# Patient Record
Sex: MALE | Race: WHITE | NOT HISPANIC OR LATINO | URBAN - METROPOLITAN AREA
[De-identification: names, ages, dates, MRNs, and addresses within clinical notes are randomized per-mention and may not be internally consistent; named-entity substitution may affect disease eponyms.]

---

## 2017-03-20 ENCOUNTER — INPATIENT (INPATIENT)
Facility: HOSPITAL | Age: 82
LOS: 15 days | Discharge: NURSING, REHAB OR LONG-TERM CARE | End: 2017-04-05
Attending: INTERNAL MEDICINE | Admitting: FAMILY MEDICINE
Payer: MEDICARE

## 2017-03-20 VITALS — WEIGHT: 216.05 LBS | HEIGHT: 64 IN

## 2017-03-20 DIAGNOSIS — Z95.9 PRESENCE OF CARDIAC AND VASCULAR IMPLANT AND GRAFT, UNSPECIFIED: Chronic | ICD-10-CM

## 2017-03-20 RX ORDER — HYDROMORPHONE HYDROCHLORIDE 2 MG/ML
0.5 INJECTION INTRAMUSCULAR; INTRAVENOUS; SUBCUTANEOUS ONCE
Qty: 0 | Refills: 0 | Status: DISCONTINUED | OUTPATIENT
Start: 2017-03-20 | End: 2017-03-20

## 2017-03-20 RX ORDER — ONDANSETRON 8 MG/1
4 TABLET, FILM COATED ORAL ONCE
Qty: 0 | Refills: 0 | Status: COMPLETED | OUTPATIENT
Start: 2017-03-20 | End: 2017-03-20

## 2017-03-20 RX ADMIN — ONDANSETRON 4 MILLIGRAM(S): 8 TABLET, FILM COATED ORAL at 23:37

## 2017-03-20 RX ADMIN — HYDROMORPHONE HYDROCHLORIDE 0.5 MILLIGRAM(S): 2 INJECTION INTRAMUSCULAR; INTRAVENOUS; SUBCUTANEOUS at 23:37

## 2017-03-20 NOTE — ED PROVIDER NOTE - PMH
Atelectasis    Benign prostatic hypertrophy    CAD (coronary artery disease)    CRI (chronic renal insufficiency)    Dyspepsia  On moderate exertion.  GERD (gastroesophageal reflux disease)    Gout    Hypercholesterolemia    Hypertension    Peripheral edema    Pleural effusion, bilateral    Respiratory failure    Sepsis, due to unspecified organism  2/2 poorly healing wounds b/l  Sleep apnea, obstructive  Requires home 02 therapy, and treatment with BIPAP  Spinal stenosis

## 2017-03-20 NOTE — ED PROVIDER NOTE - MUSCULOSKELETAL, MLM
Right patellar reflex greater than left. Left leg has erythema and oozing of the skin from the knee to the toes. Right leg has a 5cm area of oozing with about 2.5cm ulcer on the rigth lateral malleolus with black eschar. B/L pulses. Good motor strength on both lower extremities.

## 2017-03-20 NOTE — ED PROVIDER NOTE - OBJECTIVE STATEMENT
Pt c/o bilat buttock pain down to right lower leg and foot for months.  Today worse.  Pt awoke could not walkPt was advised to see pain management doctor.  Pt jerking with the pain.Weakness in right leg.  No fever.  Pt uses unna boots. Pmh HTn,copd,chf, Pt has seen Dr. Voss and Dr. Maxwell for epidurals.  Hx of spinal stenosis. Using gapapentin and lidocaine patches.Hx mi, cardiac stent.  Primary doc is Sheila Hannah cardiologist, Irma Tomas, hx gi bleed Gabbazideh. Hourizedeh.  No allergies to meds. No incontinence.Also c/o rash uppper extremeties. Ex smoker 50 pack year hisstory.No alcohol no drugs.Last mri in hospital.  hx sepsis from venous stsis ulcer this fall

## 2017-03-20 NOTE — ED PROVIDER NOTE - PROGRESS NOTE DETAILS
Pt becoming agitated and confused. Noted to have increased tachycardia, O2 pulse ox approx 90%. DuoNeb ordered. Pt refusing to use O2 mask or do neb treatments. States he does not want to have it done. Explained the risks. Pt appears confused.

## 2017-03-20 NOTE — ED PROVIDER NOTE - NS ED MD SCRIBE ATTENDING SCRIBE SECTIONS
PAST MEDICAL/SURGICAL/SOCIAL HISTORY/PHYSICAL EXAM/DISPOSITION/VITAL SIGNS( Pullset)/REVIEW OF SYSTEMS/RESULTS/PROGRESS NOTE/HISTORY OF PRESENT ILLNESS

## 2017-03-21 DIAGNOSIS — M54.9 DORSALGIA, UNSPECIFIED: ICD-10-CM

## 2017-03-21 DIAGNOSIS — I10 ESSENTIAL (PRIMARY) HYPERTENSION: ICD-10-CM

## 2017-03-21 DIAGNOSIS — I25.10 ATHEROSCLEROTIC HEART DISEASE OF NATIVE CORONARY ARTERY WITHOUT ANGINA PECTORIS: ICD-10-CM

## 2017-03-21 DIAGNOSIS — R94.31 ABNORMAL ELECTROCARDIOGRAM [ECG] [EKG]: ICD-10-CM

## 2017-03-21 DIAGNOSIS — I48.0 PAROXYSMAL ATRIAL FIBRILLATION: ICD-10-CM

## 2017-03-21 DIAGNOSIS — N17.9 ACUTE KIDNEY FAILURE, UNSPECIFIED: ICD-10-CM

## 2017-03-21 DIAGNOSIS — D50.0 IRON DEFICIENCY ANEMIA SECONDARY TO BLOOD LOSS (CHRONIC): ICD-10-CM

## 2017-03-21 DIAGNOSIS — I50.32 CHRONIC DIASTOLIC (CONGESTIVE) HEART FAILURE: ICD-10-CM

## 2017-03-21 DIAGNOSIS — R10.13 EPIGASTRIC PAIN: ICD-10-CM

## 2017-03-21 LAB
ALBUMIN SERPL ELPH-MCNC: 2.4 G/DL — LOW (ref 3.3–5)
ALBUMIN SERPL ELPH-MCNC: 2.5 G/DL — LOW (ref 3.3–5)
ALP SERPL-CCNC: 34 U/L — LOW (ref 40–120)
ALP SERPL-CCNC: 35 U/L — LOW (ref 40–120)
ALT FLD-CCNC: 11 U/L — LOW (ref 12–78)
ALT FLD-CCNC: 14 U/L — SIGNIFICANT CHANGE UP (ref 12–78)
ANION GAP SERPL CALC-SCNC: 10 MMOL/L — SIGNIFICANT CHANGE UP (ref 5–17)
ANION GAP SERPL CALC-SCNC: 12 MMOL/L — SIGNIFICANT CHANGE UP (ref 5–17)
ANISOCYTOSIS BLD QL: SLIGHT — SIGNIFICANT CHANGE UP
APTT BLD: 33.6 SEC — SIGNIFICANT CHANGE UP (ref 27.5–37.4)
APTT BLD: 35.5 SEC — SIGNIFICANT CHANGE UP (ref 27.5–37.4)
AST SERPL-CCNC: 19 U/L — SIGNIFICANT CHANGE UP (ref 15–37)
AST SERPL-CCNC: 23 U/L — SIGNIFICANT CHANGE UP (ref 15–37)
BASOPHILS # BLD AUTO: 0.1 K/UL — SIGNIFICANT CHANGE UP (ref 0–0.2)
BASOPHILS NFR BLD AUTO: 1.3 % — SIGNIFICANT CHANGE UP (ref 0–2)
BILIRUB SERPL-MCNC: 0.2 MG/DL — SIGNIFICANT CHANGE UP (ref 0.2–1.2)
BILIRUB SERPL-MCNC: 0.5 MG/DL — SIGNIFICANT CHANGE UP (ref 0.2–1.2)
BLD GP AB SCN SERPL QL: SIGNIFICANT CHANGE UP
BUN SERPL-MCNC: 108 MG/DL — HIGH (ref 7–23)
BUN SERPL-MCNC: 114 MG/DL — HIGH (ref 7–23)
CALCIUM SERPL-MCNC: 7.9 MG/DL — LOW (ref 8.5–10.1)
CALCIUM SERPL-MCNC: 8.1 MG/DL — LOW (ref 8.5–10.1)
CHLORIDE SERPL-SCNC: 112 MMOL/L — HIGH (ref 96–108)
CHLORIDE SERPL-SCNC: 114 MMOL/L — HIGH (ref 96–108)
CK SERPL-CCNC: 107 U/L — SIGNIFICANT CHANGE UP (ref 26–308)
CK SERPL-CCNC: 172 U/L — SIGNIFICANT CHANGE UP (ref 26–308)
CK SERPL-CCNC: 196 U/L — SIGNIFICANT CHANGE UP (ref 26–308)
CO2 SERPL-SCNC: 21 MMOL/L — LOW (ref 22–31)
CO2 SERPL-SCNC: 23 MMOL/L — SIGNIFICANT CHANGE UP (ref 22–31)
CREAT SERPL-MCNC: 3.23 MG/DL — HIGH (ref 0.5–1.3)
CREAT SERPL-MCNC: 3.51 MG/DL — HIGH (ref 0.5–1.3)
EOSINOPHIL # BLD AUTO: 0.2 K/UL — SIGNIFICANT CHANGE UP (ref 0–0.5)
EOSINOPHIL NFR BLD AUTO: 2.8 % — SIGNIFICANT CHANGE UP (ref 0–6)
GLUCOSE SERPL-MCNC: 124 MG/DL — HIGH (ref 70–99)
GLUCOSE SERPL-MCNC: 165 MG/DL — HIGH (ref 70–99)
HCT VFR BLD CALC: 20 % — CRITICAL LOW (ref 39–50)
HCT VFR BLD CALC: 22 % — LOW (ref 39–50)
HCT VFR BLD CALC: 22.7 % — LOW (ref 39–50)
HCT VFR BLD CALC: 23.1 % — LOW (ref 39–50)
HGB BLD-MCNC: 6.3 G/DL — CRITICAL LOW (ref 13–17)
HGB BLD-MCNC: 6.9 G/DL — CRITICAL LOW (ref 13–17)
HGB BLD-MCNC: 7.2 G/DL — LOW (ref 13–17)
HGB BLD-MCNC: 7.2 G/DL — LOW (ref 13–17)
INR BLD: 1.03 RATIO — SIGNIFICANT CHANGE UP (ref 0.88–1.16)
INR BLD: 1.08 RATIO — SIGNIFICANT CHANGE UP (ref 0.88–1.16)
LACTATE SERPL-SCNC: 0.9 MMOL/L — SIGNIFICANT CHANGE UP (ref 0.7–2)
LG PLATELETS BLD QL AUTO: SLIGHT — SIGNIFICANT CHANGE UP
LYMPHOCYTES # BLD AUTO: 0.6 K/UL — LOW (ref 1–3.3)
LYMPHOCYTES # BLD AUTO: 7 % — LOW (ref 13–44)
MACROCYTES BLD QL: SLIGHT — SIGNIFICANT CHANGE UP
MANUAL DIF COMMENT BLD-IMP: SIGNIFICANT CHANGE UP
MCHC RBC-ENTMCNC: 30.9 PG — SIGNIFICANT CHANGE UP (ref 27–34)
MCHC RBC-ENTMCNC: 31.2 GM/DL — LOW (ref 32–36)
MCHC RBC-ENTMCNC: 31.4 GM/DL — LOW (ref 32–36)
MCHC RBC-ENTMCNC: 31.5 PG — SIGNIFICANT CHANGE UP (ref 27–34)
MCV RBC AUTO: 100.5 FL — HIGH (ref 80–100)
MCV RBC AUTO: 99 FL — SIGNIFICANT CHANGE UP (ref 80–100)
MONOCYTES # BLD AUTO: 0.6 K/UL — SIGNIFICANT CHANGE UP (ref 0–0.9)
MONOCYTES NFR BLD AUTO: 6.5 % — SIGNIFICANT CHANGE UP (ref 2–14)
NEUTROPHILS # BLD AUTO: 7.1 K/UL — SIGNIFICANT CHANGE UP (ref 1.8–7.4)
NEUTROPHILS NFR BLD AUTO: 82.4 % — HIGH (ref 43–77)
NT-PROBNP SERPL-SCNC: 1025 PG/ML — HIGH (ref 0–450)
OVALOCYTES BLD QL SMEAR: SIGNIFICANT CHANGE UP
PLAT MORPH BLD: (no result)
PLATELET # BLD AUTO: 317 K/UL — SIGNIFICANT CHANGE UP (ref 150–400)
PLATELET # BLD AUTO: 337 K/UL — SIGNIFICANT CHANGE UP (ref 150–400)
POIKILOCYTOSIS BLD QL AUTO: SIGNIFICANT CHANGE UP
POLYCHROMASIA BLD QL SMEAR: SLIGHT — SIGNIFICANT CHANGE UP
POTASSIUM SERPL-MCNC: 4.2 MMOL/L — SIGNIFICANT CHANGE UP (ref 3.5–5.3)
POTASSIUM SERPL-MCNC: 4.4 MMOL/L — SIGNIFICANT CHANGE UP (ref 3.5–5.3)
POTASSIUM SERPL-SCNC: 4.2 MMOL/L — SIGNIFICANT CHANGE UP (ref 3.5–5.3)
POTASSIUM SERPL-SCNC: 4.4 MMOL/L — SIGNIFICANT CHANGE UP (ref 3.5–5.3)
PROT SERPL-MCNC: 5.7 GM/DL — LOW (ref 6–8.3)
PROT SERPL-MCNC: 5.7 GM/DL — LOW (ref 6–8.3)
PROTHROM AB SERPL-ACNC: 11.3 SEC — SIGNIFICANT CHANGE UP (ref 10–13.1)
PROTHROM AB SERPL-ACNC: 11.9 SEC — SIGNIFICANT CHANGE UP (ref 10–13.1)
RBC # BLD: 1.98 M/UL — LOW (ref 4.2–5.8)
RBC # BLD: 2.22 M/UL — LOW (ref 4.2–5.8)
RBC # FLD: 19.5 % — HIGH (ref 10.3–14.5)
RBC # FLD: 20.1 % — HIGH (ref 10.3–14.5)
RBC BLD AUTO: (no result)
SCHISTOCYTES BLD QL AUTO: SLIGHT — SIGNIFICANT CHANGE UP
SODIUM SERPL-SCNC: 145 MMOL/L — SIGNIFICANT CHANGE UP (ref 135–145)
SODIUM SERPL-SCNC: 147 MMOL/L — HIGH (ref 135–145)
TROPONIN I SERPL-MCNC: 0.02 NG/ML — SIGNIFICANT CHANGE UP (ref 0.01–0.04)
TROPONIN I SERPL-MCNC: 0.02 NG/ML — SIGNIFICANT CHANGE UP (ref 0.01–0.04)
TROPONIN I SERPL-MCNC: 0.03 NG/ML — SIGNIFICANT CHANGE UP (ref 0.01–0.04)
TYPE + AB SCN PNL BLD: SIGNIFICANT CHANGE UP
WBC # BLD: 8.6 K/UL — SIGNIFICANT CHANGE UP (ref 3.8–10.5)
WBC # BLD: 9.7 K/UL — SIGNIFICANT CHANGE UP (ref 3.8–10.5)
WBC # FLD AUTO: 8.6 K/UL — SIGNIFICANT CHANGE UP (ref 3.8–10.5)
WBC # FLD AUTO: 9.7 K/UL — SIGNIFICANT CHANGE UP (ref 3.8–10.5)

## 2017-03-21 PROCEDURE — 99285 EMERGENCY DEPT VISIT HI MDM: CPT

## 2017-03-21 PROCEDURE — 71010: CPT | Mod: 26

## 2017-03-21 PROCEDURE — 72148 MRI LUMBAR SPINE W/O DYE: CPT | Mod: 26

## 2017-03-21 PROCEDURE — 93010 ELECTROCARDIOGRAM REPORT: CPT

## 2017-03-21 PROCEDURE — 72100 X-RAY EXAM L-S SPINE 2/3 VWS: CPT | Mod: 26

## 2017-03-21 RX ORDER — PANTOPRAZOLE SODIUM 20 MG/1
40 TABLET, DELAYED RELEASE ORAL
Qty: 0 | Refills: 0 | Status: DISCONTINUED | OUTPATIENT
Start: 2017-03-21 | End: 2017-03-21

## 2017-03-21 RX ORDER — GABAPENTIN 400 MG/1
300 CAPSULE ORAL AT BEDTIME
Qty: 0 | Refills: 0 | Status: DISCONTINUED | OUTPATIENT
Start: 2017-03-21 | End: 2017-03-22

## 2017-03-21 RX ORDER — GABAPENTIN 400 MG/1
100 CAPSULE ORAL DAILY
Qty: 0 | Refills: 0 | Status: DISCONTINUED | OUTPATIENT
Start: 2017-03-21 | End: 2017-03-22

## 2017-03-21 RX ORDER — FINASTERIDE 5 MG/1
5 TABLET, FILM COATED ORAL DAILY
Qty: 0 | Refills: 0 | Status: DISCONTINUED | OUTPATIENT
Start: 2017-03-21 | End: 2017-04-05

## 2017-03-21 RX ORDER — PANTOPRAZOLE SODIUM 20 MG/1
8 TABLET, DELAYED RELEASE ORAL
Qty: 80 | Refills: 0 | Status: DISCONTINUED | OUTPATIENT
Start: 2017-03-21 | End: 2017-03-21

## 2017-03-21 RX ORDER — DOXAZOSIN MESYLATE 4 MG
8 TABLET ORAL AT BEDTIME
Qty: 0 | Refills: 0 | Status: DISCONTINUED | OUTPATIENT
Start: 2017-03-21 | End: 2017-04-05

## 2017-03-21 RX ORDER — HEPARIN SODIUM 5000 [USP'U]/ML
5000 INJECTION INTRAVENOUS; SUBCUTANEOUS EVERY 8 HOURS
Qty: 0 | Refills: 0 | Status: DISCONTINUED | OUTPATIENT
Start: 2017-03-21 | End: 2017-03-22

## 2017-03-21 RX ORDER — SODIUM CHLORIDE 9 MG/ML
1000 INJECTION, SOLUTION INTRAVENOUS
Qty: 0 | Refills: 0 | Status: DISCONTINUED | OUTPATIENT
Start: 2017-03-21 | End: 2017-03-21

## 2017-03-21 RX ORDER — ISOSORBIDE MONONITRATE 60 MG/1
120 TABLET, EXTENDED RELEASE ORAL DAILY
Qty: 0 | Refills: 0 | Status: DISCONTINUED | OUTPATIENT
Start: 2017-03-21 | End: 2017-04-05

## 2017-03-21 RX ORDER — PRAMIPEXOLE DIHYDROCHLORIDE 0.12 MG/1
0.12 TABLET ORAL THREE TIMES A DAY
Qty: 0 | Refills: 0 | Status: DISCONTINUED | OUTPATIENT
Start: 2017-03-21 | End: 2017-04-05

## 2017-03-21 RX ORDER — SOD,AMMONIUM,POTASSIUM LACTATE
1 CREAM (GRAM) TOPICAL
Qty: 0 | Refills: 0 | Status: DISCONTINUED | OUTPATIENT
Start: 2017-03-21 | End: 2017-04-05

## 2017-03-21 RX ORDER — NITROGLYCERIN 6.5 MG
0.4 CAPSULE, EXTENDED RELEASE ORAL
Qty: 0 | Refills: 0 | Status: DISCONTINUED | OUTPATIENT
Start: 2017-03-21 | End: 2017-04-05

## 2017-03-21 RX ORDER — FAMOTIDINE 10 MG/ML
20 INJECTION INTRAVENOUS AT BEDTIME
Qty: 0 | Refills: 0 | Status: DISCONTINUED | OUTPATIENT
Start: 2017-03-21 | End: 2017-03-22

## 2017-03-21 RX ORDER — ALLOPURINOL 300 MG
100 TABLET ORAL DAILY
Qty: 0 | Refills: 0 | Status: DISCONTINUED | OUTPATIENT
Start: 2017-03-21 | End: 2017-04-05

## 2017-03-21 RX ORDER — DILTIAZEM HCL 120 MG
240 CAPSULE, EXT RELEASE 24 HR ORAL DAILY
Qty: 0 | Refills: 0 | Status: DISCONTINUED | OUTPATIENT
Start: 2017-03-21 | End: 2017-04-05

## 2017-03-21 RX ORDER — ACETAMINOPHEN 500 MG
650 TABLET ORAL EVERY 6 HOURS
Qty: 0 | Refills: 0 | Status: DISCONTINUED | OUTPATIENT
Start: 2017-03-21 | End: 2017-03-22

## 2017-03-21 RX ORDER — PANTOPRAZOLE SODIUM 20 MG/1
40 TABLET, DELAYED RELEASE ORAL EVERY 12 HOURS
Qty: 0 | Refills: 0 | Status: DISCONTINUED | OUTPATIENT
Start: 2017-03-21 | End: 2017-03-22

## 2017-03-21 RX ORDER — GABAPENTIN 400 MG/1
300 CAPSULE ORAL DAILY
Qty: 0 | Refills: 0 | Status: DISCONTINUED | OUTPATIENT
Start: 2017-03-21 | End: 2017-03-21

## 2017-03-21 RX ORDER — PANTOPRAZOLE SODIUM 20 MG/1
80 TABLET, DELAYED RELEASE ORAL ONCE
Qty: 0 | Refills: 0 | Status: COMPLETED | OUTPATIENT
Start: 2017-03-21 | End: 2017-03-21

## 2017-03-21 RX ORDER — METOPROLOL TARTRATE 50 MG
12.5 TABLET ORAL
Qty: 0 | Refills: 0 | Status: DISCONTINUED | OUTPATIENT
Start: 2017-03-21 | End: 2017-04-05

## 2017-03-21 RX ORDER — GABAPENTIN 400 MG/1
600 CAPSULE ORAL AT BEDTIME
Qty: 0 | Refills: 0 | Status: DISCONTINUED | OUTPATIENT
Start: 2017-03-21 | End: 2017-04-05

## 2017-03-21 RX ORDER — HYDROMORPHONE HYDROCHLORIDE 2 MG/ML
0.5 INJECTION INTRAMUSCULAR; INTRAVENOUS; SUBCUTANEOUS ONCE
Qty: 0 | Refills: 0 | Status: DISCONTINUED | OUTPATIENT
Start: 2017-03-21 | End: 2017-03-21

## 2017-03-21 RX ORDER — FENOFIBRATE,MICRONIZED 130 MG
145 CAPSULE ORAL DAILY
Qty: 0 | Refills: 0 | Status: DISCONTINUED | OUTPATIENT
Start: 2017-03-21 | End: 2017-04-05

## 2017-03-21 RX ORDER — FERROUS SULFATE 325(65) MG
325 TABLET ORAL
Qty: 0 | Refills: 0 | Status: DISCONTINUED | OUTPATIENT
Start: 2017-03-21 | End: 2017-03-23

## 2017-03-21 RX ORDER — LORATADINE 10 MG/1
10 TABLET ORAL DAILY
Qty: 0 | Refills: 0 | Status: DISCONTINUED | OUTPATIENT
Start: 2017-03-21 | End: 2017-04-05

## 2017-03-21 RX ORDER — ASPIRIN/CALCIUM CARB/MAGNESIUM 324 MG
81 TABLET ORAL DAILY
Qty: 0 | Refills: 0 | Status: DISCONTINUED | OUTPATIENT
Start: 2017-03-21 | End: 2017-03-21

## 2017-03-21 RX ORDER — GLYCERIN ADULT
1 SUPPOSITORY, RECTAL RECTAL DAILY
Qty: 0 | Refills: 0 | Status: DISCONTINUED | OUTPATIENT
Start: 2017-03-21 | End: 2017-04-05

## 2017-03-21 RX ORDER — HYDRALAZINE HCL 50 MG
150 TABLET ORAL
Qty: 0 | Refills: 0 | Status: DISCONTINUED | OUTPATIENT
Start: 2017-03-21 | End: 2017-04-05

## 2017-03-21 RX ORDER — SENNA PLUS 8.6 MG/1
2 TABLET ORAL AT BEDTIME
Qty: 0 | Refills: 0 | Status: DISCONTINUED | OUTPATIENT
Start: 2017-03-21 | End: 2017-04-05

## 2017-03-21 RX ORDER — SIMVASTATIN 20 MG/1
10 TABLET, FILM COATED ORAL AT BEDTIME
Qty: 0 | Refills: 0 | Status: DISCONTINUED | OUTPATIENT
Start: 2017-03-21 | End: 2017-04-05

## 2017-03-21 RX ORDER — BUDESONIDE, MICRONIZED 100 %
0.5 POWDER (GRAM) MISCELLANEOUS
Qty: 0 | Refills: 0 | Status: DISCONTINUED | OUTPATIENT
Start: 2017-03-21 | End: 2017-04-05

## 2017-03-21 RX ADMIN — FAMOTIDINE 20 MILLIGRAM(S): 10 INJECTION INTRAVENOUS at 23:02

## 2017-03-21 RX ADMIN — Medication 12.5 MILLIGRAM(S): at 19:18

## 2017-03-21 RX ADMIN — HYDROMORPHONE HYDROCHLORIDE 0.5 MILLIGRAM(S): 2 INJECTION INTRAMUSCULAR; INTRAVENOUS; SUBCUTANEOUS at 00:43

## 2017-03-21 RX ADMIN — Medication 150 MILLIGRAM(S): at 19:17

## 2017-03-21 RX ADMIN — Medication 240 MILLIGRAM(S): at 19:26

## 2017-03-21 RX ADMIN — FINASTERIDE 5 MILLIGRAM(S): 5 TABLET, FILM COATED ORAL at 19:04

## 2017-03-21 RX ADMIN — Medication 8 MILLIGRAM(S): at 22:58

## 2017-03-21 RX ADMIN — Medication 0.5 MILLIGRAM(S): at 20:05

## 2017-03-21 RX ADMIN — SENNA PLUS 2 TABLET(S): 8.6 TABLET ORAL at 23:03

## 2017-03-21 RX ADMIN — GABAPENTIN 600 MILLIGRAM(S): 400 CAPSULE ORAL at 22:59

## 2017-03-21 RX ADMIN — Medication 325 MILLIGRAM(S): at 19:06

## 2017-03-21 RX ADMIN — HEPARIN SODIUM 5000 UNIT(S): 5000 INJECTION INTRAVENOUS; SUBCUTANEOUS at 23:01

## 2017-03-21 RX ADMIN — Medication 650 MILLIGRAM(S): at 22:09

## 2017-03-21 RX ADMIN — SIMVASTATIN 10 MILLIGRAM(S): 20 TABLET, FILM COATED ORAL at 22:58

## 2017-03-21 RX ADMIN — PANTOPRAZOLE SODIUM 40 MILLIGRAM(S): 20 TABLET, DELAYED RELEASE ORAL at 19:19

## 2017-03-21 RX ADMIN — HYDROMORPHONE HYDROCHLORIDE 0.5 MILLIGRAM(S): 2 INJECTION INTRAMUSCULAR; INTRAVENOUS; SUBCUTANEOUS at 01:49

## 2017-03-21 RX ADMIN — GABAPENTIN 100 MILLIGRAM(S): 400 CAPSULE ORAL at 19:00

## 2017-03-21 RX ADMIN — Medication 145 MILLIGRAM(S): at 19:02

## 2017-03-21 RX ADMIN — Medication 100 MILLIGRAM(S): at 19:02

## 2017-03-21 RX ADMIN — PANTOPRAZOLE SODIUM 80 MILLIGRAM(S): 20 TABLET, DELAYED RELEASE ORAL at 04:30

## 2017-03-21 RX ADMIN — PANTOPRAZOLE SODIUM 10 MG/HR: 20 TABLET, DELAYED RELEASE ORAL at 04:30

## 2017-03-21 RX ADMIN — PANTOPRAZOLE SODIUM 10 MG/HR: 20 TABLET, DELAYED RELEASE ORAL at 05:27

## 2017-03-21 RX ADMIN — PRAMIPEXOLE DIHYDROCHLORIDE 0.12 MILLIGRAM(S): 0.12 TABLET ORAL at 23:02

## 2017-03-21 RX ADMIN — HYDROMORPHONE HYDROCHLORIDE 0.5 MILLIGRAM(S): 2 INJECTION INTRAMUSCULAR; INTRAVENOUS; SUBCUTANEOUS at 01:30

## 2017-03-21 RX ADMIN — Medication 1 APPLICATION(S): at 23:03

## 2017-03-21 NOTE — PROGRESS NOTE ADULT - ASSESSMENT
82 year old male w/h/o CAD s/p multiple PCI, AF not on oral anticoagulation due to severe GIB who presents with anemia likely secondary to recurrent GIB..  Will evaluate for Iron defiviency sheri be candidate for IV Iron, procrit..  Check for secondary hyperpara,   ARYA, CKD  most likely hemodynamic changes and will come down to baseline..  will order glycerine suppository, and senakot bid.. NO phoshate or magnesium containing preps due to electrolyte load..  Reduce gabapentin to 100 in am 300 pm ( from 300 and 600), most likely cause of the involuntary mm jerks  d/w Dr Garcia..

## 2017-03-21 NOTE — PROGRESS NOTE ADULT - SUBJECTIVE AND OBJECTIVE BOX
CHIEF COMPLAINT:    SUBJECTIVE:     REVIEW OF SYSTEMS:    CONSTITUTIONAL: No weakness, fevers or chills  EYES/ENT: No visual changes;  No vertigo or throat pain   NECK: No pain or stiffness  RESPIRATORY: No cough, wheezing, hemoptysis; No shortness of breath  CARDIOVASCULAR: No chest pain or palpitations  GASTROINTESTINAL: No abdominal or epigastric pain. No nausea, vomiting, or hematemesis; No diarrhea or constipation. No melena or hematochezia.  GENITOURINARY: No dysuria, frequency or hematuria  NEUROLOGICAL: No numbness or weakness  SKIN: No itching, burning, rashes, or lesions   All other review of systems is negative unless indicated above    Vital Signs Last 24 Hrs  T(C): 36.6, Max: 36.7 (03-20 @ 22:39)  T(F): 97.9, Max: 98.1 (03-20 @ 22:39)  HR: 95 (90 - 112)  BP: 121/53 (121/53 - 140/63)  BP(mean): --  RR: 18 (16 - 18)  SpO2: 93% (93% - 100%)    I&O's Summary      CAPILLARY BLOOD GLUCOSE      PHYSICAL EXAM:    Constitutional: NAD, awake and alert, well-developed  HEENT: PERR, EOMI, Normal Hearing, MMM  Neck: Soft and supple, No LAD, No JVD  Respiratory: Breath sounds are clear bilaterally, No wheezing, rales or rhonchi  Cardiovascular: S1 and S2, regular rate and rhythm, no Murmurs, gallops or rubs  Gastrointestinal: Bowel Sounds present, soft, nontender, nondistended, no guarding, no rebound  Extremities: No peripheral edema  Vascular: 2+ peripheral pulses  Neurological: A/O x 3, no focal deficits  Musculoskeletal: 5/5 strength b/l upper and lower extremities  Skin: No rashes    MEDICATIONS:  MEDICATIONS  (STANDING):  pantoprazole Infusion 8mG/Hr IV Continuous <Continuous>  finasteride 5milliGRAM(s) Oral daily  buDESOnide   0.5 milliGRAM(s) Respule 0.5milliGRAM(s) Nebulizer two times a day  aspirin enteric coated 81milliGRAM(s) Oral daily  doxazosin 8milliGRAM(s) Oral at bedtime  isosorbide   mononitrate ER Tablet (IMDUR) 120milliGRAM(s) Oral daily  diltiazem    Tablet 120milliGRAM(s) Oral two times a day  gabapentin Oral Tab/Cap - Peds 600milliGRAM(s) Oral at bedtime  gabapentin 300milliGRAM(s) Oral daily  allopurinol 100milliGRAM(s) Oral daily  loratadine 10milliGRAM(s) Oral daily  fenofibrate Tablet 145milliGRAM(s) Oral daily  simvastatin 10milliGRAM(s) Oral at bedtime  pramipexole 0.125milliGRAM(s) Oral three times a day  metoprolol Oral Tab/Cap - Peds 12.5milliGRAM(s) Oral two times a day  famotidine    Tablet 20milliGRAM(s) Oral at bedtime  ferrous    sulfate 325milliGRAM(s) Oral three times a day with meals  hydrALAZINE 150milliGRAM(s) Oral two times a day  pantoprazole    Tablet 40milliGRAM(s) Oral two times a day before meals  ammonium lactate 12% Lotion 1Application(s) Topical two times a day  heparin  Injectable 5000Unit(s) SubCutaneous every 8 hours      LABS: All Labs Reviewed:                        6.3    8.6   )-----------( 337      ( 20 Mar 2017 22:39 )             20.0     20 Mar 2017 22:39    147    |  114    |  114    ----------------------------<  124    4.4     |  23     |  3.51     Ca    8.1        20 Mar 2017 22:39    TPro  5.7    /  Alb  2.5    /  TBili  0.2    /  DBili  x      /  AST  19     /  ALT  14     /  AlkPhos  34     20 Mar 2017 22:39    PT/INR - ( 20 Mar 2017 22:39 )   PT: 11.3 sec;   INR: 1.03 ratio         PTT - ( 20 Mar 2017 22:39 )  PTT:33.6 sec  CARDIAC MARKERS ( 21 Mar 2017 03:42 )  0.018 ng/mL / x     / 107 U/L / x     / x          Blood Culture:     RADIOLOGY/EKG:    DVT PPX:    ADVANCED DIRECTIVE:    DISPOSITION:          Problem/Plan - 1:  ·  Problem: Anemia due to blood loss.  Plan: Anemia secondary to slow upper GI bleed with cookie   Admit to tele until anemia improves after transfusion of PRBC  protonix Iv drip started in ED  GI consult  CBC q6 hrs   prn transfusion for Hb <8  plavix was stopped by his PMD feb 2017  would continue asa 81 for now due to hx of reccent cardiac stent in sept 2017  cardio consult for antiplatelet management  DVT prophylaxis- patient high risk for VTE ,unable to tolerate IPC ,would start heparin SC and monitor anemia.     Problem/Plan - 2:  ·  Problem: Acute back pain, unspecified back location, unspecified back pain laterality.  Plan: Acute on chronic lumbar radiculopathy to right side  vs r/o vascular claudication (c/o leg and thigh pain with hx of PVD)/no cellulitis to B/L extremities   pain management  vascular surgery consult  ortho spine consult  wound care for venous stasis ulcers.     Problem/Plan - 3:  ·  Problem: Acute kidney injury superimposed on CKD.  Plan: ARYA likely secondary to volume depletion due to slow GI blood loss   transfuse PRBC as ordered  monitor BMP  nephrology consult.     Problem/Plan - 4:  ·  Problem: Paroxysmal atrial fibrillation.  Plan: currently in sinus rhythm on EKG   not on AC due to hx of GI bleed.     Problem/Plan - 5:  ·  Problem: Chronic diastolic congestive heart failure.  Plan: chronic diastolic heart failure ,no clinical decompensation   continue meds as per reconcilliation. CHIEF COMPLAINT:    SUBJECTIVE:     REVIEW OF SYSTEMS:    CONSTITUTIONAL: No weakness, fevers or chills  EYES/ENT: No visual changes;  No vertigo or throat pain   NECK: No pain or stiffness  RESPIRATORY: No cough, wheezing, hemoptysis; No shortness of breath  CARDIOVASCULAR: No chest pain or palpitations  GASTROINTESTINAL: No abdominal or epigastric pain. No nausea, vomiting, or hematemesis; No diarrhea or constipation. No melena or hematochezia.  GENITOURINARY: No dysuria, frequency or hematuria  NEUROLOGICAL: No numbness or weakness  SKIN: No itching, burning, rashes, or lesions   All other review of systems is negative unless indicated above    Vital Signs Last 24 Hrs  T(C): 36.6, Max: 36.7 (03-20 @ 22:39)  T(F): 97.9, Max: 98.1 (03-20 @ 22:39)  HR: 95 (90 - 112)  BP: 121/53 (121/53 - 140/63)  BP(mean): --  RR: 18 (16 - 18)  SpO2: 93% (93% - 100%)    I&O's Summary      CAPILLARY BLOOD GLUCOSE      PHYSICAL EXAM:    Constitutional: NAD, awake and alert, well-developed  HEENT: PERR, EOMI, Normal Hearing, MMM  Neck: Soft and supple, No LAD, No JVD  Respiratory: Breath sounds are clear bilaterally, No wheezing, rales or rhonchi  Cardiovascular: S1 and S2, regular rate and rhythm, no Murmurs, gallops or rubs  Gastrointestinal: Bowel Sounds present, soft, nontender, nondistended, no guarding, no rebound  Extremities: No peripheral edema  Vascular: 2+ peripheral pulses  Neurological: A/O x 3, no focal deficits  Musculoskeletal: 5/5 strength b/l upper and lower extremities  Skin: No rashes    MEDICATIONS:  MEDICATIONS  (STANDING):  pantoprazole Infusion 8mG/Hr IV Continuous <Continuous>  finasteride 5milliGRAM(s) Oral daily  buDESOnide   0.5 milliGRAM(s) Respule 0.5milliGRAM(s) Nebulizer two times a day  aspirin enteric coated 81milliGRAM(s) Oral daily  doxazosin 8milliGRAM(s) Oral at bedtime  isosorbide   mononitrate ER Tablet (IMDUR) 120milliGRAM(s) Oral daily  diltiazem    Tablet 120milliGRAM(s) Oral two times a day  gabapentin Oral Tab/Cap - Peds 600milliGRAM(s) Oral at bedtime  gabapentin 300milliGRAM(s) Oral daily  allopurinol 100milliGRAM(s) Oral daily  loratadine 10milliGRAM(s) Oral daily  fenofibrate Tablet 145milliGRAM(s) Oral daily  simvastatin 10milliGRAM(s) Oral at bedtime  pramipexole 0.125milliGRAM(s) Oral three times a day  metoprolol Oral Tab/Cap - Peds 12.5milliGRAM(s) Oral two times a day  famotidine    Tablet 20milliGRAM(s) Oral at bedtime  ferrous    sulfate 325milliGRAM(s) Oral three times a day with meals  hydrALAZINE 150milliGRAM(s) Oral two times a day  pantoprazole    Tablet 40milliGRAM(s) Oral two times a day before meals  ammonium lactate 12% Lotion 1Application(s) Topical two times a day  heparin  Injectable 5000Unit(s) SubCutaneous every 8 hours      LABS: All Labs Reviewed:                        6.3    8.6   )-----------( 337      ( 20 Mar 2017 22:39 )             20.0     20 Mar 2017 22:39    147    |  114    |  114    ----------------------------<  124    4.4     |  23     |  3.51     Ca    8.1        20 Mar 2017 22:39    TPro  5.7    /  Alb  2.5    /  TBili  0.2    /  DBili  x      /  AST  19     /  ALT  14     /  AlkPhos  34     20 Mar 2017 22:39    PT/INR - ( 20 Mar 2017 22:39 )   PT: 11.3 sec;   INR: 1.03 ratio         PTT - ( 20 Mar 2017 22:39 )  PTT:33.6 sec  CARDIAC MARKERS ( 21 Mar 2017 03:42 )  0.018 ng/mL / x     / 107 U/L / x     / x          Blood Culture:     RADIOLOGY/EKG:    DVT PPX:    ADVANCED DIRECTIVE:    DISPOSITION:    HPI: :: 82 y/o  PMHx: ::  HTN,  COPD on home O2 2L,  SHAYY on nocturnal BIPAP,  Chronic diastolic CHF,  CAD s/p PCI with stent in 2002,  Last cath in july 2014 with RCA disease medically managed, Hyperlipidemia,  PVD,  Iron deficiency anemia,  Chronic back pain,  Gout,  BPH,  CKD III with baseline Cr 2.2,  PAF not on a/c,  Hx of GI Bleed in the past, hemorrhoids s/p banding,  PSHx: ::  Right rotator cuff repair  pilonidal cyst  Family History: ::  Father: rectal CA  Mother: HTN, DM  3 siblings: DM  Social History: ::  Tobacco: smoked 1ppd X 50 years, quit 22 years ago.  Rare ETOH use.  wife recently passed away last year       male with PMHx of COPD, CHF, chronic renal failure, paroxysmal A-fib.with last hospitaliztion 10/2017 for hematochezia s/p bleeding scan and flex sigmoidoscopy  was BIBA for acute worsening lower back pain radiating to buttock to foot   patient with amber boots for chronic venous stasis ulcers and lower extremity chronic edema ,daughter reports increased oozing from ulcers in lai past few weeks,no fever  In Ed patient noted to have Hb 6 and stool guaiac positive  and rectal exam- with cookie  done by ED physician as per note   Patient denies any chest pain or worsening of SOB ,no abdominal pain or vomiting ,no diarrhea,no efver or chills        Problem/Plan - 1:  ·  Problem: Anemia due to blood loss.  Plan: Anemia secondary to slow upper GI bleed with cookie   Admit to tele until anemia improves after transfusion of PRBC  protonix Iv drip started in ED  GI consult  CBC q6 hrs   prn transfusion for Hb <8  plavix was stopped by his PMD feb 2017  would continue asa 81 for now due to hx of reccent cardiac stent in sept 2017  cardio consult for antiplatelet management  DVT prophylaxis- patient high risk for VTE ,unable to tolerate IPC ,would start heparin SC and monitor anemia.     Problem/Plan - 2:  ·  Problem: Acute back pain, unspecified back location, unspecified back pain laterality.  Plan: Acute on chronic lumbar radiculopathy to right side  vs r/o vascular claudication (c/o leg and thigh pain with hx of PVD)/no cellulitis to B/L extremities   pain management  vascular surgery consult  ortho spine consult  wound care for venous stasis ulcers.     Problem/Plan - 3:  ·  Problem: Acute kidney injury superimposed on CKD.  Plan: ARYA likely secondary to volume depletion due to slow GI blood loss   transfuse PRBC as ordered  monitor BMP  nephrology consult.     Problem/Plan - 4:  ·  Problem: Paroxysmal atrial fibrillation.  Plan: currently in sinus rhythm on EKG   not on AC due to hx of GI bleed.     Problem/Plan - 5:  ·  Problem: Chronic diastolic congestive heart failure.  Plan: chronic diastolic heart failure ,no clinical decompensation   continue meds as per reconcilliation. CHIEF COMPLAINT:    SUBJECTIVE:     REVIEW OF SYSTEMS:    CONSTITUTIONAL: No weakness, fevers or chills  EYES/ENT: No visual changes;  No vertigo or throat pain   NECK: No pain or stiffness  RESPIRATORY: No cough, wheezing, hemoptysis; No shortness of breath  CARDIOVASCULAR: No chest pain or palpitations  GASTROINTESTINAL: No abdominal or epigastric pain. No nausea, vomiting, or hematemesis; No diarrhea or constipation. No melena or hematochezia.  GENITOURINARY: No dysuria, frequency or hematuria  NEUROLOGICAL: No numbness or weakness  SKIN: No itching, burning, rashes, or lesions   All other review of systems is negative unless indicated above    Vital Signs Last 24 Hrs  T(C): 36.6, Max: 36.7 (03-20 @ 22:39)  T(F): 97.9, Max: 98.1 (03-20 @ 22:39)  HR: 95 (90 - 112)  BP: 121/53 (121/53 - 140/63)  BP(mean): --  RR: 18 (16 - 18)  SpO2: 93% (93% - 100%)    I&O's Summary      CAPILLARY BLOOD GLUCOSE      PHYSICAL EXAM:    Constitutional: NAD, awake and alert, well-developed  HEENT: PERR, EOMI, Normal Hearing, MMM  Neck: Soft and supple, No LAD, No JVD  Respiratory: Breath sounds are clear bilaterally, No wheezing, rales or rhonchi  Cardiovascular: S1 and S2, regular rate and rhythm, no Murmurs, gallops or rubs  Gastrointestinal: Bowel Sounds present, soft, nontender, nondistended, no guarding, no rebound  Extremities: No peripheral edema  Vascular: 2+ peripheral pulses  Neurological: A/O x 3, no focal deficits  Musculoskeletal: 5/5 strength b/l upper and lower extremities  Skin: No rashes    MEDICATIONS:  MEDICATIONS  (STANDING):  pantoprazole Infusion 8mG/Hr IV Continuous <Continuous>  finasteride 5milliGRAM(s) Oral daily  buDESOnide   0.5 milliGRAM(s) Respule 0.5milliGRAM(s) Nebulizer two times a day  aspirin enteric coated 81milliGRAM(s) Oral daily  doxazosin 8milliGRAM(s) Oral at bedtime  isosorbide   mononitrate ER Tablet (IMDUR) 120milliGRAM(s) Oral daily  diltiazem    Tablet 120milliGRAM(s) Oral two times a day  gabapentin Oral Tab/Cap - Peds 600milliGRAM(s) Oral at bedtime  gabapentin 300milliGRAM(s) Oral daily  allopurinol 100milliGRAM(s) Oral daily  loratadine 10milliGRAM(s) Oral daily  fenofibrate Tablet 145milliGRAM(s) Oral daily  simvastatin 10milliGRAM(s) Oral at bedtime  pramipexole 0.125milliGRAM(s) Oral three times a day  metoprolol Oral Tab/Cap - Peds 12.5milliGRAM(s) Oral two times a day  famotidine    Tablet 20milliGRAM(s) Oral at bedtime  ferrous    sulfate 325milliGRAM(s) Oral three times a day with meals  hydrALAZINE 150milliGRAM(s) Oral two times a day  pantoprazole    Tablet 40milliGRAM(s) Oral two times a day before meals  ammonium lactate 12% Lotion 1Application(s) Topical two times a day  heparin  Injectable 5000Unit(s) SubCutaneous every 8 hours      LABS: All Labs Reviewed:                        6.3    8.6   )-----------( 337      ( 20 Mar 2017 22:39 )             20.0     20 Mar 2017 22:39    147    |  114    |  114    ----------------------------<  124    4.4     |  23     |  3.51     Ca    8.1        20 Mar 2017 22:39    TPro  5.7    /  Alb  2.5    /  TBili  0.2    /  DBili  x      /  AST  19     /  ALT  14     /  AlkPhos  34     20 Mar 2017 22:39    PT/INR - ( 20 Mar 2017 22:39 )   PT: 11.3 sec;   INR: 1.03 ratio         PTT - ( 20 Mar 2017 22:39 )  PTT:33.6 sec  CARDIAC MARKERS ( 21 Mar 2017 03:42 )  0.018 ng/mL / x     / 107 U/L / x     / x          Blood Culture:     RADIOLOGY/EKG:    DVT PPX:    ADVANCED DIRECTIVE:    DISPOSITION:    HPI: :: 80 y/o  PMHx: ::  HTN,  COPD on home O2 2L,  SHAYY on nocturnal BIPAP,  Chronic diastolic CHF,  CAD s/p PCI with stent in 2002,  Last cath in july 2014 with RCA disease medically managed, Hyperlipidemia,  PVD,  Iron deficiency anemia,  Chronic back pain,  Gout,  BPH,  CKD III with baseline Cr 2.2,  PAF not on a/c,  Hx of GI Bleed in the past, hemorrhoids s/p banding,  PSHx: ::  Right rotator cuff repair  pilonidal cyst  Family History: ::  Father: rectal CA  Mother: HTN, DM  3 siblings: DM  Social History: ::  Tobacco: smoked 1ppd X 50 years, quit 22 years ago.  Rare ETOH use.  wife recently passed away last year       male with PMHx of COPD, CHF, chronic renal failure, paroxysmal A-fib.with last hospitaliztion 10/2017 for hematochezia s/p bleeding scan and flex sigmoidoscopy  was BIBA for acute worsening lower back pain radiating to buttock to foot   patient with amber boots for chronic venous stasis ulcers and lower extremity chronic edema ,daughter reports increased oozing from ulcers in lai past few weeks,no fever  In Ed patient noted to have Hb 6 and stool guaiac positive  and rectal exam- with cookie  done by ED physician as per note   Patient denies any chest pain or worsening of SOB ,no abdominal pain or vomiting ,no diarrhea,no efver or chills      Assessment:  80 y/o male with PMHx of COPD, CHF, chronic renal failure, paroxysmal A-fib.with last hospitaliztion 10/2017 for hematochezia s/p bleeding scan and flex sigmoidoscopy  was BIBA for acute worsening lower back pain radiating to buttock to foot   patient with amber boots for chronic venous stasis ulcers and lower extremity chronic edema ,daughter reports increased oozing from ulcers in lai past few weeks,no fever  In Ed patient noted to have Hb 6 and stool guaiac positive  and rectal exam- with cookie  done by ED physician as per note   Patient denies any chest pain or worsening of SOB ,no abdominal pain or vomiting ,no diarrhea,no efver or chills      Problem/Plan - 1:  ·  Problem: Anemia due to blood loss.  Plan: Anemia secondary to slow upper GI bleed with cookie   Admit to tele until anemia improves after transfusion of PRBC  protonix Iv drip started in ED  GI consult  CBC q6 hrs   prn transfusion for Hb <8  plavix was stopped by his PMD feb 2017  would continue asa 81 for now due to hx of reccent cardiac stent in sept 2017  cardio consult for antiplatelet management  DVT prophylaxis- patient high risk for VTE ,unable to tolerate IPC ,would start heparin SC and monitor anemia.     Problem/Plan - 2:  ·  Problem: Acute back pain, unspecified back location, unspecified back pain laterality.  Plan: Acute on chronic lumbar radiculopathy to right side  vs r/o vascular claudication (c/o leg and thigh pain with hx of PVD)/no cellulitis to B/L extremities   pain management  vascular surgery consult  ortho spine consult  wound care for venous stasis ulcers.     Problem/Plan - 3:  ·  Problem: Acute kidney injury superimposed on CKD.  Plan: ARYA likely secondary to volume depletion due to slow GI blood loss   transfuse PRBC as ordered  monitor BMP  nephrology consult.     Problem/Plan - 4:  ·  Problem: Paroxysmal atrial fibrillation.  Plan: currently in sinus rhythm on EKG   not on AC due to hx of GI bleed.     Problem/Plan - 5:  ·  Problem: Chronic diastolic congestive heart failure.  Plan: chronic diastolic heart failure ,no clinical decompensation   continue meds as per reconcilliation. CHIEF COMPLAINT: anemia    SUBJECTIVE: currently has no complaints    Objective: nad, axox3    REVIEW OF SYSTEMS:    CONSTITUTIONAL: No weakness, fevers or chills  EYES/ENT: No visual changes;  No vertigo or throat pain   NECK: No pain or stiffness  RESPIRATORY: No cough, wheezing, hemoptysis; No shortness of breath  CARDIOVASCULAR: No chest pain or palpitations  GASTROINTESTINAL: No abdominal or epigastric pain. No nausea, vomiting, or hematemesis; No diarrhea or constipation. No melena or hematochezia.  GENITOURINARY: No dysuria, frequency or hematuria  NEUROLOGICAL: No numbness or weakness  SKIN: No itching, burning, rashes, or lesions   All other review of systems is negative unless indicated above    Vital Signs Last 24 Hrs  T(C): 36.6, Max: 36.7 (03-20 @ 22:39)  T(F): 97.9, Max: 98.1 (03-20 @ 22:39)  HR: 95 (90 - 112)  BP: 121/53 (121/53 - 140/63)  BP(mean): --  RR: 18 (16 - 18)  SpO2: 93% (93% - 100%)      PHYSICAL EXAM:    Constitutional: NAD, awake and alert, well-developed  HEENT: PERR, EOMI, Normal Hearing, MMM  Neck: Soft and supple, No LAD, No JVD  Respiratory: Breath sounds are clear bilaterally, No wheezing, rales or rhonchi  Cardiovascular: S1 and S2, regular rate and rhythm, no Murmurs, gallops or rubs  Gastrointestinal: Bowel Sounds present, soft, nontender, nondistended, no guarding, no rebound  Extremities: no edema le b/l, chronic venous stasis skin changes, amber boot on  Vascular: 2+ peripheral pulses  Neurological: A/O x 3, no focal deficits  Musculoskeletal: 5/5 strength b/l upper and lower extremities  Skin: No rashes    MEDICATIONS:  MEDICATIONS  (STANDING):  pantoprazole Infusion 8mG/Hr IV Continuous <Continuous>  finasteride 5milliGRAM(s) Oral daily  buDESOnide   0.5 milliGRAM(s) Respule 0.5milliGRAM(s) Nebulizer two times a day  aspirin enteric coated 81milliGRAM(s) Oral daily  doxazosin 8milliGRAM(s) Oral at bedtime  isosorbide   mononitrate ER Tablet (IMDUR) 120milliGRAM(s) Oral daily  diltiazem    Tablet 120milliGRAM(s) Oral two times a day  gabapentin Oral Tab/Cap - Peds 600milliGRAM(s) Oral at bedtime  gabapentin 300milliGRAM(s) Oral daily  allopurinol 100milliGRAM(s) Oral daily  loratadine 10milliGRAM(s) Oral daily  fenofibrate Tablet 145milliGRAM(s) Oral daily  simvastatin 10milliGRAM(s) Oral at bedtime  pramipexole 0.125milliGRAM(s) Oral three times a day  metoprolol Oral Tab/Cap - Peds 12.5milliGRAM(s) Oral two times a day  famotidine    Tablet 20milliGRAM(s) Oral at bedtime  ferrous    sulfate 325milliGRAM(s) Oral three times a day with meals  hydrALAZINE 150milliGRAM(s) Oral two times a day  pantoprazole    Tablet 40milliGRAM(s) Oral two times a day before meals  ammonium lactate 12% Lotion 1Application(s) Topical two times a day  heparin  Injectable 5000Unit(s) SubCutaneous every 8 hours      LABS: All Labs Reviewed:                        6.3    8.6   )-----------( 337      ( 20 Mar 2017 22:39 )             20.0     20 Mar 2017 22:39    147    |  114    |  114    ----------------------------<  124    4.4     |  23     |  3.51     Ca    8.1        20 Mar 2017 22:39    TPro  5.7    /  Alb  2.5    /  TBili  0.2    /  DBili  x      /  AST  19     /  ALT  14     /  AlkPhos  34     20 Mar 2017 22:39    PT/INR - ( 20 Mar 2017 22:39 )   PT: 11.3 sec;   INR: 1.03 ratio         PTT - ( 20 Mar 2017 22:39 )  PTT:33.6 sec  CARDIAC MARKERS ( 21 Mar 2017 03:42 )  0.018 ng/mL / x     / 107 U/L / x     / x          Assessment and Plan:    82 year old man w/ PMH of HTN, COPD on home O2 2L, SHAYY on nocturnal BIPAP, ex-smoker (smoked 1ppd X 50 years, quit 22 years ago),  Chronic diastolic CHF, CAD s/p PCI with stent in 2002,  Last cath in july 2014 with RCA disease medically managed, Hyperlipidemia, PVD, Iron deficiency anemia,Chronic back pain, Gout, BPH, CKD III with baseline Cr 2.2,  PAF not on a/c, Hx of GI Bleed in the past, hemorrhoids s/p banding, family hx of htn, dm, colon ca, now presenting with low hb. Patient states he went to his pcp and was found to have low hb and presented to ER. In Er he was found to have hb of 6.3.  Found to have bun/cr of 114/3.51.            male with PMHx of COPD, CHF, chronic renal failure, paroxysmal A-fib.with last hospitaliztion 10/2017 for hematochezia s/p bleeding scan and flex sigmoidoscopy  was BIBA for acute worsening lower back pain radiating to buttock to foot   patient with amber boots for chronic venous stasis ulcers and lower extremity chronic edema ,daughter reports increased oozing from ulcers in lai past few weeks,no fever  In Ed patient noted to have Hb 6 and stool guaiac positive  and rectal exam- with cookie  done by ED physician as per note   Patient denies any chest pain or worsening of SOB ,no abdominal pain or vomiting ,no diarrhea,no efver or chills      Assessment:  80 y/o male with PMHx of COPD, CHF, chronic renal failure, paroxysmal A-fib.with last hospitaliztion 10/2017 for hematochezia s/p bleeding scan and flex sigmoidoscopy  was BIBA for acute worsening lower back pain radiating to buttock to foot   patient with amber boots for chronic venous stasis ulcers and lower extremity chronic edema ,daughter reports increased oozing from ulcers in lai past few weeks,no fever  In Ed patient noted to have Hb 6 and stool guaiac positive  and rectal exam- with cookie  done by ED physician as per note   Patient denies any chest pain or worsening of SOB ,no abdominal pain or vomiting ,no diarrhea,no efver or chills      Problem/Plan - 1:  ·  Problem: Anemia due to blood loss.  Plan: Anemia secondary to slow upper GI bleed with cookie   Admit to tele until anemia improves after transfusion of PRBC  protonix Iv drip started in ED  GI consult  CBC q6 hrs   prn transfusion for Hb <8  plavix was stopped by his PMD feb 2017  would continue asa 81 for now due to hx of reccent cardiac stent in sept 2017  cardio consult for antiplatelet management  DVT prophylaxis- patient high risk for VTE ,unable to tolerate IPC ,would start heparin SC and monitor anemia.     Problem/Plan - 2:  ·  Problem: Acute back pain, unspecified back location, unspecified back pain laterality.  Plan: Acute on chronic lumbar radiculopathy to right side  vs r/o vascular claudication (c/o leg and thigh pain with hx of PVD)/no cellulitis to B/L extremities   pain management  vascular surgery consult  ortho spine consult  wound care for venous stasis ulcers.     Problem/Plan - 3:  ·  Problem: Acute kidney injury superimposed on CKD.  Plan: ARYA likely secondary to volume depletion due to slow GI blood loss   transfuse PRBC as ordered  monitor BMP  nephrology consult.     Problem/Plan - 4:  ·  Problem: Paroxysmal atrial fibrillation.  Plan: currently in sinus rhythm on EKG   not on AC due to hx of GI bleed.     Problem/Plan - 5:  ·  Problem: Chronic diastolic congestive heart failure.  Plan: chronic diastolic heart failure ,no clinical decompensation   continue meds as per reconcilliation. CHIEF COMPLAINT: anemia    SUBJECTIVE: currently has no complaints    Objective: nad, axox3    REVIEW OF SYSTEMS:    CONSTITUTIONAL: No weakness, fevers or chills  EYES/ENT: No visual changes;  No vertigo or throat pain   NECK: No pain or stiffness  RESPIRATORY: No cough, wheezing, hemoptysis; No shortness of breath  CARDIOVASCULAR: No chest pain or palpitations  GASTROINTESTINAL: No abdominal or epigastric pain. No nausea, vomiting, or hematemesis; No diarrhea or constipation. No melena or hematochezia.  GENITOURINARY: No dysuria, frequency or hematuria  NEUROLOGICAL: No numbness or weakness  SKIN: No itching, burning, rashes, or lesions   All other review of systems is negative unless indicated above    Vital Signs Last 24 Hrs  T(C): 36.6, Max: 36.7 (03-20 @ 22:39)  T(F): 97.9, Max: 98.1 (03-20 @ 22:39)  HR: 95 (90 - 112)  BP: 121/53 (121/53 - 140/63)  BP(mean): --  RR: 18 (16 - 18)  SpO2: 93% (93% - 100%)      PHYSICAL EXAM:    Constitutional: NAD, awake and alert, well-developed  HEENT: PERR, EOMI, Normal Hearing, MMM  Neck: Soft and supple, No LAD, No JVD  Respiratory: Breath sounds are clear bilaterally, No wheezing, rales or rhonchi  Cardiovascular: S1 and S2, regular rate and rhythm, no Murmurs, gallops or rubs  Gastrointestinal: Bowel Sounds present, soft, nontender, nondistended, no guarding, no rebound  Extremities: no edema le b/l, chronic venous stasis skin changes, amber boot on  Vascular: 2+ peripheral pulses  Neurological: A/O x 3, no focal deficits  Musculoskeletal: 5/5 strength b/l upper and lower extremities  Skin: No rashes    MEDICATIONS:  MEDICATIONS  (STANDING):  pantoprazole Infusion 8mG/Hr IV Continuous <Continuous>  finasteride 5milliGRAM(s) Oral daily  buDESOnide   0.5 milliGRAM(s) Respule 0.5milliGRAM(s) Nebulizer two times a day  aspirin enteric coated 81milliGRAM(s) Oral daily  doxazosin 8milliGRAM(s) Oral at bedtime  isosorbide   mononitrate ER Tablet (IMDUR) 120milliGRAM(s) Oral daily  diltiazem    Tablet 120milliGRAM(s) Oral two times a day  gabapentin Oral Tab/Cap - Peds 600milliGRAM(s) Oral at bedtime  gabapentin 300milliGRAM(s) Oral daily  allopurinol 100milliGRAM(s) Oral daily  loratadine 10milliGRAM(s) Oral daily  fenofibrate Tablet 145milliGRAM(s) Oral daily  simvastatin 10milliGRAM(s) Oral at bedtime  pramipexole 0.125milliGRAM(s) Oral three times a day  metoprolol Oral Tab/Cap - Peds 12.5milliGRAM(s) Oral two times a day  famotidine    Tablet 20milliGRAM(s) Oral at bedtime  ferrous    sulfate 325milliGRAM(s) Oral three times a day with meals  hydrALAZINE 150milliGRAM(s) Oral two times a day  pantoprazole    Tablet 40milliGRAM(s) Oral two times a day before meals  ammonium lactate 12% Lotion 1Application(s) Topical two times a day  heparin  Injectable 5000Unit(s) SubCutaneous every 8 hours      LABS: All Labs Reviewed:                        6.3    8.6   )-----------( 337      ( 20 Mar 2017 22:39 )             20.0     20 Mar 2017 22:39    147    |  114    |  114    ----------------------------<  124    4.4     |  23     |  3.51     Ca    8.1        20 Mar 2017 22:39    TPro  5.7    /  Alb  2.5    /  TBili  0.2    /  DBili  x      /  AST  19     /  ALT  14     /  AlkPhos  34     20 Mar 2017 22:39    PT/INR - ( 20 Mar 2017 22:39 )   PT: 11.3 sec;   INR: 1.03 ratio         PTT - ( 20 Mar 2017 22:39 )  PTT:33.6 sec  CARDIAC MARKERS ( 21 Mar 2017 03:42 )  0.018 ng/mL / x     / 107 U/L / x     / x          Assessment and Plan:    82 year old man w/ PMH of HTN, COPD on home O2 2L, SHAYY on nocturnal BIPAP, ex-smoker (smoked 1ppd X 50 years, quit 22 years ago),  Chronic diastolic CHF, CAD s/p PCI with stent in 2002, Last cath in july 2014 with RCA disease medically managed, Hyperlipidemia, PVD, Iron deficiency anemia,Chronic back pain, Gout, BPH, CKD III with baseline Cr 2.2,  PAF not on a/c, Hx of GI Bleed in the past 10/16 had sigmoidoscopy , hemorrhoids s/p banding, family hx of htn, dm, colon ca, now presenting with low hb. Patient states he went to his pcp and was found to have low hb and presented to ER. In Er he was found to have hb of 6.3.  Found to have bun/cr of 114/3.51.       1-Acute anemia , likely secondary to blood loss anemia  -Patients denies noticing any bllod in stool or urine, as well has any hemetemsis  -His baseline hb is around 8.4 , admitted with 6.3  -s/p 2 units; will order 1 more to keep hb above 8 given multiple co-morbidities  -His GI doc is Dr. Koch, whom will see patient in AM  -no active bleeding at this time. Will stop heparin drip and switch to protonix 40 IVPB q12h   - no immediate plans for scoping as per gi  -full liquid diet, will likely advance later, if no gi bleed  and patietn tolerates    2-CAD w/ recent stent placement  -cardio on board  -recco to take off aspirin temporarily till gi bleed resolves  -will re-evaluate when hb more stable      3-Chronic copd  w/ chronic respiratory failure on home o2 and bipap nightly  -c/w night bipap and supplemental o2  -currently comfortable and not in exacerbation    4- Acute renal failure on stage III CKD possibly secondary to ATN  -baseline creatinine is 2.4 (2017)  -admited with 3.51  -likley secondary to ATN secondary to to volume loss  -nephrology services on board  -will continue to volume replenish and repeat am bmp    5- Acute back pain  -on admit pateint c/o back pain, however he did not verbalize these concerns to me. This pain is likley acute on chronic  -spinal saw him, recco MRI  -Since paient not having acute pain at this time, will defer MRI for outpatient services          6-Paroxysmal atrial fibrillation.  Plan: currently in sinus rhythm on EKG   not on AC due to hx of GI bleed.     7-Chronic diastolic congestive heart failure.   -not in exacerbation    8-DVt proph- b/l SCDS only secondary to gi bleed

## 2017-03-21 NOTE — PROGRESS NOTE ADULT - SUBJECTIVE AND OBJECTIVE BOX
NEPHROLOGY INTERVAL HPI/OVERNIGHT EVENTS:    HPI:  HPI: :: 82 y/o male with PMHx of COPD, CHF, chronic renal failure, paroxysmal A-fib.with last hospitaliztion 10/2017 for hematochezia s/p bleeding scan and flex sigmoidoscopy  was BIBA for acute worsening lower back pain radiating to buttock to foot patient with amber boots for chronic venous stasis ulcers and lower extremity chronic edema ,daughter reports increased oozing from ulcers in the past few weeks, no fever..  In Ed patient noted to have Hb 6 and stool guaiac positive  and rectal exam- with melana  done by ED physician as per note   Patient denies any chest pain or worsening of SOB ,no abdominal pain or vomiting ,no diarrhea, no fever or chills..  Pt seen by Dr Tomas in the office, and has a baseline creat of 2+ on the outside labs.  Admitted w above symptom and creat elevation to 3.5 which is now improving to 3.2..  Pt has h/o hematochezia s well..  Legs in unna boots by Dr lCement  Pt states he has been having involuntary jerking of his muscles, legs and arm for th past several days    PMHx: ::  HTN,  COPD on home O2 2L,  SHAYY on nocturnal BIPAP,  Chronic diastolic CHF,  CAD s/p PCI with stent in 2002,  Last cath in july 2014 with RCA disease medically managed, Hyperlipidemia,  PVD,  Iron deficiency anemia,  Chronic back pain,  Gout,  BPH,  CKD III with baseline Cr 2.2,  PAF not on a/c,  Hx of GI Bleed in the past, hemorrhoids s/p banding,    PSHx: ::  Right rotator cuff repair  pilonidal cyst    Family History: ::  Father: rectal CA  Mother: HTN, DM  3 siblings: DM  Social History: ::  Tobacco: smoked 1ppd X 50 years, quit 22 years ago.  Rare ETOH use.  wife recently passed away last year (21 Mar 2017 06:35)      PAST MEDICAL & SURGICAL HISTORY:  Sepsis, due to unspecified organism: 2/2 poorly healing wounds b/l  BPH (benign prostatic hypertrophy)  Hyperlipemia  Coronary artery disease  Hypertension  Dyspepsia: On moderate exertion.  Sleep apnea, obstructive: Requires home 02 therapy, and treatment with BIPAP  Atelectasis  Pleural effusion, bilateral  Respiratory failure  Peripheral edema  CRI (chronic renal insufficiency)  Gout  CRF (chronic renal failure)  Benign prostatic hypertrophy  Spinal stenosis  Hypercholesterolemia  GERD (gastroesophageal reflux disease)  CAD (coronary artery disease)  Hypertension  S/P angioplasty with stent  Cataract of left eye  Prostate: Surgery green light procedure.  S/P rotator cuff surgery: Right  S/P angioplasty  Rotator cuff tear, right: repair      FAMILY HISTORY:  No pertinent family history in first degree relatives      MEDICATIONS  (STANDING):  pantoprazole Infusion 8mG/Hr IV Continuous <Continuous>  finasteride 5milliGRAM(s) Oral daily  buDESOnide   0.5 milliGRAM(s) Respule 0.5milliGRAM(s) Nebulizer two times a day  aspirin enteric coated 81milliGRAM(s) Oral daily  doxazosin 8milliGRAM(s) Oral at bedtime  isosorbide   mononitrate ER Tablet (IMDUR) 120milliGRAM(s) Oral daily  diltiazem    Tablet 120milliGRAM(s) Oral two times a day  gabapentin Oral Tab/Cap - Peds 600milliGRAM(s) Oral at bedtime  gabapentin 300milliGRAM(s) Oral daily  allopurinol 100milliGRAM(s) Oral daily  loratadine 10milliGRAM(s) Oral daily  fenofibrate Tablet 145milliGRAM(s) Oral daily  simvastatin 10milliGRAM(s) Oral at bedtime  pramipexole 0.125milliGRAM(s) Oral three times a day  metoprolol Oral Tab/Cap - Peds 12.5milliGRAM(s) Oral two times a day  famotidine    Tablet 20milliGRAM(s) Oral at bedtime  ferrous    sulfate 325milliGRAM(s) Oral three times a day with meals  hydrALAZINE 150milliGRAM(s) Oral two times a day  pantoprazole    Tablet 40milliGRAM(s) Oral two times a day before meals  ammonium lactate 12% Lotion 1Application(s) Topical two times a day  heparin  Injectable 5000Unit(s) SubCutaneous every 8 hours    MEDICATIONS  (PRN):  nitroglycerin     SubLingual 0.4milliGRAM(s) SubLingual every 5 minutes PRN Chest Pain      Allergies    No Known Allergies    Intolerances        I&O's Summary        REVIEW OF SYSTEMS:    CONSTITUTIONAL:  As per HPI.    HEENT:  Eyes:  No diplopia or blurred vision. ENT:  No earache, sore throat or runny nose.    CARDIOVASCULAR:  No pressure, squeezing, strangling, tightness, heaviness or aching about the chest, neck, axilla or epigastrium.    RESPIRATORY:  No cough, shortness of breath, PND or orthopnea.    GASTROINTESTINAL:  No nausea, vomiting or diarrhea. has constipation, abd distention gas    GENITOURINARY:  No dysuria, frequency or urgency.    MUSCULOSKELETAL:  As per HPI.    SKIN:  No change in skin, hair or nails.    NEUROLOGIC:  involuntary jerking     PSYCHIATRIC:  No disorder of thought or mood.    ENDOCRINE:  No heat or cold intolerance, polyuria or polydipsia.    HEMATOLOGICAL:  No easy bruising or bleeding.      Vital Signs Last 24 Hrs  T(C): 36.7, Max: 36.8 (03-21 @ 08:25)  T(F): 98, Max: 98.2 (03-21 @ 08:25)  HR: 93 (90 - 112)  BP: 127/40 (121/53 - 147/59)  BP(mean): --  RR: 18 (16 - 18)  SpO2: 100% (93% - 100%)  Daily Height in cm: 162.56 (20 Mar 2017 21:37)    Daily     PHYSICAL EXAM:    General:  Alert, well-developed ,No acute distress.    Neuro:  Alert and oriented to person, place, and time. Able to communicate  well.   Appropriate affect.     HEENT:  No JVD, no masses, Eyes anicteric, No carotid bruits. No lymphadenopathy,    Cardiovascular:  Regular rate and rhythm, with normal S1 and S2. No murmurs, rubs,  or gallops. No JVD.    Lungs:  Lungs clear. no rales, no wheezing, .    Abdomen:  Normoactive bowel sounds. Soft, flat, non-tender, distended.  No hepatosplenomegaly, positive bowel sounds    Skin:  Warm, dry, well-perfused. No rashes or other lesions.     Extremities:  legs in unna boots , foul odor       LABS:                        6.9    9.7   )-----------( 317      ( 21 Mar 2017 09:55 )             22.0     20 Mar 2017 22:39    147    |  114    |  114    ----------------------------<  124    4.4     |  23     |  3.51     Ca    8.1        20 Mar 2017 22:39    TPro  5.7    /  Alb  2.5    /  TBili  0.2    /  DBili  x      /  AST  19     /  ALT  14     /  AlkPhos  34     20 Mar 2017 22:39    PT/INR - ( 21 Mar 2017 09:55 )   PT: 11.9 sec;   INR: 1.08 ratio         PTT - ( 21 Mar 2017 09:55 )  PTT:35.5 sec

## 2017-03-21 NOTE — H&P ADULT - NSHPPHYSICALEXAM_GEN_ALL_CORE
PHYSICAL EXAM:    Daily Height in cm: 162.56 (20 Mar 2017 21:37)    Daily     ICU Vital Signs Last 24 Hrs  T(C): 36.6, Max: 36.7 (03-20 @ 22:39)  T(F): 97.8, Max: 98.1 (03-20 @ 22:39)  HR: 96 (95 - 112)  BP: 128/52 (125/79 - 140/63)  BP(mean): --  ABP: --  ABP(mean): --  RR: 16 (16 - 18)  SpO2: 100% (94% - 100%)      Constitutional: NAD on long term o2 via nasal cannula  HEENT: Atraumatic, DEBBIE, Normal, No congestion  Respiratory: Breath Sounds normal, no rhonchi/wheeze  Cardiovascular: N S1S2; BEN present  Gastrointestinal: Abdomen soft,distended non tender, Bowel Ssounds present  Extremities: B/l lower extremities 3+ edema with venous stasis ulcers with clear oozing   Neurological: AAO x 3, no gross focal motor deficits  Skin: Non cellulitic,     Musculoskeletal: lumbar spine tenderness to palpation,left inner thigh tenderness to palpation ,no echymosis or petechiae  Breasts: Deferred  Genitourinary: deferred  Rectal: Deferred

## 2017-03-21 NOTE — PROGRESS NOTE ADULT - SUBJECTIVE AND OBJECTIVE BOX
CARDIOLOGY AND ELECTROPHYSIOLOGY ASSESSMENT    HPI:  HPI: :: 81 y/o male with PMHx of COPD, CHF, chronic renal failure, paroxysmal A-fib.with last hospitaliztion 10/2017 for hematochezia s/p bleeding scan and flex sigmoidoscopy  was BIBA for acute worsening lower back pain radiating to buttock to foot   patient with amber boots for chronic venous stasis ulcers and lower extremity chronic edema ,daughter reports increased oozing from ulcers in lai past few weeks,no fever  In Ed patient noted to have Hb 6 and stool guaiac positive  and rectal exam- with cookie  done by ED physician as per note   Patient denies any chest pain or worsening of SOB ,no abdominal pain or vomiting ,no diarrhea,no efver or chills  PMHx: ::  HTN,  COPD on home O2 2L,  SHAYY on nocturnal BIPAP,  Chronic diastolic CHF,  CAD s/p PCI with stent in 2002,  Last cath in july 2014 with RCA disease medically managed, Hyperlipidemia,  PVD,  Iron deficiency anemia,  Chronic back pain,  Gout,  BPH,  CKD III with baseline Cr 2.2,  PAF not on a/c,  Hx of GI Bleed in the past, hemorrhoids s/p banding,  PSHx: ::  Right rotator cuff repair  pilonidal cyst  Family History: ::  Father: rectal CA  Mother: HTN, DM  3 siblings: DM  Social History: ::  Tobacco: smoked 1ppd X 50 years, quit 22 years ago.  Rare ETOH use.  wife recently passed away last year (21 Mar 2017 06:35)      PAST MEDICAL & SURGICAL HISTORY:  Sepsis, due to unspecified organism: 2/2 poorly healing wounds b/l  BPH (benign prostatic hypertrophy)  Hyperlipemia  Coronary artery disease  Hypertension  Dyspepsia: On moderate exertion.  Sleep apnea, obstructive: Requires home 02 therapy, and treatment with BIPAP  Atelectasis  Pleural effusion, bilateral  Respiratory failure  Peripheral edema  CRI (chronic renal insufficiency)  Gout  CRF (chronic renal failure)  Benign prostatic hypertrophy  Spinal stenosis  Hypercholesterolemia  GERD (gastroesophageal reflux disease)  CAD (coronary artery disease)  Hypertension  S/P angioplasty with stent  Cataract of left eye  Prostate: Surgery green light procedure.  S/P rotator cuff surgery: Right  S/P angioplasty  Rotator cuff tear, right: repair      MEDICATIONS  (STANDING):  pantoprazole Infusion 8mG/Hr IV Continuous <Continuous>  finasteride 5milliGRAM(s) Oral daily  buDESOnide   0.5 milliGRAM(s) Respule 0.5milliGRAM(s) Nebulizer two times a day  aspirin enteric coated 81milliGRAM(s) Oral daily  doxazosin 8milliGRAM(s) Oral at bedtime  isosorbide   mononitrate ER Tablet (IMDUR) 120milliGRAM(s) Oral daily  diltiazem    Tablet 120milliGRAM(s) Oral two times a day  gabapentin Oral Tab/Cap - Peds 600milliGRAM(s) Oral at bedtime  gabapentin 300milliGRAM(s) Oral daily  allopurinol 100milliGRAM(s) Oral daily  loratadine 10milliGRAM(s) Oral daily  fenofibrate Tablet 145milliGRAM(s) Oral daily  simvastatin 10milliGRAM(s) Oral at bedtime  pramipexole 0.125milliGRAM(s) Oral three times a day  metoprolol Oral Tab/Cap - Peds 12.5milliGRAM(s) Oral two times a day  famotidine    Tablet 20milliGRAM(s) Oral at bedtime  ferrous    sulfate 325milliGRAM(s) Oral three times a day with meals  hydrALAZINE 150milliGRAM(s) Oral two times a day  pantoprazole    Tablet 40milliGRAM(s) Oral two times a day before meals  ammonium lactate 12% Lotion 1Application(s) Topical two times a day  heparin  Injectable 5000Unit(s) SubCutaneous every 8 hours    MEDICATIONS  (PRN):  nitroglycerin     SubLingual 0.4milliGRAM(s) SubLingual every 5 minutes PRN Chest Pain      Allergies    No Known Allergies    Intolerances        SOCIAL HISTORY: Denies tobacco, etoh abuse or illicit drug use    FAMILY HISTORY:  No pertinent family history in first degree relatives      Vital Signs Last 24 Hrs  T(C): 36.6, Max: 36.7 (03-20 @ 22:39)  T(F): 97.9, Max: 98.1 (03-20 @ 22:39)  HR: 95 (90 - 112)  BP: 121/53 (121/53 - 140/63)  BP(mean): --  RR: 18 (16 - 18)  SpO2: 93% (93% - 100%)    REVIEW OF SYSTEMS:    CONSTITUTIONAL:  As per HPI.  HEENT:  Eyes:  No diplopia or blurred vision. ENT:  No earache, sore throat or runny nose.  CARDIOVASCULAR:  No pressure, squeezing, strangling, tightness, heaviness or aching about the chest, neck, axilla or epigastrium.  RESPIRATORY:  No cough, shortness of breath, PND or orthopnea.  GASTROINTESTINAL:  No nausea, vomiting or diarrhea.  GENITOURINARY:  No dysuria, frequency or urgency.  MUSCULOSKELETAL:  As per HPI.  SKIN:  No change in skin, hair or nails.  NEUROLOGIC:  No paresthesias, fasciculations, seizures or weakness.  PSYCHIATRIC:  No disorder of thought or mood.  ENDOCRINE:  No heat or cold intolerance, polyuria or polydipsia.  HEMATOLOGICAL:  No easy bruising or bleedings:  .     PHYSICAL EXAMINATION:    GENERAL APPEARANCE:  Pt. is not currently dyspneic, in no distress. Pt. is alert, oriented, and pleasant.  HEENT:  Pupils are normal and react normally. No icterus. Mucous membranes well colored.  NECK:  Supple. No lymphadenopathy. Jugular venous pressure not elevated. Carotids equal.   HEART:   The cardiac impulse has a normal quality. There are no murmurs, rubs or gallops noted  CHEST:  Chest is clear to auscultation. Normal respiratory effort.  ABDOMEN:  Soft and nontender.   EXTREMITIES:  There is no edema.   SKIN:  No rash or significant lesions are noted.    I&O's Summary      LABS:                        6.3    8.6   )-----------( 337      ( 20 Mar 2017 22:39 )             20.0   20 Mar 2017 22:39    147    |  114    |  114    ----------------------------<  124    4.4     |  23     |  3.51     Ca    8.1        20 Mar 2017 22:39    TPro  5.7    /  Alb  2.5    /  TBili  0.2    /  DBili  x      /  AST  19     /  ALT  14     /  AlkPhos  34     20 Mar 2017 22:39  LIVER FUNCTIONS - ( 20 Mar 2017 22:39 )  Alb: 2.5 g/dL / Pro: 5.7 gm/dL / ALK PHOS: 34 U/L / ALT: 14 U/L / AST: 19 U/L / GGT: x           PT/INR - ( 20 Mar 2017 22:39 )   PT: 11.3 sec;   INR: 1.03 ratio         PTT - ( 20 Mar 2017 22:39 )  PTT:33.6 secCARDIAC MARKERS ( 21 Mar 2017 03:42 )  0.018 ng/mL / x     / 107 U/L / x     / x                EKG:    Ventricular Rate 95 BPM    Atrial Rate 95 BPM    P-R Interval 208 ms    QRS Duration 88 ms    Q-T Interval 342 ms    QTC Calculation(Bezet) 429 ms    P Axis 31 degrees    R Axis 66 degrees    T Axis 40 degrees    Diagnosis Line Normal sinus rhythm  Normal ECG  When compared with ECG of 19-NOV-2016 16:34,  No significant change was found  Confirmed by JOMAR CADE MD ([id number]) on 3/21/2017 7:32:13 AM    TELEMETRY:    Normal sinus rhythm with no tachy or nelly events

## 2017-03-21 NOTE — H&P ADULT - PROBLEM SELECTOR PLAN 2
Acute on chronic lumbar radiculopathy to right side  vs r/o vascular claudication (c/o leg and thigh pain with hx of PVD)/no cellulitis to B/L extremities   pain management  vascular surgery consult  ortho spine consult  wound care for venous stasis ulcers

## 2017-03-21 NOTE — H&P ADULT - PROBLEM SELECTOR PLAN 1
Anemia secondary to slow upper GI bleed with cookie   Admit to tele until anemia improves after transfusion of PRBC  protonix Iv drip started in ED  GI consult  CBC q6 hrs   prn transfusion for Hb <8  plavix was stopped by his PMD feb 2017  would continue asa 81 for now due to hx of reccent cardiac stent in sept 2017  cardio consult for antiplatelet management  DVT prophylaxis- patient high risk for VTE ,unable to tolerate IPC ,would start heparin SC and monitor anemia

## 2017-03-21 NOTE — PROGRESS NOTE ADULT - PROBLEM SELECTOR PLAN 2
- now in NSR  - start amiodarone 400mg po bid to maintain NSR, it pt. goes into AF with RVR in setting of severe anemia, he will become more ischemic  - Baseline PFT's as outpatient then yearly to monitor pulmonary toxicity  - baseline and yearly ophthomology exam to monitor for amiodarone induced iritis  - baseline and yearly TFT's  - baseline and yearly LFT's - now in NSR  - start amiodarone 400mg po bid to maintain NSR, it pt. goes into AF with RVR in setting of severe anemia, he will become more ischemic.  will get pulm consult first given h/o home oxygen  - Baseline PFT's as outpatient then yearly to monitor pulmonary toxicity  - baseline and yearly ophthomology exam to monitor for amiodarone induced iritis  - baseline and yearly TFT's  - baseline and yearly LFT's

## 2017-03-21 NOTE — H&P ADULT - NSHPLABSRESULTS_GEN_ALL_CORE
6.3    8.6   )-----------( 337      ( 20 Mar 2017 22:39 )             20.0       CBC Full  -  ( 20 Mar 2017 22:39 )  WBC Count : 8.6 K/uL  Hemoglobin : 6.3 g/dL  Hematocrit : 20.0 %  Platelet Count - Automated : 337 K/uL  Mean Cell Volume : 100.5 fl  Mean Cell Hemoglobin : 31.5 pg  Mean Cell Hemoglobin Concentration : 31.4 gm/dL  Auto Neutrophil # : 7.1 K/uL  Auto Lymphocyte # : 0.6 K/uL  Auto Monocyte # : 0.6 K/uL  Auto Eosinophil # : 0.2 K/uL  Auto Basophil # : 0.1 K/uL  Auto Neutrophil % : 82.4 %  Auto Lymphocyte % : 7.0 %  Auto Monocyte % : 6.5 %  Auto Eosinophil % : 2.8 %  Auto Basophil % : 1.3 %      20 Mar 2017 22:39    147    |  114    |  114    ----------------------------<  124    4.4     |  23     |  3.51     Ca    8.1        20 Mar 2017 22:39    TPro  5.7    /  Alb  2.5    /  TBili  0.2    /  DBili  x      /  AST  19     /  ALT  14     /  AlkPhos  34     20 Mar 2017 22:39      LIVER FUNCTIONS - ( 20 Mar 2017 22:39 )  Alb: 2.5 g/dL / Pro: 5.7 gm/dL / ALK PHOS: 34 U/L / ALT: 14 U/L / AST: 19 U/L / GGT: x             PT/INR - ( 20 Mar 2017 22:39 )   PT: 11.3 sec;   INR: 1.03 ratio         PTT - ( 20 Mar 2017 22:39 )  PTT:33.6 sec    CARDIAC MARKERS ( 21 Mar 2017 03:42 )  0.018 ng/mL / x     / 107 U/L / x     / x

## 2017-03-21 NOTE — H&P ADULT - HISTORY OF PRESENT ILLNESS
HPI: :: 80 y/o male with PMHx of COPD, CHF, chronic renal failure, paroxysmal A-fib.with last hospitaliztion 10/2017 for hematochezia s/p bleeding scan and flex sigmoidoscopy  was BIBA for acute worsening lower back pain radiating to buttock to foot   patient with amber boots for chronic venous stasis ulcers and lower extremity chronic edema ,daughter reports increased oozing from ulcers in lai past few weeks,no fever  In Ed patient noted to have Hb 6 and stool guaiac positive  and rectal exam- with cookie  done by ED physician as per note   Patient denies any chest pain or worsening of SOB ,no abdominal pain or vomiting ,no diarrhea,no efver or chills  PMHx: ::  HTN,  COPD on home O2 2L,  SHAYY on nocturnal BIPAP,  Chronic diastolic CHF,  CAD s/p PCI with stent in 2002,  Last cath in july 2014 with RCA disease medically managed, Hyperlipidemia,  PVD,  Iron deficiency anemia,  Chronic back pain,  Gout,  BPH,  CKD III with baseline Cr 2.2,  PAF not on a/c,  Hx of GI Bleed in the past, hemorrhoids s/p banding,  PSHx: ::  Right rotator cuff repair  pilonidal cyst  Family History: ::  Father: rectal CA  Mother: HTN, DM  3 siblings: DM  Social History: ::  Tobacco: smoked 1ppd X 50 years, quit 22 years ago.  Rare ETOH use.  wife recently passed away last year

## 2017-03-21 NOTE — H&P ADULT - PROBLEM SELECTOR PLAN 3
ARYA likely secondary to volume depletion due to slow GI blood loss   transfuse PRBC as ordered  monitor BMP  nephrology consult

## 2017-03-21 NOTE — PROGRESS NOTE ADULT - ASSESSMENT
82 year old male w/h/o CAD s/p multiple PCI, AF not on oral anticoagulation due to severe GIB who presents with anemia likely secondary to recurrent GIB

## 2017-03-21 NOTE — PATIENT PROFILE ADULT. - PSH
Cataract of left eye    Prostate  Surgery green light procedure.  S/P angioplasty    S/P angioplasty with stent    S/P rotator cuff surgery  Right

## 2017-03-21 NOTE — H&P ADULT - ASSESSMENT
80 y/o male with PMHx of COPD, CHF, chronic renal failure, paroxysmal A-fib.with last hospitaliztion 10/2017 for hematochezia s/p bleeding scan and flex sigmoidoscopy  was BIBA for acute worsening lower back pain radiating to buttock to foot   patient with amber boots for chronic venous stasis ulcers and lower extremity chronic edema ,daughter reports increased oozing from ulcers in lai past few weeks,no fever  In Ed patient noted to have Hb 6 and stool guaiac positive  and rectal exam- with cookie  done by ED physician as per note   Patient denies any chest pain or worsening of SOB ,no abdominal pain or vomiting ,no diarrhea,no efver or chills  PMHx: :: 82 y/o male with PMHx of COPD, CHF, chronic renal failure, paroxysmal A-fib.with last hospitaliztion 10/2017 for hematochezia s/p bleeding scan and flex sigmoidoscopy  was BIBA for acute worsening lower back pain radiating to buttock to foot   patient with amber boots for chronic venous stasis ulcers and lower extremity chronic edema ,daughter reports increased oozing from ulcers in lai past few weeks,no fever  In Ed patient noted to have Hb 6 and stool guaiac positive  and rectal exam- with cookie  done by ED physician as per note   Patient denies any chest pain or worsening of SOB ,no abdominal pain or vomiting ,no diarrhea,no efver or chills

## 2017-03-21 NOTE — PROGRESS NOTE ADULT - PROBLEM SELECTOR PLAN 1
- severely anemic  - recommend transfusion to keep Hgb >8  - would stop asa for now.  Discussed with Dr. Sosa who agrees

## 2017-03-22 LAB
ANION GAP SERPL CALC-SCNC: 11 MMOL/L — SIGNIFICANT CHANGE UP (ref 5–17)
BUN SERPL-MCNC: 105 MG/DL — HIGH (ref 7–23)
CALCIUM SERPL-MCNC: 7.9 MG/DL — LOW (ref 8.5–10.1)
CALCIUM SERPL-MCNC: 8 MG/DL — LOW (ref 8.4–10.5)
CHLORIDE SERPL-SCNC: 115 MMOL/L — HIGH (ref 96–108)
CO2 SERPL-SCNC: 23 MMOL/L — SIGNIFICANT CHANGE UP (ref 22–31)
CREAT SERPL-MCNC: 2.83 MG/DL — HIGH (ref 0.5–1.3)
GLUCOSE SERPL-MCNC: 110 MG/DL — HIGH (ref 70–99)
HAPTOGLOB SERPL-MCNC: 226 MG/DL — HIGH (ref 34–200)
HCT VFR BLD CALC: 23.5 % — LOW (ref 39–50)
HCT VFR BLD CALC: 24 % — LOW (ref 39–50)
HCT VFR BLD CALC: 24.8 % — LOW (ref 39–50)
HCT VFR BLD CALC: 25 % — LOW (ref 39–50)
HGB BLD-MCNC: 7.4 G/DL — LOW (ref 13–17)
HGB BLD-MCNC: 7.7 G/DL — LOW (ref 13–17)
HGB BLD-MCNC: 7.9 G/DL — LOW (ref 13–17)
HGB BLD-MCNC: 8.1 G/DL — LOW (ref 13–17)
MCHC RBC-ENTMCNC: 30.8 PG — SIGNIFICANT CHANGE UP (ref 27–34)
MCHC RBC-ENTMCNC: 32 GM/DL — SIGNIFICANT CHANGE UP (ref 32–36)
MCV RBC AUTO: 96.3 FL — SIGNIFICANT CHANGE UP (ref 80–100)
PLATELET # BLD AUTO: 288 K/UL — SIGNIFICANT CHANGE UP (ref 150–400)
POTASSIUM SERPL-MCNC: 4.5 MMOL/L — SIGNIFICANT CHANGE UP (ref 3.5–5.3)
POTASSIUM SERPL-SCNC: 4.5 MMOL/L — SIGNIFICANT CHANGE UP (ref 3.5–5.3)
PTH-INTACT FLD-MCNC: 49 PG/ML — SIGNIFICANT CHANGE UP (ref 15–65)
RBC # BLD: 2.5 M/UL — LOW (ref 4.2–5.8)
RBC # FLD: 19.9 % — HIGH (ref 10.3–14.5)
SODIUM SERPL-SCNC: 149 MMOL/L — HIGH (ref 135–145)
WBC # BLD: 11.7 K/UL — HIGH (ref 3.8–10.5)
WBC # FLD AUTO: 11.7 K/UL — HIGH (ref 3.8–10.5)

## 2017-03-22 RX ORDER — ACETAMINOPHEN 500 MG
650 TABLET ORAL EVERY 6 HOURS
Qty: 0 | Refills: 0 | Status: DISCONTINUED | OUTPATIENT
Start: 2017-03-22 | End: 2017-03-30

## 2017-03-22 RX ORDER — FUROSEMIDE 40 MG
40 TABLET ORAL DAILY
Qty: 0 | Refills: 0 | Status: DISCONTINUED | OUTPATIENT
Start: 2017-03-23 | End: 2017-03-23

## 2017-03-22 RX ORDER — PANTOPRAZOLE SODIUM 20 MG/1
8 TABLET, DELAYED RELEASE ORAL
Qty: 80 | Refills: 0 | Status: DISCONTINUED | OUTPATIENT
Start: 2017-03-22 | End: 2017-03-23

## 2017-03-22 RX ADMIN — Medication 0.5 MILLIGRAM(S): at 08:02

## 2017-03-22 RX ADMIN — SIMVASTATIN 10 MILLIGRAM(S): 20 TABLET, FILM COATED ORAL at 23:03

## 2017-03-22 RX ADMIN — Medication 325 MILLIGRAM(S): at 10:16

## 2017-03-22 RX ADMIN — Medication 8 MILLIGRAM(S): at 23:02

## 2017-03-22 RX ADMIN — LORATADINE 10 MILLIGRAM(S): 10 TABLET ORAL at 12:49

## 2017-03-22 RX ADMIN — PRAMIPEXOLE DIHYDROCHLORIDE 0.12 MILLIGRAM(S): 0.12 TABLET ORAL at 06:32

## 2017-03-22 RX ADMIN — Medication 650 MILLIGRAM(S): at 17:58

## 2017-03-22 RX ADMIN — Medication 150 MILLIGRAM(S): at 06:31

## 2017-03-22 RX ADMIN — PANTOPRAZOLE SODIUM 10 MG/HR: 20 TABLET, DELAYED RELEASE ORAL at 17:52

## 2017-03-22 RX ADMIN — GABAPENTIN 100 MILLIGRAM(S): 400 CAPSULE ORAL at 12:49

## 2017-03-22 RX ADMIN — PRAMIPEXOLE DIHYDROCHLORIDE 0.12 MILLIGRAM(S): 0.12 TABLET ORAL at 23:03

## 2017-03-22 RX ADMIN — Medication 1 APPLICATION(S): at 06:33

## 2017-03-22 RX ADMIN — Medication 650 MILLIGRAM(S): at 23:01

## 2017-03-22 RX ADMIN — Medication 100 MILLIGRAM(S): at 12:49

## 2017-03-22 RX ADMIN — Medication 1 APPLICATION(S): at 17:58

## 2017-03-22 RX ADMIN — Medication 12.5 MILLIGRAM(S): at 17:50

## 2017-03-22 RX ADMIN — Medication 145 MILLIGRAM(S): at 12:49

## 2017-03-22 RX ADMIN — HEPARIN SODIUM 5000 UNIT(S): 5000 INJECTION INTRAVENOUS; SUBCUTANEOUS at 06:32

## 2017-03-22 RX ADMIN — GABAPENTIN 600 MILLIGRAM(S): 400 CAPSULE ORAL at 23:02

## 2017-03-22 RX ADMIN — Medication 650 MILLIGRAM(S): at 03:48

## 2017-03-22 RX ADMIN — Medication 240 MILLIGRAM(S): at 06:26

## 2017-03-22 RX ADMIN — Medication 0.5 MILLIGRAM(S): at 21:04

## 2017-03-22 RX ADMIN — FINASTERIDE 5 MILLIGRAM(S): 5 TABLET, FILM COATED ORAL at 12:49

## 2017-03-22 RX ADMIN — Medication 12.5 MILLIGRAM(S): at 06:26

## 2017-03-22 RX ADMIN — Medication 325 MILLIGRAM(S): at 17:53

## 2017-03-22 RX ADMIN — Medication 1 SUPPOSITORY(S): at 12:50

## 2017-03-22 RX ADMIN — PRAMIPEXOLE DIHYDROCHLORIDE 0.12 MILLIGRAM(S): 0.12 TABLET ORAL at 16:24

## 2017-03-22 RX ADMIN — Medication 325 MILLIGRAM(S): at 12:49

## 2017-03-22 RX ADMIN — SENNA PLUS 2 TABLET(S): 8.6 TABLET ORAL at 23:03

## 2017-03-22 RX ADMIN — PANTOPRAZOLE SODIUM 40 MILLIGRAM(S): 20 TABLET, DELAYED RELEASE ORAL at 06:26

## 2017-03-22 RX ADMIN — Medication 150 MILLIGRAM(S): at 17:52

## 2017-03-22 RX ADMIN — ISOSORBIDE MONONITRATE 120 MILLIGRAM(S): 60 TABLET, EXTENDED RELEASE ORAL at 12:49

## 2017-03-22 NOTE — PROGRESS NOTE ADULT - ASSESSMENT
82 year old male w/h/o CAD s/p multiple PCI, AF not on oral anticoagulation due to severe GIB who presents with anemia likely secondary to recurrent GIB..  Will evaluate for Iron defiviency sheri be candidate for IV Iron, procrit..  Check for secondary hyperpara,   ARYA, CKD  most likely hemodynamic changes and will come down to baseline..  will order glycerine suppository, and senakot bid.. NO phoshate or magnesium containing preps due to electrolyte load..  Reduce gabapentin to 100 in am 300 pm ( from 300 and 600), most likely cause of the involuntary mm jerks  d/w Dr Garcia..    3/22 MK  - ARYA/CKD  stage 4 (scr 2.5) due to volume depletion, will hold lasix for now and fu response to transfusion  - AOCD: possible a CKD component, will obtain iron studies etc and plan for epogen based upon the results.    - Hx of GI bleed: GI input noted 82 year old male w/h/o CAD s/p multiple PCI, AF not on oral anticoagulation due to severe GIB who presents with anemia likely secondary to recurrent GIB..  Will evaluate for Iron defiviency sheri be candidate for IV Iron, procrit..  Check for secondary hyperpara,   ARYA, CKD  most likely hemodynamic changes and will come down to baseline..  will order glycerine suppository, and senakot bid.. NO phoshate or magnesium containing preps due to electrolyte load..  Reduce gabapentin to 100 in am 300 pm ( from 300 and 600), most likely cause of the involuntary mm jerks  d/w Dr Garcia..    3/22 MK  - ARYA/CKD  stage 4 (scr 2.5) due to volume depletion, will hold lasix for now and fu response to transfusion.  start lasix tomorrow, if scr improving  - AOCD: possible a CKD component, will obtain iron studies etc and plan for epogen based upon the results.    - Hx of GI bleed: GI input noted  - Hypernatremia: po fluid intake

## 2017-03-22 NOTE — CHART NOTE - NSCHARTNOTEFT_GEN_A_CORE
Notified by RN that H&H s/p 3 units of Prbc 7.7/24.Will transfuse 1 U of Prbc to maintain h&h above 8 follow up h&h in AM.  No crackles on lung ausculation.

## 2017-03-22 NOTE — PROGRESS NOTE ADULT - ASSESSMENT
Anemia- Management pre primary team and GI team    CAD, s/p PCI and atherectomy- pt is off anticoagulation and antiplatelet agents. Benefits outweigh risks with antiplatelet agents now.  Continue statin.    HTN- continue current meds.    Afib- in sinus rythm. Per EP team pt to be evaluated by pulmonary team prior to anticipated amiodarone start for afib.  off anticoagulation secondary to severe recurrent GI bleed.    Thank you for allowing me to participate in the care of this patient. Please feel free to contact me with any questions.

## 2017-03-22 NOTE — PROGRESS NOTE ADULT - SUBJECTIVE AND OBJECTIVE BOX
NEPHROLOGY INTERVAL HPI/OVERNIGHT EVENTS:  doing well, no c/o pain in legs  tolerating po     PMHx: ::  HTN,  COPD on home O2 2L,  SHAYY on nocturnal BIPAP,  Chronic diastolic CHF,  CAD s/p PCI with stent in ,  Last cath in 2014 with RCA disease medically managed, Hyperlipidemia,  PVD,  Iron deficiency anemia,  Chronic back pain,  Gout,  BPH,  CKD III with baseline Cr 2.2,  PAF not on a/c,  Hx of GI Bleed in the past, hemorrhoids s/p banding,  PSHx: ::  Right rotator cuff repair  pilonidal cyst  Family History: ::  Father: rectal CA  Mother: HTN, DM  3 siblings: DM  Social History: ::  Tobacco: smoked 1ppd X 50 years, quit 22 years ago.  Rare ETOH use.  wife recently passed away last year (21 Mar 2017 06:35)            MEDICATIONS  (STANDING):  finasteride 5milliGRAM(s) Oral daily  buDESOnide   0.5 milliGRAM(s) Respule 0.5milliGRAM(s) Nebulizer two times a day  doxazosin 8milliGRAM(s) Oral at bedtime  isosorbide   mononitrate ER Tablet (IMDUR) 120milliGRAM(s) Oral daily  gabapentin Oral Tab/Cap - Peds 600milliGRAM(s) Oral at bedtime  allopurinol 100milliGRAM(s) Oral daily  loratadine 10milliGRAM(s) Oral daily  fenofibrate Tablet 145milliGRAM(s) Oral daily  simvastatin 10milliGRAM(s) Oral at bedtime  pramipexole 0.125milliGRAM(s) Oral three times a day  metoprolol Oral Tab/Cap - Peds 12.5milliGRAM(s) Oral two times a day  ferrous    sulfate 325milliGRAM(s) Oral three times a day with meals  hydrALAZINE 150milliGRAM(s) Oral two times a day  ammonium lactate 12% Lotion 1Application(s) Topical two times a day  diltiazem   CD 240milliGRAM(s) Oral daily  gabapentin 100milliGRAM(s) Oral daily  gabapentin 300milliGRAM(s) Oral at bedtime  senna 2Tablet(s) Oral at bedtime  glycerin Suppository - Adult 1Suppository(s) Rectal daily  pantoprazole Infusion 8mG/Hr IV Continuous <Continuous>    MEDICATIONS  (PRN):  nitroglycerin     SubLingual 0.4milliGRAM(s) SubLingual every 5 minutes PRN Chest Pain  acetaminophen   Tablet 650milliGRAM(s) Oral every 6 hours PRN For Temp greater than 38 C (100.4 F)      Allergies    No Known Allergies    Intolerances        I&O's Detail    I & Os for current day (as of 22 Mar 2017 13:25)  =============================================  IN:    Total IN: 0 ml  ---------------------------------------------  OUT:    Voided: 300 ml    Total OUT: 300 ml  ---------------------------------------------  Total NET: -300 ml        Vital Signs Last 24 Hrs  T(C): 36.9, Max: 37.7 ( @ 20:32)  T(F): 98.4, Max: 99.9 ( @ 20:32)  HR: 66 (66 - 100)  BP: 151/38 (128/35 - 157/45)  BP(mean): --  RR: 18 (16 - 18)  SpO2: 99% (95% - 100%)  Daily     Daily Weight in k.5 (22 Mar 2017 10:35)    PHYSICAL EXAM:  General: alert. awake Ox3  HEENT: MMM  CV: s1s2 rrr  LUNGS: B/L CTA  EXT: LE dressing in place    LABS:                        8.1    x     )-----------( x        ( 22 Mar 2017 12:03 )             24.8     22 Mar 2017 05:59    149    |  115    |  105    ----------------------------<  110    4.5     |  23     |  2.83     Ca    7.9        22 Mar 2017 05:59    TPro  5.7    /  Alb  2.4    /  TBili  0.5    /  DBili  x      /  AST  23     /  ALT  11     /  AlkPhos  35     21 Mar 2017 09:55    PT/INR - ( 21 Mar 2017 09:55 )   PT: 11.9 sec;   INR: 1.08 ratio         PTT - ( 21 Mar 2017 09:55 )  PTT:35.5 sec    Intact PTH: 49 pg/mL ( @ 17:12)

## 2017-03-22 NOTE — PROGRESS NOTE ADULT - SUBJECTIVE AND OBJECTIVE BOX
82 year old male with hx of chronic venous stasis ulcers, CHF, AFIB, Anemia. Pt with long standing hx of chronic low back pain. Pain radiates to posterior aspect of RLE to the right foot and posterior aspect of the left thigh. Pt has numbness and weakness of b/l lower extremities. Pt denies bowel and bladder incontinence. Standing alleviates the pain temporarily, whereas walking half a block worsens the pain.     PE  Gen appearance: NAD  Motor strength: 5-/5 of b/l HF, 4/5 of b/l quads, unable to test EHL, Ant tib and gastroc due to bandages and pt states palpation causes pain.   Sensation: denies numbness of b/l thighs and shins. Unable to assess b/l feet due to bandages.  Non-tender to palpation of the lumbar spine.    Plan  Conservative management with physical therapy and analgesics recommended.  No urgent surgical indications.   Continue care and recommendations per Cardiology, Medicine, GI  Discussed with Dr. Stark  MRI of the lumbar spine reviewed with mild disc degeneration at L2-3 through L4-5 with b/l neural foramina. Facet osteophytic hypertrophy at L3-4 and L5-S1 with   Multiple posterior projection synoival cyt involving left L3-4, b/l L4-5 and B/l L5-S1 facet joint.

## 2017-03-22 NOTE — PROGRESS NOTE ADULT - SUBJECTIVE AND OBJECTIVE BOX
CHIEF COMPLAINT:    SUBJECTIVE:     REVIEW OF SYSTEMS:    CONSTITUTIONAL: No weakness, fevers or chills  EYES/ENT: No visual changes;  No vertigo or throat pain   NECK: No pain or stiffness  RESPIRATORY: No cough, wheezing, hemoptysis; No shortness of breath  CARDIOVASCULAR: No chest pain or palpitations  GASTROINTESTINAL: No abdominal or epigastric pain. No nausea, vomiting, or hematemesis; No diarrhea or constipation. No melena or hematochezia.  GENITOURINARY: No dysuria, frequency or hematuria  NEUROLOGICAL: No numbness or weakness  SKIN: No itching, burning, rashes, or lesions   All other review of systems is negative unless indicated above    Vital Signs Last 24 Hrs  T(C): 36.9, Max: 37.7 (03-21 @ 20:32)  T(F): 98.4, Max: 99.9 (03-21 @ 20:32)  HR: 66 (66 - 100)  BP: 151/38 (128/35 - 157/45)  BP(mean): --  RR: 18 (16 - 18)  SpO2: 99% (95% - 100%)    I&O's Summary    I & Os for current day (as of 22 Mar 2017 12:13)  =============================================  IN: 0 ml / OUT: 300 ml / NET: -300 ml      CAPILLARY BLOOD GLUCOSE      PHYSICAL EXAM:    Constitutional: NAD, awake and alert, well-developed  HEENT: PERR, EOMI, Normal Hearing, MMM  Neck: Soft and supple, No LAD, No JVD  Respiratory: Breath sounds are clear bilaterally, No wheezing, rales or rhonchi  Cardiovascular: S1 and S2, regular rate and rhythm, no Murmurs, gallops or rubs  Gastrointestinal: Bowel Sounds present, soft, nontender, nondistended, no guarding, no rebound  Extremities: No peripheral edema  Vascular: 2+ peripheral pulses  Neurological: A/O x 3, no focal deficits  Musculoskeletal: 5/5 strength b/l upper and lower extremities  Skin: No rashes    MEDICATIONS:  MEDICATIONS  (STANDING):  finasteride 5milliGRAM(s) Oral daily  buDESOnide   0.5 milliGRAM(s) Respule 0.5milliGRAM(s) Nebulizer two times a day  doxazosin 8milliGRAM(s) Oral at bedtime  isosorbide   mononitrate ER Tablet (IMDUR) 120milliGRAM(s) Oral daily  gabapentin Oral Tab/Cap - Peds 600milliGRAM(s) Oral at bedtime  allopurinol 100milliGRAM(s) Oral daily  loratadine 10milliGRAM(s) Oral daily  fenofibrate Tablet 145milliGRAM(s) Oral daily  simvastatin 10milliGRAM(s) Oral at bedtime  pramipexole 0.125milliGRAM(s) Oral three times a day  metoprolol Oral Tab/Cap - Peds 12.5milliGRAM(s) Oral two times a day  ferrous    sulfate 325milliGRAM(s) Oral three times a day with meals  hydrALAZINE 150milliGRAM(s) Oral two times a day  ammonium lactate 12% Lotion 1Application(s) Topical two times a day  diltiazem   CD 240milliGRAM(s) Oral daily  gabapentin 100milliGRAM(s) Oral daily  gabapentin 300milliGRAM(s) Oral at bedtime  senna 2Tablet(s) Oral at bedtime  glycerin Suppository - Adult 1Suppository(s) Rectal daily  pantoprazole Infusion 8mG/Hr IV Continuous <Continuous>      LABS: All Labs Reviewed:                        7.4    x     )-----------( x        ( 22 Mar 2017 05:59 )             23.5     22 Mar 2017 05:59    149    |  115    |  105    ----------------------------<  110    4.5     |  23     |  2.83     Ca    7.9        22 Mar 2017 05:59    TPro  5.7    /  Alb  2.4    /  TBili  0.5    /  DBili  x      /  AST  23     /  ALT  11     /  AlkPhos  35     21 Mar 2017 09:55    PT/INR - ( 21 Mar 2017 09:55 )   PT: 11.9 sec;   INR: 1.08 ratio         PTT - ( 21 Mar 2017 09:55 )  PTT:35.5 sec  CARDIAC MARKERS ( 21 Mar 2017 17:23 )  0.028 ng/mL / x     / 196 U/L / x     / x      CARDIAC MARKERS ( 21 Mar 2017 09:55 )  0.023 ng/mL / x     / 172 U/L / x     / x      CARDIAC MARKERS ( 21 Mar 2017 03:42 )  0.018 ng/mL / x     / 107 U/L / x     / x          Blood Culture: 03-21 @ 03:41  Organism --  Gram Stain Blood -- Gram Stain --  Specimen Source .Blood Blood  Culture-Blood --    03-20 @ 22:38  Organism --  Gram Stain Blood -- Gram Stain --  Specimen Source .Blood None  Culture-Blood -- CHIEF COMPLAINT:low hb    SUBJECTIVE: no complaints other then feeling sleepy. states everyone kept him up last night.     Objective: NAd, alert and oriented x 3    REVIEW OF SYSTEMS:    CONSTITUTIONAL: No weakness, fevers or chills  EYES/ENT: No visual changes;  No vertigo or throat pain   NECK: No pain or stiffness  RESPIRATORY: No cough, wheezing, hemoptysis; No shortness of breath  CARDIOVASCULAR: No chest pain or palpitations  GASTROINTESTINAL: No abdominal or epigastric pain. No nausea, vomiting, or hematemesis; No diarrhea or constipation. No melena or hematochezia.  GENITOURINARY: No dysuria, frequency or hematuria  NEUROLOGICAL: No numbness or weakness  SKIN: No itching, burning, rashes, or lesions   All other review of systems is negative unless indicated above    Vital Signs Last 24 Hrs  T(C): 36.9, Max: 37.7 (03-21 @ 20:32)  T(F): 98.4, Max: 99.9 (03-21 @ 20:32)  HR: 66 (66 - 100)  BP: 151/38 (128/35 - 157/45)  BP(mean): --  RR: 18 (16 - 18)  SpO2: 99% (95% - 100%)    I&O's Summary    I & Os for current day (as of 22 Mar 2017 12:13)  =============================================  IN: 0 ml / OUT: 300 ml / NET: -300 ml        PHYSICAL EXAM:    Constitutional: NAD, awake and alert, well-developed  HEENT: PERR, EOMI, Normal Hearing, MMM  Neck: Soft and supple, No LAD, No JVD  Respiratory: Breath sounds are clear bilaterally, No wheezing, rales or rhonchi  Cardiovascular: S1 and S2, regular rate and rhythm, no Murmurs, gallops or rubs  Gastrointestinal: Bowel Sounds present, soft, nontender, nondistended, no guarding, no rebound  Extremities: No peripheral edema; chronic venous statis changes; right leg lateral aspect stage III ulcer  Vascular: 2+ peripheral pulses  Neurological: A/O x 3, no focal deficits  Musculoskeletal: 5/5 strength b/l upper and lower extremities  Skin: No rashes    MEDICATIONS:  MEDICATIONS  (STANDING):  finasteride 5milliGRAM(s) Oral daily  buDESOnide   0.5 milliGRAM(s) Respule 0.5milliGRAM(s) Nebulizer two times a day  doxazosin 8milliGRAM(s) Oral at bedtime  isosorbide   mononitrate ER Tablet (IMDUR) 120milliGRAM(s) Oral daily  gabapentin Oral Tab/Cap - Peds 600milliGRAM(s) Oral at bedtime  allopurinol 100milliGRAM(s) Oral daily  loratadine 10milliGRAM(s) Oral daily  fenofibrate Tablet 145milliGRAM(s) Oral daily  simvastatin 10milliGRAM(s) Oral at bedtime  pramipexole 0.125milliGRAM(s) Oral three times a day  metoprolol Oral Tab/Cap - Peds 12.5milliGRAM(s) Oral two times a day  ferrous    sulfate 325milliGRAM(s) Oral three times a day with meals  hydrALAZINE 150milliGRAM(s) Oral two times a day  ammonium lactate 12% Lotion 1Application(s) Topical two times a day  diltiazem   CD 240milliGRAM(s) Oral daily  gabapentin 100milliGRAM(s) Oral daily  gabapentin 300milliGRAM(s) Oral at bedtime  senna 2Tablet(s) Oral at bedtime  glycerin Suppository - Adult 1Suppository(s) Rectal daily  pantoprazole Infusion 8mG/Hr IV Continuous <Continuous>      LABS: All Labs Reviewed:                        7.4    x     )-----------( x        ( 22 Mar 2017 05:59 )             23.5     22 Mar 2017 05:59    149    |  115    |  105    ----------------------------<  110    4.5     |  23     |  2.83     Ca    7.9        22 Mar 2017 05:59    TPro  5.7    /  Alb  2.4    /  TBili  0.5    /  DBili  x      /  AST  23     /  ALT  11     /  AlkPhos  35     21 Mar 2017 09:55    PT/INR - ( 21 Mar 2017 09:55 )   PT: 11.9 sec;   INR: 1.08 ratio         PTT - ( 21 Mar 2017 09:55 )  PTT:35.5 sec  CARDIAC MARKERS ( 21 Mar 2017 17:23 )  0.028 ng/mL / x     / 196 U/L / x     / x      CARDIAC MARKERS ( 21 Mar 2017 09:55 )  0.023 ng/mL / x     / 172 U/L / x     / x      CARDIAC MARKERS ( 21 Mar 2017 03:42 )  0.018 ng/mL / x     / 107 U/L / x     / x          Blood Culture: 03-21 @ 03:41  Organism --  Gram Stain Blood -- Gram Stain --  Specimen Source .Blood Blood  Culture-Blood --    03-20 @ 22:38  Organism --  Gram Stain Blood -- Gram Stain --  Specimen Source .Blood None  Culture-Blood --      82 year old man w/ PMH of HTN, COPD on home O2 2L, SHAYY on nocturnal BIPAP, ex-smoker (smoked 1ppd X 50 years, quit 22 years ago),  Chronic diastolic CHF, CAD s/p PCI with stent in 2002, Last cath in july 2014 with RCA disease medically managed, Hyperlipidemia, PVD, Iron deficiency anemia,Chronic back pain, Gout, BPH, CKD III with baseline Cr 2.2,  PAF not on a/c, Hx of GI Bleed in the past 10/16 had sigmoidoscopy , hemorrhoids s/p banding, family hx of htn, dm, colon ca, now presenting with low hb. Patient states he went to his pcp and was found to have low hb and presented to ER. In Er he was found to have hb of 6.3.  Found to have bun/cr of 114/3.51.     1-Acute anemia , likely secondary to blood loss anemia  -Patients denies noticing any blood in stool or urine, as well has any hemetemsis  -His baseline hb is around 8.4 , admitted with 6.3  -s/p 4 units; will order 1 more to keep hb above 8 given multiple co-morbidities  -hb 7.2 > 7.7 > 7.4 (after 4  units)  -His GI doc is Dr. Koch, consult appreciated  -no active bleeding at this time. however ongoing drop in hb. protonix infusion was re-started by GI  - no immediate plans for scoping as per gi  -send Haptoglobin r/o hemolysis  -send fecal occult    2-CAD w/ recent stent placement  -cardio on board  -recco to take off aspirin temporarily till gi bleed resolves  -will re-evaluate when hb more stable      3-Chronic copd  w/ chronic respiratory failure on home o2 and bipap nightly  -c/w night bipap and supplemental o2  -currently comfortable and not in exacerbation    4- Acute renal failure on stage III CKD possibly secondary to ATN  -baseline creatinine is 2.4 (2017)  -admited with 3.51 > 105/2.83  -likley secondary to ATN secondary to to volume loss  -nephrology services on board  -will continue to volume replenish and repeat am bmp    5- Acute back pain  -on admit pateint c/o back pain, however he did not verbalize these concerns to me. This pain is likley acute on chronic  -spinal saw him, recco MRI  -Since paient not having acute pain at this time, will defer MRI for outpatient services      6-Paroxysmal atrial fibrillation.  Plan: currently in sinus rhythm on EKG   not on AC due to hx of GI bleed.     7-Chronic diastolic congestive heart failure.   -not in exacerbation    8-DVt proph- b/l SCDS only secondary to gi bleed    9-mild hypernatremia- possible secondary to the blood tranfusion/ mild dehydration  CHIEF COMPLAINT: anemia    SUBJECTIVE: currently has no complaints    Objective: nad, axox3    REVIEW OF SYSTEMS:    CONSTITUTIONAL: No weakness, fevers or chills  EYES/ENT: No visual changes;  No vertigo or throat pain   NECK: No pain or stiffness  RESPIRATORY: No cough, wheezing, hemoptysis; No shortness of breath  CARDIOVASCULAR: No chest pain or palpitations  GASTROINTESTINAL: No abdominal or epigastric pain. No nausea, vomiting, or hematemesis; No diarrhea or constipation. No melena or hematochezia.  GENITOURINARY: No dysuria, frequency or hematuria  NEUROLOGICAL: No numbness or weakness  SKIN: No itching, burning, rashes, or lesions   All other review of systems is negative unless indicated above    Vital Signs Last 24 Hrs  T(C): 36.6, Max: 36.7 (03-20 @ 22:39)  T(F): 97.9, Max: 98.1 (03-20 @ 22:39)  HR: 95 (90 - 112)  BP: 121/53 (121/53 - 140/63)  BP(mean): --  RR: 18 (16 - 18)  SpO2: 93% (93% - 100%)      PHYSICAL EXAM:    Constitutional: NAD, awake and alert, well-developed  HEENT: PERR, EOMI, Normal Hearing, MMM  Neck: Soft and supple, No LAD, No JVD  Respiratory: Breath sounds are clear bilaterally, No wheezing, rales or rhonchi  Cardiovascular: S1 and S2, regular rate and rhythm, no Murmurs, gallops or rubs  Gastrointestinal: Bowel Sounds present, soft, nontender, nondistended, no guarding, no rebound  Extremities: no edema le b/l, chronic venous stasis skin changes, amber boot on  Vascular: 2+ peripheral pulses  Neurological: A/O x 3, no focal deficits  Musculoskeletal: 5/5 strength b/l upper and lower extremities  Skin: No rashes    MEDICATIONS:  MEDICATIONS  (STANDING):  pantoprazole Infusion 8mG/Hr IV Continuous <Continuous>  finasteride 5milliGRAM(s) Oral daily  buDESOnide   0.5 milliGRAM(s) Respule 0.5milliGRAM(s) Nebulizer two times a day  aspirin enteric coated 81milliGRAM(s) Oral daily  doxazosin 8milliGRAM(s) Oral at bedtime  isosorbide   mononitrate ER Tablet (IMDUR) 120milliGRAM(s) Oral daily  diltiazem    Tablet 120milliGRAM(s) Oral two times a day  gabapentin Oral Tab/Cap - Peds 600milliGRAM(s) Oral at bedtime  gabapentin 300milliGRAM(s) Oral daily  allopurinol 100milliGRAM(s) Oral daily  loratadine 10milliGRAM(s) Oral daily  fenofibrate Tablet 145milliGRAM(s) Oral daily  simvastatin 10milliGRAM(s) Oral at bedtime  pramipexole 0.125milliGRAM(s) Oral three times a day  metoprolol Oral Tab/Cap - Peds 12.5milliGRAM(s) Oral two times a day  famotidine    Tablet 20milliGRAM(s) Oral at bedtime  ferrous    sulfate 325milliGRAM(s) Oral three times a day with meals  hydrALAZINE 150milliGRAM(s) Oral two times a day  pantoprazole    Tablet 40milliGRAM(s) Oral two times a day before meals  ammonium lactate 12% Lotion 1Application(s) Topical two times a day  heparin  Injectable 5000Unit(s) SubCutaneous every 8 hours      LABS: All Labs Reviewed:                        6.3    8.6   )-----------( 337      ( 20 Mar 2017 22:39 )             20.0     20 Mar 2017 22:39    147    |  114    |  114    ----------------------------<  124    4.4     |  23     |  3.51     Ca    8.1        20 Mar 2017 22:39    TPro  5.7    /  Alb  2.5    /  TBili  0.2    /  DBili  x      /  AST  19     /  ALT  14     /  AlkPhos  34     20 Mar 2017 22:39    PT/INR - ( 20 Mar 2017 22:39 )   PT: 11.3 sec;   INR: 1.03 ratio         PTT - ( 20 Mar 2017 22:39 )  PTT:33.6 sec  CARDIAC MARKERS ( 21 Mar 2017 03:42 )  0.018 ng/mL / x     / 107 U/L / x     / x          Assessment and Plan:    82 year old man w/ PMH of HTN, COPD on home O2 2L, SHAYY on nocturnal BIPAP, ex-smoker (smoked 1ppd X 50 years, quit 22 years ago),  Chronic diastolic CHF, CAD s/p PCI with stent in 2002,  Last cath in july 2014 with RCA disease medically managed, Hyperlipidemia, PVD, Iron deficiency anemia,Chronic back pain, Gout, BPH, CKD III with baseline Cr 2.2,  PAF not on a/c, Hx of GI Bleed in the past, hemorrhoids s/p banding, family hx of htn, dm, colon ca, now presenting with low hb. Patient states he went to his pcp and was found to have low hb and presented to ER. In Er he was found to have hb of 6.3.  Found to have bun/cr of 114/3.51.            male with PMHx of COPD, CHF, chronic renal failure, paroxysmal A-fib.with last hospitaliztion 10/2017 for hematochezia s/p bleeding scan and flex sigmoidoscopy  was BIBA for acute worsening lower back pain radiating to buttock to foot   patient with amber boots for chronic venous stasis ulcers and lower extremity chronic edema ,daughter reports increased oozing from ulcers in lai past few weeks,no fever  In Ed patient noted to have Hb 6 and stool guaiac positive  and rectal exam- with cookie  done by ED physician as per note   Patient denies any chest pain or worsening of SOB ,no abdominal pain or vomiting ,no diarrhea,no efver or chills      Assessment:  82 y/o male with PMHx of COPD, CHF, chronic renal failure, paroxysmal A-fib.with last hospitaliztion 10/2017 for hematochezia s/p bleeding scan and flex sigmoidoscopy  was BIBA for acute worsening lower back pain radiating to buttock to foot   patient with amber boots for chronic venous stasis ulcers and lower extremity chronic edema ,daughter reports increased oozing from ulcers in lai past few weeks,no fever  In Ed patient noted to have Hb 6 and stool guaiac positive  and rectal exam- with cookie  done by ED physician as per note   Patient denies any chest pain or worsening of SOB ,no abdominal pain or vomiting ,no diarrhea,no efver or chills      Problem/Plan - 1:  ·  Problem: Anemia due to blood loss.  Plan: Anemia secondary to slow upper GI bleed with cookie   Admit to tele until anemia improves after transfusion of PRBC  protonix Iv drip started in ED  GI consult  CBC q6 hrs   prn transfusion for Hb <8  plavix was stopped by his PMD feb 2017  would continue asa 81 for now due to hx of reccent cardiac stent in sept 2017  cardio consult for antiplatelet management  DVT prophylaxis- patient high risk for VTE ,unable to tolerate IPC ,would start heparin SC and monitor anemia.     Problem/Plan - 2:  ·  Problem: Acute back pain, unspecified back location, unspecified back pain laterality.  Plan: Acute on chronic lumbar radiculopathy to right side  vs r/o vascular claudication (c/o leg and thigh pain with hx of PVD)/no cellulitis to B/L extremities   pain management  vascular surgery consult  ortho spine consult  wound care for venous stasis ulcers.     Problem/Plan - 3:  ·  Problem: Acute kidney injury superimposed on CKD.  Plan: ARYA likely secondary to volume depletion due to slow GI blood loss   transfuse PRBC as ordered  monitor BMP  nephrology consult.     Problem/Plan - 4:  ·  Problem: Paroxysmal atrial fibrillation.  Plan: currently in sinus rhythm on EKG   not on AC due to hx of GI bleed.     Problem/Plan - 5:  ·  Problem: Chronic diastolic congestive heart failure.  Plan: chronic diastolic heart failure ,no clinical decompensation   continue meds as per reconcilliation.

## 2017-03-22 NOTE — ADVANCED PRACTICE NURSE CONSULT - ASSESSMENT
This is an 82 year old male that was admitted to the hospital on 3/20/2017 for GI bleed. PMH- COPD on home O2 2L, SHAYY on nocturnal BIPAP, ex-smoker (smoked 1ppd X 50 years, quit 22 years ago),  Chronic diastolic CHF, CAD s/p PCI with stent in 2002, Last cath in july 2014 with RCA disease medically managed, Hyperlipidemia, PVD, Iron deficiency anemia, Chronic back pain, Gout, BPH, CKD III with baseline Cr 2.2,PAF not on a/c, Hx of GI Bleed in the past 10/16 had sigmoidoscopy , hemorrhoids s/p banding.    Requested by MD to assess patient's BLE wounds. Patient reports that he sees Dr. Clement weekly for his dressings to be changed. Reports that Dr. Clement uses xeroform, kerlix and ace bandages for dressings.     Upon walking into room, foul odor coming from patient. Dr. Garcia also examined patient at the same time. Reports that Dr. Clement has seen patient while in the hospital. Old dressings removed to both lower extremities. LLE with erythema and scattered ulcerations draining tan discharge with foul odor. RLE with erythema and scattered ulcerations draining tan discharge with foul odor. RLE with an ulceration to the lateral malleolus with tan tissue measuring [id number]. Dopplers used to auscultate dorsalis pedal pulses, L > R. At this time, patient would greatly benefit from a whirlpool treatment to both lower extremities to remove debris and assist with odor control. Dr. Garcia agreeable to treatment. Whirlpool to be brought up to unit to use. Then dressings to be applied to both lower extremities. Patient remains in his [employer] 9000 MRS on his backside until he whirlpool arrives.

## 2017-03-22 NOTE — ADVANCED PRACTICE NURSE CONSULT - RECOMMEDATIONS
1) Daily Whirlpool treatments with 2 drops of hibiclens  2) After whirlpool apply Xeroform, kerlix and ace bandage.   3) Elevate extremities off mattress  4) Turn and position every 2 hours

## 2017-03-22 NOTE — PROGRESS NOTE ADULT - SUBJECTIVE AND OBJECTIVE BOX
HPI:   81 y/o male with PMHx of COPD, CHF, chronic renal failure, paroxysmal A-fib.with last hospitaliztion 10/2017 for hematochezia s/p bleeding scan and flex sigmoidoscopy  was BIBA for acute worsening lower back pain radiating to buttock to foot   patient with amber boots for chronic venous stasis ulcers and lower extremity chronic edema ,daughter reports increased oozing from ulcers in lai past few weeks,no fever    Pt was evaluated by GI team and he did not have recurrence of hematochezia.  PMHx:   HTN,  COPD on home O2 2L,  SHAYY on nocturnal BIPAP,  Chronic diastolic CHF,  CAD s/p PCI with stent in 2002,  Last cath in july 2014 with RCA disease medically managed, Hyperlipidemia,  PVD,  Iron deficiency anemia,  Chronic back pain,  Gout,  BPH,  CKD III with baseline Cr 2.2,  PAF not on a/c,  Hx of GI Bleed in the past, hemorrhoids s/p banding,  PSHx: ::  Right rotator cuff repair  pilonidal cyst  Family History: ::  Father: rectal CA  Mother: HTN, DM  3 siblings: DM  Social History: ::  Tobacco: smoked 1ppd X 50 years, quit 22 years ago.  Rare ETOH use.  wife recently passed away last year (21 Mar 2017 06:35)      PAST MEDICAL & SURGICAL HISTORY:  Sepsis, due to unspecified organism: 2/2 poorly healing wounds b/l  BPH (benign prostatic hypertrophy)  Hyperlipemia  Coronary artery disease  Hypertension  Dyspepsia: On moderate exertion.  Sleep apnea, obstructive: Requires home 02 therapy, and treatment with BIPAP  Atelectasis  Pleural effusion, bilateral  Respiratory failure  Peripheral edema  CRI (chronic renal insufficiency)  Gout  CRF (chronic renal failure)  Benign prostatic hypertrophy  Spinal stenosis  Hypercholesterolemia  GERD (gastroesophageal reflux disease)  CAD (coronary artery disease)  Hypertension  S/P angioplasty with stent  Cataract of left eye  Prostate: Surgery green light procedure.  S/P rotator cuff surgery: Right  S/P angioplasty  Rotator cuff tear, right: repair      MEDICATIONS  (STANDING):  pantoprazole Infusion 8mG/Hr IV Continuous <Continuous>  finasteride 5milliGRAM(s) Oral daily  buDESOnide   0.5 milliGRAM(s) Respule 0.5milliGRAM(s) Nebulizer two times a day  aspirin enteric coated 81milliGRAM(s) Oral daily  doxazosin 8milliGRAM(s) Oral at bedtime  isosorbide   mononitrate ER Tablet (IMDUR) 120milliGRAM(s) Oral daily  diltiazem    Tablet 120milliGRAM(s) Oral two times a day  gabapentin Oral Tab/Cap - Peds 600milliGRAM(s) Oral at bedtime  gabapentin 300milliGRAM(s) Oral daily  allopurinol 100milliGRAM(s) Oral daily  loratadine 10milliGRAM(s) Oral daily  fenofibrate Tablet 145milliGRAM(s) Oral daily  simvastatin 10milliGRAM(s) Oral at bedtime  pramipexole 0.125milliGRAM(s) Oral three times a day  metoprolol Oral Tab/Cap - Peds 12.5milliGRAM(s) Oral two times a day  famotidine    Tablet 20milliGRAM(s) Oral at bedtime  ferrous    sulfate 325milliGRAM(s) Oral three times a day with meals  hydrALAZINE 150milliGRAM(s) Oral two times a day  pantoprazole    Tablet 40milliGRAM(s) Oral two times a day before meals  ammonium lactate 12% Lotion 1Application(s) Topical two times a day  heparin  Injectable 5000Unit(s) SubCutaneous every 8 hours    MEDICATIONS  (PRN):  nitroglycerin     SubLingual 0.4milliGRAM(s) SubLingual every 5 minutes PRN Chest Pain      Allergies    No Known Allergies    Intolerances        SOCIAL HISTORY: Denies tobacco, etoh abuse or illicit drug use    FAMILY HISTORY:  No pertinent family history in first degree relatives      Vital Signs Last 24 Hrs  T(C): 36.6, Max: 36.7 (03-20 @ 22:39)  T(F): 97.9, Max: 98.1 (03-20 @ 22:39)  HR: 95 (90 - 112)  BP: 121/53 (121/53 - 140/63)  BP(mean): --  RR: 18 (16 - 18)  SpO2: 93% (93% - 100%)    REVIEW OF SYSTEMS:    CONSTITUTIONAL:  As per HPI.  HEENT:  Eyes:  No diplopia or blurred vision. ENT:  No earache, sore throat or runny nose.  CARDIOVASCULAR:  No pressure, squeezing, strangling, tightness, heaviness or aching about the chest, neck, axilla or epigastrium.  RESPIRATORY:  No cough, shortness of breath, PND or orthopnea.  GASTROINTESTINAL:  No nausea, vomiting or diarrhea.  GENITOURINARY:  No dysuria, frequency or urgency.  MUSCULOSKELETAL:  As per HPI.  SKIN:  No change in skin, hair or nails.  NEUROLOGIC:  No paresthesias, fasciculations, seizures or weakness.  PSYCHIATRIC:  No disorder of thought or mood.  ENDOCRINE:  No heat or cold intolerance, polyuria or polydipsia.  HEMATOLOGICAL:  No easy bruising or bleedings:  .     PHYSICAL EXAMINATION:    GENERAL APPEARANCE:  Pt. is not currently dyspneic, in no distress. Pt. is alert, oriented, and pleasant.  HEENT:  Pupils are normal and react normally. No icterus. Mucous membranes well colored.  NECK:  Supple. No lymphadenopathy. Jugular venous pressure not elevated. Carotids equal.   HEART:   The cardiac impulse has a normal quality. There are no murmurs, rubs or gallops noted  CHEST:  Chest is clear to auscultation. Normal respiratory effort.  ABDOMEN:  Soft and nontender.   EXTREMITIES:  There is no edema.   SKIN:  No rash or significant lesions are noted.    I&O's Summary      LABS:                        6.3    8.6   )-----------( 337      ( 20 Mar 2017 22:39 )             20.0   20 Mar 2017 22:39    147    |  114    |  114    ----------------------------<  124    4.4     |  23     |  3.51     Ca    8.1        20 Mar 2017 22:39    TPro  5.7    /  Alb  2.5    /  TBili  0.2    /  DBili  x      /  AST  19     /  ALT  14     /  AlkPhos  34     20 Mar 2017 22:39  LIVER FUNCTIONS - ( 20 Mar 2017 22:39 )  Alb: 2.5 g/dL / Pro: 5.7 gm/dL / ALK PHOS: 34 U/L / ALT: 14 U/L / AST: 19 U/L / GGT: x           PT/INR - ( 20 Mar 2017 22:39 )   PT: 11.3 sec;   INR: 1.03 ratio         PTT - ( 20 Mar 2017 22:39 )  PTT:33.6 secCARDIAC MARKERS ( 21 Mar 2017 03:42 )  0.018 ng/mL / x     / 107 U/L / x     / x            TELEMETRY:    Normal sinus rhythm with no tachy or nelly events

## 2017-03-23 ENCOUNTER — RESULT REVIEW (OUTPATIENT)
Age: 82
End: 2017-03-23

## 2017-03-23 LAB
ANION GAP SERPL CALC-SCNC: 10 MMOL/L — SIGNIFICANT CHANGE UP (ref 5–17)
BASE EXCESS BLDA CALC-SCNC: -3 MMOL/L — LOW (ref -2–2)
BLOOD GAS COMMENTS ARTERIAL: SIGNIFICANT CHANGE UP
BUN SERPL-MCNC: 107 MG/DL — HIGH (ref 7–23)
CALCIUM SERPL-MCNC: 7.8 MG/DL — LOW (ref 8.5–10.1)
CHLORIDE SERPL-SCNC: 118 MMOL/L — HIGH (ref 96–108)
CO2 SERPL-SCNC: 23 MMOL/L — SIGNIFICANT CHANGE UP (ref 22–31)
CREAT SERPL-MCNC: 2.5 MG/DL — HIGH (ref 0.5–1.3)
FERRITIN SERPL-MCNC: 57.8 NG/ML — SIGNIFICANT CHANGE UP (ref 30–400)
GAS PNL BLDA: SIGNIFICANT CHANGE UP
GLUCOSE SERPL-MCNC: 113 MG/DL — HIGH (ref 70–99)
HCO3 BLDA-SCNC: 21 MMOL/L — SIGNIFICANT CHANGE UP (ref 21–29)
HCT VFR BLD CALC: 25.2 % — LOW (ref 39–50)
HCT VFR BLD CALC: 25.4 % — LOW (ref 39–50)
HCT VFR BLD CALC: 25.7 % — LOW (ref 39–50)
HGB BLD-MCNC: 8 G/DL — LOW (ref 13–17)
HGB BLD-MCNC: 8.1 G/DL — LOW (ref 13–17)
HGB BLD-MCNC: 8.2 G/DL — LOW (ref 13–17)
IRON SATN MFR SERPL: 14 % — LOW (ref 16–55)
IRON SATN MFR SERPL: 50 UG/DL — SIGNIFICANT CHANGE UP (ref 45–165)
MCHC RBC-ENTMCNC: 30.2 PG — SIGNIFICANT CHANGE UP (ref 27–34)
MCHC RBC-ENTMCNC: 31.4 GM/DL — LOW (ref 32–36)
MCV RBC AUTO: 96.1 FL — SIGNIFICANT CHANGE UP (ref 80–100)
PCO2 BLDA: 35 MMHG — SIGNIFICANT CHANGE UP (ref 32–46)
PH BLDA: 7.4 — SIGNIFICANT CHANGE UP (ref 7.35–7.45)
PLATELET # BLD AUTO: 272 K/UL — SIGNIFICANT CHANGE UP (ref 150–400)
PO2 BLDA: 109 — SIGNIFICANT CHANGE UP
POTASSIUM SERPL-MCNC: 4.5 MMOL/L — SIGNIFICANT CHANGE UP (ref 3.5–5.3)
POTASSIUM SERPL-SCNC: 4.5 MMOL/L — SIGNIFICANT CHANGE UP (ref 3.5–5.3)
RBC # BLD: 2.59 M/UL — LOW (ref 4.2–5.8)
RBC # BLD: 2.67 M/UL — LOW (ref 4.2–5.8)
RBC # FLD: 21.1 % — HIGH (ref 10.3–14.5)
RETICS #: 222.7 K/UL — HIGH (ref 25–125)
RETICS/RBC NFR: 8.6 % — HIGH (ref 0.5–2.5)
SAO2 % BLDA: 99 — SIGNIFICANT CHANGE UP
SODIUM SERPL-SCNC: 151 MMOL/L — HIGH (ref 135–145)
TIBC SERPL-MCNC: 351 UG/DL — SIGNIFICANT CHANGE UP (ref 220–430)
UIBC SERPL-MCNC: 301 UG/DL — SIGNIFICANT CHANGE UP (ref 110–370)
WBC # BLD: 9.8 K/UL — SIGNIFICANT CHANGE UP (ref 3.8–10.5)
WBC # FLD AUTO: 9.8 K/UL — SIGNIFICANT CHANGE UP (ref 3.8–10.5)

## 2017-03-23 PROCEDURE — 71010: CPT | Mod: 26

## 2017-03-23 RX ORDER — ONDANSETRON 8 MG/1
4 TABLET, FILM COATED ORAL EVERY 6 HOURS
Qty: 0 | Refills: 0 | Status: DISCONTINUED | OUTPATIENT
Start: 2017-03-23 | End: 2017-04-05

## 2017-03-23 RX ORDER — PANTOPRAZOLE SODIUM 20 MG/1
40 TABLET, DELAYED RELEASE ORAL EVERY 12 HOURS
Qty: 0 | Refills: 0 | Status: DISCONTINUED | OUTPATIENT
Start: 2017-03-23 | End: 2017-03-24

## 2017-03-23 RX ORDER — SOD SULF/SODIUM/NAHCO3/KCL/PEG
4000 SOLUTION, RECONSTITUTED, ORAL ORAL ONCE
Qty: 0 | Refills: 0 | Status: COMPLETED | OUTPATIENT
Start: 2017-03-23 | End: 2017-03-23

## 2017-03-23 RX ADMIN — Medication 1 APPLICATION(S): at 07:20

## 2017-03-23 RX ADMIN — Medication 8 MILLIGRAM(S): at 23:10

## 2017-03-23 RX ADMIN — PANTOPRAZOLE SODIUM 40 MILLIGRAM(S): 20 TABLET, DELAYED RELEASE ORAL at 18:13

## 2017-03-23 RX ADMIN — Medication 4000 MILLILITER(S): at 16:19

## 2017-03-23 RX ADMIN — Medication 12.5 MILLIGRAM(S): at 18:13

## 2017-03-23 RX ADMIN — Medication 150 MILLIGRAM(S): at 07:17

## 2017-03-23 RX ADMIN — PANTOPRAZOLE SODIUM 10 MG/HR: 20 TABLET, DELAYED RELEASE ORAL at 07:13

## 2017-03-23 RX ADMIN — Medication 100 MILLIGRAM(S): at 12:15

## 2017-03-23 RX ADMIN — FINASTERIDE 5 MILLIGRAM(S): 5 TABLET, FILM COATED ORAL at 12:16

## 2017-03-23 RX ADMIN — Medication 325 MILLIGRAM(S): at 09:15

## 2017-03-23 RX ADMIN — Medication 12.5 MILLIGRAM(S): at 07:18

## 2017-03-23 RX ADMIN — SIMVASTATIN 10 MILLIGRAM(S): 20 TABLET, FILM COATED ORAL at 23:12

## 2017-03-23 RX ADMIN — Medication 650 MILLIGRAM(S): at 23:12

## 2017-03-23 RX ADMIN — PRAMIPEXOLE DIHYDROCHLORIDE 0.12 MILLIGRAM(S): 0.12 TABLET ORAL at 16:21

## 2017-03-23 RX ADMIN — Medication 145 MILLIGRAM(S): at 12:15

## 2017-03-23 RX ADMIN — Medication 150 MILLIGRAM(S): at 18:13

## 2017-03-23 RX ADMIN — Medication 40 MILLIGRAM(S): at 07:14

## 2017-03-23 RX ADMIN — Medication 1 APPLICATION(S): at 18:15

## 2017-03-23 RX ADMIN — Medication 650 MILLIGRAM(S): at 18:14

## 2017-03-23 RX ADMIN — PRAMIPEXOLE DIHYDROCHLORIDE 0.12 MILLIGRAM(S): 0.12 TABLET ORAL at 07:19

## 2017-03-23 RX ADMIN — LORATADINE 10 MILLIGRAM(S): 10 TABLET ORAL at 12:15

## 2017-03-23 RX ADMIN — ISOSORBIDE MONONITRATE 120 MILLIGRAM(S): 60 TABLET, EXTENDED RELEASE ORAL at 12:15

## 2017-03-23 RX ADMIN — Medication 240 MILLIGRAM(S): at 07:14

## 2017-03-23 RX ADMIN — PRAMIPEXOLE DIHYDROCHLORIDE 0.12 MILLIGRAM(S): 0.12 TABLET ORAL at 23:10

## 2017-03-23 RX ADMIN — Medication 0.5 MILLIGRAM(S): at 07:28

## 2017-03-23 RX ADMIN — Medication 650 MILLIGRAM(S): at 07:12

## 2017-03-23 RX ADMIN — Medication 650 MILLIGRAM(S): at 12:15

## 2017-03-23 RX ADMIN — GABAPENTIN 600 MILLIGRAM(S): 400 CAPSULE ORAL at 23:10

## 2017-03-23 NOTE — PROGRESS NOTE ADULT - SUBJECTIVE AND OBJECTIVE BOX
NEPHROLOGY INTERVAL HPI/OVERNIGHT EVENTS:    HPI:  HPI: :: 82 y/o male with PMHx of COPD, CHF, chronic renal failure, paroxysmal A-fib.with last hospitalization 10/2017 for hematochezia s/p bleeding scan and flex sigmoidoscopy  was BIBA for acute worsening lower back pain radiating to buttock to foot   patient with amber boots for chronic venous stasis ulcers and lower extremity chronic edema ,daughter reports increased oozing from ulcers in the past few weeks, no fever  In Ed patient noted to have Hb 6 and stool guaiac positive  and rectal exam- with cookie  done by ED physician as per note   Patient denies any chest pain or worsening of SOB ,no abdominal pain or vomiting ,no diarrhea, no fever or chills    3/23  feels better  for endoscopy and colonoscopy  had a large BM this am  less muscle jerking, gabapentin was reduced          PMHx: ::  HTN,  COPD on home O2 2L,  SHAYY on nocturnal BIPAP,  Chronic diastolic CHF,  CAD s/p PCI with stent in ,  Last cath in 2014 with RCA disease medically managed, Hyperlipidemia,  PVD,  Iron deficiency anemia,  Chronic back pain,  Gout,  BPH,  CKD III with baseline Cr 2.2,  PAF not on a/c,  Hx of GI Bleed in the past, hemorrhoids s/p banding,    PSHx: ::  Right rotator cuff repair  pilonidal cyst  Family History: ::  Father: rectal CA  Mother: HTN, DM  3 siblings: DM  Social History: ::  Tobacco: smoked 1ppd X 50 years, quit 22 years ago.  Rare ETOH use.  wife recently passed away last year (21 Mar 2017 06:35)      PAST MEDICAL & SURGICAL HISTORY:  Sepsis, due to unspecified organism: 2/2 poorly healing wounds b/l  BPH (benign prostatic hypertrophy)  Hyperlipemia  Coronary artery disease  Hypertension  Dyspepsia: On moderate exertion.  Sleep apnea, obstructive: Requires home 02 therapy, and treatment with BIPAP  Atelectasis  Pleural effusion, bilateral  Respiratory failure  Peripheral edema  CRI (chronic renal insufficiency)  Gout  CRF (chronic renal failure)  Benign prostatic hypertrophy  Spinal stenosis  Hypercholesterolemia  GERD (gastroesophageal reflux disease)  CAD (coronary artery disease)  Hypertension  S/P angioplasty with stent  Cataract of left eye  Prostate: Surgery green light procedure.  S/P rotator cuff surgery: Right  S/P angioplasty  Rotator cuff tear, right: repair      FAMILY HISTORY:  No pertinent family history in first degree relatives      MEDICATIONS  (STANDING):  finasteride 5milliGRAM(s) Oral daily  buDESOnide   0.5 milliGRAM(s) Respule 0.5milliGRAM(s) Nebulizer two times a day  doxazosin 8milliGRAM(s) Oral at bedtime  isosorbide   mononitrate ER Tablet (IMDUR) 120milliGRAM(s) Oral daily  gabapentin Oral Tab/Cap - Peds 600milliGRAM(s) Oral at bedtime  allopurinol 100milliGRAM(s) Oral daily  loratadine 10milliGRAM(s) Oral daily  fenofibrate Tablet 145milliGRAM(s) Oral daily  simvastatin 10milliGRAM(s) Oral at bedtime  pramipexole 0.125milliGRAM(s) Oral three times a day  metoprolol Oral Tab/Cap - Peds 12.5milliGRAM(s) Oral two times a day  hydrALAZINE 150milliGRAM(s) Oral two times a day  ammonium lactate 12% Lotion 1Application(s) Topical two times a day  diltiazem   CD 240milliGRAM(s) Oral daily  senna 2Tablet(s) Oral at bedtime  glycerin Suppository - Adult 1Suppository(s) Rectal daily  furosemide    Tablet 40milliGRAM(s) Oral daily  acetaminophen   Tablet 650milliGRAM(s) Oral every 6 hours  pantoprazole  Injectable 40milliGRAM(s) IV Push every 12 hours  polyethylene glycol/electrolyte Solution. 4000milliLiter(s) Oral once    MEDICATIONS  (PRN):  nitroglycerin     SubLingual 0.4milliGRAM(s) SubLingual every 5 minutes PRN Chest Pain  ondansetron Injectable 4milliGRAM(s) IV Push every 6 hours PRN Nausea and/or Vomiting      Allergies    No Known Allergies    Intolerances        I&O's Summary  I & Os for 24h ending 23 Mar 2017 07:00  =============================================  IN: 305 ml / OUT: 0 ml / NET: 305 ml    I & Os for current day (as of 23 Mar 2017 09:57)  =============================================  IN: 0 ml / OUT: 500 ml / NET: -500 ml        REVIEW OF SYSTEMS:    CONSTITUTIONAL:  As per HPI.    HEENT:  Eyes:  No diplopia or blurred vision. ENT:  No earache, sore throat or runny nose.    CARDIOVASCULAR:  No pressure, squeezing, strangling, tightness, heaviness or aching about the chest, neck, axilla or epigastrium.    RESPIRATORY:  No cough, shortness of breath, PND or orthopnea.    GASTROINTESTINAL:  No nausea, vomiting or diarrhea.    GENITOURINARY:  No dysuria, frequency or urgency.    MUSCULOSKELETAL:  As per HPI.    SKIN:  No change in skin, hair or nails.    NEUROLOGIC:  No paresthesias, fasciculations, seizures or weakness.    PSYCHIATRIC:  No disorder of thought or mood.    ENDOCRINE:  No heat or cold intolerance, polyuria or polydipsia.    HEMATOLOGICAL:  No easy bruising or bleeding.      Vital Signs Last 24 Hrs  T(C): 37, Max: 37.5 (- @ 23:43)  T(F): 98.6, Max: 99.5 (03-22 @ 23:43)  HR: 74 (66 - 96)  BP: 177/45 (135/40 - 178/45)  BP(mean): --  RR: 18 (16 - 18)  SpO2: 96% (95% - 99%)  Daily     Daily Weight in k.5 (23 Mar 2017 09:50)    PHYSICAL EXAM:    General:  Alert, well-developed ,No acute distress.    Neuro:  Alert and oriented to person, place, and time. Able to communicate  well. Cranial nerves 2-12 grossly intact. 5/5 strength in all  extremities bilaterally. Sensation intact in all extremities.  Appropriate affect.     HEENT:  No JVD, no masses, Eyes anicteric, No carotid bruits.No lymphadenopathy,    Cardiovascular:  Regular rate and rhythm, with normal S1 and S2. No murmurs, rubs,  or gallops. No JVD.    Lungs:  Lungs clear. no rales, no wheezing, .    Abdomen:  Normoactive bowel sounds. Soft, flat, non-tender, and non-distended.  No hepatosplenomegaly, positive bowel sounds    Skin:  Warm, dry, well-perfused. No rashes or other lesions.     Extremities:  legs wrapped, less edema, less odorous, less weeping      LABS:                        8.2    x     )-----------( x        ( 23 Mar 2017 07:54 )             25.4     23 Mar 2017 05:00    151    |  118    |  107    ----------------------------<  113    4.5     |  23     |  2.50     Ca    7.8        23 Mar 2017 05:00

## 2017-03-23 NOTE — PROGRESS NOTE ADULT - SUBJECTIVE AND OBJECTIVE BOX
CHIEF COMPLAINT: low hb    SUBJECTIVE: no complaints    Objective: nad, axox3    REVIEW OF SYSTEMS:    CONSTITUTIONAL: No weakness, fevers or chills  EYES/ENT: No visual changes;  No vertigo or throat pain   NECK: No pain or stiffness  RESPIRATORY: No cough, wheezing, hemoptysis; No shortness of breath  CARDIOVASCULAR: No chest pain or palpitations  GASTROINTESTINAL: No abdominal or epigastric pain. No nausea, vomiting, or hematemesis; No diarrhea or constipation. No melena or hematochezia.  GENITOURINARY: No dysuria, frequency or hematuria  NEUROLOGICAL: No numbness or weakness  SKIN: No itching, burning, rashes, or lesions   All other review of systems is negative unless indicated above    Vital Signs Last 24 Hrs  T(C): 36.9, Max: 37.5 (03-22 @ 23:43)  T(F): 98.4, Max: 99.5 (03-22 @ 23:43)  HR: 86 (66 - 96)  BP: 177/45 (135/40 - 178/45)  BP(mean): --  RR: 18 (16 - 18)  SpO2: 99% (95% - 99%)    I&O's Summary  I & Os for 24h ending 23 Mar 2017 07:00  =============================================  IN: 305 ml / OUT: 0 ml / NET: 305 ml    I & Os for current day (as of 23 Mar 2017 16:41)  =============================================  IN: 0 ml / OUT: 500 ml / NET: -500 ml          PHYSICAL EXAM:    Constitutional: NAD, awake and alert, well-developed  HEENT: PERR, EOMI, Normal Hearing, MMM  Neck: Soft and supple, No LAD, No JVD  Respiratory: Breath sounds are clear bilaterally, No wheezing, rales or rhonchi  Cardiovascular: S1 and S2, regular rate and rhythm, no Murmurs, gallops or rubs  Gastrointestinal: Bowel Sounds present, soft, nontender, nondistended, no guarding, no rebound  Extremities: No peripheral edema  Vascular: 2+ peripheral pulses  Neurological: A/O x 3, no focal deficits  Musculoskeletal: 5/5 strength b/l upper and lower extremities  Skin: No rashes    MEDICATIONS:  MEDICATIONS  (STANDING):  finasteride 5milliGRAM(s) Oral daily  buDESOnide   0.5 milliGRAM(s) Respule 0.5milliGRAM(s) Nebulizer two times a day  doxazosin 8milliGRAM(s) Oral at bedtime  isosorbide   mononitrate ER Tablet (IMDUR) 120milliGRAM(s) Oral daily  gabapentin Oral Tab/Cap - Peds 600milliGRAM(s) Oral at bedtime  allopurinol 100milliGRAM(s) Oral daily  loratadine 10milliGRAM(s) Oral daily  fenofibrate Tablet 145milliGRAM(s) Oral daily  simvastatin 10milliGRAM(s) Oral at bedtime  pramipexole 0.125milliGRAM(s) Oral three times a day  metoprolol Oral Tab/Cap - Peds 12.5milliGRAM(s) Oral two times a day  hydrALAZINE 150milliGRAM(s) Oral two times a day  ammonium lactate 12% Lotion 1Application(s) Topical two times a day  diltiazem   CD 240milliGRAM(s) Oral daily  senna 2Tablet(s) Oral at bedtime  glycerin Suppository - Adult 1Suppository(s) Rectal daily  furosemide    Tablet 40milliGRAM(s) Oral daily  acetaminophen   Tablet 650milliGRAM(s) Oral every 6 hours  pantoprazole  Injectable 40milliGRAM(s) IV Push every 12 hours      LABS: All Labs Reviewed:                        8.2    x     )-----------( x        ( 23 Mar 2017 07:54 )             25.4     23 Mar 2017 05:00    151    |  118    |  107    ----------------------------<  113    4.5     |  23     |  2.50     Ca    7.8        23 Mar 2017 05:00        CARDIAC MARKERS ( 21 Mar 2017 17:23 )  0.028 ng/mL / x     / 196 U/L / x     / x          Blood Culture: 03-21 @ 03:41  Organism --  Gram Stain Blood -- Gram Stain --  Specimen Source .Blood Blood  Culture-Blood -- negative    03-20 @ 22:38  Organism --  Gram Stain Blood -- Gram Stain --  Specimen Source .Blood None  Culture-Blood --        RADIOLOGY/EKG:    DVT PPX:    ADVANCED DIRECTIVE:    DISPOSITION: CHIEF COMPLAINT: low hb    SUBJECTIVE: no complaints    Objective: nad, axox3    REVIEW OF SYSTEMS:    CONSTITUTIONAL: No weakness, fevers or chills  EYES/ENT: No visual changes;  No vertigo or throat pain   NECK: No pain or stiffness  RESPIRATORY: No cough, wheezing, hemoptysis; No shortness of breath  CARDIOVASCULAR: No chest pain or palpitations  GASTROINTESTINAL: No abdominal or epigastric pain. No nausea, vomiting, or hematemesis; No diarrhea or constipation. No melena or hematochezia.  GENITOURINARY: No dysuria, frequency or hematuria  NEUROLOGICAL: No numbness or weakness  SKIN: No itching, burning, rashes, or lesions   All other review of systems is negative unless indicated above    Vital Signs Last 24 Hrs  T(C): 36.9, Max: 37.5 (03-22 @ 23:43)  T(F): 98.4, Max: 99.5 (03-22 @ 23:43)  HR: 86 (66 - 96)  BP: 177/45 (135/40 - 178/45)  BP(mean): --  RR: 18 (16 - 18)  SpO2: 99% (95% - 99%)    I&O's Summary  I & Os for 24h ending 23 Mar 2017 07:00  =============================================  IN: 305 ml / OUT: 0 ml / NET: 305 ml    I & Os for current day (as of 23 Mar 2017 16:41)  =============================================  IN: 0 ml / OUT: 500 ml / NET: -500 ml          PHYSICAL EXAM:    Constitutional: NAD, awake and alert, well-developed  HEENT: PERR, EOMI, Normal Hearing, MMM  Neck: Soft and supple, No LAD, No JVD  Respiratory: Breath sounds are clear bilaterally, No wheezing, rales or rhonchi  Cardiovascular: S1 and S2, regular rate and rhythm, no Murmurs, gallops or rubs  Gastrointestinal: Bowel Sounds present, soft, nontender, nondistended, no guarding, no rebound  Extremities: No peripheral edema  Vascular: 2+ peripheral pulses  Neurological: A/O x 3, no focal deficits  Musculoskeletal: 5/5 strength b/l upper and lower extremities  Skin: No rashes    MEDICATIONS:  MEDICATIONS  (STANDING):  finasteride 5milliGRAM(s) Oral daily  buDESOnide   0.5 milliGRAM(s) Respule 0.5milliGRAM(s) Nebulizer two times a day  doxazosin 8milliGRAM(s) Oral at bedtime  isosorbide   mononitrate ER Tablet (IMDUR) 120milliGRAM(s) Oral daily  gabapentin Oral Tab/Cap - Peds 600milliGRAM(s) Oral at bedtime  allopurinol 100milliGRAM(s) Oral daily  loratadine 10milliGRAM(s) Oral daily  fenofibrate Tablet 145milliGRAM(s) Oral daily  simvastatin 10milliGRAM(s) Oral at bedtime  pramipexole 0.125milliGRAM(s) Oral three times a day  metoprolol Oral Tab/Cap - Peds 12.5milliGRAM(s) Oral two times a day  hydrALAZINE 150milliGRAM(s) Oral two times a day  ammonium lactate 12% Lotion 1Application(s) Topical two times a day  diltiazem   CD 240milliGRAM(s) Oral daily  senna 2Tablet(s) Oral at bedtime  glycerin Suppository - Adult 1Suppository(s) Rectal daily  furosemide    Tablet 40milliGRAM(s) Oral daily  acetaminophen   Tablet 650milliGRAM(s) Oral every 6 hours  pantoprazole  Injectable 40milliGRAM(s) IV Push every 12 hours      LABS: All Labs Reviewed:                        8.2    x     )-----------( x        ( 23 Mar 2017 07:54 )             25.4     23 Mar 2017 05:00    151    |  118    |  107    ----------------------------<  113    4.5     |  23     |  2.50     Ca    7.8        23 Mar 2017 05:00        CARDIAC MARKERS ( 21 Mar 2017 17:23 )  0.028 ng/mL / x     / 196 U/L / x     / x          Blood Culture: 03-21 @ 03:41  Organism --  Gram Stain Blood -- Gram Stain --  Specimen Source .Blood Blood  Culture-Blood -- negative    03-20 @ 22:38  Organism --  Gram Stain Blood -- Gram Stain --  Specimen Source .Blood None  Culture-Blood --        RADIOLOGY/EKG:    DVT PPX:    ADVANCED DIRECTIVE:    DISPOSITION:    82 year old man w/ PMH of HTN, COPD on home O2 2L, SHAYY on nocturnal BIPAP, ex-smoker (smoked 1ppd X 50 years, quit 22 years ago),  Chronic diastolic CHF, CAD s/p PCI with stent in 2002, Last cath in july 2014 with RCA disease medically managed, Hyperlipidemia, PVD, Iron deficiency anemia,Chronic back pain, Gout, BPH, CKD III with baseline Cr 2.2,  PAF not on a/c, Hx of GI Bleed in the past 10/16 had sigmoidoscopy , hemorrhoids s/p banding, family hx of htn, dm, colon ca, now presenting with low hb. Patient states he went to his pcp and was found to have low hb and presented to ER. In Er he was found to have hb of 6.3.  Found to have bun/cr of 114/3.51.     1-Acute anemia , likely secondary to blood loss anemia  -Patients denies noticing any blood in stool or urine, as well has any hemetemsis  -His baseline hb is around 8.4 , admitted with 6.3  -s/p 4 units; will order 1 more to keep hb above 8 given multiple co-morbidities  -hb 7.2 > 7.7 > 7.4 (after 4  units) >7.9 >8.1> 8.4 (after 5 units)  -His GI doc is Dr. Koch, consult appreciated  -no active bleeding at this time. however ongoing drop in hb. Dr. Kcoh will take patient for EGD /COlo in AM  - Haptoglobin is negative for hemolysis  -send fecal occult  -Reticulocytes : 8.6, unliikely to be bone marrow related, however, if scope does not reveal any source of bleeding, then consider heme/onc consultation    2-CAD w/ recent stent placement  -cardio on board  -recco to take off aspirin temporarily till gi bleed resolves  -will re-evaluate when hb more stable      3-Chronic copd  w/ chronic respiratory failure on home o2 and bipap nightly  -c/w night bipap and supplemental o2  -currently comfortable and not in exacerbation    4- Acute renal failure on stage III CKD possibly secondary to ATN  -baseline creatinine is 2.4 (2017)  -admited with 3.51 > 105/2.83 > 107/ 2.5  -likley secondary to ATN secondary to to volume loss  -nephrology services on board  -will continue to volume replenish and repeat am bmp    5- Acute back pain  -on admit pateint c/o back pain, however he did not verbalize these concerns to me. This pain is likley acute on chronic  -spinal saw him, recco MRI  -mri reveals chronic lumbar changes. recco conservative managment.      6-Paroxysmal atrial fibrillation.  Plan: currently in sinus rhythm on EKG   not on AC due to hx of GI bleed.     7-Chronic diastolic congestive heart failure.   -not in exacerbation    8-DVt proph- b/l SCDS only secondary to gi bleed    9-mild hypernatremia- possible secondary to the blood tranfusion/ mild dehydration  -promote oral fluid intake. CHIEF COMPLAINT: low hb    SUBJECTIVE: no complaints    Objective: nad, axox3    REVIEW OF SYSTEMS:    CONSTITUTIONAL: No weakness, fevers or chills  EYES/ENT: No visual changes;  No vertigo or throat pain   NECK: No pain or stiffness  RESPIRATORY: No cough, wheezing, hemoptysis; No shortness of breath  CARDIOVASCULAR: No chest pain or palpitations  GASTROINTESTINAL: No abdominal or epigastric pain. No nausea, vomiting, or hematemesis; No diarrhea or constipation. No melena or hematochezia.  GENITOURINARY: No dysuria, frequency or hematuria  NEUROLOGICAL: No numbness or weakness  SKIN: No itching, burning, rashes, or lesions   All other review of systems is negative unless indicated above    Vital Signs Last 24 Hrs  T(C): 36.9, Max: 37.5 (03-22 @ 23:43)  T(F): 98.4, Max: 99.5 (03-22 @ 23:43)  HR: 86 (66 - 96)  BP: 177/45 (135/40 - 178/45)  BP(mean): --  RR: 18 (16 - 18)  SpO2: 99% (95% - 99%)    I&O's Summary  I & Os for 24h ending 23 Mar 2017 07:00  =============================================  IN: 305 ml / OUT: 0 ml / NET: 305 ml    I & Os for current day (as of 23 Mar 2017 16:41)  =============================================  IN: 0 ml / OUT: 500 ml / NET: -500 ml          PHYSICAL EXAM:    Constitutional: NAD, awake and alert, well-developed  HEENT: PERR, EOMI, Normal Hearing, MMM  Neck: Soft and supple, No LAD, No JVD  Respiratory: Breath sounds are clear bilaterally, No wheezing, rales or rhonchi  Cardiovascular: S1 and S2, regular rate and rhythm, no Murmurs, gallops or rubs  Gastrointestinal: Bowel Sounds present, soft, nontender, nondistended, no guarding, no rebound  Extremities: No peripheral edema  Vascular: 2+ peripheral pulses  Neurological: A/O x 3, no focal deficits  Musculoskeletal: 5/5 strength b/l upper and lower extremities  Skin: No rashes    MEDICATIONS:  MEDICATIONS  (STANDING):  finasteride 5milliGRAM(s) Oral daily  buDESOnide   0.5 milliGRAM(s) Respule 0.5milliGRAM(s) Nebulizer two times a day  doxazosin 8milliGRAM(s) Oral at bedtime  isosorbide   mononitrate ER Tablet (IMDUR) 120milliGRAM(s) Oral daily  gabapentin Oral Tab/Cap - Peds 600milliGRAM(s) Oral at bedtime  allopurinol 100milliGRAM(s) Oral daily  loratadine 10milliGRAM(s) Oral daily  fenofibrate Tablet 145milliGRAM(s) Oral daily  simvastatin 10milliGRAM(s) Oral at bedtime  pramipexole 0.125milliGRAM(s) Oral three times a day  metoprolol Oral Tab/Cap - Peds 12.5milliGRAM(s) Oral two times a day  hydrALAZINE 150milliGRAM(s) Oral two times a day  ammonium lactate 12% Lotion 1Application(s) Topical two times a day  diltiazem   CD 240milliGRAM(s) Oral daily  senna 2Tablet(s) Oral at bedtime  glycerin Suppository - Adult 1Suppository(s) Rectal daily  furosemide    Tablet 40milliGRAM(s) Oral daily  acetaminophen   Tablet 650milliGRAM(s) Oral every 6 hours  pantoprazole  Injectable 40milliGRAM(s) IV Push every 12 hours      LABS: All Labs Reviewed:                        8.2    x     )-----------( x        ( 23 Mar 2017 07:54 )             25.4     23 Mar 2017 05:00    151    |  118    |  107    ----------------------------<  113    4.5     |  23     |  2.50     Ca    7.8        23 Mar 2017 05:00        CARDIAC MARKERS ( 21 Mar 2017 17:23 )  0.028 ng/mL / x     / 196 U/L / x     / x          Blood Culture: 03-21 @ 03:41  Organism --  Gram Stain Blood -- Gram Stain --  Specimen Source .Blood Blood  Culture-Blood -- negative    03-20 @ 22:38  Organism --  Gram Stain Blood -- Gram Stain --  Specimen Source .Blood None  Culture-Blood --          82 year old man w/ PMH of HTN, COPD on home O2 2L, SHAYY on nocturnal BIPAP, ex-smoker (smoked 1ppd X 50 years, quit 22 years ago),  Chronic diastolic CHF, CAD s/p PCI with stent in 2002, Last cath in july 2014 with RCA disease medically managed, Hyperlipidemia, PVD, Iron deficiency anemia,Chronic back pain, Gout, BPH, CKD III with baseline Cr 2.2,  PAF not on a/c, Hx of GI Bleed in the past 10/16 had sigmoidoscopy , hemorrhoids s/p banding, family hx of htn, dm, colon ca, now presenting with low hb. Patient states he went to his pcp and was found to have low hb and presented to ER. In Er he was found to have hb of 6.3.  Found to have bun/cr of 114/3.51.     1-Acute anemia , likely secondary to blood loss anemia  -Patients denies noticing any blood in stool or urine, as well has any hemetemsis  -His baseline hb is around 8.4 , admitted with 6.3  -s/p 4 units; will order 1 more to keep hb above 8 given multiple co-morbidities  -hb 7.2 > 7.7 > 7.4 (after 4  units) >7.9 >8.1> 8.4 (after 5 units)  -His GI doc is Dr. Koch, consult appreciated  -no active bleeding at this time. however ongoing drop in hb. Dr. Koch will take patient for EGD /COlo in AM  - Haptoglobin is negative for hemolysis  -send fecal occult  -Reticulocytes : 8.6, unliikely to be bone marrow related, however, if scope does not reveal any source of bleeding, then consider heme/onc consultation    2-CAD w/ recent stent placement  -cardio on board  -recco to take off aspirin temporarily till gi bleed resolves  -will re-evaluate when hb more stable      3-Chronic copd  w/ chronic respiratory failure on home o2 and bipap nightly  -c/w night bipap and supplemental o2  -currently comfortable and not in exacerbation    4- Acute renal failure on stage III CKD possibly secondary to ATN  -baseline creatinine is 2.4 (2017)  -admited with 3.51 > 105/2.83 > 107/ 2.5  -likley secondary to ATN secondary to to volume loss  -nephrology services on board  -will continue to volume replenish and repeat am bmp  -hold lasix as per nephro    5- Acute back pain  -on admit pateint c/o back pain, however he did not verbalize these concerns to me. This pain is likley acute on chronic  -spinal saw him, recco MRI  -mri reveals chronic lumbar changes. recco conservative managment.      6-Paroxysmal atrial fibrillation.  Plan: currently in sinus rhythm on EKG   not on AC due to hx of GI bleed.     7-Chronic diastolic congestive heart failure.   -not in exacerbation    8-DVt proph- b/l SCDS only secondary to gi bleed    9-mild hypernatremia- possible secondary to the blood tranfusion/ mild dehydration  -promote oral fluid intake.   -hold lasix

## 2017-03-23 NOTE — PROGRESS NOTE ADULT - ASSESSMENT
Anemia- Management pre primary team and GI team. He is for EGD per GI team.  Pt is off antiplatelet therapy so far.  He can proceed with EGD which is a low risk procedure without any further cardiac testing. Continue cardizem periop as tolerated by the HR and BP.  Pt is optimized from cardiac standpoint to proceed with the EGD.    CAD, s/p PCI and atherectomy- pt is off anticoagulation and antiplatelet agents. Benefits outweigh risks with antiplatelet agents now.  Continue statin.    HTN- continue current meds.    Afib- in sinus rythm.  Not on anticoagulation.  Per pulmonary team pt is not an optimal candidate for amiodarone since he has already low lung reserve.      Thank you for allowing me to participate in the care of this patient. Please feel free to contact me with any questions.

## 2017-03-23 NOTE — PROGRESS NOTE ADULT - ASSESSMENT
CKD ARYA  Valsartan dc d, creat is better  hypernatremia due to diuresis, may take po fluids  to get Bowel prep, hold off on further diuresis  follow na creat labs  monitor MM spasms, may need to reduce gabapentin further, but so far improved

## 2017-03-23 NOTE — PROGRESS NOTE ADULT - ASSESSMENT
Hx of multiple comorbid dx    anemia  likely multifactorial with GIB and redal dx, Acdx  pt is at very high risk for endoscopic intervantion and would cont PPI and serial Hct  however, as he has received multiple units of PRBC, plan EGD colon  DW pt, D and cardiology  pulcompa [facility] and DW Dr Maxwell  anesthesia informed,     hard to assess melena as he has chronic black stool on Fe tab    serial Hct, keep hgb over 8      copd, home o2  CAD, on ASA    ASA stopped      constipation, recent, self disimpaction  laxatives  cont tx      LBP avoid NSAIDs

## 2017-03-23 NOTE — PROGRESS NOTE ADULT - SUBJECTIVE AND OBJECTIVE BOX
HPI:   81 y/o male with PMHx of COPD, CHF, chronic renal failure, paroxysmal A-fib.with last hospitaliztion 10/2017 for hematochezia s/p bleeding scan and flex sigmoidoscopy  was BIBA for acute worsening lower back pain radiating to buttock to foot   patient with amber boots for chronic venous stasis ulcers and lower extremity chronic edema ,daughter reports increased oozing from ulcers in the past few weeks, no fever.  3/23- Pt seen and examined by me today. He denies any symptoms today.          Pt was evaluated by GI team and he did not have recurrence of hematochezia.  PMHx:   HTN,  COPD on home O2 2L,  SHAYY on nocturnal BIPAP,  Chronic diastolic CHF,  CAD s/p PCI with stent in 2002,  Last cath in july 2014 with RCA disease medically managed, Hyperlipidemia,  PVD,  Iron deficiency anemia,  Chronic back pain,  Gout,  BPH,  CKD III with baseline Cr 2.2,  PAF not on a/c,  Hx of GI Bleed in the past, hemorrhoids s/p banding,  PSHx: ::  Right rotator cuff repair  pilonidal cyst  Family History: ::  Father: rectal CA  Mother: HTN, DM  3 siblings: DM  Social History: ::  Tobacco: smoked 1ppd X 50 years, quit 22 years ago.  Rare ETOH use.  wife recently passed away last year (21 Mar 2017 06:35)      PAST MEDICAL & SURGICAL HISTORY:  Sepsis, due to unspecified organism: 2/2 poorly healing wounds b/l  BPH (benign prostatic hypertrophy)  Hyperlipemia  Coronary artery disease  Hypertension  Dyspepsia: On moderate exertion.  Sleep apnea, obstructive: Requires home 02 therapy, and treatment with BIPAP  Atelectasis  Pleural effusion, bilateral  Respiratory failure  Peripheral edema  CRI (chronic renal insufficiency)  Gout  CRF (chronic renal failure)  Benign prostatic hypertrophy  Spinal stenosis  Hypercholesterolemia  GERD (gastroesophageal reflux disease)  CAD (coronary artery disease)  Hypertension  S/P angioplasty with stent  Cataract of left eye  Prostate: Surgery green light procedure.  S/P rotator cuff surgery: Right  S/P angioplasty  Rotator cuff tear, right: repair      MEDICATIONS  (STANDING):  pantoprazole Infusion 8mG/Hr IV Continuous <Continuous>  finasteride 5milliGRAM(s) Oral daily  buDESOnide   0.5 milliGRAM(s) Respule 0.5milliGRAM(s) Nebulizer two times a day  aspirin enteric coated 81milliGRAM(s) Oral daily  doxazosin 8milliGRAM(s) Oral at bedtime  isosorbide   mononitrate ER Tablet (IMDUR) 120milliGRAM(s) Oral daily  diltiazem    Tablet 120milliGRAM(s) Oral two times a day  gabapentin Oral Tab/Cap - Peds 600milliGRAM(s) Oral at bedtime  gabapentin 300milliGRAM(s) Oral daily  allopurinol 100milliGRAM(s) Oral daily  loratadine 10milliGRAM(s) Oral daily  fenofibrate Tablet 145milliGRAM(s) Oral daily  simvastatin 10milliGRAM(s) Oral at bedtime  pramipexole 0.125milliGRAM(s) Oral three times a day  metoprolol Oral Tab/Cap - Peds 12.5milliGRAM(s) Oral two times a day  famotidine    Tablet 20milliGRAM(s) Oral at bedtime  ferrous    sulfate 325milliGRAM(s) Oral three times a day with meals  hydrALAZINE 150milliGRAM(s) Oral two times a day  pantoprazole    Tablet 40milliGRAM(s) Oral two times a day before meals  ammonium lactate 12% Lotion 1Application(s) Topical two times a day  heparin  Injectable 5000Unit(s) SubCutaneous every 8 hours    MEDICATIONS  (PRN):  nitroglycerin     SubLingual 0.4milliGRAM(s) SubLingual every 5 minutes PRN Chest Pain      Allergies    No Known Allergies    Intolerances        SOCIAL HISTORY: Denies tobacco, etoh abuse or illicit drug use    FAMILY HISTORY:  No pertinent family history in first degree relatives      Vital Signs Last 24 Hrs  T(C): 36.6, Max: 36.7 (03-20 @ 22:39)  T(F): 97.9, Max: 98.1 (03-20 @ 22:39)  HR: 95 (90 - 112)  BP: 121/53 (121/53 - 140/63)  BP(mean): --  RR: 18 (16 - 18)  SpO2: 93% (93% - 100%)    REVIEW OF SYSTEMS:    CONSTITUTIONAL:  As per HPI.  HEENT:  Eyes:  No diplopia or blurred vision. ENT:  No earache, sore throat or runny nose.  CARDIOVASCULAR:  No pressure, squeezing, strangling, tightness, heaviness or aching about the chest, neck, axilla or epigastrium.  RESPIRATORY:  No cough, shortness of breath, PND or orthopnea.  GASTROINTESTINAL:  No nausea, vomiting or diarrhea.  GENITOURINARY:  No dysuria, frequency or urgency.  MUSCULOSKELETAL:  As per HPI.  SKIN:  No change in skin, hair or nails.  NEUROLOGIC:  No paresthesias, fasciculations, seizures or weakness.  PSYCHIATRIC:  No disorder of thought or mood.  ENDOCRINE:  No heat or cold intolerance, polyuria or polydipsia.  HEMATOLOGICAL:  No easy bruising or bleedings:  .     PHYSICAL EXAMINATION:    GENERAL APPEARANCE:  Pt. is not currently dyspneic, in no distress. Pt. is alert, oriented, and pleasant.  HEENT:  Pupils are normal and react normally. No icterus. Mucous membranes well colored.  NECK:  Supple. No lymphadenopathy. Jugular venous pressure not elevated. Carotids equal.   HEART:   The cardiac impulse has a normal quality. There are no murmurs, rubs or gallops noted  CHEST:  Chest is clear to auscultation. Normal respiratory effort.  ABDOMEN:  Soft and nontender.   EXTREMITIES:  There is no edema.   SKIN:  No rash or significant lesions are noted.    I&O's Summary      LABS:                        6.3    8.6   )-----------( 337      ( 20 Mar 2017 22:39 )             20.0   20 Mar 2017 22:39    147    |  114    |  114    ----------------------------<  124    4.4     |  23     |  3.51     Ca    8.1        20 Mar 2017 22:39    TPro  5.7    /  Alb  2.5    /  TBili  0.2    /  DBili  x      /  AST  19     /  ALT  14     /  AlkPhos  34     20 Mar 2017 22:39  LIVER FUNCTIONS - ( 20 Mar 2017 22:39 )  Alb: 2.5 g/dL / Pro: 5.7 gm/dL / ALK PHOS: 34 U/L / ALT: 14 U/L / AST: 19 U/L / GGT: x           PT/INR - ( 20 Mar 2017 22:39 )   PT: 11.3 sec;   INR: 1.03 ratio         PTT - ( 20 Mar 2017 22:39 )  PTT:33.6 secCARDIAC MARKERS ( 21 Mar 2017 03:42 )  0.018 ng/mL / x     / 107 U/L / x     / x            TELEMETRY:    Normal sinus rhythm with no tachy or nelly events

## 2017-03-23 NOTE — PROGRESS NOTE ADULT - SUBJECTIVE AND OBJECTIVE BOX
Patient is a 82y old  Male who presents with a chief complaint of left leg pain radiating from left buttoch and thigh (21 Mar 2017 06:35)      HPI:  HPI: :: 80 y/o male with PMHx of COPD, CHF, chronic renal failure, paroxysmal A-fib.with last hospitaliztion 10/2017 for hematochezia s/p bleeding scan and flex sigmoidoscopy  was BIBA for acute worsening lower back pain radiating to buttock to foot   patient with amber boots for chronic venous stasis ulcers and lower extremity chronic edema ,daughter reports increased oozing from ulcers in lai past few weeks,no fever  In Ed patient noted to have Hb 6 and stool guaiac positive  and rectal exam- with cookie  done by ED physician as per note   Patient denies any chest pain or worsening of SOB ,no abdominal pain or vomiting ,no diarrhea,no efver or chills    pt comf  received PRBC yesterday  neg N V  pos BM, dark as usual per pt and is unchanged  neg red blood  neg cp and has fatigue  lower abd discomfort without CP or sob, resolved after BM      Hx per pt and daughter    PMHx: ::  HTN,  COPD on home O2 2L,  SHAYY on nocturnal BIPAP,  Chronic diastolic CHF,  CAD s/p PCI with stent in 2002,  Last cath in july 2014 with RCA disease medically managed, Hyperlipidemia,  PVD,  Iron deficiency anemia,  Chronic back pain,  Gout,  BPH,  CKD III with baseline Cr 2.2,  PAF not on a/c,  Hx of GI Bleed in the past, hemorrhoids s/p banding,  PSHx: ::  Right rotator cuff repair  pilonidal cyst  Family History: ::  Father: rectal CA  Mother: HTN, DM  3 siblings: DM  Social History: ::  Tobacco: smoked 1ppd X 50 years, quit 22 years ago.  Rare ETOH use.  wife recently passed away last year (21 Mar 2017 06:35)      PAST MEDICAL & SURGICAL HISTORY:  Sepsis, due to unspecified organism: 2/2 poorly healing wounds b/l  BPH (benign prostatic hypertrophy)  Hyperlipemia  Coronary artery disease  Hypertension  Dyspepsia: On moderate exertion.  Sleep apnea, obstructive: Requires home 02 therapy, and treatment with BIPAP  Atelectasis  Pleural effusion, bilateral  Respiratory failure  Peripheral edema  CRI (chronic renal insufficiency)  Gout  CRF (chronic renal failure)  Benign prostatic hypertrophy  Spinal stenosis  Hypercholesterolemia  GERD (gastroesophageal reflux disease)  CAD (coronary artery disease)  Hypertension  S/P angioplasty with stent  Cataract of left eye  Prostate: Surgery green light procedure.  S/P rotator cuff surgery: Right  S/P angioplasty  Rotator cuff tear, right: repair      MEDICATIONS  (STANDING):  finasteride 5milliGRAM(s) Oral daily  buDESOnide   0.5 milliGRAM(s) Respule 0.5milliGRAM(s) Nebulizer two times a day  doxazosin 8milliGRAM(s) Oral at bedtime  isosorbide   mononitrate ER Tablet (IMDUR) 120milliGRAM(s) Oral daily  gabapentin Oral Tab/Cap - Peds 600milliGRAM(s) Oral at bedtime  allopurinol 100milliGRAM(s) Oral daily  loratadine 10milliGRAM(s) Oral daily  fenofibrate Tablet 145milliGRAM(s) Oral daily  simvastatin 10milliGRAM(s) Oral at bedtime  pramipexole 0.125milliGRAM(s) Oral three times a day  metoprolol Oral Tab/Cap - Peds 12.5milliGRAM(s) Oral two times a day  hydrALAZINE 150milliGRAM(s) Oral two times a day  ammonium lactate 12% Lotion 1Application(s) Topical two times a day  diltiazem   CD 240milliGRAM(s) Oral daily  senna 2Tablet(s) Oral at bedtime  glycerin Suppository - Adult 1Suppository(s) Rectal daily  furosemide    Tablet 40milliGRAM(s) Oral daily  acetaminophen   Tablet 650milliGRAM(s) Oral every 6 hours  pantoprazole  Injectable 40milliGRAM(s) IV Push every 12 hours  polyethylene glycol/electrolyte Solution. 4000milliLiter(s) Oral once    MEDICATIONS  (PRN):  nitroglycerin     SubLingual 0.4milliGRAM(s) SubLingual every 5 minutes PRN Chest Pain  ondansetron Injectable 4milliGRAM(s) IV Push every 6 hours PRN Nausea and/or Vomiting      Allergies    No Known Allergies    Intolerances        SOCIAL HISTORY:unchanged    FAMILY HISTORY:  No pertinent family history in first degree relatives      REVIEW OF SYSTEMS:    CONSTITUTIONAL: No weakness, fevers or chills  EYES/ENT: No visual changes;  No vertigo or throat pain   NECK: No pain or stiffness  RESPIRATORY: No cough, wheezing, hemoptysis; No shortness of breath  CARDIOVASCULAR: No chest pain or palpitations  GENITOURINARY: No dysuria, frequency or hematuria  NEUROLOGICAL: No numbness or weakness  SKIN: No itching, burning, rashes, or lesions   All other review of systems is negative unless indicated above.    Vital Signs Last 24 Hrs  T(C): 37, Max: 37.5 (03-22 @ 23:43)  T(F): 98.6, Max: 99.5 (03-22 @ 23:43)  HR: 74 (66 - 96)  BP: 177/45 (128/35 - 178/45)  BP(mean): --  RR: 18 (16 - 18)  SpO2: 96% (95% - 100%)    PHYSICAL EXAM:    Constitutional: NAD, well-developed  HEENT: EOMI, throat clear  Neck: No LAD, supple  Respiratory: CTA and P  Cardiovascular: S1 and S2, RRR, no M  Gastrointestinal: BS+, soft, NT/ND, neg HSM,  Extremities: No peripheral edema, neg clubing, cyanosis  Vascular: 2+ peripheral pulses  Neurological: A/O x 3, no focal deficits  Psychiatric: Normal mood, normal affect  Skin: No rashes    LABS:  CBC Full  -  ( 23 Mar 2017 07:54 )  WBC Count : x  Hemoglobin : 8.2 g/dL  Hematocrit : 25.4 %  Platelet Count - Automated : x  Mean Cell Volume : x  Mean Cell Hemoglobin : x  Mean Cell Hemoglobin Concentration : x  Auto Neutrophil # : x  Auto Lymphocyte # : x  Auto Monocyte # : x  Auto Eosinophil # : x  Auto Basophil # : x  Auto Neutrophil % : x  Auto Lymphocyte % : x  Auto Monocyte % : x  Auto Eosinophil % : x  Auto Basophil % : x    23 Mar 2017 05:00    151    |  118    |  107    ----------------------------<  113    4.5     |  23     |  2.50     Ca    7.8        23 Mar 2017 05:00    TPro  5.7    /  Alb  2.4    /  TBili  0.5    /  DBili  x      /  AST  23     /  ALT  11     /  AlkPhos  35     21 Mar 2017 09:55    PT/INR - ( 21 Mar 2017 09:55 )   PT: 11.9 sec;   INR: 1.08 ratio         PTT - ( 21 Mar 2017 09:55 )  PTT:35.5 sec        RADIOLOGY & ADDITIONAL STUDIES:EXAM:  LUMBAR SPINE(MRI)W O CON                            PROCEDURE DATE:  03/21/2017        INTERPRETATION:    MR lumbar without gadolinium       CLINICAL INFORMATION:   Acute worsening of RIGHT leg pain for 3 weeks        TECHNIQUE:   Sagittal and axial T1-weighted images, sagittal STIR images,   sagittal T2-weighted images and axial T1 and T2-weighted images of the   lumbar spine were obtained.         FINDINGS:   No prior similar studies are available for review.         Lumbar vertebral alignment is preserved.  Lumbar vertebral body heights   are maintained.  Marrow signal intensity within lumbar vertebral bodies   and posterior elements is unremarkable.  No osseous expansion, epidural   disease or paraspinal abnormality is found.     Lumbar intervertebral discs show mild disc degeneration at L2-L3 through   L4-5 with loss of disc height and signal within the nucleus pulposus.   Disc bulges are noted at these levels which flatten the ventral thecal   sac and narrow the BILATERAL neural foramina. Facet osteophytic   hypertrophy is noted at C L3-4 through L5-S1 with multiple posterior   projecting synovial cyst involving the LEFT L3-4, BILATERAL L4-5 and   BILATERAL L5-S1 facet joints.    The distal cord maintains intact morphology.  Distal cord signal   intensity is preserved.  The conus is normally positioned at the T12   level.  Nerve roots of the cauda equina appear intact.        IMPRESSION:   Mild disc degeneration at L2-L3 through L4-5 with bulges at   these levels which flatten the ventral thecal sac and narrow the   BILATERAL neural foramina. Facet osteophytic hypertrophy is noted at C   L3-4 through L5-S1 with multiple posterior projecting synovial cyst   involving the LEFT L3-4, BILATERAL L4-5 and BILATERAL L5-S1 facet joints.                  COCO WYNN M.D., ATTENDING RADIOLOGIST  This document has been electronically signed. Mar 21 2017  8:16PM

## 2017-03-24 DIAGNOSIS — J96.12 CHRONIC RESPIRATORY FAILURE WITH HYPERCAPNIA: ICD-10-CM

## 2017-03-24 DIAGNOSIS — N18.3 CHRONIC KIDNEY DISEASE, STAGE 3 (MODERATE): ICD-10-CM

## 2017-03-24 DIAGNOSIS — M48.07 SPINAL STENOSIS, LUMBOSACRAL REGION: ICD-10-CM

## 2017-03-24 DIAGNOSIS — K25.7 CHRONIC GASTRIC ULCER WITHOUT HEMORRHAGE OR PERFORATION: ICD-10-CM

## 2017-03-24 DIAGNOSIS — N17.9 ACUTE KIDNEY FAILURE, UNSPECIFIED: ICD-10-CM

## 2017-03-24 LAB
ANION GAP SERPL CALC-SCNC: 10 MMOL/L — SIGNIFICANT CHANGE UP (ref 5–17)
BUN SERPL-MCNC: 85 MG/DL — HIGH (ref 7–23)
CALCIUM SERPL-MCNC: 7.8 MG/DL — LOW (ref 8.5–10.1)
CHLORIDE SERPL-SCNC: 116 MMOL/L — HIGH (ref 96–108)
CO2 SERPL-SCNC: 24 MMOL/L — SIGNIFICANT CHANGE UP (ref 22–31)
CREAT SERPL-MCNC: 2.08 MG/DL — HIGH (ref 0.5–1.3)
EPO SERPL-MCNC: 36.6 MIU/ML — HIGH (ref 2.6–18.5)
GLUCOSE SERPL-MCNC: 155 MG/DL — HIGH (ref 70–99)
HCT VFR BLD CALC: 24.3 % — LOW (ref 39–50)
HGB BLD-MCNC: 7.8 G/DL — LOW (ref 13–17)
MAGNESIUM SERPL-MCNC: 2.9 MG/DL — HIGH (ref 1.8–2.4)
PHOSPHATE SERPL-MCNC: 2.6 MG/DL — SIGNIFICANT CHANGE UP (ref 2.5–4.5)
POTASSIUM SERPL-MCNC: 4 MMOL/L — SIGNIFICANT CHANGE UP (ref 3.5–5.3)
POTASSIUM SERPL-SCNC: 4 MMOL/L — SIGNIFICANT CHANGE UP (ref 3.5–5.3)
SODIUM SERPL-SCNC: 150 MMOL/L — HIGH (ref 135–145)

## 2017-03-24 PROCEDURE — 88312 SPECIAL STAINS GROUP 1: CPT | Mod: 26

## 2017-03-24 PROCEDURE — 88313 SPECIAL STAINS GROUP 2: CPT | Mod: 26

## 2017-03-24 PROCEDURE — 88305 TISSUE EXAM BY PATHOLOGIST: CPT | Mod: 26

## 2017-03-24 RX ORDER — DIPHENHYDRAMINE HCL 50 MG
12.5 CAPSULE ORAL EVERY 6 HOURS
Qty: 0 | Refills: 0 | Status: DISCONTINUED | OUTPATIENT
Start: 2017-03-24 | End: 2017-03-24

## 2017-03-24 RX ORDER — PANTOPRAZOLE SODIUM 20 MG/1
8 TABLET, DELAYED RELEASE ORAL
Qty: 80 | Refills: 0 | Status: DISCONTINUED | OUTPATIENT
Start: 2017-03-24 | End: 2017-03-25

## 2017-03-24 RX ORDER — DIPHENHYDRAMINE HCL 50 MG
12.5 CAPSULE ORAL EVERY 6 HOURS
Qty: 0 | Refills: 0 | Status: DISCONTINUED | OUTPATIENT
Start: 2017-03-24 | End: 2017-04-05

## 2017-03-24 RX ADMIN — Medication 100 MILLIGRAM(S): at 12:43

## 2017-03-24 RX ADMIN — PRAMIPEXOLE DIHYDROCHLORIDE 0.12 MILLIGRAM(S): 0.12 TABLET ORAL at 22:24

## 2017-03-24 RX ADMIN — Medication 240 MILLIGRAM(S): at 06:45

## 2017-03-24 RX ADMIN — PANTOPRAZOLE SODIUM 10 MG/HR: 20 TABLET, DELAYED RELEASE ORAL at 15:54

## 2017-03-24 RX ADMIN — ISOSORBIDE MONONITRATE 120 MILLIGRAM(S): 60 TABLET, EXTENDED RELEASE ORAL at 12:42

## 2017-03-24 RX ADMIN — PANTOPRAZOLE SODIUM 40 MILLIGRAM(S): 20 TABLET, DELAYED RELEASE ORAL at 06:44

## 2017-03-24 RX ADMIN — PRAMIPEXOLE DIHYDROCHLORIDE 0.12 MILLIGRAM(S): 0.12 TABLET ORAL at 15:54

## 2017-03-24 RX ADMIN — Medication 150 MILLIGRAM(S): at 06:44

## 2017-03-24 RX ADMIN — Medication 0.5 MILLIGRAM(S): at 09:11

## 2017-03-24 RX ADMIN — GABAPENTIN 600 MILLIGRAM(S): 400 CAPSULE ORAL at 22:26

## 2017-03-24 RX ADMIN — Medication 150 MILLIGRAM(S): at 18:23

## 2017-03-24 RX ADMIN — Medication 12.5 MILLIGRAM(S): at 18:22

## 2017-03-24 RX ADMIN — Medication 145 MILLIGRAM(S): at 12:42

## 2017-03-24 RX ADMIN — Medication 650 MILLIGRAM(S): at 22:35

## 2017-03-24 RX ADMIN — SIMVASTATIN 10 MILLIGRAM(S): 20 TABLET, FILM COATED ORAL at 22:25

## 2017-03-24 RX ADMIN — Medication 8 MILLIGRAM(S): at 22:24

## 2017-03-24 RX ADMIN — Medication 1 APPLICATION(S): at 06:48

## 2017-03-24 RX ADMIN — Medication 12.5 MILLIGRAM(S): at 06:45

## 2017-03-24 RX ADMIN — LORATADINE 10 MILLIGRAM(S): 10 TABLET ORAL at 12:53

## 2017-03-24 RX ADMIN — SENNA PLUS 2 TABLET(S): 8.6 TABLET ORAL at 22:25

## 2017-03-24 RX ADMIN — Medication 1 APPLICATION(S): at 18:36

## 2017-03-24 RX ADMIN — FINASTERIDE 5 MILLIGRAM(S): 5 TABLET, FILM COATED ORAL at 12:43

## 2017-03-24 NOTE — PROGRESS NOTE ADULT - SUBJECTIVE AND OBJECTIVE BOX
NEPHROLOGY INTERVAL HPI/OVERNIGHT EVENTS:  Colonscopy with lipoma in transverse colon  EGD with ulcers noted  no c/o        MEDICATIONS  (STANDING):  finasteride 5milliGRAM(s) Oral daily  buDESOnide   0.5 milliGRAM(s) Respule 0.5milliGRAM(s) Nebulizer two times a day  doxazosin 8milliGRAM(s) Oral at bedtime  isosorbide   mononitrate ER Tablet (IMDUR) 120milliGRAM(s) Oral daily  gabapentin Oral Tab/Cap - Peds 600milliGRAM(s) Oral at bedtime  allopurinol 100milliGRAM(s) Oral daily  loratadine 10milliGRAM(s) Oral daily  fenofibrate Tablet 145milliGRAM(s) Oral daily  simvastatin 10milliGRAM(s) Oral at bedtime  pramipexole 0.125milliGRAM(s) Oral three times a day  metoprolol Oral Tab/Cap - Peds 12.5milliGRAM(s) Oral two times a day  hydrALAZINE 150milliGRAM(s) Oral two times a day  ammonium lactate 12% Lotion 1Application(s) Topical two times a day  diltiazem   CD 240milliGRAM(s) Oral daily  senna 2Tablet(s) Oral at bedtime  glycerin Suppository - Adult 1Suppository(s) Rectal daily  acetaminophen   Tablet 650milliGRAM(s) Oral every 6 hours  pantoprazole Infusion 8mG/Hr IV Continuous <Continuous>    MEDICATIONS  (PRN):  nitroglycerin     SubLingual 0.4milliGRAM(s) SubLingual every 5 minutes PRN Chest Pain  ondansetron Injectable 4milliGRAM(s) IV Push every 6 hours PRN Nausea and/or Vomiting  diphenhydrAMINE   Elixir 12.5milliGRAM(s) Oral every 6 hours PRN Rash and/or Itching      Allergies    No Known Allergies    Intolerances        I&O's Detail    I & Os for current day (as of 24 Mar 2017 10:53)  =============================================  IN:    Total IN: 0 ml  ---------------------------------------------  OUT:    Stool: 500 ml    Total OUT: 500 ml  ---------------------------------------------  Total NET: -500 ml    Vital Signs Last 24 Hrs  T(C): 36.7, Max: 36.9 (03-23 @ 11:02)  T(F): 98, Max: 98.4 (03-23 @ 11:02)  HR: 69 (69 - 98)  BP: 145/80 (139/42 - 149/41)  BP(mean): --  RR: 16 (16 - 18)  SpO2: 94% (94% - 100%)  Daily     Daily     PHYSICAL EXAM:  General: alert. awake Ox3  HEENT: MMM  CV: s1s2 rrr  LUNGS: B/L CTA  EXT: dressing in place, no edema surrounding    LABS:                        7.8    x     )-----------( x        ( 24 Mar 2017 05:32 )             24.3     23 Mar 2017 05:00    151    |  118    |  107    ----------------------------<  113    4.5     |  23     |  2.50     Ca    7.8        23 Mar 2017 05:00            ABG - ( 23 Mar 2017 11:07 )  pH: 7.40  /  pCO2: 35    /  pO2: 109   / HCO3: 21    / Base Excess: -3    /  SaO2: 99

## 2017-03-24 NOTE — PROGRESS NOTE ADULT - SUBJECTIVE AND OBJECTIVE BOX
Hospital Day: 3  CC:Patient is a 82y old  Male who presents with a chief complaint of left leg pain radiating from left buttoch and thigh (21 Mar 2017 06:35)      HPI: :: 82 y/o male with PMHx of COPD, CHF, chronic renal failure, paroxysmal A-fib.with last hospitaliztion 10/2017 for hematochezia s/p bleeding scan and flex sigmoidoscopy  was BIBA for acute worsening lower back pain radiating to buttock to foot.  In Ed patient noted to have Hb 6 and stool guaiac positive  and rectal exam- with cookie  done by ED physician as per note     PMHx: ::  HTN,  COPD on home O2 2L,  SHAYY on nocturnal BIPAP,  Chronic diastolic CHF,  CAD s/p PCI with stent in 2002,  Last cath in july 2014 with RCA disease medically managed, Hyperlipidemia,  PVD,  Iron deficiency anemia,  Chronic back pain,  Gout,  BPH,  CKD III with baseline Cr 2.2,  PAF not on a/c,  Hx of GI Bleed in the past, hemorrhoids s/p banding,    SUBJECTIVE & OBJECTIVE:   3/24/17 Pt seen and examined at bedside this morning with daughters.  He is s/p colonoscopy and endoscopy with findings of non-bleeding gastric ulcers and 3 cm submucosal mass in transverse colon.  Patient has no complaints of shortness of breath, chest pain, or episodes of blood per rectum.  He does complain of abdominal pruritus and rash on the bilateral upper arms and abdomen which has been present prior to admission.     PHYSICAL EXAM:  T(C): 36.7, Max: 36.9 (03-23 @ 11:02)  HR: 69 (69 - 98)  BP: 145/80 (139/42 - 149/41)  RR: 16 (16 - 18)  SpO2: 94% (94% - 100%)  Wt(kg): --   GENERAL: NAD, well-groomed, well-developed  HEAD:  Atraumatic, Normocephalic  EYES: EOMI, PERRLA, conjunctiva and sclera clear  ENMT: Moist mucous membranes  NECK: Supple, No JVD  NERVOUS SYSTEM:  Alert & Oriented X3, Motor Strength 5/5 B/L upper and lower extremities; DTRs 2+ intact and symmetric  CHEST/LUNG: poor air entry bilaterally with fine expiratory wheezing.   HEART: Regular rate and rhythm; No murmurs, rubs, or gallops  ABDOMEN: obese abdomen with fine maculo-papular rash.  Soft, Nontender, Nondistended; Bowel sounds present  EXTREMITIES:  legs are wrapped bilaterally with dry crusting of the skin.  No signs of inflammation.         MEDICATIONS  (STANDING):  finasteride 5milliGRAM(s) Oral daily  buDESOnide   0.5 milliGRAM(s) Respule 0.5milliGRAM(s) Nebulizer two times a day  doxazosin 8milliGRAM(s) Oral at bedtime  isosorbide   mononitrate ER Tablet (IMDUR) 120milliGRAM(s) Oral daily  gabapentin Oral Tab/Cap - Peds 600milliGRAM(s) Oral at bedtime  allopurinol 100milliGRAM(s) Oral daily  loratadine 10milliGRAM(s) Oral daily  fenofibrate Tablet 145milliGRAM(s) Oral daily  simvastatin 10milliGRAM(s) Oral at bedtime  pramipexole 0.125milliGRAM(s) Oral three times a day  metoprolol Oral Tab/Cap - Peds 12.5milliGRAM(s) Oral two times a day  hydrALAZINE 150milliGRAM(s) Oral two times a day  ammonium lactate 12% Lotion 1Application(s) Topical two times a day  diltiazem   CD 240milliGRAM(s) Oral daily  senna 2Tablet(s) Oral at bedtime  glycerin Suppository - Adult 1Suppository(s) Rectal daily  acetaminophen   Tablet 650milliGRAM(s) Oral every 6 hours    MEDICATIONS  (PRN):  nitroglycerin     SubLingual 0.4milliGRAM(s) SubLingual every 5 minutes PRN Chest Pain  ondansetron Injectable 4milliGRAM(s) IV Push every 6 hours PRN Nausea and/or Vomiting  diphenhydrAMINE   Elixir 12.5milliGRAM(s) Oral every 6 hours PRN Rash and/or Itching      LABS:                        7.8    x     )-----------( x        ( 24 Mar 2017 05:32 )             24.3     23 Mar 2017 05:00    151    |  118    |  107    ----------------------------<  113    4.5     |  23     |  2.50     Ca    7.8        23 Mar 2017 05:00      ABG - ( 23 Mar 2017 11:07 )  pH: 7.40  /  pCO2: 35    /  pO2: 109   / HCO3: 21    / Base Excess: -3    /  SaO2: 99          RECENT CULTURES:  Culture - Blood (03.21.17 @ 03:41)    Specimen Source: .Blood Blood    Culture Results:   No growth to date.    Culture - Blood (03.20.17 @ 22:38)    Specimen Source: .Blood None    Culture Results:   No growth to date.        RADIOLOGY & ADDITIONAL TESTS:  Upper Endoscopy  Diffuse erythematous mucosa without bleeding was found in the gastric     antrum. Biopsies were taken with a cold forceps for histology.     Four non-bleeding cratered gastric ulcers with pigmented material were      found in the gastric fundus and on the greater curvature of the gastric     body. The largest lesion was 15 mm in largest dimension.    ulcer with eschar and flat pifmented spot coating ulcer, not amenable to      tx, 2 nd opinion from GI obtained intaop and agreed    Localized pigmented spots, possible hemosiderin deposits mucosal      variance was found in the duodenal bulb. Biopsieswere taken with a cold     forceps for histology.      Colonoscopy    A submucosal non-obstructing medium-sized mass was found in the       transverse colon. The mass was non-circumferential. The mass measured       three cm in length. In addition, its diameter measured seven mm. No      bleeding was present.  prep was limited and many washes were done      mucosa evaluated on insertion and WD      DVT/GI ppx  Discussed with pt @ bedside

## 2017-03-24 NOTE — PHYSICAL THERAPY INITIAL EVALUATION ADULT - CRITERIA FOR SKILLED THERAPEUTIC INTERVENTIONS
rehab potential/impairments found/anticipated discharge recommendation/anticipated equipment needs at discharge

## 2017-03-24 NOTE — PROGRESS NOTE ADULT - ASSESSMENT
80 y/o male with PMHx of COPD, CHF, chronic renal failure, paroxysmal A-fib.with last hospitaliztion 10/2017 for hematochezia s/p bleeding scan and flex sigmoidoscopy  was BIBA  now being worked up for GI bleed and has required multiple 4-5 units PRBCs done  now needs eval for upper and likley lower endoscopy  Chronic hypoxemic resp failure  CHF appears stable  P Afib now in nsr  hemodynamically stabe  SHAYY / OHS /COPD-overlap syndrome  suggest ABG and repeat cxr today to complete her workup  I've explained to pt and his DTR, critical care RN that pt is at incresaed risk of resp failure requiring mechanical ventilation with sedation, but his pulm status appears optimized presently for this indicated procedure with recent requirement to get muliple prbcs over last few days  recommend  ABG  cxr  cont nocturnal BIPAP  Anesthesia veal before his procedure  cardiac eval in progress  will check outpt pfts, placed in chart  ABG noted  FEV1 6/2016 1.38, 68% predicted  DLCO 45% predicted  supportive care  BIPAP 12/8 with o2 2 liters attached

## 2017-03-24 NOTE — PHYSICAL THERAPY INITIAL EVALUATION ADULT - ACTIVE RANGE OF MOTION EXAMINATION, REHAB EVAL
except ankle ROM not tested as b/l LEs are wrapped in ace banadage/bilateral  lower extremity Active ROM was WFL (within functional limits)/bilateral upper extremity Active ROM was WFL (within functional limits)

## 2017-03-24 NOTE — PROGRESS NOTE ADULT - SUBJECTIVE AND OBJECTIVE BOX
Patient is a 82y old  Male who presents with a chief complaint of left leg pain radiating from left buttoch and thigh (21 Mar 2017 06:35)      HPI:  HPI: :: 82 y/o male with PMHx of COPD, CHF, chronic renal failure, paroxysmal A-fib.with last hospitaliztion 10/2017 for hematochezia s/p bleeding scan and flex sigmoidoscopy  was BIBA for acute worsening lower back pain radiating to buttock to foot   patient with amber boots for chronic venous stasis ulcers and lower extremity chronic edema ,daughter reports increased oozing from ulcers in lai past few weeks,no fever  In Ed patient noted to have Hb 6 and stool guaiac positive  and rectal exam- with cookie  done by ED physician as per note   Patient denies any chest pain or worsening of SOB ,no abdominal pain or vomiting ,no diarrhea,no efver or chills  seen and evaluated along with Dr SANTIZO  His dtr is contacted as well    all his recent data and PFT data obtained and discussed with his cardiologist today 3/24  pt is scheduled for his GI workup today    PMHx: ::  HTN,  COPD on home O2 2L,  SHAYY on nocturnal BIPAP,  Chronic diastolic CHF,  CAD s/p PCI with stent in 2002,  Last cath in july 2014 with RCA disease medically managed, Hyperlipidemia,  PVD,  Iron deficiency anemia,  Chronic back pain,  Gout,  BPH,  CKD III with baseline Cr 2.2,  PAF not on a/c,  Hx of GI Bleed in the past, hemorrhoids s/p banding,  PSHx: ::  Right rotator cuff repair  pilonidal cyst  Family History: ::  Father: rectal CA  Mother: HTN, DM  3 siblings: DM  Social History: ::  Tobacco: smoked 1ppd X 50 years, quit 22 years ago.  Rare ETOH use.  wife recently passed away last year (21 Mar 2017 06:35)      PAST MEDICAL & SURGICAL HISTORY:  Sepsis, due to unspecified organism: 2/2 poorly healing wounds b/l  BPH (benign prostatic hypertrophy)  Hyperlipemia  Coronary artery disease  Hypertension  Dyspepsia: On moderate exertion.  Sleep apnea, obstructive: Requires home 02 therapy, and treatment with BIPAP  Atelectasis  Pleural effusion, bilateral  Respiratory failure  Peripheral edema  CRI (chronic renal insufficiency)  Gout  CRF (chronic renal failure)  Benign prostatic hypertrophy  Spinal stenosis  Hypercholesterolemia  GERD (gastroesophageal reflux disease)  CAD (coronary artery disease)  Hypertension  S/P angioplasty with stent  Cataract of left eye  Prostate: Surgery green light procedure.  S/P rotator cuff surgery: Right  S/P angioplasty  Rotator cuff tear, right: repair      PREVIOUS DIAGNOSTIC TESTING:      MEDICATIONS  (STANDING):  finasteride 5milliGRAM(s) Oral daily  buDESOnide   0.5 milliGRAM(s) Respule 0.5milliGRAM(s) Nebulizer two times a day  doxazosin 8milliGRAM(s) Oral at bedtime  isosorbide   mononitrate ER Tablet (IMDUR) 120milliGRAM(s) Oral daily  gabapentin Oral Tab/Cap - Peds 600milliGRAM(s) Oral at bedtime  allopurinol 100milliGRAM(s) Oral daily  loratadine 10milliGRAM(s) Oral daily  fenofibrate Tablet 145milliGRAM(s) Oral daily  simvastatin 10milliGRAM(s) Oral at bedtime  pramipexole 0.125milliGRAM(s) Oral three times a day  metoprolol Oral Tab/Cap - Peds 12.5milliGRAM(s) Oral two times a day  ferrous    sulfate 325milliGRAM(s) Oral three times a day with meals  hydrALAZINE 150milliGRAM(s) Oral two times a day  ammonium lactate 12% Lotion 1Application(s) Topical two times a day  diltiazem   CD 240milliGRAM(s) Oral daily  senna 2Tablet(s) Oral at bedtime  glycerin Suppository - Adult 1Suppository(s) Rectal daily  furosemide    Tablet 40milliGRAM(s) Oral daily  acetaminophen   Tablet 650milliGRAM(s) Oral every 6 hours  pantoprazole  Injectable 40milliGRAM(s) IV Push every 12 hours    MEDICATIONS  (PRN):  nitroglycerin     SubLingual 0.4milliGRAM(s) SubLingual every 5 minutes PRN Chest Pain      FAMILY HISTORY:  No pertinent family history in first degree relatives          REVIEW OF SYSTEM:  Pertinent items are noted in HPI.  Constitutional negative for chills, fevers, sweats and weight loss  throat, and face:  negative for epistaxis, nasal congestion, sore throat and   tinnitus  Respiratory: negative for cough,pos  dyspnea on exertion,no pleuritic chest pain  and wheezing  Cardiovascular:  negative for chest pain, dyspnea and palpitations  Gastrointestinal: negative for abdominal pain, diarrhea, nausea and vomiting  Genitourinary: negative for dysuria, frequency and urinary incontinence  Skin:  negative for redness, rash, pruritus, swelling, dryness and   fissures  Hematologic/lymphatic: negative for bleeding and easy bruising  Musculoskeletal: negative for arthralgias, back pain and muscle weakness  Neurological: negative for dizziness, headaches, seizures and tremors  Behavioral/Psych:  negative for mood change, depression, suicidal attempts    Allergic/Immunologic: negative for anaphylaxis, angioedema and urticaria    Vital Signs Last 24 Hrs  T(C): 37, Max: 37.5 (03-22 @ 23:43)  T(F): 98.6, Max: 99.5 (03-22 @ 23:43)  HR: 74 (66 - 96)  BP: 177/45 (128/35 - 178/45)  BP(mean): --  RR: 18 (16 - 18)  SpO2: 96% (95% - 100%)    I&O's Summary    I & Os for current day (as of 23 Mar 2017 09:14)  =============================================  IN: 305 ml / OUT: 0 ml / NET: 305 ml    PHYSICAL EXAM  General Appearance: cooperative, no acute distress,   HEENT: PERRL, conjunctiva clear, EOM's intact, non injected pharynx, no exudate, TM   normal  Neck: Supple, , no adenopathy, thyroid: not enlarged, no carotid bruit or JVD  Back: Symmetric, no  tenderness,no soft tissue tenderness  Lungs: Clear to auscultation bilateral,no adventitious breath sounds, normal   expiratory phase  Heart: Regular rate and rhythm, S1, S2 normal, no murmur, rub or gallop  Abdomen: Soft, non-tender, bowel sounds active , no hepatosplenomegaly  Extremities: no cyanosis or edema, no joint swelling  Skin: Skin color, texture normal, no rashes   Neurologic: Alert and oriented X3 , cranial nerves intact, sensory and motor normal,    ECG:    LABS:                              8.2    x     )-----------( x        ( 23 Mar 2017 07:54 )             25.4     23 Mar 2017 05:00    151    |  118    |  107    ----------------------------<  113    4.5     |  23     |  2.50     Ca    7.8        23 Mar 2017 05:00    TPro  5.7    /  Alb  2.4    /  TBili  0.5    /  DBili  x      /  AST  23     /  ALT  11     /  AlkPhos  35     21 Mar 2017 09:55    CARDIAC MARKERS ( 21 Mar 2017 17:23 )  0.028 ng/mL / x     / 196 U/L / x     / x      CARDIAC MARKERS ( 21 Mar 2017 09:55 )  0.023 ng/mL / x     / 172 U/L / x     / x            Pro BNP  1025 03-21 @ 09:55  D Dimer  -- 03-21 @ 09:55    PT/INR - ( 21 Mar 2017 09:55 )   PT: 11.9 sec;   INR: 1.08 ratio         PTT - ( 21 Mar 2017 09:55 )  PTT:35.5 sec  INTERPRETATION:  Exam Date: 3/21/2017 7:33 AM    History: Back pain    Technique: Single frontal portable view of the chest with comparison to    10/20/2016    Findings:    Studies limited by rotation.    The heart is enlarged. No focal consolidation. The apices and   hemidiaphragms are unremarkable. Degenerative changes of the visualized   osseous structures.        Impression:    Cardiomegaly          ABG - ( 23 Mar 2017 11:07 )  pH: 7.40  /  pCO2: 35    /  pO2: 109   / HCO3: 21    / Base Excess: -3    /  SaO2: 99                RADIOLOGY & ADDITIONAL STUDIES:

## 2017-03-24 NOTE — PHYSICAL THERAPY INITIAL EVALUATION ADULT - ADDITIONAL COMMENTS
Pt. lives alone in a one level house w/ 2 KALEB w/ b/l HR. Pt. ambulates w/ a RW and wears 2L O2 24/7. Pt. is a household ambulator.

## 2017-03-24 NOTE — PROGRESS NOTE ADULT - ASSESSMENT
- ARYA/CKD  stage 4 (scr 2.5) due to volume depletion, will hold lasix for now and fu response to transfusion.  fu AM labs send,   - Anemia:  s/p egd and colonscopy, with ulcers.  likely source of bleed.  fu the trend of hgb.  would dc with hgb close to 9 with transfusion as needed.  - Hypernatremia: po fluid intake

## 2017-03-24 NOTE — PROGRESS NOTE ADULT - PROBLEM SELECTOR PLAN 2
serial H/H  Transfuse for hgb <7.0  Transfuse to hgb 9.0  Iron studies demonstrate decrease iron saturation - would start po Iron supplementation.

## 2017-03-24 NOTE — PROGRESS NOTE ADULT - ASSESSMENT
82 y/o male with PMHx of COPD, CHF, chronic renal failure, paroxysmal A-fib.with last hospitaliztion 10/2017 for hematochezia s/p bleeding scan and flex sigmoidoscopy that is admitted for low hgb likely secondary to GIB from gastric ulcer

## 2017-03-25 LAB
ANION GAP SERPL CALC-SCNC: 8 MMOL/L — SIGNIFICANT CHANGE UP (ref 5–17)
ANISOCYTOSIS BLD QL: SLIGHT — SIGNIFICANT CHANGE UP
BASO STIPL BLD QL SMEAR: SLIGHT — SIGNIFICANT CHANGE UP
BASOPHILS # BLD AUTO: 0.1 K/UL — SIGNIFICANT CHANGE UP (ref 0–0.2)
BASOPHILS NFR BLD AUTO: 0.9 % — SIGNIFICANT CHANGE UP (ref 0–2)
BLD GP AB SCN SERPL QL: SIGNIFICANT CHANGE UP
BUN SERPL-MCNC: 93 MG/DL — HIGH (ref 7–23)
CALCIUM SERPL-MCNC: 7.7 MG/DL — LOW (ref 8.5–10.1)
CHLORIDE SERPL-SCNC: 115 MMOL/L — HIGH (ref 96–108)
CO2 SERPL-SCNC: 25 MMOL/L — SIGNIFICANT CHANGE UP (ref 22–31)
CREAT SERPL-MCNC: 2.07 MG/DL — HIGH (ref 0.5–1.3)
ELLIPTOCYTES BLD QL SMEAR: SLIGHT — SIGNIFICANT CHANGE UP
EOSINOPHIL # BLD AUTO: 0.5 K/UL — SIGNIFICANT CHANGE UP (ref 0–0.5)
EOSINOPHIL NFR BLD AUTO: 5.8 % — SIGNIFICANT CHANGE UP (ref 0–6)
GLUCOSE SERPL-MCNC: 97 MG/DL — SIGNIFICANT CHANGE UP (ref 70–99)
HCT VFR BLD CALC: 21.3 % — LOW (ref 39–50)
HCT VFR BLD CALC: 23.1 % — LOW (ref 39–50)
HGB BLD-MCNC: 6.9 G/DL — CRITICAL LOW (ref 13–17)
HGB BLD-MCNC: 7.4 G/DL — LOW (ref 13–17)
HYPOCHROMIA BLD QL: SLIGHT — SIGNIFICANT CHANGE UP
LG PLATELETS BLD QL AUTO: SLIGHT — SIGNIFICANT CHANGE UP
LYMPHOCYTES # BLD AUTO: 0.4 K/UL — LOW (ref 1–3.3)
LYMPHOCYTES # BLD AUTO: 4.9 % — LOW (ref 13–44)
MACROCYTES BLD QL: SLIGHT — SIGNIFICANT CHANGE UP
MAGNESIUM SERPL-MCNC: 2.9 MG/DL — HIGH (ref 1.8–2.4)
MANUAL DIF COMMENT BLD-IMP: SIGNIFICANT CHANGE UP
MCHC RBC-ENTMCNC: 31.8 PG — SIGNIFICANT CHANGE UP (ref 27–34)
MCHC RBC-ENTMCNC: 32.6 GM/DL — SIGNIFICANT CHANGE UP (ref 32–36)
MCV RBC AUTO: 97.7 FL — SIGNIFICANT CHANGE UP (ref 80–100)
MICROCYTES BLD QL: SLIGHT — SIGNIFICANT CHANGE UP
MONOCYTES # BLD AUTO: 0.6 K/UL — SIGNIFICANT CHANGE UP (ref 0–0.9)
MONOCYTES NFR BLD AUTO: 7.8 % — SIGNIFICANT CHANGE UP (ref 2–14)
NEUTROPHILS # BLD AUTO: 6.5 K/UL — SIGNIFICANT CHANGE UP (ref 1.8–7.4)
NEUTROPHILS NFR BLD AUTO: 80.6 % — HIGH (ref 43–77)
PHOSPHATE SERPL-MCNC: 3 MG/DL — SIGNIFICANT CHANGE UP (ref 2.5–4.5)
PLAT MORPH BLD: NORMAL — SIGNIFICANT CHANGE UP
PLATELET # BLD AUTO: 215 K/UL — SIGNIFICANT CHANGE UP (ref 150–400)
POIKILOCYTOSIS BLD QL AUTO: SLIGHT — SIGNIFICANT CHANGE UP
POLYCHROMASIA BLD QL SMEAR: SLIGHT — SIGNIFICANT CHANGE UP
POTASSIUM SERPL-MCNC: 4.1 MMOL/L — SIGNIFICANT CHANGE UP (ref 3.5–5.3)
POTASSIUM SERPL-SCNC: 4.1 MMOL/L — SIGNIFICANT CHANGE UP (ref 3.5–5.3)
RBC # BLD: 2.18 M/UL — LOW (ref 4.2–5.8)
RBC # FLD: 19.5 % — HIGH (ref 10.3–14.5)
RBC BLD AUTO: (no result)
SODIUM SERPL-SCNC: 148 MMOL/L — HIGH (ref 135–145)
TYPE + AB SCN PNL BLD: SIGNIFICANT CHANGE UP
WBC # BLD: 8 K/UL — SIGNIFICANT CHANGE UP (ref 3.8–10.5)
WBC # FLD AUTO: 8 K/UL — SIGNIFICANT CHANGE UP (ref 3.8–10.5)

## 2017-03-25 RX ORDER — PANTOPRAZOLE SODIUM 20 MG/1
8 TABLET, DELAYED RELEASE ORAL
Qty: 80 | Refills: 0 | Status: DISCONTINUED | OUTPATIENT
Start: 2017-03-25 | End: 2017-04-03

## 2017-03-25 RX ADMIN — SIMVASTATIN 10 MILLIGRAM(S): 20 TABLET, FILM COATED ORAL at 21:52

## 2017-03-25 RX ADMIN — ISOSORBIDE MONONITRATE 120 MILLIGRAM(S): 60 TABLET, EXTENDED RELEASE ORAL at 11:17

## 2017-03-25 RX ADMIN — PRAMIPEXOLE DIHYDROCHLORIDE 0.12 MILLIGRAM(S): 0.12 TABLET ORAL at 15:00

## 2017-03-25 RX ADMIN — Medication 1 SUPPOSITORY(S): at 18:25

## 2017-03-25 RX ADMIN — Medication 1 APPLICATION(S): at 06:30

## 2017-03-25 RX ADMIN — Medication 650 MILLIGRAM(S): at 06:25

## 2017-03-25 RX ADMIN — PANTOPRAZOLE SODIUM 10 MG/HR: 20 TABLET, DELAYED RELEASE ORAL at 03:54

## 2017-03-25 RX ADMIN — Medication 150 MILLIGRAM(S): at 18:43

## 2017-03-25 RX ADMIN — FINASTERIDE 5 MILLIGRAM(S): 5 TABLET, FILM COATED ORAL at 11:15

## 2017-03-25 RX ADMIN — Medication 8 MILLIGRAM(S): at 21:51

## 2017-03-25 RX ADMIN — Medication 12.5 MILLIGRAM(S): at 18:43

## 2017-03-25 RX ADMIN — PANTOPRAZOLE SODIUM 10 MG/HR: 20 TABLET, DELAYED RELEASE ORAL at 15:00

## 2017-03-25 RX ADMIN — Medication 650 MILLIGRAM(S): at 11:14

## 2017-03-25 RX ADMIN — Medication 0.5 MILLIGRAM(S): at 08:38

## 2017-03-25 RX ADMIN — GABAPENTIN 600 MILLIGRAM(S): 400 CAPSULE ORAL at 21:52

## 2017-03-25 RX ADMIN — Medication 0.5 MILLIGRAM(S): at 20:51

## 2017-03-25 RX ADMIN — Medication 150 MILLIGRAM(S): at 06:28

## 2017-03-25 RX ADMIN — SENNA PLUS 2 TABLET(S): 8.6 TABLET ORAL at 21:52

## 2017-03-25 RX ADMIN — Medication 1 APPLICATION(S): at 18:25

## 2017-03-25 RX ADMIN — LORATADINE 10 MILLIGRAM(S): 10 TABLET ORAL at 11:17

## 2017-03-25 RX ADMIN — PRAMIPEXOLE DIHYDROCHLORIDE 0.12 MILLIGRAM(S): 0.12 TABLET ORAL at 06:26

## 2017-03-25 RX ADMIN — Medication 650 MILLIGRAM(S): at 18:26

## 2017-03-25 RX ADMIN — Medication 240 MILLIGRAM(S): at 06:29

## 2017-03-25 RX ADMIN — Medication 12.5 MILLIGRAM(S): at 06:28

## 2017-03-25 RX ADMIN — PRAMIPEXOLE DIHYDROCHLORIDE 0.12 MILLIGRAM(S): 0.12 TABLET ORAL at 21:51

## 2017-03-25 RX ADMIN — Medication 145 MILLIGRAM(S): at 11:15

## 2017-03-25 RX ADMIN — Medication 100 MILLIGRAM(S): at 11:16

## 2017-03-25 NOTE — PROGRESS NOTE ADULT - SUBJECTIVE AND OBJECTIVE BOX
NEPHROLOGY INTERVAL HPI/OVERNIGHT EVENTS:  GI and cardiology input noted.  will need to plan to get transfusion with IV lasix        MEDICATIONS  (STANDING):  finasteride 5milliGRAM(s) Oral daily  buDESOnide   0.5 milliGRAM(s) Respule 0.5milliGRAM(s) Nebulizer two times a day  doxazosin 8milliGRAM(s) Oral at bedtime  isosorbide   mononitrate ER Tablet (IMDUR) 120milliGRAM(s) Oral daily  gabapentin Oral Tab/Cap - Peds 600milliGRAM(s) Oral at bedtime  allopurinol 100milliGRAM(s) Oral daily  loratadine 10milliGRAM(s) Oral daily  fenofibrate Tablet 145milliGRAM(s) Oral daily  simvastatin 10milliGRAM(s) Oral at bedtime  pramipexole 0.125milliGRAM(s) Oral three times a day  metoprolol Oral Tab/Cap - Peds 12.5milliGRAM(s) Oral two times a day  hydrALAZINE 150milliGRAM(s) Oral two times a day  ammonium lactate 12% Lotion 1Application(s) Topical two times a day  diltiazem   CD 240milliGRAM(s) Oral daily  senna 2Tablet(s) Oral at bedtime  glycerin Suppository - Adult 1Suppository(s) Rectal daily  acetaminophen   Tablet 650milliGRAM(s) Oral every 6 hours  pantoprazole Infusion 8mG/Hr IV Continuous <Continuous>    MEDICATIONS  (PRN):  nitroglycerin     SubLingual 0.4milliGRAM(s) SubLingual every 5 minutes PRN Chest Pain  ondansetron Injectable 4milliGRAM(s) IV Push every 6 hours PRN Nausea and/or Vomiting  diphenhydrAMINE   Elixir 12.5milliGRAM(s) Oral every 6 hours PRN Rash and/or Itching      Allergies    No Known Allergies    Intolerances        I&O's Detail          Vital Signs Last 24 Hrs  T(C): 36.9, Max: 37.2 (03-24 @ 19:57)  T(F): 98.4, Max: 99 (03-24 @ 19:57)  HR: 77 (77 - 106)  BP: 160/41 (135/32 - 181/36)  BP(mean): --  RR: 16 (16 - 16)  SpO2: 100% (99% - 100%)  Daily     Daily     PHYSICAL EXAM:  General: alert. awake Ox3  HEENT: MMM  CV: s1s2 rrr  LUNGS: B/L CTA  EXT: LE dressing in place    LABS:                        6.9    8.0   )-----------( 215      ( 25 Mar 2017 06:08 )             21.3     25 Mar 2017 06:08    148    |  115    |  93     ----------------------------<  97     4.1     |  25     |  2.07     Ca    7.7        25 Mar 2017 06:08  Phos  3.0       25 Mar 2017 06:08  Mg     2.9       25 Mar 2017 06:08          Magnesium, Serum: 2.9 mg/dL (03-25 @ 06:08)  Phosphorus Level, Serum: 3.0 mg/dL (03-25 @ 06:08)

## 2017-03-25 NOTE — PROGRESS NOTE ADULT - SUBJECTIVE AND OBJECTIVE BOX
Patient is a 82y old  Male who presents with a chief complaint of left leg pain radiating from left buttoch and thigh (21 Mar 2017 06:35)      HPI:  pt feeling ok. no new complaints.  no significant brbpr some dark stool    PAST MEDICAL & SURGICAL HISTORY:  Sepsis, due to unspecified organism: 2/2 poorly healing wounds b/l  BPH (benign prostatic hypertrophy)  Hyperlipemia  Coronary artery disease  Hypertension  Dyspepsia: On moderate exertion.  Sleep apnea, obstructive: Requires home 02 therapy, and treatment with BIPAP  Atelectasis  Pleural effusion, bilateral  Respiratory failure  Peripheral edema  CRI (chronic renal insufficiency)  Gout  CRF (chronic renal failure)  Benign prostatic hypertrophy  Spinal stenosis  Hypercholesterolemia  GERD (gastroesophageal reflux disease)  CAD (coronary artery disease)  Hypertension  S/P angioplasty with stent  Cataract of left eye  Prostate: Surgery green light procedure.  S/P rotator cuff surgery: Right  S/P angioplasty  Rotator cuff tear, right: repair      MEDICATIONS  (STANDING):  finasteride 5milliGRAM(s) Oral daily  buDESOnide   0.5 milliGRAM(s) Respule 0.5milliGRAM(s) Nebulizer two times a day  doxazosin 8milliGRAM(s) Oral at bedtime  isosorbide   mononitrate ER Tablet (IMDUR) 120milliGRAM(s) Oral daily  gabapentin Oral Tab/Cap - Peds 600milliGRAM(s) Oral at bedtime  allopurinol 100milliGRAM(s) Oral daily  loratadine 10milliGRAM(s) Oral daily  fenofibrate Tablet 145milliGRAM(s) Oral daily  simvastatin 10milliGRAM(s) Oral at bedtime  pramipexole 0.125milliGRAM(s) Oral three times a day  metoprolol Oral Tab/Cap - Peds 12.5milliGRAM(s) Oral two times a day  hydrALAZINE 150milliGRAM(s) Oral two times a day  ammonium lactate 12% Lotion 1Application(s) Topical two times a day  diltiazem   CD 240milliGRAM(s) Oral daily  senna 2Tablet(s) Oral at bedtime  glycerin Suppository - Adult 1Suppository(s) Rectal daily  acetaminophen   Tablet 650milliGRAM(s) Oral every 6 hours  pantoprazole Infusion 8mG/Hr IV Continuous <Continuous>    MEDICATIONS  (PRN):  nitroglycerin     SubLingual 0.4milliGRAM(s) SubLingual every 5 minutes PRN Chest Pain  ondansetron Injectable 4milliGRAM(s) IV Push every 6 hours PRN Nausea and/or Vomiting  diphenhydrAMINE   Elixir 12.5milliGRAM(s) Oral every 6 hours PRN Rash and/or Itching      Allergies    No Known Allergies    REVIEW OF SYSTEMS:    CONSTITUTIONAL: No weakness, fevers or chills  RESPIRATORY: No cough, wheezing, hemoptysis; No shortness of breath  CARDIOVASCULAR: No chest pain or palpitations  GASTROINTESTINAL: No abdominal or epigastric pain. No nausea, vomiting, or hematemesis; No diarrhea or constipation. No melena or hematochezia.  All other review of systems is negative unless indicated above.    Vital Signs Last 24 Hrs  T(C): 36.9, Max: 37.2 (03-24 @ 19:57)  T(F): 98.4, Max: 99 (03-24 @ 19:57)  HR: 77 (77 - 106)  BP: 160/41 (135/32 - 181/36)  BP(mean): --  RR: 16 (16 - 17)  SpO2: 100% (99% - 100%)    PHYSICAL EXAM:    Constitutional: NAD, well-developed, obese  Respiratory: CTAB  Cardiovascular: S1 and S2, RRR,  Gastrointestinal: BS+, soft, NT/ND      LABS:                        6.9    8.0   )-----------( 215      ( 25 Mar 2017 06:08 )             21.3     25 Mar 2017 06:08    148    |  115    |  93     ----------------------------<  97     4.1     |  25     |  2.07     Ca    7.7        25 Mar 2017 06:08  Phos  3.0       25 Mar 2017 06:08  Mg     2.9       25 Mar 2017 06:08            RADIOLOGY & ADDITIONAL STUDIES:

## 2017-03-25 NOTE — PROGRESS NOTE ADULT - SUBJECTIVE AND OBJECTIVE BOX
CHIEF COMPLAINT:  GIB    SUBJECTIVE:   feeling ok. was oob to chair yesterday. awaiting lunch and very anxious for food. passing only dark stool.    REVIEW OF SYSTEMS:  CONSTITUTIONAL: No fevers or chills  RESPIRATORY: No cough, wheezing, hemoptysis; No shortness of breath  CARDIOVASCULAR: No chest pain or palpitations  All other review of systems is negative unless indicated above    Vital Signs Last 24 Hrs  T(C): 36.9, Max: 37.2 (03-24 @ 19:57)  T(F): 98.4, Max: 99 (03-24 @ 19:57)  HR: 77 (77 - 106)  BP: 160/41 (135/32 - 181/36)  BP(mean): --  RR: 16 (16 - 16)  SpO2: 100% (99% - 100%)      PHYSICAL EXAM:    Constitutional: NAD, awake and alert, well-developed  HEENT: PERR, EOMI, Normal Hearing, MMM  Neck: Soft and supple, No LAD, No JVD  Respiratory: Breath sounds are clear bilaterally, No wheezing, rales or rhonchi  Cardiovascular: S1 and S2, regular rate and rhythm, no Murmurs, gallops or rubs  Gastrointestinal: Bowel Sounds present, soft, increased girth due to habitus  Extremities: No peripheral edema  Neurological: A/O x 3, no focal deficits  Musculoskeletal: moves all extrem  Skin: No rashes    MEDICATIONS:  MEDICATIONS  (STANDING):  finasteride 5milliGRAM(s) Oral daily  buDESOnide   0.5 milliGRAM(s) Respule 0.5milliGRAM(s) Nebulizer two times a day  doxazosin 8milliGRAM(s) Oral at bedtime  isosorbide   mononitrate ER Tablet (IMDUR) 120milliGRAM(s) Oral daily  gabapentin Oral Tab/Cap - Peds 600milliGRAM(s) Oral at bedtime  allopurinol 100milliGRAM(s) Oral daily  loratadine 10milliGRAM(s) Oral daily  fenofibrate Tablet 145milliGRAM(s) Oral daily  simvastatin 10milliGRAM(s) Oral at bedtime  pramipexole 0.125milliGRAM(s) Oral three times a day  metoprolol Oral Tab/Cap - Peds 12.5milliGRAM(s) Oral two times a day  hydrALAZINE 150milliGRAM(s) Oral two times a day  ammonium lactate 12% Lotion 1Application(s) Topical two times a day  diltiazem   CD 240milliGRAM(s) Oral daily  senna 2Tablet(s) Oral at bedtime  glycerin Suppository - Adult 1Suppository(s) Rectal daily  acetaminophen   Tablet 650milliGRAM(s) Oral every 6 hours  pantoprazole Infusion 8mG/Hr IV Continuous <Continuous>      LABS: All Labs Reviewed:                        6.9    8.0   )-----------( 215      ( 25 Mar 2017 06:08 )             21.3     25 Mar 2017 06:08    148    |  115    |  93     ----------------------------<  97     4.1     |  25     |  2.07     Ca    7.7        25 Mar 2017 06:08  Phos  3.0       25 Mar 2017 06:08  Mg     2.9       25 Mar 2017 06:08            Blood Culture: 03-21 @ 03:41  Organism --  Gram Stain Blood -- Gram Stain --  Specimen Source .Blood None  Culture-Blood --    03-20 @ 22:38  Organism --  Gram Stain Blood -- Gram Stain --  Specimen Source .Blood None  Culture-Blood --      80 y/o male with PMHx of COPD, CHF, chronic renal failure, paroxysmal A-fib.with last hospitaliztion 10/2017 for hematochezia s/p bleeding scan and flex sigmoidoscopy  was BIBA for acute worsening lower back pain radiating to buttock to foot.  In Ed patient noted to have Hb 6 and stool guaiac positive  and rectal exam- with cookie  done by ED physician as per note.    Pt was admitted to telemetry and anemia was evaluated with EGD/colonscopy that revealed  non-bleeding ulcers (one with eschar) and colonic mass. He required 5 units PRBCs to maintain Hb>7.     Anticipate discharge mid next week with repeat colonoscopy and stable Hb.      Assessment and Plan:   		  80 y/o male with PMHx of COPD, CHF, chronic renal failure, paroxysmal A-fib.with last hospitaliztion 10/2017 for hematochezia s/p bleeding scan and flex sigmoidoscopy that is admitted for low hgb likely secondary to GIB from gastric ulcer.      Problem/Plan - 1: Acute Blood Loss Anemia due to UGIB associated with Gastric Ulcer  -Hb 6.3 on arrival, s/p 5 units PRBCs per nursing. Hb 6.9 today, so additional unit ordered  -EGD:4 non-bleeding cratered ulcers fundus/curvature of gastric body, the largest 15mm in dimension with eschar not amenable to intervention  -Colon: nonobstructing 3 cmx 7cm mass in transverse colon, no bleeding. limited prep  -Protonix gtt, to continue through Monday per GI  -Clear diet for now  -possible repeat colonoscopy.   -hold iron given confounding dark stool and iron load with PRBCs (iron low per labs)    Problem/Plan - 2: SHAYY with chronic hypoxic respiratory failure  -Bipap while asleep      Problem/Plan - 3: Coronary artery disease   -BBlockers, fibrates, statin  -no ASA due to bleeding    Problem/Plan - 4: ARYA on CKD IV   -cr 3.5 on arrival, now 2.07  -holding nephrotoxic agents  -renal also following    Problem/Plan - 5: Essential hypertension  -c/w current management BP stable over course of admission.     Problem/Plan - 6:  Problem: ARYA (acute kidney injury). Plan: ARB discontinued with improvement of Cr.  Continue to monitor and continue to hold diuresis.    Problem/Plan - 7: Spinal stenosis  -Gabapentin  -PT eval for lower extremity weakness      Problem/Plan - 8:Morbid Obesity  -BMI 37 by chart, but likely he needs accurate weight recorded  -high hip: waste ratio  -nutrition consult when diet advanced

## 2017-03-25 NOTE — PROGRESS NOTE ADULT - ASSESSMENT
1. Anemia- Management pre primary team and GI team. GI following- s/p EGD/colon- non bleeding ulcers. WIll continue to hold antiplatelet therapy and any anticoagulation.Continue cardizem periop as tolerated by the HR and BP. Hgb today 6.3- will need transfusion today followed by a dose of IV lasix. Will need GI f/u today.     2. CAD, s/p PCI and atherectomy- pt is off anticoagulation and antiplatelet agents. Benefits outweigh risks with antiplatelet agents now.  Continue statin.     3. HTN- continue current meds.    4. Afib- in sinus rythm.  Not on anticoagulation at this time. Per pulmonary team pt is not an optimal candidate for amiodarone since he has already low lung reserve.    5. Mechanical DVT proph.

## 2017-03-25 NOTE — PROGRESS NOTE ADULT - ASSESSMENT
gi bleed secondary to gastric ulcers/erosion not amenable to endoscopic therapy  continue protonix drip for now  transfuse today given hgb 6.9  this likely represents equilibration and unclear if significant ongoing  maintain on clear liquids for now  d/w dr mason

## 2017-03-25 NOTE — PROGRESS NOTE ADULT - ASSESSMENT
- ARYA/CKD  stage 4 (scr 2.5) due to volume depletion, will hold lasix for now and fu response to transfusion.  fu AM labs send,   - Anemia:  s/p egd and colonscopy, with ulcers.  likely source of bleed.  fu the trend of hgb.  would dc with hgb close to 9 with transfusion as needed.  - Hypernatremia: po fluid intake    3/25 MK  - CKD stage 4: stable.  hold lasix standing dose and only prn as needed  - Hypernatremia: po fluid intake  - GI bleed: transfusion with lasix iv x1

## 2017-03-25 NOTE — PROGRESS NOTE ADULT - SUBJECTIVE AND OBJECTIVE BOX
Cardiology Progress Note:    HPI:  81 y/o male with PMHx of COPD, CHF, chronic renal failure, paroxysmal A-fib.with last hospitaliztion 10/2017 for hematochezia s/p bleeding scan and flex sigmoidoscopy  was BIBA for acute worsening lower back pain radiating to buttock to foot. Patient with amber boots for chronic venous stasis ulcers and lower extremity chronic edema ,daughter reports increased oozing from ulcers in the past few weeks, no fever.     3/25- No CP/SOB. No fevers. SR on tele.    Family History: Father: rectal CA  Mother: HTN, DM  3 siblings: DM    Social History: ::  Tobacco: smoked 1ppd X 50 years, quit 22 years ago.  Rare ETOH use.  wife recently passed away last year (21 Mar 2017 06:35)    PAST MEDICAL & SURGICAL HISTORY:  Sepsis, due to unspecified organism: 2/2 poorly healing wounds b/l  BPH (benign prostatic hypertrophy)  Hyperlipemia  Coronary artery disease  Hypertension  Dyspepsia: On moderate exertion.  Sleep apnea, obstructive: Requires home 02 therapy, and treatment with BIPAP  Atelectasis  Pleural effusion, bilateral  Respiratory failure  Peripheral edema  CRI (chronic renal insufficiency)  Gout  CRF (chronic renal failure)  Benign prostatic hypertrophy  Spinal stenosis  Hypercholesterolemia  GERD (gastroesophageal reflux disease)  CAD (coronary artery disease)  Hypertension  S/P angioplasty with stent  Cataract of left eye  Prostate: Surgery green light procedure.  S/P rotator cuff surgery: Right  S/P angioplasty  Rotator cuff tear, right: repair    MEDICATIONS  (STANDING):  pantoprazole Infusion 8mG/Hr IV Continuous <Continuous>  finasteride 5milliGRAM(s) Oral daily  buDESOnide   0.5 milliGRAM(s) Respule 0.5milliGRAM(s) Nebulizer two times a day  aspirin enteric coated 81milliGRAM(s) Oral daily  doxazosin 8milliGRAM(s) Oral at bedtime  isosorbide   mononitrate ER Tablet (IMDUR) 120milliGRAM(s) Oral daily  diltiazem    Tablet 120milliGRAM(s) Oral two times a day  gabapentin Oral Tab/Cap - Peds 600milliGRAM(s) Oral at bedtime  gabapentin 300milliGRAM(s) Oral daily  allopurinol 100milliGRAM(s) Oral daily  loratadine 10milliGRAM(s) Oral daily  fenofibrate Tablet 145milliGRAM(s) Oral daily  simvastatin 10milliGRAM(s) Oral at bedtime  pramipexole 0.125milliGRAM(s) Oral three times a day  metoprolol Oral Tab/Cap - Peds 12.5milliGRAM(s) Oral two times a day  famotidine    Tablet 20milliGRAM(s) Oral at bedtime  ferrous    sulfate 325milliGRAM(s) Oral three times a day with meals  hydrALAZINE 150milliGRAM(s) Oral two times a day  pantoprazole    Tablet 40milliGRAM(s) Oral two times a day before meals  ammonium lactate 12% Lotion 1Application(s) Topical two times a day  heparin  Injectable 5000Unit(s) SubCutaneous every 8 hours    MEDICATIONS  (PRN):  nitroglycerin     SubLingual 0.4milliGRAM(s) SubLingual every 5 minutes PRN Chest Pain    Allergies  No Known Allergies    Intolerances    Vital Signs Last 24 Hrs  T(C): 36.6, Max: 36.7 (03-20 @ 22:39)  T(F): 97.9, Max: 98.1 (03-20 @ 22:39)  HR: 95 (90 - 112)  BP: 121/53 (121/53 - 140/63)  BP(mean): --  RR: 18 (16 - 18)  SpO2: 93% (93% - 100%)    REVIEW OF SYSTEMS:    CONSTITUTIONAL:  As per HPI.  HEENT:  Eyes:  No diplopia or blurred vision. ENT:  No earache, sore throat or runny nose.  CARDIOVASCULAR:  No pressure, squeezing, strangling, tightness, heaviness or aching about the chest, neck, axilla or epigastrium.  RESPIRATORY:  No cough, shortness of breath, PND or orthopnea.  GASTROINTESTINAL:  No nausea, vomiting or diarrhea.  GENITOURINARY:  No dysuria, frequency or urgency.  MUSCULOSKELETAL:  As per HPI.  SKIN:  No change in skin, hair or nails.  NEUROLOGIC:  No paresthesias, fasciculations, seizures or weakness.  PSYCHIATRIC:  No disorder of thought or mood.  ENDOCRINE:  No heat or cold intolerance, polyuria or polydipsia.  HEMATOLOGICAL:  No easy bruising or bleedings:  .     PHYSICAL EXAMINATION:    GENERAL APPEARANCE:  Pt. is not currently dyspneic, in no distress. Pt. is alert, oriented, and pleasant.  HEENT:  Pupils are normal and react normally. No icterus. Mucous membranes well colored.  NECK:  Supple. No lymphadenopathy. Jugular venous pressure not elevated. Carotids equal.   HEART:   The cardiac impulse has a normal quality. There are no murmurs, rubs or gallops noted  CHEST:  Chest is clear to auscultation. Normal respiratory effort.  ABDOMEN:  Soft and nontender.   EXTREMITIES:  There is no edema.   SKIN:  No rash or significant lesions are noted.    I&O's Summary    LABS:                        6.3    8.6   )-----------( 337      ( 20 Mar 2017 22:39 )             20.0   20 Mar 2017 22:39    147    |  114    |  114    ----------------------------<  124    4.4     |  23     |  3.51     Ca    8.1        20 Mar 2017 22:39    TPro  5.7    /  Alb  2.5    /  TBili  0.2    /  DBili  x      /  AST  19     /  ALT  14     /  AlkPhos  34     20 Mar 2017 22:39  LIVER FUNCTIONS - ( 20 Mar 2017 22:39 )  Alb: 2.5 g/dL / Pro: 5.7 gm/dL / ALK PHOS: 34 U/L / ALT: 14 U/L / AST: 19 U/L / GGT: x           PT/INR - ( 20 Mar 2017 22:39 )   PT: 11.3 sec;   INR: 1.03 ratio       PTT - ( 20 Mar 2017 22:39 )  PTT:33.6 secCARDIAC MARKERS ( 21 Mar 2017 03:42 )  0.018 ng/mL / x     / 107 U/L / x     / x        TELEMETRY: Normal sinus rhythm with no tachy or nelly events    RADIOLOGY- CXR- 3/23- [facility].    ASSESSMENT/PLAN-

## 2017-03-26 LAB
CULTURE RESULTS: SIGNIFICANT CHANGE UP
CULTURE RESULTS: SIGNIFICANT CHANGE UP
HCT VFR BLD CALC: 24 % — LOW (ref 39–50)
HGB BLD-MCNC: 7.8 G/DL — LOW (ref 13–17)
MCHC RBC-ENTMCNC: 30.4 PG — SIGNIFICANT CHANGE UP (ref 27–34)
MCHC RBC-ENTMCNC: 32.5 GM/DL — SIGNIFICANT CHANGE UP (ref 32–36)
MCV RBC AUTO: 93.5 FL — SIGNIFICANT CHANGE UP (ref 80–100)
PLATELET # BLD AUTO: 208 K/UL — SIGNIFICANT CHANGE UP (ref 150–400)
RBC # BLD: 2.56 M/UL — LOW (ref 4.2–5.8)
RBC # FLD: 18.3 % — HIGH (ref 10.3–14.5)
SPECIMEN SOURCE: SIGNIFICANT CHANGE UP
SPECIMEN SOURCE: SIGNIFICANT CHANGE UP
WBC # BLD: 8.9 K/UL — SIGNIFICANT CHANGE UP (ref 3.8–10.5)
WBC # FLD AUTO: 8.9 K/UL — SIGNIFICANT CHANGE UP (ref 3.8–10.5)

## 2017-03-26 RX ADMIN — Medication 240 MILLIGRAM(S): at 05:57

## 2017-03-26 RX ADMIN — Medication 150 MILLIGRAM(S): at 05:56

## 2017-03-26 RX ADMIN — GABAPENTIN 600 MILLIGRAM(S): 400 CAPSULE ORAL at 22:57

## 2017-03-26 RX ADMIN — Medication 0.5 MILLIGRAM(S): at 08:14

## 2017-03-26 RX ADMIN — ISOSORBIDE MONONITRATE 120 MILLIGRAM(S): 60 TABLET, EXTENDED RELEASE ORAL at 11:49

## 2017-03-26 RX ADMIN — PRAMIPEXOLE DIHYDROCHLORIDE 0.12 MILLIGRAM(S): 0.12 TABLET ORAL at 15:42

## 2017-03-26 RX ADMIN — Medication 0.5 MILLIGRAM(S): at 19:38

## 2017-03-26 RX ADMIN — LORATADINE 10 MILLIGRAM(S): 10 TABLET ORAL at 11:49

## 2017-03-26 RX ADMIN — FINASTERIDE 5 MILLIGRAM(S): 5 TABLET, FILM COATED ORAL at 11:49

## 2017-03-26 RX ADMIN — PRAMIPEXOLE DIHYDROCHLORIDE 0.12 MILLIGRAM(S): 0.12 TABLET ORAL at 05:58

## 2017-03-26 RX ADMIN — Medication 1 APPLICATION(S): at 18:30

## 2017-03-26 RX ADMIN — PANTOPRAZOLE SODIUM 10 MG/HR: 20 TABLET, DELAYED RELEASE ORAL at 16:37

## 2017-03-26 RX ADMIN — Medication 650 MILLIGRAM(S): at 00:45

## 2017-03-26 RX ADMIN — SENNA PLUS 2 TABLET(S): 8.6 TABLET ORAL at 22:55

## 2017-03-26 RX ADMIN — Medication 650 MILLIGRAM(S): at 23:00

## 2017-03-26 RX ADMIN — Medication 145 MILLIGRAM(S): at 11:49

## 2017-03-26 RX ADMIN — Medication 12.5 MILLIGRAM(S): at 05:57

## 2017-03-26 RX ADMIN — Medication 150 MILLIGRAM(S): at 18:38

## 2017-03-26 RX ADMIN — Medication 1 APPLICATION(S): at 06:00

## 2017-03-26 RX ADMIN — Medication 100 MILLIGRAM(S): at 11:49

## 2017-03-26 RX ADMIN — Medication 8 MILLIGRAM(S): at 22:56

## 2017-03-26 RX ADMIN — PRAMIPEXOLE DIHYDROCHLORIDE 0.12 MILLIGRAM(S): 0.12 TABLET ORAL at 22:56

## 2017-03-26 RX ADMIN — Medication 12.5 MILLIGRAM(S): at 18:39

## 2017-03-26 RX ADMIN — Medication 1 SUPPOSITORY(S): at 15:43

## 2017-03-26 RX ADMIN — SIMVASTATIN 10 MILLIGRAM(S): 20 TABLET, FILM COATED ORAL at 22:57

## 2017-03-26 NOTE — PROGRESS NOTE ADULT - SUBJECTIVE AND OBJECTIVE BOX
NEPHROLOGY INTERVAL HPI/OVERNIGHT EVENTS:  no c/o resting comfortably    MEDICATIONS  (STANDING):  finasteride 5milliGRAM(s) Oral daily  buDESOnide   0.5 milliGRAM(s) Respule 0.5milliGRAM(s) Nebulizer two times a day  doxazosin 8milliGRAM(s) Oral at bedtime  isosorbide   mononitrate ER Tablet (IMDUR) 120milliGRAM(s) Oral daily  gabapentin Oral Tab/Cap - Peds 600milliGRAM(s) Oral at bedtime  allopurinol 100milliGRAM(s) Oral daily  loratadine 10milliGRAM(s) Oral daily  fenofibrate Tablet 145milliGRAM(s) Oral daily  simvastatin 10milliGRAM(s) Oral at bedtime  pramipexole 0.125milliGRAM(s) Oral three times a day  metoprolol Oral Tab/Cap - Peds 12.5milliGRAM(s) Oral two times a day  hydrALAZINE 150milliGRAM(s) Oral two times a day  ammonium lactate 12% Lotion 1Application(s) Topical two times a day  diltiazem   CD 240milliGRAM(s) Oral daily  senna 2Tablet(s) Oral at bedtime  glycerin Suppository - Adult 1Suppository(s) Rectal daily  acetaminophen   Tablet 650milliGRAM(s) Oral every 6 hours  pantoprazole Infusion 8mG/Hr IV Continuous <Continuous>    MEDICATIONS  (PRN):  nitroglycerin     SubLingual 0.4milliGRAM(s) SubLingual every 5 minutes PRN Chest Pain  ondansetron Injectable 4milliGRAM(s) IV Push every 6 hours PRN Nausea and/or Vomiting  diphenhydrAMINE   Elixir 12.5milliGRAM(s) Oral every 6 hours PRN Rash and/or Itching      Allergies    No Known Allergies    Intolerances        I&O's Detail    I & Os for current day (as of 26 Mar 2017 14:44)  =============================================  IN:    Packed Red Blood Cells: 326 ml    Oral Fluid: 240 ml    Total IN: 566 ml  ---------------------------------------------  OUT:    Voided: 400 ml    Total OUT: 400 ml  ---------------------------------------------  Total NET: 166 ml        Vital Signs Last 24 Hrs  T(C): 37.2, Max: 37.8 (03-26 @ 01:24)  T(F): 99, Max: 100 (03-26 @ 01:24)  HR: 79 (76 - 88)  BP: 127/37 (127/37 - 152/47)  BP(mean): --  RR: 17 (16 - 18)  SpO2: 99% (96% - 100%)  Daily     Daily     PHYSICAL EXAM:  General: alert. awake Ox3  HEENT: MMM  CV: s1s2 rrr  LUNGS: B/L CTA  EXT: dressing in place    LABS:                        7.8    8.9   )-----------( 208      ( 26 Mar 2017 11:24 )             24.0     25 Mar 2017 06:08    148    |  115    |  93     ----------------------------<  97     4.1     |  25     |  2.07     Ca    7.7        25 Mar 2017 06:08  Phos  3.0       25 Mar 2017 06:08  Mg     2.9       25 Mar 2017 06:08

## 2017-03-26 NOTE — PROGRESS NOTE ADULT - ASSESSMENT
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 Hct is stable, but got blood yesterday. Suspect chronic blood loss from ulcer. Will advance diet today. Needs close monitoring.

## 2017-03-26 NOTE — PROGRESS NOTE ADULT - ASSESSMENT
1. Anemia- Management pre primary team and GI team. GI following- s/p EGD/colon- large gastric ulcers. WIll continue to hold antiplatelet therapy and any anticoagulation.Continue cardizem as tolerated by the HR and BP. Hgb today7.4- continue to follow CBC closely and transfuse as needed. Will need GI f/u.     2. CAD, s/p PCI and atherectomy- pt is off anticoagulation and antiplatelet agents. Benefits outweigh risks with antiplatelet agents now.  Continue statin.     3. HTN- continue current meds.    4. Afib- in sinus rythm.  Not on anticoagulation at this time. Per pulmonary team pt is not an optimal candidate for amiodarone since he has already low lung reserve.    5. Mechanical DVT proph.

## 2017-03-26 NOTE — PROGRESS NOTE ADULT - SUBJECTIVE AND OBJECTIVE BOX
Cardiology Progress Note:    HPI:  81 y/o male with PMHx of COPD, CHF, chronic renal failure, paroxysmal A-fib.with last hospitaliztion 10/2017 for hematochezia s/p bleeding scan and flex sigmoidoscopy  was BIBA for acute worsening lower back pain radiating to buttock to foot. Patient with amber boots for chronic venous stasis ulcers and lower extremity chronic edema ,daughter reports increased oozing from ulcers in the past few weeks, no fever.     3/25- No CP/SOB. No fevers. SR on tele.    3/26- Pt transfused another unit pRBCs yesterday- hgb 7.4 now. No CP/SOB. SR on tele.     Family History: Father: rectal CA  Mother: HTN, DM  3 siblings: DM    Social History: ::  Tobacco: smoked 1ppd X 50 years, quit 22 years ago.  Rare ETOH use.  wife recently passed away last year (21 Mar 2017 06:35)    PAST MEDICAL & SURGICAL HISTORY:  Sepsis, due to unspecified organism: 2/2 poorly healing wounds b/l  BPH (benign prostatic hypertrophy)  Hyperlipemia  Coronary artery disease  Hypertension  Dyspepsia: On moderate exertion.  Sleep apnea, obstructive: Requires home 02 therapy, and treatment with BIPAP  Atelectasis  Pleural effusion, bilateral  Respiratory failure  Peripheral edema  CRI (chronic renal insufficiency)  Gout  CRF (chronic renal failure)  Benign prostatic hypertrophy  Spinal stenosis  Hypercholesterolemia  GERD (gastroesophageal reflux disease)  CAD (coronary artery disease)  Hypertension  S/P angioplasty with stent  Cataract of left eye  Prostate: Surgery green light procedure.  S/P rotator cuff surgery: Right  S/P angioplasty  Rotator cuff tear, right: repair    MEDICATIONS  (STANDING):  pantoprazole Infusion 8mG/Hr IV Continuous <Continuous>  finasteride 5milliGRAM(s) Oral daily  buDESOnide   0.5 milliGRAM(s) Respule 0.5milliGRAM(s) Nebulizer two times a day  aspirin enteric coated 81milliGRAM(s) Oral daily  doxazosin 8milliGRAM(s) Oral at bedtime  isosorbide   mononitrate ER Tablet (IMDUR) 120milliGRAM(s) Oral daily  diltiazem    Tablet 120milliGRAM(s) Oral two times a day  gabapentin Oral Tab/Cap - Peds 600milliGRAM(s) Oral at bedtime  gabapentin 300milliGRAM(s) Oral daily  allopurinol 100milliGRAM(s) Oral daily  loratadine 10milliGRAM(s) Oral daily  fenofibrate Tablet 145milliGRAM(s) Oral daily  simvastatin 10milliGRAM(s) Oral at bedtime  pramipexole 0.125milliGRAM(s) Oral three times a day  metoprolol Oral Tab/Cap - Peds 12.5milliGRAM(s) Oral two times a day  famotidine    Tablet 20milliGRAM(s) Oral at bedtime  ferrous    sulfate 325milliGRAM(s) Oral three times a day with meals  hydrALAZINE 150milliGRAM(s) Oral two times a day  pantoprazole    Tablet 40milliGRAM(s) Oral two times a day before meals  ammonium lactate 12% Lotion 1Application(s) Topical two times a day  heparin  Injectable 5000Unit(s) SubCutaneous every 8 hours    MEDICATIONS  (PRN):  nitroglycerin     SubLingual 0.4milliGRAM(s) SubLingual every 5 minutes PRN Chest Pain    Allergies  No Known Allergies    Intolerances    Vital Signs Last 24 Hrs  T(C): 36.6, Max: 36.7 (03-20 @ 22:39)  T(F): 97.9, Max: 98.1 (03-20 @ 22:39)  HR: 95 (90 - 112)  BP: 121/53 (121/53 - 140/63)  BP(mean): --  RR: 18 (16 - 18)  SpO2: 93% (93% - 100%)    REVIEW OF SYSTEMS:    CONSTITUTIONAL:  As per HPI.  HEENT:  Eyes:  No diplopia or blurred vision. ENT:  No earache, sore throat or runny nose.  CARDIOVASCULAR:  No pressure, squeezing, strangling, tightness, heaviness or aching about the chest, neck, axilla or epigastrium.  RESPIRATORY:  No cough, shortness of breath, PND or orthopnea.  GASTROINTESTINAL:  No nausea, vomiting or diarrhea.  GENITOURINARY:  No dysuria, frequency or urgency.  MUSCULOSKELETAL:  As per HPI.  SKIN:  No change in skin, hair or nails.  NEUROLOGIC:  No paresthesias, fasciculations, seizures or weakness.  PSYCHIATRIC:  No disorder of thought or mood.  ENDOCRINE:  No heat or cold intolerance, polyuria or polydipsia.  HEMATOLOGICAL:  No easy bruising or bleedings:  .     PHYSICAL EXAMINATION:    GENERAL APPEARANCE:  Pt. is not currently dyspneic, in no distress. Pt. is alert, oriented, and pleasant.  HEENT:  Pupils are normal and react normally. No icterus. Mucous membranes well colored.  NECK:  Supple. No lymphadenopathy. Jugular venous pressure not elevated. Carotids equal.   HEART:   The cardiac impulse has a normal quality. There are no murmurs, rubs or gallops noted  CHEST:  Chest is clear to auscultation. Normal respiratory effort.  ABDOMEN:  Soft and nontender.   EXTREMITIES:  There is no edema.   SKIN:  No rash or significant lesions are noted.    I&O's Summary    LABS:                        6.3    8.6   )-----------( 337      ( 20 Mar 2017 22:39 )             20.0   20 Mar 2017 22:39    147    |  114    |  114    ----------------------------<  124    4.4     |  23     |  3.51     Ca    8.1        20 Mar 2017 22:39    TPro  5.7    /  Alb  2.5    /  TBili  0.2    /  DBili  x      /  AST  19     /  ALT  14     /  AlkPhos  34     20 Mar 2017 22:39  LIVER FUNCTIONS - ( 20 Mar 2017 22:39 )  Alb: 2.5 g/dL / Pro: 5.7 gm/dL / ALK PHOS: 34 U/L / ALT: 14 U/L / AST: 19 U/L / GGT: x           PT/INR - ( 20 Mar 2017 22:39 )   PT: 11.3 sec;   INR: 1.03 ratio       PTT - ( 20 Mar 2017 22:39 )  PTT:33.6 secCARDIAC MARKERS ( 21 Mar 2017 03:42 )  0.018 ng/mL / x     / 107 U/L / x     / x        TELEMETRY: Normal sinus rhythm with no tachy or nelly events    RADIOLOGY- CXR- 3/23- [facility]    ASSESSMENT/PLAN-

## 2017-03-26 NOTE — PROGRESS NOTE ADULT - ASSESSMENT
- RAYA/CKD  stage 4 (scr 2.5) due to volume depletion, will hold lasix for now and fu response to transfusion.  fu AM labs send,   - Anemia:  s/p egd and colonscopy, with ulcers.  likely source of bleed.  fu the trend of hgb.  would dc with hgb close to 9 with transfusion as needed.  - Hypernatremia: po fluid intake    3/25 MK  - CKD stage 4: stable.  hold lasix standing dose and only prn as needed  - Hypernatremia: po fluid intake  - GI bleed: transfusion with lasix iv x1    3/26 MK  - CKD stage 4 with mild hypernatremia: hold iv lasix and encourage moderate po fluid intake.    - GI bleed: fu trend of the hgb.  on protonix drip.

## 2017-03-26 NOTE — PROGRESS NOTE ADULT - SUBJECTIVE AND OBJECTIVE BOX
Patient is a 82y old  Male who presents with a chief complaint of left leg pain radiating from left buttoch and thigh (21 Mar 2017 06:35)      HPI:  HPI: :: 82 y/o male with PMHx of COPD, CHF, chronic renal failure, paroxysmal A-fib.with last hospitaliztion 10/2017 for hematochezia s/p bleeding scan and flex sigmoidoscopy  was BIBA for acute worsening lower back pain radiating to buttock to foot   patient with amber boots for chronic venous stasis ulcers and lower extremity chronic edema ,daughter reports increased oozing from ulcers in lai past few weeks,no fever  In Ed patient noted to have Hb 6 and stool guaiac positive  and rectal exam- with cookie  done by ED physician as per note   Patient denies any chest pain or worsening of SOB ,no abdominal pain or vomiting ,no diarrhea,no efver or chills      Got transfused yesterday. No complaints of bleeding or abdominal pain.    PMHx: ::  HTN,  COPD on home O2 2L,  SHAYY on nocturnal BIPAP,  Chronic diastolic CHF,  CAD s/p PCI with stent in 2002,  Last cath in july 2014 with RCA disease medically managed, Hyperlipidemia,  PVD,  Iron deficiency anemia,  Chronic back pain,  Gout,  BPH,  CKD III with baseline Cr 2.2,  PAF not on a/c,  Hx of GI Bleed in the past, hemorrhoids s/p banding,  PSHx: ::  Right rotator cuff repair  pilonidal cyst  Family History: ::  Father: rectal CA  Mother: HTN, DM  3 siblings: DM  Social History: ::  Tobacco: smoked 1ppd X 50 years, quit 22 years ago.  Rare ETOH use.  wife recently passed away last year (21 Mar 2017 06:35)      PAST MEDICAL & SURGICAL HISTORY:  Sepsis, due to unspecified organism: 2/2 poorly healing wounds b/l  BPH (benign prostatic hypertrophy)  Hyperlipemia  Coronary artery disease  Hypertension  Dyspepsia: On moderate exertion.  Sleep apnea, obstructive: Requires home 02 therapy, and treatment with BIPAP  Atelectasis  Pleural effusion, bilateral  Respiratory failure  Peripheral edema  CRI (chronic renal insufficiency)  Gout  CRF (chronic renal failure)  Benign prostatic hypertrophy  Spinal stenosis  Hypercholesterolemia  GERD (gastroesophageal reflux disease)  CAD (coronary artery disease)  Hypertension  S/P angioplasty with stent  Cataract of left eye  Prostate: Surgery green light procedure.  S/P rotator cuff surgery: Right  S/P angioplasty  Rotator cuff tear, right: repair      MEDICATIONS  (STANDING):  finasteride 5milliGRAM(s) Oral daily  buDESOnide   0.5 milliGRAM(s) Respule 0.5milliGRAM(s) Nebulizer two times a day  doxazosin 8milliGRAM(s) Oral at bedtime  isosorbide   mononitrate ER Tablet (IMDUR) 120milliGRAM(s) Oral daily  gabapentin Oral Tab/Cap - Peds 600milliGRAM(s) Oral at bedtime  allopurinol 100milliGRAM(s) Oral daily  loratadine 10milliGRAM(s) Oral daily  fenofibrate Tablet 145milliGRAM(s) Oral daily  simvastatin 10milliGRAM(s) Oral at bedtime  pramipexole 0.125milliGRAM(s) Oral three times a day  metoprolol Oral Tab/Cap - Peds 12.5milliGRAM(s) Oral two times a day  hydrALAZINE 150milliGRAM(s) Oral two times a day  ammonium lactate 12% Lotion 1Application(s) Topical two times a day  diltiazem   CD 240milliGRAM(s) Oral daily  senna 2Tablet(s) Oral at bedtime  glycerin Suppository - Adult 1Suppository(s) Rectal daily  acetaminophen   Tablet 650milliGRAM(s) Oral every 6 hours  pantoprazole Infusion 8mG/Hr IV Continuous <Continuous>    MEDICATIONS  (PRN):  nitroglycerin     SubLingual 0.4milliGRAM(s) SubLingual every 5 minutes PRN Chest Pain  ondansetron Injectable 4milliGRAM(s) IV Push every 6 hours PRN Nausea and/or Vomiting  diphenhydrAMINE   Elixir 12.5milliGRAM(s) Oral every 6 hours PRN Rash and/or Itching      Allergies    No Known Allergies    Intolerances        REVIEW OF SYSTEMS:    CONSTITUTIONAL: No weakness, fevers or chills  RESPIRATORY: No cough, wheezing, hemoptysis; No shortness of breath  CARDIOVASCULAR: No chest pain or palpitations  GASTROINTESTINAL: No abdominal or epigastric pain. No nausea, vomiting, or hematemesis; No diarrhea or constipation. No melena or hematochezia.  All other review of systems is negative unless indicated above.    Vital Signs Last 24 Hrs  T(C): 37.1, Max: 37.8 (03-26 @ 01:24)  T(F): 98.8, Max: 100 (03-26 @ 01:24)  HR: 77 (76 - 88)  BP: 152/47 (129/51 - 160/41)  BP(mean): --  RR: 18 (16 - 18)  SpO2: 97% (96% - 100%)    PHYSICAL EXAM:    Constitutional: NAD, well-developed  Respiratory: bilateral rhonchi  Cardiovascular: S1 and S2, RRR, no M/G/R  Gastrointestinal: BS+, soft, NT/ND  Extremities: No peripheral edema  LABS:                        7.4    x     )-----------( x        ( 25 Mar 2017 22:02 )             23.1     25 Mar 2017 06:08    148    |  115    |  93     ----------------------------<  97     4.1     |  25     |  2.07     Ca    7.7        25 Mar 2017 06:08  Phos  3.0       25 Mar 2017 06:08  Mg     2.9       25 Mar 2017 06:08            RADIOLOGY & ADDITIONAL STUDIES:

## 2017-03-27 LAB
ANION GAP SERPL CALC-SCNC: 11 MMOL/L — SIGNIFICANT CHANGE UP (ref 5–17)
BUN SERPL-MCNC: 77 MG/DL — HIGH (ref 7–23)
CALCIUM SERPL-MCNC: 8 MG/DL — LOW (ref 8.5–10.1)
CHLORIDE SERPL-SCNC: 117 MMOL/L — HIGH (ref 96–108)
CO2 SERPL-SCNC: 23 MMOL/L — SIGNIFICANT CHANGE UP (ref 22–31)
CREAT SERPL-MCNC: 1.87 MG/DL — HIGH (ref 0.5–1.3)
GLUCOSE SERPL-MCNC: 110 MG/DL — HIGH (ref 70–99)
HCT VFR BLD CALC: 24 % — LOW (ref 39–50)
HGB BLD-MCNC: 7.7 G/DL — LOW (ref 13–17)
MCHC RBC-ENTMCNC: 30.5 PG — SIGNIFICANT CHANGE UP (ref 27–34)
MCHC RBC-ENTMCNC: 32 GM/DL — SIGNIFICANT CHANGE UP (ref 32–36)
MCV RBC AUTO: 95.3 FL — SIGNIFICANT CHANGE UP (ref 80–100)
PLATELET # BLD AUTO: 239 K/UL — SIGNIFICANT CHANGE UP (ref 150–400)
POTASSIUM SERPL-MCNC: 4.1 MMOL/L — SIGNIFICANT CHANGE UP (ref 3.5–5.3)
POTASSIUM SERPL-SCNC: 4.1 MMOL/L — SIGNIFICANT CHANGE UP (ref 3.5–5.3)
RBC # BLD: 2.52 M/UL — LOW (ref 4.2–5.8)
RBC # FLD: 18.6 % — HIGH (ref 10.3–14.5)
SODIUM SERPL-SCNC: 151 MMOL/L — HIGH (ref 135–145)
WBC # BLD: 8.8 K/UL — SIGNIFICANT CHANGE UP (ref 3.8–10.5)
WBC # FLD AUTO: 8.8 K/UL — SIGNIFICANT CHANGE UP (ref 3.8–10.5)

## 2017-03-27 RX ORDER — SODIUM CHLORIDE 9 MG/ML
1000 INJECTION, SOLUTION INTRAVENOUS
Qty: 0 | Refills: 0 | Status: DISCONTINUED | OUTPATIENT
Start: 2017-03-27 | End: 2017-03-28

## 2017-03-27 RX ORDER — SUCRALFATE 1 G
1 TABLET ORAL
Qty: 0 | Refills: 0 | Status: DISCONTINUED | OUTPATIENT
Start: 2017-03-27 | End: 2017-03-30

## 2017-03-27 RX ADMIN — Medication 650 MILLIGRAM(S): at 11:43

## 2017-03-27 RX ADMIN — LORATADINE 10 MILLIGRAM(S): 10 TABLET ORAL at 11:43

## 2017-03-27 RX ADMIN — Medication 145 MILLIGRAM(S): at 11:43

## 2017-03-27 RX ADMIN — Medication 240 MILLIGRAM(S): at 06:02

## 2017-03-27 RX ADMIN — GABAPENTIN 600 MILLIGRAM(S): 400 CAPSULE ORAL at 22:42

## 2017-03-27 RX ADMIN — Medication 150 MILLIGRAM(S): at 18:50

## 2017-03-27 RX ADMIN — Medication 0.5 MILLIGRAM(S): at 19:50

## 2017-03-27 RX ADMIN — Medication 0.5 MILLIGRAM(S): at 07:52

## 2017-03-27 RX ADMIN — Medication 1 APPLICATION(S): at 18:49

## 2017-03-27 RX ADMIN — Medication 650 MILLIGRAM(S): at 18:50

## 2017-03-27 RX ADMIN — SENNA PLUS 2 TABLET(S): 8.6 TABLET ORAL at 22:42

## 2017-03-27 RX ADMIN — ISOSORBIDE MONONITRATE 120 MILLIGRAM(S): 60 TABLET, EXTENDED RELEASE ORAL at 11:43

## 2017-03-27 RX ADMIN — Medication 1 GRAM(S): at 11:44

## 2017-03-27 RX ADMIN — PRAMIPEXOLE DIHYDROCHLORIDE 0.12 MILLIGRAM(S): 0.12 TABLET ORAL at 22:42

## 2017-03-27 RX ADMIN — Medication 1 SUPPOSITORY(S): at 11:43

## 2017-03-27 RX ADMIN — Medication 100 MILLIGRAM(S): at 11:43

## 2017-03-27 RX ADMIN — Medication 650 MILLIGRAM(S): at 06:01

## 2017-03-27 RX ADMIN — Medication 12.5 MILLIGRAM(S): at 18:51

## 2017-03-27 RX ADMIN — FINASTERIDE 5 MILLIGRAM(S): 5 TABLET, FILM COATED ORAL at 11:43

## 2017-03-27 RX ADMIN — PANTOPRAZOLE SODIUM 10 MG/HR: 20 TABLET, DELAYED RELEASE ORAL at 15:37

## 2017-03-27 RX ADMIN — SIMVASTATIN 10 MILLIGRAM(S): 20 TABLET, FILM COATED ORAL at 22:43

## 2017-03-27 RX ADMIN — Medication 12.5 MILLIGRAM(S): at 06:02

## 2017-03-27 RX ADMIN — Medication 150 MILLIGRAM(S): at 06:01

## 2017-03-27 RX ADMIN — SODIUM CHLORIDE 75 MILLILITER(S): 9 INJECTION, SOLUTION INTRAVENOUS at 22:49

## 2017-03-27 RX ADMIN — PRAMIPEXOLE DIHYDROCHLORIDE 0.12 MILLIGRAM(S): 0.12 TABLET ORAL at 15:37

## 2017-03-27 RX ADMIN — Medication 1 APPLICATION(S): at 06:03

## 2017-03-27 RX ADMIN — Medication 1 GRAM(S): at 18:52

## 2017-03-27 RX ADMIN — PRAMIPEXOLE DIHYDROCHLORIDE 0.12 MILLIGRAM(S): 0.12 TABLET ORAL at 06:02

## 2017-03-27 NOTE — PROGRESS NOTE ADULT - SUBJECTIVE AND OBJECTIVE BOX
CHIEF COMPLAINT:  GIB    SUBJECTIVE:   wants to eat. has cookies, butter roll, donuts at bedside as well as coffee with cream. daughter also at bedside. Wore bipap at night.    REVIEW OF SYSTEMS:    CONSTITUTIONAL: No weakness, fevers or chills  CARDIOVASCULAR: No chest pain or palpitations  GASTROINTESTINAL: No abdominal or epigastric pain. No nausea, vomiting, or hematemesis  All other review of systems is negative unless indicated above    Vital Signs Last 24 Hrs  T(C): 37, Max: 37.1 (03-26 @ 16:36)  T(F): 98.6, Max: 98.7 (03-26 @ 16:36)  HR: 82 (78 - 99)  BP: 158/36 (145/40 - 173/38)  BP(mean): --  RR: 16 (16 - 17)  SpO2: 98% (95% - 100%)      PHYSICAL EXAM:    Constitutional: NAD, awake and alert, well-developed  HEENT: PERR, EOMI, Normal Hearing, MMM  Neck: Soft and supple, No LAD, No JVD  Respiratory: Breath sounds are clear bilaterally, No wheezing, rales or rhonchi  Cardiovascular: S1 and S2, regular rate and rhythm, no Murmurs, gallops or rubs  Gastrointestinal: Bowel Sounds present, soft  Extremities: No peripheral edema  Neurological: A/O x 3, no focal deficits  Musculoskeletal: moves all extrem  Skin: BLE bandages with ulcerations improving per family    MEDICATIONS:  MEDICATIONS  (STANDING):  finasteride 5milliGRAM(s) Oral daily  buDESOnide   0.5 milliGRAM(s) Respule 0.5milliGRAM(s) Nebulizer two times a day  doxazosin 8milliGRAM(s) Oral at bedtime  isosorbide   mononitrate ER Tablet (IMDUR) 120milliGRAM(s) Oral daily  gabapentin Oral Tab/Cap - Peds 600milliGRAM(s) Oral at bedtime  allopurinol 100milliGRAM(s) Oral daily  loratadine 10milliGRAM(s) Oral daily  fenofibrate Tablet 145milliGRAM(s) Oral daily  simvastatin 10milliGRAM(s) Oral at bedtime  pramipexole 0.125milliGRAM(s) Oral three times a day  metoprolol Oral Tab/Cap - Peds 12.5milliGRAM(s) Oral two times a day  hydrALAZINE 150milliGRAM(s) Oral two times a day  ammonium lactate 12% Lotion 1Application(s) Topical two times a day  diltiazem   CD 240milliGRAM(s) Oral daily  senna 2Tablet(s) Oral at bedtime  glycerin Suppository - Adult 1Suppository(s) Rectal daily  acetaminophen   Tablet 650milliGRAM(s) Oral every 6 hours  pantoprazole Infusion 8mG/Hr IV Continuous <Continuous>  sucralfate 1Gram(s) Oral four times a day  dextrose 5%. 1000milliLiter(s) IV Continuous <Continuous>      LABS: All Labs Reviewed:                        7.7    8.8   )-----------( 239      ( 27 Mar 2017 05:49 )             24.0     27 Mar 2017 05:49    151    |  117    |  77     ----------------------------<  110    4.1     |  23     |  1.87     Ca    8.0        27 Mar 2017 05:49            80 y/o male with PMHx of COPD, CHF, chronic renal failure, paroxysmal A-fib.with last hospitaliztion 10/2017 for hematochezia s/p bleeding scan and flex sigmoidoscopy  was BIBA for acute worsening lower back pain radiating to buttock to foot.  In Ed patient noted to have Hb 6 and stool guaiac positive  and rectal exam- with cookie  done by ED physician as per note.    Pt was admitted to telemetry and anemia was evaluated with EGD/colonscopy that revealed  non-bleeding ulcers (one with eschar) and colonic mass. He required 6 units PRBCs to maintain Hb>7. He was also evaluated by vascular and wound care for BLE vascular ulcerations. His unna boots were stopped and dressings applied daily. He also had whirlpool baths of his BLE.     Anticipate discharge  in AM if tolerating PO diet. Tentative plan is for rehab for PT and continued wound care.      Assessment and Plan:   		  80 y/o male with PMHx of COPD, CHF, chronic renal failure, paroxysmal A-fib.with last hospitalization 10/2017 for hematochezia s/p bleeding scan and flex sigmoidoscopy that is admitted for low hgb likely secondary to GIB from gastric ulcer.      Problem/Plan - 1: Acute Blood Loss Anemia due to UGIB associated with Gastric Ulcer  -Hb 6.3 on arrival, s/p 7 units PRBCs per nursing. Hb stable this AM at 7.7  -EGD:4 non-bleeding cratered ulcers fundus/curvature of gastric body, the largest 15mm in dimension with eschar not amenable to intervention  -Colon: nonobstructing 3 cmx 7cm mass in transverse colon, no bleeding. limited prep  -Protonix gtt, to continue per GI, carafate added  -Diet advanced today to low fiber.   -hold iron given confounding dark stool and iron load with PRBCs (iron low per labs)    Problem/Plan - 2: SHAYY with chronic hypoxic, hypercapnic respiratory failure superimposed on COPD  -Bipap while asleep, though pt did not wear for 2 days  -Daytime O2 at 2LNC  -no longer smoking      Problem/Plan - 3: BLE venous stasis ulcers  -PTA, wound care with Dr Cardenas with xeroform, kerlix and ace bandages for dressings.    -Evaluation by vascular this admission  -wound care nurse evaluation: LLE with erythema and scattered ulcerations draining tan discharge with foul odor. RLE with erythema and scattered ulcerations draining tan discharge with foul odor. RLE with an ulceration to the lateral malleolus with tan tissue measuring [id number]. Dopplers used to auscultate dorsalis pedal pulses, L > R.   -wound care recs: At this time, patient would greatly benefit from a whirlpool treatment to both lower extremities to remove debris and assist with odor control. Patient remains in his [employer] 9000 MRS on his backside until he whirlpool arrives. 1) Daily Whirlpool treatments with 2 drops of hibiclens 2) After whirlpool apply Xeroform, kerlix and ace bandage. 3) Elevate extremities off mattress. 4) Turn and position every 2 hours  -investigate surgical shoes for feet. currently wearing cut crocs.    Problem/Plan - 4: ARYA on CKD IV   -cr 3.5 on arrival, now 1.87  -holding nephrotoxic agents  -renal also following    Problem/Plan - 5: Essential hypertension  -c/w current management BP stable over course of admission.     Problem/Plan - 6: Hypovolemic Hypernatremia, hyperchloremia  -Diet advanced, encourage free water  -D5W overnight    Problem/Plan - 7: Spinal stenosis  -Gabapentin  -PT eval for lower extremity weakness      Problem/Plan - 8:Paroxysmal Atrial Fibrillation  -rate control  -no amiodarone due to underlying lung capacity  -CHADS2=2.  no AC given GIB    Problem/Plan - 9:Morbid Obesity  -BMI 37 by chart, but likely he needs accurate weight recorded  -high hip: waste ratio  -nutrition consult when diet advanced  -pt not open to changing diet as multiple complex carbs already at bedside in anticipation of advanced diet today.    Problem/Plan - 10: Coronary artery disease  -Chronic diastolic CHF, CAD s/p PCI with stent in 2002, Last cath in july 2014 with RCA disease   -BBlockers, fibrates, statin  -no ASA due to bleeding

## 2017-03-27 NOTE — PROGRESS NOTE ADULT - SUBJECTIVE AND OBJECTIVE BOX
Patient is a 82y old  Male who presents with a chief complaint of left leg pain radiating from left buttoch and thigh (21 Mar 2017 06:35)      HPI:  HPI: :: 80 y/o male with PMHx of COPD, CHF, chronic renal failure, paroxysmal A-fib.with last hospitaliztion 10/2017 for hematochezia s/p bleeding scan and flex sigmoidoscopy  was BIBA for acute worsening lower back pain radiating to buttock to foot   patient with amber boots for chronic venous stasis ulcers and lower extremity chronic edema ,daughter reports increased oozing from ulcers in lai past few weeks,no fever  In Ed patient noted to have Hb 6 and stool guaiac positive  and rectal exam- with cookie  done by ED physician as per note   Patient denies any chest pain or worsening of SOB ,no abdominal pain or vomiting ,no diarrhea,no efver or chills  seen and evaluated along with Dr SANTIZO  His dtr is contacted as well    all his recent data and PFT data obtained and discussed with his cardiologist today 3/24  pt is scheduled for his GI workup today    3/27  pt is seen and examined and feels better  asks for food  no further transfusions required  no distress  tolerated procedure well  no co  no sob  PMHx: ::  HTN,  COPD on home O2 2L,  SHAYY on nocturnal BIPAP,  Chronic diastolic CHF,  CAD s/p PCI with stent in 2002,  Last cath in july 2014 with RCA disease medically managed, Hyperlipidemia,  PVD,  Iron deficiency anemia,  Chronic back pain,  Gout,  BPH,  CKD III with baseline Cr 2.2,  PAF not on a/c,  Hx of GI Bleed in the past, hemorrhoids s/p banding,  PSHx: ::  Right rotator cuff repair  pilonidal cyst  Family History: ::  Father: rectal CA  Mother: HTN, DM  3 siblings: DM  Social History: ::  Tobacco: smoked 1ppd X 50 years, quit 22 years ago.  Rare ETOH use.  wife recently passed away last year (21 Mar 2017 06:35)      PAST MEDICAL & SURGICAL HISTORY:  Sepsis, due to unspecified organism: 2/2 poorly healing wounds b/l  BPH (benign prostatic hypertrophy)  Hyperlipemia  Coronary artery disease  Hypertension  Dyspepsia: On moderate exertion.  Sleep apnea, obstructive: Requires home 02 therapy, and treatment with BIPAP  Atelectasis  Pleural effusion, bilateral  Respiratory failure  Peripheral edema  CRI (chronic renal insufficiency)  Gout  CRF (chronic renal failure)  Benign prostatic hypertrophy  Spinal stenosis  Hypercholesterolemia  GERD (gastroesophageal reflux disease)  CAD (coronary artery disease)  Hypertension  S/P angioplasty with stent  Cataract of left eye  Prostate: Surgery green light procedure.  S/P rotator cuff surgery: Right  S/P angioplasty  Rotator cuff tear, right: repair      PREVIOUS DIAGNOSTIC TESTING:    MEDICATIONS  (STANDING):  finasteride 5milliGRAM(s) Oral daily  buDESOnide   0.5 milliGRAM(s) Respule 0.5milliGRAM(s) Nebulizer two times a day  doxazosin 8milliGRAM(s) Oral at bedtime  isosorbide   mononitrate ER Tablet (IMDUR) 120milliGRAM(s) Oral daily  gabapentin Oral Tab/Cap - Peds 600milliGRAM(s) Oral at bedtime  allopurinol 100milliGRAM(s) Oral daily  loratadine 10milliGRAM(s) Oral daily  fenofibrate Tablet 145milliGRAM(s) Oral daily  simvastatin 10milliGRAM(s) Oral at bedtime  pramipexole 0.125milliGRAM(s) Oral three times a day  metoprolol Oral Tab/Cap - Peds 12.5milliGRAM(s) Oral two times a day  hydrALAZINE 150milliGRAM(s) Oral two times a day  ammonium lactate 12% Lotion 1Application(s) Topical two times a day  diltiazem   CD 240milliGRAM(s) Oral daily  senna 2Tablet(s) Oral at bedtime  glycerin Suppository - Adult 1Suppository(s) Rectal daily  acetaminophen   Tablet 650milliGRAM(s) Oral every 6 hours  pantoprazole Infusion 8mG/Hr IV Continuous <Continuous>  sucralfate 1Gram(s) Oral four times a day    MEDICATIONS  (PRN):  nitroglycerin     SubLingual 0.4milliGRAM(s) SubLingual every 5 minutes PRN Chest Pain  ondansetron Injectable 4milliGRAM(s) IV Push every 6 hours PRN Nausea and/or Vomiting  diphenhydrAMINE   Elixir 12.5milliGRAM(s) Oral every 6 hours PRN Rash and/or Itching        FAMILY HISTORY:  No pertinent family history in first degree relatives          REVIEW OF SYSTEM:  Pertinent items are noted in HPI.  Constitutional negative for chills, fevers, sweats and weight loss  throat, and face:  negative for epistaxis, nasal congestion, sore throat and   tinnitus  Respiratory: negative for cough,pos  dyspnea on exertion,no pleuritic chest pain  and wheezing  Cardiovascular:  negative for chest pain, dyspnea and palpitations  Gastrointestinal: negative for abdominal pain, diarrhea, nausea and vomiting  Genitourinary: negative for dysuria, frequency and urinary incontinence  Skin:  negative for redness, rash, pruritus, swelling, dryness and   fissures  Hematologic/lymphatic: negative for bleeding and easy bruising  Musculoskeletal: negative for arthralgias, back pain and muscle weakness  Neurological: negative for dizziness, headaches, seizures and tremors  Behavioral/Psych:  negative for mood change, depression, suicidal attempts    Allergic/Immunologic: negative for anaphylaxis, angioedema and urticaria    Vital Signs Last 24 Hrs  T(C): 37, Max: 37.2 (03-26 @ 11:00)  T(F): 98.6, Max: 99 (03-26 @ 11:00)  HR: 82 (78 - 99)  BP: 153/94 (127/37 - 173/38)  BP(mean): --  RR: 16 (16 - 17)  SpO2: 95% (95% - 100%)    PHYSICAL EXAM  General Appearance: cooperative, no acute distress,   HEENT: PERRL, conjunctiva clear, EOM's intact, non injected pharynx, no exudate, TM   normal  Neck: Supple, , no adenopathy, thyroid: not enlarged, no carotid bruit or JVD  Back: Symmetric, no  tenderness,no soft tissue tenderness  Lungs: Clear to auscultation bilateral,no adventitious breath sounds, normal   expiratory phase  Heart: Regular rate and rhythm, S1, S2 normal, no murmur, rub or gallop  Abdomen: Soft, non-tender, bowel sounds active , no hepatosplenomegaly  Extremities: no cyanosis or edema, no joint swelling  Skin: Skin color, texture normal, no rashes   Neurologic: Alert and oriented X3 , cranial nerves intact, sensory and motor normal,    ECG:    LABS:                                   7.7    8.8   )-----------( 239      ( 27 Mar 2017 05:49 )             24.0       27 Mar 2017 05:49    151    |  117    |  77     ----------------------------<  110    4.1     |  23     |  1.87     Ca    8.0        27 Mar 2017 05:49                 8.2    x     )-----------( x        ( 23 Mar 2017 07:54 )             25.4     23 Mar 2017 05:00    151    |  118    |  107    ----------------------------<  113    4.5     |  23     |  2.50     Ca    7.8        23 Mar 2017 05:00    TPro  5.7    /  Alb  2.4    /  TBili  0.5    /  DBili  x      /  AST  23     /  ALT  11     /  AlkPhos  35     21 Mar 2017 09:55    CARDIAC MARKERS ( 21 Mar 2017 17:23 )  0.028 ng/mL / x     / 196 U/L / x     / x      CARDIAC MARKERS ( 21 Mar 2017 09:55 )  0.023 ng/mL / x     / 172 U/L / x     / x            Pro BNP  1025 03-21 @ 09:55  D Dimer  -- 03-21 @ 09:55    PT/INR - ( 21 Mar 2017 09:55 )   PT: 11.9 sec;   INR: 1.08 ratio         PTT - ( 21 Mar 2017 09:55 )  PTT:35.5 sec  INTERPRETATION:  Exam Date: 3/21/2017 7:33 AM    History: Back pain    Technique: Single frontal portable view of the chest with comparison to    10/20/2016    Findings:    Studies limited by rotation.    The heart is enlarged. No focal consolidation. The apices and   hemidiaphragms are unremarkable. Degenerative changes of the visualized   osseous structures.        Impression:EXAM:  CHEST SINGLE VIEW FRONTAL                            PROCEDURE DATE:  03/23/2017        INTERPRETATION:  Views:1  Comparison: 2 days ago  History: CHF    The lungs are clear of infiltrates and effusions.     The cardiac and   mediastinal contours appear unremarkable.     Impression:  1. No evidence of acute pulmonary disease.      Cardiomegaly          ABG - ( 23 Mar 2017 11:07 )  pH: 7.40  /  pCO2: 35    /  pO2: 109   / HCO3: 21    / Base Excess: -3    /  SaO2: 99                RADIOLOGY & ADDITIONAL STUDIES:

## 2017-03-27 NOTE — PROGRESS NOTE ADULT - ASSESSMENT
anemia  likely multifactorial with GIB and redal dx, Acdx    multiple ulcers in sotm, diff including CA dw pt and family   no bx due to inc risk of bleeding and likely not surgical cadidate, Daughter agrees    cont PPI gtt  serial HCT  add carafate  advance diet          serial Hct, keep hgb over 8      copd, home o2  CAD, on ASA    ASA stopped      constipation, recent, self disimpaction  laxatives  cont tx      LBP avoid NSAIDs    COPD and SHAYY  cont tx

## 2017-03-27 NOTE — PROGRESS NOTE ADULT - ASSESSMENT
1. Anemia- Management pre primary team and GI team. GI following- s/p EGD/colon- large gastric ulcers. WIll continue to hold antiplatelet therapy and any anticoagulation.Continue cardizem as tolerated by the HR and BP. Hgb 8.1 to 7.4- continue to follow CBC closely and transfuse as needed. Will need GI f/u.     2. CAD, s/p PCI and atherectomy- pt is off anticoagulation and antiplatelet agents. Benefits outweigh risks with antiplatelet agents now.  Continue statin.     3. HTN- continue current meds.    4. Afib- currently in SR. Can D/C tele for now. Not on anticoagulation at this time. Per pulmonary team pt is not an optimal candidate for amiodarone since he has already low lung reserve.    5. Mechanical DVT proph.

## 2017-03-27 NOTE — PROGRESS NOTE ADULT - SUBJECTIVE AND OBJECTIVE BOX
Cardiology Progress Note:    HPI:  81 y/o male with PMHx of COPD, CHF, chronic renal failure, paroxysmal A-fib.with last hospitaliztion 10/2017 for hematochezia s/p bleeding scan and flex sigmoidoscopy  was BIBA for acute worsening lower back pain radiating to buttock to foot. Patient with amber boots for chronic venous stasis ulcers and lower extremity chronic edema ,daughter reports increased oozing from ulcers in the past few weeks, no fever.     3/25- No CP/SOB. No fevers. SR on tele.    3/26- Pt transfused another unit pRBCs yesterday- hgb 7.4 now. No CP/SOB. SR on tele.     3/27- No events last pm. No new complaints. No CP/SOB.     Family History: Father: rectal CA  Mother: HTN, DM  3 siblings: DM    Social History: ::  Tobacco: smoked 1ppd X 50 years, quit 22 years ago.  Rare ETOH use.  wife recently passed away last year (21 Mar 2017 06:35)    PAST MEDICAL & SURGICAL HISTORY:  Sepsis, due to unspecified organism: 2/2 poorly healing wounds b/l  BPH (benign prostatic hypertrophy)  Hyperlipemia  Coronary artery disease  Hypertension  Dyspepsia: On moderate exertion.  Sleep apnea, obstructive: Requires home 02 therapy, and treatment with BIPAP  Atelectasis  Pleural effusion, bilateral  Respiratory failure  Peripheral edema  CRI (chronic renal insufficiency)  Gout  CRF (chronic renal failure)  Benign prostatic hypertrophy  Spinal stenosis  Hypercholesterolemia  GERD (gastroesophageal reflux disease)  CAD (coronary artery disease)  Hypertension  S/P angioplasty with stent  Cataract of left eye  Prostate: Surgery green light procedure.  S/P rotator cuff surgery: Right  S/P angioplasty  Rotator cuff tear, right: repair    MEDICATIONS  (STANDING):  pantoprazole Infusion 8mG/Hr IV Continuous <Continuous>  finasteride 5milliGRAM(s) Oral daily  buDESOnide   0.5 milliGRAM(s) Respule 0.5milliGRAM(s) Nebulizer two times a day  aspirin enteric coated 81milliGRAM(s) Oral daily  doxazosin 8milliGRAM(s) Oral at bedtime  isosorbide   mononitrate ER Tablet (IMDUR) 120milliGRAM(s) Oral daily  diltiazem    Tablet 120milliGRAM(s) Oral two times a day  gabapentin Oral Tab/Cap - Peds 600milliGRAM(s) Oral at bedtime  gabapentin 300milliGRAM(s) Oral daily  allopurinol 100milliGRAM(s) Oral daily  loratadine 10milliGRAM(s) Oral daily  fenofibrate Tablet 145milliGRAM(s) Oral daily  simvastatin 10milliGRAM(s) Oral at bedtime  pramipexole 0.125milliGRAM(s) Oral three times a day  metoprolol Oral Tab/Cap - Peds 12.5milliGRAM(s) Oral two times a day  famotidine    Tablet 20milliGRAM(s) Oral at bedtime  ferrous    sulfate 325milliGRAM(s) Oral three times a day with meals  hydrALAZINE 150milliGRAM(s) Oral two times a day  pantoprazole    Tablet 40milliGRAM(s) Oral two times a day before meals  ammonium lactate 12% Lotion 1Application(s) Topical two times a day  heparin  Injectable 5000Unit(s) SubCutaneous every 8 hours    MEDICATIONS  (PRN):  nitroglycerin     SubLingual 0.4milliGRAM(s) SubLingual every 5 minutes PRN Chest Pain    Allergies  No Known Allergies    Intolerances    Vital Signs Last 24 Hrs  T(C): 36.6, Max: 36.7 (03-20 @ 22:39)  T(F): 97.9, Max: 98.1 (03-20 @ 22:39)  HR: 95 (90 - 112)  BP: 121/53 (121/53 - 140/63)  BP(mean): --  RR: 18 (16 - 18)  SpO2: 93% (93% - 100%)    REVIEW OF SYSTEMS:    CONSTITUTIONAL:  As per HPI.  HEENT:  Eyes:  No diplopia or blurred vision. ENT:  No earache, sore throat or runny nose.  CARDIOVASCULAR:  No pressure, squeezing, strangling, tightness, heaviness or aching about the chest, neck, axilla or epigastrium.  RESPIRATORY:  No cough, shortness of breath, PND or orthopnea.  GASTROINTESTINAL:  No nausea, vomiting or diarrhea.  GENITOURINARY:  No dysuria, frequency or urgency.  MUSCULOSKELETAL:  As per HPI.  SKIN:  No change in skin, hair or nails.  NEUROLOGIC:  No paresthesias, fasciculations, seizures or weakness.  PSYCHIATRIC:  No disorder of thought or mood.  ENDOCRINE:  No heat or cold intolerance, polyuria or polydipsia.  HEMATOLOGICAL:  No easy bruising or bleedings:  .     PHYSICAL EXAMINATION:    GENERAL APPEARANCE:  Pt. is not currently dyspneic, in no distress. Pt. is alert, oriented, and pleasant.  HEENT:  Pupils are normal and react normally. No icterus. Mucous membranes well colored.  NECK:  Supple. No lymphadenopathy. Jugular venous pressure not elevated. Carotids equal.   HEART:   The cardiac impulse has a normal quality. There are no murmurs, rubs or gallops noted  CHEST:  Chest is clear to auscultation. Normal respiratory effort.  ABDOMEN:  Soft and nontender.   EXTREMITIES:  There is no edema.   SKIN:  No rash or significant lesions are noted.    I&O's Summary    LABS:                        6.3    8.6   )-----------( 337      ( 20 Mar 2017 22:39 )             20.0   20 Mar 2017 22:39    147    |  114    |  114    ----------------------------<  124    4.4     |  23     |  3.51     Ca    8.1        20 Mar 2017 22:39    TPro  5.7    /  Alb  2.5    /  TBili  0.2    /  DBili  x      /  AST  19     /  ALT  14     /  AlkPhos  34     20 Mar 2017 22:39  LIVER FUNCTIONS - ( 20 Mar 2017 22:39 )  Alb: 2.5 g/dL / Pro: 5.7 gm/dL / ALK PHOS: 34 U/L / ALT: 14 U/L / AST: 19 U/L / GGT: x           PT/INR - ( 20 Mar 2017 22:39 )   PT: 11.3 sec;   INR: 1.03 ratio       PTT - ( 20 Mar 2017 22:39 )  PTT:33.6 secCARDIAC MARKERS ( 21 Mar 2017 03:42 )  0.018 ng/mL / x     / 107 U/L / x     / x        TELEMETRY: Normal sinus rhythm with no tachy or nelly events    RADIOLOGY- CXR- 3/23- [facility].    ASSESSMENT/PLAN-

## 2017-03-27 NOTE — PROGRESS NOTE ADULT - SUBJECTIVE AND OBJECTIVE BOX
NEPHROLOGY INTERVAL HPI/OVERNIGHT EVENTS:  no new complaints.  doing well.  GI input noted, to start on carafate with advancing of diet    MEDICATIONS  (STANDING):  finasteride 5milliGRAM(s) Oral daily  buDESOnide   0.5 milliGRAM(s) Respule 0.5milliGRAM(s) Nebulizer two times a day  doxazosin 8milliGRAM(s) Oral at bedtime  isosorbide   mononitrate ER Tablet (IMDUR) 120milliGRAM(s) Oral daily  gabapentin Oral Tab/Cap - Peds 600milliGRAM(s) Oral at bedtime  allopurinol 100milliGRAM(s) Oral daily  loratadine 10milliGRAM(s) Oral daily  fenofibrate Tablet 145milliGRAM(s) Oral daily  simvastatin 10milliGRAM(s) Oral at bedtime  pramipexole 0.125milliGRAM(s) Oral three times a day  metoprolol Oral Tab/Cap - Peds 12.5milliGRAM(s) Oral two times a day  hydrALAZINE 150milliGRAM(s) Oral two times a day  ammonium lactate 12% Lotion 1Application(s) Topical two times a day  diltiazem   CD 240milliGRAM(s) Oral daily  senna 2Tablet(s) Oral at bedtime  glycerin Suppository - Adult 1Suppository(s) Rectal daily  acetaminophen   Tablet 650milliGRAM(s) Oral every 6 hours  pantoprazole Infusion 8mG/Hr IV Continuous <Continuous>  sucralfate 1Gram(s) Oral four times a day  dextrose 5%. 1000milliLiter(s) IV Continuous <Continuous>    MEDICATIONS  (PRN):  nitroglycerin     SubLingual 0.4milliGRAM(s) SubLingual every 5 minutes PRN Chest Pain  ondansetron Injectable 4milliGRAM(s) IV Push every 6 hours PRN Nausea and/or Vomiting  diphenhydrAMINE   Elixir 12.5milliGRAM(s) Oral every 6 hours PRN Rash and/or Itching      Allergies    No Known Allergies    Intolerances            Vital Signs Last 24 Hrs  T(C): 37, Max: 37.1 ( @ 16:36)  T(F): 98.6, Max: 98.7 ( @ 16:36)  HR: 82 (78 - 99)  BP: 158/36 (145/40 - 173/38)  BP(mean): --  RR: 16 (16 - 17)  SpO2: 98% (95% - 100%)  Daily     Daily Weight in k.8 (27 Mar 2017 07:10)    PHYSICAL EXAM:  General: alert. awake Ox3  HEENT: MMM  CV: s1s2 rrr  LUNGS: B/L CTA  EXT: LE dressing in place    LABS:                        7.7    8.8   )-----------( 239      ( 27 Mar 2017 05:49 )             24.0     27 Mar 2017 05:49    151    |  117    |  77     ----------------------------<  110    4.1     |  23     |  1.87     Ca    8.0        27 Mar 2017 05:49

## 2017-03-27 NOTE — PROGRESS NOTE ADULT - ASSESSMENT
80 y/o male with PMHx of COPD, CHF, chronic renal failure, paroxysmal A-fib.with last hospitaliztion 10/2017 for hematochezia s/p bleeding scan and flex sigmoidoscopy  was BIBA  now being worked up for GI bleed and has required multiple 4-5 units PRBCs done  now needs eval for upper and likley lower endoscopy  Chronic hypoxemic resp failure  CHF appears stable  P Afib now in nsr  hemodynamically stabe  SHAYY / OHS /COPD-overlap syndrome  suggest ABG and repeat cxr today to complete her workup  I've explained to pt and his DTR, critical care RN that pt is at incresaed risk of resp failure requiring mechanical ventilation with sedation, but his pulm status appears optimized presently for this indicated procedure with recent requirement to get muliple prbcs over last few days  recommend  ABG noted  cxr is clear  cont nocturnal BIPAP  Anesthesia veal before his procedure  cardiac eval in progress  will check outpt pfts, placed in chart  ABG noted  FEV1 6/2016 1.38, 68% predicted  DLCO 45% predicted  supportive care  BIPAP 12/8 with o2 2 liters attached  plan as per GI  cont current rx  supportive care required

## 2017-03-27 NOTE — PROGRESS NOTE ADULT - SUBJECTIVE AND OBJECTIVE BOX
Patient is a 82y old  Male who presents with a chief complaint of left leg pain radiating from left buttoch and thigh (21 Mar 2017 06:35)      HPI:  HPI: :: 82 y/o male with PMHx of COPD, CHF, chronic renal failure, paroxysmal A-fib.with last hospitaliztion 10/2017 for hematochezia s/p bleeding scan and flex sigmoidoscopy  was BIBA for acute worsening lower back pain radiating to buttock to foot   patient with amber boots for chronic venous stasis ulcers and lower extremity chronic edema ,daughter reports increased oozing from ulcers in lai past few weeks,no fever  In Ed patient noted to have Hb 6 and stool guaiac positive  and rectal exam- with cookie  done by ED physician as per note   Patient denies any chest pain or worsening of SOB ,no abdominal pain or vomiting ,no diarrhea,no efver or chills    events wkend noted  received PRBC  feels comf  neg n  pos mild SOB and fatigue  feels hungry  neg CP   dark stool    PMHx: ::  HTN,  COPD on home O2 2L,  SHAYY on nocturnal BIPAP,  Chronic diastolic CHF,  CAD s/p PCI with stent in 2002,  Last cath in july 2014 with RCA disease medically managed, Hyperlipidemia,  PVD,  Iron deficiency anemia,  Chronic back pain,  Gout,  BPH,  CKD III with baseline Cr 2.2,  PAF not on a/c,  Hx of GI Bleed in the past, hemorrhoids s/p banding,  PSHx: ::  Right rotator cuff repair  pilonidal cyst  Family History: ::  Father: rectal CA  Mother: HTN, DM  3 siblings: DM  Social History: ::  Tobacco: smoked 1ppd X 50 years, quit 22 years ago.  Rare ETOH use.  wife recently passed away last year (21 Mar 2017 06:35)      PAST MEDICAL & SURGICAL HISTORY:  Sepsis, due to unspecified organism: 2/2 poorly healing wounds b/l  BPH (benign prostatic hypertrophy)  Hyperlipemia  Coronary artery disease  Hypertension  Dyspepsia: On moderate exertion.  Sleep apnea, obstructive: Requires home 02 therapy, and treatment with BIPAP  Atelectasis  Pleural effusion, bilateral  Respiratory failure  Peripheral edema  CRI (chronic renal insufficiency)  Gout  CRF (chronic renal failure)  Benign prostatic hypertrophy  Spinal stenosis  Hypercholesterolemia  GERD (gastroesophageal reflux disease)  CAD (coronary artery disease)  Hypertension  S/P angioplasty with stent  Cataract of left eye  Prostate: Surgery green light procedure.  S/P rotator cuff surgery: Right  S/P angioplasty  Rotator cuff tear, right: repair      MEDICATIONS  (STANDING):  finasteride 5milliGRAM(s) Oral daily  buDESOnide   0.5 milliGRAM(s) Respule 0.5milliGRAM(s) Nebulizer two times a day  doxazosin 8milliGRAM(s) Oral at bedtime  isosorbide   mononitrate ER Tablet (IMDUR) 120milliGRAM(s) Oral daily  gabapentin Oral Tab/Cap - Peds 600milliGRAM(s) Oral at bedtime  allopurinol 100milliGRAM(s) Oral daily  loratadine 10milliGRAM(s) Oral daily  fenofibrate Tablet 145milliGRAM(s) Oral daily  simvastatin 10milliGRAM(s) Oral at bedtime  pramipexole 0.125milliGRAM(s) Oral three times a day  metoprolol Oral Tab/Cap - Peds 12.5milliGRAM(s) Oral two times a day  hydrALAZINE 150milliGRAM(s) Oral two times a day  ammonium lactate 12% Lotion 1Application(s) Topical two times a day  diltiazem   CD 240milliGRAM(s) Oral daily  senna 2Tablet(s) Oral at bedtime  glycerin Suppository - Adult 1Suppository(s) Rectal daily  acetaminophen   Tablet 650milliGRAM(s) Oral every 6 hours  pantoprazole Infusion 8mG/Hr IV Continuous <Continuous>  sucralfate 1Gram(s) Oral four times a day    MEDICATIONS  (PRN):  nitroglycerin     SubLingual 0.4milliGRAM(s) SubLingual every 5 minutes PRN Chest Pain  ondansetron Injectable 4milliGRAM(s) IV Push every 6 hours PRN Nausea and/or Vomiting  diphenhydrAMINE   Elixir 12.5milliGRAM(s) Oral every 6 hours PRN Rash and/or Itching      Allergies    No Known Allergies    Intolerances        SOCIAL HISTORY:unchanged    FAMILY HISTORY:  No pertinent family history in first degree relatives      REVIEW OF SYSTEMS:    CONSTITUTIONAL: No weakness, fevers or chills  EYES/ENT: No visual changes;  No vertigo or throat pain   NECK: No pain or stiffness  RESPIRATORY: No cough, wheezing, hemoptysis; No shortness of breath  CARDIOVASCULAR: No chest pain or palpitations  GENITOURINARY: No dysuria, frequency or hematuria  NEUROLOGICAL: No numbness or weakness  SKIN: No itching, burning, rashes, or lesions   All other review of systems is negative unless indicated above.    Vital Signs Last 24 Hrs  T(C): 37, Max: 37.2 (03-26 @ 11:00)  T(F): 98.6, Max: 99 (03-26 @ 11:00)  HR: 82 (76 - 99)  BP: 153/94 (127/37 - 173/38)  BP(mean): --  RR: 16 (16 - 17)  SpO2: 95% (95% - 100%)    PHYSICAL EXAM:    Constitutional: NAD, well-developed  HEENT: EOMI, throat clear  Neck: No LAD, supple  Respiratory: CTA and P  Cardiovascular: S1 and S2, RRR, no M  Gastrointestinal: BS+, soft, NT/ND, neg HSM,  Extremities: No peripheral edema, neg clubing, cyanosis  Vascular: 2+ peripheral pulses  Neurological: A/O x 3, no focal deficits  Psychiatric: Normal mood, normal affect  Skin: No rashes    LABS:  CBC Full  -  ( 27 Mar 2017 05:49 )  WBC Count : 8.8 K/uL  Hemoglobin : 7.7 g/dL  Hematocrit : 24.0 %  Platelet Count - Automated : 239 K/uL  Mean Cell Volume : 95.3 fl  Mean Cell Hemoglobin : 30.5 pg  Mean Cell Hemoglobin Concentration : 32.0 gm/dL  Auto Neutrophil # : x  Auto Lymphocyte # : x  Auto Monocyte # : x  Auto Eosinophil # : x  Auto Basophil # : x  Auto Neutrophil % : x  Auto Lymphocyte % : x  Auto Monocyte % : x  Auto Eosinophil % : x  Auto Basophil % : x    27 Mar 2017 05:49    151    |  117    |  77     ----------------------------<  110    4.1     |  23     |  1.87     Ca    8.0        27 Mar 2017 05:49              RADIOLOGY & ADDITIONAL STUDIES:

## 2017-03-27 NOTE — PROGRESS NOTE ADULT - ASSESSMENT
- AYRA/CKD  stage 4 (scr 2.5) due to volume depletion, will hold lasix for now and fu response to transfusion.  fu AM labs send,   - Anemia:  s/p egd and colonscopy, with ulcers.  likely source of bleed.  fu the trend of hgb.  would dc with hgb close to 9 with transfusion as needed.  - Hypernatremia: po fluid intake    3/25 MK  - CKD stage 4: stable.  hold lasix standing dose and only prn as needed  - Hypernatremia: po fluid intake  - GI bleed: transfusion with lasix iv x1    3/26 MK  - CKD stage 4 with mild hypernatremia: hold iv lasix and encourage moderate po fluid intake.    - GI bleed: fu trend of the hgb.  on protonix drip.    3/27 MK  - CKD stage 4, stable restart lasix upon dc.  of goes to rehab would restart at lasix 40 mg qd.  if goes to home, lasix 40 mg alternating with lasix 80 mg.    - GI bleed, advance diet and carafate   - Hypernatremia: moniter on advanced po fluid intake

## 2017-03-28 LAB
ANION GAP SERPL CALC-SCNC: 8 MMOL/L — SIGNIFICANT CHANGE UP (ref 5–17)
BUN SERPL-MCNC: 82 MG/DL — HIGH (ref 7–23)
CALCIUM SERPL-MCNC: 7.8 MG/DL — LOW (ref 8.5–10.1)
CHLORIDE SERPL-SCNC: 117 MMOL/L — HIGH (ref 96–108)
CO2 SERPL-SCNC: 24 MMOL/L — SIGNIFICANT CHANGE UP (ref 22–31)
CREAT SERPL-MCNC: 1.98 MG/DL — HIGH (ref 0.5–1.3)
GLUCOSE SERPL-MCNC: 125 MG/DL — HIGH (ref 70–99)
HCT VFR BLD CALC: 20.8 % — CRITICAL LOW (ref 39–50)
HCT VFR BLD CALC: 22.6 % — LOW (ref 39–50)
HGB BLD-MCNC: 6.8 G/DL — CRITICAL LOW (ref 13–17)
HGB BLD-MCNC: 7.5 G/DL — LOW (ref 13–17)
MCHC RBC-ENTMCNC: 30.8 PG — SIGNIFICANT CHANGE UP (ref 27–34)
MCHC RBC-ENTMCNC: 31 PG — SIGNIFICANT CHANGE UP (ref 27–34)
MCHC RBC-ENTMCNC: 32.7 GM/DL — SIGNIFICANT CHANGE UP (ref 32–36)
MCHC RBC-ENTMCNC: 33.2 GM/DL — SIGNIFICANT CHANGE UP (ref 32–36)
MCV RBC AUTO: 93.4 FL — SIGNIFICANT CHANGE UP (ref 80–100)
MCV RBC AUTO: 94.2 FL — SIGNIFICANT CHANGE UP (ref 80–100)
PLATELET # BLD AUTO: 217 K/UL — SIGNIFICANT CHANGE UP (ref 150–400)
PLATELET # BLD AUTO: 226 K/UL — SIGNIFICANT CHANGE UP (ref 150–400)
POTASSIUM SERPL-MCNC: 4.3 MMOL/L — SIGNIFICANT CHANGE UP (ref 3.5–5.3)
POTASSIUM SERPL-SCNC: 4.3 MMOL/L — SIGNIFICANT CHANGE UP (ref 3.5–5.3)
RBC # BLD: 2.21 M/UL — LOW (ref 4.2–5.8)
RBC # BLD: 2.42 M/UL — LOW (ref 4.2–5.8)
RBC # FLD: 17 % — HIGH (ref 10.3–14.5)
RBC # FLD: 17.1 % — HIGH (ref 10.3–14.5)
SODIUM SERPL-SCNC: 149 MMOL/L — HIGH (ref 135–145)
WBC # BLD: 8 K/UL — SIGNIFICANT CHANGE UP (ref 3.8–10.5)
WBC # BLD: 9 K/UL — SIGNIFICANT CHANGE UP (ref 3.8–10.5)
WBC # FLD AUTO: 8 K/UL — SIGNIFICANT CHANGE UP (ref 3.8–10.5)
WBC # FLD AUTO: 9 K/UL — SIGNIFICANT CHANGE UP (ref 3.8–10.5)

## 2017-03-28 RX ORDER — FUROSEMIDE 40 MG
40 TABLET ORAL DAILY
Qty: 0 | Refills: 0 | Status: DISCONTINUED | OUTPATIENT
Start: 2017-03-28 | End: 2017-03-30

## 2017-03-28 RX ORDER — FLUTICASONE PROPIONATE 50 MCG
1 SPRAY, SUSPENSION NASAL
Qty: 0 | Refills: 0 | Status: DISCONTINUED | OUTPATIENT
Start: 2017-03-28 | End: 2017-04-05

## 2017-03-28 RX ADMIN — GABAPENTIN 600 MILLIGRAM(S): 400 CAPSULE ORAL at 21:51

## 2017-03-28 RX ADMIN — Medication 1 GRAM(S): at 00:30

## 2017-03-28 RX ADMIN — PRAMIPEXOLE DIHYDROCHLORIDE 0.12 MILLIGRAM(S): 0.12 TABLET ORAL at 21:51

## 2017-03-28 RX ADMIN — Medication 1 GRAM(S): at 17:43

## 2017-03-28 RX ADMIN — LORATADINE 10 MILLIGRAM(S): 10 TABLET ORAL at 11:56

## 2017-03-28 RX ADMIN — Medication 40 MILLIGRAM(S): at 11:57

## 2017-03-28 RX ADMIN — Medication 1 SPRAY(S): at 21:50

## 2017-03-28 RX ADMIN — Medication 1 GRAM(S): at 14:43

## 2017-03-28 RX ADMIN — Medication 8 MILLIGRAM(S): at 00:29

## 2017-03-28 RX ADMIN — SIMVASTATIN 10 MILLIGRAM(S): 20 TABLET, FILM COATED ORAL at 21:51

## 2017-03-28 RX ADMIN — PRAMIPEXOLE DIHYDROCHLORIDE 0.12 MILLIGRAM(S): 0.12 TABLET ORAL at 07:38

## 2017-03-28 RX ADMIN — ISOSORBIDE MONONITRATE 120 MILLIGRAM(S): 60 TABLET, EXTENDED RELEASE ORAL at 11:56

## 2017-03-28 RX ADMIN — Medication 150 MILLIGRAM(S): at 05:12

## 2017-03-28 RX ADMIN — Medication 1 APPLICATION(S): at 05:14

## 2017-03-28 RX ADMIN — Medication 150 MILLIGRAM(S): at 17:45

## 2017-03-28 RX ADMIN — Medication 12.5 MILLIGRAM(S): at 05:13

## 2017-03-28 RX ADMIN — Medication 12.5 MILLIGRAM(S): at 17:48

## 2017-03-28 RX ADMIN — PRAMIPEXOLE DIHYDROCHLORIDE 0.12 MILLIGRAM(S): 0.12 TABLET ORAL at 14:43

## 2017-03-28 RX ADMIN — Medication 650 MILLIGRAM(S): at 21:51

## 2017-03-28 RX ADMIN — Medication 145 MILLIGRAM(S): at 11:56

## 2017-03-28 RX ADMIN — Medication 240 MILLIGRAM(S): at 05:12

## 2017-03-28 RX ADMIN — Medication 100 MILLIGRAM(S): at 11:56

## 2017-03-28 RX ADMIN — Medication 1 APPLICATION(S): at 17:43

## 2017-03-28 RX ADMIN — Medication 0.5 MILLIGRAM(S): at 07:50

## 2017-03-28 RX ADMIN — Medication 0.5 MILLIGRAM(S): at 19:43

## 2017-03-28 RX ADMIN — PANTOPRAZOLE SODIUM 10 MG/HR: 20 TABLET, DELAYED RELEASE ORAL at 04:08

## 2017-03-28 RX ADMIN — FINASTERIDE 5 MILLIGRAM(S): 5 TABLET, FILM COATED ORAL at 11:56

## 2017-03-28 RX ADMIN — Medication 1 GRAM(S): at 05:14

## 2017-03-28 NOTE — PROGRESS NOTE ADULT - SUBJECTIVE AND OBJECTIVE BOX
CHIEF COMPLAINT: GIB    SUBJECTIVE:   Doing well. happy with eating more food. wore CPAP overnight. no noted blood in stool per patient    REVIEW OF SYSTEMS:  CONSTITUTIONAL: No weakness, fevers or chills  RESPIRATORY: No cough, wheezing, hemoptysis; No shortness of breath  CARDIOVASCULAR: No chest pain or palpitations  All other review of systems is negative unless indicated above    Vital Signs Last 24 Hrs  T(C): 37, Max: 37.2 (03-28 @ 09:56)  T(F): 98.6, Max: 98.9 (03-28 @ 09:56)  HR: 98 (83 - 107)  BP: 150/37 (134/39 - 168/38)  BP(mean): --  RR: 18 (17 - 18)  SpO2: 98% (96% - 98%)    I&O's Summary    I & Os for current day (as of 28 Mar 2017 12:35)  =============================================  IN: 571 ml / OUT: 500 ml / NET: 71 ml      PHYSICAL EXAM:    Constitutional: NAD, awake and alert, well-developed  HEENT: PERR, EOMI, Normal Hearing, MMM  Neck: Soft and supple, No LAD, No JVD  Respiratory: Breath sounds are clear bilaterally, No wheezing, rales or rhonchi  Cardiovascular: S1 and S2, regular rate and rhythm, no Murmurs, gallops or rubs  Gastrointestinal: Bowel Sounds present, soft  Extremities: No peripheral edema  Neurological: A/O x 3, no focal deficits  Musculoskeletal: moves all extrem  Skin: BLE bandages with ulcerations improving per family    MEDICATIONS:  MEDICATIONS  (STANDING):  finasteride 5milliGRAM(s) Oral daily  buDESOnide   0.5 milliGRAM(s) Respule 0.5milliGRAM(s) Nebulizer two times a day  doxazosin 8milliGRAM(s) Oral at bedtime  isosorbide   mononitrate ER Tablet (IMDUR) 120milliGRAM(s) Oral daily  gabapentin Oral Tab/Cap - Peds 600milliGRAM(s) Oral at bedtime  allopurinol 100milliGRAM(s) Oral daily  loratadine 10milliGRAM(s) Oral daily  fenofibrate Tablet 145milliGRAM(s) Oral daily  simvastatin 10milliGRAM(s) Oral at bedtime  pramipexole 0.125milliGRAM(s) Oral three times a day  metoprolol Oral Tab/Cap - Peds 12.5milliGRAM(s) Oral two times a day  hydrALAZINE 150milliGRAM(s) Oral two times a day  ammonium lactate 12% Lotion 1Application(s) Topical two times a day  diltiazem   CD 240milliGRAM(s) Oral daily  senna 2Tablet(s) Oral at bedtime  glycerin Suppository - Adult 1Suppository(s) Rectal daily  acetaminophen   Tablet 650milliGRAM(s) Oral every 6 hours  pantoprazole Infusion 8mG/Hr IV Continuous <Continuous>  sucralfate 1Gram(s) Oral four times a day  dextrose 5%. 1000milliLiter(s) IV Continuous <Continuous>  fluticasone propionate 50 MICROgram(s)/spray Nasal Spray 1Spray(s) Both Nostrils two times a day  furosemide   Injectable 40milliGRAM(s) IV Push daily      LABS: All Labs Reviewed:                        6.8    9.0   )-----------( 226      ( 28 Mar 2017 06:03 )             20.8     28 Mar 2017 06:03    149    |  117    |  82     ----------------------------<  125    4.3     |  24     |  1.98     Ca    7.8        28 Mar 2017 06:03        82 y/o male with PMHx of COPD, CHF, chronic renal failure, paroxysmal A-fib.with last hospitaliztion 10/2017 for hematochezia s/p bleeding scan and flex sigmoidoscopy  was BIBA for acute worsening lower back pain radiating to buttock to foot.  In Ed patient noted to have Hb 6 and stool guaiac positive  and rectal exam- with cookie  done by ED physician as per note.    Pt was admitted to telemetry and anemia was evaluated with EGD/colonscopy that revealed  non-bleeding ulcers (one with eschar) and colonic mass. He required 8 units PRBCs to maintain Hb>7. He was also evaluated by vascular and wound care for BLE vascular ulcerations. His unna boots were stopped and dressings applied daily. He also had whirlpool baths of his BLE.     Anticipate discharge  when Hb can remain stable. Tentative plan is for rehab for PT and continued wound care.      Assessment and Plan:   		  82 y/o male with PMHx of COPD, CHF, chronic renal failure, paroxysmal A-fib.with last hospitalization 10/2017 for hematochezia s/p bleeding scan and flex sigmoidoscopy that is admitted for low hgb likely secondary to GIB from gastric ulcer.      Problem/Plan - 1: Acute Blood Loss Anemia due to UGIB associated with Gastric Ulcer  -Hb 6.3 on arrival, s/p 7 units PRBCs per nursing. Hb low this AM, so will transfuse unit 8  -EGD:4 non-bleeding cratered ulcers fundus/curvature of gastric body, the largest 15mm in dimension with eschar not amenable to intervention  -Colon: nonobstructing 3 cmx 7cm mass in transverse colon, no bleeding. limited prep  -Protonix gtt, to continue per GI, carafate added  -Diet advanced to low fiber.   -hold iron given confounding dark stool and iron load with PRBCs (iron low per labs)    Problem/Plan - 2: SHAYY with chronic hypoxic, hypercapnic respiratory failure superimposed on COPD  -Bipap while asleep, though pt did not wear for 2 days  -Daytime O2 at 2LNC  -no longer smoking      Problem/Plan - 3: BLE venous stasis ulcers  -PTA, wound care with Dr Cardenas with xeroform, kerlix and ace bandages for dressings.    -Evaluation by vascular this admission  -wound care nurse evaluation: LLE with erythema and scattered ulcerations draining tan discharge with foul odor. RLE with erythema and scattered ulcerations draining tan discharge with foul odor. RLE with an ulceration to the lateral malleolus with tan tissue measuring [id number]. Dopplers used to auscultate dorsalis pedal pulses, L > R.   -wound care recs: At this time, patient would greatly benefit from a whirlpool treatment to both lower extremities to remove debris and assist with odor control. Patient remains in his [employer] 9000 MRS on his backside until he whirlpool arrives. 1) Daily Whirlpool treatments with 2 drops of hibiclens 2) After whirlpool apply Xeroform, kerlix and ace bandage. 3) Elevate extremities off mattress. 4) Turn and position every 2 hours  -investigate surgical shoes for feet (ordered via nursing). currently wearing cut crocs.    Problem/Plan - 4: ARYA on CKD IV   -cr 3.5 on arrival, now 1.98  -lasix resumed  -renal also following    Problem/Plan - 5: Essential hypertension  -c/w current management BP stable over course of admission.     Problem/Plan - 6: Hypovolemic Hypernatremia, hyperchloremia  -Diet advanced, encourage free water  -D5W overnight, but will stop with renal starting lasix  -defer electrolyte mgmt to renal    Problem/Plan - 7: Spinal stenosis  -Gabapentin  -PT eval for lower extremity weakness- rehab recommended      Problem/Plan - 8:Paroxysmal Atrial Fibrillation  -rate control with metoprolol, diltiazem  -no amiodarone due to underlying lung capacity  -CHADS2=2.  no AC given GIB    Problem/Plan - 9:Morbid Obesity  -BMI 37 by chart, but likely he needs accurate weight recorded  -high hip: waste ratio  -nutrition consult when diet advanced  -pt not open to changing diet as multiple complex carbs already at bedside in anticipation of advanced diet (cookies, rolls, crackers, donuts)    Problem/Plan - 10: Coronary artery disease  -Chronic diastolic CHF, CAD s/p PCI with stent in 2002, Last cath in july 2014 with RCA disease   -BBlockers, fibrates, statin  -no ASA due to bleeding

## 2017-03-28 NOTE — PROGRESS NOTE ADULT - ASSESSMENT
82 y/o male with PMHx of COPD, CHF, chronic renal failure, paroxysmal A-fib.with last hospitaliztion 10/2017 for hematochezia s/p bleeding scan and flex sigmoidoscopy  was BIBA  now being worked up for GI bleed and has required multiple 4-5 units PRBCs done  now needs eval for upper and likley lower endoscopy  Chronic hypoxemic resp failure  CHF appears stable  P Afib now in nsr  hemodynamically stabe  SHAYY / OHS /COPD-overlap syndrome  suggest ABG and repeat cxr today to complete her workup  I've explained to pt and his DTR, critical care RN that pt is at incresaed risk of resp failure requiring mechanical ventilation with sedation, but his pulm status appears optimized presently for this indicated procedure with recent requirement to get muliple prbcs over last few days  recommend  ABG noted  cxr is clear  cont nocturnal BIPAP  Anesthesia veal before his procedure  cardiac eval in progress  will check outpt pfts, placed in chart  ABG noted  FEV1 6/2016 1.38, 68% predicted  DLCO 45% predicted  supportive care  BIPAP 12/8 with o2 2 liters attached  plan as per GI  cont current rx  supportive care required  Add flonase nasal spray   all recent data discussed with pt today 3/28

## 2017-03-28 NOTE — PROGRESS NOTE ADULT - ASSESSMENT
- ARYA/CKD  stage 4 (scr 2.5) due to volume depletion, will hold lasix for now and fu response to transfusion.  fu AM labs send,   - Anemia:  s/p egd and colonscopy, with ulcers.  likely source of bleed.  fu the trend of hgb.  would dc with hgb close to 9 with transfusion as needed.  - Hypernatremia: po fluid intake    3/25   - CKD stage 4: stable.  hold lasix standing dose and only prn as needed  - Hypernatremia: po fluid intake  - GI bleed: transfusion with lasix iv x1    3/26 MK  - CKD stage 4 with mild hypernatremia: hold iv lasix and encourage moderate po fluid intake.    - GI bleed: fu trend of the hgb.  on protonix drip.    3/27   - CKD stage 4, stable restart lasix upon dc.  of goes to rehab would restart at lasix 40 mg qd.  if goes to home, lasix 40 mg alternating with lasix 80 mg.    - GI bleed, advance diet and carafate   - Hypernatremia: moniter on advanced po fluid intake    3/28 SY  --CKD with stable parameters.  Will resume diuretics.  IV lasix until d/c to rehab--then change to po.  --LE edema.  bilateral dressings in place.  Resume diuretics  --Serum sodium acceptable.  Continue to monitor.  --GIB   HGB lower today.  post transfusion.

## 2017-03-28 NOTE — PROGRESS NOTE ADULT - ASSESSMENT
1. Anemia- Management pre primary team and GI team. GI following- s/p EGD/colon- large gastric ulcers. WIll continue to hold antiplatelet therapy and any anticoagulation.Continue cardizem as tolerated by the HR and BP. Hgb 6.8 today. Will need transfusion to keep HGB > 8 followed by IV lasix. Continue to follow CBC closely and transfuse as needed. Will need GI f/u- surgical candidate?      2. CAD, s/p PCI and atherectomy- pt is off anticoagulation and antiplatelet agents. Benefits outweigh risks with antiplatelet agents now.  Continue statin.     3. HTN- continue current meds.    4. Afib- currently in SR. Can D/C tele for now. Not on anticoagulation at this time. Per pulmonary team pt is not an optimal candidate for amiodarone since he has already low lung reserve.    5. Mechanical DVT proph.

## 2017-03-28 NOTE — PROGRESS NOTE ADULT - SUBJECTIVE AND OBJECTIVE BOX
NEPHROLOGY INTERVAL HPI/OVERNIGHT EVENTS:  No specific complaints today.  Feeling fair.. No SOB.  HPI:  HPI: :: 82 y/o male with PMHx of COPD, CHF, chronic renal failure, paroxysmal A-fib.with last hospitaliztion 10/2017 for hematochezia s/p bleeding scan and flex sigmoidoscopy  was BIBA for acute worsening lower back pain radiating to buttock to foot   patient with amber boots for chronic venous stasis ulcers and lower extremity chronic edema ,daughter reports increased oozing from ulcers in lai past few weeks,no fever  In Ed patient noted to have Hb 6 and stool guaiac positive  and rectal exam- with cookie  done by ED physician as per note   Patient denies any chest pain or worsening of SOB ,no abdominal pain or vomiting ,no diarrhea,no efver or chills  PMHx: ::  HTN,  COPD on home O2 2L,  SHAYY on nocturnal BIPAP,  Chronic diastolic CHF,  CAD s/p PCI with stent in ,  Last cath in 2014 with RCA disease medically managed, Hyperlipidemia,  PVD,  Iron deficiency anemia,  Chronic back pain,  Gout,  BPH,  CKD III with baseline Cr 2.2,  PAF not on a/c,  Hx of GI Bleed in the past, hemorrhoids s/p banding,  PSHx: ::  Right rotator cuff repair  pilonidal cyst  Family History: ::  Father: rectal CA  Mother: HTN, DM  3 siblings: DM  Social History: ::  Tobacco: smoked 1ppd X 50 years, quit 22 years ago.  Rare ETOH use.  wife recently passed away last year (21 Mar 2017 06:35)          MEDICATIONS  (STANDING):  finasteride 5milliGRAM(s) Oral daily  buDESOnide   0.5 milliGRAM(s) Respule 0.5milliGRAM(s) Nebulizer two times a day  doxazosin 8milliGRAM(s) Oral at bedtime  isosorbide   mononitrate ER Tablet (IMDUR) 120milliGRAM(s) Oral daily  gabapentin Oral Tab/Cap - Peds 600milliGRAM(s) Oral at bedtime  allopurinol 100milliGRAM(s) Oral daily  loratadine 10milliGRAM(s) Oral daily  fenofibrate Tablet 145milliGRAM(s) Oral daily  simvastatin 10milliGRAM(s) Oral at bedtime  pramipexole 0.125milliGRAM(s) Oral three times a day  metoprolol Oral Tab/Cap - Peds 12.5milliGRAM(s) Oral two times a day  hydrALAZINE 150milliGRAM(s) Oral two times a day  ammonium lactate 12% Lotion 1Application(s) Topical two times a day  diltiazem   CD 240milliGRAM(s) Oral daily  senna 2Tablet(s) Oral at bedtime  glycerin Suppository - Adult 1Suppository(s) Rectal daily  acetaminophen   Tablet 650milliGRAM(s) Oral every 6 hours  pantoprazole Infusion 8mG/Hr IV Continuous <Continuous>  sucralfate 1Gram(s) Oral four times a day  dextrose 5%. 1000milliLiter(s) IV Continuous <Continuous>  fluticasone propionate 50 MICROgram(s)/spray Nasal Spray 1Spray(s) Both Nostrils two times a day    MEDICATIONS  (PRN):  nitroglycerin     SubLingual 0.4milliGRAM(s) SubLingual every 5 minutes PRN Chest Pain  ondansetron Injectable 4milliGRAM(s) IV Push every 6 hours PRN Nausea and/or Vomiting  diphenhydrAMINE   Elixir 12.5milliGRAM(s) Oral every 6 hours PRN Rash and/or Itching          Vital Signs Last 24 Hrs  T(C): 37.2, Max: 37.2 ( @ 09:56)  T(F): 98.9, Max: 98.9 ( @ 09:56)  HR: 98 (83 - 107)  BP: 146/38 (134/39 - 168/38)  BP(mean): --  RR: 18 (17 - 18)  SpO2: 98% (96% - 98%)  Daily     Daily Weight in k.7 (28 Mar 2017 09:56)    I & Os for current day (as of  @ 11:26)  =============================================  IN: 571 ml / OUT: 500 ml / NET: 71 ml      PHYSICAL EXAM:  Alert and appropriate  GENERAL: no apparent distress  CHEST/LUNG: fair air entry  HEART: S1S2 RRR  ABDOMEN: obese  EXTREMITIES: 3+ edema in dressings.  SKIN:     LABS:                        6.8    9.0   )-----------( 226      ( 28 Mar 2017 06:03 )             20.8     28 Mar 2017 06:03    149    |  117    |  82     ----------------------------<  125    4.3     |  24     |  1.98     Ca    7.8        28 Mar 2017 06:03                  RADIOLOGY & ADDITIONAL TESTS:

## 2017-03-28 NOTE — PROGRESS NOTE ADULT - ASSESSMENT
anemia  likely multifactorial with GIB and redal dx, Acdx    multiple ulcers in sotm, diff including CA dw pt and family   no bx due to inc risk of bleeding and likely not surgical cadidate, Daughter agrees    cont PPI gtt  serial HCT  cont carafate  advanced diet and vida  follow HCT          serial Hct, keep hgb over 8      copd, home o2  CAD, on ASA    ASA stopped      constipation, recent, self disimpaction  laxatives  cont tx      LBP avoid NSAIDs    COPD and SHAYY  cont tx

## 2017-03-28 NOTE — PROGRESS NOTE ADULT - SUBJECTIVE AND OBJECTIVE BOX
Cardiology Progress Note:    HPI:  81 y/o male with PMHx of COPD, CHF, chronic renal failure, paroxysmal A-fib.with last hospitaliztion 10/2017 for hematochezia s/p bleeding scan and flex sigmoidoscopy  was BIBA for acute worsening lower back pain radiating to buttock to foot. Patient with amber boots for chronic venous stasis ulcers and lower extremity chronic edema ,daughter reports increased oozing from ulcers in the past few weeks, no fever.     3/25- No CP/SOB. No fevers. SR on tele.    3/26- Pt transfused another unit pRBCs yesterday- hgb 7.4 now. No CP/SOB. SR on tele.     3/27- No events last pm. No new complaints. No CP/SOB.     3/28- No CP/SOB. Hgb today 6.8. No new complaints. SR on tele.     Family History: Father: rectal CA  Mother: HTN, DM  3 siblings: DM    Social History: ::  Tobacco: smoked 1ppd X 50 years, quit 22 years ago.  Rare ETOH use.  wife recently passed away last year (21 Mar 2017 06:35)    PAST MEDICAL & SURGICAL HISTORY:  Sepsis, due to unspecified organism: 2/2 poorly healing wounds b/l  BPH (benign prostatic hypertrophy)  Hyperlipemia  Coronary artery disease  Hypertension  Dyspepsia: On moderate exertion.  Sleep apnea, obstructive: Requires home 02 therapy, and treatment with BIPAP  Atelectasis  Pleural effusion, bilateral  Respiratory failure  Peripheral edema  CRI (chronic renal insufficiency)  Gout  CRF (chronic renal failure)  Benign prostatic hypertrophy  Spinal stenosis  Hypercholesterolemia  GERD (gastroesophageal reflux disease)  CAD (coronary artery disease)  Hypertension  S/P angioplasty with stent  Cataract of left eye  Prostate: Surgery green light procedure.  S/P rotator cuff surgery: Right  S/P angioplasty  Rotator cuff tear, right: repair    MEDICATIONS  (STANDING):  pantoprazole Infusion 8mG/Hr IV Continuous <Continuous>  finasteride 5milliGRAM(s) Oral daily  buDESOnide   0.5 milliGRAM(s) Respule 0.5milliGRAM(s) Nebulizer two times a day  aspirin enteric coated 81milliGRAM(s) Oral daily  doxazosin 8milliGRAM(s) Oral at bedtime  isosorbide   mononitrate ER Tablet (IMDUR) 120milliGRAM(s) Oral daily  diltiazem    Tablet 120milliGRAM(s) Oral two times a day  gabapentin Oral Tab/Cap - Peds 600milliGRAM(s) Oral at bedtime  gabapentin 300milliGRAM(s) Oral daily  allopurinol 100milliGRAM(s) Oral daily  loratadine 10milliGRAM(s) Oral daily  fenofibrate Tablet 145milliGRAM(s) Oral daily  simvastatin 10milliGRAM(s) Oral at bedtime  pramipexole 0.125milliGRAM(s) Oral three times a day  metoprolol Oral Tab/Cap - Peds 12.5milliGRAM(s) Oral two times a day  famotidine    Tablet 20milliGRAM(s) Oral at bedtime  ferrous    sulfate 325milliGRAM(s) Oral three times a day with meals  hydrALAZINE 150milliGRAM(s) Oral two times a day  pantoprazole    Tablet 40milliGRAM(s) Oral two times a day before meals  ammonium lactate 12% Lotion 1Application(s) Topical two times a day  heparin  Injectable 5000Unit(s) SubCutaneous every 8 hours    MEDICATIONS  (PRN):  nitroglycerin     SubLingual 0.4milliGRAM(s) SubLingual every 5 minutes PRN Chest Pain    Allergies  No Known Allergies    Intolerances    Vital Signs Last 24 Hrs  T(C): 36.6, Max: 36.7 (03-20 @ 22:39)  T(F): 97.9, Max: 98.1 (03-20 @ 22:39)  HR: 95 (90 - 112)  BP: 121/53 (121/53 - 140/63)  BP(mean): --  RR: 18 (16 - 18)  SpO2: 93% (93% - 100%)    REVIEW OF SYSTEMS:    CONSTITUTIONAL:  As per HPI.  HEENT:  Eyes:  No diplopia or blurred vision. ENT:  No earache, sore throat or runny nose.  CARDIOVASCULAR:  No pressure, squeezing, strangling, tightness, heaviness or aching about the chest, neck, axilla or epigastrium.  RESPIRATORY:  No cough, shortness of breath, PND or orthopnea.  GASTROINTESTINAL:  No nausea, vomiting or diarrhea.  GENITOURINARY:  No dysuria, frequency or urgency.  MUSCULOSKELETAL:  As per HPI.  SKIN:  No change in skin, hair or nails.  NEUROLOGIC:  No paresthesias, fasciculations, seizures or weakness.  PSYCHIATRIC:  No disorder of thought or mood.  ENDOCRINE:  No heat or cold intolerance, polyuria or polydipsia.  HEMATOLOGICAL:  No easy bruising or bleedings:  .     PHYSICAL EXAMINATION:    GENERAL APPEARANCE:  Pt. is not currently dyspneic, in no distress. Pt. is alert, oriented, and pleasant.  HEENT:  Pupils are normal and react normally. No icterus. Mucous membranes well colored.  NECK:  Supple. No lymphadenopathy. Jugular venous pressure not elevated. Carotids equal.   HEART:   The cardiac impulse has a normal quality. There are no murmurs, rubs or gallops noted  CHEST:  Chest is clear to auscultation. Normal respiratory effort.  ABDOMEN:  Soft and nontender.   EXTREMITIES:  There is no edema.   SKIN:  No rash or significant lesions are noted.    I&O's Summary    LABS:                        6.3    8.6   )-----------( 337      ( 20 Mar 2017 22:39 )             20.0   20 Mar 2017 22:39    147    |  114    |  114    ----------------------------<  124    4.4     |  23     |  3.51     Ca    8.1        20 Mar 2017 22:39    TPro  5.7    /  Alb  2.5    /  TBili  0.2    /  DBili  x      /  AST  19     /  ALT  14     /  AlkPhos  34     20 Mar 2017 22:39  LIVER FUNCTIONS - ( 20 Mar 2017 22:39 )  Alb: 2.5 g/dL / Pro: 5.7 gm/dL / ALK PHOS: 34 U/L / ALT: 14 U/L / AST: 19 U/L / GGT: x           PT/INR - ( 20 Mar 2017 22:39 )   PT: 11.3 sec;   INR: 1.03 ratio       PTT - ( 20 Mar 2017 22:39 )  PTT:33.6 secCARDIAC MARKERS ( 21 Mar 2017 03:42 )  0.018 ng/mL / x     / 107 U/L / x     / x        TELEMETRY: Normal sinus rhythm with no tachy or nelly events    RADIOLOGY- CXR- 3/23- [facility]    ASSESSMENT/PLAN-

## 2017-03-28 NOTE — PROGRESS NOTE ADULT - SUBJECTIVE AND OBJECTIVE BOX
Patient is a 82y old  Male who presents with a chief complaint of left leg pain radiating from left buttoch and thigh (21 Mar 2017 06:35)      HPI:  HPI: :: 80 y/o male with PMHx of COPD, CHF, chronic renal failure, paroxysmal A-fib.with last hospitaliztion 10/2017 for hematochezia s/p bleeding scan and flex sigmoidoscopy  was BIBA for acute worsening lower back pain radiating to buttock to foot   patient with amber boots for chronic venous stasis ulcers and lower extremity chronic edema ,daughter reports increased oozing from ulcers in lai past few weeks,no fever  In Ed patient noted to have Hb 6 and stool guaiac positive  and rectal exam- with cookie  done by ED physician as per note   Patient denies any chest pain or worsening of SOB ,no abdominal pain or vomiting ,no diarrhea,no efver or chills  seen and evaluated along with Dr SANTIZO  His dtr is contacted as well    all his recent data and PFT data obtained and discussed with his cardiologist today 3/24  pt is scheduled for his GI workup today    3/27  pt is seen and examined and feels better  asks for food  no further transfusions required  no distress  tolerated procedure well  no co  no sob  3/28  asks about going home  some nasal congestion  no co  no difficulty bretahing  uses bipap at night  PMHx: ::  HTN,  COPD on home O2 2L,  SHAYY on nocturnal BIPAP,  Chronic diastolic CHF,  CAD s/p PCI with stent in 2002,  Last cath in july 2014 with RCA disease medically managed, Hyperlipidemia,  PVD,  Iron deficiency anemia,  Chronic back pain,  Gout,  BPH,  CKD III with baseline Cr 2.2,  PAF not on a/c,  Hx of GI Bleed in the past, hemorrhoids s/p banding,  PSHx: ::  Right rotator cuff repair  pilonidal cyst  Family History: ::  Father: rectal CA  Mother: HTN, DM  3 siblings: DM  Social History: ::  Tobacco: smoked 1ppd X 50 years, quit 22 years ago.  Rare ETOH use.  wife recently passed away last year (21 Mar 2017 06:35)      PAST MEDICAL & SURGICAL HISTORY:  Sepsis, due to unspecified organism: 2/2 poorly healing wounds b/l  BPH (benign prostatic hypertrophy)  Hyperlipemia  Coronary artery disease  Hypertension  Dyspepsia: On moderate exertion.  Sleep apnea, obstructive: Requires home 02 therapy, and treatment with BIPAP  Atelectasis  Pleural effusion, bilateral  Respiratory failure  Peripheral edema  CRI (chronic renal insufficiency)  Gout  CRF (chronic renal failure)  Benign prostatic hypertrophy  Spinal stenosis  Hypercholesterolemia  GERD (gastroesophageal reflux disease)  CAD (coronary artery disease)  Hypertension  S/P angioplasty with stent  Cataract of left eye  Prostate: Surgery green light procedure.  S/P rotator cuff surgery: Right  S/P angioplasty  Rotator cuff tear, right: repair      PREVIOUS DIAGNOSTIC TESTING:    MEDICATIONS  (STANDING):  finasteride 5milliGRAM(s) Oral daily  buDESOnide   0.5 milliGRAM(s) Respule 0.5milliGRAM(s) Nebulizer two times a day  doxazosin 8milliGRAM(s) Oral at bedtime  isosorbide   mononitrate ER Tablet (IMDUR) 120milliGRAM(s) Oral daily  gabapentin Oral Tab/Cap - Peds 600milliGRAM(s) Oral at bedtime  allopurinol 100milliGRAM(s) Oral daily  loratadine 10milliGRAM(s) Oral daily  fenofibrate Tablet 145milliGRAM(s) Oral daily  simvastatin 10milliGRAM(s) Oral at bedtime  pramipexole 0.125milliGRAM(s) Oral three times a day  metoprolol Oral Tab/Cap - Peds 12.5milliGRAM(s) Oral two times a day  hydrALAZINE 150milliGRAM(s) Oral two times a day  ammonium lactate 12% Lotion 1Application(s) Topical two times a day  diltiazem   CD 240milliGRAM(s) Oral daily  senna 2Tablet(s) Oral at bedtime  glycerin Suppository - Adult 1Suppository(s) Rectal daily  acetaminophen   Tablet 650milliGRAM(s) Oral every 6 hours  pantoprazole Infusion 8mG/Hr IV Continuous <Continuous>  sucralfate 1Gram(s) Oral four times a day    MEDICATIONS  (PRN):  nitroglycerin     SubLingual 0.4milliGRAM(s) SubLingual every 5 minutes PRN Chest Pain  ondansetron Injectable 4milliGRAM(s) IV Push every 6 hours PRN Nausea and/or Vomiting  diphenhydrAMINE   Elixir 12.5milliGRAM(s) Oral every 6 hours PRN Rash and/or Itching        FAMILY HISTORY:  No pertinent family history in first degree relatives          REVIEW OF SYSTEM:  Pertinent items are noted in HPI.  Constitutional negative for chills, fevers, sweats and weight loss  throat, and face:  negative for epistaxis, nasal congestion, sore throat and   tinnitus  Respiratory: negative for cough,pos  dyspnea on exertion,no pleuritic chest pain  and wheezing  Cardiovascular:  negative for chest pain, dyspnea and palpitations  Gastrointestinal: negative for abdominal pain, diarrhea, nausea and vomiting  Genitourinary: negative for dysuria, frequency and urinary incontinence  Skin:  negative for redness, rash, pruritus, swelling, dryness and   fissures  Hematologic/lymphatic: negative for bleeding and easy bruising  Musculoskeletal: negative for arthralgias, back pain and muscle weakness  Neurological: negative for dizziness, headaches, seizures and tremors  Behavioral/Psych:  negative for mood change, depression, suicidal attempts    Allergic/Immunologic: negative for anaphylaxis, angioedema and urticaria    Vital Signs Last 24 Hrs  T(C): 36.7, Max: 37 (03-27 @ 10:05)  T(F): 98, Max: 98.6 (03-27 @ 10:05)  HR: 83 (82 - 96)  BP: 156/42 (150/34 - 168/38)  BP(mean): --  RR: 18 (16 - 18)  SpO2: 96% (96% - 98%)  PHYSICAL EXAM  General Appearance: cooperative, no acute distress,   HEENT: PERRL, conjunctiva clear, EOM's intact, non injected pharynx, no exudate, TM   normal  Neck: Supple, , no adenopathy, thyroid: not enlarged, no carotid bruit or JVD  Back: Symmetric, no  tenderness,no soft tissue tenderness  Lungs: Clear to auscultation bilateral,no adventitious breath sounds, normal   expiratory phase  Heart: Regular rate and rhythm, S1, S2 normal, no murmur, rub or gallop  Abdomen: Soft, non-tender, bowel sounds active , no hepatosplenomegaly  Extremities: no cyanosis or edema, no joint swelling  Skin: Skin color, texture normal, no rashes   Neurologic: Alert and oriented X3 , cranial nerves intact, sensory and motor normal,    ECG:    LABS:                                   7.7    8.8   )-----------( 239      ( 27 Mar 2017 05:49 )             24.0       27 Mar 2017 05:49    151    |  117    |  77     ----------------------------<  110    4.1     |  23     |  1.87     Ca    8.0        27 Mar 2017 05:49                 8.2    x     )-----------( x        ( 23 Mar 2017 07:54 )             25.4     23 Mar 2017 05:00    151    |  118    |  107    ----------------------------<  113    4.5     |  23     |  2.50     Ca    7.8        23 Mar 2017 05:00    TPro  5.7    /  Alb  2.4    /  TBili  0.5    /  DBili  x      /  AST  23     /  ALT  11     /  AlkPhos  35     21 Mar 2017 09:55    CARDIAC MARKERS ( 21 Mar 2017 17:23 )  0.028 ng/mL / x     / 196 U/L / x     / x      CARDIAC MARKERS ( 21 Mar 2017 09:55 )  0.023 ng/mL / x     / 172 U/L / x     / x            Pro BNP  1025 03-21 @ 09:55  D Dimer  -- 03-21 @ 09:55    PT/INR - ( 21 Mar 2017 09:55 )   PT: 11.9 sec;   INR: 1.08 ratio         PTT - ( 21 Mar 2017 09:55 )  PTT:35.5 sec  INTERPRETATION:  Exam Date: 3/21/2017 7:33 AM    History: Back pain    Technique: Single frontal portable view of the chest with comparison to    10/20/2016    Findings:    Studies limited by rotation.    The heart is enlarged. No focal consolidation. The apices and   hemidiaphragms are unremarkable. Degenerative changes of the visualized   osseous structures.        Impression:EXAM:  CHEST SINGLE VIEW FRONTAL                            PROCEDURE DATE:  03/23/2017        INTERPRETATION:  Views:1  Comparison: 2 days ago  History: CHF    The lungs are clear of infiltrates and effusions.     The cardiac and   mediastinal contours appear unremarkable.     Impression:  1. No evidence of acute pulmonary disease.      Cardiomegaly          ABG - ( 23 Mar 2017 11:07 )  pH: 7.40  /  pCO2: 35    /  pO2: 109   / HCO3: 21    / Base Excess: -3    /  SaO2: 99                RADIOLOGY & ADDITIONAL STUDIES:

## 2017-03-28 NOTE — PROGRESS NOTE ADULT - SUBJECTIVE AND OBJECTIVE BOX
Patient is a 82y old  Male who presents with a chief complaint of left leg pain radiating from left buttoch and thigh (21 Mar 2017 06:35)      HPI:  HPI: :: 82 y/o male with PMHx of COPD, CHF, chronic renal failure, paroxysmal A-fib.with last hospitaliztion 10/2017 for hematochezia s/p bleeding scan and flex sigmoidoscopy  was BIBA for acute worsening lower back pain radiating to buttock to foot   patient with amber boots for chronic venous stasis ulcers and lower extremity chronic edema ,daughter reports increased oozing from ulcers in lai past few weeks,no fever  In Ed patient noted to have Hb 6 and stool guaiac positive  and rectal exam- with cookie  done by ED physician as per note   Patient denies any chest pain or worsening of SOB ,no abdominal pain or vomiting ,no diarrhea,no efver or chills    pt comf  mild n  vida tf  mild lower abd gassy pain that resolved after flatus  dark BM  neg cp or sob      PMHx: ::  HTN,  COPD on home O2 2L,  SHAYY on nocturnal BIPAP,  Chronic diastolic CHF,  CAD s/p PCI with stent in 2002,  Last cath in july 2014 with RCA disease medically managed, Hyperlipidemia,  PVD,  Iron deficiency anemia,  Chronic back pain,  Gout,  BPH,  CKD III with baseline Cr 2.2,  PAF not on a/c,  Hx of GI Bleed in the past, hemorrhoids s/p banding,  PSHx: ::  Right rotator cuff repair  pilonidal cyst  Family History: ::  Father: rectal CA  Mother: HTN, DM  3 siblings: DM  Social History: ::  Tobacco: smoked 1ppd X 50 years, quit 22 years ago.  Rare ETOH use.  wife recently passed away last year (21 Mar 2017 06:35)      PAST MEDICAL & SURGICAL HISTORY:  Sepsis, due to unspecified organism: 2/2 poorly healing wounds b/l  BPH (benign prostatic hypertrophy)  Hyperlipemia  Coronary artery disease  Hypertension  Dyspepsia: On moderate exertion.  Sleep apnea, obstructive: Requires home 02 therapy, and treatment with BIPAP  Atelectasis  Pleural effusion, bilateral  Respiratory failure  Peripheral edema  CRI (chronic renal insufficiency)  Gout  CRF (chronic renal failure)  Benign prostatic hypertrophy  Spinal stenosis  Hypercholesterolemia  GERD (gastroesophageal reflux disease)  CAD (coronary artery disease)  Hypertension  S/P angioplasty with stent  Cataract of left eye  Prostate: Surgery green light procedure.  S/P rotator cuff surgery: Right  S/P angioplasty  Rotator cuff tear, right: repair      MEDICATIONS  (STANDING):  finasteride 5milliGRAM(s) Oral daily  buDESOnide   0.5 milliGRAM(s) Respule 0.5milliGRAM(s) Nebulizer two times a day  doxazosin 8milliGRAM(s) Oral at bedtime  isosorbide   mononitrate ER Tablet (IMDUR) 120milliGRAM(s) Oral daily  gabapentin Oral Tab/Cap - Peds 600milliGRAM(s) Oral at bedtime  allopurinol 100milliGRAM(s) Oral daily  loratadine 10milliGRAM(s) Oral daily  fenofibrate Tablet 145milliGRAM(s) Oral daily  simvastatin 10milliGRAM(s) Oral at bedtime  pramipexole 0.125milliGRAM(s) Oral three times a day  metoprolol Oral Tab/Cap - Peds 12.5milliGRAM(s) Oral two times a day  hydrALAZINE 150milliGRAM(s) Oral two times a day  ammonium lactate 12% Lotion 1Application(s) Topical two times a day  diltiazem   CD 240milliGRAM(s) Oral daily  senna 2Tablet(s) Oral at bedtime  glycerin Suppository - Adult 1Suppository(s) Rectal daily  acetaminophen   Tablet 650milliGRAM(s) Oral every 6 hours  pantoprazole Infusion 8mG/Hr IV Continuous <Continuous>  sucralfate 1Gram(s) Oral four times a day  dextrose 5%. 1000milliLiter(s) IV Continuous <Continuous>    MEDICATIONS  (PRN):  nitroglycerin     SubLingual 0.4milliGRAM(s) SubLingual every 5 minutes PRN Chest Pain  ondansetron Injectable 4milliGRAM(s) IV Push every 6 hours PRN Nausea and/or Vomiting  diphenhydrAMINE   Elixir 12.5milliGRAM(s) Oral every 6 hours PRN Rash and/or Itching      Allergies    No Known Allergies    Intolerances        SOCIAL HISTORY:unchanged    FAMILY HISTORY:  No pertinent family history in first degree relatives      REVIEW OF SYSTEMS:    CONSTITUTIONAL: No weakness, fevers or chills  EYES/ENT: No visual changes;  No vertigo or throat pain   NECK: No pain or stiffness  RESPIRATORY: No cough, wheezing, hemoptysis; No shortness of breath  CARDIOVASCULAR: No chest pain or palpitations  GENITOURINARY: No dysuria, frequency or hematuria  NEUROLOGICAL: No numbness or weakness  SKIN: No itching, burning, rashes, or lesions   All other review of systems is negative unless indicated above.    Vital Signs Last 24 Hrs  T(C): 36.7, Max: 37 (03-27 @ 10:05)  T(F): 98, Max: 98.6 (03-27 @ 10:05)  HR: 83 (82 - 96)  BP: 156/42 (150/34 - 168/38)  BP(mean): --  RR: 18 (16 - 18)  SpO2: 96% (96% - 98%)    PHYSICAL EXAM:    Constitutional: NAD, well-developed  HEENT: EOMI, throat clear  Neck: No LAD, supple  Respiratory: CTA and P  Cardiovascular: S1 and S2, RRR, no M  Gastrointestinal: BS+, soft, NT/ND, neg HSM,  Extremities: venous stasis with dressing over feet  Vascular: 2+ peripheral pulses  Neurological: A/O x 3, no focal deficits  Psychiatric: Normal mood, normal affect  Skin: No rashes    LABS:  CBC Full  -  ( 27 Mar 2017 05:49 )  WBC Count : 8.8 K/uL  Hemoglobin : 7.7 g/dL  Hematocrit : 24.0 %  Platelet Count - Automated : 239 K/uL  Mean Cell Volume : 95.3 fl  Mean Cell Hemoglobin : 30.5 pg  Mean Cell Hemoglobin Concentration : 32.0 gm/dL  Auto Neutrophil # : x  Auto Lymphocyte # : x  Auto Monocyte # : x  Auto Eosinophil # : x  Auto Basophil # : x  Auto Neutrophil % : x  Auto Lymphocyte % : x  Auto Monocyte % : x  Auto Eosinophil % : x  Auto Basophil % : x    27 Mar 2017 05:49    151    |  117    |  77     ----------------------------<  110    4.1     |  23     |  1.87     Ca    8.0        27 Mar 2017 05:49              RADIOLOGY & ADDITIONAL STUDIES:

## 2017-03-29 LAB
BLD GP AB SCN SERPL QL: SIGNIFICANT CHANGE UP
HCT VFR BLD CALC: 22 % — LOW (ref 39–50)
HGB BLD-MCNC: 7.1 G/DL — LOW (ref 13–17)
MCHC RBC-ENTMCNC: 30.5 PG — SIGNIFICANT CHANGE UP (ref 27–34)
MCHC RBC-ENTMCNC: 32.3 GM/DL — SIGNIFICANT CHANGE UP (ref 32–36)
MCV RBC AUTO: 94.3 FL — SIGNIFICANT CHANGE UP (ref 80–100)
PLATELET # BLD AUTO: 264 K/UL — SIGNIFICANT CHANGE UP (ref 150–400)
RBC # BLD: 2.33 M/UL — LOW (ref 4.2–5.8)
RBC # FLD: 16.5 % — HIGH (ref 10.3–14.5)
SURGICAL PATHOLOGY FINAL REPORT - CH: SIGNIFICANT CHANGE UP
TYPE + AB SCN PNL BLD: SIGNIFICANT CHANGE UP
WBC # BLD: 6.9 K/UL — SIGNIFICANT CHANGE UP (ref 3.8–10.5)
WBC # FLD AUTO: 6.9 K/UL — SIGNIFICANT CHANGE UP (ref 3.8–10.5)

## 2017-03-29 PROCEDURE — 71010: CPT | Mod: 26

## 2017-03-29 RX ADMIN — Medication 650 MILLIGRAM(S): at 06:08

## 2017-03-29 RX ADMIN — Medication 12.5 MILLIGRAM(S): at 18:42

## 2017-03-29 RX ADMIN — Medication 150 MILLIGRAM(S): at 06:09

## 2017-03-29 RX ADMIN — Medication 650 MILLIGRAM(S): at 16:29

## 2017-03-29 RX ADMIN — Medication 1 GRAM(S): at 11:59

## 2017-03-29 RX ADMIN — Medication 150 MILLIGRAM(S): at 18:42

## 2017-03-29 RX ADMIN — LORATADINE 10 MILLIGRAM(S): 10 TABLET ORAL at 11:59

## 2017-03-29 RX ADMIN — GABAPENTIN 600 MILLIGRAM(S): 400 CAPSULE ORAL at 21:56

## 2017-03-29 RX ADMIN — Medication 40 MILLIGRAM(S): at 06:10

## 2017-03-29 RX ADMIN — Medication 1 APPLICATION(S): at 17:43

## 2017-03-29 RX ADMIN — Medication 1 GRAM(S): at 00:29

## 2017-03-29 RX ADMIN — PRAMIPEXOLE DIHYDROCHLORIDE 0.12 MILLIGRAM(S): 0.12 TABLET ORAL at 21:56

## 2017-03-29 RX ADMIN — Medication 100 MILLIGRAM(S): at 11:59

## 2017-03-29 RX ADMIN — Medication 650 MILLIGRAM(S): at 09:43

## 2017-03-29 RX ADMIN — Medication 1 GRAM(S): at 21:57

## 2017-03-29 RX ADMIN — PRAMIPEXOLE DIHYDROCHLORIDE 0.12 MILLIGRAM(S): 0.12 TABLET ORAL at 06:14

## 2017-03-29 RX ADMIN — Medication 1 APPLICATION(S): at 06:14

## 2017-03-29 RX ADMIN — Medication 0.5 MILLIGRAM(S): at 19:30

## 2017-03-29 RX ADMIN — Medication 650 MILLIGRAM(S): at 21:55

## 2017-03-29 RX ADMIN — Medication 8 MILLIGRAM(S): at 00:29

## 2017-03-29 RX ADMIN — PRAMIPEXOLE DIHYDROCHLORIDE 0.12 MILLIGRAM(S): 0.12 TABLET ORAL at 15:41

## 2017-03-29 RX ADMIN — Medication 1 SPRAY(S): at 06:14

## 2017-03-29 RX ADMIN — Medication 145 MILLIGRAM(S): at 11:59

## 2017-03-29 RX ADMIN — Medication 12.5 MILLIGRAM(S): at 06:07

## 2017-03-29 RX ADMIN — FINASTERIDE 5 MILLIGRAM(S): 5 TABLET, FILM COATED ORAL at 11:59

## 2017-03-29 RX ADMIN — Medication 8 MILLIGRAM(S): at 21:55

## 2017-03-29 RX ADMIN — ISOSORBIDE MONONITRATE 120 MILLIGRAM(S): 60 TABLET, EXTENDED RELEASE ORAL at 11:58

## 2017-03-29 RX ADMIN — SIMVASTATIN 10 MILLIGRAM(S): 20 TABLET, FILM COATED ORAL at 21:56

## 2017-03-29 RX ADMIN — Medication 1 GRAM(S): at 06:09

## 2017-03-29 RX ADMIN — Medication 0.5 MILLIGRAM(S): at 07:52

## 2017-03-29 RX ADMIN — Medication 240 MILLIGRAM(S): at 06:10

## 2017-03-29 RX ADMIN — Medication 1 SPRAY(S): at 17:43

## 2017-03-29 RX ADMIN — Medication 1 GRAM(S): at 18:40

## 2017-03-29 NOTE — PROGRESS NOTE ADULT - SUBJECTIVE AND OBJECTIVE BOX
Patient is a 82y old  Male who presents with a chief complaint of left leg pain radiating from left buttoch and thigh (21 Mar 2017 06:35)      HPI:  HPI: :: 80 y/o male with PMHx of COPD, CHF, chronic renal failure, paroxysmal A-fib.with last hospitaliztion 10/2017 for hematochezia s/p bleeding scan and flex sigmoidoscopy  was BIBA for acute worsening lower back pain radiating to buttock to foot   patient with amber boots for chronic venous stasis ulcers and lower extremity chronic edema ,daughter reports increased oozing from ulcers in lai past few weeks,no fever  In Ed patient noted to have Hb 6 and stool guaiac positive  and rectal exam- with cookie  done by ED physician as per note   Patient denies any chest pain or worsening of SOB ,no abdominal pain or vomiting ,no diarrhea,no efver or chills      Hx per pt and daughter, Dominga  neg cp or sob  pos fatigue and received PRBC  denies blood in stool, remains dark    Patient is a 82y old  Male who presents with a chief complaint of left leg pain radiating from left buttoch and thigh (21 Mar 2017 06:35)      HPI:  HPI: :: 80 y/o male with PMHx of COPD, CHF, chronic renal failure, paroxysmal A-fib.with last hospitaliztion 10/2017 for hematochezia s/p bleeding scan and flex sigmoidoscopy  was BIBA for acute worsening lower back pain radiating to buttock to foot   patient with [employer]aber boots for chronic venous stasis ulcers and lower extremity chronic edema ,daughter reports increased oozing from ulcers in lai past few weeks,no fever  In Ed patient noted to have Hb 6 and stool guaiac positive  and rectal exam- with cookie  done by ED physician as per note   Patient denies any chest pain or worsening of SOB ,no abdominal pain or vomiting ,no diarrhea,no efver or chills    mild lower abd cramp prior to BM and dec withflatus  PMHx: ::  HTN,  COPD on home O2 2L,  SHAYY on nocturnal BIPAP,  Chronic diastolic CHF,  CAD s/p PCI with stent in 2002,  Last cath in july 2014 with RCA disease medically managed, Hyperlipidemia,  PVD,  Iron deficiency anemia,  Chronic back pain,  Gout,  BPH,  CKD III with baseline Cr 2.2,  PAF not on a/c,  Hx of GI Bleed in the past, hemorrhoids s/p banding,  PSHx: ::  Right rotator cuff repair  pilonidal cyst  Family History: ::  Father: rectal CA  Mother: HTN, DM  3 siblings: DM  Social History: ::  Tobacco: smoked 1ppd X 50 years, quit 22 years ago.  Rare ETOH use.  wife recently passed away last year (21 Mar 2017 06:35)      PAST MEDICAL & SURGICAL HISTORY:  Sepsis, due to unspecified organism: 2/2 poorly healing wounds b/l  BPH (benign prostatic hypertrophy)  Hyperlipemia  Coronary artery disease  Hypertension  Dyspepsia: On moderate exertion.  Sleep apnea, obstructive: Requires home 02 therapy, and treatment with BIPAP  Atelectasis  Pleural effusion, bilateral  Respiratory failure  Peripheral edema  CRI (chronic renal insufficiency)  Gout  CRF (chronic renal failure)  Benign prostatic hypertrophy  Spinal stenosis  Hypercholesterolemia  GERD (gastroesophageal reflux disease)  CAD (coronary artery disease)  Hypertension  S/P angioplasty with stent  Cataract of left eye  Prostate: Surgery green light procedure.  S/P rotator cuff surgery: Right  S/P angioplasty  Rotator cuff tear, right: repair      MEDICATIONS  (STANDING):  finasteride 5milliGRAM(s) Oral daily  buDESOnide   0.5 milliGRAM(s) Respule 0.5milliGRAM(s) Nebulizer two times a day  doxazosin 8milliGRAM(s) Oral at bedtime  isosorbide   mononitrate ER Tablet (IMDUR) 120milliGRAM(s) Oral daily  gabapentin Oral Tab/Cap - Peds 600milliGRAM(s) Oral at bedtime  allopurinol 100milliGRAM(s) Oral daily  loratadine 10milliGRAM(s) Oral daily  fenofibrate Tablet 145milliGRAM(s) Oral daily  simvastatin 10milliGRAM(s) Oral at bedtime  pramipexole 0.125milliGRAM(s) Oral three times a day  metoprolol Oral Tab/Cap - Peds 12.5milliGRAM(s) Oral two times a day  hydrALAZINE 150milliGRAM(s) Oral two times a day  ammonium lactate 12% Lotion 1Application(s) Topical two times a day  diltiazem   CD 240milliGRAM(s) Oral daily  senna 2Tablet(s) Oral at bedtime  glycerin Suppository - Adult 1Suppository(s) Rectal daily  acetaminophen   Tablet 650milliGRAM(s) Oral every 6 hours  pantoprazole Infusion 8mG/Hr IV Continuous <Continuous>  sucralfate 1Gram(s) Oral four times a day  fluticasone propionate 50 MICROgram(s)/spray Nasal Spray 1Spray(s) Both Nostrils two times a day  furosemide   Injectable 40milliGRAM(s) IV Push daily    MEDICATIONS  (PRN):  nitroglycerin     SubLingual 0.4milliGRAM(s) SubLingual every 5 minutes PRN Chest Pain  ondansetron Injectable 4milliGRAM(s) IV Push every 6 hours PRN Nausea and/or Vomiting  diphenhydrAMINE   Elixir 12.5milliGRAM(s) Oral every 6 hours PRN Rash and/or Itching      Allergies    No Known Allergies    Intolerances        SOCIAL HISTORY:    FAMILY HISTORY:  No pertinent family history in first degree relatives      REVIEW OF SYSTEMS:    CONSTITUTIONAL: No weakness, fevers or chills  EYES/ENT: No visual changes;  No vertigo or throat pain   NECK: No pain or stiffness  RESPIRATORY: No cough, wheezing, hemoptysis; No shortness of breath  CARDIOVASCULAR: No chest pain or palpitations  GENITOURINARY: No dysuria, frequency or hematuria  NEUROLOGICAL: No numbness or weakness  SKIN: No itching, burning, rashes, or lesions   All other review of systems is negative unless indicated above.    Vital Signs Last 24 Hrs  T(C): 36.8, Max: 37 (03-28 @ 11:45)  T(F): 98.2, Max: 98.6 (03-28 @ 11:45)  HR: 84 (84 - 98)  BP: 133/35 (133/35 - 153/42)  BP(mean): --  RR: 17 (17 - 18)  SpO2: 100% (97% - 100%)    PHYSICAL EXAM:    Constitutional: NAD, well-developed  HEENT: EOMI, throat clear  Neck: No LAD, supple  Respiratory: CTA and P  Cardiovascular: S1 and S2, RRR, no M  Gastrointestinal: BS+, soft, NT/ND, neg HSM,  Extremities: No peripheral edema, neg clubing, cyanosis  Vascular: 2+ peripheral pulses  Neurological: A/O x 3, no focal deficits  Psychiatric: Normal mood, normal affect  Skin: No rashes    LABS:  CBC Full  -  ( 29 Mar 2017 05:51 )  WBC Count : 6.9 K/uL  Hemoglobin : 7.1 g/dL  Hematocrit : 22.0 %  Platelet Count - Automated : 264 K/uL  Mean Cell Volume : 94.3 fl  Mean Cell Hemoglobin : 30.5 pg  Mean Cell Hemoglobin Concentration : 32.3 gm/dL  Auto Neutrophil # : x  Auto Lymphocyte # : x  Auto Monocyte # : x  Auto Eosinophil # : x  Auto Basophil # : x  Auto Neutrophil % : x  Auto Lymphocyte % : x  Auto Monocyte % : x  Auto Eosinophil % : x  Auto Basophil % : x    28 Mar 2017 06:03    149    |  117    |  82     ----------------------------<  125    4.3     |  24     |  1.98     Ca    7.8        28 Mar 2017 06:03              RADIOLOGY & ADDITIONAL STUDIES:    PMHx: ::  HTN,  COPD on home O2 2L,  SHAYY on nocturnal BIPAP,  Chronic diastolic CHF,  CAD s/p PCI with stent in 2002,  Last cath in july 2014 with RCA disease medically managed, Hyperlipidemia,  PVD,  Iron deficiency anemia,  Chronic back pain,  Gout,  BPH,  CKD III with baseline Cr 2.2,  PAF not on a/c,  Hx of GI Bleed in the past, hemorrhoids s/p banding,  PSHx: ::  Right rotator cuff repair  pilonidal cyst  Family History: ::  Father: rectal CA  Mother: HTN, DM  3 siblings: DM  Social History: ::  Tobacco: smoked 1ppd X 50 years, quit 22 years ago.  Rare ETOH use.  wife recently passed away last year (21 Mar 2017 06:35)      PAST MEDICAL & SURGICAL HISTORY:  Sepsis, due to unspecified organism: 2/2 poorly healing wounds b/l  BPH (benign prostatic hypertrophy)  Hyperlipemia  Coronary artery disease  Hypertension  Dyspepsia: On moderate exertion.  Sleep apnea, obstructive: Requires home 02 therapy, and treatment with BIPAP  Atelectasis  Pleural effusion, bilateral  Respiratory failure  Peripheral edema  CRI (chronic renal insufficiency)  Gout  CRF (chronic renal failure)  Benign prostatic hypertrophy  Spinal stenosis  Hypercholesterolemia  GERD (gastroesophageal reflux disease)  CAD (coronary artery disease)  Hypertension  S/P angioplasty with stent  Cataract of left eye  Prostate: Surgery green light procedure.  S/P rotator cuff surgery: Right  S/P angioplasty  Rotator cuff tear, right: repair      MEDICATIONS  (STANDING):  finasteride 5milliGRAM(s) Oral daily  buDESOnide   0.5 milliGRAM(s) Respule 0.5milliGRAM(s) Nebulizer two times a day  doxazosin 8milliGRAM(s) Oral at bedtime  isosorbide   mononitrate ER Tablet (IMDUR) 120milliGRAM(s) Oral daily  gabapentin Oral Tab/Cap - Peds 600milliGRAM(s) Oral at bedtime  allopurinol 100milliGRAM(s) Oral daily  loratadine 10milliGRAM(s) Oral daily  fenofibrate Tablet 145milliGRAM(s) Oral daily  simvastatin 10milliGRAM(s) Oral at bedtime  pramipexole 0.125milliGRAM(s) Oral three times a day  metoprolol Oral Tab/Cap - Peds 12.5milliGRAM(s) Oral two times a day  hydrALAZINE 150milliGRAM(s) Oral two times a day  ammonium lactate 12% Lotion 1Application(s) Topical two times a day  diltiazem   CD 240milliGRAM(s) Oral daily  senna 2Tablet(s) Oral at bedtime  glycerin Suppository - Adult 1Suppository(s) Rectal daily  acetaminophen   Tablet 650milliGRAM(s) Oral every 6 hours  pantoprazole Infusion 8mG/Hr IV Continuous <Continuous>  sucralfate 1Gram(s) Oral four times a day  fluticasone propionate 50 MICROgram(s)/spray Nasal Spray 1Spray(s) Both Nostrils two times a day  furosemide   Injectable 40milliGRAM(s) IV Push daily    MEDICATIONS  (PRN):  nitroglycerin     SubLingual 0.4milliGRAM(s) SubLingual every 5 minutes PRN Chest Pain  ondansetron Injectable 4milliGRAM(s) IV Push every 6 hours PRN Nausea and/or Vomiting  diphenhydrAMINE   Elixir 12.5milliGRAM(s) Oral every 6 hours PRN Rash and/or Itching      Allergies    No Known Allergies    Intolerances        SOCIAL HISTORY:    FAMILY HISTORY:  No pertinent family history in first degree relatives      REVIEW OF SYSTEMS:    CONSTITUTIONAL: No weakness, fevers or chills  EYES/ENT: No visual changes;  No vertigo or throat pain   NECK: No pain or stiffness  RESPIRATORY: No cough, wheezing, hemoptysis; No shortness of breath  CARDIOVASCULAR: No chest pain or palpitations  GENITOURINARY: No dysuria, frequency or hematuria  NEUROLOGICAL: No numbness or weakness  SKIN: No itching, burning, rashes, or lesions   All other review of systems is negative unless indicated above.    Vital Signs Last 24 Hrs  T(C): 36.8, Max: 37 (03-28 @ 11:45)  T(F): 98.2, Max: 98.6 (03-28 @ 11:45)  HR: 84 (84 - 98)  BP: 133/35 (133/35 - 153/42)  BP(mean): --  RR: 17 (17 - 18)  SpO2: 100% (97% - 100%)    PHYSICAL EXAM:    Constitutional: NAD, well-developed  HEENT: EOMI, throat clear  Neck: No LAD, supple  Respiratory: CTA and P  Cardiovascular: S1 and S2, RRR, no M  Gastrointestinal: BS+, soft, NT/ND, neg HSM,  Extremities: No peripheral edema, neg clubing, cyanosis  Vascular: 2+ peripheral pulses  Neurological: A/O x 3, no focal deficits  Psychiatric: Normal mood, normal affect  Skin: No rashes    LABS:  CBC Full  -  ( 29 Mar 2017 05:51 )  WBC Count : 6.9 K/uL  Hemoglobin : 7.1 g/dL  Hematocrit : 22.0 %  Platelet Count - Automated : 264 K/uL  Mean Cell Volume : 94.3 fl  Mean Cell Hemoglobin : 30.5 pg  Mean Cell Hemoglobin Concentration : 32.3 gm/dL  Auto Neutrophil # : x  Auto Lymphocyte # : x  Auto Monocyte # : x  Auto Eosinophil # : x  Auto Basophil # : x  Auto Neutrophil % : x  Auto Lymphocyte % : x  Auto Monocyte % : x  Auto Eosinophil % : x  Auto Basophil % : x    28 Mar 2017 06:03    149    |  117    |  82     ----------------------------<  125    4.3     |  24     |  1.98     Ca    7.8        28 Mar 2017 06:03              RADIOLOGY & ADDITIONAL STUDIES:

## 2017-03-29 NOTE — PROGRESS NOTE ADULT - SUBJECTIVE AND OBJECTIVE BOX
NEPHROLOGY INTERVAL HPI/OVERNIGHT EVENTS:  for transfusion today.        MEDICATIONS  (STANDING):  finasteride 5milliGRAM(s) Oral daily  buDESOnide   0.5 milliGRAM(s) Respule 0.5milliGRAM(s) Nebulizer two times a day  doxazosin 8milliGRAM(s) Oral at bedtime  isosorbide   mononitrate ER Tablet (IMDUR) 120milliGRAM(s) Oral daily  gabapentin Oral Tab/Cap - Peds 600milliGRAM(s) Oral at bedtime  allopurinol 100milliGRAM(s) Oral daily  loratadine 10milliGRAM(s) Oral daily  fenofibrate Tablet 145milliGRAM(s) Oral daily  simvastatin 10milliGRAM(s) Oral at bedtime  pramipexole 0.125milliGRAM(s) Oral three times a day  metoprolol Oral Tab/Cap - Peds 12.5milliGRAM(s) Oral two times a day  hydrALAZINE 150milliGRAM(s) Oral two times a day  ammonium lactate 12% Lotion 1Application(s) Topical two times a day  diltiazem   CD 240milliGRAM(s) Oral daily  senna 2Tablet(s) Oral at bedtime  glycerin Suppository - Adult 1Suppository(s) Rectal daily  acetaminophen   Tablet 650milliGRAM(s) Oral every 6 hours  pantoprazole Infusion 8mG/Hr IV Continuous <Continuous>  sucralfate 1Gram(s) Oral four times a day  fluticasone propionate 50 MICROgram(s)/spray Nasal Spray 1Spray(s) Both Nostrils two times a day  furosemide   Injectable 40milliGRAM(s) IV Push daily    MEDICATIONS  (PRN):  nitroglycerin     SubLingual 0.4milliGRAM(s) SubLingual every 5 minutes PRN Chest Pain  ondansetron Injectable 4milliGRAM(s) IV Push every 6 hours PRN Nausea and/or Vomiting  diphenhydrAMINE   Elixir 12.5milliGRAM(s) Oral every 6 hours PRN Rash and/or Itching      Allergies    No Known Allergies    Intolerances        I&O's Detail    I & Os for current day (as of 29 Mar 2017 12:32)  =============================================  IN:    Packed Red Blood Cells: 325 ml    Total IN: 325 ml  ---------------------------------------------  OUT:    Total OUT: 0 ml  ---------------------------------------------  Total NET: 325 ml      Vital Signs Last 24 Hrs  T(C): 36.8, Max: 36.8 ( @ 21:43)  T(F): 98.2, Max: 98.2 ( @ 21:43)  HR: 84 (84 - 97)  BP: 133/35 (133/35 - 153/42)  BP(mean): --  RR: 17 (17 - 18)  SpO2: 100% (97% - 100%)  Daily     Daily Weight in k.4 (29 Mar 2017 09:39)    PHYSICAL EXAM:  General: alert. awake Ox3  HEENT: MMM  CV: s1s2 rrr  LUNGS: B/L CTA  EXT: LE dressing in place    LABS:                        7.1    6.9   )-----------( 264      ( 29 Mar 2017 05:51 )             22.0     28 Mar 2017 06:03    149    |  117    |  82     ----------------------------<  125    4.3     |  24     |  1.98     Ca    7.8        28 Mar 2017 06:03

## 2017-03-29 NOTE — PROGRESS NOTE ADULT - ASSESSMENT
- ARYA/CKD  stage 4 (scr 2.5) due to volume depletion, will hold lasix for now and fu response to transfusion.  fu AM labs send,   - Anemia:  s/p egd and colonscopy, with ulcers.  likely source of bleed.  fu the trend of hgb.  would dc with hgb close to 9 with transfusion as needed.  - Hypernatremia: po fluid intake    3/25   - CKD stage 4: stable.  hold lasix standing dose and only prn as needed  - Hypernatremia: po fluid intake  - GI bleed: transfusion with lasix iv x1    3/26   - CKD stage 4 with mild hypernatremia: hold iv lasix and encourage moderate po fluid intake.    - GI bleed: fu trend of the hgb.  on protonix drip.    3/27   - CKD stage 4, stable restart lasix upon dc.  of goes to rehab would restart at lasix 40 mg qd.  if goes to home, lasix 40 mg alternating with lasix 80 mg.    - GI bleed, advance diet and carafate   - Hypernatremia: moniter on advanced po fluid intake    3/28 SY  --CKD with stable parameters.  Will resume diuretics.  IV lasix until d/c to rehab--then change to po.  --LE edema.  bilateral dressings in place.  Resume diuretics  --Serum sodium acceptable.  Continue to monitor.  --GIB   HGB lower today.  post transfusion.    3/29   -CKD stage 4: stable, diuretics restarted  - GI bleed: for transfusion today   - LE Venous stasis ulcers:   whirlpool and xeroform, kerlix and ace bandage

## 2017-03-29 NOTE — PROGRESS NOTE ADULT - ASSESSMENT
82 y/o male with PMHx of COPD, CHF, chronic renal failure, paroxysmal A-fib.with last hospitaliztion 10/2017 for hematochezia s/p bleeding scan and flex sigmoidoscopy  was BIBA  now being worked up for GI bleed and has required multiple 4-5 units PRBCs done  now needs eval for upper and likley lower endoscopy  Chronic hypoxemic resp failure  CHF appears stable  P Afib now in nsr  hemodynamically stabe  SHAYY / OHS /COPD-overlap syndrome  suggest ABG and repeat cxr today to complete her workup  I've explained to pt and his DTR, critical care RN that pt is at incresaed risk of resp failure requiring mechanical ventilation with sedation, but his pulm status appears optimized presently for this indicated procedure with recent requirement to get muliple prbcs over last few days  recommend  ABG noted  cxr is clear  cont nocturnal BIPAP  Anesthesia veal before his procedure  cardiac eval in progress  will check outpt pfts, placed in chart  ABG noted  FEV1 6/2016 1.38, 68% predicted  DLCO 45% predicted  supportive care  BIPAP 12/8 with o2 2 liters attached  plan as per GI  cont current rx  supportive care required  Add flonase nasal spray   all recent data discussed with pt today 3/28  case discussed with Dr Hernández today  GI eval in progress for anemia  check repeat cxr today  may need additional endoscopy for anemia  hemodynamically stable

## 2017-03-29 NOTE — PROGRESS NOTE ADULT - SUBJECTIVE AND OBJECTIVE BOX
CHIEF COMPLAINT: GIB    SUBJECTIVE:   Patient seen and examined. He denies any new complaints. Requesting for regular diet.     REVIEW OF SYSTEMS:  CONSTITUTIONAL: No weakness, fevers or chills  RESPIRATORY: No cough, wheezing, hemoptysis; No shortness of breath  CARDIOVASCULAR: No chest pain or palpitations  All other review of systems is negative unless indicated above    Vital Signs Last 24 Hrs  T(C): 36.8, Max: 36.8 (03-28 @ 21:43)  T(F): 98.2, Max: 98.2 (03-28 @ 21:43)  HR: 84 (84 - 97)  BP: 133/35 (133/35 - 153/42)  BP(mean): --  RR: 17 (17 - 18)  SpO2: 100% (97% - 100%)      PHYSICAL EXAM:    Constitutional: NAD, awake and alert, well-developed  HEENT: PERR, EOMI, Normal Hearing, MMM  Neck: Soft and supple, No LAD, No JVD  Respiratory: Breath sounds are clear bilaterally, No wheezing, rales or rhonchi  Cardiovascular: S1 and S2, regular rate and rhythm, no Murmurs, gallops or rubs  Gastrointestinal: Bowel Sounds present, soft  Extremities: No peripheral edema  Neurological: A/O x 3, no focal deficits  Musculoskeletal: moves all extrem  Skin: BLE bandages with ulcerations improving per family      MEDICATIONS  (STANDING):  finasteride 5milliGRAM(s) Oral daily  buDESOnide   0.5 milliGRAM(s) Respule 0.5milliGRAM(s) Nebulizer two times a day  doxazosin 8milliGRAM(s) Oral at bedtime  isosorbide   mononitrate ER Tablet (IMDUR) 120milliGRAM(s) Oral daily  gabapentin Oral Tab/Cap - Peds 600milliGRAM(s) Oral at bedtime  allopurinol 100milliGRAM(s) Oral daily  loratadine 10milliGRAM(s) Oral daily  fenofibrate Tablet 145milliGRAM(s) Oral daily  simvastatin 10milliGRAM(s) Oral at bedtime  pramipexole 0.125milliGRAM(s) Oral three times a day  metoprolol Oral Tab/Cap - Peds 12.5milliGRAM(s) Oral two times a day  hydrALAZINE 150milliGRAM(s) Oral two times a day  ammonium lactate 12% Lotion 1Application(s) Topical two times a day  diltiazem   CD 240milliGRAM(s) Oral daily  senna 2Tablet(s) Oral at bedtime  glycerin Suppository - Adult 1Suppository(s) Rectal daily  acetaminophen   Tablet 650milliGRAM(s) Oral every 6 hours  pantoprazole Infusion 8mG/Hr IV Continuous <Continuous>  sucralfate 1Gram(s) Oral four times a day  fluticasone propionate 50 MICROgram(s)/spray Nasal Spray 1Spray(s) Both Nostrils two times a day  furosemide   Injectable 40milliGRAM(s) IV Push daily    MEDICATIONS  (PRN):  nitroglycerin     SubLingual 0.4milliGRAM(s) SubLingual every 5 minutes PRN Chest Pain  ondansetron Injectable 4milliGRAM(s) IV Push every 6 hours PRN Nausea and/or Vomiting  diphenhydrAMINE   Elixir 12.5milliGRAM(s) Oral every 6 hours PRN Rash and/or Itching      LABS: All Labs Reviewed:                                   7.1    6.9   )-----------( 264      ( 29 Mar 2017 05:51 )             22.0     28 Mar 2017 06:03    149    |  117    |  82     ----------------------------<  125    4.3     |  24     |  1.98     Ca    7.8        28 Mar 2017 06:03          CAPILLARY BLOOD GLUCOSE              80 y/o male with PMHx of COPD, CHF, chronic renal failure, paroxysmal A-fib.with last hospitaliztion 10/2017 for hematochezia s/p bleeding scan and flex sigmoidoscopy  was BIBA for acute worsening lower back pain radiating to buttock to foot.  In Ed patient noted to have Hb 6 and stool guaiac positive  and rectal exam- with cookie  done by ED physician as per note.    Pt was admitted to telemetry and anemia was evaluated with EGD/colonscopy that revealed  non-bleeding ulcers (one with eschar) and colonic mass. He required 8 units PRBCs to maintain Hb>7. He was also evaluated by vascular and wound care for BLE vascular ulcerations. His unna boots were stopped and dressings applied daily. He also had whirlpool baths of his BLE.     Anticipate discharge  when Hb can remain stable. Tentative plan is for rehab for PT and continued wound care.      Assessment and Plan:   		  80 y/o male with PMHx of COPD, CHF, chronic renal failure, paroxysmal A-fib.with last hospitalization 10/2017 for hematochezia s/p bleeding scan and flex sigmoidoscopy that is admitted for low hgb likely secondary to GIB from gastric ulcer.      Problem/Plan - 1: Acute Blood Loss Anemia due to UGIB associated with Gastric Ulcer  -Hb 6.3 on arrival, s/p 8 units PRBCs per nursing. Received one unit yesterday. Transfuse another unit today.  -EGD:4 non-bleeding cratered ulcers fundus/curvature of gastric body, the largest 15mm in dimension with eschar not amenable to intervention  -Colon: nonobstructing 3 cmx 7cm mass in transverse colon, no bleeding. limited prep  -Protonix gtt, to continue per GI, carafate added  -Diet advanced   -hold iron given confounding dark stool and iron load with PRBCs (iron low per labs)    Problem/Plan - 2: SHAYY with chronic hypoxic, hypercapnic respiratory failure superimposed on COPD  -Bipap while asleep, noncompliant  -Daytime O2 at 2LNC  -no longer smoking      Problem/Plan - 3: BLE venous stasis ulcers  -PTA, wound care with Dr Cardenas with xeroform, kerlix and ace bandages for dressings.    -Evaluation by vascular this admission  -wound care nurse evaluation: LLE with erythema and scattered ulcerations draining tan discharge with foul odor. RLE with erythema and scattered ulcerations draining tan discharge with foul odor. RLE with an ulceration to the lateral malleolus with tan tissue measuring [id number]. Dopplers used to auscultate dorsalis pedal pulses, L > R.   -wound care recs: At this time, patient would greatly benefit from a whirlpool treatment to both lower extremities to remove debris and assist with odor control. Patient remains in his [employer] 9000 MRS on his backside until he whirlpool arrives. 1) Daily Whirlpool treatments with 2 drops of hibiclens 2) After whirlpool apply Xeroform, kerlix and ace bandage. 3) Elevate extremities off mattress. 4) Turn and position every 2 hours  -investigate surgical shoes for feet (ordered via nursing). currently wearing cut crocs.    Problem/Plan - 4: ARYA on CKD IV   -cr 3.5 on arrival, improving  -lasix resumed  -renal also following    Problem/Plan - 5: Essential hypertension  -c/w current management BP stable over course of admission.     Problem/Plan - 6: Hypovolemic Hypernatremia, hyperchloremia  -Diet advanced, encourage free water  -defer electrolyte mgmt to renal    Problem/Plan - 7: Spinal stenosis  -Gabapentin  -PT eval for lower extremity weakness- rehab recommended      Problem/Plan - 8:Paroxysmal Atrial Fibrillation  -rate control with metoprolol, diltiazem  -no amiodarone due to underlying lung capacity  -CHADS2=2.  no AC given GIB    Problem/Plan - 9:Morbid Obesity  -BMI 37 by chart, but likely he needs accurate weight recorded  -high hip: waste ratio  -nutrition consult when diet advanced  -pt not open to changing diet as multiple complex carbs already at bedside in anticipation of advanced diet (cookies, rolls, crackers, donuts)    Problem/Plan - 10: Coronary artery disease  -Chronic diastolic CHF, CAD s/p PCI with stent in 2002, Last cath in july 2014 with RCA disease   -BBlockers, fibrates, statin  -no ASA due to bleeding

## 2017-03-29 NOTE — PROGRESS NOTE ADULT - SUBJECTIVE AND OBJECTIVE BOX
Patient is a 82y old  Male who presents with a chief complaint of left leg pain radiating from left buttoch and thigh (21 Mar 2017 06:35)      HPI:  HPI: :: 80 y/o male with PMHx of COPD, CHF, chronic renal failure, paroxysmal A-fib.with last hospitaliztion 10/2017 for hematochezia s/p bleeding scan and flex sigmoidoscopy  was BIBA for acute worsening lower back pain radiating to buttock to foot   patient with amber boots for chronic venous stasis ulcers and lower extremity chronic edema ,daughter reports increased oozing from ulcers in lai past few weeks,no fever  In Ed patient noted to have Hb 6 and stool guaiac positive  and rectal exam- with cookie  done by ED physician as per note   Patient denies any chest pain or worsening of SOB ,no abdominal pain or vomiting ,no diarrhea,no efver or chills  seen and evaluated along with Dr SANTIZO  His dtr is contacted as well    all his recent data and PFT data obtained and discussed with his cardiologist today 3/24  pt is scheduled for his GI workup today    3/27  pt is seen and examined and feels better  asks for food  no further transfusions required  no distress  tolerated procedure well  no co  no sob  3/28  asks about going home  some nasal congestion  no co  no difficulty bretahing  uses bipap at night  3/29  feels well this am  smiling  no dyspnea  nasal congestion improved with nasal sparys use  PMHx: ::  HTN,  COPD on home O2 2L,  SHAYY on nocturnal BIPAP,  Chronic diastolic CHF,  CAD s/p PCI with stent in 2002,  Last cath in july 2014 with RCA disease medically managed, Hyperlipidemia,  PVD,  Iron deficiency anemia,  Chronic back pain,  Gout,  BPH,  CKD III with baseline Cr 2.2,  PAF not on a/c,  Hx of GI Bleed in the past, hemorrhoids s/p banding,  PSHx: ::  Right rotator cuff repair  pilonidal cyst  Family History: ::  Father: rectal CA  Mother: HTN, DM  3 siblings: DM  Social History: ::  Tobacco: smoked 1ppd X 50 years, quit 22 years ago.  Rare ETOH use.  wife recently passed away last year (21 Mar 2017 06:35)      PAST MEDICAL & SURGICAL HISTORY:  Sepsis, due to unspecified organism: 2/2 poorly healing wounds b/l  BPH (benign prostatic hypertrophy)  Hyperlipemia  Coronary artery disease  Hypertension  Dyspepsia: On moderate exertion.  Sleep apnea, obstructive: Requires home 02 therapy, and treatment with BIPAP  Atelectasis  Pleural effusion, bilateral  Respiratory failure  Peripheral edema  CRI (chronic renal insufficiency)  Gout  CRF (chronic renal failure)  Benign prostatic hypertrophy  Spinal stenosis  Hypercholesterolemia  GERD (gastroesophageal reflux disease)  CAD (coronary artery disease)  Hypertension  S/P angioplasty with stent  Cataract of left eye  Prostate: Surgery green light procedure.  S/P rotator cuff surgery: Right  S/P angioplasty  Rotator cuff tear, right: repair      PREVIOUS DIAGNOSTIC TESTING:    MEDICATIONS  (STANDING):  finasteride 5milliGRAM(s) Oral daily  buDESOnide   0.5 milliGRAM(s) Respule 0.5milliGRAM(s) Nebulizer two times a day  doxazosin 8milliGRAM(s) Oral at bedtime  isosorbide   mononitrate ER Tablet (IMDUR) 120milliGRAM(s) Oral daily  gabapentin Oral Tab/Cap - Peds 600milliGRAM(s) Oral at bedtime  allopurinol 100milliGRAM(s) Oral daily  loratadine 10milliGRAM(s) Oral daily  fenofibrate Tablet 145milliGRAM(s) Oral daily  simvastatin 10milliGRAM(s) Oral at bedtime  pramipexole 0.125milliGRAM(s) Oral three times a day  metoprolol Oral Tab/Cap - Peds 12.5milliGRAM(s) Oral two times a day  hydrALAZINE 150milliGRAM(s) Oral two times a day  ammonium lactate 12% Lotion 1Application(s) Topical two times a day  diltiazem   CD 240milliGRAM(s) Oral daily  senna 2Tablet(s) Oral at bedtime  glycerin Suppository - Adult 1Suppository(s) Rectal daily  acetaminophen   Tablet 650milliGRAM(s) Oral every 6 hours  pantoprazole Infusion 8mG/Hr IV Continuous <Continuous>  sucralfate 1Gram(s) Oral four times a day    MEDICATIONS  (PRN):  nitroglycerin     SubLingual 0.4milliGRAM(s) SubLingual every 5 minutes PRN Chest Pain  ondansetron Injectable 4milliGRAM(s) IV Push every 6 hours PRN Nausea and/or Vomiting  diphenhydrAMINE   Elixir 12.5milliGRAM(s) Oral every 6 hours PRN Rash and/or Itching        FAMILY HISTORY:  No pertinent family history in first degree relatives          REVIEW OF SYSTEM:  Pertinent items are noted in HPI.  Constitutional negative for chills, fevers, sweats and weight loss  throat, and face:  negative for epistaxis, nasal congestion, sore throat and   tinnitus  Respiratory: negative for cough,pos  dyspnea on exertion,no pleuritic chest pain  and wheezing  Cardiovascular:  negative for chest pain, dyspnea and palpitations  Gastrointestinal: negative for abdominal pain, diarrhea, nausea and vomiting  Genitourinary: negative for dysuria, frequency and urinary incontinence  Skin:  negative for redness, rash, pruritus, swelling, dryness and   fissures  Hematologic/lymphatic: negative for bleeding and easy bruising  Musculoskeletal: negative for arthralgias, back pain and muscle weakness  Neurological: negative for dizziness, headaches, seizures and tremors  Behavioral/Psych:  negative for mood change, depression, suicidal attempts    Allergic/Immunologic: negative for anaphylaxis, angioedema and urticaria    Vital Signs Last 24 Hrs  T(C): 36.8, Max: 37.2 (03-28 @ 09:56)  T(F): 98.2, Max: 98.9 (03-28 @ 09:56)  HR: 94 (87 - 107)  BP: 153/42 (134/39 - 153/42)  BP(mean): --  RR: 18 (18 - 18)  SpO2: 98% (96% - 98%)  PHYSICAL EXAM  General Appearance: cooperative, no acute distress,   HEENT: PERRL, conjunctiva clear, EOM's intact, non injected pharynx, no exudate, TM   normal  Neck: Supple, , no adenopathy, thyroid: not enlarged, no carotid bruit or JVD  Back: Symmetric, no  tenderness,no soft tissue tenderness  Lungs: Clear to auscultation bilateral,no adventitious breath sounds, normal   expiratory phase  Heart: Regular rate and rhythm, S1, S2 normal, no murmur, rub or gallop  Abdomen: Soft, non-tender, bowel sounds active , no hepatosplenomegaly  Extremities: no cyanosis or edema, no joint swelling  Skin: Skin color, texture normal, no rashes   Neurologic: Alert and oriented X3 , cranial nerves intact, sensory and motor normal,    ECG:    LABS:                            7.1    6.9   )-----------( 264      ( 29 Mar 2017 05:51 )             22.0                        28 Mar 2017 06:03    149    |  117    |  82     ----------------------------<  125    4.3     |  24     |  1.98     Ca    7.8        28 Mar 2017 06:03                    7.7    8.8   )-----------( 239      ( 27 Mar 2017 05:49 )             24.0       27 Mar 2017 05:49    151    |  117    |  77     ----------------------------<  110    4.1     |  23     |  1.87     Ca    8.0        27 Mar 2017 05:49                 8.2    x     )-----------( x        ( 23 Mar 2017 07:54 )             25.4     23 Mar 2017 05:00    151    |  118    |  107    ----------------------------<  113    4.5     |  23     |  2.50     Ca    7.8        23 Mar 2017 05:00    TPro  5.7    /  Alb  2.4    /  TBili  0.5    /  DBili  x      /  AST  23     /  ALT  11     /  AlkPhos  35     21 Mar 2017 09:55    CARDIAC MARKERS ( 21 Mar 2017 17:23 )  0.028 ng/mL / x     / 196 U/L / x     / x      CARDIAC MARKERS ( 21 Mar 2017 09:55 )  0.023 ng/mL / x     / 172 U/L / x     / x            Pro BNP  1025 03-21 @ 09:55  D Dimer  -- 03-21 @ 09:55    PT/INR - ( 21 Mar 2017 09:55 )   PT: 11.9 sec;   INR: 1.08 ratio         PTT - ( 21 Mar 2017 09:55 )  PTT:35.5 sec  INTERPRETATION:  Exam Date: 3/21/2017 7:33 AM    History: Back pain    Technique: Single frontal portable view of the chest with comparison to    10/20/2016    Findings:    Studies limited by rotation.    The heart is enlarged. No focal consolidation. The apices and   hemidiaphragms are unremarkable. Degenerative changes of the visualized   osseous structures.        Impression:EXAM:  CHEST SINGLE VIEW FRONTAL                            PROCEDURE DATE:  03/23/2017        INTERPRETATION:  Views:1  Comparison: 2 days ago  History: CHF    The lungs are clear of infiltrates and effusions.     The cardiac and   mediastinal contours appear unremarkable.     Impression:  1. No evidence of acute pulmonary disease.      Cardiomegaly          ABG - ( 23 Mar 2017 11:07 )  pH: 7.40  /  pCO2: 35    /  pO2: 109   / HCO3: 21    / Base Excess: -3    /  SaO2: 99            28 Mar 2017 06:03    149    |  117    |  82     ----------------------------<  125    4.3     |  24     |  1.98     Ca    7.8        28 Mar 2017 06:03        RADIOLOGY & ADDITIONAL STUDIES:

## 2017-03-29 NOTE — PROGRESS NOTE ADULT - ASSESSMENT
anemia  likely multifactorial with GIB and redal dx, Acdx    multiple ulcers in sotm, diff including CA dw pt and family   reviewed withthem again and is considering repeat EGD if cont bleed    no bx due to inc risk of bleeding and likely not surgical cadidate, Daughter and pt are now reconsidering Bx if repeat EGD done      cont PPI gtt  serial HCT,   cont carafate  advanced diet and vida  follow HCT and keep HGB over 8  risk holding ASA DW pt          serial Hct, keep hgb over 8      copd, home o2  CAD, on ASA    ASA stopped      constipation, recent, self disimpaction  laxatives  cont tx      LBP avoid NSAIDs    COPD and SHAYY  cont tx

## 2017-03-30 ENCOUNTER — RESULT REVIEW (OUTPATIENT)
Age: 82
End: 2017-03-30

## 2017-03-30 LAB
ALBUMIN SERPL ELPH-MCNC: 1.8 G/DL — LOW (ref 3.3–5)
ANION GAP SERPL CALC-SCNC: 11 MMOL/L — SIGNIFICANT CHANGE UP (ref 5–17)
BUN SERPL-MCNC: 97 MG/DL — HIGH (ref 7–23)
CALCIUM SERPL-MCNC: 7.9 MG/DL — LOW (ref 8.5–10.1)
CHLORIDE SERPL-SCNC: 116 MMOL/L — HIGH (ref 96–108)
CO2 SERPL-SCNC: 22 MMOL/L — SIGNIFICANT CHANGE UP (ref 22–31)
CREAT SERPL-MCNC: 2.27 MG/DL — HIGH (ref 0.5–1.3)
GLUCOSE SERPL-MCNC: 119 MG/DL — HIGH (ref 70–99)
HCT VFR BLD CALC: 21.9 % — LOW (ref 39–50)
HCT VFR BLD CALC: 21.9 % — LOW (ref 39–50)
HGB BLD-MCNC: 7 G/DL — CRITICAL LOW (ref 13–17)
HGB BLD-MCNC: 7.3 G/DL — LOW (ref 13–17)
MCHC RBC-ENTMCNC: 29.6 PG — SIGNIFICANT CHANGE UP (ref 27–34)
MCHC RBC-ENTMCNC: 30.1 PG — SIGNIFICANT CHANGE UP (ref 27–34)
MCHC RBC-ENTMCNC: 31.9 GM/DL — LOW (ref 32–36)
MCHC RBC-ENTMCNC: 33.2 GM/DL — SIGNIFICANT CHANGE UP (ref 32–36)
MCV RBC AUTO: 90.6 FL — SIGNIFICANT CHANGE UP (ref 80–100)
MCV RBC AUTO: 92.9 FL — SIGNIFICANT CHANGE UP (ref 80–100)
PHOSPHATE SERPL-MCNC: 2.6 MG/DL — SIGNIFICANT CHANGE UP (ref 2.5–4.5)
PLATELET # BLD AUTO: 271 K/UL — SIGNIFICANT CHANGE UP (ref 150–400)
PLATELET # BLD AUTO: 272 K/UL — SIGNIFICANT CHANGE UP (ref 150–400)
POTASSIUM SERPL-MCNC: 4.6 MMOL/L — SIGNIFICANT CHANGE UP (ref 3.5–5.3)
POTASSIUM SERPL-SCNC: 4.6 MMOL/L — SIGNIFICANT CHANGE UP (ref 3.5–5.3)
RBC # BLD: 2.36 M/UL — LOW (ref 4.2–5.8)
RBC # BLD: 2.42 M/UL — LOW (ref 4.2–5.8)
RBC # FLD: 16.3 % — HIGH (ref 10.3–14.5)
RBC # FLD: 16.7 % — HIGH (ref 10.3–14.5)
SODIUM SERPL-SCNC: 149 MMOL/L — HIGH (ref 135–145)
WBC # BLD: 7.5 K/UL — SIGNIFICANT CHANGE UP (ref 3.8–10.5)
WBC # BLD: 8.4 K/UL — SIGNIFICANT CHANGE UP (ref 3.8–10.5)
WBC # FLD AUTO: 7.5 K/UL — SIGNIFICANT CHANGE UP (ref 3.8–10.5)
WBC # FLD AUTO: 8.4 K/UL — SIGNIFICANT CHANGE UP (ref 3.8–10.5)

## 2017-03-30 PROCEDURE — 78278 ACUTE GI BLOOD LOSS IMAGING: CPT | Mod: 26

## 2017-03-30 RX ORDER — FUROSEMIDE 40 MG
20 TABLET ORAL ONCE
Qty: 0 | Refills: 0 | Status: COMPLETED | OUTPATIENT
Start: 2017-03-30 | End: 2017-03-30

## 2017-03-30 RX ORDER — IPRATROPIUM/ALBUTEROL SULFATE 18-103MCG
3 AEROSOL WITH ADAPTER (GRAM) INHALATION ONCE
Qty: 0 | Refills: 0 | Status: COMPLETED | OUTPATIENT
Start: 2017-03-30 | End: 2017-03-30

## 2017-03-30 RX ORDER — ACETAMINOPHEN 500 MG
650 TABLET ORAL EVERY 6 HOURS
Qty: 0 | Refills: 0 | Status: DISCONTINUED | OUTPATIENT
Start: 2017-03-30 | End: 2017-04-05

## 2017-03-30 RX ORDER — OXYCODONE HYDROCHLORIDE 5 MG/1
5 TABLET ORAL
Qty: 0 | Refills: 0 | Status: DISCONTINUED | OUTPATIENT
Start: 2017-03-30 | End: 2017-04-05

## 2017-03-30 RX ADMIN — Medication 150 MILLIGRAM(S): at 21:49

## 2017-03-30 RX ADMIN — PRAMIPEXOLE DIHYDROCHLORIDE 0.12 MILLIGRAM(S): 0.12 TABLET ORAL at 14:09

## 2017-03-30 RX ADMIN — PRAMIPEXOLE DIHYDROCHLORIDE 0.12 MILLIGRAM(S): 0.12 TABLET ORAL at 21:47

## 2017-03-30 RX ADMIN — Medication 8 MILLIGRAM(S): at 23:57

## 2017-03-30 RX ADMIN — Medication 145 MILLIGRAM(S): at 11:12

## 2017-03-30 RX ADMIN — PANTOPRAZOLE SODIUM 10 MG/HR: 20 TABLET, DELAYED RELEASE ORAL at 01:25

## 2017-03-30 RX ADMIN — Medication 12.5 MILLIGRAM(S): at 05:31

## 2017-03-30 RX ADMIN — PRAMIPEXOLE DIHYDROCHLORIDE 0.12 MILLIGRAM(S): 0.12 TABLET ORAL at 05:30

## 2017-03-30 RX ADMIN — Medication 0.5 MILLIGRAM(S): at 07:53

## 2017-03-30 RX ADMIN — Medication 40 MILLIGRAM(S): at 05:32

## 2017-03-30 RX ADMIN — Medication 100 MILLIGRAM(S): at 11:07

## 2017-03-30 RX ADMIN — SENNA PLUS 2 TABLET(S): 8.6 TABLET ORAL at 23:57

## 2017-03-30 RX ADMIN — Medication 150 MILLIGRAM(S): at 05:31

## 2017-03-30 RX ADMIN — GABAPENTIN 600 MILLIGRAM(S): 400 CAPSULE ORAL at 21:49

## 2017-03-30 RX ADMIN — Medication 650 MILLIGRAM(S): at 11:06

## 2017-03-30 RX ADMIN — Medication 1 APPLICATION(S): at 05:32

## 2017-03-30 RX ADMIN — Medication 1 GRAM(S): at 05:31

## 2017-03-30 RX ADMIN — ISOSORBIDE MONONITRATE 120 MILLIGRAM(S): 60 TABLET, EXTENDED RELEASE ORAL at 11:13

## 2017-03-30 RX ADMIN — Medication 20 MILLIGRAM(S): at 16:32

## 2017-03-30 RX ADMIN — Medication 650 MILLIGRAM(S): at 15:48

## 2017-03-30 RX ADMIN — Medication 12.5 MILLIGRAM(S): at 21:47

## 2017-03-30 RX ADMIN — Medication 1 SUPPOSITORY(S): at 11:13

## 2017-03-30 RX ADMIN — LORATADINE 10 MILLIGRAM(S): 10 TABLET ORAL at 11:12

## 2017-03-30 RX ADMIN — Medication 240 MILLIGRAM(S): at 05:31

## 2017-03-30 RX ADMIN — FINASTERIDE 5 MILLIGRAM(S): 5 TABLET, FILM COATED ORAL at 11:12

## 2017-03-30 RX ADMIN — Medication 1 SPRAY(S): at 21:49

## 2017-03-30 RX ADMIN — Medication 1 SPRAY(S): at 05:30

## 2017-03-30 RX ADMIN — Medication 3 MILLILITER(S): at 13:48

## 2017-03-30 RX ADMIN — SIMVASTATIN 10 MILLIGRAM(S): 20 TABLET, FILM COATED ORAL at 21:49

## 2017-03-30 NOTE — CHART NOTE - NSCHARTNOTEFT_GEN_A_CORE
Received Call from hospitalist melvin regarding +bleeding scan    VSS Received Call from hospitalist Jose regarding +bleeding scan; Last H/H drawn at 5AM 7.0/21.9.  Dr. Mora (GI) aware; Patient having no symptoms at this time. soft BM earlier this AM dark, tarry, but none since; no hematemesis. Family at bedside aware and agrees with plan    VSS    GEN; NAD  ABD: soft NT    Upper GI Bleed  -- Bleeding scan +for active stomach bleed, patient clinically stable  -- stat H/H 7.3/21.9 9:30 PM  -- transfuse 1 unit PRBC and f/u H/H  -- goal > Hgb 8  -- continue protonix, off carafate, off AC  -- NPO except meds after MN  -- plan for endoscopy in AM  -- monitor vitals closely  -- GI & Hospitalist aware

## 2017-03-30 NOTE — PROGRESS NOTE ADULT - ASSESSMENT
anemia  likely multifactorial with GIB and redal dx, Acdx    multiple ulcers in sotm, diff including CA dw pt and family   reviewed withthem again and iplan EGD   please transfusewith serial HGB  consider 2 UPRBC with lasix and keep HGB over 8 after that     hold carafate  clears and then NOPO p mn    Dw hospitalit, Daughter and anesthesia      agreeble to bx from periphery    please obtain surgical conult      cont PPI gtt  serial HCT,     follow HCT and keep HGB over 8  risk holding ASA DW pt          serial Hct, keep hgb over 8    copd, home o2  CAD, on ASA that ha been topped      constipation, recent, self disimpaction  laxatives  cont tx      LBP avoid NSAIDs    COPD and SHAYY  cont tx

## 2017-03-30 NOTE — PROGRESS NOTE ADULT - SUBJECTIVE AND OBJECTIVE BOX
Cardiology Progress Note:    HPI:  81 y/o male with PMHx of COPD, CHF, chronic renal failure, paroxysmal A-fib.with last hospitaliztion 10/2017 for hematochezia s/p bleeding scan and flex sigmoidoscopy  was BIBA for acute worsening lower back pain radiating to buttock to foot. Patient with amber boots for chronic venous stasis ulcers and lower extremity chronic edema ,daughter reports increased oozing from ulcers in the past few weeks, no fever.     3/25- No CP/SOB. No fevers. SR on tele.    3/26- Pt transfused another unit pRBCs yesterday- hgb 7.4 now. No CP/SOB. SR on tele.     3/27- No events last pm. No new complaints. No CP/SOB.     3/28- No CP/SOB. Hgb today 6.8. No new complaints. SR on tele.   3/30- Pt seen and examined by me today. he denies any symptoms today. Denies any SOB.    Family History: Father: rectal CA  Mother: HTN, DM  3 siblings: DM    Social History: ::  Tobacco: smoked 1ppd X 50 years, quit 22 years ago.  Rare ETOH use.  wife recently passed away last year (21 Mar 2017 06:35)    PAST MEDICAL & SURGICAL HISTORY:  Sepsis, due to unspecified organism: 2/2 poorly healing wounds b/l  BPH (benign prostatic hypertrophy)  Hyperlipemia  Coronary artery disease  Hypertension  Dyspepsia: On moderate exertion.  Sleep apnea, obstructive: Requires home 02 therapy, and treatment with BIPAP  Atelectasis  Pleural effusion, bilateral  Respiratory failure  Peripheral edema  CRI (chronic renal insufficiency)  Gout  CRF (chronic renal failure)  Benign prostatic hypertrophy  Spinal stenosis  Hypercholesterolemia  GERD (gastroesophageal reflux disease)  CAD (coronary artery disease)  Hypertension  S/P angioplasty with stent  Cataract of left eye  Prostate: Surgery green light procedure.  S/P rotator cuff surgery: Right  S/P angioplasty  Rotator cuff tear, right: repair    MEDICATIONS  (STANDING):  pantoprazole Infusion 8mG/Hr IV Continuous <Continuous>  finasteride 5milliGRAM(s) Oral daily  buDESOnide   0.5 milliGRAM(s) Respule 0.5milliGRAM(s) Nebulizer two times a day  aspirin enteric coated 81milliGRAM(s) Oral daily  doxazosin 8milliGRAM(s) Oral at bedtime  isosorbide   mononitrate ER Tablet (IMDUR) 120milliGRAM(s) Oral daily  diltiazem    Tablet 120milliGRAM(s) Oral two times a day  gabapentin Oral Tab/Cap - Peds 600milliGRAM(s) Oral at bedtime  gabapentin 300milliGRAM(s) Oral daily  allopurinol 100milliGRAM(s) Oral daily  loratadine 10milliGRAM(s) Oral daily  fenofibrate Tablet 145milliGRAM(s) Oral daily  simvastatin 10milliGRAM(s) Oral at bedtime  pramipexole 0.125milliGRAM(s) Oral three times a day  metoprolol Oral Tab/Cap - Peds 12.5milliGRAM(s) Oral two times a day  famotidine    Tablet 20milliGRAM(s) Oral at bedtime  ferrous    sulfate 325milliGRAM(s) Oral three times a day with meals  hydrALAZINE 150milliGRAM(s) Oral two times a day  pantoprazole    Tablet 40milliGRAM(s) Oral two times a day before meals  ammonium lactate 12% Lotion 1Application(s) Topical two times a day  heparin  Injectable 5000Unit(s) SubCutaneous every 8 hours    MEDICATIONS  (PRN):  nitroglycerin     SubLingual 0.4milliGRAM(s) SubLingual every 5 minutes PRN Chest Pain    Allergies  No Known Allergies    Intolerances    Vital Signs Last 24 Hrs  T(C): 36.6, Max: 36.7 (03-20 @ 22:39)  T(F): 97.9, Max: 98.1 (03-20 @ 22:39)  HR: 95 (90 - 112)  BP: 121/53 (121/53 - 140/63)  BP(mean): --  RR: 18 (16 - 18)  SpO2: 93% (93% - 100%)    REVIEW OF SYSTEMS:    CONSTITUTIONAL:  As per HPI.  HEENT:  Eyes:  No diplopia or blurred vision. ENT:  No earache, sore throat or runny nose.  CARDIOVASCULAR:  No pressure, squeezing, strangling, tightness, heaviness or aching about the chest, neck, axilla or epigastrium.  RESPIRATORY:  No cough, shortness of breath, PND or orthopnea.  GASTROINTESTINAL:  No nausea, vomiting or diarrhea.  GENITOURINARY:  No dysuria, frequency or urgency.  MUSCULOSKELETAL:  As per HPI.  SKIN:  No change in skin, hair or nails.  NEUROLOGIC:  No paresthesias, fasciculations, seizures or weakness.  PSYCHIATRIC:  No disorder of thought or mood.  ENDOCRINE:  No heat or cold intolerance, polyuria or polydipsia.  HEMATOLOGICAL:  No easy bruising or bleedings:  .     PHYSICAL EXAMINATION:    GENERAL APPEARANCE:  Pt. is not currently dyspneic, in no distress. Pt. is alert, oriented, and pleasant.  HEENT:  Pupils are normal and react normally. No icterus. Mucous membranes well colored.  NECK:  Supple. No lymphadenopathy. Jugular venous pressure not elevated. Carotids equal.   HEART:   The cardiac impulse has a normal quality. There are no murmurs, rubs or gallops noted  CHEST:  Chest is clear to auscultation. Normal respiratory effort.  ABDOMEN:  Soft and nontender.   EXTREMITIES:  There is no edema.   SKIN:  No rash or significant lesions are noted.    I&O's Summary    LABS:                        6.3    8.6   )-----------( 337      ( 20 Mar 2017 22:39 )             20.0   20 Mar 2017 22:39    147    |  114    |  114    ----------------------------<  124    4.4     |  23     |  3.51     Ca    8.1        20 Mar 2017 22:39    TPro  5.7    /  Alb  2.5    /  TBili  0.2    /  DBili  x      /  AST  19     /  ALT  14     /  AlkPhos  34     20 Mar 2017 22:39  LIVER FUNCTIONS - ( 20 Mar 2017 22:39 )  Alb: 2.5 g/dL / Pro: 5.7 gm/dL / ALK PHOS: 34 U/L / ALT: 14 U/L / AST: 19 U/L / GGT: x           PT/INR - ( 20 Mar 2017 22:39 )   PT: 11.3 sec;   INR: 1.03 ratio       PTT - ( 20 Mar 2017 22:39 )  PTT:33.6 secCARDIAC MARKERS ( 21 Mar 2017 03:42 )  0.018 ng/mL / x     / 107 U/L / x     / x        TELEMETRY: Normal sinus rhythm with no tachy or nelly events    RADIOLOGY- CXR- 3/23- [facility].    ASSESSMENT/PLAN-

## 2017-03-30 NOTE — PROGRESS NOTE ADULT - SUBJECTIVE AND OBJECTIVE BOX
NEPHROLOGY INTERVAL HPI/OVERNIGHT EVENTS:    HPI:  HPI: :: 80 y/o male with PMHx of COPD, CHF, chronic renal failure, paroxysmal A-fib.with last hospitalization 10/2017 for hematochezia s/p bleeding scan and flex sigmoidoscopy  was BIBA for acute worsening lower back pain radiating to buttock to foot   patient with amber boots for chronic venous stasis ulcers and lower extremity chronic edema ,daughter reports increased oozing from ulcers in the past few weeks, no fever  In Ed patient noted to have Hb 6 and stool guaiac positive  and rectal exam- with cookie  done by ED physician as per note   Patient denies any chest pain or worsening of SOB ,no abdominal pain or vomiting ,no diarrhea,no fever or chills    3/30  no new complaints  constipation being treated   diuretics resumed w elevation in the creat, and lasix was dc d    PMHx: ::  HTN,  COPD on home O2 2L,  SHAYY on nocturnal BIPAP,  Chronic diastolic CHF,  CAD s/p PCI with stent in 2002,  Last cath in july 2014 with RCA disease medically managed, Hyperlipidemia,  PVD,  Iron deficiency anemia,  Chronic back pain,  Gout,  BPH,  CKD III with baseline Cr 2.2,  PAF not on a/c,  Hx of GI Bleed in the past, hemorrhoids s/p banding,    PSHx: ::  Right rotator cuff repair  pilonidal cyst  Family History: ::  Father: rectal CA  Mother: HTN, DM  3 siblings: DM  Social History: ::  Tobacco: smoked 1ppd X 50 years, quit 22 years ago.  Rare ETOH use.  wife recently passed away last year (21 Mar 2017 06:35)      PAST MEDICAL & SURGICAL HISTORY:  Sepsis, due to unspecified organism: 2/2 poorly healing wounds b/l  BPH (benign prostatic hypertrophy)  Hyperlipemia  Coronary artery disease  Hypertension  Dyspepsia: On moderate exertion.  Sleep apnea, obstructive: Requires home 02 therapy, and treatment with BIPAP  Atelectasis  Pleural effusion, bilateral  Respiratory failure  Peripheral edema  CRI (chronic renal insufficiency)  Gout  CRF (chronic renal failure)  Benign prostatic hypertrophy  Spinal stenosis  Hypercholesterolemia  GERD (gastroesophageal reflux disease)  CAD (coronary artery disease)  Hypertension  S/P angioplasty with stent  Cataract of left eye  Prostate: Surgery green light procedure.  S/P rotator cuff surgery: Right  S/P angioplasty  Rotator cuff tear, right: repair      FAMILY HISTORY:  No pertinent family history in first degree relatives      MEDICATIONS  (STANDING):  finasteride 5milliGRAM(s) Oral daily  buDESOnide   0.5 milliGRAM(s) Respule 0.5milliGRAM(s) Nebulizer two times a day  doxazosin 8milliGRAM(s) Oral at bedtime  isosorbide   mononitrate ER Tablet (IMDUR) 120milliGRAM(s) Oral daily  gabapentin Oral Tab/Cap - Peds 600milliGRAM(s) Oral at bedtime  allopurinol 100milliGRAM(s) Oral daily  loratadine 10milliGRAM(s) Oral daily  fenofibrate Tablet 145milliGRAM(s) Oral daily  simvastatin 10milliGRAM(s) Oral at bedtime  pramipexole 0.125milliGRAM(s) Oral three times a day  metoprolol Oral Tab/Cap - Peds 12.5milliGRAM(s) Oral two times a day  hydrALAZINE 150milliGRAM(s) Oral two times a day  ammonium lactate 12% Lotion 1Application(s) Topical two times a day  diltiazem   CD 240milliGRAM(s) Oral daily  senna 2Tablet(s) Oral at bedtime  glycerin Suppository - Adult 1Suppository(s) Rectal daily  acetaminophen   Tablet 650milliGRAM(s) Oral every 6 hours  pantoprazole Infusion 8mG/Hr IV Continuous <Continuous>  fluticasone propionate 50 MICROgram(s)/spray Nasal Spray 1Spray(s) Both Nostrils two times a day  ALBUTerol/ipratropium for Nebulization. 3milliLiter(s) Nebulizer once    MEDICATIONS  (PRN):  nitroglycerin     SubLingual 0.4milliGRAM(s) SubLingual every 5 minutes PRN Chest Pain  ondansetron Injectable 4milliGRAM(s) IV Push every 6 hours PRN Nausea and/or Vomiting  diphenhydrAMINE   Elixir 12.5milliGRAM(s) Oral every 6 hours PRN Rash and/or Itching      Allergies    No Known Allergies    Intolerances        I&O's Summary        REVIEW OF SYSTEMS:    CONSTITUTIONAL:  As per HPI.    HEENT:  Eyes:  No diplopia or blurred vision. ENT:  No earache, sore throat or runny nose.    CARDIOVASCULAR:  No pressure, squeezing, strangling, tightness, heaviness or aching about the chest, neck, axilla or epigastrium.    RESPIRATORY:  No cough, shortness of breath, PND or orthopnea.    GASTROINTESTINAL:  No nausea, vomiting or diarrhea.    GENITOURINARY:  No dysuria, frequency or urgency.    MUSCULOSKELETAL:  As per HPI.    SKIN:  No change in skin, hair or nails.    NEUROLOGIC:  No paresthesias, fasciculations, seizures or weakness.    PSYCHIATRIC:  No disorder of thought or mood.    ENDOCRINE:  No heat or cold intolerance, polyuria or polydipsia.    HEMATOLOGICAL:  No easy bruising or bleeding.        Vital Signs Last 24 Hrs  T(C): 36.6, Max: 37.7 (03-29 @ 21:41)  T(F): 97.8, Max: 99.8 (03-29 @ 21:41)  HR: 74 (74 - 96)  BP: 148/36 (136/- - 159/31)  BP(mean): --  RR: 18 (15 - 18)  SpO2: 98% (95% - 100%)  Daily     Daily     PHYSICAL EXAM:    General:  Alert, well-developed ,No acute distress.    Neuro:  Alert and oriented to person, place, and time. Able to communicate  well. Cranial nerves 2-12 grossly intact. 5/5 strength in all  extremities bilaterally. Sensation intact in all extremities.  Appropriate affect.     HEENT:  No JVD, no masses, Eyes anicteric, No carotid bruits.No lymphadenopathy,    Cardiovascular:  Regular rate and rhythm, with normal S1 and S2. No murmurs, rubs,  or gallops. No JVD.    Lungs:  Lungs clear. no rales, no wheezing, .    Abdomen:  Normoactive bowel sounds. Soft, flat, non-tender, and non-distended.  No hepatosplenomegaly, positive bowel sounds    Skin:  Warm, dry, well-perfused. No rashes or other lesions.     Extremities:  2+ pulses in upper and lower extremities. No lower extremity pain or  edema; legs are symmetric in appearance.    LABS:                        7.0    7.5   )-----------( 271      ( 30 Mar 2017 05:24 )             21.9     30 Mar 2017 05:24    149    |  116    |  97     ----------------------------<  119    4.6     |  22     |  2.27     Ca    7.9        30 Mar 2017 05:24  Phos  2.6       30 Mar 2017 05:24    TPro  x      /  Alb  1.8    /  TBili  x      /  DBili  x      /  AST  x      /  ALT  x      /  AlkPhos  x      30 Mar 2017 05:24        Phosphorus Level, Serum: 2.6 mg/dL (03-30 @ 05:24)

## 2017-03-30 NOTE — PROGRESS NOTE ADULT - SUBJECTIVE AND OBJECTIVE BOX
Patient is a 82y old  Male who presents with a chief complaint of left leg pain radiating from left buttoch and thigh (21 Mar 2017 06:35)      HPI:  HPI: :: 82 y/o male with PMHx of COPD, CHF, chronic renal failure, paroxysmal A-fib.with last hospitaliztion 10/2017 for hematochezia s/p bleeding scan and flex sigmoidoscopy  was BIBA for acute worsening lower back pain radiating to buttock to foot   patient with [facility] for chronic venous stasis ulcers and lower extremity chronic edema ,daughter reports increased oozing from ulcers in lai past few weeks,no fever  In Ed patient noted to have Hb 6 and stool guaiac positive  and rectal exam- with cookie  done by ED physician as per note   Patient denies any chest pain or worsening of SOB ,no abdominal pain or vomiting ,no diarrhea,no efver or chills  seen and evaluated along with Dr SANTIZO  His dtr is contacted as well    all his recent data and PFT data obtained and discussed with his cardiologist today 3/24  pt is scheduled for his GI workup today    3/27  pt is seen and examined and feels better  asks for food  no further transfusions required  no distress  tolerated procedure well  no co  no sob  3/28  asks about going home  some nasal congestion  no co  no difficulty bretahing  uses bipap at night  3/29  feels well this am  smiling  no dyspnea  nasal congestion improved with nasal sparys use  3/30  no acute distress  getting cleaned this am while in bed  bilateral leg ulcers noted  PMHx: ::  HTN,  COPD on home O2 2L,  SHAYY on nocturnal BIPAP,  Chronic diastolic CHF,  CAD s/p PCI with stent in 2002,  Last cath in july 2014 with RCA disease medically managed, Hyperlipidemia,  PVD,  Iron deficiency anemia,  Chronic back pain,  Gout,  BPH,  CKD III with baseline Cr 2.2,  PAF not on a/c,  Hx of GI Bleed in the past, hemorrhoids s/p banding,  PSHx: ::  Right rotator cuff repair  pilonidal cyst  Family History: ::  Father: rectal CA  Mother: HTN, DM  3 siblings: DM  Social History: ::  Tobacco: smoked 1ppd X 50 years, quit 22 years ago.  Rare ETOH use.  wife recently passed away last year (21 Mar 2017 06:35)      PAST MEDICAL & SURGICAL HISTORY:  Sepsis, due to unspecified organism: 2/2 poorly healing wounds b/l  BPH (benign prostatic hypertrophy)  Hyperlipemia  Coronary artery disease  Hypertension  Dyspepsia: On moderate exertion.  Sleep apnea, obstructive: Requires home 02 therapy, and treatment with BIPAP  Atelectasis  Pleural effusion, bilateral  Respiratory failure  Peripheral edema  CRI (chronic renal insufficiency)  Gout  CRF (chronic renal failure)  Benign prostatic hypertrophy  Spinal stenosis  Hypercholesterolemia  GERD (gastroesophageal reflux disease)  CAD (coronary artery disease)  Hypertension  S/P angioplasty with stent  Cataract of left eye  Prostate: Surgery green light procedure.  S/P rotator cuff surgery: Right  S/P angioplasty  Rotator cuff tear, right: repair      PREVIOUS DIAGNOSTIC TESTING:    MEDICATIONS  (STANDING):  finasteride 5milliGRAM(s) Oral daily  buDESOnide   0.5 milliGRAM(s) Respule 0.5milliGRAM(s) Nebulizer two times a day  doxazosin 8milliGRAM(s) Oral at bedtime  isosorbide   mononitrate ER Tablet (IMDUR) 120milliGRAM(s) Oral daily  gabapentin Oral Tab/Cap - Peds 600milliGRAM(s) Oral at bedtime  allopurinol 100milliGRAM(s) Oral daily  loratadine 10milliGRAM(s) Oral daily  fenofibrate Tablet 145milliGRAM(s) Oral daily  simvastatin 10milliGRAM(s) Oral at bedtime  pramipexole 0.125milliGRAM(s) Oral three times a day  metoprolol Oral Tab/Cap - Peds 12.5milliGRAM(s) Oral two times a day  hydrALAZINE 150milliGRAM(s) Oral two times a day  ammonium lactate 12% Lotion 1Application(s) Topical two times a day  diltiazem   CD 240milliGRAM(s) Oral daily  senna 2Tablet(s) Oral at bedtime  glycerin Suppository - Adult 1Suppository(s) Rectal daily  acetaminophen   Tablet 650milliGRAM(s) Oral every 6 hours  pantoprazole Infusion 8mG/Hr IV Continuous <Continuous>  sucralfate 1Gram(s) Oral four times a day    MEDICATIONS  (PRN):  nitroglycerin     SubLingual 0.4milliGRAM(s) SubLingual every 5 minutes PRN Chest Pain  ondansetron Injectable 4milliGRAM(s) IV Push every 6 hours PRN Nausea and/or Vomiting  diphenhydrAMINE   Elixir 12.5milliGRAM(s) Oral every 6 hours PRN Rash and/or Itching        FAMILY HISTORY:  No pertinent family history in first degree relatives          REVIEW OF SYSTEM:  Pertinent items are noted in HPI.  Constitutional negative for chills, fevers, sweats and weight loss  throat, and face:  negative for epistaxis, nasal congestion, sore throat and   tinnitus  Respiratory: negative for cough,pos  dyspnea on exertion,no pleuritic chest pain  and wheezing  Cardiovascular:  negative for chest pain, dyspnea and palpitations  Gastrointestinal: negative for abdominal pain, diarrhea, nausea and vomiting  Genitourinary: negative for dysuria, frequency and urinary incontinence  Skin:  negative for redness, rash, pruritus, swelling, dryness and   fissures  Hematologic/lymphatic: negative for bleeding and easy bruising  Musculoskeletal: negative for arthralgias, back pain and muscle weakness  Neurological: negative for dizziness, headaches, seizures and tremors  Behavioral/Psych:  negative for mood change, depression, suicidal attempts    Allergic/Immunologic: negative for anaphylaxis, angioedema and urticaria  Vital Signs Last 24 Hrs  T(C): 37.7, Max: 37.7 (03-29 @ 21:41)  T(F): 99.8, Max: 99.8 (03-29 @ 21:41)  HR: 86 (84 - 96)  BP: 159/31 (133/35 - 159/31)  BP(mean): --  RR: 18 (15 - 18)  SpO2: 95% (95% - 100%)  PHYSICAL EXAM  General Appearance: cooperative, no acute distress,   HEENT: PERRL, conjunctiva clear, EOM's intact, non injected pharynx, no exudate, TM   normal  Neck: Supple, , no adenopathy, thyroid: not enlarged, no carotid bruit or JVD  Back: Symmetric, no  tenderness,no soft tissue tenderness  Lungs: Clear to auscultation bilateral,no adventitious breath sounds, normal   expiratory phase  Heart: Regular rate and rhythm, S1, S2 normal, no murmur, rub or gallop  Abdomen: Soft, non-tender, bowel sounds active , no hepatosplenomegaly  Extremities: no cyanosis or edema, no joint swelling  Skin: Skin color, texture normal, no rashes   Neurologic: Alert and oriented X3 , cranial nerves intact, sensory and motor normal,    ECG:    LABS:                          7.0    7.5   )-----------( 271      ( 30 Mar 2017 05:24 )             21.9                           7.1    6.9   )-----------( 264      ( 29 Mar 2017 05:51 )             22.0                        28 Mar 2017 06:03    149    |  117    |  82     ----------------------------<  125    4.3     |  24     |  1.98     Ca    7.8        28 Mar 2017 06:03                    7.7    8.8   )-----------( 239      ( 27 Mar 2017 05:49 )             24.0       27 Mar 2017 05:49    151    |  117    |  77     ----------------------------<  110    4.1     |  23     |  1.87     Ca    8.0        27 Mar 2017 05:49                 8.2    x     )-----------( x        ( 23 Mar 2017 07:54 )             25.4     23 Mar 2017 05:00    151    |  118    |  107    ----------------------------<  113    4.5     |  23     |  2.50     Ca    7.8        23 Mar 2017 05:00    TPro  5.7    /  Alb  2.4    /  TBili  0.5    /  DBili  x      /  AST  23     /  ALT  11     /  AlkPhos  35     21 Mar 2017 09:55    CARDIAC MARKERS ( 21 Mar 2017 17:23 )  0.028 ng/mL / x     / 196 U/L / x     / x      CARDIAC MARKERS ( 21 Mar 2017 09:55 )  0.023 ng/mL / x     / 172 U/L / x     / x            Pro BNP  1025 03-21 @ 09:55  D Dimer  -- 03-21 @ 09:55    PT/INR - ( 21 Mar 2017 09:55 )   PT: 11.9 sec;   INR: 1.08 ratio    cxr noted  PROCEDURE DATE:  03/29/2017        INTERPRETATION:  Single view chest    History CHF    Comparison 6 days ago    There is interval development of mild left retrocardiac volume loss   without layering effusion or licha central edema. The heart is normal in   size for projection.    IMPRESSION:    Retrocardiac opacity consistent with atelectasis or pneumonia     PTT - ( 21 Mar 2017 09:55 )  PTT:35.5 sec  INTERPRETATION:  Exam Date: 3/21/2017 7:33 AM    History: Back pain    Technique: Single frontal portable view of the chest with comparison to    10/20/2016    Findings:    Studies limited by rotation.    The heart is enlarged. No focal consolidation. The apices and   hemidiaphragms are unremarkable. Degenerative changes of the visualized   osseous structures.        Impression:EXAM:  CHEST SINGLE VIEW FRONTAL                            PROCEDURE DATE:  03/23/2017        INTERPRETATION:  Views:1  Comparison: 2 days ago  History: CHF    The lungs are clear of infiltrates and effusions.     The cardiac and   mediastinal contours appear unremarkable.     Impression:  1. No evidence of acute pulmonary disease.      Cardiomegaly          ABG - ( 23 Mar 2017 11:07 )  pH: 7.40  /  pCO2: 35    /  pO2: 109   / HCO3: 21    / Base Excess: -3    /  SaO2: 99            28 Mar 2017 06:03    149    |  117    |  82     ----------------------------<  125    4.3     |  24     |  1.98     Ca    7.8        28 Mar 2017 06:03        RADIOLOGY & ADDITIONAL STUDIES:

## 2017-03-30 NOTE — PROGRESS NOTE ADULT - SUBJECTIVE AND OBJECTIVE BOX
82 y/o male with PMHx of COPD, CHF, chronic renal failure, paroxysmal A-fib.with last hospitaliztion 10/2017 for hematochezia s/p bleeding scan and flex sigmoidoscopy  was BIBA for acute worsening lower back pain radiating to buttock to foot.  In Ed patient noted to have Hb 6 and stool guaiac positive  and rectal exam- with cookie  done by ED physician as per note.    Pt was admitted to telemetry and anemia was evaluated with EGD/colonscopy that revealed  non-bleeding ulcers (one with eschar) and colonic mass. He required 8 units PRBCs to maintain Hb>7. He was also evaluated by vascular and wound care for BLE vascular ulcerations. His unna boots were stopped and dressings applied daily. He also had whirlpool baths of his BLE.     Anticipate discharge  when Hb can remain stable. Tentative plan is for rehab for PT and continued wound care.      Assessment and Plan:   		  82 y/o male with PMHx of COPD, CHF, chronic renal failure, paroxysmal A-fib.with last hospitalization 10/2017 for hematochezia s/p bleeding scan and flex sigmoidoscopy that is admitted for low hgb likely secondary to GIB from gastric ulcer.      Problem/Plan - 1: Acute Blood Loss Anemia due to UGIB associated with Gastric Ulcer  -Hb 6.3 on arrival, s/p 7 units PRBCs per nursing. Hb low this AM, so will transfuse unit 8  -EGD:4 non-bleeding cratered ulcers fundus/curvature of gastric body, the largest 15mm in dimension with eschar not amenable to intervention  -Colon: nonobstructing 3 cmx 7cm mass in transverse colon, no bleeding. limited prep  -Protonix gtt, to continue per GI, carafate added  -Diet advanced to low fiber.   -hold iron given confounding dark stool and iron load with PRBCs (iron low per labs)    Problem/Plan - 2: SHAYY with chronic hypoxic, hypercapnic respiratory failure superimposed on COPD  -Bipap while asleep, though pt did not wear for 2 days  -Daytime O2 at 2LNC  -no longer smoking      Problem/Plan - 3: BLE venous stasis ulcers  -PTA, wound care with Dr Cardenas with xeroform, kerlix and ace bandages for dressings.    -Evaluation by vascular this admission  -wound care nurse evaluation: LLE with erythema and scattered ulcerations draining tan discharge with foul odor. RLE with erythema and scattered ulcerations draining tan discharge with foul odor. RLE with an ulceration to the lateral malleolus with tan tissue measuring [id number]. Dopplers used to auscultate dorsalis pedal pulses, L > R.   -wound care recs: At this time, patient would greatly benefit from a whirlpool treatment to both lower extremities to remove debris and assist with odor control. Patient remains in his Atmos[employer] 9000 MRS on his backside until he whirlpool arrives. 1) Daily Whirlpool treatments with 2 drops of hibiclens 2) After whirlpool apply Xeroform, kerlix and ace bandage. 3) Elevate extremities off mattress. 4) Turn and position every 2 hours  -investigate surgical shoes for feet (ordered via nursing). currently wearing cut crocs.    Problem/Plan - 4: ARYA on CKD IV   -cr 3.5 on arrival, now 1.98  -lasix resumed  -renal also following    Problem/Plan - 5: Essential hypertension  -c/w current management BP stable over course of admission.     Problem/Plan - 6: Hypovolemic Hypernatremia, hyperchloremia  -Diet advanced, encourage free water  -D5W overnight, but will stop with renal starting lasix  -defer electrolyte mgmt to renal    Problem/Plan - 7: Spinal stenosis  -Gabapentin  -PT eval for lower extremity weakness- rehab recommended      Problem/Plan - 8:Paroxysmal Atrial Fibrillation  -rate control with metoprolol, diltiazem  -no amiodarone due to underlying lung capacity  -CHADS2=2.  no AC given GIB    Problem/Plan - 9:Morbid Obesity  -BMI 37 by chart, but likely he needs accurate weight recorded  -high hip: waste ratio  -nutrition consult when diet advanced  -pt not open to changing diet as multiple complex carbs already at bedside in anticipation of advanced diet (cookies, rolls, crackers, donuts)    Problem/Plan - 10: Coronary artery disease  -Chronic diastolic CHF, CAD s/p PCI with stent in 2002, Last cath in july 2014 with RCA disease   -BBlockers, fibrates, statin  -no ASA due to bleeding 80 y/o male with PMHx of COPD, CHF, chronic renal failure, paroxysmal A-fib.with last hospitaliztion 10/2017 for hematochezia s/p bleeding scan and flex sigmoidoscopy  was BIBA for acute worsening lower back pain radiating to buttock to foot.  In Ed patient noted to have Hb 6 and stool guaiac positive  and rectal exam- with cookie  done by ED physician as per note.    Pt was admitted to telemetry and anemia was evaluated with EGD/colonscopy that revealed  non-bleeding ulcers (one with eschar) and colonic mass. He required 8 units PRBCs to maintain Hb>7. He was also evaluated by vascular and wound care for BLE vascular ulcerations. His unna boots were stopped and dressings applied daily. He also had whirlpool baths of his BLE.     Anticipate discharge  when Hb can remain stable. Tentative plan is for rehab for PT and continued wound care.      Assessment and Plan:   		  80 y/o male with PMHx of COPD, CHF, chronic renal failure, paroxysmal A-fib.with last hospitalization 10/2017 for hematochezia s/p bleeding scan and flex sigmoidoscopy that is admitted for low hgb likely secondary to GIB from gastric ulcer.      Problem/Plan - 1: Acute Blood Loss Anemia due to UGIB associated with Gastric Ulcer  -Hb 6.3 on arrival, s/p 7 units PRBCs per nursing. Hb low this AM, so will transfuse unit 8  -EGD:4 non-bleeding cratered ulcers fundus/curvature of gastric body, the largest 15mm in dimension with eschar not amenable to intervention  -Colon: nonobstructing 3 cmx 7cm mass in transverse colon, no bleeding. limited prep  -Protonix gtt, to continue per GI, carafate added  -Diet advanced to low fiber.   -hold iron given confounding dark stool and iron load with PRBCs (iron low per labs)    Problem/Plan - 2: SHAYY with chronic hypoxic, hypercapnic respiratory failure superimposed on COPD  -Bipap while asleep, though pt did not wear for 2 days  -Daytime O2 at 2LNC  -no longer smoking      Problem/Plan - 3: BLE venous stasis ulcers  -PTA, wound care with Dr Cardenas with xeroform, kerlix and ace bandages for dressings.    -Evaluation by vascular this admission  -wound care nurse evaluation: LLE with erythema and scattered ulcerations draining tan discharge with foul odor. RLE with erythema and scattered ulcerations draining tan discharge with foul odor. RLE with an ulceration to the lateral malleolus with tan tissue measuring [id number]. Dopplers used to auscultate dorsalis pedal pulses, L > R.   -wound care recs: At this time, patient would greatly benefit from a whirlpool treatment to both lower extremities to remove debris and assist with odor control. Patient remains in his [employer]mos[employer] 9000 MRS on his backside until he whirlpool arrives. 1) Daily Whirlpool treatments with 2 drops of hibiclens 2) After whirlpool apply Xeroform, kerlix and ace bandage. 3) Elevate extremities off mattress. 4) Turn and position every 2 hours  -investigate surgical shoes for feet (ordered via nursing). currently wearing cut crocs.    Problem/Plan - 4: ARYA on CKD IV   -cr 3.5 on arrival, now 1.98  -lasix resumed  -renal also following    Problem/Plan - 5: Essential hypertension  -c/w current management BP stable over course of admission.     Problem/Plan - 6: Hypovolemic Hypernatremia, hyperchloremia  -Diet advanced, encourage free water  -D5W overnight, but will stop with renal starting lasix  -defer electrolyte mgmt to renal    Problem/Plan - 7: Spinal stenosis  -Gabapentin  -PT eval for lower extremity weakness- rehab recommended      Problem/Plan - 8:Paroxysmal Atrial Fibrillation  -rate control with metoprolol, diltiazem  -no amiodarone due to underlying lung capacity  -CHADS2=2.  no AC given GIB    Problem/Plan - 9:Morbid Obesity  -BMI 37 by chart, but likely he needs accurate weight recorded  -high hip: waste ratio  -nutrition consult when diet advanced  -pt not open to changing diet as multiple complex carbs already at bedside in anticipation of advanced diet (cookies, rolls, crackers, donuts)    Problem/Plan - 10: Coronary artery disease  -Chronic diastolic CHF, CAD s/p PCI with stent in 2002, Last cath in july 2014 with RCA disease   -BBlockers, fibrates, statin  -no ASA due to bleeding 82 y/o male with PMHx of COPD, CHF, chronic renal failure, paroxysmal A-fib.with last hospitaliztion 10/2017 for hematochezia s/p bleeding scan and flex sigmoidoscopy  was BIBA for acute worsening lower back pain radiating to buttock to foot.  In Ed patient noted to have Hb 6 and stool guaiac positive  and rectal exam- with cookie  done by ED physician as per note.    Pt was admitted to telemetry and anemia was evaluated with EGD/colonscopy that revealed  non-bleeding ulcers (one with eschar) and colonic mass. He required 8 units PRBCs to maintain Hb>7. He was also evaluated by vascular and wound care for BLE vascular ulcerations. His unna boots were stopped and dressings applied daily. He also had whirlpool baths of his BLE.     Anticipate discharge  when Hb can remain stable. Tentative plan is for rehab for PT and continued wound care.        82 year old man w/ PMH of HTN, COPD on home O2 2L, SHAYY on nocturnal BIPAP, ex-smoker (smoked 1ppd X 50 years, quit 22 years ago),  Chronic diastolic CHF, CAD s/p PCI with stent in 2002, Last cath in july 2014 with RCA disease medically managed, Hyperlipidemia, PVD, Iron deficiency anemia,Chronic back pain, Gout, BPH, CKD III with baseline Cr 2.2,  PAF not on a/c, Hx of GI Bleed in the past 10/16 had sigmoidoscopy , hemorrhoids s/p banding, family hx of htn, dm, colon ca, now presenting with low hb. Patient states he went to his pcp and was found to have low hb and presented to ER. In Er he was found to have hb of 6.3.  Found to have bun/cr of 114/3.51.     1-Acute anemia , likely secondary to blood loss anemia  -Patients denies noticing any blood in stool or urine, as well has any hemetemsis  -His baseline hb is around 8.4 , admitted with 6.3  -s/p 4 units; will order 1 more to keep hb above 8 given multiple co-morbidities  -hb 7.2 > 7.7 > 7.4 (after 4  units) >7.9 >8.1> 8.4 (after 5 units)  -His GI doc is Dr. Koch, consult appreciated  -no active bleeding at this time. however ongoing drop in hb. Dr. Koch will take patient for EGD /COlo in AM  - Haptoglobin is negative for hemolysis  -send fecal occult  -Reticulocytes : 8.6, unliikely to be bone marrow related, however, if scope does not reveal any source of bleeding, then consider heme/onc consultation    2-CAD w/ recent stent placement  -cardio on board  -recco to take off aspirin temporarily till gi bleed resolves  -will re-evaluate when hb more stable      3-Chronic copd  w/ chronic respiratory failure on home o2 and bipap nightly  -c/w night bipap and supplemental o2  -currently comfortable and not in exacerbation    4- Acute renal failure on stage III CKD possibly secondary to ATN  -baseline creatinine is 2.4 (2017)  -admited with 3.51 > 105/2.83 > 107/ 2.5  -likley secondary to ATN secondary to to volume loss  -nephrology services on board  -will continue to volume replenish and repeat am bmp  -hold lasix as per nephro    5- Acute back pain  -on admit pateint c/o back pain, however he did not verbalize these concerns to me. This pain is likley acute on chronic  -spinal saw him, recco MRI  -mri reveals chronic lumbar changes. recco conservative managment.      6-Paroxysmal atrial fibrillation.  Plan: currently in sinus rhythm on EKG   not on AC due to hx of GI bleed.     7-Chronic diastolic congestive heart failure.   -not in exacerbation    8-DVt proph- b/l SCDS only secondary to gi bleed    9-mild hypernatremia- possible secondary to the blood tranfusion/ mild dehydration  -promote oral fluid intake.   -hold lasix            Assessment and Plan:   		  82 y/o male with PMHx of COPD, CHF, chronic renal failure, paroxysmal A-fib.with last hospitalization 10/2017 for hematochezia s/p bleeding scan and flex sigmoidoscopy that is admitted for low hgb likely secondary to GIB from gastric ulcer.      Problem/Plan - 1: Acute Blood Loss Anemia due to UGIB associated with Gastric Ulcer  -Hb 6.3 on arrival, s/p 7 units PRBCs per nursing. Hb low this AM, so will transfuse unit 8  -EGD:4 non-bleeding cratered ulcers fundus/curvature of gastric body, the largest 15mm in dimension with eschar not amenable to intervention  -Colon: nonobstructing 3 cmx 7cm mass in transverse colon, no bleeding. limited prep  -Protonix gtt, to continue per GI, carafate added  -Diet advanced to low fiber.   -hold iron given confounding dark stool and iron load with PRBCs (iron low per labs)    Problem/Plan - 2: SHAYY with chronic hypoxic, hypercapnic respiratory failure superimposed on COPD  -Bipap while asleep, though pt did not wear for 2 days  -Daytime O2 at 2LNC  -no longer smoking      Problem/Plan - 3: BLE venous stasis ulcers  -PTA, wound care with Dr Cardenas with xeroform, kerlix and ace bandages for dressings.    -Evaluation by vascular this admission  -wound care nurse evaluation: LLE with erythema and scattered ulcerations draining tan discharge with foul odor. RLE with erythema and scattered ulcerations draining tan discharge with foul odor. RLE with an ulceration to the lateral malleolus with tan tissue measuring [id number]. Dopplers used to auscultate dorsalis pedal pulses, L > R.   -wound care recs: At this time, patient would greatly benefit from a whirlpool treatment to both lower extremities to remove debris and assist with odor control. Patient remains in his [employer]mos[employer] 9000 MRS on his backside until he whirlpool arrives. 1) Daily Whirlpool treatments with 2 drops of hibiclens 2) After whirlpool apply Xeroform, kerlix and ace bandage. 3) Elevate extremities off mattress. 4) Turn and position every 2 hours  -investigate surgical shoes for feet (ordered via nursing). currently wearing cut crocs.    Problem/Plan - 4: ARYA on CKD IV   -cr 3.5 on arrival, now 1.98  -lasix resumed  -renal also following    Problem/Plan - 5: Essential hypertension  -c/w current management BP stable over course of admission.     Problem/Plan - 6: Hypovolemic Hypernatremia, hyperchloremia  -Diet advanced, encourage free water  -D5W overnight, but will stop with renal starting lasix  -defer electrolyte mgmt to renal    Problem/Plan - 7: Spinal stenosis  -Gabapentin  -PT eval for lower extremity weakness- rehab recommended      Problem/Plan - 8:Paroxysmal Atrial Fibrillation  -rate control with metoprolol, diltiazem  -no amiodarone due to underlying lung capacity  -CHADS2=2.  no AC given GIB    Problem/Plan - 9:Morbid Obesity  -BMI 37 by chart, but likely he needs accurate weight recorded  -high hip: waste ratio  -nutrition consult when diet advanced  -pt not open to changing diet as multiple complex carbs already at bedside in anticipation of advanced diet (cookies, rolls, crackers, donuts)    Problem/Plan - 10: Coronary artery disease  -Chronic diastolic CHF, CAD s/p PCI with stent in 2002, Last cath in july 2014 with RCA disease   -BBlockers, fibrates, statin  -no ASA due to bleeding CHIEF COMPLAINT:low hb    SUBJECTIVE: no complaints    Objective: nad, axox3    REVIEW OF SYSTEMS:    CONSTITUTIONAL: No weakness, fevers or chills  EYES/ENT: No visual changes;  No vertigo or throat pain   NECK: No pain or stiffness  RESPIRATORY: No cough, wheezing, hemoptysis; No shortness of breath  CARDIOVASCULAR: No chest pain or palpitations  GASTROINTESTINAL: No abdominal or epigastric pain. No nausea, vomiting, or hematemesis; No diarrhea or constipation. No melena or hematochezia.  GENITOURINARY: No dysuria, frequency or hematuria  NEUROLOGICAL: No numbness or weakness  SKIN: No itching, burning, rashes, or lesions   All other review of systems is negative unless indicated above    Vital Signs Last 24 Hrs  T(C): 37.2, Max: 37.7 (03-29 @ 21:41)  T(F): 98.9, Max: 99.8 (03-29 @ 21:41)  HR: 91 (53 - 96)  BP: 142/28 (132/31 - 159/31)  BP(mean): --  RR: 18 (18 - 18)  SpO2: 98% (95% - 98%)        PHYSICAL EXAM:    Constitutional: NAD, awake and alert, well-developed  HEENT: PERR, EOMI, Normal Hearing, MMM  Neck: Soft and supple, No LAD, No JVD  Respiratory: Breath sounds are clear bilaterally, No wheezing, rales or rhonchi  Cardiovascular: S1 and S2, regular rate and rhythm, no Murmurs, gallops or rubs  Gastrointestinal: Bowel Sounds present, soft, nontender, nondistended, no guarding, no rebound  Extremities: No peripheral edema  Vascular: 2+ peripheral pulses  Neurological: A/O x 3, no focal deficits  Musculoskeletal: 5/5 strength b/l upper and lower extremities  Skin: No rashes    MEDICATIONS:  MEDICATIONS  (STANDING):  finasteride 5milliGRAM(s) Oral daily  buDESOnide   0.5 milliGRAM(s) Respule 0.5milliGRAM(s) Nebulizer two times a day  doxazosin 8milliGRAM(s) Oral at bedtime  isosorbide   mononitrate ER Tablet (IMDUR) 120milliGRAM(s) Oral daily  gabapentin Oral Tab/Cap - Peds 600milliGRAM(s) Oral at bedtime  allopurinol 100milliGRAM(s) Oral daily  loratadine 10milliGRAM(s) Oral daily  fenofibrate Tablet 145milliGRAM(s) Oral daily  simvastatin 10milliGRAM(s) Oral at bedtime  pramipexole 0.125milliGRAM(s) Oral three times a day  metoprolol Oral Tab/Cap - Peds 12.5milliGRAM(s) Oral two times a day  hydrALAZINE 150milliGRAM(s) Oral two times a day  ammonium lactate 12% Lotion 1Application(s) Topical two times a day  diltiazem   CD 240milliGRAM(s) Oral daily  senna 2Tablet(s) Oral at bedtime  glycerin Suppository - Adult 1Suppository(s) Rectal daily  acetaminophen   Tablet 650milliGRAM(s) Oral every 6 hours  pantoprazole Infusion 8mG/Hr IV Continuous <Continuous>  fluticasone propionate 50 MICROgram(s)/spray Nasal Spray 1Spray(s) Both Nostrils two times a day      LABS: All Labs Reviewed:                        7.0    7.5   )-----------( 271      ( 30 Mar 2017 05:24 )             21.9     30 Mar 2017 05:24    149    |  116    |  97     ----------------------------<  119    4.6     |  22     |  2.27     Ca    7.9        30 Mar 2017 05:24  Phos  2.6       30 Mar 2017 05:24    TPro  x      /  Alb  1.8    /  TBili  x      /  DBili  x      /  AST  x      /  ALT  x      /  AlkPhos  x      30 Mar 2017 05:24            82 year old man w/ PMH of HTN, COPD on home O2 2L, SHAYY on nocturnal BIPAP, ex-smoker (smoked 1ppd X 50 years, quit 22 years ago),  Chronic diastolic CHF, CAD s/p PCI with stent in 2002, Last cath in july 2014 with RCA disease medically managed, Hyperlipidemia, PVD, Iron deficiency anemia,Chronic back pain, Gout, BPH, CKD III with baseline Cr 2.2,  PAF not on a/c, Hx of GI Bleed in the past 10/16 had sigmoidoscopy , hemorrhoids s/p banding, family hx of htn, dm, colon ca, now presenting with low hb. Patient states he went to his pcp and was found to have low hb and presented to ER. In Er he was found to have hb of 6.3.  Found to have bun/cr of 114/3.51.     1-Acute anemia , likely secondary to blood loss anemia  -Patients denies noticing any blood in stool or urine, as well has any hemetemsis  -His baseline hb is around 8.4 , admitted with 6.3  -s/p 10 units since admission  - EGD/colonscopy that revealed  non-bleeding ulcers (one with eschar) and colonic mass.   -hb fluctuating around 7.5 despite 10 units of blood  -His GI doc is Dr. Koch, consult appreciated  -last night dark black stool. Dr. Koch will take patient for EGD /COlo in AM  -Bleeding scan today  - Haptoglobin is negative for hemolysis  -Reticulocytes : 8.6, unliikely to be bone marrow related    2-CAD w/ recent stent placement  -cardio on board  -recco to take off aspirin temporarily till gi bleed resolves  -will re-evaluate when hb more stable      3-Chronic SHAYY and  copd  w/ chronic respiratory failure on home o2 and bipap nightly  -c/w night bipap and supplemental o2  -currently comfortable and not in exacerbation    4- Acute renal failure on stage III CKD possibly secondary to ATN  -baseline creatinine is 2.4 (2017)  -admited with 3.51 > 105/2.83 > 107/ 2.5  -likley secondary to ATN secondary to to volume loss  -nephrology services on board  -will continue to volume replenish and repeat am bmp  -hold lasix as per nephro or use intermittently    5- Acute back 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 Facet osteophytic hypertrophy is noted at C   L3-4 through L5-S1 with multiple posterior projecting synovial cyst   involving the LEFT L3-4, BILATERAL L4-5 and BILATERAL L5-S1 facet joints.  -physical therapy for discharge  -pain control    6-Paroxysmal atrial fibrillation.  Plan: currently in sinus rhythm on EKG   not on AC due to hx of GI bleed.     7-Chronic diastolic congestive heart failure.   -not in exacerbation    8-DVt proph- b/l SCDS only secondary to gi bleed    9-mild hypernatremia- possible secondary to mild dehydration  -promote oral fluid intake.   -lasix is being given symptomatically      10 - b/l  chronic venous stasis dermatitis and ulcer on the right lower extremity anterior surface  -PTA, wound care with Dr Cardenas    -"wound care nurse evaluation and consultationa pprecaited.   -wound care recs: patient has been undergoing whirlpool treatment to both lower extremities to remove debris and assist with odor control. 1) Daily Whirlpool treatments with 2 drops of hibiclens 2) After whirlpool apply Xeroform, kerlix and ace bandage. 3) Elevate extremities off mattress. 4) Turn and position every 2 hours  -investigate surgical shoes for feet (ordered via nursing). currently wearing cut crocs."  -Patient initally on my encounter on admit had a foul smell to the lower extremities b/l, which is now resolved.  -some persisent edema, which has slighlty increased. Will given 20ivp lasix today.       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         13-:Morbid Obesity  -bMI >35  -nutrition consult appreicated    14- acute decompensation secondary to prolonged hospitaliztion  -patient appears more lethargic and fatigued as hospitilization prolongs  -for nursing to keep him OOB at least BID  -phyiscal therapy involvment daily  -ambulation w/ pt and nursing when possible  -DC when stable to rehab. CHIEF COMPLAINT:low hb    SUBJECTIVE: no complaints    Objective: nad, axox3    REVIEW OF SYSTEMS:    CONSTITUTIONAL: No weakness, fevers or chills  EYES/ENT: No visual changes;  No vertigo or throat pain   NECK: No pain or stiffness  RESPIRATORY: No cough, wheezing, hemoptysis; No shortness of breath  CARDIOVASCULAR: No chest pain or palpitations  GASTROINTESTINAL: No abdominal or epigastric pain. No nausea, vomiting, or hematemesis; No diarrhea or constipation. No melena or hematochezia.  GENITOURINARY: No dysuria, frequency or hematuria  NEUROLOGICAL: No numbness or weakness  SKIN: No itching, burning, rashes, or lesions   All other review of systems is negative unless indicated above    Vital Signs Last 24 Hrs  T(C): 37.2, Max: 37.7 (03-29 @ 21:41)  T(F): 98.9, Max: 99.8 (03-29 @ 21:41)  HR: 91 (53 - 96)  BP: 142/28 (132/31 - 159/31)  BP(mean): --  RR: 18 (18 - 18)  SpO2: 98% (95% - 98%)        PHYSICAL EXAM:    Constitutional: NAD, awake and alert, well-developed  HEENT: PERR, EOMI, Normal Hearing, MMM  Neck: Soft and supple, No LAD, No JVD  Respiratory: Breath sounds are clear bilaterally, No wheezing, rales or rhonchi  Cardiovascular: S1 and S2, regular rate and rhythm, no Murmurs, gallops or rubs  Gastrointestinal: Bowel Sounds present, soft, nontender, nondistended, no guarding, no rebound  Extremities: No peripheral edema  Vascular: 2+ peripheral pulses  Neurological: A/O x 3, no focal deficits  Musculoskeletal: 5/5 strength b/l upper and lower extremities  Skin: No rashes    MEDICATIONS:  MEDICATIONS  (STANDING):  finasteride 5milliGRAM(s) Oral daily  buDESOnide   0.5 milliGRAM(s) Respule 0.5milliGRAM(s) Nebulizer two times a day  doxazosin 8milliGRAM(s) Oral at bedtime  isosorbide   mononitrate ER Tablet (IMDUR) 120milliGRAM(s) Oral daily  gabapentin Oral Tab/Cap - Peds 600milliGRAM(s) Oral at bedtime  allopurinol 100milliGRAM(s) Oral daily  loratadine 10milliGRAM(s) Oral daily  fenofibrate Tablet 145milliGRAM(s) Oral daily  simvastatin 10milliGRAM(s) Oral at bedtime  pramipexole 0.125milliGRAM(s) Oral three times a day  metoprolol Oral Tab/Cap - Peds 12.5milliGRAM(s) Oral two times a day  hydrALAZINE 150milliGRAM(s) Oral two times a day  ammonium lactate 12% Lotion 1Application(s) Topical two times a day  diltiazem   CD 240milliGRAM(s) Oral daily  senna 2Tablet(s) Oral at bedtime  glycerin Suppository - Adult 1Suppository(s) Rectal daily  acetaminophen   Tablet 650milliGRAM(s) Oral every 6 hours  pantoprazole Infusion 8mG/Hr IV Continuous <Continuous>  fluticasone propionate 50 MICROgram(s)/spray Nasal Spray 1Spray(s) Both Nostrils two times a day      LABS: All Labs Reviewed:                        7.0    7.5   )-----------( 271      ( 30 Mar 2017 05:24 )             21.9     30 Mar 2017 05:24    149    |  116    |  97     ----------------------------<  119    4.6     |  22     |  2.27     Ca    7.9        30 Mar 2017 05:24  Phos  2.6       30 Mar 2017 05:24    TPro  x      /  Alb  1.8    /  TBili  x      /  DBili  x      /  AST  x      /  ALT  x      /  AlkPhos  x      30 Mar 2017 05:24            82 year old man w/ PMH of HTN, COPD on home O2 2L, SHAYY on nocturnal BIPAP, ex-smoker (smoked 1ppd X 50 years, quit 22 years ago),  Chronic diastolic CHF, CAD s/p PCI with stent in 2002, Last cath in july 2014 with RCA disease medically managed, Hyperlipidemia, PVD, Iron deficiency anemia,Chronic back pain, Gout, BPH, CKD III with baseline Cr 2.2,  PAF not on a/c, Hx of GI Bleed in the past 10/16 had sigmoidoscopy , hemorrhoids s/p banding, family hx of htn, dm, colon ca, now presenting with low hb. Patient states he went to his pcp and was found to have low hb and presented to ER. In Er he was found to have hb of 6.3.  Found to have bun/cr of 114/3.51.     1-Acute anemia , likely secondary to blood loss anemia  -Patients denies noticing any blood in stool or urine, as well has any hemetemsis  -His baseline hb is around 8.4 , admitted with 6.3  -s/p 10 units since admission  - EGD/colonscopy that revealed  non-bleeding ulcers (one with eschar) and colonic mass.   -hb fluctuating around 7.5 despite 11 units of blood by end of today  -His GI doc is Dr. Koch, consult appreciated  -last night dark black stool. Dr. Koch will take patient for EGD /COlo in AM  -Bleeding scan today  - Haptoglobin is negative for hemolysis  -Reticulocytes : 8.6, unliikely to be bone marrow related  -Surgery consultation Dr. Sen ,  recco bleeding scan  and localization of likely bleed prior to any surgical intervention.    2-CAD w/ recent stent placement  -cardio on board  -recco to take off aspirin temporarily till gi bleed resolves  -will re-evaluate when hb more stable      3-Chronic SHAYY and  copd  w/ chronic respiratory failure on home o2 and bipap nightly  -c/w night bipap and supplemental o2  -currently comfortable and not in exacerbation    4- Acute renal failure on stage III CKD possibly secondary to ATN  -baseline creatinine is 2.4 (2017)  -admited with 3.51 > 105/2.83 > 107/ 2.5  -trend: 2.7> 1.87 > 1.98>2.27 (today)  -likley secondary to ATN secondary to to volume loss  -nephrology services on board  -will continue to volume replenish and repeat am bmp  -hold lasix as per nephro or use intermittently    5- Acute back 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 Facet osteophytic hypertrophy is noted at C   L3-4 through L5-S1 with multiple posterior projecting synovial cyst   involving the LEFT L3-4, BILATERAL L4-5 and BILATERAL L5-S1 facet joints.  -physical therapy for discharge  -pain control    6-Paroxysmal atrial fibrillation.  Plan: currently in sinus rhythm on EKG   not on AC due to hx of GI bleed.     7-Chronic diastolic congestive heart failure.   -not in exacerbation    8-DVt proph- b/l SCDS only secondary to gi bleed    9-mild hypernatremia- possible secondary to mild dehydration  -promote oral fluid intake.   -lasix is being given symptomatically      10 - b/l  chronic venous stasis dermatitis and ulcer on the right lower extremity anterior surface  -PTA, wound care with Dr Cardenas    -"wound care nurse evaluation and consultationa pprecaited.   -wound care recs: patient has been undergoing whirlpool treatment to both lower extremities to remove debris and assist with odor control. 1) Daily Whirlpool treatments with 2 drops of hibiclens 2) After whirlpool apply Xeroform, kerlix and ace bandage. 3) Elevate extremities off mattress. 4) Turn and position every 2 hours  -investigate surgical shoes for feet (ordered via nursing). currently wearing cut crocs."  -Patient initally on my encounter on admit had a foul smell to the lower extremities b/l, which is now resolved.  -some persisent edema, which has slighlty increased. Will given 20ivp lasix today.       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         13-:Morbid Obesity  -bMI >35  -nutrition consult appreicated    14- acute decompensation secondary to prolonged hospitaliztion  -patient appears more lethargic and fatigued as hospitilization prolongs  -for nursing to keep him OOB at least BID  -phyiscal therapy involvment daily  -ambulation w/ pt and nursing when possible  -DC when stable to rehab.

## 2017-03-30 NOTE — PROGRESS NOTE ADULT - SUBJECTIVE AND OBJECTIVE BOX
Patient is a 82y old  Male who presents with a chief complaint of left leg pain radiating from left buttoch and thigh (21 Mar 2017 06:35)      HPI:  HPI: :: 82 y/o male with PMHx of COPD, CHF, chronic renal failure, paroxysmal A-fib.with last hospitaliztion 10/2017 for hematochezia s/p bleeding scan and flex sigmoidoscopy  was BIBA for acute worsening lower back pain radiating to buttock to foot   patient with amber boots for chronic venous stasis ulcers and lower extremity chronic edema ,daughter reports increased oozing from ulcers in lai past few weeks,no fever  In Ed patient noted to have Hb 6 and stool guaiac positive  and rectal exam- with cookie  done by ED physician as per note   Patient denies any chest pain or worsening of SOB ,no abdominal pain or vomiting ,no diarrhea,no efver or chills    pt comf  had mild N after eating lat night but s vomiting  pos PRBC and drop in HCT again  neg cp or sob  vida diet  mid upper abd pain that inc with movement, crampy      PMHx: ::  HTN,  COPD on home O2 2L,  SHAYY on nocturnal BIPAP,  Chronic diastolic CHF,  CAD s/p PCI with stent in 2002,  Last cath in july 2014 with RCA disease medically managed, Hyperlipidemia,  PVD,  Iron deficiency anemia,  Chronic back pain,  Gout,  BPH,  CKD III with baseline Cr 2.2,  PAF not on a/c,  Hx of GI Bleed in the past, hemorrhoids s/p banding,  PSHx: ::  Right rotator cuff repair  pilonidal cyst  Family History: ::  Father: rectal CA  Mother: HTN, DM  3 siblings: DM  Social History: ::  Tobacco: smoked 1ppd X 50 years, quit 22 years ago.  Rare ETOH use.  wife recently passed away last year (21 Mar 2017 06:35)      PAST MEDICAL & SURGICAL HISTORY:  Sepsis, due to unspecified organism: 2/2 poorly healing wounds b/l  BPH (benign prostatic hypertrophy)  Hyperlipemia  Coronary artery disease  Hypertension  Dyspepsia: On moderate exertion.  Sleep apnea, obstructive: Requires home 02 therapy, and treatment with BIPAP  Atelectasis  Pleural effusion, bilateral  Respiratory failure  Peripheral edema  CRI (chronic renal insufficiency)  Gout  CRF (chronic renal failure)  Benign prostatic hypertrophy  Spinal stenosis  Hypercholesterolemia  GERD (gastroesophageal reflux disease)  CAD (coronary artery disease)  Hypertension  S/P angioplasty with stent  Cataract of left eye  Prostate: Surgery green light procedure.  S/P rotator cuff surgery: Right  S/P angioplasty  Rotator cuff tear, right: repair      MEDICATIONS  (STANDING):  finasteride 5milliGRAM(s) Oral daily  buDESOnide   0.5 milliGRAM(s) Respule 0.5milliGRAM(s) Nebulizer two times a day  doxazosin 8milliGRAM(s) Oral at bedtime  isosorbide   mononitrate ER Tablet (IMDUR) 120milliGRAM(s) Oral daily  gabapentin Oral Tab/Cap - Peds 600milliGRAM(s) Oral at bedtime  allopurinol 100milliGRAM(s) Oral daily  loratadine 10milliGRAM(s) Oral daily  fenofibrate Tablet 145milliGRAM(s) Oral daily  simvastatin 10milliGRAM(s) Oral at bedtime  pramipexole 0.125milliGRAM(s) Oral three times a day  metoprolol Oral Tab/Cap - Peds 12.5milliGRAM(s) Oral two times a day  hydrALAZINE 150milliGRAM(s) Oral two times a day  ammonium lactate 12% Lotion 1Application(s) Topical two times a day  diltiazem   CD 240milliGRAM(s) Oral daily  senna 2Tablet(s) Oral at bedtime  glycerin Suppository - Adult 1Suppository(s) Rectal daily  acetaminophen   Tablet 650milliGRAM(s) Oral every 6 hours  pantoprazole Infusion 8mG/Hr IV Continuous <Continuous>  fluticasone propionate 50 MICROgram(s)/spray Nasal Spray 1Spray(s) Both Nostrils two times a day  furosemide   Injectable 40milliGRAM(s) IV Push daily  ALBUTerol/ipratropium for Nebulization. 3milliLiter(s) Nebulizer once    MEDICATIONS  (PRN):  nitroglycerin     SubLingual 0.4milliGRAM(s) SubLingual every 5 minutes PRN Chest Pain  ondansetron Injectable 4milliGRAM(s) IV Push every 6 hours PRN Nausea and/or Vomiting  diphenhydrAMINE   Elixir 12.5milliGRAM(s) Oral every 6 hours PRN Rash and/or Itching      Allergies    No Known Allergies    Intolerances        SOCIAL HISTORY:unchanged    FAMILY HISTORY:  No pertinent family history in first degree relatives      REVIEW OF SYSTEMS:    CONSTITUTIONAL: No weakness, fevers or chills  EYES/ENT: No visual changes;  No vertigo or throat pain   NECK: No pain or stiffness  RESPIRATORY: No cough, wheezing, hemoptysis; No shortness of breath  CARDIOVASCULAR: No chest pain or palpitations  GENITOURINARY: No dysuria, frequency or hematuria  NEUROLOGICAL: No numbness or weakness  SKIN: No itching, burning, rashes, or lesions   All other review of systems is negative unless indicated above.    Vital Signs Last 24 Hrs  T(C): 37.7, Max: 37.7 (03-29 @ 21:41)  T(F): 99.8, Max: 99.8 (03-29 @ 21:41)  HR: 86 (84 - 96)  BP: 159/31 (133/35 - 159/31)  BP(mean): --  RR: 18 (15 - 18)  SpO2: 95% (95% - 100%)    PHYSICAL EXAM:    Constitutional: NAD, well-developed  HEENT: EOMI, throat clear  Neck: No LAD, supple  Respiratory: CTA and P  Cardiovascular: S1 and S2, RRR, no M  Gastrointestinal: BS+, soft, NT/ND, neg HSM,  Extremities: No peripheral edema, neg clubing, cyanosis  Vascular: 2+ peripheral pulses  Neurological: A/O x 3, no focal deficits  Psychiatric: Normal mood, normal affect  Skin: No rashes    LABS:  CBC Full  -  ( 30 Mar 2017 05:24 )  WBC Count : 7.5 K/uL  Hemoglobin : 7.0 g/dL  Hematocrit : 21.9 %  Platelet Count - Automated : 271 K/uL  Mean Cell Volume : 92.9 fl  Mean Cell Hemoglobin : 29.6 pg  Mean Cell Hemoglobin Concentration : 31.9 gm/dL  Auto Neutrophil # : x  Auto Lymphocyte # : x  Auto Monocyte # : x  Auto Eosinophil # : x  Auto Basophil # : x  Auto Neutrophil % : x  Auto Lymphocyte % : x  Auto Monocyte % : x  Auto Eosinophil % : x  Auto Basophil % : x    30 Mar 2017 05:24    149    |  116    |  97     ----------------------------<  119    4.6     |  22     |  2.27     Ca    7.9        30 Mar 2017 05:24  Phos  2.6       30 Mar 2017 05:24    TPro  x      /  Alb  1.8    /  TBili  x      /  DBili  x      /  AST  x      /  ALT  x      /  AlkPhos  x      30 Mar 2017 05:24            RADIOLOGY & ADDITIONAL STUDIES:    EXAM:  CHEST SINGLE VIEW FRONTAL                            PROCEDURE DATE:  03/29/2017        INTERPRETATION:  Single view chest    History CHF    Comparison 6 days ago    There is interval development of mild left retrocardiac volume loss   without layering effusion or licha central edema. The heart is normal in   size for projection.    IMPRESSION:    Retrocardiac opacity consistent with atelectasis or pneumonia              STARLA PARKER   This document has been electronically signed. Mar29 2017  9:33AM

## 2017-03-30 NOTE — PROGRESS NOTE ADULT - ASSESSMENT
CKD 3  Heart Failure, edema, volume overload  Pt does not tolerate effective diuresis with elevation in his  creat..  Some azotemia will be expected and acceptable, since the patient will benefit from diuresis  May resume diuretics tomorrow and evaluate weight, creat bp etc  Anemia, for transfusion as needed

## 2017-03-30 NOTE — PROGRESS NOTE ADULT - ASSESSMENT
1. Anemia- Management pre primary team and GI team.   He is going to have EGD tomorrow per GI team.  Patient is optimized from cardiac standpoint to proceed with EGD.   Continue cardizem periprocedure as tolerated by the BP and HR.    Chronic HFpEF- will discontinue lasix iv as pt becoming hypovolemic with worsening renal function.       CAD, s/p PCI and atherectomy- pt is off anticoagulation and antiplatelet agents. Benefits outweigh risks with antiplatelet agents now.  Continue statin.      HTN- continue current meds.    Afib- currently in SR. Not on anticoagulation at this time. Per pulmonary team pt is not an optimal candidate for amiodarone since he has already low lung reserve.    5. Mechanical DVT proph.

## 2017-03-30 NOTE — PROGRESS NOTE ADULT - ASSESSMENT
80 y/o male with PMHx of COPD, CHF, chronic renal failure, paroxysmal A-fib.with last hospitaliztion 10/2017 for hematochezia s/p bleeding scan and flex sigmoidoscopy  was BIBA  now being worked up for GI bleed and has required multiple 4-5 units PRBCs done  now needs eval for upper and likley lower endoscopy  Chronic hypoxemic resp failure  CHF appears stable  P Afib now in nsr  hemodynamically stabe  SHAYY / OHS /COPD-overlap syndrome  suggest ABG and repeat cxr today to complete her workup  I've explained to pt and his DTR, critical care RN that pt is at incresaed risk of resp failure requiring mechanical ventilation with sedation, but his pulm status appears optimized presently for this indicated procedure with recent requirement to get muliple prbcs over last few days  recommend  ABG noted  cxr is clear  cont nocturnal BIPAP  Anesthesia veal before his procedure  cardiac eval in progress  will check outpt pfts, placed in chart  ABG noted  FEV1 6/2016 1.38, 68% predicted  DLCO 45% predicted  supportive care  BIPAP 12/8 with o2 2 liters attached  plan as per GI  cont current rx  supportive care required  Add flonase nasal spray   all recent data discussed with pt today 3/28  case discussed with Dr Hernández today  GI eval in progress for anemia  check repeat cxr today  may need additional endoscopy for anemia  hemodynamically stable  retrocardiac opacity likley atelectasis but ceratinly at increased risk of pneumonia  encourage incentive spirometry  monitor wbc and temps  duoneb with nebulizer rx  fu cxr in am

## 2017-03-31 LAB
ALBUMIN SERPL ELPH-MCNC: 1.8 G/DL — LOW (ref 3.3–5)
ANION GAP SERPL CALC-SCNC: 11 MMOL/L — SIGNIFICANT CHANGE UP (ref 5–17)
BUN SERPL-MCNC: 90 MG/DL — HIGH (ref 7–23)
CALCIUM SERPL-MCNC: 7.6 MG/DL — LOW (ref 8.5–10.1)
CHLORIDE SERPL-SCNC: 115 MMOL/L — HIGH (ref 96–108)
CO2 SERPL-SCNC: 24 MMOL/L — SIGNIFICANT CHANGE UP (ref 22–31)
CREAT SERPL-MCNC: 2.11 MG/DL — HIGH (ref 0.5–1.3)
GLUCOSE SERPL-MCNC: 94 MG/DL — SIGNIFICANT CHANGE UP (ref 70–99)
HCT VFR BLD CALC: 24 % — LOW (ref 39–50)
HGB BLD-MCNC: 8 G/DL — LOW (ref 13–17)
PHOSPHATE SERPL-MCNC: 2.9 MG/DL — SIGNIFICANT CHANGE UP (ref 2.5–4.5)
POTASSIUM SERPL-MCNC: 4.1 MMOL/L — SIGNIFICANT CHANGE UP (ref 3.5–5.3)
POTASSIUM SERPL-SCNC: 4.1 MMOL/L — SIGNIFICANT CHANGE UP (ref 3.5–5.3)
SODIUM SERPL-SCNC: 150 MMOL/L — HIGH (ref 135–145)

## 2017-03-31 PROCEDURE — 88305 TISSUE EXAM BY PATHOLOGIST: CPT | Mod: 26

## 2017-03-31 PROCEDURE — 71010: CPT | Mod: 26

## 2017-03-31 PROCEDURE — 88312 SPECIAL STAINS GROUP 1: CPT | Mod: 26

## 2017-03-31 RX ORDER — FUROSEMIDE 40 MG
20 TABLET ORAL ONCE
Qty: 0 | Refills: 0 | Status: COMPLETED | OUTPATIENT
Start: 2017-03-31 | End: 2017-03-31

## 2017-03-31 RX ORDER — FUROSEMIDE 40 MG
20 TABLET ORAL DAILY
Qty: 0 | Refills: 0 | Status: DISCONTINUED | OUTPATIENT
Start: 2017-03-31 | End: 2017-04-01

## 2017-03-31 RX ADMIN — Medication 1 SUPPOSITORY(S): at 14:38

## 2017-03-31 RX ADMIN — GABAPENTIN 600 MILLIGRAM(S): 400 CAPSULE ORAL at 21:37

## 2017-03-31 RX ADMIN — SIMVASTATIN 10 MILLIGRAM(S): 20 TABLET, FILM COATED ORAL at 21:37

## 2017-03-31 RX ADMIN — Medication 240 MILLIGRAM(S): at 06:06

## 2017-03-31 RX ADMIN — Medication 1 SPRAY(S): at 06:07

## 2017-03-31 RX ADMIN — Medication 1 APPLICATION(S): at 06:06

## 2017-03-31 RX ADMIN — PANTOPRAZOLE SODIUM 10 MG/HR: 20 TABLET, DELAYED RELEASE ORAL at 04:46

## 2017-03-31 RX ADMIN — Medication 12.5 MILLIGRAM(S): at 17:18

## 2017-03-31 RX ADMIN — Medication 0.5 MILLIGRAM(S): at 21:04

## 2017-03-31 RX ADMIN — Medication 150 MILLIGRAM(S): at 17:18

## 2017-03-31 RX ADMIN — Medication 8 MILLIGRAM(S): at 21:35

## 2017-03-31 RX ADMIN — Medication 20 MILLIGRAM(S): at 14:34

## 2017-03-31 RX ADMIN — Medication 20 MILLIGRAM(S): at 02:59

## 2017-03-31 RX ADMIN — Medication 1 APPLICATION(S): at 17:18

## 2017-03-31 RX ADMIN — PRAMIPEXOLE DIHYDROCHLORIDE 0.12 MILLIGRAM(S): 0.12 TABLET ORAL at 21:35

## 2017-03-31 RX ADMIN — PANTOPRAZOLE SODIUM 10 MG/HR: 20 TABLET, DELAYED RELEASE ORAL at 16:58

## 2017-03-31 RX ADMIN — Medication 1 SPRAY(S): at 17:20

## 2017-03-31 RX ADMIN — SENNA PLUS 2 TABLET(S): 8.6 TABLET ORAL at 21:35

## 2017-03-31 NOTE — CONSULT NOTE ADULT - ASSESSMENT
upper gi bleed controlled with endoscopy today.
80 y/o male with PMHx of COPD, CHF, chronic renal failure, paroxysmal A-fib.with last hospitaliztion 10/2017 for hematochezia s/p bleeding scan and flex sigmoidoscopy  was BIBA  now being worked up for GI bleed and has required multiple 4-5 units PRBCs done  now needs eval for upper and likley lower endoscopy  Chronic hypoxemic resp failure  CHF appears stable  P Afib now in nsr  hemodynamically stabe  SHAYY / OHS /COPD-overlap syndrome  suggest ABG and repeat cxr today to complete her workup  I've explained to pt and his DTR, critical care RN that pt is at incresaed risk of resp failure requiring mechanical ventilation with sedation, but his pulm status appears optimized presently for this indicated procedure with recent requirement to get muliple prbcs over last few days  recommend  ABG  cxr  cont nocturnal BIPAP  Anesthesia veal before his procedure  cardiac eval in progress  will check outpt pfts

## 2017-03-31 NOTE — DIETITIAN INITIAL EVALUATION ADULT. - PERTINENT MEDS FT
Lasix, Protonix, Zofran, Zyloprim, Pulmicort, Cardizem, Cardura, Tricor, Imdur, Claritin, Lopressor Nitrostat, Mirapex, Zocor

## 2017-03-31 NOTE — CONSULT NOTE ADULT - PROBLEM SELECTOR RECOMMENDATION 9
continue to monitor H/H. if he continues to bleed may need operative intervention. Dr. vizcarra inked the area that was bleeding. may need to oversew or resect that area.

## 2017-03-31 NOTE — CONSULT NOTE ADULT - SUBJECTIVE AND OBJECTIVE BOX
[facility]                                                           Orthopedic Spine Consultation    CHIEF COMPLAINT:  Lower back pain and right leg pain.     HPI:    83 y/o male who is seen today for consultation regarding a cc of progressive right leg and lower back pain. Patient admits long standing history of lower back pain, however symptoms have become worse x 3 weeks. Denies trauma or injury. Describes frequent episodes of  shock like pain which refers into the right buttock region and into the right leg.  Was unable to stand yesterday due to symptoms and was BIBA for evaluation.  This am patient admits his symptoms are slightly improved, however continues to have frequent episodes of shock like pain into  bilateral lower extremities  R>L.  PMHx  with multiple comorbidities including   COPD, CHF, chronic renal failure, paroxysmal A-fib.with last hospitaliztion 10/2017 for hematochezia s/p bleeding scan and flex sigmoidoscopy. Known chronic venous stasis ulcers and lower extremity chronic edema.   While in  ED patient noted to be severely anemic with guaiac positive. Comfortable during evaluation,  denies any progressive focal motor weakness or changes in bowel bladder function.     PAST MEDICAL & SURGICAL HISTORY:  Sepsis, due to unspecified organism: 2/2 poorly healing wounds b/l  BPH (benign prostatic hypertrophy)  Hyperlipemia  Coronary artery disease  Hypertension  Dyspepsia: On moderate exertion.  Sleep apnea, obstructive: Requires home 02 therapy, and treatment with BIPAP  Atelectasis  Pleural effusion, bilateral  Respiratory failure  Peripheral edema  CRI (chronic renal insufficiency)  Gout  CRF (chronic renal failure)  Benign prostatic hypertrophy  Spinal stenosis  Hypercholesterolemia  GERD (gastroesophageal reflux disease)  CAD (coronary artery disease)  Hypertension  S/P angioplasty with stent  Cataract of left eye  Prostate: Surgery green light procedure.  S/P rotator cuff surgery: Right  S/P angioplasty  Rotator cuff tear, right: repair      SOCIAL HISTORY:    REVIEW OF SYSTEMS:    CONSTITUTIONAL: No fever, weight loss, or fatigue  HEENT: Normal extraoccular movements,   NECK: See HPI  RESPIRATORY: No cough, wheezing, chills or hemoptysis; No shortness of breath  CARDIOVASCULAR: No chest pain, palpitations, dizziness, or leg swelling  GASTROINTESTINAL: No abdominal or epigastric pain. No nausea, vomiting, or hematemesis; No diarrhea or constipation. No melena or hematochezia.  GENITOURINARY: No dysuria, frequency, hematuria, or incontinence  NEUROLOGICAL: See HPI  SKIN: No itching, burning, rashes, or lesions   LYMPH NODES: No enlarged glands  ENDOCRINE: No heat or cold intolerance; No hair loss  MUSCULOSKELETAL: See HPI  PSYCHIATRIC: No depression, anxiety, mood swings, or difficulty sleeping  HEME/LYMPH: No easy bruising, or bleeding gums      Vital Signs Last 24 Hrs  T(C): 36.8, Max: 36.8 (03-21 @ 08:25)  T(F): 98.2, Max: 98.2 (03-21 @ 08:25)  HR: 101 (90 - 112)  BP: 147/59 (121/53 - 147/59)  BP(mean): --  RR: 18 (16 - 18)  SpO2: 100% (93% - 100%)  I&O's Detail      LABS:                        6.3    8.6   )-----------( 337      ( 20 Mar 2017 22:39 )             20.0     20 Mar 2017 22:39    147    |  114    |  114    ----------------------------<  124    4.4     |  23     |  3.51     Ca    8.1        20 Mar 2017 22:39    TPro  5.7    /  Alb  2.5    /  TBili  0.2    /  DBili  x      /  AST  19     /  ALT  14     /  AlkPhos  34     20 Mar 2017 22:39    PT/INR - ( 20 Mar 2017 22:39 )   PT: 11.3 sec;   INR: 1.03 ratio         PTT - ( 20 Mar 2017 22:39 )  PTT:33.6 sec      RADIOLOGY & ADDITIONAL STUDIES:    PHYSICAL EXAM:  Constitutional: NAD, well-groomed, well-developed  HEENT: PERRLA, EOMI, Normal Hearing, MMM    Gastrointestinal:   Distended BS+, soft, NT/ND  Extremities:  - Lower difficult to exam due to pressure bandages, appear intact, chronic stasis ulcers/ venous congestion.    Skin: No rashes      Lumbar: ROM : - Not tested.,     - Lumbar spine shows no evidence of edema, and is non tender to percussion   Neurological: A/O x  3             Sensation: [ ] intact to light touch  [x] decreased:   B/L lower extremities            Motor exam: [  ]                 [ ] Lower ext.     Hip Flx   Quad   Hamstrg   TA       EHL      GS                              R        3/5           3/5        5-/5        4/5     4/5      4/5                              L         4/5           4/5        5-/5        4/5     4/5      4/5                                                        [ x ] Vascular :   unable to palpate pules due to bandages.       Lumbar Xray reviewed ( Radiological report pending )  - Chronic? inferior endplate fracture T12  moderate compression deformity., Multilevel lumbar DDD L2-S1     A/P :  -Multiple Comorbidities, CHF , AFIB, Anemia being managed by medicine / Cardiology.     From a spine stand point this patient has symptoms consistent with a chronic right lumbar radiculopathy with progressive worsening symptoms of pain and subjective weakness into the right lower extremity. .,  Quad and hip flexor weakness and atrophy noted on examination. Patient does not have symptoms consistent with an acute compression fracture noted at T12, and this is likely chronic. , Recommend MRI of the lumbar spine for further evaluation of suspected lumbar foraminal /central stenosis. Treatment options based on results, will follow studies.,   Thank you for this consultation
Full note to be dictated    Hx of multiple comorbid dx    anemia  likely multifactorial with GIB and redal dx, Acdx  pt is at very high risk for endoscopic intervantion and would cont PPI and serial Hct  change PPI to gtt    hard to assess melena as he has chronic black stool on Fe tab    serial Hct, keep hgb over 8      copd, home o2  CAD, on ASA    if hct drops, would consider holding ASA      constipation, recent, self disimpaction  laxatives
Patient is a 82y old  Male who presents with a chief complaint of left leg pain radiating from left buttoch and thigh (21 Mar 2017 06:35)      HPI:  HPI: :: 80 y/o male with PMHx of COPD, CHF, chronic renal failure, paroxysmal A-fib.with last hospitaliztion 10/2017 for hematochezia s/p bleeding scan and flex sigmoidoscopy  was BIBA for acute worsening lower back pain radiating to buttock to foot   patient with amber boots for chronic venous stasis ulcers and lower extremity chronic edema ,daughter reports increased oozing from ulcers in lai past few weeks,no fever  In Ed patient noted to have Hb 6 and stool guaiac positive  and rectal exam- with cookie  done by ED physician as per note   Patient denies any chest pain or worsening of SOB ,no abdominal pain or vomiting ,no diarrhea,no efver or chills  seen and evaluated along with Dr SANTIZO  His dtr is contacted as well  PMHx: ::  HTN,  COPD on home O2 2L,  SHAYY on nocturnal BIPAP,  Chronic diastolic CHF,  CAD s/p PCI with stent in 2002,  Last cath in july 2014 with RCA disease medically managed, Hyperlipidemia,  PVD,  Iron deficiency anemia,  Chronic back pain,  Gout,  BPH,  CKD III with baseline Cr 2.2,  PAF not on a/c,  Hx of GI Bleed in the past, hemorrhoids s/p banding,  PSHx: ::  Right rotator cuff repair  pilonidal cyst  Family History: ::  Father: rectal CA  Mother: HTN, DM  3 siblings: DM  Social History: ::  Tobacco: smoked 1ppd X 50 years, quit 22 years ago.  Rare ETOH use.  wife recently passed away last year (21 Mar 2017 06:35)      PAST MEDICAL & SURGICAL HISTORY:  Sepsis, due to unspecified organism: 2/2 poorly healing wounds b/l  BPH (benign prostatic hypertrophy)  Hyperlipemia  Coronary artery disease  Hypertension  Dyspepsia: On moderate exertion.  Sleep apnea, obstructive: Requires home 02 therapy, and treatment with BIPAP  Atelectasis  Pleural effusion, bilateral  Respiratory failure  Peripheral edema  CRI (chronic renal insufficiency)  Gout  CRF (chronic renal failure)  Benign prostatic hypertrophy  Spinal stenosis  Hypercholesterolemia  GERD (gastroesophageal reflux disease)  CAD (coronary artery disease)  Hypertension  S/P angioplasty with stent  Cataract of left eye  Prostate: Surgery green light procedure.  S/P rotator cuff surgery: Right  S/P angioplasty  Rotator cuff tear, right: repair      PREVIOUS DIAGNOSTIC TESTING:      MEDICATIONS  (STANDING):  finasteride 5milliGRAM(s) Oral daily  buDESOnide   0.5 milliGRAM(s) Respule 0.5milliGRAM(s) Nebulizer two times a day  doxazosin 8milliGRAM(s) Oral at bedtime  isosorbide   mononitrate ER Tablet (IMDUR) 120milliGRAM(s) Oral daily  gabapentin Oral Tab/Cap - Peds 600milliGRAM(s) Oral at bedtime  allopurinol 100milliGRAM(s) Oral daily  loratadine 10milliGRAM(s) Oral daily  fenofibrate Tablet 145milliGRAM(s) Oral daily  simvastatin 10milliGRAM(s) Oral at bedtime  pramipexole 0.125milliGRAM(s) Oral three times a day  metoprolol Oral Tab/Cap - Peds 12.5milliGRAM(s) Oral two times a day  ferrous    sulfate 325milliGRAM(s) Oral three times a day with meals  hydrALAZINE 150milliGRAM(s) Oral two times a day  ammonium lactate 12% Lotion 1Application(s) Topical two times a day  diltiazem   CD 240milliGRAM(s) Oral daily  senna 2Tablet(s) Oral at bedtime  glycerin Suppository - Adult 1Suppository(s) Rectal daily  furosemide    Tablet 40milliGRAM(s) Oral daily  acetaminophen   Tablet 650milliGRAM(s) Oral every 6 hours  pantoprazole  Injectable 40milliGRAM(s) IV Push every 12 hours    MEDICATIONS  (PRN):  nitroglycerin     SubLingual 0.4milliGRAM(s) SubLingual every 5 minutes PRN Chest Pain      FAMILY HISTORY:  No pertinent family history in first degree relatives          REVIEW OF SYSTEM:  Pertinent items are noted in HPI.  Constitutional negative for chills, fevers, sweats and weight loss  throat, and face:  negative for epistaxis, nasal congestion, sore throat and   tinnitus  Respiratory: negative for cough,pos  dyspnea on exertion,no pleuritic chest pain  and wheezing  Cardiovascular:  negative for chest pain, dyspnea and palpitations  Gastrointestinal: negative for abdominal pain, diarrhea, nausea and vomiting  Genitourinary: negative for dysuria, frequency and urinary incontinence  Skin:  negative for redness, rash, pruritus, swelling, dryness and   fissures  Hematologic/lymphatic: negative for bleeding and easy bruising  Musculoskeletal: negative for arthralgias, back pain and muscle weakness  Neurological: negative for dizziness, headaches, seizures and tremors  Behavioral/Psych:  negative for mood change, depression, suicidal attempts    Allergic/Immunologic: negative for anaphylaxis, angioedema and urticaria    Vital Signs Last 24 Hrs  T(C): 37, Max: 37.5 (03-22 @ 23:43)  T(F): 98.6, Max: 99.5 (03-22 @ 23:43)  HR: 74 (66 - 96)  BP: 177/45 (128/35 - 178/45)  BP(mean): --  RR: 18 (16 - 18)  SpO2: 96% (95% - 100%)    I&O's Summary    I & Os for current day (as of 23 Mar 2017 09:14)  =============================================  IN: 305 ml / OUT: 0 ml / NET: 305 ml    PHYSICAL EXAM  General Appearance: cooperative, no acute distress,   HEENT: PERRL, conjunctiva clear, EOM's intact, non injected pharynx, no exudate, TM   normal  Neck: Supple, , no adenopathy, thyroid: not enlarged, no carotid bruit or JVD  Back: Symmetric, no  tenderness,no soft tissue tenderness  Lungs: Clear to auscultation bilateral,no adventitious breath sounds, normal   expiratory phase  Heart: Regular rate and rhythm, S1, S2 normal, no murmur, rub or gallop  Abdomen: Soft, non-tender, bowel sounds active , no hepatosplenomegaly  Extremities: no cyanosis or edema, no joint swelling  Skin: Skin color, texture normal, no rashes   Neurologic: Alert and oriented X3 , cranial nerves intact, sensory and motor normal,    ECG:    LABS:                              8.2    x     )-----------( x        ( 23 Mar 2017 07:54 )             25.4     23 Mar 2017 05:00    151    |  118    |  107    ----------------------------<  113    4.5     |  23     |  2.50     Ca    7.8        23 Mar 2017 05:00    TPro  5.7    /  Alb  2.4    /  TBili  0.5    /  DBili  x      /  AST  23     /  ALT  11     /  AlkPhos  35     21 Mar 2017 09:55    CARDIAC MARKERS ( 21 Mar 2017 17:23 )  0.028 ng/mL / x     / 196 U/L / x     / x      CARDIAC MARKERS ( 21 Mar 2017 09:55 )  0.023 ng/mL / x     / 172 U/L / x     / x            Pro BNP  1025 03-21 @ 09:55  D Dimer  -- 03-21 @ 09:55    PT/INR - ( 21 Mar 2017 09:55 )   PT: 11.9 sec;   INR: 1.08 ratio         PTT - ( 21 Mar 2017 09:55 )  PTT:35.5 sec  INTERPRETATION:  Exam Date: 3/21/2017 7:33 AM    History: Back pain    Technique: Single frontal portable view of the chest with comparison to    10/20/2016    Findings:    Studies limited by rotation.    The heart is enlarged. No focal consolidation. The apices and   hemidiaphragms are unremarkable. Degenerative changes of the visualized   osseous structures.        Impression:    Cardiomegaly        RADIOLOGY & ADDITIONAL STUDIES:
Patient is a 82y old  Male who presents with a chief complaint of left leg pain radiating from left buttoch and thigh (21 Mar 2017 06:35)    HPI: :: 82 y/o male with PMHx of COPD, CHF, chronic renal failure, paroxysmal A-fib.with last hospitaliztion 10/2017 for hematochezia s/p bleeding scan and flex sigmoidoscopy  was BIBA for acute worsening lower back pain radiating to buttock to foot   patient with amber boots for chronic venous stasis ulcers and lower extremity chronic edema ,daughter reports increased oozing from ulcers in lai past few weeks,no fever  In Ed patient noted to have Hb 6 and stool guaiac positive  and rectal exam- with melena  done by ED physician as per note   Patient denies any chest pain or worsening of SOB ,no abdominal pain or vomiting ,no diarrhea,no fever or chills.  The patient had an endoscopy today for continued bleeding for the last 7 days.  Dr. Begum clipped a bleeding lesion and states that he does not believe this will rebleed.    PMHx: ::  HTN,  COPD on home O2 2L,  SHAYY on nocturnal BIPAP,  Chronic diastolic CHF,  CAD s/p PCI with stent in 2002,  Last cath in july 2014 with RCA disease medically managed, Hyperlipidemia,  PVD,  Iron deficiency anemia,  Chronic back pain,  Gout,  BPH,  CKD III with baseline Cr 2.2,  PAF not on a/c,  Hx of GI Bleed in the past, hemorrhoids s/p banding,  PSHx: ::  Right rotator cuff repair  pilonidal cyst  Family History: ::  Father: rectal CA  Mother: HTN, DM  3 siblings: DM  Social History: ::  Tobacco: smoked 1ppd X 50 years, quit 22 years ago.  Rare ETOH use.  wife recently passed away last year (21 Mar 2017 06:35)      PAST MEDICAL & SURGICAL HISTORY:  Sepsis, due to unspecified organism: 2/2 poorly healing wounds b/l  BPH (benign prostatic hypertrophy)  Hyperlipemia  Coronary artery disease  Hypertension  Dyspepsia: On moderate exertion.  Sleep apnea, obstructive: Requires home 02 therapy, and treatment with BIPAP  Atelectasis  Pleural effusion, bilateral  Respiratory failure  Peripheral edema  CRI (chronic renal insufficiency)  Gout  CRF (chronic renal failure)  Benign prostatic hypertrophy  Spinal stenosis  Hypercholesterolemia  GERD (gastroesophageal reflux disease)  CAD (coronary artery disease)  Hypertension  S/P angioplasty with stent  Cataract of left eye  Prostate: Surgery green light procedure.  S/P rotator cuff surgery: Right  S/P angioplasty  Rotator cuff tear, right: repair      MEDICATIONS  (STANDING):  finasteride 5milliGRAM(s) Oral daily  buDESOnide   0.5 milliGRAM(s) Respule 0.5milliGRAM(s) Nebulizer two times a day  doxazosin 8milliGRAM(s) Oral at bedtime  isosorbide   mononitrate ER Tablet (IMDUR) 120milliGRAM(s) Oral daily  gabapentin Oral Tab/Cap - Peds 600milliGRAM(s) Oral at bedtime  allopurinol 100milliGRAM(s) Oral daily  loratadine 10milliGRAM(s) Oral daily  fenofibrate Tablet 145milliGRAM(s) Oral daily  simvastatin 10milliGRAM(s) Oral at bedtime  pramipexole 0.125milliGRAM(s) Oral three times a day  metoprolol Oral Tab/Cap - Peds 12.5milliGRAM(s) Oral two times a day  hydrALAZINE 150milliGRAM(s) Oral two times a day  ammonium lactate 12% Lotion 1Application(s) Topical two times a day  diltiazem   CD 240milliGRAM(s) Oral daily  senna 2Tablet(s) Oral at bedtime  glycerin Suppository - Adult 1Suppository(s) Rectal daily  acetaminophen   Tablet 650milliGRAM(s) Oral every 6 hours  pantoprazole Infusion 8mG/Hr IV Continuous <Continuous>  fluticasone propionate 50 MICROgram(s)/spray Nasal Spray 1Spray(s) Both Nostrils two times a day  furosemide   Injectable 40milliGRAM(s) IV Push daily  ALBUTerol/ipratropium for Nebulization. 3milliLiter(s) Nebulizer once    MEDICATIONS  (PRN):  nitroglycerin     SubLingual 0.4milliGRAM(s) SubLingual every 5 minutes PRN Chest Pain  ondansetron Injectable 4milliGRAM(s) IV Push every 6 hours PRN Nausea and/or Vomiting  diphenhydrAMINE   Elixir 12.5milliGRAM(s) Oral every 6 hours PRN Rash and/or Itching      Allergies    No Known Allergies    Intolerances        SOCIAL HISTORY:unchanged    FAMILY HISTORY:  No pertinent family history in first degree relatives      REVIEW OF SYSTEMS:    CONSTITUTIONAL: No weakness, fevers or chills  EYES/ENT: No visual changes;  No vertigo or throat pain   NECK: No pain or stiffness  RESPIRATORY: No cough, wheezing, hemoptysis; No shortness of breath  CARDIOVASCULAR: No chest pain or palpitations  GENITOURINARY: No dysuria, frequency or hematuria  NEUROLOGICAL: No numbness or weakness  SKIN: No itching, burning, rashes, or lesions   All other review of systems is negative unless indicated above.    Vital Signs Last 24 Hrs  T(C): 37.7, Max: 37.7 (03-29 @ 21:41)  T(F): 99.8, Max: 99.8 (03-29 @ 21:41)  HR: 86 (84 - 96)  BP: 159/31 (133/35 - 159/31)  BP(mean): --  RR: 18 (15 - 18)  SpO2: 95% (95% - 100%)    PHYSICAL EXAM:    Constitutional: NAD, well-developed  HEENT: EOMI, throat clear  Neck: No LAD, supple  Respiratory: CTA and P  Cardiovascular: S1 and S2, RRR, no M  Gastrointestinal: BS+, soft, NT/ND, neg HSM,  Extremities: No peripheral edema, neg clubing, cyanosis  Vascular: 2+ peripheral pulses  Neurological: A/O x 3, no focal deficits  Psychiatric: Normal mood, normal affect  Skin: No rashes    LABS:  CBC Full  -  ( 30 Mar 2017 05:24 )  WBC Count : 7.5 K/uL  Hemoglobin : 7.0 g/dL  Hematocrit : 21.9 %  Platelet Count - Automated : 271 K/uL  Mean Cell Volume : 92.9 fl  Mean Cell Hemoglobin : 29.6 pg  Mean Cell Hemoglobin Concentration : 31.9 gm/dL  Auto Neutrophil # : x  Auto Lymphocyte # : x  Auto Monocyte # : x  Auto Eosinophil # : x  Auto Basophil # : x  Auto Neutrophil % : x  Auto Lymphocyte % : x  Auto Monocyte % : x  Auto Eosinophil % : x  Auto Basophil % : x    30 Mar 2017 05:24    149    |  116    |  97     ----------------------------<  119    4.6     |  22     |  2.27     Ca    7.9        30 Mar 2017 05:24  Phos  2.6       30 Mar 2017 05:24    TPro  x      /  Alb  1.8    /  TBili  x      /  DBili  x      /  AST  x      /  ALT  x      /  AlkPhos  x      30 Mar 2017 05:24

## 2017-03-31 NOTE — PROGRESS NOTE ADULT - SUBJECTIVE AND OBJECTIVE BOX
Bleeding Scan 3/30/17:    "CLINICAL INFORMATION: 82 year old male with G.I. bleeding; referred to   localize bleeding site.     TECHNIQUE:  Dynamic images of the anterior abdomen/pelvis were obtained   for 2 hours following administration of labeled autologous red blood   cells. Static images of the abdomen/pelvis were obtained in the frontal   and lateralprojections immediately thereafter. An anterior image of the   head/neck was obtained for  purposes.    COMPARISON: None    FINDINGS: Focal accumulation of tracer in the distal stomach/proximal   duodenum which progresses distally though rthe small bowel.    IMPRESSION: Abnormal GI bleeding scan.    Active bleeding site in distal stomach/proximal proximal duodenum." CHIEF COMPLAINT: anemia    SUBJECTIVE:     REVIEW OF SYSTEMS:    CONSTITUTIONAL: No weakness, fevers or chills  EYES/ENT: No visual changes;  No vertigo or throat pain   NECK: No pain or stiffness  RESPIRATORY: No cough, wheezing, hemoptysis; No shortness of breath  CARDIOVASCULAR: No chest pain or palpitations  GASTROINTESTINAL: No abdominal or epigastric pain. No nausea, vomiting, or hematemesis; No diarrhea or constipation. No melena or hematochezia.  GENITOURINARY: No dysuria, frequency or hematuria  NEUROLOGICAL: No numbness or weakness  SKIN: No itching, burning, rashes, or lesions   All other review of systems is negative unless indicated above    Vital Signs Last 24 Hrs  T(C): 36.8, Max: 37.3 (03-30 @ 23:30)  T(F): 98.2, Max: 99.2 (03-31 @ 02:50)  HR: 73 (53 - 104)  BP: 149/86 (131/35 - 150/32)  BP(mean): --  RR: 16 (16 - 18)  SpO2: 99% (94% - 99%)    I&O's Summary    I & Os for current day (as of 31 Mar 2017 08:27)  =============================================  IN: 420.1 ml / OUT: 1500 ml / NET: -1079.9 ml      CAPILLARY BLOOD GLUCOSE      PHYSICAL EXAM:    Constitutional: NAD, awake and alert, well-developed  HEENT: PERR, EOMI, Normal Hearing, MMM  Neck: Soft and supple, No LAD, No JVD  Respiratory: Breath sounds are clear bilaterally, No wheezing, rales or rhonchi  Cardiovascular: S1 and S2, regular rate and rhythm, no Murmurs, gallops or rubs  Gastrointestinal: Bowel Sounds present, soft, nontender, nondistended, no guarding, no rebound  Extremities: No peripheral edema  Vascular: 2+ peripheral pulses  Neurological: A/O x 3, no focal deficits  Musculoskeletal: 5/5 strength b/l upper and lower extremities  Skin: No rashes    MEDICATIONS:  MEDICATIONS  (STANDING):  finasteride 5milliGRAM(s) Oral daily  buDESOnide   0.5 milliGRAM(s) Respule 0.5milliGRAM(s) Nebulizer two times a day  doxazosin 8milliGRAM(s) Oral at bedtime  isosorbide   mononitrate ER Tablet (IMDUR) 120milliGRAM(s) Oral daily  gabapentin Oral Tab/Cap - Peds 600milliGRAM(s) Oral at bedtime  allopurinol 100milliGRAM(s) Oral daily  loratadine 10milliGRAM(s) Oral daily  fenofibrate Tablet 145milliGRAM(s) Oral daily  simvastatin 10milliGRAM(s) Oral at bedtime  pramipexole 0.125milliGRAM(s) Oral three times a day  metoprolol Oral Tab/Cap - Peds 12.5milliGRAM(s) Oral two times a day  hydrALAZINE 150milliGRAM(s) Oral two times a day  ammonium lactate 12% Lotion 1Application(s) Topical two times a day  diltiazem   CD 240milliGRAM(s) Oral daily  senna 2Tablet(s) Oral at bedtime  glycerin Suppository - Adult 1Suppository(s) Rectal daily  pantoprazole Infusion 8mG/Hr IV Continuous <Continuous>  fluticasone propionate 50 MICROgram(s)/spray Nasal Spray 1Spray(s) Both Nostrils two times a day      LABS: All Labs Reviewed:                        8.0    x     )-----------( x        ( 31 Mar 2017 03:29 )             24.0     31 Mar 2017 06:32    150    |  115    |  90     ----------------------------<  94     4.1     |  24     |  2.11     Ca    7.6        31 Mar 2017 06:32  Phos  2.9       31 Mar 2017 06:32    TPro  x      /  Alb  1.8    /  TBili  x      /  DBili  x      /  AST  x      /  ALT  x      /  AlkPhos  x      31 Mar 2017 06:32      IMAGING/RADIO/NUCLEAR:    Bleeding Scan 3/30/17:    "CLINICAL INFORMATION: 82 year old male with G.I. bleeding; referred to   localize bleeding site.     TECHNIQUE:  Dynamic images of the anterior abdomen/pelvis were obtained   for 2 hours following administration of labeled autologous red blood   cells. Static images of the abdomen/pelvis were obtained in the frontal   and lateralprojections immediately thereafter. An anterior image of the   head/neck was obtained for  purposes.    COMPARISON: None    FINDINGS: Focal accumulation of tracer in the distal stomach/proximal   duodenum which progresses distally though rthe small bowel.    IMPRESSION: Abnormal GI bleeding scan.    Active bleeding site in distal stomach/proximal proximal duodenum."        82 year old man w/ PMH of HTN, COPD on home O2 2L, SHAYY on nocturnal BIPAP, ex-smoker (smoked 1ppd X 50 years, quit 22 years ago),  Chronic diastolic CHF, CAD s/p PCI with stent in 2002, Last cath in july 2014 with RCA disease medically managed, Hyperlipidemia, PVD, Iron deficiency anemia,Chronic back pain, Gout, BPH, CKD III with baseline Cr 2.2,  PAF not on a/c, Hx of GI Bleed in the past 10/16 had sigmoidoscopy , hemorrhoids s/p banding, family hx of htn, dm, colon ca, now presenting with low hb. Patient states he went to his pcp and was found to have low hb and presented to ER. In Er he was found to have hb of 6.3.  Found to have bun/cr of 114/3.51.     1-Acute anemia ,  secondary to blood loss anemia  -Patients denies noticing any blood in stool or urine, as well has any hemetemsis however ongoing hb drop.  -His baseline hb is around 8.4 , admitted with 6.3  -s/p 11 units since admission  - EGD/colonscopy that revealed  non-bleeding ulcers (one with eschar) in stomach and colonic mass.   -hb fluctuating around 7.5 despite 11 units of blood by end of today  -His GI doc is Dr. Koch, consult appreciated  -No source of bleed; patient was sent for Bleeding scan yest  to determine site of bleed. Test result is above and is a positive test for stomach and duodenal bleed.  -ongoing dark stools. Dr. Koch will take patient for EGD /COlo this AM  -Bleeding scan today  - Haptoglobin is negative for hemolysis    2-CAD w/ recent stent placement  -cardio on board  -recco to take off aspirin temporarily till gi bleed resolves  -will re-evaluate when hb more stable      3-Chronic SHAYY and  copd  w/ chronic respiratory failure on home o2 and bipap nightly  -c/w night bipap and supplemental o2  -currently comfortable and not in exacerbation    4- Acute renal failure on stage III CKD possibly secondary to ATN  -baseline creatinine is 2.4 (2017)  -admited with 3.51 > 105/2.83 > 107/ 2.5  -trend: 2.7> 1.87 > 1.98>2.27 (today)  -likley secondary to ATN secondary to to volume loss  -nephrology services on board  -will continue to volume replenish and repeat am bmp  -hold lasix as per nephro or use intermittently    5- Acute back 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 Facet osteophytic hypertrophy is noted at C   L3-4 through L5-S1 with multiple posterior projecting synovial cyst   involving the LEFT L3-4, BILATERAL L4-5 and BILATERAL L5-S1 facet joints.  -physical therapy for discharge  -pain control    6-Paroxysmal atrial fibrillation.  Plan: currently in sinus rhythm on EKG   not on AC due to hx of GI bleed.     7-Chronic diastolic congestive heart failure.   -not in exacerbation    8-DVt proph- b/l SCDS only secondary to gi bleed    9-mild hypernatremia- possible secondary to mild dehydration  -promote oral fluid intake.   -lasix is being given symptomatically      10 - b/l  chronic venous stasis dermatitis and ulcer on the right lower extremity anterior surface  -PTA, wound care with Dr Cardenas    -"wound care nurse evaluation and consultationa pprecaited.   -wound care recs: patient has been undergoing whirlpool treatment to both lower extremities to remove debris and assist with odor control. 1) Daily Whirlpool treatments with 2 drops of hibiclens 2) After whirlpool apply Xeroform, kerlix and ace bandage. 3) Elevate extremities off mattress. 4) Turn and position every 2 hours  -investigate surgical shoes for feet (ordered via nursing). currently wearing cut crocs."  -Patient initally on my encounter on admit had a foul smell to the lower extremities b/l, which is now resolved.  -some persisent edema, which has slighlty increased. Will given 20ivp lasix today.       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         13-:Morbid Obesity  -bMI >35  -nutrition consult appreicated    14- acute decompensation secondary to prolonged hospitaliztion  -patient appears more lethargic and fatigued as hospitilization prolongs  -for nursing to keep him OOB at least BID  -phyiscal therapy involvment daily  -ambulation w/ pt and nursing when possible  -DC when stable to rehab. CHIEF COMPLAINT: anemia    SUBJECTIVE: no complaints    REVIEW OF SYSTEMS:    CONSTITUTIONAL: No weakness, fevers or chills  EYES/ENT: No visual changes;  No vertigo or throat pain   NECK: No pain or stiffness  RESPIRATORY: No cough, wheezing, hemoptysis; No shortness of breath  CARDIOVASCULAR: No chest pain or palpitations  GASTROINTESTINAL: No abdominal or epigastric pain. No nausea, vomiting, or hematemesis; No diarrhea or constipation. No melena or hematochezia.  GENITOURINARY: No dysuria, frequency or hematuria  NEUROLOGICAL: No numbness or weakness  SKIN: No itching, burning, rashes, or lesions   All other review of systems is negative unless indicated above    Vital Signs Last 24 Hrs  T(C): 36.8, Max: 37.3 (03-30 @ 23:30)  T(F): 98.2, Max: 99.2 (03-31 @ 02:50)  HR: 73 (53 - 104)  BP: 149/86 (131/35 - 150/32)  BP(mean): --  RR: 16 (16 - 18)  SpO2: 99% (94% - 99%)    I&O's Summary    I & Os for current day (as of 31 Mar 2017 08:27)  =============================================  IN: 420.1 ml / OUT: 1500 ml / NET: -1079.9 ml      CAPILLARY BLOOD GLUCOSE      PHYSICAL EXAM:    Constitutional: NAD, awake and alert, well-developed  HEENT: PERR, EOMI, Normal Hearing, MMM  Neck: Soft and supple, No LAD, No JVD  Respiratory: Breath sounds are clear bilaterally, No wheezing, rales or rhonchi  Cardiovascular: S1 and S2, regular rate and rhythm, no Murmurs, gallops or rubs  Gastrointestinal: Bowel Sounds present, soft, nontender, nondistended, no guarding, no rebound  Extremities: No peripheral edema  Vascular: 2+ peripheral pulses  Neurological: A/O x 3, no focal deficits  Musculoskeletal: 5/5 strength b/l upper and lower extremities  Skin: No rashes    MEDICATIONS:  MEDICATIONS  (STANDING):  finasteride 5milliGRAM(s) Oral daily  buDESOnide   0.5 milliGRAM(s) Respule 0.5milliGRAM(s) Nebulizer two times a day  doxazosin 8milliGRAM(s) Oral at bedtime  isosorbide   mononitrate ER Tablet (IMDUR) 120milliGRAM(s) Oral daily  gabapentin Oral Tab/Cap - Peds 600milliGRAM(s) Oral at bedtime  allopurinol 100milliGRAM(s) Oral daily  loratadine 10milliGRAM(s) Oral daily  fenofibrate Tablet 145milliGRAM(s) Oral daily  simvastatin 10milliGRAM(s) Oral at bedtime  pramipexole 0.125milliGRAM(s) Oral three times a day  metoprolol Oral Tab/Cap - Peds 12.5milliGRAM(s) Oral two times a day  hydrALAZINE 150milliGRAM(s) Oral two times a day  ammonium lactate 12% Lotion 1Application(s) Topical two times a day  diltiazem   CD 240milliGRAM(s) Oral daily  senna 2Tablet(s) Oral at bedtime  glycerin Suppository - Adult 1Suppository(s) Rectal daily  pantoprazole Infusion 8mG/Hr IV Continuous <Continuous>  fluticasone propionate 50 MICROgram(s)/spray Nasal Spray 1Spray(s) Both Nostrils two times a day      LABS: All Labs Reviewed:                        8.0    x     )-----------( x        ( 31 Mar 2017 03:29 )             24.0     31 Mar 2017 06:32    150    |  115    |  90     ----------------------------<  94     4.1     |  24     |  2.11     Ca    7.6        31 Mar 2017 06:32  Phos  2.9       31 Mar 2017 06:32    TPro  x      /  Alb  1.8    /  TBili  x      /  DBili  x      /  AST  x      /  ALT  x      /  AlkPhos  x      31 Mar 2017 06:32      IMAGING/RADIO/NUCLEAR:    Bleeding Scan 3/30/17:    "CLINICAL INFORMATION: 82 year old male with G.I. bleeding; referred to   localize bleeding site.     TECHNIQUE:  Dynamic images of the anterior abdomen/pelvis were obtained   for 2 hours following administration of labeled autologous red blood   cells. Static images of the abdomen/pelvis were obtained in the frontal   and lateralprojections immediately thereafter. An anterior image of the   head/neck was obtained for  purposes.    COMPARISON: None    FINDINGS: Focal accumulation of tracer in the distal stomach/proximal   duodenum which progresses distally though rthe small bowel.    IMPRESSION: Abnormal GI bleeding scan.    Active bleeding site in distal stomach/proximal proximal duodenum."        82 year old man w/ PMH of HTN, COPD on home O2 2L, SHAYY on nocturnal BIPAP, ex-smoker (smoked 1ppd X 50 years, quit 22 years ago),  Chronic diastolic CHF, CAD s/p PCI with stent in 2002, Last cath in july 2014 with RCA disease medically managed, Hyperlipidemia, PVD, Iron deficiency anemia,Chronic back pain, Gout, BPH, CKD III with baseline Cr 2.2,  PAF not on a/c, Hx of GI Bleed in the past 10/16 had sigmoidoscopy , hemorrhoids s/p banding, family hx of htn, dm, colon ca, now presenting with low hb. Patient states he went to his pcp and was found to have low hb and presented to ER. In Er he was found to have hb of 6.3.  Found to have bun/cr of 114/3.51.     1-Acute anemia ,  secondary to blood loss anemia  -Patients denies noticing any blood in stool or urine, as well has any hemetemsis however ongoing hb drop.  -His baseline hb is around 8.4 , admitted with 6.3  -s/p 11 units since admission  - EGD/colonscopy that revealed  non-bleeding ulcers (one with eschar) in stomach and colonic mass.   -hb fluctuating around 7.5 despite 11 units of blood by end of today  -His GI doc is Dr. Koch, consult appreciated  -No source of bleed; patient was sent for Bleeding scan 3/30to determine site of bleed. Test result is above and is a positive test for stomach and duodenal bleed.(results above)  -s/p EGD  this am. Found to have difloid lesion in the distal duodenum which has been cauterized.  -c/w clear liquid diet and protonix drip today  -advance diet [place] and d/c protonix drip  -repeating h+h q12h for now    2-CAD w/ recent stent placement  -cardio on board  -recco to take off aspirin temporarily till gi bleed resolves  -for asp/plavix to be re-started when h+h stable      3-Chronic SHAYY and  copd  w/ chronic respiratory failure on home o2 and bipap nightly  -c/w night bipap and supplemental o2  -currently comfortable and not in exacerbation    4- Acute renal failure on stage III CKD possibly secondary to ATN  -baseline creatinine is 2.4 (2017)  -admited with 3.51 > 105/2.83 > 107/ 2.5  -trend: 2.7> 1.87 > 1.98>2.27 (today)  -likley secondary to ATN secondary to to volume loss  -nephrology services on board  -will continue to volume replenish and repeat am bmp  -hold lasix as per nephro or use intermittently    5- Acute back 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 Facet osteophytic hypertrophy is noted at C   L3-4 through L5-S1 with multiple posterior projecting synovial cyst   involving the LEFT L3-4, BILATERAL L4-5 and BILATERAL L5-S1 facet joints.  -physical therapy for discharge  -pain control    6-Paroxysmal atrial fibrillation.  Plan: currently in sinus rhythm on EKG   not on AC due to hx of GI bleed.     7-Chronic diastolic congestive heart failure.   -not in exacerbation    8-DVt proph- b/l SCDS only secondary to gi bleed    9-mild hypernatremia- possible secondary to mild dehydration  -promote oral fluid intake.   -lasix is being given symptomatically      10 - b/l  chronic venous stasis dermatitis and ulcer on the right lower extremity anterior surface  -PTA, wound care with Dr Cardenas    -"wound care nurse evaluation and consultationa pprecaited.   -wound care recs: patient has been undergoing whirlpool treatment to both lower extremities to remove debris and assist with odor control. 1) Daily Whirlpool treatments with 2 drops of hibiclens 2) After whirlpool apply Xeroform, kerlix and ace bandage. 3) Elevate extremities off mattress. 4) Turn and position every 2 hours  -investigate surgical shoes for feet (ordered via nursing). currently wearing cut crocs."  -Patient initally on my encounter on admit had a foul smell to the lower extremities b/l, which is now resolved.  -some persisent edema, which has slighlty increased. Will given 20ivp lasix today.       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         13-:Morbid Obesity  -bMI >35  -nutrition consult appreicated    14- acute decompensation secondary to prolonged hospitaliztion  -patient appears more lethargic and fatigued as hospitilization prolongs  -for nursing to keep him OOB at least BID  -phyiscal therapy involvment daily  -ambulation w/ pt and nursing when possible  -DC when stable to rehab.

## 2017-03-31 NOTE — DIETITIAN INITIAL EVALUATION ADULT. - ORAL INTAKE PTA
poor/Pt PO intake has been poor during hospital stay. Pt on and off clear liquid diet over the past 10 days. Pt typically a good eater at home. Pt reports enjoying Fruits and vegetables, and seafood.

## 2017-03-31 NOTE — DIETITIAN INITIAL EVALUATION ADULT. - OTHER INFO
Pt seen for length of stay. Pt with GI bleed, Low Hgb and Hct, multiple transfusions. Pt reports being a good eater at home and prior to admission had great intake.

## 2017-03-31 NOTE — DIETITIAN INITIAL EVALUATION ADULT. - ADHERENCE
fair/Pt reports large intake of fruits and vegetables, pt consumes some high sodium foods like sardines.

## 2017-03-31 NOTE — PROGRESS NOTE ADULT - SUBJECTIVE AND OBJECTIVE BOX
Patient is a 82y old  Male who presents with a chief complaint of left leg pain radiating from left buttoch and thigh (21 Mar 2017 06:35)      HPI:  HPI: :: 80 y/o male with PMHx of COPD, CHF, chronic renal failure, paroxysmal A-fib.with last hospitaliztion 10/2017 for hematochezia s/p bleeding scan and flex sigmoidoscopy  was BIBA for acute worsening lower back pain radiating to buttock to foot   patient with amber boots for chronic venous stasis ulcers and lower extremity chronic edema ,daughter reports increased oozing from ulcers in lai past few weeks,no fever  In Ed patient noted to have Hb 6 and stool guaiac positive  and rectal exam- with cookie  done by ED physician as per note   Patient denies any chest pain or worsening of SOB ,no abdominal pain or vomiting ,no diarrhea,no efver or chills  seen and evaluated along with Dr SANTIZO  His dtr is contacted as well    all his recent data and PFT data obtained and discussed with his cardiologist today 3/24  pt is scheduled for his GI workup today    3/27  pt is seen and examined and feels better  asks for food  no further transfusions required  no distress  tolerated procedure well  no co  no sob  3/28  asks about going home  some nasal congestion  no co  no difficulty bretahing  uses bipap at night  3/29  feels well this am  smiling  no dyspnea  nasal congestion improved with nasal sparys use  3/30  no acute distress  getting cleaned this am while in bed  bilateral leg ulcers noted  3/31  going to repeat endoscopy this am after bleeding scan noted  dtrs at bedside  no resp issues reported at all  feels ok  no cp or sob at all  PMHx: ::  HTN,  COPD on home O2 2L,  SHAYY on nocturnal BIPAP,  Chronic diastolic CHF,  CAD s/p PCI with stent in 2002,  Last cath in july 2014 with RCA disease medically managed, Hyperlipidemia,  PVD,  Iron deficiency anemia,  Chronic back pain,  Gout,  BPH,  CKD III with baseline Cr 2.2,  PAF not on a/c,  Hx of GI Bleed in the past, hemorrhoids s/p banding,  PSHx: ::  Right rotator cuff repair  pilonidal cyst  Family History: ::  Father: rectal CA  Mother: HTN, DM  3 siblings: DM  Social History: ::  Tobacco: smoked 1ppd X 50 years, quit 22 years ago.  Rare ETOH use.  wife recently passed away last year (21 Mar 2017 06:35)      PAST MEDICAL & SURGICAL HISTORY:  Sepsis, due to unspecified organism: 2/2 poorly healing wounds b/l  BPH (benign prostatic hypertrophy)  Hyperlipemia  Coronary artery disease  Hypertension  Dyspepsia: On moderate exertion.  Sleep apnea, obstructive: Requires home 02 therapy, and treatment with BIPAP  Atelectasis  Pleural effusion, bilateral  Respiratory failure  Peripheral edema  CRI (chronic renal insufficiency)  Gout  CRF (chronic renal failure)  Benign prostatic hypertrophy  Spinal stenosis  Hypercholesterolemia  GERD (gastroesophageal reflux disease)  CAD (coronary artery disease)  Hypertension  S/P angioplasty with stent  Cataract of left eye  Prostate: Surgery green light procedure.  S/P rotator cuff surgery: Right  S/P angioplasty  Rotator cuff tear, right: repair      PREVIOUS DIAGNOSTIC TESTING:    MEDICATIONS  (STANDING):  finasteride 5milliGRAM(s) Oral daily  buDESOnide   0.5 milliGRAM(s) Respule 0.5milliGRAM(s) Nebulizer two times a day  doxazosin 8milliGRAM(s) Oral at bedtime  isosorbide   mononitrate ER Tablet (IMDUR) 120milliGRAM(s) Oral daily  gabapentin Oral Tab/Cap - Peds 600milliGRAM(s) Oral at bedtime  allopurinol 100milliGRAM(s) Oral daily  loratadine 10milliGRAM(s) Oral daily  fenofibrate Tablet 145milliGRAM(s) Oral daily  simvastatin 10milliGRAM(s) Oral at bedtime  pramipexole 0.125milliGRAM(s) Oral three times a day  metoprolol Oral Tab/Cap - Peds 12.5milliGRAM(s) Oral two times a day  hydrALAZINE 150milliGRAM(s) Oral two times a day  ammonium lactate 12% Lotion 1Application(s) Topical two times a day  diltiazem   CD 240milliGRAM(s) Oral daily  senna 2Tablet(s) Oral at bedtime  glycerin Suppository - Adult 1Suppository(s) Rectal daily  acetaminophen   Tablet 650milliGRAM(s) Oral every 6 hours  pantoprazole Infusion 8mG/Hr IV Continuous <Continuous>  sucralfate 1Gram(s) Oral four times a day    MEDICATIONS  (PRN):  nitroglycerin     SubLingual 0.4milliGRAM(s) SubLingual every 5 minutes PRN Chest Pain  ondansetron Injectable 4milliGRAM(s) IV Push every 6 hours PRN Nausea and/or Vomiting  diphenhydrAMINE   Elixir 12.5milliGRAM(s) Oral every 6 hours PRN Rash and/or Itching        FAMILY HISTORY:  No pertinent family history in first degree relatives          REVIEW OF SYSTEM:  Pertinent items are noted in HPI.  Constitutional negative for chills, fevers, sweats and weight loss  throat, and face:  negative for epistaxis, nasal congestion, sore throat and   tinnitus  Respiratory: negative for cough,pos  dyspnea on exertion,no pleuritic chest pain  and wheezing  Cardiovascular:  negative for chest pain, dyspnea and palpitations  Gastrointestinal: negative for abdominal pain, diarrhea, nausea and vomiting  Genitourinary: negative for dysuria, frequency and urinary incontinence  Skin:  negative for redness, rash, pruritus, swelling, dryness and   fissures  Hematologic/lymphatic: negative for bleeding and easy bruising  Musculoskeletal: negative for arthralgias, back pain and muscle weakness  Neurological: negative for dizziness, headaches, seizures and tremors  Behavioral/Psych:  negative for mood change, depression, suicidal attempts    Allergic/Immunologic: negative for anaphylaxis, angioedema and urticaria    Vital Signs Last 24 Hrs  T(C): 36.8, Max: 37.3 (03-30 @ 23:30)  T(F): 98.2, Max: 99.2 (03-31 @ 02:50)  HR: 73 (53 - 104)  BP: 149/86 (131/35 - 150/32)  BP(mean): --  RR: 16 (16 - 18)  SpO2: 99% (94% - 99%)  PHYSICAL EXAM  General Appearance: cooperative, no acute distress,   HEENT: PERRL, conjunctiva clear, EOM's intact, non injected pharynx, no exudate, TM   normal  Neck: Supple, , no adenopathy, thyroid: not enlarged, no carotid bruit or JVD  Back: Symmetric, no  tenderness,no soft tissue tenderness  Lungs: Clear to auscultation bilateral,no adventitious breath sounds, normal   expiratory phase  Heart: Regular rate and rhythm, S1, S2 normal, no murmur, rub or gallop  Abdomen: Soft, non-tender, bowel sounds active , no hepatosplenomegaly  Extremities: no cyanosis or edema, no joint swelling  Skin: Skin color, texture normal, no rashes   Neurologic: Alert and oriented X3 , cranial nerves intact, sensory and motor normal,    ECG:    LABS:                        8.0    x     )-----------( x        ( 31 Mar 2017 03:29 )             24.0                             7.0    7.5   )-----------( 271      ( 30 Mar 2017 05:24 )             21.9                           7.1    6.9   )-----------( 264      ( 29 Mar 2017 05:51 )             22.0                        28 Mar 2017 06:03    149    |  117    |  82     ----------------------------<  125    4.3     |  24     |  1.98     Ca    7.8        28 Mar 2017 06:03                    7.7    8.8   )-----------( 239      ( 27 Mar 2017 05:49 )             24.0       27 Mar 2017 05:49    151    |  117    |  77     ----------------------------<  110    4.1     |  23     |  1.87     Ca    8.0        27 Mar 2017 05:49                 8.2    x     )-----------( x        ( 23 Mar 2017 07:54 )             25.4     23 Mar 2017 05:00    151    |  118    |  107    ----------------------------<  113    4.5     |  23     |  2.50     Ca    7.8        23 Mar 2017 05:00    TPro  5.7    /  Alb  2.4    /  TBili  0.5    /  DBili  x      /  AST  23     /  ALT  11     /  AlkPhos  35     21 Mar 2017 09:55    CARDIAC MARKERS ( 21 Mar 2017 17:23 )  0.028 ng/mL / x     / 196 U/L / x     / x      CARDIAC MARKERS ( 21 Mar 2017 09:55 )  0.023 ng/mL / x     / 172 U/L / x     / x            Pro BNP  1025 03-21 @ 09:55  D Dimer  -- 03-21 @ 09:55    PT/INR - ( 21 Mar 2017 09:55 )   PT: 11.9 sec;   INR: 1.08 ratio    cxr noted  PROCEDURE DATE:  03/29/2017        INTERPRETATION:  Single view chest    History CHF    Comparison 6 days ago    There is interval development of mild left retrocardiac volume loss   without layering effusion or licha central edema. The heart is normal in   size for projection.    IMPRESSION:    Retrocardiac opacity consistent with atelectasis or pneumonia     PTT - ( 21 Mar 2017 09:55 )  PTT:35.5 sec  INTERPRETATION:  Exam Date: 3/21/2017 7:33 AM    History: Back pain    Technique: Single frontal portable view of the chest with comparison to    10/20/2016    Findings:    Studies limited by rotation.    The heart is enlarged. No focal consolidation. The apices and   hemidiaphragms are unremarkable. Degenerative changes of the visualized   osseous structures.        Impression:EXAM:  CHEST SINGLE VIEW FRONTAL                            PROCEDURE DATE:  03/23/2017        INTERPRETATION:  Views:1  Comparison: 2 days ago  History: CHF    The lungs are clear of infiltrates and effusions.     The cardiac and   mediastinal contours appear unremarkable.     Impression:  1. No evidence of acute pulmonary disease.      Cardiomegaly          ABG - ( 23 Mar 2017 11:07 )  pH: 7.40  /  pCO2: 35    /  pO2: 109   / HCO3: 21    / Base Excess: -3    /  SaO2: 99            28 Mar 2017 06:03    149    |  117    |  82     ----------------------------<  125    4.3     |  24     |  1.98     Ca    7.8        28 Mar 2017 06:03                          8.0    x     )-----------( x        ( 31 Mar 2017 03:29 )             24.0       RADIOLOGY & ADDITIONAL STUDIES:

## 2017-03-31 NOTE — PROGRESS NOTE ADULT - ASSESSMENT
80 y/o male with PMHx of COPD, CHF, chronic renal failure, paroxysmal A-fib.with last hospitaliztion 10/2017 for hematochezia s/p bleeding scan and flex sigmoidoscopy  was BIBA  now being worked up for GI bleed and has required multiple 4-5 units PRBCs done  now needs eval for upper and likley lower endoscopy  Chronic hypoxemic resp failure  CHF appears stable  P Afib now in nsr  hemodynamically stabe  SHAYY / OHS /COPD-overlap syndrome  suggest ABG and repeat cxr today to complete her workup  I've explained to pt and his DTR, critical care RN that pt is at incresaed risk of resp failure requiring mechanical ventilation with sedation, but his pulm status appears optimized presently for this indicated procedure with recent requirement to get muliple prbcs over last few days  recommend  ABG noted  cxr is clear  cont nocturnal BIPAP  Anesthesia veal before his procedure  cardiac eval in progress  will check outpt pfts, placed in chart  ABG noted  FEV1 6/2016 1.38, 68% predicted  DLCO 45% predicted  supportive care  BIPAP 12/8 with o2 2 liters attached  plan as per GI  cont current rx  supportive care required  Add flonase nasal spray   all recent data discussed with pt today 3/28  case discussed with Dr Hernández today  GI eval in progress for anemia  check repeat cxr today  may need additional endoscopy for anemia  hemodynamically stable  retrocardiac opacity likley atelectasis but ceratinly at increased risk of pneumonia  encourage incentive spirometry  monitor wbc and temps  duoneb with nebulizer rx  fu cxr today  pulm status appears optimized for repeat endoscopy indicated based on repeat bleeding scan results, family and pt are aware of all results this am.

## 2017-04-01 LAB
HCT VFR BLD CALC: 26 % — LOW (ref 39–50)
HCT VFR BLD CALC: 27.6 % — LOW (ref 39–50)
HCT VFR BLD CALC: 27.6 % — LOW (ref 39–50)
HGB BLD-MCNC: 8.8 G/DL — LOW (ref 13–17)
HGB BLD-MCNC: 8.9 G/DL — LOW (ref 13–17)
HGB BLD-MCNC: 9 G/DL — LOW (ref 13–17)
MCHC RBC-ENTMCNC: 29.8 PG — SIGNIFICANT CHANGE UP (ref 27–34)
MCHC RBC-ENTMCNC: 32.8 GM/DL — SIGNIFICANT CHANGE UP (ref 32–36)
MCV RBC AUTO: 91 FL — SIGNIFICANT CHANGE UP (ref 80–100)
PLATELET # BLD AUTO: 311 K/UL — SIGNIFICANT CHANGE UP (ref 150–400)
RBC # BLD: 2.95 M/UL — LOW (ref 4.2–5.8)
RBC # BLD: 3.03 M/UL — LOW (ref 4.2–5.8)
RBC # FLD: 15.5 % — HIGH (ref 10.3–14.5)
RETICS #: 81.7 K/UL — SIGNIFICANT CHANGE UP (ref 25–125)
RETICS/RBC NFR: 2.8 % — HIGH (ref 0.5–2.5)
WBC # BLD: 7.6 K/UL — SIGNIFICANT CHANGE UP (ref 3.8–10.5)
WBC # FLD AUTO: 7.6 K/UL — SIGNIFICANT CHANGE UP (ref 3.8–10.5)

## 2017-04-01 RX ORDER — FUROSEMIDE 40 MG
40 TABLET ORAL
Qty: 0 | Refills: 0 | Status: DISCONTINUED | OUTPATIENT
Start: 2017-04-01 | End: 2017-04-03

## 2017-04-01 RX ORDER — FERROUS SULFATE 325(65) MG
325 TABLET ORAL DAILY
Qty: 0 | Refills: 0 | Status: DISCONTINUED | OUTPATIENT
Start: 2017-04-01 | End: 2017-04-03

## 2017-04-01 RX ADMIN — Medication 0.5 MILLIGRAM(S): at 20:03

## 2017-04-01 RX ADMIN — Medication 12.5 MILLIGRAM(S): at 18:06

## 2017-04-01 RX ADMIN — LORATADINE 10 MILLIGRAM(S): 10 TABLET ORAL at 12:55

## 2017-04-01 RX ADMIN — SIMVASTATIN 10 MILLIGRAM(S): 20 TABLET, FILM COATED ORAL at 22:03

## 2017-04-01 RX ADMIN — Medication 650 MILLIGRAM(S): at 15:47

## 2017-04-01 RX ADMIN — Medication 150 MILLIGRAM(S): at 05:17

## 2017-04-01 RX ADMIN — FINASTERIDE 5 MILLIGRAM(S): 5 TABLET, FILM COATED ORAL at 12:56

## 2017-04-01 RX ADMIN — Medication 1 APPLICATION(S): at 05:23

## 2017-04-01 RX ADMIN — PRAMIPEXOLE DIHYDROCHLORIDE 0.12 MILLIGRAM(S): 0.12 TABLET ORAL at 05:17

## 2017-04-01 RX ADMIN — Medication 100 MILLIGRAM(S): at 12:54

## 2017-04-01 RX ADMIN — Medication 145 MILLIGRAM(S): at 12:55

## 2017-04-01 RX ADMIN — Medication 12.5 MILLIGRAM(S): at 05:21

## 2017-04-01 RX ADMIN — Medication 150 MILLIGRAM(S): at 18:05

## 2017-04-01 RX ADMIN — Medication 40 MILLIGRAM(S): at 18:07

## 2017-04-01 RX ADMIN — Medication 240 MILLIGRAM(S): at 05:18

## 2017-04-01 RX ADMIN — PANTOPRAZOLE SODIUM 10 MG/HR: 20 TABLET, DELAYED RELEASE ORAL at 02:56

## 2017-04-01 RX ADMIN — Medication 0.5 MILLIGRAM(S): at 08:48

## 2017-04-01 RX ADMIN — Medication 1 SPRAY(S): at 18:07

## 2017-04-01 RX ADMIN — PRAMIPEXOLE DIHYDROCHLORIDE 0.12 MILLIGRAM(S): 0.12 TABLET ORAL at 15:47

## 2017-04-01 RX ADMIN — Medication 325 MILLIGRAM(S): at 13:03

## 2017-04-01 RX ADMIN — GABAPENTIN 600 MILLIGRAM(S): 400 CAPSULE ORAL at 22:03

## 2017-04-01 RX ADMIN — PRAMIPEXOLE DIHYDROCHLORIDE 0.12 MILLIGRAM(S): 0.12 TABLET ORAL at 22:03

## 2017-04-01 RX ADMIN — Medication 1 SPRAY(S): at 05:24

## 2017-04-01 RX ADMIN — ISOSORBIDE MONONITRATE 120 MILLIGRAM(S): 60 TABLET, EXTENDED RELEASE ORAL at 12:54

## 2017-04-01 RX ADMIN — Medication 20 MILLIGRAM(S): at 05:18

## 2017-04-01 RX ADMIN — Medication 8 MILLIGRAM(S): at 22:03

## 2017-04-01 NOTE — PROGRESS NOTE ADULT - SUBJECTIVE AND OBJECTIVE BOX
NEPHROLOGY INTERVAL HPI/OVERNIGHT EVENTS:    HPI:  HPI: :: 82 y/o male with PMHx of COPD, CHF, chronic renal failure, paroxysmal A-fib. with last hospitalization 10/2017 for hematochezia s/p bleeding scan and flex sigmoidoscopy  was BIBA for acute worsening lower back pain radiating to buttock to foot   patient with amber boots for chronic venous stasis ulcers and lower extremity chronic edema ,daughter reports increased oozing from ulcers in the past few weeks, no fever  In Ed patient noted to have Hb 6 and stool guaiac positive  and rectal exam- with melena  done by ED physician as per note   Patient denies any chest pain or worsening of SOB ,no abdominal pain or vomiting ,no diarrhea, no fever or chills    4/  feels well c/o bilateral wrist tenderness  got warm compresses   no sob, states legs are better edema improving  not on lasix  possible dc on monday to rehab    PMHx: ::  HTN,  COPD on home O2 2L,  SHAYY on nocturnal BIPAP,  Chronic diastolic CHF,  CAD s/p PCI with stent in ,  Last cath in 2014 with RCA disease medically managed, Hyperlipidemia,  PVD,  Iron deficiency anemia,  Chronic back pain,  Gout,  BPH,  CKD III with baseline Cr 2.2,  PAF not on a/c,  Hx of GI Bleed in the past, hemorrhoids s/p banding,  PSHx: ::  Right rotator cuff repair  pilonidal cyst  Family History: ::  Father: rectal CA  Mother: HTN, DM  3 siblings: DM  Social History: ::  Tobacco: smoked 1ppd X 50 years, quit 22 years ago.  Rare ETOH use.  wife recently passed away last year (21 Mar 2017 06:35)      PAST MEDICAL & SURGICAL HISTORY:  Sepsis, due to unspecified organism: 2/2 poorly healing wounds b/l  BPH (benign prostatic hypertrophy)  Hyperlipemia  Coronary artery disease  Hypertension  Dyspepsia: On moderate exertion.  Sleep apnea, obstructive: Requires home 02 therapy, and treatment with BIPAP  Atelectasis  Pleural effusion, bilateral  Respiratory failure  Peripheral edema  CRI (chronic renal insufficiency)  Gout  CRF (chronic renal failure)  Benign prostatic hypertrophy  Spinal stenosis  Hypercholesterolemia  GERD (gastroesophageal reflux disease)  CAD (coronary artery disease)  Hypertension  S/P angioplasty with stent  Cataract of left eye  Prostate: Surgery green light procedure.  S/P rotator cuff surgery: Right  S/P angioplasty  Rotator cuff tear, right: repair      FAMILY HISTORY:  No pertinent family history in first degree relatives      MEDICATIONS  (STANDING):  finasteride 5milliGRAM(s) Oral daily  buDESOnide   0.5 milliGRAM(s) Respule 0.5milliGRAM(s) Nebulizer two times a day  doxazosin 8milliGRAM(s) Oral at bedtime  isosorbide   mononitrate ER Tablet (IMDUR) 120milliGRAM(s) Oral daily  gabapentin Oral Tab/Cap - Peds 600milliGRAM(s) Oral at bedtime  allopurinol 100milliGRAM(s) Oral daily  loratadine 10milliGRAM(s) Oral daily  fenofibrate Tablet 145milliGRAM(s) Oral daily  simvastatin 10milliGRAM(s) Oral at bedtime  pramipexole 0.125milliGRAM(s) Oral three times a day  metoprolol Oral Tab/Cap - Peds 12.5milliGRAM(s) Oral two times a day  hydrALAZINE 150milliGRAM(s) Oral two times a day  ammonium lactate 12% Lotion 1Application(s) Topical two times a day  diltiazem   CD 240milliGRAM(s) Oral daily  senna 2Tablet(s) Oral at bedtime  glycerin Suppository - Adult 1Suppository(s) Rectal daily  pantoprazole Infusion 8mG/Hr IV Continuous <Continuous>  fluticasone propionate 50 MICROgram(s)/spray Nasal Spray 1Spray(s) Both Nostrils two times a day    MEDICATIONS  (PRN):  nitroglycerin     SubLingual 0.4milliGRAM(s) SubLingual every 5 minutes PRN Chest Pain  ondansetron Injectable 4milliGRAM(s) IV Push every 6 hours PRN Nausea and/or Vomiting  diphenhydrAMINE   Elixir 12.5milliGRAM(s) Oral every 6 hours PRN Rash and/or Itching  oxyCODONE IR 5milliGRAM(s) Oral two times a day PRN Severe Pain (7 - 10)  acetaminophen   Tablet 650milliGRAM(s) Oral every 6 hours PRN For Temp greater than 38 C (100.4 F)      Allergies    No Known Allergies    Intolerances        I&O's Summary    I & Os for current day (as of 2017 11:29)  =============================================  IN: 424 ml / OUT: 800 ml / NET: -376 ml        REVIEW OF SYSTEMS:    CONSTITUTIONAL:  As per HPI.    HEENT:  Eyes:  No diplopia or blurred vision. ENT:  No earache, sore throat or runny nose.    CARDIOVASCULAR:  No pressure, squeezing, strangling, tightness, heaviness or aching about the chest, neck, axilla or epigastrium.    RESPIRATORY:  No cough, shortness of breath, PND or orthopnea.    GASTROINTESTINAL:  No nausea, vomiting or diarrhea.    GENITOURINARY:  No dysuria, frequency or urgency.    MUSCULOSKELETAL:  As per HPI.    SKIN:  skin excoriation legs wrapped    NEUROLOGIC:  No paresthesias, fasciculations, seizures or weakness.    PSYCHIATRIC:  No disorder of thought or mood.    ENDOCRINE:  No heat or cold intolerance, polyuria or polydipsia.    HEMATOLOGICAL:  No easy bruising or bleeding.          Vital Signs Last 24 Hrs  T(C): 36.5, Max: 37.2 ( @ 17:22)  T(F): 97.7, Max: 98.9 ( @ 17:22)  HR: 78 (75 - 105)  BP: 146/38 (146/38 - 166/39)  BP(mean): --  RR: 17 (17 - 18)  SpO2: 95% (94% - 99%)  Daily     Daily Weight in k.7 (2017 10:13)    PHYSICAL EXAM:    General:  Alert, well-developed ,No acute distress.    Neuro:  Alert and oriented to person, place, and time. Able to communicate  well.  Appropriate affect.     HEENT:  No JVD, no masses, Eyes anicteric, No carotid bruits.No lymphadenopathy,    Cardiovascular:  Regular rate and rhythm, with normal S1 and S2. No murmurs, rubs,  or gallops. No JVD.    Lungs:  Lungs clear. no rales, no wheezing, .    Abdomen:  Normoactive bowel sounds. Soft, flat, non-tender, and non-distended.  No hepatosplenomegaly, positive bowel sounds    Skin:  leg excoriations, weeping improved     Extremities:  leg excoriations, weeping improved   LABS:                        8.9    x     )-----------( x        ( 2017 05:56 )             27.6     31 Mar 2017 06:32    150    |  115    |  90     ----------------------------<  94     4.1     |  24     |  2.11     Ca    7.6        31 Mar 2017 06:32  Phos  2.9       31 Mar 2017 06:32    TPro  x      /  Alb  1.8    /  TBili  x      /  DBili  x      /  AST  x      /  ALT  x      /  AlkPhos  x      31 Mar 2017 06:32

## 2017-04-01 NOTE — PROVIDER CONTACT NOTE (OTHER) - SITUATION
As per Rosa Garcia called in the consult.
Dr. Sen
Spoke with Bambi
Spoke with Clau
DR. BOYCE ([facility]) NOTIFIED OF CONSULT
DR. BUCIO (CARDIO) NOTIFIED OF CONSULT
DR. CARLOS (GASTRO) NOTIFIED OF CONSULT
DR. MCBRIDE ([facility]) NOTIFIED OF CONSULT
DR. PERRY (NEPHROL) NOTIFIED OF CONSULT

## 2017-04-01 NOTE — PROGRESS NOTE ADULT - SUBJECTIVE AND OBJECTIVE BOX
Patient is a 82y old  Male who presents with a chief complaint of left leg pain radiating from left buttoch and thigh (21 Mar 2017 06:35)      HPI:  HPI: :: 82 y/o male with PMHx of COPD, CHF, chronic renal failure, paroxysmal A-fib.with last hospitaliztion 10/2017 for hematochezia s/p bleeding scan and flex sigmoidoscopy  was BIBA for acute worsening lower back pain radiating to buttock to foot   patient with amber boots for chronic venous stasis ulcers and lower extremity chronic edema ,daughter reports increased oozing from ulcers in lai past few weeks,no fever  In Ed patient noted to have Hb 6 and stool guaiac positive  and rectal exam- with cookie  done by ED physician as per note   Patient denies any chest pain or worsening of SOB ,no abdominal pain or vomiting ,no diarrhea,no efver or chills  No bleeding overnight. Hct is stable.    PMHx: ::  HTN,  COPD on home O2 2L,  SHAYY on nocturnal BIPAP,  Chronic diastolic CHF,  CAD s/p PCI with stent in 2002,  Last cath in july 2014 with RCA disease medically managed, Hyperlipidemia,  PVD,  Iron deficiency anemia,  Chronic back pain,  Gout,  BPH,  CKD III with baseline Cr 2.2,  PAF not on a/c,  Hx of GI Bleed in the past, hemorrhoids s/p banding,  PSHx: ::  Right rotator cuff repair  pilonidal cyst  Family History: ::  Father: rectal CA  Mother: HTN, DM  3 siblings: DM  Social History: ::  Tobacco: smoked 1ppd X 50 years, quit 22 years ago.  Rare ETOH use.  wife recently passed away last year (21 Mar 2017 06:35)      PAST MEDICAL & SURGICAL HISTORY:  Sepsis, due to unspecified organism: 2/2 poorly healing wounds b/l  BPH (benign prostatic hypertrophy)  Hyperlipemia  Coronary artery disease  Hypertension  Dyspepsia: On moderate exertion.  Sleep apnea, obstructive: Requires home 02 therapy, and treatment with BIPAP  Atelectasis  Pleural effusion, bilateral  Respiratory failure  Peripheral edema  CRI (chronic renal insufficiency)  Gout  CRF (chronic renal failure)  Benign prostatic hypertrophy  Spinal stenosis  Hypercholesterolemia  GERD (gastroesophageal reflux disease)  CAD (coronary artery disease)  Hypertension  S/P angioplasty with stent  Cataract of left eye  Prostate: Surgery green light procedure.  S/P rotator cuff surgery: Right  S/P angioplasty  Rotator cuff tear, right: repair      MEDICATIONS  (STANDING):  finasteride 5milliGRAM(s) Oral daily  buDESOnide   0.5 milliGRAM(s) Respule 0.5milliGRAM(s) Nebulizer two times a day  doxazosin 8milliGRAM(s) Oral at bedtime  isosorbide   mononitrate ER Tablet (IMDUR) 120milliGRAM(s) Oral daily  gabapentin Oral Tab/Cap - Peds 600milliGRAM(s) Oral at bedtime  allopurinol 100milliGRAM(s) Oral daily  loratadine 10milliGRAM(s) Oral daily  fenofibrate Tablet 145milliGRAM(s) Oral daily  simvastatin 10milliGRAM(s) Oral at bedtime  pramipexole 0.125milliGRAM(s) Oral three times a day  metoprolol Oral Tab/Cap - Peds 12.5milliGRAM(s) Oral two times a day  hydrALAZINE 150milliGRAM(s) Oral two times a day  ammonium lactate 12% Lotion 1Application(s) Topical two times a day  diltiazem   CD 240milliGRAM(s) Oral daily  senna 2Tablet(s) Oral at bedtime  glycerin Suppository - Adult 1Suppository(s) Rectal daily  pantoprazole Infusion 8mG/Hr IV Continuous <Continuous>  fluticasone propionate 50 MICROgram(s)/spray Nasal Spray 1Spray(s) Both Nostrils two times a day  furosemide   Injectable 20milliGRAM(s) IV Push daily    MEDICATIONS  (PRN):  nitroglycerin     SubLingual 0.4milliGRAM(s) SubLingual every 5 minutes PRN Chest Pain  ondansetron Injectable 4milliGRAM(s) IV Push every 6 hours PRN Nausea and/or Vomiting  diphenhydrAMINE   Elixir 12.5milliGRAM(s) Oral every 6 hours PRN Rash and/or Itching  oxyCODONE IR 5milliGRAM(s) Oral two times a day PRN Severe Pain (7 - 10)  acetaminophen   Tablet 650milliGRAM(s) Oral every 6 hours PRN For Temp greater than 38 C (100.4 F)      Allergies    No Known Allergies    Intolerances        REVIEW OF SYSTEMS:    CONSTITUTIONAL: No weakness, fevers or chills  RESPIRATORY: No cough, wheezing, hemoptysis; No shortness of breath  CARDIOVASCULAR: No chest pain or palpitations  GASTROINTESTINAL: No abdominal or epigastric pain. No nausea, vomiting, or hematemesis; No diarrhea or constipation. No melena or hematochezia.  All other review of systems is negative unless indicated above.    Vital Signs Last 24 Hrs  T(C): 36.9, Max: 37.2 (03-31 @ 17:22)  T(F): 98.4, Max: 98.9 (03-31 @ 17:22)  HR: 75 (75 - 105)  BP: 164/42 (159/36 - 166/39)  BP(mean): --  RR: 18 (17 - 18)  SpO2: 94% (94% - 99%)    PHYSICAL EXAM:    Constitutional: NAD, well-developed  Respiratory: CTAB  Cardiovascular: S1 and S2, RRR, no M/G/R  Gastrointestinal: BS+, soft, NT/ND  Extremities: No peripheral edema  Psychiatric: Normal mood, normal affect  Skin: No rashes    LABS:                        8.9    x     )-----------( x        ( 01 Apr 2017 05:56 )             27.6     31 Mar 2017 06:32    150    |  115    |  90     ----------------------------<  94     4.1     |  24     |  2.11     Ca    7.6        31 Mar 2017 06:32  Phos  2.9       31 Mar 2017 06:32    TPro  x      /  Alb  1.8    /  TBili  x      /  DBili  x      /  AST  x      /  ALT  x      /  AlkPhos  x      31 Mar 2017 06:32      LIVER FUNCTIONS - ( 31 Mar 2017 06:32 )  Alb: 1.8 g/dL / Pro: x     / ALK PHOS: x     / ALT: x     / AST: x     / GGT: x             RADIOLOGY & ADDITIONAL STUDIES:

## 2017-04-01 NOTE — PROGRESS NOTE ADULT - ASSESSMENT
CKD ARYA  EDEMA: cont w lasix 40 mg po bid monitor response prior to dc  Hypernatremia: monitor for possible improvement or worsening on lasix  Bilateral superficial phlebitis of wrists: warm soaks  creat stable ~ 2  anemia, iron deficiency, AOCD: may need LETICIA as outpt, will start po iron

## 2017-04-01 NOTE — PROGRESS NOTE ADULT - SUBJECTIVE AND OBJECTIVE BOX
asked to see pt by Dr. Sanabria for bilateral phlebitic segments of wrist areas: L dorsal surface and R ulnar surface; tender but not draining purulent material; no fever or chills    MEDICATIONS  (STANDING):  finasteride 5milliGRAM(s) Oral daily  buDESOnide   0.5 milliGRAM(s) Respule 0.5milliGRAM(s) Nebulizer two times a day  doxazosin 8milliGRAM(s) Oral at bedtime  isosorbide   mononitrate ER Tablet (IMDUR) 120milliGRAM(s) Oral daily  gabapentin Oral Tab/Cap - Peds 600milliGRAM(s) Oral at bedtime  allopurinol 100milliGRAM(s) Oral daily  loratadine 10milliGRAM(s) Oral daily  fenofibrate Tablet 145milliGRAM(s) Oral daily  simvastatin 10milliGRAM(s) Oral at bedtime  pramipexole 0.125milliGRAM(s) Oral three times a day  metoprolol Oral Tab/Cap - Peds 12.5milliGRAM(s) Oral two times a day  hydrALAZINE 150milliGRAM(s) Oral two times a day  ammonium lactate 12% Lotion 1Application(s) Topical two times a day  diltiazem   CD 240milliGRAM(s) Oral daily  senna 2Tablet(s) Oral at bedtime  glycerin Suppository - Adult 1Suppository(s) Rectal daily  pantoprazole Infusion 8mG/Hr IV Continuous <Continuous>  fluticasone propionate 50 MICROgram(s)/spray Nasal Spray 1Spray(s) Both Nostrils two times a day  furosemide    Tablet 40milliGRAM(s) Oral two times a day  ferrous    sulfate 325milliGRAM(s) Oral daily    MEDICATIONS  (PRN):  nitroglycerin     SubLingual 0.4milliGRAM(s) SubLingual every 5 minutes PRN Chest Pain  ondansetron Injectable 4milliGRAM(s) IV Push every 6 hours PRN Nausea and/or Vomiting  diphenhydrAMINE   Elixir 12.5milliGRAM(s) Oral every 6 hours PRN Rash and/or Itching  oxyCODONE IR 5milliGRAM(s) Oral two times a day PRN Severe Pain (7 - 10)  acetaminophen   Tablet 650milliGRAM(s) Oral every 6 hours PRN For Temp greater than 38 C (100.4 F)      Allergies    No Known Allergies    Intolerances        Flatus: [ ] YES [ ] NO             Bowel Movement: [ ] YES [ ] NO  Pain (0-10):            Pain Control Adequate: [ ] YES [ ] NO  Nausea: [ ] YES [ ] NO            Vomiting: [ ] YES [ ] NO  Diarrhea: [ ] YES [ ] NO         Constipation: [ ] YES [ ] NO     Chest Pain: [ ] YES [ ] NO    SOB:  [ ] YES [ ] NO    Vital Signs Last 24 Hrs  T(C): 36.5, Max: 37.2 (03-31 @ 17:22)  T(F): 97.7, Max: 98.9 (03-31 @ 17:22)  HR: 78 (75 - 105)  BP: 146/38 (146/38 - 166/39)  BP(mean): --  RR: 17 (17 - 18)  SpO2: 95% (94% - 99%)    I&O's Summary    I & Os for current day (as of 01 Apr 2017 16:24)  =============================================  IN: 424 ml / OUT: 800 ml / NET: -376 ml      Physical Exam:  General: NAD, resting comfortably  Pulmonary: normal resp effort, CTA-B  Cardiovascular: NSR  Abdominal: soft, NT/ND  Extremities:phlebitic segments of both upper extremities, probably related to IV   Neuro: A/O x 3, CNs II-XII grossly intact, normal motor/sensation, no focal deficits  Pulses:   Right:                                                                          Left:  FEM [ ]2+ [ ]1+ [ ]doppler                                             FEM [ ]2+ [ ]1+ [ ]doppler    POP [ ]2+ [ ]1+ [ ]doppler                                             POP [ ]2+ [ ]1+ [ ]doppler    DP [ ]2+ [ ]1+ [ ]doppler                                                DP [ ]2+ [ ]1+ [ ]doppler  PT[ ]2+ [ ]1+ [ ]doppler                                                  PT [ ]2+ [ ]1+ [ ]doppler    LABS:                        8.9    x     )-----------( x        ( 01 Apr 2017 05:56 )             27.6     31 Mar 2017 06:32    150    |  115    |  90     ----------------------------<  94     4.1     |  24     |  2.11     Ca    7.6        31 Mar 2017 06:32  Phos  2.9       31 Mar 2017 06:32    TPro  x      /  Alb  1.8    /  TBili  x      /  DBili  x      /  AST  x      /  ALT  x      /  AlkPhos  x      31 Mar 2017 06:32        LIVER FUNCTIONS - ( 31 Mar 2017 06:32 )  Alb: 1.8 g/dL / Pro: x     / ALK PHOS: x     / ALT: x     / AST: x     / GGT: x           CAPILLARY BLOOD GLUCOSE      RADIOLOGY & ADDITIONAL TESTS:

## 2017-04-01 NOTE — PROGRESS NOTE ADULT - ASSESSMENT
bilateral upper extremity phlebitic segments without evidence of suppuration    continue warm, moist heat

## 2017-04-01 NOTE — PROGRESS NOTE ADULT - SUBJECTIVE AND OBJECTIVE BOX
CHIEF COMPLAINT: anemia    4/1 SUBJECTIVE: Pt was seen and examined.  Chart reviewed.  No further bleeding.  Requesting diet be advanced to regular.     REVIEW OF SYSTEMS:    CONSTITUTIONAL: No weakness, fevers or chills  EYES/ENT: No visual changes;  No vertigo or throat pain   NECK: No pain or stiffness  RESPIRATORY: No cough, wheezing, hemoptysis; No shortness of breath  CARDIOVASCULAR: No chest pain or palpitations  GASTROINTESTINAL: No abdominal or epigastric pain. No nausea, vomiting, or hematemesis; No diarrhea or constipation. No melena or hematochezia.  GENITOURINARY: No dysuria, frequency or hematuria  NEUROLOGICAL: No numbness or weakness  SKIN: No itching, burning, rashes, or lesions   All other review of systems is negative unless indicated above    Vital Signs Last 24 Hrs  T(C): 36.8, Max: 37.3 (03-30 @ 23:30)  T(F): 98.2, Max: 99.2 (03-31 @ 02:50)  HR: 73 (53 - 104)  BP: 149/86 (131/35 - 150/32)  BP(mean): --  RR: 16 (16 - 18)  SpO2: 99% (94% - 99%)    I&O's Summary    I & Os for current day (as of 31 Mar 2017 08:27)  =============================================  IN: 420.1 ml / OUT: 1500 ml / NET: -1079.9 ml      CAPILLARY BLOOD GLUCOSE      PHYSICAL EXAM:    Constitutional: NAD, awake and alert, well-developed  HEENT: PERR, EOMI, Normal Hearing, MMM  Neck: Soft and supple, No LAD, No JVD  Respiratory: Breath sounds are clear bilaterally, No wheezing, rales or rhonchi  Cardiovascular: S1 and S2, regular rate and rhythm, no Murmurs, gallops or rubs  Gastrointestinal: Bowel Sounds present, soft, nontender, nondistended, no guarding, no rebound  Extremities: No peripheral edema  Vascular: 2+ peripheral pulses  Neurological: A/O x 3, no focal deficits  Musculoskeletal: 5/5 strength b/l upper and lower extremities  Skin: No rashes    MEDICATIONS:  MEDICATIONS  (STANDING):  finasteride 5milliGRAM(s) Oral daily  buDESOnide   0.5 milliGRAM(s) Respule 0.5milliGRAM(s) Nebulizer two times a day  doxazosin 8milliGRAM(s) Oral at bedtime  isosorbide   mononitrate ER Tablet (IMDUR) 120milliGRAM(s) Oral daily  gabapentin Oral Tab/Cap - Peds 600milliGRAM(s) Oral at bedtime  allopurinol 100milliGRAM(s) Oral daily  loratadine 10milliGRAM(s) Oral daily  fenofibrate Tablet 145milliGRAM(s) Oral daily  simvastatin 10milliGRAM(s) Oral at bedtime  pramipexole 0.125milliGRAM(s) Oral three times a day  metoprolol Oral Tab/Cap - Peds 12.5milliGRAM(s) Oral two times a day  hydrALAZINE 150milliGRAM(s) Oral two times a day  ammonium lactate 12% Lotion 1Application(s) Topical two times a day  diltiazem   CD 240milliGRAM(s) Oral daily  senna 2Tablet(s) Oral at bedtime  glycerin Suppository - Adult 1Suppository(s) Rectal daily  pantoprazole Infusion 8mG/Hr IV Continuous <Continuous>  fluticasone propionate 50 MICROgram(s)/spray Nasal Spray 1Spray(s) Both Nostrils two times a day      LABS: All Labs Reviewed:                        8.0    x     )-----------( x        ( 31 Mar 2017 03:29 )             24.0     31 Mar 2017 06:32    150    |  115    |  90     ----------------------------<  94     4.1     |  24     |  2.11     Ca    7.6        31 Mar 2017 06:32  Phos  2.9       31 Mar 2017 06:32    TPro  x      /  Alb  1.8    /  TBili  x      /  DBili  x      /  AST  x      /  ALT  x      /  AlkPhos  x      31 Mar 2017 06:32      IMAGING/RADIO/NUCLEAR:    Bleeding Scan 3/30/17:    "CLINICAL INFORMATION: 82 year old male with G.I. bleeding; referred to   localize bleeding site.     TECHNIQUE:  Dynamic images of the anterior abdomen/pelvis were obtained   for 2 hours following administration of labeled autologous red blood   cells. Static images of the abdomen/pelvis were obtained in the frontal   and lateralprojections immediately thereafter. An anterior image of the   head/neck was obtained for  purposes.    COMPARISON: None    FINDINGS: Focal accumulation of tracer in the distal stomach/proximal   duodenum which progresses distally though rthe small bowel.    IMPRESSION: Abnormal GI bleeding scan.    Active bleeding site in distal stomach/proximal proximal duodenum."        82 year old man w/ PMH of HTN, COPD on home O2 2L, SHAYY on nocturnal BIPAP, ex-smoker (smoked 1ppd X 50 years, quit 22 years ago),  Chronic diastolic CHF, CAD s/p PCI with stent in 2002, Last cath in july 2014 with RCA disease medically managed, Hyperlipidemia, PVD, Iron deficiency anemia,Chronic back pain, Gout, BPH, CKD III with baseline Cr 2.2,  PAF not on a/c, Hx of GI Bleed in the past 10/16 had sigmoidoscopy , hemorrhoids s/p banding, family hx of htn, dm, colon ca, now presenting with low hb. Patient states he went to his pcp and was found to have low hb and presented to ER. In Er he was found to have hb of 6.3.  Found to have bun/cr of 114/3.51.     1-Acute anemia ,  secondary to blood loss anemia  -Patients denies noticing any blood in stool or urine, as well has any hemetemsis  -His baseline hb is around 8.4 , admitted with 6.3  -s/p 11 units since admission.  Hgb steady 8.9-9.0  - EGD/colonscopy that revealed  non-bleeding ulcers (one with eschar) in stomach and colonic mass.   Difloid lesion in distal duoden s/p cauterization  -His GI doc is Dr. Koch, consult appreciated  -No source of bleed; patient was sent for Bleeding scan 3/30 to determine site of bleed. Test result is above and is a positive test for stomach and duodenal bleed.(results above)  -Advance diet today from clears to regular     2-CAD w/ recent stent placement  -cardio on board  -recco to take off aspirin temporarily until gi bleed resolves  -for asp/plavix to be re-started when h+h stable      3-Chronic SHAYY and  copd  w/ chronic respiratory failure on home o2 and bipap nightly  -c/w night bipap and supplemental o2  -currently comfortable and not in exacerbation    4- Acute renal failure on stage III CKD possibly secondary to ATN secondary to volume loss  -Baseline creatinine is 2.4 (2017)  -Admited with 3.51  - Cr now improved to 2.11  -nephrology services on board  -hold lasix as per nephro or use intermittently    5- Acute back 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 Facet osteophytic hypertrophy is noted at C   L3-4 through L5-S1 with multiple posterior projecting synovial cyst   involving the LEFT L3-4, BILATERAL L4-5 and BILATERAL L5-S1 facet joints.  -physical therapy for discharge  -pain control    6-Paroxysmal atrial fibrillation.  Plan: currently in sinus rhythm on EKG   not on AC due to hx of GI bleed.     7-Chronic diastolic congestive heart failure.   -not in exacerbation    8-DVt proph- b/l SCDS only secondary to gi bleed    9-mild hypernatremia- possible secondary to mild dehydration  -promote oral fluid intake.   -hold lasix  - Renal is following       10 - b/l  chronic venous stasis dermatitis and ulcer on the right lower extremity anterior surface  -PTA, wound care with Dr Cardenas    -wound care nurse evaluation and consultation appreciated   -wound care recs: patient has been undergoing whirlpool treatment to both lower extremities to remove debris and assist with odor control. 1) Daily Whirlpool treatments with 2 drops of hibiclens 2) After whirlpool apply Xeroform, kerlix and ace bandage. 3) Elevate extremities off mattress. 4) Turn and position every 2 hours  -investigate surgical shoes for feet (ordered via nursing). currently wearing cut crocs."    11-lower extremity diabetic neuropahty  -c/w gabapentin  -c/w acetaminophen  -c/w PRN oxycodone for severe pain    12-Essential hypertension  -c/w current management BP stable over course of admission.       13-:Morbid Obesity  -bMI >35  -nutrition consult appreciated    DISPO : Likely d/c to rehab on Monday

## 2017-04-02 LAB
ANION GAP SERPL CALC-SCNC: 11 MMOL/L — SIGNIFICANT CHANGE UP (ref 5–17)
BUN SERPL-MCNC: 69 MG/DL — HIGH (ref 7–23)
CALCIUM SERPL-MCNC: 7.7 MG/DL — LOW (ref 8.5–10.1)
CHLORIDE SERPL-SCNC: 115 MMOL/L — HIGH (ref 96–108)
CO2 SERPL-SCNC: 24 MMOL/L — SIGNIFICANT CHANGE UP (ref 22–31)
CREAT SERPL-MCNC: 1.91 MG/DL — HIGH (ref 0.5–1.3)
GLUCOSE SERPL-MCNC: 113 MG/DL — HIGH (ref 70–99)
HCT VFR BLD CALC: 26.2 % — LOW (ref 39–50)
HCT VFR BLD CALC: 26.4 % — LOW (ref 39–50)
HGB BLD-MCNC: 8.6 G/DL — LOW (ref 13–17)
HGB BLD-MCNC: 8.7 G/DL — LOW (ref 13–17)
MCHC RBC-ENTMCNC: 30.3 PG — SIGNIFICANT CHANGE UP (ref 27–34)
MCHC RBC-ENTMCNC: 33.1 GM/DL — SIGNIFICANT CHANGE UP (ref 32–36)
MCV RBC AUTO: 91.6 FL — SIGNIFICANT CHANGE UP (ref 80–100)
PLATELET # BLD AUTO: 280 K/UL — SIGNIFICANT CHANGE UP (ref 150–400)
POTASSIUM SERPL-MCNC: 3.6 MMOL/L — SIGNIFICANT CHANGE UP (ref 3.5–5.3)
POTASSIUM SERPL-SCNC: 3.6 MMOL/L — SIGNIFICANT CHANGE UP (ref 3.5–5.3)
RBC # BLD: 2.86 M/UL — LOW (ref 4.2–5.8)
RBC # FLD: 15.4 % — HIGH (ref 10.3–14.5)
SODIUM SERPL-SCNC: 150 MMOL/L — HIGH (ref 135–145)
WBC # BLD: 7.5 K/UL — SIGNIFICANT CHANGE UP (ref 3.8–10.5)
WBC # FLD AUTO: 7.5 K/UL — SIGNIFICANT CHANGE UP (ref 3.8–10.5)

## 2017-04-02 RX ADMIN — Medication 650 MILLIGRAM(S): at 19:09

## 2017-04-02 RX ADMIN — Medication 30 MILLILITER(S): at 20:04

## 2017-04-02 RX ADMIN — LORATADINE 10 MILLIGRAM(S): 10 TABLET ORAL at 11:42

## 2017-04-02 RX ADMIN — ISOSORBIDE MONONITRATE 120 MILLIGRAM(S): 60 TABLET, EXTENDED RELEASE ORAL at 11:41

## 2017-04-02 RX ADMIN — Medication 12.5 MILLIGRAM(S): at 17:21

## 2017-04-02 RX ADMIN — PANTOPRAZOLE SODIUM 10 MG/HR: 20 TABLET, DELAYED RELEASE ORAL at 03:29

## 2017-04-02 RX ADMIN — PRAMIPEXOLE DIHYDROCHLORIDE 0.12 MILLIGRAM(S): 0.12 TABLET ORAL at 22:04

## 2017-04-02 RX ADMIN — Medication 150 MILLIGRAM(S): at 17:21

## 2017-04-02 RX ADMIN — PRAMIPEXOLE DIHYDROCHLORIDE 0.12 MILLIGRAM(S): 0.12 TABLET ORAL at 06:11

## 2017-04-02 RX ADMIN — Medication 1 APPLICATION(S): at 06:11

## 2017-04-02 RX ADMIN — Medication 325 MILLIGRAM(S): at 11:41

## 2017-04-02 RX ADMIN — Medication 0.5 MILLIGRAM(S): at 20:59

## 2017-04-02 RX ADMIN — Medication 145 MILLIGRAM(S): at 11:42

## 2017-04-02 RX ADMIN — Medication 150 MILLIGRAM(S): at 06:11

## 2017-04-02 RX ADMIN — SENNA PLUS 2 TABLET(S): 8.6 TABLET ORAL at 22:04

## 2017-04-02 RX ADMIN — Medication 40 MILLIGRAM(S): at 06:11

## 2017-04-02 RX ADMIN — Medication 1 SPRAY(S): at 17:22

## 2017-04-02 RX ADMIN — FINASTERIDE 5 MILLIGRAM(S): 5 TABLET, FILM COATED ORAL at 11:42

## 2017-04-02 RX ADMIN — Medication 240 MILLIGRAM(S): at 06:11

## 2017-04-02 RX ADMIN — SIMVASTATIN 10 MILLIGRAM(S): 20 TABLET, FILM COATED ORAL at 22:04

## 2017-04-02 RX ADMIN — Medication 12.5 MILLIGRAM(S): at 06:11

## 2017-04-02 RX ADMIN — Medication 0.5 MILLIGRAM(S): at 08:57

## 2017-04-02 RX ADMIN — PRAMIPEXOLE DIHYDROCHLORIDE 0.12 MILLIGRAM(S): 0.12 TABLET ORAL at 17:22

## 2017-04-02 RX ADMIN — Medication 100 MILLIGRAM(S): at 11:41

## 2017-04-02 RX ADMIN — Medication 8 MILLIGRAM(S): at 22:04

## 2017-04-02 RX ADMIN — Medication 1 SPRAY(S): at 06:12

## 2017-04-02 RX ADMIN — GABAPENTIN 600 MILLIGRAM(S): 400 CAPSULE ORAL at 22:04

## 2017-04-02 RX ADMIN — Medication 40 MILLIGRAM(S): at 17:22

## 2017-04-02 RX ADMIN — PANTOPRAZOLE SODIUM 10 MG/HR: 20 TABLET, DELAYED RELEASE ORAL at 20:59

## 2017-04-02 NOTE — PROGRESS NOTE ADULT - ASSESSMENT
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

## 2017-04-02 NOTE — PROGRESS NOTE ADULT - ASSESSMENT
82 year old man w/ PMH of HTN, COPD on home O2 2L, SHAYY on nocturnal BIPAP, ex-smoker (smoked 1ppd X 50 years, quit 22 years ago),  Chronic diastolic CHF, CAD s/p PCI with stent in 2002, Last cath in july 2014 with RCA disease medically managed, Hyperlipidemia, PVD, Iron deficiency anemia,Chronic back pain, Gout, BPH, CKD III with baseline Cr 2.2,  PAF not on a/c, Hx of GI Bleed in the past 10/16 had sigmoidoscopy , hemorrhoids s/p banding, family hx of htn, dm, colon ca, now presenting with low hb. Patient states he went to his pcp and was found to have low hb and presented to ER. In Er he was found to have hb of 6.3.  Found to have bun/cr of 114/3.51.     1-Acute anemia ,  secondary to blood loss anemia  -s/p 11 units since admission.  Hgb steady 8.9-9.0  - EGD/colonscopy that revealed  non-bleeding ulcers (one with eschar) in stomach and colonic mass.   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 diet as per GI    2-CAD w/ recent stent placement  -cardio on board, off asa/plavix for now    3-Chronic SHAYY and  copd  w/ chronic respiratory failure on home o2 and bipap nightly. c/w night bipap and supplemental o2    4- Acute renal failure on stage III CKD possibly secondary to ATN secondary to volume loss  -Baseline creatinine is 2.4 (2017).Admited with 3.51. Cr now improved to 1.91.nephrology services on board .hold lasix as per nephro or use intermittently    5- Acute back pain secondary to spinal arthropathy   MRI lumbar spine noted. -physical therapy for discharge. pain control    6-Paroxysmal atrial fibrillation.  Plan: currently in sinus rhythm on EKG . not on AC due to hx of GI bleed.     7-Chronic diastolic congestive heart failure. not in exacerbation    8-DVt proph- b/l SCDS only secondary to gi bleed    9-mild hypernatremia- possible secondary to mild dehydration.promote oral fluid intake. -hold lasix - Renal is following     10 - b/l  chronic venous stasis dermatitis and ulcer on the right lower extremity anterior surface. PTA, wound care with Dr Cardenas    -wound care recs: patient has been undergoing whirlpool treatment to both lower extremities to remove debris and assist with odor control. 1) Daily Whirlpool treatments with 2 drops of hibiclens 2) After whirlpool apply Xeroform, kerlix and ace bandage. 3) Elevate extremities off mattress. 4) Turn and position every 2 hours  -investigate surgical shoes for feet (ordered via nursing). currently wearing cut crocs."    11-lower extremity diabetic neuropahty. c/w gabapentin, acetaminophen. PRN oxycodone for severe pain    12-Essential hypertension: c/w current management BP stable over course of admission.     13-:Morbid Obesity :bMI >35 . nutrition consult appreciated    poc discussed with pt, family, team

## 2017-04-02 NOTE — PROGRESS NOTE ADULT - SUBJECTIVE AND OBJECTIVE BOX
4/2: no more bleeding      PHYSICAL EXAM:    Daily     Daily     ICU Vital Signs Last 24 Hrs  T(C): 36.6, Max: 37.2 (04-01 @ 19:51)  T(F): 97.9, Max: 99 (04-01 @ 19:51)  HR: 72 (72 - 85)  BP: 149/41 (142/45 - 155/60)  BP(mean): --  ABP: --  ABP(mean): --  RR: 17 (17 - 18)  SpO2: 99% (96% - 99%)      Constitutional: Weak appearing  HEENT: Atraumatic, DEBBIE, Normal, No congestion  Respiratory: Breath Sounds normal, no rhonchi/wheeze  Cardiovascular: N S1S2; BEN present  Gastrointestinal: Abdomen soft, non tender, Bowel Sounds present  Extremities: No edema, peripheral pulses present  Neurological: AAO x 3, no gross focal motor deficits  Skin: Non cellulitic, no rash, ulcers  Lymph Nodes: No lymphadenopathy noted  Back: No CVA tenderness   Musculoskeletal: non tender  Breasts: Deferred  Genitourinary: deferred  Rectal: Deferred                          8.7    7.5   )-----------( 280      ( 02 Apr 2017 05:52 )             26.2       CBC Full  -  ( 02 Apr 2017 05:52 )  WBC Count : 7.5 K/uL  Hemoglobin : 8.7 g/dL  Hematocrit : 26.2 %  Platelet Count - Automated : 280 K/uL  Mean Cell Volume : 91.6 fl  Mean Cell Hemoglobin : 30.3 pg  Mean Cell Hemoglobin Concentration : 33.1 gm/dL  Auto Neutrophil # : x  Auto Lymphocyte # : x  Auto Monocyte # : x  Auto Eosinophil # : x  Auto Basophil # : x  Auto Neutrophil % : x  Auto Lymphocyte % : x  Auto Monocyte % : x  Auto Eosinophil % : x  Auto Basophil % : x      02 Apr 2017 05:52    150    |  115    |  69     ----------------------------<  113    3.6     |  24     |  1.91     Ca    7.7        02 Apr 2017 05:52                              MEDICATIONS  (STANDING):  finasteride 5milliGRAM(s) Oral daily  buDESOnide   0.5 milliGRAM(s) Respule 0.5milliGRAM(s) Nebulizer two times a day  doxazosin 8milliGRAM(s) Oral at bedtime  isosorbide   mononitrate ER Tablet (IMDUR) 120milliGRAM(s) Oral daily  gabapentin Oral Tab/Cap - Peds 600milliGRAM(s) Oral at bedtime  allopurinol 100milliGRAM(s) Oral daily  loratadine 10milliGRAM(s) Oral daily  fenofibrate Tablet 145milliGRAM(s) Oral daily  simvastatin 10milliGRAM(s) Oral at bedtime  pramipexole 0.125milliGRAM(s) Oral three times a day  metoprolol Oral Tab/Cap - Peds 12.5milliGRAM(s) Oral two times a day  hydrALAZINE 150milliGRAM(s) Oral two times a day  ammonium lactate 12% Lotion 1Application(s) Topical two times a day  diltiazem   CD 240milliGRAM(s) Oral daily  senna 2Tablet(s) Oral at bedtime  glycerin Suppository - Adult 1Suppository(s) Rectal daily  pantoprazole Infusion 8mG/Hr IV Continuous <Continuous>  fluticasone propionate 50 MICROgram(s)/spray Nasal Spray 1Spray(s) Both Nostrils two times a day  furosemide    Tablet 40milliGRAM(s) Oral two times a day  ferrous    sulfate 325milliGRAM(s) Oral daily

## 2017-04-02 NOTE — PROGRESS NOTE ADULT - ASSESSMENT
82 y/o male with PMHx of COPD, CHF, chronic renal failure, paroxysmal A-fib.with last hospitaliztion 10/2017 for hematochezia s/p bleeding scan and flex sigmoidoscopy  was BIBA  now being worked up for GI bleed and has required multiple 4-5 units PRBCs done  now needs eval for upper and likley lower endoscopy  Chronic hypoxemic resp failure  CHF appears stable  P Afib now in nsr  hemodynamically stabe  SHAYY / OHS /COPD-overlap syndrome  suggest ABG and repeat cxr today to complete her workup  I've explained to pt and his DTR, critical care RN that pt is at incresaed risk of resp failure requiring mechanical ventilation with sedation, but his pulm status appears optimized presently for this indicated procedure with recent requirement to get muliple prbcs over last few days  recommend  ABG noted  cxr is clear  cont nocturnal BIPAP  Anesthesia veal before his procedure  cardiac eval in progress  will check outpt pfts, placed in chart  ABG noted  FEV1 6/2016 1.38, 68% predicted  DLCO 45% predicted  supportive care  BIPAP 12/8 with o2 2 liters attached  plan as per GI  cont current rx  supportive care required  Add flonase nasal spray   all recent data discussed with pt today 3/28  case discussed with Dr Hernández today  GI eval in progress for anemia  check repeat cxr today  may need additional endoscopy for anemia  hemodynamically stable  retrocardiac opacity likley atelectasis but ceratinly at increased risk of pneumonia  encourage incentive spirometry  monitor wbc and temps  duoneb with nebulizer rx  fu cxr noted  fu HCT  Dc planning as per GI  pulm status stable

## 2017-04-02 NOTE — PROGRESS NOTE ADULT - ASSESSMENT
CKD ARYA  EDEMA: cont w lasix 40 mg po bid monitor response prior to dc  Hypernatremia: monitor for possible improvement or worsening on lasix  Bilateral superficial phlebitis of wrists: warm soaks  creat stable ~ 2  anemia, iron deficiency, AOCD: may need LETICIA as outpt, will start po iron    4/2  creat improving  edema less as per pt  c/o abd gas, will start simethicone  tolerating lasix 40 mg bid  anemia started iron may need LETICIA as outpt

## 2017-04-02 NOTE — PROGRESS NOTE ADULT - SUBJECTIVE AND OBJECTIVE BOX
Patient is a 82y old  Male who presents with a chief complaint of left leg pain radiating from left buttoch and thigh (21 Mar 2017 06:35)      HPI:  Pt feeling ok. tolerating clears  no active bleeding        PAST MEDICAL & SURGICAL HISTORY:  Sepsis, due to unspecified organism: 2/2 poorly healing wounds b/l  BPH (benign prostatic hypertrophy)  Hyperlipemia  Coronary artery disease  Hypertension  Dyspepsia: On moderate exertion.  Sleep apnea, obstructive: Requires home 02 therapy, and treatment with BIPAP  Atelectasis  Pleural effusion, bilateral  Respiratory failure  Peripheral edema  CRI (chronic renal insufficiency)  Gout  CRF (chronic renal failure)  Benign prostatic hypertrophy  Spinal stenosis  Hypercholesterolemia  GERD (gastroesophageal reflux disease)  CAD (coronary artery disease)  Hypertension  S/P angioplasty with stent  Cataract of left eye  Prostate: Surgery green light procedure.  S/P rotator cuff surgery: Right  S/P angioplasty  Rotator cuff tear, right: repair      MEDICATIONS  (STANDING):  finasteride 5milliGRAM(s) Oral daily  buDESOnide   0.5 milliGRAM(s) Respule 0.5milliGRAM(s) Nebulizer two times a day  doxazosin 8milliGRAM(s) Oral at bedtime  isosorbide   mononitrate ER Tablet (IMDUR) 120milliGRAM(s) Oral daily  gabapentin Oral Tab/Cap - Peds 600milliGRAM(s) Oral at bedtime  allopurinol 100milliGRAM(s) Oral daily  loratadine 10milliGRAM(s) Oral daily  fenofibrate Tablet 145milliGRAM(s) Oral daily  simvastatin 10milliGRAM(s) Oral at bedtime  pramipexole 0.125milliGRAM(s) Oral three times a day  metoprolol Oral Tab/Cap - Peds 12.5milliGRAM(s) Oral two times a day  hydrALAZINE 150milliGRAM(s) Oral two times a day  ammonium lactate 12% Lotion 1Application(s) Topical two times a day  diltiazem   CD 240milliGRAM(s) Oral daily  senna 2Tablet(s) Oral at bedtime  glycerin Suppository - Adult 1Suppository(s) Rectal daily  pantoprazole Infusion 8mG/Hr IV Continuous <Continuous>  fluticasone propionate 50 MICROgram(s)/spray Nasal Spray 1Spray(s) Both Nostrils two times a day  furosemide    Tablet 40milliGRAM(s) Oral two times a day  ferrous    sulfate 325milliGRAM(s) Oral daily    MEDICATIONS  (PRN):  nitroglycerin     SubLingual 0.4milliGRAM(s) SubLingual every 5 minutes PRN Chest Pain  ondansetron Injectable 4milliGRAM(s) IV Push every 6 hours PRN Nausea and/or Vomiting  diphenhydrAMINE   Elixir 12.5milliGRAM(s) Oral every 6 hours PRN Rash and/or Itching  oxyCODONE IR 5milliGRAM(s) Oral two times a day PRN Severe Pain (7 - 10)  acetaminophen   Tablet 650milliGRAM(s) Oral every 6 hours PRN For Temp greater than 38 C (100.4 F)  Allergies  No Known Allergies    REVIEW OF SYSTEMS:  CONSTITUTIONAL: No weakness, fevers or chills  RESPIRATORY: No cough, wheezing, hemoptysis; No shortness of breath  CARDIOVASCULAR: No chest pain or palpitations  GASTROINTESTINAL: No abdominal or epigastric pain.   All other review of systems is negative unless indicated above.    Vital Signs Last 24 Hrs  T(C): 36.6, Max: 37.2 (04-01 @ 19:51)  T(F): 97.9, Max: 99 (04-01 @ 19:51)  HR: 72 (72 - 85)  BP: 149/41 (142/45 - 155/60)  BP(mean): --  RR: 17 (17 - 18)  SpO2: 99% (96% - 99%)    PHYSICAL EXAM:    Constitutional: NAD, well-developed  Respiratory: CTAB  Cardiovascular: S1 and S2, RRR,  Gastrointestinal: BS+, soft, NT/ND      LABS:                        8.7    7.5   )-----------( 280      ( 02 Apr 2017 05:52 )             26.2     02 Apr 2017 05:52    150    |  115    |  69     ----------------------------<  113    3.6     |  24     |  1.91     Ca    7.7        02 Apr 2017 05:52            RADIOLOGY & ADDITIONAL STUDIES:

## 2017-04-02 NOTE — PROGRESS NOTE ADULT - SUBJECTIVE AND OBJECTIVE BOX
Patient is a 82y old  Male who presents with a chief complaint of left leg pain radiating from left buttoch and thigh (21 Mar 2017 06:35)      HPI:  HPI: :: 80 y/o male with PMHx of COPD, CHF, chronic renal failure, paroxysmal A-fib.with last hospitaliztion 10/2017 for hematochezia s/p bleeding scan and flex sigmoidoscopy  was BIBA for acute worsening lower back pain radiating to buttock to foot   patient with amber boots for chronic venous stasis ulcers and lower extremity chronic edema ,daughter reports increased oozing from ulcers in lai past few weeks,no fever  In Ed patient noted to have Hb 6 and stool guaiac positive  and rectal exam- with cookie  done by ED physician as per note   Patient denies any chest pain or worsening of SOB ,no abdominal pain or vomiting ,no diarrhea,no efver or chills  seen and evaluated along with Dr SANTIZO  His dtr is contacted as well    all his recent data and PFT data obtained and discussed with his cardiologist today 3/24  pt is scheduled for his GI workup today    3/27  pt is seen and examined and feels better  asks for food  no further transfusions required  no distress  tolerated procedure well  no co  no sob  3/28  asks about going home  some nasal congestion  no co  no difficulty bretahing  uses bipap at night  3/29  feels well this am  smiling  no dyspnea  nasal congestion improved with nasal sparys use  3/30  no acute distress  getting cleaned this am while in bed  bilateral leg ulcers noted  3/31  going to repeat endoscopy this am after bleeding scan noted  dtrs at bedside  no resp issues reported at all  feels ok  no cp or sob at all  4/2  feels better overall  no cp  smioling  having bkfst without o2 this am  PMHx: ::  HTN,  COPD on home O2 2L,  SHAYY on nocturnal BIPAP,  Chronic diastolic CHF,  CAD s/p PCI with stent in 2002,  Last cath in july 2014 with RCA disease medically managed, Hyperlipidemia,  PVD,  Iron deficiency anemia,  Chronic back pain,  Gout,  BPH,  CKD III with baseline Cr 2.2,  PAF not on a/c,  Hx of GI Bleed in the past, hemorrhoids s/p banding,  PSHx: ::  Right rotator cuff repair  pilonidal cyst  Family History: ::  Father: rectal CA  Mother: HTN, DM  3 siblings: DM  Social History: ::  Tobacco: smoked 1ppd X 50 years, quit 22 years ago.  Rare ETOH use.  wife recently passed away last year (21 Mar 2017 06:35)      PAST MEDICAL & SURGICAL HISTORY:  Sepsis, due to unspecified organism: 2/2 poorly healing wounds b/l  BPH (benign prostatic hypertrophy)  Hyperlipemia  Coronary artery disease  Hypertension  Dyspepsia: On moderate exertion.  Sleep apnea, obstructive: Requires home 02 therapy, and treatment with BIPAP  Atelectasis  Pleural effusion, bilateral  Respiratory failure  Peripheral edema  CRI (chronic renal insufficiency)  Gout  CRF (chronic renal failure)  Benign prostatic hypertrophy  Spinal stenosis  Hypercholesterolemia  GERD (gastroesophageal reflux disease)  CAD (coronary artery disease)  Hypertension  S/P angioplasty with stent  Cataract of left eye  Prostate: Surgery green light procedure.  S/P rotator cuff surgery: Right  S/P angioplasty  Rotator cuff tear, right: repair      PREVIOUS DIAGNOSTIC TESTING:    MEDICATIONS  (STANDING):  finasteride 5milliGRAM(s) Oral daily  buDESOnide   0.5 milliGRAM(s) Respule 0.5milliGRAM(s) Nebulizer two times a day  doxazosin 8milliGRAM(s) Oral at bedtime  isosorbide   mononitrate ER Tablet (IMDUR) 120milliGRAM(s) Oral daily  gabapentin Oral Tab/Cap - Peds 600milliGRAM(s) Oral at bedtime  allopurinol 100milliGRAM(s) Oral daily  loratadine 10milliGRAM(s) Oral daily  fenofibrate Tablet 145milliGRAM(s) Oral daily  simvastatin 10milliGRAM(s) Oral at bedtime  pramipexole 0.125milliGRAM(s) Oral three times a day  metoprolol Oral Tab/Cap - Peds 12.5milliGRAM(s) Oral two times a day  hydrALAZINE 150milliGRAM(s) Oral two times a day  ammonium lactate 12% Lotion 1Application(s) Topical two times a day  diltiazem   CD 240milliGRAM(s) Oral daily  senna 2Tablet(s) Oral at bedtime  glycerin Suppository - Adult 1Suppository(s) Rectal daily  acetaminophen   Tablet 650milliGRAM(s) Oral every 6 hours  pantoprazole Infusion 8mG/Hr IV Continuous <Continuous>  sucralfate 1Gram(s) Oral four times a day    MEDICATIONS  (PRN):  nitroglycerin     SubLingual 0.4milliGRAM(s) SubLingual every 5 minutes PRN Chest Pain  ondansetron Injectable 4milliGRAM(s) IV Push every 6 hours PRN Nausea and/or Vomiting  diphenhydrAMINE   Elixir 12.5milliGRAM(s) Oral every 6 hours PRN Rash and/or Itching        FAMILY HISTORY:  No pertinent family history in first degree relatives          REVIEW OF SYSTEM:  Pertinent items are noted in HPI.  Constitutional negative for chills, fevers, sweats and weight loss  throat, and face:  negative for epistaxis, nasal congestion, sore throat and   tinnitus  Respiratory: negative for cough,pos  dyspnea on exertion,no pleuritic chest pain  and wheezing  Cardiovascular:  negative for chest pain, dyspnea and palpitations  Gastrointestinal: negative for abdominal pain, diarrhea, nausea and vomiting  Genitourinary: negative for dysuria, frequency and urinary incontinence  Skin:  negative for redness, rash, pruritus, swelling, dryness and   fissures  Hematologic/lymphatic: negative for bleeding and easy bruising  Musculoskeletal: negative for arthralgias, back pain and muscle weakness  Neurological: negative for dizziness, headaches, seizures and tremors  Behavioral/Psych:  negative for mood change, depression, suicidal attempts    Allergic/Immunologic: negative for anaphylaxis, angioedema and urticaria    Vital Signs Last 24 Hrs  T(C): 36.4, Max: 37.2 (04-01 @ 19:51)  T(F): 97.6, Max: 99 (04-01 @ 19:51)  HR: 85 (75 - 85)  BP: 144/39 (142/45 - 155/60)  BP(mean): --  RR: 18 (17 - 18)  SpO2: 96% (95% - 99%)  PHYSICAL EXAM  General Appearance: cooperative, no acute distress,   HEENT: PERRL, conjunctiva clear, EOM's intact, non injected pharynx, no exudate, TM   normal  Neck: Supple, , no adenopathy, thyroid: not enlarged, no carotid bruit or JVD  Back: Symmetric, no  tenderness,no soft tissue tenderness  Lungs: Clear to auscultation bilateral,no adventitious breath sounds, normal   expiratory phase  Heart: Regular rate and rhythm, S1, S2 normal, no murmur, rub or gallop  Abdomen: Soft, non-tender, bowel sounds active , no hepatosplenomegaly  Extremities: no cyanosis or edema, no joint swelling  Skin: Skin color, texture normal, no rashes   Neurologic: Alert and oriented X3 , cranial nerves intact, sensory and motor normal,    ECG:    LABS:                                     8.7    7.5   )-----------( 280      ( 02 Apr 2017 05:52 )             26.2                8.0    x     )-----------( x        ( 31 Mar 2017 03:29 )             24.0                             7.0    7.5   )-----------( 271      ( 30 Mar 2017 05:24 )             21.9                           7.1    6.9   )-----------( 264      ( 29 Mar 2017 05:51 )             22.0                        28 Mar 2017 06:03    149    |  117    |  82     ----------------------------<  125    4.3     |  24     |  1.98     Ca    7.8        28 Mar 2017 06:03                    7.7    8.8   )-----------( 239      ( 27 Mar 2017 05:49 )             24.0       27 Mar 2017 05:49    151    |  117    |  77     ----------------------------<  110    4.1     |  23     |  1.87     Ca    8.0        27 Mar 2017 05:49                 8.2    x     )-----------( x        ( 23 Mar 2017 07:54 )             25.4     23 Mar 2017 05:00    151    |  118    |  107    ----------------------------<  113    4.5     |  23     |  2.50     Ca    7.8        23 Mar 2017 05:00    TPro  5.7    /  Alb  2.4    /  TBili  0.5    /  DBili  x      /  AST  23     /  ALT  11     /  AlkPhos  35     21 Mar 2017 09:55    CARDIAC MARKERS ( 21 Mar 2017 17:23 )  0.028 ng/mL / x     / 196 U/L / x     / x      CARDIAC MARKERS ( 21 Mar 2017 09:55 )  0.023 ng/mL / x     / 172 U/L / x     / x            Pro BNP  1025 03-21 @ 09:55  D Dimer  -- 03-21 @ 09:55    PT/INR - ( 21 Mar 2017 09:55 )   PT: 11.9 sec;   INR: 1.08 ratio    cxr noted  PROCEDURE DATE:  03/29/2017        INTERPRETATION:  Single view chest    History CHF    Comparison 6 days ago    There is interval development of mild left retrocardiac volume loss   without layering effusion or licha central edema. The heart is normal in   size for projection.    IMPRESSION:    Retrocardiac opacity consistent with atelectasis or pneumonia     PTT - ( 21 Mar 2017 09:55 )  PTT:35.5 sec  INTERPRETATION:  Exam Date: 3/21/2017 7:33 AM    History: Back pain    Technique: Single frontal portable view of the chest with comparison to    10/20/2016    Findings:    Studies limited by rotation.    The heart is enlarged. No focal consolidation. The apices and   hemidiaphragms are unremarkable. Degenerative changes of the visualized   osseous structures.        Impression:EXAM:  CHEST SINGLE VIEW FRONTAL                            PROCEDURE DATE:  03/23/2017        INTERPRETATION:  Views:1  Comparison: 2 days ago  History: CHF    The lungs are clear of infiltrates and effusions.     The cardiac and   mediastinal contours appear unremarkable.     Impression:  1. No evidence of acute pulmonary disease.      Cardiomegaly          ABG - ( 23 Mar 2017 11:07 )  pH: 7.40  /  pCO2: 35    /  pO2: 109   / HCO3: 21    / Base Excess: -3    /  SaO2: 99            28 Mar 2017 06:03    149    |  117    |  82     ----------------------------<  125    4.3     |  24     |  1.98     Ca    7.8        28 Mar 2017 06:03                          8.0    x     )-----------( x        ( 31 Mar 2017 03:29 )             24.0       RADIOLOGY & ADDITIONAL STUDIES:

## 2017-04-03 LAB
ANION GAP SERPL CALC-SCNC: 9 MMOL/L — SIGNIFICANT CHANGE UP (ref 5–17)
BUN SERPL-MCNC: 62 MG/DL — HIGH (ref 7–23)
CALCIUM SERPL-MCNC: 7.7 MG/DL — LOW (ref 8.5–10.1)
CHLORIDE SERPL-SCNC: 113 MMOL/L — HIGH (ref 96–108)
CO2 SERPL-SCNC: 26 MMOL/L — SIGNIFICANT CHANGE UP (ref 22–31)
CREAT SERPL-MCNC: 1.99 MG/DL — HIGH (ref 0.5–1.3)
GLUCOSE SERPL-MCNC: 166 MG/DL — HIGH (ref 70–99)
HCT VFR BLD CALC: 25.6 % — LOW (ref 39–50)
HGB BLD-MCNC: 8.5 G/DL — LOW (ref 13–17)
POTASSIUM SERPL-MCNC: 3.8 MMOL/L — SIGNIFICANT CHANGE UP (ref 3.5–5.3)
POTASSIUM SERPL-SCNC: 3.8 MMOL/L — SIGNIFICANT CHANGE UP (ref 3.5–5.3)
SODIUM SERPL-SCNC: 148 MMOL/L — HIGH (ref 135–145)
SURGICAL PATHOLOGY FINAL REPORT - CH: SIGNIFICANT CHANGE UP

## 2017-04-03 PROCEDURE — 93970 EXTREMITY STUDY: CPT | Mod: 26

## 2017-04-03 RX ORDER — SODIUM FERRIC GLUCONAT/SUCROSE 62.5MG/5ML
125 AMPUL (ML) INTRAVENOUS AT BEDTIME
Qty: 0 | Refills: 0 | Status: DISCONTINUED | OUTPATIENT
Start: 2017-04-03 | End: 2017-04-05

## 2017-04-03 RX ORDER — PANTOPRAZOLE SODIUM 20 MG/1
40 TABLET, DELAYED RELEASE ORAL
Qty: 0 | Refills: 0 | Status: DISCONTINUED | OUTPATIENT
Start: 2017-04-03 | End: 2017-04-05

## 2017-04-03 RX ORDER — FUROSEMIDE 40 MG
40 TABLET ORAL DAILY
Qty: 0 | Refills: 0 | Status: DISCONTINUED | OUTPATIENT
Start: 2017-04-03 | End: 2017-04-05

## 2017-04-03 RX ADMIN — Medication 1 APPLICATION(S): at 06:07

## 2017-04-03 RX ADMIN — PRAMIPEXOLE DIHYDROCHLORIDE 0.12 MILLIGRAM(S): 0.12 TABLET ORAL at 06:12

## 2017-04-03 RX ADMIN — SIMVASTATIN 10 MILLIGRAM(S): 20 TABLET, FILM COATED ORAL at 22:19

## 2017-04-03 RX ADMIN — Medication 12.5 MILLIGRAM(S): at 06:07

## 2017-04-03 RX ADMIN — LORATADINE 10 MILLIGRAM(S): 10 TABLET ORAL at 12:06

## 2017-04-03 RX ADMIN — Medication 100 MILLIGRAM(S): at 12:06

## 2017-04-03 RX ADMIN — Medication 1 SPRAY(S): at 18:46

## 2017-04-03 RX ADMIN — Medication 240 MILLIGRAM(S): at 06:20

## 2017-04-03 RX ADMIN — Medication 12.5 MILLIGRAM(S): at 17:19

## 2017-04-03 RX ADMIN — Medication 325 MILLIGRAM(S): at 12:06

## 2017-04-03 RX ADMIN — ISOSORBIDE MONONITRATE 120 MILLIGRAM(S): 60 TABLET, EXTENDED RELEASE ORAL at 12:05

## 2017-04-03 RX ADMIN — Medication 145 MILLIGRAM(S): at 12:06

## 2017-04-03 RX ADMIN — Medication 220 MILLIGRAM(S): at 22:15

## 2017-04-03 RX ADMIN — Medication 0.5 MILLIGRAM(S): at 21:39

## 2017-04-03 RX ADMIN — Medication 1 SPRAY(S): at 06:06

## 2017-04-03 RX ADMIN — Medication 1 APPLICATION(S): at 17:29

## 2017-04-03 RX ADMIN — Medication 40 MILLIGRAM(S): at 06:12

## 2017-04-03 RX ADMIN — Medication 150 MILLIGRAM(S): at 17:19

## 2017-04-03 RX ADMIN — Medication 30 MILLILITER(S): at 18:07

## 2017-04-03 RX ADMIN — Medication 650 MILLIGRAM(S): at 22:34

## 2017-04-03 RX ADMIN — FINASTERIDE 5 MILLIGRAM(S): 5 TABLET, FILM COATED ORAL at 12:06

## 2017-04-03 RX ADMIN — GABAPENTIN 600 MILLIGRAM(S): 400 CAPSULE ORAL at 22:19

## 2017-04-03 RX ADMIN — PANTOPRAZOLE SODIUM 40 MILLIGRAM(S): 20 TABLET, DELAYED RELEASE ORAL at 18:07

## 2017-04-03 RX ADMIN — Medication 40 MILLIGRAM(S): at 17:17

## 2017-04-03 RX ADMIN — Medication 150 MILLIGRAM(S): at 06:12

## 2017-04-03 RX ADMIN — Medication 650 MILLIGRAM(S): at 17:20

## 2017-04-03 RX ADMIN — PRAMIPEXOLE DIHYDROCHLORIDE 0.12 MILLIGRAM(S): 0.12 TABLET ORAL at 17:17

## 2017-04-03 RX ADMIN — Medication 8 MILLIGRAM(S): at 22:19

## 2017-04-03 RX ADMIN — PRAMIPEXOLE DIHYDROCHLORIDE 0.12 MILLIGRAM(S): 0.12 TABLET ORAL at 22:19

## 2017-04-03 RX ADMIN — Medication 0.5 MILLIGRAM(S): at 07:44

## 2017-04-03 NOTE — PROGRESS NOTE ADULT - ASSESSMENT
1. Anemia- Management pre primary team and GI team. 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 D/w GI- pt stable since procedure and did not require transfusion. Pt now stable to restart  ASA 81mg tomorrow.     2. CAD, s/p PCI and atherectomy- OK to restart ASA tomorrow. Cont statin.     3. HTN- continue current meds.    4. Afib- currently in SR. Not on anticoagulation at this time. Per pulmonary team pt is not an optimal candidate for amiodarone since he has already low lung reserve. Not cleared for full LTA.    5. Mechanical DVT proph.

## 2017-04-03 NOTE — PROGRESS NOTE ADULT - ASSESSMENT
82 year old man w/ PMH of HTN, COPD on home O2 2L, SHAYY on nocturnal BIPAP, ex-smoker (smoked 1ppd X 50 years, quit 22 years ago),  Chronic diastolic CHF, CAD s/p PCI with stent in 2002, Last cath in july 2014 with RCA disease medically managed, Hyperlipidemia, PVD, Iron deficiency anemia,Chronic back pain, Gout, BPH, CKD III with baseline Cr 2.2,  PAF not on a/c, Hx of GI Bleed in the past 10/16 had sigmoidoscopy , hemorrhoids s/p banding, family hx of htn, dm, colon ca, now presenting with low hb. Patient states he went to his pcp and was found to have low hb and presented to ER. In Er he was found to have hb of 6.3.  Found to have bun/cr of 114/3.51.     1-Acute anemia ,  secondary to blood loss anemia  -s/p 11 units since admission.  Hgb steady 8.9-9.0  - EGD/colonscopy that revealed  non-bleeding ulcers (one with eschar) in stomach and colonic mass.   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 diet as per GI    2-CAD w/ recent stent placement  -cardio on board, off asa/plavix for now; consider starting asa 81 mg from 4/4    3-Chronic SHAYY and  copd  w/ chronic respiratory failure on home o2 and bipap nightly. c/w night bipap and supplemental o2    4- Acute renal failure on stage III CKD possibly secondary to ATN secondary to volume loss  -Baseline creatinine is 2.4 (2017).Admited with 3.51. Cr now improved to 1.91.nephrology services on board .hold lasix as per nephro or use intermittently    5- Acute back pain secondary to spinal arthropathy   MRI lumbar spine noted. -physical therapy for discharge. pain control    6-Paroxysmal atrial fibrillation.  Plan: currently in sinus rhythm on EKG . not on AC due to hx of GI bleed.     7-Chronic diastolic congestive heart failure. not in exacerbation    8-DVT proph- Left sided SCD only secondary to gi bleed and right leg DVT    9-mild hypernatremia- possible secondary to mild dehydration.promote oral fluid intake. -hold lasix - Renal is following     10 - b/l  chronic venous stasis dermatitis and ulcer on the right lower extremity anterior surface. PTA, wound care with Dr Genarro    -wound care recs: patient has been undergoing whirlpool treatment to both lower extremities to remove debris and assist with odor control. 1) Daily Whirlpool treatments with 2 drops of hibiclens 2) After whirlpool apply Xeroform, kerlix and ace bandage. 3) Elevate extremities off mattress. 4) Turn and position every 2 hours  -investigate surgical shoes for feet (ordered via nursing). currently wearing cut crocs."    11-lower extremity diabetic neuropahty. c/w gabapentin, acetaminophen. PRN oxycodone for severe pain    12-Essential hypertension: c/w current management BP stable over course of admission.     13-:Morbid Obesity :bMI >35 . nutrition consult appreciated    14) Right Post Tibial DVT: New,  Dx on 4/3/17; can't get AC, would get IR consult for IVC filter    Dispo: Pt could be sent to STR once IVC filter is placed in and No more GI bleed happens.   poc discussed with pt, family, team

## 2017-04-03 NOTE — PROGRESS NOTE ADULT - SUBJECTIVE AND OBJECTIVE BOX
NEPHROLOGY INTERVAL HPI/OVERNIGHT EVENTS:  Doing well  concerned about frequent BM  trying to maintain adequate po Intake  walked with PT today        MEDICATIONS  (STANDING):  finasteride 5milliGRAM(s) Oral daily  buDESOnide   0.5 milliGRAM(s) Respule 0.5milliGRAM(s) Nebulizer two times a day  doxazosin 8milliGRAM(s) Oral at bedtime  isosorbide   mononitrate ER Tablet (IMDUR) 120milliGRAM(s) Oral daily  gabapentin Oral Tab/Cap - Peds 600milliGRAM(s) Oral at bedtime  allopurinol 100milliGRAM(s) Oral daily  loratadine 10milliGRAM(s) Oral daily  fenofibrate Tablet 145milliGRAM(s) Oral daily  simvastatin 10milliGRAM(s) Oral at bedtime  pramipexole 0.125milliGRAM(s) Oral three times a day  metoprolol Oral Tab/Cap - Peds 12.5milliGRAM(s) Oral two times a day  hydrALAZINE 150milliGRAM(s) Oral two times a day  ammonium lactate 12% Lotion 1Application(s) Topical two times a day  diltiazem   CD 240milliGRAM(s) Oral daily  senna 2Tablet(s) Oral at bedtime  glycerin Suppository - Adult 1Suppository(s) Rectal daily  pantoprazole Infusion 8mG/Hr IV Continuous <Continuous>  fluticasone propionate 50 MICROgram(s)/spray Nasal Spray 1Spray(s) Both Nostrils two times a day  furosemide    Tablet 40milliGRAM(s) Oral two times a day  ferrous    sulfate 325milliGRAM(s) Oral daily    MEDICATIONS  (PRN):  nitroglycerin     SubLingual 0.4milliGRAM(s) SubLingual every 5 minutes PRN Chest Pain  ondansetron Injectable 4milliGRAM(s) IV Push every 6 hours PRN Nausea and/or Vomiting  diphenhydrAMINE   Elixir 12.5milliGRAM(s) Oral every 6 hours PRN Rash and/or Itching  oxyCODONE IR 5milliGRAM(s) Oral two times a day PRN Severe Pain (7 - 10)  acetaminophen   Tablet 650milliGRAM(s) Oral every 6 hours PRN For Temp greater than 38 C (100.4 F)      Allergies    No Known Allergies    Intolerances        I&O's Detail      Vital Signs Last 24 Hrs  T(C): 36.7, Max: 36.7 ( @ 19:57)  T(F): 98, Max: 98.1 ( @ 19:57)  HR: 93 (76 - 107)  BP: 148/35 (126/30 - 148/35)  BP(mean): --  RR: 18 (18 - 18)  SpO2: 99% (99% - 100%)  Daily     Daily Weight in k.6 (2017 08:40)    PHYSICAL EXAM:  General: alert. awake Ox3  HEENT: MMM  CV: s1s2 rrr  LUNGS: B/L CTA  EXT: LE dressing in place, no surrounding edema in the upper thighs    LABS:                        8.5    x     )-----------( x        ( 2017 06:58 )             25.6     2017 10:07    148    |  113    |  62     ----------------------------<  166    3.8     |  26     |  1.99     Ca    7.7        2017 10:07

## 2017-04-03 NOTE — PROGRESS NOTE ADULT - SUBJECTIVE AND OBJECTIVE BOX
Cardiology Progress Note:    HPI:  83 y/o male with PMHx of COPD, CHF, chronic renal failure, paroxysmal A-fib.with last hospitaliztion 10/2017 for hematochezia s/p bleeding scan and flex sigmoidoscopy  was BIBA for acute worsening lower back pain radiating to buttock to foot. Patient with amber boots for chronic venous stasis ulcers and lower extremity chronic edema ,daughter reports increased oozing from ulcers in the past few weeks, no fever.     3/25- No CP/SOB. No fevers. SR on tele.    3/26- Pt transfused another unit pRBCs yesterday- hgb 7.4 now. No CP/SOB. SR on tele.     3/27- No events last pm. No new complaints. No CP/SOB.     3/28- No CP/SOB. Hgb today 6.8. No new complaints. SR on tele.     3/30- Pt seen and examined by me today. he denies any symptoms today. Denies any SOB.    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 No CP/SOB. Pt feels well.     Family History: Father: rectal CA  Mother: HTN, DM  3 siblings: DM    Social History: ::  Tobacco: smoked 1ppd X 50 years, quit 22 years ago.  Rare ETOH use.  wife recently passed away last year (21 Mar 2017 06:35)    PAST MEDICAL & SURGICAL HISTORY:  Sepsis, due to unspecified organism: 2/2 poorly healing wounds b/l  BPH (benign prostatic hypertrophy)  Hyperlipemia  Coronary artery disease  Hypertension  Dyspepsia: On moderate exertion.  Sleep apnea, obstructive: Requires home 02 therapy, and treatment with BIPAP  Atelectasis  Pleural effusion, bilateral  Respiratory failure  Peripheral edema  CRI (chronic renal insufficiency)  Gout  CRF (chronic renal failure)  Benign prostatic hypertrophy  Spinal stenosis  Hypercholesterolemia  GERD (gastroesophageal reflux disease)  CAD (coronary artery disease)  Hypertension  S/P angioplasty with stent  Cataract of left eye  Prostate: Surgery green light procedure.  S/P rotator cuff surgery: Right  S/P angioplasty  Rotator cuff tear, right: repair    MEDICATIONS  (STANDING):  pantoprazole Infusion 8mG/Hr IV Continuous <Continuous>  finasteride 5milliGRAM(s) Oral daily  buDESOnide   0.5 milliGRAM(s) Respule 0.5milliGRAM(s) Nebulizer two times a day  aspirin enteric coated 81milliGRAM(s) Oral daily  doxazosin 8milliGRAM(s) Oral at bedtime  isosorbide   mononitrate ER Tablet (IMDUR) 120milliGRAM(s) Oral daily  diltiazem    Tablet 120milliGRAM(s) Oral two times a day  gabapentin Oral Tab/Cap - Peds 600milliGRAM(s) Oral at bedtime  gabapentin 300milliGRAM(s) Oral daily  allopurinol 100milliGRAM(s) Oral daily  loratadine 10milliGRAM(s) Oral daily  fenofibrate Tablet 145milliGRAM(s) Oral daily  simvastatin 10milliGRAM(s) Oral at bedtime  pramipexole 0.125milliGRAM(s) Oral three times a day  metoprolol Oral Tab/Cap - Peds 12.5milliGRAM(s) Oral two times a day  famotidine    Tablet 20milliGRAM(s) Oral at bedtime  ferrous    sulfate 325milliGRAM(s) Oral three times a day with meals  hydrALAZINE 150milliGRAM(s) Oral two times a day  pantoprazole    Tablet 40milliGRAM(s) Oral two times a day before meals  ammonium lactate 12% Lotion 1Application(s) Topical two times a day  heparin  Injectable 5000Unit(s) SubCutaneous every 8 hours    MEDICATIONS  (PRN):  nitroglycerin     SubLingual 0.4milliGRAM(s) SubLingual every 5 minutes PRN Chest Pain    Allergies  No Known Allergies    Intolerances    Vital Signs Last 24 Hrs  T(C): 36.6, Max: 36.7 (03-20 @ 22:39)  T(F): 97.9, Max: 98.1 (03-20 @ 22:39)  HR: 95 (90 - 112)  BP: 121/53 (121/53 - 140/63)  BP(mean): --  RR: 18 (16 - 18)  SpO2: 93% (93% - 100%)    REVIEW OF SYSTEMS:    CONSTITUTIONAL:  As per HPI.  HEENT:  Eyes:  No diplopia or blurred vision. ENT:  No earache, sore throat or runny nose.  CARDIOVASCULAR:  No pressure, squeezing, strangling, tightness, heaviness or aching about the chest, neck, axilla or epigastrium.  RESPIRATORY:  No cough, shortness of breath, PND or orthopnea.  GASTROINTESTINAL:  No nausea, vomiting or diarrhea.  GENITOURINARY:  No dysuria, frequency or urgency.  MUSCULOSKELETAL:  As per HPI.  SKIN:  No change in skin, hair or nails.  NEUROLOGIC:  No paresthesias, fasciculations, seizures or weakness.  PSYCHIATRIC:  No disorder of thought or mood.  ENDOCRINE:  No heat or cold intolerance, polyuria or polydipsia.  HEMATOLOGICAL:  No easy bruising or bleedings:     PHYSICAL EXAMINATION:    GENERAL APPEARANCE:  Pt. is not currently dyspneic, in no distress. Pt. is alert, oriented, and pleasant.  HEENT:  Pupils are normal and react normally. No icterus. Mucous membranes well colored.  NECK:  Supple. No lymphadenopathy. Jugular venous pressure not elevated. Carotids equal.   HEART:   The cardiac impulse has a normal quality. There are no murmurs, rubs or gallops noted  CHEST:  Chest is clear to auscultation. Normal respiratory effort.  ABDOMEN:  Soft and nontender.   EXTREMITIES:  There is no edema.   SKIN:  No rash or significant lesions are noted.    I&O's Summary    LABS:                        6.3    8.6   )-----------( 337      ( 20 Mar 2017 22:39 )             20.0   20 Mar 2017 22:39    147    |  114    |  114    ----------------------------<  124    4.4     |  23     |  3.51     Ca    8.1        20 Mar 2017 22:39    TPro  5.7    /  Alb  2.5    /  TBili  0.2    /  DBili  x      /  AST  19     /  ALT  14     /  AlkPhos  34     20 Mar 2017 22:39  LIVER FUNCTIONS - ( 20 Mar 2017 22:39 )  Alb: 2.5 g/dL / Pro: 5.7 gm/dL / ALK PHOS: 34 U/L / ALT: 14 U/L / AST: 19 U/L / GGT: x           PT/INR - ( 20 Mar 2017 22:39 )   PT: 11.3 sec;   INR: 1.03 ratio       PTT - ( 20 Mar 2017 22:39 )  PTT:33.6 secCARDIAC MARKERS ( 21 Mar 2017 03:42 )  0.018 ng/mL / x     / 107 U/L / x     / x        TELEMETRY: Normal sinus rhythm with no tachy or nelly events    RADIOLOGY- CXR- 3/23- [facility].    ASSESSMENT/PLAN-

## 2017-04-03 NOTE — PROGRESS NOTE ADULT - SUBJECTIVE AND OBJECTIVE BOX
Patient is a 64y old  Female who presents with a chief complaint of weakness (02 Apr 2017 18:48)      HPI:  Pt is a 65 yo woman with hx liver cirrhosis, variceal esophageal bvanding, RA on prednisone, who was admitted recently 2-29 to 3-3 for cirrhosis and vomiting.   She reports weakness and inability to walk (legs feel like jelly). She has been incontinent of urine and stool for a few weeks as  she does not make it to the bathroom in time.  Pt fell off the couch his morning.  She also had two other falls, including 3/3/17 getting out of car to home in driveway, and  last evening when she slid off couch, struck a hard box with left chest and twisted her left ankle.  Pt's significant other reports she is  unable to support her own weight and is having trouble with her balance.  She denies cough, resp symptoms.  No fever/chills, n/v, cpain, SOB.  She has 1-3 loose stools/day.   Dr. Chin discontinued her methotrexate, bystolic, and HCTZ on 3/10/17.            She  stopped drinking 5 days prior to last admission.  No head injury during any fall. (02 Apr 2017 18:48)      S/P over 10 URBC  has gastric ulcer, limited bx DW pt and daughter    bleeding duodenla diulafoy lesion  pt comf, neg N V  vida po  has fatigue and mild SOB, chronic  neg cp  mild lower abd cramping that improves with flatus  neg blood in stool        PAST MEDICAL & SURGICAL HISTORY:  HTN (hypertension)  Rheumatoid arthritis  No significant past surgical history      MEDICATIONS  (STANDING):  multivitamin 1Tablet(s) Oral daily  aspirin 325milliGRAM(s) Oral daily  predniSONE   Tablet 5milliGRAM(s) Oral daily  propranolol 10milliGRAM(s) Oral three times a day  potassium chloride    Tablet ER 10milliEquivalent(s) Oral daily  pantoprazole    Tablet 40milliGRAM(s) Oral before breakfast  furosemide    Tablet 20milliGRAM(s) Oral daily  spironolactone 50milliGRAM(s) Oral daily    MEDICATIONS  (PRN):      Allergies    sulfa drugs (Unknown)    Intolerances        SOCIAL HISTORY:unchanged    FAMILY HISTORY:  No pertinent family history in first degree relatives      REVIEW OF SYSTEMS:    CONSTITUTIONAL: No weakness, fevers or chills  EYES/ENT: No visual changes;  No vertigo or throat pain   NECK: No pain or stiffness  RESPIRATORY: No cough, wheezing, hemoptysis; No shortness of breath  CARDIOVASCULAR: No chest pain or palpitations  GENITOURINARY: No dysuria, frequency or hematuria  NEUROLOGICAL: No numbness or weakness  SKIN: No itching, burning, rashes, or lesions   All other review of systems is negative unless indicated above.    Vital Signs Last 24 Hrs  T(C): 36.7, Max: 36.7 (04-02 @ 18:27)  T(F): 98, Max: 98 (04-02 @ 18:27)  HR: 92 (78 - 100)  BP: 87/57 (87/57 - 109/77)  BP(mean): --  RR: 16 (16 - 19)  SpO2: 96% (96% - 99%)    PHYSICAL EXAM:    Constitutional: NAD, well-developed  HEENT: EOMI, throat clear  Neck: No LAD, supple  Respiratory: CTA and P  Cardiovascular: S1 and S2, RRR, no M  Gastrointestinal: BS+, soft, NT/ND, neg HSM,  Extremities: No peripheral edema, neg clubing, cyanosis  Vascular: 2+ peripheral pulses  Neurological: A/O x 3, no focal deficits  Psychiatric: Normal mood, normal affect  Skin: No rashes    LABS:  CBC Full  -  ( 02 Apr 2017 11:22 )  WBC Count : 10.1 K/uL  Hemoglobin : 11.6 g/dL  Hematocrit : 32.5 %  Platelet Count - Automated : 188 K/uL  Mean Cell Volume : 114.9 fl  Mean Cell Hemoglobin : 41.2 pg  Mean Cell Hemoglobin Concentration : 35.8 gm/dL  Auto Neutrophil # : 6.7 K/uL  Auto Lymphocyte # : 2.5 K/uL  Auto Monocyte # : 0.9 K/uL  Auto Eosinophil # : 0.0 K/uL  Auto Basophil # : 0.1 K/uL  Auto Neutrophil % : 66.0 %  Auto Lymphocyte % : 24.5 %  Auto Monocyte % : 8.7 %  Auto Eosinophil % : 0.2 %  Auto Basophil % : 0.6 %    03 Apr 2017 04:56    144    |  107    |  10     ----------------------------<  100    3.4     |  26     |  0.79     Ca    8.0        03 Apr 2017 04:56    TPro  7.9    /  Alb  2.2    /  TBili  2.6    /  DBili  x      /  AST  85     /  ALT  21     /  AlkPhos  141    02 Apr 2017 10:01    PT/INR - ( 02 Apr 2017 11:22 )   PT: 17.5 sec;   INR: 1.60 ratio         PTT - ( 02 Apr 2017 11:22 )  PTT:40.3 sec        RADIOLOGY & ADDITIONAL STUDIES:

## 2017-04-03 NOTE — PROGRESS NOTE ADULT - ASSESSMENT
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

## 2017-04-03 NOTE — PROGRESS NOTE ADULT - SUBJECTIVE AND OBJECTIVE BOX
4/2: no more bleeding    4/3:  no more gi bleed  c/o right calf pain      PHYSICAL EXAM:    Daily     Daily Weight in k.6 (2017 08:40)    ICU Vital Signs Last 24 Hrs  T(C): 36.7, Max: 36.7 ( @ 19:57)  T(F): 98, Max: 98.1 ( @ 19:57)  HR: 93 (76 - 107)  BP: 148/35 (126/30 - 148/35)  BP(mean): --  ABP: --  ABP(mean): --  RR: 18 (18 - 18)  SpO2: 99% (99% - 100%)      Constitutional: Weak appearing  HEENT: Atraumatic, DEBBIE, Normal, No congestion  Respiratory: Breath Sounds normal, no rhonchi/wheeze  Cardiovascular: N S1S2; BEN present  Gastrointestinal: Abdomen soft, non tender, Bowel Sounds present  Extremities: No edema, peripheral pulses present  Neurological: AAO x 3, no gross focal motor deficits  Skin: Non cellulitic, no rash, ulcers  Lymph Nodes: No lymphadenopathy noted  Back: No CVA tenderness   Musculoskeletal: non tender  Breasts: Deferred  Genitourinary: deferred  Rectal: Deferred                          8.5    x     )-----------( x        ( 2017 06:58 )             25.6       CBC Full  -  ( 2017 06:58 )  WBC Count : x  Hemoglobin : 8.5 g/dL  Hematocrit : 25.6 %  Platelet Count - Automated : x  Mean Cell Volume : x  Mean Cell Hemoglobin : x  Mean Cell Hemoglobin Concentration : x  Auto Neutrophil # : x  Auto Lymphocyte # : x  Auto Monocyte # : x  Auto Eosinophil # : x  Auto Basophil # : x  Auto Neutrophil % : x  Auto Lymphocyte % : x  Auto Monocyte % : x  Auto Eosinophil % : x  Auto Basophil % : x      2017 10:07    148    |  113    |  62     ----------------------------<  166    3.8     |  26     |  1.99     Ca    7.7        2017 10:07                              MEDICATIONS  (STANDING):  finasteride 5milliGRAM(s) Oral daily  buDESOnide   0.5 milliGRAM(s) Respule 0.5milliGRAM(s) Nebulizer two times a day  doxazosin 8milliGRAM(s) Oral at bedtime  isosorbide   mononitrate ER Tablet (IMDUR) 120milliGRAM(s) Oral daily  gabapentin Oral Tab/Cap - Peds 600milliGRAM(s) Oral at bedtime  allopurinol 100milliGRAM(s) Oral daily  loratadine 10milliGRAM(s) Oral daily  fenofibrate Tablet 145milliGRAM(s) Oral daily  simvastatin 10milliGRAM(s) Oral at bedtime  pramipexole 0.125milliGRAM(s) Oral three times a day  metoprolol Oral Tab/Cap - Peds 12.5milliGRAM(s) Oral two times a day  hydrALAZINE 150milliGRAM(s) Oral two times a day  ammonium lactate 12% Lotion 1Application(s) Topical two times a day  diltiazem   CD 240milliGRAM(s) Oral daily  senna 2Tablet(s) Oral at bedtime  glycerin Suppository - Adult 1Suppository(s) Rectal daily  pantoprazole Infusion 8mG/Hr IV Continuous <Continuous>  fluticasone propionate 50 MICROgram(s)/spray Nasal Spray 1Spray(s) Both Nostrils two times a day  furosemide    Tablet 40milliGRAM(s) Oral daily  sodium ferric gluconate complex IVPB 125milliGRAM(s) IV Intermittent at bedtime

## 2017-04-04 LAB
ANION GAP SERPL CALC-SCNC: 10 MMOL/L — SIGNIFICANT CHANGE UP (ref 5–17)
BUN SERPL-MCNC: 55 MG/DL — HIGH (ref 7–23)
CALCIUM SERPL-MCNC: 7.9 MG/DL — LOW (ref 8.5–10.1)
CHLORIDE SERPL-SCNC: 113 MMOL/L — HIGH (ref 96–108)
CO2 SERPL-SCNC: 27 MMOL/L — SIGNIFICANT CHANGE UP (ref 22–31)
CREAT SERPL-MCNC: 1.85 MG/DL — HIGH (ref 0.5–1.3)
GLUCOSE SERPL-MCNC: 94 MG/DL — SIGNIFICANT CHANGE UP (ref 70–99)
HCT VFR BLD CALC: 25.8 % — LOW (ref 39–50)
HGB BLD-MCNC: 8.5 G/DL — LOW (ref 13–17)
MCHC RBC-ENTMCNC: 30.5 PG — SIGNIFICANT CHANGE UP (ref 27–34)
MCHC RBC-ENTMCNC: 33 GM/DL — SIGNIFICANT CHANGE UP (ref 32–36)
MCV RBC AUTO: 92.4 FL — SIGNIFICANT CHANGE UP (ref 80–100)
PLATELET # BLD AUTO: 285 K/UL — SIGNIFICANT CHANGE UP (ref 150–400)
POTASSIUM SERPL-MCNC: 3.9 MMOL/L — SIGNIFICANT CHANGE UP (ref 3.5–5.3)
POTASSIUM SERPL-SCNC: 3.9 MMOL/L — SIGNIFICANT CHANGE UP (ref 3.5–5.3)
RBC # BLD: 2.79 M/UL — LOW (ref 4.2–5.8)
RBC # FLD: 15.6 % — HIGH (ref 10.3–14.5)
SODIUM SERPL-SCNC: 150 MMOL/L — HIGH (ref 135–145)
WBC # BLD: 6.4 K/UL — SIGNIFICANT CHANGE UP (ref 3.8–10.5)
WBC # FLD AUTO: 6.4 K/UL — SIGNIFICANT CHANGE UP (ref 3.8–10.5)

## 2017-04-04 PROCEDURE — 37191 INS ENDOVAS VENA CAVA FILTR: CPT

## 2017-04-04 PROCEDURE — 93971 EXTREMITY STUDY: CPT | Mod: 26,RT

## 2017-04-04 RX ORDER — SODIUM CHLORIDE 9 MG/ML
1000 INJECTION INTRAMUSCULAR; INTRAVENOUS; SUBCUTANEOUS
Qty: 0 | Refills: 0 | Status: DISCONTINUED | OUTPATIENT
Start: 2017-04-04 | End: 2017-04-04

## 2017-04-04 RX ORDER — ASPIRIN/CALCIUM CARB/MAGNESIUM 324 MG
81 TABLET ORAL DAILY
Qty: 0 | Refills: 0 | Status: DISCONTINUED | OUTPATIENT
Start: 2017-04-05 | End: 2017-04-05

## 2017-04-04 RX ORDER — SIMETHICONE 80 MG/1
80 TABLET, CHEWABLE ORAL
Qty: 0 | Refills: 0 | Status: DISCONTINUED | OUTPATIENT
Start: 2017-04-04 | End: 2017-04-05

## 2017-04-04 RX ADMIN — Medication 40 MILLIGRAM(S): at 05:58

## 2017-04-04 RX ADMIN — Medication 8 MILLIGRAM(S): at 21:32

## 2017-04-04 RX ADMIN — Medication 100 MILLIGRAM(S): at 12:21

## 2017-04-04 RX ADMIN — FINASTERIDE 5 MILLIGRAM(S): 5 TABLET, FILM COATED ORAL at 12:21

## 2017-04-04 RX ADMIN — SENNA PLUS 2 TABLET(S): 8.6 TABLET ORAL at 21:32

## 2017-04-04 RX ADMIN — Medication 1 APPLICATION(S): at 05:57

## 2017-04-04 RX ADMIN — Medication 0.5 MILLIGRAM(S): at 08:00

## 2017-04-04 RX ADMIN — PANTOPRAZOLE SODIUM 40 MILLIGRAM(S): 20 TABLET, DELAYED RELEASE ORAL at 05:58

## 2017-04-04 RX ADMIN — PRAMIPEXOLE DIHYDROCHLORIDE 0.12 MILLIGRAM(S): 0.12 TABLET ORAL at 21:32

## 2017-04-04 RX ADMIN — Medication 12.5 MILLIGRAM(S): at 05:58

## 2017-04-04 RX ADMIN — Medication 1 SUPPOSITORY(S): at 12:22

## 2017-04-04 RX ADMIN — Medication 650 MILLIGRAM(S): at 23:28

## 2017-04-04 RX ADMIN — Medication 150 MILLIGRAM(S): at 05:58

## 2017-04-04 RX ADMIN — Medication 220 MILLIGRAM(S): at 21:34

## 2017-04-04 RX ADMIN — PANTOPRAZOLE SODIUM 40 MILLIGRAM(S): 20 TABLET, DELAYED RELEASE ORAL at 18:41

## 2017-04-04 RX ADMIN — SIMVASTATIN 10 MILLIGRAM(S): 20 TABLET, FILM COATED ORAL at 21:32

## 2017-04-04 RX ADMIN — Medication 0.5 MILLIGRAM(S): at 19:33

## 2017-04-04 RX ADMIN — ISOSORBIDE MONONITRATE 120 MILLIGRAM(S): 60 TABLET, EXTENDED RELEASE ORAL at 12:21

## 2017-04-04 RX ADMIN — SIMETHICONE 80 MILLIGRAM(S): 80 TABLET, CHEWABLE ORAL at 18:41

## 2017-04-04 RX ADMIN — Medication 240 MILLIGRAM(S): at 05:57

## 2017-04-04 RX ADMIN — Medication 1 SPRAY(S): at 18:40

## 2017-04-04 RX ADMIN — Medication 1 APPLICATION(S): at 18:39

## 2017-04-04 RX ADMIN — PRAMIPEXOLE DIHYDROCHLORIDE 0.12 MILLIGRAM(S): 0.12 TABLET ORAL at 15:32

## 2017-04-04 RX ADMIN — GABAPENTIN 600 MILLIGRAM(S): 400 CAPSULE ORAL at 21:32

## 2017-04-04 RX ADMIN — Medication 1 SPRAY(S): at 05:56

## 2017-04-04 RX ADMIN — Medication 145 MILLIGRAM(S): at 12:21

## 2017-04-04 RX ADMIN — PRAMIPEXOLE DIHYDROCHLORIDE 0.12 MILLIGRAM(S): 0.12 TABLET ORAL at 05:58

## 2017-04-04 RX ADMIN — Medication 12.5 MILLIGRAM(S): at 18:41

## 2017-04-04 RX ADMIN — LORATADINE 10 MILLIGRAM(S): 10 TABLET ORAL at 12:21

## 2017-04-04 RX ADMIN — Medication 150 MILLIGRAM(S): at 18:42

## 2017-04-04 NOTE — PROGRESS NOTE ADULT - SUBJECTIVE AND OBJECTIVE BOX
4/4 - pt seen and examined, reports pain and swelling in RUE, denies CP, cough, abdominal pain, rectal bleeding or melena, has right heel pain , LE dressing in place    Vital Signs Last 24 Hrs  T(C): 36.4, Max: 37.6 (04-04 @ 10:30)  T(F): 97.5, Max: 99.6 (04-04 @ 10:30)  HR: 73 (69 - 100)  BP: 157/45 (104/50 - 173/47)  BP(mean): --  RR: 17 (12 - 20)  SpO2: 95% (79% - 100%)    PHYSICAL EXAM:    Constitutional: Weak appearing, walked 5 ft on 4/3/17  HEENT: Atraumatic, DEBBIE, Normal, No congestion  Respiratory: Breath Sounds normal, no rhonchi/wheeze  Cardiovascular: N S1S2; BEN present  Gastrointestinal: Abdomen soft, non tender, distended, Bowel Sounds present  Extremities: +2 edema, peripheral pulses present, LE dressing B/L, RUE induration focal erythema  Neurological: AAO x 3, no gross focal motor deficits  Skin: Non cellulitic, no rash, multiple venous ulcers dressing d/c/i  Lymph Nodes: No lymphadenopathy noted  Back: No CVA tenderness   Musculoskeletal: non tender  Breasts: Deferred  Genitourinary: deferred  Rectal: Deferred    04 Apr 2017 06:58    150    |  113    |  55     ----------------------------<  94     3.9     |  27     |  1.85     Ca    7.9        04 Apr 2017 06:58                          8.5    6.4   )-----------( 285      ( 04 Apr 2017 06:58 )             25.8                        8.5    x     )-----------( x        ( 03 Apr 2017 06:58 )             25.6     03 Apr 2017 10:07    148    |  113    |  62     ----------------------------<  166    3.8     |  26     |  1.99     Ca    7.7        03 Apr 2017 10:07      MEDICATIONS  (STANDING):  finasteride 5milliGRAM(s) Oral daily  buDESOnide   0.5 milliGRAM(s) Respule 0.5milliGRAM(s) Nebulizer two times a day  doxazosin 8milliGRAM(s) Oral at bedtime  isosorbide   mononitrate ER Tablet (IMDUR) 120milliGRAM(s) Oral daily  gabapentin Oral Tab/Cap - Peds 600milliGRAM(s) Oral at bedtime  allopurinol 100milliGRAM(s) Oral daily  loratadine 10milliGRAM(s) Oral daily  fenofibrate Tablet 145milliGRAM(s) Oral daily  simvastatin 10milliGRAM(s) Oral at bedtime  pramipexole 0.125milliGRAM(s) Oral three times a day  metoprolol Oral Tab/Cap - Peds 12.5milliGRAM(s) Oral two times a day  hydrALAZINE 150milliGRAM(s) Oral two times a day  ammonium lactate 12% Lotion 1Application(s) Topical two times a day  diltiazem   CD 240milliGRAM(s) Oral daily  senna 2Tablet(s) Oral at bedtime  glycerin Suppository - Adult 1Suppository(s) Rectal daily  fluticasone propionate 50 MICROgram(s)/spray Nasal Spray 1Spray(s) Both Nostrils two times a day  furosemide    Tablet 40milliGRAM(s) Oral daily  sodium ferric gluconate complex IVPB 125milliGRAM(s) IV Intermittent at bedtime  pantoprazole    Tablet 40milliGRAM(s) Oral two times a day before meals  simethicone 80milliGRAM(s) Chew two times a day    MEDICATIONS  (PRN):  nitroglycerin     SubLingual 0.4milliGRAM(s) SubLingual every 5 minutes PRN Chest Pain  ondansetron Injectable 4milliGRAM(s) IV Push every 6 hours PRN Nausea and/or Vomiting  diphenhydrAMINE   Elixir 12.5milliGRAM(s) Oral every 6 hours PRN Rash and/or Itching  oxyCODONE IR 5milliGRAM(s) Oral two times a day PRN Severe Pain (7 - 10)  acetaminophen   Tablet 650milliGRAM(s) Oral every 6 hours PRN For Temp greater than 38 C (100.4 F)  aluminum hydroxide/magnesium hydroxide/simethicone Suspension 30milliLiter(s) Oral every 6 hours PRN Dyspepsia    US DPLX UPR EXT VEINS LTD RT      04/04/2017    Positive for deep vein thrombus within the basilic vein in   the forearm. There are additional focal areas of noncompressibility and   poor vascular flow in the right cephalic vein of the mid upper arm and   the distal right cephalic vein at the wrist, compatible with focal   superficial vein thrombi.   US DPLX LWR EXT VEINS COMPL BI     04/03/2017    near occlusive thrombus in the RIGHT posterior tibial vein     CHEST SINGLE VIEW FRONTAL      03/31/2017  No active pulmonary disease. improved aeration of the LEFT   lower lobe with resolution of previously noted atelectasis    LUMBAR SPINE(MRI)W O CON      03/21/2017   Mild disc degeneration at L2-L3 through L4-5 with bulges at   these levels which flatten the ventral thecal sac and narrow the   BILATERAL neural foramina. Facet osteophytic hypertrophy is noted at C   L3-4 through L5-S1 with multiple posterior projecting synovial cyst   involving the LEFT L3-4, BILATERAL L4-5 and BILATERAL L5-S1 facet joints.    Upper GI endoscopy3/31/2017 7:35 AMDiffuse candidiasis was found in the entire esophagus.Few non-bleeding cratered gastric ulcers with no stigmata of bleeding were found in the gastric fundus. Biopsies were taken with a cold  forceps for histology. For hemostasis, one hemostatic clip was successfully placed (MR conditional). There was no bleeding at the endof the procedure.Diulafoy lesion in D4, cauterized with gold probe and then clipped times 2, tattoo placed 1 inch distal to itlimited G bx due to bleed from site of bx and needed clipcandida in E not biopsied Impression:          - Monilial esophagitis. Non-bleeding gastric ulcers with no stigmata of  bleeding. Biopsied. Clip (MR conditional) was placed.    Upper GI endoscopy3/24/2017 7:32 AMErythematous mucosa in the antrumBiopsied. Non-bleeding gastric ulcers with pigmented material. Mucosal variant in the duodenum. Biopsied.    Colonoscopy3/24/2017 7:32 AM Diverticulosis in the sigmoid colon and in the descending colon. Internal hemorrhoids. Tumor in the transverse colon. Removal was not done. No specimens collected.      Surgical Pathology Final Report -  (03.24.17 @ 11:33)    Surgical Pathology Final Report - :   ACCESSION No:  [id number]    ELDA MURRAY P                         1        Surgical Final Report          Final Diagnosis    1.  STOMACH, ANTRUM/GREATER CURVATURE, BIOPSY:  - BENIGN GASTRIC MUCOSA WITH MINIMAL CHRONIC INFLAMMATION  -  A DIFF-QUIK STAIN FOR HELICOBACTER PYLORI IS NEGATIVE    2.  DUODENAL BULB AND SECOND PORTION DUODENUM, BIOPSY:  - BENIGN DUODENAL MUCOSA WITH HEMOSIDERIN PIGMENT,  CONSISTENT WITH PRIOR BLEEDING  - VILLOUS ARCHITECTURE IS PRESERVED    ADRI VALDEZ M.D.  (Electronic Signature)  Reported on: 03/29/17    Comment  2.  An iron stain is positive, confirming the diagnosis.    2. All or portions of this case have been reviewed at intra-  departmental  conference.    (1, 2, 3)    Clinical Information  Gastrointestinal bleed, gastric ulcer, hyperpigmented lesion in  duodenal bulb and second portion of duodenum, hemorrhoids,  diverticulosis, submucosal lesion transverse colon; r/o H. pylori    Specimen Description  1     Bxs antral greater curvature  2     Bxs hyperpigmented lesion duodenal bulb and second portion  of duodenum    Gross Description  1.  Received in formalin labeled with the patient's name and  "biopsy antral greater curvature" is a 0.4 cm soft tan tissue  fragment.  Entirely submitted in one cassette.    2.  Received in formalin labeled with the patient's name and  "biopsy hyperpigmented lesion in duodenal bulb and second portion  of duodenum" is a 0.2 cm tan-brown tissue fragment.  Entirely  submitted in one cassette.  KB 03/24/17 13:37       Surgical Pathology Final Report -  (03.31.17 @ 11:52)    Surgical Pathology Final Report - :   ACCESSION No:  [id number]    ELDA MURRAY P                         1    Surgical Final Report    Final Diagnosis    STOMACH, BIOPSY:  - UNREMARKABLE GASTRIC MUCOSA  -  A DIFF-QUIK STAIN FOR HELICOBACTER PYLORI IS NEGATIVE    ADRI VALDEZ M.D.  (Electronic Signature)  Reported on: 04/03/17

## 2017-04-04 NOTE — PROGRESS NOTE ADULT - SUBJECTIVE AND OBJECTIVE BOX
Patient is a 82y old  Male who presents with a chief complaint of left leg pain radiating from left buttoch and thigh (21 Mar 2017 06:35)      HPI:  HPI: :: 82 y/o male with PMHx of COPD, CHF, chronic renal failure, paroxysmal A-fib.with last hospitaliztion 10/2017 for hematochezia s/p bleeding scan and flex sigmoidoscopy  was BIBA for acute worsening lower back pain radiating to buttock to foot   patient with amber boots for chronic venous stasis ulcers and lower extremity chronic edema ,daughter reports increased oozing from ulcers in lai past few weeks,no fever  In Ed patient noted to have Hb 6 and stool guaiac positive  and rectal exam- with cookie  done by ED physician as per note   Patient denies any chest pain or worsening of SOB ,no abdominal pain or vomiting ,no diarrhea,no efver or chills        Had inc LE pain yesterday and doppplers done  neg abd pain  vida po  neg vomiting  neg CP   sob unchanged and mild  PMHx: ::  HTN,  COPD on home O2 2L,  SHAYY on nocturnal BIPAP,  Chronic diastolic CHF,  CAD s/p PCI with stent in 2002,  Last cath in july 2014 with RCA disease medically managed, Hyperlipidemia,  PVD,  Iron deficiency anemia,  Chronic back pain,  Gout,  BPH,  CKD III with baseline Cr 2.2,  PAF not on a/c,  Hx of GI Bleed in the past, hemorrhoids s/p banding,  PSHx: ::  Right rotator cuff repair  pilonidal cyst  Family History: ::  Father: rectal CA  Mother: HTN, DM  3 siblings: DM  Social History: ::  Tobacco: smoked 1ppd X 50 years, quit 22 years ago.  Rare ETOH use.  wife recently passed away last year (21 Mar 2017 06:35)      PAST MEDICAL & SURGICAL HISTORY:  Sepsis, due to unspecified organism: 2/2 poorly healing wounds b/l  BPH (benign prostatic hypertrophy)  Hyperlipemia  Coronary artery disease  Hypertension  Dyspepsia: On moderate exertion.  Sleep apnea, obstructive: Requires home 02 therapy, and treatment with BIPAP  Atelectasis  Pleural effusion, bilateral  Respiratory failure  Peripheral edema  CRI (chronic renal insufficiency)  Gout  CRF (chronic renal failure)  Benign prostatic hypertrophy  Spinal stenosis  Hypercholesterolemia  GERD (gastroesophageal reflux disease)  CAD (coronary artery disease)  Hypertension  S/P angioplasty with stent  Cataract of left eye  Prostate: Surgery green light procedure.  S/P rotator cuff surgery: Right  S/P angioplasty  Rotator cuff tear, right: repair      MEDICATIONS  (STANDING):  finasteride 5milliGRAM(s) Oral daily  buDESOnide   0.5 milliGRAM(s) Respule 0.5milliGRAM(s) Nebulizer two times a day  doxazosin 8milliGRAM(s) Oral at bedtime  isosorbide   mononitrate ER Tablet (IMDUR) 120milliGRAM(s) Oral daily  gabapentin Oral Tab/Cap - Peds 600milliGRAM(s) Oral at bedtime  allopurinol 100milliGRAM(s) Oral daily  loratadine 10milliGRAM(s) Oral daily  fenofibrate Tablet 145milliGRAM(s) Oral daily  simvastatin 10milliGRAM(s) Oral at bedtime  pramipexole 0.125milliGRAM(s) Oral three times a day  metoprolol Oral Tab/Cap - Peds 12.5milliGRAM(s) Oral two times a day  hydrALAZINE 150milliGRAM(s) Oral two times a day  ammonium lactate 12% Lotion 1Application(s) Topical two times a day  diltiazem   CD 240milliGRAM(s) Oral daily  senna 2Tablet(s) Oral at bedtime  glycerin Suppository - Adult 1Suppository(s) Rectal daily  fluticasone propionate 50 MICROgram(s)/spray Nasal Spray 1Spray(s) Both Nostrils two times a day  furosemide    Tablet 40milliGRAM(s) Oral daily  sodium ferric gluconate complex IVPB 125milliGRAM(s) IV Intermittent at bedtime  pantoprazole    Tablet 40milliGRAM(s) Oral two times a day before meals    MEDICATIONS  (PRN):  nitroglycerin     SubLingual 0.4milliGRAM(s) SubLingual every 5 minutes PRN Chest Pain  ondansetron Injectable 4milliGRAM(s) IV Push every 6 hours PRN Nausea and/or Vomiting  diphenhydrAMINE   Elixir 12.5milliGRAM(s) Oral every 6 hours PRN Rash and/or Itching  oxyCODONE IR 5milliGRAM(s) Oral two times a day PRN Severe Pain (7 - 10)  acetaminophen   Tablet 650milliGRAM(s) Oral every 6 hours PRN For Temp greater than 38 C (100.4 F)  aluminum hydroxide/magnesium hydroxide/simethicone Suspension 30milliLiter(s) Oral every 6 hours PRN Dyspepsia      Allergies    No Known Allergies    Intolerances        SOCIAL HISTORY:unchanged    FAMILY HISTORY:  No pertinent family history in first degree relatives      REVIEW OF SYSTEMS:    CONSTITUTIONAL: No weakness, fevers or chills  EYES/ENT: No visual changes;  No vertigo or throat pain   NECK: No pain or stiffness  RESPIRATORY: No cough, wheezing, hemoptysis; No shortness of breath  CARDIOVASCULAR: No chest pain or palpitations  GENITOURINARY: No dysuria, frequency or hematuria  NEUROLOGICAL: No numbness or weakness  SKIN: No itching, burning, rashes, or lesions   All other review of systems is negative unless indicated above.    Vital Signs Last 24 Hrs  T(C): 37.1, Max: 37.1 (04-04 @ 04:14)  T(F): 98.7, Max: 98.7 (04-04 @ 04:14)  HR: 74 (74 - 100)  BP: 173/47 (104/50 - 173/47)  BP(mean): --  RR: 18 (18 - 18)  SpO2: 79% (79% - 100%)    PHYSICAL EXAM:    Constitutional: NAD, well-developed  HEENT: EOMI, throat clear  Neck: No LAD, supple  Respiratory: CTA and P  Cardiovascular: S1 and S2, RRR, no M  Gastrointestinal: BS+, soft, NT/ND, neg HSM,  Extremities: pos peripheral edema, neg clubing, cyanosis, dressing on LE    Neurological: A/O x 3, no focal deficits  Psychiatric: Normal mood, normal affect  Skin: No rashes    LABS:  CBC Full  -  ( 04 Apr 2017 06:58 )  WBC Count : 6.4 K/uL  Hemoglobin : 8.5 g/dL  Hematocrit : 25.8 %  Platelet Count - Automated : 285 K/uL  Mean Cell Volume : 92.4 fl  Mean Cell Hemoglobin : 30.5 pg  Mean Cell Hemoglobin Concentration : 33.0 gm/dL  Auto Neutrophil # : x  Auto Lymphocyte # : x  Auto Monocyte # : x  Auto Eosinophil # : x  Auto Basophil # : x  Auto Neutrophil % : x  Auto Lymphocyte % : x  Auto Monocyte % : x  Auto Eosinophil % : x  Auto Basophil % : x    03 Apr 2017 10:07    148    |  113    |  62     ----------------------------<  166    3.8     |  26     |  1.99     Ca    7.7        03 Apr 2017 10:07              RADIOLOGY & ADDITIONAL STUDIES:EXAM:  US DPLX LWR EXT VEINS COMPL BI                            PROCEDURE DATE:  04/03/2017        INTERPRETATION:      Ultrasound of the right and left lower extremity deep venous system         CLINICAL INFORMATION:      Pain and swelling, deep venous thrombosis.    TECHNIQUE:   Doppler ultrasonography of the bilateral lower extremity   deep venous system was performed.  The veins evaluated included the   common femoral vein, the inflow of the greater saphenous vein, the   superficial femoral vein,  the popliteal vein and the posterior tibial   vein in the proximal calf.      FINDINGS:    No previous examinations are available for review.    The bilateral lower extremity deep venous system demonstrated no   abnormality  with the exception of near occlusive thrombus in the RIGHT   posterior tibial vein..  The veins were patent with intact Doppler flow.     This flow varied with respiration.   Flow augmented with ipsilateral   calf compression. On transverse and longitudinal images no intraluminal   thrombus was found.  The veins were directly compressible by the   ultrasound transducer.            IMPRESSION:   near occlusive thrombus in the RIGHT posterior tibial vein   Critical value:  I discussed the finding of this report withDr. Lee   at 1:40 PM on 4/3/2017.  Critical value policy of the hospital was   followed.  Read back and confirmation of receipt of this communication   was performed.  This verbal communication supplements the text report of   this document.                COCO WYNN M.D., ATTENDING RADIOLOGIST  This document has been electronically signed. Apr  3 2017  1:55PM

## 2017-04-04 NOTE — PROGRESS NOTE ADULT - ASSESSMENT
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

## 2017-04-04 NOTE — PROGRESS NOTE ADULT - ASSESSMENT
82 y/o male with PMHx of COPD, CHF, chronic renal failure, paroxysmal A-fib.with last hospitaliztion 10/2017 for hematochezia s/p bleeding scan and flex sigmoidoscopy  was BIBA  now being worked up for GI bleed and has required multiple 4-5 units PRBCs done  now needs eval for upper and likley lower endoscopy  Chronic hypoxemic resp failure  CHF appears stable  P Afib now in nsr  hemodynamically stabe  SHAYY / OHS /COPD-overlap syndrome  suggest ABG and repeat cxr today to complete her workup  I've explained to pt and his DTR, critical care RN that pt is at incresaed risk of resp failure requiring mechanical ventilation with sedation, but his pulm status appears optimized presently for this indicated procedure with recent requirement to get muliple prbcs over last few days  recommend  ABG noted  cxr is clear  cont nocturnal BIPAP  Anesthesia veal before his procedure  cardiac eval in progress  will check outpt pfts, placed in chart  ABG noted  FEV1 6/2016 1.38, 68% predicted  DLCO 45% predicted  supportive care  BIPAP 12/8 with o2 2 liters attached  plan as per GI  cont current rx  supportive care required  Add flonase nasal spray   all recent data discussed with pt today 3/28  case discussed with Dr Hernández today  GI eval in progress for anemia  check repeat cxr today  may need additional endoscopy for anemia  hemodynamically stable  retrocardiac opacity likley atelectasis but ceratinly at increased risk of pneumonia  encourage incentive spirometry  monitor wbc and temps  duoneb with nebulizer rx  fu cxr noted  fu HCT  Dc planning as per GI  newly diagnosed right leg DVT with recent GI bleed and multiple blood transfusions  now scheduled for IVC filter insertion  stable resp status

## 2017-04-04 NOTE — PROGRESS NOTE ADULT - SUBJECTIVE AND OBJECTIVE BOX
Patient is a 82y old  Male who presents with a chief complaint of left leg pain radiating from left buttoch and thigh (21 Mar 2017 06:35)      HPI:  HPI: :: 82 y/o male with PMHx of COPD, CHF, chronic renal failure, paroxysmal A-fib.with last hospitaliztion 10/2017 for hematochezia s/p bleeding scan and flex sigmoidoscopy  was BIBA for acute worsening lower back pain radiating to buttock to foot   patient with amber boots for chronic venous stasis ulcers and lower extremity chronic edema ,daughter reports increased oozing from ulcers in lai past few weeks,no fever  In Ed patient noted to have Hb 6 and stool guaiac positive  and rectal exam- with cookie  done by ED physician as per note   Patient denies any chest pain or worsening of SOB ,no abdominal pain or vomiting ,no diarrhea,no efver or chills  seen and evaluated along with Dr SANTIZO  His dtr is contacted as well    all his recent data and PFT data obtained and discussed with his cardiologist today 3/24  pt is scheduled for his GI workup today    3/27  pt is seen and examined and feels better  asks for food  no further transfusions required  no distress  tolerated procedure well  no co  no sob  3/28  asks about going home  some nasal congestion  no co  no difficulty bretahing  uses bipap at night  3/29  feels well this am  smiling  no dyspnea  nasal congestion improved with nasal sparys use  3/30  no acute distress  getting cleaned this am while in bed  bilateral leg ulcers noted  3/31  going to repeat endoscopy this am after bleeding scan noted  dtrs at bedside  no resp issues reported at all  feels ok  no cp or sob at all  4/2  feels better overall  no cp  smioling  having bkfst without o2 this am  4/4  feel wilian  recent data reveiwed  no pleuritic pain  seen on 5 [place]  PMHx: ::  HTN,  COPD on home O2 2L,  SHAYY on nocturnal BIPAP,  Chronic diastolic CHF,  CAD s/p PCI with stent in 2002,  Last cath in july 2014 with RCA disease medically managed, Hyperlipidemia,  PVD,  Iron deficiency anemia,  Chronic back pain,  Gout,  BPH,  CKD III with baseline Cr 2.2,  PAF not on a/c,  Hx of GI Bleed in the past, hemorrhoids s/p banding,  PSHx: ::  Right rotator cuff repair  pilonidal cyst  Family History: ::  Father: rectal CA  Mother: HTN, DM  3 siblings: DM  Social History: ::  Tobacco: smoked 1ppd X 50 years, quit 22 years ago.  Rare ETOH use.  wife recently passed away last year (21 Mar 2017 06:35)      PAST MEDICAL & SURGICAL HISTORY:  Sepsis, due to unspecified organism: 2/2 poorly healing wounds b/l  BPH (benign prostatic hypertrophy)  Hyperlipemia  Coronary artery disease  Hypertension  Dyspepsia: On moderate exertion.  Sleep apnea, obstructive: Requires home 02 therapy, and treatment with BIPAP  Atelectasis  Pleural effusion, bilateral  Respiratory failure  Peripheral edema  CRI (chronic renal insufficiency)  Gout  CRF (chronic renal failure)  Benign prostatic hypertrophy  Spinal stenosis  Hypercholesterolemia  GERD (gastroesophageal reflux disease)  CAD (coronary artery disease)  Hypertension  S/P angioplasty with stent  Cataract of left eye  Prostate: Surgery green light procedure.  S/P rotator cuff surgery: Right  S/P angioplasty  Rotator cuff tear, right: repair          MEDICATIONS  (STANDING):  finasteride 5milliGRAM(s) Oral daily  buDESOnide   0.5 milliGRAM(s) Respule 0.5milliGRAM(s) Nebulizer two times a day  doxazosin 8milliGRAM(s) Oral at bedtime  isosorbide   mononitrate ER Tablet (IMDUR) 120milliGRAM(s) Oral daily  gabapentin Oral Tab/Cap - Peds 600milliGRAM(s) Oral at bedtime  allopurinol 100milliGRAM(s) Oral daily  loratadine 10milliGRAM(s) Oral daily  fenofibrate Tablet 145milliGRAM(s) Oral daily  simvastatin 10milliGRAM(s) Oral at bedtime  pramipexole 0.125milliGRAM(s) Oral three times a day  metoprolol Oral Tab/Cap - Peds 12.5milliGRAM(s) Oral two times a day  hydrALAZINE 150milliGRAM(s) Oral two times a day  ammonium lactate 12% Lotion 1Application(s) Topical two times a day  diltiazem   CD 240milliGRAM(s) Oral daily  senna 2Tablet(s) Oral at bedtime  glycerin Suppository - Adult 1Suppository(s) Rectal daily  fluticasone propionate 50 MICROgram(s)/spray Nasal Spray 1Spray(s) Both Nostrils two times a day  furosemide    Tablet 40milliGRAM(s) Oral daily  sodium ferric gluconate complex IVPB 125milliGRAM(s) IV Intermittent at bedtime  pantoprazole    Tablet 40milliGRAM(s) Oral two times a day before meals    MEDICATIONS  (PRN):  nitroglycerin     SubLingual 0.4milliGRAM(s) SubLingual every 5 minutes PRN Chest Pain  ondansetron Injectable 4milliGRAM(s) IV Push every 6 hours PRN Nausea and/or Vomiting  diphenhydrAMINE   Elixir 12.5milliGRAM(s) Oral every 6 hours PRN Rash and/or Itching  oxyCODONE IR 5milliGRAM(s) Oral two times a day PRN Severe Pain (7 - 10)  acetaminophen   Tablet 650milliGRAM(s) Oral every 6 hours PRN For Temp greater than 38 C (100.4 F)  aluminum hydroxide/magnesium hydroxide/simethicone Suspension 30milliLiter(s) Oral every 6 hours PRN Dyspepsia          FAMILY HISTORY:  No pertinent family history in first degree relatives          REVIEW OF SYSTEM:  Pertinent items are noted in HPI.  Constitutional negative for chills, fevers, sweats and weight loss  throat, and face:  negative for epistaxis, nasal congestion, sore throat and   tinnitus  Respiratory: negative for cough,pos  dyspnea on exertion,no pleuritic chest pain  and wheezing  Cardiovascular:  negative for chest pain, dyspnea and palpitations  Gastrointestinal: negative for abdominal pain, diarrhea, nausea and vomiting  Genitourinary: negative for dysuria, frequency and urinary incontinence  Skin:  negative for redness, rash, pruritus, swelling, dryness and   fissures  Hematologic/lymphatic: negative for bleeding and easy bruising  Musculoskeletal: negative for arthralgias, back pain and muscle weakness  Neurological: negative for dizziness, headaches, seizures and tremors  Behavioral/Psych:  negative for mood change, depression, suicidal attempts    Allergic/Immunologic: negative for anaphylaxis, angioedema and urticaria    Vital Signs Last 24 Hrs  T(C): 37.1, Max: 37.1 (04-04 @ 04:14)  T(F): 98.7, Max: 98.7 (04-04 @ 04:14)  HR: 74 (74 - 100)  BP: 173/47 (104/50 - 173/47)  BP(mean): --  RR: 18 (18 - 18)  SpO2: 79% (79% - 100%)  PHYSICAL EXAM  General Appearance: cooperative, no acute distress,   HEENT: PERRL, conjunctiva clear, EOM's intact, non injected pharynx, no exudate, TM   normal  Neck: Supple, , no adenopathy, thyroid: not enlarged, no carotid bruit or JVD  Back: Symmetric, no  tenderness,no soft tissue tenderness  Lungs: Clear to auscultation bilateral,no adventitious breath sounds, normal   expiratory phase  Heart: Regular rate and rhythm, S1, S2 normal, no murmur, rub or gallop  Abdomen: Soft, non-tender, bowel sounds active , no hepatosplenomegaly  Extremities: no cyanosis or edema, no joint swelling  Skin: Skin color, texture normal, no rashes   Neurologic: Alert and oriented X3 , cranial nerves intact, sensory and motor normal,    ECG:    LABS:                                   8.5    6.4   )-----------( 285      ( 04 Apr 2017 06:58 )             25.8   03 Apr 2017 10:07    148    |  113    |  62     ----------------------------<  166    3.8     |  26     |  1.99     Ca    7.7        03 Apr 2017 10:07                            8.7    7.5   )-----------( 280      ( 02 Apr 2017 05:52 )             26.2                8.0    x     )-----------( x        ( 31 Mar 2017 03:29 )             24.0                             7.0    7.5   )-----------( 271      ( 30 Mar 2017 05:24 )             21.9                           7.1    6.9   )-----------( 264      ( 29 Mar 2017 05:51 )             22.0                        28 Mar 2017 06:03    149    |  117    |  82     ----------------------------<  125    4.3     |  24     |  1.98     Ca    7.8        28 Mar 2017 06:03                    7.7    8.8   )-----------( 239      ( 27 Mar 2017 05:49 )             24.0       27 Mar 2017 05:49    151    |  117    |  77     ----------------------------<  110    4.1     |  23     |  1.87     Ca    8.0        27 Mar 2017 05:49                 8.2    x     )-----------( x        ( 23 Mar 2017 07:54 )             25.4     23 Mar 2017 05:00    151    |  118    |  107    ----------------------------<  113    4.5     |  23     |  2.50     Ca    7.8        23 Mar 2017 05:00    TPro  5.7    /  Alb  2.4    /  TBili  0.5    /  DBili  x      /  AST  23     /  ALT  11     /  AlkPhos  35     21 Mar 2017 09:55    CARDIAC MARKERS ( 21 Mar 2017 17:23 )  0.028 ng/mL / x     / 196 U/L / x     / x      CARDIAC MARKERS ( 21 Mar 2017 09:55 )  0.023 ng/mL / x     / 172 U/L / x     / x            Pro BNP  1025 03-21 @ 09:55  D Dimer  -- 03-21 @ 09:55    PT/INR - ( 21 Mar 2017 09:55 )   PT: 11.9 sec;   INR: 1.08 ratio    cxr noted  PROCEDURE DATE:  03/29/2017        INTERPRETATION:  Single view chest    History CHF    Comparison 6 days ago    There is interval development of mild left retrocardiac volume loss   without layering effusion or licha central edema. The heart is normal in   size for projection.    IMPRESSION:    Retrocardiac opacity consistent with atelectasis or pneumonia     PTT - ( 21 Mar 2017 09:55 )  PTT:35.5 sec  INTERPRETATION:  Exam Date: 3/21/2017 7:33 AM    History: Back pain    Technique: Single frontal portable view of the chest with comparison to    10/20/2016    Findings:    Studies limited by rotation.    The heart is enlarged. No focal consolidation. The apices and   hemidiaphragms are unremarkable. Degenerative changes of the visualized   osseous structures.        Impression:EXAM:  CHEST SINGLE VIEW FRONTAL                            PROCEDURE DATE:  03/23/2017        INTERPRETATION:  Views:1  Comparison: 2 days ago  History: CHF    The lungs are clear of infiltrates and effusions.     The cardiac and   mediastinal contours appear unremarkable.     Impression:  1. No evidence of acute pulmonary disease.      Cardiomegaly      Ultrasound of the right and left lower extremity deep venous system         CLINICAL INFORMATION:      Pain and swelling, deep venous thrombosis.    TECHNIQUE:   Doppler ultrasonography of the bilateral lower extremity   deep venous system was performed.  The veins evaluated included the   common femoral vein, the inflow of the greater saphenous vein, the   superficial femoral vein,  the popliteal vein and the posterior tibial   vein in the proximal calf.      FINDINGS:    No previous examinations are available for review.    The bilateral lower extremity deep venous system demonstrated no   abnormality  with the exception of near occlusive thrombus in the RIGHT   posterior tibial vein..  The veins were patent with intact Doppler flow.     This flow varied with respiration.   Flow augmented with ipsilateral   calf compression. On transverse and longitudinal images no intraluminal   thrombus was found.  The veins were directly compressible by the   ultrasound transducer.            IMPRESSION:   near occlusive thrombus in the RIGHT posterior tibial vein     ABG - ( 23 Mar 2017 11:07 )  pH: 7.40  /  pCO2: 35    /  pO2: 109   / HCO3: 21    / Base Excess: -3    /  SaO2: 99            28 Mar 2017 06:03    149    |  117    |  82     ----------------------------<  125    4.3     |  24     |  1.98     Ca    7.8        28 Mar 2017 06:03                          8.0    x     )-----------( x        ( 31 Mar 2017 03:29 )             24.0       RADIOLOGY & ADDITIONAL STUDIES:

## 2017-04-04 NOTE — PROGRESS NOTE ADULT - ASSESSMENT
82 year old man w/ PMH of HTN, COPD on home O2 2L, SHAYY on nocturnal BIPAP, ex-smoker (smoked 1ppd X 50 years, quit 22 years ago),  Chronic diastolic CHF, CAD s/p PCI with stent in 2002, Last cath in july 2014 with RCA disease medically managed, Hyperlipidemia, PVD, Iron deficiency anemia,Chronic back pain, Gout, BPH, CKD III with baseline Cr 2.2,  PAF not on a/c, Hx of GI Bleed in the past 10/16 had sigmoidoscopy , hemorrhoids s/p banding, family hx of htn, dm, colon ca, now presenting with severe anemia on 3/21/17 .  Patient  has been seen by pcp and was found to have low hb and presented to ER. In Er he was found to have hb of 6.3.  Found to have bun/cr of 114/3.51.     1-Acute anemia ,  secondary to blood loss anemia  -s/p 11 units since admission.  Hgb steady 8.9-9.0  - EGD/colonscopy that revealed  non-bleeding ulcers (one with eschar) in stomach and colonic mass.   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 diet as per GI    2-CAD w/ recent stent placement  -cardio on board, off asa/plavix for now; consider starting asa 81 mg from 4/4    3-Chronic SHAYY and  copd  w/ chronic respiratory failure on home o2 and bipap nightly. c/w night bipap and supplemental o2    4- Acute renal failure on stage III CKD possibly secondary to ATN secondary to volume loss  -Baseline creatinine is 2.4 (2017).Admited with 3.51. Cr now improved to 1.91.nephrology services on board .hold lasix as per nephro or use intermittently    5- Acute back pain secondary to spinal arthropathy   MRI lumbar spine noted. -physical therapy for discharge. pain control    6-Paroxysmal atrial fibrillation.  Plan: currently in sinus rhythm on EKG . not on AC due to hx of GI bleed.     7-Chronic diastolic congestive heart failure. not in exacerbation    8-DVT proph- Left sided SCD only secondary to gi bleed and right leg DVT    9-mild hypernatremia- possible secondary to mild dehydration. promote oral fluid intake. -hold lasix - Renal is following , d/c NS    10 - b/l  chronic venous stasis dermatitis and ulcer on the right lower extremity anterior surface. PTA, wound care with Dr Cardenas  -wound care recs: patient has been undergoing whirlpool treatment to both lower extremities to remove debris and assist with odor control. 1) Daily Whirlpool treatments with 2 drops of hibiclens 2) After whirlpool apply Xeroform, kerlix and ace bandage. 3) Elevate extremities off mattress. 4) Turn and position every 2 hours  -investigate surgical shoes for feet (ordered via nursing). currently wearing cut crocs."  -  apply Xeroform, kerlix and ace bandage    11-lower extremity diabetic neuropahty. c/w gabapentin, acetaminophen. PRN oxycodone for severe pain    12-Essential hypertension: c/w current management BP stable over course of admission.     13-:Morbid Obesity :bMI >35 . nutrition consult appreciated    14) Right Post Tibial DVT: New,  Dx on 4/3/17; can't get AC, s/p IR  IVC filter 4/4/17    15. Acute RUE DVT - d/w Dr. Clement - no need for SVC filter due to very low risk of PE

## 2017-04-04 NOTE — PROGRESS NOTE ADULT - SUBJECTIVE AND OBJECTIVE BOX
NEPHROLOGY INTERVAL HPI/OVERNIGHT EVENTS:    HPI:  HPI: :: 82 y/o male with PMHx of COPD, CHF, chronic renal failure, paroxysmal A-fib.with last hospitaliztion 10/2017 for hematochezia s/p bleeding scan and flex sigmoidoscopy  was BIBA for acute worsening lower back pain radiating to buttock to foot   patient with amber boots for chronic venous stasis ulcers and lower extremity chronic edema ,daughter reports increased oozing from ulcers in lai past few weeks,no fever  In Ed patient noted to have Hb 6 and stool guaiac positive  and rectal exam- with cookie  done by ED physician as per note   Patient denies any chest pain or worsening of SOB ,no abdominal pain or vomiting ,no diarrhea,no efver or chills    4/4  s/p umbrella filter  c/o abd gas  no new issues  will have sono UE , has phlebitis left arm better   urinating 400 ml at a time, good UO    PMHx: ::  HTN,  COPD on home O2 2L,  SHAYY on nocturnal BIPAP,  Chronic diastolic CHF,  CAD s/p PCI with stent in 2002,  Last cath in july 2014 with RCA disease medically managed, Hyperlipidemia,  PVD,  Iron deficiency anemia,  Chronic back pain,  Gout,  BPH,  CKD III with baseline Cr 2.2,  PAF not on a/c,  Hx of GI Bleed in the past, hemorrhoids s/p banding,  PSHx: ::  Right rotator cuff repair  pilonidal cyst  Family History: ::  Father: rectal CA  Mother: HTN, DM  3 siblings: DM  Social History: ::  Tobacco: smoked 1ppd X 50 years, quit 22 years ago.  Rare ETOH use.  wife recently passed away last year (21 Mar 2017 06:35)      PAST MEDICAL & SURGICAL HISTORY:  Sepsis, due to unspecified organism: 2/2 poorly healing wounds b/l  BPH (benign prostatic hypertrophy)  Hyperlipemia  Coronary artery disease  Hypertension  Dyspepsia: On moderate exertion.  Sleep apnea, obstructive: Requires home 02 therapy, and treatment with BIPAP  Atelectasis  Pleural effusion, bilateral  Respiratory failure  Peripheral edema  CRI (chronic renal insufficiency)  Gout  CRF (chronic renal failure)  Benign prostatic hypertrophy  Spinal stenosis  Hypercholesterolemia  GERD (gastroesophageal reflux disease)  CAD (coronary artery disease)  Hypertension  S/P angioplasty with stent  Cataract of left eye  Prostate: Surgery green light procedure.  S/P rotator cuff surgery: Right  S/P angioplasty  Rotator cuff tear, right: repair      FAMILY HISTORY:  No pertinent family history in first degree relatives      MEDICATIONS  (STANDING):  finasteride 5milliGRAM(s) Oral daily  buDESOnide   0.5 milliGRAM(s) Respule 0.5milliGRAM(s) Nebulizer two times a day  doxazosin 8milliGRAM(s) Oral at bedtime  isosorbide   mononitrate ER Tablet (IMDUR) 120milliGRAM(s) Oral daily  gabapentin Oral Tab/Cap - Peds 600milliGRAM(s) Oral at bedtime  allopurinol 100milliGRAM(s) Oral daily  loratadine 10milliGRAM(s) Oral daily  fenofibrate Tablet 145milliGRAM(s) Oral daily  simvastatin 10milliGRAM(s) Oral at bedtime  pramipexole 0.125milliGRAM(s) Oral three times a day  metoprolol Oral Tab/Cap - Peds 12.5milliGRAM(s) Oral two times a day  hydrALAZINE 150milliGRAM(s) Oral two times a day  ammonium lactate 12% Lotion 1Application(s) Topical two times a day  diltiazem   CD 240milliGRAM(s) Oral daily  senna 2Tablet(s) Oral at bedtime  glycerin Suppository - Adult 1Suppository(s) Rectal daily  fluticasone propionate 50 MICROgram(s)/spray Nasal Spray 1Spray(s) Both Nostrils two times a day  furosemide    Tablet 40milliGRAM(s) Oral daily  sodium ferric gluconate complex IVPB 125milliGRAM(s) IV Intermittent at bedtime  pantoprazole    Tablet 40milliGRAM(s) Oral two times a day before meals  simethicone 80milliGRAM(s) Chew two times a day    MEDICATIONS  (PRN):  nitroglycerin     SubLingual 0.4milliGRAM(s) SubLingual every 5 minutes PRN Chest Pain  ondansetron Injectable 4milliGRAM(s) IV Push every 6 hours PRN Nausea and/or Vomiting  diphenhydrAMINE   Elixir 12.5milliGRAM(s) Oral every 6 hours PRN Rash and/or Itching  oxyCODONE IR 5milliGRAM(s) Oral two times a day PRN Severe Pain (7 - 10)  acetaminophen   Tablet 650milliGRAM(s) Oral every 6 hours PRN For Temp greater than 38 C (100.4 F)  aluminum hydroxide/magnesium hydroxide/simethicone Suspension 30milliLiter(s) Oral every 6 hours PRN Dyspepsia      Allergies    No Known Allergies    Intolerances        I&O's Summary  I & Os for 24h ending 04 Apr 2017 07:00  =============================================  IN: 100 ml / OUT: 0 ml / NET: 100 ml    I & Os for current day (as of 04 Apr 2017 13:33)  =============================================  IN: 100 ml / OUT: 200 ml / NET: -100 ml        REVIEW OF SYSTEMS:    CONSTITUTIONAL:  As per HPI.    HEENT:  Eyes:  No diplopia or blurred vision. ENT:  No earache, sore throat or runny nose.    CARDIOVASCULAR:  No pressure, squeezing, strangling, tightness, heaviness or aching about the chest, neck, axilla or epigastrium.    RESPIRATORY:  No cough, shortness of breath, PND or orthopnea.    GASTROINTESTINAL:  No nausea, vomiting or diarrhea.    GENITOURINARY:  No dysuria, frequency or urgency.    MUSCULOSKELETAL:  As per HPI.    SKIN: feet wrapped, edema    NEUROLOGIC:  No paresthesias, fasciculations, seizures or weakness.    PSYCHIATRIC:  No disorder of thought or mood.    ENDOCRINE:  No heat or cold intolerance, polyuria or polydipsia.    HEMATOLOGICAL:  No easy bruising or bleeding.      Vital Signs Last 24 Hrs  T(C): 37.2, Max: 37.6 (04-04 @ 10:30)  T(F): 99, Max: 99.6 (04-04 @ 10:30)  HR: 69 (69 - 100)  BP: 151/45 (104/50 - 173/47)  BP(mean): --  RR: 17 (12 - 20)  SpO2: 96% (79% - 100%)  Daily     Daily     PHYSICAL EXAM:    General:  Alert, well-developed ,No acute distress.    Neuro:  Alert and oriented to person, place, and time. Able to communicate  well. Cranial nerves 2-12 grossly intact. 5/5 strength in all  extremities bilaterally. Sensation intact in all extremities.  Appropriate affect.     HEENT:  No JVD, no masses, Eyes anicteric, No carotid bruits.No lymphadenopathy,    Cardiovascular:  Regular rate and rhythm, with normal S1 and S2. No murmurs, rubs,  or gallops. No JVD.    Lungs:  Lungs clear. no rales, no wheezing, .    Abdomen:  Normoactive bowel sounds. Soft, flat, non-tender, and non-distended.  No hepatosplenomegaly, positive bowel sounds, softly distended    Skin:  Warm, dry, well-perfused. No rashes or other lesions.     Extremities:  legs wrapped, edema improving    LABS:                        8.5    6.4   )-----------( 285      ( 04 Apr 2017 06:58 )             25.8     04 Apr 2017 06:58    150    |  113    |  55     ----------------------------<  94     3.9     |  27     |  1.85     Ca    7.9        04 Apr 2017 06:58

## 2017-04-04 NOTE — PROGRESS NOTE ADULT - ATTENDING COMMENTS
Dispo: restarted on [id number], d/w Dr. Clement - no need for whirlpool treatments just wound care daily dressing changes, elevation

## 2017-04-04 NOTE — PROGRESS NOTE ADULT - ASSESSMENT
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

## 2017-04-05 ENCOUNTER — TRANSCRIPTION ENCOUNTER (OUTPATIENT)
Age: 82
End: 2017-04-05

## 2017-04-05 VITALS
HEART RATE: 81 BPM | DIASTOLIC BLOOD PRESSURE: 39 MMHG | SYSTOLIC BLOOD PRESSURE: 136 MMHG | RESPIRATION RATE: 18 BRPM | OXYGEN SATURATION: 100 % | TEMPERATURE: 98 F

## 2017-04-05 LAB
ANION GAP SERPL CALC-SCNC: 8 MMOL/L — SIGNIFICANT CHANGE UP (ref 5–17)
BUN SERPL-MCNC: 52 MG/DL — HIGH (ref 7–23)
CALCIUM SERPL-MCNC: 8.4 MG/DL — LOW (ref 8.5–10.1)
CHLORIDE SERPL-SCNC: 112 MMOL/L — HIGH (ref 96–108)
CO2 SERPL-SCNC: 28 MMOL/L — SIGNIFICANT CHANGE UP (ref 22–31)
CREAT SERPL-MCNC: 1.7 MG/DL — HIGH (ref 0.5–1.3)
FOLATE SERPL-MCNC: 9.4 NG/ML — SIGNIFICANT CHANGE UP (ref 4.8–24.2)
GLUCOSE SERPL-MCNC: 105 MG/DL — HIGH (ref 70–99)
HCT VFR BLD CALC: 28.2 % — LOW (ref 39–50)
HGB BLD-MCNC: 9.2 G/DL — LOW (ref 13–17)
MAGNESIUM SERPL-MCNC: 2.3 MG/DL — SIGNIFICANT CHANGE UP (ref 1.8–2.4)
MCHC RBC-ENTMCNC: 30.3 PG — SIGNIFICANT CHANGE UP (ref 27–34)
MCHC RBC-ENTMCNC: 32.7 GM/DL — SIGNIFICANT CHANGE UP (ref 32–36)
MCV RBC AUTO: 92.5 FL — SIGNIFICANT CHANGE UP (ref 80–100)
PLATELET # BLD AUTO: 322 K/UL — SIGNIFICANT CHANGE UP (ref 150–400)
POTASSIUM SERPL-MCNC: 4.2 MMOL/L — SIGNIFICANT CHANGE UP (ref 3.5–5.3)
POTASSIUM SERPL-SCNC: 4.2 MMOL/L — SIGNIFICANT CHANGE UP (ref 3.5–5.3)
RBC # BLD: 3.05 M/UL — LOW (ref 4.2–5.8)
RBC # FLD: 15 % — HIGH (ref 10.3–14.5)
SODIUM SERPL-SCNC: 148 MMOL/L — HIGH (ref 135–145)
VIT B12 SERPL-MCNC: 658 PG/ML — SIGNIFICANT CHANGE UP (ref 243–894)
WBC # BLD: 6.9 K/UL — SIGNIFICANT CHANGE UP (ref 3.8–10.5)
WBC # FLD AUTO: 6.9 K/UL — SIGNIFICANT CHANGE UP (ref 3.8–10.5)

## 2017-04-05 RX ORDER — HYDRALAZINE HCL 50 MG
1 TABLET ORAL
Qty: 0 | Refills: 0 | COMMUNITY

## 2017-04-05 RX ORDER — SIMETHICONE 80 MG/1
1 TABLET, CHEWABLE ORAL
Qty: 0 | Refills: 0 | COMMUNITY
Start: 2017-04-05

## 2017-04-05 RX ORDER — SENNA PLUS 8.6 MG/1
2 TABLET ORAL
Qty: 0 | Refills: 0 | COMMUNITY
Start: 2017-04-05

## 2017-04-05 RX ORDER — FLUTICASONE PROPIONATE 50 MCG
1 SPRAY, SUSPENSION NASAL
Qty: 0 | Refills: 0 | COMMUNITY
Start: 2017-04-05

## 2017-04-05 RX ORDER — PANTOPRAZOLE SODIUM 20 MG/1
1 TABLET, DELAYED RELEASE ORAL
Qty: 0 | Refills: 0 | COMMUNITY
Start: 2017-04-05

## 2017-04-05 RX ORDER — ACETAMINOPHEN 500 MG
2 TABLET ORAL
Qty: 0 | Refills: 0 | COMMUNITY
Start: 2017-04-05

## 2017-04-05 RX ORDER — FUROSEMIDE 40 MG
0 TABLET ORAL
Qty: 0 | Refills: 0 | COMMUNITY

## 2017-04-05 RX ORDER — GLYCERIN ADULT
1 SUPPOSITORY, RECTAL RECTAL
Qty: 0 | Refills: 0 | COMMUNITY
Start: 2017-04-05

## 2017-04-05 RX ORDER — FUROSEMIDE 40 MG
1 TABLET ORAL
Qty: 0 | Refills: 0 | COMMUNITY
Start: 2017-04-05

## 2017-04-05 RX ADMIN — Medication 1 SPRAY(S): at 06:08

## 2017-04-05 RX ADMIN — FINASTERIDE 5 MILLIGRAM(S): 5 TABLET, FILM COATED ORAL at 13:21

## 2017-04-05 RX ADMIN — Medication 40 MILLIGRAM(S): at 06:10

## 2017-04-05 RX ADMIN — Medication 12.5 MILLIGRAM(S): at 06:09

## 2017-04-05 RX ADMIN — Medication 100 MILLIGRAM(S): at 13:22

## 2017-04-05 RX ADMIN — Medication 150 MILLIGRAM(S): at 06:10

## 2017-04-05 RX ADMIN — Medication 81 MILLIGRAM(S): at 13:24

## 2017-04-05 RX ADMIN — PRAMIPEXOLE DIHYDROCHLORIDE 0.12 MILLIGRAM(S): 0.12 TABLET ORAL at 06:09

## 2017-04-05 RX ADMIN — PANTOPRAZOLE SODIUM 40 MILLIGRAM(S): 20 TABLET, DELAYED RELEASE ORAL at 06:09

## 2017-04-05 RX ADMIN — Medication 240 MILLIGRAM(S): at 06:09

## 2017-04-05 RX ADMIN — Medication 145 MILLIGRAM(S): at 13:22

## 2017-04-05 RX ADMIN — SIMETHICONE 80 MILLIGRAM(S): 80 TABLET, CHEWABLE ORAL at 06:09

## 2017-04-05 RX ADMIN — Medication 0.5 MILLIGRAM(S): at 08:53

## 2017-04-05 RX ADMIN — Medication 1 APPLICATION(S): at 06:08

## 2017-04-05 NOTE — PROGRESS NOTE ADULT - ASSESSMENT
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

## 2017-04-05 NOTE — DISCHARGE NOTE ADULT - MEDICATION SUMMARY - MEDICATIONS TO TAKE
I will START or STAY ON the medications listed below when I get home from the hospital:    Avodart 0.5 mg oral capsule  -- 1 cap(s) by mouth once a day  -- Indication: For BPH    budesonide 0.5 mg/2 mL inhalation suspension  -- 2 milliliter(s) inhaled 2 times a day, As Needed  -- Indication: For COPD    Ecotrin Adult Low Strength 81 mg oral delayed release tablet  -- 1 tab(s) by mouth once a day  -- Indication: For Chronic diastolic congestive heart failure    traMADol 50 mg oral tablet  -- 1 tab(s) by mouth every 6 hours, As Needed  -- Indication: For Spinal stenosis of lumbosacral region    acetaminophen 325 mg oral tablet  -- 2 tab(s) by mouth every 6 hours, As needed, For Temp greater than 38 C (100.4 F)  -- Indication: For Spinal stenosis of lumbosacral region    aluminum hydroxide-magnesium hydroxide 200 mg-200 mg/5 mL oral suspension  -- 30 milliliter(s) by mouth every 6 hours, As needed, Dyspepsia  -- Indication: For GASTROINTESTINAL BLEED    doxazosin 8 mg oral tablet  -- 1 tab(s) by mouth once a day (at bedtime)  -- Indication: For BPH    nitroglycerin 0.4 mg sublingual tablet  -- 1 tab(s) under tongue every 5 minutes, As Needed  -- Indication: For Chronic diastolic congestive heart failure    isosorbide mononitrate 120 mg oral tablet, extended release  -- 1 tab(s) by mouth once a day (in the morning)  -- Indication: For Chronic diastolic congestive heart failure    diltiazem 120 mg/12 hours oral capsule, extended release  -- 1 cap(s) by mouth every 12 hours  -- Indication: For Paroxysmal atrial fibrillation    gabapentin 300 mg oral capsule  -- 2 cap(s) by mouth once a day (at bedtime)  -- Indication: For Spinal stenosis of lumbosacral region    gabapentin 300 mg oral capsule  -- 1 tab(s) by mouth once a day (in the morning)  -- Indication: For Spinal stenosis of lumbosacral region    allopurinol 100 mg oral tablet  -- 1 tab(s) by mouth once a day  -- Indication: For Gout    levocetirizine 5 mg oral tablet  -- 1 tab(s) by mouth once a day (in the evening)  -- Indication: For Allergies    fenofibrate 145 mg oral tablet  -- 1 tab(s) by mouth once a day  -- Indication: For hyperlipidemia    simvastatin 10 mg oral tablet  -- 1 tab(s) by mouth once a day (at bedtime)  -- Indication: For hyperlipidemia    pramipexole 0.125 mg oral tablet  -- 1 tab(s) by mouth 3 times a day  -- Indication: For restless leg syndrome    metoprolol tartrate 25 mg oral tablet  -- 0.5 tab(s) by mouth 2 times a day  -- Indication: For hypertension    DuoNeb 0.5 mg-2.5 mg/3 mL inhalation solution  -- 3 milliliter(s) inhaled 4 times a day, As Needed  -- Indication: For CoPD    ammonium lactate topical cream  -- Apply on skin to affected area 2 times a day  -- to B/l upper xtremities  -- Indication: For Dry skin    Lidoderm 5% topical film  -- Apply on skin to affected area once a day  -- Indication: For Pain    furosemide 40 mg oral tablet  -- 1 tab(s) by mouth once a day  -- Indication: For Chronic diastolic congestive heart failure    ranitidine 150 mg oral capsule  -- 1 cap(s) by mouth once a day (at bedtime)  -- Indication: For GASTROINTESTINAL BLEED    ferrous sulfate 325 mg (65 mg elemental iron) oral delayed release tablet  -- 1 tab(s) by mouth 3 times a day  -- Indication: For GASTROINTESTINAL BLEED    senna oral tablet  -- 2 tab(s) by mouth once a day (at bedtime)  -- Indication: For GASTROINTESTINAL BLEED    glycerin adult rectal suppository  -- 1 suppository(ies) rectally once a day  -- Indication: For As needed for constipation    simethicone 80 mg oral tablet, chewable  -- 1 tab(s) by mouth 2 times a day  -- Indication: For for bloating    fluticasone 50 mcg/inh nasal spray  -- 1 spray(s) into nose 2 times a day  -- Indication: For nasal congestion    Chondroitin-Glucosamine 200 mg-250 mg oral capsule  -- 1 cap(s) by mouth 2 times a day  -- Indication: For osteoartritis    Fish Oil 1000 mg oral capsule  -- 1 cap(s) by mouth 2 times a day  -- Indication: For hyperlipidemia    pantoprazole 40 mg oral delayed release tablet  -- 1 tab(s) by mouth 2 times a day (before meals)  -- Indication: For GERD    hydrALAZINE  -- 150 milligram(s) by mouth 2 times a day  -- Indication: For HTN    Vitamin B Compound Strong  -- 1 tab(s) by mouth once a day  -- Indication: For vitamin b    Vitamin C 1000 mg oral tablet  -- 1 tab(s) by mouth once a day  -- Indication: For vitamins    Vitamin D3 2000 intl units oral capsule  -- 1 cap(s) by mouth once a day  -- Indication: For vitamins

## 2017-04-05 NOTE — PROGRESS NOTE ADULT - PROVIDER SPECIALTY LIST ADULT
Cardiology
Family Medicine
Gastroenterology
Hospitalist
Nephrology
Pulmonology
Vascular Surgery
Internal Medicine
Orthopedics

## 2017-04-05 NOTE — DISCHARGE NOTE ADULT - PATIENT PORTAL LINK FT
“You can access the [facility] Patient Portal, offered by [facility], by registering with the following website: [email or web address]”

## 2017-04-05 NOTE — DISCHARGE NOTE ADULT - PLAN OF CARE
resolve monitor , follow up with renal within 2-3 weeks continue with iron supplements, repeat CBC in 1 week follow up with GI in 2-4 weeks take aspirin 81, follow up with PCP, cardiology O2 supplements, follow up with pulmonology s/p IVC filter, DVT in LLE and RUE, follow up with PCP for up to date cancer screening after rhehab

## 2017-04-05 NOTE — DISCHARGE NOTE ADULT - INSTRUCTIONS
bilateral lower extremities washed with normal saline, xeroform dressing top with kerlix and ace wrap

## 2017-04-05 NOTE — DISCHARGE NOTE ADULT - CARE PLAN
Principal Discharge DX:	Acute kidney injury superimposed on CKD  Goal:	resolve  Instructions for follow-up, activity and diet:	monitor , follow up with renal within 2-3 weeks  Secondary Diagnosis:	Anemia due to blood loss  Instructions for follow-up, activity and diet:	continue with iron supplements, repeat CBC in 1 week  Secondary Diagnosis:	GI bleed  Instructions for follow-up, activity and diet:	follow up with GI in 2-4 weeks  Secondary Diagnosis:	Paroxysmal atrial fibrillation  Instructions for follow-up, activity and diet:	take aspirin 81, follow up with PCP, cardiology  Secondary Diagnosis:	Chronic respiratory failure with hypercapnia  Instructions for follow-up, activity and diet:	O2 supplements, follow up with pulmonology  Secondary Diagnosis:	Essential hypertension  Secondary Diagnosis:	DVT (deep venous thrombosis)  Instructions for follow-up, activity and diet:	s/p IVC filter, DVT in LLE and RUE, follow up with PCP for up to date cancer screening after rhehab

## 2017-04-05 NOTE — DISCHARGE NOTE ADULT - SECONDARY DIAGNOSIS.
Anemia due to blood loss GI bleed Paroxysmal atrial fibrillation Chronic respiratory failure with hypercapnia Essential hypertension DVT (deep venous thrombosis)

## 2017-04-05 NOTE — DISCHARGE NOTE ADULT - OTHER SIGNIFICANT FINDINGS
Complete Blood Count in AM (04.05.17 @ 06:49)    WBC Count: 6.9 K/uL    RBC Count: 3.05 M/uL    Hemoglobin: 9.2 g/dL    Hematocrit: 28.2 %    Mean Cell Volume: 92.5 fl    Mean Cell Hemoglobin: 30.3 pg    Mean Cell Hemoglobin Conc: 32.7 gm/dL    Red Cell Distrib Width: 15.0 %    Platelet Count - Automated: 322 K/uL    Basic Metabolic Panel in AM (04.05.17 @ 06:49)    Sodium, Serum: 148 mmol/L    Potassium, Serum: 4.2 mmol/L    Chloride, Serum: 112 mmol/L    Carbon Dioxide, Serum: 28 mmol/L    Anion Gap, Serum: 8 mmol/L    Blood Urea Nitrogen, Serum: 52 mg/dL    Creatinine, Serum: 1.70 mg/dL    Glucose, Serum: 105 mg/dL    Calcium, Total Serum: 8.4 mg/dL        US DPLX UPR EXT VEINS LTD RT      04/04/2017    Positive for deep vein thrombus within the basilic vein in   the forearm. There are additional focal areas of noncompressibility and   poor vascular flow in the right cephalic vein of the mid upper arm and   the distal right cephalic vein at the wrist, compatible with focal   superficial vein thrombi.   US DPLX LWR EXT VEINS COMPL BI     04/03/2017    near occlusive thrombus in the RIGHT posterior tibial vein     CHEST SINGLE VIEW FRONTAL      03/31/2017  No active pulmonary disease. improved aeration of the LEFT   lower lobe with resolution of previously noted atelectasis    LUMBAR SPINE(MRI)W O CON      03/21/2017   Mild disc degeneration at L2-L3 through L4-5 with bulges at   these levels which flatten the ventral thecal sac and narrow the   BILATERAL neural foramina. Facet osteophytic hypertrophy is noted at C   L3-4 through L5-S1 with multiple posterior projecting synovial cyst   involving the LEFT L3-4, BILATERAL L4-5 and BILATERAL L5-S1 facet joints.    Upper GI endoscopy3/31/2017 7:35 AMDiffuse candidiasis was found in the entire esophagus.Few non-bleeding cratered gastric ulcers with no stigmata of bleeding were found in the gastric fundus. Biopsies were taken with a cold  forceps for histology. For hemostasis, one hemostatic clip was successfully placed (MR conditional). There was no bleeding at the endof the procedure.Diulafoy lesion in D4, cauterized with gold probe and then clipped times 2, tattoo placed 1 inch distal to itlimited G bx due to bleed from site of bx and needed clipcandida in E not biopsied Impression:          - Monilial esophagitis. Non-bleeding gastric ulcers with no stigmata of  bleeding. Biopsied. Clip (MR conditional) was placed.

## 2017-04-05 NOTE — PROGRESS NOTE ADULT - SUBJECTIVE AND OBJECTIVE BOX
Patient is a 82y old  Male who presents with a chief complaint of left leg pain radiating from left buttoch and thigh (21 Mar 2017 06:35)      HPI:  HPI: :: 80 y/o male with PMHx of COPD, CHF, chronic renal failure, paroxysmal A-fib.with last hospitaliztion 10/2017 for hematochezia s/p bleeding scan and flex sigmoidoscopy  was BIBA for acute worsening lower back pain radiating to buttock to foot   patient with amber boots for chronic venous stasis ulcers and lower extremity chronic edema ,daughter reports increased oozing from ulcers in lai past few weeks,no fever  In Ed patient noted to have Hb 6 and stool guaiac positive  and rectal exam- with cookie  done by ED physician as per note   Patient denies any chest pain or worsening of SOB ,no abdominal pain or vomiting ,no diarrhea,no efver or chills  seen and evaluated along with Dr SANTIZO  His dtr is contacted as well    all his recent data and PFT data obtained and discussed with his cardiologist today 3/24  pt is scheduled for his GI workup today    3/27  pt is seen and examined and feels better  asks for food  no further transfusions required  no distress  tolerated procedure well  no co  no sob  3/28  asks about going home  some nasal congestion  no co  no difficulty bretahing  uses bipap at night  3/29  feels well this am  smiling  no dyspnea  nasal congestion improved with nasal sparys use  3/30  no acute distress  getting cleaned this am while in bed  bilateral leg ulcers noted  3/31  going to repeat endoscopy this am after bleeding scan noted  dtrs at bedside  no resp issues reported at all  feels ok  no cp or sob at all  4/2  feels better overall  no cp  smioling  having bkfst without o2 this am  4/4  feel wilian  recent data reveiwed  no pleuritic pain  seen on 5 east  4/5  in the bathroom today  s/p IVC filter  uneventful night last night  no distress  PMHx: ::  HTN,  COPD on home O2 2L,  SHAYY on nocturnal BIPAP,  Chronic diastolic CHF,  CAD s/p PCI with stent in 2002,  Last cath in july 2014 with RCA disease medically managed, Hyperlipidemia,  PVD,  Iron deficiency anemia,  Chronic back pain,  Gout,  BPH,  CKD III with baseline Cr 2.2,  PAF not on a/c,  Hx of GI Bleed in the past, hemorrhoids s/p banding,  PSHx: ::  Right rotator cuff repair  pilonidal cyst  Family History: ::  Father: rectal CA  Mother: HTN, DM  3 siblings: DM  Social History: ::  Tobacco: smoked 1ppd X 50 years, quit 22 years ago.  Rare ETOH use.  wife recently passed away last year (21 Mar 2017 06:35)      PAST MEDICAL & SURGICAL HISTORY:  Sepsis, due to unspecified organism: 2/2 poorly healing wounds b/l  BPH (benign prostatic hypertrophy)  Hyperlipemia  Coronary artery disease  Hypertension  Dyspepsia: On moderate exertion.  Sleep apnea, obstructive: Requires home 02 therapy, and treatment with BIPAP  Atelectasis  Pleural effusion, bilateral  Respiratory failure  Peripheral edema  CRI (chronic renal insufficiency)  Gout  CRF (chronic renal failure)  Benign prostatic hypertrophy  Spinal stenosis  Hypercholesterolemia  GERD (gastroesophageal reflux disease)  CAD (coronary artery disease)  Hypertension  S/P angioplasty with stent  Cataract of left eye  Prostate: Surgery green light procedure.  S/P rotator cuff surgery: Right  S/P angioplasty  Rotator cuff tear, right: repair          MEDICATIONS  (STANDING):  finasteride 5milliGRAM(s) Oral daily  buDESOnide   0.5 milliGRAM(s) Respule 0.5milliGRAM(s) Nebulizer two times a day  doxazosin 8milliGRAM(s) Oral at bedtime  isosorbide   mononitrate ER Tablet (IMDUR) 120milliGRAM(s) Oral daily  gabapentin Oral Tab/Cap - Peds 600milliGRAM(s) Oral at bedtime  allopurinol 100milliGRAM(s) Oral daily  loratadine 10milliGRAM(s) Oral daily  fenofibrate Tablet 145milliGRAM(s) Oral daily  simvastatin 10milliGRAM(s) Oral at bedtime  pramipexole 0.125milliGRAM(s) Oral three times a day  metoprolol Oral Tab/Cap - Peds 12.5milliGRAM(s) Oral two times a day  hydrALAZINE 150milliGRAM(s) Oral two times a day  ammonium lactate 12% Lotion 1Application(s) Topical two times a day  diltiazem   CD 240milliGRAM(s) Oral daily  senna 2Tablet(s) Oral at bedtime  glycerin Suppository - Adult 1Suppository(s) Rectal daily  fluticasone propionate 50 MICROgram(s)/spray Nasal Spray 1Spray(s) Both Nostrils two times a day  furosemide    Tablet 40milliGRAM(s) Oral daily  sodium ferric gluconate complex IVPB 125milliGRAM(s) IV Intermittent at bedtime  pantoprazole    Tablet 40milliGRAM(s) Oral two times a day before meals    MEDICATIONS  (PRN):  nitroglycerin     SubLingual 0.4milliGRAM(s) SubLingual every 5 minutes PRN Chest Pain  ondansetron Injectable 4milliGRAM(s) IV Push every 6 hours PRN Nausea and/or Vomiting  diphenhydrAMINE   Elixir 12.5milliGRAM(s) Oral every 6 hours PRN Rash and/or Itching  oxyCODONE IR 5milliGRAM(s) Oral two times a day PRN Severe Pain (7 - 10)  acetaminophen   Tablet 650milliGRAM(s) Oral every 6 hours PRN For Temp greater than 38 C (100.4 F)  aluminum hydroxide/magnesium hydroxide/simethicone Suspension 30milliLiter(s) Oral every 6 hours PRN Dyspepsia          FAMILY HISTORY:  No pertinent family history in first degree relatives          REVIEW OF SYSTEM:  Pertinent items are noted in HPI.  Constitutional negative for chills, fevers, sweats and weight loss  throat, and face:  negative for epistaxis, nasal congestion, sore throat and   tinnitus  Respiratory: negative for cough,pos  dyspnea on exertion,no pleuritic chest pain  and wheezing  Cardiovascular:  negative for chest pain, dyspnea and palpitations  Gastrointestinal: negative for abdominal pain, diarrhea, nausea and vomiting  Genitourinary: negative for dysuria, frequency and urinary incontinence  Skin:  negative for redness, rash, pruritus, swelling, dryness and   fissures  Hematologic/lymphatic: negative for bleeding and easy bruising  Musculoskeletal: negative for arthralgias, back pain and muscle weakness  Neurological: negative for dizziness, headaches, seizures and tremors  Behavioral/Psych:  negative for mood change, depression, suicidal attempts    Allergic/Immunologic: negative for anaphylaxis, angioedema and urticaria    Vital Signs Last 24 Hrs  T(C): 36.2, Max: 37.6 (04-04 @ 10:30)  T(F): 97.2, Max: 99.6 (04-04 @ 10:30)  HR: 78 (69 - 78)  BP: 147/41 (136/40 - 157/45)  BP(mean): --  RR: 18 (12 - 20)  SpO2: 94% (94% - 97%)  PHYSICAL EXAM  General Appearance: cooperative, no acute distress,   HEENT: PERRL, conjunctiva clear, EOM's intact, non injected pharynx, no exudate, TM   normal  Neck: Supple, , no adenopathy, thyroid: not enlarged, no carotid bruit or JVD  Back: Symmetric, no  tenderness,no soft tissue tenderness  Lungs: Clear to auscultation bilateral,no adventitious breath sounds, normal   expiratory phase  Heart: Regular rate and rhythm, S1, S2 normal, no murmur, rub or gallop  Abdomen: Soft, non-tender, bowel sounds active , no hepatosplenomegaly  Extremities: no cyanosis or edema, no joint swelling  Skin: Skin color, texture normal, no rashes   Neurologic: Alert and oriented X3 , cranial nerves intact, sensory and motor normal,    ECG:    LABS:                                   8.5    6.4   )-----------( 285      ( 04 Apr 2017 06:58 )             25.8   03 Apr 2017 10:07    148    |  113    |  62     ----------------------------<  166    3.8     |  26     |  1.99     Ca    7.7        03 Apr 2017 10:07                            8.7    7.5   )-----------( 280      ( 02 Apr 2017 05:52 )             26.2                8.0    x     )-----------( x        ( 31 Mar 2017 03:29 )             24.0                             7.0    7.5   )-----------( 271      ( 30 Mar 2017 05:24 )             21.9                           7.1    6.9   )-----------( 264      ( 29 Mar 2017 05:51 )             22.0                        28 Mar 2017 06:03    149    |  117    |  82     ----------------------------<  125    4.3     |  24     |  1.98     Ca    7.8        28 Mar 2017 06:03                    7.7    8.8   )-----------( 239      ( 27 Mar 2017 05:49 )             24.0       27 Mar 2017 05:49    151    |  117    |  77     ----------------------------<  110    4.1     |  23     |  1.87     Ca    8.0        27 Mar 2017 05:49                 8.2    x     )-----------( x        ( 23 Mar 2017 07:54 )             25.4     23 Mar 2017 05:00    151    |  118    |  107    ----------------------------<  113    4.5     |  23     |  2.50     Ca    7.8        23 Mar 2017 05:00    TPro  5.7    /  Alb  2.4    /  TBili  0.5    /  DBili  x      /  AST  23     /  ALT  11     /  AlkPhos  35     21 Mar 2017 09:55    CARDIAC MARKERS ( 21 Mar 2017 17:23 )  0.028 ng/mL / x     / 196 U/L / x     / x      CARDIAC MARKERS ( 21 Mar 2017 09:55 )  0.023 ng/mL / x     / 172 U/L / x     / x            Pro BNP  1025 03-21 @ 09:55  D Dimer  -- 03-21 @ 09:55    PT/INR - ( 21 Mar 2017 09:55 )   PT: 11.9 sec;   INR: 1.08 ratio    cxr noted  PROCEDURE DATE:  03/29/2017        INTERPRETATION:  Single view chest    History CHF    Comparison 6 days ago    There is interval development of mild left retrocardiac volume loss   without layering effusion or licha central edema. The heart is normal in   size for projection.    IMPRESSION:    Retrocardiac opacity consistent with atelectasis or pneumonia     PTT - ( 21 Mar 2017 09:55 )  PTT:35.5 sec  INTERPRETATION:  Exam Date: 3/21/2017 7:33 AM    History: Back pain    Technique: Single frontal portable view of the chest with comparison to    10/20/2016    Findings:    Studies limited by rotation.    The heart is enlarged. No focal consolidation. The apices and   hemidiaphragms are unremarkable. Degenerative changes of the visualized   osseous structures.        Impression:EXAM:  CHEST SINGLE VIEW FRONTAL                            PROCEDURE DATE:  03/23/2017        INTERPRETATION:  Views:1  Comparison: 2 days ago  History: CHF    The lungs are clear of infiltrates and effusions.     The cardiac and   mediastinal contours appear unremarkable.     Impression:  1. No evidence of acute pulmonary disease.      Cardiomegaly      Ultrasound of the right and left lower extremity deep venous system         CLINICAL INFORMATION:      Pain and swelling, deep venous thrombosis.    TECHNIQUE:   Doppler ultrasonography of the bilateral lower extremity   deep venous system was performed.  The veins evaluated included the   common femoral vein, the inflow of the greater saphenous vein, the   superficial femoral vein,  the popliteal vein and the posterior tibial   vein in the proximal calf.      FINDINGS:    No previous examinations are available for review.    The bilateral lower extremity deep venous system demonstrated no   abnormality  with the exception of near occlusive thrombus in the RIGHT   posterior tibial vein..  The veins were patent with intact Doppler flow.     This flow varied with respiration.   Flow augmented with ipsilateral   calf compression. On transverse and longitudinal images no intraluminal   thrombus was found.  The veins were directly compressible by the   ultrasound transducer.            IMPRESSION:   near occlusive thrombus in the RIGHT posterior tibial vein     ABG - ( 23 Mar 2017 11:07 )  pH: 7.40  /  pCO2: 35    /  pO2: 109   / HCO3: 21    / Base Excess: -3    /  SaO2: 99            28 Mar 2017 06:03    149    |  117    |  82     ----------------------------<  125    4.3     |  24     |  1.98     Ca    7.8        28 Mar 2017 06:03                          8.0    x     )-----------( x        ( 31 Mar 2017 03:29 )             24.0       RADIOLOGY & ADDITIONAL STUDIES:

## 2017-04-05 NOTE — DISCHARGE NOTE ADULT - CONDITIONS AT DISCHARGE
Patient prior assessement remain the same, Patient lower extremities dressing done .Patient  discharge to rehab waiting for [facility]

## 2017-04-05 NOTE — DISCHARGE NOTE ADULT - CARE PROVIDER_API CALL
Irma Tomas), Internal Medicine; Nephrology  [address]  [place] [address]  Phone: [phone]  Fax: [phone]    Devante Hernández), Gastroenterology  [address] [place] [address]  Phone: [phone]  Fax: [phone]    OPAL Maxwell (), Pulmonary Disease; Sleep Medicine  [address] [place] [address]  Phone: [phone]  Fax: [phone]    Alexx Lance (DO), Cardiology; Internal Medicine  [address] [place] [address]  Phone: [phone]  Fax: [phone]

## 2017-04-05 NOTE — DISCHARGE NOTE ADULT - HOSPITAL COURSE
82 year old man w/ PMH of HTN, COPD on home O2 2L, SHAYY on nocturnal BIPAP, ex-smoker (smoked 1ppd X 50 years, quit 22 years ago),  Chronic diastolic CHF, CAD s/p PCI with stent in 2002, Last cath in july 2014 with RCA disease medically managed, Hyperlipidemia, PVD, Iron deficiency anemia, Chronic back pain, Gout, BPH, CKD III with baseline Cr 2.2,  PAF not on a/c, Hx of GI Bleed in the past 10/16 had sigmoidoscopy , hemorrhoids s/p banding, family hx of htn, dm, colon ca, now presenting with severe anemia on 3/21/17 .  Patient  has been seen by pcp and was found to have low hb and presented to ER. In Er he was found to have hb of 6.3.  Found to have bun/cr of 114/3.51.     4/4 - pt seen and examined, reports pain and swelling in RUE, denies CP, cough, abdominal pain, rectal bleeding or melena, has right heel pain , LE dressing in place  4/5 denies rectal bleeding, + normal BM, no melena, afebile, denies CP, palpitations    Vital Signs Last 24 Hrs  T(C): 36.3, Max: 36.7 (04-04 @ 21:29)  T(F): 97.3, Max: 98 (04-04 @ 21:29)  HR: 80 (73 - 80)  BP: 150/39 (147/41 - 157/45)  BP(mean): --  RR: 18 (17 - 18)  SpO2: 100% (94% - 100%)       PHYSICAL EXAM:    Constitutional: Weak appearing, walked 5 ft on 4/3/17  HEENT: Atraumatic, DEBBIE, Normal, No congestion  Respiratory: Breath Sounds normal, no rhonchi/wheeze  Cardiovascular: N S1S2; BEN present  Gastrointestinal: Abdomen soft, non tender, distended, Bowel Sounds present  Extremities: +2 edema, peripheral pulses present, LE dressing B/L, RUE induration focal erythema  Neurological: AAO x 3, no gross focal motor deficits  Skin: Non cellulitic, no rash, multiple venous ulcers dressing d/c/i  Lymph Nodes: No lymphadenopathy noted  Back: No CVA tenderness   Musculoskeletal: non tender  Breasts: Deferred  Genitourinary: deferred  Rectal: Deferred      1-Acute anemia ,  secondary to blood loss anemia requiring multiple transfusions  -s/p 11 units since admission.  Hgb steady 8.9-9.0--> 9.2  - EGD/colonscopy that revealed  non-bleeding ulcers (one with eschar) in stomach and colonic mass.   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 Carvalho- likely benign pt needs follow up within 1 month with GI    2-CAD w/ recent stent placement  -cardio on board, off asa/plavix for now; consider starting asa 81 mg from 4/4    3-Chronic SHAYY and  copd  w/ chronic respiratory failure on home o2 and bipap nightly. c/w night bipap and supplemental o2    4- Acute renal failure on stage III CKD possibly secondary to ATN secondary to volume loss  -Baseline creatinine is 2.4 Admitted with 3.51. Cr now improved to 1.91.nephrology services on board , restarted on lasix 40    5- Acute back pain secondary to spinal arthropathy   MRI lumbar spine noted. -physical therapy for discharge. pain control    6-Paroxysmal atrial fibrillation.  Plan: currently in sinus rhythm on EKG . not on AC due to hx of GI bleed.     7-Chronic diastolic congestive heart failure. not in exacerbation    8-DVT proph- Left sided SCD only secondary to gi bleed and right leg DVT    9-mild hypernatremia- possible secondary to mild dehydration. promote oral fluid intake. -hold lasix - Renal is following , d/c NS, improved    10 - b/l  chronic venous stasis dermatitis and ulcer on the right lower extremity anterior surface. PTA, wound care with Dr Cardenas  -wound care recs: no need for  whirlpool treatment   apply Xeroform, kerlix and ace bandage. 3) Elevate extremities off mattress. 4) Turn and position every 2 hours  -investigate surgical shoes for feet (ordered via nursing). currently wearing cut crocs."  -  11-lower extremity diabetic neuropahty. c/w gabapentin, acetaminophen. PRN oxycodone for severe pain    12-Essential hypertension: c/w current management BP stable over course of admission.     13-:Morbid Obesity :bMI >35 . nutrition consult appreciated    14. Right Post Tibial DVT: New,  Dx on 4/3/17; can't get AC, s/p IR  IVC filter 4/4/17    15. Acute RUE DVT - d/w Dr. Clement - no need for SVC filter due to very low risk of PE    16. RLE abd RUE DVT - outpatient follow up with PCP for up to date cancer screening    Disposition - medically optimized to be discharged to [facility] with close follow up with PCP, Renal GI, cardiology  repeat CBC, BMP in 3-4 days  return to ED if fever, abdominal pain, nausea, vomiting, chest pain, dyspnea, other concerns  Discharge plan discussed with patient, RN  Patient advised to follow up with PCP within 3-7 days  time spend 40 min  discharge note will be faxed to Dr. Hannah

## 2017-04-05 NOTE — PROGRESS NOTE ADULT - ASSESSMENT
82 y/o male with PMHx of COPD, CHF, chronic renal failure, paroxysmal A-fib.with last hospitaliztion 10/2017 for hematochezia s/p bleeding scan and flex sigmoidoscopy  was BIBA  now being worked up for GI bleed and has required multiple 4-5 units PRBCs done  now needs eval for upper and likley lower endoscopy  Chronic hypoxemic resp failure  CHF appears stable  P Afib now in nsr  hemodynamically stabe  SHAYY / OHS /COPD-overlap syndrome  suggest ABG and repeat cxr today to complete her workup  I've explained to pt and his DTR, critical care RN that pt is at incresaed risk of resp failure requiring mechanical ventilation with sedation, but his pulm status appears optimized presently for this indicated procedure with recent requirement to get muliple prbcs over last few days  recommend  ABG noted  cxr is clear  cont nocturnal BIPAP  Anesthesia veal before his procedure  cardiac eval in progress  will check outpt pfts, placed in chart  ABG noted  FEV1 6/2016 1.38, 68% predicted  DLCO 45% predicted  supportive care  BIPAP 12/8 with o2 2 liters attached  plan as per GI  cont current rx  supportive care required  Add flonase nasal spray   all recent data discussed with pt today 3/28  case discussed with Dr Hernández today  GI eval in progress for anemia  check repeat cxr today  may need additional endoscopy for anemia  hemodynamically stable  retrocardiac opacity likley atelectasis but ceratinly at increased risk of pneumonia  encourage incentive spirometry  monitor wbc and temps  duoneb with nebulizer rx  fu cxr noted  fu HCT  Dc planning as per GI  newly diagnosed right leg DVT with recent GI bleed and multiple blood transfusions  s/p  IVC filter insertion 4/4  stable resp status  I shall fu prn / outpt eval needed  thank you

## 2017-04-05 NOTE — PROGRESS NOTE ADULT - SUBJECTIVE AND OBJECTIVE BOX
Patient is a 82y old  Male who presents with a chief complaint of left leg pain radiating from left buttoch and thigh (21 Mar 2017 06:35)      HPI:  HPI: :: 80 y/o male with PMHx of COPD, CHF, chronic renal failure, paroxysmal A-fib.with last hospitaliztion 10/2017 for hematochezia s/p bleeding scan and flex sigmoidoscopy  was BIBA for acute worsening lower back pain radiating to buttock to foot   patient with amber boots for chronic venous stasis ulcers and lower extremity chronic edema ,daughter reports increased oozing from ulcers in lai past few weeks,no fever  In Ed patient noted to have Hb 6 and stool guaiac positive  and rectal exam- with cookie  done by ED physician as per note   Patient denies any chest pain or worsening of SOB ,no abdominal pain or vomiting ,no diarrhea,no efver or chills    pt comf, vida diet  mild upper abd discomfort and dec with eating  pos bm  neg cp   sob unchanged  neg fever  vida po    PMHx: ::  HTN,  COPD on home O2 2L,  SHAYY on nocturnal BIPAP,  Chronic diastolic CHF,  CAD s/p PCI with stent in 2002,  Last cath in july 2014 with RCA disease medically managed, Hyperlipidemia,  PVD,  Iron deficiency anemia,  Chronic back pain,  Gout,  BPH,  CKD III with baseline Cr 2.2,  PAF not on a/c,  Hx of GI Bleed in the past, hemorrhoids s/p banding,  PSHx: ::  Right rotator cuff repair  pilonidal cyst  Family History: ::  Father: rectal CA  Mother: HTN, DM  3 siblings: DM  Social History: ::  Tobacco: smoked 1ppd X 50 years, quit 22 years ago.  Rare ETOH use.  wife recently passed away last year (21 Mar 2017 06:35)      PAST MEDICAL & SURGICAL HISTORY:  Sepsis, due to unspecified organism: 2/2 poorly healing wounds b/l  BPH (benign prostatic hypertrophy)  Hyperlipemia  Coronary artery disease  Hypertension  Dyspepsia: On moderate exertion.  Sleep apnea, obstructive: Requires home 02 therapy, and treatment with BIPAP  Atelectasis  Pleural effusion, bilateral  Respiratory failure  Peripheral edema  CRI (chronic renal insufficiency)  Gout  CRF (chronic renal failure)  Benign prostatic hypertrophy  Spinal stenosis  Hypercholesterolemia  GERD (gastroesophageal reflux disease)  CAD (coronary artery disease)  Hypertension  S/P angioplasty with stent  Cataract of left eye  Prostate: Surgery green light procedure.  S/P rotator cuff surgery: Right  S/P angioplasty  Rotator cuff tear, right: repair      MEDICATIONS  (STANDING):  finasteride 5milliGRAM(s) Oral daily  buDESOnide   0.5 milliGRAM(s) Respule 0.5milliGRAM(s) Nebulizer two times a day  doxazosin 8milliGRAM(s) Oral at bedtime  isosorbide   mononitrate ER Tablet (IMDUR) 120milliGRAM(s) Oral daily  gabapentin Oral Tab/Cap - Peds 600milliGRAM(s) Oral at bedtime  allopurinol 100milliGRAM(s) Oral daily  loratadine 10milliGRAM(s) Oral daily  fenofibrate Tablet 145milliGRAM(s) Oral daily  simvastatin 10milliGRAM(s) Oral at bedtime  pramipexole 0.125milliGRAM(s) Oral three times a day  metoprolol Oral Tab/Cap - Peds 12.5milliGRAM(s) Oral two times a day  hydrALAZINE 150milliGRAM(s) Oral two times a day  ammonium lactate 12% Lotion 1Application(s) Topical two times a day  diltiazem   CD 240milliGRAM(s) Oral daily  senna 2Tablet(s) Oral at bedtime  glycerin Suppository - Adult 1Suppository(s) Rectal daily  fluticasone propionate 50 MICROgram(s)/spray Nasal Spray 1Spray(s) Both Nostrils two times a day  furosemide    Tablet 40milliGRAM(s) Oral daily  sodium ferric gluconate complex IVPB 125milliGRAM(s) IV Intermittent at bedtime  pantoprazole    Tablet 40milliGRAM(s) Oral two times a day before meals  aspirin  chewable 81milliGRAM(s) Oral daily  simethicone 80milliGRAM(s) Chew two times a day    MEDICATIONS  (PRN):  nitroglycerin     SubLingual 0.4milliGRAM(s) SubLingual every 5 minutes PRN Chest Pain  ondansetron Injectable 4milliGRAM(s) IV Push every 6 hours PRN Nausea and/or Vomiting  diphenhydrAMINE   Elixir 12.5milliGRAM(s) Oral every 6 hours PRN Rash and/or Itching  oxyCODONE IR 5milliGRAM(s) Oral two times a day PRN Severe Pain (7 - 10)  acetaminophen   Tablet 650milliGRAM(s) Oral every 6 hours PRN For Temp greater than 38 C (100.4 F)  aluminum hydroxide/magnesium hydroxide/simethicone Suspension 30milliLiter(s) Oral every 6 hours PRN Dyspepsia      Allergies    No Known Allergies    Intolerances        SOCIAL HISTORY: unchanged    FAMILY HISTORY:  No pertinent family history in first degree relatives      REVIEW OF SYSTEMS:    CONSTITUTIONAL: No weakness, fevers or chills  EYES/ENT: No visual changes;  No vertigo or throat pain   NECK: No pain or stiffness  RESPIRATORY: No cough, wheezing, hemoptysis; No shortness of breath  CARDIOVASCULAR: No chest pain or palpitations  GENITOURINARY: No dysuria, frequency or hematuria  NEUROLOGICAL: No numbness or weakness  SKIN: No itching, burning, rashes, or lesions   All other review of systems is negative unless indicated above.    Vital Signs Last 24 Hrs  T(C): 36.2, Max: 37.6 (04-04 @ 10:30)  T(F): 97.2, Max: 99.6 (04-04 @ 10:30)  HR: 78 (69 - 78)  BP: 147/41 (136/40 - 157/45)  BP(mean): --  RR: 18 (12 - 20)  SpO2: 94% (94% - 97%)    PHYSICAL EXAM:    Constitutional: NAD, well-developed  HEENT: EOMI, throat clear  Neck: No LAD, supple  Respiratory: CTA and P  Cardiovascular: S1 and S2, RRR, no M  Gastrointestinal: BS+, soft, NT/ND, neg HSM,  Extremities: No peripheral edema, neg clubing, cyanosis  Vascular: 2+ peripheral pulses  Neurological: A/O x 3, no focal deficits  Psychiatric: Normal mood, normal affect  Skin: No rashes    LABS:  CBC Full  -  ( 05 Apr 2017 06:49 )  WBC Count : 6.9 K/uL  Hemoglobin : 9.2 g/dL  Hematocrit : 28.2 %  Platelet Count - Automated : 322 K/uL  Mean Cell Volume : 92.5 fl  Mean Cell Hemoglobin : 30.3 pg  Mean Cell Hemoglobin Concentration : 32.7 gm/dL  Auto Neutrophil # : x  Auto Lymphocyte # : x  Auto Monocyte # : x  Auto Eosinophil # : x  Auto Basophil # : x  Auto Neutrophil % : x  Auto Lymphocyte % : x  Auto Monocyte % : x  Auto Eosinophil % : x  Auto Basophil % : x    05 Apr 2017 06:49    148    |  112    |  52     ----------------------------<  105    4.2     |  28     |  1.70     Ca    8.4        05 Apr 2017 06:49  Mg     2.3       05 Apr 2017 06:49              RADIOLOGY & ADDITIONAL STUDIES:EXAM:  US DPLX UPR EXT VEINS LTD RT                            PROCEDURE DATE:  04/04/2017        INTERPRETATION:  Exam Date: 4/4/2017 2:48 PM    Ultrasound of the upper extremity deep venous system    CLINICAL INFORMATION: RUE swelling pain to r/oDVT    TECHNIQUE:   Doppler grayscale and color ultrasonography of the right   upper extremity deep venous system was performed.    FINDINGS:    No previous examinations are available for review.    There is noncompressibility and poor flow within the basilic vein in the   forearm, compatible with deep vein thrombus. There are additional focal   areas of noncompressibility and poor vascular flow in the right cephalic   vein of the mid upper arm and the distal right cephalic vein at the   wrist, compatible with focal superficial vein thrombi. The right internal   jugular vein, right subclavian vein, right axillary vein, right brachial   vein appear patent with normal compressibility and flow.      IMPRESSION:   Positive for deep vein thrombus within the basilic vein in   the forearm. There are additional focal areas of noncompressibility and   poor vascular flow in the right cephalic vein of the mid upper arm and   the distal right cephalic vein at the wrist, compatible with focal   superficial vein thrombi.       Critical value:  I discussed the finding of this report with Dr. Howe   at 3:00 PM on April 4, 2017.  Critical value policy of the hospital was   followed.  Read back and confirmation of receipt of this communication   was performed.  This verbal communication supplements the text report of   this document.              IVETH FIELDS M.D., ATTENDING RADIOLOGIST  This document has been electronically signed. Apr 4 2017  3:05PM

## 2017-04-05 NOTE — PROGRESS NOTE ADULT - ASSESSMENT
81yo male with multiple medical problems with gi bleed  bleeding thought due to Dieulafoy lesion in 4th portion of duodenum s/p endoclip and cautery  also with nonbleeding gastric ulcer, limited bx  continue protonix, bid  vida diet      DVT and IVC filter due to bleed    CAD DW  pt and ASA started    DW cardiology, Dr Lance

## 2017-04-07 DIAGNOSIS — M10.9 GOUT, UNSPECIFIED: ICD-10-CM

## 2017-04-07 DIAGNOSIS — M79.605 PAIN IN LEFT LEG: ICD-10-CM

## 2017-04-07 DIAGNOSIS — D62 ACUTE POSTHEMORRHAGIC ANEMIA: ICD-10-CM

## 2017-04-07 DIAGNOSIS — M12.88 OTHER SPECIFIC ARTHROPATHIES, NOT ELSEWHERE CLASSIFIED, OTHER SPECIFIED SITE: ICD-10-CM

## 2017-04-07 DIAGNOSIS — I73.9 PERIPHERAL VASCULAR DISEASE, UNSPECIFIED: ICD-10-CM

## 2017-04-07 DIAGNOSIS — D50.9 IRON DEFICIENCY ANEMIA, UNSPECIFIED: ICD-10-CM

## 2017-04-07 DIAGNOSIS — B37.81 CANDIDAL ESOPHAGITIS: ICD-10-CM

## 2017-04-07 DIAGNOSIS — N18.4 CHRONIC KIDNEY DISEASE, STAGE 4 (SEVERE): ICD-10-CM

## 2017-04-07 DIAGNOSIS — N40.0 BENIGN PROSTATIC HYPERPLASIA WITHOUT LOWER URINARY TRACT SYMPTOMS: ICD-10-CM

## 2017-04-07 DIAGNOSIS — I50.32 CHRONIC DIASTOLIC (CONGESTIVE) HEART FAILURE: ICD-10-CM

## 2017-04-07 DIAGNOSIS — I12.9 HYPERTENSIVE CHRONIC KIDNEY DISEASE WITH STAGE 1 THROUGH STAGE 4 CHRONIC KIDNEY DISEASE, OR UNSPECIFIED CHRONIC KIDNEY DISEASE: ICD-10-CM

## 2017-04-07 DIAGNOSIS — E86.0 DEHYDRATION: ICD-10-CM

## 2017-04-07 DIAGNOSIS — E11.40 TYPE 2 DIABETES MELLITUS WITH DIABETIC NEUROPATHY, UNSPECIFIED: ICD-10-CM

## 2017-04-07 DIAGNOSIS — Z95.5 PRESENCE OF CORONARY ANGIOPLASTY IMPLANT AND GRAFT: ICD-10-CM

## 2017-04-07 DIAGNOSIS — K92.2 GASTROINTESTINAL HEMORRHAGE, UNSPECIFIED: ICD-10-CM

## 2017-04-07 DIAGNOSIS — E87.0 HYPEROSMOLALITY AND HYPERNATREMIA: ICD-10-CM

## 2017-04-07 DIAGNOSIS — Z85.038 PERSONAL HISTORY OF OTHER MALIGNANT NEOPLASM OF LARGE INTESTINE: ICD-10-CM

## 2017-04-07 DIAGNOSIS — Z87.891 PERSONAL HISTORY OF NICOTINE DEPENDENCE: ICD-10-CM

## 2017-04-07 DIAGNOSIS — I82.621 ACUTE EMBOLISM AND THROMBOSIS OF DEEP VEINS OF RIGHT UPPER EXTREMITY: ICD-10-CM

## 2017-04-07 DIAGNOSIS — N17.0 ACUTE KIDNEY FAILURE WITH TUBULAR NECROSIS: ICD-10-CM

## 2017-04-07 DIAGNOSIS — Z79.899 OTHER LONG TERM (CURRENT) DRUG THERAPY: ICD-10-CM

## 2017-04-07 DIAGNOSIS — Z99.81 DEPENDENCE ON SUPPLEMENTAL OXYGEN: ICD-10-CM

## 2017-04-07 DIAGNOSIS — I82.441 ACUTE EMBOLISM AND THROMBOSIS OF RIGHT TIBIAL VEIN: ICD-10-CM

## 2017-04-07 DIAGNOSIS — I87.2 VENOUS INSUFFICIENCY (CHRONIC) (PERIPHERAL): ICD-10-CM

## 2017-04-07 DIAGNOSIS — J44.9 CHRONIC OBSTRUCTIVE PULMONARY DISEASE, UNSPECIFIED: ICD-10-CM

## 2017-04-07 DIAGNOSIS — G47.33 OBSTRUCTIVE SLEEP APNEA (ADULT) (PEDIATRIC): ICD-10-CM

## 2017-04-07 DIAGNOSIS — E78.5 HYPERLIPIDEMIA, UNSPECIFIED: ICD-10-CM

## 2017-04-07 DIAGNOSIS — I25.10 ATHEROSCLEROTIC HEART DISEASE OF NATIVE CORONARY ARTERY WITHOUT ANGINA PECTORIS: ICD-10-CM

## 2017-04-07 DIAGNOSIS — K25.9 GASTRIC ULCER, UNSPECIFIED AS ACUTE OR CHRONIC, WITHOUT HEMORRHAGE OR PERFORATION: ICD-10-CM

## 2017-04-07 DIAGNOSIS — I48.0 PAROXYSMAL ATRIAL FIBRILLATION: ICD-10-CM

## 2017-04-07 DIAGNOSIS — J96.12 CHRONIC RESPIRATORY FAILURE WITH HYPERCAPNIA: ICD-10-CM

## 2017-04-10 DIAGNOSIS — I13.0 HYPERTENSIVE HEART AND CHRONIC KIDNEY DISEASE WITH HEART FAILURE AND STAGE 1 THROUGH STAGE 4 CHRONIC KIDNEY DISEASE, OR UNSPECIFIED CHRONIC KIDNEY DISEASE: ICD-10-CM

## 2017-05-04 ENCOUNTER — INPATIENT (INPATIENT)
Facility: HOSPITAL | Age: 82
LOS: 19 days | Discharge: NURSING, REHAB OR LONG-TERM CARE | End: 2017-05-24
Attending: INTERNAL MEDICINE | Admitting: FAMILY MEDICINE
Payer: MEDICARE

## 2017-05-04 VITALS
RESPIRATION RATE: 20 BRPM | TEMPERATURE: 98 F | HEART RATE: 115 BPM | SYSTOLIC BLOOD PRESSURE: 145 MMHG | DIASTOLIC BLOOD PRESSURE: 51 MMHG | OXYGEN SATURATION: 100 %

## 2017-05-04 DIAGNOSIS — A41.9 SEPSIS, UNSPECIFIED ORGANISM: ICD-10-CM

## 2017-05-04 DIAGNOSIS — Z95.9 PRESENCE OF CARDIAC AND VASCULAR IMPLANT AND GRAFT, UNSPECIFIED: Chronic | ICD-10-CM

## 2017-05-04 DIAGNOSIS — N17.9 ACUTE KIDNEY FAILURE, UNSPECIFIED: ICD-10-CM

## 2017-05-04 DIAGNOSIS — I82.501 CHRONIC EMBOLISM AND THROMBOSIS OF UNSPECIFIED DEEP VEINS OF RIGHT LOWER EXTREMITY: ICD-10-CM

## 2017-05-04 DIAGNOSIS — I10 ESSENTIAL (PRIMARY) HYPERTENSION: ICD-10-CM

## 2017-05-04 DIAGNOSIS — I25.10 ATHEROSCLEROTIC HEART DISEASE OF NATIVE CORONARY ARTERY WITHOUT ANGINA PECTORIS: ICD-10-CM

## 2017-05-04 DIAGNOSIS — D50.0 IRON DEFICIENCY ANEMIA SECONDARY TO BLOOD LOSS (CHRONIC): ICD-10-CM

## 2017-05-04 LAB
ADD ON TEST-SPECIMEN IN LAB: SIGNIFICANT CHANGE UP
ALBUMIN SERPL ELPH-MCNC: 2.4 G/DL — LOW (ref 3.3–5)
ALP SERPL-CCNC: 72 U/L — SIGNIFICANT CHANGE UP (ref 40–120)
ALT FLD-CCNC: 15 U/L — SIGNIFICANT CHANGE UP (ref 12–78)
ANION GAP SERPL CALC-SCNC: 14 MMOL/L — SIGNIFICANT CHANGE UP (ref 5–17)
ANISOCYTOSIS BLD QL: SLIGHT — SIGNIFICANT CHANGE UP
APPEARANCE UR: CLEAR — SIGNIFICANT CHANGE UP
APTT BLD: 38.8 SEC — HIGH (ref 27.5–37.4)
AST SERPL-CCNC: 20 U/L — SIGNIFICANT CHANGE UP (ref 15–37)
BACTERIA # UR AUTO: (no result)
BASO STIPL BLD QL SMEAR: SLIGHT — SIGNIFICANT CHANGE UP
BASOPHILS # BLD AUTO: 0.1 K/UL — SIGNIFICANT CHANGE UP (ref 0–0.2)
BASOPHILS NFR BLD AUTO: 0.7 % — SIGNIFICANT CHANGE UP (ref 0–2)
BILIRUB SERPL-MCNC: 0.2 MG/DL — SIGNIFICANT CHANGE UP (ref 0.2–1.2)
BILIRUB UR-MCNC: (no result)
BLD GP AB SCN SERPL QL: SIGNIFICANT CHANGE UP
BUN SERPL-MCNC: 138 MG/DL — HIGH (ref 7–23)
CALCIUM SERPL-MCNC: 8.8 MG/DL — SIGNIFICANT CHANGE UP (ref 8.5–10.1)
CHLORIDE SERPL-SCNC: 112 MMOL/L — HIGH (ref 96–108)
CK SERPL-CCNC: 99 U/L — SIGNIFICANT CHANGE UP (ref 26–308)
CO2 SERPL-SCNC: 19 MMOL/L — LOW (ref 22–31)
COLOR SPEC: YELLOW — SIGNIFICANT CHANGE UP
CREAT SERPL-MCNC: 3.99 MG/DL — HIGH (ref 0.5–1.3)
DIFF PNL FLD: NEGATIVE — SIGNIFICANT CHANGE UP
EOSINOPHIL # BLD AUTO: 0.1 K/UL — SIGNIFICANT CHANGE UP (ref 0–0.5)
EOSINOPHIL NFR BLD AUTO: 0.7 % — SIGNIFICANT CHANGE UP (ref 0–6)
EPI CELLS # UR: SIGNIFICANT CHANGE UP
GLUCOSE SERPL-MCNC: 123 MG/DL — HIGH (ref 70–99)
GLUCOSE UR QL: NEGATIVE MG/DL — SIGNIFICANT CHANGE UP
HCT VFR BLD CALC: 21.8 % — LOW (ref 39–50)
HGB BLD-MCNC: 6.3 G/DL — CRITICAL LOW (ref 13–17)
HYPOCHROMIA BLD QL: SLIGHT — SIGNIFICANT CHANGE UP
INR BLD: 1.25 RATIO — HIGH (ref 0.88–1.16)
KETONES UR-MCNC: NEGATIVE — SIGNIFICANT CHANGE UP
LACTATE SERPL-SCNC: 2.2 MMOL/L — HIGH (ref 0.7–2)
LEUKOCYTE ESTERASE UR-ACNC: (no result)
LYMPHOCYTES # BLD AUTO: 0.3 K/UL — LOW (ref 1–3.3)
LYMPHOCYTES # BLD AUTO: 2.2 % — LOW (ref 13–44)
MACROCYTES BLD QL: SLIGHT — SIGNIFICANT CHANGE UP
MANUAL DIF COMMENT BLD-IMP: SIGNIFICANT CHANGE UP
MCHC RBC-ENTMCNC: 28 PG — SIGNIFICANT CHANGE UP (ref 27–34)
MCHC RBC-ENTMCNC: 29.1 GM/DL — LOW (ref 32–36)
MCV RBC AUTO: 96.4 FL — SIGNIFICANT CHANGE UP (ref 80–100)
MONOCYTES # BLD AUTO: 1 K/UL — HIGH (ref 0–0.9)
MONOCYTES NFR BLD AUTO: 8.4 % — SIGNIFICANT CHANGE UP (ref 2–14)
NEUTROPHILS # BLD AUTO: 10.2 K/UL — HIGH (ref 1.8–7.4)
NEUTROPHILS NFR BLD AUTO: 88 % — HIGH (ref 43–77)
NITRITE UR-MCNC: NEGATIVE — SIGNIFICANT CHANGE UP
PH UR: 5 — SIGNIFICANT CHANGE UP (ref 5–8)
PLAT MORPH BLD: NORMAL — SIGNIFICANT CHANGE UP
PLATELET # BLD AUTO: 398 K/UL — SIGNIFICANT CHANGE UP (ref 150–400)
POIKILOCYTOSIS BLD QL AUTO: SLIGHT — SIGNIFICANT CHANGE UP
POLYCHROMASIA BLD QL SMEAR: SLIGHT — SIGNIFICANT CHANGE UP
POTASSIUM SERPL-MCNC: 5.5 MMOL/L — HIGH (ref 3.5–5.3)
POTASSIUM SERPL-SCNC: 5.5 MMOL/L — HIGH (ref 3.5–5.3)
PROT SERPL-MCNC: 6.6 GM/DL — SIGNIFICANT CHANGE UP (ref 6–8.3)
PROT UR-MCNC: 15 MG/DL
PROTHROM AB SERPL-ACNC: 13.6 SEC — HIGH (ref 9.8–12.7)
RBC # BLD: 2.26 M/UL — LOW (ref 4.2–5.8)
RBC # FLD: 19.3 % — HIGH (ref 10.3–14.5)
RBC BLD AUTO: (no result)
RBC CASTS # UR COMP ASSIST: SIGNIFICANT CHANGE UP /HPF (ref 0–4)
SODIUM SERPL-SCNC: 145 MMOL/L — SIGNIFICANT CHANGE UP (ref 135–145)
SP GR SPEC: 1.02 — SIGNIFICANT CHANGE UP (ref 1.01–1.02)
TROPONIN I SERPL-MCNC: <0.015 NG/ML — SIGNIFICANT CHANGE UP (ref 0.01–0.04)
TYPE + AB SCN PNL BLD: SIGNIFICANT CHANGE UP
UROBILINOGEN FLD QL: NEGATIVE MG/DL — SIGNIFICANT CHANGE UP
WBC # BLD: 10.8 K/UL — HIGH (ref 3.8–10.5)
WBC # FLD AUTO: 10.8 K/UL — HIGH (ref 3.8–10.5)
WBC UR QL: SIGNIFICANT CHANGE UP

## 2017-05-04 PROCEDURE — 93010 ELECTROCARDIOGRAM REPORT: CPT

## 2017-05-04 PROCEDURE — 99291 CRITICAL CARE FIRST HOUR: CPT

## 2017-05-04 PROCEDURE — 71010: CPT | Mod: 26

## 2017-05-04 RX ORDER — SOD,AMMONIUM,POTASSIUM LACTATE
1 CREAM (GRAM) TOPICAL
Qty: 0 | Refills: 0 | Status: DISCONTINUED | OUTPATIENT
Start: 2017-05-04 | End: 2017-05-11

## 2017-05-04 RX ORDER — SODIUM CHLORIDE 9 MG/ML
1000 INJECTION INTRAMUSCULAR; INTRAVENOUS; SUBCUTANEOUS ONCE
Qty: 0 | Refills: 0 | Status: COMPLETED | OUTPATIENT
Start: 2017-05-04 | End: 2017-05-04

## 2017-05-04 RX ORDER — DILTIAZEM HCL 120 MG
120 CAPSULE, EXT RELEASE 24 HR ORAL DAILY
Qty: 0 | Refills: 0 | Status: DISCONTINUED | OUTPATIENT
Start: 2017-05-04 | End: 2017-05-11

## 2017-05-04 RX ORDER — HYDROMORPHONE HYDROCHLORIDE 2 MG/ML
0.5 INJECTION INTRAMUSCULAR; INTRAVENOUS; SUBCUTANEOUS ONCE
Qty: 0 | Refills: 0 | Status: DISCONTINUED | OUTPATIENT
Start: 2017-05-04 | End: 2017-05-04

## 2017-05-04 RX ORDER — CEFEPIME 1 G/1
1000 INJECTION, POWDER, FOR SOLUTION INTRAMUSCULAR; INTRAVENOUS DAILY
Qty: 0 | Refills: 0 | Status: DISCONTINUED | OUTPATIENT
Start: 2017-05-04 | End: 2017-05-11

## 2017-05-04 RX ORDER — TRAMADOL HYDROCHLORIDE 50 MG/1
100 TABLET ORAL EVERY 6 HOURS
Qty: 0 | Refills: 0 | Status: DISCONTINUED | OUTPATIENT
Start: 2017-05-04 | End: 2017-05-11

## 2017-05-04 RX ORDER — BUDESONIDE, MICRONIZED 100 %
0.5 POWDER (GRAM) MISCELLANEOUS
Qty: 0 | Refills: 0 | Status: DISCONTINUED | OUTPATIENT
Start: 2017-05-04 | End: 2017-05-11

## 2017-05-04 RX ORDER — GABAPENTIN 400 MG/1
600 CAPSULE ORAL AT BEDTIME
Qty: 0 | Refills: 0 | Status: DISCONTINUED | OUTPATIENT
Start: 2017-05-04 | End: 2017-05-04

## 2017-05-04 RX ORDER — ACETAMINOPHEN 500 MG
650 TABLET ORAL EVERY 6 HOURS
Qty: 0 | Refills: 0 | Status: DISCONTINUED | OUTPATIENT
Start: 2017-05-04 | End: 2017-05-06

## 2017-05-04 RX ORDER — SIMVASTATIN 20 MG/1
10 TABLET, FILM COATED ORAL AT BEDTIME
Qty: 0 | Refills: 0 | Status: DISCONTINUED | OUTPATIENT
Start: 2017-05-04 | End: 2017-05-11

## 2017-05-04 RX ORDER — FLUTICASONE PROPIONATE 50 MCG
1 SPRAY, SUSPENSION NASAL
Qty: 0 | Refills: 0 | Status: DISCONTINUED | OUTPATIENT
Start: 2017-05-04 | End: 2017-05-11

## 2017-05-04 RX ORDER — ONDANSETRON 8 MG/1
4 TABLET, FILM COATED ORAL EVERY 6 HOURS
Qty: 0 | Refills: 0 | Status: DISCONTINUED | OUTPATIENT
Start: 2017-05-04 | End: 2017-05-11

## 2017-05-04 RX ORDER — GABAPENTIN 400 MG/1
300 CAPSULE ORAL DAILY
Qty: 0 | Refills: 0 | Status: DISCONTINUED | OUTPATIENT
Start: 2017-05-04 | End: 2017-05-11

## 2017-05-04 RX ORDER — FUROSEMIDE 40 MG
40 TABLET ORAL ONCE
Qty: 0 | Refills: 0 | Status: COMPLETED | OUTPATIENT
Start: 2017-05-04 | End: 2017-05-04

## 2017-05-04 RX ORDER — PRAMIPEXOLE DIHYDROCHLORIDE 0.12 MG/1
0.12 TABLET ORAL THREE TIMES A DAY
Qty: 0 | Refills: 0 | Status: DISCONTINUED | OUTPATIENT
Start: 2017-05-04 | End: 2017-05-11

## 2017-05-04 RX ORDER — GABAPENTIN 400 MG/1
600 CAPSULE ORAL AT BEDTIME
Qty: 0 | Refills: 0 | Status: DISCONTINUED | OUTPATIENT
Start: 2017-05-04 | End: 2017-05-11

## 2017-05-04 RX ORDER — LIDOCAINE 4 G/100G
1 CREAM TOPICAL
Qty: 0 | Refills: 0 | COMMUNITY

## 2017-05-04 RX ORDER — PANTOPRAZOLE SODIUM 20 MG/1
8 TABLET, DELAYED RELEASE ORAL
Qty: 80 | Refills: 0 | Status: DISCONTINUED | OUTPATIENT
Start: 2017-05-04 | End: 2017-05-11

## 2017-05-04 RX ORDER — ALBUTEROL 90 UG/1
2 AEROSOL, METERED ORAL EVERY 6 HOURS
Qty: 0 | Refills: 0 | Status: DISCONTINUED | OUTPATIENT
Start: 2017-05-04 | End: 2017-05-11

## 2017-05-04 RX ORDER — ALLOPURINOL 300 MG
100 TABLET ORAL DAILY
Qty: 0 | Refills: 0 | Status: DISCONTINUED | OUTPATIENT
Start: 2017-05-04 | End: 2017-05-11

## 2017-05-04 RX ORDER — ISOSORBIDE MONONITRATE 60 MG/1
120 TABLET, EXTENDED RELEASE ORAL DAILY
Qty: 0 | Refills: 0 | Status: DISCONTINUED | OUTPATIENT
Start: 2017-05-04 | End: 2017-05-11

## 2017-05-04 RX ORDER — HYDRALAZINE HCL 50 MG
150 TABLET ORAL
Qty: 0 | Refills: 0 | Status: DISCONTINUED | OUTPATIENT
Start: 2017-05-04 | End: 2017-05-11

## 2017-05-04 RX ORDER — PANTOPRAZOLE SODIUM 20 MG/1
80 TABLET, DELAYED RELEASE ORAL ONCE
Qty: 0 | Refills: 0 | Status: COMPLETED | OUTPATIENT
Start: 2017-05-04 | End: 2017-05-04

## 2017-05-04 RX ORDER — VANCOMYCIN HCL 1 G
1000 VIAL (EA) INTRAVENOUS ONCE
Qty: 0 | Refills: 0 | Status: COMPLETED | OUTPATIENT
Start: 2017-05-04 | End: 2017-05-04

## 2017-05-04 RX ORDER — DOXAZOSIN MESYLATE 4 MG
8 TABLET ORAL AT BEDTIME
Qty: 0 | Refills: 0 | Status: DISCONTINUED | OUTPATIENT
Start: 2017-05-04 | End: 2017-05-11

## 2017-05-04 RX ORDER — METOPROLOL TARTRATE 50 MG
12.5 TABLET ORAL
Qty: 0 | Refills: 0 | Status: DISCONTINUED | OUTPATIENT
Start: 2017-05-04 | End: 2017-05-11

## 2017-05-04 RX ORDER — CEFEPIME 1 G/1
1000 INJECTION, POWDER, FOR SOLUTION INTRAMUSCULAR; INTRAVENOUS ONCE
Qty: 0 | Refills: 0 | Status: COMPLETED | OUTPATIENT
Start: 2017-05-04 | End: 2017-05-04

## 2017-05-04 RX ORDER — FENOFIBRATE,MICRONIZED 130 MG
145 CAPSULE ORAL DAILY
Qty: 0 | Refills: 0 | Status: DISCONTINUED | OUTPATIENT
Start: 2017-05-04 | End: 2017-05-11

## 2017-05-04 RX ADMIN — Medication 1 SPRAY(S): at 21:51

## 2017-05-04 RX ADMIN — PRAMIPEXOLE DIHYDROCHLORIDE 0.12 MILLIGRAM(S): 0.12 TABLET ORAL at 21:52

## 2017-05-04 RX ADMIN — SIMVASTATIN 10 MILLIGRAM(S): 20 TABLET, FILM COATED ORAL at 21:52

## 2017-05-04 RX ADMIN — Medication 12.5 MILLIGRAM(S): at 21:52

## 2017-05-04 RX ADMIN — HYDROMORPHONE HYDROCHLORIDE 0.5 MILLIGRAM(S): 2 INJECTION INTRAMUSCULAR; INTRAVENOUS; SUBCUTANEOUS at 13:40

## 2017-05-04 RX ADMIN — GABAPENTIN 600 MILLIGRAM(S): 400 CAPSULE ORAL at 21:51

## 2017-05-04 RX ADMIN — TRAMADOL HYDROCHLORIDE 100 MILLIGRAM(S): 50 TABLET ORAL at 22:50

## 2017-05-04 RX ADMIN — Medication 8 MILLIGRAM(S): at 21:52

## 2017-05-04 RX ADMIN — Medication 40 MILLIGRAM(S): at 17:50

## 2017-05-04 RX ADMIN — PANTOPRAZOLE SODIUM 80 MILLIGRAM(S): 20 TABLET, DELAYED RELEASE ORAL at 15:50

## 2017-05-04 RX ADMIN — PANTOPRAZOLE SODIUM 10 MG/HR: 20 TABLET, DELAYED RELEASE ORAL at 16:52

## 2017-05-04 RX ADMIN — Medication 650 MILLIGRAM(S): at 22:57

## 2017-05-04 RX ADMIN — Medication 150 MILLIGRAM(S): at 21:52

## 2017-05-04 RX ADMIN — HYDROMORPHONE HYDROCHLORIDE 0.5 MILLIGRAM(S): 2 INJECTION INTRAMUSCULAR; INTRAVENOUS; SUBCUTANEOUS at 15:50

## 2017-05-04 RX ADMIN — CEFEPIME 100 MILLIGRAM(S): 1 INJECTION, POWDER, FOR SOLUTION INTRAMUSCULAR; INTRAVENOUS at 13:40

## 2017-05-04 RX ADMIN — TRAMADOL HYDROCHLORIDE 100 MILLIGRAM(S): 50 TABLET ORAL at 21:52

## 2017-05-04 RX ADMIN — Medication 0.5 MILLIGRAM(S): at 20:54

## 2017-05-04 RX ADMIN — Medication 250 MILLIGRAM(S): at 14:05

## 2017-05-04 RX ADMIN — SODIUM CHLORIDE 1000 MILLILITER(S): 9 INJECTION INTRAMUSCULAR; INTRAVENOUS; SUBCUTANEOUS at 13:39

## 2017-05-04 NOTE — ED PROVIDER NOTE - PROGRESS NOTE DETAILS
ED attending paged Dr Hernández. ED attending spoke with pt family regarding blood transfusion who states that pt has had them in the past before and aware of risks/benefits. ED attending conducted rectal exam showing black stool, Guaiac heme positive lot [id number]. ED attending conducted rectal exam showing black stool, Guaiac heme positive lot [id number], QC reactive. Scribe South: discussed with GI Dr. Hernández, will follow suggested serial H&H as pt with chronic black stools. I, Alessio Dia, was the scribe for and in the presence of Dr. Knight for this patient for the following sections: Progress Note, Disposition.

## 2017-05-04 NOTE — PROGRESS NOTE ADULT - ASSESSMENT
82 y/o male with PMHx of COPD, CHF, chronic renal failure, paroxysmal A-fib.with last hospitaliztion 4/2017 for hematochezia , In Ed patient noted to have Hb 6 and stool guaiac positive  and rectal exam- with cookie.. Pt had a complicated course with dvt R arm and R leg, s/p umbrella filter.. Pt has bilateral leg wounds which started to heal promptly with diureses and removal of edema.. Pts creat improved to 1.7 mg/dl although he was getting diuresed. Pt was dc to rehab where he was further diuresed as per family and was given fluids to correct dehydration. yesterday had debridement of the legs and now pt in ER w anemia, hgb 6+, creat 4..Pt got dilaudid due to leg pain and is lethargic..    a/p  CKD 3-4  leila due to multifactorial events, diuresis, hypoperfusion due to anemia  pt to be transfused, 2 u prbc,  lasix 40 mg IV  bladder scan  monitor K, creat, BP  consult Dr Clement for evaluation of legs  Dr Toro for GI eval  d/w Med staff, d/w Pt daughter

## 2017-05-04 NOTE — ED PROVIDER NOTE - SECONDARY DIAGNOSIS.
Gastrointestinal hemorrhage, unspecified gastrointestinal hemorrhage type Cellulitis, unspecified cellulitis site

## 2017-05-04 NOTE — ED PROVIDER NOTE - CONSTITUTIONAL, MLM
Well appearing, obese male, awake, alert, oriented to person, place, time/situation and in no apparent distress. normal...

## 2017-05-04 NOTE — ED ADULT NURSE REASSESSMENT NOTE - NS ED NURSE REASSESS COMMENT FT1
report given to floor pt has platelts infusing pt toleratine well . vital signs stable . no s/s of acute distress. awaiting for transport

## 2017-05-04 NOTE — H&P ADULT - NSHPPHYSICALEXAM_GEN_ALL_CORE
PHYSICAL EXAM:      Constitutional: Moderate distress and disorientation , obese, ill groomed  HEENT: PERRLA, EOMI,  Neck: No LAD, No JVD  Back: Normal spine flexure, No CVA tenderness  Respiratory: CTABL  Cardiovascular: S1 and S2, RRR, no M/G/R  Gastrointestinal: BS+, soft, mild suprapubic tenderness  Extremities: b/l LE edema with multiple ulcers. RLE more swollen and colder to touch compared to LLE  Vascular: poor pulses  Neurological: A/O x 1, moving all limbs  Psychiatric: in pain  Skin: ulcers as above

## 2017-05-04 NOTE — CONSULT NOTE ADULT - SUBJECTIVE AND OBJECTIVE BOX
Patient is a 82y old  Male who presents with a chief complaint of weakness  HPI:  82 y/o male with h/of COPD, CHF, chronic renal failure, paroxysmal A-fib.with, hematochezia s/p bleeding scan and flex sigmoidoscopy, anemia, chronic LE stasis dermatitis  was was admitted for increased weakness and wheepy right LE and foot. The patient had a RLE ABDULAZIZ boot for chronic venous stasis ulcers and lower extremity chronic edema that was removed recently.. The daughter reports increased oozing from LE ulcers; she reports thet the right foot ulcers were debrided the day PTA. No fever or chills reported. In Ed patient noted to have Hb 6. He received vancomycin 1 gm IV x 1 and cefepime 1 gm IV x 1.    PMHx: ::  HTN,  COPD on home O2 2L,  SHAYY on nocturnal BIPAP,  Chronic diastolic CHF,  CAD s/p PCI with stent in 2002,  Last cath in july 2014 with RCA disease medically managed, Hyperlipidemia,  PVD,  Iron deficiency anemia,  Chronic back pain,  Gout,  BPH,  CKD III with baseline Cr 2.2,  PAF not on a/c,  Hx of GI Bleed in the past, hemorrhoids s/p banding,  PSHx: ::  Right rotator cuff repair  pilonidal cyst  Family History: ::  Father: rectal CA  Mother: HTN, DM  3 siblings: DM  Social History: ::  Tobacco: smoked 1ppd X 50 years, quit 22 years ago.  Rare ETOH use.  wife recently passed away last year    Meds: per reconciliation sheet, noted below  MEDICATIONS  (STANDING):  pantoprazole Infusion 8mG/Hr IV Continuous <Continuous>  buDESOnide   0.5 milliGRAM(s) Respule 0.5milliGRAM(s) Inhalation two times a day  doxazosin 8milliGRAM(s) Oral at bedtime  isosorbide   mononitrate ER Tablet (IMDUR) 120milliGRAM(s) Oral daily  diltiazem   CD 120milliGRAM(s) Oral daily  gabapentin 600milliGRAM(s) Oral at bedtime  gabapentin 300milliGRAM(s) Oral daily  allopurinol 100milliGRAM(s) Oral daily  fenofibrate Tablet 145milliGRAM(s) Oral daily  simvastatin 10milliGRAM(s) Oral at bedtime  pramipexole 0.125milliGRAM(s) Oral three times a day  ALBUTerol    90 MICROgram(s) HFA Inhaler 2Puff(s) Inhalation every 6 hours  ammonium lactate 12% Lotion 1Application(s) Topical two times a day  hydrALAZINE 150milliGRAM(s) Oral two times a day  fluticasone propionate 50 MICROgram(s)/spray Nasal Spray 1Spray(s) Alternating Nostrils two times a day  metoprolol 12.5milliGRAM(s) Oral two times a day  furosemide   Injectable 40milliGRAM(s) IV Push once  cefepime  IVPB 1000milliGRAM(s) IV Intermittent daily    MEDICATIONS  (PRN):  acetaminophen   Tablet 650milliGRAM(s) Oral every 6 hours PRN For Temp greater than 38 C (100.4 F)  traMADol 100milliGRAM(s) Oral every 6 hours PRN Moderate Pain (4 - 6)  aluminum hydroxide/magnesium hydroxide/simethicone Suspension 30milliLiter(s) Oral every 6 hours PRN Dyspepsia  ondansetron Injectable 4milliGRAM(s) IV Push every 6 hours PRN Nausea    Allergies    No Known Allergies    Intolerances    ROS: the patient is confused; unable to obtain    Vital Signs Last 24 Hrs  T(C): 36.6, Max: 36.8 (05-04 @ 13:10)  T(F): 97.9, Max: 98.2 (05-04 @ 13:10)  HR: 102 (102 - 115)  BP: 146/44 (145/51 - 146/44)  BP(mean): --  RR: 16 (16 - 20)  SpO2: 96% (96% - 100%)  Daily     Daily     PE:    Constitutional: frail looking  HEENT: NC/AT, EOMI, PERRLA  Neck: supple  Back: no tenderness  Respiratory: few basal rales  Cardiovascular: S1S2 regular, no murmurs  Abdomen: soft, not tender, not distended, positive BS  Genitourinary: deferred  Rectal: deferred  Musculoskeletal: no muscle tenderness, no joint swelling or tenderness  Extremities: b/l chronic skin changes with edema; RLE: dusky and colder foot; dorsal aspect of foot and lower ankle superficial ulcers with serous discharge; surrounding erythema and edema  Neurological: confused, moving all extremities, no focal deficits  Skin: no rashes    Labs:                        6.3    10.8  )-----------( 398      ( 04 May 2017 12:54 )             21.8     05-04    145  |  112<H>  |  138<H>  ----------------------------<  123<H>  5.5<H>   |  19<L>  |  3.99<H>    Ca    8.8      04 May 2017 12:54    TPro  6.6  /  Alb  2.4<L>  /  TBili  0.2  /  DBili  x   /  AST  20  /  ALT  15  /  AlkPhos  72  05-04     LIVER FUNCTIONS - ( 04 May 2017 12:54 )  Alb: 2.4 g/dL / Pro: 6.6 gm/dL / ALK PHOS: 72 U/L / ALT: 15 U/L / AST: 20 U/L / GGT: x                 Radiology:    Advanced directives addressed: full resuscitation

## 2017-05-04 NOTE — H&P ADULT - PROBLEM SELECTOR PLAN 1
..  - Lactate 2.2, WBC elevated, chronic lower extremity ulcers, ARYA   - ID consult appreciated in ER  - Antibiotics stared, f/u culture  - Wound care as per vascular consult

## 2017-05-04 NOTE — PROGRESS NOTE ADULT - SUBJECTIVE AND OBJECTIVE BOX
NEPHROLOGY CONSULT     82 y/o male with PMHx of COPD, CHF, chronic renal failure, paroxysmal A-fib.with last hospitaliztion 4/2017 for hematochezia , In Ed patient noted to have Hb 6 and stool guaiac positive  and rectal exam- with cookie.. Pt had a complicated course with dvt R arm and R leg, s/p umbrella filter.. Pt has bilateral leg wounds which started to heal promptly with diureses and removal of edema.. Pts creat improved to 1.7 mg/dl although he was getting diuresed. Pt was dc to rehab where he was further diuresed as per family and was given fluids to correct dehydration. yesterday had debridement of the legs and now pt in ER w anemia, hgb 6+, creat 4..Pt got dilaudid due to leg pain and is lethargic..      PAST MEDICAL & SURGICAL HISTORY:  Sepsis, due to unspecified organism: 2/2 poorly healing wounds b/l  BPH (benign prostatic hypertrophy)  Hyperlipemia  Coronary artery disease  Hypertension  Dyspepsia: On moderate exertion.  Sleep apnea, obstructive: Requires home 02 therapy, and treatment with BIPAP  Atelectasis  Pleural effusion, bilateral  Respiratory failure  Peripheral edema  CRI (chronic renal insufficiency)  Gout  CRF (chronic renal failure)  Benign prostatic hypertrophy  Spinal stenosis  Hypercholesterolemia  GERD (gastroesophageal reflux disease)  CAD (coronary artery disease)  Hypertension  S/P angioplasty with stent  Cataract of left eye  Prostate: Surgery green light procedure.  S/P rotator cuff surgery: Right  S/P angioplasty  Rotator cuff tear, right: repair      FAMILY HISTORY:  No pertinent family history in first degree relatives      MEDICATIONS  (STANDING):  pantoprazole Infusion 8mG/Hr IV Continuous <Continuous>        Allergies    No Known Allergies    Intolerances        I&O's Summary        REVIEW OF SYSTEMS:    CONSTITUTIONAL:  As per HPI.        Vital Signs Last 24 Hrs  T(C): 36.6, Max: 36.8 (05-04 @ 13:10)  T(F): 97.9, Max: 98.2 (05-04 @ 13:10)  HR: 102 (102 - 115)  BP: 146/44 (145/51 - 146/44)  BP(mean): --  RR: 16 (16 - 20)  SpO2: 96% (96% - 100%)  Daily     Daily   I&O's Summary      PHYSICAL EXAM:    General:  Alert, well-developed ,No acute distress.    Neuro:  lethargic due to dilaudid, arousable,     HEENT:  No JVD, no masses, Eyes anicteric, No carotid bruits.No lymphadenopathy,    Cardiovascular:  Regular rate and rhythm, with normal S1 and S2. No murmurs, rubs,  or gallops. No JVD.     Lungs:  clear. no rales, no wheezing, .    Abdomen:  abd distended, + bs, some LLQ tenderness, + s/p tenderness    Skin:  Warm, dry, well-perfused. No rashes or other lesions.     Extremities:  scaling macerated, oozing    LABS:                        6.3    10.8  )-----------( 398      ( 04 May 2017 12:54 )             21.8     05-04    145  |  112<H>  |  138<H>  ----------------------------<  123<H>  5.5<H>   |  19<L>  |  3.99<H>    Ca    8.8      04 May 2017 12:54    TPro  6.6  /  Alb  2.4<L>  /  TBili  0.2  /  DBili  x   /  AST  20  /  ALT  15  /  AlkPhos  72  05-04    PT/INR - ( 04 May 2017 12:54 )   PT: 13.6 sec;   INR: 1.25 ratio         PTT - ( 04 May 2017 12:54 )  PTT:38.8 sec

## 2017-05-04 NOTE — ED ADULT NURSE NOTE - OBJECTIVE STATEMENT
pt presents to ed for eval of abnormal labs sent by nursing home . pt has a gistory of renal failure but has been monitored and with recent blod work pt has increase in creat. pt also presnts with venous statsus ulcers bilat in lower legs . as per daughters ulcers are geetting worse especially on the right side , wound ilarge and draining large amount of serous fluid no odor . pain and increase in reddness in right leg .pt denies fever or chills pt has a history of dvt in right leg and right arm . pt appears uncomfortable . skin color pale and warm history of gi blled with clipping done in march . pt denies any cp or sob Md chapman at bedside monitor in place . will continue to observe sepsis protocol intitated

## 2017-05-04 NOTE — H&P ADULT - PROBLEM SELECTOR PLAN 5
..  - Multiple DVTs RLE RUE  - s/p IVC filter   - Not a candidate for anticoagulation due to recurrent GI bleeding

## 2017-05-04 NOTE — H&P ADULT - ASSESSMENT
82 year old male with history of CAD s/p stents (LAD, RCA bare metal around 9/16), A.Fib not on anticoagulation due to GI bleeding, Hypertension, COPD on home O2 2L, SHAYY on nocturnal BIPAP, ex-smoker (smoked 1ppd X 50 years, quit 22 years ago),  Chronic diastolic CHF, Hyperlipidemia, PVD, Iron deficiency anemia, Chronic back pain, Gout, BPH, CKD III with baseline Cr 2, recently admitted to  in 4/17 with anemia of GI bleeding, RLE and RUE DVTs, received pRBCs and IVC filter discharged to rehab with aspirin was sent to  ER on 5/4/17 for worsening anemia with Hb 6, worsening leg pain from ulcers and worsening renal function Cr 3.99.

## 2017-05-04 NOTE — H&P ADULT - HISTORY OF PRESENT ILLNESS
82 year old male with history of CAD s/p stents (LAD, RCA bare metal around 9/16), A.Fib not on anticoagulation due to GI bleeding, Hypertension, COPD on home O2 2L, SHAYY on nocturnal BIPAP, ex-smoker (smoked 1ppd X 50 years, quit 22 years ago),  Chronic diastolic CHF, Hyperlipidemia, PVD, Iron deficiency anemia, Chronic back pain, Gout, BPH, CKD III with baseline Cr 2, recently admitted to  in 4/17 with anemia of GI bleeding, RLE and RUE DVTs, received pRBCs and IVC filter discharged to rehab with aspirin was sent to  ER on 5/4/17 for worsening anemia with Hb 6, worsening leg pain from ulcers and worsening renal function Cr 3.99.    Pt seen bed side.  Not well oriented currently after receiving Dilaudid for pain in ER.  As per daughters, pt was walking with a walker at rehab and was doing better.  Pt was found to have tarry black stools in ER, guaic positive.

## 2017-05-04 NOTE — CONSULT NOTE ADULT - SUBJECTIVE AND OBJECTIVE BOX
full note to be dictated    anemia  multifactorial, but HCT 29 on 4/19 and has G pos stools  concern for repeat GIB  had recent Dieulafoy and has epig tenderness  start protonix gtt  serial HCT       LE venous stasis with likely infection  abx    ARF on CRI    serial HCT and will consider endoscopic eval, inc risk and A/B/R D/W them    JYOTI family

## 2017-05-04 NOTE — H&P ADULT - PROBLEM SELECTOR PLAN 2
..  - Anemia of CKD worsened by GI bleed  - Admit to tele, NPO except meds  - GI consult, 2pRBC and 1 platelet as pt is on Aspirin  - Hold aspirin: family made aware about possibility of worsening CAD   - No anticoagulation  - Serial H/H, hold iron pills  - Trend troponins, ECG  - GI consult Dr Hernández

## 2017-05-04 NOTE — ED PROVIDER NOTE - CARE PLAN
Principal Discharge DX:	Renal failure  Secondary Diagnosis:	Gastrointestinal hemorrhage, unspecified gastrointestinal hemorrhage type  Secondary Diagnosis:	Cellulitis, unspecified cellulitis site

## 2017-05-04 NOTE — ED PROVIDER NOTE - SKIN, MLM
Skin normal color for race, warm, dry and intact. No evidence of rash. Bandage to RLE with photo showing tibfib redness with ulcerations. Bandage to RLE , daughter with photo today showing R LE with redness with ulcer.  + stage 2 sacral decub

## 2017-05-04 NOTE — H&P ADULT - PROBLEM SELECTOR PLAN 4
..  - s/p bare metal stent a few months ago  - Hold aspirin  - Cardio consult   - Continue beta blocker, statin

## 2017-05-04 NOTE — ED PROVIDER NOTE - MEDICAL DECISION MAKING DETAILS
Pt currently calm, c/o worsening RLE venous stasis changes with elevated creatinine with plans to receive repeat labs, imaging of RLE for further eval and tx.

## 2017-05-04 NOTE — ED PROVIDER NOTE - DETAILS:
The history, relevant review of systems, past medical and surgical history, medical decision making, and physical examination was documented by the scribe in my presence and I attest to the accuracy of the documentation (Sweetie Knight MD).

## 2017-05-04 NOTE — ED PROVIDER NOTE - NS ED MD SCRIBE ATTENDING SCRIBE SECTIONS
REVIEW OF SYSTEMS/PHYSICAL EXAM/DISPOSITION/PAST MEDICAL/SURGICAL/SOCIAL HISTORY/PROGRESS NOTE/VITAL SIGNS( Pullset)/HISTORY OF PRESENT ILLNESS/RESULTS

## 2017-05-05 DIAGNOSIS — E87.0 HYPEROSMOLALITY AND HYPERNATREMIA: ICD-10-CM

## 2017-05-05 LAB
ANION GAP SERPL CALC-SCNC: 10 MMOL/L — SIGNIFICANT CHANGE UP (ref 5–17)
BASOPHILS # BLD AUTO: 0.1 K/UL — SIGNIFICANT CHANGE UP (ref 0–0.2)
BASOPHILS NFR BLD AUTO: 0.6 % — SIGNIFICANT CHANGE UP (ref 0–2)
BUN SERPL-MCNC: 143 MG/DL — HIGH (ref 7–23)
CALCIUM SERPL-MCNC: 8.3 MG/DL — LOW (ref 8.5–10.1)
CHLORIDE SERPL-SCNC: 118 MMOL/L — HIGH (ref 96–108)
CO2 SERPL-SCNC: 21 MMOL/L — LOW (ref 22–31)
CREAT SERPL-MCNC: 3.68 MG/DL — HIGH (ref 0.5–1.3)
CULTURE RESULTS: NO GROWTH — SIGNIFICANT CHANGE UP
EOSINOPHIL # BLD AUTO: 0.1 K/UL — SIGNIFICANT CHANGE UP (ref 0–0.5)
EOSINOPHIL NFR BLD AUTO: 0.4 % — SIGNIFICANT CHANGE UP (ref 0–6)
GLUCOSE SERPL-MCNC: 109 MG/DL — HIGH (ref 70–99)
HCT VFR BLD CALC: 20.1 % — CRITICAL LOW (ref 39–50)
HCT VFR BLD CALC: 24.8 % — LOW (ref 39–50)
HCT VFR BLD CALC: 25.1 % — LOW (ref 39–50)
HGB BLD-MCNC: 6.8 G/DL — CRITICAL LOW (ref 13–17)
HGB BLD-MCNC: 8.3 G/DL — LOW (ref 13–17)
HGB BLD-MCNC: 8.3 G/DL — LOW (ref 13–17)
LACTATE SERPL-SCNC: 0.6 MMOL/L — LOW (ref 0.7–2)
LACTATE SERPL-SCNC: 0.8 MMOL/L — SIGNIFICANT CHANGE UP (ref 0.7–2)
LYMPHOCYTES # BLD AUTO: 0.3 K/UL — LOW (ref 1–3.3)
LYMPHOCYTES # BLD AUTO: 2.2 % — LOW (ref 13–44)
MAGNESIUM SERPL-MCNC: 2.8 MG/DL — HIGH (ref 1.8–2.4)
MCHC RBC-ENTMCNC: 30.3 PG — SIGNIFICANT CHANGE UP (ref 27–34)
MCHC RBC-ENTMCNC: 31.2 PG — SIGNIFICANT CHANGE UP (ref 27–34)
MCHC RBC-ENTMCNC: 33 GM/DL — SIGNIFICANT CHANGE UP (ref 32–36)
MCHC RBC-ENTMCNC: 34 GM/DL — SIGNIFICANT CHANGE UP (ref 32–36)
MCV RBC AUTO: 91.8 FL — SIGNIFICANT CHANGE UP (ref 80–100)
MCV RBC AUTO: 91.9 FL — SIGNIFICANT CHANGE UP (ref 80–100)
MONOCYTES # BLD AUTO: 1.2 K/UL — HIGH (ref 0–0.9)
MONOCYTES NFR BLD AUTO: 8.6 % — SIGNIFICANT CHANGE UP (ref 2–14)
NEUTROPHILS # BLD AUTO: 12.5 K/UL — HIGH (ref 1.8–7.4)
NEUTROPHILS NFR BLD AUTO: 88.2 % — HIGH (ref 43–77)
PLATELET # BLD AUTO: 375 K/UL — SIGNIFICANT CHANGE UP (ref 150–400)
PLATELET # BLD AUTO: 384 K/UL — SIGNIFICANT CHANGE UP (ref 150–400)
POTASSIUM SERPL-MCNC: 4.9 MMOL/L — SIGNIFICANT CHANGE UP (ref 3.5–5.3)
POTASSIUM SERPL-SCNC: 4.9 MMOL/L — SIGNIFICANT CHANGE UP (ref 3.5–5.3)
RBC # BLD: 2.18 M/UL — LOW (ref 4.2–5.8)
RBC # BLD: 2.73 M/UL — LOW (ref 4.2–5.8)
RBC # FLD: 16.5 % — HIGH (ref 10.3–14.5)
RBC # FLD: 16.9 % — HIGH (ref 10.3–14.5)
SODIUM SERPL-SCNC: 149 MMOL/L — HIGH (ref 135–145)
SPECIMEN SOURCE: SIGNIFICANT CHANGE UP
WBC # BLD: 14.1 K/UL — HIGH (ref 3.8–10.5)
WBC # BLD: 14.9 K/UL — HIGH (ref 3.8–10.5)
WBC # FLD AUTO: 14.1 K/UL — HIGH (ref 3.8–10.5)
WBC # FLD AUTO: 14.9 K/UL — HIGH (ref 3.8–10.5)

## 2017-05-05 PROCEDURE — 74176 CT ABD & PELVIS W/O CONTRAST: CPT | Mod: 26

## 2017-05-05 RX ORDER — SODIUM CHLORIDE 9 MG/ML
1000 INJECTION INTRAMUSCULAR; INTRAVENOUS; SUBCUTANEOUS
Qty: 0 | Refills: 0 | Status: DISCONTINUED | OUTPATIENT
Start: 2017-05-05 | End: 2017-05-06

## 2017-05-05 RX ORDER — ACETAMINOPHEN 500 MG
1000 TABLET ORAL ONCE
Qty: 0 | Refills: 0 | Status: COMPLETED | OUTPATIENT
Start: 2017-05-05 | End: 2017-05-05

## 2017-05-05 RX ORDER — FUROSEMIDE 40 MG
40 TABLET ORAL ONCE
Qty: 0 | Refills: 0 | Status: COMPLETED | OUTPATIENT
Start: 2017-05-05 | End: 2018-04-03

## 2017-05-05 RX ORDER — FUROSEMIDE 40 MG
40 TABLET ORAL ONCE
Qty: 0 | Refills: 0 | Status: COMPLETED | OUTPATIENT
Start: 2017-05-05 | End: 2017-05-05

## 2017-05-05 RX ADMIN — Medication 1 SPRAY(S): at 19:16

## 2017-05-05 RX ADMIN — Medication 40 MILLIGRAM(S): at 11:18

## 2017-05-05 RX ADMIN — GABAPENTIN 300 MILLIGRAM(S): 400 CAPSULE ORAL at 11:19

## 2017-05-05 RX ADMIN — ALBUTEROL 2 PUFF(S): 90 AEROSOL, METERED ORAL at 08:39

## 2017-05-05 RX ADMIN — Medication 650 MILLIGRAM(S): at 12:25

## 2017-05-05 RX ADMIN — GABAPENTIN 600 MILLIGRAM(S): 400 CAPSULE ORAL at 21:25

## 2017-05-05 RX ADMIN — PANTOPRAZOLE SODIUM 10 MG/HR: 20 TABLET, DELAYED RELEASE ORAL at 00:54

## 2017-05-05 RX ADMIN — ONDANSETRON 4 MILLIGRAM(S): 8 TABLET, FILM COATED ORAL at 07:42

## 2017-05-05 RX ADMIN — Medication 0.5 MILLIGRAM(S): at 08:37

## 2017-05-05 RX ADMIN — TRAMADOL HYDROCHLORIDE 100 MILLIGRAM(S): 50 TABLET ORAL at 21:24

## 2017-05-05 RX ADMIN — Medication 150 MILLIGRAM(S): at 06:19

## 2017-05-05 RX ADMIN — Medication 0.5 MILLIGRAM(S): at 20:00

## 2017-05-05 RX ADMIN — PANTOPRAZOLE SODIUM 10 MG/HR: 20 TABLET, DELAYED RELEASE ORAL at 22:25

## 2017-05-05 RX ADMIN — Medication 145 MILLIGRAM(S): at 11:19

## 2017-05-05 RX ADMIN — Medication 120 MILLIGRAM(S): at 11:19

## 2017-05-05 RX ADMIN — Medication 400 MILLIGRAM(S): at 19:37

## 2017-05-05 RX ADMIN — SODIUM CHLORIDE 75 MILLILITER(S): 9 INJECTION INTRAMUSCULAR; INTRAVENOUS; SUBCUTANEOUS at 19:18

## 2017-05-05 RX ADMIN — TRAMADOL HYDROCHLORIDE 100 MILLIGRAM(S): 50 TABLET ORAL at 22:27

## 2017-05-05 RX ADMIN — Medication 1 SPRAY(S): at 06:20

## 2017-05-05 RX ADMIN — SIMVASTATIN 10 MILLIGRAM(S): 20 TABLET, FILM COATED ORAL at 21:25

## 2017-05-05 RX ADMIN — TRAMADOL HYDROCHLORIDE 100 MILLIGRAM(S): 50 TABLET ORAL at 11:19

## 2017-05-05 RX ADMIN — PANTOPRAZOLE SODIUM 10 MG/HR: 20 TABLET, DELAYED RELEASE ORAL at 14:05

## 2017-05-05 RX ADMIN — PRAMIPEXOLE DIHYDROCHLORIDE 0.12 MILLIGRAM(S): 0.12 TABLET ORAL at 22:25

## 2017-05-05 RX ADMIN — PRAMIPEXOLE DIHYDROCHLORIDE 0.12 MILLIGRAM(S): 0.12 TABLET ORAL at 06:19

## 2017-05-05 RX ADMIN — Medication 8 MILLIGRAM(S): at 21:25

## 2017-05-05 RX ADMIN — PRAMIPEXOLE DIHYDROCHLORIDE 0.12 MILLIGRAM(S): 0.12 TABLET ORAL at 15:25

## 2017-05-05 RX ADMIN — Medication 12.5 MILLIGRAM(S): at 06:19

## 2017-05-05 RX ADMIN — CEFEPIME 100 MILLIGRAM(S): 1 INJECTION, POWDER, FOR SOLUTION INTRAMUSCULAR; INTRAVENOUS at 11:18

## 2017-05-05 RX ADMIN — TRAMADOL HYDROCHLORIDE 100 MILLIGRAM(S): 50 TABLET ORAL at 19:38

## 2017-05-05 NOTE — PROGRESS NOTE ADULT - SUBJECTIVE AND OBJECTIVE BOX
81 y/o male presented with anemia 2/2 acute GI bleed, s/p 2 units of PRBC and 1 unit of platelet. No active bleeding   Informed by RN that repeat h/h 6.8/20.1 ( was 6.3/21.8 in ED)  Will transfused 1 unit of PRBC  GI consult appreciated, pt for possible EGD   Serial CBC

## 2017-05-05 NOTE — CONSULT NOTE ADULT - SUBJECTIVE AND OBJECTIVE BOX
NEPHROLOGY INTERVAL HPI/OVERNIGHT EVENTS:  83 y/o WM well known to me with CKD stage 4 at baseline with scr of 2.0-2.5 at baseline, transferred from SNF with worseing LE pain, and Altered MS.  Pt upon admission noted with Scr of 3.99 and weeping LE with hx of severe PVD on UNNA boots. Hgb was in 6. Currently in the process of receiving 4/4 PRBC with lasix in between.  UOP + with [place] catheter placed.    Hx of recent Dieulafoy bleed and planned for repeat EGD this afternoon.   Remains with altered MS    As per daughters, had been having variable renal function at facility and was given lasix with IVF alternating.      PAST MEDICAL & SURGICAL HISTORY:  -CKD stage 4 with scr of 2-2.5  -BPH (benign prostatic hypertrophy)  -Afib not on AC due to GI bleed  -Hyperlipemia  -Coronary artery disease  -Hypertension  -Dyspepsia  -COPD/SHAYY home 02 therapy, and treatment with BIPAP  -Pleural effusion, bilateral  -Peripheral edema  -Gout  -Spinal stenosis  -Hypercholesterolemia  -GERD (gastroesophageal reflux disease)  -CAD (coronary artery disease)  -S/P angioplasty with stent  -Cataract of left eye  -Prostate: Surgery green light procedure.  -S/P rotator cuff surgery: Right    FAMILY HISTORY:  No pertinent family history in first degree relatives      MEDICATIONS  (STANDING):  pantoprazole Infusion 8mG/Hr IV Continuous <Continuous>  buDESOnide   0.5 milliGRAM(s) Respule 0.5milliGRAM(s) Inhalation two times a day  doxazosin 8milliGRAM(s) Oral at bedtime  isosorbide   mononitrate ER Tablet (IMDUR) 120milliGRAM(s) Oral daily  diltiazem   CD 120milliGRAM(s) Oral daily  gabapentin 600milliGRAM(s) Oral at bedtime  gabapentin 300milliGRAM(s) Oral daily  allopurinol 100milliGRAM(s) Oral daily  fenofibrate Tablet 145milliGRAM(s) Oral daily  simvastatin 10milliGRAM(s) Oral at bedtime  pramipexole 0.125milliGRAM(s) Oral three times a day  ALBUTerol    90 MICROgram(s) HFA Inhaler 2Puff(s) Inhalation every 6 hours  ammonium lactate 12% Lotion 1Application(s) Topical two times a day  hydrALAZINE 150milliGRAM(s) Oral two times a day  fluticasone propionate 50 MICROgram(s)/spray Nasal Spray 1Spray(s) Alternating Nostrils two times a day  metoprolol 12.5milliGRAM(s) Oral two times a day  cefepime  IVPB 1000milliGRAM(s) IV Intermittent daily  furosemide   Injectable 40milliGRAM(s) IV Push once    MEDICATIONS  (PRN):  acetaminophen   Tablet 650milliGRAM(s) Oral every 6 hours PRN For Temp greater than 38 C (100.4 F)  traMADol 100milliGRAM(s) Oral every 6 hours PRN Moderate Pain (4 - 6)  aluminum hydroxide/magnesium hydroxide/simethicone Suspension 30milliLiter(s) Oral every 6 hours PRN Dyspepsia  ondansetron Injectable 4milliGRAM(s) IV Push every 6 hours PRN Nausea      Allergies    No Known Allergies    Intolerances        I&O's Summary  I & Os for 24h ending 05 May 2017 07:00  =============================================  IN: 647 ml / OUT: 400 ml / NET: 247 ml    I & Os for current day (as of 05 May 2017 10:58)  =============================================  IN: 332 ml / OUT: 0 ml / NET: 332 ml        Vital Signs Last 24 Hrs  T(C): 37.8, Max: 38.2 ( @ 23:00)  T(F): 100.1, Max: 100.8 ( @ 23:00)  HR: 99 (89 - 115)  BP: 121/33 (106/37 - 156/41)  BP(mean): 56 (47 - 72)  RR: 16 (15 - 25)  SpO2: 95% (89% - 100%)  Daily     Daily Weight in k.3 (05 May 2017 06:31)  I&O's Summary  I & Os for 24h ending 05 May 2017 07:00  =============================================  IN: 647 ml / OUT: 400 ml / NET: 247 ml    I & Os for current day (as of 05 May 2017 10:58)  =============================================  IN: 332 ml / OUT: 0 ml / NET: 332 ml      PHYSICAL EXAM:  GEN: alert awake O to person only  HEENT: MMM  NECK supple no jvd  CV: RRR s1s2  LUNGS: b/l CTA  ABD: + soft,   EXT: LE dressing in place    LABS:                        6.8    14.1  )-----------( 375      ( 05 May 2017 05:10 )             20.1     05-    149<H>  |  118<H>  |  143<H>  ----------------------------<  109<H>  4.9   |  21<L>  |  3.68<H>    Ca    8.3<L>      05 May 2017 05:10  Mg     2.8     -    TPro  6.6  /  Alb  2.4<L>  /  TBili  0.2  /  DBili  x   /  AST  20  /  ALT  15  /  AlkPhos  72  05-04    PT/INR - ( 04 May 2017 12:54 )   PT: 13.6 sec;   INR: 1.25 ratio         PTT - ( 04 May 2017 12:54 )  PTT:38.8 sec  Urinalysis Basic - ( 04 May 2017 18:00 )    Color: Yellow / Appearance: Clear / S.020 / pH: x  Gluc: x / Ketone: Negative  / Bili: Small / Urobili: Negative mg/dL   Blood: x / Protein: 15 mg/dL / Nitrite: Negative   Leuk Esterase: Trace / RBC: 0-2 /HPF / WBC 0-2   Sq Epi: x / Non Sq Epi: Occasional / Bacteria: Occasional      Magnesium, Serum: 2.8 mg/dL ( @ 05:10)

## 2017-05-05 NOTE — CONSULT NOTE ADULT - SUBJECTIVE AND OBJECTIVE BOX
Patient is a 82y old  Male who presents with a chief complaint of Worsening leg pain, anemia, ARYA sent from Rehab.      HPI:  82 year old male with history of CAD s/p stents (LAD, RCA bare metal around ), A.Fib not on anticoagulation due to GI bleeding, Hypertension, COPD on home O2 2L, SHAYY on nocturnal BIPAP, ex-smoker (smoked 1ppd X 50 years, quit 22 years ago),  Chronic diastolic CHF, Hyperlipidemia, PVD, Iron deficiency anemia, Chronic back pain, Gout, BPH, CKD III with baseline Cr 2, recently admitted to  in  with anemia of GI bleeding, RLE and RUE DVTs, received pRBCs and IVC filter discharged to rehab with aspirin was sent to  ER on 17 for worsening anemia with Hb 6, worsening leg pain from ulcers and worsening renal function Cr 3.99.    Pt this am appears confused and wants to get out of bed.  He denies any CP or SOB.       PAST MEDICAL & SURGICAL HISTORY:  Sepsis, due to unspecified organism: 2/2 poorly healing wounds b/l  BPH (benign prostatic hypertrophy)  Hyperlipemia  Coronary artery disease  Hypertension  Dyspepsia: On moderate exertion.  Sleep apnea, obstructive: Requires home 02 therapy, and treatment with BIPAP  Atelectasis  Pleural effusion, bilateral  Respiratory failure  Peripheral edema  CRI (chronic renal insufficiency)  Gout  CRF (chronic renal failure)  Benign prostatic hypertrophy  Spinal stenosis  Hypercholesterolemia  GERD (gastroesophageal reflux disease)  CAD (coronary artery disease)  Hypertension  S/P angioplasty with stent  Cataract of left eye  Prostate: Surgery green light procedure.  S/P rotator cuff surgery: Right  S/P angioplasty  Rotator cuff tear, right: repair      MEDICATIONS  (STANDING):  pantoprazole Infusion 8mG/Hr IV Continuous <Continuous>  buDESOnide   0.5 milliGRAM(s) Respule 0.5milliGRAM(s) Inhalation two times a day  doxazosin 8milliGRAM(s) Oral at bedtime  isosorbide   mononitrate ER Tablet (IMDUR) 120milliGRAM(s) Oral daily  diltiazem   CD 120milliGRAM(s) Oral daily  gabapentin 600milliGRAM(s) Oral at bedtime  gabapentin 300milliGRAM(s) Oral daily  allopurinol 100milliGRAM(s) Oral daily  fenofibrate Tablet 145milliGRAM(s) Oral daily  simvastatin 10milliGRAM(s) Oral at bedtime  pramipexole 0.125milliGRAM(s) Oral three times a day  ALBUTerol    90 MICROgram(s) HFA Inhaler 2Puff(s) Inhalation every 6 hours  ammonium lactate 12% Lotion 1Application(s) Topical two times a day  hydrALAZINE 150milliGRAM(s) Oral two times a day  fluticasone propionate 50 MICROgram(s)/spray Nasal Spray 1Spray(s) Alternating Nostrils two times a day  metoprolol 12.5milliGRAM(s) Oral two times a day  cefepime  IVPB 1000milliGRAM(s) IV Intermittent daily    MEDICATIONS  (PRN):  acetaminophen   Tablet 650milliGRAM(s) Oral every 6 hours PRN For Temp greater than 38 C (100.4 F)  traMADol 100milliGRAM(s) Oral every 6 hours PRN Moderate Pain (4 - 6)  aluminum hydroxide/magnesium hydroxide/simethicone Suspension 30milliLiter(s) Oral every 6 hours PRN Dyspepsia  ondansetron Injectable 4milliGRAM(s) IV Push every 6 hours PRN Nausea      FAMILY HISTORY:  No pertinent family history in first degree relatives      SOCIAL HISTORY:  non smoker, no alcohol use      REVIEW OF SYSTEMS:  Unable to obtain as pt is confused        Vital Signs Last 24 Hrs  T(C): 37.9, Max: 38.2 (05-04 @ 23:00)  T(F): 100.3, Max: 100.8 (05-04 @ 23:00)  HR: 97 (89 - 115)  BP: 122/36 (106/37 - 156/41)  BP(mean): 59 (47 - 72)  RR: 19 (15 - 25)  SpO2: 94% (89% - 100%)    PHYSICAL EXAM-    Constitutional: ill looking pt with confusion.  Head: Head is normocephalic and atraumatic.      Neck: The patient's neck is supple without enlargement, has no palpable thyromegaly nor thyroid nodules and has no jugular venous distention. No audible carotid bruits. There are strong carotid pulses bilaterally. No JVD.     Cardiovascular: Regular rate and rhythm without S3, S4. No murmurs or rubs are appreciated.      Respiratory: Breath sounds are normal. No rales. No wheezing.    Abdomen: Soft, nontender, nondistended with positive bowel sounds.      Extremity: both extremities are wrapped currently foul smelling and L foot is pale.    Neurologic: confused     Skin: No rash, no obvious lesions noted.      Psychiatric: confused      INTERPRETATION OF TELEMETRY: sinus tachycardia    ECG:sinus tachycardia with normal axis, ST depression in V5-7.    I&O's Detail    I & Os for current day (as of 05 May 2017 08:04)  =============================================  IN:    Packed Red Blood Cells: 647 ml    Total IN: 647 ml  ---------------------------------------------  OUT:    Voided: 400 ml    Total OUT: 400 ml  ---------------------------------------------  Total NET: 247 ml      LABS:                        6.8    14.1  )-----------( 375      ( 05 May 2017 05:10 )             20.1     05-05    149<H>  |  118<H>  |  143<H>  ----------------------------<  109<H>  4.9   |  21<L>  |  3.68<H>    Ca    8.3<L>      05 May 2017 05:10  Mg     2.8     05-    TPro  6.6  /  Alb  2.4<L>  /  TBili  0.2  /  DBili  x   /  AST  20  /  ALT  15  /  AlkPhos  72  05-    CARDIAC MARKERS ( 04 May 2017 12:54 )  <0.015 ng/mL / x     / 99 U/L / x     / x          PT/INR - ( 04 May 2017 12:54 )   PT: 13.6 sec;   INR: 1.25 ratio         PTT - ( 04 May 2017 12:54 )  PTT:38.8 sec  Urinalysis Basic - ( 04 May 2017 18:00 )    Color: Yellow / Appearance: Clear / S.020 / pH: x  Gluc: x / Ketone: Negative  / Bili: Small / Urobili: Negative mg/dL   Blood: x / Protein: 15 mg/dL / Nitrite: Negative   Leuk Esterase: Trace / RBC: 0-2 /HPF / WBC 0-2   Sq Epi: x / Non Sq Epi: Occasional / Bacteria: Occasional      I&O's Summary    I & Os for current day (as of 05 May 2017 08:04)  =============================================  IN: 647 ml / OUT: 400 ml / NET: 247 ml    BNP  RADIOLOGY & ADDITIONAL STUDIES:

## 2017-05-05 NOTE — PROGRESS NOTE ADULT - PROBLEM SELECTOR PLAN 3
- Cr 3.68 from 3.99  - Nephro consult appreciated   - IV lasix before transfusions per Dr Sanabria - Cr 3.68 from 3.99  - Nephro consult appreciated   - IV lasix before transfusions as per Dr Sanabria

## 2017-05-05 NOTE — PROGRESS NOTE ADULT - ASSESSMENT
nemia, likely multifactorial  I suspect that there is a component of GI bleeding.  He has received 4 units of packed red blood cells.  He has responded well to the 2nd 2 units although he's hhemoglobinbut did not entirely rise by 2 points    I had an extensive discussion with the ppatient's daughter. Secondary to his multiple comorbid disorders, at this time, we will delay endoscopic evaluation. It is likely that he would require intubation and may have respiratory compromise.  He likely also septic.  We'll continue Protonix drip    Serial H&H's every 6 hours.    Care was discussed with cardiology as well as anesthesiology and hospitalist.    Poor prognosis discussed with patient's family. DO NOT RESUSCITATE status reviewed with them.      Lower extremity DVT, status post IVC filter    Sepsis likely secondary to lower extremity cellulitis and antibiotics will be continued.  Infectious disease note was appreciated.    Advanced COPD and obstructive sleep apnea reviewed.

## 2017-05-05 NOTE — PROGRESS NOTE ADULT - SUBJECTIVE AND OBJECTIVE BOX
Patient is a 82y old  Male who presents with a chief complaint of weakness  HPI:  82 y/o male with h/of COPD, CHF, chronic renal failure, paroxysmal A-fib.with, hematochezia s/p bleeding scan and flex sigmoidoscopy, anemia, chronic LE stasis dermatitis  was was admitted for increased weakness and wheepy right LE and foot. The patient had a RLE ABDULAZIZ boot for chronic venous stasis ulcers and lower extremity chronic edema that was removed recently.. The daughter reports increased oozing from LE ulcers; she reports thet the right foot ulcers were debrided the day PTA. No fever or chills reported. In Ed patient noted to have Hb 6. He received vancomycin 1 gm IV x 1 and cefepime 1 gm IV x 1.    Lying in bed in NAD  Weak looking  Right foot pain    MEDICATIONS  (STANDING):  pantoprazole Infusion 8mG/Hr IV Continuous <Continuous>  buDESOnide   0.5 milliGRAM(s) Respule 0.5milliGRAM(s) Inhalation two times a day  doxazosin 8milliGRAM(s) Oral at bedtime  isosorbide   mononitrate ER Tablet (IMDUR) 120milliGRAM(s) Oral daily  diltiazem   CD 120milliGRAM(s) Oral daily  gabapentin 600milliGRAM(s) Oral at bedtime  gabapentin 300milliGRAM(s) Oral daily  allopurinol 100milliGRAM(s) Oral daily  fenofibrate Tablet 145milliGRAM(s) Oral daily  simvastatin 10milliGRAM(s) Oral at bedtime  pramipexole 0.125milliGRAM(s) Oral three times a day  ALBUTerol    90 MICROgram(s) HFA Inhaler 2Puff(s) Inhalation every 6 hours  ammonium lactate 12% Lotion 1Application(s) Topical two times a day  hydrALAZINE 150milliGRAM(s) Oral two times a day  fluticasone propionate 50 MICROgram(s)/spray Nasal Spray 1Spray(s) Alternating Nostrils two times a day  metoprolol 12.5milliGRAM(s) Oral two times a day  cefepime  IVPB 1000milliGRAM(s) IV Intermittent daily  furosemide   Injectable 40milliGRAM(s) IV Push once    MEDICATIONS  (PRN):  acetaminophen   Tablet 650milliGRAM(s) Oral every 6 hours PRN For Temp greater than 38 C (100.4 F)  traMADol 100milliGRAM(s) Oral every 6 hours PRN Moderate Pain (4 - 6)  aluminum hydroxide/magnesium hydroxide/simethicone Suspension 30milliLiter(s) Oral every 6 hours PRN Dyspepsia  ondansetron Injectable 4milliGRAM(s) IV Push every 6 hours PRN Nausea      Vital Signs Last 24 Hrs  T(C): 37.8, Max: 38.2 (05-04 @ 23:00)  T(F): 100.1, Max: 100.8 (05-04 @ 23:00)  HR: 99 (89 - 115)  BP: 121/33 (106/37 - 156/41)  BP(mean): 56 (47 - 72)  RR: 16 (15 - 25)  SpO2: 95% (89% - 100%)    Physical Exam:    Constitutional: frail looking  HEENT: NC/AT, EOMI, PERRLA  Neck: supple  Back: no tenderness  Respiratory: few basal rales  Cardiovascular: S1S2 regular, no murmurs  Abdomen: soft, not tender, not distended, positive BS  Genitourinary: deferred  Rectal: deferred  Musculoskeletal: no muscle tenderness, no joint swelling or tenderness  Extremities: b/l chronic skin changes with edema; RLE: dusky and colder foot; dorsal aspect of foot and lower ankle superficial ulcers with serous discharge; surrounding erythema and edema  Neurological: confused, moving all extremities, no focal deficits  Skin: no rashes    Labs:                        6.8    14.1  )-----------( 375      ( 05 May 2017 05:10 )             20.1     05-05    149<H>  |  118<H>  |  143<H>  ----------------------------<  109<H>  4.9   |  21<L>  |  3.68<H>    Ca    8.3<L>      05 May 2017 05:10  Mg     2.8     05-05    TPro  6.6  /  Alb  2.4<L>  /  TBili  0.2  /  DBili  x   /  AST  20  /  ALT  15  /  AlkPhos  72  05-04               6.3    10.8  )-----------( 398      ( 04 May 2017 12:54 )             21.8     05-04    145  |  112<H>  |  138<H>  ----------------------------<  123<H>  5.5<H>   |  19<L>  |  3.99<H>    Ca    8.8      04 May 2017 12:54    TPro  6.6  /  Alb  2.4<L>  /  TBili  0.2  /  DBili  x   /  AST  20  /  ALT  15  /  AlkPhos  72  05-04     LIVER FUNCTIONS - ( 04 May 2017 12:54 )  Alb: 2.4 g/dL / Pro: 6.6 gm/dL / ALK PHOS: 72 U/L / ALT: 15 U/L / AST: 20 U/L / GGT: x             Radiology:    Advanced directives addressed: full resuscitation

## 2017-05-05 NOTE — PROGRESS NOTE ADULT - SUBJECTIVE AND OBJECTIVE BOX
Pt seen earlier today; he is well known to me for office and hospital visits and was transferred for evaluation of anemia, presumably from GI source    has venous stasis and mixed ulcers but developed painful right leg and foot    MEDICATIONS  (STANDING):  pantoprazole Infusion 8mG/Hr IV Continuous <Continuous>  buDESOnide   0.5 milliGRAM(s) Respule 0.5milliGRAM(s) Inhalation two times a day  doxazosin 8milliGRAM(s) Oral at bedtime  isosorbide   mononitrate ER Tablet (IMDUR) 120milliGRAM(s) Oral daily  diltiazem   CD 120milliGRAM(s) Oral daily  gabapentin 600milliGRAM(s) Oral at bedtime  gabapentin 300milliGRAM(s) Oral daily  allopurinol 100milliGRAM(s) Oral daily  fenofibrate Tablet 145milliGRAM(s) Oral daily  simvastatin 10milliGRAM(s) Oral at bedtime  pramipexole 0.125milliGRAM(s) Oral three times a day  ALBUTerol    90 MICROgram(s) HFA Inhaler 2Puff(s) Inhalation every 6 hours  ammonium lactate 12% Lotion 1Application(s) Topical two times a day  hydrALAZINE 150milliGRAM(s) Oral two times a day  fluticasone propionate 50 MICROgram(s)/spray Nasal Spray 1Spray(s) Alternating Nostrils two times a day  metoprolol 12.5milliGRAM(s) Oral two times a day  cefepime  IVPB 1000milliGRAM(s) IV Intermittent daily    MEDICATIONS  (PRN):  acetaminophen   Tablet 650milliGRAM(s) Oral every 6 hours PRN For Temp greater than 38 C (100.4 F)  traMADol 100milliGRAM(s) Oral every 6 hours PRN Moderate Pain (4 - 6)  aluminum hydroxide/magnesium hydroxide/simethicone Suspension 30milliLiter(s) Oral every 6 hours PRN Dyspepsia  ondansetron Injectable 4milliGRAM(s) IV Push every 6 hours PRN Nausea      Allergies    No Known Allergies    Intolerances        Flatus: [ ] YES [ ] NO             Bowel Movement: [ ] YES [ ] NO  Pain (0-10):            Pain Control Adequate: [ ] YES [ ] NO  Nausea: [ ] YES [ ] NO            Vomiting: [ ] YES [ ] NO  Diarrhea: [ ] YES [ ] NO         Constipation: [ ] YES [ ] NO     Chest Pain: [ ] YES [ ] NO    SOB:  [ ] YES [ ] NO    Vital Signs Last 24 Hrs  T(C): 38, Max: 38.3 ( @ 12:58)  T(F): 100.4, Max: 100.9 ( @ 12:58)  HR: 91 (89 - 115)  BP: 128/31 (106/37 - 156/41)  BP(mean): 54 (47 - 78)  RR: 18 (15 - 25)  SpO2: 93% (88% - 100%)    I&O's Summary  I & Os for 24h ending 05 May 2017 07:00  =============================================  IN: 647 ml / OUT: 400 ml / NET: 247 ml    I & Os for current day (as of 05 May 2017 17:47)  =============================================  IN: 805 ml / OUT: 0 ml / NET: 805 ml      Physical Exam:    lethargic but arousable  General: NAD  Pulmonary: normal resp effort, CTA-B  Cardiovascular: NSR  Abdominal: soft, NT/ND  Extremities: WWP, normal strength  Neuro: A/O x 3, CNs II-XII grossly intact, normal motor/sensation, no focal deficits  :   Right:                                                                          Left:      L foot warm    R foot cooler than L but cap refill remains ~ 2 s; necrotic ulcers on dorsal surface of R foot with cellulitis    LABS:                        8.3    14.9  )-----------( 384      ( 05 May 2017 15:19 )             25.1     05-05    149<H>  |  118<H>  |  143<H>  ----------------------------<  109<H>  4.9   |  21<L>  |  3.68<H>    Ca    8.3<L>      05 May 2017 05:10  Mg     2.8     05-    TPro  6.6  /  Alb  2.4<L>  /  TBili  0.2  /  DBili  x   /  AST  20  /  ALT  15  /  AlkPhos  72  -    PT/INR - ( 04 May 2017 12:54 )   PT: 13.6 sec;   INR: 1.25 ratio         PTT - ( 04 May 2017 12:54 )  PTT:38.8 sec  Urinalysis Basic - ( 04 May 2017 18:00 )    Color: Yellow / Appearance: Clear / S.020 / pH: x  Gluc: x / Ketone: Negative  / Bili: Small / Urobili: Negative mg/dL   Blood: x / Protein: 15 mg/dL / Nitrite: Negative   Leuk Esterase: Trace / RBC: 0-2 /HPF / WBC 0-2   Sq Epi: x / Non Sq Epi: Occasional / Bacteria: Occasional      LIVER FUNCTIONS - ( 04 May 2017 12:54 )  Alb: 2.4 g/dL / Pro: 6.6 gm/dL / ALK PHOS: 72 U/L / ALT: 15 U/L / AST: 20 U/L / GGT: x           CAPILLARY BLOOD GLUCOSE      RADIOLOGY & ADDITIONAL TESTS:

## 2017-05-05 NOTE — PROGRESS NOTE ADULT - SUBJECTIVE AND OBJECTIVE BOX
events noted  HCT did respond to PRBC  please transfuse and keep HGB>8 if possible    cardiac clearance  plan push enteroscopy today    CT to assess abd pain and RO retroperiton hemorrhage    neg BM, unusual for active GIB

## 2017-05-05 NOTE — PROGRESS NOTE ADULT - PROBLEM SELECTOR PLAN 1
- Lactate .08 now but WBC elevated, chronic lower extremity ulcers, ARYA   - ID consult appreciated in ER  - Antibiotics started (namely vancomcyin and cefepime), follow-up culture  - Vascular consult appreciated.  Severe PVD Ischemic Rt goot and ulcers.  Currently not an OR candidate - Lactate .08 now but WBC elevated, chronic lower extremity ulcers, ARYA   - ID consult appreciated  - Antibiotics started (namely vancomcyin and cefepime), follow-up culture data  - Vascular consult appreciated.  Severe PVD Ischemic Rt goot and ulcers.  Currently not an OR candidate  - Gentle IV fluds NS 75ml/hour  - Multisystem organ failure (cardiac, renal, GI bleed, PVD, ischemic Rt foot and ulcers) with sepsis.  Consult intensivist

## 2017-05-05 NOTE — PROGRESS NOTE ADULT - SUBJECTIVE AND OBJECTIVE BOX
HPI:  82 year old male with history of CAD s/p stents (LAD, RCA bare metal around ), A.Fib not on anticoagulation due to GI bleeding, Hypertension, COPD on home O2 2L, SHAYY on nocturnal BIPAP, ex-smoker (smoked 1ppd X 50 years, quit 22 years ago),  Chronic diastolic CHF, Hyperlipidemia, PVD, Iron deficiency anemia, Chronic back pain, Gout, BPH, CKD III with baseline Cr 2, recently admitted to  in  with anemia of GI bleeding, RLE and RUE DVTs, received pRBCs and IVC filter discharged to rehab with aspirin was sent to  ER on 17 for worsening anemia with Hb 6, worsening leg pain from ulcers and worsening renal function Cr 3.99.    5/5 Pt was seen and examined at bedside.  Chart reviewed.  Lethargic.  Rt foot noted to have purplish discoloration.       PHYSICAL EXAM:  T(C): 38, Max: 38.3 (05-05 @ 12:58)  HR: 91 (89 - 115)  BP: 128/31 (106/37 - 156/41)  RR: 18 (15 - 25)  SpO2: 93% (88% - 100%)    GENERAL: Ill appearing  HEAD:  Atraumatic, Normocephalic  NECK: Supple, No JVD  CHEST/LUNG: Decreased  HEART: Regular rate and rhythm; No murmurs, rubs, or gallops  ABDOMEN: Soft, Nontender, Nondistended; Bowel sounds present  EXTREMITIES:  Rt foot cold and noted to have purplish discoloration.           MEDICATIONS  (STANDING):  pantoprazole Infusion 8mG/Hr IV Continuous <Continuous>  buDESOnide   0.5 milliGRAM(s) Respule 0.5milliGRAM(s) Inhalation two times a day  doxazosin 8milliGRAM(s) Oral at bedtime  isosorbide   mononitrate ER Tablet (IMDUR) 120milliGRAM(s) Oral daily  diltiazem   CD 120milliGRAM(s) Oral daily  gabapentin 600milliGRAM(s) Oral at bedtime  gabapentin 300milliGRAM(s) Oral daily  allopurinol 100milliGRAM(s) Oral daily  fenofibrate Tablet 145milliGRAM(s) Oral daily  simvastatin 10milliGRAM(s) Oral at bedtime  pramipexole 0.125milliGRAM(s) Oral three times a day  ALBUTerol    90 MICROgram(s) HFA Inhaler 2Puff(s) Inhalation every 6 hours  ammonium lactate 12% Lotion 1Application(s) Topical two times a day  hydrALAZINE 150milliGRAM(s) Oral two times a day  fluticasone propionate 50 MICROgram(s)/spray Nasal Spray 1Spray(s) Alternating Nostrils two times a day  metoprolol 12.5milliGRAM(s) Oral two times a day  cefepime  IVPB 1000milliGRAM(s) IV Intermittent daily  sodium chloride 0.9%. 1000milliLiter(s) IV Continuous <Continuous>    MEDICATIONS  (PRN):  acetaminophen   Tablet 650milliGRAM(s) Oral every 6 hours PRN For Temp greater than 38 C (100.4 F)  traMADol 100milliGRAM(s) Oral every 6 hours PRN Moderate Pain (4 - 6)  aluminum hydroxide/magnesium hydroxide/simethicone Suspension 30milliLiter(s) Oral every 6 hours PRN Dyspepsia  ondansetron Injectable 4milliGRAM(s) IV Push every 6 hours PRN Nausea      LABS:                        8.3    14.9  )-----------( 384      ( 05 May 2017 15:19 )             25.1     05-05    149<H>  |  118<H>  |  143<H>  ----------------------------<  109<H>  4.9   |  21<L>  |  3.68<H>    Ca    8.3<L>      05 May 2017 05:10  Mg     2.8     05-05    TPro  6.6  /  Alb  2.4<L>  /  TBili  0.2  /  DBili  x   /  AST  20  /  ALT  15  /  AlkPhos  72  05-04    PT/INR - ( 04 May 2017 12:54 )   PT: 13.6 sec;   INR: 1.25 ratio         PTT - ( 04 May 2017 12:54 )  PTT:38.8 sec  Urinalysis Basic - ( 04 May 2017 18:00 )    Color: Yellow / Appearance: Clear / S.020 / pH: x  Gluc: x / Ketone: Negative  / Bili: Small / Urobili: Negative mg/dL   Blood: x / Protein: 15 mg/dL / Nitrite: Negative   Leuk Esterase: Trace / RBC: 0-2 /HPF / WBC 0-2   Sq Epi: x / Non Sq Epi: Occasional / Bacteria: Occasional      Magnesium, Serum: 2.8 mg/dL ( @ 05:10)    CARDIAC MARKERS ( 04 May 2017 12:54 )  <0.015 ng/mL / x     / 99 U/L / x     / x              DVT/GI ppx  Discussed with pt @ bedside HPI:  82 year old male with history of CAD s/p stents (LAD, RCA bare metal around ), A.Fib not on anticoagulation due to GI bleeding, Hypertension, COPD on home O2 2L, SHAYY on nocturnal BIPAP, ex-smoker (smoked 1ppd X 50 years, quit 22 years ago),  Chronic diastolic CHF, Hyperlipidemia, PVD, Iron deficiency anemia, Chronic back pain, Gout, BPH, CKD III with baseline Cr 2, recently admitted to  in  with anemia of GI bleeding, RLE and RUE DVTs, received pRBCs and IVC filter discharged to rehab with aspirin was sent to  ER on 17 for worsening anemia with Hb 6, worsening leg pain from ulcers and worsening renal function Cr 3.99.    5/5 Pt was seen and examined at bedside.  Chart reviewed.  Lethargic.  Purplish discoloration noted Rt foot    PHYSICAL EXAM:  T(C): 38, Max: 38.3 (05-05 @ 12:58)  HR: 91 (89 - 115)  BP: 128/31 (106/37 - 156/41)  RR: 18 (15 - 25)  SpO2: 93% (88% - 100%)    GENERAL: Ill appearing  HEAD:  Atraumatic, Normocephalic  NECK: Supple, No JVD  CHEST/LUNG: Decreased  HEART: Regular rate and rhythm; No murmurs, rubs, or gallops  ABDOMEN: Soft, Nontender, Nondistended; Bowel sounds present  EXTREMITIES:  Rt foot cold and noted to have purplish discoloration.           MEDICATIONS  (STANDING):  pantoprazole Infusion 8mG/Hr IV Continuous <Continuous>  buDESOnide   0.5 milliGRAM(s) Respule 0.5milliGRAM(s) Inhalation two times a day  doxazosin 8milliGRAM(s) Oral at bedtime  isosorbide   mononitrate ER Tablet (IMDUR) 120milliGRAM(s) Oral daily  diltiazem   CD 120milliGRAM(s) Oral daily  gabapentin 600milliGRAM(s) Oral at bedtime  gabapentin 300milliGRAM(s) Oral daily  allopurinol 100milliGRAM(s) Oral daily  fenofibrate Tablet 145milliGRAM(s) Oral daily  simvastatin 10milliGRAM(s) Oral at bedtime  pramipexole 0.125milliGRAM(s) Oral three times a day  ALBUTerol    90 MICROgram(s) HFA Inhaler 2Puff(s) Inhalation every 6 hours  ammonium lactate 12% Lotion 1Application(s) Topical two times a day  hydrALAZINE 150milliGRAM(s) Oral two times a day  fluticasone propionate 50 MICROgram(s)/spray Nasal Spray 1Spray(s) Alternating Nostrils two times a day  metoprolol 12.5milliGRAM(s) Oral two times a day  cefepime  IVPB 1000milliGRAM(s) IV Intermittent daily  sodium chloride 0.9%. 1000milliLiter(s) IV Continuous <Continuous>    MEDICATIONS  (PRN):  acetaminophen   Tablet 650milliGRAM(s) Oral every 6 hours PRN For Temp greater than 38 C (100.4 F)  traMADol 100milliGRAM(s) Oral every 6 hours PRN Moderate Pain (4 - 6)  aluminum hydroxide/magnesium hydroxide/simethicone Suspension 30milliLiter(s) Oral every 6 hours PRN Dyspepsia  ondansetron Injectable 4milliGRAM(s) IV Push every 6 hours PRN Nausea      LABS:                        8.3    14.9  )-----------( 384      ( 05 May 2017 15:19 )             25.1     05-05    149<H>  |  118<H>  |  143<H>  ----------------------------<  109<H>  4.9   |  21<L>  |  3.68<H>    Ca    8.3<L>      05 May 2017 05:10  Mg     2.8     05-05    TPro  6.6  /  Alb  2.4<L>  /  TBili  0.2  /  DBili  x   /  AST  20  /  ALT  15  /  AlkPhos  72  05-04    PT/INR - ( 04 May 2017 12:54 )   PT: 13.6 sec;   INR: 1.25 ratio         PTT - ( 04 May 2017 12:54 )  PTT:38.8 sec  Urinalysis Basic - ( 04 May 2017 18:00 )    Color: Yellow / Appearance: Clear / S.020 / pH: x  Gluc: x / Ketone: Negative  / Bili: Small / Urobili: Negative mg/dL   Blood: x / Protein: 15 mg/dL / Nitrite: Negative   Leuk Esterase: Trace / RBC: 0-2 /HPF / WBC 0-2   Sq Epi: x / Non Sq Epi: Occasional / Bacteria: Occasional      Magnesium, Serum: 2.8 mg/dL ( @ 05:10)    CARDIAC MARKERS ( 04 May 2017 12:54 )  <0.015 ng/mL / x     / 99 U/L / x     / x              DVT/GI ppx  Discussed with pt @ bedside

## 2017-05-05 NOTE — PROGRESS NOTE ADULT - ASSESSMENT
multi organ system failure (Cardiac, renal, GI bleed, PVD with ischemia R foot and ulcers, sepsis)    would recommend stabilization, if possible and re evaluate in am

## 2017-05-05 NOTE — ADVANCED PRACTICE NURSE CONSULT - ASSESSMENT
Rec'd order to place midline for access and IV abx. Pt. lethargic but arousable and oriented to person and place. Reviewed chart and labwork and inserted Bard Powerglide Pro midline via ultrasound guidance to left cephalic vein 18g. 10 cm. length with brisk blood return, flushes well w/20 ml. NS. Endcap placed, pt. tolerated well. No CXR needed for midline. Report given to District Nurse. Lot #[id number]

## 2017-05-05 NOTE — PROGRESS NOTE ADULT - ASSESSMENT
82 y/o male with h/of COPD, CHF, chronic renal failure, paroxysmal A-fib.with, hematochezia s/p bleeding scan and flex sigmoidoscopy, anemia, chronic LE stasis dermatitis  was was admitted for increased weakness and wheepy right LE and foot. The patient had a RLE ABDULAZIZ boot for chronic venous stasis ulcers and lower extremity chronic edema that was removed recently.. The daughter reports increased oozing from LE ulcers; she reports thet the right foot ulcers were debrided the day PTA. No fever or chills reported. In Ed patient noted to have Hb 6. He received vancomycin 1 gm IV x 1 and cefepime 1 gm IV x 1.    1. Chronic right LE stasis ulcers. Chronic dermatitis with likely superimposed cellulitis. Severe PVD. ARF. Severe anemia.  -has rectal bleeding  -f/u BC x 2  -on cefepime 1 gm IV qd # 2  -tolerating abx well so far; no side effects noted   -continue abx coverage  -monitor BMP  -local wound care  -monitor temps  -f/u CBC  -vascular evaluation appreciated  -supportive care  2. Other issues:  -care per medicine

## 2017-05-05 NOTE — PROGRESS NOTE ADULT - PROBLEM SELECTOR PLAN 4
- s/p bare metal stent a few months ago  - Hold aspirin  - Cardio consult appreciated  - Continue beta blocker, statin - s/p bare metal stent a few months ago  - Hold aspirin  - Cardio consult appreciated  - Continue beta blocker as BP tolerates

## 2017-05-05 NOTE — PROGRESS NOTE ADULT - PROBLEM SELECTOR PLAN 2
- Anemia of CKD worsened by GI bleed  - s/p 4 units PRBC with modest response Hgb 8.3 from 6.3  - Hold aspirin: family made aware about possibility of worsening CAD   - No anticoagulation  - Serial H/H, hold iron pills  - Trend troponins, ECG  - GI consult Dr Hernández appreciated. - Anemia of CKD worsened by GI bleed  - s/p 4 units PRBC with modest response Hgb 8.3 from 6.3  - Hold aspirin: family made aware about possibility of worsening CAD   - No anticoagulation  - Serial Hgb/Hct, hold iron pills  - Trend troponins, ECG  - GI consult Dr Hernández appreciated.

## 2017-05-05 NOTE — PROGRESS NOTE ADULT - SUBJECTIVE AND OBJECTIVE BOX
Patient is a 82y old  Male who presents with a chief complaint of Worsening leg pain, anemia, ARYA sent from Rehab (04 May 2017 15:56)      HPI:  82 year old male with history of CAD s/p stents (LAD, RCA bare metal around 9/16), A.Fib not on anticoagulation due to GI bleeding, Hypertension, COPD on home O2 2L, SHAYY on nocturnal BIPAP, ex-smoker (smoked 1ppd X 50 years, quit 22 years ago),  Chronic diastolic CHF, Hyperlipidemia, PVD, Iron deficiency anemia, Chronic back pain, Gout, BPH, CKD III with baseline Cr 2, recently admitted to  in 4/17 with anemia of GI bleeding, RLE and RUE DVTs, received pRBCs and IVC filter discharged to rehab with aspirin was sent to  ER on 5/4/17 for worsening anemia with Hb 6, worsening leg pain from ulcers and worsening renal function Cr 3.99.    Pt seen bed side.  Not well oriented currently after receiving Dilaudid for pain in ER.  As per daughters, pt was walking with a walker at rehab and was doing better.  Pt was found to have tarry black stools in ER, guaic positive. (04 May 2017 15:56)    patient fatigue. Mildly confused.  Complains of some pain in her right lower extremity.  He can't delineate the pain better.  He has not had any bowel movements.  Abdomen has been distended. He denies any nausea vomiting fevers chills diaphoresis. He is a poor historian. History is per patient and daughter          PAST MEDICAL & SURGICAL HISTORY:  Sepsis, due to unspecified organism: 2/2 poorly healing wounds b/l  BPH (benign prostatic hypertrophy)  Hyperlipemia  Coronary artery disease  Hypertension  Dyspepsia: On moderate exertion.  Sleep apnea, obstructive: Requires home 02 therapy, and treatment with BIPAP  Atelectasis  Pleural effusion, bilateral  Respiratory failure  Peripheral edema  CRI (chronic renal insufficiency)  Gout  CRF (chronic renal failure)  Benign prostatic hypertrophy  Spinal stenosis  Hypercholesterolemia  GERD (gastroesophageal reflux disease)  CAD (coronary artery disease)  Hypertension  S/P angioplasty with stent  Cataract of left eye  Prostate: Surgery green light procedure.  S/P rotator cuff surgery: Right  S/P angioplasty  Rotator cuff tear, right: repair      MEDICATIONS  (STANDING):  pantoprazole Infusion 8mG/Hr IV Continuous <Continuous>  buDESOnide   0.5 milliGRAM(s) Respule 0.5milliGRAM(s) Inhalation two times a day  doxazosin 8milliGRAM(s) Oral at bedtime  isosorbide   mononitrate ER Tablet (IMDUR) 120milliGRAM(s) Oral daily  diltiazem   CD 120milliGRAM(s) Oral daily  gabapentin 600milliGRAM(s) Oral at bedtime  gabapentin 300milliGRAM(s) Oral daily  allopurinol 100milliGRAM(s) Oral daily  fenofibrate Tablet 145milliGRAM(s) Oral daily  simvastatin 10milliGRAM(s) Oral at bedtime  pramipexole 0.125milliGRAM(s) Oral three times a day  ALBUTerol    90 MICROgram(s) HFA Inhaler 2Puff(s) Inhalation every 6 hours  ammonium lactate 12% Lotion 1Application(s) Topical two times a day  hydrALAZINE 150milliGRAM(s) Oral two times a day  fluticasone propionate 50 MICROgram(s)/spray Nasal Spray 1Spray(s) Alternating Nostrils two times a day  metoprolol 12.5milliGRAM(s) Oral two times a day  cefepime  IVPB 1000milliGRAM(s) IV Intermittent daily    MEDICATIONS  (PRN):  acetaminophen   Tablet 650milliGRAM(s) Oral every 6 hours PRN For Temp greater than 38 C (100.4 F)  traMADol 100milliGRAM(s) Oral every 6 hours PRN Moderate Pain (4 - 6)  aluminum hydroxide/magnesium hydroxide/simethicone Suspension 30milliLiter(s) Oral every 6 hours PRN Dyspepsia  ondansetron Injectable 4milliGRAM(s) IV Push every 6 hours PRN Nausea      Allergies    No Known Allergies    Intolerances        SOCIAL HISTORY:unchanged    FAMILY HISTORY:  No pertinent family history in first degree relatives      REVIEW OF SYSTEMS:    CONSTITUTIONAL: No weakness, fevers or chills  EYES/ENT: No visual changes;  No vertigo or throat pain   NECK: No pain or stiffness  RESPIRATORY: No cough, wheezing, hemoptysis; No shortness of breath  CARDIOVASCULAR: No chest pain or palpitations  GENITOURINARY: No dysuria, frequency or hematuria  NEUROLOGICAL: No numbness or weakness  SKIN: No itching, burning, rashes, or lesions   All other review of systems is negative unless indicated above.    Vital Signs Last 24 Hrs  T(C): 38, Max: 38.3 (05-05 @ 12:58)  T(F): 100.4, Max: 100.9 (05-05 @ 12:58)  HR: 91 (89 - 115)  BP: 128/31 (106/37 - 156/41)  BP(mean): 54 (47 - 78)  RR: 18 (15 - 25)  SpO2: 93% (88% - 100%)    PHYSICAL EXAM:    Constitutional: NAD, well-developed  HEENT: EOMI, throat clear  Neck: No LAD, supple  Respiratory: CTA and P  Cardiovascular: S1 and S2, RRR, no M  Gastrointestinal: BS+, soft, NT/ND, neg HSM,  Extremities: bilat LE peripheral edema, neg clubing, cyanosis  RLE cellulitis  Neurological: A/O x 1, no focal deficits  Psychiatric: Normal mood, normal affect  Skin: No rashes    LABS:  CBC Full  -  ( 05 May 2017 15:19 )  WBC Count : 14.9 K/uL  Hemoglobin : 8.3 g/dL  Hematocrit : 25.1 %  Platelet Count - Automated : 384 K/uL  Mean Cell Volume : 91.9 fl  Mean Cell Hemoglobin : 30.3 pg  Mean Cell Hemoglobin Concentration : 33.0 gm/dL  Auto Neutrophil # : x  Auto Lymphocyte # : x  Auto Monocyte # : x  Auto Eosinophil # : x  Auto Basophil # : x  Auto Neutrophil % : x  Auto Lymphocyte % : x  Auto Monocyte % : x  Auto Eosinophil % : x  Auto Basophil % : x    05-05    149<H>  |  118<H>  |  143<H>  ----------------------------<  109<H>  4.9   |  21<L>  |  3.68<H>    Ca    8.3<L>      05 May 2017 05:10  Mg     2.8     05-05    TPro  6.6  /  Alb  2.4<L>  /  TBili  0.2  /  DBili  x   /  AST  20  /  ALT  15  /  AlkPhos  72  05-04    PT/INR - ( 04 May 2017 12:54 )   PT: 13.6 sec;   INR: 1.25 ratio         PTT - ( 04 May 2017 12:54 )  PTT:38.8 sec        RADIOLOGY & ADDITIONAL STUDIES:  EXAM:  CT ABDOMEN AND PELVIS OC                            PROCEDURE DATE:  05/05/2017        INTERPRETATION:  CT ABDOMEN AND PELVIS OC    HISTORY:  abd pain, distension, do stat, pt must contrast    Technique: CT of the abdomen and pelvis is performed with oral without   intravenous contrast. Axial images are supplemented with coronal and   sagittal reformations. This study was performed using automatic exposure   control (radiation dose reduction software) to obtain a diagnostic image   quality scan with patient dose as low as reasonably achievable.    Contrast:     Oral contrast only    Comparison: CT abdomen and pelvis 6/13/2016    Findings:  LIVER: Normal.  SPLEEN: Normal.  PANCREAS: Normal.  GALLBLADDER/BILIARY TREE: Nondilated. Gallstone is present.  ADRENALS: Normal.  KIDNEYS: No calcification, hydronephrosis, or soft tissue attenuating   renal mass.  LYMPHADENOPATHY/RETROPERITONEUM: No adenopathy.  VASCULATURE: Extensive aortoiliac vascular calcification without   aneurysm. Infrarenal vena cava filter in place.  BOWEL: No bowel related abnormality. Specifically, no bowel obstruction.  PELVIC VISCERA: Calcified BPH in the prostate is noted.  PELVIC LYMPH NODES: No pelvic adenopathy.  PERITONEUM/ABDOMINAL WALL: No free air orascites.  SKELETAL: No acute bony abnormality.  LUNG BASES: Clear.    IMPRESSION:     Preponderance of abdominal visceral adipose tissue without evidence for   obstruction, mass, or ascites.              KENDRA ERAZO   This document has been electronically signed. May  5 2017 11:26AM

## 2017-05-05 NOTE — PROGRESS NOTE ADULT - PROBLEM SELECTOR PLAN 5
- Multiple DVTs RLE RUE  - s/p IVC filter   - Not a candidate for anticoagulation due to recurrent GI bleeding

## 2017-05-06 LAB
ANION GAP SERPL CALC-SCNC: 12 MMOL/L — SIGNIFICANT CHANGE UP (ref 5–17)
BUN SERPL-MCNC: 142 MG/DL — HIGH (ref 7–23)
CALCIUM SERPL-MCNC: 8.2 MG/DL — LOW (ref 8.5–10.1)
CHLORIDE SERPL-SCNC: 121 MMOL/L — HIGH (ref 96–108)
CO2 SERPL-SCNC: 20 MMOL/L — LOW (ref 22–31)
CREAT SERPL-MCNC: 3.67 MG/DL — HIGH (ref 0.5–1.3)
GLUCOSE SERPL-MCNC: 101 MG/DL — HIGH (ref 70–99)
HCT VFR BLD CALC: 25.1 % — LOW (ref 39–50)
HCT VFR BLD CALC: 26.1 % — LOW (ref 39–50)
HCT VFR BLD CALC: 26.7 % — LOW (ref 39–50)
HGB BLD-MCNC: 7.8 G/DL — LOW (ref 13–17)
HGB BLD-MCNC: 8.1 G/DL — LOW (ref 13–17)
HGB BLD-MCNC: 8.3 G/DL — LOW (ref 13–17)
MCHC RBC-ENTMCNC: 28.5 PG — SIGNIFICANT CHANGE UP (ref 27–34)
MCHC RBC-ENTMCNC: 31 GM/DL — LOW (ref 32–36)
MCV RBC AUTO: 91.9 FL — SIGNIFICANT CHANGE UP (ref 80–100)
PLATELET # BLD AUTO: 370 K/UL — SIGNIFICANT CHANGE UP (ref 150–400)
POTASSIUM SERPL-MCNC: 5 MMOL/L — SIGNIFICANT CHANGE UP (ref 3.5–5.3)
POTASSIUM SERPL-SCNC: 5 MMOL/L — SIGNIFICANT CHANGE UP (ref 3.5–5.3)
RBC # BLD: 2.9 M/UL — LOW (ref 4.2–5.8)
RBC # FLD: 17.1 % — HIGH (ref 10.3–14.5)
SODIUM SERPL-SCNC: 153 MMOL/L — HIGH (ref 135–145)
WBC # BLD: 14.5 K/UL — HIGH (ref 3.8–10.5)
WBC # FLD AUTO: 14.5 K/UL — HIGH (ref 3.8–10.5)

## 2017-05-06 RX ORDER — SODIUM CHLORIDE 9 MG/ML
1000 INJECTION, SOLUTION INTRAVENOUS
Qty: 0 | Refills: 0 | Status: DISCONTINUED | OUTPATIENT
Start: 2017-05-06 | End: 2017-05-08

## 2017-05-06 RX ORDER — LACTOBACILLUS ACIDOPHILUS 100MM CELL
1 CAPSULE ORAL DAILY
Qty: 0 | Refills: 0 | Status: DISCONTINUED | OUTPATIENT
Start: 2017-05-06 | End: 2017-05-11

## 2017-05-06 RX ADMIN — ALBUTEROL 2 PUFF(S): 90 AEROSOL, METERED ORAL at 14:28

## 2017-05-06 RX ADMIN — SODIUM CHLORIDE 75 MILLILITER(S): 9 INJECTION, SOLUTION INTRAVENOUS at 23:57

## 2017-05-06 RX ADMIN — Medication 8 MILLIGRAM(S): at 23:39

## 2017-05-06 RX ADMIN — ISOSORBIDE MONONITRATE 120 MILLIGRAM(S): 60 TABLET, EXTENDED RELEASE ORAL at 13:47

## 2017-05-06 RX ADMIN — PRAMIPEXOLE DIHYDROCHLORIDE 0.12 MILLIGRAM(S): 0.12 TABLET ORAL at 05:05

## 2017-05-06 RX ADMIN — Medication 0.5 MILLIGRAM(S): at 08:28

## 2017-05-06 RX ADMIN — Medication 150 MILLIGRAM(S): at 18:33

## 2017-05-06 RX ADMIN — PRAMIPEXOLE DIHYDROCHLORIDE 0.12 MILLIGRAM(S): 0.12 TABLET ORAL at 23:39

## 2017-05-06 RX ADMIN — PRAMIPEXOLE DIHYDROCHLORIDE 0.12 MILLIGRAM(S): 0.12 TABLET ORAL at 13:48

## 2017-05-06 RX ADMIN — Medication 12.5 MILLIGRAM(S): at 18:33

## 2017-05-06 RX ADMIN — PANTOPRAZOLE SODIUM 10 MG/HR: 20 TABLET, DELAYED RELEASE ORAL at 18:33

## 2017-05-06 RX ADMIN — Medication 1 APPLICATION(S): at 18:36

## 2017-05-06 RX ADMIN — Medication 145 MILLIGRAM(S): at 13:46

## 2017-05-06 RX ADMIN — ALBUTEROL 2 PUFF(S): 90 AEROSOL, METERED ORAL at 08:46

## 2017-05-06 RX ADMIN — SODIUM CHLORIDE 75 MILLILITER(S): 9 INJECTION, SOLUTION INTRAVENOUS at 10:38

## 2017-05-06 RX ADMIN — PANTOPRAZOLE SODIUM 10 MG/HR: 20 TABLET, DELAYED RELEASE ORAL at 08:53

## 2017-05-06 RX ADMIN — TRAMADOL HYDROCHLORIDE 100 MILLIGRAM(S): 50 TABLET ORAL at 07:30

## 2017-05-06 RX ADMIN — CEFEPIME 100 MILLIGRAM(S): 1 INJECTION, POWDER, FOR SOLUTION INTRAMUSCULAR; INTRAVENOUS at 13:45

## 2017-05-06 RX ADMIN — Medication 1 SPRAY(S): at 05:07

## 2017-05-06 RX ADMIN — Medication 150 MILLIGRAM(S): at 05:05

## 2017-05-06 RX ADMIN — Medication 100 MILLIGRAM(S): at 13:46

## 2017-05-06 RX ADMIN — SODIUM CHLORIDE 75 MILLILITER(S): 9 INJECTION INTRAMUSCULAR; INTRAVENOUS; SUBCUTANEOUS at 08:22

## 2017-05-06 RX ADMIN — Medication 12.5 MILLIGRAM(S): at 05:05

## 2017-05-06 RX ADMIN — SIMVASTATIN 10 MILLIGRAM(S): 20 TABLET, FILM COATED ORAL at 23:39

## 2017-05-06 RX ADMIN — GABAPENTIN 600 MILLIGRAM(S): 400 CAPSULE ORAL at 23:39

## 2017-05-06 RX ADMIN — Medication 1 TABLET(S): at 13:52

## 2017-05-06 RX ADMIN — Medication 120 MILLIGRAM(S): at 13:46

## 2017-05-06 RX ADMIN — Medication 1 SPRAY(S): at 18:35

## 2017-05-06 RX ADMIN — GABAPENTIN 300 MILLIGRAM(S): 400 CAPSULE ORAL at 13:46

## 2017-05-06 RX ADMIN — TRAMADOL HYDROCHLORIDE 100 MILLIGRAM(S): 50 TABLET ORAL at 05:04

## 2017-05-06 NOTE — PROGRESS NOTE ADULT - SUBJECTIVE AND OBJECTIVE BOX
HPI:  82 year old male with history of CAD s/p stents (LAD, RCA bare metal around ), A.Fib not on anticoagulation due to GI bleeding, Hypertension, COPD on home O2 2L, SHAYY on nocturnal BIPAP, ex-smoker (smoked 1ppd X 50 years, quit 22 years ago),  Chronic diastolic CHF, Hyperlipidemia, PVD, Iron deficiency anemia, Chronic back pain, Gout, BPH, CKD III with baseline Cr 2, recently admitted to  in  with anemia of GI bleeding, RLE and RUE DVTs, received pRBCs and IVC filter discharged to rehab with aspirin was sent to  ER on 17 for worsening anemia with Hb 6, worsening leg pain from ulcers and worsening renal function Cr 3.99.    : still w severe leg pain. Awake and responsive. Daughters at bedside.    PHYSICAL EXAM:  T(C): 38, Max: 38.3 (05-05 @ 12:58)  HR: 91 (89 - 115)  BP: 128/31 (106/37 - 156/41)  RR: 18 (15 - 25)  SpO2: 93% (88% - 100%)    GENERAL: Ill appearing  HEAD:  Atraumatic, Normocephalic  NECK: Supple, No JVD  CHEST/LUNG: Decreased  HEART: Regular rate and rhythm; No murmurs, rubs, or gallops  ABDOMEN: Soft, Nontender, Nondistended; Bowel sounds present  EXTREMITIES:  Rt foot cold and noted to have purplish discoloration.   CNS: no focal defecits          MEDICATIONS  (STANDING):  pantoprazole Infusion 8mG/Hr IV Continuous <Continuous>  buDESOnide   0.5 milliGRAM(s) Respule 0.5milliGRAM(s) Inhalation two times a day  doxazosin 8milliGRAM(s) Oral at bedtime  isosorbide   mononitrate ER Tablet (IMDUR) 120milliGRAM(s) Oral daily  diltiazem   CD 120milliGRAM(s) Oral daily  gabapentin 600milliGRAM(s) Oral at bedtime  gabapentin 300milliGRAM(s) Oral daily  allopurinol 100milliGRAM(s) Oral daily  fenofibrate Tablet 145milliGRAM(s) Oral daily  simvastatin 10milliGRAM(s) Oral at bedtime  pramipexole 0.125milliGRAM(s) Oral three times a day  ALBUTerol    90 MICROgram(s) HFA Inhaler 2Puff(s) Inhalation every 6 hours  ammonium lactate 12% Lotion 1Application(s) Topical two times a day  hydrALAZINE 150milliGRAM(s) Oral two times a day  fluticasone propionate 50 MICROgram(s)/spray Nasal Spray 1Spray(s) Alternating Nostrils two times a day  metoprolol 12.5milliGRAM(s) Oral two times a day  cefepime  IVPB 1000milliGRAM(s) IV Intermittent daily  sodium chloride 0.9%. 1000milliLiter(s) IV Continuous <Continuous>    MEDICATIONS  (PRN):  acetaminophen   Tablet 650milliGRAM(s) Oral every 6 hours PRN For Temp greater than 38 C (100.4 F)  traMADol 100milliGRAM(s) Oral every 6 hours PRN Moderate Pain (4 - 6)  aluminum hydroxide/magnesium hydroxide/simethicone Suspension 30milliLiter(s) Oral every 6 hours PRN Dyspepsia  ondansetron Injectable 4milliGRAM(s) IV Push every 6 hours PRN Nausea      LABS:                        8.3    14.9  )-----------( 384      ( 05 May 2017 15:19 )             25.1     05-05    149<H>  |  118<H>  |  143<H>  ----------------------------<  109<H>  4.9   |  21<L>  |  3.68<H>    Ca    8.3<L>      05 May 2017 05:10  Mg     2.8     05-05    TPro  6.6  /  Alb  2.4<L>  /  TBili  0.2  /  DBili  x   /  AST  20  /  ALT  15  /  AlkPhos  72  05-04    PT/INR - ( 04 May 2017 12:54 )   PT: 13.6 sec;   INR: 1.25 ratio         PTT - ( 04 May 2017 12:54 )  PTT:38.8 sec  Urinalysis Basic - ( 04 May 2017 18:00 )    Color: Yellow / Appearance: Clear / S.020 / pH: x  Gluc: x / Ketone: Negative  / Bili: Small / Urobili: Negative mg/dL   Blood: x / Protein: 15 mg/dL / Nitrite: Negative   Leuk Esterase: Trace / RBC: 0-2 /HPF / WBC 0-2   Sq Epi: x / Non Sq Epi: Occasional / Bacteria: Occasional      Magnesium, Serum: 2.8 mg/dL ( @ 05:10)    CARDIAC MARKERS ( 04 May 2017 12:54 )  <0.015 ng/mL / x     / 99 U/L / x     / x              DVT/GI ppx  Discussed with pt @ bedside

## 2017-05-06 NOTE — PROGRESS NOTE ADULT - SUBJECTIVE AND OBJECTIVE BOX
Patient is a 82y old  Male who presents with a chief complaint of Worsening leg pain, anemia, ARYA sent from Rehab (04 May 2017 15:56)      HPI:  82 year old male with history of CAD s/p stents (LAD, RCA bare metal around 9/16), A.Fib not on anticoagulation due to GI bleeding, Hypertension, COPD on home O2 2L, SHAYY on nocturnal BIPAP, ex-smoker (smoked 1ppd X 50 years, quit 22 years ago),  Chronic diastolic CHF, Hyperlipidemia, PVD, Iron deficiency anemia, Chronic back pain, Gout, BPH, CKD III with baseline Cr 2, recently admitted to  in 4/17 with anemia of GI bleeding, RLE and RUE DVTs, received pRBCs and IVC filter discharged to rehab with aspirin was sent to  ER on 5/4/17 for worsening anemia with Hb 6, worsening leg pain from ulcers and worsening renal function Cr 3.99.    Pt seen bed side.  Not well oriented currently after receiving Dilaudid for pain in ER.  As per daughters, pt was walking with a walker at rehab and was doing better.  Pt was found to have tarry black stools in ER, guaic positive. (04 May 2017 15:56)    pt with  tarry BM, 2 Sm over night  neg N V  neg abd pain x mild wpig, burning, inc with pills, neg radiation  feels hungry  hoffman cath due to urinary retention          PAST MEDICAL & SURGICAL HISTORY:  Sepsis, due to unspecified organism: 2/2 poorly healing wounds b/l  BPH (benign prostatic hypertrophy)  Hyperlipemia  Coronary artery disease  Hypertension  Dyspepsia: On moderate exertion.  Sleep apnea, obstructive: Requires home 02 therapy, and treatment with BIPAP  Atelectasis  Pleural effusion, bilateral  Respiratory failure  Peripheral edema  CRI (chronic renal insufficiency)  Gout  CRF (chronic renal failure)  Benign prostatic hypertrophy  Spinal stenosis  Hypercholesterolemia  GERD (gastroesophageal reflux disease)  CAD (coronary artery disease)  Hypertension  S/P angioplasty with stent  Cataract of left eye  Prostate: Surgery green light procedure.  S/P rotator cuff surgery: Right  S/P angioplasty  Rotator cuff tear, right: repair      MEDICATIONS  (STANDING):  pantoprazole Infusion 8mG/Hr IV Continuous <Continuous>  buDESOnide   0.5 milliGRAM(s) Respule 0.5milliGRAM(s) Inhalation two times a day  doxazosin 8milliGRAM(s) Oral at bedtime  isosorbide   mononitrate ER Tablet (IMDUR) 120milliGRAM(s) Oral daily  diltiazem   CD 120milliGRAM(s) Oral daily  gabapentin 600milliGRAM(s) Oral at bedtime  gabapentin 300milliGRAM(s) Oral daily  allopurinol 100milliGRAM(s) Oral daily  fenofibrate Tablet 145milliGRAM(s) Oral daily  simvastatin 10milliGRAM(s) Oral at bedtime  pramipexole 0.125milliGRAM(s) Oral three times a day  ALBUTerol    90 MICROgram(s) HFA Inhaler 2Puff(s) Inhalation every 6 hours  ammonium lactate 12% Lotion 1Application(s) Topical two times a day  hydrALAZINE 150milliGRAM(s) Oral two times a day  fluticasone propionate 50 MICROgram(s)/spray Nasal Spray 1Spray(s) Alternating Nostrils two times a day  metoprolol 12.5milliGRAM(s) Oral two times a day  cefepime  IVPB 1000milliGRAM(s) IV Intermittent daily  dextrose 5%. 1000milliLiter(s) IV Continuous <Continuous>  lactobacillus acidophilus 1Tablet(s) Oral daily    MEDICATIONS  (PRN):  traMADol 100milliGRAM(s) Oral every 6 hours PRN Moderate Pain (4 - 6)  aluminum hydroxide/magnesium hydroxide/simethicone Suspension 30milliLiter(s) Oral every 6 hours PRN Dyspepsia  ondansetron Injectable 4milliGRAM(s) IV Push every 6 hours PRN Nausea  oxyCODONE  5 mG/acetaminophen 325 mG 1Tablet(s) Oral every 4 hours PRN Severe Pain (7 - 10)      Allergies    No Known Allergies    Intolerances        SOCIAL HISTORY:unchanged    FAMILY HISTORY:  No pertinent family history in first degree relatives      REVIEW OF SYSTEMS:    CONSTITUTIONAL: No weakness, fevers or chills  EYES/ENT: No visual changes;  No vertigo or throat pain   NECK: No pain or stiffness  RESPIRATORY: No cough, wheezing, hemoptysis; No shortness of breath  CARDIOVASCULAR: No chest pain or palpitations  GENITOURINARY: No dysuria, frequency or hematuria  NEUROLOGICAL: No numbness or weakness  SKIN: No itching, burning, rashes, or lesions   All other review of systems is negative unless indicated above.    Vital Signs Last 24 Hrs  T(C): 37.2, Max: 38.5 (05-05 @ 19:02)  T(F): 99, Max: 101.3 (05-05 @ 19:02)  HR: 80 (76 - 110)  BP: 112/60 (112/60 - 166/38)  BP(mean): 71 (54 - 75)  RR: 16 (15 - 27)  SpO2: 93% (90% - 99%)    PHYSICAL EXAM:    Constitutional: NAD, well-developed  HEENT: EOMI, throat clear  Neck: No LAD, supple  Respiratory: CTA and P  Cardiovascular: S1 and S2, RRR, no M  Gastrointestinal: BS+, soft, NT/ND, neg HSM,  Extremities: , neg clubing, cyanosis  RLE ulcers and cellulitis  Neurological: A/O x 3, no focal deficits  Psychiatric: Normal mood, normal affect  Skin: No rashes    LABS:  CBC Full  -  ( 06 May 2017 04:43 )  WBC Count : 14.5 K/uL  Hemoglobin : 8.3 g/dL  Hematocrit : 26.7 %  Platelet Count - Automated : 370 K/uL  Mean Cell Volume : 91.9 fl  Mean Cell Hemoglobin : 28.5 pg  Mean Cell Hemoglobin Concentration : 31.0 gm/dL  Auto Neutrophil # : x  Auto Lymphocyte # : x  Auto Monocyte # : x  Auto Eosinophil # : x  Auto Basophil # : x  Auto Neutrophil % : x  Auto Lymphocyte % : x  Auto Monocyte % : x  Auto Eosinophil % : x  Auto Basophil % : x    05-06    153<H>  |  121<H>  |  142<H>  ----------------------------<  101<H>  5.0   |  20<L>  |  3.67<H>    Ca    8.2<L>      06 May 2017 04:43  Mg     2.8     05-05    TPro  6.6  /  Alb  2.4<L>  /  TBili  0.2  /  DBili  x   /  AST  20  /  ALT  15  /  AlkPhos  72  05-04    PT/INR - ( 04 May 2017 12:54 )   PT: 13.6 sec;   INR: 1.25 ratio         PTT - ( 04 May 2017 12:54 )  PTT:38.8 sec        RADIOLOGY & ADDITIONAL STUDIES:EXAM:  CT ABDOMEN AND PELVIS OC                            PROCEDURE DATE:  05/05/2017        INTERPRETATION:  CT ABDOMEN AND PELVIS OC    HISTORY:  abd pain, distension, do stat, pt must contrast    Technique: CT of the abdomen and pelvis is performed with oral without   intravenous contrast. Axial images are supplemented with coronal and   sagittal reformations. This study was performed using automatic exposure   control (radiation dose reduction software) to obtain a diagnostic image   quality scan with patient dose as low as reasonably achievable.    Contrast:     Oral contrast only    Comparison: CT abdomen and pelvis 6/13/2016    Findings:  LIVER: Normal.  SPLEEN: Normal.  PANCREAS: Normal.  GALLBLADDER/BILIARY TREE: Nondilated. Gallstone is present.  ADRENALS: Normal.  KIDNEYS: No calcification, hydronephrosis, or soft tissue attenuating   renal mass.  LYMPHADENOPATHY/RETROPERITONEUM: No adenopathy.  VASCULATURE: Extensive aortoiliac vascular calcification without   aneurysm. Infrarenal vena cava filter in place.  BOWEL: No bowel related abnormality. Specifically, no bowel obstruction.  PELVIC VISCERA: Calcified BPH in the prostate is noted.  PELVIC LYMPH NODES: No pelvic adenopathy.  PERITONEUM/ABDOMINAL WALL: No free air orascites.  SKELETAL: No acute bony abnormality.  LUNG BASES: Clear.    IMPRESSION:     Preponderance of abdominal visceral adipose tissue without evidence for   obstruction, mass, or ascites.              KENDRA ERAZO   This document has been electronically signed. May  5 2017 11:26AM

## 2017-05-06 NOTE — PROGRESS NOTE ADULT - SUBJECTIVE AND OBJECTIVE BOX
NEPHROLOGY INTERVAL HPI/OVERNIGHT EVENTS:  Event noted hoffman had to be placed due to retention and + uop of 700 cc overnight  IVF changed  no further LGI bleeding  Endoscopy held due to instability  on protonix gtt with stable hgb      MEDICATIONS  (STANDING):  pantoprazole Infusion 8mG/Hr IV Continuous <Continuous>  buDESOnide   0.5 milliGRAM(s) Respule 0.5milliGRAM(s) Inhalation two times a day  doxazosin 8milliGRAM(s) Oral at bedtime  isosorbide   mononitrate ER Tablet (IMDUR) 120milliGRAM(s) Oral daily  diltiazem   CD 120milliGRAM(s) Oral daily  gabapentin 600milliGRAM(s) Oral at bedtime  gabapentin 300milliGRAM(s) Oral daily  allopurinol 100milliGRAM(s) Oral daily  fenofibrate Tablet 145milliGRAM(s) Oral daily  simvastatin 10milliGRAM(s) Oral at bedtime  pramipexole 0.125milliGRAM(s) Oral three times a day  ALBUTerol    90 MICROgram(s) HFA Inhaler 2Puff(s) Inhalation every 6 hours  ammonium lactate 12% Lotion 1Application(s) Topical two times a day  hydrALAZINE 150milliGRAM(s) Oral two times a day  fluticasone propionate 50 MICROgram(s)/spray Nasal Spray 1Spray(s) Alternating Nostrils two times a day  metoprolol 12.5milliGRAM(s) Oral two times a day  cefepime  IVPB 1000milliGRAM(s) IV Intermittent daily  dextrose 5%. 1000milliLiter(s) IV Continuous <Continuous>  lactobacillus acidophilus 1Tablet(s) Oral daily    MEDICATIONS  (PRN):  traMADol 100milliGRAM(s) Oral every 6 hours PRN Moderate Pain (4 - 6)  aluminum hydroxide/magnesium hydroxide/simethicone Suspension 30milliLiter(s) Oral every 6 hours PRN Dyspepsia  ondansetron Injectable 4milliGRAM(s) IV Push every 6 hours PRN Nausea  oxyCODONE  5 mG/acetaminophen 325 mG 1Tablet(s) Oral every 4 hours PRN Severe Pain (7 - 10)      Allergies    No Known Allergies    Intolerances        I&O's Detail  I & Os for 24h ending 06 May 2017 07:00  =============================================  IN:    sodium chloride 0.9%: 716 ml    Packed Red Blood Cells: 675 ml    Oral Fluid: 240 ml    pantoprazole Infusion: 176 ml    IV PiggyBack: 150 ml    Total IN: 1957 ml  ---------------------------------------------  OUT:    Ureteral Catheter: 1525 ml    Total OUT: 1525 ml  ---------------------------------------------  Total NET: 432 ml    I & Os for current day (as of 06 May 2017 12:18)  =============================================  IN:    sodium chloride 0.9%: 253 ml    pantoprazole Infusion: 38 ml    Total IN: 291 ml  ---------------------------------------------  OUT:    Total OUT: 0 ml  ---------------------------------------------  Total NET: 291 ml      Vital Signs Last 24 Hrs  T(C): 37.2, Max: 38.5 ( @ 19:02)  T(F): 99, Max: 101.3 ( @ 19:02)  HR: 80 (76 - 110)  BP: 112/60 (112/60 - 166/38)  BP(mean): 71 (54 - 75)  RR: 16 (15 - 27)  SpO2: 93% (90% - 99%)  Daily     Daily Weight in k.4 (06 May 2017 06:00)    PHYSICAL EXAM:  General: alert. awake more oriented to day  HEENT: MMM  CV: s1s2 rrr  LUNGS: B/L CTA  EXT: LE dressing in place     LABS:                        8.3    14.5  )-----------( 370      ( 06 May 2017 04:43 )             26.7     05-06    153<H>  |  121<H>  |  142<H>  ----------------------------<  101<H>  5.0   |  20<L>  |  3.67<H>    Ca    8.2<L>      06 May 2017 04:43  Mg     2.8     05-05    TPro  6.6  /  Alb  2.4<L>  /  TBili  0.2  /  DBili  x   /  AST  20  /  ALT  15  /  AlkPhos  72  05-04    PT/INR - ( 04 May 2017 12:54 )   PT: 13.6 sec;   INR: 1.25 ratio         PTT - ( 04 May 2017 12:54 )  PTT:38.8 sec  Urinalysis Basic - ( 04 May 2017 18:00 )    Color: Yellow / Appearance: Clear / S.020 / pH: x  Gluc: x / Ketone: Negative  / Bili: Small / Urobili: Negative mg/dL   Blood: x / Protein: 15 mg/dL / Nitrite: Negative   Leuk Esterase: Trace / RBC: 0-2 /HPF / WBC 0-2   Sq Epi: x / Non Sq Epi: Occasional / Bacteria: Occasional    CT Abd and Pelvic Non contrast    IMPRESSION:     Preponderance of abdominal visceral adipose tissue without evidence for   obstruction, mass, or ascites.

## 2017-05-06 NOTE — PROGRESS NOTE ADULT - SUBJECTIVE AND OBJECTIVE BOX
remained stable overnight with acceptable vital signs and urine output    HCT stable at 26; Cr decreased marginally    still has R foot pain and generalized discomfort  MEDICATIONS  (STANDING):  pantoprazole Infusion 8mG/Hr IV Continuous <Continuous>  buDESOnide   0.5 milliGRAM(s) Respule 0.5milliGRAM(s) Inhalation two times a day  doxazosin 8milliGRAM(s) Oral at bedtime  isosorbide   mononitrate ER Tablet (IMDUR) 120milliGRAM(s) Oral daily  diltiazem   CD 120milliGRAM(s) Oral daily  gabapentin 600milliGRAM(s) Oral at bedtime  gabapentin 300milliGRAM(s) Oral daily  allopurinol 100milliGRAM(s) Oral daily  fenofibrate Tablet 145milliGRAM(s) Oral daily  simvastatin 10milliGRAM(s) Oral at bedtime  pramipexole 0.125milliGRAM(s) Oral three times a day  ALBUTerol    90 MICROgram(s) HFA Inhaler 2Puff(s) Inhalation every 6 hours  ammonium lactate 12% Lotion 1Application(s) Topical two times a day  hydrALAZINE 150milliGRAM(s) Oral two times a day  fluticasone propionate 50 MICROgram(s)/spray Nasal Spray 1Spray(s) Alternating Nostrils two times a day  metoprolol 12.5milliGRAM(s) Oral two times a day  cefepime  IVPB 1000milliGRAM(s) IV Intermittent daily  sodium chloride 0.9%. 1000milliLiter(s) IV Continuous <Continuous>    MEDICATIONS  (PRN):  acetaminophen   Tablet 650milliGRAM(s) Oral every 6 hours PRN For Temp greater than 38 C (100.4 F)  traMADol 100milliGRAM(s) Oral every 6 hours PRN Moderate Pain (4 - 6)  aluminum hydroxide/magnesium hydroxide/simethicone Suspension 30milliLiter(s) Oral every 6 hours PRN Dyspepsia  ondansetron Injectable 4milliGRAM(s) IV Push every 6 hours PRN Nausea      Allergies    No Known Allergies    Intolerances        Flatus: [ ] YES [ ] NO             Bowel Movement: [ ] YES [ ] NO  Pain (0-10):            Pain Control Adequate: [ ] YES [ ] NO  Nausea: [ ] YES [ ] NO            Vomiting: [ ] YES [ ] NO  Diarrhea: [ ] YES [ ] NO         Constipation: [ ] YES [ ] NO     Chest Pain: [ ] YES [ ] NO    SOB:  [ ] YES [ ] NO    Vital Signs Last 24 Hrs  T(C): 37.2, Max: 38.5 ( @ 19:02)  T(F): 99, Max: 101.3 ( @ 19:02)  HR: 76 (76 - 110)  BP: 121/34 (109/36 - 166/38)  BP(mean): 55 (54 - 78)  RR: 17 (15 - 27)  SpO2: 92% (88% - 99%)    I&O's Summary    I & Os for current day (as of 06 May 2017 08:07)  =============================================  IN: 1957 ml / OUT: 1525 ml / NET: 432 ml      Physical Exam:  General: NAD, resting comfortably  Pulmonary: normal resp effort, CTA-B  Cardiovascular: NSR  Abdominal: soft, NT/ND  Extremities: L foot warm with intact motor and sensation, ulcers on dorsal foot and leg                     R foot remains cooler than left and cyanotic but not mottled; motor impaired; cap refill ~ 2                       seconds  Neuro: A/O x 3, CNs II-XII grossly intact, normal motor/sensation, no focal deficits                   LABS:                        8.3    14.5  )-----------( 370      ( 06 May 2017 04:43 )             26.7     -    153<H>  |  121<H>  |  142<H>  ----------------------------<  101<H>  5.0   |  20<L>  |  3.67<H>    Ca    8.2<L>      06 May 2017 04:43  Mg     2.8     -    TPro  6.6  /  Alb  2.4<L>  /  TBili  0.2  /  DBili  x   /  AST  20  /  ALT  15  /  AlkPhos  72  05-    PT/INR - ( 04 May 2017 12:54 )   PT: 13.6 sec;   INR: 1.25 ratio         PTT - ( 04 May 2017 12:54 )  PTT:38.8 sec  Urinalysis Basic - ( 04 May 2017 18:00 )    Color: Yellow / Appearance: Clear / S.020 / pH: x  Gluc: x / Ketone: Negative  / Bili: Small / Urobili: Negative mg/dL   Blood: x / Protein: 15 mg/dL / Nitrite: Negative   Leuk Esterase: Trace / RBC: 0-2 /HPF / WBC 0-2   Sq Epi: x / Non Sq Epi: Occasional / Bacteria: Occasional      LIVER FUNCTIONS - ( 04 May 2017 12:54 )  Alb: 2.4 g/dL / Pro: 6.6 gm/dL / ALK PHOS: 72 U/L / ALT: 15 U/L / AST: 20 U/L / GGT: x           CAPILLARY BLOOD GLUCOSE      RADIOLOGY & ADDITIONAL TESTS:

## 2017-05-06 NOTE — PROGRESS NOTE ADULT - PROBLEM SELECTOR PLAN 3
- Cr 3.68 from 3.99  - Nephro consult appreciated   - IV lasix before transfusions as per Dr Sanabria  - no change in creat today  - has worsening hypernatremia - see ivf orders

## 2017-05-06 NOTE — PROGRESS NOTE ADULT - ASSESSMENT
82 y/o male with h/o COPD, CHF, chronic renal failure, paroxysmal A-fib.with, hematochezia s/p bleeding scan and flex sigmoidoscopy, anemia, chronic LE stasis dermatitis  was was admitted for increased weakness and wheepy right LE and foot. The patient had a RLE ABDULAZIZ boot for chronic venous stasis ulcers and lower extremity chronic edema that was removed recently.. The daughter reports increased oozing from LE ulcers; she reports thet the right foot ulcers were debrided the day PTA. No fever or chills reported. In Ed patient noted to have Hb 6. He received vancomycin 1 gm IV x 1 and cefepime 1 gm IV x 1.    1. Chronic right LE stasis ulcers. Chronic dermatitis with likely superimposed cellulitis. Severe PVD. ARF. Severe anemia.  -has rectal bleeding s/p blood transfusion  -f/u BC x 2  -on cefepime 1 gm IV qd # 3  -tolerating abx well so far; no side effects noted   -continue abx coverage  -monitor BMP  -local wound care  -monitor temps  -f/u CBC  -supportive care  2. Other issues: COPD, CHF, chronic renal failure, paroxysmal A-fib.with, hematochezia   -care per medicine

## 2017-05-06 NOTE — PROGRESS NOTE ADULT - ASSESSMENT
anemia, likely multifactorial  I suspect that there is a component of GI bleeding, appears stabilized on PPI gtt and hct stable  He has received 4 units of packed red blood cells.      pt now more alert and awake      I had an extensive discussion with the ppatient's daughter. Secondary to his multiple comorbid disorders, at this time, we will delay endoscopic evaluation. It is likely that he would require intubation and may have respiratory compromise.  He likely also septic.  We'll continue Protonix drip    Serial H&H's every 6 hours.              Lower extremity DVT, status post IVC filter    Sepsis likely secondary to lower extremity cellulitis and antibiotics will be continued.  Infectious disease note was appreciated.    Advanced COPD and obstructive sleep apnea reviewed.

## 2017-05-06 NOTE — PROGRESS NOTE ADULT - ASSESSMENT
# ARYA due to pre-renal azothemia with CKD stage 4 at baseline ( 2.0-2.5)  # Anemia due to acute blood loss anemia  # Sepsis? due to LE ulcers  # Multiple DVT on RUE/RLE with s/p IVC fileter  # HTN    PLAN  - moniter off diuretics ( only inbetween blood transfusion) and fu response to the PRBC transfusion  - continue with current anti HTN  - strick I/O   - abx as per ID, pt given one dose of vanc in ER  family updated at bedside    5/6 MK  - ARYA/CKD stage 4, non oliguric with slow recovery indicative of likely compnent of ATN. Agree with IVF composition to d5w and will moniter with advancing of the diet and uop.  - Severe PVD with ischmic rt foot.  ? cellulitis    abx as per ID.  dw dr dugan and family regarding timing of angiogram and possibiltuy of HD discussed with family. Will moniter the progress over th weekend

## 2017-05-06 NOTE — PROGRESS NOTE ADULT - PROBLEM SELECTOR PLAN 4
- s/p bare metal stent a few months ago  - Hold aspirin  - Cardio consult appreciated  - Continue beta blocker as BP tolerates

## 2017-05-06 NOTE — PROGRESS NOTE ADULT - PROBLEM SELECTOR PLAN 1
- Lactate .08 now but WBC elevated, chronic lower extremity ulcers, ARYA   - ID consult appreciated  - Antibiotics started (namely vancomcyin and cefepime), follow-up culture data  - Vascular consult appreciated.  Severe PVD Ischemic Rt goot and ulcers.  Currently not an OR candidate  - Gentle IV fluds NS 75ml/hour  - Multisystem organ failure (cardiac, renal, GI bleed, PVD, ischemic Rt foot and ulcers) with sepsis.  Consult intensivist

## 2017-05-06 NOTE — PROGRESS NOTE ADULT - SUBJECTIVE AND OBJECTIVE BOX
Patient is a 82y old  Male who presents with a chief complaint of weakness  HPI:  82 y/o male with h/of COPD, CHF, chronic renal failure, paroxysmal A-fib.with, hematochezia s/p bleeding scan and flex sigmoidoscopy, anemia, chronic LE stasis dermatitis  was was admitted for increased weakness and wheepy right LE and foot. The patient had a RLE ABDULAZIZ boot for chronic venous stasis ulcers and lower extremity chronic edema that was removed recently.. The daughter reports increased oozing from LE ulcers; she reports thet the right foot ulcers were debrided the day PTA. No fever or chills reported. In Ed patient noted to have Hb 6. He received vancomycin 1 gm IV x 1 and cefepime 1 gm IV x 1.    Lying in bed in NAD  Weak looking  Denies pain    MEDICATIONS  (STANDING):  pantoprazole Infusion 8mG/Hr IV Continuous <Continuous>  buDESOnide   0.5 milliGRAM(s) Respule 0.5milliGRAM(s) Inhalation two times a day  doxazosin 8milliGRAM(s) Oral at bedtime  isosorbide   mononitrate ER Tablet (IMDUR) 120milliGRAM(s) Oral daily  diltiazem   CD 120milliGRAM(s) Oral daily  gabapentin 600milliGRAM(s) Oral at bedtime  gabapentin 300milliGRAM(s) Oral daily  allopurinol 100milliGRAM(s) Oral daily  fenofibrate Tablet 145milliGRAM(s) Oral daily  simvastatin 10milliGRAM(s) Oral at bedtime  pramipexole 0.125milliGRAM(s) Oral three times a day  ALBUTerol    90 MICROgram(s) HFA Inhaler 2Puff(s) Inhalation every 6 hours  ammonium lactate 12% Lotion 1Application(s) Topical two times a day  hydrALAZINE 150milliGRAM(s) Oral two times a day  fluticasone propionate 50 MICROgram(s)/spray Nasal Spray 1Spray(s) Alternating Nostrils two times a day  metoprolol 12.5milliGRAM(s) Oral two times a day  cefepime  IVPB 1000milliGRAM(s) IV Intermittent daily  dextrose 5%. 1000milliLiter(s) IV Continuous <Continuous>    MEDICATIONS  (PRN):  traMADol 100milliGRAM(s) Oral every 6 hours PRN Moderate Pain (4 - 6)  aluminum hydroxide/magnesium hydroxide/simethicone Suspension 30milliLiter(s) Oral every 6 hours PRN Dyspepsia  ondansetron Injectable 4milliGRAM(s) IV Push every 6 hours PRN Nausea  oxyCODONE  5 mG/acetaminophen 325 mG 1Tablet(s) Oral every 4 hours PRN Severe Pain (7 - 10)      Vital Signs Last 24 Hrs  T(C): 37.2, Max: 38.5 (05-05 @ 19:02)  T(F): 99, Max: 101.3 (05-05 @ 19:02)  HR: 80 (76 - 110)  BP: 112/60 (112/60 - 166/38)  BP(mean): 71 (54 - 75)  RR: 16 (15 - 27)  SpO2: 93% (90% - 99%)    Physical Exam:    Constitutional: frail looking  HEENT: NC/AT, EOMI, PERRLA  Neck: supple  Back: no tenderness  Respiratory: few basal rales  Cardiovascular: S1S2 regular, no murmurs  Abdomen: soft, not tender, not distended, positive BS  Genitourinary: deferred  Rectal: deferred  Musculoskeletal: no muscle tenderness, no joint swelling or tenderness  Extremities: b/l chronic skin changes with edema; RLE: dusky and colder foot; dorsal aspect of foot and lower ankle superficial ulcers with serous discharge; surrounding erythema and edema - slightly improved  Neurological: confused, moving all extremities, no focal deficits  Skin: no rashes    Labs:                        8.3    14.5  )-----------( 370      ( 06 May 2017 04:43 )             26.7     05-06    153<H>  |  121<H>  |  142<H>  ----------------------------<  101<H>  5.0   |  20<L>  |  3.67<H>    Ca    8.2<L>      06 May 2017 04:43  Mg     2.8     05-05    TPro  6.6  /  Alb  2.4<L>  /  TBili  0.2  /  DBili  x   /  AST  20  /  ALT  15  /  AlkPhos  72  05-04               6.8    14.1  )-----------( 375      ( 05 May 2017 05:10 )             20.1     05-05    149<H>  |  118<H>  |  143<H>  ----------------------------<  109<H>  4.9   |  21<L>  |  3.68<H>    Ca    8.3<L>      05 May 2017 05:10  Mg     2.8     05-05    TPro  6.6  /  Alb  2.4<L>  /  TBili  0.2  /  DBili  x   /  AST  20  /  ALT  15  /  AlkPhos  72  05-04               6.3    10.8  )-----------( 398      ( 04 May 2017 12:54 )             21.8     05-04    145  |  112<H>  |  138<H>  ----------------------------<  123<H>  5.5<H>   |  19<L>  |  3.99<H>    Ca    8.8      04 May 2017 12:54    TPro  6.6  /  Alb  2.4<L>  /  TBili  0.2  /  DBili  x   /  AST  20  /  ALT  15  /  AlkPhos  72  05-04     LIVER FUNCTIONS - ( 04 May 2017 12:54 )  Alb: 2.4 g/dL / Pro: 6.6 gm/dL / ALK PHOS: 72 U/L / ALT: 15 U/L / AST: 20 U/L / GGT: x           Culture - Blood (05.04.17 @ 13:21)    Specimen Source: .Blood None    Culture Results:   No growth to date.        Radiology:    Advanced directives addressed: full resuscitation

## 2017-05-06 NOTE — PROGRESS NOTE ADULT - ASSESSMENT
multiorgan system failure but remains stable with vitals and urine output acceptable; ischemic but viable R foot    discussed with family (daughters) and patient (he was lucid enough to understand) that conservative measures (i.e. heparinization) not an option in setting of GI bleed and that angiography and catheter intervention has risk of precipitating dialysis dependent renal failure but provides opportunity for improving pain and potentially limb salvage.      Will discuss with Renal re attempt at "optimization" with mucormyst and gentle hydration and re evaluate tomorrow.

## 2017-05-06 NOTE — PROGRESS NOTE ADULT - PROBLEM SELECTOR PLAN 2
- Anemia of CKD worsened by GI bleed  - s/p 4 units PRBC with modest response Hgb 8.3 from 6.3  - Hold aspirin: family made aware about possibility of worsening CAD   - No anticoagulation  - Serial Hgb/Hct, hold iron pills  - Trend troponins, ECG  - GI consult Dr Hernández appreciated.  - hg holding at 8.3 - no transfusion for now

## 2017-05-07 LAB
ANION GAP SERPL CALC-SCNC: 11 MMOL/L — SIGNIFICANT CHANGE UP (ref 5–17)
BASOPHILS # BLD AUTO: 0.1 K/UL — SIGNIFICANT CHANGE UP (ref 0–0.2)
BASOPHILS NFR BLD AUTO: 0.5 % — SIGNIFICANT CHANGE UP (ref 0–2)
BUN SERPL-MCNC: 136 MG/DL — HIGH (ref 7–23)
CALCIUM SERPL-MCNC: 8.2 MG/DL — LOW (ref 8.5–10.1)
CHLORIDE SERPL-SCNC: 115 MMOL/L — HIGH (ref 96–108)
CO2 SERPL-SCNC: 22 MMOL/L — SIGNIFICANT CHANGE UP (ref 22–31)
CREAT SERPL-MCNC: 3.42 MG/DL — HIGH (ref 0.5–1.3)
EOSINOPHIL # BLD AUTO: 0.3 K/UL — SIGNIFICANT CHANGE UP (ref 0–0.5)
EOSINOPHIL NFR BLD AUTO: 1.9 % — SIGNIFICANT CHANGE UP (ref 0–6)
GLUCOSE SERPL-MCNC: 166 MG/DL — HIGH (ref 70–99)
HCT VFR BLD CALC: 23.9 % — LOW (ref 39–50)
HCT VFR BLD CALC: 24.7 % — LOW (ref 39–50)
HCT VFR BLD CALC: 25.7 % — LOW (ref 39–50)
HGB BLD-MCNC: 7.4 G/DL — LOW (ref 13–17)
HGB BLD-MCNC: 8 G/DL — LOW (ref 13–17)
HGB BLD-MCNC: 8 G/DL — LOW (ref 13–17)
LDH SERPL L TO P-CCNC: 350 U/L — HIGH (ref 50–242)
LYMPHOCYTES # BLD AUTO: 0.4 K/UL — LOW (ref 1–3.3)
LYMPHOCYTES # BLD AUTO: 2.6 % — LOW (ref 13–44)
MCHC RBC-ENTMCNC: 28.5 PG — SIGNIFICANT CHANGE UP (ref 27–34)
MCHC RBC-ENTMCNC: 31 GM/DL — LOW (ref 32–36)
MCV RBC AUTO: 92 FL — SIGNIFICANT CHANGE UP (ref 80–100)
MONOCYTES # BLD AUTO: 0.9 K/UL — SIGNIFICANT CHANGE UP (ref 0–0.9)
MONOCYTES NFR BLD AUTO: 6.7 % — SIGNIFICANT CHANGE UP (ref 2–14)
NEUTROPHILS # BLD AUTO: 12.1 K/UL — HIGH (ref 1.8–7.4)
NEUTROPHILS NFR BLD AUTO: 88.3 % — HIGH (ref 43–77)
PLATELET # BLD AUTO: 354 K/UL — SIGNIFICANT CHANGE UP (ref 150–400)
POTASSIUM SERPL-MCNC: 4.8 MMOL/L — SIGNIFICANT CHANGE UP (ref 3.5–5.3)
POTASSIUM SERPL-SCNC: 4.8 MMOL/L — SIGNIFICANT CHANGE UP (ref 3.5–5.3)
RBC # BLD: 2.6 M/UL — LOW (ref 4.2–5.8)
RBC # FLD: 17.8 % — HIGH (ref 10.3–14.5)
SODIUM SERPL-SCNC: 148 MMOL/L — HIGH (ref 135–145)
WBC # BLD: 13.7 K/UL — HIGH (ref 3.8–10.5)
WBC # FLD AUTO: 13.7 K/UL — HIGH (ref 3.8–10.5)

## 2017-05-07 RX ORDER — ACETYLCYSTEINE 200 MG/ML
1200 VIAL (ML) MISCELLANEOUS EVERY 12 HOURS
Qty: 0 | Refills: 0 | Status: DISCONTINUED | OUTPATIENT
Start: 2017-05-07 | End: 2017-05-11

## 2017-05-07 RX ADMIN — PRAMIPEXOLE DIHYDROCHLORIDE 0.12 MILLIGRAM(S): 0.12 TABLET ORAL at 06:04

## 2017-05-07 RX ADMIN — ALBUTEROL 2 PUFF(S): 90 AEROSOL, METERED ORAL at 19:34

## 2017-05-07 RX ADMIN — Medication 1 APPLICATION(S): at 17:32

## 2017-05-07 RX ADMIN — SODIUM CHLORIDE 75 MILLILITER(S): 9 INJECTION, SOLUTION INTRAVENOUS at 15:53

## 2017-05-07 RX ADMIN — PANTOPRAZOLE SODIUM 10 MG/HR: 20 TABLET, DELAYED RELEASE ORAL at 19:52

## 2017-05-07 RX ADMIN — Medication 120 MILLIGRAM(S): at 11:40

## 2017-05-07 RX ADMIN — Medication 145 MILLIGRAM(S): at 11:39

## 2017-05-07 RX ADMIN — SIMVASTATIN 10 MILLIGRAM(S): 20 TABLET, FILM COATED ORAL at 22:10

## 2017-05-07 RX ADMIN — PANTOPRAZOLE SODIUM 10 MG/HR: 20 TABLET, DELAYED RELEASE ORAL at 06:05

## 2017-05-07 RX ADMIN — Medication 12.5 MILLIGRAM(S): at 17:35

## 2017-05-07 RX ADMIN — Medication 1 TABLET(S): at 11:40

## 2017-05-07 RX ADMIN — PRAMIPEXOLE DIHYDROCHLORIDE 0.12 MILLIGRAM(S): 0.12 TABLET ORAL at 15:36

## 2017-05-07 RX ADMIN — Medication 1 SPRAY(S): at 17:32

## 2017-05-07 RX ADMIN — Medication 100 MILLIGRAM(S): at 11:39

## 2017-05-07 RX ADMIN — Medication 150 MILLIGRAM(S): at 17:35

## 2017-05-07 RX ADMIN — ISOSORBIDE MONONITRATE 120 MILLIGRAM(S): 60 TABLET, EXTENDED RELEASE ORAL at 11:41

## 2017-05-07 RX ADMIN — Medication 1 SPRAY(S): at 06:01

## 2017-05-07 RX ADMIN — PRAMIPEXOLE DIHYDROCHLORIDE 0.12 MILLIGRAM(S): 0.12 TABLET ORAL at 22:09

## 2017-05-07 RX ADMIN — Medication 1200 MILLIGRAM(S): at 17:34

## 2017-05-07 RX ADMIN — Medication 150 MILLIGRAM(S): at 06:04

## 2017-05-07 RX ADMIN — Medication 8 MILLIGRAM(S): at 22:11

## 2017-05-07 RX ADMIN — GABAPENTIN 300 MILLIGRAM(S): 400 CAPSULE ORAL at 11:40

## 2017-05-07 RX ADMIN — GABAPENTIN 600 MILLIGRAM(S): 400 CAPSULE ORAL at 22:09

## 2017-05-07 RX ADMIN — Medication 0.5 MILLIGRAM(S): at 19:34

## 2017-05-07 RX ADMIN — ALBUTEROL 2 PUFF(S): 90 AEROSOL, METERED ORAL at 14:30

## 2017-05-07 RX ADMIN — Medication 12.5 MILLIGRAM(S): at 06:04

## 2017-05-07 RX ADMIN — CEFEPIME 100 MILLIGRAM(S): 1 INJECTION, POWDER, FOR SOLUTION INTRAMUSCULAR; INTRAVENOUS at 12:27

## 2017-05-07 NOTE — PROGRESS NOTE ADULT - SUBJECTIVE AND OBJECTIVE BOX
HPI:  82 year old male with history of CAD s/p stents (LAD, RCA bare metal around ), A.Fib not on anticoagulation due to GI bleeding, Hypertension, COPD on home O2 2L, SHAYY on nocturnal BIPAP, ex-smoker (smoked 1ppd X 50 years, quit 22 years ago),  Chronic diastolic CHF, Hyperlipidemia, PVD, Iron deficiency anemia, Chronic back pain, Gout, BPH, CKD III with baseline Cr 2, recently admitted to  in  with anemia of GI bleeding, RLE and RUE DVTs, received pRBCs and IVC filter discharged to rehab with aspirin was sent to  ER on 17 for worsening anemia with Hb 6, worsening leg pain from ulcers and worsening renal function Cr 3.99.    : still w severe leg pain. Awake and responsive. Daughters at bedside.    : no acute distress. Pain better controlled with percocet.    PHYSICAL EXAM:  T(C): 38, Max: 38.3 (05-05 @ 12:58)  HR: 91 (89 - 115)  BP: 128/31 (106/37 - 156/41)  RR: 18 (15 - 25)  SpO2: 93% (88% - 100%)    GENERAL: Ill appearing  HEAD:  Atraumatic, Normocephalic  NECK: Supple, No JVD  CHEST/LUNG: bilat ronchi w decreased breath sounds bases  HEART: Regular rate and rhythm; No murmurs, rubs, or gallops  ABDOMEN: Soft, Nontender, Nondistended; Bowel sounds present  EXTREMITIES:  Rt foot cold and noted to have purplish discoloration.   CNS: no focal defecits          MEDICATIONS  (STANDING):  pantoprazole Infusion 8mG/Hr IV Continuous <Continuous>  buDESOnide   0.5 milliGRAM(s) Respule 0.5milliGRAM(s) Inhalation two times a day  doxazosin 8milliGRAM(s) Oral at bedtime  isosorbide   mononitrate ER Tablet (IMDUR) 120milliGRAM(s) Oral daily  diltiazem   CD 120milliGRAM(s) Oral daily  gabapentin 600milliGRAM(s) Oral at bedtime  gabapentin 300milliGRAM(s) Oral daily  allopurinol 100milliGRAM(s) Oral daily  fenofibrate Tablet 145milliGRAM(s) Oral daily  simvastatin 10milliGRAM(s) Oral at bedtime  pramipexole 0.125milliGRAM(s) Oral three times a day  ALBUTerol    90 MICROgram(s) HFA Inhaler 2Puff(s) Inhalation every 6 hours  ammonium lactate 12% Lotion 1Application(s) Topical two times a day  hydrALAZINE 150milliGRAM(s) Oral two times a day  fluticasone propionate 50 MICROgram(s)/spray Nasal Spray 1Spray(s) Alternating Nostrils two times a day  metoprolol 12.5milliGRAM(s) Oral two times a day  cefepime  IVPB 1000milliGRAM(s) IV Intermittent daily  sodium chloride 0.9%. 1000milliLiter(s) IV Continuous <Continuous>    MEDICATIONS  (PRN):  acetaminophen   Tablet 650milliGRAM(s) Oral every 6 hours PRN For Temp greater than 38 C (100.4 F)  traMADol 100milliGRAM(s) Oral every 6 hours PRN Moderate Pain (4 - 6)  aluminum hydroxide/magnesium hydroxide/simethicone Suspension 30milliLiter(s) Oral every 6 hours PRN Dyspepsia  ondansetron Injectable 4milliGRAM(s) IV Push every 6 hours PRN Nausea      LABS:                        8.3    14.9  )-----------( 384      ( 05 May 2017 15:19 )             25.1     05-05    149<H>  |  118<H>  |  143<H>  ----------------------------<  109<H>  4.9   |  21<L>  |  3.68<H>    Ca    8.3<L>      05 May 2017 05:10  Mg     2.8     -    TPro  6.6  /  Alb  2.4<L>  /  TBili  0.2  /  DBili  x   /  AST  20  /  ALT  15  /  AlkPhos  72  -    PT/INR - ( 04 May 2017 12:54 )   PT: 13.6 sec;   INR: 1.25 ratio         PTT - ( 04 May 2017 12:54 )  PTT:38.8 sec  Urinalysis Basic - ( 04 May 2017 18:00 )    Color: Yellow / Appearance: Clear / S.020 / pH: x  Gluc: x / Ketone: Negative  / Bili: Small / Urobili: Negative mg/dL   Blood: x / Protein: 15 mg/dL / Nitrite: Negative   Leuk Esterase: Trace / RBC: 0-2 /HPF / WBC 0-2   Sq Epi: x / Non Sq Epi: Occasional / Bacteria: Occasional      Magnesium, Serum: 2.8 mg/dL ( @ 05:10)    CARDIAC MARKERS ( 04 May 2017 12:54 )  <0.015 ng/mL / x     / 99 U/L / x     / x              DVT/GI ppx  Discussed with pt @ bedside

## 2017-05-07 NOTE — PROGRESS NOTE ADULT - ASSESSMENT
anemia, likely multifactorial  I suspect that there is a component of GI bleeding, appears stabilized on PPI gtt and hct stable  He has received 4 units of packed red blood cells and getting 5th one now    will delay EGD due to low o2 sat, DW family        pt now more alert and awake      I had an extensive discussion with the patient's daughter. Secondary to his multiple comorbid disorders, at this time, we will delay endoscopic evaluation. It is likely that he would require intubation and may have respiratory compromise.  He likely also septic.  We'll continue Protonix drip    Serial H&H's every 6 hours.              Lower extremity DVT, status post IVC filter    Sepsis likely secondary to lower extremity cellulitis and antibiotics will be continued.  Infectious disease note was appreciated.    Advanced COPD and obstructive sleep apnea reviewed.

## 2017-05-07 NOTE — PROGRESS NOTE ADULT - ASSESSMENT
# ARYA due to pre-renal azothemia with CKD stage 4 at baseline ( 2.0-2.5)  # Anemia due to acute blood loss anemia  # Sepsis? due to LE ulcers  # Multiple DVT on RUE/RLE with s/p IVC fileter  # HTN    PLAN  - moniter off diuretics ( only inbetween blood transfusion) and fu response to the PRBC transfusion  - continue with current anti HTN  - strick I/O   - abx as per ID, pt given one dose of vanc in ER  family updated at bedside    5/6 MK  - ARYA/CKD stage 4, non oliguric with slow recovery indicative of likely compnent of ATN. Agree with IVF composition to d5w and will moniter with advancing of the diet and uop.  - Severe PVD with ischmic rt foot.  ? cellulitis    abx as per ID.  dw dr dugan and family regarding timing of angiogram and possibiltuy of HD discussed with family. Will moniter the progress over th weekend    5/7 SY  --ARYA/CKD   Creat slowly trending down.. Continue to monitor.  Continue to hold diuretics.  Continue R9H--[id number] slowly improving.  --PVD  Will need decide on timing of angiogram, though this may lead to Dialysis at least temporarily at best.  Continue abtx.

## 2017-05-07 NOTE — PROGRESS NOTE ADULT - ASSESSMENT
82 y/o male with h/o COPD, CHF, chronic renal failure, paroxysmal A-fib.with, hematochezia s/p bleeding scan and flex sigmoidoscopy, anemia, chronic LE stasis dermatitis  was was admitted for increased weakness and wheepy right LE and foot. The patient had a RLE ABDULAZIZ boot for chronic venous stasis ulcers and lower extremity chronic edema that was removed recently.. The daughter reports increased oozing from LE ulcers; she reports thet the right foot ulcers were debrided the day PTA. No fever or chills reported. In Ed patient noted to have Hb 6. He received vancomycin 1 gm IV x 1 and cefepime 1 gm IV x 1.    1. Chronic right LE stasis ulcers. Chronic dermatitis with likely superimposed cellulitis. Severe PVD. ARF. Severe anemia.  -has rectal bleeding - blood transfusion  -f/u BC x 2  -on cefepime 1 gm IV qd # 4  -tolerating abx well so far; no side effects noted   -continue abx coverage  -monitor BMP  -local wound care  -monitor temps  -f/u CBC  -supportive care  2. Other issues: COPD, CHF, chronic renal failure, paroxysmal A-fib.with, hematochezia   -care per medicine

## 2017-05-07 NOTE — PROGRESS NOTE ADULT - ASSESSMENT
perfusion appears improved clinically and renal parameters improving; discussed with daughter that angiogram may be performed tomorrow    will order mucormyst

## 2017-05-07 NOTE — PROGRESS NOTE ADULT - PROBLEM SELECTOR PLAN 6
Likely related to dehydration  Gentle IV fluids as noted above
Likely related to dehydration  Na+ slightly improved on D5W 75cc/hr - will continue
Likely related to dehydration  Gentle IV fluids as noted above

## 2017-05-07 NOTE — PROGRESS NOTE ADULT - SUBJECTIVE AND OBJECTIVE BOX
Patient is a 82y old  Male who presents with a chief complaint of Worsening leg pain, anemia, ARYA sent from Rehab (04 May 2017 15:56)      HPI:  82 year old male with history of CAD s/p stents (LAD, RCA bare metal around 9/16), A.Fib not on anticoagulation due to GI bleeding, Hypertension, COPD on home O2 2L, SHAYY on nocturnal BIPAP, ex-smoker (smoked 1ppd X 50 years, quit 22 years ago),  Chronic diastolic CHF, Hyperlipidemia, PVD, Iron deficiency anemia, Chronic back pain, Gout, BPH, CKD III with baseline Cr 2, recently admitted to  in 4/17 with anemia of GI bleeding, RLE and RUE DVTs, received pRBCs and IVC filter discharged to rehab with aspirin was sent to  ER on 5/4/17 for worsening anemia with Hb 6, worsening leg pain from ulcers and worsening renal function Cr 3.99.    Pt seen bed side.  Not well oriented currently after receiving Dilaudid for pain in ER.  As per daughters, pt was walking with a walker at rehab and was doing better.  Pt was found to have tarry black stools in ER, guaic positive. (04 May 2017 15:56)    Pt comf  NPO  mild dec in HCT and s/p PRBC  neg cp   miold SOB on O2  sat on oxygen 93 and off o2 88%  neg cough        PAST MEDICAL & SURGICAL HISTORY:  Sepsis, due to unspecified organism: 2/2 poorly healing wounds b/l  BPH (benign prostatic hypertrophy)  Hyperlipemia  Coronary artery disease  Hypertension  Dyspepsia: On moderate exertion.  Sleep apnea, obstructive: Requires home 02 therapy, and treatment with BIPAP  Atelectasis  Pleural effusion, bilateral  Respiratory failure  Peripheral edema  CRI (chronic renal insufficiency)  Gout  CRF (chronic renal failure)  Benign prostatic hypertrophy  Spinal stenosis  Hypercholesterolemia  GERD (gastroesophageal reflux disease)  CAD (coronary artery disease)  Hypertension  S/P angioplasty with stent  Cataract of left eye  Prostate: Surgery green light procedure.  S/P rotator cuff surgery: Right  S/P angioplasty  Rotator cuff tear, right: repair      MEDICATIONS  (STANDING):  pantoprazole Infusion 8mG/Hr IV Continuous <Continuous>  buDESOnide   0.5 milliGRAM(s) Respule 0.5milliGRAM(s) Inhalation two times a day  doxazosin 8milliGRAM(s) Oral at bedtime  isosorbide   mononitrate ER Tablet (IMDUR) 120milliGRAM(s) Oral daily  diltiazem   CD 120milliGRAM(s) Oral daily  gabapentin 600milliGRAM(s) Oral at bedtime  gabapentin 300milliGRAM(s) Oral daily  allopurinol 100milliGRAM(s) Oral daily  fenofibrate Tablet 145milliGRAM(s) Oral daily  simvastatin 10milliGRAM(s) Oral at bedtime  pramipexole 0.125milliGRAM(s) Oral three times a day  ALBUTerol    90 MICROgram(s) HFA Inhaler 2Puff(s) Inhalation every 6 hours  ammonium lactate 12% Lotion 1Application(s) Topical two times a day  hydrALAZINE 150milliGRAM(s) Oral two times a day  fluticasone propionate 50 MICROgram(s)/spray Nasal Spray 1Spray(s) Alternating Nostrils two times a day  metoprolol 12.5milliGRAM(s) Oral two times a day  cefepime  IVPB 1000milliGRAM(s) IV Intermittent daily  dextrose 5%. 1000milliLiter(s) IV Continuous <Continuous>  lactobacillus acidophilus 1Tablet(s) Oral daily  acetylcysteine  Oral Solution 1200milliGRAM(s) Oral every 12 hours    MEDICATIONS  (PRN):  traMADol 100milliGRAM(s) Oral every 6 hours PRN Moderate Pain (4 - 6)  aluminum hydroxide/magnesium hydroxide/simethicone Suspension 30milliLiter(s) Oral every 6 hours PRN Dyspepsia  ondansetron Injectable 4milliGRAM(s) IV Push every 6 hours PRN Nausea  oxyCODONE  5 mG/acetaminophen 325 mG 1Tablet(s) Oral every 4 hours PRN Severe Pain (7 - 10)      Allergies    No Known Allergies    Intolerances        SOCIAL HISTORY:unchanged    FAMILY HISTORY:  No pertinent family history in first degree relatives      REVIEW OF SYSTEMS:    CONSTITUTIONAL: No weakness, fevers or chills  EYES/ENT: No visual changes;  No vertigo or throat pain   NECK: No pain or stiffness  RESPIRATORY: No cough, wheezing, hemoptysis; No shortness of breath  CARDIOVASCULAR: No chest pain or palpitations  GENITOURINARY: No dysuria, frequency or hematuria  NEUROLOGICAL: No numbness or weakness  SKIN: No itching, burning, rashes, or lesions   All other review of systems is negative unless indicated above.    Vital Signs Last 24 Hrs  T(C): 37.2, Max: 37.8 (05-07 @ 20:00)  T(F): 98.9, Max: 100.1 (05-07 @ 20:00)  HR: 91 (77 - 95)  BP: 152/62 (123/89 - 163/54)  BP(mean): 142 (56 - 142)  RR: 20 (14 - 22)  SpO2: 97% (91% - 97%)    PHYSICAL EXAM:    Constitutional: NAD, well-developed  HEENT: EOMI, throat clear  Neck: No LAD, supple  Respiratory: CTA and P  Cardiovascular: S1 and S2, RRR, no M  Gastrointestinal: BS+, soft, NT/ND, neg HSM,  Extremities: No peripheral edema, neg clubing, cyanosis  Vascular: 2+ peripheral pulses  Neurological: A/O x 2, no focal deficits  Psychiatric: Normal mood, normal affect  Skin: No rashes    LABS:    5/7 HCT 23.9              RADIOLOGY & ADDITIONAL STUDIES:

## 2017-05-07 NOTE — PROGRESS NOTE ADULT - SUBJECTIVE AND OBJECTIVE BOX
sleeping, Afebrile; Hgb 7.4; Cr 3.42    MEDICATIONS  (STANDING):  pantoprazole Infusion 8mG/Hr IV Continuous <Continuous>  buDESOnide   0.5 milliGRAM(s) Respule 0.5milliGRAM(s) Inhalation two times a day  doxazosin 8milliGRAM(s) Oral at bedtime  isosorbide   mononitrate ER Tablet (IMDUR) 120milliGRAM(s) Oral daily  diltiazem   CD 120milliGRAM(s) Oral daily  gabapentin 600milliGRAM(s) Oral at bedtime  gabapentin 300milliGRAM(s) Oral daily  allopurinol 100milliGRAM(s) Oral daily  fenofibrate Tablet 145milliGRAM(s) Oral daily  simvastatin 10milliGRAM(s) Oral at bedtime  pramipexole 0.125milliGRAM(s) Oral three times a day  ALBUTerol    90 MICROgram(s) HFA Inhaler 2Puff(s) Inhalation every 6 hours  ammonium lactate 12% Lotion 1Application(s) Topical two times a day  hydrALAZINE 150milliGRAM(s) Oral two times a day  fluticasone propionate 50 MICROgram(s)/spray Nasal Spray 1Spray(s) Alternating Nostrils two times a day  metoprolol 12.5milliGRAM(s) Oral two times a day  cefepime  IVPB 1000milliGRAM(s) IV Intermittent daily  dextrose 5%. 1000milliLiter(s) IV Continuous <Continuous>  lactobacillus acidophilus 1Tablet(s) Oral daily    MEDICATIONS  (PRN):  traMADol 100milliGRAM(s) Oral every 6 hours PRN Moderate Pain (4 - 6)  aluminum hydroxide/magnesium hydroxide/simethicone Suspension 30milliLiter(s) Oral every 6 hours PRN Dyspepsia  ondansetron Injectable 4milliGRAM(s) IV Push every 6 hours PRN Nausea  oxyCODONE  5 mG/acetaminophen 325 mG 1Tablet(s) Oral every 4 hours PRN Severe Pain (7 - 10)      Allergies    No Known Allergies    Intolerances        Flatus: [ ] YES [ ] NO             Bowel Movement: [ ] YES [ ] NO  Pain (0-10):            Pain Control Adequate: [ ] YES [ ] NO  Nausea: [ ] YES [ ] NO            Vomiting: [ ] YES [ ] NO  Diarrhea: [ ] YES [ ] NO         Constipation: [ ] YES [ ] NO     Chest Pain: [ ] YES [ ] NO    SOB:  [ ] YES [ ] NO    Vital Signs Last 24 Hrs  T(C): 36.6, Max: 36.7 (05-06 @ 18:00)  T(F): 97.9, Max: 98.1 (05-06 @ 18:00)  HR: 100 (83 - 100)  BP: 148/49 (118/68 - 157/46)  BP(mean): 71 (53 - 78)  RR: 18 (12 - 22)  SpO2: 94% (89% - 100%)    I&O's Summary    I & Os for current day (as of 07 May 2017 10:39)  =============================================  IN: 2838 ml / OUT: 1100 ml / NET: 1738 ml      Physical Exam:  General: NAD, resting comfortably  Pulmonary: normal resp effort, CTA-B  Cardiovascular: NSR  Abdominal: soft, NT/ND  Extremities: WWP, normal strength  Neuro: A/O x 3, CNs II-XII grossly intact, normal motor/sensation, no focal deficits  Pulses:   Right:                                                                          Left:  FEM [ ]2+ [ ]1+ [ ]doppler                                             FEM [ ]2+ [ ]1+ [ ]doppler    POP [ ]2+ [ ]1+ [ ]doppler                                             POP [ ]2+ [ ]1+ [ ]doppler    DP [ ]2+ [ ]1+ [ ]doppler                                                DP [ ]2+ [ ]1+ [ ]doppler  PT[ ]2+ [ ]1+ [ ]doppler                                                  PT [ ]2+ [ ]1+ [ ]doppler    R foot definitely warmer today; cap refill brisk (< 2 s)    LABS:                        7.4    13.7  )-----------( 354      ( 07 May 2017 06:47 )             23.9     05-07    148<H>  |  115<H>  |  136<H>  ----------------------------<  166<H>  4.8   |  22  |  3.42<H>    Ca    8.2<L>      07 May 2017 06:47            CAPILLARY BLOOD GLUCOSE      RADIOLOGY & ADDITIONAL TESTS:

## 2017-05-07 NOTE — PROGRESS NOTE ADULT - SUBJECTIVE AND OBJECTIVE BOX
Patient is a 82y old  Male who presents with a chief complaint of weakness  HPI:  80 y/o male with h/of COPD, CHF, chronic renal failure, paroxysmal A-fib.with, hematochezia s/p bleeding scan and flex sigmoidoscopy, anemia, chronic LE stasis dermatitis  was was admitted for increased weakness and wheepy right LE and foot. The patient had a RLE ABDULAZIZ boot for chronic venous stasis ulcers and lower extremity chronic edema that was removed recently.. The daughter reports increased oozing from LE ulcers; she reports thet the right foot ulcers were debrided the day PTA. No fever or chills reported. In Ed patient noted to have Hb 6. He received vancomycin 1 gm IV x 1 and cefepime 1 gm IV x 1.    Lying in bed in NAD  Weak looking  Denies pain  Has rectal bleeding    MEDICATIONS  (STANDING):  pantoprazole Infusion 8mG/Hr IV Continuous <Continuous>  buDESOnide   0.5 milliGRAM(s) Respule 0.5milliGRAM(s) Inhalation two times a day  doxazosin 8milliGRAM(s) Oral at bedtime  isosorbide   mononitrate ER Tablet (IMDUR) 120milliGRAM(s) Oral daily  diltiazem   CD 120milliGRAM(s) Oral daily  gabapentin 600milliGRAM(s) Oral at bedtime  gabapentin 300milliGRAM(s) Oral daily  allopurinol 100milliGRAM(s) Oral daily  fenofibrate Tablet 145milliGRAM(s) Oral daily  simvastatin 10milliGRAM(s) Oral at bedtime  pramipexole 0.125milliGRAM(s) Oral three times a day  ALBUTerol    90 MICROgram(s) HFA Inhaler 2Puff(s) Inhalation every 6 hours  ammonium lactate 12% Lotion 1Application(s) Topical two times a day  hydrALAZINE 150milliGRAM(s) Oral two times a day  fluticasone propionate 50 MICROgram(s)/spray Nasal Spray 1Spray(s) Alternating Nostrils two times a day  metoprolol 12.5milliGRAM(s) Oral two times a day  cefepime  IVPB 1000milliGRAM(s) IV Intermittent daily  dextrose 5%. 1000milliLiter(s) IV Continuous <Continuous>  lactobacillus acidophilus 1Tablet(s) Oral daily  acetylcysteine  Oral Solution 1200milliGRAM(s) Oral every 12 hours    MEDICATIONS  (PRN):  traMADol 100milliGRAM(s) Oral every 6 hours PRN Moderate Pain (4 - 6)  aluminum hydroxide/magnesium hydroxide/simethicone Suspension 30milliLiter(s) Oral every 6 hours PRN Dyspepsia  ondansetron Injectable 4milliGRAM(s) IV Push every 6 hours PRN Nausea  oxyCODONE  5 mG/acetaminophen 325 mG 1Tablet(s) Oral every 4 hours PRN Severe Pain (7 - 10)      Vital Signs Last 24 Hrs  T(C): 37.3, Max: 37.3 (05-07 @ 13:15)  T(F): 99.2, Max: 99.2 (05-07 @ 13:15)  HR: 88 (83 - 100)  BP: 129/31 (118/68 - 157/46)  BP(mean): 56 (53 - 78)  RR: 20 (12 - 22)  SpO2: 95% (89% - 100%)    Physical Exam:    Constitutional: frail looking  HEENT: NC/AT, EOMI, PERRLA  Neck: supple  Back: no tenderness  Respiratory: few basal rales  Cardiovascular: S1S2 regular, no murmurs  Abdomen: soft, not tender, not distended, positive BS  Genitourinary: deferred  Rectal: deferred  Musculoskeletal: no muscle tenderness, no joint swelling or tenderness  Extremities: b/l chronic skin changes with edema; RLE: dorsal aspect of foot and lower ankle superficial ulcers with serous discharge; surrounding erythema and edema - slightly improved  Neurological: confused, moving all extremities, no focal deficits  Skin: no rashes    Labs:                        7.4    13.7  )-----------( 354      ( 07 May 2017 06:47 )             23.9     05-07    148<H>  |  115<H>  |  136<H>  ----------------------------<  166<H>  4.8   |  22  |  3.42<H>    Ca    8.2<L>      07 May 2017 06:47                 8.3    14.5  )-----------( 370      ( 06 May 2017 04:43 )             26.7     05-06    153<H>  |  121<H>  |  142<H>  ----------------------------<  101<H>  5.0   |  20<L>  |  3.67<H>    Ca    8.2<L>      06 May 2017 04:43  Mg     2.8     05-05    TPro  6.6  /  Alb  2.4<L>  /  TBili  0.2  /  DBili  x   /  AST  20  /  ALT  15  /  AlkPhos  72  05-04               6.8    14.1  )-----------( 375      ( 05 May 2017 05:10 )             20.1     05-05    149<H>  |  118<H>  |  143<H>  ----------------------------<  109<H>  4.9   |  21<L>  |  3.68<H>    Ca    8.3<L>      05 May 2017 05:10  Mg     2.8     05-05    TPro  6.6  /  Alb  2.4<L>  /  TBili  0.2  /  DBili  x   /  AST  20  /  ALT  15  /  AlkPhos  72  05-04               6.3    10.8  )-----------( 398      ( 04 May 2017 12:54 )             21.8     05-04    145  |  112<H>  |  138<H>  ----------------------------<  123<H>  5.5<H>   |  19<L>  |  3.99<H>    Ca    8.8      04 May 2017 12:54    TPro  6.6  /  Alb  2.4<L>  /  TBili  0.2  /  DBili  x   /  AST  20  /  ALT  15  /  AlkPhos  72  05-04     LIVER FUNCTIONS - ( 04 May 2017 12:54 )  Alb: 2.4 g/dL / Pro: 6.6 gm/dL / ALK PHOS: 72 U/L / ALT: 15 U/L / AST: 20 U/L / GGT: x           Culture - Blood (05.04.17 @ 13:21)    Specimen Source: .Blood None    Culture Results:   No growth to date.        Radiology:    Advanced directives addressed: full resuscitation

## 2017-05-07 NOTE — PROGRESS NOTE ADULT - SUBJECTIVE AND OBJECTIVE BOX
NEPHROLOGY INTERVAL HPI/OVERNIGHT EVENTS:     No acute events overnight    Per pt's daughter, pain control now a little improved.    HPI:  82 year old male with history of CAD s/p stents (LAD, RCA bare metal around ), A.Fib not on anticoagulation due to GI bleeding, Hypertension, COPD on home O2 2L, SHAYY on nocturnal BIPAP, ex-smoker (smoked 1ppd X 50 years, quit 22 years ago),  Chronic diastolic CHF, Hyperlipidemia, PVD, Iron deficiency anemia, Chronic back pain, Gout, BPH, CKD III with baseline Cr 2, recently admitted to  in  with anemia of GI bleeding, RLE and RUE DVTs, received pRBCs and IVC filter discharged to rehab with aspirin was sent to  ER on 17 for worsening anemia with Hb 6, worsening leg pain from ulcers and worsening renal function Cr 3.99.    Pt seen bed side.  Not well oriented currently after receiving Dilaudid for pain in ER.  As per daughters, pt was walking with a walker at rehab and was doing better.  Pt was found to have tarry black stools in ER, guaic positive. (04 May 2017 15:56)          MEDICATIONS  (STANDING):  pantoprazole Infusion 8mG/Hr IV Continuous <Continuous>  buDESOnide   0.5 milliGRAM(s) Respule 0.5milliGRAM(s) Inhalation two times a day  doxazosin 8milliGRAM(s) Oral at bedtime  isosorbide   mononitrate ER Tablet (IMDUR) 120milliGRAM(s) Oral daily  diltiazem   CD 120milliGRAM(s) Oral daily  gabapentin 600milliGRAM(s) Oral at bedtime  gabapentin 300milliGRAM(s) Oral daily  allopurinol 100milliGRAM(s) Oral daily  fenofibrate Tablet 145milliGRAM(s) Oral daily  simvastatin 10milliGRAM(s) Oral at bedtime  pramipexole 0.125milliGRAM(s) Oral three times a day  ALBUTerol    90 MICROgram(s) HFA Inhaler 2Puff(s) Inhalation every 6 hours  ammonium lactate 12% Lotion 1Application(s) Topical two times a day  hydrALAZINE 150milliGRAM(s) Oral two times a day  fluticasone propionate 50 MICROgram(s)/spray Nasal Spray 1Spray(s) Alternating Nostrils two times a day  metoprolol 12.5milliGRAM(s) Oral two times a day  cefepime  IVPB 1000milliGRAM(s) IV Intermittent daily  dextrose 5%. 1000milliLiter(s) IV Continuous <Continuous>  lactobacillus acidophilus 1Tablet(s) Oral daily    MEDICATIONS  (PRN):  traMADol 100milliGRAM(s) Oral every 6 hours PRN Moderate Pain (4 - 6)  aluminum hydroxide/magnesium hydroxide/simethicone Suspension 30milliLiter(s) Oral every 6 hours PRN Dyspepsia  ondansetron Injectable 4milliGRAM(s) IV Push every 6 hours PRN Nausea  oxyCODONE  5 mG/acetaminophen 325 mG 1Tablet(s) Oral every 4 hours PRN Severe Pain (7 - 10)          Vital Signs Last 24 Hrs  T(C): 36.6, Max: 36.7 ( @ 18:00)  T(F): 97.9, Max: 98.1 ( @ 18:00)  HR: 100 (83 - 100)  BP: 148/49 (118/68 - 157/46)  BP(mean): 71 (53 - 78)  RR: 18 (12 - 22)  SpO2: 94% (89% - 100%)  Daily     Daily Weight in k.2 (07 May 2017 06:24)    I & Os for current day (as of  @ 09:21)  =============================================  IN: 2838 ml / OUT: 1100 ml / NET: 1738 ml      PHYSICAL EXAM:  Awake and approrpiate  GENERAL: No apparent distress  CHEST/LUNG: Left base rales.  Right clear  HEART: S1S2 RRR  ABDOMEN: soft  EXTREMITIES: bilateral LE dressings.  Edema much improved.  SKIN:     LABS:                        7.4    13.7  )-----------( 354      ( 07 May 2017 06:47 )             23.9         148<H>  |  115<H>  |  136<H>  ----------------------------<  166<H>  4.8   |  22  |  3.42<H>    Ca    8.2<L>      07 May 2017 06:47                  RADIOLOGY & ADDITIONAL TESTS:

## 2017-05-08 LAB
ANION GAP SERPL CALC-SCNC: 10 MMOL/L — SIGNIFICANT CHANGE UP (ref 5–17)
BASOPHILS # BLD AUTO: 0 K/UL — SIGNIFICANT CHANGE UP (ref 0–0.2)
BASOPHILS NFR BLD AUTO: 0.4 % — SIGNIFICANT CHANGE UP (ref 0–2)
BUN SERPL-MCNC: 132 MG/DL — HIGH (ref 7–23)
CALCIUM SERPL-MCNC: 7.9 MG/DL — LOW (ref 8.5–10.1)
CHLORIDE SERPL-SCNC: 111 MMOL/L — HIGH (ref 96–108)
CO2 SERPL-SCNC: 20 MMOL/L — LOW (ref 22–31)
CREAT SERPL-MCNC: 3.06 MG/DL — HIGH (ref 0.5–1.3)
EOSINOPHIL # BLD AUTO: 0.3 K/UL — SIGNIFICANT CHANGE UP (ref 0–0.5)
EOSINOPHIL NFR BLD AUTO: 2.4 % — SIGNIFICANT CHANGE UP (ref 0–6)
GLUCOSE SERPL-MCNC: 118 MG/DL — HIGH (ref 70–99)
HCT VFR BLD CALC: 25.7 % — LOW (ref 39–50)
HCT VFR BLD CALC: 26.3 % — LOW (ref 39–50)
HCT VFR BLD CALC: 26.7 % — LOW (ref 39–50)
HGB BLD-MCNC: 8.3 G/DL — LOW (ref 13–17)
HGB BLD-MCNC: 8.7 G/DL — LOW (ref 13–17)
LYMPHOCYTES # BLD AUTO: 0.4 K/UL — LOW (ref 1–3.3)
LYMPHOCYTES # BLD AUTO: 3.2 % — LOW (ref 13–44)
MCHC RBC-ENTMCNC: 30 PG — SIGNIFICANT CHANGE UP (ref 27–34)
MCHC RBC-ENTMCNC: 32.6 GM/DL — SIGNIFICANT CHANGE UP (ref 32–36)
MCV RBC AUTO: 92.2 FL — SIGNIFICANT CHANGE UP (ref 80–100)
MONOCYTES # BLD AUTO: 0.9 K/UL — SIGNIFICANT CHANGE UP (ref 0–0.9)
MONOCYTES NFR BLD AUTO: 7.1 % — SIGNIFICANT CHANGE UP (ref 2–14)
NEUTROPHILS # BLD AUTO: 10.5 K/UL — HIGH (ref 1.8–7.4)
NEUTROPHILS NFR BLD AUTO: 86.9 % — HIGH (ref 43–77)
PLATELET # BLD AUTO: 303 K/UL — SIGNIFICANT CHANGE UP (ref 150–400)
POTASSIUM SERPL-MCNC: 5.2 MMOL/L — SIGNIFICANT CHANGE UP (ref 3.5–5.3)
POTASSIUM SERPL-SCNC: 5.2 MMOL/L — SIGNIFICANT CHANGE UP (ref 3.5–5.3)
RBC # BLD: 2.9 M/UL — LOW (ref 4.2–5.8)
RBC # FLD: 16.2 % — HIGH (ref 10.3–14.5)
SODIUM SERPL-SCNC: 141 MMOL/L — SIGNIFICANT CHANGE UP (ref 135–145)
WBC # BLD: 12.1 K/UL — HIGH (ref 3.8–10.5)
WBC # FLD AUTO: 12.1 K/UL — HIGH (ref 3.8–10.5)

## 2017-05-08 RX ORDER — SODIUM CHLORIDE 9 MG/ML
1000 INJECTION, SOLUTION INTRAVENOUS
Qty: 0 | Refills: 0 | Status: DISCONTINUED | OUTPATIENT
Start: 2017-05-08 | End: 2017-05-10

## 2017-05-08 RX ADMIN — Medication 1 TABLET(S): at 20:46

## 2017-05-08 RX ADMIN — PRAMIPEXOLE DIHYDROCHLORIDE 0.12 MILLIGRAM(S): 0.12 TABLET ORAL at 05:35

## 2017-05-08 RX ADMIN — GABAPENTIN 300 MILLIGRAM(S): 400 CAPSULE ORAL at 12:15

## 2017-05-08 RX ADMIN — SODIUM CHLORIDE 75 MILLILITER(S): 9 INJECTION, SOLUTION INTRAVENOUS at 05:57

## 2017-05-08 RX ADMIN — SODIUM CHLORIDE 75 MILLILITER(S): 9 INJECTION, SOLUTION INTRAVENOUS at 13:49

## 2017-05-08 RX ADMIN — GABAPENTIN 600 MILLIGRAM(S): 400 CAPSULE ORAL at 21:51

## 2017-05-08 RX ADMIN — CEFEPIME 100 MILLIGRAM(S): 1 INJECTION, POWDER, FOR SOLUTION INTRAMUSCULAR; INTRAVENOUS at 12:11

## 2017-05-08 RX ADMIN — SIMVASTATIN 10 MILLIGRAM(S): 20 TABLET, FILM COATED ORAL at 21:50

## 2017-05-08 RX ADMIN — Medication 150 MILLIGRAM(S): at 05:36

## 2017-05-08 RX ADMIN — Medication 145 MILLIGRAM(S): at 20:45

## 2017-05-08 RX ADMIN — Medication 150 MILLIGRAM(S): at 20:45

## 2017-05-08 RX ADMIN — PRAMIPEXOLE DIHYDROCHLORIDE 0.12 MILLIGRAM(S): 0.12 TABLET ORAL at 21:52

## 2017-05-08 RX ADMIN — PANTOPRAZOLE SODIUM 10 MG/HR: 20 TABLET, DELAYED RELEASE ORAL at 09:19

## 2017-05-08 RX ADMIN — Medication 1 APPLICATION(S): at 05:57

## 2017-05-08 RX ADMIN — Medication 1200 MILLIGRAM(S): at 05:36

## 2017-05-08 RX ADMIN — Medication 1 SPRAY(S): at 20:46

## 2017-05-08 RX ADMIN — ISOSORBIDE MONONITRATE 120 MILLIGRAM(S): 60 TABLET, EXTENDED RELEASE ORAL at 20:44

## 2017-05-08 RX ADMIN — Medication 1 SPRAY(S): at 05:56

## 2017-05-08 RX ADMIN — ALBUTEROL 2 PUFF(S): 90 AEROSOL, METERED ORAL at 19:49

## 2017-05-08 RX ADMIN — Medication 8 MILLIGRAM(S): at 21:50

## 2017-05-08 RX ADMIN — Medication 0.5 MILLIGRAM(S): at 09:02

## 2017-05-08 RX ADMIN — Medication 12.5 MILLIGRAM(S): at 05:35

## 2017-05-08 RX ADMIN — Medication 1200 MILLIGRAM(S): at 20:46

## 2017-05-08 RX ADMIN — ALBUTEROL 2 PUFF(S): 90 AEROSOL, METERED ORAL at 09:02

## 2017-05-08 RX ADMIN — Medication 12.5 MILLIGRAM(S): at 20:44

## 2017-05-08 RX ADMIN — Medication 100 MILLIGRAM(S): at 20:45

## 2017-05-08 RX ADMIN — Medication 120 MILLIGRAM(S): at 20:45

## 2017-05-08 RX ADMIN — Medication 0.5 MILLIGRAM(S): at 19:49

## 2017-05-08 NOTE — PROGRESS NOTE ADULT - ASSESSMENT
anemia, likely multifactorial  I suspect that there is a component of GI bleeding, appears stabilized on PPI gtt and hct stable  He has received 5 units of packed red blood cells and getting 5th one now    will delay EGD due to low o2 sat, DW family again        pt now more alert and awake, but is variable  per D hallucinates at times      I had an extensive discussion with the patient's daughter. Secondary to his multiple comorbid disorders, at this time, we will delay endoscopic evaluation. It is likely that he would require intubation and may have respiratory compromise.  He likely also septic.  We'll continue Protonix drip    Serial H&H's every 8 hours.              Lower extremity DVT, status post IVC filter    Sepsis likely secondary to lower extremity cellulitis and antibiotics will be continued.  Infectious disease note was appreciated.    Advanced COPD and obstructive sleep apnea reviewed.

## 2017-05-08 NOTE — PROGRESS NOTE ADULT - SUBJECTIVE AND OBJECTIVE BOX
Post-procedure check.  S/P angiogram/angioplasty with stent placement via bilateral femoral approach  Pt sitting up in bed without complaints. Family at bedside.  VSS  Bilateral groins with dressing clean, dry and intact.  No ecchymosis or hematoma  Bilateral toes are pink and warm.  Continue present care.

## 2017-05-08 NOTE — PROGRESS NOTE ADULT - SUBJECTIVE AND OBJECTIVE BOX
Patient is a 82y old  Male who presents with a chief complaint of weakness  HPI:  80 y/o male with h/of COPD, CHF, chronic renal failure, paroxysmal A-fib.with, hematochezia s/p bleeding scan and flex sigmoidoscopy, anemia, chronic LE stasis dermatitis  was was admitted for increased weakness and wheepy right LE and foot. The patient had a RLE ABDULAZIZ boot for chronic venous stasis ulcers and lower extremity chronic edema that was removed recently.. The daughter reports increased oozing from LE ulcers; she reports thet the right foot ulcers were debrided the day PTA. No fever or chills reported. In Ed patient noted to have Hb 6. He received vancomycin 1 gm IV x 1 and cefepime 1 gm IV x 1.    Lying in bed in NAD  Weak looking  Denies pain  Has rectal bleeding on and off    MEDICATIONS  (STANDING):  pantoprazole Infusion 8mG/Hr IV Continuous <Continuous>  buDESOnide   0.5 milliGRAM(s) Respule 0.5milliGRAM(s) Inhalation two times a day  doxazosin 8milliGRAM(s) Oral at bedtime  isosorbide   mononitrate ER Tablet (IMDUR) 120milliGRAM(s) Oral daily  diltiazem   CD 120milliGRAM(s) Oral daily  gabapentin 600milliGRAM(s) Oral at bedtime  gabapentin 300milliGRAM(s) Oral daily  allopurinol 100milliGRAM(s) Oral daily  fenofibrate Tablet 145milliGRAM(s) Oral daily  simvastatin 10milliGRAM(s) Oral at bedtime  pramipexole 0.125milliGRAM(s) Oral three times a day  ALBUTerol    90 MICROgram(s) HFA Inhaler 2Puff(s) Inhalation every 6 hours  ammonium lactate 12% Lotion 1Application(s) Topical two times a day  hydrALAZINE 150milliGRAM(s) Oral two times a day  fluticasone propionate 50 MICROgram(s)/spray Nasal Spray 1Spray(s) Alternating Nostrils two times a day  metoprolol 12.5milliGRAM(s) Oral two times a day  cefepime  IVPB 1000milliGRAM(s) IV Intermittent daily  lactobacillus acidophilus 1Tablet(s) Oral daily  acetylcysteine  Oral Solution 1200milliGRAM(s) Oral every 12 hours  sodium chloride 0.45%. 1000milliLiter(s) IV Continuous <Continuous>    MEDICATIONS  (PRN):  traMADol 100milliGRAM(s) Oral every 6 hours PRN Moderate Pain (4 - 6)  aluminum hydroxide/magnesium hydroxide/simethicone Suspension 30milliLiter(s) Oral every 6 hours PRN Dyspepsia  ondansetron Injectable 4milliGRAM(s) IV Push every 6 hours PRN Nausea  oxyCODONE  5 mG/acetaminophen 325 mG 1Tablet(s) Oral every 4 hours PRN Severe Pain (7 - 10)      Vital Signs Last 24 Hrs  T(C): 37.2, Max: 37.8 (05-07 @ 20:00)  T(F): 99, Max: 100.1 (05-07 @ 20:00)  HR: 85 (76 - 95)  BP: 150/30 (132/43 - 163/54)  BP(mean): 60 (59 - 142)  RR: 18 (14 - 31)  SpO2: 95% (91% - 99%)    Physical Exam:    Constitutional: frail looking  HEENT: NC/AT, EOMI, PERRLA  Neck: supple  Back: no tenderness  Respiratory: few basal rales  Cardiovascular: S1S2 regular, no murmurs  Abdomen: soft, not tender, not distended, positive BS  Genitourinary: deferred  Rectal: deferred  Musculoskeletal: no muscle tenderness, no joint swelling or tenderness  Extremities: b/l chronic skin changes with edema; RLE: dorsal aspect of foot and lower ankle superficial ulcers with serous discharge; surrounding erythema and edema - slightly improved  Neurological: confused, moving all extremities, no focal deficits  Skin: no rashes    Labs:                        7.4    13.7  )-----------( 354      ( 07 May 2017 06:47 )             23.9     05-07    148<H>  |  115<H>  |  136<H>  ----------------------------<  166<H>  4.8   |  22  |  3.42<H>    Ca    8.2<L>      07 May 2017 06:47                 8.3    14.5  )-----------( 370      ( 06 May 2017 04:43 )             26.7     05-06    153<H>  |  121<H>  |  142<H>  ----------------------------<  101<H>  5.0   |  20<L>  |  3.67<H>    Ca    8.2<L>      06 May 2017 04:43  Mg     2.8     05-05    TPro  6.6  /  Alb  2.4<L>  /  TBili  0.2  /  DBili  x   /  AST  20  /  ALT  15  /  AlkPhos  72  05-04               6.8    14.1  )-----------( 375      ( 05 May 2017 05:10 )             20.1     05-05    149<H>  |  118<H>  |  143<H>  ----------------------------<  109<H>  4.9   |  21<L>  |  3.68<H>    Ca    8.3<L>      05 May 2017 05:10  Mg     2.8     05-05    TPro  6.6  /  Alb  2.4<L>  /  TBili  0.2  /  DBili  x   /  AST  20  /  ALT  15  /  AlkPhos  72  05-04               6.3    10.8  )-----------( 398      ( 04 May 2017 12:54 )             21.8     05-04    145  |  112<H>  |  138<H>  ----------------------------<  123<H>  5.5<H>   |  19<L>  |  3.99<H>    Ca    8.8      04 May 2017 12:54    TPro  6.6  /  Alb  2.4<L>  /  TBili  0.2  /  DBili  x   /  AST  20  /  ALT  15  /  AlkPhos  72  05-04     LIVER FUNCTIONS - ( 04 May 2017 12:54 )  Alb: 2.4 g/dL / Pro: 6.6 gm/dL / ALK PHOS: 72 U/L / ALT: 15 U/L / AST: 20 U/L / GGT: x           Culture - Blood (05.04.17 @ 13:21)    Specimen Source: .Blood None    Culture Results:   No growth to date.        Radiology:    Advanced directives addressed: full resuscitation

## 2017-05-08 NOTE — PROGRESS NOTE ADULT - SUBJECTIVE AND OBJECTIVE BOX
TD  05/08:  CATH LAB.  confused.    MEDICATIONS  (STANDING):  pantoprazole Infusion 8mG/Hr IV Continuous <Continuous>  buDESOnide   0.5 milliGRAM(s) Respule 0.5milliGRAM(s) Inhalation two times a day  doxazosin 8milliGRAM(s) Oral at bedtime  isosorbide   mononitrate ER Tablet (IMDUR) 120milliGRAM(s) Oral daily  diltiazem   CD 120milliGRAM(s) Oral daily  gabapentin 600milliGRAM(s) Oral at bedtime  gabapentin 300milliGRAM(s) Oral daily  allopurinol 100milliGRAM(s) Oral daily  fenofibrate Tablet 145milliGRAM(s) Oral daily  simvastatin 10milliGRAM(s) Oral at bedtime  pramipexole 0.125milliGRAM(s) Oral three times a day  ALBUTerol    90 MICROgram(s) HFA Inhaler 2Puff(s) Inhalation every 6 hours  ammonium lactate 12% Lotion 1Application(s) Topical two times a day  hydrALAZINE 150milliGRAM(s) Oral two times a day  fluticasone propionate 50 MICROgram(s)/spray Nasal Spray 1Spray(s) Alternating Nostrils two times a day  metoprolol 12.5milliGRAM(s) Oral two times a day  cefepime  IVPB 1000milliGRAM(s) IV Intermittent daily  lactobacillus acidophilus 1Tablet(s) Oral daily  acetylcysteine  Oral Solution 1200milliGRAM(s) Oral every 12 hours  sodium chloride 0.45%. 1000milliLiter(s) IV Continuous <Continuous>    MEDICATIONS  (PRN):  traMADol 100milliGRAM(s) Oral every 6 hours PRN Moderate Pain (4 - 6)  aluminum hydroxide/magnesium hydroxide/simethicone Suspension 30milliLiter(s) Oral every 6 hours PRN Dyspepsia  ondansetron Injectable 4milliGRAM(s) IV Push every 6 hours PRN Nausea  oxyCODONE  5 mG/acetaminophen 325 mG 1Tablet(s) Oral every 4 hours PRN Severe Pain (7 - 10)    Vital Signs Last 24 Hrs  T(C): 37.2, Max: 37.8 (05-07 @ 20:00)  T(F): 98.9, Max: 100.1 (05-07 @ 20:00)  HR: 100 (76 - 100)  BP: 157/62 (125/89 - 163/54)  BP(mean): 75 (59 - 142)  RR: 18 (14 - 31)  SpO2: 94% (91% - 99%)    no acute distress.  no wheezes.  regular.  soft.  NTND.  (+) Feldman.  (+) right leg dressing.                        x      x     )-----------( x        ( 08 May 2017 13:06 )             25.7       05-08    141  |  111<H>  |  132<H>  ----------------------------<  118<H>  5.2   |  20<L>  |  3.06<H>    Ca    7.9<L>      08 May 2017 04:57

## 2017-05-08 NOTE — PROGRESS NOTE ADULT - ASSESSMENT
80 y/o male with h/o COPD, CHF, chronic renal failure, paroxysmal A-fib.with, hematochezia s/p bleeding scan and flex sigmoidoscopy, anemia, chronic LE stasis dermatitis  was was admitted for increased weakness and wheepy right LE and foot. The patient had a RLE ABDULAZIZ boot for chronic venous stasis ulcers and lower extremity chronic edema that was removed recently.. The daughter reports increased oozing from LE ulcers; she reports thet the right foot ulcers were debrided the day PTA. No fever or chills reported. In Ed patient noted to have Hb 6. He received vancomycin 1 gm IV x 1 and cefepime 1 gm IV x 1.    1. Chronic right LE stasis ulcers. Chronic dermatitis with likely superimposed cellulitis. Severe PVD. ARF. Severe anemia.  -has rectal bleeding - blood transfusion  -f/u BC x 2  -on cefepime 1 gm IV qd # 5  -tolerating abx well so far; no side effects noted   -for LE angiogram  -continue abx coverage  -monitor BMP  -local wound care  -monitor temps  -f/u CBC  -supportive care  2. Other issues: COPD, CHF, chronic renal failure, paroxysmal A-fib.with, hematochezia   -care per medicine

## 2017-05-08 NOTE — PROGRESS NOTE ADULT - PROBLEM SELECTOR PLAN 1
-c/w IV ABx.  - Vascular consult appreciated.  Severe PVD Ischemic Rt foot and ulcers.  -ID following.

## 2017-05-08 NOTE — PROGRESS NOTE ADULT - SUBJECTIVE AND OBJECTIVE BOX
Patient is a 82y old  Male who presents with a chief complaint of Worsening leg pain, anemia, ARYA sent from Rehab.      HPI:  82 year old male with history of CAD s/p stents (LAD, RCA bare metal around ), A.Fib not on anticoagulation due to GI bleeding, Hypertension, COPD on home O2 2L, SHAYY on nocturnal BIPAP, ex-smoker (smoked 1ppd X 50 years, quit 22 years ago),  Chronic diastolic CHF, Hyperlipidemia, PVD, Iron deficiency anemia, Chronic back pain, Gout, BPH, CKD III with baseline Cr 2, recently admitted to  in  with anemia of GI bleeding, RLE and RUE DVTs, received pRBCs and IVC filter discharged to rehab with aspirin was sent to  ER on 17 for worsening anemia with Hb 6, worsening leg pain from ulcers and worsening renal function Cr 3.99.    - Pt this am appears confused but more alert this am compared to last Friday am.  He denies any CP or SOB.       PAST MEDICAL & SURGICAL HISTORY:  Sepsis, due to unspecified organism: 2/2 poorly healing wounds b/l  BPH (benign prostatic hypertrophy)  Hyperlipemia  Coronary artery disease  Hypertension  Dyspepsia: On moderate exertion.  Sleep apnea, obstructive: Requires home 02 therapy, and treatment with BIPAP  Atelectasis  Pleural effusion, bilateral  Respiratory failure  Peripheral edema  CRI (chronic renal insufficiency)  Gout  CRF (chronic renal failure)  Benign prostatic hypertrophy  Spinal stenosis  Hypercholesterolemia  GERD (gastroesophageal reflux disease)  CAD (coronary artery disease)  Hypertension  S/P angioplasty with stent  Cataract of left eye  Prostate: Surgery green light procedure.  S/P rotator cuff surgery: Right  S/P angioplasty  Rotator cuff tear, right: repair      MEDICATIONS  (STANDING):  pantoprazole Infusion 8mG/Hr IV Continuous <Continuous>  buDESOnide   0.5 milliGRAM(s) Respule 0.5milliGRAM(s) Inhalation two times a day  doxazosin 8milliGRAM(s) Oral at bedtime  isosorbide   mononitrate ER Tablet (IMDUR) 120milliGRAM(s) Oral daily  diltiazem   CD 120milliGRAM(s) Oral daily  gabapentin 600milliGRAM(s) Oral at bedtime  gabapentin 300milliGRAM(s) Oral daily  allopurinol 100milliGRAM(s) Oral daily  fenofibrate Tablet 145milliGRAM(s) Oral daily  simvastatin 10milliGRAM(s) Oral at bedtime  pramipexole 0.125milliGRAM(s) Oral three times a day  ALBUTerol    90 MICROgram(s) HFA Inhaler 2Puff(s) Inhalation every 6 hours  ammonium lactate 12% Lotion 1Application(s) Topical two times a day  hydrALAZINE 150milliGRAM(s) Oral two times a day  fluticasone propionate 50 MICROgram(s)/spray Nasal Spray 1Spray(s) Alternating Nostrils two times a day  metoprolol 12.5milliGRAM(s) Oral two times a day  cefepime  IVPB 1000milliGRAM(s) IV Intermittent daily    MEDICATIONS  (PRN):  acetaminophen   Tablet 650milliGRAM(s) Oral every 6 hours PRN For Temp greater than 38 C (100.4 F)  traMADol 100milliGRAM(s) Oral every 6 hours PRN Moderate Pain (4 - 6)  aluminum hydroxide/magnesium hydroxide/simethicone Suspension 30milliLiter(s) Oral every 6 hours PRN Dyspepsia  ondansetron Injectable 4milliGRAM(s) IV Push every 6 hours PRN Nausea      FAMILY HISTORY:  No pertinent family history in first degree relatives      SOCIAL HISTORY:  non smoker, no alcohol use      REVIEW OF SYSTEMS:  Unable to obtain as pt is confused        Vital Signs Last 24 Hrs  T(C): 37.9, Max: 38.2 (05-04 @ 23:00)  T(F): 100.3, Max: 100.8 (05-04 @ 23:00)  HR: 97 (89 - 115)  BP: 122/36 (106/37 - 156/41)  BP(mean): 59 (47 - 72)  RR: 19 (15 - 25)  SpO2: 94% (89% - 100%)    PHYSICAL EXAM-    Constitutional: ill looking pt with confusion.  Head: Head is normocephalic and atraumatic.      Neck: The patient's neck is supple without enlargement, has no palpable thyromegaly nor thyroid nodules and has no jugular venous distention. No audible carotid bruits. There are strong carotid pulses bilaterally. No JVD.     Cardiovascular: Regular rate and rhythm without S3, S4. No murmurs or rubs are appreciated.      Respiratory: Breath sounds are normal. No rales. No wheezing.    Abdomen: Soft, nontender, nondistended with positive bowel sounds.      Extremity: both extremities are wrapped currently foul smelling .    Neurologic: confused     Skin: No rash, no obvious lesions noted.      Psychiatric: confused      INTERPRETATION OF TELEMETRY: sinus tachycardia    ECG:sinus tachycardia with normal axis, ST depression in V5-7.    I&O's Detail    I & Os for current day (as of 05 May 2017 08:04)  =============================================  IN:    Packed Red Blood Cells: 647 ml    Total IN: 647 ml  ---------------------------------------------  OUT:    Voided: 400 ml    Total OUT: 400 ml  ---------------------------------------------  Total NET: 247 ml      LABS:                        6.8    14.1  )-----------( 375      ( 05 May 2017 05:10 )             20.1     05-    149<H>  |  118<H>  |  143<H>  ----------------------------<  109<H>  4.9   |  21<L>  |  3.68<H>    Ca    8.3<L>      05 May 2017 05:10  Mg     2.8     05-    TPro  6.6  /  Alb  2.4<L>  /  TBili  0.2  /  DBili  x   /  AST  20  /  ALT  15  /  AlkPhos  72  05-    CARDIAC MARKERS ( 04 May 2017 12:54 )  <0.015 ng/mL / x     / 99 U/L / x     / x          PT/INR - ( 04 May 2017 12:54 )   PT: 13.6 sec;   INR: 1.25 ratio         PTT - ( 04 May 2017 12:54 )  PTT:38.8 sec  Urinalysis Basic - ( 04 May 2017 18:00 )    Color: Yellow / Appearance: Clear / S.020 / pH: x  Gluc: x / Ketone: Negative  / Bili: Small / Urobili: Negative mg/dL   Blood: x / Protein: 15 mg/dL / Nitrite: Negative   Leuk Esterase: Trace / RBC: 0-2 /HPF / WBC 0-2   Sq Epi: x / Non Sq Epi: Occasional / Bacteria: Occasional      I&O's Summary    I & Os for current day (as of 05 May 2017 08:04)  =============================================  IN: 647 ml / OUT: 400 ml / NET: 247 ml    BNP  RADIOLOGY & ADDITIONAL STUDIES:

## 2017-05-08 NOTE — PROGRESS NOTE ADULT - SUBJECTIVE AND OBJECTIVE BOX
Patient is a 82y old  Male who presents with a chief complaint of Worsening leg pain, anemia, ARYA sent from Rehab (04 May 2017 15:56)      HPI:  82 year old male with history of CAD s/p stents (LAD, RCA bare metal around 9/16), A.Fib not on anticoagulation due to GI bleeding, Hypertension, COPD on home O2 2L, SHAYY on nocturnal BIPAP, ex-smoker (smoked 1ppd X 50 years, quit 22 years ago),  Chronic diastolic CHF, Hyperlipidemia, PVD, Iron deficiency anemia, Chronic back pain, Gout, BPH, CKD III with baseline Cr 2, recently admitted to  in 4/17 with anemia of GI bleeding, RLE and RUE DVTs, received pRBCs and IVC filter discharged to rehab with aspirin was sent to  ER on 5/4/17 for worsening anemia with Hb 6, worsening leg pain from ulcers and worsening renal function Cr 3.99.    Pt seen bed side.  Not well oriented currently after receiving Dilaudid for pain in ER.  As per daughters, pt was walking with a walker at rehab and was doing better.  Pt was found to have tarry black stools in ER, guaic positive. (04 May 2017 15:56)    CO mild upper abd pain that inc with movement  also has perianal itch and burning  neg BM today  neg cp  mild SOB and fatigue  neg cough  CO mild RLE pain that inc with movementn    Hx per pt and daughters      PAST MEDICAL & SURGICAL HISTORY:  Sepsis, due to unspecified organism: 2/2 poorly healing wounds b/l  BPH (benign prostatic hypertrophy)  Hyperlipemia  Coronary artery disease  Hypertension  Dyspepsia: On moderate exertion.  Sleep apnea, obstructive: Requires home 02 therapy, and treatment with BIPAP  Atelectasis  Pleural effusion, bilateral  Respiratory failure  Peripheral edema  CRI (chronic renal insufficiency)  Gout  CRF (chronic renal failure)  Benign prostatic hypertrophy  Spinal stenosis  Hypercholesterolemia  GERD (gastroesophageal reflux disease)  CAD (coronary artery disease)  Hypertension  S/P angioplasty with stent  Cataract of left eye  Prostate: Surgery green light procedure.  S/P rotator cuff surgery: Right  S/P angioplasty  Rotator cuff tear, right: repair      MEDICATIONS  (STANDING):  pantoprazole Infusion 8mG/Hr IV Continuous <Continuous>  buDESOnide   0.5 milliGRAM(s) Respule 0.5milliGRAM(s) Inhalation two times a day  doxazosin 8milliGRAM(s) Oral at bedtime  isosorbide   mononitrate ER Tablet (IMDUR) 120milliGRAM(s) Oral daily  diltiazem   CD 120milliGRAM(s) Oral daily  gabapentin 600milliGRAM(s) Oral at bedtime  gabapentin 300milliGRAM(s) Oral daily  allopurinol 100milliGRAM(s) Oral daily  fenofibrate Tablet 145milliGRAM(s) Oral daily  simvastatin 10milliGRAM(s) Oral at bedtime  pramipexole 0.125milliGRAM(s) Oral three times a day  ALBUTerol    90 MICROgram(s) HFA Inhaler 2Puff(s) Inhalation every 6 hours  ammonium lactate 12% Lotion 1Application(s) Topical two times a day  hydrALAZINE 150milliGRAM(s) Oral two times a day  fluticasone propionate 50 MICROgram(s)/spray Nasal Spray 1Spray(s) Alternating Nostrils two times a day  metoprolol 12.5milliGRAM(s) Oral two times a day  cefepime  IVPB 1000milliGRAM(s) IV Intermittent daily  dextrose 5%. 1000milliLiter(s) IV Continuous <Continuous>  lactobacillus acidophilus 1Tablet(s) Oral daily  acetylcysteine  Oral Solution 1200milliGRAM(s) Oral every 12 hours    MEDICATIONS  (PRN):  traMADol 100milliGRAM(s) Oral every 6 hours PRN Moderate Pain (4 - 6)  aluminum hydroxide/magnesium hydroxide/simethicone Suspension 30milliLiter(s) Oral every 6 hours PRN Dyspepsia  ondansetron Injectable 4milliGRAM(s) IV Push every 6 hours PRN Nausea  oxyCODONE  5 mG/acetaminophen 325 mG 1Tablet(s) Oral every 4 hours PRN Severe Pain (7 - 10)      Allergies    No Known Allergies    Intolerances        SOCIAL HISTORY:unchanged    FAMILY HISTORY:  No pertinent family history in first degree relatives      REVIEW OF SYSTEMS: but not reliable    CONSTITUTIONAL: No weakness, fevers or chills  EYES/ENT: No visual changes;  No vertigo or throat pain   NECK: No pain or stiffness  RESPIRATORY: No cough, wheezing, hemoptysis; No shortness of breath  CARDIOVASCULAR: No chest pain or palpitations  GENITOURINARY: No dysuria, frequency or hematuria  NEUROLOGICAL: No numbness or weakness  SKIN: No itching, burning, rashes, or lesions   All other review of systems is negative unless indicated above.    Vital Signs Last 24 Hrs  T(C): 37.2, Max: 37.8 (05-07 @ 20:00)  T(F): 98.9, Max: 100.1 (05-07 @ 20:00)  HR: 91 (77 - 95)  BP: 152/62 (123/89 - 163/54)  BP(mean): 142 (56 - 142)  RR: 20 (14 - 22)  SpO2: 97% (91% - 97%)    PHYSICAL EXAM:    Constitutional: NAD, well-developed  HEENT: EOMI, throat clear  Neck: No LAD, supple  Respiratory: CTA and P  Cardiovascular: S1 and S2, RRR, no M  Gastrointestinal: BS+, soft, mild epig tend/ND, neg HSM,  Extremities: pos peripheral edema, neg clubing, LE venous stasis and ulcers on RLE    Neurological: A/O x 2, no focal deficits  Psychiatric: Normal mood, normal affect  Skin: No rashes    LABS:  CBC Full  -  ( 08 May 2017 04:57 )  WBC Count : 12.1 K/uL  Hemoglobin : 8.7 g/dL  Hematocrit : 26.7 %  Platelet Count - Automated : 303 K/uL  Mean Cell Volume : 92.2 fl  Mean Cell Hemoglobin : 30.0 pg  Mean Cell Hemoglobin Concentration : 32.6 gm/dL  Auto Neutrophil # : 10.5 K/uL  Auto Lymphocyte # : 0.4 K/uL  Auto Monocyte # : 0.9 K/uL  Auto Eosinophil # : 0.3 K/uL  Auto Basophil # : 0.0 K/uL  Auto Neutrophil % : 86.9 %  Auto Lymphocyte % : 3.2 %  Auto Monocyte % : 7.1 %  Auto Eosinophil % : 2.4 %  Auto Basophil % : 0.4 %    05-08    141  |  111<H>  |  132<H>  ----------------------------<  118<H>  5.2   |  20<L>  |  3.06<H>    Ca    7.9<L>      08 May 2017 04:57              RADIOLOGY & ADDITIONAL STUDIES:

## 2017-05-08 NOTE — PROGRESS NOTE ADULT - ASSESSMENT
# ARYA due to pre-renal azothemia with CKD stage 4 at baseline ( 2.0-2.5)  # Anemia due to acute blood loss anemia  # Sepsis? due to LE ulcers  # Multiple DVT on RUE/RLE with s/p IVC fileter  # HTN    PLAN  - moniter off diuretics ( only inbetween blood transfusion) and fu response to the PRBC transfusion  - continue with current anti HTN  - strick I/O   - abx as per ID, pt given one dose of vanc in ER  family updated at bedside    5/6 MK  - ARYA/CKD stage 4, non oliguric with slow recovery indicative of likely compnent of ATN. Agree with IVF composition to d5w and will moniter with advancing of the diet and uop.  - Severe PVD with ischmic rt foot.  ? cellulitis    abx as per ID.  jerald dugan and family regarding timing of angiogram and possibiltuy of HD discussed with family. Will moniter the progress over th weekend    5/7 SY  --ARYA/CKD   Creat slowly trending down.. Continue to monitor.  Continue to hold diuretics.  Continue Y1V--Lhllxpnubnnne slowly improving.  --PVD  Will need decide on timing of angiogram, though this may lead to Dialysis at least temporarily at best.  Continue abtx.    5/7 MK  - ARYA/ CKD stage 4, improving....   likely pre-renal   - PVD, planned for LE angiogram today with dr dugan, mucomyst started. continue with ivf, will change to 1/2 ns.  jerald pascual at bedside, again aware of the risk for HD

## 2017-05-08 NOTE — PROGRESS NOTE ADULT - ASSESSMENT
GI bleed-Anemia- GI team evaluaing pt , EGD on hold.  S/p PRBC multiple unit transfusion.    CAD-    From cardiology standpoint he had longstanding history of coronary artery disease. He underwent percutaneous coronary intervention and received a stent in the left anterior descending artery in 2002 and most recently, on September 28, 2016, he underwent rotational atherectomy and received an Integrity bare metal stent in the proximal and mid RCA.   Will continue oupt meds for CAD as tolerated.    Renal failure, azotemia-nephrology team evaluating pt.    Hyperlipiemia- continue statin    PVD- Plan for peripheral angiogram today per vascular surgery.    Thank you for allowing me to participate in the care of this patient. Please feel free to contact me with any questions.

## 2017-05-08 NOTE — PROGRESS NOTE ADULT - SUBJECTIVE AND OBJECTIVE BOX
NEPHROLOGY INTERVAL HPI/OVERNIGHT EVENTS:  appears comfortable, pain continues..       MEDICATIONS  (STANDING):  pantoprazole Infusion 8mG/Hr IV Continuous <Continuous>  buDESOnide   0.5 milliGRAM(s) Respule 0.5milliGRAM(s) Inhalation two times a day  doxazosin 8milliGRAM(s) Oral at bedtime  isosorbide   mononitrate ER Tablet (IMDUR) 120milliGRAM(s) Oral daily  diltiazem   CD 120milliGRAM(s) Oral daily  gabapentin 600milliGRAM(s) Oral at bedtime  gabapentin 300milliGRAM(s) Oral daily  allopurinol 100milliGRAM(s) Oral daily  fenofibrate Tablet 145milliGRAM(s) Oral daily  simvastatin 10milliGRAM(s) Oral at bedtime  pramipexole 0.125milliGRAM(s) Oral three times a day  ALBUTerol    90 MICROgram(s) HFA Inhaler 2Puff(s) Inhalation every 6 hours  ammonium lactate 12% Lotion 1Application(s) Topical two times a day  hydrALAZINE 150milliGRAM(s) Oral two times a day  fluticasone propionate 50 MICROgram(s)/spray Nasal Spray 1Spray(s) Alternating Nostrils two times a day  metoprolol 12.5milliGRAM(s) Oral two times a day  cefepime  IVPB 1000milliGRAM(s) IV Intermittent daily  dextrose 5%. 1000milliLiter(s) IV Continuous <Continuous>  lactobacillus acidophilus 1Tablet(s) Oral daily  acetylcysteine  Oral Solution 1200milliGRAM(s) Oral every 12 hours    MEDICATIONS  (PRN):  traMADol 100milliGRAM(s) Oral every 6 hours PRN Moderate Pain (4 - 6)  aluminum hydroxide/magnesium hydroxide/simethicone Suspension 30milliLiter(s) Oral every 6 hours PRN Dyspepsia  ondansetron Injectable 4milliGRAM(s) IV Push every 6 hours PRN Nausea  oxyCODONE  5 mG/acetaminophen 325 mG 1Tablet(s) Oral every 4 hours PRN Severe Pain (7 - 10)      Allergies    No Known Allergies    Intolerances        I&O's Detail  I & Os for 24h ending 08 May 2017 07:00  =============================================  IN:    dextrose 5%.: 1455 ml    Packed Red Blood Cells: 305 ml    pantoprazole Infusion: 197 ml    IV PiggyBack: 50 ml    Total IN: 2007 ml  ---------------------------------------------  OUT:    Ureteral Catheter: 1200 ml    Total OUT: 1200 ml  ---------------------------------------------  Total NET: 807 ml    I & Os for current day (as of 08 May 2017 12:05)  =============================================  IN:    dextrose 5%.: 240 ml    pantoprazole Infusion: 23 ml    Total IN: 263 ml  ---------------------------------------------  OUT:    Total OUT: 0 ml  ---------------------------------------------  Total NET: 263 ml      Vital Signs Last 24 Hrs  T(C): 37.2, Max: 37.8 ( @ 20:00)  T(F): 99, Max: 100.1 ( @ 20:00)  HR: 85 (76 - 95)  BP: 150/30 (123/89 - 163/54)  BP(mean): 60 (56 - 142)  RR: 18 (14 - 31)  SpO2: 95% (91% - 99%)  Daily     Daily Weight in k.5 (08 May 2017 05:53)    PHYSICAL EXAM:  General: alert. awake, resting comfortably  HEENT: MMM  CV: s1s2 rrr  LUNGS: B/L CTA   EXT: LE dressing in place     LABS:                        8.7    12.1  )-----------( 303      ( 08 May 2017 04:57 )             26.7     05-08    141  |  111<H>  |  132<H>  ----------------------------<  118<H>  5.2   |  20<L>  |  3.06<H>    Ca    7.9<L>      08 May 2017 04:57

## 2017-05-09 LAB
ANION GAP SERPL CALC-SCNC: 11 MMOL/L — SIGNIFICANT CHANGE UP (ref 5–17)
BUN SERPL-MCNC: 118 MG/DL — HIGH (ref 7–23)
CALCIUM SERPL-MCNC: 7.9 MG/DL — LOW (ref 8.5–10.1)
CHLORIDE SERPL-SCNC: 111 MMOL/L — HIGH (ref 96–108)
CO2 SERPL-SCNC: 19 MMOL/L — LOW (ref 22–31)
CREAT SERPL-MCNC: 2.58 MG/DL — HIGH (ref 0.5–1.3)
CULTURE RESULTS: SIGNIFICANT CHANGE UP
CULTURE RESULTS: SIGNIFICANT CHANGE UP
GLUCOSE SERPL-MCNC: 90 MG/DL — SIGNIFICANT CHANGE UP (ref 70–99)
HCT VFR BLD CALC: 25 % — LOW (ref 39–50)
HGB BLD-MCNC: 8.3 G/DL — LOW (ref 13–17)
MCHC RBC-ENTMCNC: 30.2 PG — SIGNIFICANT CHANGE UP (ref 27–34)
MCHC RBC-ENTMCNC: 33.3 GM/DL — SIGNIFICANT CHANGE UP (ref 32–36)
MCV RBC AUTO: 90.6 FL — SIGNIFICANT CHANGE UP (ref 80–100)
PLATELET # BLD AUTO: 282 K/UL — SIGNIFICANT CHANGE UP (ref 150–400)
POTASSIUM SERPL-MCNC: 5.2 MMOL/L — SIGNIFICANT CHANGE UP (ref 3.5–5.3)
POTASSIUM SERPL-SCNC: 5.2 MMOL/L — SIGNIFICANT CHANGE UP (ref 3.5–5.3)
RBC # BLD: 2.76 M/UL — LOW (ref 4.2–5.8)
RBC # FLD: 15.4 % — HIGH (ref 10.3–14.5)
SODIUM SERPL-SCNC: 141 MMOL/L — SIGNIFICANT CHANGE UP (ref 135–145)
SPECIMEN SOURCE: SIGNIFICANT CHANGE UP
SPECIMEN SOURCE: SIGNIFICANT CHANGE UP
WBC # BLD: 10.1 K/UL — SIGNIFICANT CHANGE UP (ref 3.8–10.5)
WBC # FLD AUTO: 10.1 K/UL — SIGNIFICANT CHANGE UP (ref 3.8–10.5)

## 2017-05-09 RX ORDER — ACETAMINOPHEN 500 MG
650 TABLET ORAL ONCE
Qty: 0 | Refills: 0 | Status: COMPLETED | OUTPATIENT
Start: 2017-05-09 | End: 2017-05-09

## 2017-05-09 RX ADMIN — Medication 1200 MILLIGRAM(S): at 22:53

## 2017-05-09 RX ADMIN — PRAMIPEXOLE DIHYDROCHLORIDE 0.12 MILLIGRAM(S): 0.12 TABLET ORAL at 16:50

## 2017-05-09 RX ADMIN — Medication 100 MILLIGRAM(S): at 10:25

## 2017-05-09 RX ADMIN — Medication 150 MILLIGRAM(S): at 17:00

## 2017-05-09 RX ADMIN — ISOSORBIDE MONONITRATE 120 MILLIGRAM(S): 60 TABLET, EXTENDED RELEASE ORAL at 10:26

## 2017-05-09 RX ADMIN — CEFEPIME 100 MILLIGRAM(S): 1 INJECTION, POWDER, FOR SOLUTION INTRAMUSCULAR; INTRAVENOUS at 10:24

## 2017-05-09 RX ADMIN — GABAPENTIN 600 MILLIGRAM(S): 400 CAPSULE ORAL at 22:53

## 2017-05-09 RX ADMIN — Medication 12.5 MILLIGRAM(S): at 05:39

## 2017-05-09 RX ADMIN — Medication 1 TABLET(S): at 10:24

## 2017-05-09 RX ADMIN — Medication 1 APPLICATION(S): at 16:47

## 2017-05-09 RX ADMIN — Medication 1 SPRAY(S): at 05:40

## 2017-05-09 RX ADMIN — Medication 1 APPLICATION(S): at 05:40

## 2017-05-09 RX ADMIN — Medication 1200 MILLIGRAM(S): at 05:38

## 2017-05-09 RX ADMIN — Medication 145 MILLIGRAM(S): at 10:24

## 2017-05-09 RX ADMIN — PRAMIPEXOLE DIHYDROCHLORIDE 0.12 MILLIGRAM(S): 0.12 TABLET ORAL at 22:55

## 2017-05-09 RX ADMIN — Medication 12.5 MILLIGRAM(S): at 17:00

## 2017-05-09 RX ADMIN — PRAMIPEXOLE DIHYDROCHLORIDE 0.12 MILLIGRAM(S): 0.12 TABLET ORAL at 05:40

## 2017-05-09 RX ADMIN — Medication 650 MILLIGRAM(S): at 23:45

## 2017-05-09 RX ADMIN — Medication 150 MILLIGRAM(S): at 05:39

## 2017-05-09 RX ADMIN — Medication 1 SPRAY(S): at 16:48

## 2017-05-09 RX ADMIN — ALBUTEROL 2 PUFF(S): 90 AEROSOL, METERED ORAL at 08:45

## 2017-05-09 RX ADMIN — ALBUTEROL 2 PUFF(S): 90 AEROSOL, METERED ORAL at 02:23

## 2017-05-09 RX ADMIN — GABAPENTIN 300 MILLIGRAM(S): 400 CAPSULE ORAL at 10:25

## 2017-05-09 RX ADMIN — Medication 650 MILLIGRAM(S): at 23:11

## 2017-05-09 RX ADMIN — SIMVASTATIN 10 MILLIGRAM(S): 20 TABLET, FILM COATED ORAL at 22:53

## 2017-05-09 RX ADMIN — ALBUTEROL 2 PUFF(S): 90 AEROSOL, METERED ORAL at 13:47

## 2017-05-09 RX ADMIN — Medication 0.5 MILLIGRAM(S): at 08:43

## 2017-05-09 RX ADMIN — Medication 8 MILLIGRAM(S): at 22:53

## 2017-05-09 RX ADMIN — Medication 120 MILLIGRAM(S): at 10:25

## 2017-05-09 NOTE — PROGRESS NOTE ADULT - ASSESSMENT
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

## 2017-05-09 NOTE — PROGRESS NOTE ADULT - SUBJECTIVE AND OBJECTIVE BOX
Patient is a 82y old  Male who presents with a chief complaint of Worsening leg pain, anemia, ARYA sent from Rehab (04 May 2017 15:56)      HPI:  82 year old male with history of CAD s/p stents (LAD, RCA bare metal around 9/16), A.Fib not on anticoagulation due to GI bleeding, Hypertension, COPD on home O2 2L, SHAYY on nocturnal BIPAP, ex-smoker (smoked 1ppd X 50 years, quit 22 years ago),  Chronic diastolic CHF, Hyperlipidemia, PVD, Iron deficiency anemia, Chronic back pain, Gout, BPH, CKD III with baseline Cr 2, recently admitted to  in 4/17 with anemia of GI bleeding, RLE and RUE DVTs, received pRBCs and IVC filter discharged to rehab with aspirin was sent to  ER on 5/4/17 for worsening anemia with Hb 6, worsening leg pain from ulcers and worsening renal function Cr 3.99.    Pt seen bed side.  Not well oriented currently after receiving Dilaudid for pain in ER.  As per daughters, pt was walking with a walker at rehab and was doing better.  Pt was found to have tarry black stools in ER, guaic positive. (04 May 2017 15:56)    mild reflux  has RLE pain  neg n v  feels hungry and we started clears today  mild upper abd pain and worse with movement, cant describbe it well      PAST MEDICAL & SURGICAL HISTORY:  Sepsis, due to unspecified organism: 2/2 poorly healing wounds b/l  BPH (benign prostatic hypertrophy)  Hyperlipemia  Coronary artery disease  Hypertension  Dyspepsia: On moderate exertion.  Sleep apnea, obstructive: Requires home 02 therapy, and treatment with BIPAP  Atelectasis  Pleural effusion, bilateral  Respiratory failure  Peripheral edema  CRI (chronic renal insufficiency)  Gout  CRF (chronic renal failure)  Benign prostatic hypertrophy  Spinal stenosis  Hypercholesterolemia  GERD (gastroesophageal reflux disease)  CAD (coronary artery disease)  Hypertension  S/P angioplasty with stent  Cataract of left eye  Prostate: Surgery green light procedure.  S/P rotator cuff surgery: Right  S/P angioplasty  Rotator cuff tear, right: repair      MEDICATIONS  (STANDING):  pantoprazole Infusion 8mG/Hr IV Continuous <Continuous>  buDESOnide   0.5 milliGRAM(s) Respule 0.5milliGRAM(s) Inhalation two times a day  doxazosin 8milliGRAM(s) Oral at bedtime  isosorbide   mononitrate ER Tablet (IMDUR) 120milliGRAM(s) Oral daily  diltiazem   CD 120milliGRAM(s) Oral daily  gabapentin 600milliGRAM(s) Oral at bedtime  gabapentin 300milliGRAM(s) Oral daily  allopurinol 100milliGRAM(s) Oral daily  fenofibrate Tablet 145milliGRAM(s) Oral daily  simvastatin 10milliGRAM(s) Oral at bedtime  pramipexole 0.125milliGRAM(s) Oral three times a day  ALBUTerol    90 MICROgram(s) HFA Inhaler 2Puff(s) Inhalation every 6 hours  ammonium lactate 12% Lotion 1Application(s) Topical two times a day  hydrALAZINE 150milliGRAM(s) Oral two times a day  fluticasone propionate 50 MICROgram(s)/spray Nasal Spray 1Spray(s) Alternating Nostrils two times a day  metoprolol 12.5milliGRAM(s) Oral two times a day  cefepime  IVPB 1000milliGRAM(s) IV Intermittent daily  lactobacillus acidophilus 1Tablet(s) Oral daily  acetylcysteine  Oral Solution 1200milliGRAM(s) Oral every 12 hours  sodium chloride 0.45%. 1000milliLiter(s) IV Continuous <Continuous>    MEDICATIONS  (PRN):  traMADol 100milliGRAM(s) Oral every 6 hours PRN Moderate Pain (4 - 6)  aluminum hydroxide/magnesium hydroxide/simethicone Suspension 30milliLiter(s) Oral every 6 hours PRN Dyspepsia  ondansetron Injectable 4milliGRAM(s) IV Push every 6 hours PRN Nausea  oxyCODONE  5 mG/acetaminophen 325 mG 1Tablet(s) Oral every 4 hours PRN Severe Pain (7 - 10)      Allergies    No Known Allergies    Intolerances        SOCIAL HISTORY:unchanged    FAMILY HISTORY:  No pertinent family history in first degree relatives      REVIEW OF SYSTEMS:    not reliable    Vital Signs Last 24 Hrs  T(C): 38.1, Max: 38.7 (05-09 @ 10:11)  T(F): 100.6, Max: 101.6 (05-09 @ 10:11)  HR: 88 (85 - 107)  BP: 110/90 (110/90 - 177/59)  BP(mean): 94 (61 - 94)  RR: 15 (15 - 26)  SpO2: 98% (91% - 100%)    PHYSICAL EXAM:    Constitutional: NAD, well-developed  HEENT: EOMI, throat clear  Neck: No LAD, supple  Respiratory: CTA and P  Cardiovascular: S1 and S2, RRR, no M  Gastrointestinal: BS+, soft, NT/ND, neg HSM,  Extremities: pos peripheral edema, neg clubing, cyanosis    Neurological: A/O x 2, no focal deficits  Psychiatric: Normal mood, normal affect  Skin: No rashes    LABS:  CBC Full  -  ( 09 May 2017 04:53 )  WBC Count : 10.1 K/uL  Hemoglobin : 8.3 g/dL  Hematocrit : 25.0 %  Platelet Count - Automated : 282 K/uL  Mean Cell Volume : 90.6 fl  Mean Cell Hemoglobin : 30.2 pg  Mean Cell Hemoglobin Concentration : 33.3 gm/dL  Auto Neutrophil # : x  Auto Lymphocyte # : x  Auto Monocyte # : x  Auto Eosinophil # : x  Auto Basophil # : x  Auto Neutrophil % : x  Auto Lymphocyte % : x  Auto Monocyte % : x  Auto Eosinophil % : x  Auto Basophil % : x    05-09    141  |  111<H>  |  118<H>  ----------------------------<  90  5.2   |  19<L>  |  2.58<H>    Ca    7.9<L>      09 May 2017 04:53              RADIOLOGY & ADDITIONAL STUDIES:

## 2017-05-09 NOTE — PROGRESS NOTE ADULT - SUBJECTIVE AND OBJECTIVE BOX
Cardiology Progress Note    HPI: 82 year old male with history of CAD s/p stents (LAD, RCA bare metal around 9/16), A.Fib not on anticoagulation due to GI bleeding, Hypertension, COPD on home O2 2L, SHAYY on nocturnal BIPAP, ex-smoker (smoked 1ppd X 50 years, quit 22 years ago),  Chronic diastolic CHF, Hyperlipidemia, PVD, Iron deficiency anemia, Chronic back pain, Gout, BPH, CKD III with baseline Cr 2, recently admitted to  in 4/17 with anemia of GI bleeding, RLE and RUE DVTs, received pRBCs and IVC filter discharged to rehab with aspirin was sent to  ER on 5/4/17 for worsening anemia with Hb 6, worsening leg pain from ulcers and worsening renal function Cr 3.99.    5/8- Pt this am appears confused but more alert this am compared to last Friday am.  He denies any CP or SOB.     5/9- S/p stenting of rt internal/external iliac arteries. Mildly confused presently. No CP/SOB.    PAST MEDICAL & SURGICAL HISTORY:  Sepsis, due to unspecified organism: 2/2 poorly healing wounds b/l  BPH (benign prostatic hypertrophy)  Hyperlipemia  Coronary artery disease  Hypertension  Dyspepsia: On moderate exertion.  Sleep apnea, obstructive: Requires home 02 therapy, and treatment with BIPAP  Atelectasis  Pleural effusion, bilateral  Respiratory failure  Peripheral edema  CRI (chronic renal insufficiency)  Gout  CRF (chronic renal failure)  Benign prostatic hypertrophy  Spinal stenosis  Hypercholesterolemia  GERD (gastroesophageal reflux disease)  CAD (coronary artery disease)  Hypertension  S/P angioplasty with stent  Cataract of left eye  Prostate: Surgery green light procedure.  S/P rotator cuff surgery: Right  S/P angioplasty  Rotator cuff tear, right: repair    MEDICATIONS  (STANDING):  pantoprazole Infusion 8mG/Hr IV Continuous <Continuous>  buDESOnide   0.5 milliGRAM(s) Respule 0.5milliGRAM(s) Inhalation two times a day  doxazosin 8milliGRAM(s) Oral at bedtime  isosorbide   mononitrate ER Tablet (IMDUR) 120milliGRAM(s) Oral daily  diltiazem   CD 120milliGRAM(s) Oral daily  gabapentin 600milliGRAM(s) Oral at bedtime  gabapentin 300milliGRAM(s) Oral daily  allopurinol 100milliGRAM(s) Oral daily  fenofibrate Tablet 145milliGRAM(s) Oral daily  simvastatin 10milliGRAM(s) Oral at bedtime  pramipexole 0.125milliGRAM(s) Oral three times a day  ALBUTerol    90 MICROgram(s) HFA Inhaler 2Puff(s) Inhalation every 6 hours  ammonium lactate 12% Lotion 1Application(s) Topical two times a day  hydrALAZINE 150milliGRAM(s) Oral two times a day  fluticasone propionate 50 MICROgram(s)/spray Nasal Spray 1Spray(s) Alternating Nostrils two times a day  metoprolol 12.5milliGRAM(s) Oral two times a day  cefepime  IVPB 1000milliGRAM(s) IV Intermittent daily    MEDICATIONS  (PRN):  acetaminophen   Tablet 650milliGRAM(s) Oral every 6 hours PRN For Temp greater than 38 C (100.4 F)  traMADol 100milliGRAM(s) Oral every 6 hours PRN Moderate Pain (4 - 6)  aluminum hydroxide/magnesium hydroxide/simethicone Suspension 30milliLiter(s) Oral every 6 hours PRN Dyspepsia  ondansetron Injectable 4milliGRAM(s) IV Push every 6 hours PRN Nausea    FAMILY HISTORY:  No pertinent family history in first degree relatives    SOCIAL HISTORY: non smoker, no alcohol use    REVIEW OF SYSTEMS: Unable to obtain as pt is confused    Vital Signs Last 24 Hrs  T(C): 37.9, Max: 38.2 (05-04 @ 23:00)  T(F): 100.3, Max: 100.8 (05-04 @ 23:00)  HR: 97 (89 - 115)  BP: 122/36 (106/37 - 156/41)  BP(mean): 59 (47 - 72)  RR: 19 (15 - 25)  SpO2: 94% (89% - 100%)    PHYSICAL EXAM-  Constitutional: ill looking pt with confusion.  Head: Head is normocephalic and atraumatic.    Neck: The patient's neck is supple without enlargement, has no palpable thyromegaly nor thyroid nodules and has no jugular venous distention. No audible carotid bruits. There are strong carotid pulses bilaterally. No JVD.   Cardiovascular: Regular rate and rhythm without S3, S4. No murmurs or rubs are appreciated.    Respiratory: Breath sounds are normal. No rales. No wheezing.  Abdomen: Soft, nontender, nondistended with positive bowel sounds.    Extremity: both extremities are wrapped currently foul smelling .  Neurologic: confused     INTERPRETATION OF TELEMETRY: sinus tachycardia    ECG:sinus tachycardia with normal axis, ST depression in V5-7.    I&O's Detail    I & Os for current day (as of 05 May 2017 08:04)  =============================================  IN:    Packed Red Blood Cells: 647 ml    Total IN: 647 ml  ---------------------------------------------  OUT:    Voided: 400 ml    Total OUT: 400 ml  ---------------------------------------------  Total NET: 247 ml      LABS:                        6.8    14.1  )-----------( 375      ( 05 May 2017 05:10 )             20.1     05-05    149<H>  |  118<H>  |  143<H>  ----------------------------<  109<H>  4.9   |  21<L>  |  3.68<H>    Ca    8.3<L>      05 May 2017 05:10  Mg     2.8     05-05    TPro  6.6  /  Alb  2.4<L>  /  TBili  0.2  /  DBili  x   /  AST  20  /  ALT  15  /  AlkPhos  72  05-04    CARDIAC MARKERS ( 04 May 2017 12:54 )  <0.015 ng/mL / x     / 99 U/L / x     / x        PT/INR - ( 04 May 2017 12:54 )   PT: 13.6 sec;   INR: 1.25 ratio      PTT - ( 04 May 2017 12:54 )  PTT:38.8 sec    BNP    RADIOLOGY & ADDITIONAL STUDIES:

## 2017-05-09 NOTE — PROGRESS NOTE ADULT - SUBJECTIVE AND OBJECTIVE BOX
NEPHROLOGY INTERVAL HPI/OVERNIGHT EVENTS:   SY  Resting comfortably though was in pain earlier requiring pain meds.  now more somnolent.  No acute distress.  S/p LE angiogram and stent placement in Right common iliac and external iliac arteries.  no complications.    5/7 SY  No acute events overnight    Per pt's daughter, pain control now a little improved.    HPI:  82 year old male with history of CAD s/p stents (LAD, RCA bare metal around ), A.Fib not on anticoagulation due to GI bleeding, Hypertension, COPD on home O2 2L, SHAYY on nocturnal BIPAP, ex-smoker (smoked 1ppd X 50 years, quit 22 years ago),  Chronic diastolic CHF, Hyperlipidemia, PVD, Iron deficiency anemia, Chronic back pain, Gout, BPH, CKD III with baseline Cr 2, recently admitted to  in  with anemia of GI bleeding, RLE and RUE DVTs, received pRBCs and IVC filter discharged to rehab with aspirin was sent to  ER on 17 for worsening anemia with Hb 6, worsening leg pain from ulcers and worsening renal function Cr 3.99.    Pt seen bed side.  Not well oriented currently after receiving Dilaudid for pain in ER.  As per daughters, pt was walking with a walker at rehab and was doing better.  Pt was found to have tarry black stools in ER, guaic positive. (04 May 2017 15:56)      MEDICATIONS  (STANDING):  pantoprazole Infusion 8mG/Hr IV Continuous <Continuous>  buDESOnide   0.5 milliGRAM(s) Respule 0.5milliGRAM(s) Inhalation two times a day  doxazosin 8milliGRAM(s) Oral at bedtime  isosorbide   mononitrate ER Tablet (IMDUR) 120milliGRAM(s) Oral daily  diltiazem   CD 120milliGRAM(s) Oral daily  gabapentin 600milliGRAM(s) Oral at bedtime  gabapentin 300milliGRAM(s) Oral daily  allopurinol 100milliGRAM(s) Oral daily  fenofibrate Tablet 145milliGRAM(s) Oral daily  simvastatin 10milliGRAM(s) Oral at bedtime  pramipexole 0.125milliGRAM(s) Oral three times a day  ALBUTerol    90 MICROgram(s) HFA Inhaler 2Puff(s) Inhalation every 6 hours  ammonium lactate 12% Lotion 1Application(s) Topical two times a day  hydrALAZINE 150milliGRAM(s) Oral two times a day  fluticasone propionate 50 MICROgram(s)/spray Nasal Spray 1Spray(s) Alternating Nostrils two times a day  metoprolol 12.5milliGRAM(s) Oral two times a day  cefepime  IVPB 1000milliGRAM(s) IV Intermittent daily  lactobacillus acidophilus 1Tablet(s) Oral daily  acetylcysteine  Oral Solution 1200milliGRAM(s) Oral every 12 hours  sodium chloride 0.45%. 1000milliLiter(s) IV Continuous <Continuous>    MEDICATIONS  (PRN):  traMADol 100milliGRAM(s) Oral every 6 hours PRN Moderate Pain (4 - 6)  aluminum hydroxide/magnesium hydroxide/simethicone Suspension 30milliLiter(s) Oral every 6 hours PRN Dyspepsia  ondansetron Injectable 4milliGRAM(s) IV Push every 6 hours PRN Nausea  oxyCODONE  5 mG/acetaminophen 325 mG 1Tablet(s) Oral every 4 hours PRN Severe Pain (7 - 10)          Vital Signs Last 24 Hrs  T(C): 38.7, Max: 38.7 ( @ 10:11)  T(F): 101.6, Max: 101.6 ( @ 10:11)  HR: 95 (85 - 104)  BP: 171/41 (125/89 - 172/59)  BP(mean): 74 (61 - 81)  RR: 20 (15 - 26)  SpO2: 97% (91% - 100%)  Daily Height in cm: 165.1 (08 May 2017 14:49)    Daily Weight in k.2 (09 May 2017 04:54)    I & Os for current day (as of  @ 13:28)  =============================================  IN: 1758 ml / OUT: 1200 ml / NET: 558 ml      PHYSICAL EXAM:  Arousable and responds appropriately once awoken  GENERAL: No acute distress.  CHEST/LUNG: fair air entry  HEART: S1S2 RRR  ABDOMEN: obese  EXTREMITIES: Bilateral LE dressings.  SKIN:     LABS:                        8.3    10.1  )-----------( 282      ( 09 May 2017 04:53 )             25.0     05-09    141  |  111<H>  |  118<H>  ----------------------------<  90  5.2   |  19<L>  |  2.58<H>    Ca    7.9<L>      09 May 2017 04:53                  RADIOLOGY & ADDITIONAL TESTS:

## 2017-05-09 NOTE — PROGRESS NOTE ADULT - SUBJECTIVE AND OBJECTIVE BOX
Pt was seen and examined at bedside because RN called to eval. for lethargy. Pt was seen and examined with Night RN and family member at bedside. Pt is Alert and awake, answering questions. Denies any acute c/o. Appears comfortable. Denies headache, dizziness chest pain, palpitations or SOB.     SBP: 170's. Pt is due for scheduled routine Anti HTN meds.  Cautious use of pain meds  D/w Night RN to hold on a dose of percocet at present and Monitor BP.

## 2017-05-09 NOTE — PROGRESS NOTE ADULT - ASSESSMENT
severe PAD.  05/08 angiogram + angioplasy.  stenting of right common iliac + external iliac arteries.  +/- intervention CFA disease.  statin.  VSx following.    chronic right LE stasis ulcers, dermatitis w/ cellulitis.  c/w cefepime.  ID following.    GIB.  acute blood loss anemia.  s/p 5 units PRBCs.  +/- EGD if respiratory status stabilizes.  c/w PPI gtt.  GI following.    ARYA upon CKD.  prerenal/dehydration.  Cr close to baseline.  c/w IVFs and mucomyst.  +/- need for HD.  Nephrology following.    multiple DVT RUE/RLL.  s/p IVCF.

## 2017-05-09 NOTE — PROGRESS NOTE ADULT - SUBJECTIVE AND OBJECTIVE BOX
Patient is a 82y old  Male who presents with a chief complaint of weakness  HPI:  82 y/o male with h/of COPD, CHF, chronic renal failure, paroxysmal A-fib.with, hematochezia s/p bleeding scan and flex sigmoidoscopy, anemia, chronic LE stasis dermatitis  was was admitted for increased weakness and wheepy right LE and foot. The patient had a RLE ABDULAZIZ boot for chronic venous stasis ulcers and lower extremity chronic edema that was removed recently.. The daughter reports increased oozing from LE ulcers; she reports thet the right foot ulcers were debrided the day PTA. No fever or chills reported. In Ed patient noted to have Hb 6. He received vancomycin 1 gm IV x 1 and cefepime 1 gm IV x 1.    Lying in bed in NAD  Weak looking  Denies pain  s/p angiogram and angioplasty yesterday  Tmax 101.6F    MEDICATIONS  (STANDING):  pantoprazole Infusion 8mG/Hr IV Continuous <Continuous>  buDESOnide   0.5 milliGRAM(s) Respule 0.5milliGRAM(s) Inhalation two times a day  doxazosin 8milliGRAM(s) Oral at bedtime  isosorbide   mononitrate ER Tablet (IMDUR) 120milliGRAM(s) Oral daily  diltiazem   CD 120milliGRAM(s) Oral daily  gabapentin 600milliGRAM(s) Oral at bedtime  gabapentin 300milliGRAM(s) Oral daily  allopurinol 100milliGRAM(s) Oral daily  fenofibrate Tablet 145milliGRAM(s) Oral daily  simvastatin 10milliGRAM(s) Oral at bedtime  pramipexole 0.125milliGRAM(s) Oral three times a day  ALBUTerol    90 MICROgram(s) HFA Inhaler 2Puff(s) Inhalation every 6 hours  ammonium lactate 12% Lotion 1Application(s) Topical two times a day  hydrALAZINE 150milliGRAM(s) Oral two times a day  fluticasone propionate 50 MICROgram(s)/spray Nasal Spray 1Spray(s) Alternating Nostrils two times a day  metoprolol 12.5milliGRAM(s) Oral two times a day  cefepime  IVPB 1000milliGRAM(s) IV Intermittent daily  lactobacillus acidophilus 1Tablet(s) Oral daily  acetylcysteine  Oral Solution 1200milliGRAM(s) Oral every 12 hours  sodium chloride 0.45%. 1000milliLiter(s) IV Continuous <Continuous>    MEDICATIONS  (PRN):  traMADol 100milliGRAM(s) Oral every 6 hours PRN Moderate Pain (4 - 6)  aluminum hydroxide/magnesium hydroxide/simethicone Suspension 30milliLiter(s) Oral every 6 hours PRN Dyspepsia  ondansetron Injectable 4milliGRAM(s) IV Push every 6 hours PRN Nausea  oxyCODONE  5 mG/acetaminophen 325 mG 1Tablet(s) Oral every 4 hours PRN Severe Pain (7 - 10)      Vital Signs Last 24 Hrs  T(C): 38.7, Max: 38.7 (05-09 @ 10:11)  T(F): 101.6, Max: 101.6 (05-09 @ 10:11)  HR: 95 (83 - 104)  BP: 171/41 (125/89 - 172/59)  BP(mean): 74 (61 - 81)  RR: 20 (15 - 26)  SpO2: 97% (91% - 100%)    Physical Exam:    Constitutional: frail looking  HEENT: NC/AT, EOMI, PERRLA  Neck: supple  Back: no tenderness  Respiratory: few basal rales  Cardiovascular: S1S2 regular, no murmurs  Abdomen: soft, not tender, not distended, positive BS  Genitourinary: deferred  Rectal: deferred  Musculoskeletal: no muscle tenderness, no joint swelling or tenderness  Extremities: b/l chronic skin changes with edema; RLE: dorsal aspect of foot and lower ankle superficial ulcers with scant discharge; surrounding erythema and edema - slightly improved  Neurological: confused, moving all extremities, no focal deficits  Skin: no rashes    Labs:                        8.3    10.1  )-----------( 282      ( 09 May 2017 04:53 )             25.0     05-09    141  |  111<H>  |  118<H>  ----------------------------<  90  5.2   |  19<L>  |  2.58<H>    Ca    7.9<L>      09 May 2017 04:53               7.4    13.7  )-----------( 354      ( 07 May 2017 06:47 )             23.9     05-07    148<H>  |  115<H>  |  136<H>  ----------------------------<  166<H>  4.8   |  22  |  3.42<H>    Ca    8.2<L>      07 May 2017 06:47                 8.3    14.5  )-----------( 370      ( 06 May 2017 04:43 )             26.7     05-06    153<H>  |  121<H>  |  142<H>  ----------------------------<  101<H>  5.0   |  20<L>  |  3.67<H>    Ca    8.2<L>      06 May 2017 04:43  Mg     2.8     05-05    TPro  6.6  /  Alb  2.4<L>  /  TBili  0.2  /  DBili  x   /  AST  20  /  ALT  15  /  AlkPhos  72  05-04               6.8    14.1  )-----------( 375      ( 05 May 2017 05:10 )             20.1     05-05    149<H>  |  118<H>  |  143<H>  ----------------------------<  109<H>  4.9   |  21<L>  |  3.68<H>    Ca    8.3<L>      05 May 2017 05:10  Mg     2.8     05-05    TPro  6.6  /  Alb  2.4<L>  /  TBili  0.2  /  DBili  x   /  AST  20  /  ALT  15  /  AlkPhos  72  05-04               6.3    10.8  )-----------( 398      ( 04 May 2017 12:54 )             21.8     05-04    145  |  112<H>  |  138<H>  ----------------------------<  123<H>  5.5<H>   |  19<L>  |  3.99<H>    Ca    8.8      04 May 2017 12:54    TPro  6.6  /  Alb  2.4<L>  /  TBili  0.2  /  DBili  x   /  AST  20  /  ALT  15  /  AlkPhos  72  05-04     LIVER FUNCTIONS - ( 04 May 2017 12:54 )  Alb: 2.4 g/dL / Pro: 6.6 gm/dL / ALK PHOS: 72 U/L / ALT: 15 U/L / AST: 20 U/L / GGT: x           Culture - Blood (05.04.17 @ 13:21)    Specimen Source: .Blood None    Culture Results:   No growth to date.        Radiology:    Advanced directives addressed: full resuscitation

## 2017-05-09 NOTE — PROGRESS NOTE ADULT - SUBJECTIVE AND OBJECTIVE BOX
TD  05/09:  IS.  daughter (ICU RN @ [facility]).  patient arrousable but confused.  O2 saturation 2 LPM via NC.  pain controlled.    MEDICATIONS  (STANDING):  pantoprazole Infusion 8mG/Hr IV Continuous <Continuous>  buDESOnide   0.5 milliGRAM(s) Respule 0.5milliGRAM(s) Inhalation two times a day  doxazosin 8milliGRAM(s) Oral at bedtime  isosorbide   mononitrate ER Tablet (IMDUR) 120milliGRAM(s) Oral daily  diltiazem   CD 120milliGRAM(s) Oral daily  gabapentin 600milliGRAM(s) Oral at bedtime  gabapentin 300milliGRAM(s) Oral daily  allopurinol 100milliGRAM(s) Oral daily  fenofibrate Tablet 145milliGRAM(s) Oral daily  simvastatin 10milliGRAM(s) Oral at bedtime  pramipexole 0.125milliGRAM(s) Oral three times a day  ALBUTerol    90 MICROgram(s) HFA Inhaler 2Puff(s) Inhalation every 6 hours  ammonium lactate 12% Lotion 1Application(s) Topical two times a day  hydrALAZINE 150milliGRAM(s) Oral two times a day  fluticasone propionate 50 MICROgram(s)/spray Nasal Spray 1Spray(s) Alternating Nostrils two times a day  metoprolol 12.5milliGRAM(s) Oral two times a day  cefepime  IVPB 1000milliGRAM(s) IV Intermittent daily  lactobacillus acidophilus 1Tablet(s) Oral daily  acetylcysteine  Oral Solution 1200milliGRAM(s) Oral every 12 hours  sodium chloride 0.45%. 1000milliLiter(s) IV Continuous <Continuous>    MEDICATIONS  (PRN):  traMADol 100milliGRAM(s) Oral every 6 hours PRN Moderate Pain (4 - 6)  aluminum hydroxide/magnesium hydroxide/simethicone Suspension 30milliLiter(s) Oral every 6 hours PRN Dyspepsia  ondansetron Injectable 4milliGRAM(s) IV Push every 6 hours PRN Nausea  oxyCODONE  5 mG/acetaminophen 325 mG 1Tablet(s) Oral every 4 hours PRN Severe Pain (7 - 10)    Vital Signs Last 24 Hrs  T(C): 38.7, Max: 38.7 (05-09 @ 10:11)  T(F): 101.6, Max: 101.6 (05-09 @ 10:11)  HR: 95 (85 - 104)  BP: 171/41 (125/89 - 172/59)  BP(mean): 74 (61 - 81)  RR: 20 (15 - 26)  SpO2: 97% (91% - 100%)    elderly wm confused put NAD.  lungs clear.  heart regular.  no MRG.  soft.  obsese.  NTND.  (+) b/l dressing clean and dry.                        8.3    10.1  )-----------( 282      ( 09 May 2017 04:53 )             25.0       05-09    141  |  111<H>  |  118<H>  ----------------------------<  90  5.2   |  19<L>  |  2.58<H>    Ca    7.9<L>      09 May 2017 04:53

## 2017-05-09 NOTE — PROGRESS NOTE ADULT - ASSESSMENT
# ARYA due to pre-renal azothemia with CKD stage 4 at baseline ( 2.0-2.5)  # Anemia due to acute blood loss anemia  # Sepsis? due to LE ulcers  # Multiple DVT on RUE/RLE with s/p IVC fileter  # HTN    PLAN  - moniter off diuretics ( only inbetween blood transfusion) and fu response to the PRBC transfusion  - continue with current anti HTN  - strick I/O   - abx as per ID, pt given one dose of vanc in ER  family updated at bedside    5/6 MK  - ARYA/CKD stage 4, non oliguric with slow recovery indicative of likely compnent of ATN. Agree with IVF composition to d5w and will moniter with advancing of the diet and uop.  - Severe PVD with ischmic rt foot.  ? cellulitis    abx as per ID.  jerald dugan and family regarding timing of angiogram and possibiltuy of HD discussed with family. Will moniter the progress over th weekend    5/7 SY  --ARYA/CKD   Creat slowly trending down.. Continue to monitor.  Continue to hold diuretics.  Continue H2S--Bindtpgwhulpe slowly improving.  --PVD  Will need decide on timing of angiogram, though this may lead to Dialysis at least temporarily at best.  Continue abtx.    5/7 MK  - ARYA/ CKD stage 4, improving....   likely pre-renal   - PVD, planned for LE angiogram today with dr dugan, mucomyst started. continue with ivf, will change to 1/2 ns.  jerald pascual at bedside, again aware of the risk for HD    5/9 SY  --ARYA/CKD    Creat now stabilized.  Continue to monitor for now post angiogram.  Continue current IVF for another day.  --PVD  Post Stent placement in Right common and external iliac arteries.  Continue to monitor wounds.  --Electrolytes improved and stable.

## 2017-05-09 NOTE — PROGRESS NOTE ADULT - ASSESSMENT
82 y/o male with h/o COPD, CHF, chronic renal failure, paroxysmal A-fib.with, hematochezia s/p bleeding scan and flex sigmoidoscopy, anemia, chronic LE stasis dermatitis  was was admitted for increased weakness and wheepy right LE and foot. The patient had a RLE ABDULAZIZ boot for chronic venous stasis ulcers and lower extremity chronic edema that was removed recently.. The daughter reports increased oozing from LE ulcers; she reports thet the right foot ulcers were debrided the day PTA. No fever or chills reported. In Ed patient noted to have Hb 6. He received vancomycin 1 gm IV x 1 and cefepime 1 gm IV x 1.    1. Chronic right LE stasis ulcers. Chronic dermatitis with likely superimposed cellulitis. Severe PVD s/p angioplasty. ARF. Severe anemia.  -febrile syndrome ?postoperative  -left foot wound - no purulence noted  -f/u BC x 2  -on cefepime 1 gm IV qd # 6  -tolerating abx well so far; no side effects noted   -continue abx coverage  -monitor BMP  -local wound care  -monitor temps  -f/u CBC  -supportive care  2. Other issues: COPD, CHF, chronic renal failure, paroxysmal A-fib.with, hematochezia   -care per medicine

## 2017-05-09 NOTE — PROGRESS NOTE ADULT - ASSESSMENT
anemia, likely multifactorial  I suspect that there is a component of GI bleeding, appears stabilized on PPI gtt and hct stable  He has received 5 units of packed red blood cells and getting 5th one now    will delay EGD due to low o2 sat, DW family again        pt now more alert and awake, but is variable  per D hallucinates at times      I had an extensive discussion with the patient's daughter. Secondary to his multiple comorbid disorders, at this time, we will delay endoscopic evaluation. It is likely that he would require intubation and may have respiratory compromise.  He likely also septic.  We'll continue Protonix drip    Serial H&H's every 8 hours.    clears started          Lower extremity DVT, status post IVC filter    Sepsis likely secondary to lower extremity cellulitis and antibiotics will be continued.  Infectious disease note was appreciated.    Advanced COPD and obstructive sleep apnea reviewed.

## 2017-05-09 NOTE — PROGRESS NOTE ADULT - ASSESSMENT
1. GI bleed-Anemia- GI team evaluaing pt , EGD on hold. S/p PRBC multiple unit transfusion.    2. CAD-  From cardiology standpoint he had longstanding history of coronary artery disease. He underwent percutaneous coronary intervention and received a stent in the left anterior descending artery in 2002 and most recently, on September 28, 2016, he underwent rotational atherectomy and received an Integrity bare metal stent in the proximal and mid RCA.   Will continue oupt meds for CAD as tolerated.    3. CRI- as per nephrology. Cr. mildly improved today.     4. Hyperlipiemia- continue statin    5. PVD- s/p rt internal/external iliac stenting. Further mgmt as per vascular surgery.     6. DVT proph.

## 2017-05-09 NOTE — PROGRESS NOTE ADULT - SUBJECTIVE AND OBJECTIVE BOX
status post angiogram and angioplasty and stenting of R common iliac and external iliac arteries    stable overnight; states R foot pain improved     MEDICATIONS  (STANDING):  pantoprazole Infusion 8mG/Hr IV Continuous <Continuous>  buDESOnide   0.5 milliGRAM(s) Respule 0.5milliGRAM(s) Inhalation two times a day  doxazosin 8milliGRAM(s) Oral at bedtime  isosorbide   mononitrate ER Tablet (IMDUR) 120milliGRAM(s) Oral daily  diltiazem   CD 120milliGRAM(s) Oral daily  gabapentin 600milliGRAM(s) Oral at bedtime  gabapentin 300milliGRAM(s) Oral daily  allopurinol 100milliGRAM(s) Oral daily  fenofibrate Tablet 145milliGRAM(s) Oral daily  simvastatin 10milliGRAM(s) Oral at bedtime  pramipexole 0.125milliGRAM(s) Oral three times a day  ALBUTerol    90 MICROgram(s) HFA Inhaler 2Puff(s) Inhalation every 6 hours  ammonium lactate 12% Lotion 1Application(s) Topical two times a day  hydrALAZINE 150milliGRAM(s) Oral two times a day  fluticasone propionate 50 MICROgram(s)/spray Nasal Spray 1Spray(s) Alternating Nostrils two times a day  metoprolol 12.5milliGRAM(s) Oral two times a day  cefepime  IVPB 1000milliGRAM(s) IV Intermittent daily  lactobacillus acidophilus 1Tablet(s) Oral daily  acetylcysteine  Oral Solution 1200milliGRAM(s) Oral every 12 hours  sodium chloride 0.45%. 1000milliLiter(s) IV Continuous <Continuous>    MEDICATIONS  (PRN):  traMADol 100milliGRAM(s) Oral every 6 hours PRN Moderate Pain (4 - 6)  aluminum hydroxide/magnesium hydroxide/simethicone Suspension 30milliLiter(s) Oral every 6 hours PRN Dyspepsia  ondansetron Injectable 4milliGRAM(s) IV Push every 6 hours PRN Nausea  oxyCODONE  5 mG/acetaminophen 325 mG 1Tablet(s) Oral every 4 hours PRN Severe Pain (7 - 10)      Allergies    No Known Allergies    Intolerances        Flatus: [ ] YES [ ] NO             Bowel Movement: [ ] YES [ ] NO  Pain (0-10):            Pain Control Adequate: [ ] YES [ ] NO  Nausea: [ ] YES [ ] NO            Vomiting: [ ] YES [ ] NO  Diarrhea: [ ] YES [ ] NO         Constipation: [ ] YES [ ] NO     Chest Pain: [ ] YES [ ] NO    SOB:  [ ] YES [ ] NO    Vital Signs Last 24 Hrs  T(C): 36.7, Max: 37.6 (05-08 @ 22:55)  T(F): 98, Max: 99.7 (05-08 @ 22:55)  HR: 99 (76 - 104)  BP: 171/41 (125/89 - 172/59)  BP(mean): 74 (60 - 81)  RR: 20 (15 - 26)  SpO2: 91% (91% - 100%)    I&O's Summary    I & Os for current day (as of 09 May 2017 08:19)  =============================================  IN: 1758 ml / OUT: 1200 ml / NET: 558 ml      Physical Exam:  General: NAD, resting comfortably  Pulmonary: normal resp effort, CTA-B  Cardiovascular: NSR  Abdominal: soft, NT/ND  Extremities: WWP, normal strength  Neuro: A/O x 3, CNs II-XII grossly intact, normal motor/sensation, no focal deficits  Pulses:   Right:                                                                          Left:  FEM [ ]2+ [ ]1+ [ ]doppler                                             FEM [ ]2+ [ ]1+ [ ]doppler    POP [ ]2+ [ ]1+ [ ]doppler                                             POP [ ]2+ [ ]1+ [ ]doppler    DP [ ]2+ [ ]1+ [ ]doppler                                                DP [ ]2+ [ ]1+ [ ]doppler  PT[ ]2+ [ ]1+ [ ]doppler                                                  PT [ ]2+ [ ]1+ [ ]doppler    No groin hematoma on either limb; L foot pink and warm; R foot warm but blanches on elevation    LABS:                        8.3    10.1  )-----------( 282      ( 09 May 2017 04:53 )             25.0     05-09    141  |  111<H>  |  118<H>  ----------------------------<  90  5.2   |  19<L>  |  2.58<H>    Ca    7.9<L>      09 May 2017 04:53            CAPILLARY BLOOD GLUCOSE      RADIOLOGY & ADDITIONAL TESTS:

## 2017-05-10 LAB
ANION GAP SERPL CALC-SCNC: 12 MMOL/L — SIGNIFICANT CHANGE UP (ref 5–17)
BLD GP AB SCN SERPL QL: SIGNIFICANT CHANGE UP
BUN SERPL-MCNC: 119 MG/DL — HIGH (ref 7–23)
CALCIUM SERPL-MCNC: 7.8 MG/DL — LOW (ref 8.5–10.1)
CHLORIDE SERPL-SCNC: 113 MMOL/L — HIGH (ref 96–108)
CO2 SERPL-SCNC: 18 MMOL/L — LOW (ref 22–31)
CREAT SERPL-MCNC: 2.71 MG/DL — HIGH (ref 0.5–1.3)
GLUCOSE SERPL-MCNC: 89 MG/DL — SIGNIFICANT CHANGE UP (ref 70–99)
HCT VFR BLD CALC: 25.7 % — LOW (ref 39–50)
HGB BLD-MCNC: 8 G/DL — LOW (ref 13–17)
MCHC RBC-ENTMCNC: 28 PG — SIGNIFICANT CHANGE UP (ref 27–34)
MCHC RBC-ENTMCNC: 31 GM/DL — LOW (ref 32–36)
MCV RBC AUTO: 90.6 FL — SIGNIFICANT CHANGE UP (ref 80–100)
PLATELET # BLD AUTO: 273 K/UL — SIGNIFICANT CHANGE UP (ref 150–400)
POTASSIUM SERPL-MCNC: 5 MMOL/L — SIGNIFICANT CHANGE UP (ref 3.5–5.3)
POTASSIUM SERPL-SCNC: 5 MMOL/L — SIGNIFICANT CHANGE UP (ref 3.5–5.3)
RBC # BLD: 2.84 M/UL — LOW (ref 4.2–5.8)
RBC # FLD: 15.8 % — HIGH (ref 10.3–14.5)
SODIUM SERPL-SCNC: 143 MMOL/L — SIGNIFICANT CHANGE UP (ref 135–145)
TYPE + AB SCN PNL BLD: SIGNIFICANT CHANGE UP
WBC # BLD: 8.9 K/UL — SIGNIFICANT CHANGE UP (ref 3.8–10.5)
WBC # FLD AUTO: 8.9 K/UL — SIGNIFICANT CHANGE UP (ref 3.8–10.5)

## 2017-05-10 RX ORDER — FUROSEMIDE 40 MG
20 TABLET ORAL ONCE
Qty: 0 | Refills: 0 | Status: COMPLETED | OUTPATIENT
Start: 2017-05-10 | End: 2017-05-10

## 2017-05-10 RX ORDER — ACETAMINOPHEN 500 MG
650 TABLET ORAL ONCE
Qty: 0 | Refills: 0 | Status: COMPLETED | OUTPATIENT
Start: 2017-05-10 | End: 2017-05-10

## 2017-05-10 RX ADMIN — Medication 150 MILLIGRAM(S): at 18:09

## 2017-05-10 RX ADMIN — Medication 1 SPRAY(S): at 06:14

## 2017-05-10 RX ADMIN — PRAMIPEXOLE DIHYDROCHLORIDE 0.12 MILLIGRAM(S): 0.12 TABLET ORAL at 22:15

## 2017-05-10 RX ADMIN — Medication 1 APPLICATION(S): at 23:08

## 2017-05-10 RX ADMIN — CEFEPIME 100 MILLIGRAM(S): 1 INJECTION, POWDER, FOR SOLUTION INTRAMUSCULAR; INTRAVENOUS at 13:09

## 2017-05-10 RX ADMIN — Medication 120 MILLIGRAM(S): at 12:34

## 2017-05-10 RX ADMIN — PRAMIPEXOLE DIHYDROCHLORIDE 0.12 MILLIGRAM(S): 0.12 TABLET ORAL at 13:19

## 2017-05-10 RX ADMIN — Medication 150 MILLIGRAM(S): at 06:12

## 2017-05-10 RX ADMIN — Medication 8 MILLIGRAM(S): at 22:13

## 2017-05-10 RX ADMIN — Medication 1 APPLICATION(S): at 08:22

## 2017-05-10 RX ADMIN — ALBUTEROL 2 PUFF(S): 90 AEROSOL, METERED ORAL at 08:32

## 2017-05-10 RX ADMIN — Medication 145 MILLIGRAM(S): at 12:35

## 2017-05-10 RX ADMIN — Medication 1 SPRAY(S): at 18:09

## 2017-05-10 RX ADMIN — ALBUTEROL 2 PUFF(S): 90 AEROSOL, METERED ORAL at 02:08

## 2017-05-10 RX ADMIN — Medication 1 TABLET(S): at 12:35

## 2017-05-10 RX ADMIN — Medication 650 MILLIGRAM(S): at 14:18

## 2017-05-10 RX ADMIN — SODIUM CHLORIDE 75 MILLILITER(S): 9 INJECTION, SOLUTION INTRAVENOUS at 06:13

## 2017-05-10 RX ADMIN — PRAMIPEXOLE DIHYDROCHLORIDE 0.12 MILLIGRAM(S): 0.12 TABLET ORAL at 06:11

## 2017-05-10 RX ADMIN — SIMVASTATIN 10 MILLIGRAM(S): 20 TABLET, FILM COATED ORAL at 22:14

## 2017-05-10 RX ADMIN — Medication 1200 MILLIGRAM(S): at 22:14

## 2017-05-10 RX ADMIN — ALBUTEROL 2 PUFF(S): 90 AEROSOL, METERED ORAL at 14:13

## 2017-05-10 RX ADMIN — Medication 100 MILLIGRAM(S): at 12:35

## 2017-05-10 RX ADMIN — ALBUTEROL 2 PUFF(S): 90 AEROSOL, METERED ORAL at 20:22

## 2017-05-10 RX ADMIN — Medication 1200 MILLIGRAM(S): at 12:34

## 2017-05-10 RX ADMIN — Medication 12.5 MILLIGRAM(S): at 18:09

## 2017-05-10 RX ADMIN — ISOSORBIDE MONONITRATE 120 MILLIGRAM(S): 60 TABLET, EXTENDED RELEASE ORAL at 12:35

## 2017-05-10 RX ADMIN — Medication 20 MILLIGRAM(S): at 17:34

## 2017-05-10 RX ADMIN — Medication 0.5 MILLIGRAM(S): at 20:22

## 2017-05-10 RX ADMIN — PANTOPRAZOLE SODIUM 10 MG/HR: 20 TABLET, DELAYED RELEASE ORAL at 17:34

## 2017-05-10 RX ADMIN — GABAPENTIN 600 MILLIGRAM(S): 400 CAPSULE ORAL at 22:14

## 2017-05-10 RX ADMIN — Medication 12.5 MILLIGRAM(S): at 06:12

## 2017-05-10 RX ADMIN — GABAPENTIN 300 MILLIGRAM(S): 400 CAPSULE ORAL at 12:35

## 2017-05-10 NOTE — PROGRESS NOTE ADULT - SUBJECTIVE AND OBJECTIVE BOX
complaining mainly of buttock pain and R leg pain during dressing changes    Cr decreased to 2.71; Hct stable at 25    MEDICATIONS  (STANDING):  pantoprazole Infusion 8mG/Hr IV Continuous <Continuous>  buDESOnide   0.5 milliGRAM(s) Respule 0.5milliGRAM(s) Inhalation two times a day  doxazosin 8milliGRAM(s) Oral at bedtime  isosorbide   mononitrate ER Tablet (IMDUR) 120milliGRAM(s) Oral daily  diltiazem   CD 120milliGRAM(s) Oral daily  gabapentin 600milliGRAM(s) Oral at bedtime  gabapentin 300milliGRAM(s) Oral daily  allopurinol 100milliGRAM(s) Oral daily  fenofibrate Tablet 145milliGRAM(s) Oral daily  simvastatin 10milliGRAM(s) Oral at bedtime  pramipexole 0.125milliGRAM(s) Oral three times a day  ALBUTerol    90 MICROgram(s) HFA Inhaler 2Puff(s) Inhalation every 6 hours  ammonium lactate 12% Lotion 1Application(s) Topical two times a day  hydrALAZINE 150milliGRAM(s) Oral two times a day  fluticasone propionate 50 MICROgram(s)/spray Nasal Spray 1Spray(s) Alternating Nostrils two times a day  metoprolol 12.5milliGRAM(s) Oral two times a day  cefepime  IVPB 1000milliGRAM(s) IV Intermittent daily  lactobacillus acidophilus 1Tablet(s) Oral daily  acetylcysteine  Oral Solution 1200milliGRAM(s) Oral every 12 hours  sodium chloride 0.45%. 1000milliLiter(s) IV Continuous <Continuous>    MEDICATIONS  (PRN):  traMADol 100milliGRAM(s) Oral every 6 hours PRN Moderate Pain (4 - 6)  aluminum hydroxide/magnesium hydroxide/simethicone Suspension 30milliLiter(s) Oral every 6 hours PRN Dyspepsia  ondansetron Injectable 4milliGRAM(s) IV Push every 6 hours PRN Nausea  oxyCODONE  5 mG/acetaminophen 325 mG 1Tablet(s) Oral every 4 hours PRN Severe Pain (7 - 10)      Allergies    No Known Allergies    Intolerances        Flatus: [ ] YES [ ] NO             Bowel Movement: [ ] YES [ ] NO  Pain (0-10):            Pain Control Adequate: [ ] YES [ ] NO  Nausea: [ ] YES [ ] NO            Vomiting: [ ] YES [ ] NO  Diarrhea: [ ] YES [ ] NO         Constipation: [ ] YES [ ] NO     Chest Pain: [ ] YES [ ] NO    SOB:  [ ] YES [ ] NO    Vital Signs Last 24 Hrs  T(C): 37.4, Max: 38.7 (05-09 @ 10:11)  T(F): 99.4, Max: 101.6 (05-09 @ 10:11)  HR: 79 (79 - 107)  BP: 151/42 (110/90 - 177/59)  BP(mean): 71 (65 - 94)  RR: 21 (15 - 25)  SpO2: 98% (91% - 98%)    I&O's Summary    I & Os for current day (as of 10 May 2017 08:03)  =============================================  IN: 2222 ml / OUT: 1250 ml / NET: 972 ml      Physical Exam:  General: NAD, resting comfortably  Pulmonary: normal resp effort, CTA-B  Cardiovascular: NSR  Abdominal: soft, NT/ND  Extremities: WWP, normal strength  Neuro: A/O x 3, CNs II-XII grossly intact, normal motor/sensation, no focal deficits  Pulses:   Right:                                                                          Left:    R foot as previously noted but warmer today and with cap refill less than 2 seconds; however, still impaired motor function and foot blanches with elevation suggestive of poor perfusion    There is occlusive plaque at distal CFA obstructing flow into profunda and SFA occlusion in mid SFA with disease to AK pop impairing distal perfusion    LABS:                        8.0    8.9   )-----------( 273      ( 10 May 2017 05:00 )             25.7     05-10    143  |  113<H>  |  119<H>  ----------------------------<  89  5.0   |  18<L>  |  2.71<H>    Ca    7.8<L>      10 May 2017 05:00            CAPILLARY BLOOD GLUCOSE      RADIOLOGY & ADDITIONAL TESTS:

## 2017-05-10 NOTE — PROGRESS NOTE ADULT - ASSESSMENT
severe PAD.  anticipate endarterectomy of CFA plaque tomorrow in an attempt of limb preservation.  may require ambuation.  high medical risk for urgent high risk vascular proceedure.  will optimize hemodynamics.  transfuse PRBCs 2 units w/ Lasix 20mg IV in between both units.      05/08 angiogram + angioplasy.  stenting of right common iliac + external iliac arteries.  statin.  VSx following.    chronic right LE stasis ulcers, dermatitis w/ cellulitis.  low-grade temps.  c/w cefepime.  ID following.    GIB.  acute blood loss anemia.  s/p 5 units PRBCs.  +/- EGD if respiratory status stabilizes.  c/w PPI gtt.  GI following.    ARYA upon CKD.  prerenal/dehydration.  Cr close to baseline.  hold IVFs during transfusion, resume afterward.  Nephrology following.    multiple DVT RUE/RLL.  s/p IVCF.    d/w VSx, Cardiology and Nephrology.

## 2017-05-10 NOTE — PROGRESS NOTE ADULT - ASSESSMENT
R leg wounds from venous stasis and impaired arterial perfusion in setting of extremely compromised clinical state (failure, respiratory failure, improving renal failure, GI bleed)    I believe he will require further intervention to have a possibility for limb preservation.  Discussed with patient and daughter at bedside.  They are aware that amputation may be necessary

## 2017-05-10 NOTE — PROGRESS NOTE ADULT - SUBJECTIVE AND OBJECTIVE BOX
Patient is a 82y old  Male who presents with a chief complaint of Worsening leg pain, anemia, ARYA sent from Rehab.      HPI:  82 year old male with history of CAD s/p stents (LAD, RCA bare metal around ), A.Fib not on anticoagulation due to GI bleeding, Hypertension, COPD on home O2 2L, SHAYY on nocturnal BIPAP, ex-smoker (smoked 1ppd X 50 years, quit 22 years ago),  Chronic diastolic CHF, Hyperlipidemia, PVD, Iron deficiency anemia, Chronic back pain, Gout, BPH, CKD III with baseline Cr 2, recently admitted to  in  with anemia of GI bleeding, RLE and RUE DVTs, received pRBCs and IVC filter discharged to rehab with aspirin was sent to  ER on 17 for worsening anemia with Hb 6, worsening leg pain from ulcers .  S/P peripheral angiogram and stenting of the right iliac artery.  Residual disease in common/SFA and popliteal.  On preperation for endarterectomy on the common bifurcation tomorrow.  Feeling better, improved pain on the right.  He denies any CP or SOB.         PAST MEDICAL & SURGICAL HISTORY:  Sepsis, due to unspecified organism: 2/2 poorly healing wounds b/l  BPH (benign prostatic hypertrophy)  Hyperlipemia  Coronary artery disease  Hypertension  Dyspepsia: On moderate exertion.  Sleep apnea, obstructive: Requires home 02 therapy, and treatment with BIPAP  Atelectasis  Pleural effusion, bilateral  Respiratory failure  Peripheral edema  CRI (chronic renal insufficiency)  Gout  CRF (chronic renal failure)  Benign prostatic hypertrophy  Spinal stenosis  Hypercholesterolemia  GERD (gastroesophageal reflux disease)  CAD (coronary artery disease)  Hypertension  S/P angioplasty with stent  Cataract of left eye  Prostate: Surgery green light procedure.  S/P rotator cuff surgery: Right  S/P angioplasty  Rotator cuff tear, right: repair      MEDICATIONS  (STANDING):  pantoprazole Infusion 8mG/Hr IV Continuous <Continuous>  buDESOnide   0.5 milliGRAM(s) Respule 0.5milliGRAM(s) Inhalation two times a day  doxazosin 8milliGRAM(s) Oral at bedtime  isosorbide   mononitrate ER Tablet (IMDUR) 120milliGRAM(s) Oral daily  diltiazem   CD 120milliGRAM(s) Oral daily  gabapentin 600milliGRAM(s) Oral at bedtime  gabapentin 300milliGRAM(s) Oral daily  allopurinol 100milliGRAM(s) Oral daily  fenofibrate Tablet 145milliGRAM(s) Oral daily  simvastatin 10milliGRAM(s) Oral at bedtime  pramipexole 0.125milliGRAM(s) Oral three times a day  ALBUTerol    90 MICROgram(s) HFA Inhaler 2Puff(s) Inhalation every 6 hours  ammonium lactate 12% Lotion 1Application(s) Topical two times a day  hydrALAZINE 150milliGRAM(s) Oral two times a day  fluticasone propionate 50 MICROgram(s)/spray Nasal Spray 1Spray(s) Alternating Nostrils two times a day  metoprolol 12.5milliGRAM(s) Oral two times a day  cefepime  IVPB 1000milliGRAM(s) IV Intermittent daily    MEDICATIONS  (PRN):  acetaminophen   Tablet 650milliGRAM(s) Oral every 6 hours PRN For Temp greater than 38 C (100.4 F)  traMADol 100milliGRAM(s) Oral every 6 hours PRN Moderate Pain (4 - 6)  aluminum hydroxide/magnesium hydroxide/simethicone Suspension 30milliLiter(s) Oral every 6 hours PRN Dyspepsia  ondansetron Injectable 4milliGRAM(s) IV Push every 6 hours PRN Nausea      FAMILY HISTORY:  No pertinent family history in first degree relatives      SOCIAL HISTORY:  non smoker, no alcohol use      REVIEW OF SYSTEMS:  Unable to obtain as pt is confused        Vital Signs Last 24 Hrs  T(C): 37.9, Max: 38.2 (05-04 @ 23:00)  T(F): 100.3, Max: 100.8 (05-04 @ 23:00)  HR: 97 (89 - 115)  BP: 122/36 (106/37 - 156/41)  BP(mean): 59 (47 - 72)  RR: 19 (15 - 25)  SpO2: 94% (89% - 100%)    PHYSICAL EXAM-    Constitutional: ill looking pt with confusion.  Head: Head is normocephalic and atraumatic.      Neck: The patient's neck is supple without enlargement, has no palpable thyromegaly nor thyroid nodules and has no jugular venous distention. No audible carotid bruits. There are strong carotid pulses bilaterally. No JVD.     Cardiovascular: Regular rate and rhythm without S3, S4. No murmurs or rubs are appreciated.      Respiratory: Breath sounds are normal. No rales. No wheezing.    Abdomen: Soft, nontender, nondistended with positive bowel sounds.      Extremity: both extremities are wrapped currently foul smelling and L foot is pale.    Neurologic: confused     Skin: No rash, no obvious lesions noted.      Psychiatric: confused      INTERPRETATION OF TELEMETRY: sinus tachycardia    ECG:sinus tachycardia with normal axis, ST depression in V5-7.    I&O's Detail    I & Os for current day (as of 05 May 2017 08:04)  =============================================  IN:    Packed Red Blood Cells: 647 ml    Total IN: 647 ml  ---------------------------------------------  OUT:    Voided: 400 ml    Total OUT: 400 ml  ---------------------------------------------  Total NET: 247 ml      LABS:                        6.8    14.1  )-----------( 375      ( 05 May 2017 05:10 )             20.1     05-05    149<H>  |  118<H>  |  143<H>  ----------------------------<  109<H>  4.9   |  21<L>  |  3.68<H>    Ca    8.3<L>      05 May 2017 05:10  Mg     2.8     05-05    TPro  6.6  /  Alb  2.4<L>  /  TBili  0.2  /  DBili  x   /  AST  20  /  ALT  15  /  AlkPhos  72  05-04    CARDIAC MARKERS ( 04 May 2017 12:54 )  <0.015 ng/mL / x     / 99 U/L / x     / x          PT/INR - ( 04 May 2017 12:54 )   PT: 13.6 sec;   INR: 1.25 ratio         PTT - ( 04 May 2017 12:54 )  PTT:38.8 sec  Urinalysis Basic - ( 04 May 2017 18:00 )    Color: Yellow / Appearance: Clear / S.020 / pH: x  Gluc: x / Ketone: Negative  / Bili: Small / Urobili: Negative mg/dL   Blood: x / Protein: 15 mg/dL / Nitrite: Negative   Leuk Esterase: Trace / RBC: 0-2 /HPF / WBC 0-2   Sq Epi: x / Non Sq Epi: Occasional / Bacteria: Occasional      I&O's Summary    I & Os for current day (as of 05 May 2017 08:04)  =============================================  IN: 647 ml / OUT: 400 ml / NET: 247 ml    BNP  RADIOLOGY & ADDITIONAL STUDIES:

## 2017-05-10 NOTE — PROGRESS NOTE ADULT - ASSESSMENT
Severe PVD , from  cardiology standpoint he had longstanding history of coronary artery disease. He underwent percutaneous coronary intervention and received a stent in the left anterior descending artery in 2002 and most recently, on September 28, 2016, he underwent rotational atherectomy and received an Integrity bare metal stent in the proximal and mid RCA.   S/P PTA of the iliac on the right, awaiting endarterectomy of common femoral tomorrow.  Moderate to high risk based on overall status and past medical history but optimized at present.  Continue current medical therapy.

## 2017-05-10 NOTE — PROGRESS NOTE ADULT - SUBJECTIVE AND OBJECTIVE BOX
NEPHROLOGY INTERVAL HPI/OVERNIGHT EVENTS:  no new complaints.  po intake is moderate.    planned for intervention of the CFA tomorrow with dr dugan  1 unit of prbc transfusion       MEDICATIONS  (STANDING):  pantoprazole Infusion 8mG/Hr IV Continuous <Continuous>  buDESOnide   0.5 milliGRAM(s) Respule 0.5milliGRAM(s) Inhalation two times a day  doxazosin 8milliGRAM(s) Oral at bedtime  isosorbide   mononitrate ER Tablet (IMDUR) 120milliGRAM(s) Oral daily  diltiazem   CD 120milliGRAM(s) Oral daily  gabapentin 600milliGRAM(s) Oral at bedtime  gabapentin 300milliGRAM(s) Oral daily  allopurinol 100milliGRAM(s) Oral daily  fenofibrate Tablet 145milliGRAM(s) Oral daily  simvastatin 10milliGRAM(s) Oral at bedtime  pramipexole 0.125milliGRAM(s) Oral three times a day  ALBUTerol    90 MICROgram(s) HFA Inhaler 2Puff(s) Inhalation every 6 hours  ammonium lactate 12% Lotion 1Application(s) Topical two times a day  hydrALAZINE 150milliGRAM(s) Oral two times a day  fluticasone propionate 50 MICROgram(s)/spray Nasal Spray 1Spray(s) Alternating Nostrils two times a day  metoprolol 12.5milliGRAM(s) Oral two times a day  cefepime  IVPB 1000milliGRAM(s) IV Intermittent daily  lactobacillus acidophilus 1Tablet(s) Oral daily  acetylcysteine  Oral Solution 1200milliGRAM(s) Oral every 12 hours  sodium chloride 0.45%. 1000milliLiter(s) IV Continuous <Continuous>    MEDICATIONS  (PRN):  traMADol 100milliGRAM(s) Oral every 6 hours PRN Moderate Pain (4 - 6)  aluminum hydroxide/magnesium hydroxide/simethicone Suspension 30milliLiter(s) Oral every 6 hours PRN Dyspepsia  ondansetron Injectable 4milliGRAM(s) IV Push every 6 hours PRN Nausea  oxyCODONE  5 mG/acetaminophen 325 mG 1Tablet(s) Oral every 4 hours PRN Severe Pain (7 - 10)      Allergies    No Known Allergies    Intolerances        I&O's Detail    I & Os for current day (as of 10 May 2017 12:13)  =============================================  IN:    sodium chloride 0.45%.: 1776 ml    Oral Fluid: 240 ml    pantoprazole Infusion: 206 ml    Total IN: 2222 ml  ---------------------------------------------  OUT:    Ureteral Catheter: 1250 ml    Total OUT: 1250 ml  ---------------------------------------------  Total NET: 972 ml      Vital Signs Last 24 Hrs  T(C): 37.4, Max: 38.1 ( @ 18:35)  T(F): 99.4, Max: 100.6 ( @ 18:35)  HR: 92 (73 - 107)  BP: 150/39 (110/90 - 175/50)  BP(mean): 64 (64 - 94)  RR: 15 (15 - 26)  SpO2: 95% (91% - 98%)  Daily     Daily Weight in k.3 (10 May 2017 04:21)    PHYSICAL EXAM:  General: alert. awake Ox3  HEENT: MMM  CV: s1s2 rrr  LUNGS: B/L CTA  EXT: no edema    LABS:                        8.0    8.9   )-----------( 273      ( 10 May 2017 05:00 )             25.7     05-10    143  |  113<H>  |  119<H>  ----------------------------<  89  5.0   |  18<L>  |  2.71<H>    Ca    7.8<L>      10 May 2017 05:00

## 2017-05-10 NOTE — PROGRESS NOTE ADULT - SUBJECTIVE AND OBJECTIVE BOX
Patient is a 82y old  Male who presents with a chief complaint of Worsening leg pain, anemia, ARYA sent from Rehab (04 May 2017 15:56)      HPI:  82 year old male with history of CAD s/p stents (LAD, RCA bare metal around 9/16), A.Fib not on anticoagulation due to GI bleeding, Hypertension, COPD on home O2 2L, SHAYY on nocturnal BIPAP, ex-smoker (smoked 1ppd X 50 years, quit 22 years ago),  Chronic diastolic CHF, Hyperlipidemia, PVD, Iron deficiency anemia, Chronic back pain, Gout, BPH, CKD III with baseline Cr 2, recently admitted to  in 4/17 with anemia of GI bleeding, RLE and RUE DVTs, received pRBCs and IVC filter discharged to rehab with aspirin was sent to  ER on 5/4/17 for worsening anemia with Hb 6, worsening leg pain from ulcers and worsening renal function Cr 3.99.    Pt seen bed side.  Not well oriented currently after receiving Dilaudid for pain in ER.  As per daughters, pt was walking with a walker at rehab and was doing better.  Pt was found to have tarry black stools in ER, guaic positive. (04 May 2017 15:56)    patient is pleasant. His  I will be confused. He recognizes me this morning. However, his mental status waxes and wanes. He complains of some upper abdominal pain with burning sensation. He tolerated his diet. He denies any dysphagia.  upper abdominal burning increases after eating.  History is per patient and daughter.    PAST MEDICAL & SURGICAL HISTORY:  Sepsis, due to unspecified organism: 2/2 poorly healing wounds b/l  BPH (benign prostatic hypertrophy)  Hyperlipemia  Coronary artery disease  Hypertension  Dyspepsia: On moderate exertion.  Sleep apnea, obstructive: Requires home 02 therapy, and treatment with BIPAP  Atelectasis  Pleural effusion, bilateral  Respiratory failure  Peripheral edema  CRI (chronic renal insufficiency)  Gout  CRF (chronic renal failure)  Benign prostatic hypertrophy  Spinal stenosis  Hypercholesterolemia  GERD (gastroesophageal reflux disease)  CAD (coronary artery disease)  Hypertension  S/P angioplasty with stent  Cataract of left eye  Prostate: Surgery green light procedure.  S/P rotator cuff surgery: Right  S/P angioplasty  Rotator cuff tear, right: repair      MEDICATIONS  (STANDING):  pantoprazole Infusion 8mG/Hr IV Continuous <Continuous>  buDESOnide   0.5 milliGRAM(s) Respule 0.5milliGRAM(s) Inhalation two times a day  doxazosin 8milliGRAM(s) Oral at bedtime  isosorbide   mononitrate ER Tablet (IMDUR) 120milliGRAM(s) Oral daily  diltiazem   CD 120milliGRAM(s) Oral daily  gabapentin 600milliGRAM(s) Oral at bedtime  gabapentin 300milliGRAM(s) Oral daily  allopurinol 100milliGRAM(s) Oral daily  fenofibrate Tablet 145milliGRAM(s) Oral daily  simvastatin 10milliGRAM(s) Oral at bedtime  pramipexole 0.125milliGRAM(s) Oral three times a day  ALBUTerol    90 MICROgram(s) HFA Inhaler 2Puff(s) Inhalation every 6 hours  ammonium lactate 12% Lotion 1Application(s) Topical two times a day  hydrALAZINE 150milliGRAM(s) Oral two times a day  fluticasone propionate 50 MICROgram(s)/spray Nasal Spray 1Spray(s) Alternating Nostrils two times a day  metoprolol 12.5milliGRAM(s) Oral two times a day  cefepime  IVPB 1000milliGRAM(s) IV Intermittent daily  lactobacillus acidophilus 1Tablet(s) Oral daily  acetylcysteine  Oral Solution 1200milliGRAM(s) Oral every 12 hours    MEDICATIONS  (PRN):  traMADol 100milliGRAM(s) Oral every 6 hours PRN Moderate Pain (4 - 6)  aluminum hydroxide/magnesium hydroxide/simethicone Suspension 30milliLiter(s) Oral every 6 hours PRN Dyspepsia  ondansetron Injectable 4milliGRAM(s) IV Push every 6 hours PRN Nausea  oxyCODONE  5 mG/acetaminophen 325 mG 1Tablet(s) Oral every 4 hours PRN Severe Pain (7 - 10)      Allergies    No Known Allergies    Intolerances        SOCIAL HISTORY:unchanged    FAMILY HISTORY:  No pertinent family history in first degree relatives      REVIEW OF SYSTEMS:    CONSTITUTIONAL: No weakness, fevers or chills  EYES/ENT: No visual changes;  No vertigo or throat pain   NECK: No pain or stiffness  RESPIRATORY: No cough, wheezing, hemoptysis; No shortness of breath  CARDIOVASCULAR: No chest pain or palpitations  GENITOURINARY: No dysuria, frequency or hematuria  NEUROLOGICAL: No numbness or weakness  SKIN: No itching, burning, rashes, or lesions   All other review of systems is negative unless indicated above.    Vital Signs Last 24 Hrs  T(C): 37.3, Max: 38.1 (05-09 @ 22:00)  T(F): 99.1, Max: 100.6 (05-09 @ 22:00)  HR: 92 (73 - 106)  BP: 159/48 (137/52 - 175/50)  BP(mean): 73 (63 - 80)  RR: 17 (15 - 26)  SpO2: 97% (91% - 98%)    PHYSICAL EXAM:    Constitutional: NAD, well-developed  HEENT: EOMI, throat clear  Neck: No LAD, supple  Respiratory: CTA and P  Cardiovascular: S1 and S2, RRR, no M  Gastrointestinal: BS+, soft, NT/ND, neg HSM,  Extremities: positive peripheral edema, neg clubing, cyanosis, right  lower extremity ulcers    Neurological: A/O x 2, no focal deficits  Psychiatric: Normal mood, normal affect  Skin: No rashes    LABS:  CBC Full  -  ( 10 May 2017 05:00 )  WBC Count : 8.9 K/uL  Hemoglobin : 8.0 g/dL  Hematocrit : 25.7 %  Platelet Count - Automated : 273 K/uL  Mean Cell Volume : 90.6 fl  Mean Cell Hemoglobin : 28.0 pg  Mean Cell Hemoglobin Concentration : 31.0 gm/dL  Auto Neutrophil # : x  Auto Lymphocyte # : x  Auto Monocyte # : x  Auto Eosinophil # : x  Auto Basophil # : x  Auto Neutrophil % : x  Auto Lymphocyte % : x  Auto Monocyte % : x  Auto Eosinophil % : x  Auto Basophil % : x    05-10    143  |  113<H>  |  119<H>  ----------------------------<  89  5.0   |  18<L>  |  2.71<H>    Ca    7.8<L>      10 May 2017 05:00              RADIOLOGY & ADDITIONAL STUDIES:  EXAM:  CT ABDOMEN AND PELVIS OC                            PROCEDURE DATE:  05/05/2017        INTERPRETATION:  CT ABDOMEN AND PELVIS OC    HISTORY:  abd pain, distension, do stat, pt must contrast    Technique: CT of the abdomen and pelvis is performed with oral without   intravenous contrast. Axial images are supplemented with coronal and   sagittal reformations. This study was performed using automatic exposure   control (radiation dose reduction software) to obtain a diagnostic image   quality scan with patient dose as low as reasonably achievable.    Contrast:     Oral contrast only    Comparison: CT abdomen and pelvis 6/13/2016    Findings:  LIVER: Normal.  SPLEEN: Normal.  PANCREAS: Normal.  GALLBLADDER/BILIARY TREE: Nondilated. Gallstone is present.  ADRENALS: Normal.  KIDNEYS: No calcification, hydronephrosis, or soft tissue attenuating   renal mass.  LYMPHADENOPATHY/RETROPERITONEUM: No adenopathy.  VASCULATURE: Extensive aortoiliac vascular calcification without   aneurysm. Infrarenal vena cava filter in place.  BOWEL: No bowel related abnormality. Specifically, no bowel obstruction.  PELVIC VISCERA: Calcified BPH in the prostate is noted.  PELVIC LYMPH NODES: No pelvic adenopathy.  PERITONEUM/ABDOMINAL WALL: No free air orascites.  SKELETAL: No acute bony abnormality.  LUNG BASES: Clear.    IMPRESSION:     Preponderance of abdominal visceral adipose tissue without evidence for   obstruction, mass, or ascites.              KENDRA ERAZO   This document has been electronically signed. May  5 2017 11:26AM

## 2017-05-10 NOTE — PROGRESS NOTE ADULT - ASSESSMENT
# ARYA due to pre-renal azothemia with CKD stage 4 at baseline ( 2.0-2.5)  # Anemia due to acute blood loss anemia  # Sepsis? due to LE ulcers  # Multiple DVT on RUE/RLE with s/p IVC fileter  # HTN    PLAN  - moniter off diuretics ( only inbetween blood transfusion) and fu response to the PRBC transfusion  - continue with current anti HTN  - strick I/O   - abx as per ID, pt given one dose of vanc in ER  family updated at bedside    5/6 MK  - ARYA/CKD stage 4, non oliguric with slow recovery indicative of likely compnent of ATN. Agree with IVF composition to d5w and will moniter with advancing of the diet and uop.  - Severe PVD with ischmic rt foot.  ? cellulitis    abx as per ID.  jerald dugan and family regarding timing of angiogram and possibiltuy of HD discussed with family. Will moniter the progress over th weekend    5/7 SY  --ARYA/CKD   Creat slowly trending down.. Continue to monitor.  Continue to hold diuretics.  Continue G3F--Lywbvlelqkhcs slowly improving.  --PVD  Will need decide on timing of angiogram, though this may lead to Dialysis at least temporarily at best.  Continue abtx.    5/7 MK  - ARYA/ CKD stage 4, improving....   likely pre-renal   - PVD, planned for LE angiogram today with dr dugan, mucomyst started. continue with ivf, will change to 1/2 ns.  jerald pascual at bedside, again aware of the risk for HD    5/9 SY  --ARAY/CKD    Creat now stabilized.  Continue to monitor for now post angiogram.  Continue current IVF for another day.  --PVD  Post Stent placement in Right common and external iliac arteries.  Continue to monitor wounds.  --Electrolytes improved and stable.    5/10 MK  - ARYA/CKd stage 4 at baseline, for now hold diureitcs  - PVD for repeat intervention tomorrow, will continue with mucomyst and IVf at current rate  - Cellulitis: abx as per ID  - Anemia : for now symptomatic treatment.  transfusion of 1 unit of PRBC   jerald daughter and RN # ARYA due to pre-renal azothemia with CKD stage 4 at baseline ( 2.0-2.5)  # Anemia due to acute blood loss anemia  # Sepsis? due to LE ulcers  # Multiple DVT on RUE/RLE with s/p IVC fileter  # HTN    PLAN  - moniter off diuretics ( only inbetween blood transfusion) and fu response to the PRBC transfusion  - continue with current anti HTN  - strick I/O   - abx as per ID, pt given one dose of vanc in ER  family updated at bedside    5/6 MK  - ARYA/CKD stage 4, non oliguric with slow recovery indicative of likely compnent of ATN. Agree with IVF composition to d5w and will moniter with advancing of the diet and uop.  - Severe PVD with ischmic rt foot.  ? cellulitis    abx as per ID.  jerald dugan and family regarding timing of angiogram and possibiltuy of HD discussed with family. Will moniter the progress over th weekend    5/7 SY  --ARYA/CKD   Creat slowly trending down.. Continue to monitor.  Continue to hold diuretics.  Continue C4Y--Eauskkiddkdmh slowly improving.  --PVD  Will need decide on timing of angiogram, though this may lead to Dialysis at least temporarily at best.  Continue abtx.    5/7 MK  - ARYA/ CKD stage 4, improving....   likely pre-renal   - PVD, planned for LE angiogram today with dr dugan, mucomyst started. continue with ivf, will change to 1/2 ns.  jerald pascual at bedside, again aware of the risk for HD    5/9 SY  --ARYA/CKD    Creat now stabilized.  Continue to monitor for now post angiogram.  Continue current IVF for another day.  --PVD  Post Stent placement in Right common and external iliac arteries.  Continue to monitor wounds.  --Electrolytes improved and stable.    5/10 MK  - ARYA/CKd stage 4 at baseline, for now hold diureitcs  - PVD for repeat intervention tomorrow, will continue with mucomyst and IVf at current rate  - Cellulitis: abx as per ID  - Anemia : for now symptomatic treatment.  transfusion of 2 unit of PRBC with lasix inbetween.  IVF on hold and restart when his NPO status starts  dw dr damico, daughter and RN

## 2017-05-10 NOTE — PROGRESS NOTE ADULT - SUBJECTIVE AND OBJECTIVE BOX
TD  05/10:  IS.  awake but confused.  Tm 100.6.      MEDICATIONS  (STANDING):  pantoprazole Infusion 8mG/Hr IV Continuous <Continuous>  buDESOnide   0.5 milliGRAM(s) Respule 0.5milliGRAM(s) Inhalation two times a day  doxazosin 8milliGRAM(s) Oral at bedtime  isosorbide   mononitrate ER Tablet (IMDUR) 120milliGRAM(s) Oral daily  diltiazem   CD 120milliGRAM(s) Oral daily  gabapentin 600milliGRAM(s) Oral at bedtime  gabapentin 300milliGRAM(s) Oral daily  allopurinol 100milliGRAM(s) Oral daily  fenofibrate Tablet 145milliGRAM(s) Oral daily  simvastatin 10milliGRAM(s) Oral at bedtime  pramipexole 0.125milliGRAM(s) Oral three times a day  ALBUTerol    90 MICROgram(s) HFA Inhaler 2Puff(s) Inhalation every 6 hours  ammonium lactate 12% Lotion 1Application(s) Topical two times a day  hydrALAZINE 150milliGRAM(s) Oral two times a day  fluticasone propionate 50 MICROgram(s)/spray Nasal Spray 1Spray(s) Alternating Nostrils two times a day  metoprolol 12.5milliGRAM(s) Oral two times a day  cefepime  IVPB 1000milliGRAM(s) IV Intermittent daily  lactobacillus acidophilus 1Tablet(s) Oral daily  acetylcysteine  Oral Solution 1200milliGRAM(s) Oral every 12 hours  sodium chloride 0.45%. 1000milliLiter(s) IV Continuous <Continuous>  acetaminophen   Tablet 650milliGRAM(s) Oral once  furosemide   Injectable 20milliGRAM(s) IV Push once    MEDICATIONS  (PRN):  traMADol 100milliGRAM(s) Oral every 6 hours PRN Moderate Pain (4 - 6)  aluminum hydroxide/magnesium hydroxide/simethicone Suspension 30milliLiter(s) Oral every 6 hours PRN Dyspepsia  ondansetron Injectable 4milliGRAM(s) IV Push every 6 hours PRN Nausea  oxyCODONE  5 mG/acetaminophen 325 mG 1Tablet(s) Oral every 4 hours PRN Severe Pain (7 - 10)    Vital Signs Last 24 Hrs  T(C): 37.4, Max: 38.1 (05-09 @ 18:35)  T(F): 99.4, Max: 100.6 (05-09 @ 18:35)  HR: 92 (73 - 107)  BP: 150/39 (110/90 - 175/50)  BP(mean): 64 (64 - 94)  RR: 15 (15 - 26)  SpO2: 95% (91% - 98%)    elderly wm confused put NAD.  lungs clear.  heart regular.  no MRG.  soft.  obsese.  NTND.  (+) b/l dressing clean and dry.                        8.0    8.9   )-----------( 273      ( 10 May 2017 05:00 )             25.7       05-10    143  |  113<H>  |  119<H>  ----------------------------<  89  5.0   |  18<L>  |  2.71<H>    Ca    7.8<L>      10 May 2017 05:00

## 2017-05-10 NOTE — PROGRESS NOTE ADULT - SUBJECTIVE AND OBJECTIVE BOX
discussed with patient and family my belief that thromboendarterectomy reperfusing profunda will give him a chance for limb salvage acknowledging his high risk    scheduled for tomorrow

## 2017-05-10 NOTE — PROGRESS NOTE ADULT - ASSESSMENT
nemia, likely multifactorial  I suspect that there is a component of GI bleeding, appears stabilized on PPI gtt and hct stable  He has received 5 units of packed red blood cells and getting 5th one now    will delay EGD due to low o2 sat, DW family again        pt now more alert and awake, but is variable  per D hallucinates at times      I had an extensive discussion with the patient's daughter. Secondary to his multiple comorbid disorders, at this time, we will delay endoscopic evaluation. It is likely that he would require intubation and may have respiratory compromise.    We'll continue Protonix drip, possibly change to Protonix twice a day tomorrow    Serial H&H'    tolerating diet          Lower extremity DVT, status post IVC filter    Sepsis likely secondary to lower extremity cellulitis and antibiotics will be continued.  Infectious disease note was appreciated.    Advanced COPD and obstructive sleep apnea reviewed.

## 2017-05-11 ENCOUNTER — RESULT REVIEW (OUTPATIENT)
Age: 82
End: 2017-05-11

## 2017-05-11 LAB
ANION GAP SERPL CALC-SCNC: 11 MMOL/L — SIGNIFICANT CHANGE UP (ref 5–17)
ANION GAP SERPL CALC-SCNC: 12 MMOL/L — SIGNIFICANT CHANGE UP (ref 5–17)
BUN SERPL-MCNC: 112 MG/DL — HIGH (ref 7–23)
BUN SERPL-MCNC: 118 MG/DL — HIGH (ref 7–23)
CALCIUM SERPL-MCNC: 7.6 MG/DL — LOW (ref 8.5–10.1)
CALCIUM SERPL-MCNC: 8 MG/DL — LOW (ref 8.5–10.1)
CHLORIDE SERPL-SCNC: 114 MMOL/L — HIGH (ref 96–108)
CHLORIDE SERPL-SCNC: 116 MMOL/L — HIGH (ref 96–108)
CO2 SERPL-SCNC: 18 MMOL/L — LOW (ref 22–31)
CO2 SERPL-SCNC: 19 MMOL/L — LOW (ref 22–31)
CREAT SERPL-MCNC: 2.4 MG/DL — HIGH (ref 0.5–1.3)
CREAT SERPL-MCNC: 2.52 MG/DL — HIGH (ref 0.5–1.3)
GLUCOSE SERPL-MCNC: 105 MG/DL — HIGH (ref 70–99)
GLUCOSE SERPL-MCNC: 116 MG/DL — HIGH (ref 70–99)
HCT VFR BLD CALC: 29.3 % — LOW (ref 39–50)
HCT VFR BLD CALC: 33.7 % — LOW (ref 39–50)
HGB BLD-MCNC: 10.8 G/DL — LOW (ref 13–17)
HGB BLD-MCNC: 9.4 G/DL — LOW (ref 13–17)
MCHC RBC-ENTMCNC: 29 PG — SIGNIFICANT CHANGE UP (ref 27–34)
MCHC RBC-ENTMCNC: 29.1 PG — SIGNIFICANT CHANGE UP (ref 27–34)
MCHC RBC-ENTMCNC: 32.1 GM/DL — SIGNIFICANT CHANGE UP (ref 32–36)
MCHC RBC-ENTMCNC: 32.1 GM/DL — SIGNIFICANT CHANGE UP (ref 32–36)
MCV RBC AUTO: 90.4 FL — SIGNIFICANT CHANGE UP (ref 80–100)
MCV RBC AUTO: 90.5 FL — SIGNIFICANT CHANGE UP (ref 80–100)
PLATELET # BLD AUTO: 226 K/UL — SIGNIFICANT CHANGE UP (ref 150–400)
PLATELET # BLD AUTO: 231 K/UL — SIGNIFICANT CHANGE UP (ref 150–400)
POTASSIUM SERPL-MCNC: 4.9 MMOL/L — SIGNIFICANT CHANGE UP (ref 3.5–5.3)
POTASSIUM SERPL-MCNC: 5.1 MMOL/L — SIGNIFICANT CHANGE UP (ref 3.5–5.3)
POTASSIUM SERPL-SCNC: 4.9 MMOL/L — SIGNIFICANT CHANGE UP (ref 3.5–5.3)
POTASSIUM SERPL-SCNC: 5.1 MMOL/L — SIGNIFICANT CHANGE UP (ref 3.5–5.3)
RBC # BLD: 3.24 M/UL — LOW (ref 4.2–5.8)
RBC # BLD: 3.72 M/UL — LOW (ref 4.2–5.8)
RBC # FLD: 15.3 % — HIGH (ref 10.3–14.5)
RBC # FLD: 15.3 % — HIGH (ref 10.3–14.5)
SODIUM SERPL-SCNC: 144 MMOL/L — SIGNIFICANT CHANGE UP (ref 135–145)
SODIUM SERPL-SCNC: 146 MMOL/L — HIGH (ref 135–145)
TROPONIN I SERPL-MCNC: 0.03 NG/ML — SIGNIFICANT CHANGE UP (ref 0.01–0.04)
WBC # BLD: 7.7 K/UL — SIGNIFICANT CHANGE UP (ref 3.8–10.5)
WBC # BLD: 8.2 K/UL — SIGNIFICANT CHANGE UP (ref 3.8–10.5)
WBC # FLD AUTO: 7.7 K/UL — SIGNIFICANT CHANGE UP (ref 3.8–10.5)
WBC # FLD AUTO: 8.2 K/UL — SIGNIFICANT CHANGE UP (ref 3.8–10.5)

## 2017-05-11 PROCEDURE — 88304 TISSUE EXAM BY PATHOLOGIST: CPT | Mod: 26

## 2017-05-11 PROCEDURE — 93010 ELECTROCARDIOGRAM REPORT: CPT

## 2017-05-11 PROCEDURE — 88311 DECALCIFY TISSUE: CPT | Mod: 26

## 2017-05-11 RX ORDER — FLUTICASONE PROPIONATE 50 MCG
1 SPRAY, SUSPENSION NASAL
Qty: 0 | Refills: 0 | Status: DISCONTINUED | OUTPATIENT
Start: 2017-05-11 | End: 2017-05-24

## 2017-05-11 RX ORDER — TRAMADOL HYDROCHLORIDE 50 MG/1
100 TABLET ORAL EVERY 6 HOURS
Qty: 0 | Refills: 0 | Status: DISCONTINUED | OUTPATIENT
Start: 2017-05-11 | End: 2017-05-16

## 2017-05-11 RX ORDER — ALBUTEROL 90 UG/1
2 AEROSOL, METERED ORAL EVERY 6 HOURS
Qty: 0 | Refills: 0 | Status: DISCONTINUED | OUTPATIENT
Start: 2017-05-11 | End: 2017-05-24

## 2017-05-11 RX ORDER — DOXAZOSIN MESYLATE 4 MG
8 TABLET ORAL AT BEDTIME
Qty: 0 | Refills: 0 | Status: DISCONTINUED | OUTPATIENT
Start: 2017-05-11 | End: 2017-05-24

## 2017-05-11 RX ORDER — ALLOPURINOL 300 MG
100 TABLET ORAL DAILY
Qty: 0 | Refills: 0 | Status: DISCONTINUED | OUTPATIENT
Start: 2017-05-11 | End: 2017-05-24

## 2017-05-11 RX ORDER — METOPROLOL TARTRATE 50 MG
12.5 TABLET ORAL
Qty: 0 | Refills: 0 | Status: DISCONTINUED | OUTPATIENT
Start: 2017-05-11 | End: 2017-05-24

## 2017-05-11 RX ORDER — FENOFIBRATE,MICRONIZED 130 MG
145 CAPSULE ORAL DAILY
Qty: 0 | Refills: 0 | Status: DISCONTINUED | OUTPATIENT
Start: 2017-05-11 | End: 2017-05-24

## 2017-05-11 RX ORDER — FENTANYL CITRATE 50 UG/ML
25 INJECTION INTRAVENOUS
Qty: 0 | Refills: 0 | Status: DISCONTINUED | OUTPATIENT
Start: 2017-05-11 | End: 2017-05-11

## 2017-05-11 RX ORDER — PANTOPRAZOLE SODIUM 20 MG/1
8 TABLET, DELAYED RELEASE ORAL
Qty: 80 | Refills: 0 | Status: DISCONTINUED | OUTPATIENT
Start: 2017-05-11 | End: 2017-05-12

## 2017-05-11 RX ORDER — LACTOBACILLUS ACIDOPHILUS 100MM CELL
1 CAPSULE ORAL DAILY
Qty: 0 | Refills: 0 | Status: DISCONTINUED | OUTPATIENT
Start: 2017-05-11 | End: 2017-05-24

## 2017-05-11 RX ORDER — HYDRALAZINE HCL 50 MG
150 TABLET ORAL
Qty: 0 | Refills: 0 | Status: DISCONTINUED | OUTPATIENT
Start: 2017-05-11 | End: 2017-05-24

## 2017-05-11 RX ORDER — SODIUM CHLORIDE 9 MG/ML
1000 INJECTION, SOLUTION INTRAVENOUS
Qty: 0 | Refills: 0 | Status: DISCONTINUED | OUTPATIENT
Start: 2017-05-11 | End: 2017-05-12

## 2017-05-11 RX ORDER — SOD,AMMONIUM,POTASSIUM LACTATE
1 CREAM (GRAM) TOPICAL
Qty: 0 | Refills: 0 | Status: DISCONTINUED | OUTPATIENT
Start: 2017-05-11 | End: 2017-05-20

## 2017-05-11 RX ORDER — ISOSORBIDE MONONITRATE 60 MG/1
120 TABLET, EXTENDED RELEASE ORAL DAILY
Qty: 0 | Refills: 0 | Status: DISCONTINUED | OUTPATIENT
Start: 2017-05-11 | End: 2017-05-24

## 2017-05-11 RX ORDER — SIMVASTATIN 20 MG/1
10 TABLET, FILM COATED ORAL AT BEDTIME
Qty: 0 | Refills: 0 | Status: DISCONTINUED | OUTPATIENT
Start: 2017-05-11 | End: 2017-05-24

## 2017-05-11 RX ORDER — PRAMIPEXOLE DIHYDROCHLORIDE 0.12 MG/1
0.12 TABLET ORAL THREE TIMES A DAY
Qty: 0 | Refills: 0 | Status: DISCONTINUED | OUTPATIENT
Start: 2017-05-11 | End: 2017-05-24

## 2017-05-11 RX ORDER — CEFAZOLIN SODIUM 1 G
2000 VIAL (EA) INJECTION EVERY 8 HOURS
Qty: 0 | Refills: 0 | Status: DISCONTINUED | OUTPATIENT
Start: 2017-05-11 | End: 2017-05-11

## 2017-05-11 RX ORDER — ACETYLCYSTEINE 200 MG/ML
1200 VIAL (ML) MISCELLANEOUS EVERY 12 HOURS
Qty: 0 | Refills: 0 | Status: DISCONTINUED | OUTPATIENT
Start: 2017-05-11 | End: 2017-05-12

## 2017-05-11 RX ORDER — ONDANSETRON 8 MG/1
4 TABLET, FILM COATED ORAL ONCE
Qty: 0 | Refills: 0 | Status: DISCONTINUED | OUTPATIENT
Start: 2017-05-11 | End: 2017-05-11

## 2017-05-11 RX ORDER — DILTIAZEM HCL 120 MG
120 CAPSULE, EXT RELEASE 24 HR ORAL DAILY
Qty: 0 | Refills: 0 | Status: DISCONTINUED | OUTPATIENT
Start: 2017-05-11 | End: 2017-05-24

## 2017-05-11 RX ORDER — ONDANSETRON 8 MG/1
4 TABLET, FILM COATED ORAL ONCE
Qty: 0 | Refills: 0 | Status: COMPLETED | OUTPATIENT
Start: 2017-05-11 | End: 2017-05-11

## 2017-05-11 RX ORDER — GABAPENTIN 400 MG/1
300 CAPSULE ORAL DAILY
Qty: 0 | Refills: 0 | Status: DISCONTINUED | OUTPATIENT
Start: 2017-05-11 | End: 2017-05-24

## 2017-05-11 RX ORDER — SODIUM CHLORIDE 9 MG/ML
1000 INJECTION INTRAMUSCULAR; INTRAVENOUS; SUBCUTANEOUS
Qty: 0 | Refills: 0 | Status: DISCONTINUED | OUTPATIENT
Start: 2017-05-11 | End: 2017-05-11

## 2017-05-11 RX ORDER — BUDESONIDE, MICRONIZED 100 %
0.5 POWDER (GRAM) MISCELLANEOUS
Qty: 0 | Refills: 0 | Status: DISCONTINUED | OUTPATIENT
Start: 2017-05-11 | End: 2017-05-24

## 2017-05-11 RX ORDER — ACETAMINOPHEN 500 MG
1000 TABLET ORAL ONCE
Qty: 0 | Refills: 0 | Status: COMPLETED | OUTPATIENT
Start: 2017-05-11 | End: 2017-05-11

## 2017-05-11 RX ORDER — GABAPENTIN 400 MG/1
600 CAPSULE ORAL AT BEDTIME
Qty: 0 | Refills: 0 | Status: DISCONTINUED | OUTPATIENT
Start: 2017-05-11 | End: 2017-05-24

## 2017-05-11 RX ORDER — CEFEPIME 1 G/1
1000 INJECTION, POWDER, FOR SOLUTION INTRAMUSCULAR; INTRAVENOUS EVERY 24 HOURS
Qty: 0 | Refills: 0 | Status: DISCONTINUED | OUTPATIENT
Start: 2017-05-11 | End: 2017-05-16

## 2017-05-11 RX ADMIN — SODIUM CHLORIDE 75 MILLILITER(S): 9 INJECTION, SOLUTION INTRAVENOUS at 14:55

## 2017-05-11 RX ADMIN — Medication 150 MILLIGRAM(S): at 22:22

## 2017-05-11 RX ADMIN — Medication 145 MILLIGRAM(S): at 18:25

## 2017-05-11 RX ADMIN — PRAMIPEXOLE DIHYDROCHLORIDE 0.12 MILLIGRAM(S): 0.12 TABLET ORAL at 06:19

## 2017-05-11 RX ADMIN — Medication 0.5 MILLIGRAM(S): at 20:50

## 2017-05-11 RX ADMIN — Medication 12.5 MILLIGRAM(S): at 06:20

## 2017-05-11 RX ADMIN — PANTOPRAZOLE SODIUM 10 MG/HR: 20 TABLET, DELAYED RELEASE ORAL at 22:26

## 2017-05-11 RX ADMIN — SIMVASTATIN 10 MILLIGRAM(S): 20 TABLET, FILM COATED ORAL at 22:23

## 2017-05-11 RX ADMIN — Medication 1 TABLET(S): at 18:26

## 2017-05-11 RX ADMIN — Medication 1 SPRAY(S): at 18:46

## 2017-05-11 RX ADMIN — Medication 400 MILLIGRAM(S): at 13:19

## 2017-05-11 RX ADMIN — GABAPENTIN 600 MILLIGRAM(S): 400 CAPSULE ORAL at 22:23

## 2017-05-11 RX ADMIN — Medication 150 MILLIGRAM(S): at 06:20

## 2017-05-11 RX ADMIN — Medication 12.5 MILLIGRAM(S): at 22:23

## 2017-05-11 RX ADMIN — Medication 120 MILLIGRAM(S): at 18:27

## 2017-05-11 RX ADMIN — CEFEPIME 100 MILLIGRAM(S): 1 INJECTION, POWDER, FOR SOLUTION INTRAMUSCULAR; INTRAVENOUS at 18:25

## 2017-05-11 RX ADMIN — Medication 1200 MILLIGRAM(S): at 18:46

## 2017-05-11 RX ADMIN — Medication 1 SPRAY(S): at 06:19

## 2017-05-11 RX ADMIN — PANTOPRAZOLE SODIUM 10 MG/HR: 20 TABLET, DELAYED RELEASE ORAL at 06:18

## 2017-05-11 RX ADMIN — PRAMIPEXOLE DIHYDROCHLORIDE 0.12 MILLIGRAM(S): 0.12 TABLET ORAL at 22:23

## 2017-05-11 RX ADMIN — ALBUTEROL 2 PUFF(S): 90 AEROSOL, METERED ORAL at 21:00

## 2017-05-11 RX ADMIN — Medication 8 MILLIGRAM(S): at 22:23

## 2017-05-11 RX ADMIN — ISOSORBIDE MONONITRATE 120 MILLIGRAM(S): 60 TABLET, EXTENDED RELEASE ORAL at 18:26

## 2017-05-11 RX ADMIN — SODIUM CHLORIDE 75 MILLILITER(S): 9 INJECTION INTRAMUSCULAR; INTRAVENOUS; SUBCUTANEOUS at 13:10

## 2017-05-11 RX ADMIN — Medication 100 MILLIGRAM(S): at 18:26

## 2017-05-11 RX ADMIN — TRAMADOL HYDROCHLORIDE 100 MILLIGRAM(S): 50 TABLET ORAL at 22:44

## 2017-05-11 RX ADMIN — PANTOPRAZOLE SODIUM 10 MG/HR: 20 TABLET, DELAYED RELEASE ORAL at 15:00

## 2017-05-11 NOTE — PROGRESS NOTE ADULT - SUBJECTIVE AND OBJECTIVE BOX
TD  05/11:  IS.  awake but confused.  Tm 100.6.      MEDICATIONS  (STANDING):  pantoprazole Infusion 8mG/Hr IV Continuous <Continuous>  buDESOnide   0.5 milliGRAM(s) Respule 0.5milliGRAM(s) Inhalation two times a day  doxazosin 8milliGRAM(s) Oral at bedtime  isosorbide   mononitrate ER Tablet (IMDUR) 120milliGRAM(s) Oral daily  diltiazem   CD 120milliGRAM(s) Oral daily  gabapentin 600milliGRAM(s) Oral at bedtime  gabapentin 300milliGRAM(s) Oral daily  allopurinol 100milliGRAM(s) Oral daily  fenofibrate Tablet 145milliGRAM(s) Oral daily  simvastatin 10milliGRAM(s) Oral at bedtime  pramipexole 0.125milliGRAM(s) Oral three times a day  ALBUTerol    90 MICROgram(s) HFA Inhaler 2Puff(s) Inhalation every 6 hours  ammonium lactate 12% Lotion 1Application(s) Topical two times a day  hydrALAZINE 150milliGRAM(s) Oral two times a day  fluticasone propionate 50 MICROgram(s)/spray Nasal Spray 1Spray(s) Alternating Nostrils two times a day  metoprolol 12.5milliGRAM(s) Oral two times a day  cefepime  IVPB 1000milliGRAM(s) IV Intermittent daily  lactobacillus acidophilus 1Tablet(s) Oral daily  acetylcysteine  Oral Solution 1200milliGRAM(s) Oral every 12 hours  sodium chloride 0.45%. 1000milliLiter(s) IV Continuous <Continuous>  acetaminophen   Tablet 650milliGRAM(s) Oral once  furosemide   Injectable 20milliGRAM(s) IV Push once    MEDICATIONS  (PRN):  traMADol 100milliGRAM(s) Oral every 6 hours PRN Moderate Pain (4 - 6)  aluminum hydroxide/magnesium hydroxide/simethicone Suspension 30milliLiter(s) Oral every 6 hours PRN Dyspepsia  ondansetron Injectable 4milliGRAM(s) IV Push every 6 hours PRN Nausea  oxyCODONE  5 mG/acetaminophen 325 mG 1Tablet(s) Oral every 4 hours PRN Severe Pain (7 - 10)    Vital Signs Last 24 Hrs  T(C): 37.4, Max: 38.1 (05-09 @ 18:35)  T(F): 99.4, Max: 100.6 (05-09 @ 18:35)  HR: 92 (73 - 107)  BP: 150/39 (110/90 - 175/50)  BP(mean): 64 (64 - 94)  RR: 15 (15 - 26)  SpO2: 95% (91% - 98%)    elderly wm confused put NAD.  lungs clear.  heart regular.  no MRG.  soft.  obsese.  NTND.  (+) b/l dressing clean and dry.                        8.0    8.9   )-----------( 273      ( 10 May 2017 05:00 )             25.7       05-10    143  |  113<H>  |  119<H>  ----------------------------<  89  5.0   |  18<L>  |  2.71<H>    Ca    7.8<L>      10 May 2017 05:00

## 2017-05-11 NOTE — BRIEF OPERATIVE NOTE - OPERATION/FINDINGS
extremely calcified, obstructive plaque in distal R CFA extending to orifice of PFA and SFA; plaque extended proximally into external iliac artery

## 2017-05-11 NOTE — BRIEF OPERATIVE NOTE - PROCEDURE
Thromboendarterectomy  05/11/2017  R common femoral artery and profunda and superficial femoral arteries  Active  MANFRED

## 2017-05-11 NOTE — PROGRESS NOTE ADULT - ASSESSMENT
80 y/o male with h/o COPD, CHF, chronic renal failure, paroxysmal A-fib.with, hematochezia s/p bleeding scan and flex sigmoidoscopy, anemia, chronic LE stasis dermatitis  was was admitted for increased weakness and wheepy right LE and foot. The patient had a RLE ABDULAZIZ boot for chronic venous stasis ulcers and lower extremity chronic edema that was removed recently.. The daughter reports increased oozing from LE ulcers; she reports thet the right foot ulcers were debrided the day PTA. No fever or chills reported. In Ed patient noted to have Hb 6. He received vancomycin 1 gm IV x 1 and cefepime 1 gm IV x 1.    1. Chronic right LE stasis ulcers. Chronic dermatitis with likely superimposed cellulitis. Severe PVD s/p angioplasty. ARF improving. Severe anemia.  -febrile syndrome improving  -left foot wound is open  -f/u BC x 2  -on cefepime 1 gm IV qd # 8  -tolerating abx well so far; no side effects noted   -continue abx coverage  -monitor BMP  -local wound care  -monitor temps  -f/u CBC  -supportive care  2. Other issues: COPD, CHF, chronic renal failure, paroxysmal A-fib.with, hematochezia   -care per medicine

## 2017-05-11 NOTE — PROGRESS NOTE ADULT - SUBJECTIVE AND OBJECTIVE BOX
NEPHROLOGY INTERVAL HPI/OVERNIGHT EVENTS:    HPI:  82 year old male with history of CAD s/p stents (LAD, RCA bare metal around ), A.Fib not on anticoagulation due to GI bleeding, Hypertension, COPD on home O2 2L, SHAYY on nocturnal BIPAP, ex-smoker (smoked 1ppd X 50 years, quit 22 years ago),  Chronic diastolic CHF, Hyperlipidemia, PVD, Iron deficiency anemia, Chronic back pain, Gout, BPH, CKD III with baseline Cr 2, recently admitted to  in  with anemia of GI bleeding, RLE and RUE DVTs, received pRBCs and IVC filter discharged to rehab with aspirin was sent to  ER on 17 for worsening anemia with Hb 6, worsening leg pain from ulcers and worsening renal function Cr 3.99.    Pt seen bed side.  Not well oriented currently after receiving Dilaudid for pain in ER.  As per daughters, pt was walking with a walker at rehab and was doing better.  Pt was found to have tarry black stools in ER, guaic positive. (04 May 2017 15:56)      pt back from the OR, D/W Dr Clement, s/o removal of large hard plaque from the common femoral artery..  pt doing well, somewhat drowsy from anesthesia but appropriate in his replies..      PAST MEDICAL & SURGICAL HISTORY:  Sepsis, due to unspecified organism: 2/2 poorly healing wounds b/l  BPH (benign prostatic hypertrophy)  Hyperlipemia  Coronary artery disease  Hypertension  Dyspepsia: On moderate exertion.  Sleep apnea, obstructive: Requires home 02 therapy, and treatment with BIPAP  Atelectasis  Pleural effusion, bilateral  Respiratory failure  Peripheral edema  CRI (chronic renal insufficiency)  Gout  CRF (chronic renal failure)  Benign prostatic hypertrophy  Spinal stenosis  Hypercholesterolemia  GERD (gastroesophageal reflux disease)  CAD (coronary artery disease)  Hypertension  S/P angioplasty with stent  Cataract of left eye  Prostate: Surgery green light procedure.  S/P rotator cuff surgery: Right  S/P angioplasty  Rotator cuff tear, right: repair      FAMILY HISTORY:  No pertinent family history in first degree relatives      MEDICATIONS  (STANDING):  pantoprazole Infusion 8mG/Hr IV Continuous <Continuous>  buDESOnide   0.5 milliGRAM(s) Respule 0.5milliGRAM(s) Inhalation two times a day  doxazosin 8milliGRAM(s) Oral at bedtime  isosorbide   mononitrate ER Tablet (IMDUR) 120milliGRAM(s) Oral daily  diltiazem   CD 120milliGRAM(s) Oral daily  gabapentin 300milliGRAM(s) Oral daily  allopurinol 100milliGRAM(s) Oral daily  fenofibrate Tablet 145milliGRAM(s) Oral daily  simvastatin 10milliGRAM(s) Oral at bedtime  pramipexole 0.125milliGRAM(s) Oral three times a day  ALBUTerol    90 MICROgram(s) HFA Inhaler 2Puff(s) Inhalation every 6 hours  ammonium lactate 12% Lotion 1Application(s) Topical two times a day  fluticasone propionate 50 MICROgram(s)/spray Nasal Spray 1Spray(s) Both Nostrils two times a day  metoprolol 12.5milliGRAM(s) Oral two times a day  ondansetron Injectable 4milliGRAM(s) IV Push once  cefepime  IVPB 1000milliGRAM(s) IV Intermittent every 24 hours  sodium chloride 0.45%. 1000milliLiter(s) IV Continuous <Continuous>    MEDICATIONS  (PRN):  fentaNYL    Injectable 25MICROGram(s) IV Push every 10 minutes PRN Moderate Pain  ondansetron Injectable 4milliGRAM(s) IV Push once PRN Nausea and/or Vomiting  traMADol 100milliGRAM(s) Oral every 6 hours PRN Moderate Pain (4 - 6)  aluminum hydroxide/magnesium hydroxide/simethicone Suspension 30milliLiter(s) Oral every 6 hours PRN Dyspepsia      Allergies    No Known Allergies    Intolerances        I&O's Summary  I & Os for 24h ending 11 May 2017 07:00  =============================================  IN: 936 ml / OUT: 1550 ml / NET: -614 ml    I & Os for current day (as of 11 May 2017 14:50)  =============================================  IN: 1850 ml / OUT: 360 ml / NET: 1490 ml        REVIEW OF SYSTEMS:      Vital Signs Last 24 Hrs  T(C): 36.1, Max: 37.7 (05-10 @ 23:01)  T(F): 97, Max: 99.8 (05-10 @ 23:01)  HR: 80 (70 - 92)  BP: 148/45 (132/40 - 160/47)  BP(mean): 67 (58 - 80)  RR: 19 (13 - 22)  SpO2: 99% (93% - 99%)  Daily     Daily Weight in k.6 (11 May 2017 06:31)    PHYSICAL EXAM:    General: lethargic  lungs: clear, good air entry  heart RR   abd soft  legs; Being wrapped by nursing    LABS:                        10.8   8.2   )-----------( 231      ( 11 May 2017 12:55 )             33.7         146<H>  |  116<H>  |  112<H>  ----------------------------<  116<H>  5.1   |  18<L>  |  2.40<H>    Ca    7.6<L>      11 May 2017 12:55

## 2017-05-11 NOTE — PROGRESS NOTE ADULT - ASSESSMENT
anemia, likely multifactorial  I suspect that there is a component of GI bleeding, appears stabilized on PPI gtt and hct stable  He has received 5 units of packed red blood cells and getting 5th one now    will delay EGD due to low o2 sat, DW family again        pt now more alert and awake, but is variable  per D hallucinates at times      I had an extensive discussion with the patient's daughter. Secondary to his multiple comorbid disorders, at this time, we will delay endoscopic evaluation. It is likely that he would require intubation and may have respiratory compromise.    protonix bid  DW Dr Henry, plans vascular procedure today    Serial H&H'    tolerating diet          Lower extremity DVT, status post IVC filter    Sepsis likely secondary to lower extremity cellulitis and antibiotics will be continued.  Infectious disease note was appreciated.    Advanced COPD and obstructive sleep apnea reviewed.

## 2017-05-11 NOTE — PROGRESS NOTE ADULT - SUBJECTIVE AND OBJECTIVE BOX
Patient is a 82y old  Male who presents with a chief complaint of Worsening leg pain, anemia, ARYA sent from Rehab (04 May 2017 15:56)      HPI:  82 year old male with history of CAD s/p stents (LAD, RCA bare metal around 9/16), A.Fib not on anticoagulation due to GI bleeding, Hypertension, COPD on home O2 2L, SHAYY on nocturnal BIPAP, ex-smoker (smoked 1ppd X 50 years, quit 22 years ago),  Chronic diastolic CHF, Hyperlipidemia, PVD, Iron deficiency anemia, Chronic back pain, Gout, BPH, CKD III with baseline Cr 2, recently admitted to  in 4/17 with anemia of GI bleeding, RLE and RUE DVTs, received pRBCs and IVC filter discharged to rehab with aspirin was sent to  ER on 5/4/17 for worsening anemia with Hb 6, worsening leg pain from ulcers and worsening renal function Cr 3.99.    Pt seen bed side.  Not well oriented currently after receiving Dilaudid for pain in ER.  As per daughters, pt was walking with a walker at rehab and was doing better.  Pt was found to have tarry black stools in ER, guaic positive. (04 May 2017 15:56)    pt comf   plan procedure today  mild upper abd pain s burning, crampy      PAST MEDICAL & SURGICAL HISTORY:  Sepsis, due to unspecified organism: 2/2 poorly healing wounds b/l  BPH (benign prostatic hypertrophy)  Hyperlipemia  Coronary artery disease  Hypertension  Dyspepsia: On moderate exertion.  Sleep apnea, obstructive: Requires home 02 therapy, and treatment with BIPAP  Atelectasis  Pleural effusion, bilateral  Respiratory failure  Peripheral edema  CRI (chronic renal insufficiency)  Gout  CRF (chronic renal failure)  Benign prostatic hypertrophy  Spinal stenosis  Hypercholesterolemia  GERD (gastroesophageal reflux disease)  CAD (coronary artery disease)  Hypertension  S/P angioplasty with stent  Cataract of left eye  Prostate: Surgery green light procedure.  S/P rotator cuff surgery: Right  S/P angioplasty  Rotator cuff tear, right: repair      MEDICATIONS  (STANDING):    MEDICATIONS  (PRN):      Allergies    No Known Allergies    Intolerances        SOCIAL HISTORY:unchanged    FAMILY HISTORY:  No pertinent family history in first degree relatives      REVIEW OF SYSTEMS:    CONSTITUTIONAL: No weakness, fevers or chills  EYES/ENT: No visual changes;  No vertigo or throat pain   NECK: No pain or stiffness  RESPIRATORY: No cough, wheezing, hemoptysis; No shortness of breath  CARDIOVASCULAR: No chest pain or palpitations  GENITOURINARY: No dysuria, frequency or hematuria  NEUROLOGICAL: No numbness or weakness  SKIN: No itching, burning, rashes, or lesions   All other review of systems is negative unless indicated above.    Vital Signs Last 24 Hrs  T(C): 37.2, Max: 38 (05-10 @ 14:24)  T(F): 98.9, Max: 100.4 (05-10 @ 14:24)  HR: 76 (70 - 105)  BP: 146/38 (141/38 - 160/47)  BP(mean): 67 (58 - 80)  RR: 21 (14 - 22)  SpO2: 97% (93% - 98%)    PHYSICAL EXAM:    Constitutional: NAD, well-developed  HEENT: EOMI, throat clear  Neck: No LAD, supple  Respiratory: CTA and P  Cardiovascular: S1 and S2, RRR, no M  Gastrointestinal: BS+, soft, NT/ND, neg HSM,  Extremities: pos peripheral edema, neg clubing, cyanosis    Neurological: A/O x 3, no focal deficits  Psychiatric: Normal mood, normal affect  Skin: No rashes    LABS:  CBC Full  -  ( 11 May 2017 05:12 )  WBC Count : 7.7 K/uL  Hemoglobin : 9.4 g/dL  Hematocrit : 29.3 %  Platelet Count - Automated : 226 K/uL  Mean Cell Volume : 90.4 fl  Mean Cell Hemoglobin : 29.0 pg  Mean Cell Hemoglobin Concentration : 32.1 gm/dL  Auto Neutrophil # : x  Auto Lymphocyte # : x  Auto Monocyte # : x  Auto Eosinophil # : x  Auto Basophil # : x  Auto Neutrophil % : x  Auto Lymphocyte % : x  Auto Monocyte % : x  Auto Eosinophil % : x  Auto Basophil % : x    05-11    144  |  114<H>  |  118<H>  ----------------------------<  105<H>  4.9   |  19<L>  |  2.52<H>    Ca    8.0<L>      11 May 2017 05:12              RADIOLOGY & ADDITIONAL STUDIES:

## 2017-05-11 NOTE — PROGRESS NOTE ADULT - SUBJECTIVE AND OBJECTIVE BOX
Patient is a 82y old  Male who presents with a chief complaint of weakness  HPI:  82 y/o male with h/of COPD, CHF, chronic renal failure, paroxysmal A-fib.with, hematochezia s/p bleeding scan and flex sigmoidoscopy, anemia, chronic LE stasis dermatitis  was was admitted for increased weakness and wheepy right LE and foot. The patient had a RLE ABDULAZIZ boot for chronic venous stasis ulcers and lower extremity chronic edema that was removed recently.. The daughter reports increased oozing from LE ulcers; she reports thet the right foot ulcers were debrided the day PTA. No fever or chills reported. In Ed patient noted to have Hb 6. He received vancomycin 1 gm IV x 1 and cefepime 1 gm IV x 1.    s/p angioplasty  Sleepy  Lying in bed in NAD  Tmax 100.4F    MEDICATIONS  (STANDING):  sodium chloride 0.9%. 1000milliLiter(s) IV Continuous <Continuous>  ceFAZolin   IVPB 2000milliGRAM(s) IV Intermittent every 8 hours  pantoprazole Infusion 8mG/Hr IV Continuous <Continuous>    MEDICATIONS  (PRN):  fentaNYL    Injectable 25MICROGram(s) IV Push every 10 minutes PRN Moderate Pain  ondansetron Injectable 4milliGRAM(s) IV Push once PRN Nausea and/or Vomiting      Vital Signs Last 24 Hrs  T(C): 36.9, Max: 38 (05-10 @ 14:24)  T(F): 98.4, Max: 100.4 (05-10 @ 14:24)  HR: 88 (70 - 92)  BP: 136/37 (132/40 - 160/47)  BP(mean): 67 (58 - 80)  RR: 18 (13 - 22)  SpO2: 99% (93% - 99%)    Physical Exam:    Constitutional: frail looking  HEENT: NC/AT, EOMI, PERRLA  Neck: supple  Back: no tenderness  Respiratory: few basal rales  Cardiovascular: S1S2 regular, no murmurs  Abdomen: soft, not tender, not distended, positive BS  Genitourinary: deferred  Rectal: deferred  Musculoskeletal: no muscle tenderness, no joint swelling or tenderness  Extremities: b/l chronic skin changes with edema; RLE: dorsal aspect of foot and lower ankle superficial ulcers with scant discharge; surrounding erythema and edema - persistent  Neurological: confused, moving all extremities, no focal deficits  Skin: no rashes    Labs:                        10.8   8.2   )-----------( 231      ( 11 May 2017 12:55 )             33.7     05-11    146<H>  |  116<H>  |  112<H>  ----------------------------<  116<H>  5.1   |  18<L>  |  2.40<H>    Ca    7.6<L>      11 May 2017 12:55               8.3    10.1  )-----------( 282      ( 09 May 2017 04:53 )             25.0     05-09    141  |  111<H>  |  118<H>  ----------------------------<  90  5.2   |  19<L>  |  2.58<H>    Ca    7.9<L>      09 May 2017 04:53               7.4    13.7  )-----------( 354      ( 07 May 2017 06:47 )             23.9     05-07    148<H>  |  115<H>  |  136<H>  ----------------------------<  166<H>  4.8   |  22  |  3.42<H>    Ca    8.2<L>      07 May 2017 06:47                 8.3    14.5  )-----------( 370      ( 06 May 2017 04:43 )             26.7     05-06    153<H>  |  121<H>  |  142<H>  ----------------------------<  101<H>  5.0   |  20<L>  |  3.67<H>    Ca    8.2<L>      06 May 2017 04:43  Mg     2.8     05-05    TPro  6.6  /  Alb  2.4<L>  /  TBili  0.2  /  DBili  x   /  AST  20  /  ALT  15  /  AlkPhos  72  05-04               6.8    14.1  )-----------( 375      ( 05 May 2017 05:10 )             20.1     05-05    149<H>  |  118<H>  |  143<H>  ----------------------------<  109<H>  4.9   |  21<L>  |  3.68<H>    Ca    8.3<L>      05 May 2017 05:10  Mg     2.8     05-05    TPro  6.6  /  Alb  2.4<L>  /  TBili  0.2  /  DBili  x   /  AST  20  /  ALT  15  /  AlkPhos  72  05-04               6.3    10.8  )-----------( 398      ( 04 May 2017 12:54 )             21.8     05-04    145  |  112<H>  |  138<H>  ----------------------------<  123<H>  5.5<H>   |  19<L>  |  3.99<H>    Ca    8.8      04 May 2017 12:54    TPro  6.6  /  Alb  2.4<L>  /  TBili  0.2  /  DBili  x   /  AST  20  /  ALT  15  /  AlkPhos  72  05-04     LIVER FUNCTIONS - ( 04 May 2017 12:54 )  Alb: 2.4 g/dL / Pro: 6.6 gm/dL / ALK PHOS: 72 U/L / ALT: 15 U/L / AST: 20 U/L / GGT: x           Culture - Blood (05.04.17 @ 13:21)    Specimen Source: .Blood None    Culture Results:   No growth to date.        Radiology:    Advanced directives addressed: full resuscitation

## 2017-05-11 NOTE — PROGRESS NOTE ADULT - ASSESSMENT
# ARYA due to pre-renal azothemia with CKD stage 4 at baseline ( 2.0-2.5)  # Anemia due to acute blood loss anemia  # Sepsis? due to LE ulcers  # Multiple DVT on RUE/RLE with s/p IVC fileter  # HTN    PLAN  - moniter off diuretics ( only inbetween blood transfusion) and fu response to the PRBC transfusion  - continue with current anti HTN  - strick I/O   - abx as per ID, pt given one dose of vanc in ER  family updated at bedside    5/9 SY  --ARYA/CKD    Creat now stabilized.  Continue to monitor for now post angiogram.  Continue current IVF for another day.  --PVD  Post Stent placement in Right common and external iliac arteries.  Continue to monitor wounds.  --Electrolytes improved and stable.    5/10 MK  - ARYA/CKd stage 4 at baseline, for now hold diureitcs  - PVD for repeat intervention tomorrow, will continue with mucomyst and IVf at current rate  - Cellulitis: abx as per ID  - Anemia : for now symptomatic treatment.  transfusion of 2 unit of PRBC with lasix inbetween.  IVF on hold and restart when his NPO status starts  dw dr damico, daughter and RN    5/11    back from the OR  D/W Dr Clement, s/o removal of large hard plaque from the common femoral artery..  will cont iv hydration  tolerating the ivf  creat improving  may need further angio once creat stable, for distal perfusion

## 2017-05-12 LAB
ANION GAP SERPL CALC-SCNC: 9 MMOL/L — SIGNIFICANT CHANGE UP (ref 5–17)
BASOPHILS # BLD AUTO: 0.1 K/UL — SIGNIFICANT CHANGE UP (ref 0–0.2)
BASOPHILS NFR BLD AUTO: 0.8 % — SIGNIFICANT CHANGE UP (ref 0–2)
BUN SERPL-MCNC: 95 MG/DL — HIGH (ref 7–23)
CALCIUM SERPL-MCNC: 7.8 MG/DL — LOW (ref 8.5–10.1)
CHLORIDE SERPL-SCNC: 118 MMOL/L — HIGH (ref 96–108)
CO2 SERPL-SCNC: 18 MMOL/L — LOW (ref 22–31)
CREAT SERPL-MCNC: 2.09 MG/DL — HIGH (ref 0.5–1.3)
EOSINOPHIL # BLD AUTO: 0.3 K/UL — SIGNIFICANT CHANGE UP (ref 0–0.5)
EOSINOPHIL NFR BLD AUTO: 3.4 % — SIGNIFICANT CHANGE UP (ref 0–6)
GLUCOSE SERPL-MCNC: 124 MG/DL — HIGH (ref 70–99)
HCT VFR BLD CALC: 34 % — LOW (ref 39–50)
HGB BLD-MCNC: 10.7 G/DL — LOW (ref 13–17)
LYMPHOCYTES # BLD AUTO: 0.6 K/UL — LOW (ref 1–3.3)
LYMPHOCYTES # BLD AUTO: 7.3 % — LOW (ref 13–44)
MCHC RBC-ENTMCNC: 29 PG — SIGNIFICANT CHANGE UP (ref 27–34)
MCHC RBC-ENTMCNC: 31.5 GM/DL — LOW (ref 32–36)
MCV RBC AUTO: 92.2 FL — SIGNIFICANT CHANGE UP (ref 80–100)
MONOCYTES # BLD AUTO: 0.5 K/UL — SIGNIFICANT CHANGE UP (ref 0–0.9)
MONOCYTES NFR BLD AUTO: 6.2 % — SIGNIFICANT CHANGE UP (ref 2–14)
NEUTROPHILS # BLD AUTO: 6.8 K/UL — SIGNIFICANT CHANGE UP (ref 1.8–7.4)
NEUTROPHILS NFR BLD AUTO: 82.3 % — HIGH (ref 43–77)
PLATELET # BLD AUTO: 244 K/UL — SIGNIFICANT CHANGE UP (ref 150–400)
POTASSIUM SERPL-MCNC: 5.4 MMOL/L — HIGH (ref 3.5–5.3)
POTASSIUM SERPL-SCNC: 5.4 MMOL/L — HIGH (ref 3.5–5.3)
RBC # BLD: 3.69 M/UL — LOW (ref 4.2–5.8)
RBC # FLD: 15.7 % — HIGH (ref 10.3–14.5)
SODIUM SERPL-SCNC: 145 MMOL/L — SIGNIFICANT CHANGE UP (ref 135–145)
TROPONIN I SERPL-MCNC: 0.03 NG/ML — SIGNIFICANT CHANGE UP (ref 0.01–0.04)
WBC # BLD: 8.3 K/UL — SIGNIFICANT CHANGE UP (ref 3.8–10.5)
WBC # FLD AUTO: 8.3 K/UL — SIGNIFICANT CHANGE UP (ref 3.8–10.5)

## 2017-05-12 PROCEDURE — 71010: CPT | Mod: 26

## 2017-05-12 RX ORDER — FUROSEMIDE 40 MG
20 TABLET ORAL ONCE
Qty: 0 | Refills: 0 | Status: COMPLETED | OUTPATIENT
Start: 2017-05-12 | End: 2017-05-12

## 2017-05-12 RX ORDER — PANTOPRAZOLE SODIUM 20 MG/1
40 TABLET, DELAYED RELEASE ORAL EVERY 12 HOURS
Qty: 0 | Refills: 0 | Status: DISCONTINUED | OUTPATIENT
Start: 2017-05-12 | End: 2017-05-24

## 2017-05-12 RX ORDER — IPRATROPIUM/ALBUTEROL SULFATE 18-103MCG
3 AEROSOL WITH ADAPTER (GRAM) INHALATION ONCE
Qty: 0 | Refills: 0 | Status: COMPLETED | OUTPATIENT
Start: 2017-05-12 | End: 2017-05-12

## 2017-05-12 RX ADMIN — TRAMADOL HYDROCHLORIDE 100 MILLIGRAM(S): 50 TABLET ORAL at 02:44

## 2017-05-12 RX ADMIN — PRAMIPEXOLE DIHYDROCHLORIDE 0.12 MILLIGRAM(S): 0.12 TABLET ORAL at 05:34

## 2017-05-12 RX ADMIN — Medication 1200 MILLIGRAM(S): at 05:34

## 2017-05-12 RX ADMIN — Medication 0.5 MILLIGRAM(S): at 20:45

## 2017-05-12 RX ADMIN — ISOSORBIDE MONONITRATE 120 MILLIGRAM(S): 60 TABLET, EXTENDED RELEASE ORAL at 14:13

## 2017-05-12 RX ADMIN — PANTOPRAZOLE SODIUM 10 MG/HR: 20 TABLET, DELAYED RELEASE ORAL at 08:39

## 2017-05-12 RX ADMIN — GABAPENTIN 600 MILLIGRAM(S): 400 CAPSULE ORAL at 22:03

## 2017-05-12 RX ADMIN — CEFEPIME 100 MILLIGRAM(S): 1 INJECTION, POWDER, FOR SOLUTION INTRAMUSCULAR; INTRAVENOUS at 18:31

## 2017-05-12 RX ADMIN — GABAPENTIN 300 MILLIGRAM(S): 400 CAPSULE ORAL at 08:35

## 2017-05-12 RX ADMIN — Medication 100 MILLIGRAM(S): at 14:15

## 2017-05-12 RX ADMIN — Medication 20 MILLIGRAM(S): at 14:59

## 2017-05-12 RX ADMIN — Medication 8 MILLIGRAM(S): at 22:01

## 2017-05-12 RX ADMIN — PRAMIPEXOLE DIHYDROCHLORIDE 0.12 MILLIGRAM(S): 0.12 TABLET ORAL at 14:13

## 2017-05-12 RX ADMIN — Medication 150 MILLIGRAM(S): at 18:40

## 2017-05-12 RX ADMIN — ALBUTEROL 2 PUFF(S): 90 AEROSOL, METERED ORAL at 20:46

## 2017-05-12 RX ADMIN — Medication 0.5 MILLIGRAM(S): at 09:24

## 2017-05-12 RX ADMIN — Medication 150 MILLIGRAM(S): at 05:34

## 2017-05-12 RX ADMIN — Medication 12.5 MILLIGRAM(S): at 18:39

## 2017-05-12 RX ADMIN — Medication 1 TABLET(S): at 14:14

## 2017-05-12 RX ADMIN — Medication 1 APPLICATION(S): at 14:00

## 2017-05-12 RX ADMIN — PANTOPRAZOLE SODIUM 40 MILLIGRAM(S): 20 TABLET, DELAYED RELEASE ORAL at 18:40

## 2017-05-12 RX ADMIN — Medication 120 MILLIGRAM(S): at 05:34

## 2017-05-12 RX ADMIN — SIMVASTATIN 10 MILLIGRAM(S): 20 TABLET, FILM COATED ORAL at 22:03

## 2017-05-12 RX ADMIN — SODIUM CHLORIDE 75 MILLILITER(S): 9 INJECTION, SOLUTION INTRAVENOUS at 05:34

## 2017-05-12 RX ADMIN — Medication 1 SPRAY(S): at 05:37

## 2017-05-12 RX ADMIN — Medication 145 MILLIGRAM(S): at 14:14

## 2017-05-12 RX ADMIN — ALBUTEROL 2 PUFF(S): 90 AEROSOL, METERED ORAL at 09:24

## 2017-05-12 RX ADMIN — Medication 12.5 MILLIGRAM(S): at 05:34

## 2017-05-12 RX ADMIN — Medication 1 SPRAY(S): at 18:51

## 2017-05-12 RX ADMIN — PRAMIPEXOLE DIHYDROCHLORIDE 0.12 MILLIGRAM(S): 0.12 TABLET ORAL at 22:03

## 2017-05-12 NOTE — PROGRESS NOTE ADULT - SUBJECTIVE AND OBJECTIVE BOX
stable overnight; R foot pain signficantly improved; Hct stable, and Cr continues to improve; Na coming down, BUN elevated presumably from GI blood metabolism    MEDICATIONS  (STANDING):  pantoprazole Infusion 8mG/Hr IV Continuous <Continuous>  buDESOnide   0.5 milliGRAM(s) Respule 0.5milliGRAM(s) Inhalation two times a day  doxazosin 8milliGRAM(s) Oral at bedtime  isosorbide   mononitrate ER Tablet (IMDUR) 120milliGRAM(s) Oral daily  diltiazem   CD 120milliGRAM(s) Oral daily  gabapentin 300milliGRAM(s) Oral daily  allopurinol 100milliGRAM(s) Oral daily  fenofibrate Tablet 145milliGRAM(s) Oral daily  simvastatin 10milliGRAM(s) Oral at bedtime  pramipexole 0.125milliGRAM(s) Oral three times a day  ALBUTerol    90 MICROgram(s) HFA Inhaler 2Puff(s) Inhalation every 6 hours  ammonium lactate 12% Lotion 1Application(s) Topical two times a day  fluticasone propionate 50 MICROgram(s)/spray Nasal Spray 1Spray(s) Both Nostrils two times a day  metoprolol 12.5milliGRAM(s) Oral two times a day  ondansetron Injectable 4milliGRAM(s) IV Push once  cefepime  IVPB 1000milliGRAM(s) IV Intermittent every 24 hours  sodium chloride 0.45%. 1000milliLiter(s) IV Continuous <Continuous>  hydrALAZINE 150milliGRAM(s) Oral two times a day  lactobacillus acidophilus 1Tablet(s) Oral daily  gabapentin 600milliGRAM(s) Oral at bedtime  acetylcysteine  Oral Solution 1200milliGRAM(s) Oral every 12 hours    MEDICATIONS  (PRN):  traMADol 100milliGRAM(s) Oral every 6 hours PRN Moderate Pain (4 - 6)  aluminum hydroxide/magnesium hydroxide/simethicone Suspension 30milliLiter(s) Oral every 6 hours PRN Dyspepsia  oxyCODONE  5 mG/acetaminophen 325 mG 1Tablet(s) Oral every 4 hours PRN Severe Pain (7 - 10)      Allergies    No Known Allergies    Intolerances        Flatus: [ ] YES [ ] NO             Bowel Movement: [ ] YES [ ] NO  Pain (0-10):            Pain Control Adequate: [ ] YES [ ] NO  Nausea: [ ] YES [ ] NO            Vomiting: [ ] YES [ ] NO  Diarrhea: [ ] YES [ ] NO         Constipation: [ ] YES [ ] NO     Chest Pain: [ ] YES [ ] NO    SOB:  [ ] YES [ ] NO    Vital Signs Last 24 Hrs  T(C): 36.7, Max: 36.9 (05-11 @ 12:37)  T(F): 98, Max: 98.4 (05-11 @ 12:37)  HR: 81 (72 - 101)  BP: 148/45 (132/40 - 148/45)  BP(mean): --  RR: 19 (13 - 22)  SpO2: 98% (93% - 100%)    I&O's Summary    I & Os for current day (as of 12 May 2017 08:12)  =============================================  IN: 3863.8 ml / OUT: 1260 ml / NET: 2603.8 ml      Physical Exam:  General: NAD, resting comfortably  Pulmonary: normal resp effort, CTA-B  Cardiovascular: NSR  Abdominal: soft, NT/ND  Extremities: WWP, normal strength  Neuro: A/O x 3, CNs II-XII grossly intact, normal motor/sensation, no focal deficits  Pulses:   R foot equally warm as L with cap refill less than 2 s but neurosensory and motor still compromised    LABS:                        10.7   8.3   )-----------( 244      ( 12 May 2017 06:42 )             34.0     05-12    145  |  118<H>  |  95<H>  ----------------------------<  124<H>  5.4<H>   |  18<L>  |  2.09<H>    Ca    7.8<L>      12 May 2017 05:33            CAPILLARY BLOOD GLUCOSE      RADIOLOGY & ADDITIONAL TESTS:

## 2017-05-12 NOTE — CONSULT NOTE ADULT - SUBJECTIVE AND OBJECTIVE BOX
Patient is a 82y old  Male who presents with a chief complaint of Worsening leg pain, anemia, ARYA sent from Rehab (04 May 2017 15:56)      HPI:  82 year old male with history of CAD s/p stents (LAD, RCA bare metal around 9/16), A.Fib not on anticoagulation due to GI bleeding, Hypertension, COPD on home O2 2L, SHAYY on nocturnal BIPAP, ex-smoker (smoked 1ppd X 50 years, quit 22 years ago),  Chronic diastolic CHF, Hyperlipidemia, PVD, Iron deficiency anemia, Chronic back pain, Gout, BPH, CKD III with baseline Cr 2, recently admitted to  in 4/17 with anemia of GI bleeding, RLE and RUE DVTs, received pRBCs and IVC filter discharged to rehab with aspirin was sent to  ER on 5/4/17 for worsening anemia with Hb 6, worsening leg pain from ulcers and worsening renal function Cr 3.99.    Pt seen bed side.  Not well oriented currently after receiving Dilaudid for pain in ER.  As per daughters, pt was walking with a walker at rehab and was doing better.  Pt was found to have tarry black stools in ER, guaic positive. (04 May 2017 15:56)    data rev with pt's RN and DTR at bedside today  tolerated his vascular surgery well , extubated and is having bkfst in bed this am  no cp  no sig cough or wheeze  uses his biapp at night  PAST MEDICAL & SURGICAL HISTORY:  Sepsis, due to unspecified organism: 2/2 poorly healing wounds b/l  BPH (benign prostatic hypertrophy)  Hyperlipemia  Coronary artery disease  Hypertension  Dyspepsia: On moderate exertion.  Sleep apnea, obstructive: Requires home 02 therapy, and treatment with BIPAP  Atelectasis  Pleural effusion, bilateral  Respiratory failure  Peripheral edema  CRI (chronic renal insufficiency)  Gout  CRF (chronic renal failure)  Benign prostatic hypertrophy  Spinal stenosis  Hypercholesterolemia  GERD (gastroesophageal reflux disease)  CAD (coronary artery disease)  Hypertension  S/P angioplasty with stent  Cataract of left eye  Prostate: Surgery green light procedure.  S/P rotator cuff surgery: Right  S/P angioplasty  Rotator cuff tear, right: repair      PREVIOUS DIAGNOSTIC TESTING:      MEDICATIONS  (STANDING):  pantoprazole Infusion 8mG/Hr IV Continuous <Continuous>  buDESOnide   0.5 milliGRAM(s) Respule 0.5milliGRAM(s) Inhalation two times a day  doxazosin 8milliGRAM(s) Oral at bedtime  isosorbide   mononitrate ER Tablet (IMDUR) 120milliGRAM(s) Oral daily  diltiazem   CD 120milliGRAM(s) Oral daily  gabapentin 300milliGRAM(s) Oral daily  allopurinol 100milliGRAM(s) Oral daily  fenofibrate Tablet 145milliGRAM(s) Oral daily  simvastatin 10milliGRAM(s) Oral at bedtime  pramipexole 0.125milliGRAM(s) Oral three times a day  ALBUTerol    90 MICROgram(s) HFA Inhaler 2Puff(s) Inhalation every 6 hours  ammonium lactate 12% Lotion 1Application(s) Topical two times a day  fluticasone propionate 50 MICROgram(s)/spray Nasal Spray 1Spray(s) Both Nostrils two times a day  metoprolol 12.5milliGRAM(s) Oral two times a day  ondansetron Injectable 4milliGRAM(s) IV Push once  cefepime  IVPB 1000milliGRAM(s) IV Intermittent every 24 hours  sodium chloride 0.45%. 1000milliLiter(s) IV Continuous <Continuous>  hydrALAZINE 150milliGRAM(s) Oral two times a day  lactobacillus acidophilus 1Tablet(s) Oral daily  gabapentin 600milliGRAM(s) Oral at bedtime  acetylcysteine  Oral Solution 1200milliGRAM(s) Oral every 12 hours    MEDICATIONS  (PRN):  traMADol 100milliGRAM(s) Oral every 6 hours PRN Moderate Pain (4 - 6)  aluminum hydroxide/magnesium hydroxide/simethicone Suspension 30milliLiter(s) Oral every 6 hours PRN Dyspepsia  oxyCODONE  5 mG/acetaminophen 325 mG 1Tablet(s) Oral every 4 hours PRN Severe Pain (7 - 10)      FAMILY HISTORY:  No pertinent family history in first degree relatives      SOCIAL HISTORY:  ***    REVIEW OF SYSTEM:  Pertinent items are noted in HPI.  Constitutional negative for chills, fevers, sweats and weight loss  throat, and face:  negative for epistaxis, nasal congestion, sore throat and   tinnitus  Respiratory:pos dyspnea on exertion, pleuritic chest pain  and wheezing  Cardiovascular:  negative for chest pain, dyspnea and palpitations  Gastrointestinal: negative for abdominal pain, diarrhea, nausea and vomiting  Genitourinary: negative for dysuria, frequency and urinary incontinence  Skin:  negative for redness, rash, pruritus, swelling, dryness and   fissures  Hematologic/lymphatic: negative for bleeding and easy bruising  Musculoskeletal: posfor arthralgias, back pain and muscle weakness  Neurological: negative for dizziness, headaches, seizures and tremors  Behavioral/Psych:  negative for mood change, depression, suicidal attempts      Vital Signs Last 24 Hrs  T(C): 36.7, Max: 36.9 (05-11 @ 12:37)  T(F): 98, Max: 98.4 (05-11 @ 12:37)  HR: 81 (72 - 101)  BP: 148/45 (132/40 - 148/45)  BP(mean): --  RR: 19 (13 - 22)  SpO2: 98% (93% - 100%)    I&O's Summary    I & Os for current day (as of 12 May 2017 08:38)  =============================================  IN: 3863.8 ml / OUT: 1260 ml / NET: 2603.8 ml    PHYSICAL EXAM  General Appearance: cooperative, no acute distress,   HEENT: PERRL, conjunctiva clear, EOM's intact,   Neck: Supple, , no adenopathy, thyroid: not enlarged, no carotid bruit or JVD  Back: Symmetric, no  tenderness,no soft tissue tenderness  Lungs: Clear to auscultation bilateral,no adventitious breath sounds, normal   expiratory phase, few bibasilar rales  Heart: Regular rate and rhythm, S1, S2 normal, no murmur, rub or gallop  Abdomen: Soft, non-tender, bowel sounds active , no hepatosplenomegaly  Extremities:cyanotic right toes, chrnic peripheral/ distal ulcerations/ peripheral edema  Skin: Skin color, texture normal, no rashes   Neurologic: Alert and oriented X3 , cranial nerves intact, sensory and motor normal,    ECG:    LABS:                          10.7   8.3   )-----------( 244      ( 12 May 2017 06:42 )             34.0     05-12    145  |  118<H>  |  95<H>  ----------------------------<  124<H>  5.4<H>   |  18<L>  |  2.09<H>    Ca    7.8<L>      12 May 2017 05:33      CARDIAC MARKERS ( 12 May 2017 05:33 )  0.026 ng/mL / x     / x     / x     / x      CARDIAC MARKERS ( 11 May 2017 12:55 )  0.033 ng/mL / x     / x     / x     / x              PROCEDURE DATE:  05/04/2017        INTERPRETATION:  History: GI bleed admission    Chest:  one view.      Comparison: 3/31/2017    AP radiograph of the chest demonstrates no evidence of infiltrate,   pleural effusion or vascular congestion. No atelectasis is seen. The   cardiac silhouette is normal in size. Osseous structures are intact.    Impression: No active pulmonary disease.    Contrast:     Oral contrast only    Comparison: CT abdomen and pelvis 6/13/2016    Findings:  LIVER: Normal.  SPLEEN: Normal.  PANCREAS: Normal.  GALLBLADDER/BILIARY TREE: Nondilated. Gallstone is present.  ADRENALS: Normal.  KIDNEYS: No calcification, hydronephrosis, or soft tissue attenuating   renal mass.  LYMPHADENOPATHY/RETROPERITONEUM: No adenopathy.  VASCULATURE: Extensive aortoiliac vascular calcification without   aneurysm. Infrarenal vena cava filter in place.  BOWEL: No bowel related abnormality. Specifically, no bowel obstruction.  PELVIC VISCERA: Calcified BPH in the prostate is noted.  PELVIC LYMPH NODES: No pelvic adenopathy.  PERITONEUM/ABDOMINAL WALL: No free air orascites.  SKELETAL: No acute bony abnormality.  LUNG BASES: Clear.    IMPRESSION:     Preponderance of abdominal visceral adipose tissue without evidence for   obstruction, mass, or ascites.          RADIOLOGY & ADDITIONAL STUDIES:

## 2017-05-12 NOTE — PROGRESS NOTE ADULT - ASSESSMENT
80 y/o male with h/o COPD, CHF, chronic renal failure, paroxysmal A-fib.with, hematochezia s/p bleeding scan and flex sigmoidoscopy, anemia, chronic LE stasis dermatitis  was was admitted for increased weakness and wheepy right LE and foot. The patient had a RLE ABDULAZIZ boot for chronic venous stasis ulcers and lower extremity chronic edema that was removed recently.. The daughter reports increased oozing from LE ulcers; she reports thet the right foot ulcers were debrided the day PTA. No fever or chills reported. In Ed patient noted to have Hb 6. He received vancomycin 1 gm IV x 1 and cefepime 1 gm IV x 1.    1. Chronic right LE stasis ulcers. Chronic dermatitis with likely superimposed cellulitis. Severe PVD s/p angioplasty. ARF improving. Severe anemia.  -right foot is wormer s/p angioplasty  -left foot wound is open  -f/u BC x 2  -renal function is improving  -on cefepime 1 gm IV qd # 9  -tolerating abx well so far; no side effects noted   -continue abx coverage for now  -monitor BMP  -local wound care  -monitor temps  -f/u CBC  -supportive care  2. Other issues: COPD, CHF, chronic renal failure, paroxysmal A-fib.with, hematochezia   -care per medicine

## 2017-05-12 NOTE — PROGRESS NOTE ADULT - SUBJECTIVE AND OBJECTIVE BOX
Patient is a 82y old  Male who presents with a chief complaint of Worsening leg pain, anemia, ARYA sent from Rehab (04 May 2017 15:56)      HPI:  82 year old male with history of CAD s/p stents (LAD, RCA bare metal around 9/16), A.Fib not on anticoagulation due to GI bleeding, Hypertension, COPD on home O2 2L, SHAYY on nocturnal BIPAP, ex-smoker (smoked 1ppd X 50 years, quit 22 years ago),  Chronic diastolic CHF, Hyperlipidemia, PVD, Iron deficiency anemia, Chronic back pain, Gout, BPH, CKD III with baseline Cr 2, recently admitted to  in 4/17 with anemia of GI bleeding, RLE and RUE DVTs, received pRBCs and IVC filter discharged to rehab with aspirin was sent to  ER on 5/4/17 for worsening anemia with Hb 6, worsening leg pain from ulcers and worsening renal function Cr 3.99.    Pt seen bed side.  Not well oriented currently after receiving Dilaudid for pain in ER.  As per daughters, pt was walking with a walker at rehab and was doing better.  Pt was found to have tarry black stools in ER, guaic positive. (04 May 2017 15:56)      pt comf  s/p intervention for vascular dx  neg bleeding  mild upper abd crampy pain last night, improved      PAST MEDICAL & SURGICAL HISTORY:  Sepsis, due to unspecified organism: 2/2 poorly healing wounds b/l  BPH (benign prostatic hypertrophy)  Hyperlipemia  Coronary artery disease  Hypertension  Dyspepsia: On moderate exertion.  Sleep apnea, obstructive: Requires home 02 therapy, and treatment with BIPAP  Atelectasis  Pleural effusion, bilateral  Respiratory failure  Peripheral edema  CRI (chronic renal insufficiency)  Gout  CRF (chronic renal failure)  Benign prostatic hypertrophy  Spinal stenosis  Hypercholesterolemia  GERD (gastroesophageal reflux disease)  CAD (coronary artery disease)  Hypertension  S/P angioplasty with stent  Cataract of left eye  Prostate: Surgery green light procedure.  S/P rotator cuff surgery: Right  S/P angioplasty  Rotator cuff tear, right: repair      MEDICATIONS  (STANDING):  pantoprazole Infusion 8mG/Hr IV Continuous <Continuous>  buDESOnide   0.5 milliGRAM(s) Respule 0.5milliGRAM(s) Inhalation two times a day  doxazosin 8milliGRAM(s) Oral at bedtime  isosorbide   mononitrate ER Tablet (IMDUR) 120milliGRAM(s) Oral daily  diltiazem   CD 120milliGRAM(s) Oral daily  gabapentin 300milliGRAM(s) Oral daily  allopurinol 100milliGRAM(s) Oral daily  fenofibrate Tablet 145milliGRAM(s) Oral daily  simvastatin 10milliGRAM(s) Oral at bedtime  pramipexole 0.125milliGRAM(s) Oral three times a day  ALBUTerol    90 MICROgram(s) HFA Inhaler 2Puff(s) Inhalation every 6 hours  ammonium lactate 12% Lotion 1Application(s) Topical two times a day  fluticasone propionate 50 MICROgram(s)/spray Nasal Spray 1Spray(s) Both Nostrils two times a day  metoprolol 12.5milliGRAM(s) Oral two times a day  ondansetron Injectable 4milliGRAM(s) IV Push once  cefepime  IVPB 1000milliGRAM(s) IV Intermittent every 24 hours  sodium chloride 0.45%. 1000milliLiter(s) IV Continuous <Continuous>  hydrALAZINE 150milliGRAM(s) Oral two times a day  lactobacillus acidophilus 1Tablet(s) Oral daily  gabapentin 600milliGRAM(s) Oral at bedtime  acetylcysteine  Oral Solution 1200milliGRAM(s) Oral every 12 hours  ALBUTerol/ipratropium for Nebulization. 3milliLiter(s) Nebulizer once    MEDICATIONS  (PRN):  traMADol 100milliGRAM(s) Oral every 6 hours PRN Moderate Pain (4 - 6)  aluminum hydroxide/magnesium hydroxide/simethicone Suspension 30milliLiter(s) Oral every 6 hours PRN Dyspepsia  oxyCODONE  5 mG/acetaminophen 325 mG 1Tablet(s) Oral every 4 hours PRN Severe Pain (7 - 10)      Allergies    No Known Allergies    Intolerances        SOCIAL HISTORY:uncahnged    FAMILY HISTORY:  No pertinent family history in first degree relatives      REVIEW OF SYSTEMS:    CONSTITUTIONAL: No weakness, fevers or chills  EYES/ENT: No visual changes;  No vertigo or throat pain   NECK: No pain or stiffness  RESPIRATORY: No cough, wheezing, hemoptysis; No shortness of breath  CARDIOVASCULAR: No chest pain or palpitations  GENITOURINARY: No dysuria, frequency or hematuria  NEUROLOGICAL: No numbness or weakness  SKIN: No itching, burning, rashes, or lesions   All other review of systems is negative unless indicated above.    Vital Signs Last 24 Hrs  T(C): 36.7, Max: 36.9 (05-11 @ 12:37)  T(F): 98, Max: 98.4 (05-11 @ 12:37)  HR: 81 (72 - 101)  BP: 148/45 (132/40 - 148/45)  BP(mean): --  RR: 19 (13 - 22)  SpO2: 98% (93% - 100%)    PHYSICAL EXAM:    Constitutional: NAD, well-developed  HEENT: EOMI, throat clear  Neck: No LAD, supple  Respiratory: CTA and P  Cardiovascular: S1 and S2, RRR, no M  Gastrointestinal: BS+, soft, NT/ND, neg HSM,  Extremities: mark peripheral edema and LE ulcers, neg clubing, cyanosis    Neurological: A/O x 2, no focal deficits  Psychiatric: Normal mood, normal affect  Skin: No rashes    LABS:  CBC Full  -  ( 12 May 2017 06:42 )  WBC Count : 8.3 K/uL  Hemoglobin : 10.7 g/dL  Hematocrit : 34.0 %  Platelet Count - Automated : 244 K/uL  Mean Cell Volume : 92.2 fl  Mean Cell Hemoglobin : 29.0 pg  Mean Cell Hemoglobin Concentration : 31.5 gm/dL  Auto Neutrophil # : 6.8 K/uL  Auto Lymphocyte # : 0.6 K/uL  Auto Monocyte # : 0.5 K/uL  Auto Eosinophil # : 0.3 K/uL  Auto Basophil # : 0.1 K/uL  Auto Neutrophil % : 82.3 %  Auto Lymphocyte % : 7.3 %  Auto Monocyte % : 6.2 %  Auto Eosinophil % : 3.4 %  Auto Basophil % : 0.8 %    05-12    145  |  118<H>  |  95<H>  ----------------------------<  124<H>  5.4<H>   |  18<L>  |  2.09<H>    Ca    7.8<L>      12 May 2017 05:33              RADIOLOGY & ADDITIONAL STUDIES:

## 2017-05-12 NOTE — PROGRESS NOTE ADULT - ASSESSMENT
# ARYA due to pre-renal azothemia with CKD stage 4 at baseline ( 2.0-2.5)  # Anemia due to acute blood loss anemia  # Sepsis? due to LE ulcers  # Multiple DVT on RUE/RLE with s/p IVC fileter  # HTN    PLAN  - moniter off diuretics ( only inbetween blood transfusion) and fu response to the PRBC transfusion  - continue with current anti HTN  - strick I/O   - abx as per ID, pt given one dose of vanc in ER  family updated at bedside    5/9 SY  --ARYA/CKD    Creat now stabilized.  Continue to monitor for now post angiogram.  Continue current IVF for another day.  --PVD  Post Stent placement in Right common and external iliac arteries.  Continue to monitor wounds.  --Electrolytes improved and stable.    5/10 MK  - ARYA/CKd stage 4 at baseline, for now hold diureitcs  - PVD for repeat intervention tomorrow, will continue with mucomyst and IVf at current rate  - Cellulitis: abx as per ID  - Anemia : for now symptomatic treatment.  transfusion of 2 unit of PRBC with lasix inbetween.  IVF on hold and restart when his NPO status starts  dw dr damico, daughter and RN    5/11    back from the OR  D/W Dr Clement, s/o removal of large hard plaque from the common femoral artery..  will cont iv hydration  tolerating the ivf  creat improving  may need further angio once creat stable, for distal perfusion    5/12 MK  - ARYA/CKD stage 4: improving, will dc IVF and mucomyst.  Lasix x1  - ANemia: fu the trend of the hgb s/p 2 units of prbc transfusion  - Cellulitis: on abx  - PVD: await further interventions by dr clement early next week.

## 2017-05-12 NOTE — PROGRESS NOTE ADULT - ASSESSMENT
anemia, likely multifactorial  I suspect that there is a component of GI bleeding, appears stabilized on PPI gtt and hct stable  He has received 5 units of packed red blood cells and getting 5th one now    change to protonix po bid later today    will delay EGD due to low o2 sat, DW family again        pt now more alert and awake, but is variable          DW Dr Henry,     Serial H&H'    tolerating diet          Lower extremity DVT, status post IVC filter    Sepsis likely secondary to lower extremity cellulitis and antibiotics will be continued.  Infectious disease note was appreciated.    Advanced COPD and obstructive sleep apnea reviewed.

## 2017-05-12 NOTE — PROGRESS NOTE ADULT - ASSESSMENT
severe PAD.  05/11 endarterectomy of CFA plaque in an attempt of limb preservation.  05/08 angiogram + angioplasy.  stenting of right common iliac + external iliac arteries.  statin.  VSx following.    chronic right LE stasis ulcers, dermatitis w/ cellulitis.  low-grade temps.  c/w cefepime.  ID following.    GIB.  acute blood loss anemia.  s/p 5 units PRBCs.  +/- EGD if respiratory status stabilizes.  c/w PPI gtt.  GI following.    pulmonary vascular congestion.  Lasix IV x 1.    ARYA upon CKD.  Cr close to baseline.  Nephrology following.    multiple DVT RUE/RLL.  s/p IVCF.

## 2017-05-12 NOTE — PROGRESS NOTE ADULT - ASSESSMENT
improved R foot perfusion and improving renal parameters, Hct stable    will continue observation and make decision re further intervention to improve distal perfusion next week

## 2017-05-12 NOTE — PROGRESS NOTE ADULT - SUBJECTIVE AND OBJECTIVE BOX
Patient is a 82y old  Male who presents with a chief complaint of weakness  HPI:  82 y/o male with h/of COPD, CHF, chronic renal failure, paroxysmal A-fib.with, hematochezia s/p bleeding scan and flex sigmoidoscopy, anemia, chronic LE stasis dermatitis  was was admitted for increased weakness and wheepy right LE and foot. The patient had a RLE ABDULAZIZ boot for chronic venous stasis ulcers and lower extremity chronic edema that was removed recently.. The daughter reports increased oozing from LE ulcers; she reports thet the right foot ulcers were debrided the day PTA. No fever or chills reported. In Ed patient noted to have Hb 6. He received vancomycin 1 gm IV x 1 and cefepime 1 gm IV x 1.    s/p angioplasty  Alert and verbal  Lying in bed in NAD  Denies pain    MEDICATIONS  (STANDING):  pantoprazole Infusion 8mG/Hr IV Continuous <Continuous>  buDESOnide   0.5 milliGRAM(s) Respule 0.5milliGRAM(s) Inhalation two times a day  doxazosin 8milliGRAM(s) Oral at bedtime  isosorbide   mononitrate ER Tablet (IMDUR) 120milliGRAM(s) Oral daily  diltiazem   CD 120milliGRAM(s) Oral daily  gabapentin 300milliGRAM(s) Oral daily  allopurinol 100milliGRAM(s) Oral daily  fenofibrate Tablet 145milliGRAM(s) Oral daily  simvastatin 10milliGRAM(s) Oral at bedtime  pramipexole 0.125milliGRAM(s) Oral three times a day  ALBUTerol    90 MICROgram(s) HFA Inhaler 2Puff(s) Inhalation every 6 hours  ammonium lactate 12% Lotion 1Application(s) Topical two times a day  fluticasone propionate 50 MICROgram(s)/spray Nasal Spray 1Spray(s) Both Nostrils two times a day  metoprolol 12.5milliGRAM(s) Oral two times a day  ondansetron Injectable 4milliGRAM(s) IV Push once  cefepime  IVPB 1000milliGRAM(s) IV Intermittent every 24 hours  sodium chloride 0.45%. 1000milliLiter(s) IV Continuous <Continuous>  hydrALAZINE 150milliGRAM(s) Oral two times a day  lactobacillus acidophilus 1Tablet(s) Oral daily  gabapentin 600milliGRAM(s) Oral at bedtime  acetylcysteine  Oral Solution 1200milliGRAM(s) Oral every 12 hours  ALBUTerol/ipratropium for Nebulization. 3milliLiter(s) Nebulizer once    MEDICATIONS  (PRN):  traMADol 100milliGRAM(s) Oral every 6 hours PRN Moderate Pain (4 - 6)  aluminum hydroxide/magnesium hydroxide/simethicone Suspension 30milliLiter(s) Oral every 6 hours PRN Dyspepsia  oxyCODONE  5 mG/acetaminophen 325 mG 1Tablet(s) Oral every 4 hours PRN Severe Pain (7 - 10)      Vital Signs Last 24 Hrs  T(C): 36.7, Max: 36.9 (05-11 @ 12:37)  T(F): 98, Max: 98.4 (05-11 @ 12:37)  HR: 81 (72 - 101)  BP: 148/45 (132/40 - 148/45)  BP(mean): --  RR: 19 (13 - 22)  SpO2: 98% (93% - 100%)    Physical Exam:        Constitutional: frail looking  HEENT: NC/AT, EOMI, PERRLA  Neck: supple  Back: no tenderness  Respiratory: few basal rales  Cardiovascular: S1S2 regular, no murmurs  Abdomen: soft, not tender, not distended, positive BS  Genitourinary: deferred  Rectal: deferred  Musculoskeletal: no muscle tenderness, no joint swelling or tenderness  Extremities: b/l chronic skin changes with edema; RLE: dorsal aspect of foot and lower ankle superficial ulcers with scant discharge; surrounding erythema and edema - improving; right foot feels warmer  Neurological: confused, moving all extremities, no focal deficits  Skin: no rashes    Labs:                        10.7   8.3   )-----------( 244      ( 12 May 2017 06:42 )             34.0     05-12    145  |  118<H>  |  95<H>  ----------------------------<  124<H>  5.4<H>   |  18<L>  |  2.09<H>    Ca    7.8<L>      12 May 2017 05:33                 10.8   8.2   )-----------( 231      ( 11 May 2017 12:55 )             33.7     05-11    146<H>  |  116<H>  |  112<H>  ----------------------------<  116<H>  5.1   |  18<L>  |  2.40<H>    Ca    7.6<L>      11 May 2017 12:55               8.3    10.1  )-----------( 282      ( 09 May 2017 04:53 )             25.0     05-09    141  |  111<H>  |  118<H>  ----------------------------<  90  5.2   |  19<L>  |  2.58<H>    Ca    7.9<L>      09 May 2017 04:53               7.4    13.7  )-----------( 354      ( 07 May 2017 06:47 )             23.9     05-07    148<H>  |  115<H>  |  136<H>  ----------------------------<  166<H>  4.8   |  22  |  3.42<H>    Ca    8.2<L>      07 May 2017 06:47                 8.3    14.5  )-----------( 370      ( 06 May 2017 04:43 )             26.7     05-06    153<H>  |  121<H>  |  142<H>  ----------------------------<  101<H>  5.0   |  20<L>  |  3.67<H>    Ca    8.2<L>      06 May 2017 04:43  Mg     2.8     05-05    TPro  6.6  /  Alb  2.4<L>  /  TBili  0.2  /  DBili  x   /  AST  20  /  ALT  15  /  AlkPhos  72  05-04               6.8    14.1  )-----------( 375      ( 05 May 2017 05:10 )             20.1     05-05    149<H>  |  118<H>  |  143<H>  ----------------------------<  109<H>  4.9   |  21<L>  |  3.68<H>    Ca    8.3<L>      05 May 2017 05:10  Mg     2.8     05-05    TPro  6.6  /  Alb  2.4<L>  /  TBili  0.2  /  DBili  x   /  AST  20  /  ALT  15  /  AlkPhos  72  05-04               6.3    10.8  )-----------( 398      ( 04 May 2017 12:54 )             21.8     05-04    145  |  112<H>  |  138<H>  ----------------------------<  123<H>  5.5<H>   |  19<L>  |  3.99<H>    Ca    8.8      04 May 2017 12:54    TPro  6.6  /  Alb  2.4<L>  /  TBili  0.2  /  DBili  x   /  AST  20  /  ALT  15  /  AlkPhos  72  05-04     LIVER FUNCTIONS - ( 04 May 2017 12:54 )  Alb: 2.4 g/dL / Pro: 6.6 gm/dL / ALK PHOS: 72 U/L / ALT: 15 U/L / AST: 20 U/L / GGT: x           Culture - Blood (05.04.17 @ 13:21)    Specimen Source: .Blood None    Culture Results:   No growth to date.        Radiology:    Advanced directives addressed: full resuscitation

## 2017-05-12 NOTE — CONSULT NOTE ADULT - ASSESSMENT
82 y/o male with h/of COPD, CHF, chronic renal failure, paroxysmal A-fib.with, hematochezia s/p bleeding scan and flex sigmoidoscopy, anemia, chronic LE stasis dermatitis  was was admitted for increased weakness and wheepy right LE and foot. The patient had a RLE ABDULAZIZ boot for chronic venous stasis ulcers and lower extremity chronic edema that was removed recently.. The daughter reports increased oozing from LE ulcers; she reports thet the right foot ulcers were debrided the day PTA. No fever or chills reported. In Ed patient noted to have Hb 6. He received vancomycin 1 gm IV x 1 and cefepime 1 gm IV x 1.    1. Chronic right LE stasis ulcers. Chronic dermatitis with likely superimposed cellulitis. Severe PVD. ARF. Severe anemia.  -obtain BC x 2  -agree with cefepime 1 gm IV qd  -monitor BMP, f/u vancomycin random level  -local wound care  -monitor temps  -f/u CBC  -consider vascular evaluation  -supportive care  2. Other issues:  -care per medicine
82 year old male with history of CAD s/p stents (LAD, RCA bare metal around 9/16), A.Fib not on anticoagulation due to GI bleeding, Hypertension, COPD on home O2 2L, SHAYY on nocturnal BIPAP, ex-smoker (smoked 1ppd X 50 years, quit 22 years ago),  Chronic diastolic CHF, Hyperlipidemia, PVD, Iron deficiency anemia, Chronic back pain, Gout, BPH, CKD III with baseline Cr 2, recently admitted to  in 4/17 with anemia of GI bleeding, RLE and RUE DVTs, received pRBCs and IVC filter discharged to rehab with aspirin was sent to  ER on 5/4/17 for worsening anemia with Hb 6, worsening leg pain from ulcers and worsening renal function Cr 3.99.  s/p vascular surgery, extubated and tolerated well  Advanced COPD on home o2  no active bronchospasm  CHF , appears clinically stable/ wt reported 215 lbs  SHAYY on Bipap  rest pulm dysfunction due to obesity  pulm status appears optimized if needs additional surgical interventions  data rev with Dr Clement today and Dtr at [facility]  suggest  repeat cxr today  incentive spirometry  bronchodilator rx  nocturnal and post op BIPAP useful  if lethargic or any new resp sxs will need ABG  thank you
GI bleed- From cardiology standpoint he had longstanding history of coronary artery disease. He underwent percutaneous coronary intervention and received a stent in the left anterior descending artery in 2002 and most recently, on September 28, 2016, he underwent rotational atherectomy and received an Integrity bare metal stent in the proximal and mid RCA.   He from cardiology standpoint can proceed with EGD if need be but currently he is febrile and elevated white count.  He might be developing sepsis or gangrene.    Recommend evaluation by ID and vascular surgery.    Altered mental status- likely multi factorial.   Check temperature and blood cultures and lactate level.  ID evaluation recommended.    Renal failure, azotemia- could be contributing to altered mental status.  Recommend nephrology evaluation.  From heart failure standpoint, volume status assessment is difficult in him with his body habitus and PVD with wounds in legs.    HTN- Hold off meds for now.    Hyperlipidemia- continue current meds.    Thank you for allowing me to participate in the care of this patient. Please feel free to contact me with any questions.
# ARYA due to pre-renal azothemia with CKD stage 4 at baseline ( 2.0-2.5)  # Anemia due to acute blood loss anemia  # Sepsis? due to LE ulcers  # Multiple DVT on RUE/RLE with s/p IVC fileter  # HTN    PLAN  - moniter off diuretics ( only inbetween blood transfusion) and fu response to the PRBC transfusion  - continue with current anti HTN  - strick I/O   - abx as per ID, pt given one dose of vanc in ER  family updated at bedside

## 2017-05-12 NOTE — PROGRESS NOTE ADULT - SUBJECTIVE AND OBJECTIVE BOX
NEPHROLOGY INTERVAL HPI/OVERNIGHT EVENTS:  doing well, more alert and oriented..  tolerating po well.      MEDICATIONS  (STANDING):  pantoprazole Infusion 8mG/Hr IV Continuous <Continuous>  buDESOnide   0.5 milliGRAM(s) Respule 0.5milliGRAM(s) Inhalation two times a day  doxazosin 8milliGRAM(s) Oral at bedtime  isosorbide   mononitrate ER Tablet (IMDUR) 120milliGRAM(s) Oral daily  diltiazem   CD 120milliGRAM(s) Oral daily  gabapentin 300milliGRAM(s) Oral daily  allopurinol 100milliGRAM(s) Oral daily  fenofibrate Tablet 145milliGRAM(s) Oral daily  simvastatin 10milliGRAM(s) Oral at bedtime  pramipexole 0.125milliGRAM(s) Oral three times a day  ALBUTerol    90 MICROgram(s) HFA Inhaler 2Puff(s) Inhalation every 6 hours  ammonium lactate 12% Lotion 1Application(s) Topical two times a day  fluticasone propionate 50 MICROgram(s)/spray Nasal Spray 1Spray(s) Both Nostrils two times a day  metoprolol 12.5milliGRAM(s) Oral two times a day  cefepime  IVPB 1000milliGRAM(s) IV Intermittent every 24 hours  hydrALAZINE 150milliGRAM(s) Oral two times a day  lactobacillus acidophilus 1Tablet(s) Oral daily  gabapentin 600milliGRAM(s) Oral at bedtime  ALBUTerol/ipratropium for Nebulization. 3milliLiter(s) Nebulizer once    MEDICATIONS  (PRN):  traMADol 100milliGRAM(s) Oral every 6 hours PRN Moderate Pain (4 - 6)  aluminum hydroxide/magnesium hydroxide/simethicone Suspension 30milliLiter(s) Oral every 6 hours PRN Dyspepsia  oxyCODONE  5 mG/acetaminophen 325 mG 1Tablet(s) Oral every 4 hours PRN Severe Pain (7 - 10)      Allergies    No Known Allergies    Intolerances        I&O's Detail  I & Os for 24h ending 12 May 2017 07:00  =============================================  IN:    Other: 1700 ml    sodium chloride 0.45%: 1258 ml    Oral Fluid: 720 ml    pantoprazole Infusion: 135.8 ml    IV PiggyBack: 50 ml    Total IN: 3863.8 ml  ---------------------------------------------  OUT:    Ureteral Catheter: 1125 ml    Other: 135 ml    Total OUT: 1260 ml  ---------------------------------------------  Total NET: 2603.8 ml    I & Os for current day (as of 12 May 2017 10:48)  =============================================  IN:    Oral Fluid: 240 ml    pantoprazole Infusion: 28 ml    Total IN: 268 ml  ---------------------------------------------  OUT:    Total OUT: 0 ml  ---------------------------------------------  Total NET: 268 ml        Vital Signs Last 24 Hrs  T(C): 36.7, Max: 36.9 ( @ 12:37)  T(F): 98, Max: 98.4 ( @ 12:37)  HR: 85 (72 - 101)  BP: 148/45 (132/40 - 148/45)  BP(mean): --  RR: 18 (13 - 22)  SpO2: 96% (93% - 100%)  Daily     Daily Weight in k.4 (12 May 2017 06:24)    PHYSICAL EXAM:  General: alert. awake Ox3  HEENT: MMM  CV: s1s2 rrr  LUNGS: B/L CTA  EXT: no edema    LABS:                        10.7   8.3   )-----------( 244      ( 12 May 2017 06:42 )             34.0     05-12    145  |  118<H>  |  95<H>  ----------------------------<  124<H>  5.4<H>   |  18<L>  |  2.09<H>    Ca    7.8<L>      12 May 2017 05:33

## 2017-05-12 NOTE — PROGRESS NOTE ADULT - SUBJECTIVE AND OBJECTIVE BOX
82 year old male with history of CAD s/p stents (LAD, RCA bare metal around 9/16), A.Fib not on anticoagulation due to GI bleeding, Hypertension, COPD on home O2 2L, SHAYY on nocturnal BIPAP, ex-smoker (smoked 1ppd X 50 years, quit 22 years ago),  Chronic diastolic CHF, Hyperlipidemia, PVD, Iron deficiency anemia, Chronic back pain, Gout, BPH, CKD III with baseline Cr 2, recently admitted to  in 4/17 with anemia of GI bleeding, RLE and RUE DVTs, received pRBCs and IVC filter discharged to rehab with aspirin was sent to  ER on 5/4/17 for worsening anemia with Hb 6, worsening leg pain from ulcers and worsening renal function Cr 3.99.    Pt seen bed side.  Not well oriented currently after receiving Dilaudid for pain in ER.  As per daughters, pt was walking with a walker at rehab and was doing better.  Pt was found to have tarry black stools in ER, guaic positive. (04 May 2017 15:56)    TD  05/12:  IS.  awake.  pleasant and comfortable.    MEDICATIONS  (STANDING):  buDESOnide   0.5 milliGRAM(s) Respule 0.5milliGRAM(s) Inhalation two times a day  doxazosin 8milliGRAM(s) Oral at bedtime  isosorbide   mononitrate ER Tablet (IMDUR) 120milliGRAM(s) Oral daily  diltiazem   CD 120milliGRAM(s) Oral daily  gabapentin 300milliGRAM(s) Oral daily  allopurinol 100milliGRAM(s) Oral daily  fenofibrate Tablet 145milliGRAM(s) Oral daily  simvastatin 10milliGRAM(s) Oral at bedtime  pramipexole 0.125milliGRAM(s) Oral three times a day  ALBUTerol    90 MICROgram(s) HFA Inhaler 2Puff(s) Inhalation every 6 hours  ammonium lactate 12% Lotion 1Application(s) Topical two times a day  fluticasone propionate 50 MICROgram(s)/spray Nasal Spray 1Spray(s) Both Nostrils two times a day  metoprolol 12.5milliGRAM(s) Oral two times a day  cefepime  IVPB 1000milliGRAM(s) IV Intermittent every 24 hours  hydrALAZINE 150milliGRAM(s) Oral two times a day  lactobacillus acidophilus 1Tablet(s) Oral daily  gabapentin 600milliGRAM(s) Oral at bedtime  pantoprazole  Injectable 40milliGRAM(s) IV Push every 12 hours    MEDICATIONS  (PRN):  traMADol 100milliGRAM(s) Oral every 6 hours PRN Moderate Pain (4 - 6)  aluminum hydroxide/magnesium hydroxide/simethicone Suspension 30milliLiter(s) Oral every 6 hours PRN Dyspepsia  oxyCODONE  5 mG/acetaminophen 325 mG 1Tablet(s) Oral every 4 hours PRN Severe Pain (7 - 10)    Vital Signs Last 24 Hrs  T(C): 36.7, Max: 36.7 (05-12 @ 06:24)  T(F): 98, Max: 98 (05-12 @ 06:24)  HR: 85 (75 - 92)  BP: --  BP(mean): --  RR: 16 (13 - 22)  SpO2: 94% (93% - 100%)    elderly wm NAD.  lungs clear.  heart regular.  no MRG.  soft.  obsese.  NTND.  (+) b/l dressing clean and dry.                        10.7   8.3   )-----------( 244      ( 12 May 2017 06:42 )             34.0       05-12    145  |  118<H>  |  95<H>  ----------------------------<  124<H>  5.4<H>   |  18<L>  |  2.09<H>    Ca    7.8<L>      12 May 2017 05:33      CARDIAC MARKERS ( 12 May 2017 05:33 )  0.026 ng/mL / x     / x     / x     / x      CARDIAC MARKERS ( 11 May 2017 12:55 )  0.033 ng/mL / x     / x     / x     / x        CXR Mild pulmonary vascular congestion and small left pleural   effusion has developed.

## 2017-05-13 LAB
ANION GAP SERPL CALC-SCNC: 10 MMOL/L — SIGNIFICANT CHANGE UP (ref 5–17)
BUN SERPL-MCNC: 96 MG/DL — HIGH (ref 7–23)
CALCIUM SERPL-MCNC: 8.1 MG/DL — LOW (ref 8.5–10.1)
CHLORIDE SERPL-SCNC: 120 MMOL/L — HIGH (ref 96–108)
CO2 SERPL-SCNC: 19 MMOL/L — LOW (ref 22–31)
CREAT SERPL-MCNC: 1.97 MG/DL — HIGH (ref 0.5–1.3)
GLUCOSE SERPL-MCNC: 110 MG/DL — HIGH (ref 70–99)
HCT VFR BLD CALC: 33.5 % — LOW (ref 39–50)
HGB BLD-MCNC: 10.4 G/DL — LOW (ref 13–17)
MCHC RBC-ENTMCNC: 29.1 PG — SIGNIFICANT CHANGE UP (ref 27–34)
MCHC RBC-ENTMCNC: 30.9 GM/DL — LOW (ref 32–36)
MCV RBC AUTO: 94 FL — SIGNIFICANT CHANGE UP (ref 80–100)
PLATELET # BLD AUTO: 246 K/UL — SIGNIFICANT CHANGE UP (ref 150–400)
POTASSIUM SERPL-MCNC: 5.1 MMOL/L — SIGNIFICANT CHANGE UP (ref 3.5–5.3)
POTASSIUM SERPL-SCNC: 5.1 MMOL/L — SIGNIFICANT CHANGE UP (ref 3.5–5.3)
RBC # BLD: 3.56 M/UL — LOW (ref 4.2–5.8)
RBC # FLD: 15.9 % — HIGH (ref 10.3–14.5)
SODIUM SERPL-SCNC: 149 MMOL/L — HIGH (ref 135–145)
WBC # BLD: 8.1 K/UL — SIGNIFICANT CHANGE UP (ref 3.8–10.5)
WBC # FLD AUTO: 8.1 K/UL — SIGNIFICANT CHANGE UP (ref 3.8–10.5)

## 2017-05-13 RX ADMIN — Medication 0.5 MILLIGRAM(S): at 19:58

## 2017-05-13 RX ADMIN — GABAPENTIN 300 MILLIGRAM(S): 400 CAPSULE ORAL at 12:48

## 2017-05-13 RX ADMIN — ISOSORBIDE MONONITRATE 120 MILLIGRAM(S): 60 TABLET, EXTENDED RELEASE ORAL at 12:48

## 2017-05-13 RX ADMIN — Medication 1 SPRAY(S): at 05:58

## 2017-05-13 RX ADMIN — PRAMIPEXOLE DIHYDROCHLORIDE 0.12 MILLIGRAM(S): 0.12 TABLET ORAL at 22:22

## 2017-05-13 RX ADMIN — Medication 100 MILLIGRAM(S): at 12:48

## 2017-05-13 RX ADMIN — ALBUTEROL 2 PUFF(S): 90 AEROSOL, METERED ORAL at 08:50

## 2017-05-13 RX ADMIN — SIMVASTATIN 10 MILLIGRAM(S): 20 TABLET, FILM COATED ORAL at 22:22

## 2017-05-13 RX ADMIN — PRAMIPEXOLE DIHYDROCHLORIDE 0.12 MILLIGRAM(S): 0.12 TABLET ORAL at 15:02

## 2017-05-13 RX ADMIN — ALBUTEROL 2 PUFF(S): 90 AEROSOL, METERED ORAL at 19:43

## 2017-05-13 RX ADMIN — PANTOPRAZOLE SODIUM 40 MILLIGRAM(S): 20 TABLET, DELAYED RELEASE ORAL at 17:22

## 2017-05-13 RX ADMIN — Medication 12.5 MILLIGRAM(S): at 17:20

## 2017-05-13 RX ADMIN — CEFEPIME 100 MILLIGRAM(S): 1 INJECTION, POWDER, FOR SOLUTION INTRAMUSCULAR; INTRAVENOUS at 17:23

## 2017-05-13 RX ADMIN — Medication 145 MILLIGRAM(S): at 12:48

## 2017-05-13 RX ADMIN — Medication 1 SPRAY(S): at 17:14

## 2017-05-13 RX ADMIN — PANTOPRAZOLE SODIUM 40 MILLIGRAM(S): 20 TABLET, DELAYED RELEASE ORAL at 05:35

## 2017-05-13 RX ADMIN — GABAPENTIN 600 MILLIGRAM(S): 400 CAPSULE ORAL at 22:22

## 2017-05-13 RX ADMIN — Medication 150 MILLIGRAM(S): at 05:37

## 2017-05-13 RX ADMIN — Medication 0.5 MILLIGRAM(S): at 08:50

## 2017-05-13 RX ADMIN — Medication 1 TABLET(S): at 12:48

## 2017-05-13 RX ADMIN — Medication 8 MILLIGRAM(S): at 22:22

## 2017-05-13 RX ADMIN — PRAMIPEXOLE DIHYDROCHLORIDE 0.12 MILLIGRAM(S): 0.12 TABLET ORAL at 05:36

## 2017-05-13 RX ADMIN — Medication 120 MILLIGRAM(S): at 05:34

## 2017-05-13 RX ADMIN — Medication 1 APPLICATION(S): at 05:59

## 2017-05-13 RX ADMIN — Medication 150 MILLIGRAM(S): at 17:20

## 2017-05-13 RX ADMIN — Medication 12.5 MILLIGRAM(S): at 05:34

## 2017-05-13 RX ADMIN — ALBUTEROL 2 PUFF(S): 90 AEROSOL, METERED ORAL at 03:06

## 2017-05-13 NOTE — PROGRESS NOTE ADULT - ASSESSMENT
83 yo male with complicated medical history, now postop. No evidence of GI bleeding with stable hct. Would maintain PPI and observe.

## 2017-05-13 NOTE — PROGRESS NOTE ADULT - ASSESSMENT
82 year old male with history of CAD s/p stents (LAD, RCA bare metal around 9/16), A.Fib not on anticoagulation due to GI bleeding, Hypertension, COPD on home O2 2L, SHAYY on nocturnal BIPAP, ex-smoker (smoked 1ppd X 50 years, quit 22 years ago),  Chronic diastolic CHF, Hyperlipidemia, PVD, Iron deficiency anemia, Chronic back pain, Gout, BPH, CKD III with baseline Cr 2, recently admitted to  in 4/17 with anemia of GI bleeding, RLE and RUE DVTs, received pRBCs and IVC filter discharged to rehab with aspirin was sent to  ER on 5/4/17 for worsening anemia with Hb 6, worsening leg pain from ulcers and worsening renal function Cr 3.99.  s/p vascular surgery, extubated and tolerated well  Advanced COPD on home o2  no active bronchospasm  CHF , appears clinically stable/ wt reported 215 lbs  SHAYY on Bipap  rest pulm dysfunction due to obesity  pulm status appears optimized if needs additional surgical interventions  data rev with Dr Clement today and Dtr at [facility]  suggest  repeat cxr today  incentive spirometry  bronchodilator rx  nocturnal and post op BIPAP useful  if lethargic or any new resp sxs will need ABG  good response to diuresis  cxr showed mild CHF and new small left sided effusion  diuresis as per renal  fu cxr in am  all dis with dtr at bedside

## 2017-05-13 NOTE — PROGRESS NOTE ADULT - SUBJECTIVE AND OBJECTIVE BOX
Patient is a 82y old  Male who presents with a chief complaint of Worsening leg pain, anemia, ARYA sent from Rehab (04 May 2017 15:56)      HPI:  82 year old male with history of CAD s/p stents (LAD, RCA bare metal around 9/16), A.Fib not on anticoagulation due to GI bleeding, Hypertension, COPD on home O2 2L, SHAYY on nocturnal BIPAP, ex-smoker (smoked 1ppd X 50 years, quit 22 years ago),  Chronic diastolic CHF, Hyperlipidemia, PVD, Iron deficiency anemia, Chronic back pain, Gout, BPH, CKD III with baseline Cr 2, recently admitted to  in 4/17 with anemia of GI bleeding, RLE and RUE DVTs, received pRBCs and IVC filter discharged to rehab with aspirin was sent to  ER on 5/4/17 for worsening anemia with Hb 6, worsening leg pain from ulcers and worsening renal function Cr 3.99.    Pt seen bed side.  Not well oriented currently after receiving Dilaudid for pain in ER.  As per daughters, pt was walking with a walker at rehab and was doing better.  Pt was found to have tarry black stools in ER, guaic positive. (04 May 2017 15:56)    data rev with pt's RN and DTR at bedside today  tolerated his vascular surgery well , extubated and is having bkfst in bed this am  no cp  no sig cough or wheeze  uses his biapp at night    5/13  needed diuresis yesterday  feels better today  DTr at bedside  uses incentive spirometry  no cp  PAST MEDICAL & SURGICAL HISTORY:  Sepsis, due to unspecified organism: 2/2 poorly healing wounds b/l  BPH (benign prostatic hypertrophy)  Hyperlipemia  Coronary artery disease  Hypertension  Dyspepsia: On moderate exertion.  Sleep apnea, obstructive: Requires home 02 therapy, and treatment with BIPAP  Atelectasis  Pleural effusion, bilateral  Respiratory failure  Peripheral edema  CRI (chronic renal insufficiency)  Gout  CRF (chronic renal failure)  Benign prostatic hypertrophy  Spinal stenosis  Hypercholesterolemia  GERD (gastroesophageal reflux disease)  CAD (coronary artery disease)  Hypertension  S/P angioplasty with stent  Cataract of left eye  Prostate: Surgery green light procedure.  S/P rotator cuff surgery: Right  S/P angioplasty  Rotator cuff tear, right: repair      PREVIOUS DIAGNOSTIC TESTING:      MEDICATIONS  (STANDING):  pantoprazole Infusion 8mG/Hr IV Continuous <Continuous>  buDESOnide   0.5 milliGRAM(s) Respule 0.5milliGRAM(s) Inhalation two times a day  doxazosin 8milliGRAM(s) Oral at bedtime  isosorbide   mononitrate ER Tablet (IMDUR) 120milliGRAM(s) Oral daily  diltiazem   CD 120milliGRAM(s) Oral daily  gabapentin 300milliGRAM(s) Oral daily  allopurinol 100milliGRAM(s) Oral daily  fenofibrate Tablet 145milliGRAM(s) Oral daily  simvastatin 10milliGRAM(s) Oral at bedtime  pramipexole 0.125milliGRAM(s) Oral three times a day  ALBUTerol    90 MICROgram(s) HFA Inhaler 2Puff(s) Inhalation every 6 hours  ammonium lactate 12% Lotion 1Application(s) Topical two times a day  fluticasone propionate 50 MICROgram(s)/spray Nasal Spray 1Spray(s) Both Nostrils two times a day  metoprolol 12.5milliGRAM(s) Oral two times a day  ondansetron Injectable 4milliGRAM(s) IV Push once  cefepime  IVPB 1000milliGRAM(s) IV Intermittent every 24 hours  sodium chloride 0.45%. 1000milliLiter(s) IV Continuous <Continuous>  hydrALAZINE 150milliGRAM(s) Oral two times a day  lactobacillus acidophilus 1Tablet(s) Oral daily  gabapentin 600milliGRAM(s) Oral at bedtime  acetylcysteine  Oral Solution 1200milliGRAM(s) Oral every 12 hours    MEDICATIONS  (PRN):  traMADol 100milliGRAM(s) Oral every 6 hours PRN Moderate Pain (4 - 6)  aluminum hydroxide/magnesium hydroxide/simethicone Suspension 30milliLiter(s) Oral every 6 hours PRN Dyspepsia  oxyCODONE  5 mG/acetaminophen 325 mG 1Tablet(s) Oral every 4 hours PRN Severe Pain (7 - 10)      FAMILY HISTORY:  No pertinent family history in first degree relatives      SOCIAL HISTORY:  ***    REVIEW OF SYSTEM:  Pertinent items are noted in HPI.  Constitutional negative for chills, fevers, sweats and weight loss  throat, and face:  negative for epistaxis, nasal congestion, sore throat and   tinnitus  Respiratory:pos dyspnea on exertion, pleuritic chest pain  and wheezing  Cardiovascular:  negative for chest pain, dyspnea and palpitations  Gastrointestinal: negative for abdominal pain, diarrhea, nausea and vomiting  Genitourinary: negative for dysuria, frequency and urinary incontinence  Skin:  negative for redness, rash, pruritus, swelling, dryness and   fissures  Hematologic/lymphatic: negative for bleeding and easy bruising  Musculoskeletal: posfor arthralgias, back pain and muscle weakness  Neurological: negative for dizziness, headaches, seizures and tremors  Behavioral/Psych:  negative for mood change, depression, suicidal attempts      Vital Signs Last 24 Hrs  T(C): 36.7, Max: 36.9 (05-11 @ 12:37)  T(F): 98, Max: 98.4 (05-11 @ 12:37)  HR: 81 (72 - 101)  BP: 148/45 (132/40 - 148/45)  BP(mean): --  RR: 19 (13 - 22)  SpO2: 98% (93% - 100%)    I&O's Summary    I & Os for current day (as of 12 May 2017 08:38)  =============================================  IN: 3863.8 ml / OUT: 1260 ml / NET: 2603.8 ml    PHYSICAL EXAM  General Appearance: cooperative, no acute distress,   HEENT: PERRL, conjunctiva clear, EOM's intact,   Neck: Supple, , no adenopathy, thyroid: not enlarged, no carotid bruit or JVD  Back: Symmetric, no  tenderness,no soft tissue tenderness  Lungs: Clear to auscultation bilateral,no adventitious breath sounds, normal   expiratory phase, few bibasilar rales  Heart: Regular rate and rhythm, S1, S2 normal, no murmur, rub or gallop  Abdomen: Soft, non-tender, bowel sounds active , no hepatosplenomegaly  Extremities:cyanotic right toes, chrnic peripheral/ distal ulcerations/ peripheral edema  Skin: Skin color, texture normal, no rashes   Neurologic: Alert and oriented X3 , cranial nerves intact, sensory and motor normal,    ECG:    LABS:                          10.7   8.3   )-----------( 244      ( 12 May 2017 06:42 )             34.0     05-12    145  |  118<H>  |  95<H>  ----------------------------<  124<H>  5.4<H>   |  18<L>  |  2.09<H>    Ca    7.8<L>      12 May 2017 05:33      CARDIAC MARKERS ( 12 May 2017 05:33 )  0.026 ng/mL / x     / x     / x     / x      CARDIAC MARKERS ( 11 May 2017 12:55 )  0.033 ng/mL / x     / x     / x     / x              PROCEDURE DATE:  05/04/2017        INTERPRETATION:  History: GI bleed admission    Chest:  one view.      Comparison: 3/31/2017    AP radiograph of the chest demonstrates no evidence of infiltrate,   pleural effusion or vascular congestion. No atelectasis is seen. The   cardiac silhouette is normal in size. Osseous structures are intact.    Impression: No active pulmonary disease.    Contrast:     Oral contrast only    Comparison: CT abdomen and pelvis 6/13/2016    Findings:  LIVER: Normal.  SPLEEN: Normal.  PANCREAS: Normal.  GALLBLADDER/BILIARY TREE: Nondilated. Gallstone is present.  ADRENALS: Normal.  KIDNEYS: No calcification, hydronephrosis, or soft tissue attenuating   renal mass.  LYMPHADENOPATHY/RETROPERITONEUM: No adenopathy.  VASCULATURE: Extensive aortoiliac vascular calcification without   aneurysm. Infrarenal vena cava filter in place.  BOWEL: No bowel related abnormality. Specifically, no bowel obstruction.  PELVIC VISCERA: Calcified BPH in the prostate is noted.  PELVIC LYMPH NODES: No pelvic adenopathy.  PERITONEUM/ABDOMINAL WALL: No free air orascites.  SKELETAL: No acute bony abnormality.  LUNG BASES: Clear.    IMPRESSION:     Preponderance of abdominal visceral adipose tissue without evidence for   obstruction, mass, or ascites.          RADIOLOGY & ADDITIONAL STUDIES:  cxr noted and results discussed with pt and dtr today  5/12  mild PVM and small left sided effusion    PROCEDURE DATE:  05/12/2017        INTERPRETATION:  CHEST SINGLE VIEW FRONTAL    Single AP view    HISTORY:  preop    Comparison:  Chest x-ray 5/4/2017    The cardiac silhouette is within normal limits. Mild pulmonary vascular   congestion and small left effusion.    IMPRESSION: Mild pulmonary vascular congestion and small left pleural   effusion has developed.

## 2017-05-13 NOTE — PROGRESS NOTE ADULT - SUBJECTIVE AND OBJECTIVE BOX
NEPHROLOGY INTERVAL HPI/OVERNIGHT EVENTS:    HPI:  82 year old male with history of CAD s/p stents (LAD, RCA bare metal around 9/16), A.Fib not on anticoagulation due to GI bleeding, Hypertension, COPD on home O2 2L, SHAYY on nocturnal BIPAP, ex-smoker (smoked 1ppd X 50 years, quit 22 years ago),  Chronic diastolic CHF, Hyperlipidemia, PVD, Iron deficiency anemia, Chronic back pain, Gout, BPH, CKD III with baseline Cr 2, recently admitted to  in 4/17 with anemia of GI bleeding, RLE and RUE DVTs, received pRBCs and IVC filter discharged to rehab with aspirin was sent to  ER on 5/4/17 for worsening anemia with Hb 6, worsening leg pain from ulcers and worsening renal function Cr 3.99.    Pt seen bed side.  Not well oriented currently after receiving Dilaudid for pain in ER.  As per daughters, pt was walking with a walker at rehab and was doing better.  Pt was found to have tarry black stools in ER, guaic positive. (04 May 2017 15:56)    5/13  feels well   no new complaints good appetite      PAST MEDICAL & SURGICAL HISTORY:  Sepsis, due to unspecified organism: 2/2 poorly healing wounds b/l  BPH (benign prostatic hypertrophy)  Hyperlipemia  Coronary artery disease  Hypertension  Dyspepsia: On moderate exertion.  Sleep apnea, obstructive: Requires home 02 therapy, and treatment with BIPAP  Atelectasis  Pleural effusion, bilateral  Respiratory failure  Peripheral edema  CRI (chronic renal insufficiency)  Gout  CRF (chronic renal failure)  Benign prostatic hypertrophy  Spinal stenosis  Hypercholesterolemia  GERD (gastroesophageal reflux disease)  CAD (coronary artery disease)  Hypertension  S/P angioplasty with stent  Cataract of left eye  Prostate: Surgery green light procedure.  S/P rotator cuff surgery: Right  S/P angioplasty  Rotator cuff tear, right: repair      FAMILY HISTORY:  No pertinent family history in first degree relatives      MEDICATIONS  (STANDING):  buDESOnide   0.5 milliGRAM(s) Respule 0.5milliGRAM(s) Inhalation two times a day  doxazosin 8milliGRAM(s) Oral at bedtime  isosorbide   mononitrate ER Tablet (IMDUR) 120milliGRAM(s) Oral daily  diltiazem   CD 120milliGRAM(s) Oral daily  gabapentin 300milliGRAM(s) Oral daily  allopurinol 100milliGRAM(s) Oral daily  fenofibrate Tablet 145milliGRAM(s) Oral daily  simvastatin 10milliGRAM(s) Oral at bedtime  pramipexole 0.125milliGRAM(s) Oral three times a day  ALBUTerol    90 MICROgram(s) HFA Inhaler 2Puff(s) Inhalation every 6 hours  ammonium lactate 12% Lotion 1Application(s) Topical two times a day  fluticasone propionate 50 MICROgram(s)/spray Nasal Spray 1Spray(s) Both Nostrils two times a day  metoprolol 12.5milliGRAM(s) Oral two times a day  cefepime  IVPB 1000milliGRAM(s) IV Intermittent every 24 hours  hydrALAZINE 150milliGRAM(s) Oral two times a day  lactobacillus acidophilus 1Tablet(s) Oral daily  gabapentin 600milliGRAM(s) Oral at bedtime  pantoprazole  Injectable 40milliGRAM(s) IV Push every 12 hours    MEDICATIONS  (PRN):  traMADol 100milliGRAM(s) Oral every 6 hours PRN Moderate Pain (4 - 6)  aluminum hydroxide/magnesium hydroxide/simethicone Suspension 30milliLiter(s) Oral every 6 hours PRN Dyspepsia  oxyCODONE  5 mG/acetaminophen 325 mG 1Tablet(s) Oral every 4 hours PRN Severe Pain (7 - 10)      Allergies    No Known Allergies    Intolerances        I&O's Summary    I & Os for current day (as of 13 May 2017 15:08)  =============================================  IN: 1134 ml / OUT: 1575 ml / NET: -441 ml        REVIEW OF SYSTEMS:    CONSTITUTIONAL:  As per HPI.    HEENT:  Eyes:  No diplopia or blurred vision. ENT:  No earache, sore throat or runny nose.    CARDIOVASCULAR:  No pressure, squeezing, strangling, tightness, heaviness or aching about the chest, neck, axilla or epigastrium.    RESPIRATORY:  No cough, shortness of breath, PND or orthopnea.    GASTROINTESTINAL:  No nausea, vomiting or diarrhea.    GENITOURINARY:  No dysuria, frequency or urgency.    MUSCULOSKELETAL:  As per HPI.    SKIN:  No change in skin, hair or nails.    NEUROLOGIC:  No paresthesias, fasciculations, seizures or weakness.    PSYCHIATRIC:  No disorder of thought or mood.    ENDOCRINE:  No heat or cold intolerance, polyuria or polydipsia.    HEMATOLOGICAL:  No easy bruising or bleeding.      Vital Signs Last 24 Hrs  T(C): 37.1, Max: 37.4 (05-13 @ 04:36)  T(F): 98.7, Max: 99.3 (05-13 @ 04:36)  HR: 94 (82 - 96)  BP: --  BP(mean): --  RR: 20 (14 - 23)  SpO2: 97% (85% - 98%)  Daily     Daily     PHYSICAL EXAM:    General:  Alert, well-developed ,No acute distress.    Neuro:  Alert and oriented to person, place, and time. Able to communicate  well. Cranial nerves 2-12 grossly intact. 5/5 strength in all  extremities bilaterally. Sensation intact in all extremities.  Appropriate affect.     HEENT:  No JVD, no masses, Eyes anicteric, No carotid bruits.No lymphadenopathy,    Cardiovascular:  Regular rate and rhythm, with normal S1 and S2. No murmurs, rubs,  or gallops. No JVD.    Lungs:  Lungs clear. no rales, no wheezing, .    Abdomen:  Normoactive bowel sounds. Soft, flat, non-tender, and non-distended.  No hepatosplenomegaly, positive bowel sounds    Skin:  Warm, dry, well-perfused. No rashes or other lesions.     Extremities:  legs wrapped.    LABS:                        10.4   8.1   )-----------( 246      ( 13 May 2017 05:14 )             33.5     05-13    149<H>  |  120<H>  |  96<H>  ----------------------------<  110<H>  5.1   |  19<L>  |  1.97<H>    Ca    8.1<L>      13 May 2017 05:14

## 2017-05-13 NOTE — PROGRESS NOTE ADULT - SUBJECTIVE AND OBJECTIVE BOX
82 year old male with history of CAD s/p stents (LAD, RCA bare metal around 9/16), A.Fib not on anticoagulation due to GI bleeding, Hypertension, COPD on home O2 2L, SHAYY on nocturnal BIPAP, ex-smoker (smoked 1ppd X 50 years, quit 22 years ago),  Chronic diastolic CHF, Hyperlipidemia, PVD, Iron deficiency anemia, Chronic back pain, Gout, BPH, CKD III with baseline Cr 2, recently admitted to  in 4/17 with anemia of GI bleeding, RLE and RUE DVTs, received pRBCs and IVC filter discharged to rehab with aspirin was sent to  ER on 5/4/17 for worsening anemia with Hb 6, worsening leg pain from ulcers and worsening renal function Cr 3.99.    Pt seen bed side.  Not well oriented currently after receiving Dilaudid for pain in ER.  As per daughters, pt was walking with a walker at rehab and was doing better.  Pt was found to have tarry black stools in ER, guaic positive. (04 May 2017 15:56)    TD  05/12:  IS.  awake.  pleasant and comfortable.  5/13/17 no new events    MEDICATIONS  (STANDING):  buDESOnide   0.5 milliGRAM(s) Respule 0.5milliGRAM(s) Inhalation two times a day  doxazosin 8milliGRAM(s) Oral at bedtime  isosorbide   mononitrate ER Tablet (IMDUR) 120milliGRAM(s) Oral daily  diltiazem   CD 120milliGRAM(s) Oral daily  gabapentin 300milliGRAM(s) Oral daily  allopurinol 100milliGRAM(s) Oral daily  fenofibrate Tablet 145milliGRAM(s) Oral daily  simvastatin 10milliGRAM(s) Oral at bedtime  pramipexole 0.125milliGRAM(s) Oral three times a day  ALBUTerol    90 MICROgram(s) HFA Inhaler 2Puff(s) Inhalation every 6 hours  ammonium lactate 12% Lotion 1Application(s) Topical two times a day  fluticasone propionate 50 MICROgram(s)/spray Nasal Spray 1Spray(s) Both Nostrils two times a day  metoprolol 12.5milliGRAM(s) Oral two times a day  cefepime  IVPB 1000milliGRAM(s) IV Intermittent every 24 hours  hydrALAZINE 150milliGRAM(s) Oral two times a day  lactobacillus acidophilus 1Tablet(s) Oral daily  gabapentin 600milliGRAM(s) Oral at bedtime  pantoprazole  Injectable 40milliGRAM(s) IV Push every 12 hours    MEDICATIONS  (PRN):  traMADol 100milliGRAM(s) Oral every 6 hours PRN Moderate Pain (4 - 6)  aluminum hydroxide/magnesium hydroxide/simethicone Suspension 30milliLiter(s) Oral every 6 hours PRN Dyspepsia  oxyCODONE  5 mG/acetaminophen 325 mG 1Tablet(s) Oral every 4 hours PRN Severe Pain (7 - 10)    Vital Signs Last 24 Hrs  T(C): 37.1, Max: 37.4 (05-13 @ 04:36)  T(F): 98.7, Max: 99.3 (05-13 @ 04:36)  HR: 94 (82 - 96)  BP: --  BP(mean): --  RR: 20 (14 - 23)  SpO2: 97% (85% - 98%)    Physical exam:  Appears chronically ill  Lungs- decrease air entry at bases  S1S2  Abd- soft, NT, BS+  Ext- dressing both legs, RLE- black escar on the top of the foot  Neuro- AAOx3    LABS:                                 10.4   8.1   )-----------( 246      ( 13 May 2017 05:14 )             33.5     13 May 2017 05:14    149    |  120    |  96     ----------------------------<  110    5.1     |  19     |  1.97     Ca    8.1        13 May 2017 05:14    CARDIAC MARKERS ( 12 May 2017 05:33 )  0.026 ng/mL / x     / x     / x     / x        Assessment/Plan:  #severe PAD.  05/11 endarterectomy of CFA plaque in an attempt of limb preservation.  05/08 angiogram + angioplasy.  stenting of right common iliac + external iliac arteries.  statin.   Further plan as per DR Clement    #chronic right LE stasis ulcers, dermatitis w/ cellulitis.  low-grade temps.  c/w cefepime.  ID following.    #GIB/acute blood loss anemia.  s/p 5 units PRBCs.  +/- EGD if respiratory status stabilizes.  Cont PPI  GI f/u appreciated    #pulmonary vascular congestion.  Lasix IV x 1.    #ARYA upon CKD.  Cr close to baseline.  Nephrology following.    #multiple DVT RUE/RLL.  s/p IVCF.    #Dispo- observe over the weekend. Further plan as per DR Clement

## 2017-05-13 NOTE — PROGRESS NOTE ADULT - SUBJECTIVE AND OBJECTIVE BOX
Patient is a 82y old  Male who presents with a chief complaint of Worsening leg pain, anemia, ARYA sent from Rehab (04 May 2017 15:56)      HPI:  82 year old male with history of CAD s/p stents (LAD, RCA bare metal around 9/16), A.Fib not on anticoagulation due to GI bleeding, Hypertension, COPD on home O2 2L, SHAYY on nocturnal BIPAP, ex-smoker (smoked 1ppd X 50 years, quit 22 years ago),  Chronic diastolic CHF, Hyperlipidemia, PVD, Iron deficiency anemia, Chronic back pain, Gout, BPH, CKD III with baseline Cr 2, recently admitted to  in 4/17 with anemia of GI bleeding, RLE and RUE DVTs, received pRBCs and IVC filter discharged to rehab with aspirin was sent to  ER on 5/4/17 for worsening anemia with Hb 6, worsening leg pain from ulcers and worsening renal function Cr 3.99.    Pt seen bed side.  Not well oriented currently after receiving Dilaudid for pain in ER.  As per daughters, pt was walking with a walker at rehab and was doing better.  Pt was found to have tarry black stools in ER, guaic positive. (04 May 2017 15:56)    No bleeding overnight. Hct stable.      PAST MEDICAL & SURGICAL HISTORY:  Sepsis, due to unspecified organism: 2/2 poorly healing wounds b/l  BPH (benign prostatic hypertrophy)  Hyperlipemia  Coronary artery disease  Hypertension  Dyspepsia: On moderate exertion.  Sleep apnea, obstructive: Requires home 02 therapy, and treatment with BIPAP  Atelectasis  Pleural effusion, bilateral  Respiratory failure  Peripheral edema  CRI (chronic renal insufficiency)  Gout  CRF (chronic renal failure)  Benign prostatic hypertrophy  Spinal stenosis  Hypercholesterolemia  GERD (gastroesophageal reflux disease)  CAD (coronary artery disease)  Hypertension  S/P angioplasty with stent  Cataract of left eye  Prostate: Surgery green light procedure.  S/P rotator cuff surgery: Right  S/P angioplasty  Rotator cuff tear, right: repair      MEDICATIONS  (STANDING):  buDESOnide   0.5 milliGRAM(s) Respule 0.5milliGRAM(s) Inhalation two times a day  doxazosin 8milliGRAM(s) Oral at bedtime  isosorbide   mononitrate ER Tablet (IMDUR) 120milliGRAM(s) Oral daily  diltiazem   CD 120milliGRAM(s) Oral daily  gabapentin 300milliGRAM(s) Oral daily  allopurinol 100milliGRAM(s) Oral daily  fenofibrate Tablet 145milliGRAM(s) Oral daily  simvastatin 10milliGRAM(s) Oral at bedtime  pramipexole 0.125milliGRAM(s) Oral three times a day  ALBUTerol    90 MICROgram(s) HFA Inhaler 2Puff(s) Inhalation every 6 hours  ammonium lactate 12% Lotion 1Application(s) Topical two times a day  fluticasone propionate 50 MICROgram(s)/spray Nasal Spray 1Spray(s) Both Nostrils two times a day  metoprolol 12.5milliGRAM(s) Oral two times a day  cefepime  IVPB 1000milliGRAM(s) IV Intermittent every 24 hours  hydrALAZINE 150milliGRAM(s) Oral two times a day  lactobacillus acidophilus 1Tablet(s) Oral daily  gabapentin 600milliGRAM(s) Oral at bedtime  pantoprazole  Injectable 40milliGRAM(s) IV Push every 12 hours    MEDICATIONS  (PRN):  traMADol 100milliGRAM(s) Oral every 6 hours PRN Moderate Pain (4 - 6)  aluminum hydroxide/magnesium hydroxide/simethicone Suspension 30milliLiter(s) Oral every 6 hours PRN Dyspepsia  oxyCODONE  5 mG/acetaminophen 325 mG 1Tablet(s) Oral every 4 hours PRN Severe Pain (7 - 10)      Allergies    No Known Allergies    Intolerances        REVIEW OF SYSTEMS:    CONSTITUTIONAL: No weakness, fevers or chills  RESPIRATORY: No cough, wheezing, hemoptysis; No shortness of breath  CARDIOVASCULAR: No chest pain or palpitations  GASTROINTESTINAL: No abdominal or epigastric pain. No nausea, vomiting, or hematemesis; No diarrhea or constipation. No melena or hematochezia.  All other review of systems is negative unless indicated above.    Vital Signs Last 24 Hrs  T(C): 37.1, Max: 37.4 (05-13 @ 04:36)  T(F): 98.7, Max: 99.3 (05-13 @ 04:36)  HR: 94 (82 - 96)  BP: --  BP(mean): --  RR: 20 (14 - 23)  SpO2: 97% (85% - 98%)    PHYSICAL EXAM:    Respiratory: CTAB  Cardiovascular: S1 and S2, RRR, no M/G/R  Gastrointestinal: BS+, soft, NT/ND  LABS:                        10.4   8.1   )-----------( 246      ( 13 May 2017 05:14 )             33.5     05-13    149<H>  |  120<H>  |  96<H>  ----------------------------<  110<H>  5.1   |  19<L>  |  1.97<H>    Ca    8.1<L>      13 May 2017 05:14            RADIOLOGY & ADDITIONAL STUDIES:

## 2017-05-13 NOTE — PROGRESS NOTE ADULT - ASSESSMENT
# ARYA due to pre-renal azothemia with CKD stage 4 at baseline ( 2.0-2.5)  # Anemia due to acute blood loss anemia  # Sepsis? due to LE ulcers  # Multiple DVT on RUE/RLE with s/p IVC fileter  # HTN    PLAN  - moniter off diuretics ( only inbetween blood transfusion) and fu response to the PRBC transfusion  - continue with current anti HTN  - strick I/O   - abx as per ID, pt given one dose of vanc in ER  family updated at bedside    5/9 SY  --ARYA/CKD    Creat now stabilized.  Continue to monitor for now post angiogram.  Continue current IVF for another day.  --PVD  Post Stent placement in Right common and external iliac arteries.  Continue to monitor wounds.  --Electrolytes improved and stable.    5/10 MK  - ARYA/CKd stage 4 at baseline, for now hold diureitcs  - PVD for repeat intervention tomorrow, will continue with mucomyst and IVf at current rate  - Cellulitis: abx as per ID  - Anemia : for now symptomatic treatment.  transfusion of 2 unit of PRBC with lasix inbetween.  IVF on hold and restart when his NPO status starts  dw dr damico, daughter and RN    5/11    back from the OR  D/W Dr Clement, s/o removal of large hard plaque from the common femoral artery..  will cont iv hydration  tolerating the ivf  creat improving  may need further angio once creat stable, for distal perfusion    5/12 MK  - ARYA/CKD stage 4: improving, will dc IVF and mucomyst.  Lasix x1  - ANemia: fu the trend of the hgb s/p 2 units of prbc transfusion  - Cellulitis: on abx  - PVD: await further interventions by dr clement early next week.      5/13  feels well   daughter at bedside  Na elevated chronically, component of DI?  encourage po free water  creat improving

## 2017-05-14 PROCEDURE — 71010: CPT | Mod: 26

## 2017-05-14 RX ADMIN — Medication 0.5 MILLIGRAM(S): at 10:02

## 2017-05-14 RX ADMIN — GABAPENTIN 300 MILLIGRAM(S): 400 CAPSULE ORAL at 12:18

## 2017-05-14 RX ADMIN — PRAMIPEXOLE DIHYDROCHLORIDE 0.12 MILLIGRAM(S): 0.12 TABLET ORAL at 14:17

## 2017-05-14 RX ADMIN — Medication 120 MILLIGRAM(S): at 05:26

## 2017-05-14 RX ADMIN — Medication 150 MILLIGRAM(S): at 05:09

## 2017-05-14 RX ADMIN — Medication 12.5 MILLIGRAM(S): at 05:09

## 2017-05-14 RX ADMIN — Medication 8 MILLIGRAM(S): at 22:14

## 2017-05-14 RX ADMIN — PANTOPRAZOLE SODIUM 40 MILLIGRAM(S): 20 TABLET, DELAYED RELEASE ORAL at 17:18

## 2017-05-14 RX ADMIN — ISOSORBIDE MONONITRATE 120 MILLIGRAM(S): 60 TABLET, EXTENDED RELEASE ORAL at 12:18

## 2017-05-14 RX ADMIN — ALBUTEROL 2 PUFF(S): 90 AEROSOL, METERED ORAL at 10:03

## 2017-05-14 RX ADMIN — Medication 145 MILLIGRAM(S): at 12:18

## 2017-05-14 RX ADMIN — PRAMIPEXOLE DIHYDROCHLORIDE 0.12 MILLIGRAM(S): 0.12 TABLET ORAL at 22:15

## 2017-05-14 RX ADMIN — Medication 1 TABLET(S): at 12:18

## 2017-05-14 RX ADMIN — ALBUTEROL 2 PUFF(S): 90 AEROSOL, METERED ORAL at 19:24

## 2017-05-14 RX ADMIN — Medication 1 APPLICATION(S): at 05:24

## 2017-05-14 RX ADMIN — Medication 1 SPRAY(S): at 05:25

## 2017-05-14 RX ADMIN — Medication 1 SPRAY(S): at 17:02

## 2017-05-14 RX ADMIN — Medication 150 MILLIGRAM(S): at 17:18

## 2017-05-14 RX ADMIN — Medication 0.5 MILLIGRAM(S): at 19:14

## 2017-05-14 RX ADMIN — PANTOPRAZOLE SODIUM 40 MILLIGRAM(S): 20 TABLET, DELAYED RELEASE ORAL at 05:09

## 2017-05-14 RX ADMIN — Medication 100 MILLIGRAM(S): at 12:18

## 2017-05-14 RX ADMIN — Medication 12.5 MILLIGRAM(S): at 17:19

## 2017-05-14 RX ADMIN — CEFEPIME 100 MILLIGRAM(S): 1 INJECTION, POWDER, FOR SOLUTION INTRAMUSCULAR; INTRAVENOUS at 17:28

## 2017-05-14 RX ADMIN — SIMVASTATIN 10 MILLIGRAM(S): 20 TABLET, FILM COATED ORAL at 22:15

## 2017-05-14 RX ADMIN — GABAPENTIN 600 MILLIGRAM(S): 400 CAPSULE ORAL at 22:14

## 2017-05-14 RX ADMIN — PRAMIPEXOLE DIHYDROCHLORIDE 0.12 MILLIGRAM(S): 0.12 TABLET ORAL at 05:09

## 2017-05-14 RX ADMIN — Medication 1 APPLICATION(S): at 17:02

## 2017-05-14 RX ADMIN — ALBUTEROL 2 PUFF(S): 90 AEROSOL, METERED ORAL at 14:19

## 2017-05-14 NOTE — PROGRESS NOTE ADULT - SUBJECTIVE AND OBJECTIVE BOX
Patient is a 82y old  Male who presents with a chief complaint of Worsening leg pain, anemia, ARYA sent from Rehab (04 May 2017 15:56)      Subective:  Feels good. No further bleeding    PAST MEDICAL & SURGICAL HISTORY:  Sepsis, due to unspecified organism: 2/2 poorly healing wounds b/l  BPH (benign prostatic hypertrophy)  Hyperlipemia  Coronary artery disease  Hypertension  Dyspepsia: On moderate exertion.  Sleep apnea, obstructive: Requires home 02 therapy, and treatment with BIPAP  Atelectasis  Pleural effusion, bilateral  Respiratory failure  Peripheral edema  CRI (chronic renal insufficiency)  Gout  CRF (chronic renal failure)  Benign prostatic hypertrophy  Spinal stenosis  Hypercholesterolemia  GERD (gastroesophageal reflux disease)  CAD (coronary artery disease)  Hypertension  S/P angioplasty with stent  Cataract of left eye  Prostate: Surgery green light procedure.  S/P rotator cuff surgery: Right  S/P angioplasty  Rotator cuff tear, right: repair      MEDICATIONS  (STANDING):  buDESOnide   0.5 milliGRAM(s) Respule 0.5milliGRAM(s) Inhalation two times a day  doxazosin 8milliGRAM(s) Oral at bedtime  isosorbide   mononitrate ER Tablet (IMDUR) 120milliGRAM(s) Oral daily  diltiazem   CD 120milliGRAM(s) Oral daily  gabapentin 300milliGRAM(s) Oral daily  allopurinol 100milliGRAM(s) Oral daily  fenofibrate Tablet 145milliGRAM(s) Oral daily  simvastatin 10milliGRAM(s) Oral at bedtime  pramipexole 0.125milliGRAM(s) Oral three times a day  ALBUTerol    90 MICROgram(s) HFA Inhaler 2Puff(s) Inhalation every 6 hours  ammonium lactate 12% Lotion 1Application(s) Topical two times a day  fluticasone propionate 50 MICROgram(s)/spray Nasal Spray 1Spray(s) Both Nostrils two times a day  metoprolol 12.5milliGRAM(s) Oral two times a day  cefepime  IVPB 1000milliGRAM(s) IV Intermittent every 24 hours  hydrALAZINE 150milliGRAM(s) Oral two times a day  lactobacillus acidophilus 1Tablet(s) Oral daily  gabapentin 600milliGRAM(s) Oral at bedtime  pantoprazole  Injectable 40milliGRAM(s) IV Push every 12 hours    MEDICATIONS  (PRN):  traMADol 100milliGRAM(s) Oral every 6 hours PRN Moderate Pain (4 - 6)  aluminum hydroxide/magnesium hydroxide/simethicone Suspension 30milliLiter(s) Oral every 6 hours PRN Dyspepsia  oxyCODONE  5 mG/acetaminophen 325 mG 1Tablet(s) Oral every 4 hours PRN Severe Pain (7 - 10)      REVIEW OF SYSTEMS:    RESPIRATORY: No shortness of breath  CARDIOVASCULAR: No chest pain  All other review of systems is negative unless indicated above.    Vital Signs Last 24 Hrs  T(C): 36.7, Max: 37.1 (05-13 @ 22:43)  T(F): 98, Max: 98.8 (05-13 @ 22:43)  HR: 84 (77 - 111)  BP: 154/43 (154/43 - 154/43)  BP(mean): 71 (71 - 71)  RR: 15 (14 - 22)  SpO2: 90% (90% - 100%)    PHYSICAL EXAM:    Constitutional: NAD, well-developed  Respiratory: CTAB  Cardiovascular: S1 and S2, RRR  Gastrointestinal: BS+, soft, NT/ND  Psychiatric: Normal mood, normal affect    LABS:                        10.4   8.1   )-----------( 246      ( 13 May 2017 05:14 )             33.5     05-13    149<H>  |  120<H>  |  96<H>  ----------------------------<  110<H>  5.1   |  19<L>  |  1.97<H>    Ca    8.1<L>      13 May 2017 05:14            RADIOLOGY & ADDITIONAL STUDIES:

## 2017-05-14 NOTE — PROGRESS NOTE ADULT - ASSESSMENT
# ARYA due to pre-renal azothemia with CKD stage 4 at baseline ( 2.0-2.5)  # Anemia due to acute blood loss anemia  # Sepsis? due to LE ulcers  # Multiple DVT on RUE/RLE with s/p IVC fileter  # HTN    PLAN  - moniter off diuretics ( only inbetween blood transfusion) and fu response to the PRBC transfusion  - continue with current anti HTN  - strick I/O   - abx as per ID, pt given one dose of vanc in ER  family updated at bedside  - ANemia: fu the trend of the hgb s/p 2 units of prbc transfusion  - Cellulitis: on abx  - PVD: await further interventions by dr dugan early next week.      5/13  feels well   daughter at bedside  Na elevated chronically, component of DI?  encourage po free water  creat improving    5/14  no new [id number]   creat stable post procedure  repeat labs today  further vascular intervention this week

## 2017-05-14 NOTE — PROGRESS NOTE ADULT - SUBJECTIVE AND OBJECTIVE BOX
R foot pink and warm, better perfused appearing    MEDICATIONS  (STANDING):  buDESOnide   0.5 milliGRAM(s) Respule 0.5milliGRAM(s) Inhalation two times a day  doxazosin 8milliGRAM(s) Oral at bedtime  isosorbide   mononitrate ER Tablet (IMDUR) 120milliGRAM(s) Oral daily  diltiazem   CD 120milliGRAM(s) Oral daily  gabapentin 300milliGRAM(s) Oral daily  allopurinol 100milliGRAM(s) Oral daily  fenofibrate Tablet 145milliGRAM(s) Oral daily  simvastatin 10milliGRAM(s) Oral at bedtime  pramipexole 0.125milliGRAM(s) Oral three times a day  ALBUTerol    90 MICROgram(s) HFA Inhaler 2Puff(s) Inhalation every 6 hours  ammonium lactate 12% Lotion 1Application(s) Topical two times a day  fluticasone propionate 50 MICROgram(s)/spray Nasal Spray 1Spray(s) Both Nostrils two times a day  metoprolol 12.5milliGRAM(s) Oral two times a day  cefepime  IVPB 1000milliGRAM(s) IV Intermittent every 24 hours  hydrALAZINE 150milliGRAM(s) Oral two times a day  lactobacillus acidophilus 1Tablet(s) Oral daily  gabapentin 600milliGRAM(s) Oral at bedtime  pantoprazole  Injectable 40milliGRAM(s) IV Push every 12 hours    MEDICATIONS  (PRN):  traMADol 100milliGRAM(s) Oral every 6 hours PRN Moderate Pain (4 - 6)  aluminum hydroxide/magnesium hydroxide/simethicone Suspension 30milliLiter(s) Oral every 6 hours PRN Dyspepsia  oxyCODONE  5 mG/acetaminophen 325 mG 1Tablet(s) Oral every 4 hours PRN Severe Pain (7 - 10)      Allergies    No Known Allergies    Intolerances        Flatus: [ ] YES [ ] NO             Bowel Movement: [ ] YES [ ] NO  Pain (0-10):            Pain Control Adequate: [ ] YES [ ] NO  Nausea: [ ] YES [ ] NO            Vomiting: [ ] YES [ ] NO  Diarrhea: [ ] YES [ ] NO         Constipation: [ ] YES [ ] NO     Chest Pain: [ ] YES [ ] NO    SOB:  [ ] YES [ ] NO    Vital Signs Last 24 Hrs  T(C): 36.7, Max: 37.1 (05-13 @ 22:43)  T(F): 98, Max: 98.8 (05-13 @ 22:43)  HR: 84 (77 - 111)  BP: 154/43 (154/43 - 154/43)  BP(mean): 71 (71 - 71)  RR: 15 (14 - 22)  SpO2: 90% (90% - 100%)    I&O's Summary    I & Os for current day (as of 14 May 2017 08:12)  =============================================  IN: 0 ml / OUT: 1750 ml / NET: -1750 ml      Physical Exam:  General: NAD, resting comfortably  Pulmonary: normal resp effort, CTA-B  Cardiovascular: NSR  Abdominal: soft, NT/ND  Extremities: WWP, normal strength  Neuro: A/O x 3, CNs II-XII grossly intact, normal motor/sensation, no focal deficits  Pulses:   Right:                                                                          Left:  FEM [ ]2+ [ ]1+ [ ]doppler                                             FEM [ ]2+ [ ]1+ [ ]doppler    POP [ ]2+ [ ]1+ [ ]doppler                                             POP [ ]2+ [ ]1+ [ ]doppler    DP [ ]2+ [ ]1+ [ ]doppler                                                DP [ ]2+ [ ]1+ [ ]doppler  PT[ ]2+ [ ]1+ [ ]doppler                                                  PT [ ]2+ [ ]1+ [ ]doppler    LABS:                        10.4   8.1   )-----------( 246      ( 13 May 2017 05:14 )             33.5     05-13    149<H>  |  120<H>  |  96<H>  ----------------------------<  110<H>  5.1   |  19<L>  |  1.97<H>    Ca    8.1<L>      13 May 2017 05:14            CAPILLARY BLOOD GLUCOSE      RADIOLOGY & ADDITIONAL TESTS:

## 2017-05-14 NOTE — PROGRESS NOTE ADULT - SUBJECTIVE AND OBJECTIVE BOX
82 year old male with history of CAD s/p stents (LAD, RCA bare metal around 9/16), A.Fib not on anticoagulation due to GI bleeding, Hypertension, COPD on home O2 2L, SHAYY on nocturnal BIPAP, ex-smoker (smoked 1ppd X 50 years, quit 22 years ago),  Chronic diastolic CHF, Hyperlipidemia, PVD, Iron deficiency anemia, Chronic back pain, Gout, BPH, CKD III with baseline Cr 2, recently admitted to  in 4/17 with anemia of GI bleeding, RLE and RUE DVTs, received pRBCs and IVC filter discharged to rehab with aspirin was sent to  ER on 5/4/17 for worsening anemia with Hb 6, worsening leg pain from ulcers and worsening renal function Cr 3.99.    Pt seen bed side.  Not well oriented currently after receiving Dilaudid for pain in ER.  As per daughters, pt was walking with a walker at rehab and was doing better.  Pt was found to have tarry black stools in ER, guaic positive. (04 May 2017 15:56)    TD  05/12:  IS.  awake.  pleasant and comfortable.  5/13/17 no new events  5/14/17 no acute events    MEDICATIONS  (STANDING):  buDESOnide   0.5 milliGRAM(s) Respule 0.5milliGRAM(s) Inhalation two times a day  doxazosin 8milliGRAM(s) Oral at bedtime  isosorbide   mononitrate ER Tablet (IMDUR) 120milliGRAM(s) Oral daily  diltiazem   CD 120milliGRAM(s) Oral daily  gabapentin 300milliGRAM(s) Oral daily  allopurinol 100milliGRAM(s) Oral daily  fenofibrate Tablet 145milliGRAM(s) Oral daily  simvastatin 10milliGRAM(s) Oral at bedtime  pramipexole 0.125milliGRAM(s) Oral three times a day  ALBUTerol    90 MICROgram(s) HFA Inhaler 2Puff(s) Inhalation every 6 hours  ammonium lactate 12% Lotion 1Application(s) Topical two times a day  fluticasone propionate 50 MICROgram(s)/spray Nasal Spray 1Spray(s) Both Nostrils two times a day  metoprolol 12.5milliGRAM(s) Oral two times a day  cefepime  IVPB 1000milliGRAM(s) IV Intermittent every 24 hours  hydrALAZINE 150milliGRAM(s) Oral two times a day  lactobacillus acidophilus 1Tablet(s) Oral daily  gabapentin 600milliGRAM(s) Oral at bedtime  pantoprazole  Injectable 40milliGRAM(s) IV Push every 12 hours    MEDICATIONS  (PRN):  traMADol 100milliGRAM(s) Oral every 6 hours PRN Moderate Pain (4 - 6)  aluminum hydroxide/magnesium hydroxide/simethicone Suspension 30milliLiter(s) Oral every 6 hours PRN Dyspepsia  oxyCODONE  5 mG/acetaminophen 325 mG 1Tablet(s) Oral every 4 hours PRN Severe Pain (7 - 10)    Vital Signs Last 24 Hrs  T(C): 37, Max: 37.1 (05-13 @ 22:43)  T(F): 98.6, Max: 98.8 (05-13 @ 22:43)  HR: 83 (77 - 111)  BP: 152/46 (127/43 - 164/52)  BP(mean): 70 (64 - 80)  RR: 18 (14 - 22)  SpO2: 96% (90% - 100%)    Physical exam:  Appears chronically ill  Lungs- decrease air entry at bases  S1S2  Abd- soft, NT, BS+  Ext- dressing both legs, RLE- black escar on the top of the foot  Neuro- AAOx3    LABS:                                            10.4   8.1   )-----------( 246      ( 13 May 2017 05:14 )             33.5     13 May 2017 05:14    149    |  120    |  96     ----------------------------<  110    5.1     |  19     |  1.97     Ca    8.1        13 May 2017 05:14    Assessment/Plan:  #severe PAD.  05/11 endarterectomy of CFA plaque in an attempt of limb preservation.  05/08 angiogram + angioplasy.  stenting of right common iliac + external iliac arteries.  statin.   Further plan as per DR Clement    #chronic right LE stasis ulcers, dermatitis w/ cellulitis.  low-grade temps.  c/w cefepime.  ID following.    #GIB/acute blood loss anemia.  s/p 5 units PRBCs.  +/- EGD if respiratory status stabilizes.  Cont PPI  GI f/u appreciated    #pulmonary vascular congestion.  Lasix IV x 1.    #ARYA upon CKD.  Cr close to baseline.  Nephrology following.    #multiple DVT RUE/RLL.  s/p IVCF.    #Dispo- observe over the weekend. Further plan as per DR Clement

## 2017-05-14 NOTE — PROGRESS NOTE ADULT - SUBJECTIVE AND OBJECTIVE BOX
NEPHROLOGY INTERVAL HPI/OVERNIGHT EVENTS:    HPI:  82 year old male with history of CAD s/p stents (LAD, RCA bare metal around 9/16), A.Fib not on anticoagulation due to GI bleeding, Hypertension, COPD on home O2 2L, SHAYY on nocturnal BIPAP, ex-smoker (smoked 1ppd X 50 years, quit 22 years ago),  Chronic diastolic CHF, Hyperlipidemia, PVD, Iron deficiency anemia, Chronic back pain, Gout, BPH, CKD III with baseline Cr 2, recently admitted to  in 4/17 with anemia of GI bleeding, RLE and RUE DVTs, received pRBCs and IVC filter discharged to rehab with aspirin was sent to  ER on 5/4/17 for worsening anemia with Hb 6, worsening leg pain from ulcers and worsening renal function Cr 3.99.    Pt seen bed side.  Not well oriented currently after receiving Dilaudid for pain in ER.  As per daughters, pt was walking with a walker at rehab and was doing better.  Pt was found to have tarry black stools in ER, guaic positive. (04 May 2017 15:56)    5/13  feels well   no new complaints good appetite    5/14  awake alert in good spirits  no new events      PAST MEDICAL & SURGICAL HISTORY:  Sepsis, due to unspecified organism: 2/2 poorly healing wounds b/l  BPH (benign prostatic hypertrophy)  Hyperlipemia  Coronary artery disease  Hypertension  Dyspepsia: On moderate exertion.  Sleep apnea, obstructive: Requires home 02 therapy, and treatment with BIPAP  Atelectasis  Pleural effusion, bilateral  Respiratory failure  Peripheral edema  CRI (chronic renal insufficiency)  Gout  CRF (chronic renal failure)  Benign prostatic hypertrophy  Spinal stenosis  Hypercholesterolemia  GERD (gastroesophageal reflux disease)  CAD (coronary artery disease)  Hypertension  S/P angioplasty with stent  Cataract of left eye  Prostate: Surgery green light procedure.  S/P rotator cuff surgery: Right  S/P angioplasty  Rotator cuff tear, right: repair      FAMILY HISTORY:  No pertinent family history in first degree relatives      MEDICATIONS  (STANDING):  buDESOnide   0.5 milliGRAM(s) Respule 0.5milliGRAM(s) Inhalation two times a day  doxazosin 8milliGRAM(s) Oral at bedtime  isosorbide   mononitrate ER Tablet (IMDUR) 120milliGRAM(s) Oral daily  diltiazem   CD 120milliGRAM(s) Oral daily  gabapentin 300milliGRAM(s) Oral daily  allopurinol 100milliGRAM(s) Oral daily  fenofibrate Tablet 145milliGRAM(s) Oral daily  simvastatin 10milliGRAM(s) Oral at bedtime  pramipexole 0.125milliGRAM(s) Oral three times a day  ALBUTerol    90 MICROgram(s) HFA Inhaler 2Puff(s) Inhalation every 6 hours  ammonium lactate 12% Lotion 1Application(s) Topical two times a day  fluticasone propionate 50 MICROgram(s)/spray Nasal Spray 1Spray(s) Both Nostrils two times a day  metoprolol 12.5milliGRAM(s) Oral two times a day  cefepime  IVPB 1000milliGRAM(s) IV Intermittent every 24 hours  hydrALAZINE 150milliGRAM(s) Oral two times a day  lactobacillus acidophilus 1Tablet(s) Oral daily  gabapentin 600milliGRAM(s) Oral at bedtime  pantoprazole  Injectable 40milliGRAM(s) IV Push every 12 hours    MEDICATIONS  (PRN):  traMADol 100milliGRAM(s) Oral every 6 hours PRN Moderate Pain (4 - 6)  aluminum hydroxide/magnesium hydroxide/simethicone Suspension 30milliLiter(s) Oral every 6 hours PRN Dyspepsia  oxyCODONE  5 mG/acetaminophen 325 mG 1Tablet(s) Oral every 4 hours PRN Severe Pain (7 - 10)      Allergies    No Known Allergies    Intolerances        I&O's Summary    I & Os for current day (as of 13 May 2017 15:08)  =============================================  IN: 1134 ml / OUT: 1575 ml / NET: -441 ml        REVIEW OF SYSTEMS:    CONSTITUTIONAL:  As per HPI.    HEENT:  Eyes:  No diplopia or blurred vision. ENT:  No earache, sore throat or runny nose.    CARDIOVASCULAR:  No pressure, squeezing, strangling, tightness, heaviness or aching about the chest, neck, axilla or epigastrium.    RESPIRATORY:  No cough, shortness of breath, PND or orthopnea.    GASTROINTESTINAL:  No nausea, vomiting or diarrhea.    GENITOURINARY:  No dysuria, frequency or urgency.    MUSCULOSKELETAL:  As per HPI.    SKIN:  No change in skin, hair or nails.    NEUROLOGIC:  No paresthesias, fasciculations, seizures or weakness.    PSYCHIATRIC:  No disorder of thought or mood.    ENDOCRINE:  No heat or cold intolerance, polyuria or polydipsia.    HEMATOLOGICAL:  No easy bruising or bleeding.      Vital Signs Last 24 Hrs  T(C): 37.1, Max: 37.4 (05-13 @ 04:36)  T(F): 98.7, Max: 99.3 (05-13 @ 04:36)  HR: 94 (82 - 96)  BP: --  BP(mean): --  RR: 20 (14 - 23)  SpO2: 97% (85% - 98%)  Daily     Daily     PHYSICAL EXAM:    General:  Alert, well-developed ,No acute distress.    Neuro:  Alert and oriented to person, place, and time. Able to communicate  well. Cranial nerves 2-12 grossly intact. 5/5 strength in all  extremities bilaterally. Sensation intact in all extremities.  Appropriate affect.     HEENT:  No JVD, no masses, Eyes anicteric, No carotid bruits.No lymphadenopathy,    Cardiovascular:  Regular rate and rhythm, with normal S1 and S2. No murmurs, rubs,  or gallops. No JVD.    Lungs:  Lungs clear. no rales, no wheezing, .    Abdomen:  Normoactive bowel sounds. Soft, flat, non-tender, and non-distended.  No hepatosplenomegaly, positive bowel sounds    Skin:  Warm, dry, well-perfused. No rashes or other lesions.     Extremities:  legs wrapped.    LABS:                        10.4   8.1   )-----------( 246      ( 13 May 2017 05:14 )             33.5     05-13    149<H>  |  120<H>  |  96<H>  ----------------------------<  110<H>  5.1   |  19<L>  |  1.97<H>    Ca    8.1<L>      13 May 2017 05:14

## 2017-05-15 LAB
ANION GAP SERPL CALC-SCNC: 9 MMOL/L — SIGNIFICANT CHANGE UP (ref 5–17)
BUN SERPL-MCNC: 80 MG/DL — HIGH (ref 7–23)
CALCIUM SERPL-MCNC: 7.9 MG/DL — LOW (ref 8.5–10.1)
CHLORIDE SERPL-SCNC: 120 MMOL/L — HIGH (ref 96–108)
CO2 SERPL-SCNC: 19 MMOL/L — LOW (ref 22–31)
CREAT SERPL-MCNC: 1.49 MG/DL — HIGH (ref 0.5–1.3)
GLUCOSE SERPL-MCNC: 97 MG/DL — SIGNIFICANT CHANGE UP (ref 70–99)
HCT VFR BLD CALC: 33.5 % — LOW (ref 39–50)
HGB BLD-MCNC: 10.2 G/DL — LOW (ref 13–17)
MCHC RBC-ENTMCNC: 28.9 PG — SIGNIFICANT CHANGE UP (ref 27–34)
MCHC RBC-ENTMCNC: 30.6 GM/DL — LOW (ref 32–36)
MCV RBC AUTO: 94.4 FL — SIGNIFICANT CHANGE UP (ref 80–100)
PLATELET # BLD AUTO: 302 K/UL — SIGNIFICANT CHANGE UP (ref 150–400)
POTASSIUM SERPL-MCNC: 4.7 MMOL/L — SIGNIFICANT CHANGE UP (ref 3.5–5.3)
POTASSIUM SERPL-SCNC: 4.7 MMOL/L — SIGNIFICANT CHANGE UP (ref 3.5–5.3)
RBC # BLD: 3.55 M/UL — LOW (ref 4.2–5.8)
RBC # FLD: 15.3 % — HIGH (ref 10.3–14.5)
SODIUM SERPL-SCNC: 148 MMOL/L — HIGH (ref 135–145)
WBC # BLD: 6.4 K/UL — SIGNIFICANT CHANGE UP (ref 3.8–10.5)
WBC # FLD AUTO: 6.4 K/UL — SIGNIFICANT CHANGE UP (ref 3.8–10.5)

## 2017-05-15 RX ORDER — HYDROMORPHONE HYDROCHLORIDE 2 MG/ML
0.5 INJECTION INTRAMUSCULAR; INTRAVENOUS; SUBCUTANEOUS ONCE
Qty: 0 | Refills: 0 | Status: DISCONTINUED | OUTPATIENT
Start: 2017-05-15 | End: 2017-05-15

## 2017-05-15 RX ORDER — FUROSEMIDE 40 MG
40 TABLET ORAL DAILY
Qty: 0 | Refills: 0 | Status: DISCONTINUED | OUTPATIENT
Start: 2017-05-15 | End: 2017-05-17

## 2017-05-15 RX ADMIN — Medication 120 MILLIGRAM(S): at 05:39

## 2017-05-15 RX ADMIN — Medication 1 APPLICATION(S): at 17:57

## 2017-05-15 RX ADMIN — Medication 12.5 MILLIGRAM(S): at 18:06

## 2017-05-15 RX ADMIN — Medication 8 MILLIGRAM(S): at 22:00

## 2017-05-15 RX ADMIN — Medication 40 MILLIGRAM(S): at 14:23

## 2017-05-15 RX ADMIN — PRAMIPEXOLE DIHYDROCHLORIDE 0.12 MILLIGRAM(S): 0.12 TABLET ORAL at 14:17

## 2017-05-15 RX ADMIN — ALBUTEROL 2 PUFF(S): 90 AEROSOL, METERED ORAL at 14:26

## 2017-05-15 RX ADMIN — CEFEPIME 100 MILLIGRAM(S): 1 INJECTION, POWDER, FOR SOLUTION INTRAMUSCULAR; INTRAVENOUS at 18:02

## 2017-05-15 RX ADMIN — Medication 1 TABLET(S): at 12:06

## 2017-05-15 RX ADMIN — GABAPENTIN 600 MILLIGRAM(S): 400 CAPSULE ORAL at 22:00

## 2017-05-15 RX ADMIN — Medication 1 SPRAY(S): at 05:41

## 2017-05-15 RX ADMIN — Medication 150 MILLIGRAM(S): at 18:02

## 2017-05-15 RX ADMIN — Medication 145 MILLIGRAM(S): at 12:06

## 2017-05-15 RX ADMIN — Medication 0.5 MILLIGRAM(S): at 19:36

## 2017-05-15 RX ADMIN — Medication 100 MILLIGRAM(S): at 12:06

## 2017-05-15 RX ADMIN — PRAMIPEXOLE DIHYDROCHLORIDE 0.12 MILLIGRAM(S): 0.12 TABLET ORAL at 05:40

## 2017-05-15 RX ADMIN — GABAPENTIN 300 MILLIGRAM(S): 400 CAPSULE ORAL at 12:06

## 2017-05-15 RX ADMIN — Medication 0.5 MILLIGRAM(S): at 07:40

## 2017-05-15 RX ADMIN — Medication 1 SPRAY(S): at 18:03

## 2017-05-15 RX ADMIN — PANTOPRAZOLE SODIUM 40 MILLIGRAM(S): 20 TABLET, DELAYED RELEASE ORAL at 05:38

## 2017-05-15 RX ADMIN — PANTOPRAZOLE SODIUM 40 MILLIGRAM(S): 20 TABLET, DELAYED RELEASE ORAL at 18:02

## 2017-05-15 RX ADMIN — ISOSORBIDE MONONITRATE 120 MILLIGRAM(S): 60 TABLET, EXTENDED RELEASE ORAL at 12:06

## 2017-05-15 RX ADMIN — Medication 150 MILLIGRAM(S): at 05:39

## 2017-05-15 RX ADMIN — HYDROMORPHONE HYDROCHLORIDE 0.5 MILLIGRAM(S): 2 INJECTION INTRAMUSCULAR; INTRAVENOUS; SUBCUTANEOUS at 23:20

## 2017-05-15 RX ADMIN — ALBUTEROL 2 PUFF(S): 90 AEROSOL, METERED ORAL at 19:35

## 2017-05-15 RX ADMIN — SIMVASTATIN 10 MILLIGRAM(S): 20 TABLET, FILM COATED ORAL at 22:00

## 2017-05-15 RX ADMIN — ALBUTEROL 2 PUFF(S): 90 AEROSOL, METERED ORAL at 07:43

## 2017-05-15 RX ADMIN — PRAMIPEXOLE DIHYDROCHLORIDE 0.12 MILLIGRAM(S): 0.12 TABLET ORAL at 22:00

## 2017-05-15 RX ADMIN — Medication 12.5 MILLIGRAM(S): at 05:39

## 2017-05-15 NOTE — PROGRESS NOTE ADULT - SUBJECTIVE AND OBJECTIVE BOX
Patient is a 82y old  Male who presents with a chief complaint of Worsening leg pain, anemia, ARYA sent from Rehab (04 May 2017 15:56)      HPI:  82 year old male with history of CAD s/p stents (LAD, RCA bare metal around 9/16), A.Fib not on anticoagulation due to GI bleeding, Hypertension, COPD on home O2 2L, SHAYY on nocturnal BIPAP, ex-smoker (smoked 1ppd X 50 years, quit 22 years ago),  Chronic diastolic CHF, Hyperlipidemia, PVD, Iron deficiency anemia, Chronic back pain, Gout, BPH, CKD III with baseline Cr 2, recently admitted to  in 4/17 with anemia of GI bleeding, RLE and RUE DVTs, received pRBCs and IVC filter discharged to rehab with aspirin was sent to  ER on 5/4/17 for worsening anemia with Hb 6, worsening leg pain from ulcers and worsening renal function Cr 3.99.    Pt seen bed side.  Not well oriented currently after receiving Dilaudid for pain in ER.  As per daughters, pt was walking with a walker at rehab and was doing better.  Pt was found to have tarry black stools in ER, guaic positive. (04 May 2017 15:56)    pt comf  mild upper abd discomfort and has LE weakness        PAST MEDICAL & SURGICAL HISTORY:  Sepsis, due to unspecified organism: 2/2 poorly healing wounds b/l  BPH (benign prostatic hypertrophy)  Hyperlipemia  Coronary artery disease  Hypertension  Dyspepsia: On moderate exertion.  Sleep apnea, obstructive: Requires home 02 therapy, and treatment with BIPAP  Atelectasis  Pleural effusion, bilateral  Respiratory failure  Peripheral edema  CRI (chronic renal insufficiency)  Gout  CRF (chronic renal failure)  Benign prostatic hypertrophy  Spinal stenosis  Hypercholesterolemia  GERD (gastroesophageal reflux disease)  CAD (coronary artery disease)  Hypertension  S/P angioplasty with stent  Cataract of left eye  Prostate: Surgery green light procedure.  S/P rotator cuff surgery: Right  S/P angioplasty  Rotator cuff tear, right: repair      MEDICATIONS  (STANDING):  buDESOnide   0.5 milliGRAM(s) Respule 0.5milliGRAM(s) Inhalation two times a day  doxazosin 8milliGRAM(s) Oral at bedtime  isosorbide   mononitrate ER Tablet (IMDUR) 120milliGRAM(s) Oral daily  diltiazem   CD 120milliGRAM(s) Oral daily  gabapentin 300milliGRAM(s) Oral daily  allopurinol 100milliGRAM(s) Oral daily  fenofibrate Tablet 145milliGRAM(s) Oral daily  simvastatin 10milliGRAM(s) Oral at bedtime  pramipexole 0.125milliGRAM(s) Oral three times a day  ALBUTerol    90 MICROgram(s) HFA Inhaler 2Puff(s) Inhalation every 6 hours  ammonium lactate 12% Lotion 1Application(s) Topical two times a day  fluticasone propionate 50 MICROgram(s)/spray Nasal Spray 1Spray(s) Both Nostrils two times a day  metoprolol 12.5milliGRAM(s) Oral two times a day  cefepime  IVPB 1000milliGRAM(s) IV Intermittent every 24 hours  hydrALAZINE 150milliGRAM(s) Oral two times a day  lactobacillus acidophilus 1Tablet(s) Oral daily  gabapentin 600milliGRAM(s) Oral at bedtime  pantoprazole  Injectable 40milliGRAM(s) IV Push every 12 hours    MEDICATIONS  (PRN):  traMADol 100milliGRAM(s) Oral every 6 hours PRN Moderate Pain (4 - 6)  aluminum hydroxide/magnesium hydroxide/simethicone Suspension 30milliLiter(s) Oral every 6 hours PRN Dyspepsia  oxyCODONE  5 mG/acetaminophen 325 mG 1Tablet(s) Oral every 4 hours PRN Severe Pain (7 - 10)      Allergies    No Known Allergies    Intolerances        SOCIAL HISTORY:unchanged    FAMILY HISTORY:  No pertinent family history in first degree relatives      REVIEW OF SYSTEMS:    CONSTITUTIONAL: No weakness, fevers or chills  EYES/ENT: No visual changes;  No vertigo or throat pain   NECK: No pain or stiffness  RESPIRATORY: No cough, wheezing, hemoptysis; No shortness of breath  CARDIOVASCULAR: No chest pain or palpitations  GENITOURINARY: No dysuria, frequency or hematuria  NEUROLOGICAL: No numbness or weakness  SKIN: No itching, burning, rashes, or lesions   All other review of systems is negative unless indicated above.    Vital Signs Last 24 Hrs  T(C): 36.2, Max: 37.4 (05-14 @ 16:22)  T(F): 97.2, Max: 99.4 (05-14 @ 16:22)  HR: 95 (78 - 105)  BP: 159/51 (127/43 - 168/53)  BP(mean): 80 (61 - 81)  RR: 17 (15 - 22)  SpO2: 97% (89% - 100%)    PHYSICAL EXAM:    Constitutional: NAD, well-developed  HEENT: EOMI, throat clear  Neck: No LAD, supple  Respiratory: CTA and P  Cardiovascular: S1 and S2, RRR, no M  Gastrointestinal: BS+, soft, NT/ND, neg HSM,  Extremities: po peripheral edema, neg clubing, cyanosis, RLE ulcers and has dressing on LE    Neurological: A/O x 3, no focal deficits  Psychiatric: Normal mood, normal affect  Skin: No rashes    LABS:  CBC Full  -  ( 15 May 2017 05:19 )  WBC Count : 6.4 K/uL  Hemoglobin : 10.2 g/dL  Hematocrit : 33.5 %  Platelet Count - Automated : 302 K/uL  Mean Cell Volume : 94.4 fl  Mean Cell Hemoglobin : 28.9 pg  Mean Cell Hemoglobin Concentration : 30.6 gm/dL  Auto Neutrophil # : x  Auto Lymphocyte # : x  Auto Monocyte # : x  Auto Eosinophil # : x  Auto Basophil # : x  Auto Neutrophil % : x  Auto Lymphocyte % : x  Auto Monocyte % : x  Auto Eosinophil % : x  Auto Basophil % : x    05-15    148<H>  |  120<H>  |  80<H>  ----------------------------<  97  4.7   |  19<L>  |  1.49<H>    Ca    7.9<L>      15 May 2017 05:19              RADIOLOGY & ADDITIONAL STUDIES:EXAM:  CHEST SINGLE VIEW FRONTAL                            PROCEDURE DATE:  05/14/2017        INTERPRETATION:  EXAMINATION DATE: May 14, 2017 at 0902 hours.  CLINICAL INFORMATION: CHF.    TECHNIQUE: Frontal view of the chest   COMPARISON: May 12, 2017.    FINDINGS:    LUNGS/PLEURA: Small left pleural effusion with basilar atelectasis,   unchanged. No pneumothorax.  MEDIASTINUM: Cardiac silhouette is enlarged.  OTHER: None.    IMPRESSION:     Since May 12, 2017, unchanged small left pleural effusion with basilar   atelectasis.              TRUDY KLEIN   This document has been electronically signed. May 14 2017 10:03AM

## 2017-05-15 NOTE — PROGRESS NOTE ADULT - SUBJECTIVE AND OBJECTIVE BOX
feeling better    Hct stable, Cr 1.49  MEDICATIONS  (STANDING):  buDESOnide   0.5 milliGRAM(s) Respule 0.5milliGRAM(s) Inhalation two times a day  doxazosin 8milliGRAM(s) Oral at bedtime  isosorbide   mononitrate ER Tablet (IMDUR) 120milliGRAM(s) Oral daily  diltiazem   CD 120milliGRAM(s) Oral daily  gabapentin 300milliGRAM(s) Oral daily  allopurinol 100milliGRAM(s) Oral daily  fenofibrate Tablet 145milliGRAM(s) Oral daily  simvastatin 10milliGRAM(s) Oral at bedtime  pramipexole 0.125milliGRAM(s) Oral three times a day  ALBUTerol    90 MICROgram(s) HFA Inhaler 2Puff(s) Inhalation every 6 hours  ammonium lactate 12% Lotion 1Application(s) Topical two times a day  fluticasone propionate 50 MICROgram(s)/spray Nasal Spray 1Spray(s) Both Nostrils two times a day  metoprolol 12.5milliGRAM(s) Oral two times a day  cefepime  IVPB 1000milliGRAM(s) IV Intermittent every 24 hours  hydrALAZINE 150milliGRAM(s) Oral two times a day  lactobacillus acidophilus 1Tablet(s) Oral daily  gabapentin 600milliGRAM(s) Oral at bedtime  pantoprazole  Injectable 40milliGRAM(s) IV Push every 12 hours    MEDICATIONS  (PRN):  traMADol 100milliGRAM(s) Oral every 6 hours PRN Moderate Pain (4 - 6)  aluminum hydroxide/magnesium hydroxide/simethicone Suspension 30milliLiter(s) Oral every 6 hours PRN Dyspepsia  oxyCODONE  5 mG/acetaminophen 325 mG 1Tablet(s) Oral every 4 hours PRN Severe Pain (7 - 10)      Allergies    No Known Allergies    Intolerances        Flatus: [ ] YES [ ] NO             Bowel Movement: [ ] YES [ ] NO  Pain (0-10):            Pain Control Adequate: [ ] YES [ ] NO  Nausea: [ ] YES [ ] NO            Vomiting: [ ] YES [ ] NO  Diarrhea: [ ] YES [ ] NO         Constipation: [ ] YES [ ] NO     Chest Pain: [ ] YES [ ] NO    SOB:  [ ] YES [ ] NO    Vital Signs Last 24 Hrs  T(C): 36.2, Max: 37.4 (05-14 @ 16:22)  T(F): 97.2, Max: 99.4 (05-14 @ 16:22)  HR: 95 (78 - 105)  BP: 159/51 (127/43 - 168/53)  BP(mean): 80 (61 - 81)  RR: 17 (15 - 22)  SpO2: 97% (89% - 100%)    I&O's Summary    I & Os for current day (as of 15 May 2017 09:13)  =============================================  IN: 1010 ml / OUT: 1375 ml / NET: -365 ml      Physical Exam:  General: NAD, resting comfortably  R groin incision intact without drainage but skin slightly macerated from lying within an intertriginous fold; no cellulitis    R foot pink, warm and well perfused with dry eschar on dorsal surface; plantar surface intact; sensation to light touch present on foot but motor still compromised              LABS:                        10.2   6.4   )-----------( 302      ( 15 May 2017 05:19 )             33.5     05-15    148<H>  |  120<H>  |  80<H>  ----------------------------<  97  4.7   |  19<L>  |  1.49<H>    Ca    7.9<L>      15 May 2017 05:19            CAPILLARY BLOOD GLUCOSE      RADIOLOGY & ADDITIONAL TESTS:

## 2017-05-15 NOTE — PROGRESS NOTE ADULT - SUBJECTIVE AND OBJECTIVE BOX
NEPHROLOGY INTERVAL HPI/OVERNIGHT EVENTS:  no new complaints, dw dr dugan..for now will need to plan for observation and no immediate angio     MEDICATIONS  (STANDING):  buDESOnide   0.5 milliGRAM(s) Respule 0.5milliGRAM(s) Inhalation two times a day  doxazosin 8milliGRAM(s) Oral at bedtime  isosorbide   mononitrate ER Tablet (IMDUR) 120milliGRAM(s) Oral daily  diltiazem   CD 120milliGRAM(s) Oral daily  gabapentin 300milliGRAM(s) Oral daily  allopurinol 100milliGRAM(s) Oral daily  fenofibrate Tablet 145milliGRAM(s) Oral daily  simvastatin 10milliGRAM(s) Oral at bedtime  pramipexole 0.125milliGRAM(s) Oral three times a day  ALBUTerol    90 MICROgram(s) HFA Inhaler 2Puff(s) Inhalation every 6 hours  ammonium lactate 12% Lotion 1Application(s) Topical two times a day  fluticasone propionate 50 MICROgram(s)/spray Nasal Spray 1Spray(s) Both Nostrils two times a day  metoprolol 12.5milliGRAM(s) Oral two times a day  cefepime  IVPB 1000milliGRAM(s) IV Intermittent every 24 hours  hydrALAZINE 150milliGRAM(s) Oral two times a day  lactobacillus acidophilus 1Tablet(s) Oral daily  gabapentin 600milliGRAM(s) Oral at bedtime  pantoprazole  Injectable 40milliGRAM(s) IV Push every 12 hours    MEDICATIONS  (PRN):  traMADol 100milliGRAM(s) Oral every 6 hours PRN Moderate Pain (4 - 6)  aluminum hydroxide/magnesium hydroxide/simethicone Suspension 30milliLiter(s) Oral every 6 hours PRN Dyspepsia  oxyCODONE  5 mG/acetaminophen 325 mG 1Tablet(s) Oral every 4 hours PRN Severe Pain (7 - 10)      Allergies    No Known Allergies    Intolerances        I&O's Detail    I & Os for current day (as of 15 May 2017 12:53)  =============================================  IN:    Oral Fluid: 960 ml    IV PiggyBack: 50 ml    Total IN: 1010 ml  ---------------------------------------------  OUT:    Ureteral Catheter: 1375 ml    Total OUT: 1375 ml  ---------------------------------------------  Total NET: -365 ml    Vital Signs Last 24 Hrs  T(C): 36.2, Max: 37.4 ( @ 16:22)  T(F): 97.2, Max: 99.4 ( @ 16:22)  HR: 95 (78 - 105)  BP: 159/51 (139/35 - 168/53)  BP(mean): 80 (61 - 81)  RR: 17 (15 - 21)  SpO2: 97% (89% - 100%)  Daily     Daily Weight in k (15 May 2017 06:00)    PHYSICAL EXAM:  General: alert. awake Ox3  HEENT: MMM  CV: s1s2 rrr  LUNGS: B/L CTA  EXT: + edema    LABS:                        10.2   6.4   )-----------( 302      ( 15 May 2017 05:19 )             33.5     05-15    148<H>  |  120<H>  |  80<H>  ----------------------------<  97  4.7   |  19<L>  |  1.49<H>    Ca    7.9<L>      15 May 2017 05:19

## 2017-05-15 NOTE — PROGRESS NOTE ADULT - ASSESSMENT
80 y/o male with h/o COPD, CHF, chronic renal failure, paroxysmal A-fib.with, hematochezia s/p bleeding scan and flex sigmoidoscopy, anemia, chronic LE stasis dermatitis  was was admitted for increased weakness and wheepy right LE and foot. The patient had a RLE ABDULAZIZ boot for chronic venous stasis ulcers and lower extremity chronic edema that was removed recently.. The daughter reports increased oozing from LE ulcers; she reports thet the right foot ulcers were debrided the day PTA. No fever or chills reported. In Ed patient noted to have Hb 6. He received vancomycin 1 gm IV x 1 and cefepime 1 gm IV x 1.    1. Chronic right LE stasis ulcers. Chronic dermatitis with likely superimposed cellulitis. Severe PVD s/p angioplasty. ARF improving. Severe anemia.  -right foot is wormer s/p angioplasty  -left foot wound is open; dry  -BC x 2 are negative to date  -renal function is improving  -on cefepime 1 gm IV qd # 12  -tolerating abx well so far; no side effects noted   -continue abx coverage for now  -monitor BMP  -local wound care  -monitor temps  -f/u CBC  -supportive care  2. Other issues: COPD, CHF, chronic renal failure, paroxysmal A-fib.with, hematochezia   -care per medicine

## 2017-05-15 NOTE — PROGRESS NOTE ADULT - ASSESSMENT
# ARYA due to pre-renal azothemia with CKD stage 4 at baseline ( 2.0-2.5)  # Anemia due to acute blood loss anemia  # Sepsis? due to LE ulcers  # Multiple DVT on RUE/RLE with s/p IVC fileter  # HTN    5/15 MK  - ARYA/CKD , near his baseline when he is on no diuretics.  Will restart lasix at 40 mg iv daily and moniter response  - PVD with cellulitis with stasis: now improving in appearance, no angio for now as per dr dugan.  abx as per dr rashid  - Anemia with acute blood loss, h/h stable # ARYA due to pre-renal azothemia with CKD stage 4 at baseline ( 2.0-2.5)  # Anemia due to acute blood loss anemia  # Sepsis? due to LE ulcers  # Multiple DVT on RUE/RLE with s/p IVC fileter  # HTN    5/15 MK  - ARYA/CKD , near his baseline when he is on no diuretics.  Will restart lasix at 40 mg iv daily and moniter response  - PVD with cellulitis with stasis: now improving in appearance, no angio for now as per dr dugan.  abx as per dr rashid  - Anemia with acute blood loss, h/h stable  - HYpernatremia: increase his intake of free water, chronically this range

## 2017-05-15 NOTE — PROGRESS NOTE ADULT - ASSESSMENT
improved perfusion R foot with eschar on dorsal surface that will require debridement; change to dry dressings    although perfusion of foot markedly improved c.f. pre intervention, patient may benefit from SFA revascularization to maximize chance at healing

## 2017-05-15 NOTE — PROGRESS NOTE ADULT - ASSESSMENT
anemia, likely multifactorial  I suspect that there is a component of GI bleeding, appears stabilized on PPI  and hct stable  He has received multiple units of packed red blood cells     protonix po bid     will delay EGD due to low o2 sat, DW family again  may want to hold off as stable for now  Serial H&H'    tolerating diet          Lower extremity DVT, status post IVC filter    Sepsis likely secondary to lower extremity cellulitis    Advanced COPD and obstructive sleep apnea reviewed.

## 2017-05-15 NOTE — PROGRESS NOTE ADULT - SUBJECTIVE AND OBJECTIVE BOX
CC: Anemia, LE wounds.     HPI: 82 year old male with history of CAD s/p stents (LAD, RCA bare metal around 9/16), A.Fib not on anticoagulation due to GI bleeding, Hypertension, COPD on home O2 2L, SHAYY on nocturnal BIPAP, ex-smoker (smoked 1ppd X 50 years, quit 22 years ago),  Chronic diastolic CHF, Hyperlipidemia, PVD, Iron deficiency anemia, Chronic back pain, Gout, BPH, CKD III with baseline Cr 2, recently admitted to  in 4/17 with anemia of GI bleeding, RLE and RUE DVTs, received pRBCs and IVC filter discharged to rehab with aspirin was sent to  ER on 5/4/17 for worsening anemia with Hb 6, worsening leg pain from ulcers and worsening renal function Cr 3.99. In ED, + guaiac.     Hospital course: prolonged including stabilization of suspected GIB with RBC transfusion and PPI BID. EGD on hold given ongoing LE wounds and angioplasty this admission. Patient underwent RLE stent and angioplasty with Vascular and prolonged IV Cefepime for wounds. HD stable.     5/15/17: NAD and willing to sit up in chair. No CP or SOB. Better kidney function today. Will pull hoffman and attempt voiding trial.     ROS: stated above.     Vital Signs Last 24 Hrs  T(C): 36.2, Max: 37.4 (05-14 @ 16:22)  T(F): 97.2, Max: 99.4 (05-14 @ 16:22)  HR: 83 (78 - 105)  BP: 131/40 (131/40 - 168/53)  BP(mean): 63 (61 - 81)  RR: 20 (15 - 22)  SpO2: 100% (89% - 100%)    Physical exam:  Appears chronically ill but in NAD, awake and alert.   HEENT: PERRLA  Lungs- decrease air entry at bases, no active wheezing.   S1S2, normal rhthym  Abd- soft, NT, BS+, obese and mildly distended.   Ext- dressing both legs recently changed with wounds improving per RN. + 2 LE edema.   : hoffman in place, yellow urine, notable scrotal edema.   Neuro- AAOx3    LABS:                                            10.2   6.4   )-----------( 302      ( 15 May 2017 05:19 )             33.5     05-15    148<H>  |  120<H>  |  80<H>  ----------------------------<  97  4.7   |  19<L>  |  1.49<H>    Ca    7.9<L>      15 May 2017 05:19      5/14 CXR: Since May 12, 2017, unchanged small left pleural effusion with basilar   atelectasis.    MEDICATIONS  (STANDING):  buDESOnide   0.5 milliGRAM(s) Respule 0.5milliGRAM(s) Inhalation two times a day  doxazosin 8milliGRAM(s) Oral at bedtime  isosorbide   mononitrate ER Tablet (IMDUR) 120milliGRAM(s) Oral daily  diltiazem   CD 120milliGRAM(s) Oral daily  gabapentin 300milliGRAM(s) Oral daily  allopurinol 100milliGRAM(s) Oral daily  fenofibrate Tablet 145milliGRAM(s) Oral daily  simvastatin 10milliGRAM(s) Oral at bedtime  pramipexole 0.125milliGRAM(s) Oral three times a day  ALBUTerol    90 MICROgram(s) HFA Inhaler 2Puff(s) Inhalation every 6 hours  ammonium lactate 12% Lotion 1Application(s) Topical two times a day  fluticasone propionate 50 MICROgram(s)/spray Nasal Spray 1Spray(s) Both Nostrils two times a day  metoprolol 12.5milliGRAM(s) Oral two times a day  cefepime  IVPB 1000milliGRAM(s) IV Intermittent every 24 hours  hydrALAZINE 150milliGRAM(s) Oral two times a day  lactobacillus acidophilus 1Tablet(s) Oral daily  gabapentin 600milliGRAM(s) Oral at bedtime  pantoprazole  Injectable 40milliGRAM(s) IV Push every 12 hours  furosemide   Injectable 40milliGRAM(s) IV Push daily        Assessment/Plan:   This is an 82 year old male with history of CAD s/p stents (LAD, RCA bare metal around 9/16), A.Fib not on anticoagulation due to GI bleeding, Hypertension, COPD on home O2 2L,   Chronic diastolic CHF, Hyperlipidemia, PVD, CKD III with baseline Cr 2 admitted for anemia and GIB now stable with ongoing LE wounds s/p angioplasty with vascular surgery on abx:    #Severe PAD.   - 05/11 endarterectomy of CFA plaque in an attempt of limb preservation.  05/08 angiogram + angioplasy.  stenting of right common iliac + external iliac arteries.  statin.   Further plan as per DR Clement.  - for now no further plans for further angioplasty.   - will consult PT, non-weightbearing on RLE for pain control and f/u Vascular recs.     #chronic right LE stasis ulcers, dermatitis w/ cellulitis.  low-grade temps.  c/w cefepime day #12.  ID following.    #GIB/acute blood loss anemia.  s/p 5 units PRBCs. No plans for EGD given ongoing resp issues on chronic O2.   Cont PPI BID per GI. Hb ~10.   GI f/u appreciated    #pulmonary vascular congestion and LE edema.   # Scrotal Edema  - pull hoffman, start voiding trial. May need to consult urology if unable to void.   - started on IV Lasix today.     #ARYA upon CKD.  Cr improved, back on Lasix.    Nephrology following.    #multiple DVT RUE/RLL.  s/p IVC Filter.    DVT ppx: no pharmacological ppx 2/2 GIB, no venodynes 2/2 LLE wounds. Will start mobilization as tolerated.     Dispo: remain on CICU for closer monitoring.   Total time 50 mins.

## 2017-05-16 LAB
HCT VFR BLD CALC: 34.6 % — LOW (ref 39–50)
HGB BLD-MCNC: 10.8 G/DL — LOW (ref 13–17)
SURGICAL PATHOLOGY FINAL REPORT - CH: SIGNIFICANT CHANGE UP

## 2017-05-16 RX ORDER — IPRATROPIUM/ALBUTEROL SULFATE 18-103MCG
3 AEROSOL WITH ADAPTER (GRAM) INHALATION ONCE
Qty: 0 | Refills: 0 | Status: COMPLETED | OUTPATIENT
Start: 2017-05-16 | End: 2017-05-18

## 2017-05-16 RX ORDER — HYDROMORPHONE HYDROCHLORIDE 2 MG/ML
0.5 INJECTION INTRAMUSCULAR; INTRAVENOUS; SUBCUTANEOUS EVERY 4 HOURS
Qty: 0 | Refills: 0 | Status: DISCONTINUED | OUTPATIENT
Start: 2017-05-16 | End: 2017-05-22

## 2017-05-16 RX ADMIN — GABAPENTIN 300 MILLIGRAM(S): 400 CAPSULE ORAL at 13:00

## 2017-05-16 RX ADMIN — Medication 145 MILLIGRAM(S): at 13:01

## 2017-05-16 RX ADMIN — HYDROMORPHONE HYDROCHLORIDE 0.5 MILLIGRAM(S): 2 INJECTION INTRAMUSCULAR; INTRAVENOUS; SUBCUTANEOUS at 06:42

## 2017-05-16 RX ADMIN — PRAMIPEXOLE DIHYDROCHLORIDE 0.12 MILLIGRAM(S): 0.12 TABLET ORAL at 12:59

## 2017-05-16 RX ADMIN — ALBUTEROL 2 PUFF(S): 90 AEROSOL, METERED ORAL at 10:30

## 2017-05-16 RX ADMIN — Medication 8 MILLIGRAM(S): at 22:01

## 2017-05-16 RX ADMIN — PRAMIPEXOLE DIHYDROCHLORIDE 0.12 MILLIGRAM(S): 0.12 TABLET ORAL at 06:30

## 2017-05-16 RX ADMIN — ALBUTEROL 2 PUFF(S): 90 AEROSOL, METERED ORAL at 14:12

## 2017-05-16 RX ADMIN — Medication 120 MILLIGRAM(S): at 06:29

## 2017-05-16 RX ADMIN — GABAPENTIN 600 MILLIGRAM(S): 400 CAPSULE ORAL at 21:59

## 2017-05-16 RX ADMIN — PANTOPRAZOLE SODIUM 40 MILLIGRAM(S): 20 TABLET, DELAYED RELEASE ORAL at 06:27

## 2017-05-16 RX ADMIN — Medication 12.5 MILLIGRAM(S): at 18:44

## 2017-05-16 RX ADMIN — Medication 1 APPLICATION(S): at 13:01

## 2017-05-16 RX ADMIN — Medication 1 SPRAY(S): at 22:04

## 2017-05-16 RX ADMIN — Medication 0.5 MILLIGRAM(S): at 19:12

## 2017-05-16 RX ADMIN — Medication 12.5 MILLIGRAM(S): at 06:30

## 2017-05-16 RX ADMIN — ALBUTEROL 2 PUFF(S): 90 AEROSOL, METERED ORAL at 19:22

## 2017-05-16 RX ADMIN — SIMVASTATIN 10 MILLIGRAM(S): 20 TABLET, FILM COATED ORAL at 22:00

## 2017-05-16 RX ADMIN — PANTOPRAZOLE SODIUM 40 MILLIGRAM(S): 20 TABLET, DELAYED RELEASE ORAL at 18:44

## 2017-05-16 RX ADMIN — Medication 1 TABLET(S): at 13:00

## 2017-05-16 RX ADMIN — Medication 1 SPRAY(S): at 06:33

## 2017-05-16 RX ADMIN — TRAMADOL HYDROCHLORIDE 100 MILLIGRAM(S): 50 TABLET ORAL at 18:48

## 2017-05-16 RX ADMIN — Medication 40 MILLIGRAM(S): at 06:29

## 2017-05-16 RX ADMIN — PRAMIPEXOLE DIHYDROCHLORIDE 0.12 MILLIGRAM(S): 0.12 TABLET ORAL at 22:00

## 2017-05-16 RX ADMIN — Medication 0.5 MILLIGRAM(S): at 10:29

## 2017-05-16 RX ADMIN — Medication 1 APPLICATION(S): at 18:56

## 2017-05-16 RX ADMIN — ISOSORBIDE MONONITRATE 120 MILLIGRAM(S): 60 TABLET, EXTENDED RELEASE ORAL at 13:00

## 2017-05-16 RX ADMIN — Medication 100 MILLIGRAM(S): at 13:00

## 2017-05-16 RX ADMIN — Medication 150 MILLIGRAM(S): at 06:29

## 2017-05-16 RX ADMIN — Medication 150 MILLIGRAM(S): at 18:44

## 2017-05-16 NOTE — PROGRESS NOTE ADULT - ASSESSMENT
# ARYA due to pre-renal azothemia with CKD stage 4 at baseline ( 2.0-2.5)  # Anemia due to acute blood loss anemia  # Sepsis? due to LE ulcers  # Multiple DVT on RUE/RLE with s/p IVC fileter  # HTN    5/15 MK  - ARYA/CKD , near his baseline when he is on no diuretics.  Will restart lasix at 40 mg iv daily and moniter response  - PVD with cellulitis with stasis: now improving in appearance, no angio for now as per dr dugan.  abx as per dr rashid  - Anemia with acute blood loss, h/h stable  - HYpernatremia: increase his intake of free water, chronically this range    5/16 SY  --ARYA/CKD   improved and stable.  Tolerated angiogram with stent without acute event.  No further studies planned at this point.  Lasix resumed.  Follow fluid status closely  --Hypernatremia : expect improvement with diuretics.  --PVD /LE edema and cellulitis.  Post Angiogram and stent placement,  Abtx completed.  Continue wound care.

## 2017-05-16 NOTE — PROGRESS NOTE ADULT - SUBJECTIVE AND OBJECTIVE BOX
Patient is a 82y old  Male who presents with a chief complaint of weakness  HPI:  82 y/o male with h/of COPD, CHF, chronic renal failure, paroxysmal A-fib.with, hematochezia s/p bleeding scan and flex sigmoidoscopy, anemia, chronic LE stasis dermatitis  was was admitted for increased weakness and wheepy right LE and foot. The patient had a RLE ABDULAZIZ boot for chronic venous stasis ulcers and lower extremity chronic edema that was removed recently.. The daughter reports increased oozing from LE ulcers; she reports thet the right foot ulcers were debrided the day PTA. No fever or chills reported. In Ed patient noted to have Hb 6. He received vancomycin 1 gm IV x 1 and cefepime 1 gm IV x 1.    s/p angioplasty  Alert and verbal  Lying in bed in NAD  Denies pain  No fever or chills    MEDICATIONS  (STANDING):  buDESOnide   0.5 milliGRAM(s) Respule 0.5milliGRAM(s) Inhalation two times a day  doxazosin 8milliGRAM(s) Oral at bedtime  isosorbide   mononitrate ER Tablet (IMDUR) 120milliGRAM(s) Oral daily  diltiazem   CD 120milliGRAM(s) Oral daily  gabapentin 300milliGRAM(s) Oral daily  allopurinol 100milliGRAM(s) Oral daily  fenofibrate Tablet 145milliGRAM(s) Oral daily  simvastatin 10milliGRAM(s) Oral at bedtime  pramipexole 0.125milliGRAM(s) Oral three times a day  ALBUTerol    90 MICROgram(s) HFA Inhaler 2Puff(s) Inhalation every 6 hours  ammonium lactate 12% Lotion 1Application(s) Topical two times a day  fluticasone propionate 50 MICROgram(s)/spray Nasal Spray 1Spray(s) Both Nostrils two times a day  metoprolol 12.5milliGRAM(s) Oral two times a day  cefepime  IVPB 1000milliGRAM(s) IV Intermittent every 24 hours  hydrALAZINE 150milliGRAM(s) Oral two times a day  lactobacillus acidophilus 1Tablet(s) Oral daily  gabapentin 600milliGRAM(s) Oral at bedtime  pantoprazole  Injectable 40milliGRAM(s) IV Push every 12 hours  furosemide   Injectable 40milliGRAM(s) IV Push daily  ALBUTerol/ipratropium for Nebulization. 3milliLiter(s) Nebulizer once    MEDICATIONS  (PRN):  traMADol 100milliGRAM(s) Oral every 6 hours PRN Moderate Pain (4 - 6)  aluminum hydroxide/magnesium hydroxide/simethicone Suspension 30milliLiter(s) Oral every 6 hours PRN Dyspepsia  oxyCODONE  5 mG/acetaminophen 325 mG 1Tablet(s) Oral every 4 hours PRN Severe Pain (7 - 10)      Vital Signs Last 24 Hrs  T(C): 37.2, Max: 37.2 (05-15 @ 23:23)  T(F): 98.9, Max: 98.9 (05-15 @ 23:23)  HR: 85 (73 - 101)  BP: 149/41 (121/42 - 157/55)  BP(mean): 69 (60 - 83)  RR: 18 (14 - 24)  SpO2: 99% (91% - 100%)    Physical Exam:    Constitutional: frail looking  HEENT: NC/AT, EOMI, PERRLA  Neck: supple  Back: no tenderness  Respiratory: few basal rales  Cardiovascular: S1S2 regular, no murmurs  Abdomen: soft, not tender, not distended, positive BS  Genitourinary: deferred  Rectal: deferred  Musculoskeletal: no muscle tenderness, no joint swelling or tenderness  Extremities: b/l chronic skin changes with edema; RLE: dorsal aspect of foot and lower ankle superficial ulcers dry; right foot feels warmer; mild right led edema  Neurological: confused, moving all extremities, no focal deficits  Skin: no rashes    Labs:                        10.8   x     )-----------( x        ( 16 May 2017 07:48 )             34.6     05-15    148<H>  |  120<H>  |  80<H>  ----------------------------<  97  4.7   |  19<L>  |  1.49<H>    Ca    7.9<L>      15 May 2017 05:19                 10.2   6.4   )-----------( 302      ( 15 May 2017 05:19 )             33.5     05-15    148<H>  |  120<H>  |  80<H>  ----------------------------<  97  4.7   |  19<L>  |  1.49<H>    Ca    7.9<L>      15 May 2017 05:19               10.7   8.3   )-----------( 244      ( 12 May 2017 06:42 )             34.0     05-12    145  |  118<H>  |  95<H>  ----------------------------<  124<H>  5.4<H>   |  18<L>  |  2.09<H>    Ca    7.8<L>      12 May 2017 05:33                 10.8   8.2   )-----------( 231      ( 11 May 2017 12:55 )             33.7     05-11    146<H>  |  116<H>  |  112<H>  ----------------------------<  116<H>  5.1   |  18<L>  |  2.40<H>    Ca    7.6<L>      11 May 2017 12:55               8.3    10.1  )-----------( 282      ( 09 May 2017 04:53 )             25.0     05-09    141  |  111<H>  |  118<H>  ----------------------------<  90  5.2   |  19<L>  |  2.58<H>    Ca    7.9<L>      09 May 2017 04:53               7.4    13.7  )-----------( 354      ( 07 May 2017 06:47 )             23.9     05-07    148<H>  |  115<H>  |  136<H>  ----------------------------<  166<H>  4.8   |  22  |  3.42<H>    Ca    8.2<L>      07 May 2017 06:47                 8.3    14.5  )-----------( 370      ( 06 May 2017 04:43 )             26.7     05-06    153<H>  |  121<H>  |  142<H>  ----------------------------<  101<H>  5.0   |  20<L>  |  3.67<H>    Ca    8.2<L>      06 May 2017 04:43  Mg     2.8     05-05    TPro  6.6  /  Alb  2.4<L>  /  TBili  0.2  /  DBili  x   /  AST  20  /  ALT  15  /  AlkPhos  72  05-04               6.8    14.1  )-----------( 375      ( 05 May 2017 05:10 )             20.1     05-05    149<H>  |  118<H>  |  143<H>  ----------------------------<  109<H>  4.9   |  21<L>  |  3.68<H>    Ca    8.3<L>      05 May 2017 05:10  Mg     2.8     05-05    TPro  6.6  /  Alb  2.4<L>  /  TBili  0.2  /  DBili  x   /  AST  20  /  ALT  15  /  AlkPhos  72  05-04               6.3    10.8  )-----------( 398      ( 04 May 2017 12:54 )             21.8     05-04    145  |  112<H>  |  138<H>  ----------------------------<  123<H>  5.5<H>   |  19<L>  |  3.99<H>    Ca    8.8      04 May 2017 12:54    TPro  6.6  /  Alb  2.4<L>  /  TBili  0.2  /  DBili  x   /  AST  20  /  ALT  15  /  AlkPhos  72  05-04     LIVER FUNCTIONS - ( 04 May 2017 12:54 )  Alb: 2.4 g/dL / Pro: 6.6 gm/dL / ALK PHOS: 72 U/L / ALT: 15 U/L / AST: 20 U/L / GGT: x           Culture - Blood (05.04.17 @ 13:21)    Specimen Source: .Blood None    Culture Results:   No growth to date.        Radiology:    Advanced directives addressed: full resuscitation

## 2017-05-16 NOTE — PROGRESS NOTE ADULT - ASSESSMENT
82 y/o male with h/o COPD, CHF, chronic renal failure, paroxysmal A-fib.with, hematochezia s/p bleeding scan and flex sigmoidoscopy, anemia, chronic LE stasis dermatitis  was was admitted for increased weakness and wheepy right LE and foot. The patient had a RLE ABDULAZIZ boot for chronic venous stasis ulcers and lower extremity chronic edema that was removed recently.. The daughter reports increased oozing from LE ulcers; she reports thet the right foot ulcers were debrided the day PTA. No fever or chills reported. In Ed patient noted to have Hb 6. He received vancomycin 1 gm IV x 1 and cefepime 1 gm IV x 1.    1. Chronic right LE stasis ulcers. Chronic dermatitis. Cellulitis resolving. Severe PVD s/p angioplasty. CRF.  -right foot is wormer s/p angioplasty  -left foot wound is open; dry  -BC x 2 are negative to date  -renal function is improving  -on cefepime 1 gm IV qd # 13  -tolerating abx well so far; no side effects noted   -complete abx therapy today  -local wound care per surgery  -monitor temps  -f/u CBC  -supportive care  2. Other issues: COPD, CHF, chronic renal failure, paroxysmal A-fib.with, hematochezia   -care per medicine

## 2017-05-16 NOTE — PROGRESS NOTE ADULT - SUBJECTIVE AND OBJECTIVE BOX
NEPHROLOGY INTERVAL HPI/OVERNIGHT EVENTS:  5/16 SY  Feeling fair.  Denies SOB.    81 y/o WM well known to me with CKD stage 4 at baseline with scr of 2.0-2.5 at baseline, transferred from SNF with worseing LE pain, and Altered MS.  Pt upon admission noted with Scr of 3.99 and weeping LE with hx of severe PVD on UNNA boots. Hgb was in 6. Currently in the process of receiving 4/4 PRBC with lasix in between.  UOP + with [place] catheter placed.    Hx of recent Dieulafoy bleed and planned for repeat EGD this afternoon.   Remains with altered MS    As per daughters, had been having variable renal function at facility and was given lasix with IVF alternating.      PAST MEDICAL & SURGICAL HISTORY:  -CKD stage 4 with scr of 2-2.5  -BPH (benign prostatic hypertrophy)  -Afib not on AC due to GI bleed  -Hyperlipemia  -Coronary artery disease  -Hypertension  -Dyspepsia  -COPD/SHAYY home 02 therapy, and treatment with BIPAP  -Pleural effusion, bilateral  -Peripheral edema  -Gout  -Spinal stenosis  -Hypercholesterolemia  -GERD (gastroesophageal reflux disease)  -CAD (coronary artery disease)  -S/P angioplasty with stent  -Cataract of left eye  -Prostate: Surgery green light procedure.  -S/P rotator cuff surgery: Right    MEDICATIONS  (STANDING):  buDESOnide   0.5 milliGRAM(s) Respule 0.5milliGRAM(s) Inhalation two times a day  doxazosin 8milliGRAM(s) Oral at bedtime  isosorbide   mononitrate ER Tablet (IMDUR) 120milliGRAM(s) Oral daily  diltiazem   CD 120milliGRAM(s) Oral daily  gabapentin 300milliGRAM(s) Oral daily  allopurinol 100milliGRAM(s) Oral daily  fenofibrate Tablet 145milliGRAM(s) Oral daily  simvastatin 10milliGRAM(s) Oral at bedtime  pramipexole 0.125milliGRAM(s) Oral three times a day  ALBUTerol    90 MICROgram(s) HFA Inhaler 2Puff(s) Inhalation every 6 hours  ammonium lactate 12% Lotion 1Application(s) Topical two times a day  fluticasone propionate 50 MICROgram(s)/spray Nasal Spray 1Spray(s) Both Nostrils two times a day  metoprolol 12.5milliGRAM(s) Oral two times a day  hydrALAZINE 150milliGRAM(s) Oral two times a day  lactobacillus acidophilus 1Tablet(s) Oral daily  gabapentin 600milliGRAM(s) Oral at bedtime  pantoprazole  Injectable 40milliGRAM(s) IV Push every 12 hours  furosemide   Injectable 40milliGRAM(s) IV Push daily  ALBUTerol/ipratropium for Nebulization. 3milliLiter(s) Nebulizer once    MEDICATIONS  (PRN):  traMADol 100milliGRAM(s) Oral every 6 hours PRN Moderate Pain (4 - 6)  aluminum hydroxide/magnesium hydroxide/simethicone Suspension 30milliLiter(s) Oral every 6 hours PRN Dyspepsia  oxyCODONE  5 mG/acetaminophen 325 mG 1Tablet(s) Oral every 4 hours PRN Severe Pain (7 - 10)  HYDROmorphone  Injectable 0.5milliGRAM(s) IV Push every 4 hours PRN Severe Pain (7 - 10)          Vital Signs Last 24 Hrs  T(C): 37.2, Max: 37.2 (05-15 @ 23:23)  T(F): 98.9, Max: 98.9 (05-15 @ 23:23)  HR: 74 (73 - 101)  BP: 140/57 (121/42 - 157/55)  BP(mean): 79 (60 - 83)  RR: 18 (14 - 24)  SpO2: 97% (91% - 100%)  Daily     Daily   I & Os for 24h ending 05-16 @ 07:00  =============================================  IN: 410 ml / OUT: 1400 ml / NET: -990 ml    I & Os for current day (as of 05-16 @ 12:29)  =============================================  IN: 0 ml / OUT: 400 ml / NET: -400 ml      PHYSICAL EXAM:  Alert and appropriate  GENERAL: Resting comfortably  CHEST/LUNG: clear to aus  HEART: S1S2 RRR  ABDOMEN: soft  EXTREMITIES: Bilateral dressings.  Increased edema  SKIN:     LABS:                        10.8   x     )-----------( x        ( 16 May 2017 07:48 )             34.6     05-15    148<H>  |  120<H>  |  80<H>  ----------------------------<  97  4.7   |  19<L>  |  1.49<H>    Ca    7.9<L>      15 May 2017 05:19                  RADIOLOGY & ADDITIONAL TESTS:

## 2017-05-16 NOTE — PROGRESS NOTE ADULT - SUBJECTIVE AND OBJECTIVE BOX
no significant changes  no bleeding  Feldman removed and he has voided    MEDICATIONS  (STANDING):  buDESOnide   0.5 milliGRAM(s) Respule 0.5milliGRAM(s) Inhalation two times a day  doxazosin 8milliGRAM(s) Oral at bedtime  isosorbide   mononitrate ER Tablet (IMDUR) 120milliGRAM(s) Oral daily  diltiazem   CD 120milliGRAM(s) Oral daily  gabapentin 300milliGRAM(s) Oral daily  allopurinol 100milliGRAM(s) Oral daily  fenofibrate Tablet 145milliGRAM(s) Oral daily  simvastatin 10milliGRAM(s) Oral at bedtime  pramipexole 0.125milliGRAM(s) Oral three times a day  ALBUTerol    90 MICROgram(s) HFA Inhaler 2Puff(s) Inhalation every 6 hours  ammonium lactate 12% Lotion 1Application(s) Topical two times a day  fluticasone propionate 50 MICROgram(s)/spray Nasal Spray 1Spray(s) Both Nostrils two times a day  metoprolol 12.5milliGRAM(s) Oral two times a day  cefepime  IVPB 1000milliGRAM(s) IV Intermittent every 24 hours  hydrALAZINE 150milliGRAM(s) Oral two times a day  lactobacillus acidophilus 1Tablet(s) Oral daily  gabapentin 600milliGRAM(s) Oral at bedtime  pantoprazole  Injectable 40milliGRAM(s) IV Push every 12 hours  furosemide   Injectable 40milliGRAM(s) IV Push daily    MEDICATIONS  (PRN):  traMADol 100milliGRAM(s) Oral every 6 hours PRN Moderate Pain (4 - 6)  aluminum hydroxide/magnesium hydroxide/simethicone Suspension 30milliLiter(s) Oral every 6 hours PRN Dyspepsia  oxyCODONE  5 mG/acetaminophen 325 mG 1Tablet(s) Oral every 4 hours PRN Severe Pain (7 - 10)      Allergies    No Known Allergies    Intolerances        Flatus: [ ] YES [ ] NO             Bowel Movement: [ ] YES [ ] NO  Pain (0-10):            Pain Control Adequate: [ ] YES [ ] NO  Nausea: [ ] YES [ ] NO            Vomiting: [ ] YES [ ] NO  Diarrhea: [ ] YES [ ] NO         Constipation: [ ] YES [ ] NO     Chest Pain: [ ] YES [ ] NO    SOB:  [ ] YES [ ] NO    Vital Signs Last 24 Hrs  T(C): 37.2, Max: 37.2 (05-15 @ 23:23)  T(F): 98.9, Max: 98.9 (05-15 @ 23:23)  HR: 89 (73 - 103)  BP: 157/55 (121/42 - 159/51)  BP(mean): 79 (60 - 83)  RR: 18 (14 - 24)  SpO2: 97% (91% - 100%)    I&O's Summary    I & Os for current day (as of 16 May 2017 07:27)  =============================================  IN: 410 ml / OUT: 1400 ml / NET: -990 ml      Physical Exam:  General: NAD, resting comfortably  Pulmonary: normal resp effort, CTA-B  Cardiovascular: NSR  Abdominal: soft, NT/ND  Extremities: WWP, normal strength  Neuro: A/O x 3, CNs II-XII grossly intact, normal motor/sensation, no focal deficits  Pulses:   R foot pink and warm, dressing in place; L foot pink and warm    LABS:                        10.2   6.4   )-----------( 302      ( 15 May 2017 05:19 )             33.5     05-15    148<H>  |  120<H>  |  80<H>  ----------------------------<  97  4.7   |  19<L>  |  1.49<H>    Ca    7.9<L>      15 May 2017 05:19            CAPILLARY BLOOD GLUCOSE      RADIOLOGY & ADDITIONAL TESTS:

## 2017-05-16 NOTE — PROGRESS NOTE ADULT - SUBJECTIVE AND OBJECTIVE BOX
Patient is a 82y old  Male who presents with a chief complaint of Worsening leg pain, anemia, ARYA sent from Rehab (04 May 2017 15:56)      HPI:  82 year old male with history of CAD s/p stents (LAD, RCA bare metal around 9/16), A.Fib not on anticoagulation due to GI bleeding, Hypertension, COPD on home O2 2L, SHAYY on nocturnal BIPAP, ex-smoker (smoked 1ppd X 50 years, quit 22 years ago),  Chronic diastolic CHF, Hyperlipidemia, PVD, Iron deficiency anemia, Chronic back pain, Gout, BPH, CKD III with baseline Cr 2, recently admitted to  in 4/17 with anemia of GI bleeding, RLE and RUE DVTs, received pRBCs and IVC filter discharged to rehab with aspirin was sent to  ER on 5/4/17 for worsening anemia with Hb 6, worsening leg pain from ulcers and worsening renal function Cr 3.99.    Pt seen bed side.  Not well oriented currently after receiving Dilaudid for pain in ER.  As per daughters, pt was walking with a walker at rehab and was doing better.  Pt was found to have tarry black stools in ER, guaic positive. (04 May 2017 15:56)    data rev with pt's RN and DTR at bedside today  tolerated his vascular surgery well , extubated and is having bkfst in bed this am  no cp  no sig cough or wheeze  uses his biapp at night    5/13  needed diuresis yesterday  feels better today  DTr at bedside  uses incentive spirometry  no cp  5/16  pt's dtr at bedside  feels ok  no breathing issues  Dr Clement's eval in progress and may need repeat OR  PAST MEDICAL & SURGICAL HISTORY:  Sepsis, due to unspecified organism: 2/2 poorly healing wounds b/l  BPH (benign prostatic hypertrophy)  Hyperlipemia  Coronary artery disease  Hypertension  Dyspepsia: On moderate exertion.  Sleep apnea, obstructive: Requires home 02 therapy, and treatment with BIPAP  Atelectasis  Pleural effusion, bilateral  Respiratory failure  Peripheral edema  CRI (chronic renal insufficiency)  Gout  CRF (chronic renal failure)  Benign prostatic hypertrophy  Spinal stenosis  Hypercholesterolemia  GERD (gastroesophageal reflux disease)  CAD (coronary artery disease)  Hypertension  S/P angioplasty with stent  Cataract of left eye  Prostate: Surgery green light procedure.  S/P rotator cuff surgery: Right  S/P angioplasty  Rotator cuff tear, right: repair      PREVIOUS DIAGNOSTIC TESTING:      MEDICATIONS  (STANDING):  buDESOnide   0.5 milliGRAM(s) Respule 0.5milliGRAM(s) Inhalation two times a day  doxazosin 8milliGRAM(s) Oral at bedtime  isosorbide   mononitrate ER Tablet (IMDUR) 120milliGRAM(s) Oral daily  diltiazem   CD 120milliGRAM(s) Oral daily  gabapentin 300milliGRAM(s) Oral daily  allopurinol 100milliGRAM(s) Oral daily  fenofibrate Tablet 145milliGRAM(s) Oral daily  simvastatin 10milliGRAM(s) Oral at bedtime  pramipexole 0.125milliGRAM(s) Oral three times a day  ALBUTerol    90 MICROgram(s) HFA Inhaler 2Puff(s) Inhalation every 6 hours  ammonium lactate 12% Lotion 1Application(s) Topical two times a day  fluticasone propionate 50 MICROgram(s)/spray Nasal Spray 1Spray(s) Both Nostrils two times a day  metoprolol 12.5milliGRAM(s) Oral two times a day  cefepime  IVPB 1000milliGRAM(s) IV Intermittent every 24 hours  hydrALAZINE 150milliGRAM(s) Oral two times a day  lactobacillus acidophilus 1Tablet(s) Oral daily  gabapentin 600milliGRAM(s) Oral at bedtime  pantoprazole  Injectable 40milliGRAM(s) IV Push every 12 hours  furosemide   Injectable 40milliGRAM(s) IV Push daily    MEDICATIONS  (PRN):  traMADol 100milliGRAM(s) Oral every 6 hours PRN Moderate Pain (4 - 6)  aluminum hydroxide/magnesium hydroxide/simethicone Suspension 30milliLiter(s) Oral every 6 hours PRN Dyspepsia  oxyCODONE  5 mG/acetaminophen 325 mG 1Tablet(s) Oral every 4 hours PRN Severe Pain (7 - 10)        FAMILY HISTORY:  No pertinent family history in first degree relatives      SOCIAL HISTORY:  ***    REVIEW OF SYSTEM:  Pertinent items are noted in HPI.  Constitutional negative for chills, fevers, sweats and weight loss  throat, and face:  negative for epistaxis, nasal congestion, sore throat and   tinnitus  Respiratory:pos dyspnea on exertion, pleuritic chest pain  and wheezing  Cardiovascular:  negative for chest pain, dyspnea and palpitations  Gastrointestinal: negative for abdominal pain, diarrhea, nausea and vomiting  Genitourinary: negative for dysuria, frequency and urinary incontinence  Skin:  negative for redness, rash, pruritus, swelling, dryness and   fissures  Hematologic/lymphatic: negative for bleeding and easy bruising  Musculoskeletal: posfor arthralgias, back pain and muscle weakness  Neurological: negative for dizziness, headaches, seizures and tremors  Behavioral/Psych:  negative for mood change, depression, suicidal attempts      Vital Signs Last 24 Hrs  T(C): 37.2, Max: 37.2 (05-15 @ 23:23)  T(F): 98.9, Max: 98.9 (05-15 @ 23:23)  HR: 89 (73 - 103)  BP: 157/55 (121/42 - 159/51)  BP(mean): 79 (60 - 83)  RR: 18 (14 - 24)  SpO2: 97% (91% - 100%)    I&O's Summary    I & Os for current day (as of 12 May 2017 08:38)  =============================================  IN: 3863.8 ml / OUT: 1260 ml / NET: 2603.8 ml    PHYSICAL EXAM  General Appearance: cooperative, no acute distress,   HEENT: PERRL, conjunctiva clear, EOM's intact,   Neck: Supple, , no adenopathy, thyroid: not enlarged, no carotid bruit or JVD  Back: Symmetric, no  tenderness,no soft tissue tenderness  Lungs: Clear to auscultation bilateral,no adventitious breath sounds, normal   expiratory phase, few bibasilar rales  Heart: Regular rate and rhythm, S1, S2 normal, no murmur, rub or gallop  Abdomen: Soft, non-tender, bowel sounds active , no hepatosplenomegaly  Extremities:cyanotic right toes, chrnic peripheral/ distal ulcerations/ peripheral edema  Skin: Skin color, texture normal, no rashes   Neurologic: Alert and oriented X3 , cranial nerves intact, sensory and motor normal,    ECG:    LABS:                        10.2   6.4   )-----------( 302      ( 15 May 2017 05:19 )             33.5   05-15    148<H>  |  120<H>  |  80<H>  ----------------------------<  97  4.7   |  19<L>  |  1.49<H>    Ca    7.9<L>      15 May 2017 05:19                            10.7   8.3   )-----------( 244      ( 12 May 2017 06:42 )             34.0     05-12    145  |  118<H>  |  95<H>  ----------------------------<  124<H>  5.4<H>   |  18<L>  |  2.09<H>    Ca    7.8<L>      12 May 2017 05:33      CARDIAC MARKERS ( 12 May 2017 05:33 )  0.026 ng/mL / x     / x     / x     / x      CARDIAC MARKERS ( 11 May 2017 12:55 )  0.033 ng/mL / x     / x     / x     / x              PROCEDURE DATE:  05/04/2017        INTERPRETATION:  History: GI bleed admission    Chest:  one view.      Comparison: 3/31/2017    AP radiograph of the chest demonstrates no evidence of infiltrate,   pleural effusion or vascular congestion. No atelectasis is seen. The   cardiac silhouette is normal in size. Osseous structures are intact.    Impression: No active pulmonary disease.    Contrast:     Oral contrast only    Comparison: CT abdomen and pelvis 6/13/2016    Findings:  LIVER: Normal.  SPLEEN: Normal.  PANCREAS: Normal.  GALLBLADDER/BILIARY TREE: Nondilated. Gallstone is present.  ADRENALS: Normal.  KIDNEYS: No calcification, hydronephrosis, or soft tissue attenuating   renal mass.  LYMPHADENOPATHY/RETROPERITONEUM: No adenopathy.  VASCULATURE: Extensive aortoiliac vascular calcification without   aneurysm. Infrarenal vena cava filter in place.  BOWEL: No bowel related abnormality. Specifically, no bowel obstruction.  PELVIC VISCERA: Calcified BPH in the prostate is noted.  PELVIC LYMPH NODES: No pelvic adenopathy.  PERITONEUM/ABDOMINAL WALL: No free air orascites.  SKELETAL: No acute bony abnormality.  LUNG BASES: Clear.    IMPRESSION:     Preponderance of abdominal visceral adipose tissue without evidence for   obstruction, mass, or ascites.          RADIOLOGY & ADDITIONAL STUDIES:  cxr noted and results discussed with pt and dtr today  5/12  mild PVM and small left sided effusion    PROCEDURE DATE:  05/12/2017        INTERPRETATION:  CHEST SINGLE VIEW FRONTAL    Single AP view    HISTORY:  preop    Comparison:  Chest x-ray 5/4/2017    The cardiac silhouette is within normal limits. Mild pulmonary vascular   congestion and small left effusion.    IMPRESSION: Mild pulmonary vascular congestion and small left pleural   effusion has developed.      PROCEDURE DATE:  05/14/2017        INTERPRETATION:  EXAMINATION DATE: May 14, 2017 at 0902 hours.  CLINICAL INFORMATION: CHF.    TECHNIQUE: Frontal view of the chest   COMPARISON: May 12, 2017.    FINDINGS:    LUNGS/PLEURA: Small left pleural effusion with basilar atelectasis,   unchanged. No pneumothorax.  MEDIASTINUM: Cardiac silhouette is enlarged.  OTHER: None.    IMPRESSION:     Since May 12, 2017, unchanged small left pleural effusion with basilar   atelectasis.

## 2017-05-16 NOTE — PROGRESS NOTE ADULT - SUBJECTIVE AND OBJECTIVE BOX
Patient is a 82y old  Male who presents with a chief complaint of Worsening leg pain, anemia, ARYA sent from Rehab (04 May 2017 15:56)      HPI:  82 year old male with history of CAD s/p stents (LAD, RCA bare metal around 9/16), A.Fib not on anticoagulation due to GI bleeding, Hypertension, COPD on home O2 2L, SHAYY on nocturnal BIPAP, ex-smoker (smoked 1ppd X 50 years, quit 22 years ago),  Chronic diastolic CHF, Hyperlipidemia, PVD, Iron deficiency anemia, Chronic back pain, Gout, BPH, CKD III with baseline Cr 2, recently admitted to  in 4/17 with anemia of GI bleeding, RLE and RUE DVTs, received pRBCs and IVC filter discharged to rehab with aspirin was sent to  ER on 5/4/17 for worsening anemia with Hb 6, worsening leg pain from ulcers and worsening renal function Cr 3.99.    pt comf  had melena last ngiht.  Per Rn stool had gotten lighter and now dark again  neg abd pain  neg CP or SOB  mild upper abd discomfort, feels like reflux  has LE pain      Pt seen bed side.  Not well oriented currently after receiving Dilaudid for pain in ER.  As per daughters, pt was walking with a walker at rehab and was doing better.  Pt was found to have tarry black stools in ER, guaic positive. (04 May 2017 15:56)      PAST MEDICAL & SURGICAL HISTORY:  Sepsis, due to unspecified organism: 2/2 poorly healing wounds b/l  BPH (benign prostatic hypertrophy)  Hyperlipemia  Coronary artery disease  Hypertension  Dyspepsia: On moderate exertion.  Sleep apnea, obstructive: Requires home 02 therapy, and treatment with BIPAP  Atelectasis  Pleural effusion, bilateral  Respiratory failure  Peripheral edema  CRI (chronic renal insufficiency)  Gout  CRF (chronic renal failure)  Benign prostatic hypertrophy  Spinal stenosis  Hypercholesterolemia  GERD (gastroesophageal reflux disease)  CAD (coronary artery disease)  Hypertension  S/P angioplasty with stent  Cataract of left eye  Prostate: Surgery green light procedure.  S/P rotator cuff surgery: Right  S/P angioplasty  Rotator cuff tear, right: repair      MEDICATIONS  (STANDING):  buDESOnide   0.5 milliGRAM(s) Respule 0.5milliGRAM(s) Inhalation two times a day  doxazosin 8milliGRAM(s) Oral at bedtime  isosorbide   mononitrate ER Tablet (IMDUR) 120milliGRAM(s) Oral daily  diltiazem   CD 120milliGRAM(s) Oral daily  gabapentin 300milliGRAM(s) Oral daily  allopurinol 100milliGRAM(s) Oral daily  fenofibrate Tablet 145milliGRAM(s) Oral daily  simvastatin 10milliGRAM(s) Oral at bedtime  pramipexole 0.125milliGRAM(s) Oral three times a day  ALBUTerol    90 MICROgram(s) HFA Inhaler 2Puff(s) Inhalation every 6 hours  ammonium lactate 12% Lotion 1Application(s) Topical two times a day  fluticasone propionate 50 MICROgram(s)/spray Nasal Spray 1Spray(s) Both Nostrils two times a day  metoprolol 12.5milliGRAM(s) Oral two times a day  cefepime  IVPB 1000milliGRAM(s) IV Intermittent every 24 hours  hydrALAZINE 150milliGRAM(s) Oral two times a day  lactobacillus acidophilus 1Tablet(s) Oral daily  gabapentin 600milliGRAM(s) Oral at bedtime  pantoprazole  Injectable 40milliGRAM(s) IV Push every 12 hours  furosemide   Injectable 40milliGRAM(s) IV Push daily    MEDICATIONS  (PRN):  traMADol 100milliGRAM(s) Oral every 6 hours PRN Moderate Pain (4 - 6)  aluminum hydroxide/magnesium hydroxide/simethicone Suspension 30milliLiter(s) Oral every 6 hours PRN Dyspepsia  oxyCODONE  5 mG/acetaminophen 325 mG 1Tablet(s) Oral every 4 hours PRN Severe Pain (7 - 10)      Allergies    No Known Allergies    Intolerances        SOCIAL HISTORY:unchanged    FAMILY HISTORY:  No pertinent family history in first degree relatives      REVIEW OF SYSTEMS:    CONSTITUTIONAL: No weakness, fevers or chills  EYES/ENT: No visual changes;  No vertigo or throat pain   NECK: No pain or stiffness  RESPIRATORY: No cough, wheezing, hemoptysis; No shortness of breath  CARDIOVASCULAR: No chest pain or palpitations  GENITOURINARY: No dysuria, frequency or hematuria  NEUROLOGICAL: No numbness or weakness  SKIN: No itching, burning, rashes, or lesions   All other review of systems is negative unless indicated above.    Vital Signs Last 24 Hrs  T(C): 37.2, Max: 37.2 (05-15 @ 23:23)  T(F): 98.9, Max: 98.9 (05-15 @ 23:23)  HR: 73 (73 - 103)  BP: 121/42 (121/42 - 159/51)  BP(mean): 62 (60 - 83)  RR: 15 (14 - 24)  SpO2: 91% (91% - 100%)    PHYSICAL EXAM:    Constitutional: NAD, well-developed  HEENT: EOMI, throat clear  Neck: No LAD, supple  Respiratory: CTA and P  Cardiovascular: S1 and S2, RRR, no M  Gastrointestinal: BS+, soft, NT/ND, neg HSM,  Extremities: pos peripheral edema, neg clubing, cyanosis, RLE ulcers  Vascular: 2+ peripheral pulses  Neurological: A/O x 3, no focal deficits  Psychiatric: Normal mood, normal affect  Skin: No rashes    LABS:  CBC Full  -  ( 15 May 2017 05:19 )  WBC Count : 6.4 K/uL  Hemoglobin : 10.2 g/dL  Hematocrit : 33.5 %  Platelet Count - Automated : 302 K/uL  Mean Cell Volume : 94.4 fl  Mean Cell Hemoglobin : 28.9 pg  Mean Cell Hemoglobin Concentration : 30.6 gm/dL  Auto Neutrophil # : x  Auto Lymphocyte # : x  Auto Monocyte # : x  Auto Eosinophil # : x  Auto Basophil # : x  Auto Neutrophil % : x  Auto Lymphocyte % : x  Auto Monocyte % : x  Auto Eosinophil % : x  Auto Basophil % : x    05-15    148<H>  |  120<H>  |  80<H>  ----------------------------<  97  4.7   |  19<L>  |  1.49<H>    Ca    7.9<L>      15 May 2017 05:19              RADIOLOGY & ADDITIONAL STUDIES:

## 2017-05-16 NOTE — PHYSICAL THERAPY INITIAL EVALUATION ADULT - TRANSFER TRAINING, PT EVAL
sit to stand MaxA x 2(performed with GARRISON-STEADY akira) sit to stand MaxA x 2(performed with FER champion)

## 2017-05-16 NOTE — PHYSICAL THERAPY INITIAL EVALUATION ADULT - ADDITIONAL COMMENTS
PLOF in March was Independent with cane (Household distances)  Has been very inactive last 2/3 months.

## 2017-05-16 NOTE — PHYSICAL THERAPY INITIAL EVALUATION ADULT - DID THE PATIENT HAVE SURGERY?
s/p angioplasty R common illiac, external illiac arteries on 5/8,  s/p Endarterectomy of CFA plaque  for R LE preservation/yes

## 2017-05-16 NOTE — PROGRESS NOTE ADULT - ASSESSMENT
anemia, likely multifactorial  I suspect that there is a component of GI bleeding, appears stabilized on PPI  and hct stable  but concerned with repeat melena.  this may be old  will check HCT and serial H H  cint PPI  He has received multiple units of packed red blood cells     protonix po bid   Serial H&H'    lower extremity DVT, status post IVC filter    Sepsis likely secondary to lower extremity cellulitis    Advanced COPD and obstructive sleep apnea reviewed.    MOM for daughter

## 2017-05-16 NOTE — PROGRESS NOTE ADULT - ASSESSMENT
82 year old male with history of CAD s/p stents (LAD, RCA bare metal around 9/16), A.Fib not on anticoagulation due to GI bleeding, Hypertension, COPD on home O2 2L, SHAYY on nocturnal BIPAP, ex-smoker (smoked 1ppd X 50 years, quit 22 years ago),  Chronic diastolic CHF, Hyperlipidemia, PVD, Iron deficiency anemia, Chronic back pain, Gout, BPH, CKD III with baseline Cr 2, recently admitted to  in 4/17 with anemia of GI bleeding, RLE and RUE DVTs, received pRBCs and IVC filter discharged to rehab with aspirin was sent to  ER on 5/4/17 for worsening anemia with Hb 6, worsening leg pain from ulcers and worsening renal function Cr 3.99.  s/p vascular surgery, extubated and tolerated well  Advanced COPD on home o2  no active bronchospasm  CHF , appears clinically stable/ wt reported 215 lbs  SHAYY on Bipap  rest pulm dysfunction due to obesity  pulm status appears optimized if needs additional surgical interventions  data rev with Dr Clement today and Dtr at [facility]  suggest  repeat cxr today  incentive spirometry  bronchodilator rx  nocturnal and post op BIPAP useful  if lethargic or any new resp sxs will need ABG  good response to diuresis  cxr showed mild CHF and new small left sided effusion  diuresis as per renal  fu cxr in am  all dis with dtr at bedside today 5/16  encourage incentive spirometry use  cont with Duoneb in nebulizer

## 2017-05-16 NOTE — PROGRESS NOTE ADULT - ASSESSMENT
reperfused R foot with ischemic eschar on dorsum that will need debridement    Will discuss with Dr. Candelaria re the risk of Plavix and GI bleeding should I attempt revascularization of occluded R SFA placing stents

## 2017-05-17 LAB
ANION GAP SERPL CALC-SCNC: 8 MMOL/L — SIGNIFICANT CHANGE UP (ref 5–17)
BUN SERPL-MCNC: 77 MG/DL — HIGH (ref 7–23)
CALCIUM SERPL-MCNC: 7.9 MG/DL — LOW (ref 8.5–10.1)
CHLORIDE SERPL-SCNC: 119 MMOL/L — HIGH (ref 96–108)
CO2 SERPL-SCNC: 23 MMOL/L — SIGNIFICANT CHANGE UP (ref 22–31)
CREAT SERPL-MCNC: 1.39 MG/DL — HIGH (ref 0.5–1.3)
GLUCOSE SERPL-MCNC: 106 MG/DL — HIGH (ref 70–99)
HCT VFR BLD CALC: 35.6 % — LOW (ref 39–50)
HGB BLD-MCNC: 10.8 G/DL — LOW (ref 13–17)
MCHC RBC-ENTMCNC: 28.8 PG — SIGNIFICANT CHANGE UP (ref 27–34)
MCHC RBC-ENTMCNC: 30.4 GM/DL — LOW (ref 32–36)
MCV RBC AUTO: 94.6 FL — SIGNIFICANT CHANGE UP (ref 80–100)
PLATELET # BLD AUTO: 300 K/UL — SIGNIFICANT CHANGE UP (ref 150–400)
POTASSIUM SERPL-MCNC: 4.7 MMOL/L — SIGNIFICANT CHANGE UP (ref 3.5–5.3)
POTASSIUM SERPL-SCNC: 4.7 MMOL/L — SIGNIFICANT CHANGE UP (ref 3.5–5.3)
RBC # BLD: 3.76 M/UL — LOW (ref 4.2–5.8)
RBC # FLD: 15.4 % — HIGH (ref 10.3–14.5)
SODIUM SERPL-SCNC: 150 MMOL/L — HIGH (ref 135–145)
WBC # BLD: 4.9 K/UL — SIGNIFICANT CHANGE UP (ref 3.8–10.5)
WBC # FLD AUTO: 4.9 K/UL — SIGNIFICANT CHANGE UP (ref 3.8–10.5)

## 2017-05-17 RX ORDER — FUROSEMIDE 40 MG
40 TABLET ORAL DAILY
Qty: 0 | Refills: 0 | Status: DISCONTINUED | OUTPATIENT
Start: 2017-05-17 | End: 2017-05-21

## 2017-05-17 RX ADMIN — Medication 12.5 MILLIGRAM(S): at 10:07

## 2017-05-17 RX ADMIN — Medication 150 MILLIGRAM(S): at 17:33

## 2017-05-17 RX ADMIN — Medication 145 MILLIGRAM(S): at 12:14

## 2017-05-17 RX ADMIN — ALBUTEROL 2 PUFF(S): 90 AEROSOL, METERED ORAL at 19:37

## 2017-05-17 RX ADMIN — SIMVASTATIN 10 MILLIGRAM(S): 20 TABLET, FILM COATED ORAL at 21:28

## 2017-05-17 RX ADMIN — Medication 100 MILLIGRAM(S): at 12:13

## 2017-05-17 RX ADMIN — PRAMIPEXOLE DIHYDROCHLORIDE 0.12 MILLIGRAM(S): 0.12 TABLET ORAL at 21:28

## 2017-05-17 RX ADMIN — PANTOPRAZOLE SODIUM 40 MILLIGRAM(S): 20 TABLET, DELAYED RELEASE ORAL at 17:34

## 2017-05-17 RX ADMIN — PRAMIPEXOLE DIHYDROCHLORIDE 0.12 MILLIGRAM(S): 0.12 TABLET ORAL at 06:27

## 2017-05-17 RX ADMIN — GABAPENTIN 600 MILLIGRAM(S): 400 CAPSULE ORAL at 21:28

## 2017-05-17 RX ADMIN — Medication 150 MILLIGRAM(S): at 06:28

## 2017-05-17 RX ADMIN — ALBUTEROL 2 PUFF(S): 90 AEROSOL, METERED ORAL at 09:37

## 2017-05-17 RX ADMIN — Medication 40 MILLIGRAM(S): at 06:29

## 2017-05-17 RX ADMIN — ISOSORBIDE MONONITRATE 120 MILLIGRAM(S): 60 TABLET, EXTENDED RELEASE ORAL at 14:27

## 2017-05-17 RX ADMIN — Medication 120 MILLIGRAM(S): at 06:27

## 2017-05-17 RX ADMIN — Medication 1 APPLICATION(S): at 06:28

## 2017-05-17 RX ADMIN — Medication 1 SPRAY(S): at 06:51

## 2017-05-17 RX ADMIN — GABAPENTIN 300 MILLIGRAM(S): 400 CAPSULE ORAL at 12:13

## 2017-05-17 RX ADMIN — PANTOPRAZOLE SODIUM 40 MILLIGRAM(S): 20 TABLET, DELAYED RELEASE ORAL at 06:29

## 2017-05-17 RX ADMIN — Medication 40 MILLIGRAM(S): at 17:32

## 2017-05-17 RX ADMIN — Medication 12.5 MILLIGRAM(S): at 17:33

## 2017-05-17 RX ADMIN — PRAMIPEXOLE DIHYDROCHLORIDE 0.12 MILLIGRAM(S): 0.12 TABLET ORAL at 14:25

## 2017-05-17 RX ADMIN — HYDROMORPHONE HYDROCHLORIDE 0.5 MILLIGRAM(S): 2 INJECTION INTRAMUSCULAR; INTRAVENOUS; SUBCUTANEOUS at 14:44

## 2017-05-17 RX ADMIN — Medication 0.5 MILLIGRAM(S): at 19:40

## 2017-05-17 RX ADMIN — ALBUTEROL 2 PUFF(S): 90 AEROSOL, METERED ORAL at 14:03

## 2017-05-17 RX ADMIN — Medication 0.5 MILLIGRAM(S): at 09:40

## 2017-05-17 RX ADMIN — Medication 1 TABLET(S): at 12:11

## 2017-05-17 RX ADMIN — Medication 8 MILLIGRAM(S): at 21:29

## 2017-05-17 RX ADMIN — Medication 1 APPLICATION(S): at 18:48

## 2017-05-17 NOTE — PROGRESS NOTE ADULT - SUBJECTIVE AND OBJECTIVE BOX
transferred to [facility]    stable overnight    no rest pain    MEDICATIONS  (STANDING):  buDESOnide   0.5 milliGRAM(s) Respule 0.5milliGRAM(s) Inhalation two times a day  doxazosin 8milliGRAM(s) Oral at bedtime  isosorbide   mononitrate ER Tablet (IMDUR) 120milliGRAM(s) Oral daily  diltiazem   CD 120milliGRAM(s) Oral daily  gabapentin 300milliGRAM(s) Oral daily  allopurinol 100milliGRAM(s) Oral daily  fenofibrate Tablet 145milliGRAM(s) Oral daily  simvastatin 10milliGRAM(s) Oral at bedtime  pramipexole 0.125milliGRAM(s) Oral three times a day  ALBUTerol    90 MICROgram(s) HFA Inhaler 2Puff(s) Inhalation every 6 hours  ammonium lactate 12% Lotion 1Application(s) Topical two times a day  fluticasone propionate 50 MICROgram(s)/spray Nasal Spray 1Spray(s) Both Nostrils two times a day  metoprolol 12.5milliGRAM(s) Oral two times a day  hydrALAZINE 150milliGRAM(s) Oral two times a day  lactobacillus acidophilus 1Tablet(s) Oral daily  gabapentin 600milliGRAM(s) Oral at bedtime  pantoprazole  Injectable 40milliGRAM(s) IV Push every 12 hours  furosemide   Injectable 40milliGRAM(s) IV Push daily  ALBUTerol/ipratropium for Nebulization. 3milliLiter(s) Nebulizer once    MEDICATIONS  (PRN):  traMADol 100milliGRAM(s) Oral every 6 hours PRN Moderate Pain (4 - 6)  aluminum hydroxide/magnesium hydroxide/simethicone Suspension 30milliLiter(s) Oral every 6 hours PRN Dyspepsia  oxyCODONE  5 mG/acetaminophen 325 mG 1Tablet(s) Oral every 4 hours PRN Severe Pain (7 - 10)  HYDROmorphone  Injectable 0.5milliGRAM(s) IV Push every 4 hours PRN Severe Pain (7 - 10)      Allergies    No Known Allergies    Intolerances        Flatus: [ ] YES [ ] NO             Bowel Movement: [ ] YES [ ] NO  Pain (0-10):            Pain Control Adequate: [ ] YES [ ] NO  Nausea: [ ] YES [ ] NO            Vomiting: [ ] YES [ ] NO  Diarrhea: [ ] YES [ ] NO         Constipation: [ ] YES [ ] NO     Chest Pain: [ ] YES [ ] NO    SOB:  [ ] YES [ ] NO    Vital Signs Last 24 Hrs  T(C): 36.3, Max: 37.5 (05-16 @ 17:00)  T(F): 97.4, Max: 99.5 (05-16 @ 17:00)  HR: 81 (73 - 85)  BP: 154/52 (140/47 - 154/52)  BP(mean): 73 (69 - 79)  RR: 18 (16 - 18)  SpO2: 97% (97% - 99%)    I&O's Summary    I & Os for current day (as of 17 May 2017 07:40)  =============================================  IN: 0 ml / OUT: 401 ml / NET: -401 ml      Physical Exam:  General: NAD, resting comfortably  Pulmonary: normal resp effort, CTA-B  Cardiovascular: NSR  Abdominal: soft, NT/ND  Extremities: WWP, normal strength  Neuro: A/O x 3, CNs II-XII grossly intact, normal motor/sensation, no focal deficits  Pulses:   Right:        R groin incision intact without drainage; R foot pink and warm, dry eschar on dorsum that will need to be debrided, not grossly infected                                                                              Left:  LABS:                        10.8   x     )-----------( x        ( 16 May 2017 07:48 )             34.6                 CAPILLARY BLOOD GLUCOSE      RADIOLOGY & ADDITIONAL TESTS:

## 2017-05-17 NOTE — DIETITIAN INITIAL EVALUATION ADULT. - PERTINENT LABORATORY DATA
5/17: Hgb/Hct: 10.8/35.6 (L), Na+: 150 (H), BUN: 77 (H), Creat: 1.39 (H), Glu: 106 (H), Ca+: 7.9 (L), GFR: 47 (L), 5/4/17: Albumin: 2.4 (L)

## 2017-05-17 NOTE — PROGRESS NOTE ADULT - SUBJECTIVE AND OBJECTIVE BOX
CC: Anemia, LE wounds.     HPI: 82 year old male with history of CAD s/p stents (LAD, RCA bare metal around 9/16), A.Fib not on anticoagulation due to GI bleeding, Hypertension, COPD on home O2 2L, SHAYY on nocturnal BIPAP, ex-smoker (smoked 1ppd X 50 years, quit 22 years ago),  Chronic diastolic CHF, Hyperlipidemia, PVD, Iron deficiency anemia, Chronic back pain, Gout, BPH, CKD III with baseline Cr 2, recently admitted to  in 4/17 with anemia of GI bleeding, RLE and RUE DVTs, received pRBCs and IVC filter discharged to rehab with aspirin was sent to  ER on 5/4/17 for worsening anemia with Hb 6, worsening leg pain from ulcers and worsening renal function Cr 3.99. In ED, + guaiac.     Hospital course: prolonged including stabilization of suspected GIB with RBC transfusion and PPI BID. EGD on hold given ongoing LE wounds and angioplasty this admission. Patient underwent RLE stent and angioplasty with Vascular and prolonged IV Cefepime for wounds. HD stable.     5/15/17: NAD and willing to sit up in chair. No CP or SOB. Better kidney function today. Will pull hoffman and attempt voiding trial.     5/16/17: Seen earlier with daughter at bedside. Pain overnight improved w/ small dose of IV Dilaudid. Hoffman pulled and voiding. No CP, stable dyspnea.     5/17/17: Seen with daughter at bedside. + LE pain rated 7 now, no CP or abd pain.     ROS: stated above.     Vital Signs Last 24 Hrs  T(C): 36.7, Max: 37.5 (05-16 @ 17:00)  T(F): 98, Max: 99.5 (05-16 @ 17:00)  HR: 77 (73 - 81)  BP: 139/53 (139/53 - 154/52)  BP(mean): --  RR: 18 (16 - 18)  SpO2: 99% (97% - 99%)    Physical exam:  Appears chronically ill and obese male in NAD, awake and alert. Smiling.   HEENT: PERRLA  Lungs- decrease air entry at bases, no active wheezing.   S1S2, normal rhthym  Abd- soft, NT, BS+, obese and mildly distended.   Ext- dressing both legs,. + 2 LE edema.  Black ischar to dorsal surface and some surrounding redness.   : + scrotal edema now voiding w/o hoffman  Neuro- AAOx3    LABS:                        10.8   4.9   )-----------( 300      ( 17 May 2017 07:47 )             35.6                           10.8   x     )-----------( x        ( 16 May 2017 07:48 )             34.6                         05-17    150<H>  |  119<H>  |  77<H>  ----------------------------<  106<H>  4.7   |  23  |  1.39<H>    Ca    7.9<L>      17 May 2017 07:47      05-15    148<H>  |  120<H>  |  80<H>  ----------------------------<  97  4.7   |  19<L>  |  1.49<H>    Ca    7.9<L>      15 May 2017 05:19      148<H>  |  120<H>  |  80<H>  ----------------------------<  97  4.7   |  19<L>  |  1.49<H>    Ca    7.9<L>      15 May 2017 05:19      5/14 CXR: Since May 12, 2017, unchanged small left pleural effusion with basilar   atelectasis.    MEDICATIONS  (STANDING):  buDESOnide   0.5 milliGRAM(s) Respule 0.5milliGRAM(s) Inhalation two times a day  doxazosin 8milliGRAM(s) Oral at bedtime  isosorbide   mononitrate ER Tablet (IMDUR) 120milliGRAM(s) Oral daily  diltiazem   CD 120milliGRAM(s) Oral daily  gabapentin 300milliGRAM(s) Oral daily  allopurinol 100milliGRAM(s) Oral daily  fenofibrate Tablet 145milliGRAM(s) Oral daily  simvastatin 10milliGRAM(s) Oral at bedtime  pramipexole 0.125milliGRAM(s) Oral three times a day  ALBUTerol    90 MICROgram(s) HFA Inhaler 2Puff(s) Inhalation every 6 hours  ammonium lactate 12% Lotion 1Application(s) Topical two times a day  fluticasone propionate 50 MICROgram(s)/spray Nasal Spray 1Spray(s) Both Nostrils two times a day  metoprolol 12.5milliGRAM(s) Oral two times a day  cefepime  IVPB 1000milliGRAM(s) IV Intermittent every 24 hours  hydrALAZINE 150milliGRAM(s) Oral two times a day  lactobacillus acidophilus 1Tablet(s) Oral daily  gabapentin 600milliGRAM(s) Oral at bedtime  pantoprazole  Injectable 40milliGRAM(s) IV Push every 12 hours  furosemide   Injectable 40milliGRAM(s) IV Push daily        Assessment/Plan:   This is an 82 year old male with history of CAD s/p stents (LAD, RCA bare metal around 9/16), A.Fib not on anticoagulation due to GI bleeding, Hypertension, COPD on home O2 2L,   Chronic diastolic CHF, Hyperlipidemia, PVD, CKD III with baseline Cr 2 admitted for anemia and GIB now stable with ongoing LE wounds s/p angioplasty with vascular surgery on abx:    #Severe PAD.   - 05/11 endarterectomy of CFA plaque in an attempt of limb preservation.  05/08 angiogram + angioplasy.  stenting of right common iliac + external iliac arteries.  statin.   Further plan as per DR Clement.  - vascular to decide on need for further intervention this week.   - cont PT, prn pain meds and vascular recs.       #chronic right LE stasis ulcers, dermatitis w/ cellulitis.    - S/p 2 weeks of IV Cefepime, completed on 5/16.     #GIB/acute blood loss anemia.  s/p 5 units PRBCs. No plans for EGD given ongoing resp issues on chronic O2.   Cont PPI BID per GI. Hb ~10. STABLE.   GI f/u appreciated  - daily CBC. To decide on starting antiplatelet therapy in the setting of LE stent.     #pulmonary vascular congestion and LE edema.   # Scrotal Edema  - voiding, hoffman out on 5/15.   - rales resolved on PE, on IV lasix daily. Repeat CXR and appreciate pulm recs.   - incentive spirometer post op.       #ARYA upon CKD.  Cr improved, back on Lasix --> switched to PO today.     Nephrology following.    #multiple DVT RUE/RLL.  s/p IVC Filter.    DVT ppx: no pharmacological ppx 2/2 GIB, no venodynes 2/2 LLE wounds. Will start mobilization as tolerated.     Dispo: remain inpatient on Med-surg, awaiting further vascular recs.     Total time 35 mins. Discussed with daughter at length today.

## 2017-05-17 NOTE — DIETITIAN INITIAL EVALUATION ADULT. - OTHER INFO
Patient has been at rehab, had a previous hospitalization, where he lost 11# over the past 6 weeks. Prior to rehab placement, patient was cooking for himself and restricting sodium in his diet

## 2017-05-17 NOTE — PROGRESS NOTE ADULT - ASSESSMENT
status post R leg/foot ischemia with improvement    will schedule for debridement within next several days

## 2017-05-17 NOTE — PROGRESS NOTE ADULT - NSHPATTENDINGPLANDISCUSS_GEN_ALL_CORE
pt
pt and staff.
Dr. Conner
Dr. Mcfadden
pt and daughter at bedside
Family and nurse

## 2017-05-17 NOTE — PROGRESS NOTE ADULT - ASSESSMENT
# ARYA due to pre-renal azothemia with CKD stage 4 at baseline ( 2.0-2.5)  # Anemia due to acute blood loss anemia  # Sepsis? due to LE ulcers  # Multiple DVT on RUE/RLE with s/p IVC fileter  # HTN    5/15 MK  - ARYA/CKD , near his baseline when he is on no diuretics.  Will restart lasix at 40 mg iv daily and moniter response  - PVD with cellulitis with stasis: now improving in appearance, no angio for now as per dr dugan.  abx as per dr rashid  - Anemia with acute blood loss, h/h stable  - HYpernatremia: increase his intake of free water, chronically this range    5/16 SY  --ARYA/CKD   improved and stable.  Tolerated angiogram with stent without acute event.  No further studies planned at this point.  Lasix resumed.  Follow fluid status closely  --Hypernatremia : expect improvement with diuretics.  --PVD /LE edema and cellulitis.  Post Angiogram and stent placement,  Abtx completed.  Continue wound care.    5/17 MK  - ARYA/CKD now with stable paramenters.  change to po lasix   - Hypernatremia: moniter now on po lasix  - PVD with stasis ulcers with celllutits: possible debridement

## 2017-05-17 NOTE — PROGRESS NOTE ADULT - SUBJECTIVE AND OBJECTIVE BOX
Patient is a 82y old  Male who presents with a chief complaint of Worsening leg pain, anemia, ARYA sent from Rehab (04 May 2017 15:56)      HPI:  82 year old male with history of CAD s/p stents (LAD, RCA bare metal around 9/16), A.Fib not on anticoagulation due to GI bleeding, Hypertension, COPD on home O2 2L, SHAYY on nocturnal BIPAP, ex-smoker (smoked 1ppd X 50 years, quit 22 years ago),  Chronic diastolic CHF, Hyperlipidemia, PVD, Iron deficiency anemia, Chronic back pain, Gout, BPH, CKD III with baseline Cr 2, recently admitted to  in 4/17 with anemia of GI bleeding, RLE and RUE DVTs, received pRBCs and IVC filter discharged to rehab with aspirin was sent to  ER on 5/4/17 for worsening anemia with Hb 6, worsening leg pain from ulcers and worsening renal function Cr 3.99.    Pt seen bed side.  Not well oriented currently after receiving Dilaudid for pain in ER.  As per daughters, pt was walking with a walker at rehab and was doing better.  Pt was found to have tarry black stools in ER, guaic positive. (04 May 2017 15:56)      pt comf  mild reflux after shivam flat  neg dysphagia  Hx per pt and D    PAST MEDICAL & SURGICAL HISTORY:  Sepsis, due to unspecified organism: 2/2 poorly healing wounds b/l  BPH (benign prostatic hypertrophy)  Hyperlipemia  Coronary artery disease  Hypertension  Dyspepsia: On moderate exertion.  Sleep apnea, obstructive: Requires home 02 therapy, and treatment with BIPAP  Atelectasis  Pleural effusion, bilateral  Respiratory failure  Peripheral edema  CRI (chronic renal insufficiency)  Gout  CRF (chronic renal failure)  Benign prostatic hypertrophy  Spinal stenosis  Hypercholesterolemia  GERD (gastroesophageal reflux disease)  CAD (coronary artery disease)  Hypertension  S/P angioplasty with stent  Cataract of left eye  Prostate: Surgery green light procedure.  S/P rotator cuff surgery: Right  S/P angioplasty  Rotator cuff tear, right: repair      MEDICATIONS  (STANDING):  buDESOnide   0.5 milliGRAM(s) Respule 0.5milliGRAM(s) Inhalation two times a day  doxazosin 8milliGRAM(s) Oral at bedtime  isosorbide   mononitrate ER Tablet (IMDUR) 120milliGRAM(s) Oral daily  diltiazem   CD 120milliGRAM(s) Oral daily  gabapentin 300milliGRAM(s) Oral daily  allopurinol 100milliGRAM(s) Oral daily  fenofibrate Tablet 145milliGRAM(s) Oral daily  simvastatin 10milliGRAM(s) Oral at bedtime  pramipexole 0.125milliGRAM(s) Oral three times a day  ALBUTerol    90 MICROgram(s) HFA Inhaler 2Puff(s) Inhalation every 6 hours  ammonium lactate 12% Lotion 1Application(s) Topical two times a day  fluticasone propionate 50 MICROgram(s)/spray Nasal Spray 1Spray(s) Both Nostrils two times a day  metoprolol 12.5milliGRAM(s) Oral two times a day  hydrALAZINE 150milliGRAM(s) Oral two times a day  lactobacillus acidophilus 1Tablet(s) Oral daily  gabapentin 600milliGRAM(s) Oral at bedtime  pantoprazole  Injectable 40milliGRAM(s) IV Push every 12 hours  furosemide   Injectable 40milliGRAM(s) IV Push daily  ALBUTerol/ipratropium for Nebulization. 3milliLiter(s) Nebulizer once    MEDICATIONS  (PRN):  traMADol 100milliGRAM(s) Oral every 6 hours PRN Moderate Pain (4 - 6)  aluminum hydroxide/magnesium hydroxide/simethicone Suspension 30milliLiter(s) Oral every 6 hours PRN Dyspepsia  oxyCODONE  5 mG/acetaminophen 325 mG 1Tablet(s) Oral every 4 hours PRN Severe Pain (7 - 10)  HYDROmorphone  Injectable 0.5milliGRAM(s) IV Push every 4 hours PRN Severe Pain (7 - 10)      Allergies    No Known Allergies    Intolerances        SOCIAL HISTORY:unchanged    FAMILY HISTORY:  No pertinent family history in first degree relatives      REVIEW OF SYSTEMS:    CONSTITUTIONAL: No weakness, fevers or chills  EYES/ENT: No visual changes;  No vertigo or throat pain   NECK: No pain or stiffness  RESPIRATORY: No cough, wheezing, hemoptysis; No shortness of breath  CARDIOVASCULAR: No chest pain or palpitations  GENITOURINARY: No dysuria, frequency or hematuria  NEUROLOGICAL: No numbness or weakness  SKIN: No itching, burning, rashes, or lesions   All other review of systems is negative unless indicated above.    Vital Signs Last 24 Hrs  T(C): 36.7, Max: 37.5 (05-16 @ 17:00)  T(F): 98, Max: 99.5 (05-16 @ 17:00)  HR: 75 (73 - 81)  BP: 139/53 (139/53 - 154/52)  BP(mean): 73 (73 - 73)  RR: 18 (16 - 18)  SpO2: 99% (97% - 99%)    PHYSICAL EXAM:    Constitutional: NAD, well-developed  HEENT: EOMI, throat clear  Neck: No LAD, supple  Respiratory: CTA and P  Cardiovascular: S1 and S2, RRR, no M  Gastrointestinal: BS+, soft, NT/ND, neg HSM,  Extremities: pos peripheral edema, neg clubing, cyanosis    Neurological: A/O x 3, no focal deficits  Psychiatric: Normal mood, normal affect  Skin: No rashes    LABS:  CBC Full  -  ( 17 May 2017 07:47 )  WBC Count : 4.9 K/uL  Hemoglobin : 10.8 g/dL  Hematocrit : 35.6 %  Platelet Count - Automated : 300 K/uL  Mean Cell Volume : 94.6 fl  Mean Cell Hemoglobin : 28.8 pg  Mean Cell Hemoglobin Concentration : 30.4 gm/dL  Auto Neutrophil # : x  Auto Lymphocyte # : x  Auto Monocyte # : x  Auto Eosinophil # : x  Auto Basophil # : x  Auto Neutrophil % : x  Auto Lymphocyte % : x  Auto Monocyte % : x  Auto Eosinophil % : x  Auto Basophil % : x    05-17    150<H>  |  119<H>  |  77<H>  ----------------------------<  106<H>  4.7   |  23  |  1.39<H>    Ca    7.9<L>      17 May 2017 07:47              RADIOLOGY & ADDITIONAL STUDIES:

## 2017-05-17 NOTE — PROGRESS NOTE ADULT - ASSESSMENT
anemia, likely multifactorial  I suspect that there is a component of GI bleeding, appears stabilized on PPI  and hct stable  but concerned with repeat melena.  this may be old  will check HCT and serial H H, daily    He has received multiple units of packed red blood cells     protonix po bid   Serial H&H'    lower extremity DVT, status post IVC filter    Sepsis likely secondary to lower extremity cellulitis, improving    Advanced COPD and obstructive sleep apnea reviewed.    DW  daughter

## 2017-05-17 NOTE — PROGRESS NOTE ADULT - SUBJECTIVE AND OBJECTIVE BOX
NEPHROLOGY INTERVAL HPI/OVERNIGHT EVENTS:  no c/o doing well.  improved in perfusion of rle.  no immediate plans for angio.  posssible debridement       MEDICATIONS  (STANDING):  buDESOnide   0.5 milliGRAM(s) Respule 0.5milliGRAM(s) Inhalation two times a day  doxazosin 8milliGRAM(s) Oral at bedtime  isosorbide   mononitrate ER Tablet (IMDUR) 120milliGRAM(s) Oral daily  diltiazem   CD 120milliGRAM(s) Oral daily  gabapentin 300milliGRAM(s) Oral daily  allopurinol 100milliGRAM(s) Oral daily  fenofibrate Tablet 145milliGRAM(s) Oral daily  simvastatin 10milliGRAM(s) Oral at bedtime  pramipexole 0.125milliGRAM(s) Oral three times a day  ALBUTerol    90 MICROgram(s) HFA Inhaler 2Puff(s) Inhalation every 6 hours  ammonium lactate 12% Lotion 1Application(s) Topical two times a day  fluticasone propionate 50 MICROgram(s)/spray Nasal Spray 1Spray(s) Both Nostrils two times a day  metoprolol 12.5milliGRAM(s) Oral two times a day  hydrALAZINE 150milliGRAM(s) Oral two times a day  lactobacillus acidophilus 1Tablet(s) Oral daily  gabapentin 600milliGRAM(s) Oral at bedtime  pantoprazole  Injectable 40milliGRAM(s) IV Push every 12 hours  furosemide   Injectable 40milliGRAM(s) IV Push daily  ALBUTerol/ipratropium for Nebulization. 3milliLiter(s) Nebulizer once    MEDICATIONS  (PRN):  traMADol 100milliGRAM(s) Oral every 6 hours PRN Moderate Pain (4 - 6)  aluminum hydroxide/magnesium hydroxide/simethicone Suspension 30milliLiter(s) Oral every 6 hours PRN Dyspepsia  oxyCODONE  5 mG/acetaminophen 325 mG 1Tablet(s) Oral every 4 hours PRN Severe Pain (7 - 10)  HYDROmorphone  Injectable 0.5milliGRAM(s) IV Push every 4 hours PRN Severe Pain (7 - 10)      Allergies    No Known Allergies    Intolerances        I&O's Detail    I & Os for current day (as of 17 May 2017 11:33)  =============================================  IN:    Total IN: 0 ml  ---------------------------------------------  OUT:    Voided: 401 ml    Total OUT: 401 ml  ---------------------------------------------  Total NET: -401 ml          Vital Signs Last 24 Hrs  T(C): 36.7, Max: 37.5 (05-16 @ 17:00)  T(F): 98, Max: 99.5 (05-16 @ 17:00)  HR: 75 (73 - 81)  BP: 139/53 (139/53 - 154/52)  BP(mean): 73 (73 - 73)  RR: 18 (16 - 18)  SpO2: 99% (97% - 99%)  Daily     Daily     PHYSICAL EXAM:  General: alert. awake Ox3  HEENT: MMM  CV: s1s2 rrr  LUNGS: B/L CTA  EXT: no edema    LABS:                        10.8   4.9   )-----------( 300      ( 17 May 2017 07:47 )             35.6     05-17    150<H>  |  119<H>  |  77<H>  ----------------------------<  106<H>  4.7   |  23  |  1.39<H>    Ca    7.9<L>      17 May 2017 07:47 NEPHROLOGY INTERVAL HPI/OVERNIGHT EVENTS:  no c/o doing well.  improved in perfusion of rle.  no immediate plans for angio.  posssible debridement       MEDICATIONS  (STANDING):  buDESOnide   0.5 milliGRAM(s) Respule 0.5milliGRAM(s) Inhalation two times a day  doxazosin 8milliGRAM(s) Oral at bedtime  isosorbide   mononitrate ER Tablet (IMDUR) 120milliGRAM(s) Oral daily  diltiazem   CD 120milliGRAM(s) Oral daily  gabapentin 300milliGRAM(s) Oral daily  allopurinol 100milliGRAM(s) Oral daily  fenofibrate Tablet 145milliGRAM(s) Oral daily  simvastatin 10milliGRAM(s) Oral at bedtime  pramipexole 0.125milliGRAM(s) Oral three times a day  ALBUTerol    90 MICROgram(s) HFA Inhaler 2Puff(s) Inhalation every 6 hours  ammonium lactate 12% Lotion 1Application(s) Topical two times a day  fluticasone propionate 50 MICROgram(s)/spray Nasal Spray 1Spray(s) Both Nostrils two times a day  metoprolol 12.5milliGRAM(s) Oral two times a day  hydrALAZINE 150milliGRAM(s) Oral two times a day  lactobacillus acidophilus 1Tablet(s) Oral daily  gabapentin 600milliGRAM(s) Oral at bedtime  pantoprazole  Injectable 40milliGRAM(s) IV Push every 12 hours  furosemide   Injectable 40milliGRAM(s) IV Push daily  ALBUTerol/ipratropium for Nebulization. 3milliLiter(s) Nebulizer once    MEDICATIONS  (PRN):  traMADol 100milliGRAM(s) Oral every 6 hours PRN Moderate Pain (4 - 6)  aluminum hydroxide/magnesium hydroxide/simethicone Suspension 30milliLiter(s) Oral every 6 hours PRN Dyspepsia  oxyCODONE  5 mG/acetaminophen 325 mG 1Tablet(s) Oral every 4 hours PRN Severe Pain (7 - 10)  HYDROmorphone  Injectable 0.5milliGRAM(s) IV Push every 4 hours PRN Severe Pain (7 - 10)      Allergies    No Known Allergies    Intolerances        I&O's Detail    I & Os for current day (as of 17 May 2017 11:33)  =============================================  IN:    Total IN: 0 ml  ---------------------------------------------  OUT:    Voided: 401 ml    Total OUT: 401 ml  ---------------------------------------------  Total NET: -401 ml          Vital Signs Last 24 Hrs  T(C): 36.7, Max: 37.5 (05-16 @ 17:00)  T(F): 98, Max: 99.5 (05-16 @ 17:00)  HR: 75 (73 - 81)  BP: 139/53 (139/53 - 154/52)  BP(mean): 73 (73 - 73)  RR: 18 (16 - 18)  SpO2: 99% (97% - 99%)  Daily     Daily     PHYSICAL EXAM:  General: alert. awake Ox3  HEENT: MMM  CV: s1s2 rrr  LUNGS: B/L CTA  EXT: edema improved with LE dressing in place    LABS:                        10.8   4.9   )-----------( 300      ( 17 May 2017 07:47 )             35.6     05-17    150<H>  |  119<H>  |  77<H>  ----------------------------<  106<H>  4.7   |  23  |  1.39<H>    Ca    7.9<L>      17 May 2017 07:47

## 2017-05-18 LAB
ANION GAP SERPL CALC-SCNC: 8 MMOL/L — SIGNIFICANT CHANGE UP (ref 5–17)
BUN SERPL-MCNC: 71 MG/DL — HIGH (ref 7–23)
CALCIUM SERPL-MCNC: 8 MG/DL — LOW (ref 8.5–10.1)
CHLORIDE SERPL-SCNC: 119 MMOL/L — HIGH (ref 96–108)
CO2 SERPL-SCNC: 22 MMOL/L — SIGNIFICANT CHANGE UP (ref 22–31)
CREAT SERPL-MCNC: 1.25 MG/DL — SIGNIFICANT CHANGE UP (ref 0.5–1.3)
GLUCOSE SERPL-MCNC: 98 MG/DL — SIGNIFICANT CHANGE UP (ref 70–99)
HCT VFR BLD CALC: 32.6 % — LOW (ref 39–50)
HGB BLD-MCNC: 10 G/DL — LOW (ref 13–17)
MCHC RBC-ENTMCNC: 29 PG — SIGNIFICANT CHANGE UP (ref 27–34)
MCHC RBC-ENTMCNC: 30.8 GM/DL — LOW (ref 32–36)
MCV RBC AUTO: 94.5 FL — SIGNIFICANT CHANGE UP (ref 80–100)
PLATELET # BLD AUTO: 306 K/UL — SIGNIFICANT CHANGE UP (ref 150–400)
POTASSIUM SERPL-MCNC: 4.6 MMOL/L — SIGNIFICANT CHANGE UP (ref 3.5–5.3)
POTASSIUM SERPL-SCNC: 4.6 MMOL/L — SIGNIFICANT CHANGE UP (ref 3.5–5.3)
RBC # BLD: 3.45 M/UL — LOW (ref 4.2–5.8)
RBC # FLD: 15.4 % — HIGH (ref 10.3–14.5)
SODIUM SERPL-SCNC: 149 MMOL/L — HIGH (ref 135–145)
WBC # BLD: 4.9 K/UL — SIGNIFICANT CHANGE UP (ref 3.8–10.5)
WBC # FLD AUTO: 4.9 K/UL — SIGNIFICANT CHANGE UP (ref 3.8–10.5)

## 2017-05-18 PROCEDURE — 71010: CPT | Mod: 26

## 2017-05-18 RX ORDER — DOCUSATE SODIUM 100 MG
100 CAPSULE ORAL
Qty: 0 | Refills: 0 | Status: DISCONTINUED | OUTPATIENT
Start: 2017-05-18 | End: 2017-05-24

## 2017-05-18 RX ORDER — PHENOL/SODIUM PHENOLATE
2 AEROSOL, SPRAY (ML) MUCOUS MEMBRANE THREE TIMES A DAY
Qty: 0 | Refills: 0 | Status: DISCONTINUED | OUTPATIENT
Start: 2017-05-18 | End: 2017-05-24

## 2017-05-18 RX ORDER — SENNA PLUS 8.6 MG/1
2 TABLET ORAL AT BEDTIME
Qty: 0 | Refills: 0 | Status: DISCONTINUED | OUTPATIENT
Start: 2017-05-18 | End: 2017-05-24

## 2017-05-18 RX ADMIN — Medication 12.5 MILLIGRAM(S): at 18:24

## 2017-05-18 RX ADMIN — SENNA PLUS 2 TABLET(S): 8.6 TABLET ORAL at 21:43

## 2017-05-18 RX ADMIN — SIMVASTATIN 10 MILLIGRAM(S): 20 TABLET, FILM COATED ORAL at 21:43

## 2017-05-18 RX ADMIN — Medication 8 MILLIGRAM(S): at 21:42

## 2017-05-18 RX ADMIN — Medication 3 MILLILITER(S): at 16:22

## 2017-05-18 RX ADMIN — ALBUTEROL 2 PUFF(S): 90 AEROSOL, METERED ORAL at 08:21

## 2017-05-18 RX ADMIN — ISOSORBIDE MONONITRATE 120 MILLIGRAM(S): 60 TABLET, EXTENDED RELEASE ORAL at 12:58

## 2017-05-18 RX ADMIN — PANTOPRAZOLE SODIUM 40 MILLIGRAM(S): 20 TABLET, DELAYED RELEASE ORAL at 06:41

## 2017-05-18 RX ADMIN — Medication 150 MILLIGRAM(S): at 06:41

## 2017-05-18 RX ADMIN — Medication 1 SPRAY(S): at 18:23

## 2017-05-18 RX ADMIN — PRAMIPEXOLE DIHYDROCHLORIDE 0.12 MILLIGRAM(S): 0.12 TABLET ORAL at 21:53

## 2017-05-18 RX ADMIN — Medication 1 SPRAY(S): at 06:41

## 2017-05-18 RX ADMIN — Medication 1 TABLET(S): at 12:57

## 2017-05-18 RX ADMIN — Medication 12.5 MILLIGRAM(S): at 06:41

## 2017-05-18 RX ADMIN — Medication 2 SPRAY(S): at 20:15

## 2017-05-18 RX ADMIN — PRAMIPEXOLE DIHYDROCHLORIDE 0.12 MILLIGRAM(S): 0.12 TABLET ORAL at 16:50

## 2017-05-18 RX ADMIN — PANTOPRAZOLE SODIUM 40 MILLIGRAM(S): 20 TABLET, DELAYED RELEASE ORAL at 18:24

## 2017-05-18 RX ADMIN — PRAMIPEXOLE DIHYDROCHLORIDE 0.12 MILLIGRAM(S): 0.12 TABLET ORAL at 08:56

## 2017-05-18 RX ADMIN — GABAPENTIN 600 MILLIGRAM(S): 400 CAPSULE ORAL at 21:44

## 2017-05-18 RX ADMIN — Medication 0.5 MILLIGRAM(S): at 08:19

## 2017-05-18 RX ADMIN — Medication 0.5 MILLIGRAM(S): at 19:23

## 2017-05-18 RX ADMIN — Medication 2 SPRAY(S): at 16:49

## 2017-05-18 RX ADMIN — Medication 150 MILLIGRAM(S): at 18:24

## 2017-05-18 RX ADMIN — Medication 120 MILLIGRAM(S): at 06:41

## 2017-05-18 RX ADMIN — ALBUTEROL 2 PUFF(S): 90 AEROSOL, METERED ORAL at 19:33

## 2017-05-18 RX ADMIN — Medication 1 APPLICATION(S): at 06:59

## 2017-05-18 RX ADMIN — Medication 100 MILLIGRAM(S): at 12:58

## 2017-05-18 RX ADMIN — Medication 145 MILLIGRAM(S): at 12:58

## 2017-05-18 RX ADMIN — Medication 40 MILLIGRAM(S): at 06:41

## 2017-05-18 RX ADMIN — GABAPENTIN 300 MILLIGRAM(S): 400 CAPSULE ORAL at 12:58

## 2017-05-18 RX ADMIN — ALBUTEROL 2 PUFF(S): 90 AEROSOL, METERED ORAL at 13:49

## 2017-05-18 NOTE — PROGRESS NOTE ADULT - SUBJECTIVE AND OBJECTIVE BOX
Patient is a 82y old  Male who presents with a chief complaint of Worsening leg pain, anemia, ARYA sent from Rehab (04 May 2017 15:56)      HPI:  82 year old male with history of CAD s/p stents (LAD, RCA bare metal around 9/16), A.Fib not on anticoagulation due to GI bleeding, Hypertension, COPD on home O2 2L, SHAYY on nocturnal BIPAP, ex-smoker (smoked 1ppd X 50 years, quit 22 years ago),  Chronic diastolic CHF, Hyperlipidemia, PVD, Iron deficiency anemia, Chronic back pain, Gout, BPH, CKD III with baseline Cr 2, recently admitted to  in 4/17 with anemia of GI bleeding, RLE and RUE DVTs, received pRBCs and IVC filter discharged to rehab with aspirin was sent to  ER on 5/4/17 for worsening anemia with Hb 6, worsening leg pain from ulcers and worsening renal function Cr 3.99.    Pt seen bed side.  Not well oriented currently after receiving Dilaudid for pain in ER.  As per daughters, pt was walking with a walker at rehab and was doing better.  Pt was found to have tarry black stools in ER, guaic positive. (04 May 2017 15:56)    data rev with pt's RN and DTR at bedside today  tolerated his vascular surgery well , extubated and is having bkfst in bed this am  no cp  no sig cough or wheeze  uses his biapp at night    5/13  needed diuresis yesterday  feels better today  DTr at bedside  uses incentive spirometry  no cp  5/16  pt's dtr at bedside  feels ok  no breathing issues  Dr Clement's eval in progress and may need repeat OR  5/18  seen and eval on 5 [place]  breathing is ok  data rev with Dr Clement as well this am  pt may need debridement surgery in 1-2 days  no active pulm sxs now  PAST MEDICAL & SURGICAL HISTORY:  Sepsis, due to unspecified organism: 2/2 poorly healing wounds b/l  BPH (benign prostatic hypertrophy)  Hyperlipemia  Coronary artery disease  Hypertension  Dyspepsia: On moderate exertion.  Sleep apnea, obstructive: Requires home 02 therapy, and treatment with BIPAP  Atelectasis  Pleural effusion, bilateral  Respiratory failure  Peripheral edema  CRI (chronic renal insufficiency)  Gout  CRF (chronic renal failure)  Benign prostatic hypertrophy  Spinal stenosis  Hypercholesterolemia  GERD (gastroesophageal reflux disease)  CAD (coronary artery disease)  Hypertension  S/P angioplasty with stent  Cataract of left eye  Prostate: Surgery green light procedure.  S/P rotator cuff surgery: Right  S/P angioplasty  Rotator cuff tear, right: repair      PREVIOUS DIAGNOSTIC TESTING:      MEDICATIONS  (STANDING):  buDESOnide   0.5 milliGRAM(s) Respule 0.5milliGRAM(s) Inhalation two times a day  doxazosin 8milliGRAM(s) Oral at bedtime  isosorbide   mononitrate ER Tablet (IMDUR) 120milliGRAM(s) Oral daily  diltiazem   CD 120milliGRAM(s) Oral daily  gabapentin 300milliGRAM(s) Oral daily  allopurinol 100milliGRAM(s) Oral daily  fenofibrate Tablet 145milliGRAM(s) Oral daily  simvastatin 10milliGRAM(s) Oral at bedtime  pramipexole 0.125milliGRAM(s) Oral three times a day  ALBUTerol    90 MICROgram(s) HFA Inhaler 2Puff(s) Inhalation every 6 hours  ammonium lactate 12% Lotion 1Application(s) Topical two times a day  fluticasone propionate 50 MICROgram(s)/spray Nasal Spray 1Spray(s) Both Nostrils two times a day  metoprolol 12.5milliGRAM(s) Oral two times a day  hydrALAZINE 150milliGRAM(s) Oral two times a day  lactobacillus acidophilus 1Tablet(s) Oral daily  gabapentin 600milliGRAM(s) Oral at bedtime  pantoprazole  Injectable 40milliGRAM(s) IV Push every 12 hours  ALBUTerol/ipratropium for Nebulization. 3milliLiter(s) Nebulizer once  furosemide    Tablet 40milliGRAM(s) Oral daily    MEDICATIONS  (PRN):  traMADol 100milliGRAM(s) Oral every 6 hours PRN Moderate Pain (4 - 6)  aluminum hydroxide/magnesium hydroxide/simethicone Suspension 30milliLiter(s) Oral every 6 hours PRN Dyspepsia  oxyCODONE  5 mG/acetaminophen 325 mG 1Tablet(s) Oral every 4 hours PRN Severe Pain (7 - 10)  HYDROmorphone  Injectable 0.5milliGRAM(s) IV Push every 4 hours PRN Severe Pain (7 - 10)      FAMILY HISTORY:  No pertinent family history in first degree relatives      REVIEW OF SYSTEM:  Pertinent items are noted in HPI.  Constitutional negative for chills, fevers, sweats and weight loss  throat, and face:  negative for epistaxis, nasal congestion, sore throat and   tinnitus  Respiratory:pos dyspnea on exertion, pleuritic chest pain  and wheezing  Cardiovascular:  negative for chest pain, dyspnea and palpitations  Gastrointestinal: negative for abdominal pain, diarrhea, nausea and vomiting  Genitourinary: negative for dysuria, frequency and urinary incontinence  Skin:  negative for redness, rash, pruritus, swelling, dryness and   fissures  Hematologic/lymphatic: negative for bleeding and easy bruising  Musculoskeletal: posfor arthralgias, back pain and muscle weakness  Neurological: negative for dizziness, headaches, seizures and tremors  Behavioral/Psych:  negative for mood change, depression, suicidal attempts    Vital Signs Last 24 Hrs  T(C): 36.9, Max: 36.9 (05-18 @ 05:02)  T(F): 98.4, Max: 98.4 (05-18 @ 05:02)  HR: 80 (74 - 80)  BP: 161/44 (139/53 - 161/44)  BP(mean): --  RR: 18 (18 - 18)  SpO2: 98% (98% - 99%)    I&O's Summary    I & Os for current day (as of 12 May 2017 08:38)  =============================================  IN: 3863.8 ml / OUT: 1260 ml / NET: 2603.8 ml    PHYSICAL EXAM  General Appearance: cooperative, no acute distress,   HEENT: PERRL, conjunctiva clear, EOM's intact,   Neck: Supple, , no adenopathy, thyroid: not enlarged, no carotid bruit or JVD  Back: Symmetric, no  tenderness,no soft tissue tenderness  Lungs: Clear to auscultation bilateral,no adventitious breath sounds, normal   expiratory phase, few bibasilar rales  Heart: Regular rate and rhythm, S1, S2 normal, no murmur, rub or gallop  Abdomen: Soft, non-tender, bowel sounds active , no hepatosplenomegaly  Extremities:cyanotic right toes, chrnic peripheral/ distal ulcerations/ peripheral edema  Skin: Skin color, texture normal, no rashes   Neurologic: Alert and oriented X3 , cranial nerves intact, sensory and motor normal,    ECG:                            10.8   4.9   )-----------( 300      ( 17 May 2017 07:47 )             35.6       05-17    150<H>  |  119<H>  |  77<H>  ----------------------------<  106<H>  4.7   |  23  |  1.39<H>    Ca    7.9<L>      17 May 2017 07:47      LABS:                        10.2   6.4   )-----------( 302      ( 15 May 2017 05:19 )             33.5   05-15    148<H>  |  120<H>  |  80<H>  ----------------------------<  97  4.7   |  19<L>  |  1.49<H>    Ca    7.9<L>      15 May 2017 05:19                            10.7   8.3   )-----------( 244      ( 12 May 2017 06:42 )             34.0     05-12    145  |  118<H>  |  95<H>  ----------------------------<  124<H>  5.4<H>   |  18<L>  |  2.09<H>    Ca    7.8<L>      12 May 2017 05:33      CARDIAC MARKERS ( 12 May 2017 05:33 )  0.026 ng/mL / x     / x     / x     / x      CARDIAC MARKERS ( 11 May 2017 12:55 )  0.033 ng/mL / x     / x     / x     / x              PROCEDURE DATE:  05/04/2017        INTERPRETATION:  History: GI bleed admission    Chest:  one view.      Comparison: 3/31/2017    AP radiograph of the chest demonstrates no evidence of infiltrate,   pleural effusion or vascular congestion. No atelectasis is seen. The   cardiac silhouette is normal in size. Osseous structures are intact.    Impression: No active pulmonary disease.    Contrast:     Oral contrast only    Comparison: CT abdomen and pelvis 6/13/2016    Findings:  LIVER: Normal.  SPLEEN: Normal.  PANCREAS: Normal.  GALLBLADDER/BILIARY TREE: Nondilated. Gallstone is present.  ADRENALS: Normal.  KIDNEYS: No calcification, hydronephrosis, or soft tissue attenuating   renal mass.  LYMPHADENOPATHY/RETROPERITONEUM: No adenopathy.  VASCULATURE: Extensive aortoiliac vascular calcification without   aneurysm. Infrarenal vena cava filter in place.  BOWEL: No bowel related abnormality. Specifically, no bowel obstruction.  PELVIC VISCERA: Calcified BPH in the prostate is noted.  PELVIC LYMPH NODES: No pelvic adenopathy.  PERITONEUM/ABDOMINAL WALL: No free air orascites.  SKELETAL: No acute bony abnormality.  LUNG BASES: Clear.    IMPRESSION:     Preponderance of abdominal visceral adipose tissue without evidence for   obstruction, mass, or ascites.          RADIOLOGY & ADDITIONAL STUDIES:  cxr noted and results discussed with pt and dtr today  5/12  mild PVM and small left sided effusion    PROCEDURE DATE:  05/12/2017        INTERPRETATION:  CHEST SINGLE VIEW FRONTAL    Single AP view    HISTORY:  preop    Comparison:  Chest x-ray 5/4/2017    The cardiac silhouette is within normal limits. Mild pulmonary vascular   congestion and small left effusion.    IMPRESSION: Mild pulmonary vascular congestion and small left pleural   effusion has developed.      PROCEDURE DATE:  05/14/2017        INTERPRETATION:  EXAMINATION DATE: May 14, 2017 at 0902 hours.  CLINICAL INFORMATION: CHF.    TECHNIQUE: Frontal view of the chest   COMPARISON: May 12, 2017.    FINDINGS:    LUNGS/PLEURA: Small left pleural effusion with basilar atelectasis,   unchanged. No pneumothorax.  MEDIASTINUM: Cardiac silhouette is enlarged.  OTHER: None.    IMPRESSION:     Since May 12, 2017, unchanged small left pleural effusion with basilar   atelectasis.    PROCEDURE DATE:  05/14/2017        INTERPRETATION:  EXAMINATION DATE: May 14, 2017 at 0902 hours.  CLINICAL INFORMATION: CHF.    TECHNIQUE: Frontal view of the chest   COMPARISON: May 12, 2017.    FINDINGS:    LUNGS/PLEURA: Small left pleural effusion with basilar atelectasis,   unchanged. No pneumothorax.  MEDIASTINUM: Cardiac silhouette is enlarged.  OTHER: None.    IMPRESSION:     Since May 12, 2017, unchanged small left pleural effusion with basilar   atelectasis.

## 2017-05-18 NOTE — PROGRESS NOTE ADULT - SUBJECTIVE AND OBJECTIVE BOX
Patient is a 82y old  Male who presents with a chief complaint of Worsening leg pain, anemia, ARYA sent from Rehab (04 May 2017 15:56)      HPI:  82 year old male with history of CAD s/p stents (LAD, RCA bare metal around 9/16), A.Fib not on anticoagulation due to GI bleeding, Hypertension, COPD on home O2 2L, SHAYY on nocturnal BIPAP, ex-smoker (smoked 1ppd X 50 years, quit 22 years ago),  Chronic diastolic CHF, Hyperlipidemia, PVD, Iron deficiency anemia, Chronic back pain, Gout, BPH, CKD III with baseline Cr 2, recently admitted to  in 4/17 with anemia of GI bleeding, RLE and RUE DVTs, received pRBCs and IVC filter discharged to rehab with aspirin was sent to  ER on 5/4/17 for worsening anemia with Hb 6, worsening leg pain from ulcers and worsening renal function Cr 3.99.    Pt seen bed side.  Not well oriented currently after receiving Dilaudid for pain in ER.  As per daughters, pt was walking with a walker at rehab and was doing better.  Pt was found to have tarry black stools in ER, guaic positive. (04 May 2017 15:56)    pt with mild reflux and inc with laying down, suggest keeping HOB elevated  neg cp or sob    pos RLE discomfort        PAST MEDICAL & SURGICAL HISTORY:  Sepsis, due to unspecified organism: 2/2 poorly healing wounds b/l  BPH (benign prostatic hypertrophy)  Hyperlipemia  Coronary artery disease  Hypertension  Dyspepsia: On moderate exertion.  Sleep apnea, obstructive: Requires home 02 therapy, and treatment with BIPAP  Atelectasis  Pleural effusion, bilateral  Respiratory failure  Peripheral edema  CRI (chronic renal insufficiency)  Gout  CRF (chronic renal failure)  Benign prostatic hypertrophy  Spinal stenosis  Hypercholesterolemia  GERD (gastroesophageal reflux disease)  CAD (coronary artery disease)  Hypertension  S/P angioplasty with stent  Cataract of left eye  Prostate: Surgery green light procedure.  S/P rotator cuff surgery: Right  S/P angioplasty  Rotator cuff tear, right: repair      MEDICATIONS  (STANDING):  buDESOnide   0.5 milliGRAM(s) Respule 0.5milliGRAM(s) Inhalation two times a day  doxazosin 8milliGRAM(s) Oral at bedtime  isosorbide   mononitrate ER Tablet (IMDUR) 120milliGRAM(s) Oral daily  diltiazem   CD 120milliGRAM(s) Oral daily  gabapentin 300milliGRAM(s) Oral daily  allopurinol 100milliGRAM(s) Oral daily  fenofibrate Tablet 145milliGRAM(s) Oral daily  simvastatin 10milliGRAM(s) Oral at bedtime  pramipexole 0.125milliGRAM(s) Oral three times a day  ALBUTerol    90 MICROgram(s) HFA Inhaler 2Puff(s) Inhalation every 6 hours  ammonium lactate 12% Lotion 1Application(s) Topical two times a day  fluticasone propionate 50 MICROgram(s)/spray Nasal Spray 1Spray(s) Both Nostrils two times a day  metoprolol 12.5milliGRAM(s) Oral two times a day  hydrALAZINE 150milliGRAM(s) Oral two times a day  lactobacillus acidophilus 1Tablet(s) Oral daily  gabapentin 600milliGRAM(s) Oral at bedtime  pantoprazole  Injectable 40milliGRAM(s) IV Push every 12 hours  ALBUTerol/ipratropium for Nebulization. 3milliLiter(s) Nebulizer once  furosemide    Tablet 40milliGRAM(s) Oral daily    MEDICATIONS  (PRN):  traMADol 100milliGRAM(s) Oral every 6 hours PRN Moderate Pain (4 - 6)  aluminum hydroxide/magnesium hydroxide/simethicone Suspension 30milliLiter(s) Oral every 6 hours PRN Dyspepsia  oxyCODONE  5 mG/acetaminophen 325 mG 1Tablet(s) Oral every 4 hours PRN Severe Pain (7 - 10)  HYDROmorphone  Injectable 0.5milliGRAM(s) IV Push every 4 hours PRN Severe Pain (7 - 10)      Allergies    No Known Allergies    Intolerances        SOCIAL HISTORY:unchanged    FAMILY HISTORY:  No pertinent family history in first degree relatives      REVIEW OF SYSTEMS:    CONSTITUTIONAL: No weakness, fevers or chills  EYES/ENT: No visual changes;  No vertigo or throat pain   NECK: No pain or stiffness  RESPIRATORY: No cough, wheezing, hemoptysis; No shortness of breath  CARDIOVASCULAR: No chest pain or palpitations  GENITOURINARY: No dysuria, frequency or hematuria  NEUROLOGICAL: No numbness or weakness  SKIN: No itching, burning, rashes, or lesions   All other review of systems is negative unless indicated above.    Vital Signs Last 24 Hrs  T(C): 36.9, Max: 36.9 (05-18 @ 05:02)  T(F): 98.4, Max: 98.4 (05-18 @ 05:02)  HR: 80 (74 - 80)  BP: 161/44 (139/53 - 161/44)  BP(mean): --  RR: 18 (18 - 18)  SpO2: 98% (98% - 99%)    PHYSICAL EXAM:    Constitutional: NAD, well-developed  HEENT: EOMI, throat clear  Neck: No LAD, supple  Respiratory: CTA and P  Cardiovascular: S1 and S2, RRR, no M  Gastrointestinal: BS+, soft, NT/ND, neg HSM,  Extremities: pos peripheral edema, neg clubing, cyanosis, ulcers on RLE    Neurological: A/O x 3, no focal deficits  Psychiatric: Normal mood, normal affect  Skin: No rashes    LABS:  CBC Full  -  ( 17 May 2017 07:47 )  WBC Count : 4.9 K/uL  Hemoglobin : 10.8 g/dL  Hematocrit : 35.6 %  Platelet Count - Automated : 300 K/uL  Mean Cell Volume : 94.6 fl  Mean Cell Hemoglobin : 28.8 pg  Mean Cell Hemoglobin Concentration : 30.4 gm/dL  Auto Neutrophil # : x  Auto Lymphocyte # : x  Auto Monocyte # : x  Auto Eosinophil # : x  Auto Basophil # : x  Auto Neutrophil % : x  Auto Lymphocyte % : x  Auto Monocyte % : x  Auto Eosinophil % : x  Auto Basophil % : x    05-17    150<H>  |  119<H>  |  77<H>  ----------------------------<  106<H>  4.7   |  23  |  1.39<H>    Ca    7.9<L>      17 May 2017 07:47              RADIOLOGY & ADDITIONAL STUDIES:

## 2017-05-18 NOTE — PROGRESS NOTE ADULT - ASSESSMENT
anemia, likely multifactorial  I suspect that there is a component of GI bleeding, appears stabilized on PPI  and hct stable  but concerned with repeat melena.  this may be old  will check HCT and serial H H, daily    He has received multiple units of packed red blood cells     protonix po bid   Serial H&H'    lower extremity DVT, status post IVC filter    Sepsis likely secondary to lower extremity cellulitis, improved    Advanced COPD and obstructive sleep apnea reviewed.    DW  Dr Henry, inc risk bleed withanti plt

## 2017-05-18 NOTE — PROGRESS NOTE ADULT - SUBJECTIVE AND OBJECTIVE BOX
comfortable, sleeping through night    MEDICATIONS  (STANDING):  buDESOnide   0.5 milliGRAM(s) Respule 0.5milliGRAM(s) Inhalation two times a day  doxazosin 8milliGRAM(s) Oral at bedtime  isosorbide   mononitrate ER Tablet (IMDUR) 120milliGRAM(s) Oral daily  diltiazem   CD 120milliGRAM(s) Oral daily  gabapentin 300milliGRAM(s) Oral daily  allopurinol 100milliGRAM(s) Oral daily  fenofibrate Tablet 145milliGRAM(s) Oral daily  simvastatin 10milliGRAM(s) Oral at bedtime  pramipexole 0.125milliGRAM(s) Oral three times a day  ALBUTerol    90 MICROgram(s) HFA Inhaler 2Puff(s) Inhalation every 6 hours  ammonium lactate 12% Lotion 1Application(s) Topical two times a day  fluticasone propionate 50 MICROgram(s)/spray Nasal Spray 1Spray(s) Both Nostrils two times a day  metoprolol 12.5milliGRAM(s) Oral two times a day  hydrALAZINE 150milliGRAM(s) Oral two times a day  lactobacillus acidophilus 1Tablet(s) Oral daily  gabapentin 600milliGRAM(s) Oral at bedtime  pantoprazole  Injectable 40milliGRAM(s) IV Push every 12 hours  ALBUTerol/ipratropium for Nebulization. 3milliLiter(s) Nebulizer once  furosemide    Tablet 40milliGRAM(s) Oral daily    MEDICATIONS  (PRN):  traMADol 100milliGRAM(s) Oral every 6 hours PRN Moderate Pain (4 - 6)  aluminum hydroxide/magnesium hydroxide/simethicone Suspension 30milliLiter(s) Oral every 6 hours PRN Dyspepsia  oxyCODONE  5 mG/acetaminophen 325 mG 1Tablet(s) Oral every 4 hours PRN Severe Pain (7 - 10)  HYDROmorphone  Injectable 0.5milliGRAM(s) IV Push every 4 hours PRN Severe Pain (7 - 10)      Allergies    No Known Allergies    Intolerances        Flatus: [ ] YES [ ] NO             Bowel Movement: [ ] YES [ ] NO  Pain (0-10):            Pain Control Adequate: [ ] YES [ ] NO  Nausea: [ ] YES [ ] NO            Vomiting: [ ] YES [ ] NO  Diarrhea: [ ] YES [ ] NO         Constipation: [ ] YES [ ] NO     Chest Pain: [ ] YES [ ] NO    SOB:  [ ] YES [ ] NO    Vital Signs Last 24 Hrs  T(C): 36.9, Max: 36.9 (05-18 @ 05:02)  T(F): 98.4, Max: 98.4 (05-18 @ 05:02)  HR: 80 (74 - 80)  BP: 161/44 (139/53 - 161/44)  BP(mean): --  RR: 18 (18 - 18)  SpO2: 98% (98% - 99%)    I&O's Summary    I & Os for current day (as of 18 May 2017 07:15)  =============================================  IN: 0 ml / OUT: 200 ml / NET: -200 ml      Physical Exam:  General: NAD, resting comfortably  Pulmonary: normal resp effort, CTA-B  Cardiovascular: NSR  Abdominal: soft, NT/ND  Extremities: WWP, normal strength  Neuro: A/O x 3, CNs II-XII grossly intact, normal motor/sensation, no focal deficits  Pulses:   Right:                                                                          Left:  foot remains pink and warm with eschar on dorsum    LABS:                        10.8   4.9   )-----------( 300      ( 17 May 2017 07:47 )             35.6     05-17    150<H>  |  119<H>  |  77<H>  ----------------------------<  106<H>  4.7   |  23  |  1.39<H>    Ca    7.9<L>      17 May 2017 07:47            CAPILLARY BLOOD GLUCOSE      RADIOLOGY & ADDITIONAL TESTS:

## 2017-05-18 NOTE — PROGRESS NOTE ADULT - SUBJECTIVE AND OBJECTIVE BOX
CC: Anemia, LE wounds.     HPI: 82 year old male with history of CAD s/p stents (LAD, RCA bare metal around 9/16), A.Fib not on anticoagulation due to GI bleeding, Hypertension, COPD on home O2 2L, SHAYY on nocturnal BIPAP, ex-smoker (smoked 1ppd X 50 years, quit 22 years ago),  Chronic diastolic CHF, Hyperlipidemia, PVD, Iron deficiency anemia, Chronic back pain, Gout, BPH, CKD III with baseline Cr 2, recently admitted to  in 4/17 with anemia of GI bleeding, RLE and RUE DVTs, received pRBCs and IVC filter discharged to rehab with aspirin was sent to  ER on 5/4/17 for worsening anemia with Hb 6, worsening leg pain from ulcers and worsening renal function Cr 3.99. In ED, + guaiac.     Hospital course: prolonged including stabilization of suspected GIB with RBC transfusion and PPI BID. EGD on hold given ongoing LE wounds and angioplasty this admission. Patient underwent RLE stent and angioplasty with Vascular and prolonged IV Cefepime for wounds. HD stable.     5/15/17: NAD and willing to sit up in chair. No CP or SOB. Better kidney function today. Will pull hoffman and attempt voiding trial.     5/16/17: Seen earlier with daughter at bedside. Pain overnight improved w/ small dose of IV Dilaudid. Hoffman pulled and voiding. No CP, stable dyspnea.     5/17/17: Seen with daughter at bedside. + LE pain rated 7 now, no CP or abd pain.     5/18/17: Feels fine, denies leg pain currently. No CP. Voiding, tongue hurts --no visible lesions but requests numbing spray.     ROS: stated above.     Vital Signs Last 24 Hrs  T(C): 37.1, Max: 37.1 (05-18 @ 11:07)  T(F): 98.8, Max: 98.8 (05-18 @ 11:07)  HR: 79 (68 - 80)  BP: 149/45 (142/43 - 161/44)  BP(mean): --  RR: 16 (16 - 18)  SpO2: 97% (97% - 98%)    Physical exam:  Appears chronically ill and obese male in NAD, awake and alert.   HEENT: PERRLA  Lungs- decrease air entry at bases, no active wheezing.   S1S2, normal rhthym  Abd- soft, NT, BS+, obese and mildly distended.   Ext- dressing both legs,. + 2 LE edema.  Black ischar to dorsal surface and some surrounding redness.   : + scrotal edema now voiding w/o hoffman  Neuro- AAOx3    LABS:                        10.0   4.9   )-----------( 306      ( 18 May 2017 07:07 )             32.6                           10.8   4.9   )-----------( 300      ( 17 May 2017 07:47 )             35.6                           10.8   x     )-----------( x        ( 16 May 2017 07:48 )             34.6   05-18    149<H>  |  119<H>  |  71<H>  ----------------------------<  98  4.6   |  22  |  1.25    Ca    8.0<L>      18 May 2017 07:07      5/14 CXR: Since May 12, 2017, unchanged small left pleural effusion with basilar   atelectasis.    MEDICATIONS  (STANDING):  buDESOnide   0.5 milliGRAM(s) Respule 0.5milliGRAM(s) Inhalation two times a day  doxazosin 8milliGRAM(s) Oral at bedtime  isosorbide   mononitrate ER Tablet (IMDUR) 120milliGRAM(s) Oral daily  diltiazem   CD 120milliGRAM(s) Oral daily  gabapentin 300milliGRAM(s) Oral daily  allopurinol 100milliGRAM(s) Oral daily  fenofibrate Tablet 145milliGRAM(s) Oral daily  simvastatin 10milliGRAM(s) Oral at bedtime  pramipexole 0.125milliGRAM(s) Oral three times a day  ALBUTerol    90 MICROgram(s) HFA Inhaler 2Puff(s) Inhalation every 6 hours  ammonium lactate 12% Lotion 1Application(s) Topical two times a day  fluticasone propionate 50 MICROgram(s)/spray Nasal Spray 1Spray(s) Both Nostrils two times a day  metoprolol 12.5milliGRAM(s) Oral two times a day  cefepime  IVPB 1000milliGRAM(s) IV Intermittent every 24 hours  hydrALAZINE 150milliGRAM(s) Oral two times a day  lactobacillus acidophilus 1Tablet(s) Oral daily  gabapentin 600milliGRAM(s) Oral at bedtime  pantoprazole  Injectable 40milliGRAM(s) IV Push every 12 hours  furosemide   Injectable 40milliGRAM(s) IV Push daily        Assessment/Plan:   This is an 82 year old male with history of CAD s/p stents (LAD, RCA bare metal around 9/16), A.Fib not on anticoagulation due to GI bleeding, Hypertension, COPD on home O2 2L,   Chronic diastolic CHF, Hyperlipidemia, PVD, CKD III with baseline Cr 2 admitted for anemia and GIB now stable with ongoing LE wounds s/p angioplasty with vascular surgery on abx:    #Severe PAD.   - 05/11 endarterectomy of CFA plaque in an attempt of limb preservation.  05/08 angiogram + angioplasy.  stenting of right common iliac + external iliac arteries.  statin.   Further plan as per DR Clement.  - DISCUSSED with vascular, tentative debridement in OR on Saturday.   - cont PT, prn pain meds and vascular recs.     # Mouth Pain - no oral lesions, suspect 2/2 clip used in OR. Cepacol spray ordered.     #chronic right LE stasis ulcers, dermatitis w/ cellulitis.    - S/p 2 weeks of IV Cefepime, completed on 5/16.     #GIB/acute blood loss anemia.  s/p 5 units PRBCs. No plans for EGD given ongoing resp issues on chronic O2.   Cont PPI BID per GI. Hb ~10. STABLE.   GI f/u appreciated  - daily CBC. To decide on starting antiplatelet therapy in the setting of LE stent.     #pulmonary vascular congestion and LE edema.   # Scrotal Edema  - voiding, hoffman out on 5/15.   - rales resolved on PE, on IV lasix daily. Repeat CXR and appreciate pulm recs.   - incentive spirometer post op.       #ARYA upon CKD.  Cr improved, back on Lasix --> switched to PO on 5/17.   Nephrology following.    #multiple DVT RUE/RLL.  s/p IVC Filter.    DVT ppx: no pharmacological ppx 2/2 GIB, no venodynes 2/2 LLE wounds. Will start mobilization as tolerated.     Dispo: remain inpatient on Med-surg, awaiting further vascular recs.     Total time 35 mins. Discussed with patient and staff.

## 2017-05-18 NOTE — PROGRESS NOTE ADULT - ASSESSMENT
82 year old male with history of CAD s/p stents (LAD, RCA bare metal around 9/16), A.Fib not on anticoagulation due to GI bleeding, Hypertension, COPD on home O2 2L, SHAYY on nocturnal BIPAP, ex-smoker (smoked 1ppd X 50 years, quit 22 years ago),  Chronic diastolic CHF, Hyperlipidemia, PVD, Iron deficiency anemia, Chronic back pain, Gout, BPH, CKD III with baseline Cr 2, recently admitted to  in 4/17 with anemia of GI bleeding, RLE and RUE DVTs, received pRBCs and IVC filter discharged to rehab with aspirin was sent to  ER on 5/4/17 for worsening anemia with Hb 6, worsening leg pain from ulcers and worsening renal function Cr 3.99.  s/p vascular surgery, extubated and tolerated well  Advanced COPD on home o2  no active bronchospasm  CHF , appears clinically stable/ wt reported 215 lbs  SHAYY on Bipap  rest pulm dysfunction due to obesity  pulm status appears optimized if needs additional surgical interventions  data rev with Dr Clement today and Dtr at [facility]  suggest  repeat cxr today  incentive spirometry  bronchodilator rx  nocturnal and post op BIPAP useful  if lethargic or any new resp sxs will need ABG  good response to diuresis  cxr showed mild CHF and new small left sided effusion  diuresis as per renal  fu cxr in am  Pulm status is adeq optimized if repeat surgery needed for debridement  get repeat cxr today  cont nebulizer rx  post op BIPAP required  encourage incentive spirometry use  cont with Duoneb in nebulizer

## 2017-05-18 NOTE — PROGRESS NOTE ADULT - SUBJECTIVE AND OBJECTIVE BOX
NEPHROLOGY INTERVAL HPI/OVERNIGHT EVENTS:  no c/o doing well.  improved in perfusion of rle.  no immediate plans for angio.  possible debridement     5/18  Dr Pritchard recommendation of eschar debridement noted, R foot  pt doing well otherwise  creat down to 1.2 range still hypernatremic, chronic      MEDICATIONS  (STANDING):  buDESOnide   0.5 milliGRAM(s) Respule 0.5milliGRAM(s) Inhalation two times a day  doxazosin 8milliGRAM(s) Oral at bedtime  isosorbide   mononitrate ER Tablet (IMDUR) 120milliGRAM(s) Oral daily  diltiazem   CD 120milliGRAM(s) Oral daily  gabapentin 300milliGRAM(s) Oral daily  allopurinol 100milliGRAM(s) Oral daily  fenofibrate Tablet 145milliGRAM(s) Oral daily  simvastatin 10milliGRAM(s) Oral at bedtime  pramipexole 0.125milliGRAM(s) Oral three times a day  ALBUTerol    90 MICROgram(s) HFA Inhaler 2Puff(s) Inhalation every 6 hours  ammonium lactate 12% Lotion 1Application(s) Topical two times a day  fluticasone propionate 50 MICROgram(s)/spray Nasal Spray 1Spray(s) Both Nostrils two times a day  metoprolol 12.5milliGRAM(s) Oral two times a day  hydrALAZINE 150milliGRAM(s) Oral two times a day  lactobacillus acidophilus 1Tablet(s) Oral daily  gabapentin 600milliGRAM(s) Oral at bedtime  pantoprazole  Injectable 40milliGRAM(s) IV Push every 12 hours  furosemide   Injectable 40milliGRAM(s) IV Push daily  ALBUTerol/ipratropium for Nebulization. 3milliLiter(s) Nebulizer once    MEDICATIONS  (PRN):  traMADol 100milliGRAM(s) Oral every 6 hours PRN Moderate Pain (4 - 6)  aluminum hydroxide/magnesium hydroxide/simethicone Suspension 30milliLiter(s) Oral every 6 hours PRN Dyspepsia  oxyCODONE  5 mG/acetaminophen 325 mG 1Tablet(s) Oral every 4 hours PRN Severe Pain (7 - 10)  HYDROmorphone  Injectable 0.5milliGRAM(s) IV Push every 4 hours PRN Severe Pain (7 - 10)      Allergies    No Known Allergies    Intolerances        I&O's Detail    I & Os for current day (as of 17 May 2017 11:33)  =============================================  IN:    Total IN: 0 ml  ---------------------------------------------  OUT:    Voided: 401 ml    Total OUT: 401 ml  ---------------------------------------------  Total NET: -401 ml          Vital Signs Last 24 Hrs  T(C): 36.7, Max: 37.5 (05-16 @ 17:00)  T(F): 98, Max: 99.5 (05-16 @ 17:00)  HR: 75 (73 - 81)  BP: 139/53 (139/53 - 154/52)  BP(mean): 73 (73 - 73)  RR: 18 (16 - 18)  SpO2: 99% (97% - 99%)  Daily     Daily     PHYSICAL EXAM:  General: alert. awake Ox3  HEENT: MMM  CV: s1s2 rrr  LUNGS: B/L CTA  EXT: edema improved with LE dressing in place

## 2017-05-18 NOTE — PROGRESS NOTE ADULT - ASSESSMENT
# ARYA due to pre-renal azothemia with CKD stage 4 at baseline ( 2.0-2.5)  # Anemia due to acute blood loss anemia  # Sepsis? due to LE ulcers  # Multiple DVT on RUE/RLE with s/p IVC fileter  # HTN    5/15 MK  - ARYA/CKD , near his baseline when he is on no diuretics.  Will restart lasix at 40 mg iv daily and moniter response  - PVD with cellulitis with stasis: now improving in appearance, no angio for now as per dr dugan.  abx as per dr rashid  - Anemia with acute blood loss, h/h stable  - HYpernatremia: increase his intake of free water, chronically this range    5/16 SY  --ARYA/CKD   improved and stable.  Tolerated angiogram with stent without acute event.  No further studies planned at this point.  Lasix resumed.  Follow fluid status closely  --Hypernatremia : expect improvement with diuretics.  --PVD /LE edema and cellulitis.  Post Angiogram and stent placement,  Abtx completed.  Continue wound care.    5/17 MK  - ARYA/CKD now with stable paramenters.  change to po lasix   - Hypernatremia: moniter now on po lasix  - PVD with stasis ulcers with celllutits: possible debridement      5/18  Dr Pritchard recommendation of eschar debridement noted, R foot  pt doing well otherwise  creat down to 1.2 range still hypernatremic, chronic  on po  lasix

## 2017-05-19 LAB
ANION GAP SERPL CALC-SCNC: 8 MMOL/L — SIGNIFICANT CHANGE UP (ref 5–17)
BUN SERPL-MCNC: 69 MG/DL — HIGH (ref 7–23)
CALCIUM SERPL-MCNC: 8 MG/DL — LOW (ref 8.5–10.1)
CHLORIDE SERPL-SCNC: 118 MMOL/L — HIGH (ref 96–108)
CO2 SERPL-SCNC: 24 MMOL/L — SIGNIFICANT CHANGE UP (ref 22–31)
CREAT SERPL-MCNC: 1.28 MG/DL — SIGNIFICANT CHANGE UP (ref 0.5–1.3)
GLUCOSE SERPL-MCNC: 107 MG/DL — HIGH (ref 70–99)
HCT VFR BLD CALC: 31.6 % — LOW (ref 39–50)
HGB BLD-MCNC: 10 G/DL — LOW (ref 13–17)
MCHC RBC-ENTMCNC: 28.7 PG — SIGNIFICANT CHANGE UP (ref 27–34)
MCHC RBC-ENTMCNC: 31.7 GM/DL — LOW (ref 32–36)
MCV RBC AUTO: 90.4 FL — SIGNIFICANT CHANGE UP (ref 80–100)
PLATELET # BLD AUTO: 305 K/UL — SIGNIFICANT CHANGE UP (ref 150–400)
POTASSIUM SERPL-MCNC: 4.6 MMOL/L — SIGNIFICANT CHANGE UP (ref 3.5–5.3)
POTASSIUM SERPL-SCNC: 4.6 MMOL/L — SIGNIFICANT CHANGE UP (ref 3.5–5.3)
RBC # BLD: 3.5 M/UL — LOW (ref 4.2–5.8)
RBC # FLD: 14.9 % — HIGH (ref 10.3–14.5)
SODIUM SERPL-SCNC: 150 MMOL/L — HIGH (ref 135–145)
WBC # BLD: 5.1 K/UL — SIGNIFICANT CHANGE UP (ref 3.8–10.5)
WBC # FLD AUTO: 5.1 K/UL — SIGNIFICANT CHANGE UP (ref 3.8–10.5)

## 2017-05-19 RX ADMIN — SIMVASTATIN 10 MILLIGRAM(S): 20 TABLET, FILM COATED ORAL at 21:35

## 2017-05-19 RX ADMIN — Medication 0.5 MILLIGRAM(S): at 20:57

## 2017-05-19 RX ADMIN — ALBUTEROL 2 PUFF(S): 90 AEROSOL, METERED ORAL at 20:56

## 2017-05-19 RX ADMIN — Medication 150 MILLIGRAM(S): at 05:30

## 2017-05-19 RX ADMIN — Medication 100 MILLIGRAM(S): at 17:32

## 2017-05-19 RX ADMIN — Medication 1 APPLICATION(S): at 09:52

## 2017-05-19 RX ADMIN — Medication 150 MILLIGRAM(S): at 17:32

## 2017-05-19 RX ADMIN — Medication 1 TABLET(S): at 13:32

## 2017-05-19 RX ADMIN — PANTOPRAZOLE SODIUM 40 MILLIGRAM(S): 20 TABLET, DELAYED RELEASE ORAL at 05:29

## 2017-05-19 RX ADMIN — ALBUTEROL 2 PUFF(S): 90 AEROSOL, METERED ORAL at 07:52

## 2017-05-19 RX ADMIN — Medication 1 SPRAY(S): at 17:34

## 2017-05-19 RX ADMIN — PRAMIPEXOLE DIHYDROCHLORIDE 0.12 MILLIGRAM(S): 0.12 TABLET ORAL at 05:29

## 2017-05-19 RX ADMIN — Medication 1 APPLICATION(S): at 17:31

## 2017-05-19 RX ADMIN — ISOSORBIDE MONONITRATE 120 MILLIGRAM(S): 60 TABLET, EXTENDED RELEASE ORAL at 13:35

## 2017-05-19 RX ADMIN — Medication 1 APPLICATION(S): at 05:31

## 2017-05-19 RX ADMIN — Medication 120 MILLIGRAM(S): at 05:30

## 2017-05-19 RX ADMIN — SENNA PLUS 2 TABLET(S): 8.6 TABLET ORAL at 21:36

## 2017-05-19 RX ADMIN — Medication 0.5 MILLIGRAM(S): at 07:51

## 2017-05-19 RX ADMIN — Medication 12.5 MILLIGRAM(S): at 17:32

## 2017-05-19 RX ADMIN — PRAMIPEXOLE DIHYDROCHLORIDE 0.12 MILLIGRAM(S): 0.12 TABLET ORAL at 21:37

## 2017-05-19 RX ADMIN — PRAMIPEXOLE DIHYDROCHLORIDE 0.12 MILLIGRAM(S): 0.12 TABLET ORAL at 13:32

## 2017-05-19 RX ADMIN — PANTOPRAZOLE SODIUM 40 MILLIGRAM(S): 20 TABLET, DELAYED RELEASE ORAL at 17:34

## 2017-05-19 RX ADMIN — ALBUTEROL 2 PUFF(S): 90 AEROSOL, METERED ORAL at 13:56

## 2017-05-19 RX ADMIN — Medication 2 SPRAY(S): at 07:59

## 2017-05-19 RX ADMIN — GABAPENTIN 600 MILLIGRAM(S): 400 CAPSULE ORAL at 21:35

## 2017-05-19 RX ADMIN — Medication 100 MILLIGRAM(S): at 05:30

## 2017-05-19 RX ADMIN — GABAPENTIN 300 MILLIGRAM(S): 400 CAPSULE ORAL at 13:35

## 2017-05-19 RX ADMIN — Medication 12.5 MILLIGRAM(S): at 05:32

## 2017-05-19 RX ADMIN — Medication 8 MILLIGRAM(S): at 21:35

## 2017-05-19 RX ADMIN — Medication 1 SPRAY(S): at 05:31

## 2017-05-19 RX ADMIN — Medication 100 MILLIGRAM(S): at 13:35

## 2017-05-19 RX ADMIN — Medication 40 MILLIGRAM(S): at 05:30

## 2017-05-19 RX ADMIN — Medication 145 MILLIGRAM(S): at 13:35

## 2017-05-19 NOTE — PROGRESS NOTE ADULT - SUBJECTIVE AND OBJECTIVE BOX
Patient is a 82y old  Male who presents with a chief complaint of Worsening leg pain, anemia, ARYA sent from Rehab (04 May 2017 15:56)      HPI:  82 year old male with history of CAD s/p stents (LAD, RCA bare metal around 9/16), A.Fib not on anticoagulation due to GI bleeding, Hypertension, COPD on home O2 2L, SHAYY on nocturnal BIPAP, ex-smoker (smoked 1ppd X 50 years, quit 22 years ago),  Chronic diastolic CHF, Hyperlipidemia, PVD, Iron deficiency anemia, Chronic back pain, Gout, BPH, CKD III with baseline Cr 2, recently admitted to  in 4/17 with anemia of GI bleeding, RLE and RUE DVTs, received pRBCs and IVC filter discharged to rehab with aspirin was sent to  ER on 5/4/17 for worsening anemia with Hb 6, worsening leg pain from ulcers and worsening renal function Cr 3.99.    Pt seen bed side.  Not well oriented currently after receiving Dilaudid for pain in ER.  As per daughters, pt was walking with a walker at rehab and was doing better.  Pt was found to have tarry black stools in ER, guaic positive. (04 May 2017 15:56)    pt comf  has mild reflux with laying flat and improved with HOB elevated  vida diet  has mild R foot pain  neg cp or sob  neg blood in stool        PAST MEDICAL & SURGICAL HISTORY:  Sepsis, due to unspecified organism: 2/2 poorly healing wounds b/l  BPH (benign prostatic hypertrophy)  Hyperlipemia  Coronary artery disease  Hypertension  Dyspepsia: On moderate exertion.  Sleep apnea, obstructive: Requires home 02 therapy, and treatment with BIPAP  Atelectasis  Pleural effusion, bilateral  Respiratory failure  Peripheral edema  CRI (chronic renal insufficiency)  Gout  CRF (chronic renal failure)  Benign prostatic hypertrophy  Spinal stenosis  Hypercholesterolemia  GERD (gastroesophageal reflux disease)  CAD (coronary artery disease)  Hypertension  S/P angioplasty with stent  Cataract of left eye  Prostate: Surgery green light procedure.  S/P rotator cuff surgery: Right  S/P angioplasty  Rotator cuff tear, right: repair      MEDICATIONS  (STANDING):  buDESOnide   0.5 milliGRAM(s) Respule 0.5milliGRAM(s) Inhalation two times a day  doxazosin 8milliGRAM(s) Oral at bedtime  isosorbide   mononitrate ER Tablet (IMDUR) 120milliGRAM(s) Oral daily  diltiazem   CD 120milliGRAM(s) Oral daily  gabapentin 300milliGRAM(s) Oral daily  allopurinol 100milliGRAM(s) Oral daily  fenofibrate Tablet 145milliGRAM(s) Oral daily  simvastatin 10milliGRAM(s) Oral at bedtime  pramipexole 0.125milliGRAM(s) Oral three times a day  ALBUTerol    90 MICROgram(s) HFA Inhaler 2Puff(s) Inhalation every 6 hours  ammonium lactate 12% Lotion 1Application(s) Topical two times a day  fluticasone propionate 50 MICROgram(s)/spray Nasal Spray 1Spray(s) Both Nostrils two times a day  metoprolol 12.5milliGRAM(s) Oral two times a day  hydrALAZINE 150milliGRAM(s) Oral two times a day  lactobacillus acidophilus 1Tablet(s) Oral daily  gabapentin 600milliGRAM(s) Oral at bedtime  pantoprazole  Injectable 40milliGRAM(s) IV Push every 12 hours  furosemide    Tablet 40milliGRAM(s) Oral daily  docusate sodium 100milliGRAM(s) Oral two times a day  senna 2Tablet(s) Oral at bedtime    MEDICATIONS  (PRN):  aluminum hydroxide/magnesium hydroxide/simethicone Suspension 30milliLiter(s) Oral every 6 hours PRN Dyspepsia  HYDROmorphone  Injectable 0.5milliGRAM(s) IV Push every 4 hours PRN Severe Pain (7 - 10)  phenol 1.4% (CHLORASEPTIC) Oral Spray 2Spray(s) Topical three times a day PRN mouth  care  oxyCODONE  5 mG/acetaminophen 325 mG 1Tablet(s) Oral every 4 hours PRN Moderate Pain (4 - 6)      Allergies    No Known Allergies    Intolerances        SOCIAL HISTORY:unchanged    FAMILY HISTORY:  No pertinent family history in first degree relatives      REVIEW OF SYSTEMS:    CONSTITUTIONAL: No weakness, fevers or chills  EYES/ENT: No visual changes;  No vertigo or throat pain   NECK: No pain or stiffness  RESPIRATORY: No cough, wheezing, hemoptysis; No shortness of breath  CARDIOVASCULAR: No chest pain or palpitations  GENITOURINARY: No dysuria, frequency or hematuria  NEUROLOGICAL: No numbness or weakness  SKIN: No itching, burning, rashes, or lesions   All other review of systems is negative unless indicated above.    Vital Signs Last 24 Hrs  T(C): 36.9, Max: 37.6 (05-18 @ 17:55)  T(F): 98.5, Max: 99.6 (05-18 @ 17:55)  HR: 80 (68 - 83)  BP: 164/51 (149/45 - 164/51)  BP(mean): 98 (98 - 98)  RR: 18 (16 - 18)  SpO2: 98% (97% - 98%)    PHYSICAL EXAM:    Constitutional: NAD, well-developed  HEENT: EOMI, throat clear  Neck: No LAD, supple  Respiratory: CTA and P  Cardiovascular: S1 and S2, RRR, no M  Gastrointestinal: BS+, soft, NT/ND, neg HSM,  Extremities: pos peripheral edema, neg clubing, cyanosis  LE ulcer      Neurological: A/O x 3, no focal deficits  Psychiatric: Normal mood, normal affect  Skin: No rashes    LABS:  CBC Full  -  ( 19 May 2017 06:53 )  WBC Count : 5.1 K/uL  Hemoglobin : 10.0 g/dL  Hematocrit : 31.6 %  Platelet Count - Automated : 305 K/uL  Mean Cell Volume : 90.4 fl  Mean Cell Hemoglobin : 28.7 pg  Mean Cell Hemoglobin Concentration : 31.7 gm/dL  Auto Neutrophil # : x  Auto Lymphocyte # : x  Auto Monocyte # : x  Auto Eosinophil # : x  Auto Basophil # : x  Auto Neutrophil % : x  Auto Lymphocyte % : x  Auto Monocyte % : x  Auto Eosinophil % : x  Auto Basophil % : x    05-19    150<H>  |  118<H>  |  69<H>  ----------------------------<  107<H>  4.6   |  24  |  1.28    Ca    8.0<L>      19 May 2017 06:53              RADIOLOGY & ADDITIONAL STUDIES:

## 2017-05-19 NOTE — PROGRESS NOTE ADULT - ASSESSMENT
anemia, likely multifactorial  I suspect that there is a component of GI bleeding, appears stabilized on PPI  and hct stable  but concerned with repeat melena.  this may be old  will check HCT and serial H H     He has received multiple units of packed red blood cells     protonix po bid   Serial H&H'    lower extremity DVT, status post IVC filter    Sepsis likely secondary to lower extremity cellulitis, improved    Advanced COPD and obstructive sleep apnea reviewed.    DW  Dr Henry, inc risk bleed with anti plt

## 2017-05-19 NOTE — PROGRESS NOTE ADULT - ASSESSMENT
ischemic eschar R foot     debridement tomorrow; discussed with patient and daughter at bedside    Also, noted that further revascularization may be necessary

## 2017-05-19 NOTE — PROGRESS NOTE ADULT - ASSESSMENT
82 year old male with history of CAD s/p stents (LAD, RCA bare metal around 9/16), A.Fib not on anticoagulation due to GI bleeding, Hypertension, COPD on home O2 2L, SHAYY on nocturnal BIPAP, ex-smoker (smoked 1ppd X 50 years, quit 22 years ago),  Chronic diastolic CHF, Hyperlipidemia, PVD, Iron deficiency anemia, Chronic back pain, Gout, BPH, CKD III with baseline Cr 2, recently admitted to  in 4/17 with anemia of GI bleeding, RLE and RUE DVTs, received pRBCs and IVC filter discharged to rehab with aspirin was sent to  ER on 5/4/17 for worsening anemia with Hb 6, worsening leg pain from ulcers and worsening renal function Cr 3.99.  s/p vascular surgery, extubated and tolerated well  Advanced COPD on home o2  no active bronchospasm  CHF , appears clinically stable/ wt reported 215 lbs  SHAYY on Bipap  rest pulm dysfunction due to obesity  pulm status appears optimized if needs additional surgical interventions  data rev with Dr Clement today and Dtr at [facility]  suggest  repeat cxr today  incentive spirometry  bronchodilator rx  nocturnal and post op BIPAP useful  if lethargic or any new resp sxs will need ABG  good response to diuresis  cxr showed mild CHF and new small left sided effusion  diuresis as per renal  fu cxr in am  Pulm status is adeq optimized if repeat surgery needed for debridement  get repeat cxr today  cont nebulizer rx  post op BIPAP required  encourage incentive spirometry use  cont with Duoneb in nebulizer  cxr is clear  pulm status is optimized for scheduled surgery

## 2017-05-19 NOTE — PROGRESS NOTE ADULT - ASSESSMENT
# ARYA due to pre-renal azothemia with CKD stage 4 at baseline ( 2.0-2.5)  # Anemia due to acute blood loss anemia  # Sepsis? due to LE ulcers  # Multiple DVT on RUE/RLE with s/p IVC fileter  # HTN    5/15 MK  - ARYA/CKD , near his baseline when he is on no diuretics.  Will restart lasix at 40 mg iv daily and moniter response  - PVD with cellulitis with stasis: now improving in appearance, no angio for now as per dr dugan.  abx as per dr rashid  - Anemia with acute blood loss, h/h stable  - HYpernatremia: increase his intake of free water, chronically this range    5/16 SY  --ARYA/CKD   improved and stable.  Tolerated angiogram with stent without acute event.  No further studies planned at this point.  Lasix resumed.  Follow fluid status closely  --Hypernatremia : expect improvement with diuretics.  --PVD /LE edema and cellulitis.  Post Angiogram and stent placement,  Abtx completed.  Continue wound care.    5/17 MK  - ARYA/CKD now with stable paramenters.  change to po lasix   - Hypernatremia: moniter now on po lasix  - PVD with stasis ulcers with celllutits: possible debridement      5/18  Dr Pritchard recommendation of eschar debridement noted, R foot  pt doing well otherwise  creat down to 1.2 range still hypernatremic, chronic  on po  lasix    5/19 mk  - Stable CKD: will increse his lasix to 80 mg  - Chronic hypernatremia: moniter on increased po intake  - for debridement in am will await decision regarding timing of next angiogram

## 2017-05-19 NOTE — PROGRESS NOTE ADULT - SUBJECTIVE AND OBJECTIVE BOX
NEPHROLOGY INTERVAL HPI/OVERNIGHT EVENTS:  doing well, no c/o for debridement tomorrow    MEDICATIONS  (STANDING):  buDESOnide   0.5 milliGRAM(s) Respule 0.5milliGRAM(s) Inhalation two times a day  doxazosin 8milliGRAM(s) Oral at bedtime  isosorbide   mononitrate ER Tablet (IMDUR) 120milliGRAM(s) Oral daily  diltiazem   CD 120milliGRAM(s) Oral daily  gabapentin 300milliGRAM(s) Oral daily  allopurinol 100milliGRAM(s) Oral daily  fenofibrate Tablet 145milliGRAM(s) Oral daily  simvastatin 10milliGRAM(s) Oral at bedtime  pramipexole 0.125milliGRAM(s) Oral three times a day  ALBUTerol    90 MICROgram(s) HFA Inhaler 2Puff(s) Inhalation every 6 hours  ammonium lactate 12% Lotion 1Application(s) Topical two times a day  fluticasone propionate 50 MICROgram(s)/spray Nasal Spray 1Spray(s) Both Nostrils two times a day  metoprolol 12.5milliGRAM(s) Oral two times a day  hydrALAZINE 150milliGRAM(s) Oral two times a day  lactobacillus acidophilus 1Tablet(s) Oral daily  gabapentin 600milliGRAM(s) Oral at bedtime  pantoprazole  Injectable 40milliGRAM(s) IV Push every 12 hours  furosemide    Tablet 40milliGRAM(s) Oral daily  docusate sodium 100milliGRAM(s) Oral two times a day  senna 2Tablet(s) Oral at bedtime    MEDICATIONS  (PRN):  aluminum hydroxide/magnesium hydroxide/simethicone Suspension 30milliLiter(s) Oral every 6 hours PRN Dyspepsia  HYDROmorphone  Injectable 0.5milliGRAM(s) IV Push every 4 hours PRN Severe Pain (7 - 10)  phenol 1.4% (CHLORASEPTIC) Oral Spray 2Spray(s) Topical three times a day PRN mouth  care  oxyCODONE  5 mG/acetaminophen 325 mG 1Tablet(s) Oral every 4 hours PRN Moderate Pain (4 - 6)      Allergies    No Known Allergies    Intolerances        I&O's Detail    Vital Signs Last 24 Hrs  T(C): 36.9, Max: 37.6 (05-18 @ 17:55)  T(F): 98.5, Max: 99.6 (05-18 @ 17:55)  HR: 80 (70 - 83)  BP: 164/51 (151/44 - 164/51)  BP(mean): 98 (98 - 98)  RR: 18 (18 - 18)  SpO2: 98% (98% - 98%)  Daily     Daily     PHYSICAL EXAM:  General: alert. awake Ox3  HEENT: MMM  CV: s1s2 rrr  LUNGS: B/L CTA  EXT: + edema with LE dressing in place    LABS:                        10.0   5.1   )-----------( 305      ( 19 May 2017 06:53 )             31.6     05-19    150<H>  |  118<H>  |  69<H>  ----------------------------<  107<H>  4.6   |  24  |  1.28    Ca    8.0<L>      19 May 2017 06:53

## 2017-05-19 NOTE — PROGRESS NOTE ADULT - SUBJECTIVE AND OBJECTIVE BOX
afebrile, no new developments    will debride R foot eschar tomorrow    MEDICATIONS  (STANDING):  buDESOnide   0.5 milliGRAM(s) Respule 0.5milliGRAM(s) Inhalation two times a day  doxazosin 8milliGRAM(s) Oral at bedtime  isosorbide   mononitrate ER Tablet (IMDUR) 120milliGRAM(s) Oral daily  diltiazem   CD 120milliGRAM(s) Oral daily  gabapentin 300milliGRAM(s) Oral daily  allopurinol 100milliGRAM(s) Oral daily  fenofibrate Tablet 145milliGRAM(s) Oral daily  simvastatin 10milliGRAM(s) Oral at bedtime  pramipexole 0.125milliGRAM(s) Oral three times a day  ALBUTerol    90 MICROgram(s) HFA Inhaler 2Puff(s) Inhalation every 6 hours  ammonium lactate 12% Lotion 1Application(s) Topical two times a day  fluticasone propionate 50 MICROgram(s)/spray Nasal Spray 1Spray(s) Both Nostrils two times a day  metoprolol 12.5milliGRAM(s) Oral two times a day  hydrALAZINE 150milliGRAM(s) Oral two times a day  lactobacillus acidophilus 1Tablet(s) Oral daily  gabapentin 600milliGRAM(s) Oral at bedtime  pantoprazole  Injectable 40milliGRAM(s) IV Push every 12 hours  furosemide    Tablet 40milliGRAM(s) Oral daily  docusate sodium 100milliGRAM(s) Oral two times a day  senna 2Tablet(s) Oral at bedtime    MEDICATIONS  (PRN):  aluminum hydroxide/magnesium hydroxide/simethicone Suspension 30milliLiter(s) Oral every 6 hours PRN Dyspepsia  HYDROmorphone  Injectable 0.5milliGRAM(s) IV Push every 4 hours PRN Severe Pain (7 - 10)  phenol 1.4% (CHLORASEPTIC) Oral Spray 2Spray(s) Topical three times a day PRN mouth  care  oxyCODONE  5 mG/acetaminophen 325 mG 1Tablet(s) Oral every 4 hours PRN Moderate Pain (4 - 6)      Allergies    No Known Allergies    Intolerances        Flatus: [ ] YES [ ] NO             Bowel Movement: [ ] YES [ ] NO  Pain (0-10):            Pain Control Adequate: [ ] YES [ ] NO  Nausea: [ ] YES [ ] NO            Vomiting: [ ] YES [ ] NO  Diarrhea: [ ] YES [ ] NO         Constipation: [ ] YES [ ] NO     Chest Pain: [ ] YES [ ] NO    SOB:  [ ] YES [ ] NO    Vital Signs Last 24 Hrs  T(C): 36.9, Max: 37.6 (05-18 @ 17:55)  T(F): 98.5, Max: 99.6 (05-18 @ 17:55)  HR: 77 (68 - 83)  BP: 164/51 (149/45 - 164/51)  BP(mean): 98 (98 - 98)  RR: 18 (16 - 18)  SpO2: 98% (97% - 98%)    I&O's Summary      Physical Exam:  General: NAD, resting comfortably  Pulmonary: normal resp effort, CTA-B  Cardiovascular: NSR  Abdominal: soft, NT/ND  Extremities: WWP, normal strength  Neuro: A/O x 3, CNs II-XII grossly intact, normal motor/sensation, no focal deficits  Pulses:   R foot eschar dry; R foot pink, warm and well perfused.  L foot pink and warm    LABS:                        10.0   4.9   )-----------( 306      ( 18 May 2017 07:07 )             32.6     05-18    149<H>  |  119<H>  |  71<H>  ----------------------------<  98  4.6   |  22  |  1.25    Ca    8.0<L>      18 May 2017 07:07            CAPILLARY BLOOD GLUCOSE      RADIOLOGY & ADDITIONAL TESTS:

## 2017-05-19 NOTE — PROGRESS NOTE ADULT - SUBJECTIVE AND OBJECTIVE BOX
Patient is a 82y old  Male who presents with a chief complaint of Worsening leg pain, anemia, ARYA sent from Rehab (04 May 2017 15:56)      HPI:  82 year old male with history of CAD s/p stents (LAD, RCA bare metal around 9/16), A.Fib not on anticoagulation due to GI bleeding, Hypertension, COPD on home O2 2L, SHAYY on nocturnal BIPAP, ex-smoker (smoked 1ppd X 50 years, quit 22 years ago),  Chronic diastolic CHF, Hyperlipidemia, PVD, Iron deficiency anemia, Chronic back pain, Gout, BPH, CKD III with baseline Cr 2, recently admitted to  in 4/17 with anemia of GI bleeding, RLE and RUE DVTs, received pRBCs and IVC filter discharged to rehab with aspirin was sent to  ER on 5/4/17 for worsening anemia with Hb 6, worsening leg pain from ulcers and worsening renal function Cr 3.99.    Pt seen bed side.  Not well oriented currently after receiving Dilaudid for pain in ER.  As per daughters, pt was walking with a walker at rehab and was doing better.  Pt was found to have tarry black stools in ER, guaic positive. (04 May 2017 15:56)    data rev with pt's RN and DTR at bedside today  tolerated his vascular surgery well , extubated and is having bkfst in bed this am  no cp  no sig cough or wheeze  uses his biapp at night    5/13  needed diuresis yesterday  feels better today  DTr at bedside  uses incentive spirometry  no cp  5/16  pt's dtr at bedside  feels ok  no breathing issues  Dr Clement's eval in progress and may need repeat OR  5/18  seen and eval on 5 [place]  breathing is ok  data rev with Dr Clement as well this am  pt may need debridement surgery in 1-2 days  no active pulm sxs now  5/19  feels ok  needs Or for debridement in am  data rev with Dr Clement  PAST MEDICAL & SURGICAL HISTORY:  Sepsis, due to unspecified organism: 2/2 poorly healing wounds b/l  BPH (benign prostatic hypertrophy)  Hyperlipemia  Coronary artery disease  Hypertension  Dyspepsia: On moderate exertion.  Sleep apnea, obstructive: Requires home 02 therapy, and treatment with BIPAP  Atelectasis  Pleural effusion, bilateral  Respiratory failure  Peripheral edema  CRI (chronic renal insufficiency)  Gout  CRF (chronic renal failure)  Benign prostatic hypertrophy  Spinal stenosis  Hypercholesterolemia  GERD (gastroesophageal reflux disease)  CAD (coronary artery disease)  Hypertension  S/P angioplasty with stent  Cataract of left eye  Prostate: Surgery green light procedure.  S/P rotator cuff surgery: Right  S/P angioplasty  Rotator cuff tear, right: repair      PREVIOUS DIAGNOSTIC TESTING:      MEDICATIONS  (STANDING):  buDESOnide   0.5 milliGRAM(s) Respule 0.5milliGRAM(s) Inhalation two times a day  doxazosin 8milliGRAM(s) Oral at bedtime  isosorbide   mononitrate ER Tablet (IMDUR) 120milliGRAM(s) Oral daily  diltiazem   CD 120milliGRAM(s) Oral daily  gabapentin 300milliGRAM(s) Oral daily  allopurinol 100milliGRAM(s) Oral daily  fenofibrate Tablet 145milliGRAM(s) Oral daily  simvastatin 10milliGRAM(s) Oral at bedtime  pramipexole 0.125milliGRAM(s) Oral three times a day  ALBUTerol    90 MICROgram(s) HFA Inhaler 2Puff(s) Inhalation every 6 hours  ammonium lactate 12% Lotion 1Application(s) Topical two times a day  fluticasone propionate 50 MICROgram(s)/spray Nasal Spray 1Spray(s) Both Nostrils two times a day  metoprolol 12.5milliGRAM(s) Oral two times a day  hydrALAZINE 150milliGRAM(s) Oral two times a day  lactobacillus acidophilus 1Tablet(s) Oral daily  gabapentin 600milliGRAM(s) Oral at bedtime  pantoprazole  Injectable 40milliGRAM(s) IV Push every 12 hours  furosemide    Tablet 40milliGRAM(s) Oral daily  docusate sodium 100milliGRAM(s) Oral two times a day  senna 2Tablet(s) Oral at bedtime    MEDICATIONS  (PRN):  aluminum hydroxide/magnesium hydroxide/simethicone Suspension 30milliLiter(s) Oral every 6 hours PRN Dyspepsia  HYDROmorphone  Injectable 0.5milliGRAM(s) IV Push every 4 hours PRN Severe Pain (7 - 10)  phenol 1.4% (CHLORASEPTIC) Oral Spray 2Spray(s) Topical three times a day PRN mouth  care  oxyCODONE  5 mG/acetaminophen 325 mG 1Tablet(s) Oral every 4 hours PRN Moderate Pain (4 - 6)          FAMILY HISTORY:  No pertinent family history in first degree relatives      REVIEW OF SYSTEM:  Pertinent items are noted in HPI.  Constitutional negative for chills, fevers, sweats and weight loss  throat, and face:  negative for epistaxis, nasal congestion, sore throat and   tinnitus  Respiratory:pos dyspnea on exertion, pleuritic chest pain  and wheezing  Cardiovascular:  negative for chest pain, dyspnea and palpitations  Gastrointestinal: negative for abdominal pain, diarrhea, nausea and vomiting  Genitourinary: negative for dysuria, frequency and urinary incontinence  Skin:  negative for redness, rash, pruritus, swelling, dryness and   fissures  Hematologic/lymphatic: negative for bleeding and easy bruising  Musculoskeletal: posfor arthralgias, back pain and muscle weakness  Neurological: negative for dizziness, headaches, seizures and tremors  Behavioral/Psych:  negative for mood change, depression, suicidal attempts    Vital Signs Last 24 Hrs  T(C): 36.9, Max: 37.6 (05-18 @ 17:55)  T(F): 98.5, Max: 99.6 (05-18 @ 17:55)  HR: 80 (68 - 83)  BP: 164/51 (149/45 - 164/51)  BP(mean): 98 (98 - 98)  RR: 18 (16 - 18)  SpO2: 98% (97% - 98%)  I&O's Summary    I & Os for current day (as of 12 May 2017 08:38)  =============================================  IN: 3863.8 ml / OUT: 1260 ml / NET: 2603.8 ml    PHYSICAL EXAM  General Appearance: cooperative, no acute distress,   HEENT: PERRL, conjunctiva clear, EOM's intact,   Neck: Supple, , no adenopathy, thyroid: not enlarged, no carotid bruit or JVD  Back: Symmetric, no  tenderness,no soft tissue tenderness  Lungs: Clear to auscultation bilateral,no adventitious breath sounds, normal   expiratory phase, few bibasilar rales  Heart: Regular rate and rhythm, S1, S2 normal, no murmur, rub or gallop  Abdomen: Soft, non-tender, bowel sounds active , no hepatosplenomegaly  Extremities:cyanotic right toes, chrnic peripheral/ distal ulcerations/ peripheral edema  Skin: Skin color, texture normal, no rashes   Neurologic: Alert and oriented X3 , cranial nerves intact, sensory and motor normal,    ECG:                            10.8   4.9   )-----------( 300      ( 17 May 2017 07:47 )             35.6       05-17    150<H>  |  119<H>  |  77<H>  ----------------------------<  106<H>  4.7   |  23  |  1.39<H>    Ca    7.9<L>      17 May 2017 07:47      LABS:                        10.2   6.4   )-----------( 302      ( 15 May 2017 05:19 )             33.5   05-15    148<H>  |  120<H>  |  80<H>  ----------------------------<  97  4.7   |  19<L>  |  1.49<H>    Ca    7.9<L>      15 May 2017 05:19                            10.7   8.3   )-----------( 244      ( 12 May 2017 06:42 )             34.0     05-12    145  |  118<H>  |  95<H>  ----------------------------<  124<H>  5.4<H>   |  18<L>  |  2.09<H>    Ca    7.8<L>      12 May 2017 05:33      CARDIAC MARKERS ( 12 May 2017 05:33 )  0.026 ng/mL / x     / x     / x     / x      CARDIAC MARKERS ( 11 May 2017 12:55 )  0.033 ng/mL / x     / x     / x     / x              PROCEDURE DATE:  05/04/2017        INTERPRETATION:  History: GI bleed admission    Chest:  one view.      Comparison: 3/31/2017    AP radiograph of the chest demonstrates no evidence of infiltrate,   pleural effusion or vascular congestion. No atelectasis is seen. The   cardiac silhouette is normal in size. Osseous structures are intact.    Impression: No active pulmonary disease.    Contrast:     Oral contrast only    Comparison: CT abdomen and pelvis 6/13/2016    Findings:  LIVER: Normal.  SPLEEN: Normal.  PANCREAS: Normal.  GALLBLADDER/BILIARY TREE: Nondilated. Gallstone is present.  ADRENALS: Normal.  KIDNEYS: No calcification, hydronephrosis, or soft tissue attenuating   renal mass.  LYMPHADENOPATHY/RETROPERITONEUM: No adenopathy.  VASCULATURE: Extensive aortoiliac vascular calcification without   aneurysm. Infrarenal vena cava filter in place.  BOWEL: No bowel related abnormality. Specifically, no bowel obstruction.  PELVIC VISCERA: Calcified BPH in the prostate is noted.  PELVIC LYMPH NODES: No pelvic adenopathy.  PERITONEUM/ABDOMINAL WALL: No free air orascites.  SKELETAL: No acute bony abnormality.  LUNG BASES: Clear.    IMPRESSION:     Preponderance of abdominal visceral adipose tissue without evidence for   obstruction, mass, or ascites.          RADIOLOGY & ADDITIONAL STUDIES:  cxr noted and results discussed with pt and dtr today  5/12  mild PVM and small left sided effusion    PROCEDURE DATE:  05/12/2017        INTERPRETATION:  CHEST SINGLE VIEW FRONTAL    Single AP view    HISTORY:  preop    Comparison:  Chest x-ray 5/4/2017    The cardiac silhouette is within normal limits. Mild pulmonary vascular   congestion and small left effusion.    IMPRESSION: Mild pulmonary vascular congestion and small left pleural   effusion has developed.      PROCEDURE DATE:  05/14/2017        INTERPRETATION:  EXAMINATION DATE: May 14, 2017 at 0902 hours.  CLINICAL INFORMATION: CHF.    TECHNIQUE: Frontal view of the chest   COMPARISON: May 12, 2017.    FINDINGS:    LUNGS/PLEURA: Small left pleural effusion with basilar atelectasis,   unchanged. No pneumothorax.  MEDIASTINUM: Cardiac silhouette is enlarged.  OTHER: None.    IMPRESSION:     Since May 12, 2017, unchanged small left pleural effusion with basilar   atelectasis.    PROCEDURE DATE:  05/14/2017        INTERPRETATION:  EXAMINATION DATE: May 14, 2017 at 0902 hours.  CLINICAL INFORMATION: CHF.    TECHNIQUE: Frontal view of the chest   COMPARISON: May 12, 2017.    FINDINGS:    LUNGS/PLEURA: Small left pleural effusion with basilar atelectasis,   unchanged. No pneumothorax.  MEDIASTINUM: Cardiac silhouette is enlarged.  OTHER: None.    IMPRESSION:     Since May 12, 2017, unchanged small left pleural effusion with basilar   atelectasis.  cxr 5/18  PROCEDURE DATE:  05/18/2017        INTERPRETATION:  Clinical information: Congestive heart failure    Frontal view of the chest from 0905 hours:     COMPARISON:  May 14, 2017    The cardiac, hilar and mediastinal contours are unremarkable. The lungs   are clear. There has been clearing of the previously described left   basilar opacity. The osseous structures demonstrate no acute abnormality.    IMPRESSION:    Clear lungs

## 2017-05-19 NOTE — PROGRESS NOTE ADULT - SUBJECTIVE AND OBJECTIVE BOX
CC: Anemia, LE wounds.     HPI: 82 year old male with history of CAD s/p stents (LAD, RCA bare metal around 9/16), A.Fib not on anticoagulation due to GI bleeding, Hypertension, COPD on home O2 2L, SHAYY on nocturnal BIPAP, ex-smoker (smoked 1ppd X 50 years, quit 22 years ago),  Chronic diastolic CHF, Hyperlipidemia, PVD, Iron deficiency anemia, Chronic back pain, Gout, BPH, CKD III with baseline Cr 2, recently admitted to  in 4/17 with anemia of GI bleeding, RLE and RUE DVTs, received pRBCs and IVC filter discharged to rehab with aspirin was sent to  ER on 5/4/17 for worsening anemia with Hb 6, worsening leg pain from ulcers and worsening renal function Cr 3.99. In ED, + guaiac.     Hospital course: prolonged including stabilization of suspected GIB with RBC transfusion and PPI BID. EGD on hold given ongoing LE wounds and angioplasty this admission. Patient underwent RLE stent and angioplasty with Vascular and prolonged IV Cefepime for wounds. HD stable.     5/19/17: No complaints, no CP or SOB. + BM this AM. Awaiting debridement of LE tomorrow AM.     ROS: stated above.     Vital Signs Last 24 Hrs  T(C): 37.1, Max: 37.6 (05-18 @ 17:55)  T(F): 98.8, Max: 99.6 (05-18 @ 17:55)  HR: 80 (70 - 83)  BP: 144/48 (144/48 - 164/51)  BP(mean): 98 (98 - 98)  RR: 18 (18 - 18)  SpO2: 99% (98% - 99%)    Physical exam:  Appears chronically ill and obese male in NAD, awake and alert.   HEENT: PERRLA  Lungs- decrease air entry at bases, no active wheezing.   S1S2, normal rhthym  Abd- soft, NT, BS+, obese and obese.   Ext- dressing both legs,. + 2 LE edema.  Black ischar to dorsal surface and some surrounding redness. No pain on RLE.   : + scrotal edema now voiding w/o hoffman  Neuro- AAOx3    LABS:                        10.0   5.1   )-----------( 305      ( 19 May 2017 06:53 )             31.6                           10.0   4.9   )-----------( 306      ( 18 May 2017 07:07 )             32.6        05-19    150<H>  |  118<H>  |  69<H>  ----------------------------<  107<H>  4.6   |  24  |  1.28    Ca    8.0<L>      19 May 2017 06:53        5/14 CXR: Since May 12, 2017, unchanged small left pleural effusion with basilar   atelectasis.    MEDICATIONS  (STANDING):  buDESOnide   0.5 milliGRAM(s) Respule 0.5milliGRAM(s) Inhalation two times a day  doxazosin 8milliGRAM(s) Oral at bedtime  isosorbide   mononitrate ER Tablet (IMDUR) 120milliGRAM(s) Oral daily  diltiazem   CD 120milliGRAM(s) Oral daily  gabapentin 300milliGRAM(s) Oral daily  allopurinol 100milliGRAM(s) Oral daily  fenofibrate Tablet 145milliGRAM(s) Oral daily  simvastatin 10milliGRAM(s) Oral at bedtime  pramipexole 0.125milliGRAM(s) Oral three times a day  ALBUTerol    90 MICROgram(s) HFA Inhaler 2Puff(s) Inhalation every 6 hours  ammonium lactate 12% Lotion 1Application(s) Topical two times a day  fluticasone propionate 50 MICROgram(s)/spray Nasal Spray 1Spray(s) Both Nostrils two times a day  metoprolol 12.5milliGRAM(s) Oral two times a day  hydrALAZINE 150milliGRAM(s) Oral two times a day  lactobacillus acidophilus 1Tablet(s) Oral daily  gabapentin 600milliGRAM(s) Oral at bedtime  pantoprazole  Injectable 40milliGRAM(s) IV Push every 12 hours  furosemide    Tablet 40milliGRAM(s) Oral daily  docusate sodium 100milliGRAM(s) Oral two times a day  senna 2Tablet(s) Oral at bedtime    Assessment/Plan:   This is an 82 year old male with history of CAD s/p stents (LAD, RCA bare metal around 9/16), A.Fib not on anticoagulation due to GI bleeding, Hypertension, COPD on home O2 2L,   Chronic diastolic CHF, Hyperlipidemia, PVD, CKD III with baseline Cr 2 admitted for anemia and GIB now stable with ongoing LE wounds s/p angioplasty with vascular surgery on abx:    #Severe PAD.   - 05/11 endarterectomy of CFA plaque in an attempt of limb preservation.  05/08 angiogram + angioplasy.  stenting of right common iliac + external iliac arteries.  statin.   - awaiting repeat debridement tomorrow.   - on statin. No Asp/ plavix as yet given concern for GI bleed (no H&H stable for days)  - cont PT, prn pain meds and vascular recs.     # Mouth Pain - no oral lesions, suspect 2/2 clip used in OR. Cepacol spray ordered.     #chronic right LE stasis ulcers, dermatitis w/ cellulitis.    - S/p 2 weeks of IV Cefepime, completed on 5/16.     #GIB/acute blood loss anemia.  s/p 5 units PRBCs. No plans for EGD given ongoing resp issues on chronic O2.   Cont PPI BID per GI. Hb ~10. STABLE.   GI f/u appreciated  - daily CBC. To decide on starting antiplatelet therapy in the setting of LE stent.       #ARYA upon CKD.  Cr improved, back on Lasix --> switched to PO on 5/17.   Nephrology following.    #multiple DVT RUE/RLL.  s/p IVC Filter.    DVT ppx: no pharmacological ppx 2/2 GIB, no venodynes 2/2 LLE wounds. Will start mobilization as tolerated.     Dispo: remain inpatient on Med-surg, NPO past midnight for debridement tomorrow.     Total time 35 mins. Discussed with patient and staff.

## 2017-05-20 ENCOUNTER — RESULT REVIEW (OUTPATIENT)
Age: 82
End: 2017-05-20

## 2017-05-20 PROCEDURE — 88304 TISSUE EXAM BY PATHOLOGIST: CPT | Mod: 26

## 2017-05-20 RX ORDER — ONDANSETRON 8 MG/1
4 TABLET, FILM COATED ORAL ONCE
Qty: 0 | Refills: 0 | Status: DISCONTINUED | OUTPATIENT
Start: 2017-05-20 | End: 2017-05-22

## 2017-05-20 RX ORDER — MEPERIDINE HYDROCHLORIDE 50 MG/ML
12.5 INJECTION INTRAMUSCULAR; INTRAVENOUS; SUBCUTANEOUS
Qty: 0 | Refills: 0 | Status: DISCONTINUED | OUTPATIENT
Start: 2017-05-20 | End: 2017-05-21

## 2017-05-20 RX ORDER — OXYCODONE HYDROCHLORIDE 5 MG/1
5 TABLET ORAL EVERY 4 HOURS
Qty: 0 | Refills: 0 | Status: DISCONTINUED | OUTPATIENT
Start: 2017-05-20 | End: 2017-05-22

## 2017-05-20 RX ORDER — HYDROMORPHONE HYDROCHLORIDE 2 MG/ML
0.5 INJECTION INTRAMUSCULAR; INTRAVENOUS; SUBCUTANEOUS
Qty: 0 | Refills: 0 | Status: DISCONTINUED | OUTPATIENT
Start: 2017-05-20 | End: 2017-05-21

## 2017-05-20 RX ADMIN — Medication 1 SPRAY(S): at 05:48

## 2017-05-20 RX ADMIN — PRAMIPEXOLE DIHYDROCHLORIDE 0.12 MILLIGRAM(S): 0.12 TABLET ORAL at 21:23

## 2017-05-20 RX ADMIN — Medication 0.5 MILLIGRAM(S): at 20:12

## 2017-05-20 RX ADMIN — Medication 12.5 MILLIGRAM(S): at 18:44

## 2017-05-20 RX ADMIN — PRAMIPEXOLE DIHYDROCHLORIDE 0.12 MILLIGRAM(S): 0.12 TABLET ORAL at 05:48

## 2017-05-20 RX ADMIN — Medication 100 MILLIGRAM(S): at 13:46

## 2017-05-20 RX ADMIN — Medication 1 APPLICATION(S): at 05:50

## 2017-05-20 RX ADMIN — Medication 100 MILLIGRAM(S): at 18:43

## 2017-05-20 RX ADMIN — SENNA PLUS 2 TABLET(S): 8.6 TABLET ORAL at 21:25

## 2017-05-20 RX ADMIN — PANTOPRAZOLE SODIUM 40 MILLIGRAM(S): 20 TABLET, DELAYED RELEASE ORAL at 06:39

## 2017-05-20 RX ADMIN — Medication 120 MILLIGRAM(S): at 06:37

## 2017-05-20 RX ADMIN — GABAPENTIN 300 MILLIGRAM(S): 400 CAPSULE ORAL at 13:46

## 2017-05-20 RX ADMIN — HYDROMORPHONE HYDROCHLORIDE 0.5 MILLIGRAM(S): 2 INJECTION INTRAMUSCULAR; INTRAVENOUS; SUBCUTANEOUS at 10:45

## 2017-05-20 RX ADMIN — ALBUTEROL 2 PUFF(S): 90 AEROSOL, METERED ORAL at 20:12

## 2017-05-20 RX ADMIN — Medication 40 MILLIGRAM(S): at 05:46

## 2017-05-20 RX ADMIN — HYDROMORPHONE HYDROCHLORIDE 0.5 MILLIGRAM(S): 2 INJECTION INTRAMUSCULAR; INTRAVENOUS; SUBCUTANEOUS at 09:49

## 2017-05-20 RX ADMIN — Medication 1 TABLET(S): at 13:49

## 2017-05-20 RX ADMIN — OXYCODONE HYDROCHLORIDE 5 MILLIGRAM(S): 5 TABLET ORAL at 10:45

## 2017-05-20 RX ADMIN — Medication 150 MILLIGRAM(S): at 18:44

## 2017-05-20 RX ADMIN — GABAPENTIN 600 MILLIGRAM(S): 400 CAPSULE ORAL at 21:23

## 2017-05-20 RX ADMIN — Medication 145 MILLIGRAM(S): at 13:45

## 2017-05-20 RX ADMIN — HYDROMORPHONE HYDROCHLORIDE 0.5 MILLIGRAM(S): 2 INJECTION INTRAMUSCULAR; INTRAVENOUS; SUBCUTANEOUS at 14:40

## 2017-05-20 RX ADMIN — HYDROMORPHONE HYDROCHLORIDE 0.5 MILLIGRAM(S): 2 INJECTION INTRAMUSCULAR; INTRAVENOUS; SUBCUTANEOUS at 21:28

## 2017-05-20 RX ADMIN — Medication 12.5 MILLIGRAM(S): at 05:48

## 2017-05-20 RX ADMIN — HYDROMORPHONE HYDROCHLORIDE 0.5 MILLIGRAM(S): 2 INJECTION INTRAMUSCULAR; INTRAVENOUS; SUBCUTANEOUS at 22:59

## 2017-05-20 RX ADMIN — HYDROMORPHONE HYDROCHLORIDE 0.5 MILLIGRAM(S): 2 INJECTION INTRAMUSCULAR; INTRAVENOUS; SUBCUTANEOUS at 09:56

## 2017-05-20 RX ADMIN — ISOSORBIDE MONONITRATE 120 MILLIGRAM(S): 60 TABLET, EXTENDED RELEASE ORAL at 13:50

## 2017-05-20 RX ADMIN — Medication 1 SPRAY(S): at 18:58

## 2017-05-20 RX ADMIN — SIMVASTATIN 10 MILLIGRAM(S): 20 TABLET, FILM COATED ORAL at 21:25

## 2017-05-20 RX ADMIN — PANTOPRAZOLE SODIUM 40 MILLIGRAM(S): 20 TABLET, DELAYED RELEASE ORAL at 18:43

## 2017-05-20 RX ADMIN — ALBUTEROL 2 PUFF(S): 90 AEROSOL, METERED ORAL at 13:58

## 2017-05-20 RX ADMIN — Medication 100 MILLIGRAM(S): at 05:46

## 2017-05-20 RX ADMIN — Medication 8 MILLIGRAM(S): at 21:24

## 2017-05-20 RX ADMIN — PRAMIPEXOLE DIHYDROCHLORIDE 0.12 MILLIGRAM(S): 0.12 TABLET ORAL at 13:50

## 2017-05-20 RX ADMIN — Medication 150 MILLIGRAM(S): at 05:45

## 2017-05-20 RX ADMIN — OXYCODONE HYDROCHLORIDE 5 MILLIGRAM(S): 5 TABLET ORAL at 09:43

## 2017-05-20 NOTE — PROGRESS NOTE ADULT - ASSESSMENT
# ARYA due to pre-renal azothemia with CKD stage 4 at baseline ( 2.0-2.5)  # Anemia due to acute blood loss anemia  # Sepsis? due to LE ulcers  # Multiple DVT on RUE/RLE with s/p IVC fileter  # HTN    5/15 MK  - ARYA/CKD , near his baseline when he is on no diuretics.  Will restart lasix at 40 mg iv daily and moniter response  - PVD with cellulitis with stasis: now improving in appearance, no angio for now as per dr dugan.  abx as per dr rashid  - Anemia with acute blood loss, h/h stable  - HYpernatremia: increase his intake of free water, chronically this range    5/16 SY  --ARYA/CKD   improved and stable.  Tolerated angiogram with stent without acute event.  No further studies planned at this point.  Lasix resumed.  Follow fluid status closely  --Hypernatremia : expect improvement with diuretics.  --PVD /LE edema and cellulitis.  Post Angiogram and stent placement,  Abtx completed.  Continue wound care.    5/17 MK  - ARYA/CKD now with stable paramenters.  change to po lasix   - Hypernatremia: moniter now on po lasix  - PVD with stasis ulcers with celllutits: possible debridement      5/18  Dr Pritchard recommendation of eschar debridement noted, R foot  pt doing well otherwise  creat down to 1.2 range still hypernatremic, chronic  on po  lasix    5/19 mk  - Stable CKD: will increse his lasix to 80 mg  - Chronic hypernatremia: moniter on increased po intake  - for debridement in am will await decision regarding timing of next angiogram    5/20 SY  --ARYA improved.  Positive CKD stable  --Chronic LE edema  Continue po lasix  --Post wound debridement.  Continue to follow  --Hypernatremia : chronic  encourage po fluid.

## 2017-05-20 NOTE — PROGRESS NOTE ADULT - SUBJECTIVE AND OBJECTIVE BOX
NEPHROLOGY INTERVAL HPI/OVERNIGHT EVENTS:  5/20 SY  post foot wound debridement,  Tolerated tx well.  No complications.   Resting comfortably.    81 y/o WM well known to me with CKD stage 4 at baseline with scr of 2.0-2.5 at baseline, transferred from SNF with worseing LE pain, and Altered MS.  Pt upon admission noted with Scr of 3.99 and weeping LE with hx of severe PVD on UNNA boots. Hgb was in 6. Currently in the process of receiving 4/4 PRBC with lasix in between.  UOP + with [place] catheter placed.    Hx of recent Dieulafoy bleed and planned for repeat EGD this afternoon.   Remains with altered MS    As per daughters, had been having variable renal function at facility and was given lasix with IVF alternating.      PAST MEDICAL & SURGICAL HISTORY:  -CKD stage 4 with scr of 2-2.5  -BPH (benign prostatic hypertrophy)  -Afib not on AC due to GI bleed  -Hyperlipemia  -Coronary artery disease  -Hypertension  -Dyspepsia  -COPD/SHAYY home 02 therapy, and treatment with BIPAP  -Pleural effusion, bilateral  -Peripheral edema  -Gout  -Spinal stenosis  -Hypercholesterolemia  -GERD (gastroesophageal reflux disease)  -CAD (coronary artery disease)  -S/P angioplasty with stent  -Cataract of left eye  -Prostate: Surgery green light procedure.      MEDICATIONS  (STANDING):  buDESOnide   0.5 milliGRAM(s) Respule 0.5milliGRAM(s) Inhalation two times a day  doxazosin 8milliGRAM(s) Oral at bedtime  isosorbide   mononitrate ER Tablet (IMDUR) 120milliGRAM(s) Oral daily  diltiazem   CD 120milliGRAM(s) Oral daily  gabapentin 300milliGRAM(s) Oral daily  allopurinol 100milliGRAM(s) Oral daily  fenofibrate Tablet 145milliGRAM(s) Oral daily  simvastatin 10milliGRAM(s) Oral at bedtime  pramipexole 0.125milliGRAM(s) Oral three times a day  ALBUTerol    90 MICROgram(s) HFA Inhaler 2Puff(s) Inhalation every 6 hours  fluticasone propionate 50 MICROgram(s)/spray Nasal Spray 1Spray(s) Both Nostrils two times a day  metoprolol 12.5milliGRAM(s) Oral two times a day  hydrALAZINE 150milliGRAM(s) Oral two times a day  lactobacillus acidophilus 1Tablet(s) Oral daily  gabapentin 600milliGRAM(s) Oral at bedtime  pantoprazole  Injectable 40milliGRAM(s) IV Push every 12 hours  furosemide    Tablet 40milliGRAM(s) Oral daily  docusate sodium 100milliGRAM(s) Oral two times a day  senna 2Tablet(s) Oral at bedtime    MEDICATIONS  (PRN):  aluminum hydroxide/magnesium hydroxide/simethicone Suspension 30milliLiter(s) Oral every 6 hours PRN Dyspepsia  HYDROmorphone  Injectable 0.5milliGRAM(s) IV Push every 4 hours PRN Severe Pain (7 - 10)  phenol 1.4% (CHLORASEPTIC) Oral Spray 2Spray(s) Topical three times a day PRN mouth  care  oxyCODONE  5 mG/acetaminophen 325 mG 1Tablet(s) Oral every 4 hours PRN Moderate Pain (4 - 6)  HYDROmorphone  Injectable 0.5milliGRAM(s) IV Push every 10 minutes PRN Severe Pain (7 - 10)  oxyCODONE IR 5milliGRAM(s) Oral every 4 hours PRN For moderate pain  ondansetron Injectable 4milliGRAM(s) IV Push once PRN Nausea and/or Vomiting  meperidine     Injectable 12.5milliGRAM(s) IV Push every 10 minutes PRN Shivering          Vital Signs Last 24 Hrs  T(C): 36.9, Max: 37.8 (05-20 @ 10:20)  T(F): 98.4, Max: 100 (05-20 @ 10:20)  HR: 77 (73 - 89)  BP: 154/42 (149/46 - 168/54)  BP(mean): --  RR: 18 (13 - 18)  SpO2: 96% (95% - 99%)  Daily     Daily     I & Os for current day (as of 05-20 @ 18:44)  =============================================  IN: 600 ml / OUT: 0 ml / NET: 600 ml      PHYSICAL EXAM:  Alert and appropriate  GENERAL: No distress  CHEST/LUNG: Clear to aus  HEART: S1S2 RRR  ABDOMEN: soft  EXTREMITIES: positive edema   SKIN:     LABS:                        10.0   5.1   )-----------( 305      ( 19 May 2017 06:53 )             31.6     05-19    150<H>  |  118<H>  |  69<H>  ----------------------------<  107<H>  4.6   |  24  |  1.28    Ca    8.0<L>      19 May 2017 06:53                  RADIOLOGY & ADDITIONAL TESTS:

## 2017-05-20 NOTE — PROGRESS NOTE ADULT - SUBJECTIVE AND OBJECTIVE BOX
CC: Anemia, LE wounds.     HPI: 82 year old male with history of CAD s/p stents (LAD, RCA bare metal around 9/16), A.Fib not on anticoagulation due to GI bleeding, Hypertension, COPD on home O2 2L, SHAYY on nocturnal BIPAP, ex-smoker (smoked 1ppd X 50 years, quit 22 years ago),  Chronic diastolic CHF, Hyperlipidemia, PVD, Iron deficiency anemia, Chronic back pain, Gout, BPH, CKD III with baseline Cr 2, recently admitted to  in 4/17 with anemia of GI bleeding, RLE and RUE DVTs, received pRBCs and IVC filter discharged to rehab with aspirin was sent to  ER on 5/4/17 for worsening anemia with Hb 6, worsening leg pain from ulcers and worsening renal function Cr 3.99. In ED, + guaiac.     Hospital course: prolonged including stabilization of suspected GIB with RBC transfusion and PPI BID. EGD on hold given ongoing LE wounds and angioplasty this admission. Patient underwent RLE stent and angioplasty with Vascular and prolonged IV Cefepime for wounds. HD stable.     5/20/17: Seen post op from RLE surgical debridement. No CP or SOB. Pain controlled currently.     ROS: stated above.     Vital Signs Last 24 Hrs  T(C): 36.9, Max: 37.8 (05-20 @ 10:20)  T(F): 98.4, Max: 100 (05-20 @ 10:20)  HR: 77 (73 - 89)  BP: 154/42 (138/48 - 168/54)  BP(mean): --  RR: 18 (13 - 18)  SpO2: 96% (95% - 99%)    Physical exam:  Appears chronically ill and obese male in NAD, awake and alert.   HEENT: PERRLA  Lungs- decrease air entry at bases, no active wheezing.   S1S2, normal rhthym  Abd- soft, NT, BS+, obese and obese.   Ext- dressing both legs,. + 2 LE edema. RLE in ace-bandage, notable swelling.    Neuro- AAOx3    LABS:                        10.0   5.1   )-----------( 305      ( 19 May 2017 06:53 )             31.6       05-19    150<H>  |  118<H>  |  69<H>  ----------------------------<  107<H>  4.6   |  24  |  1.28    Ca    8.0<L>      19 May 2017 06:53        5/14 CXR: Since May 12, 2017, unchanged small left pleural effusion with basilar   atelectasis.    MEDICATIONS  (STANDING):  buDESOnide   0.5 milliGRAM(s) Respule 0.5milliGRAM(s) Inhalation two times a day  doxazosin 8milliGRAM(s) Oral at bedtime  isosorbide   mononitrate ER Tablet (IMDUR) 120milliGRAM(s) Oral daily  diltiazem   CD 120milliGRAM(s) Oral daily  gabapentin 300milliGRAM(s) Oral daily  allopurinol 100milliGRAM(s) Oral daily  fenofibrate Tablet 145milliGRAM(s) Oral daily  simvastatin 10milliGRAM(s) Oral at bedtime  pramipexole 0.125milliGRAM(s) Oral three times a day  ALBUTerol    90 MICROgram(s) HFA Inhaler 2Puff(s) Inhalation every 6 hours  ammonium lactate 12% Lotion 1Application(s) Topical two times a day  fluticasone propionate 50 MICROgram(s)/spray Nasal Spray 1Spray(s) Both Nostrils two times a day  metoprolol 12.5milliGRAM(s) Oral two times a day  hydrALAZINE 150milliGRAM(s) Oral two times a day  lactobacillus acidophilus 1Tablet(s) Oral daily  gabapentin 600milliGRAM(s) Oral at bedtime  pantoprazole  Injectable 40milliGRAM(s) IV Push every 12 hours  furosemide    Tablet 40milliGRAM(s) Oral daily  docusate sodium 100milliGRAM(s) Oral two times a day  senna 2Tablet(s) Oral at bedtime    Assessment/Plan:   This is an 82 year old male with history of CAD s/p stents (LAD, RCA bare metal around 9/16), A.Fib not on anticoagulation due to GI bleeding, Hypertension, COPD on home O2 2L,   Chronic diastolic CHF, Hyperlipidemia, PVD, CKD III with baseline Cr 2 admitted for anemia and GIB now stable with ongoing LE wounds s/p angioplasty with vascular surgery on abx:    #Severe PAD.   - 05/11 endarterectomy of CFA plaque in an attempt of limb preservation.  05/08 angiogram + angioplasty.  stenting of right common iliac + external iliac arteries.  statin.   - 5/20: repeat RLE debridement of black ischar.   - on statin. No Asp/ plavix as yet given concern for GI bleed (no H&H stable for days) --> WILL DEFER to GI and Vascular regarding safety of starting anti-platelet therapy.  - Educated on incentive spirometry use post op.   - pain control, IV pain meds.     #chronic right LE stasis ulcers, dermatitis w/ cellulitis.    - S/p 2 weeks of IV Cefepime, completed on 5/16.     #GIB/acute blood loss anemia.  s/p 5 units PRBCs. No plans for EGD given ongoing resp issues on chronic O2.   Cont PPI BID per GI. Hb ~10. STABLE.   GI f/u appreciated  - daily CBC. To decide on starting antiplatelet therapy in the setting of LE stent.       #ARYA upon CKD.  Cr improved, back on Lasix --> switched to PO on 5/17.   Nephrology following.    #multiple DVT RUE/RLL.  s/p IVC Filter.    DVT ppx: no pharmacological ppx 2/2 GIB, no venodynes 2/2 LLE wounds. Will start mobilization as tolerated.     Dispo: remain inpatient on Med-surg, pain control. Bowel regimen.     Total time 35 mins. Discussed with patient and staff.

## 2017-05-20 NOTE — PROVIDER CONTACT NOTE (OTHER) - SITUATION
Pt assessed and complained of not feeling toes on right leg, burning sensation. Jerky movements, pt stated he was shaky.

## 2017-05-21 LAB
ANION GAP SERPL CALC-SCNC: 11 MMOL/L — SIGNIFICANT CHANGE UP (ref 5–17)
BUN SERPL-MCNC: 61 MG/DL — HIGH (ref 7–23)
CALCIUM SERPL-MCNC: 7.9 MG/DL — LOW (ref 8.5–10.1)
CHLORIDE SERPL-SCNC: 116 MMOL/L — HIGH (ref 96–108)
CO2 SERPL-SCNC: 23 MMOL/L — SIGNIFICANT CHANGE UP (ref 22–31)
CREAT SERPL-MCNC: 1.03 MG/DL — SIGNIFICANT CHANGE UP (ref 0.5–1.3)
GLUCOSE SERPL-MCNC: 99 MG/DL — SIGNIFICANT CHANGE UP (ref 70–99)
HCT VFR BLD CALC: 32.7 % — LOW (ref 39–50)
HGB BLD-MCNC: 9.9 G/DL — LOW (ref 13–17)
MCHC RBC-ENTMCNC: 28.3 PG — SIGNIFICANT CHANGE UP (ref 27–34)
MCHC RBC-ENTMCNC: 30.5 GM/DL — LOW (ref 32–36)
MCV RBC AUTO: 93 FL — SIGNIFICANT CHANGE UP (ref 80–100)
PLATELET # BLD AUTO: 286 K/UL — SIGNIFICANT CHANGE UP (ref 150–400)
POTASSIUM SERPL-MCNC: 3.9 MMOL/L — SIGNIFICANT CHANGE UP (ref 3.5–5.3)
POTASSIUM SERPL-SCNC: 3.9 MMOL/L — SIGNIFICANT CHANGE UP (ref 3.5–5.3)
RBC # BLD: 3.51 M/UL — LOW (ref 4.2–5.8)
RBC # FLD: 14.9 % — HIGH (ref 10.3–14.5)
SODIUM SERPL-SCNC: 150 MMOL/L — HIGH (ref 135–145)
WBC # BLD: 6.9 K/UL — SIGNIFICANT CHANGE UP (ref 3.8–10.5)
WBC # FLD AUTO: 6.9 K/UL — SIGNIFICANT CHANGE UP (ref 3.8–10.5)

## 2017-05-21 RX ORDER — FUROSEMIDE 40 MG
80 TABLET ORAL DAILY
Qty: 0 | Refills: 0 | Status: DISCONTINUED | OUTPATIENT
Start: 2017-05-21 | End: 2017-05-24

## 2017-05-21 RX ADMIN — Medication 0.5 MILLIGRAM(S): at 19:48

## 2017-05-21 RX ADMIN — Medication 1 SPRAY(S): at 06:42

## 2017-05-21 RX ADMIN — PRAMIPEXOLE DIHYDROCHLORIDE 0.12 MILLIGRAM(S): 0.12 TABLET ORAL at 06:35

## 2017-05-21 RX ADMIN — HYDROMORPHONE HYDROCHLORIDE 0.5 MILLIGRAM(S): 2 INJECTION INTRAMUSCULAR; INTRAVENOUS; SUBCUTANEOUS at 20:47

## 2017-05-21 RX ADMIN — Medication 120 MILLIGRAM(S): at 06:36

## 2017-05-21 RX ADMIN — Medication 150 MILLIGRAM(S): at 06:36

## 2017-05-21 RX ADMIN — Medication 0.5 MILLIGRAM(S): at 09:58

## 2017-05-21 RX ADMIN — ALBUTEROL 2 PUFF(S): 90 AEROSOL, METERED ORAL at 10:00

## 2017-05-21 RX ADMIN — Medication 150 MILLIGRAM(S): at 17:43

## 2017-05-21 RX ADMIN — GABAPENTIN 300 MILLIGRAM(S): 400 CAPSULE ORAL at 13:08

## 2017-05-21 RX ADMIN — Medication 12.5 MILLIGRAM(S): at 06:35

## 2017-05-21 RX ADMIN — ISOSORBIDE MONONITRATE 120 MILLIGRAM(S): 60 TABLET, EXTENDED RELEASE ORAL at 13:11

## 2017-05-21 RX ADMIN — Medication 80 MILLIGRAM(S): at 13:08

## 2017-05-21 RX ADMIN — SIMVASTATIN 10 MILLIGRAM(S): 20 TABLET, FILM COATED ORAL at 21:00

## 2017-05-21 RX ADMIN — PANTOPRAZOLE SODIUM 40 MILLIGRAM(S): 20 TABLET, DELAYED RELEASE ORAL at 17:43

## 2017-05-21 RX ADMIN — GABAPENTIN 600 MILLIGRAM(S): 400 CAPSULE ORAL at 21:01

## 2017-05-21 RX ADMIN — Medication 100 MILLIGRAM(S): at 13:10

## 2017-05-21 RX ADMIN — HYDROMORPHONE HYDROCHLORIDE 0.5 MILLIGRAM(S): 2 INJECTION INTRAMUSCULAR; INTRAVENOUS; SUBCUTANEOUS at 15:43

## 2017-05-21 RX ADMIN — Medication 145 MILLIGRAM(S): at 13:11

## 2017-05-21 RX ADMIN — Medication 1 TABLET(S): at 13:08

## 2017-05-21 RX ADMIN — Medication 100 MILLIGRAM(S): at 17:43

## 2017-05-21 RX ADMIN — PRAMIPEXOLE DIHYDROCHLORIDE 0.12 MILLIGRAM(S): 0.12 TABLET ORAL at 13:09

## 2017-05-21 RX ADMIN — Medication 40 MILLIGRAM(S): at 06:35

## 2017-05-21 RX ADMIN — HYDROMORPHONE HYDROCHLORIDE 0.5 MILLIGRAM(S): 2 INJECTION INTRAMUSCULAR; INTRAVENOUS; SUBCUTANEOUS at 21:00

## 2017-05-21 RX ADMIN — ALBUTEROL 2 PUFF(S): 90 AEROSOL, METERED ORAL at 19:48

## 2017-05-21 RX ADMIN — Medication 8 MILLIGRAM(S): at 21:01

## 2017-05-21 RX ADMIN — Medication 1 SPRAY(S): at 17:43

## 2017-05-21 RX ADMIN — Medication 12.5 MILLIGRAM(S): at 17:43

## 2017-05-21 RX ADMIN — HYDROMORPHONE HYDROCHLORIDE 0.5 MILLIGRAM(S): 2 INJECTION INTRAMUSCULAR; INTRAVENOUS; SUBCUTANEOUS at 16:47

## 2017-05-21 RX ADMIN — SENNA PLUS 2 TABLET(S): 8.6 TABLET ORAL at 21:00

## 2017-05-21 RX ADMIN — Medication 100 MILLIGRAM(S): at 06:35

## 2017-05-21 RX ADMIN — PANTOPRAZOLE SODIUM 40 MILLIGRAM(S): 20 TABLET, DELAYED RELEASE ORAL at 06:36

## 2017-05-21 RX ADMIN — PRAMIPEXOLE DIHYDROCHLORIDE 0.12 MILLIGRAM(S): 0.12 TABLET ORAL at 21:00

## 2017-05-21 NOTE — PROGRESS NOTE ADULT - ASSESSMENT
anemia, likely multifactorial  I suspect that there is a component of GI bleeding, appears stabilized on PPI  and hct stable  but concerned with repeat melena.  this may be old  will check HCT and serial H H   mild dec likely with debridment    He has received multiple units of packed red blood cells     protonix po bid   Serial H&H'    lower extremity DVT, status post IVC filter    Sepsis likely secondary to lower extremity cellulitis, improved    Advanced COPD and obstructive sleep apnea reviewed.    DW  Dr Henry, [facility] risk bleed with anti plt and S/P debridment of foot

## 2017-05-21 NOTE — PROGRESS NOTE ADULT - SUBJECTIVE AND OBJECTIVE BOX
pain manageable; sitting in chair    MEDICATIONS  (STANDING):  buDESOnide   0.5 milliGRAM(s) Respule 0.5milliGRAM(s) Inhalation two times a day  doxazosin 8milliGRAM(s) Oral at bedtime  isosorbide   mononitrate ER Tablet (IMDUR) 120milliGRAM(s) Oral daily  diltiazem   CD 120milliGRAM(s) Oral daily  gabapentin 300milliGRAM(s) Oral daily  allopurinol 100milliGRAM(s) Oral daily  fenofibrate Tablet 145milliGRAM(s) Oral daily  simvastatin 10milliGRAM(s) Oral at bedtime  pramipexole 0.125milliGRAM(s) Oral three times a day  ALBUTerol    90 MICROgram(s) HFA Inhaler 2Puff(s) Inhalation every 6 hours  fluticasone propionate 50 MICROgram(s)/spray Nasal Spray 1Spray(s) Both Nostrils two times a day  metoprolol 12.5milliGRAM(s) Oral two times a day  hydrALAZINE 150milliGRAM(s) Oral two times a day  lactobacillus acidophilus 1Tablet(s) Oral daily  gabapentin 600milliGRAM(s) Oral at bedtime  pantoprazole  Injectable 40milliGRAM(s) IV Push every 12 hours  docusate sodium 100milliGRAM(s) Oral two times a day  senna 2Tablet(s) Oral at bedtime  furosemide    Tablet 80milliGRAM(s) Oral daily    MEDICATIONS  (PRN):  aluminum hydroxide/magnesium hydroxide/simethicone Suspension 30milliLiter(s) Oral every 6 hours PRN Dyspepsia  HYDROmorphone  Injectable 0.5milliGRAM(s) IV Push every 4 hours PRN Severe Pain (7 - 10)  phenol 1.4% (CHLORASEPTIC) Oral Spray 2Spray(s) Topical three times a day PRN mouth  care  oxyCODONE  5 mG/acetaminophen 325 mG 1Tablet(s) Oral every 4 hours PRN Moderate Pain (4 - 6)  HYDROmorphone  Injectable 0.5milliGRAM(s) IV Push every 10 minutes PRN Severe Pain (7 - 10)  oxyCODONE IR 5milliGRAM(s) Oral every 4 hours PRN For moderate pain  ondansetron Injectable 4milliGRAM(s) IV Push once PRN Nausea and/or Vomiting  meperidine     Injectable 12.5milliGRAM(s) IV Push every 10 minutes PRN Shivering      Allergies    No Known Allergies    Intolerances        Flatus: [ ] YES [ ] NO             Bowel Movement: [ ] YES [ ] NO  Pain (0-10):            Pain Control Adequate: [ ] YES [ ] NO  Nausea: [ ] YES [ ] NO            Vomiting: [ ] YES [ ] NO  Diarrhea: [ ] YES [ ] NO         Constipation: [ ] YES [ ] NO     Chest Pain: [ ] YES [ ] NO    SOB:  [ ] YES [ ] NO    Vital Signs Last 24 Hrs  T(C): 37.2, Max: 37.2 (05-21 @ 05:49)  T(F): 98.9, Max: 98.9 (05-21 @ 05:49)  HR: 77 (76 - 93)  BP: 144/38 (144/38 - 165/40)  BP(mean): --  RR: 18 (18 - 18)  SpO2: 94% (94% - 97%)    I&O's Summary    I & Os for current day (as of 21 May 2017 10:47)  =============================================  IN: 600 ml / OUT: 0 ml / NET: 600 ml      Physical Exam:  General: NAD, resting comfortably  Pulmonary: normal resp effort, CTA-B  Cardiovascular: NSR  Abdominal: soft, NT/ND  Extremities: WWP, normal strength  Neuro: A/O x 3, CNs II-XII grossly intact, normal motor/sensation, no focal deficits  Pulses:   Right:                                                                          Left:  R leg dressing clean and dry; toes pink appearing  LABS:                        9.9    6.9   )-----------( 286      ( 21 May 2017 08:06 )             32.7     05-21    150<H>  |  116<H>  |  61<H>  ----------------------------<  99  3.9   |  23  |  1.03    Ca    7.9<L>      21 May 2017 08:06            CAPILLARY BLOOD GLUCOSE      RADIOLOGY & ADDITIONAL TESTS:

## 2017-05-21 NOTE — PROGRESS NOTE ADULT - SUBJECTIVE AND OBJECTIVE BOX
Patient is a 82y old  Male who presents with a chief complaint of Worsening leg pain, anemia, ARYA sent from Rehab (04 May 2017 15:56)      HPI:  82 year old male with history of CAD s/p stents (LAD, RCA bare metal around 9/16), A.Fib not on anticoagulation due to GI bleeding, Hypertension, COPD on home O2 2L, SHAYY on nocturnal BIPAP, ex-smoker (smoked 1ppd X 50 years, quit 22 years ago),  Chronic diastolic CHF, Hyperlipidemia, PVD, Iron deficiency anemia, Chronic back pain, Gout, BPH, CKD III with baseline Cr 2, recently admitted to  in 4/17 with anemia of GI bleeding, RLE and RUE DVTs, received pRBCs and IVC filter discharged to rehab with aspirin was sent to  ER on 5/4/17 for worsening anemia with Hb 6, worsening leg pain from ulcers and worsening renal function Cr 3.99.    Pt seen bed side.  Not well oriented currently after receiving Dilaudid for pain in ER.  As per daughters, pt was walking with a walker at rehab and was doing better.  Pt was found to have tarry black stools in ER, guaic positive. (04 May 2017 15:56)      ptcomf  S/P debridment of R foot  mild pain in foot  reflux with laying flat  neg cp    DW Dr Clement      PAST MEDICAL & SURGICAL HISTORY:  Sepsis, due to unspecified organism: 2/2 poorly healing wounds b/l  BPH (benign prostatic hypertrophy)  Hyperlipemia  Coronary artery disease  Hypertension  Dyspepsia: On moderate exertion.  Sleep apnea, obstructive: Requires home 02 therapy, and treatment with BIPAP  Atelectasis  Pleural effusion, bilateral  Respiratory failure  Peripheral edema  CRI (chronic renal insufficiency)  Gout  CRF (chronic renal failure)  Benign prostatic hypertrophy  Spinal stenosis  Hypercholesterolemia  GERD (gastroesophageal reflux disease)  CAD (coronary artery disease)  Hypertension  S/P angioplasty with stent  Cataract of left eye  Prostate: Surgery green light procedure.  S/P rotator cuff surgery: Right  S/P angioplasty  Rotator cuff tear, right: repair      MEDICATIONS  (STANDING):  buDESOnide   0.5 milliGRAM(s) Respule 0.5milliGRAM(s) Inhalation two times a day  doxazosin 8milliGRAM(s) Oral at bedtime  isosorbide   mononitrate ER Tablet (IMDUR) 120milliGRAM(s) Oral daily  diltiazem   CD 120milliGRAM(s) Oral daily  gabapentin 300milliGRAM(s) Oral daily  allopurinol 100milliGRAM(s) Oral daily  fenofibrate Tablet 145milliGRAM(s) Oral daily  simvastatin 10milliGRAM(s) Oral at bedtime  pramipexole 0.125milliGRAM(s) Oral three times a day  ALBUTerol    90 MICROgram(s) HFA Inhaler 2Puff(s) Inhalation every 6 hours  fluticasone propionate 50 MICROgram(s)/spray Nasal Spray 1Spray(s) Both Nostrils two times a day  metoprolol 12.5milliGRAM(s) Oral two times a day  hydrALAZINE 150milliGRAM(s) Oral two times a day  lactobacillus acidophilus 1Tablet(s) Oral daily  gabapentin 600milliGRAM(s) Oral at bedtime  pantoprazole  Injectable 40milliGRAM(s) IV Push every 12 hours  docusate sodium 100milliGRAM(s) Oral two times a day  senna 2Tablet(s) Oral at bedtime  furosemide    Tablet 80milliGRAM(s) Oral daily    MEDICATIONS  (PRN):  aluminum hydroxide/magnesium hydroxide/simethicone Suspension 30milliLiter(s) Oral every 6 hours PRN Dyspepsia  HYDROmorphone  Injectable 0.5milliGRAM(s) IV Push every 4 hours PRN Severe Pain (7 - 10)  phenol 1.4% (CHLORASEPTIC) Oral Spray 2Spray(s) Topical three times a day PRN mouth  care  oxyCODONE  5 mG/acetaminophen 325 mG 1Tablet(s) Oral every 4 hours PRN Moderate Pain (4 - 6)  oxyCODONE IR 5milliGRAM(s) Oral every 4 hours PRN For moderate pain  ondansetron Injectable 4milliGRAM(s) IV Push once PRN Nausea and/or Vomiting      Allergies    No Known Allergies    Intolerances        SOCIAL HISTORY:unchanged    FAMILY HISTORY:  No pertinent family history in first degree relatives      REVIEW OF SYSTEMS:    CONSTITUTIONAL: No weakness, fevers or chills  EYES/ENT: No visual changes;  No vertigo or throat pain   NECK: No pain or stiffness  RESPIRATORY: No cough, wheezing, hemoptysis; No shortness of breath  CARDIOVASCULAR: No chest pain or palpitations  GENITOURINARY: No dysuria, frequency or hematuria  NEUROLOGICAL: No numbness or weakness  SKIN: No itching, burning, rashes, or lesions   All other review of systems is negative unless indicated above.    Vital Signs Last 24 Hrs  T(C): 36.7, Max: 37.2 (05-21 @ 05:49)  T(F): 98, Max: 98.9 (05-21 @ 05:49)  HR: 62 (62 - 93)  BP: 126/68 (126/68 - 165/40)  BP(mean): --  RR: 20 (18 - 20)  SpO2: 100% (94% - 100%)    PHYSICAL EXAM:    Constitutional: NAD, well-developed  HEENT: EOMI, throat clear  Neck: No LAD, supple  Respiratory: CTA and P  Cardiovascular: S1 and S2, RRR, no M  Gastrointestinal: BS+, soft, NT/ND, neg HSM,  Extremities: No peripheral edema, neg clubing, cyanosis  Vascular: 2+ peripheral pulses  Neurological: A/O x 3, no focal deficits  Psychiatric: Normal mood, normal affect  Skin: No rashes    LABS:  CBC Full  -  ( 21 May 2017 08:06 )  WBC Count : 6.9 K/uL  Hemoglobin : 9.9 g/dL  Hematocrit : 32.7 %  Platelet Count - Automated : 286 K/uL  Mean Cell Volume : 93.0 fl  Mean Cell Hemoglobin : 28.3 pg  Mean Cell Hemoglobin Concentration : 30.5 gm/dL  Auto Neutrophil # : x  Auto Lymphocyte # : x  Auto Monocyte # : x  Auto Eosinophil # : x  Auto Basophil # : x  Auto Neutrophil % : x  Auto Lymphocyte % : x  Auto Monocyte % : x  Auto Eosinophil % : x  Auto Basophil % : x    05-21    150<H>  |  116<H>  |  61<H>  ----------------------------<  99  3.9   |  23  |  1.03    Ca    7.9<L>      21 May 2017 08:06              RADIOLOGY & ADDITIONAL STUDIES:

## 2017-05-21 NOTE — PROGRESS NOTE ADULT - SUBJECTIVE AND OBJECTIVE BOX
CC: Anemia, LE wounds.     HPI: 82 year old male with history of CAD s/p stents (LAD, RCA bare metal around 9/16), A.Fib not on anticoagulation due to GI bleeding, Hypertension, COPD on home O2 2L, SHAYY on nocturnal BIPAP, ex-smoker (smoked 1ppd X 50 years, quit 22 years ago),  Chronic diastolic CHF, Hyperlipidemia, PVD, Iron deficiency anemia, Chronic back pain, Gout, BPH, CKD III with baseline Cr 2, recently admitted to  in 4/17 with anemia of GI bleeding, RLE and RUE DVTs, received pRBCs and IVC filter discharged to rehab with aspirin was sent to  ER on 5/4/17 for worsening anemia with Hb 6, worsening leg pain from ulcers and worsening renal function Cr 3.99. In ED, + guaiac.     Hospital course: prolonged including stabilization of suspected GIB with RBC transfusion and PPI BID. EGD on hold given ongoing LE wounds and angioplasty this admission. Patient underwent RLE stent and angioplasty with Vascular and prolonged IV Cefepime for wounds. HD stable.     5/20/17: Seen post op from RLE surgical debridement. No CP or SOB. Pain controlled currently.     5/21/17: Feels ok, no lower extremity pain. Was sitting in chair earlier. Need to clarify weight bearing status with Vascular.     ROS: stated above.     Vital Signs Last 24 Hrs  T(C): 36.7, Max: 37.2 (05-21 @ 05:49)  T(F): 98, Max: 98.9 (05-21 @ 05:49)  HR: 62 (62 - 93)  BP: 126/68 (126/68 - 165/40)  BP(mean): --  RR: 20 (18 - 20)  SpO2: 100% (94% - 100%)    Physical exam:  Appears chronically ill and obese male in NAD, awake and alert.   HEENT: PERRLA  Lungs- decrease air entry at bases, no active wheezing.   S1S2, normal rhthym  Abd- soft, NT, BS+, obese and obese.   Ext- dressing both legs,. + 2 LE edema. RLE in ace-bandage, notable swelling.    Neuro- AAOx3    LABS:                        9.9    6.9   )-----------( 286      ( 21 May 2017 08:06 )             32.7                           10.0   5.1   )-----------( 305      ( 19 May 2017 06:53 )             31.6       05-21    150<H>  |  116<H>  |  61<H>  ----------------------------<  99  3.9   |  23  |  1.03    Ca    7.9<L>      21 May 2017 08:06    Ca    8.0<L>      19 May 2017 06:53        5/14 CXR: Since May 12, 2017, unchanged small left pleural effusion with basilar   atelectasis.    MEDICATIONS  (STANDING):  buDESOnide   0.5 milliGRAM(s) Respule 0.5milliGRAM(s) Inhalation two times a day  doxazosin 8milliGRAM(s) Oral at bedtime  isosorbide   mononitrate ER Tablet (IMDUR) 120milliGRAM(s) Oral daily  diltiazem   CD 120milliGRAM(s) Oral daily  gabapentin 300milliGRAM(s) Oral daily  allopurinol 100milliGRAM(s) Oral daily  fenofibrate Tablet 145milliGRAM(s) Oral daily  simvastatin 10milliGRAM(s) Oral at bedtime  pramipexole 0.125milliGRAM(s) Oral three times a day  ALBUTerol    90 MICROgram(s) HFA Inhaler 2Puff(s) Inhalation every 6 hours  ammonium lactate 12% Lotion 1Application(s) Topical two times a day  fluticasone propionate 50 MICROgram(s)/spray Nasal Spray 1Spray(s) Both Nostrils two times a day  metoprolol 12.5milliGRAM(s) Oral two times a day  hydrALAZINE 150milliGRAM(s) Oral two times a day  lactobacillus acidophilus 1Tablet(s) Oral daily  gabapentin 600milliGRAM(s) Oral at bedtime  pantoprazole  Injectable 40milliGRAM(s) IV Push every 12 hours  furosemide    Tablet 40milliGRAM(s) Oral daily  docusate sodium 100milliGRAM(s) Oral two times a day  senna 2Tablet(s) Oral at bedtime    Assessment/Plan:   This is an 82 year old male with history of CAD s/p stents (LAD, RCA bare metal around 9/16), A.Fib not on anticoagulation due to GI bleeding, Hypertension, COPD on home O2 2L,   Chronic diastolic CHF, Hyperlipidemia, PVD, CKD III with baseline Cr 2 admitted for anemia and GIB now stable with ongoing LE wounds s/p angioplasty with vascular surgery completed abx:     #Severe PAD.   - 05/11 endarterectomy of CFA plaque in an attempt of limb preservation.  05/08 angiogram + angioplasty.  stenting of right common iliac + external iliac arteries.  statin.   - 5/20: repeat RLE debridement of black ischar.   - on statin. No Asp/ plavix as yet given concern for GI bleed (no H&H stable for days) --> WILL DEFER to GI and Vascular regarding safety of starting anti-platelet therapy.  - Educated on incentive spirometry use post op.   - pain control, IV pain meds.   - awaiting clarification of weight bearing status. PT. Will need rehab.     #chronic right LE stasis ulcers, dermatitis w/ cellulitis.    - S/p 2 weeks of IV Cefepime, completed on 5/16.     #GIB/acute blood loss anemia.  s/p 5 units PRBCs. No plans for EGD given ongoing resp issues on chronic O2.   Cont PPI BID per GI. Hb ~10. STABLE.   GI f/u appreciated  - daily CBC. To decide on starting antiplatelet therapy in the setting of LE stent.       #ARYA upon CKD.  Cr improved, back on Lasix --> switched to PO on 5/17.   Nephrology following.    # Chronic Hypernatremia - oral intake encouraged w/ free water.     #multiple DVT RUE/RLL.  s/p IVC Filter.    DVT ppx: no pharmacological ppx 2/2 GIB, no venodynes 2/2 LLE wounds. Will start mobilization as tolerated.     Dispo: remain inpatient on Med-surg, pain control. Bowel regimen.     Total time 35 mins. Discussed with patient and staff.

## 2017-05-21 NOTE — PROGRESS NOTE ADULT - ASSESSMENT
# ARYA due to pre-renal azothemia with CKD stage 4 at baseline ( 2.0-2.5)  # Anemia due to acute blood loss anemia  # Sepsis? due to LE ulcers  # Multiple DVT on RUE/RLE with s/p IVC fileter  # HTN  5/15 MK  - ARYA/CKD , near his baseline when he is on no diuretics.  Will restart lasix at 40 mg iv daily and moniter response  - PVD with cellulitis with stasis: now improving in appearance, no angio for now as per dr dugan.  abx as per dr rashid  - Anemia with acute blood loss, h/h stable  - HYpernatremia: increase his intake of free water, chronically this range  5/16 SY  --ARYA/CKD   improved and stable.  Tolerated angiogram with stent without acute event.  No further studies planned at this point.  Lasix resumed.  Follow fluid status closely  --Hypernatremia : expect improvement with diuretics.  --PVD /LE edema and cellulitis.  Post Angiogram and stent placement,  Abtx completed.  Continue wound care.  5/17 MK  - ARYA/CKD now with stable paramenters.  change to po lasix   - Hypernatremia: moniter now on po lasix  - PVD with stasis ulcers with celllutits: possible debridement  5/18  Dr Pritchard recommendation of eschar debridement noted, R foot  pt doing well otherwise  creat down to 1.2 range still hypernatremic, chronic  on po  lasix  5/19 mk  - Stable CKD: will increse his lasix to 80 mg  - Chronic hypernatremia: moniter on increased po intake  - for debridement in am will await decision regarding timing of next angiogram  5/20 SY  --ARYA improved.  Positive CKD stable  --Chronic LE edema  Continue po lasix  --Post wound debridement.  Continue to follow  --Hypernatremia : chronic  encourage po fluid.  5/21 MK  - resolved ARYA with CKD at baseline  - LE edema; will increase lasix  - s/p debridement  - chronic hypernatremia: encourage po fluid

## 2017-05-22 RX ORDER — OXYCODONE HYDROCHLORIDE 5 MG/1
10 TABLET ORAL EVERY 4 HOURS
Qty: 0 | Refills: 0 | Status: DISCONTINUED | OUTPATIENT
Start: 2017-05-22 | End: 2017-05-24

## 2017-05-22 RX ADMIN — SENNA PLUS 2 TABLET(S): 8.6 TABLET ORAL at 21:32

## 2017-05-22 RX ADMIN — GABAPENTIN 300 MILLIGRAM(S): 400 CAPSULE ORAL at 13:47

## 2017-05-22 RX ADMIN — ALBUTEROL 2 PUFF(S): 90 AEROSOL, METERED ORAL at 19:39

## 2017-05-22 RX ADMIN — PANTOPRAZOLE SODIUM 40 MILLIGRAM(S): 20 TABLET, DELAYED RELEASE ORAL at 05:41

## 2017-05-22 RX ADMIN — Medication 145 MILLIGRAM(S): at 17:56

## 2017-05-22 RX ADMIN — OXYCODONE HYDROCHLORIDE 10 MILLIGRAM(S): 5 TABLET ORAL at 21:45

## 2017-05-22 RX ADMIN — GABAPENTIN 600 MILLIGRAM(S): 400 CAPSULE ORAL at 21:32

## 2017-05-22 RX ADMIN — PRAMIPEXOLE DIHYDROCHLORIDE 0.12 MILLIGRAM(S): 0.12 TABLET ORAL at 13:47

## 2017-05-22 RX ADMIN — Medication 80 MILLIGRAM(S): at 05:41

## 2017-05-22 RX ADMIN — PRAMIPEXOLE DIHYDROCHLORIDE 0.12 MILLIGRAM(S): 0.12 TABLET ORAL at 21:32

## 2017-05-22 RX ADMIN — ALBUTEROL 2 PUFF(S): 90 AEROSOL, METERED ORAL at 14:11

## 2017-05-22 RX ADMIN — Medication 12.5 MILLIGRAM(S): at 17:58

## 2017-05-22 RX ADMIN — Medication 1 TABLET(S): at 13:47

## 2017-05-22 RX ADMIN — Medication 1 SPRAY(S): at 05:50

## 2017-05-22 RX ADMIN — SIMVASTATIN 10 MILLIGRAM(S): 20 TABLET, FILM COATED ORAL at 21:34

## 2017-05-22 RX ADMIN — Medication 12.5 MILLIGRAM(S): at 05:42

## 2017-05-22 RX ADMIN — PRAMIPEXOLE DIHYDROCHLORIDE 0.12 MILLIGRAM(S): 0.12 TABLET ORAL at 05:42

## 2017-05-22 RX ADMIN — Medication 100 MILLIGRAM(S): at 05:41

## 2017-05-22 RX ADMIN — ISOSORBIDE MONONITRATE 120 MILLIGRAM(S): 60 TABLET, EXTENDED RELEASE ORAL at 13:49

## 2017-05-22 RX ADMIN — Medication 100 MILLIGRAM(S): at 13:48

## 2017-05-22 RX ADMIN — Medication 0.5 MILLIGRAM(S): at 19:49

## 2017-05-22 RX ADMIN — Medication 150 MILLIGRAM(S): at 05:43

## 2017-05-22 RX ADMIN — PANTOPRAZOLE SODIUM 40 MILLIGRAM(S): 20 TABLET, DELAYED RELEASE ORAL at 18:03

## 2017-05-22 RX ADMIN — Medication 0.5 MILLIGRAM(S): at 08:06

## 2017-05-22 RX ADMIN — Medication 120 MILLIGRAM(S): at 05:42

## 2017-05-22 RX ADMIN — Medication 150 MILLIGRAM(S): at 17:58

## 2017-05-22 RX ADMIN — HYDROMORPHONE HYDROCHLORIDE 0.5 MILLIGRAM(S): 2 INJECTION INTRAMUSCULAR; INTRAVENOUS; SUBCUTANEOUS at 08:41

## 2017-05-22 RX ADMIN — Medication 1 SPRAY(S): at 17:55

## 2017-05-22 RX ADMIN — ALBUTEROL 2 PUFF(S): 90 AEROSOL, METERED ORAL at 08:08

## 2017-05-22 RX ADMIN — Medication 8 MILLIGRAM(S): at 21:37

## 2017-05-22 NOTE — PROGRESS NOTE ADULT - SUBJECTIVE AND OBJECTIVE BOX
no fever or chills; pain still significant R foot wound    MEDICATIONS  (STANDING):  buDESOnide   0.5 milliGRAM(s) Respule 0.5milliGRAM(s) Inhalation two times a day  doxazosin 8milliGRAM(s) Oral at bedtime  isosorbide   mononitrate ER Tablet (IMDUR) 120milliGRAM(s) Oral daily  diltiazem   CD 120milliGRAM(s) Oral daily  gabapentin 300milliGRAM(s) Oral daily  allopurinol 100milliGRAM(s) Oral daily  fenofibrate Tablet 145milliGRAM(s) Oral daily  simvastatin 10milliGRAM(s) Oral at bedtime  pramipexole 0.125milliGRAM(s) Oral three times a day  ALBUTerol    90 MICROgram(s) HFA Inhaler 2Puff(s) Inhalation every 6 hours  fluticasone propionate 50 MICROgram(s)/spray Nasal Spray 1Spray(s) Both Nostrils two times a day  metoprolol 12.5milliGRAM(s) Oral two times a day  hydrALAZINE 150milliGRAM(s) Oral two times a day  lactobacillus acidophilus 1Tablet(s) Oral daily  gabapentin 600milliGRAM(s) Oral at bedtime  pantoprazole  Injectable 40milliGRAM(s) IV Push every 12 hours  docusate sodium 100milliGRAM(s) Oral two times a day  senna 2Tablet(s) Oral at bedtime  furosemide    Tablet 80milliGRAM(s) Oral daily    MEDICATIONS  (PRN):  aluminum hydroxide/magnesium hydroxide/simethicone Suspension 30milliLiter(s) Oral every 6 hours PRN Dyspepsia  HYDROmorphone  Injectable 0.5milliGRAM(s) IV Push every 4 hours PRN Severe Pain (7 - 10)  phenol 1.4% (CHLORASEPTIC) Oral Spray 2Spray(s) Topical three times a day PRN mouth  care  oxyCODONE  5 mG/acetaminophen 325 mG 1Tablet(s) Oral every 4 hours PRN Moderate Pain (4 - 6)      Allergies    No Known Allergies    Intolerances        Flatus: [ ] YES [ ] NO             Bowel Movement: [ ] YES [ ] NO  Pain (0-10):            Pain Control Adequate: [ ] YES [ ] NO  Nausea: [ ] YES [ ] NO            Vomiting: [ ] YES [ ] NO  Diarrhea: [ ] YES [ ] NO         Constipation: [ ] YES [ ] NO     Chest Pain: [ ] YES [ ] NO    SOB:  [ ] YES [ ] NO    Vital Signs Last 24 Hrs  T(C): 37.1, Max: 37.6 (05-21 @ 20:42)  T(F): 98.7, Max: 99.6 (05-21 @ 20:42)  HR: 88 (77 - 88)  BP: 166/50 (154/47 - 166/50)  BP(mean): --  RR: 18 (18 - 18)  SpO2: 93% (93% - 99%)    I&O's Summary  I & Os for 24h ending 22 May 2017 07:00  =============================================  IN: 0 ml / OUT: 700 ml / NET: -700 ml    I & Os for current day (as of 22 May 2017 11:27)  =============================================  IN: 240 ml / OUT: 200 ml / NET: 40 ml      Physical Exam:  General: NAD, resting comfortably  Pulmonary: normal resp effort, CTA-B  Cardiovascular: NSR  Abdominal: soft, NT/ND  Extremities: WWP, normal strength  Neuro: A/O x 3, CNs II-XII grossly intact, normal motor/sensation, no focal deficits  Pulses:   Right:                                                                          Left:  R groin with macerated supf tissue but no expressable purulence; debrided areas of leg and foot are viable and pink suggestive of adequate perfusion    LABS:                        9.9    6.9   )-----------( 286      ( 21 May 2017 08:06 )             32.7     05-21    150<H>  |  116<H>  |  61<H>  ----------------------------<  99  3.9   |  23  |  1.03    Ca    7.9<L>      21 May 2017 08:06            CAPILLARY BLOOD GLUCOSE      RADIOLOGY & ADDITIONAL TESTS:

## 2017-05-22 NOTE — PROGRESS NOTE ADULT - SUBJECTIVE AND OBJECTIVE BOX
Patient is a 82y old  Male who presents with a chief complaint of Worsening leg pain, anemia, ARYA sent from Rehab (04 May 2017 15:56)      HPI:  82 year old male with history of CAD s/p stents (LAD, RCA bare metal around 9/16), A.Fib not on anticoagulation due to GI bleeding, Hypertension, COPD on home O2 2L, SHAYY on nocturnal BIPAP, ex-smoker (smoked 1ppd X 50 years, quit 22 years ago),  Chronic diastolic CHF, Hyperlipidemia, PVD, Iron deficiency anemia, Chronic back pain, Gout, BPH, CKD III with baseline Cr 2, recently admitted to  in 4/17 with anemia of GI bleeding, RLE and RUE DVTs, received pRBCs and IVC filter discharged to rehab with aspirin was sent to  ER on 5/4/17 for worsening anemia with Hb 6, worsening leg pain from ulcers and worsening renal function Cr 3.99.    Pt seen bed side.  Not well oriented currently after receiving Dilaudid for pain in ER.  As per daughters, pt was walking with a walker at rehab and was doing better.  Pt was found to have tarry black stools in ER, guaic positive. (04 May 2017 15:56)    data rev with pt's RN and DTR at bedside today  tolerated his vascular surgery well , extubated and is having bkfst in bed this am  no cp  no sig cough or wheeze  uses his biapp at night    5/13  needed diuresis yesterday  feels better today  DTr at bedside  uses incentive spirometry  no cp  5/16  pt's dtr at bedside  feels ok  no breathing issues  Dr Clement's eval in progress and may need repeat OR  5/18  seen and eval on 5 [place]  breathing is ok  data rev with Dr Clement as well this am  pt may need debridement surgery in 1-2 days  no active pulm sxs now  5/19  feels ok  needs Or for debridement in am  data rev with Dr Clement  5/22  tiolerated his debridement  no active pulm sxs  PAST MEDICAL & SURGICAL HISTORY:  Sepsis, due to unspecified organism: 2/2 poorly healing wounds b/l  BPH (benign prostatic hypertrophy)  Hyperlipemia  Coronary artery disease  Hypertension  Dyspepsia: On moderate exertion.  Sleep apnea, obstructive: Requires home 02 therapy, and treatment with BIPAP  Atelectasis  Pleural effusion, bilateral  Respiratory failure  Peripheral edema  CRI (chronic renal insufficiency)  Gout  CRF (chronic renal failure)  Benign prostatic hypertrophy  Spinal stenosis  Hypercholesterolemia  GERD (gastroesophageal reflux disease)  CAD (coronary artery disease)  Hypertension  S/P angioplasty with stent  Cataract of left eye  Prostate: Surgery green light procedure.  S/P rotator cuff surgery: Right  S/P angioplasty  Rotator cuff tear, right: repair      PREVIOUS DIAGNOSTIC TESTING:      MEDICATIONS  (STANDING):  buDESOnide   0.5 milliGRAM(s) Respule 0.5milliGRAM(s) Inhalation two times a day  doxazosin 8milliGRAM(s) Oral at bedtime  isosorbide   mononitrate ER Tablet (IMDUR) 120milliGRAM(s) Oral daily  diltiazem   CD 120milliGRAM(s) Oral daily  gabapentin 300milliGRAM(s) Oral daily  allopurinol 100milliGRAM(s) Oral daily  fenofibrate Tablet 145milliGRAM(s) Oral daily  simvastatin 10milliGRAM(s) Oral at bedtime  pramipexole 0.125milliGRAM(s) Oral three times a day  ALBUTerol    90 MICROgram(s) HFA Inhaler 2Puff(s) Inhalation every 6 hours  ammonium lactate 12% Lotion 1Application(s) Topical two times a day  fluticasone propionate 50 MICROgram(s)/spray Nasal Spray 1Spray(s) Both Nostrils two times a day  metoprolol 12.5milliGRAM(s) Oral two times a day  hydrALAZINE 150milliGRAM(s) Oral two times a day  lactobacillus acidophilus 1Tablet(s) Oral daily  gabapentin 600milliGRAM(s) Oral at bedtime  pantoprazole  Injectable 40milliGRAM(s) IV Push every 12 hours  furosemide    Tablet 40milliGRAM(s) Oral daily  docusate sodium 100milliGRAM(s) Oral two times a day  senna 2Tablet(s) Oral at bedtime    MEDICATIONS  (PRN):  aluminum hydroxide/magnesium hydroxide/simethicone Suspension 30milliLiter(s) Oral every 6 hours PRN Dyspepsia  HYDROmorphone  Injectable 0.5milliGRAM(s) IV Push every 4 hours PRN Severe Pain (7 - 10)  phenol 1.4% (CHLORASEPTIC) Oral Spray 2Spray(s) Topical three times a day PRN mouth  care  oxyCODONE  5 mG/acetaminophen 325 mG 1Tablet(s) Oral every 4 hours PRN Moderate Pain (4 - 6)          FAMILY HISTORY:  No pertinent family history in first degree relatives      REVIEW OF SYSTEM:  Pertinent items are noted in HPI.  Constitutional negative for chills, fevers, sweats and weight loss  throat, and face:  negative for epistaxis, nasal congestion, sore throat and   tinnitus  Respiratory:pos dyspnea on exertion, pleuritic chest pain  and wheezing  Cardiovascular:  negative for chest pain, dyspnea and palpitations  Gastrointestinal: negative for abdominal pain, diarrhea, nausea and vomiting  Genitourinary: negative for dysuria, frequency and urinary incontinence  Skin:  negative for redness, rash, pruritus, swelling, dryness and   fissures  Hematologic/lymphatic: negative for bleeding and easy bruising  Musculoskeletal: posfor arthralgias, back pain and muscle weakness  Neurological: negative for dizziness, headaches, seizures and tremors  Behavioral/Psych:  negative for mood change, depression, suicidal attempts    Vital Signs Last 24 Hrs  T(C): 37.1, Max: 37.6 (05-21 @ 20:42)  T(F): 98.7, Max: 99.6 (05-21 @ 20:42)  HR: 88 (62 - 88)  BP: 166/50 (126/68 - 166/50)  BP(mean): --  RR: 18 (18 - 20)  SpO2: 93% (93% - 100%)    PHYSICAL EXAM  General Appearance: cooperative, no acute distress,   HEENT: PERRL, conjunctiva clear, EOM's intact,   Neck: Supple, , no adenopathy, thyroid: not enlarged, no carotid bruit or JVD  Back: Symmetric, no  tenderness,no soft tissue tenderness  Lungs: Clear to auscultation bilateral,no adventitious breath sounds, normal   expiratory phase, few bibasilar rales  Heart: Regular rate and rhythm, S1, S2 normal, no murmur, rub or gallop  Abdomen: Soft, non-tender, bowel sounds active , no hepatosplenomegaly  Extremities:cyanotic right toes, chrnic peripheral/ distal ulcerations/ peripheral edema  Skin: Skin color, texture normal, no rashes   Neurologic: Alert and oriented X3 , cranial nerves intact, sensory and motor normal,    ECG:                            10.8   4.9   )-----------( 300      ( 17 May 2017 07:47 )             35.6       05-17    150<H>  |  119<H>  |  77<H>  ----------------------------<  106<H>  4.7   |  23  |  1.39<H>    Ca    7.9<L>      17 May 2017 07:47      LABS:                        10.2   6.4   )-----------( 302      ( 15 May 2017 05:19 )             33.5   05-15    148<H>  |  120<H>  |  80<H>  ----------------------------<  97  4.7   |  19<L>  |  1.49<H>    Ca    7.9<L>      15 May 2017 05:19                            10.7   8.3   )-----------( 244      ( 12 May 2017 06:42 )             34.0     05-12    145  |  118<H>  |  95<H>  ----------------------------<  124<H>  5.4<H>   |  18<L>  |  2.09<H>    Ca    7.8<L>      12 May 2017 05:33      CARDIAC MARKERS ( 12 May 2017 05:33 )  0.026 ng/mL / x     / x     / x     / x      CARDIAC MARKERS ( 11 May 2017 12:55 )  0.033 ng/mL / x     / x     / x     / x              PROCEDURE DATE:  05/04/2017        INTERPRETATION:  History: GI bleed admission    Chest:  one view.      Comparison: 3/31/2017    AP radiograph of the chest demonstrates no evidence of infiltrate,   pleural effusion or vascular congestion. No atelectasis is seen. The   cardiac silhouette is normal in size. Osseous structures are intact.    Impression: No active pulmonary disease.    Contrast:     Oral contrast only    Comparison: CT abdomen and pelvis 6/13/2016    Findings:  LIVER: Normal.  SPLEEN: Normal.  PANCREAS: Normal.  GALLBLADDER/BILIARY TREE: Nondilated. Gallstone is present.  ADRENALS: Normal.  KIDNEYS: No calcification, hydronephrosis, or soft tissue attenuating   renal mass.  LYMPHADENOPATHY/RETROPERITONEUM: No adenopathy.  VASCULATURE: Extensive aortoiliac vascular calcification without   aneurysm. Infrarenal vena cava filter in place.  BOWEL: No bowel related abnormality. Specifically, no bowel obstruction.  PELVIC VISCERA: Calcified BPH in the prostate is noted.  PELVIC LYMPH NODES: No pelvic adenopathy.  PERITONEUM/ABDOMINAL WALL: No free air orascites.  SKELETAL: No acute bony abnormality.  LUNG BASES: Clear.    IMPRESSION:     Preponderance of abdominal visceral adipose tissue without evidence for   obstruction, mass, or ascites.          RADIOLOGY & ADDITIONAL STUDIES:  cxr noted and results discussed with pt and dtr today  5/12  mild PVM and small left sided effusion    PROCEDURE DATE:  05/12/2017        INTERPRETATION:  CHEST SINGLE VIEW FRONTAL    Single AP view    HISTORY:  preop    Comparison:  Chest x-ray 5/4/2017    The cardiac silhouette is within normal limits. Mild pulmonary vascular   congestion and small left effusion.    IMPRESSION: Mild pulmonary vascular congestion and small left pleural   effusion has developed.      PROCEDURE DATE:  05/14/2017        INTERPRETATION:  EXAMINATION DATE: May 14, 2017 at 0902 hours.  CLINICAL INFORMATION: CHF.    TECHNIQUE: Frontal view of the chest   COMPARISON: May 12, 2017.    FINDINGS:    LUNGS/PLEURA: Small left pleural effusion with basilar atelectasis,   unchanged. No pneumothorax.  MEDIASTINUM: Cardiac silhouette is enlarged.  OTHER: None.    IMPRESSION:     Since May 12, 2017, unchanged small left pleural effusion with basilar   atelectasis.    PROCEDURE DATE:  05/14/2017        INTERPRETATION:  EXAMINATION DATE: May 14, 2017 at 0902 hours.  CLINICAL INFORMATION: CHF.    TECHNIQUE: Frontal view of the chest   COMPARISON: May 12, 2017.    FINDINGS:    LUNGS/PLEURA: Small left pleural effusion with basilar atelectasis,   unchanged. No pneumothorax.  MEDIASTINUM: Cardiac silhouette is enlarged.  OTHER: None.    IMPRESSION:     Since May 12, 2017, unchanged small left pleural effusion with basilar   atelectasis.  cxr 5/18  PROCEDURE DATE:  05/18/2017        INTERPRETATION:  Clinical information: Congestive heart failure    Frontal view of the chest from 0905 hours:     COMPARISON:  May 14, 2017    The cardiac, hilar and mediastinal contours are unremarkable. The lungs   are clear. There has been clearing of the previously described left   basilar opacity. The osseous structures demonstrate no acute abnormality.    IMPRESSION:    Clear lungs

## 2017-05-22 NOTE — PROGRESS NOTE ADULT - ASSESSMENT
anemia, likely multifactorial  I suspect that there is a component of GI bleeding, appears stabilized on PPI  and hct stable    will check HCT and serial H H   mild dec likely with debridment, stable    He has received multiple units of packed red blood cells     protonix po bid   Serial H&H'    lower extremity DVT, status post IVC filter    Sepsis likely secondary to lower extremity cellulitis, resolved    Advanced COPD and obstructive sleep apnea reviewed.    DW  Dr Henry, [facility] risk bleed with anti plt and S/P debridement of foot

## 2017-05-22 NOTE — PROGRESS NOTE ADULT - SUBJECTIVE AND OBJECTIVE BOX
Patient is a 82y old  Male who presents with a chief complaint of Worsening leg pain, anemia, ARYA sent from Rehab (04 May 2017 15:56)      HPI:  82 year old male with history of CAD s/p stents (LAD, RCA bare metal around 9/16), A.Fib not on anticoagulation due to GI bleeding, Hypertension, COPD on home O2 2L, SHAYY on nocturnal BIPAP, ex-smoker (smoked 1ppd X 50 years, quit 22 years ago),  Chronic diastolic CHF, Hyperlipidemia, PVD, Iron deficiency anemia, Chronic back pain, Gout, BPH, CKD III with baseline Cr 2, recently admitted to  in 4/17 with anemia of GI bleeding, RLE and RUE DVTs, received pRBCs and IVC filter discharged to rehab with aspirin was sent to  ER on 5/4/17 for worsening anemia with Hb 6, worsening leg pain from ulcers and worsening renal function Cr 3.99.    Pt seen bed side.  Not well oriented currently after receiving Dilaudid for pain in ER.  As per daughters, pt was walking with a walker at rehab and was doing better.  Pt was found to have tarry black stools in ER, guaic positive. (04 May 2017 15:56)    vida dioet  neg abd pain x mild reflux with laying flat and then remembers to raise HOB  neg dysphagi  neg blood in BM      PAST MEDICAL & SURGICAL HISTORY:  Sepsis, due to unspecified organism: 2/2 poorly healing wounds b/l  BPH (benign prostatic hypertrophy)  Hyperlipemia  Coronary artery disease  Hypertension  Dyspepsia: On moderate exertion.  Sleep apnea, obstructive: Requires home 02 therapy, and treatment with BIPAP  Atelectasis  Pleural effusion, bilateral  Respiratory failure  Peripheral edema  CRI (chronic renal insufficiency)  Gout  CRF (chronic renal failure)  Benign prostatic hypertrophy  Spinal stenosis  Hypercholesterolemia  GERD (gastroesophageal reflux disease)  CAD (coronary artery disease)  Hypertension  S/P angioplasty with stent  Cataract of left eye  Prostate: Surgery green light procedure.  S/P rotator cuff surgery: Right  S/P angioplasty  Rotator cuff tear, right: repair      MEDICATIONS  (STANDING):  buDESOnide   0.5 milliGRAM(s) Respule 0.5milliGRAM(s) Inhalation two times a day  doxazosin 8milliGRAM(s) Oral at bedtime  isosorbide   mononitrate ER Tablet (IMDUR) 120milliGRAM(s) Oral daily  diltiazem   CD 120milliGRAM(s) Oral daily  gabapentin 300milliGRAM(s) Oral daily  allopurinol 100milliGRAM(s) Oral daily  fenofibrate Tablet 145milliGRAM(s) Oral daily  simvastatin 10milliGRAM(s) Oral at bedtime  pramipexole 0.125milliGRAM(s) Oral three times a day  ALBUTerol    90 MICROgram(s) HFA Inhaler 2Puff(s) Inhalation every 6 hours  fluticasone propionate 50 MICROgram(s)/spray Nasal Spray 1Spray(s) Both Nostrils two times a day  metoprolol 12.5milliGRAM(s) Oral two times a day  hydrALAZINE 150milliGRAM(s) Oral two times a day  lactobacillus acidophilus 1Tablet(s) Oral daily  gabapentin 600milliGRAM(s) Oral at bedtime  pantoprazole  Injectable 40milliGRAM(s) IV Push every 12 hours  docusate sodium 100milliGRAM(s) Oral two times a day  senna 2Tablet(s) Oral at bedtime  furosemide    Tablet 80milliGRAM(s) Oral daily    MEDICATIONS  (PRN):  aluminum hydroxide/magnesium hydroxide/simethicone Suspension 30milliLiter(s) Oral every 6 hours PRN Dyspepsia  HYDROmorphone  Injectable 0.5milliGRAM(s) IV Push every 4 hours PRN Severe Pain (7 - 10)  phenol 1.4% (CHLORASEPTIC) Oral Spray 2Spray(s) Topical three times a day PRN mouth  care  oxyCODONE  5 mG/acetaminophen 325 mG 1Tablet(s) Oral every 4 hours PRN Moderate Pain (4 - 6)      Allergies    No Known Allergies    Intolerances        SOCIAL HISTORY:uncahgned    FAMILY HISTORY:  No pertinent family history in first degree relatives      REVIEW OF SYSTEMS:    CONSTITUTIONAL: No weakness, fevers or chills  EYES/ENT: No visual changes;  No vertigo or throat pain   NECK: No pain or stiffness  RESPIRATORY: No cough, wheezing, hemoptysis; No shortness of breath  CARDIOVASCULAR: No chest pain or palpitations  GENITOURINARY: No dysuria, frequency or hematuria  NEUROLOGICAL: No numbness or weakness  SKIN: No itching, burning, rashes, or lesions   All other review of systems is negative unless indicated above.    Vital Signs Last 24 Hrs  T(C): 37.1, Max: 37.6 (05-21 @ 20:42)  T(F): 98.7, Max: 99.6 (05-21 @ 20:42)  HR: 87 (62 - 87)  BP: 166/50 (126/68 - 166/50)  BP(mean): --  RR: 18 (18 - 20)  SpO2: 93% (93% - 100%)    PHYSICAL EXAM:    Constitutional: NAD, well-developed  HEENT: EOMI, throat clear  Neck: No LAD, supple  Respiratory: CTA and P  Cardiovascular: S1 and S2, RRR, no M  Gastrointestinal: BS+, soft, NT/ND, neg HSM,  Extremities: No peripheral edema, neg clubing, cyanosis  dressing on R leg and toes look perfused  Neurological: A/O x 3, no focal deficits  Psychiatric: Normal mood, normal affect  Skin: No rashes    LABS:  CBC Full  -  ( 21 May 2017 08:06 )  WBC Count : 6.9 K/uL  Hemoglobin : 9.9 g/dL  Hematocrit : 32.7 %  Platelet Count - Automated : 286 K/uL  Mean Cell Volume : 93.0 fl  Mean Cell Hemoglobin : 28.3 pg  Mean Cell Hemoglobin Concentration : 30.5 gm/dL  Auto Neutrophil # : x  Auto Lymphocyte # : x  Auto Monocyte # : x  Auto Eosinophil # : x  Auto Basophil # : x  Auto Neutrophil % : x  Auto Lymphocyte % : x  Auto Monocyte % : x  Auto Eosinophil % : x  Auto Basophil % : x    05-21    150<H>  |  116<H>  |  61<H>  ----------------------------<  99  3.9   |  23  |  1.03    Ca    7.9<L>      21 May 2017 08:06              RADIOLOGY & ADDITIONAL STUDIES:

## 2017-05-22 NOTE — PROGRESS NOTE ADULT - ASSESSMENT
82 year old male with history of CAD s/p stents (LAD, RCA bare metal around 9/16), A.Fib not on anticoagulation due to GI bleeding, Hypertension, COPD on home O2 2L, SHAYY on nocturnal BIPAP, ex-smoker (smoked 1ppd X 50 years, quit 22 years ago),  Chronic diastolic CHF, Hyperlipidemia, PVD, Iron deficiency anemia, Chronic back pain, Gout, BPH, CKD III with baseline Cr 2, recently admitted to  in 4/17 with anemia of GI bleeding, RLE and RUE DVTs, received pRBCs and IVC filter discharged to rehab with aspirin was sent to  ER on 5/4/17 for worsening anemia with Hb 6, worsening leg pain from ulcers and worsening renal function Cr 3.99.  s/p vascular surgery, extubated and tolerated well  Advanced COPD on home o2  no active bronchospasm  CHF , appears clinically stable/ wt reported 215 lbs  SHAYY on Bipap  rest pulm dysfunction due to obesity  pulm status appears optimized if needs additional surgical interventions  data rev with Dr Clement today and Dtr at [facility]  suggest  repeat cxr today  incentive spirometry  bronchodilator rx  nocturnal and post op BIPAP useful  if lethargic or any new resp sxs will need ABG  good response to diuresis  cxr showed mild CHF and new small left sided effusion  diuresis as per renal  fu cxr in am  Pulm status is adeq optimized if repeat surgery needed for debridement  get repeat cxr today  cont nebulizer rx  post op BIPAP required  encourage incentive spirometry use  cont with Duoneb in nebulizer  cxr is clear  s/p debridement  tolerated well  I shall fu prn / outpt.  pls reconsult if needed

## 2017-05-22 NOTE — PROGRESS NOTE ADULT - ASSESSMENT
# ARYA due to pre-renal azothemia with CKD stage 4 at baseline ( 2.0-2.5)  # Anemia due to acute blood loss anemia  # Sepsis? due to LE ulcers  # Multiple DVT on RUE/RLE with s/p IVC fileter  # HTN  5/15 MK  - ARYA/CKD , near his baseline when he is on no diuretics.  Will restart lasix at 40 mg iv daily and moniter response  - PVD with cellulitis with stasis: now improving in appearance, no angio for now as per dr dugan.  abx as per dr rashid  - Anemia with acute blood loss, h/h stable  - HYpernatremia: increase his intake of free water, chronically this range  5/16 SY  --ARYA/CKD   improved and stable.  Tolerated angiogram with stent without acute event.  No further studies planned at this point.  Lasix resumed.  Follow fluid status closely  --Hypernatremia : expect improvement with diuretics.  --PVD /LE edema and cellulitis.  Post Angiogram and stent placement,  Abtx completed.  Continue wound care.  5/17 MK  - ARYA/CKD now with stable paramenters.  change to po lasix   - Hypernatremia: moniter now on po lasix  - PVD with stasis ulcers with celllutits: possible debridement  5/18  Dr Pritchard recommendation of eschar debridement noted, R foot  pt doing well otherwise  creat down to 1.2 range still hypernatremic, chronic  on po  lasix  5/19 mk  - Stable CKD: will increse his lasix to 80 mg  - Chronic hypernatremia: moniter on increased po intake  - for debridement in am will await decision regarding timing of next angiogram  5/20 SY  --ARYA improved.  Positive CKD stable  --Chronic LE edema  Continue po lasix  --Post wound debridement.  Continue to follow  --Hypernatremia : chronic  encourage po fluid.  5/21 MK  - resolved ARYA with CKD at baseline  - LE edema; will increase lasix  - s/p debridement  - chronic hypernatremia: encourage po fluid    5/22 MK  - ARYA/CKd with optimal numbers.  continue with lasix 80 mg po  - chronic hypernatremia : 40 oz fluid intake advised to patient

## 2017-05-22 NOTE — PROGRESS NOTE ADULT - ASSESSMENT
adequately perfused appearing R foot; wound care written    R groin with supf appearing maceration: wound care ordered

## 2017-05-22 NOTE — PROGRESS NOTE ADULT - SUBJECTIVE AND OBJECTIVE BOX
CC: Anemia, LE wounds.     HPI: 82 year old male with history of CAD s/p stents (LAD, RCA bare metal around 9/16), A.Fib not on anticoagulation due to GI bleeding, Hypertension, COPD on home O2 2L, SHAYY on nocturnal BIPAP, ex-smoker (smoked 1ppd X 50 years, quit 22 years ago),  Chronic diastolic CHF, Hyperlipidemia, PVD, Iron deficiency anemia, Chronic back pain, Gout, BPH, CKD III with baseline Cr 2, recently admitted to  in 4/17 with anemia of GI bleeding, RLE and RUE DVTs, received pRBCs and IVC filter discharged to rehab with aspirin was sent to  ER on 5/4/17 for worsening anemia with Hb 6, worsening leg pain from ulcers and worsening renal function Cr 3.99. In ED, + guaiac.     Hospital course: prolonged including stabilization of suspected GIB with RBC transfusion and PPI BID. EGD on hold given ongoing LE wounds and angioplasty this admission. Patient underwent RLE stent and angioplasty with Vascular and prolonged IV Cefepime for wounds. HD stable.     5/20/17: Seen post op from RLE surgical debridement. No CP or SOB. Pain controlled currently.     5/21/17: Feels ok, no lower extremity pain. Was sitting in chair earlier. Need to clarify weight bearing status with Vascular.     5/22/17: Seen w/ daughter in room. Worked a little with PT but c/o of severe RLE pain. No CP.     ROS: stated above.     Vital Signs Last 24 Hrs  T(C): 37.1, Max: 37.6 (05-21 @ 20:42)  T(F): 98.7, Max: 99.6 (05-21 @ 20:42)  HR: 88 (77 - 88)  BP: 166/50 (154/47 - 166/50)  BP(mean): --  RR: 18 (18 - 18)  SpO2: 93% (93% - 99%)    Physical exam:  Appears chronically ill and obese male in NAD, awake and alert in moderate pain.   HEENT: PERRLA  Lungs- decrease air entry at bases, no active wheezing.   S1S2, normal rhthym  Abd- soft, NT, BS+, obese and obese.   Ext- dressing both legs,. + 2 LE edema. RLE in ace-bandage, notable swelling.    Neuro- AAOx3    LABS:                        9.9    6.9   )-----------( 286      ( 21 May 2017 08:06 )             32.7               05-21    150<H>  |  116<H>  |  61<H>  ----------------------------<  99  3.9   |  23  |  1.03    Ca    7.9<L>      21 May 2017 08:01        5/14 CXR: Since May 12, 2017, unchanged small left pleural effusion with basilar   atelectasis.    MEDICATIONS  (STANDING):  buDESOnide   0.5 milliGRAM(s) Respule 0.5milliGRAM(s) Inhalation two times a day  doxazosin 8milliGRAM(s) Oral at bedtime  isosorbide   mononitrate ER Tablet (IMDUR) 120milliGRAM(s) Oral daily  diltiazem   CD 120milliGRAM(s) Oral daily  gabapentin 300milliGRAM(s) Oral daily  allopurinol 100milliGRAM(s) Oral daily  fenofibrate Tablet 145milliGRAM(s) Oral daily  simvastatin 10milliGRAM(s) Oral at bedtime  pramipexole 0.125milliGRAM(s) Oral three times a day  ALBUTerol    90 MICROgram(s) HFA Inhaler 2Puff(s) Inhalation every 6 hours  fluticasone propionate 50 MICROgram(s)/spray Nasal Spray 1Spray(s) Both Nostrils two times a day  metoprolol 12.5milliGRAM(s) Oral two times a day  hydrALAZINE 150milliGRAM(s) Oral two times a day  lactobacillus acidophilus 1Tablet(s) Oral daily  gabapentin 600milliGRAM(s) Oral at bedtime  pantoprazole  Injectable 40milliGRAM(s) IV Push every 12 hours  docusate sodium 100milliGRAM(s) Oral two times a day  senna 2Tablet(s) Oral at bedtime  furosemide    Tablet 80milliGRAM(s) Oral daily    Assessment/Plan:   This is an 82 year old male with history of CAD s/p stents (LAD, RCA bare metal around 9/16), A.Fib not on anticoagulation due to GI bleeding, Hypertension, COPD on home O2 2L,   Chronic diastolic CHF, Hyperlipidemia, PVD, CKD III with baseline Cr 2 admitted for anemia and GIB now stable with ongoing LE wounds s/p angioplasty with vascular surgery completed abx:     #Severe PAD.   - 05/11 endarterectomy of CFA plaque in an attempt of limb preservation.  05/08 angiogram + angioplasty.  stenting of right common iliac + external iliac arteries.  statin.   - 5/20: repeat RLE debridement of black ischar.   - on statin. No Asp/ plavix as yet given concern for GI bleed (no H&H stable for days) --> WILL DEFER to GI and Vascular regarding safety of starting anti-platelet therapy.  - Educated on incentive spirometry use post op.   - pain control, IV pain meds --> needs to be controlled on Oral prior to dc. On Percocet q4hr prn.   - awaiting clarification of weight bearing status. PT. Will need rehab.     #chronic right LE stasis ulcers, dermatitis w/ cellulitis.    - S/p 2 weeks of IV Cefepime, completed on 5/16.     #GIB/acute blood loss anemia.  s/p 5 units PRBCs. No plans for EGD given ongoing resp issues on chronic O2.   Cont PPI BID per GI. Hb ~10. STABLE.   GI f/u appreciated  - daily CBC. To decide on starting antiplatelet therapy in the setting of LE stent if placed later this week.       #ARYA upon CKD.  Cr improved, back on Lasix --> switched to PO on 5/17.   Nephrology following.    # Chronic Hypernatremia - oral intake encouraged w/ free water.     #multiple DVT RUE/RLL.  s/p IVC Filter.    DVT ppx: no pharmacological ppx 2/2 GIB, no venodynes 2/2 LLE wounds. Will start mobilization as tolerated.     Dispo: remain inpatient on Med-surg, pain control. Bowel regimen.     Total time 35 mins. Discussed with patient, staff and daughter. Possible further debridement + stent this week?

## 2017-05-23 ENCOUNTER — TRANSCRIPTION ENCOUNTER (OUTPATIENT)
Age: 82
End: 2017-05-23

## 2017-05-23 LAB
ANION GAP SERPL CALC-SCNC: 9 MMOL/L — SIGNIFICANT CHANGE UP (ref 5–17)
BUN SERPL-MCNC: 55 MG/DL — HIGH (ref 7–23)
CALCIUM SERPL-MCNC: 8.2 MG/DL — LOW (ref 8.5–10.1)
CHLORIDE SERPL-SCNC: 113 MMOL/L — HIGH (ref 96–108)
CO2 SERPL-SCNC: 26 MMOL/L — SIGNIFICANT CHANGE UP (ref 22–31)
CREAT SERPL-MCNC: 1.15 MG/DL — SIGNIFICANT CHANGE UP (ref 0.5–1.3)
GLUCOSE SERPL-MCNC: 87 MG/DL — SIGNIFICANT CHANGE UP (ref 70–99)
HCT VFR BLD CALC: 33.3 % — LOW (ref 39–50)
HGB BLD-MCNC: 10.2 G/DL — LOW (ref 13–17)
MCHC RBC-ENTMCNC: 28.3 PG — SIGNIFICANT CHANGE UP (ref 27–34)
MCHC RBC-ENTMCNC: 30.5 GM/DL — LOW (ref 32–36)
MCV RBC AUTO: 92.7 FL — SIGNIFICANT CHANGE UP (ref 80–100)
PLATELET # BLD AUTO: 282 K/UL — SIGNIFICANT CHANGE UP (ref 150–400)
POTASSIUM SERPL-MCNC: 3.6 MMOL/L — SIGNIFICANT CHANGE UP (ref 3.5–5.3)
POTASSIUM SERPL-SCNC: 3.6 MMOL/L — SIGNIFICANT CHANGE UP (ref 3.5–5.3)
RBC # BLD: 3.59 M/UL — LOW (ref 4.2–5.8)
RBC # FLD: 15.1 % — HIGH (ref 10.3–14.5)
SODIUM SERPL-SCNC: 148 MMOL/L — HIGH (ref 135–145)
WBC # BLD: 6.1 K/UL — SIGNIFICANT CHANGE UP (ref 3.8–10.5)
WBC # FLD AUTO: 6.1 K/UL — SIGNIFICANT CHANGE UP (ref 3.8–10.5)

## 2017-05-23 RX ORDER — ALBUTEROL 90 UG/1
2 AEROSOL, METERED ORAL
Qty: 0 | Refills: 0 | COMMUNITY
Start: 2017-05-23

## 2017-05-23 RX ORDER — OXYCODONE HYDROCHLORIDE 5 MG/1
1 TABLET ORAL
Qty: 0 | Refills: 0 | COMMUNITY
Start: 2017-05-23

## 2017-05-23 RX ORDER — FUROSEMIDE 40 MG
1 TABLET ORAL
Qty: 0 | Refills: 0 | COMMUNITY
Start: 2017-05-23

## 2017-05-23 RX ORDER — ASPIRIN/CALCIUM CARB/MAGNESIUM 324 MG
81 TABLET ORAL DAILY
Qty: 0 | Refills: 0 | Status: DISCONTINUED | OUTPATIENT
Start: 2017-05-24 | End: 2017-05-24

## 2017-05-23 RX ADMIN — Medication 150 MILLIGRAM(S): at 05:54

## 2017-05-23 RX ADMIN — SIMVASTATIN 10 MILLIGRAM(S): 20 TABLET, FILM COATED ORAL at 21:54

## 2017-05-23 RX ADMIN — Medication 100 MILLIGRAM(S): at 05:53

## 2017-05-23 RX ADMIN — Medication 120 MILLIGRAM(S): at 05:53

## 2017-05-23 RX ADMIN — Medication 12.5 MILLIGRAM(S): at 05:54

## 2017-05-23 RX ADMIN — PANTOPRAZOLE SODIUM 40 MILLIGRAM(S): 20 TABLET, DELAYED RELEASE ORAL at 18:36

## 2017-05-23 RX ADMIN — Medication 100 MILLIGRAM(S): at 13:04

## 2017-05-23 RX ADMIN — GABAPENTIN 600 MILLIGRAM(S): 400 CAPSULE ORAL at 21:53

## 2017-05-23 RX ADMIN — Medication 100 MILLIGRAM(S): at 18:30

## 2017-05-23 RX ADMIN — Medication 145 MILLIGRAM(S): at 13:04

## 2017-05-23 RX ADMIN — OXYCODONE HYDROCHLORIDE 10 MILLIGRAM(S): 5 TABLET ORAL at 02:02

## 2017-05-23 RX ADMIN — ALBUTEROL 2 PUFF(S): 90 AEROSOL, METERED ORAL at 02:24

## 2017-05-23 RX ADMIN — ALBUTEROL 2 PUFF(S): 90 AEROSOL, METERED ORAL at 14:09

## 2017-05-23 RX ADMIN — PRAMIPEXOLE DIHYDROCHLORIDE 0.12 MILLIGRAM(S): 0.12 TABLET ORAL at 05:54

## 2017-05-23 RX ADMIN — Medication 1 TABLET(S): at 13:09

## 2017-05-23 RX ADMIN — SENNA PLUS 2 TABLET(S): 8.6 TABLET ORAL at 21:53

## 2017-05-23 RX ADMIN — GABAPENTIN 300 MILLIGRAM(S): 400 CAPSULE ORAL at 13:04

## 2017-05-23 RX ADMIN — ALBUTEROL 2 PUFF(S): 90 AEROSOL, METERED ORAL at 09:20

## 2017-05-23 RX ADMIN — Medication 150 MILLIGRAM(S): at 18:29

## 2017-05-23 RX ADMIN — ISOSORBIDE MONONITRATE 120 MILLIGRAM(S): 60 TABLET, EXTENDED RELEASE ORAL at 13:11

## 2017-05-23 RX ADMIN — Medication 0.5 MILLIGRAM(S): at 09:20

## 2017-05-23 RX ADMIN — PRAMIPEXOLE DIHYDROCHLORIDE 0.12 MILLIGRAM(S): 0.12 TABLET ORAL at 13:05

## 2017-05-23 RX ADMIN — PANTOPRAZOLE SODIUM 40 MILLIGRAM(S): 20 TABLET, DELAYED RELEASE ORAL at 05:53

## 2017-05-23 RX ADMIN — Medication 12.5 MILLIGRAM(S): at 18:29

## 2017-05-23 RX ADMIN — Medication 8 MILLIGRAM(S): at 21:55

## 2017-05-23 RX ADMIN — Medication 80 MILLIGRAM(S): at 05:53

## 2017-05-23 RX ADMIN — Medication 1 SPRAY(S): at 05:55

## 2017-05-23 RX ADMIN — OXYCODONE HYDROCHLORIDE 10 MILLIGRAM(S): 5 TABLET ORAL at 09:50

## 2017-05-23 RX ADMIN — PRAMIPEXOLE DIHYDROCHLORIDE 0.12 MILLIGRAM(S): 0.12 TABLET ORAL at 21:53

## 2017-05-23 NOTE — DISCHARGE NOTE ADULT - HOSPITAL COURSE
CC: Anemia, LE wounds.     HPI: 82 year old male with history of CAD s/p stents (LAD, RCA bare metal around 9/16), A.Fib not on anticoagulation due to GI bleeding, Hypertension, COPD on home O2 2L, SHAYY on nocturnal BIPAP, ex-smoker (smoked 1ppd X 50 years, quit 22 years ago),  Chronic diastolic CHF, Hyperlipidemia, PVD, Iron deficiency anemia, Chronic back pain, Gout, BPH, CKD III with baseline Cr 2, recently admitted to  in 4/17 with anemia of GI bleeding, RLE and RUE DVTs, received pRBCs and IVC filter discharged to rehab with aspirin was sent to  ER on 5/4/17 for worsening anemia with Hb 6, worsening leg pain from ulcers and worsening renal function Cr 3.99. In ED, + guaiac.     Hospital course: prolonged stay including stabilization of suspected GIB with RBC transfusion and PPI BID. EGD on hold given ongoing LE wounds and angioplasty this admission. Patient underwent RLE stent and angioplasty with Vascular and prolonged IV Cefepime for wounds. HD stable.     5/23/17: No signs of bleeding. Leg pain controlled on oral pain meds. No CP. Ready for dc to rehab when bed available. Discussed with daughter Iivs on the phone.     ROS: stated above.     Vital Signs Last 24 Hrs  T(C): 36.8, Max: 37.6 (05-23 @ 05:30)  T(F): 98.3, Max: 99.6 (05-23 @ 05:30)  HR: 80 (71 - 88)  BP: 139/44 (139/44 - 156/64)  BP(mean): --  RR: 18 (18 - 18)  SpO2: 99% (95% - 99%)  Physical exam:  Appears chronically ill and obese male in NAD, awake and alert in moderate pain.   HEENT: PERRLA  Lungs- decrease air entry at bases, no active wheezing.   S1S2, normal rhthym  Abd- soft, NT, BS+, obese and moderately distended but soft.   Ext- dressing both legs,. + 2 LE edema. RLE in ace-bandage, notable swelling.  Both legs in special booths.   Neuro- AAOx3    All labs reviewed. Stable CBC.                         10.2   6.1   )-----------( 282      ( 23 May 2017 08:24 )             33.3     This is an 82 year old male with history of CAD s/p stents (LAD, RCA bare metal around 9/16), A.Fib not on anticoagulation due to GI bleeding, Hypertension, COPD on home O2 2L,   Chronic diastolic CHF, Hyperlipidemia, PVD, CKD III with baseline Cr 2 admitted for anemia and GIB now stable with ongoing LE wounds s/p angioplasty with vascular surgery completed abx:     #Severe PAD.   - 05/11 endarterectomy of CFA plaque in an attempt of limb preservation.  05/08 angiogram + angioplasty.  stenting of right common iliac + external iliac arteries.  statin.   - 5/20: repeat RLE debridement of black ischar.   - on statin. Discussed with GI  today and also Dr. Wolff - high risk of bleeding but also w/ risk of thrombosis given recent leg angioplasty X 2 . Discussed with daughter and patient --> will recommend restarting Aspirin low dose. Hold Palvix.   - H&H stable, no signs of ongoing blood loss.   - prn Opioids Oxy 10 / 5mg.   - full weight bearing.   - dressing changes and instructions noted on this dc form for rehab to follow.     #chronic right LE stasis ulcers, dermatitis w/ cellulitis.    - S/p 2 weeks of IV Cefepime, completed on 5/16.     #GIB/acute blood loss anemia.  s/p 5 units PRBCs. No plans for EGD given ongoing resp issues on chronic O2.   Cont PPI BID per GI. Hb ~10. STABLE.   - restarting baby Aspirin.       #ARYA upon CKD.  Cr improved, back on Lasix --> switched to PO on 5/17.   Nephrology following.    # Chronic Hypernatremia - oral intake encouraged w/ free water.     #multiple DVT RUE/RLL.  s/p IVC Filter.    DVT ppx: no pharmacological ppx 2/2 GIB, no venodynes 2/2 LLE wounds. Will start mobilization as tolerated.     DC to rehab when bed available.   Total time > 35 mins. Case discussed with Cards, GI, SW and family.

## 2017-05-23 NOTE — PROGRESS NOTE ADULT - SUBJECTIVE AND OBJECTIVE BOX
Patient is a 82y old  Male who presents with a chief complaint of Anemia, Leg swelling (23 May 2017 13:02)      HPI:  82 year old male with history of CAD s/p stents (LAD, RCA bare metal around 9/16), A.Fib not on anticoagulation due to GI bleeding, Hypertension, COPD on home O2 2L, SHAYY on nocturnal BIPAP, ex-smoker (smoked 1ppd X 50 years, quit 22 years ago),  Chronic diastolic CHF, Hyperlipidemia, PVD, Iron deficiency anemia, Chronic back pain, Gout, BPH, CKD III with baseline Cr 2, recently admitted to  in 4/17 with anemia of GI bleeding, RLE and RUE DVTs, received pRBCs and IVC filter discharged to rehab with aspirin was sent to  ER on 5/4/17 for worsening anemia with Hb 6, worsening leg pain from ulcers and worsening renal function Cr 3.99.    Pt seen bed side.  Not well oriented currently after receiving Dilaudid for pain in ER.  As per daughters, pt was walking with a walker at rehab and was doing better.  Pt was found to have tarry black stools in ER, guaic positive. (04 May 2017 15:56)    patient is comfortable. He is tolerating feedings. He denies any abdominal pain. He had some mild heartburn with laying flat. This is well controlled when he is not laying flat. No blood noted  in the stool      PAST MEDICAL & SURGICAL HISTORY:  Sepsis, due to unspecified organism: 2/2 poorly healing wounds b/l  BPH (benign prostatic hypertrophy)  Hyperlipemia  Coronary artery disease  Hypertension  Dyspepsia: On moderate exertion.  Sleep apnea, obstructive: Requires home 02 therapy, and treatment with BIPAP  Atelectasis  Pleural effusion, bilateral  Respiratory failure  Peripheral edema  CRI (chronic renal insufficiency)  Gout  CRF (chronic renal failure)  Benign prostatic hypertrophy  Spinal stenosis  Hypercholesterolemia  GERD (gastroesophageal reflux disease)  CAD (coronary artery disease)  Hypertension  S/P angioplasty with stent  Cataract of left eye  Prostate: Surgery green light procedure.  S/P rotator cuff surgery: Right  S/P angioplasty  Rotator cuff tear, right: repair      MEDICATIONS  (STANDING):  buDESOnide   0.5 milliGRAM(s) Respule 0.5milliGRAM(s) Inhalation two times a day  doxazosin 8milliGRAM(s) Oral at bedtime  isosorbide   mononitrate ER Tablet (IMDUR) 120milliGRAM(s) Oral daily  diltiazem   CD 120milliGRAM(s) Oral daily  gabapentin 300milliGRAM(s) Oral daily  allopurinol 100milliGRAM(s) Oral daily  fenofibrate Tablet 145milliGRAM(s) Oral daily  simvastatin 10milliGRAM(s) Oral at bedtime  pramipexole 0.125milliGRAM(s) Oral three times a day  ALBUTerol    90 MICROgram(s) HFA Inhaler 2Puff(s) Inhalation every 6 hours  fluticasone propionate 50 MICROgram(s)/spray Nasal Spray 1Spray(s) Both Nostrils two times a day  metoprolol 12.5milliGRAM(s) Oral two times a day  hydrALAZINE 150milliGRAM(s) Oral two times a day  lactobacillus acidophilus 1Tablet(s) Oral daily  gabapentin 600milliGRAM(s) Oral at bedtime  pantoprazole  Injectable 40milliGRAM(s) IV Push every 12 hours  docusate sodium 100milliGRAM(s) Oral two times a day  senna 2Tablet(s) Oral at bedtime  furosemide    Tablet 80milliGRAM(s) Oral daily    MEDICATIONS  (PRN):  aluminum hydroxide/magnesium hydroxide/simethicone Suspension 30milliLiter(s) Oral every 6 hours PRN Dyspepsia  phenol 1.4% (CHLORASEPTIC) Oral Spray 2Spray(s) Topical three times a day PRN mouth  care  oxyCODONE  5 mG/acetaminophen 325 mG 1Tablet(s) Oral every 4 hours PRN Moderate Pain (4 - 6)  oxyCODONE IR 10milliGRAM(s) Oral every 4 hours PRN Severe Pain (7 - 10)      Allergies    No Known Allergies    Intolerances        SOCIAL HISTORY:unchanged    FAMILY HISTORY:  No pertinent family history in first degree relatives      REVIEW OF SYSTEMS:    CONSTITUTIONAL: No weakness, fevers or chills  EYES/ENT: No visual changes;  No vertigo or throat pain   NECK: No pain or stiffness  RESPIRATORY: No cough, wheezing, hemoptysis; No shortness of breath  CARDIOVASCULAR: No chest pain or palpitations  GENITOURINARY: No dysuria, frequency or hematuria  NEUROLOGICAL: No numbness or weakness  SKIN: No itching, burning, rashes, or lesions   All other review of systems is negative unless indicated above.    Vital Signs Last 24 Hrs  T(C): 36.9, Max: 37.6 (05-23 @ 05:30)  T(F): 98.4, Max: 99.6 (05-23 @ 05:30)  HR: 83 (71 - 88)  BP: 139/42 (139/42 - 156/64)  BP(mean): --  RR: 18 (18 - 18)  SpO2: 98% (95% - 99%)    PHYSICAL EXAM:    Constitutional: NAD, well-developed  HEENT: EOMI, throat clear  Neck: No LAD, supple  Respiratory: CTA and P  Cardiovascular: S1 and S2, RRR, no M  Gastrointestinal: BS+, soft, NT/ND, neg HSM,  Extremities: pos peripheral edema, neg clubing, cyanosis    Neurological: A/O x 3, no focal deficits  Psychiatric: Normal mood, normal affect  Skin: No rashes    LABS:  CBC Full  -  ( 23 May 2017 08:24 )  WBC Count : 6.1 K/uL  Hemoglobin : 10.2 g/dL  Hematocrit : 33.3 %  Platelet Count - Automated : 282 K/uL  Mean Cell Volume : 92.7 fl  Mean Cell Hemoglobin : 28.3 pg  Mean Cell Hemoglobin Concentration : 30.5 gm/dL  Auto Neutrophil # : x  Auto Lymphocyte # : x  Auto Monocyte # : x  Auto Eosinophil # : x  Auto Basophil # : x  Auto Neutrophil % : x  Auto Lymphocyte % : x  Auto Monocyte % : x  Auto Eosinophil % : x  Auto Basophil % : x    05-23    148<H>  |  113<H>  |  55<H>  ----------------------------<  87  3.6   |  26  |  1.15    Ca    8.2<L>      23 May 2017 08:24              RADIOLOGY & ADDITIONAL STUDIES:

## 2017-05-23 NOTE — PROGRESS NOTE ADULT - ASSESSMENT
# ARYA due to pre-renal azothemia with CKD stage 4 at baseline ( 2.0-2.5)  # Anemia due to acute blood loss anemia  # Sepsis? due to LE ulcers  # Multiple DVT on RUE/RLE with s/p IVC fileter  # HTN  5/15 MK  - ARYA/CKD , near his baseline when he is on no diuretics.  Will restart lasix at 40 mg iv daily and moniter response  - PVD with cellulitis with stasis: now improving in appearance, no angio for now as per dr dugan.  abx as per dr rashid  - Anemia with acute blood loss, h/h stable  - HYpernatremia: increase his intake of free water, chronically this range  5/16 SY  --ARYA/CKD   improved and stable.  Tolerated angiogram with stent without acute event.  No further studies planned at this point.  Lasix resumed.  Follow fluid status closely  --Hypernatremia : expect improvement with diuretics.  --PVD /LE edema and cellulitis.  Post Angiogram and stent placement,  Abtx completed.  Continue wound care.  5/17 MK  - ARYA/CKD now with stable paramenters.  change to po lasix   - Hypernatremia: moniter now on po lasix  - PVD with stasis ulcers with celllutits: possible debridement  5/18  Dr Pritchard recommendation of eschar debridement noted, R foot  pt doing well otherwise  creat down to 1.2 range still hypernatremic, chronic  on po  lasix  5/19 mk  - Stable CKD: will increse his lasix to 80 mg  - Chronic hypernatremia: moniter on increased po intake  - for debridement in am will await decision regarding timing of next angiogram  5/20 SY  --ARYA improved.  Positive CKD stable  --Chronic LE edema  Continue po lasix  --Post wound debridement.  Continue to follow  --Hypernatremia : chronic  encourage po fluid.  5/21 MK  - resolved ARYA with CKD at baseline  - LE edema; will increase lasix  - s/p debridement  - chronic hypernatremia: encourage po fluid    5/22 MK  - ARYA/CKd with optimal numbers.  continue with lasix 80 mg po  - chronic hypernatremia : 40 oz fluid intake advised to patient    5/23  Patient doing well on current Lasix dose 80 mg qd  po fluids encouraged

## 2017-05-23 NOTE — DISCHARGE NOTE ADULT - PATIENT PORTAL LINK FT
“You can access the [facility] Patient Portal, offered by [facility], by registering with the following website: [email or web address]”

## 2017-05-23 NOTE — DISCHARGE NOTE ADULT - MEDICATION SUMMARY - MEDICATIONS TO TAKE
I will START or STAY ON the medications listed below when I get home from the hospital:    budesonide 0.5 mg/2 mL inhalation suspension  -- 2 milliliter(s) inhaled 2 times a day, As Needed  -- Indication: For Asthma / COPD    Ecotrin Adult Low Strength 81 mg oral delayed release tablet  -- 1 tab(s) by mouth once a day  -- Indication: For CAD    acetaminophen 325 mg oral tablet  -- 2 tab(s) by mouth every 6 hours, As needed, For Temp greater than 38 C (100.4 F)  -- Indication: For Pain    acetaminophen-oxycodone 325 mg-5 mg oral tablet  -- 1 tab(s) by mouth every 4 hours, As needed, Moderate Pain (4 - 6)  -- Indication: For Pain    oxyCODONE 10 mg oral tablet  -- 1 tab(s) by mouth every 4 hours, As needed, Severe Pain (7 - 10)  -- Indication: For Severe Pain    aluminum hydroxide-magnesium hydroxide 200 mg-200 mg/5 mL oral suspension  -- 30 milliliter(s) by mouth every 6 hours, As needed, Dyspepsia  -- Indication: For Antacid    doxazosin 8 mg oral tablet  -- 1 tab(s) by mouth once a day (at bedtime)  -- Indication: For BPH    nitroglycerin 0.4 mg sublingual tablet  -- 1 tab(s) under tongue every 5 minutes, As Needed for chest pain  -- Indication: For Chest Pain    isosorbide mononitrate 120 mg oral tablet, extended release  -- 1 tab(s) by mouth once a day (in the morning)  -- Indication: For HTN    diltiazem 120 mg/12 hours oral capsule, extended release  -- 1 cap(s) by mouth every 12 hours  -- Indication: For HTN    gabapentin 300 mg oral capsule  -- 2 cap(s) by mouth once a day (at bedtime)  -- Indication: For Radiculopathy pain    gabapentin 300 mg oral capsule  -- 1 tab(s) by mouth once a day (in the morning)  -- Indication: For Radiculopathy pain    allopurinol 100 mg oral tablet  -- 1 tab(s) by mouth once a day  -- Indication: For Gout    levocetirizine 5 mg oral tablet  -- 1 tab(s) by mouth once a day (in the evening)  -- Indication: For Allergy    simvastatin 10 mg oral tablet  -- 1 tab(s) by mouth once a day (at bedtime)  -- Indication: For Hyperlipidemia    fenofibrate 145 mg oral tablet  -- 1 tab(s) by mouth once a day  -- Indication: For Cholesterol    pramipexole 0.125 mg oral tablet  -- 1 tab(s) by mouth 3 times a day  -- Indication: For Anti-parkinson agent    metoprolol tartrate 25 mg oral tablet  -- 0.5 tab(s) by mouth 2 times a day  -- Indication: For HTN    DuoNeb 0.5 mg-2.5 mg/3 mL inhalation solution  -- 3 milliliter(s) inhaled 4 times a day, As Needed  -- Indication: For Shortness of breath    albuterol 90 mcg/inh inhalation aerosol  -- 2 puff(s) inhaled every 6 hours  -- Indication: For Shortness of breath    ammonium lactate topical cream  -- Apply on skin to affected area 2 times a day  -- to B/l upper xtremities  -- Indication: For Skin    furosemide 80 mg oral tablet  -- 1 tab(s) by mouth once a day  -- Indication: For Diuretic    ranitidine 150 mg oral capsule  -- 1 cap(s) by mouth once a day (at bedtime)  -- Indication: For REflux    ferrous sulfate 325 mg (65 mg elemental iron) oral delayed release tablet  -- 1 tab(s) by mouth 3 times a day  -- Indication: For Anemia    senna oral tablet  -- 2 tab(s) by mouth once a day (at bedtime)  -- Indication: For Constipation    simethicone 80 mg oral tablet, chewable  -- 1 tab(s) by mouth 2 times a day  -- Indication: For Gas pains    fluticasone 50 mcg/inh nasal spray  -- 1 spray(s) into nose 2 times a day  -- Indication: For Nasal congestion    Fish Oil 1000 mg oral capsule  -- 1 cap(s) by mouth 2 times a day  -- Indication: For Fish oil    Chondroitin-Glucosamine 200 mg-250 mg oral capsule  -- 1 cap(s) by mouth 2 times a day  -- Indication: For Supplement    pantoprazole 40 mg oral delayed release tablet  -- 1 tab(s) by mouth 2 times a day (before meals)  -- Indication: For Anti-acid    hydrALAZINE  -- 150 milligram(s) by mouth 2 times a day  -- Indication: For HTN    Vitamin B Compound Strong  -- 1 tab(s) by mouth once a day  -- Indication: For Vit B    Vitamin C 1000 mg oral tablet  -- 1 tab(s) by mouth once a day  -- Indication: For Vit C    Vitamin D3 2000 intl units oral capsule  -- 1 cap(s) by mouth once a day  -- Indication: For Vit D I will START or STAY ON the medications listed below when I get home from the hospital:    budesonide 0.5 mg/2 mL inhalation suspension  -- 2 milliliter(s) inhaled 2 times a day, As Needed  -- Indication: For Copd    Ecotrin Adult Low Strength 81 mg oral delayed release tablet  -- 1 tab(s) by mouth once a day  -- Indication: For prevention    acetaminophen 325 mg oral tablet  -- 2 tab(s) by mouth every 6 hours, As needed, For Temp greater than 38 C (100.4 F)  -- Indication: For pain    acetaminophen-oxycodone 325 mg-5 mg oral tablet  -- 1 tab(s) by mouth every 4 hours, As needed, Moderate Pain (4 - 6)  -- Indication: For pain    oxyCODONE 10 mg oral tablet  -- 1 tab(s) by mouth every 4 hours, As needed, Severe Pain (7 - 10)  -- Indication: For pain    aluminum hydroxide-magnesium hydroxide 200 mg-200 mg/5 mL oral suspension  -- 30 milliliter(s) by mouth every 6 hours, As needed, Dyspepsia  -- Indication: For Antacid    doxazosin 8 mg oral tablet  -- 1 tab(s) by mouth once a day (at bedtime)  -- Indication: For Hypertension    nitroglycerin 0.4 mg sublingual tablet  -- 1 tab(s) under tongue every 5 minutes, As Needed for chest pain  -- Indication: For Cad    isosorbide mononitrate 120 mg oral tablet, extended release  -- 1 tab(s) by mouth once a day (in the morning)  -- Indication: For CAD    diltiazem 120 mg/12 hours oral capsule, extended release  -- 1 cap(s) by mouth every 12 hours  -- Indication: For Hypertension    gabapentin 300 mg oral capsule  -- 2 cap(s) by mouth once a day (at bedtime)  -- Indication: For pain    gabapentin 300 mg oral capsule  -- 1 tab(s) by mouth once a day (in the morning)  -- Indication: For pain    allopurinol 100 mg oral tablet  -- 1 tab(s) by mouth once a day  -- Indication: For gout    levocetirizine 5 mg oral tablet  -- 1 tab(s) by mouth once a day (in the evening)  -- Indication: For Antihistamine    simvastatin 10 mg oral tablet  -- 1 tab(s) by mouth once a day (at bedtime)  -- Indication: For Hyperlipidemia    fenofibrate 145 mg oral tablet  -- 1 tab(s) by mouth once a day  -- Indication: For Hyplipidemeia    pramipexole 0.125 mg oral tablet  -- 1 tab(s) by mouth 3 times a day  -- Indication: For Anti parkinsons    metoprolol tartrate 25 mg oral tablet  -- 0.5 tab(s) by mouth 2 times a day  -- Indication: For Hypertension    DuoNeb 0.5 mg-2.5 mg/3 mL inhalation solution  -- 3 milliliter(s) inhaled 4 times a day, As Needed  -- Indication: For Copd    albuterol 90 mcg/inh inhalation aerosol  -- 2 puff(s) inhaled every 6 hours  -- Indication: For Copd    ammonium lactate topical cream  -- Apply on skin to affected area 2 times a day  -- to B/l upper xtremities  -- Indication: For Skin care    furosemide 80 mg oral tablet  -- 1 tab(s) by mouth once a day  -- Indication: For Chf    ranitidine 150 mg oral capsule  -- 1 cap(s) by mouth once a day (at bedtime)  -- Indication: For gerd    ferrous sulfate 325 mg (65 mg elemental iron) oral delayed release tablet  -- 1 tab(s) by mouth 3 times a day  -- Indication: For Anemia    senna oral tablet  -- 2 tab(s) by mouth once a day (at bedtime)  -- Indication: For Constipation    simethicone 80 mg oral tablet, chewable  -- 1 tab(s) by mouth 2 times a day  -- Indication: For Constipation    fluticasone 50 mcg/inh nasal spray  -- 1 spray(s) into nose 2 times a day  -- Indication: For Allergies    Fish Oil 1000 mg oral capsule  -- 1 cap(s) by mouth 2 times a day  -- Indication: For prevention    Chondroitin-Glucosamine 200 mg-250 mg oral capsule  -- 1 cap(s) by mouth 2 times a day  -- Indication: For prevention    pantoprazole 40 mg oral delayed release tablet  -- 1 tab(s) by mouth 2 times a day (before meals)  -- Indication: For gerd    hydrALAZINE  -- 150 milligram(s) by mouth 2 times a day  -- Indication: For Hypertension    Vitamin B Compound Strong  -- 1 tab(s) by mouth once a day  -- Indication: For vitamin    Vitamin C 1000 mg oral tablet  -- 1 tab(s) by mouth once a day  -- Indication: For vitamin    Vitamin D3 2000 intl units oral capsule  -- 1 cap(s) by mouth once a day  -- Indication: For vitamin

## 2017-05-23 NOTE — PROGRESS NOTE ADULT - SUBJECTIVE AND OBJECTIVE BOX
NEPHROLOGY INTERVAL HPI/OVERNIGHT EVENTS:  no new complaints. doing well.  tolerating po    MEDICATIONS  (STANDING):  buDESOnide   0.5 milliGRAM(s) Respule 0.5milliGRAM(s) Inhalation two times a day  doxazosin 8milliGRAM(s) Oral at bedtime  isosorbide   mononitrate ER Tablet (IMDUR) 120milliGRAM(s) Oral daily  diltiazem   CD 120milliGRAM(s) Oral daily  gabapentin 300milliGRAM(s) Oral daily  allopurinol 100milliGRAM(s) Oral daily  fenofibrate Tablet 145milliGRAM(s) Oral daily  simvastatin 10milliGRAM(s) Oral at bedtime  pramipexole 0.125milliGRAM(s) Oral three times a day  ALBUTerol    90 MICROgram(s) HFA Inhaler 2Puff(s) Inhalation every 6 hours  fluticasone propionate 50 MICROgram(s)/spray Nasal Spray 1Spray(s) Both Nostrils two times a day  metoprolol 12.5milliGRAM(s) Oral two times a day  hydrALAZINE 150milliGRAM(s) Oral two times a day  lactobacillus acidophilus 1Tablet(s) Oral daily  gabapentin 600milliGRAM(s) Oral at bedtime  pantoprazole  Injectable 40milliGRAM(s) IV Push every 12 hours  docusate sodium 100milliGRAM(s) Oral two times a day  senna 2Tablet(s) Oral at bedtime  furosemide    Tablet 80milliGRAM(s) Oral daily    MEDICATIONS  (PRN):  aluminum hydroxide/magnesium hydroxide/simethicone Suspension 30milliLiter(s) Oral every 6 hours PRN Dyspepsia  phenol 1.4% (CHLORASEPTIC) Oral Spray 2Spray(s) Topical three times a day PRN mouth  care  oxyCODONE  5 mG/acetaminophen 325 mG 1Tablet(s) Oral every 4 hours PRN Moderate Pain (4 - 6)  oxyCODONE IR 10milliGRAM(s) Oral every 4 hours PRN Severe Pain (7 - 10)      Allergies    No Known Allergies    Intolerances        I&O's Detail  I & Os for 24h ending 22 May 2017 07:00  =============================================  IN:    Total IN: 0 ml  ---------------------------------------------  OUT:    Voided: 700 ml    Total OUT: 700 ml  ---------------------------------------------  Total NET: -700 ml    I & Os for current day (as of 22 May 2017 14:27)  =============================================  IN:    Oral Fluid: 240 ml    Total IN: 240 ml  ---------------------------------------------  OUT:    Voided: 200 ml    Total OUT: 200 ml  ---------------------------------------------  Total NET: 40 ml      Vital Signs Last 24 Hrs  T(C): 36.9, Max: 36.9   T(F): 98.4, Max: 98.4   HR: 77 (77 - 88)  BP: 131/98 (131/98 - 139/44)  BP(mean): --  RR: 18 (18 - 18)  SpO2: 98% (98% - 99%)    PHYSICAL EXAM:  General: alert. awake Ox3  HEENT: MMM  CV: s1s2 rrr  LUNGS: B/L CTA  EXT: LE dressing in place    05-23    148<H>  |  113<H>  |  55<H>  ----------------------------<  87  3.6   |  26  |  1.15    Ca    8.2<L>      23 May 2017 08:24                          10.2   6.1   )-----------( 282      ( 23 May 2017 08:24 )             33.3

## 2017-05-23 NOTE — DISCHARGE NOTE ADULT - ADDITIONAL INSTRUCTIONS
DRESSING CHANGES AND WOUND CARE INSTRUCTIONS:   R groin dressings daily: paint incision with betadine and then cover with dry dressing    R foot and leg dressings daily: xeroform gauze to ulcerated areas, cover with dry gauze, secure with kerlex; dry gauze between toes    Follow up with Dr. Cardenas outpatient and GI. Get repeat CBC in 1 week to assess anemia level. Resume baby aspirin daily. DRESSING CHANGES AND WOUND CARE INSTRUCTIONS:   Right groin dressings daily: paint incision with betadine and then cover with dry dressing    Right foot and leg dressings daily: xeroform gauze to ulcerated areas, cover with dry gauze, secure with kerlex; dry gauze between toes    Follow up with Dr. Cardenas outpatient and GI. Get repeat CBC in 1 week to assess anemia level. Resume baby aspirin daily.

## 2017-05-23 NOTE — DISCHARGE NOTE ADULT - CARE PLAN
Principal Discharge DX:	Anemia due to blood loss  Goal:	Stable Hemoglobin level.  Instructions for follow-up, activity and diet:	Get repeat CBC in 1 week.  Secondary Diagnosis:	S/P angioplasty  Goal:	Wound care  Instructions for follow-up, activity and diet:	Wound care instructions listed below.

## 2017-05-23 NOTE — PROGRESS NOTE ADULT - SUBJECTIVE AND OBJECTIVE BOX
no change    MEDICATIONS  (STANDING):  buDESOnide   0.5 milliGRAM(s) Respule 0.5milliGRAM(s) Inhalation two times a day  doxazosin 8milliGRAM(s) Oral at bedtime  isosorbide   mononitrate ER Tablet (IMDUR) 120milliGRAM(s) Oral daily  diltiazem   CD 120milliGRAM(s) Oral daily  gabapentin 300milliGRAM(s) Oral daily  allopurinol 100milliGRAM(s) Oral daily  fenofibrate Tablet 145milliGRAM(s) Oral daily  simvastatin 10milliGRAM(s) Oral at bedtime  pramipexole 0.125milliGRAM(s) Oral three times a day  ALBUTerol    90 MICROgram(s) HFA Inhaler 2Puff(s) Inhalation every 6 hours  fluticasone propionate 50 MICROgram(s)/spray Nasal Spray 1Spray(s) Both Nostrils two times a day  metoprolol 12.5milliGRAM(s) Oral two times a day  hydrALAZINE 150milliGRAM(s) Oral two times a day  lactobacillus acidophilus 1Tablet(s) Oral daily  gabapentin 600milliGRAM(s) Oral at bedtime  pantoprazole  Injectable 40milliGRAM(s) IV Push every 12 hours  docusate sodium 100milliGRAM(s) Oral two times a day  senna 2Tablet(s) Oral at bedtime  furosemide    Tablet 80milliGRAM(s) Oral daily    MEDICATIONS  (PRN):  aluminum hydroxide/magnesium hydroxide/simethicone Suspension 30milliLiter(s) Oral every 6 hours PRN Dyspepsia  phenol 1.4% (CHLORASEPTIC) Oral Spray 2Spray(s) Topical three times a day PRN mouth  care  oxyCODONE  5 mG/acetaminophen 325 mG 1Tablet(s) Oral every 4 hours PRN Moderate Pain (4 - 6)  oxyCODONE IR 10milliGRAM(s) Oral every 4 hours PRN Severe Pain (7 - 10)      Allergies    No Known Allergies    Intolerances        Flatus: [ ] YES [ ] NO             Bowel Movement: [ ] YES [ ] NO  Pain (0-10):            Pain Control Adequate: [ ] YES [ ] NO  Nausea: [ ] YES [ ] NO            Vomiting: [ ] YES [ ] NO  Diarrhea: [ ] YES [ ] NO         Constipation: [ ] YES [ ] NO     Chest Pain: [ ] YES [ ] NO    SOB:  [ ] YES [ ] NO    Vital Signs Last 24 Hrs  T(C): 37.6, Max: 37.6 (05-23 @ 05:30)  T(F): 99.6, Max: 99.6 (05-23 @ 05:30)  HR: 88 (71 - 88)  BP: 156/64 (145/40 - 156/64)  BP(mean): --  RR: 18 (18 - 18)  SpO2: 95% (95% - 98%)    I&O's Summary    I & Os for current day (as of 23 May 2017 08:57)  =============================================  IN: 240 ml / OUT: 650 ml / NET: -410 ml      Physical Exam:  General: NAD, resting comfortably  Pulmonary: normal resp effort, CTA-B  Cardiovascular: NSR  Abdominal: soft, NT/ND  Extremities: WWP, normal strength  Neuro: A/O x 3, CNs II-XII grossly intact, normal motor/sensation, no focal deficits  Pulses:   Right:                                                                          Left:  R foot dressing clean and dry; toes pink  LABS:                        10.2   6.1   )-----------( 282      ( 23 May 2017 08:24 )             33.3     05-23    148<H>  |  113<H>  |  55<H>  ----------------------------<  87  3.6   |  26  |  1.15    Ca    8.2<L>      23 May 2017 08:24            CAPILLARY BLOOD GLUCOSE      RADIOLOGY & ADDITIONAL TESTS:

## 2017-05-23 NOTE — DISCHARGE NOTE ADULT - CARE PROVIDER_API CALL
Devante Hernández), Gastroenterology  [address] [place] [address]  Phone: [phone]  Fax: [phone]    John Wolff (MD), Cardiovascular Disease; Internal Medicine; Interventional Cardiology  [address] [place] [address]  Phone: [phone]  Fax: [phone]    Jason Clement), Vascular Surgery  [address] [facility] [place] [address]  Phone: [phone]  Fax: [phone]

## 2017-05-23 NOTE — PROGRESS NOTE ADULT - ASSESSMENT
anemia, likely multifactorial  I suspect that there is a component of GI bleeding, appears stabilized on PPI  and hct stable    will check HCT and serial H H       He has received multiple units of packed red blood cells     protonix po bid   Serial H&H'    lower extremity DVT, status post IVC filter    Sepsis likely secondary to lower extremity cellulitis, resolved    Advanced COPD and obstructive sleep apnea reviewed.      discussed with hospitalist. Patient at increased risk of stent obstruction without antiplatelet agents. Furthermore, also increased risk of bleeding with antiplatelet agents. Message left for family. Hospitalist to discuss with family as well.      DW  Dr Henry, inc risk bleed with anti plt and S/P debridement of foot

## 2017-05-24 VITALS
DIASTOLIC BLOOD PRESSURE: 46 MMHG | RESPIRATION RATE: 18 BRPM | OXYGEN SATURATION: 100 % | SYSTOLIC BLOOD PRESSURE: 137 MMHG | HEART RATE: 87 BPM | TEMPERATURE: 98 F

## 2017-05-24 RX ADMIN — ISOSORBIDE MONONITRATE 120 MILLIGRAM(S): 60 TABLET, EXTENDED RELEASE ORAL at 11:07

## 2017-05-24 RX ADMIN — OXYCODONE HYDROCHLORIDE 10 MILLIGRAM(S): 5 TABLET ORAL at 09:29

## 2017-05-24 RX ADMIN — GABAPENTIN 300 MILLIGRAM(S): 400 CAPSULE ORAL at 11:06

## 2017-05-24 RX ADMIN — Medication 0.5 MILLIGRAM(S): at 08:09

## 2017-05-24 RX ADMIN — OXYCODONE HYDROCHLORIDE 10 MILLIGRAM(S): 5 TABLET ORAL at 15:09

## 2017-05-24 RX ADMIN — Medication 81 MILLIGRAM(S): at 11:06

## 2017-05-24 RX ADMIN — PRAMIPEXOLE DIHYDROCHLORIDE 0.12 MILLIGRAM(S): 0.12 TABLET ORAL at 14:01

## 2017-05-24 RX ADMIN — Medication 12.5 MILLIGRAM(S): at 11:05

## 2017-05-24 RX ADMIN — Medication 1 SPRAY(S): at 06:14

## 2017-05-24 RX ADMIN — PANTOPRAZOLE SODIUM 40 MILLIGRAM(S): 20 TABLET, DELAYED RELEASE ORAL at 06:08

## 2017-05-24 RX ADMIN — PRAMIPEXOLE DIHYDROCHLORIDE 0.12 MILLIGRAM(S): 0.12 TABLET ORAL at 06:08

## 2017-05-24 RX ADMIN — Medication 1 TABLET(S): at 11:09

## 2017-05-24 RX ADMIN — Medication 150 MILLIGRAM(S): at 06:08

## 2017-05-24 RX ADMIN — Medication 100 MILLIGRAM(S): at 06:14

## 2017-05-24 RX ADMIN — Medication 145 MILLIGRAM(S): at 11:06

## 2017-05-24 RX ADMIN — Medication 100 MILLIGRAM(S): at 11:06

## 2017-05-24 RX ADMIN — ALBUTEROL 2 PUFF(S): 90 AEROSOL, METERED ORAL at 08:10

## 2017-05-24 RX ADMIN — Medication 80 MILLIGRAM(S): at 06:14

## 2017-05-24 RX ADMIN — Medication 120 MILLIGRAM(S): at 11:04

## 2017-05-24 NOTE — PROGRESS NOTE ADULT - PROVIDER SPECIALTY LIST ADULT
Cardiology
Gastroenterology
Hospitalist
Infectious Disease
Nephrology
Pulmonology
Vascular Surgery
Nephrology

## 2017-05-24 NOTE — PROGRESS NOTE ADULT - SUBJECTIVE AND OBJECTIVE BOX
CC: Anemia, LE wounds.     HPI: 82 year old male with history of CAD s/p stents (LAD, RCA bare metal around 9/16), A.Fib not on anticoagulation due to GI bleeding, Hypertension, COPD on home O2 2L, SHAYY on nocturnal BIPAP, ex-smoker (smoked 1ppd X 50 years, quit 22 years ago),  Chronic diastolic CHF, Hyperlipidemia, PVD, Iron deficiency anemia, Chronic back pain, Gout, BPH, CKD III with baseline Cr 2, recently admitted to  in 4/17 with anemia of GI bleeding, RLE and RUE DVTs, received pRBCs and IVC filter discharged to rehab with aspirin was sent to  ER on 5/4/17 for worsening anemia with Hb 6, worsening leg pain from ulcers and worsening renal function Cr 3.99. In ED, + guaiac.     Hospital course: prolonged including stabilization of suspected GIB with RBC transfusion and PPI BID. EGD on hold given ongoing LE wounds and angioplasty this admission. Patient underwent RLE stent and angioplasty with Vascular and prolonged IV Cefepime for wounds. HD stable.     5/20/17: Seen post op from RLE surgical debridement. No CP or SOB. Pain controlled currently.     5/21/17: Feels ok, no lower extremity pain. Was sitting in chair earlier. Need to clarify weight bearing status with Vascular.     5/22/17: Seen w/ daughter in room. Worked a little with PT but c/o of severe RLE pain. No CP.   5/23: Status quo. Awaiting placement. Offers no complaints currently.    ROS: stated above.     Vital Signs Last 24 Hrs  T(C): 36.7, Max: 36.9 (05-23 @ 16:43)  T(F): 98.1, Max: 98.4 (05-23 @ 16:43)  HR: 87 (77 - 92)  BP: 137/46 (131/98 - 139/42)  BP(mean): --  RR: 18 (18 - 18)  SpO2: 100% (98% - 100%)    Physical exam:  Appears chronically ill and obese male in NAD, awake and alert in moderate pain.   HEENT: PERRLA  Lungs- decrease air entry at bases, no active wheezing.   S1S2, normal rhthym  Abd- soft, NT, BS+, obese and obese.   Ext- dressing both legs,. + 2 LE edema. RLE in ace-bandage, notable swelling.    Neuro- AAOx3    LABS:                                 10.2   6.1   )-----------( 282      ( 23 May 2017 08:24 )             33.3     05-23    148<H>  |  113<H>  |  55<H>  ----------------------------<  87  3.6   |  26  |  1.15    Ca    8.2<L>      23 May 2017 08:24      CAPILLARY BLOOD GLUCOSE    MEDICATIONS  (STANDING):  buDESOnide   0.5 milliGRAM(s) Respule 0.5milliGRAM(s) Inhalation two times a day  doxazosin 8milliGRAM(s) Oral at bedtime  isosorbide   mononitrate ER Tablet (IMDUR) 120milliGRAM(s) Oral daily  diltiazem   CD 120milliGRAM(s) Oral daily  gabapentin 300milliGRAM(s) Oral daily  allopurinol 100milliGRAM(s) Oral daily  fenofibrate Tablet 145milliGRAM(s) Oral daily  simvastatin 10milliGRAM(s) Oral at bedtime  pramipexole 0.125milliGRAM(s) Oral three times a day  ALBUTerol    90 MICROgram(s) HFA Inhaler 2Puff(s) Inhalation every 6 hours  fluticasone propionate 50 MICROgram(s)/spray Nasal Spray 1Spray(s) Both Nostrils two times a day  metoprolol 12.5milliGRAM(s) Oral two times a day  hydrALAZINE 150milliGRAM(s) Oral two times a day  lactobacillus acidophilus 1Tablet(s) Oral daily  gabapentin 600milliGRAM(s) Oral at bedtime  pantoprazole  Injectable 40milliGRAM(s) IV Push every 12 hours  docusate sodium 100milliGRAM(s) Oral two times a day  senna 2Tablet(s) Oral at bedtime  furosemide    Tablet 80milliGRAM(s) Oral daily  aspirin  chewable 81milliGRAM(s) Oral daily    MEDICATIONS  (PRN):  aluminum hydroxide/magnesium hydroxide/simethicone Suspension 30milliLiter(s) Oral every 6 hours PRN Dyspepsia  phenol 1.4% (CHLORASEPTIC) Oral Spray 2Spray(s) Topical three times a day PRN mouth  care  oxyCODONE  5 mG/acetaminophen 325 mG 1Tablet(s) Oral every 4 hours PRN Moderate Pain (4 - 6)  oxyCODONE IR 10milliGRAM(s) Oral every 4 hours PRN Severe Pain (7 - 10)      Assessment/Plan:   This is an 82 year old male with history of CAD s/p stents (LAD, RCA bare metal around 9/16), A.Fib not on anticoagulation due to GI bleeding, Hypertension, COPD on home O2 2L,   Chronic diastolic CHF, Hyperlipidemia, PVD, CKD III with baseline Cr 2 admitted for anemia and GIB now stable with ongoing LE wounds s/p angioplasty with vascular surgery completed abx:     #Severe PAD.   - 05/11 endarterectomy of CFA plaque in an attempt of limb preservation.  05/08 angiogram + angioplasty.  stenting of right common iliac + external iliac arteries.  statin.   - 5/20: repeat RLE debridement of black ischar.   - on statin. No Asp/ plavix as yet given concern for GI bleed (no H&H stable for days) --> WILL DEFER to GI and Vascular regarding safety of starting anti-platelet therapy.  - Educated on incentive spirometry use post op.   - pain control, IV pain meds --> needs to be controlled on Oral prior to dc. On Percocet q4hr prn.   - awaiting clarification of weight bearing status. PT. Will need rehab.     #chronic right LE stasis ulcers, dermatitis w/ cellulitis.    - S/p 2 weeks of IV Cefepime, completed on 5/16.     #GIB/acute blood loss anemia.  s/p 5 units PRBCs. No plans for EGD given ongoing resp issues on chronic O2.   Cont PPI BID per GI. Hb ~10. STABLE.   GI f/u appreciated  - daily CBC. To decide on starting antiplatelet therapy in the setting of LE stent if placed later this week.       #ARYA upon CKD.  Cr improved, back on Lasix --> switched to PO on 5/17.   Nephrology following.    # Chronic Hypernatremia - oral intake encouraged w/ free water.     #multiple DVT RUE/RLL.  s/p IVC Filter.    DVT ppx: no pharmacological ppx 2/2 GIB, no venodynes 2/2 LLE wounds. Will start mobilization as tolerated.     Dispo:  -d/c today    Attending Statement:  >35 minutes spent on total encounter; more than 50% of the visit was spent counseling and/or coordinating care by the attending physician.

## 2017-05-24 NOTE — PROGRESS NOTE ADULT - SUBJECTIVE AND OBJECTIVE BOX
Patient is a 82y old  Male who presents with a chief complaint of Anemia, Leg swelling (23 May 2017 13:02)      HPI:  82 year old male with history of CAD s/p stents (LAD, RCA bare metal around 9/16), A.Fib not on anticoagulation due to GI bleeding, Hypertension, COPD on home O2 2L, SHAYY on nocturnal BIPAP, ex-smoker (smoked 1ppd X 50 years, quit 22 years ago),  Chronic diastolic CHF, Hyperlipidemia, PVD, Iron deficiency anemia, Chronic back pain, Gout, BPH, CKD III with baseline Cr 2, recently admitted to  in 4/17 with anemia of GI bleeding, RLE and RUE DVTs, received pRBCs and IVC filter discharged to rehab with aspirin was sent to  ER on 5/4/17 for worsening anemia with Hb 6, worsening leg pain from ulcers and worsening renal function Cr 3.99.    Pt seen bed side.  Not well oriented currently after receiving Dilaudid for pain in ER.  As per daughters, pt was walking with a walker at rehab and was doing better.  Pt was found to have tarry black stools in ER, guaic positive. (04 May 2017 15:56)    patient is comfortable. He denies any abdominal pain. He denies any nausea vomiting fevers chills diaphoresis.  Bowel movement was brown.      PAST MEDICAL & SURGICAL HISTORY:  Sepsis, due to unspecified organism: 2/2 poorly healing wounds b/l  BPH (benign prostatic hypertrophy)  Hyperlipemia  Coronary artery disease  Hypertension  Dyspepsia: On moderate exertion.  Sleep apnea, obstructive: Requires home 02 therapy, and treatment with BIPAP  Atelectasis  Pleural effusion, bilateral  Respiratory failure  Peripheral edema  CRI (chronic renal insufficiency)  Gout  CRF (chronic renal failure)  Benign prostatic hypertrophy  Spinal stenosis  Hypercholesterolemia  GERD (gastroesophageal reflux disease)  CAD (coronary artery disease)  Hypertension  S/P angioplasty with stent  Cataract of left eye  Prostate: Surgery green light procedure.  S/P rotator cuff surgery: Right  S/P angioplasty  Rotator cuff tear, right: repair      MEDICATIONS  (STANDING):  buDESOnide   0.5 milliGRAM(s) Respule 0.5milliGRAM(s) Inhalation two times a day  doxazosin 8milliGRAM(s) Oral at bedtime  isosorbide   mononitrate ER Tablet (IMDUR) 120milliGRAM(s) Oral daily  diltiazem   CD 120milliGRAM(s) Oral daily  gabapentin 300milliGRAM(s) Oral daily  allopurinol 100milliGRAM(s) Oral daily  fenofibrate Tablet 145milliGRAM(s) Oral daily  simvastatin 10milliGRAM(s) Oral at bedtime  pramipexole 0.125milliGRAM(s) Oral three times a day  ALBUTerol    90 MICROgram(s) HFA Inhaler 2Puff(s) Inhalation every 6 hours  fluticasone propionate 50 MICROgram(s)/spray Nasal Spray 1Spray(s) Both Nostrils two times a day  metoprolol 12.5milliGRAM(s) Oral two times a day  hydrALAZINE 150milliGRAM(s) Oral two times a day  lactobacillus acidophilus 1Tablet(s) Oral daily  gabapentin 600milliGRAM(s) Oral at bedtime  pantoprazole  Injectable 40milliGRAM(s) IV Push every 12 hours  docusate sodium 100milliGRAM(s) Oral two times a day  senna 2Tablet(s) Oral at bedtime  furosemide    Tablet 80milliGRAM(s) Oral daily  aspirin  chewable 81milliGRAM(s) Oral daily    MEDICATIONS  (PRN):  aluminum hydroxide/magnesium hydroxide/simethicone Suspension 30milliLiter(s) Oral every 6 hours PRN Dyspepsia  phenol 1.4% (CHLORASEPTIC) Oral Spray 2Spray(s) Topical three times a day PRN mouth  care  oxyCODONE  5 mG/acetaminophen 325 mG 1Tablet(s) Oral every 4 hours PRN Moderate Pain (4 - 6)  oxyCODONE IR 10milliGRAM(s) Oral every 4 hours PRN Severe Pain (7 - 10)      Allergies    No Known Allergies    Intolerances        SOCIAL HISTORY:unchanged    FAMILY HISTORY:  No pertinent family history in first degree relatives      REVIEW OF SYSTEMS:    CONSTITUTIONAL: No weakness, fevers or chills  EYES/ENT: No visual changes;  No vertigo or throat pain   NECK: No pain or stiffness  RESPIRATORY: No cough, wheezing, hemoptysis; No shortness of breath  CARDIOVASCULAR: No chest pain or palpitations  GENITOURINARY: No dysuria, frequency or hematuria  NEUROLOGICAL: No numbness or weakness  SKIN: No itching, burning, rashes, or lesions   All other review of systems is negative unless indicated above.    Vital Signs Last 24 Hrs  T(C): 36.7, Max: 36.9 (05-24 @ 05:24)  T(F): 98.1, Max: 98.4 (05-24 @ 05:24)  HR: 87 (77 - 92)  BP: 137/46 (131/98 - 138/50)  BP(mean): --  RR: 18 (18 - 18)  SpO2: 100% (98% - 100%)    PHYSICAL EXAM:    Constitutional: NAD, well-developed  HEENT: EOMI, throat clear  Neck: No LAD, supple  Respiratory: CTA and P  Cardiovascular: S1 and S2, RRR, no M  Gastrointestinal: BS+, soft, NT/ND, neg HSM,  Extremities: No peripheral edema, neg clubing, cyanosis  Vascular: 2+ peripheral pulses  Neurological: A/O x 3, no focal deficits  Psychiatric: Normal mood, normal affect  Skin: No rashes    LABS:  CBC Full  -  ( 23 May 2017 08:24 )  WBC Count : 6.1 K/uL  Hemoglobin : 10.2 g/dL  Hematocrit : 33.3 %  Platelet Count - Automated : 282 K/uL  Mean Cell Volume : 92.7 fl  Mean Cell Hemoglobin : 28.3 pg  Mean Cell Hemoglobin Concentration : 30.5 gm/dL  Auto Neutrophil # : x  Auto Lymphocyte # : x  Auto Monocyte # : x  Auto Eosinophil # : x  Auto Basophil # : x  Auto Neutrophil % : x  Auto Lymphocyte % : x  Auto Monocyte % : x  Auto Eosinophil % : x  Auto Basophil % : x    05-23    148<H>  |  113<H>  |  55<H>  ----------------------------<  87  3.6   |  26  |  1.15    Ca    8.2<L>      23 May 2017 08:24              RADIOLOGY & ADDITIONAL STUDIES:

## 2017-05-24 NOTE — PROGRESS NOTE ADULT - ASSESSMENT
anemia, likely multifactorial  I suspect that there is a component of GI bleeding, appears stabilized on PPI  and hct stable    He has received multiple units of packed red blood cells     protonix po bid   Serial H&H'    lower extremity DVT, status post IVC filter    Sepsis likely secondary to lower extremity cellulitis, resolved    Advanced COPD and obstructive sleep apnea reviewed.      discussed with hospitalist. Patient at increased risk of stent obstruction without antiplatelet agents. Furthermore, also increased risk of bleeding with antiplatelet agents. Message left for family. Hospitalist to discuss with family as well.  I discussed options with patient. He is considering aspirin

## 2017-05-24 NOTE — PROGRESS NOTE ADULT - SUBJECTIVE AND OBJECTIVE BOX
no signficant changes    MEDICATIONS  (STANDING):  buDESOnide   0.5 milliGRAM(s) Respule 0.5milliGRAM(s) Inhalation two times a day  doxazosin 8milliGRAM(s) Oral at bedtime  isosorbide   mononitrate ER Tablet (IMDUR) 120milliGRAM(s) Oral daily  diltiazem   CD 120milliGRAM(s) Oral daily  gabapentin 300milliGRAM(s) Oral daily  allopurinol 100milliGRAM(s) Oral daily  fenofibrate Tablet 145milliGRAM(s) Oral daily  simvastatin 10milliGRAM(s) Oral at bedtime  pramipexole 0.125milliGRAM(s) Oral three times a day  ALBUTerol    90 MICROgram(s) HFA Inhaler 2Puff(s) Inhalation every 6 hours  fluticasone propionate 50 MICROgram(s)/spray Nasal Spray 1Spray(s) Both Nostrils two times a day  metoprolol 12.5milliGRAM(s) Oral two times a day  hydrALAZINE 150milliGRAM(s) Oral two times a day  lactobacillus acidophilus 1Tablet(s) Oral daily  gabapentin 600milliGRAM(s) Oral at bedtime  pantoprazole  Injectable 40milliGRAM(s) IV Push every 12 hours  docusate sodium 100milliGRAM(s) Oral two times a day  senna 2Tablet(s) Oral at bedtime  furosemide    Tablet 80milliGRAM(s) Oral daily  aspirin  chewable 81milliGRAM(s) Oral daily    MEDICATIONS  (PRN):  aluminum hydroxide/magnesium hydroxide/simethicone Suspension 30milliLiter(s) Oral every 6 hours PRN Dyspepsia  phenol 1.4% (CHLORASEPTIC) Oral Spray 2Spray(s) Topical three times a day PRN mouth  care  oxyCODONE  5 mG/acetaminophen 325 mG 1Tablet(s) Oral every 4 hours PRN Moderate Pain (4 - 6)  oxyCODONE IR 10milliGRAM(s) Oral every 4 hours PRN Severe Pain (7 - 10)      Allergies    No Known Allergies    Intolerances        Flatus: [ ] YES [ ] NO             Bowel Movement: [ ] YES [ ] NO  Pain (0-10):            Pain Control Adequate: [ ] YES [ ] NO  Nausea: [ ] YES [ ] NO            Vomiting: [ ] YES [ ] NO  Diarrhea: [ ] YES [ ] NO         Constipation: [ ] YES [ ] NO     Chest Pain: [ ] YES [ ] NO    SOB:  [ ] YES [ ] NO    Vital Signs Last 24 Hrs  T(C): 36.9, Max: 36.9 (05-23 @ 16:43)  T(F): 98.4, Max: 98.4 (05-23 @ 16:43)  HR: 77 (77 - 88)  BP: 131/98 (131/98 - 139/44)  BP(mean): --  RR: 18 (18 - 18)  SpO2: 98% (98% - 99%)    I&O's Summary    I & Os for current day (as of 24 May 2017 07:57)  =============================================  IN: 120 ml / OUT: 0 ml / NET: 120 ml      Physical Exam:  General: NAD, resting comfortably  Pulmonary: normal resp effort, CTA-B  Cardiovascular: NSR  Abdominal: soft, NT/ND  Extremities: WWP, normal strength  Neuro: A/O x 3, CNs II-XII grossly intact, normal motor/sensation, no focal deficits  Pulses:   Right:                                                                          Left:  R foot dressing dry and intact  LABS:                        10.2   6.1   )-----------( 282      ( 23 May 2017 08:24 )             33.3     05-23    148<H>  |  113<H>  |  55<H>  ----------------------------<  87  3.6   |  26  |  1.15    Ca    8.2<L>      23 May 2017 08:24            CAPILLARY BLOOD GLUCOSE      RADIOLOGY & ADDITIONAL TESTS:

## 2017-05-25 LAB — SURGICAL PATHOLOGY FINAL REPORT - CH: SIGNIFICANT CHANGE UP

## 2017-05-26 DIAGNOSIS — I25.2 OLD MYOCARDIAL INFARCTION: ICD-10-CM

## 2017-05-26 DIAGNOSIS — N17.9 ACUTE KIDNEY FAILURE, UNSPECIFIED: ICD-10-CM

## 2017-05-26 DIAGNOSIS — I70.298 OTHER ATHEROSCLEROSIS OF NATIVE ARTERIES OF EXTREMITIES, OTHER EXTREMITY: ICD-10-CM

## 2017-05-26 DIAGNOSIS — L03.115 CELLULITIS OF RIGHT LOWER LIMB: ICD-10-CM

## 2017-05-26 DIAGNOSIS — K13.79 OTHER LESIONS OF ORAL MUCOSA: ICD-10-CM

## 2017-05-26 DIAGNOSIS — I25.10 ATHEROSCLEROTIC HEART DISEASE OF NATIVE CORONARY ARTERY WITHOUT ANGINA PECTORIS: ICD-10-CM

## 2017-05-26 DIAGNOSIS — I50.9 HEART FAILURE, UNSPECIFIED: ICD-10-CM

## 2017-05-26 DIAGNOSIS — D62 ACUTE POSTHEMORRHAGIC ANEMIA: ICD-10-CM

## 2017-05-26 DIAGNOSIS — N50.89 OTHER SPECIFIED DISORDERS OF THE MALE GENITAL ORGANS: ICD-10-CM

## 2017-05-26 DIAGNOSIS — G47.33 OBSTRUCTIVE SLEEP APNEA (ADULT) (PEDIATRIC): ICD-10-CM

## 2017-05-26 DIAGNOSIS — J44.9 CHRONIC OBSTRUCTIVE PULMONARY DISEASE, UNSPECIFIED: ICD-10-CM

## 2017-05-26 DIAGNOSIS — I48.0 PAROXYSMAL ATRIAL FIBRILLATION: ICD-10-CM

## 2017-05-26 DIAGNOSIS — A41.9 SEPSIS, UNSPECIFIED ORGANISM: ICD-10-CM

## 2017-05-26 DIAGNOSIS — E87.0 HYPEROSMOLALITY AND HYPERNATREMIA: ICD-10-CM

## 2017-05-26 DIAGNOSIS — N18.4 CHRONIC KIDNEY DISEASE, STAGE 4 (SEVERE): ICD-10-CM

## 2017-05-26 DIAGNOSIS — Z95.5 PRESENCE OF CORONARY ANGIOPLASTY IMPLANT AND GRAFT: ICD-10-CM

## 2017-05-26 DIAGNOSIS — I13.0 HYPERTENSIVE HEART AND CHRONIC KIDNEY DISEASE WITH HEART FAILURE AND STAGE 1 THROUGH STAGE 4 CHRONIC KIDNEY DISEASE, OR UNSPECIFIED CHRONIC KIDNEY DISEASE: ICD-10-CM

## 2017-05-26 DIAGNOSIS — M79.606 PAIN IN LEG, UNSPECIFIED: ICD-10-CM

## 2017-05-26 DIAGNOSIS — M10.9 GOUT, UNSPECIFIED: ICD-10-CM

## 2017-05-28 DIAGNOSIS — I96 GANGRENE, NOT ELSEWHERE CLASSIFIED: ICD-10-CM

## 2017-05-28 DIAGNOSIS — I70.25 ATHEROSCLEROSIS OF NATIVE ARTERIES OF OTHER EXTREMITIES WITH ULCERATION: ICD-10-CM

## 2017-05-28 DIAGNOSIS — I77.1 STRICTURE OF ARTERY: ICD-10-CM

## 2017-06-06 DIAGNOSIS — N17.0 ACUTE KIDNEY FAILURE WITH TUBULAR NECROSIS: ICD-10-CM

## 2017-06-16 ENCOUNTER — INPATIENT (INPATIENT)
Facility: HOSPITAL | Age: 82
LOS: 21 days | Discharge: NURSING, REHAB OR LONG-TERM CARE | End: 2017-07-08
Attending: STUDENT IN AN ORGANIZED HEALTH CARE EDUCATION/TRAINING PROGRAM | Admitting: INTERNAL MEDICINE
Payer: MEDICARE

## 2017-06-16 VITALS
HEART RATE: 89 BPM | TEMPERATURE: 99 F | SYSTOLIC BLOOD PRESSURE: 138 MMHG | OXYGEN SATURATION: 96 % | RESPIRATION RATE: 20 BRPM | DIASTOLIC BLOOD PRESSURE: 78 MMHG

## 2017-06-16 DIAGNOSIS — Z95.9 PRESENCE OF CARDIAC AND VASCULAR IMPLANT AND GRAFT, UNSPECIFIED: Chronic | ICD-10-CM

## 2017-06-16 LAB
ALBUMIN SERPL ELPH-MCNC: 1.7 G/DL — LOW (ref 3.3–5)
ALP SERPL-CCNC: 56 U/L — SIGNIFICANT CHANGE UP (ref 40–120)
ALT FLD-CCNC: 8 U/L — LOW (ref 12–78)
ANION GAP SERPL CALC-SCNC: 7 MMOL/L — SIGNIFICANT CHANGE UP (ref 5–17)
ANISOCYTOSIS BLD QL: SLIGHT — SIGNIFICANT CHANGE UP
APTT BLD: 35.2 SEC — SIGNIFICANT CHANGE UP (ref 27.5–37.4)
AST SERPL-CCNC: 17 U/L — SIGNIFICANT CHANGE UP (ref 15–37)
BASOPHILS # BLD AUTO: 0.1 K/UL — SIGNIFICANT CHANGE UP (ref 0–0.2)
BASOPHILS NFR BLD AUTO: 1.1 % — SIGNIFICANT CHANGE UP (ref 0–2)
BILIRUB SERPL-MCNC: 0.2 MG/DL — SIGNIFICANT CHANGE UP (ref 0.2–1.2)
BLD GP AB SCN SERPL QL: SIGNIFICANT CHANGE UP
BUN SERPL-MCNC: 55 MG/DL — HIGH (ref 7–23)
CALCIUM SERPL-MCNC: 8.2 MG/DL — LOW (ref 8.5–10.1)
CHLORIDE SERPL-SCNC: 112 MMOL/L — HIGH (ref 96–108)
CO2 SERPL-SCNC: 27 MMOL/L — SIGNIFICANT CHANGE UP (ref 22–31)
CREAT SERPL-MCNC: 1.75 MG/DL — HIGH (ref 0.5–1.3)
ELLIPTOCYTES BLD QL SMEAR: SLIGHT — SIGNIFICANT CHANGE UP
EOSINOPHIL # BLD AUTO: 0.2 K/UL — SIGNIFICANT CHANGE UP (ref 0–0.5)
EOSINOPHIL NFR BLD AUTO: 2.5 % — SIGNIFICANT CHANGE UP (ref 0–6)
GLUCOSE SERPL-MCNC: 116 MG/DL — HIGH (ref 70–99)
HCT VFR BLD CALC: 25.9 % — LOW (ref 39–50)
HGB BLD-MCNC: 8.5 G/DL — LOW (ref 13–17)
HYPOCHROMIA BLD QL: SLIGHT — SIGNIFICANT CHANGE UP
INR BLD: 1.27 RATIO — HIGH (ref 0.88–1.16)
LYMPHOCYTES # BLD AUTO: 0.7 K/UL — LOW (ref 1–3.3)
LYMPHOCYTES # BLD AUTO: 8.2 % — LOW (ref 13–44)
MANUAL DIF COMMENT BLD-IMP: SIGNIFICANT CHANGE UP
MCHC RBC-ENTMCNC: 28.9 PG — SIGNIFICANT CHANGE UP (ref 27–34)
MCHC RBC-ENTMCNC: 32.8 GM/DL — SIGNIFICANT CHANGE UP (ref 32–36)
MCV RBC AUTO: 88.2 FL — SIGNIFICANT CHANGE UP (ref 80–100)
MICROCYTES BLD QL: SLIGHT — SIGNIFICANT CHANGE UP
MONOCYTES # BLD AUTO: 0.7 K/UL — SIGNIFICANT CHANGE UP (ref 0–0.9)
MONOCYTES NFR BLD AUTO: 8.6 % — SIGNIFICANT CHANGE UP (ref 2–14)
NEUTROPHILS # BLD AUTO: 6.5 K/UL — SIGNIFICANT CHANGE UP (ref 1.8–7.4)
NEUTROPHILS NFR BLD AUTO: 79.7 % — HIGH (ref 43–77)
PLAT MORPH BLD: NORMAL — SIGNIFICANT CHANGE UP
PLATELET # BLD AUTO: 284 K/UL — SIGNIFICANT CHANGE UP (ref 150–400)
POIKILOCYTOSIS BLD QL AUTO: SLIGHT — SIGNIFICANT CHANGE UP
POTASSIUM SERPL-MCNC: 3.9 MMOL/L — SIGNIFICANT CHANGE UP (ref 3.5–5.3)
POTASSIUM SERPL-SCNC: 3.9 MMOL/L — SIGNIFICANT CHANGE UP (ref 3.5–5.3)
PROT SERPL-MCNC: 5.2 GM/DL — LOW (ref 6–8.3)
PROTHROM AB SERPL-ACNC: 13.8 SEC — HIGH (ref 9.8–12.7)
RBC # BLD: 2.94 M/UL — LOW (ref 4.2–5.8)
RBC # FLD: 15.8 % — HIGH (ref 10.3–14.5)
RBC BLD AUTO: (no result)
ROULEAUX BLD QL SMEAR: PRESENT — SIGNIFICANT CHANGE UP
SODIUM SERPL-SCNC: 146 MMOL/L — HIGH (ref 135–145)
TYPE + AB SCN PNL BLD: SIGNIFICANT CHANGE UP
WBC # BLD: 8.1 K/UL — SIGNIFICANT CHANGE UP (ref 3.8–10.5)
WBC # FLD AUTO: 8.1 K/UL — SIGNIFICANT CHANGE UP (ref 3.8–10.5)

## 2017-06-16 PROCEDURE — 93010 ELECTROCARDIOGRAM REPORT: CPT

## 2017-06-16 PROCEDURE — 71010: CPT | Mod: 26

## 2017-06-16 PROCEDURE — 99285 EMERGENCY DEPT VISIT HI MDM: CPT

## 2017-06-16 RX ORDER — SODIUM CHLORIDE 9 MG/ML
250 INJECTION INTRAMUSCULAR; INTRAVENOUS; SUBCUTANEOUS ONCE
Qty: 0 | Refills: 0 | Status: COMPLETED | OUTPATIENT
Start: 2017-06-16 | End: 2017-06-16

## 2017-06-16 RX ORDER — PANTOPRAZOLE SODIUM 20 MG/1
40 TABLET, DELAYED RELEASE ORAL ONCE
Qty: 0 | Refills: 0 | Status: COMPLETED | OUTPATIENT
Start: 2017-06-16 | End: 2017-06-16

## 2017-06-16 RX ORDER — PIPERACILLIN AND TAZOBACTAM 4; .5 G/20ML; G/20ML
3.38 INJECTION, POWDER, LYOPHILIZED, FOR SOLUTION INTRAVENOUS ONCE
Qty: 0 | Refills: 0 | Status: DISCONTINUED | OUTPATIENT
Start: 2017-06-16 | End: 2017-06-16

## 2017-06-16 RX ORDER — SODIUM CHLORIDE 9 MG/ML
1000 INJECTION INTRAMUSCULAR; INTRAVENOUS; SUBCUTANEOUS ONCE
Qty: 0 | Refills: 0 | Status: DISCONTINUED | OUTPATIENT
Start: 2017-06-16 | End: 2017-06-16

## 2017-06-16 RX ORDER — SODIUM CHLORIDE 9 MG/ML
2 INJECTION INTRAMUSCULAR; INTRAVENOUS; SUBCUTANEOUS ONCE
Qty: 0 | Refills: 0 | Status: DISCONTINUED | OUTPATIENT
Start: 2017-06-16 | End: 2017-06-16

## 2017-06-16 RX ADMIN — SODIUM CHLORIDE 250 MILLILITER(S): 9 INJECTION INTRAMUSCULAR; INTRAVENOUS; SUBCUTANEOUS at 20:59

## 2017-06-16 RX ADMIN — PANTOPRAZOLE SODIUM 40 MILLIGRAM(S): 20 TABLET, DELAYED RELEASE ORAL at 20:59

## 2017-06-16 NOTE — ED PROVIDER NOTE - GASTROINTESTINAL, MLM
Abdomen soft, non-tender, no guarding. RECTAL: no gross blood. black tarry stool. FOBT positive, lot [id number].

## 2017-06-16 NOTE — ED PROVIDER NOTE - SKIN, MLM
Stage 2 sacral decubitus ulcer. Chronic area with loss of tissue on right groin from surgery. Extensive chronic wounds to lower extremity, currently wrapped.

## 2017-06-16 NOTE — ED PROVIDER NOTE - PROGRESS NOTE DETAILS
Jorge FABIAN:  I s/w on call MD for Brittany who also recommends admission. Pt Hb >8.0.  No active symptoms, will hold off on blood transfusion.  Plan to admit.

## 2017-06-16 NOTE — ED PROVIDER NOTE - MEDICAL DECISION MAKING DETAILS
82yr old male with black, tarry stools, HB dropping.  No acute symptoms now.  FOBT + rectal exam.  Protonix (presumed upper source), Type and screen, may need transfusion, repeat H/H, labs, admit.

## 2017-06-16 NOTE — ED PROVIDER NOTE - MUSCULOSKELETAL, MLM
Spine appears normal, range of motion is not limited, no muscle or joint tenderness. Chronic wounds right groin, lower extremity.

## 2017-06-16 NOTE — ED ADULT NURSE NOTE - OBJECTIVE STATEMENT
pt presents to ED with abnormal labs. pt denies ant CP, SOB, fever, chills, or NV pt presents to ED with abnormal labs. pt denies ant CP, SOB, fever, chills, or NV. pt has an open wound in R groin packed with gauze. as per family pt has an IVF in RLE extremity. RLE edema

## 2017-06-16 NOTE — ED PROVIDER NOTE - CARE PLAN
Principal Discharge DX:	GI bleeding  Secondary Diagnosis:	Anemia  Secondary Diagnosis:	ARYA (acute kidney injury)

## 2017-06-16 NOTE — ED PROVIDER NOTE - OBJECTIVE STATEMENT
83 yo M h/o cad w/ stents, on aspirin, afib not anticoagulated, copd on home O2, htn, chf, anemia, ckd, recent admissions 3/21-4/4 and 5/4-5/25 for anemia and GI bleed   presents from [facility] for anemia (7.7 on labs today decreasing from 9.5 over past few weeks). Pt c/o weakness. Denies blood per rectum or bleeding elsewhere. 81 yo M h/o cad w/ stents, on aspirin, afib not anticoagulated, copd on home O2, htn, chf, anemia, ckd, recent admissions 3/21-4/4 and 5/4-5/25 for anemia and GI bleed presents from [facility] for anemia (7.7 on labs today decreasing from 9.5 over past few weeks). Pt c/o weakness. Denies blood per rectum or bleeding elsewhere.

## 2017-06-17 DIAGNOSIS — I25.10 ATHEROSCLEROTIC HEART DISEASE OF NATIVE CORONARY ARTERY WITHOUT ANGINA PECTORIS: ICD-10-CM

## 2017-06-17 DIAGNOSIS — I50.30 UNSPECIFIED DIASTOLIC (CONGESTIVE) HEART FAILURE: ICD-10-CM

## 2017-06-17 DIAGNOSIS — J44.9 CHRONIC OBSTRUCTIVE PULMONARY DISEASE, UNSPECIFIED: ICD-10-CM

## 2017-06-17 DIAGNOSIS — L89.159 PRESSURE ULCER OF SACRAL REGION, UNSPECIFIED STAGE: ICD-10-CM

## 2017-06-17 DIAGNOSIS — N18.9 CHRONIC KIDNEY DISEASE, UNSPECIFIED: ICD-10-CM

## 2017-06-17 DIAGNOSIS — M10.9 GOUT, UNSPECIFIED: ICD-10-CM

## 2017-06-17 DIAGNOSIS — G47.33 OBSTRUCTIVE SLEEP APNEA (ADULT) (PEDIATRIC): ICD-10-CM

## 2017-06-17 DIAGNOSIS — D64.9 ANEMIA, UNSPECIFIED: ICD-10-CM

## 2017-06-17 DIAGNOSIS — L97.909 NON-PRESSURE CHRONIC ULCER OF UNSPECIFIED PART OF UNSPECIFIED LOWER LEG WITH UNSPECIFIED SEVERITY: ICD-10-CM

## 2017-06-17 DIAGNOSIS — I10 ESSENTIAL (PRIMARY) HYPERTENSION: ICD-10-CM

## 2017-06-17 DIAGNOSIS — K92.2 GASTROINTESTINAL HEMORRHAGE, UNSPECIFIED: ICD-10-CM

## 2017-06-17 DIAGNOSIS — Z01.810 ENCOUNTER FOR PREPROCEDURAL CARDIOVASCULAR EXAMINATION: ICD-10-CM

## 2017-06-17 LAB
ALBUMIN SERPL ELPH-MCNC: 1.7 G/DL — LOW (ref 3.3–5)
ALP SERPL-CCNC: 57 U/L — SIGNIFICANT CHANGE UP (ref 40–120)
ALT FLD-CCNC: 12 U/L — SIGNIFICANT CHANGE UP (ref 12–78)
ANION GAP SERPL CALC-SCNC: 6 MMOL/L — SIGNIFICANT CHANGE UP (ref 5–17)
APPEARANCE UR: CLEAR — SIGNIFICANT CHANGE UP
AST SERPL-CCNC: 20 U/L — SIGNIFICANT CHANGE UP (ref 15–37)
BASOPHILS # BLD AUTO: 0.1 K/UL — SIGNIFICANT CHANGE UP (ref 0–0.2)
BASOPHILS NFR BLD AUTO: 1 % — SIGNIFICANT CHANGE UP (ref 0–2)
BILIRUB SERPL-MCNC: 0.3 MG/DL — SIGNIFICANT CHANGE UP (ref 0.2–1.2)
BILIRUB UR-MCNC: NEGATIVE — SIGNIFICANT CHANGE UP
BUN SERPL-MCNC: 53 MG/DL — HIGH (ref 7–23)
CALCIUM SERPL-MCNC: 8.2 MG/DL — LOW (ref 8.5–10.1)
CHLORIDE SERPL-SCNC: 114 MMOL/L — HIGH (ref 96–108)
CO2 SERPL-SCNC: 28 MMOL/L — SIGNIFICANT CHANGE UP (ref 22–31)
COLOR SPEC: YELLOW — SIGNIFICANT CHANGE UP
CREAT SERPL-MCNC: 1.65 MG/DL — HIGH (ref 0.5–1.3)
DIFF PNL FLD: NEGATIVE — SIGNIFICANT CHANGE UP
EOSINOPHIL # BLD AUTO: 0.4 K/UL — SIGNIFICANT CHANGE UP (ref 0–0.5)
EOSINOPHIL NFR BLD AUTO: 4.3 % — SIGNIFICANT CHANGE UP (ref 0–6)
GLUCOSE SERPL-MCNC: 129 MG/DL — HIGH (ref 70–99)
GLUCOSE UR QL: NEGATIVE MG/DL — SIGNIFICANT CHANGE UP
HCT VFR BLD CALC: 25 % — LOW (ref 39–50)
HGB BLD-MCNC: 8.2 G/DL — LOW (ref 13–17)
KETONES UR-MCNC: NEGATIVE — SIGNIFICANT CHANGE UP
LACTATE SERPL-SCNC: 1.1 MMOL/L — SIGNIFICANT CHANGE UP (ref 0.7–2)
LEUKOCYTE ESTERASE UR-ACNC: (no result)
LYMPHOCYTES # BLD AUTO: 0.5 K/UL — LOW (ref 1–3.3)
LYMPHOCYTES # BLD AUTO: 5.7 % — LOW (ref 13–44)
MAGNESIUM SERPL-MCNC: 2.1 MG/DL — SIGNIFICANT CHANGE UP (ref 1.6–2.6)
MCHC RBC-ENTMCNC: 28.9 PG — SIGNIFICANT CHANGE UP (ref 27–34)
MCHC RBC-ENTMCNC: 32.7 GM/DL — SIGNIFICANT CHANGE UP (ref 32–36)
MCV RBC AUTO: 88.3 FL — SIGNIFICANT CHANGE UP (ref 80–100)
MONOCYTES # BLD AUTO: 0.7 K/UL — SIGNIFICANT CHANGE UP (ref 0–0.9)
MONOCYTES NFR BLD AUTO: 7.8 % — SIGNIFICANT CHANGE UP (ref 2–14)
NEUTROPHILS # BLD AUTO: 6.9 K/UL — SIGNIFICANT CHANGE UP (ref 1.8–7.4)
NEUTROPHILS NFR BLD AUTO: 81.2 % — HIGH (ref 43–77)
NITRITE UR-MCNC: NEGATIVE — SIGNIFICANT CHANGE UP
PH UR: 5 — SIGNIFICANT CHANGE UP (ref 5–8)
PHOSPHATE SERPL-MCNC: 1.9 MG/DL — LOW (ref 2.5–4.5)
PLATELET # BLD AUTO: 312 K/UL — SIGNIFICANT CHANGE UP (ref 150–400)
POTASSIUM SERPL-MCNC: 3.8 MMOL/L — SIGNIFICANT CHANGE UP (ref 3.5–5.3)
POTASSIUM SERPL-SCNC: 3.8 MMOL/L — SIGNIFICANT CHANGE UP (ref 3.5–5.3)
PROT SERPL-MCNC: 5.2 GM/DL — LOW (ref 6–8.3)
PROT UR-MCNC: 15 MG/DL
RBC # BLD: 2.83 M/UL — LOW (ref 4.2–5.8)
RBC # FLD: 16.1 % — HIGH (ref 10.3–14.5)
SODIUM SERPL-SCNC: 148 MMOL/L — HIGH (ref 135–145)
SP GR SPEC: 1.01 — SIGNIFICANT CHANGE UP (ref 1.01–1.02)
UROBILINOGEN FLD QL: NEGATIVE MG/DL — SIGNIFICANT CHANGE UP
WBC # BLD: 8.5 K/UL — SIGNIFICANT CHANGE UP (ref 3.8–10.5)
WBC # FLD AUTO: 8.5 K/UL — SIGNIFICANT CHANGE UP (ref 3.8–10.5)
WBC UR QL: SIGNIFICANT CHANGE UP

## 2017-06-17 RX ORDER — HYDRALAZINE HCL 50 MG
150 TABLET ORAL EVERY 12 HOURS
Qty: 0 | Refills: 0 | Status: DISCONTINUED | OUTPATIENT
Start: 2017-06-17 | End: 2017-06-17

## 2017-06-17 RX ORDER — OXYCODONE HYDROCHLORIDE 5 MG/1
5 TABLET ORAL DAILY
Qty: 0 | Refills: 0 | Status: DISCONTINUED | OUTPATIENT
Start: 2017-06-17 | End: 2017-06-17

## 2017-06-17 RX ORDER — VANCOMYCIN HCL 1 G
VIAL (EA) INTRAVENOUS
Qty: 0 | Refills: 0 | Status: DISCONTINUED | OUTPATIENT
Start: 2017-06-17 | End: 2017-06-17

## 2017-06-17 RX ORDER — NITROGLYCERIN 6.5 MG
0.4 CAPSULE, EXTENDED RELEASE ORAL THREE TIMES A DAY
Qty: 0 | Refills: 0 | Status: DISCONTINUED | OUTPATIENT
Start: 2017-06-17 | End: 2017-06-20

## 2017-06-17 RX ORDER — SODIUM CHLORIDE 9 MG/ML
1000 INJECTION, SOLUTION INTRAVENOUS
Qty: 0 | Refills: 0 | Status: DISCONTINUED | OUTPATIENT
Start: 2017-06-17 | End: 2017-06-17

## 2017-06-17 RX ORDER — GABAPENTIN 400 MG/1
300 CAPSULE ORAL AT BEDTIME
Qty: 0 | Refills: 0 | Status: DISCONTINUED | OUTPATIENT
Start: 2017-06-17 | End: 2017-06-20

## 2017-06-17 RX ORDER — PIPERACILLIN AND TAZOBACTAM 4; .5 G/20ML; G/20ML
3.38 INJECTION, POWDER, LYOPHILIZED, FOR SOLUTION INTRAVENOUS ONCE
Qty: 0 | Refills: 0 | Status: COMPLETED | OUTPATIENT
Start: 2017-06-17 | End: 2017-06-17

## 2017-06-17 RX ORDER — HYDRALAZINE HCL 50 MG
150 TABLET ORAL
Qty: 0 | Refills: 0 | Status: DISCONTINUED | OUTPATIENT
Start: 2017-06-17 | End: 2017-06-17

## 2017-06-17 RX ORDER — VANCOMYCIN HCL 1 G
1000 VIAL (EA) INTRAVENOUS ONCE
Qty: 0 | Refills: 0 | Status: COMPLETED | OUTPATIENT
Start: 2017-06-17 | End: 2017-06-17

## 2017-06-17 RX ORDER — METOPROLOL TARTRATE 50 MG
12.5 TABLET ORAL
Qty: 0 | Refills: 0 | Status: DISCONTINUED | OUTPATIENT
Start: 2017-06-17 | End: 2017-06-20

## 2017-06-17 RX ORDER — GABAPENTIN 400 MG/1
600 CAPSULE ORAL AT BEDTIME
Qty: 0 | Refills: 0 | Status: DISCONTINUED | OUTPATIENT
Start: 2017-06-17 | End: 2017-06-17

## 2017-06-17 RX ORDER — DOXAZOSIN MESYLATE 4 MG
8 TABLET ORAL AT BEDTIME
Qty: 0 | Refills: 0 | Status: DISCONTINUED | OUTPATIENT
Start: 2017-06-17 | End: 2017-06-20

## 2017-06-17 RX ORDER — PIPERACILLIN AND TAZOBACTAM 4; .5 G/20ML; G/20ML
3.38 INJECTION, POWDER, LYOPHILIZED, FOR SOLUTION INTRAVENOUS EVERY 8 HOURS
Qty: 0 | Refills: 0 | Status: DISCONTINUED | OUTPATIENT
Start: 2017-06-17 | End: 2017-06-20

## 2017-06-17 RX ORDER — VANCOMYCIN HCL 1 G
1000 VIAL (EA) INTRAVENOUS EVERY 12 HOURS
Qty: 0 | Refills: 0 | Status: DISCONTINUED | OUTPATIENT
Start: 2017-06-17 | End: 2017-06-17

## 2017-06-17 RX ORDER — FENOFIBRATE,MICRONIZED 130 MG
145 CAPSULE ORAL DAILY
Qty: 0 | Refills: 0 | Status: DISCONTINUED | OUTPATIENT
Start: 2017-06-17 | End: 2017-06-20

## 2017-06-17 RX ORDER — ALBUTEROL 90 UG/1
2 AEROSOL, METERED ORAL EVERY 6 HOURS
Qty: 0 | Refills: 0 | Status: DISCONTINUED | OUTPATIENT
Start: 2017-06-17 | End: 2017-06-17

## 2017-06-17 RX ORDER — SODIUM,POTASSIUM PHOSPHATES 278-250MG
1 POWDER IN PACKET (EA) ORAL
Qty: 0 | Refills: 0 | Status: COMPLETED | OUTPATIENT
Start: 2017-06-17 | End: 2017-06-18

## 2017-06-17 RX ORDER — PANTOPRAZOLE SODIUM 20 MG/1
40 TABLET, DELAYED RELEASE ORAL DAILY
Qty: 0 | Refills: 0 | Status: DISCONTINUED | OUTPATIENT
Start: 2017-06-17 | End: 2017-06-17

## 2017-06-17 RX ORDER — ONDANSETRON 8 MG/1
4 TABLET, FILM COATED ORAL EVERY 6 HOURS
Qty: 0 | Refills: 0 | Status: DISCONTINUED | OUTPATIENT
Start: 2017-06-17 | End: 2017-06-20

## 2017-06-17 RX ORDER — HYDRALAZINE HCL 50 MG
150 TABLET ORAL EVERY 12 HOURS
Qty: 0 | Refills: 0 | Status: DISCONTINUED | OUTPATIENT
Start: 2017-06-17 | End: 2017-06-20

## 2017-06-17 RX ORDER — PANTOPRAZOLE SODIUM 20 MG/1
40 TABLET, DELAYED RELEASE ORAL EVERY 12 HOURS
Qty: 0 | Refills: 0 | Status: DISCONTINUED | OUTPATIENT
Start: 2017-06-17 | End: 2017-06-20

## 2017-06-17 RX ORDER — ALLOPURINOL 300 MG
100 TABLET ORAL DAILY
Qty: 0 | Refills: 0 | Status: DISCONTINUED | OUTPATIENT
Start: 2017-06-17 | End: 2017-06-20

## 2017-06-17 RX ORDER — CHOLECALCIFEROL (VITAMIN D3) 125 MCG
1000 CAPSULE ORAL DAILY
Qty: 0 | Refills: 0 | Status: DISCONTINUED | OUTPATIENT
Start: 2017-06-17 | End: 2017-06-17

## 2017-06-17 RX ORDER — SODIUM CHLORIDE 9 MG/ML
1000 INJECTION INTRAMUSCULAR; INTRAVENOUS; SUBCUTANEOUS
Qty: 0 | Refills: 0 | Status: DISCONTINUED | OUTPATIENT
Start: 2017-06-17 | End: 2017-06-17

## 2017-06-17 RX ORDER — HEPARIN SODIUM 5000 [USP'U]/ML
5000 INJECTION INTRAVENOUS; SUBCUTANEOUS EVERY 8 HOURS
Qty: 0 | Refills: 0 | Status: DISCONTINUED | OUTPATIENT
Start: 2017-06-17 | End: 2017-06-20

## 2017-06-17 RX ORDER — ASCORBIC ACID 60 MG
1000 TABLET,CHEWABLE ORAL DAILY
Qty: 0 | Refills: 0 | Status: DISCONTINUED | OUTPATIENT
Start: 2017-06-17 | End: 2017-06-17

## 2017-06-17 RX ORDER — PRAMIPEXOLE DIHYDROCHLORIDE 0.12 MG/1
0.12 TABLET ORAL THREE TIMES A DAY
Qty: 0 | Refills: 0 | Status: DISCONTINUED | OUTPATIENT
Start: 2017-06-17 | End: 2017-06-20

## 2017-06-17 RX ORDER — FUROSEMIDE 40 MG
40 TABLET ORAL DAILY
Qty: 0 | Refills: 0 | Status: DISCONTINUED | OUTPATIENT
Start: 2017-06-17 | End: 2017-06-18

## 2017-06-17 RX ORDER — ACETAMINOPHEN 500 MG
650 TABLET ORAL ONCE
Qty: 0 | Refills: 0 | Status: COMPLETED | OUTPATIENT
Start: 2017-06-17 | End: 2017-06-17

## 2017-06-17 RX ORDER — VANCOMYCIN HCL 1 G
500 VIAL (EA) INTRAVENOUS EVERY 12 HOURS
Qty: 0 | Refills: 0 | Status: DISCONTINUED | OUTPATIENT
Start: 2017-06-17 | End: 2017-06-20

## 2017-06-17 RX ORDER — SENNA PLUS 8.6 MG/1
2 TABLET ORAL AT BEDTIME
Qty: 0 | Refills: 0 | Status: DISCONTINUED | OUTPATIENT
Start: 2017-06-17 | End: 2017-06-20

## 2017-06-17 RX ORDER — ISOSORBIDE MONONITRATE 60 MG/1
120 TABLET, EXTENDED RELEASE ORAL DAILY
Qty: 0 | Refills: 0 | Status: DISCONTINUED | OUTPATIENT
Start: 2017-06-17 | End: 2017-06-20

## 2017-06-17 RX ORDER — METOPROLOL TARTRATE 50 MG
12.5 TABLET ORAL
Qty: 0 | Refills: 0 | Status: DISCONTINUED | OUTPATIENT
Start: 2017-06-17 | End: 2017-06-17

## 2017-06-17 RX ORDER — ASPIRIN/CALCIUM CARB/MAGNESIUM 324 MG
81 TABLET ORAL DAILY
Qty: 0 | Refills: 0 | Status: DISCONTINUED | OUTPATIENT
Start: 2017-06-17 | End: 2017-06-17

## 2017-06-17 RX ORDER — ALBUTEROL 90 UG/1
2 AEROSOL, METERED ORAL EVERY 6 HOURS
Qty: 0 | Refills: 0 | Status: DISCONTINUED | OUTPATIENT
Start: 2017-06-17 | End: 2017-06-20

## 2017-06-17 RX ORDER — SIMVASTATIN 20 MG/1
10 TABLET, FILM COATED ORAL AT BEDTIME
Qty: 0 | Refills: 0 | Status: DISCONTINUED | OUTPATIENT
Start: 2017-06-17 | End: 2017-06-20

## 2017-06-17 RX ORDER — HYDRALAZINE HCL 50 MG
150 TABLET ORAL
Qty: 0 | Refills: 0 | COMMUNITY

## 2017-06-17 RX ORDER — BUDESONIDE, MICRONIZED 100 %
0.5 POWDER (GRAM) MISCELLANEOUS
Qty: 0 | Refills: 0 | Status: DISCONTINUED | OUTPATIENT
Start: 2017-06-17 | End: 2017-06-20

## 2017-06-17 RX ADMIN — Medication 150 MILLIGRAM(S): at 17:55

## 2017-06-17 RX ADMIN — Medication 1 TABLET(S): at 13:16

## 2017-06-17 RX ADMIN — Medication 150 MILLIGRAM(S): at 06:54

## 2017-06-17 RX ADMIN — Medication 650 MILLIGRAM(S): at 04:34

## 2017-06-17 RX ADMIN — GABAPENTIN 300 MILLIGRAM(S): 400 CAPSULE ORAL at 21:24

## 2017-06-17 RX ADMIN — PANTOPRAZOLE SODIUM 40 MILLIGRAM(S): 20 TABLET, DELAYED RELEASE ORAL at 06:48

## 2017-06-17 RX ADMIN — Medication 12.5 MILLIGRAM(S): at 08:06

## 2017-06-17 RX ADMIN — Medication 8 MILLIGRAM(S): at 22:08

## 2017-06-17 RX ADMIN — Medication 12.5 MILLIGRAM(S): at 17:52

## 2017-06-17 RX ADMIN — PIPERACILLIN AND TAZOBACTAM 25 GRAM(S): 4; .5 INJECTION, POWDER, LYOPHILIZED, FOR SOLUTION INTRAVENOUS at 15:39

## 2017-06-17 RX ADMIN — PIPERACILLIN AND TAZOBACTAM 25 GRAM(S): 4; .5 INJECTION, POWDER, LYOPHILIZED, FOR SOLUTION INTRAVENOUS at 21:29

## 2017-06-17 RX ADMIN — Medication 1 TABLET(S): at 21:29

## 2017-06-17 RX ADMIN — Medication 81 MILLIGRAM(S): at 13:15

## 2017-06-17 RX ADMIN — PANTOPRAZOLE SODIUM 40 MILLIGRAM(S): 20 TABLET, DELAYED RELEASE ORAL at 17:56

## 2017-06-17 RX ADMIN — Medication 100 MILLIGRAM(S): at 19:42

## 2017-06-17 RX ADMIN — ISOSORBIDE MONONITRATE 120 MILLIGRAM(S): 60 TABLET, EXTENDED RELEASE ORAL at 15:39

## 2017-06-17 RX ADMIN — Medication 145 MILLIGRAM(S): at 13:15

## 2017-06-17 RX ADMIN — PRAMIPEXOLE DIHYDROCHLORIDE 0.12 MILLIGRAM(S): 0.12 TABLET ORAL at 15:40

## 2017-06-17 RX ADMIN — PRAMIPEXOLE DIHYDROCHLORIDE 0.12 MILLIGRAM(S): 0.12 TABLET ORAL at 08:07

## 2017-06-17 RX ADMIN — SODIUM CHLORIDE 75 MILLILITER(S): 9 INJECTION, SOLUTION INTRAVENOUS at 06:44

## 2017-06-17 RX ADMIN — Medication 1 TABLET(S): at 17:52

## 2017-06-17 RX ADMIN — PIPERACILLIN AND TAZOBACTAM 25 GRAM(S): 4; .5 INJECTION, POWDER, LYOPHILIZED, FOR SOLUTION INTRAVENOUS at 08:12

## 2017-06-17 RX ADMIN — Medication 0.5 MILLIGRAM(S): at 20:04

## 2017-06-17 RX ADMIN — Medication 0.5 MILLIGRAM(S): at 08:25

## 2017-06-17 RX ADMIN — SIMVASTATIN 10 MILLIGRAM(S): 20 TABLET, FILM COATED ORAL at 21:26

## 2017-06-17 RX ADMIN — Medication 250 MILLIGRAM(S): at 07:06

## 2017-06-17 RX ADMIN — PRAMIPEXOLE DIHYDROCHLORIDE 0.12 MILLIGRAM(S): 0.12 TABLET ORAL at 21:24

## 2017-06-17 RX ADMIN — Medication 1 TABLET(S): at 10:27

## 2017-06-17 RX ADMIN — Medication 100 MILLIGRAM(S): at 13:15

## 2017-06-17 RX ADMIN — Medication 40 MILLIGRAM(S): at 06:53

## 2017-06-17 NOTE — CONSULT NOTE ADULT - SUBJECTIVE AND OBJECTIVE BOX
CARDIOLOGY AND ELECTROPHYSIOLOGY ASSESSMENT    HPI:  82 year old male with history of CAD s/p stents (LAD, RCA bare metal around ),PVD (RLE stent/ angioplasty and surgical debridment by Dr Siu  ) A.Fib not on anticoagulation due to GI bleeding, Hypertension, COPD on home O2 2L, SHAYY on nocturnal BIPAP, ex-smoker (smoked 1ppd X 50 years, quit 22 years ago),  Chronic diastolic CHF, Hyperlipidemia, PVD, Iron deficiency anemia, Chronic back pain, Gout, BPH, CKD III with baseline Cr 2. Was ecently admitted to  on   with anemia of GI bleeding, RLE and RUE DVTs,( received pRBCs and IVC filter discharged to rehab with aspirin) was readmitted to   ER on 17 for worsening anemia with Hb 6, worsening leg pain from ulcers and worsening renal function Cr 3.99 now presents from [facility] with anemia (7.7 on labs today decreasing from 9.5 over past few weeks). Pt c/o gradual onset  progressive fatigue over the last couple of weeks. Patient is unable to ambulate because of his leg cellulitis and ulcers. He denies any shortness of breath, palpitations / chest pain / light headedness. According to the daughter the attendants at [facility] did notice dark stools for the last couple of days. Patient denies any abdominal pain or change in bowel or urinary habits.  In ED, + guaiac. (2017 00:37)      17:  Pt. seen and examined.  Denies complaints at this time.  Planned for endoscopy.  H/o BMS by Dr. José in .  Pt. denies any CP, SOB, lightheadedness, dizziness, or syncope in recent past.     PAST MEDICAL & SURGICAL HISTORY:  Sepsis, due to unspecified organism: 2/2 poorly healing wounds b/l  BPH (benign prostatic hypertrophy)  Hyperlipemia  Coronary artery disease  Hypertension  Dyspepsia: On moderate exertion.  Sleep apnea, obstructive: Requires home 02 therapy, and treatment with BIPAP  Atelectasis  Pleural effusion, bilateral  Respiratory failure  Peripheral edema  CRI (chronic renal insufficiency)  Gout  CRF (chronic renal failure)  Benign prostatic hypertrophy  Spinal stenosis  Hypercholesterolemia  GERD (gastroesophageal reflux disease)  CAD (coronary artery disease)  Hypertension  S/P angioplasty with stent  Cataract of left eye  Prostate: Surgery green light procedure.  S/P rotator cuff surgery: Right  S/P angioplasty  Rotator cuff tear, right: repair      MEDICATIONS  (STANDING):  buDESOnide   0.5 milliGRAM(s) Respule 0.5milliGRAM(s) Inhalation two times a day  aspirin enteric coated 81milliGRAM(s) Oral daily  doxazosin 8milliGRAM(s) Oral at bedtime  isosorbide   mononitrate ER Tablet (IMDUR) 120milliGRAM(s) Oral daily  diltiazem    Tablet 120milliGRAM(s) Oral every 12 hours  allopurinol 100milliGRAM(s) Oral daily  fenofibrate Tablet 145milliGRAM(s) Oral daily  simvastatin 10milliGRAM(s) Oral at bedtime  pramipexole 0.125milliGRAM(s) Oral three times a day  furosemide    Tablet 40milliGRAM(s) Oral daily  multivitamin Oral Tab/Cap - Peds 1Tablet(s) Oral daily  gabapentin 300milliGRAM(s) Oral at bedtime  metoprolol 12.5milliGRAM(s) Oral two times a day  senna 2Tablet(s) Oral at bedtime  pantoprazole  Injectable 40milliGRAM(s) IV Push every 12 hours  piperacillin/tazobactam IVPB. 3.375Gram(s) IV Intermittent every 8 hours  hydrALAZINE 150milliGRAM(s) Oral every 12 hours  sodium chloride 0.45%. 1000milliLiter(s) IV Continuous <Continuous>  potassium acid phosphate/sodium acid phosphate tablet (K-PHOS No. 2) 1Tablet(s) Oral four times a day with meals  vancomycin  IVPB 500milliGRAM(s) IV Intermittent every 12 hours  bisacodyl Suppository 10milliGRAM(s) Rectal once    MEDICATIONS  (PRN):  nitroglycerin     SubLingual 0.4milliGRAM(s) SubLingual three times a day PRN Chest Pain  ondansetron Injectable 4milliGRAM(s) IV Push every 6 hours PRN Nausea  ALBUTerol    90 MICROgram(s) HFA Inhaler 2Puff(s) Inhalation every 6 hours PRN Shortness of Breath and/or Wheezing      Allergies    No Known Allergies    Intolerances        SOCIAL HISTORY: Denies tobacco, etoh abuse or illicit drug use    FAMILY HISTORY:  No pertinent family history in first degree relatives      Vital Signs Last 24 Hrs  T(C): 36.7, Max: 38.3 ( @ 04:03)  T(F): 98.1, Max: 100.9 ( @ 04:03)  HR: 69 (69 - 89)  BP: 144/42 (132/71 - 182/44)  BP(mean): --  RR: 18 (16 - 20)  SpO2: 97% (94% - 98%)    REVIEW OF SYSTEMS:    CONSTITUTIONAL:  As per HPI.  HEENT:  Eyes:  No diplopia or blurred vision. ENT:  No earache, sore throat or runny nose.  CARDIOVASCULAR:  No pressure, squeezing, strangling, tightness, heaviness or aching about the chest, neck, axilla or epigastrium.  RESPIRATORY:  No cough, shortness of breath, PND or orthopnea.  GASTROINTESTINAL:  No nausea, vomiting or diarrhea.  GENITOURINARY:  No dysuria, frequency or urgency.  MUSCULOSKELETAL:  As per HPI.  SKIN:  No change in skin, hair or nails.  NEUROLOGIC:  No paresthesias, fasciculations, seizures or weakness.  PSYCHIATRIC:  No disorder of thought or mood.  ENDOCRINE:  No heat or cold intolerance, polyuria or polydipsia.  HEMATOLOGICAL:  No easy bruising or bleedings:  .     PHYSICAL EXAMINATION:    GENERAL APPEARANCE:  Pt. is not currently dyspneic, in no distress. Pt. is alert, oriented, and pleasant.  HEENT:  Pupils are normal and react normally. No icterus. Mucous membranes well colored.  NECK:  Supple. No lymphadenopathy. Jugular venous pressure not elevated. Carotids equal.   HEART:   The cardiac impulse has a normal quality. There are no murmurs, rubs or gallops noted  CHEST:  Chest is clear to auscultation. Normal respiratory effort.  ABDOMEN:  Soft and nontender.   EXTREMITIES:  There is no edema.   SKIN:  No rash or significant lesions are noted.    I&O's Summary      LABS:                        8.2    8.5   )-----------( 312      ( 2017 03:54 )             25.0       148<H>  |  114<H>  |  53<H>  ----------------------------<  129<H>  3.8   |  28  |  1.65<H>    Ca    8.2<L>      2017 03:54  Phos  1.9     -  Mg     2.1     -17    TPro  5.2<L>  /  Alb  1.7<L>  /  TBili  0.3  /  DBili  x   /  AST  20  /  ALT  12  /  AlkPhos  57  -17  LIVER FUNCTIONS - ( 2017 03:54 )  Alb: 1.7 g/dL / Pro: 5.2 gm/dL / ALK PHOS: 57 U/L / ALT: 12 U/L / AST: 20 U/L / GGT: x           PT/INR - ( 2017 19:32 )   PT: 13.8 sec;   INR: 1.27 ratio         PTT - ( 2017 19:32 )  PTT:35.2 sec    Urinalysis Basic - ( 2017 10:36 )    Color: Yellow / Appearance: Clear / S.010 / pH: x  Gluc: x / Ketone: Negative  / Bili: Negative / Urobili: Negative mg/dL   Blood: x / Protein: 15 mg/dL / Nitrite: Negative   Leuk Esterase: Trace / RBC: x / WBC 0-2   Sq Epi: x / Non Sq Epi: x / Bacteria: x          EKG:    TELEMETRY:    CARDIAC TESTS:    RADIOLOGY & ADDITIONAL STUDIES:

## 2017-06-17 NOTE — CONSULT NOTE ADULT - SUBJECTIVE AND OBJECTIVE BOX
Patient is a 82y old  Male who presents with a chief complaint of Sent from [facility] rehab because of low hemoglobin 7.7 (17 Jun 2017 00:37)  HPI:  82 year old male with history of CAD s/p stents (LAD, RCA bare metal around 9/16),PVD (RLE stent/ angioplasty and surgical debridment by Dr Siu 5/20 ) A.Fib not on anticoagulation due to GI bleeding, Hypertension, COPD on home O2 2L, SHAYY on nocturnal BIPAP, ex-smoker (smoked 1ppd X 50 years, quit 22 years ago),  Chronic diastolic CHF, Hyperlipidemia, PVD, Iron deficiency anemia, Chronic back pain, Gout, BPH, CKD III with baseline Cr 2. Was ecently admitted to  on  4/17 with anemia of GI bleeding, RLE and RUE DVTs,( received pRBCs and IVC filter discharged to rehab with aspirin) was readmitted to   ER on 5/4/17 for worsening anemia with Hb 6, worsening leg pain from ulcers and worsening renal function Cr 3.99 now presents from [facility] with anemia (7.7 on labs today decreasing from 9.5 over past few weeks). Pt c/o gradual onset  progressive fatigue over the last couple of weeks. Patient is unable to ambulate because of his leg cellulitis and ulcers. He denies any shortness of breath, palpitations / chest pain / light headedness. According to the daughter the attendants at [facility] did notice dark stools for the last couple of days. Patient denies any abdominal pain or change in bowel or urinary habits.  In ED, + guaiac. (17 Jun 2017 00:37), Pt has chronic B/L LE, lumphaedma, cllulitis, S/O Rt femoral endarterectomy with open  post op wound.  PAST MEDICAL & SURGICAL HISTORY:  Sepsis, due to unspecified organism: 2/2 poorly healing wounds b/l  BPH (benign prostatic hypertrophy)  Hyperlipemia  Coronary artery disease  Hypertension  Dyspepsia: On moderate exertion.  Sleep apnea, obstructive: Requires home 02 therapy, and treatment with BIPAP  Atelectasis  Pleural effusion, bilateral  Respiratory failure  Peripheral edema  CRI (chronic renal insufficiency)  Gout  CRF (chronic renal failure)  Benign prostatic hypertrophy  Spinal stenosis  Hypercholesterolemia  GERD (gastroesophageal reflux disease)  CAD (coronary artery disease)  Hypertension  S/P angioplasty with stent  Cataract of left eye  Prostate: Surgery green light procedure.  S/P rotator cuff surgery: Right  S/P angioplasty, Rt femoral endarterectomy  Rotator cuff tear, right: repair  FH: NC  Social HX: non smoker, no ETOH abuse.  Allergies    No Known Allergies    Vital Signs Last 24 Hrs  T(C): 36.7, Max: 38.3 (06-17 @ 04:03)  T(F): 98.1, Max: 100.9 (06-17 @ 04:03)  HR: 69 (69 - 89)  BP: 144/42 (132/71 - 182/44)  BP(mean): --  RR: 18 (16 - 20)  SpO2: 97% (94% - 98%)    PHYSICAL EXAM:      Constitutional: NAD    General:  AOx3    Respiratory:  CTABL    Cardiovascular: S1+S2+0    Gastrointestinal : Abdomen soft, non distended, NT    Extremities: Right groin open wound with no cellulitis, B/L LE edema cellulitis    Neurological: No focal deficit.  Labs:                          8.2    8.5   )-----------( 312      ( 17 Jun 2017 03:54 )             25.0       06-17    148<H>  |  114<H>  |  53<H>  ----------------------------<  129<H>  3.8   |  28  |  1.65<H>    Ca    8.2<L>      17 Jun 2017 03:54  Phos  1.9     06-17  Mg     2.1     06-17    TPro  5.2<L>  /  Alb  1.7<L>  /  TBili  0.3  /  DBili  x   /  AST  20  /  ALT  12  /  AlkPhos  57  06-17      PT/INR - ( 16 Jun 2017 19:32 )   PT: 13.8 sec;   INR: 1.27 ratio         PTT - ( 16 Jun 2017 19:32 )  PTT:35.2 sec

## 2017-06-17 NOTE — H&P ADULT - ASSESSMENT
82 y M with recurrent anemia likely secondary to GIB presents from [facility] with finding of low hemoglobin

## 2017-06-17 NOTE — CONSULT NOTE ADULT - SUBJECTIVE AND OBJECTIVE BOX
full consult to follow    Patient status post R femoral endarterectomy and bovine pericardial patch performed on last admission for ischemic R foot and leg    The incision has  and has been treated with local care.  There has been no bleeding noted.  The right foot and leg have been receiving local care.  The right heel has a full thickness eschar.    My concerns are that the patch may be infected or at a minimum exposed and should be covered with muscle flap.  If there is an underlying osteo of R calcaneus, limb salvage unlikely.

## 2017-06-17 NOTE — CONSULT NOTE ADULT - ASSESSMENT
82 y old male with multiple medical problems, vasculopath S/P Rt femoral endarterectomy wit open infected post op wound, B/L LE cellulits  IV antibiotics  ID antibiotics  Vascular surgery following.  No surgical intervention, care per vascular surgery.

## 2017-06-17 NOTE — H&P ADULT - PROBLEM SELECTOR PLAN 3
hold ASA 2/2 GIB  EKG : does not show any acute changes  continue fenofibrate  continue simvastatin   continue metoprolol tartrate  continue cardizem 120mg   continue imdur

## 2017-06-17 NOTE — CONSULT NOTE ADULT - PROBLEM SELECTOR RECOMMENDATION 4
- pt. is an intermediate risk patient planned for a low risk procedure and should undergo procedure without delay

## 2017-06-17 NOTE — CONSULT NOTE ADULT - SUBJECTIVE AND OBJECTIVE BOX
HPI:  83 yo man with extensive medical history was admitted with anemia. Outpt labs 7.7 at [facility], decreased from 9.5 a few weeks ago. +progressive weakness and fatigue. +recent dark stools. Patient had admission in March for similar issues. EGD was notable for esophagitis, gastric ulcers and duodenal dieulafoy lesion. Colonoscopy demonstrated diverticulosis, transverse colon subepithelial lesion and internal hemorrhoids. Patient was transfused with appropriate response. Hb now stable.       PAST MEDICAL & SURGICAL HISTORY:  Sepsis, due to unspecified organism: 2/2 poorly healing wounds b/l  BPH (benign prostatic hypertrophy)  Hyperlipemia  Coronary artery disease  Hypertension  Dyspepsia: On moderate exertion.  Sleep apnea, obstructive: Requires home 02 therapy, and treatment with BIPAP  Atelectasis  Pleural effusion, bilateral  Respiratory failure  Peripheral edema  CRI (chronic renal insufficiency)  Gout  CRF (chronic renal failure)  Benign prostatic hypertrophy  Spinal stenosis  Hypercholesterolemia  GERD (gastroesophageal reflux disease)  CAD (coronary artery disease)  Hypertension  S/P angioplasty with stent  Cataract of left eye  Prostate: Surgery green light procedure.  S/P rotator cuff surgery: Right  S/P angioplasty  Rotator cuff tear, right: repair      MEDICATIONS  (STANDING):  buDESOnide   0.5 milliGRAM(s) Respule 0.5milliGRAM(s) Inhalation two times a day  aspirin enteric coated 81milliGRAM(s) Oral daily  oxyCODONE IR 5milliGRAM(s) Oral daily  doxazosin 8milliGRAM(s) Oral at bedtime  isosorbide   mononitrate ER Tablet (IMDUR) 120milliGRAM(s) Oral daily  diltiazem    Tablet 120milliGRAM(s) Oral every 12 hours  allopurinol 100milliGRAM(s) Oral daily  fenofibrate Tablet 145milliGRAM(s) Oral daily  simvastatin 10milliGRAM(s) Oral at bedtime  pramipexole 0.125milliGRAM(s) Oral three times a day  furosemide    Tablet 40milliGRAM(s) Oral daily  multivitamin Oral Tab/Cap - Peds 1Tablet(s) Oral daily  gabapentin 300milliGRAM(s) Oral at bedtime  metoprolol 12.5milliGRAM(s) Oral two times a day  senna 2Tablet(s) Oral at bedtime  pantoprazole  Injectable 40milliGRAM(s) IV Push every 12 hours  piperacillin/tazobactam IVPB. 3.375Gram(s) IV Intermittent every 8 hours  hydrALAZINE 150milliGRAM(s) Oral every 12 hours  sodium chloride 0.45%. 1000milliLiter(s) IV Continuous <Continuous>  potassium acid phosphate/sodium acid phosphate tablet (K-PHOS No. 2) 1Tablet(s) Oral four times a day with meals  vancomycin  IVPB 500milliGRAM(s) IV Intermittent every 12 hours    MEDICATIONS  (PRN):  nitroglycerin     SubLingual 0.4milliGRAM(s) SubLingual three times a day PRN Chest Pain  ondansetron Injectable 4milliGRAM(s) IV Push every 6 hours PRN Nausea  ALBUTerol    90 MICROgram(s) HFA Inhaler 2Puff(s) Inhalation every 6 hours PRN Shortness of Breath and/or Wheezing      Allergies    No Known Allergies    Intolerances        SOCIAL HISTORY:  No smoking, social alcohol use, no recreational drug use    FAMILY HISTORY:  No pertinent family history in first degree relatives      REVIEW OF SYSTEMS    General: low grade temp    HEENT: no icterus    Respiratory and Thorax: no SOB  	  Cardiovascular: no CP    Gastrointestinal: as above    : no dysuria    Musculoskeletal: no myalgias    Skin: no jaundice    Neuro: no headaches or dizziness    Vital Signs Last 24 Hrs  T(C): 36.7, Max: 38.3 (06-17 @ 04:03)  T(F): 98.1, Max: 100.9 (06-17 @ 04:03)  HR: 69 (69 - 89)  BP: 144/42 (132/71 - 182/44)  BP(mean): --  RR: 18 (16 - 20)  SpO2: 97% (94% - 98%)    PHYSICAL EXAM:    Constitutional: NAD    Head: NCAT    HEENT: anicteric    Neck: no abnormal lymphadenopathy    Cardiovascular:  RRR    Gastrointestinal: soft ND +BS NTTP    Extremities: no LE edema    Neuro: no focal deficits    Skin: no jaundice      LABS:                        8.2    8.5   )-----------( 312      ( 17 Jun 2017 03:54 )             25.0     06-17    148<H>  |  114<H>  |  53<H>  ----------------------------<  129<H>  3.8   |  28  |  1.65<H>    Ca    8.2<L>      17 Jun 2017 03:54  Phos  1.9     06-17  Mg     2.1     06-17    TPro  5.2<L>  /  Alb  1.7<L>  /  TBili  0.3  /  DBili  x   /  AST  20  /  ALT  12  /  AlkPhos  57  06-17    PT/INR - ( 16 Jun 2017 19:32 )   PT: 13.8 sec;   INR: 1.27 ratio         PTT - ( 16 Jun 2017 19:32 )  PTT:35.2 sec  LIVER FUNCTIONS - ( 17 Jun 2017 03:54 )  Alb: 1.7 g/dL / Pro: 5.2 gm/dL / ALK PHOS: 57 U/L / ALT: 12 U/L / AST: 20 U/L / GGT: x             RADIOLOGY & ADDITIONAL STUDIES:

## 2017-06-17 NOTE — H&P ADULT - ATTENDING COMMENTS
83 y/o M CAD s/p stents, PVD, AFib not on A/C due to GI bleeding, HTN, COPD on home O2 2L, SHAYY on nocturnal BIPAP, Chronic diastolic CHF, HLD, Chronic back pain, Gout, BPH, CKD3, was referred to the ED from [facility] for further evaluation of anemia noted on labs was 7.7 on labs today. Pt c/o fatigue only, denies any associated symptoms. Pt also had reportedly "dark stools" of the last few days.    1)Anemia likely 2/2 GIB  ~admit to Medicine  ~serical CBCs  ~transfuse prn  ~f/u w/ GI consultation in the am  ~cont. PPI    2)CAD  ~will hold ASA for now  ~cont. home meds for now    3)ARYA on AKD  ~trial of IVF  ~f/u repeat labs  ~avoid nephrotoxic medications    4)B/L Leg ulcers  ~f/u w/ Vascular surgery    5)Left Groin wound; likely infected  ~f/u w/ wound care in the am  ~f/u PAN C+S  ~cont. wound care (W-to-D dressings)    6)SHAYY  ~cont. BiPAP qhs    7)COPD  ~cont. home meds

## 2017-06-17 NOTE — H&P ADULT - PROBLEM SELECTOR PLAN 5
Daily dressings   monitor for signs of infection  being followed up by Dr Siu right leg ulcer 1) lower leg   2)  right groin ( foul smelling and purulent)   - temp 100.8  - send pus for culture  - trend serum lactate  - vancomycin stat followed by ID consult  - start zosyn  - being followed up by Dr Siu  - wound care consult -Ananya Servin  - ID consult  - surgery consult

## 2017-06-17 NOTE — CONSULT NOTE ADULT - ASSESSMENT
81 yo man admitted with anemia and dark stools, improved s/p transfusion    -Clear liquids  -Monitor CBC closely   -Hold aspirin if okay with cardio, vascular  -PPI gtt  -Transfuse PRN  -Possible endoscopy Monday

## 2017-06-17 NOTE — PROGRESS NOTE ADULT - PROBLEM SELECTOR PLAN 5
right leg ulcer 1) lower leg   2)  right groin ( foul smelling and purulent)   -Possible cuase for fatigue   local wound culture from groin sent   - Normal Lactate   Cont with Zosyn 3.375 gm Q8H + Vancomycin 500 gm Q12H Day # 1 of Abx   - wound care consult   - ID consult appreciated   - surgery consult appreciated no acute surgical intervention  Vascular consult noted.

## 2017-06-17 NOTE — CONSULT NOTE ADULT - ASSESSMENT
81 y/o male with h/o CAD s/p stents (LAD, RCA bare metal around 9/16), PVD (RLE stent/ angioplasty) complicated with poorly healing right inguinal wound s/p prior surgical debridment (by Dr Siu 5/20 ) Brianna not on anticoagulation due to GI bleeding, Hypertension, COPD on home O2 2L, SHAYY on nocturnal BIPAP, Chronic diastolic CHF, Hyperlipidemia, PVD, Iron deficiency anemia, Chronic back pain, Gout, BPH, CKD III with baseline Cr 2, recent 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 was admitted on 6/16 for worsening anemia with Hb 6, worsening leg pain from ulcers and worsening renal function. He has gradual onset  progressive fatigue over the last couple of weeks. Patient is unable to ambulate because of his leg ulcers. In ER, he was started on vancomycin IV and zosyn.    1. Febrile syndrome. Right inguinal wound probable infection.  -obtain BC x 2 81 y/o male with h/o CAD s/p stents (LAD, RCA bare metal around 9/16), PVD (RLE stent/ angioplasty) complicated with poorly healing right inguinal wound s/p prior surgical debridment (by Dr Siu 5/20 ) A.Fib not on anticoagulation due to GI bleeding, Hypertension, COPD on home O2 2L, SHAYY on nocturnal BIPAP, Chronic diastolic CHF, Hyperlipidemia, PVD, Iron deficiency anemia, Chronic back pain, Gout, BPH, CKD III with baseline Cr 2, recent 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 was admitted on 6/16 for worsening anemia with Hb 6, worsening leg pain from ulcers and worsening renal function. He has gradual onset  progressive fatigue over the last couple of weeks. Patient is unable to ambulate because of his leg ulcers. In ER, he was started on vancomycin IV and zosyn.    1. Febrile syndrome. Right inguinal wound probable infection.  -obtain BC x 2  -obtain inguinal wound culture  -agree with vancomycin 500 mg IV q12h and zosyn 3.375 gm IV q8h  -reason for abx use and side effects reviewed with patient; monitor BMP and vancomycin trough levels   -local wound care per surgery  -monitor temps  -f/u CBC  -supportive care  2. Other issues: CAD s/p stents, PVD, A.Fib not on anticoagulation due to GI bleeding, Hypertension, COPD, SHAYY on nocturnal BIPAP, Chronic diastolic CHF, Hyperlipidemia, PVD, Iron deficiency anemia, Chronic back pain, Gout, BPH, CKD III   -care per medicine

## 2017-06-17 NOTE — PROGRESS NOTE ADULT - SUBJECTIVE AND OBJECTIVE BOX
Pt has been seen and examined with FP resident, resident supervised agree with a/p       Patient is a 82y old  Male who presents with a chief complaint of Sent from [facility] rehab because of low hemoglobin 7.7 (17 Jun 2017 00:37)        HPI:  82 year old male with history of CAD s/p stents (LAD, RCA bare metal around 9/16),PVD (RLE stent/ angioplasty and surgical debridment by Dr Siu 5/20 ) A.Fib not on anticoagulation due to GI bleeding, Hypertension, COPD on home O2 2L, SHAYY on nocturnal BIPAP, ex-smoker (smoked 1ppd X 50 years, quit 22 years ago),  Chronic diastolic CHF, Hyperlipidemia, PVD, Iron deficiency anemia, Chronic back pain, Gout, BPH, CKD III with baseline Cr 2. Was ecently admitted to  on  4/17 with anemia of GI bleeding, RLE and RUE DVTs,( received pRBCs and IVC filter discharged to rehab with aspirin) was readmitted to   ER on 5/4/17 for worsening anemia with Hb 6, worsening leg pain from ulcers and worsening renal function Cr 3.99 now presents from [facility] with anemia (7.7 on labs today decreasing from 9.5 over past few weeks). Pt c/o gradual onset  progressive fatigue over the last couple of weeks. Patient is unable to ambulate because of his leg cellulitis and ulcers. He denies any shortness of breath, palpitations / chest pain / light headedness. According to the daughter the attendants at [facility] did notice dark stools for the last couple of days. Patient denies any abdominal pain or change in bowel or urinary habits.  In ED, + guaiac. (17 Jun 2017 00:37)        PHYSICAL EXAM:  Vital Signs Last 24 Hrs  T(C): 36.7, Max: 38.3 (06-17 @ 04:03)  T(F): 98.1, Max: 100.9 (06-17 @ 04:03)  HR: 69 (69 - 89)  BP: 144/42 (132/71 - 182/44)  BP(mean): --  RR: 18 (16 - 20)  SpO2: 97% (94% - 98%)  general- comfortable   -rs-aeeb,cta  -cvs-s1s2 normal   -p/a-soft,bs+  -extremity- right leg groin wound and leg wound noted, eschar present in right heel and right side of leg  -cns- non focal         A/P    #Anemia-Anemia due to chronic disease of gi bleed   -monitor h/h closely, transfuse prn  -possible scope on monday     #fever- possibly from wound infection   -ct abx, local care, Dr. Mccracken evlauation appreciated     #DVT pr- high risk for dvt in view of immobile condition. Considering his wound in leg- not a candidate for SCD. Will start heparin for dvt pr and stop if any ovious  sing of bleeding noted or any drop in h/h   -closely monitor h/h

## 2017-06-17 NOTE — CONSULT NOTE ADULT - SUBJECTIVE AND OBJECTIVE BOX
Patient is a 82y old  Male who [facility] hemoglobin 7.7 (2017 00:37)    HPI:  81 y/o male with h/o CAD s/p stents (LAD, RCA bare metal around ), PVD (RLE stent/ angioplasty) complicated with poorly healing right inguinal wound s/p prior surgical debridment (by Dr Siu  ) A.Fib not on anticoagulation due to GI bleeding, Hypertension, COPD on home O2 2L, SHAYY on nocturnal BIPAP, Chronic diastolic CHF, Hyperlipidemia, PVD, Iron deficiency anemia, Chronic back pain, Gout, BPH, CKD III with baseline Cr 2, 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 He has gradual onset  progressive fatigue over the last couple of weeks. Patient is unable to ambulate because of his leg ulcers. In ER, he was started on vancomycin IV and zosyn.    PMH: as above  PSH: as above  Meds: per reconciliation sheet, noted below  MEDICATIONS  (STANDING):  buDESOnide   0.5 milliGRAM(s) Respule 0.5milliGRAM(s) Inhalation two times a day  aspirin enteric coated 81milliGRAM(s) Oral daily  oxyCODONE IR 5milliGRAM(s) Oral daily  doxazosin 8milliGRAM(s) Oral at bedtime  isosorbide   mononitrate ER Tablet (IMDUR) 120milliGRAM(s) Oral daily  diltiazem    Tablet 120milliGRAM(s) Oral every 12 hours  allopurinol 100milliGRAM(s) Oral daily  fenofibrate Tablet 145milliGRAM(s) Oral daily  simvastatin 10milliGRAM(s) Oral at bedtime  pramipexole 0.125milliGRAM(s) Oral three times a day  furosemide    Tablet 40milliGRAM(s) Oral daily  multivitamin Oral Tab/Cap - Peds 1Tablet(s) Oral daily  gabapentin 300milliGRAM(s) Oral at bedtime  metoprolol 12.5milliGRAM(s) Oral two times a day  senna 2Tablet(s) Oral at bedtime  pantoprazole  Injectable 40milliGRAM(s) IV Push every 12 hours  piperacillin/tazobactam IVPB. 3.375Gram(s) IV Intermittent every 8 hours  hydrALAZINE 150milliGRAM(s) Oral every 12 hours  sodium chloride 0.45%. 1000milliLiter(s) IV Continuous <Continuous>  potassium acid phosphate/sodium acid phosphate tablet (K-PHOS No. 2) 1Tablet(s) Oral four times a day with meals  vancomycin  IVPB 500milliGRAM(s) IV Intermittent every 12 hours    MEDICATIONS  (PRN):  nitroglycerin     SubLingual 0.4milliGRAM(s) SubLingual three times a day PRN Chest Pain  ondansetron Injectable 4milliGRAM(s) IV Push every 6 hours PRN Nausea  ALBUTerol    90 MICROgram(s) HFA Inhaler 2Puff(s) Inhalation every 6 hours PRN Shortness of Breath and/or Wheezing    Allergies    No Known Allergies    Intolerances      Social: smoked 1ppd X 50 years, quit 22 years ago, no alcohol, no illegal drugs; no recent travel, no exposure to TB  FAMILY HISTORY:  No pertinent family history in first degree relatives    ROS: the patient denies fever, no chills, no HA, no dizziness, no sore throat, no blurry vision, no CP, no palpitations, no SOB, no cough, no abdominal pain, no diarrhea, no N/V, no dysuria, no leg pain, has right inguinal wound, no joint aches, no rectal pain or bleeding, no night sweats; increased weakness    Vital Signs Last 24 Hrs  T(C): 36.7, Max: 38.3 ( @ 04:03)  T(F): 98.1, Max: 100.9 ( @ 04:03)  HR: 69 (69 - 89)  BP: 144/42 (132/71 - 182/44)  BP(mean): --  RR: 18 (16 - 20)  SpO2: 97% (94% - 98%)  Daily     Daily Weight in k (2017 05:56)    PE:    Constitutional: frail looking  HEENT: NC/AT, EOMI, PERRLA  Neck: supple  Back: no tenderness  Respiratory: decreased BS at bases  Cardiovascular: S1S2 regular, no murmurs  Abdomen: soft, not tender, not distended, positive BS  Genitourinary: right inguinal opne wound  Rectal: deferred  Musculoskeletal: no muscle tenderness, no joint swelling or tenderness  Extremities: no pedal edema  Neurological: AxOx3, moving all extremities, no focal deficits  Skin: no rashes    Labs:                        8.2    8.5   )-----------( 312      ( 2017 03:54 )             25.0     06-17    148<H>  |  114<H>  |  53<H>  ----------------------------<  129<H>  3.8   |  28  |  1.65<H>    Ca    8.2<L>      2017 03:54  Phos  1.9     -  Mg     2.1     -17    TPro  5.2<L>  /  Alb  1.7<L>  /  TBili  0.3  /  DBili  x   /  AST  20  /  ALT  12  /  AlkPhos  57  -17     LIVER FUNCTIONS - ( 2017 03:54 )  Alb: 1.7 g/dL / Pro: 5.2 gm/dL / ALK PHOS: 57 U/L / ALT: 12 U/L / AST: 20 U/L / GGT: x           Urinalysis Basic - ( 2017 10:36 )    Color: Yellow / Appearance: Clear / S.010 / pH: x  Gluc: x / Ketone: Negative  / Bili: Negative / Urobili: Negative mg/dL   Blood: x / Protein: 15 mg/dL / Nitrite: Negative   Leuk Esterase: Trace / RBC: x / WBC 0-2   Sq Epi: x / Non Sq Epi: x / Bacteria: x          Radiology:    Advanced directives addressed: full resuscitation Patient is a 82y old  Male who [facility] 7.7 (2017 00:37)    HPI:  83 y/o male with h/o CAD s/p stents (LAD, RCA bare metal around ), PVD (RLE stent/ angioplasty) complicated with poorly healing right inguinal wound s/p prior surgical debridment (by Dr Siu  ) A.Fib not on anticoagulation due to GI bleeding, Hypertension, COPD on home O2 2L, SHAYY on nocturnal BIPAP, Chronic diastolic CHF, Hyperlipidemia, PVD, Iron deficiency anemia, Chronic back pain, Gout, BPH, CKD III with baseline Cr 2, 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 He has gradual onset  progressive fatigue over the last couple of weeks. Patient is unable to ambulate because of his leg ulcers. In ER, he was started on vancomycin IV and zosyn.    PMH: as above  PSH: as above  Meds: per reconciliation sheet, noted below  MEDICATIONS  (STANDING):  buDESOnide   0.5 milliGRAM(s) Respule 0.5milliGRAM(s) Inhalation two times a day  aspirin enteric coated 81milliGRAM(s) Oral daily  oxyCODONE IR 5milliGRAM(s) Oral daily  doxazosin 8milliGRAM(s) Oral at bedtime  isosorbide   mononitrate ER Tablet (IMDUR) 120milliGRAM(s) Oral daily  diltiazem    Tablet 120milliGRAM(s) Oral every 12 hours  allopurinol 100milliGRAM(s) Oral daily  fenofibrate Tablet 145milliGRAM(s) Oral daily  simvastatin 10milliGRAM(s) Oral at bedtime  pramipexole 0.125milliGRAM(s) Oral three times a day  furosemide    Tablet 40milliGRAM(s) Oral daily  multivitamin Oral Tab/Cap - Peds 1Tablet(s) Oral daily  gabapentin 300milliGRAM(s) Oral at bedtime  metoprolol 12.5milliGRAM(s) Oral two times a day  senna 2Tablet(s) Oral at bedtime  pantoprazole  Injectable 40milliGRAM(s) IV Push every 12 hours  piperacillin/tazobactam IVPB. 3.375Gram(s) IV Intermittent every 8 hours  hydrALAZINE 150milliGRAM(s) Oral every 12 hours  sodium chloride 0.45%. 1000milliLiter(s) IV Continuous <Continuous>  potassium acid phosphate/sodium acid phosphate tablet (K-PHOS No. 2) 1Tablet(s) Oral four times a day with meals  vancomycin  IVPB 500milliGRAM(s) IV Intermittent every 12 hours    MEDICATIONS  (PRN):  nitroglycerin     SubLingual 0.4milliGRAM(s) SubLingual three times a day PRN Chest Pain  ondansetron Injectable 4milliGRAM(s) IV Push every 6 hours PRN Nausea  ALBUTerol    90 MICROgram(s) HFA Inhaler 2Puff(s) Inhalation every 6 hours PRN Shortness of Breath and/or Wheezing    Allergies    No Known Allergies    Intolerances      Social: smoked 1ppd X 50 years, quit 22 years ago, no alcohol, no illegal drugs; no recent travel, no exposure to TB  FAMILY HISTORY:  No pertinent family history in first degree relatives    ROS: the patient denies fever, no chills, no HA, no dizziness, no sore throat, no blurry vision, no CP, no palpitations, no SOB, no cough, no abdominal pain, no diarrhea, no N/V, no dysuria, no leg pain, has right inguinal wound, no joint aches, no rectal pain or bleeding, no night sweats; increased weakness    Vital Signs Last 24 Hrs  T(C): 36.7, Max: 38.3 ( @ 04:03)  T(F): 98.1, Max: 100.9 ( @ 04:03)  HR: 69 (69 - 89)  BP: 144/42 (132/71 - 182/44)  BP(mean): --  RR: 18 (16 - 20)  SpO2: 97% (94% - 98%)  Daily     Daily Weight in k (2017 05:56)    PE:    Constitutional: frail looking  HEENT: NC/AT, EOMI, PERRLA  Neck: supple  Back: no tenderness  Respiratory: decreased BS at bases  Cardiovascular: S1S2 regular, no murmurs  Abdomen: soft, not tender, not distended, positive BS  Genitourinary: right inguinal opne wound  Rectal: deferred  Musculoskeletal: no muscle tenderness, no joint swelling or tenderness  Extremities: 1+ pedal edema; right inguinal open wound - deep; scant discharge  Neurological: AxOx3, moving all extremities, no focal deficits  Skin: no rashes    Labs:                        8.2    8.5   )-----------( 312      ( 2017 03:54 )             25.0     06-17    148<H>  |  114<H>  |  53<H>  ----------------------------<  129<H>  3.8   |  28  |  1.65<H>    Ca    8.2<L>      2017 03:54  Phos  1.9       Mg     2.1         TPro  5.2<L>  /  Alb  1.7<L>  /  TBili  0.3  /  DBili  x   /  AST  20  /  ALT  12  /  AlkPhos  57       LIVER FUNCTIONS - ( 2017 03:54 )  Alb: 1.7 g/dL / Pro: 5.2 gm/dL / ALK PHOS: 57 U/L / ALT: 12 U/L / AST: 20 U/L / GGT: x           Urinalysis Basic - ( 2017 10:36 )    Color: Yellow / Appearance: Clear / S.010 / pH: x  Gluc: x / Ketone: Negative  / Bili: Negative / Urobili: Negative mg/dL   Blood: x / Protein: 15 mg/dL / Nitrite: Negative   Leuk Esterase: Trace / RBC: x / WBC 0-2   Sq Epi: x / Non Sq Epi: x / Bacteria: x          Radiology:    Advanced directives addressed: full resuscitation

## 2017-06-17 NOTE — H&P ADULT - PROBLEM SELECTOR PLAN 2
H/H q6h  type and cross match  transfuse if Hb<8 considering his cardiac history  GI consult- Dr Hector  start protonix 40mg IV

## 2017-06-17 NOTE — PROGRESS NOTE ADULT - ASSESSMENT
82 y M with recurrent anemia likely secondary to GIB presents from [facility] with finding of low hemoglobin

## 2017-06-17 NOTE — PATIENT PROFILE ADULT. - NUTRITION PROFILE
no indicators present pressure ulcer Stage 2 or greater/wound healing/no indicators present wound healing/pressure ulcer Stage 2 or greater

## 2017-06-17 NOTE — PROGRESS NOTE ADULT - PROBLEM SELECTOR PLAN 4
Baseline around 1.03 as May 2017  CKD stage II-III  ARYA on CKD   given IV fluids  Pre renal, Cr improved but not back to baseline. Cont to monitor

## 2017-06-17 NOTE — ED ADULT NURSE REASSESSMENT NOTE - COMFORT CARE
plan of care explained/side rails up/wait time explained/repositioned
wait time explained/side rails up/repositioned/plan of care explained/meal provided

## 2017-06-17 NOTE — H&P ADULT - PROBLEM SELECTOR PLAN 1
likely 2/2 GIB  admit to med surg  H/H likely 2/2 GIB  FOBT positive in ED  admit to med surg  H/H q6h  type and cross match  transfuse if Hb<8 considering his cardiac history  GI consult- Dr Hector

## 2017-06-17 NOTE — PROGRESS NOTE ADULT - PROBLEM SELECTOR PLAN 2
H/H q6h  type and cross match  transfuse if Hb<8 considering his cardiac history  GI consult- Dr Hector  on protonix 40mg IV BID

## 2017-06-17 NOTE — PROGRESS NOTE ADULT - PROBLEM SELECTOR PLAN 3
hold ASA secondary to GIB and d./w Cardio to hold for procedure . Pt has a Bare metal stent   EKG on admission  does not show any acute changes  c/w fenofibrate  145 mg ,simvastatin 10 mg, metoprolol tartratePO 12.5 BID  ,cardizem 120mg BID  and imdur  mg

## 2017-06-17 NOTE — H&P ADULT - RS GEN PE MLT RESP DETAILS PC
no intercostal retractions/clear to auscultation bilaterally/no wheezes/airway patent/normal/breath sounds equal/no rhonchi/no rales/respirations non-labored/good air movement

## 2017-06-17 NOTE — H&P ADULT - NSHPPHYSICALEXAM_GEN_ALL_CORE
ICU Vital Signs Last 24 Hrs    HR: 89 (89 - 89)  BP: 138/78 (138/78 - 138/78)    RR: 20 (20 - 20)  SpO2: 96% (96% - 96%)

## 2017-06-17 NOTE — PROGRESS NOTE ADULT - SUBJECTIVE AND OBJECTIVE BOX
HPI:  82 year old male with history of CAD s/p stents (LAD, RCA bare metal around ),PVD (RLE stent/ angioplasty and surgical debridment by Dr Siu  ) GISELLAPaul not on anticoagulation due to GI bleeding, Hypertension, COPD on home O2 2L, SHAYY on nocturnal BIPAP, ex-smoker (smoked 1ppd X 50 years, quit 22 years ago),  Chronic diastolic CHF, Hyperlipidemia, PVD, Iron deficiency anemia, Chronic back pain, Gout, BPH, CKD III with baseline Cr 2. Was ecently admitted to  on   with anemia of GI bleeding, RLE and RUE DVTs,( received pRBCs and IVC filter discharged to rehab with aspirin) was readmitted to   ER on 17 for worsening anemia with Hb 6, worsening leg pain from ulcers and worsening renal function Cr 3.99 now presents from [facility] with anemia (7.7 on labs today decreasing from 9.5 over past few weeks). Pt c/o gradual onset  progressive fatigue over the last couple of weeks. Patient is unable to ambulate because of his leg cellulitis and ulcers. He denies any shortness of breath, palpitations / chest pain / light headedness. According to the daughter the attendants at [facility] did notice dark stools for the last couple of days. Patient denies any abdominal pain or change in bowel or urinary habits.  In ED, + guaiac. (2017 00:37)      T(C): 36.7, Max: 38.3 (-17 @ 04:03)  HR: 69 (69 - 89)  BP: 144/42 (132/71 - 182/44)  RR: 18 (16 - 20)  SpO2: 97% (94% - 98%)  Wt(kg): --                              8.2    8.5   )-----------( 312      ( 2017 03:54 )             25.0     2017 03:54    148    |  114    |  53     ----------------------------<  129    3.8     |  28     |  1.65     Ca    8.2        2017 03:54  Phos  1.9       2017 03:54  Mg     2.1       2017 03:54    TPro  5.2    /  Alb  1.7    /  TBili  0.3    /  DBili  x      /  AST  20     /  ALT  12     /  AlkPhos  57     2017 03:54    PT/INR - ( 2017 19:32 )   PT: 13.8 sec;   INR: 1.27 ratio         PTT - ( 2017 19:32 )  PTT:35.2 sec  CAPILLARY BLOOD GLUCOSE    LIVER FUNCTIONS - ( 2017 03:54 )  Alb: 1.7 g/dL / Pro: 5.2 gm/dL / ALK PHOS: 57 U/L / ALT: 12 U/L / AST: 20 U/L / GGT: x           Urinalysis Basic - ( 2017 10:36 )    Color: Yellow / Appearance: Clear / S.010 / pH: x  Gluc: x / Ketone: Negative  / Bili: Negative / Urobili: Negative mg/dL   Blood: x / Protein: 15 mg/dL / Nitrite: Negative   Leuk Esterase: Trace / RBC: x / WBC 0-2   Sq Epi: x / Non Sq Epi: x / Bacteria: x      REVIEW OF SYSTEMS:    CONSTITUTIONAL: weak debilitated  EYES/ENT: No visual changes;  No vertigo or throat pain   NECK: No pain or stiffness  RESPIRATORY: No cough, wheezing, hemoptysis; No shortness of breath  CARDIOVASCULAR: No chest pain or palpitations  GASTROINTESTINAL: No abdominal or epigastric pain. No nausea, vomiting, or hematemesis; No diarrhea or constipation. reports cookie prior to coming in . no further BM   GENITOURINARY: No dysuria, frequency or hematuria  NEUROLOGICAL: No numbness or weakness  SKIN: c/o pain b/l limbs ,   All other review of systems is negative unless indicated above.    PHYSICAL EXAM:    GENERAL: weak debilitated and in discomfort   HEAD:  NC/AT  EYES: EOMI, PERRLA, no scleral icterus  HEENT: Moist mucous membranes  NECK: Supple, No JVD  CNS: non focal  LUNG: Clear to auscultation bilaterally; No rales, rhonchi, wheezing, or rubs  HEART: S1 s2 + ,irregular  No murmurs, rubs, or gallops  ABDOMEN: +BS, ST/ND/NT  GENITOURINARY- Voiding, Bladder not distended  EXTREMITIES:   wound over right leg  and right heel with eschar  and groin , wound on left leg HPI:  82 year old male with history of CAD s/p stents (LAD, RCA bare metal around ),PVD (RLE stent/ angioplasty and surgical debridment by Dr Siu  ) GISELLAPaul not on anticoagulation due to GI bleeding, Hypertension, COPD on home O2 2L, SHAYY on nocturnal BIPAP, ex-smoker (smoked 1ppd X 50 years, quit 22 years ago),  Chronic diastolic CHF, Hyperlipidemia, PVD, Iron deficiency anemia, Chronic back pain, Gout, BPH, CKD III with baseline Cr 2. Was ecently admitted to  on   with anemia of GI bleeding, RLE and RUE DVTs,( received pRBCs and IVC filter discharged to rehab with aspirin) was readmitted to   ER on 17 for worsening anemia with Hb 6, worsening leg pain from ulcers and worsening renal function Cr 3.99 now presents from [facility] with anemia (7.7 on labs today decreasing from 9.5 over past few weeks). Pt c/o gradual onset  progressive fatigue over the last couple of weeks. Patient is unable to ambulate because of his leg cellulitis and ulcers. He denies any shortness of breath, palpitations / chest pain / light headedness. According to the daughter the attendants at [facility] did notice dark stools for the last couple of days. Patient denies any abdominal pain or change in bowel or urinary habits.  In ED, + guaiac. (2017 00:37)      T(C): 36.7, Max: 38.3 (-17 @ 04:03)  HR: 69 (69 - 89)  BP: 144/42 (132/71 - 182/44)  RR: 18 (16 - 20)  SpO2: 97% (94% - 98%)  Wt(kg): --                              8.2    8.5   )-----------( 312      ( 2017 03:54 )             25.0     2017 03:54    148    |  114    |  53     ----------------------------<  129    3.8     |  28     |  1.65     Ca    8.2        2017 03:54  Phos  1.9       2017 03:54  Mg     2.1       2017 03:54    TPro  5.2    /  Alb  1.7    /  TBili  0.3    /  DBili  x      /  AST  20     /  ALT  12     /  AlkPhos  57     2017 03:54    PT/INR - ( 2017 19:32 )   PT: 13.8 sec;   INR: 1.27 ratio         PTT - ( 2017 19:32 )  PTT:35.2 sec  CAPILLARY BLOOD GLUCOSE    LIVER FUNCTIONS - ( 2017 03:54 )  Alb: 1.7 g/dL / Pro: 5.2 gm/dL / ALK PHOS: 57 U/L / ALT: 12 U/L / AST: 20 U/L / GGT: x           Urinalysis Basic - ( 2017 10:36 )    Color: Yellow / Appearance: Clear / S.010 / pH: x  Gluc: x / Ketone: Negative  / Bili: Negative / Urobili: Negative mg/dL   Blood: x / Protein: 15 mg/dL / Nitrite: Negative   Leuk Esterase: Trace / RBC: x / WBC 0-2   Sq Epi: x / Non Sq Epi: x / Bacteria: x      REVIEW OF SYSTEMS:    CONSTITUTIONAL: weak debilitated  EYES/ENT: No visual changes;  No vertigo or throat pain   NECK: No pain or stiffness  RESPIRATORY: No cough, wheezing, hemoptysis; No shortness of breath  CARDIOVASCULAR: No chest pain or palpitations  GASTROINTESTINAL: No abdominal or epigastric pain. No nausea, vomiting, or hematemesis; No diarrhea or constipation. reports cookie prior to coming in . no further BM   GENITOURINARY: No dysuria, frequency or hematuria  NEUROLOGICAL: No numbness or weakness  SKIN: c/o pain b/l limbs ,   All other review of systems is negative unless indicated above.    PHYSICAL EXAM:    GENERAL: weak debilitated and in discomfort   HEAD:  NC/AT  EYES: EOMI, PERRLA, no scleral icterus  HEENT: Moist mucous membranes  NECK: Supple, No JVD  CNS: non focal  LUNG: Clear to auscultation bilaterally; No rales, rhonchi, wheezing, or rubs  HEART: S1 s2 + ,irregular  No murmurs, rubs, or gallops  ABDOMEN: +BS, ST/ND/NT  GENITOURINARY- Voiding, Bladder not distended  EXTREMITIES:   wound over right leg  and right heel with eschar  and groin , wound on left leg    CXR     IMPRESSION: No acute disease.

## 2017-06-17 NOTE — CONSULT NOTE ADULT - PROBLEM SELECTOR RECOMMENDATION 9
- Pt. planned for endoscopy.  Given h/o BMS and only single vessel disease, it is relatively safe to temporarily stop aspirin, however, it should be restarted as soon as possible.  - tte to assess EF

## 2017-06-17 NOTE — H&P ADULT - HISTORY OF PRESENT ILLNESS
82 year old male with history of CAD s/p stents (LAD, RCA bare metal around 9/16),PVD (RLE stent/ angioplasty and surgical debridment by Dr Siu 5/20 ) A.Fib not on anticoagulation due to GI bleeding, Hypertension, COPD on home O2 2L, SHAYY on nocturnal BIPAP, ex-smoker (smoked 1ppd X 50 years, quit 22 years ago),  Chronic diastolic CHF, Hyperlipidemia, PVD, Iron deficiency anemia, Chronic back pain, Gout, BPH, CKD III with baseline Cr 2. Was ecently admitted to  on  4/17 with anemia of GI bleeding, RLE and RUE DVTs,( received pRBCs and IVC filter discharged to rehab with aspirin) was readmitted to   ER on 5/4/17 for worsening anemia with Hb 6, worsening leg pain from ulcers and worsening renal function Cr 3.99 now presents from [facility] with anemia (7.7 on labs today decreasing from 9.5 over past few weeks). Pt c/o gradual onset  progressive fatigue over the last couple of weeks. Patient is unable to ambulate because of his leg cellulitis and ulcers. He denies any shortness of breath, palpitations / chest pain / light headedness. According to the daughter the attendants at [facility] did notice dark stools for the last couple of days. Patient denies any abdominal pain or change in bowel or urinary habits.  In ED, + guaiac.

## 2017-06-17 NOTE — PROGRESS NOTE ADULT - PROBLEM SELECTOR PLAN 1
likely secondary to upper GIB  FOBT positive in ED, hx of cookie   admit to med surg  H/H q6h  type and cross match  transfuse if Hb<8 considering his cardiac history  GI consult- Dr Hector/Kaur Parks  Plan for Endoscopy on monday   Hold ASA as d/w Carido   Diet started on Clear liquid

## 2017-06-18 LAB
ANION GAP SERPL CALC-SCNC: 8 MMOL/L — SIGNIFICANT CHANGE UP (ref 5–17)
BUN SERPL-MCNC: 46 MG/DL — HIGH (ref 7–23)
CALCIUM SERPL-MCNC: 8.1 MG/DL — LOW (ref 8.5–10.1)
CHLORIDE SERPL-SCNC: 113 MMOL/L — HIGH (ref 96–108)
CO2 SERPL-SCNC: 27 MMOL/L — SIGNIFICANT CHANGE UP (ref 22–31)
CREAT SERPL-MCNC: 1.45 MG/DL — HIGH (ref 0.5–1.3)
GLUCOSE SERPL-MCNC: 99 MG/DL — SIGNIFICANT CHANGE UP (ref 70–99)
HCT VFR BLD CALC: 25.5 % — LOW (ref 39–50)
HCT VFR BLD CALC: 26.4 % — LOW (ref 39–50)
HGB BLD-MCNC: 8.1 G/DL — LOW (ref 13–17)
HGB BLD-MCNC: 8.4 G/DL — LOW (ref 13–17)
MCHC RBC-ENTMCNC: 28.4 PG — SIGNIFICANT CHANGE UP (ref 27–34)
MCHC RBC-ENTMCNC: 31.7 GM/DL — LOW (ref 32–36)
MCV RBC AUTO: 89.6 FL — SIGNIFICANT CHANGE UP (ref 80–100)
PLATELET # BLD AUTO: 349 K/UL — SIGNIFICANT CHANGE UP (ref 150–400)
POTASSIUM SERPL-MCNC: 3.4 MMOL/L — LOW (ref 3.5–5.3)
POTASSIUM SERPL-SCNC: 3.4 MMOL/L — LOW (ref 3.5–5.3)
RBC # BLD: 2.95 M/UL — LOW (ref 4.2–5.8)
RBC # FLD: 16.3 % — HIGH (ref 10.3–14.5)
SODIUM SERPL-SCNC: 148 MMOL/L — HIGH (ref 135–145)
WBC # BLD: 8.1 K/UL — SIGNIFICANT CHANGE UP (ref 3.8–10.5)
WBC # FLD AUTO: 8.1 K/UL — SIGNIFICANT CHANGE UP (ref 3.8–10.5)

## 2017-06-18 PROCEDURE — 73718 MRI LOWER EXTREMITY W/O DYE: CPT | Mod: 26,RT

## 2017-06-18 RX ORDER — ACETAMINOPHEN 500 MG
325 TABLET ORAL EVERY 4 HOURS
Qty: 0 | Refills: 0 | Status: DISCONTINUED | OUTPATIENT
Start: 2017-06-18 | End: 2017-06-20

## 2017-06-18 RX ORDER — POTASSIUM CHLORIDE 20 MEQ
20 PACKET (EA) ORAL
Qty: 0 | Refills: 0 | Status: COMPLETED | OUTPATIENT
Start: 2017-06-18 | End: 2017-06-18

## 2017-06-18 RX ORDER — HYDROMORPHONE HYDROCHLORIDE 2 MG/ML
2 INJECTION INTRAMUSCULAR; INTRAVENOUS; SUBCUTANEOUS EVERY 6 HOURS
Qty: 0 | Refills: 0 | Status: DISCONTINUED | OUTPATIENT
Start: 2017-06-18 | End: 2017-06-20

## 2017-06-18 RX ADMIN — PIPERACILLIN AND TAZOBACTAM 25 GRAM(S): 4; .5 INJECTION, POWDER, LYOPHILIZED, FOR SOLUTION INTRAVENOUS at 21:32

## 2017-06-18 RX ADMIN — Medication 20 MILLIEQUIVALENT(S): at 15:24

## 2017-06-18 RX ADMIN — Medication 0.5 MILLIGRAM(S): at 20:17

## 2017-06-18 RX ADMIN — Medication 100 MILLIGRAM(S): at 11:36

## 2017-06-18 RX ADMIN — HEPARIN SODIUM 5000 UNIT(S): 5000 INJECTION INTRAVENOUS; SUBCUTANEOUS at 07:02

## 2017-06-18 RX ADMIN — Medication 20 MILLIEQUIVALENT(S): at 18:41

## 2017-06-18 RX ADMIN — Medication 1 TABLET(S): at 17:28

## 2017-06-18 RX ADMIN — HEPARIN SODIUM 5000 UNIT(S): 5000 INJECTION INTRAVENOUS; SUBCUTANEOUS at 15:24

## 2017-06-18 RX ADMIN — Medication 40 MILLIGRAM(S): at 05:59

## 2017-06-18 RX ADMIN — ISOSORBIDE MONONITRATE 120 MILLIGRAM(S): 60 TABLET, EXTENDED RELEASE ORAL at 11:36

## 2017-06-18 RX ADMIN — SIMVASTATIN 10 MILLIGRAM(S): 20 TABLET, FILM COATED ORAL at 21:32

## 2017-06-18 RX ADMIN — Medication 1 TABLET(S): at 09:27

## 2017-06-18 RX ADMIN — HYDROMORPHONE HYDROCHLORIDE 2 MILLIGRAM(S): 2 INJECTION INTRAMUSCULAR; INTRAVENOUS; SUBCUTANEOUS at 18:41

## 2017-06-18 RX ADMIN — Medication 150 MILLIGRAM(S): at 05:59

## 2017-06-18 RX ADMIN — Medication 20 MILLIEQUIVALENT(S): at 17:28

## 2017-06-18 RX ADMIN — PANTOPRAZOLE SODIUM 40 MILLIGRAM(S): 20 TABLET, DELAYED RELEASE ORAL at 05:59

## 2017-06-18 RX ADMIN — Medication 1 TABLET(S): at 11:36

## 2017-06-18 RX ADMIN — GABAPENTIN 300 MILLIGRAM(S): 400 CAPSULE ORAL at 21:32

## 2017-06-18 RX ADMIN — Medication 12.5 MILLIGRAM(S): at 17:30

## 2017-06-18 RX ADMIN — Medication 1 TABLET(S): at 21:33

## 2017-06-18 RX ADMIN — Medication 1 TABLET(S): at 12:30

## 2017-06-18 RX ADMIN — PANTOPRAZOLE SODIUM 40 MILLIGRAM(S): 20 TABLET, DELAYED RELEASE ORAL at 17:29

## 2017-06-18 RX ADMIN — Medication 100 MILLIGRAM(S): at 05:59

## 2017-06-18 RX ADMIN — PRAMIPEXOLE DIHYDROCHLORIDE 0.12 MILLIGRAM(S): 0.12 TABLET ORAL at 21:32

## 2017-06-18 RX ADMIN — Medication 0.5 MILLIGRAM(S): at 08:18

## 2017-06-18 RX ADMIN — PIPERACILLIN AND TAZOBACTAM 25 GRAM(S): 4; .5 INJECTION, POWDER, LYOPHILIZED, FOR SOLUTION INTRAVENOUS at 15:24

## 2017-06-18 RX ADMIN — Medication 12.5 MILLIGRAM(S): at 05:59

## 2017-06-18 RX ADMIN — PRAMIPEXOLE DIHYDROCHLORIDE 0.12 MILLIGRAM(S): 0.12 TABLET ORAL at 15:24

## 2017-06-18 RX ADMIN — PIPERACILLIN AND TAZOBACTAM 25 GRAM(S): 4; .5 INJECTION, POWDER, LYOPHILIZED, FOR SOLUTION INTRAVENOUS at 07:15

## 2017-06-18 RX ADMIN — HYDROMORPHONE HYDROCHLORIDE 2 MILLIGRAM(S): 2 INJECTION INTRAMUSCULAR; INTRAVENOUS; SUBCUTANEOUS at 12:31

## 2017-06-18 RX ADMIN — PRAMIPEXOLE DIHYDROCHLORIDE 0.12 MILLIGRAM(S): 0.12 TABLET ORAL at 05:59

## 2017-06-18 RX ADMIN — Medication 150 MILLIGRAM(S): at 17:28

## 2017-06-18 RX ADMIN — Medication 100 MILLIGRAM(S): at 18:41

## 2017-06-18 RX ADMIN — Medication 145 MILLIGRAM(S): at 11:36

## 2017-06-18 RX ADMIN — Medication 8 MILLIGRAM(S): at 21:32

## 2017-06-18 NOTE — PROGRESS NOTE ADULT - ASSESSMENT
83 yo man admitted with anemia and dark stools, improved s/p transfusion    -Diet as tolerated, NPO p MN  -Cardiology input appreciated  -Continue PPI  -Transfuse PRN  -Possible endoscopy Monday; discussed with Dr. Hernández. He will follow up am CBC to determine whether to proceed with EGD tomorrow.

## 2017-06-18 NOTE — PROGRESS NOTE ADULT - SUBJECTIVE AND OBJECTIVE BOX
Patient is a 82y old  Male who presents with a chief complaint of [facility] because of low hemoglobin 7.7 (2017 00:37)    HPI:  81 y/o male with h/o CAD s/p stents (LAD, RCA bare metal around ), PVD (RLE stent/ angioplasty) complicated with poorly healing right inguinal wound s/p prior surgical debridment (by Dr Siu  ) A.Fib not on anticoagulation due to GI bleeding, Hypertension, COPD on home O2 2L, SHAYY on nocturnal BIPAP, Chronic diastolic CHF, Hyperlipidemia, PVD, Iron deficiency anemia, Chronic back pain, Gout, BPH, CKD III with baseline Cr 2, 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 He has gradual onset  progressive fatigue over the last couple of weeks. Patient is unable to ambulate because of his leg ulcers. In ER, he was started on vancomycin IV and zosyn.    Lying in bed in NAD  Weak looking  Fever is down    MEDICATIONS  (STANDING):  buDESOnide   0.5 milliGRAM(s) Respule 0.5milliGRAM(s) Inhalation two times a day  doxazosin 8milliGRAM(s) Oral at bedtime  isosorbide   mononitrate ER Tablet (IMDUR) 120milliGRAM(s) Oral daily  diltiazem    Tablet 120milliGRAM(s) Oral every 12 hours  allopurinol 100milliGRAM(s) Oral daily  fenofibrate Tablet 145milliGRAM(s) Oral daily  simvastatin 10milliGRAM(s) Oral at bedtime  pramipexole 0.125milliGRAM(s) Oral three times a day  furosemide    Tablet 40milliGRAM(s) Oral daily  multivitamin Oral Tab/Cap - Peds 1Tablet(s) Oral daily  gabapentin 300milliGRAM(s) Oral at bedtime  metoprolol 12.5milliGRAM(s) Oral two times a day  senna 2Tablet(s) Oral at bedtime  pantoprazole  Injectable 40milliGRAM(s) IV Push every 12 hours  piperacillin/tazobactam IVPB. 3.375Gram(s) IV Intermittent every 8 hours  hydrALAZINE 150milliGRAM(s) Oral every 12 hours  potassium acid phosphate/sodium acid phosphate tablet (K-PHOS No. 2) 1Tablet(s) Oral four times a day with meals  vancomycin  IVPB 500milliGRAM(s) IV Intermittent every 12 hours  heparin  Injectable 5000Unit(s) SubCutaneous every 8 hours    MEDICATIONS  (PRN):  nitroglycerin     SubLingual 0.4milliGRAM(s) SubLingual three times a day PRN Chest Pain  ondansetron Injectable 4milliGRAM(s) IV Push every 6 hours PRN Nausea  ALBUTerol    90 MICROgram(s) HFA Inhaler 2Puff(s) Inhalation every 6 hours PRN Shortness of Breath and/or Wheezing      Vital Signs Last 24 Hrs  T(C): 36.2, Max: 36.9 ( @ 17:21)  T(F): 97.1, Max: 98.5 ( @ 17:21)  HR: 74 (66 - 91)  BP: 161/43 (129/55 - 166/41)  BP(mean): --  RR: 18 (18 - 18)  SpO2: 99% (97% - 99%)    Physical Exam:      Constitutional: frail looking  HEENT: NC/AT, EOMI, PERRLA  Neck: supple  Back: no tenderness  Respiratory: decreased BS at bases  Cardiovascular: S1S2 regular, no murmurs  Abdomen: soft, not tender, not distended, positive BS  Genitourinary: right inguinal open wound  Rectal: deferred  Musculoskeletal: no muscle tenderness, no joint swelling or tenderness  Extremities: 1+ pedal edema; right inguinal open wound - deep; scant discharge; right heel ulcer  Neurological: AxOx3, moving all extremities, no focal deficits  Skin: no rashes    Labs:                        8.2    8.5   )-----------( 312      ( 2017 03:54 )             25.0     -17    148<H>  |  114<H>  |  53<H>  ----------------------------<  129<H>  3.8   |  28  |  1.65<H>    Ca    8.2<L>      2017 03:54  Phos  1.9       Mg     2.1         TPro  5.2<L>  /  Alb  1.7<L>  /  TBili  0.3  /  DBili  x   /  AST  20  /  ALT  12  /  AlkPhos  57       LIVER FUNCTIONS - ( 2017 03:54 )  Alb: 1.7 g/dL / Pro: 5.2 gm/dL / ALK PHOS: 57 U/L / ALT: 12 U/L / AST: 20 U/L / GGT: x           Urinalysis Basic - ( 2017 10:36 )    Color: Yellow / Appearance: Clear / S.010 / pH: x  Gluc: x / Ketone: Negative  / Bili: Negative / Urobili: Negative mg/dL   Blood: x / Protein: 15 mg/dL / Nitrite: Negative   Leuk Esterase: Trace / RBC: x / WBC 0-2   Sq Epi: x / Non Sq Epi: x / Bacteria: x    Culture - Blood (17 @ 03:54)    Specimen Source: .Blood Blood    Culture Results:   No growth to date.          Radiology:    Advanced directives addressed: full resuscitation

## 2017-06-18 NOTE — PROGRESS NOTE ADULT - SUBJECTIVE AND OBJECTIVE BOX
HPI:  83 yo man with extensive medical history was admitted with anemia. Outpt labs 7.7 at [facility], decreased from 9.5 a few weeks ago. +progressive weakness and fatigue. +recent dark stools. Patient had admission in March for similar issues. EGD was notable for esophagitis, gastric ulcers and duodenal dieulafoy lesion. Colonoscopy demonstrated diverticulosis, transverse colon subepithelial lesion and internal hemorrhoids. Patient was transfused with appropriate response. Hb now stable. No abdominal pain, n/v. Tolerating PO.    MEDICATIONS  (STANDING):  buDESOnide   0.5 milliGRAM(s) Respule 0.5milliGRAM(s) Inhalation two times a day  doxazosin 8milliGRAM(s) Oral at bedtime  isosorbide   mononitrate ER Tablet (IMDUR) 120milliGRAM(s) Oral daily  diltiazem    Tablet 120milliGRAM(s) Oral every 12 hours  allopurinol 100milliGRAM(s) Oral daily  fenofibrate Tablet 145milliGRAM(s) Oral daily  simvastatin 10milliGRAM(s) Oral at bedtime  pramipexole 0.125milliGRAM(s) Oral three times a day  multivitamin Oral Tab/Cap - Peds 1Tablet(s) Oral daily  gabapentin 300milliGRAM(s) Oral at bedtime  metoprolol 12.5milliGRAM(s) Oral two times a day  senna 2Tablet(s) Oral at bedtime  pantoprazole  Injectable 40milliGRAM(s) IV Push every 12 hours  piperacillin/tazobactam IVPB. 3.375Gram(s) IV Intermittent every 8 hours  hydrALAZINE 150milliGRAM(s) Oral every 12 hours  potassium acid phosphate/sodium acid phosphate tablet (K-PHOS No. 2) 1Tablet(s) Oral four times a day with meals  vancomycin  IVPB 500milliGRAM(s) IV Intermittent every 12 hours  heparin  Injectable 5000Unit(s) SubCutaneous every 8 hours  potassium chloride    Tablet ER 20milliEquivalent(s) Oral every 2 hours    MEDICATIONS  (PRN):  nitroglycerin     SubLingual 0.4milliGRAM(s) SubLingual three times a day PRN Chest Pain  ondansetron Injectable 4milliGRAM(s) IV Push every 6 hours PRN Nausea  ALBUTerol    90 MICROgram(s) HFA Inhaler 2Puff(s) Inhalation every 6 hours PRN Shortness of Breath and/or Wheezing  HYDROmorphone   Tablet 2milliGRAM(s) Oral every 6 hours PRN Severe Pain (7 - 10)  acetaminophen   Tablet. 325milliGRAM(s) Oral every 4 hours PRN Moderate Pain (4 - 6)      Allergies    No Known Allergies    Intolerances        REVIEW OF SYSTEMS    General: no fever    HEENT: no icterus    Respiratory and Thorax: no SOB  	  Cardiovascular: no CP    Gastrointestinal: no dysphagia, heartburn, n/v, abdominal pain, diarrhea, constipation or blood in the stool    Skin: no jaundice      Vital Signs Last 24 Hrs  T(C): 36.2, Max: 36.9 (06-17 @ 17:21)  T(F): 97.1, Max: 98.5 (06-17 @ 17:21)  HR: 74 (66 - 91)  BP: 161/43 (129/55 - 166/41)  BP(mean): --  RR: 18 (18 - 18)  SpO2: 99% (97% - 99%)    PHYSICAL EXAM:    Constitutional: NAD    HEENT: anicteric    Respiratory: CTA BL    Cardiovascular:  RRR    Gastrointestinal: soft ND +BS NTTP    Neuro: no focal deficits    Skin: no jaundice      LABS:                        8.4    8.1   )-----------( 349      ( 18 Jun 2017 06:14 )             26.4     06-18    148<H>  |  113<H>  |  46<H>  ----------------------------<  99  3.4<L>   |  27  |  1.45<H>    Ca    8.1<L>      18 Jun 2017 06:14  Phos  1.9     06-17  Mg     2.1     06-17    TPro  5.2<L>  /  Alb  1.7<L>  /  TBili  0.3  /  DBili  x   /  AST  20  /  ALT  12  /  AlkPhos  57  06-17    PT/INR - ( 16 Jun 2017 19:32 )   PT: 13.8 sec;   INR: 1.27 ratio         PTT - ( 16 Jun 2017 19:32 )  PTT:35.2 sec  LIVER FUNCTIONS - ( 17 Jun 2017 03:54 )  Alb: 1.7 g/dL / Pro: 5.2 gm/dL / ALK PHOS: 57 U/L / ALT: 12 U/L / AST: 20 U/L / GGT: x             RADIOLOGY & ADDITIONAL STUDIES:

## 2017-06-18 NOTE — PROGRESS NOTE ADULT - PROBLEM SELECTOR PLAN 5
right leg ulcer 1) lower leg   2)  right groin ( foul smelling and purulent)   -Possible cuase for fatigue   local wound culture from groin sent   - Normal Lactate   Cont with Zosyn 3.375 gm Q8H + Vancomycin 500 gm Q12H Day # 2 of Abx   - wound care consult   - ID consult appreciated   - surgery consult appreciated no acute surgical intervention  Vascular input noted. right leg ulcer 1) lower leg   2)  right groin ( foul smelling and purulent)   -Possible cause for fatigue   local wound culture from groin sent   - Normal Lactate   Cont with Zosyn 3.375 gm Q8H + Vancomycin 500 gm Q12H Day # 2 of Abx   - wound care consult   - ID consult appreciated   - surgery consult appreciated no acute surgical intervention  - pt may need possible muscle flap for groin by Plastic surgery

## 2017-06-18 NOTE — CONSULT NOTE ADULT - SUBJECTIVE AND OBJECTIVE BOX
History of Present Illness:  Chief Complaint/Reason for Admission: Sent from [facility] rehab because of low hemoglobin 7.7	  History of Present Illness: 	  82 year old male with history of CAD s/p stents (LAD, RCA bare metal around ),PVD (RLE stent/ angioplasty and surgical debridment by Dr Siu  ) Brianna not on anticoagulation due to GI bleeding, Hypertension, COPD on home O2 2L, SHAYY on nocturnal BIPAP, ex-smoker (smoked 1ppd X 50 years, quit 22 years ago),  Chronic diastolic CHF, Hyperlipidemia, PVD, Iron deficiency anemia, Chronic back pain, Gout, BPH, CKD III with baseline Cr 2. Was ecently admitted to  on   with anemia of GI bleeding, RLE and RUE DVTs,( received pRBCs and IVC filter discharged to rehab with aspirin) was readmitted to   ER on 17 for worsening anemia with Hb 6, worsening leg pain from ulcers and worsening renal function Cr 3.99 now presents from [facility] with anemia (7.7 on labs today decreasing from 9.5 over past few weeks). Pt c/o gradual onset  progressive fatigue over the last couple of weeks. Patient is unable to ambulate because of his leg cellulitis and ulcers. He denies any shortness of breath, palpitations / chest pain / light headedness. According to the daughter the attendants at [facility] did notice dark stools for the last couple of days. Patient denies any abdominal pain or change in bowel or urinary habits.  In ED, + guaiac.       Recent hospitalization for ishcemic R foot and leg undergoing R iliac stenting and then R CFA endarterectomy and bovine pericardial patch and debridement of ischemic eschar R foot and leg.  R groin incision dehisced.              Review of Systems:  · Negative General Symptoms	no fever; no chills; no anorexia; no weight loss	  · General Symptoms	fatigue; weakness	  · Negative Respiratory and Thorax Symptoms	no wheezing; no dyspnea; no cough; no hemoptysis; no pleuritic chest pain	  · Negative Cardiovascular Symptoms	no chest pain; no palpitations; no dyspnea on exertion; no orthopnea; no paroxysmal nocturnal dyspnea	  · Negative Gastrointestinal Symptoms	no nausea; no vomiting; no diarrhea; no constipation; no change in bowel habits; no abdominal pain	  · Negative General Genitourinary Symptoms	no hematuria; no flank pain L; no flank pain R; no urine discoloration; no dysuria; no urinary hesitancy	  · Musculoskeletal Symptoms	leg pain L; leg pain R; has stasis dermatitis and ulcers in both legs	  · Neurological	negative	      Allergies and Intolerances:        Allergies:  	No Known Allergies:     Home Medications:   * Patient Currently Takes Medications as of 23-May-2017 14:18 documented in Order   · 	acetaminophen-oxycodone 325 mg-5 mg oral tablet: 1 tab(s) orally every 4 hours, As needed, Moderate Pain (4 - 6)  · 	oxyCODONE 10 mg oral tablet: 1 tab(s) orally every 4 hours, As needed, Severe Pain (7 - 10)  · 	furosemide 80 mg oral tablet: 1 tab(s) orally once a day  · 	albuterol 90 mcg/inh inhalation aerosol: 2 puff(s) inhaled every 6 hours  · 	acetaminophen 325 mg oral tablet: 2 tab(s) orally every 6 hours, As needed, For Temp greater than 38 C (100.4 F)  · 	aluminum hydroxide-magnesium hydroxide 200 mg-200 mg/5 mL oral suspension: 30 milliliter(s) orally every 6 hours, As needed, Dyspepsia  · 	senna oral tablet: 2 tab(s) orally once a day (at bedtime)  · 	simethicone 80 mg oral tablet, chewable: 1 tab(s) orally 2 times a day  · 	fluticasone 50 mcg/inh nasal spray: 1 spray(s) nasal 2 times a day  · 	pantoprazole 40 mg oral delayed release tablet: 1 tab(s) orally 2 times a day (before meals)  · 	nitroglycerin 0.4 mg sublingual tablet: 1 tab(s) sublingual every 5 minutes, As Needed for chest pain  · 	allopurinol 100 mg oral tablet: 1 tab(s) orally once a day  · 	Ecotrin Adult Low Strength 81 mg oral delayed release tablet: 1 tab(s) orally once a day  · 	budesonide 0.5 mg/2 mL inhalation suspension: 2 milliliter(s) inhaled 2 times a day, As Needed  · 	diltiazem 120 mg/12 hours oral capsule, extended release: 1 cap(s) orally every 12 hours  · 	fenofibrate 145 mg oral tablet: 1 tab(s) orally once a day  · 	ferrous sulfate 325 mg (65 mg elemental iron) oral delayed release tablet: 1 tab(s) orally 3 times a day  · 	Fish Oil 1000 mg oral capsule: 1 cap(s) orally 2 times a day  · 	DuoNeb 0.5 mg-2.5 mg/3 mL inhalation solution: 3 milliliter(s) inhaled 4 times a day, As Needed  · 	Vitamin C 1000 mg oral tablet: 1 tab(s) orally once a day  · 	Vitamin D3 2000 intl units oral capsule: 1 cap(s) orally once a day  · 	Vitamin B Compound Stron tab(s) orally once a day  · 	Chondroitin-Glucosamine 200 mg-250 mg oral capsule: 1 cap(s) orally 2 times a day  · 	isosorbide mononitrate 120 mg oral tablet, extended release: 1 tab(s) orally once a day (in the morning)  · 	levocetirizine 5 mg oral tablet: 1 tab(s) orally once a day (in the evening)  · 	pramipexole 0.125 mg oral tablet: 1 tab(s) orally 3 times a day  · 	ranitidine 150 mg oral capsule: 1 cap(s) orally once a day (at bedtime)  · 	simvastatin 10 mg oral tablet: 1 tab(s) orally once a day (at bedtime)  · 	metoprolol tartrate 25 mg oral tablet: 0.5 tab(s) orally 2 times a day  · 	gabapentin 300 mg oral capsule: 2 cap(s) orally once a day (at bedtime)  · 	gabapentin 300 mg oral capsule: 1 tab(s) orally once a day (in the morning)  · 	ammonium lactate topical cream: Apply topically to affected area 2 times a day  · 	doxazosin 8 mg oral tablet: 1 tab(s) orally once a day (at bedtime)  · 	hydrALAZINE: 150 milligram(s) orally 2 times a day    Patient History:   Past Medical History:  Atelectasis    Benign prostatic hypertrophy    CAD (coronary artery disease)    CRI (chronic renal insufficiency)    Dyspepsia  On moderate exertion.  GERD (gastroesophageal reflux disease)    Gout    Hypercholesterolemia    Hypertension    Peripheral edema    Pleural effusion, bilateral    Respiratory failure    Sepsis, due to unspecified organism  2/2 poorly healing wounds b/l  Sleep apnea, obstructive  Requires home 02 therapy, and treatment with BIPAP  Spinal stenosis.    Past Surgical History:  Cataract of left eye    Prostate  Surgery green light procedure.  S/P angioplasty    S/P angioplasty with stent    S/P rotator cuff surgery  Right.    Family History:  No pertinent family history in first degree relatives.    Social History:  Social History (marital status, living situation, occupation, tobacco use, alcohol and drug use, and sexual history): non smoker no etoh abuse lives at [facility]	    Tobacco Screening:  · Core Measure Site	No	  · Has the patient used tobacco in the past 30 days?	No	    Risk Assessment:   Present on Admission:  Deep Venous Thrombosis	no	  Pulmonary Embolus	no	  Urinary Catheter	no	  Central Venous Catheter/PICC Line	no	  Surgical Site Incision	yes, right leg	  Pressure Ulcer(s)	yes	    Heart Failure:  Does this patient have a history of or has been diagnosed with heart failure? yes.     LV Function Assessment (LVS function was evaluated before arrival and/or during hospitalization) yes.     Is the Ejection Fraction >40% ? yes.     normal LV function.      Physical Exam:  Physical Exam: ICU Vital Signs Last 24 Hrs  HR: 89 (89 - 89) BP: 138/78 (138/78 - 138/78)  RR: 20 (20 - 20) SpO2: 96% (96% - 96%)	    Physical Exam:  · Constitutional	detailed exam	  · Constitutional Details	no distress	  · Neck	detailed exam	  · Neck Details	no JVD	  · Respiratory	detailed exam	  · Respiratory Details	normal; airway patent; breath sounds equal; good air movement; respirations non-labored; clear to auscultation bilaterally; no intercostal retractions; no rales; no rhonchi; no wheezes	  · Cardiovascular	detailed exam	  · Cardiovascular Details	regular rate and rhythm	  · Cardiovascular Details	positive S1; positive S2	  · Gastrointestinal	detailed exam	  · GI Normal	normal; soft; nontender; no distention; no masses palpable; bowel sounds normal; no rebound tenderness	  · Extremities	R groin incision open with fibrinous debris at base, patch not exposed; distal leg wounds granulating with scattered areas of eschar, tendon on lateral leg exposed and necrotic; foot intact; L leg with stasis changes but no cellulitis or ulcers	  · Extremities Comments		  · Vascular		  · DP Pulse		  · Neurological	detailed exam	  · Skin	detailed exam	  · Wound	clean  right leg	  · Musculoskeletal	detailed exam	      Laboratory:   Blood Bank:	    2017 19:32, Antibody Screen Interpretation	  Antibody Screen: NEG	    2017 19:32, Type + Screen	  Type + Screen: A POS	  General Chemistry:	    2017 19:32, Comprehensive Metabolic Panel	  Sodium, Serum:    146, [135 - 145 mmol/L]	  Potassium, Serum: 3.9, [3.5 - 5.3 mmol/L]	  Chloride, Serum:    112, [96 - 108 mmol/L]	  Carbon Dioxide, Serum: 27, [22 - 31 mmol/L]	  Anion Gap, Serum: 7, [5 - 17 mmol/L]	  Blood Urea Nitrogen, Serum:    55, [7 - 23 mg/dL]	  Creatinine, Serum:    1.75, [0.50 - 1.30 mg/dL]	  Glucose, Serum:    116, [70 - 99 mg/dL]	  Calcium, Total Serum:    8.2, [8.5 - 10.1 mg/dL]	  Protein Total, Serum:    5.2, [6.0 - 8.3 gm/dL]	  Albumin, Serum:    1.7, [3.3 - 5.0 g/dL]	  Bilirubin Total, Serum: 0.2, [0.2 - 1.2 mg/dL]	  Alkaline Phosphatase, Serum: 56, [40 - 120 U/L]	  Aspartate Aminotransferase (AST/SGOT): 17, [15 - 37 U/L]	  Alanine Aminotransferase (ALT/SGPT):    8, [12 - 78 U/L]	  eGFR if Non :    35, [>=60 mL/min/1.73M2], Interpretative commentThe units for eGFR are ml/min/1.73m2 (normalized body surface area). TheeGFR is calculated from a serum creatinine using the CKD-EPI equation.Other variables required for calculation are race, age and sex. Amongpatients with chronic kidney disease (CKD), the eGFR is useful indetermining the stage of disease according to KDOQI CKD classification.All eGFR results are reported numerically with the followinginterpretation.        GFR                    With                 Without   (ml/min/1.73 m2)    Kidney Damage       Kidney Damage      >= 90                    Stage 1                     Normal      60-89                    Stage 2                     Decreased GFR      30-59     Stage 3                     Stage 3      15-29                    Stage 4                     Stage 4      < 15                      Stage 5                     Stage 5Each stage of CKD assumes that the associated GFR level has been ineffect for at least 3 months. Determination of stages one and two (witheGFR > 59 ml/min/m2) requires estimation of kidney damage for at least 3months as defined by structural or functional abnormalities.Limitations: All estimates of GFR will be less accurate for patients atextremes of muscle mass (including but not limited to frail elderly,critically ill, or cancer patients), those with unusual diets, and thosewith conditions associated with reduced secretion or extrarenalelimination of creatinine. The eGFR equation is not recommended for usein patients with unstable creatinine levels.	  eGFR if :    41, [>=60 mL/min/1.73M2]	  Coagulation:	    2017 19:32, Activated Partial Thromboplastin Time	  Activated Partial Thromboplastin Time: 35.2, [27.5 - 37.4 sec], The recommended therapeutic heparin range (full dose) is 58-99 seconds.Recommended therapeutic Argatroban range is 1.5 to 3.0 times the baselineAPTT value, not to exceed 100 seconds. Recommended therapeutic Refludanrange is 1.5 to 2.5 times thebaseline APTT.	    2017 19:32, Prothrombin Time and INR, Plasma	  Prothrombin Time, Plasma:    13.8, [9.8 - 12.7 sec], Effective , the reference range for PT has changed.	  INR:    1.27, [0.88 - 1.16 ratio]	  Hematology:	    2017 19:32, Complete Blood Count + Automated Diff	  WBC Count: 8.1, [3.8 - 10.5 K/uL]	  RBC Count:    2.94, [4.20 - 5.80 M/uL]	  Hemoglobin:    8.5, [13.0 - 17.0 g/dL]	  Hematocrit:    25.9, [39.0 - 50.0 %]	  Mean Cell Volume: 88.2, [80.0 - 100.0 fl]	  Mean Cell Hemoglobin: 28.9, [27.0 - 34.0 pg]	  Mean Cell Hemoglobin Conc: 32.8, [32.0 - 36.0 gm/dL]	  Red Cell Distrib Width:    15.8, [10.3 - 14.5 %]	  Platelet Count - Automated: 284, [150 - 400 K/uL]	  Auto Neutrophil #: 6.5, [1.8 - 7.4 K/uL]	  Auto Lymphocyte #:    0.7, [1.0 - 3.3 K/uL]	  Auto Monocyte #: 0.7, [0.0 - 0.9 K/uL]	  Auto Eosinophil #: 0.2, [0.0 - 0.5 K/uL]	  Auto Basophil #: 0.1, [0.0 - 0.2 K/uL]	  Auto Neutrophil %:    79.7, [43.0 - 77.0 %], Differential percentages must be correlated with absolute numbers forclinical significance.	  Auto Lymphocyte %:    8.2, [13.0 - 44.0 %]	  Auto Monocyte %: 8.6, [2.0 - 14.0 %]	  Auto Eosinophil %: 2.5, [0.0 - 6.0 %]	  Auto Basophil %: 1.1, [0.0 - 2.0 %]	    2017 19:32, Manual Differential	  Platelet Morphology: Normal, [Normal]	  Hypochromia: Slight	  Elliptocytes: Slight	  Rouleaux Formation: Present	  Anisocytosis: Slight	  Red Cell Morphology:    Abnormal, [Normal]	  Comment - Hematology: Results verified by smear review.	  Microcytosis: Slight	  Poikilocytosis: Slight	    Assessment and Plan:   Assessment:  · Assessment		  82 y M with recurrent anemia likely secondary to GIB presents from [facility] with finding of low hemoglobin     Problem/Plan - 1:  ·  Problem: Anemia.  Plan: likely 2/2 GIB  FOBT positive in ED  admit to med surg  H/H q6h  type and cross match  transfuse if Hb<8 considering his cardiac history  GI consult- Dr Hector.     Problem/Plan - 2:  ·  Problem: GI bleeding.  Plan: H/H q6h  type and cross match  transfuse if Hb<8 considering his cardiac history  GI consult- Dr Hector  start protonix 40mg IV.     Problem/Plan - 3:  ·  Problem: CAD (coronary artery disease).  Plan: hold ASA 2/2 GIB  EKG : does not show any acute changes  continue fenofibrate  continue simvastatin   continue metoprolol tartrate  continue cardizem 120mg   continue imdur.     Problem/Plan - 4:  ·  Problem: CRI (chronic renal insufficiency).  Plan: Baseline around 1.03 as May 2017  Now acute on chronic RF  Start IVF  Avoid nephrotoxic medications.     Problem/Plan - 5:  ·  Problem: Leg ulcer.  Plan: right leg ulcer 1) lower leg   2)  right groin ( foul smelling and purulent)   - temp 100.8  - send pus for culture  - trend serum lactate  - vancomycin stat followed by ID consult  - start dylon  will speak with Plastics (Dr. Arreaga) re muscle flap coverage of groin  - ID consult  - surgery consultDaily dressings   monitor for signs of infection  being followed up by Dr Siu.     Problem/Plan - 6:  Problem: Sleep apnea, obstructive. Plan: continue BIPAP.    Problem/Plan - 7:  ·  Problem: COPD (chronic obstructive pulmonary disease).  Plan: continue Proair  continue Budesonidecontinue Proair  continue Budenoside.     Problem/Plan - 8:  ·  Problem: Hypertension.  Plan: continue hydralazine  continue doxazosin.     Problem/Plan - 9:  ·  Problem: Gout.  Plan: continue allopurinol.     Problem/Plan - 10:  Problem: Diastolic congestive heart failure, unspecified congestive heart failure chronicity. Plan; continue furosemide  continue metoprolol tartrate.      Attending Statement:  81 y/o M CAD s/p stents, PVD, AFib not on A/C due to GI bleeding, HTN, COPD on home O2 2L, SHAYY on nocturnal BIPAP, Chronic diastolic CHF, HLD, Chronic back pain, Gout, BPH, CKD3, was referred to the ED from [facility] for further evaluation of anemia noted on labs was 7.7 on labs today. Pt c/o fatigue only, denies any associated symptoms. Pt also had reportedly "dark stools" of the last few days.    1)Anemia likely 2/2 GIB  ~admit to Medicine  ~serical CBCs  ~transfuse prn  ~f/u w/ GI consultation in the am  ~cont. PPI    2)CAD  ~will hold ASA for now  ~cont. home meds for now    3)ARYA on AKD  ~trial of IVF  ~f/u repeat labs  ~avoid nephrotoxic medications        5)Left Groin wound; likely infected  ~f/u w/ wound care in the am  ~f/u PAN C+S  ~cont. wound care (W-to-D dressings)    6)SHAYY  ~cont. BiPAP qhs    7)COPD  ~cont. home meds.     )

## 2017-06-18 NOTE — PROGRESS NOTE ADULT - PROBLEM SELECTOR PLAN 1
likely secondary to upper GIB vs ACD  FOBT positive in ED, hx of cookie   - f/u H/H  type and cross match  transfuse if Hb<8 considering his cardiac history  GI consult- Dr Hector/Kaur Parks  NPO for Endoscopy on monday   Hold ASA as d/w Carido   Diet started on Clear liquid likely secondary to upper GIB vs ACD  FOBT positive in ED, hx of melena   - f/u H/H  type and cross match  transfuse if Hb<8 considering his cardiac history  GI consult- Dr Hector/Kaur Parks  NPO for Endoscopy on monday   Hold ASA as d/w Carido   Diet started on Clear liquid

## 2017-06-18 NOTE — PROGRESS NOTE ADULT - SUBJECTIVE AND OBJECTIVE BOX
HPI:  82 year old male with history of CAD s/p stents (LAD, RCA bare metal around 9/16),PVD (RLE stent/ angioplasty and surgical debridment by Dr Siu 5/20 ) A.Paul not on anticoagulation due to GI bleeding, Hypertension, COPD on home O2 2L, SHAYY on nocturnal BIPAP, ex-smoker (smoked 1ppd X 50 years, quit 22 years ago),  Chronic diastolic CHF, Hyperlipidemia, PVD, Iron deficiency anemia, Chronic back pain, Gout, BPH, CKD III with baseline Cr 2. Was ecently admitted to  on  4/17 with anemia of GI bleeding, RLE and RUE DVTs,( received pRBCs and IVC filter discharged to rehab with aspirin) was readmitted to   ER on 5/4/17 for worsening anemia with Hb 6, worsening leg pain from ulcers and worsening renal function Cr 3.99 [facility] (7.7 on labs today decreasing from 9.5 over past few weeks). Pt c/o gradual onset  progressive fatigue over the last couple of weeks. Patient is unable to ambulate because of his leg cellulitis and ulcers. He denies any shortness of breath, palpitations / chest pain / light headedness. According to the [facility] for the last couple of days. Patient denies any abdominal pain or change in bowel or urinary habits.  In ED, + guaiac. (17 Jun 2017 00:37)      6/18/18: Pt seen and examined at bedside. Currently stable. Denies any current complaints      Vital Signs Last 24 Hrs  T(C): 36.2, Max: 36.9 (06-17 @ 17:21)  T(F): 97.1, Max: 98.5 (06-17 @ 17:21)  HR: 74 (66 - 91)  BP: 161/43 (129/55 - 166/41)  BP(mean): --  RR: 18 (18 - 18)  SpO2: 99% (97% - 99%)      Lab Results:  CBC  CBC Full  -  ( 18 Jun 2017 06:14 )  WBC Count : 8.1 K/uL  Hemoglobin : 8.4 g/dL  Hematocrit : 26.4 %  Platelet Count - Automated : 349 K/uL  Mean Cell Volume : 89.6 fl  Mean Cell Hemoglobin : 28.4 pg  Mean Cell Hemoglobin Concentration : 31.7 gm/dL  Auto Neutrophil # : x  Auto Lymphocyte # : x  Auto Monocyte # : x  Auto Eosinophil # : x  Auto Basophil # : x  Auto Neutrophil % : x  Auto Lymphocyte % : x  Auto Monocyte % : x  Auto Eosinophil % : x  Auto Basophil % : x    .		Differential:	[] Automated		[] Manual  Chemistry  06-18    148<H>  |  113<H>  |  46<H>  ----------------------------<  99  3.4<L>   |  27  |  1.45<H>    Ca    8.1<L>      18 Jun 2017 06:14  Phos  1.9     06-17  Mg     2.1     06-17    TPro  5.2<L>  /  Alb  1.7<L>  /  TBili  0.3  /  DBili  x   /  AST  20  /  ALT  12  /  AlkPhos  57  06-17    LIVER FUNCTIONS - ( 17 Jun 2017 03:54 )  Alb: 1.7 g/dL / Pro: 5.2 gm/dL / ALK PHOS: 57 U/L / ALT: 12 U/L / AST: 20 U/L / GGT: x           PT/INR - ( 16 Jun 2017 19:32 )   PT: 13.8 sec;   INR: 1.27 ratio         PTT - ( 16 Jun 2017 19:32 )  PTT:35.2 sec      MICROBIOLOGY/CULTURES:  Culture Results:   No growth to date. (06-17 @ 03:54)  Culture Results:   Few Mixed gram negative rods  Few Enterococcus species (06-17 @ 03:50)    RADIOLOGY RESULTS:    Toxicities (with grade)  1.  2.  3.  4.      REVIEW OF SYSTEMS:    CONSTITUTIONAL: weak debilitated  EYES/ENT: No visual changes;  No vertigo or throat pain   NECK: No pain or stiffness  RESPIRATORY: No cough, wheezing, hemoptysis; No shortness of breath  CARDIOVASCULAR: No chest pain or palpitations  GASTROINTESTINAL: No abdominal or epigastric pain. No nausea, vomiting, or hematemesis; No diarrhea or constipation. reports cookie prior to coming in . no further BM   GENITOURINARY: No dysuria, frequency or hematuria  NEUROLOGICAL: No numbness or weakness  SKIN: c/o pain b/l limbs ,   All other review of systems is negative unless indicated above.    PHYSICAL EXAM:    GENERAL: weak debilitated and in discomfort   HEAD:  NC/AT  EYES: EOMI, PERRLA, no scleral icterus  HEENT: Moist mucous membranes  NECK: Supple, No JVD  CNS: non focal  LUNG: Clear to auscultation bilaterally; No rales, rhonchi, wheezing, or rubs  HEART: S1 s2 + ,irregular  No murmurs, rubs, or gallops  ABDOMEN: +BS, ST/ND/NT  GENITOURINARY- Voiding, Bladder not distended  EXTREMITIES:   wound over right leg  and right heel with eschar  and groin , wound on left leg    CXR 6/16    IMPRESSION: No acute disease.

## 2017-06-18 NOTE — PROGRESS NOTE ADULT - SUBJECTIVE AND OBJECTIVE BOX
Pt has been seen and examined with FP resident, resident supervised agree with a/p       Patient is a 82y old  Male who presents with a chief complaint of Sent from [facility] rehab because of low hemoglobin 7.7 (17 Jun 2017 00:37)        HPI:  82 year old male with history of CAD s/p stents (LAD, RCA bare metal around 9/16),PVD (RLE stent/ angioplasty and surgical debridment by Dr Siu 5/20 ) A.Fib not on anticoagulation due to GI bleeding, Hypertension, COPD on home O2 2L, SHAYY on nocturnal BIPAP, ex-smoker (smoked 1ppd X 50 years, quit 22 years ago),  Chronic diastolic CHF, Hyperlipidemia, PVD, Iron deficiency anemia, Chronic back pain, Gout, BPH, CKD III with baseline Cr 2. Was ecently admitted to  on  4/17 with anemia of GI bleeding, RLE and RUE DVTs,( received pRBCs and IVC filter discharged to rehab with aspirin) was readmitted to   ER on 5/4/17 for worsening anemia with Hb 6, worsening leg pain from ulcers and worsening renal function Cr 3.99 now presents from [facility] with anemia (7.7 on labs today decreasing from 9.5 over past few weeks). Pt c/o gradual onset  progressive fatigue over the last couple of weeks. Patient is unable to ambulate because of his leg cellulitis and ulcers. He denies any shortness of breath, palpitations / chest pain / light headedness. According to the daughter the attendants at [facility] did notice dark stools for the last couple of days. Patient denies any abdominal pain or change in bowel or urinary habits.  In ED, + guaiac. (17 Jun 2017 00:37)        PHYSICAL EXAM:  Vital Signs Last 24 Hrs  T(C): 36.2, Max: 36.9 (06-17 @ 17:21)  T(F): 97.1, Max: 98.5 (06-17 @ 17:21)  HR: 74 (66 - 91)  BP: 161/43 (129/55 - 166/41)  BP(mean): --  RR: 18 (18 - 18)  SpO2: 99% (97% - 99%)  general- comfortable   -rs-aeeb,cta  -cvs-s1s2 normal   -p/a-soft,bs+  -extremity- right leg groin wound and leg wound noted, eschar present in right heel and right side of leg  -cns- non focal         A/P    #Anemia-Anemia due to chronic disease or gi bleed   -monitor h/h closely, transfuse prn- so far stable h/h, no reported bleeding here in hospital, give normal diet and npo from midnight for possible EGD tomorrow    #Hypernatremia- monitor it, hold off on lasix and monitor it, free water intake advised     #fever- possibly from wound infection   -ct abx, local care, Dr. Mccracken evlauation appreciated     #DVT pr-heparin, no drop in h/h so far

## 2017-06-18 NOTE — PROGRESS NOTE ADULT - ASSESSMENT
81 y/o male with h/o CAD s/p stents (LAD, RCA bare metal around 9/16), PVD (RLE stent/ angioplasty) complicated with poorly healing right inguinal wound s/p prior surgical debridment (by Dr Siu 5/20 ) A.Fib not on anticoagulation due to GI bleeding, Hypertension, COPD on home O2 2L, SHAYY on nocturnal BIPAP, Chronic diastolic CHF, Hyperlipidemia, PVD, Iron deficiency anemia, Chronic back pain, Gout, BPH, CKD III with baseline Cr 2, recent 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 was admitted on 6/16 for worsening anemia with Hb 6, worsening leg pain from ulcers and worsening renal function. He has gradual onset  progressive fatigue over the last couple of weeks. Patient is unable to ambulate because of his leg ulcers. In ER, he was started on vancomycin IV and zosyn.    1. Febrile syndrome improving. Right inguinal wound probable infection. Right heel ulcer ?underlying OM.  -slightly better  -f/u BC x 2  -f/u inguinal wound culture  -on vancomycin 500 mg IV q12h and zosyn 3.375 gm IV q8h # 2  -tolerating abx well so far; no side effects noted  -obtain vancomycin trough levels   -for MRI foot  -local wound care per surgery  -monitor temps  -f/u CBC  -supportive care  2. Other issues: CAD s/p stents, PVD, A.Fib not on anticoagulation due to GI bleeding, Hypertension, COPD, SHAYY on nocturnal BIPAP, Chronic diastolic CHF, Hyperlipidemia, PVD, Iron deficiency anemia, Chronic back pain, Gout, BPH, CKD III   -care per medicine

## 2017-06-18 NOTE — PROGRESS NOTE ADULT - ASSESSMENT
82 y M with recurrent anemia likely secondary to GIB presents from [facility] with finding of low hemoglobin

## 2017-06-19 LAB
ANION GAP SERPL CALC-SCNC: 8 MMOL/L — SIGNIFICANT CHANGE UP (ref 5–17)
BLD GP AB SCN SERPL QL: SIGNIFICANT CHANGE UP
BUN SERPL-MCNC: 40 MG/DL — HIGH (ref 7–23)
C DIFF BY PCR RESULT: SIGNIFICANT CHANGE UP
C DIFF TOX GENS STL QL NAA+PROBE: SIGNIFICANT CHANGE UP
CALCIUM SERPL-MCNC: 8.1 MG/DL — LOW (ref 8.5–10.1)
CHLORIDE SERPL-SCNC: 116 MMOL/L — HIGH (ref 96–108)
CO2 SERPL-SCNC: 27 MMOL/L — SIGNIFICANT CHANGE UP (ref 22–31)
CREAT SERPL-MCNC: 1.38 MG/DL — HIGH (ref 0.5–1.3)
CULTURE RESULTS: SIGNIFICANT CHANGE UP
GLUCOSE SERPL-MCNC: 97 MG/DL — SIGNIFICANT CHANGE UP (ref 70–99)
HCT VFR BLD CALC: 24.3 % — LOW (ref 39–50)
HCT VFR BLD CALC: 25.2 % — LOW (ref 39–50)
HGB BLD-MCNC: 7.7 G/DL — LOW (ref 13–17)
HGB BLD-MCNC: 8.1 G/DL — LOW (ref 13–17)
MCHC RBC-ENTMCNC: 28.3 PG — SIGNIFICANT CHANGE UP (ref 27–34)
MCHC RBC-ENTMCNC: 32 GM/DL — SIGNIFICANT CHANGE UP (ref 32–36)
MCV RBC AUTO: 88.4 FL — SIGNIFICANT CHANGE UP (ref 80–100)
OB PNL STL: NEGATIVE — SIGNIFICANT CHANGE UP
PLATELET # BLD AUTO: 331 K/UL — SIGNIFICANT CHANGE UP (ref 150–400)
POTASSIUM SERPL-MCNC: 3.6 MMOL/L — SIGNIFICANT CHANGE UP (ref 3.5–5.3)
POTASSIUM SERPL-SCNC: 3.6 MMOL/L — SIGNIFICANT CHANGE UP (ref 3.5–5.3)
RBC # BLD: 2.85 M/UL — LOW (ref 4.2–5.8)
RBC # FLD: 15.9 % — HIGH (ref 10.3–14.5)
SODIUM SERPL-SCNC: 151 MMOL/L — HIGH (ref 135–145)
SPECIMEN SOURCE: SIGNIFICANT CHANGE UP
TYPE + AB SCN PNL BLD: SIGNIFICANT CHANGE UP
VANCOMYCIN TROUGH SERPL-MCNC: 14.2 UG/ML — SIGNIFICANT CHANGE UP (ref 10–20)
WBC # BLD: 7 K/UL — SIGNIFICANT CHANGE UP (ref 3.8–10.5)
WBC # FLD AUTO: 7 K/UL — SIGNIFICANT CHANGE UP (ref 3.8–10.5)

## 2017-06-19 PROCEDURE — 93306 TTE W/DOPPLER COMPLETE: CPT | Mod: 26

## 2017-06-19 RX ORDER — SODIUM CHLORIDE 9 MG/ML
1000 INJECTION INTRAMUSCULAR; INTRAVENOUS; SUBCUTANEOUS
Qty: 0 | Refills: 0 | Status: DISCONTINUED | OUTPATIENT
Start: 2017-06-19 | End: 2017-06-19

## 2017-06-19 RX ORDER — SODIUM CHLORIDE 9 MG/ML
1000 INJECTION INTRAMUSCULAR; INTRAVENOUS; SUBCUTANEOUS
Qty: 0 | Refills: 0 | Status: DISCONTINUED | OUTPATIENT
Start: 2017-06-20 | End: 2017-06-20

## 2017-06-19 RX ORDER — SODIUM CHLORIDE 9 MG/ML
1000 INJECTION, SOLUTION INTRAVENOUS
Qty: 0 | Refills: 0 | Status: DISCONTINUED | OUTPATIENT
Start: 2017-06-19 | End: 2017-06-19

## 2017-06-19 RX ORDER — SODIUM CHLORIDE 9 MG/ML
1000 INJECTION, SOLUTION INTRAVENOUS
Qty: 0 | Refills: 0 | Status: DISCONTINUED | OUTPATIENT
Start: 2017-06-19 | End: 2017-06-20

## 2017-06-19 RX ADMIN — PANTOPRAZOLE SODIUM 40 MILLIGRAM(S): 20 TABLET, DELAYED RELEASE ORAL at 18:03

## 2017-06-19 RX ADMIN — HEPARIN SODIUM 5000 UNIT(S): 5000 INJECTION INTRAVENOUS; SUBCUTANEOUS at 14:49

## 2017-06-19 RX ADMIN — Medication 1 TABLET(S): at 12:35

## 2017-06-19 RX ADMIN — PIPERACILLIN AND TAZOBACTAM 25 GRAM(S): 4; .5 INJECTION, POWDER, LYOPHILIZED, FOR SOLUTION INTRAVENOUS at 06:49

## 2017-06-19 RX ADMIN — PIPERACILLIN AND TAZOBACTAM 25 GRAM(S): 4; .5 INJECTION, POWDER, LYOPHILIZED, FOR SOLUTION INTRAVENOUS at 14:52

## 2017-06-19 RX ADMIN — SODIUM CHLORIDE 50 MILLILITER(S): 9 INJECTION, SOLUTION INTRAVENOUS at 18:08

## 2017-06-19 RX ADMIN — Medication 100 MILLIGRAM(S): at 18:02

## 2017-06-19 RX ADMIN — Medication 12.5 MILLIGRAM(S): at 18:03

## 2017-06-19 RX ADMIN — PANTOPRAZOLE SODIUM 40 MILLIGRAM(S): 20 TABLET, DELAYED RELEASE ORAL at 06:24

## 2017-06-19 RX ADMIN — GABAPENTIN 300 MILLIGRAM(S): 400 CAPSULE ORAL at 22:53

## 2017-06-19 RX ADMIN — Medication 150 MILLIGRAM(S): at 09:08

## 2017-06-19 RX ADMIN — Medication 12.5 MILLIGRAM(S): at 09:09

## 2017-06-19 RX ADMIN — SODIUM CHLORIDE 50 MILLILITER(S): 9 INJECTION, SOLUTION INTRAVENOUS at 14:48

## 2017-06-19 RX ADMIN — SIMVASTATIN 10 MILLIGRAM(S): 20 TABLET, FILM COATED ORAL at 22:54

## 2017-06-19 RX ADMIN — Medication 150 MILLIGRAM(S): at 18:06

## 2017-06-19 RX ADMIN — ISOSORBIDE MONONITRATE 120 MILLIGRAM(S): 60 TABLET, EXTENDED RELEASE ORAL at 12:35

## 2017-06-19 RX ADMIN — Medication 0.5 MILLIGRAM(S): at 21:02

## 2017-06-19 RX ADMIN — Medication 8 MILLIGRAM(S): at 22:53

## 2017-06-19 RX ADMIN — Medication 100 MILLIGRAM(S): at 05:50

## 2017-06-19 RX ADMIN — Medication 100 MILLIGRAM(S): at 12:35

## 2017-06-19 RX ADMIN — PIPERACILLIN AND TAZOBACTAM 25 GRAM(S): 4; .5 INJECTION, POWDER, LYOPHILIZED, FOR SOLUTION INTRAVENOUS at 22:54

## 2017-06-19 RX ADMIN — SODIUM CHLORIDE 50 MILLILITER(S): 9 INJECTION, SOLUTION INTRAVENOUS at 15:41

## 2017-06-19 RX ADMIN — PRAMIPEXOLE DIHYDROCHLORIDE 0.12 MILLIGRAM(S): 0.12 TABLET ORAL at 14:54

## 2017-06-19 RX ADMIN — Medication 145 MILLIGRAM(S): at 12:35

## 2017-06-19 RX ADMIN — SENNA PLUS 2 TABLET(S): 8.6 TABLET ORAL at 22:54

## 2017-06-19 RX ADMIN — HEPARIN SODIUM 5000 UNIT(S): 5000 INJECTION INTRAVENOUS; SUBCUTANEOUS at 22:53

## 2017-06-19 RX ADMIN — Medication 0.5 MILLIGRAM(S): at 07:41

## 2017-06-19 RX ADMIN — PRAMIPEXOLE DIHYDROCHLORIDE 0.12 MILLIGRAM(S): 0.12 TABLET ORAL at 09:09

## 2017-06-19 RX ADMIN — PRAMIPEXOLE DIHYDROCHLORIDE 0.12 MILLIGRAM(S): 0.12 TABLET ORAL at 22:54

## 2017-06-19 NOTE — PROGRESS NOTE ADULT - SUBJECTIVE AND OBJECTIVE BOX
for muscle flap coverage of R groin tomorrow    MRI noted, unfavorable    MEDICATIONS  (STANDING):  buDESOnide   0.5 milliGRAM(s) Respule 0.5milliGRAM(s) Inhalation two times a day  doxazosin 8milliGRAM(s) Oral at bedtime  isosorbide   mononitrate ER Tablet (IMDUR) 120milliGRAM(s) Oral daily  diltiazem    Tablet 120milliGRAM(s) Oral every 12 hours  allopurinol 100milliGRAM(s) Oral daily  fenofibrate Tablet 145milliGRAM(s) Oral daily  simvastatin 10milliGRAM(s) Oral at bedtime  pramipexole 0.125milliGRAM(s) Oral three times a day  multivitamin Oral Tab/Cap - Peds 1Tablet(s) Oral daily  gabapentin 300milliGRAM(s) Oral at bedtime  metoprolol 12.5milliGRAM(s) Oral two times a day  senna 2Tablet(s) Oral at bedtime  pantoprazole  Injectable 40milliGRAM(s) IV Push every 12 hours  piperacillin/tazobactam IVPB. 3.375Gram(s) IV Intermittent every 8 hours  hydrALAZINE 150milliGRAM(s) Oral every 12 hours  vancomycin  IVPB 500milliGRAM(s) IV Intermittent every 12 hours  heparin  Injectable 5000Unit(s) SubCutaneous every 8 hours  dextrose 5%. 1000milliLiter(s) IV Continuous <Continuous>    MEDICATIONS  (PRN):  nitroglycerin     SubLingual 0.4milliGRAM(s) SubLingual three times a day PRN Chest Pain  ondansetron Injectable 4milliGRAM(s) IV Push every 6 hours PRN Nausea  ALBUTerol    90 MICROgram(s) HFA Inhaler 2Puff(s) Inhalation every 6 hours PRN Shortness of Breath and/or Wheezing  HYDROmorphone   Tablet 2milliGRAM(s) Oral every 6 hours PRN Severe Pain (7 - 10)  acetaminophen   Tablet. 325milliGRAM(s) Oral every 4 hours PRN Moderate Pain (4 - 6)      Allergies    No Known Allergies    Intolerances        Flatus: [ ] YES [ ] NO             Bowel Movement: [ ] YES [ ] NO  Pain (0-10):            Pain Control Adequate: [ ] YES [ ] NO  Nausea: [ ] YES [ ] NO            Vomiting: [ ] YES [ ] NO  Diarrhea: [ ] YES [ ] NO         Constipation: [ ] YES [ ] NO     Chest Pain: [ ] YES [ ] NO    SOB:  [ ] YES [ ] NO    Vital Signs Last 24 Hrs  T(C): 37.2, Max: 37.2 (06-19 @ 21:12)  T(F): 99, Max: 99 (06-19 @ 21:12)  HR: 68 (68 - 81)  BP: 116/37 (116/37 - 159/40)  BP(mean): --  RR: 18 (18 - 18)  SpO2: 95% (95% - 97%)    I&O's Summary  I & Os for 24h ending 19 Jun 2017 07:00  =============================================  IN: 0 ml / OUT: 300 ml / NET: -300 ml    I & Os for current day (as of 19 Jun 2017 21:21)  =============================================  IN: 360 ml / OUT: 500 ml / NET: -140 ml      Physical Exam:  General: NAD, resting comfortably  Pulmonary: normal resp effort, CTA-B  Cardiovascular: NSR  Abdominal: soft, NT/ND  Extremities: WWP, normal strength  Neuro: A/O x 3, CNs II-XII grossly intact, normal motor/sensation, no focal deficits  Pulses:   Right:                                                                          Left:  FEM [ ]2+ [ ]1+ [ ]doppler                                             FEM [ ]2+ [ ]1+ [ ]doppler    POP [ ]2+ [ ]1+ [ ]doppler                                             POP [ ]2+ [ ]1+ [ ]doppler    DP [ ]2+ [ ]1+ [ ]doppler                                                DP [ ]2+ [ ]1+ [ ]doppler  PT[ ]2+ [ ]1+ [ ]doppler                                                  PT [ ]2+ [ ]1+ [ ]doppler    LABS:                        7.7    x     )-----------( x        ( 19 Jun 2017 20:06 )             24.3     06-19    151<H>  |  116<H>  |  40<H>  ----------------------------<  97  3.6   |  27  |  1.38<H>    Ca    8.1<L>      19 Jun 2017 05:00            CAPILLARY BLOOD GLUCOSE      RADIOLOGY & ADDITIONAL TESTS:

## 2017-06-19 NOTE — PROGRESS NOTE ADULT - PROBLEM SELECTOR PLAN 2
-f/u BC x 2  -inguinal wound culture shows mixed GNR, EN spp and corynebacterium spp  -on vancomycin 500 mg IV q12h and zosyn 3.375 gm IV q8h # 2  - Wound care as per Vascular Surgery  - Patient may been Muscle flap of right groin ulcer

## 2017-06-19 NOTE — PROGRESS NOTE ADULT - ASSESSMENT
83 y/o male with h/o CAD s/p stents (LAD, RCA bare metal around 9/16), PVD (RLE stent/ angioplasty) complicated with poorly healing right inguinal wound s/p prior surgical debridment (by Dr Siu 5/20 ) Brianna not on anticoagulation due to GI bleeding, Hypertension, COPD on home O2 2L, SHAYY on nocturnal BIPAP, Chronic diastolic CHF, Hyperlipidemia, PVD, Iron deficiency anemia, Chronic back pain, Gout, BPH, CKD III with baseline Cr 2, recent 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 was admitted on 6/16 for worsening anemia with Hb 6, worsening leg pain from ulcers and worsening renal function. He has gradual onset  progressive fatigue over the last couple of weeks. Patient is unable to ambulate because of his leg ulcers. In ER, he was started on vancomycin IV and zosyn.    1. Febrile syndrome resolving. Right inguinal wound probable infection. Right heel ulcer with underlying OM.  -slightly better  -f/u BC x 2  -inguinal wound culture shows mixed GNR, EN spp and corynebacterium spp  -on vancomycin 500 mg IV q12h and zosyn 3.375 gm IV q8h # 2  -tolerating abx well so far; no side effects noted  -vancomycin trough level is therapeutic  -for MRI foot results noted  -local wound care per surgery  -continue abx coverage  -monitor temps  -f/u CBC  -supportive care  2. Other issues: CAD s/p stents, PVD, BHUPENDRADesmondPaul not on anticoagulation due to GI bleeding, Hypertension, COPD, SHAYY on nocturnal BIPAP, Chronic diastolic CHF, Hyperlipidemia, PVD, Iron deficiency anemia, Chronic back pain, Gout, BPH, CKD III   -care per medicine

## 2017-06-19 NOTE — PROGRESS NOTE ADULT - ASSESSMENT
Anemia–likely due to anemia of chronic disease and contributing factor possibly from GI source as well.  Would follow serial H&H's.  His hematocrit appears to be stable in the hospital. An extensive discussion with the patient and his daughter. For now, we will hold off on endoscopy as he is at high risk. However, if there is plan for vascular intervention and increasing antiplatelet agents, we may want across the bridge of endoscopic evaluation again.    Would clinically follow and ascertain for evidence of significant bleeding. Aspirin is on hold at this time.    Fevers–possibly from 1 infection. Discussed with vascular surgery there are also be assessing for osteomyelitis as well as dealing with the groin area that may need a muscle flap as well.    This was discussed with patient and daughter as well as vascular surgery.  Would continue proton pump inhibitor twice a day for now.  He is off of his iron, and may want to reconsider starting it over the next couple of days.    Furthermore, had 2 loose bowel movements if this continues, would assess for C. difficile. This was discussed with nursing

## 2017-06-19 NOTE — PROGRESS NOTE ADULT - SUBJECTIVE AND OBJECTIVE BOX
Patient is a 82y old  Male who presents with a chief complaint of [facility] hemoglobin 7.7 (2017 00:37)    HPI:  83 y/o male with h/o CAD s/p stents (LAD, RCA bare metal around ), PVD (RLE stent/ angioplasty) complicated with poorly healing right inguinal wound s/p prior surgical debridment (by Dr Siu  ) A.Fib not on anticoagulation due to GI bleeding, Hypertension, COPD on home O2 2L, SHAYY on nocturnal BIPAP, Chronic diastolic CHF, Hyperlipidemia, PVD, Iron deficiency anemia, Chronic back pain, Gout, BPH, CKD III with baseline Cr 2, 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 He has gradual onset  progressive fatigue over the last couple of weeks. Patient is unable to ambulate because of his leg ulcers. In ER, he was started on vancomycin IV and zosyn.    Lying in bed in NAD  Weak looking  No further fever or chills    MEDICATIONS  (STANDING):  buDESOnide   0.5 milliGRAM(s) Respule 0.5milliGRAM(s) Inhalation two times a day  doxazosin 8milliGRAM(s) Oral at bedtime  isosorbide   mononitrate ER Tablet (IMDUR) 120milliGRAM(s) Oral daily  diltiazem    Tablet 120milliGRAM(s) Oral every 12 hours  allopurinol 100milliGRAM(s) Oral daily  fenofibrate Tablet 145milliGRAM(s) Oral daily  simvastatin 10milliGRAM(s) Oral at bedtime  pramipexole 0.125milliGRAM(s) Oral three times a day  multivitamin Oral Tab/Cap - Peds 1Tablet(s) Oral daily  gabapentin 300milliGRAM(s) Oral at bedtime  metoprolol 12.5milliGRAM(s) Oral two times a day  senna 2Tablet(s) Oral at bedtime  pantoprazole  Injectable 40milliGRAM(s) IV Push every 12 hours  piperacillin/tazobactam IVPB. 3.375Gram(s) IV Intermittent every 8 hours  hydrALAZINE 150milliGRAM(s) Oral every 12 hours  vancomycin  IVPB 500milliGRAM(s) IV Intermittent every 12 hours  heparin  Injectable 5000Unit(s) SubCutaneous every 8 hours  sodium chloride 0.45%. 1000milliLiter(s) IV Continuous <Continuous>    MEDICATIONS  (PRN):  nitroglycerin     SubLingual 0.4milliGRAM(s) SubLingual three times a day PRN Chest Pain  ondansetron Injectable 4milliGRAM(s) IV Push every 6 hours PRN Nausea  ALBUTerol    90 MICROgram(s) HFA Inhaler 2Puff(s) Inhalation every 6 hours PRN Shortness of Breath and/or Wheezing  HYDROmorphone   Tablet 2milliGRAM(s) Oral every 6 hours PRN Severe Pain (7 - 10)  acetaminophen   Tablet. 325milliGRAM(s) Oral every 4 hours PRN Moderate Pain (4 - 6)      Vital Signs Last 24 Hrs  T(C): 36.7, Max: 37.1 ( @ 04:54)  T(F): 98.1, Max: 98.7 ( @ 04:54)  HR: 68 (68 - 81)  BP: 152/39 (137/55 - 159/40)  BP(mean): --  RR: 18 (18 - 18)  SpO2: 96% (96% - 98%)    Physical Exam:    Constitutional: frail looking  HEENT: NC/AT, EOMI, PERRLA  Neck: supple  Back: no tenderness  Respiratory: decreased BS at bases  Cardiovascular: S1S2 regular, no murmurs  Abdomen: soft, not tender, not distended, positive BS  Genitourinary: right inguinal open wound  Rectal: deferred  Musculoskeletal: no muscle tenderness, no joint swelling or tenderness  Extremities: 1+ pedal edema; right inguinal open wound - deep; scant discharge; right heel ulcer  Neurological: AxOx3, moving all extremities, no focal deficits  Skin: no rashes    Labs:                        8.1    7.0   )-----------( 331      ( 2017 05:00 )             25.2     06-    151<H>  |  116<H>  |  40<H>  ----------------------------<  97  3.6   |  27  |  1.38<H>    Ca    8.1<L>      2017 05:00         Vancomycin Level, Trough: 14.2 ug/mL ( @ 05:00)                          8.2    8.5   )-----------( 312      ( 2017 03:54 )             25.0         148<H>  |  114<H>  |  53<H>  ----------------------------<  129<H>  3.8   |  28  |  1.65<H>    Ca    8.2<L>      2017 03:54  Phos  1.9       Mg     2.1         TPro  5.2<L>  /  Alb  1.7<L>  /  TBili  0.3  /  DBili  x   /  AST  20  /  ALT  12  /  AlkPhos  57  17     LIVER FUNCTIONS - ( 2017 03:54 )  Alb: 1.7 g/dL / Pro: 5.2 gm/dL / ALK PHOS: 57 U/L / ALT: 12 U/L / AST: 20 U/L / GGT: x           Urinalysis Basic - ( 2017 10:36 )    Color: Yellow / Appearance: Clear / S.010 / pH: x  Gluc: x / Ketone: Negative  / Bili: Negative / Urobili: Negative mg/dL   Blood: x / Protein: 15 mg/dL / Nitrite: Negative   Leuk Esterase: Trace / RBC: x / WBC 0-2   Sq Epi: x / Non Sq Epi: x / Bacteria: x    Culture - Blood (17 @ 03:54)    Specimen Source: .Blood Blood    Culture Results:   No growth to date.    Culture - Other (17 @ 12:50)    Specimen Source: .Other right groin wound    Culture Results:   Moderate Mixed gram negative rods  Few Enterococcus species          Radiology:    Right foot MRI:  1.  Large skin ulcer at the posterior aspect of the heel.  2.  Fluid tracking from the skin ulcer towards the medial calcaneus   suspicious for small abscess.  3.  Osteomyelitis of the posterior calcaneus.  4.  Osteonecrosis within the posterior calcaneus.  5.  Partial tearing of the lateral Achilles insertion.  6.  Full-thickness tearing of the lateral cord of the plantar fascia with   partial tear of the central cord.    Advanced directives addressed: full resuscitation

## 2017-06-19 NOTE — PROGRESS NOTE ADULT - SUBJECTIVE AND OBJECTIVE BOX
Chief Complaint/Reason for Admission: Sent from [facility] rehab because of low hemoglobin 7.7	  History of Present Illness: 	  82 year old male with history of CAD s/p stents (LAD, RCA bare metal around 9/16),PVD (RLE stent/ angioplasty and surgical debridment by Dr Siu 5/20 ) Brianna not on anticoagulation due to GI bleeding, Hypertension, COPD on home O2 2L, SHAYY on nocturnal BIPAP, ex-smoker (smoked 1ppd X 50 years, quit 22 years ago),  Chronic diastolic CHF, Hyperlipidemia, PVD, Iron deficiency anemia, Chronic back pain, Gout, BPH, CKD III with baseline Cr 2. Was ecently admitted to  on  4/17 with anemia of GI bleeding, RLE and RUE DVTs,( received pRBCs and IVC filter discharged to rehab with aspirin) was readmitted to   ER on 5/4/17 for worsening anemia with Hb 6, worsening leg pain from ulcers and worsening renal function Cr 3.99 now presents from [facility] with anemia (7.7 on labs today decreasing from 9.5 over past few weeks). Pt c/o gradual onset  progressive fatigue over the last couple of weeks. Patient is unable to ambulate because of his leg cellulitis and ulcers. He denies any shortness of breath, palpitations / chest pain / light headedness. According to the daughter the attendants at [facility] did notice dark stools for the last couple of days. Patient denies any abdominal pain or change in bowel or urinary habits.  In ED, + guaiac.     6/19 - Patient seen and examined. Reports 6 loose BM since this morning.  Patient has been placed on isolation until Cdiff has been rulled out.  Afebrile. hemodynamically stable.     MEDICATIONS  (STANDING):  buDESOnide   0.5 milliGRAM(s) Respule 0.5milliGRAM(s) Inhalation two times a day  doxazosin 8milliGRAM(s) Oral at bedtime  isosorbide   mononitrate ER Tablet (IMDUR) 120milliGRAM(s) Oral daily  diltiazem    Tablet 120milliGRAM(s) Oral every 12 hours  allopurinol 100milliGRAM(s) Oral daily  fenofibrate Tablet 145milliGRAM(s) Oral daily  simvastatin 10milliGRAM(s) Oral at bedtime  pramipexole 0.125milliGRAM(s) Oral three times a day  multivitamin Oral Tab/Cap - Peds 1Tablet(s) Oral daily  gabapentin 300milliGRAM(s) Oral at bedtime  metoprolol 12.5milliGRAM(s) Oral two times a day  senna 2Tablet(s) Oral at bedtime  pantoprazole  Injectable 40milliGRAM(s) IV Push every 12 hours  piperacillin/tazobactam IVPB. 3.375Gram(s) IV Intermittent every 8 hours  hydrALAZINE 150milliGRAM(s) Oral every 12 hours  vancomycin  IVPB 500milliGRAM(s) IV Intermittent every 12 hours  heparin  Injectable 5000Unit(s) SubCutaneous every 8 hours  dextrose 5%. 1000milliLiter(s) IV Continuous <Continuous>    MEDICATIONS  (PRN):  nitroglycerin     SubLingual 0.4milliGRAM(s) SubLingual three times a day PRN Chest Pain  ondansetron Injectable 4milliGRAM(s) IV Push every 6 hours PRN Nausea  ALBUTerol    90 MICROgram(s) HFA Inhaler 2Puff(s) Inhalation every 6 hours PRN Shortness of Breath and/or Wheezing  HYDROmorphone   Tablet 2milliGRAM(s) Oral every 6 hours PRN Severe Pain (7 - 10)  acetaminophen   Tablet. 325milliGRAM(s) Oral every 4 hours PRN Moderate Pain (4 - 6)    ICU Vital Signs Last 24 Hrs  T(C): 36.7, Max: 37.1 (06-19 @ 04:54)  T(F): 98.1, Max: 98.7 (06-19 @ 04:54)  HR: 68 (68 - 81)  BP: 152/39 (137/55 - 159/40)  BP(mean): --  ABP: --  ABP(mean): --  RR: 18 (18 - 18)  SpO2: 96% (96% - 98%)    GEN: NAD, comfortable, resting in bed  CV: S1S2, RRR, no mumur  RESP: good air movement, CTABL, no rales, rhonchi or wheezing  ABD: +BS, soft, ND, NT, no guarding, no rigidity  Skin: R groin incision open with fibrinous debris at base, patch not exposed; distal leg wounds granulating with scattered areas of eschar, tendon on R lateral leg exposed and necrotic; foot intact; L leg with stasis changes but no cellulitis or ulcers  EXT: +2 radial and pedial pulses, no edema, no calve tenderness                        8.1    7.0   )-----------( 331      ( 19 Jun 2017 05:00 )             25.2     19 Jun 2017 05:00    151    |  116    |  40     ----------------------------<  97     3.6     |  27     |  1.38     Ca    8.1        19 Jun 2017 05:00    CAPILLARY BLOOD GLUCOSE

## 2017-06-19 NOTE — PROGRESS NOTE ADULT - SUBJECTIVE AND OBJECTIVE BOX
Patient is a 82y old  Male who presents with a chief complaint of Sent from [facility] rehab because of low hemoglobin 7.7 (17 Jun 2017 00:37)      HPI:  82 year old male with history of CAD s/p stents (LAD, RCA bare metal around 9/16),PVD (RLE stent/ angioplasty and surgical debridment by Dr Siu 5/20 ) A.Fib not on anticoagulation due to GI bleeding, Hypertension, COPD on home O2 2L, SHAYY on nocturnal BIPAP, ex-smoker (smoked 1ppd X 50 years, quit 22 years ago),  Chronic diastolic CHF, Hyperlipidemia, PVD, Iron deficiency anemia, Chronic back pain, Gout, BPH, CKD III with baseline Cr 2. Was ecently admitted to  on  4/17 with anemia of GI bleeding, RLE and RUE DVTs,( received pRBCs and IVC filter discharged to rehab with aspirin) was readmitted to   ER on 5/4/17 for worsening anemia with Hb 6, worsening leg pain from ulcers and worsening renal function Cr 3.99 now presents from [facility] with anemia (7.7 on labs today decreasing from 9.5 over past few weeks). Pt c/o gradual onset  progressive fatigue over the last couple of weeks. Patient is unable to ambulate because of his leg cellulitis and ulcers. He denies any shortness of breath, palpitations / chest pain / light headedness. According to the daughter the attendants at [facility] did notice dark stools for the last couple of days. Patient denies any abdominal pain or change in bowel or urinary habits.  In ED, + guaiac. (17 Jun 2017 00:37)    Patient seen with daughter Anamika. Patient is comfortable. Patient denies any abdominal pain.  Patient had been at rehabilitation facility and had dropping hemoglobins to 7.7 and 19 to the hospital. However, on arrival here, hemoglobin was in the eights. He has not received any transfusions. I reviewed this with the RN. He was guaiac-positive stools and black stools however, he had been on iron tablets.  Iron has been stopped and he's had a couple of loose bowel movements overnight which have been brown per nurse.  He denies any nausea vomiting fevers chills diaphoresis arriving use.  I also discussed the case with his vascular surgeon. They are planning a possible muscle flap over the groin area that has a leakage    Patient is been tolerating by mouth well. He denies any fevers or chills.        PAST MEDICAL & SURGICAL HISTORY:  Sepsis, due to unspecified organism: 2/2 poorly healing wounds b/l  BPH (benign prostatic hypertrophy)  Hyperlipemia  Coronary artery disease  Hypertension  Dyspepsia: On moderate exertion.  Sleep apnea, obstructive: Requires home 02 therapy, and treatment with BIPAP  Atelectasis  Pleural effusion, bilateral  Respiratory failure  Peripheral edema  CRI (chronic renal insufficiency)  Gout  CRF (chronic renal failure)  Benign prostatic hypertrophy  Spinal stenosis  Hypercholesterolemia  GERD (gastroesophageal reflux disease)  CAD (coronary artery disease)  Hypertension  S/P angioplasty with stent  Cataract of left eye  Prostate: Surgery green light procedure.  S/P rotator cuff surgery: Right  S/P angioplasty  Rotator cuff tear, right: repair      MEDICATIONS  (STANDING):  buDESOnide   0.5 milliGRAM(s) Respule 0.5milliGRAM(s) Inhalation two times a day  doxazosin 8milliGRAM(s) Oral at bedtime  isosorbide   mononitrate ER Tablet (IMDUR) 120milliGRAM(s) Oral daily  diltiazem    Tablet 120milliGRAM(s) Oral every 12 hours  allopurinol 100milliGRAM(s) Oral daily  fenofibrate Tablet 145milliGRAM(s) Oral daily  simvastatin 10milliGRAM(s) Oral at bedtime  pramipexole 0.125milliGRAM(s) Oral three times a day  multivitamin Oral Tab/Cap - Peds 1Tablet(s) Oral daily  gabapentin 300milliGRAM(s) Oral at bedtime  metoprolol 12.5milliGRAM(s) Oral two times a day  senna 2Tablet(s) Oral at bedtime  pantoprazole  Injectable 40milliGRAM(s) IV Push every 12 hours  piperacillin/tazobactam IVPB. 3.375Gram(s) IV Intermittent every 8 hours  hydrALAZINE 150milliGRAM(s) Oral every 12 hours  vancomycin  IVPB 500milliGRAM(s) IV Intermittent every 12 hours  heparin  Injectable 5000Unit(s) SubCutaneous every 8 hours    MEDICATIONS  (PRN):  nitroglycerin     SubLingual 0.4milliGRAM(s) SubLingual three times a day PRN Chest Pain  ondansetron Injectable 4milliGRAM(s) IV Push every 6 hours PRN Nausea  ALBUTerol    90 MICROgram(s) HFA Inhaler 2Puff(s) Inhalation every 6 hours PRN Shortness of Breath and/or Wheezing  HYDROmorphone   Tablet 2milliGRAM(s) Oral every 6 hours PRN Severe Pain (7 - 10)  acetaminophen   Tablet. 325milliGRAM(s) Oral every 4 hours PRN Moderate Pain (4 - 6)      Allergies    No Known Allergies    Intolerances        SOCIAL HISTORY: From nursing home    FAMILY HISTORY:  No pertinent family history in first degree relatives      REVIEW OF SYSTEMS:    CONSTITUTIONAL: No weakness, fevers or chills  EYES/ENT: No visual changes;  No vertigo or throat pain   NECK: No pain or stiffness  RESPIRATORY: No cough, wheezing, hemoptysis; No shortness of breath  CARDIOVASCULAR: No chest pain or palpitations  GENITOURINARY: No dysuria, frequency or hematuria  NEUROLOGICAL: No numbness or weakness  SKIN: No itching, burning, rashes, or lesions   All other review of systems is negative unless indicated above.    Vital Signs Last 24 Hrs  T(C): 37.1, Max: 37.1 (06-19 @ 04:54)  T(F): 98.7, Max: 98.7 (06-19 @ 04:54)  HR: 80 (72 - 81)  BP: 159/40 (137/55 - 159/40)  BP(mean): --  RR: 18 (18 - 18)  SpO2: 98% (98% - 98%)    PHYSICAL EXAM:    Constitutional: NAD, well-developed  HEENT: EOMI, throat clear  Neck: No LAD, supple  Respiratory: CTA and P  Cardiovascular: S1 and S2, RRR, no M  Gastrointestinal: BS+, soft, NT/ND, neg HSM,  Extremities: No peripheral edema,dressing on LE, dressing on R groin    Neurological: A/O x 3, no focal deficits  Psychiatric: Normal mood, normal affect  Skin: No rashes    LABS:  CBC Full  -  ( 19 Jun 2017 05:00 )  WBC Count : 7.0 K/uL  Hemoglobin : 8.1 g/dL  Hematocrit : 25.2 %  Platelet Count - Automated : 331 K/uL  Mean Cell Volume : 88.4 fl  Mean Cell Hemoglobin : 28.3 pg  Mean Cell Hemoglobin Concentration : 32.0 gm/dL  Auto Neutrophil # : x  Auto Lymphocyte # : x  Auto Monocyte # : x  Auto Eosinophil # : x  Auto Basophil # : x  Auto Neutrophil % : x  Auto Lymphocyte % : x  Auto Monocyte % : x  Auto Eosinophil % : x  Auto Basophil % : x    06-19    151<H>  |  116<H>  |  40<H>  ----------------------------<  97  3.6   |  27  |  1.38<H>    Ca    8.1<L>      19 Jun 2017 05:00              RADIOLOGY & ADDITIONAL STUDIES:  EXAM:  CHEST SINGLE VIEW FRONTAL                            PROCEDURE DATE:  06/16/2017        INTERPRETATION:  CHEST SINGLE VIEW FRONTAL    Single AP view    HISTORY:  Anemia    Comparison:  Chest x-ray 5/18/2017    The cardiac silhouette is within normal limits. The lungs are clear. No   pleural abnormality.    IMPRESSION: No acute disease.                    KENDRA ERAZO   This document has been electronically signed. Jun 17 2017 10:18AM

## 2017-06-20 ENCOUNTER — RESULT REVIEW (OUTPATIENT)
Age: 82
End: 2017-06-20

## 2017-06-20 LAB
ANION GAP SERPL CALC-SCNC: 6 MMOL/L — SIGNIFICANT CHANGE UP (ref 5–17)
BUN SERPL-MCNC: 32 MG/DL — HIGH (ref 7–23)
CALCIUM SERPL-MCNC: 7.9 MG/DL — LOW (ref 8.5–10.1)
CHLORIDE SERPL-SCNC: 113 MMOL/L — HIGH (ref 96–108)
CO2 SERPL-SCNC: 27 MMOL/L — SIGNIFICANT CHANGE UP (ref 22–31)
CREAT SERPL-MCNC: 1.33 MG/DL — HIGH (ref 0.5–1.3)
GLUCOSE SERPL-MCNC: 118 MG/DL — HIGH (ref 70–99)
HCT VFR BLD CALC: 25.6 % — LOW (ref 39–50)
HCT VFR BLD CALC: 27 % — LOW (ref 39–50)
HGB BLD-MCNC: 8.3 G/DL — LOW (ref 13–17)
HGB BLD-MCNC: 8.4 G/DL — LOW (ref 13–17)
MCHC RBC-ENTMCNC: 28.5 PG — SIGNIFICANT CHANGE UP (ref 27–34)
MCHC RBC-ENTMCNC: 32.4 GM/DL — SIGNIFICANT CHANGE UP (ref 32–36)
MCV RBC AUTO: 87.9 FL — SIGNIFICANT CHANGE UP (ref 80–100)
PLATELET # BLD AUTO: 367 K/UL — SIGNIFICANT CHANGE UP (ref 150–400)
POTASSIUM SERPL-MCNC: 3.2 MMOL/L — LOW (ref 3.5–5.3)
POTASSIUM SERPL-SCNC: 3.2 MMOL/L — LOW (ref 3.5–5.3)
RBC # BLD: 2.91 M/UL — LOW (ref 4.2–5.8)
RBC # FLD: 15.7 % — HIGH (ref 10.3–14.5)
SODIUM SERPL-SCNC: 146 MMOL/L — HIGH (ref 135–145)
WBC # BLD: 6.5 K/UL — SIGNIFICANT CHANGE UP (ref 3.8–10.5)
WBC # FLD AUTO: 6.5 K/UL — SIGNIFICANT CHANGE UP (ref 3.8–10.5)

## 2017-06-20 PROCEDURE — 88304 TISSUE EXAM BY PATHOLOGIST: CPT | Mod: 26

## 2017-06-20 RX ORDER — SODIUM CHLORIDE 9 MG/ML
1000 INJECTION INTRAMUSCULAR; INTRAVENOUS; SUBCUTANEOUS
Qty: 0 | Refills: 0 | Status: DISCONTINUED | OUTPATIENT
Start: 2017-06-20 | End: 2017-06-20

## 2017-06-20 RX ORDER — VANCOMYCIN HCL 1 G
500 VIAL (EA) INTRAVENOUS EVERY 12 HOURS
Qty: 0 | Refills: 0 | Status: DISCONTINUED | OUTPATIENT
Start: 2017-06-20 | End: 2017-06-27

## 2017-06-20 RX ORDER — PIPERACILLIN AND TAZOBACTAM 4; .5 G/20ML; G/20ML
3.38 INJECTION, POWDER, LYOPHILIZED, FOR SOLUTION INTRAVENOUS EVERY 8 HOURS
Qty: 0 | Refills: 0 | Status: DISCONTINUED | OUTPATIENT
Start: 2017-06-20 | End: 2017-06-27

## 2017-06-20 RX ORDER — DILTIAZEM HCL 120 MG
120 CAPSULE, EXT RELEASE 24 HR ORAL DAILY
Qty: 0 | Refills: 0 | Status: DISCONTINUED | OUTPATIENT
Start: 2017-06-20 | End: 2017-06-27

## 2017-06-20 RX ORDER — DOXAZOSIN MESYLATE 4 MG
8 TABLET ORAL AT BEDTIME
Qty: 0 | Refills: 0 | Status: DISCONTINUED | OUTPATIENT
Start: 2017-06-20 | End: 2017-06-27

## 2017-06-20 RX ORDER — FERROUS SULFATE 325(65) MG
325 TABLET ORAL
Qty: 0 | Refills: 0 | Status: DISCONTINUED | OUTPATIENT
Start: 2017-06-20 | End: 2017-06-27

## 2017-06-20 RX ORDER — HYDROMORPHONE HYDROCHLORIDE 2 MG/ML
2 INJECTION INTRAMUSCULAR; INTRAVENOUS; SUBCUTANEOUS EVERY 6 HOURS
Qty: 0 | Refills: 0 | Status: DISCONTINUED | OUTPATIENT
Start: 2017-06-20 | End: 2017-06-27

## 2017-06-20 RX ORDER — METOPROLOL TARTRATE 50 MG
25 TABLET ORAL DAILY
Qty: 0 | Refills: 0 | Status: DISCONTINUED | OUTPATIENT
Start: 2017-06-20 | End: 2017-06-24

## 2017-06-20 RX ORDER — FENTANYL CITRATE 50 UG/ML
50 INJECTION INTRAVENOUS
Qty: 0 | Refills: 0 | Status: DISCONTINUED | OUTPATIENT
Start: 2017-06-20 | End: 2017-06-20

## 2017-06-20 RX ORDER — SIMVASTATIN 20 MG/1
10 TABLET, FILM COATED ORAL AT BEDTIME
Qty: 0 | Refills: 0 | Status: DISCONTINUED | OUTPATIENT
Start: 2017-06-20 | End: 2017-06-27

## 2017-06-20 RX ORDER — SENNA PLUS 8.6 MG/1
2 TABLET ORAL AT BEDTIME
Qty: 0 | Refills: 0 | Status: DISCONTINUED | OUTPATIENT
Start: 2017-06-20 | End: 2017-06-27

## 2017-06-20 RX ORDER — PRAMIPEXOLE DIHYDROCHLORIDE 0.12 MG/1
0.12 TABLET ORAL DAILY
Qty: 0 | Refills: 0 | Status: DISCONTINUED | OUTPATIENT
Start: 2017-06-20 | End: 2017-06-27

## 2017-06-20 RX ORDER — BUDESONIDE, MICRONIZED 100 %
0.5 POWDER (GRAM) MISCELLANEOUS DAILY
Qty: 0 | Refills: 0 | Status: DISCONTINUED | OUTPATIENT
Start: 2017-06-20 | End: 2017-06-24

## 2017-06-20 RX ORDER — ALBUTEROL 90 UG/1
2 AEROSOL, METERED ORAL EVERY 6 HOURS
Qty: 0 | Refills: 0 | Status: DISCONTINUED | OUTPATIENT
Start: 2017-06-20 | End: 2017-06-27

## 2017-06-20 RX ORDER — FERROUS SULFATE 325(65) MG
325 TABLET ORAL
Qty: 0 | Refills: 0 | Status: DISCONTINUED | OUTPATIENT
Start: 2017-06-20 | End: 2017-06-20

## 2017-06-20 RX ORDER — ACETAMINOPHEN 500 MG
500 TABLET ORAL EVERY 6 HOURS
Qty: 0 | Refills: 0 | Status: DISCONTINUED | OUTPATIENT
Start: 2017-06-20 | End: 2017-06-27

## 2017-06-20 RX ORDER — PANTOPRAZOLE SODIUM 20 MG/1
40 TABLET, DELAYED RELEASE ORAL EVERY 12 HOURS
Qty: 0 | Refills: 0 | Status: DISCONTINUED | OUTPATIENT
Start: 2017-06-20 | End: 2017-06-27

## 2017-06-20 RX ORDER — MEPERIDINE HYDROCHLORIDE 50 MG/ML
12.5 INJECTION INTRAMUSCULAR; INTRAVENOUS; SUBCUTANEOUS
Qty: 0 | Refills: 0 | Status: DISCONTINUED | OUTPATIENT
Start: 2017-06-20 | End: 2017-06-20

## 2017-06-20 RX ORDER — ONDANSETRON 8 MG/1
4 TABLET, FILM COATED ORAL ONCE
Qty: 0 | Refills: 0 | Status: DISCONTINUED | OUTPATIENT
Start: 2017-06-20 | End: 2017-06-20

## 2017-06-20 RX ORDER — HYDRALAZINE HCL 50 MG
50 TABLET ORAL DAILY
Qty: 0 | Refills: 0 | Status: DISCONTINUED | OUTPATIENT
Start: 2017-06-20 | End: 2017-06-27

## 2017-06-20 RX ORDER — OXYCODONE HYDROCHLORIDE 5 MG/1
5 TABLET ORAL EVERY 4 HOURS
Qty: 0 | Refills: 0 | Status: DISCONTINUED | OUTPATIENT
Start: 2017-06-20 | End: 2017-06-20

## 2017-06-20 RX ORDER — POTASSIUM CHLORIDE 20 MEQ
10 PACKET (EA) ORAL
Qty: 0 | Refills: 0 | Status: COMPLETED | OUTPATIENT
Start: 2017-06-20 | End: 2017-06-20

## 2017-06-20 RX ORDER — NITROGLYCERIN 6.5 MG
0.3 CAPSULE, EXTENDED RELEASE ORAL
Qty: 0 | Refills: 0 | Status: DISCONTINUED | OUTPATIENT
Start: 2017-06-20 | End: 2017-06-20

## 2017-06-20 RX ORDER — HEPARIN SODIUM 5000 [USP'U]/ML
5000 INJECTION INTRAVENOUS; SUBCUTANEOUS EVERY 8 HOURS
Qty: 0 | Refills: 0 | Status: DISCONTINUED | OUTPATIENT
Start: 2017-06-21 | End: 2017-06-27

## 2017-06-20 RX ORDER — ALLOPURINOL 300 MG
100 TABLET ORAL DAILY
Qty: 0 | Refills: 0 | Status: DISCONTINUED | OUTPATIENT
Start: 2017-06-20 | End: 2017-06-27

## 2017-06-20 RX ORDER — GABAPENTIN 400 MG/1
300 CAPSULE ORAL DAILY
Qty: 0 | Refills: 0 | Status: DISCONTINUED | OUTPATIENT
Start: 2017-06-20 | End: 2017-06-27

## 2017-06-20 RX ORDER — FENOFIBRATE,MICRONIZED 130 MG
145 CAPSULE ORAL DAILY
Qty: 0 | Refills: 0 | Status: DISCONTINUED | OUTPATIENT
Start: 2017-06-20 | End: 2017-06-27

## 2017-06-20 RX ORDER — ONDANSETRON 8 MG/1
4 TABLET, FILM COATED ORAL ONCE
Qty: 0 | Refills: 0 | Status: COMPLETED | OUTPATIENT
Start: 2017-06-20 | End: 2017-06-20

## 2017-06-20 RX ORDER — SODIUM CHLORIDE 9 MG/ML
1000 INJECTION, SOLUTION INTRAVENOUS
Qty: 0 | Refills: 0 | Status: DISCONTINUED | OUTPATIENT
Start: 2017-06-20 | End: 2017-06-22

## 2017-06-20 RX ORDER — NITROGLYCERIN 6.5 MG
0.4 CAPSULE, EXTENDED RELEASE ORAL
Qty: 0 | Refills: 0 | Status: DISCONTINUED | OUTPATIENT
Start: 2017-06-20 | End: 2017-06-27

## 2017-06-20 RX ORDER — ISOSORBIDE MONONITRATE 60 MG/1
120 TABLET, EXTENDED RELEASE ORAL DAILY
Qty: 0 | Refills: 0 | Status: DISCONTINUED | OUTPATIENT
Start: 2017-06-20 | End: 2017-06-27

## 2017-06-20 RX ADMIN — Medication 100 MILLIGRAM(S): at 05:11

## 2017-06-20 RX ADMIN — PRAMIPEXOLE DIHYDROCHLORIDE 0.12 MILLIGRAM(S): 0.12 TABLET ORAL at 06:32

## 2017-06-20 RX ADMIN — Medication 145 MILLIGRAM(S): at 19:57

## 2017-06-20 RX ADMIN — Medication 50 MILLIGRAM(S): at 22:34

## 2017-06-20 RX ADMIN — HYDROMORPHONE HYDROCHLORIDE 2 MILLIGRAM(S): 2 INJECTION INTRAMUSCULAR; INTRAVENOUS; SUBCUTANEOUS at 13:49

## 2017-06-20 RX ADMIN — SENNA PLUS 2 TABLET(S): 8.6 TABLET ORAL at 20:48

## 2017-06-20 RX ADMIN — Medication 0.5 MILLIGRAM(S): at 07:49

## 2017-06-20 RX ADMIN — Medication 100 MILLIGRAM(S): at 11:59

## 2017-06-20 RX ADMIN — Medication 25 MILLIGRAM(S): at 20:52

## 2017-06-20 RX ADMIN — ISOSORBIDE MONONITRATE 120 MILLIGRAM(S): 60 TABLET, EXTENDED RELEASE ORAL at 11:58

## 2017-06-20 RX ADMIN — Medication 150 MILLIGRAM(S): at 06:31

## 2017-06-20 RX ADMIN — Medication 145 MILLIGRAM(S): at 11:59

## 2017-06-20 RX ADMIN — Medication 1 TABLET(S): at 11:57

## 2017-06-20 RX ADMIN — PIPERACILLIN AND TAZOBACTAM 25 GRAM(S): 4; .5 INJECTION, POWDER, LYOPHILIZED, FOR SOLUTION INTRAVENOUS at 06:33

## 2017-06-20 RX ADMIN — PRAMIPEXOLE DIHYDROCHLORIDE 0.12 MILLIGRAM(S): 0.12 TABLET ORAL at 19:59

## 2017-06-20 RX ADMIN — Medication 100 MILLIEQUIVALENT(S): at 15:33

## 2017-06-20 RX ADMIN — PANTOPRAZOLE SODIUM 40 MILLIGRAM(S): 20 TABLET, DELAYED RELEASE ORAL at 20:48

## 2017-06-20 RX ADMIN — Medication 100 MILLIEQUIVALENT(S): at 13:46

## 2017-06-20 RX ADMIN — Medication 100 MILLIEQUIVALENT(S): at 11:52

## 2017-06-20 RX ADMIN — PIPERACILLIN AND TAZOBACTAM 25 GRAM(S): 4; .5 INJECTION, POWDER, LYOPHILIZED, FOR SOLUTION INTRAVENOUS at 15:33

## 2017-06-20 RX ADMIN — Medication 1 TABLET(S): at 19:59

## 2017-06-20 RX ADMIN — PANTOPRAZOLE SODIUM 40 MILLIGRAM(S): 20 TABLET, DELAYED RELEASE ORAL at 06:31

## 2017-06-20 RX ADMIN — SODIUM CHLORIDE 75 MILLILITER(S): 9 INJECTION INTRAMUSCULAR; INTRAVENOUS; SUBCUTANEOUS at 08:01

## 2017-06-20 RX ADMIN — HYDROMORPHONE HYDROCHLORIDE 2 MILLIGRAM(S): 2 INJECTION INTRAMUSCULAR; INTRAVENOUS; SUBCUTANEOUS at 20:50

## 2017-06-20 RX ADMIN — SODIUM CHLORIDE 75 MILLILITER(S): 9 INJECTION INTRAMUSCULAR; INTRAVENOUS; SUBCUTANEOUS at 18:03

## 2017-06-20 RX ADMIN — Medication 100 MILLIGRAM(S): at 19:57

## 2017-06-20 RX ADMIN — Medication 325 MILLIGRAM(S): at 20:50

## 2017-06-20 RX ADMIN — Medication 8 MILLIGRAM(S): at 22:32

## 2017-06-20 RX ADMIN — SIMVASTATIN 10 MILLIGRAM(S): 20 TABLET, FILM COATED ORAL at 20:49

## 2017-06-20 RX ADMIN — PRAMIPEXOLE DIHYDROCHLORIDE 0.12 MILLIGRAM(S): 0.12 TABLET ORAL at 13:49

## 2017-06-20 RX ADMIN — Medication 12.5 MILLIGRAM(S): at 06:32

## 2017-06-20 RX ADMIN — GABAPENTIN 300 MILLIGRAM(S): 400 CAPSULE ORAL at 20:52

## 2017-06-20 NOTE — PROGRESS NOTE ADULT - SUBJECTIVE AND OBJECTIVE BOX
Patient is a 82y old  Male who presents with a chief complaint of Sent from [facility] rehab because of low hemoglobin 7.7 (17 Jun 2017 00:37)      HPI:  82 year old male with history of CAD s/p stents (LAD, RCA bare metal around 9/16),PVD (RLE stent/ angioplasty and surgical debridment by Dr Siu 5/20 ) A.Fib not on anticoagulation due to GI bleeding, Hypertension, COPD on home O2 2L, SHAYY on nocturnal BIPAP, ex-smoker (smoked 1ppd X 50 years, quit 22 years ago),  Chronic diastolic CHF, Hyperlipidemia, PVD, Iron deficiency anemia, Chronic back pain, Gout, BPH, CKD III with baseline Cr 2. Was ecently admitted to  on  4/17 with anemia of GI bleeding, RLE and RUE DVTs,( received pRBCs and IVC filter discharged to rehab with aspirin) was readmitted to   ER on 5/4/17 for worsening anemia with Hb 6, worsening leg pain from ulcers and worsening renal function Cr 3.99 now presents from [facility] with anemia (7.7 on labs today decreasing from 9.5 over past few weeks). Pt c/o gradual onset  progressive fatigue over the last couple of weeks. Patient is unable to ambulate because of his leg cellulitis and ulcers. He denies any shortness of breath, palpitations / chest pain / light headedness. According to the daughter the attendants at [facility] did notice dark stools for the last couple of days. Patient denies any abdominal pain or change in bowel or urinary habits.  In ED, + guaiac. (17 Jun 2017 00:37)    pt comf  Hx per pt and daughter  diarrhea improved and now recalls diarrhea after he had 12 oysters  neg cp     pos fatigue  BM s blood        PAST MEDICAL & SURGICAL HISTORY:  Sepsis, due to unspecified organism: 2/2 poorly healing wounds b/l  BPH (benign prostatic hypertrophy)  Hyperlipemia  Coronary artery disease  Hypertension  Dyspepsia: On moderate exertion.  Sleep apnea, obstructive: Requires home 02 therapy, and treatment with BIPAP  Atelectasis  Pleural effusion, bilateral  Respiratory failure  Peripheral edema  CRI (chronic renal insufficiency)  Gout  CRF (chronic renal failure)  Benign prostatic hypertrophy  Spinal stenosis  Hypercholesterolemia  GERD (gastroesophageal reflux disease)  CAD (coronary artery disease)  Hypertension  S/P angioplasty with stent  Cataract of left eye  Prostate: Surgery green light procedure.  S/P rotator cuff surgery: Right  S/P angioplasty  Rotator cuff tear, right: repair      MEDICATIONS  (STANDING):  buDESOnide   0.5 milliGRAM(s) Respule 0.5milliGRAM(s) Inhalation two times a day  doxazosin 8milliGRAM(s) Oral at bedtime  isosorbide   mononitrate ER Tablet (IMDUR) 120milliGRAM(s) Oral daily  diltiazem    Tablet 120milliGRAM(s) Oral every 12 hours  allopurinol 100milliGRAM(s) Oral daily  fenofibrate Tablet 145milliGRAM(s) Oral daily  simvastatin 10milliGRAM(s) Oral at bedtime  pramipexole 0.125milliGRAM(s) Oral three times a day  multivitamin Oral Tab/Cap - Peds 1Tablet(s) Oral daily  gabapentin 300milliGRAM(s) Oral at bedtime  metoprolol 12.5milliGRAM(s) Oral two times a day  senna 2Tablet(s) Oral at bedtime  pantoprazole  Injectable 40milliGRAM(s) IV Push every 12 hours  piperacillin/tazobactam IVPB. 3.375Gram(s) IV Intermittent every 8 hours  hydrALAZINE 150milliGRAM(s) Oral every 12 hours  vancomycin  IVPB 500milliGRAM(s) IV Intermittent every 12 hours  heparin  Injectable 5000Unit(s) SubCutaneous every 8 hours  dextrose 5%. 1000milliLiter(s) IV Continuous <Continuous>  sodium chloride 0.9%. 1000milliLiter(s) IV Continuous <Continuous>  potassium chloride  10 mEq/100 mL IVPB 10milliEquivalent(s) IV Intermittent every 1 hour    MEDICATIONS  (PRN):  nitroglycerin     SubLingual 0.4milliGRAM(s) SubLingual three times a day PRN Chest Pain  ondansetron Injectable 4milliGRAM(s) IV Push every 6 hours PRN Nausea  ALBUTerol    90 MICROgram(s) HFA Inhaler 2Puff(s) Inhalation every 6 hours PRN Shortness of Breath and/or Wheezing  HYDROmorphone   Tablet 2milliGRAM(s) Oral every 6 hours PRN Severe Pain (7 - 10)  acetaminophen   Tablet. 325milliGRAM(s) Oral every 4 hours PRN Moderate Pain (4 - 6)      Allergies    No Known Allergies    Intolerances        SOCIAL HISTORY:unchanged    FAMILY HISTORY:  No pertinent family history in first degree relatives      REVIEW OF SYSTEMS:    CONSTITUTIONAL: No weakness, fevers or chills  EYES/ENT: No visual changes;  No vertigo or throat pain   NECK: No pain or stiffness  RESPIRATORY: No cough, wheezing, hemoptysis; No shortness of breath  CARDIOVASCULAR: No chest pain or palpitations  GENITOURINARY: No dysuria, frequency or hematuria  NEUROLOGICAL: No numbness or weakness  SKIN: No itching, burning, rashes, or lesions   All other review of systems is negative unless indicated above.    Vital Signs Last 24 Hrs  T(C): 36.9, Max: 37.2 (06-19 @ 21:12)  T(F): 98.4, Max: 99 (06-19 @ 21:12)  HR: 62 (62 - 82)  BP: 148/46 (116/37 - 157/45)  BP(mean): --  RR: 18 (18 - 18)  SpO2: 97% (93% - 97%)    PHYSICAL EXAM:    Constitutional: NAD, well-developed  HEENT: EOMI, throat clear  Neck: No LAD, supple  Respiratory: CTA and P  Cardiovascular: S1 and S2, RRR, no M  Gastrointestinal: BS+, soft, NT/ND, neg HSM,  Extremities: pos peripheral edema, neg clubing, cyanosis, dressing on leg  Vascular: 2+ peripheral pulses  Neurological: A/O x 3, no focal deficits  Psychiatric: Normal mood, normal affect  Skin: No rashes    LABS:  CBC Full  -  ( 20 Jun 2017 06:25 )  WBC Count : 6.5 K/uL  Hemoglobin : 8.3 g/dL  Hematocrit : 25.6 %  Platelet Count - Automated : 367 K/uL  Mean Cell Volume : 87.9 fl  Mean Cell Hemoglobin : 28.5 pg  Mean Cell Hemoglobin Concentration : 32.4 gm/dL  Auto Neutrophil # : x  Auto Lymphocyte # : x  Auto Monocyte # : x  Auto Eosinophil # : x  Auto Basophil # : x  Auto Neutrophil % : x  Auto Lymphocyte % : x  Auto Monocyte % : x  Auto Eosinophil % : x  Auto Basophil % : x    06-20    146<H>  |  113<H>  |  32<H>  ----------------------------<  118<H>  3.2<L>   |  27  |  1.33<H>    Ca    7.9<L>      20 Jun 2017 06:25              RADIOLOGY & ADDITIONAL STUDIES:

## 2017-06-20 NOTE — PROGRESS NOTE ADULT - PROBLEM SELECTOR PLAN 2
-f/u BC x 2  -inguinal wound culture shows mixed GNR, EN spp and corynebacterium spp  - on vancomycin 500 mg IV q12h and zosyn 3.375 gm IV q8h # 3  - Wound care as per Vascular Surgery  - Muscle flap of right groin ulcer tonight

## 2017-06-20 NOTE — BRIEF OPERATIVE NOTE - PRE-OP DX
Ulcer of groin incision with fat layer exposed  06/20/2017  status post thromboendarterectomy of R CFA with post operative dehiscence  Jason Reese

## 2017-06-20 NOTE — PROGRESS NOTE ADULT - ASSESSMENT
81 y/o male with h/o CAD s/p stents (LAD, RCA bare metal around 9/16), PVD (RLE stent/ angioplasty) complicated with poorly healing right inguinal wound s/p prior surgical debridment (by Dr Siu 5/20 ) BHUPENDRAKarlie not on anticoagulation due to GI bleeding, Hypertension, COPD on home O2 2L, SHAYY on nocturnal BIPAP, Chronic diastolic CHF, Hyperlipidemia, PVD, Iron deficiency anemia, Chronic back pain, Gout, BPH, CKD III with baseline Cr 2, recent 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 was admitted on 6/16 for worsening anemia with Hb 6, worsening leg pain from ulcers and worsening renal function. He has gradual onset  progressive fatigue over the last couple of weeks. Patient is unable to ambulate because of his leg ulcers. In ER, he was started on vancomycin IV and zosyn.    1. Right inguinal open wound with probable infection. Right heel ulcer with underlying OM.  -weak  -f/u BC x 2  -inguinal wound culture shows mixed GNR, EN spp and corynebacterium spp  -on vancomycin 500 mg IV q12h and zosyn 3.375 gm IV q8h # 3  -tolerating abx well so far; no side effects noted  -vancomycin trough level is therapeutic  -for MRI foot results noted  -local wound care per surgery  -continue abx coverage  -monitor temps  -f/u CBC  -supportive care  2. Other issues: CAD s/p stents, PVD, Brianna not on anticoagulation due to GI bleeding, Hypertension, COPD, SHAYY on nocturnal BIPAP, Chronic diastolic CHF, Hyperlipidemia, PVD, Iron deficiency anemia, Chronic back pain, Gout, BPH, CKD III   -care per medicine

## 2017-06-20 NOTE — PROGRESS NOTE ADULT - SUBJECTIVE AND OBJECTIVE BOX
Patient is a 82y old  Male who [facility] a [facility] hemoglobin 7.7 (2017 00:37)    HPI:  81 y/o male with h/o CAD s/p stents (LAD, RCA bare metal around ), PVD (RLE stent/ angioplasty) complicated with poorly healing right inguinal wound s/p prior surgical debridment (by Dr Siu  ) A.Fib not on anticoagulation due to GI bleeding, Hypertension, COPD on home O2 2L, SHAYY on nocturnal BIPAP, Chronic diastolic CHF, Hyperlipidemia, PVD, Iron deficiency anemia, Chronic back pain, Gout, BPH, CKD III with baseline Cr 2, 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 He has gradual onset  progressive fatigue over the last couple of weeks. Patient is unable to ambulate because of his leg ulcers. In ER, he was started on vancomycin IV and zosyn.    Lying in bed in NAD  Weak looking  No fever or chills  Denies pain    MEDICATIONS  (STANDING):  buDESOnide   0.5 milliGRAM(s) Respule 0.5milliGRAM(s) Inhalation two times a day  doxazosin 8milliGRAM(s) Oral at bedtime  isosorbide   mononitrate ER Tablet (IMDUR) 120milliGRAM(s) Oral daily  diltiazem    Tablet 120milliGRAM(s) Oral every 12 hours  allopurinol 100milliGRAM(s) Oral daily  fenofibrate Tablet 145milliGRAM(s) Oral daily  simvastatin 10milliGRAM(s) Oral at bedtime  pramipexole 0.125milliGRAM(s) Oral three times a day  multivitamin Oral Tab/Cap - Peds 1Tablet(s) Oral daily  gabapentin 300milliGRAM(s) Oral at bedtime  metoprolol 12.5milliGRAM(s) Oral two times a day  senna 2Tablet(s) Oral at bedtime  pantoprazole  Injectable 40milliGRAM(s) IV Push every 12 hours  piperacillin/tazobactam IVPB. 3.375Gram(s) IV Intermittent every 8 hours  hydrALAZINE 150milliGRAM(s) Oral every 12 hours  vancomycin  IVPB 500milliGRAM(s) IV Intermittent every 12 hours  heparin  Injectable 5000Unit(s) SubCutaneous every 8 hours  dextrose 5%. 1000milliLiter(s) IV Continuous <Continuous>  sodium chloride 0.9%. 1000milliLiter(s) IV Continuous <Continuous>  potassium chloride  10 mEq/100 mL IVPB 10milliEquivalent(s) IV Intermittent every 1 hour    MEDICATIONS  (PRN):  nitroglycerin     SubLingual 0.4milliGRAM(s) SubLingual three times a day PRN Chest Pain  ondansetron Injectable 4milliGRAM(s) IV Push every 6 hours PRN Nausea  ALBUTerol    90 MICROgram(s) HFA Inhaler 2Puff(s) Inhalation every 6 hours PRN Shortness of Breath and/or Wheezing  HYDROmorphone   Tablet 2milliGRAM(s) Oral every 6 hours PRN Severe Pain (7 - 10)  acetaminophen   Tablet. 325milliGRAM(s) Oral every 4 hours PRN Moderate Pain (4 - 6)      Vital Signs Last 24 Hrs  T(C): 36.9, Max: 37.2 (06-19 @ 21:12)  T(F): 98.4, Max: 99 (06-19 @ 21:12)  HR: 62 (62 - 82)  BP: 148/46 (116/37 - 157/45)  BP(mean): --  RR: 18 (18 - 18)  SpO2: 97% (93% - 97%)    Physical Exam:        Constitutional: frail looking  HEENT: NC/AT, EOMI, PERRLA  Neck: supple  Back: no tenderness  Respiratory: decreased BS at bases  Cardiovascular: S1S2 regular, no murmurs  Abdomen: soft, not tender, not distended, positive BS  Genitourinary: right inguinal open wound  Rectal: deferred  Musculoskeletal: no muscle tenderness, no joint swelling or tenderness  Extremities: 1+ pedal edema; right inguinal open wound - deep; scant discharge; right heel ulcer  Neurological: AxOx3, moving all extremities, no focal deficits  Skin: no rashes    Labs:                        8.3    6.5   )-----------( 367      ( 2017 06:25 )             25.6     06-20    146<H>  |  113<H>  |  32<H>  ----------------------------<  118<H>  3.2<L>   |  27  |  1.33<H>    Ca    7.9<L>      2017 06:25         Vancomycin Level, Trough: 14.2 ug/mL ( @ 05:00)      Cultures:                       8.1    7.0   )-----------( 331      ( 2017 05:00 )             25.2         151<H>  |  116<H>  |  40<H>  ----------------------------<  97  3.6   |  27  |  1.38<H>    Ca    8.1<L>      2017 05:00         Vancomycin Level, Trough: 14.2 ug/mL ( @ 05:00)                          8.2    8.5   )-----------( 312      ( 2017 03:54 )             25.0         148<H>  |  114<H>  |  53<H>  ----------------------------<  129<H>  3.8   |  28  |  1.65<H>    Ca    8.2<L>      2017 03:54  Phos  1.9       Mg     2.1         TPro  5.2<L>  /  Alb  1.7<L>  /  TBili  0.3  /  DBili  x   /  AST  20  /  ALT  12  /  AlkPhos  57       LIVER FUNCTIONS - ( 2017 03:54 )  Alb: 1.7 g/dL / Pro: 5.2 gm/dL / ALK PHOS: 57 U/L / ALT: 12 U/L / AST: 20 U/L / GGT: x           Urinalysis Basic - ( 2017 10:36 )    Color: Yellow / Appearance: Clear / S.010 / pH: x  Gluc: x / Ketone: Negative  / Bili: Negative / Urobili: Negative mg/dL   Blood: x / Protein: 15 mg/dL / Nitrite: Negative   Leuk Esterase: Trace / RBC: x / WBC 0-2   Sq Epi: x / Non Sq Epi: x / Bacteria: x    Culture - Blood (17 @ 03:54)    Specimen Source: .Blood Blood    Culture Results:   No growth to date.    Culture - Other (17 @ 12:50)    Specimen Source: .Other right groin wound    Culture Results:   Moderate Mixed gram negative rods  Few Enterococcus species          Radiology:    Right foot MRI:  1.  Large skin ulcer at the posterior aspect of the heel.  2.  Fluid tracking from the skin ulcer towards the medial calcaneus   suspicious for small abscess.  3.  Osteomyelitis of the posterior calcaneus.  4.  Osteonecrosis within the posterior calcaneus.  5.  Partial tearing of the lateral Achilles insertion.  6.  Full-thickness tearing of the lateral cord of the plantar fascia with   partial tear of the central cord.    Advanced directives addressed: full resuscitation

## 2017-06-20 NOTE — PROGRESS NOTE ADULT - SUBJECTIVE AND OBJECTIVE BOX
Pt has been seen and examined with FP resident, resident supervised agree with a/p       Patient is a 82y old  Male who presents with a chief complaint of Sent from [facility] rehab because of low hemoglobin 7.7 (17 Jun 2017 00:37)        HPI:  82 year old male with history of CAD s/p stents (LAD, RCA bare metal around 9/16),PVD (RLE stent/ angioplasty and surgical debridment by Dr Siu 5/20 ) A.Fib not on anticoagulation due to GI bleeding, Hypertension, COPD on home O2 2L, SHAYY on nocturnal BIPAP, ex-smoker (smoked 1ppd X 50 years, quit 22 years ago),  Chronic diastolic CHF, Hyperlipidemia, PVD, Iron deficiency anemia, Chronic back pain, Gout, BPH, CKD III with baseline Cr 2. Was ecently admitted to  on  4/17 with anemia of GI bleeding, RLE and RUE DVTs,( received pRBCs and IVC filter discharged to rehab with aspirin) was readmitted to   ER on 5/4/17 for worsening anemia with Hb 6, worsening leg pain from ulcers and worsening renal function Cr 3.99 now presents from [facility] with anemia (7.7 on labs today decreasing from 9.5 over past few weeks). Pt c/o gradual onset  progressive fatigue over the last couple of weeks. Patient is unable to ambulate because of his leg cellulitis and ulcers. He denies any shortness of breath, palpitations / chest pain / light headedness. According to the daughter the attendants at [facility] did notice dark stools for the last couple of days. Patient denies any abdominal pain or change in bowel or urinary habits.  In ED, + guaiac. (17 Jun 2017 00:37)        PHYSICAL EXAM:  Vital Signs Last 24 Hrs  T(C): 36.6, Max: 37.2 (06-19 @ 21:12)  T(F): 97.9, Max: 99 (06-19 @ 21:12)  HR: 64 (62 - 82)  BP: 142/36 (116/37 - 157/45)  BP(mean): --  RR: 18 (18 - 18)  SpO2: 96% (93% - 97%)  general- comfortable   -rs-aeeb,cta  -cvs-s1s2 normal   -p/a-soft,bs+  -extremity- right leg groin wound and leg wound noted, eschar present in right heel and right side of leg  -cns- non focal         A/P    #Anemia-Anemia due to chronic disease or gi bleed   -EGD was cancelled considering no significant drop in h/h   -stable h/h so far     #Hypernatremia- monitor it, give gentle dextrose, ct to hold off on lasix     #fever- possibly from wound infection - OM of calcaneum   -ct abx, local care, Dr. Mccracken evlauation appreciated   -possible  muscle flap  procedure today at groin area of wound     #DVT pr-heparin, no drop in h/h so far

## 2017-06-20 NOTE — PROGRESS NOTE ADULT - SUBJECTIVE AND OBJECTIVE BOX
Chief Complaint/Reason for Admission: Sent from [facility] rehab because of low hemoglobin 7.7	  History of Present Illness: 	  82 year old male with history of CAD s/p stents (LAD, RCA bare metal around 9/16),PVD (RLE stent/ angioplasty and surgical debridment by Dr Siu 5/20 ) Brianna not on anticoagulation due to GI bleeding, Hypertension, COPD on home O2 2L, SHAYY on nocturnal BIPAP, ex-smoker (smoked 1ppd X 50 years, quit 22 years ago),  Chronic diastolic CHF, Hyperlipidemia, PVD, Iron deficiency anemia, Chronic back pain, Gout, BPH, CKD III with baseline Cr 2. Was ecently admitted to  on  4/17 with anemia of GI bleeding, RLE and RUE DVTs,( received pRBCs and IVC filter discharged to rehab with aspirin) was readmitted to   ER on 5/4/17 for worsening anemia with Hb 6, worsening leg pain from ulcers and worsening renal function Cr 3.99 now presents from [facility] with anemia (7.7 on labs today decreasing from 9.5 over past few weeks). Pt c/o gradual onset  progressive fatigue over the last couple of weeks. Patient is unable to ambulate because of his leg cellulitis and ulcers. He denies any shortness of breath, palpitations / chest pain / light headedness. According to the daughter the attendants at [facility] did notice dark stools for the last couple of days. Patient denies any abdominal pain or change in bowel or urinary habits.  In ED, + guaiac.     6/19 - Patient seen and examined. Reports 6 loose BM since this morning.  Patient has been placed on isolation until Cdiff has been rulled out.  Afebrile. hemodynamically stable.     6/20 - Patient going for muscle flap of groin area tonight after 1u PRBCs for anem.  Hemodynamically stable. Patient had low grade temp overnight.  On Vanco/Zosyn #3.  Tolerating abx well.  No Diarrhea, no rash.     MEDICATIONS  (STANDING):  buDESOnide   0.5 milliGRAM(s) Respule 0.5milliGRAM(s) Inhalation two times a day  doxazosin 8milliGRAM(s) Oral at bedtime  isosorbide   mononitrate ER Tablet (IMDUR) 120milliGRAM(s) Oral daily  diltiazem    Tablet 120milliGRAM(s) Oral every 12 hours  allopurinol 100milliGRAM(s) Oral daily  fenofibrate Tablet 145milliGRAM(s) Oral daily  simvastatin 10milliGRAM(s) Oral at bedtime  pramipexole 0.125milliGRAM(s) Oral three times a day  multivitamin Oral Tab/Cap - Peds 1Tablet(s) Oral daily  gabapentin 300milliGRAM(s) Oral at bedtime  metoprolol 12.5milliGRAM(s) Oral two times a day  senna 2Tablet(s) Oral at bedtime  pantoprazole  Injectable 40milliGRAM(s) IV Push every 12 hours  piperacillin/tazobactam IVPB. 3.375Gram(s) IV Intermittent every 8 hours  hydrALAZINE 150milliGRAM(s) Oral every 12 hours  vancomycin  IVPB 500milliGRAM(s) IV Intermittent every 12 hours  heparin  Injectable 5000Unit(s) SubCutaneous every 8 hours  dextrose 5%. 1000milliLiter(s) IV Continuous <Continuous>    MEDICATIONS  (PRN):  nitroglycerin     SubLingual 0.4milliGRAM(s) SubLingual three times a day PRN Chest Pain  ondansetron Injectable 4milliGRAM(s) IV Push every 6 hours PRN Nausea  ALBUTerol    90 MICROgram(s) HFA Inhaler 2Puff(s) Inhalation every 6 hours PRN Shortness of Breath and/or Wheezing  HYDROmorphone   Tablet 2milliGRAM(s) Oral every 6 hours PRN Severe Pain (7 - 10)  acetaminophen   Tablet. 325milliGRAM(s) Oral every 4 hours PRN Moderate Pain (4 - 6)    ICU Vital Signs Last 24 Hrs  T(C): 36.6, Max: 37.2 (06-19 @ 21:12)  T(F): 97.9, Max: 99 (06-19 @ 21:12)  HR: 77 (62 - 82)  BP: 161/46 (116/37 - 165/39)  BP(mean): --  ABP: --  ABP(mean): --  RR: 18 (18 - 18)  SpO2: 97% (93% - 97%)      GEN: NAD, comfortable, resting in bed  CV: S1S2, RRR, no mumur  RESP: good air movement, CTABL, no rales, rhonchi or wheezing  ABD: +BS, soft, ND, NT, no guarding, no rigidity  Skin: R groin incision open with fibrinous debris at base, patch not exposed; distal leg wounds granulating with scattered areas of eschar, tendon on R lateral leg exposed and necrotic; foot intact; L leg with stasis changes but no cellulitis or ulcers  EXT: +2 radial and pedial pulses, no edema, no calve tenderness                                   8.3    6.5   )-----------( 367      ( 20 Jun 2017 06:25 )             25.6     20 Jun 2017 06:25    146    |  113    |  32     ----------------------------<  118    3.2     |  27     |  1.33     Ca    7.9        20 Jun 2017 06:25          CAPILLARY BLOOD GLUCOSE

## 2017-06-20 NOTE — PROGRESS NOTE ADULT - PROBLEM SELECTOR PLAN 1
- HH stable  - Likely Anemia of Chronic Disease  - No EGD today - patient is high risk  - S/p 1u PRBC

## 2017-06-20 NOTE — PROGRESS NOTE ADULT - ASSESSMENT
Anemia–likely due to anemia of chronic disease and contributing factor possibly from GI source as well.  Would follow serial H&H's.  His hematocrit appears to be stable in the hospital. An extensive discussion with the patient and his daughter. For now, we will hold off on endoscopy as he is at high risk. However, if there is plan for vascular intervention and increasing antiplatelet agents, we may want cross the bridge of endoscopic evaluation again.  restart iron    Would clinically follow and ascertain for evidence of significant bleeding. Aspirin is on hold at this time.    Fevers–possibly from foot infection. Discussed with vascular surgery there are also be assessing for osteomyelitis as well as dealing with the groin area that may need a muscle flap as well.    This was discussed with patient and daughter as well as vascular surgery.  Would continue proton pump inhibitor twice a day for now.    diarrhea resolved

## 2017-06-21 LAB
ANION GAP SERPL CALC-SCNC: 10 MMOL/L — SIGNIFICANT CHANGE UP (ref 5–17)
BUN SERPL-MCNC: 29 MG/DL — HIGH (ref 7–23)
CALCIUM SERPL-MCNC: 7.7 MG/DL — LOW (ref 8.5–10.1)
CHLORIDE SERPL-SCNC: 115 MMOL/L — HIGH (ref 96–108)
CO2 SERPL-SCNC: 23 MMOL/L — SIGNIFICANT CHANGE UP (ref 22–31)
CREAT SERPL-MCNC: 1.29 MG/DL — SIGNIFICANT CHANGE UP (ref 0.5–1.3)
CULTURE RESULTS: SIGNIFICANT CHANGE UP
GLUCOSE SERPL-MCNC: 133 MG/DL — HIGH (ref 70–99)
HCT VFR BLD CALC: 27.8 % — LOW (ref 39–50)
HCT VFR BLD CALC: 28.5 % — LOW (ref 39–50)
HCT VFR BLD CALC: 28.8 % — LOW (ref 39–50)
HCT VFR BLD CALC: 29.8 % — LOW (ref 39–50)
HCT VFR BLD CALC: 32.7 % — LOW (ref 39–50)
HGB BLD-MCNC: 10.2 G/DL — LOW (ref 13–17)
HGB BLD-MCNC: 9.1 G/DL — LOW (ref 13–17)
HGB BLD-MCNC: 9.3 G/DL — LOW (ref 13–17)
HGB BLD-MCNC: 9.3 G/DL — LOW (ref 13–17)
HGB BLD-MCNC: 9.7 G/DL — LOW (ref 13–17)
MCHC RBC-ENTMCNC: 28.2 PG — SIGNIFICANT CHANGE UP (ref 27–34)
MCHC RBC-ENTMCNC: 31.2 GM/DL — LOW (ref 32–36)
MCV RBC AUTO: 90.4 FL — SIGNIFICANT CHANGE UP (ref 80–100)
PLATELET # BLD AUTO: 366 K/UL — SIGNIFICANT CHANGE UP (ref 150–400)
POTASSIUM SERPL-MCNC: 3.8 MMOL/L — SIGNIFICANT CHANGE UP (ref 3.5–5.3)
POTASSIUM SERPL-SCNC: 3.8 MMOL/L — SIGNIFICANT CHANGE UP (ref 3.5–5.3)
RBC # BLD: 3.62 M/UL — LOW (ref 4.2–5.8)
RBC # FLD: 16 % — HIGH (ref 10.3–14.5)
SODIUM SERPL-SCNC: 148 MMOL/L — HIGH (ref 135–145)
SPECIMEN SOURCE: SIGNIFICANT CHANGE UP
WBC # BLD: 8 K/UL — SIGNIFICANT CHANGE UP (ref 3.8–10.5)
WBC # FLD AUTO: 8 K/UL — SIGNIFICANT CHANGE UP (ref 3.8–10.5)

## 2017-06-21 RX ADMIN — Medication 325 MILLIGRAM(S): at 10:42

## 2017-06-21 RX ADMIN — Medication 100 MILLIGRAM(S): at 17:32

## 2017-06-21 RX ADMIN — Medication 100 MILLIGRAM(S): at 12:29

## 2017-06-21 RX ADMIN — Medication 8 MILLIGRAM(S): at 21:46

## 2017-06-21 RX ADMIN — PIPERACILLIN AND TAZOBACTAM 25 GRAM(S): 4; .5 INJECTION, POWDER, LYOPHILIZED, FOR SOLUTION INTRAVENOUS at 13:04

## 2017-06-21 RX ADMIN — PIPERACILLIN AND TAZOBACTAM 25 GRAM(S): 4; .5 INJECTION, POWDER, LYOPHILIZED, FOR SOLUTION INTRAVENOUS at 21:45

## 2017-06-21 RX ADMIN — HEPARIN SODIUM 5000 UNIT(S): 5000 INJECTION INTRAVENOUS; SUBCUTANEOUS at 13:05

## 2017-06-21 RX ADMIN — ISOSORBIDE MONONITRATE 120 MILLIGRAM(S): 60 TABLET, EXTENDED RELEASE ORAL at 10:44

## 2017-06-21 RX ADMIN — Medication 325 MILLIGRAM(S): at 17:30

## 2017-06-21 RX ADMIN — Medication 50 MILLIGRAM(S): at 06:41

## 2017-06-21 RX ADMIN — PRAMIPEXOLE DIHYDROCHLORIDE 0.12 MILLIGRAM(S): 0.12 TABLET ORAL at 10:42

## 2017-06-21 RX ADMIN — PIPERACILLIN AND TAZOBACTAM 25 GRAM(S): 4; .5 INJECTION, POWDER, LYOPHILIZED, FOR SOLUTION INTRAVENOUS at 06:43

## 2017-06-21 RX ADMIN — Medication 500 MILLIGRAM(S): at 21:45

## 2017-06-21 RX ADMIN — Medication 1 TABLET(S): at 10:43

## 2017-06-21 RX ADMIN — Medication 25 MILLIGRAM(S): at 06:40

## 2017-06-21 RX ADMIN — HEPARIN SODIUM 5000 UNIT(S): 5000 INJECTION INTRAVENOUS; SUBCUTANEOUS at 06:41

## 2017-06-21 RX ADMIN — Medication 0.5 MILLIGRAM(S): at 07:37

## 2017-06-21 RX ADMIN — SENNA PLUS 2 TABLET(S): 8.6 TABLET ORAL at 21:45

## 2017-06-21 RX ADMIN — Medication 100 MILLIGRAM(S): at 10:44

## 2017-06-21 RX ADMIN — GABAPENTIN 300 MILLIGRAM(S): 400 CAPSULE ORAL at 10:43

## 2017-06-21 RX ADMIN — Medication 120 MILLIGRAM(S): at 07:42

## 2017-06-21 RX ADMIN — PANTOPRAZOLE SODIUM 40 MILLIGRAM(S): 20 TABLET, DELAYED RELEASE ORAL at 17:34

## 2017-06-21 RX ADMIN — SIMVASTATIN 10 MILLIGRAM(S): 20 TABLET, FILM COATED ORAL at 21:45

## 2017-06-21 RX ADMIN — PANTOPRAZOLE SODIUM 40 MILLIGRAM(S): 20 TABLET, DELAYED RELEASE ORAL at 06:41

## 2017-06-21 RX ADMIN — HEPARIN SODIUM 5000 UNIT(S): 5000 INJECTION INTRAVENOUS; SUBCUTANEOUS at 21:45

## 2017-06-21 RX ADMIN — Medication 145 MILLIGRAM(S): at 10:42

## 2017-06-21 NOTE — PROGRESS NOTE ADULT - SUBJECTIVE AND OBJECTIVE BOX
No in either leg    MEDICATIONS  (STANDING):  allopurinol 100milliGRAM(s) Oral daily  buDESOnide   0.5 milliGRAM(s) Respule 0.5milliGRAM(s) Inhalation daily  doxazosin 8milliGRAM(s) Oral at bedtime  fenofibrate Tablet 145milliGRAM(s) Oral daily  ferrous    sulfate 325milliGRAM(s) Oral two times a day with meals  gabapentin 300milliGRAM(s) Oral daily  hydrALAZINE 50milliGRAM(s) Oral daily  HYDROmorphone   Tablet 2milliGRAM(s) Oral every 6 hours  isosorbide   mononitrate ER Tablet (IMDUR) 120milliGRAM(s) Oral daily  metoprolol 25milliGRAM(s) Oral daily  ondansetron Injectable 4milliGRAM(s) IV Push once  pantoprazole  Injectable 40milliGRAM(s) IV Push every 12 hours  pramipexole 0.125milliGRAM(s) Oral daily  senna 2Tablet(s) Oral at bedtime  simvastatin 10milliGRAM(s) Oral at bedtime  multivitamin 1Tablet(s) Oral daily  diltiazem   CD 120milliGRAM(s) Oral daily  heparin  Injectable 5000Unit(s) SubCutaneous every 8 hours  piperacillin/tazobactam IVPB. 3.375Gram(s) IV Intermittent every 8 hours  vancomycin  IVPB 500milliGRAM(s) IV Intermittent every 12 hours  dextrose 5%. 1000milliLiter(s) IV Continuous <Continuous>    MEDICATIONS  (PRN):  acetaminophen   Tablet. 500milliGRAM(s) Oral every 6 hours PRN Mild Pain (1 - 3)  ALBUTerol    90 MICROgram(s) HFA Inhaler 2Puff(s) Inhalation every 6 hours PRN Shortness of Breath  nitroglycerin     SubLingual 0.4milliGRAM(s) SubLingual every 5 minutes PRN Chest Pain      Allergies    No Known Allergies    Intolerances        Flatus: [ ] YES [ ] NO             Bowel Movement: [ ] YES [ ] NO  Pain (0-10):            Pain Control Adequate: [ ] YES [ ] NO  Nausea: [ ] YES [ ] NO            Vomiting: [ ] YES [ ] NO  Diarrhea: [ ] YES [ ] NO         Constipation: [ ] YES [ ] NO     Chest Pain: [ ] YES [ ] NO    SOB:  [ ] YES [ ] NO    Vital Signs Last 24 Hrs  T(C): 36.6, Max: 36.6 (06-21 @ 12:37)  T(F): 97.9, Max: 97.9 (06-21 @ 16:50)  HR: 84 (68 - 88)  BP: 145/58 (145/58 - 172/39)  BP(mean): --  RR: 18 (18 - 18)  SpO2: 98% (91% - 99%)    I&O's Summary    I & Os for current day (as of 21 Jun 2017 18:42)  =============================================  IN: 609 ml / OUT: 0 ml / NET: 609 ml      Physical Exam:  General: NAD, resting comfortably  Pulmonary: normal resp effort, CTA-B  Cardiovascular: NSR  Abdominal: soft, NT/ND  Extremities: WWP, normal strength  Neuro: A/O x 3, CNs II-XII grossly intact, normal motor/sensation, no focal deficits  Pulses:   Right:                                                                          Left:  FEM [ ]2+ [ ]1+ [ ]doppler                                             FEM [ ]2+ [ ]1+ [ ]doppler    POP [ ]2+ [ ]1+ [ ]doppler                                             POP [ ]2+ [ ]1+ [ ]doppler    DP [ ]2+ [ ]1+ [ ]doppler                                                DP [ ]2+ [ ]1+ [ ]doppler  PT[ ]2+ [ ]1+ [ ]doppler                                                  PT [ ]2+ [ ]1+ [ ]doppler    LABS:                        9.1    x     )-----------( x        ( 21 Jun 2017 16:10 )             27.8     06-21    148<H>  |  115<H>  |  29<H>  ----------------------------<  133<H>  3.8   |  23  |  1.29    Ca    7.7<L>      21 Jun 2017 06:22            CAPILLARY BLOOD GLUCOSE      RADIOLOGY & ADDITIONAL TESTS:

## 2017-06-21 NOTE — PROGRESS NOTE ADULT - SUBJECTIVE AND OBJECTIVE BOX
Patient is a 82y old  Male who presents with a chief complaint of Sent from [facility] rehab because of low hemoglobin 7.7 (17 Jun 2017 00:37)      HPI:  82 year old male with history of CAD s/p stents (LAD, RCA bare metal around 9/16),PVD (RLE stent/ angioplasty and surgical debridment by Dr Siu 5/20 ) A.Fib not on anticoagulation due to GI bleeding, Hypertension, COPD on home O2 2L, SHAYY on nocturnal BIPAP, ex-smoker (smoked 1ppd X 50 years, quit 22 years ago),  Chronic diastolic CHF, Hyperlipidemia, PVD, Iron deficiency anemia, Chronic back pain, Gout, BPH, CKD III with baseline Cr 2. Was ecently admitted to  on  4/17 with anemia of GI bleeding, RLE and RUE DVTs,( received pRBCs and IVC filter discharged to rehab with aspirin) was readmitted to   ER on 5/4/17 for worsening anemia with Hb 6, worsening leg pain from ulcers and worsening renal function Cr 3.99 now presents from [facility] with anemia (7.7 on labs today decreasing from 9.5 over past few weeks). Pt c/o gradual onset  progressive fatigue over the last couple of weeks. Patient is unable to ambulate because of his leg cellulitis and ulcers. He denies any shortness of breath, palpitations / chest pain / light headedness. According to the daughter the attendants at [facility] did notice dark stools for the last couple of days. Patient denies any abdominal pain or change in bowel or urinary habits.  In ED, + guaiac. (17 Jun 2017 00:37)    pt comf  Hx per pt and daughter  neg abd pain  vida diet and had surg    neg V  mild lower abd discomfort that dec with BM      PAST MEDICAL & SURGICAL HISTORY:  Sepsis, due to unspecified organism: 2/2 poorly healing wounds b/l  BPH (benign prostatic hypertrophy)  Hyperlipemia  Coronary artery disease  Hypertension  Dyspepsia: On moderate exertion.  Sleep apnea, obstructive: Requires home 02 therapy, and treatment with BIPAP  Atelectasis  Pleural effusion, bilateral  Respiratory failure  Peripheral edema  CRI (chronic renal insufficiency)  Gout  CRF (chronic renal failure)  Benign prostatic hypertrophy  Spinal stenosis  Hypercholesterolemia  GERD (gastroesophageal reflux disease)  CAD (coronary artery disease)  Hypertension  S/P angioplasty with stent  Cataract of left eye  Prostate: Surgery green light procedure.  S/P rotator cuff surgery: Right  S/P angioplasty  Rotator cuff tear, right: repair      MEDICATIONS  (STANDING):  allopurinol 100milliGRAM(s) Oral daily  buDESOnide   0.5 milliGRAM(s) Respule 0.5milliGRAM(s) Inhalation daily  doxazosin 8milliGRAM(s) Oral at bedtime  fenofibrate Tablet 145milliGRAM(s) Oral daily  ferrous    sulfate 325milliGRAM(s) Oral two times a day with meals  gabapentin 300milliGRAM(s) Oral daily  hydrALAZINE 50milliGRAM(s) Oral daily  HYDROmorphone   Tablet 2milliGRAM(s) Oral every 6 hours  isosorbide   mononitrate ER Tablet (IMDUR) 120milliGRAM(s) Oral daily  metoprolol 25milliGRAM(s) Oral daily  ondansetron Injectable 4milliGRAM(s) IV Push once  pantoprazole  Injectable 40milliGRAM(s) IV Push every 12 hours  pramipexole 0.125milliGRAM(s) Oral daily  senna 2Tablet(s) Oral at bedtime  simvastatin 10milliGRAM(s) Oral at bedtime  multivitamin 1Tablet(s) Oral daily  diltiazem   CD 120milliGRAM(s) Oral daily  heparin  Injectable 5000Unit(s) SubCutaneous every 8 hours  piperacillin/tazobactam IVPB. 3.375Gram(s) IV Intermittent every 8 hours  vancomycin  IVPB 500milliGRAM(s) IV Intermittent every 12 hours  dextrose 5%. 1000milliLiter(s) IV Continuous <Continuous>    MEDICATIONS  (PRN):  acetaminophen   Tablet. 500milliGRAM(s) Oral every 6 hours PRN Mild Pain (1 - 3)  ALBUTerol    90 MICROgram(s) HFA Inhaler 2Puff(s) Inhalation every 6 hours PRN Shortness of Breath  nitroglycerin     SubLingual 0.4milliGRAM(s) SubLingual every 5 minutes PRN Chest Pain      Allergies    No Known Allergies    Intolerances        SOCIAL HISTORY:unchanged    FAMILY HISTORY:  No pertinent family history in first degree relatives      REVIEW OF SYSTEMS:    CONSTITUTIONAL: No weakness, fevers or chills  EYES/ENT: No visual changes;  No vertigo or throat pain   NECK: No pain or stiffness  RESPIRATORY: No cough, wheezing, hemoptysis; No shortness of breath  CARDIOVASCULAR: No chest pain or palpitations  GENITOURINARY: No dysuria, frequency or hematuria  NEUROLOGICAL: No numbness or weakness  SKIN: No itching, burning, rashes, or lesions   All other review of systems is negative unless indicated above.    Vital Signs Last 24 Hrs  T(C): 36.3, Max: 36.9 (06-20 @ 09:28)  T(F): 97.4, Max: 98.5 (06-20 @ 09:28)  HR: 84 (62 - 88)  BP: 158/50 (142/36 - 172/39)  BP(mean): --  RR: 18 (14 - 18)  SpO2: 98% (91% - 100%)    PHYSICAL EXAM:    Constitutional: NAD, well-developed  HEENT: EOMI, throat clear  Neck: No LAD, supple  Respiratory: CTA and P  Cardiovascular: S1 and S2, RRR, no M  Gastrointestinal: BS+, soft, NT/ND, neg HSM,  Extremities: pos peripheral edema, neg clubing, cyanosis, dressing on RLE    Neurological: A/O x 3, no focal deficits  Psychiatric: Normal mood, normal affect  Skin: No rashes    LABS:  CBC Full  -  ( 21 Jun 2017 06:22 )  WBC Count : 8.0 K/uL  Hemoglobin : 10.2 g/dL  Hematocrit : 32.7 %  Platelet Count - Automated : 366 K/uL  Mean Cell Volume : 90.4 fl  Mean Cell Hemoglobin : 28.2 pg  Mean Cell Hemoglobin Concentration : 31.2 gm/dL  Auto Neutrophil # : x  Auto Lymphocyte # : x  Auto Monocyte # : x  Auto Eosinophil # : x  Auto Basophil # : x  Auto Neutrophil % : x  Auto Lymphocyte % : x  Auto Monocyte % : x  Auto Eosinophil % : x  Auto Basophil % : x    06-20    146<H>  |  113<H>  |  32<H>  ----------------------------<  118<H>  3.2<L>   |  27  |  1.33<H>    Ca    7.9<L>      20 Jun 2017 06:25              RADIOLOGY & ADDITIONAL STUDIES:

## 2017-06-21 NOTE — PROGRESS NOTE ADULT - ASSESSMENT
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 He has gradual onset  progressive fatigue over the last couple of weeks. Patient is unable to ambulate because of his leg ulcers. In ER, he was started on vancomycin IV and zosyn.    1. Right inguinal open wound with probable infection with mixed GNR and EN spp. Right heel ulcer with underlying chronic OM.  -weak  -inguinal wound is clean and heel ulcer is dry  -inguinal wound culture shows mixed GNR, EN spp and corynebacterium spp  -on vancomycin 500 mg IV q12h and zosyn 3.375 gm IV q8h # 4  -tolerating abx well so far; no side effects noted  -vancomycin trough level is therapeutic  -podiatry evaluation  -local wound care per surgery  -continue IV abx coverage for now  -monitor temps  -f/u CBC  -supportive care  2. Other issues: CAD s/p stents, PVD, A.Fib not on anticoagulation due to GI bleeding, Hypertension, COPD, SHAYY on nocturnal BIPAP, Chronic diastolic CHF, Hyperlipidemia, PVD, Iron deficiency anemia, Chronic back pain, Gout, BPH, CKD III   -care per medicine

## 2017-06-21 NOTE — PROGRESS NOTE ADULT - PROBLEM SELECTOR PLAN 2
- f/u BC x 2  - inguinal wound culture shows mixed GNR, EN spp and corynebacterium spp  - on vancomycin 500 mg IV q12h and zosyn 3.375 gm IV q8h # 4  - Wound care as per Vascular Surgery  - S/P right groin wound debridement with wound vac in place  - Podiatry consult for achilles tendon rupture repair evaluation

## 2017-06-21 NOTE — PROGRESS NOTE ADULT - SUBJECTIVE AND OBJECTIVE BOX
Patient is a 82y old  Male who presents with a chief complaint of [facility] because [facility] low hemoglobin 7.7 (2017 00:37)    HPI:  83 y/o male with h/o CAD s/p stents (LAD, RCA bare metal around ), PVD (RLE stent/ angioplasty) complicated with poorly healing right inguinal wound s/p prior surgical debridment (by Dr Siu  ) A.Fib not on anticoagulation due to GI bleeding, Hypertension, COPD on home O2 2L, SHAYY on nocturnal BIPAP, Chronic diastolic CHF, Hyperlipidemia, PVD, Iron deficiency anemia, Chronic back pain, Gout, BPH, CKD III with baseline Cr 2, 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 He has gradual onset  progressive fatigue over the last couple of weeks. Patient is unable to ambulate because of his leg ulcers. In ER, he was started on vancomycin IV and zosyn.    Lying in bed in NAD  Weak looking  No fever or chills  Denies pain  Has right groin wound VAC    MEDICATIONS  (STANDING):  allopurinol 100milliGRAM(s) Oral daily  buDESOnide   0.5 milliGRAM(s) Respule 0.5milliGRAM(s) Inhalation daily  doxazosin 8milliGRAM(s) Oral at bedtime  fenofibrate Tablet 145milliGRAM(s) Oral daily  ferrous    sulfate 325milliGRAM(s) Oral two times a day with meals  gabapentin 300milliGRAM(s) Oral daily  hydrALAZINE 50milliGRAM(s) Oral daily  HYDROmorphone   Tablet 2milliGRAM(s) Oral every 6 hours  isosorbide   mononitrate ER Tablet (IMDUR) 120milliGRAM(s) Oral daily  metoprolol 25milliGRAM(s) Oral daily  ondansetron Injectable 4milliGRAM(s) IV Push once  pantoprazole  Injectable 40milliGRAM(s) IV Push every 12 hours  pramipexole 0.125milliGRAM(s) Oral daily  senna 2Tablet(s) Oral at bedtime  simvastatin 10milliGRAM(s) Oral at bedtime  multivitamin 1Tablet(s) Oral daily  diltiazem   CD 120milliGRAM(s) Oral daily  heparin  Injectable 5000Unit(s) SubCutaneous every 8 hours  piperacillin/tazobactam IVPB. 3.375Gram(s) IV Intermittent every 8 hours  vancomycin  IVPB 500milliGRAM(s) IV Intermittent every 12 hours  dextrose 5%. 1000milliLiter(s) IV Continuous <Continuous>    MEDICATIONS  (PRN):  acetaminophen   Tablet. 500milliGRAM(s) Oral every 6 hours PRN Mild Pain (1 - 3)  ALBUTerol    90 MICROgram(s) HFA Inhaler 2Puff(s) Inhalation every 6 hours PRN Shortness of Breath  nitroglycerin     SubLingual 0.4milliGRAM(s) SubLingual every 5 minutes PRN Chest Pain      Vital Signs Last 24 Hrs  T(C): 36.6, Max: 36.8 ( @ 18:30)  T(F): 97.8, Max: 98.2 ( @ 18:30)  HR: 68 (64 - 88)  BP: 164/49 (142/36 - 172/39)  BP(mean): --  RR: 18 (14 - 18)  SpO2: 99% (91% - 100%)    Physical Exam:        Constitutional: frail looking  HEENT: NC/AT, EOMI, PERRLA  Neck: supple  Back: no tenderness  Respiratory: decreased BS at bases  Cardiovascular: S1S2 regular, no murmurs  Abdomen: soft, not tender, not distended, positive BS  Genitourinary: right inguinal open wound with VAC in place  Rectal: deferred  Musculoskeletal: no muscle tenderness, no joint swelling or tenderness  Extremities: 1+ pedal edema; right inguinal open wound - deep; scant discharge; right heel ulcer  Neurological: AxOx3, moving all extremities, no focal deficits  Skin: no rashes    Labs:                        10.2   8.0   )-----------( 366      ( 2017 06:22 )             32.7     06-21    148<H>  |  115<H>  |  29<H>  ----------------------------<  133<H>  3.8   |  23  |  1.29    Ca    7.7<L>      2017 06:22                          8.3    6.5   )-----------( 367      ( 2017 06:25 )             25.6     06-20    146<H>  |  113<H>  |  32<H>  ----------------------------<  118<H>  3.2<L>   |  27  |  1.33<H>    Ca    7.9<L>      2017 06:25         Vancomycin Level, Trough: 14.2 ug/mL ( @ 05:00)      Cultures:                       8.1    7.0   )-----------( 331      ( 2017 05:00 )             25.2     -    151<H>  |  116<H>  |  40<H>  ----------------------------<  97  3.6   |  27  |  1.38<H>    Ca    8.1<L>      2017 05:00         Vancomycin Level, Trough: 14.2 ug/mL ( @ 05:00)                          8.2    8.5   )-----------( 312      ( 2017 03:54 )             25.0         148<H>  |  114<H>  |  53<H>  ----------------------------<  129<H>  3.8   |  28  |  1.65<H>    Ca    8.2<L>      2017 03:54  Phos  1.9       Mg     2.1         TPro  5.2<L>  /  Alb  1.7<L>  /  TBili  0.3  /  DBili  x   /  AST  20  /  ALT  12  /  AlkPhos  57  17     LIVER FUNCTIONS - ( 2017 03:54 )  Alb: 1.7 g/dL / Pro: 5.2 gm/dL / ALK PHOS: 57 U/L / ALT: 12 U/L / AST: 20 U/L / GGT: x           Urinalysis Basic - ( 2017 10:36 )    Color: Yellow / Appearance: Clear / S.010 / pH: x  Gluc: x / Ketone: Negative  / Bili: Negative / Urobili: Negative mg/dL   Blood: x / Protein: 15 mg/dL / Nitrite: Negative   Culture - Other (17 @ 12:50)    Specimen Source: .Other right groin wound    Culture Results:   Moderate Mixed gram negative rods  Few Enterococcus species  Rare Corynebacterium species    Leuk Esterase: Trace / RBC: x / WBC 0-2   Sq Epi: x / Non Sq Epi: x / Bacteria: x    Culture - Blood (17 @ 03:54)    Specimen Source: .Blood Blood    Culture Results:   No growth to date.    Culture - Other (17 @ 12:50)    Specimen Source: .Other right groin wound    Culture Results:   Moderate Mixed gram negative rods  Few Enterococcus species          Radiology:    Right foot MRI:  1.  Large skin ulcer at the posterior aspect of the heel.  2.  Fluid tracking from the skin ulcer towards the medial calcaneus   suspicious for small abscess.  3.  Osteomyelitis of the posterior calcaneus.  4.  Osteonecrosis within the posterior calcaneus.  5.  Partial tearing of the lateral Achilles insertion.  6.  Full-thickness tearing of the lateral cord of the plantar fascia with   partial tear of the central cord.    Advanced directives addressed: full resuscitation

## 2017-06-21 NOTE — PROGRESS NOTE ADULT - SUBJECTIVE AND OBJECTIVE BOX
Pt has been seen and examined with FP resident, resident supervised agree with a/p       Patient is a 82y old  Male who presents with a chief complaint of Sent from [facility] rehab because of low hemoglobin 7.7 (17 Jun 2017 00:37)        HPI:  82 year old male with history of CAD s/p stents (LAD, RCA bare metal around 9/16),PVD (RLE stent/ angioplasty and surgical debridment by Dr Siu 5/20 ) A.Fib not on anticoagulation due to GI bleeding, Hypertension, COPD on home O2 2L, SHAYY on nocturnal BIPAP, ex-smoker (smoked 1ppd X 50 years, quit 22 years ago),  Chronic diastolic CHF, Hyperlipidemia, PVD, Iron deficiency anemia, Chronic back pain, Gout, BPH, CKD III with baseline Cr 2. Was ecently admitted to  on  4/17 with anemia of GI bleeding, RLE and RUE DVTs,( received pRBCs and IVC filter discharged to rehab with aspirin) was readmitted to   ER on 5/4/17 for worsening anemia with Hb 6, worsening leg pain from ulcers and worsening renal function Cr 3.99 now presents from [facility] with anemia (7.7 on labs today decreasing from 9.5 over past few weeks). Pt c/o gradual onset  progressive fatigue over the last couple of weeks. Patient is unable to ambulate because of his leg cellulitis and ulcers. He denies any shortness of breath, palpitations / chest pain / light headedness. According to the daughter the attendants at [facility] did notice dark stools for the last couple of days. Patient denies any abdominal pain or change in bowel or urinary habits.  In ED, + guaiac. (17 Jun 2017 00:37)        PHYSICAL EXAM:  Vital Signs Last 24 Hrs  T(C): 36.6, Max: 36.8 (06-20 @ 18:30)  T(F): 97.8, Max: 98.2 (06-20 @ 18:30)  HR: 68 (68 - 88)  BP: 164/49 (151/45 - 172/39)  BP(mean): --  RR: 18 (14 - 18)  SpO2: 99% (91% - 100%)  general- comfortable   -rs-aeeb,cta  -cvs-s1s2 normal   -p/a-soft,bs+  -extremity- right leg groin wound and leg wound noted, eschar present in right heel and right side of leg  -cns- non focal         A/P    #Anemia-Anemia due to chronic disease or gi bleed   -EGD was cancelled considering no significant drop in h/h   -stable h/h so far     #Hypernatremia- monitor it, give gentle dextrose, ct to hold off on lasix   -better   -free water intake advised     #fever- possibly from wound infection - 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 Mccracken evlauation appreciated   -possible  muscle flap  procedure today at groin area of wound   s/p debridement of wound   -Dr. Mccracken follow up to see if he needs further surgery   -podiatry evaluation     #DVT pr-heparin, no drop in h/h so far    #discharge plan

## 2017-06-21 NOTE — PROGRESS NOTE ADULT - ASSESSMENT
Anemia–likely due to anemia of chronic disease and contributing factor possibly from GI source as well.  Would follow serial H&H's.  SP PRBC for surg and hct inc  His hematocrit appears to be stable in the hospital. I had discussion with the patient and his daughter. For now, we will hold off on endoscopy as he is at high risk. However, if there is plan for vascular intervention and increasing antiplatelet agents, we may want cross the bridge of endoscopic evaluation again.  restart iron and vida    Would clinically follow and ascertain for evidence of significant bleeding. Aspirin is on hold at this time.    Would continue proton pump inhibitor twice a day for now.    diarrhea resolved

## 2017-06-21 NOTE — DIETITIAN INITIAL EVALUATION ADULT. - PERTINENT MEDS FT
heparin, Zosyn, Vancomycin, Tylenol, Proventil, Zyloprim, Pulmicort, Cardizem, Cardura, Tricor, Neurontin, Dilaudid, Imdur, Zofran, Mirapex, Protonix,

## 2017-06-21 NOTE — DIETITIAN INITIAL EVALUATION ADULT. - OTHER INFO
Pt seen for length of stay. Pt reports good intake. Pt reports nausea while in hospital, reports eating bad seafood. Pt without constipation or diarrhea.

## 2017-06-21 NOTE — PROGRESS NOTE ADULT - SUBJECTIVE AND OBJECTIVE BOX
Chief Complaint/Reason for Admission: Sent from [facility] rehab because of low hemoglobin 7.7	  History of Present Illness: 	  82 year old male with history of CAD s/p stents (LAD, RCA bare metal around 9/16),PVD (RLE stent/ angioplasty and surgical debridment by Dr Siu 5/20 ) Brianna not on anticoagulation due to GI bleeding, Hypertension, COPD on home O2 2L, SHAYY on nocturnal BIPAP, ex-smoker (smoked 1ppd X 50 years, quit 22 years ago),  Chronic diastolic CHF, Hyperlipidemia, PVD, Iron deficiency anemia, Chronic back pain, Gout, BPH, CKD III with baseline Cr 2. Was ecently admitted to  on  4/17 with anemia of GI bleeding, RLE and RUE DVTs,( received pRBCs and IVC filter discharged to rehab with aspirin) was readmitted to   ER on 5/4/17 for worsening anemia with Hb 6, worsening leg pain from ulcers and worsening renal function Cr 3.99 now presents from [facility] with anemia (7.7 on labs today decreasing from 9.5 over past few weeks). Pt c/o gradual onset  progressive fatigue over the last couple of weeks. Patient is unable to ambulate because of his leg cellulitis and ulcers. He denies any shortness of breath, palpitations / chest pain / light headedness. According to the daughter the attendants at [facility] did notice dark stools for the last couple of days. Patient denies any abdominal pain or change in bowel or urinary habits.  In ED, + guaiac.     6/19 - Patient seen and examined. Reports 6 loose BM since this morning.  Patient has been placed on isolation until Cdiff has been rulled out.  Afebrile. hemodynamically stable.     6/20 - Patient going for muscle flap of groin area tonight after 1u PRBCs for anem.  Hemodynamically stable. Patient had low grade temp overnight.  On Vanco/Zosyn #3.  Tolerating abx well.  No Diarrhea, no rash.     6/21 - Patient seen and examined with daughter at bedside eating breakfast.  S/P right femoral wound debridement Day #1.  Wound vac in place and functional.  patient is feeling well with no complaints at this time. Has elevated BP, Will increased dose of Metoprolol to BID.  Podiatry consult placed to evaluate right achilles tendon rupture repair. Hemodynamically stable, afebrile. On Vanco/Zosyn day #4.    MEDICATIONS  (STANDING):  allopurinol 100milliGRAM(s) Oral daily  buDESOnide   0.5 milliGRAM(s) Respule 0.5milliGRAM(s) Inhalation daily  doxazosin 8milliGRAM(s) Oral at bedtime  fenofibrate Tablet 145milliGRAM(s) Oral daily  ferrous    sulfate 325milliGRAM(s) Oral two times a day with meals  gabapentin 300milliGRAM(s) Oral daily  hydrALAZINE 50milliGRAM(s) Oral daily  HYDROmorphone   Tablet 2milliGRAM(s) Oral every 6 hours  isosorbide   mononitrate ER Tablet (IMDUR) 120milliGRAM(s) Oral daily  metoprolol 25milliGRAM(s) Oral daily  pantoprazole  Injectable 40milliGRAM(s) IV Push every 12 hours  pramipexole 0.125milliGRAM(s) Oral daily  senna 2Tablet(s) Oral at bedtime  simvastatin 10milliGRAM(s) Oral at bedtime  multivitamin 1Tablet(s) Oral daily  diltiazem   CD 120milliGRAM(s) Oral daily  heparin  Injectable 5000Unit(s) SubCutaneous every 8 hours  piperacillin/tazobactam IVPB. 3.375Gram(s) IV Intermittent every 8 hours  vancomycin  IVPB 500milliGRAM(s) IV Intermittent every 12 hours  dextrose 5%. 1000milliLiter(s) IV Continuous <Continuous>    MEDICATIONS  (PRN):  acetaminophen   Tablet. 500milliGRAM(s) Oral every 6 hours PRN Mild Pain (1 - 3)  ALBUTerol    90 MICROgram(s) HFA Inhaler 2Puff(s) Inhalation every 6 hours PRN Shortness of Breath  nitroglycerin     SubLingual 0.4milliGRAM(s) SubLingual every 5 minutes PRN Chest Pain      ICU Vital Signs Last 24 Hrs  T(C): 36.8, Max: 36.8 (06-21 @ 21:53)  T(F): 98.2, Max: 98.2 (06-21 @ 21:53)  HR: 84 (68 - 84)  BP: 153/51 (145/58 - 164/49)  BP(mean): --  ABP: --  ABP(mean): --  RR: 18 (18 - 18)  SpO2: 98% (96% - 99%)        GEN: NAD, comfortable, resting in bed  CV: S1S2, RRR, no mumur  RESP: good air movement, CTABL, no rales, rhonchi or wheezing  ABD: +BS, soft, ND, NT, no guarding, no rigidity  Skin: R groin wound vac in place and functional; distal leg wounds granulating with scattered areas of eschar, tendon on R lateral leg exposed and necrotic; foot intact; L leg with stasis changes but no cellulitis or ulcers  EXT: +2 radial and pedial pulses, no edema, no calve tenderness                                   9.3    x     )-----------( x        ( 21 Jun 2017 21:54 )             29.8     21 Jun 2017 06:22    148    |  115    |  29     ----------------------------<  133    3.8     |  23     |  1.29     Ca    7.7        21 Jun 2017 06:22          CAPILLARY BLOOD GLUCOSE

## 2017-06-22 LAB
ANION GAP SERPL CALC-SCNC: 5 MMOL/L — SIGNIFICANT CHANGE UP (ref 5–17)
BUN SERPL-MCNC: 30 MG/DL — HIGH (ref 7–23)
CALCIUM SERPL-MCNC: 7.4 MG/DL — LOW (ref 8.5–10.1)
CHLORIDE SERPL-SCNC: 115 MMOL/L — HIGH (ref 96–108)
CO2 SERPL-SCNC: 26 MMOL/L — SIGNIFICANT CHANGE UP (ref 22–31)
CREAT SERPL-MCNC: 1.15 MG/DL — SIGNIFICANT CHANGE UP (ref 0.5–1.3)
CULTURE RESULTS: SIGNIFICANT CHANGE UP
GLUCOSE SERPL-MCNC: 94 MG/DL — SIGNIFICANT CHANGE UP (ref 70–99)
HCT VFR BLD CALC: 27.9 % — LOW (ref 39–50)
HGB BLD-MCNC: 8.8 G/DL — LOW (ref 13–17)
MCHC RBC-ENTMCNC: 28.2 PG — SIGNIFICANT CHANGE UP (ref 27–34)
MCHC RBC-ENTMCNC: 31.4 GM/DL — LOW (ref 32–36)
MCV RBC AUTO: 89.7 FL — SIGNIFICANT CHANGE UP (ref 80–100)
PLATELET # BLD AUTO: 346 K/UL — SIGNIFICANT CHANGE UP (ref 150–400)
POTASSIUM SERPL-MCNC: 3.6 MMOL/L — SIGNIFICANT CHANGE UP (ref 3.5–5.3)
POTASSIUM SERPL-SCNC: 3.6 MMOL/L — SIGNIFICANT CHANGE UP (ref 3.5–5.3)
RBC # BLD: 3.11 M/UL — LOW (ref 4.2–5.8)
RBC # FLD: 15.7 % — HIGH (ref 10.3–14.5)
SODIUM SERPL-SCNC: 146 MMOL/L — HIGH (ref 135–145)
SPECIMEN SOURCE: SIGNIFICANT CHANGE UP
SURGICAL PATHOLOGY FINAL REPORT - CH: SIGNIFICANT CHANGE UP
WBC # BLD: 6.3 K/UL — SIGNIFICANT CHANGE UP (ref 3.8–10.5)
WBC # FLD AUTO: 6.3 K/UL — SIGNIFICANT CHANGE UP (ref 3.8–10.5)

## 2017-06-22 RX ORDER — HYDROMORPHONE HYDROCHLORIDE 2 MG/ML
0.5 INJECTION INTRAMUSCULAR; INTRAVENOUS; SUBCUTANEOUS EVERY 6 HOURS
Qty: 0 | Refills: 0 | Status: DISCONTINUED | OUTPATIENT
Start: 2017-06-22 | End: 2017-06-27

## 2017-06-22 RX ADMIN — HYDROMORPHONE HYDROCHLORIDE 2 MILLIGRAM(S): 2 INJECTION INTRAMUSCULAR; INTRAVENOUS; SUBCUTANEOUS at 12:54

## 2017-06-22 RX ADMIN — Medication 145 MILLIGRAM(S): at 12:51

## 2017-06-22 RX ADMIN — SIMVASTATIN 10 MILLIGRAM(S): 20 TABLET, FILM COATED ORAL at 22:35

## 2017-06-22 RX ADMIN — Medication 100 MILLIGRAM(S): at 12:51

## 2017-06-22 RX ADMIN — HEPARIN SODIUM 5000 UNIT(S): 5000 INJECTION INTRAVENOUS; SUBCUTANEOUS at 22:35

## 2017-06-22 RX ADMIN — PIPERACILLIN AND TAZOBACTAM 25 GRAM(S): 4; .5 INJECTION, POWDER, LYOPHILIZED, FOR SOLUTION INTRAVENOUS at 05:49

## 2017-06-22 RX ADMIN — HEPARIN SODIUM 5000 UNIT(S): 5000 INJECTION INTRAVENOUS; SUBCUTANEOUS at 05:49

## 2017-06-22 RX ADMIN — Medication 325 MILLIGRAM(S): at 10:41

## 2017-06-22 RX ADMIN — Medication 0.5 MILLIGRAM(S): at 07:42

## 2017-06-22 RX ADMIN — ISOSORBIDE MONONITRATE 120 MILLIGRAM(S): 60 TABLET, EXTENDED RELEASE ORAL at 12:52

## 2017-06-22 RX ADMIN — PIPERACILLIN AND TAZOBACTAM 25 GRAM(S): 4; .5 INJECTION, POWDER, LYOPHILIZED, FOR SOLUTION INTRAVENOUS at 22:35

## 2017-06-22 RX ADMIN — Medication 120 MILLIGRAM(S): at 05:49

## 2017-06-22 RX ADMIN — Medication 100 MILLIGRAM(S): at 05:49

## 2017-06-22 RX ADMIN — GABAPENTIN 300 MILLIGRAM(S): 400 CAPSULE ORAL at 12:51

## 2017-06-22 RX ADMIN — HYDROMORPHONE HYDROCHLORIDE 2 MILLIGRAM(S): 2 INJECTION INTRAMUSCULAR; INTRAVENOUS; SUBCUTANEOUS at 05:49

## 2017-06-22 RX ADMIN — HYDROMORPHONE HYDROCHLORIDE 2 MILLIGRAM(S): 2 INJECTION INTRAMUSCULAR; INTRAVENOUS; SUBCUTANEOUS at 22:49

## 2017-06-22 RX ADMIN — Medication 25 MILLIGRAM(S): at 05:49

## 2017-06-22 RX ADMIN — PANTOPRAZOLE SODIUM 40 MILLIGRAM(S): 20 TABLET, DELAYED RELEASE ORAL at 18:16

## 2017-06-22 RX ADMIN — Medication 100 MILLIGRAM(S): at 18:16

## 2017-06-22 RX ADMIN — HEPARIN SODIUM 5000 UNIT(S): 5000 INJECTION INTRAVENOUS; SUBCUTANEOUS at 14:52

## 2017-06-22 RX ADMIN — PANTOPRAZOLE SODIUM 40 MILLIGRAM(S): 20 TABLET, DELAYED RELEASE ORAL at 05:49

## 2017-06-22 RX ADMIN — PIPERACILLIN AND TAZOBACTAM 25 GRAM(S): 4; .5 INJECTION, POWDER, LYOPHILIZED, FOR SOLUTION INTRAVENOUS at 14:52

## 2017-06-22 RX ADMIN — Medication 8 MILLIGRAM(S): at 22:35

## 2017-06-22 RX ADMIN — Medication 1 TABLET(S): at 12:53

## 2017-06-22 RX ADMIN — PRAMIPEXOLE DIHYDROCHLORIDE 0.12 MILLIGRAM(S): 0.12 TABLET ORAL at 12:52

## 2017-06-22 RX ADMIN — Medication 325 MILLIGRAM(S): at 18:04

## 2017-06-22 RX ADMIN — Medication 50 MILLIGRAM(S): at 05:49

## 2017-06-22 RX ADMIN — HYDROMORPHONE HYDROCHLORIDE 2 MILLIGRAM(S): 2 INJECTION INTRAMUSCULAR; INTRAVENOUS; SUBCUTANEOUS at 18:16

## 2017-06-22 NOTE — PROGRESS NOTE ADULT - ASSESSMENT
Anemia–likely due to anemia of chronic disease and contributing factor possibly from GI source as well.  Would follow serial H&H's.  SP PRBC for surg and hct inc, and dec likely post op but will follow HCT  His hematocrit appears to be stable in the hospital. I had discussion with the patient and his daughter. For now, we will hold off on endoscopy as he is at high risk. However, if there is plan for vascular intervention and increasing   antiplatelet agents, we may want cross the bridge of endoscopic evaluation again.  restarted iron and vida      CAD: ASA is on hold and will reconsider starting after HCT stable, risks DW pt and he is thinking it over      Would clinically follow and ascertain for evidence of significant bleeding. Aspirin is on hold at this time.    Would continue proton pump inhibitor twice a day for now.    diarrhea resolved

## 2017-06-22 NOTE — PROGRESS NOTE ADULT - SUBJECTIVE AND OBJECTIVE BOX
Patient is a 82y old  Male who presents with a chief complaint of Sent from [facility] rehab because of low hemoglobin 7.7 (17 Jun 2017 00:37)      HPI:  82 year old male with history of CAD s/p stents (LAD, RCA bare metal around 9/16),PVD (RLE stent/ angioplasty and surgical debridment by Dr Siu 5/20 ) A.Fib not on anticoagulation due to GI bleeding, Hypertension, COPD on home O2 2L, SHAYY on nocturnal BIPAP, ex-smoker (smoked 1ppd X 50 years, quit 22 years ago),  Chronic diastolic CHF, Hyperlipidemia, PVD, Iron deficiency anemia, Chronic back pain, Gout, BPH, CKD III with baseline Cr 2. Was ecently admitted to  on  4/17 with anemia of GI bleeding, RLE and RUE DVTs,( received pRBCs and IVC filter discharged to rehab with aspirin) was readmitted to   ER on 5/4/17 for worsening anemia with Hb 6, worsening leg pain from ulcers and worsening renal function Cr 3.99 now presents from [facility] with anemia (7.7 on labs today decreasing from 9.5 over past few weeks). Pt c/o gradual onset  progressive fatigue over the last couple of weeks. Patient is unable to ambulate because of his leg cellulitis and ulcers. He denies any shortness of breath, palpitations / chest pain / light headedness. According to the daughter the attendants at [facility] did notice dark stools for the last couple of days. Patient denies any abdominal pain or change in bowel or urinary habits.  In ED, + guaiac. (17 Jun 2017 00:37)    pt comf  vida po well  neg cp or sob  pos fatigue   dark stool on Fe        PAST MEDICAL & SURGICAL HISTORY:  Sepsis, due to unspecified organism: 2/2 poorly healing wounds b/l  BPH (benign prostatic hypertrophy)  Hyperlipemia  Coronary artery disease  Hypertension  Dyspepsia: On moderate exertion.  Sleep apnea, obstructive: Requires home 02 therapy, and treatment with BIPAP  Atelectasis  Pleural effusion, bilateral  Respiratory failure  Peripheral edema  CRI (chronic renal insufficiency)  Gout  CRF (chronic renal failure)  Benign prostatic hypertrophy  Spinal stenosis  Hypercholesterolemia  GERD (gastroesophageal reflux disease)  CAD (coronary artery disease)  Hypertension  S/P angioplasty with stent  Cataract of left eye  Prostate: Surgery green light procedure.  S/P rotator cuff surgery: Right  S/P angioplasty  Rotator cuff tear, right: repair      MEDICATIONS  (STANDING):  allopurinol 100milliGRAM(s) Oral daily  buDESOnide   0.5 milliGRAM(s) Respule 0.5milliGRAM(s) Inhalation daily  doxazosin 8milliGRAM(s) Oral at bedtime  fenofibrate Tablet 145milliGRAM(s) Oral daily  ferrous    sulfate 325milliGRAM(s) Oral two times a day with meals  gabapentin 300milliGRAM(s) Oral daily  hydrALAZINE 50milliGRAM(s) Oral daily  HYDROmorphone   Tablet 2milliGRAM(s) Oral every 6 hours  isosorbide   mononitrate ER Tablet (IMDUR) 120milliGRAM(s) Oral daily  metoprolol 25milliGRAM(s) Oral daily  pantoprazole  Injectable 40milliGRAM(s) IV Push every 12 hours  pramipexole 0.125milliGRAM(s) Oral daily  senna 2Tablet(s) Oral at bedtime  simvastatin 10milliGRAM(s) Oral at bedtime  multivitamin 1Tablet(s) Oral daily  diltiazem   CD 120milliGRAM(s) Oral daily  heparin  Injectable 5000Unit(s) SubCutaneous every 8 hours  piperacillin/tazobactam IVPB. 3.375Gram(s) IV Intermittent every 8 hours  vancomycin  IVPB 500milliGRAM(s) IV Intermittent every 12 hours  dextrose 5%. 1000milliLiter(s) IV Continuous <Continuous>    MEDICATIONS  (PRN):  acetaminophen   Tablet. 500milliGRAM(s) Oral every 6 hours PRN Mild Pain (1 - 3)  ALBUTerol    90 MICROgram(s) HFA Inhaler 2Puff(s) Inhalation every 6 hours PRN Shortness of Breath  nitroglycerin     SubLingual 0.4milliGRAM(s) SubLingual every 5 minutes PRN Chest Pain      Allergies    No Known Allergies    Intolerances        SOCIAL HISTORY:unchanged    FAMILY HISTORY:  No pertinent family history in first degree relatives      REVIEW OF SYSTEMS:    CONSTITUTIONAL: No weakness, fevers or chills  EYES/ENT: No visual changes;  No vertigo or throat pain   NECK: No pain or stiffness  RESPIRATORY: No cough, wheezing, hemoptysis; No shortness of breath  CARDIOVASCULAR: No chest pain or palpitations  GENITOURINARY: No dysuria, frequency or hematuria  NEUROLOGICAL: No numbness or weakness  SKIN: No itching, burning, rashes, or lesions   All other review of systems is negative unless indicated above.    Vital Signs Last 24 Hrs  T(C): 36.9, Max: 36.9 (06-22 @ 05:42)  T(F): 98.4, Max: 98.4 (06-22 @ 05:42)  HR: 71 (68 - 85)  BP: 143/51 (143/51 - 164/49)  BP(mean): --  RR: 18 (18 - 18)  SpO2: 98% (98% - 99%)    PHYSICAL EXAM:    Constitutional: NAD, well-developed  HEENT: EOMI, throat clear  Neck: No LAD, supple  Respiratory: CTA and P  Cardiovascular: S1 and S2, RRR, no M  Gastrointestinal: BS+, soft, NT/ND, neg HSM,  Extremities: pos peripheral edema, neg clubing, cyanosis,  dressing on LE, R    Neurological: A/O x 3, no focal deficits  Psychiatric: Normal mood, normal affect  Skin: No rashes    LABS:  CBC Full  -  ( 22 Jun 2017 07:02 )  WBC Count : 6.3 K/uL  Hemoglobin : 8.8 g/dL  Hematocrit : 27.9 %  Platelet Count - Automated : 346 K/uL  Mean Cell Volume : 89.7 fl  Mean Cell Hemoglobin : 28.2 pg  Mean Cell Hemoglobin Concentration : 31.4 gm/dL  Auto Neutrophil # : x  Auto Lymphocyte # : x  Auto Monocyte # : x  Auto Eosinophil # : x  Auto Basophil # : x  Auto Neutrophil % : x  Auto Lymphocyte % : x  Auto Monocyte % : x  Auto Eosinophil % : x  Auto Basophil % : x    06-22    146<H>  |  115<H>  |  30<H>  ----------------------------<  94  3.6   |  26  |  1.15    Ca    7.4<L>      22 Jun 2017 07:02              RADIOLOGY & ADDITIONAL STUDIES:

## 2017-06-22 NOTE — PROGRESS NOTE ADULT - SUBJECTIVE AND OBJECTIVE BOX
afebrile, pain negligible; vac in place, dressings in place    MEDICATIONS  (STANDING):  allopurinol 100milliGRAM(s) Oral daily  buDESOnide   0.5 milliGRAM(s) Respule 0.5milliGRAM(s) Inhalation daily  doxazosin 8milliGRAM(s) Oral at bedtime  fenofibrate Tablet 145milliGRAM(s) Oral daily  ferrous    sulfate 325milliGRAM(s) Oral two times a day with meals  gabapentin 300milliGRAM(s) Oral daily  hydrALAZINE 50milliGRAM(s) Oral daily  HYDROmorphone   Tablet 2milliGRAM(s) Oral every 6 hours  isosorbide   mononitrate ER Tablet (IMDUR) 120milliGRAM(s) Oral daily  metoprolol 25milliGRAM(s) Oral daily  pantoprazole  Injectable 40milliGRAM(s) IV Push every 12 hours  pramipexole 0.125milliGRAM(s) Oral daily  senna 2Tablet(s) Oral at bedtime  simvastatin 10milliGRAM(s) Oral at bedtime  multivitamin 1Tablet(s) Oral daily  diltiazem   CD 120milliGRAM(s) Oral daily  heparin  Injectable 5000Unit(s) SubCutaneous every 8 hours  piperacillin/tazobactam IVPB. 3.375Gram(s) IV Intermittent every 8 hours  vancomycin  IVPB 500milliGRAM(s) IV Intermittent every 12 hours  dextrose 5%. 1000milliLiter(s) IV Continuous <Continuous>    MEDICATIONS  (PRN):  acetaminophen   Tablet. 500milliGRAM(s) Oral every 6 hours PRN Mild Pain (1 - 3)  ALBUTerol    90 MICROgram(s) HFA Inhaler 2Puff(s) Inhalation every 6 hours PRN Shortness of Breath  nitroglycerin     SubLingual 0.4milliGRAM(s) SubLingual every 5 minutes PRN Chest Pain      Allergies    No Known Allergies    Intolerances        Flatus: [ ] YES [ ] NO             Bowel Movement: [ ] YES [ ] NO  Pain (0-10):            Pain Control Adequate: [ ] YES [ ] NO  Nausea: [ ] YES [ ] NO            Vomiting: [ ] YES [ ] NO  Diarrhea: [ ] YES [ ] NO         Constipation: [ ] YES [ ] NO     Chest Pain: [ ] YES [ ] NO    SOB:  [ ] YES [ ] NO    Vital Signs Last 24 Hrs  T(C): 36.9, Max: 36.9 (06-22 @ 05:42)  T(F): 98.4, Max: 98.4 (06-22 @ 05:42)  HR: 71 (68 - 85)  BP: 143/51 (143/51 - 164/49)  BP(mean): --  RR: 18 (18 - 18)  SpO2: 98% (98% - 99%)    I&O's Summary      Physical Exam:  General: NAD, resting comfortably  Pulmonary: normal resp effort, CTA-B  Cardiovascular: NSR  Abdominal: soft, NT/ND  Extremities: WWP, normal strength  Neuro: A/O x 3, CNs II-XII grossly intact, normal motor/sensation, no focal deficits  Pulses:   Right:                                                                          Left:    LABS:                        8.8    6.3   )-----------( 346      ( 22 Jun 2017 07:02 )             27.9     06-22    146<H>  |  115<H>  |  30<H>  ----------------------------<  94  3.6   |  26  |  1.15    Ca    7.4<L>      22 Jun 2017 07:02            CAPILLARY BLOOD GLUCOSE      RADIOLOGY & ADDITIONAL TESTS:

## 2017-06-22 NOTE — PROGRESS NOTE ADULT - PROBLEM SELECTOR PLAN 2
- inguinal wound culture shows mixed GNR, EN spp and corynebacterium spp  - on vancomycin 500 mg IV q12h and zosyn 3.375 gm IV q8h # 5  - Wound care as per Vascular Surgery  - S/P right groin wound debridement with wound vac in place  - Podiatry consult appreciated  - Patient may require more debridement and or AKA

## 2017-06-22 NOTE — CONSULT NOTE ADULT - ASSESSMENT
82 year old male with a PMH of CAD s/p stents (LAD, RCA bare metal around 9/16),PVD (RLE stent/ angioplasty and surgical debridment by Dr Siu 5/20 ) A.Fib not on anticoagulation due to GI bleeding, Hypertension, COPD on home O2 2L, SHAYY on nocturnal BIPAP, ex-smoker (smoked 1ppd X 50 years, quit 22 years ago),  Chronic diastolic CHF, Hyperlipidemia, PVD, Iron deficiency anemia, Chronic back pain, Gout, BPH, CKD III with baseline Cr 2.  is seen for    1. Partial tear of Achilles tendon, right; ulcerations with necrotic islands of the right lower leg and foot  -Pt was seen and examined. Plan was discussed with attending Dr. Fermin.  -MRI of RLE reviewed; positive for OM of the posterior calcaneus and partial tear of the Achilles tendon.  -Discussed pt with Dr. Clement; local wound care consisting of xeroform and DSD to right lower leg and foot per Dr. Clement's recommendations, appreciated. Any surgical plan for RLE per Dr. Clement.  -No acute intervention for Achilles partial tear at this time due to the extensive RLE ulcers.  -Recommend offloading B/L heels; order placed for z-flex boots for B/L feet.  -Recommend IVabx per ID, appreciated.  -Podiatry appreciates this consult. Podiatry to follow.    2. Other issues  -Care per Medicine, appreciated.

## 2017-06-22 NOTE — PROGRESS NOTE ADULT - SUBJECTIVE AND OBJECTIVE BOX
Chief Complaint/Reason for Admission: Sent from [facility] rehab because of low hemoglobin 7.7	  History of Present Illness: 	  82 year old male with history of CAD s/p stents (LAD, RCA bare metal around 9/16),PVD (RLE stent/ angioplasty and surgical debridment by Dr Siu 5/20 ) Brianna not on anticoagulation due to GI bleeding, Hypertension, COPD on home O2 2L, SHAYY on nocturnal BIPAP, ex-smoker (smoked 1ppd X 50 years, quit 22 years ago),  Chronic diastolic CHF, Hyperlipidemia, PVD, Iron deficiency anemia, Chronic back pain, Gout, BPH, CKD III with baseline Cr 2. Was ecently admitted to  on  4/17 with anemia of GI bleeding, RLE and RUE DVTs,( received pRBCs and IVC filter discharged to rehab with aspirin) was readmitted to   ER on 5/4/17 for worsening anemia with Hb 6, worsening leg pain from ulcers and worsening renal function Cr 3.99 now presents from [facility] with anemia (7.7 on labs today decreasing from 9.5 over past few weeks). Pt c/o gradual onset  progressive fatigue over the last couple of weeks. Patient is unable to ambulate because of his leg cellulitis and ulcers. He denies any shortness of breath, palpitations / chest pain / light headedness. According to the daughter the attendants at [facility] did notice dark stools for the last couple of days. Patient denies any abdominal pain or change in bowel or urinary habits.  In ED, + guaiac.     6/19 - Patient seen and examined. Reports 6 loose BM since this morning.  Patient has been placed on isolation until Cdiff has been rulled out.  Afebrile. hemodynamically stable.     6/20 - Patient going for muscle flap of groin area tonight after 1u PRBCs for anem.  Hemodynamically stable. Patient had low grade temp overnight.  On Vanco/Zosyn #3.  Tolerating abx well.  No Diarrhea, no rash.     6/21 - Patient seen and examined with daughter at bedside eating breakfast.  S/P right femoral wound debridement Day #1.  Wound vac in place and functional.  patient is feeling well with no complaints at this time. Has elevated BP, Will increased dose of Metoprolol to BID.  Podiatry consult placed to evaluate right achilles tendon rupture repair. Hemodynamically stable, afebrile. On Vanco/Zosyn day #4.    6/22 - Patient seen and examined.  Hemodynamically stable. NAD, Afebrile. On Vanco.Zosyn Day #5.  patient is feeling well.  Has been seen and Dr. Cardenas and may require further debridement and or AKA.  Patient is in pain despite being on Dilaudid standing q6hr. Will add Dilaudid for breakthrough pain.     MEDICATIONS  (STANDING):  allopurinol 100milliGRAM(s) Oral daily  buDESOnide   0.5 milliGRAM(s) Respule 0.5milliGRAM(s) Inhalation daily  doxazosin 8milliGRAM(s) Oral at bedtime  fenofibrate Tablet 145milliGRAM(s) Oral daily  ferrous    sulfate 325milliGRAM(s) Oral two times a day with meals  gabapentin 300milliGRAM(s) Oral daily  hydrALAZINE 50milliGRAM(s) Oral daily  HYDROmorphone   Tablet 2milliGRAM(s) Oral every 6 hours  isosorbide   mononitrate ER Tablet (IMDUR) 120milliGRAM(s) Oral daily  metoprolol 25milliGRAM(s) Oral daily  pantoprazole  Injectable 40milliGRAM(s) IV Push every 12 hours  pramipexole 0.125milliGRAM(s) Oral daily  senna 2Tablet(s) Oral at bedtime  simvastatin 10milliGRAM(s) Oral at bedtime  multivitamin 1Tablet(s) Oral daily  diltiazem   CD 120milliGRAM(s) Oral daily  heparin  Injectable 5000Unit(s) SubCutaneous every 8 hours  piperacillin/tazobactam IVPB. 3.375Gram(s) IV Intermittent every 8 hours  vancomycin  IVPB 500milliGRAM(s) IV Intermittent every 12 hours    MEDICATIONS  (PRN):  acetaminophen   Tablet. 500milliGRAM(s) Oral every 6 hours PRN Mild Pain (1 - 3)  ALBUTerol    90 MICROgram(s) HFA Inhaler 2Puff(s) Inhalation every 6 hours PRN Shortness of Breath  nitroglycerin     SubLingual 0.4milliGRAM(s) SubLingual every 5 minutes PRN Chest Pain  HYDROmorphone  Injectable 0.5milliGRAM(s) IV Push every 6 hours PRN Severe Pain (7 - 10)    ICU Vital Signs Last 24 Hrs  T(C): 36.9, Max: 37.1 (06-22 @ 16:33)  T(F): 98.5, Max: 98.7 (06-22 @ 16:33)  HR: 74 (71 - 85)  BP: 153/48 (143/51 - 155/44)  BP(mean): --  ABP: --  ABP(mean): --  RR: 18 (18 - 18)  SpO2: 97% (96% - 99%)    GEN: NAD, comfortable, resting in bed  CV: S1S2, RRR, no mumur  RESP: good air movement, CTABL, no rales, rhonchi or wheezing  ABD: +BS, soft, ND, NT, no guarding, no rigidity  Skin: R groin wound vac in place and functional; distal leg wounds granulating with scattered areas of eschar, tendon on R lateral leg exposed and necrotic; foot intact; L leg with stasis changes but no cellulitis or ulcers  EXT: +2 radial and pedial pulses, no edema, no calve tenderness                                   8.8    6.3   )-----------( 346      ( 22 Jun 2017 07:02 )             27.9     22 Jun 2017 07:02    146    |  115    |  30     ----------------------------<  94     3.6     |  26     |  1.15     Ca    7.4        22 Jun 2017 07:02          CAPILLARY BLOOD GLUCOSE

## 2017-06-22 NOTE — PROGRESS NOTE ADULT - ASSESSMENT
stable, discussed with daughter Ivis (nurse) re MRI finding of osteo of calcaneus and its poor prognostic indicator for limb salvage and that he would require an AKA given the large skin and SQ defect of lower leg.  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

## 2017-06-22 NOTE — CONSULT NOTE ADULT - SUBJECTIVE AND OBJECTIVE BOX
82 year old male is seen bedside for partial tear of right Achilles tendon. Pt was admitted on 6/17/17 for GI bleed. Pt states he has had chronic ulcers on his right lower leg and foot for the past year and has been treated by Dr. Clement. States he had a right groin wound debrided in the OR with placement of wound VAC on 6/20. States he has a lot of pain in his right lower leg and foot. States he has minimal pain in his left heel. States he is unable to walk due to the extensive wounds on his right lower leg and foot. Denies any f/c/n/v/sob.    PMHx: CAD s/p stents (LAD, RCA bare metal around 9/16),PVD (RLE stent/ angioplasty and surgical debridment by Dr Siu 5/20 ) A.Fib not on anticoagulation due to GI bleeding, Hypertension, COPD on home O2 2L, SHAYY on nocturnal BIPAP, ex-smoker (smoked 1ppd X 50 years, quit 22 years ago),  Chronic diastolic CHF, Hyperlipidemia, PVD, Iron deficiency anemia, Chronic back pain, Gout, BPH, CKD III with baseline Cr 2.    MEDICATIONS:  MEDICATIONS  (STANDING):  allopurinol 100milliGRAM(s) Oral daily  buDESOnide   0.5 milliGRAM(s) Respule 0.5milliGRAM(s) Inhalation daily  doxazosin 8milliGRAM(s) Oral at bedtime  fenofibrate Tablet 145milliGRAM(s) Oral daily  ferrous    sulfate 325milliGRAM(s) Oral two times a day with meals  gabapentin 300milliGRAM(s) Oral daily  hydrALAZINE 50milliGRAM(s) Oral daily  HYDROmorphone   Tablet 2milliGRAM(s) Oral every 6 hours  isosorbide   mononitrate ER Tablet (IMDUR) 120milliGRAM(s) Oral daily  metoprolol 25milliGRAM(s) Oral daily  pantoprazole  Injectable 40milliGRAM(s) IV Push every 12 hours  pramipexole 0.125milliGRAM(s) Oral daily  senna 2Tablet(s) Oral at bedtime  simvastatin 10milliGRAM(s) Oral at bedtime  multivitamin 1Tablet(s) Oral daily  diltiazem   CD 120milliGRAM(s) Oral daily  heparin  Injectable 5000Unit(s) SubCutaneous every 8 hours  piperacillin/tazobactam IVPB. 3.375Gram(s) IV Intermittent every 8 hours  vancomycin  IVPB 500milliGRAM(s) IV Intermittent every 12 hours  dextrose 5%. 1000milliLiter(s) IV Continuous <Continuous>    Allergies: NKFDA    REVIEW OF SYSTEMS:  CONSTITUTIONAL: No weakness, fevers or chills  EYES/ENT: No visual changes;  No vertigo or throat pain   NECK: No pain or stiffness  RESPIRATORY: No cough, wheezing, hemoptysis; No shortness of breath  CARDIOVASCULAR: No chest pain or palpitations  GASTROINTESTINAL: No abdominal or epigastric pain. No nausea, vomiting, or hematemesis; No diarrhea or constipation. No melena or hematochezia.  GENITOURINARY: No dysuria, frequency or hematuria  NEUROLOGICAL: No numbness or weakness  SKIN: Painful chronic ulcers of the right lower leg and foot.  All other review of systems is negative unless indicated above    Vital Signs Last 24 Hrs  T(C): 36.9, Max: 36.9 (06-22 @ 05:42)  T(F): 98.4, Max: 98.4 (06-22 @ 05:42)  HR: 71 (68 - 85)  BP: 143/51 (143/51 - 164/49)  BP(mean): --  RR: 18 (18 - 18)  SpO2: 98% (98% - 99%)      PHYSICAL EXAM:    Constitutional: NAD, awake and alert, well-developed  Extremities:   Vascular- Non palpable DP and PT pulses of the right foot.  Neuro- Protective sensation is decreased.  Derm- Ulcerations noted to be diffusely spread on the dorsal right foot and postero lateral right lower leg. Necrotic, gangrenous areas noted of the lateral right lower leg, right posteroplantar heel, dorsal right foot and anterior right ankle. Necrotic areas of the lateral aspect of right lower leg is deep to the level of muscle. Maceration noted around the right heel wound. Malodor present. No purulence noted. Dry gangrene noted of the dorsal aspects of the right foot toes. Eschar like ulcer noted on the poster plantar left heel. No fluctuance noted of the left foot.  Ortho- Pain upon palpation of the right lower leg and foot. Able to actively lift right lower leg with pain.      LABS: All Labs Reviewed:                        8.8    6.3   )-----------( 346      ( 22 Jun 2017 07:02 )             27.9     06-22    146<H>  |  115<H>  |  30<H>  ----------------------------<  94  3.6   |  26  |  1.15    Ca    7.4<L>      22 Jun 2017 07:02      Culture - Other (06.17.17 @ 12:50)    Specimen Source: .Other right groin wound    Culture Results:   Moderate Mixed gram negative rods  Few Enterococcus species  Rare Corynebacterium species    Culture - Blood (06.17.17 @ 03:54)    Specimen Source: .Blood Blood    Culture Results:   No growth at 5 days.      RADIOLOGY/EKG:  EXAM:  LWR EXT (MRI) RT W O CON                            PROCEDURE DATE:  06/18/2017        INTERPRETATION:  LWR EXT (MRI) RT W O CON dated 6/18/2017 11:50 AM     INDICATION: Pain and swelling    TECHNIQUE: Multi-sequential, multiplanar MRI imaging of the ankle and   hindfoot was performed per standard protocol.     COMPARISON: None available.    FINDINGS: Evaluation of the soft tissues demonstrates a large posterior   skin ulcer measuring approximately 4.5 x 5.2 cm. Deep to this skin ulcer,   there is nonspecific soft tissue swelling and edema. There is tracking   edema from the ulcer to the proximal calcaneus extending towards the   medial aspect of the calcaneus suspicious for small abscess that measures   1.2 x 2.0 x 3.2 cm (series 10, image 13 and series 8, image 10). There is   partial tearing of the lateral aspect of the Achilles tendon which   closely approximates the skin ulcer and may be exposed. There is partial   tearing of the central plantar fascial cord and full-thickness tear of   the lateral plantar fascial cord at its origin on the calcaneus. The   remaining tendons are preserved. There is no tenosynovitis appreciated.    Evaluation of the bone marrow signal demonstrates erosive change   involving the lateral calcaneus. This erosion measures 1.1 cm. There is   abnormal decreased T1 signal with edema in the posterior aspect of the   calcaneus adjacent to the origin compatible with osteomyelitis. There is   a curvilinear focus of decreased T1 signal that measures up to 1.8 cm   (series 7, image six) within the posterior calcaneus suspicious for an   area of osteonecrosis. Patchy appearance of the bone marrow signal   throughout the foot as can be seen in the setting of decreased bone   mineralization. The tibiotalar, posterior subtalar, middle subtalar,   calcaneocuboid, and talonavicular joints are intact. There is relative   preservation of the midfoot joint spaces. No fracture is identified.    There is no soft tissue mass. The neurovascular structures are relatively   preserved.      IMPRESSION:   1.  Large skin ulcer at the posterior aspect of the heel.  2.  Fluid tracking from the skin ulcer towards the medial calcaneus   suspicious for small abscess.  3.  Osteomyelitis of the posterior calcaneus.  4.  Osteonecrosis within the posterior calcaneus.  5.  Partial tearing of the lateral Achilles insertion.  6.  Full-thickness tearing of the lateral cord of the plantar fascia with   partial tear of the central cord.

## 2017-06-23 LAB
ANION GAP SERPL CALC-SCNC: 8 MMOL/L — SIGNIFICANT CHANGE UP (ref 5–17)
BUN SERPL-MCNC: 27 MG/DL — HIGH (ref 7–23)
CALCIUM SERPL-MCNC: 7.5 MG/DL — LOW (ref 8.5–10.1)
CHLORIDE SERPL-SCNC: 112 MMOL/L — HIGH (ref 96–108)
CO2 SERPL-SCNC: 26 MMOL/L — SIGNIFICANT CHANGE UP (ref 22–31)
CREAT SERPL-MCNC: 1.11 MG/DL — SIGNIFICANT CHANGE UP (ref 0.5–1.3)
GLUCOSE SERPL-MCNC: 92 MG/DL — SIGNIFICANT CHANGE UP (ref 70–99)
HCT VFR BLD CALC: 28.9 % — LOW (ref 39–50)
HGB BLD-MCNC: 9 G/DL — LOW (ref 13–17)
MCHC RBC-ENTMCNC: 28.1 PG — SIGNIFICANT CHANGE UP (ref 27–34)
MCHC RBC-ENTMCNC: 31.2 GM/DL — LOW (ref 32–36)
MCV RBC AUTO: 90.1 FL — SIGNIFICANT CHANGE UP (ref 80–100)
PLATELET # BLD AUTO: 334 K/UL — SIGNIFICANT CHANGE UP (ref 150–400)
POTASSIUM SERPL-MCNC: 3.3 MMOL/L — LOW (ref 3.5–5.3)
POTASSIUM SERPL-SCNC: 3.3 MMOL/L — LOW (ref 3.5–5.3)
RBC # BLD: 3.2 M/UL — LOW (ref 4.2–5.8)
RBC # FLD: 15.7 % — HIGH (ref 10.3–14.5)
SODIUM SERPL-SCNC: 146 MMOL/L — HIGH (ref 135–145)
WBC # BLD: 6.1 K/UL — SIGNIFICANT CHANGE UP (ref 3.8–10.5)
WBC # FLD AUTO: 6.1 K/UL — SIGNIFICANT CHANGE UP (ref 3.8–10.5)

## 2017-06-23 RX ORDER — POTASSIUM CHLORIDE 20 MEQ
40 PACKET (EA) ORAL ONCE
Qty: 0 | Refills: 0 | Status: COMPLETED | OUTPATIENT
Start: 2017-06-23 | End: 2017-06-23

## 2017-06-23 RX ORDER — SOD,AMMONIUM,POTASSIUM LACTATE
1 CREAM (GRAM) TOPICAL DAILY
Qty: 0 | Refills: 0 | Status: DISCONTINUED | OUTPATIENT
Start: 2017-06-23 | End: 2017-06-27

## 2017-06-23 RX ADMIN — PANTOPRAZOLE SODIUM 40 MILLIGRAM(S): 20 TABLET, DELAYED RELEASE ORAL at 06:38

## 2017-06-23 RX ADMIN — HYDROMORPHONE HYDROCHLORIDE 2 MILLIGRAM(S): 2 INJECTION INTRAMUSCULAR; INTRAVENOUS; SUBCUTANEOUS at 11:29

## 2017-06-23 RX ADMIN — Medication 100 MILLIGRAM(S): at 19:34

## 2017-06-23 RX ADMIN — SIMVASTATIN 10 MILLIGRAM(S): 20 TABLET, FILM COATED ORAL at 21:27

## 2017-06-23 RX ADMIN — Medication 0.5 MILLIGRAM(S): at 08:00

## 2017-06-23 RX ADMIN — PIPERACILLIN AND TAZOBACTAM 25 GRAM(S): 4; .5 INJECTION, POWDER, LYOPHILIZED, FOR SOLUTION INTRAVENOUS at 15:06

## 2017-06-23 RX ADMIN — PIPERACILLIN AND TAZOBACTAM 25 GRAM(S): 4; .5 INJECTION, POWDER, LYOPHILIZED, FOR SOLUTION INTRAVENOUS at 21:18

## 2017-06-23 RX ADMIN — Medication 40 MILLIEQUIVALENT(S): at 09:32

## 2017-06-23 RX ADMIN — HEPARIN SODIUM 5000 UNIT(S): 5000 INJECTION INTRAVENOUS; SUBCUTANEOUS at 06:38

## 2017-06-23 RX ADMIN — PIPERACILLIN AND TAZOBACTAM 25 GRAM(S): 4; .5 INJECTION, POWDER, LYOPHILIZED, FOR SOLUTION INTRAVENOUS at 06:37

## 2017-06-23 RX ADMIN — Medication 120 MILLIGRAM(S): at 06:37

## 2017-06-23 RX ADMIN — Medication 8 MILLIGRAM(S): at 21:42

## 2017-06-23 RX ADMIN — HEPARIN SODIUM 5000 UNIT(S): 5000 INJECTION INTRAVENOUS; SUBCUTANEOUS at 15:06

## 2017-06-23 RX ADMIN — Medication 1 APPLICATION(S): at 11:30

## 2017-06-23 RX ADMIN — HYDROMORPHONE HYDROCHLORIDE 2 MILLIGRAM(S): 2 INJECTION INTRAMUSCULAR; INTRAVENOUS; SUBCUTANEOUS at 19:34

## 2017-06-23 RX ADMIN — ISOSORBIDE MONONITRATE 120 MILLIGRAM(S): 60 TABLET, EXTENDED RELEASE ORAL at 11:27

## 2017-06-23 RX ADMIN — Medication 325 MILLIGRAM(S): at 19:34

## 2017-06-23 RX ADMIN — Medication 145 MILLIGRAM(S): at 11:26

## 2017-06-23 RX ADMIN — Medication 325 MILLIGRAM(S): at 09:30

## 2017-06-23 RX ADMIN — Medication 100 MILLIGRAM(S): at 11:26

## 2017-06-23 RX ADMIN — Medication 25 MILLIGRAM(S): at 06:38

## 2017-06-23 RX ADMIN — HEPARIN SODIUM 5000 UNIT(S): 5000 INJECTION INTRAVENOUS; SUBCUTANEOUS at 21:27

## 2017-06-23 RX ADMIN — HYDROMORPHONE HYDROCHLORIDE 2 MILLIGRAM(S): 2 INJECTION INTRAMUSCULAR; INTRAVENOUS; SUBCUTANEOUS at 06:37

## 2017-06-23 RX ADMIN — Medication 1 TABLET(S): at 11:29

## 2017-06-23 RX ADMIN — Medication 50 MILLIGRAM(S): at 06:37

## 2017-06-23 RX ADMIN — PRAMIPEXOLE DIHYDROCHLORIDE 0.12 MILLIGRAM(S): 0.12 TABLET ORAL at 11:27

## 2017-06-23 RX ADMIN — GABAPENTIN 300 MILLIGRAM(S): 400 CAPSULE ORAL at 11:26

## 2017-06-23 RX ADMIN — PANTOPRAZOLE SODIUM 40 MILLIGRAM(S): 20 TABLET, DELAYED RELEASE ORAL at 19:34

## 2017-06-23 RX ADMIN — Medication 100 MILLIGRAM(S): at 06:37

## 2017-06-23 NOTE — PROGRESS NOTE ADULT - SUBJECTIVE AND OBJECTIVE BOX
82 year old male is seen bedside for followup of RLE necrotic wounds and partial tear of right Achilles tendon. Pt was admitted on 6/17/17 for GI bleed. Pt states he has had chronic ulcers on his right lower leg and foot for the past year and has been treated by Dr. Clement. States he had a right groin wound debrided in the OR with placement of wound VAC on 6/20. States the pain in his right lower leg and foot has decreased. States he has minimal pain in his left heel. States he is unable to walk due to the extensive wounds on his right lower leg and foot. Denies any f/c/n/v/sob.    PMHx: CAD s/p stents (LAD, RCA bare metal around 9/16),PVD (RLE stent/ angioplasty and surgical debridment by Dr Siu 5/20 ) A.Fib not on anticoagulation due to GI bleeding, Hypertension, COPD on home O2 2L, SHAYY on nocturnal BIPAP, ex-smoker (smoked 1ppd X 50 years, quit 22 years ago),  Chronic diastolic CHF, Hyperlipidemia, PVD, Iron deficiency anemia, Chronic back pain, Gout, BPH, CKD III with baseline Cr 2.    MEDICATIONS:  MEDICATIONS  (STANDING):  allopurinol 100milliGRAM(s) Oral daily  buDESOnide   0.5 milliGRAM(s) Respule 0.5milliGRAM(s) Inhalation daily  doxazosin 8milliGRAM(s) Oral at bedtime  fenofibrate Tablet 145milliGRAM(s) Oral daily  ferrous    sulfate 325milliGRAM(s) Oral two times a day with meals  gabapentin 300milliGRAM(s) Oral daily  hydrALAZINE 50milliGRAM(s) Oral daily  HYDROmorphone   Tablet 2milliGRAM(s) Oral every 6 hours  isosorbide   mononitrate ER Tablet (IMDUR) 120milliGRAM(s) Oral daily  metoprolol 25milliGRAM(s) Oral daily  pantoprazole  Injectable 40milliGRAM(s) IV Push every 12 hours  pramipexole 0.125milliGRAM(s) Oral daily  senna 2Tablet(s) Oral at bedtime  simvastatin 10milliGRAM(s) Oral at bedtime  multivitamin 1Tablet(s) Oral daily  diltiazem   CD 120milliGRAM(s) Oral daily  heparin  Injectable 5000Unit(s) SubCutaneous every 8 hours  piperacillin/tazobactam IVPB. 3.375Gram(s) IV Intermittent every 8 hours  vancomycin  IVPB 500milliGRAM(s) IV Intermittent every 12 hours    Allergies: NKFDA    Vital Signs Last 24 Hrs  T(C): 36.6, Max: 37.1 (06-22 @ 16:33)  T(F): 97.9, Max: 98.7 (06-22 @ 16:33)  HR: 79 (74 - 88)  BP: 169/57 (148/53 - 169/57)  BP(mean): --  RR: 18 (18 - 18)  SpO2: 97% (96% - 97%)    I&O's Summary    I & Os for current day (as of 23 Jun 2017 10:48)  =============================================  IN: 0 ml / OUT: 80 ml / NET: -80 ml      PHYSICAL EXAM:  Constitutional: NAD, awake and alert, well-developed  Extremities:   Vascular- Non palpable DP and PT pulses of the right foot.  Neuro- Protective sensation is decreased.  Derm- Ulcerations noted to be diffusely spread on the dorsal right foot and postero lateral right lower leg. Necrotic, gangrenous areas noted of the lateral right lower leg, right posteroplantar heel, dorsal right foot and anterior right ankle. Necrotic areas of the lateral aspect of right lower leg is deep to the level of muscle. Maceration noted around the right heel wound. Malodor present. No purulence noted. Dry gangrene noted of the dorsal aspects of the right foot toes. Eschar like ulcer noted on the poster plantar left heel. No fluctuance noted of the left foot.  Ortho- Pain upon palpation of the right lower leg and foot. Able to actively lift right lower leg with pain.    LABS: All Labs Reviewed:                        9.0    6.1   )-----------( 334      ( 23 Jun 2017 07:18 )             28.9     06-23    146<H>  |  112<H>  |  27<H>  ----------------------------<  92  3.3<L>   |  26  |  1.11    Ca    7.5<L>      23 Jun 2017 07:18        Blood Culture:   Culture - Blood (06.17.17 @ 03:54)    Specimen Source: .Blood Blood    Culture Results:   No growth at 5 days.      RADIOLOGY/EKG:  EXAM:  LWR EXT (MRI) RT W O CON                            PROCEDURE DATE:  06/18/2017        INTERPRETATION:  LWR EXT (MRI) RT W O CON dated 6/18/2017 11:50 AM     INDICATION: Pain and swelling    TECHNIQUE: Multi-sequential, multiplanar MRI imaging of the ankle and   hindfoot was performed per standard protocol.     COMPARISON: None available.    FINDINGS: Evaluation of the soft tissues demonstrates a large posterior   skin ulcer measuring approximately 4.5 x 5.2 cm. Deep to this skin ulcer,   there is nonspecific soft tissue swelling and edema. There is tracking   edema from the ulcer to the proximal calcaneus extending towards the   medial aspect of the calcaneus suspicious for small abscess that measures   1.2 x 2.0 x 3.2 cm (series 10, image 13 and series 8, image 10). There is   partial tearing of the lateral aspect of the Achilles tendon which   closely approximates the skin ulcer and may be exposed. There is partial   tearing of the central plantar fascial cord and full-thickness tear of   the lateral plantar fascial cord at its origin on the calcaneus. The   remaining tendons are preserved. There is no tenosynovitis appreciated.    Evaluation of the bone marrow signal demonstrates erosive change   involving the lateral calcaneus. This erosion measures 1.1 cm. There is   abnormal decreased T1 signal with edema in the posterior aspect of the   calcaneus adjacent to the origin compatible with osteomyelitis. There is   a curvilinear focus of decreased T1 signal that measures up to 1.8 cm   (series 7, image six) within the posterior calcaneus suspicious for an   area of osteonecrosis. Patchy appearance of the bone marrow signal   throughout the foot as can be seen in the setting of decreased bone   mineralization. The tibiotalar, posterior subtalar, middle subtalar,   calcaneocuboid, and talonavicular joints are intact. There is relative   preservation of the midfoot joint spaces. No fracture is identified.    There is no soft tissue mass. The neurovascular structures are relatively   preserved.      IMPRESSION:   1.  Large skin ulcer at the posterior aspect of the heel.  2.  Fluid tracking from the skin ulcer towards the medial calcaneus   suspicious for small abscess.  3.  Osteomyelitis of the posterior calcaneus.  4.  Osteonecrosis within the posterior calcaneus.  5.  Partial tearing of the lateral Achilles insertion.  6.  Full-thickness tearing of the lateral cord of the plantar fascia with   partial tear of the central cord.

## 2017-06-23 NOTE — PROGRESS NOTE ADULT - ASSESSMENT
82 year old male with a PMH of CAD s/p stents (LAD, RCA bare metal around 9/16),PVD (RLE stent/ angioplasty and surgical debridment by Dr Siu 5/20 ) A.Fib not on anticoagulation due to GI bleeding, Hypertension, COPD on home O2 2L, SHAYY on nocturnal BIPAP, ex-smoker (smoked 1ppd X 50 years, quit 22 years ago),  Chronic diastolic CHF, Hyperlipidemia, PVD, Iron deficiency anemia, Chronic back pain, Gout, BPH, CKD III with baseline Cr 2.  is seen for    1. Partial tear of Achilles tendon, right; mixed necrotic granular ulcerations of the right lower leg and foot  -Pt was seen and examined. Plan was discussed with attending Dr. Fermin.  -MRI of RLE reviewed; positive for OM of the posterior calcaneus and partial tear of the Achilles tendon.  -Discussed pt with Dr. Clement; local wound care consisting of betadine to necrotic right heel, xeroform and DSD to right lower leg and foot and betadine to left heel with allevyn pad per Dr. Clement's recommendations, appreciated. Any surgical plan for RLE per Dr. Clement.  -No acute intervention for Achilles partial tear at this time due to the extensive RLE ulcers.  -Continue use of B/L zflex boots to offload B/L heels. Z-flex boots reapplied.  -Recommend IVabx per ID, appreciated.  -Podiatry to follow.    2. Other issues  -Care per Medicine, appreciated.

## 2017-06-23 NOTE — PHYSICAL THERAPY INITIAL EVALUATION ADULT - PATIENT PROFILE REVIEW, REHAB EVAL
per Vascular surgeon Dr KELVIN Clement pt is candidate for R AKA in  view of MRI finding + OM calcaneous/yes current activity level of record is BEDREST 6/20; per Vascular surgeon Dr KELVIN Clement pt is candidate for R AKA in  view of MRI finding + OM calcaneous/yes

## 2017-06-23 NOTE — PROGRESS NOTE ADULT - ASSESSMENT
Anemia–likely due to anemia of chronic disease and contributing factor possibly from GI source as well.  Would follow serial H&H's.  SP PRBC for surg and hct inc, and dec likely post op but will follow HCT  His hematocrit appears to be stable in the hospital. I had discussion with the patient and his daughter. For now, we will hold off on endoscopy as he is at high risk. However, if there is plan for vascular intervention and increasing   antiplatelet agents, we may want cross the bridge of endoscopic evaluation again.  restarted iron and vida      CAD: ASA is on hold and will reconsider starting after HCT stable, risks DW pt and he is thinking it over still      Would clinically follow and ascertain for evidence of significant bleeding. Aspirin is on hold at this time.    Would continue proton pump inhibitor twice a day for now.

## 2017-06-23 NOTE — PROGRESS NOTE ADULT - ASSESSMENT
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 He has gradual onset  progressive fatigue over the last couple of weeks. Patient is unable to ambulate because of his leg ulcers. In ER, he was started on vancomycin IV and zosyn.    1. Right inguinal open wound with probable infection with mixed GNR and EN spp. Right heel ulcer with underlying chronic OM.  -stronger  -inguinal wound culture shows mixed GNR, EN spp and corynebacterium spp  -on vancomycin 500 mg IV q12h and zosyn 3.375 gm IV q8h # 6  -tolerating abx well so far; no side effects noted  -vancomycin trough level is therapeutic  -podiatry evaluation appreciated  -BKA is considered  -local wound care per surgery  -continue IV abx coverage for now  -monitor temps  -f/u CBC  -supportive care  2. Other issues: CAD s/p stents, PVD, A.Paul not on anticoagulation due to GI bleeding, Hypertension, COPD, SHAYY on nocturnal BIPAP, Chronic diastolic CHF, Hyperlipidemia, PVD, Iron deficiency anemia, Chronic back pain, Gout, BPH, CKD III   -care per medicine

## 2017-06-23 NOTE — PHYSICAL THERAPY INITIAL EVALUATION ADULT - DID THE PATIENT HAVE SURGERY?
pt underwent debridement infected dehisced R groin wound ;on last admission pt underwent angioplasty R common iliac a.,external iliac aa.,thromboendarterectomy R CFA plaque for RLE limb salvage due to severe PAD; subsequently underwent excisional debridement R leg & foot gangrenous eschar on 5/20/17/yes

## 2017-06-23 NOTE — PHYSICAL THERAPY INITIAL EVALUATION ADULT - DIAGNOSIS, PT EVAL
inability to ambulate,partial tear R Achilles tendon, ,+OM R calcaneous,necrotic heel ulcers ,severe PAD RLE with ischemic R foot s/p angioplasty 5/4/17 via R groin wound

## 2017-06-23 NOTE — PHYSICAL THERAPY INITIAL EVALUATION ADULT - PATIENT/FAMILY/SIGNIFICANT OTHER GOALS STATEMENT, PT EVAL
pt had attended [facility] x3 weeks ,was pleased with PT but nursing care at night was dis-satisfactory; has been attending [facility] more recently and had been standing with PT per dtr Lore

## 2017-06-23 NOTE — PROGRESS NOTE ADULT - PROBLEM SELECTOR PLAN 2
- inguinal wound culture shows mixed GNR, EN spp and corynebacterium spp  - on vancomycin 500 mg IV q12h and zosyn 3.375 gm IV q8h # 6  - Wound care as per Vascular Surgery  - S/P right groin wound debridement with wound vac in place  - Patient will require extensive debridement or AKA  - Discussed both options and long term goals with patient and daughter.  They are leaning towards AKA at this time.  Recommended discussing further with Dr. Clement.

## 2017-06-23 NOTE — PROGRESS NOTE ADULT - SUBJECTIVE AND OBJECTIVE BOX
Patient is a 82y old  Male who presents with a chief complaint of Sent from [facility] rehab because of low hemoglobin 7.7 (17 Jun 2017 00:37)      HPI:  82 year old male with history of CAD s/p stents (LAD, RCA bare metal around 9/16),PVD (RLE stent/ angioplasty and surgical debridment by Dr Siu 5/20 ) A.Fib not on anticoagulation due to GI bleeding, Hypertension, COPD on home O2 2L, SHAYY on nocturnal BIPAP, ex-smoker (smoked 1ppd X 50 years, quit 22 years ago),  Chronic diastolic CHF, Hyperlipidemia, PVD, Iron deficiency anemia, Chronic back pain, Gout, BPH, CKD III with baseline Cr 2. Was ecently admitted to  on  4/17 with anemia of GI bleeding, RLE and RUE DVTs,( received pRBCs and IVC filter discharged to rehab with aspirin) was readmitted to   ER on 5/4/17 for worsening anemia with Hb 6, worsening leg pain from ulcers and worsening renal function Cr 3.99 now presents from [facility] with anemia (7.7 on labs today decreasing from 9.5 over past few weeks). Pt c/o gradual onset  progressive fatigue over the last couple of weeks. Patient is unable to ambulate because of his leg cellulitis and ulcers. He denies any shortness of breath, palpitations / chest pain / light headedness. According to the daughter the attendants at [facility] did notice dark stools for the last couple of days. Patient denies any abdominal pain or change in bowel or urinary habits.  In ED, + guaiac. (17 Jun 2017 00:37)    pt is comf and vida diet  DW Dr Clement  neg cp  mild N after eating but resolved  denies abd pain      PAST MEDICAL & SURGICAL HISTORY:  Sepsis, due to unspecified organism: 2/2 poorly healing wounds b/l  BPH (benign prostatic hypertrophy)  Hyperlipemia  Coronary artery disease  Hypertension  Dyspepsia: On moderate exertion.  Sleep apnea, obstructive: Requires home 02 therapy, and treatment with BIPAP  Atelectasis  Pleural effusion, bilateral  Respiratory failure  Peripheral edema  CRI (chronic renal insufficiency)  Gout  CRF (chronic renal failure)  Benign prostatic hypertrophy  Spinal stenosis  Hypercholesterolemia  GERD (gastroesophageal reflux disease)  CAD (coronary artery disease)  Hypertension  S/P angioplasty with stent  Cataract of left eye  Prostate: Surgery green light procedure.  S/P rotator cuff surgery: Right  S/P angioplasty  Rotator cuff tear, right: repair      MEDICATIONS  (STANDING):  allopurinol 100milliGRAM(s) Oral daily  buDESOnide   0.5 milliGRAM(s) Respule 0.5milliGRAM(s) Inhalation daily  doxazosin 8milliGRAM(s) Oral at bedtime  fenofibrate Tablet 145milliGRAM(s) Oral daily  ferrous    sulfate 325milliGRAM(s) Oral two times a day with meals  gabapentin 300milliGRAM(s) Oral daily  hydrALAZINE 50milliGRAM(s) Oral daily  HYDROmorphone   Tablet 2milliGRAM(s) Oral every 6 hours  isosorbide   mononitrate ER Tablet (IMDUR) 120milliGRAM(s) Oral daily  metoprolol 25milliGRAM(s) Oral daily  pantoprazole  Injectable 40milliGRAM(s) IV Push every 12 hours  pramipexole 0.125milliGRAM(s) Oral daily  senna 2Tablet(s) Oral at bedtime  simvastatin 10milliGRAM(s) Oral at bedtime  multivitamin 1Tablet(s) Oral daily  diltiazem   CD 120milliGRAM(s) Oral daily  heparin  Injectable 5000Unit(s) SubCutaneous every 8 hours  piperacillin/tazobactam IVPB. 3.375Gram(s) IV Intermittent every 8 hours  vancomycin  IVPB 500milliGRAM(s) IV Intermittent every 12 hours    MEDICATIONS  (PRN):  acetaminophen   Tablet. 500milliGRAM(s) Oral every 6 hours PRN Mild Pain (1 - 3)  ALBUTerol    90 MICROgram(s) HFA Inhaler 2Puff(s) Inhalation every 6 hours PRN Shortness of Breath  nitroglycerin     SubLingual 0.4milliGRAM(s) SubLingual every 5 minutes PRN Chest Pain  HYDROmorphone  Injectable 0.5milliGRAM(s) IV Push every 6 hours PRN Severe Pain (7 - 10)      Allergies    No Known Allergies    Intolerances        SOCIAL HISTORY:unchanged    FAMILY HISTORY:  No pertinent family history in first degree relatives      REVIEW OF SYSTEMS:    CONSTITUTIONAL: No weakness, fevers or chills  EYES/ENT: No visual changes;  No vertigo or throat pain   NECK: No pain or stiffness  RESPIRATORY: No cough, wheezing, hemoptysis; No shortness of breath  CARDIOVASCULAR: No chest pain or palpitations  GENITOURINARY: No dysuria, frequency or hematuria  NEUROLOGICAL: No numbness or weakness    All other review of systems is negative unless indicated above.    Vital Signs Last 24 Hrs  T(C): 36.6, Max: 37.1 (06-22 @ 16:33)  T(F): 97.9, Max: 98.7 (06-22 @ 16:33)  HR: 88 (74 - 88)  BP: 169/57 (148/53 - 169/57)  BP(mean): --  RR: 18 (18 - 18)  SpO2: 97% (96% - 97%)    PHYSICAL EXAM:    Constitutional: NAD, well-developed  HEENT: EOMI, throat clear  Neck: No LAD, supple  Respiratory: CTA and P  Cardiovascular: S1 and S2, RRR, no M  Gastrointestinal: BS+, soft, NT/ND, neg HSM,  Extremities:pos peripheral edema, neg clubing, cyanosis    Neurological: A/O x 3, no focal deficits  Psychiatric: Normal mood, normal affect  Skin: No rashes    LABS:  CBC Full  -  ( 23 Jun 2017 07:18 )  WBC Count : 6.1 K/uL  Hemoglobin : 9.0 g/dL  Hematocrit : 28.9 %  Platelet Count - Automated : 334 K/uL  Mean Cell Volume : 90.1 fl  Mean Cell Hemoglobin : 28.1 pg  Mean Cell Hemoglobin Concentration : 31.2 gm/dL  Auto Neutrophil # : x  Auto Lymphocyte # : x  Auto Monocyte # : x  Auto Eosinophil # : x  Auto Basophil # : x  Auto Neutrophil % : x  Auto Lymphocyte % : x  Auto Monocyte % : x  Auto Eosinophil % : x  Auto Basophil % : x    06-22    146<H>  |  115<H>  |  30<H>  ----------------------------<  94  3.6   |  26  |  1.15    Ca    7.4<L>      22 Jun 2017 07:02              RADIOLOGY & ADDITIONAL STUDIES:

## 2017-06-23 NOTE — PHYSICAL THERAPY INITIAL EVALUATION ADULT - MARITAL STATUS
/wife Mendy  approximately 1 yr ago ,pt found her dead at home after 60 yrs of marriage and tears up talking about her

## 2017-06-23 NOTE — PROGRESS NOTE ADULT - SUBJECTIVE AND OBJECTIVE BOX
Patient is a 82y old  Male who presents with a chief complaint of Sent from [facility] because of low hemoglobin 7.7 (2017 00:37)    HPI:  83 y/o male with h/o CAD s/p stents (LAD, RCA bare metal around ), PVD (RLE stent/ angioplasty) complicated with poorly healing right inguinal wound s/p prior surgical debridment (by Dr Siu  ) A.Fib not on anticoagulation due to GI bleeding, Hypertension, COPD on home O2 2L, SHAYY on nocturnal BIPAP, Chronic diastolic CHF, Hyperlipidemia, PVD, Iron deficiency anemia, Chronic back pain, Gout, BPH, CKD III with baseline Cr 2, 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 He has gradual onset  progressive fatigue over the last couple of weeks. Patient is unable to ambulate because of his leg ulcers. In ER, he was started on vancomycin IV and zosyn.    Lying in bed in NAD  No fever or chills  Denies pain  Has right groin wound VAC    MEDICATIONS  (STANDING):  allopurinol 100milliGRAM(s) Oral daily  buDESOnide   0.5 milliGRAM(s) Respule 0.5milliGRAM(s) Inhalation daily  doxazosin 8milliGRAM(s) Oral at bedtime  fenofibrate Tablet 145milliGRAM(s) Oral daily  ferrous    sulfate 325milliGRAM(s) Oral two times a day with meals  gabapentin 300milliGRAM(s) Oral daily  hydrALAZINE 50milliGRAM(s) Oral daily  HYDROmorphone   Tablet 2milliGRAM(s) Oral every 6 hours  isosorbide   mononitrate ER Tablet (IMDUR) 120milliGRAM(s) Oral daily  metoprolol 25milliGRAM(s) Oral daily  pantoprazole  Injectable 40milliGRAM(s) IV Push every 12 hours  pramipexole 0.125milliGRAM(s) Oral daily  senna 2Tablet(s) Oral at bedtime  simvastatin 10milliGRAM(s) Oral at bedtime  multivitamin 1Tablet(s) Oral daily  diltiazem   CD 120milliGRAM(s) Oral daily  heparin  Injectable 5000Unit(s) SubCutaneous every 8 hours  piperacillin/tazobactam IVPB. 3.375Gram(s) IV Intermittent every 8 hours  vancomycin  IVPB 500milliGRAM(s) IV Intermittent every 12 hours  ammonium lactate 12% Lotion 1Application(s) Topical daily    MEDICATIONS  (PRN):  acetaminophen   Tablet. 500milliGRAM(s) Oral every 6 hours PRN Mild Pain (1 - 3)  ALBUTerol    90 MICROgram(s) HFA Inhaler 2Puff(s) Inhalation every 6 hours PRN Shortness of Breath  nitroglycerin     SubLingual 0.4milliGRAM(s) SubLingual every 5 minutes PRN Chest Pain  HYDROmorphone  Injectable 0.5milliGRAM(s) IV Push every 6 hours PRN Severe Pain (7 - 10)      Vital Signs Last 24 Hrs  T(C): 36.8, Max: 37.1 ( @ 16:33)  T(F): 98.3, Max: 98.7 ( @ 16:33)  HR: 77 (74 - 88)  BP: 143/47 (143/47 - 169/57)  BP(mean): --  RR: 19 (18 - 19)  SpO2: 97% (97% - 97%)    Physical Exam:        Constitutional: frail looking  HEENT: NC/AT, EOMI, PERRLA  Neck: supple  Back: no tenderness  Respiratory: decreased BS at bases  Cardiovascular: S1S2 regular, no murmurs  Abdomen: soft, not tender, not distended, positive BS  Genitourinary: right inguinal open wound with VAC in place  Rectal: deferred  Musculoskeletal: no muscle tenderness, no joint swelling or tenderness  Extremities: 1+ pedal edema; right inguinal open wound - deep; scant discharge; right heel ulcer  Neurological: AxOx3, moving all extremities, no focal deficits  Skin: no rashes    Labs:                        10.2   8.0   )-----------( 366      ( 2017 06:22 )             32.7     06-21    148<H>  |  115<H>  |  29<H>  ----------------------------<  133<H>  3.8   |  23  |  1.29    Ca    7.7<L>      2017 06:22                          8.3    6.5   )-----------( 367      ( 2017 06:25 )             25.6     06-20    146<H>  |  113<H>  |  32<H>  ----------------------------<  118<H>  3.2<L>   |  27  |  1.33<H>    Ca    7.9<L>      2017 06:25         Vancomycin Level, Trough: 14.2 ug/mL ( @ 05:00)      Cultures:                       8.1    7.0   )-----------( 331      ( 2017 05:00 )             25.2         151<H>  |  116<H>  |  40<H>  ----------------------------<  97  3.6   |  27  |  1.38<H>    Ca    8.1<L>      2017 05:00         Vancomycin Level, Trough: 14.2 ug/mL ( @ 05:00)                          8.2    8.5   )-----------( 312      ( 2017 03:54 )             25.0         148<H>  |  114<H>  |  53<H>  ----------------------------<  129<H>  3.8   |  28  |  1.65<H>    Ca    8.2<L>      2017 03:54  Phos  1.9       Mg     2.1         TPro  5.2<L>  /  Alb  1.7<L>  /  TBili  0.3  /  DBili  x   /  AST  20  /  ALT  12  /  AlkPhos  57  17     LIVER FUNCTIONS - ( 2017 03:54 )  Alb: 1.7 g/dL / Pro: 5.2 gm/dL / ALK PHOS: 57 U/L / ALT: 12 U/L / AST: 20 U/L / GGT: x           Urinalysis Basic - ( 2017 10:36 )    Color: Yellow / Appearance: Clear / S.010 / pH: x  Gluc: x / Ketone: Negative  / Bili: Negative / Urobili: Negative mg/dL   Blood: x / Protein: 15 mg/dL / Nitrite: Negative   Culture - Other (17 @ 12:50)    Specimen Source: .Other right groin wound    Culture Results:   Moderate Mixed gram negative rods  Few Enterococcus species  Rare Corynebacterium species    Leuk Esterase: Trace / RBC: x / WBC 0-2   Sq Epi: x / Non Sq Epi: x / Bacteria: x    Culture - Blood (17 @ 03:54)    Specimen Source: .Blood Blood    Culture Results:   No growth to date.    Culture - Other (17 @ 12:50)    Specimen Source: .Other right groin wound    Culture Results:   Moderate Mixed gram negative rods  Few Enterococcus species          Radiology:    Right foot MRI:  1.  Large skin ulcer at the posterior aspect of the heel.  2.  Fluid tracking from the skin ulcer towards the medial calcaneus   suspicious for small abscess.  3.  Osteomyelitis of the posterior calcaneus.  4.  Osteonecrosis within the posterior calcaneus.  5.  Partial tearing of the lateral Achilles insertion.  6.  Full-thickness tearing of the lateral cord of the plantar fascia with   partial tear of the central cord.    Advanced directives addressed: full resuscitation

## 2017-06-23 NOTE — PHYSICAL THERAPY INITIAL EVALUATION ADULT - LIVES WITH, PROFILE
alone/can stay on 1st level of home ,does not need to use 2nd floor,has  3 steps to enter dwelling with B handrails to enter

## 2017-06-23 NOTE — PROGRESS NOTE ADULT - SUBJECTIVE AND OBJECTIVE BOX
Chief Complaint/Reason for Admission: Sent from [facility] rehab because of low hemoglobin 7.7	  History of Present Illness: 	  82 year old male with history of CAD s/p stents (LAD, RCA bare metal around 9/16),PVD (RLE stent/ angioplasty and surgical debridment by Dr Siu 5/20 ) Brianna not on anticoagulation due to GI bleeding, Hypertension, COPD on home O2 2L, SHAYY on nocturnal BIPAP, ex-smoker (smoked 1ppd X 50 years, quit 22 years ago),  Chronic diastolic CHF, Hyperlipidemia, PVD, Iron deficiency anemia, Chronic back pain, Gout, BPH, CKD III with baseline Cr 2. Was ecently admitted to  on  4/17 with anemia of GI bleeding, RLE and RUE DVTs,( received pRBCs and IVC filter discharged to rehab with aspirin) was readmitted to   ER on 5/4/17 for worsening anemia with Hb 6, worsening leg pain from ulcers and worsening renal function Cr 3.99 now presents from [facility] with anemia (7.7 on labs today decreasing from 9.5 over past few weeks). Pt c/o gradual onset  progressive fatigue over the last couple of weeks. Patient is unable to ambulate because of his leg cellulitis and ulcers. He denies any shortness of breath, palpitations / chest pain / light headedness. According to the daughter the attendants at [facility] did notice dark stools for the last couple of days. Patient denies any abdominal pain or change in bowel or urinary habits.  In ED, + guaiac.     6/19 - Patient seen and examined. Reports 6 loose BM since this morning.  Patient has been placed on isolation until Cdiff has been rulled out.  Afebrile. hemodynamically stable.     6/20 - Patient going for muscle flap of groin area tonight after 1u PRBCs for anem.  Hemodynamically stable. Patient had low grade temp overnight.  On Vanco/Zosyn #3.  Tolerating abx well.  No Diarrhea, no rash.     6/21 - Patient seen and examined with daughter at bedside eating breakfast.  S/P right femoral wound debridement Day #1.  Wound vac in place and functional.  patient is feeling well with no complaints at this time. Has elevated BP, Will increased dose of Metoprolol to BID.  Podiatry consult placed to evaluate right achilles tendon rupture repair. Hemodynamically stable, afebrile. On Vanco/Zosyn day #4.    6/22 - Patient seen and examined.  Hemodynamically stable. NAD, Afebrile. On Vanco.Zosyn Day #5.  patient is feeling well.  Has been seen and Dr. Cardenas and may require further debridement and or AKA.  Patient is in pain despite being on Dilaudid standing q6hr. Will add Dilaudid for breakthrough pain.     6/23 - Patient seen and examined with daughter at bedside.  Had a long conversation with patient and daughter about long term goals of care.  Wound debridement vs AKA, Prosthetics, Physical therapy.  Patient is very deconditioned from chronic right LE wounds.  Patient would most likely benifit from AKA in the long term.  Hemodynamically stable.  afebrile. No overnight events.  Tolerating abx well.     MEDICATIONS  (STANDING):  allopurinol 100milliGRAM(s) Oral daily  buDESOnide   0.5 milliGRAM(s) Respule 0.5milliGRAM(s) Inhalation daily  doxazosin 8milliGRAM(s) Oral at bedtime  fenofibrate Tablet 145milliGRAM(s) Oral daily  ferrous    sulfate 325milliGRAM(s) Oral two times a day with meals  gabapentin 300milliGRAM(s) Oral daily  hydrALAZINE 50milliGRAM(s) Oral daily  HYDROmorphone   Tablet 2milliGRAM(s) Oral every 6 hours  isosorbide   mononitrate ER Tablet (IMDUR) 120milliGRAM(s) Oral daily  metoprolol 25milliGRAM(s) Oral daily  pantoprazole  Injectable 40milliGRAM(s) IV Push every 12 hours  pramipexole 0.125milliGRAM(s) Oral daily  senna 2Tablet(s) Oral at bedtime  simvastatin 10milliGRAM(s) Oral at bedtime  multivitamin 1Tablet(s) Oral daily  diltiazem   CD 120milliGRAM(s) Oral daily  heparin  Injectable 5000Unit(s) SubCutaneous every 8 hours  piperacillin/tazobactam IVPB. 3.375Gram(s) IV Intermittent every 8 hours  vancomycin  IVPB 500milliGRAM(s) IV Intermittent every 12 hours  ammonium lactate 12% Lotion 1Application(s) Topical daily    MEDICATIONS  (PRN):  acetaminophen   Tablet. 500milliGRAM(s) Oral every 6 hours PRN Mild Pain (1 - 3)  ALBUTerol    90 MICROgram(s) HFA Inhaler 2Puff(s) Inhalation every 6 hours PRN Shortness of Breath  nitroglycerin     SubLingual 0.4milliGRAM(s) SubLingual every 5 minutes PRN Chest Pain  HYDROmorphone  Injectable 0.5milliGRAM(s) IV Push every 6 hours PRN Severe Pain (7 - 10)    ICU Vital Signs Last 24 Hrs  T(C): 36.8, Max: 36.9 (06-22 @ 22:43)  T(F): 98.3, Max: 98.5 (06-22 @ 22:43)  HR: 83 (74 - 88)  BP: 166/46 (143/47 - 169/57)  BP(mean): --  ABP: --  ABP(mean): --  RR: 19 (18 - 19)  SpO2: 100% (97% - 100%)    GEN: NAD, comfortable, resting in bed  CV: S1S2, RRR, no mumur  RESP: good air movement, CTABL, no rales, rhonchi or wheezing  ABD: +BS, soft, ND, NT, no guarding, no rigidity  Skin: R groin wound vac in place and functional; distal leg wounds granulating with scattered areas of eschar, tendon on R lateral leg exposed and necrotic; foot intact; L leg with stasis changes but no cellulitis or ulcers  EXT: +2 radial and pedial pulses, no edema, no calve tenderness                                      9.0    6.1   )-----------( 334      ( 23 Jun 2017 07:18 )             28.9     23 Jun 2017 07:18    146    |  112    |  27     ----------------------------<  92     3.3     |  26     |  1.11     Ca    7.5        23 Jun 2017 07:18          CAPILLARY BLOOD GLUCOSE

## 2017-06-23 NOTE — PHYSICAL THERAPY INITIAL EVALUATION ADULT - ADDITIONAL COMMENTS
required home O2 2L/min continuously,was household ambulator with RW or cane prior to March 2017;pt owns a transport w/c,a scooter as well

## 2017-06-24 LAB
ANION GAP SERPL CALC-SCNC: 9 MMOL/L — SIGNIFICANT CHANGE UP (ref 5–17)
BLD GP AB SCN SERPL QL: SIGNIFICANT CHANGE UP
BUN SERPL-MCNC: 26 MG/DL — HIGH (ref 7–23)
CALCIUM SERPL-MCNC: 7.7 MG/DL — LOW (ref 8.5–10.1)
CHLORIDE SERPL-SCNC: 112 MMOL/L — HIGH (ref 96–108)
CO2 SERPL-SCNC: 25 MMOL/L — SIGNIFICANT CHANGE UP (ref 22–31)
CREAT SERPL-MCNC: 1.14 MG/DL — SIGNIFICANT CHANGE UP (ref 0.5–1.3)
GLUCOSE SERPL-MCNC: 104 MG/DL — HIGH (ref 70–99)
HCT VFR BLD CALC: 28.5 % — LOW (ref 39–50)
HGB BLD-MCNC: 8.9 G/DL — LOW (ref 13–17)
MCHC RBC-ENTMCNC: 28.1 PG — SIGNIFICANT CHANGE UP (ref 27–34)
MCHC RBC-ENTMCNC: 31.1 GM/DL — LOW (ref 32–36)
MCV RBC AUTO: 90.2 FL — SIGNIFICANT CHANGE UP (ref 80–100)
PLATELET # BLD AUTO: 305 K/UL — SIGNIFICANT CHANGE UP (ref 150–400)
POTASSIUM SERPL-MCNC: 3.8 MMOL/L — SIGNIFICANT CHANGE UP (ref 3.5–5.3)
POTASSIUM SERPL-SCNC: 3.8 MMOL/L — SIGNIFICANT CHANGE UP (ref 3.5–5.3)
RBC # BLD: 3.16 M/UL — LOW (ref 4.2–5.8)
RBC # FLD: 16.3 % — HIGH (ref 10.3–14.5)
SODIUM SERPL-SCNC: 146 MMOL/L — HIGH (ref 135–145)
TYPE + AB SCN PNL BLD: SIGNIFICANT CHANGE UP
WBC # BLD: 6.7 K/UL — SIGNIFICANT CHANGE UP (ref 3.8–10.5)
WBC # FLD AUTO: 6.7 K/UL — SIGNIFICANT CHANGE UP (ref 3.8–10.5)

## 2017-06-24 RX ORDER — BUDESONIDE, MICRONIZED 100 %
0.5 POWDER (GRAM) MISCELLANEOUS
Qty: 0 | Refills: 0 | Status: DISCONTINUED | OUTPATIENT
Start: 2017-06-24 | End: 2017-06-27

## 2017-06-24 RX ORDER — ASPIRIN/CALCIUM CARB/MAGNESIUM 324 MG
81 TABLET ORAL DAILY
Qty: 0 | Refills: 0 | Status: DISCONTINUED | OUTPATIENT
Start: 2017-06-24 | End: 2017-06-27

## 2017-06-24 RX ORDER — METOPROLOL TARTRATE 50 MG
25 TABLET ORAL
Qty: 0 | Refills: 0 | Status: DISCONTINUED | OUTPATIENT
Start: 2017-06-24 | End: 2017-06-27

## 2017-06-24 RX ADMIN — Medication 1 APPLICATION(S): at 11:19

## 2017-06-24 RX ADMIN — HYDROMORPHONE HYDROCHLORIDE 2 MILLIGRAM(S): 2 INJECTION INTRAMUSCULAR; INTRAVENOUS; SUBCUTANEOUS at 17:46

## 2017-06-24 RX ADMIN — Medication 50 MILLIGRAM(S): at 06:21

## 2017-06-24 RX ADMIN — Medication 325 MILLIGRAM(S): at 09:56

## 2017-06-24 RX ADMIN — HEPARIN SODIUM 5000 UNIT(S): 5000 INJECTION INTRAVENOUS; SUBCUTANEOUS at 14:19

## 2017-06-24 RX ADMIN — Medication 81 MILLIGRAM(S): at 17:14

## 2017-06-24 RX ADMIN — Medication 25 MILLIGRAM(S): at 17:48

## 2017-06-24 RX ADMIN — SIMVASTATIN 10 MILLIGRAM(S): 20 TABLET, FILM COATED ORAL at 22:37

## 2017-06-24 RX ADMIN — PANTOPRAZOLE SODIUM 40 MILLIGRAM(S): 20 TABLET, DELAYED RELEASE ORAL at 17:15

## 2017-06-24 RX ADMIN — ISOSORBIDE MONONITRATE 120 MILLIGRAM(S): 60 TABLET, EXTENDED RELEASE ORAL at 11:22

## 2017-06-24 RX ADMIN — PIPERACILLIN AND TAZOBACTAM 25 GRAM(S): 4; .5 INJECTION, POWDER, LYOPHILIZED, FOR SOLUTION INTRAVENOUS at 08:08

## 2017-06-24 RX ADMIN — PANTOPRAZOLE SODIUM 40 MILLIGRAM(S): 20 TABLET, DELAYED RELEASE ORAL at 06:20

## 2017-06-24 RX ADMIN — Medication 100 MILLIGRAM(S): at 06:19

## 2017-06-24 RX ADMIN — Medication 100 MILLIGRAM(S): at 11:21

## 2017-06-24 RX ADMIN — Medication 25 MILLIGRAM(S): at 06:19

## 2017-06-24 RX ADMIN — Medication 145 MILLIGRAM(S): at 11:22

## 2017-06-24 RX ADMIN — HYDROMORPHONE HYDROCHLORIDE 2 MILLIGRAM(S): 2 INJECTION INTRAMUSCULAR; INTRAVENOUS; SUBCUTANEOUS at 17:45

## 2017-06-24 RX ADMIN — GABAPENTIN 300 MILLIGRAM(S): 400 CAPSULE ORAL at 11:21

## 2017-06-24 RX ADMIN — Medication 0.5 MILLIGRAM(S): at 08:10

## 2017-06-24 RX ADMIN — Medication 0.5 MILLIGRAM(S): at 20:56

## 2017-06-24 RX ADMIN — HYDROMORPHONE HYDROCHLORIDE 2 MILLIGRAM(S): 2 INJECTION INTRAMUSCULAR; INTRAVENOUS; SUBCUTANEOUS at 06:19

## 2017-06-24 RX ADMIN — Medication 325 MILLIGRAM(S): at 17:15

## 2017-06-24 RX ADMIN — HYDROMORPHONE HYDROCHLORIDE 2 MILLIGRAM(S): 2 INJECTION INTRAMUSCULAR; INTRAVENOUS; SUBCUTANEOUS at 23:54

## 2017-06-24 RX ADMIN — PRAMIPEXOLE DIHYDROCHLORIDE 0.12 MILLIGRAM(S): 0.12 TABLET ORAL at 11:22

## 2017-06-24 RX ADMIN — HYDROMORPHONE HYDROCHLORIDE 2 MILLIGRAM(S): 2 INJECTION INTRAMUSCULAR; INTRAVENOUS; SUBCUTANEOUS at 17:15

## 2017-06-24 RX ADMIN — HEPARIN SODIUM 5000 UNIT(S): 5000 INJECTION INTRAVENOUS; SUBCUTANEOUS at 22:37

## 2017-06-24 RX ADMIN — PIPERACILLIN AND TAZOBACTAM 25 GRAM(S): 4; .5 INJECTION, POWDER, LYOPHILIZED, FOR SOLUTION INTRAVENOUS at 14:19

## 2017-06-24 RX ADMIN — HYDROMORPHONE HYDROCHLORIDE 2 MILLIGRAM(S): 2 INJECTION INTRAMUSCULAR; INTRAVENOUS; SUBCUTANEOUS at 11:24

## 2017-06-24 RX ADMIN — Medication 8 MILLIGRAM(S): at 22:37

## 2017-06-24 RX ADMIN — Medication 100 MILLIGRAM(S): at 17:15

## 2017-06-24 RX ADMIN — HYDROMORPHONE HYDROCHLORIDE 2 MILLIGRAM(S): 2 INJECTION INTRAMUSCULAR; INTRAVENOUS; SUBCUTANEOUS at 00:15

## 2017-06-24 RX ADMIN — Medication 1 TABLET(S): at 11:24

## 2017-06-24 RX ADMIN — Medication 120 MILLIGRAM(S): at 06:21

## 2017-06-24 RX ADMIN — HEPARIN SODIUM 5000 UNIT(S): 5000 INJECTION INTRAVENOUS; SUBCUTANEOUS at 06:19

## 2017-06-24 RX ADMIN — PIPERACILLIN AND TAZOBACTAM 25 GRAM(S): 4; .5 INJECTION, POWDER, LYOPHILIZED, FOR SOLUTION INTRAVENOUS at 22:35

## 2017-06-24 NOTE — PROGRESS NOTE ADULT - ASSESSMENT
82 year old male with a PMH of CAD s/p stents (LAD, RCA bare metal around 9/16),PVD (RLE stent/ angioplasty and surgical debridment by Dr Siu 5/20 ) A.Fib not on anticoagulation due to GI bleeding, Hypertension, COPD on home O2 2L, SHAYY on nocturnal BIPAP, ex-smoker (smoked 1ppd X 50 years, quit 22 years ago),  Chronic diastolic CHF, Hyperlipidemia, PVD, Iron deficiency anemia, Chronic back pain, Gout, BPH, CKD III with baseline Cr 2.  is seen for    1. Partial tear of Achilles tendon, right; mixed necrotic granular ulcerations of the right lower leg and foot  -Pt was seen and examined. Plan was discussed with attending Dr. Fermin.  -MRI of RLE reviewed; positive for OM of the posterior calcaneus and partial tear of the Achilles tendon.  -Dressing on the RLE kept intact since dressing was changed earlier this morning. Betadine and allevyn pad was applied to the left heel wound. Any surgical plan for RLE per Dr. Clement.  -No acute intervention for Achilles partial tear at this time due to the extensive RLE ulcers.  -Continue use of B/L zflex boots to offload B/L heels. Z-flex boots reapplied.  -Recommend IVabx per ID, appreciated.  -Podiatry to follow.    2. Other issues  -Care per Medicine, appreciated.

## 2017-06-24 NOTE — PROGRESS NOTE ADULT - SUBJECTIVE AND OBJECTIVE BOX
Chief Complaint/Reason for Admission: Sent from [facility] rehab because of low hemoglobin 7.7	  History of Present Illness: 	  82 year old male with history of CAD s/p stents (LAD, RCA bare metal around 9/16),PVD (RLE stent/ angioplasty and surgical debridment by Dr Siu 5/20 ) Brianna not on anticoagulation due to GI bleeding, Hypertension, COPD on home O2 2L, SHAYY on nocturnal BIPAP, ex-smoker (smoked 1ppd X 50 years, quit 22 years ago),  Chronic diastolic CHF, Hyperlipidemia, PVD, Iron deficiency anemia, Chronic back pain, Gout, BPH, CKD III with baseline Cr 2. Was ecently admitted to  on  4/17 with anemia of GI bleeding, RLE and RUE DVTs,( received pRBCs and IVC filter discharged to rehab with aspirin) was readmitted to   ER on 5/4/17 for worsening anemia with Hb 6, worsening leg pain from ulcers and worsening renal function Cr 3.99 now presents from [facility] with anemia (7.7 on labs today decreasing from 9.5 over past few weeks). Pt c/o gradual onset  progressive fatigue over the last couple of weeks. Patient is unable to ambulate because of his leg cellulitis and ulcers. He denies any shortness of breath, palpitations / chest pain / light headedness. According to the daughter the attendants at [facility] did notice dark stools for the last couple of days. Patient denies any abdominal pain or change in bowel or urinary habits.  In ED, + guaiac.     6/19 - Patient seen and examined. Reports 6 loose BM since this morning.  Patient has been placed on isolation until Cdiff has been rulled out.  Afebrile. hemodynamically stable.     6/20 - Patient going for muscle flap of groin area tonight after 1u PRBCs for anem.  Hemodynamically stable. Patient had low grade temp overnight.  On Vanco/Zosyn #3.  Tolerating abx well.  No Diarrhea, no rash.     6/21 - Patient seen and examined with daughter at bedside eating breakfast.  S/P right femoral wound debridement Day #1.  Wound vac in place and functional.  patient is feeling well with no complaints at this time. Has elevated BP, Will increased dose of Metoprolol to BID.  Podiatry consult placed to evaluate right achilles tendon rupture repair. Hemodynamically stable, afebrile. On Vanco/Zosyn day #4.    6/22 - Patient seen and examined.  Hemodynamically stable. NAD, Afebrile. On Vanco.Zosyn Day #5.  patient is feeling well.  Has been seen and Dr. Cardenas and may require further debridement and or AKA.  Patient is in pain despite being on Dilaudid standing q6hr. Will add Dilaudid for breakthrough pain.     6/23 - Patient seen and examined with daughter at bedside.  Had a long conversation with patient and daughter about long term goals of care.  Wound debridement vs AKA, Prosthetics, Physical therapy.  Patient is very deconditioned from chronic right LE wounds.  Patient would most likely benifit from AKA in the long term.  Hemodynamically stable.  afebrile. No overnight events.  Tolerating abx well.     6/24 - Patient seen and examined with daughter at bedside.  Discussed AKA with patient and daughter.  Patient is leaning towards having the amputation done but will decide in the next few days.  Hemodynamically stable, pain controlled, afebrile.  No overnight events.     MEDICATIONS  (STANDING):  allopurinol 100milliGRAM(s) Oral daily  doxazosin 8milliGRAM(s) Oral at bedtime  fenofibrate Tablet 145milliGRAM(s) Oral daily  ferrous    sulfate 325milliGRAM(s) Oral two times a day with meals  gabapentin 300milliGRAM(s) Oral daily  hydrALAZINE 50milliGRAM(s) Oral daily  HYDROmorphone   Tablet 2milliGRAM(s) Oral every 6 hours  isosorbide   mononitrate ER Tablet (IMDUR) 120milliGRAM(s) Oral daily  pantoprazole  Injectable 40milliGRAM(s) IV Push every 12 hours  pramipexole 0.125milliGRAM(s) Oral daily  senna 2Tablet(s) Oral at bedtime  simvastatin 10milliGRAM(s) Oral at bedtime  multivitamin 1Tablet(s) Oral daily  diltiazem   CD 120milliGRAM(s) Oral daily  heparin  Injectable 5000Unit(s) SubCutaneous every 8 hours  piperacillin/tazobactam IVPB. 3.375Gram(s) IV Intermittent every 8 hours  vancomycin  IVPB 500milliGRAM(s) IV Intermittent every 12 hours  ammonium lactate 12% Lotion 1Application(s) Topical daily  buDESOnide   0.5 milliGRAM(s) Respule 0.5milliGRAM(s) Inhalation two times a day  aspirin  chewable 81milliGRAM(s) Oral daily  metoprolol 25milliGRAM(s) Oral two times a day    MEDICATIONS  (PRN):  acetaminophen   Tablet. 500milliGRAM(s) Oral every 6 hours PRN Mild Pain (1 - 3)  ALBUTerol    90 MICROgram(s) HFA Inhaler 2Puff(s) Inhalation every 6 hours PRN Shortness of Breath  nitroglycerin     SubLingual 0.4milliGRAM(s) SubLingual every 5 minutes PRN Chest Pain  HYDROmorphone  Injectable 0.5milliGRAM(s) IV Push every 6 hours PRN Severe Pain (7 - 10)    ICU Vital Signs Last 24 Hrs  T(C): 37, Max: 37.4 (06-24 @ 11:35)  T(F): 98.6, Max: 99.4 (06-24 @ 11:35)  HR: 77 (68 - 87)  BP: 165/38 (153/41 - 166/46)  BP(mean): --  ABP: --  ABP(mean): --  RR: 18 (18 - 19)  SpO2: 99% (98% - 100%)    GEN: NAD, comfortable, resting in bed  CV: S1S2, RRR, no mumur  RESP: good air movement, CTABL, no rales, rhonchi or wheezing  ABD: +BS, soft, ND, NT, no guarding, no rigidity  Skin: R groin wound vac in place and functional; distal leg wounds granulating with scattered areas of eschar, tendon on R lateral leg exposed and necrotic; foot intact; L leg with stasis changes but no cellulitis or ulcers  EXT: +2 radial and pedial pulses, no edema, no calve tenderness                                                 8.9    6.7   )-----------( 305      ( 24 Jun 2017 07:01 )             28.5     24 Jun 2017 07:01    146    |  112    |  26     ----------------------------<  104    3.8     |  25     |  1.14     Ca    7.7        24 Jun 2017 07:01          CAPILLARY BLOOD GLUCOSE

## 2017-06-24 NOTE — PROGRESS NOTE ADULT - SUBJECTIVE AND OBJECTIVE BOX
clinically unchanged: granulating wounds R lower extremity, gangrenous heel; VAC in place    MEDICATIONS  (STANDING):  allopurinol 100milliGRAM(s) Oral daily  buDESOnide   0.5 milliGRAM(s) Respule 0.5milliGRAM(s) Inhalation daily  doxazosin 8milliGRAM(s) Oral at bedtime  fenofibrate Tablet 145milliGRAM(s) Oral daily  ferrous    sulfate 325milliGRAM(s) Oral two times a day with meals  gabapentin 300milliGRAM(s) Oral daily  hydrALAZINE 50milliGRAM(s) Oral daily  HYDROmorphone   Tablet 2milliGRAM(s) Oral every 6 hours  isosorbide   mononitrate ER Tablet (IMDUR) 120milliGRAM(s) Oral daily  metoprolol 25milliGRAM(s) Oral daily  pantoprazole  Injectable 40milliGRAM(s) IV Push every 12 hours  pramipexole 0.125milliGRAM(s) Oral daily  senna 2Tablet(s) Oral at bedtime  simvastatin 10milliGRAM(s) Oral at bedtime  multivitamin 1Tablet(s) Oral daily  diltiazem   CD 120milliGRAM(s) Oral daily  heparin  Injectable 5000Unit(s) SubCutaneous every 8 hours  piperacillin/tazobactam IVPB. 3.375Gram(s) IV Intermittent every 8 hours  vancomycin  IVPB 500milliGRAM(s) IV Intermittent every 12 hours  ammonium lactate 12% Lotion 1Application(s) Topical daily    MEDICATIONS  (PRN):  acetaminophen   Tablet. 500milliGRAM(s) Oral every 6 hours PRN Mild Pain (1 - 3)  ALBUTerol    90 MICROgram(s) HFA Inhaler 2Puff(s) Inhalation every 6 hours PRN Shortness of Breath  nitroglycerin     SubLingual 0.4milliGRAM(s) SubLingual every 5 minutes PRN Chest Pain  HYDROmorphone  Injectable 0.5milliGRAM(s) IV Push every 6 hours PRN Severe Pain (7 - 10)      Allergies    No Known Allergies    Intolerances        Flatus: [ ] YES [ ] NO             Bowel Movement: [ ] YES [ ] NO  Pain (0-10):            Pain Control Adequate: [ ] YES [ ] NO  Nausea: [ ] YES [ ] NO            Vomiting: [ ] YES [ ] NO  Diarrhea: [ ] YES [ ] NO         Constipation: [ ] YES [ ] NO     Chest Pain: [ ] YES [ ] NO    SOB:  [ ] YES [ ] NO    Vital Signs Last 24 Hrs  T(C): 36.6, Max: 36.8 (06-23 @ 10:50)  T(F): 97.9, Max: 98.3 (06-23 @ 10:50)  HR: 87 (76 - 87)  BP: 162/49 (143/47 - 166/46)  BP(mean): --  RR: 18 (18 - 19)  SpO2: 98% (97% - 100%)    I&O's Summary    I & Os for current day (as of 24 Jun 2017 08:44)  =============================================  IN: 0 ml / OUT: 50 ml / NET: -50 ml      Physical Exam:  General: NAD, resting comfortably  Pulmonary: normal resp effort, CTA-B  Cardiovascular: NSR  Abdominal: soft, NT/ND  Extremities: WWP, normal strength  Neuro: A/O x 3, CNs II-XII grossly intact, normal motor/sensation, no focal deficits  Pulses:     LABS:                        8.9    6.7   )-----------( 305      ( 24 Jun 2017 07:01 )             28.5     06-24    146<H>  |  112<H>  |  26<H>  ----------------------------<  104<H>  3.8   |  25  |  1.14    Ca    7.7<L>      24 Jun 2017 07:01            CAPILLARY BLOOD GLUCOSE      RADIOLOGY & ADDITIONAL TESTS:

## 2017-06-24 NOTE — PROGRESS NOTE ADULT - SUBJECTIVE AND OBJECTIVE BOX
82 year old male is seen bedside for followup of RLE necrotic wounds and partial tear of right Achilles tendon. Pt was admitted on 6/17/17 for GI bleed. Pt states he has had chronic ulcers on his right lower leg and foot for the past year and has been treated by Dr. Clement. States he had a right groin wound debrided in the OR with placement of wound VAC on 6/20. States the pain in his right lower leg and foot has decreased. States he has minimal pain in his left heel. States he is unable to walk due to the extensive wounds on his right lower leg and foot. Denies any f/c/n/v/sob. Nurse states she changed dressing this morning on the RLE when Dr. Clement saw the pt. Pt states he is discussing RLE AKA with family currently and will let Dr. Clement know of decision.    PMHx: CAD s/p stents (LAD, RCA bare metal around 9/16),PVD (RLE stent/ angioplasty and surgical debridment by Dr Siu 5/20 ) A.Fib not on anticoagulation due to GI bleeding, Hypertension, COPD on home O2 2L, SHAYY on nocturnal BIPAP, ex-smoker (smoked 1ppd X 50 years, quit 22 years ago),  Chronic diastolic CHF, Hyperlipidemia, PVD, Iron deficiency anemia, Chronic back pain, Gout, BPH, CKD III with baseline Cr 2.    MEDICATIONS:  MEDICATIONS  (STANDING):  allopurinol 100milliGRAM(s) Oral daily  doxazosin 8milliGRAM(s) Oral at bedtime  fenofibrate Tablet 145milliGRAM(s) Oral daily  ferrous    sulfate 325milliGRAM(s) Oral two times a day with meals  gabapentin 300milliGRAM(s) Oral daily  hydrALAZINE 50milliGRAM(s) Oral daily  HYDROmorphone   Tablet 2milliGRAM(s) Oral every 6 hours  isosorbide   mononitrate ER Tablet (IMDUR) 120milliGRAM(s) Oral daily  metoprolol 25milliGRAM(s) Oral daily  pantoprazole  Injectable 40milliGRAM(s) IV Push every 12 hours  pramipexole 0.125milliGRAM(s) Oral daily  senna 2Tablet(s) Oral at bedtime  simvastatin 10milliGRAM(s) Oral at bedtime  multivitamin 1Tablet(s) Oral daily  diltiazem   CD 120milliGRAM(s) Oral daily  heparin  Injectable 5000Unit(s) SubCutaneous every 8 hours  piperacillin/tazobactam IVPB. 3.375Gram(s) IV Intermittent every 8 hours  vancomycin  IVPB 500milliGRAM(s) IV Intermittent every 12 hours  ammonium lactate 12% Lotion 1Application(s) Topical daily  buDESOnide   0.5 milliGRAM(s) Respule 0.5milliGRAM(s) Inhalation two times a day    Allergies: NKFDA    Vital Signs Last 24 Hrs  T(C): 37.4, Max: 37.4 (06-24 @ 11:35)  T(F): 99.4, Max: 99.4 (06-24 @ 11:35)  HR: 68 (68 - 87)  BP: 154/38 (153/41 - 166/46)  BP(mean): --  RR: 18 (18 - 19)  SpO2: 99% (98% - 100%)    I&O's Summary  I & Os for 24h ending 24 Jun 2017 07:00  =============================================  IN: 0 ml / OUT: 50 ml / NET: -50 ml    I & Os for current day (as of 24 Jun 2017 12:06)  =============================================  IN: 100 ml / OUT: 200 ml / NET: -100 ml      PHYSICAL EXAM:  PHYSICAL EXAM:  Constitutional: NAD, awake and alert, well-developed  Extremities:   Vascular- Non palpable DP and PT pulses of the right foot.  Neuro- Protective sensation is decreased.  Derm- Dressing noted to be clean, dry and intact on the RLE- noted removed at this time since dressing was changed earlier this morning. Eschar like ulcer noted on the postero plantar left heel. No fluctuance noted of the left foot.  Ortho- Pain upon palpation of the right lower leg and foot. Able to actively lift right lower leg with pain.      LABS: All Labs Reviewed:                        8.9    6.7   )-----------( 305      ( 24 Jun 2017 07:01 )             28.5     06-24    146<H>  |  112<H>  |  26<H>  ----------------------------<  104<H>  3.8   |  25  |  1.14    Ca    7.7<L>      24 Jun 2017 07:01    Blood Culture:   Culture - Blood (06.17.17 @ 03:54)    Specimen Source: .Blood Blood    Culture Results:   No growth at 5 days.    RADIOLOGY/EKG:    EXAM:  LWR EXT (MRI) RT W O CON                            PROCEDURE DATE:  06/18/2017        INTERPRETATION:  LWR EXT (MRI) RT W O CON dated 6/18/2017 11:50 AM     INDICATION: Pain and swelling    TECHNIQUE: Multi-sequential, multiplanar MRI imaging of the ankle and   hindfoot was performed per standard protocol.     COMPARISON: None available.    FINDINGS: Evaluation of the soft tissues demonstrates a large posterior   skin ulcer measuring approximately 4.5 x 5.2 cm. Deep to this skin ulcer,   there is nonspecific soft tissue swelling and edema. There is tracking   edema from the ulcer to the proximal calcaneus extending towards the   medial aspect of the calcaneus suspicious for small abscess that measures   1.2 x 2.0 x 3.2 cm (series 10, image 13 and series 8, image 10). There is   partial tearing of the lateral aspect of the Achilles tendon which   closely approximates the skin ulcer and may be exposed. There is partial   tearing of the central plantar fascial cord and full-thickness tear of   the lateral plantar fascial cord at its origin on the calcaneus. The   remaining tendons are preserved. There is no tenosynovitis appreciated.    Evaluation of the bone marrow signal demonstrates erosive change   involving the lateral calcaneus. This erosion measures 1.1 cm. There is   abnormal decreased T1 signal with edema in the posterior aspect of the   calcaneus adjacent to the origin compatible with osteomyelitis. There is   a curvilinear focus of decreased T1 signal that measures up to 1.8 cm   (series 7, image six) within the posterior calcaneus suspicious for an   area of osteonecrosis. Patchy appearance of the bone marrow signal   throughout the foot as can be seen in the setting of decreased bone   mineralization. The tibiotalar, posterior subtalar, middle subtalar,   calcaneocuboid, and talonavicular joints are intact. There is relative   preservation of the midfoot joint spaces. No fracture is identified.    There is no soft tissue mass. The neurovascular structures are relatively   preserved.      IMPRESSION:   1.  Large skin ulcer at the posterior aspect of the heel.  2.  Fluid tracking from the skin ulcer towards the medial calcaneus   suspicious for small abscess.  3.  Osteomyelitis of the posterior calcaneus.  4.  Osteonecrosis within the posterior calcaneus.  5.  Partial tearing of the lateral Achilles insertion.  6.  Full-thickness tearing of the lateral cord of the plantar fascia with   partial tear of the central cord.

## 2017-06-24 NOTE — PROGRESS NOTE ADULT - ASSESSMENT
Anemia–likely due to anemia of chronic disease and contributing factor possibly from GI source as well.  Would follow serial H&H's.  SP PRBC for surg and hct inc, and dec likely post op but will follow HCT  His hematocrit appears to be stable in the hospital. I had discussion with the patient and his daughter. For now, we will hold off on endoscopy as he is at high risk. However, if there is plan for vascular intervention and increasing   antiplatelet agents, we may want cross the bridge of endoscopic evaluation again.  restarted iron and vida      CAD: ASArestarted  DW pt and family risk MI vs risk bleed        Would clinically follow and ascertain for evidence of significant bleeding.     Would continue proton pump inhibitor twice a day for now.

## 2017-06-24 NOTE — PROGRESS NOTE ADULT - SUBJECTIVE AND OBJECTIVE BOX
Patient is a 82y old  Male who presents with a chief complaint of Sent from [facility] rehab because of low hemoglobin 7.7 (17 Jun 2017 00:37)      HPI:  82 year old male with history of CAD s/p stents (LAD, RCA bare metal around 9/16),PVD (RLE stent/ angioplasty and surgical debridment by Dr iSu 5/20 ) A.Fib not on anticoagulation due to GI bleeding, Hypertension, COPD on home O2 2L, SHAYY on nocturnal BIPAP, ex-smoker (smoked 1ppd X 50 years, quit 22 years ago),  Chronic diastolic CHF, Hyperlipidemia, PVD, Iron deficiency anemia, Chronic back pain, Gout, BPH, CKD III with baseline Cr 2. Was ecently admitted to  on  4/17 with anemia of GI bleeding, RLE and RUE DVTs,( received pRBCs and IVC filter discharged to rehab with aspirin) was readmitted to   ER on 5/4/17 for worsening anemia with Hb 6, worsening leg pain from ulcers and worsening renal function Cr 3.99 now presents from [facility] with anemia (7.7 on labs today decreasing from 9.5 over past few weeks). Pt c/o gradual onset  progressive fatigue over the last couple of weeks. Patient is unable to ambulate because of his leg cellulitis and ulcers. He denies any shortness of breath, palpitations / chest pain / light headedness. According to the daughter the attendants at [facility] did notice dark stools for the last couple of days. Patient denies any abdominal pain or change in bowel or urinary habits.  In ED, + guaiac. (17 Jun 2017 00:37)    pt comf   no evidence of GIB  vida po  pos BM  mild lower abd pain s radiation prior to BM      PAST MEDICAL & SURGICAL HISTORY:  Sepsis, due to unspecified organism: 2/2 poorly healing wounds b/l  BPH (benign prostatic hypertrophy)  Hyperlipemia  Coronary artery disease  Hypertension  Dyspepsia: On moderate exertion.  Sleep apnea, obstructive: Requires home 02 therapy, and treatment with BIPAP  Atelectasis  Pleural effusion, bilateral  Respiratory failure  Peripheral edema  CRI (chronic renal insufficiency)  Gout  CRF (chronic renal failure)  Benign prostatic hypertrophy  Spinal stenosis  Hypercholesterolemia  GERD (gastroesophageal reflux disease)  CAD (coronary artery disease)  Hypertension  S/P angioplasty with stent  Cataract of left eye  Prostate: Surgery green light procedure.  S/P rotator cuff surgery: Right  S/P angioplasty  Rotator cuff tear, right: repair      MEDICATIONS  (STANDING):  allopurinol 100milliGRAM(s) Oral daily  doxazosin 8milliGRAM(s) Oral at bedtime  fenofibrate Tablet 145milliGRAM(s) Oral daily  ferrous    sulfate 325milliGRAM(s) Oral two times a day with meals  gabapentin 300milliGRAM(s) Oral daily  hydrALAZINE 50milliGRAM(s) Oral daily  HYDROmorphone   Tablet 2milliGRAM(s) Oral every 6 hours  isosorbide   mononitrate ER Tablet (IMDUR) 120milliGRAM(s) Oral daily  metoprolol 25milliGRAM(s) Oral daily  pantoprazole  Injectable 40milliGRAM(s) IV Push every 12 hours  pramipexole 0.125milliGRAM(s) Oral daily  senna 2Tablet(s) Oral at bedtime  simvastatin 10milliGRAM(s) Oral at bedtime  multivitamin 1Tablet(s) Oral daily  diltiazem   CD 120milliGRAM(s) Oral daily  heparin  Injectable 5000Unit(s) SubCutaneous every 8 hours  piperacillin/tazobactam IVPB. 3.375Gram(s) IV Intermittent every 8 hours  vancomycin  IVPB 500milliGRAM(s) IV Intermittent every 12 hours  ammonium lactate 12% Lotion 1Application(s) Topical daily  buDESOnide   0.5 milliGRAM(s) Respule 0.5milliGRAM(s) Inhalation two times a day  aspirin  chewable 81milliGRAM(s) Oral daily    MEDICATIONS  (PRN):  acetaminophen   Tablet. 500milliGRAM(s) Oral every 6 hours PRN Mild Pain (1 - 3)  ALBUTerol    90 MICROgram(s) HFA Inhaler 2Puff(s) Inhalation every 6 hours PRN Shortness of Breath  nitroglycerin     SubLingual 0.4milliGRAM(s) SubLingual every 5 minutes PRN Chest Pain  HYDROmorphone  Injectable 0.5milliGRAM(s) IV Push every 6 hours PRN Severe Pain (7 - 10)      Allergies    No Known Allergies    Intolerances        SOCIAL HISTORY:unchanged    FAMILY HISTORY:  No pertinent family history in first degree relatives      REVIEW OF SYSTEMS:    CONSTITUTIONAL: No weakness, fevers or chills  EYES/ENT: No visual changes;  No vertigo or throat pain   NECK: No pain or stiffness  RESPIRATORY: No cough, wheezing, hemoptysis; No shortness of breath  CARDIOVASCULAR: No chest pain or palpitations  GENITOURINARY: No dysuria, frequency or hematuria  NEUROLOGICAL: No numbness or weakness  SKIN: No itching, burning, rashes, or lesions   All other review of systems is negative unless indicated above.    Vital Signs Last 24 Hrs  T(C): 37.4, Max: 37.4 (06-24 @ 11:35)  T(F): 99.4, Max: 99.4 (06-24 @ 11:35)  HR: 68 (68 - 87)  BP: 154/38 (153/41 - 166/46)  BP(mean): --  RR: 18 (18 - 19)  SpO2: 99% (98% - 100%)    PHYSICAL EXAM:    Constitutional: NAD, well-developed  HEENT: EOMI, throat clear  Neck: No LAD, supple  Respiratory: CTA and P  Cardiovascular: S1 and S2, RRR, no M  Gastrointestinal: BS+, soft, NT/ND, neg HSM,  Extremities: No peripheral edema, neg clubing, cyanosis  Vascular: 2+ peripheral pulses  Neurological: A/O x 3, no focal deficits  Psychiatric: Normal mood, normal affect  Skin: No rashes    LABS:  CBC Full  -  ( 24 Jun 2017 07:01 )  WBC Count : 6.7 K/uL  Hemoglobin : 8.9 g/dL  Hematocrit : 28.5 %  Platelet Count - Automated : 305 K/uL  Mean Cell Volume : 90.2 fl  Mean Cell Hemoglobin : 28.1 pg  Mean Cell Hemoglobin Concentration : 31.1 gm/dL  Auto Neutrophil # : x  Auto Lymphocyte # : x  Auto Monocyte # : x  Auto Eosinophil # : x  Auto Basophil # : x  Auto Neutrophil % : x  Auto Lymphocyte % : x  Auto Monocyte % : x  Auto Eosinophil % : x  Auto Basophil % : x    06-24    146<H>  |  112<H>  |  26<H>  ----------------------------<  104<H>  3.8   |  25  |  1.14    Ca    7.7<L>      24 Jun 2017 07:01              RADIOLOGY & ADDITIONAL STUDIES:

## 2017-06-24 NOTE — PROGRESS NOTE ADULT - ASSESSMENT
long discussion with patient and daughter Ivis re options and specifically AKA.  They will consider their options and let me know their decision over next day(s)

## 2017-06-25 LAB
ANION GAP SERPL CALC-SCNC: 5 MMOL/L — SIGNIFICANT CHANGE UP (ref 5–17)
BUN SERPL-MCNC: 27 MG/DL — HIGH (ref 7–23)
CALCIUM SERPL-MCNC: 7.7 MG/DL — LOW (ref 8.5–10.1)
CHLORIDE SERPL-SCNC: 113 MMOL/L — HIGH (ref 96–108)
CO2 SERPL-SCNC: 28 MMOL/L — SIGNIFICANT CHANGE UP (ref 22–31)
CREAT SERPL-MCNC: 1.17 MG/DL — SIGNIFICANT CHANGE UP (ref 0.5–1.3)
GLUCOSE SERPL-MCNC: 102 MG/DL — HIGH (ref 70–99)
HCT VFR BLD CALC: 27.2 % — LOW (ref 39–50)
HGB BLD-MCNC: 8.7 G/DL — LOW (ref 13–17)
MCHC RBC-ENTMCNC: 28.4 PG — SIGNIFICANT CHANGE UP (ref 27–34)
MCHC RBC-ENTMCNC: 31.8 GM/DL — LOW (ref 32–36)
MCV RBC AUTO: 89.3 FL — SIGNIFICANT CHANGE UP (ref 80–100)
PLATELET # BLD AUTO: 284 K/UL — SIGNIFICANT CHANGE UP (ref 150–400)
POTASSIUM SERPL-MCNC: 3.8 MMOL/L — SIGNIFICANT CHANGE UP (ref 3.5–5.3)
POTASSIUM SERPL-SCNC: 3.8 MMOL/L — SIGNIFICANT CHANGE UP (ref 3.5–5.3)
RBC # BLD: 3.05 M/UL — LOW (ref 4.2–5.8)
RBC # FLD: 16 % — HIGH (ref 10.3–14.5)
SODIUM SERPL-SCNC: 146 MMOL/L — HIGH (ref 135–145)
WBC # BLD: 6.8 K/UL — SIGNIFICANT CHANGE UP (ref 3.8–10.5)
WBC # FLD AUTO: 6.8 K/UL — SIGNIFICANT CHANGE UP (ref 3.8–10.5)

## 2017-06-25 RX ORDER — ASCORBIC ACID 60 MG
500 TABLET,CHEWABLE ORAL DAILY
Qty: 0 | Refills: 0 | Status: DISCONTINUED | OUTPATIENT
Start: 2017-06-25 | End: 2017-06-27

## 2017-06-25 RX ORDER — ZINC SULFATE TAB 220 MG (50 MG ZINC EQUIVALENT) 220 (50 ZN) MG
220 TAB ORAL DAILY
Qty: 0 | Refills: 0 | Status: DISCONTINUED | OUTPATIENT
Start: 2017-06-25 | End: 2017-06-27

## 2017-06-25 RX ADMIN — HEPARIN SODIUM 5000 UNIT(S): 5000 INJECTION INTRAVENOUS; SUBCUTANEOUS at 22:03

## 2017-06-25 RX ADMIN — SIMVASTATIN 10 MILLIGRAM(S): 20 TABLET, FILM COATED ORAL at 22:05

## 2017-06-25 RX ADMIN — Medication 8 MILLIGRAM(S): at 22:05

## 2017-06-25 RX ADMIN — Medication 100 MILLIGRAM(S): at 12:01

## 2017-06-25 RX ADMIN — Medication 325 MILLIGRAM(S): at 09:22

## 2017-06-25 RX ADMIN — PIPERACILLIN AND TAZOBACTAM 25 GRAM(S): 4; .5 INJECTION, POWDER, LYOPHILIZED, FOR SOLUTION INTRAVENOUS at 22:06

## 2017-06-25 RX ADMIN — Medication 500 MILLIGRAM(S): at 17:53

## 2017-06-25 RX ADMIN — GABAPENTIN 300 MILLIGRAM(S): 400 CAPSULE ORAL at 12:01

## 2017-06-25 RX ADMIN — PRAMIPEXOLE DIHYDROCHLORIDE 0.12 MILLIGRAM(S): 0.12 TABLET ORAL at 12:01

## 2017-06-25 RX ADMIN — Medication 325 MILLIGRAM(S): at 17:53

## 2017-06-25 RX ADMIN — Medication 1 APPLICATION(S): at 12:03

## 2017-06-25 RX ADMIN — PIPERACILLIN AND TAZOBACTAM 25 GRAM(S): 4; .5 INJECTION, POWDER, LYOPHILIZED, FOR SOLUTION INTRAVENOUS at 13:31

## 2017-06-25 RX ADMIN — SENNA PLUS 2 TABLET(S): 8.6 TABLET ORAL at 22:04

## 2017-06-25 RX ADMIN — ZINC SULFATE TAB 220 MG (50 MG ZINC EQUIVALENT) 220 MILLIGRAM(S): 220 (50 ZN) TAB at 22:04

## 2017-06-25 RX ADMIN — Medication 25 MILLIGRAM(S): at 05:52

## 2017-06-25 RX ADMIN — PIPERACILLIN AND TAZOBACTAM 25 GRAM(S): 4; .5 INJECTION, POWDER, LYOPHILIZED, FOR SOLUTION INTRAVENOUS at 06:56

## 2017-06-25 RX ADMIN — Medication 145 MILLIGRAM(S): at 12:01

## 2017-06-25 RX ADMIN — Medication 1 TABLET(S): at 12:01

## 2017-06-25 RX ADMIN — PANTOPRAZOLE SODIUM 40 MILLIGRAM(S): 20 TABLET, DELAYED RELEASE ORAL at 05:51

## 2017-06-25 RX ADMIN — PANTOPRAZOLE SODIUM 40 MILLIGRAM(S): 20 TABLET, DELAYED RELEASE ORAL at 17:51

## 2017-06-25 RX ADMIN — HYDROMORPHONE HYDROCHLORIDE 2 MILLIGRAM(S): 2 INJECTION INTRAMUSCULAR; INTRAVENOUS; SUBCUTANEOUS at 12:04

## 2017-06-25 RX ADMIN — HYDROMORPHONE HYDROCHLORIDE 2 MILLIGRAM(S): 2 INJECTION INTRAMUSCULAR; INTRAVENOUS; SUBCUTANEOUS at 18:34

## 2017-06-25 RX ADMIN — Medication 100 MILLIGRAM(S): at 05:51

## 2017-06-25 RX ADMIN — Medication 120 MILLIGRAM(S): at 05:52

## 2017-06-25 RX ADMIN — HEPARIN SODIUM 5000 UNIT(S): 5000 INJECTION INTRAVENOUS; SUBCUTANEOUS at 13:30

## 2017-06-25 RX ADMIN — HEPARIN SODIUM 5000 UNIT(S): 5000 INJECTION INTRAVENOUS; SUBCUTANEOUS at 05:52

## 2017-06-25 RX ADMIN — Medication 0.5 MILLIGRAM(S): at 19:36

## 2017-06-25 RX ADMIN — HYDROMORPHONE HYDROCHLORIDE 2 MILLIGRAM(S): 2 INJECTION INTRAMUSCULAR; INTRAVENOUS; SUBCUTANEOUS at 05:53

## 2017-06-25 RX ADMIN — Medication 81 MILLIGRAM(S): at 12:01

## 2017-06-25 RX ADMIN — Medication 100 MILLIGRAM(S): at 17:53

## 2017-06-25 RX ADMIN — HYDROMORPHONE HYDROCHLORIDE 2 MILLIGRAM(S): 2 INJECTION INTRAMUSCULAR; INTRAVENOUS; SUBCUTANEOUS at 17:53

## 2017-06-25 RX ADMIN — Medication 0.5 MILLIGRAM(S): at 08:02

## 2017-06-25 RX ADMIN — HYDROMORPHONE HYDROCHLORIDE 2 MILLIGRAM(S): 2 INJECTION INTRAMUSCULAR; INTRAVENOUS; SUBCUTANEOUS at 11:59

## 2017-06-25 RX ADMIN — HYDROMORPHONE HYDROCHLORIDE 2 MILLIGRAM(S): 2 INJECTION INTRAMUSCULAR; INTRAVENOUS; SUBCUTANEOUS at 23:18

## 2017-06-25 RX ADMIN — ISOSORBIDE MONONITRATE 120 MILLIGRAM(S): 60 TABLET, EXTENDED RELEASE ORAL at 12:02

## 2017-06-25 RX ADMIN — Medication 25 MILLIGRAM(S): at 17:51

## 2017-06-25 RX ADMIN — HYDROMORPHONE HYDROCHLORIDE 2 MILLIGRAM(S): 2 INJECTION INTRAMUSCULAR; INTRAVENOUS; SUBCUTANEOUS at 15:02

## 2017-06-25 RX ADMIN — Medication 50 MILLIGRAM(S): at 05:52

## 2017-06-25 NOTE — PROGRESS NOTE ADULT - PROBLEM SELECTOR PLAN 2
- inguinal wound culture shows mixed GNR, EN spp and corynebacterium spp  - on vancomycin 500 mg IV q12h and zosyn 3.375 gm IV q8h # 8  - S/P right groin wound debridement with wound vac in place by Dr. Clement (Vascular)  - Patient will require extensive debridement or AKA  - Discussed both options and long term goals with patient and daughter.  They are leaning towards AKA at this time, Awaiting go ahead by Vascular - Dr. Clement

## 2017-06-25 NOTE — PROGRESS NOTE ADULT - SUBJECTIVE AND OBJECTIVE BOX
no new events; pt sleeping; will discuss decision tomorrow    MEDICATIONS  (STANDING):  allopurinol 100milliGRAM(s) Oral daily  doxazosin 8milliGRAM(s) Oral at bedtime  fenofibrate Tablet 145milliGRAM(s) Oral daily  ferrous    sulfate 325milliGRAM(s) Oral two times a day with meals  gabapentin 300milliGRAM(s) Oral daily  hydrALAZINE 50milliGRAM(s) Oral daily  HYDROmorphone   Tablet 2milliGRAM(s) Oral every 6 hours  isosorbide   mononitrate ER Tablet (IMDUR) 120milliGRAM(s) Oral daily  pantoprazole  Injectable 40milliGRAM(s) IV Push every 12 hours  pramipexole 0.125milliGRAM(s) Oral daily  senna 2Tablet(s) Oral at bedtime  simvastatin 10milliGRAM(s) Oral at bedtime  multivitamin 1Tablet(s) Oral daily  diltiazem   CD 120milliGRAM(s) Oral daily  heparin  Injectable 5000Unit(s) SubCutaneous every 8 hours  piperacillin/tazobactam IVPB. 3.375Gram(s) IV Intermittent every 8 hours  vancomycin  IVPB 500milliGRAM(s) IV Intermittent every 12 hours  ammonium lactate 12% Lotion 1Application(s) Topical daily  buDESOnide   0.5 milliGRAM(s) Respule 0.5milliGRAM(s) Inhalation two times a day  aspirin  chewable 81milliGRAM(s) Oral daily  metoprolol 25milliGRAM(s) Oral two times a day  zinc sulfate 220milliGRAM(s) Oral daily  ascorbic acid 500milliGRAM(s) Oral daily    MEDICATIONS  (PRN):  acetaminophen   Tablet. 500milliGRAM(s) Oral every 6 hours PRN Mild Pain (1 - 3)  ALBUTerol    90 MICROgram(s) HFA Inhaler 2Puff(s) Inhalation every 6 hours PRN Shortness of Breath  nitroglycerin     SubLingual 0.4milliGRAM(s) SubLingual every 5 minutes PRN Chest Pain  HYDROmorphone  Injectable 0.5milliGRAM(s) IV Push every 6 hours PRN Severe Pain (7 - 10)      Allergies    No Known Allergies    Intolerances        Flatus: [ ] YES [ ] NO             Bowel Movement: [ ] YES [ ] NO  Pain (0-10):            Pain Control Adequate: [ ] YES [ ] NO  Nausea: [ ] YES [ ] NO            Vomiting: [ ] YES [ ] NO  Diarrhea: [ ] YES [ ] NO         Constipation: [ ] YES [ ] NO     Chest Pain: [ ] YES [ ] NO    SOB:  [ ] YES [ ] NO    Vital Signs Last 24 Hrs  T(C): 37, Max: 37.2 (06-25 @ 11:53)  T(F): 98.6, Max: 99 (06-25 @ 11:53)  HR: 70 (68 - 85)  BP: 150/43 (150/43 - 161/50)  BP(mean): --  RR: 18 (18 - 18)  SpO2: 98% (96% - 100%)    I&O's Summary  I & Os for 24h ending 25 Jun 2017 07:00  =============================================  IN: 200 ml / OUT: 525 ml / NET: -325 ml    I & Os for current day (as of 25 Jun 2017 18:50)  =============================================  IN: 1040 ml / OUT: 300 ml / NET: 740 ml      Physical Exam:  General: NAD, resting comfortably  Pulmonary: normal resp effort, CTA-B  Cardiovascular: NSR  Abdominal: soft, NT/ND  Extremities: WWP, normal strength  Neuro: A/O x 3, CNs II-XII grossly intact, normal motor/sensation, no focal deficits  Pulses:   Right:                                                                          Left:  FEM [ ]2+ [ ]1+ [ ]doppler                                             FEM [ ]2+ [ ]1+ [ ]doppler    POP [ ]2+ [ ]1+ [ ]doppler                                             POP [ ]2+ [ ]1+ [ ]doppler    DP [ ]2+ [ ]1+ [ ]doppler                                                DP [ ]2+ [ ]1+ [ ]doppler  PT[ ]2+ [ ]1+ [ ]doppler                                                  PT [ ]2+ [ ]1+ [ ]doppler    LABS:                        8.7    6.8   )-----------( 284      ( 25 Jun 2017 07:36 )             27.2     06-25    146<H>  |  113<H>  |  27<H>  ----------------------------<  102<H>  3.8   |  28  |  1.17    Ca    7.7<L>      25 Jun 2017 07:36            CAPILLARY BLOOD GLUCOSE      RADIOLOGY & ADDITIONAL TESTS:

## 2017-06-25 NOTE — PROGRESS NOTE ADULT - SUBJECTIVE AND OBJECTIVE BOX
Chief Complaint/Reason for Admission: Sent from [facility] rehab because of low hemoglobin 7.7 	  History of Present Illness: 	  82 year old male with history of CAD s/p stents (LAD, RCA bare metal around 9/16),PVD (RLE stent/ angioplasty and surgical debridment by Dr Siu 5/20 ) Brianna not on anticoagulation due to GI bleeding, Hypertension, COPD on home O2 2L, SHAYY on nocturnal BIPAP, ex-smoker (smoked 1ppd X 50 years, quit 22 years ago),  Chronic diastolic CHF, Hyperlipidemia, PVD, Iron deficiency anemia, Chronic back pain, Gout, BPH, CKD III with baseline Cr 2. Was ecently admitted to  on  4/17 with anemia of GI bleeding, RLE and RUE DVTs,( received pRBCs and IVC filter discharged to rehab with aspirin) was readmitted to   ER on 5/4/17 for worsening anemia with Hb 6, worsening leg pain from ulcers and worsening renal function Cr 3.99 now presents from [facility] with anemia (7.7 on labs today decreasing from 9.5 over past few weeks). Pt c/o gradual onset  progressive fatigue over the last couple of weeks. Patient is unable to ambulate because of his leg cellulitis and ulcers. He denies any shortness of breath, palpitations / chest pain / light headedness. According to the daughter the attendants at [facility] did notice dark stools for the last couple of days. Patient denies any abdominal pain or change in bowel or urinary habits.  In ED, + guaiac.     6/19 - Patient seen and examined. Reports 6 loose BM since this morning.  Patient has been placed on isolation until Cdiff has been rulled out.  Afebrile. hemodynamically stable.     6/20 - Patient going for muscle flap of groin area tonight after 1u PRBCs for anem.  Hemodynamically stable. Patient had low grade temp overnight.  On Vanco/Zosyn #3.  Tolerating abx well.  No Diarrhea, no rash.     6/21 - Patient seen and examined with daughter at bedside eating breakfast.  S/P right femoral wound debridement Day #1.  Wound vac in place and functional.  patient is feeling well with no complaints at this time. Has elevated BP, Will increased dose of Metoprolol to BID.  Podiatry consult placed to evaluate right achilles tendon rupture repair. Hemodynamically stable, afebrile. On Vanco/Zosyn day #4.    6/22 - Patient seen and examined.  Hemodynamically stable. NAD, Afebrile. On Vanco.Zosyn Day #5.  patient is feeling well.  Has been seen and Dr. Cardenas and may require further debridement and or AKA.  Patient is in pain despite being on Dilaudid standing q6hr. Will add Dilaudid for breakthrough pain.     6/23 - Patient seen and examined with daughter at bedside.  Had a long conversation with patient and daughter about long term goals of care.  Wound debridement vs AKA, Prosthetics, Physical therapy.  Patient is very deconditioned from chronic right LE wounds.  Patient would most likely benifit from AKA in the long term.  Hemodynamically stable.  afebrile. No overnight events.  Tolerating abx well.     6/24 - Patient seen and examined with daughter at bedside.  Discussed AKA with patient and daughter.  Patient is leaning towards having the amputation done but will decide in the next few days.  Hemodynamically stable, pain controlled, afebrile.  No overnight events.     6/25 - Patient seen and examined at bedside; no overnight events and no new complaints at this time; VSS and pain controlled; Awaiting reevaluation by Vascular Dr. Clement for RLE AKA amputation; Patient leaning toward this decision and awaiting to hear final decision by [place]    MEDICATIONS  (STANDING):  allopurinol 100milliGRAM(s) Oral daily  doxazosin 8milliGRAM(s) Oral at bedtime  fenofibrate Tablet 145milliGRAM(s) Oral daily  ferrous    sulfate 325milliGRAM(s) Oral two times a day with meals  gabapentin 300milliGRAM(s) Oral daily  hydrALAZINE 50milliGRAM(s) Oral daily  HYDROmorphone   Tablet 2milliGRAM(s) Oral every 6 hours  isosorbide   mononitrate ER Tablet (IMDUR) 120milliGRAM(s) Oral daily  pantoprazole  Injectable 40milliGRAM(s) IV Push every 12 hours  pramipexole 0.125milliGRAM(s) Oral daily  senna 2Tablet(s) Oral at bedtime  simvastatin 10milliGRAM(s) Oral at bedtime  multivitamin 1Tablet(s) Oral daily  diltiazem   CD 120milliGRAM(s) Oral daily  heparin  Injectable 5000Unit(s) SubCutaneous every 8 hours  piperacillin/tazobactam IVPB. 3.375Gram(s) IV Intermittent every 8 hours  vancomycin  IVPB 500milliGRAM(s) IV Intermittent every 12 hours  ammonium lactate 12% Lotion 1Application(s) Topical daily  buDESOnide   0.5 milliGRAM(s) Respule 0.5milliGRAM(s) Inhalation two times a day  aspirin  chewable 81milliGRAM(s) Oral daily  metoprolol 25milliGRAM(s) Oral two times a day  zinc sulfate 220milliGRAM(s) Oral daily  ascorbic acid 500milliGRAM(s) Oral daily    MEDICATIONS  (PRN):  acetaminophen   Tablet. 500milliGRAM(s) Oral every 6 hours PRN Mild Pain (1 - 3)  ALBUTerol    90 MICROgram(s) HFA Inhaler 2Puff(s) Inhalation every 6 hours PRN Shortness of Breath  nitroglycerin     SubLingual 0.4milliGRAM(s) SubLingual every 5 minutes PRN Chest Pain  HYDROmorphone  Injectable 0.5milliGRAM(s) IV Push every 6 hours PRN Severe Pain (7 - 10)    Vital Signs Last 24 Hrs  T(C): 37, Max: 37.2 (06-25 @ 11:53)  T(F): 98.6, Max: 99 (06-25 @ 11:53)  HR: 70 (68 - 85)  BP: 150/43 (150/43 - 161/50)  BP(mean): --  RR: 18 (18 - 18)  SpO2: 98% (96% - 100%)    GEN: NAD, comfortable, resting in bed  CV: S1S2, RRR, no mumur  RESP: good air movement, CTABL, no rales, rhonchi or wheezing  ABD: +BS, soft, ND, NT, no guarding, no rigidity  Skin: R groin wound vac in place and functional; distal leg wounds granulating with scattered areas of eschar, tendon on R lateral leg exposed and necrotic; foot intact; L leg with stasis changes but no cellulitis or ulcers  EXT: +2 radial and pedal pulses, no edema, no calve tenderness                                               8.7    6.8   )-----------( 284      ( 25 Jun 2017 07:36 )             27.2   06-25    146<H>  |  113<H>  |  27<H>  ----------------------------<  102<H>  3.8   |  28  |  1.17    Ca    7.7<L>      25 Jun 2017 07:36

## 2017-06-25 NOTE — PROGRESS NOTE ADULT - SUBJECTIVE AND OBJECTIVE BOX
Patient is a 82y old  Male who presents with a chief complaint of Right Leg Venous Ulcers (17 Jun 2017 07:52)      HPI:  82 year old male with history of CAD s/p stents (LAD, RCA bare metal around 9/16),PVD (RLE stent/ angioplasty and surgical debridment by Dr Siu 5/20 ) Brianna not on anticoagulation due to GI bleeding, Hypertension, COPD on home O2 2L, SHAYY on nocturnal BIPAP, ex-smoker (smoked 1ppd X 50 years, quit 22 years ago),  Chronic diastolic CHF, Hyperlipidemia, PVD, Iron deficiency anemia, Chronic back pain, Gout, BPH, CKD III with baseline Cr 2. Was ecently admitted to  on  4/17 with anemia of GI bleeding, RLE and RUE DVTs,( received pRBCs and IVC filter discharged to rehab with aspirin) was readmitted to   ER on 5/4/17 for worsening anemia with Hb 6, worsening leg pain from ulcers and worsening renal function Cr 3.99 now presents from [facility] with anemia (7.7 on labs today decreasing from 9.5 over past few weeks). Pt c/o gradual onset  progressive fatigue over the last couple of weeks. Patient is unable to ambulate because of his leg cellulitis and ulcers. He denies any shortness of breath, palpitations / chest pain / light headedness. According to the daughter the attendants at [facility] did notice dark stools for the last couple of days. Patient denies any abdominal pain or change in bowel or urinary habits.  In ED, + guaiac. (17 Jun 2017 00:37)    pt comf  vida PO and neg NV  vida ASA  mild lower cramping prior to BM and resolved with BM      PAST MEDICAL & SURGICAL HISTORY:  Sepsis, due to unspecified organism: 2/2 poorly healing wounds b/l  BPH (benign prostatic hypertrophy)  Hyperlipemia  Coronary artery disease  Hypertension  Dyspepsia: On moderate exertion.  Sleep apnea, obstructive: Requires home 02 therapy, and treatment with BIPAP  Atelectasis  Pleural effusion, bilateral  Respiratory failure  Peripheral edema  CRI (chronic renal insufficiency)  Gout  CRF (chronic renal failure)  Benign prostatic hypertrophy  Spinal stenosis  Hypercholesterolemia  GERD (gastroesophageal reflux disease)  CAD (coronary artery disease)  Hypertension  S/P angioplasty with stent  Cataract of left eye  Prostate: Surgery green light procedure.  S/P rotator cuff surgery: Right  S/P angioplasty  Rotator cuff tear, right: repair      MEDICATIONS  (STANDING):  allopurinol 100milliGRAM(s) Oral daily  doxazosin 8milliGRAM(s) Oral at bedtime  fenofibrate Tablet 145milliGRAM(s) Oral daily  ferrous    sulfate 325milliGRAM(s) Oral two times a day with meals  gabapentin 300milliGRAM(s) Oral daily  hydrALAZINE 50milliGRAM(s) Oral daily  HYDROmorphone   Tablet 2milliGRAM(s) Oral every 6 hours  isosorbide   mononitrate ER Tablet (IMDUR) 120milliGRAM(s) Oral daily  pantoprazole  Injectable 40milliGRAM(s) IV Push every 12 hours  pramipexole 0.125milliGRAM(s) Oral daily  senna 2Tablet(s) Oral at bedtime  simvastatin 10milliGRAM(s) Oral at bedtime  multivitamin 1Tablet(s) Oral daily  diltiazem   CD 120milliGRAM(s) Oral daily  heparin  Injectable 5000Unit(s) SubCutaneous every 8 hours  piperacillin/tazobactam IVPB. 3.375Gram(s) IV Intermittent every 8 hours  vancomycin  IVPB 500milliGRAM(s) IV Intermittent every 12 hours  ammonium lactate 12% Lotion 1Application(s) Topical daily  buDESOnide   0.5 milliGRAM(s) Respule 0.5milliGRAM(s) Inhalation two times a day  aspirin  chewable 81milliGRAM(s) Oral daily  metoprolol 25milliGRAM(s) Oral two times a day    MEDICATIONS  (PRN):  acetaminophen   Tablet. 500milliGRAM(s) Oral every 6 hours PRN Mild Pain (1 - 3)  ALBUTerol    90 MICROgram(s) HFA Inhaler 2Puff(s) Inhalation every 6 hours PRN Shortness of Breath  nitroglycerin     SubLingual 0.4milliGRAM(s) SubLingual every 5 minutes PRN Chest Pain  HYDROmorphone  Injectable 0.5milliGRAM(s) IV Push every 6 hours PRN Severe Pain (7 - 10)      Allergies    No Known Allergies    Intolerances        SOCIAL HISTORY:unchanged    FAMILY HISTORY:  No pertinent family history in first degree relatives      REVIEW OF SYSTEMS:    CONSTITUTIONAL: No weakness, fevers or chills  EYES/ENT: No visual changes;  No vertigo or throat pain   NECK: No pain or stiffness  RESPIRATORY: No cough, wheezing, hemoptysis; No shortness of breath  CARDIOVASCULAR: No chest pain or palpitations  GENITOURINARY: No dysuria, frequency or hematuria  NEUROLOGICAL: No numbness or weakness  SKIN: No itching, burning, rashes, or lesions   All other review of systems is negative unless indicated above.    Vital Signs Last 24 Hrs  T(C): 37.2, Max: 37.2 (06-25 @ 11:53)  T(F): 99, Max: 99 (06-25 @ 11:53)  HR: 68 (68 - 85)  BP: 157/44 (157/44 - 165/38)  BP(mean): --  RR: 18 (18 - 18)  SpO2: 100% (96% - 100%)    PHYSICAL EXAM:    Constitutional: NAD, well-developed  HEENT: EOMI, throat clear  Neck: No LAD, supple  Respiratory: CTA and P  Cardiovascular: S1 and S2, RRR, no M  Gastrointestinal: BS+, soft, NT/ND, neg HSM,  Extremities: No peripheral edema, neg clubing, cyanosis  Vascular: 2+ peripheral pulses  Neurological: A/O x 3, no focal deficits  Psychiatric: Normal mood, normal affect  Skin: No rashes    LABS:  CBC Full  -  ( 25 Jun 2017 07:36 )  WBC Count : 6.8 K/uL  Hemoglobin : 8.7 g/dL  Hematocrit : 27.2 %  Platelet Count - Automated : 284 K/uL  Mean Cell Volume : 89.3 fl  Mean Cell Hemoglobin : 28.4 pg  Mean Cell Hemoglobin Concentration : 31.8 gm/dL  Auto Neutrophil # : x  Auto Lymphocyte # : x  Auto Monocyte # : x  Auto Eosinophil # : x  Auto Basophil # : x  Auto Neutrophil % : x  Auto Lymphocyte % : x  Auto Monocyte % : x  Auto Eosinophil % : x  Auto Basophil % : x    06-25    146<H>  |  113<H>  |  27<H>  ----------------------------<  102<H>  3.8   |  28  |  1.17    Ca    7.7<L>      25 Jun 2017 07:36              RADIOLOGY & ADDITIONAL STUDIES:

## 2017-06-25 NOTE — PROGRESS NOTE ADULT - SUBJECTIVE AND OBJECTIVE BOX
82 year old male is seen bedside for followup of RLE necrotic wounds and partial tear of right Achilles tendon. Pt was admitted on 6/17/17 for GI bleed. Pt states he has had chronic ulcers on his right lower leg and foot for the past year and has been treated by Dr. Clement. States he had a right groin wound debrided in the OR with placement of wound VAC on 6/20. States the pain in his right lower leg and foot has decreased. States he has minimal pain in his left heel. States he is unable to walk due to the extensive wounds on his right lower leg and foot. Denies any f/c/n/v/sob. Nurse states she changed dressing this morning on the RLE when Dr. Clement saw the pt. Pt states he is discussing RLE AKA with family currently and will let Dr. Clement know of decision.    PMHx: CAD s/p stents (LAD, RCA bare metal around 9/16),PVD (RLE stent/ angioplasty and surgical debridment by Dr Siu 5/20 ) A.Fib not on anticoagulation due to GI bleeding, Hypertension, COPD on home O2 2L, SHAYY on nocturnal BIPAP, ex-smoker (smoked 1ppd X 50 years, quit 22 years ago),  Chronic diastolic CHF, Hyperlipidemia, PVD, Iron deficiency anemia, Chronic back pain, Gout, BPH, CKD III with baseline Cr 2.    MEDICATIONS:  MEDICATIONS  (STANDING):  allopurinol 100milliGRAM(s) Oral daily  doxazosin 8milliGRAM(s) Oral at bedtime  fenofibrate Tablet 145milliGRAM(s) Oral daily  ferrous    sulfate 325milliGRAM(s) Oral two times a day with meals  gabapentin 300milliGRAM(s) Oral daily  hydrALAZINE 50milliGRAM(s) Oral daily  HYDROmorphone   Tablet 2milliGRAM(s) Oral every 6 hours  isosorbide   mononitrate ER Tablet (IMDUR) 120milliGRAM(s) Oral daily  pantoprazole  Injectable 40milliGRAM(s) IV Push every 12 hours  pramipexole 0.125milliGRAM(s) Oral daily  senna 2Tablet(s) Oral at bedtime  simvastatin 10milliGRAM(s) Oral at bedtime  multivitamin 1Tablet(s) Oral daily  diltiazem   CD 120milliGRAM(s) Oral daily  heparin  Injectable 5000Unit(s) SubCutaneous every 8 hours  piperacillin/tazobactam IVPB. 3.375Gram(s) IV Intermittent every 8 hours  vancomycin  IVPB 500milliGRAM(s) IV Intermittent every 12 hours  ammonium lactate 12% Lotion 1Application(s) Topical daily  buDESOnide   0.5 milliGRAM(s) Respule 0.5milliGRAM(s) Inhalation two times a day  aspirin  chewable 81milliGRAM(s) Oral daily  metoprolol 25milliGRAM(s) Oral two times a day    Allergies: NKFDA    Vital Signs Last 24 Hrs  T(C): 37.2, Max: 37.2 (06-25 @ 11:53)  T(F): 99, Max: 99 (06-25 @ 11:53)  HR: 68 (68 - 85)  BP: 157/44 (157/44 - 165/38)  BP(mean): --  RR: 18 (18 - 18)  SpO2: 100% (96% - 100%)    I&O's Summary  I & Os for 24h ending 25 Jun 2017 07:00  =============================================  IN: 200 ml / OUT: 525 ml / NET: -325 ml    I & Os for current day (as of 25 Jun 2017 13:12)  =============================================  IN: 800 ml / OUT: 300 ml / NET: 500 ml    PHYSICAL EXAM:  Constitutional: NAD, awake and alert, well-developed  Extremities:   Vascular- Non palpable DP and PT pulses of the right foot.  Neuro- Protective sensation is decreased.  Derm- Ulcerations noted to be diffusely spread on the dorsal right foot and postero lateral right lower leg. Necrotic, gangrenous areas noted of the lateral right lower leg, right posteroplantar heel, dorsal right foot and anterior right ankle. Necrotic areas of the lateral aspect of right lower leg is deep to the level of muscle. Maceration noted around the right heel wound. Malodor present. No purulence noted. Dry gangrene noted of the dorsal aspects of the right foot toes. Eschar like ulcer noted on the poster plantar left heel. No fluctuance noted of the left foot.  Ortho- Pain upon palpation of the right lower leg and foot. Able to actively lift right lower leg with pain.      LABS: All Labs Reviewed:                        8.7    6.8   )-----------( 284      ( 25 Jun 2017 07:36 )             27.2     06-25    146<H>  |  113<H>  |  27<H>  ----------------------------<  102<H>  3.8   |  28  |  1.17    Ca    7.7<L>      25 Jun 2017 07:36  Culture - Other (06.17.17 @ 12:50)    Specimen Source: .Other right groin wound    Culture Results:   Moderate Mixed gram negative rods  Few Enterococcus species  Rare Corynebacterium species    Culture - Blood (06.17.17 @ 03:54)    Specimen Source: .Blood Blood    Culture Results:   No growth at 5 days.      RADIOLOGY/EKG:    EXAM:  LWR EXT (MRI) RT W O CON                            PROCEDURE DATE:  06/18/2017        INTERPRETATION:  LWR EXT (MRI) RT W O CON dated 6/18/2017 11:50 AM     INDICATION: Pain and swelling    TECHNIQUE: Multi-sequential, multiplanar MRI imaging of the ankle and   hindfoot was performed per standard protocol.     COMPARISON: None available.    FINDINGS: Evaluation of the soft tissues demonstrates a large posterior   skin ulcer measuring approximately 4.5 x 5.2 cm. Deep to this skin ulcer,   there is nonspecific soft tissue swelling and edema. There is tracking   edema from the ulcer to the proximal calcaneus extending towards the   medial aspect of the calcaneus suspicious for small abscess that measures   1.2 x 2.0 x 3.2 cm (series 10, image 13 and series 8, image 10). There is   partial tearing of the lateral aspect of the Achilles tendon which   closely approximates the skin ulcer and may be exposed. There is partial   tearing of the central plantar fascial cord and full-thickness tear of   the lateral plantar fascial cord at its origin on the calcaneus. The   remaining tendons are preserved. There is no tenosynovitis appreciated.    Evaluation of the bone marrow signal demonstrates erosive change   involving the lateral calcaneus. This erosion measures 1.1 cm. There is   abnormal decreased T1 signal with edema in the posterior aspect of the   calcaneus adjacent to the origin compatible with osteomyelitis. There is   a curvilinear focus of decreased T1 signal that measures up to 1.8 cm   (series 7, image six) within the posterior calcaneus suspicious for an   area of osteonecrosis. Patchy appearance of the bone marrow signal   throughout the foot as can be seen in the setting of decreased bone   mineralization. The tibiotalar, posterior subtalar, middle subtalar,   calcaneocuboid, and talonavicular joints are intact. There is relative   preservation of the midfoot joint spaces. No fracture is identified.    There is no soft tissue mass. The neurovascular structures are relatively   preserved.      IMPRESSION:   1.  Large skin ulcer at the posterior aspect of the heel.  2.  Fluid tracking from the skin ulcer towards the medial calcaneus   suspicious for small abscess.  3.  Osteomyelitis of the posterior calcaneus.  4.  Osteonecrosis within the posterior calcaneus.  5.  Partial tearing of the lateral Achilles insertion.  6.  Full-thickness tearing of the lateral cord of the plantar fascia with   partial tear of the central cord.

## 2017-06-25 NOTE — PROGRESS NOTE ADULT - ASSESSMENT
82 year old male with a PMH of CAD s/p stents (LAD, RCA bare metal around 9/16),PVD (RLE stent/ angioplasty and surgical debridment by Dr Siu 5/20 ) A.Fib not on anticoagulation due to GI bleeding, Hypertension, COPD on home O2 2L, SHAYY on nocturnal BIPAP, ex-smoker (smoked 1ppd X 50 years, quit 22 years ago),  Chronic diastolic CHF, Hyperlipidemia, PVD, Iron deficiency anemia, Chronic back pain, Gout, BPH, CKD III with baseline Cr 2.  is seen for    1. Partial tear of Achilles tendon, right; mixed necrotic granular ulcerations of the right lower leg and foot  -Pt was seen and examined. Plan was discussed with attending Dr. Fermin.  -MRI of RLE reviewed; positive for OM of the posterior calcaneus and partial tear of the Achilles tendon.  -Llocal wound care consisting of betadine to necrotic right heel, xeroform and DSD to right lower leg and foot and betadine to left heel with allevyn pad per Dr. Clement's recommendations, appreciated. Any surgical plan for RLE per Dr. Clement.  -No acute intervention for Achilles partial tear at this time due to the extensive RLE ulcers.  -Continue use of B/L zflex boots to offload B/L heels. Z-flex boots reapplied.  -Recommend IVabx per ID, appreciated.  -Podiatry to follow.    2. Other issues  -Care per Medicine, appreciated.

## 2017-06-25 NOTE — PROGRESS NOTE ADULT - ASSESSMENT
Anemia–likely due to anemia of chronic disease and contributing factor possibly from GI source as well.  Would follow serial H&H's.  SP PRBC for surg and hct inc, and dec likely post op but will follow HCT  His hematocrit appears to be stable in the hospital. I had discussion with the patient and his daughter. For now, we will hold off on endoscopy as he is at high risk. However, if there is plan for vascular intervention and increasing  antiplatelet agents, we may want cross the bridge of endoscopic evaluation again.  restarted iron and vida      CAD: ASA restarted at low dose  DW pt again  DW pt  risk MI vs risk bleed        Would clinically follow and ascertain for evidence of significant bleeding.     Would continue proton pump inhibitor twice a day for now.

## 2017-06-26 LAB
ANION GAP SERPL CALC-SCNC: 6 MMOL/L — SIGNIFICANT CHANGE UP (ref 5–17)
APTT BLD: 41.8 SEC — HIGH (ref 27.5–37.4)
BLD GP AB SCN SERPL QL: SIGNIFICANT CHANGE UP
BUN SERPL-MCNC: 30 MG/DL — HIGH (ref 7–23)
CALCIUM SERPL-MCNC: 7.8 MG/DL — LOW (ref 8.5–10.1)
CHLORIDE SERPL-SCNC: 112 MMOL/L — HIGH (ref 96–108)
CO2 SERPL-SCNC: 27 MMOL/L — SIGNIFICANT CHANGE UP (ref 22–31)
CREAT SERPL-MCNC: 1.25 MG/DL — SIGNIFICANT CHANGE UP (ref 0.5–1.3)
GLUCOSE SERPL-MCNC: 98 MG/DL — SIGNIFICANT CHANGE UP (ref 70–99)
HCT VFR BLD CALC: 26.6 % — LOW (ref 39–50)
HGB BLD-MCNC: 8.6 G/DL — LOW (ref 13–17)
MCHC RBC-ENTMCNC: 29.1 PG — SIGNIFICANT CHANGE UP (ref 27–34)
MCHC RBC-ENTMCNC: 32.3 GM/DL — SIGNIFICANT CHANGE UP (ref 32–36)
MCV RBC AUTO: 90.2 FL — SIGNIFICANT CHANGE UP (ref 80–100)
PLATELET # BLD AUTO: 284 K/UL — SIGNIFICANT CHANGE UP (ref 150–400)
POTASSIUM SERPL-MCNC: 3.9 MMOL/L — SIGNIFICANT CHANGE UP (ref 3.5–5.3)
POTASSIUM SERPL-SCNC: 3.9 MMOL/L — SIGNIFICANT CHANGE UP (ref 3.5–5.3)
RBC # BLD: 2.95 M/UL — LOW (ref 4.2–5.8)
RBC # FLD: 16 % — HIGH (ref 10.3–14.5)
SODIUM SERPL-SCNC: 145 MMOL/L — SIGNIFICANT CHANGE UP (ref 135–145)
TYPE + AB SCN PNL BLD: SIGNIFICANT CHANGE UP
WBC # BLD: 7.2 K/UL — SIGNIFICANT CHANGE UP (ref 3.8–10.5)
WBC # FLD AUTO: 7.2 K/UL — SIGNIFICANT CHANGE UP (ref 3.8–10.5)

## 2017-06-26 RX ORDER — SODIUM CHLORIDE 9 MG/ML
1000 INJECTION, SOLUTION INTRAVENOUS
Qty: 0 | Refills: 0 | Status: DISCONTINUED | OUTPATIENT
Start: 2017-06-27 | End: 2017-06-27

## 2017-06-26 RX ADMIN — Medication 1 TABLET(S): at 11:34

## 2017-06-26 RX ADMIN — HYDROMORPHONE HYDROCHLORIDE 2 MILLIGRAM(S): 2 INJECTION INTRAMUSCULAR; INTRAVENOUS; SUBCUTANEOUS at 11:33

## 2017-06-26 RX ADMIN — HEPARIN SODIUM 5000 UNIT(S): 5000 INJECTION INTRAVENOUS; SUBCUTANEOUS at 05:38

## 2017-06-26 RX ADMIN — Medication 100 MILLIGRAM(S): at 05:31

## 2017-06-26 RX ADMIN — Medication 50 MILLIGRAM(S): at 05:41

## 2017-06-26 RX ADMIN — HEPARIN SODIUM 5000 UNIT(S): 5000 INJECTION INTRAVENOUS; SUBCUTANEOUS at 21:09

## 2017-06-26 RX ADMIN — HYDROMORPHONE HYDROCHLORIDE 2 MILLIGRAM(S): 2 INJECTION INTRAMUSCULAR; INTRAVENOUS; SUBCUTANEOUS at 23:58

## 2017-06-26 RX ADMIN — Medication 500 MILLIGRAM(S): at 11:41

## 2017-06-26 RX ADMIN — HYDROMORPHONE HYDROCHLORIDE 2 MILLIGRAM(S): 2 INJECTION INTRAMUSCULAR; INTRAVENOUS; SUBCUTANEOUS at 17:08

## 2017-06-26 RX ADMIN — Medication 25 MILLIGRAM(S): at 05:43

## 2017-06-26 RX ADMIN — HYDROMORPHONE HYDROCHLORIDE 2 MILLIGRAM(S): 2 INJECTION INTRAMUSCULAR; INTRAVENOUS; SUBCUTANEOUS at 13:00

## 2017-06-26 RX ADMIN — Medication 120 MILLIGRAM(S): at 05:42

## 2017-06-26 RX ADMIN — Medication 145 MILLIGRAM(S): at 11:36

## 2017-06-26 RX ADMIN — Medication 0.5 MILLIGRAM(S): at 20:22

## 2017-06-26 RX ADMIN — HYDROMORPHONE HYDROCHLORIDE 2 MILLIGRAM(S): 2 INJECTION INTRAMUSCULAR; INTRAVENOUS; SUBCUTANEOUS at 00:13

## 2017-06-26 RX ADMIN — PANTOPRAZOLE SODIUM 40 MILLIGRAM(S): 20 TABLET, DELAYED RELEASE ORAL at 17:07

## 2017-06-26 RX ADMIN — Medication 0.5 MILLIGRAM(S): at 07:48

## 2017-06-26 RX ADMIN — ZINC SULFATE TAB 220 MG (50 MG ZINC EQUIVALENT) 220 MILLIGRAM(S): 220 (50 ZN) TAB at 11:37

## 2017-06-26 RX ADMIN — PANTOPRAZOLE SODIUM 40 MILLIGRAM(S): 20 TABLET, DELAYED RELEASE ORAL at 05:37

## 2017-06-26 RX ADMIN — Medication 81 MILLIGRAM(S): at 11:35

## 2017-06-26 RX ADMIN — SIMVASTATIN 10 MILLIGRAM(S): 20 TABLET, FILM COATED ORAL at 21:11

## 2017-06-26 RX ADMIN — Medication 8 MILLIGRAM(S): at 21:09

## 2017-06-26 RX ADMIN — HYDROMORPHONE HYDROCHLORIDE 2 MILLIGRAM(S): 2 INJECTION INTRAMUSCULAR; INTRAVENOUS; SUBCUTANEOUS at 06:46

## 2017-06-26 RX ADMIN — GABAPENTIN 300 MILLIGRAM(S): 400 CAPSULE ORAL at 11:34

## 2017-06-26 RX ADMIN — HEPARIN SODIUM 5000 UNIT(S): 5000 INJECTION INTRAVENOUS; SUBCUTANEOUS at 14:36

## 2017-06-26 RX ADMIN — Medication 100 MILLIGRAM(S): at 19:37

## 2017-06-26 RX ADMIN — Medication 100 MILLIGRAM(S): at 11:34

## 2017-06-26 RX ADMIN — Medication 325 MILLIGRAM(S): at 17:07

## 2017-06-26 RX ADMIN — Medication 325 MILLIGRAM(S): at 09:38

## 2017-06-26 RX ADMIN — PIPERACILLIN AND TAZOBACTAM 25 GRAM(S): 4; .5 INJECTION, POWDER, LYOPHILIZED, FOR SOLUTION INTRAVENOUS at 14:37

## 2017-06-26 RX ADMIN — HYDROMORPHONE HYDROCHLORIDE 2 MILLIGRAM(S): 2 INJECTION INTRAMUSCULAR; INTRAVENOUS; SUBCUTANEOUS at 05:42

## 2017-06-26 RX ADMIN — ISOSORBIDE MONONITRATE 120 MILLIGRAM(S): 60 TABLET, EXTENDED RELEASE ORAL at 14:28

## 2017-06-26 RX ADMIN — PIPERACILLIN AND TAZOBACTAM 25 GRAM(S): 4; .5 INJECTION, POWDER, LYOPHILIZED, FOR SOLUTION INTRAVENOUS at 06:52

## 2017-06-26 RX ADMIN — PRAMIPEXOLE DIHYDROCHLORIDE 0.12 MILLIGRAM(S): 0.12 TABLET ORAL at 11:34

## 2017-06-26 RX ADMIN — Medication 25 MILLIGRAM(S): at 17:07

## 2017-06-26 RX ADMIN — Medication 1 APPLICATION(S): at 11:41

## 2017-06-26 RX ADMIN — PIPERACILLIN AND TAZOBACTAM 25 GRAM(S): 4; .5 INJECTION, POWDER, LYOPHILIZED, FOR SOLUTION INTRAVENOUS at 21:09

## 2017-06-26 NOTE — PROGRESS NOTE ADULT - SUBJECTIVE AND OBJECTIVE BOX
Patient is a 82y old  Male who presents with a chief complaint of Right Leg Venous Ulcers (17 Jun 2017 07:52)      HPI:  82 year old male with history of CAD s/p stents (LAD, RCA bare metal around 9/16),PVD (RLE stent/ angioplasty and surgical debridment by Dr Siu 5/20 ) Brianna not on anticoagulation due to GI bleeding, Hypertension, COPD on home O2 2L, SHAYY on nocturnal BIPAP, ex-smoker (smoked 1ppd X 50 years, quit 22 years ago),  Chronic diastolic CHF, Hyperlipidemia, PVD, Iron deficiency anemia, Chronic back pain, Gout, BPH, CKD III with baseline Cr 2. Was ecently admitted to  on  4/17 with anemia of GI bleeding, RLE and RUE DVTs,( received pRBCs and IVC filter discharged to rehab with aspirin) was readmitted to   ER on 5/4/17 for worsening anemia with Hb 6, worsening leg pain from ulcers and worsening renal function Cr 3.99 now presents from [facility] with anemia (7.7 on labs today decreasing from 9.5 over past few weeks). Pt c/o gradual onset  progressive fatigue over the last couple of weeks. Patient is unable to ambulate because of his leg cellulitis and ulcers. He denies any shortness of breath, palpitations / chest pain / light headedness. According to the daughter the attendants at [facility] did notice dark stools for the last couple of days. Patient denies any abdominal pain or change in bowel or urinary habits.  In ED, + guaiac. (17 Jun 2017 00:37)  Patient is comfortable. Tolerating by mouth intake. Negative nausea vomiting.  Had some reflux last night but this is improved.  History per patient and daughter        PAST MEDICAL & SURGICAL HISTORY:  Sepsis, due to unspecified organism: 2/2 poorly healing wounds b/l  BPH (benign prostatic hypertrophy)  Hyperlipemia  Coronary artery disease  Hypertension  Dyspepsia: On moderate exertion.  Sleep apnea, obstructive: Requires home 02 therapy, and treatment with BIPAP  Atelectasis  Pleural effusion, bilateral  Respiratory failure  Peripheral edema  CRI (chronic renal insufficiency)  Gout  CRF (chronic renal failure)  Benign prostatic hypertrophy  Spinal stenosis  Hypercholesterolemia  GERD (gastroesophageal reflux disease)  CAD (coronary artery disease)  Hypertension  S/P angioplasty with stent  Cataract of left eye  Prostate: Surgery green light procedure.  S/P rotator cuff surgery: Right  S/P angioplasty  Rotator cuff tear, right: repair      MEDICATIONS  (STANDING):  allopurinol 100milliGRAM(s) Oral daily  doxazosin 8milliGRAM(s) Oral at bedtime  fenofibrate Tablet 145milliGRAM(s) Oral daily  ferrous    sulfate 325milliGRAM(s) Oral two times a day with meals  gabapentin 300milliGRAM(s) Oral daily  hydrALAZINE 50milliGRAM(s) Oral daily  HYDROmorphone   Tablet 2milliGRAM(s) Oral every 6 hours  isosorbide   mononitrate ER Tablet (IMDUR) 120milliGRAM(s) Oral daily  pantoprazole  Injectable 40milliGRAM(s) IV Push every 12 hours  pramipexole 0.125milliGRAM(s) Oral daily  senna 2Tablet(s) Oral at bedtime  simvastatin 10milliGRAM(s) Oral at bedtime  multivitamin 1Tablet(s) Oral daily  diltiazem   CD 120milliGRAM(s) Oral daily  heparin  Injectable 5000Unit(s) SubCutaneous every 8 hours  piperacillin/tazobactam IVPB. 3.375Gram(s) IV Intermittent every 8 hours  vancomycin  IVPB 500milliGRAM(s) IV Intermittent every 12 hours  ammonium lactate 12% Lotion 1Application(s) Topical daily  buDESOnide   0.5 milliGRAM(s) Respule 0.5milliGRAM(s) Inhalation two times a day  aspirin  chewable 81milliGRAM(s) Oral daily  metoprolol 25milliGRAM(s) Oral two times a day  zinc sulfate 220milliGRAM(s) Oral daily  ascorbic acid 500milliGRAM(s) Oral daily    MEDICATIONS  (PRN):  acetaminophen   Tablet. 500milliGRAM(s) Oral every 6 hours PRN Mild Pain (1 - 3)  ALBUTerol    90 MICROgram(s) HFA Inhaler 2Puff(s) Inhalation every 6 hours PRN Shortness of Breath  nitroglycerin     SubLingual 0.4milliGRAM(s) SubLingual every 5 minutes PRN Chest Pain  HYDROmorphone  Injectable 0.5milliGRAM(s) IV Push every 6 hours PRN Severe Pain (7 - 10)      Allergies    No Known Allergies    Intolerances        SOCIAL HISTORY:unchanged    FAMILY HISTORY:  No pertinent family history in first degree relatives      REVIEW OF SYSTEMS:    CONSTITUTIONAL: No weakness, fevers or chills  EYES/ENT: No visual changes;  No vertigo or throat pain   NECK: No pain or stiffness  RESPIRATORY: No cough, wheezing, hemoptysis; No shortness of breath  CARDIOVASCULAR: No chest pain or palpitations  GENITOURINARY: No dysuria, frequency or hematuria  NEUROLOGICAL: No numbness or weakness  SKIN: No itching, burning, rashes, or lesions   All other review of systems is negative unless indicated above.    Vital Signs Last 24 Hrs  T(C): 37.3, Max: 37.3 (06-26 @ 17:20)  T(F): 99.1, Max: 99.1 (06-26 @ 17:20)  HR: 81 (66 - 81)  BP: 157/48 (154/55 - 163/45)  BP(mean): --  RR: 18 (16 - 18)  SpO2: 97% (97% - 99%)    PHYSICAL EXAM:    Constitutional: NAD, well-developed  HEENT: EOMI, throat clear  Neck: No LAD, supple  Respiratory: CTA and P  Cardiovascular: S1 and S2, RRR, no M  Gastrointestinal: BS+, soft, NT/ND, neg HSM,  Extremities: pos peripheral edema, neg clubing, cyanosis    Neurological: A/O x 3, no focal deficits  Psychiatric: Normal mood, normal affect  Skin: No rashes    LABS:  CBC Full  -  ( 26 Jun 2017 07:16 )  WBC Count : 7.2 K/uL  Hemoglobin : 8.6 g/dL  Hematocrit : 26.6 %  Platelet Count - Automated : 284 K/uL  Mean Cell Volume : 90.2 fl  Mean Cell Hemoglobin : 29.1 pg  Mean Cell Hemoglobin Concentration : 32.3 gm/dL  Auto Neutrophil # : x  Auto Lymphocyte # : x  Auto Monocyte # : x  Auto Eosinophil # : x  Auto Basophil # : x  Auto Neutrophil % : x  Auto Lymphocyte % : x  Auto Monocyte % : x  Auto Eosinophil % : x  Auto Basophil % : x    06-26    145  |  112<H>  |  30<H>  ----------------------------<  98  3.9   |  27  |  1.25    Ca    7.8<L>      26 Jun 2017 07:16      PTT - ( 26 Jun 2017 07:16 )  PTT:41.8 sec        RADIOLOGY & ADDITIONAL STUDIES:

## 2017-06-26 NOTE — PROGRESS NOTE ADULT - SUBJECTIVE AND OBJECTIVE BOX
Pt has been seen and examined with FP resident, resident supervised agree with a/p       Patient is a 82y old  Male who presents with a chief complaint of Sent from [facility] rehab because of low hemoglobin 7.7 (17 Jun 2017 00:37)        HPI:  82 year old male with history of CAD s/p stents (LAD, RCA bare metal around 9/16),PVD (RLE stent/ angioplasty and surgical debridment by Dr Siu 5/20 ) A.Fib not on anticoagulation due to GI bleeding, Hypertension, COPD on home O2 2L, SHAYY on nocturnal BIPAP, ex-smoker (smoked 1ppd X 50 years, quit 22 years ago),  Chronic diastolic CHF, Hyperlipidemia, PVD, Iron deficiency anemia, Chronic back pain, Gout, BPH, CKD III with baseline Cr 2. Was ecently admitted to  on  4/17 with anemia of GI bleeding, RLE and RUE DVTs,( received pRBCs and IVC filter discharged to rehab with aspirin) was readmitted to   ER on 5/4/17 for worsening anemia with Hb 6, worsening leg pain from ulcers and worsening renal function Cr 3.99 now presents from [facility] with anemia (7.7 on labs today decreasing from 9.5 over past few weeks). Pt c/o gradual onset  progressive fatigue over the last couple of weeks. Patient is unable to ambulate because of his leg cellulitis and ulcers. He denies any shortness of breath, palpitations / chest pain / light headedness. According to the daughter the attendants at [facility] did notice dark stools for the last couple of days. Patient denies any abdominal pain or change in bowel or urinary habits.  In ED, + guaiac. (17 Jun 2017 00:37)        PHYSICAL EXAM:  Vital Signs Last 24 Hrs  T(C): 36.9, Max: 37 (06-25 @ 17:01)  T(F): 98.5, Max: 98.6 (06-25 @ 17:01)  HR: 69 (66 - 70)  BP: 155/45 (150/43 - 163/45)  BP(mean): --  RR: 16 (16 - 18)  SpO2: 98% (98% - 99%)  general- comfortable   -rs-aeeb,cta  -cvs-s1s2 normal   -p/a-soft,bs+  -extremity- right leg groin wound and leg wound noted, eschar present in right heel and right side of leg  -cns- non focal         A/P    #Anemia-Anemia due to chronic disease or gi bleed   -stable     #fever- possibly from wound infection - OM of calcaneum  -possible amputation procedure     #DVT pr-heparin

## 2017-06-26 NOTE — PROGRESS NOTE ADULT - ASSESSMENT
82 year old male with a PMH of CAD s/p stents (LAD, RCA bare metal around 9/16),PVD (RLE stent/ angioplasty and surgical debridment by Dr Siu 5/20 ) A.Fib not on anticoagulation due to GI bleeding, Hypertension, COPD on home O2 2L, SHAYY on nocturnal BIPAP, ex-smoker (smoked 1ppd X 50 years, quit 22 years ago),  Chronic diastolic CHF, Hyperlipidemia, PVD, Iron deficiency anemia, Chronic back pain, Gout, BPH, CKD III with baseline Cr 2.  is seen for    1. Partial tear of Achilles tendon, right; mixed necrotic granular ulcerations of the right lower leg and foot  -Pt was seen and examined. Plan was discussed with attending Dr. Fermin.  -MRI of RLE reviewed; positive for OM of the posterior calcaneus and partial tear of the Achilles tendon.  -Local wound care consisting of betadine to necrotic right heel, xeroform and DSD to right lower leg and foot and betadine to left heel with allevyn pad per Dr. Clement's recommendations, appreciated. Surgical planning in progress for RLE AKA with Dr. Clement.  -No acute intervention for Achilles partial tear at this time due to the extensive RLE ulcers.  -Continue use of B/L zflex boots to offload B/L heels. Z-flex boots reapplied.  -Recommend IVabx per ID, appreciated.  -Podiatry to follow.    2. Other issues  -Care per Medicine, appreciated.

## 2017-06-26 NOTE — PROGRESS NOTE ADULT - SUBJECTIVE AND OBJECTIVE BOX
Chief Complaint/Reason for Admission: Sent from [facility] rehab because of low hemoglobin 7.7	  History of Present Illness: 	  82 year old male with history of CAD s/p stents (LAD, RCA bare metal around 9/16),PVD (RLE stent/ angioplasty and surgical debridment by Dr Siu 5/20 ) Brianna not on anticoagulation due to GI bleeding, Hypertension, COPD on home O2 2L, SHAYY on nocturnal BIPAP, ex-smoker (smoked 1ppd X 50 years, quit 22 years ago),  Chronic diastolic CHF, Hyperlipidemia, PVD, Iron deficiency anemia, Chronic back pain, Gout, BPH, CKD III with baseline Cr 2. Was ecently admitted to  on  4/17 with anemia of GI bleeding, RLE and RUE DVTs,( received pRBCs and IVC filter discharged to rehab with aspirin) was readmitted to   ER on 5/4/17 for worsening anemia with Hb 6, worsening leg pain from ulcers and worsening renal function Cr 3.99 now presents from [facility] with anemia (7.7 on labs today decreasing from 9.5 over past few weeks). Pt c/o gradual onset  progressive fatigue over the last couple of weeks. Patient is unable to ambulate because of his leg cellulitis and ulcers. He denies any shortness of breath, palpitations / chest pain / light headedness. According to the daughter the attendants at [facility] did notice dark stools for the last couple of days. Patient denies any abdominal pain or change in bowel or urinary habits.  In ED, + guaiac.     6/19 - Patient seen and examined. Reports 6 loose BM since this morning.  Patient has been placed on isolation until Cdiff has been rulled out.  Afebrile. hemodynamically stable.     6/20 - Patient going for muscle flap of groin area tonight after 1u PRBCs for anem.  Hemodynamically stable. Patient had low grade temp overnight.  On Vanco/Zosyn #3.  Tolerating abx well.  No Diarrhea, no rash.     6/21 - Patient seen and examined with daughter at bedside eating breakfast.  S/P right femoral wound debridement Day #1.  Wound vac in place and functional.  patient is feeling well with no complaints at this time. Has elevated BP, Will increased dose of Metoprolol to BID.  Podiatry consult placed to evaluate right achilles tendon rupture repair. Hemodynamically stable, afebrile. On Vanco/Zosyn day #4.    6/22 - Patient seen and examined.  Hemodynamically stable. NAD, Afebrile. On Vanco.Zosyn Day #5.  patient is feeling well.  Has been seen and Dr. Cardenas and may require further debridement and or AKA.  Patient is in pain despite being on Dilaudid standing q6hr. Will add Dilaudid for breakthrough pain.     6/23 - Patient seen and examined with daughter at bedside.  Had a long conversation with patient and daughter about long term goals of care.  Wound debridement vs AKA, Prosthetics, Physical therapy.  Patient is very deconditioned from chronic right LE wounds.  Patient would most likely benifit from AKA in the long term.  Hemodynamically stable.  afebrile. No overnight events.  Tolerating abx well.     6/24 - Patient seen and examined with daughter at bedside.  Discussed AKA with patient and daughter.  Patient is leaning towards having the amputation done but will decide in the next few days.  Hemodynamically stable, pain controlled, afebrile.  No overnight events.     6/26 - Patient seen and examined with daughter at bedside.  Patient had discussed AKA with Dr. Clement and has decided on having procedure done.  Time of surgery not yet determined. Hemodynamically stable, afebrile.  Patient is feeling well, tolerating PO and seen by PT.      MEDICATIONS  (STANDING):  allopurinol 100milliGRAM(s) Oral daily  doxazosin 8milliGRAM(s) Oral at bedtime  fenofibrate Tablet 145milliGRAM(s) Oral daily  ferrous    sulfate 325milliGRAM(s) Oral two times a day with meals  gabapentin 300milliGRAM(s) Oral daily  hydrALAZINE 50milliGRAM(s) Oral daily  HYDROmorphone   Tablet 2milliGRAM(s) Oral every 6 hours  isosorbide   mononitrate ER Tablet (IMDUR) 120milliGRAM(s) Oral daily  pantoprazole  Injectable 40milliGRAM(s) IV Push every 12 hours  pramipexole 0.125milliGRAM(s) Oral daily  senna 2Tablet(s) Oral at bedtime  simvastatin 10milliGRAM(s) Oral at bedtime  multivitamin 1Tablet(s) Oral daily  diltiazem   CD 120milliGRAM(s) Oral daily  heparin  Injectable 5000Unit(s) SubCutaneous every 8 hours  piperacillin/tazobactam IVPB. 3.375Gram(s) IV Intermittent every 8 hours  vancomycin  IVPB 500milliGRAM(s) IV Intermittent every 12 hours  ammonium lactate 12% Lotion 1Application(s) Topical daily  buDESOnide   0.5 milliGRAM(s) Respule 0.5milliGRAM(s) Inhalation two times a day  aspirin  chewable 81milliGRAM(s) Oral daily  metoprolol 25milliGRAM(s) Oral two times a day  zinc sulfate 220milliGRAM(s) Oral daily  ascorbic acid 500milliGRAM(s) Oral daily    MEDICATIONS  (PRN):  acetaminophen   Tablet. 500milliGRAM(s) Oral every 6 hours PRN Mild Pain (1 - 3)  ALBUTerol    90 MICROgram(s) HFA Inhaler 2Puff(s) Inhalation every 6 hours PRN Shortness of Breath  nitroglycerin     SubLingual 0.4milliGRAM(s) SubLingual every 5 minutes PRN Chest Pain  HYDROmorphone  Injectable 0.5milliGRAM(s) IV Push every 6 hours PRN Severe Pain (7 - 10)    ICU Vital Signs Last 24 Hrs  T(C): 37.3, Max: 37.3 (06-26 @ 17:20)  T(F): 99.1, Max: 99.1 (06-26 @ 17:20)  HR: 81 (66 - 81)  BP: 157/48 (154/55 - 163/45)  BP(mean): --  ABP: --  ABP(mean): --  RR: 18 (16 - 18)  SpO2: 97% (97% - 99%)    GEN: NAD, comfortable, resting in bed  CV: S1S2, RRR, no mumur  RESP: good air movement, CTABL, no rales, rhonchi or wheezing  ABD: +BS, soft, ND, NT, no guarding, no rigidity  Skin: R groin wound vac in place and functional; distal leg wounds granulating with scattered areas of eschar, tendon on R lateral leg exposed and necrotic; foot intact; L leg with stasis changes but no cellulitis or ulcers  EXT: +2 radial and pedial pulses, no edema, no calve tenderness                                               8.6    7.2   )-----------( 284      ( 26 Jun 2017 07:16 )             26.6     26 Jun 2017 07:16    145    |  112    |  30     ----------------------------<  98     3.9     |  27     |  1.25     Ca    7.8        26 Jun 2017 07:16        PTT - ( 26 Jun 2017 07:16 )  PTT:41.8 sec  CAPILLARY BLOOD GLUCOSE      EXAM:  LWR EXT (MRI) RT W O CON                        PROCEDURE DATE:  06/18/2017    IMPRESSION:   1.  Large skin ulcer at the posterior aspect of the heel.  2.  Fluid tracking from the skin ulcer towards the medial calcaneus   suspicious for small abscess.  3.  Osteomyelitis of the posterior calcaneus.  4.  Osteonecrosis within the posterior calcaneus.  5.  Partial tearing of the lateral Achilles insertion.  6.  Full-thickness tearing of the lateral cord of the plantar fascia with   partial tear of the central cord.

## 2017-06-26 NOTE — PROGRESS NOTE ADULT - SUBJECTIVE AND OBJECTIVE BOX
comfortable    discussed with patient and daugher the option of AKA and they have decided on proceeding this route.    will obtain OR time    MEDICATIONS  (STANDING):  allopurinol 100milliGRAM(s) Oral daily  doxazosin 8milliGRAM(s) Oral at bedtime  fenofibrate Tablet 145milliGRAM(s) Oral daily  ferrous    sulfate 325milliGRAM(s) Oral two times a day with meals  gabapentin 300milliGRAM(s) Oral daily  hydrALAZINE 50milliGRAM(s) Oral daily  HYDROmorphone   Tablet 2milliGRAM(s) Oral every 6 hours  isosorbide   mononitrate ER Tablet (IMDUR) 120milliGRAM(s) Oral daily  pantoprazole  Injectable 40milliGRAM(s) IV Push every 12 hours  pramipexole 0.125milliGRAM(s) Oral daily  senna 2Tablet(s) Oral at bedtime  simvastatin 10milliGRAM(s) Oral at bedtime  multivitamin 1Tablet(s) Oral daily  diltiazem   CD 120milliGRAM(s) Oral daily  heparin  Injectable 5000Unit(s) SubCutaneous every 8 hours  piperacillin/tazobactam IVPB. 3.375Gram(s) IV Intermittent every 8 hours  vancomycin  IVPB 500milliGRAM(s) IV Intermittent every 12 hours  ammonium lactate 12% Lotion 1Application(s) Topical daily  buDESOnide   0.5 milliGRAM(s) Respule 0.5milliGRAM(s) Inhalation two times a day  aspirin  chewable 81milliGRAM(s) Oral daily  metoprolol 25milliGRAM(s) Oral two times a day  zinc sulfate 220milliGRAM(s) Oral daily  ascorbic acid 500milliGRAM(s) Oral daily    MEDICATIONS  (PRN):  acetaminophen   Tablet. 500milliGRAM(s) Oral every 6 hours PRN Mild Pain (1 - 3)  ALBUTerol    90 MICROgram(s) HFA Inhaler 2Puff(s) Inhalation every 6 hours PRN Shortness of Breath  nitroglycerin     SubLingual 0.4milliGRAM(s) SubLingual every 5 minutes PRN Chest Pain  HYDROmorphone  Injectable 0.5milliGRAM(s) IV Push every 6 hours PRN Severe Pain (7 - 10)      Allergies    No Known Allergies    Intolerances        Flatus: [ ] YES [ ] NO             Bowel Movement: [ ] YES [ ] NO  Pain (0-10):            Pain Control Adequate: [ ] YES [ ] NO  Nausea: [ ] YES [ ] NO            Vomiting: [ ] YES [ ] NO  Diarrhea: [ ] YES [ ] NO         Constipation: [ ] YES [ ] NO     Chest Pain: [ ] YES [ ] NO    SOB:  [ ] YES [ ] NO    Vital Signs Last 24 Hrs  T(C): 36.9, Max: 37.2 (06-25 @ 11:53)  T(F): 98.5, Max: 99 (06-25 @ 11:53)  HR: 67 (66 - 70)  BP: 154/55 (150/43 - 163/45)  BP(mean): --  RR: 18 (18 - 18)  SpO2: 99% (98% - 100%)    I&O's Summary    I & Os for current day (as of 26 Jun 2017 08:14)  =============================================  IN: 1340 ml / OUT: 700 ml / NET: 640 ml      Physical Exam:  General: NAD, resting comfortably  Pulmonary: normal resp effort, CTA-B  Cardiovascular: NSR  Abdominal: soft, NT/ND  Extremities: WWP, normal strength  Neuro: A/O x 3, CNs II-XII grossly intact, normal motor/sensation, no focal deficits  Pulses:   Right:                                                                          Left:  FEM [ ]2+ [ ]1+ [ ]doppler                                             FEM [ ]2+ [ ]1+ [ ]doppler    POP [ ]2+ [ ]1+ [ ]doppler                                             POP [ ]2+ [ ]1+ [ ]doppler    DP [ ]2+ [ ]1+ [ ]doppler                                                DP [ ]2+ [ ]1+ [ ]doppler  PT[ ]2+ [ ]1+ [ ]doppler                                                  PT [ ]2+ [ ]1+ [ ]doppler    LABS:                        8.6    7.2   )-----------( 284      ( 26 Jun 2017 07:16 )             26.6     06-26    145  |  112<H>  |  30<H>  ----------------------------<  98  3.9   |  27  |  1.25    Ca    7.8<L>      26 Jun 2017 07:16      PTT - ( 26 Jun 2017 07:16 )  PTT:41.8 sec      CAPILLARY BLOOD GLUCOSE      RADIOLOGY & ADDITIONAL TESTS:

## 2017-06-26 NOTE — PROGRESS NOTE ADULT - SUBJECTIVE AND OBJECTIVE BOX
Patient is a 82y old  Male who presents with a chief complaint of Sent from [facility] because of low hemoglobin 7.7 (2017 00:37)    HPI:  83 y/o male with h/o CAD s/p stents (LAD, RCA bare metal around ), PVD (RLE stent/ angioplasty) complicated with poorly healing right inguinal wound s/p prior surgical debridment (by Dr Siu  ) A.Fib not on anticoagulation due to GI bleeding, Hypertension, COPD on home O2 2L, SHAYY on nocturnal BIPAP, Chronic diastolic CHF, Hyperlipidemia, PVD, Iron deficiency anemia, Chronic back pain, Gout, BPH, CKD III with baseline Cr 2, 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 He has gradual onset  progressive fatigue over the last couple of weeks. Patient is unable to ambulate because of his leg ulcers. In ER, he was started on vancomycin IV and zosyn.    Lying in bed in NAD  No fever or chills  No new complaints  Has right groin wound VAC    MEDICATIONS  (STANDING):  allopurinol 100milliGRAM(s) Oral daily  doxazosin 8milliGRAM(s) Oral at bedtime  fenofibrate Tablet 145milliGRAM(s) Oral daily  ferrous    sulfate 325milliGRAM(s) Oral two times a day with meals  gabapentin 300milliGRAM(s) Oral daily  hydrALAZINE 50milliGRAM(s) Oral daily  HYDROmorphone   Tablet 2milliGRAM(s) Oral every 6 hours  isosorbide   mononitrate ER Tablet (IMDUR) 120milliGRAM(s) Oral daily  pantoprazole  Injectable 40milliGRAM(s) IV Push every 12 hours  pramipexole 0.125milliGRAM(s) Oral daily  senna 2Tablet(s) Oral at bedtime  simvastatin 10milliGRAM(s) Oral at bedtime  multivitamin 1Tablet(s) Oral daily  diltiazem   CD 120milliGRAM(s) Oral daily  heparin  Injectable 5000Unit(s) SubCutaneous every 8 hours  piperacillin/tazobactam IVPB. 3.375Gram(s) IV Intermittent every 8 hours  vancomycin  IVPB 500milliGRAM(s) IV Intermittent every 12 hours  ammonium lactate 12% Lotion 1Application(s) Topical daily  buDESOnide   0.5 milliGRAM(s) Respule 0.5milliGRAM(s) Inhalation two times a day  aspirin  chewable 81milliGRAM(s) Oral daily  metoprolol 25milliGRAM(s) Oral two times a day  zinc sulfate 220milliGRAM(s) Oral daily  ascorbic acid 500milliGRAM(s) Oral daily    MEDICATIONS  (PRN):  acetaminophen   Tablet. 500milliGRAM(s) Oral every 6 hours PRN Mild Pain (1 - 3)  ALBUTerol    90 MICROgram(s) HFA Inhaler 2Puff(s) Inhalation every 6 hours PRN Shortness of Breath  nitroglycerin     SubLingual 0.4milliGRAM(s) SubLingual every 5 minutes PRN Chest Pain  HYDROmorphone  Injectable 0.5milliGRAM(s) IV Push every 6 hours PRN Severe Pain (7 - 10)      Vital Signs Last 24 Hrs  T(C): 36.9, Max: 37 ( @ 17:01)  T(F): 98.5, Max: 98.6 ( @ 17:01)  HR: 69 (66 - 70)  BP: 155/45 (150/43 - 163/45)  BP(mean): --  RR: 16 (16 - 18)  SpO2: 98% (98% - 99%)    Physical Exam:        Constitutional: frail looking  HEENT: NC/AT, EOMI, PERRLA  Neck: supple  Back: no tenderness  Respiratory: decreased BS at bases  Cardiovascular: S1S2 regular, no murmurs  Abdomen: soft, not tender, not distended, positive BS  Genitourinary: right inguinal open wound with VAC in place  Rectal: deferred  Musculoskeletal: no muscle tenderness, no joint swelling or tenderness  Extremities: 1+ pedal edema; right inguinal open wound - deep; scant discharge; right heel necrotic ulcer  Neurological: AxOx3, moving all extremities, no focal deficits  Skin: no rashes    Labs:                        8.6    7.2   )-----------( 284      ( 2017 07:16 )             26.6     06-    145  |  112<H>  |  30<H>  ----------------------------<  98  3.9   |  27  |  1.25    Ca    7.8<L>      2017 07:16                          10.2   8.0   )-----------( 366      ( 2017 06:22 )             32.7     06-21    148<H>  |  115<H>  |  29<H>  ----------------------------<  133<H>  3.8   |  23  |  1.29    Ca    7.7<L>      2017 06:22                          8.3    6.5   )-----------( 367      ( 2017 06:25 )             25.6     06-20    146<H>  |  113<H>  |  32<H>  ----------------------------<  118<H>  3.2<L>   |  27  |  1.33<H>    Ca    7.9<L>      2017 06:25         Vancomycin Level, Trough: 14.2 ug/mL ( @ 05:00)      Cultures:                       8.1    7.0   )-----------( 331      ( 2017 05:00 )             25.2     -    151<H>  |  116<H>  |  40<H>  ----------------------------<  97  3.6   |  27  |  1.38<H>    Ca    8.1<L>      2017 05:00         Vancomycin Level, Trough: 14.2 ug/mL ( @ 05:00)                          8.2    8.5   )-----------( 312      ( 2017 03:54 )             25.0     06-17    148<H>  |  114<H>  |  53<H>  ----------------------------<  129<H>  3.8   |  28  |  1.65<H>    Ca    8.2<L>      2017 03:54  Phos  1.9     06-17  Mg     2.1     06-17    TPro  5.2<L>  /  Alb  1.7<L>  /  TBili  0.3  /  DBili  x   /  AST  20  /  ALT  12  /  AlkPhos  57       LIVER FUNCTIONS - ( 2017 03:54 )  Alb: 1.7 g/dL / Pro: 5.2 gm/dL / ALK PHOS: 57 U/L / ALT: 12 U/L / AST: 20 U/L / GGT: x           Urinalysis Basic - ( 2017 10:36 )    Color: Yellow / Appearance: Clear / S.010 / pH: x  Gluc: x / Ketone: Negative  / Bili: Negative / Urobili: Negative mg/dL   Blood: x / Protein: 15 mg/dL / Nitrite: Negative   Culture - Other (17 @ 12:50)    Specimen Source: .Other right groin wound    Culture Results:   Moderate Mixed gram negative rods  Few Enterococcus species  Rare Corynebacterium species    Leuk Esterase: Trace / RBC: x / WBC 0-2   Sq Epi: x / Non Sq Epi: x / Bacteria: x    Culture - Blood (17 @ 03:54)    Specimen Source: .Blood Blood    Culture Results:   No growth to date.    Culture - Other (17 @ 12:50)    Specimen Source: .Other right groin wound    Culture Results:   Moderate Mixed gram negative rods  Few Enterococcus species          Radiology:    Right foot MRI:  1.  Large skin ulcer at the posterior aspect of the heel.  2.  Fluid tracking from the skin ulcer towards the medial calcaneus   suspicious for small abscess.  3.  Osteomyelitis of the posterior calcaneus.  4.  Osteonecrosis within the posterior calcaneus.  5.  Partial tearing of the lateral Achilles insertion.  6.  Full-thickness tearing of the lateral cord of the plantar fascia with   partial tear of the central cord.    Advanced directives addressed: full resuscitation

## 2017-06-26 NOTE — PROGRESS NOTE ADULT - SUBJECTIVE AND OBJECTIVE BOX
82 year old male is seen bedside for followup of RLE necrotic wounds and partial tear of right Achilles tendon. Pt was admitted on 6/17/17 for GI bleed. Pt states he has had chronic ulcers on his right lower leg and foot for the past year and has been treated by Dr. Clement. States he had a right groin wound debrided in the OR with placement of wound VAC on 6/20. States the pain in his right lower leg and foot has decreased. States he has minimal pain in his left heel. States he is unable to walk due to the extensive wounds on his right lower leg and foot. Denies any f/c/n/v/sob. Pt states he and his family have decided to proceed with the AKA of the RLE and have informed Dr. Clement.    PMHx: CAD s/p stents (LAD, RCA bare metal around 9/16),PVD (RLE stent/ angioplasty and surgical debridment by Dr Siu 5/20 ) A.Fib not on anticoagulation due to GI bleeding, Hypertension, COPD on home O2 2L, SHAYY on nocturnal BIPAP, ex-smoker (smoked 1ppd X 50 years, quit 22 years ago),  Chronic diastolic CHF, Hyperlipidemia, PVD, Iron deficiency anemia, Chronic back pain, Gout, BPH, CKD III with baseline Cr 2.    MEDICATIONS:  MEDICATIONS  (STANDING):  allopurinol 100milliGRAM(s) Oral daily  doxazosin 8milliGRAM(s) Oral at bedtime  fenofibrate Tablet 145milliGRAM(s) Oral daily  ferrous    sulfate 325milliGRAM(s) Oral two times a day with meals  gabapentin 300milliGRAM(s) Oral daily  hydrALAZINE 50milliGRAM(s) Oral daily  HYDROmorphone   Tablet 2milliGRAM(s) Oral every 6 hours  isosorbide   mononitrate ER Tablet (IMDUR) 120milliGRAM(s) Oral daily  pantoprazole  Injectable 40milliGRAM(s) IV Push every 12 hours  pramipexole 0.125milliGRAM(s) Oral daily  senna 2Tablet(s) Oral at bedtime  simvastatin 10milliGRAM(s) Oral at bedtime  multivitamin 1Tablet(s) Oral daily  diltiazem   CD 120milliGRAM(s) Oral daily  heparin  Injectable 5000Unit(s) SubCutaneous every 8 hours  piperacillin/tazobactam IVPB. 3.375Gram(s) IV Intermittent every 8 hours  vancomycin  IVPB 500milliGRAM(s) IV Intermittent every 12 hours  ammonium lactate 12% Lotion 1Application(s) Topical daily  buDESOnide   0.5 milliGRAM(s) Respule 0.5milliGRAM(s) Inhalation two times a day  aspirin  chewable 81milliGRAM(s) Oral daily  metoprolol 25milliGRAM(s) Oral two times a day  zinc sulfate 220milliGRAM(s) Oral daily  ascorbic acid 500milliGRAM(s) Oral daily    Allergies: NKFDA    Vital Signs Last 24 Hrs  T(C): 36.9, Max: 37.2 (06-25 @ 11:53)  T(F): 98.5, Max: 99 (06-25 @ 11:53)  HR: 67 (66 - 70)  BP: 154/55 (150/43 - 163/45)  BP(mean): --  RR: 18 (18 - 18)  SpO2: 99% (98% - 100%)    I&O's Summary    I & Os for current day (as of 26 Jun 2017 08:58)  =============================================  IN: 1340 ml / OUT: 700 ml / NET: 640 ml      PHYSICAL EXAM:  Constitutional: NAD, awake and alert, well-developed  Extremities:   Vascular- Non palpable DP and PT pulses of the right foot.  Neuro- Protective sensation is decreased.  Derm- Ulcerations noted to be diffusely spread on the dorsal right foot and postero lateral right lower leg. Necrotic, gangrenous areas noted of the lateral right lower leg, right posteroplantar heel, dorsal right foot and anterior right ankle. Necrotic areas of the lateral aspect of right lower leg is deep to the level of muscle. Maceration noted around the right heel wound. Malodor present. No purulence noted. Dry gangrene noted of the dorsal aspects of the right foot toes. Eschar like ulcer noted on the poster plantar left heel. No fluctuance noted of the left foot.  Ortho- Pain upon palpation of the right lower leg and foot. Able to actively lift right lower leg with pain.      LABS: All Labs Reviewed:                        8.6    7.2   )-----------( 284      ( 26 Jun 2017 07:16 )             26.6     06-26    145  |  112<H>  |  30<H>  ----------------------------<  98  3.9   |  27  |  1.25    Ca    7.8<L>      26 Jun 2017 07:16      PTT - ( 26 Jun 2017 07:16 )  PTT:41.8 sec      Blood Culture:   Culture - Blood (06.17.17 @ 03:54)    Specimen Source: .Blood Blood    Culture Results:   No growth at 5 days.    RADIOLOGY/EKG:  EXAM:  LWR EXT (MRI) RT W O CON                            PROCEDURE DATE:  06/18/2017        INTERPRETATION:  LWR EXT (MRI) RT W O CON dated 6/18/2017 11:50 AM     INDICATION: Pain and swelling    TECHNIQUE: Multi-sequential, multiplanar MRI imaging of the ankle and   hindfoot was performed per standard protocol.     COMPARISON: None available.    FINDINGS: Evaluation of the soft tissues demonstrates a large posterior   skin ulcer measuring approximately 4.5 x 5.2 cm. Deep to this skin ulcer,   there is nonspecific soft tissue swelling and edema. There is tracking   edema from the ulcer to the proximal calcaneus extending towards the   medial aspect of the calcaneus suspicious for small abscess that measures   1.2 x 2.0 x 3.2 cm (series 10, image 13 and series 8, image 10). There is   partial tearing of the lateral aspect of the Achilles tendon which   closely approximates the skin ulcer and may be exposed. There is partial   tearing of the central plantar fascial cord and full-thickness tear of   the lateral plantar fascial cord at its origin on the calcaneus. The   remaining tendons are preserved. There is no tenosynovitis appreciated.    Evaluation of the bone marrow signal demonstrates erosive change   involving the lateral calcaneus. This erosion measures 1.1 cm. There is   abnormal decreased T1 signal with edema in the posterior aspect of the   calcaneus adjacent to the origin compatible with osteomyelitis. There is   a curvilinear focus of decreased T1 signal that measures up to 1.8 cm   (series 7, image six) within the posterior calcaneus suspicious for an   area of osteonecrosis. Patchy appearance of the bone marrow signal   throughout the foot as can be seen in the setting of decreased bone   mineralization. The tibiotalar, posterior subtalar, middle subtalar,   calcaneocuboid, and talonavicular joints are intact. There is relative   preservation of the midfoot joint spaces. No fracture is identified.    There is no soft tissue mass. The neurovascular structures are relatively   preserved.      IMPRESSION:   1.  Large skin ulcer at the posterior aspect of the heel.  2.  Fluid tracking from the skin ulcer towards the medial calcaneus   suspicious for small abscess.  3.  Osteomyelitis of the posterior calcaneus.  4.  Osteonecrosis within the posterior calcaneus.  5.  Partial tearing of the lateral Achilles insertion.  6.  Full-thickness tearing of the lateral cord of the plantar fascia with   partial tear of the central cord.

## 2017-06-26 NOTE — PROGRESS NOTE ADULT - PROBLEM SELECTOR PLAN 2
- inguinal wound culture shows mixed GNR, EN spp and corynebacterium spp  - on vancomycin 500 mg IV q12h and zosyn 3.375 gm IV q8h # 9  - S/P right groin wound debridement with wound vac in place  - Patient decided on AKA, OR time pending

## 2017-06-26 NOTE — PROGRESS NOTE ADULT - ASSESSMENT
83 y/o male with h/o CAD s/p stents (LAD, RCA bare metal around 9/16), PVD (RLE stent/ angioplasty) complicated with poorly healing right inguinal wound s/p prior surgical debridment (by Dr Siu 5/20 ) [email or web address] not on anticoagulation due to GI bleeding, Hypertension, COPD on home O2 2L, SHAYY on nocturnal BIPAP, Chronic diastolic CHF, Hyperlipidemia, PVD, Iron deficiency anemia, Chronic back pain, Gout, BPH, CKD III with baseline Cr 2, recent 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 was admitted on 6/16 for worsening anemia with Hb 6, worsening leg pain from ulcers and worsening renal function. He has gradual onset  progressive fatigue over the last couple of weeks. Patient is unable to ambulate because of his leg ulcers. In ER, he was started on vancomycin IV and zosyn.    1. Right inguinal open wound with probable infection with mixed GNR, corynebacterium spp and EN spp. Right heel ulcer with underlying chronic OM.  -inguinal wound culture shows mixed GNR, EN spp and corynebacterium spp  -on vancomycin 500 mg IV q12h and zosyn 3.375 gm IV q8h # 9  -tolerating abx well so far; no side effects noted  -vancomycin trough level is therapeutic  -podiatry evaluation appreciated  -BKA is considered  -local wound care per surgery  -continue IV abx coverage for now  -monitor temps  -f/u CBC  -supportive care  2. Other issues: CAD s/p stents, PVD, Brianna not on anticoagulation due to GI bleeding, Hypertension, COPD, SHAYY on nocturnal BIPAP, Chronic diastolic CHF, Hyperlipidemia, PVD, Iron deficiency anemia, Chronic back pain, Gout, BPH, CKD III   -care per medicine

## 2017-06-27 ENCOUNTER — RESULT REVIEW (OUTPATIENT)
Age: 82
End: 2017-06-27

## 2017-06-27 LAB
ANION GAP SERPL CALC-SCNC: 6 MMOL/L — SIGNIFICANT CHANGE UP (ref 5–17)
ANION GAP SERPL CALC-SCNC: 7 MMOL/L — SIGNIFICANT CHANGE UP (ref 5–17)
APTT BLD: 35.1 SEC — SIGNIFICANT CHANGE UP (ref 27.5–37.4)
BUN SERPL-MCNC: 32 MG/DL — HIGH (ref 7–23)
BUN SERPL-MCNC: 32 MG/DL — HIGH (ref 7–23)
CALCIUM SERPL-MCNC: 7.6 MG/DL — LOW (ref 8.5–10.1)
CALCIUM SERPL-MCNC: 7.9 MG/DL — LOW (ref 8.5–10.1)
CHLORIDE SERPL-SCNC: 113 MMOL/L — HIGH (ref 96–108)
CHLORIDE SERPL-SCNC: 115 MMOL/L — HIGH (ref 96–108)
CO2 SERPL-SCNC: 26 MMOL/L — SIGNIFICANT CHANGE UP (ref 22–31)
CO2 SERPL-SCNC: 28 MMOL/L — SIGNIFICANT CHANGE UP (ref 22–31)
CREAT SERPL-MCNC: 1.38 MG/DL — HIGH (ref 0.5–1.3)
CREAT SERPL-MCNC: 1.44 MG/DL — HIGH (ref 0.5–1.3)
GLUCOSE SERPL-MCNC: 106 MG/DL — HIGH (ref 70–99)
GLUCOSE SERPL-MCNC: 90 MG/DL — SIGNIFICANT CHANGE UP (ref 70–99)
HCT VFR BLD CALC: 25.8 % — LOW (ref 39–50)
HCT VFR BLD CALC: 27.2 % — LOW (ref 39–50)
HGB BLD-MCNC: 8.4 G/DL — LOW (ref 13–17)
HGB BLD-MCNC: 8.5 G/DL — LOW (ref 13–17)
MCHC RBC-ENTMCNC: 28.5 PG — SIGNIFICANT CHANGE UP (ref 27–34)
MCHC RBC-ENTMCNC: 29 PG — SIGNIFICANT CHANGE UP (ref 27–34)
MCHC RBC-ENTMCNC: 31.4 GM/DL — LOW (ref 32–36)
MCHC RBC-ENTMCNC: 32.4 GM/DL — SIGNIFICANT CHANGE UP (ref 32–36)
MCV RBC AUTO: 89.6 FL — SIGNIFICANT CHANGE UP (ref 80–100)
MCV RBC AUTO: 90.5 FL — SIGNIFICANT CHANGE UP (ref 80–100)
PLATELET # BLD AUTO: 254 K/UL — SIGNIFICANT CHANGE UP (ref 150–400)
PLATELET # BLD AUTO: 277 K/UL — SIGNIFICANT CHANGE UP (ref 150–400)
POTASSIUM SERPL-MCNC: 3.7 MMOL/L — SIGNIFICANT CHANGE UP (ref 3.5–5.3)
POTASSIUM SERPL-MCNC: 3.8 MMOL/L — SIGNIFICANT CHANGE UP (ref 3.5–5.3)
POTASSIUM SERPL-SCNC: 3.7 MMOL/L — SIGNIFICANT CHANGE UP (ref 3.5–5.3)
POTASSIUM SERPL-SCNC: 3.8 MMOL/L — SIGNIFICANT CHANGE UP (ref 3.5–5.3)
RBC # BLD: 2.88 M/UL — LOW (ref 4.2–5.8)
RBC # BLD: 3 M/UL — LOW (ref 4.2–5.8)
RBC # FLD: 15.7 % — HIGH (ref 10.3–14.5)
RBC # FLD: 16.8 % — HIGH (ref 10.3–14.5)
SODIUM SERPL-SCNC: 147 MMOL/L — HIGH (ref 135–145)
SODIUM SERPL-SCNC: 148 MMOL/L — HIGH (ref 135–145)
WBC # BLD: 7.1 K/UL — SIGNIFICANT CHANGE UP (ref 3.8–10.5)
WBC # BLD: 7.5 K/UL — SIGNIFICANT CHANGE UP (ref 3.8–10.5)
WBC # FLD AUTO: 7.1 K/UL — SIGNIFICANT CHANGE UP (ref 3.8–10.5)
WBC # FLD AUTO: 7.5 K/UL — SIGNIFICANT CHANGE UP (ref 3.8–10.5)

## 2017-06-27 PROCEDURE — 88307 TISSUE EXAM BY PATHOLOGIST: CPT | Mod: 26

## 2017-06-27 PROCEDURE — 88311 DECALCIFY TISSUE: CPT | Mod: 26

## 2017-06-27 RX ORDER — GABAPENTIN 400 MG/1
300 CAPSULE ORAL DAILY
Qty: 0 | Refills: 0 | Status: DISCONTINUED | OUTPATIENT
Start: 2017-06-27 | End: 2017-07-08

## 2017-06-27 RX ORDER — HYDROMORPHONE HYDROCHLORIDE 2 MG/ML
1 INJECTION INTRAMUSCULAR; INTRAVENOUS; SUBCUTANEOUS
Qty: 0 | Refills: 0 | Status: DISCONTINUED | OUTPATIENT
Start: 2017-06-27 | End: 2017-07-03

## 2017-06-27 RX ORDER — ALLOPURINOL 300 MG
100 TABLET ORAL DAILY
Qty: 0 | Refills: 0 | Status: DISCONTINUED | OUTPATIENT
Start: 2017-06-27 | End: 2017-07-08

## 2017-06-27 RX ORDER — ACETAMINOPHEN 500 MG
500 TABLET ORAL EVERY 6 HOURS
Qty: 0 | Refills: 0 | Status: DISCONTINUED | OUTPATIENT
Start: 2017-06-27 | End: 2017-07-08

## 2017-06-27 RX ORDER — ISOSORBIDE MONONITRATE 60 MG/1
120 TABLET, EXTENDED RELEASE ORAL DAILY
Qty: 0 | Refills: 0 | Status: DISCONTINUED | OUTPATIENT
Start: 2017-06-27 | End: 2017-07-08

## 2017-06-27 RX ORDER — ASPIRIN/CALCIUM CARB/MAGNESIUM 324 MG
81 TABLET ORAL DAILY
Qty: 0 | Refills: 0 | Status: DISCONTINUED | OUTPATIENT
Start: 2017-06-27 | End: 2017-07-08

## 2017-06-27 RX ORDER — SENNA PLUS 8.6 MG/1
2 TABLET ORAL AT BEDTIME
Qty: 0 | Refills: 0 | Status: DISCONTINUED | OUTPATIENT
Start: 2017-06-27 | End: 2017-07-01

## 2017-06-27 RX ORDER — FERROUS SULFATE 325(65) MG
325 TABLET ORAL
Qty: 0 | Refills: 0 | Status: DISCONTINUED | OUTPATIENT
Start: 2017-06-27 | End: 2017-07-08

## 2017-06-27 RX ORDER — HEPARIN SODIUM 5000 [USP'U]/ML
5000 INJECTION INTRAVENOUS; SUBCUTANEOUS EVERY 8 HOURS
Qty: 0 | Refills: 0 | Status: DISCONTINUED | OUTPATIENT
Start: 2017-06-27 | End: 2017-07-08

## 2017-06-27 RX ORDER — FENTANYL CITRATE 50 UG/ML
50 INJECTION INTRAVENOUS
Qty: 0 | Refills: 0 | Status: DISCONTINUED | OUTPATIENT
Start: 2017-06-27 | End: 2017-06-27

## 2017-06-27 RX ORDER — FENOFIBRATE,MICRONIZED 130 MG
145 CAPSULE ORAL DAILY
Qty: 0 | Refills: 0 | Status: DISCONTINUED | OUTPATIENT
Start: 2017-06-27 | End: 2017-07-08

## 2017-06-27 RX ORDER — HYDROMORPHONE HYDROCHLORIDE 2 MG/ML
0.5 INJECTION INTRAMUSCULAR; INTRAVENOUS; SUBCUTANEOUS EVERY 6 HOURS
Qty: 0 | Refills: 0 | Status: DISCONTINUED | OUTPATIENT
Start: 2017-06-27 | End: 2017-07-03

## 2017-06-27 RX ORDER — HYDROMORPHONE HYDROCHLORIDE 2 MG/ML
2 INJECTION INTRAMUSCULAR; INTRAVENOUS; SUBCUTANEOUS EVERY 6 HOURS
Qty: 0 | Refills: 0 | Status: DISCONTINUED | OUTPATIENT
Start: 2017-06-27 | End: 2017-07-03

## 2017-06-27 RX ORDER — ONDANSETRON 8 MG/1
4 TABLET, FILM COATED ORAL ONCE
Qty: 0 | Refills: 0 | Status: DISCONTINUED | OUTPATIENT
Start: 2017-06-27 | End: 2017-06-27

## 2017-06-27 RX ORDER — DOXAZOSIN MESYLATE 4 MG
8 TABLET ORAL AT BEDTIME
Qty: 0 | Refills: 0 | Status: DISCONTINUED | OUTPATIENT
Start: 2017-06-27 | End: 2017-07-08

## 2017-06-27 RX ORDER — PRAMIPEXOLE DIHYDROCHLORIDE 0.12 MG/1
0.12 TABLET ORAL DAILY
Qty: 0 | Refills: 0 | Status: DISCONTINUED | OUTPATIENT
Start: 2017-06-27 | End: 2017-07-08

## 2017-06-27 RX ORDER — ALBUTEROL 90 UG/1
2 AEROSOL, METERED ORAL EVERY 6 HOURS
Qty: 0 | Refills: 0 | Status: DISCONTINUED | OUTPATIENT
Start: 2017-06-27 | End: 2017-07-08

## 2017-06-27 RX ORDER — BUDESONIDE, MICRONIZED 100 %
0.5 POWDER (GRAM) MISCELLANEOUS
Qty: 0 | Refills: 0 | Status: DISCONTINUED | OUTPATIENT
Start: 2017-06-27 | End: 2017-07-08

## 2017-06-27 RX ORDER — PIPERACILLIN AND TAZOBACTAM 4; .5 G/20ML; G/20ML
3.38 INJECTION, POWDER, LYOPHILIZED, FOR SOLUTION INTRAVENOUS EVERY 8 HOURS
Qty: 0 | Refills: 0 | Status: DISCONTINUED | OUTPATIENT
Start: 2017-06-27 | End: 2017-07-08

## 2017-06-27 RX ORDER — METOPROLOL TARTRATE 50 MG
25 TABLET ORAL
Qty: 0 | Refills: 0 | Status: DISCONTINUED | OUTPATIENT
Start: 2017-06-27 | End: 2017-07-08

## 2017-06-27 RX ORDER — SODIUM CHLORIDE 9 MG/ML
1000 INJECTION INTRAMUSCULAR; INTRAVENOUS; SUBCUTANEOUS
Qty: 0 | Refills: 0 | Status: DISCONTINUED | OUTPATIENT
Start: 2017-06-27 | End: 2017-06-28

## 2017-06-27 RX ORDER — SIMVASTATIN 20 MG/1
10 TABLET, FILM COATED ORAL AT BEDTIME
Qty: 0 | Refills: 0 | Status: DISCONTINUED | OUTPATIENT
Start: 2017-06-27 | End: 2017-07-08

## 2017-06-27 RX ORDER — MEPERIDINE HYDROCHLORIDE 50 MG/ML
12.5 INJECTION INTRAMUSCULAR; INTRAVENOUS; SUBCUTANEOUS
Qty: 0 | Refills: 0 | Status: DISCONTINUED | OUTPATIENT
Start: 2017-06-27 | End: 2017-06-27

## 2017-06-27 RX ORDER — OXYCODONE HYDROCHLORIDE 5 MG/1
5 TABLET ORAL EVERY 4 HOURS
Qty: 0 | Refills: 0 | Status: DISCONTINUED | OUTPATIENT
Start: 2017-06-27 | End: 2017-06-27

## 2017-06-27 RX ORDER — SODIUM CHLORIDE 9 MG/ML
1000 INJECTION INTRAMUSCULAR; INTRAVENOUS; SUBCUTANEOUS
Qty: 0 | Refills: 0 | Status: DISCONTINUED | OUTPATIENT
Start: 2017-06-27 | End: 2017-06-27

## 2017-06-27 RX ORDER — ACETAMINOPHEN 500 MG
1000 TABLET ORAL ONCE
Qty: 0 | Refills: 0 | Status: COMPLETED | OUTPATIENT
Start: 2017-06-27 | End: 2017-06-27

## 2017-06-27 RX ORDER — HYDRALAZINE HCL 50 MG
50 TABLET ORAL DAILY
Qty: 0 | Refills: 0 | Status: DISCONTINUED | OUTPATIENT
Start: 2017-06-27 | End: 2017-07-08

## 2017-06-27 RX ORDER — SOD,AMMONIUM,POTASSIUM LACTATE
1 CREAM (GRAM) TOPICAL DAILY
Qty: 0 | Refills: 0 | Status: DISCONTINUED | OUTPATIENT
Start: 2017-06-27 | End: 2017-07-08

## 2017-06-27 RX ADMIN — FENTANYL CITRATE 50 MICROGRAM(S): 50 INJECTION INTRAVENOUS at 19:33

## 2017-06-27 RX ADMIN — HYDROMORPHONE HYDROCHLORIDE 1 MILLIGRAM(S): 2 INJECTION INTRAMUSCULAR; INTRAVENOUS; SUBCUTANEOUS at 23:26

## 2017-06-27 RX ADMIN — HYDROMORPHONE HYDROCHLORIDE 2 MILLIGRAM(S): 2 INJECTION INTRAMUSCULAR; INTRAVENOUS; SUBCUTANEOUS at 23:42

## 2017-06-27 RX ADMIN — Medication 50 MILLIGRAM(S): at 05:24

## 2017-06-27 RX ADMIN — HYDROMORPHONE HYDROCHLORIDE 2 MILLIGRAM(S): 2 INJECTION INTRAMUSCULAR; INTRAVENOUS; SUBCUTANEOUS at 01:15

## 2017-06-27 RX ADMIN — HYDROMORPHONE HYDROCHLORIDE 2 MILLIGRAM(S): 2 INJECTION INTRAMUSCULAR; INTRAVENOUS; SUBCUTANEOUS at 05:22

## 2017-06-27 RX ADMIN — Medication 25 MILLIGRAM(S): at 05:24

## 2017-06-27 RX ADMIN — FENTANYL CITRATE 50 MICROGRAM(S): 50 INJECTION INTRAVENOUS at 19:14

## 2017-06-27 RX ADMIN — Medication 100 MILLIGRAM(S): at 17:03

## 2017-06-27 RX ADMIN — Medication 1000 MILLIGRAM(S): at 23:41

## 2017-06-27 RX ADMIN — ISOSORBIDE MONONITRATE 120 MILLIGRAM(S): 60 TABLET, EXTENDED RELEASE ORAL at 12:39

## 2017-06-27 RX ADMIN — Medication 0.5 MILLIGRAM(S): at 08:02

## 2017-06-27 RX ADMIN — HYDROMORPHONE HYDROCHLORIDE 2 MILLIGRAM(S): 2 INJECTION INTRAMUSCULAR; INTRAVENOUS; SUBCUTANEOUS at 06:17

## 2017-06-27 RX ADMIN — SODIUM CHLORIDE 75 MILLILITER(S): 9 INJECTION, SOLUTION INTRAVENOUS at 06:16

## 2017-06-27 RX ADMIN — HYDROMORPHONE HYDROCHLORIDE 2 MILLIGRAM(S): 2 INJECTION INTRAMUSCULAR; INTRAVENOUS; SUBCUTANEOUS at 11:10

## 2017-06-27 RX ADMIN — PIPERACILLIN AND TAZOBACTAM 25 GRAM(S): 4; .5 INJECTION, POWDER, LYOPHILIZED, FOR SOLUTION INTRAVENOUS at 06:16

## 2017-06-27 RX ADMIN — SODIUM CHLORIDE 75 MILLILITER(S): 9 INJECTION INTRAMUSCULAR; INTRAVENOUS; SUBCUTANEOUS at 19:33

## 2017-06-27 RX ADMIN — Medication 1 APPLICATION(S): at 12:38

## 2017-06-27 RX ADMIN — Medication 120 MILLIGRAM(S): at 05:24

## 2017-06-27 RX ADMIN — FENTANYL CITRATE 50 MICROGRAM(S): 50 INJECTION INTRAVENOUS at 21:13

## 2017-06-27 RX ADMIN — FENTANYL CITRATE 50 MICROGRAM(S): 50 INJECTION INTRAVENOUS at 21:10

## 2017-06-27 RX ADMIN — Medication 400 MILLIGRAM(S): at 19:13

## 2017-06-27 RX ADMIN — PIPERACILLIN AND TAZOBACTAM 25 GRAM(S): 4; .5 INJECTION, POWDER, LYOPHILIZED, FOR SOLUTION INTRAVENOUS at 14:03

## 2017-06-27 RX ADMIN — HYDROMORPHONE HYDROCHLORIDE 0.5 MILLIGRAM(S): 2 INJECTION INTRAMUSCULAR; INTRAVENOUS; SUBCUTANEOUS at 06:32

## 2017-06-27 RX ADMIN — Medication 100 MILLIGRAM(S): at 05:18

## 2017-06-27 RX ADMIN — FENTANYL CITRATE 50 MICROGRAM(S): 50 INJECTION INTRAVENOUS at 20:26

## 2017-06-27 RX ADMIN — HEPARIN SODIUM 5000 UNIT(S): 5000 INJECTION INTRAVENOUS; SUBCUTANEOUS at 05:19

## 2017-06-27 RX ADMIN — PANTOPRAZOLE SODIUM 40 MILLIGRAM(S): 20 TABLET, DELAYED RELEASE ORAL at 05:21

## 2017-06-27 NOTE — PROGRESS NOTE ADULT - SUBJECTIVE AND OBJECTIVE BOX
Chief Complaint/Reason for Admission: Sent from [facility] rehab because of low hemoglobin 7.7	  History of Present Illness: 	  82 year old male with history of CAD s/p stents (LAD, RCA bare metal around 9/16),PVD (RLE stent/ angioplasty and surgical debridment by Dr Siu 5/20 ) Brianna not on anticoagulation due to GI bleeding, Hypertension, COPD on home O2 2L, SHAYY on nocturnal BIPAP, ex-smoker (smoked 1ppd X 50 years, quit 22 years ago),  Chronic diastolic CHF, Hyperlipidemia, PVD, Iron deficiency anemia, Chronic back pain, Gout, BPH, CKD III with baseline Cr 2. Was ecently admitted to  on  4/17 with anemia of GI bleeding, RLE and RUE DVTs,( received pRBCs and IVC filter discharged to rehab with aspirin) was readmitted to   ER on 5/4/17 for worsening anemia with Hb 6, worsening leg pain from ulcers and worsening renal function Cr 3.99 now presents from [facility] with anemia (7.7 on labs today decreasing from 9.5 over past few weeks). Pt c/o gradual onset  progressive fatigue over the last couple of weeks. Patient is unable to ambulate because of his leg cellulitis and ulcers. He denies any shortness of breath, palpitations / chest pain / light headedness. According to the daughter the attendants at [facility] did notice dark stools for the last couple of days. Patient denies any abdominal pain or change in bowel or urinary habits.  In ED, + guaiac.     6/19 - Patient seen and examined. Reports 6 loose BM since this morning.  Patient has been placed on isolation until Cdiff has been rulled out.  Afebrile. hemodynamically stable.     6/20 - Patient going for muscle flap of groin area tonight after 1u PRBCs for anem.  Hemodynamically stable. Patient had low grade temp overnight.  On Vanco/Zosyn #3.  Tolerating abx well.  No Diarrhea, no rash.     6/21 - Patient seen and examined with daughter at bedside eating breakfast.  S/P right femoral wound debridement Day #1.  Wound vac in place and functional.  patient is feeling well with no complaints at this time. Has elevated BP, Will increased dose of Metoprolol to BID.  Podiatry consult placed to evaluate right achilles tendon rupture repair. Hemodynamically stable, afebrile. On Vanco/Zosyn day #4.    6/22 - Patient seen and examined.  Hemodynamically stable. NAD, Afebrile. On Vanco.Zosyn Day #5.  patient is feeling well.  Has been seen and Dr. Cardenas and may require further debridement and or AKA.  Patient is in pain despite being on Dilaudid standing q6hr. Will add Dilaudid for breakthrough pain.     6/23 - Patient seen and examined with daughter at bedside.  Had a long conversation with patient and daughter about long term goals of care.  Wound debridement vs AKA, Prosthetics, Physical therapy.  Patient is very deconditioned from chronic right LE wounds.  Patient would most likely benifit from AKA in the long term.  Hemodynamically stable.  afebrile. No overnight events.  Tolerating abx well.     6/24 - Patient seen and examined with daughter at bedside.  Discussed AKA with patient and daughter.  Patient is leaning towards having the amputation done but will decide in the next few days.  Hemodynamically stable, pain controlled, afebrile.  No overnight events.     6/26 - Patient seen and examined with daughter at bedside.  Patient had discussed AKA with Dr. Clement and has decided on having procedure done.  Time of surgery not yet determined. Hemodynamically stable, afebrile.  Patient is feeling well, tolerating PO and seen by PT.      6/27 - Patient seen and examined with family at bedside.  Family has arrived for moral support since patient is to undergo AKA today.  He is in good spirits and is in agreement with plan.  Family is very supportive.  Patient is hemodynamically stable, afebrile.  No overnight events.      MEDICATIONS  (STANDING):  sodium chloride 0.9%. 1000 milliLiter(s) (75 mL/Hr) IV Continuous <Continuous>  sodium chloride 0.9%. 1000 milliLiter(s) (75 mL/Hr) IV Continuous <Continuous>  allopurinol 100 milliGRAM(s) Oral daily  doxazosin 8 milliGRAM(s) Oral at bedtime  fenofibrate Tablet 145 milliGRAM(s) Oral daily  ferrous    sulfate 325 milliGRAM(s) Oral two times a day with meals  gabapentin 300 milliGRAM(s) Oral daily  hydrALAZINE 50 milliGRAM(s) Oral daily  HYDROmorphone   Tablet 2 milliGRAM(s) Oral every 6 hours  isosorbide   mononitrate ER Tablet (IMDUR) 120 milliGRAM(s) Oral daily  pramipexole 0.125 milliGRAM(s) Oral daily  senna 2 Tablet(s) Oral at bedtime  simvastatin 10 milliGRAM(s) Oral at bedtime  piperacillin/tazobactam IVPB. 3.375 Gram(s) IV Intermittent every 8 hours  ammonium lactate 12% Lotion 1 Application(s) Topical daily  buDESOnide   0.5 milliGRAM(s) Respule 0.5 milliGRAM(s) Inhalation two times a day  metoprolol 25 milliGRAM(s) Oral two times a day    MEDICATIONS  (PRN):  oxyCODONE IR 5 milliGRAM(s) Oral every 4 hours PRN For moderate pain  ondansetron Injectable 4 milliGRAM(s) IV Push once PRN Nausea and/or Vomiting  meperidine     Injectable 12.5 milliGRAM(s) IV Push every 10 minutes PRN Shivering  HYDROmorphone  Injectable 1 milliGRAM(s) IV Push every 3 hours PRN Moderate Pain (4 - 6)  acetaminophen   Tablet. 500 milliGRAM(s) Oral every 6 hours PRN Mild Pain (1 - 3)  ALBUTerol    90 MICROgram(s) HFA Inhaler 2 Puff(s) Inhalation every 6 hours PRN Shortness of Breath  HYDROmorphone  Injectable 0.5 milliGRAM(s) IV Push every 6 hours PRN Severe Pain (7 - 10)    ICU Vital Signs Last 24 Hrs  T(C): 36.9 (27 Jun 2017 22:00), Max: 37.4 (27 Jun 2017 18:55)  T(F): 98.4 (27 Jun 2017 22:00), Max: 99.4 (27 Jun 2017 18:55)  HR: 72 (27 Jun 2017 22:00) (60 - 88)  BP: 153/63 (27 Jun 2017 22:00) (120/45 - 172/55)  BP(mean): --  ABP: --  ABP(mean): --  RR: 16 (27 Jun 2017 22:00) (15 - 20)  SpO2: 98% (27 Jun 2017 22:00) (91% - 100%)    GEN: NAD, comfortable, resting in bed  CV: S1S2, RRR, no mumur  RESP: good air movement, CTABL, no rales, rhonchi or wheezing  ABD: +BS, soft, ND, NT, no guarding, no rigidity  Skin: R groin wound vac in place and functional; distal leg wounds granulating with scattered areas of eschar, tendon on R lateral leg exposed and necrotic; foot intact; L leg with stasis changes but no cellulitis or ulcers  EXT: +2 radial and pedial pulses, no edema, no calve tenderness                                                          8.4    7.1   )-----------( 254      ( 27 Jun 2017 19:48 )             25.8     27 Jun 2017 19:48    147    |  115    |  32     ----------------------------<  90     3.8     |  26     |  1.44     Ca    7.6        27 Jun 2017 19:48        PTT - ( 27 Jun 2017 07:05 )  PTT:35.1 sec  CAPILLARY BLOOD GLUCOSE      EXAM:  LWR EXT (MRI) RT W O CON                        PROCEDURE DATE:  06/18/2017    IMPRESSION:   1.  Large skin ulcer at the posterior aspect of the heel.  2.  Fluid tracking from the skin ulcer towards the medial calcaneus   suspicious for small abscess.  3.  Osteomyelitis of the posterior calcaneus.  4.  Osteonecrosis within the posterior calcaneus.  5.  Partial tearing of the lateral Achilles insertion.  6.  Full-thickness tearing of the lateral cord of the plantar fascia with   partial tear of the central cord.

## 2017-06-27 NOTE — PROGRESS NOTE ADULT - PROBLEM SELECTOR PLAN 2
- inguinal wound culture shows mixed GNR, EN spp and corynebacterium spp  - on vancomycin 500 mg IV q12h and zosyn 3.375 gm IV q8h # 10  - S/P right groin wound debridement with wound vac in place  - AKA tonight

## 2017-06-27 NOTE — BRIEF OPERATIVE NOTE - POST-OP DX
Osteomyelitis of ankle or foot, acute, right  06/27/2017  calcaneal osteomyelitis  Active  Jason Clement  Ulcer of groin incision with fat layer exposed  06/20/2017    Active  Jason Clement

## 2017-06-27 NOTE — PROGRESS NOTE ADULT - SUBJECTIVE AND OBJECTIVE BOX
82 year old male is seen bedside for followup of RLE necrotic wounds and partial tear of right Achilles tendon and preulcerative lesion of the left heel. Pt was admitted on 6/17/17 for GI bleed. Pt states he has had chronic ulcers on his right lower leg and foot for the past year and has been treated by Dr. Clement. States he had a right groin wound debrided in the OR with placement of wound VAC on 6/20. Pt's daughters are bedside. States he will be going for and AKA of his right leg with Dr. Clement today. States he has minimal pain in his left heel. States he is unable to walk due to the extensive wounds on his right lower leg and foot. Denies any f/c/n/v/sob.     PMHx: CAD s/p stents (LAD, RCA bare metal around 9/16),PVD (RLE stent/ angioplasty and surgical debridment by Dr Siu 5/20 ) A.Fib not on anticoagulation due to GI bleeding, Hypertension, COPD on home O2 2L, SHAYY on nocturnal BIPAP, ex-smoker (smoked 1ppd X 50 years, quit 22 years ago),  Chronic diastolic CHF, Hyperlipidemia, PVD, Iron deficiency anemia, Chronic back pain, Gout, BPH, CKD III with baseline Cr 2.    MEDICATIONS:  MEDICATIONS  (STANDING):  allopurinol 100 milliGRAM(s) Oral daily  doxazosin 8 milliGRAM(s) Oral at bedtime  fenofibrate Tablet 145 milliGRAM(s) Oral daily  ferrous    sulfate 325 milliGRAM(s) Oral two times a day with meals  gabapentin 300 milliGRAM(s) Oral daily  hydrALAZINE 50 milliGRAM(s) Oral daily  HYDROmorphone   Tablet 2 milliGRAM(s) Oral every 6 hours  isosorbide   mononitrate ER Tablet (IMDUR) 120 milliGRAM(s) Oral daily  pantoprazole  Injectable 40 milliGRAM(s) IV Push every 12 hours  pramipexole 0.125 milliGRAM(s) Oral daily  senna 2 Tablet(s) Oral at bedtime  simvastatin 10 milliGRAM(s) Oral at bedtime  multivitamin 1 Tablet(s) Oral daily  diltiazem    milliGRAM(s) Oral daily  heparin  Injectable 5000 Unit(s) SubCutaneous every 8 hours  piperacillin/tazobactam IVPB. 3.375 Gram(s) IV Intermittent every 8 hours  vancomycin  IVPB 500 milliGRAM(s) IV Intermittent every 12 hours  ammonium lactate 12% Lotion 1 Application(s) Topical daily  buDESOnide   0.5 milliGRAM(s) Respule 0.5 milliGRAM(s) Inhalation two times a day  aspirin  chewable 81 milliGRAM(s) Oral daily  metoprolol 25 milliGRAM(s) Oral two times a day  zinc sulfate 220 milliGRAM(s) Oral daily  ascorbic acid 500 milliGRAM(s) Oral daily  sodium chloride 0.45%. 1000 milliLiter(s) (75 mL/Hr) IV Continuous <Continuous>    Allergies: NKFDA    Vital Signs Last 24 Hrs  T(C): 36.7 (27 Jun 2017 04:35), Max: 37.3 (26 Jun 2017 17:20)  T(F): 98 (27 Jun 2017 04:35), Max: 99.1 (26 Jun 2017 17:20)  HR: 76 (27 Jun 2017 08:02) (61 - 83)  BP: 172/55 (27 Jun 2017 04:35) (153/46 - 172/55)  BP(mean): --  RR: 18 (27 Jun 2017 04:35) (16 - 18)  SpO2: 96% (27 Jun 2017 04:35) (96% - 99%)    I&O's Summary    26 Jun 2017 07:01  -  27 Jun 2017 07:00  --------------------------------------------------------  IN: 660 mL / OUT: 1 mL / NET: 659 mL    27 Jun 2017 07:01  -  27 Jun 2017 10:17  --------------------------------------------------------  IN: 0 mL / OUT: 10 mL / NET: -10 mL      PHYSICAL EXAM:  Constitutional: NAD, awake and alert, well-developed  Extremities:   Vascular- Non palpable DP and PT pulses of the right foot.  Neuro- Protective sensation is decreased.  Derm- Dressing noted to be clean, dry and intact on the RLE. Preulcerative lesion noted on the postero plantar left heel. No fluctuance noted of the left foot. No active drainage noted. Left hallux toenail are thickened and dystrophic with subdermal blood at the distal aspect of the hallux. No acute signs of infection noted.  Ortho- Pain upon palpation of the right lower leg and foot.     LABS: All Labs Reviewed:                        8.5    7.5   )-----------( 277      ( 27 Jun 2017 07:05 )             27.2     06-27    148<H>  |  113<H>  |  32<H>  ----------------------------<  106<H>  3.7   |  28  |  1.38<H>    Ca    7.9<L>      27 Jun 2017 07:05      PTT - ( 27 Jun 2017 07:05 )  PTT:35.1 sec      Culture - Blood (06.17.17 @ 03:54)    Specimen Source: .Blood Blood    Culture Results:   No growth at 5 days.      RADIOLOGY/EKG:    < from: MRI Lower Ext Non-joint w/Cont, Right (06.18.17 @ 11:50) >  EXAM:  LWR EXT (MRI) RT W O CON                            PROCEDURE DATE:  06/18/2017        INTERPRETATION:  LWR EXT (MRI) RT W O CON dated 6/18/2017 11:50 AM     INDICATION: Pain and swelling    TECHNIQUE: Multi-sequential, multiplanar MRI imaging of the ankle and   hindfoot was performed per standard protocol.     COMPARISON: None available.    FINDINGS: Evaluation of the soft tissues demonstrates a large posterior   skin ulcer measuring approximately 4.5 x 5.2 cm. Deep to this skin ulcer,   there is nonspecific soft tissue swelling and edema. There is tracking   edema from the ulcer to the proximal calcaneus extending towards the   medial aspect of the calcaneus suspicious for small abscess that measures   1.2 x 2.0 x 3.2 cm (series 10, image 13 and series 8, image 10). There is   partial tearing of the lateral aspect of the Achilles tendon which   closely approximates the skin ulcer and may be exposed. There is partial   tearing of the central plantar fascial cord and full-thickness tear of   the lateral plantar fascial cord at its origin on the calcaneus. The   remaining tendons are preserved. There is no tenosynovitis appreciated.    Evaluation of the bone marrow signal demonstrates erosive change   involving the lateral calcaneus. This erosion measures 1.1 cm. There is   abnormal decreased T1 signal with edema in the posterior aspect of the   calcaneus adjacent to the origin compatible with osteomyelitis. There is   a curvilinear focus of decreased T1 signal that measures up to 1.8 cm   (series 7, image six) within the posterior calcaneus suspicious for an   area of osteonecrosis. Patchy appearance of the bone marrow signal   throughout the foot as can be seen in the setting of decreased bone   mineralization. The tibiotalar, posterior subtalar, middle subtalar,   calcaneocuboid, and talonavicular joints are intact. There is relative   preservation of the midfoot joint spaces. No fracture is identified.    There is no soft tissue mass. The neurovascular structures are relatively   preserved.      IMPRESSION:   1.  Large skin ulcer at the posterior aspect of the heel.  2.  Fluid tracking from the skin ulcer towards the medial calcaneus   suspicious for small abscess.  3.  Osteomyelitis of the posterior calcaneus.  4.  Osteonecrosis within the posterior calcaneus.  5.  Partial tearing of the lateral Achilles insertion.  6.  Full-thickness tearing of the lateral cord of the plantar fascia with   partial tear of the central cord.    < end of copied text >

## 2017-06-27 NOTE — PROGRESS NOTE ADULT - ASSESSMENT
Anemia–likely due to anemia of chronic disease and contributing factor possibly from GI source as well.  Would follow serial H&H's.    His hematocrit appears to be stable in the hospital. I had discussion with the patient and his daughter. For now, we will hold off on endoscopy as he is at high risk. However, if there is plan for vascular intervention and increasing  antiplatelet agents, we may want cross the bridge of endoscopic evaluation again.  restarted iron and vida      CAD: ASA restarted at low dose  DW pt again and daughter  plans amputation and DW Dr Henry  DW pt  risk MI vs risk bleed        Would clinically follow and ascertain for evidence of significant bleeding.     Would continue proton pump inhibitor twice a day for now.

## 2017-06-27 NOTE — PROGRESS NOTE ADULT - SUBJECTIVE AND OBJECTIVE BOX
Pt has been seen and examined with FP resident, resident supervised agree with a/p       Patient is a 82y old  Male who presents with a chief complaint of Sent from [facility] rehab because of low hemoglobin 7.7 (17 Jun 2017 00:37)        HPI:  82 year old male with history of CAD s/p stents (LAD, RCA bare metal around 9/16),PVD (RLE stent/ angioplasty and surgical debridment by Dr Siu 5/20 ) A.Fib not on anticoagulation due to GI bleeding, Hypertension, COPD on home O2 2L, SHAYY on nocturnal BIPAP, ex-smoker (smoked 1ppd X 50 years, quit 22 years ago),  Chronic diastolic CHF, Hyperlipidemia, PVD, Iron deficiency anemia, Chronic back pain, Gout, BPH, CKD III with baseline Cr 2. Was ecently admitted to  on  4/17 with anemia of GI bleeding, RLE and RUE DVTs,( received pRBCs and IVC filter discharged to rehab with aspirin) was readmitted to   ER on 5/4/17 for worsening anemia with Hb 6, worsening leg pain from ulcers and worsening renal function Cr 3.99 now presents from [facility] with anemia (7.7 on labs today decreasing from 9.5 over past few weeks). Pt c/o gradual onset  progressive fatigue over the last couple of weeks. Patient is unable to ambulate because of his leg cellulitis and ulcers. He denies any shortness of breath, palpitations / chest pain / light headedness. According to the daughter the attendants at [facility] did notice dark stools for the last couple of days. Patient denies any abdominal pain or change in bowel or urinary habits.  In ED, + guaiac. (17 Jun 2017 00:37)        PHYSICAL EXAM:  Vital Signs Last 24 Hrs  T(C): 36.4 (27 Jun 2017 12:20), Max: 37.3 (26 Jun 2017 17:20)  T(F): 97.5 (27 Jun 2017 12:20), Max: 99.1 (26 Jun 2017 17:20)  HR: 74 (27 Jun 2017 12:20) (61 - 83)  BP: 161/48 (27 Jun 2017 12:20) (153/46 - 172/55)  BP(mean): --  RR: 18 (27 Jun 2017 12:20) (18 - 18)  SpO2: 97% (27 Jun 2017 12:20) (96% - 99%)  general- comfortable   -rs-aeeb,cta  -cvs-s1s2 normal   -p/a-soft,bs+  -extremity- right leg groin wound and leg wound noted, eschar present in right heel and right side of leg  -cns- non focal         A/P    #Anemia-Anemia due to chronic disease or gi bleed   -stable     #fever- possibly from wound infection - OM of calcaneum  -possible amputation procedure today    #DVT pr-heparin    #hypernatremia- monitor it, free water intake

## 2017-06-27 NOTE — PROGRESS NOTE ADULT - ASSESSMENT
81 y/o male with h/o CAD s/p stents (LAD, RCA bare metal around 9/16), PVD (RLE stent/ angioplasty) complicated with poorly healing right inguinal wound s/p prior surgical debridment (by Dr Siu 5/20 ) [email or web address].Paul not on anticoagulation due to GI bleeding, Hypertension, COPD on home O2 2L, SHAYY on nocturnal BIPAP, Chronic diastolic CHF, Hyperlipidemia, PVD, Iron deficiency anemia, Chronic back pain, Gout, BPH, CKD III with baseline Cr 2, recent 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 was admitted on 6/16 for worsening anemia with Hb 6, worsening leg pain from ulcers and worsening renal function. He has gradual onset  progressive fatigue over the last couple of weeks. Patient is unable to ambulate because of his leg ulcers. In ER, he was started on vancomycin IV and zosyn.    1. Right inguinal open wound with probable infection with mixed GNR, corynebacterium spp and EN spp. Right heel ulcer with underlying chronic OM.  -inguinal wound culture shows mixed GNR, EN spp and corynebacterium spp  -on vancomycin 500 mg IV q12h and zosyn 3.375 gm IV q8h # 10  -tolerating abx well so far; no side effects noted  -vancomycin trough level is therapeutic  -podiatry evaluation appreciated  -for BKA today  -local wound care per surgery  -continue IV abx coverage for now  -monitor temps  -f/u CBC  -supportive care  2. Other issues: CAD s/p stents, PVD, Brianna not on anticoagulation due to GI bleeding, Hypertension, COPD, SHAYY on nocturnal BIPAP, Chronic diastolic CHF, Hyperlipidemia, PVD, Iron deficiency anemia, Chronic back pain, Gout, BPH, CKD III   -care per medicine

## 2017-06-27 NOTE — BRIEF OPERATIVE NOTE - PROCEDURE
Debridement and application of dressing  06/20/2017  excisional debridement of R groin wound, application of VAC  Active  MGENNARO
Above knee amputation  06/27/2017  R AKA  Active  MANFRED

## 2017-06-27 NOTE — PROGRESS NOTE ADULT - SUBJECTIVE AND OBJECTIVE BOX
Patient is a 82y old  Male who presents with a chief complaint of Right Leg Venous Ulcers (17 Jun 2017 07:52)      HPI:  82 year old male with history of CAD s/p stents (LAD, RCA bare metal around 9/16),PVD (RLE stent/ angioplasty and surgical debridment by Dr Siu 5/20 ) Brianna not on anticoagulation due to GI bleeding, Hypertension, COPD on home O2 2L, SHAYY on nocturnal BIPAP, ex-smoker (smoked 1ppd X 50 years, quit 22 years ago),  Chronic diastolic CHF, Hyperlipidemia, PVD, Iron deficiency anemia, Chronic back pain, Gout, BPH, CKD III with baseline Cr 2. Was ecently admitted to  on  4/17 with anemia of GI bleeding, RLE and RUE DVTs,( received pRBCs and IVC filter discharged to rehab with aspirin) was readmitted to   ER on 5/4/17 for worsening anemia with Hb 6, worsening leg pain from ulcers and worsening renal function Cr 3.99 now presents from [facility] with anemia (7.7 on labs today decreasing from 9.5 over past few weeks). Pt c/o gradual onset  progressive fatigue over the last couple of weeks. Patient is unable to ambulate because of his leg cellulitis and ulcers. He denies any shortness of breath, palpitations / chest pain / light headedness. According to the daughter the attendants at [facility] did notice dark stools for the last couple of days. Patient denies any abdominal pain or change in bowel or urinary habits.  In ED, + guaiac. (17 Jun 2017 00:37)      PAST MEDICAL & SURGICAL HISTORY:  Sepsis, due to unspecified organism: 2/2 poorly healing wounds b/l  Dyspepsia: On moderate exertion.  Sleep apnea, obstructive: Requires home 02 therapy, and treatment with BIPAP  Atelectasis  Pleural effusion, bilateral  Respiratory failure  Peripheral edema  CRI (chronic renal insufficiency)  Gout  Benign prostatic hypertrophy  Spinal stenosis  Hypercholesterolemia  GERD (gastroesophageal reflux disease)  CAD (coronary artery disease)  Hypertension  S/P angioplasty with stent  Cataract of left eye  Prostate: Surgery green light procedure.  S/P rotator cuff surgery: Right  S/P angioplasty      MEDICATIONS  (STANDING):  allopurinol 100 milliGRAM(s) Oral daily  doxazosin 8 milliGRAM(s) Oral at bedtime  fenofibrate Tablet 145 milliGRAM(s) Oral daily  ferrous    sulfate 325 milliGRAM(s) Oral two times a day with meals  gabapentin 300 milliGRAM(s) Oral daily  hydrALAZINE 50 milliGRAM(s) Oral daily  HYDROmorphone   Tablet 2 milliGRAM(s) Oral every 6 hours  isosorbide   mononitrate ER Tablet (IMDUR) 120 milliGRAM(s) Oral daily  pantoprazole  Injectable 40 milliGRAM(s) IV Push every 12 hours  pramipexole 0.125 milliGRAM(s) Oral daily  senna 2 Tablet(s) Oral at bedtime  simvastatin 10 milliGRAM(s) Oral at bedtime  multivitamin 1 Tablet(s) Oral daily  diltiazem    milliGRAM(s) Oral daily  heparin  Injectable 5000 Unit(s) SubCutaneous every 8 hours  piperacillin/tazobactam IVPB. 3.375 Gram(s) IV Intermittent every 8 hours  vancomycin  IVPB 500 milliGRAM(s) IV Intermittent every 12 hours  ammonium lactate 12% Lotion 1 Application(s) Topical daily  buDESOnide   0.5 milliGRAM(s) Respule 0.5 milliGRAM(s) Inhalation two times a day  aspirin  chewable 81 milliGRAM(s) Oral daily  metoprolol 25 milliGRAM(s) Oral two times a day  zinc sulfate 220 milliGRAM(s) Oral daily  ascorbic acid 500 milliGRAM(s) Oral daily  sodium chloride 0.45%. 1000 milliLiter(s) (75 mL/Hr) IV Continuous <Continuous>    MEDICATIONS  (PRN):  acetaminophen   Tablet. 500 milliGRAM(s) Oral every 6 hours PRN Mild Pain (1 - 3)  ALBUTerol    90 MICROgram(s) HFA Inhaler 2 Puff(s) Inhalation every 6 hours PRN Shortness of Breath  nitroglycerin     SubLingual 0.4 milliGRAM(s) SubLingual every 5 minutes PRN Chest Pain  HYDROmorphone  Injectable 0.5 milliGRAM(s) IV Push every 6 hours PRN Severe Pain (7 - 10)      Allergies    No Known Allergies    Intolerances        SOCIAL HISTORY:    FAMILY HISTORY:  No pertinent family history in first degree relatives      REVIEW OF SYSTEMS:    CONSTITUTIONAL: No weakness, fevers or chills  EYES/ENT: No visual changes;  No vertigo or throat pain   NECK: No pain or stiffness  RESPIRATORY: No cough, wheezing, hemoptysis; No shortness of breath  CARDIOVASCULAR: No chest pain or palpitations  GENITOURINARY: No dysuria, frequency or hematuria  NEUROLOGICAL: No numbness or weakness  SKIN: No itching, burning, rashes, or lesions   All other review of systems is negative unless indicated above.    Vital Signs Last 24 Hrs  T(C): 36.7 (27 Jun 2017 04:35), Max: 37.3 (26 Jun 2017 17:20)  T(F): 98 (27 Jun 2017 04:35), Max: 99.1 (26 Jun 2017 17:20)  HR: 76 (27 Jun 2017 08:02) (61 - 83)  BP: 172/55 (27 Jun 2017 04:35) (153/46 - 172/55)  BP(mean): --  RR: 18 (27 Jun 2017 04:35) (16 - 18)  SpO2: 96% (27 Jun 2017 04:35) (96% - 99%)    PHYSICAL EXAM:    Constitutional: NAD, well-developed, obese  HEENT: EOMI, throat clear  Neck: No LAD, supple  Respiratory: CTA and P  Cardiovascular: S1 and S2, RRR, no M  Gastrointestinal: BS+, soft, NT/ND, neg HSM,  Extremities:pos peripheral edema, neg clubing, cyanosis    Neurological: A/O x 3, no focal deficits  Psychiatric: Normal mood, normal affect  Skin: No rashes    LABS:  CBC Full  -  ( 27 Jun 2017 07:05 )  WBC Count : 7.5 K/uL  Hemoglobin : 8.5 g/dL  Hematocrit : 27.2 %  Platelet Count - Automated : 277 K/uL  Mean Cell Volume : 90.5 fl  Mean Cell Hemoglobin : 28.5 pg  Mean Cell Hemoglobin Concentration : 31.4 gm/dL  Auto Neutrophil # : x  Auto Lymphocyte # : x  Auto Monocyte # : x  Auto Eosinophil # : x  Auto Basophil # : x  Auto Neutrophil % : x  Auto Lymphocyte % : x  Auto Monocyte % : x  Auto Eosinophil % : x  Auto Basophil % : x    06-27    148<H>  |  113<H>  |  32<H>  ----------------------------<  106<H>  3.7   |  28  |  1.38<H>    Ca    7.9<L>      27 Jun 2017 07:05      PTT - ( 27 Jun 2017 07:05 )  PTT:35.1 sec        RADIOLOGY & ADDITIONAL STUDIES:

## 2017-06-27 NOTE — BRIEF OPERATIVE NOTE - OPERATION/FINDINGS
marked edema of tissues with viable, non infected margins of resection
necrotic SQ tissue and fat but no gross purulence; patch well incorporated

## 2017-06-27 NOTE — BRIEF OPERATIVE NOTE - PRE-OP DX
Osteomyelitis of ankle or foot, acute, right  06/27/2017  R calcaneus  Active  Jason Clement  Ulcer of groin incision with fat layer exposed  06/20/2017  status post thromboendarterectomy of R CFA with post operative dehiscence  Active  Jason Clement

## 2017-06-27 NOTE — PROGRESS NOTE ADULT - SUBJECTIVE AND OBJECTIVE BOX
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 He has gradual onset  progressive fatigue over the last couple of weeks. Patient is unable to ambulate because of his leg ulcers. In ER, he was started on vancomycin IV and zosyn.    Lying in bed in NAD  No fever or chills  No new complaints  Alert    MEDICATIONS  (STANDING):  allopurinol 100 milliGRAM(s) Oral daily  doxazosin 8 milliGRAM(s) Oral at bedtime  fenofibrate Tablet 145 milliGRAM(s) Oral daily  ferrous    sulfate 325 milliGRAM(s) Oral two times a day with meals  gabapentin 300 milliGRAM(s) Oral daily  hydrALAZINE 50 milliGRAM(s) Oral daily  HYDROmorphone   Tablet 2 milliGRAM(s) Oral every 6 hours  isosorbide   mononitrate ER Tablet (IMDUR) 120 milliGRAM(s) Oral daily  pantoprazole  Injectable 40 milliGRAM(s) IV Push every 12 hours  pramipexole 0.125 milliGRAM(s) Oral daily  senna 2 Tablet(s) Oral at bedtime  simvastatin 10 milliGRAM(s) Oral at bedtime  multivitamin 1 Tablet(s) Oral daily  diltiazem    milliGRAM(s) Oral daily  heparin  Injectable 5000 Unit(s) SubCutaneous every 8 hours  piperacillin/tazobactam IVPB. 3.375 Gram(s) IV Intermittent every 8 hours  vancomycin  IVPB 500 milliGRAM(s) IV Intermittent every 12 hours  ammonium lactate 12% Lotion 1 Application(s) Topical daily  buDESOnide   0.5 milliGRAM(s) Respule 0.5 milliGRAM(s) Inhalation two times a day  aspirin  chewable 81 milliGRAM(s) Oral daily  metoprolol 25 milliGRAM(s) Oral two times a day  zinc sulfate 220 milliGRAM(s) Oral daily  ascorbic acid 500 milliGRAM(s) Oral daily  sodium chloride 0.45%. 1000 milliLiter(s) (75 mL/Hr) IV Continuous <Continuous>    MEDICATIONS  (PRN):  acetaminophen   Tablet. 500 milliGRAM(s) Oral every 6 hours PRN Mild Pain (1 - 3)  ALBUTerol    90 MICROgram(s) HFA Inhaler 2 Puff(s) Inhalation every 6 hours PRN Shortness of Breath  nitroglycerin     SubLingual 0.4 milliGRAM(s) SubLingual every 5 minutes PRN Chest Pain  HYDROmorphone  Injectable 0.5 milliGRAM(s) IV Push every 6 hours PRN Severe Pain (7 - 10)      Vital Signs Last 24 Hrs  T(C): 36.7 (2017 04:35), Max: 37.3 (2017 17:20)  T(F): 98 (2017 04:35), Max: 99.1 (2017 17:20)  HR: 76 (2017 08:02) (61 - 83)  BP: 172/55 (2017 04:35) (153/46 - 172/55)  BP(mean): --  RR: 18 (2017 04:35) (16 - 18)  SpO2: 96% (2017 04:35) (96% - 99%)    Physical Exam:    Constitutional: frail looking  HEENT: NC/AT, EOMI, PERRLA  Neck: supple  Back: no tenderness  Respiratory: decreased BS at bases  Cardiovascular: S1S2 regular, no murmurs  Abdomen: soft, not tender, not distended, positive BS  Genitourinary: right inguinal open wound packed  Rectal: deferred  Musculoskeletal: no muscle tenderness, no joint swelling or tenderness  Extremities: 1+ pedal edema; right inguinal open wound - deep; scant discharge; right heel necrotic ulcer  Neurological: AxOx3, moving all extremities, no focal deficits  Skin: no rashes    Labs:                        8.6    7.2   )-----------( 284      ( 2017 07:16 )             26.6     06-26    145  |  112<H>  |  30<H>  ----------------------------<  98  3.9   |  27  |  1.25    Ca    7.8<L>      2017 07:16                          10.2   8.0   )-----------( 366      ( 2017 06:22 )             32.7     06-    148<H>  |  115<H>  |  29<H>  ----------------------------<  133<H>  3.8   |  23  |  1.29    Ca    7.7<L>      2017 06:22                          8.3    6.5   )-----------( 367      ( 2017 06:25 )             25.6     06-20    146<H>  |  113<H>  |  32<H>  ----------------------------<  118<H>  3.2<L>   |  27  |  1.33<H>    Ca    7.9<L>      2017 06:25         Vancomycin Level, Trough: 14.2 ug/mL ( @ 05:00)      Cultures:                       8.1    7.0   )-----------( 331      ( 2017 05:00 )             25.2     06-19    151<H>  |  116<H>  |  40<H>  ----------------------------<  97  3.6   |  27  |  1.38<H>    Ca    8.1<L>      2017 05:00         Vancomycin Level, Trough: 14.2 ug/mL ( @ 05:00)                          8.2    8.5   )-----------( 312      ( 2017 03:54 )             25.0     06-17    148<H>  |  114<H>  |  53<H>  ----------------------------<  129<H>  3.8   |  28  |  1.65<H>    Ca    8.2<L>      2017 03:54  Phos  1.9       Mg     2.1         TPro  5.2<L>  /  Alb  1.7<L>  /  TBili  0.3  /  DBili  x   /  AST  20  /  ALT  12  /  AlkPhos  57       LIVER FUNCTIONS - ( 2017 03:54 )  Alb: 1.7 g/dL / Pro: 5.2 gm/dL / ALK PHOS: 57 U/L / ALT: 12 U/L / AST: 20 U/L / GGT: x           Urinalysis Basic - ( 2017 10:36 )    Color: Yellow / Appearance: Clear / S.010 / pH: x  Gluc: x / Ketone: Negative  / Bili: Negative / Urobili: Negative mg/dL   Blood: x / Protein: 15 mg/dL / Nitrite: Negative   Culture - Other (17 @ 12:50)    Specimen Source: .Other right groin wound    Culture Results:   Moderate Mixed gram negative rods  Few Enterococcus species  Rare Corynebacterium species    Leuk Esterase: Trace / RBC: x / WBC 0-2   Sq Epi: x / Non Sq Epi: x / Bacteria: x    Culture - Blood (17 @ 03:54)    Specimen Source: .Blood Blood    Culture Results:   No growth to date.    Culture - Other (17 @ 12:50)    Specimen Source: .Other right groin wound    Culture Results:   Moderate Mixed gram negative rods  Few Enterococcus species          Radiology:    Right foot MRI:  1.  Large skin ulcer at the posterior aspect of the heel.  2.  Fluid tracking from the skin ulcer towards the medial calcaneus   suspicious for small abscess.  3.  Osteomyelitis of the posterior calcaneus.  4.  Osteonecrosis within the posterior calcaneus.  5.  Partial tearing of the lateral Achilles insertion.  6.  Full-thickness tearing of the lateral cord of the plantar fascia with   partial tear of the central cord.    Advanced directives addressed: full resuscitation

## 2017-06-27 NOTE — PROGRESS NOTE ADULT - ASSESSMENT
82 year old male with a PMH of CAD s/p stents (LAD, RCA bare metal around 9/16),PVD (RLE stent/ angioplasty and surgical debridment by Dr Siu 5/20 ) A.Fib not on anticoagulation due to GI bleeding, Hypertension, COPD on home O2 2L, SHAYY on nocturnal BIPAP, ex-smoker (smoked 1ppd X 50 years, quit 22 years ago),  Chronic diastolic CHF, Hyperlipidemia, PVD, Iron deficiency anemia, Chronic back pain, Gout, BPH, CKD III with baseline Cr 2.  is seen for    1. Partial tear of Achilles tendon, right; mixed necrotic granular ulcerations of the right lower leg and foot  -Pt was seen and examined. Plan was discussed with attending Dr. Fermin.  -MRI of RLE reviewed; positive for OM of the posterior calcaneus and partial tear of the Achilles tendon.  -Pt is scheduled for RLE AKA today with Dr. Clement.  -Betadine and allevyn pad applied to the left postero plantar heel. Z-flex boots reapplied.  -No acute intervention for Achilles partial tear at this time due to the extensive RLE ulcers.  -Continue use of B/L zflex boots to offload B/L heels. Z-flex boots reapplied.  -Recommend IVabx per ID, appreciated.  -Podiatry to follow.    2. Other issues  -Care per Medicine, appreciated.

## 2017-06-28 DIAGNOSIS — E87.0 HYPEROSMOLALITY AND HYPERNATREMIA: ICD-10-CM

## 2017-06-28 LAB
ANION GAP SERPL CALC-SCNC: 13 MMOL/L — SIGNIFICANT CHANGE UP (ref 5–17)
BASOPHILS # BLD AUTO: 0.1 K/UL — SIGNIFICANT CHANGE UP (ref 0–0.2)
BASOPHILS NFR BLD AUTO: 1.2 % — SIGNIFICANT CHANGE UP (ref 0–2)
BUN SERPL-MCNC: 32 MG/DL — HIGH (ref 7–23)
CALCIUM SERPL-MCNC: 7.9 MG/DL — LOW (ref 8.5–10.1)
CHLORIDE SERPL-SCNC: 115 MMOL/L — HIGH (ref 96–108)
CO2 SERPL-SCNC: 22 MMOL/L — SIGNIFICANT CHANGE UP (ref 22–31)
CREAT SERPL-MCNC: 1.4 MG/DL — HIGH (ref 0.5–1.3)
EOSINOPHIL # BLD AUTO: 0.3 K/UL — SIGNIFICANT CHANGE UP (ref 0–0.5)
EOSINOPHIL NFR BLD AUTO: 3.8 % — SIGNIFICANT CHANGE UP (ref 0–6)
GLUCOSE SERPL-MCNC: 69 MG/DL — LOW (ref 70–99)
HCT VFR BLD CALC: 27 % — LOW (ref 39–50)
HGB BLD-MCNC: 8.7 G/DL — LOW (ref 13–17)
LYMPHOCYTES # BLD AUTO: 0.4 K/UL — LOW (ref 1–3.3)
LYMPHOCYTES # BLD AUTO: 4.9 % — LOW (ref 13–44)
MCHC RBC-ENTMCNC: 29.5 PG — SIGNIFICANT CHANGE UP (ref 27–34)
MCHC RBC-ENTMCNC: 32.3 GM/DL — SIGNIFICANT CHANGE UP (ref 32–36)
MCV RBC AUTO: 91.5 FL — SIGNIFICANT CHANGE UP (ref 80–100)
MONOCYTES # BLD AUTO: 0.6 K/UL — SIGNIFICANT CHANGE UP (ref 0–0.9)
MONOCYTES NFR BLD AUTO: 6.6 % — SIGNIFICANT CHANGE UP (ref 2–14)
NEUTROPHILS # BLD AUTO: 7.4 K/UL — SIGNIFICANT CHANGE UP (ref 1.8–7.4)
NEUTROPHILS NFR BLD AUTO: 83.5 % — HIGH (ref 43–77)
PLATELET # BLD AUTO: 254 K/UL — SIGNIFICANT CHANGE UP (ref 150–400)
POTASSIUM SERPL-MCNC: 3.8 MMOL/L — SIGNIFICANT CHANGE UP (ref 3.5–5.3)
POTASSIUM SERPL-SCNC: 3.8 MMOL/L — SIGNIFICANT CHANGE UP (ref 3.5–5.3)
RBC # BLD: 2.96 M/UL — LOW (ref 4.2–5.8)
RBC # FLD: 17.1 % — HIGH (ref 10.3–14.5)
SODIUM SERPL-SCNC: 143 MMOL/L — SIGNIFICANT CHANGE UP (ref 135–145)
SODIUM SERPL-SCNC: 150 MMOL/L — HIGH (ref 135–145)
WBC # BLD: 8.9 K/UL — SIGNIFICANT CHANGE UP (ref 3.8–10.5)
WBC # FLD AUTO: 8.9 K/UL — SIGNIFICANT CHANGE UP (ref 3.8–10.5)

## 2017-06-28 RX ORDER — SODIUM CHLORIDE 9 MG/ML
1000 INJECTION, SOLUTION INTRAVENOUS
Qty: 0 | Refills: 0 | Status: DISCONTINUED | OUTPATIENT
Start: 2017-06-28 | End: 2017-06-28

## 2017-06-28 RX ORDER — VANCOMYCIN HCL 1 G
500 VIAL (EA) INTRAVENOUS EVERY 12 HOURS
Qty: 0 | Refills: 0 | Status: DISCONTINUED | OUTPATIENT
Start: 2017-06-28 | End: 2017-06-30

## 2017-06-28 RX ADMIN — Medication 145 MILLIGRAM(S): at 12:20

## 2017-06-28 RX ADMIN — PRAMIPEXOLE DIHYDROCHLORIDE 0.12 MILLIGRAM(S): 0.12 TABLET ORAL at 12:18

## 2017-06-28 RX ADMIN — HYDROMORPHONE HYDROCHLORIDE 2 MILLIGRAM(S): 2 INJECTION INTRAMUSCULAR; INTRAVENOUS; SUBCUTANEOUS at 06:14

## 2017-06-28 RX ADMIN — HYDROMORPHONE HYDROCHLORIDE 2 MILLIGRAM(S): 2 INJECTION INTRAMUSCULAR; INTRAVENOUS; SUBCUTANEOUS at 00:47

## 2017-06-28 RX ADMIN — Medication 81 MILLIGRAM(S): at 12:18

## 2017-06-28 RX ADMIN — HYDROMORPHONE HYDROCHLORIDE 2 MILLIGRAM(S): 2 INJECTION INTRAMUSCULAR; INTRAVENOUS; SUBCUTANEOUS at 13:28

## 2017-06-28 RX ADMIN — SENNA PLUS 2 TABLET(S): 8.6 TABLET ORAL at 21:29

## 2017-06-28 RX ADMIN — Medication 50 MILLIGRAM(S): at 06:14

## 2017-06-28 RX ADMIN — Medication 100 MILLIGRAM(S): at 12:19

## 2017-06-28 RX ADMIN — Medication 0.5 MILLIGRAM(S): at 08:15

## 2017-06-28 RX ADMIN — Medication 100 MILLIGRAM(S): at 17:36

## 2017-06-28 RX ADMIN — SIMVASTATIN 10 MILLIGRAM(S): 20 TABLET, FILM COATED ORAL at 21:29

## 2017-06-28 RX ADMIN — Medication 325 MILLIGRAM(S): at 11:31

## 2017-06-28 RX ADMIN — HYDROMORPHONE HYDROCHLORIDE 2 MILLIGRAM(S): 2 INJECTION INTRAMUSCULAR; INTRAVENOUS; SUBCUTANEOUS at 18:30

## 2017-06-28 RX ADMIN — HYDROMORPHONE HYDROCHLORIDE 2 MILLIGRAM(S): 2 INJECTION INTRAMUSCULAR; INTRAVENOUS; SUBCUTANEOUS at 07:30

## 2017-06-28 RX ADMIN — HEPARIN SODIUM 5000 UNIT(S): 5000 INJECTION INTRAVENOUS; SUBCUTANEOUS at 14:56

## 2017-06-28 RX ADMIN — HYDROMORPHONE HYDROCHLORIDE 2 MILLIGRAM(S): 2 INJECTION INTRAMUSCULAR; INTRAVENOUS; SUBCUTANEOUS at 12:18

## 2017-06-28 RX ADMIN — HEPARIN SODIUM 5000 UNIT(S): 5000 INJECTION INTRAVENOUS; SUBCUTANEOUS at 21:29

## 2017-06-28 RX ADMIN — HEPARIN SODIUM 5000 UNIT(S): 5000 INJECTION INTRAVENOUS; SUBCUTANEOUS at 06:14

## 2017-06-28 RX ADMIN — Medication 25 MILLIGRAM(S): at 17:36

## 2017-06-28 RX ADMIN — PIPERACILLIN AND TAZOBACTAM 25 GRAM(S): 4; .5 INJECTION, POWDER, LYOPHILIZED, FOR SOLUTION INTRAVENOUS at 14:52

## 2017-06-28 RX ADMIN — PIPERACILLIN AND TAZOBACTAM 25 GRAM(S): 4; .5 INJECTION, POWDER, LYOPHILIZED, FOR SOLUTION INTRAVENOUS at 06:14

## 2017-06-28 RX ADMIN — Medication 25 MILLIGRAM(S): at 06:14

## 2017-06-28 RX ADMIN — Medication 8 MILLIGRAM(S): at 21:54

## 2017-06-28 RX ADMIN — PIPERACILLIN AND TAZOBACTAM 25 GRAM(S): 4; .5 INJECTION, POWDER, LYOPHILIZED, FOR SOLUTION INTRAVENOUS at 21:30

## 2017-06-28 RX ADMIN — HYDROMORPHONE HYDROCHLORIDE 2 MILLIGRAM(S): 2 INJECTION INTRAMUSCULAR; INTRAVENOUS; SUBCUTANEOUS at 01:45

## 2017-06-28 RX ADMIN — Medication 1 APPLICATION(S): at 12:20

## 2017-06-28 RX ADMIN — GABAPENTIN 300 MILLIGRAM(S): 400 CAPSULE ORAL at 12:18

## 2017-06-28 RX ADMIN — HYDROMORPHONE HYDROCHLORIDE 2 MILLIGRAM(S): 2 INJECTION INTRAMUSCULAR; INTRAVENOUS; SUBCUTANEOUS at 17:34

## 2017-06-28 RX ADMIN — ISOSORBIDE MONONITRATE 120 MILLIGRAM(S): 60 TABLET, EXTENDED RELEASE ORAL at 12:19

## 2017-06-28 RX ADMIN — Medication 0.5 MILLIGRAM(S): at 19:45

## 2017-06-28 RX ADMIN — Medication 325 MILLIGRAM(S): at 17:36

## 2017-06-28 RX ADMIN — HYDROMORPHONE HYDROCHLORIDE 2 MILLIGRAM(S): 2 INJECTION INTRAMUSCULAR; INTRAVENOUS; SUBCUTANEOUS at 23:25

## 2017-06-28 NOTE — PROGRESS NOTE ADULT - SUBJECTIVE AND OBJECTIVE BOX
Patient is a 82y old  Male who presents with a [facility] 7.7 (2017 00:37)    HPI:  83 y/o male with h/o CAD s/p stents (LAD, RCA bare metal around ), PVD (RLE stent/ angioplasty) complicated with poorly healing right inguinal wound s/p prior surgical debridment (by Dr Siu  ) A.Fib not on anticoagulation due to GI bleeding, Hypertension, COPD on home O2 2L, SHAYY on nocturnal BIPAP, Chronic diastolic CHF, Hyperlipidemia, PVD, Iron deficiency anemia, Chronic back pain, Gout, BPH, CKD III with baseline Cr 2, 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 He has gradual onset  progressive fatigue over the last couple of weeks. Patient is unable to ambulate because of his leg ulcers. In ER, he was started on vancomycin IV and zosyn.    s/p right leg AKA  Lying in bed in NAD  Has mild right thigh pain  Alert    MEDICATIONS  (STANDING):  allopurinol 100 milliGRAM(s) Oral daily  doxazosin 8 milliGRAM(s) Oral at bedtime  fenofibrate Tablet 145 milliGRAM(s) Oral daily  ferrous    sulfate 325 milliGRAM(s) Oral two times a day with meals  gabapentin 300 milliGRAM(s) Oral daily  hydrALAZINE 50 milliGRAM(s) Oral daily  HYDROmorphone   Tablet 2 milliGRAM(s) Oral every 6 hours  isosorbide   mononitrate ER Tablet (IMDUR) 120 milliGRAM(s) Oral daily  pantoprazole  Injectable 40 milliGRAM(s) IV Push every 12 hours  pramipexole 0.125 milliGRAM(s) Oral daily  senna 2 Tablet(s) Oral at bedtime  simvastatin 10 milliGRAM(s) Oral at bedtime  multivitamin 1 Tablet(s) Oral daily  diltiazem    milliGRAM(s) Oral daily  heparin  Injectable 5000 Unit(s) SubCutaneous every 8 hours  piperacillin/tazobactam IVPB. 3.375 Gram(s) IV Intermittent every 8 hours  vancomycin  IVPB 500 milliGRAM(s) IV Intermittent every 12 hours  ammonium lactate 12% Lotion 1 Application(s) Topical daily  buDESOnide   0.5 milliGRAM(s) Respule 0.5 milliGRAM(s) Inhalation two times a day  aspirin  chewable 81 milliGRAM(s) Oral daily  metoprolol 25 milliGRAM(s) Oral two times a day  zinc sulfate 220 milliGRAM(s) Oral daily  ascorbic acid 500 milliGRAM(s) Oral daily  sodium chloride 0.45%. 1000 milliLiter(s) (75 mL/Hr) IV Continuous <Continuous>    MEDICATIONS  (PRN):  acetaminophen   Tablet. 500 milliGRAM(s) Oral every 6 hours PRN Mild Pain (1 - 3)  ALBUTerol    90 MICROgram(s) HFA Inhaler 2 Puff(s) Inhalation every 6 hours PRN Shortness of Breath  nitroglycerin     SubLingual 0.4 milliGRAM(s) SubLingual every 5 minutes PRN Chest Pain  HYDROmorphone  Injectable 0.5 milliGRAM(s) IV Push every 6 hours PRN Severe Pain (7 - 10)      Vital Signs Last 24 Hrs  T(C): 36.7 (2017 04:35), Max: 37.3 (2017 17:20)  T(F): 98 (2017 04:35), Max: 99.1 (2017 17:20)  HR: 76 (2017 08:02) (61 - 83)  BP: 172/55 (2017 04:35) (153/46 - 172/55)  BP(mean): --  RR: 18 (2017 04:35) (16 - 18)  SpO2: 96% (2017 04:35) (96% - 99%)    Physical Exam:    Constitutional: frail looking  HEENT: NC/AT, EOMI, PERRLA  Neck: supple  Back: no tenderness  Respiratory: decreased BS at bases  Cardiovascular: S1S2 regular, no murmurs  Abdomen: soft, not tender, not distended, positive BS  Genitourinary: right inguinal open wound packed  Rectal: deferred  Musculoskeletal: no muscle tenderness, no joint swelling or tenderness  Extremities: 1+ pedal edema; right inguinal open wound - deep; scant discharge; right AKA  Neurological: AxOx3, moving all extremities, no focal deficits  Skin: no rashes    Labs:                        8.7    8.9   )-----------( 254      ( 2017 06:04 )             27.0     06-28    150<H>  |  115<H>  |  32<H>  ----------------------------<  69<L>  3.8   |  22  |  1.40<H>    Ca    7.9<L>      2017 06:04                          8.6    7.2   )-----------( 284      ( 2017 07:16 )             26.6     06-26    145  |  112<H>  |  30<H>  ----------------------------<  98  3.9   |  27  |  1.25    Ca    7.8<L>      2017 07:16                          10.2   8.0   )-----------( 366      ( 2017 06:22 )             32.7     06-    148<H>  |  115<H>  |  29<H>  ----------------------------<  133<H>  3.8   |  23  |  1.29    Ca    7.7<L>      2017 06:22                          8.3    6.5   )-----------( 367      ( 2017 06:25 )             25.6     06-20    146<H>  |  113<H>  |  32<H>  ----------------------------<  118<H>  3.2<L>   |  27  |  1.33<H>    Ca    7.9<L>      2017 06:25         Vancomycin Level, Trough: 14.2 ug/mL ( @ 05:00)      Cultures:                       8.1    7.0   )-----------( 331      ( 2017 05:00 )             25.2     06-19    151<H>  |  116<H>  |  40<H>  ----------------------------<  97  3.6   |  27  |  1.38<H>    Ca    8.1<L>      2017 05:00         Vancomycin Level, Trough: 14.2 ug/mL ( @ 05:00)                          8.2    8.5   )-----------( 312      ( 2017 03:54 )             25.0         148<H>  |  114<H>  |  53<H>  ----------------------------<  129<H>  3.8   |  28  |  1.65<H>    Ca    8.2<L>      2017 03:54  Phos  1.9       Mg     2.1         TPro  5.2<L>  /  Alb  1.7<L>  /  TBili  0.3  /  DBili  x   /  AST  20  /  ALT  12  /  AlkPhos  57  17     LIVER FUNCTIONS - ( 2017 03:54 )  Alb: 1.7 g/dL / Pro: 5.2 gm/dL / ALK PHOS: 57 U/L / ALT: 12 U/L / AST: 20 U/L / GGT: x           Urinalysis Basic - ( 2017 10:36 )    Color: Yellow / Appearance: Clear / S.010 / pH: x  Gluc: x / Ketone: Negative  / Bili: Negative / Urobili: Negative mg/dL   Blood: x / Protein: 15 mg/dL / Nitrite: Negative   Culture - Other (17 @ 12:50)    Specimen Source: .Other right groin wound    Culture Results:   Moderate Mixed gram negative rods  Few Enterococcus species  Rare Corynebacterium species    Leuk Esterase: Trace / RBC: x / WBC 0-2   Sq Epi: x / Non Sq Epi: x / Bacteria: x    Culture - Blood (17 @ 03:54)    Specimen Source: .Blood Blood    Culture Results:   No growth to date.    Culture - Other (17 @ 12:50)    Specimen Source: .Other right groin wound    Culture Results:   Moderate Mixed gram negative rods  Few Enterococcus species          Radiology:    Right foot MRI:  1.  Large skin ulcer at the posterior aspect of the heel.  2.  Fluid tracking from the skin ulcer towards the medial calcaneus   suspicious for small abscess.  3.  Osteomyelitis of the posterior calcaneus.  4.  Osteonecrosis within the posterior calcaneus.  5.  Partial tearing of the lateral Achilles insertion.  6.  Full-thickness tearing of the lateral cord of the plantar fascia with   partial tear of the central cord.    Advanced directives addressed: full resuscitation

## 2017-06-28 NOTE — PROGRESS NOTE ADULT - SUBJECTIVE AND OBJECTIVE BOX
Chief Complaint/Reason for Admission: Sent from [facility] rehab because of low hemoglobin 7.7	  History of Present Illness: 	  82 year old male with history of CAD s/p stents (LAD, RCA bare metal around 9/16),PVD (RLE stent/ angioplasty and surgical debridment by Dr Siu 5/20 ) Brianna not on anticoagulation due to GI bleeding, Hypertension, COPD on home O2 2L, SHAYY on nocturnal BIPAP, ex-smoker (smoked 1ppd X 50 years, quit 22 years ago),  Chronic diastolic CHF, Hyperlipidemia, PVD, Iron deficiency anemia, Chronic back pain, Gout, BPH, CKD III with baseline Cr 2. Was ecently admitted to  on  4/17 with anemia of GI bleeding, RLE and RUE DVTs,( received pRBCs and IVC filter discharged to rehab with aspirin) was readmitted to   ER on 5/4/17 for worsening anemia with Hb 6, worsening leg pain from ulcers and worsening renal function Cr 3.99 now presents from [facility] with anemia (7.7 on labs today decreasing from 9.5 over past few weeks). Pt c/o gradual onset  progressive fatigue over the last couple of weeks. Patient is unable to ambulate because of his leg cellulitis and ulcers. He denies any shortness of breath, palpitations / chest pain / light headedness. According to the daughter the attendants at [facility] did notice dark stools for the last couple of days. Patient denies any abdominal pain or change in bowel or urinary habits.  In ED, + guaiac.     6/19 - Patient seen and examined. Reports 6 loose BM since this morning.  Patient has been placed on isolation until Cdiff has been rulled out.  Afebrile. hemodynamically stable.     6/20 - Patient going for muscle flap of groin area tonight after 1u PRBCs for anem.  Hemodynamically stable. Patient had low grade temp overnight.  On Vanco/Zosyn #3.  Tolerating abx well.  No Diarrhea, no rash.     6/21 - Patient seen and examined with daughter at bedside eating breakfast.  S/P right femoral wound debridement Day #1.  Wound vac in place and functional.  patient is feeling well with no complaints at this time. Has elevated BP, Will increased dose of Metoprolol to BID.  Podiatry consult placed to evaluate right achilles tendon rupture repair. Hemodynamically stable, afebrile. On Vanco/Zosyn day #4.    6/22 - Patient seen and examined.  Hemodynamically stable. NAD, Afebrile. On Vanco.Zosyn Day #5.  patient is feeling well.  Has been seen and Dr. Cardenas and may require further debridement and or AKA.  Patient is in pain despite being on Dilaudid standing q6hr. Will add Dilaudid for breakthrough pain.     6/23 - Patient seen and examined with daughter at bedside.  Had a long conversation with patient and daughter about long term goals of care.  Wound debridement vs AKA, Prosthetics, Physical therapy.  Patient is very deconditioned from chronic right LE wounds.  Patient would most likely benifit from AKA in the long term.  Hemodynamically stable.  afebrile. No overnight events.  Tolerating abx well.     6/24 - Patient seen and examined with daughter at bedside.  Discussed AKA with patient and daughter.  Patient is leaning towards having the amputation done but will decide in the next few days.  Hemodynamically stable, pain controlled, afebrile.  No overnight events.     6/26 - Patient seen and examined with daughter at bedside.  Patient had discussed AKA with Dr. Clement and has decided on having procedure done.  Time of surgery not yet determined. Hemodynamically stable, afebrile.  Patient is feeling well, tolerating PO and seen by PT.      6/27 - Patient seen and examined with family at bedside.  Family has arrived for moral support since patient is to undergo AKA today.  He is in good spirits and is in agreement with plan.  Family is very supportive.  Patient is hemodynamically stable, afebrile.  No overnight events.      6/28 - POD#1 S/P R AKA.  Patient seen and examined with daughter at bedside.  No overnight events.  AKA successful, patient tolerated procedure well with no post op complications.  Patient received 1unit PRBCs during procedure.  In good spirits.  Pain well controlled with Dilaudid. Hemodynamically stable, afebrile.     MEDICATIONS  (STANDING):  aspirin  chewable 81 milliGRAM(s) Oral daily  heparin  Injectable 5000 Unit(s) SubCutaneous every 8 hours  allopurinol 100 milliGRAM(s) Oral daily  doxazosin 8 milliGRAM(s) Oral at bedtime  fenofibrate Tablet 145 milliGRAM(s) Oral daily  ferrous    sulfate 325 milliGRAM(s) Oral two times a day with meals  gabapentin 300 milliGRAM(s) Oral daily  hydrALAZINE 50 milliGRAM(s) Oral daily  HYDROmorphone   Tablet 2 milliGRAM(s) Oral every 6 hours  isosorbide   mononitrate ER Tablet (IMDUR) 120 milliGRAM(s) Oral daily  pramipexole 0.125 milliGRAM(s) Oral daily  senna 2 Tablet(s) Oral at bedtime  simvastatin 10 milliGRAM(s) Oral at bedtime  piperacillin/tazobactam IVPB. 3.375 Gram(s) IV Intermittent every 8 hours  ammonium lactate 12% Lotion 1 Application(s) Topical daily  buDESOnide   0.5 milliGRAM(s) Respule 0.5 milliGRAM(s) Inhalation two times a day  metoprolol 25 milliGRAM(s) Oral two times a day  vancomycin  IVPB 500 milliGRAM(s) IV Intermittent every 12 hours    MEDICATIONS  (PRN):  HYDROmorphone  Injectable 1 milliGRAM(s) IV Push every 3 hours PRN Moderate Pain (4 - 6)  acetaminophen   Tablet. 500 milliGRAM(s) Oral every 6 hours PRN Mild Pain (1 - 3)  ALBUTerol    90 MICROgram(s) HFA Inhaler 2 Puff(s) Inhalation every 6 hours PRN Shortness of Breath  HYDROmorphone  Injectable 0.5 milliGRAM(s) IV Push every 6 hours PRN Severe Pain (7 - 10)    ICU Vital Signs Last 24 Hrs  T(C): 36.6 (28 Jun 2017 20:43), Max: 36.7 (28 Jun 2017 17:56)  T(F): 97.8 (28 Jun 2017 20:43), Max: 98 (28 Jun 2017 17:56)  HR: 64 (28 Jun 2017 21:00) (64 - 84)  BP: 150/43 (28 Jun 2017 20:00) (145/39 - 161/51)  BP(mean): 72 (28 Jun 2017 20:00) (66 - 80)  ABP: --  ABP(mean): --  RR: 15 (28 Jun 2017 21:00) (15 - 23)  SpO2: 97% (28 Jun 2017 21:00) (91% - 100%)    GEN: NAD, comfortable, resting in bed  CV: S1S2, RRR, no mumur  RESP: good air movement, CTABL, no rales, rhonchi or wheezing  ABD: +BS, soft, ND, NT, no guarding, no rigidity  Skin: R groin wound vac in place and functional; Dressing in place s/p AKA  EXT: +2 radial and pedial pulses, no edema, no calve tenderness                                                          8.7    8.9   )-----------( 254      ( 28 Jun 2017 06:04 )             27.0     28 Jun 2017 19:51    143    |  x      |  x      ----------------------------<  x      x       |  x      |  x        Ca    7.9        28 Jun 2017 06:04        PTT - ( 27 Jun 2017 07:05 )  PTT:35.1 sec  CAPILLARY BLOOD GLUCOSE      EXAM:  LWR EXT (MRI) RT W O CON                        PROCEDURE DATE:  06/18/2017    IMPRESSION:   1.  Large skin ulcer at the posterior aspect of the heel.  2.  Fluid tracking from the skin ulcer towards the medial calcaneus   suspicious for small abscess.  3.  Osteomyelitis of the posterior calcaneus.  4.  Osteonecrosis within the posterior calcaneus.  5.  Partial tearing of the lateral Achilles insertion.  6.  Full-thickness tearing of the lateral cord of the plantar fascia with   partial tear of the central cord.

## 2017-06-28 NOTE — PROGRESS NOTE ADULT - PROBLEM SELECTOR PLAN 8
- Resolved  - S/P D5W @50  - From 150 to 143  - Patient encouraged to drink more water.  - monitor NA levels

## 2017-06-28 NOTE — PROGRESS NOTE ADULT - PROBLEM SELECTOR PLAN 2
- inguinal wound culture shows mixed GNR, EN spp and corynebacterium spp  - on vancomycin 500 mg IV q12h and zosyn 3.375 gm IV q8h # 11  - S/P right groin wound debridement with wound vac in place  - S/P AKA POD#1  - Stump will be checked by Dr. Clement POD#3/4  - PT, OOB, Will need long term ABX

## 2017-06-28 NOTE — PROGRESS NOTE ADULT - SUBJECTIVE AND OBJECTIVE BOX
stable overnight    pain controlled on dilaudid    MEDICATIONS  (STANDING):  aspirin  chewable 81 milliGRAM(s) Oral daily  heparin  Injectable 5000 Unit(s) SubCutaneous every 8 hours  sodium chloride 0.9%. 1000 milliLiter(s) (75 mL/Hr) IV Continuous <Continuous>  allopurinol 100 milliGRAM(s) Oral daily  doxazosin 8 milliGRAM(s) Oral at bedtime  fenofibrate Tablet 145 milliGRAM(s) Oral daily  ferrous    sulfate 325 milliGRAM(s) Oral two times a day with meals  gabapentin 300 milliGRAM(s) Oral daily  hydrALAZINE 50 milliGRAM(s) Oral daily  HYDROmorphone   Tablet 2 milliGRAM(s) Oral every 6 hours  isosorbide   mononitrate ER Tablet (IMDUR) 120 milliGRAM(s) Oral daily  pramipexole 0.125 milliGRAM(s) Oral daily  senna 2 Tablet(s) Oral at bedtime  simvastatin 10 milliGRAM(s) Oral at bedtime  piperacillin/tazobactam IVPB. 3.375 Gram(s) IV Intermittent every 8 hours  ammonium lactate 12% Lotion 1 Application(s) Topical daily  buDESOnide   0.5 milliGRAM(s) Respule 0.5 milliGRAM(s) Inhalation two times a day  metoprolol 25 milliGRAM(s) Oral two times a day    MEDICATIONS  (PRN):  HYDROmorphone  Injectable 1 milliGRAM(s) IV Push every 3 hours PRN Moderate Pain (4 - 6)  acetaminophen   Tablet. 500 milliGRAM(s) Oral every 6 hours PRN Mild Pain (1 - 3)  ALBUTerol    90 MICROgram(s) HFA Inhaler 2 Puff(s) Inhalation every 6 hours PRN Shortness of Breath  HYDROmorphone  Injectable 0.5 milliGRAM(s) IV Push every 6 hours PRN Severe Pain (7 - 10)      Allergies    No Known Allergies    Intolerances        Flatus: [ ] YES [ ] NO             Bowel Movement: [ ] YES [ ] NO  Pain (0-10):            Pain Control Adequate: [ ] YES [ ] NO  Nausea: [ ] YES [ ] NO            Vomiting: [ ] YES [ ] NO  Diarrhea: [ ] YES [ ] NO         Constipation: [ ] YES [ ] NO     Chest Pain: [ ] YES [ ] NO    SOB:  [ ] YES [ ] NO    Vital Signs Last 24 Hrs  T(C): 36.1 (28 Jun 2017 06:16), Max: 37.4 (27 Jun 2017 18:55)  T(F): 97 (28 Jun 2017 06:16), Max: 99.4 (27 Jun 2017 18:55)  HR: 84 (28 Jun 2017 06:00) (60 - 88)  BP: 149/50 (28 Jun 2017 04:00) (120/45 - 161/48)  BP(mean): 76 (28 Jun 2017 04:00) (67 - 76)  RR: 19 (28 Jun 2017 06:00) (15 - 20)  SpO2: 94% (28 Jun 2017 06:00) (91% - 100%)    I&O's Summary    27 Jun 2017 07:01  -  28 Jun 2017 07:00  --------------------------------------------------------  IN: 1725 mL / OUT: 435 mL / NET: 1290 mL        Physical Exam:  General: NAD, resting comfortably  Pulmonary: normal resp effort, CTA-B  Cardiovascular: NSR  Abdominal: soft, NT/ND  Extremities: WWP, normal strength  Neuro: A/O x 3, CNs II-XII grossly intact, normal motor/sensation, no focal deficits  Pulses:   Right:                                                                          Left:  dressing R groin intact  LABS:                        8.7    8.9   )-----------( 254      ( 28 Jun 2017 06:04 )             27.0     06-28    150<H>  |  115<H>  |  32<H>  ----------------------------<  69<L>  3.8   |  22  |  1.40<H>    Ca    7.9<L>      28 Jun 2017 06:04      PTT - ( 27 Jun 2017 07:05 )  PTT:35.1 sec      CAPILLARY BLOOD GLUCOSE          RADIOLOGY & ADDITIONAL TESTS:

## 2017-06-28 NOTE — PROGRESS NOTE ADULT - SUBJECTIVE AND OBJECTIVE BOX
Pt has been seen and examined with FP resident, resident supervised agree with a/p       Patient is a 82y old  Male who presents with a chief complaint of Sent from [facility] rehab because of low hemoglobin 7.7 (17 Jun 2017 00:37)        HPI:  82 year old male with history of CAD s/p stents (LAD, RCA bare metal around 9/16),PVD (RLE stent/ angioplasty and surgical debridment by Dr Siu 5/20 ) A.Fib not on anticoagulation due to GI bleeding, Hypertension, COPD on home O2 2L, SHAYY on nocturnal BIPAP, ex-smoker (smoked 1ppd X 50 years, quit 22 years ago),  Chronic diastolic CHF, Hyperlipidemia, PVD, Iron deficiency anemia, Chronic back pain, Gout, BPH, CKD III with baseline Cr 2. Was ecently admitted to  on  4/17 with anemia of GI bleeding, RLE and RUE DVTs,( received pRBCs and IVC filter discharged to rehab with aspirin) was readmitted to   ER on 5/4/17 for worsening anemia with Hb 6, worsening leg pain from ulcers and worsening renal function Cr 3.99 now presents from [facility] with anemia (7.7 on labs today decreasing from 9.5 over past few weeks). Pt c/o gradual onset  progressive fatigue over the last couple of weeks. Patient is unable to ambulate because of his leg cellulitis and ulcers. He denies any shortness of breath, palpitations / chest pain / light headedness. According to the daughter the attendants at [facility] did notice dark stools for the last couple of days. Patient denies any abdominal pain or change in bowel or urinary habits.  In ED, + guaiac. (17 Jun 2017 00:37)        PHYSICAL EXAM:  Vital Signs Last 24 Hrs  T(C): 36.1 (28 Jun 2017 06:16), Max: 37.4 (27 Jun 2017 18:55)  T(F): 97 (28 Jun 2017 06:16), Max: 99.4 (27 Jun 2017 18:55)  HR: 80 (28 Jun 2017 15:07) (60 - 88)  BP: 158/39 (28 Jun 2017 15:07) (120/45 - 161/51)  BP(mean): 70 (28 Jun 2017 15:07) (67 - 80)  RR: 23 (28 Jun 2017 15:07) (15 - 23)  SpO2: 96% (28 Jun 2017 08:00) (91% - 100%)  general- comfortable   -rs-aeeb,cta  -cvs-s1s2 normal   -p/a-soft,bs+   -extremity- right leg s/p AKA, groin wound present  -cns- non focal         A/P    #Anemia-Anemia due to chronic disease or gi bleed   -stable     #fever- possibly from wound infection -groin wound currently active   -ct abx, need longer iv abx     #DVT pr-heparin    #hypernatremia- monitor it, free water intake    #ARF- iv fluids and monitoring     #discharge plan

## 2017-06-28 NOTE — PROGRESS NOTE ADULT - SUBJECTIVE AND OBJECTIVE BOX
Patient is a 82y old  Male who presents with a chief complaint of Right Leg Venous Ulcers (17 Jun 2017 07:52)      HPI:  82 year old male with history of CAD s/p stents (LAD, RCA bare metal around 9/16),PVD (RLE stent/ angioplasty and surgical debridment by Dr Siu 5/20 ) Brianna not on anticoagulation due to GI bleeding, Hypertension, COPD on home O2 2L, SHAYY on nocturnal BIPAP, ex-smoker (smoked 1ppd X 50 years, quit 22 years ago),  Chronic diastolic CHF, Hyperlipidemia, PVD, Iron deficiency anemia, Chronic back pain, Gout, BPH, CKD III with baseline Cr 2. Was ecently admitted to  on  4/17 with anemia of GI bleeding, RLE and RUE DVTs,( received pRBCs and IVC filter discharged to rehab with aspirin) was readmitted to   ER on 5/4/17 for worsening anemia with Hb 6, worsening leg pain from ulcers and worsening renal function Cr 3.99 now presents from [facility] with anemia (7.7 on labs today decreasing from 9.5 over past few weeks). Pt c/o gradual onset  progressive fatigue over the last couple of weeks. Patient is unable to ambulate because of his leg cellulitis and ulcers. He denies any shortness of breath, palpitations / chest pain / light headedness. According to the daughter the attendants at [facility] did notice dark stools for the last couple of days. Patient denies any abdominal pain or change in bowel or urinary habits.  In ED, + guaiac. (17 Jun 2017 00:37)      Patient with mild pain at amputation site. He feels that is well controlled. It is sharp and burning,  Denies any nausea vomiting abdominal pain  Daughter stated within last night  History per patient and daughters.      PAST MEDICAL & SURGICAL HISTORY:  Sepsis, due to unspecified organism: 2/2 poorly healing wounds b/l  Dyspepsia: On moderate exertion.  Sleep apnea, obstructive: Requires home 02 therapy, and treatment with BIPAP  Atelectasis  Pleural effusion, bilateral  Respiratory failure  Peripheral edema  CRI (chronic renal insufficiency)  Gout  Benign prostatic hypertrophy  Spinal stenosis  Hypercholesterolemia  GERD (gastroesophageal reflux disease)  CAD (coronary artery disease)  Hypertension  S/P angioplasty with stent  Cataract of left eye  Prostate: Surgery green light procedure.  S/P rotator cuff surgery: Right  S/P angioplasty      MEDICATIONS  (STANDING):  aspirin  chewable 81 milliGRAM(s) Oral daily  heparin  Injectable 5000 Unit(s) SubCutaneous every 8 hours  allopurinol 100 milliGRAM(s) Oral daily  doxazosin 8 milliGRAM(s) Oral at bedtime  fenofibrate Tablet 145 milliGRAM(s) Oral daily  ferrous    sulfate 325 milliGRAM(s) Oral two times a day with meals  gabapentin 300 milliGRAM(s) Oral daily  hydrALAZINE 50 milliGRAM(s) Oral daily  HYDROmorphone   Tablet 2 milliGRAM(s) Oral every 6 hours  isosorbide   mononitrate ER Tablet (IMDUR) 120 milliGRAM(s) Oral daily  pramipexole 0.125 milliGRAM(s) Oral daily  senna 2 Tablet(s) Oral at bedtime  simvastatin 10 milliGRAM(s) Oral at bedtime  piperacillin/tazobactam IVPB. 3.375 Gram(s) IV Intermittent every 8 hours  ammonium lactate 12% Lotion 1 Application(s) Topical daily  buDESOnide   0.5 milliGRAM(s) Respule 0.5 milliGRAM(s) Inhalation two times a day  metoprolol 25 milliGRAM(s) Oral two times a day  sodium chloride 0.45%. 1000 milliLiter(s) (75 mL/Hr) IV Continuous <Continuous>    MEDICATIONS  (PRN):  HYDROmorphone  Injectable 1 milliGRAM(s) IV Push every 3 hours PRN Moderate Pain (4 - 6)  acetaminophen   Tablet. 500 milliGRAM(s) Oral every 6 hours PRN Mild Pain (1 - 3)  ALBUTerol    90 MICROgram(s) HFA Inhaler 2 Puff(s) Inhalation every 6 hours PRN Shortness of Breath  HYDROmorphone  Injectable 0.5 milliGRAM(s) IV Push every 6 hours PRN Severe Pain (7 - 10)      Allergies    No Known Allergies    Intolerances        SOCIAL HISTORY:unchanged    FAMILY HISTORY:  No pertinent family history in first degree relatives      REVIEW OF SYSTEMS:    CONSTITUTIONAL: No weakness, fevers or chills  EYES/ENT: No visual changes;  No vertigo or throat pain   NECK: No pain or stiffness  RESPIRATORY: No cough, wheezing, hemoptysis; No shortness of breath  CARDIOVASCULAR: No chest pain or palpitations  GENITOURINARY: No dysuria, frequency or hematuria  NEUROLOGICAL: No numbness or weakness  SKIN: No itching, burning, rashes, or lesions   All other review of systems is negative unless indicated above.    Vital Signs Last 24 Hrs  T(C): 36.1 (28 Jun 2017 06:16), Max: 37.4 (27 Jun 2017 18:55)  T(F): 97 (28 Jun 2017 06:16), Max: 99.4 (27 Jun 2017 18:55)  HR: 84 (28 Jun 2017 06:00) (60 - 88)  BP: 149/50 (28 Jun 2017 04:00) (120/45 - 161/48)  BP(mean): 76 (28 Jun 2017 04:00) (67 - 76)  RR: 19 (28 Jun 2017 06:00) (15 - 20)  SpO2: 94% (28 Jun 2017 06:00) (91% - 100%)    PHYSICAL EXAM:    Constitutional: NAD, well-developed  HEENT: EOMI, throat clear  Neck: No LAD, supple  Respiratory: CTA and P  Cardiovascular: S1 and S2, RRR, no M  Gastrointestinal: BS+, soft, NT/ND, neg HSM,  Extremities: No peripheral edema, neg clubing, cyanosis  S/P amputation  Neurological: A/O x 3, no focal deficits  Psychiatric: Normal mood, normal affect  Skin: No rashes    LABS:  CBC Full  -  ( 28 Jun 2017 06:04 )  WBC Count : 8.9 K/uL  Hemoglobin : 8.7 g/dL  Hematocrit : 27.0 %  Platelet Count - Automated : 254 K/uL  Mean Cell Volume : 91.5 fl  Mean Cell Hemoglobin : 29.5 pg  Mean Cell Hemoglobin Concentration : 32.3 gm/dL  Auto Neutrophil # : 7.4 K/uL  Auto Lymphocyte # : 0.4 K/uL  Auto Monocyte # : 0.6 K/uL  Auto Eosinophil # : 0.3 K/uL  Auto Basophil # : 0.1 K/uL  Auto Neutrophil % : 83.5 %  Auto Lymphocyte % : 4.9 %  Auto Monocyte % : 6.6 %  Auto Eosinophil % : 3.8 %  Auto Basophil % : 1.2 %    06-28    150<H>  |  115<H>  |  32<H>  ----------------------------<  69<L>  3.8   |  22  |  1.40<H>    Ca    7.9<L>      28 Jun 2017 06:04      PTT - ( 27 Jun 2017 07:05 )  PTT:35.1 sec        RADIOLOGY & ADDITIONAL STUDIES:

## 2017-06-28 NOTE — PROGRESS NOTE ADULT - ASSESSMENT
Anemia–likely due to anemia of chronic disease and contributing factor possibly from GI source as well.  Would follow serial H&H's.    His hematocrit appears to be stable in the hospital. I had discussion with the patient and his daughter. For now, we will hold off on endoscopy as he is at high risk. However, if there is plan for vascular intervention and increasing  antiplatelet agents, we may want cross the bridge of endoscopic evaluation again.  restarted iron and vida      CAD: ASA restarted at low dose  DW pt again and daughter  s/p amputation and DW Dr Henry  DW pt  risk MI vs risk bleed and cont ASA  family agrees        Would clinically follow and ascertain for evidence of significant bleeding.     Would continue proton pump inhibitor twice a day for now.

## 2017-06-28 NOTE — PROGRESS NOTE ADULT - ASSESSMENT
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 He has gradual onset  progressive fatigue over the last couple of weeks. Patient is unable to ambulate because of his leg ulcers. In ER, he was started on vancomycin IV and zosyn.    1. Right inguinal open wound with probable infection with mixed GNR, corynebacterium spp and EN spp.  Right leg AKA  -inguinal wound culture shows mixed GNR, EN spp and corynebacterium spp  -on vancomycin 500 mg IV q12h and zosyn 3.375 gm IV q8h # 11  -tolerating abx well so far; no side effects noted  -renal function is slightly worse; vancomycin was stopped after surgery; will reorder vancomycin at 500 mg IV q12h  -will obtain vancomycin trough level in AM  -local wound care per surgery  -continue IV abx coverage   -d/w Dr. Clement: the patient will need longer term IV abx coverage  -monitor temps  -f/u CBC  -supportive care  2. Other issues: CAD s/p stents, PVD, A.Paul not on anticoagulation due to GI bleeding, Hypertension, COPD, SHAYY on nocturnal BIPAP, Chronic diastolic CHF, Hyperlipidemia, PVD, Iron deficiency anemia, Chronic back pain, Gout, BPH, CKD III   -care per medicine

## 2017-06-29 LAB
ANION GAP SERPL CALC-SCNC: 7 MMOL/L — SIGNIFICANT CHANGE UP (ref 5–17)
BUN SERPL-MCNC: 33 MG/DL — HIGH (ref 7–23)
CALCIUM SERPL-MCNC: 7.7 MG/DL — LOW (ref 8.5–10.1)
CHLORIDE SERPL-SCNC: 113 MMOL/L — HIGH (ref 96–108)
CO2 SERPL-SCNC: 26 MMOL/L — SIGNIFICANT CHANGE UP (ref 22–31)
CREAT SERPL-MCNC: 1.58 MG/DL — HIGH (ref 0.5–1.3)
GLUCOSE SERPL-MCNC: 131 MG/DL — HIGH (ref 70–99)
HCT VFR BLD CALC: 25.3 % — LOW (ref 39–50)
HGB BLD-MCNC: 8.2 G/DL — LOW (ref 13–17)
MCHC RBC-ENTMCNC: 29.1 PG — SIGNIFICANT CHANGE UP (ref 27–34)
MCHC RBC-ENTMCNC: 32.5 GM/DL — SIGNIFICANT CHANGE UP (ref 32–36)
MCV RBC AUTO: 89.5 FL — SIGNIFICANT CHANGE UP (ref 80–100)
PLATELET # BLD AUTO: 223 K/UL — SIGNIFICANT CHANGE UP (ref 150–400)
POTASSIUM SERPL-MCNC: 3.8 MMOL/L — SIGNIFICANT CHANGE UP (ref 3.5–5.3)
POTASSIUM SERPL-SCNC: 3.8 MMOL/L — SIGNIFICANT CHANGE UP (ref 3.5–5.3)
RBC # BLD: 2.83 M/UL — LOW (ref 4.2–5.8)
RBC # FLD: 16.2 % — HIGH (ref 10.3–14.5)
SODIUM SERPL-SCNC: 146 MMOL/L — HIGH (ref 135–145)
VANCOMYCIN TROUGH SERPL-MCNC: 20.1 UG/ML — HIGH (ref 10–20)
WBC # BLD: 6.4 K/UL — SIGNIFICANT CHANGE UP (ref 3.8–10.5)
WBC # FLD AUTO: 6.4 K/UL — SIGNIFICANT CHANGE UP (ref 3.8–10.5)

## 2017-06-29 PROCEDURE — 71010: CPT | Mod: 26,76

## 2017-06-29 RX ORDER — SODIUM CHLORIDE 9 MG/ML
1000 INJECTION, SOLUTION INTRAVENOUS
Qty: 0 | Refills: 0 | Status: DISCONTINUED | OUTPATIENT
Start: 2017-06-29 | End: 2017-06-30

## 2017-06-29 RX ADMIN — GABAPENTIN 300 MILLIGRAM(S): 400 CAPSULE ORAL at 13:19

## 2017-06-29 RX ADMIN — HYDROMORPHONE HYDROCHLORIDE 2 MILLIGRAM(S): 2 INJECTION INTRAMUSCULAR; INTRAVENOUS; SUBCUTANEOUS at 13:20

## 2017-06-29 RX ADMIN — ISOSORBIDE MONONITRATE 120 MILLIGRAM(S): 60 TABLET, EXTENDED RELEASE ORAL at 13:22

## 2017-06-29 RX ADMIN — PIPERACILLIN AND TAZOBACTAM 25 GRAM(S): 4; .5 INJECTION, POWDER, LYOPHILIZED, FOR SOLUTION INTRAVENOUS at 17:31

## 2017-06-29 RX ADMIN — Medication 0.5 MILLIGRAM(S): at 19:41

## 2017-06-29 RX ADMIN — Medication 325 MILLIGRAM(S): at 18:27

## 2017-06-29 RX ADMIN — HEPARIN SODIUM 5000 UNIT(S): 5000 INJECTION INTRAVENOUS; SUBCUTANEOUS at 21:34

## 2017-06-29 RX ADMIN — Medication 81 MILLIGRAM(S): at 13:19

## 2017-06-29 RX ADMIN — HYDROMORPHONE HYDROCHLORIDE 2 MILLIGRAM(S): 2 INJECTION INTRAMUSCULAR; INTRAVENOUS; SUBCUTANEOUS at 05:48

## 2017-06-29 RX ADMIN — Medication 8 MILLIGRAM(S): at 21:35

## 2017-06-29 RX ADMIN — Medication 25 MILLIGRAM(S): at 18:27

## 2017-06-29 RX ADMIN — HEPARIN SODIUM 5000 UNIT(S): 5000 INJECTION INTRAVENOUS; SUBCUTANEOUS at 17:30

## 2017-06-29 RX ADMIN — Medication 100 MILLIGRAM(S): at 13:21

## 2017-06-29 RX ADMIN — Medication 100 MILLIGRAM(S): at 05:48

## 2017-06-29 RX ADMIN — SENNA PLUS 2 TABLET(S): 8.6 TABLET ORAL at 21:35

## 2017-06-29 RX ADMIN — HYDROMORPHONE HYDROCHLORIDE 2 MILLIGRAM(S): 2 INJECTION INTRAMUSCULAR; INTRAVENOUS; SUBCUTANEOUS at 14:20

## 2017-06-29 RX ADMIN — PRAMIPEXOLE DIHYDROCHLORIDE 0.12 MILLIGRAM(S): 0.12 TABLET ORAL at 13:19

## 2017-06-29 RX ADMIN — Medication 50 MILLIGRAM(S): at 05:55

## 2017-06-29 RX ADMIN — SIMVASTATIN 10 MILLIGRAM(S): 20 TABLET, FILM COATED ORAL at 21:35

## 2017-06-29 RX ADMIN — HYDROMORPHONE HYDROCHLORIDE 2 MILLIGRAM(S): 2 INJECTION INTRAMUSCULAR; INTRAVENOUS; SUBCUTANEOUS at 19:34

## 2017-06-29 RX ADMIN — Medication 145 MILLIGRAM(S): at 13:23

## 2017-06-29 RX ADMIN — Medication 100 MILLIGRAM(S): at 18:27

## 2017-06-29 RX ADMIN — HYDROMORPHONE HYDROCHLORIDE 2 MILLIGRAM(S): 2 INJECTION INTRAMUSCULAR; INTRAVENOUS; SUBCUTANEOUS at 06:00

## 2017-06-29 RX ADMIN — HYDROMORPHONE HYDROCHLORIDE 2 MILLIGRAM(S): 2 INJECTION INTRAMUSCULAR; INTRAVENOUS; SUBCUTANEOUS at 18:29

## 2017-06-29 RX ADMIN — HYDROMORPHONE HYDROCHLORIDE 2 MILLIGRAM(S): 2 INJECTION INTRAMUSCULAR; INTRAVENOUS; SUBCUTANEOUS at 00:55

## 2017-06-29 RX ADMIN — Medication 1 APPLICATION(S): at 13:23

## 2017-06-29 RX ADMIN — Medication 0.5 MILLIGRAM(S): at 09:17

## 2017-06-29 RX ADMIN — Medication 325 MILLIGRAM(S): at 08:12

## 2017-06-29 RX ADMIN — PIPERACILLIN AND TAZOBACTAM 25 GRAM(S): 4; .5 INJECTION, POWDER, LYOPHILIZED, FOR SOLUTION INTRAVENOUS at 05:48

## 2017-06-29 RX ADMIN — Medication 25 MILLIGRAM(S): at 05:49

## 2017-06-29 RX ADMIN — HEPARIN SODIUM 5000 UNIT(S): 5000 INJECTION INTRAVENOUS; SUBCUTANEOUS at 05:49

## 2017-06-29 NOTE — PROGRESS NOTE ADULT - ASSESSMENT
82 year old male patient s/p AKA RLE seen and evaluated for:      1. Pre-ulcerative lesion Left plantar heel; No clinical signs of infection noted  2. Other issues PVD; s/p AKA      P:  Pt was seen and examined. Plan was discussed with attending Dr. Fermin.  Cavillon and allevyn pad applied to the left postero plantar heel. Z-flex boot reapplied.  No acute intervention fat this time  Continue use of Z-flex boots while in bed  Recommend IV abx per ID, appreciated.  Care per Medicine and Vascular surgery appreciated.  Podiatry to follow.

## 2017-06-29 NOTE — PROGRESS NOTE ADULT - SUBJECTIVE AND OBJECTIVE BOX
82 year old male patient s/p Right AKA seen at seen bedside for f/u pre-ulcerative lesion of the left heel. Pt's daughters are bedside. Pt denies left foot/ankle pain. Pt denies N/V/F/C/SOB/CP/Diarrhea/headaches.    Allergies: NKFDA    MEDICATIONS  (STANDING):  aspirin  chewable 81 milliGRAM(s) Oral daily  heparin  Injectable 5000 Unit(s) SubCutaneous every 8 hours  allopurinol 100 milliGRAM(s) Oral daily  doxazosin 8 milliGRAM(s) Oral at bedtime  fenofibrate Tablet 145 milliGRAM(s) Oral daily  ferrous    sulfate 325 milliGRAM(s) Oral two times a day with meals  gabapentin 300 milliGRAM(s) Oral daily  hydrALAZINE 50 milliGRAM(s) Oral daily  HYDROmorphone   Tablet 2 milliGRAM(s) Oral every 6 hours  isosorbide   mononitrate ER Tablet (IMDUR) 120 milliGRAM(s) Oral daily  pramipexole 0.125 milliGRAM(s) Oral daily  senna 2 Tablet(s) Oral at bedtime  simvastatin 10 milliGRAM(s) Oral at bedtime  piperacillin/tazobactam IVPB. 3.375 Gram(s) IV Intermittent every 8 hours  ammonium lactate 12% Lotion 1 Application(s) Topical daily  buDESOnide   0.5 milliGRAM(s) Respule 0.5 milliGRAM(s) Inhalation two times a day  metoprolol 25 milliGRAM(s) Oral two times a day  vancomycin  IVPB 500 milliGRAM(s) IV Intermittent every 12 hours    MEDICATIONS  (PRN):  HYDROmorphone  Injectable 1 milliGRAM(s) IV Push every 3 hours PRN Moderate Pain (4 - 6)  acetaminophen   Tablet. 500 milliGRAM(s) Oral every 6 hours PRN Mild Pain (1 - 3)  ALBUTerol    90 MICROgram(s) HFA Inhaler 2 Puff(s) Inhalation every 6 hours PRN Shortness of Breath  HYDROmorphone  Injectable 0.5 milliGRAM(s) IV Push every 6 hours PRN Severe Pain (7 - 10)      Vital Signs Last 24 Hrs  T(C): 36.1 (29 Jun 2017 06:20), Max: 36.7 (28 Jun 2017 17:56)  T(F): 97 (29 Jun 2017 06:20), Max: 98 (28 Jun 2017 17:56)  HR: 63 (29 Jun 2017 10:10) (60 - 83)  BP: 136/35 (29 Jun 2017 10:00) (136/35 - 162/46)  BP(mean): 61 (29 Jun 2017 10:00) (61 - 78)  RR: 15 (29 Jun 2017 10:00) (13 - 23)  SpO2: 97% (29 Jun 2017 10:10) (89% - 100%)        PHYSICAL EXAM:  Constitutional: NAD, awake and alert, well-developed  Extremities: Right LE AKA  Vascular: Weakly palpable DP and PT pulses of the left foot.  Neuro: Protective sensation is decreased.  Derm: Dressing noted to be clean, dry and intact on the LLE. Preulcerative lesion noted on the postero plantar left heel. No fluctuance noted of the left foot. No active drainage noted. No probe to bone. No malodor. Left hallux toenail are thickened and dystrophic. No acute signs of infection noted.  MSK: No pain upon palpation of the left lower leg and foot.       LABS:              (06-29 @ 08:53)                      8.2  6.4 )-----------( 223                 25.3      06-29    146<H>  |  113<H>  |  33<H>  ----------------------------<  131<H>  3.8   |  26  |  1.58<H>    Ca    7.7<L>      29 Jun 2017 08:53          Culture - Blood (06.17.17 @ 03:54)    Specimen Source: .Blood Blood    Culture Results:   No growth at 5 days.      RADIOLOGY:      PROCEDURE DATE:  06/18/2017    INTERPRETATION:  LWR EXT (MRI) RT W O CON dated 6/18/2017 11:50 AM     FINDINGS: Evaluation of the soft tissues demonstrates a large posterior   skin ulcer measuring approximately 4.5 x 5.2 cm. Deep to this skin ulcer,   there is nonspecific soft tissue swelling and edema. There is tracking   edema from the ulcer to the proximal calcaneus extending towards the   medial aspect of the calcaneus suspicious for small abscess that measures   1.2 x 2.0 x 3.2 cm (series 10, image 13 and series 8, image 10). There is   partial tearing of the lateral aspect of the Achilles tendon which   closely approximates the skin ulcer and may be exposed. There is partial   tearing of the central plantar fascial cord and full-thickness tear of   the lateral plantar fascial cord at its origin on the calcaneus. The   remaining tendons are preserved. There is no tenosynovitis appreciated.  Evaluation of the bone marrow signal demonstrates erosive change   involving the lateral calcaneus. This erosion measures 1.1 cm. There is   abnormal decreased T1 signal with edema in the posterior aspect of the   calcaneus adjacent to the origin compatible with osteomyelitis. There is   a curvilinear focus of decreased T1 signal that measures up to 1.8 cm   (series 7, image six) within the posterior calcaneus suspicious for an   area of osteonecrosis. Patchy appearance of the bone marrow signal   throughout the foot as can be seen in the setting of decreased bone   mineralization. The tibiotalar, posterior subtalar, middle subtalar,   calcaneocuboid, and talonavicular joints are intact. There is relative   preservation of the midfoot joint spaces. No fracture is identified.  There is no soft tissue mass. The neurovascular structures are relatively   preserved.      IMPRESSION:   1.  Large skin ulcer at the posterior aspect of the heel.  2.  Fluid tracking from the skin ulcer towards the medial calcaneus   suspicious for small abscess.  3.  Osteomyelitis of the posterior calcaneus.  4.  Osteonecrosis within the posterior calcaneus.  5.  Partial tearing of the lateral Achilles insertion.  6.  Full-thickness tearing of the lateral cord of the plantar fascia with   partial tear of the central cord.

## 2017-06-29 NOTE — PROGRESS NOTE ADULT - SUBJECTIVE AND OBJECTIVE BOX
Chief Complaint/Reason for Admission: Sent from [facility] rehab because of low hemoglobin 7.7	  History of Present Illness: 	  82 year old male with history of CAD s/p stents (LAD, RCA bare metal around 9/16),PVD (RLE stent/ angioplasty and surgical debridment by Dr Siu 5/20 ) Brianna not on anticoagulation due to GI bleeding, Hypertension, COPD on home O2 2L, SHAYY on nocturnal BIPAP, ex-smoker (smoked 1ppd X 50 years, quit 22 years ago),  Chronic diastolic CHF, Hyperlipidemia, PVD, Iron deficiency anemia, Chronic back pain, Gout, BPH, CKD III with baseline Cr 2. Was ecently admitted to  on  4/17 with anemia of GI bleeding, RLE and RUE DVTs,( received pRBCs and IVC filter discharged to rehab with aspirin) was readmitted to   ER on 5/4/17 for worsening anemia with Hb 6, worsening leg pain from ulcers and worsening renal function Cr 3.99 now presents from [facility] with anemia (7.7 on labs today decreasing from 9.5 over past few weeks). Pt c/o gradual onset  progressive fatigue over the last couple of weeks. Patient is unable to ambulate because of his leg cellulitis and ulcers. He denies any shortness of breath, palpitations / chest pain / light headedness. According to the daughter the attendants at [facility] did notice dark stools for the last couple of days. Patient denies any abdominal pain or change in bowel or urinary habits.  In ED, + guaiac.     6/19 - Patient seen and examined. Reports 6 loose BM since this morning.  Patient has been placed on isolation until Cdiff has been rulled out.  Afebrile. hemodynamically stable.     6/20 - Patient going for muscle flap of groin area tonight after 1u PRBCs for anem.  Hemodynamically stable. Patient had low grade temp overnight.  On Vanco/Zosyn #3.  Tolerating abx well.  No Diarrhea, no rash.     6/21 - Patient seen and examined with daughter at bedside eating breakfast.  S/P right femoral wound debridement Day #1.  Wound vac in place and functional.  patient is feeling well with no complaints at this time. Has elevated BP, Will increased dose of Metoprolol to BID.  Podiatry consult placed to evaluate right achilles tendon rupture repair. Hemodynamically stable, afebrile. On Vanco/Zosyn day #4.    6/22 - Patient seen and examined.  Hemodynamically stable. NAD, Afebrile. On Vanco.Zosyn Day #5.  patient is feeling well.  Has been seen and Dr. Cardenas and may require further debridement and or AKA.  Patient is in pain despite being on Dilaudid standing q6hr. Will add Dilaudid for breakthrough pain.     6/23 - Patient seen and examined with daughter at bedside.  Had a long conversation with patient and daughter about long term goals of care.  Wound debridement vs AKA, Prosthetics, Physical therapy.  Patient is very deconditioned from chronic right LE wounds.  Patient would most likely benifit from AKA in the long term.  Hemodynamically stable.  afebrile. No overnight events.  Tolerating abx well.     6/24 - Patient seen and examined with daughter at bedside.  Discussed AKA with patient and daughter.  Patient is leaning towards having the amputation done but will decide in the next few days.  Hemodynamically stable, pain controlled, afebrile.  No overnight events.     6/26 - Patient seen and examined with daughter at bedside.  Patient had discussed AKA with Dr. Clement and has decided on having procedure done.  Time of surgery not yet determined. Hemodynamically stable, afebrile.  Patient is feeling well, tolerating PO and seen by PT.      6/27 - Patient seen and examined with family at bedside.  Family has arrived for moral support since patient is to undergo AKA today.  He is in good spirits and is in agreement with plan.  Family is very supportive.  Patient is hemodynamically stable, afebrile.  No overnight events.      6/28 - POD#1 S/P R AKA.  Patient seen and examined with daughter at bedside.  No overnight events.  AKA successful, patient tolerated procedure well with no post op complications.  Patient received 1unit PRBCs during procedure.  In good spirits.  Pain well controlled with Dilaudid. Hemodynamically stable, afebrile.     6/29 - POD#2 S/P R AKA.  Patient seen and examined. In good spirits.  Pain well controlled.  Patient will need to be on 4 weeks of IV abx for right groin wound.  Wound vac dressing to be changed on sunday 7/2.  Hemodynamically stable. Afebrile.     MEDICATIONS  (STANDING):  aspirin  chewable 81 milliGRAM(s) Oral daily  heparin  Injectable 5000 Unit(s) SubCutaneous every 8 hours  allopurinol 100 milliGRAM(s) Oral daily  doxazosin 8 milliGRAM(s) Oral at bedtime  fenofibrate Tablet 145 milliGRAM(s) Oral daily  ferrous    sulfate 325 milliGRAM(s) Oral two times a day with meals  gabapentin 300 milliGRAM(s) Oral daily  hydrALAZINE 50 milliGRAM(s) Oral daily  HYDROmorphone   Tablet 2 milliGRAM(s) Oral every 6 hours  isosorbide   mononitrate ER Tablet (IMDUR) 120 milliGRAM(s) Oral daily  pramipexole 0.125 milliGRAM(s) Oral daily  senna 2 Tablet(s) Oral at bedtime  simvastatin 10 milliGRAM(s) Oral at bedtime  piperacillin/tazobactam IVPB. 3.375 Gram(s) IV Intermittent every 8 hours  ammonium lactate 12% Lotion 1 Application(s) Topical daily  buDESOnide   0.5 milliGRAM(s) Respule 0.5 milliGRAM(s) Inhalation two times a day  metoprolol 25 milliGRAM(s) Oral two times a day  vancomycin  IVPB 500 milliGRAM(s) IV Intermittent every 12 hours    MEDICATIONS  (PRN):  HYDROmorphone  Injectable 1 milliGRAM(s) IV Push every 3 hours PRN Moderate Pain (4 - 6)  acetaminophen   Tablet. 500 milliGRAM(s) Oral every 6 hours PRN Mild Pain (1 - 3)  ALBUTerol    90 MICROgram(s) HFA Inhaler 2 Puff(s) Inhalation every 6 hours PRN Shortness of Breath  HYDROmorphone  Injectable 0.5 milliGRAM(s) IV Push every 6 hours PRN Severe Pain (7 - 10)    ICU Vital Signs Last 24 Hrs  T(C): 36.1 (29 Jun 2017 06:20), Max: 36.7 (28 Jun 2017 17:56)  T(F): 97 (29 Jun 2017 06:20), Max: 98 (28 Jun 2017 17:56)  HR: 72 (29 Jun 2017 12:00) (60 - 83)  BP: 137/45 (29 Jun 2017 12:00) (136/35 - 162/46)  BP(mean): 63 (29 Jun 2017 11:00) (61 - 78)  ABP: --  ABP(mean): --  RR: 19 (29 Jun 2017 12:00) (13 - 23)  SpO2: 97% (29 Jun 2017 10:10) (89% - 100%)    GEN: NAD, comfortable, resting in bed  CV: S1S2, RRR, no mumur  RESP: good air movement, CTABL, no rales, rhonchi or wheezing  ABD: +BS, soft, ND, NT, no guarding, no rigidity  Skin: R groin wound vac in place and functional; Dressing in place s/p AKA  EXT: +2 radial and pedial pulses, no edema, no calve tenderness                                    8.2    6.4   )-----------( 223      ( 29 Jun 2017 08:53 )             25.3     29 Jun 2017 08:53    146    |  113    |  33     ----------------------------<  131    3.8     |  26     |  1.58     Ca    7.7        29 Jun 2017 08:53      EXAM:  LWR EXT (MRI) RT W O CON                        PROCEDURE DATE:  06/18/2017    IMPRESSION:   1.  Large skin ulcer at the posterior aspect of the heel.  2.  Fluid tracking from the skin ulcer towards the medial calcaneus   suspicious for small abscess.  3.  Osteomyelitis of the posterior calcaneus.  4.  Osteonecrosis within the posterior calcaneus.  5.  Partial tearing of the lateral Achilles insertion.  6.  Full-thickness tearing of the lateral cord of the plantar fascia with   partial tear of the central cord.

## 2017-06-29 NOTE — PROGRESS NOTE ADULT - ASSESSMENT
clinically better    PT; PICC line to be placed for prolonged antibiotics for protection of R groin arterial patch

## 2017-06-29 NOTE — PROGRESS NOTE ADULT - SUBJECTIVE AND OBJECTIVE BOX
MEDICATIONS  (STANDING):  aspirin  chewable 81 milliGRAM(s) Oral daily  heparin  Injectable 5000 Unit(s) SubCutaneous every 8 hours  allopurinol 100 milliGRAM(s) Oral daily  doxazosin 8 milliGRAM(s) Oral at bedtime  fenofibrate Tablet 145 milliGRAM(s) Oral daily  ferrous    sulfate 325 milliGRAM(s) Oral two times a day with meals  gabapentin 300 milliGRAM(s) Oral daily  hydrALAZINE 50 milliGRAM(s) Oral daily  HYDROmorphone   Tablet 2 milliGRAM(s) Oral every 6 hours  isosorbide   mononitrate ER Tablet (IMDUR) 120 milliGRAM(s) Oral daily  pramipexole 0.125 milliGRAM(s) Oral daily  senna 2 Tablet(s) Oral at bedtime  simvastatin 10 milliGRAM(s) Oral at bedtime  piperacillin/tazobactam IVPB. 3.375 Gram(s) IV Intermittent every 8 hours  ammonium lactate 12% Lotion 1 Application(s) Topical daily  buDESOnide   0.5 milliGRAM(s) Respule 0.5 milliGRAM(s) Inhalation two times a day  metoprolol 25 milliGRAM(s) Oral two times a day  vancomycin  IVPB 500 milliGRAM(s) IV Intermittent every 12 hours    MEDICATIONS  (PRN):  HYDROmorphone  Injectable 1 milliGRAM(s) IV Push every 3 hours PRN Moderate Pain (4 - 6)  acetaminophen   Tablet. 500 milliGRAM(s) Oral every 6 hours PRN Mild Pain (1 - 3)  ALBUTerol    90 MICROgram(s) HFA Inhaler 2 Puff(s) Inhalation every 6 hours PRN Shortness of Breath  HYDROmorphone  Injectable 0.5 milliGRAM(s) IV Push every 6 hours PRN Severe Pain (7 - 10)      Allergies    No Known Allergies    Intolerances        Flatus: [ ] YES [ ] NO             Bowel Movement: [ ] YES [ ] NO  Pain (0-10):            Pain Control Adequate: [ ] YES [ ] NO  Nausea: [ ] YES [ ] NO            Vomiting: [ ] YES [ ] NO  Diarrhea: [ ] YES [ ] NO         Constipation: [ ] YES [ ] NO     Chest Pain: [ ] YES [ ] NO    SOB:  [ ] YES [ ] NO    Vital Signs Last 24 Hrs  T(C): 36.1 (29 Jun 2017 06:20), Max: 36.7 (28 Jun 2017 17:56)  T(F): 97 (29 Jun 2017 06:20), Max: 98 (28 Jun 2017 17:56)  HR: 72 (29 Jun 2017 12:00) (60 - 83)  BP: 137/45 (29 Jun 2017 12:00) (136/35 - 162/46)  BP(mean): 63 (29 Jun 2017 11:00) (61 - 78)  RR: 19 (29 Jun 2017 12:00) (13 - 23)  SpO2: 97% (29 Jun 2017 10:10) (89% - 100%)    I&O's Summary    28 Jun 2017 07:01  -  29 Jun 2017 07:00  --------------------------------------------------------  IN: 300 mL / OUT: 1175 mL / NET: -875 mL    29 Jun 2017 07:01  -  29 Jun 2017 14:14  --------------------------------------------------------  IN: 0 mL / OUT: 200 mL / NET: -200 mL      pain much better, VAC in place in R groin  Physical Exam:  General: NAD, resting comfortably  Pulmonary: normal resp effort, CTA-B  Cardiovascular: NSR  Abdominal: soft, NT/ND  Extremities: WWP, normal strength  Neuro: A/O x 3, CNs II-XII grossly intact, normal motor/sensation, no focal deficits  Pulses:   Right:                                                                          Left:  FEM [ ]2+ [ ]1+ [ ]doppler                                             FEM [ ]2+ [ ]1+ [ ]doppler    POP [ ]2+ [ ]1+ [ ]doppler                                             POP [ ]2+ [ ]1+ [ ]doppler    DP [ ]2+ [ ]1+ [ ]doppler                                                DP [ ]2+ [ ]1+ [ ]doppler  PT[ ]2+ [ ]1+ [ ]doppler                                                  PT [ ]2+ [ ]1+ [ ]doppler    LABS:                        8.2    6.4   )-----------( 223      ( 29 Jun 2017 08:53 )             25.3     06-29    146<H>  |  113<H>  |  33<H>  ----------------------------<  131<H>  3.8   |  26  |  1.58<H>    Ca    7.7<L>      29 Jun 2017 08:53            CAPILLARY BLOOD GLUCOSE          RADIOLOGY & ADDITIONAL TESTS:

## 2017-06-29 NOTE — PROGRESS NOTE ADULT - SUBJECTIVE AND OBJECTIVE BOX
Pt has been seen and examined with FP resident, resident supervised agree with a/p       Patient is a 82y old  Male who presents with a chief complaint of Sent from [facility] rehab because of low hemoglobin 7.7 (17 Jun 2017 00:37)        HPI:  82 year old male with history of CAD s/p stents (LAD, RCA bare metal around 9/16),PVD (RLE stent/ angioplasty and surgical debridment by Dr Siu 5/20 ) A.Fib not on anticoagulation due to GI bleeding, Hypertension, COPD on home O2 2L, SHAYY on nocturnal BIPAP, ex-smoker (smoked 1ppd X 50 years, quit 22 years ago),  Chronic diastolic CHF, Hyperlipidemia, PVD, Iron deficiency anemia, Chronic back pain, Gout, BPH, CKD III with baseline Cr 2. Was ecently admitted to  on  4/17 with anemia of GI bleeding, RLE and RUE DVTs,( received pRBCs and IVC filter discharged to rehab with aspirin) was readmitted to   ER on 5/4/17 for worsening anemia with Hb 6, worsening leg pain from ulcers and worsening renal function Cr 3.99 now presents from [facility] with anemia (7.7 on labs today decreasing from 9.5 over past few weeks). Pt c/o gradual onset  progressive fatigue over the last couple of weeks. Patient is unable to ambulate because of his leg cellulitis and ulcers. He denies any shortness of breath, palpitations / chest pain / light headedness. According to the daughter the attendants at [facility] did notice dark stools for the last couple of days. Patient denies any abdominal pain or change in bowel or urinary habits.  In ED, + guaiac. (17 Jun 2017 00:37)        PHYSICAL EXAM:  Vital Signs Last 24 Hrs  T(C): 36.1 (29 Jun 2017 06:20), Max: 36.7 (28 Jun 2017 17:56)  T(F): 97 (29 Jun 2017 06:20), Max: 98 (28 Jun 2017 17:56)  HR: 72 (29 Jun 2017 12:00) (60 - 83)  BP: 137/45 (29 Jun 2017 12:00) (136/35 - 162/46)  BP(mean): 63 (29 Jun 2017 11:00) (61 - 78)  RR: 19 (29 Jun 2017 12:00) (13 - 23)  SpO2: 97% (29 Jun 2017 10:10) (89% - 100%)  general- comfortable   -rs-aeeb,cta  -cvs-s1s2 normal   -p/a-soft,bs+   -extremity- right leg s/p AKA, groin wound present  -cns- non focal         A/P    #Anemia-Anemia due to chronic disease or gi bleed   -stable     #fever- possibly from wound infection -groin wound currently active   -ct abx, need longer iv abx     #DVT pr-heparin    #hypernatremia- monitor it, free water intake    #ARF- iv fluids and monitoring     #nephrology evaluation

## 2017-06-29 NOTE — ADVANCED PRACTICE NURSE CONSULT - ASSESSMENT
This is an 82 year old male that was admitted to the hospital on 6/16/2017 for GI bleed and anemia. 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    Requested by Dr. Arreaga to place wound vac dressing to Right inguinal wound.     Patient presents resting on an [employer] 9000 MRS on his left side with right stump elevated and LLE elevated. Patient premedicated prior to dressing change. Old dressing removed. Wound measures [id number]. No odor, periwound intact. Wound bed with 100% pink tissue. Skin prep applied to periwound for protection. 1 piece of black wound vac sponge applied to wound and covered with clear drape. Sensa Trac placed and connected to wound vac. Negative pressure initiated at 100 mm/hg continuous pressure per MD order. Patient remains in bed on his left side with right stump and left heel elevated off mattress.

## 2017-06-29 NOTE — PROGRESS NOTE ADULT - SUBJECTIVE AND OBJECTIVE BOX
Patient is a 82y old  Male who presents with a chief complaint of Sent from [facility] because of low hemoglobin 7.7 (2017 00:37)    HPI:  81 y/o male with h/o CAD s/p stents (LAD, RCA bare metal around ), PVD (RLE stent/ angioplasty) complicated with poorly healing right inguinal wound s/p prior surgical debridment (by Dr Siu  ) A.Fib not on anticoagulation due to GI bleeding, Hypertension, COPD on home O2 2L, SHAYY on nocturnal BIPAP, Chronic diastolic CHF, Hyperlipidemia, PVD, Iron deficiency anemia, Chronic back pain, Gout, BPH, CKD III with baseline Cr 2, 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 He has gradual onset  progressive fatigue over the last couple of weeks. Patient is unable to ambulate because of his leg ulcers. In ER, he was started on vancomycin IV and zosyn.    s/p right leg AKA  Lying in bed in NAD  Has mild right thigh pain  Alert  Feldman in place    MEDICATIONS  (STANDING):  aspirin  chewable 81 milliGRAM(s) Oral daily  heparin  Injectable 5000 Unit(s) SubCutaneous every 8 hours  allopurinol 100 milliGRAM(s) Oral daily  doxazosin 8 milliGRAM(s) Oral at bedtime  fenofibrate Tablet 145 milliGRAM(s) Oral daily  ferrous    sulfate 325 milliGRAM(s) Oral two times a day with meals  gabapentin 300 milliGRAM(s) Oral daily  hydrALAZINE 50 milliGRAM(s) Oral daily  HYDROmorphone   Tablet 2 milliGRAM(s) Oral every 6 hours  isosorbide   mononitrate ER Tablet (IMDUR) 120 milliGRAM(s) Oral daily  pramipexole 0.125 milliGRAM(s) Oral daily  senna 2 Tablet(s) Oral at bedtime  simvastatin 10 milliGRAM(s) Oral at bedtime  piperacillin/tazobactam IVPB. 3.375 Gram(s) IV Intermittent every 8 hours  ammonium lactate 12% Lotion 1 Application(s) Topical daily  buDESOnide   0.5 milliGRAM(s) Respule 0.5 milliGRAM(s) Inhalation two times a day  metoprolol 25 milliGRAM(s) Oral two times a day  vancomycin  IVPB 500 milliGRAM(s) IV Intermittent every 12 hours    MEDICATIONS  (PRN):  HYDROmorphone  Injectable 1 milliGRAM(s) IV Push every 3 hours PRN Moderate Pain (4 - 6)  acetaminophen   Tablet. 500 milliGRAM(s) Oral every 6 hours PRN Mild Pain (1 - 3)  ALBUTerol    90 MICROgram(s) HFA Inhaler 2 Puff(s) Inhalation every 6 hours PRN Shortness of Breath  HYDROmorphone  Injectable 0.5 milliGRAM(s) IV Push every 6 hours PRN Severe Pain (7 - 10)      Vital Signs Last 24 Hrs  T(C): 36.1 (2017 06:20), Max: 36.7 (2017 17:56)  T(F): 97 (2017 06:20), Max: 98 (2017 17:56)  HR: 80 (2017 06:00) (64 - 83)  BP: 162/46 (2017 06:00) (137/41 - 162/46)  BP(mean): 78 (2017 06:00) (66 - 78)  RR: 18 (2017 06:00) (13 - 23)  SpO2: 96% (2017 06:00) (89% - 100%)    Physical Exam:        Constitutional: frail looking  HEENT: NC/AT, EOMI, PERRLA  Neck: supple  Back: no tenderness  Respiratory: decreased BS at bases  Cardiovascular: S1S2 regular, no murmurs  Abdomen: soft, not tender, not distended, positive BS  Genitourinary: right inguinal open wound packed  Rectal: deferred  Musculoskeletal: no muscle tenderness, no joint swelling or tenderness  Extremities: 1+ pedal edema; right inguinal open wound - deep; scant discharge; right AKA  Neurological: AxOx3, moving all extremities, no focal deficits  Skin: no rashes    Labs:                        8.7    8.9   )-----------( 254      ( 2017 06:04 )             27.0     -    143  |  x   |  x   ----------------------------<  x   x    |  x   |  x     Ca    7.9<L>      2017 06:04         Vancomycin Level, Trough: 20.1 ug/mL ( @ 05:22)                          8.7    8.9   )-----------( 254      ( 2017 06:04 )             27.0     06-    150<H>  |  115<H>  |  32<H>  ----------------------------<  69<L>  3.8   |  22  |  1.40<H>    Ca    7.9<L>      2017 06:04                          8.6    7.2   )-----------( 284      ( 2017 07:16 )             26.6     06-26    145  |  112<H>  |  30<H>  ----------------------------<  98  3.9   |  27  |  1.25    Ca    7.8<L>      2017 07:16                          10.2   8.0   )-----------( 366      ( 2017 06:22 )             32.7     06-21    148<H>  |  115<H>  |  29<H>  ----------------------------<  133<H>  3.8   |  23  |  1.29    Ca    7.7<L>      2017 06:22                          8.3    6.5   )-----------( 367      ( 2017 06:25 )             25.6     06-20    146<H>  |  113<H>  |  32<H>  ----------------------------<  118<H>  3.2<L>   |  27  |  1.33<H>    Ca    7.9<L>      2017 06:25         Vancomycin Level, Trough: 14.2 ug/mL ( @ 05:00)      Cultures:                       8.1    7.0   )-----------( 331      ( 2017 05:00 )             25.2     06-19    151<H>  |  116<H>  |  40<H>  ----------------------------<  97  3.6   |  27  |  1.38<H>    Ca    8.1<L>      2017 05:00         Vancomycin Level, Trough: 14.2 ug/mL ( @ 05:00)                          8.2    8.5   )-----------( 312      ( 2017 03:54 )             25.0         148<H>  |  114<H>  |  53<H>  ----------------------------<  129<H>  3.8   |  28  |  1.65<H>    Ca    8.2<L>      2017 03:54  Phos  1.9       Mg     2.1         TPro  5.2<L>  /  Alb  1.7<L>  /  TBili  0.3  /  DBili  x   /  AST  20  /  ALT  12  /  AlkPhos  57       LIVER FUNCTIONS - ( 2017 03:54 )  Alb: 1.7 g/dL / Pro: 5.2 gm/dL / ALK PHOS: 57 U/L / ALT: 12 U/L / AST: 20 U/L / GGT: x           Urinalysis Basic - ( 2017 10:36 )    Color: Yellow / Appearance: Clear / S.010 / pH: x  Gluc: x / Ketone: Negative  / Bili: Negative / Urobili: Negative mg/dL   Blood: x / Protein: 15 mg/dL / Nitrite: Negative   Culture - Other (17 @ 12:50)    Specimen Source: .Other right groin wound    Culture Results:   Moderate Mixed gram negative rods  Few Enterococcus species  Rare Corynebacterium species    Leuk Esterase: Trace / RBC: x / WBC 0-2   Sq Epi: x / Non Sq Epi: x / Bacteria: x    Culture - Blood (17 @ 03:54)    Specimen Source: .Blood Blood    Culture Results:   No growth to date.    Culture - Other (17 @ 12:50)    Specimen Source: .Other right groin wound    Culture Results:   Moderate Mixed gram negative rods  Few Enterococcus species          Radiology:    Right foot MRI:  1.  Large skin ulcer at the posterior aspect of the heel.  2.  Fluid tracking from the skin ulcer towards the medial calcaneus   suspicious for small abscess.  3.  Osteomyelitis of the posterior calcaneus.  4.  Osteonecrosis within the posterior calcaneus.  5.  Partial tearing of the lateral Achilles insertion.  6.  Full-thickness tearing of the lateral cord of the plantar fascia with   partial tear of the central cord.    Advanced directives addressed: full resuscitation

## 2017-06-29 NOTE — PROGRESS NOTE ADULT - PROBLEM SELECTOR PLAN 2
- inguinal wound culture shows mixed GNR, EN spp and corynebacterium spp  - on vancomycin 500 mg IV q12h and zosyn 3.375 gm IV q8h # 12  - S/P right groin wound debridement with wound vac in place  - S/P AKA POD#2  - Stump will be checked by Dr. Clement POD#3/4  - PT, OOB, Will need long term ABX  - Wound vac dressing to be changed on Sunday 7/2  - Patient will require 4 weeks of IV antibiotics at discharge

## 2017-06-29 NOTE — PROGRESS NOTE ADULT - ASSESSMENT
83 y/o male with h/o CAD s/p stents (LAD, RCA bare metal around 9/16), PVD (RLE stent/ angioplasty) complicated with poorly healing right inguinal wound s/p prior surgical debridment (by Dr Siu 5/20 ) Brianna not on anticoagulation due to GI bleeding, Hypertension, COPD on home O2 2L, SHAYY on nocturnal BIPAP, Chronic diastolic CHF, Hyperlipidemia, PVD, Iron deficiency anemia, Chronic back pain, Gout, BPH, CKD III with baseline Cr 2, recent 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 was admitted on 6/16 for worsening anemia with Hb 6, worsening leg pain from ulcers and worsening renal function. He has gradual onset  progressive fatigue over the last couple of weeks. Patient is unable to ambulate because of his leg ulcers. In ER, he was started on vancomycin IV and zosyn.    1. Right inguinal open wound with probable infection with mixed GNR, corynebacterium spp and EN spp.  Right leg AKA  -inguinal wound culture shows mixed GNR, EN spp and corynebacterium spp  -on vancomycin 500 mg IV q12h and zosyn 3.375 gm IV q8h # 12  -tolerating abx well so far; no side effects noted  -renal function is slightly worse; vancomycin was stopped after surgery; will reorder vancomycin at 500 mg IV q12h  -f/u vancomycin trough level  -remove hoffman  -local wound care per surgery  -continue IV abx coverage   -plan for 6 weeks of IV abx coverage  -monitor temps  -f/u CBC  -supportive care  2. Other issues: CAD s/p stents, PVD, GISELLAPaul not on anticoagulation due to GI bleeding, Hypertension, COPD, SHAYY on nocturnal BIPAP, Chronic diastolic CHF, Hyperlipidemia, PVD, Iron deficiency anemia, Chronic back pain, Gout, BPH, CKD III   -care per medicine

## 2017-06-30 LAB
ANION GAP SERPL CALC-SCNC: 8 MMOL/L — SIGNIFICANT CHANGE UP (ref 5–17)
BUN SERPL-MCNC: 35 MG/DL — HIGH (ref 7–23)
CALCIUM SERPL-MCNC: 7.8 MG/DL — LOW (ref 8.5–10.1)
CHLORIDE SERPL-SCNC: 114 MMOL/L — HIGH (ref 96–108)
CO2 SERPL-SCNC: 26 MMOL/L — SIGNIFICANT CHANGE UP (ref 22–31)
CREAT SERPL-MCNC: 1.38 MG/DL — HIGH (ref 0.5–1.3)
GLUCOSE SERPL-MCNC: 121 MG/DL — HIGH (ref 70–99)
HCT VFR BLD CALC: 24.8 % — LOW (ref 39–50)
HGB BLD-MCNC: 8 G/DL — LOW (ref 13–17)
MCHC RBC-ENTMCNC: 29.1 PG — SIGNIFICANT CHANGE UP (ref 27–34)
MCHC RBC-ENTMCNC: 32.2 GM/DL — SIGNIFICANT CHANGE UP (ref 32–36)
MCV RBC AUTO: 90.4 FL — SIGNIFICANT CHANGE UP (ref 80–100)
PLATELET # BLD AUTO: 227 K/UL — SIGNIFICANT CHANGE UP (ref 150–400)
POTASSIUM SERPL-MCNC: 3.6 MMOL/L — SIGNIFICANT CHANGE UP (ref 3.5–5.3)
POTASSIUM SERPL-SCNC: 3.6 MMOL/L — SIGNIFICANT CHANGE UP (ref 3.5–5.3)
RBC # BLD: 2.74 M/UL — LOW (ref 4.2–5.8)
RBC # FLD: 16.5 % — HIGH (ref 10.3–14.5)
SODIUM SERPL-SCNC: 146 MMOL/L — HIGH (ref 135–145)
SODIUM SERPL-SCNC: 148 MMOL/L — HIGH (ref 135–145)
WBC # BLD: 5.9 K/UL — SIGNIFICANT CHANGE UP (ref 3.8–10.5)
WBC # FLD AUTO: 5.9 K/UL — SIGNIFICANT CHANGE UP (ref 3.8–10.5)

## 2017-06-30 PROCEDURE — 77001 FLUOROGUIDE FOR VEIN DEVICE: CPT | Mod: 26

## 2017-06-30 PROCEDURE — 76937 US GUIDE VASCULAR ACCESS: CPT | Mod: 26

## 2017-06-30 PROCEDURE — 36569 INSJ PICC 5 YR+ W/O IMAGING: CPT

## 2017-06-30 RX ORDER — SODIUM CHLORIDE 9 MG/ML
1000 INJECTION, SOLUTION INTRAVENOUS
Qty: 0 | Refills: 0 | Status: DISCONTINUED | OUTPATIENT
Start: 2017-06-30 | End: 2017-06-30

## 2017-06-30 RX ORDER — AMLODIPINE BESYLATE 2.5 MG/1
5 TABLET ORAL DAILY
Qty: 0 | Refills: 0 | Status: DISCONTINUED | OUTPATIENT
Start: 2017-06-30 | End: 2017-07-08

## 2017-06-30 RX ORDER — VANCOMYCIN HCL 1 G
400 VIAL (EA) INTRAVENOUS EVERY 12 HOURS
Qty: 0 | Refills: 0 | Status: DISCONTINUED | OUTPATIENT
Start: 2017-06-30 | End: 2017-07-02

## 2017-06-30 RX ADMIN — Medication 1 APPLICATION(S): at 17:20

## 2017-06-30 RX ADMIN — PIPERACILLIN AND TAZOBACTAM 25 GRAM(S): 4; .5 INJECTION, POWDER, LYOPHILIZED, FOR SOLUTION INTRAVENOUS at 00:21

## 2017-06-30 RX ADMIN — Medication 81 MILLIGRAM(S): at 12:40

## 2017-06-30 RX ADMIN — HYDROMORPHONE HYDROCHLORIDE 2 MILLIGRAM(S): 2 INJECTION INTRAMUSCULAR; INTRAVENOUS; SUBCUTANEOUS at 12:40

## 2017-06-30 RX ADMIN — Medication 50 MILLIGRAM(S): at 06:21

## 2017-06-30 RX ADMIN — Medication 325 MILLIGRAM(S): at 17:19

## 2017-06-30 RX ADMIN — Medication 25 MILLIGRAM(S): at 17:19

## 2017-06-30 RX ADMIN — HYDROMORPHONE HYDROCHLORIDE 1 MILLIGRAM(S): 2 INJECTION INTRAMUSCULAR; INTRAVENOUS; SUBCUTANEOUS at 07:36

## 2017-06-30 RX ADMIN — HYDROMORPHONE HYDROCHLORIDE 2 MILLIGRAM(S): 2 INJECTION INTRAMUSCULAR; INTRAVENOUS; SUBCUTANEOUS at 17:00

## 2017-06-30 RX ADMIN — GABAPENTIN 300 MILLIGRAM(S): 400 CAPSULE ORAL at 12:40

## 2017-06-30 RX ADMIN — HYDROMORPHONE HYDROCHLORIDE 2 MILLIGRAM(S): 2 INJECTION INTRAMUSCULAR; INTRAVENOUS; SUBCUTANEOUS at 23:21

## 2017-06-30 RX ADMIN — Medication 0.5 MILLIGRAM(S): at 08:22

## 2017-06-30 RX ADMIN — AMLODIPINE BESYLATE 5 MILLIGRAM(S): 2.5 TABLET ORAL at 12:40

## 2017-06-30 RX ADMIN — HEPARIN SODIUM 5000 UNIT(S): 5000 INJECTION INTRAVENOUS; SUBCUTANEOUS at 14:06

## 2017-06-30 RX ADMIN — PRAMIPEXOLE DIHYDROCHLORIDE 0.12 MILLIGRAM(S): 0.12 TABLET ORAL at 12:40

## 2017-06-30 RX ADMIN — HYDROMORPHONE HYDROCHLORIDE 1 MILLIGRAM(S): 2 INJECTION INTRAMUSCULAR; INTRAVENOUS; SUBCUTANEOUS at 07:43

## 2017-06-30 RX ADMIN — Medication 145 MILLIGRAM(S): at 12:40

## 2017-06-30 RX ADMIN — Medication 100 MILLIGRAM(S): at 17:26

## 2017-06-30 RX ADMIN — Medication 100 MILLIGRAM(S): at 12:40

## 2017-06-30 RX ADMIN — HYDROMORPHONE HYDROCHLORIDE 2 MILLIGRAM(S): 2 INJECTION INTRAMUSCULAR; INTRAVENOUS; SUBCUTANEOUS at 00:24

## 2017-06-30 RX ADMIN — HYDROMORPHONE HYDROCHLORIDE 2 MILLIGRAM(S): 2 INJECTION INTRAMUSCULAR; INTRAVENOUS; SUBCUTANEOUS at 17:19

## 2017-06-30 RX ADMIN — HYDROMORPHONE HYDROCHLORIDE 2 MILLIGRAM(S): 2 INJECTION INTRAMUSCULAR; INTRAVENOUS; SUBCUTANEOUS at 12:45

## 2017-06-30 RX ADMIN — SODIUM CHLORIDE 50 MILLILITER(S): 9 INJECTION, SOLUTION INTRAVENOUS at 11:05

## 2017-06-30 RX ADMIN — Medication 100 MILLIGRAM(S): at 05:10

## 2017-06-30 RX ADMIN — SIMVASTATIN 10 MILLIGRAM(S): 20 TABLET, FILM COATED ORAL at 21:29

## 2017-06-30 RX ADMIN — HYDROMORPHONE HYDROCHLORIDE 2 MILLIGRAM(S): 2 INJECTION INTRAMUSCULAR; INTRAVENOUS; SUBCUTANEOUS at 23:51

## 2017-06-30 RX ADMIN — PIPERACILLIN AND TAZOBACTAM 25 GRAM(S): 4; .5 INJECTION, POWDER, LYOPHILIZED, FOR SOLUTION INTRAVENOUS at 21:28

## 2017-06-30 RX ADMIN — ISOSORBIDE MONONITRATE 120 MILLIGRAM(S): 60 TABLET, EXTENDED RELEASE ORAL at 12:45

## 2017-06-30 RX ADMIN — HEPARIN SODIUM 5000 UNIT(S): 5000 INJECTION INTRAVENOUS; SUBCUTANEOUS at 21:28

## 2017-06-30 RX ADMIN — Medication 325 MILLIGRAM(S): at 12:40

## 2017-06-30 RX ADMIN — HYDROMORPHONE HYDROCHLORIDE 2 MILLIGRAM(S): 2 INJECTION INTRAMUSCULAR; INTRAVENOUS; SUBCUTANEOUS at 06:21

## 2017-06-30 RX ADMIN — Medication 25 MILLIGRAM(S): at 06:21

## 2017-06-30 RX ADMIN — PIPERACILLIN AND TAZOBACTAM 25 GRAM(S): 4; .5 INJECTION, POWDER, LYOPHILIZED, FOR SOLUTION INTRAVENOUS at 06:21

## 2017-06-30 RX ADMIN — PIPERACILLIN AND TAZOBACTAM 25 GRAM(S): 4; .5 INJECTION, POWDER, LYOPHILIZED, FOR SOLUTION INTRAVENOUS at 14:06

## 2017-06-30 RX ADMIN — HYDROMORPHONE HYDROCHLORIDE 2 MILLIGRAM(S): 2 INJECTION INTRAMUSCULAR; INTRAVENOUS; SUBCUTANEOUS at 07:15

## 2017-06-30 RX ADMIN — Medication 8 MILLIGRAM(S): at 21:29

## 2017-06-30 RX ADMIN — HEPARIN SODIUM 5000 UNIT(S): 5000 INJECTION INTRAVENOUS; SUBCUTANEOUS at 06:21

## 2017-06-30 RX ADMIN — HYDROMORPHONE HYDROCHLORIDE 2 MILLIGRAM(S): 2 INJECTION INTRAMUSCULAR; INTRAVENOUS; SUBCUTANEOUS at 00:21

## 2017-06-30 RX ADMIN — Medication 0.5 MILLIGRAM(S): at 19:45

## 2017-06-30 NOTE — PROGRESS NOTE ADULT - PROBLEM SELECTOR PLAN 2
- inguinal wound culture shows mixed GNR, EN spp and corynebacterium spp  - on vancomycin mg IV q12h and zosyn 3.375 gm IV q8h # 13  - S/P right groin wound debridement with wound vac in place  - S/P AKA POD#3  - Stump will be checked by Dr. Clement POD#3/4  - PT, OOB, Will need long term ABX for 6 weeks, PICC placed today  - Wound vac dressing to be changed on Sunday 7/2  - Patient will require 6 weeks of IV antibiotics at discharge

## 2017-06-30 NOTE — PHYSICAL THERAPY INITIAL EVALUATION ADULT - SKIN INTEGRITY
surgical incision/R inguinal open wound with wound VAC in place ,L postero-plantar heel with + pressure changes,,R AKA with bandaging intact/wound
B necrotic heels with dressings in place ,more extensive ulcers R leg posterolaterally/pressure ulcer

## 2017-06-30 NOTE — PHYSICAL THERAPY INITIAL EVALUATION ADULT - CRITERIA FOR SKILLED THERAPEUTIC INTERVENTIONS
impairments found
therapy frequency/predicted duration of therapy intervention/anticipated discharge recommendation/risk reduction/prevention/rehab potential/functional limitations in following categories/impairments found

## 2017-06-30 NOTE — PROGRESS NOTE ADULT - SUBJECTIVE AND OBJECTIVE BOX
Pt has been seen and examined with FP resident, resident supervised agree with a/p       Patient is a 82y old  Male who presents with a chief complaint of Sent from [facility] rehab because of low hemoglobin 7.7 (17 Jun 2017 00:37)        HPI:  82 year old male with history of CAD s/p stents (LAD, RCA bare metal around 9/16),PVD (RLE stent/ angioplasty and surgical debridment by Dr Siu 5/20 ) A.Fib not on anticoagulation due to GI bleeding, Hypertension, COPD on home O2 2L, SHAYY on nocturnal BIPAP, ex-smoker (smoked 1ppd X 50 years, quit 22 years ago),  Chronic diastolic CHF, Hyperlipidemia, PVD, Iron deficiency anemia, Chronic back pain, Gout, BPH, CKD III with baseline Cr 2. Was ecently admitted to  on  4/17 with anemia of GI bleeding, RLE and RUE DVTs,( received pRBCs and IVC filter discharged to rehab with aspirin) was readmitted to   ER on 5/4/17 for worsening anemia with Hb 6, worsening leg pain from ulcers and worsening renal function Cr 3.99 now presents from [facility] with anemia (7.7 on labs today decreasing from 9.5 over past few weeks). Pt c/o gradual onset  progressive fatigue over the last couple of weeks. Patient is unable to ambulate because of his leg cellulitis and ulcers. He denies any shortness of breath, palpitations / chest pain / light headedness. According to the daughter the attendants at [facility] did notice dark stools for the last couple of days. Patient denies any abdominal pain or change in bowel or urinary habits.  In ED, + guaiac. (17 Jun 2017 00:37)        PHYSICAL EXAM:  Vital Signs Last 24 Hrs  T(C): 36.4 (30 Jun 2017 05:52), Max: 37.4 (29 Jun 2017 23:55)  T(F): 97.6 (30 Jun 2017 05:52), Max: 99.3 (29 Jun 2017 23:55)  HR: 83 (30 Jun 2017 13:00) (68 - 102)  BP: 157/53 (30 Jun 2017 12:00) (142/36 - 168/59)  BP(mean): 79 (30 Jun 2017 12:00) (63 - 88)  RR: 24 (30 Jun 2017 13:00) (14 - 24)  SpO2: 96% (30 Jun 2017 08:00) (89% - 98%)  general- comfortable   -rs-aeeb,cta  -cvs-s1s2 normal   -p/a-soft,bs+   -extremity- right leg s/p AKA, groin wound present  -cns- non focal         A/P    #Anemia-Anemia due to chronic disease or gi bleed   -stable  -slightly dropping h/h, ct to monitor and transfuse prn     #fever- possibly from wound infection -groin wound currently active   -ct abx, need longer iv abx     #DVT pr-heparin    #hypernatremia- monitor it, free water intake    #ARF- iv fluids and monitoring     #nephrology evaluation     #above discussed with pt and all questions have been answered

## 2017-06-30 NOTE — PHYSICAL THERAPY INITIAL EVALUATION ADULT - ASR EQUIP NEEDS DISCH PT EVAL
Recommend overhead trapeze be placed to bed while hospitalized to promote upper body strength,allow pressure relief in supine;  other equipmentTBD at rehab facility
hospital bed with specialty mattress until mobility improved and can achieve frequent/timely positional changes with decreased assistance/wheelchair/transfer board/rolling walker (5 inch wheels)

## 2017-06-30 NOTE — PROGRESS NOTE ADULT - ASSESSMENT
83 y/o male with h/o CAD s/p stents (LAD, RCA bare metal around 9/16), PVD (RLE stent/ angioplasty) complicated with poorly healing right inguinal wound s/p prior surgical debridment (by Dr Siu 5/20 ) [email or web address] not on anticoagulation due to GI bleeding, Hypertension, COPD on home O2 2L, SHAYY on nocturnal BIPAP, Chronic diastolic CHF, Hyperlipidemia, PVD, Iron deficiency anemia, Chronic back pain, Gout, BPH, CKD III with baseline Cr 2, recent 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 was admitted on 6/16 for worsening anemia with Hb 6, worsening leg pain from ulcers and worsening renal function. He has gradual onset  progressive fatigue over the last couple of weeks. Patient is unable to ambulate because of his leg ulcers. In ER, he was started on vancomycin IV and zosyn.    1. Right inguinal open wound with probable infection with mixed GNR, corynebacterium spp and EN spp.  Right leg AKA  -inguinal wound culture shows mixed GNR, EN spp and corynebacterium spp  -on vancomycin 500 mg IV q12h and zosyn 3.375 gm IV q8h # 13  -tolerating abx well so far; no side effects noted  -vancomycin trough level is borderline high; decrease vancomycin dose to 400 mg IV q12h  -local wound care per surgery  -PICC line  -continue IV abx coverage   -plan for 6 weeks of IV abx coverage  -monitor temps  -f/u CBC  -supportive care  2. Other issues: CAD s/p stents, PVD, [email or web address] not on anticoagulation due to GI bleeding, Hypertension, COPD, SHAYY on nocturnal BIPAP, Chronic diastolic CHF, Hyperlipidemia, PVD, Iron deficiency anemia, Chronic back pain, Gout, BPH, CKD III   -care per medicine

## 2017-06-30 NOTE — PHYSICAL THERAPY INITIAL EVALUATION ADULT - BALANCE TRAINING, PT EVAL
sitting balance ,adjusting to loss of counter weight RLE when sitting, progress to standing balance on LLE at RW as strength permits

## 2017-06-30 NOTE — PHYSICAL THERAPY INITIAL EVALUATION ADULT - PRECAUTIONS/LIMITATIONS, REHAB EVAL
wound VAC to R groin due to open draining wound s/p RLE angiogram fall precautions/wound VAC to R groin due to open draining infected  wound s/p RLE angiogram 5/4/17 and subsequent debridement of dehisced wound

## 2017-06-30 NOTE — PROGRESS NOTE ADULT - ASSESSMENT
82 year old male patient s/p AKBHUPENDRA RLE (6/27/17) is seen and evaluated for:      1. Pre-ulcerative lesion Left plantar heel; No clinical signs of infection noted  2. Other issues PVD; s/p AKA      P:  Pt was seen and examined. Plan was discussed with attending Dr. Fermin.  Mechanical debridement of mycotic toenails x5 of the left foot with sterile nail nipper. Betadine applied post debridement.  No acute intervention at this time  Continue use of Z-flex boots while in bed  Recommend IV abx per ID, appreciated.  Care per Medicine and Vascular surgery appreciated.  Podiatry to follow. 82 year old male patient s/p AKBHUPENDRA RLE (6/27/17) is seen and evaluated for:      1. Pre-ulcerative lesion Left plantar heel; No clinical signs of infection noted  2. Onychomycosis, left foot  3. Other issues PVD; s/p AKA      P:  Pt was seen and examined. Plan was discussed with attending Dr. Fermin.  Mechanical debridement of mycotic toenails x5 of the left foot with sterile nail nipper. Betadine applied post debridement.  No acute intervention at this time  Continue use of Z-flex boots while in bed  Recommend IV abx per ID, appreciated.  Care per Medicine and Vascular surgery appreciated.  Podiatry to follow.

## 2017-06-30 NOTE — CONSULT NOTE ADULT - ATTENDING COMMENTS
# CKD stage 3   # Hypernatremia  # s/p AKA  with PAD    PLAN  - DC IVF, pt is chronically at this level  - In am start his lasix at 40 mg po qd and dc to rehab with lasix dose  - monitor PO intake of fluid and low salt diet  - dose vanc by levels, fu trend of the trough  - strict i/o

## 2017-06-30 NOTE — PROGRESS NOTE ADULT - SUBJECTIVE AND OBJECTIVE BOX
stable, no pain    MEDICATIONS  (STANDING):  aspirin  chewable 81 milliGRAM(s) Oral daily  heparin  Injectable 5000 Unit(s) SubCutaneous every 8 hours  allopurinol 100 milliGRAM(s) Oral daily  doxazosin 8 milliGRAM(s) Oral at bedtime  fenofibrate Tablet 145 milliGRAM(s) Oral daily  ferrous    sulfate 325 milliGRAM(s) Oral two times a day with meals  gabapentin 300 milliGRAM(s) Oral daily  hydrALAZINE 50 milliGRAM(s) Oral daily  HYDROmorphone   Tablet 2 milliGRAM(s) Oral every 6 hours  isosorbide   mononitrate ER Tablet (IMDUR) 120 milliGRAM(s) Oral daily  pramipexole 0.125 milliGRAM(s) Oral daily  senna 2 Tablet(s) Oral at bedtime  simvastatin 10 milliGRAM(s) Oral at bedtime  piperacillin/tazobactam IVPB. 3.375 Gram(s) IV Intermittent every 8 hours  ammonium lactate 12% Lotion 1 Application(s) Topical daily  buDESOnide   0.5 milliGRAM(s) Respule 0.5 milliGRAM(s) Inhalation two times a day  metoprolol 25 milliGRAM(s) Oral two times a day  vancomycin  IVPB 500 milliGRAM(s) IV Intermittent every 12 hours  sodium chloride 0.45%. 1000 milliLiter(s) (50 mL/Hr) IV Continuous <Continuous>    MEDICATIONS  (PRN):  HYDROmorphone  Injectable 1 milliGRAM(s) IV Push every 3 hours PRN Moderate Pain (4 - 6)  acetaminophen   Tablet. 500 milliGRAM(s) Oral every 6 hours PRN Mild Pain (1 - 3)  ALBUTerol    90 MICROgram(s) HFA Inhaler 2 Puff(s) Inhalation every 6 hours PRN Shortness of Breath  HYDROmorphone  Injectable 0.5 milliGRAM(s) IV Push every 6 hours PRN Severe Pain (7 - 10)      Allergies    No Known Allergies    Intolerances        Flatus: [ ] YES [ ] NO             Bowel Movement: [ ] YES [ ] NO  Pain (0-10):            Pain Control Adequate: [ ] YES [ ] NO  Nausea: [ ] YES [ ] NO            Vomiting: [ ] YES [ ] NO  Diarrhea: [ ] YES [ ] NO         Constipation: [ ] YES [ ] NO     Chest Pain: [ ] YES [ ] NO    SOB:  [ ] YES [ ] NO    Vital Signs Last 24 Hrs  T(C): 36.4 (30 Jun 2017 05:52), Max: 37.4 (29 Jun 2017 23:55)  T(F): 97.6 (30 Jun 2017 05:52), Max: 99.3 (29 Jun 2017 23:55)  HR: 74 (30 Jun 2017 06:00) (60 - 102)  BP: 160/53 (30 Jun 2017 06:00) (136/35 - 168/59)  BP(mean): 82 (30 Jun 2017 06:00) (61 - 88)  RR: 18 (30 Jun 2017 06:00) (14 - 19)  SpO2: 97% (30 Jun 2017 06:00) (89% - 98%)    I&O's Summary    29 Jun 2017 07:01  -  30 Jun 2017 07:00  --------------------------------------------------------  IN: 932 mL / OUT: 750 mL / NET: 182 mL        Physical Exam:  General: NAD, resting comfortably  Pulmonary: normal resp effort, CTA-B  Cardiovascular: NSR  Abdominal: soft, NT/ND  Extremities: WWP, normal strength  Neuro: A/O x 3, CNs II-XII grossly intact, normal motor/sensation, no focal deficits  Pulses:   Right:                                                                          Left:  R AKA dressing clean and dry; VAC in place in R groin; L heel with supf appearing ulcer/pressure sore  LABS:                        8.0    5.9   )-----------( 227      ( 30 Jun 2017 05:45 )             24.8     06-30    148<H>  |  114<H>  |  35<H>  ----------------------------<  121<H>  3.6   |  26  |  1.38<H>    Ca    7.8<L>      30 Jun 2017 05:45            CAPILLARY BLOOD GLUCOSE          RADIOLOGY & ADDITIONAL TESTS:

## 2017-06-30 NOTE — PROGRESS NOTE ADULT - PROBLEM SELECTOR PLAN 3
- Improved  - Start Norvasc 5mg daily   - Cont Metoprolol 25 daily to BID  - Cont Diltiazem  - Cont Doxazosin

## 2017-06-30 NOTE — CONSULT NOTE ADULT - CONSULT REQUESTED DATE/TIME
17-Jun-2017
22-Jun-2017 08:55
30-Jun-2017 17:44
17-Jun-2017 08:00
17-Jun-2017 10:00
17-Jun-2017 12:24
17-Jun-2017 14:36
18-Jun-2017 09:18

## 2017-06-30 NOTE — PHYSICAL THERAPY INITIAL EVALUATION ADULT - DISCHARGE DISPOSITION, PT EVAL
rehabilitation facility rehabilitation facility/may be candidate for ACUTE REHAB given new R Above Knee Amputation,needs PT/OT

## 2017-06-30 NOTE — PHYSICAL THERAPY INITIAL EVALUATION ADULT - LEVEL OF INDEPENDENCE: SUPINE/SIT, REHAB EVAL
pt on bedrest on specialty mattress ,will speak with Family medicine resident about upgrading; called Podiatry may be WBAT L foot in protective shoe or cast boot
not tested ,pt c/o fatigue after morning events including PICC line placement

## 2017-06-30 NOTE — PHYSICAL THERAPY INITIAL EVALUATION ADULT - LEVEL OF INDEPENDENCE: STAND/SIT, REHAB EVAL
would recommend mechanical lift at this time for out of bed to chair as tolerated until can be formally assessed when patient strength permits,and pt receptive to attempting with  at least 2PA withmattress deflated

## 2017-06-30 NOTE — PHYSICAL THERAPY INITIAL EVALUATION ADULT - ACTIVE RANGE OF MOTION EXAMINATION, REHAB EVAL
A/AAROM ; R AK residual limb with hip FLEX WFL,decreased hip EXT to 0 degrees,ABD to 20 degrees/deficits as listed below/bilateral upper extremity Active ROM was WFL (within functional limits)/Left LE Active ROM was WFL (within functional limits)
unable to test R foot/ankle passively due to pain,when attempting gentle DF with plantar forefoot pressure

## 2017-06-30 NOTE — PROGRESS NOTE ADULT - SUBJECTIVE AND OBJECTIVE BOX
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 Pt states he is feeling well. Pt denies left foot/ankle pain. Pt denies N/V/F/C/SOB/CP/Diarrhea/headaches.    MEDICATIONS:  MEDICATIONS  (STANDING):  aspirin  chewable 81 milliGRAM(s) Oral daily  heparin  Injectable 5000 Unit(s) SubCutaneous every 8 hours  allopurinol 100 milliGRAM(s) Oral daily  doxazosin 8 milliGRAM(s) Oral at bedtime  fenofibrate Tablet 145 milliGRAM(s) Oral daily  ferrous    sulfate 325 milliGRAM(s) Oral two times a day with meals  gabapentin 300 milliGRAM(s) Oral daily  hydrALAZINE 50 milliGRAM(s) Oral daily  HYDROmorphone   Tablet 2 milliGRAM(s) Oral every 6 hours  isosorbide   mononitrate ER Tablet (IMDUR) 120 milliGRAM(s) Oral daily  pramipexole 0.125 milliGRAM(s) Oral daily  senna 2 Tablet(s) Oral at bedtime  simvastatin 10 milliGRAM(s) Oral at bedtime  piperacillin/tazobactam IVPB. 3.375 Gram(s) IV Intermittent every 8 hours  ammonium lactate 12% Lotion 1 Application(s) Topical daily  buDESOnide   0.5 milliGRAM(s) Respule 0.5 milliGRAM(s) Inhalation two times a day  metoprolol 25 milliGRAM(s) Oral two times a day  vancomycin  IVPB 500 milliGRAM(s) IV Intermittent every 12 hours  dextrose 5%. 1000 milliLiter(s) (50 mL/Hr) IV Continuous <Continuous>    Allergies: NKFDA    Vital Signs Last 24 Hrs  T(C): 36.4 (30 Jun 2017 05:52), Max: 37.4 (29 Jun 2017 23:55)  T(F): 97.6 (30 Jun 2017 05:52), Max: 99.3 (29 Jun 2017 23:55)  HR: 68 (30 Jun 2017 08:00) (60 - 102)  BP: 160/53 (30 Jun 2017 06:00) (136/35 - 168/59)  BP(mean): 82 (30 Jun 2017 06:00) (61 - 88)  RR: 18 (30 Jun 2017 08:00) (14 - 21)  SpO2: 96% (30 Jun 2017 08:00) (89% - 98%)    I&O's Summary    29 Jun 2017 07:01  -  30 Jun 2017 07:00  --------------------------------------------------------  IN: 932 mL / OUT: 750 mL / NET: 182 mL      PHYSICAL EXAM:  Constitutional: NAD, awake and alert, well-developed  Extremities: Right LE AKA  Vascular: Weakly palpable DP and PT pulses of the left foot.  Neuro: Protective sensation is decreased.  Derm: Dressing noted to be clean, dry and intact on the LLE. Preulcerative lesion noted on the postero plantar left heel. No fluctuance noted of the left foot. No active drainage noted. No probe to bone. No malodor. Left hallux toenail are elongated, thickened and dystrophic. No acute signs of infection noted.  MSK: No pain upon palpation of the left lower leg and foot.     LABS: All Labs Reviewed:                        8.0    5.9   )-----------( 227      ( 30 Jun 2017 05:45 )             24.8     06-30    148<H>  |  114<H>  |  35<H>  ----------------------------<  121<H>  3.6   |  26  |  1.38<H>    Ca    7.8<L>      30 Jun 2017 05:45    Blood Culture:   Culture - Blood (06.17.17 @ 03:54)    Specimen Source: .Blood Blood    Culture Results:   No growth at 5 days. 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 Pt states he is feeling well. Pt denies left foot/ankle pain. Pt denies N/V/F/C/SOB/CP/Diarrhea/headaches.    MEDICATIONS:  MEDICATIONS  (STANDING):  aspirin  chewable 81 milliGRAM(s) Oral daily  heparin  Injectable 5000 Unit(s) SubCutaneous every 8 hours  allopurinol 100 milliGRAM(s) Oral daily  doxazosin 8 milliGRAM(s) Oral at bedtime  fenofibrate Tablet 145 milliGRAM(s) Oral daily  ferrous    sulfate 325 milliGRAM(s) Oral two times a day with meals  gabapentin 300 milliGRAM(s) Oral daily  hydrALAZINE 50 milliGRAM(s) Oral daily  HYDROmorphone   Tablet 2 milliGRAM(s) Oral every 6 hours  isosorbide   mononitrate ER Tablet (IMDUR) 120 milliGRAM(s) Oral daily  pramipexole 0.125 milliGRAM(s) Oral daily  senna 2 Tablet(s) Oral at bedtime  simvastatin 10 milliGRAM(s) Oral at bedtime  piperacillin/tazobactam IVPB. 3.375 Gram(s) IV Intermittent every 8 hours  ammonium lactate 12% Lotion 1 Application(s) Topical daily  buDESOnide   0.5 milliGRAM(s) Respule 0.5 milliGRAM(s) Inhalation two times a day  metoprolol 25 milliGRAM(s) Oral two times a day  vancomycin  IVPB 500 milliGRAM(s) IV Intermittent every 12 hours  dextrose 5%. 1000 milliLiter(s) (50 mL/Hr) IV Continuous <Continuous>    Allergies: NKFDA    Vital Signs Last 24 Hrs  T(C): 36.4 (30 Jun 2017 05:52), Max: 37.4 (29 Jun 2017 23:55)  T(F): 97.6 (30 Jun 2017 05:52), Max: 99.3 (29 Jun 2017 23:55)  HR: 68 (30 Jun 2017 08:00) (60 - 102)  BP: 160/53 (30 Jun 2017 06:00) (136/35 - 168/59)  BP(mean): 82 (30 Jun 2017 06:00) (61 - 88)  RR: 18 (30 Jun 2017 08:00) (14 - 21)  SpO2: 96% (30 Jun 2017 08:00) (89% - 98%)    I&O's Summary    29 Jun 2017 07:01  -  30 Jun 2017 07:00  --------------------------------------------------------  IN: 932 mL / OUT: 750 mL / NET: 182 mL      PHYSICAL EXAM:  Constitutional: NAD, awake and alert, well-developed  Extremities: Right LE AKA  Vascular: Weakly palpable DP and PT pulses of the left foot.  Neuro: Protective sensation is decreased.  Derm: Dressing noted to be clean, dry and intact on the LLE. Preulcerative lesion noted on the postero plantar left heel. No fluctuance noted of the left foot. No active drainage noted. No probe to bone. No malodor. Left foot toenails are elongated, thickened and dystrophic. No acute signs of infection noted.  MSK: No pain upon palpation of the left lower leg and foot.     LABS: All Labs Reviewed:                        8.0    5.9   )-----------( 227      ( 30 Jun 2017 05:45 )             24.8     06-30    148<H>  |  114<H>  |  35<H>  ----------------------------<  121<H>  3.6   |  26  |  1.38<H>    Ca    7.8<L>      30 Jun 2017 05:45    Blood Culture:   Culture - Blood (06.17.17 @ 03:54)    Specimen Source: .Blood Blood    Culture Results:   No growth at 5 days.

## 2017-06-30 NOTE — PROGRESS NOTE ADULT - ASSESSMENT
82 year old male patient s/p AKBHUPENDRA RLE (6/27/17) is seen and evaluated for:      1. Pre-ulcerative lesion Left plantar heel; No clinical signs of infection noted  2. Other issues PVD; s/p AKA      P:  Pt was seen and examined. Plan was discussed with attending Dr. Fermin.  Cavillon and allevyn pad applied to the left postero plantar heel. Z-flex boot reapplied.  No acute intervention at this time  Continue use of Z-flex boots while in bed  Recommend IV abx per ID, appreciated.  Care per Medicine and Vascular surgery appreciated.  Podiatry to follow.

## 2017-06-30 NOTE — PROGRESS NOTE ADULT - SUBJECTIVE AND OBJECTIVE BOX
Chief Complaint/Reason for Admission: Sent from [facility] rehab because of low hemoglobin 7.7	  History of Present Illness: 	  82 year old male with history of CAD s/p stents (LAD, RCA bare metal around 9/16),PVD (RLE stent/ angioplasty and surgical debridment by Dr Siu 5/20 ) Brianna not on anticoagulation due to GI bleeding, Hypertension, COPD on home O2 2L, SHAYY on nocturnal BIPAP, ex-smoker (smoked 1ppd X 50 years, quit 22 years ago),  Chronic diastolic CHF, Hyperlipidemia, PVD, Iron deficiency anemia, Chronic back pain, Gout, BPH, CKD III with baseline Cr 2. Was ecently admitted to  on  4/17 with anemia of GI bleeding, RLE and RUE DVTs,( received pRBCs and IVC filter discharged to rehab with aspirin) was readmitted to   ER on 5/4/17 for worsening anemia with Hb 6, worsening leg pain from ulcers and worsening renal function Cr 3.99 now presents from [facility] with anemia (7.7 on labs today decreasing from 9.5 over past few weeks). Pt c/o gradual onset  progressive fatigue over the last couple of weeks. Patient is unable to ambulate because of his leg cellulitis and ulcers. He denies any shortness of breath, palpitations / chest pain / light headedness. According to the daughter the attendants at [facility] did notice dark stools for the last couple of days. Patient denies any abdominal pain or change in bowel or urinary habits.  In ED, + guaiac.     6/19 - Patient seen and examined. Reports 6 loose BM since this morning.  Patient has been placed on isolation until Cdiff has been rulled out.  Afebrile. hemodynamically stable.     6/20 - Patient going for muscle flap of groin area tonight after 1u PRBCs for anem.  Hemodynamically stable. Patient had low grade temp overnight.  On Vanco/Zosyn #3.  Tolerating abx well.  No Diarrhea, no rash.     6/21 - Patient seen and examined with daughter at bedside eating breakfast.  S/P right femoral wound debridement Day #1.  Wound vac in place and functional.  patient is feeling well with no complaints at this time. Has elevated BP, Will increased dose of Metoprolol to BID.  Podiatry consult placed to evaluate right achilles tendon rupture repair. Hemodynamically stable, afebrile. On Vanco/Zosyn day #4.    6/22 - Patient seen and examined.  Hemodynamically stable. NAD, Afebrile. On Vanco.Zosyn Day #5.  patient is feeling well.  Has been seen and Dr. Cardenas and may require further debridement and or AKA.  Patient is in pain despite being on Dilaudid standing q6hr. Will add Dilaudid for breakthrough pain.     6/23 - Patient seen and examined with daughter at bedside.  Had a long conversation with patient and daughter about long term goals of care.  Wound debridement vs AKA, Prosthetics, Physical therapy.  Patient is very deconditioned from chronic right LE wounds.  Patient would most likely benifit from AKA in the long term.  Hemodynamically stable.  afebrile. No overnight events.  Tolerating abx well.     6/24 - Patient seen and examined with daughter at bedside.  Discussed AKA with patient and daughter.  Patient is leaning towards having the amputation done but will decide in the next few days.  Hemodynamically stable, pain controlled, afebrile.  No overnight events.     6/26 - Patient seen and examined with daughter at bedside.  Patient had discussed AKA with Dr. Clement and has decided on having procedure done.  Time of surgery not yet determined. Hemodynamically stable, afebrile.  Patient is feeling well, tolerating PO and seen by PT.      6/27 - Patient seen and examined with family at bedside.  Family has arrived for moral support since patient is to undergo AKA today.  He is in good spirits and is in agreement with plan.  Family is very supportive.  Patient is hemodynamically stable, afebrile.  No overnight events.      6/28 - POD#1 S/P R AKA.  Patient seen and examined with daughter at bedside.  No overnight events.  AKA successful, patient tolerated procedure well with no post op complications.  Patient received 1unit PRBCs during procedure.  In good spirits.  Pain well controlled with Dilaudid. Hemodynamically stable, afebrile.     6/29 - POD#2 S/P R AKA.  Patient seen and examined. In good spirits.  Pain well controlled.  Patient will need to be on 4 weeks of IV abx for right groin wound.  Wound vac dressing to be changed on sunday 7/2.  Hemodynamically stable. Afebrile.     6/30 - POD#3 S/P R AKA.  Patient seen and examined with daughter at bedside.  Pain well controlled. Hemodynamically stable, afebrile. Tolerating Abx well with no reaction. BP elevated, will start Norvasc 5mg daily to slightly decreased BP, taking into consideration PVD and AKA.  Discussed case with Dr. Clement.  Will evaluate stump by monday.  Patient will likely be ready to go to [facility] [place] on Monday.    MEDICATIONS  (STANDING):  aspirin  chewable 81 milliGRAM(s) Oral daily  heparin  Injectable 5000 Unit(s) SubCutaneous every 8 hours  allopurinol 100 milliGRAM(s) Oral daily  doxazosin 8 milliGRAM(s) Oral at bedtime  fenofibrate Tablet 145 milliGRAM(s) Oral daily  ferrous    sulfate 325 milliGRAM(s) Oral two times a day with meals  gabapentin 300 milliGRAM(s) Oral daily  hydrALAZINE 50 milliGRAM(s) Oral daily  HYDROmorphone   Tablet 2 milliGRAM(s) Oral every 6 hours  isosorbide   mononitrate ER Tablet (IMDUR) 120 milliGRAM(s) Oral daily  pramipexole 0.125 milliGRAM(s) Oral daily  senna 2 Tablet(s) Oral at bedtime  simvastatin 10 milliGRAM(s) Oral at bedtime  piperacillin/tazobactam IVPB. 3.375 Gram(s) IV Intermittent every 8 hours  ammonium lactate 12% Lotion 1 Application(s) Topical daily  buDESOnide   0.5 milliGRAM(s) Respule 0.5 milliGRAM(s) Inhalation two times a day  metoprolol 25 milliGRAM(s) Oral two times a day  dextrose 5%. 1000 milliLiter(s) (50 mL/Hr) IV Continuous <Continuous>  vancomycin  IVPB 400 milliGRAM(s) IV Intermittent every 12 hours  amLODIPine   Tablet 5 milliGRAM(s) Oral daily    MEDICATIONS  (PRN):  HYDROmorphone  Injectable 1 milliGRAM(s) IV Push every 3 hours PRN Moderate Pain (4 - 6)  acetaminophen   Tablet. 500 milliGRAM(s) Oral every 6 hours PRN Mild Pain (1 - 3)  ALBUTerol    90 MICROgram(s) HFA Inhaler 2 Puff(s) Inhalation every 6 hours PRN Shortness of Breath  HYDROmorphone  Injectable 0.5 milliGRAM(s) IV Push every 6 hours PRN Severe Pain (7 - 10)    ICU Vital Signs Last 24 Hrs  T(C): 36.4 (30 Jun 2017 05:52), Max: 37.4 (29 Jun 2017 23:55)  T(F): 97.6 (30 Jun 2017 05:52), Max: 99.3 (29 Jun 2017 23:55)  HR: 83 (30 Jun 2017 13:00) (68 - 102)  BP: 157/53 (30 Jun 2017 12:00) (142/36 - 168/59)  BP(mean): 79 (30 Jun 2017 12:00) (63 - 88)  ABP: --  ABP(mean): --  RR: 24 (30 Jun 2017 13:00) (14 - 24)  SpO2: 96% (30 Jun 2017 08:00) (89% - 98%)    GEN: NAD, comfortable, resting in bed  CV: S1S2, RRR, no mumur  RESP: good air movement, CTABL, no rales, rhonchi or wheezing  ABD: +BS, soft, ND, NT, no guarding, no rigidity  Skin: R groin wound vac in place and functional; Dressing in place s/p AKA  EXT: +2 radial and pedial pulses, no edema, no calve tenderness                                    8.0    5.9   )-----------( 227      ( 30 Jun 2017 05:45 )             24.8     30 Jun 2017 05:45    148    |  114    |  35     ----------------------------<  121    3.6     |  26     |  1.38     Ca    7.8        30 Jun 2017 05:45      EXAM:  LWR EXT (MRI) RT W O CON                        PROCEDURE DATE:  06/18/2017    IMPRESSION:   1.  Large skin ulcer at the posterior aspect of the heel.  2.  Fluid tracking from the skin ulcer towards the medial calcaneus   suspicious for small abscess.  3.  Osteomyelitis of the posterior calcaneus.  4.  Osteonecrosis within the posterior calcaneus.  5.  Partial tearing of the lateral Achilles insertion.  6.  Full-thickness tearing of the lateral cord of the plantar fascia with   partial tear of the central cord.

## 2017-06-30 NOTE — PROGRESS NOTE ADULT - SUBJECTIVE AND OBJECTIVE BOX
Patient is a 82y old  Male who presents with a chief complaint of [facility] 7.7 (2017 00:37)    HPI:  83 y/o male with h/o CAD s/p stents (LAD, RCA bare metal around ), PVD (RLE stent/ angioplasty) complicated with poorly healing right inguinal wound s/p prior surgical debridment (by Dr iSu  ) A.Fib not on anticoagulation due to GI bleeding, Hypertension, COPD on home O2 2L, SHAYY on nocturnal BIPAP, Chronic diastolic CHF, Hyperlipidemia, PVD, Iron deficiency anemia, Chronic back pain, Gout, BPH, CKD III with baseline Cr 2, 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 He has gradual onset  progressive fatigue over the last couple of weeks. Patient is unable to ambulate because of his leg ulcers. In ER, he was started on vancomycin IV and zosyn.    s/p right leg AKA  Lying in bed in NAD  Has mild right thigh pain  Alert    MEDICATIONS  (STANDING):  aspirin  chewable 81 milliGRAM(s) Oral daily  heparin  Injectable 5000 Unit(s) SubCutaneous every 8 hours  allopurinol 100 milliGRAM(s) Oral daily  doxazosin 8 milliGRAM(s) Oral at bedtime  fenofibrate Tablet 145 milliGRAM(s) Oral daily  ferrous    sulfate 325 milliGRAM(s) Oral two times a day with meals  gabapentin 300 milliGRAM(s) Oral daily  hydrALAZINE 50 milliGRAM(s) Oral daily  HYDROmorphone   Tablet 2 milliGRAM(s) Oral every 6 hours  isosorbide   mononitrate ER Tablet (IMDUR) 120 milliGRAM(s) Oral daily  pramipexole 0.125 milliGRAM(s) Oral daily  senna 2 Tablet(s) Oral at bedtime  simvastatin 10 milliGRAM(s) Oral at bedtime  piperacillin/tazobactam IVPB. 3.375 Gram(s) IV Intermittent every 8 hours  ammonium lactate 12% Lotion 1 Application(s) Topical daily  buDESOnide   0.5 milliGRAM(s) Respule 0.5 milliGRAM(s) Inhalation two times a day  metoprolol 25 milliGRAM(s) Oral two times a day  vancomycin  IVPB 500 milliGRAM(s) IV Intermittent every 12 hours  dextrose 5%. 1000 milliLiter(s) (50 mL/Hr) IV Continuous <Continuous>    MEDICATIONS  (PRN):  HYDROmorphone  Injectable 1 milliGRAM(s) IV Push every 3 hours PRN Moderate Pain (4 - 6)  acetaminophen   Tablet. 500 milliGRAM(s) Oral every 6 hours PRN Mild Pain (1 - 3)  ALBUTerol    90 MICROgram(s) HFA Inhaler 2 Puff(s) Inhalation every 6 hours PRN Shortness of Breath  HYDROmorphone  Injectable 0.5 milliGRAM(s) IV Push every 6 hours PRN Severe Pain (7 - 10)      Vital Signs Last 24 Hrs  T(C): 36.4 (2017 05:52), Max: 37.4 (2017 23:55)  T(F): 97.6 (2017 05:52), Max: 99.3 (2017 23:55)  HR: 68 (2017 08:00) (60 - 102)  BP: 160/53 (2017 06:00) (137/45 - 168/59)  BP(mean): 82 (2017 06:00) (63 - 88)  RR: 18 (2017 08:00) (14 - 21)  SpO2: 96% (2017 08:00) (89% - 98%)    Physical Exam:      Constitutional: frail looking  HEENT: NC/AT, EOMI, PERRLA  Neck: supple  Back: no tenderness  Respiratory: decreased BS at bases  Cardiovascular: S1S2 regular, no murmurs  Abdomen: soft, not tender, not distended, positive BS  Genitourinary: right inguinal open wound packed  Rectal: deferred  Musculoskeletal: no muscle tenderness, no joint swelling or tenderness  Extremities: 1+ pedal edema; right inguinal open wound - deep; right AKA  Neurological: AxOx3, moving all extremities, no focal deficits  Skin: no rashes    Labs:                        8.0    5.9   )-----------( 227      ( 2017 05:45 )             24.8     06-30    148<H>  |  114<H>  |  35<H>  ----------------------------<  121<H>  3.6   |  26  |  1.38<H>    Ca    7.8<L>      2017 05:45         Vancomycin Level, Trough: 20.1 ug/mL ( @ 05:22)      Cultures:                         8.7    8.9   )-----------( 254      ( 2017 06:04 )             27.0     06-    143  |  x   |  x   ----------------------------<  x   x    |  x   |  x     Ca    7.9<L>      2017 06:04         Vancomycin Level, Trough: 20.1 ug/mL ( @ 05:22)                          8.7    8.9   )-----------( 254      ( 2017 06:04 )             27.0     06-    150<H>  |  115<H>  |  32<H>  ----------------------------<  69<L>  3.8   |  22  |  1.40<H>    Ca    7.9<L>      2017 06:04                          8.6    7.2   )-----------( 284      ( 2017 07:16 )             26.6     06-26    145  |  112<H>  |  30<H>  ----------------------------<  98  3.9   |  27  |  1.25    Ca    7.8<L>      2017 07:16                          10.2   8.0   )-----------( 366      ( 2017 06:22 )             32.7     06-21    148<H>  |  115<H>  |  29<H>  ----------------------------<  133<H>  3.8   |  23  |  1.29    Ca    7.7<L>      2017 06:22                          8.3    6.5   )-----------( 367      ( 2017 06:25 )             25.6     06-20    146<H>  |  113<H>  |  32<H>  ----------------------------<  118<H>  3.2<L>   |  27  |  1.33<H>    Ca    7.9<L>      2017 06:25         Vancomycin Level, Trough: 14.2 ug/mL ( @ 05:00)      Cultures:                       8.1    7.0   )-----------( 331      ( 2017 05:00 )             25.2     06-    151<H>  |  116<H>  |  40<H>  ----------------------------<  97  3.6   |  27  |  1.38<H>    Ca    8.1<L>      2017 05:00         Vancomycin Level, Trough: 14.2 ug/mL ( @ 05:00)                          8.2    8.5   )-----------( 312      ( 2017 03:54 )             25.0     -17    148<H>  |  114<H>  |  53<H>  ----------------------------<  129<H>  3.8   |  28  |  1.65<H>    Ca    8.2<L>      2017 03:54  Phos  1.9     -  Mg     2.1         TPro  5.2<L>  /  Alb  1.7<L>  /  TBili  0.3  /  DBili  x   /  AST  20  /  ALT  12  /  AlkPhos  57  -17     LIVER FUNCTIONS - ( 2017 03:54 )  Alb: 1.7 g/dL / Pro: 5.2 gm/dL / ALK PHOS: 57 U/L / ALT: 12 U/L / AST: 20 U/L / GGT: x           Urinalysis Basic - ( 2017 10:36 )    Color: Yellow / Appearance: Clear / S.010 / pH: x  Gluc: x / Ketone: Negative  / Bili: Negative / Urobili: Negative mg/dL   Blood: x / Protein: 15 mg/dL / Nitrite: Negative   Culture - Other (17 @ 12:50)    Specimen Source: .Other right groin wound    Culture Results:   Moderate Mixed gram negative rods  Few Enterococcus species  Rare Corynebacterium species    Leuk Esterase: Trace / RBC: x / WBC 0-2   Sq Epi: x / Non Sq Epi: x / Bacteria: x    Culture - Blood (17 @ 03:54)    Specimen Source: .Blood Blood    Culture Results:   No growth to date.    Culture - Other (17 @ 12:50)    Specimen Source: .Other right groin wound    Culture Results:   Moderate Mixed gram negative rods  Few Enterococcus species          Radiology:    Right foot MRI:  1.  Large skin ulcer at the posterior aspect of the heel.  2.  Fluid tracking from the skin ulcer towards the medial calcaneus   suspicious for small abscess.  3.  Osteomyelitis of the posterior calcaneus.  4.  Osteonecrosis within the posterior calcaneus.  5.  Partial tearing of the lateral Achilles insertion.  6.  Full-thickness tearing of the lateral cord of the plantar fascia with   partial tear of the central cord.    Advanced directives addressed: full resuscitation

## 2017-06-30 NOTE — PHYSICAL THERAPY INITIAL EVALUATION ADULT - MANUAL MUSCLE TESTING RESULTS, REHAB EVAL
BUE/LLE grossly GOOD minus range
pt with GOOD uppertbody strength,LLE is grossly GOOD-GOODminus ,RLE difficult to test due to pain R foot/leg

## 2017-06-30 NOTE — PROGRESS NOTE ADULT - PROBLEM SELECTOR PLAN 1
- HH stable  - Likely Anemia of Chronic Disease  - No EGD - patient is high risk  - S/p 2u PRBC  - Monitor HH

## 2017-06-30 NOTE — CONSULT NOTE ADULT - PROVIDER SPECIALTY LIST ADULT
Cardiology
Gastroenterology
Infectious Disease
Nephrology
Podiatry
Vascular Surgery
Vascular Surgery
Surgery

## 2017-06-30 NOTE — PHYSICAL THERAPY INITIAL EVALUATION ADULT - IMPAIRMENTS FOUND, PT EVAL
circulation/gait, locomotion, and balance/sensory integrity/integumentary integrity/fine motor/muscle strength
integumentary integrity/aerobic capacity/endurance/muscle strength/gait, locomotion, and balance

## 2017-06-30 NOTE — CONSULT NOTE ADULT - SUBJECTIVE AND OBJECTIVE BOX
NEPHROLOGY INTERVAL HPI/OVERNIGHT EVENTS:  81 y/o wm with hx of CKD stage 3 ( scr in low 2's in past)  presents from [facility] with anemia with hgb of 7.7..      During course of hospitalizeation, pt transfused and EGD was held due to his high risk.    Noted with leg ulcer that was wornseing and with rt groin wound debridement with s/p now AKA earlier this week.    PICC line placed for tx of malcolm revealing GNR with ENspp and corynebacteriam.  on Vanc and Zosyn with total of 6 weeks of abx planned.      Now with variable renal function in setting of higher vanc trough with Na values in 148.  Trial of IVF given and started this AM          PAST MEDICAL & SURGICAL HISTORY:  - aniceto on cpap   - CKD stage 3 ( scr 2)  - DM  - GI bleed  - BPH s/p green light procedure  -gout  - HTN  - cad s/p pci  -COPD  - spincal stenosis  - HLD  - GERD  - PAD   - s/p rotator cuff tear      FAMILY HISTORY:  No pertinent family history in first degree relatives      MEDICATIONS  (STANDING):  aspirin  chewable 81 milliGRAM(s) Oral daily  heparin  Injectable 5000 Unit(s) SubCutaneous every 8 hours  allopurinol 100 milliGRAM(s) Oral daily  doxazosin 8 milliGRAM(s) Oral at bedtime  fenofibrate Tablet 145 milliGRAM(s) Oral daily  ferrous    sulfate 325 milliGRAM(s) Oral two times a day with meals  gabapentin 300 milliGRAM(s) Oral daily  hydrALAZINE 50 milliGRAM(s) Oral daily  HYDROmorphone   Tablet 2 milliGRAM(s) Oral every 6 hours  isosorbide   mononitrate ER Tablet (IMDUR) 120 milliGRAM(s) Oral daily  pramipexole 0.125 milliGRAM(s) Oral daily  senna 2 Tablet(s) Oral at bedtime  simvastatin 10 milliGRAM(s) Oral at bedtime  piperacillin/tazobactam IVPB. 3.375 Gram(s) IV Intermittent every 8 hours  ammonium lactate 12% Lotion 1 Application(s) Topical daily  buDESOnide   0.5 milliGRAM(s) Respule 0.5 milliGRAM(s) Inhalation two times a day  metoprolol 25 milliGRAM(s) Oral two times a day  dextrose 5%. 1000 milliLiter(s) (50 mL/Hr) IV Continuous <Continuous>  vancomycin  IVPB 400 milliGRAM(s) IV Intermittent every 12 hours  amLODIPine   Tablet 5 milliGRAM(s) Oral daily    MEDICATIONS  (PRN):  HYDROmorphone  Injectable 1 milliGRAM(s) IV Push every 3 hours PRN Moderate Pain (4 - 6)  acetaminophen   Tablet. 500 milliGRAM(s) Oral every 6 hours PRN Mild Pain (1 - 3)  ALBUTerol    90 MICROgram(s) HFA Inhaler 2 Puff(s) Inhalation every 6 hours PRN Shortness of Breath  HYDROmorphone  Injectable 0.5 milliGRAM(s) IV Push every 6 hours PRN Severe Pain (7 - 10)      Allergies    No Known Allergies    Intolerances        I&O's Summary    29 Jun 2017 07:01  -  30 Jun 2017 07:00  --------------------------------------------------------  IN: 932 mL / OUT: 750 mL / NET: 182 mL             Vital Signs Last 24 Hrs  T(C): 37.5 (30 Jun 2017 15:26), Max: 37.5 (30 Jun 2017 15:26)  T(F): 99.5 (30 Jun 2017 15:26), Max: 99.5 (30 Jun 2017 15:26)  HR: 83 (30 Jun 2017 13:00) (68 - 102)  BP: 157/53 (30 Jun 2017 12:00) (142/36 - 168/59)  BP(mean): 79 (30 Jun 2017 12:00) (63 - 88)  RR: 24 (30 Jun 2017 13:00) (14 - 24)  SpO2: 96% (30 Jun 2017 08:00) (89% - 98%)  Daily     Daily   I&O's Summary    29 Jun 2017 07:01  -  30 Jun 2017 07:00  --------------------------------------------------------  IN: 932 mL / OUT: 750 mL / NET: 182 mL        PHYSICAL EXAM:  GEN: alert awake O X 3  HEENT: MMM  NECK supple no jvd  CV: RRR s1s2  LUNGS: b/l CTA  ABD: + soft,   EXT: left 1+ edema  right AKA    LABS:                        8.0    5.9   )-----------( 227      ( 30 Jun 2017 05:45 )             24.8     06-30    146<H>  |  x   |  x   ----------------------------<  x   x    |  x   |  x     Ca    7.8<L>      30 Jun 2017 05:45    SCR in 1.3-1.5 range

## 2017-06-30 NOTE — PHYSICAL THERAPY INITIAL EVALUATION ADULT - PATIENT PROFILE REVIEW, REHAB EVAL
Activity Level:OOB to chair 6/28/yes Activity Level:OOB to chair 6/28; pt is anemic ,H/H=8.0/24.8/yes

## 2017-06-30 NOTE — PHYSICAL THERAPY INITIAL EVALUATION ADULT - PERTINENT HX OF CURRENT PROBLEM, REHAB EVAL
pt with extensive  infected  nonhealing ulcers R foot/leg despite limb salvage surgery pt with OM R calacneus ,tears Achilles tendon & plantar fascia,+extensive  infected  nonhealing ulcers R foot/leg despite limb salvage surgery for severe PVD/ischemic R foot

## 2017-06-30 NOTE — CONSULT NOTE ADULT - CONSULT REASON
Rt groin wound
GI bleed
Partial tear of R Achilles tendon
R leg wounds
abx management
cad
ckd stage 3  hypernatremia
wounds R foot and leg

## 2017-06-30 NOTE — PROGRESS NOTE ADULT - PROBLEM SELECTOR PLAN 8
- Na 148  - Patient encouraged to drink more water.  - Started D5W @50cc  - Repeat Na at 4PM.   - monitor NA level    #DISPOSITION  - Patient will likely de discharged to [facility] on Monday 7/3  - Will need 6 weeks for IV abx, PICC in place.

## 2017-06-30 NOTE — PHYSICAL THERAPY INITIAL EVALUATION ADULT - GENERAL OBSERVATIONS, REHAB EVAL
recumbent in bed on specialty mattress with O2 via NC,B Z-Fluid boots to feet,dressings noted to R foot,+ wound VAC to R groin site,daughter Lore at bedside
resting on low air loss mattress awake & alert with wound VAC to R inguina,dressings/bandaging in place R AKA,O2 via NC,IV L radial wrist area

## 2017-07-01 LAB
ANION GAP SERPL CALC-SCNC: 7 MMOL/L — SIGNIFICANT CHANGE UP (ref 5–17)
BLD GP AB SCN SERPL QL: SIGNIFICANT CHANGE UP
BUN SERPL-MCNC: 37 MG/DL — HIGH (ref 7–23)
CALCIUM SERPL-MCNC: 7.6 MG/DL — LOW (ref 8.5–10.1)
CHLORIDE SERPL-SCNC: 112 MMOL/L — HIGH (ref 96–108)
CO2 SERPL-SCNC: 26 MMOL/L — SIGNIFICANT CHANGE UP (ref 22–31)
CREAT SERPL-MCNC: 1.37 MG/DL — HIGH (ref 0.5–1.3)
GLUCOSE SERPL-MCNC: 102 MG/DL — HIGH (ref 70–99)
HCT VFR BLD CALC: 24.2 % — LOW (ref 39–50)
HCT VFR BLD CALC: 25.8 % — LOW (ref 39–50)
HGB BLD-MCNC: 7.7 G/DL — LOW (ref 13–17)
HGB BLD-MCNC: 8.6 G/DL — LOW (ref 13–17)
MCHC RBC-ENTMCNC: 28.9 PG — SIGNIFICANT CHANGE UP (ref 27–34)
MCHC RBC-ENTMCNC: 29.8 PG — SIGNIFICANT CHANGE UP (ref 27–34)
MCHC RBC-ENTMCNC: 31.7 GM/DL — LOW (ref 32–36)
MCHC RBC-ENTMCNC: 33.5 GM/DL — SIGNIFICANT CHANGE UP (ref 32–36)
MCV RBC AUTO: 89.1 FL — SIGNIFICANT CHANGE UP (ref 80–100)
MCV RBC AUTO: 91.2 FL — SIGNIFICANT CHANGE UP (ref 80–100)
PLATELET # BLD AUTO: 207 K/UL — SIGNIFICANT CHANGE UP (ref 150–400)
PLATELET # BLD AUTO: 208 K/UL — SIGNIFICANT CHANGE UP (ref 150–400)
POTASSIUM SERPL-MCNC: 3.6 MMOL/L — SIGNIFICANT CHANGE UP (ref 3.5–5.3)
POTASSIUM SERPL-SCNC: 3.6 MMOL/L — SIGNIFICANT CHANGE UP (ref 3.5–5.3)
RBC # BLD: 2.65 M/UL — LOW (ref 4.2–5.8)
RBC # BLD: 2.89 M/UL — LOW (ref 4.2–5.8)
RBC # FLD: 16.6 % — HIGH (ref 10.3–14.5)
RBC # FLD: 16.9 % — HIGH (ref 10.3–14.5)
SODIUM SERPL-SCNC: 145 MMOL/L — SIGNIFICANT CHANGE UP (ref 135–145)
TYPE + AB SCN PNL BLD: SIGNIFICANT CHANGE UP
WBC # BLD: 6.2 K/UL — SIGNIFICANT CHANGE UP (ref 3.8–10.5)
WBC # BLD: 6.9 K/UL — SIGNIFICANT CHANGE UP (ref 3.8–10.5)
WBC # FLD AUTO: 6.2 K/UL — SIGNIFICANT CHANGE UP (ref 3.8–10.5)
WBC # FLD AUTO: 6.9 K/UL — SIGNIFICANT CHANGE UP (ref 3.8–10.5)

## 2017-07-01 RX ORDER — PANTOPRAZOLE SODIUM 20 MG/1
40 TABLET, DELAYED RELEASE ORAL
Qty: 0 | Refills: 0 | Status: DISCONTINUED | OUTPATIENT
Start: 2017-07-01 | End: 2017-07-08

## 2017-07-01 RX ADMIN — HYDROMORPHONE HYDROCHLORIDE 2 MILLIGRAM(S): 2 INJECTION INTRAMUSCULAR; INTRAVENOUS; SUBCUTANEOUS at 23:26

## 2017-07-01 RX ADMIN — Medication 325 MILLIGRAM(S): at 08:51

## 2017-07-01 RX ADMIN — HYDROMORPHONE HYDROCHLORIDE 2 MILLIGRAM(S): 2 INJECTION INTRAMUSCULAR; INTRAVENOUS; SUBCUTANEOUS at 19:00

## 2017-07-01 RX ADMIN — HYDROMORPHONE HYDROCHLORIDE 2 MILLIGRAM(S): 2 INJECTION INTRAMUSCULAR; INTRAVENOUS; SUBCUTANEOUS at 13:14

## 2017-07-01 RX ADMIN — PANTOPRAZOLE SODIUM 40 MILLIGRAM(S): 20 TABLET, DELAYED RELEASE ORAL at 10:55

## 2017-07-01 RX ADMIN — HYDROMORPHONE HYDROCHLORIDE 1 MILLIGRAM(S): 2 INJECTION INTRAMUSCULAR; INTRAVENOUS; SUBCUTANEOUS at 10:55

## 2017-07-01 RX ADMIN — Medication 325 MILLIGRAM(S): at 17:06

## 2017-07-01 RX ADMIN — Medication 50 MILLIGRAM(S): at 06:05

## 2017-07-01 RX ADMIN — HYDROMORPHONE HYDROCHLORIDE 2 MILLIGRAM(S): 2 INJECTION INTRAMUSCULAR; INTRAVENOUS; SUBCUTANEOUS at 12:26

## 2017-07-01 RX ADMIN — PIPERACILLIN AND TAZOBACTAM 25 GRAM(S): 4; .5 INJECTION, POWDER, LYOPHILIZED, FOR SOLUTION INTRAVENOUS at 06:16

## 2017-07-01 RX ADMIN — PRAMIPEXOLE DIHYDROCHLORIDE 0.12 MILLIGRAM(S): 0.12 TABLET ORAL at 10:57

## 2017-07-01 RX ADMIN — GABAPENTIN 300 MILLIGRAM(S): 400 CAPSULE ORAL at 10:56

## 2017-07-01 RX ADMIN — AMLODIPINE BESYLATE 5 MILLIGRAM(S): 2.5 TABLET ORAL at 06:04

## 2017-07-01 RX ADMIN — Medication 0.5 MILLIGRAM(S): at 08:31

## 2017-07-01 RX ADMIN — PIPERACILLIN AND TAZOBACTAM 25 GRAM(S): 4; .5 INJECTION, POWDER, LYOPHILIZED, FOR SOLUTION INTRAVENOUS at 17:06

## 2017-07-01 RX ADMIN — PIPERACILLIN AND TAZOBACTAM 25 GRAM(S): 4; .5 INJECTION, POWDER, LYOPHILIZED, FOR SOLUTION INTRAVENOUS at 23:25

## 2017-07-01 RX ADMIN — SIMVASTATIN 10 MILLIGRAM(S): 20 TABLET, FILM COATED ORAL at 21:20

## 2017-07-01 RX ADMIN — HYDROMORPHONE HYDROCHLORIDE 1 MILLIGRAM(S): 2 INJECTION INTRAMUSCULAR; INTRAVENOUS; SUBCUTANEOUS at 09:49

## 2017-07-01 RX ADMIN — Medication 25 MILLIGRAM(S): at 06:03

## 2017-07-01 RX ADMIN — ISOSORBIDE MONONITRATE 120 MILLIGRAM(S): 60 TABLET, EXTENDED RELEASE ORAL at 11:00

## 2017-07-01 RX ADMIN — Medication 1 APPLICATION(S): at 12:26

## 2017-07-01 RX ADMIN — HEPARIN SODIUM 5000 UNIT(S): 5000 INJECTION INTRAVENOUS; SUBCUTANEOUS at 15:03

## 2017-07-01 RX ADMIN — Medication 100 MILLIGRAM(S): at 21:22

## 2017-07-01 RX ADMIN — Medication 81 MILLIGRAM(S): at 10:56

## 2017-07-01 RX ADMIN — HYDROMORPHONE HYDROCHLORIDE 2 MILLIGRAM(S): 2 INJECTION INTRAMUSCULAR; INTRAVENOUS; SUBCUTANEOUS at 18:16

## 2017-07-01 RX ADMIN — HEPARIN SODIUM 5000 UNIT(S): 5000 INJECTION INTRAVENOUS; SUBCUTANEOUS at 06:03

## 2017-07-01 RX ADMIN — HYDROMORPHONE HYDROCHLORIDE 2 MILLIGRAM(S): 2 INJECTION INTRAMUSCULAR; INTRAVENOUS; SUBCUTANEOUS at 23:56

## 2017-07-01 RX ADMIN — Medication 25 MILLIGRAM(S): at 17:06

## 2017-07-01 RX ADMIN — HEPARIN SODIUM 5000 UNIT(S): 5000 INJECTION INTRAVENOUS; SUBCUTANEOUS at 21:20

## 2017-07-01 RX ADMIN — Medication 0.5 MILLIGRAM(S): at 19:26

## 2017-07-01 RX ADMIN — HYDROMORPHONE HYDROCHLORIDE 2 MILLIGRAM(S): 2 INJECTION INTRAMUSCULAR; INTRAVENOUS; SUBCUTANEOUS at 06:33

## 2017-07-01 RX ADMIN — Medication 8 MILLIGRAM(S): at 21:20

## 2017-07-01 RX ADMIN — HYDROMORPHONE HYDROCHLORIDE 2 MILLIGRAM(S): 2 INJECTION INTRAMUSCULAR; INTRAVENOUS; SUBCUTANEOUS at 06:03

## 2017-07-01 RX ADMIN — Medication 100 MILLIGRAM(S): at 10:56

## 2017-07-01 RX ADMIN — Medication 145 MILLIGRAM(S): at 10:56

## 2017-07-01 RX ADMIN — Medication 100 MILLIGRAM(S): at 06:09

## 2017-07-01 NOTE — CHART NOTE - NSCHARTNOTEFT_GEN_A_CORE
called by rn as patient just finished his blood transufsion at 1630    will order repeat h and h for 1830    patient also having loose stools will d/c colace and if continues send stool for c. diff    d/w FARHAD and Dr Hernandez called by rn as patient just finished his blood transufsion at 1630    will order repeat h and h for 1830    patient also having loose stools will d/c senna and if continues send stool for c. diff    d/w FARHAD and Dr Hernandez

## 2017-07-01 NOTE — PROGRESS NOTE ADULT - SUBJECTIVE AND OBJECTIVE BOX
Chief Complaint/Reason for Admission: Sent from [facility] rehab because of low hemoglobin 7.7	  History of Present Illness: 	  82 year old male with history of CAD s/p stents (LAD, RCA bare metal around 9/16),PVD (RLE stent/ angioplasty and surgical debridment by Dr Siu 5/20 ) Brianna not on anticoagulation due to GI bleeding, Hypertension, COPD on home O2 2L, SHAYY on nocturnal BIPAP, ex-smoker (smoked 1ppd X 50 years, quit 22 years ago),  Chronic diastolic CHF, Hyperlipidemia, PVD, Iron deficiency anemia, Chronic back pain, Gout, BPH, CKD III with baseline Cr 2. Was ecently admitted to  on  4/17 with anemia of GI bleeding, RLE and RUE DVTs,( received pRBCs and IVC filter discharged to rehab with aspirin) was readmitted to   ER on 5/4/17 for worsening anemia with Hb 6, worsening leg pain from ulcers and worsening renal function Cr 3.99 now presents from [facility] with anemia (7.7 on labs today decreasing from 9.5 over past few weeks). Pt c/o gradual onset  progressive fatigue over the last couple of weeks. Patient is unable to ambulate because of his leg cellulitis and ulcers. He denies any shortness of breath, palpitations / chest pain / light headedness. According to the daughter the attendants at [facility] did notice dark stools for the last couple of days. Patient denies any abdominal pain or change in bowel or urinary habits.  In ED, + guaiac.     6/19 - Patient has loose BM and was ruled out for C diff.  6/20 - Patient going for muscle flap of groin area tonight after 1u PRBCs for anem.  Hemodynamically stable. Patient had low grade temp overnight. On Vanco/Zosyn #3.    6/21 - S/P right femoral wound debridement Day #1.  Wound vac in place and functional. patient is feeling well with no complaints at this time. Has elevated BP, Will increased dose of Metoprolol to BID.  Podiatry consult placed to evaluate right achilles tendon rupture repair. Hemodynamically stable, afebrile. On Vanco/Zosyn day #4.  6/22 -  Hemodynamically stable. NAD, Afebrile. On Vanco.Zosyn Day #5.  patient is feeling well.  Has been seen and Dr. Cardenas and may require further debridement and or AKA.   6/23 -  Had a long conversation with patient and daughter about long term goals of care.  Wound debridement vs AKA, Prosthetics, Physical therapy.  Patient is very deconditioned from chronic right LE wounds.  Patient would most likely benifit from AKA in the long term.   6/24  Discussed AKA with patient and daughter.  Patient is leaning towards having the amputation done but will decide in the next few days.  Hemodynamically stable, pain controlled, afebrile.  6/26 -   Patient had discussed AKA with Dr. Clement and has decided on having procedure done.    6/27  Family has arrived for moral support since patient is to undergo AKA today.  6/28 - POD#1 S/P R AKA.  AKA successful, patient tolerated procedure well with no post op complications.  Patient received 1unit PRBCs during procedure.    6/29 - POD#2 S/P R AKA.   Patient will need to be on 4 weeks of IV abx for right groin wound.  Wound vac dressing to be changed on sunday 7/2.    6/30 - POD#3 S/P R AKA. BP elevated, will start Norvasc 5mg daily to slightly decreased BP, taking into consideration PVD and AKA.  Discussed case with Dr. Clement.  Will evaluate stump by monday. Patient will likely be ready to go to [facility] on Monday.  7/1: Pt seen and examined at bedside.POD #4. Doing well. No complaints. Hgb 7.7 , will be given 1 unit PRBC      MEDICATIONS  (STANDING):  aspirin  chewable 81 milliGRAM(s) Oral daily  heparin  Injectable 5000 Unit(s) SubCutaneous every 8 hours  allopurinol 100 milliGRAM(s) Oral daily  doxazosin 8 milliGRAM(s) Oral at bedtime  fenofibrate Tablet 145 milliGRAM(s) Oral daily  ferrous    sulfate 325 milliGRAM(s) Oral two times a day with meals  gabapentin 300 milliGRAM(s) Oral daily  hydrALAZINE 50 milliGRAM(s) Oral daily  HYDROmorphone   Tablet 2 milliGRAM(s) Oral every 6 hours  isosorbide   mononitrate ER Tablet (IMDUR) 120 milliGRAM(s) Oral daily  pramipexole 0.125 milliGRAM(s) Oral daily  senna 2 Tablet(s) Oral at bedtime  simvastatin 10 milliGRAM(s) Oral at bedtime  piperacillin/tazobactam IVPB. 3.375 Gram(s) IV Intermittent every 8 hours  ammonium lactate 12% Lotion 1 Application(s) Topical daily  buDESOnide   0.5 milliGRAM(s) Respule 0.5 milliGRAM(s) Inhalation two times a day  metoprolol 25 milliGRAM(s) Oral two times a day  vancomycin  IVPB 400 milliGRAM(s) IV Intermittent every 12 hours  amLODIPine   Tablet 5 milliGRAM(s) Oral daily  pantoprazole    Tablet 40 milliGRAM(s) Oral before breakfast    MEDICATIONS  (PRN):  HYDROmorphone  Injectable 1 milliGRAM(s) IV Push every 3 hours PRN Moderate Pain (4 - 6)  acetaminophen   Tablet. 500 milliGRAM(s) Oral every 6 hours PRN Mild Pain (1 - 3)  ALBUTerol    90 MICROgram(s) HFA Inhaler 2 Puff(s) Inhalation every 6 hours PRN Shortness of Breath  HYDROmorphone  Injectable 0.5 milliGRAM(s) IV Push every 6 hours PRN Severe Pain (7 - 10)    GEN: NAD, comfortable, resting in bed  CV: S1S2, RRR, no mumur  RESP: good air movement, CTABL, no rales, rhonchi or wheezing  ABD: +BS, soft, ND, NT, no guarding, no rigidity  Skin: R groin wound vac in place and functional; Dressing in place s/p AKA  EXT: +2 radial and pedial pulses, no edema, no calve tenderness  Right  AKA stump healing well with intact and viable flaps and no evidence of infection      T(C): 36.8 (07-01-17 @ 13:21), Max: 38.3 (06-30-17 @ 23:14)  HR: 68 (07-01-17 @ 14:00) (65 - 102)  BP: 150/42 (07-01-17 @ 14:00) (134/43 - 158/41)  RR: 14 (07-01-17 @ 14:00) (14 - 25)                            7.7    6.9   )-----------( 208      ( 01 Jul 2017 04:51 )             24.2     01 Jul 2017 04:51    145    |  112    |  37     ----------------------------<  102    3.6     |  26     |  1.37     Ca    7.6        01 Jul 2017 04:51        CAPILLARY BLOOD GLUCOSE    EXAM:  LWR EXT (MRI) RT W O CON                        PROCEDURE DATE:  06/18/2017    IMPRESSION:   1.  Large skin ulcer at the posterior aspect of the heel.  2.  Fluid tracking from the skin ulcer towards the medial calcaneus   suspicious for small abscess.  3.  Osteomyelitis of the posterior calcaneus.  4.  Osteonecrosis within the posterior calcaneus.  5.  Partial tearing of the lateral Achilles insertion.  6.  Full-thickness tearing of the lateral cord of the plantar fascia with   partial tear of the central cord.

## 2017-07-01 NOTE — PROGRESS NOTE ADULT - SUBJECTIVE AND OBJECTIVE BOX
pain better    MEDICATIONS  (STANDING):  aspirin  chewable 81 milliGRAM(s) Oral daily  heparin  Injectable 5000 Unit(s) SubCutaneous every 8 hours  allopurinol 100 milliGRAM(s) Oral daily  doxazosin 8 milliGRAM(s) Oral at bedtime  fenofibrate Tablet 145 milliGRAM(s) Oral daily  ferrous    sulfate 325 milliGRAM(s) Oral two times a day with meals  gabapentin 300 milliGRAM(s) Oral daily  hydrALAZINE 50 milliGRAM(s) Oral daily  HYDROmorphone   Tablet 2 milliGRAM(s) Oral every 6 hours  isosorbide   mononitrate ER Tablet (IMDUR) 120 milliGRAM(s) Oral daily  pramipexole 0.125 milliGRAM(s) Oral daily  senna 2 Tablet(s) Oral at bedtime  simvastatin 10 milliGRAM(s) Oral at bedtime  piperacillin/tazobactam IVPB. 3.375 Gram(s) IV Intermittent every 8 hours  ammonium lactate 12% Lotion 1 Application(s) Topical daily  buDESOnide   0.5 milliGRAM(s) Respule 0.5 milliGRAM(s) Inhalation two times a day  metoprolol 25 milliGRAM(s) Oral two times a day  vancomycin  IVPB 400 milliGRAM(s) IV Intermittent every 12 hours  amLODIPine   Tablet 5 milliGRAM(s) Oral daily  pantoprazole    Tablet 40 milliGRAM(s) Oral before breakfast    MEDICATIONS  (PRN):  HYDROmorphone  Injectable 1 milliGRAM(s) IV Push every 3 hours PRN Moderate Pain (4 - 6)  acetaminophen   Tablet. 500 milliGRAM(s) Oral every 6 hours PRN Mild Pain (1 - 3)  ALBUTerol    90 MICROgram(s) HFA Inhaler 2 Puff(s) Inhalation every 6 hours PRN Shortness of Breath  HYDROmorphone  Injectable 0.5 milliGRAM(s) IV Push every 6 hours PRN Severe Pain (7 - 10)      Allergies    No Known Allergies    Intolerances        Flatus: [ ] YES [ ] NO             Bowel Movement: [ ] YES [ ] NO  Pain (0-10):            Pain Control Adequate: [ ] YES [ ] NO  Nausea: [ ] YES [ ] NO            Vomiting: [ ] YES [ ] NO  Diarrhea: [ ] YES [ ] NO         Constipation: [ ] YES [ ] NO     Chest Pain: [ ] YES [ ] NO    SOB:  [ ] YES [ ] NO    Vital Signs Last 24 Hrs  T(C): 36.2 (01 Jul 2017 05:53), Max: 38.3 (30 Jun 2017 23:14)  T(F): 97.1 (01 Jul 2017 05:53), Max: 100.9 (30 Jun 2017 23:14)  HR: 71 (01 Jul 2017 11:00) (65 - 102)  BP: 144/38 (01 Jul 2017 11:00) (134/43 - 158/41)  BP(mean): 67 (01 Jul 2017 11:00) (64 - 77)  RR: 17 (01 Jul 2017 11:00) (14 - 25)  SpO2: --    I&O's Summary    30 Jun 2017 07:01  -  01 Jul 2017 07:00  --------------------------------------------------------  IN: 1270 mL / OUT: 520 mL / NET: 750 mL    01 Jul 2017 07:01  -  01 Jul 2017 12:49  --------------------------------------------------------  IN: 360 mL / OUT: 200 mL / NET: 160 mL        Physical Exam:  General: NAD, resting comfortably  Pulmonary: normal resp effort, CTA-B  Cardiovascular: NSR  Abdominal: soft, NT/ND  Extremities: WWP, normal strength  Neuro: A/O x 3, CNs II-XII grossly intact, normal motor/sensation, no focal deficits  Pulses:   R AKA stump healing well with intact and viable flaps and no evidence of infection  LABS:                        7.7    6.9   )-----------( 208      ( 01 Jul 2017 04:51 )             24.2     07-01    145  |  112<H>  |  37<H>  ----------------------------<  102<H>  3.6   |  26  |  1.37<H>    Ca    7.6<L>      01 Jul 2017 04:51            CAPILLARY BLOOD GLUCOSE          RADIOLOGY & ADDITIONAL TESTS:

## 2017-07-01 NOTE — PROGRESS NOTE ADULT - PROBLEM SELECTOR PLAN 2
- inguinal wound culture shows mixed GNR, EN spp and corynebacterium spp  - on vancomycin mg IV q12h and zosyn 3.375 gm IV q8h # 14  - S/P right groin wound debridement with wound vac in place  - S/P AKA POD#4  - Stump will be checked by Dr. Clement POD # 4  - PT, OOB, Will need long term ABX for 6 weeks, PICC placed   - Wound vac dressing to be changed on Sunday 7/2  - Patient will require 6 weeks of IV antibiotics at discharge

## 2017-07-01 NOTE — PROGRESS NOTE ADULT - PROBLEM SELECTOR PLAN 1
- HH stable  - Likely Anemia of Chronic Disease  - No EGD - patient is high risk  - S/p 2u PRBC  - Monitor HH, received 1 unit PRBC today   check HH in evening

## 2017-07-01 NOTE — PROGRESS NOTE ADULT - ASSESSMENT
81 y/o wm with hx of CKD stage 3 ( scr in low 2's in past)  presents from [facility] with anemia with hgb of 7.7..    During course of hospitalization pt transfused and EGD was held due to his high risk.    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    PICC line placed for tx of malcolm revealing GNR with ENspp and corynebacteria  on Vanc and Zosyn with total of 6 weeks of abx planned.      Now with variable renal function in setting of higher vanc trough with Na values in 148.  Trial of IVF given and started this AM    7/1  doing well  more awake alert  Na better  pt has chronic hypernatremia  monitor K, Na creat

## 2017-07-01 NOTE — PROGRESS NOTE ADULT - ASSESSMENT
82 year old male patient s/p AKA RLE (6/27/17) is seen and evaluated for:      1. Pre-ulcerative lesion Left plantar heel; No clinical signs of infection noted  2. Onychomycosis, left foot  3. Other issues PVD; s/p AKA      P:  Pt was seen and examined. Plan was discussed with attending Dr. Fermin.  Cavillon applied to preulcerative lesion on left heel and Z-flex boot reapplied.  Toenails are trimmed; post debridement (6/30/17).  Continue use of Z-flex boots while in bed  Recommend IV abx per ID, appreciated. PICC line noted to be inserted for long term IVabx for treatment of groin wound.  Care per Medicine and Vascular surgery appreciated.  Podiatry to follow.

## 2017-07-01 NOTE — PROGRESS NOTE ADULT - SUBJECTIVE AND OBJECTIVE BOX
Pt has been seen and examined with FP resident, resident supervised agree with a/p       Patient is a 82y old  Male who presents with a chief complaint of Sent from [facility] rehab because of low hemoglobin 7.7 (17 Jun 2017 00:37)        HPI:  82 year old male with history of CAD s/p stents (LAD, RCA bare metal around 9/16),PVD (RLE stent/ angioplasty and surgical debridment by Dr Siu 5/20 ) A.Fib not on anticoagulation due to GI bleeding, Hypertension, COPD on home O2 2L, SHAYY on nocturnal BIPAP, ex-smoker (smoked 1ppd X 50 years, quit 22 years ago),  Chronic diastolic CHF, Hyperlipidemia, PVD, Iron deficiency anemia, Chronic back pain, Gout, BPH, CKD III with baseline Cr 2. Was ecently admitted to  on  4/17 with anemia of GI bleeding, RLE and RUE DVTs,( received pRBCs and IVC filter discharged to rehab with aspirin) was readmitted to   ER on 5/4/17 for worsening anemia with Hb 6, worsening leg pain from ulcers and worsening renal function Cr 3.99 now presents from [facility] with anemia (7.7 on labs today decreasing from 9.5 over past few weeks). Pt c/o gradual onset  progressive fatigue over the last couple of weeks. Patient is unable to ambulate because of his leg cellulitis and ulcers. He denies any shortness of breath, palpitations / chest pain / light headedness. According to the daughter the attendants at [facility] did notice dark stools for the last couple of days. Patient denies any abdominal pain or change in bowel or urinary habits.  In ED, + guaiac. (17 Jun 2017 00:37)        PHYSICAL EXAM:  Vital Signs Last 24 Hrs  T(C): 36.2 (01 Jul 2017 05:53), Max: 38.3 (30 Jun 2017 23:14)  T(F): 97.1 (01 Jul 2017 05:53), Max: 100.9 (30 Jun 2017 23:14)  HR: 88 (01 Jul 2017 08:33) (65 - 102)  BP: 134/43 (01 Jul 2017 08:00) (134/43 - 158/41)  BP(mean): 67 (01 Jul 2017 08:00) (64 - 79)  RR: 16 (01 Jul 2017 08:00) (14 - 25)  SpO2: --  general- comfortable   -rs-aeeb,cta  -cvs-s1s2 normal   -p/a-soft,bs+   -extremity- right leg s/p AKA, groin wound present  -cns- non focal         A/P    #Anemia-Anemia due to chronic disease or gi bleed     #Acute anemia on anemia due to chronic disease   -transfuse one unit of prbc in view of all multiple condition and comorbidities along with slightly dropping h/h     #fever- possibly from wound infection -groin wound currently active   -ct abx, need longer iv abx     #DVT pr-heparin    #hypernatremia- resolved     #ARF- iv fluids and monitoring      #above discussed with pt and all questions have been answered

## 2017-07-01 NOTE — PROGRESS NOTE ADULT - SUBJECTIVE AND OBJECTIVE BOX
NEPHROLOGY INTERVAL HPI/OVERNIGHT EVENTS:  83 y/o wm with hx of CKD stage 3 ( scr in low 2's in past)  presents from [facility] with anemia with hgb of 7.7..      During course of hospitalizeation, pt transfused and EGD was held due to his high risk.    Noted with leg ulcer that was wornseing and with rt groin wound debridement with s/p now AKA earlier this week.    PICC line placed for tx of malcolm revealing GNR with ENspp and corynebacteriam.  on Vanc and Zosyn with total of 6 weeks of abx planned.      Now with variable renal function in setting of higher vanc trough with Na values in 148.  Trial of IVF given and started this AM    7/1  doing well  more awake alert  Na better  pt has chronic hypernatremia          PAST MEDICAL & SURGICAL HISTORY:  - aniceto on cpap   - CKD stage 3 ( scr 2)  - DM  - GI bleed  - BPH s/p green light procedure  -gout  - HTN  - cad s/p pci  -COPD  - spincal stenosis  - HLD  - GERD  - PAD   - s/p rotator cuff tear      FAMILY HISTORY:  No pertinent family history in first degree relatives      MEDICATIONS  (STANDING):  aspirin  chewable 81 milliGRAM(s) Oral daily  heparin  Injectable 5000 Unit(s) SubCutaneous every 8 hours  allopurinol 100 milliGRAM(s) Oral daily  doxazosin 8 milliGRAM(s) Oral at bedtime  fenofibrate Tablet 145 milliGRAM(s) Oral daily  ferrous    sulfate 325 milliGRAM(s) Oral two times a day with meals  gabapentin 300 milliGRAM(s) Oral daily  hydrALAZINE 50 milliGRAM(s) Oral daily  HYDROmorphone   Tablet 2 milliGRAM(s) Oral every 6 hours  isosorbide   mononitrate ER Tablet (IMDUR) 120 milliGRAM(s) Oral daily  pramipexole 0.125 milliGRAM(s) Oral daily  senna 2 Tablet(s) Oral at bedtime  simvastatin 10 milliGRAM(s) Oral at bedtime  piperacillin/tazobactam IVPB. 3.375 Gram(s) IV Intermittent every 8 hours  ammonium lactate 12% Lotion 1 Application(s) Topical daily  buDESOnide   0.5 milliGRAM(s) Respule 0.5 milliGRAM(s) Inhalation two times a day  metoprolol 25 milliGRAM(s) Oral two times a day  dextrose 5%. 1000 milliLiter(s) (50 mL/Hr) IV Continuous <Continuous>  vancomycin  IVPB 400 milliGRAM(s) IV Intermittent every 12 hours  amLODIPine   Tablet 5 milliGRAM(s) Oral daily    MEDICATIONS  (PRN):  HYDROmorphone  Injectable 1 milliGRAM(s) IV Push every 3 hours PRN Moderate Pain (4 - 6)  acetaminophen   Tablet. 500 milliGRAM(s) Oral every 6 hours PRN Mild Pain (1 - 3)  ALBUTerol    90 MICROgram(s) HFA Inhaler 2 Puff(s) Inhalation every 6 hours PRN Shortness of Breath  HYDROmorphone  Injectable 0.5 milliGRAM(s) IV Push every 6 hours PRN Severe Pain (7 - 10)      Allergies    No Known Allergies    Intolerances        I&O's Summary    29 Jun 2017 07:01  -  30 Jun 2017 07:00  --------------------------------------------------------  IN: 932 mL / OUT: 750 mL / NET: 182 mL             Vital Signs Last 24 Hrs  T(C): 37.5 (30 Jun 2017 15:26), Max: 37.5 (30 Jun 2017 15:26)  T(F): 99.5 (30 Jun 2017 15:26), Max: 99.5 (30 Jun 2017 15:26)  HR: 83 (30 Jun 2017 13:00) (68 - 102)  BP: 157/53 (30 Jun 2017 12:00) (142/36 - 168/59)  BP(mean): 79 (30 Jun 2017 12:00) (63 - 88)  RR: 24 (30 Jun 2017 13:00) (14 - 24)  SpO2: 96% (30 Jun 2017 08:00) (89% - 98%)  Daily     Daily   I&O's Summary    29 Jun 2017 07:01  -  30 Jun 2017 07:00  --------------------------------------------------------  IN: 932 mL / OUT: 750 mL / NET: 182 mL        PHYSICAL EXAM:  GEN: alert awake O X 3  HEENT: MMM  NECK supple no jvd  CV: RRR s1s2  LUNGS: b/l CTA  ABD: + soft,   EXT: left 1+ edema  right AKA    LABS:                        8.0    5.9   )-----------( 227      ( 30 Jun 2017 05:45 )             24.8     06-30    146<H>  |  x   |  x   ----------------------------<  x   x    |  x   |  x     Ca    7.8<L>      30 Jun 2017 05:45    SCR in 1.3-1.5 range

## 2017-07-01 NOTE — PROGRESS NOTE ADULT - SUBJECTIVE AND OBJECTIVE BOX
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 Pt states he is feeling well. Pt denies left foot/ankle pain. States he had physical therapy this morning and it went well. Pt denies N/V/F/C/SOB/CP/Diarrhea/headaches.    PMH: CAD s/p stents (LAD, RCA bare metal around 9/16),PVD (RLE stent/ angioplasty and surgical debridment by Dr Siu 5/20 ) A.Fib not on anticoagulation due to GI bleeding, Hypertension, COPD on home O2 2L, SHAYY on nocturnal BIPAP, ex-smoker (smoked 1ppd X 50 years, quit 22 years ago),  Chronic diastolic CHF, Hyperlipidemia, PVD, Iron deficiency anemia, Chronic back pain, Gout, BPH, CKD III with baseline Cr 2.     MEDICATIONS:  MEDICATIONS  (STANDING):  aspirin  chewable 81 milliGRAM(s) Oral daily  heparin  Injectable 5000 Unit(s) SubCutaneous every 8 hours  allopurinol 100 milliGRAM(s) Oral daily  doxazosin 8 milliGRAM(s) Oral at bedtime  fenofibrate Tablet 145 milliGRAM(s) Oral daily  ferrous    sulfate 325 milliGRAM(s) Oral two times a day with meals  gabapentin 300 milliGRAM(s) Oral daily  hydrALAZINE 50 milliGRAM(s) Oral daily  HYDROmorphone   Tablet 2 milliGRAM(s) Oral every 6 hours  isosorbide   mononitrate ER Tablet (IMDUR) 120 milliGRAM(s) Oral daily  pramipexole 0.125 milliGRAM(s) Oral daily  senna 2 Tablet(s) Oral at bedtime  simvastatin 10 milliGRAM(s) Oral at bedtime  piperacillin/tazobactam IVPB. 3.375 Gram(s) IV Intermittent every 8 hours  ammonium lactate 12% Lotion 1 Application(s) Topical daily  buDESOnide   0.5 milliGRAM(s) Respule 0.5 milliGRAM(s) Inhalation two times a day  metoprolol 25 milliGRAM(s) Oral two times a day  vancomycin  IVPB 400 milliGRAM(s) IV Intermittent every 12 hours  amLODIPine   Tablet 5 milliGRAM(s) Oral daily  pantoprazole    Tablet 40 milliGRAM(s) Oral before breakfast    Allergies: NKFDA    Vital Signs Last 24 Hrs  T(C): 36.2 (01 Jul 2017 05:53), Max: 38.3 (30 Jun 2017 23:14)  T(F): 97.1 (01 Jul 2017 05:53), Max: 100.9 (30 Jun 2017 23:14)  HR: 88 (01 Jul 2017 08:33) (65 - 102)  BP: 134/43 (01 Jul 2017 08:00) (134/43 - 158/41)  BP(mean): 67 (01 Jul 2017 08:00) (64 - 79)  RR: 16 (01 Jul 2017 08:00) (14 - 25)  SpO2: --    I&O's Summary    30 Jun 2017 07:01  -  01 Jul 2017 07:00  --------------------------------------------------------  IN: 1270 mL / OUT: 520 mL / NET: 750 mL    01 Jul 2017 07:01  -  01 Jul 2017 11:03  --------------------------------------------------------  IN: 360 mL / OUT: 200 mL / NET: 160 mL    PHYSICAL EXAM:  Constitutional: NAD, awake and alert, well-developed  Extremities: Right LE AKA; dressing noted to be clean, dry and intact.  Vascular: Weakly palpable DP and PT pulses of the left foot.  Neuro: Protective sensation is decreased.  Derm: Preulcerative lesion noted on the postero plantar left heel. No fluctuance noted of the left foot. No active drainage noted. No probe to bone. No malodor. Left foot toenails are trimmed, thickened and dystrophic. No acute signs of infection noted.  MSK: No pain upon palpation of the left lower leg and foot.     LABS: All Labs Reviewed:                        7.7    6.9   )-----------( 208      ( 01 Jul 2017 04:51 )             24.2     07-01    145  |  112<H>  |  37<H>  ----------------------------<  102<H>  3.6   |  26  |  1.37<H>    Ca    7.6<L>      01 Jul 2017 04:51      Blood Culture:   Culture - Other (06.17.17 @ 12:50)    Specimen Source: .Other right groin wound    Culture Results:   Moderate Mixed gram negative rods  Few Enterococcus species  Rare Corynebacterium species      Culture - Blood (06.17.17 @ 03:54)    Specimen Source: .Blood Blood    Culture Results:   No growth at 5 days.    RADIOLOGY/EKG:    EXAM:  IR PROCEDURE PICC                            PROCEDURE DATE:  06/30/2017        INTERPRETATION:  Clinical Information:  heel ulcer    Procedure: Left arm single lumen Power PICC line placement.    : Chucky. The attending was presentthe entire procedure.    Anesthesia: Local.    Contrast: None.    Complications: None.    Procedure technique:    After informed consent was obtained from the patient, the patient was   placed supine on the table in the angiography suite. Time out was taken.   Grayscale ultrasound of the left upper extremity was performed and a   basilic vein was found to be patent. The arm was prepped and draped in   usual sterile fashion. Under direct ultrasound visualization, the vein   was accessed with a 21-gauge needle. The needle was exchanged over a   0.018 inch wire for a 5 Belgian peel-away sheath. The wire was passed into   the SVC under fluoroscopic visualization. The PICC catheter was cut to 46   cm and advanced under fluoroscopic visualization. The peel-away sheath   was removed. Catheter was positioned with the tip in the SVC and flushed.   Catheter was secured into place and a sterile dressing was applied.   Patient tolerated the procedure well and left the department in stable   condition.    Findings:    Grayscale ultrasound imaging of the left arm demonstrates patent basilic   vein. Fluoroscopic images demonstrate wire followed by catheter access to   the SVC.    Impression:    Left upper extremity single lumen 4 Belgian 46 cm Power PICC placement.   Tip is in the SVC with good aspiration and flush. Catheter may be used   immediately.

## 2017-07-01 NOTE — PROGRESS NOTE ADULT - ASSESSMENT
healing AKA    dressing applied    can be discharged to rehab facility next week from my perspective

## 2017-07-01 NOTE — PROGRESS NOTE ADULT - PROBLEM SELECTOR PLAN 3
- Improved  - Norvasc 5mg daily   - Cont Metoprolol 25 daily to BID  - Cont Diltiazem  - Cont Doxazosin

## 2017-07-02 DIAGNOSIS — N50.89 OTHER SPECIFIED DISORDERS OF THE MALE GENITAL ORGANS: ICD-10-CM

## 2017-07-02 DIAGNOSIS — I82.612 ACUTE EMBOLISM AND THROMBOSIS OF SUPERFICIAL VEINS OF LEFT UPPER EXTREMITY: ICD-10-CM

## 2017-07-02 DIAGNOSIS — R19.7 DIARRHEA, UNSPECIFIED: ICD-10-CM

## 2017-07-02 LAB
ANION GAP SERPL CALC-SCNC: 8 MMOL/L — SIGNIFICANT CHANGE UP (ref 5–17)
BUN SERPL-MCNC: 37 MG/DL — HIGH (ref 7–23)
C DIFF BY PCR RESULT: SIGNIFICANT CHANGE UP
C DIFF TOX GENS STL QL NAA+PROBE: SIGNIFICANT CHANGE UP
CALCIUM SERPL-MCNC: 7.9 MG/DL — LOW (ref 8.5–10.1)
CHLORIDE SERPL-SCNC: 112 MMOL/L — HIGH (ref 96–108)
CO2 SERPL-SCNC: 26 MMOL/L — SIGNIFICANT CHANGE UP (ref 22–31)
CREAT SERPL-MCNC: 1.45 MG/DL — HIGH (ref 0.5–1.3)
GLUCOSE SERPL-MCNC: 104 MG/DL — HIGH (ref 70–99)
HCT VFR BLD CALC: 27.4 % — LOW (ref 39–50)
HGB BLD-MCNC: 8.7 G/DL — LOW (ref 13–17)
MCHC RBC-ENTMCNC: 28.2 PG — SIGNIFICANT CHANGE UP (ref 27–34)
MCHC RBC-ENTMCNC: 31.8 GM/DL — LOW (ref 32–36)
MCV RBC AUTO: 88.7 FL — SIGNIFICANT CHANGE UP (ref 80–100)
PLATELET # BLD AUTO: 226 K/UL — SIGNIFICANT CHANGE UP (ref 150–400)
POTASSIUM SERPL-MCNC: 3.3 MMOL/L — LOW (ref 3.5–5.3)
POTASSIUM SERPL-SCNC: 3.3 MMOL/L — LOW (ref 3.5–5.3)
RBC # BLD: 3.08 M/UL — LOW (ref 4.2–5.8)
RBC # FLD: 17.2 % — HIGH (ref 10.3–14.5)
SODIUM SERPL-SCNC: 146 MMOL/L — HIGH (ref 135–145)
VANCOMYCIN TROUGH SERPL-MCNC: 20.3 UG/ML — HIGH (ref 10–20)
WBC # BLD: 6.4 K/UL — SIGNIFICANT CHANGE UP (ref 3.8–10.5)
WBC # FLD AUTO: 6.4 K/UL — SIGNIFICANT CHANGE UP (ref 3.8–10.5)

## 2017-07-02 PROCEDURE — 93971 EXTREMITY STUDY: CPT | Mod: 26,LT

## 2017-07-02 PROCEDURE — 76870 US EXAM SCROTUM: CPT | Mod: 26

## 2017-07-02 RX ORDER — FUROSEMIDE 40 MG
40 TABLET ORAL DAILY
Qty: 0 | Refills: 0 | Status: DISCONTINUED | OUTPATIENT
Start: 2017-07-03 | End: 2017-07-03

## 2017-07-02 RX ORDER — POTASSIUM CHLORIDE 20 MEQ
20 PACKET (EA) ORAL ONCE
Qty: 0 | Refills: 0 | Status: COMPLETED | OUTPATIENT
Start: 2017-07-02 | End: 2017-07-02

## 2017-07-02 RX ORDER — FUROSEMIDE 40 MG
40 TABLET ORAL ONCE
Qty: 0 | Refills: 0 | Status: COMPLETED | OUTPATIENT
Start: 2017-07-02 | End: 2017-07-02

## 2017-07-02 RX ORDER — POTASSIUM CHLORIDE 20 MEQ
40 PACKET (EA) ORAL ONCE
Qty: 0 | Refills: 0 | Status: COMPLETED | OUTPATIENT
Start: 2017-07-02 | End: 2017-07-02

## 2017-07-02 RX ADMIN — Medication 20 MILLIEQUIVALENT(S): at 21:08

## 2017-07-02 RX ADMIN — GABAPENTIN 300 MILLIGRAM(S): 400 CAPSULE ORAL at 12:08

## 2017-07-02 RX ADMIN — Medication 8 MILLIGRAM(S): at 21:05

## 2017-07-02 RX ADMIN — HYDROMORPHONE HYDROCHLORIDE 2 MILLIGRAM(S): 2 INJECTION INTRAMUSCULAR; INTRAVENOUS; SUBCUTANEOUS at 23:59

## 2017-07-02 RX ADMIN — Medication 40 MILLIEQUIVALENT(S): at 12:05

## 2017-07-02 RX ADMIN — SIMVASTATIN 10 MILLIGRAM(S): 20 TABLET, FILM COATED ORAL at 21:05

## 2017-07-02 RX ADMIN — HYDROMORPHONE HYDROCHLORIDE 2 MILLIGRAM(S): 2 INJECTION INTRAMUSCULAR; INTRAVENOUS; SUBCUTANEOUS at 12:06

## 2017-07-02 RX ADMIN — Medication 100 MILLIGRAM(S): at 12:06

## 2017-07-02 RX ADMIN — PIPERACILLIN AND TAZOBACTAM 25 GRAM(S): 4; .5 INJECTION, POWDER, LYOPHILIZED, FOR SOLUTION INTRAVENOUS at 05:40

## 2017-07-02 RX ADMIN — HEPARIN SODIUM 5000 UNIT(S): 5000 INJECTION INTRAVENOUS; SUBCUTANEOUS at 15:02

## 2017-07-02 RX ADMIN — Medication 40 MILLIGRAM(S): at 09:21

## 2017-07-02 RX ADMIN — HYDROMORPHONE HYDROCHLORIDE 1 MILLIGRAM(S): 2 INJECTION INTRAMUSCULAR; INTRAVENOUS; SUBCUTANEOUS at 10:22

## 2017-07-02 RX ADMIN — Medication 100 MILLIGRAM(S): at 05:41

## 2017-07-02 RX ADMIN — Medication 81 MILLIGRAM(S): at 12:06

## 2017-07-02 RX ADMIN — HEPARIN SODIUM 5000 UNIT(S): 5000 INJECTION INTRAVENOUS; SUBCUTANEOUS at 05:39

## 2017-07-02 RX ADMIN — Medication 1 APPLICATION(S): at 12:09

## 2017-07-02 RX ADMIN — Medication 50 MILLIGRAM(S): at 05:38

## 2017-07-02 RX ADMIN — HYDROMORPHONE HYDROCHLORIDE 2 MILLIGRAM(S): 2 INJECTION INTRAMUSCULAR; INTRAVENOUS; SUBCUTANEOUS at 17:10

## 2017-07-02 RX ADMIN — Medication 25 MILLIGRAM(S): at 05:39

## 2017-07-02 RX ADMIN — HYDROMORPHONE HYDROCHLORIDE 2 MILLIGRAM(S): 2 INJECTION INTRAMUSCULAR; INTRAVENOUS; SUBCUTANEOUS at 05:39

## 2017-07-02 RX ADMIN — PIPERACILLIN AND TAZOBACTAM 25 GRAM(S): 4; .5 INJECTION, POWDER, LYOPHILIZED, FOR SOLUTION INTRAVENOUS at 21:04

## 2017-07-02 RX ADMIN — HYDROMORPHONE HYDROCHLORIDE 1 MILLIGRAM(S): 2 INJECTION INTRAMUSCULAR; INTRAVENOUS; SUBCUTANEOUS at 15:01

## 2017-07-02 RX ADMIN — Medication 325 MILLIGRAM(S): at 16:28

## 2017-07-02 RX ADMIN — Medication 0.5 MILLIGRAM(S): at 11:00

## 2017-07-02 RX ADMIN — HEPARIN SODIUM 5000 UNIT(S): 5000 INJECTION INTRAVENOUS; SUBCUTANEOUS at 21:03

## 2017-07-02 RX ADMIN — Medication 25 MILLIGRAM(S): at 16:28

## 2017-07-02 RX ADMIN — PANTOPRAZOLE SODIUM 40 MILLIGRAM(S): 20 TABLET, DELAYED RELEASE ORAL at 08:11

## 2017-07-02 RX ADMIN — AMLODIPINE BESYLATE 5 MILLIGRAM(S): 2.5 TABLET ORAL at 05:38

## 2017-07-02 RX ADMIN — PRAMIPEXOLE DIHYDROCHLORIDE 0.12 MILLIGRAM(S): 0.12 TABLET ORAL at 12:06

## 2017-07-02 RX ADMIN — Medication 40 MILLIGRAM(S): at 16:29

## 2017-07-02 RX ADMIN — HYDROMORPHONE HYDROCHLORIDE 2 MILLIGRAM(S): 2 INJECTION INTRAMUSCULAR; INTRAVENOUS; SUBCUTANEOUS at 13:00

## 2017-07-02 RX ADMIN — ISOSORBIDE MONONITRATE 120 MILLIGRAM(S): 60 TABLET, EXTENDED RELEASE ORAL at 12:06

## 2017-07-02 RX ADMIN — Medication 325 MILLIGRAM(S): at 08:11

## 2017-07-02 RX ADMIN — Medication 145 MILLIGRAM(S): at 12:06

## 2017-07-02 RX ADMIN — HYDROMORPHONE HYDROCHLORIDE 2 MILLIGRAM(S): 2 INJECTION INTRAMUSCULAR; INTRAVENOUS; SUBCUTANEOUS at 06:09

## 2017-07-02 RX ADMIN — PIPERACILLIN AND TAZOBACTAM 25 GRAM(S): 4; .5 INJECTION, POWDER, LYOPHILIZED, FOR SOLUTION INTRAVENOUS at 15:00

## 2017-07-02 RX ADMIN — HYDROMORPHONE HYDROCHLORIDE 1 MILLIGRAM(S): 2 INJECTION INTRAMUSCULAR; INTRAVENOUS; SUBCUTANEOUS at 09:21

## 2017-07-02 NOTE — PROGRESS NOTE ADULT - SUBJECTIVE AND OBJECTIVE BOX
R AKA stump with slight staining medial aspect, otherwise intact    MEDICATIONS  (STANDING):  aspirin  chewable 81 milliGRAM(s) Oral daily  heparin  Injectable 5000 Unit(s) SubCutaneous every 8 hours  allopurinol 100 milliGRAM(s) Oral daily  doxazosin 8 milliGRAM(s) Oral at bedtime  fenofibrate Tablet 145 milliGRAM(s) Oral daily  ferrous    sulfate 325 milliGRAM(s) Oral two times a day with meals  gabapentin 300 milliGRAM(s) Oral daily  hydrALAZINE 50 milliGRAM(s) Oral daily  HYDROmorphone   Tablet 2 milliGRAM(s) Oral every 6 hours  isosorbide   mononitrate ER Tablet (IMDUR) 120 milliGRAM(s) Oral daily  pramipexole 0.125 milliGRAM(s) Oral daily  simvastatin 10 milliGRAM(s) Oral at bedtime  piperacillin/tazobactam IVPB. 3.375 Gram(s) IV Intermittent every 8 hours  ammonium lactate 12% Lotion 1 Application(s) Topical daily  buDESOnide   0.5 milliGRAM(s) Respule 0.5 milliGRAM(s) Inhalation two times a day  metoprolol 25 milliGRAM(s) Oral two times a day  vancomycin  IVPB 400 milliGRAM(s) IV Intermittent every 12 hours  amLODIPine   Tablet 5 milliGRAM(s) Oral daily  pantoprazole    Tablet 40 milliGRAM(s) Oral before breakfast    MEDICATIONS  (PRN):  HYDROmorphone  Injectable 1 milliGRAM(s) IV Push every 3 hours PRN Moderate Pain (4 - 6)  acetaminophen   Tablet. 500 milliGRAM(s) Oral every 6 hours PRN Mild Pain (1 - 3)  ALBUTerol    90 MICROgram(s) HFA Inhaler 2 Puff(s) Inhalation every 6 hours PRN Shortness of Breath  HYDROmorphone  Injectable 0.5 milliGRAM(s) IV Push every 6 hours PRN Severe Pain (7 - 10)      Allergies    No Known Allergies    Intolerances        Flatus: [ ] YES [ ] NO             Bowel Movement: [ ] YES [ ] NO  Pain (0-10):            Pain Control Adequate: [ ] YES [ ] NO  Nausea: [ ] YES [ ] NO            Vomiting: [ ] YES [ ] NO  Diarrhea: [ ] YES [ ] NO         Constipation: [ ] YES [ ] NO     Chest Pain: [ ] YES [ ] NO    SOB:  [ ] YES [ ] NO    Vital Signs Last 24 Hrs  T(C): 37 (02 Jul 2017 06:03), Max: 37 (02 Jul 2017 06:03)  T(F): 98.6 (02 Jul 2017 06:03), Max: 98.6 (02 Jul 2017 06:03)  HR: 88 (02 Jul 2017 09:35) (65 - 102)  BP: 123/72 (02 Jul 2017 09:35) (123/72 - 170/45)  BP(mean): 79 (02 Jul 2017 09:35) (62 - 91)  RR: 18 (02 Jul 2017 04:00) (14 - 22)  SpO2: 93% (01 Jul 2017 19:25) (93% - 93%)    I&O's Summary    01 Jul 2017 07:01  -  02 Jul 2017 07:00  --------------------------------------------------------  IN: 1410 mL / OUT: 555 mL / NET: 855 mL    02 Jul 2017 07:01  -  02 Jul 2017 09:57  --------------------------------------------------------  IN: 360 mL / OUT: 200 mL / NET: 160 mL        Physical Exam:  General: NAD, resting comfortably  Pulmonary: normal resp effort, CTA-B  Cardiovascular: NSR  Abdominal: soft, NT/ND  Extremities: WWP, normal strength  Neuro: A/O x 3, CNs II-XII grossly intact, normal motor/sensation, no focal deficits  Pulses:   Right:                                                                          Left:  L upper extremity edematous (PICC line in place)    LABS:                        8.7    6.4   )-----------( 226      ( 02 Jul 2017 04:39 )             27.4     07-02    146<H>  |  112<H>  |  37<H>  ----------------------------<  104<H>  3.3<L>   |  26  |  1.45<H>    Ca    7.9<L>      02 Jul 2017 08:59            CAPILLARY BLOOD GLUCOSE          RADIOLOGY & ADDITIONAL TESTS:

## 2017-07-02 NOTE — PROGRESS NOTE ADULT - SUBJECTIVE AND OBJECTIVE BOX
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 Patient is seen bed side, laying comfortably in bed, NAD, and AAOx3. Patient denies left foot/ankle pain. Pt denies N/V/F/C/SOB/CP/Diarrhea/headaches. Patient states he has been using the harness for his physical therapy.       PMH: CAD s/p stents (LAD, RCA bare metal around 9/16),PVD (RLE stent/ angioplasty and surgical debridment by Dr Siu 5/20 ) A.Fib not on anticoagulation due to GI bleeding, Hypertension, COPD on home O2 2L, SHAYY on nocturnal BIPAP, ex-smoker (smoked 1ppd X 50 years, quit 22 years ago),  Chronic diastolic CHF, Hyperlipidemia, PVD, Iron deficiency anemia, Chronic back pain, Gout, BPH, CKD III with baseline Cr 2.     MEDICATIONS  (STANDING):  aspirin  chewable 81 milliGRAM(s) Oral daily  heparin  Injectable 5000 Unit(s) SubCutaneous every 8 hours  allopurinol 100 milliGRAM(s) Oral daily  doxazosin 8 milliGRAM(s) Oral at bedtime  fenofibrate Tablet 145 milliGRAM(s) Oral daily  ferrous    sulfate 325 milliGRAM(s) Oral two times a day with meals  gabapentin 300 milliGRAM(s) Oral daily  hydrALAZINE 50 milliGRAM(s) Oral daily  HYDROmorphone   Tablet 2 milliGRAM(s) Oral every 6 hours  isosorbide   mononitrate ER Tablet (IMDUR) 120 milliGRAM(s) Oral daily  pramipexole 0.125 milliGRAM(s) Oral daily  simvastatin 10 milliGRAM(s) Oral at bedtime  piperacillin/tazobactam IVPB. 3.375 Gram(s) IV Intermittent every 8 hours  ammonium lactate 12% Lotion 1 Application(s) Topical daily  buDESOnide   0.5 milliGRAM(s) Respule 0.5 milliGRAM(s) Inhalation two times a day  metoprolol 25 milliGRAM(s) Oral two times a day  vancomycin  IVPB 400 milliGRAM(s) IV Intermittent every 12 hours  amLODIPine   Tablet 5 milliGRAM(s) Oral daily  pantoprazole    Tablet 40 milliGRAM(s) Oral before breakfast    MEDICATIONS  (PRN):  HYDROmorphone  Injectable 1 milliGRAM(s) IV Push every 3 hours PRN Moderate Pain (4 - 6)  acetaminophen   Tablet. 500 milliGRAM(s) Oral every 6 hours PRN Mild Pain (1 - 3)  ALBUTerol    90 MICROgram(s) HFA Inhaler 2 Puff(s) Inhalation every 6 hours PRN Shortness of Breath  HYDROmorphone  Injectable 0.5 milliGRAM(s) IV Push every 6 hours PRN Severe Pain (7 - 10)    Allergies: NKFDA    Vital Signs Last 24 Hrs  T(C): 37 (02 Jul 2017 06:03), Max: 37 (02 Jul 2017 06:03)  T(F): 98.6 (02 Jul 2017 06:03), Max: 98.6 (02 Jul 2017 06:03)  HR: 88 (02 Jul 2017 09:35) (65 - 102)  BP: 123/72 (02 Jul 2017 09:35) (123/72 - 170/45)  BP(mean): 79 (02 Jul 2017 09:35) (62 - 91)  RR: 18 (02 Jul 2017 04:00) (14 - 22)  SpO2: 93% (01 Jul 2017 19:25) (93% - 93%)      PHYSICAL EXAM:  Constitutional: NAD, AAO x3  Extremities: Right LE AKA; dressing noted to be clean, dry and intact.   Vascular: Left foot: DP and PT 1/4   Neuro: Protective sensation is decreased.  Derm: Preulcerative lesion noted on the postero left heel. No fluctuance, no open lesion, no active drainage, no acute clinical signs of infection, no mal-odor  MSK: No pain upon palpation of the left lower leg and foot.                               8.7    6.4   )-----------( 226      ( 02 Jul 2017 04:39 )             27.4     07-02    146<H>  |  112<H>  |  37<H>  ----------------------------<  104<H>  3.3<L>   |  26  |  1.45<H>    Ca    7.9<L>      02 Jul 2017 08:59          Blood Culture:   Culture - Other (06.17.17 @ 12:50)    Specimen Source: .Other right groin wound    Culture Results:   Moderate Mixed gram negative rods  Few Enterococcus species  Rare Corynebacterium species      Culture - Blood (06.17.17 @ 03:54)    Specimen Source: .Blood Blood    Culture Results:   No growth at 5 days.        RADIOLOGY/EKG:      EXAM:  IR PROCEDURE PICC                            PROCEDURE DATE:  06/30/2017        INTERPRETATION:  Clinical Information:  heel ulcer    Procedure: Left arm single lumen Power PICC line placement.    : Chucky. The attending was presentthe entire procedure.    Anesthesia: Local.    Contrast: None.    Complications: None.    Procedure technique:    After informed consent was obtained from the patient, the patient was   placed supine on the table in the angiography suite. Time out was taken.   Grayscale ultrasound of the left upper extremity was performed and a   basilic vein was found to be patent. The arm was prepped and draped in   usual sterile fashion. Under direct ultrasound visualization, the vein   was accessed with a 21-gauge needle. The needle was exchanged over a   0.018 inch wire for a 5 Chilean peel-away sheath. The wire was passed into   the SVC under fluoroscopic visualization. The PICC catheter was cut to 46   cm and advanced under fluoroscopic visualization. The peel-away sheath   was removed. Catheter was positioned with the tip in the SVC and flushed.   Catheter was secured into place and a sterile dressing was applied.   Patient tolerated the procedure well and left the department in stable   condition.    Findings:    Grayscale ultrasound imaging of the left arm demonstrates patent basilic   vein. Fluoroscopic images demonstrate wire followed by catheter access to   the SVC.    Impression:    Left upper extremity single lumen 4 Chilean 46 cm Power PICC placement.   Tip is in the SVC with good aspiration and flush. Catheter may be used   immediately.

## 2017-07-02 NOTE — PROGRESS NOTE ADULT - SUBJECTIVE AND OBJECTIVE BOX
Left upper extremity found to have nonocclusive thrombus of the medial cubital vein as discussed with Dr. Cullen, radiologist.      A/P  superficial nonocclusive thrombus  -Will elevate limb and apply cool compresses as d/w Dr. Hernandez.  -FARHAD Salas notified

## 2017-07-02 NOTE — PROGRESS NOTE ADULT - ASSESSMENT
82 year old male with history of CAD s/p stents (LAD, RCA bare metal around 9/16),PVD (RLE stent/ angioplasty and surgical debridement by Dr Siu 5/20 ) A.Fib not on anticoagulation due to GI bleeding, Hypertension, COPD on home O2 2L, SHAYY on nocturnal BIPAP, Chronic diastolic CHF, Hyperlipidemia, PVD, Iron deficiency anemia, Chronic back pain, Gout, BPH, CKD III with baseline Cr 2.   Pt was admitted on 6/17 due to anemia 2/2 GI bleed for pRBC transfusion. Pt underwent R AKA for progressive nonhealing RLE ulcers; POD #5. Had a pRBC transfusion 7/1 2/2 to Hgb of 7.7. Today, has enlarged scrotum to the size of grape fruit and swelling in LUE positive for superficial nonocclusive thrombus. Has diarrhea that started on 6/30 but was attributed to senna intake; but diarrhea still persists.

## 2017-07-02 NOTE — PROGRESS NOTE ADULT - ASSESSMENT
82 year old male patient s/p AKA RLE (6/27/17) is seen and evaluated for:      1. Pre-ulcerative lesion Left plantar heel; No clinical signs of infection noted  2. Onychomycosis, left foot  3. Other issues PVD; s/p  AKA      P:  Pt was seen and examined. Plan was discussed with attending Dr. Fermin.  Cavillon applied to preulcerative lesion on left heel and Z-flex boot reapplied.  Toenails are trimmed; post debridement (6/30/17).  Continue use of Z-flex boots while in bed  Recommend IV abx per ID, appreciated. PICC line noted to be inserted for long term IVabx for treatment of groin wound.  Care per Medicine and Vascular surgery appreciated.  Podiatry to follow while in house

## 2017-07-02 NOTE — PROGRESS NOTE ADULT - ASSESSMENT
NEPHROLOGY INTERVAL HPI/OVERNIGHT EVENTS:  83 y/o wm with hx of CKD stage 3 ( scr in low 2's in past)  presents from [facility] with anemia with hgb of 7.7..      During course of hospitalization pt transfused and EGD was held due to his high risk.    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    PICC line placed for tx of groin revealing GNR with ENspp and corynebacteria  on Vanc and Zosyn with total of 6 weeks of abx planned.      Now with variable renal function in setting of higher vanc trough with Na values in 148.  Trial of IVF given and started this AM    7/1  doing well  more awake alert  Na better  pt has chronic hypernatremia    7/2  Hypokalemia noted along w edema of the stump, scrotum  Pt is uncomfortable  Got one dose of lasix earlier, will repeat dose in 6 hours  Kcl 40 meq po x 1

## 2017-07-02 NOTE — PROGRESS NOTE ADULT - SUBJECTIVE AND OBJECTIVE BOX
NEPHROLOGY INTERVAL HPI/OVERNIGHT EVENTS:  81 y/o wm with hx of CKD stage 3 ( scr in low 2's in past)  presents from [facility] with anemia with hgb of 7.7..      During course of hospitalization pt transfused and EGD was held due to his high risk.    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    PICC line placed for tx of groin revealing GNR with ENspp and corynebacteria  on Vanc and Zosyn with total of 6 weeks of abx planned.      Now with variable renal function in setting of higher vanc trough with Na values in 148.  Trial of IVF given and started this AM    7/1  doing well  more awake alert  Na better  pt has chronic hypernatremia          PAST MEDICAL & SURGICAL HISTORY:  - aniceto on cpap   - CKD stage 3 ( scr 2)  - DM  - GI bleed  - BPH s/p green light procedure  -gout  - HTN  - cad s/p pci  -COPD  - spincal stenosis  - HLD  - GERD  - PAD   - s/p rotator cuff tear      FAMILY HISTORY:  No pertinent family history in first degree relatives      MEDICATIONS  (STANDING):  aspirin  chewable 81 milliGRAM(s) Oral daily  heparin  Injectable 5000 Unit(s) SubCutaneous every 8 hours  allopurinol 100 milliGRAM(s) Oral daily  doxazosin 8 milliGRAM(s) Oral at bedtime  fenofibrate Tablet 145 milliGRAM(s) Oral daily  ferrous    sulfate 325 milliGRAM(s) Oral two times a day with meals  gabapentin 300 milliGRAM(s) Oral daily  hydrALAZINE 50 milliGRAM(s) Oral daily  HYDROmorphone   Tablet 2 milliGRAM(s) Oral every 6 hours  isosorbide   mononitrate ER Tablet (IMDUR) 120 milliGRAM(s) Oral daily  pramipexole 0.125 milliGRAM(s) Oral daily  simvastatin 10 milliGRAM(s) Oral at bedtime  piperacillin/tazobactam IVPB. 3.375 Gram(s) IV Intermittent every 8 hours  ammonium lactate 12% Lotion 1 Application(s) Topical daily  buDESOnide   0.5 milliGRAM(s) Respule 0.5 milliGRAM(s) Inhalation two times a day  metoprolol 25 milliGRAM(s) Oral two times a day  amLODIPine   Tablet 5 milliGRAM(s) Oral daily  pantoprazole    Tablet 40 milliGRAM(s) Oral before breakfast  furosemide   Injectable 40 milliGRAM(s) IV Push once    MEDICATIONS  (PRN):  HYDROmorphone  Injectable 1 milliGRAM(s) IV Push every 3 hours PRN Moderate Pain (4 - 6)  acetaminophen   Tablet. 500 milliGRAM(s) Oral every 6 hours PRN Mild Pain (1 - 3)  ALBUTerol    90 MICROgram(s) HFA Inhaler 2 Puff(s) Inhalation every 6 hours PRN Shortness of Breath  HYDROmorphone  Injectable 0.5 milliGRAM(s) IV Push every 6 hours PRN Severe Pain (7 - 10)        Allergies    No Known Allergies    Intolerances                 Vital Signs Last 24 Hrs  T(C): 37 (02 Jul 2017 06:03), Max: 37 (02 Jul 2017 06:03)  T(F): 98.6 (02 Jul 2017 06:03), Max: 98.6 (02 Jul 2017 06:03)  HR: 93 (02 Jul 2017 12:00) (65 - 102)  BP: 163/48 (02 Jul 2017 12:00) (123/72 - 170/45)  BP(mean): 79 (02 Jul 2017 12:00) (66 - 91)  RR: 23 (02 Jul 2017 12:00) (14 - 23)  SpO2: 93% (01 Jul 2017 19:25) (93% - 93%)    I&O's Summary    01 Jul 2017 07:01  -  02 Jul 2017 07:00  --------------------------------------------------------  IN: 1410 mL / OUT: 555 mL / NET: 855 mL    02 Jul 2017 07:01  -  02 Jul 2017 13:08  --------------------------------------------------------  IN: 360 mL / OUT: 300 mL / NET: 60 mL          PHYSICAL EXAM:  GEN: alert awake O X 3  HEENT: MMM  NECK supple no jvd  CV: RRR s1s2  LUNGS: b/l CTA  ABD: + soft,   EXT: left 1+ edema  right AKA, edema  ++ scrotal edema

## 2017-07-02 NOTE — PROGRESS NOTE ADULT - PROBLEM SELECTOR PLAN 2
- s/p R AKA POD #5  - Hold IV Vanco tonight as vanc trough =20.3 today  - Repeat vanc trough tomorrow morning  - Continue IV zosyn day #15  - no fever  - wound vac dressing changed on Friday 6/30

## 2017-07-02 NOTE — PROGRESS NOTE ADULT - PROBLEM SELECTOR PLAN 1
- 1 unit of pRBC transfusion yesterday 2/2 anemia of 7.7  - Current Hg= 8.7  - Hemodynamically stable  - Serial CBCs

## 2017-07-02 NOTE — PROGRESS NOTE ADULT - SUBJECTIVE AND OBJECTIVE BOX
Pt has been seen and examined with FP resident, resident supervised agree with a/p       Patient is a 82y old  Male who presents with a chief complaint of Sent from [facility] rehab because of low hemoglobin 7.7 (17 Jun 2017 00:37)        HPI:  82 year old male with history of CAD s/p stents (LAD, RCA bare metal around 9/16),PVD (RLE stent/ angioplasty and surgical debridment by Dr Sui 5/20 ) A.Fib not on anticoagulation due to GI bleeding, Hypertension, COPD on home O2 2L, SHAYY on nocturnal BIPAP, ex-smoker (smoked 1ppd X 50 years, quit 22 years ago),  Chronic diastolic CHF, Hyperlipidemia, PVD, Iron deficiency anemia, Chronic back pain, Gout, BPH, CKD III with baseline Cr 2. Was ecently admitted to  on  4/17 with anemia of GI bleeding, RLE and RUE DVTs,( received pRBCs and IVC filter discharged to rehab with aspirin) was readmitted to   ER on 5/4/17 for worsening anemia with Hb 6, worsening leg pain from ulcers and worsening renal function Cr 3.99 now presents from [facility] with anemia (7.7 on labs today decreasing from 9.5 over past few weeks). Pt c/o gradual onset  progressive fatigue over the last couple of weeks. Patient is unable to ambulate because of his leg cellulitis and ulcers. He denies any shortness of breath, palpitations / chest pain / light headedness. According to the daughter the attendants at [facility] did notice dark stools for the last couple of days. Patient denies any abdominal pain or change in bowel or urinary habits.  In ED, + guaiac. (17 Jun 2017 00:37)        PHYSICAL EXAM:  Vital Signs Last 24 Hrs  T(C): 37 (02 Jul 2017 06:03), Max: 37 (02 Jul 2017 06:03)  T(F): 98.6 (02 Jul 2017 06:03), Max: 98.6 (02 Jul 2017 06:03)  HR: 93 (02 Jul 2017 12:00) (65 - 102)  BP: 163/48 (02 Jul 2017 12:00) (123/72 - 170/45)  BP(mean): 79 (02 Jul 2017 12:00) (62 - 91)  RR: 23 (02 Jul 2017 12:00) (14 - 23)  SpO2: 93% (01 Jul 2017 19:25) (93% - 93%)  SpO2: --  general- comfortable   -rs-aeeb,cta  -cvs-s1s2 normal   -p/a-soft,bs+   -extremity- right leg s/p AKA, groin wound present  -cns- non focal   -groin- scrotal swelling noted- no tenderness noted       A/P    #Anemia-Anemia due to chronic disease or gi bleed     #Acute anemia on anemia due to chronic disease   -s/p prbc infusion      #fever- possibly from wound infection -groin wound currently active   -ct abx, need longer iv abx     #DVT pr-heparin    #hypernatremia- nephrology evaluation for renal failure and hypernatremia     #ARF-hold off on iv fluids     #Scrotal swelling- US study, lasix as tolerated, hold off on iv fluids     #above discussed with pt and all questions have been answered

## 2017-07-02 NOTE — PROGRESS NOTE ADULT - SUBJECTIVE AND OBJECTIVE BOX
HPI:  82 year old male with history of CAD s/p stents (LAD, RCA bare metal around 9/16),PVD (RLE stent/ angioplasty and surgical debridement by Dr Siu 5/20 ) A.Paul not on anticoagulation due to GI bleeding, Hypertension, COPD on home O2 2L, SHAYY on nocturnal BIPAP, ex-smoker (smoked 1ppd X 50 years, quit 22 years ago),  Chronic diastolic CHF, Hyperlipidemia, PVD, Iron deficiency anemia, Chronic back pain, Gout, BPH, CKD III with baseline Cr 2. Was recently admitted to  on  4/17 with anemia of GI bleeding, RLE and RUE DVTs,( received pRBCs and IVC filter discharged to rehab with aspirin) was readmitted to   ER on 5/4/17 for worsening anemia with Hb 6, worsening leg pain from ulcers and worsening renal function Cr 3.99 now presents from [facility] with anemia (7.7 on labs today decreasing from 9.5 over past few weeks). Pt c/o gradual onset  progressive fatigue over the last couple of weeks. Patient is unable to ambulate because of his leg cellulitis and ulcers. He denies any shortness of breath, palpitations / chest pain / light headedness. According to the daughter the attendants at [facility] did notice dark stools for the last couple of days. Patient denies any abdominal pain or change in bowel or urinary habits.  In ED, + guaiac. (17 Jun 2017 00:37)    7/2: Pt reports substantial swelling of scrotum to the point of not being to see his penis when he had urinate this morning. Denies scrotal pain or pain with urination. Reports diarrhea since Friday, and he reports swelling of his L upper extremity but he's more concerned about his scrotal swelling.  Hemodynamically stable.    MEDICATIONS  (STANDING):  aspirin  chewable 81 milliGRAM(s) Oral daily  heparin  Injectable 5000 Unit(s) SubCutaneous every 8 hours  allopurinol 100 milliGRAM(s) Oral daily  doxazosin 8 milliGRAM(s) Oral at bedtime  fenofibrate Tablet 145 milliGRAM(s) Oral daily  ferrous    sulfate 325 milliGRAM(s) Oral two times a day with meals  gabapentin 300 milliGRAM(s) Oral daily  hydrALAZINE 50 milliGRAM(s) Oral daily  HYDROmorphone   Tablet 2 milliGRAM(s) Oral every 6 hours  isosorbide   mononitrate ER Tablet (IMDUR) 120 milliGRAM(s) Oral daily  pramipexole 0.125 milliGRAM(s) Oral daily  simvastatin 10 milliGRAM(s) Oral at bedtime  piperacillin/tazobactam IVPB. 3.375 Gram(s) IV Intermittent every 8 hours  ammonium lactate 12% Lotion 1 Application(s) Topical daily  buDESOnide   0.5 milliGRAM(s) Respule 0.5 milliGRAM(s) Inhalation two times a day  metoprolol 25 milliGRAM(s) Oral two times a day  amLODIPine   Tablet 5 milliGRAM(s) Oral daily  pantoprazole    Tablet 40 milliGRAM(s) Oral before breakfast  furosemide   Injectable 40 milliGRAM(s) IV Push once    MEDICATIONS  (PRN):  HYDROmorphone  Injectable 1 milliGRAM(s) IV Push every 3 hours PRN Moderate Pain (4 - 6)  acetaminophen   Tablet. 500 milliGRAM(s) Oral every 6 hours PRN Mild Pain (1 - 3)  ALBUTerol    90 MICROgram(s) HFA Inhaler 2 Puff(s) Inhalation every 6 hours PRN Shortness of Breath  HYDROmorphone  Injectable 0.5 milliGRAM(s) IV Push every 6 hours PRN Severe Pain (7 - 10)     Objective    Vital Signs Last 24 Hrs  T(C): 37.4 (02 Jul 2017 15:51), Max: 37.4 (02 Jul 2017 15:51)  T(F): 99.4 (02 Jul 2017 15:51), Max: 99.4 (02 Jul 2017 15:51)  HR: 78 (02 Jul 2017 16:00) (65 - 102)  BP: 143/46 (02 Jul 2017 16:00) (123/72 - 170/45)  BP(mean): 69 (02 Jul 2017 16:00) (69 - 91)  ABP: --  ABP(mean): --  RR: 17 (02 Jul 2017 16:00) (16 - 23)  SpO2: 93% (01 Jul 2017 19:25) (93% - 93%)    GEN: NAD, comfortable, very pleasant gentleman  CV:  S1S2, RRR, No murmur  RESP:  CTABL, good air movement, no rales, rhonchi, or wheezing  ABD: Soft, NT, ND, +BS, no guarding, no rigidity  EXT:  +2 radial and pedal pulses, 2+L pedal edema;2+ edema of R thigh; Right  AKA stump healing well with intact and viable flaps and no evidence of infection  : swollen BL scrotum to the size of a grapefruit, nontender but sensitive to touch, no "bag of worms" feeling, feels fluid-filled, not warm to touch, no erythematous  Skin: R groin wound vac in place and functional      Labs                     8.7    6.4   )-----------( 226      ( 02 Jul 2017 04:39 )             27.4     02 Jul 2017 08:59    146    |  112    |  37     ----------------------------<  104    3.3     |  26     |  1.45     Ca    7.9        02 Jul 2017 08:59                          8.7    6.4   )-----------( 226      ( 02 Jul 2017 04:39 )             27.4     02 Jul 2017 08:59    146    |  112    |  37     ----------------------------<  104    3.3     |  26     |  1.45     Ca    7.9        02 Jul 2017 08:59      Imaging    EXAM:  US SCROTUM AND CONTENTS                        PROCEDURE DATE:  07/02/2017    IMPRESSION:     1.   Right hemiscrotum:  Normal testis. Extensive scrotal edema.  2.   Left hemiscrotum:  Normal testis. Extensive scrotal edema.        US Duplex Venous Upper Ext Ltd, Left  EXAM:  US DPLX UPR EXT VEINS LTD LT                        PROCEDURE DATE:  07/02/2017 IMPRESSION: Focal nonocclusive thrombus within the median cubital vein.

## 2017-07-02 NOTE — PROGRESS NOTE ADULT - PROBLEM SELECTOR PLAN 7
- isosorbide mononitrate 120 mg Oral daily  - Continue Simvastatin  10mg  - Continue Aspirin 81mg daily  - Continue Fenofibrate 145mg

## 2017-07-02 NOTE — PROGRESS NOTE ADULT - PROBLEM SELECTOR PLAN 5
- Currently on vancomycin and have been experiencing diarrhea since Friday 6/30  - Stool sample sent for C Diff -pending results  - No fever

## 2017-07-02 NOTE — PROGRESS NOTE ADULT - PROBLEM SELECTOR PLAN 3
- Edema of L UE  - U/S of L UE reveals  - Communicated to nurse by Dr. Beharry.  - Cold compresses of affected area

## 2017-07-02 NOTE — PROGRESS NOTE ADULT - PROBLEM SELECTOR PLAN 4
- scrotal swelling to the size of grape fruits, has 2+ LE edema but lungs are clear. Still concerned for fluid overload.  - Scrotal ultrasound benign    - Given 1x Lasix 40mg

## 2017-07-02 NOTE — PROGRESS NOTE ADULT - PROBLEM SELECTOR PLAN 6
- Continue Amlodipine 5mg  - Continue Doxazosin 8mg  - Continue Metoprolol 25mg  - hydralazine 50 mg Oral daily

## 2017-07-03 LAB
ANION GAP SERPL CALC-SCNC: 8 MMOL/L — SIGNIFICANT CHANGE UP (ref 5–17)
BUN SERPL-MCNC: 41 MG/DL — HIGH (ref 7–23)
CALCIUM SERPL-MCNC: 8.2 MG/DL — LOW (ref 8.5–10.1)
CHLORIDE SERPL-SCNC: 113 MMOL/L — HIGH (ref 96–108)
CO2 SERPL-SCNC: 26 MMOL/L — SIGNIFICANT CHANGE UP (ref 22–31)
CREAT SERPL-MCNC: 1.55 MG/DL — HIGH (ref 0.5–1.3)
GLUCOSE SERPL-MCNC: 99 MG/DL — SIGNIFICANT CHANGE UP (ref 70–99)
HCT VFR BLD CALC: 28.2 % — LOW (ref 39–50)
HGB BLD-MCNC: 9 G/DL — LOW (ref 13–17)
MCHC RBC-ENTMCNC: 28.4 PG — SIGNIFICANT CHANGE UP (ref 27–34)
MCHC RBC-ENTMCNC: 32 GM/DL — SIGNIFICANT CHANGE UP (ref 32–36)
MCV RBC AUTO: 88.8 FL — SIGNIFICANT CHANGE UP (ref 80–100)
PLATELET # BLD AUTO: 226 K/UL — SIGNIFICANT CHANGE UP (ref 150–400)
POTASSIUM SERPL-MCNC: 3.8 MMOL/L — SIGNIFICANT CHANGE UP (ref 3.5–5.3)
POTASSIUM SERPL-SCNC: 3.8 MMOL/L — SIGNIFICANT CHANGE UP (ref 3.5–5.3)
RBC # BLD: 3.18 M/UL — LOW (ref 4.2–5.8)
RBC # FLD: 16.8 % — HIGH (ref 10.3–14.5)
SODIUM SERPL-SCNC: 147 MMOL/L — HIGH (ref 135–145)
VANCOMYCIN TROUGH SERPL-MCNC: 18.1 UG/ML — SIGNIFICANT CHANGE UP (ref 10–20)
WBC # BLD: 5.8 K/UL — SIGNIFICANT CHANGE UP (ref 3.8–10.5)
WBC # FLD AUTO: 5.8 K/UL — SIGNIFICANT CHANGE UP (ref 3.8–10.5)

## 2017-07-03 RX ORDER — HYDROMORPHONE HYDROCHLORIDE 2 MG/ML
1 INJECTION INTRAMUSCULAR; INTRAVENOUS; SUBCUTANEOUS THREE TIMES A DAY
Qty: 0 | Refills: 0 | Status: DISCONTINUED | OUTPATIENT
Start: 2017-07-03 | End: 2017-07-06

## 2017-07-03 RX ORDER — VANCOMYCIN HCL 1 G
400 VIAL (EA) INTRAVENOUS EVERY 12 HOURS
Qty: 0 | Refills: 0 | Status: DISCONTINUED | OUTPATIENT
Start: 2017-07-03 | End: 2017-07-08

## 2017-07-03 RX ORDER — FUROSEMIDE 40 MG
40 TABLET ORAL
Qty: 0 | Refills: 0 | Status: DISCONTINUED | OUTPATIENT
Start: 2017-07-03 | End: 2017-07-07

## 2017-07-03 RX ADMIN — Medication 40 MILLIGRAM(S): at 05:59

## 2017-07-03 RX ADMIN — Medication 325 MILLIGRAM(S): at 17:46

## 2017-07-03 RX ADMIN — PIPERACILLIN AND TAZOBACTAM 25 GRAM(S): 4; .5 INJECTION, POWDER, LYOPHILIZED, FOR SOLUTION INTRAVENOUS at 21:34

## 2017-07-03 RX ADMIN — HEPARIN SODIUM 5000 UNIT(S): 5000 INJECTION INTRAVENOUS; SUBCUTANEOUS at 21:34

## 2017-07-03 RX ADMIN — Medication 40 MILLIGRAM(S): at 17:46

## 2017-07-03 RX ADMIN — SIMVASTATIN 10 MILLIGRAM(S): 20 TABLET, FILM COATED ORAL at 21:33

## 2017-07-03 RX ADMIN — HEPARIN SODIUM 5000 UNIT(S): 5000 INJECTION INTRAVENOUS; SUBCUTANEOUS at 14:52

## 2017-07-03 RX ADMIN — Medication 50 MILLIGRAM(S): at 05:59

## 2017-07-03 RX ADMIN — Medication 25 MILLIGRAM(S): at 17:46

## 2017-07-03 RX ADMIN — Medication 145 MILLIGRAM(S): at 12:21

## 2017-07-03 RX ADMIN — ISOSORBIDE MONONITRATE 120 MILLIGRAM(S): 60 TABLET, EXTENDED RELEASE ORAL at 12:31

## 2017-07-03 RX ADMIN — Medication 0.5 MILLIGRAM(S): at 08:59

## 2017-07-03 RX ADMIN — Medication 81 MILLIGRAM(S): at 08:05

## 2017-07-03 RX ADMIN — HYDROMORPHONE HYDROCHLORIDE 2 MILLIGRAM(S): 2 INJECTION INTRAMUSCULAR; INTRAVENOUS; SUBCUTANEOUS at 12:21

## 2017-07-03 RX ADMIN — PIPERACILLIN AND TAZOBACTAM 25 GRAM(S): 4; .5 INJECTION, POWDER, LYOPHILIZED, FOR SOLUTION INTRAVENOUS at 05:59

## 2017-07-03 RX ADMIN — HEPARIN SODIUM 5000 UNIT(S): 5000 INJECTION INTRAVENOUS; SUBCUTANEOUS at 05:58

## 2017-07-03 RX ADMIN — HYDROMORPHONE HYDROCHLORIDE 2 MILLIGRAM(S): 2 INJECTION INTRAMUSCULAR; INTRAVENOUS; SUBCUTANEOUS at 00:05

## 2017-07-03 RX ADMIN — Medication 100 MILLIGRAM(S): at 17:47

## 2017-07-03 RX ADMIN — PRAMIPEXOLE DIHYDROCHLORIDE 0.12 MILLIGRAM(S): 0.12 TABLET ORAL at 12:21

## 2017-07-03 RX ADMIN — Medication 0.5 MILLIGRAM(S): at 19:13

## 2017-07-03 RX ADMIN — PIPERACILLIN AND TAZOBACTAM 25 GRAM(S): 4; .5 INJECTION, POWDER, LYOPHILIZED, FOR SOLUTION INTRAVENOUS at 14:52

## 2017-07-03 RX ADMIN — Medication 325 MILLIGRAM(S): at 08:05

## 2017-07-03 RX ADMIN — HYDROMORPHONE HYDROCHLORIDE 2 MILLIGRAM(S): 2 INJECTION INTRAMUSCULAR; INTRAVENOUS; SUBCUTANEOUS at 05:59

## 2017-07-03 RX ADMIN — Medication 25 MILLIGRAM(S): at 05:59

## 2017-07-03 RX ADMIN — AMLODIPINE BESYLATE 5 MILLIGRAM(S): 2.5 TABLET ORAL at 05:59

## 2017-07-03 RX ADMIN — GABAPENTIN 300 MILLIGRAM(S): 400 CAPSULE ORAL at 08:07

## 2017-07-03 RX ADMIN — Medication 1 APPLICATION(S): at 12:22

## 2017-07-03 RX ADMIN — PANTOPRAZOLE SODIUM 40 MILLIGRAM(S): 20 TABLET, DELAYED RELEASE ORAL at 08:05

## 2017-07-03 RX ADMIN — Medication 100 MILLIGRAM(S): at 08:06

## 2017-07-03 RX ADMIN — Medication 8 MILLIGRAM(S): at 21:34

## 2017-07-03 NOTE — PROGRESS NOTE ADULT - PROBLEM SELECTOR PLAN 2
- s/p R AKA POD #6  - vanc trough =18.1 today  - Continue Vanc  - no fever  - wound vac dressing changed on Friday 7/3  - Awaiting placement at [place] for rehab - s/p R AKA POD #7  - Continue Vanc  - no fever  - wound vac dressing changed on Friday 7/3  - Awaiting placement at [place] for rehab - s/p R AKA POD #8  - no fever  - Appreciate infectious disease consult, Dr. Burleson:        - wound vac dressing on inguinal open wound changed on 7/5       - inguinal wound shows mixed GNR, EN spp and corynebacterium spp       - Continue vancomycin 500 mg IV q12h and zosyn 3.375 gm IV q8h day 18 for 6           weeks

## 2017-07-03 NOTE — PROGRESS NOTE ADULT - SUBJECTIVE AND OBJECTIVE BOX
Patient is a 82y old  Male who presents with a chief complaint of [facility] because of low hemoglobin 7.7 (2017 00:37)    HPI:  83 y/o male with h/o CAD s/p stents (LAD, RCA bare metal around ), PVD (RLE stent/ angioplasty) complicated with poorly healing right inguinal wound s/p prior surgical debridment (by Dr Siu  ) A.Fib not on anticoagulation due to GI bleeding, Hypertension, COPD on home O2 2L, SHAYY on nocturnal BIPAP, Chronic diastolic CHF, Hyperlipidemia, PVD, Iron deficiency anemia, Chronic back pain, Gout, BPH, CKD III with baseline Cr 2, 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 He has gradual onset  progressive fatigue over the last couple of weeks. Patient is unable to ambulate because of his leg ulcers. In ER, he was started on vancomycin IV and zosyn.    s/p right leg AKA  Lying in bed in NAD  Denies pain  Alert    MEDICATIONS  (STANDING):  aspirin  chewable 81 milliGRAM(s) Oral daily  heparin  Injectable 5000 Unit(s) SubCutaneous every 8 hours  allopurinol 100 milliGRAM(s) Oral daily  doxazosin 8 milliGRAM(s) Oral at bedtime  fenofibrate Tablet 145 milliGRAM(s) Oral daily  ferrous    sulfate 325 milliGRAM(s) Oral two times a day with meals  gabapentin 300 milliGRAM(s) Oral daily  hydrALAZINE 50 milliGRAM(s) Oral daily  HYDROmorphone   Tablet 2 milliGRAM(s) Oral every 6 hours  isosorbide   mononitrate ER Tablet (IMDUR) 120 milliGRAM(s) Oral daily  pramipexole 0.125 milliGRAM(s) Oral daily  simvastatin 10 milliGRAM(s) Oral at bedtime  piperacillin/tazobactam IVPB. 3.375 Gram(s) IV Intermittent every 8 hours  ammonium lactate 12% Lotion 1 Application(s) Topical daily  buDESOnide   0.5 milliGRAM(s) Respule 0.5 milliGRAM(s) Inhalation two times a day  metoprolol 25 milliGRAM(s) Oral two times a day  amLODIPine   Tablet 5 milliGRAM(s) Oral daily  pantoprazole    Tablet 40 milliGRAM(s) Oral before breakfast  vancomycin  IVPB 400 milliGRAM(s) IV Intermittent every 12 hours    MEDICATIONS  (PRN):  HYDROmorphone  Injectable 1 milliGRAM(s) IV Push every 3 hours PRN Moderate Pain (4 - 6)  acetaminophen   Tablet. 500 milliGRAM(s) Oral every 6 hours PRN Mild Pain (1 - 3)  ALBUTerol    90 MICROgram(s) HFA Inhaler 2 Puff(s) Inhalation every 6 hours PRN Shortness of Breath  HYDROmorphone  Injectable 0.5 milliGRAM(s) IV Push every 6 hours PRN Severe Pain (7 - 10)      Vital Signs Last 24 Hrs  T(C): 37 (2017 04:00), Max: 37.4 (2017 15:51)  T(F): 98.6 (2017 04:00), Max: 99.4 (2017 15:51)  HR: 80 (2017 09:00) (69 - 85)  BP: 178/53 (2017 06:00) (137/34 - 178/53)  BP(mean): 86 (2017 06:00) (62 - 86)  RR: 15 (2017 08:00) (15 - 21)  SpO2: 96% (2017 08:00) (96% - 96%)    Physical Exam:        Constitutional: frail looking  HEENT: NC/AT, EOMI, PERRLA  Neck: supple  Back: no tenderness  Respiratory: decreased BS at bases  Cardiovascular: S1S2 regular, no murmurs  Abdomen: soft, not tender, not distended, positive BS  Genitourinary: right inguinal open wound packed  Rectal: deferred  Musculoskeletal: no muscle tenderness, no joint swelling or tenderness  Extremities: 1+ pedal edema; right inguinal open wound - deep; right AKA  Neurological: AxOx3, moving all extremities, no focal deficits  Skin: no rashes    Labs:                        9.0    5.8   )-----------( 226      ( 2017 07:14 )             28.2     07-03    147<H>  |  113<H>  |  41<H>  ----------------------------<  99  3.8   |  26  |  1.55<H>    Ca    8.2<L>      2017 07:14         Vancomycin Level, Trough: 18.1 ug/mL ( @ 07:14)  Vancomycin Level, Trough: 20.3 ug/mL ( @ 04:39)      Cultures:                         8.0    5.9   )-----------( 227      ( 2017 05:45 )             24.8     06    148<H>  |  114<H>  |  35<H>  ----------------------------<  121<H>  3.6   |  26  |  1.38<H>    Ca    7.8<L>      2017 05:45         Vancomycin Level, Trough: 20.1 ug/mL ( @ 05:22)      Cultures:                         8.7    8.9   )-----------( 254      ( 2017 06:04 )             27.0     -    143  |  x   |  x   ----------------------------<  x   x    |  x   |  x     Ca    7.9<L>      2017 06:04         Vancomycin Level, Trough: 20.1 ug/mL ( @ 05:22)                          8.7    8.9   )-----------( 254      ( 2017 06:04 )             27.0     06-    150<H>  |  115<H>  |  32<H>  ----------------------------<  69<L>  3.8   |  22  |  1.40<H>    Ca    7.9<L>      2017 06:04                          8.6    7.2   )-----------( 284      ( 2017 07:16 )             26.6     06-    145  |  112<H>  |  30<H>  ----------------------------<  98  3.9   |  27  |  1.25    Ca    7.8<L>      2017 07:16                          10.2   8.0   )-----------( 366      ( 2017 06:22 )             32.7     06-    148<H>  |  115<H>  |  29<H>  ----------------------------<  133<H>  3.8   |  23  |  1.29    Ca    7.7<L>      2017 06:22                          8.3    6.5   )-----------( 367      ( 2017 06:25 )             25.6     06-20    146<H>  |  113<H>  |  32<H>  ----------------------------<  118<H>  3.2<L>   |  27  |  1.33<H>    Ca    7.9<L>      2017 06:25         Vancomycin Level, Trough: 14.2 ug/mL ( @ 05:00)      Cultures:                       8.1    7.0   )-----------( 331      ( 2017 05:00 )             25.2         151<H>  |  116<H>  |  40<H>  ----------------------------<  97  3.6   |  27  |  1.38<H>    Ca    8.1<L>      2017 05:00         Vancomycin Level, Trough: 14.2 ug/mL ( @ 05:00)                          8.2    8.5   )-----------( 312      ( 2017 03:54 )             25.0         148<H>  |  114<H>  |  53<H>  ----------------------------<  129<H>  3.8   |  28  |  1.65<H>    Ca    8.2<L>      2017 03:54  Phos  1.9     -17  Mg     2.1     -    TPro  5.2<L>  /  Alb  1.7<L>  /  TBili  0.3  /  DBili  x   /  AST  20  /  ALT  12  /  AlkPhos  57  06-17     LIVER FUNCTIONS - ( 2017 03:54 )  Alb: 1.7 g/dL / Pro: 5.2 gm/dL / ALK PHOS: 57 U/L / ALT: 12 U/L / AST: 20 U/L / GGT: x           Urinalysis Basic - ( 2017 10:36 )    Color: Yellow / Appearance: Clear / S.010 / pH: x  Gluc: x / Ketone: Negative  / Bili: Negative / Urobili: Negative mg/dL   Blood: x / Protein: 15 mg/dL / Nitrite: Negative   Culture - Other (17 @ 12:50)    Specimen Source: .Other right groin wound    Culture Results:   Moderate Mixed gram negative rods  Few Enterococcus species  Rare Corynebacterium species    Leuk Esterase: Trace / RBC: x / WBC 0-2   Sq Epi: x / Non Sq Epi: x / Bacteria: x    Culture - Blood (17 @ 03:54)    Specimen Source: .Blood Blood    Culture Results:   No growth to date.    Culture - Other (17 @ 12:50)    Specimen Source: .Other right groin wound    Culture Results:   Moderate Mixed gram negative rods  Few Enterococcus species          Radiology:    Right foot MRI:  1.  Large skin ulcer at the posterior aspect of the heel.  2.  Fluid tracking from the skin ulcer towards the medial calcaneus   suspicious for small abscess.  3.  Osteomyelitis of the posterior calcaneus.  4.  Osteonecrosis within the posterior calcaneus.  5.  Partial tearing of the lateral Achilles insertion.  6.  Full-thickness tearing of the lateral cord of the plantar fascia with   partial tear of the central cord.    Advanced directives addressed: full resuscitation

## 2017-07-03 NOTE — ADVANCED PRACTICE NURSE CONSULT - ASSESSMENT
This is an 82 year old male that was admitted to the hospital on 6/16/2017 for GI bleed and anemia. 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    In to change wound vac dressing to Right inguinal wound. Patient presents on a Synergy air elite low airloss mattress on his backside. LLE in z-flex boot.     Old dressing removed to right groin. Wound irrigated with normal saline. Wound bed with 100% pink granulation tissue.  Periwound intact. No odor noted. Skin prep applied to periwound for protection. 1 piece of black sponge applied to wound bed. Clear drape placed as cover dressing. Negative pressure initiated at 100 mm/hg continuous pressure per MD order. Patient positioned to his left side with a pillow when assessment and treatment completed.

## 2017-07-03 NOTE — PROGRESS NOTE ADULT - SUBJECTIVE AND OBJECTIVE BOX
NEPHROLOGY INTERVAL HPI/OVERNIGHT EVENTS:  83 y/o wm with hx of CKD stage 3 ( scr in low 2's in past)  presents from [facility] with anemia with hgb of 7.7..      During course of hospitalization pt transfused and EGD was held due to his high risk.    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    PICC line placed for tx of groin revealing GNR with ENspp and corynebacteria  on Vanc and Zosyn with total of 6 weeks of abx planned.      Now with variable renal function in setting of higher vanc trough with Na values in 148.  Trial of IVF given and started this AM    7/1  doing well  more awake alert  Na better  pt has chronic hypernatremia    7/3  dc planning   diaphoretic today, lethargic  BGM approx 130, bp stable afebrile..  Pts heat was on, covered under blankets, got dilaudid 2 hrs ago          PAST MEDICAL & SURGICAL HISTORY:  - aniceto on cpap   - CKD stage 3 ( scr 2)  - DM  - GI bleed  - BPH s/p green light procedure  -gout  - HTN  - cad s/p pci  -COPD  - spincal stenosis  - HLD  - GERD  - PAD   - s/p rotator cuff tear      FAMILY HISTORY:  No pertinent family history in first degree relatives      MEDICATIONS  (STANDING):  aspirin  chewable 81 milliGRAM(s) Oral daily  heparin  Injectable 5000 Unit(s) SubCutaneous every 8 hours  allopurinol 100 milliGRAM(s) Oral daily  doxazosin 8 milliGRAM(s) Oral at bedtime  fenofibrate Tablet 145 milliGRAM(s) Oral daily  ferrous    sulfate 325 milliGRAM(s) Oral two times a day with meals  gabapentin 300 milliGRAM(s) Oral daily  hydrALAZINE 50 milliGRAM(s) Oral daily  HYDROmorphone   Tablet 2 milliGRAM(s) Oral every 6 hours  isosorbide   mononitrate ER Tablet (IMDUR) 120 milliGRAM(s) Oral daily  pramipexole 0.125 milliGRAM(s) Oral daily  simvastatin 10 milliGRAM(s) Oral at bedtime  piperacillin/tazobactam IVPB. 3.375 Gram(s) IV Intermittent every 8 hours  ammonium lactate 12% Lotion 1 Application(s) Topical daily  buDESOnide   0.5 milliGRAM(s) Respule 0.5 milliGRAM(s) Inhalation two times a day  metoprolol 25 milliGRAM(s) Oral two times a day  amLODIPine   Tablet 5 milliGRAM(s) Oral daily  pantoprazole    Tablet 40 milliGRAM(s) Oral before breakfast  furosemide   Injectable 40 milliGRAM(s) IV Push once    MEDICATIONS  (PRN):  HYDROmorphone  Injectable 1 milliGRAM(s) IV Push every 3 hours PRN Moderate Pain (4 - 6)  acetaminophen   Tablet. 500 milliGRAM(s) Oral every 6 hours PRN Mild Pain (1 - 3)  ALBUTerol    90 MICROgram(s) HFA Inhaler 2 Puff(s) Inhalation every 6 hours PRN Shortness of Breath  HYDROmorphone  Injectable 0.5 milliGRAM(s) IV Push every 6 hours PRN Severe Pain (7 - 10)        Allergies    No Known Allergies    Intolerances              Vital Signs Last 24 Hrs  T(C): 37 (03 Jul 2017 04:00), Max: 37.4 (02 Jul 2017 15:51)  T(F): 98.6 (03 Jul 2017 04:00), Max: 99.4 (02 Jul 2017 15:51)  HR: 67 (03 Jul 2017 12:15) (67 - 84)  BP: 156/43 (03 Jul 2017 12:00) (135/98 - 178/53)  BP(mean): 73 (03 Jul 2017 12:00) (62 - 107)  RR: 18 (03 Jul 2017 12:15) (15 - 22)  SpO2: 96% (03 Jul 2017 08:00) (96% - 96%)  I&O's Summary            PHYSICAL EXAM:  GEN: alert awake O X 3  HEENT: MMM  NECK supple no jvd  CV: RRR s1s2  LUNGS: b/l CTA  ABD: + soft,   EXT: left 1+ edema  right AKA, edema  less scrotal edema                                      9.0    5.8   )-----------( 226      ( 03 Jul 2017 07:14 )             28.2   07-03    147<H>  |  113<H>  |  41<H>  ----------------------------<  99  3.8   |  26  |  1.55<H>    Ca    8.2<L>      03 Jul 2017 07:14

## 2017-07-03 NOTE — PROGRESS NOTE ADULT - SUBJECTIVE AND OBJECTIVE BOX
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 Patient is laying comfortably in bed, NAD, and AAOx3. Patient denies left foot/ankle pain. Pt denies N/V/F/C/SOB/CP/headaches. Patients states his diarrhea is improving. Patient states he has been using the harness for his physical therapy.       MEDICATIONS  (STANDING):  aspirin  chewable 81 milliGRAM(s) Oral daily  heparin  Injectable 5000 Unit(s) SubCutaneous every 8 hours  allopurinol 100 milliGRAM(s) Oral daily  doxazosin 8 milliGRAM(s) Oral at bedtime  fenofibrate Tablet 145 milliGRAM(s) Oral daily  ferrous    sulfate 325 milliGRAM(s) Oral two times a day with meals  gabapentin 300 milliGRAM(s) Oral daily  hydrALAZINE 50 milliGRAM(s) Oral daily  HYDROmorphone   Tablet 2 milliGRAM(s) Oral every 6 hours  isosorbide   mononitrate ER Tablet (IMDUR) 120 milliGRAM(s) Oral daily  pramipexole 0.125 milliGRAM(s) Oral daily  simvastatin 10 milliGRAM(s) Oral at bedtime  piperacillin/tazobactam IVPB. 3.375 Gram(s) IV Intermittent every 8 hours  ammonium lactate 12% Lotion 1 Application(s) Topical daily  buDESOnide   0.5 milliGRAM(s) Respule 0.5 milliGRAM(s) Inhalation two times a day  metoprolol 25 milliGRAM(s) Oral two times a day  amLODIPine   Tablet 5 milliGRAM(s) Oral daily  pantoprazole    Tablet 40 milliGRAM(s) Oral before breakfast  furosemide    Tablet 40 milliGRAM(s) Oral daily    MEDICATIONS  (PRN):  HYDROmorphone  Injectable 1 milliGRAM(s) IV Push every 3 hours PRN Moderate Pain (4 - 6)  acetaminophen   Tablet. 500 milliGRAM(s) Oral every 6 hours PRN Mild Pain (1 - 3)  ALBUTerol    90 MICROgram(s) HFA Inhaler 2 Puff(s) Inhalation every 6 hours PRN Shortness of Breath  HYDROmorphone  Injectable 0.5 milliGRAM(s) IV Push every 6 hours PRN Severe Pain (7 - 10)    Allergies: NKFDA    Vital Signs Last 24 Hrs  T(C): 37 (03 Jul 2017 04:00), Max: 37.4 (02 Jul 2017 15:51)  T(F): 98.6 (03 Jul 2017 04:00), Max: 99.4 (02 Jul 2017 15:51)  HR: 75 (03 Jul 2017 06:00) (69 - 95)  BP: 178/53 (03 Jul 2017 06:00) (123/72 - 178/53)  BP(mean): 86 (03 Jul 2017 06:00) (62 - 90)  RR: 16 (03 Jul 2017 06:00) (15 - 23)  SpO2: --    PHYSICAL EXAM:  Constitutional: NAD, AAO x3  Extremities: Right LE AKA  Vascular: Left foot: DP and PT 1/4, CFT <4 sec x5, foot is warm to touch    Neuro: Protective sensation is decreased. Light touch test: intact  Derm: Preulcerative lesion noted on the postero left heel. No fluctuance, no open lesion, no active drainage, no acute clinical signs of infection, no mal-odor, no erythema, no edema. Toe nails 1-5 are well trimmed; yellow discoloration x5  MSK: No pain upon palpation of the left lower leg and foot. Compartment of the L leg and foot are non tender, and soft.       Lab:                                    9.0    5.8   )-----------( 226      ( 03 Jul 2017 07:14 )             28.2     03 Jul 2017 07:14    147    |  113    |  41     ----------------------------<  99     3.8     |  26     |  1.55     Ca    8.2        03 Jul 2017 07:14    C-diff: not detected ( 07/02/2017)     Culture - Other (06.17.17 @ 12:50)    Specimen Source: .Other right groin wound    Culture Results:   Moderate Mixed gram negative rods  Few Enterococcus species  Rare Corynebacterium species      Culture - Blood (06.17.17 @ 03:54)    Specimen Source: .Blood Blood    Culture Results:   No growth at 5 days.        RADIOLOGY:      EXAM:  US DPLX UPR EXT VEINS LTD LT                        PROCEDURE DATE:  07/02/2017      FINDINGS:    No previous examinations are available for review.    The LEFT upper extremity deep venous system demonstrated focal   nonocclusive thrombus in the medial cubital vein.  The remaining veins   were patent with intact Doppler flow.  The flow varied with respiration   and augmented with distal arm compression.  The arm veins were directly   compressible by the ultrasound transducer.          The veins evaluated included the internal jugular vein, subclavian vein,    axillary vein, the brachial vein, the basilic vein and the cephalic vein.    A catheter is seen with in the LEFT subclavian, axillary, brachial and   basilic veins.      IMPRESSION: Focal nonocclusive thrombus within the median cubital vein.

## 2017-07-03 NOTE — PROGRESS NOTE ADULT - PROBLEM SELECTOR PLAN 3
- U/S of L UE reveals thrombosis of superficial veins of left upper extremity  - Communicated to nurse by Dr. Beharry.  - Cold compresses of affected area. - U/S of L UE reveals thrombosis of superficial veins of left upper extremity  - Communicated to nurse by Dr. Beharry.  - Warm compresses of affected area.

## 2017-07-03 NOTE — PROGRESS NOTE ADULT - ASSESSMENT
83 y/o male with h/o CAD s/p stents (LAD, RCA bare metal around 9/16), PVD (RLE stent/ angioplasty) complicated with poorly healing right inguinal wound s/p prior surgical debridment (by Dr Siu 5/20 ) Brianna not on anticoagulation due to GI bleeding, Hypertension, COPD on home O2 2L, SHAYY on nocturnal BIPAP, Chronic diastolic CHF, Hyperlipidemia, PVD, Iron deficiency anemia, Chronic back pain, Gout, BPH, CKD III with baseline Cr 2, recent 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 was admitted on 6/16 for worsening anemia with Hb 6, worsening leg pain from ulcers and worsening renal function. He has gradual onset  progressive fatigue over the last couple of weeks. Patient is unable to ambulate because of his leg ulcers. In ER, he was started on vancomycin IV and zosyn.    1. Right inguinal open wound with probable infection with mixed GNR, corynebacterium spp and EN spp.  Right leg AKA  -inguinal wound culture shows mixed GNR, EN spp and corynebacterium spp  -on vancomycin 500 mg IV q12h and zosyn 3.375 gm IV q8h # 16  -tolerating abx well so far; no side effects noted  -vancomycin trough level is therapeutic; continue vancomycin dose to 400 mg IV q12h  -local wound care per surgery  -PICC line  -plan for 6 weeks of IV abx coverage  -monitor temps  -f/u CBC  -supportive care  2. Other issues: CAD s/p stents, PVD, Brianna not on anticoagulation due to GI bleeding, Hypertension, COPD, SHAYY on nocturnal BIPAP, Chronic diastolic CHF, Hyperlipidemia, PVD, Iron deficiency anemia, Chronic back pain, Gout, BPH, CKD III   -care per medicine

## 2017-07-03 NOTE — PROGRESS NOTE ADULT - ASSESSMENT
82 year old male patient s/p AKA RLE (6/27/17) is seen and evaluated for:      1. Pre-ulcerative lesion Left plantar heel; No clinical signs of infection noted  2. Other issues PVD; s/p  AKA      P:  Pt was seen and examined. Plan was discussed with attending Dr. Fermin.  Cavillon applied to preulcerative lesion on left heel and Z-flex boot reapplied.  Continue use of Z-flex boots while in bed  Recommend IV abx per ID, appreciated. PICC line noted to be inserted for long term IVabx for treatment of groin wound.  Care per Medicine and Vascular surgery appreciated.  Podiatry to follow while in house

## 2017-07-03 NOTE — PROGRESS NOTE ADULT - PROBLEM SELECTOR PLAN 8
- Encouraged TO increase free water intake  - Serial BMPs. - isosorbide mononitrate 120 mg Oral daily  - Continue Simvastatin  10mg  - Continue Aspirin 81mg daily  - Continue Fenofibrate 145mg.

## 2017-07-03 NOTE — PROGRESS NOTE ADULT - PROBLEM SELECTOR PLAN 7
- isosorbide mononitrate 120 mg Oral daily  - Continue Simvastatin  10mg  - Continue Aspirin 81mg daily  - Continue Fenofibrate 145mg. - Continue Amlodipine 5mg  - Continue Doxazosin 8mg  - Continue Metoprolol 25mg  - hydralazine 50 mg Oral daily.

## 2017-07-03 NOTE — PROGRESS NOTE ADULT - PROBLEM SELECTOR PLAN 1
- Current Hg= 9  - Hemodynamically stable  - Serial CBCs  - Transferred to room 2S332 - Current Hg= 8.9  - Hemodynamically stable  - Serial CBCs  - Transferred to room 2S332 - Current Hg= 8.9  - Hemodynamically stable  - Serial CBCs

## 2017-07-03 NOTE — PROGRESS NOTE ADULT - SUBJECTIVE AND OBJECTIVE BOX
CHIEF COMPLAINT: Anemia 2/2 GI Bleed  HPI: 82 year old male with history of CAD s/p stents (LAD, RCA bare metal around 9/16),PVD (RLE stent/ angioplasty and surgical debridment by Dr Siu 5/20 ) Brianna not on anticoagulation due to GI bleeding, Hypertension, COPD on home O2 2L, SHAYY on nocturnal BIPAP, ex-smoker (smoked 1ppd X 50 years, quit 22 years ago),  Chronic diastolic CHF, Hyperlipidemia, PVD, Iron deficiency anemia, Chronic back pain, Gout, BPH, CKD III with baseline Cr 2. Was ecently admitted to  on  4/17 with anemia of GI bleeding, RLE and RUE DVTs,( received pRBCs and IVC filter discharged to rehab with aspirin) was readmitted to   ER on 5/4/17 for worsening anemia with Hb 6, worsening leg pain from ulcers and worsening renal function Cr 3.99 now presents from [facility] with anemia (7.7 on labs today decreasing from 9.5 over past few weeks). Pt c/o gradual onset  progressive fatigue over the last couple of weeks. Patient is unable to ambulate because of his leg cellulitis and ulcers. He denies any shortness of breath, palpitations / chest pain / light headedness. According to the daughter the attendants at [facility] did notice dark stools for the last couple of days. Patient denies any abdominal pain or change in bowel or urinary habits.  In ED, + guaiac. (17 Jun 2017 00:37)    SUBJECTIVE: 7/3 Examined at bedside, pt reports feeling well. Reports decrease in scrotal swelling. Hemodynamically stable. Reports soreness in RUE 2/2 to superficial nonocclusive thrombus. Pt's sleep was compromised overnight 2/2 going to and from the bathroom due to his diarrhea. He was given his Dilaudid at 2pm and took a nap. Questionable lethargy upon waking up. Pt reports feeling well otherwise.    REVIEW OF SYSTEMS:  CONSTITUTIONAL: No weakness, fevers or chills  EYES/ENT: No visual changes;  No vertigo or throat pain   NECK: No pain or stiffness  RESPIRATORY: No cough, wheezing, hemoptysis; No shortness of breath  CARDIOVASCULAR: No chest pain or palpitations  GASTROINTESTINAL: No abdominal or epigastric pain. No nausea, vomiting, or hematemesis; No diarrhea or constipation. No melena or hematochezia.  GENITOURINARY: No dysuria, frequency or hematuria  NEUROLOGICAL: No numbness or weakness  SKIN: No itching, burning, rashes, or lesions   All other review of systems is negative unless indicated above    Vital Signs Last 24 Hrs  T(C): 37.1 (03 Jul 2017 17:26), Max: 37.3 (02 Jul 2017 23:53)  T(F): 98.8 (03 Jul 2017 17:26), Max: 99.2 (02 Jul 2017 23:53)  HR: 71 (03 Jul 2017 17:26) (67 - 84)  BP: 155/36 (03 Jul 2017 17:26) (135/98 - 178/53)  BP(mean): 67 (03 Jul 2017 14:00) (62 - 107)  RR: 18 (03 Jul 2017 14:00) (15 - 22)  SpO2: 96% (03 Jul 2017 17:26) (96% - 97%)    I&O's Summary    02 Jul 2017 07:01  -  03 Jul 2017 07:00  --------------------------------------------------------  IN: 940 mL / OUT: 2270 mL / NET: -1330 mL    03 Jul 2017 07:01  -  03 Jul 2017 17:44  --------------------------------------------------------  IN: 100 mL / OUT: 1200 mL / NET: -1100 mL      PHYSICAL EXAM:  Constitutional: NAD, awake and alert, well-developed  HEENT: PERR, EOMI, Normal Hearing, MMM  Neck: Soft and supple, No LAD, No JVD  Respiratory: Breath sounds are clear bilaterally, No wheezing, rales or rhonchi  Cardiovascular: S1 and S2, regular rate and rhythm, no Murmurs, gallops or rubs  Gastrointestinal: Bowel Sounds present, soft, nontender, nondistended, no guarding, no rebound  Extremities: No peripheral edema  Vascular: 2+ peripheral pulses  Neurological: A/O x 3, no focal deficits  Musculoskeletal: 5/5 strength b/l upper and lower extremities  Skin: No rashes      MEDICATIONS  (STANDING):  aspirin  chewable 81 milliGRAM(s) Oral daily  heparin  Injectable 5000 Unit(s) SubCutaneous every 8 hours  allopurinol 100 milliGRAM(s) Oral daily  doxazosin 8 milliGRAM(s) Oral at bedtime  fenofibrate Tablet 145 milliGRAM(s) Oral daily  ferrous    sulfate 325 milliGRAM(s) Oral two times a day with meals  gabapentin 300 milliGRAM(s) Oral daily  hydrALAZINE 50 milliGRAM(s) Oral daily  isosorbide   mononitrate ER Tablet (IMDUR) 120 milliGRAM(s) Oral daily  pramipexole 0.125 milliGRAM(s) Oral daily  simvastatin 10 milliGRAM(s) Oral at bedtime  piperacillin/tazobactam IVPB. 3.375 Gram(s) IV Intermittent every 8 hours  ammonium lactate 12% Lotion 1 Application(s) Topical daily  buDESOnide   0.5 milliGRAM(s) Respule 0.5 milliGRAM(s) Inhalation two times a day  metoprolol 25 milliGRAM(s) Oral two times a day  amLODIPine   Tablet 5 milliGRAM(s) Oral daily  pantoprazole    Tablet 40 milliGRAM(s) Oral before breakfast  vancomycin  IVPB 400 milliGRAM(s) IV Intermittent every 12 hours  furosemide    Tablet 40 milliGRAM(s) Oral two times a day    MEDICATIONS  (PRN):  acetaminophen   Tablet. 500 milliGRAM(s) Oral every 6 hours PRN Mild Pain (1 - 3)  ALBUTerol    90 MICROgram(s) HFA Inhaler 2 Puff(s) Inhalation every 6 hours PRN Shortness of Breath  HYDROmorphone   Tablet 1 milliGRAM(s) Oral three times a day PRN Moderate to Severe Pain (4 - 10)      LABS: All Labs Reviewed:                        9.0    5.8   )-----------( 226      ( 03 Jul 2017 07:14 )             28.2     07-03    147<H>  |  113<H>  |  41<H>  ----------------------------<  99  3.8   |  26  |  1.55<H>    Ca    8.2<L>      03 Jul 2017 07:14 HPI:  82 year old male with history of CAD s/p stents (LAD, RCA bare metal around 9/16),PVD (RLE stent/ angioplasty and surgical debridment by Dr Siu 5/20 ) A.Paul not on anticoagulation due to GI bleeding, Hypertension, COPD on home O2 2L, SAHYY on nocturnal BIPAP, ex-smoker (smoked 1ppd X 50 years, quit 22 years ago),  Chronic diastolic CHF, Hyperlipidemia, PVD, Iron deficiency anemia, Chronic back pain, Gout, BPH, CKD III with baseline Cr 2. Was ecently admitted to  on  4/17 with anemia of GI bleeding, RLE and RUE DVTs,( received pRBCs and IVC filter discharged to rehab with aspirin) was readmitted to   ER on 5/4/17 for worsening anemia with Hb 6, worsening leg pain from ulcers and worsening renal function Cr 3.99 now presents from [facility] with anemia (7.7 on labs today decreasing from 9.5 over past few weeks). Pt c/o gradual onset  progressive fatigue over the last couple of weeks. Patient is unable to ambulate because of his leg cellulitis and ulcers. He denies any shortness of breath, palpitations / chest pain / light headedness. According to the daughter the attendants at [facility] did notice dark stools for the last couple of days. Patient denies any abdominal pain or change in bowel or urinary habits.  In ED, + guaiac. (17 Jun 2017 00:37)    7/4: Pt examined at bedside. Reports tightness in his LUE muscle. Exasperated about ongoing diarrhea. Re-expressed preference for [facility]. Also pleased in the decrease in scrotal swelling. No additional concerns otherwise. Denies HA, Changes in vision, abdominal pain. Hemodynamically stable.       MEDICATIONS  (STANDING):  aspirin  chewable 81 milliGRAM(s) Oral daily  heparin  Injectable 5000 Unit(s) SubCutaneous every 8 hours  allopurinol 100 milliGRAM(s) Oral daily  doxazosin 8 milliGRAM(s) Oral at bedtime  fenofibrate Tablet 145 milliGRAM(s) Oral daily  ferrous    sulfate 325 milliGRAM(s) Oral two times a day with meals  gabapentin 300 milliGRAM(s) Oral daily  hydrALAZINE 50 milliGRAM(s) Oral daily  isosorbide   mononitrate ER Tablet (IMDUR) 120 milliGRAM(s) Oral daily  pramipexole 0.125 milliGRAM(s) Oral daily  simvastatin 10 milliGRAM(s) Oral at bedtime  piperacillin/tazobactam IVPB. 3.375 Gram(s) IV Intermittent every 8 hours  ammonium lactate 12% Lotion 1 Application(s) Topical daily  buDESOnide   0.5 milliGRAM(s) Respule 0.5 milliGRAM(s) Inhalation two times a day  metoprolol 25 milliGRAM(s) Oral two times a day  amLODIPine   Tablet 5 milliGRAM(s) Oral daily  pantoprazole    Tablet 40 milliGRAM(s) Oral before breakfast  vancomycin  IVPB 400 milliGRAM(s) IV Intermittent every 12 hours  furosemide    Tablet 40 milliGRAM(s) Oral two times a day    MEDICATIONS  (PRN):  acetaminophen   Tablet. 500 milliGRAM(s) Oral every 6 hours PRN Mild Pain (1 - 3)  ALBUTerol    90 MICROgram(s) HFA Inhaler 2 Puff(s) Inhalation every 6 hours PRN Shortness of Breath  HYDROmorphone   Tablet 1 milliGRAM(s) Oral three times a day PRN Moderate to Severe Pain (4 - 10)      ICU Vital Signs Last 24 Hrs  T(C): 36.8 (04 Jul 2017 17:16), Max: 37.2 (04 Jul 2017 04:49)  T(F): 98.2 (04 Jul 2017 17:16), Max: 98.9 (04 Jul 2017 04:49)  HR: 60 (04 Jul 2017 19:40) (58 - 73)  BP: 148/45 (04 Jul 2017 17:16) (146/52 - 148/45)  BP(mean): --  ABP: --  ABP(mean): --  RR: 17 (04 Jul 2017 17:16) (17 - 18)  SpO2: 100% (04 Jul 2017 17:16) (95% - 100%)    GEN: NAD, comfortable  CV:  S1S2, RRR, No murmur  RESP:  CTABL, good air movement, no rales, rhonchi, or wheezing  ABD: Soft, NT, ND, +BS, no guarding, no rigidity  EXT:  +2 radial and pedal pulse on LLE, no edema on right thigh either; AKA wound site clean, dry and intact                          8.9    5.7   )-----------( 243      ( 04 Jul 2017 05:34 )             27.6     04 Jul 2017 05:34    148    |  112    |  41     ----------------------------<  107    3.0     |  29     |  1.46     Ca    8.1        04 Jul 2017 05:34  Mg     1.7       04 Jul 2017 09:59          CAPILLARY BLOOD GLUCOSE HPI:  82 year old male with history of CAD s/p stents (LAD, RCA bare metal around 9/16),PVD (RLE stent/ angioplasty and surgical debridment by Dr Siu 5/20 ) A.Fib not on anticoagulation due to GI bleeding, Hypertension, COPD on home O2 2L, SHAYY on nocturnal BIPAP, ex-smoker (smoked 1ppd X 50 years, quit 22 years ago),  Chronic diastolic CHF, Hyperlipidemia, PVD, Iron deficiency anemia, Chronic back pain, Gout, BPH, CKD III with baseline Cr 2. Was recently admitted to  on  4/17 with anemia of GI bleeding, RLE and RUE DVTs,( received pRBCs and IVC filter discharged to rehab with aspirin) was readmitted to   ER on 5/4/17 for worsening anemia with Hb 6, worsening leg pain from ulcers and worsening renal function Cr 3.99 now presents from [facility] with anemia (7.7 on labs today decreasing from 9.5 over past few weeks). Pt c/o gradual onset  progressive fatigue over the last couple of weeks. Patient is unable to ambulate because of his leg cellulitis and ulcers. He denies any shortness of breath, palpitations / chest pain / light headedness. According to the daughter the attendants at [facility] did notice dark stools for the last couple of days. Patient denies any abdominal pain or change in bowel or urinary habits.  In ED, + guaiac. (17 Jun 2017 00:37)    7/5: Pt examined at bedside. Tightness and discomfort in his LUE muscle still persists. Diarrhea persists, had 6 bowel movements over ngiht. No additional concerns otherwise. Denies HA, Changes in vision, abdominal pain. Hemodynamically stable. Daughter present. Discussed possible etiologies of diarrhea. All questions answered satisfactorily    7/5 update: per social work, pt was not accepted at [facility], because he does not meet acute rehab criteria. He was accepted at [facility] but will not be placed on longterm unit, he will be placed on subacute unit.    MEDICATIONS  (STANDING):  aspirin  chewable 81 milliGRAM(s) Oral daily  heparin  Injectable 5000 Unit(s) SubCutaneous every 8 hours  allopurinol 100 milliGRAM(s) Oral daily  doxazosin 8 milliGRAM(s) Oral at bedtime  fenofibrate Tablet 145 milliGRAM(s) Oral daily  ferrous    sulfate 325 milliGRAM(s) Oral two times a day with meals  gabapentin 300 milliGRAM(s) Oral daily  hydrALAZINE 50 milliGRAM(s) Oral daily  isosorbide   mononitrate ER Tablet (IMDUR) 120 milliGRAM(s) Oral daily  pramipexole 0.125 milliGRAM(s) Oral daily  simvastatin 10 milliGRAM(s) Oral at bedtime  piperacillin/tazobactam IVPB. 3.375 Gram(s) IV Intermittent every 8 hours  ammonium lactate 12% Lotion 1 Application(s) Topical daily  buDESOnide   0.5 milliGRAM(s) Respule 0.5 milliGRAM(s) Inhalation two times a day  metoprolol 25 milliGRAM(s) Oral two times a day  amLODIPine   Tablet 5 milliGRAM(s) Oral daily  pantoprazole    Tablet 40 milliGRAM(s) Oral before breakfast  vancomycin  IVPB 400 milliGRAM(s) IV Intermittent every 12 hours  furosemide    Tablet 40 milliGRAM(s) Oral two times a day  lactobacillus acidophilus 1 Tablet(s) Oral daily    MEDICATIONS  (PRN):  acetaminophen   Tablet. 500 milliGRAM(s) Oral every 6 hours PRN Mild Pain (1 - 3)  ALBUTerol    90 MICROgram(s) HFA Inhaler 2 Puff(s) Inhalation every 6 hours PRN Shortness of Breath  HYDROmorphone   Tablet 1 milliGRAM(s) Oral three times a day PRN Moderate to Severe Pain (4 - 10)    ICU Vital Signs Last 24 Hrs  T(C): 37 (05 Jul 2017 10:29), Max: 37.3 (05 Jul 2017 05:45)  T(F): 98.6 (05 Jul 2017 10:29), Max: 99.2 (05 Jul 2017 05:45)  HR: 68 (05 Jul 2017 10:29) (58 - 69)  BP: 155/48 (05 Jul 2017 10:29) (148/45 - 185/48)  BP(mean): --  ABP: --  ABP(mean): --  RR: 18 (05 Jul 2017 10:29) (17 - 18)  SpO2: 100% (05 Jul 2017 10:29) (99% - 100%)    Physical Exam  GEN: NAD, comfortable  CV:  S1S2, RRR, No murmur  RESP:  CTABL, good air movement, no rales, rhonchi, or wheezing  ABD: Soft, NT, ND, +BS, no guarding, no rigidity  EXT:  +2 radial and pedal pulse on LLE, no edema on right thigh either; AKA wound site clean, dry and intact                          8.9    6.1   )-----------( 224      ( 05 Jul 2017 05:05 )             27.6     05 Jul 2017 05:05    145    |  110    |  38     ----------------------------<  103    3.6     |  30     |  1.43     Ca    8.2        05 Jul 2017 05:05  Mg     1.7       04 Jul 2017 09:59                              8.9    6.1   )-----------( 224      ( 05 Jul 2017 05:05 )             27.6     05 Jul 2017 05:05    145    |  110    |  38     ----------------------------<  103    3.6     |  30     |  1.43     Ca    8.2        05 Jul 2017 05:05  Mg     1.7       04 Jul 2017 09:59

## 2017-07-03 NOTE — PROGRESS NOTE ADULT - SUBJECTIVE AND OBJECTIVE BOX
had multiple loose bowel movements yesterday; no abdominal pain, feels well otherwise    MEDICATIONS  (STANDING):  aspirin  chewable 81 milliGRAM(s) Oral daily  heparin  Injectable 5000 Unit(s) SubCutaneous every 8 hours  allopurinol 100 milliGRAM(s) Oral daily  doxazosin 8 milliGRAM(s) Oral at bedtime  fenofibrate Tablet 145 milliGRAM(s) Oral daily  ferrous    sulfate 325 milliGRAM(s) Oral two times a day with meals  gabapentin 300 milliGRAM(s) Oral daily  hydrALAZINE 50 milliGRAM(s) Oral daily  HYDROmorphone   Tablet 2 milliGRAM(s) Oral every 6 hours  isosorbide   mononitrate ER Tablet (IMDUR) 120 milliGRAM(s) Oral daily  pramipexole 0.125 milliGRAM(s) Oral daily  simvastatin 10 milliGRAM(s) Oral at bedtime  piperacillin/tazobactam IVPB. 3.375 Gram(s) IV Intermittent every 8 hours  ammonium lactate 12% Lotion 1 Application(s) Topical daily  buDESOnide   0.5 milliGRAM(s) Respule 0.5 milliGRAM(s) Inhalation two times a day  metoprolol 25 milliGRAM(s) Oral two times a day  amLODIPine   Tablet 5 milliGRAM(s) Oral daily  pantoprazole    Tablet 40 milliGRAM(s) Oral before breakfast  furosemide    Tablet 40 milliGRAM(s) Oral daily    MEDICATIONS  (PRN):  HYDROmorphone  Injectable 1 milliGRAM(s) IV Push every 3 hours PRN Moderate Pain (4 - 6)  acetaminophen   Tablet. 500 milliGRAM(s) Oral every 6 hours PRN Mild Pain (1 - 3)  ALBUTerol    90 MICROgram(s) HFA Inhaler 2 Puff(s) Inhalation every 6 hours PRN Shortness of Breath  HYDROmorphone  Injectable 0.5 milliGRAM(s) IV Push every 6 hours PRN Severe Pain (7 - 10)      Allergies    No Known Allergies    Intolerances        Flatus: [ ] YES [ ] NO             Bowel Movement: [ ] YES [ ] NO  Pain (0-10):            Pain Control Adequate: [ ] YES [ ] NO  Nausea: [ ] YES [ ] NO            Vomiting: [ ] YES [ ] NO  Diarrhea: [ ] YES [ ] NO         Constipation: [ ] YES [ ] NO     Chest Pain: [ ] YES [ ] NO    SOB:  [ ] YES [ ] NO    Vital Signs Last 24 Hrs  T(C): 37 (03 Jul 2017 04:00), Max: 37.4 (02 Jul 2017 15:51)  T(F): 98.6 (03 Jul 2017 04:00), Max: 99.4 (02 Jul 2017 15:51)  HR: 75 (03 Jul 2017 06:00) (69 - 102)  BP: 178/53 (03 Jul 2017 06:00) (123/72 - 178/53)  BP(mean): 86 (03 Jul 2017 06:00) (62 - 90)  RR: 16 (03 Jul 2017 06:00) (15 - 23)  SpO2: --    I&O's Summary    02 Jul 2017 07:01  -  03 Jul 2017 07:00  --------------------------------------------------------  IN: 940 mL / OUT: 2270 mL / NET: -1330 mL        Physical Exam:  General: NAD, resting comfortably  Pulmonary: normal resp effort, CTA-B  Cardiovascular: NSR  Abdominal: soft, NT/ND  Extremities: WWP, normal strength  Neuro: A/O x 3, CNs II-XII grossly intact, normal motor/sensation, no focal deficits  Pulses:   Right:                                                                          Left:  FEM [ ]2+ [ ]1+ [ ]doppler                                             FEM [ ]2+ [ ]1+ [ ]doppler    POP [ ]2+ [ ]1+ [ ]doppler                                             POP [ ]2+ [ ]1+ [ ]doppler    DP [ ]2+ [ ]1+ [ ]doppler                                                DP [ ]2+ [ ]1+ [ ]doppler  PT[ ]2+ [ ]1+ [ ]doppler                                                  PT [ ]2+ [ ]1+ [ ]doppler    LABS:                        9.0    5.8   )-----------( 226      ( 03 Jul 2017 07:14 )             28.2     07-02    146<H>  |  112<H>  |  37<H>  ----------------------------<  104<H>  3.3<L>   |  26  |  1.45<H>    Ca    7.9<L>      02 Jul 2017 08:59            CAPILLARY BLOOD GLUCOSE          RADIOLOGY & ADDITIONAL TESTS:

## 2017-07-03 NOTE — PROGRESS NOTE ADULT - PROBLEM SELECTOR PLAN 5
- Scrotal ultrasound benign  - Improved scrotal swelling  - Given 1x Lasix 40mg. - K at 3.0 today  - K repleted  - Mag at 1.7  - Mag repleted  - Na @ 148:  Encouraged to increase free water intake  - Serial BMPs. - K at 3.6 today  - Na @ 148:  Encouraged to increase free water intake.   - Serial BMPs.  - Appreciate Nephro consult, Dr. Zamora: - pt at baseline Na level. d/c IVF; start lasix 40 qd in a.m., strict i/o

## 2017-07-03 NOTE — PROGRESS NOTE ADULT - PROBLEM SELECTOR PLAN 6
- Continue Amlodipine 5mg  - Continue Doxazosin 8mg  - Continue Metoprolol 25mg  - hydralazine 50 mg Oral daily. - Scrotal ultrasound benign  - Improved scrotal swelling almost back to baseline  - Given 1x Lasix 40mg. - Scrotal ultrasound benign  - Resolved- scrotum size is back to baseline

## 2017-07-03 NOTE — PROGRESS NOTE ADULT - ASSESSMENT
NEPHROLOGY INTERVAL HPI/OVERNIGHT EVENTS:  81 y/o wm with hx of CKD stage 3 ( scr in low 2's in past)  presents from [facility] with anemia with hgb of 7.7..      During course of hospitalization pt transfused and EGD was held due to his high risk.    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    PICC line placed for tx of groin revealing GNR with ENspp and corynebacteria  on Vanc and Zosyn with total of 6 weeks of abx planned.      Now with variable renal function in setting of higher vanc trough with Na values in 148.  Trial of IVF given and started this AM    7/1  doing well  more awake alert  Na better  pt has chronic hypernatremia    7/2  Hypokalemia noted along w edema of the stump, scrotum  Pt is uncomfortable  Got one dose of lasix earlier, will repeat dose in 6 hours  Kcl 40 meq po x 1    7/3   as above d/w Dr Hernandez re pts diaphoresis, will monitor, dc Dilaudid  labs stable  edema improved w lasix will cont 40 mg po bid NEPHROLOGY INTERVAL HPI/OVERNIGHT EVENTS:  83 y/o wm with hx of CKD stage 3 ( scr in low 2's in past)  presents from [facility] with anemia with hgb of 7.7..      During course of hospitalization pt transfused and EGD was held due to his high risk.    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    PICC line placed for tx of groin revealing GNR with ENspp and corynebacteria  on Vanc and Zosyn with total of 6 weeks of abx planned.      Now with variable renal function in setting of higher vanc trough with Na values in 148.  Trial of IVF given and started this AM    7/1  doing well  more awake alert  Na better  pt has chronic hypernatremia    7/2  Hypokalemia noted along w edema of the stump, scrotum  Pt is uncomfortable  Got one dose of lasix earlier, will repeat dose in 6 hours  Kcl 40 meq po x 1    7/3   as above d/w Dr Hernandez re pts diaphoresis, will monitor, dc Dilaudid 2 mg, and iv dilaudid..  start dilaudid 1 mg po tid prn moderate to severe pain..  labs stable  edema improved w lasix will cont 40 mg po bid

## 2017-07-03 NOTE — PROGRESS NOTE ADULT - PROBLEM SELECTOR PLAN 4
- Currently on vancomycin and have been experiencing diarrhea since Friday 6/30  - C Diff negative  - No fever. - Currently on vancomycin and have been experiencing diarrhea since Friday 6/30  - C Diff negative  - No fever  - One dose of loperamide 2mg - Currently on vancomycin and have been experiencing diarrhea since Friday 6/30  - C Diff negative  - No fever; no other signs of infection, could possibly be 2/2 lactose intolerance or other gut microbiota disturbance  - 2nd dose of loperamide 2mg  - start lactobacillus acidophilus

## 2017-07-03 NOTE — PROGRESS NOTE ADULT - SUBJECTIVE AND OBJECTIVE BOX
Pt has been seen and examined with FP resident, resident supervised agree with a/p       Patient is a 82y old  Male who presents with a chief complaint of Sent from [facility] rehab because of low hemoglobin 7.7 (17 Jun 2017 00:37)        HPI:  82 year old male with history of CAD s/p stents (LAD, RCA bare metal around 9/16),PVD (RLE stent/ angioplasty and surgical debridment by Dr Siu 5/20 ) A.Fib not on anticoagulation due to GI bleeding, Hypertension, COPD on home O2 2L, SHAYY on nocturnal BIPAP, ex-smoker (smoked 1ppd X 50 years, quit 22 years ago),  Chronic diastolic CHF, Hyperlipidemia, PVD, Iron deficiency anemia, Chronic back pain, Gout, BPH, CKD III with baseline Cr 2. Was ecently admitted to  on  4/17 with anemia of GI bleeding, RLE and RUE DVTs,( received pRBCs and IVC filter discharged to rehab with aspirin) was readmitted to   ER on 5/4/17 for worsening anemia with Hb 6, worsening leg pain from ulcers and worsening renal function Cr 3.99 now presents from [facility] with anemia (7.7 on labs today decreasing from 9.5 over past few weeks). Pt c/o gradual onset  progressive fatigue over the last couple of weeks. Patient is unable to ambulate because of his leg cellulitis and ulcers. He denies any shortness of breath, palpitations / chest pain / light headedness. According to the daughter the attendants at [facility] did notice dark stools for the last couple of days. Patient denies any abdominal pain or change in bowel or urinary habits.  In ED, + guaiac. (17 Jun 2017 00:37)        PHYSICAL EXAM:  Vital Signs Last 24 Hrs  T(C): 37 (03 Jul 2017 04:00), Max: 37.4 (02 Jul 2017 15:51)  T(F): 98.6 (03 Jul 2017 04:00), Max: 99.4 (02 Jul 2017 15:51)  HR: 80 (03 Jul 2017 09:00) (69 - 93)  BP: 178/53 (03 Jul 2017 06:00) (137/34 - 178/53)  BP(mean): 86 (03 Jul 2017 06:00) (62 - 86)  RR: 15 (03 Jul 2017 08:00) (15 - 23)  SpO2: 96% (03 Jul 2017 08:00) (96% - 96%)  SpO2: --  general- comfortable   -rs-aeeb,cta  -cvs-s1s2 normal   -p/a-soft,bs+   -extremity- right leg s/p AKA, groin wound present  -cns- non focal   -groin- scrotal swelling noted- no tenderness noted       A/P    #Anemia-Anemia due to chronic disease or gi bleed     #Acute anemia on anemia due to chronic disease   -s/p prbc infusion      #fever- possibly from wound infection -groin wound currently active   -ct abx, need longer iv abx     #DVT pr-heparin    #hypernatremia- monitor it     #ARF-hold off on iv fluids, monitor it     #Scrotal swelling- scrotal wall edema- monitor it, s/p lasix, hold off on further lasix, scrotal elevation     #above discussed with pt and all questions have been answered

## 2017-07-03 NOTE — PROGRESS NOTE ADULT - ASSESSMENT
82 year old male with history of CAD s/p stents (LAD, RCA bare metal around 9/16),PVD (RLE stent/ angioplasty and surgical debridement by Dr Siu 5/20 ) A.Fib not on anticoagulation due to GI bleeding, Hypertension, COPD on home O2 2L, SHAYY on nocturnal BIPAP, Chronic diastolic CHF, Hyperlipidemia, PVD, Iron deficiency anemia, Chronic back pain, Gout, BPH, CKD III with baseline Cr 2.   Pt was admitted on 6/17 due to anemia 2/2 GI bleed for pRBC transfusion. Pt underwent R AKA for progressive nonhealing RLE ulcers; POD #5. Had a pRBC transfusion 7/1 2/2 to Hgb of 7.7. 7/2, has enlarged scrotum to the size of grape fruit and swelling in LUE positive for superficial nonocclusive thrombus. Has diarrhea that started on 6/30 but was attributed to senna intake; but diarrhea still persists. 7/3: scrotal swelling significantly resolved; slightly swollen. 82 year old male with history of CAD s/p stents (LAD, RCA bare metal around 9/16),PVD (RLE stent/ angioplasty and surgical debridement by Dr Siu 5/20 ) A.Fib not on anticoagulation due to GI bleeding, Hypertension, COPD on home O2 2L, SHAYY on nocturnal BIPAP, Chronic diastolic CHF, Hyperlipidemia, PVD, Iron deficiency anemia, Chronic back pain, Gout, BPH, CKD III with baseline Cr 2.   Pt was admitted on 6/17 due to anemia 2/2 GI bleed for pRBC transfusion. Pt underwent R AKA for progressive nonhealing RLE ulcers; POD #5. Had a pRBC transfusion 7/1 2/2 to Hgb of 7.7. 7/2, has enlarged scrotum to the size of grape fruit and swelling in LUE positive for superficial nonocclusive thrombus. Has diarrhea that started on 6/30 but was attributed to senna intake; but diarrhea still persists. 7/3: scrotal swelling significantly resolved; slightly swollen. 7/5: Diarrhea persists. Given 2nd dose of immodium and probiotics

## 2017-07-04 DIAGNOSIS — E87.8 OTHER DISORDERS OF ELECTROLYTE AND FLUID BALANCE, NOT ELSEWHERE CLASSIFIED: ICD-10-CM

## 2017-07-04 LAB
ANION GAP SERPL CALC-SCNC: 7 MMOL/L — SIGNIFICANT CHANGE UP (ref 5–17)
BUN SERPL-MCNC: 41 MG/DL — HIGH (ref 7–23)
CALCIUM SERPL-MCNC: 8.1 MG/DL — LOW (ref 8.5–10.1)
CHLORIDE SERPL-SCNC: 112 MMOL/L — HIGH (ref 96–108)
CO2 SERPL-SCNC: 29 MMOL/L — SIGNIFICANT CHANGE UP (ref 22–31)
CREAT SERPL-MCNC: 1.46 MG/DL — HIGH (ref 0.5–1.3)
GLUCOSE SERPL-MCNC: 107 MG/DL — HIGH (ref 70–99)
HCT VFR BLD CALC: 27.6 % — LOW (ref 39–50)
HGB BLD-MCNC: 8.9 G/DL — LOW (ref 13–17)
MAGNESIUM SERPL-MCNC: 1.7 MG/DL — SIGNIFICANT CHANGE UP (ref 1.6–2.6)
MCHC RBC-ENTMCNC: 28.9 PG — SIGNIFICANT CHANGE UP (ref 27–34)
MCHC RBC-ENTMCNC: 32.4 GM/DL — SIGNIFICANT CHANGE UP (ref 32–36)
MCV RBC AUTO: 89.2 FL — SIGNIFICANT CHANGE UP (ref 80–100)
PLATELET # BLD AUTO: 243 K/UL — SIGNIFICANT CHANGE UP (ref 150–400)
POTASSIUM SERPL-MCNC: 3 MMOL/L — LOW (ref 3.5–5.3)
POTASSIUM SERPL-SCNC: 3 MMOL/L — LOW (ref 3.5–5.3)
RBC # BLD: 3.09 M/UL — LOW (ref 4.2–5.8)
RBC # FLD: 17 % — HIGH (ref 10.3–14.5)
SODIUM SERPL-SCNC: 148 MMOL/L — HIGH (ref 135–145)
WBC # BLD: 5.7 K/UL — SIGNIFICANT CHANGE UP (ref 3.8–10.5)
WBC # FLD AUTO: 5.7 K/UL — SIGNIFICANT CHANGE UP (ref 3.8–10.5)

## 2017-07-04 RX ORDER — LOPERAMIDE HCL 2 MG
2 TABLET ORAL ONCE
Qty: 0 | Refills: 0 | Status: COMPLETED | OUTPATIENT
Start: 2017-07-04 | End: 2017-07-05

## 2017-07-04 RX ORDER — POTASSIUM CHLORIDE 20 MEQ
10 PACKET (EA) ORAL
Qty: 0 | Refills: 0 | Status: COMPLETED | OUTPATIENT
Start: 2017-07-04 | End: 2017-07-04

## 2017-07-04 RX ORDER — MAGNESIUM SULFATE 500 MG/ML
1 VIAL (ML) INJECTION ONCE
Qty: 0 | Refills: 0 | Status: COMPLETED | OUTPATIENT
Start: 2017-07-04 | End: 2017-07-04

## 2017-07-04 RX ORDER — POTASSIUM CHLORIDE 20 MEQ
40 PACKET (EA) ORAL EVERY 4 HOURS
Qty: 0 | Refills: 0 | Status: COMPLETED | OUTPATIENT
Start: 2017-07-04 | End: 2017-07-04

## 2017-07-04 RX ADMIN — GABAPENTIN 300 MILLIGRAM(S): 400 CAPSULE ORAL at 11:31

## 2017-07-04 RX ADMIN — ISOSORBIDE MONONITRATE 120 MILLIGRAM(S): 60 TABLET, EXTENDED RELEASE ORAL at 11:32

## 2017-07-04 RX ADMIN — Medication 325 MILLIGRAM(S): at 08:12

## 2017-07-04 RX ADMIN — HEPARIN SODIUM 5000 UNIT(S): 5000 INJECTION INTRAVENOUS; SUBCUTANEOUS at 13:59

## 2017-07-04 RX ADMIN — Medication 40 MILLIEQUIVALENT(S): at 17:51

## 2017-07-04 RX ADMIN — PRAMIPEXOLE DIHYDROCHLORIDE 0.12 MILLIGRAM(S): 0.12 TABLET ORAL at 11:31

## 2017-07-04 RX ADMIN — Medication 25 MILLIGRAM(S): at 05:06

## 2017-07-04 RX ADMIN — Medication 25 MILLIGRAM(S): at 17:51

## 2017-07-04 RX ADMIN — HYDROMORPHONE HYDROCHLORIDE 1 MILLIGRAM(S): 2 INJECTION INTRAMUSCULAR; INTRAVENOUS; SUBCUTANEOUS at 22:50

## 2017-07-04 RX ADMIN — Medication 100 MILLIGRAM(S): at 05:06

## 2017-07-04 RX ADMIN — Medication 100 MILLIEQUIVALENT(S): at 22:32

## 2017-07-04 RX ADMIN — SIMVASTATIN 10 MILLIGRAM(S): 20 TABLET, FILM COATED ORAL at 22:33

## 2017-07-04 RX ADMIN — Medication 40 MILLIGRAM(S): at 05:06

## 2017-07-04 RX ADMIN — PIPERACILLIN AND TAZOBACTAM 25 GRAM(S): 4; .5 INJECTION, POWDER, LYOPHILIZED, FOR SOLUTION INTRAVENOUS at 14:01

## 2017-07-04 RX ADMIN — PIPERACILLIN AND TAZOBACTAM 25 GRAM(S): 4; .5 INJECTION, POWDER, LYOPHILIZED, FOR SOLUTION INTRAVENOUS at 05:06

## 2017-07-04 RX ADMIN — Medication 0.5 MILLIGRAM(S): at 08:02

## 2017-07-04 RX ADMIN — Medication 40 MILLIEQUIVALENT(S): at 14:03

## 2017-07-04 RX ADMIN — Medication 100 MILLIGRAM(S): at 11:32

## 2017-07-04 RX ADMIN — PANTOPRAZOLE SODIUM 40 MILLIGRAM(S): 20 TABLET, DELAYED RELEASE ORAL at 09:02

## 2017-07-04 RX ADMIN — Medication 100 GRAM(S): at 22:32

## 2017-07-04 RX ADMIN — HEPARIN SODIUM 5000 UNIT(S): 5000 INJECTION INTRAVENOUS; SUBCUTANEOUS at 05:06

## 2017-07-04 RX ADMIN — Medication 50 MILLIGRAM(S): at 05:06

## 2017-07-04 RX ADMIN — Medication 100 MILLIGRAM(S): at 17:51

## 2017-07-04 RX ADMIN — Medication 40 MILLIGRAM(S): at 17:49

## 2017-07-04 RX ADMIN — Medication 1 APPLICATION(S): at 13:58

## 2017-07-04 RX ADMIN — Medication 81 MILLIGRAM(S): at 11:32

## 2017-07-04 RX ADMIN — HYDROMORPHONE HYDROCHLORIDE 1 MILLIGRAM(S): 2 INJECTION INTRAMUSCULAR; INTRAVENOUS; SUBCUTANEOUS at 06:15

## 2017-07-04 RX ADMIN — Medication 145 MILLIGRAM(S): at 11:32

## 2017-07-04 RX ADMIN — HEPARIN SODIUM 5000 UNIT(S): 5000 INJECTION INTRAVENOUS; SUBCUTANEOUS at 22:33

## 2017-07-04 RX ADMIN — HYDROMORPHONE HYDROCHLORIDE 1 MILLIGRAM(S): 2 INJECTION INTRAMUSCULAR; INTRAVENOUS; SUBCUTANEOUS at 10:18

## 2017-07-04 RX ADMIN — Medication 8 MILLIGRAM(S): at 22:32

## 2017-07-04 RX ADMIN — Medication 325 MILLIGRAM(S): at 17:49

## 2017-07-04 RX ADMIN — AMLODIPINE BESYLATE 5 MILLIGRAM(S): 2.5 TABLET ORAL at 05:06

## 2017-07-04 RX ADMIN — Medication 0.5 MILLIGRAM(S): at 19:40

## 2017-07-04 RX ADMIN — Medication 100 MILLIEQUIVALENT(S): at 23:45

## 2017-07-04 NOTE — PROGRESS NOTE ADULT - SUBJECTIVE AND OBJECTIVE BOX
complains of stump pain    MEDICATIONS  (STANDING):  aspirin  chewable 81 milliGRAM(s) Oral daily  heparin  Injectable 5000 Unit(s) SubCutaneous every 8 hours  allopurinol 100 milliGRAM(s) Oral daily  doxazosin 8 milliGRAM(s) Oral at bedtime  fenofibrate Tablet 145 milliGRAM(s) Oral daily  ferrous    sulfate 325 milliGRAM(s) Oral two times a day with meals  gabapentin 300 milliGRAM(s) Oral daily  hydrALAZINE 50 milliGRAM(s) Oral daily  isosorbide   mononitrate ER Tablet (IMDUR) 120 milliGRAM(s) Oral daily  pramipexole 0.125 milliGRAM(s) Oral daily  simvastatin 10 milliGRAM(s) Oral at bedtime  piperacillin/tazobactam IVPB. 3.375 Gram(s) IV Intermittent every 8 hours  ammonium lactate 12% Lotion 1 Application(s) Topical daily  buDESOnide   0.5 milliGRAM(s) Respule 0.5 milliGRAM(s) Inhalation two times a day  metoprolol 25 milliGRAM(s) Oral two times a day  amLODIPine   Tablet 5 milliGRAM(s) Oral daily  pantoprazole    Tablet 40 milliGRAM(s) Oral before breakfast  vancomycin  IVPB 400 milliGRAM(s) IV Intermittent every 12 hours  furosemide    Tablet 40 milliGRAM(s) Oral two times a day    MEDICATIONS  (PRN):  acetaminophen   Tablet. 500 milliGRAM(s) Oral every 6 hours PRN Mild Pain (1 - 3)  ALBUTerol    90 MICROgram(s) HFA Inhaler 2 Puff(s) Inhalation every 6 hours PRN Shortness of Breath  HYDROmorphone   Tablet 1 milliGRAM(s) Oral three times a day PRN Moderate to Severe Pain (4 - 10)      Allergies    No Known Allergies    Intolerances        Flatus: [ ] YES [ ] NO             Bowel Movement: [ ] YES [ ] NO  Pain (0-10):            Pain Control Adequate: [ ] YES [ ] NO  Nausea: [ ] YES [ ] NO            Vomiting: [ ] YES [ ] NO  Diarrhea: [ ] YES [ ] NO         Constipation: [ ] YES [ ] NO     Chest Pain: [ ] YES [ ] NO    SOB:  [ ] YES [ ] NO    Vital Signs Last 24 Hrs  T(C): 37.2 (04 Jul 2017 04:49), Max: 37.2 (04 Jul 2017 04:49)  T(F): 98.9 (04 Jul 2017 04:49), Max: 98.9 (04 Jul 2017 04:49)  HR: 66 (04 Jul 2017 08:10) (66 - 84)  BP: 155/36 (03 Jul 2017 17:26) (140/102 - 156/43)  BP(mean): 67 (03 Jul 2017 14:00) (67 - 105)  RR: 18 (03 Jul 2017 14:00) (17 - 22)  SpO2: 98% (04 Jul 2017 04:49) (96% - 98%)    I&O's Summary    03 Jul 2017 07:01  -  04 Jul 2017 07:00  --------------------------------------------------------  IN: 220 mL / OUT: 1200 mL / NET: -980 mL    04 Jul 2017 07:01  -  04 Jul 2017 08:41  --------------------------------------------------------  IN: 0 mL / OUT: 250 mL / NET: -250 mL        Physical Exam:  General: NAD, resting comfortably  Pulmonary: normal resp effort, CTA-B  Cardiovascular: NSR  Abdominal: soft, NT/ND  Extremities: WWP, normal strength  Neuro: A/O x 3, CNs II-XII grossly intact, normal motor/sensation, no focal deficits  Pulses:   R AKA dressing intact and without evidence of drainage; L leg stasis changes mild  LABS:                        8.9    5.7   )-----------( 243      ( 04 Jul 2017 05:34 )             27.6     07-04    148<H>  |  112<H>  |  41<H>  ----------------------------<  107<H>  3.0<L>   |  29  |  1.46<H>    Ca    8.1<L>      04 Jul 2017 05:34            CAPILLARY BLOOD GLUCOSE          RADIOLOGY & ADDITIONAL TESTS:

## 2017-07-04 NOTE — PROGRESS NOTE ADULT - PROBLEM SELECTOR PLAN 7
- Continue Amlodipine 5mg  - Continue Doxazosin 8mg  - Continue Metoprolol 25mg  - hydralazine 50 mg Oral daily.

## 2017-07-04 NOTE — PROGRESS NOTE ADULT - SUBJECTIVE AND OBJECTIVE BOX
CHIEF COMPLAINT: Sent from [facility] rehab because of low hemoglobin 7.7    SUBJECTIVE: Patient seen and examined with DrsDesmond Shearer, Dragan Fisher, Maik Le, Mariel Bell on the Family Medicine Teaching Service.  Agree with history, physical, labs and plan which were reviewed in detail.      82 year old male with history of CAD s/p stents (LAD, RCA bare metal around 9/16),PVD (RLE stent/ angioplasty and surgical debridment by Dr Siu 5/20 ) A.Fib not on anticoagulation due to GI bleeding, Hypertension, COPD on home O2 2L, SHAYY on nocturnal BIPAP, ex-smoker (smoked 1ppd X 50 years, quit 22 years ago),  Chronic diastolic CHF, Hyperlipidemia, PVD, Iron deficiency anemia, Chronic back pain, Gout, BPH, CKD III with baseline Cr 2. Was ecently admitted to  on  4/17 with anemia of GI bleeding, RLE and RUE DVTs,( received pRBCs and IVC filter discharged to rehab with aspirin) was readmitted to   ER on 5/4/17 for worsening anemia with Hb 6, worsening leg pain from ulcers and worsening renal function Cr 3.99 now presents from [facility] with anemia (7.7 on labs today decreasing from 9.5 over past few weeks). Pt c/o gradual onset  progressive fatigue over the last couple of weeks. Patient is unable to ambulate because of his leg cellulitis and ulcers. He denies any shortness of breath, palpitations / chest pain / light headedness. According to the daughter the attendants at [facility] did notice dark stools for the last couple of days. Patient denies any abdominal pain or change in bowel or urinary habits.  In ED, + guaiac.     7/4/17  Patient is s/p Right AKA POD # 7  for f/u pre-ulcerative lesion of the left heel. Patient seen with daughter and family at bedside.  Patient complains of pain in left arm which is tender to the touch.  States he is having soft bowel movements. Denies any abdominal pain. Denies any constipation or nausea or vomiting. no sweats, no chills, no fever.     REVIEW OF SYSTEMS:    CONSTITUTIONAL: No weakness, fevers or chills  EYES/ENT: No visual changes;  No vertigo or throat pain   NECK: No pain or stiffness  RESPIRATORY: No cough, wheezing, hemoptysis; No shortness of breath  CARDIOVASCULAR: No chest pain or palpitations  GASTROINTESTINAL: No abdominal or epigastric pain. No nausea, vomiting, or hematemesis; No constipation. No melena or hematochezia.  GENITOURINARY: No dysuria, frequency or hematuria  NEUROLOGICAL: No numbness or weakness  SKIN: No itching, burning, rashes, or lesions   All other review of systems is negative unless indicated above    Vital Signs Last 24 Hrs  T(C): 36.8 (04 Jul 2017 17:16), Max: 37.2 (04 Jul 2017 04:49)  T(F): 98.2 (04 Jul 2017 17:16), Max: 98.9 (04 Jul 2017 04:49)  HR: 60 (04 Jul 2017 19:40) (58 - 73)  BP: 148/45 (04 Jul 2017 17:16) (146/52 - 148/45)  BP(mean): --  RR: 17 (04 Jul 2017 17:16) (17 - 18)  SpO2: 100% (04 Jul 2017 17:16) (95% - 100%)    I&O's Summary    03 Jul 2017 07:01  -  04 Jul 2017 07:00  --------------------------------------------------------  IN: 220 mL / OUT: 1200 mL / NET: -980 mL    04 Jul 2017 07:01  -  04 Jul 2017 21:42  --------------------------------------------------------  IN: 0 mL / OUT: 250 mL / NET: -250 mL        CAPILLARY BLOOD GLUCOSE          PHYSICAL EXAM:    Constitutional: NAD, awake and alert, well-developed  HEENT: EOMI, Normal Hearing, MMM  Neck: Soft and supple, No LAD, No JVD  Respiratory: Breath sounds are clear bilaterally, No wheezing, rales or rhonchi  Cardiovascular: S1 and S2, regular rate and rhythm, no Murmurs, gallops or rubs  Gastrointestinal: Bowel Sounds present, soft, nontender, nondistended, no guarding, no rebound  Extremities: No peripheral edema, Right AKA, stump clean and dry with no discharge  Vascular: 2+ peripheral pulses  Neurological: A/O x 3, no focal deficits  Musculoskeletal: 5/5 strength b/l upper and lower extremities  Skin: No rashes    MEDICATIONS:  MEDICATIONS  (STANDING):  aspirin  chewable 81 milliGRAM(s) Oral daily  heparin  Injectable 5000 Unit(s) SubCutaneous every 8 hours  allopurinol 100 milliGRAM(s) Oral daily  doxazosin 8 milliGRAM(s) Oral at bedtime  fenofibrate Tablet 145 milliGRAM(s) Oral daily  ferrous    sulfate 325 milliGRAM(s) Oral two times a day with meals  gabapentin 300 milliGRAM(s) Oral daily  hydrALAZINE 50 milliGRAM(s) Oral daily  isosorbide   mononitrate ER Tablet (IMDUR) 120 milliGRAM(s) Oral daily  pramipexole 0.125 milliGRAM(s) Oral daily  simvastatin 10 milliGRAM(s) Oral at bedtime  piperacillin/tazobactam IVPB. 3.375 Gram(s) IV Intermittent every 8 hours  ammonium lactate 12% Lotion 1 Application(s) Topical daily  buDESOnide   0.5 milliGRAM(s) Respule 0.5 milliGRAM(s) Inhalation two times a day  metoprolol 25 milliGRAM(s) Oral two times a day  amLODIPine   Tablet 5 milliGRAM(s) Oral daily  pantoprazole    Tablet 40 milliGRAM(s) Oral before breakfast  vancomycin  IVPB 400 milliGRAM(s) IV Intermittent every 12 hours  furosemide    Tablet 40 milliGRAM(s) Oral two times a day  loperamide 2 milliGRAM(s) Oral once  potassium chloride  10 mEq/100 mL IVPB 10 milliEquivalent(s) IV Intermittent every 1 hour  magnesium sulfate  IVPB 1 Gram(s) IV Intermittent once      LABS: All Labs Reviewed:                        8.9    5.7   )-----------( 243      ( 04 Jul 2017 05:34 )             27.6     07-04    148<H>  |  112<H>  |  41<H>  ----------------------------<  107<H>  3.0<L>   |  29  |  1.46<H>    Ca    8.1<L>      04 Jul 2017 05:34  Mg     1.7     07-04            Blood Culture:     RADIOLOGY/EKG:    DVT PPX:    ADVANCED DIRECTIVE:    DISPOSITION:

## 2017-07-04 NOTE — PROGRESS NOTE ADULT - ASSESSMENT
82 year old male with history of CAD s/p stents (LAD, RCA bare metal around 9/16),PVD (RLE stent/ angioplasty and surgical debridement by Dr Siu 5/20 ) A.Fib not on anticoagulation due to GI bleeding, Hypertension, COPD on home O2 2L, SHAYY on nocturnal BIPAP, Chronic diastolic CHF, Hyperlipidemia, PVD, Iron deficiency anemia, Chronic back pain, Gout, BPH, CKD III with baseline Cr 2.

## 2017-07-04 NOTE — PROGRESS NOTE ADULT - ASSESSMENT
stable, OK for discharge from vascular standpoint    sutures out POD #30    Wound care as out pt: change stump dressing q 3-4 days with dry gauze on incision line secured with tegaderm    R groin VAC change q 3 days

## 2017-07-04 NOTE — PROGRESS NOTE ADULT - SUBJECTIVE AND OBJECTIVE BOX
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 Patient is laying comfortably in bed, NAD, and AAOx3. Patient denies left foot/ankle pain. Pt denies N/V/F/C/SOB/CP/headaches. Patients states he continues to have diarrhea and has no abdominal pain or cramping. States his left arm is feeing better.    PMH: CAD s/p stents (LAD, RCA bare metal around 9/16),PVD (RLE stent/ angioplasty and surgical debridment by Dr Siu 5/20 ) A.Fib not on anticoagulation due to GI bleeding, Hypertension, COPD on home O2 2L, SHAYY on nocturnal BIPAP, ex-smoker (smoked 1ppd X 50 years, quit 22 years ago),  Chronic diastolic CHF, Hyperlipidemia, PVD, Iron deficiency anemia, Chronic back pain, Gout, BPH, CKD III with baseline Cr 2.     MEDICATIONS:  MEDICATIONS  (STANDING):  aspirin  chewable 81 milliGRAM(s) Oral daily  heparin  Injectable 5000 Unit(s) SubCutaneous every 8 hours  allopurinol 100 milliGRAM(s) Oral daily  doxazosin 8 milliGRAM(s) Oral at bedtime  fenofibrate Tablet 145 milliGRAM(s) Oral daily  ferrous    sulfate 325 milliGRAM(s) Oral two times a day with meals  gabapentin 300 milliGRAM(s) Oral daily  hydrALAZINE 50 milliGRAM(s) Oral daily  isosorbide   mononitrate ER Tablet (IMDUR) 120 milliGRAM(s) Oral daily  pramipexole 0.125 milliGRAM(s) Oral daily  simvastatin 10 milliGRAM(s) Oral at bedtime  piperacillin/tazobactam IVPB. 3.375 Gram(s) IV Intermittent every 8 hours  ammonium lactate 12% Lotion 1 Application(s) Topical daily  buDESOnide   0.5 milliGRAM(s) Respule 0.5 milliGRAM(s) Inhalation two times a day  metoprolol 25 milliGRAM(s) Oral two times a day  amLODIPine   Tablet 5 milliGRAM(s) Oral daily  pantoprazole    Tablet 40 milliGRAM(s) Oral before breakfast  vancomycin  IVPB 400 milliGRAM(s) IV Intermittent every 12 hours  furosemide    Tablet 40 milliGRAM(s) Oral two times a day    Allergies: NKFDA    Vital Signs Last 24 Hrs  T(C): 36.4 (04 Jul 2017 11:10), Max: 37.2 (04 Jul 2017 04:49)  T(F): 97.5 (04 Jul 2017 11:10), Max: 98.9 (04 Jul 2017 04:49)  HR: 73 (04 Jul 2017 11:10) (66 - 73)  BP: 146/52 (04 Jul 2017 11:10) (146/52 - 156/43)  BP(mean): 67 (03 Jul 2017 14:00) (67 - 73)  RR: 18 (04 Jul 2017 11:10) (17 - 18)  SpO2: 95% (04 Jul 2017 11:10) (95% - 98%)      PHYSICAL EXAM:  Constitutional: NAD, AAO x3  Extremities: Right LE AKA  Vascular: Left foot: DP and PT 1/4, CFT <4 sec x5, foot is warm to touch    Neuro: Protective sensation is decreased. Light touch sensation: intact  Derm: Preulcerative lesion noted on the postero left heel. No fluctuance, no open lesion, no active drainage, no acute clinical signs of infection, no mal-odor, no erythema, no edema. Toe nails 1-5 are well trimmed; yellow discoloration x5  MSK: No pain upon palpation of the left lower leg and foot. Compartment of the L leg and foot are non tender, and soft.     LABS: All Labs Reviewed:                        8.9    5.7   )-----------( 243      ( 04 Jul 2017 05:34 )             27.6     07-04    148<H>  |  112<H>  |  41<H>  ----------------------------<  107<H>  3.0<L>   |  29  |  1.46<H>    Ca    8.1<L>      04 Jul 2017 05:34  Mg     1.7     07-04      Blood Culture:   Culture - Blood (06.17.17 @ 03:54)    Specimen Source: .Blood Blood    Culture Results:   No growth at 5 days.    Clostridium difficile Toxin by PCR (07.02.17 @ 13:45)    Clostridium difficile Toxin by PCR: The results of this test should be interpreted with consideration of all  clinical and laboratory findings. This test determines the presence of  the C. difficile tcdB gene at a given time and is not intended to  identify antibiotic associated disease or C. difficile infection without  clinical context. Successful treatment is based on the resolution of  clinical symptoms. This test should not be used as a "test of cure"  because C. difficile DNA will persist after successful treatment. Repeat  testing will not be permitted.    This test is performed on the BD MAX system using Real-Time PCR and  fluorogenic target-specific hybridization.    C Diff by PCR Result: NotDetec      RADIOLOGY/EKG:    EXAM:  US DPLX UPR EXT VEINS LTD LT                            PROCEDURE DATE:  07/02/2017        INTERPRETATION:      Ultrasound of the upper extremity deep venous system         CLINICAL INFORMATION:      Pain and swelling, deep venous thrombosis.    TECHNIQUE:   Doppler ultrasonography of the LEFT upper extremity deep   venous system was performed.    FINDINGS:    No previous examinations are available for review.    The LEFT upper extremity deep venous system demonstrated focal   nonocclusive thrombus in the medial cubital vein.  The remaining veins   were patent with intact Doppler flow.  The flow varied with respiration   and augmented with distal arm compression.  The arm veins were directly   compressible by the ultrasound transducer.          The veins evaluated included the internal jugular vein, subclavian vein,    axillary vein, the brachial vein, the basilic vein and the cephalic vein.    A catheter is seen with in the LEFT subclavian, axillary, brachial and   basilic veins.      IMPRESSION: Focal nonocclusive thrombus within the median cubital vein.

## 2017-07-04 NOTE — PROGRESS NOTE ADULT - PROBLEM SELECTOR PLAN 5
- K at 3.0 today  Potassium, Serum: 3.0 mmol/L (07.04.17 @ 05:34)  - K repleted  - Mag at 1.7  Magnesium, Serum: 1.7 mg/dL (07.04.17 @ 09:59)  - Mag repleted  - Na @ 148:  Encouraged to increase free water intake  - Serial BMPs.

## 2017-07-04 NOTE — PROGRESS NOTE ADULT - ASSESSMENT
NEPHROLOGY INTERVAL HPI/OVERNIGHT EVENTS:  83 y/o wm with hx of CKD stage 3 ( scr in low 2's in past)  presents from [facility] with anemia with hgb of 7.7..      During course of hospitalization pt transfused and EGD was held due to his high risk.    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    PICC line placed for tx of groin revealing GNR with ENspp and corynebacteria  on Vanc and Zosyn with total of 6 weeks of abx planned.      Now with variable renal function in setting of higher vanc trough with Na values in 148.  Trial of IVF given and started this AM    7/1  doing well  more awake alert  Na better  pt has chronic hypernatremia    7/2  Hypokalemia noted along w edema of the stump, scrotum  Pt is uncomfortable  Got one dose of lasix earlier, will repeat dose in 6 hours  Kcl 40 meq po x 1    7/3   as above d/w Dr Hernandez re pts diaphoresis, will monitor, dc Dilaudid 2 mg, and iv dilaudid..  start dilaudid 1 mg po tid prn moderate to severe pain..  labs stable  edema improved w lasix will cont 40 mg po bid    7/4  Replace  k  cont Lasix 40 po bid  daily Kcl 40 meq  edema much improved

## 2017-07-04 NOTE — PROGRESS NOTE ADULT - PROBLEM SELECTOR PLAN 4
- Currently on vancomycin and have been experiencing diarrhea since Friday 6/30  - C Diff negative  - No fever

## 2017-07-04 NOTE — PROGRESS NOTE ADULT - PROBLEM SELECTOR PLAN 8
- isosorbide mononitrate 120 mg Oral daily  - Continue Simvastatin  10mg  - Continue Aspirin 81mg daily  - Continue Fenofibrate 145mg.

## 2017-07-04 NOTE — PROGRESS NOTE ADULT - PROBLEM SELECTOR PLAN 2
- s/p R AKA POD #7  - Continue Vanc  - no fever  - wound vac dressing changed on Friday 7/3  - Awaiting placement at [place] for rehab

## 2017-07-04 NOTE — PROGRESS NOTE ADULT - PROBLEM SELECTOR PLAN 3
- U/S of L UE reveals thrombosis of superficial veins of left upper extremity  - Warm compresses of affected area.

## 2017-07-04 NOTE — PROGRESS NOTE ADULT - PROBLEM SELECTOR PLAN 6
- Scrotal ultrasound benign  - Improved scrotal swelling almost back to baseline  - Given 1x Lasix 40mg.

## 2017-07-04 NOTE — PROGRESS NOTE ADULT - SUBJECTIVE AND OBJECTIVE BOX
NEPHROLOGY INTERVAL HPI/OVERNIGHT EVENTS:  81 y/o wm with hx of CKD stage 3 ( scr in low 2's in past)  presents from [facility] with anemia with hgb of 7.7..      During course of hospitalization pt transfused and EGD was held due to his high risk.    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    PICC line placed for tx of groin revealing GNR with ENspp and corynebacteria  on Vanc and Zosyn with total of 6 weeks of abx planned.      Now with variable renal function in setting of higher vanc trough with Na values in 148.  Trial of IVF given and started this AM    7/1  doing well  more awake alert  Na better  pt has chronic hypernatremia    7/3  dc planning   diaphoretic today, lethargic  BGM approx 130, bp stable afebrile..  Pts heat was on, covered under blankets, got dilaudid 2 hrs ago    7/4  feels well wants to be dc to rehab  K low  pts daughter at bedside          PAST MEDICAL & SURGICAL HISTORY:  - aniceto on cpap   - CKD stage 3 ( scr 2)  - DM  - GI bleed  - BPH s/p green light procedure  -gout  - HTN  - cad s/p pci  -COPD  - spincal stenosis  - HLD  - GERD  - PAD   - s/p rotator cuff tear      FAMILY HISTORY:  No pertinent family history in first degree relatives    MEDICATIONS  (STANDING):  aspirin  chewable 81 milliGRAM(s) Oral daily  heparin  Injectable 5000 Unit(s) SubCutaneous every 8 hours  allopurinol 100 milliGRAM(s) Oral daily  doxazosin 8 milliGRAM(s) Oral at bedtime  fenofibrate Tablet 145 milliGRAM(s) Oral daily  ferrous    sulfate 325 milliGRAM(s) Oral two times a day with meals  gabapentin 300 milliGRAM(s) Oral daily  hydrALAZINE 50 milliGRAM(s) Oral daily  isosorbide   mononitrate ER Tablet (IMDUR) 120 milliGRAM(s) Oral daily  pramipexole 0.125 milliGRAM(s) Oral daily  simvastatin 10 milliGRAM(s) Oral at bedtime  piperacillin/tazobactam IVPB. 3.375 Gram(s) IV Intermittent every 8 hours  ammonium lactate 12% Lotion 1 Application(s) Topical daily  buDESOnide   0.5 milliGRAM(s) Respule 0.5 milliGRAM(s) Inhalation two times a day  metoprolol 25 milliGRAM(s) Oral two times a day  amLODIPine   Tablet 5 milliGRAM(s) Oral daily  pantoprazole    Tablet 40 milliGRAM(s) Oral before breakfast  vancomycin  IVPB 400 milliGRAM(s) IV Intermittent every 12 hours  furosemide    Tablet 40 milliGRAM(s) Oral two times a day    MEDICATIONS  (PRN):  acetaminophen   Tablet. 500 milliGRAM(s) Oral every 6 hours PRN Mild Pain (1 - 3)  ALBUTerol    90 MICROgram(s) HFA Inhaler 2 Puff(s) Inhalation every 6 hours PRN Shortness of Breath  HYDROmorphone   Tablet 1 milliGRAM(s) Oral three times a day PRN Moderate to Severe Pain (4 - 10)        Allergies    No Known Allergies    Intolerances    Vital Signs Last 24 Hrs  T(C): 36.4 (04 Jul 2017 11:10), Max: 37.2 (04 Jul 2017 04:49)  T(F): 97.5 (04 Jul 2017 11:10), Max: 98.9 (04 Jul 2017 04:49)  HR: 73 (04 Jul 2017 11:10) (66 - 73)  BP: 146/52 (04 Jul 2017 11:10) (146/52 - 155/36)  BP(mean): 67 (03 Jul 2017 14:00) (67 - 67)  RR: 18 (04 Jul 2017 11:10) (18 - 18)  SpO2: 95% (04 Jul 2017 11:10) (95% - 98%)      I&O's Summary    03 Jul 2017 07:01  -  04 Jul 2017 07:00  --------------------------------------------------------  IN: 220 mL / OUT: 1200 mL / NET: -980 mL    04 Jul 2017 07:01  -  04 Jul 2017 12:03  --------------------------------------------------------  IN: 0 mL / OUT: 250 mL / NET: -250 mL              PHYSICAL EXAM:  GEN: alert awake O X 3  HEENT: MMM  NECK supple no jvd  CV: RRR s1s2  LUNGS: b/l CTA  ABD: + soft,   EXT: left 1+ edema  right AKA, edema  less scrotal edema                            8.9    5.7   )-----------( 243      ( 04 Jul 2017 05:34 )             27.6     07-04    148<H>  |  112<H>  |  41<H>  ----------------------------<  107<H>  3.0<L>   |  29  |  1.46<H>    Ca    8.1<L>      04 Jul 2017 05:34  Mg     1.7     07-04

## 2017-07-05 LAB
ANION GAP SERPL CALC-SCNC: 5 MMOL/L — SIGNIFICANT CHANGE UP (ref 5–17)
ANION GAP SERPL CALC-SCNC: 5 MMOL/L — SIGNIFICANT CHANGE UP (ref 5–17)
BUN SERPL-MCNC: 38 MG/DL — HIGH (ref 7–23)
BUN SERPL-MCNC: 40 MG/DL — HIGH (ref 7–23)
CALCIUM SERPL-MCNC: 7.8 MG/DL — LOW (ref 8.5–10.1)
CALCIUM SERPL-MCNC: 8.2 MG/DL — LOW (ref 8.5–10.1)
CHLORIDE SERPL-SCNC: 110 MMOL/L — HIGH (ref 96–108)
CHLORIDE SERPL-SCNC: 111 MMOL/L — HIGH (ref 96–108)
CO2 SERPL-SCNC: 30 MMOL/L — SIGNIFICANT CHANGE UP (ref 22–31)
CO2 SERPL-SCNC: 32 MMOL/L — HIGH (ref 22–31)
CREAT SERPL-MCNC: 1.43 MG/DL — HIGH (ref 0.5–1.3)
CREAT SERPL-MCNC: 1.44 MG/DL — HIGH (ref 0.5–1.3)
GLUCOSE SERPL-MCNC: 103 MG/DL — HIGH (ref 70–99)
GLUCOSE SERPL-MCNC: 113 MG/DL — HIGH (ref 70–99)
HCT VFR BLD CALC: 27.6 % — LOW (ref 39–50)
HGB BLD-MCNC: 8.9 G/DL — LOW (ref 13–17)
MCHC RBC-ENTMCNC: 28.8 PG — SIGNIFICANT CHANGE UP (ref 27–34)
MCHC RBC-ENTMCNC: 32.1 GM/DL — SIGNIFICANT CHANGE UP (ref 32–36)
MCV RBC AUTO: 89.7 FL — SIGNIFICANT CHANGE UP (ref 80–100)
PLATELET # BLD AUTO: 224 K/UL — SIGNIFICANT CHANGE UP (ref 150–400)
POTASSIUM SERPL-MCNC: 3.6 MMOL/L — SIGNIFICANT CHANGE UP (ref 3.5–5.3)
POTASSIUM SERPL-MCNC: 3.6 MMOL/L — SIGNIFICANT CHANGE UP (ref 3.5–5.3)
POTASSIUM SERPL-SCNC: 3.6 MMOL/L — SIGNIFICANT CHANGE UP (ref 3.5–5.3)
POTASSIUM SERPL-SCNC: 3.6 MMOL/L — SIGNIFICANT CHANGE UP (ref 3.5–5.3)
RBC # BLD: 3.07 M/UL — LOW (ref 4.2–5.8)
RBC # FLD: 17.1 % — HIGH (ref 10.3–14.5)
SODIUM SERPL-SCNC: 145 MMOL/L — SIGNIFICANT CHANGE UP (ref 135–145)
SODIUM SERPL-SCNC: 148 MMOL/L — HIGH (ref 135–145)
VANCOMYCIN TROUGH SERPL-MCNC: 18.5 UG/ML — SIGNIFICANT CHANGE UP (ref 10–20)
WBC # BLD: 6.1 K/UL — SIGNIFICANT CHANGE UP (ref 3.8–10.5)
WBC # FLD AUTO: 6.1 K/UL — SIGNIFICANT CHANGE UP (ref 3.8–10.5)

## 2017-07-05 RX ORDER — LACTOBACILLUS ACIDOPHILUS 100MM CELL
1 CAPSULE ORAL DAILY
Qty: 0 | Refills: 0 | Status: DISCONTINUED | OUTPATIENT
Start: 2017-07-05 | End: 2017-07-08

## 2017-07-05 RX ORDER — LOPERAMIDE HCL 2 MG
2 TABLET ORAL ONCE
Qty: 0 | Refills: 0 | Status: COMPLETED | OUTPATIENT
Start: 2017-07-05 | End: 2017-07-05

## 2017-07-05 RX ADMIN — Medication 25 MILLIGRAM(S): at 17:08

## 2017-07-05 RX ADMIN — Medication 100 MILLIGRAM(S): at 11:04

## 2017-07-05 RX ADMIN — Medication 81 MILLIGRAM(S): at 11:04

## 2017-07-05 RX ADMIN — PIPERACILLIN AND TAZOBACTAM 25 GRAM(S): 4; .5 INJECTION, POWDER, LYOPHILIZED, FOR SOLUTION INTRAVENOUS at 02:01

## 2017-07-05 RX ADMIN — Medication 1 APPLICATION(S): at 11:04

## 2017-07-05 RX ADMIN — Medication 40 MILLIGRAM(S): at 06:05

## 2017-07-05 RX ADMIN — Medication 0.5 MILLIGRAM(S): at 08:17

## 2017-07-05 RX ADMIN — Medication 100 MILLIGRAM(S): at 17:07

## 2017-07-05 RX ADMIN — Medication 325 MILLIGRAM(S): at 08:13

## 2017-07-05 RX ADMIN — PANTOPRAZOLE SODIUM 40 MILLIGRAM(S): 20 TABLET, DELAYED RELEASE ORAL at 06:06

## 2017-07-05 RX ADMIN — HEPARIN SODIUM 5000 UNIT(S): 5000 INJECTION INTRAVENOUS; SUBCUTANEOUS at 14:05

## 2017-07-05 RX ADMIN — Medication 145 MILLIGRAM(S): at 11:04

## 2017-07-05 RX ADMIN — HEPARIN SODIUM 5000 UNIT(S): 5000 INJECTION INTRAVENOUS; SUBCUTANEOUS at 21:13

## 2017-07-05 RX ADMIN — Medication 2 MILLIGRAM(S): at 02:00

## 2017-07-05 RX ADMIN — Medication 100 MILLIGRAM(S): at 06:05

## 2017-07-05 RX ADMIN — PIPERACILLIN AND TAZOBACTAM 25 GRAM(S): 4; .5 INJECTION, POWDER, LYOPHILIZED, FOR SOLUTION INTRAVENOUS at 23:51

## 2017-07-05 RX ADMIN — ISOSORBIDE MONONITRATE 120 MILLIGRAM(S): 60 TABLET, EXTENDED RELEASE ORAL at 11:05

## 2017-07-05 RX ADMIN — Medication 50 MILLIGRAM(S): at 06:06

## 2017-07-05 RX ADMIN — PRAMIPEXOLE DIHYDROCHLORIDE 0.12 MILLIGRAM(S): 0.12 TABLET ORAL at 11:05

## 2017-07-05 RX ADMIN — Medication 1 TABLET(S): at 11:05

## 2017-07-05 RX ADMIN — Medication 8 MILLIGRAM(S): at 21:12

## 2017-07-05 RX ADMIN — PIPERACILLIN AND TAZOBACTAM 25 GRAM(S): 4; .5 INJECTION, POWDER, LYOPHILIZED, FOR SOLUTION INTRAVENOUS at 17:07

## 2017-07-05 RX ADMIN — HYDROMORPHONE HYDROCHLORIDE 1 MILLIGRAM(S): 2 INJECTION INTRAMUSCULAR; INTRAVENOUS; SUBCUTANEOUS at 17:07

## 2017-07-05 RX ADMIN — Medication 325 MILLIGRAM(S): at 17:07

## 2017-07-05 RX ADMIN — SIMVASTATIN 10 MILLIGRAM(S): 20 TABLET, FILM COATED ORAL at 21:12

## 2017-07-05 RX ADMIN — Medication 25 MILLIGRAM(S): at 06:06

## 2017-07-05 RX ADMIN — Medication 40 MILLIGRAM(S): at 17:07

## 2017-07-05 RX ADMIN — GABAPENTIN 300 MILLIGRAM(S): 400 CAPSULE ORAL at 11:05

## 2017-07-05 RX ADMIN — PIPERACILLIN AND TAZOBACTAM 25 GRAM(S): 4; .5 INJECTION, POWDER, LYOPHILIZED, FOR SOLUTION INTRAVENOUS at 11:06

## 2017-07-05 RX ADMIN — HYDROMORPHONE HYDROCHLORIDE 1 MILLIGRAM(S): 2 INJECTION INTRAMUSCULAR; INTRAVENOUS; SUBCUTANEOUS at 02:05

## 2017-07-05 RX ADMIN — Medication 0.5 MILLIGRAM(S): at 19:26

## 2017-07-05 RX ADMIN — AMLODIPINE BESYLATE 5 MILLIGRAM(S): 2.5 TABLET ORAL at 06:06

## 2017-07-05 RX ADMIN — HEPARIN SODIUM 5000 UNIT(S): 5000 INJECTION INTRAVENOUS; SUBCUTANEOUS at 06:05

## 2017-07-05 RX ADMIN — Medication 2 MILLIGRAM(S): at 23:52

## 2017-07-05 RX ADMIN — Medication 100 MILLIEQUIVALENT(S): at 00:50

## 2017-07-05 NOTE — PROGRESS NOTE ADULT - ASSESSMENT
82 year old male patient s/p [id number] RLE (6/27/17) is seen and evaluated for:    1. Pre-ulcerative lesion Left plantar heel; No clinical signs of infection noted  2. Other issues PVD; s/p  AKA    P:  Pt was seen and examined. Plan was discussed with attending Dr. Fermin.  Cavillon applied to preulcerative lesion on left heel and Z-flex boot reapplied.  Z-flex re-applied to left foot; patient to wear boot at all times while in bed  Cont. IV abx per ID, appreciated.  Care per Medicine and Vascular surgery appreciated.  Pt stable for discharge from Podiatry standpoint.  Podiatry to follow while in house.

## 2017-07-05 NOTE — PROGRESS NOTE ADULT - PROBLEM SELECTOR PLAN 5
- K at 3.0 today  Potassium, Serum: 3.0 mmol/L (07.04.17 @ 05:34)  Potassium, Serum: 3.6 mmol/L (07.05.17 @ 05:05)    - K repleted  - Mag at 1.7  Magnesium, Serum: 1.7 mg/dL (07.04.17 @ 09:59)  - Mag repleted  - Na @ 148:  Encouraged to increase free water intake  - Serial BMPs.

## 2017-07-05 NOTE — PROGRESS NOTE ADULT - SUBJECTIVE AND OBJECTIVE BOX
NEPHROLOGY INTERVAL HPI/OVERNIGHT EVENTS:  7/5 SY  Resting fairly comfortably.  No complaints.    83 y/o wm with hx of CKD stage 3 ( scr in low 2's in past)  presents from [facility] with anemia with hgb of 7.7..      During course of hospitalizeation, pt transfused and EGD was held due to his high risk.    Noted with leg ulcer that was wornseing and with rt groin wound debridement with s/p now AKA earlier this week.    PICC line placed for tx of malcolm revealing GNR with ENspp and corynebacteriam.  on Vanc and Zosyn with total of 6 weeks of abx planned.      Now with variable renal function in setting of higher vanc trough with Na values in 148.  Trial of IVF given and started this AM      MEDICATIONS  (STANDING):  aspirin  chewable 81 milliGRAM(s) Oral daily  heparin  Injectable 5000 Unit(s) SubCutaneous every 8 hours  allopurinol 100 milliGRAM(s) Oral daily  doxazosin 8 milliGRAM(s) Oral at bedtime  fenofibrate Tablet 145 milliGRAM(s) Oral daily  ferrous    sulfate 325 milliGRAM(s) Oral two times a day with meals  gabapentin 300 milliGRAM(s) Oral daily  hydrALAZINE 50 milliGRAM(s) Oral daily  isosorbide   mononitrate ER Tablet (IMDUR) 120 milliGRAM(s) Oral daily  pramipexole 0.125 milliGRAM(s) Oral daily  simvastatin 10 milliGRAM(s) Oral at bedtime  piperacillin/tazobactam IVPB. 3.375 Gram(s) IV Intermittent every 8 hours  ammonium lactate 12% Lotion 1 Application(s) Topical daily  buDESOnide   0.5 milliGRAM(s) Respule 0.5 milliGRAM(s) Inhalation two times a day  metoprolol 25 milliGRAM(s) Oral two times a day  amLODIPine   Tablet 5 milliGRAM(s) Oral daily  pantoprazole    Tablet 40 milliGRAM(s) Oral before breakfast  vancomycin  IVPB 400 milliGRAM(s) IV Intermittent every 12 hours  furosemide    Tablet 40 milliGRAM(s) Oral two times a day  lactobacillus acidophilus 1 Tablet(s) Oral daily    MEDICATIONS  (PRN):  acetaminophen   Tablet. 500 milliGRAM(s) Oral every 6 hours PRN Mild Pain (1 - 3)  ALBUTerol    90 MICROgram(s) HFA Inhaler 2 Puff(s) Inhalation every 6 hours PRN Shortness of Breath  HYDROmorphone   Tablet 1 milliGRAM(s) Oral three times a day PRN Moderate to Severe Pain (4 - 10)          Vital Signs Last 24 Hrs  T(C): 37.3 (05 Jul 2017 05:45), Max: 37.3 (05 Jul 2017 05:45)  T(F): 99.2 (05 Jul 2017 05:45), Max: 99.2 (05 Jul 2017 05:45)  HR: 62 (05 Jul 2017 08:18) (58 - 73)  BP: 163/60 (05 Jul 2017 06:00) (146/52 - 185/48)  BP(mean): --  RR: 17 (04 Jul 2017 17:16) (17 - 18)  SpO2: 99% (05 Jul 2017 05:45) (95% - 100%)  Daily     Daily     07-04 @ 07:01  -  07-05 @ 07:00  --------------------------------------------------------  IN: 400 mL / OUT: 650 mL / NET: -250 mL        PHYSICAL EXAM:  GENERAL:   CHEST/LUNG:   HEART:   ABDOMEN:   EXTREMITIES:   SKIN:     LABS:                        8.9    6.1   )-----------( 224      ( 05 Jul 2017 05:05 )             27.6     07-05    145  |  110<H>  |  38<H>  ----------------------------<  103<H>  3.6   |  30  |  1.43<H>    Ca    8.2<L>      05 Jul 2017 05:05  Mg     1.7     07-04                  RADIOLOGY & ADDITIONAL TESTS:

## 2017-07-05 NOTE — PROGRESS NOTE ADULT - SUBJECTIVE AND OBJECTIVE BOX
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 Patient is laying comfortably in bed, NAD, and AAOx3. Patient denies any left foot/ankle pain. Pt denies N/V/F/C/SOB/CP/headaches.        MEDICATIONS  (STANDING):  aspirin  chewable 81 milliGRAM(s) Oral daily  heparin  Injectable 5000 Unit(s) SubCutaneous every 8 hours  allopurinol 100 milliGRAM(s) Oral daily  doxazosin 8 milliGRAM(s) Oral at bedtime  fenofibrate Tablet 145 milliGRAM(s) Oral daily  ferrous    sulfate 325 milliGRAM(s) Oral two times a day with meals  gabapentin 300 milliGRAM(s) Oral daily  hydrALAZINE 50 milliGRAM(s) Oral daily  isosorbide   mononitrate ER Tablet (IMDUR) 120 milliGRAM(s) Oral daily  pramipexole 0.125 milliGRAM(s) Oral daily  simvastatin 10 milliGRAM(s) Oral at bedtime  piperacillin/tazobactam IVPB. 3.375 Gram(s) IV Intermittent every 8 hours  ammonium lactate 12% Lotion 1 Application(s) Topical daily  buDESOnide   0.5 milliGRAM(s) Respule 0.5 milliGRAM(s) Inhalation two times a day  metoprolol 25 milliGRAM(s) Oral two times a day  amLODIPine   Tablet 5 milliGRAM(s) Oral daily  pantoprazole    Tablet 40 milliGRAM(s) Oral before breakfast  vancomycin  IVPB 400 milliGRAM(s) IV Intermittent every 12 hours  furosemide    Tablet 40 milliGRAM(s) Oral two times a day    MEDICATIONS  (PRN):  acetaminophen   Tablet. 500 milliGRAM(s) Oral every 6 hours PRN Mild Pain (1 - 3)  ALBUTerol    90 MICROgram(s) HFA Inhaler 2 Puff(s) Inhalation every 6 hours PRN Shortness of Breath  HYDROmorphone   Tablet 1 milliGRAM(s) Oral three times a day PRN Moderate to Severe Pain (4 - 10)    Allergies: NKFDA    Vital Signs Last 24 Hrs  T(C): 37.3 (05 Jul 2017 05:45), Max: 37.3 (05 Jul 2017 05:45)  T(F): 99.2 (05 Jul 2017 05:45), Max: 99.2 (05 Jul 2017 05:45)  HR: 62 (05 Jul 2017 08:18) (58 - 73)  BP: 163/60 (05 Jul 2017 06:00) (146/52 - 185/48)  BP(mean): --  RR: 17 (04 Jul 2017 17:16) (17 - 18)  SpO2: 99% (05 Jul 2017 05:45) (95% - 100%)    PHYSICAL EXAM:  Constitutional: NAD, AAO x3  Extremities: Right LE AKA  Vascular: Left foot: DP and PT 1/4, CFT <4 sec x5, foot is warm to touch, no edema, no varicosity, pedal hair absent     Neuro: Protective sensation is decreased. Light touch sensation: intact  Derm: Preulcerative lesion noted on the postero left heel with black discoloration; firm not boggy. No fluctuance, no open lesion, no active drainage, no mal-odor, no erythema, no edema, no acute clinical signs of infection. No interdigital maceration, toe nails 1-5 are well trimmed; yellow discoloration x5  MSK: No pain upon palpation of the left lower leg and foot. Compartment of the L leg and foot are non tender, and soft.     Labs: All labs reviewed:                        8.9    6.1   )-----------( 224      ( 05 Jul 2017 05:05 )             27.6     05 Jul 2017 05:05    145    |  110    |  38     ----------------------------<  103    3.6     |  30     |  1.43     Ca    8.2        05 Jul 2017 05:05    Blood Culture:   Culture - Blood (06.17.17 @ 03:54)    Specimen Source: .Blood Blood    Culture Results:   No growth at 5 days.    Clostridium difficile Toxin by PCR (07.02.17 @ 13:45)    C Diff by PCR Result: Not detected      RADIOLOGY:    EXAM:  US DPLX UPR EXT VEINS LTD LT                            PROCEDURE DATE:  07/02/2017        INTERPRETATION:      Ultrasound of the upper extremity deep venous system         CLINICAL INFORMATION:      Pain and swelling, deep venous thrombosis.    TECHNIQUE:   Doppler ultrasonography of the LEFT upper extremity deep   venous system was performed.    FINDINGS:    No previous examinations are available for review.    The LEFT upper extremity deep venous system demonstrated focal   nonocclusive thrombus in the medial cubital vein.  The remaining veins   were patent with intact Doppler flow.  The flow varied with respiration   and augmented with distal arm compression.  The arm veins were directly   compressible by the ultrasound transducer.          The veins evaluated included the internal jugular vein, subclavian vein,    axillary vein, the brachial vein, the basilic vein and the cephalic vein.    A catheter is seen with in the LEFT subclavian, axillary, brachial and   basilic veins.      IMPRESSION: Focal nonocclusive thrombus within the median cubital vein.

## 2017-07-05 NOTE — PROGRESS NOTE ADULT - PROBLEM SELECTOR PLAN 2
- s/p R AKA POD #8  - Continue Vanc  - no fever  - wound vac dressing changed on Friday 7/3  - Awaiting placement at [place] for rehab

## 2017-07-05 NOTE — PROGRESS NOTE ADULT - SUBJECTIVE AND OBJECTIVE BOX
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 He has gradual onset  progressive fatigue over the last couple of weeks. Patient is unable to ambulate because of his leg ulcers. In ER, he was started on vancomycin IV and zosyn.    s/p right leg AKA  Lying in bed in NAD  Denies pain  Alert  Wound VAC in place    MEDICATIONS  (STANDING):  aspirin  chewable 81 milliGRAM(s) Oral daily  heparin  Injectable 5000 Unit(s) SubCutaneous every 8 hours  allopurinol 100 milliGRAM(s) Oral daily  doxazosin 8 milliGRAM(s) Oral at bedtime  fenofibrate Tablet 145 milliGRAM(s) Oral daily  ferrous    sulfate 325 milliGRAM(s) Oral two times a day with meals  gabapentin 300 milliGRAM(s) Oral daily  hydrALAZINE 50 milliGRAM(s) Oral daily  isosorbide   mononitrate ER Tablet (IMDUR) 120 milliGRAM(s) Oral daily  pramipexole 0.125 milliGRAM(s) Oral daily  simvastatin 10 milliGRAM(s) Oral at bedtime  piperacillin/tazobactam IVPB. 3.375 Gram(s) IV Intermittent every 8 hours  ammonium lactate 12% Lotion 1 Application(s) Topical daily  buDESOnide   0.5 milliGRAM(s) Respule 0.5 milliGRAM(s) Inhalation two times a day  metoprolol 25 milliGRAM(s) Oral two times a day  amLODIPine   Tablet 5 milliGRAM(s) Oral daily  pantoprazole    Tablet 40 milliGRAM(s) Oral before breakfast  vancomycin  IVPB 400 milliGRAM(s) IV Intermittent every 12 hours  furosemide    Tablet 40 milliGRAM(s) Oral two times a day    MEDICATIONS  (PRN):  acetaminophen   Tablet. 500 milliGRAM(s) Oral every 6 hours PRN Mild Pain (1 - 3)  ALBUTerol    90 MICROgram(s) HFA Inhaler 2 Puff(s) Inhalation every 6 hours PRN Shortness of Breath  HYDROmorphone   Tablet 1 milliGRAM(s) Oral three times a day PRN Moderate to Severe Pain (4 - 10)      Vital Signs Last 24 Hrs  T(C): 37.3 (2017 05:45), Max: 37.3 (2017 05:45)  T(F): 99.2 (2017 05:45), Max: 99.2 (2017 05:45)  HR: 62 (2017 08:18) (58 - 73)  BP: 163/60 (2017 06:00) (146/52 - 185/48)  BP(mean): --  RR: 17 (2017 17:16) (17 - 18)  SpO2: 99% (2017 05:45) (95% - 100%)    Physical Exam:        Constitutional: frail looking  HEENT: NC/AT, EOMI, PERRLA  Neck: supple  Back: no tenderness  Respiratory: decreased BS at bases  Cardiovascular: S1S2 regular, no murmurs  Abdomen: soft, not tender, not distended, positive BS  Genitourinary: right inguinal open wound packed  Rectal: deferred  Musculoskeletal: no muscle tenderness, no joint swelling or tenderness  Extremities: 1+ pedal edema; right inguinal open wound with VAC; right AKA  Neurological: AxOx3, moving all extremities, no focal deficits  Skin: no rashes    Labs:                        8.9    6.1   )-----------( 224      ( 2017 05:05 )             27.6     07    145  |  110<H>  |  38<H>  ----------------------------<  103<H>  3.6   |  30  |  1.43<H>    Ca    8.2<L>      2017 05:05  Mg     1.7              Vancomycin Level, Trough: 18.5 ug/mL ( @ 05:05)      Cultures:                         9.0    5.8   )-----------( 226      ( 2017 07:14 )             28.2     07    147<H>  |  113<H>  |  41<H>  ----------------------------<  99  3.8   |  26  |  1.55<H>    Ca    8.2<L>      2017 07:14         Vancomycin Level, Trough: 18.1 ug/mL ( @ 07:14)  Vancomycin Level, Trough: 20.3 ug/mL ( @ 04:39)      Cultures:                         8.0    5.9   )-----------( 227      ( 2017 05:45 )             24.8         148<H>  |  114<H>  |  35<H>  ----------------------------<  121<H>  3.6   |  26  |  1.38<H>    Ca    7.8<L>      2017 05:45         Vancomycin Level, Trough: 20.1 ug/mL ( @ 05:22)      Cultures:                         8.7    8.9   )-----------( 254      ( 2017 06:04 )             27.0         143  |  x   |  x   ----------------------------<  x   x    |  x   |  x     Ca    7.9<L>      2017 06:04         Vancomycin Level, Trough: 20.1 ug/mL ( @ 05:22)                          8.7    8.9   )-----------( 254      ( 2017 06:04 )             27.0     06-    150<H>  |  115<H>  |  32<H>  ----------------------------<  69<L>  3.8   |  22  |  1.40<H>    Ca    7.9<L>      2017 06:04                          8.6    7.2   )-----------( 284      ( 2017 07:16 )             26.6     06-26    145  |  112<H>  |  30<H>  ----------------------------<  98  3.9   |  27  |  1.25    Ca    7.8<L>      2017 07:16                          10.2   8.0   )-----------( 366      ( 2017 06:22 )             32.7     06-    148<H>  |  115<H>  |  29<H>  ----------------------------<  133<H>  3.8   |  23  |  1.29    Ca    7.7<L>      2017 06:22                          8.3    6.5   )-----------( 367      ( 2017 06:25 )             25.6     06-    146<H>  |  113<H>  |  32<H>  ----------------------------<  118<H>  3.2<L>   |  27  |  1.33<H>    Ca    7.9<L>      2017 06:25         Vancomycin Level, Trough: 14.2 ug/mL ( @ 05:00)      Cultures:                       8.1    7.0   )-----------( 331      ( 2017 05:00 )             25.2     06-19    151<H>  |  116<H>  |  40<H>  ----------------------------<  97  3.6   |  27  |  1.38<H>    Ca    8.1<L>      2017 05:00         Vancomycin Level, Trough: 14.2 ug/mL ( @ 05:00)                          8.2    8.5   )-----------( 312      ( 2017 03:54 )             25.0     06-    148<H>  |  114<H>  |  53<H>  ----------------------------<  129<H>  3.8   |  28  |  1.65<H>    Ca    8.2<L>      2017 03:54  Phos  1.9       Mg     2.1         TPro  5.2<L>  /  Alb  1.7<L>  /  TBili  0.3  /  DBili  x   /  AST  20  /  ALT  12  /  AlkPhos  57       LIVER FUNCTIONS - ( 2017 03:54 )  Alb: 1.7 g/dL / Pro: 5.2 gm/dL / ALK PHOS: 57 U/L / ALT: 12 U/L / AST: 20 U/L / GGT: x           Urinalysis Basic - ( 2017 10:36 )    Color: Yellow / Appearance: Clear / S.010 / pH: x  Gluc: x / Ketone: Negative  / Bili: Negative / Urobili: Negative mg/dL   Blood: x / Protein: 15 mg/dL / Nitrite: Negative   Culture - Other (17 @ 12:50)    Specimen Source: .Other right groin wound    Culture Results:   Moderate Mixed gram negative rods  Few Enterococcus species  Rare Corynebacterium species    Leuk Esterase: Trace / RBC: x / WBC 0-2   Sq Epi: x / Non Sq Epi: x / Bacteria: x    Culture - Blood (17 @ 03:54)    Specimen Source: .Blood Blood    Culture Results:   No growth to date.    Culture - Other (17 @ 12:50)    Specimen Source: .Other right groin wound    Culture Results:   Moderate Mixed gram negative rods  Few Enterococcus species          Radiology:    Right foot MRI:  1.  Large skin ulcer at the posterior aspect of the heel.  2.  Fluid tracking from the skin ulcer towards the medial calcaneus   suspicious for small abscess.  3.  Osteomyelitis of the posterior calcaneus.  4.  Osteonecrosis within the posterior calcaneus.  5.  Partial tearing of the lateral Achilles insertion.  6.  Full-thickness tearing of the lateral cord of the plantar fascia with   partial tear of the central cord.    Advanced directives addressed: full resuscitation

## 2017-07-05 NOTE — PROGRESS NOTE ADULT - PROBLEM SELECTOR PLAN 1
- Current Hg= 8.9  - Hemodynamically stable  Hemoglobin: 8.9 g/dL (07.04.17 @ 05:34)  Hemoglobin: 8.9 g/dL (07.05.17 @ 05:05)

## 2017-07-05 NOTE — PROGRESS NOTE ADULT - SUBJECTIVE AND OBJECTIVE BOX
CHIEF COMPLAINT: Sent from [facility] rehab because of low hemoglobin 7.7    SUBJECTIVE: Patient seen and examined with DrsDesmond Shearer, Dragan Fisher, Maik Le, Mariel Bell on the Family Medicine Teaching Service.  Agree with history, physical, labs and plan which were reviewed in detail.      82 year old male with history of CAD s/p stents (LAD, RCA bare metal around 9/16),PVD (RLE stent/ angioplasty and surgical debridment by Dr Siu 5/20 ) A.Fib not on anticoagulation due to GI bleeding, Hypertension, COPD on home O2 2L, SHAYY on nocturnal BIPAP, ex-smoker (smoked 1ppd X 50 years, quit 22 years ago),  Chronic diastolic CHF, Hyperlipidemia, PVD, Iron deficiency anemia, Chronic back pain, Gout, BPH, CKD III with baseline Cr 2. Was ecently admitted to  on  4/17 with anemia of GI bleeding, RLE and RUE DVTs,( received pRBCs and IVC filter discharged to rehab with aspirin) was readmitted to   ER on 5/4/17 for worsening anemia with Hb 6, worsening leg pain from ulcers and worsening renal function Cr 3.99 now presents from [facility] with anemia (7.7 on labs today decreasing from 9.5 over past few weeks). Pt c/o gradual onset  progressive fatigue over the last couple of weeks. Patient is unable to ambulate because of his leg cellulitis and ulcers. He denies any shortness of breath, palpitations / chest pain / light headedness. According to the daughter the attendants at [facility] did notice dark stools for the last couple of days. Patient denies any abdominal pain or change in bowel or urinary habits.  In ED, + guaiac.     7/4/17  Patient is s/p Right AKA POD # 7  for f/u pre-ulcerative lesion of the left heel. Patient seen with daughter and family at bedside.  Patient complains of pain in left arm which is tender to the touch.  States he is having soft bowel movements. Denies any abdominal pain. Denies any constipation or nausea or vomiting. no sweats, no chills, no fever.    7/5/17  Patient seen and examined at bedside with daughter present.  States bowel movements have slowed down in the morning. No abdominal pain, no fever, no sweats or chills.      REVIEW OF SYSTEMS:    CONSTITUTIONAL: No weakness, fevers or chills  EYES/ENT: No visual changes;  No vertigo or throat pain   NECK: No pain or stiffness  RESPIRATORY: No cough, wheezing, hemoptysis; No shortness of breath  CARDIOVASCULAR: No chest pain or palpitations  GASTROINTESTINAL: No abdominal or epigastric pain. No nausea, vomiting, or hematemesis; No constipation. No melena or hematochezia.  GENITOURINARY: No dysuria, frequency or hematuria  NEUROLOGICAL: No numbness or weakness  SKIN: No itching, burning, rashes, or lesions   All other review of systems is negative unless indicated above    Vital Signs Last 24 Hrs  T(C): 37.3 (05 Jul 2017 16:35), Max: 37.3 (05 Jul 2017 05:45)  T(F): 99.1 (05 Jul 2017 16:35), Max: 99.2 (05 Jul 2017 05:45)  HR: 66 (05 Jul 2017 16:35) (60 - 69)  BP: 172/34 (05 Jul 2017 16:35) (155/48 - 185/48)  BP(mean): --  RR: 18 (05 Jul 2017 10:29) (18 - 18)  SpO2: 99% (05 Jul 2017 16:35) (99% - 100%)    I&O's Summary    04 Jul 2017 07:01  -  05 Jul 2017 07:00  --------------------------------------------------------  IN: 400 mL / OUT: 650 mL / NET: -250 mL    05 Jul 2017 07:01  -  05 Jul 2017 18:24  --------------------------------------------------------  IN: 0 mL / OUT: 500 mL / NET: -500 mL        CAPILLARY BLOOD GLUCOSE          PHYSICAL EXAM:    Constitutional: NAD, awake and alert, well-developed  HEENT: PERR, EOMI, Normal Hearing, MMM  Neck: Soft and supple, No LAD, No JVD  Respiratory: Breath sounds are clear bilaterally, No wheezing, rales or rhonchi  Cardiovascular: S1 and S2, regular rate and rhythm, no Murmurs, gallops or rubs  Gastrointestinal: Bowel Sounds present, soft, nontender, nondistended, no guarding, no rebound  Extremities: Right above knee amputation stump with no erythema or discharge, wound is healing well  Vascular: 2+ peripheral pulses  Neurological: A/O x 3, no focal deficits  Musculoskeletal: 5/5 strength b/l upper and lower extremities  Skin: No rashes    MEDICATIONS:  MEDICATIONS  (STANDING):  aspirin  chewable 81 milliGRAM(s) Oral daily  heparin  Injectable 5000 Unit(s) SubCutaneous every 8 hours  allopurinol 100 milliGRAM(s) Oral daily  doxazosin 8 milliGRAM(s) Oral at bedtime  fenofibrate Tablet 145 milliGRAM(s) Oral daily  ferrous    sulfate 325 milliGRAM(s) Oral two times a day with meals  gabapentin 300 milliGRAM(s) Oral daily  hydrALAZINE 50 milliGRAM(s) Oral daily  isosorbide   mononitrate ER Tablet (IMDUR) 120 milliGRAM(s) Oral daily  pramipexole 0.125 milliGRAM(s) Oral daily  simvastatin 10 milliGRAM(s) Oral at bedtime  piperacillin/tazobactam IVPB. 3.375 Gram(s) IV Intermittent every 8 hours  ammonium lactate 12% Lotion 1 Application(s) Topical daily  buDESOnide   0.5 milliGRAM(s) Respule 0.5 milliGRAM(s) Inhalation two times a day  metoprolol 25 milliGRAM(s) Oral two times a day  amLODIPine   Tablet 5 milliGRAM(s) Oral daily  pantoprazole    Tablet 40 milliGRAM(s) Oral before breakfast  vancomycin  IVPB 400 milliGRAM(s) IV Intermittent every 12 hours  furosemide    Tablet 40 milliGRAM(s) Oral two times a day  lactobacillus acidophilus 1 Tablet(s) Oral daily      LABS: All Labs Reviewed:                        8.9    6.1   )-----------( 224      ( 05 Jul 2017 05:05 )             27.6     07-05    145  |  110<H>  |  38<H>  ----------------------------<  103<H>  3.6   |  30  |  1.43<H>    Ca    8.2<L>      05 Jul 2017 05:05  Mg     1.7     07-04            Blood Culture:     RADIOLOGY/EKG:    DVT PPX:    ADVANCED DIRECTIVE:    DISPOSITION:

## 2017-07-05 NOTE — ADVANCED PRACTICE NURSE CONSULT - ASSESSMENT
This is an 82 year old male that was admitted to the hospital on 6/16/2017 for GI bleed and anemia. 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    In to change wound vac dressing to Right inguinal wound. Patient presents on a Synergy air elite low airloss mattress on his backside. LLE in z-flex boot.     Old dressing removed to right groin. Wound irrigated with normal saline. Wound bed with 100% pink granulation tissue. Wound measures [id number].  Periwound intact. No odor noted. Skin prep applied to periwound for protection. 1 piece of black sponge applied to wound bed. Clear drape placed as cover dressing. Negative pressure initiated at 100 mm/hg continuous pressure per MD order. Patient remains on his backside with LLE in z-flex boot after assessment and treatment completed.

## 2017-07-05 NOTE — PROGRESS NOTE ADULT - ASSESSMENT
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 He has gradual onset  progressive fatigue over the last couple of weeks. Patient is unable to ambulate because of his leg ulcers. In ER, he was started on vancomycin IV and zosyn.    1. Right inguinal open wound with probable infection with mixed GNR, corynebacterium spp and EN spp.  Right leg AKA  -inguinal wound culture shows mixed GNR, EN spp and corynebacterium spp  -on vancomycin 500 mg IV q12h and zosyn 3.375 gm IV q8h # 18  -tolerating abx well so far; no side effects noted  -vancomycin trough level is therapeutic  -local wound care per surgery  -PICC line  -plan for 6 weeks of IV abx coverage  -monitor temps  -f/u CBC  -supportive care  2. Other issues: CAD s/p stents, PVD, A.Paul not on anticoagulation due to GI bleeding, Hypertension, COPD, SHAYY on nocturnal BIPAP, Chronic diastolic CHF, Hyperlipidemia, PVD, Iron deficiency anemia, Chronic back pain, Gout, BPH, CKD III   -care per medicine

## 2017-07-05 NOTE — PROGRESS NOTE ADULT - ASSESSMENT
81 yo man with long hx of CKD and hypernatremia with very variable parameters.  Post right AKA    --CKD   Renal parameters stable  --Hypernatremia   stable with some variability  --Right AKA   continue  abtx and wound care.

## 2017-07-06 LAB
ANION GAP SERPL CALC-SCNC: 9 MMOL/L — SIGNIFICANT CHANGE UP (ref 5–17)
BUN SERPL-MCNC: 42 MG/DL — HIGH (ref 7–23)
CALCIUM SERPL-MCNC: 8.3 MG/DL — LOW (ref 8.5–10.1)
CHLORIDE SERPL-SCNC: 106 MMOL/L — SIGNIFICANT CHANGE UP (ref 96–108)
CO2 SERPL-SCNC: 32 MMOL/L — HIGH (ref 22–31)
CREAT SERPL-MCNC: 1.44 MG/DL — HIGH (ref 0.5–1.3)
GLUCOSE SERPL-MCNC: 95 MG/DL — SIGNIFICANT CHANGE UP (ref 70–99)
HCT VFR BLD CALC: 27 % — LOW (ref 39–50)
HGB BLD-MCNC: 8.7 G/DL — LOW (ref 13–17)
MCHC RBC-ENTMCNC: 29 PG — SIGNIFICANT CHANGE UP (ref 27–34)
MCHC RBC-ENTMCNC: 32.2 GM/DL — SIGNIFICANT CHANGE UP (ref 32–36)
MCV RBC AUTO: 90 FL — SIGNIFICANT CHANGE UP (ref 80–100)
PLATELET # BLD AUTO: 235 K/UL — SIGNIFICANT CHANGE UP (ref 150–400)
POTASSIUM SERPL-MCNC: 2.9 MMOL/L — CRITICAL LOW (ref 3.5–5.3)
POTASSIUM SERPL-SCNC: 2.9 MMOL/L — CRITICAL LOW (ref 3.5–5.3)
RBC # BLD: 3 M/UL — LOW (ref 4.2–5.8)
RBC # FLD: 17.2 % — HIGH (ref 10.3–14.5)
SODIUM SERPL-SCNC: 147 MMOL/L — HIGH (ref 135–145)
WBC # BLD: 5.3 K/UL — SIGNIFICANT CHANGE UP (ref 3.8–10.5)
WBC # FLD AUTO: 5.3 K/UL — SIGNIFICANT CHANGE UP (ref 3.8–10.5)

## 2017-07-06 RX ORDER — POTASSIUM CHLORIDE 20 MEQ
40 PACKET (EA) ORAL ONCE
Qty: 0 | Refills: 0 | Status: COMPLETED | OUTPATIENT
Start: 2017-07-06 | End: 2017-07-06

## 2017-07-06 RX ORDER — HYDROMORPHONE HYDROCHLORIDE 2 MG/ML
1 INJECTION INTRAMUSCULAR; INTRAVENOUS; SUBCUTANEOUS ONCE
Qty: 0 | Refills: 0 | Status: DISCONTINUED | OUTPATIENT
Start: 2017-07-06 | End: 2017-07-06

## 2017-07-06 RX ORDER — HYDROMORPHONE HYDROCHLORIDE 2 MG/ML
1 INJECTION INTRAMUSCULAR; INTRAVENOUS; SUBCUTANEOUS EVERY 4 HOURS
Qty: 0 | Refills: 0 | Status: DISCONTINUED | OUTPATIENT
Start: 2017-07-06 | End: 2017-07-08

## 2017-07-06 RX ORDER — HYDROMORPHONE HYDROCHLORIDE 2 MG/ML
0.5 INJECTION INTRAMUSCULAR; INTRAVENOUS; SUBCUTANEOUS EVERY 4 HOURS
Qty: 0 | Refills: 0 | Status: DISCONTINUED | OUTPATIENT
Start: 2017-07-06 | End: 2017-07-08

## 2017-07-06 RX ORDER — LOPERAMIDE HCL 2 MG
2 TABLET ORAL EVERY 12 HOURS
Qty: 0 | Refills: 0 | Status: DISCONTINUED | OUTPATIENT
Start: 2017-07-06 | End: 2017-07-08

## 2017-07-06 RX ADMIN — Medication 2 MILLIGRAM(S): at 18:55

## 2017-07-06 RX ADMIN — Medication 325 MILLIGRAM(S): at 08:35

## 2017-07-06 RX ADMIN — ISOSORBIDE MONONITRATE 120 MILLIGRAM(S): 60 TABLET, EXTENDED RELEASE ORAL at 11:09

## 2017-07-06 RX ADMIN — PIPERACILLIN AND TAZOBACTAM 25 GRAM(S): 4; .5 INJECTION, POWDER, LYOPHILIZED, FOR SOLUTION INTRAVENOUS at 06:20

## 2017-07-06 RX ADMIN — Medication 100 MILLIGRAM(S): at 11:09

## 2017-07-06 RX ADMIN — HEPARIN SODIUM 5000 UNIT(S): 5000 INJECTION INTRAVENOUS; SUBCUTANEOUS at 13:31

## 2017-07-06 RX ADMIN — Medication 40 MILLIEQUIVALENT(S): at 22:48

## 2017-07-06 RX ADMIN — HYDROMORPHONE HYDROCHLORIDE 1 MILLIGRAM(S): 2 INJECTION INTRAMUSCULAR; INTRAVENOUS; SUBCUTANEOUS at 01:34

## 2017-07-06 RX ADMIN — Medication 50 MILLIGRAM(S): at 05:14

## 2017-07-06 RX ADMIN — PIPERACILLIN AND TAZOBACTAM 25 GRAM(S): 4; .5 INJECTION, POWDER, LYOPHILIZED, FOR SOLUTION INTRAVENOUS at 14:12

## 2017-07-06 RX ADMIN — PANTOPRAZOLE SODIUM 40 MILLIGRAM(S): 20 TABLET, DELAYED RELEASE ORAL at 06:20

## 2017-07-06 RX ADMIN — Medication 1 APPLICATION(S): at 11:09

## 2017-07-06 RX ADMIN — HEPARIN SODIUM 5000 UNIT(S): 5000 INJECTION INTRAVENOUS; SUBCUTANEOUS at 21:10

## 2017-07-06 RX ADMIN — Medication 25 MILLIGRAM(S): at 17:39

## 2017-07-06 RX ADMIN — Medication 25 MILLIGRAM(S): at 05:14

## 2017-07-06 RX ADMIN — GABAPENTIN 300 MILLIGRAM(S): 400 CAPSULE ORAL at 11:09

## 2017-07-06 RX ADMIN — Medication 100 MILLIGRAM(S): at 18:35

## 2017-07-06 RX ADMIN — Medication 100 MILLIGRAM(S): at 05:15

## 2017-07-06 RX ADMIN — PRAMIPEXOLE DIHYDROCHLORIDE 0.12 MILLIGRAM(S): 0.12 TABLET ORAL at 11:09

## 2017-07-06 RX ADMIN — AMLODIPINE BESYLATE 5 MILLIGRAM(S): 2.5 TABLET ORAL at 05:14

## 2017-07-06 RX ADMIN — Medication 8 MILLIGRAM(S): at 21:09

## 2017-07-06 RX ADMIN — Medication 0.5 MILLIGRAM(S): at 10:29

## 2017-07-06 RX ADMIN — HYDROMORPHONE HYDROCHLORIDE 1 MILLIGRAM(S): 2 INJECTION INTRAMUSCULAR; INTRAVENOUS; SUBCUTANEOUS at 11:30

## 2017-07-06 RX ADMIN — Medication 40 MILLIGRAM(S): at 17:39

## 2017-07-06 RX ADMIN — PIPERACILLIN AND TAZOBACTAM 25 GRAM(S): 4; .5 INJECTION, POWDER, LYOPHILIZED, FOR SOLUTION INTRAVENOUS at 21:10

## 2017-07-06 RX ADMIN — Medication 145 MILLIGRAM(S): at 11:09

## 2017-07-06 RX ADMIN — Medication 40 MILLIGRAM(S): at 05:14

## 2017-07-06 RX ADMIN — Medication 1 TABLET(S): at 11:09

## 2017-07-06 RX ADMIN — Medication 0.5 MILLIGRAM(S): at 19:54

## 2017-07-06 RX ADMIN — Medication 325 MILLIGRAM(S): at 17:39

## 2017-07-06 RX ADMIN — HYDROMORPHONE HYDROCHLORIDE 1 MILLIGRAM(S): 2 INJECTION INTRAMUSCULAR; INTRAVENOUS; SUBCUTANEOUS at 11:06

## 2017-07-06 RX ADMIN — HEPARIN SODIUM 5000 UNIT(S): 5000 INJECTION INTRAVENOUS; SUBCUTANEOUS at 05:14

## 2017-07-06 RX ADMIN — SIMVASTATIN 10 MILLIGRAM(S): 20 TABLET, FILM COATED ORAL at 21:09

## 2017-07-06 RX ADMIN — Medication 81 MILLIGRAM(S): at 11:09

## 2017-07-06 NOTE — PROGRESS NOTE ADULT - PROBLEM SELECTOR PLAN 5
- K at 3.0 today  Potassium, Serum: 3.0 mmol/L (07.04.17 @ 05:34)  Potassium, Serum: 3.6 mmol/L (07.05.17 @ 05:05)    - K repleted  - Mag at 1.7  Magnesium, Serum: 1.7 mg/dL (07.04.17 @ 09:59)  - Mag repleted  - Na @ 148:  Encouraged to increase free water intake  - Serial BMPs. - K at 2.9 - repleted.  -f/u in the morning.     - K repleted  - Mag at 1.7  Magnesium, Serum: 1.7 mg/dL (07.04.17 @ 09:59)  - Mag repleted  - Na @ 148:  Encouraged to increase free water intake  - Serial BMPs.

## 2017-07-06 NOTE — PROGRESS NOTE ADULT - PROBLEM SELECTOR PLAN 1
- Current Hg= 8.9  - Hemodynamically stable  Hemoglobin: 8.9 g/dL (07.04.17 @ 05:34)  Hemoglobin: 8.9 g/dL (07.05.17 @ 05:05) - Current H/h stable  - Hemodynamically stable

## 2017-07-06 NOTE — PROGRESS NOTE ADULT - SUBJECTIVE AND OBJECTIVE BOX
NEPHROLOGY INTERVAL HPI/OVERNIGHT EVENTS:  7/5 SY  Resting fairly comfortably.  No complaints.    81 y/o wm with hx of CKD stage 3 ( scr in low 2's in past)  presents from [facility] with anemia with hgb of 7.7..      During course of hospitalizeation, pt transfused and EGD was held due to his high risk.    Noted with leg ulcer that was wornseing and with rt groin wound debridement with s/p now AKA earlier this week.    PICC line placed for tx of malcolm revealing GNR with ENspp and corynebacteriam.  on Vanc and Zosyn with total of 6 weeks of abx planned.      Now with variable renal function in setting of higher vanc trough with Na values in 148.  Trial of IVF given and started this AM    7/6  no complaints  has diarrhea        MEDICATIONS  (STANDING):  aspirin  chewable 81 milliGRAM(s) Oral daily  heparin  Injectable 5000 Unit(s) SubCutaneous every 8 hours  allopurinol 100 milliGRAM(s) Oral daily  doxazosin 8 milliGRAM(s) Oral at bedtime  fenofibrate Tablet 145 milliGRAM(s) Oral daily  ferrous    sulfate 325 milliGRAM(s) Oral two times a day with meals  gabapentin 300 milliGRAM(s) Oral daily  hydrALAZINE 50 milliGRAM(s) Oral daily  isosorbide   mononitrate ER Tablet (IMDUR) 120 milliGRAM(s) Oral daily  pramipexole 0.125 milliGRAM(s) Oral daily  simvastatin 10 milliGRAM(s) Oral at bedtime  piperacillin/tazobactam IVPB. 3.375 Gram(s) IV Intermittent every 8 hours  ammonium lactate 12% Lotion 1 Application(s) Topical daily  buDESOnide   0.5 milliGRAM(s) Respule 0.5 milliGRAM(s) Inhalation two times a day  metoprolol 25 milliGRAM(s) Oral two times a day  amLODIPine   Tablet 5 milliGRAM(s) Oral daily  pantoprazole    Tablet 40 milliGRAM(s) Oral before breakfast  vancomycin  IVPB 400 milliGRAM(s) IV Intermittent every 12 hours  furosemide    Tablet 40 milliGRAM(s) Oral two times a day  lactobacillus acidophilus 1 Tablet(s) Oral daily    MEDICATIONS  (PRN):  acetaminophen   Tablet. 500 milliGRAM(s) Oral every 6 hours PRN Mild Pain (1 - 3)  ALBUTerol    90 MICROgram(s) HFA Inhaler 2 Puff(s) Inhalation every 6 hours PRN Shortness of Breath  HYDROmorphone   Tablet 1 milliGRAM(s) Oral three times a day PRN Moderate to Severe Pain (4 - 10)          Vital Signs Last 24 Hrs  T(C): 37.2 (06 Jul 2017 16:32), Max: 37.2 (06 Jul 2017 05:11)  T(F): 99 (06 Jul 2017 16:32), Max: 99 (06 Jul 2017 05:11)  HR: 65 (06 Jul 2017 16:32) (65 - 73)  BP: 163/50 (06 Jul 2017 16:32) (153/45 - 163/50)  BP(mean): --  RR: 18 (06 Jul 2017 11:03) (18 - 18)  SpO2: 98% (06 Jul 2017 16:32) (97% - 98%)    I&O's Summary    05 Jul 2017 07:01  -  06 Jul 2017 07:00  --------------------------------------------------------  IN: 0 mL / OUT: 500 mL / NET: -500 mL    06 Jul 2017 07:01  -  06 Jul 2017 19:50  --------------------------------------------------------  IN: 0 mL / OUT: 300 mL / NET: -300 mL          PHYSICAL EXAM:  GENERAL: NAD  CHEST/LUNG:  clear  HEART: RR   ABDOMEN: soft non tender  EXTREMITIES: no edema  SKIN:     LABS:                        8.7    5.3   )-----------( 235      ( 06 Jul 2017 05:22 )             27.0     07-06    147<H>  |  106  |  42<H>  ----------------------------<  95  2.9<LL>   |  32<H>  |  1.44<H>    Ca    8.3<L>      06 Jul 2017 05:22                    RADIOLOGY & ADDITIONAL TESTS:

## 2017-07-06 NOTE — PROGRESS NOTE ADULT - PROBLEM SELECTOR PLAN 2
- s/p R AKA POD #8  - Continue Vanc  - no fever  - wound vac dressing changed on Friday 7/3  - Awaiting placement at [facility] for rehab - s/p R AKA POD #9  - Continue Vanc and zosyn  - no fever  - wound vac dressing changed on Friday 7/3  - Awaiting rehab placement

## 2017-07-06 NOTE — PROGRESS NOTE ADULT - ASSESSMENT
83 yo man with long hx of CKD and hypernatremia with very variable parameters.  Post right AKA    --CKD   Renal parameters stable  --Hypernatremia   stable with some variability  --Right AKA   continue  abtx and wound care.    7/6  stable creat   await dc

## 2017-07-06 NOTE — PROGRESS NOTE ADULT - SUBJECTIVE AND OBJECTIVE BOX
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 He has gradual onset  progressive fatigue over the last couple of weeks. Patient is unable to ambulate because of his leg ulcers. In ER, he was started on vancomycin IV and zosyn.    s/p right leg AKA  Lying in bed in NAD  Denies pain  Tmax 99.1F  Alert  Wound VAC in place    MEDICATIONS  (STANDING):  aspirin  chewable 81 milliGRAM(s) Oral daily  heparin  Injectable 5000 Unit(s) SubCutaneous every 8 hours  allopurinol 100 milliGRAM(s) Oral daily  doxazosin 8 milliGRAM(s) Oral at bedtime  fenofibrate Tablet 145 milliGRAM(s) Oral daily  ferrous    sulfate 325 milliGRAM(s) Oral two times a day with meals  gabapentin 300 milliGRAM(s) Oral daily  hydrALAZINE 50 milliGRAM(s) Oral daily  isosorbide   mononitrate ER Tablet (IMDUR) 120 milliGRAM(s) Oral daily  pramipexole 0.125 milliGRAM(s) Oral daily  simvastatin 10 milliGRAM(s) Oral at bedtime  piperacillin/tazobactam IVPB. 3.375 Gram(s) IV Intermittent every 8 hours  ammonium lactate 12% Lotion 1 Application(s) Topical daily  buDESOnide   0.5 milliGRAM(s) Respule 0.5 milliGRAM(s) Inhalation two times a day  metoprolol 25 milliGRAM(s) Oral two times a day  amLODIPine   Tablet 5 milliGRAM(s) Oral daily  pantoprazole    Tablet 40 milliGRAM(s) Oral before breakfast  vancomycin  IVPB 400 milliGRAM(s) IV Intermittent every 12 hours  furosemide    Tablet 40 milliGRAM(s) Oral two times a day  lactobacillus acidophilus 1 Tablet(s) Oral daily    MEDICATIONS  (PRN):  acetaminophen   Tablet. 500 milliGRAM(s) Oral every 6 hours PRN Mild Pain (1 - 3)  ALBUTerol    90 MICROgram(s) HFA Inhaler 2 Puff(s) Inhalation every 6 hours PRN Shortness of Breath      Vital Signs Last 24 Hrs  T(C): 37.2 (2017 05:11), Max: 37.3 (2017 16:35)  T(F): 99 (2017 05:11), Max: 99.1 (2017 16:35)  HR: 73 (2017 05:11) (66 - 73)  BP: 153/45 (2017 05:11) (153/45 - 172/34)  BP(mean): --  RR: 18 (2017 10:29) (18 - 18)  SpO2: 98% (2017 05:11) (98% - 100%)    Physical Exam:        Constitutional: frail looking  HEENT: NC/AT, EOMI, PERRLA  Neck: supple  Back: no tenderness  Respiratory: decreased BS at bases  Cardiovascular: S1S2 regular, no murmurs  Abdomen: soft, not tender, not distended, positive BS  Genitourinary: right inguinal open wound packed  Rectal: deferred  Musculoskeletal: no muscle tenderness, no joint swelling or tenderness  Extremities: 1+ pedal edema; right inguinal open wound with VAC; right AKA  Neurological: AxOx3, moving all extremities, no focal deficits  Skin: no rashes    Labs:                        8.9    6.1   )-----------( 224      ( 2017 05:05 )             27.6         145  |  110<H>  |  38<H>  ----------------------------<  103<H>  3.6   |  30  |  1.43<H>    Ca    8.2<L>      2017 05:05  Mg     1.7     07-         Vancomycin Level, Trough: 18.5 ug/mL ( @ 05:05)      Cultures:                         9.0    5.8   )-----------( 226      ( 2017 07:14 )             28.2     07-    147<H>  |  113<H>  |  41<H>  ----------------------------<  99  3.8   |  26  |  1.55<H>    Ca    8.2<L>      2017 07:14         Vancomycin Level, Trough: 18.1 ug/mL ( @ 07:14)  Vancomycin Level, Trough: 20.3 ug/mL ( @ 04:39)      Cultures:                         8.0    5.9   )-----------( 227      ( 2017 05:45 )             24.8         148<H>  |  114<H>  |  35<H>  ----------------------------<  121<H>  3.6   |  26  |  1.38<H>    Ca    7.8<L>      2017 05:45         Vancomycin Level, Trough: 20.1 ug/mL ( @ 05:22)      Cultures:                         8.7    8.9   )-----------( 254      ( 2017 06:04 )             27.0     06-28    143  |  x   |  x   ----------------------------<  x   x    |  x   |  x     Ca    7.9<L>      2017 06:04         Vancomycin Level, Trough: 20.1 ug/mL ( @ 05:22)                          8.7    8.9   )-----------( 254      ( 2017 06:04 )             27.0     06-    150<H>  |  115<H>  |  32<H>  ----------------------------<  69<L>  3.8   |  22  |  1.40<H>    Ca    7.9<L>      2017 06:04                          8.6    7.2   )-----------( 284      ( 2017 07:16 )             26.6     06-26    145  |  112<H>  |  30<H>  ----------------------------<  98  3.9   |  27  |  1.25    Ca    7.8<L>      2017 07:16                          10.2   8.0   )-----------( 366      ( 2017 06:22 )             32.7     06-    148<H>  |  115<H>  |  29<H>  ----------------------------<  133<H>  3.8   |  23  |  1.29    Ca    7.7<L>      2017 06:22                          8.3    6.5   )-----------( 367      ( 2017 06:25 )             25.6     06-20    146<H>  |  113<H>  |  32<H>  ----------------------------<  118<H>  3.2<L>   |  27  |  1.33<H>    Ca    7.9<L>      2017 06:25         Vancomycin Level, Trough: 14.2 ug/mL ( @ 05:00)      Cultures:                       8.1    7.0   )-----------( 331      ( 2017 05:00 )             25.2     06-19    151<H>  |  116<H>  |  40<H>  ----------------------------<  97  3.6   |  27  |  1.38<H>    Ca    8.1<L>      2017 05:00         Vancomycin Level, Trough: 14.2 ug/mL ( @ 05:00)                          8.2    8.5   )-----------( 312      ( 2017 03:54 )             25.0     06-17    148<H>  |  114<H>  |  53<H>  ----------------------------<  129<H>  3.8   |  28  |  1.65<H>    Ca    8.2<L>      2017 03:54  Phos  1.9       Mg     2.1         TPro  5.2<L>  /  Alb  1.7<L>  /  TBili  0.3  /  DBili  x   /  AST  20  /  ALT  12  /  AlkPhos  57       LIVER FUNCTIONS - ( 2017 03:54 )  Alb: 1.7 g/dL / Pro: 5.2 gm/dL / ALK PHOS: 57 U/L / ALT: 12 U/L / AST: 20 U/L / GGT: x           Urinalysis Basic - ( 2017 10:36 )    Color: Yellow / Appearance: Clear / S.010 / pH: x  Gluc: x / Ketone: Negative  / Bili: Negative / Urobili: Negative mg/dL   Blood: x / Protein: 15 mg/dL / Nitrite: Negative   Culture - Other (17 @ 12:50)    Specimen Source: .Other right groin wound    Culture Results:   Moderate Mixed gram negative rods  Few Enterococcus species  Rare Corynebacterium species    Leuk Esterase: Trace / RBC: x / WBC 0-2   Sq Epi: x / Non Sq Epi: x / Bacteria: x    Culture - Blood (17 @ 03:54)    Specimen Source: .Blood Blood    Culture Results:   No growth to date.    Culture - Other (17 @ 12:50)    Specimen Source: .Other right groin wound    Culture Results:   Moderate Mixed gram negative rods  Few Enterococcus species          Radiology:    Right foot MRI:  1.  Large skin ulcer at the posterior aspect of the heel.  2.  Fluid tracking from the skin ulcer towards the medial calcaneus   suspicious for small abscess.  3.  Osteomyelitis of the posterior calcaneus.  4.  Osteonecrosis within the posterior calcaneus.  5.  Partial tearing of the lateral Achilles insertion.  6.  Full-thickness tearing of the lateral cord of the plantar fascia with   partial tear of the central cord.    Advanced directives addressed: full resuscitation

## 2017-07-06 NOTE — PROGRESS NOTE ADULT - PROBLEM SELECTOR PLAN 6
- Scrotal ultrasound benign  - Improved scrotal swelling almost back to baseline  - Given 1x Lasix 40mg. - Scrotal ultrasound benign  - Improved scrotal swelling almost back to baseline  - continue Lasix 40mg PO daily.

## 2017-07-06 NOTE — PROGRESS NOTE ADULT - ASSESSMENT
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 He has gradual onset  progressive fatigue over the last couple of weeks. Patient is unable to ambulate because of his leg ulcers. In ER, he was started on vancomycin IV and zosyn.    1. Right inguinal open wound with probable infection with mixed GNR, corynebacterium spp and EN spp.  Right leg AKA  -inguinal wound culture shows mixed GNR, EN spp and corynebacterium spp  -on vancomycin 500 mg IV q12h and zosyn 3.375 gm IV q8h # 19  -tolerating abx well so far; no side effects noted  -vancomycin trough level is therapeutic  -local wound care per surgery  -PICC line  -plan for 6 weeks of IV abx coverage  -monitor temps  -f/u CBC  -supportive care  2. Other issues: CAD s/p stents, PVD, A.Paul not on anticoagulation due to GI bleeding, Hypertension, COPD, SHAYY on nocturnal BIPAP, Chronic diastolic CHF, Hyperlipidemia, PVD, Iron deficiency anemia, Chronic back pain, Gout, BPH, CKD III   -care per medicine

## 2017-07-06 NOTE — PROGRESS NOTE ADULT - PROBLEM SELECTOR PLAN 3
- U/S of L UE reveals thrombosis of superficial veins of left upper extremity  - Warm compresses of affected area. - U/S of L UE reveals thrombosis of superficial veins of left upper extremity  - Warm compresses and elevate the RUE.

## 2017-07-06 NOTE — PROGRESS NOTE ADULT - SUBJECTIVE AND OBJECTIVE BOX
CHIEF COMPLAINT: Sent from [facility] rehab because of low hemoglobin 7.7    SUBJECTIVE: Patient seen and examined with DrsDesmond Shearer, Dragan Fisher, Maik Le, Mariel Bell on the Family Medicine Teaching Service.  Agree with history, physical, labs and plan which were reviewed in detail.      82 year old male with history of CAD s/p stents (LAD, RCA bare metal around 9/16),PVD (RLE stent/ angioplasty and surgical debridment by Dr Siu 5/20 ) A.Fib not on anticoagulation due to GI bleeding, Hypertension, COPD on home O2 2L, SHAYY on nocturnal BIPAP, ex-smoker (smoked 1ppd X 50 years, quit 22 years ago),  Chronic diastolic CHF, Hyperlipidemia, PVD, Iron deficiency anemia, Chronic back pain, Gout, BPH, CKD III with baseline Cr 2. Was ecently admitted to  on  4/17 with anemia of GI bleeding, RLE and RUE DVTs,( received pRBCs and IVC filter discharged to rehab with aspirin) was readmitted to   ER on 5/4/17 for worsening anemia with Hb 6, worsening leg pain from ulcers and worsening renal function Cr 3.99 now presents from [facility] with anemia (7.7 on labs today decreasing from 9.5 over past few weeks). Pt c/o gradual onset  progressive fatigue over the last couple of weeks. Patient is unable to ambulate because of his leg cellulitis and ulcers. He denies any shortness of breath, palpitations / chest pain / light headedness. According to the daughter the attendants at [facility] did notice dark stools for the last couple of days. Patient denies any abdominal pain or change in bowel or urinary habits.  In ED, + guaiac.     7/4/17  Patient is s/p Right AKA POD # 7  for f/u pre-ulcerative lesion of the left heel. Patient seen with daughter and family at bedside.  Patient complains of pain in left arm which is tender to the touch.  States he is having soft bowel movements. Denies any abdominal pain. Denies any constipation or nausea or vomiting. no sweats, no chills, no fever.    7/5/17  Patient seen and examined at bedside with daughter present.  States bowel movements have slowed down in the morning. No abdominal pain, no fever, no sweats or chills.    7/6/17 -       REVIEW OF SYSTEMS:    CONSTITUTIONAL: No weakness, fevers or chills  EYES/ENT: No visual changes;  No vertigo or throat pain   NECK: No pain or stiffness  RESPIRATORY: No cough, wheezing, hemoptysis; No shortness of breath  CARDIOVASCULAR: No chest pain or palpitations  GASTROINTESTINAL: No abdominal or epigastric pain. No nausea, vomiting, or hematemesis; No constipation. No melena or hematochezia.  GENITOURINARY: No dysuria, frequency or hematuria  NEUROLOGICAL: No numbness or weakness  SKIN: No itching, burning, rashes, or lesions   All other review of systems is negative unless indicated above    Vital Signs Last 24 Hrs  T(C): 37.3 (05 Jul 2017 16:35), Max: 37.3 (05 Jul 2017 05:45)  T(F): 99.1 (05 Jul 2017 16:35), Max: 99.2 (05 Jul 2017 05:45)  HR: 66 (05 Jul 2017 16:35) (60 - 69)  BP: 172/34 (05 Jul 2017 16:35) (155/48 - 185/48)  BP(mean): --  RR: 18 (05 Jul 2017 10:29) (18 - 18)  SpO2: 99% (05 Jul 2017 16:35) (99% - 100%)    I&O's Summary    04 Jul 2017 07:01  -  05 Jul 2017 07:00  --------------------------------------------------------  IN: 400 mL / OUT: 650 mL / NET: -250 mL    05 Jul 2017 07:01  -  05 Jul 2017 18:24  --------------------------------------------------------  IN: 0 mL / OUT: 500 mL / NET: -500 mL        CAPILLARY BLOOD GLUCOSE          PHYSICAL EXAM:    Constitutional: NAD, awake and alert, well-developed  HEENT: PERR, EOMI, Normal Hearing, MMM  Neck: Soft and supple, No LAD, No JVD  Respiratory: Breath sounds are clear bilaterally, No wheezing, rales or rhonchi  Cardiovascular: S1 and S2, regular rate and rhythm, no Murmurs, gallops or rubs  Gastrointestinal: Bowel Sounds present, soft, nontender, nondistended, no guarding, no rebound  Extremities: Right above knee amputation stump with no erythema or discharge, wound is healing well  Vascular: 2+ peripheral pulses  Neurological: A/O x 3, no focal deficits  Musculoskeletal: 5/5 strength b/l upper and lower extremities  Skin: No rashes    MEDICATIONS:  MEDICATIONS  (STANDING):  aspirin  chewable 81 milliGRAM(s) Oral daily  heparin  Injectable 5000 Unit(s) SubCutaneous every 8 hours  allopurinol 100 milliGRAM(s) Oral daily  doxazosin 8 milliGRAM(s) Oral at bedtime  fenofibrate Tablet 145 milliGRAM(s) Oral daily  ferrous    sulfate 325 milliGRAM(s) Oral two times a day with meals  gabapentin 300 milliGRAM(s) Oral daily  hydrALAZINE 50 milliGRAM(s) Oral daily  isosorbide   mononitrate ER Tablet (IMDUR) 120 milliGRAM(s) Oral daily  pramipexole 0.125 milliGRAM(s) Oral daily  simvastatin 10 milliGRAM(s) Oral at bedtime  piperacillin/tazobactam IVPB. 3.375 Gram(s) IV Intermittent every 8 hours  ammonium lactate 12% Lotion 1 Application(s) Topical daily  buDESOnide   0.5 milliGRAM(s) Respule 0.5 milliGRAM(s) Inhalation two times a day  metoprolol 25 milliGRAM(s) Oral two times a day  amLODIPine   Tablet 5 milliGRAM(s) Oral daily  pantoprazole    Tablet 40 milliGRAM(s) Oral before breakfast  vancomycin  IVPB 400 milliGRAM(s) IV Intermittent every 12 hours  furosemide    Tablet 40 milliGRAM(s) Oral two times a day  lactobacillus acidophilus 1 Tablet(s) Oral daily      LABS: All Labs Reviewed:                        8.9    6.1   )-----------( 224      ( 05 Jul 2017 05:05 )             27.6     07-05    145  |  110<H>  |  38<H>  ----------------------------<  103<H>  3.6   |  30  |  1.43<H>    Ca    8.2<L>      05 Jul 2017 05:05  Mg     1.7     07-04            Blood Culture:     RADIOLOGY/EKG:    DVT PPX:    ADVANCED DIRECTIVE:    DISPOSITION: CHIEF COMPLAINT: Sent from [facility] rehab because of low hemoglobin 7.7    SUBJECTIVE: Patient seen and examined with DrsDesmond Shearer, Dragan Fisher, Maik Le, Mariel Bell on the Family Medicine Teaching Service.  Agree with history, physical, labs and plan which were reviewed in detail.      82 year old male with history of CAD s/p stents (LAD, RCA bare metal around 9/16),PVD (RLE stent/ angioplasty and surgical debridment by Dr Siu 5/20 ) A.Fib not on anticoagulation due to GI bleeding, Hypertension, COPD on home O2 2L, SHAYY on nocturnal BIPAP, ex-smoker (smoked 1ppd X 50 years, quit 22 years ago),  Chronic diastolic CHF, Hyperlipidemia, PVD, Iron deficiency anemia, Chronic back pain, Gout, BPH, CKD III with baseline Cr 2. Was ecently admitted to  on  4/17 with anemia of GI bleeding, RLE and RUE DVTs,( received pRBCs and IVC filter discharged to rehab with aspirin) was readmitted to   ER on 5/4/17 for worsening anemia with Hb 6, worsening leg pain from ulcers and worsening renal function Cr 3.99 now presents from [facility] with anemia (7.7 on labs today decreasing from 9.5 over past few weeks). Pt c/o gradual onset  progressive fatigue over the last couple of weeks. Patient is unable to ambulate because of his leg cellulitis and ulcers. He denies any shortness of breath, palpitations / chest pain / light headedness. According to the daughter the attendants at [facility] did notice dark stools for the last couple of days. Patient denies any abdominal pain or change in bowel or urinary habits.  In ED, + guaiac.     7/4/17  Patient is s/p Right AKA POD # 7  for f/u pre-ulcerative lesion of the left heel. Patient seen with daughter and family at bedside.  Patient complains of pain in left arm which is tender to the touch.  States he is having soft bowel movements. Denies any abdominal pain. Denies any constipation or nausea or vomiting. no sweats, no chills, no fever.    7/5/17  Patient seen and examined at bedside with daughter present.  States bowel movements have slowed down in the morning. No abdominal pain, no fever, no sweats or chills.    7/6/17 -   Patient seen and examined at bedside. Patient reports that he is still experiencing bouts of nonbloody diarrhea.  Immodium helps form his stools.  No fever, chills, abdominal pain, nausea, vomiting. no other complaints.      REVIEW OF SYSTEMS:    CONSTITUTIONAL: No weakness, fevers or chills  EYES/ENT: No visual changes;  No vertigo or throat pain   NECK: No pain or stiffness  RESPIRATORY: No cough, wheezing, hemoptysis; No shortness of breath  CARDIOVASCULAR: No chest pain or palpitations  GASTROINTESTINAL: No abdominal or epigastric pain. No nausea, vomiting, or hematemesis; No constipation. No melena or hematochezia. +Diarrhea  GENITOURINARY: No dysuria, frequency or hematuria  NEUROLOGICAL: No numbness or weakness  SKIN: No itching, burning, rashes, or lesions   All other review of systems is negative unless indicated above    T(C): 37.2 (07-06-17 @ 16:32), Max: 37.2 (07-06-17 @ 05:11)  T(F): 99 (07-06-17 @ 16:32), Max: 99 (07-06-17 @ 05:11)  HR: 65 (07-06-17 @ 16:32) (65 - 73)  BP: 163/50 (07-06-17 @ 16:32) (153/45 - 163/50)  ABP: --  ABP(mean): --  RR: 18 (07-06-17 @ 11:03) (18 - 18)  SpO2: 98% (07-06-17 @ 16:32) (97% - 98%)  Wt(kg): --    PHYSICAL EXAM:    Constitutional: NAD, awake and alert, well-developed  HEENT: PERR, EOMI, Normal Hearing, MMM  Neck: Soft and supple, No LAD, No JVD  Respiratory: Breath sounds are clear bilaterally, No wheezing, rales or rhonchi  Cardiovascular: S1 and S2, regular rate and rhythm, no Murmurs, gallops or rubs  Gastrointestinal: Bowel Sounds present, soft, nontender, nondistended, no guarding, no rebound  Extremities: Right above knee amputation stump with no erythema or discharge, wound is healing well  Vascular: 2+ peripheral pulses  Neurological: A/O x 3, no focal deficits  Musculoskeletal: 5/5 strength b/l upper and lower extremities  Skin: No rashes    MEDICATIONS:  MEDICATIONS  (STANDING):  aspirin  chewable 81 milliGRAM(s) Oral daily  heparin  Injectable 5000 Unit(s) SubCutaneous every 8 hours  allopurinol 100 milliGRAM(s) Oral daily  doxazosin 8 milliGRAM(s) Oral at bedtime  fenofibrate Tablet 145 milliGRAM(s) Oral daily  ferrous    sulfate 325 milliGRAM(s) Oral two times a day with meals  gabapentin 300 milliGRAM(s) Oral daily  hydrALAZINE 50 milliGRAM(s) Oral daily  isosorbide   mononitrate ER Tablet (IMDUR) 120 milliGRAM(s) Oral daily  pramipexole 0.125 milliGRAM(s) Oral daily  simvastatin 10 milliGRAM(s) Oral at bedtime  piperacillin/tazobactam IVPB. 3.375 Gram(s) IV Intermittent every 8 hours  ammonium lactate 12% Lotion 1 Application(s) Topical daily  buDESOnide   0.5 milliGRAM(s) Respule 0.5 milliGRAM(s) Inhalation two times a day  metoprolol 25 milliGRAM(s) Oral two times a day  amLODIPine   Tablet 5 milliGRAM(s) Oral daily  pantoprazole    Tablet 40 milliGRAM(s) Oral before breakfast  vancomycin  IVPB 400 milliGRAM(s) IV Intermittent every 12 hours  furosemide    Tablet 40 milliGRAM(s) Oral two times a day  lactobacillus acidophilus 1 Tablet(s) Oral daily      LABS: All Labs Reviewed:          T(C): 37.2 (07-06-17 @ 16:32), Max: 37.2 (07-06-17 @ 05:11)  HR: 65 (07-06-17 @ 16:32) (65 - 73)  BP: 163/50 (07-06-17 @ 16:32) (153/45 - 163/50)  RR: 18 (07-06-17 @ 11:03) (18 - 18)  SpO2: 98% (07-06-17 @ 16:32) (97% - 98%)  Wt(kg): --                              8.7    5.3   )-----------( 235      ( 06 Jul 2017 05:22 )             27.0     06 Jul 2017 05:22    147    |  106    |  42     ----------------------------<  95     2.9     |  32     |  1.44     Ca    8.3        06 Jul 2017 05:22      Blood Culture:     RADIOLOGY/EKG:    DVT PPX:    ADVANCED DIRECTIVE:    DISPOSITION:

## 2017-07-06 NOTE — PROGRESS NOTE ADULT - PROBLEM SELECTOR PLAN 4
- Currently on vancomycin and have been experiencing diarrhea since Friday 6/30  - C Diff negative  - No fever - Currently on vancomycin/zosyn and have been experiencing diarrhea since Friday 6/30  - C Diff negative  - No fever, abdominal pain  -continue immodium prn

## 2017-07-07 LAB
ANION GAP SERPL CALC-SCNC: 7 MMOL/L — SIGNIFICANT CHANGE UP (ref 5–17)
BUN SERPL-MCNC: 47 MG/DL — HIGH (ref 7–23)
CALCIUM SERPL-MCNC: 8.1 MG/DL — LOW (ref 8.5–10.1)
CHLORIDE SERPL-SCNC: 106 MMOL/L — SIGNIFICANT CHANGE UP (ref 96–108)
CO2 SERPL-SCNC: 33 MMOL/L — HIGH (ref 22–31)
CREAT SERPL-MCNC: 1.49 MG/DL — HIGH (ref 0.5–1.3)
GLUCOSE SERPL-MCNC: 93 MG/DL — SIGNIFICANT CHANGE UP (ref 70–99)
HCT VFR BLD CALC: 27 % — LOW (ref 39–50)
HGB BLD-MCNC: 8.6 G/DL — LOW (ref 13–17)
MCHC RBC-ENTMCNC: 28.9 PG — SIGNIFICANT CHANGE UP (ref 27–34)
MCHC RBC-ENTMCNC: 31.9 GM/DL — LOW (ref 32–36)
MCV RBC AUTO: 90.6 FL — SIGNIFICANT CHANGE UP (ref 80–100)
PLATELET # BLD AUTO: 220 K/UL — SIGNIFICANT CHANGE UP (ref 150–400)
POTASSIUM SERPL-MCNC: 2.8 MMOL/L — CRITICAL LOW (ref 3.5–5.3)
POTASSIUM SERPL-SCNC: 2.8 MMOL/L — CRITICAL LOW (ref 3.5–5.3)
RBC # BLD: 2.99 M/UL — LOW (ref 4.2–5.8)
RBC # FLD: 17.2 % — HIGH (ref 10.3–14.5)
SODIUM SERPL-SCNC: 146 MMOL/L — HIGH (ref 135–145)
SURGICAL PATHOLOGY FINAL REPORT - CH: SIGNIFICANT CHANGE UP
WBC # BLD: 5.9 K/UL — SIGNIFICANT CHANGE UP (ref 3.8–10.5)
WBC # FLD AUTO: 5.9 K/UL — SIGNIFICANT CHANGE UP (ref 3.8–10.5)

## 2017-07-07 RX ORDER — POTASSIUM CHLORIDE 20 MEQ
40 PACKET (EA) ORAL ONCE
Qty: 0 | Refills: 0 | Status: DISCONTINUED | OUTPATIENT
Start: 2017-07-07 | End: 2017-07-07

## 2017-07-07 RX ORDER — HYDROMORPHONE HYDROCHLORIDE 2 MG/ML
2 INJECTION INTRAMUSCULAR; INTRAVENOUS; SUBCUTANEOUS THREE TIMES A DAY
Qty: 0 | Refills: 0 | Status: DISCONTINUED | OUTPATIENT
Start: 2017-07-07 | End: 2017-07-08

## 2017-07-07 RX ORDER — POTASSIUM CHLORIDE 20 MEQ
40 PACKET (EA) ORAL EVERY 4 HOURS
Qty: 0 | Refills: 0 | Status: COMPLETED | OUTPATIENT
Start: 2017-07-07 | End: 2017-07-07

## 2017-07-07 RX ORDER — FUROSEMIDE 40 MG
40 TABLET ORAL DAILY
Qty: 0 | Refills: 0 | Status: DISCONTINUED | OUTPATIENT
Start: 2017-07-07 | End: 2017-07-08

## 2017-07-07 RX ADMIN — PIPERACILLIN AND TAZOBACTAM 25 GRAM(S): 4; .5 INJECTION, POWDER, LYOPHILIZED, FOR SOLUTION INTRAVENOUS at 21:42

## 2017-07-07 RX ADMIN — Medication 0.5 MILLIGRAM(S): at 20:21

## 2017-07-07 RX ADMIN — Medication 100 MILLIGRAM(S): at 11:24

## 2017-07-07 RX ADMIN — Medication 81 MILLIGRAM(S): at 11:24

## 2017-07-07 RX ADMIN — Medication 500 MILLIGRAM(S): at 09:40

## 2017-07-07 RX ADMIN — Medication 100 MILLIGRAM(S): at 05:34

## 2017-07-07 RX ADMIN — Medication 325 MILLIGRAM(S): at 09:07

## 2017-07-07 RX ADMIN — Medication 40 MILLIEQUIVALENT(S): at 09:07

## 2017-07-07 RX ADMIN — Medication 325 MILLIGRAM(S): at 17:21

## 2017-07-07 RX ADMIN — Medication 2 MILLIGRAM(S): at 06:28

## 2017-07-07 RX ADMIN — Medication 25 MILLIGRAM(S): at 05:34

## 2017-07-07 RX ADMIN — HEPARIN SODIUM 5000 UNIT(S): 5000 INJECTION INTRAVENOUS; SUBCUTANEOUS at 21:41

## 2017-07-07 RX ADMIN — HEPARIN SODIUM 5000 UNIT(S): 5000 INJECTION INTRAVENOUS; SUBCUTANEOUS at 14:36

## 2017-07-07 RX ADMIN — Medication 40 MILLIEQUIVALENT(S): at 14:36

## 2017-07-07 RX ADMIN — HEPARIN SODIUM 5000 UNIT(S): 5000 INJECTION INTRAVENOUS; SUBCUTANEOUS at 05:34

## 2017-07-07 RX ADMIN — Medication 2 MILLIGRAM(S): at 18:17

## 2017-07-07 RX ADMIN — Medication 40 MILLIGRAM(S): at 05:34

## 2017-07-07 RX ADMIN — Medication 500 MILLIGRAM(S): at 09:06

## 2017-07-07 RX ADMIN — Medication 1 TABLET(S): at 11:24

## 2017-07-07 RX ADMIN — Medication 50 MILLIGRAM(S): at 05:34

## 2017-07-07 RX ADMIN — PANTOPRAZOLE SODIUM 40 MILLIGRAM(S): 20 TABLET, DELAYED RELEASE ORAL at 06:11

## 2017-07-07 RX ADMIN — SIMVASTATIN 10 MILLIGRAM(S): 20 TABLET, FILM COATED ORAL at 21:42

## 2017-07-07 RX ADMIN — Medication 1 APPLICATION(S): at 11:24

## 2017-07-07 RX ADMIN — Medication 40 MILLIEQUIVALENT(S): at 06:51

## 2017-07-07 RX ADMIN — Medication 145 MILLIGRAM(S): at 11:24

## 2017-07-07 RX ADMIN — AMLODIPINE BESYLATE 5 MILLIGRAM(S): 2.5 TABLET ORAL at 05:34

## 2017-07-07 RX ADMIN — Medication 100 MILLIGRAM(S): at 17:21

## 2017-07-07 RX ADMIN — Medication 8 MILLIGRAM(S): at 21:41

## 2017-07-07 RX ADMIN — PRAMIPEXOLE DIHYDROCHLORIDE 0.12 MILLIGRAM(S): 0.12 TABLET ORAL at 11:23

## 2017-07-07 RX ADMIN — Medication 25 MILLIGRAM(S): at 17:21

## 2017-07-07 RX ADMIN — GABAPENTIN 300 MILLIGRAM(S): 400 CAPSULE ORAL at 11:24

## 2017-07-07 RX ADMIN — PIPERACILLIN AND TAZOBACTAM 25 GRAM(S): 4; .5 INJECTION, POWDER, LYOPHILIZED, FOR SOLUTION INTRAVENOUS at 14:37

## 2017-07-07 RX ADMIN — ISOSORBIDE MONONITRATE 120 MILLIGRAM(S): 60 TABLET, EXTENDED RELEASE ORAL at 11:23

## 2017-07-07 RX ADMIN — PIPERACILLIN AND TAZOBACTAM 25 GRAM(S): 4; .5 INJECTION, POWDER, LYOPHILIZED, FOR SOLUTION INTRAVENOUS at 06:27

## 2017-07-07 NOTE — PROGRESS NOTE ADULT - SUBJECTIVE AND OBJECTIVE BOX
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 Patient is resting fairly comfortably in bed, NAD, and AAOx3. Patient denies any left foot/ankle pain. Pt denies N/V/F/C/SOB/CP/headaches. Patient complaints of having diarrhea.     MEDICATIONS  (STANDING):  aspirin  chewable 81 milliGRAM(s) Oral daily  heparin  Injectable 5000 Unit(s) SubCutaneous every 8 hours  allopurinol 100 milliGRAM(s) Oral daily  doxazosin 8 milliGRAM(s) Oral at bedtime  fenofibrate Tablet 145 milliGRAM(s) Oral daily  ferrous    sulfate 325 milliGRAM(s) Oral two times a day with meals  gabapentin 300 milliGRAM(s) Oral daily  hydrALAZINE 50 milliGRAM(s) Oral daily  isosorbide   mononitrate ER Tablet (IMDUR) 120 milliGRAM(s) Oral daily  pramipexole 0.125 milliGRAM(s) Oral daily  simvastatin 10 milliGRAM(s) Oral at bedtime  piperacillin/tazobactam IVPB. 3.375 Gram(s) IV Intermittent every 8 hours  ammonium lactate 12% Lotion 1 Application(s) Topical daily  buDESOnide   0.5 milliGRAM(s) Respule 0.5 milliGRAM(s) Inhalation two times a day  metoprolol 25 milliGRAM(s) Oral two times a day  amLODIPine   Tablet 5 milliGRAM(s) Oral daily  pantoprazole    Tablet 40 milliGRAM(s) Oral before breakfast  vancomycin  IVPB 400 milliGRAM(s) IV Intermittent every 12 hours  furosemide    Tablet 40 milliGRAM(s) Oral two times a day  lactobacillus acidophilus 1 Tablet(s) Oral daily  potassium chloride    Tablet ER 40 milliEquivalent(s) Oral every 4 hours    MEDICATIONS  (PRN):  acetaminophen   Tablet. 500 milliGRAM(s) Oral every 6 hours PRN Mild Pain (1 - 3)  ALBUTerol    90 MICROgram(s) HFA Inhaler 2 Puff(s) Inhalation every 6 hours PRN Shortness of Breath  loperamide 2 milliGRAM(s) Oral every 12 hours PRN Diarrhea  HYDROmorphone  Injectable 0.5 milliGRAM(s) IV Push every 4 hours PRN Moderate Pain (4 - 6)  HYDROmorphone  Injectable 1 milliGRAM(s) IV Push every 4 hours PRN Severe Pain (7 - 10)      Allergies: NKFDA    Vital Signs Last 24 Hrs  T(C): 37.2 (07 Jul 2017 05:20), Max: 37.2 (06 Jul 2017 11:03)  T(F): 98.9 (07 Jul 2017 05:20), Max: 99 (06 Jul 2017 11:03)  HR: 67 (07 Jul 2017 05:20) (65 - 69)  BP: 183/39 (07 Jul 2017 05:20) (153/47 - 183/39)  BP(mean): --  RR: 18 (06 Jul 2017 11:03) (18 - 18)  SpO2: 99% (07 Jul 2017 05:20) (97% - 99%)    PHYSICAL EXAM:  Constitutional: NAD, AAO x3  Extremities: Right LE AKA  Vascular: Left foot: palpable pulse DP and PT 1/4, CFT <4 sec x5, foot is warm to touch, no edema, no varicosity, pedal hair absent     Neuro: Protective sensation is decreased. Light touch sensation: intact  Derm: Preulcerative lesion noted on the postero left heel with black discoloration; firm not boggy. No fluctuance, no open lesion, no active drainage, no mal-odor, no erythema, no edema, no acute clinical signs of infection. No interdigital maceration, toe nails 1-5 are well trimmed; yellow discoloration x5  MSK: No pain upon palpation of the left lower leg and foot. Compartment of the L leg and foot are non tender, and soft.     Labs: All labs reviewed:                                   8.6    5.9   )-----------( 220      ( 07 Jul 2017 05:32 )             27.0     07 Jul 2017 05:32    146    |  106    |  47     ----------------------------<  93     2.8     |  33     |  1.49     Ca    8.1        07 Jul 2017 05:32      Blood Culture:   Culture - Blood (06.17.17 @ 03:54)    Specimen Source: .Blood Blood    Culture Results:   No growth at 5 days.    Clostridium difficile Toxin by PCR (07.02.17 @ 13:45)    C Diff by PCR Result: Not detected      RADIOLOGY:    EXAM:  US DPLX UPR EXT VEINS LTD LT                            PROCEDURE DATE:  07/02/2017        INTERPRETATION:      Ultrasound of the upper extremity deep venous system         CLINICAL INFORMATION: Pain and swelling, deep venous thrombosis.    FINDINGS:    No previous examinations are available for review.    The LEFT upper extremity deep venous system demonstrated focal   nonocclusive thrombus in the medial cubital vein.  The remaining veins   were patent with intact Doppler flow.  The flow varied with respiration   and augmented with distal arm compression.  The arm veins were directly   compressible by the ultrasound transducer.          The veins evaluated included the internal jugular vein, subclavian vein,    axillary vein, the brachial vein, the basilic vein and the cephalic vein.    A catheter is seen with in the LEFT subclavian, axillary, brachial and   basilic veins.      IMPRESSION: Focal nonocclusive thrombus within the median cubital vein.

## 2017-07-07 NOTE — PROGRESS NOTE ADULT - SUBJECTIVE AND OBJECTIVE BOX
Patient is a 82y old  Male who presents with a chief complaint of Sent from [facility] because of low hemoglobin 7.7 (2017 00:37)    HPI:  81 y/o male with h/o CAD s/p stents (LAD, RCA bare metal around ), PVD (RLE stent/ angioplasty) complicated with poorly healing right inguinal wound s/p prior surgical debridment (by Dr Siu  ) A.Fib not on anticoagulation due to GI bleeding, Hypertension, COPD on home O2 2L, SHAYY on nocturnal BIPAP, Chronic diastolic CHF, Hyperlipidemia, PVD, Iron deficiency anemia, Chronic back pain, Gout, BPH, CKD III with baseline Cr 2, 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 He has gradual onset  progressive fatigue over the last couple of weeks. Patient is unable to ambulate because of his leg ulcers. In ER, he was started on vancomycin IV and zosyn.    s/p right leg AKA  Lying in bed in NAD  Denies pain  The patient reports loose stools; but RN reports only small amount of "smear" this AM  Alert  Wound VAC in place    MEDICATIONS  (STANDING):  aspirin  chewable 81 milliGRAM(s) Oral daily  heparin  Injectable 5000 Unit(s) SubCutaneous every 8 hours  allopurinol 100 milliGRAM(s) Oral daily  doxazosin 8 milliGRAM(s) Oral at bedtime  fenofibrate Tablet 145 milliGRAM(s) Oral daily  ferrous    sulfate 325 milliGRAM(s) Oral two times a day with meals  gabapentin 300 milliGRAM(s) Oral daily  hydrALAZINE 50 milliGRAM(s) Oral daily  isosorbide   mononitrate ER Tablet (IMDUR) 120 milliGRAM(s) Oral daily  pramipexole 0.125 milliGRAM(s) Oral daily  simvastatin 10 milliGRAM(s) Oral at bedtime  piperacillin/tazobactam IVPB. 3.375 Gram(s) IV Intermittent every 8 hours  ammonium lactate 12% Lotion 1 Application(s) Topical daily  buDESOnide   0.5 milliGRAM(s) Respule 0.5 milliGRAM(s) Inhalation two times a day  metoprolol 25 milliGRAM(s) Oral two times a day  amLODIPine   Tablet 5 milliGRAM(s) Oral daily  pantoprazole    Tablet 40 milliGRAM(s) Oral before breakfast  vancomycin  IVPB 400 milliGRAM(s) IV Intermittent every 12 hours  furosemide    Tablet 40 milliGRAM(s) Oral two times a day  lactobacillus acidophilus 1 Tablet(s) Oral daily    MEDICATIONS  (PRN):  acetaminophen   Tablet. 500 milliGRAM(s) Oral every 6 hours PRN Mild Pain (1 - 3)  ALBUTerol    90 MICROgram(s) HFA Inhaler 2 Puff(s) Inhalation every 6 hours PRN Shortness of Breath      Vital Signs Last 24 Hrs  T(C): 37.2 (2017 05:11), Max: 37.3 (2017 16:35)  T(F): 99 (2017 05:11), Max: 99.1 (2017 16:35)  HR: 73 (2017 05:11) (66 - 73)  BP: 153/45 (2017 05:11) (153/45 - 172/34)  BP(mean): --  RR: 18 (2017 10:29) (18 - 18)  SpO2: 98% (2017 05:11) (98% - 100%)    Physical Exam:        Constitutional: frail looking  HEENT: NC/AT, EOMI, PERRLA  Neck: supple  Back: no tenderness  Respiratory: decreased BS at bases  Cardiovascular: S1S2 regular, no murmurs  Abdomen: soft, not tender, not distended, positive BS  Genitourinary: right inguinal open wound packed  Rectal: deferred  Musculoskeletal: no muscle tenderness, no joint swelling or tenderness  Extremities: 1+ pedal edema; right inguinal open wound with VAC; right AKA  Neurological: AxOx3, moving all extremities, no focal deficits  Skin: no rashes    Labs:                        8.6    5.9   )-----------( 220      ( 2017 05:32 )             27.0     07-07    146<H>  |  106  |  47<H>  ----------------------------<  93  2.8<LL>   |  33<H>  |  1.49<H>    Ca    8.1<L>      2017 05:32                          8.9    6.1   )-----------( 224      ( 2017 05:05 )             27.6     07    145  |  110<H>  |  38<H>  ----------------------------<  103<H>  3.6   |  30  |  1.43<H>    Ca    8.2<L>      2017 05:05  Mg     1.7     0704         Vancomycin Level, Trough: 18.5 ug/mL ( @ 05:05)      Cultures:                         9.0    5.8   )-----------( 226      ( 2017 07:14 )             28.2     07    147<H>  |  113<H>  |  41<H>  ----------------------------<  99  3.8   |  26  |  1.55<H>    Ca    8.2<L>      2017 07:14         Vancomycin Level, Trough: 18.1 ug/mL ( @ 07:14)  Vancomycin Level, Trough: 20.3 ug/mL ( @ 04:39)      Cultures:                         8.0    5.9   )-----------( 227      ( 2017 05:45 )             24.8     06    148<H>  |  114<H>  |  35<H>  ----------------------------<  121<H>  3.6   |  26  |  1.38<H>    Ca    7.8<L>      2017 05:45         Vancomycin Level, Trough: 20.1 ug/mL ( @ 05:22)      Cultures:                         8.7    8.9   )-----------( 254      ( 2017 06:04 )             27.0         143  |  x   |  x   ----------------------------<  x   x    |  x   |  x     Ca    7.9<L>      2017 06:04         Vancomycin Level, Trough: 20.1 ug/mL ( @ 05:22)                          8.7    8.9   )-----------( 254      ( 2017 06:04 )             27.0     06-28    150<H>  |  115<H>  |  32<H>  ----------------------------<  69<L>  3.8   |  22  |  1.40<H>    Ca    7.9<L>      2017 06:04                          8.6    7.2   )-----------( 284      ( 2017 07:16 )             26.6     06-26    145  |  112<H>  |  30<H>  ----------------------------<  98  3.9   |  27  |  1.25    Ca    7.8<L>      2017 07:16                          10.2   8.0   )-----------( 366      ( 2017 06:22 )             32.7     06-21    148<H>  |  115<H>  |  29<H>  ----------------------------<  133<H>  3.8   |  23  |  1.29    Ca    7.7<L>      2017 06:22                          8.3    6.5   )-----------( 367      ( 2017 06:25 )             25.6     06-20    146<H>  |  113<H>  |  32<H>  ----------------------------<  118<H>  3.2<L>   |  27  |  1.33<H>    Ca    7.9<L>      2017 06:25         Vancomycin Level, Trough: 14.2 ug/mL ( @ 05:00)      Cultures:                       8.1    7.0   )-----------( 331      ( 2017 05:00 )             25.2     06-19    151<H>  |  116<H>  |  40<H>  ----------------------------<  97  3.6   |  27  |  1.38<H>    Ca    8.1<L>      2017 05:00         Vancomycin Level, Trough: 14.2 ug/mL ( @ 05:00)                          8.2    8.5   )-----------( 312      ( 2017 03:54 )             25.0         148<H>  |  114<H>  |  53<H>  ----------------------------<  129<H>  3.8   |  28  |  1.65<H>    Ca    8.2<L>      2017 03:54  Phos  1.9       Mg     2.1         TPro  5.2<L>  /  Alb  1.7<L>  /  TBili  0.3  /  DBili  x   /  AST  20  /  ALT  12  /  AlkPhos  57       LIVER FUNCTIONS - ( 2017 03:54 )  Alb: 1.7 g/dL / Pro: 5.2 gm/dL / ALK PHOS: 57 U/L / ALT: 12 U/L / AST: 20 U/L / GGT: x           Urinalysis Basic - ( 2017 10:36 )    Color: Yellow / Appearance: Clear / S.010 / pH: x  Gluc: x / Ketone: Negative  / Bili: Negative / Urobili: Negative mg/dL   Blood: x / Protein: 15 mg/dL / Nitrite: Negative   Culture - Other (17 @ 12:50)    Specimen Source: .Other right groin wound    Culture Results:   Moderate Mixed gram negative rods  Few Enterococcus species  Rare Corynebacterium species    Leuk Esterase: Trace / RBC: x / WBC 0-2   Sq Epi: x / Non Sq Epi: x / Bacteria: x    Culture - Blood (17 @ 03:54)    Specimen Source: .Blood Blood    Culture Results:   No growth to date.    Culture - Other (17 @ 12:50)    Specimen Source: .Other right groin wound    Culture Results:   Moderate Mixed gram negative rods  Few Enterococcus species    Clostridium difficile Toxin by PCR (17 @ 13:45)    C Diff by PCR Result: NotDetec        Radiology:    Right foot MRI:  1.  Large skin ulcer at the posterior aspect of the heel.  2.  Fluid tracking from the skin ulcer towards the medial calcaneus   suspicious for small abscess.  3.  Osteomyelitis of the posterior calcaneus.  4.  Osteonecrosis within the posterior calcaneus.  5.  Partial tearing of the lateral Achilles insertion.  6.  Full-thickness tearing of the lateral cord of the plantar fascia with   partial tear of the central cord.    Advanced directives addressed: full resuscitation

## 2017-07-07 NOTE — PROGRESS NOTE ADULT - PROBLEM SELECTOR PLAN 4
- Currently on vancomycin/zosyn and have been experiencing diarrhea since Friday 6/30  - C Diff negative  - No fever, abdominal pain  -continue immodium prn

## 2017-07-07 NOTE — PROGRESS NOTE ADULT - PROBLEM SELECTOR PLAN 2
s/p R AKA POD #11  - no fever  - Appreciate infectious disease consult, Dr. Burleson:        - wound vac dressing on inguinal open wound changed on 7/5       - inguinal wound shows mixed GNR, EN spp and corynebacterium spp       - Continue vancomycin 500 mg IV q12h and zosyn 3.375 gm IV q8h day 22 for        6wks  - OOB in chair today

## 2017-07-07 NOTE — ADVANCED PRACTICE NURSE CONSULT - RECOMMEDATIONS
1) Turn and position every 2 hours  2) Elevate left heel and right stump off mattress   3) Wound VAC dressing to be changed on SUNDAY 7/2/2017  4) Albumin 1.7. Nutritionist following patient.
Wound vac dressing to be changed on Friday, 7/7/2017
Wound vac dressing to be changed on Monday, 7/10/2017
Wound vac to be changed on 4/5/2017

## 2017-07-07 NOTE — PROGRESS NOTE ADULT - PROBLEM SELECTOR PROBLEM 3
Acute thrombosis of superficial veins of left upper extremity
Acute thrombosis of superficial veins of left upper extremity
Hypertension
Acute thrombosis of superficial veins of left upper extremity
CAD (coronary artery disease)
CAD (coronary artery disease)
Acute thrombosis of superficial veins of left upper extremity
Hypertension
Acute thrombosis of superficial veins of left upper extremity

## 2017-07-07 NOTE — PROGRESS NOTE ADULT - PROBLEM SELECTOR PLAN 5
- Critical value of K=2.8 communicated; K PO 40 x2 given  - Serial BMPs.  - Appreciate Nephro consult, Dr. Zamora: - pt at baseline Na level. d/c IVF; start lasix 40 qd   - strict i/o.

## 2017-07-07 NOTE — PROGRESS NOTE ADULT - PROBLEM SELECTOR PROBLEM 1
Anemia

## 2017-07-07 NOTE — PROGRESS NOTE ADULT - PROBLEM SELECTOR PROBLEM 2
Leg ulcer
GI bleeding
GI bleeding
Leg ulcer

## 2017-07-07 NOTE — ADVANCED PRACTICE NURSE CONSULT - ASSESSMENT
This is an 82 year old male that was admitted to the hospital on 6/16/2017 for GI bleed and anemia. 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    In to change wound vac dressing to right inguinal wound. Patient presents on a Synergy air elite low airloss mattress on his left side. LLE in z-flex boot.     Old dressing removed to right groin. Wound irrigated with normal saline. Wound bed with 100% pink granulation tissue.  Periwound intact. No odor noted. Skin prep applied to periwound for protection. 1 piece of black sponge applied to wound bed. Clear drape placed as cover dressing. Negative pressure initiated at 100 mm/hg continuous pressure per MD order. Patient remains on his left with LLE in z-flex boot after assessment and treatment completed.

## 2017-07-07 NOTE — PROGRESS NOTE ADULT - PROBLEM SELECTOR PROBLEM 9
COPD (chronic obstructive pulmonary disease)
Gout
Gout
COPD (chronic obstructive pulmonary disease)
COPD (chronic obstructive pulmonary disease)

## 2017-07-07 NOTE — PROGRESS NOTE ADULT - PROBLEM SELECTOR PLAN 2
- s/p R AKA POD #10  - Continue Vanc and zosyn  - no fever  - wound vac dressing changed on Friday 7/3  - Awaiting rehab placement

## 2017-07-07 NOTE — PROGRESS NOTE ADULT - PROBLEM SELECTOR PLAN 9
- Former smoker  - 3L O2 NC  - Continue Albuterol 90 mcg  inhaler q6hrs PRN for SOB
continue allopurinol
continue allopurinol
- Former smoker  - 3L O2 NC  - Continue Albuterol 90 mcg  inhaler q6hrs PRN for SOB

## 2017-07-07 NOTE — PROGRESS NOTE ADULT - ASSESSMENT
82 year old male patient s/p AKBHUPENDRA RLE (6/27/17) is seen and evaluated for:    1. Pre-ulcerative lesion Left plantar heel; No clinical signs of infection noted  2. Other issues PVD; s/p  AKA    P:  Pt was seen and examined. Plan was discussed with attending Dr. Fermin.  Cavillon applied to preulcerative lesion on left heel and Z-flex boot reapplied.  Patient to wear boot at all times while in bed  Cont. IV abx per ID, appreciated.  Care per Medicine and Vascular surgery appreciated.  Pt stable for discharge from Podiatry standpoint.  Podiatry to follow while in house.

## 2017-07-07 NOTE — PROGRESS NOTE ADULT - PROBLEM SELECTOR PLAN 4
- Currently on vancomycin and have been experiencing diarrhea since Friday 6/30  - C Diff negative  - No fever; no other signs of infection, could possibly be 2/2 lactose intolerance or other gut microbiota disturbance  - 2nd dose of loperamide 2mg  - start lactobacillus acidophilus.  - Call Dr. Hector

## 2017-07-07 NOTE — PROGRESS NOTE ADULT - SUBJECTIVE AND OBJECTIVE BOX
CHIEF COMPLAINT: Sent from [facility] rehab because of low hemoglobin 7.7    SUBJECTIVE: Patient seen and examined with DrsDesmond Shearer, Dragan Fisher, Maik Le, Mariel Bell on the Family Medicine Teaching Service.  Agree with history, physical, labs and plan which were reviewed in detail.      82 year old male with history of CAD s/p stents (LAD, RCA bare metal around 9/16),PVD (RLE stent/ angioplasty and surgical debridment by Dr Siu 5/20 ) A.Fib not on anticoagulation due to GI bleeding, Hypertension, COPD on home O2 2L, SHAYY on nocturnal BIPAP, ex-smoker (smoked 1ppd X 50 years, quit 22 years ago),  Chronic diastolic CHF, Hyperlipidemia, PVD, Iron deficiency anemia, Chronic back pain, Gout, BPH, CKD III with baseline Cr 2. Was ecently admitted to  on  4/17 with anemia of GI bleeding, RLE and RUE DVTs,( received pRBCs and IVC filter discharged to rehab with aspirin) was readmitted to   ER on 5/4/17 for worsening anemia with Hb 6, worsening leg pain from ulcers and worsening renal function Cr 3.99 now presents from [facility] with anemia (7.7 on labs today decreasing from 9.5 over past few weeks). Pt c/o gradual onset  progressive fatigue over the last couple of weeks. Patient is unable to ambulate because of his leg cellulitis and ulcers. He denies any shortness of breath, palpitations / chest pain / light headedness. According to the daughter the attendants at [facility] did notice dark stools for the last couple of days. Patient denies any abdominal pain or change in bowel or urinary habits.  In ED, + guaiac.       7/7: Pt seen and evaluated at bedside.  Diarrhea still persists although BM frequency decreased to 5 over night. Denies  abdominal pain, nausea, vomiting, or fevers. Hemodynamically stable. Daughter at bed side, all questions answered.      MEDICATIONS  (STANDING):  aspirin  chewable 81 milliGRAM(s) Oral daily  heparin  Injectable 5000 Unit(s) SubCutaneous every 8 hours  allopurinol 100 milliGRAM(s) Oral daily  doxazosin 8 milliGRAM(s) Oral at bedtime  fenofibrate Tablet 145 milliGRAM(s) Oral daily  ferrous    sulfate 325 milliGRAM(s) Oral two times a day with meals  gabapentin 300 milliGRAM(s) Oral daily  hydrALAZINE 50 milliGRAM(s) Oral daily  isosorbide   mononitrate ER Tablet (IMDUR) 120 milliGRAM(s) Oral daily  pramipexole 0.125 milliGRAM(s) Oral daily  simvastatin 10 milliGRAM(s) Oral at bedtime  piperacillin/tazobactam IVPB. 3.375 Gram(s) IV Intermittent every 8 hours  ammonium lactate 12% Lotion 1 Application(s) Topical daily  buDESOnide   0.5 milliGRAM(s) Respule 0.5 milliGRAM(s) Inhalation two times a day  metoprolol 25 milliGRAM(s) Oral two times a day  amLODIPine   Tablet 5 milliGRAM(s) Oral daily  pantoprazole    Tablet 40 milliGRAM(s) Oral before breakfast  vancomycin  IVPB 400 milliGRAM(s) IV Intermittent every 12 hours  lactobacillus acidophilus 1 Tablet(s) Oral daily  furosemide    Tablet 40 milliGRAM(s) Oral daily    MEDICATIONS  (PRN):  acetaminophen   Tablet. 500 milliGRAM(s) Oral every 6 hours PRN Mild Pain (1 - 3)  ALBUTerol    90 MICROgram(s) HFA Inhaler 2 Puff(s) Inhalation every 6 hours PRN Shortness of Breath  loperamide 2 milliGRAM(s) Oral every 12 hours PRN Diarrhea  HYDROmorphone  Injectable 0.5 milliGRAM(s) IV Push every 4 hours PRN Moderate Pain (4 - 6)  HYDROmorphone  Injectable 1 milliGRAM(s) IV Push every 4 hours PRN Severe Pain (7 - 10)  HYDROmorphone   Tablet 2 milliGRAM(s) Oral three times a day PRN Moderate Pain (4 - 6)    ICU Vital Signs Last 24 Hrs  T(C): 36.9 (07 Jul 2017 16:05), Max: 37.2 (07 Jul 2017 05:20)  T(F): 98.4 (07 Jul 2017 16:05), Max: 98.9 (07 Jul 2017 05:20)  HR: 69 (07 Jul 2017 16:05) (65 - 69)  BP: 153/39 (07 Jul 2017 16:05) (153/39 - 183/39)  BP(mean): --  ABP: --  ABP(mean): --  RR: 16 (07 Jul 2017 16:05) (16 - 16)  SpO2: 97% (07 Jul 2017 16:05) (96% - 99%)    PHYSICAL EXAM:  GEN: NAD, comfortable  CV:  S1S2, RRR, No murmur  RESP:  CTABL, good air movement, no rales, rhonchi, or wheezing  ABD: Soft, NT, ND, +BS, no guarding, no rigidity  EXT:  +2 radial and pedal pulse on LLE, no edema on right thigh either; AKA wound site clean, dry and intact

## 2017-07-07 NOTE — PROGRESS NOTE ADULT - SUBJECTIVE AND OBJECTIVE BOX
CHIEF COMPLAINT: Sent from [facility] rehab because of low hemoglobin 7.7    SUBJECTIVE: Patient seen and examined with DrsDesmond Shearer, Dragan Fisher, Maik Le, Mraiel Bell on the Family Medicine Teaching Service.  Agree with history, physical, labs and plan which were reviewed in detail.      82 year old male with history of CAD s/p stents (LAD, RCA bare metal around 9/16),PVD (RLE stent/ angioplasty and surgical debridment by Dr Siu 5/20 ) A.Fib not on anticoagulation due to GI bleeding, Hypertension, COPD on home O2 2L, SHAYY on nocturnal BIPAP, ex-smoker (smoked 1ppd X 50 years, quit 22 years ago),  Chronic diastolic CHF, Hyperlipidemia, PVD, Iron deficiency anemia, Chronic back pain, Gout, BPH, CKD III with baseline Cr 2. Was ecently admitted to  on  4/17 with anemia of GI bleeding, RLE and RUE DVTs,( received pRBCs and IVC filter discharged to rehab with aspirin) was readmitted to   ER on 5/4/17 for worsening anemia with Hb 6, worsening leg pain from ulcers and worsening renal function Cr 3.99 now presents from [facility] with anemia (7.7 on labs today decreasing from 9.5 over past few weeks). Pt c/o gradual onset  progressive fatigue over the last couple of weeks. Patient is unable to ambulate because of his leg cellulitis and ulcers. He denies any shortness of breath, palpitations / chest pain / light headedness. According to the daughter the attendants at [facility] did notice dark stools for the last couple of days. Patient denies any abdominal pain or change in bowel or urinary habits.  In ED, + guaiac.     7/4/17  Patient is s/p Right AKA POD # 7  for f/u pre-ulcerative lesion of the left heel. Patient seen with daughter and family at bedside.  Patient complains of pain in left arm which is tender to the touch.  States he is having soft bowel movements. Denies any abdominal pain. Denies any constipation or nausea or vomiting. no sweats, no chills, no fever.    7/5/17  Patient seen and examined at bedside with daughter present.  States bowel movements have slowed down in the morning. No abdominal pain, no fever, no sweats or chills.    7/6/17 -   Patient seen and examined at bedside. Patient reports that he is still experiencing bouts of nonbloody diarrhea.  Immodium helps form his stools.  No fever, chills, abdominal pain, nausea, vomiting. no other complaints.      7/7: seen and eval. hemodynamically stable. Still with diarrhea. denies any HA, CP, SOB. no abdominal pain, nausea ro vomiting     REVIEW OF SYSTEMS:    CONSTITUTIONAL: No weakness, fevers or chills  EYES/ENT: No visual changes;  No vertigo or throat pain   NECK: No pain or stiffness  RESPIRATORY: No cough, wheezing, hemoptysis; No shortness of breath  CARDIOVASCULAR: No chest pain or palpitations  GASTROINTESTINAL: No abdominal or epigastric pain. No nausea, vomiting, or hematemesis; No constipation. No melena or hematochezia. +Diarrhea  GENITOURINARY: No dysuria, frequency or hematuria  NEUROLOGICAL: No numbness or weakness  SKIN: No itching, burning, rashes, or lesions   All other review of systems is negative unless indicated above    PHYSICAL EXAM:  Constitutional: NAD, awake and alert, well-developed  HEENT: PERR, EOMI, Normal Hearing, MMM  Neck: Soft and supple, No LAD, No JVD  Respiratory: Breath sounds are clear bilaterally, No wheezing, rales or rhonchi  Cardiovascular: S1 and S2, regular rate and rhythm, no Murmurs, gallops or rubs  Gastrointestinal: Bowel Sounds present, soft, nontender, nondistended, no guarding, no rebound  Extremities: Right above knee amputation stump with no erythema or discharge, wound is healing well  Vascular: 2+ peripheral pulses  Neurological: A/O x 3, no focal deficits  Musculoskeletal: 5/5 strength b/l upper and lower extremities  Skin: No rashes        CBC Full  -  ( 07 Jul 2017 05:32 )  WBC Count : 5.9 K/uL  Hemoglobin : 8.6 g/dL  Hematocrit : 27.0 %  Platelet Count - Automated : 220 K/uL      146<H>  |  106  |  47<H>  ----------------------------<  93  2.8<LL>   |  33<H>  |  1.49<H>    Ca    8.1<L>      07 Jul 2017 05:32

## 2017-07-07 NOTE — PROGRESS NOTE ADULT - NSHPATTENDINGPLANDISCUSS_GEN_ALL_CORE
Dr. Clement
Dr. Clemetn
Dr. Fermin
Dr Hernandez

## 2017-07-07 NOTE — PROGRESS NOTE ADULT - PROBLEM SELECTOR PLAN 3
- U/S of L UE reveals thrombosis of superficial veins of left upper extremity  - Warm compresses and elevate the RUE.

## 2017-07-07 NOTE — PROGRESS NOTE ADULT - PROBLEM SELECTOR PROBLEM 4
Diarrhea in adult patient
Diarrhea in adult patient
Gout
CRI (chronic renal insufficiency)
CRI (chronic renal insufficiency)
Diarrhea in adult patient
Gout
Scrotal swelling
Diarrhea in adult patient

## 2017-07-07 NOTE — PROGRESS NOTE ADULT - PROBLEM SELECTOR PLAN 5
- K at 2.9 - repleted.  -f/u in the morning.     - K repleted  - Mag at 1.7  Magnesium, Serum: 1.7 mg/dL (07.04.17 @ 09:59)  - Mag repleted  - Na @ 148:  Encouraged to increase free water intake  - Serial BMPs.

## 2017-07-07 NOTE — PROGRESS NOTE ADULT - ATTENDING COMMENTS
# CKD stage 3   # Hypernatremia  # s/p AKA  with PAD    PLAN  - DC IVF, pt is chronically at this level  - In am start his lasix at 40 mg po qd and dc to rehab with lasix dose  - monitor PO intake of fluid and low salt diet  - dose vanc by levels, fu trend of the trough  - strict i/o
Concur with resident, podiatry to follow.
Patient seen and examined with Dr. Andrzej Bustamante and Dr. Sweta Anderson on the Family Medicine Teaching Service.  Agree with history, physical, labs and plan which were reviewed in detail.
Patient seen and examined with Dr. Andrzej Bustamante, Dr. Nolan Warner and Dr. Tashi Raymond on the Family Medicine Teaching Service.  Agree with history, physical, labs and plan which were reviewed in detail.
Patient seen and examined with Dr. Dr. Nolan Warner and Dr. Tashi Raymond on the Family Medicine Teaching Service.  Agree with history, physical, labs and plan which were reviewed in detail.
Patient seen and examined with Dr. Dr. Nolan Warner and Dr. Tashi Raymond on the Family Medicine Teaching Service.  Agree with history, physical, labs and plan which were reviewed in detail.
Patient seen and examined with Avinash Shearer, Dragan Fisher, Maik Le, Mariel Bell and Kavita Beharry on the Family Medicine Teaching Service.  Agree with history, physical, labs and plan which were reviewed in detail.
Patient seen and examined with Avinash Shearer, Dragan Fisher, Maik Le, Mariel Bell on the Family Medicine Teaching Service.  Agree with history, physical, labs and plan which were reviewed in detail.
Patient seen and examined with Avinash Shearer, Dragan Fisher, Maik Le, Mariel Bell on the Family Medicine Teaching Service.  Agree with history, physical, labs and plan which were reviewed in detail.
Patient seen and examined with Avinash Shearer, Dragan Fisher, Maik Le, Mariel Bell and Kavita Beharry on the Family Medicine Teaching Service.  Agree with history, physical, labs and plan which were reviewed in detail.

## 2017-07-07 NOTE — PROGRESS NOTE ADULT - ASSESSMENT
83 yo man with long hx of CKD and hypernatremia with very variable parameters.  Post right AKA    --CKD   Renal parameters stable  --Hypernatremia   stable with some variability  --Right AKA   continue  abtx and wound care.    7/6  stable creat   await dc     7/7 MK  - CKD stable renal parameters, change lasix to 40mg po QD  - hypokalemia: PO replacement in progress  - diarrhea: improving with cdiff negative  - hypernatremia: no ivf and continue with PO fluid replacement.  chronically in this range

## 2017-07-07 NOTE — PROGRESS NOTE ADULT - SUBJECTIVE AND OBJECTIVE BOX
NEPHROLOGY INTERVAL HPI/OVERNIGHT EVENTS:    HPI:  82 year old male with history of CAD s/p stents (LAD, RCA bare metal around ),PVD (RLE stent/ angioplasty and surgical debridment by Dr Siu  ) GISELLAPaul not on anticoagulation due to GI bleeding, Hypertension, COPD on home O2 2L, SHAYY on nocturnal BIPAP, ex-smoker (smoked 1ppd X 50 years, quit 22 years ago),  Chronic diastolic CHF, Hyperlipidemia, PVD, Iron deficiency anemia, Chronic back pain, Gout, BPH, CKD III with baseline Cr 2. Was ecently admitted to  on   with anemia of GI bleeding, RLE and RUE DVTs,( received pRBCs and IVC filter discharged to rehab with aspirin) was readmitted to   ER on 17 for worsening anemia with Hb 6, worsening leg pain from ulcers and worsening renal function Cr 3.99 now presents from [facility] with anemia (7.7 on labs today decreasing from 9.5 over past few weeks). Pt c/o gradual onset  progressive fatigue over the last couple of weeks. Patient is unable to ambulate because of his leg cellulitis and ulcers. He denies any shortness of breath, palpitations / chest pain / light headedness. According to the daughter the attendants at [facility] did notice dark stools for the last couple of days. Patient denies any abdominal pain or change in bowel or urinary habits.  In ED, + guaiac. (2017 00:37)      Patient is a 82y old  Male who presents with a chief complaint of Right Leg Venous Ulcers (2017 07:52)      MEDICATIONS  (STANDING):  aspirin  chewable 81 milliGRAM(s) Oral daily  heparin  Injectable 5000 Unit(s) SubCutaneous every 8 hours  allopurinol 100 milliGRAM(s) Oral daily  doxazosin 8 milliGRAM(s) Oral at bedtime  fenofibrate Tablet 145 milliGRAM(s) Oral daily  ferrous    sulfate 325 milliGRAM(s) Oral two times a day with meals  gabapentin 300 milliGRAM(s) Oral daily  hydrALAZINE 50 milliGRAM(s) Oral daily  isosorbide   mononitrate ER Tablet (IMDUR) 120 milliGRAM(s) Oral daily  pramipexole 0.125 milliGRAM(s) Oral daily  simvastatin 10 milliGRAM(s) Oral at bedtime  piperacillin/tazobactam IVPB. 3.375 Gram(s) IV Intermittent every 8 hours  ammonium lactate 12% Lotion 1 Application(s) Topical daily  buDESOnide   0.5 milliGRAM(s) Respule 0.5 milliGRAM(s) Inhalation two times a day  metoprolol 25 milliGRAM(s) Oral two times a day  amLODIPine   Tablet 5 milliGRAM(s) Oral daily  pantoprazole    Tablet 40 milliGRAM(s) Oral before breakfast  vancomycin  IVPB 400 milliGRAM(s) IV Intermittent every 12 hours  furosemide    Tablet 40 milliGRAM(s) Oral two times a day  lactobacillus acidophilus 1 Tablet(s) Oral daily  potassium chloride    Tablet ER 40 milliEquivalent(s) Oral every 4 hours    MEDICATIONS  (PRN):  acetaminophen   Tablet. 500 milliGRAM(s) Oral every 6 hours PRN Mild Pain (1 - 3)  ALBUTerol    90 MICROgram(s) HFA Inhaler 2 Puff(s) Inhalation every 6 hours PRN Shortness of Breath  loperamide 2 milliGRAM(s) Oral every 12 hours PRN Diarrhea  HYDROmorphone  Injectable 0.5 milliGRAM(s) IV Push every 4 hours PRN Moderate Pain (4 - 6)  HYDROmorphone  Injectable 1 milliGRAM(s) IV Push every 4 hours PRN Severe Pain (7 - 10)  HYDROmorphone   Tablet 2 milliGRAM(s) Oral three times a day PRN Moderate Pain (4 - 6)      Allergies    No Known Allergies    Intolerances        I&O's Detail    2017 07:01  -  2017 07:00  --------------------------------------------------------  IN:  Total IN: 0 mL    OUT:    Stool: 3 mL    Voided: 300 mL  Total OUT: 303 mL    Total NET: -303 mL          .    Patient was seen and evaluated on dialysis.   Patient is tolerating the procedure well.   Continue dialysis:   Dialyzer:          QB:        QD:   Goal UF ___ over ___ Hours       Vital Signs Last 24 Hrs  T(C): 37.2 (2017 05:20), Max: 37.2 (2017 16:32)  T(F): 98.9 (2017 05:20), Max: 99 (2017 16:32)  HR: 67 (2017 05:20) (65 - 67)  BP: 183/39 (2017 05:20) (163/50 - 183/39)  BP(mean): --  RR: --  SpO2: 99% (2017 05:20) (98% - 99%)  Daily     Daily Weight in k.3 (2017 07:00)    PHYSICAL EXAM:  General: alert. awake Ox3  HEENT: MMM  CV: s1s2 rrr  LUNGS: B/L CTA  EXT: no edema    LABS:                        8.6    5.9   )-----------( 220      ( 2017 05:32 )             27.0     07-    146<H>  |  106  |  47<H>  ----------------------------<  93  2.8<LL>   |  33<H>  |  1.49<H>    Ca    8.1<L>      2017 05:32

## 2017-07-07 NOTE — PROGRESS NOTE ADULT - PROBLEM SELECTOR PLAN 6
- Scrotal ultrasound benign  - Improved scrotal swelling almost back to baseline  - continue Lasix 40mg PO daily.

## 2017-07-07 NOTE — PROGRESS NOTE ADULT - ASSESSMENT
83 y/o male with h/o CAD s/p stents (LAD, RCA bare metal around 9/16), PVD (RLE stent/ angioplasty) complicated with poorly healing right inguinal wound s/p prior surgical debridment (by Dr Siu 5/20 ) [email or web address] not on anticoagulation due to GI bleeding, Hypertension, COPD on home O2 2L, SHAYY on nocturnal BIPAP, Chronic diastolic CHF, Hyperlipidemia, PVD, Iron deficiency anemia, Chronic back pain, Gout, BPH, CKD III with baseline Cr 2, recent 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 was admitted on 6/16 for worsening anemia with Hb 6, worsening leg pain from ulcers and worsening renal function. He has gradual onset  progressive fatigue over the last couple of weeks. Patient is unable to ambulate because of his leg ulcers. In ER, he was started on vancomycin IV and zosyn.    1. Right inguinal open wound with probable infection with mixed GNR, corynebacterium spp and EN spp.  Right leg AKA  -inguinal wound culture shows mixed GNR, EN spp and corynebacterium spp  -on vancomycin 500 mg IV q12h and zosyn 3.375 gm IV q8h # 20  -tolerating abx well so far; no side effects noted  -vancomycin trough level is therapeutic  -reported with diarrhea: stool for CDT is negative; continue on bacid  -local wound care per surgery  -PICC line  -plan for 6 weeks of IV abx coverage  -monitor temps  -f/u CBC  -supportive care  2. Other issues: CAD s/p stents, PVD, [email or web address] not on anticoagulation due to GI bleeding, Hypertension, COPD, SHAYY on nocturnal BIPAP, Chronic diastolic CHF, Hyperlipidemia, PVD, Iron deficiency anemia, Chronic back pain, Gout, BPH, CKD III   -care per medicine

## 2017-07-08 ENCOUNTER — TRANSCRIPTION ENCOUNTER (OUTPATIENT)
Age: 82
End: 2017-07-08

## 2017-07-08 VITALS
TEMPERATURE: 99 F | DIASTOLIC BLOOD PRESSURE: 44 MMHG | RESPIRATION RATE: 16 BRPM | HEART RATE: 71 BPM | OXYGEN SATURATION: 97 % | SYSTOLIC BLOOD PRESSURE: 158 MMHG

## 2017-07-08 LAB
ANION GAP SERPL CALC-SCNC: 7 MMOL/L — SIGNIFICANT CHANGE UP (ref 5–17)
BUN SERPL-MCNC: 49 MG/DL — HIGH (ref 7–23)
CALCIUM SERPL-MCNC: 8.1 MG/DL — LOW (ref 8.5–10.1)
CHLORIDE SERPL-SCNC: 109 MMOL/L — HIGH (ref 96–108)
CO2 SERPL-SCNC: 33 MMOL/L — HIGH (ref 22–31)
CREAT SERPL-MCNC: 1.54 MG/DL — HIGH (ref 0.5–1.3)
GLUCOSE SERPL-MCNC: 94 MG/DL — SIGNIFICANT CHANGE UP (ref 70–99)
HCT VFR BLD CALC: 25.9 % — LOW (ref 39–50)
HGB BLD-MCNC: 8.3 G/DL — LOW (ref 13–17)
MCHC RBC-ENTMCNC: 29.2 PG — SIGNIFICANT CHANGE UP (ref 27–34)
MCHC RBC-ENTMCNC: 32.1 GM/DL — SIGNIFICANT CHANGE UP (ref 32–36)
MCV RBC AUTO: 91.1 FL — SIGNIFICANT CHANGE UP (ref 80–100)
PLATELET # BLD AUTO: 214 K/UL — SIGNIFICANT CHANGE UP (ref 150–400)
POTASSIUM SERPL-MCNC: 3.4 MMOL/L — LOW (ref 3.5–5.3)
POTASSIUM SERPL-SCNC: 3.4 MMOL/L — LOW (ref 3.5–5.3)
RBC # BLD: 2.84 M/UL — LOW (ref 4.2–5.8)
RBC # FLD: 17.4 % — HIGH (ref 10.3–14.5)
SODIUM SERPL-SCNC: 149 MMOL/L — HIGH (ref 135–145)
WBC # BLD: 6.6 K/UL — SIGNIFICANT CHANGE UP (ref 3.8–10.5)
WBC # FLD AUTO: 6.6 K/UL — SIGNIFICANT CHANGE UP (ref 3.8–10.5)

## 2017-07-08 RX ORDER — GABAPENTIN 400 MG/1
2 CAPSULE ORAL
Qty: 0 | Refills: 0 | COMMUNITY

## 2017-07-08 RX ORDER — ACETAMINOPHEN 500 MG
1 TABLET ORAL
Qty: 0 | Refills: 0 | COMMUNITY
Start: 2017-07-08

## 2017-07-08 RX ORDER — ALLOPURINOL 300 MG
1 TABLET ORAL
Qty: 0 | Refills: 0 | COMMUNITY
Start: 2017-07-08

## 2017-07-08 RX ORDER — GABAPENTIN 400 MG/1
1 CAPSULE ORAL
Qty: 0 | Refills: 0 | COMMUNITY
Start: 2017-07-08

## 2017-07-08 RX ORDER — DOXAZOSIN MESYLATE 4 MG
1 TABLET ORAL
Qty: 0 | Refills: 0 | COMMUNITY
Start: 2017-07-08

## 2017-07-08 RX ORDER — LOPERAMIDE HCL 2 MG
1 TABLET ORAL
Qty: 0 | Refills: 0 | COMMUNITY
Start: 2017-07-08

## 2017-07-08 RX ORDER — PIPERACILLIN AND TAZOBACTAM 4; .5 G/20ML; G/20ML
3.38 INJECTION, POWDER, LYOPHILIZED, FOR SOLUTION INTRAVENOUS
Qty: 0 | Refills: 0 | COMMUNITY
Start: 2017-07-08

## 2017-07-08 RX ORDER — PANTOPRAZOLE SODIUM 20 MG/1
1 TABLET, DELAYED RELEASE ORAL
Qty: 0 | Refills: 0 | COMMUNITY
Start: 2017-07-08

## 2017-07-08 RX ORDER — LACTOBACILLUS ACIDOPHILUS 100MM CELL
1 CAPSULE ORAL
Qty: 0 | Refills: 0 | COMMUNITY
Start: 2017-07-08

## 2017-07-08 RX ORDER — VANCOMYCIN HCL 1 G
400 VIAL (EA) INTRAVENOUS
Qty: 0 | Refills: 0 | COMMUNITY
Start: 2017-07-08

## 2017-07-08 RX ORDER — GABAPENTIN 400 MG/1
1 CAPSULE ORAL
Qty: 0 | Refills: 0 | COMMUNITY

## 2017-07-08 RX ORDER — ALBUTEROL 90 UG/1
2 AEROSOL, METERED ORAL
Qty: 0 | Refills: 0 | COMMUNITY
Start: 2017-07-08

## 2017-07-08 RX ORDER — FENOFIBRATE,MICRONIZED 130 MG
1 CAPSULE ORAL
Qty: 0 | Refills: 0 | COMMUNITY
Start: 2017-07-08

## 2017-07-08 RX ORDER — METOPROLOL TARTRATE 50 MG
1 TABLET ORAL
Qty: 0 | Refills: 0 | COMMUNITY
Start: 2017-07-08

## 2017-07-08 RX ORDER — ISOSORBIDE MONONITRATE 60 MG/1
1 TABLET, EXTENDED RELEASE ORAL
Qty: 0 | Refills: 0 | COMMUNITY
Start: 2017-07-08

## 2017-07-08 RX ORDER — POTASSIUM CHLORIDE 20 MEQ
20 PACKET (EA) ORAL ONCE
Qty: 0 | Refills: 0 | Status: COMPLETED | OUTPATIENT
Start: 2017-07-08 | End: 2017-07-08

## 2017-07-08 RX ORDER — SOD,AMMONIUM,POTASSIUM LACTATE
1 CREAM (GRAM) TOPICAL
Qty: 0 | Refills: 0 | COMMUNITY
Start: 2017-07-08

## 2017-07-08 RX ORDER — HYDRALAZINE HCL 50 MG
1 TABLET ORAL
Qty: 0 | Refills: 0 | COMMUNITY
Start: 2017-07-08

## 2017-07-08 RX ORDER — SIMVASTATIN 20 MG/1
1 TABLET, FILM COATED ORAL
Qty: 0 | Refills: 0 | COMMUNITY
Start: 2017-07-08

## 2017-07-08 RX ORDER — PRAMIPEXOLE DIHYDROCHLORIDE 0.12 MG/1
1 TABLET ORAL
Qty: 0 | Refills: 0 | COMMUNITY
Start: 2017-07-08

## 2017-07-08 RX ORDER — AMLODIPINE BESYLATE 2.5 MG/1
1 TABLET ORAL
Qty: 0 | Refills: 0 | COMMUNITY
Start: 2017-07-08

## 2017-07-08 RX ORDER — ACETAMINOPHEN 500 MG
2 TABLET ORAL
Qty: 0 | Refills: 0 | COMMUNITY
Start: 2017-07-08

## 2017-07-08 RX ORDER — FUROSEMIDE 40 MG
1 TABLET ORAL
Qty: 0 | Refills: 0 | COMMUNITY
Start: 2017-07-08

## 2017-07-08 RX ORDER — HYDROMORPHONE HYDROCHLORIDE 2 MG/ML
1 INJECTION INTRAMUSCULAR; INTRAVENOUS; SUBCUTANEOUS
Qty: 0 | Refills: 0 | COMMUNITY
Start: 2017-07-08

## 2017-07-08 RX ORDER — HEPARIN SODIUM 5000 [USP'U]/ML
5000 INJECTION INTRAVENOUS; SUBCUTANEOUS
Qty: 0 | Refills: 0 | COMMUNITY
Start: 2017-07-08

## 2017-07-08 RX ORDER — ASPIRIN/CALCIUM CARB/MAGNESIUM 324 MG
1 TABLET ORAL
Qty: 0 | Refills: 0 | COMMUNITY
Start: 2017-07-08

## 2017-07-08 RX ORDER — DOXAZOSIN MESYLATE 4 MG
1 TABLET ORAL
Qty: 0 | Refills: 0 | COMMUNITY

## 2017-07-08 RX ORDER — NITROGLYCERIN 6.5 MG
1 CAPSULE, EXTENDED RELEASE ORAL
Qty: 0 | Refills: 0 | COMMUNITY

## 2017-07-08 RX ADMIN — Medication 0.5 MILLIGRAM(S): at 09:01

## 2017-07-08 RX ADMIN — Medication 50 MILLIGRAM(S): at 05:56

## 2017-07-08 RX ADMIN — PRAMIPEXOLE DIHYDROCHLORIDE 0.12 MILLIGRAM(S): 0.12 TABLET ORAL at 11:07

## 2017-07-08 RX ADMIN — PIPERACILLIN AND TAZOBACTAM 25 GRAM(S): 4; .5 INJECTION, POWDER, LYOPHILIZED, FOR SOLUTION INTRAVENOUS at 06:59

## 2017-07-08 RX ADMIN — PANTOPRAZOLE SODIUM 40 MILLIGRAM(S): 20 TABLET, DELAYED RELEASE ORAL at 06:29

## 2017-07-08 RX ADMIN — Medication 2 MILLIGRAM(S): at 17:07

## 2017-07-08 RX ADMIN — Medication 25 MILLIGRAM(S): at 17:07

## 2017-07-08 RX ADMIN — Medication 100 MILLIGRAM(S): at 11:07

## 2017-07-08 RX ADMIN — Medication 1 TABLET(S): at 11:07

## 2017-07-08 RX ADMIN — Medication 100 MILLIGRAM(S): at 05:56

## 2017-07-08 RX ADMIN — Medication 25 MILLIGRAM(S): at 05:56

## 2017-07-08 RX ADMIN — ISOSORBIDE MONONITRATE 120 MILLIGRAM(S): 60 TABLET, EXTENDED RELEASE ORAL at 11:07

## 2017-07-08 RX ADMIN — Medication 100 MILLIGRAM(S): at 17:41

## 2017-07-08 RX ADMIN — Medication 500 MILLIGRAM(S): at 13:55

## 2017-07-08 RX ADMIN — Medication 20 MILLIEQUIVALENT(S): at 07:58

## 2017-07-08 RX ADMIN — Medication 325 MILLIGRAM(S): at 17:07

## 2017-07-08 RX ADMIN — HEPARIN SODIUM 5000 UNIT(S): 5000 INJECTION INTRAVENOUS; SUBCUTANEOUS at 13:56

## 2017-07-08 RX ADMIN — Medication 40 MILLIGRAM(S): at 05:56

## 2017-07-08 RX ADMIN — Medication 1 APPLICATION(S): at 11:08

## 2017-07-08 RX ADMIN — Medication 2 MILLIGRAM(S): at 05:59

## 2017-07-08 RX ADMIN — Medication 325 MILLIGRAM(S): at 07:58

## 2017-07-08 RX ADMIN — Medication 500 MILLIGRAM(S): at 14:44

## 2017-07-08 RX ADMIN — Medication 145 MILLIGRAM(S): at 11:08

## 2017-07-08 RX ADMIN — HEPARIN SODIUM 5000 UNIT(S): 5000 INJECTION INTRAVENOUS; SUBCUTANEOUS at 05:56

## 2017-07-08 RX ADMIN — GABAPENTIN 300 MILLIGRAM(S): 400 CAPSULE ORAL at 11:08

## 2017-07-08 RX ADMIN — PIPERACILLIN AND TAZOBACTAM 25 GRAM(S): 4; .5 INJECTION, POWDER, LYOPHILIZED, FOR SOLUTION INTRAVENOUS at 13:54

## 2017-07-08 RX ADMIN — AMLODIPINE BESYLATE 5 MILLIGRAM(S): 2.5 TABLET ORAL at 05:56

## 2017-07-08 RX ADMIN — Medication 81 MILLIGRAM(S): at 11:07

## 2017-07-08 NOTE — DISCHARGE NOTE ADULT - HOSPITAL COURSE
Patient is a 81 y/o/m with past medical history of CAD (s/p stents - LAD, RCA bare metal around 9/16), PVD (RLE stent/ angioplasty and surgical debridment by Dr Siu 5/20 ), A.Fib (not on anticoagulation due to GI bleeding), COPD on home O2 2L, SHAYY (on nocturnal BIPAP), ex-smoker (smoked 1ppd X 50 years, quit 22 years ago),  Chronic diastolic CHF, Hyperlipidemia, PVD, Iron deficiency anemia, CKD III (baseline Cr 2). Recently admitted to  on  4/17 with anemia of GI bleeding, RLE and RUE DVTs,( received pRBCs and IVC filter discharged to rehab with aspirin) was readmitted to   ER on 5/4/17 for worsening anemia with Hb 6, worsening leg pain from ulcers and worsening renal function Cr 3.99 now presents from [facility] with anemia (7.7 on labs today decreasing from 9.5 over past few weeks). Pt c/o gradual onset  progressive fatigue over the last couple of weeks. Patient is unable to ambulate because of his leg cellulitis and ulcers.  According to the daughter the attendants at [facility] did notice dark stools for the last couple of days. Patient denies any abdominal pain or change in bowel or urinary habits.  In ED, (+) guaiac.     7/4/17  - s/p Right AKA POD # 7  for f/u pre-ulcerative lesion of the left heel.     7/8: clinically looks great. Denies any HA, CP, SOB. Still with 4 episodes of diarrhea. Labs and vitals reviewed. comfortable. family wants patient to be discharged to rehab. I have extensively reviewed patient's extensive complicated medical history.       CBC Full  -  ( 08 Jul 2017 05:31 )  WBC Count : 6.6 K/uL  Hemoglobin : 8.3 g/dL  Hematocrit : 25.9 %  Platelet Count - Automated : 214 K/uL  Mean Cell Volume : 91.1 fl  Mean Cell Hemoglobin : 29.2 pg  Mean Cell Hemoglobin Concentration : 32.1 gm/dL  Auto Neutrophil # : x  Auto Lymphocyte # : x  Auto Monocyte # : x  Auto Eosinophil # : x  Auto Basophil # : x  Auto Neutrophil % : x  Auto Lymphocyte % : x  Auto Monocyte % : x  Auto Eosinophil % : x  Auto Basophil % : x        07-08    149<H>  |  109<H>  |  49<H>  ----------------------------<  94  3.4<L>   |  33<H>  |  1.54<H>    Ca    8.1<L>      08 Jul 2017 05:31    # Anemia of chronic dz  - discharge H.H 8.3  - no signs of bleeding    # Right inguinal open wound with probable infection with mixed GNR, corynebacterium spp and EN spp.  Right leg AKA  - inguinal wound culture shows mixed GNR, EN spp and corynebacterium spp  - on vancomycin 500 mg IV q12h and zosyn 3.375 gm IV q8h # 21  - tolerating abx well so far; no side effects noted  - vancomycin trough level is therapeutic  - reported with diarrhea: stool for CDT is negative; continue on bacid  - local wound care per surgery  - PICC line  - plan for 6 weeks of IV abx coverage    # Acute thrombosis of superficial veins of left upper extremity  - U/S of L UE reveals thrombosis of superficial veins of left upper extremity  - Warm compresses and elevate the RUE.     # Diarrhea   - Currently on vancomycin/zosyn and have been experiencing diarrhea since Friday 6/30  - C Diff negative  - No fever, abdominal pain  - continue immodium prn  - GI follow up with Dr. Mcgregor    #  Scrotal swelling  - Scrotal ultrasound benign  - Improved scrotal swelling almost back to baseline  - continue Lasix 40mg PO daily.    # Hypertension  - Continue Amlodipine 5mg  - Continue Doxazosin 8mg  - Continue Metoprolol 25mg  - hydralazine 50 mg Oral daily.     # CAD (coronary artery disease)   - isosorbide mononitrate 120 mg Oral daily  - Continue Simvastatin  10mg  - Continue Aspirin 81mg daily  - Continue Fenofibrate 145mg.     # COPD (chronic obstructive pulmonary disease).  Plan: - Former smoker  - 3L O2 NC  - Continue Albuterol 90 mcg  inhaler q6hrs PRN for SOB. Patient is a 83 y/o/m with past medical history of CAD (s/p stents - LAD, RCA bare metal around 9/16), PVD (RLE stent/ angioplasty and surgical debridment by Dr Siu 5/20 ), A.Fib (not on anticoagulation due to GI bleeding), COPD on home O2 2L, SHAYY (on nocturnal BIPAP), ex-smoker (smoked 1ppd X 50 years, quit 22 years ago),  Chronic diastolic CHF, Hyperlipidemia, PVD, Iron deficiency anemia, CKD III (baseline Cr 2). Recently admitted to  on  4/17 with anemia of GI bleeding, RLE and RUE DVTs,( received pRBCs and IVC filter discharged to rehab with aspirin) was readmitted to   ER on 5/4/17 for worsening anemia with Hb 6, worsening leg pain from ulcers and worsening renal function Cr 3.99 now presents from [facility] with anemia (7.7 on labs today decreasing from 9.5 over past few weeks). Pt c/o gradual onset  progressive fatigue over the last couple of weeks. Patient is unable to ambulate because of his leg cellulitis and ulcers.  According to the daughter the attendants at [facility] did notice dark stools for the last couple of days. Patient denies any abdominal pain or change in bowel or urinary habits.  In ED, (+) guaiac.     7/4/17  - s/p Right AKA POD # 7  for f/u pre-ulcerative lesion of the left heel.     7/8: clinically looks great. Denies any HA, CP, SOB. Still with 4 episodes of diarrhea. Labs and vitals reviewed. comfortable. family wants patient to be discharged to rehab. I have extensively reviewed patient's extensive complicated medical history.       CBC Full  -  ( 08 Jul 2017 05:31 )  WBC Count : 6.6 K/uL  Hemoglobin : 8.3 g/dL  Hematocrit : 25.9 %  Platelet Count - Automated : 214 K/uL  Mean Cell Volume : 91.1 fl  Mean Cell Hemoglobin : 29.2 pg  Mean Cell Hemoglobin Concentration : 32.1 gm/dL  Auto Neutrophil # : x  Auto Lymphocyte # : x  Auto Monocyte # : x  Auto Eosinophil # : x  Auto Basophil # : x  Auto Neutrophil % : x  Auto Lymphocyte % : x  Auto Monocyte % : x  Auto Eosinophil % : x  Auto Basophil % : x        07-08    149<H>  |  109<H>  |  49<H>  ----------------------------<  94  3.4<L>   |  33<H>  |  1.54<H>    Ca    8.1<L>      08 Jul 2017 05:31    # Anemia of chronic dz / iron deficiency / ? GI source as per GI note  - discharge H.H 8.3  - no signs of bleeding    # Right inguinal open wound with probable infection with mixed GNR, corynebacterium spp and EN spp.  Right leg AKA  - inguinal wound culture shows mixed GNR, EN spp and corynebacterium spp  - on vancomycin 500 mg IV q12h and zosyn 3.375 gm IV q8h # 21  - tolerating abx well so far; no side effects noted  - vancomycin trough level is therapeutic  - reported with diarrhea: stool for CDT is negative; continue on bacid  - local wound care per surgery  - PICC line  - plan for 6 weeks of IV abx coverage    # Acute thrombosis of superficial veins of left upper extremity  - U/S of L UE reveals thrombosis of superficial veins of left upper extremity  - Warm compresses and elevate the RUE.     # Diarrhea   - Currently on vancomycin/zosyn and have been experiencing diarrhea since Friday 6/30  - C Diff negative  - No fever, abdominal pain  - continue immodium prn  - GI follow up with Dr. Mcgregor    #  Scrotal swelling  - Scrotal ultrasound benign  - Improved scrotal swelling almost back to baseline  - continue Lasix 40mg PO daily.    # Hypertension  - Continue Amlodipine 5mg  - Continue Doxazosin 8mg  - Continue Metoprolol 25mg  - hydralazine 50 mg Oral daily.     # CAD (coronary artery disease)   - isosorbide mononitrate 120 mg Oral daily  - Continue Simvastatin  10mg  - Continue Aspirin 81mg daily  - Continue Fenofibrate 145mg.     # COPD (chronic obstructive pulmonary disease).  Plan: - Former smoker  - 3L O2 NC  - Continue Albuterol 90 mcg  inhaler q6hrs PRN for SOB. Patient is a 81 y/o/m with past medical history of CAD (s/p stents - LAD, RCA bare metal around 9/16), PVD (RLE stent/ angioplasty and surgical debridment by Dr Siu 5/20 ), A.Fib (not on anticoagulation due to GI bleeding), COPD on home O2 2L, SHAYY (on nocturnal BIPAP), ex-smoker (smoked 1ppd X 50 years, quit 22 years ago),  Chronic diastolic CHF, Hyperlipidemia, PVD, Iron deficiency anemia, CKD III (baseline Cr 2). Recently admitted to  on  4/17 with anemia of GI bleeding, RLE and RUE DVTs,( received pRBCs and IVC filter discharged to rehab with aspirin) was readmitted to   ER on 5/4/17 for worsening anemia with Hb 6, worsening leg pain from ulcers and worsening renal function Cr 3.99 now presents from [facility] with anemia (7.7 on labs today decreasing from 9.5 over past few weeks). Pt c/o gradual onset  progressive fatigue over the last couple of weeks. Patient is unable to ambulate because of his leg cellulitis and ulcers.  According to the daughter the attendants at [facility] did notice dark stools for the last couple of days. Patient denies any abdominal pain or change in bowel or urinary habits.  In ED, (+) guaiac.     7/4/17  - s/p Right AKA POD # 7  for f/u pre-ulcerative lesion of the left heel.     7/8: clinically looks great. Denies any HA, CP, SOB. Still with 4 episodes of diarrhea. Labs and vitals reviewed. comfortable. family wants patient to be discharged to rehab. I have extensively reviewed patient's extensive complicated medical history.       Physical Exam:   GENERAL APPEARANCE:  NAD, hemodynamically stable, elderly, deconditioned  T(C): 36.7 (07-08-17 @ 11:00), Max: 37.1 (07-08-17 @ 05:50)  HR: 75 (07-08-17 @ 11:00) (68 - 82)  BP: 153/37 (07-08-17 @ 11:00) (153/37 - 156/40)  RR: 18 (07-08-17 @ 11:00) (16 - 18)  SpO2: 97% (07-08-17 @ 11:00) (97% - 97%)  HEENT:  Head is normocephalic    Skin:  Warm and dry without any rash   NECK:  Supple without lymphadenopathy.   HEART:  normal S1 and S2, No M/R/G  LUNGS:  Good ins/exp effort, no W/R/R/C  ABDOMEN:  Soft, nontender, nondistended with good bowel sounds heard, scrotal edema  EXTREMITIES:  (R) lower extremity amputation   NEUROLOGICAL:  Gross nonfocal, AO&A x3.     CBC Full  -  ( 08 Jul 2017 05:31 )  WBC Count : 6.6 K/uL  Hemoglobin : 8.3 g/dL  Hematocrit : 25.9 %  Platelet Count - Automated : 214 K/uL  Mean Cell Volume : 91.1 fl  Mean Cell Hemoglobin : 29.2 pg  Mean Cell Hemoglobin Concentration : 32.1 gm/dL          07-08    149<H>  |  109<H>  |  49<H>  ----------------------------<  94  3.4<L>   |  33<H>  |  1.54<H>    Ca    8.1<L>      08 Jul 2017 05:31    # Anemia of chronic dz / iron deficiency / ? GI source as per GI note  - discharge H.H 8.3  - no signs of bleeding    # Right inguinal open wound with probable infection with mixed GNR, corynebacterium spp and EN spp.  Right leg AKA  - inguinal wound culture shows mixed GNR, EN spp and corynebacterium spp  - on vancomycin 500 mg IV q12h and zosyn 3.375 gm IV q8h # 21  - tolerating abx well so far; no side effects noted  - vancomycin trough level is therapeutic  - reported with diarrhea: stool for CDT is negative; continue on bacid  - local wound care per surgery  - PICC line  - plan for 6 weeks of IV abx coverage    # Acute thrombosis of superficial veins of left upper extremity  - U/S of L UE reveals thrombosis of superficial veins of left upper extremity  - Warm compresses and elevate the RUE.     # Diarrhea   - Currently on vancomycin/zosyn and have been experiencing diarrhea since Friday 6/30  - C Diff negative  - No fever, abdominal pain  - continue immodium prn  - GI follow up with Dr. Mcgregor    #  Scrotal swelling  - Scrotal ultrasound benign  - Improved scrotal swelling almost back to baseline  - continue Lasix 40mg PO daily.    # Hypertension  - Continue Amlodipine 5mg  - Continue Doxazosin 8mg  - Continue Metoprolol 25mg  - hydralazine 50 mg Oral daily.     # CAD (coronary artery disease)   - isosorbide mononitrate 120 mg Oral daily  - Continue Simvastatin  10mg  - Continue Aspirin 81mg daily  - Continue Fenofibrate 145mg.     # COPD (chronic obstructive pulmonary disease).  Plan: - Former smoker  - 3L O2 NC  - Continue Albuterol 90 mcg  inhaler q6hrs PRN for SOB.

## 2017-07-08 NOTE — PROGRESS NOTE ADULT - PROVIDER SPECIALTY LIST ADULT
Family Medicine
Gastroenterology
Hospitalist
Infectious Disease
Nephrology
Podiatry
Vascular Surgery
Family Medicine
Family Medicine
Podiatry
Vascular Surgery
Family Medicine

## 2017-07-08 NOTE — DISCHARGE NOTE ADULT - PATIENT PORTAL LINK FT
“You can access the [employer] Patient Portal, offered by [facility], by registering with the following website: [email or web address]”

## 2017-07-08 NOTE — PROVIDER CONTACT NOTE (OTHER) - DATE AND TIME:
17-Jun-2017 08:10
08-Jul-2017 11:02
17-Jun-2017 05:01
17-Jun-2017 05:59
21-Jun-2017 18:12
29-Jun-2017 16:40

## 2017-07-08 NOTE — DISCHARGE NOTE ADULT - CARE PROVIDER_API CALL
Irma Tomas), Internal Medicine; Nephrology  [address]  [place] [address]  Phone: [phone]  Fax: [phone]    Washington Burleson), Infectious Disease; Internal Medicine  [address] [place] [address] [address]  [place] [address]  Phone: [phone]  Fax: [phone]

## 2017-07-08 NOTE — PROGRESS NOTE ADULT - SUBJECTIVE AND OBJECTIVE BOX
Patient is a 82y old  Male who presents with a chief complaint of Anemia (08 Jul 2017 11:50)      Subective:  Has been having diarrhea, 4 small BMs overnight. no blood. no abdominal pain. C. diff was negative    PAST MEDICAL & SURGICAL HISTORY:  Sepsis, due to unspecified organism: 2/2 poorly healing wounds b/l  Dyspepsia: On moderate exertion.  Sleep apnea, obstructive: Requires home 02 therapy, and treatment with BIPAP  Atelectasis  Pleural effusion, bilateral  Respiratory failure  Peripheral edema  CRI (chronic renal insufficiency)  Gout  Benign prostatic hypertrophy  Spinal stenosis  Hypercholesterolemia  GERD (gastroesophageal reflux disease)  CAD (coronary artery disease)  Hypertension  S/P angioplasty with stent  Cataract of left eye  Prostate: Surgery green light procedure.  S/P rotator cuff surgery: Right  S/P angioplasty      MEDICATIONS  (STANDING):  aspirin  chewable 81 milliGRAM(s) Oral daily  heparin  Injectable 5000 Unit(s) SubCutaneous every 8 hours  allopurinol 100 milliGRAM(s) Oral daily  doxazosin 8 milliGRAM(s) Oral at bedtime  fenofibrate Tablet 145 milliGRAM(s) Oral daily  ferrous    sulfate 325 milliGRAM(s) Oral two times a day with meals  gabapentin 300 milliGRAM(s) Oral daily  hydrALAZINE 50 milliGRAM(s) Oral daily  isosorbide   mononitrate ER Tablet (IMDUR) 120 milliGRAM(s) Oral daily  pramipexole 0.125 milliGRAM(s) Oral daily  simvastatin 10 milliGRAM(s) Oral at bedtime  piperacillin/tazobactam IVPB. 3.375 Gram(s) IV Intermittent every 8 hours  ammonium lactate 12% Lotion 1 Application(s) Topical daily  buDESOnide   0.5 milliGRAM(s) Respule 0.5 milliGRAM(s) Inhalation two times a day  metoprolol 25 milliGRAM(s) Oral two times a day  amLODIPine   Tablet 5 milliGRAM(s) Oral daily  pantoprazole    Tablet 40 milliGRAM(s) Oral before breakfast  vancomycin  IVPB 400 milliGRAM(s) IV Intermittent every 12 hours  lactobacillus acidophilus 1 Tablet(s) Oral daily  furosemide    Tablet 40 milliGRAM(s) Oral daily    MEDICATIONS  (PRN):  acetaminophen   Tablet. 500 milliGRAM(s) Oral every 6 hours PRN Mild Pain (1 - 3)  ALBUTerol    90 MICROgram(s) HFA Inhaler 2 Puff(s) Inhalation every 6 hours PRN Shortness of Breath  loperamide 2 milliGRAM(s) Oral every 12 hours PRN Diarrhea  HYDROmorphone  Injectable 0.5 milliGRAM(s) IV Push every 4 hours PRN Moderate Pain (4 - 6)  HYDROmorphone  Injectable 1 milliGRAM(s) IV Push every 4 hours PRN Severe Pain (7 - 10)  HYDROmorphone   Tablet 2 milliGRAM(s) Oral three times a day PRN Moderate Pain (4 - 6)      REVIEW OF SYSTEMS:    RESPIRATORY: No shortness of breath  CARDIOVASCULAR: No chest pain  All other review of systems is negative unless indicated above.    Vital Signs Last 24 Hrs  T(C): 36.7 (08 Jul 2017 11:00), Max: 37.1 (08 Jul 2017 05:50)  T(F): 98 (08 Jul 2017 11:00), Max: 98.8 (08 Jul 2017 05:50)  HR: 75 (08 Jul 2017 11:00) (68 - 82)  BP: 153/37 (08 Jul 2017 11:00) (153/37 - 156/40)  BP(mean): 69 (08 Jul 2017 05:50) (69 - 69)  RR: 18 (08 Jul 2017 11:00) (16 - 18)  SpO2: 97% (08 Jul 2017 11:00) (97% - 97%)    PHYSICAL EXAM:    Constitutional: chronically ill appearing  Cardiovascular: S1 and S2, RRR  Gastrointestinal: BS+, soft, NT/ND  Extremities: No peripheral edema  Psychiatric: Normal mood, normal affect    LABS:                        8.3    6.6   )-----------( 214      ( 08 Jul 2017 05:31 )             25.9     07-08    149<H>  |  109<H>  |  49<H>  ----------------------------<  94  3.4<L>   |  33<H>  |  1.54<H>    Ca    8.1<L>      08 Jul 2017 05:31            RADIOLOGY & ADDITIONAL STUDIES:

## 2017-07-08 NOTE — DISCHARGE NOTE ADULT - PLAN OF CARE
- discharge H/H - 8.3 - multifactorial due to iron deficiency / anemia of chronic dz/ (+) guaiac   - closely monitor H.H - ASA / Metoprolol 25 mg po BID / simvastatin 10 mg po qdaily / fenofibrate 145 mg po qdaily - albuterol / proventol inh  - budesonide inh  - continues home O2 2L - stable electrolytes  - closely follow up nephrology - with Dr. Tomas / Dr. Zamora discharge Cr -  1.54 - no further bleed # Right inguinal open wound with probable infection with mixed GNR, corynebacterium spp and EN spp.  Right leg AKA  - inguinal wound culture shows mixed GNR, EN spp and corynebacterium spp  - on vancomycin 500 mg IV q12h and zosyn 3.375 gm IV q8h # 21, last dose 7/29/2017 - Needs to follow up with Dr. Burleson.   - tolerating abx well so far; no side effects noted  - vancomycin trough level is therapeutic  - reported with diarrhea: stool for CDT is negative; continue on bacid  - local wound care per surgery  - PICC line  - plan for 6 weeks of IV abx coverage

## 2017-07-08 NOTE — DISCHARGE NOTE ADULT - CARE PLAN
Principal Discharge DX:	Anemia  Goal:	- discharge H/H - 8.3  Instructions for follow-up, activity and diet:	- multifactorial due to iron deficiency / anemia of chronic dz/ (+) guaiac   - closely monitor H.H  Secondary Diagnosis:	Coronary artery disease without angina pectoris, unspecified vessel or lesion type, unspecified whether native or transplanted heart  Instructions for follow-up, activity and diet:	- ASA / Metoprolol 25 mg po BID / simvastatin 10 mg po qdaily / fenofibrate 145 mg po qdaily  Secondary Diagnosis:	Chronic obstructive pulmonary disease, unspecified COPD type  Instructions for follow-up, activity and diet:	- albuterol / proventol inh  - budesonide inh  - continues home O2 2L  Secondary Diagnosis:	CRI (chronic renal insufficiency), stage 2 (mild)  Goal:	discharge Cr -  1.54  Instructions for follow-up, activity and diet:	- stable electrolytes  - closely follow up nephrology - with Dr. Tomas / Dr. Zamora  Secondary Diagnosis:	Gastrointestinal hemorrhage, unspecified gastrointestinal hemorrhage type  Instructions for follow-up, activity and diet:	- no further bleed  Secondary Diagnosis:	Sepsis, due to unspecified organism  Instructions for follow-up, activity and diet:	# Right inguinal open wound with probable infection with mixed GNR, corynebacterium spp and EN spp.  Right leg AKA  - inguinal wound culture shows mixed GNR, EN spp and corynebacterium spp  - on vancomycin 500 mg IV q12h and zosyn 3.375 gm IV q8h # 21, last dose 7/29/2017 - Needs to follow up with Dr. Burleson.   - tolerating abx well so far; no side effects noted  - vancomycin trough level is therapeutic  - reported with diarrhea: stool for CDT is negative; continue on bacid  - local wound care per surgery  - PICC line  - plan for 6 weeks of IV abx coverage

## 2017-07-08 NOTE — DISCHARGE NOTE ADULT - MEDICATION SUMMARY - MEDICATIONS TO TAKE
I will START or STAY ON the medications listed below when I get home from the hospital:    budesonide 0.5 mg/2 mL inhalation suspension  -- 2 milliliter(s) inhaled 2 times a day, As Needed  -- Indication: For COPD (chronic obstructive pulmonary disease)    acetaminophen 500 mg oral tablet  -- 1 tab(s) by mouth every 6 hours, As needed, Mild Pain (1 - 3)  -- Indication: For Pain    HYDROmorphone 2 mg oral tablet  -- 1 tab(s) by mouth 3 times a day, As needed, Moderate Pain (4 - 6)  -- Indication: For Pain    aspirin 81 mg oral tablet, chewable  -- 1 tab(s) by mouth once a day  -- Indication: For Coronary artery disease involving native coronary artery of native heart without angina pectoris    doxazosin 8 mg oral tablet  -- 1 tab(s) by mouth once a day (at bedtime)  -- Indication: For BPH    isosorbide mononitrate 120 mg oral tablet, extended release  -- 1 tab(s) by mouth once a day  -- Indication: For CAD (coronary artery disease)    heparin  -- 5000 unit(s) subcutaneous every 8 hours  -- Indication: For DVT proph    gabapentin 300 mg oral capsule  -- 1 cap(s) by mouth once a day  -- Indication: For neuropathic pain    loperamide 2 mg oral capsule  -- 1 cap(s) by mouth every 12 hours, As needed, Diarrhea  -- Indication: For -     allopurinol 100 mg oral tablet  -- 1 tab(s) by mouth once a day  -- Indication: For Gout    fenofibrate 145 mg oral tablet  -- 1 tab(s) by mouth once a day  -- Indication: For HLD    simvastatin 10 mg oral tablet  -- 1 tab(s) by mouth once a day (at bedtime)  -- Indication: For CAD (coronary artery disease)    pramipexole 0.125 mg oral tablet  -- 1 tab(s) by mouth once a day  -- Indication: For -     metoprolol tartrate 25 mg oral tablet  -- 1 tab(s) by mouth 2 times a day  -- Indication: For CAD (coronary artery disease)    albuterol 90 mcg/inh inhalation aerosol  -- 2 puff(s) inhaled every 6 hours, As needed, Shortness of Breath  -- Indication: For COPD (chronic obstructive pulmonary disease)    amLODIPine 5 mg oral tablet  -- 1 tab(s) by mouth once a day  -- Indication: For HTN    ammonium lactate 12% topical lotion  -- 1 application on skin once a day  -- Indication: For -     furosemide 40 mg oral tablet  -- 1 tab(s) by mouth once a day  -- Indication: For Chronic diastolic CHF    vancomycin  -- 400 milligram(s) intravenous every 12 hours; end on 7/29  -- Indication: For Infection    ferrous sulfate 325 mg (65 mg elemental iron) oral delayed release tablet  -- 1 tab(s) by mouth 3 times a day  -- Indication: For iron deficiency    Fish Oil 1000 mg oral capsule  -- 1 cap(s) by mouth 2 times a day  -- Indication: For -     Chondroitin-Glucosamine 200 mg-250 mg oral capsule  -- 1 cap(s) by mouth 2 times a day  -- Indication: For -     piperacillin-tazobactam 2 g-0.25 g intravenous injection  -- 3.375 gram(s) intravenous via PICC line every 8 hours; stop on 7/29   -- Indication: For infection    lactobacillus acidophilus oral capsule  -- 1 tab(s) by mouth once a day  -- Indication: For -     pantoprazole 40 mg oral delayed release tablet  -- 1 tab(s) by mouth once a day (before a meal)  -- Indication: For GERD    hydrALAZINE 50 mg oral tablet  -- 1 tab(s) by mouth once a day  -- Indication: For HTN    Vitamin B Compound Strong  -- 1 tab(s) by mouth once a day  -- Indication: For -     Vitamin C 1000 mg oral tablet  -- 1 tab(s) by mouth once a day  -- Indication: For -     Vitamin D3 2000 intl units oral capsule  -- 1 cap(s) by mouth once a day  -- Indication: For - I will START or STAY ON the medications listed below when I get home from the hospital:    budesonide 0.5 mg/2 mL inhalation suspension  -- 2 milliliter(s) inhaled 2 times a day, As Needed  -- Indication: For COPD (chronic obstructive pulmonary disease)    acetaminophen 500 mg oral tablet  -- 1 tab(s) by mouth every 6 hours, As needed, Mild Pain (1 - 3)  -- Indication: For Pain    HYDROmorphone 2 mg oral tablet  -- 1 tab(s) by mouth 3 times a day, As needed, Moderate Pain (4 - 6)  -- Indication: For Pain    aspirin 81 mg oral tablet, chewable  -- 1 tab(s) by mouth once a day  -- Indication: For Coronary artery disease involving native coronary artery of native heart without angina pectoris    doxazosin 8 mg oral tablet  -- 1 tab(s) by mouth once a day (at bedtime)  -- Indication: For BPH    isosorbide mononitrate 120 mg oral tablet, extended release  -- 1 tab(s) by mouth once a day  -- Indication: For CAD (coronary artery disease)    heparin  -- 5000 unit(s) subcutaneous every 8 hours  -- Indication: For DVT proph    gabapentin 300 mg oral capsule  -- 1 cap(s) by mouth once a day  -- Indication: For neuropathic pain    loperamide 2 mg oral capsule  -- 1 cap(s) by mouth every 12 hours x 3 days ; then prn q12h for diarrhea   -- Indication: For Diarrhea in adult patient    allopurinol 100 mg oral tablet  -- 1 tab(s) by mouth once a day  -- Indication: For Gout    fenofibrate 145 mg oral tablet  -- 1 tab(s) by mouth once a day  -- Indication: For HLD    simvastatin 10 mg oral tablet  -- 1 tab(s) by mouth once a day (at bedtime)  -- Indication: For CAD (coronary artery disease)    pramipexole 0.125 mg oral tablet  -- 1 tab(s) by mouth once a day  -- Indication: For -     metoprolol tartrate 25 mg oral tablet  -- 1 tab(s) by mouth 2 times a day  -- Indication: For CAD (coronary artery disease)    albuterol 90 mcg/inh inhalation aerosol  -- 2 puff(s) inhaled every 6 hours, As needed, Shortness of Breath  -- Indication: For COPD (chronic obstructive pulmonary disease)    amLODIPine 5 mg oral tablet  -- 1 tab(s) by mouth once a day  -- Indication: For HTN    ammonium lactate 12% topical lotion  -- 1 application on skin once a day  -- Indication: For -     furosemide 40 mg oral tablet  -- 1 tab(s) by mouth once a day  -- Indication: For Chronic diastolic CHF    vancomycin  -- 400 milligram(s) intravenous every 12 hours; end on 7/29  -- Indication: For Infection    ferrous sulfate 325 mg (65 mg elemental iron) oral delayed release tablet  -- 1 tab(s) by mouth 3 times a day  -- Indication: For iron deficiency    Fish Oil 1000 mg oral capsule  -- 1 cap(s) by mouth 2 times a day  -- Indication: For -     Chondroitin-Glucosamine 200 mg-250 mg oral capsule  -- 1 cap(s) by mouth 2 times a day  -- Indication: For -     piperacillin-tazobactam 2 g-0.25 g intravenous injection  -- 3.375 gram(s) intravenous via PICC line every 8 hours; stop on 7/29   -- Indication: For infection    lactobacillus acidophilus oral capsule  -- 1 tab(s) by mouth once a day  -- Indication: For -     pantoprazole 40 mg oral delayed release tablet  -- 1 tab(s) by mouth once a day (before a meal)  -- Indication: For GERD    hydrALAZINE 50 mg oral tablet  -- 1 tab(s) by mouth once a day  -- Indication: For HTN    Vitamin B Compound Strong  -- 1 tab(s) by mouth once a day  -- Indication: For -     Vitamin C 1000 mg oral tablet  -- 1 tab(s) by mouth once a day  -- Indication: For -     Vitamin D3 2000 intl units oral capsule  -- 1 cap(s) by mouth once a day  -- Indication: For -

## 2017-07-08 NOTE — PROGRESS NOTE ADULT - SUBJECTIVE AND OBJECTIVE BOX
NEPHROLOGY INTERVAL HPI/OVERNIGHT EVENTS:  7/8 SY  No acute events.  Comfortable  Continues with diarrhea though slightly better.    7/5 SY  Resting fairly comfortably.  No complaints.    83 y/o wm with hx of CKD stage 3 ( scr in low 2's in past)  presents from [facility] with anemia with hgb of 7.7..      During course of hospitalizeation, pt transfused and EGD was held due to his high risk.    Noted with leg ulcer that was wornseing and with rt groin wound debridement with s/p now AKA earlier this week.    PICC line placed for tx of malcolm revealing GNR with ENspp and corynebacteriam.  on Vanc and Zosyn with total of 6 weeks of abx planned.      Now with variable renal function in setting of higher vanc trough with Na values in 148.  Trial of IVF given and started this AM        MEDICATIONS  (STANDING):  aspirin  chewable 81 milliGRAM(s) Oral daily  heparin  Injectable 5000 Unit(s) SubCutaneous every 8 hours  allopurinol 100 milliGRAM(s) Oral daily  doxazosin 8 milliGRAM(s) Oral at bedtime  fenofibrate Tablet 145 milliGRAM(s) Oral daily  ferrous    sulfate 325 milliGRAM(s) Oral two times a day with meals  gabapentin 300 milliGRAM(s) Oral daily  hydrALAZINE 50 milliGRAM(s) Oral daily  isosorbide   mononitrate ER Tablet (IMDUR) 120 milliGRAM(s) Oral daily  pramipexole 0.125 milliGRAM(s) Oral daily  simvastatin 10 milliGRAM(s) Oral at bedtime  piperacillin/tazobactam IVPB. 3.375 Gram(s) IV Intermittent every 8 hours  ammonium lactate 12% Lotion 1 Application(s) Topical daily  buDESOnide   0.5 milliGRAM(s) Respule 0.5 milliGRAM(s) Inhalation two times a day  metoprolol 25 milliGRAM(s) Oral two times a day  amLODIPine   Tablet 5 milliGRAM(s) Oral daily  pantoprazole    Tablet 40 milliGRAM(s) Oral before breakfast  vancomycin  IVPB 400 milliGRAM(s) IV Intermittent every 12 hours  lactobacillus acidophilus 1 Tablet(s) Oral daily  furosemide    Tablet 40 milliGRAM(s) Oral daily    MEDICATIONS  (PRN):  acetaminophen   Tablet. 500 milliGRAM(s) Oral every 6 hours PRN Mild Pain (1 - 3)  ALBUTerol    90 MICROgram(s) HFA Inhaler 2 Puff(s) Inhalation every 6 hours PRN Shortness of Breath  loperamide 2 milliGRAM(s) Oral every 12 hours PRN Diarrhea  HYDROmorphone  Injectable 0.5 milliGRAM(s) IV Push every 4 hours PRN Moderate Pain (4 - 6)  HYDROmorphone  Injectable 1 milliGRAM(s) IV Push every 4 hours PRN Severe Pain (7 - 10)  HYDROmorphone   Tablet 2 milliGRAM(s) Oral three times a day PRN Moderate Pain (4 - 6)          Vital Signs Last 24 Hrs  T(C): 36.7 (2017 11:00), Max: 37.1 (2017 05:50)  T(F): 98 (2017 11:00), Max: 98.8 (2017 05:50)  HR: 75 (2017 11:00) (68 - 81)  BP: 153/37 (2017 11:00) (153/37 - 156/40)  BP(mean): 69 (2017 05:50) (69 - 69)  RR: 18 (2017 11:00) (16 - 18)  SpO2: 97% (2017 11:00) (97% - 97%)  Daily     Daily Weight in k.2 (2017 06:00)     @ : @ 07:00  --------------------------------------------------------  IN: 200 mL / OUT: 780 mL / NET: -580 mL     @ 07: @ 11:26  --------------------------------------------------------  IN: 100 mL / OUT: 250 mL / NET: -150 mL        PHYSICAL EXAM:  Alert and appropriate  GENERAL: No distress  CHEST/LUNG: fair air entry  HEART: S1S2 RRR  ABDOMEN: distended  EXTREMITIES: Right AKA, Left LE trace edema  SKIN:     LABS:                        8.3    6.6   )-----------( 214      ( 2017 05:31 )             25.9     07-08    149<H>  |  109<H>  |  49<H>  ----------------------------<  94  3.4<L>   |  33<H>  |  1.54<H>    Ca    8.1<L>      2017 05:31                  RADIOLOGY & ADDITIONAL TESTS:

## 2017-07-08 NOTE — DISCHARGE NOTE ADULT - MEDICATION SUMMARY - MEDICATIONS TO CHANGE
I will SWITCH the dose or number of times a day I take the medications listed below when I get home from the hospital:    allopurinol 100 mg oral tablet  -- 1 tab(s) by mouth once a day    Ecotrin Adult Low Strength 81 mg oral delayed release tablet  -- 1 tab(s) by mouth once a day    budesonide 0.5 mg/2 mL inhalation suspension  -- 2 milliliter(s) inhaled 2 times a day, As Needed    diltiazem 120 mg/12 hours oral capsule, extended release  -- 1 cap(s) by mouth every 12 hours    fenofibrate 145 mg oral tablet  -- 1 tab(s) by mouth once a day    ferrous sulfate 325 mg (65 mg elemental iron) oral delayed release tablet  -- 1 tab(s) by mouth 3 times a day    Fish Oil 1000 mg oral capsule  -- 1 cap(s) by mouth 2 times a day    DuoNeb 0.5 mg-2.5 mg/3 mL inhalation solution  -- 3 milliliter(s) inhaled 4 times a day, As Needed    Vitamin D3 2000 intl units oral capsule  -- 1 cap(s) by mouth once a day    Vitamin B Compound Strong  -- 1 tab(s) by mouth once a day    Chondroitin-Glucosamine 200 mg-250 mg oral capsule  -- 1 cap(s) by mouth 2 times a day    isosorbide mononitrate 120 mg oral tablet, extended release  -- 1 tab(s) by mouth once a day (in the morning)    levocetirizine 5 mg oral tablet  -- 1 tab(s) by mouth once a day (in the evening)    pramipexole 0.125 mg oral tablet  -- 1 tab(s) by mouth 3 times a day    ranitidine 150 mg oral capsule  -- 1 cap(s) by mouth once a day (at bedtime)    simvastatin 10 mg oral tablet  -- 1 tab(s) by mouth once a day (at bedtime)    metoprolol tartrate 25 mg oral tablet  -- 0.5 tab(s) by mouth 2 times a day    gabapentin 300 mg oral capsule  -- 2 cap(s) by mouth once a day (at bedtime)    gabapentin 300 mg oral capsule  -- 1 tab(s) by mouth once a day (in the morning)    ammonium lactate topical cream  -- Apply on skin to affected area 2 times a day  -- to B/l upper xtremities    doxazosin 8 mg oral tablet  -- 1 tab(s) by mouth once a day (at bedtime)    hydrALAZINE  -- 150 milligram(s) by mouth 2 times a day    acetaminophen 325 mg oral tablet  -- 2 tab(s) by mouth every 6 hours, As needed, For Temp greater than 38 C (100.4 F)    aluminum hydroxide-magnesium hydroxide 200 mg-200 mg/5 mL oral suspension  -- 30 milliliter(s) by mouth every 6 hours, As needed, Dyspepsia    senna oral tablet  -- 2 tab(s) by mouth once a day (at bedtime)    simethicone 80 mg oral tablet, chewable  -- 1 tab(s) by mouth 2 times a day    fluticasone 50 mcg/inh nasal spray  -- 1 spray(s) into nose 2 times a day    pantoprazole 40 mg oral delayed release tablet  -- 1 tab(s) by mouth 2 times a day (before meals)    acetaminophen-oxycodone 325 mg-5 mg oral tablet  -- 1 tab(s) by mouth every 4 hours, As needed, Moderate Pain (4 - 6)    oxyCODONE 10 mg oral tablet  -- 1 tab(s) by mouth every 4 hours, As needed, Severe Pain (7 - 10)    nitroglycerin 0.4 mg sublingual tablet  -- 1 tab(s) under tongue every 5 minutes, As Needed for chest pain    furosemide 80 mg oral tablet  -- 1 tab(s) by mouth once a day    albuterol 90 mcg/inh inhalation aerosol  -- 2 puff(s) inhaled every 6 hours    Vitamin C 1000 mg oral tablet  -- 1 tab(s) by mouth once a day

## 2017-07-08 NOTE — PROGRESS NOTE ADULT - ASSESSMENT
Imp:  Diarrhea, which is most likely Abx/medication related, and well controlled with imodium    Rec:  OK for d/c by me from diarrhea perspective  Follow up outpatient with Dr. Hernández  Cont imodium prn

## 2017-07-08 NOTE — PROGRESS NOTE ADULT - ASSESSMENT
83 yo man with long hx of CKD and hypernatremia with very variable parameters.  Post right AKA    --CKD   Renal parameters stable  --Hypernatremia   stable with some variability  --Right AKA   continue  abtx and wound care.    7/6  stable creat   await dc     7/7 MK  - CKD stable renal parameters, change lasix to 40mg po QD  - hypokalemia: PO replacement in progress  - diarrhea: improving with cdiff negative  - hypernatremia: no ivf and continue with PO fluid replacement.  chronically in this range    7/8 SY  --CKD   stable renal parameters.  --Mild Hypernatremia.  Pt aware of need to increase water intake.  Continue current daily lasix.  --Replete K  --PVD  post Right AKA  to rehab  --GI.  Continued diarrhea but improved.  Continue to monitor fluid status.

## 2017-07-08 NOTE — DISCHARGE NOTE ADULT - SECONDARY DIAGNOSIS.
Coronary artery disease without angina pectoris, unspecified vessel or lesion type, unspecified whether native or transplanted heart Chronic obstructive pulmonary disease, unspecified COPD type CRI (chronic renal insufficiency), stage 2 (mild) Gastrointestinal hemorrhage, unspecified gastrointestinal hemorrhage type Sepsis, due to unspecified organism

## 2017-07-12 DIAGNOSIS — Z95.820 PERIPHERAL VASCULAR ANGIOPLASTY STATUS WITH IMPLANTS AND GRAFTS: ICD-10-CM

## 2017-07-12 DIAGNOSIS — Z87.891 PERSONAL HISTORY OF NICOTINE DEPENDENCE: ICD-10-CM

## 2017-07-12 DIAGNOSIS — L97.929 NON-PRESSURE CHRONIC ULCER OF UNSPECIFIED PART OF LEFT LOWER LEG WITH UNSPECIFIED SEVERITY: ICD-10-CM

## 2017-07-12 DIAGNOSIS — T81.4XXA INFECTION FOLLOWING A PROCEDURE, INITIAL ENCOUNTER: ICD-10-CM

## 2017-07-12 DIAGNOSIS — D64.9 ANEMIA, UNSPECIFIED: ICD-10-CM

## 2017-07-12 DIAGNOSIS — I73.9 PERIPHERAL VASCULAR DISEASE, UNSPECIFIED: ICD-10-CM

## 2017-07-12 DIAGNOSIS — B96.89 OTHER SPECIFIED BACTERIAL AGENTS AS THE CAUSE OF DISEASES CLASSIFIED ELSEWHERE: ICD-10-CM

## 2017-07-12 DIAGNOSIS — E87.0 HYPEROSMOLALITY AND HYPERNATREMIA: ICD-10-CM

## 2017-07-12 DIAGNOSIS — K92.2 GASTROINTESTINAL HEMORRHAGE, UNSPECIFIED: ICD-10-CM

## 2017-07-12 DIAGNOSIS — A08.8 OTHER SPECIFIED INTESTINAL INFECTIONS: ICD-10-CM

## 2017-07-12 DIAGNOSIS — I50.32 CHRONIC DIASTOLIC (CONGESTIVE) HEART FAILURE: ICD-10-CM

## 2017-07-12 DIAGNOSIS — Z95.5 PRESENCE OF CORONARY ANGIOPLASTY IMPLANT AND GRAFT: ICD-10-CM

## 2017-07-12 DIAGNOSIS — I25.10 ATHEROSCLEROTIC HEART DISEASE OF NATIVE CORONARY ARTERY WITHOUT ANGINA PECTORIS: ICD-10-CM

## 2017-07-12 DIAGNOSIS — L97.919 NON-PRESSURE CHRONIC ULCER OF UNSPECIFIED PART OF RIGHT LOWER LEG WITH UNSPECIFIED SEVERITY: ICD-10-CM

## 2017-07-12 DIAGNOSIS — G89.29 OTHER CHRONIC PAIN: ICD-10-CM

## 2017-07-12 DIAGNOSIS — E78.5 HYPERLIPIDEMIA, UNSPECIFIED: ICD-10-CM

## 2017-07-12 DIAGNOSIS — D63.8 ANEMIA IN OTHER CHRONIC DISEASES CLASSIFIED ELSEWHERE: ICD-10-CM

## 2017-07-12 DIAGNOSIS — L98.492 NON-PRESSURE CHRONIC ULCER OF SKIN OF OTHER SITES WITH FAT LAYER EXPOSED: ICD-10-CM

## 2017-07-12 DIAGNOSIS — M86.171 OTHER ACUTE OSTEOMYELITIS, RIGHT ANKLE AND FOOT: ICD-10-CM

## 2017-07-12 DIAGNOSIS — E78.00 PURE HYPERCHOLESTEROLEMIA, UNSPECIFIED: ICD-10-CM

## 2017-07-12 DIAGNOSIS — Z86.718 PERSONAL HISTORY OF OTHER VENOUS THROMBOSIS AND EMBOLISM: ICD-10-CM

## 2017-07-12 DIAGNOSIS — D50.9 IRON DEFICIENCY ANEMIA, UNSPECIFIED: ICD-10-CM

## 2017-07-12 DIAGNOSIS — I13.0 HYPERTENSIVE HEART AND CHRONIC KIDNEY DISEASE WITH HEART FAILURE AND STAGE 1 THROUGH STAGE 4 CHRONIC KIDNEY DISEASE, OR UNSPECIFIED CHRONIC KIDNEY DISEASE: ICD-10-CM

## 2017-07-12 DIAGNOSIS — G47.33 OBSTRUCTIVE SLEEP APNEA (ADULT) (PEDIATRIC): ICD-10-CM

## 2017-07-12 DIAGNOSIS — N18.3 CHRONIC KIDNEY DISEASE, STAGE 3 (MODERATE): ICD-10-CM

## 2017-07-12 DIAGNOSIS — J44.9 CHRONIC OBSTRUCTIVE PULMONARY DISEASE, UNSPECIFIED: ICD-10-CM

## 2017-07-12 DIAGNOSIS — M10.9 GOUT, UNSPECIFIED: ICD-10-CM

## 2017-07-12 DIAGNOSIS — A41.9 SEPSIS, UNSPECIFIED ORGANISM: ICD-10-CM

## 2017-07-12 DIAGNOSIS — N17.9 ACUTE KIDNEY FAILURE, UNSPECIFIED: ICD-10-CM

## 2017-07-12 DIAGNOSIS — I82.612 ACUTE EMBOLISM AND THROMBOSIS OF SUPERFICIAL VEINS OF LEFT UPPER EXTREMITY: ICD-10-CM

## 2017-07-12 DIAGNOSIS — Z99.81 DEPENDENCE ON SUPPLEMENTAL OXYGEN: ICD-10-CM

## 2017-07-13 DIAGNOSIS — L97.813 NON-PRESSURE CHRONIC ULCER OF OTHER PART OF RIGHT LOWER LEG WITH NECROSIS OF MUSCLE: ICD-10-CM

## 2017-07-13 DIAGNOSIS — I70.238 ATHEROSCLEROSIS OF NATIVE ARTERIES OF RIGHT LEG WITH ULCERATION OF OTHER PART OF LOWER LEG: ICD-10-CM

## 2017-07-13 DIAGNOSIS — K52.1 TOXIC GASTROENTERITIS AND COLITIS: ICD-10-CM

## 2017-07-13 DIAGNOSIS — L89.620 PRESSURE ULCER OF LEFT HEEL, UNSTAGEABLE: ICD-10-CM

## 2017-07-13 DIAGNOSIS — L97.829 NON-PRESSURE CHRONIC ULCER OF OTHER PART OF LEFT LOWER LEG WITH UNSPECIFIED SEVERITY: ICD-10-CM

## 2017-07-13 DIAGNOSIS — L97.919 NON-PRESSURE CHRONIC ULCER OF UNSPECIFIED PART OF RIGHT LOWER LEG WITH UNSPECIFIED SEVERITY: ICD-10-CM

## 2017-07-13 DIAGNOSIS — L97.519 NON-PRESSURE CHRONIC ULCER OF OTHER PART OF RIGHT FOOT WITH UNSPECIFIED SEVERITY: ICD-10-CM

## 2017-07-13 DIAGNOSIS — M86.671 OTHER CHRONIC OSTEOMYELITIS, RIGHT ANKLE AND FOOT: ICD-10-CM

## 2017-07-13 DIAGNOSIS — I96 GANGRENE, NOT ELSEWHERE CLASSIFIED: ICD-10-CM

## 2017-07-13 DIAGNOSIS — T81.31XA DISRUPTION OF EXTERNAL OPERATION (SURGICAL) WOUND, NOT ELSEWHERE CLASSIFIED, INITIAL ENCOUNTER: ICD-10-CM

## 2017-09-03 ENCOUNTER — INPATIENT (INPATIENT)
Facility: HOSPITAL | Age: 82
LOS: 10 days | Discharge: NURSING, REHAB OR LONG-TERM CARE | End: 2017-09-14
Attending: INTERNAL MEDICINE | Admitting: FAMILY MEDICINE
Payer: MEDICARE

## 2017-09-03 VITALS
WEIGHT: 171.08 LBS | HEART RATE: 100 BPM | OXYGEN SATURATION: 100 % | HEIGHT: 65 IN | SYSTOLIC BLOOD PRESSURE: 174 MMHG | DIASTOLIC BLOOD PRESSURE: 70 MMHG | TEMPERATURE: 99 F | RESPIRATION RATE: 16 BRPM

## 2017-09-03 DIAGNOSIS — A04.7 ENTEROCOLITIS DUE TO CLOSTRIDIUM DIFFICILE: ICD-10-CM

## 2017-09-03 DIAGNOSIS — Z95.9 PRESENCE OF CARDIAC AND VASCULAR IMPLANT AND GRAFT, UNSPECIFIED: Chronic | ICD-10-CM

## 2017-09-03 LAB
ALBUMIN SERPL ELPH-MCNC: 2.3 G/DL — LOW (ref 3.3–5)
ALP SERPL-CCNC: 45 U/L — SIGNIFICANT CHANGE UP (ref 40–120)
ALT FLD-CCNC: 8 U/L — LOW (ref 12–78)
ANION GAP SERPL CALC-SCNC: 7 MMOL/L — SIGNIFICANT CHANGE UP (ref 5–17)
APPEARANCE UR: CLEAR — SIGNIFICANT CHANGE UP
APTT BLD: 52.5 SEC — HIGH (ref 27.5–37.4)
AST SERPL-CCNC: 18 U/L — SIGNIFICANT CHANGE UP (ref 15–37)
BACTERIA # UR AUTO: (no result)
BASOPHILS # BLD AUTO: 0.1 K/UL — SIGNIFICANT CHANGE UP (ref 0–0.2)
BASOPHILS NFR BLD AUTO: 0.9 % — SIGNIFICANT CHANGE UP (ref 0–2)
BILIRUB SERPL-MCNC: 0.3 MG/DL — SIGNIFICANT CHANGE UP (ref 0.2–1.2)
BILIRUB UR-MCNC: NEGATIVE — SIGNIFICANT CHANGE UP
BLD GP AB SCN SERPL QL: SIGNIFICANT CHANGE UP
BUN SERPL-MCNC: 35 MG/DL — HIGH (ref 7–23)
CALCIUM SERPL-MCNC: 8.3 MG/DL — LOW (ref 8.5–10.1)
CHLORIDE SERPL-SCNC: 113 MMOL/L — HIGH (ref 96–108)
CO2 SERPL-SCNC: 28 MMOL/L — SIGNIFICANT CHANGE UP (ref 22–31)
COLOR SPEC: YELLOW — SIGNIFICANT CHANGE UP
COMMENT - URINE: SIGNIFICANT CHANGE UP
CREAT SERPL-MCNC: 1.76 MG/DL — HIGH (ref 0.5–1.3)
DIFF PNL FLD: NEGATIVE — SIGNIFICANT CHANGE UP
EOSINOPHIL # BLD AUTO: 0.6 K/UL — HIGH (ref 0–0.5)
EOSINOPHIL NFR BLD AUTO: 6.1 % — HIGH (ref 0–6)
EPI CELLS # UR: SIGNIFICANT CHANGE UP
GLUCOSE SERPL-MCNC: 92 MG/DL — SIGNIFICANT CHANGE UP (ref 70–99)
GLUCOSE UR QL: NEGATIVE MG/DL — SIGNIFICANT CHANGE UP
HCT VFR BLD CALC: 27.9 % — LOW (ref 39–50)
HGB BLD-MCNC: 9.1 G/DL — LOW (ref 13–17)
HYALINE CASTS # UR AUTO: (no result) /LPF
INR BLD: 4.17 RATIO — HIGH (ref 0.88–1.16)
KETONES UR-MCNC: NEGATIVE — SIGNIFICANT CHANGE UP
LACTATE SERPL-SCNC: 0.6 MMOL/L — LOW (ref 0.7–2)
LEUKOCYTE ESTERASE UR-ACNC: (no result)
LIDOCAIN IGE QN: 557 U/L — HIGH (ref 73–393)
LYMPHOCYTES # BLD AUTO: 0.9 K/UL — LOW (ref 1–3.3)
LYMPHOCYTES # BLD AUTO: 8.9 % — LOW (ref 13–44)
MCHC RBC-ENTMCNC: 31.7 PG — SIGNIFICANT CHANGE UP (ref 27–34)
MCHC RBC-ENTMCNC: 32.8 GM/DL — SIGNIFICANT CHANGE UP (ref 32–36)
MCV RBC AUTO: 96.8 FL — SIGNIFICANT CHANGE UP (ref 80–100)
MONOCYTES # BLD AUTO: 0.6 K/UL — SIGNIFICANT CHANGE UP (ref 0–0.9)
MONOCYTES NFR BLD AUTO: 5.5 % — SIGNIFICANT CHANGE UP (ref 2–14)
NEUTROPHILS # BLD AUTO: 8 K/UL — HIGH (ref 1.8–7.4)
NEUTROPHILS NFR BLD AUTO: 78.5 % — HIGH (ref 43–77)
NITRITE UR-MCNC: NEGATIVE — SIGNIFICANT CHANGE UP
PH UR: 5 — SIGNIFICANT CHANGE UP (ref 5–8)
PLATELET # BLD AUTO: 319 K/UL — SIGNIFICANT CHANGE UP (ref 150–400)
POTASSIUM SERPL-MCNC: 3.8 MMOL/L — SIGNIFICANT CHANGE UP (ref 3.5–5.3)
POTASSIUM SERPL-SCNC: 3.8 MMOL/L — SIGNIFICANT CHANGE UP (ref 3.5–5.3)
PROT SERPL-MCNC: 5.5 GM/DL — LOW (ref 6–8.3)
PROT UR-MCNC: 100 MG/DL
PROTHROM AB SERPL-ACNC: 46.4 SEC — HIGH (ref 9.8–12.7)
RBC # BLD: 2.89 M/UL — LOW (ref 4.2–5.8)
RBC # FLD: 16.1 % — HIGH (ref 10.3–14.5)
RBC CASTS # UR COMP ASSIST: SIGNIFICANT CHANGE UP /HPF (ref 0–4)
SODIUM SERPL-SCNC: 148 MMOL/L — HIGH (ref 135–145)
SP GR SPEC: 1.01 — SIGNIFICANT CHANGE UP (ref 1.01–1.02)
TYPE + AB SCN PNL BLD: SIGNIFICANT CHANGE UP
UROBILINOGEN FLD QL: NEGATIVE MG/DL — SIGNIFICANT CHANGE UP
WBC # BLD: 10.2 K/UL — SIGNIFICANT CHANGE UP (ref 3.8–10.5)
WBC # FLD AUTO: 10.2 K/UL — SIGNIFICANT CHANGE UP (ref 3.8–10.5)
WBC UR QL: (no result)

## 2017-09-03 PROCEDURE — 71010: CPT | Mod: 26

## 2017-09-03 PROCEDURE — 99285 EMERGENCY DEPT VISIT HI MDM: CPT

## 2017-09-03 PROCEDURE — 93010 ELECTROCARDIOGRAM REPORT: CPT

## 2017-09-03 RX ORDER — HYDRALAZINE HCL 50 MG
50 TABLET ORAL DAILY
Qty: 0 | Refills: 0 | Status: DISCONTINUED | OUTPATIENT
Start: 2017-09-03 | End: 2017-09-05

## 2017-09-03 RX ORDER — FERROUS SULFATE 325(65) MG
325 TABLET ORAL DAILY
Qty: 0 | Refills: 0 | Status: DISCONTINUED | OUTPATIENT
Start: 2017-09-03 | End: 2017-09-14

## 2017-09-03 RX ORDER — LOPERAMIDE HCL 2 MG
2 TABLET ORAL EVERY 12 HOURS
Qty: 0 | Refills: 0 | Status: DISCONTINUED | OUTPATIENT
Start: 2017-09-03 | End: 2017-09-06

## 2017-09-03 RX ORDER — BUDESONIDE, MICRONIZED 100 %
0.5 POWDER (GRAM) MISCELLANEOUS EVERY 12 HOURS
Qty: 0 | Refills: 0 | Status: DISCONTINUED | OUTPATIENT
Start: 2017-09-03 | End: 2017-09-14

## 2017-09-03 RX ORDER — AMLODIPINE BESYLATE 2.5 MG/1
5 TABLET ORAL DAILY
Qty: 0 | Refills: 0 | Status: DISCONTINUED | OUTPATIENT
Start: 2017-09-03 | End: 2017-09-05

## 2017-09-03 RX ORDER — METOPROLOL TARTRATE 50 MG
25 TABLET ORAL
Qty: 0 | Refills: 0 | Status: DISCONTINUED | OUTPATIENT
Start: 2017-09-03 | End: 2017-09-14

## 2017-09-03 RX ORDER — ASCORBIC ACID 60 MG
1 TABLET,CHEWABLE ORAL
Qty: 0 | Refills: 0 | COMMUNITY

## 2017-09-03 RX ORDER — ISOSORBIDE MONONITRATE 60 MG/1
120 TABLET, EXTENDED RELEASE ORAL DAILY
Qty: 0 | Refills: 0 | Status: DISCONTINUED | OUTPATIENT
Start: 2017-09-03 | End: 2017-09-14

## 2017-09-03 RX ORDER — GABAPENTIN 400 MG/1
300 CAPSULE ORAL DAILY
Qty: 0 | Refills: 0 | Status: DISCONTINUED | OUTPATIENT
Start: 2017-09-03 | End: 2017-09-14

## 2017-09-03 RX ORDER — PRAMIPEXOLE DIHYDROCHLORIDE 0.12 MG/1
0.12 TABLET ORAL DAILY
Qty: 0 | Refills: 0 | Status: DISCONTINUED | OUTPATIENT
Start: 2017-09-03 | End: 2017-09-14

## 2017-09-03 RX ORDER — SODIUM CHLORIDE 9 MG/ML
1000 INJECTION INTRAMUSCULAR; INTRAVENOUS; SUBCUTANEOUS ONCE
Qty: 0 | Refills: 0 | Status: COMPLETED | OUTPATIENT
Start: 2017-09-03 | End: 2017-09-03

## 2017-09-03 RX ORDER — LACTOBACILLUS ACIDOPHILUS 100MM CELL
1 CAPSULE ORAL
Qty: 0 | Refills: 0 | Status: DISCONTINUED | OUTPATIENT
Start: 2017-09-03 | End: 2017-09-14

## 2017-09-03 RX ORDER — FENOFIBRATE,MICRONIZED 130 MG
145 CAPSULE ORAL DAILY
Qty: 0 | Refills: 0 | Status: DISCONTINUED | OUTPATIENT
Start: 2017-09-03 | End: 2017-09-05

## 2017-09-03 RX ORDER — ACETAMINOPHEN 500 MG
500 TABLET ORAL EVERY 4 HOURS
Qty: 0 | Refills: 0 | Status: DISCONTINUED | OUTPATIENT
Start: 2017-09-03 | End: 2017-09-14

## 2017-09-03 RX ORDER — SODIUM CHLORIDE 9 MG/ML
3 INJECTION INTRAMUSCULAR; INTRAVENOUS; SUBCUTANEOUS ONCE
Qty: 0 | Refills: 0 | Status: COMPLETED | OUTPATIENT
Start: 2017-09-03 | End: 2017-09-03

## 2017-09-03 RX ORDER — ALLOPURINOL 300 MG
100 TABLET ORAL DAILY
Qty: 0 | Refills: 0 | Status: DISCONTINUED | OUTPATIENT
Start: 2017-09-03 | End: 2017-09-14

## 2017-09-03 RX ORDER — SIMVASTATIN 20 MG/1
10 TABLET, FILM COATED ORAL AT BEDTIME
Qty: 0 | Refills: 0 | Status: DISCONTINUED | OUTPATIENT
Start: 2017-09-03 | End: 2017-09-14

## 2017-09-03 RX ORDER — DOXAZOSIN MESYLATE 4 MG
8 TABLET ORAL AT BEDTIME
Qty: 0 | Refills: 0 | Status: DISCONTINUED | OUTPATIENT
Start: 2017-09-03 | End: 2017-09-14

## 2017-09-03 RX ORDER — HYDROMORPHONE HYDROCHLORIDE 2 MG/ML
2 INJECTION INTRAMUSCULAR; INTRAVENOUS; SUBCUTANEOUS THREE TIMES A DAY
Qty: 0 | Refills: 0 | Status: DISCONTINUED | OUTPATIENT
Start: 2017-09-03 | End: 2017-09-03

## 2017-09-03 RX ORDER — IPRATROPIUM/ALBUTEROL SULFATE 18-103MCG
3 AEROSOL WITH ADAPTER (GRAM) INHALATION EVERY 4 HOURS
Qty: 0 | Refills: 0 | Status: DISCONTINUED | OUTPATIENT
Start: 2017-09-03 | End: 2017-09-14

## 2017-09-03 RX ORDER — PANTOPRAZOLE SODIUM 20 MG/1
40 TABLET, DELAYED RELEASE ORAL
Qty: 0 | Refills: 0 | Status: DISCONTINUED | OUTPATIENT
Start: 2017-09-03 | End: 2017-09-07

## 2017-09-03 RX ORDER — SODIUM CHLORIDE 9 MG/ML
1000 INJECTION, SOLUTION INTRAVENOUS
Qty: 0 | Refills: 0 | Status: DISCONTINUED | OUTPATIENT
Start: 2017-09-03 | End: 2017-09-04

## 2017-09-03 RX ADMIN — SODIUM CHLORIDE 3 MILLILITER(S): 9 INJECTION INTRAMUSCULAR; INTRAVENOUS; SUBCUTANEOUS at 20:02

## 2017-09-03 NOTE — H&P ADULT - HISTORY OF PRESENT ILLNESS
82 year old male with history of CAD s/p stents (LAD, RCA bare metal around 9/16),PVD (RLE stent/ angioplasty and surgical debridment by Dr Siu 5/20 ) A.Fib not on anticoagulation due to GI bleeding, Hypertension, COPD on home O2 2L, SHAYY on nocturnal BIPAP, ex-smoker (smoked 1ppd X 50 years, quit 22 years ago),  Chronic diastolic CHF, Hyperlipidemia, PVD, Iron deficiency anemia, Chronic back pain, Gout, BPH, CKD III with baseline Cr 2. Was ecently admitted to  on  4/17 with anemia of GI bleeding, RLE and RUE DVTs,( received pRBCs and IVC filter discharged to rehab with aspirin) was readmitted to 82 year old male with history of CAD s/p stents (LAD, RCA bare metal around 9/16),PVD (RLE stent/ angioplasty and surgical debridment by Dr Siu 5/20,right lower extremity amputation ) A.Fib  on coumadin, Hypertension, COPD on home O2 2L, SHAYY onhistory of  nocturnal BIPAP, ex-smoker (smoked 1ppd X 50 years, quit 22 years ago),  Chronic diastolic CHF, Hyperlipidemia, PVD, Iron deficiency anemia, Chronic back pain, Gout, BPH, CKD III . 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  Per ED physician notes stool guaic positive ,on rectal exam done by ED physician there was some perianal skin tear and brown stools ,no gross blood in stools  INR in ED was 4   no active bleeding in ED ,no vitamin K given at this time  CXR no changes from 6/29/2017,no infiltrate or pleural effusion or pulmonary vascular congestion  patient is afebrile and asymptomatic in ED  reports chronic diarrhea 10 times a day for which he takes loperamide prn and lactobacillus    Pmhx- see HPI  Pshx right rotator cuff repair, pilonydal cyst, RLE stent/angioplasty/surgical debridement,amputation,IVC filter  Family hx- rectal ca,HTN,DM  social hx- exsmoker quit 22 years ago,rare etoh use     ROS- patient denies any CP,SOB,abdominal pain,cough,fever,nausea,vomiting,dizziness,palpitations

## 2017-09-03 NOTE — H&P ADULT - NSHPPHYSICALEXAM_GEN_ALL_CORE
PHYSICAL EXAM:    Daily Height in cm: 165.1 (03 Sep 2017 17:38)    Daily     ICU Vital Signs Last 24 Hrs  T(C): 36.7 (04 Sep 2017 00:29), Max: 37.3 (03 Sep 2017 17:38)  T(F): 98 (04 Sep 2017 00:29), Max: 99.1 (03 Sep 2017 17:38)  HR: 79 (04 Sep 2017 00:29) (71 - 100)  BP: 158/47 (04 Sep 2017 00:29) (158/47 - 174/70)  BP(mean): --  ABP: --  ABP(mean): --  RR: 20 (04 Sep 2017 00:29) (16 - 20)  SpO2: 98% (04 Sep 2017 00:29) (97% - 100%)      Constitutional: NAD ,on home O2  HEENT: Atraumatic, DEBBIE, Normal, No congestion  Respiratory: Breath Sounds normal, no rhonchi/wheeze  Cardiovascular: N S1S2; BEN present  Gastrointestinal: Abdomen soft, non tender, Bowel Ssounds present  Extremities: chronic LLE edema 1+, RLE amputation  Neurological: AAO x 3, no gross focal motor deficits  Skin: Non cellulitic,     Back: No CVA tenderness   Musculoskeletal: non tender  Breasts: Deferred  Genitourinary: deferred  Rectal: Deferred

## 2017-09-03 NOTE — ED ADULT NURSE NOTE - NURSING SKIN WOUND TYPE #1
irritation and excoriation noted to rectum and scrotal area with multiple breaks in the pt's skin , barrier cream applied

## 2017-09-03 NOTE — ED PROVIDER NOTE - OBJECTIVE STATEMENT
83 y/o male with a PMHx of CAD with stent, peripheral vascular disease, AFib not on coagulations secondary to GI bleeding, HTN, HLD, COPD on home o2, SHAYY nocturnal BiPAP,  Diastolic CHF, Anemia, CKD 3, presents to the ED c/o rectal bleeding; bright red blood in the diaper, on coumadin due to blood clot. INR Wednesday 2.6.  transfused 20 pints of blood and located bleeding in the stomach, clamped and cauterized. Pt hasn't been at home for 5 months due to hospitalization and rehab. Medication: Coumadin 81 y/o male with a PMHx of CAD with stent, peripheral vascular disease, AFib, upperGIB, HTN, HLD, COPD on home o2, SHAYY nocturnal BiPAP,  Diastolic CHF, Anemia, CKD 3, presents to the ED c/o rectal bleeding; bright red blood in the diaper, on coumadin due to blood clot. INR Wednesday 2.6.  transfused 20 pints of blood and located bleeding in the stomach, clamped and cauterized. Pt hasn't been at home for 5 months due to hospitalization and rehab. Medication: Coumadin

## 2017-09-03 NOTE — ED ADULT NURSE REASSESSMENT NOTE - NS ED NURSE REASSESS COMMENT FT1
patient resting in bed. alert and oriented. denies pain or discomfort other than during diaper change, patient c/o pain to underside of scrotum. diaper changed at 2300 for small bowel movement, no blood seen in stool. iv fluids infusing. awaiting hospitalist admission orders. will continue to monitor.

## 2017-09-03 NOTE — ED ADULT NURSE NOTE - OBJECTIVE STATEMENT
pt was being changed by the aides at [facility] , bright red blood noted . upon examination several scratches , Guiac negative, pt has a blood clot in her LEFT arm on coumadin, hx GI bleed

## 2017-09-03 NOTE — ED PROVIDER NOTE - MUSCULOSKELETAL, MLM
Spine appears normal, range of motion is not limited, no muscle or joint tenderness. S/p right leg amputation.

## 2017-09-03 NOTE — H&P ADULT - ASSESSMENT
82 year old male with history of CAD s/p stents (LAD, RCA bare metal around 9/16),PVD (RLE stent/ angioplasty and surgical debridment by Dr Siu 5/20,right lower extremity amputation ) A.Fib  on coumadin, Hypertension, COPD on home O2 2L, SHAYY onhistory of  nocturnal BIPAP, ex-smoker (smoked 1ppd X 50 years, quit 22 years ago),  Chronic diastolic CHF, Hyperlipidemia, PVD, Iron deficiency anemia, Chronic back pain, Gout, BPH, CKD III . 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,sent from NH for minimal red blood stain on diaper    A/P  # Minimal Blood stain on diaper -Possible from perianal superficial skin tear /R/O GI bleed- on coumadin with supratherapeutic INR  # No active episodes of GI bleed in ED  #Occult stool guaiac positive   #Supratherapeutic INR on coumadin    Admit to med surg  - Serial H/H q6 hrs   -hold coumadin for now,monitor INR,hold asa for now ,  -defer Vitamin k for now as no episodes of active GI bleed in ED and INR 4  -if any  episodes of active  GI bleed would consider IV vit K and PCC4 to reverse coumadin  - no need for PLT transfusion at this time ,hold asa for now  -Gi consult      #Hemoconcentration /Possible mild ARYA on CKD/Dehydration/chronic hypernatremia  - Slow IV hydration for 7 hrs and check BMP    #hx of Afib/CAD /stents, Peripheral arterial disease, DVT/IVC filter, Chronic diastolic heart failure  stable  - would need to resume A/C and antiplatelets ASAP if no epsiodes of GI bleed in patient due to risk of thromboembolism and hx of CAD/stent and hx of PAD  - cardio consult    #DVT prophylaxis - no AC for now   IPC 82 year old male with history of CAD s/p stents (LAD, RCA bare metal around 9/16),PVD (RLE stent/ angioplasty and surgical debridment by Dr Siu 5/20,right lower extremity amputation ) A.Fib  on coumadin, Hypertension, COPD on home O2 2L, SHAYY onhistory of  nocturnal BIPAP, ex-smoker (smoked 1ppd X 50 years, quit 22 years ago),  Chronic diastolic CHF, Hyperlipidemia, PVD, Iron deficiency anemia, Chronic back pain, Gout, BPH, CKD III . 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,sent from NH for minimal red blood stain on diaper    A/P  # Minimal Blood stain on diaper -Possible from perianal superficial skin tear  Vs /R/O GI bleed- on coumadin with supratherapeutic INR  # No active episodes of GI bleed in ED  #Occult stool guaiac positive   #Supratherapeutic INR on coumadin    Admit to med surg  - Serial H/H q6 hrs   -hold coumadin for now,monitor INR,hold asa for now ,  -defer Vitamin k for now as no episodes of active GI bleed in ED and INR 4  -if any  episodes of active  GI bleed would consider IV vit K and PCC4 to reverse coumadin  - no need for PLT transfusion at this time ,hold asa for now  -Gi consult      #Hemoconcentration /Possible mild ARYA on CKD/Dehydration/chronic hypernatremia  - Slow IV hydration for 7 hrs and check BMP    #hx of Afib/CAD /stents, Peripheral arterial disease, DVT/IVC filter, Chronic diastolic heart failure  stable  - would need to resume A/C and antiplatelets ASAP if no epsiodes of GI bleed in patient due to risk of thromboembolism and hx of CAD/stent and hx of PAD  - cardio consult    #DVT prophylaxis - no AC for now   IPC

## 2017-09-03 NOTE — H&P ADULT - NSHPLABSRESULTS_GEN_ALL_CORE
9.1    10.2  )-----------( 319      ( 03 Sep 2017 18:32 )             27.9       CBC Full  -  ( 03 Sep 2017 18:32 )  WBC Count : 10.2 K/uL  Hemoglobin : 9.1 g/dL  Hematocrit : 27.9 %  Platelet Count - Automated : 319 K/uL  Mean Cell Volume : 96.8 fl  Mean Cell Hemoglobin : 31.7 pg  Mean Cell Hemoglobin Concentration : 32.8 gm/dL  Auto Neutrophil # : 8.0 K/uL  Auto Lymphocyte # : 0.9 K/uL  Auto Monocyte # : 0.6 K/uL  Auto Eosinophil # : 0.6 K/uL  Auto Basophil # : 0.1 K/uL  Auto Neutrophil % : 78.5 %  Auto Lymphocyte % : 8.9 %  Auto Monocyte % : 5.5 %  Auto Eosinophil % : 6.1 %  Auto Basophil % : 0.9 %          148<H>  |  113<H>  |  35<H>  ----------------------------<  92  3.8   |  28  |  1.76<H>    Ca    8.3<L>      03 Sep 2017 18:32    TPro  5.5<L>  /  Alb  2.3<L>  /  TBili  0.3  /  DBili  x   /  AST  18  /  ALT  8<L>  /  AlkPhos  45  09-03      LIVER FUNCTIONS - ( 03 Sep 2017 18:32 )  Alb: 2.3 g/dL / Pro: 5.5 gm/dL / ALK PHOS: 45 U/L / ALT: 8 U/L / AST: 18 U/L / GGT: x             PT/INR - ( 03 Sep 2017 18:32 )   PT: 46.4 sec;   INR: 4.17 ratio         PTT - ( 03 Sep 2017 18:32 )  PTT:52.5 sec          Urinalysis Basic - ( 03 Sep 2017 20:28 )    Color: Yellow / Appearance: Clear / S.015 / pH: x  Gluc: x / Ketone: Negative  / Bili: Negative / Urobili: Negative mg/dL   Blood: x / Protein: 100 mg/dL / Nitrite: Negative   Leuk Esterase: Trace / RBC: 0-2 /HPF / WBC 6-10   Sq Epi: x / Non Sq Epi: Few / Bacteria: Moderate

## 2017-09-03 NOTE — ED PROVIDER NOTE - PROGRESS NOTE DETAILS
Attending Carlin: Rectal exam showed minor skin break, likely mild bed sore, light brown stool with punctate guaiac positive.   Lot 103, exp 02/28/18, QC ok.  H/H above baseline however INR elevated and report of blood in diaper was more than expected from small skin tear. Will admit for further obs

## 2017-09-03 NOTE — ED PROVIDER NOTE - PMH
Afib    Anemia    Atelectasis    Benign prostatic hypertrophy    CAD (coronary artery disease)    CKD (chronic kidney disease)    COPD (chronic obstructive pulmonary disease)    CRI (chronic renal insufficiency)    Diastolic CHF    Dyspepsia  On moderate exertion.  GERD (gastroesophageal reflux disease)    Gout    Hypercholesterolemia    Hypertension    SHAYY (obstructive sleep apnea)    Peripheral edema    Peripheral vascular disease    Pleural effusion, bilateral    Respiratory failure    Sepsis, due to unspecified organism  2/2 poorly healing wounds b/l  Sleep apnea, obstructive  Requires home 02 therapy, and treatment with BIPAP  Spinal stenosis

## 2017-09-04 LAB
ALBUMIN SERPL ELPH-MCNC: 2.1 G/DL — LOW (ref 3.3–5)
ALP SERPL-CCNC: 42 U/L — SIGNIFICANT CHANGE UP (ref 40–120)
ALT FLD-CCNC: 8 U/L — LOW (ref 12–78)
ANION GAP SERPL CALC-SCNC: 6 MMOL/L — SIGNIFICANT CHANGE UP (ref 5–17)
ANION GAP SERPL CALC-SCNC: 8 MMOL/L — SIGNIFICANT CHANGE UP (ref 5–17)
APTT BLD: 52.8 SEC — HIGH (ref 27.5–37.4)
AST SERPL-CCNC: 18 U/L — SIGNIFICANT CHANGE UP (ref 15–37)
BASOPHILS # BLD AUTO: 0.1 K/UL — SIGNIFICANT CHANGE UP (ref 0–0.2)
BASOPHILS NFR BLD AUTO: 1.2 % — SIGNIFICANT CHANGE UP (ref 0–2)
BILIRUB SERPL-MCNC: 0.2 MG/DL — SIGNIFICANT CHANGE UP (ref 0.2–1.2)
BUN SERPL-MCNC: 33 MG/DL — HIGH (ref 7–23)
BUN SERPL-MCNC: 35 MG/DL — HIGH (ref 7–23)
CALCIUM SERPL-MCNC: 7.7 MG/DL — LOW (ref 8.5–10.1)
CALCIUM SERPL-MCNC: 8.2 MG/DL — LOW (ref 8.5–10.1)
CHLORIDE SERPL-SCNC: 114 MMOL/L — HIGH (ref 96–108)
CHLORIDE SERPL-SCNC: 115 MMOL/L — HIGH (ref 96–108)
CO2 SERPL-SCNC: 26 MMOL/L — SIGNIFICANT CHANGE UP (ref 22–31)
CO2 SERPL-SCNC: 27 MMOL/L — SIGNIFICANT CHANGE UP (ref 22–31)
CREAT SERPL-MCNC: 1.49 MG/DL — HIGH (ref 0.5–1.3)
CREAT SERPL-MCNC: 1.6 MG/DL — HIGH (ref 0.5–1.3)
CULTURE RESULTS: NO GROWTH — SIGNIFICANT CHANGE UP
EOSINOPHIL # BLD AUTO: 0.6 K/UL — HIGH (ref 0–0.5)
EOSINOPHIL NFR BLD AUTO: 7.4 % — HIGH (ref 0–6)
GLUCOSE SERPL-MCNC: 116 MG/DL — HIGH (ref 70–99)
GLUCOSE SERPL-MCNC: 88 MG/DL — SIGNIFICANT CHANGE UP (ref 70–99)
HCT VFR BLD CALC: 25.4 % — LOW (ref 39–50)
HCT VFR BLD CALC: 27.1 % — LOW (ref 39–50)
HGB BLD-MCNC: 8.1 G/DL — LOW (ref 13–17)
HGB BLD-MCNC: 8.5 G/DL — LOW (ref 13–17)
INR BLD: 4.06 RATIO — HIGH (ref 0.88–1.16)
LYMPHOCYTES # BLD AUTO: 0.9 K/UL — LOW (ref 1–3.3)
LYMPHOCYTES # BLD AUTO: 10.2 % — LOW (ref 13–44)
MAGNESIUM SERPL-MCNC: 1.9 MG/DL — SIGNIFICANT CHANGE UP (ref 1.6–2.6)
MCHC RBC-ENTMCNC: 30.1 PG — SIGNIFICANT CHANGE UP (ref 27–34)
MCHC RBC-ENTMCNC: 31.3 GM/DL — LOW (ref 32–36)
MCV RBC AUTO: 96.2 FL — SIGNIFICANT CHANGE UP (ref 80–100)
MONOCYTES # BLD AUTO: 0.4 K/UL — SIGNIFICANT CHANGE UP (ref 0–0.9)
MONOCYTES NFR BLD AUTO: 4.8 % — SIGNIFICANT CHANGE UP (ref 2–14)
NEUTROPHILS # BLD AUTO: 6.6 K/UL — SIGNIFICANT CHANGE UP (ref 1.8–7.4)
NEUTROPHILS NFR BLD AUTO: 76.4 % — SIGNIFICANT CHANGE UP (ref 43–77)
PHOSPHATE SERPL-MCNC: 2.9 MG/DL — SIGNIFICANT CHANGE UP (ref 2.5–4.5)
PLATELET # BLD AUTO: 287 K/UL — SIGNIFICANT CHANGE UP (ref 150–400)
POTASSIUM SERPL-MCNC: 3.4 MMOL/L — LOW (ref 3.5–5.3)
POTASSIUM SERPL-MCNC: 3.5 MMOL/L — SIGNIFICANT CHANGE UP (ref 3.5–5.3)
POTASSIUM SERPL-SCNC: 3.4 MMOL/L — LOW (ref 3.5–5.3)
POTASSIUM SERPL-SCNC: 3.5 MMOL/L — SIGNIFICANT CHANGE UP (ref 3.5–5.3)
PROT SERPL-MCNC: 5.1 GM/DL — LOW (ref 6–8.3)
PROTHROM AB SERPL-ACNC: 45.1 SEC — HIGH (ref 9.8–12.7)
RBC # BLD: 2.82 M/UL — LOW (ref 4.2–5.8)
RBC # FLD: 15.9 % — HIGH (ref 10.3–14.5)
SODIUM SERPL-SCNC: 148 MMOL/L — HIGH (ref 135–145)
SODIUM SERPL-SCNC: 148 MMOL/L — HIGH (ref 135–145)
SPECIMEN SOURCE: SIGNIFICANT CHANGE UP
WBC # BLD: 8.7 K/UL — SIGNIFICANT CHANGE UP (ref 3.8–10.5)
WBC # FLD AUTO: 8.7 K/UL — SIGNIFICANT CHANGE UP (ref 3.8–10.5)

## 2017-09-04 RX ORDER — ASPIRIN/CALCIUM CARB/MAGNESIUM 324 MG
81 TABLET ORAL DAILY
Qty: 0 | Refills: 0 | Status: DISCONTINUED | OUTPATIENT
Start: 2017-09-04 | End: 2017-09-07

## 2017-09-04 RX ORDER — INFLUENZA VIRUS VACCINE 15; 15; 15; 15 UG/.5ML; UG/.5ML; UG/.5ML; UG/.5ML
0.5 SUSPENSION INTRAMUSCULAR ONCE
Qty: 0 | Refills: 0 | Status: DISCONTINUED | OUTPATIENT
Start: 2017-09-04 | End: 2017-09-04

## 2017-09-04 RX ORDER — POTASSIUM CHLORIDE 20 MEQ
40 PACKET (EA) ORAL ONCE
Qty: 0 | Refills: 0 | Status: COMPLETED | OUTPATIENT
Start: 2017-09-04 | End: 2017-09-04

## 2017-09-04 RX ORDER — DEXTROSE MONOHYDRATE, SODIUM CHLORIDE, AND POTASSIUM CHLORIDE 50; .745; 4.5 G/1000ML; G/1000ML; G/1000ML
1000 INJECTION, SOLUTION INTRAVENOUS
Qty: 0 | Refills: 0 | Status: DISCONTINUED | OUTPATIENT
Start: 2017-09-04 | End: 2017-09-05

## 2017-09-04 RX ORDER — INFLUENZA VIRUS VACCINE 15; 15; 15; 15 UG/.5ML; UG/.5ML; UG/.5ML; UG/.5ML
0.5 SUSPENSION INTRAMUSCULAR ONCE
Qty: 0 | Refills: 0 | Status: COMPLETED | OUTPATIENT
Start: 2017-09-04 | End: 2017-09-07

## 2017-09-04 RX ADMIN — DEXTROSE MONOHYDRATE, SODIUM CHLORIDE, AND POTASSIUM CHLORIDE 50 MILLILITER(S): 50; .745; 4.5 INJECTION, SOLUTION INTRAVENOUS at 17:04

## 2017-09-04 RX ADMIN — Medication 81 MILLIGRAM(S): at 17:04

## 2017-09-04 RX ADMIN — Medication 40 MILLIEQUIVALENT(S): at 17:05

## 2017-09-04 RX ADMIN — Medication 25 MILLIGRAM(S): at 00:20

## 2017-09-04 RX ADMIN — SODIUM CHLORIDE 100 MILLILITER(S): 9 INJECTION, SOLUTION INTRAVENOUS at 01:30

## 2017-09-04 RX ADMIN — Medication 145 MILLIGRAM(S): at 12:23

## 2017-09-04 RX ADMIN — AMLODIPINE BESYLATE 5 MILLIGRAM(S): 2.5 TABLET ORAL at 05:30

## 2017-09-04 RX ADMIN — Medication 50 MILLIGRAM(S): at 05:30

## 2017-09-04 RX ADMIN — Medication 0.5 MILLIGRAM(S): at 19:58

## 2017-09-04 RX ADMIN — GABAPENTIN 300 MILLIGRAM(S): 400 CAPSULE ORAL at 12:23

## 2017-09-04 RX ADMIN — Medication 0.5 MILLIGRAM(S): at 08:04

## 2017-09-04 RX ADMIN — Medication 1 TABLET(S): at 17:05

## 2017-09-04 RX ADMIN — PANTOPRAZOLE SODIUM 40 MILLIGRAM(S): 20 TABLET, DELAYED RELEASE ORAL at 08:57

## 2017-09-04 RX ADMIN — ISOSORBIDE MONONITRATE 120 MILLIGRAM(S): 60 TABLET, EXTENDED RELEASE ORAL at 12:23

## 2017-09-04 RX ADMIN — Medication 25 MILLIGRAM(S): at 17:05

## 2017-09-04 RX ADMIN — PRAMIPEXOLE DIHYDROCHLORIDE 0.12 MILLIGRAM(S): 0.12 TABLET ORAL at 12:23

## 2017-09-04 RX ADMIN — SIMVASTATIN 10 MILLIGRAM(S): 20 TABLET, FILM COATED ORAL at 21:47

## 2017-09-04 RX ADMIN — Medication 100 MILLIGRAM(S): at 12:23

## 2017-09-04 RX ADMIN — Medication 325 MILLIGRAM(S): at 12:23

## 2017-09-04 RX ADMIN — Medication 8 MILLIGRAM(S): at 21:46

## 2017-09-04 RX ADMIN — Medication 8 MILLIGRAM(S): at 00:37

## 2017-09-04 NOTE — PROGRESS NOTE ADULT - SUBJECTIVE AND OBJECTIVE BOX
Subjective:  Patient is a 82y old  Male who presents with a chief complaint of bright blood at rectal area    HPI:         82 year old male with history of CAD s/p stents (LAD, RCA bare metal around ),PVD (RLE stent/ angioplasty and surgical debridement by Dr Siu ,right lower extremity amputation ) A.Fib  on coumadin, Hypertension, COPD on home O2 2L, SHAYY on history of  nocturnal BIPAP, ex-smoker (smoked 1ppd X 50 years, quit 22 years ago),  Chronic diastolic CHF, Hyperlipidemia, PVD, Iron deficiency anemia, Chronic back pain, Gout, BPH, CKD III . 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 Patient has been in rehab now for 5 months   In ED - no gross blood in stools, no cookie ,  ED physician notes stool guaic positive ,on rectal exam done by ED physician there was some perianal skin tear and brown stools , INR  was 4 , reports chronic diarrhea 10 times a day for which he takes loperamide prn and lactobacillus    17 - Patient seen and examined at bedside earlier today, no rectal bleeding, denies CP, palpitations,, dyspnea, abd pain    Review of system- Rest of the review of system are negative except mentioned in HPI    OBJECTIVE:   T(C): 36.8 (17 @ 11:34), Max: 37.3 (17 @ 17:38)  HR: 75 (17 @ 11:34) (66 - 100)  BP: 152/29 (17 @ 11:34) (152/29 - 174/70)  RR: 20 (17 @ 11:34) (16 - 20)  SpO2: 92% (17 @ 11:34) (92% - 100%)  Wt(kg): --  Daily Height in cm: 165.1 (03 Sep 2017 17:38)    Daily Weight in k.5 (04 Sep 2017 01:34)    PHYSICAL EXAM:  GENERAL: NAD  NERVOUS SYSTEM:  Alert & Oriented X3, non- focal exam  HEAD:  Atraumatic, Normocephalic  EYES: EOMI, PERRLA, conjunctiva and sclera clear  HEENT: Moist mucous membranes  NECK: Supple, No JVD  CHEST/LUNG: Clear to auscultation bilaterally; No rales, no rhonchi, no wheezing, or rubs  HEART: Regular rate and rhythm; No murmurs, rubs, or gallops  ABDOMEN: Soft, Nontender, Nondistended; Bowel sounds present  GENITOURINARY- Voiding, no suprapubic tenderness  EXTREMITIES:  - chronic LLE edema 1+, RLE amputation  MUSCULOSKELETAL: No muscle tenderness, Muscle tone normal, No joint tenderness, no Joint swelling, Joint range of motion-normal  SKIN-no rash, no lesion    LABS:                        8.5    8.7   )-----------( 287      ( 04 Sep 2017 07:29 )             27.1     09-04    148<H>  |  115<H>  |  33<H>  ----------------------------<  88  3.4<L>   |  27  |  1.49<H>    Ca    8.2<L>      04 Sep 2017 07:29  Phos  2.9     09-  Mg     1.9     09-04    TPro  5.1<L>  /  Alb  2.1<L>  /  TBili  0.2  /  DBili  x   /  AST  18  /  ALT  8<L>  /  AlkPhos  42  09-04    PT/INR - ( 04 Sep 2017 07:29 )   PT: 45.1 sec;   INR: 4.06 ratio         PTT - ( 04 Sep 2017 07:29 )  PTT:52.8 sec      Urinalysis Basic - ( 03 Sep 2017 20:28 )    Color: Yellow / Appearance: Clear / S.015 / pH: x  Gluc: x / Ketone: Negative  / Bili: Negative / Urobili: Negative mg/dL   Blood: x / Protein: 100 mg/dL / Nitrite: Negative   Leuk Esterase: Trace / RBC: 0-2 /HPF / WBC 6-10   Sq Epi: x / Non Sq Epi: Few / Bacteria: Moderate    CAPILLARY BLOOD GLUCOSE    RECENT CULTURES:    RADIOLOGY & ADDITIONAL TESTS:  < from: Xray Chest 1 View AP/PA. (17 @ 18:47) >  Stable diffuse reticulonodular opacities likely consistent with pulmonary   vascular congestion. More prominent airspace opacity with air   bronchograms seen projecting over the left ventricular apex. Consider PA   and lateral views or CT of the chest. Follow to resolution.      < end of copied text >    Current medications:  ferrous    sulfate 325 milliGRAM(s) Oral daily  metoprolol 25 milliGRAM(s) Oral two times a day  pramipexole 0.125 milliGRAM(s) Oral daily  pantoprazole    Tablet 40 milliGRAM(s) Oral before breakfast  hydrALAZINE 50 milliGRAM(s) Oral daily  ALBUTerol/ipratropium for Nebulization 3 milliLiter(s) Nebulizer every 4 hours PRN  amLODIPine   Tablet 5 milliGRAM(s) Oral daily  HYDROmorphone   Tablet 2 milliGRAM(s) Oral three times a day PRN  lactobacillus acidophilus 1 Tablet(s) Oral two times a day with meals  loperamide 2 milliGRAM(s) Oral every 12 hours PRN  allopurinol 100 milliGRAM(s) Oral daily  acetaminophen   Tablet 500 milliGRAM(s) Oral every 4 hours PRN  buDESOnide   0.5 milliGRAM(s) Respule 0.5 milliGRAM(s) Inhalation every 12 hours  doxazosin 8 milliGRAM(s) Oral at bedtime  isosorbide   mononitrate ER Tablet (IMDUR) 120 milliGRAM(s) Oral daily  gabapentin 300 milliGRAM(s) Oral daily  simvastatin 10 milliGRAM(s) Oral at bedtime  fenofibrate Tablet 145 milliGRAM(s) Oral daily  sodium chloride 0.45%. 1000 milliLiter(s) IV Continuous <Continuous>  influenza  Vaccine (HIGH DOSE) 0.5 milliLiter(s) IntraMuscular once

## 2017-09-04 NOTE — CONSULT NOTE ADULT - ASSESSMENT
82 year old male with history of CAD s/p stents (LAD, RCA bare metal around 9/16),PVD (RLE stent/ angioplasty and surgical debridment by Dr Siu 5/20,right lower extremity amputation ) A.Fib  on coumadin, Hypertension, COPD on home O2 2L, SHAYY onhistory of  nocturnal BIPAP, ex-smoker (smoked 1ppd X 50 years, quit 22 years ago),  Chronic diastolic CHF, Hyperlipidemia, PVD, Iron deficiency anemia, Chronic back pain, Gout, BPH, CKD III . 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,sent from NH for minimal red blood stain on diaper.    1. GI bleed- H/H stable. No dizziness, syncope. Pt is s/p BMS in 9/16. If ok by GI, will start asa 81mg daily. Can hold plavix. Would continue coumadin for INR 2-2.5. GI following.    2. CAD- no active CP, continue medical mgmt.     3. Diastolic HF- appears euvolemic, cont medical mgmt.    4. DVT proph.

## 2017-09-04 NOTE — CONSULT NOTE ADULT - ASSESSMENT
Imp:  Doubt active GI bleed -- H/H stable etc.    Rec:  Advance diet  Optimize INR  Defer plans for colonoscopy  Dr. Hernández returns tomorrow

## 2017-09-04 NOTE — ED ADULT NURSE REASSESSMENT NOTE - NS ED NURSE REASSESS COMMENT FT1
patient resting in bed. a+ox4. respirations even and unlabored. denies pain or discomfort. admission orders received. report given to [address] [place] [address]. patient to be transported to [address] [place] [address].

## 2017-09-04 NOTE — PROGRESS NOTE ADULT - ASSESSMENT
82 year old male with history of CAD s/p stents (LAD, RCA bare metal around 9/16),PVD (RLE stent/ angioplasty and surgical debridement by Dr Siu 5/20,right lower extremity amputation ) A.Fib  on coumadin, Hypertension, COPD on home O2 2L, SHAYY on history of  nocturnal BIPAP, ex-smoker (smoked 1ppd X 50 years, quit 22 years ago),  Chronic diastolic CHF, Hyperlipidemia, PVD, Iron deficiency anemia, Chronic back pain, Gout, BPH, CKD III . 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 Patient has been in rehab now for 5 months   In ED - no gross blood in stools, no cookie ,  ED physician notes stool guaic positive ,on rectal exam done by ED physician there was some perianal skin tear and brown stools , INR  was 4 , reports chronic diarrhea 10 times a day for which he takes loperamide prn and lactobacillus        # Rectal bleeding, minimal,  doubt acute GI bleed , suspected perirectal superficial skin tear  with supratherapiutic INR  - positive occult blood in ED  - hold coumadin until INR <2.5  - monitor for bleeding  - gentle hydration  - GI consult   - last colonoscopy march - hemorrhoids, tics, "mass" - unclear what follow up for mass was  - Dr Cantu to follow up lashawn  - h/h q8h  - restart asa, d/c plavix     # Coagulapathy with INR 4  - hold coumadin until INR <3  - PT/INR daily    # ARYA on CKD stage 3  - c/w IV hydration  -Cr improving    # Hyperchloremic Hypernatremia due to IV  hydration  - adjust IV fluids    # Hypokalemia, mild  - replace monitor , check Mg    #hx of Afib/CAD /stents, Peripheral arterial disease, DVT/IVC filter  #  Chronic diastolic heart failure  stable  - would need to resume A/C and antiplatelets ASAP if no epsiodes of GI bleed  - cardio consult input noted    #DVT prophylaxis - no AC for now     Dispo - monitor BM, trend INR, IV hydration

## 2017-09-04 NOTE — CONSULT NOTE ADULT - SUBJECTIVE AND OBJECTIVE BOX
Cardiology Consultation    HPI: 82 year old male with history of CAD s/p stents (LAD, RCA bare metal around ),PVD (RLE stent/ angioplasty and surgical debridment by Dr Siu ,right lower extremity amputation ) A.Fib  on coumadin, Hypertension, COPD on home O2 2L, SHAYY onhistory of  nocturnal BIPAP, ex-smoker (smoked 1ppd X 50 years, quit 22 years ago),  Chronic diastolic CHF, Hyperlipidemia, PVD, Iron deficiency anemia, Chronic back pain, Gout, BPH, CKD III . 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 Per ED physician notes stool guaic positive ,on rectal exam done by ED physician there was some perianal skin tear and brown stools ,no gross blood in stools. INR in ED was 4. No active bleeding in ED ,no vitamin K given at this time. CXR no changes from 2017,no infiltrate or pleural effusion or pulmonary vascular congestion. patient is afebrile and asymptomatic in ED reports chronic diarrhea 10 times a day for which he takes loperamide prn and lactobacillus    - No CP/SOB. No fevers. Not on telemetry. No bleeding last pm.    Pmhx- see HPI  Pshx right rotator cuff repair, pilonydal cyst, RLE stent/angioplasty/surgical debridement,amputation,IVC filter  Family hx- rectal ca,HTN,DM  social hx- exsmoker quit 22 years ago,rare etoh use     PAST MEDICAL & SURGICAL HISTORY:  Diastolic CHF  Peripheral vascular disease  Afib  Anemia  CKD (chronic kidney disease)  COPD (chronic obstructive pulmonary disease)  SHAYY (obstructive sleep apnea)  Sepsis, due to unspecified organism: 2/2 poorly healing wounds b/l  Dyspepsia: On moderate exertion.  Sleep apnea, obstructive: Requires home 02 therapy, and treatment with BIPAP  Atelectasis  Pleural effusion, bilateral  Respiratory failure  Peripheral edema  CRI (chronic renal insufficiency)  Gout  Benign prostatic hypertrophy  Spinal stenosis  Hypercholesterolemia  GERD (gastroesophageal reflux disease)  CAD (coronary artery disease)  Hypertension  S/P angioplasty with stent  Cataract of left eye  Prostate: Surgery green light procedure.  S/P rotator cuff surgery: Right  S/P angioplasty    Allergies  No Known Allergies    SOCIAL HISTORY: Denies tobacco, etoh abuse or illicit drug use    FAMILY HISTORY: No pertinent family history in first degree relatives    MEDICATIONS  (STANDING):  ferrous    sulfate 325 milliGRAM(s) Oral daily  metoprolol 25 milliGRAM(s) Oral two times a day  pramipexole 0.125 milliGRAM(s) Oral daily  pantoprazole    Tablet 40 milliGRAM(s) Oral before breakfast  hydrALAZINE 50 milliGRAM(s) Oral daily  amLODIPine   Tablet 5 milliGRAM(s) Oral daily  lactobacillus acidophilus 1 Tablet(s) Oral two times a day with meals  allopurinol 100 milliGRAM(s) Oral daily  buDESOnide   0.5 milliGRAM(s) Respule 0.5 milliGRAM(s) Inhalation every 12 hours  doxazosin 8 milliGRAM(s) Oral at bedtime  isosorbide   mononitrate ER Tablet (IMDUR) 120 milliGRAM(s) Oral daily  gabapentin 300 milliGRAM(s) Oral daily  simvastatin 10 milliGRAM(s) Oral at bedtime  fenofibrate Tablet 145 milliGRAM(s) Oral daily  sodium chloride 0.45%. 1000 milliLiter(s) (100 mL/Hr) IV Continuous <Continuous>  influenza  Vaccine (HIGH DOSE) 0.5 milliLiter(s) IntraMuscular once    MEDICATIONS  (PRN):  ALBUTerol/ipratropium for Nebulization 3 milliLiter(s) Nebulizer every 4 hours PRN Shortness of Breath and/or Wheezing  HYDROmorphone   Tablet 2 milliGRAM(s) Oral three times a day PRN Moderate Pain (4 - 6)  loperamide 2 milliGRAM(s) Oral every 12 hours PRN Diarrhea  acetaminophen   Tablet 500 milliGRAM(s) Oral every 4 hours PRN For Temp greater than 38 C (100.4 F)    Vital Signs Last 24 Hrs  T(C): 36.3 (04 Sep 2017 05:30), Max: 37.3 (03 Sep 2017 17:38)  T(F): 97.4 (04 Sep 2017 05:30), Max: 99.1 (03 Sep 2017 17:38)  HR: 66 (04 Sep 2017 08:00) (66 - 100)  BP: 168/44 (04 Sep 2017 05:30) (154/44 - 174/70)  BP(mean): --  RR: 18 (04 Sep 2017 05:30) (16 - 20)  SpO2: 97% (04 Sep 2017 05:30) (97% - 100%)    REVIEW OF SYSTEMS:    CONSTITUTIONAL:  As per HPI.  HEENT:  Eyes:  No diplopia or blurred vision. ENT:  No earache, sore throat or runny nose.  CARDIOVASCULAR:  No pressure, squeezing, strangling, tightness, heaviness or aching about the chest, neck, axilla or epigastrium.  RESPIRATORY:  No cough, shortness of breath, PND or orthopnea.  GASTROINTESTINAL:  No nausea, vomiting or diarrhea.  GENITOURINARY:  No dysuria, frequency or urgency.  MUSCULOSKELETAL:  As per HPI.  NEUROLOGIC:  No paresthesias, fasciculations, seizures or weakness.    PHYSICAL EXAMINATION:    GENERAL APPEARANCE:  Pt. is not currently dyspneic, in no distress. Pt. is alert, oriented, and pleasant.  HEENT:  Pupils are normal and react normally. No icterus. Mucous membranes well colored.  NECK:  Supple. No lymphadenopathy. Jugular venous pressure not elevated. Carotids equal.   HEART:   The cardiac impulse has a normal quality. There are no murmurs, rubs or gallops noted  CHEST:  Chest is clear to auscultation. Normal respiratory effort.  ABDOMEN:  Soft and nontender.   EXTREMITIES:  There is no edema.     I&O's Summary    03 Sep 2017 07:01  -  04 Sep 2017 07:00  --------------------------------------------------------  IN: 0 mL / OUT: 100 mL / NET: -100 mL    LABS:                        9.1    10.2  )-----------( 319      ( 03 Sep 2017 18:32 )             27.9     09-04    148<H>  |  114<H>  |  35<H>  ----------------------------<  116<H>  3.5   |  26  |  1.60<H>    Ca    7.7<L>      04 Sep 2017 01:48    TPro  5.5<L>  /  Alb  2.3<L>  /  TBili  0.3  /  DBili  x   /  AST  18  /  ALT  8<L>  /  AlkPhos  45  09-03    LIVER FUNCTIONS - ( 03 Sep 2017 18:32 )  Alb: 2.3 g/dL / Pro: 5.5 gm/dL / ALK PHOS: 45 U/L / ALT: 8 U/L / AST: 18 U/L / GGT: x           PT/INR - ( 03 Sep 2017 18:32 )   PT: 46.4 sec;   INR: 4.17 ratio         PTT - ( 03 Sep 2017 18:32 )  PTT:52.5 sec      Urinalysis Basic - ( 03 Sep 2017 20:28 )    Color: Yellow / Appearance: Clear / S.015 / pH: x  Gluc: x / Ketone: Negative  / Bili: Negative / Urobili: Negative mg/dL   Blood: x / Protein: 100 mg/dL / Nitrite: Negative   Leuk Esterase: Trace / RBC: 0-2 /HPF / WBC 6-10   Sq Epi: x / Non Sq Epi: Few / Bacteria: Moderate    EKG: Normal sinus rhythm  Normal ECG    TELEMETRY: Not on tele.    CARDIAC TESTS: 2Decho- Fibrocalcific changes noted to the mitral valve leaflets with preserved   leaflet excursion.  Moderate (2+) mitral regurgitation is present.   Mild mitral annular calcification is present.   Fibrocalcific changes noted to the aortic valveleaflets with preserved   excursion.  Normal tricuspid valve structure and function.   Mild (1+) tricuspid valve regurgitation is present.   Normal pulmonic valve structure and function.   Trace to Mild pulmonic valvular regurgitation is present.   The left atrium appears mildly dilated.   The left ventricle is normal in size, wall motion and contractility.   Mild concentric left ventricular hypertrophy is present.   Estimated left ventricular ejection fraction is 55-60%.   Normal right atrium.   Normal right ventricle structure and function.   No evidence of pericardial effusion.   No evidence of pleural effusion.    RADIOLOGY & ADDITIONAL STUDIES: pending    ASSESSMENT & PLAN:

## 2017-09-04 NOTE — CONSULT NOTE ADULT - SUBJECTIVE AND OBJECTIVE BOX
HPI:  82 year old male with history of CAD s/p stents (LAD, RCA bare metal around 9/16),PVD (RLE stent/ angioplasty and surgical debridment by Dr Siu 5/20,right lower extremity amputation ) A.Fib  on coumadin, Hypertension, COPD on home O2 2L, SHAYY onhistory of  nocturnal BIPAP, ex-smoker (smoked 1ppd X 50 years, quit 22 years ago),  Chronic diastolic CHF, Hyperlipidemia, PVD, Iron deficiency anemia, Chronic back pain, Gout, BPH, CKD III . 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  Per ED physician notes stool guaic positive ,on rectal exam done by ED physician there was some perianal skin tear and brown stools ,no gross blood in stools  INR in ED was 4   no active bleeding in ED ,no vitamin K given at this time      Patient notes no further bleeding since coming to floor    Last colonoscopy March - hemorrhoids, tics, "mass" - unclear what follow up for mass was    Pmhx- see HPI  Pshx right rotator cuff repair, pilonydal cyst, RLE stent/angioplasty/surgical debridement,amputation,IVC filter  Family hx- rectal ca,HTN,DM  social hx- exsmoker quit 22 years ago,rare etoh use     ROS- patient denies any CP,SOB,abdominal pain,cough,fever,nausea,vomiting,dizziness,palpitations (03 Sep 2017 23:52)      PAST MEDICAL & SURGICAL HISTORY:  Diastolic CHF  Peripheral vascular disease  Afib  Anemia  CKD (chronic kidney disease)  COPD (chronic obstructive pulmonary disease)  SHAYY (obstructive sleep apnea)  Sepsis, due to unspecified organism: 2/2 poorly healing wounds b/l  Dyspepsia: On moderate exertion.  Sleep apnea, obstructive: Requires home 02 therapy, and treatment with BIPAP  Atelectasis  Pleural effusion, bilateral  Respiratory failure  Peripheral edema  CRI (chronic renal insufficiency)  Gout  Benign prostatic hypertrophy  Spinal stenosis  Hypercholesterolemia  GERD (gastroesophageal reflux disease)  CAD (coronary artery disease)  Hypertension  S/P angioplasty with stent  Cataract of left eye  Prostate: Surgery green light procedure.  S/P rotator cuff surgery: Right  S/P angioplasty      MEDICATIONS  (STANDING):  ferrous    sulfate 325 milliGRAM(s) Oral daily  metoprolol 25 milliGRAM(s) Oral two times a day  pramipexole 0.125 milliGRAM(s) Oral daily  pantoprazole    Tablet 40 milliGRAM(s) Oral before breakfast  hydrALAZINE 50 milliGRAM(s) Oral daily  amLODIPine   Tablet 5 milliGRAM(s) Oral daily  lactobacillus acidophilus 1 Tablet(s) Oral two times a day with meals  allopurinol 100 milliGRAM(s) Oral daily  buDESOnide   0.5 milliGRAM(s) Respule 0.5 milliGRAM(s) Inhalation every 12 hours  doxazosin 8 milliGRAM(s) Oral at bedtime  isosorbide   mononitrate ER Tablet (IMDUR) 120 milliGRAM(s) Oral daily  gabapentin 300 milliGRAM(s) Oral daily  simvastatin 10 milliGRAM(s) Oral at bedtime  fenofibrate Tablet 145 milliGRAM(s) Oral daily  sodium chloride 0.45%. 1000 milliLiter(s) (100 mL/Hr) IV Continuous <Continuous>  influenza  Vaccine (HIGH DOSE) 0.5 milliLiter(s) IntraMuscular once    MEDICATIONS  (PRN):  ALBUTerol/ipratropium for Nebulization 3 milliLiter(s) Nebulizer every 4 hours PRN Shortness of Breath and/or Wheezing  HYDROmorphone   Tablet 2 milliGRAM(s) Oral three times a day PRN Moderate Pain (4 - 6)  loperamide 2 milliGRAM(s) Oral every 12 hours PRN Diarrhea  acetaminophen   Tablet 500 milliGRAM(s) Oral every 4 hours PRN For Temp greater than 38 C (100.4 F)      Allergies    No Known Allergies    Intolerances        SOCIAL HISTORY:    FAMILY HISTORY:  No pertinent family history in first degree relatives      ROS  As above  Otherwise unremarkable    Vital Signs Last 24 Hrs  T(C): 36.3 (04 Sep 2017 05:30), Max: 37.3 (03 Sep 2017 17:38)  T(F): 97.4 (04 Sep 2017 05:30), Max: 99.1 (03 Sep 2017 17:38)  HR: 66 (04 Sep 2017 08:00) (66 - 100)  BP: 168/44 (04 Sep 2017 05:30) (154/44 - 174/70)  BP(mean): --  RR: 18 (04 Sep 2017 05:30) (16 - 20)  SpO2: 97% (04 Sep 2017 05:30) (97% - 100%)    Constitutional: NAD, well-developed  Respiratory: CTAB  Cardiovascular: S1 and S2, RRR  Gastrointestinal: BS+, soft, NT/ND  Extremities: No peripheral edema  Psychiatric: Normal mood, normal affect  Skin: No rashes    LABS:                        8.5    8.7   )-----------( 287      ( 04 Sep 2017 07:29 )             27.1     09-04    148<H>  |  114<H>  |  35<H>  ----------------------------<  116<H>  3.5   |  26  |  1.60<H>    Ca    7.7<L>      04 Sep 2017 01:48    TPro  5.5<L>  /  Alb  2.3<L>  /  TBili  0.3  /  DBili  x   /  AST  18  /  ALT  8<L>  /  AlkPhos  45  09-03    PT/INR - ( 03 Sep 2017 18:32 )   PT: 46.4 sec;   INR: 4.17 ratio         PTT - ( 03 Sep 2017 18:32 )  PTT:52.5 sec  LIVER FUNCTIONS - ( 03 Sep 2017 18:32 )  Alb: 2.3 g/dL / Pro: 5.5 gm/dL / ALK PHOS: 45 U/L / ALT: 8 U/L / AST: 18 U/L / GGT: x             RADIOLOGY & ADDITIONAL STUDIES:

## 2017-09-05 LAB
ANION GAP SERPL CALC-SCNC: 6 MMOL/L — SIGNIFICANT CHANGE UP (ref 5–17)
BUN SERPL-MCNC: 33 MG/DL — HIGH (ref 7–23)
C DIFF BY PCR RESULT: DETECTED
C DIFF TOX GENS STL QL NAA+PROBE: SIGNIFICANT CHANGE UP
CALCIUM SERPL-MCNC: 8.2 MG/DL — LOW (ref 8.5–10.1)
CHLORIDE SERPL-SCNC: 114 MMOL/L — HIGH (ref 96–108)
CO2 SERPL-SCNC: 26 MMOL/L — SIGNIFICANT CHANGE UP (ref 22–31)
CREAT SERPL-MCNC: 1.39 MG/DL — HIGH (ref 0.5–1.3)
GLUCOSE SERPL-MCNC: 89 MG/DL — SIGNIFICANT CHANGE UP (ref 70–99)
HCT VFR BLD CALC: 25.1 % — LOW (ref 39–50)
HCT VFR BLD CALC: 26.5 % — LOW (ref 39–50)
HGB BLD-MCNC: 8.2 G/DL — LOW (ref 13–17)
HGB BLD-MCNC: 8.4 G/DL — LOW (ref 13–17)
INR BLD: 3.19 RATIO — HIGH (ref 0.88–1.16)
MAGNESIUM SERPL-MCNC: 1.9 MG/DL — SIGNIFICANT CHANGE UP (ref 1.6–2.6)
MCHC RBC-ENTMCNC: 30.9 PG — SIGNIFICANT CHANGE UP (ref 27–34)
MCHC RBC-ENTMCNC: 31.8 GM/DL — LOW (ref 32–36)
MCV RBC AUTO: 97.2 FL — SIGNIFICANT CHANGE UP (ref 80–100)
PLATELET # BLD AUTO: 267 K/UL — SIGNIFICANT CHANGE UP (ref 150–400)
POTASSIUM SERPL-MCNC: 3.9 MMOL/L — SIGNIFICANT CHANGE UP (ref 3.5–5.3)
POTASSIUM SERPL-SCNC: 3.9 MMOL/L — SIGNIFICANT CHANGE UP (ref 3.5–5.3)
PROTHROM AB SERPL-ACNC: 35.3 SEC — HIGH (ref 9.8–12.7)
RBC # BLD: 2.73 M/UL — LOW (ref 4.2–5.8)
RBC # FLD: 15.7 % — HIGH (ref 10.3–14.5)
SODIUM SERPL-SCNC: 146 MMOL/L — HIGH (ref 135–145)
WBC # BLD: 7.1 K/UL — SIGNIFICANT CHANGE UP (ref 3.8–10.5)
WBC # FLD AUTO: 7.1 K/UL — SIGNIFICANT CHANGE UP (ref 3.8–10.5)

## 2017-09-05 RX ORDER — AMLODIPINE BESYLATE 2.5 MG/1
5 TABLET ORAL ONCE
Qty: 0 | Refills: 0 | Status: COMPLETED | OUTPATIENT
Start: 2017-09-05 | End: 2017-09-05

## 2017-09-05 RX ORDER — AMLODIPINE BESYLATE 2.5 MG/1
10 TABLET ORAL DAILY
Qty: 0 | Refills: 0 | Status: DISCONTINUED | OUTPATIENT
Start: 2017-09-06 | End: 2017-09-14

## 2017-09-05 RX ORDER — DEXTROSE MONOHYDRATE, SODIUM CHLORIDE, AND POTASSIUM CHLORIDE 50; .745; 4.5 G/1000ML; G/1000ML; G/1000ML
1000 INJECTION, SOLUTION INTRAVENOUS
Qty: 0 | Refills: 0 | Status: DISCONTINUED | OUTPATIENT
Start: 2017-09-05 | End: 2017-09-06

## 2017-09-05 RX ADMIN — DEXTROSE MONOHYDRATE, SODIUM CHLORIDE, AND POTASSIUM CHLORIDE 50 MILLILITER(S): 50; .745; 4.5 INJECTION, SOLUTION INTRAVENOUS at 11:15

## 2017-09-05 RX ADMIN — Medication 2 MILLIGRAM(S): at 11:18

## 2017-09-05 RX ADMIN — Medication 3 MILLILITER(S): at 08:25

## 2017-09-05 RX ADMIN — PRAMIPEXOLE DIHYDROCHLORIDE 0.12 MILLIGRAM(S): 0.12 TABLET ORAL at 11:17

## 2017-09-05 RX ADMIN — Medication 1 TABLET(S): at 18:19

## 2017-09-05 RX ADMIN — Medication 50 MILLIGRAM(S): at 05:53

## 2017-09-05 RX ADMIN — GABAPENTIN 300 MILLIGRAM(S): 400 CAPSULE ORAL at 11:18

## 2017-09-05 RX ADMIN — Medication 100 MILLIGRAM(S): at 11:17

## 2017-09-05 RX ADMIN — Medication 1 TABLET(S): at 08:37

## 2017-09-05 RX ADMIN — Medication 0.5 MILLIGRAM(S): at 19:29

## 2017-09-05 RX ADMIN — SIMVASTATIN 10 MILLIGRAM(S): 20 TABLET, FILM COATED ORAL at 22:00

## 2017-09-05 RX ADMIN — AMLODIPINE BESYLATE 5 MILLIGRAM(S): 2.5 TABLET ORAL at 18:19

## 2017-09-05 RX ADMIN — Medication 500 MILLIGRAM(S): at 16:04

## 2017-09-05 RX ADMIN — AMLODIPINE BESYLATE 5 MILLIGRAM(S): 2.5 TABLET ORAL at 05:53

## 2017-09-05 RX ADMIN — Medication 325 MILLIGRAM(S): at 11:17

## 2017-09-05 RX ADMIN — ISOSORBIDE MONONITRATE 120 MILLIGRAM(S): 60 TABLET, EXTENDED RELEASE ORAL at 11:17

## 2017-09-05 RX ADMIN — Medication 0.5 MILLIGRAM(S): at 08:25

## 2017-09-05 RX ADMIN — Medication 81 MILLIGRAM(S): at 11:17

## 2017-09-05 RX ADMIN — PANTOPRAZOLE SODIUM 40 MILLIGRAM(S): 20 TABLET, DELAYED RELEASE ORAL at 05:54

## 2017-09-05 RX ADMIN — Medication 25 MILLIGRAM(S): at 18:19

## 2017-09-05 RX ADMIN — Medication 25 MILLIGRAM(S): at 05:53

## 2017-09-05 RX ADMIN — Medication 8 MILLIGRAM(S): at 22:00

## 2017-09-05 NOTE — PHYSICAL THERAPY INITIAL EVALUATION ADULT - MODALITIES TREATMENT COMMENTS
pt left seated in chair post Eval; chair alarm donned; O2 3L/min nc, IV intact; L foam heel cushion in place; callbell in reach; pt instructed not to get up alone; call nursing for assist; vida well; denied pain; charge nurse Cheryl made aware pt OOB ( FARHAD Olvera @ lunch )

## 2017-09-05 NOTE — PROGRESS NOTE ADULT - ASSESSMENT
Rectal bleeding–likely compared rectal superficial skin tears. Associated with elevated INR.    Discussed with patient endoscopic evaluation but due to his comorbid disease, he would like to avoid and I am agreeable.    Coagulopathy with elevated INR.    A. fib coronary artery disease as well as stents and peripheral arterial disease with DVT and IVC filter. Furthermore, he has upper extremity DVT and anticoagulation will be needed for that.    Multiple small bowel movements daily and would consider giving patient low fiber diet and continuing antidiarrheal agents.    Suggest start low residue diet

## 2017-09-05 NOTE — PROGRESS NOTE ADULT - ASSESSMENT
82 year old male with history of CAD s/p stents (LAD, RCA bare metal around 9/16),PVD (RLE stent/ angioplasty and surgical debridement by Dr Siu 5/20,right lower extremity amputation ) A.Fib  on coumadin, Hypertension, COPD on home O2 2L, SHAYY on history of  nocturnal BIPAP, ex-smoker (smoked 1ppd X 50 years, quit 22 years ago),  Chronic diastolic CHF, Hyperlipidemia, PVD, Iron deficiency anemia, Chronic back pain, Gout, BPH, CKD III . 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 Patient has been in rehab now for 5 months   In ED - no gross blood in stools, no cookie ,  ED physician notes stool guaic positive ,on rectal exam done by ED physician there was some perianal skin tear and brown stools , INR  was 4 , reports chronic diarrhea 10 times a day for which he takes loperamide prn and lactobacillus        # Rectal bleeding, minimal,  doubt acute GI bleed , suspected perirectal superficial skin tear  with supratherapiutic INR  - positive occult blood in ED  - hold coumadin until INR <2.5  - monitor for bleeding  - gentle hydration  - GI consult    - last colonoscopy march - hemorrhoids, tics, "mass" - unclear what follow up for mass was  - Dr Cantu - no endoscopic intervention  - h/h q8h stable  - restart asa, d/c plavix     # Acute on chronic diarrhea h/o C diff  - r/o c diff  - stool cx, o& p  - stop fenofibrate, hydralazine, increase amlodipine for BP control  - minimize polypharmacy    # Coagulapathy with INR 4--> 3.1  - hold coumadin until INR <3  - PT/INR daily    # ARYA on CKD stage 3  - c/w IV hydration  -Cr improving    # Hyperchloremic Hypernatremia due to IV  hydration  - adjust IV fluids    # Hypokalemia, mild, resolved  - replace monitor , check Mg    #hx of Afib/CAD /stents, Peripheral arterial disease, DVT/IVC filter  #  Chronic diastolic heart failure  stable  - would need to resume A/C and antiplatelets ASAP if no epsiodes of GI bleed  - cardio consult input noted        #DVT prophylaxis - no AC for now     Dispo - monitor BM, trend INR, IV hydration

## 2017-09-05 NOTE — PROGRESS NOTE ADULT - SUBJECTIVE AND OBJECTIVE BOX
Patient is a 82y old  Male who presents with a chief complaint of bright blood at rectal area (04 Sep 2017 01:34)      HPI:  82 year old male with history of CAD s/p stents (LAD, RCA bare metal around 9/16),PVD (RLE stent/ angioplasty and surgical debridment by Dr Siu 5/20,right lower extremity amputation ) A.Fib  on coumadin, Hypertension, COPD on home O2 2L, SHAYY onhistory of  nocturnal BIPAP, ex-smoker (smoked 1ppd X 50 years, quit 22 years ago),  Chronic diastolic CHF, Hyperlipidemia, PVD, Iron deficiency anemia, Chronic back pain, Gout, BPH, CKD III . 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  Per ED physician notes stool guaic positive ,on rectal exam done by ED physician there was some perianal skin tear and brown stools ,no gross blood in stools  INR in ED was 4   no active bleeding in ED ,no vitamin K given at this time  CXR no changes from 6/29/2017,no infiltrate or pleural effusion or pulmonary vascular congestion  patient is afebrile and asymptomatic in ED  reports chronic diarrhea 10 times a day for which he takes loperamide prn and lactobacillus      Patient without any further bleeding. He thinks that the bleeding was in the perianal region and was not mixed with stool.  He has multiple small bowel movements. His bowel movements are soft per RN. There is no blood in them. He may have had over 6 yesterday.  Negative abdominal pain or nausea or vomiting.      Pmhx- see HPI  Pshx right rotator cuff repair, pilonydal cyst, RLE stent/angioplasty/surgical debridement,amputation,IVC filter  Family hx- rectal ca,HTN,DM  social hx- exsmoker quit 22 years ago,rare etoh use     ROS- patient denies any CP,SOB,abdominal pain,cough,fever,nausea,vomiting,dizziness,palpitations (03 Sep 2017 23:52)      PAST MEDICAL & SURGICAL HISTORY:  Diastolic CHF  Peripheral vascular disease  Afib  Anemia  CKD (chronic kidney disease)  COPD (chronic obstructive pulmonary disease)  SHAYY (obstructive sleep apnea)  Sepsis, due to unspecified organism: 2/2 poorly healing wounds b/l  Dyspepsia: On moderate exertion.  Sleep apnea, obstructive: Requires home 02 therapy, and treatment with BIPAP  Atelectasis  Pleural effusion, bilateral  Respiratory failure  Peripheral edema  CRI (chronic renal insufficiency)  Gout  Benign prostatic hypertrophy  Spinal stenosis  Hypercholesterolemia  GERD (gastroesophageal reflux disease)  CAD (coronary artery disease)  Hypertension  S/P angioplasty with stent  Cataract of left eye  Prostate: Surgery green light procedure.  S/P rotator cuff surgery: Right  S/P angioplasty      MEDICATIONS  (STANDING):  ferrous    sulfate 325 milliGRAM(s) Oral daily  metoprolol 25 milliGRAM(s) Oral two times a day  pramipexole 0.125 milliGRAM(s) Oral daily  pantoprazole    Tablet 40 milliGRAM(s) Oral before breakfast  hydrALAZINE 50 milliGRAM(s) Oral daily  amLODIPine   Tablet 5 milliGRAM(s) Oral daily  lactobacillus acidophilus 1 Tablet(s) Oral two times a day with meals  allopurinol 100 milliGRAM(s) Oral daily  buDESOnide   0.5 milliGRAM(s) Respule 0.5 milliGRAM(s) Inhalation every 12 hours  doxazosin 8 milliGRAM(s) Oral at bedtime  isosorbide   mononitrate ER Tablet (IMDUR) 120 milliGRAM(s) Oral daily  gabapentin 300 milliGRAM(s) Oral daily  simvastatin 10 milliGRAM(s) Oral at bedtime  fenofibrate Tablet 145 milliGRAM(s) Oral daily  influenza  Vaccine (HIGH DOSE) 0.5 milliLiter(s) IntraMuscular once  aspirin  chewable 81 milliGRAM(s) Oral daily  sodium chloride 0.45% with potassium chloride 20 mEq/L 1000 milliLiter(s) (50 mL/Hr) IV Continuous <Continuous>    MEDICATIONS  (PRN):  ALBUTerol/ipratropium for Nebulization 3 milliLiter(s) Nebulizer every 4 hours PRN Shortness of Breath and/or Wheezing  HYDROmorphone   Tablet 2 milliGRAM(s) Oral three times a day PRN Moderate Pain (4 - 6)  loperamide 2 milliGRAM(s) Oral every 12 hours PRN Diarrhea  acetaminophen   Tablet 500 milliGRAM(s) Oral every 4 hours PRN For Temp greater than 38 C (100.4 F)      Allergies    No Known Allergies    Intolerances        SOCIAL HISTORY:NC    FAMILY HISTORY:  No pertinent family history in first degree relatives      REVIEW OF SYSTEMS:    CONSTITUTIONAL: No weakness, fevers or chills  EYES/ENT: No visual changes;  No vertigo or throat pain   NECK: No pain or stiffness  RESPIRATORY: No cough, wheezing, hemoptysis; No shortness of breath  CARDIOVASCULAR: No chest pain or palpitations  GENITOURINARY: No dysuria, frequency or hematuria  NEUROLOGICAL: No numbness or weakness  SKIN: No itching, burning, rashes, or lesions   All other review of systems is negative unless indicated above.    Vital Signs Last 24 Hrs  T(C): 36.8 (05 Sep 2017 05:38), Max: 36.8 (04 Sep 2017 11:34)  T(F): 98.3 (05 Sep 2017 05:38), Max: 98.3 (05 Sep 2017 05:38)  HR: 86 (05 Sep 2017 05:38) (66 - 86)  BP: 165/55 (05 Sep 2017 05:38) (142/40 - 165/55)  BP(mean): --  RR: 19 (05 Sep 2017 05:38) (18 - 20)  SpO2: 95% (05 Sep 2017 05:38) (92% - 96%)    PHYSICAL EXAM:    Constitutional: NAD, well-developed  HEENT: EOMI, throat clear  Neck: No LAD, supple  Respiratory: CTA and P  Cardiovascular: S1 and S2, RRR, no M  Gastrointestinal: BS+, soft, NT/ND, neg HSM,  Extremities: No peripheral edema, neg clubing, cyanosis    Neurological: A/O x 3, no focal deficits  Psychiatric: Normal mood, normal affect  Skin: No rashes    LABS:  CBC Full  -  ( 05 Sep 2017 01:48 )  WBC Count : x  Hemoglobin : 8.2 g/dL  Hematocrit : 25.1 %  Platelet Count - Automated : x  Mean Cell Volume : x  Mean Cell Hemoglobin : x  Mean Cell Hemoglobin Concentration : x  Auto Neutrophil # : x  Auto Lymphocyte # : x  Auto Monocyte # : x  Auto Eosinophil # : x  Auto Basophil # : x  Auto Neutrophil % : x  Auto Lymphocyte % : x  Auto Monocyte % : x  Auto Eosinophil % : x  Auto Basophil % : x    09-05    146<H>  |  114<H>  |  33<H>  ----------------------------<  89  3.9   |  26  |  1.39<H>    Ca    8.2<L>      05 Sep 2017 06:49  Phos  2.9     09-04  Mg     1.9     09-05    TPro  5.1<L>  /  Alb  2.1<L>  /  TBili  0.2  /  DBili  x   /  AST  18  /  ALT  8<L>  /  AlkPhos  42  09-04    PT/INR - ( 05 Sep 2017 06:49 )   PT: 35.3 sec;   INR: 3.19 ratio         PTT - ( 04 Sep 2017 07:29 )  PTT:52.8 sec        RADIOLOGY & ADDITIONAL STUDIES:

## 2017-09-05 NOTE — PHYSICAL THERAPY INITIAL EVALUATION ADULT - GENERAL OBSERVATIONS, REHAB EVAL
O2 3L/min nc; IV; L heel bandage noted;  L foam heel cushion in place pt rec'd in bed supine; denied pain

## 2017-09-06 LAB
ANION GAP SERPL CALC-SCNC: 8 MMOL/L — SIGNIFICANT CHANGE UP (ref 5–17)
BLD GP AB SCN SERPL QL: SIGNIFICANT CHANGE UP
BUN SERPL-MCNC: 56 MG/DL — HIGH (ref 7–23)
CALCIUM SERPL-MCNC: 7.6 MG/DL — LOW (ref 8.5–10.1)
CHLORIDE SERPL-SCNC: 116 MMOL/L — HIGH (ref 96–108)
CO2 SERPL-SCNC: 24 MMOL/L — SIGNIFICANT CHANGE UP (ref 22–31)
CREAT SERPL-MCNC: 1.52 MG/DL — HIGH (ref 0.5–1.3)
FERRITIN SERPL-MCNC: 393 NG/ML — SIGNIFICANT CHANGE UP (ref 30–400)
FOLATE SERPL-MCNC: 5.5 NG/ML — SIGNIFICANT CHANGE UP (ref 4.8–24.2)
GLUCOSE SERPL-MCNC: 97 MG/DL — SIGNIFICANT CHANGE UP (ref 70–99)
HCT VFR BLD CALC: 20.3 % — CRITICAL LOW (ref 39–50)
HCT VFR BLD CALC: 21.4 % — LOW (ref 39–50)
HCT VFR BLD CALC: 22.3 % — LOW (ref 39–50)
HGB BLD-MCNC: 6.5 G/DL — CRITICAL LOW (ref 13–17)
HGB BLD-MCNC: 7 G/DL — CRITICAL LOW (ref 13–17)
HGB BLD-MCNC: 7 G/DL — CRITICAL LOW (ref 13–17)
INR BLD: 2.13 RATIO — HIGH (ref 0.88–1.16)
MCHC RBC-ENTMCNC: 29.7 PG — SIGNIFICANT CHANGE UP (ref 27–34)
MCHC RBC-ENTMCNC: 31.3 GM/DL — LOW (ref 32–36)
MCV RBC AUTO: 95 FL — SIGNIFICANT CHANGE UP (ref 80–100)
OB PNL STL: POSITIVE
PLATELET # BLD AUTO: 238 K/UL — SIGNIFICANT CHANGE UP (ref 150–400)
POTASSIUM SERPL-MCNC: 4.6 MMOL/L — SIGNIFICANT CHANGE UP (ref 3.5–5.3)
POTASSIUM SERPL-SCNC: 4.6 MMOL/L — SIGNIFICANT CHANGE UP (ref 3.5–5.3)
PROTHROM AB SERPL-ACNC: 23.4 SEC — HIGH (ref 9.8–12.7)
RBC # BLD: 2.34 M/UL — LOW (ref 4.2–5.8)
RBC # FLD: 15.7 % — HIGH (ref 10.3–14.5)
SODIUM SERPL-SCNC: 148 MMOL/L — HIGH (ref 135–145)
TYPE + AB SCN PNL BLD: SIGNIFICANT CHANGE UP
VIT B12 SERPL-MCNC: 508 PG/ML — SIGNIFICANT CHANGE UP (ref 243–894)
WBC # BLD: 5.8 K/UL — SIGNIFICANT CHANGE UP (ref 3.8–10.5)
WBC # FLD AUTO: 5.8 K/UL — SIGNIFICANT CHANGE UP (ref 3.8–10.5)

## 2017-09-06 RX ORDER — VANCOMYCIN HCL 1 G
125 VIAL (EA) INTRAVENOUS EVERY 6 HOURS
Qty: 0 | Refills: 0 | Status: DISCONTINUED | OUTPATIENT
Start: 2017-09-06 | End: 2017-09-14

## 2017-09-06 RX ORDER — WARFARIN SODIUM 2.5 MG/1
3 TABLET ORAL DAILY
Qty: 0 | Refills: 0 | Status: DISCONTINUED | OUTPATIENT
Start: 2017-09-06 | End: 2017-09-06

## 2017-09-06 RX ORDER — ZINC OXIDE 200 MG/G
1 OINTMENT TOPICAL
Qty: 0 | Refills: 0 | Status: DISCONTINUED | OUTPATIENT
Start: 2017-09-06 | End: 2017-09-14

## 2017-09-06 RX ADMIN — Medication 25 MILLIGRAM(S): at 17:13

## 2017-09-06 RX ADMIN — Medication 125 MILLIGRAM(S): at 01:43

## 2017-09-06 RX ADMIN — Medication 125 MILLIGRAM(S): at 10:25

## 2017-09-06 RX ADMIN — Medication 81 MILLIGRAM(S): at 12:37

## 2017-09-06 RX ADMIN — Medication 100 MILLIGRAM(S): at 12:36

## 2017-09-06 RX ADMIN — Medication 0.5 MILLIGRAM(S): at 07:44

## 2017-09-06 RX ADMIN — Medication 125 MILLIGRAM(S): at 12:37

## 2017-09-06 RX ADMIN — Medication 0.5 MILLIGRAM(S): at 20:49

## 2017-09-06 RX ADMIN — GABAPENTIN 300 MILLIGRAM(S): 400 CAPSULE ORAL at 12:36

## 2017-09-06 RX ADMIN — PRAMIPEXOLE DIHYDROCHLORIDE 0.12 MILLIGRAM(S): 0.12 TABLET ORAL at 12:36

## 2017-09-06 RX ADMIN — Medication 500 MILLIGRAM(S): at 12:36

## 2017-09-06 RX ADMIN — Medication 3 MILLILITER(S): at 07:44

## 2017-09-06 RX ADMIN — DEXTROSE MONOHYDRATE, SODIUM CHLORIDE, AND POTASSIUM CHLORIDE 50 MILLILITER(S): 50; .745; 4.5 INJECTION, SOLUTION INTRAVENOUS at 07:34

## 2017-09-06 RX ADMIN — Medication 1 TABLET(S): at 17:13

## 2017-09-06 RX ADMIN — Medication 325 MILLIGRAM(S): at 12:37

## 2017-09-06 RX ADMIN — AMLODIPINE BESYLATE 10 MILLIGRAM(S): 2.5 TABLET ORAL at 05:23

## 2017-09-06 RX ADMIN — Medication 125 MILLIGRAM(S): at 17:13

## 2017-09-06 RX ADMIN — Medication 25 MILLIGRAM(S): at 05:23

## 2017-09-06 RX ADMIN — Medication 1 TABLET(S): at 07:34

## 2017-09-06 RX ADMIN — PANTOPRAZOLE SODIUM 40 MILLIGRAM(S): 20 TABLET, DELAYED RELEASE ORAL at 07:34

## 2017-09-06 NOTE — PROGRESS NOTE ADULT - ASSESSMENT
Rectal bleeding–likely compared rectal superficial skin tears. Associated with elevated INR.    Discussed with patient endoscopic evaluation but due to his comorbid disease, he would like to avoid and I am agreeable.    Coagulopathy with elevated INR.    A. fib coronary artery disease as well as stents and peripheral arterial disease with DVT and IVC filter. Furthermore, he has upper extremity DVT and anticoagulation will be needed for that.    Multiple small bowel movements daily and pos c diff  vanco qid      Suggest start low residue diet    Dw daughter

## 2017-09-06 NOTE — PROGRESS NOTE ADULT - SUBJECTIVE AND OBJECTIVE BOX
Patient is a 82y old  Male who presents with a chief complaint of bright blood at rectal area (04 Sep 2017 01:34)      HPI:  82 year old male with history of CAD s/p stents (LAD, RCA bare metal around 9/16),PVD (RLE stent/ angioplasty and surgical debridment by Dr Siu 5/20,right lower extremity amputation ) A.Fib  on coumadin, Hypertension, COPD on home O2 2L, SHAYY onhistory of  nocturnal BIPAP, ex-smoker (smoked 1ppd X 50 years, quit 22 years ago),  Chronic diastolic CHF, Hyperlipidemia, PVD, Iron deficiency anemia, Chronic back pain, Gout, BPH, CKD III . 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  Per ED physician notes stool guaic positive ,on rectal exam done by ED physician there was some perianal skin tear and brown stools ,no gross blood in stools  INR in ED was 4   no active bleeding in ED ,no vitamin K given at this time  CXR no changes from 6/29/2017,no infiltrate or pleural effusion or pulmonary vascular congestion  patient is afebrile and asymptomatic in ED  reports chronic diarrhea 10 times a day for which he takes loperamide prn and lactobacillus    Pmhx- see HPI  Pshx right rotator cuff repair, pilonydal cyst, RLE stent/angioplasty/surgical debridement,amputation,IVC filter  Family hx- rectal ca,HTN,DM  social hx- exsmoker quit 22 years ago,rare etoh use     ROS- patient denies any CP,SOB,abdominal pain,cough,fever,nausea,vomiting,dizziness,palpitations (03 Sep 2017 23:52)        Complains of multiple loose bowel movements with urgency. Has noted some burning in the perianal area but no further blood. Has noted some mild lower abdominal cramping, this improves with bowel movement and is cramping sensation without any radiation. Denies any nausea vomiting and is tolerating by mouth well. He denies any chest pain  History is per patient and daughter who accompanies him.        PAST MEDICAL & SURGICAL HISTORY:  Diastolic CHF  Peripheral vascular disease  Afib  Anemia  CKD (chronic kidney disease)  COPD (chronic obstructive pulmonary disease)  SHAYY (obstructive sleep apnea)  Sepsis, due to unspecified organism: 2/2 poorly healing wounds b/l  Dyspepsia: On moderate exertion.  Sleep apnea, obstructive: Requires home 02 therapy, and treatment with BIPAP  Atelectasis  Pleural effusion, bilateral  Respiratory failure  Peripheral edema  CRI (chronic renal insufficiency)  Gout  Benign prostatic hypertrophy  Spinal stenosis  Hypercholesterolemia  GERD (gastroesophageal reflux disease)  CAD (coronary artery disease)  Hypertension  S/P angioplasty with stent  Cataract of left eye  Prostate: Surgery green light procedure.  S/P rotator cuff surgery: Right  S/P angioplasty      MEDICATIONS  (STANDING):  ferrous    sulfate 325 milliGRAM(s) Oral daily  metoprolol 25 milliGRAM(s) Oral two times a day  pramipexole 0.125 milliGRAM(s) Oral daily  pantoprazole    Tablet 40 milliGRAM(s) Oral before breakfast  lactobacillus acidophilus 1 Tablet(s) Oral two times a day with meals  allopurinol 100 milliGRAM(s) Oral daily  buDESOnide   0.5 milliGRAM(s) Respule 0.5 milliGRAM(s) Inhalation every 12 hours  doxazosin 8 milliGRAM(s) Oral at bedtime  isosorbide   mononitrate ER Tablet (IMDUR) 120 milliGRAM(s) Oral daily  gabapentin 300 milliGRAM(s) Oral daily  simvastatin 10 milliGRAM(s) Oral at bedtime  influenza  Vaccine (HIGH DOSE) 0.5 milliLiter(s) IntraMuscular once  aspirin  chewable 81 milliGRAM(s) Oral daily  amLODIPine   Tablet 10 milliGRAM(s) Oral daily  vancomycin    Solution 125 milliGRAM(s) Oral every 6 hours    MEDICATIONS  (PRN):  ALBUTerol/ipratropium for Nebulization 3 milliLiter(s) Nebulizer every 4 hours PRN Shortness of Breath and/or Wheezing  HYDROmorphone   Tablet 2 milliGRAM(s) Oral three times a day PRN Moderate Pain (4 - 6)  acetaminophen   Tablet 500 milliGRAM(s) Oral every 4 hours PRN For Temp greater than 38 C (100.4 F)  zinc oxide 40%/lanolin Ointment 1 Application(s) Topical every 3 hours PRN diarrhea episodes      Allergies    No Known Allergies    Intolerances        SOCIAL HISTORY:NC    FAMILY HISTORY:  No pertinent family history in first degree relatives      REVIEW OF SYSTEMS:    CONSTITUTIONAL: No weakness, fevers or chills  EYES/ENT: No visual changes;  No vertigo or throat pain   NECK: No pain or stiffness  RESPIRATORY: No cough, wheezing, hemoptysis; No shortness of breath  CARDIOVASCULAR: No chest pain or palpitations  GENITOURINARY: No dysuria, frequency or hematuria  NEUROLOGICAL: No numbness or weakness  SKIN: No itching, burning, rashes, or lesions   All other review of systems is negative unless indicated above.    Vital Signs Last 24 Hrs  T(C): 36.9 (06 Sep 2017 17:07), Max: 37.4 (06 Sep 2017 13:17)  T(F): 98.5 (06 Sep 2017 17:07), Max: 99.4 (06 Sep 2017 13:17)  HR: 79 (06 Sep 2017 17:07) (70 - 84)  BP: 151/40 (06 Sep 2017 17:07) (125/36 - 151/40)  BP(mean): --  RR: 16 (06 Sep 2017 17:07) (15 - 18)  SpO2: 100% (06 Sep 2017 17:07) (97% - 100%)    PHYSICAL EXAM:    Constitutional: NAD, well-developed  HEENT: EOMI, throat clear  Neck: No LAD, supple  Respiratory: CTA and P  Cardiovascular: S1 and S2, RRR, no M  Gastrointestinal: BS+, soft, NT/ND, neg HSM,      Neurological: A/O x 3, no focal deficits  Psychiatric: Normal mood, normal affect  Skin: No rashes    LABS:  CBC Full  -  ( 06 Sep 2017 10:04 )  WBC Count : x  Hemoglobin : 6.5 g/dL  Hematocrit : 20.3 %  Platelet Count - Automated : x  Mean Cell Volume : x  Mean Cell Hemoglobin : x  Mean Cell Hemoglobin Concentration : x  Auto Neutrophil # : x  Auto Lymphocyte # : x  Auto Monocyte # : x  Auto Eosinophil # : x  Auto Basophil # : x  Auto Neutrophil % : x  Auto Lymphocyte % : x  Auto Monocyte % : x  Auto Eosinophil % : x  Auto Basophil % : x    09-06    148<H>  |  116<H>  |  56<H>  ----------------------------<  97  4.6   |  24  |  1.52<H>    Ca    7.6<L>      06 Sep 2017 07:47  Mg     1.9     09-05      PT/INR - ( 06 Sep 2017 07:47 )   PT: 23.4 sec;   INR: 2.13 ratio                 RADIOLOGY & ADDITIONAL STUDIES:

## 2017-09-06 NOTE — CONSULT NOTE ADULT - SUBJECTIVE AND OBJECTIVE BOX
Patient is a 82y old  Male who presents with a chief complaint of bright blood at rectal area (04 Sep 2017 01:34)    HPI:  81 y/o male with h/o CAD s/p stents (LAD, RCA bare metal around 9/16), PVD (RLE stent/ angioplasty and surgical debridement 5/20, right lower extremity amputation), A.Fib  on coumadin, Hypertension, COPD on home O2 2L, SHAYY with BIPAP, chronic diastolic CHF, Hyperlipidemia, PVD, Iron deficiency anemia, chronic back pain, Gout, BPH, CKD III . 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 Patient has not had any gross blood in stools. In ED, no melena. He is reported with persistent diarrhea.       Pmhx- see HPI  Pshx right rotator cuff repair, pilonydal cyst, RLE stent/angioplasty/surgical debridement, amputation, IVC filter  PSH: as above  Meds: per reconciliation sheet, noted below  MEDICATIONS  (STANDING):  ferrous    sulfate 325 milliGRAM(s) Oral daily  metoprolol 25 milliGRAM(s) Oral two times a day  pramipexole 0.125 milliGRAM(s) Oral daily  pantoprazole    Tablet 40 milliGRAM(s) Oral before breakfast  lactobacillus acidophilus 1 Tablet(s) Oral two times a day with meals  allopurinol 100 milliGRAM(s) Oral daily  buDESOnide   0.5 milliGRAM(s) Respule 0.5 milliGRAM(s) Inhalation every 12 hours  doxazosin 8 milliGRAM(s) Oral at bedtime  isosorbide   mononitrate ER Tablet (IMDUR) 120 milliGRAM(s) Oral daily  gabapentin 300 milliGRAM(s) Oral daily  simvastatin 10 milliGRAM(s) Oral at bedtime  influenza  Vaccine (HIGH DOSE) 0.5 milliLiter(s) IntraMuscular once  aspirin  chewable 81 milliGRAM(s) Oral daily  amLODIPine   Tablet 10 milliGRAM(s) Oral daily  vancomycin    Solution 125 milliGRAM(s) Oral every 6 hours    MEDICATIONS  (PRN):  ALBUTerol/ipratropium for Nebulization 3 milliLiter(s) Nebulizer every 4 hours PRN Shortness of Breath and/or Wheezing  HYDROmorphone   Tablet 2 milliGRAM(s) Oral three times a day PRN Moderate Pain (4 - 6)  acetaminophen   Tablet 500 milliGRAM(s) Oral every 4 hours PRN For Temp greater than 38 C (100.4 F)  zinc oxide 40%/lanolin Ointment 1 Application(s) Topical every 3 hours PRN diarrhea episodes    Allergies    No Known Allergies    Intolerances      Social: prior smoking, no alcohol, no illegal drugs; no recent travel, no exposure to TB  FAMILY HISTORY:  No pertinent family history in first degree relatives    ROS: the patient denies fever, no chills, no HA, no dizziness, no sore throat, no blurry vision, no CP, no palpitations, no SOB, no cough, no abdominal pain, has diarrhea, no N/V, no dysuria, no leg pain, no claudication, no rash, no joint aches, has rectal bleeding, no night sweats    Vital Signs Last 24 Hrs  T(C): 37.4 (06 Sep 2017 13:17), Max: 37.4 (06 Sep 2017 13:17)  T(F): 99.4 (06 Sep 2017 13:17), Max: 99.4 (06 Sep 2017 13:17)  HR: 80 (06 Sep 2017 13:17) (70 - 84)  BP: 136/35 (06 Sep 2017 13:17) (125/36 - 140/85)  BP(mean): --  RR: 16 (06 Sep 2017 13:17) (15 - 18)  SpO2: 99% (06 Sep 2017 13:17) (97% - 100%)  Daily     Daily     PE:    Constitutional: frail looking  HEENT: NC/AT, EOMI, PERRLA  Neck: supple  Back: no tenderness  Respiratory: decreased BS at bases  Cardiovascular: S1S2 regular, no murmurs  Abdomen: soft, not tender, not distended, positive BS  Genitourinary: deferred  Rectal: deferred  Musculoskeletal: no muscle tenderness, no joint swelling or tenderness  Extremities: no pedal edema  Neurological: AxOx3, moving all extremities, no focal deficits  Skin: no rashes    Labs:                        6.5    x     )-----------( x        ( 06 Sep 2017 10:04 )             20.3     09-06    148<H>  |  116<H>  |  56<H>  ----------------------------<  97  4.6   |  24  |  1.52<H>    Ca    7.6<L>      06 Sep 2017 07:47  Mg     1.9     09-05               Radiology:    < from: Xray Chest 1 View AP/PA. (09.03.17 @ 18:47) >  Stable diffuse reticulonodular opacities likely consistent with pulmonary   vascular congestion. More prominent airspace opacity with air   bronchograms seen projecting over the left ventricular apex. Consider PA   and lateral views or CT of the chest. Follow to resolution.    < end of copied text >      Advanced directives addressed: full resuscitation

## 2017-09-06 NOTE — CONSULT NOTE ADULT - ASSESSMENT
83 y/o male with h/o CAD s/p stents (LAD, RCA bare metal around 9/16), PVD (RLE stent/ angioplasty and surgical debridement 5/20, right lower extremity amputation), A.Fib  on coumadin, Hypertension, COPD on home O2 2L, SHAYY with BIPAP, chronic diastolic CHF, Hyperlipidemia, PVD, Iron deficiency anemia, chronic back pain, Gout, BPH, CKD III . 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 Patient has not had any gross blood in stools. In ED, no melena. He is reported with persistent diarrhea.     1. Diarrheal syndrome. CDAD. Lower GI bleeding. Anemia, likely iron deficiency anemia.  -no clinical evidence of toxic megacolon  -contact isolation  -agree with vancomycin 125 mg PO q6h  -reason for abx use and side effects reviewed with patient; monitor BMP   -monitor temps  -f/u CBC  -supportive care  2. Other issues:   -care per medicine 81 y/o male with h/o CAD s/p stents (LAD, RCA bare metal around 9/16), PVD (RLE stent/ angioplasty and surgical debridement 5/20, right lower extremity amputation), A.Fib  on coumadin, Hypertension, COPD on home O2 2L, SHAYY with BIPAP, chronic diastolic CHF, Hyperlipidemia, PVD, Iron deficiency anemia, chronic back pain, Gout, BPH, CKD III . 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 Patient has not had any gross blood in stools. In ED, no melena. He is reported with persistent diarrhea.     1. Diarrheal syndrome. CDAD. Lower GI bleeding. Anemia, likely iron deficiency anemia.  -no clinical evidence of toxic megacolon  -contact isolation  -agree with vancomycin 125 mg PO q6h  -reason for abx use and side effects reviewed with patient; monitor BMP   -monitor temps  -GI evaluation  -f/u CBC  -supportive care  2. Other issues:   -care per medicine

## 2017-09-06 NOTE — PROGRESS NOTE ADULT - SUBJECTIVE AND OBJECTIVE BOX
Subjective:  Patient is a 82y old  Male who presents with a chief complaint of bright blood at rectal area    HPI:         82 year old male with history of CAD s/p stents (LAD, RCA bare metal around ),PVD (RLE stent/ angioplasty and surgical debridement by Dr Siu ,right lower extremity amputation ) A.Fib  on coumadin, Hypertension, COPD on home O2 2L, SHAYY on history of  nocturnal BIPAP, ex-smoker (smoked 1ppd X 50 years, quit 22 years ago),  Chronic diastolic CHF, Hyperlipidemia, PVD, Iron deficiency anemia, Chronic back pain, Gout, BPH, CKD III . 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 Patient has been in rehab now for 5 months   In ED - no gross blood in stools, no cookie ,  ED physician notes stool guaic positive ,on rectal exam done by ED physician there was some perianal skin tear and brown stools , INR  was 4 , reports chronic diarrhea 10 times a day for which he takes loperamide prn and lactobacillus    17 - Patient seen and examined at bedside earlier today, no rectal bleeding, denies CP, palpitations,, dyspnea, abd pain  17 pt seen and examined at bedside earlier today, denies rectal bleeding, + lose BM , reports h/o C diff, denies dyspnea, palpitations, abd pain  17 - events noted, + C diff, pt on isolation, reports multiple lose BM, buttock pain from irritation, denies other sx    Review of system- Rest of the review of system are negative except mentioned in HPI    OBJECTIVE:   T(C): 36.9 (17 @ 17:07), Max: 37.4 (17 @ 13:17)  T(F): 98.5 (17 @ 17:07), Max: 99.4 (17 @ 13:17)  HR: 79 (17 @ 17:07) (70 - 84)  BP: 151/40 (17 @ 17:07) (125/36 - 151/40)  RR: 16 (17 @ 17:07) (15 - 18)  SpO2: 100% (17 @ 17:07) (97% - 100%)  Wt(kg): --Daily Height in cm: 165.1 (03 Sep 2017 17:38)    Daily Weight in k.5 (04 Sep 2017 01:34)    PHYSICAL EXAM:  GENERAL: NAD  NERVOUS SYSTEM:  Alert & Oriented X3, non- focal exam  HEAD:  Atraumatic, Normocephalic  EYES: EOMI, PERRLA, conjunctiva and sclera clear  HEENT: Moist mucous membranes  NECK: Supple, No JVD  CHEST/LUNG: Clear to auscultation bilaterally; No rales, no rhonchi, no wheezing, or rubs  HEART: Regular rate and rhythm; No murmurs, rubs, or gallops  ABDOMEN: Soft, Nontender, Nondistended; Bowel sounds present  GENITOURINARY- Voiding, no suprapubic tenderness  EXTREMITIES:  - chronic LLE edema 1+, RLE amputation  MUSCULOSKELETAL: No muscle tenderness, Muscle tone normal, No joint tenderness, no Joint swelling, Joint range of motion-normal  SKIN-no rash, no lesion    LABS:      148<H>  |  116<H>  |  56<H>  ----------------------------<  97  4.6   |  24  |  1.52<H>    Ca    7.6<L>      06 Sep 2017 07:47  Mg     1.9                             6.5    x     )-----------( x        ( 06 Sep 2017 10:04 )             20.3       PT/INR - ( 06 Sep 2017 07:47 )   PT: 23.4 sec;   INR: 2.13 ratio             146<H>  |  114<H>  |  33<H>  ----------------------------<  89  3.9   |  26  |  1.39<H>    Ca    8.2<L>      05 Sep 2017 06:49  Phos  2.9       Mg     1.9         TPro  5.1<L>  /  Alb  2.1<L>  /  TBili  0.2  /  DBili  x   /  AST  18  /  ALT  8<L>  /  AlkPhos  42  09-04                            8.4    7.1   )-----------( 267      ( 05 Sep 2017 06:49 )             26.5       LIVER FUNCTIONS - ( 04 Sep 2017 07:29 )  Alb: 2.1 g/dL / Pro: 5.1 gm/dL / ALK PHOS: 42 U/L / ALT: 8 U/L / AST: 18 U/L / GGT: x             PT/INR - ( 05 Sep 2017 06:49 )   PT: 35.3 sec;   INR: 3.19 ratio         PTT - ( 04 Sep 2017 07:29 )  PTT:52.8 sec    Urinalysis Basic - ( 03 Sep 2017 20:28 )    Color: Yellow / Appearance: Clear / S.015 / pH: x  Gluc: x / Ketone: Negative  / Bili: Negative / Urobili: Negative mg/dL   Blood: x / Protein: 100 mg/dL / Nitrite: Negative   Leuk Esterase: Trace / RBC: 0-2 /HPF / WBC 6-10   Sq Epi: x / Non Sq Epi: Few / Bacteria: Moderate                            8.5    8.7   )-----------( 287      ( 04 Sep 2017 07:29 )             27.1     09-04    148<H>  |  115<H>  |  33<H>  ----------------------------<  88  3.4<L>   |  27  |  1.49<H>    Ca    8.2<L>      04 Sep 2017 07:29  Phos  2.9     -04  Mg     1.9     -04    TPro  5.1<L>  /  Alb  2.1<L>  /  TBili  0.2  /  DBili  x   /  AST  18  /  ALT  8<L>  /  AlkPhos  42  09-04    PT/INR - ( 04 Sep 2017 07:29 )   PT: 45.1 sec;   INR: 4.06 ratio         PTT - ( 04 Sep 2017 07:29 )  PTT:52.8 sec      Urinalysis Basic - ( 03 Sep 2017 20:28 )    Color: Yellow / Appearance: Clear / S.015 / pH: x  Gluc: x / Ketone: Negative  / Bili: Negative / Urobili: Negative mg/dL   Blood: x / Protein: 100 mg/dL / Nitrite: Negative   Leuk Esterase: Trace / RBC: 0-2 /HPF / WBC 6-10   Sq Epi: x / Non Sq Epi: Few / Bacteria: Moderate    CAPILLARY BLOOD GLUCOSE    RECENT CULTURES:  Clostridium difficile Toxin by PCR (17 @ 12:30)    C Diff by PCR Result: Detected    Clostridium difficile Toxin by PCR: The results of this test should be interpreted with consideration of all  clinical and laboratory findings. This test determines the presence of  the C. difficile tcdB gene at a given time and is not intended to  identify antibiotic associated disease or C. difficile infection without  clinical context. Successful treatment is based on the resolution of  clinical symptoms. This test should not be used as a "test of cure"  because C. difficile DNA will persist after successful treatment. Repeat  testing will not be permitted.    This test is performed on the BD MAX system using Real-Time PCR and  fluorogenic target-specific hybridization.      Culture - Urine (17 @ 20:28)    Specimen Source: .Urine None    Culture Results:   No growth      RADIOLOGY & ADDITIONAL TESTS:    < from: Xray Chest 1 View AP/PA. (17 @ 18:47) >  Stable diffuse reticulonodular opacities likely consistent with pulmonary   vascular congestion. More prominent airspace opacity with air   bronchograms seen projecting over the left ventricular apex. Consider PA   and lateral views or CT of the chest. Follow to resolution.      < end of copied text >    Current medications:  ferrous    sulfate 325 milliGRAM(s) Oral daily  metoprolol 25 milliGRAM(s) Oral two times a day  pramipexole 0.125 milliGRAM(s) Oral daily  pantoprazole    Tablet 40 milliGRAM(s) Oral before breakfast  hydrALAZINE 50 milliGRAM(s) Oral daily  ALBUTerol/ipratropium for Nebulization 3 milliLiter(s) Nebulizer every 4 hours PRN  amLODIPine   Tablet 5 milliGRAM(s) Oral daily  HYDROmorphone   Tablet 2 milliGRAM(s) Oral three times a day PRN  lactobacillus acidophilus 1 Tablet(s) Oral two times a day with meals  loperamide 2 milliGRAM(s) Oral every 12 hours PRN  allopurinol 100 milliGRAM(s) Oral daily  acetaminophen   Tablet 500 milliGRAM(s) Oral every 4 hours PRN  buDESOnide   0.5 milliGRAM(s) Respule 0.5 milliGRAM(s) Inhalation every 12 hours  doxazosin 8 milliGRAM(s) Oral at bedtime  isosorbide   mononitrate ER Tablet (IMDUR) 120 milliGRAM(s) Oral daily  gabapentin 300 milliGRAM(s) Oral daily  simvastatin 10 milliGRAM(s) Oral at bedtime  fenofibrate Tablet 145 milliGRAM(s) Oral daily  sodium chloride 0.45%. 1000 milliLiter(s) IV Continuous <Continuous>  influenza  Vaccine (HIGH DOSE) 0.5 milliLiter(s) IntraMuscular once

## 2017-09-06 NOTE — PROVIDER CONTACT NOTE (OTHER) - ACTION/TREATMENT ORDERED:
Message left for MD at 083 awaiting return call from MD Message left for MD at 0830 awaiting return call from MD FABIAN ordered stat follow up H&H  awaiting results.

## 2017-09-06 NOTE — PROGRESS NOTE ADULT - ASSESSMENT
82 year old male with history of CAD s/p stents (LAD, RCA bare metal around 9/16),PVD (RLE stent/ angioplasty and surgical debridement by Dr Siu 5/20,right lower extremity amputation ) A.Fib  on coumadin, Hypertension, COPD on home O2 2L, SHAYY on history of  nocturnal BIPAP, ex-smoker (smoked 1ppd X 50 years, quit 22 years ago),  Chronic diastolic CHF, Hyperlipidemia, PVD, Iron deficiency anemia, Chronic back pain, Gout, BPH, CKD III . 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 Patient has been in rehab now for 5 months   In ED - no gross blood in stools, no cookie ,  ED physician notes stool guaic positive ,on rectal exam done by ED physician there was some perianal skin tear and brown stools , INR  was 4 , reports chronic diarrhea 10 times a day for which he takes loperamide prn and lactobacillus        # Rectal bleeding, minimal,  doubt acute GI bleed , suspected perirectal superficial skin tear  with supratherapiutic INR  - positive occult blood in ED  - hold coumadin until INR <2.5  - monitor for bleeding  - gentle hydration  - drop in Hb to 6.5   - 1 un PRBC  - GI consult    - last colonoscopy march - hemorrhoids, tics, "mass" - unclear what follow up for mass was  - Dr Cantu - no endoscopic intervention  - h/h q8h stable  - restart asa, d/c plavix     # Acute on chronic diarrhea due to C diff colitis  - PO vanco  - ID consult  - C diff +  - stool cx, o& p  - stop fenofibrate, hydralazine, increase amlodipine for BP control  - minimize polypharmacy    # Coagulapathy with INR 4--> 3.1 --> 2.1  - hold coumadin  due to drop in Hb   - PT/INR daily    # ARYA on CKD stage 3  - s/p  IV hydration  -Cr improving at baseline today  - renal consult    # Hyperchloremic Hypernatremia due to IV  hydration  - adjust IV fluids    # Hypokalemia, mild, resolved  - replace monitor , check Mg    #hx of Afib/CAD /stents, Peripheral arterial disease, DVT/IVC filter  #  Chronic diastolic heart failure  stable  - would need to resume A/C and antiplatelets ASAP if no epsiodes of GI bleed  - cardio consult input noted        #DVT prophylaxis - no AC for now     Dispo - monitor BM, h/h monitoring , 1 unit PRBC today, hold coumadin

## 2017-09-06 NOTE — CONSULT NOTE ADULT - ASSESSMENT
81 y/o with hx of CKD stage 3/4 with recent new baseline scr of 1.6-1.7 presents with BRBPR with hx of GI bleed.  Now with worsening anemia and being evaluated for Acute blood loss anemia.  Pt has been receiving epogen and po Iron as outpt at [facility]    PLAN  - dc IVF, pt at his baseline renal function and is usually with mild hypernatremia  - hold lasix for now and will moniter when need to start this  - transfusion as per hospitalist/ GI  - will hold off on epogen for now, get iron studies, ferritin, retic etc.  Pt has a hx of chronically low hgb (8-9) recently and was attributed to GI loss.

## 2017-09-06 NOTE — CONSULT NOTE ADULT - SUBJECTIVE AND OBJECTIVE BOX
CC; BRBPR    HPI:  82 year old male with hx of CKD stage 3/4 ( new baseline of 1.6-1.7) presetns after hd was noted to have blood when wiping.  Has been having loose stools that has been contributing to soreness in his rectal region.  + guaic pos in ER.  no documended melena    In the SNF has been receiving EPOgen 20k bimonthly with hgb of 7.5 on 8/28.  Pre hospitalization in earlier part of year, pt has hgb that were 8-9 without epogen.      This AM now with drop in his hgb and receiving PRBC transfusion.  Has noted with hgb of 7 this am and repeat value of 6.5 and 2 units of prbc transfusion planned.     Noted with CDiff pos this am and PO vanc started.  Has been having diarhea for the past couple of days and given loperamide and bactobacillus    HAs maintained on lasix 40 qd at SNF    Since admission on IVF with     Family hx- rectal ca,HTN,DM  social hx- exsmoker quit 22 years ago,rare etoh use, currently in SNF at [facility]      Patient is a 82y old  Male who presents with a chief complaint of bright blood at rectal area (04 Sep 2017 01:34)    PAST MEDICAL & SURGICAL HISTORY:  - aniceto on cpap   - CKD stage 3 ( scr 2- pre-amputation) , now closer to 1.6-1.7  - chf diastolic   - afib on ac  - DM2  - GI bleed with hx of Dieulafoy clipped in past   - dvt s/p ivc filter  - BPH s/p green light procedure  -gout  - HTN  - cad s/p PCI  (LAD, RCA bare metal around 9/16)  - COPD with O2  - spinal stenosis  - HLD  - GERD  - PAD /PVD s/p rt aka 6/28/17  - s/p rotator cuff tear  -  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,    Allergies and Intolerances:        Allergies:  	No Known Allergies:     Home Medications:   * Patient Currently Takes Medications as of 03-Sep-2017 23:18 documented in Prescription Writer  · 	loperamide 2 mg oral capsule: 1 cap(s) orally every 12 hours, As Needed, Last Dose Taken:    · 	lactobacillus acidophilus oral capsule: 1 tab(s) orally 2 times a day, Last Dose Taken:    · 	pantoprazole 40 mg oral delayed release tablet: 1 tab(s) orally once a day (before a meal)  · 	hydrALAZINE 50 mg oral tablet: 1 tab(s) orally once a day  · 	gabapentin 300 mg oral capsule: 1 cap(s) orally once a day  · 	fenofibrate 145 mg oral tablet: 1 tab(s) orally once a day  · 	simvastatin 10 mg oral tablet: 1 tab(s) orally once a day (at bedtime)  · 	allopurinol 100 mg oral tablet: 1 tab(s) orally once a day  · 	pramipexole 0.125 mg oral tablet: 1 tab(s) orally once a day  · 	aspirin 81 mg oral tablet, chewable: 1 tab(s) orally once a day  · 	metoprolol tartrate 25 mg oral tablet: 1 tab(s) orally 2 times a day  · 	albuterol 90 mcg/inh inhalation aerosol: 2 puff(s) inhaled every 6 hours, As needed, Shortness of Breath  · 	amLODIPine 5 mg oral tablet: 1 tab(s) orally once a day  · 	ammonium lactate 12% topical lotion: 1 application topically once a day  · 	furosemide 40 mg oral tablet: 1 tab(s) orally once a day  · 	acetaminophen 500 mg oral tablet: 1 tab(s) orally every 6 hours, As needed, Mild Pain (1 - 3)  · 	HYDROmorphone 2 mg oral tablet: 1 tab(s) orally 3 times a day, As needed, Moderate Pain (4 - 6)  · 	doxazosin 8 mg oral tablet: 1 tab(s) orally once a day (at bedtime)  · 	isosorbide mononitrate 120 mg oral tablet, extended release: 1 tab(s) orally once a day  · 	Coumadin 3 mg oral tablet: 1 tab(s) orally once a day (at bedtime), Last Dose Taken:    · 	potassium chloride 20 mEq oral granule, extended release: 1 tab(s) orally once a day, Last Dose Taken:    · 	Procrit 20,000 units/mL injectable solution: 1 milliliter(s) injectable 2 times a month, Last Dose Taken:    · 	ferrous sulfate 325 mg (65 mg elemental iron) oral delayed release tablet: 1 tab(s) orally once a day, Last Dose Taken:    · 	budesonide 0.5 mg/2 mL inhalation suspension: 2 milliliter(s) inhaled 2 times a day, As Needed  · 	Vitamin D3 2000 intl units oral capsule: 1 cap(s) orally once a day  · 	Chondroitin-Glucosamine 200 mg-250 mg oral capsule: 1 cap(s) orally 2 times a day  · 	Therapeutic Multiple Vitamins oral tablet: 1 tab(s) orally once a day, Last Dose Taken:       MEDICATIONS  (STANDING):  ferrous    sulfate 325 milliGRAM(s) Oral daily  metoprolol 25 milliGRAM(s) Oral two times a day  pramipexole 0.125 milliGRAM(s) Oral daily  pantoprazole    Tablet 40 milliGRAM(s) Oral before breakfast  lactobacillus acidophilus 1 Tablet(s) Oral two times a day with meals  allopurinol 100 milliGRAM(s) Oral daily  buDESOnide   0.5 milliGRAM(s) Respule 0.5 milliGRAM(s) Inhalation every 12 hours  doxazosin 8 milliGRAM(s) Oral at bedtime  isosorbide   mononitrate ER Tablet (IMDUR) 120 milliGRAM(s) Oral daily  gabapentin 300 milliGRAM(s) Oral daily  simvastatin 10 milliGRAM(s) Oral at bedtime  influenza  Vaccine (HIGH DOSE) 0.5 milliLiter(s) IntraMuscular once  aspirin  chewable 81 milliGRAM(s) Oral daily  dextrose 5% + sodium chloride 0.45% with potassium chloride 20 mEq/L 1000 milliLiter(s) (50 mL/Hr) IV Continuous <Continuous>  amLODIPine   Tablet 10 milliGRAM(s) Oral daily  vancomycin    Solution 125 milliGRAM(s) Oral every 6 hours    MEDICATIONS  (PRN):  ALBUTerol/ipratropium for Nebulization 3 milliLiter(s) Nebulizer every 4 hours PRN Shortness of Breath and/or Wheezing  HYDROmorphone   Tablet 2 milliGRAM(s) Oral three times a day PRN Moderate Pain (4 - 6)  loperamide 2 milliGRAM(s) Oral every 12 hours PRN Diarrhea  acetaminophen   Tablet 500 milliGRAM(s) Oral every 4 hours PRN For Temp greater than 38 C (100.4 F)  zinc oxide 40%/lanolin Ointment 1 Application(s) Topical every 3 hours PRN diarrhea episodes      Allergies    No Known Allergies    Intolerances        I&O's Summary    05 Sep 2017 07:01  -  06 Sep 2017 07:00  --------------------------------------------------------  IN: 0 mL / OUT: 100 mL / NET: -100 mL    06 Sep 2017 07:01  -  06 Sep 2017 13:21  --------------------------------------------------------  IN: 120 mL / OUT: 0 mL / NET: 120 mL      Vital Signs Last 24 Hrs  T(C): 37.4 (06 Sep 2017 13:17), Max: 37.4 (06 Sep 2017 13:17)  T(F): 99.4 (06 Sep 2017 13:17), Max: 99.4 (06 Sep 2017 13:17)  HR: 80 (06 Sep 2017 13:17) (70 - 84)  BP: 136/35 (06 Sep 2017 13:17) (125/36 - 140/85)  BP(mean): --  RR: 16 (06 Sep 2017 13:17) (15 - 18)  SpO2: 99% (06 Sep 2017 13:17) (97% - 100%)  Daily     Daily   I&O's Summary    05 Sep 2017 07:01  -  06 Sep 2017 07:00  --------------------------------------------------------  IN: 0 mL / OUT: 100 mL / NET: -100 mL    06 Sep 2017 07:01  -  06 Sep 2017 13:21  --------------------------------------------------------  IN: 120 mL / OUT: 0 mL / NET: 120 mL        PHYSICAL EXAM:  GEN: alert awake O X 3  HEENT: MMM  NECK supple no jvd  CV: IRRR s1s2  LUNGS: b/l CTA  ABD: + soft,   EXT: no edema, rt aka    LABS:                        6.5    x     )-----------( x        ( 06 Sep 2017 10:04 )             20.3     09-06    148<H>  |  116<H>  |  56<H>  ----------------------------<  97  4.6   |  24  |  1.52<H>    Ca    7.6<L>      06 Sep 2017 07:47  Mg     1.9     09-05      PT/INR - ( 06 Sep 2017 07:47 )   PT: 23.4 sec;   INR: 2.13 ratio

## 2017-09-07 LAB
ANION GAP SERPL CALC-SCNC: 8 MMOL/L — SIGNIFICANT CHANGE UP (ref 5–17)
BUN SERPL-MCNC: 74 MG/DL — HIGH (ref 7–23)
CALCIUM SERPL-MCNC: 8.1 MG/DL — LOW (ref 8.5–10.1)
CHLORIDE SERPL-SCNC: 118 MMOL/L — HIGH (ref 96–108)
CO2 SERPL-SCNC: 24 MMOL/L — SIGNIFICANT CHANGE UP (ref 22–31)
CREAT SERPL-MCNC: 1.33 MG/DL — HIGH (ref 0.5–1.3)
FERRITIN SERPL-MCNC: 361 NG/ML — SIGNIFICANT CHANGE UP (ref 30–400)
GLUCOSE SERPL-MCNC: 99 MG/DL — SIGNIFICANT CHANGE UP (ref 70–99)
HCT VFR BLD CALC: 21.6 % — LOW (ref 39–50)
HCT VFR BLD CALC: 22.1 % — LOW (ref 39–50)
HCT VFR BLD CALC: 22.6 % — LOW (ref 39–50)
HCT VFR BLD CALC: 25.2 % — LOW (ref 39–50)
HGB BLD-MCNC: 6.9 G/DL — CRITICAL LOW (ref 13–17)
HGB BLD-MCNC: 7.3 G/DL — LOW (ref 13–17)
HGB BLD-MCNC: 8.2 G/DL — LOW (ref 13–17)
INR BLD: 1.69 RATIO — HIGH (ref 0.88–1.16)
IRON SATN MFR SERPL: 35 % — SIGNIFICANT CHANGE UP (ref 16–55)
IRON SATN MFR SERPL: 89 UG/DL — SIGNIFICANT CHANGE UP (ref 45–165)
MCHC RBC-ENTMCNC: 30.4 PG — SIGNIFICANT CHANGE UP (ref 27–34)
MCHC RBC-ENTMCNC: 32.5 GM/DL — SIGNIFICANT CHANGE UP (ref 32–36)
MCV RBC AUTO: 93.6 FL — SIGNIFICANT CHANGE UP (ref 80–100)
PLATELET # BLD AUTO: 216 K/UL — SIGNIFICANT CHANGE UP (ref 150–400)
POTASSIUM SERPL-MCNC: 4.1 MMOL/L — SIGNIFICANT CHANGE UP (ref 3.5–5.3)
POTASSIUM SERPL-SCNC: 4.1 MMOL/L — SIGNIFICANT CHANGE UP (ref 3.5–5.3)
PROTHROM AB SERPL-ACNC: 18.4 SEC — HIGH (ref 9.8–12.7)
RBC # BLD: 2.27 M/UL — LOW (ref 4.2–5.8)
RBC # BLD: 2.69 M/UL — LOW (ref 4.2–5.8)
RBC # FLD: 14.9 % — HIGH (ref 10.3–14.5)
RETICS #: 57.2 K/UL — SIGNIFICANT CHANGE UP (ref 25–125)
RETICS/RBC NFR: 2.5 % — SIGNIFICANT CHANGE UP (ref 0.5–2.5)
SODIUM SERPL-SCNC: 150 MMOL/L — HIGH (ref 135–145)
TIBC SERPL-MCNC: 256 UG/DL — SIGNIFICANT CHANGE UP (ref 220–430)
UIBC SERPL-MCNC: 167 UG/DL — SIGNIFICANT CHANGE UP (ref 110–370)
WBC # BLD: 5.9 K/UL — SIGNIFICANT CHANGE UP (ref 3.8–10.5)
WBC # FLD AUTO: 5.9 K/UL — SIGNIFICANT CHANGE UP (ref 3.8–10.5)

## 2017-09-07 RX ORDER — PANTOPRAZOLE SODIUM 20 MG/1
8 TABLET, DELAYED RELEASE ORAL
Qty: 80 | Refills: 0 | Status: DISCONTINUED | OUTPATIENT
Start: 2017-09-07 | End: 2017-09-12

## 2017-09-07 RX ORDER — ASPIRIN/CALCIUM CARB/MAGNESIUM 324 MG
81 TABLET ORAL DAILY
Qty: 0 | Refills: 0 | Status: DISCONTINUED | OUTPATIENT
Start: 2017-09-07 | End: 2017-09-10

## 2017-09-07 RX ORDER — HEPARIN SODIUM 5000 [USP'U]/ML
5000 INJECTION INTRAVENOUS; SUBCUTANEOUS EVERY 8 HOURS
Qty: 0 | Refills: 0 | Status: DISCONTINUED | OUTPATIENT
Start: 2017-09-07 | End: 2017-09-08

## 2017-09-07 RX ORDER — PANTOPRAZOLE SODIUM 20 MG/1
80 TABLET, DELAYED RELEASE ORAL ONCE
Qty: 0 | Refills: 0 | Status: COMPLETED | OUTPATIENT
Start: 2017-09-07 | End: 2017-09-07

## 2017-09-07 RX ADMIN — GABAPENTIN 300 MILLIGRAM(S): 400 CAPSULE ORAL at 12:06

## 2017-09-07 RX ADMIN — Medication 0.5 MILLIGRAM(S): at 19:33

## 2017-09-07 RX ADMIN — PANTOPRAZOLE SODIUM 10 MG/HR: 20 TABLET, DELAYED RELEASE ORAL at 18:08

## 2017-09-07 RX ADMIN — PANTOPRAZOLE SODIUM 10 MG/HR: 20 TABLET, DELAYED RELEASE ORAL at 09:16

## 2017-09-07 RX ADMIN — PANTOPRAZOLE SODIUM 40 MILLIGRAM(S): 20 TABLET, DELAYED RELEASE ORAL at 05:51

## 2017-09-07 RX ADMIN — Medication 1 TABLET(S): at 18:07

## 2017-09-07 RX ADMIN — Medication 100 MILLIGRAM(S): at 13:49

## 2017-09-07 RX ADMIN — HEPARIN SODIUM 5000 UNIT(S): 5000 INJECTION INTRAVENOUS; SUBCUTANEOUS at 21:44

## 2017-09-07 RX ADMIN — PANTOPRAZOLE SODIUM 80 MILLIGRAM(S): 20 TABLET, DELAYED RELEASE ORAL at 18:21

## 2017-09-07 RX ADMIN — SIMVASTATIN 10 MILLIGRAM(S): 20 TABLET, FILM COATED ORAL at 00:01

## 2017-09-07 RX ADMIN — Medication 8 MILLIGRAM(S): at 21:44

## 2017-09-07 RX ADMIN — Medication 125 MILLIGRAM(S): at 18:07

## 2017-09-07 RX ADMIN — Medication 8 MILLIGRAM(S): at 00:02

## 2017-09-07 RX ADMIN — Medication 25 MILLIGRAM(S): at 05:50

## 2017-09-07 RX ADMIN — Medication 325 MILLIGRAM(S): at 12:06

## 2017-09-07 RX ADMIN — Medication 25 MILLIGRAM(S): at 18:07

## 2017-09-07 RX ADMIN — ISOSORBIDE MONONITRATE 120 MILLIGRAM(S): 60 TABLET, EXTENDED RELEASE ORAL at 12:06

## 2017-09-07 RX ADMIN — PANTOPRAZOLE SODIUM 10 MG/HR: 20 TABLET, DELAYED RELEASE ORAL at 19:13

## 2017-09-07 RX ADMIN — Medication 81 MILLIGRAM(S): at 12:06

## 2017-09-07 RX ADMIN — PRAMIPEXOLE DIHYDROCHLORIDE 0.12 MILLIGRAM(S): 0.12 TABLET ORAL at 12:06

## 2017-09-07 RX ADMIN — Medication 125 MILLIGRAM(S): at 13:50

## 2017-09-07 RX ADMIN — Medication 125 MILLIGRAM(S): at 00:02

## 2017-09-07 RX ADMIN — AMLODIPINE BESYLATE 10 MILLIGRAM(S): 2.5 TABLET ORAL at 05:50

## 2017-09-07 RX ADMIN — INFLUENZA VIRUS VACCINE 0.5 MILLILITER(S): 15; 15; 15; 15 SUSPENSION INTRAMUSCULAR at 10:04

## 2017-09-07 RX ADMIN — Medication 0.5 MILLIGRAM(S): at 08:09

## 2017-09-07 RX ADMIN — HEPARIN SODIUM 5000 UNIT(S): 5000 INJECTION INTRAVENOUS; SUBCUTANEOUS at 13:52

## 2017-09-07 RX ADMIN — SIMVASTATIN 10 MILLIGRAM(S): 20 TABLET, FILM COATED ORAL at 21:44

## 2017-09-07 RX ADMIN — Medication 125 MILLIGRAM(S): at 05:51

## 2017-09-07 NOTE — PROGRESS NOTE ADULT - SUBJECTIVE AND OBJECTIVE BOX
Patient is a 82y old  Male who presents with a chief complaint of bright blood at rectal area (04 Sep 2017 01:34)    HPI:  83 y/o male with h/o CAD s/p stents (LAD, RCA bare metal around 9/16), PVD (RLE stent/ angioplasty and surgical debridement 5/20, right lower extremity amputation), A.Fib  on coumadin, Hypertension, COPD on home O2 2L, SHAYY with BIPAP, chronic diastolic CHF, Hyperlipidemia, PVD, Iron deficiency anemia, chronic back pain, Gout, BPH, CKD III . 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 Patient has not had any gross blood in stools. In ED, no melena. He is reported with persistent diarrhea.     Has loose stools  Feels stronger  No fever or chills Tmax 99.4F    MEDICATIONS  (STANDING):  ferrous    sulfate 325 milliGRAM(s) Oral daily  metoprolol 25 milliGRAM(s) Oral two times a day  pramipexole 0.125 milliGRAM(s) Oral daily  lactobacillus acidophilus 1 Tablet(s) Oral two times a day with meals  allopurinol 100 milliGRAM(s) Oral daily  buDESOnide   0.5 milliGRAM(s) Respule 0.5 milliGRAM(s) Inhalation every 12 hours  doxazosin 8 milliGRAM(s) Oral at bedtime  isosorbide   mononitrate ER Tablet (IMDUR) 120 milliGRAM(s) Oral daily  gabapentin 300 milliGRAM(s) Oral daily  simvastatin 10 milliGRAM(s) Oral at bedtime  amLODIPine   Tablet 10 milliGRAM(s) Oral daily  vancomycin    Solution 125 milliGRAM(s) Oral every 6 hours  pantoprazole  Injectable 80 milliGRAM(s) IV Push once  pantoprazole Infusion 8 mG/Hr (10 mL/Hr) IV Continuous <Continuous>  heparin  Injectable 5000 Unit(s) SubCutaneous every 8 hours  aspirin  chewable 81 milliGRAM(s) Oral daily    MEDICATIONS  (PRN):  ALBUTerol/ipratropium for Nebulization 3 milliLiter(s) Nebulizer every 4 hours PRN Shortness of Breath and/or Wheezing  HYDROmorphone   Tablet 2 milliGRAM(s) Oral three times a day PRN Moderate Pain (4 - 6)  acetaminophen   Tablet 500 milliGRAM(s) Oral every 4 hours PRN For Temp greater than 38 C (100.4 F)  zinc oxide 40%/lanolin Ointment 1 Application(s) Topical every 3 hours PRN diarrhea episodes      Vital Signs Last 24 Hrs  T(C): 36.8 (07 Sep 2017 11:34), Max: 37.4 (06 Sep 2017 13:17)  T(F): 98.3 (07 Sep 2017 11:34), Max: 99.4 (06 Sep 2017 13:17)  HR: 73 (07 Sep 2017 11:34) (70 - 80)  BP: 137/35 (07 Sep 2017 11:34) (127/58 - 153/40)  BP(mean): --  RR: 18 (07 Sep 2017 11:34) (16 - 18)  SpO2: 95% (07 Sep 2017 11:34) (95% - 100%)    Physical Exam:        Constitutional: frail looking  HEENT: NC/AT, EOMI, PERRLA  Neck: supple  Back: no tenderness  Respiratory: decreased BS at bases  Cardiovascular: S1S2 regular, no murmurs  Abdomen: soft, not tender, not distended, positive BS  Genitourinary: deferred  Rectal: deferred  Musculoskeletal: no muscle tenderness, no joint swelling or tenderness  Extremities: no pedal edema  Neurological: AxOx3, moving all extremities, no focal deficits  Skin: no rashes    Labs:                        8.2    5.9   )-----------( 216      ( 07 Sep 2017 09:08 )             25.2     09-07    150<H>  |  118<H>  |  74<H>  ----------------------------<  99  4.1   |  24  |  1.33<H>    Ca    8.1<L>      07 Sep 2017 09:08                            6.5    x     )-----------( x        ( 06 Sep 2017 10:04 )             20.3     09-06    148<H>  |  116<H>  |  56<H>  ----------------------------<  97  4.6   |  24  |  1.52<H>    Ca    7.6<L>      06 Sep 2017 07:47  Mg     1.9     09-05               Radiology:    < from: Xray Chest 1 View AP/PA. (09.03.17 @ 18:47) >  Stable diffuse reticulonodular opacities likely consistent with pulmonary   vascular congestion. More prominent airspace opacity with air   bronchograms seen projecting over the left ventricular apex. Consider PA   and lateral views or CT of the chest. Follow to resolution.    < end of copied text >      Advanced directives addressed: full resuscitation

## 2017-09-07 NOTE — PROGRESS NOTE ADULT - SUBJECTIVE AND OBJECTIVE BOX
Pt with positive stool for guaiac but no active rectal bleeding. VSS. Repeat hemoglobin 6.9. Will transfuse 1 unit of PRBC. Monitor H/h. GI to see pt in am.

## 2017-09-07 NOTE — PROGRESS NOTE ADULT - ASSESSMENT
83 y/o male with h/o CAD s/p stents (LAD, RCA bare metal around 9/16), PVD (RLE stent/ angioplasty and surgical debridement 5/20, right lower extremity amputation), A.Fib  on coumadin, Hypertension, COPD on home O2 2L, SHAYY with BIPAP, chronic diastolic CHF, Hyperlipidemia, PVD, Iron deficiency anemia, chronic back pain, Gout, BPH, CKD III . 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 Patient has not had any gross blood in stools. In ED, no melena. He is reported with persistent diarrhea.     1. Diarrheal syndrome. CDAD. Lower GI bleeding. Anemia, likely iron deficiency anemia.  -anemia is improved s/p PRBC  -no clinical evidence of toxic megacolon  -contact isolation  -on vancomycin 125 mg PO q6h # 2  -tolerating abx well so far; no side effects noted  -continue abx coverage  -monitor temps  -GI evaluation appreciated  -f/u CBC  -supportive care  2. Other issues:   -care per medicine

## 2017-09-07 NOTE — PROGRESS NOTE ADULT - ASSESSMENT
Rectal bleeding–likely compared rectal superficial skin tears. Associated with elevated INR.      drop in HCT noted, also inc BUN/Cr ratio  received 2 UPRBC s significant inc after 1st unit    bleeding source not clear, start PPI gtt  follow HCT    Discussed with patient endoscopic evaluation but due to his comorbid disease, he would like to avoid and I am agreeable.    Coagulopathy with elevated INR.  repeat INR and follow HCT    A. fib coronary artery disease as well as stents and peripheral arterial disease with DVT and IVC filter. Furthermore, he has upper extremity DVT and anticoagulation will be needed for that.    Multiple small bowel movements daily and pos c diff  vanco qid    DW pt and family, with Abx in future, would empirically treat for c diff      Suggest NPO for now    Dw daughter

## 2017-09-07 NOTE — PROGRESS NOTE ADULT - ASSESSMENT
82 year old male with history of CAD s/p stents (LAD, RCA bare metal around 9/16),PVD (RLE stent/ angioplasty and surgical debridement by Dr Siu 5/20,right lower extremity amputation ) A.Fib  on coumadin, Hypertension, COPD on home O2 2L, SHAYY on history of  nocturnal BIPAP, ex-smoker (smoked 1ppd X 50 years, quit 22 years ago),  Chronic diastolic CHF, Hyperlipidemia, PVD, Iron deficiency anemia, Chronic back pain, Gout, BPH, CKD III . 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 Patient has been in rehab now for 5 months   In ED - no gross blood in stools, no cookie ,  ED physician notes stool guaic positive ,on rectal exam done by ED physician there was some perianal skin tear and brown stools , INR  was 4 , reports chronic diarrhea 10 times a day for which he takes loperamide prn and lactobacillus        # Rectal bleeding, minimal,  doubt acute GI bleed , suspected perirectal superficial skin tear  with supratherapiutic INR  - positive occult blood in ED, FOBT +   - significant drop in H/H  s/p 2 units PRBC 9/7/17  - hold coumadin , heparin sq prophylactic  - monitor for bleeding  - gentle hydration  - drop in Hb to 6.5 --> 8.2  - s/p  PRBC  - GI consult    - last colonoscopy march - hemorrhoids, tics, "mass" - unclear what follow up for mass was  - Dr Cantu - no endoscopic intervention  - h/h q8h stable  - restart asa, d/c plavix     # Acute on chronic diarrhea due to C diff colitis  - PO vanco  - ID consult  - C diff +  - stool cx, o& p  - stop fenofibrate, hydralazine, increase amlodipine for BP control  - minimize polypharmacy    # Coagulapathy with INR 4--> 3.1 --> 2.1--> 1.6  - hold coumadin  due to drop in Hb   - PT/INR daily    # ARYA on CKD stage 3  - s/p  IV hydration  -Cr improving at baseline today  - renal consult    # Hyperchloremic Hypernatremia due to IV  hydration  - adjust IV fluids    # Hypokalemia, mild, resolved  - replace monitor , check Mg    #hx of Afib/CAD /stents, Peripheral arterial disease, DVT/IVC filter  #  Chronic diastolic heart failure  stable  - would need to resume A/C and antiplatelets ASAP if no epsiodes of GI bleed  - cardio consult input noted        #DVT prophylaxis - heparin sq    Dispo - monitor BM, h/h monitoring , hold coumadin

## 2017-09-07 NOTE — PROGRESS NOTE ADULT - SUBJECTIVE AND OBJECTIVE BOX
Patient is a 82y old  Male who presents with a chief complaint of bright blood at rectal area (04 Sep 2017 01:34)      HPI:  82 year old male with history of CAD s/p stents (LAD, RCA bare metal around 9/16),PVD (RLE stent/ angioplasty and surgical debridment by Dr Siu 5/20,right lower extremity amputation ) A.Fib  on coumadin, Hypertension, COPD on home O2 2L, SHAYY onhistory of  nocturnal BIPAP, ex-smoker (smoked 1ppd X 50 years, quit 22 years ago),  Chronic diastolic CHF, Hyperlipidemia, PVD, Iron deficiency anemia, Chronic back pain, Gout, BPH, CKD III . 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  Per ED physician notes stool guaic positive ,on rectal exam done by ED physician there was some perianal skin tear and brown stools ,no gross blood in stools  INR in ED was 4   no active bleeding in ED ,no vitamin K given at this time  CXR no changes from 6/29/2017,no infiltrate or pleural effusion or pulmonary vascular congestion  patient is afebrile and asymptomatic in ED  reports chronic diarrhea 10 times a day for which he takes loperamide prn and lactobacillus      Hx per pt and daughter  neg abd pain  vida diet  has cont loose BM but improving  had abx for PNA recently  had about 8 loose small BM with urgency yesterday, dark per RN          Pmhx- see HPI  Pshx right rotator cuff repair, pilonydal cyst, RLE stent/angioplasty/surgical debridement,amputation,IVC filter  Family hx- rectal ca,HTN,DM  social hx- exsmoker quit 22 years ago,rare etoh use     ROS- patient denies any CP,SOB,abdominal pain,cough,fever,nausea,vomiting,dizziness,palpitations (03 Sep 2017 23:52)      PAST MEDICAL & SURGICAL HISTORY:  Diastolic CHF  Peripheral vascular disease  Afib  Anemia  CKD (chronic kidney disease)  COPD (chronic obstructive pulmonary disease)  SHAYY (obstructive sleep apnea)  Sepsis, due to unspecified organism: 2/2 poorly healing wounds b/l  Dyspepsia: On moderate exertion.  Sleep apnea, obstructive: Requires home 02 therapy, and treatment with BIPAP  Atelectasis  Pleural effusion, bilateral  Respiratory failure  Peripheral edema  CRI (chronic renal insufficiency)  Gout  Benign prostatic hypertrophy  Spinal stenosis  Hypercholesterolemia  GERD (gastroesophageal reflux disease)  CAD (coronary artery disease)  Hypertension  S/P angioplasty with stent  Cataract of left eye  Prostate: Surgery green light procedure.  S/P rotator cuff surgery: Right  S/P angioplasty      MEDICATIONS  (STANDING):  ferrous    sulfate 325 milliGRAM(s) Oral daily  metoprolol 25 milliGRAM(s) Oral two times a day  pramipexole 0.125 milliGRAM(s) Oral daily  pantoprazole    Tablet 40 milliGRAM(s) Oral before breakfast  lactobacillus acidophilus 1 Tablet(s) Oral two times a day with meals  allopurinol 100 milliGRAM(s) Oral daily  buDESOnide   0.5 milliGRAM(s) Respule 0.5 milliGRAM(s) Inhalation every 12 hours  doxazosin 8 milliGRAM(s) Oral at bedtime  isosorbide   mononitrate ER Tablet (IMDUR) 120 milliGRAM(s) Oral daily  gabapentin 300 milliGRAM(s) Oral daily  simvastatin 10 milliGRAM(s) Oral at bedtime  influenza  Vaccine (HIGH DOSE) 0.5 milliLiter(s) IntraMuscular once  aspirin  chewable 81 milliGRAM(s) Oral daily  amLODIPine   Tablet 10 milliGRAM(s) Oral daily  vancomycin    Solution 125 milliGRAM(s) Oral every 6 hours    MEDICATIONS  (PRN):  ALBUTerol/ipratropium for Nebulization 3 milliLiter(s) Nebulizer every 4 hours PRN Shortness of Breath and/or Wheezing  HYDROmorphone   Tablet 2 milliGRAM(s) Oral three times a day PRN Moderate Pain (4 - 6)  acetaminophen   Tablet 500 milliGRAM(s) Oral every 4 hours PRN For Temp greater than 38 C (100.4 F)  zinc oxide 40%/lanolin Ointment 1 Application(s) Topical every 3 hours PRN diarrhea episodes      Allergies    No Known Allergies    Intolerances        SOCIAL HISTORY:NC    FAMILY HISTORY:  No pertinent family history in first degree relatives      REVIEW OF SYSTEMS:    CONSTITUTIONAL: No weakness, fevers or chills  EYES/ENT: No visual changes;  No vertigo or throat pain   NECK: No pain or stiffness  RESPIRATORY: No cough, wheezing, hemoptysis; No shortness of breath  CARDIOVASCULAR: No chest pain or palpitations  GENITOURINARY: No dysuria, frequency or hematuria  NEUROLOGICAL: No numbness or weakness  SKIN: No itching, burning, rashes, or lesions   All other review of systems is negative unless indicated above.    Vital Signs Last 24 Hrs  T(C): 36.8 (07 Sep 2017 06:35), Max: 37.4 (06 Sep 2017 13:17)  T(F): 98.2 (07 Sep 2017 06:35), Max: 99.4 (06 Sep 2017 13:17)  HR: 71 (07 Sep 2017 06:35) (71 - 80)  BP: 150/43 (07 Sep 2017 06:35) (125/36 - 153/40)  BP(mean): --  RR: 17 (07 Sep 2017 06:35) (16 - 18)  SpO2: 100% (07 Sep 2017 06:35) (97% - 100%)    PHYSICAL EXAM:    Constitutional: NAD, well-developed  HEENT: EOMI, throat clear  Neck: No LAD, supple  Respiratory: CTA and P  Cardiovascular: S1 and S2, RRR, no M  Gastrointestinal: BS+, soft, NT/ND, neg HSM,  Extremities: No peripheral edema, neg clubing, cyanosis  Vascular: 2+ peripheral pulses  Neurological: A/O x 3, no focal deficits  Psychiatric: Normal mood, normal affect  Skin: No rashes    LABS:  CBC Full  -  ( 07 Sep 2017 00:32 )  WBC Count : x  Hemoglobin : 6.9 g/dL  Hematocrit : 21.6 %  Platelet Count - Automated : x  Mean Cell Volume : x  Mean Cell Hemoglobin : x  Mean Cell Hemoglobin Concentration : x  Auto Neutrophil # : x  Auto Lymphocyte # : x  Auto Monocyte # : x  Auto Eosinophil # : x  Auto Basophil # : x  Auto Neutrophil % : x  Auto Lymphocyte % : x  Auto Monocyte % : x  Auto Eosinophil % : x  Auto Basophil % : x    09-06    148<H>  |  116<H>  |  56<H>  ----------------------------<  97  4.6   |  24  |  1.52<H>    Ca    7.6<L>      06 Sep 2017 07:47      PT/INR - ( 06 Sep 2017 07:47 )   PT: 23.4 sec;   INR: 2.13 ratio                 RADIOLOGY & ADDITIONAL STUDIES:

## 2017-09-07 NOTE — PROGRESS NOTE ADULT - SUBJECTIVE AND OBJECTIVE BOX
CC; [facility]    HPI:  82 year old male with hx of CKD stage 3/4 ( new baseline of 1.6-1.7) presetns after hd was noted to have blood when wiping.  Has been having loose stools that has been contributing to soreness in his rectal region.  + guaic pos in ER.  no documended melena    In the SNF has been receiving EPOgen 20k bimonthly with hgb of 7.5 on 8/28.  Pre hospitalization in earlier part of year, pt has hgb that were 8-9 without epogen.      This AM now with drop in his hgb and receiving PRBC transfusion.  Has noted with hgb of 7 this am and repeat value of 6.5 and 2 units of prbc transfusion planned.     Noted with CDiff pos this am and PO vanc started.  Has been having diarhea for the past couple of days and given loperamide and bactobacillus    HAs maintained on lasix 40 qd at SNF    9/7  daughter at bedside   feels well  + C dif on po vanco   daughter questioning the Lasix use  got 1 u prbc last night for hgb 6.9    Family hx- rectal ca,HTN,DM  social hx- exsmoker quit 22 years ago,rare etoh use, currently in SNF at [facility]      Patient is a 82y old  Male who presents with a chief complaint of bright blood at rectal area (04 Sep 2017 01:34)    PAST MEDICAL & SURGICAL HISTORY:  - aniceto on cpap   - CKD stage 3 ( scr 2- pre-amputation) , now closer to 1.6-1.7  - chf diastolic   - afib on ac  - DM2  - GI bleed with hx of Dieulafoy clipped in past   - dvt s/p ivc filter  - BPH s/p green light procedure  -gout  - HTN  - cad s/p PCI  (LAD, RCA bare metal around 9/16)  - COPD with O2  - spinal stenosis  - HLD  - GERD  - PAD /PVD s/p rt aka 6/28/17  - s/p rotator cuff tear  -  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,    Allergies and Intolerances:        Allergies:  	No Known Allergies:     Home Medications:   * Patient Currently Takes Medications as of 03-Sep-2017 23:18 documented in Prescription Writer  · 	loperamide 2 mg oral capsule: 1 cap(s) orally every 12 hours, As Needed, Last Dose Taken:    · 	lactobacillus acidophilus oral capsule: 1 tab(s) orally 2 times a day, Last Dose Taken:    · 	pantoprazole 40 mg oral delayed release tablet: 1 tab(s) orally once a day (before a meal)  · 	hydrALAZINE 50 mg oral tablet: 1 tab(s) orally once a day  · 	gabapentin 300 mg oral capsule: 1 cap(s) orally once a day  · 	fenofibrate 145 mg oral tablet: 1 tab(s) orally once a day  · 	simvastatin 10 mg oral tablet: 1 tab(s) orally once a day (at bedtime)  · 	allopurinol 100 mg oral tablet: 1 tab(s) orally once a day  · 	pramipexole 0.125 mg oral tablet: 1 tab(s) orally once a day  · 	aspirin 81 mg oral tablet, chewable: 1 tab(s) orally once a day  · 	metoprolol tartrate 25 mg oral tablet: 1 tab(s) orally 2 times a day  · 	albuterol 90 mcg/inh inhalation aerosol: 2 puff(s) inhaled every 6 hours, As needed, Shortness of Breath  · 	amLODIPine 5 mg oral tablet: 1 tab(s) orally once a day  · 	ammonium lactate 12% topical lotion: 1 application topically once a day  · 	furosemide 40 mg oral tablet: 1 tab(s) orally once a day  · 	acetaminophen 500 mg oral tablet: 1 tab(s) orally every 6 hours, As needed, Mild Pain (1 - 3)  · 	HYDROmorphone 2 mg oral tablet: 1 tab(s) orally 3 times a day, As needed, Moderate Pain (4 - 6)  · 	doxazosin 8 mg oral tablet: 1 tab(s) orally once a day (at bedtime)  · 	isosorbide mononitrate 120 mg oral tablet, extended release: 1 tab(s) orally once a day  · 	Coumadin 3 mg oral tablet: 1 tab(s) orally once a day (at bedtime), Last Dose Taken:    · 	potassium chloride 20 mEq oral granule, extended release: 1 tab(s) orally once a day, Last Dose Taken:    · 	Procrit 20,000 units/mL injectable solution: 1 milliliter(s) injectable 2 times a month, Last Dose Taken:    · 	ferrous sulfate 325 mg (65 mg elemental iron) oral delayed release tablet: 1 tab(s) orally once a day, Last Dose Taken:    · 	budesonide 0.5 mg/2 mL inhalation suspension: 2 milliliter(s) inhaled 2 times a day, As Needed  · 	Vitamin D3 2000 intl units oral capsule: 1 cap(s) orally once a day  · 	Chondroitin-Glucosamine 200 mg-250 mg oral capsule: 1 cap(s) orally 2 times a day  · 	Therapeutic Multiple Vitamins oral tablet: 1 tab(s) orally once a day, Last Dose Taken:       MEDICATIONS  (STANDING):  ferrous    sulfate 325 milliGRAM(s) Oral daily  metoprolol 25 milliGRAM(s) Oral two times a day  pramipexole 0.125 milliGRAM(s) Oral daily  pantoprazole    Tablet 40 milliGRAM(s) Oral before breakfast  lactobacillus acidophilus 1 Tablet(s) Oral two times a day with meals  allopurinol 100 milliGRAM(s) Oral daily  buDESOnide   0.5 milliGRAM(s) Respule 0.5 milliGRAM(s) Inhalation every 12 hours  doxazosin 8 milliGRAM(s) Oral at bedtime  isosorbide   mononitrate ER Tablet (IMDUR) 120 milliGRAM(s) Oral daily  gabapentin 300 milliGRAM(s) Oral daily  simvastatin 10 milliGRAM(s) Oral at bedtime  influenza  Vaccine (HIGH DOSE) 0.5 milliLiter(s) IntraMuscular once  aspirin  chewable 81 milliGRAM(s) Oral daily  dextrose 5% + sodium chloride 0.45% with potassium chloride 20 mEq/L 1000 milliLiter(s) (50 mL/Hr) IV Continuous <Continuous>  amLODIPine   Tablet 10 milliGRAM(s) Oral daily  vancomycin    Solution 125 milliGRAM(s) Oral every 6 hours    MEDICATIONS  (PRN):  ALBUTerol/ipratropium for Nebulization 3 milliLiter(s) Nebulizer every 4 hours PRN Shortness of Breath and/or Wheezing  HYDROmorphone   Tablet 2 milliGRAM(s) Oral three times a day PRN Moderate Pain (4 - 6)  loperamide 2 milliGRAM(s) Oral every 12 hours PRN Diarrhea  acetaminophen   Tablet 500 milliGRAM(s) Oral every 4 hours PRN For Temp greater than 38 C (100.4 F)  zinc oxide 40%/lanolin Ointment 1 Application(s) Topical every 3 hours PRN diarrhea episodes      Allergies    No Known Allergies    Intolerances        I&O's Summary    05 Sep 2017 07:01  -  06 Sep 2017 07:00  --------------------------------------------------------  IN: 0 mL / OUT: 100 mL / NET: -100 mL    06 Sep 2017 07:01  -  06 Sep 2017 13:21  --------------------------------------------------------  IN: 120 mL / OUT: 0 mL / NET: 120 mL      Vital Signs Last 24 Hrs  T(C): 37.4 (06 Sep 2017 13:17), Max: 37.4 (06 Sep 2017 13:17)  T(F): 99.4 (06 Sep 2017 13:17), Max: 99.4 (06 Sep 2017 13:17)  HR: 80 (06 Sep 2017 13:17) (70 - 84)  BP: 136/35 (06 Sep 2017 13:17) (125/36 - 140/85)  BP(mean): --  RR: 16 (06 Sep 2017 13:17) (15 - 18)  SpO2: 99% (06 Sep 2017 13:17) (97% - 100%)  Daily     Daily   I&O's Summary    05 Sep 2017 07:01  -  06 Sep 2017 07:00  --------------------------------------------------------  IN: 0 mL / OUT: 100 mL / NET: -100 mL    06 Sep 2017 07:01  -  06 Sep 2017 13:21  --------------------------------------------------------  IN: 120 mL / OUT: 0 mL / NET: 120 mL        PHYSICAL EXAM:  GEN: alert awake O X 3  HEENT: MMM  NECK supple no jvd  CV: IRRR s1s2  LUNGS: b/l CTA  ABD: + soft,   EXT: no edema, rt aka    LABS:                                   6.9    x     )-----------( x        ( 07 Sep 2017 00:32 )             21.6     09-06    148<H>  |  116<H>  |  56<H>  ----------------------------<  97  4.6   |  24  |  1.52<H>    Ca    7.6<L>      06 Sep 2017 07:47

## 2017-09-07 NOTE — PROVIDER CONTACT NOTE (OTHER) - ASSESSMENT
H&H 7.3 and 22.6  Patient is asymptomatic at this time and vital signs are stable.  Patient has had 2 units PRBC this admission

## 2017-09-07 NOTE — PROGRESS NOTE ADULT - ASSESSMENT
81 y/o with hx of CKD stage 3/4 with recent new baseline scr of 1.6-1.7 presents with [facility] with hx of GI bleed.  Now with worsening anemia and being evaluated for Acute blood loss anemia.  Pt has been receiving epogen and po Iron as outpt at [facility]    PLAN  - dc IVF, pt at his baseline renal function and is usually with mild hypernatremia  - hold lasix for now and will moniter when need to start this  - transfusion as per hospitalist/ GI  - will hold off on epogen for now, get iron studies, ferritin, retic etc.  Pt has a hx of chronically low hgb (8-9) recently and was attributed to GI loss        9/7  daughter at bedside   feels well  + C dif on po vanco   daughter questioning the Lasix use  got 1 u prbc last night for hgb 6.9  d/w Pts daughter, labs pending

## 2017-09-07 NOTE — PROGRESS NOTE ADULT - SUBJECTIVE AND OBJECTIVE BOX
Subjective:  Patient is a 82y old  Male who presents with a chief complaint of bright blood at rectal area    HPI:         82 year old male with history of CAD s/p stents (LAD, RCA bare metal around ),PVD (RLE stent/ angioplasty and surgical debridement by Dr Siu ,right lower extremity amputation ) A.Fib  on coumadin, Hypertension, COPD on home O2 2L, SHAYY on history of  nocturnal BIPAP, ex-smoker (smoked 1ppd X 50 years, quit 22 years ago),  Chronic diastolic CHF, Hyperlipidemia, PVD, Iron deficiency anemia, Chronic back pain, Gout, BPH, CKD III . 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 Patient has been in rehab now for 5 months   In ED - no gross blood in stools, no cookie ,  ED physician notes stool guaic positive ,on rectal exam done by ED physician there was some perianal skin tear and brown stools , INR  was 4 , reports chronic diarrhea 10 times a day for which he takes loperamide prn and lactobacillus    17 - Patient seen and examined at bedside earlier today, no rectal bleeding, denies CP, palpitations,, dyspnea, abd pain  17 pt seen and examined at bedside earlier today, denies rectal bleeding, + lose BM , reports h/o C diff, denies dyspnea, palpitations, abd pain  17 - events noted, + C diff, pt on isolation, reports multiple lose BM, buttock pain from irritation, denies other sx  17 events noted , s/p 2 units PRBC overnight, denies abd pain, dyspnea, CP, palpitations. afebrile    Review of system- Rest of the review of system are negative except mentioned in HPI    OBJECTIVE:   T(C): 36.8 (17 @ 11:34), Max: 37 (17 @ 02:50)  T(F): 98.3 (17 @ 11:34), Max: 98.6 (17 @ 02:50)  HR: 73 (17 @ 11:34) (70 - 79)  BP: 137/35 (17 @ 11:34) (137/35 - 153/40)  RR: 18 (17 @ 11:34) (16 - 18)  SpO2: 95% (17 @ 11:34) (95% - 100%)  Wt(kg): --Daily Height in cm: 165.1 (03 Sep 2017 17:38)    Daily Weight in k.5 (04 Sep 2017 01:34)    PHYSICAL EXAM:  GENERAL: NAD  NERVOUS SYSTEM:  Alert & Oriented X3, non- focal exam  HEAD:  Atraumatic, Normocephalic  EYES: EOMI, PERRLA, conjunctiva and sclera clear  HEENT: Moist mucous membranes  NECK: Supple, No JVD  CHEST/LUNG: Clear to auscultation bilaterally; No rales, no rhonchi, no wheezing, or rubs  HEART: Regular rate and rhythm; No murmurs, rubs, or gallops  ABDOMEN: Soft, Nontender, Nondistended; Bowel sounds present  GENITOURINARY- Voiding, no suprapubic tenderness  EXTREMITIES:  - chronic LLE edema 1+, RLE amputation  MUSCULOSKELETAL: No muscle tenderness, Muscle tone normal, No joint tenderness, no Joint swelling, Joint range of motion-normal  SKIN-no rash, no lesion    LABS:      148<H>  |  116<H>  |  56<H>  ----------------------------<  97  4.6   |  24  |  1.52<H>    Ca    7.6<L>      06 Sep 2017 07:47  Mg     1.9                             6.5    x     )-----------( x        ( 06 Sep 2017 10:04 )             20.3       PT/INR - ( 06 Sep 2017 07:47 )   PT: 23.4 sec;   INR: 2.13 ratio             146<H>  |  114<H>  |  33<H>  ----------------------------<  89  3.9   |  26  |  1.39<H>    Ca    8.2<L>      05 Sep 2017 06:49  Phos  2.9     09-04  Mg     1.9     09-05    TPro  5.1<L>  /  Alb  2.1<L>  /  TBili  0.2  /  DBili  x   /  AST  18  /  ALT  8<L>  /  AlkPhos  42  09-04                            8.4    7.1   )-----------( 267      ( 05 Sep 2017 06:49 )             26.5       LIVER FUNCTIONS - ( 04 Sep 2017 07:29 )  Alb: 2.1 g/dL / Pro: 5.1 gm/dL / ALK PHOS: 42 U/L / ALT: 8 U/L / AST: 18 U/L / GGT: x             PT/INR - ( 05 Sep 2017 06:49 )   PT: 35.3 sec;   INR: 3.19 ratio         PTT - ( 04 Sep 2017 07:29 )  PTT:52.8 sec    Urinalysis Basic - ( 03 Sep 2017 20:28 )    Color: Yellow / Appearance: Clear / S.015 / pH: x  Gluc: x / Ketone: Negative  / Bili: Negative / Urobili: Negative mg/dL   Blood: x / Protein: 100 mg/dL / Nitrite: Negative   Leuk Esterase: Trace / RBC: 0-2 /HPF / WBC 6-10   Sq Epi: x / Non Sq Epi: Few / Bacteria: Moderate                            8.5    8.7   )-----------( 287      ( 04 Sep 2017 07:29 )             27.1     09-04    148<H>  |  115<H>  |  33<H>  ----------------------------<  88  3.4<L>   |  27  |  1.49<H>    Ca    8.2<L>      04 Sep 2017 07:29  Phos  2.9     09-04  Mg     1.9     -04    TPro  5.1<L>  /  Alb  2.1<L>  /  TBili  0.2  /  DBili  x   /  AST  18  /  ALT  8<L>  /  AlkPhos  42  09-04    PT/INR - ( 04 Sep 2017 07:29 )   PT: 45.1 sec;   INR: 4.06 ratio         PTT - ( 04 Sep 2017 07:29 )  PTT:52.8 sec      Urinalysis Basic - ( 03 Sep 2017 20:28 )    Color: Yellow / Appearance: Clear / S.015 / pH: x  Gluc: x / Ketone: Negative  / Bili: Negative / Urobili: Negative mg/dL   Blood: x / Protein: 100 mg/dL / Nitrite: Negative   Leuk Esterase: Trace / RBC: 0-2 /HPF / WBC 6-10   Sq Epi: x / Non Sq Epi: Few / Bacteria: Moderate    CAPILLARY BLOOD GLUCOSE    RECENT CULTURES:  Clostridium difficile Toxin by PCR (17 @ 12:30)    C Diff by PCR Result: Detected    Clostridium difficile Toxin by PCR: The results of this test should be interpreted with consideration of all  clinical and laboratory findings. This test determines the presence of  the C. difficile tcdB gene at a given time and is not intended to  identify antibiotic associated disease or C. difficile infection without  clinical context. Successful treatment is based on the resolution of  clinical symptoms. This test should not be used as a "test of cure"  because C. difficile DNA will persist after successful treatment. Repeat  testing will not be permitted.    This test is performed on the BD MAX system using Real-Time PCR and  fluorogenic target-specific hybridization.      Culture - Urine (17 @ 20:28)    Specimen Source: .Urine None    Culture Results:   No growth      RADIOLOGY & ADDITIONAL TESTS:    < from: Xray Chest 1 View AP/PA. (17 @ 18:47) >  Stable diffuse reticulonodular opacities likely consistent with pulmonary   vascular congestion. More prominent airspace opacity with air   bronchograms seen projecting over the left ventricular apex. Consider PA   and lateral views or CT of the chest. Follow to resolution.      < end of copied text >    Current medications:  ferrous    sulfate 325 milliGRAM(s) Oral daily  metoprolol 25 milliGRAM(s) Oral two times a day  pramipexole 0.125 milliGRAM(s) Oral daily  pantoprazole    Tablet 40 milliGRAM(s) Oral before breakfast  hydrALAZINE 50 milliGRAM(s) Oral daily  ALBUTerol/ipratropium for Nebulization 3 milliLiter(s) Nebulizer every 4 hours PRN  amLODIPine   Tablet 5 milliGRAM(s) Oral daily  HYDROmorphone   Tablet 2 milliGRAM(s) Oral three times a day PRN  lactobacillus acidophilus 1 Tablet(s) Oral two times a day with meals  loperamide 2 milliGRAM(s) Oral every 12 hours PRN  allopurinol 100 milliGRAM(s) Oral daily  acetaminophen   Tablet 500 milliGRAM(s) Oral every 4 hours PRN  buDESOnide   0.5 milliGRAM(s) Respule 0.5 milliGRAM(s) Inhalation every 12 hours  doxazosin 8 milliGRAM(s) Oral at bedtime  isosorbide   mononitrate ER Tablet (IMDUR) 120 milliGRAM(s) Oral daily  gabapentin 300 milliGRAM(s) Oral daily  simvastatin 10 milliGRAM(s) Oral at bedtime  fenofibrate Tablet 145 milliGRAM(s) Oral daily  sodium chloride 0.45%. 1000 milliLiter(s) IV Continuous <Continuous>  influenza  Vaccine (HIGH DOSE) 0.5 milliLiter(s) IntraMuscular once

## 2017-09-08 LAB
ANION GAP SERPL CALC-SCNC: 6 MMOL/L — SIGNIFICANT CHANGE UP (ref 5–17)
BUN SERPL-MCNC: 68 MG/DL — HIGH (ref 7–23)
CALCIUM SERPL-MCNC: 8.1 MG/DL — LOW (ref 8.5–10.1)
CHLORIDE SERPL-SCNC: 117 MMOL/L — HIGH (ref 96–108)
CO2 SERPL-SCNC: 25 MMOL/L — SIGNIFICANT CHANGE UP (ref 22–31)
CREAT SERPL-MCNC: 1.42 MG/DL — HIGH (ref 0.5–1.3)
EPO SERPL-MCNC: 29.6 MIU/ML — HIGH (ref 2.6–18.5)
GLUCOSE SERPL-MCNC: 100 MG/DL — HIGH (ref 70–99)
HCT VFR BLD CALC: 22.7 % — LOW (ref 39–50)
HCT VFR BLD CALC: 24 % — LOW (ref 39–50)
HCT VFR BLD CALC: 24.8 % — LOW (ref 39–50)
HGB BLD-MCNC: 7.3 G/DL — LOW (ref 13–17)
HGB BLD-MCNC: 8.1 G/DL — LOW (ref 13–17)
INR BLD: 1.81 RATIO — HIGH (ref 0.88–1.16)
MCHC RBC-ENTMCNC: 30.8 PG — SIGNIFICANT CHANGE UP (ref 27–34)
MCHC RBC-ENTMCNC: 32.3 GM/DL — SIGNIFICANT CHANGE UP (ref 32–36)
MCV RBC AUTO: 95.2 FL — SIGNIFICANT CHANGE UP (ref 80–100)
PLATELET # BLD AUTO: 208 K/UL — SIGNIFICANT CHANGE UP (ref 150–400)
POTASSIUM SERPL-MCNC: 3.9 MMOL/L — SIGNIFICANT CHANGE UP (ref 3.5–5.3)
POTASSIUM SERPL-SCNC: 3.9 MMOL/L — SIGNIFICANT CHANGE UP (ref 3.5–5.3)
PROTHROM AB SERPL-ACNC: 19.8 SEC — HIGH (ref 9.8–12.7)
RBC # BLD: 2.39 M/UL — LOW (ref 4.2–5.8)
RBC # FLD: 15.4 % — HIGH (ref 10.3–14.5)
SODIUM SERPL-SCNC: 148 MMOL/L — HIGH (ref 135–145)
WBC # BLD: 4.6 K/UL — SIGNIFICANT CHANGE UP (ref 3.8–10.5)
WBC # FLD AUTO: 4.6 K/UL — SIGNIFICANT CHANGE UP (ref 3.8–10.5)

## 2017-09-08 RX ORDER — FUROSEMIDE 40 MG
40 TABLET ORAL ONCE
Qty: 0 | Refills: 0 | Status: COMPLETED | OUTPATIENT
Start: 2017-09-08 | End: 2017-09-08

## 2017-09-08 RX ADMIN — Medication 125 MILLIGRAM(S): at 11:30

## 2017-09-08 RX ADMIN — HEPARIN SODIUM 5000 UNIT(S): 5000 INJECTION INTRAVENOUS; SUBCUTANEOUS at 05:58

## 2017-09-08 RX ADMIN — Medication 40 MILLIGRAM(S): at 15:22

## 2017-09-08 RX ADMIN — ISOSORBIDE MONONITRATE 120 MILLIGRAM(S): 60 TABLET, EXTENDED RELEASE ORAL at 11:29

## 2017-09-08 RX ADMIN — SIMVASTATIN 10 MILLIGRAM(S): 20 TABLET, FILM COATED ORAL at 21:45

## 2017-09-08 RX ADMIN — Medication 25 MILLIGRAM(S): at 05:57

## 2017-09-08 RX ADMIN — PRAMIPEXOLE DIHYDROCHLORIDE 0.12 MILLIGRAM(S): 0.12 TABLET ORAL at 11:30

## 2017-09-08 RX ADMIN — Medication 8 MILLIGRAM(S): at 21:45

## 2017-09-08 RX ADMIN — Medication 100 MILLIGRAM(S): at 11:30

## 2017-09-08 RX ADMIN — Medication 125 MILLIGRAM(S): at 07:02

## 2017-09-08 RX ADMIN — PANTOPRAZOLE SODIUM 10 MG/HR: 20 TABLET, DELAYED RELEASE ORAL at 18:45

## 2017-09-08 RX ADMIN — GABAPENTIN 300 MILLIGRAM(S): 400 CAPSULE ORAL at 11:30

## 2017-09-08 RX ADMIN — Medication 125 MILLIGRAM(S): at 01:02

## 2017-09-08 RX ADMIN — Medication 0.5 MILLIGRAM(S): at 07:48

## 2017-09-08 RX ADMIN — Medication 81 MILLIGRAM(S): at 11:30

## 2017-09-08 RX ADMIN — Medication 125 MILLIGRAM(S): at 17:53

## 2017-09-08 RX ADMIN — Medication 325 MILLIGRAM(S): at 11:30

## 2017-09-08 RX ADMIN — Medication 0.5 MILLIGRAM(S): at 20:11

## 2017-09-08 RX ADMIN — Medication 1 TABLET(S): at 17:53

## 2017-09-08 RX ADMIN — AMLODIPINE BESYLATE 10 MILLIGRAM(S): 2.5 TABLET ORAL at 05:57

## 2017-09-08 RX ADMIN — Medication 1 TABLET(S): at 08:50

## 2017-09-08 RX ADMIN — Medication 25 MILLIGRAM(S): at 17:53

## 2017-09-08 RX ADMIN — PANTOPRAZOLE SODIUM 10 MG/HR: 20 TABLET, DELAYED RELEASE ORAL at 05:57

## 2017-09-08 NOTE — PROGRESS NOTE ADULT - ASSESSMENT
Rectal bleeding–likely  rectal superficial skin tears. Associated with elevated INR and diarrhea, not recurred      drop in HCT noted, also inc BUN/Cr ratio  received 2 UPRBC and dec again    bleeding source not clear, cont PPI gtt  follow HCT    Discussed with patient endoscopic evaluation but due to his comorbid disease,  ppatient and daughter are considering this.    Please transfuse to keep hemoglobin above 8, serial hematocrits,    I discussed with them need for anticoagulation again and they are considering possible push enteroscopy.  Alternatives benefits and risks reviewed.  Please obtain pulmonary consult for optimization for now.  Coagulopathy with elevated INR.  repeat INR improved    A. fib coronary artery disease as well as stents and peripheral arterial disease with DVT and IVC filter. Furthermore, he has upper extremity DVT and anticoagulation will be needed for that.    Multiple small bowel movements daily and pos c diff  vanco qid    DW pt and family, with Abx in future, would empirically treat for c diff      Suggest NPO for now    Dw daughter

## 2017-09-08 NOTE — PROGRESS NOTE ADULT - SUBJECTIVE AND OBJECTIVE BOX
Patient is a 82y old  Male who presents with a chief complaint of bright blood at rectal area (04 Sep 2017 01:34)      HPI:  82 year old male with history of CAD s/p stents (LAD, RCA bare metal around 9/16),PVD (RLE stent/ angioplasty and surgical debridment by Dr Siu 5/20,right lower extremity amputation ) A.Fib  on coumadin, Hypertension, COPD on home O2 2L, SHAYY onhistory of  nocturnal BIPAP, ex-smoker (smoked 1ppd X 50 years, quit 22 years ago),  Chronic diastolic CHF, Hyperlipidemia, PVD, Iron deficiency anemia, Chronic back pain, Gout, BPH, CKD III . 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  Per ED physician notes stool guaic positive ,on rectal exam done by ED physician there was some perianal skin tear and brown stools ,no gross blood in stools  INR in ED was 4   no active bleeding in ED ,no vitamin K given at this time  CXR no changes from 6/29/2017,no infiltrate or pleural effusion or pulmonary vascular congestion  patient is afebrile and asymptomatic in ED  reports chronic diarrhea 10 times a day for which he takes loperamide prn and lactobacillus    Patient is comfortable.  He has noted some improvement in his diarrhea. He states that they decide decreased in number but continues to be loose.  Denies any nausea or vomiting. Denies any fevers.  History per patient and daughter.  Has some mild lower abdominal cramping prior to bowel movement that improved with bowel movement. This is mild. It only lasts short time. 4.    Pmhx- see HPI  Pshx right rotator cuff repair, pilonydal cyst, RLE stent/angioplasty/surgical debridement,amputation,IVC filter  Family hx- rectal ca,HTN,DM  social hx- exsmoker quit 22 years ago,rare etoh use     ROS- patient denies any CP,SOB,abdominal pain,cough,fever,nausea,vomiting,dizziness,palpitations (03 Sep 2017 23:52)      PAST MEDICAL & SURGICAL HISTORY:  Diastolic CHF  Peripheral vascular disease  Afib  Anemia  CKD (chronic kidney disease)  COPD (chronic obstructive pulmonary disease)  SHAYY (obstructive sleep apnea)  Sepsis, due to unspecified organism: 2/2 poorly healing wounds b/l  Dyspepsia: On moderate exertion.  Sleep apnea, obstructive: Requires home 02 therapy, and treatment with BIPAP  Atelectasis  Pleural effusion, bilateral  Respiratory failure  Peripheral edema  CRI (chronic renal insufficiency)  Gout  Benign prostatic hypertrophy  Spinal stenosis  Hypercholesterolemia  GERD (gastroesophageal reflux disease)  CAD (coronary artery disease)  Hypertension  S/P angioplasty with stent  Cataract of left eye  Prostate: Surgery green light procedure.  S/P rotator cuff surgery: Right  S/P angioplasty      MEDICATIONS  (STANDING):  ferrous    sulfate 325 milliGRAM(s) Oral daily  metoprolol 25 milliGRAM(s) Oral two times a day  pramipexole 0.125 milliGRAM(s) Oral daily  lactobacillus acidophilus 1 Tablet(s) Oral two times a day with meals  allopurinol 100 milliGRAM(s) Oral daily  buDESOnide   0.5 milliGRAM(s) Respule 0.5 milliGRAM(s) Inhalation every 12 hours  doxazosin 8 milliGRAM(s) Oral at bedtime  isosorbide   mononitrate ER Tablet (IMDUR) 120 milliGRAM(s) Oral daily  gabapentin 300 milliGRAM(s) Oral daily  simvastatin 10 milliGRAM(s) Oral at bedtime  amLODIPine   Tablet 10 milliGRAM(s) Oral daily  vancomycin    Solution 125 milliGRAM(s) Oral every 6 hours  pantoprazole Infusion 8 mG/Hr (10 mL/Hr) IV Continuous <Continuous>  aspirin  chewable 81 milliGRAM(s) Oral daily    MEDICATIONS  (PRN):  ALBUTerol/ipratropium for Nebulization 3 milliLiter(s) Nebulizer every 4 hours PRN Shortness of Breath and/or Wheezing  HYDROmorphone   Tablet 2 milliGRAM(s) Oral three times a day PRN Moderate Pain (4 - 6)  acetaminophen   Tablet 500 milliGRAM(s) Oral every 4 hours PRN For Temp greater than 38 C (100.4 F)  zinc oxide 40%/lanolin Ointment 1 Application(s) Topical every 3 hours PRN diarrhea episodes      Allergies    Zosyn (Rash)    Intolerances        SOCIAL HISTORY:NC    FAMILY HISTORY:  No pertinent family history in first degree relatives      REVIEW OF SYSTEMS:    CONSTITUTIONAL: No weakness, fevers or chills  EYES/ENT: No visual changes;  No vertigo or throat pain   NECK: No pain or stiffness  RESPIRATORY: No cough, wheezing, hemoptysis; No shortness of breath  CARDIOVASCULAR: No chest pain or palpitations  GENITOURINARY: No dysuria, frequency or hematuria  NEUROLOGICAL: No numbness or weakness  SKIN: No itching, burning, rashes, or lesions   All other review of systems is negative unless indicated above.    Vital Signs Last 24 Hrs  T(C): 36.7 (08 Sep 2017 06:10), Max: 36.8 (07 Sep 2017 11:34)  T(F): 98 (08 Sep 2017 06:10), Max: 98.3 (07 Sep 2017 11:34)  HR: 82 (08 Sep 2017 07:45) (71 - 83)  BP: 138/37 (08 Sep 2017 06:10) (126/36 - 138/37)  BP(mean): --  RR: 17 (08 Sep 2017 06:10) (17 - 18)  SpO2: 100% (08 Sep 2017 06:10) (95% - 100%)    PHYSICAL EXAM:    Constitutional: NAD, well-developed  HEENT: EOMI, throat clear  Neck: No LAD, supple  Respiratory: CTA and P  Cardiovascular: S1 and S2, RRR, no M  Gastrointestinal: BS+, soft, NT/ND, neg HSM,  Extremities: No peripheral edema, neg clubing, cyanosis    Neurological: A/O x 3, no focal deficits  Psychiatric: Normal mood, normal affect  Skin: No rashes    LABS:  CBC Full  -  ( 08 Sep 2017 07:48 )  WBC Count : 4.6 K/uL  Hemoglobin : 7.3 g/dL  Hematocrit : 22.7 %  Platelet Count - Automated : 208 K/uL  Mean Cell Volume : 95.2 fl  Mean Cell Hemoglobin : 30.8 pg  Mean Cell Hemoglobin Concentration : 32.3 gm/dL  Auto Neutrophil # : x  Auto Lymphocyte # : x  Auto Monocyte # : x  Auto Eosinophil # : x  Auto Basophil # : x  Auto Neutrophil % : x  Auto Lymphocyte % : x  Auto Monocyte % : x  Auto Eosinophil % : x  Auto Basophil % : x    09-08    148<H>  |  117<H>  |  68<H>  ----------------------------<  100<H>  3.9   |  25  |  1.42<H>    Ca    8.1<L>      08 Sep 2017 07:48      PT/INR - ( 08 Sep 2017 07:48 )   PT: 19.8 sec;   INR: 1.81 ratio                 RADIOLOGY & ADDITIONAL STUDIES:

## 2017-09-08 NOTE — PROGRESS NOTE ADULT - SUBJECTIVE AND OBJECTIVE BOX
Cardiology Consultation    HPI: 82 year old male with history of CAD s/p stents (LAD, RCA bare metal around ),PVD (RLE stent/ angioplasty and surgical debridment by Dr Siu ,right lower extremity amputation ) A.Fib  on coumadin, Hypertension, COPD on home O2 2L, SHAYY onhistory of  nocturnal BIPAP, ex-smoker (smoked 1ppd X 50 years, quit 22 years ago),  Chronic diastolic CHF, Hyperlipidemia, PVD, Iron deficiency anemia, Chronic back pain, Gout, BPH, CKD III . Hx of  GI bleeding- recurrent,  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,sent from NH for minimal red blood stain on diaper .Patient has had hx of upper GI bleed in stomach requiring endoscopic clamping and cauterization in past few months ago.    Pt today denies any more gi bleed.  He denies any CP or SOB.         Pmhx- see HPI  Pshx right rotator cuff repair, pilonydal cyst, RLE stent/angioplasty/surgical debridement,amputation,IVC filter  Family hx- rectal ca,HTN,DM  social hx- exsmoker quit 22 years ago,rare etoh use     PAST MEDICAL & SURGICAL HISTORY:  Diastolic CHF  Peripheral vascular disease  Afib  Anemia  CKD (chronic kidney disease)  COPD (chronic obstructive pulmonary disease)  SHAYY (obstructive sleep apnea)  Sepsis, due to unspecified organism: 2/2 poorly healing wounds b/l  Dyspepsia: On moderate exertion.  Sleep apnea, obstructive: Requires home 02 therapy, and treatment with BIPAP  Atelectasis  Pleural effusion, bilateral  Respiratory failure  Peripheral edema  CRI (chronic renal insufficiency)  Gout  Benign prostatic hypertrophy  Spinal stenosis  Hypercholesterolemia  GERD (gastroesophageal reflux disease)  CAD (coronary artery disease)  Hypertension  S/P angioplasty with stent  Cataract of left eye  Prostate: Surgery green light procedure.  S/P rotator cuff surgery: Right  S/P angioplasty    Allergies  No Known Allergies    SOCIAL HISTORY: Denies tobacco, etoh abuse or illicit drug use    FAMILY HISTORY: No pertinent family history in first degree relatives    MEDICATIONS  (STANDING):  ferrous    sulfate 325 milliGRAM(s) Oral daily  metoprolol 25 milliGRAM(s) Oral two times a day  pramipexole 0.125 milliGRAM(s) Oral daily  pantoprazole    Tablet 40 milliGRAM(s) Oral before breakfast  hydrALAZINE 50 milliGRAM(s) Oral daily  amLODIPine   Tablet 5 milliGRAM(s) Oral daily  lactobacillus acidophilus 1 Tablet(s) Oral two times a day with meals  allopurinol 100 milliGRAM(s) Oral daily  buDESOnide   0.5 milliGRAM(s) Respule 0.5 milliGRAM(s) Inhalation every 12 hours  doxazosin 8 milliGRAM(s) Oral at bedtime  isosorbide   mononitrate ER Tablet (IMDUR) 120 milliGRAM(s) Oral daily  gabapentin 300 milliGRAM(s) Oral daily  simvastatin 10 milliGRAM(s) Oral at bedtime  fenofibrate Tablet 145 milliGRAM(s) Oral daily  sodium chloride 0.45%. 1000 milliLiter(s) (100 mL/Hr) IV Continuous <Continuous>  influenza  Vaccine (HIGH DOSE) 0.5 milliLiter(s) IntraMuscular once    MEDICATIONS  (PRN):  ALBUTerol/ipratropium for Nebulization 3 milliLiter(s) Nebulizer every 4 hours PRN Shortness of Breath and/or Wheezing  HYDROmorphone   Tablet 2 milliGRAM(s) Oral three times a day PRN Moderate Pain (4 - 6)  loperamide 2 milliGRAM(s) Oral every 12 hours PRN Diarrhea  acetaminophen   Tablet 500 milliGRAM(s) Oral every 4 hours PRN For Temp greater than 38 C (100.4 F)    Vital Signs Last 24 Hrs  T(C): 36.3 (04 Sep 2017 05:30), Max: 37.3 (03 Sep 2017 17:38)  T(F): 97.4 (04 Sep 2017 05:30), Max: 99.1 (03 Sep 2017 17:38)  HR: 66 (04 Sep 2017 08:00) (66 - 100)  BP: 168/44 (04 Sep 2017 05:30) (154/44 - 174/70)  BP(mean): --  RR: 18 (04 Sep 2017 05:30) (16 - 20)  SpO2: 97% (04 Sep 2017 05:30) (97% - 100%)    REVIEW OF SYSTEMS:    CONSTITUTIONAL:  As per HPI.  HEENT:  Eyes:  No diplopia or blurred vision. ENT:  No earache, sore throat or runny nose.  CARDIOVASCULAR:  No pressure, squeezing, strangling, tightness, heaviness or aching about the chest, neck, axilla or epigastrium.  RESPIRATORY:  No cough, shortness of breath, PND or orthopnea.  GASTROINTESTINAL:  No nausea, vomiting or diarrhea.  GENITOURINARY:  No dysuria, frequency or urgency.  MUSCULOSKELETAL:  As per HPI.  NEUROLOGIC:  No paresthesias, fasciculations, seizures or weakness.    PHYSICAL EXAMINATION:    GENERAL APPEARANCE:  Pt. is not currently dyspneic, in no distress. Pt. is alert, oriented, and pleasant.  HEENT:  Pupils are normal and react normally. No icterus. Mucous membranes well colored.  NECK:  Supple. No lymphadenopathy. Jugular venous pressure not elevated. Carotids equal.   HEART:   The cardiac impulse has a normal quality. There are no murmurs, rubs or gallops noted  CHEST:  Chest is clear to auscultation. Normal respiratory effort.  ABDOMEN:  Soft and nontender.   EXTREMITIES:  There is no edema. R AKA.    I&O's Summary    03 Sep 2017 07:01  -  04 Sep 2017 07:00  --------------------------------------------------------  IN: 0 mL / OUT: 100 mL / NET: -100 mL    LABS:                        9.1    10.2  )-----------( 319      ( 03 Sep 2017 18:32 )             27.9     09-04    148<H>  |  114<H>  |  35<H>  ----------------------------<  116<H>  3.5   |  26  |  1.60<H>    Ca    7.7<L>      04 Sep 2017 01:48    TPro  5.5<L>  /  Alb  2.3<L>  /  TBili  0.3  /  DBili  x   /  AST  18  /  ALT  8<L>  /  AlkPhos  45  09-03    LIVER FUNCTIONS - ( 03 Sep 2017 18:32 )  Alb: 2.3 g/dL / Pro: 5.5 gm/dL / ALK PHOS: 45 U/L / ALT: 8 U/L / AST: 18 U/L / GGT: x           PT/INR - ( 03 Sep 2017 18:32 )   PT: 46.4 sec;   INR: 4.17 ratio         PTT - ( 03 Sep 2017 18:32 )  PTT:52.5 sec      Urinalysis Basic - ( 03 Sep 2017 20:28 )    Color: Yellow / Appearance: Clear / S.015 / pH: x  Gluc: x / Ketone: Negative  / Bili: Negative / Urobili: Negative mg/dL   Blood: x / Protein: 100 mg/dL / Nitrite: Negative   Leuk Esterase: Trace / RBC: 0-2 /HPF / WBC 6-10   Sq Epi: x / Non Sq Epi: Few / Bacteria: Moderate    EKG: Normal sinus rhythm  Normal ECG    TELEMETRY: Not on tele.    CARDIAC TESTS: 2Decho- Fibrocalcific changes noted to the mitral valve leaflets with preserved   leaflet excursion.  Moderate (2+) mitral regurgitation is present.   Mild mitral annular calcification is present.   Fibrocalcific changes noted to the aortic valveleaflets with preserved   excursion.  Normal tricuspid valve structure and function.   Mild (1+) tricuspid valve regurgitation is present.   Normal pulmonic valve structure and function.   Trace to Mild pulmonic valvular regurgitation is present.   The left atrium appears mildly dilated.   The left ventricle is normal in size, wall motion and contractility.   Mild concentric left ventricular hypertrophy is present.   Estimated left ventricular ejection fraction is 55-60%.   Normal right atrium.   Normal right ventricle structure and function.   No evidence of pericardial effusion.   No evidence of pleural effusion.    RADIOLOGY & ADDITIONAL STUDIES: pending    ASSESSMENT & PLAN:

## 2017-09-08 NOTE — PROGRESS NOTE ADULT - SUBJECTIVE AND OBJECTIVE BOX
Subjective:  Patient is a 82y old  Male who presents with a chief complaint of bright blood at rectal area    HPI:         82 year old male with history of CAD s/p stents (LAD, RCA bare metal around ),PVD (RLE stent/ angioplasty and surgical debridement by Dr Siu ,right lower extremity amputation ) A.Fib  on coumadin, Hypertension, COPD on home O2 2L, SHAYY on history of  nocturnal BIPAP, ex-smoker (smoked 1ppd X 50 years, quit 22 years ago),  Chronic diastolic CHF, Hyperlipidemia, PVD, Iron deficiency anemia, Chronic back pain, Gout, BPH, CKD III . 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 Patient has been in rehab now for 5 months   In ED - no gross blood in stools, no cookie ,  ED physician notes stool guaic positive ,on rectal exam done by ED physician there was some perianal skin tear and brown stools , INR  was 4 , reports chronic diarrhea 10 times a day for which he takes loperamide prn and lactobacillus    17 - Patient seen and examined at bedside earlier today, no rectal bleeding, denies CP, palpitations,, dyspnea, abd pain  17 pt seen and examined at bedside earlier today, denies rectal bleeding, + lose BM , reports h/o C diff, denies dyspnea, palpitations, abd pain  17 - events noted, + C diff, pt on isolation, reports multiple lose BM, buttock pain from irritation, denies other sx  17 events noted , s/p 2 units PRBC overnight, denies abd pain, dyspnea, CP, palpitations. afebrile  17 continues to drop H/H, 1 unit of PRBC ordered, + melena, denies dyspnea, palpitations, dyspnea, POC discussed    Review of system- Rest of the review of system are negative except mentioned in HPI    OBJECTIVE:   T(C): 36.7 (17 @ 15:25), Max: 36.8 (17 @ 11:26)  T(F): 98 (09-08-17 @ 15:25), Max: 98.3 (17 @ 11:26)  HR: 71 (17 @ 15:25) (69 - 93)  BP: 142/39 (17 @ 15:25) (126/36 - 142/39)  RR: 16 (17 @ 15:25) (16 - 18)  SpO2: 100% (17 @ 15:25) (99% - 100%)  Wt(kg): --  Wt(kg): --Daily Height in cm: 165.1 (03 Sep 2017 17:38)    Daily Weight in k.5 (04 Sep 2017 01:34)    PHYSICAL EXAM:  GENERAL: NAD  NERVOUS SYSTEM:  Alert & Oriented X3, non- focal exam  HEAD:  Atraumatic, Normocephalic  EYES: EOMI, PERRLA, conjunctiva and sclera clear  HEENT: Moist mucous membranes  NECK: Supple, No JVD  CHEST/LUNG: Clear to auscultation bilaterally; No rales, no rhonchi, no wheezing, or rubs  HEART: Regular rate and rhythm; No murmurs, rubs, or gallops  ABDOMEN: Soft, Nontender, Nondistended; Bowel sounds present  GENITOURINARY- Voiding, no suprapubic tenderness  EXTREMITIES:  - chronic LLE edema 1+, RLE amputation  MUSCULOSKELETAL: No muscle tenderness, Muscle tone normal, No joint tenderness, no Joint swelling, Joint range of motion-normal  SKIN-no rash, no lesion    LABS:      148<H>  |  117<H>  |  68<H>  ----------------------------<  100<H>  3.9   |  25  |  1.42<H>    Ca    8.1<L>      08 Sep 2017 07:48                         x      x     )-----------( x        ( 08 Sep 2017 16:07 )             24.0     PT/INR - ( 08 Sep 2017 07:48 )   PT: 19.8 sec;   INR: 1.81 ratio          148<H>  |  116<H>  |  56<H>  ----------------------------<  97  4.6   |  24  |  1.52<H>    Ca    7.6<L>      06 Sep 2017 07:47  Mg     1.9     05                        6.5    x     )-----------( x        ( 06 Sep 2017 10:04 )             20.3       PT/INR - ( 06 Sep 2017 07:47 )   PT: 23.4 sec;   INR: 2.13 ratio             146<H>  |  114<H>  |  33<H>  ----------------------------<  89  3.9   |  26  |  1.39<H>    Ca    8.2<L>      05 Sep 2017 06:49  Phos  2.9       Mg     1.9     05    TPro  5.1<L>  /  Alb  2.1<L>  /  TBili  0.2  /  DBili  x   /  AST  18  /  ALT  8<L>  /  AlkPhos  42  04                            8.4    7.1   )-----------( 267      ( 05 Sep 2017 06:49 )             26.5       LIVER FUNCTIONS - ( 04 Sep 2017 07:29 )  Alb: 2.1 g/dL / Pro: 5.1 gm/dL / ALK PHOS: 42 U/L / ALT: 8 U/L / AST: 18 U/L / GGT: x             PT/INR - ( 05 Sep 2017 06:49 )   PT: 35.3 sec;   INR: 3.19 ratio         PTT - ( 04 Sep 2017 07:29 )  PTT:52.8 sec    Urinalysis Basic - ( 03 Sep 2017 20:28 )    Color: Yellow / Appearance: Clear / S.015 / pH: x  Gluc: x / Ketone: Negative  / Bili: Negative / Urobili: Negative mg/dL   Blood: x / Protein: 100 mg/dL / Nitrite: Negative   Leuk Esterase: Trace / RBC: 0-2 /HPF / WBC 6-10   Sq Epi: x / Non Sq Epi: Few / Bacteria: Moderate                            8.5    8.7   )-----------( 287      ( 04 Sep 2017 07:29 )             27.1   CAPILLARY BLOOD GLUCOSE    RECENT CULTURES:  Clostridium difficile Toxin by PCR (17 @ 12:30)    C Diff by PCR Result: Detected    Clostridium difficile Toxin by PCR: The results of this test should be interpreted with consideration of all  clinical and laboratory findings. This test determines the presence of  the C. difficile tcdB gene at a given time and is not intended to  identify antibiotic associated disease or C. difficile infection without  clinical context. Successful treatment is based on the resolution of  clinical symptoms. This test should not be used as a "test of cure"  because C. difficile DNA will persist after successful treatment. Repeat  testing will not be permitted.    This test is performed on the BD MAX system using Real-Time PCR and  fluorogenic target-specific hybridization.      Culture - Urine (17 @ 20:28)    Specimen Source: .Urine None    Culture Results:   No growth      RADIOLOGY & ADDITIONAL TESTS:      < from: Xray Chest 1 View AP/PA. (17 @ 18:47) >  Stable diffuse reticulonodular opacities likely consistent with pulmonary   vascular congestion. More prominent airspace opacity with air   bronchograms seen projecting over the left ventricular apex. Consider PA   and lateral views or CT of the chest. Follow to resolution.      < end of copied text >    MEDICATIONS  (STANDING):  ferrous    sulfate 325 milliGRAM(s) Oral daily  metoprolol 25 milliGRAM(s) Oral two times a day  pramipexole 0.125 milliGRAM(s) Oral daily  lactobacillus acidophilus 1 Tablet(s) Oral two times a day with meals  allopurinol 100 milliGRAM(s) Oral daily  buDESOnide   0.5 milliGRAM(s) Respule 0.5 milliGRAM(s) Inhalation every 12 hours  doxazosin 8 milliGRAM(s) Oral at bedtime  isosorbide   mononitrate ER Tablet (IMDUR) 120 milliGRAM(s) Oral daily  gabapentin 300 milliGRAM(s) Oral daily  simvastatin 10 milliGRAM(s) Oral at bedtime  amLODIPine   Tablet 10 milliGRAM(s) Oral daily  vancomycin    Solution 125 milliGRAM(s) Oral every 6 hours  pantoprazole Infusion 8 mG/Hr (10 mL/Hr) IV Continuous <Continuous>  aspirin  chewable 81 milliGRAM(s) Oral daily    MEDICATIONS  (PRN):  ALBUTerol/ipratropium for Nebulization 3 milliLiter(s) Nebulizer every 4 hours PRN Shortness of Breath and/or Wheezing  HYDROmorphone   Tablet 2 milliGRAM(s) Oral three times a day PRN Moderate Pain (4 - 6)  acetaminophen   Tablet 500 milliGRAM(s) Oral every 4 hours PRN For Temp greater than 38 C (100.4 F)  zinc oxide 40%/lanolin Ointment 1 Application(s) Topical every 3 hours PRN diarrhea episodes

## 2017-09-08 NOTE — PROGRESS NOTE ADULT - ASSESSMENT
82 year old male with history of CAD s/p stents (LAD, RCA bare metal around 9/16),PVD (RLE stent/ angioplasty and surgical debridement by Dr Siu 5/20,right lower extremity amputation ) A.Fib  on coumadin, Hypertension, COPD on home O2 2L, SHAYY on history of  nocturnal BIPAP, ex-smoker (smoked 1ppd X 50 years, quit 22 years ago),  Chronic diastolic CHF, Hyperlipidemia, PVD, Iron deficiency anemia, Chronic back pain, Gout, BPH, CKD III . 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 Patient has been in rehab now for 5 months   In ED - no gross blood in stools, no cookie ,  ED physician notes stool guaic positive ,on rectal exam done by ED physician there was some perianal skin tear and brown stools , INR  was 4 , reports chronic diarrhea 10 times a day for which he takes loperamide prn and lactobacillus        # Rectal bleeding, source unclear,  acute GI bleed , suspected perirectal superficial skin tear  with supratherapiutic INR  - positive occult blood in ED, FOBT +   - significant drop in H/H  s/p 2 units PRBC 9/7/17, 1 unit of PRBC 9/8/17  - hold coumadin , heparin sq prophylactic  - monitor for bleeding  - gentle hydration  - drop in Hb to 6.5 --> 8.2--> 7.3  - s/p  PRBC  - GI consult    - last colonoscopy march - hemorrhoids, tics, "mass" - unclear what follow up for mass was  - Dr Cantu - - push enteroscopy advised   - h/h q8h monitoring  - restart asa, d/c plavix   - cardiology clearance prior procedure  - pulmonary evaluation for pulmonary status optimization    # Acute on chronic diarrhea due to C diff colitis  - PO vanco  - ID consult  - C diff +  - stool cx, o& p  - stop fenofibrate, hydralazine, increase amlodipine for BP control  - minimize polypharmacy    # Coagulapathy with INR 4--> 3.1 --> 2.1--> 1.6 --> 1.8  - hold coumadin  due to drop in Hb   - PT/INR daily    # ARYA on CKD stage 3  - s/p  IV hydration  -Cr improving at baseline today  - renal consult    # Hyperchloremic Hypernatremia due to IV  hydration  - adjust IV fluids    # Hypokalemia, mild, resolved  - replace monitor , check Mg    #hx of Afib/CAD /stents, Peripheral arterial disease, DVT/IVC filter  #  Chronic diastolic heart failure  stable  - would need to resume A/C and antiplatelets ASAP if no epsiodes of GI bleed  - cardio consult input noted        #DVT prophylaxis - heparin sq    Disp0 - IV PPI, h/h monitoring, GI endoscopic evaluation     Dispo - monitor BM, h/h monitoring , hold coumadin

## 2017-09-08 NOTE — PROGRESS NOTE ADULT - SUBJECTIVE AND OBJECTIVE BOX
NEPHROLOGY INTERVAL HPI/OVERNIGHT EVENTS:  seen earlier today, no c/o planned for transfusion today.  GI input noted        MEDICATIONS  (STANDING):  ferrous    sulfate 325 milliGRAM(s) Oral daily  metoprolol 25 milliGRAM(s) Oral two times a day  pramipexole 0.125 milliGRAM(s) Oral daily  lactobacillus acidophilus 1 Tablet(s) Oral two times a day with meals  allopurinol 100 milliGRAM(s) Oral daily  buDESOnide   0.5 milliGRAM(s) Respule 0.5 milliGRAM(s) Inhalation every 12 hours  doxazosin 8 milliGRAM(s) Oral at bedtime  isosorbide   mononitrate ER Tablet (IMDUR) 120 milliGRAM(s) Oral daily  gabapentin 300 milliGRAM(s) Oral daily  simvastatin 10 milliGRAM(s) Oral at bedtime  amLODIPine   Tablet 10 milliGRAM(s) Oral daily  vancomycin    Solution 125 milliGRAM(s) Oral every 6 hours  pantoprazole Infusion 8 mG/Hr (10 mL/Hr) IV Continuous <Continuous>  aspirin  chewable 81 milliGRAM(s) Oral daily    MEDICATIONS  (PRN):  ALBUTerol/ipratropium for Nebulization 3 milliLiter(s) Nebulizer every 4 hours PRN Shortness of Breath and/or Wheezing  HYDROmorphone   Tablet 2 milliGRAM(s) Oral three times a day PRN Moderate Pain (4 - 6)  acetaminophen   Tablet 500 milliGRAM(s) Oral every 4 hours PRN For Temp greater than 38 C (100.4 F)  zinc oxide 40%/lanolin Ointment 1 Application(s) Topical every 3 hours PRN diarrhea episodes      Allergies    Zosyn (Rash)    Intolerances        I&O's Detail    07 Sep 2017 07:01  -  08 Sep 2017 07:00  --------------------------------------------------------  IN:    IV PiggyBack: 100 mL    Packed Red Blood Cells: 310 mL  Total IN: 410 mL    OUT:    Voided: 100 mL  Total OUT: 100 mL    Total NET: 310 mL      08 Sep 2017 07:01  -  08 Sep 2017 13:48  --------------------------------------------------------  IN:    Oral Fluid: 600 mL    Packed Red Blood Cells: 322 mL  Total IN: 922 mL    OUT:    Voided: 100 mL  Total OUT: 100 mL    Total NET: 822 mL        Vital Signs Last 24 Hrs  T(C): 36.6 (08 Sep 2017 12:15), Max: 36.8 (08 Sep 2017 11:26)  T(F): 97.8 (08 Sep 2017 12:15), Max: 98.3 (08 Sep 2017 11:26)  HR: 71 (08 Sep 2017 12:15) (69 - 93)  BP: 137/32 (08 Sep 2017 12:15) (126/36 - 138/37)  BP(mean): --  RR: 16 (08 Sep 2017 12:15) (16 - 18)  SpO2: 100% (08 Sep 2017 12:15) (99% - 100%)  Daily     Daily     PHYSICAL EXAM:  General: alert. awake Ox3  HEENT: MMM  CV: s1s2 rrr  LUNGS: B/L CTA  EXT: no edema, sacral + edema    LABS:                        7.3    4.6   )-----------( 208      ( 08 Sep 2017 07:48 )             22.7     09-08    148<H>  |  117<H>  |  68<H>  ----------------------------<  100<H>  3.9   |  25  |  1.42<H>    Ca    8.1<L>      08 Sep 2017 07:48      PT/INR - ( 08 Sep 2017 07:48 )   PT: 19.8 sec;   INR: 1.81 ratio

## 2017-09-08 NOTE — PROGRESS NOTE ADULT - SUBJECTIVE AND OBJECTIVE BOX
Patient is a 82y old  Male who presents with a chief complaint of bright blood at rectal area (04 Sep 2017 01:34)    HPI:  81 y/o male with h/o CAD s/p stents (LAD, RCA bare metal around 9/16), PVD (RLE stent/ angioplasty and surgical debridement 5/20, right lower extremity amputation), A.Fib  on coumadin, Hypertension, COPD on home O2 2L, SHAYY with BIPAP, chronic diastolic CHF, Hyperlipidemia, PVD, Iron deficiency anemia, chronic back pain, Gout, BPH, CKD III . 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 Patient has not had any gross blood in stools. In ED, no melena. He is reported with persistent diarrhea.     Loose stools are improving  Feels stronger  No fever or chills    MEDICATIONS  (STANDING):  ferrous    sulfate 325 milliGRAM(s) Oral daily  metoprolol 25 milliGRAM(s) Oral two times a day  pramipexole 0.125 milliGRAM(s) Oral daily  lactobacillus acidophilus 1 Tablet(s) Oral two times a day with meals  allopurinol 100 milliGRAM(s) Oral daily  buDESOnide   0.5 milliGRAM(s) Respule 0.5 milliGRAM(s) Inhalation every 12 hours  doxazosin 8 milliGRAM(s) Oral at bedtime  isosorbide   mononitrate ER Tablet (IMDUR) 120 milliGRAM(s) Oral daily  gabapentin 300 milliGRAM(s) Oral daily  simvastatin 10 milliGRAM(s) Oral at bedtime  amLODIPine   Tablet 10 milliGRAM(s) Oral daily  vancomycin    Solution 125 milliGRAM(s) Oral every 6 hours  pantoprazole Infusion 8 mG/Hr (10 mL/Hr) IV Continuous <Continuous>  aspirin  chewable 81 milliGRAM(s) Oral daily    MEDICATIONS  (PRN):  ALBUTerol/ipratropium for Nebulization 3 milliLiter(s) Nebulizer every 4 hours PRN Shortness of Breath and/or Wheezing  HYDROmorphone   Tablet 2 milliGRAM(s) Oral three times a day PRN Moderate Pain (4 - 6)  acetaminophen   Tablet 500 milliGRAM(s) Oral every 4 hours PRN For Temp greater than 38 C (100.4 F)  zinc oxide 40%/lanolin Ointment 1 Application(s) Topical every 3 hours PRN diarrhea episodes      Vital Signs Last 24 Hrs  T(C): 36.6 (08 Sep 2017 12:15), Max: 36.8 (08 Sep 2017 11:26)  T(F): 97.8 (08 Sep 2017 12:15), Max: 98.3 (08 Sep 2017 11:26)  HR: 71 (08 Sep 2017 12:15) (69 - 93)  BP: 137/32 (08 Sep 2017 12:15) (126/36 - 138/37)  BP(mean): --  RR: 16 (08 Sep 2017 12:15) (16 - 18)  SpO2: 100% (08 Sep 2017 12:15) (99% - 100%)    Physical Exam:        Constitutional: frail looking  HEENT: NC/AT, EOMI, PERRLA  Neck: supple  Back: no tenderness  Respiratory: decreased BS at bases  Cardiovascular: S1S2 regular, no murmurs  Abdomen: soft, not tender, not distended, positive BS  Genitourinary: deferred  Rectal: deferred  Musculoskeletal: no muscle tenderness, no joint swelling or tenderness  Extremities: no pedal edema  Neurological: AxOx3, moving all extremities, no focal deficits  Skin: no rashes    Labs:                        7.3    4.6   )-----------( 208      ( 08 Sep 2017 07:48 )             22.7     09-08    148<H>  |  117<H>  |  68<H>  ----------------------------<  100<H>  3.9   |  25  |  1.42<H>    Ca    8.1<L>      08 Sep 2017 07:48             Cultures:                         8.2    5.9   )-----------( 216      ( 07 Sep 2017 09:08 )             25.2     09-07    150<H>  |  118<H>  |  74<H>  ----------------------------<  99  4.1   |  24  |  1.33<H>    Ca    8.1<L>      07 Sep 2017 09:08                            6.5    x     )-----------( x        ( 06 Sep 2017 10:04 )             20.3     09-06    148<H>  |  116<H>  |  56<H>  ----------------------------<  97  4.6   |  24  |  1.52<H>    Ca    7.6<L>      06 Sep 2017 07:47  Mg     1.9     09-05               Radiology:    < from: Xray Chest 1 View AP/PA. (09.03.17 @ 18:47) >  Stable diffuse reticulonodular opacities likely consistent with pulmonary   vascular congestion. More prominent airspace opacity with air   bronchograms seen projecting over the left ventricular apex. Consider PA   and lateral views or CT of the chest. Follow to resolution.    < end of copied text >      Advanced directives addressed: full resuscitation

## 2017-09-08 NOTE — PROGRESS NOTE ADULT - ASSESSMENT
GI bleed-   Pt had extensive cardiac history, He had recurrent GI bleed and under went EGD in 3/17 which he tolerated well. I discussed with Dr Hernández and he was recommending push enteroscopy that involves similar anaesthesia protocol but slightly longer time than the EGD.  He is optimized from cardiac standpoint to proceed with the procedure.  Pt says that he is thinking about it.  He has extensive CAD and PVD.   I would recommend continuing ASA and coumadin as tolerated.     Atrial fibrillation- continue current meds. Continue couamdin with goal INR around 2-2.5.     CAD- no active CP, continue medical mgmt.      Diastolic HF- appears euvolemic, cont medical mgmt.     DVT proph.     Other medical issues- Management per primary team.   Thank you for allowing me to participate in the care of this patient. Please feel free to contact me with any questions.

## 2017-09-08 NOTE — PROGRESS NOTE ADULT - ASSESSMENT
83 y/o male with h/o CAD s/p stents (LAD, RCA bare metal around 9/16), PVD (RLE stent/ angioplasty and surgical debridement 5/20, right lower extremity amputation), A.Fib  on coumadin, Hypertension, COPD on home O2 2L, SHAYY with BIPAP, chronic diastolic CHF, Hyperlipidemia, PVD, Iron deficiency anemia, chronic back pain, Gout, BPH, CKD III . 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 Patient has not had any gross blood in stools. In ED, no melena. He is reported with persistent diarrhea.     1. Diarrheal syndrome improving. CDAD. Lower GI bleeding. Anemia, likely iron deficiency anemia.  -anemia is improved s/p PRBC  -no clinical evidence of toxic megacolon  -contact isolation  -on vancomycin 125 mg PO q6h # 3  -tolerating abx well so far; no side effects noted  -continue abx coverage  -monitor temps  -GI evaluation appreciated  -f/u CBC  -supportive care  2. Other issues:   -care per medicine

## 2017-09-08 NOTE — PROGRESS NOTE ADULT - ASSESSMENT
83 y/o with hx of CKD stage 3/4 with recent new baseline scr of 1.6-1.7 presents with BRBPR with hx of GI bleed.  Now with worsening anemia and being evaluated for Acute blood loss anemia.  Pt has been receiving epogen and po Iron as outpt at [facility]    PLAN  - dc IVF, pt at his baseline renal function and is usually with mild hypernatremia  - hold lasix for now and will moniter when need to start this  - transfusion as per hospitalist/ GI  - will hold off on epogen for now, get iron studies, ferritin, retic etc.  Pt has a hx of chronically low hgb (8-9) recently and was attributed to GI loss        9/7  daughter at bedside   feels well  + C dif on po vanco   daughter questioning the Lasix use  got 1 u prbc last night for hgb 6.9  d/w Pts daughter, labs pending    9/8 MK  - CKD stage 3 stable with chronic mild hypernatremia, encourage po intake  - GIB; for transfusion today.  and will give lasix iv x1   family updated at bedside.    -

## 2017-09-09 LAB
ANION GAP SERPL CALC-SCNC: 5 MMOL/L — SIGNIFICANT CHANGE UP (ref 5–17)
BUN SERPL-MCNC: 60 MG/DL — HIGH (ref 7–23)
CALCIUM SERPL-MCNC: 8 MG/DL — LOW (ref 8.5–10.1)
CHLORIDE SERPL-SCNC: 117 MMOL/L — HIGH (ref 96–108)
CO2 SERPL-SCNC: 26 MMOL/L — SIGNIFICANT CHANGE UP (ref 22–31)
CREAT SERPL-MCNC: 1.59 MG/DL — HIGH (ref 0.5–1.3)
CULTURE RESULTS: SIGNIFICANT CHANGE UP
GLUCOSE SERPL-MCNC: 103 MG/DL — HIGH (ref 70–99)
HCT VFR BLD CALC: 23.2 % — LOW (ref 39–50)
HCT VFR BLD CALC: 24.6 % — LOW (ref 39–50)
HCT VFR BLD CALC: 25.6 % — LOW (ref 39–50)
HGB BLD-MCNC: 7.7 G/DL — LOW (ref 13–17)
HGB BLD-MCNC: 7.9 G/DL — LOW (ref 13–17)
HGB BLD-MCNC: 8.4 G/DL — LOW (ref 13–17)
MCHC RBC-ENTMCNC: 30.2 PG — SIGNIFICANT CHANGE UP (ref 27–34)
MCHC RBC-ENTMCNC: 32.2 GM/DL — SIGNIFICANT CHANGE UP (ref 32–36)
MCV RBC AUTO: 93.8 FL — SIGNIFICANT CHANGE UP (ref 80–100)
PLATELET # BLD AUTO: 206 K/UL — SIGNIFICANT CHANGE UP (ref 150–400)
POTASSIUM SERPL-MCNC: 3.8 MMOL/L — SIGNIFICANT CHANGE UP (ref 3.5–5.3)
POTASSIUM SERPL-SCNC: 3.8 MMOL/L — SIGNIFICANT CHANGE UP (ref 3.5–5.3)
RBC # BLD: 2.62 M/UL — LOW (ref 4.2–5.8)
RBC # FLD: 15.6 % — HIGH (ref 10.3–14.5)
SODIUM SERPL-SCNC: 148 MMOL/L — HIGH (ref 135–145)
SPECIMEN SOURCE: SIGNIFICANT CHANGE UP
WBC # BLD: 5 K/UL — SIGNIFICANT CHANGE UP (ref 3.8–10.5)
WBC # FLD AUTO: 5 K/UL — SIGNIFICANT CHANGE UP (ref 3.8–10.5)

## 2017-09-09 RX ORDER — SODIUM CHLORIDE 9 MG/ML
1000 INJECTION, SOLUTION INTRAVENOUS
Qty: 0 | Refills: 0 | Status: DISCONTINUED | OUTPATIENT
Start: 2017-09-09 | End: 2017-09-12

## 2017-09-09 RX ADMIN — Medication 1 TABLET(S): at 17:42

## 2017-09-09 RX ADMIN — Medication 25 MILLIGRAM(S): at 17:42

## 2017-09-09 RX ADMIN — Medication 325 MILLIGRAM(S): at 11:38

## 2017-09-09 RX ADMIN — SIMVASTATIN 10 MILLIGRAM(S): 20 TABLET, FILM COATED ORAL at 22:45

## 2017-09-09 RX ADMIN — Medication 125 MILLIGRAM(S): at 17:42

## 2017-09-09 RX ADMIN — Medication 125 MILLIGRAM(S): at 11:40

## 2017-09-09 RX ADMIN — Medication 25 MILLIGRAM(S): at 11:30

## 2017-09-09 RX ADMIN — Medication 100 MILLIGRAM(S): at 11:38

## 2017-09-09 RX ADMIN — ISOSORBIDE MONONITRATE 120 MILLIGRAM(S): 60 TABLET, EXTENDED RELEASE ORAL at 11:39

## 2017-09-09 RX ADMIN — Medication 81 MILLIGRAM(S): at 11:38

## 2017-09-09 RX ADMIN — SODIUM CHLORIDE 50 MILLILITER(S): 9 INJECTION, SOLUTION INTRAVENOUS at 17:41

## 2017-09-09 RX ADMIN — Medication 125 MILLIGRAM(S): at 11:32

## 2017-09-09 RX ADMIN — GABAPENTIN 300 MILLIGRAM(S): 400 CAPSULE ORAL at 11:39

## 2017-09-09 RX ADMIN — Medication 125 MILLIGRAM(S): at 23:09

## 2017-09-09 RX ADMIN — PRAMIPEXOLE DIHYDROCHLORIDE 0.12 MILLIGRAM(S): 0.12 TABLET ORAL at 11:39

## 2017-09-09 RX ADMIN — AMLODIPINE BESYLATE 10 MILLIGRAM(S): 2.5 TABLET ORAL at 11:30

## 2017-09-09 RX ADMIN — PANTOPRAZOLE SODIUM 10 MG/HR: 20 TABLET, DELAYED RELEASE ORAL at 23:08

## 2017-09-09 RX ADMIN — Medication 125 MILLIGRAM(S): at 11:31

## 2017-09-09 RX ADMIN — Medication 8 MILLIGRAM(S): at 22:45

## 2017-09-09 RX ADMIN — Medication 1 TABLET(S): at 11:30

## 2017-09-09 NOTE — PROGRESS NOTE ADULT - ASSESSMENT
Imp:  Anemia, d/o duodenal dieulafoy's  C. diff, but diarrhea resolved    Rec:  Plan for enteroscopy monday AM

## 2017-09-09 NOTE — PROGRESS NOTE ADULT - ASSESSMENT
81 y/o male with h/o CAD s/p stents (LAD, RCA bare metal around 9/16), PVD (RLE stent/ angioplasty and surgical debridement 5/20, right lower extremity amputation), A.Fib  on coumadin, Hypertension, COPD on home O2 2L, SHAYY with BIPAP, chronic diastolic CHF, Hyperlipidemia, PVD, Iron deficiency anemia, chronic back pain, Gout, BPH, CKD III . 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 Patient has not had any gross blood in stools. In ED, no melena. He is reported with persistent diarrhea.     1. Diarrheal syndrome improving. CDAD. Lower GI bleeding. Anemia, likely iron deficiency anemia.  -anemia is improved s/p PRBC  -improving  -contact isolation  -on vancomycin 125 mg PO q6h # 4  -tolerating abx well so far; no side effects noted  -continue abx coverage  -monitor temps  -GI evaluation appreciated  -f/u CBC  -supportive care  2. Other issues:   -care per medicine

## 2017-09-09 NOTE — PROGRESS NOTE ADULT - ASSESSMENT
81 y/o with hx of CKD stage 3/4 with recent new baseline scr of 1.6-1.7 presents with BRBPR with hx of GI bleed.  Now with worsening anemia and being evaluated for Acute blood loss anemia.  Pt has been receiving epogen and po Iron as outpt at [facility]    PLAN  - dc IVF, pt at his baseline renal function and is usually with mild hypernatremia  - hold lasix for now and will moniter when need to start this  - transfusion as per hospitalist/ GI  - will hold off on epogen for now, get iron studies, ferritin, retic etc.  Pt has a hx of chronically low hgb (8-9) recently and was attributed to GI loss        9/7  daughter at bedside   feels well  + C dif on po vanco   daughter questioning the Lasix use  got 1 u prbc last night for hgb 6.9  d/w Pts daughter, labs pending    9/8 MK  - CKD stage 3 stable with chronic mild hypernatremia, encourage po intake  - GIB; for transfusion today.  and will give lasix iv x1   family updated at bedside.    -    9/9 MK  - Ckd stage 3 at baseline with mild hyperntremia: po intake  - GIB bleed: h/h stable today and prn lasix

## 2017-09-09 NOTE — PROGRESS NOTE ADULT - SUBJECTIVE AND OBJECTIVE BOX
Patient is a 82y old  Male who presents with a chief complaint of bright blood at rectal area (04 Sep 2017 01:34)    HPI:  83 y/o male with h/o CAD s/p stents (LAD, RCA bare metal around 9/16), PVD (RLE stent/ angioplasty and surgical debridement 5/20, right lower extremity amputation), A.Fib  on coumadin, Hypertension, COPD on home O2 2L, SHAYY with BIPAP, chronic diastolic CHF, Hyperlipidemia, PVD, Iron deficiency anemia, chronic back pain, Gout, BPH, CKD III . 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 Patient has not had any gross blood in stools. In ED, no melena. He is reported with persistent diarrhea.     Loose stools are improved; no furter bleeding reported  Feels stronger  No fever or chills    MEDICATIONS  (STANDING):  ferrous    sulfate 325 milliGRAM(s) Oral daily  metoprolol 25 milliGRAM(s) Oral two times a day  pramipexole 0.125 milliGRAM(s) Oral daily  lactobacillus acidophilus 1 Tablet(s) Oral two times a day with meals  allopurinol 100 milliGRAM(s) Oral daily  buDESOnide   0.5 milliGRAM(s) Respule 0.5 milliGRAM(s) Inhalation every 12 hours  doxazosin 8 milliGRAM(s) Oral at bedtime  isosorbide   mononitrate ER Tablet (IMDUR) 120 milliGRAM(s) Oral daily  gabapentin 300 milliGRAM(s) Oral daily  simvastatin 10 milliGRAM(s) Oral at bedtime  amLODIPine   Tablet 10 milliGRAM(s) Oral daily  vancomycin    Solution 125 milliGRAM(s) Oral every 6 hours  pantoprazole Infusion 8 mG/Hr (10 mL/Hr) IV Continuous <Continuous>  aspirin  chewable 81 milliGRAM(s) Oral daily  dextrose 5%. 1000 milliLiter(s) (50 mL/Hr) IV Continuous <Continuous>    MEDICATIONS  (PRN):  ALBUTerol/ipratropium for Nebulization 3 milliLiter(s) Nebulizer every 4 hours PRN Shortness of Breath and/or Wheezing  HYDROmorphone   Tablet 2 milliGRAM(s) Oral three times a day PRN Moderate Pain (4 - 6)  acetaminophen   Tablet 500 milliGRAM(s) Oral every 4 hours PRN For Temp greater than 38 C (100.4 F)  zinc oxide 40%/lanolin Ointment 1 Application(s) Topical every 3 hours PRN diarrhea episodes      Vital Signs Last 24 Hrs  T(C): 36.7 (09 Sep 2017 11:44), Max: 36.7 (08 Sep 2017 15:25)  T(F): 98.1 (09 Sep 2017 11:44), Max: 98.1 (09 Sep 2017 11:44)  HR: 67 (09 Sep 2017 11:49) (32 - 79)  BP: 132/50 (09 Sep 2017 11:44) (125/48 - 142/39)  BP(mean): --  RR: 24 (09 Sep 2017 11:44) (16 - 24)  SpO2: 100% (09 Sep 2017 11:44) (100% - 100%)    Physical Exam:      Constitutional: frail looking  HEENT: NC/AT, EOMI, PERRLA  Neck: supple  Back: no tenderness  Respiratory: decreased BS at bases  Cardiovascular: S1S2 regular, no murmurs  Abdomen: soft, not tender, not distended, positive BS  Genitourinary: deferred  Rectal: deferred  Musculoskeletal: no muscle tenderness, no joint swelling or tenderness  Extremities: no pedal edema  Neurological: AxOx3, moving all extremities, no focal deficits  Skin: no rashes    Labs:                        7.3    4.6   )-----------( 208      ( 08 Sep 2017 07:48 )             22.7     09-08    148<H>  |  117<H>  |  68<H>  ----------------------------<  100<H>  3.9   |  25  |  1.42<H>    Ca    8.1<L>      08 Sep 2017 07:48             Cultures:                         8.2    5.9   )-----------( 216      ( 07 Sep 2017 09:08 )             25.2     09-07    150<H>  |  118<H>  |  74<H>  ----------------------------<  99  4.1   |  24  |  1.33<H>    Ca    8.1<L>      07 Sep 2017 09:08                            6.5    x     )-----------( x        ( 06 Sep 2017 10:04 )             20.3     09-06    148<H>  |  116<H>  |  56<H>  ----------------------------<  97  4.6   |  24  |  1.52<H>    Ca    7.6<L>      06 Sep 2017 07:47  Mg     1.9     09-05               Radiology:    < from: Xray Chest 1 View AP/PA. (09.03.17 @ 18:47) >  Stable diffuse reticulonodular opacities likely consistent with pulmonary   vascular congestion. More prominent airspace opacity with air   bronchograms seen projecting over the left ventricular apex. Consider PA   and lateral views or CT of the chest. Follow to resolution.    < end of copied text >      Advanced directives addressed: full resuscitation

## 2017-09-09 NOTE — PROGRESS NOTE ADULT - SUBJECTIVE AND OBJECTIVE BOX
NEPHROLOGY INTERVAL HPI/OVERNIGHT EVENTS:  Doing well, no new complaints.  tolerating po.  s/p transfusion yesterday      MEDICATIONS  (STANDING):  ferrous    sulfate 325 milliGRAM(s) Oral daily  metoprolol 25 milliGRAM(s) Oral two times a day  pramipexole 0.125 milliGRAM(s) Oral daily  lactobacillus acidophilus 1 Tablet(s) Oral two times a day with meals  allopurinol 100 milliGRAM(s) Oral daily  buDESOnide   0.5 milliGRAM(s) Respule 0.5 milliGRAM(s) Inhalation every 12 hours  doxazosin 8 milliGRAM(s) Oral at bedtime  isosorbide   mononitrate ER Tablet (IMDUR) 120 milliGRAM(s) Oral daily  gabapentin 300 milliGRAM(s) Oral daily  simvastatin 10 milliGRAM(s) Oral at bedtime  amLODIPine   Tablet 10 milliGRAM(s) Oral daily  vancomycin    Solution 125 milliGRAM(s) Oral every 6 hours  pantoprazole Infusion 8 mG/Hr (10 mL/Hr) IV Continuous <Continuous>  aspirin  chewable 81 milliGRAM(s) Oral daily    MEDICATIONS  (PRN):  ALBUTerol/ipratropium for Nebulization 3 milliLiter(s) Nebulizer every 4 hours PRN Shortness of Breath and/or Wheezing  HYDROmorphone   Tablet 2 milliGRAM(s) Oral three times a day PRN Moderate Pain (4 - 6)  acetaminophen   Tablet 500 milliGRAM(s) Oral every 4 hours PRN For Temp greater than 38 C (100.4 F)  zinc oxide 40%/lanolin Ointment 1 Application(s) Topical every 3 hours PRN diarrhea episodes      Allergies    Zosyn (Rash)    Intolerances        I&O's Detail    08 Sep 2017 07:01  -  09 Sep 2017 07:00  --------------------------------------------------------  IN:    Oral Fluid: 600 mL    Packed Red Blood Cells: 322 mL    pantoprazole Infusion: 277 mL  Total IN: 1199 mL    OUT:    Voided: 550 mL  Total OUT: 550 mL    Total NET: 649 mL      09 Sep 2017 07:01  -  09 Sep 2017 15:00  --------------------------------------------------------  IN:  Total IN: 0 mL    OUT:    Voided: 750 mL  Total OUT: 750 mL    Total NET: -750 mL          .    Vital Signs Last 24 Hrs  T(C): 36.7 (09 Sep 2017 11:44), Max: 36.7 (08 Sep 2017 15:25)  T(F): 98.1 (09 Sep 2017 11:44), Max: 98.1 (09 Sep 2017 11:44)  HR: 67 (09 Sep 2017 11:49) (32 - 79)  BP: 132/50 (09 Sep 2017 11:44) (125/48 - 142/39)  BP(mean): --  RR: 24 (09 Sep 2017 11:44) (16 - 24)  SpO2: 100% (09 Sep 2017 11:44) (100% - 100%)  Daily     Daily     PHYSICAL EXAM:  General: alert. awake Ox3  HEENT: MMM  CV: s1s2 rrr  LUNGS: B/L CTA  EXT: no edema, sacral edema improved    LABS:                        8.4    x     )-----------( x        ( 09 Sep 2017 12:33 )             25.6     09-09    148<H>  |  117<H>  |  60<H>  ----------------------------<  103<H>  3.8   |  26  |  1.59<H>    Ca    8.0<L>      09 Sep 2017 07:02      PT/INR - ( 08 Sep 2017 07:48 )   PT: 19.8 sec;   INR: 1.81 ratio

## 2017-09-09 NOTE — PROGRESS NOTE ADULT - SUBJECTIVE AND OBJECTIVE BOX
Patient is a 82y old  Male who presents with a chief complaint of bright blood at rectal area (04 Sep 2017 01:34)      Subective:  No bleeding. No diarrhea    PAST MEDICAL & SURGICAL HISTORY:  Diastolic CHF  Peripheral vascular disease  Afib  Anemia  CKD (chronic kidney disease)  COPD (chronic obstructive pulmonary disease)  SHAYY (obstructive sleep apnea)  Sepsis, due to unspecified organism: 2/2 poorly healing wounds b/l  Dyspepsia: On moderate exertion.  Sleep apnea, obstructive: Requires home 02 therapy, and treatment with BIPAP  Atelectasis  Pleural effusion, bilateral  Respiratory failure  Peripheral edema  CRI (chronic renal insufficiency)  Gout  Benign prostatic hypertrophy  Spinal stenosis  Hypercholesterolemia  GERD (gastroesophageal reflux disease)  CAD (coronary artery disease)  Hypertension  S/P angioplasty with stent  Cataract of left eye  Prostate: Surgery green light procedure.  S/P rotator cuff surgery: Right  S/P angioplasty      MEDICATIONS  (STANDING):  ferrous    sulfate 325 milliGRAM(s) Oral daily  metoprolol 25 milliGRAM(s) Oral two times a day  pramipexole 0.125 milliGRAM(s) Oral daily  lactobacillus acidophilus 1 Tablet(s) Oral two times a day with meals  allopurinol 100 milliGRAM(s) Oral daily  buDESOnide   0.5 milliGRAM(s) Respule 0.5 milliGRAM(s) Inhalation every 12 hours  doxazosin 8 milliGRAM(s) Oral at bedtime  isosorbide   mononitrate ER Tablet (IMDUR) 120 milliGRAM(s) Oral daily  gabapentin 300 milliGRAM(s) Oral daily  simvastatin 10 milliGRAM(s) Oral at bedtime  amLODIPine   Tablet 10 milliGRAM(s) Oral daily  vancomycin    Solution 125 milliGRAM(s) Oral every 6 hours  pantoprazole Infusion 8 mG/Hr (10 mL/Hr) IV Continuous <Continuous>  aspirin  chewable 81 milliGRAM(s) Oral daily  dextrose 5%. 1000 milliLiter(s) (50 mL/Hr) IV Continuous <Continuous>    MEDICATIONS  (PRN):  ALBUTerol/ipratropium for Nebulization 3 milliLiter(s) Nebulizer every 4 hours PRN Shortness of Breath and/or Wheezing  HYDROmorphone   Tablet 2 milliGRAM(s) Oral three times a day PRN Moderate Pain (4 - 6)  acetaminophen   Tablet 500 milliGRAM(s) Oral every 4 hours PRN For Temp greater than 38 C (100.4 F)  zinc oxide 40%/lanolin Ointment 1 Application(s) Topical every 3 hours PRN diarrhea episodes      REVIEW OF SYSTEMS:    RESPIRATORY: No shortness of breath  CARDIOVASCULAR: No chest pain  All other review of systems is negative unless indicated above.    Vital Signs Last 24 Hrs  T(C): 36.7 (09 Sep 2017 11:44), Max: 36.7 (09 Sep 2017 11:44)  T(F): 98.1 (09 Sep 2017 11:44), Max: 98.1 (09 Sep 2017 11:44)  HR: 67 (09 Sep 2017 11:49) (32 - 79)  BP: 132/50 (09 Sep 2017 11:44) (125/48 - 132/50)  BP(mean): --  RR: 24 (09 Sep 2017 11:44) (16 - 24)  SpO2: 100% (09 Sep 2017 11:44) (100% - 100%)    PHYSICAL EXAM:    Constitutional: NAD, well-developed  Respiratory: CTAB  Cardiovascular: S1 and S2, RRR  Gastrointestinal: BS+, soft, NT/ND  Extremities: No peripheral edema  Psychiatric: Normal mood, normal affect    LABS:                        8.4    x     )-----------( x        ( 09 Sep 2017 12:33 )             25.6     09-09    148<H>  |  117<H>  |  60<H>  ----------------------------<  103<H>  3.8   |  26  |  1.59<H>    Ca    8.0<L>      09 Sep 2017 07:02      PT/INR - ( 08 Sep 2017 07:48 )   PT: 19.8 sec;   INR: 1.81 ratio               RADIOLOGY & ADDITIONAL STUDIES:

## 2017-09-09 NOTE — PROGRESS NOTE ADULT - SUBJECTIVE AND OBJECTIVE BOX
Subjective:  Patient is a 82y old  Male who presents with a chief complaint of bright blood at rectal area    HPI:         82 year old male with history of CAD s/p stents (LAD, RCA bare metal around 9/16),PVD (RLE stent/ angioplasty and surgical debridement by Dr Siu 5/20,right lower extremity amputation ) A.Fib  on coumadin, Hypertension, COPD on home O2 2L, SHAYY on history of  nocturnal BIPAP, ex-smoker (smoked 1ppd X 50 years, quit 22 years ago),  Chronic diastolic CHF, Hyperlipidemia, PVD, Iron deficiency anemia, Chronic back pain, Gout, BPH, CKD III . 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 Patient has been in rehab now for 5 months   In ED - no gross blood in stools, no cookie ,  ED physician notes stool guaic positive ,on rectal exam done by ED physician there was some perianal skin tear and brown stools , INR  was 4 , reports chronic diarrhea 10 times a day for which he takes loperamide prn and lactobacillus    9/9/17: Chart reviewed, Pt was seen and examined reports feeling fine today, no diarrhea, no episodes of bleeding, tolerates diet. Pt reports no SOB or CP. States that agrees on procedure and understands risk     Review of system- Rest of the review of system are negative except mentioned in HPI    OBJECTIVE:   Vital Signs Last 24 Hrs  T(C): 36.7 (09 Sep 2017 11:44), Max: 36.7 (09 Sep 2017 11:44)  T(F): 98.1 (09 Sep 2017 11:44), Max: 98.1 (09 Sep 2017 11:44)  HR: 67 (09 Sep 2017 11:49) (32 - 79)  BP: 132/50 (09 Sep 2017 11:44) (125/48 - 132/50)  RR: 24 (09 Sep 2017 11:44) (16 - 24)  SpO2: 100% (09 Sep 2017 11:44) (100% - 100%)          PHYSICAL EXAM:  GENERAL: NAD  NERVOUS SYSTEM:  Alert & Oriented X3, non- focal exam  HEAD:  Atraumatic, Normocephalic  EYES: EOMI, PERRLA, conjunctiva and sclera clear  HEENT: Moist mucous membranes  NECK: Supple, No JVD  CHEST/LUNG: Clear to auscultation bilaterally; No rales, no rhonchi, no wheezing, or rubs  HEART: Regular rate and rhythm; No murmurs, rubs, or gallops  ABDOMEN: Soft, Nontender, mildly distended; Bowel sounds present  GENITOURINARY- Voiding, no suprapubic tenderness  EXTREMITIES:  - chronic LLE edema 1+, RLE amputation  MUSCULOSKELETAL: No muscle tenderness, Muscle tone normal, No joint tenderness, no Joint swelling, Joint range of motion-normal  SKIN-no rash, no lesion    LABS:                        8.4    x     )-----------( x        ( 09 Sep 2017 12:33 )             25.6     09-09    148<H>  |  117<H>  |  60<H>  ----------------------------<  103<H>  3.8   |  26  |  1.59<H>    Ca    8.0<L>      09 Sep 2017 07:02      PT/INR - ( 08 Sep 2017 07:48 )   PT: 19.8 sec;   INR: 1.81 ratio        RECENT CULTURES:  Clostridium difficile Toxin by PCR (09.05.17 @ 12:30)    C Diff by PCR Result: Detected      Culture - Urine (09.03.17 @ 20:28)    Specimen Source: .Urine None    Culture Results:   No growth      RADIOLOGY & ADDITIONAL TESTS:      < from: Xray Chest 1 View AP/PA. (09.03.17 @ 18:47) >  Stable diffuse reticulonodular opacities likely consistent with pulmonary   vascular congestion. More prominent airspace opacity with air   bronchograms seen projecting over the left ventricular apex. Consider PA   and lateral views or CT of the chest. Follow to resolution.        MEDICATIONS  (STANDING):  ferrous    sulfate 325 milliGRAM(s) Oral daily  metoprolol 25 milliGRAM(s) Oral two times a day  pramipexole 0.125 milliGRAM(s) Oral daily  lactobacillus acidophilus 1 Tablet(s) Oral two times a day with meals  allopurinol 100 milliGRAM(s) Oral daily  buDESOnide   0.5 milliGRAM(s) Respule 0.5 milliGRAM(s) Inhalation every 12 hours  doxazosin 8 milliGRAM(s) Oral at bedtime  isosorbide   mononitrate ER Tablet (IMDUR) 120 milliGRAM(s) Oral daily  gabapentin 300 milliGRAM(s) Oral daily  simvastatin 10 milliGRAM(s) Oral at bedtime  amLODIPine   Tablet 10 milliGRAM(s) Oral daily  vancomycin    Solution 125 milliGRAM(s) Oral every 6 hours  pantoprazole Infusion 8 mG/Hr (10 mL/Hr) IV Continuous <Continuous>  aspirin  chewable 81 milliGRAM(s) Oral daily  dextrose 5%. 1000 milliLiter(s) (50 mL/Hr) IV Continuous <Continuous>    MEDICATIONS  (PRN):  ALBUTerol/ipratropium for Nebulization 3 milliLiter(s) Nebulizer every 4 hours PRN Shortness of Breath and/or Wheezing  HYDROmorphone   Tablet 2 milliGRAM(s) Oral three times a day PRN Moderate Pain (4 - 6)  acetaminophen   Tablet 500 milliGRAM(s) Oral every 4 hours PRN For Temp greater than 38 C (100.4 F)  zinc oxide 40%/lanolin Ointment 1 Application(s) Topical every 3 hours PRN diarrhea episodes

## 2017-09-09 NOTE — PROGRESS NOTE ADULT - ASSESSMENT
82 year old male with history of CAD s/p stents (LAD, RCA bare metal around 9/16),PVD (RLE stent/ angioplasty and surgical debridement by Dr Siu 5/20,right lower extremity amputation ) A.Fib  on coumadin, Hypertension, COPD on home O2 2L, SHAYY on history of  nocturnal BIPAP, ex-smoker (smoked 1ppd X 50 years, quit 22 years ago),  Chronic diastolic CHF, Hyperlipidemia, PVD, Iron deficiency anemia, Chronic back pain, Gout, BPH, CKD III . 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 Patient has been in rehab now for 5 months           # Rectal bleeding, source unclear,   suspected upper  GI bleed , also has perirectal superficial skin tear  with supratherapiutic INR  - positive occult blood in ED, FOBT +   -had  drop in H/H  s/p 2 units PRBC 9/7/17, 1 unit of PRBC 9/8/17  - H/h improved, monitor   - continue hold coumadin and Plavix,  heparin sq prophylactic, started on ASA   - monitor for bleeding  - GI consult  appreciated, plan for push enterography    - last colonoscopy march - hemorrhoids, tics, "mass" - unclear what follow up for mass was  - cardiology clearance prior procedure appreciated  - pulmonary evaluation for pulmonary status optimization pending     # Acute on chronic diarrhea due to C diff colitis, resolved   - C/w PO vanco  - C diff +  - stool cx, o& p  - stop fenofibrate, hydralazine  - ID f/u appreciated       # Coagulapathy with INR 1.8  - hold coumadin    - PT/INR daily  - Segundo eventually need A/c     # ARYA on CKD stage 3  - c/w   IV hydration  -Cr improving   - renal f/u appreciated     # Hyperchloremic Hypernatremia likely due to dehydration   -c/w D5W  - Oral hydration     # Hypokalemia, mild, resolved  - replace monitor , check Mg    #hx of Afib/CAD /stents, Peripheral arterial disease, DVT/IVC filter    #  Chronic diastolic heart failure  stable  - euvolemic at present   - cardio consult input noted        #DVT prophylaxis - heparin sq    Dispo:  - IV PPI, h/h monitoring, GI endoscopic evaluation

## 2017-09-10 LAB
ALLERGY+IMMUNOLOGY DIAG STUDY NOTE: SIGNIFICANT CHANGE UP
ANION GAP SERPL CALC-SCNC: 4 MMOL/L — LOW (ref 5–17)
APTT BLD: 32.3 SEC — SIGNIFICANT CHANGE UP (ref 27.5–37.4)
BUN SERPL-MCNC: 53 MG/DL — HIGH (ref 7–23)
CALCIUM SERPL-MCNC: 7.8 MG/DL — LOW (ref 8.5–10.1)
CHLORIDE SERPL-SCNC: 113 MMOL/L — HIGH (ref 96–108)
CO2 SERPL-SCNC: 27 MMOL/L — SIGNIFICANT CHANGE UP (ref 22–31)
CREAT SERPL-MCNC: 1.58 MG/DL — HIGH (ref 0.5–1.3)
GLUCOSE SERPL-MCNC: 115 MG/DL — HIGH (ref 70–99)
HCT VFR BLD CALC: 24.5 % — LOW (ref 39–50)
HCT VFR BLD CALC: 26.1 % — LOW (ref 39–50)
HGB BLD-MCNC: 7.9 G/DL — LOW (ref 13–17)
HGB BLD-MCNC: 8.7 G/DL — LOW (ref 13–17)
INR BLD: 1.23 RATIO — HIGH (ref 0.88–1.16)
MCHC RBC-ENTMCNC: 30.4 PG — SIGNIFICANT CHANGE UP (ref 27–34)
MCHC RBC-ENTMCNC: 32.3 GM/DL — SIGNIFICANT CHANGE UP (ref 32–36)
MCV RBC AUTO: 94 FL — SIGNIFICANT CHANGE UP (ref 80–100)
PLATELET # BLD AUTO: 214 K/UL — SIGNIFICANT CHANGE UP (ref 150–400)
POTASSIUM SERPL-MCNC: 3.7 MMOL/L — SIGNIFICANT CHANGE UP (ref 3.5–5.3)
POTASSIUM SERPL-SCNC: 3.7 MMOL/L — SIGNIFICANT CHANGE UP (ref 3.5–5.3)
PROTHROM AB SERPL-ACNC: 13.3 SEC — HIGH (ref 9.8–12.7)
RBC # BLD: 2.6 M/UL — LOW (ref 4.2–5.8)
RBC # FLD: 15.6 % — HIGH (ref 10.3–14.5)
SODIUM SERPL-SCNC: 144 MMOL/L — SIGNIFICANT CHANGE UP (ref 135–145)
WBC # BLD: 5.6 K/UL — SIGNIFICANT CHANGE UP (ref 3.8–10.5)
WBC # FLD AUTO: 5.6 K/UL — SIGNIFICANT CHANGE UP (ref 3.8–10.5)

## 2017-09-10 RX ORDER — FUROSEMIDE 40 MG
40 TABLET ORAL ONCE
Qty: 0 | Refills: 0 | Status: COMPLETED | OUTPATIENT
Start: 2017-09-10 | End: 2017-09-10

## 2017-09-10 RX ADMIN — Medication 0.5 MILLIGRAM(S): at 08:02

## 2017-09-10 RX ADMIN — PANTOPRAZOLE SODIUM 10 MG/HR: 20 TABLET, DELAYED RELEASE ORAL at 21:03

## 2017-09-10 RX ADMIN — SODIUM CHLORIDE 50 MILLILITER(S): 9 INJECTION, SOLUTION INTRAVENOUS at 21:03

## 2017-09-10 RX ADMIN — Medication 25 MILLIGRAM(S): at 06:42

## 2017-09-10 RX ADMIN — Medication 81 MILLIGRAM(S): at 11:22

## 2017-09-10 RX ADMIN — Medication 1 TABLET(S): at 10:04

## 2017-09-10 RX ADMIN — Medication 0.5 MILLIGRAM(S): at 20:12

## 2017-09-10 RX ADMIN — ISOSORBIDE MONONITRATE 120 MILLIGRAM(S): 60 TABLET, EXTENDED RELEASE ORAL at 11:22

## 2017-09-10 RX ADMIN — PANTOPRAZOLE SODIUM 10 MG/HR: 20 TABLET, DELAYED RELEASE ORAL at 06:31

## 2017-09-10 RX ADMIN — Medication 125 MILLIGRAM(S): at 17:55

## 2017-09-10 RX ADMIN — Medication 25 MILLIGRAM(S): at 17:54

## 2017-09-10 RX ADMIN — Medication 8 MILLIGRAM(S): at 21:03

## 2017-09-10 RX ADMIN — AMLODIPINE BESYLATE 10 MILLIGRAM(S): 2.5 TABLET ORAL at 06:42

## 2017-09-10 RX ADMIN — SIMVASTATIN 10 MILLIGRAM(S): 20 TABLET, FILM COATED ORAL at 21:03

## 2017-09-10 RX ADMIN — Medication 40 MILLIGRAM(S): at 17:53

## 2017-09-10 RX ADMIN — Medication 125 MILLIGRAM(S): at 11:23

## 2017-09-10 RX ADMIN — Medication 125 MILLIGRAM(S): at 06:42

## 2017-09-10 RX ADMIN — PRAMIPEXOLE DIHYDROCHLORIDE 0.12 MILLIGRAM(S): 0.12 TABLET ORAL at 11:23

## 2017-09-10 RX ADMIN — PANTOPRAZOLE SODIUM 10 MG/HR: 20 TABLET, DELAYED RELEASE ORAL at 06:42

## 2017-09-10 RX ADMIN — GABAPENTIN 300 MILLIGRAM(S): 400 CAPSULE ORAL at 11:22

## 2017-09-10 RX ADMIN — Medication 325 MILLIGRAM(S): at 11:22

## 2017-09-10 RX ADMIN — Medication 100 MILLIGRAM(S): at 11:22

## 2017-09-10 RX ADMIN — Medication 1 TABLET(S): at 17:54

## 2017-09-10 NOTE — PROGRESS NOTE ADULT - SUBJECTIVE AND OBJECTIVE BOX
NEPHROLOGY INTERVAL HPI/OVERNIGHT EVENTS:  no c/o doing well  for push enterescopy tomorrow  on clear liquid diet  diarrhea improved and resolving  transfused today         MEDICATIONS  (STANDING):  ferrous    sulfate 325 milliGRAM(s) Oral daily  metoprolol 25 milliGRAM(s) Oral two times a day  pramipexole 0.125 milliGRAM(s) Oral daily  lactobacillus acidophilus 1 Tablet(s) Oral two times a day with meals  allopurinol 100 milliGRAM(s) Oral daily  buDESOnide   0.5 milliGRAM(s) Respule 0.5 milliGRAM(s) Inhalation every 12 hours  doxazosin 8 milliGRAM(s) Oral at bedtime  isosorbide   mononitrate ER Tablet (IMDUR) 120 milliGRAM(s) Oral daily  gabapentin 300 milliGRAM(s) Oral daily  simvastatin 10 milliGRAM(s) Oral at bedtime  amLODIPine   Tablet 10 milliGRAM(s) Oral daily  vancomycin    Solution 125 milliGRAM(s) Oral every 6 hours  pantoprazole Infusion 8 mG/Hr (10 mL/Hr) IV Continuous <Continuous>  aspirin  chewable 81 milliGRAM(s) Oral daily  dextrose 5%. 1000 milliLiter(s) (50 mL/Hr) IV Continuous <Continuous>  furosemide   Injectable 40 milliGRAM(s) IV Push once    MEDICATIONS  (PRN):  ALBUTerol/ipratropium for Nebulization 3 milliLiter(s) Nebulizer every 4 hours PRN Shortness of Breath and/or Wheezing  HYDROmorphone   Tablet 2 milliGRAM(s) Oral three times a day PRN Moderate Pain (4 - 6)  acetaminophen   Tablet 500 milliGRAM(s) Oral every 4 hours PRN For Temp greater than 38 C (100.4 F)  zinc oxide 40%/lanolin Ointment 1 Application(s) Topical every 3 hours PRN diarrhea episodes      Allergies    Zosyn (Rash)    Intolerances        I&O's Detail    09 Sep 2017 07:01  -  10 Sep 2017 07:00  --------------------------------------------------------  IN:    dextrose 5%.: 559 mL    pantoprazole Infusion: 94 mL  Total IN: 653 mL    OUT:    Voided: 1300 mL  Total OUT: 1300 mL    Total NET: -647 mL      10 Sep 2017 07:01  -  10 Sep 2017 17:16  --------------------------------------------------------  IN:    Packed Red Blood Cells: 337 mL  Total IN: 337 mL    OUT:  Total OUT: 0 mL    Total NET: 337 mL      Vital Signs Last 24 Hrs  T(C): 36.7 (10 Sep 2017 14:50), Max: 36.8 (09 Sep 2017 21:00)  T(F): 98.1 (10 Sep 2017 14:50), Max: 98.2 (09 Sep 2017 21:00)  HR: 68 (10 Sep 2017 14:50) (65 - 87)  BP: 151/36 (10 Sep 2017 14:50) (144/35 - 154/41)  BP(mean): --  RR: 18 (10 Sep 2017 14:50) (17 - 19)  SpO2: 97% (10 Sep 2017 14:50) (96% - 100%)  Daily     Daily Weight in k.5 (10 Sep 2017 13:28)    PHYSICAL EXAM:  General: alert. awake Ox3  HEENT: MMM  CV: s1s2 rrr  LUNGS: B/L CTA  EXT: no edema  sacral edema +    LABS:                        7.9    5.6   )-----------( 214      ( 10 Sep 2017 07:14 )             24.5     09-10    144  |  113<H>  |  53<H>  ----------------------------<  115<H>  3.7   |  27  |  1.58<H>    Ca    7.8<L>      10 Sep 2017 07:18      PT/INR - ( 10 Sep 2017 07:18 )   PT: 13.3 sec;   INR: 1.23 ratio         PTT - ( 10 Sep 2017 07:18 )  PTT:32.3 sec

## 2017-09-10 NOTE — DIETITIAN INITIAL EVALUATION ADULT. - SIGNS/SYMPTOMS
Pressure ulcers stage II and unstageable Pressure ulcers stage II and unstageable, Diarrhea, Intake decr., Moderate muscle wasting, WT loss Pressure ulcers stage II & unstageable, Diarrhea, Intake decr., Mod. muscle wasting, WT loss, edema

## 2017-09-10 NOTE — DIETITIAN INITIAL EVALUATION ADULT. - ORAL INTAKE PTA
Pt reports good intake over the past few months/good good/Pt reports good intake over the past few months, however reduced portion sizes due to decreased appetite. good intake over the past few months, however reduced portion sizes due to decreased appetite./good

## 2017-09-10 NOTE — PROGRESS NOTE ADULT - SUBJECTIVE AND OBJECTIVE BOX
Patient is a 82y old  Male who presents with a chief complaint of bright blood at rectal area (04 Sep 2017 01:34)      Subective:  Black stool overnight. No other complaints.    PAST MEDICAL & SURGICAL HISTORY:  Diastolic CHF  Peripheral vascular disease  Afib  Anemia  CKD (chronic kidney disease)  COPD (chronic obstructive pulmonary disease)  SHAYY (obstructive sleep apnea)  Sepsis, due to unspecified organism: 2/2 poorly healing wounds b/l  Dyspepsia: On moderate exertion.  Sleep apnea, obstructive: Requires home 02 therapy, and treatment with BIPAP  Atelectasis  Pleural effusion, bilateral  Respiratory failure  Peripheral edema  CRI (chronic renal insufficiency)  Gout  Benign prostatic hypertrophy  Spinal stenosis  Hypercholesterolemia  GERD (gastroesophageal reflux disease)  CAD (coronary artery disease)  Hypertension  S/P angioplasty with stent  Cataract of left eye  Prostate: Surgery green light procedure.  S/P rotator cuff surgery: Right  S/P angioplasty      MEDICATIONS  (STANDING):  ferrous    sulfate 325 milliGRAM(s) Oral daily  metoprolol 25 milliGRAM(s) Oral two times a day  pramipexole 0.125 milliGRAM(s) Oral daily  lactobacillus acidophilus 1 Tablet(s) Oral two times a day with meals  allopurinol 100 milliGRAM(s) Oral daily  buDESOnide   0.5 milliGRAM(s) Respule 0.5 milliGRAM(s) Inhalation every 12 hours  doxazosin 8 milliGRAM(s) Oral at bedtime  isosorbide   mononitrate ER Tablet (IMDUR) 120 milliGRAM(s) Oral daily  gabapentin 300 milliGRAM(s) Oral daily  simvastatin 10 milliGRAM(s) Oral at bedtime  amLODIPine   Tablet 10 milliGRAM(s) Oral daily  vancomycin    Solution 125 milliGRAM(s) Oral every 6 hours  pantoprazole Infusion 8 mG/Hr (10 mL/Hr) IV Continuous <Continuous>  aspirin  chewable 81 milliGRAM(s) Oral daily  dextrose 5%. 1000 milliLiter(s) (50 mL/Hr) IV Continuous <Continuous>    MEDICATIONS  (PRN):  ALBUTerol/ipratropium for Nebulization 3 milliLiter(s) Nebulizer every 4 hours PRN Shortness of Breath and/or Wheezing  HYDROmorphone   Tablet 2 milliGRAM(s) Oral three times a day PRN Moderate Pain (4 - 6)  acetaminophen   Tablet 500 milliGRAM(s) Oral every 4 hours PRN For Temp greater than 38 C (100.4 F)  zinc oxide 40%/lanolin Ointment 1 Application(s) Topical every 3 hours PRN diarrhea episodes      REVIEW OF SYSTEMS:    RESPIRATORY: No shortness of breath  CARDIOVASCULAR: No chest pain  All other review of systems is negative unless indicated above.    Vital Signs Last 24 Hrs  T(C): 36.7 (10 Sep 2017 05:41), Max: 36.8 (09 Sep 2017 21:00)  T(F): 98 (10 Sep 2017 05:41), Max: 98.2 (09 Sep 2017 21:00)  HR: 70 (10 Sep 2017 07:50) (32 - 87)  BP: 144/35 (10 Sep 2017 05:41) (132/50 - 153/48)  BP(mean): --  RR: 17 (10 Sep 2017 05:41) (17 - 24)  SpO2: 97% (10 Sep 2017 05:41) (97% - 100%)    PHYSICAL EXAM:    Constitutional: NAD, well-developed  Respiratory: CTAB  Cardiovascular: S1 and S2, RRR  Gastrointestinal: BS+, soft, NT/ND  Extremities: No peripheral edema  Psychiatric: Normal mood, normal affect    LABS:                        7.9    5.6   )-----------( 214      ( 10 Sep 2017 07:14 )             24.5     09-10    144  |  113<H>  |  53<H>  ----------------------------<  115<H>  3.7   |  27  |  1.58<H>    Ca    7.8<L>      10 Sep 2017 07:18

## 2017-09-10 NOTE — DIETITIAN INITIAL EVALUATION ADULT. - FACTORS AFF FOOD INTAKE
none other (specify)/reduced appetite and portion sizes in the past few months (~75% intake consumed normal intake ~100%)

## 2017-09-10 NOTE — PROGRESS NOTE ADULT - ASSESSMENT
83 y/o with hx of CKD stage 3/4 with recent new baseline scr of 1.6-1.7 presents with BRBPR with hx of GI bleed.  Now with worsening anemia and being evaluated for Acute blood loss anemia.  Pt has been receiving epogen and po Iron as outpt at [facility]    PLAN  - dc IVF, pt at his baseline renal function and is usually with mild hypernatremia  - hold lasix for now and will moniter when need to start this  - transfusion as per hospitalist/ GI  - will hold off on epogen for now, get iron studies, ferritin, retic etc.  Pt has a hx of chronically low hgb (8-9) recently and was attributed to GI loss        9/7  daughter at bedside   feels well  + C dif on po vanco   daughter questioning the Lasix use  got 1 u prbc last night for hgb 6.9  d/w Pts daughter, labs pending    9/8 MK  - CKD stage 3 stable with chronic mild hypernatremia, encourage po intake  - GIB; for transfusion today.  and will give lasix iv x1   family updated at bedside.    -    9/9 MK  - Ckd stage 3 at baseline with mild hyperntremia: po intake  - GIB bleed: h/h stable today and prn lasix    9/10 MK  - Stable CKD stage 3 with baseline hypernatremia: stable   - GIB bleed s/p transfusion today, for push enterscopy tomorrow  lasix iv today  - cdad: improving on po vanc

## 2017-09-10 NOTE — DIETITIAN INITIAL EVALUATION ADULT. - PHYSICAL APPEARANCE
overweight well nourished/Appears well nourished except for visual temporal wasting, moderate clavicle muscle wasting

## 2017-09-10 NOTE — CHART NOTE - NSCHARTNOTEFT_GEN_A_CORE
Upon Nutritional Assessment by the Registered Dietitian your patient was determined to meet criteria has evidence of the following diagnosis/diagnoses:        [ ]  Mild Protein Calorie Malnutrition        [x ]  Moderate Protein Calorie Malnutrition        [ ] Severe Protein Calorie Malnutrition        [ ] Unspecified Protein Calorie Malnutrition    Findings as based on:  •  Comprehensive nutrition assessment and Nutrition focused physical examination  •  Insufficient food/energy intake  •  Weight loss over time(Please specify time period)  •  Loss of muscle mass  •  Loss of fat mass  •  Fluid accumulation    Findings relevant to patient:  1. moderate muscle wasting  2. decreased po intake (smaller quantity and 75% po intake)  3 not meeting increased protein needs  4. clear liquid diet  5. significant weight loss x 3 months 11.8%  6. diarrhea  7. unhealed pressure ulcers    Nutrition Interventions:  1. Advance diet when medically feasible to low residue  2. Prosource 1oz. po TID mixed with juice for additional protein  3. MVI, Zinc, and Vitamin C supplement for wound healing    PROVIDER Section:     By signing this assessment you are acknowledging and agree with the diagnosis/diagnoses assigned by the Registered Dietitian    Comments: Upon Nutritional Assessment by the Registered Dietitian your patient was determined to meet criteria has evidence of the following diagnosis/diagnoses:        [ ]  Mild Protein Calorie Malnutrition        [x ]  Moderate Protein Calorie Malnutrition        [ ] Severe Protein Calorie Malnutrition        [ ] Unspecified Protein Calorie Malnutrition    Findings as based on:  •  Comprehensive nutrition assessment and Nutrition focused physical examination  •  Insufficient food/energy intake  •  Weight loss over time(Please specify time period)  •  Loss of muscle mass  •  Loss of fat mass  •  Fluid accumulation    Findings relevant to patient:  1. moderate muscle wasting  2. decreased po intake (smaller quantity and 75% po intake)  3 not meeting increased protein needs  4. clear liquid diet  5. significant weight loss x 3 months 11.8%  6. diarrhea  7. unhealed pressure ulcers  8. Edema +2, +3  Nutrition Interventions:  1. Advance diet when medically feasible to low residue  2. Prosource 1oz. po TID mixed with juice for additional protein  3. MVI, Zinc, and Vitamin C supplement for wound healing    PROVIDER Section:     By signing this assessment you are acknowledging and agree with the diagnosis/diagnoses assigned by the Registered Dietitian    Comments:

## 2017-09-10 NOTE — PROGRESS NOTE ADULT - SUBJECTIVE AND OBJECTIVE BOX
Patient is a 82y old  Male who presents with a chief complaint of bright blood at rectal area (04 Sep 2017 01:34)    HPI:  81 y/o male with h/o CAD s/p stents (LAD, RCA bare metal around 9/16), PVD (RLE stent/ angioplasty and surgical debridement 5/20, right lower extremity amputation), A.Fib  on coumadin, Hypertension, COPD on home O2 2L, SHAYY with BIPAP, chronic diastolic CHF, Hyperlipidemia, PVD, Iron deficiency anemia, chronic back pain, Gout, BPH, CKD III . 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 Patient has not had any gross blood in stools. In ED, no melena. He is reported with persistent diarrhea.     Loose stools improving  No pain  No fever or chills    MEDICATIONS  (STANDING):  ferrous    sulfate 325 milliGRAM(s) Oral daily  metoprolol 25 milliGRAM(s) Oral two times a day  pramipexole 0.125 milliGRAM(s) Oral daily  lactobacillus acidophilus 1 Tablet(s) Oral two times a day with meals  allopurinol 100 milliGRAM(s) Oral daily  buDESOnide   0.5 milliGRAM(s) Respule 0.5 milliGRAM(s) Inhalation every 12 hours  doxazosin 8 milliGRAM(s) Oral at bedtime  isosorbide   mononitrate ER Tablet (IMDUR) 120 milliGRAM(s) Oral daily  gabapentin 300 milliGRAM(s) Oral daily  simvastatin 10 milliGRAM(s) Oral at bedtime  amLODIPine   Tablet 10 milliGRAM(s) Oral daily  vancomycin    Solution 125 milliGRAM(s) Oral every 6 hours  pantoprazole Infusion 8 mG/Hr (10 mL/Hr) IV Continuous <Continuous>  aspirin  chewable 81 milliGRAM(s) Oral daily  dextrose 5%. 1000 milliLiter(s) (50 mL/Hr) IV Continuous <Continuous>    MEDICATIONS  (PRN):  ALBUTerol/ipratropium for Nebulization 3 milliLiter(s) Nebulizer every 4 hours PRN Shortness of Breath and/or Wheezing  HYDROmorphone   Tablet 2 milliGRAM(s) Oral three times a day PRN Moderate Pain (4 - 6)  acetaminophen   Tablet 500 milliGRAM(s) Oral every 4 hours PRN For Temp greater than 38 C (100.4 F)  zinc oxide 40%/lanolin Ointment 1 Application(s) Topical every 3 hours PRN diarrhea episodes      Vital Signs Last 24 Hrs  T(C): 36.6 (10 Sep 2017 11:51), Max: 36.8 (09 Sep 2017 21:00)  T(F): 97.9 (10 Sep 2017 11:51), Max: 98.2 (09 Sep 2017 21:00)  HR: 65 (10 Sep 2017 11:51) (65 - 87)  BP: 154/41 (10 Sep 2017 11:51) (144/35 - 154/41)  BP(mean): --  RR: 18 (10 Sep 2017 11:51) (17 - 19)  SpO2: 96% (10 Sep 2017 11:51) (96% - 100%)    Physical Exam:        Constitutional: frail looking  HEENT: NC/AT, EOMI, PERRLA  Neck: supple  Back: no tenderness  Respiratory: decreased BS at bases  Cardiovascular: S1S2 regular, no murmurs  Abdomen: soft, not tender, not distended, positive BS  Genitourinary: deferred  Rectal: deferred  Musculoskeletal: no muscle tenderness, no joint swelling or tenderness  Extremities: no pedal edema  Neurological: AxOx3, moving all extremities, no focal deficits  Skin: no rashes    Labs:                        7.9    5.6   )-----------( 214      ( 10 Sep 2017 07:14 )             24.5     09-10    144  |  113<H>  |  53<H>  ----------------------------<  115<H>  3.7   |  27  |  1.58<H>    Ca    7.8<L>      10 Sep 2017 07:18             Cultures:                         7.3    4.6   )-----------( 208      ( 08 Sep 2017 07:48 )             22.7     09-08    148<H>  |  117<H>  |  68<H>  ----------------------------<  100<H>  3.9   |  25  |  1.42<H>    Ca    8.1<L>      08 Sep 2017 07:48             Cultures:                         8.2    5.9   )-----------( 216      ( 07 Sep 2017 09:08 )             25.2     09-07    150<H>  |  118<H>  |  74<H>  ----------------------------<  99  4.1   |  24  |  1.33<H>    Ca    8.1<L>      07 Sep 2017 09:08                            6.5    x     )-----------( x        ( 06 Sep 2017 10:04 )             20.3     09-06    148<H>  |  116<H>  |  56<H>  ----------------------------<  97  4.6   |  24  |  1.52<H>    Ca    7.6<L>      06 Sep 2017 07:47  Mg     1.9     09-05               Radiology:    < from: Xray Chest 1 View AP/PA. (09.03.17 @ 18:47) >  Stable diffuse reticulonodular opacities likely consistent with pulmonary   vascular congestion. More prominent airspace opacity with air   bronchograms seen projecting over the left ventricular apex. Consider PA   and lateral views or CT of the chest. Follow to resolution.    < end of copied text >      Advanced directives addressed: full resuscitation

## 2017-09-10 NOTE — DIETITIAN INITIAL EVALUATION ADULT. - NS AS NUTRI DX NUTRIENT
Protein/Increased nutrient needs (specify) Protein/Pt meets criteria for moderate protein/calorie malnutrition in context of chronic illness secondary to significant weight loss x 3 months), and moderate muscle wasting./Increased nutrient needs (specify)/Malnutrition

## 2017-09-10 NOTE — PROGRESS NOTE ADULT - ASSESSMENT
Imp:  Recurrent GI bleed with H/H drifting down    Rec:  Tranfsue for hgb 7.7  Clears today for push enteroscopy in AM

## 2017-09-10 NOTE — DIETITIAN INITIAL EVALUATION ADULT. - NUTRITIONGOAL OUTCOME1
Pt will complete >75% at each meal and drink supplement Pt will complete >75% at each meal and drink supp, healed ulcers, No further diarrhea, No s/s Malnut

## 2017-09-10 NOTE — PROGRESS NOTE ADULT - ASSESSMENT
81 y/o male with h/o CAD s/p stents (LAD, RCA bare metal around 9/16), PVD (RLE stent/ angioplasty and surgical debridement 5/20, right lower extremity amputation), A.Fib  on coumadin, Hypertension, COPD on home O2 2L, SHAYY with BIPAP, chronic diastolic CHF, Hyperlipidemia, PVD, Iron deficiency anemia, chronic back pain, Gout, BPH, CKD III . 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 Patient has not had any gross blood in stools. In ED, no melena. He is reported with persistent diarrhea.     1. Diarrheal syndrome improving. CDAD. Lower GI bleeding resolving. Anemia, likely iron deficiency anemia.  -anemia is improved s/p PRBC  -improving  -contact isolation  -on vancomycin 125 mg PO q6h # 5  -tolerating abx well so far; no side effects noted  -continue abx coverage  -monitor temps  -GI evaluation appreciated  -f/u CBC  -supportive care  2. Other issues:   -care per medicine

## 2017-09-10 NOTE — DIETITIAN INITIAL EVALUATION ADULT. - ETIOLOGY
Increased protein due to wound healing Clear liquid diet in place, Reports po intake decreased PTA, GIB, CDIFF

## 2017-09-10 NOTE — DIETITIAN INITIAL EVALUATION ADULT. - OTHER INFO
Pt seen for length of stay. Pt reports good intake. Pt reports nausea while in hospital, reports eating bad seafood. Pt without constipation or diarrhea. Pt seen for LOS. Current diet rx clear liquid for enteroscopy on Monday AM). S/P AKA, +Cdiff w/ multiple episodes of diarrhea. Diarrhea episodes improving at this time. Pt with good appetite however intake remains at 75% of meals. Visual muscle wasting noted (moderate temporal/clavicle). Unable to do NFPE due to patient sleeping/lethargic. Denies any difficulty chewing or swallowing. No n/v/c. Skin: Right buttock stage 2, left heel unstageable. No edema noted. +Significant non intentional weight loss x 3 months (11.8%). Pt meets criteria for moderate protein/calorie malnutrition in context of chronic illness secondary to significant weight loss x 3 months), and moderate muscle wasting. Suggest advance diet when medically feasible to low residue. Additional protein/calorie supplement prosource 1oz. po TID mixed with juice for added 45gm protein. Pt seen for LOS. Current diet rx clear liquid for enteroscopy on Monday AM). S/P AKA, +Cdiff w/ multiple episodes of diarrhea. Diarrhea episodes improving at this time. Pt with good appetite however intake remains at 75% of meals. Visual muscle wasting noted (moderate temporal/clavicle). Unable to do NFPE due to patient sleeping/lethargic. Denies any difficulty chewing or swallowing. No n/v/c. Skin: Right buttock stage 2, left heel unstageable. No edema noted. +Significant non intentional weight loss x 3 months (11.8%). Pt meets criteria for moderate protein/calorie malnutrition in context of chronic illness secondary to significant weight loss x 3 months), and moderate muscle wasting. Suggest advance diet when medically feasible to low residue. Additional protein/calorie supplement prosource 1oz. po TID mixed with juice for added 45gm protein. Add MVI, zinc, and vitamin C for wound healing. Pt seen for LOS. Current diet rx clear liquid for enteroscopy on Monday AM). S/P AKA, +Cdiff w/ multiple episodes of diarrhea. Diarrhea episodes improving at this time. Pt with good appetite however intake remains at 75% of meals. Visual muscle wasting noted (moderate temporal/clavicle). Unable to do NFPE due to patient sleeping/lethargic. Denies any difficulty chewing or swallowing. No n/v/c. Skin: Right buttock stage 2, left heel unstageable. Edema left leg +2, and Right thigh +3- noted. +Significant non intentional weight loss x 3 months (11.8%). Pt meets criteria for moderate protein/calorie malnutrition in context of chronic illness secondary to significant weight loss x 3 months), and moderate muscle wasting. Suggest advance diet when medically feasible to low residue. Additional protein/calorie supplement prosource 1oz. po TID mixed with juice for added 45gm protein. Add MVI, zinc, and vitamin C for wound healing.

## 2017-09-10 NOTE — PROGRESS NOTE ADULT - ASSESSMENT
82 year old male with history of CAD s/p stents (LAD, RCA bare metal around 9/16),PVD (RLE stent/ angioplasty and surgical debridement by Dr Siu 5/20,right lower extremity amputation ) A.Fib  on coumadin, Hypertension, COPD on home O2 2L, SHAYY on history of  nocturnal BIPAP, ex-smoker (smoked 1ppd X 50 years, quit 22 years ago),  Chronic diastolic CHF, Hyperlipidemia, PVD, Iron deficiency anemia, Chronic back pain, Gout, BPH, CKD III . 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 Patient has been in rehab now for 5 months           # Rectal bleeding, source unclear,   suspected upper  GI bleed , also has perirectal superficial skin tear  with supratherapiutic INR  - positive occult blood in ED, FOBT +   -had  drop in H/H  s/p 2 units PRBC 9/7/17, 1 unit of PRBC 9/8/17  - H/h improved, monitor   - continue hold coumadin and Plavix,  heparin sq prophylactic, started on ASA   - monitor for bleeding  - GI consult  appreciated, plan for push enterography    - last colonoscopy march - hemorrhoids, tics, "mass" - unclear what follow up for mass was  - cardiology clearance prior procedure appreciated  - pulmonary evaluation for pulmonary status optimization pending     # Acute on chronic diarrhea due to C diff colitis, resolved   - C/w PO vanco  - C diff +  - stool cx, o& p  - stop fenofibrate, hydralazine  - ID f/u appreciated       # Coagulapathy with INR 1.8  - hold coumadin    - PT/INR daily  - Segundo eventually need A/c     # ARYA on CKD stage 3  - c/w   IV hydration  -Cr improving   - renal f/u appreciated     # Hyperchloremic Hypernatremia likely due to dehydration   -c/w D5W  - Oral hydration     # Hypokalemia, mild, resolved  - replace monitor , check Mg    #hx of Afib/CAD /stents, Peripheral arterial disease, DVT/IVC filter    #  Chronic diastolic heart failure  stable  - euvolemic at present   - cardio consult input noted        #DVT prophylaxis - heparin sq    Dispo:  - IV PPI, h/h monitoring, GI endoscopic evaluation 82 year old male with history of CAD s/p stents (LAD, RCA bare metal around 9/16),PVD (RLE stent/ angioplasty and surgical debridement by Dr Siu 5/20,right lower extremity amputation ) A.Fib  on coumadin, Hypertension, COPD on home O2 2L, SHAYY on history of  nocturnal BIPAP, ex-smoker (smoked 1ppd X 50 years, quit 22 years ago),  Chronic diastolic CHF, Hyperlipidemia, PVD, Iron deficiency anemia, Chronic back pain, Gout, BPH, CKD III . 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 Patient has been in rehab now for 5 months           # Rectal bleeding, source unclear,   suspected upper  GI bleed , also has perirectal superficial skin tear  with supratherapiutic INR  - positive occult blood in ED, FOBT +    -s/p 2 units PRBC 9/7/17, 1 unit of PRBC 9/8/17  - HAd melanotic stool this am with drop in H/H  - 1uPRBCs ongoing  - H/H posttransfusion ordered   - continue hold coumadin and Plavix,  - on   heparin sq prophylactic, d/c ASA   - GI consult  appreciated, plan for push enterography  in am   - last colonoscopy march - hemorrhoids, tics, "mass" - unclear what follow up for mass was  - cardiology clearance prior procedure appreciated  - pulmonary evaluation for pulmonary status optimization pending     # Acute on chronic diarrhea due to C diff colitis, resolved   - C/w PO vanco  - C diff +  - stool cx, o& p  - stop fenofibrate, hydralazine  - ID f/u appreciated       # Coagulapathy with INR 1.8  - hold coumadin    - PT/INR daily  - Segundo eventually need A/c     # ARYA on CKD stage 3  - c/w   IV hydration  -Cr improving   - renal f/u appreciated     # Hyperchloremic Hypernatremia likely due to dehydration   -c/w D5W  - Oral hydration     # Hypokalemia, mild, resolved  - replace monitor , check Mg    #hx of Afib/CAD /stents,   Peripheral arterial disease, DVT/IVC filter  - Hold a/c and antiPlts for now     #  Chronic diastolic heart failure  stable  - euvolemic at present   - cardio consult input noted        #DVT prophylaxis - heparin sq    Dispo:  - IV PPI, h/h monitoring, GI endoscopic evaluation in am

## 2017-09-10 NOTE — PROGRESS NOTE ADULT - SUBJECTIVE AND OBJECTIVE BOX
Subjective:  Patient is a 82y old  Male who presents with a chief complaint of bright blood at rectal area    HPI:         82 year old male with history of CAD s/p stents (LAD, RCA bare metal around 9/16),PVD (RLE stent/ angioplasty and surgical debridement by Dr Siu 5/20,right lower extremity amputation ) A.Fib  on coumadin, Hypertension, COPD on home O2 2L, SHAYY on history of  nocturnal BIPAP, ex-smoker (smoked 1ppd X 50 years, quit 22 years ago),  Chronic diastolic CHF, Hyperlipidemia, PVD, Iron deficiency anemia, Chronic back pain, Gout, BPH, CKD III . 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 Patient has been in rehab now for 5 months   In ED - no gross blood in stools, no cookie ,  ED physician notes stool guaic positive ,on rectal exam done by ED physician there was some perianal skin tear and brown stools , INR  was 4 , reports chronic diarrhea 10 times a day for which he takes loperamide prn and lactobacillus    9/9/17: Chart reviewed, Pt was seen and examined reports feeling fine today, no diarrhea, no episodes of bleeding, tolerates diet. Pt reports no SOB or CP. States that agrees on procedure and understands risk     Review of system- Rest of the review of system are negative except mentioned in HPI    OBJECTIVE:   Vital Signs Last 24 Hrs  T(C): 36.7 (09 Sep 2017 11:44), Max: 36.7 (09 Sep 2017 11:44)  T(F): 98.1 (09 Sep 2017 11:44), Max: 98.1 (09 Sep 2017 11:44)  HR: 67 (09 Sep 2017 11:49) (32 - 79)  BP: 132/50 (09 Sep 2017 11:44) (125/48 - 132/50)  RR: 24 (09 Sep 2017 11:44) (16 - 24)  SpO2: 100% (09 Sep 2017 11:44) (100% - 100%)          PHYSICAL EXAM:  GENERAL: NAD  NERVOUS SYSTEM:  Alert & Oriented X3, non- focal exam  HEAD:  Atraumatic, Normocephalic  EYES: EOMI, PERRLA, conjunctiva and sclera clear  HEENT: Moist mucous membranes  NECK: Supple, No JVD  CHEST/LUNG: Clear to auscultation bilaterally; No rales, no rhonchi, no wheezing, or rubs  HEART: Regular rate and rhythm; No murmurs, rubs, or gallops  ABDOMEN: Soft, Nontender, mildly distended; Bowel sounds present  GENITOURINARY- Voiding, no suprapubic tenderness  EXTREMITIES:  - chronic LLE edema 1+, RLE amputation  MUSCULOSKELETAL: No muscle tenderness, Muscle tone normal, No joint tenderness, no Joint swelling, Joint range of motion-normal  SKIN-no rash, no lesion    LABS:                        8.4    x     )-----------( x        ( 09 Sep 2017 12:33 )             25.6     09-09    148<H>  |  117<H>  |  60<H>  ----------------------------<  103<H>  3.8   |  26  |  1.59<H>    Ca    8.0<L>      09 Sep 2017 07:02      PT/INR - ( 08 Sep 2017 07:48 )   PT: 19.8 sec;   INR: 1.81 ratio        RECENT CULTURES:  Clostridium difficile Toxin by PCR (09.05.17 @ 12:30)    C Diff by PCR Result: Detected      Culture - Urine (09.03.17 @ 20:28)    Specimen Source: .Urine None    Culture Results:   No growth      RADIOLOGY & ADDITIONAL TESTS:      < from: Xray Chest 1 View AP/PA. (09.03.17 @ 18:47) >  Stable diffuse reticulonodular opacities likely consistent with pulmonary   vascular congestion. More prominent airspace opacity with air   bronchograms seen projecting over the left ventricular apex. Consider PA   and lateral views or CT of the chest. Follow to resolution.        MEDICATIONS  (STANDING):  ferrous    sulfate 325 milliGRAM(s) Oral daily  metoprolol 25 milliGRAM(s) Oral two times a day  pramipexole 0.125 milliGRAM(s) Oral daily  lactobacillus acidophilus 1 Tablet(s) Oral two times a day with meals  allopurinol 100 milliGRAM(s) Oral daily  buDESOnide   0.5 milliGRAM(s) Respule 0.5 milliGRAM(s) Inhalation every 12 hours  doxazosin 8 milliGRAM(s) Oral at bedtime  isosorbide   mononitrate ER Tablet (IMDUR) 120 milliGRAM(s) Oral daily  gabapentin 300 milliGRAM(s) Oral daily  simvastatin 10 milliGRAM(s) Oral at bedtime  amLODIPine   Tablet 10 milliGRAM(s) Oral daily  vancomycin    Solution 125 milliGRAM(s) Oral every 6 hours  pantoprazole Infusion 8 mG/Hr (10 mL/Hr) IV Continuous <Continuous>  aspirin  chewable 81 milliGRAM(s) Oral daily  dextrose 5%. 1000 milliLiter(s) (50 mL/Hr) IV Continuous <Continuous>    MEDICATIONS  (PRN):  ALBUTerol/ipratropium for Nebulization 3 milliLiter(s) Nebulizer every 4 hours PRN Shortness of Breath and/or Wheezing  HYDROmorphone   Tablet 2 milliGRAM(s) Oral three times a day PRN Moderate Pain (4 - 6)  acetaminophen   Tablet 500 milliGRAM(s) Oral every 4 hours PRN For Temp greater than 38 C (100.4 F)  zinc oxide 40%/lanolin Ointment 1 Application(s) Topical every 3 hours PRN diarrhea episodes Subjective:  Patient is a 82y old  Male who presents with a chief complaint of bright blood at rectal area    HPI:         82 year old male with history of CAD s/p stents (LAD, RCA bare metal around 9/16),PVD (RLE stent/ angioplasty and surgical debridement by Dr Siu 5/20,right lower extremity amputation ) A.Fib  on coumadin, Hypertension, COPD on home O2 2L, SHAYY on history of  nocturnal BIPAP, ex-smoker (smoked 1ppd X 50 years, quit 22 years ago),  Chronic diastolic CHF, Hyperlipidemia, PVD, Iron deficiency anemia, Chronic back pain, Gout, BPH, CKD III . 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 Patient has been in rehab now for 5 months   In ED - no gross blood in stools, no cookie ,  ED physician notes stool guaic positive ,on rectal exam done by ED physician there was some perianal skin tear and brown stools , INR  was 4 , reports chronic diarrhea 10 times a day for which he takes loperamide prn and lactobacillus    9/9/17: Chart reviewed, Pt was seen and examined reports feeling fine today, no diarrhea, no episodes of bleeding, tolerates diet. Pt reports no SOB or CP. States that agrees on procedure and understands risk   9/10/17: Pt was seen and examined, reports episode of dark stool overnight, H/H lower today, started on PRBCs. Has no other complains. POC discussed    Review of system- Rest of the review of system are negative except mentioned in HPI    OBJECTIVE:   Vital Signs Last 24 Hrs  T(C): 36.7 (10 Sep 2017 14:50), Max: 36.8 (09 Sep 2017 21:00)  T(F): 98.1 (10 Sep 2017 14:50), Max: 98.2 (09 Sep 2017 21:00)  HR: 73 (10 Sep 2017 17:50) (65 - 87)  BP: 154/43 (10 Sep 2017 17:50) (144/35 - 154/43)  RR: 18 (10 Sep 2017 14:50) (17 - 18)  SpO2: 97% (10 Sep 2017 14:50) (96% - 100%)          PHYSICAL EXAM:  GENERAL: NAD  NERVOUS SYSTEM:  Alert & Oriented X3, non- focal exam  HEAD:  Atraumatic, Normocephalic  EYES: EOMI, PERRLA, conjunctiva and sclera clear  HEENT: Moist mucous membranes  NECK: Supple, No JVD  CHEST/LUNG: Clear to auscultation bilaterally; No rales, no rhonchi, no wheezing, or rubs  HEART: Regular rate and rhythm; No murmurs, rubs, or gallops  ABDOMEN: Soft, Nontender, mildly distended; Bowel sounds present  GENITOURINARY- Voiding, no suprapubic tenderness  EXTREMITIES:  - chronic LLE edema 1+, RLE amputation  MUSCULOSKELETAL: No muscle tenderness, Muscle tone normal, No joint tenderness, no Joint swelling, Joint range of motion-normal  SKIN-no rash, no lesion    LABS:             Vital Signs Last 24 Hrs  T(C): 36.7 (10 Sep 2017 14:50), Max: 36.8 (09 Sep 2017 21:00)  T(F): 98.1 (10 Sep 2017 14:50), Max: 98.2 (09 Sep 2017 21:00)  HR: 73 (10 Sep 2017 17:50) (65 - 87)  BP: 154/43 (10 Sep 2017 17:50) (144/35 - 154/43)  BP(mean): --  RR: 18 (10 Sep 2017 14:50) (17 - 18)  SpO2: 97% (10 Sep 2017 14:50) (96% - 100%)    RECENT CULTURES:  Clostridium difficile Toxin by PCR (09.05.17 @ 12:30)    C Diff by PCR Result: Detected      Culture - Urine (09.03.17 @ 20:28)    Specimen Source: .Urine None    Culture Results:   No growth      RADIOLOGY & ADDITIONAL TESTS:      < from: Xray Chest 1 View AP/PA. (09.03.17 @ 18:47) >  Stable diffuse reticulonodular opacities likely consistent with pulmonary   vascular congestion. More prominent airspace opacity with air   bronchograms seen projecting over the left ventricular apex. Consider PA   and lateral views or CT of the chest. Follow to resolution.        MEDICATIONS  (STANDING):  ferrous    sulfate 325 milliGRAM(s) Oral daily  metoprolol 25 milliGRAM(s) Oral two times a day  pramipexole 0.125 milliGRAM(s) Oral daily  lactobacillus acidophilus 1 Tablet(s) Oral two times a day with meals  allopurinol 100 milliGRAM(s) Oral daily  buDESOnide   0.5 milliGRAM(s) Respule 0.5 milliGRAM(s) Inhalation every 12 hours  doxazosin 8 milliGRAM(s) Oral at bedtime  isosorbide   mononitrate ER Tablet (IMDUR) 120 milliGRAM(s) Oral daily  gabapentin 300 milliGRAM(s) Oral daily  simvastatin 10 milliGRAM(s) Oral at bedtime  amLODIPine   Tablet 10 milliGRAM(s) Oral daily  vancomycin    Solution 125 milliGRAM(s) Oral every 6 hours  pantoprazole Infusion 8 mG/Hr (10 mL/Hr) IV Continuous <Continuous>  aspirin  chewable 81 milliGRAM(s) Oral daily  dextrose 5%. 1000 milliLiter(s) (50 mL/Hr) IV Continuous <Continuous>    MEDICATIONS  (PRN):  ALBUTerol/ipratropium for Nebulization 3 milliLiter(s) Nebulizer every 4 hours PRN Shortness of Breath and/or Wheezing  HYDROmorphone   Tablet 2 milliGRAM(s) Oral three times a day PRN Moderate Pain (4 - 6)  acetaminophen   Tablet 500 milliGRAM(s) Oral every 4 hours PRN For Temp greater than 38 C (100.4 F)  zinc oxide 40%/lanolin Ointment 1 Application(s) Topical every 3 hours PRN diarrhea episodes

## 2017-09-11 LAB
ANION GAP SERPL CALC-SCNC: 4 MMOL/L — LOW (ref 5–17)
BUN SERPL-MCNC: 45 MG/DL — HIGH (ref 7–23)
CALCIUM SERPL-MCNC: 8 MG/DL — LOW (ref 8.5–10.1)
CHLORIDE SERPL-SCNC: 111 MMOL/L — HIGH (ref 96–108)
CO2 SERPL-SCNC: 29 MMOL/L — SIGNIFICANT CHANGE UP (ref 22–31)
CREAT SERPL-MCNC: 1.34 MG/DL — HIGH (ref 0.5–1.3)
GLUCOSE SERPL-MCNC: 92 MG/DL — SIGNIFICANT CHANGE UP (ref 70–99)
HCT VFR BLD CALC: 26.3 % — LOW (ref 39–50)
HGB BLD-MCNC: 8.5 G/DL — LOW (ref 13–17)
MCHC RBC-ENTMCNC: 30.1 PG — SIGNIFICANT CHANGE UP (ref 27–34)
MCHC RBC-ENTMCNC: 32.2 GM/DL — SIGNIFICANT CHANGE UP (ref 32–36)
MCV RBC AUTO: 93.4 FL — SIGNIFICANT CHANGE UP (ref 80–100)
PLATELET # BLD AUTO: 206 K/UL — SIGNIFICANT CHANGE UP (ref 150–400)
POTASSIUM SERPL-MCNC: 3.3 MMOL/L — LOW (ref 3.5–5.3)
POTASSIUM SERPL-SCNC: 3.3 MMOL/L — LOW (ref 3.5–5.3)
RBC # BLD: 2.81 M/UL — LOW (ref 4.2–5.8)
RBC # FLD: 15.2 % — HIGH (ref 10.3–14.5)
SODIUM SERPL-SCNC: 144 MMOL/L — SIGNIFICANT CHANGE UP (ref 135–145)
WBC # BLD: 5.4 K/UL — SIGNIFICANT CHANGE UP (ref 3.8–10.5)
WBC # FLD AUTO: 5.4 K/UL — SIGNIFICANT CHANGE UP (ref 3.8–10.5)

## 2017-09-11 PROCEDURE — 93971 EXTREMITY STUDY: CPT | Mod: 26,RT

## 2017-09-11 RX ORDER — POTASSIUM CHLORIDE 20 MEQ
40 PACKET (EA) ORAL ONCE
Qty: 0 | Refills: 0 | Status: COMPLETED | OUTPATIENT
Start: 2017-09-11 | End: 2017-09-11

## 2017-09-11 RX ADMIN — Medication 25 MILLIGRAM(S): at 05:34

## 2017-09-11 RX ADMIN — PANTOPRAZOLE SODIUM 10 MG/HR: 20 TABLET, DELAYED RELEASE ORAL at 13:06

## 2017-09-11 RX ADMIN — Medication 8 MILLIGRAM(S): at 22:34

## 2017-09-11 RX ADMIN — Medication 125 MILLIGRAM(S): at 00:20

## 2017-09-11 RX ADMIN — Medication 125 MILLIGRAM(S): at 16:01

## 2017-09-11 RX ADMIN — Medication 1 TABLET(S): at 13:02

## 2017-09-11 RX ADMIN — Medication 125 MILLIGRAM(S): at 05:34

## 2017-09-11 RX ADMIN — Medication 40 MILLIEQUIVALENT(S): at 13:03

## 2017-09-11 RX ADMIN — AMLODIPINE BESYLATE 10 MILLIGRAM(S): 2.5 TABLET ORAL at 05:34

## 2017-09-11 RX ADMIN — Medication 1 TABLET(S): at 17:33

## 2017-09-11 RX ADMIN — Medication 125 MILLIGRAM(S): at 22:34

## 2017-09-11 RX ADMIN — Medication 325 MILLIGRAM(S): at 13:03

## 2017-09-11 RX ADMIN — SIMVASTATIN 10 MILLIGRAM(S): 20 TABLET, FILM COATED ORAL at 22:34

## 2017-09-11 RX ADMIN — Medication 100 MILLIGRAM(S): at 16:01

## 2017-09-11 RX ADMIN — ISOSORBIDE MONONITRATE 120 MILLIGRAM(S): 60 TABLET, EXTENDED RELEASE ORAL at 13:03

## 2017-09-11 RX ADMIN — Medication 25 MILLIGRAM(S): at 17:33

## 2017-09-11 RX ADMIN — PRAMIPEXOLE DIHYDROCHLORIDE 0.12 MILLIGRAM(S): 0.12 TABLET ORAL at 13:03

## 2017-09-11 RX ADMIN — GABAPENTIN 300 MILLIGRAM(S): 400 CAPSULE ORAL at 13:04

## 2017-09-11 RX ADMIN — Medication 0.5 MILLIGRAM(S): at 19:39

## 2017-09-11 NOTE — PROGRESS NOTE ADULT - ASSESSMENT
81 y/o male with h/o CAD s/p stents (LAD, RCA bare metal around 9/16), PVD (RLE stent/ angioplasty and surgical debridement 5/20, right lower extremity amputation), A.Fib  on coumadin, Hypertension, COPD on home O2 2L, SHAYY with BIPAP, chronic diastolic CHF, Hyperlipidemia, PVD, Iron deficiency anemia, chronic back pain, Gout, BPH, CKD III . 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 Patient has not had any gross blood in stools. In ED, no melena. He is reported with persistent diarrhea.     1. Diarrheal syndrome resolving. CDAD. Lower GI bleeding resolving. Anemia, likely iron deficiency anemia.  -anemia is improved s/p PRBC  -improving  -contact isolation  -on vancomycin 125 mg PO q6h # 6  -tolerating abx well so far; no side effects noted  -continue abx coverage  -monitor temps  -GI evaluation appreciated  -f/u CBC  -supportive care  2. Other issues:   -care per medicine

## 2017-09-11 NOTE — PROGRESS NOTE ADULT - ASSESSMENT
82 year old male with history of CAD s/p stents (LAD, RCA bare metal around 9/16),PVD (RLE stent/ angioplasty and surgical debridement by Dr Siu 5/20,right lower extremity amputation ) A.Fib  on coumadin, Hypertension, COPD on home O2 2L, SHAYY on history of  nocturnal BIPAP, ex-smoker (smoked 1ppd X 50 years, quit 22 years ago),  Chronic diastolic CHF, Hyperlipidemia, PVD, Iron deficiency anemia, Chronic back pain, Gout, BPH, CKD III . 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 Patient has been in rehab now for 5 months           # Rectal bleeding, source unclear,   suspected upper  GI bleed , also has perirectal superficial skin tear  with supratherapiutic INR  - positive occult blood in ED, FOBT +    -s/p 2 units PRBC 9/7/17, 1 unit of PRBC 9/8/17  - Had melanotic stool 9/10  with drop in H/H- 1uPRBC  - H/H posttransfusion improved   - continue hold coumadin and Plavix,  - on   heparin sq prophylactic, off ASA for now   -S/p  push enteroscopy today   - D/w DR Hernández, 2 MVAs clipped, friable gastric mucosa, oozing on touch unlikely will tolerate A/c, SVC filter placement?   - Repeat UE Doppler ordered, d/w Radiologist, + old thrombus and new noted.   - Dr Wolff called ( Dr Clement  away) will evaluate Pt and review images.     # Acute on chronic diarrhea due to C diff colitis, resolved   - C/w PO vanco  - C diff +  - stool cx, o& p  - stop fenofibrate, hydralazine  - ID f/u appreciated       # Coagulapathy   - hold coumadin    - PT/INR daily      # ARYA on CKD stage 3  -on  IV hydration  -Cr improving   - renal f/u appreciated     # Hyperchloremic Hypernatremia likely due to dehydration   -c/w D5W  - Oral hydration   - Will d/c fluids in am if oral intake appropriate     # Hypokalemia, replace   -recheck in am     #hx of Afib/CAD /stents,   Peripheral arterial disease, DVT/IVC filter  - Hold a/c and antiPlts for now   - DR Wolff eval pending     #  Chronic diastolic heart failure  stable  - euvolemic at present   - cardio consult input noted        #DVT prophylaxis - heparin sq    Dispo:  - IV PPI, h/h monitoring, eval for possible IVC filter

## 2017-09-11 NOTE — PROGRESS NOTE ADULT - SUBJECTIVE AND OBJECTIVE BOX
Subjective:  Patient is a 82y old  Male who presents with a chief complaint of bright blood at rectal area    HPI:         82 year old male with history of CAD s/p stents (LAD, RCA bare metal around 9/16),PVD (RLE stent/ angioplasty and surgical debridement by Dr Siu 5/20,right lower extremity amputation ) A.Fib  on coumadin, Hypertension, COPD on home O2 2L, SHAYY on history of  nocturnal BIPAP, ex-smoker (smoked 1ppd X 50 years, quit 22 years ago),  Chronic diastolic CHF, Hyperlipidemia, PVD, Iron deficiency anemia, Chronic back pain, Gout, BPH, CKD III . 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 Patient has been in rehab now for 5 months   In ED - no gross blood in stools, no cookie ,  ED physician notes stool guaic positive ,on rectal exam done by ED physician there was some perianal skin tear and brown stools , INR  was 4 , reports chronic diarrhea 10 times a day for which he takes loperamide prn and lactobacillus    9/9/17: Chart reviewed, Pt was seen and examined reports feeling fine today, no diarrhea, no episodes of bleeding, tolerates diet. Pt reports no SOB or CP. States that agrees on procedure and understands risk   9/10/17: Pt was seen and examined, reports episode of dark stool overnight, H/H lower today, started on PRBCs. Has no other complains. POC discussed  9/11/17: Pt was seen and examined, S/p push enteroscopy results discussed also risk of bleeding with coagulation and possible PE with recent DVT discussed. Pt wants to be evaluated for SVC filer. No complains, tolerated diet after procedure.     Review of system- Rest of the review of system are negative except mentioned in HPI    OBJECTIVE:   Vital Signs Last 24 Hrs  T(C): 36.7 (11 Sep 2017 17:00), Max: 36.7 (10 Sep 2017 20:41)  T(F): 98.1 (11 Sep 2017 17:00), Max: 98.1 (10 Sep 2017 20:41)  HR: 60 (11 Sep 2017 19:39) (59 - 69)  BP: 145/41 (11 Sep 2017 17:00) (127/46 - 155/42)  RR: 24 (11 Sep 2017 17:00) (18 - 24)  SpO2: 97% (11 Sep 2017 17:00) (97% - 100%)          PHYSICAL EXAM:  GENERAL: NAD  NERVOUS SYSTEM:  Alert & Oriented X3, non- focal exam  HEAD:  Atraumatic, Normocephalic  EYES: EOMI, PERRLA, conjunctiva and sclera clear  HEENT: Moist mucous membranes  NECK: Supple, No JVD  CHEST/LUNG: Clear to auscultation bilaterally; No rales, no rhonchi, no wheezing, or rubs  HEART: Regular rate and rhythm; No murmurs, rubs, or gallops  ABDOMEN: Soft, Nontender, mildly distended; Bowel sounds present  GENITOURINARY- Voiding, no suprapubic tenderness  EXTREMITIES:  - chronic LLE edema 1+, RLE amputation  MUSCULOSKELETAL: No muscle tenderness, Muscle tone normal, No joint tenderness, no Joint swelling, Joint range of motion-normal  SKIN-no rash, no lesion    LABS:                                 8.5    5.4   )-----------( 206      ( 11 Sep 2017 05:58 )             26.3     09-11    144  |  111<H>  |  45<H>  ----------------------------<  92  3.3<L>   |  29  |  1.34<H>    Ca    8.0<L>      11 Sep 2017 05:58      PT/INR - ( 10 Sep 2017 07:18 )   PT: 13.3 sec;   INR: 1.23 ratio      PTT - ( 10 Sep 2017 07:18 )  PTT:32.3 sec        RECENT CULTURES:  Clostridium difficile Toxin by PCR (09.05.17 @ 12:30)    C Diff by PCR Result: Detected      Culture - Urine (09.03.17 @ 20:28)    Specimen Source: .Urine None    Culture Results:   No growth      RADIOLOGY & ADDITIONAL TESTS:      < from: Xray Chest 1 View AP/PA. (09.03.17 @ 18:47) >  Stable diffuse reticulonodular opacities likely consistent with pulmonary   vascular congestion. More prominent airspace opacity with air   bronchograms seen projecting over the left ventricular apex. Consider PA   and lateral views or CT of the chest. Follow to resolution.        MEDICATIONS  (STANDING):  ferrous    sulfate 325 milliGRAM(s) Oral daily  metoprolol 25 milliGRAM(s) Oral two times a day  pramipexole 0.125 milliGRAM(s) Oral daily  lactobacillus acidophilus 1 Tablet(s) Oral two times a day with meals  allopurinol 100 milliGRAM(s) Oral daily  buDESOnide   0.5 milliGRAM(s) Respule 0.5 milliGRAM(s) Inhalation every 12 hours  doxazosin 8 milliGRAM(s) Oral at bedtime  isosorbide   mononitrate ER Tablet (IMDUR) 120 milliGRAM(s) Oral daily  gabapentin 300 milliGRAM(s) Oral daily  simvastatin 10 milliGRAM(s) Oral at bedtime  amLODIPine   Tablet 10 milliGRAM(s) Oral daily  vancomycin    Solution 125 milliGRAM(s) Oral every 6 hours  pantoprazole Infusion 8 mG/Hr (10 mL/Hr) IV Continuous <Continuous>  dextrose 5%. 1000 milliLiter(s) (50 mL/Hr) IV Continuous <Continuous>    MEDICATIONS  (PRN):  ALBUTerol/ipratropium for Nebulization 3 milliLiter(s) Nebulizer every 4 hours PRN Shortness of Breath and/or Wheezing  acetaminophen   Tablet 500 milliGRAM(s) Oral every 4 hours PRN For Temp greater than 38 C (100.4 F)  zinc oxide 40%/lanolin Ointment 1 Application(s) Topical every 3 hours PRN diarrhea episodes

## 2017-09-11 NOTE — PROGRESS NOTE ADULT - SUBJECTIVE AND OBJECTIVE BOX
Patient is a 82y old  Male who presents with a chief complaint of bright blood at rectal area (04 Sep 2017 01:34)    HPI:  81 y/o male with h/o CAD s/p stents (LAD, RCA bare metal around 9/16), PVD (RLE stent/ angioplasty and surgical debridement 5/20, right lower extremity amputation), A.Fib  on coumadin, Hypertension, COPD on home O2 2L, SHAYY with BIPAP, chronic diastolic CHF, Hyperlipidemia, PVD, Iron deficiency anemia, chronic back pain, Gout, BPH, CKD III . 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 Patient has not had any gross blood in stools. In ED, no melena. He is reported with persistent diarrhea.     Loose stools improved  No pain  No fever or chills    MEDICATIONS  (STANDING):  ferrous    sulfate 325 milliGRAM(s) Oral daily  metoprolol 25 milliGRAM(s) Oral two times a day  pramipexole 0.125 milliGRAM(s) Oral daily  lactobacillus acidophilus 1 Tablet(s) Oral two times a day with meals  allopurinol 100 milliGRAM(s) Oral daily  buDESOnide   0.5 milliGRAM(s) Respule 0.5 milliGRAM(s) Inhalation every 12 hours  doxazosin 8 milliGRAM(s) Oral at bedtime  isosorbide   mononitrate ER Tablet (IMDUR) 120 milliGRAM(s) Oral daily  gabapentin 300 milliGRAM(s) Oral daily  simvastatin 10 milliGRAM(s) Oral at bedtime  amLODIPine   Tablet 10 milliGRAM(s) Oral daily  vancomycin    Solution 125 milliGRAM(s) Oral every 6 hours  pantoprazole Infusion 8 mG/Hr (10 mL/Hr) IV Continuous <Continuous>  dextrose 5%. 1000 milliLiter(s) (50 mL/Hr) IV Continuous <Continuous>    MEDICATIONS  (PRN):  ALBUTerol/ipratropium for Nebulization 3 milliLiter(s) Nebulizer every 4 hours PRN Shortness of Breath and/or Wheezing  acetaminophen   Tablet 500 milliGRAM(s) Oral every 4 hours PRN For Temp greater than 38 C (100.4 F)  zinc oxide 40%/lanolin Ointment 1 Application(s) Topical every 3 hours PRN diarrhea episodes      Vital Signs Last 24 Hrs  T(C): 36.3 (11 Sep 2017 11:11), Max: 37.2 (10 Sep 2017 18:20)  T(F): 97.4 (11 Sep 2017 11:11), Max: 98.9 (10 Sep 2017 18:20)  HR: 69 (11 Sep 2017 11:11) (59 - 73)  BP: 150/37 (11 Sep 2017 11:11) (127/46 - 155/42)  BP(mean): --  RR: 18 (11 Sep 2017 11:11) (18 - 18)  SpO2: 100% (11 Sep 2017 11:11) (97% - 100%)    Physical Exam:        Constitutional: frail looking  HEENT: NC/AT, EOMI, PERRLA  Neck: supple  Back: no tenderness  Respiratory: decreased BS at bases  Cardiovascular: S1S2 regular, no murmurs  Abdomen: soft, not tender, not distended, positive BS  Genitourinary: deferred  Rectal: deferred  Musculoskeletal: no muscle tenderness, no joint swelling or tenderness  Extremities: no pedal edema  Neurological: AxOx3, moving all extremities, no focal deficits  Skin: no rashes    Labs:                        7.9    5.6   )-----------( 214      ( 10 Sep 2017 07:14 )             24.5     09-10    144  |  113<H>  |  53<H>  ----------------------------<  115<H>  3.7   |  27  |  1.58<H>    Ca    7.8<L>      10 Sep 2017 07:18             Cultures:                         7.3    4.6   )-----------( 208      ( 08 Sep 2017 07:48 )             22.7     09-08    148<H>  |  117<H>  |  68<H>  ----------------------------<  100<H>  3.9   |  25  |  1.42<H>    Ca    8.1<L>      08 Sep 2017 07:48             Cultures:                         8.2    5.9   )-----------( 216      ( 07 Sep 2017 09:08 )             25.2     09-07    150<H>  |  118<H>  |  74<H>  ----------------------------<  99  4.1   |  24  |  1.33<H>    Ca    8.1<L>      07 Sep 2017 09:08                            6.5    x     )-----------( x        ( 06 Sep 2017 10:04 )             20.3     09-06    148<H>  |  116<H>  |  56<H>  ----------------------------<  97  4.6   |  24  |  1.52<H>    Ca    7.6<L>      06 Sep 2017 07:47  Mg     1.9     09-05               Radiology:    < from: Xray Chest 1 View AP/PA. (09.03.17 @ 18:47) >  Stable diffuse reticulonodular opacities likely consistent with pulmonary   vascular congestion. More prominent airspace opacity with air   bronchograms seen projecting over the left ventricular apex. Consider PA   and lateral views or CT of the chest. Follow to resolution.    < end of copied text >      Advanced directives addressed: full resuscitation

## 2017-09-11 NOTE — PROGRESS NOTE ADULT - ASSESSMENT
83 y/o with hx of CKD stage 3/4 with recent new baseline scr of 1.6-1.7 presents with BRBPR with hx of GI bleed.  Now with worsening anemia and being evaluated for Acute blood loss anemia.  Pt has been receiving epogen and po Iron as outpt at [facility]    PLAN  - dc IVF, pt at his baseline renal function and is usually with mild hypernatremia  - hold lasix for now and will moniter when need to start this  - transfusion as per hospitalist/ GI  - will hold off on epogen for now, get iron studies, ferritin, retic etc.  Pt has a hx of chronically low hgb (8-9) recently and was attributed to GI loss        9/7  daughter at bedside   feels well  + C dif on po vanco   daughter questioning the Lasix use  got 1 u prbc last night for hgb 6.9  d/w Pts daughter, labs pending    9/8 MK  - CKD stage 3 stable with chronic mild hypernatremia, encourage po intake  - GIB; for transfusion today.  and will give lasix iv x1   family updated at bedside.    -    9/9 MK  - Ckd stage 3 at baseline with mild hyperntremia: po intake  - GIB bleed: h/h stable today and prn lasix    9/10 MK  - Stable CKD stage 3 with baseline hypernatremia: stable   - GIB bleed s/p transfusion today, for push enterscopy tomorrow  lasix iv today  - cdad: improving on po vanc    9/11  - Stable ckd and stable and improved hypernatremia: po intkae  - GIB s/p push enterscopy  - CDAD: improving

## 2017-09-11 NOTE — PROGRESS NOTE ADULT - SUBJECTIVE AND OBJECTIVE BOX
NEPHROLOGY INTERVAL HPI/OVERNIGHT EVENTS:  s/p push enteroscopy          MEDICATIONS  (STANDING):  ferrous    sulfate 325 milliGRAM(s) Oral daily  metoprolol 25 milliGRAM(s) Oral two times a day  pramipexole 0.125 milliGRAM(s) Oral daily  lactobacillus acidophilus 1 Tablet(s) Oral two times a day with meals  allopurinol 100 milliGRAM(s) Oral daily  buDESOnide   0.5 milliGRAM(s) Respule 0.5 milliGRAM(s) Inhalation every 12 hours  doxazosin 8 milliGRAM(s) Oral at bedtime  isosorbide   mononitrate ER Tablet (IMDUR) 120 milliGRAM(s) Oral daily  gabapentin 300 milliGRAM(s) Oral daily  simvastatin 10 milliGRAM(s) Oral at bedtime  amLODIPine   Tablet 10 milliGRAM(s) Oral daily  vancomycin    Solution 125 milliGRAM(s) Oral every 6 hours  pantoprazole Infusion 8 mG/Hr (10 mL/Hr) IV Continuous <Continuous>  dextrose 5%. 1000 milliLiter(s) (50 mL/Hr) IV Continuous <Continuous>    MEDICATIONS  (PRN):  ALBUTerol/ipratropium for Nebulization 3 milliLiter(s) Nebulizer every 4 hours PRN Shortness of Breath and/or Wheezing  acetaminophen   Tablet 500 milliGRAM(s) Oral every 4 hours PRN For Temp greater than 38 C (100.4 F)  zinc oxide 40%/lanolin Ointment 1 Application(s) Topical every 3 hours PRN diarrhea episodes      Allergies    Zosyn (Rash)    Intolerances        I&O's Detail    10 Sep 2017 07:  -  11 Sep 2017 07:00  --------------------------------------------------------  IN:    Packed Red Blood Cells: 337 mL  Total IN: 337 mL    OUT:    Voided: 1050 mL  Total OUT: 1050 mL    Total NET: -713 mL      11 Sep 2017 07:  -  11 Sep 2017 15:00  --------------------------------------------------------  IN:    Oral Fluid: 240 mL  Total IN: 240 mL    OUT:    Voided: 100 mL  Total OUT: 100 mL    Total NET: 140 mL            Vital Signs Last 24 Hrs  T(C): 36.3 (11 Sep 2017 11:11), Max: 37.2 (10 Sep 2017 18:20)  T(F): 97.4 (11 Sep 2017 11:11), Max: 98.9 (10 Sep 2017 18:20)  HR: 69 (11 Sep 2017 11:11) (59 - 73)  BP: 150/37 (11 Sep 2017 11:11) (127/46 - 155/42)  BP(mean): --  RR: 18 (11 Sep 2017 11:11) (18 - 18)  SpO2: 100% (11 Sep 2017 11:11) (97% - 100%)  Daily     Daily Weight in k.1 (11 Sep 2017 03:30)    PHYSICAL EXAM:  General: alert. awake Ox3  HEENT: MMM  CV: s1s2 rrr  LUNGS: B/L CTA  EXT: no edema, no sacral edema    LABS:                        8.5    5.4   )-----------( 206      ( 11 Sep 2017 05:58 )             26.3     09-11    144  |  111<H>  |  45<H>  ----------------------------<  92  3.3<L>   |  29  |  1.34<H>    Ca    8.0<L>      11 Sep 2017 05:58      PT/INR - ( 10 Sep 2017 07:18 )   PT: 13.3 sec;   INR: 1.23 ratio         PTT - ( 10 Sep 2017 07:18 )  PTT:32.3 sec

## 2017-09-12 LAB
ANION GAP SERPL CALC-SCNC: 7 MMOL/L — SIGNIFICANT CHANGE UP (ref 5–17)
BUN SERPL-MCNC: 42 MG/DL — HIGH (ref 7–23)
CALCIUM SERPL-MCNC: 7.8 MG/DL — LOW (ref 8.5–10.1)
CHLORIDE SERPL-SCNC: 112 MMOL/L — HIGH (ref 96–108)
CO2 SERPL-SCNC: 27 MMOL/L — SIGNIFICANT CHANGE UP (ref 22–31)
CREAT SERPL-MCNC: 1.48 MG/DL — HIGH (ref 0.5–1.3)
CULTURE RESULTS: SIGNIFICANT CHANGE UP
GLUCOSE SERPL-MCNC: 116 MG/DL — HIGH (ref 70–99)
HCT VFR BLD CALC: 27.5 % — LOW (ref 39–50)
HGB BLD-MCNC: 9.2 G/DL — LOW (ref 13–17)
INR BLD: 1.12 RATIO — SIGNIFICANT CHANGE UP (ref 0.88–1.16)
MCHC RBC-ENTMCNC: 31.7 PG — SIGNIFICANT CHANGE UP (ref 27–34)
MCHC RBC-ENTMCNC: 33.4 GM/DL — SIGNIFICANT CHANGE UP (ref 32–36)
MCV RBC AUTO: 94.6 FL — SIGNIFICANT CHANGE UP (ref 80–100)
PLATELET # BLD AUTO: 212 K/UL — SIGNIFICANT CHANGE UP (ref 150–400)
POTASSIUM SERPL-MCNC: 4 MMOL/L — SIGNIFICANT CHANGE UP (ref 3.5–5.3)
POTASSIUM SERPL-SCNC: 4 MMOL/L — SIGNIFICANT CHANGE UP (ref 3.5–5.3)
PROTHROM AB SERPL-ACNC: 12.1 SEC — SIGNIFICANT CHANGE UP (ref 9.8–12.7)
RBC # BLD: 2.9 M/UL — LOW (ref 4.2–5.8)
RBC # FLD: 14.7 % — HIGH (ref 10.3–14.5)
SODIUM SERPL-SCNC: 146 MMOL/L — HIGH (ref 135–145)
SPECIMEN SOURCE: SIGNIFICANT CHANGE UP
WBC # BLD: 6.1 K/UL — SIGNIFICANT CHANGE UP (ref 3.8–10.5)
WBC # FLD AUTO: 6.1 K/UL — SIGNIFICANT CHANGE UP (ref 3.8–10.5)

## 2017-09-12 PROCEDURE — 71010: CPT | Mod: 26

## 2017-09-12 RX ORDER — PANTOPRAZOLE SODIUM 20 MG/1
40 TABLET, DELAYED RELEASE ORAL EVERY 12 HOURS
Qty: 0 | Refills: 0 | Status: DISCONTINUED | OUTPATIENT
Start: 2017-09-12 | End: 2017-09-14

## 2017-09-12 RX ORDER — MULTIVIT WITH MIN/MFOLATE/K2 340-15/3 G
300 POWDER (GRAM) ORAL DAILY
Qty: 0 | Refills: 0 | Status: DISCONTINUED | OUTPATIENT
Start: 2017-09-12 | End: 2017-09-12

## 2017-09-12 RX ADMIN — Medication 0.5 MILLIGRAM(S): at 19:57

## 2017-09-12 RX ADMIN — Medication 1 TABLET(S): at 07:50

## 2017-09-12 RX ADMIN — PRAMIPEXOLE DIHYDROCHLORIDE 0.12 MILLIGRAM(S): 0.12 TABLET ORAL at 11:37

## 2017-09-12 RX ADMIN — Medication 325 MILLIGRAM(S): at 11:37

## 2017-09-12 RX ADMIN — Medication 1 TABLET(S): at 17:40

## 2017-09-12 RX ADMIN — Medication 25 MILLIGRAM(S): at 17:40

## 2017-09-12 RX ADMIN — Medication 0.5 MILLIGRAM(S): at 10:11

## 2017-09-12 RX ADMIN — Medication 8 MILLIGRAM(S): at 23:04

## 2017-09-12 RX ADMIN — SIMVASTATIN 10 MILLIGRAM(S): 20 TABLET, FILM COATED ORAL at 22:23

## 2017-09-12 RX ADMIN — Medication 125 MILLIGRAM(S): at 23:04

## 2017-09-12 RX ADMIN — Medication 125 MILLIGRAM(S): at 17:40

## 2017-09-12 RX ADMIN — Medication 125 MILLIGRAM(S): at 05:48

## 2017-09-12 RX ADMIN — ISOSORBIDE MONONITRATE 120 MILLIGRAM(S): 60 TABLET, EXTENDED RELEASE ORAL at 11:37

## 2017-09-12 RX ADMIN — Medication 3 MILLILITER(S): at 10:08

## 2017-09-12 RX ADMIN — AMLODIPINE BESYLATE 10 MILLIGRAM(S): 2.5 TABLET ORAL at 05:47

## 2017-09-12 RX ADMIN — Medication 100 MILLIGRAM(S): at 11:37

## 2017-09-12 RX ADMIN — PANTOPRAZOLE SODIUM 40 MILLIGRAM(S): 20 TABLET, DELAYED RELEASE ORAL at 17:41

## 2017-09-12 RX ADMIN — Medication 25 MILLIGRAM(S): at 05:48

## 2017-09-12 RX ADMIN — Medication 125 MILLIGRAM(S): at 11:37

## 2017-09-12 RX ADMIN — GABAPENTIN 300 MILLIGRAM(S): 400 CAPSULE ORAL at 11:37

## 2017-09-12 NOTE — PROVIDER CONTACT NOTE (OTHER) - DATE AND TIME:
06-Sep-2017 11:49
08-Sep-2017 10:26
04-Sep-2017 00:22
04-Sep-2017 02:19
04-Sep-2017 12:25
06-Sep-2017 02:20
06-Sep-2017 08:28
07-Sep-2017 21:50
09-Sep-2017 15:46
12-Sep-2017 20:18
06-Sep-2017 10:55

## 2017-09-12 NOTE — PROGRESS NOTE ADULT - SUBJECTIVE AND OBJECTIVE BOX
Subjective:  Patient is a 82y old  Male who presents with a chief complaint of bright blood at rectal area    HPI:         82 year old male with history of CAD s/p stents (LAD, RCA bare metal around 9/16),PVD (RLE stent/ angioplasty and surgical debridement by Dr Siu 5/20,right lower extremity amputation ) A.Fib  on coumadin, Hypertension, COPD on home O2 2L, SHAYY on history of  nocturnal BIPAP, ex-smoker (smoked 1ppd X 50 years, quit 22 years ago),  Chronic diastolic CHF, Hyperlipidemia, PVD, Iron deficiency anemia, Chronic back pain, Gout, BPH, CKD III . 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 Patient has been in rehab now for 5 months   In ED - no gross blood in stools, no cookie ,  ED physician notes stool guaic positive ,on rectal exam done by ED physician there was some perianal skin tear and brown stools , INR  was 4 , reports chronic diarrhea 10 times a day for which he takes loperamide prn and lactobacillus    9/9/17: Chart reviewed, Pt was seen and examined reports feeling fine today, no diarrhea, no episodes of bleeding, tolerates diet. Pt reports no SOB or CP. States that agrees on procedure and understands risk   9/10/17: Pt was seen and examined, reports episode of dark stool overnight, H/H lower today, started on PRBCs. Has no other complains. POC discussed  9/11/17: Pt was seen and examined, S/p push enteroscopy results discussed also risk of bleeding with coagulation and possible PE with recent DVT discussed. Pt wants to be evaluated for SVC filer. No complains, tolerated diet after procedure.   9/12/17: Pt was seen and examined, resting comfortably, no complains. Tolerates diet. Had semiformed stool today. No fevers. no SOB. POC discussed     Review of system- Rest of the review of system are negative except mentioned in HPI    OBJECTIVE:   Vital Signs Last 24 Hrs  T(C): 36.4 (12 Sep 2017 11:29), Max: 36.7 (11 Sep 2017 17:00)  T(F): 97.6 (12 Sep 2017 11:29), Max: 98.1 (11 Sep 2017 17:00)  HR: 76 (12 Sep 2017 11:29) (53 - 76)  BP: 150/44 (12 Sep 2017 11:29) (142/52 - 150/44)  RR: 18 (12 Sep 2017 11:29) (17 - 24)  SpO2: 94% (12 Sep 2017 11:29) (94% - 98%)      PHYSICAL EXAM:  GENERAL: NAD  NERVOUS SYSTEM:  Alert & Oriented X3, non- focal exam  HEAD:  Atraumatic, Normocephalic  EYES: EOMI, PERRLA, conjunctiva and sclera clear  HEENT: Moist mucous membranes  NECK: Supple, No JVD  CHEST/LUNG: Clear to auscultation bilaterally; No rales, no rhonchi, no wheezing, or rubs  HEART: Regular rate and rhythm; No murmurs, rubs, or gallops  ABDOMEN: Soft, Nontender, mildly distended; Bowel sounds present  GENITOURINARY- Voiding, no suprapubic tenderness  EXTREMITIES:  - chronic LLE edema 1+, RLE amputation  MUSCULOSKELETAL: No muscle tenderness, Muscle tone normal, No joint tenderness, no Joint swelling, Joint range of motion-normal  SKIN- LLE with  macular hemorrhagic rash, mildly macerated skin    LABS:                                            9.2    6.1   )-----------( 212      ( 12 Sep 2017 07:14 )             27.5     09-12    146<H>  |  112<H>  |  42<H>  ----------------------------<  116<H>  4.0   |  27  |  1.48<H>    Ca    7.8<L>      12 Sep 2017 07:14    PT/INR - ( 12 Sep 2017 07:14 )   PT: 12.1 sec;   INR: 1.12 ratio        RECENT CULTURES:  Clostridium difficile Toxin by PCR (09.05.17 @ 12:30)    C Diff by PCR Result: Detected      Culture - Urine (09.03.17 @ 20:28)    Specimen Source: .Urine None    Culture Results:   No growth      RADIOLOGY & ADDITIONAL TESTS:      < from: Xray Chest 1 View AP/PA. (09.03.17 @ 18:47) >  Stable diffuse reticulonodular opacities likely consistent with pulmonary   vascular congestion. More prominent airspace opacity with air   bronchograms seen projecting over the left ventricular apex. Consider PA   and lateral views or CT of the chest. Follow to resolution.        MEDICATIONS  (STANDING):  ferrous    sulfate 325 milliGRAM(s) Oral daily  metoprolol 25 milliGRAM(s) Oral two times a day  pramipexole 0.125 milliGRAM(s) Oral daily  lactobacillus acidophilus 1 Tablet(s) Oral two times a day with meals  allopurinol 100 milliGRAM(s) Oral daily  buDESOnide   0.5 milliGRAM(s) Respule 0.5 milliGRAM(s) Inhalation every 12 hours  doxazosin 8 milliGRAM(s) Oral at bedtime  isosorbide   mononitrate ER Tablet (IMDUR) 120 milliGRAM(s) Oral daily  gabapentin 300 milliGRAM(s) Oral daily  simvastatin 10 milliGRAM(s) Oral at bedtime  amLODIPine   Tablet 10 milliGRAM(s) Oral daily  vancomycin    Solution 125 milliGRAM(s) Oral every 6 hours  pantoprazole  Injectable 40 milliGRAM(s) IV Push every 12 hours    MEDICATIONS  (PRN):  ALBUTerol/ipratropium for Nebulization 3 milliLiter(s) Nebulizer every 4 hours PRN Shortness of Breath and/or Wheezing  acetaminophen   Tablet 500 milliGRAM(s) Oral every 4 hours PRN For Temp greater than 38 C (100.4 F)  zinc oxide 40%/lanolin Ointment 1 Application(s) Topical every 3 hours PRN diarrhea episodes

## 2017-09-12 NOTE — PROGRESS NOTE ADULT - ASSESSMENT
81 y/o with hx of CKD stage 3/4 with recent new baseline scr of 1.6-1.7 presents with BRBPR with hx of GI bleed.  Now with worsening anemia and being evaluated for Acute blood loss anemia.  Pt has been receiving epogen and po Iron as outpt at [facility]    PLAN  - dc IVF, pt at his baseline renal function and is usually with mild hypernatremia  - hold lasix for now and will moniter when need to start this  - transfusion as per hospitalist/ GI  - will hold off on epogen for now, get iron studies, ferritin, retic etc.  Pt has a hx of chronically low hgb (8-9) recently and was attributed to GI loss        9/7  daughter at bedside   feels well  + C dif on po vanco   daughter questioning the Lasix use  got 1 u prbc last night for hgb 6.9  d/w Pts daughter, labs pending    9/8 MK  - CKD stage 3 stable with chronic mild hypernatremia, encourage po intake  - GIB; for transfusion today.  and will give lasix iv x1   family updated at bedside.    -    9/9 MK  - Ckd stage 3 at baseline with mild hyperntremia: po intake  - GIB bleed: h/h stable today and prn lasix    9/10 MK  - Stable CKD stage 3 with baseline hypernatremia: stable   - GIB bleed s/p transfusion today, for push enterscopy tomorrow  lasix iv today  - cdad: improving on po vanc    9/11  - Stable ckd and stable and improved hypernatremia: po intkae  - GIB s/p push enterscopy  - CDAD: improving      9/12 SY  --CKD   Stable renal parameters.  Continue to monitor .   Pt does have chronic mild Hypernatremia due to continued need for diuretics.  --GIB  post EGD noted with AVM and gastritis. --Hemoclips placed.  Continue to follow HGB  --CDAD  continue po vanco.

## 2017-09-12 NOTE — PROGRESS NOTE ADULT - SUBJECTIVE AND OBJECTIVE BOX
NEPHROLOGY INTERVAL HPI/OVERNIGHT EVENTS:  9/12 SY  No acute events overnight.   Feeling well.  s/p endoscopy.  tolerated the procedure well.    HPI:  82 year old male with hx of CKD stage 3/4 ( new baseline of 1.6-1.7) presetns after hd was noted to have blood when wiping.  Has been having loose stools that has been contributing to soreness in his rectal region.  + guaic pos in ER.  no documended melena.  In the SNF has been receiving EPOgen 20k bimonthly with hgb of 7.5 on 8/28.  Pre hospitalization in earlier part of year, pt has hgb that were 8-9 without epogen.    This AM now with drop in his hgb and receiving PRBC transfusion.  Has noted with hgb of 7 this am and repeat value of 6.5 and 2 units of prbc transfusion planned.   Noted with CDiff pos this am and PO vanc started.  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,      MEDICATIONS  (STANDING):  ferrous    sulfate 325 milliGRAM(s) Oral daily  metoprolol 25 milliGRAM(s) Oral two times a day  pramipexole 0.125 milliGRAM(s) Oral daily  lactobacillus acidophilus 1 Tablet(s) Oral two times a day with meals  allopurinol 100 milliGRAM(s) Oral daily  buDESOnide   0.5 milliGRAM(s) Respule 0.5 milliGRAM(s) Inhalation every 12 hours  doxazosin 8 milliGRAM(s) Oral at bedtime  isosorbide   mononitrate ER Tablet (IMDUR) 120 milliGRAM(s) Oral daily  gabapentin 300 milliGRAM(s) Oral daily  simvastatin 10 milliGRAM(s) Oral at bedtime  amLODIPine   Tablet 10 milliGRAM(s) Oral daily  vancomycin    Solution 125 milliGRAM(s) Oral every 6 hours  dextrose 5%. 1000 milliLiter(s) (50 mL/Hr) IV Continuous <Continuous>  pantoprazole  Injectable 40 milliGRAM(s) IV Push every 12 hours    MEDICATIONS  (PRN):  ALBUTerol/ipratropium for Nebulization 3 milliLiter(s) Nebulizer every 4 hours PRN Shortness of Breath and/or Wheezing  acetaminophen   Tablet 500 milliGRAM(s) Oral every 4 hours PRN For Temp greater than 38 C (100.4 F)  zinc oxide 40%/lanolin Ointment 1 Application(s) Topical every 3 hours PRN diarrhea episodes          Vital Signs Last 24 Hrs  T(C): 36.4 (12 Sep 2017 11:29), Max: 36.7 (11 Sep 2017 17:00)  T(F): 97.6 (12 Sep 2017 11:29), Max: 98.1 (11 Sep 2017 17:00)  HR: 76 (12 Sep 2017 11:29) (53 - 76)  BP: 150/44 (12 Sep 2017 11:29) (142/52 - 150/44)  BP(mean): --  RR: 18 (12 Sep 2017 11:29) (17 - 24)  SpO2: 94% (12 Sep 2017 11:29) (94% - 98%)  Daily     Daily     09-11 @ 07:01 - 09-12 @ 07:00  --------------------------------------------------------  IN: 240 mL / OUT: 575 mL / NET: -335 mL    09-12 @ 07:01  -  09-12 @ 12:14  --------------------------------------------------------  IN: 480 mL / OUT: 100 mL / NET: 380 mL        PHYSICAL EXAM:  Alert and appropriate  GENERAL: No acute distress  CHEST/LUNG: fair air entry  HEART: S1S2 RRR  ABDOMEN: soft  EXTREMITIES: chronic venous stasis.  SKIN:     LABS:                        9.2    6.1   )-----------( 212      ( 12 Sep 2017 07:14 )             27.5     09-12    146<H>  |  112<H>  |  42<H>  ----------------------------<  116<H>  4.0   |  27  |  1.48<H>    Ca    7.8<L>      12 Sep 2017 07:14      PT/INR - ( 12 Sep 2017 07:14 )   PT: 12.1 sec;   INR: 1.12 ratio             RADIOLOGY & ADDITIONAL TESTS:

## 2017-09-12 NOTE — PROVIDER CONTACT NOTE (OTHER) - SITUATION
Keyona Johnson called from the lab with a critical H&H 7.0/22.3
Call from lab [facility]  Repeated H&H resulted at 6.5/20.3
Called regarding cardiology consult, Left message with service. ( spoke to Anamika)
Patient admitted with GI bleed. Not actively bleeding at this time. Also positive CDIFF.
SERVICE CALLED, MD AWARE OF CONSULT
admitted to r/o GI bleed -  hx of CAD/PAD/Afib.please evaluate for management of antociagulation/antiplatelet
called dr bush service spoke with willie
called dr carvajal service spoke with chuck

## 2017-09-12 NOTE — CONSULT NOTE ADULT - SUBJECTIVE AND OBJECTIVE BOX
Patient is a 82y old  Male who presents with a chief complaint of bright blood at rectal area (04 Sep 2017 01:34)      HPI:  82 year old male with history of CAD s/p stents (LAD, RCA bare metal around 9/16),PVD (RLE stent/ angioplasty and surgical debridment by Dr Siu 5/20,right lower extremity amputation ) A.Fib  on coumadin, Hypertension, COPD on home O2 2L, SHAYY onhistory of  nocturnal BIPAP, ex-smoker (smoked 1ppd X 50 years, quit 22 years ago),  Chronic diastolic CHF, Hyperlipidemia, PVD, Iron deficiency anemia, Chronic back pain, Gout, BPH, CKD III . 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  Per ED physician notes stool guaic positive ,on rectal exam done by ED physician there was some perianal skin tear and brown stools ,no gross blood in stools  INR in ED was 4   no active bleeding in ED ,no vitamin K given at this time  CXR no changes from 6/29/2017,no infiltrate or pleural effusion or pulmonary vascular congestion  patient is afebrile and asymptomatic in ED  reports chronic diarrhea 10 times a day for which he takes loperamide prn and lactobacillus  9/12  pt is seen and examined with his dtr at bedside  no distress  no active cough  Pmhx- see HPI  Pshx right rotator cuff repair, pilonydal cyst, RLE stent/angioplasty/surgical debridement,amputation,IVC filter  Family hx- rectal ca,HTN,DM  social hx- exsmoker quit 22 years ago,rare etoh use     ROS- patient denies any CP,SOB,abdominal pain,cough,fever,nausea,vomiting,dizziness,palpitations (03 Sep 2017 23:52)      PAST MEDICAL & SURGICAL HISTORY:  Diastolic CHF  Peripheral vascular disease  Afib  Anemia  CKD (chronic kidney disease)  COPD (chronic obstructive pulmonary disease)  SHAYY (obstructive sleep apnea)  Sepsis, due to unspecified organism: 2/2 poorly healing wounds b/l  Dyspepsia: On moderate exertion.  Sleep apnea, obstructive: Requires home 02 therapy, and treatment with BIPAP  Atelectasis  Pleural effusion, bilateral  Respiratory failure  Peripheral edema  CRI (chronic renal insufficiency)  Gout  Benign prostatic hypertrophy  Spinal stenosis  Hypercholesterolemia  GERD (gastroesophageal reflux disease)  CAD (coronary artery disease)  Hypertension  S/P angioplasty with stent  Cataract of left eye  Prostate: Surgery green light procedure.  S/P rotator cuff surgery: Right  S/P angioplasty      PREVIOUS DIAGNOSTIC TESTING:      MEDICATIONS  (STANDING):  ferrous    sulfate 325 milliGRAM(s) Oral daily  metoprolol 25 milliGRAM(s) Oral two times a day  pramipexole 0.125 milliGRAM(s) Oral daily  lactobacillus acidophilus 1 Tablet(s) Oral two times a day with meals  allopurinol 100 milliGRAM(s) Oral daily  buDESOnide   0.5 milliGRAM(s) Respule 0.5 milliGRAM(s) Inhalation every 12 hours  doxazosin 8 milliGRAM(s) Oral at bedtime  isosorbide   mononitrate ER Tablet (IMDUR) 120 milliGRAM(s) Oral daily  gabapentin 300 milliGRAM(s) Oral daily  simvastatin 10 milliGRAM(s) Oral at bedtime  amLODIPine   Tablet 10 milliGRAM(s) Oral daily  vancomycin    Solution 125 milliGRAM(s) Oral every 6 hours  pantoprazole Infusion 8 mG/Hr (10 mL/Hr) IV Continuous <Continuous>  dextrose 5%. 1000 milliLiter(s) (50 mL/Hr) IV Continuous <Continuous>    MEDICATIONS  (PRN):  ALBUTerol/ipratropium for Nebulization 3 milliLiter(s) Nebulizer every 4 hours PRN Shortness of Breath and/or Wheezing  acetaminophen   Tablet 500 milliGRAM(s) Oral every 4 hours PRN For Temp greater than 38 C (100.4 F)  zinc oxide 40%/lanolin Ointment 1 Application(s) Topical every 3 hours PRN diarrhea episodes      FAMILY HISTORY:  No pertinent family history in first degree relatives        REVIEW OF SYSTEM:  Pertinent items are noted in HPI.  Constitutional negative for chills, fevers, sweats and weight loss  throat, and face:  negative for epistaxis, nasal congestion, sore throat and   tinnitus  Respiratory: negative for cough,pos dyspnea on exertion,  no pleuritic chest pain  and wheezing  Cardiovascular:  negative for chest pain, dyspnea and palpitations  Gastrointestinal: negative for abdominal pain, diarrhea, nausea and vomiting  Genitourinary: negative for dysuria, frequency and urinary incontinence  Skin:  negative for redness, rash, pruritus, swelling, dryness and   fissures  Hematologic/lymphatic: negative for bleeding and easy bruising  Musculoskeletal: pos for arthralgias, back pain and muscle weakness      Vital Signs Last 24 Hrs  T(C): 36.6 (12 Sep 2017 05:45), Max: 36.7 (11 Sep 2017 17:00)  T(F): 97.9 (12 Sep 2017 05:45), Max: 98.1 (11 Sep 2017 17:00)  HR: 70 (12 Sep 2017 05:45) (60 - 70)  BP: 146/46 (12 Sep 2017 05:45) (127/46 - 150/37)  BP(mean): --  RR: 17 (12 Sep 2017 05:45) (17 - 24)  SpO2: 97% (12 Sep 2017 05:45) (97% - 100%)    I&O's Summary    11 Sep 2017 07:01  -  12 Sep 2017 07:00  --------------------------------------------------------  IN: 240 mL / OUT: 575 mL / NET: -335 mL      PHYSICAL EXAM  General Appearance: cooperative, no acute distress,   HEENT: PERRL, conjunctiva clear, EOM's intact, non injected pharynx, no exudate, TM   normal  Neck: Supple, , no adenopathy, thyroid: not enlarged, no carotid bruit or JVD  Back: Symmetric, no  tenderness,no soft tissue tenderness  Lungs: Clear to auscultation bilateral,no adventitious breath sounds, normal   expiratory phase  Heart: Regular rate and rhythm, S1, S2 normal, no murmur, rub or gallop  Abdomen: Soft, non-tender, bowel sounds active , no hepatosplenomegaly  Extremities: hx right AKA      ECG:    LABS:                          9.2    6.1   )-----------( 212      ( 12 Sep 2017 07:14 )             27.5     09-12    146<H>  |  112<H>  |  42<H>  ----------------------------<  116<H>  4.0   |  27  |  1.48<H>    Ca    7.8<L>      12 Sep 2017 07:14              PT/INR - ( 12 Sep 2017 07:14 )   PT: 12.1 sec;   INR: 1.12 ratio                   RADIOLOGY & ADDITIONAL STUDIES:  < from: Xray Chest 1 View AP/PA. (09.03.17 @ 18:47) >  Impression:    Stable diffuse reticulonodular opacities likely consistent with pulmonary   vascular congestion. More prominent airspace opacity with air   bronchograms seen projecting over the left ventricular apex. Consider PA   and lateral views or CT of the chest. Follow to resolution.    < end of copied text >

## 2017-09-12 NOTE — PROGRESS NOTE ADULT - ASSESSMENT
83 y/o male with h/o CAD s/p stents (LAD, RCA bare metal around 9/16), PVD (RLE stent/ angioplasty and surgical debridement 5/20, right lower extremity amputation), A.Fib  on coumadin, Hypertension, COPD on home O2 2L, SHAYY with BIPAP, chronic diastolic CHF, Hyperlipidemia, PVD, Iron deficiency anemia, chronic back pain, Gout, BPH, CKD III . 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 Patient has not had any gross blood in stools. In ED, no melena. He is reported with persistent diarrhea.     1. Diarrheal syndrome resolved. CDAD. Lower GI bleeding resolving. Anemia, likely iron deficiency anemia.  -anemia is improved s/p PRBC  -improving  -contact isolation  -on vancomycin 125 mg PO q6h # 7  -tolerating abx well so far; no side effects noted  -continue abx coverage  -monitor temps  -GI evaluation appreciated  -f/u CBC  -supportive care  2. Other issues:   -care per medicine

## 2017-09-12 NOTE — PROGRESS NOTE ADULT - ASSESSMENT
82 year old male with history of CAD s/p stents (LAD, RCA bare metal around 9/16),PVD (RLE stent/ angioplasty and surgical debridement by Dr Siu 5/20,right lower extremity amputation ) A.Fib  on coumadin, Hypertension, COPD on home O2 2L, SHAYY on history of  nocturnal BIPAP, ex-smoker (smoked 1ppd X 50 years, quit 22 years ago),  Chronic diastolic CHF, Hyperlipidemia, PVD, Iron deficiency anemia, Chronic back pain, Gout, BPH, CKD III . 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 Patient has been in rehab now for 5 months         # Rectal bleeding  with perirectal superficial skin tear   and suspected upper  GI bleed in settings of supratherapeutic INR  - positive occult blood in ED, FOBT +    -s/p 2 units PRBC 9/7/17, 1 unit of PRBC 9/8/17  - Had melanotic stool 9/10  with drop in H/H- 1uPRBC  - H/H improved and stable   -Switch Protonix drip to IVP bid   - continue hold coumadin and Plavix,  - on   heparin sq prophylactic, off ASA for now will restart in am  - S/p  push enteroscopy   - D/w DR Hernández, 2 MVAs clipped, friable gastric mucosa, oozing on touch unlikely will tolerate A/c, SVC filter placement?   - Repeat UE Doppler ordered, d/w Radiologist, + old thrombus and new noted.   - Dr Wolff called ( Dr Clement  away) will evaluate Pt and review images.     # Acute on chronic diarrhea due to C diff colitis, resolved   - C/w PO vanco  - C diff +  - stool cx, o& p  - stop fenofibrate, hydralazine  - D/w DR Burleson, complete 14d of Vanco, continue isolation       # Coagulapathy, resolved   - off coumadin          # ARYA on CKD stage 3  -d/c   IV hydration  -Cr at baseline   - renal f/u appreciated     # Hyperchloremic Hypernatremia likely due to dehydration. Also had h/o mild chronic hypernatremia   -d/c D5W  - Monitor NA         # Hypokalemia, replaced  -monitor     #hx of Afib/CAD /stents,   Peripheral arterial disease, DVT/IVC filter  - Hold a/c and antiPlts for now   - DR New toussaint pending     #  Chronic diastolic heart failure  stable  - euvolemic at present, lasix on hold  - D/c IVF  - cardio consult input noted        #DVT prophylaxis - heparin sq    Dispo:  c/w current care DR New toussaint

## 2017-09-12 NOTE — PROVIDER CONTACT NOTE (OTHER) - NAME OF MD/NP/PA/DO NOTIFIED:
Bridgette
Consult for Dr. Lance notified; spoke with dave.
Consult for Dr. Tomas notified; spoke with Yesi.
DR. HOLT ()
Dr Howe
Dr. cShultz call service
SERAFIN Choi
dr bush
dr carvajal
Dr Howe

## 2017-09-12 NOTE — PROGRESS NOTE ADULT - SUBJECTIVE AND OBJECTIVE BOX
Subjective:  Patient is a 82y old  Male who presents with a chief complaint of bright blood at rectal area    HPI:         82 year old male with history of CAD s/p stents (LAD, RCA bare metal around 9/16),PVD (RLE stent/ angioplasty and surgical debridement by Dr Siu 5/20,right lower extremity amputation ) A.Fib  on coumadin, Hypertension, COPD on home O2 2L, SHAYY on history of  nocturnal BIPAP, ex-smoker (smoked 1ppd X 50 years, quit 22 years ago),  Chronic diastolic CHF, Hyperlipidemia, PVD, Iron deficiency anemia, Chronic back pain, Gout, BPH, CKD III . 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 Patient has been in rehab now for 5 months   In ED - no gross blood in stools, no cookie ,  ED physician notes stool guaic positive ,on rectal exam done by ED physician there was some perianal skin tear and brown stools , INR  was 4 , reports chronic diarrhea 10 times a day for which he takes loperamide prn and lactobacillus    9/9/17: Chart reviewed, Pt was seen and examined reports feeling fine today, no diarrhea, no episodes of bleeding, tolerates diet. Pt reports no SOB or CP. States that agrees on procedure and understands risk   9/10/17: Pt was seen and examined, reports episode of dark stool overnight, H/H lower today, started on PRBCs. Has no other complains. POC discussed  9/11/17: Pt was seen and examined, S/p push enteroscopy results discussed also risk of bleeding with coagulation and possible PE with recent DVT discussed. Pt wants to be evaluated for SVC filer. No complains, tolerated diet after procedure.   9/12: no new complains, results and POC  discussed    Review of system- Rest of the review of system are negative except mentioned in HPI    OBJECTIVE:   Vital Signs Last 24 Hrs  T(C): 36.7 (11 Sep 2017 17:00), Max: 36.7 (10 Sep 2017 20:41)  T(F): 98.1 (11 Sep 2017 17:00), Max: 98.1 (10 Sep 2017 20:41)  HR: 60 (11 Sep 2017 19:39) (59 - 69)  BP: 145/41 (11 Sep 2017 17:00) (127/46 - 155/42)  RR: 24 (11 Sep 2017 17:00) (18 - 24)  SpO2: 97% (11 Sep 2017 17:00) (97% - 100%)          PHYSICAL EXAM:  GENERAL: NAD  NERVOUS SYSTEM:  Alert & Oriented X3, non- focal exam  HEAD:  Atraumatic, Normocephalic  EYES: EOMI, PERRLA, conjunctiva and sclera clear  HEENT: Moist mucous membranes  NECK: Supple, No JVD  CHEST/LUNG: Clear to auscultation bilaterally; No rales, no rhonchi, no wheezing, or rubs  HEART: Regular rate and rhythm; No murmurs, rubs, or gallops  ABDOMEN: Soft, Nontender, mildly distended; Bowel sounds present  GENITOURINARY- Voiding, no suprapubic tenderness  EXTREMITIES:  - chronic LLE edema 1+, RLE amputation  MUSCULOSKELETAL: No muscle tenderness, Muscle tone normal, No joint tenderness, no Joint swelling, Joint range of motion-normal  SKIN-no rash, no lesion    LABS:                                 8.5    5.4   )-----------( 206      ( 11 Sep 2017 05:58 )             26.3     09-11    144  |  111<H>  |  45<H>  ----------------------------<  92  3.3<L>   |  29  |  1.34<H>    Ca    8.0<L>      11 Sep 2017 05:58      PT/INR - ( 10 Sep 2017 07:18 )   PT: 13.3 sec;   INR: 1.23 ratio      PTT - ( 10 Sep 2017 07:18 )  PTT:32.3 sec        RECENT CULTURES:  Clostridium difficile Toxin by PCR (09.05.17 @ 12:30)    C Diff by PCR Result: Detected      Culture - Urine (09.03.17 @ 20:28)    Specimen Source: .Urine None    Culture Results:   No growth      RADIOLOGY & ADDITIONAL TESTS:      < from: Xray Chest 1 View AP/PA. (09.03.17 @ 18:47) >  Stable diffuse reticulonodular opacities likely consistent with pulmonary   vascular congestion. More prominent airspace opacity with air   bronchograms seen projecting over the left ventricular apex. Consider PA   and lateral views or CT of the chest. Follow to resolution.        MEDICATIONS  (STANDING):  ferrous    sulfate 325 milliGRAM(s) Oral daily  metoprolol 25 milliGRAM(s) Oral two times a day  pramipexole 0.125 milliGRAM(s) Oral daily  lactobacillus acidophilus 1 Tablet(s) Oral two times a day with meals  allopurinol 100 milliGRAM(s) Oral daily  buDESOnide   0.5 milliGRAM(s) Respule 0.5 milliGRAM(s) Inhalation every 12 hours  doxazosin 8 milliGRAM(s) Oral at bedtime  isosorbide   mononitrate ER Tablet (IMDUR) 120 milliGRAM(s) Oral daily  gabapentin 300 milliGRAM(s) Oral daily  simvastatin 10 milliGRAM(s) Oral at bedtime  amLODIPine   Tablet 10 milliGRAM(s) Oral daily  vancomycin    Solution 125 milliGRAM(s) Oral every 6 hours  pantoprazole Infusion 8 mG/Hr (10 mL/Hr) IV Continuous <Continuous>  dextrose 5%. 1000 milliLiter(s) (50 mL/Hr) IV Continuous <Continuous>    MEDICATIONS  (PRN):  ALBUTerol/ipratropium for Nebulization 3 milliLiter(s) Nebulizer every 4 hours PRN Shortness of Breath and/or Wheezing  acetaminophen   Tablet 500 milliGRAM(s) Oral every 4 hours PRN For Temp greater than 38 C (100.4 F)  zinc oxide 40%/lanolin Ointment 1 Application(s) Topical every 3 hours PRN diarrhea episodes

## 2017-09-12 NOTE — PROGRESS NOTE ADULT - SUBJECTIVE AND OBJECTIVE BOX
Patient is a 82y old  Male who presents with a chief complaint of bright blood at rectal area (04 Sep 2017 01:34)      HPI:  82 year old male with history of CAD s/p stents (LAD, RCA bare metal around 9/16),PVD (RLE stent/ angioplasty and surgical debridment by Dr Siu 5/20,right lower extremity amputation ) A.Fib  on coumadin, Hypertension, COPD on home O2 2L, SHAYY onhistory of  nocturnal BIPAP, ex-smoker (smoked 1ppd X 50 years, quit 22 years ago),  Chronic diastolic CHF, Hyperlipidemia, PVD, Iron deficiency anemia, Chronic back pain, Gout, BPH, CKD III . 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  Per ED physician notes stool guaic positive ,on rectal exam done by ED physician there was some perianal skin tear and brown stools ,no gross blood in stools  INR in ED was 4   no active bleeding in ED ,no vitamin K given at this time  CXR no changes from 6/29/2017,no infiltrate or pleural effusion or pulmonary vascular congestion  patient is afebrile and asymptomatic in ED  reports chronic diarrhea 10 times a day for which he takes loperamide prn and lactobacillus      Patient with one loose bowel movement yesterday. He denies any abdominal pain except some mild upper abdominal burning.  Endoscopy with gastric AVMs as well as gastritis and significant gastric friability. The previous bleeding sites were stable with hemoclips. Additional hemoclips were placed during the last endoscopy as well.    Denies any nausea or vomiting.  Thinks that the stool is increasing in forme    Pmhx- see HPI  Pshx right rotator cuff repair, pilonydal cyst, RLE stent/angioplasty/surgical debridement,amputation,IVC filter  Family hx- rectal ca,HTN,DM  social hx- exsmoker quit 22 years ago,rare etoh use     ROS- patient denies any CP,SOB,abdominal pain,cough,fever,nausea,vomiting,dizziness,palpitations (03 Sep 2017 23:52)      PAST MEDICAL & SURGICAL HISTORY:  Diastolic CHF  Peripheral vascular disease  Afib  Anemia  CKD (chronic kidney disease)  COPD (chronic obstructive pulmonary disease)  SHAYY (obstructive sleep apnea)  Sepsis, due to unspecified organism: 2/2 poorly healing wounds b/l  Dyspepsia: On moderate exertion.  Sleep apnea, obstructive: Requires home 02 therapy, and treatment with BIPAP  Atelectasis  Pleural effusion, bilateral  Respiratory failure  Peripheral edema  CRI (chronic renal insufficiency)  Gout  Benign prostatic hypertrophy  Spinal stenosis  Hypercholesterolemia  GERD (gastroesophageal reflux disease)  CAD (coronary artery disease)  Hypertension  S/P angioplasty with stent  Cataract of left eye  Prostate: Surgery green light procedure.  S/P rotator cuff surgery: Right  S/P angioplasty      MEDICATIONS  (STANDING):  ferrous    sulfate 325 milliGRAM(s) Oral daily  metoprolol 25 milliGRAM(s) Oral two times a day  pramipexole 0.125 milliGRAM(s) Oral daily  lactobacillus acidophilus 1 Tablet(s) Oral two times a day with meals  allopurinol 100 milliGRAM(s) Oral daily  buDESOnide   0.5 milliGRAM(s) Respule 0.5 milliGRAM(s) Inhalation every 12 hours  doxazosin 8 milliGRAM(s) Oral at bedtime  isosorbide   mononitrate ER Tablet (IMDUR) 120 milliGRAM(s) Oral daily  gabapentin 300 milliGRAM(s) Oral daily  simvastatin 10 milliGRAM(s) Oral at bedtime  amLODIPine   Tablet 10 milliGRAM(s) Oral daily  vancomycin    Solution 125 milliGRAM(s) Oral every 6 hours  pantoprazole Infusion 8 mG/Hr (10 mL/Hr) IV Continuous <Continuous>  dextrose 5%. 1000 milliLiter(s) (50 mL/Hr) IV Continuous <Continuous>    MEDICATIONS  (PRN):  ALBUTerol/ipratropium for Nebulization 3 milliLiter(s) Nebulizer every 4 hours PRN Shortness of Breath and/or Wheezing  acetaminophen   Tablet 500 milliGRAM(s) Oral every 4 hours PRN For Temp greater than 38 C (100.4 F)  zinc oxide 40%/lanolin Ointment 1 Application(s) Topical every 3 hours PRN diarrhea episodes      Allergies    Zosyn (Rash)    Intolerances        SOCIAL HISTORY:NC    FAMILY HISTORY:  No pertinent family history in first degree relatives      REVIEW OF SYSTEMS:    CONSTITUTIONAL: No weakness, fevers or chills  EYES/ENT: No visual changes;  No vertigo or throat pain   NECK: No pain or stiffness  RESPIRATORY: No cough, wheezing, hemoptysis; No shortness of breath  CARDIOVASCULAR: No chest pain or palpitations  GENITOURINARY: No dysuria, frequency or hematuria  NEUROLOGICAL: No numbness or weakness  SKIN: No itching, burning, rashes, or lesions   All other review of systems is negative unless indicated above.    Vital Signs Last 24 Hrs  T(C): 36.6 (12 Sep 2017 05:45), Max: 36.7 (11 Sep 2017 17:00)  T(F): 97.9 (12 Sep 2017 05:45), Max: 98.1 (11 Sep 2017 17:00)  HR: 70 (12 Sep 2017 05:45) (60 - 70)  BP: 146/46 (12 Sep 2017 05:45) (127/46 - 150/37)  BP(mean): --  RR: 17 (12 Sep 2017 05:45) (17 - 24)  SpO2: 97% (12 Sep 2017 05:45) (97% - 100%)    PHYSICAL EXAM:    Constitutional: NAD, well-developed  HEENT: EOMI, throat clear  Neck: No LAD, supple  Respiratory: CTA and P  Cardiovascular: S1 and S2, RRR, no M  Gastrointestinal: BS+, soft, NT/ND, neg HSM,  Extremities: No peripheral edema, neg clubing, cyanosis    Neurological: A/O x 3, no focal deficits  Psychiatric: Normal mood, normal affect  Skin: No rashes    LABS:  CBC Full  -  ( 11 Sep 2017 05:58 )  WBC Count : 5.4 K/uL  Hemoglobin : 8.5 g/dL  Hematocrit : 26.3 %  Platelet Count - Automated : 206 K/uL  Mean Cell Volume : 93.4 fl  Mean Cell Hemoglobin : 30.1 pg  Mean Cell Hemoglobin Concentration : 32.2 gm/dL  Auto Neutrophil # : x  Auto Lymphocyte # : x  Auto Monocyte # : x  Auto Eosinophil # : x  Auto Basophil # : x  Auto Neutrophil % : x  Auto Lymphocyte % : x  Auto Monocyte % : x  Auto Eosinophil % : x  Auto Basophil % : x    09-11    144  |  111<H>  |  45<H>  ----------------------------<  92  3.3<L>   |  29  |  1.34<H>    Ca    8.0<L>      11 Sep 2017 05:58      PT/INR - ( 12 Sep 2017 07:14 )   PT: 12.1 sec;   INR: 1.12 ratio                 RADIOLOGY & ADDITIONAL STUDIES:  < from: US Duplex Venous Upper Ext Ltd, Left (09.11.17 @ 15:14) >  EXAM:  US DPLX UPR EXT VEINS LTD LT                            PROCEDURE DATE:  09/11/2017          INTERPRETATION:      Ultrasound of the left lower extremity deep venous system         CLINICAL INFORMATION:      Pain and swelling, deep venous thrombosis.    TECHNIQUE:   Doppler ultrasonography of the left lower extremity deep   venous system was performed.  The veins evaluated included the common   femoral vein, the inflow of the greater saphenous vein, the superficial   femoral vein,  the popliteal vein and the posterior tibial vein in the   proximal calf.      FINDINGS:    No previous examinations are available for review.    The left lower extremity deep venous system demonstrated nonocclusive   thrombus in the LEFT proximal basilic vein which is new as well as   thrombus within the medial cubital vein which is likely chronic. The   jugular, brachiocephalic, subclavian, axillary and brachial and cephalic   veins are patent.      IMPRESSION:   nonocclusive thrombus in the LEFT proximal basilic vein   which is new as well as thrombus within the medial cubital vein which is   likely chronic.     FINDINGS discussed with Dr. Cassidy at 3:50 PM on 9/11/2017    < end of copied text >

## 2017-09-12 NOTE — PROGRESS NOTE ADULT - SUBJECTIVE AND OBJECTIVE BOX
Patient is a 82y old  Male who presents with a chief complaint of bright blood at rectal area (04 Sep 2017 01:34)    HPI:  83 y/o male with h/o CAD s/p stents (LAD, RCA bare metal around 9/16), PVD (RLE stent/ angioplasty and surgical debridement 5/20, right lower extremity amputation), A.Fib  on coumadin, Hypertension, COPD on home O2 2L, SHAYY with BIPAP, chronic diastolic CHF, Hyperlipidemia, PVD, Iron deficiency anemia, chronic back pain, Gout, BPH, CKD III . 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 Patient has not had any gross blood in stools. In ED, no melena. He is reported with persistent diarrhea.     Loose stools improved  No pain  No fever or chills  No new complaints    MEDICATIONS  (STANDING):  ferrous    sulfate 325 milliGRAM(s) Oral daily  metoprolol 25 milliGRAM(s) Oral two times a day  pramipexole 0.125 milliGRAM(s) Oral daily  lactobacillus acidophilus 1 Tablet(s) Oral two times a day with meals  allopurinol 100 milliGRAM(s) Oral daily  buDESOnide   0.5 milliGRAM(s) Respule 0.5 milliGRAM(s) Inhalation every 12 hours  doxazosin 8 milliGRAM(s) Oral at bedtime  isosorbide   mononitrate ER Tablet (IMDUR) 120 milliGRAM(s) Oral daily  gabapentin 300 milliGRAM(s) Oral daily  simvastatin 10 milliGRAM(s) Oral at bedtime  amLODIPine   Tablet 10 milliGRAM(s) Oral daily  vancomycin    Solution 125 milliGRAM(s) Oral every 6 hours  dextrose 5%. 1000 milliLiter(s) (50 mL/Hr) IV Continuous <Continuous>  pantoprazole  Injectable 40 milliGRAM(s) IV Push every 12 hours    MEDICATIONS  (PRN):  ALBUTerol/ipratropium for Nebulization 3 milliLiter(s) Nebulizer every 4 hours PRN Shortness of Breath and/or Wheezing  acetaminophen   Tablet 500 milliGRAM(s) Oral every 4 hours PRN For Temp greater than 38 C (100.4 F)  zinc oxide 40%/lanolin Ointment 1 Application(s) Topical every 3 hours PRN diarrhea episodes      Vital Signs Last 24 Hrs  T(C): 36.4 (12 Sep 2017 11:29), Max: 36.7 (11 Sep 2017 17:00)  T(F): 97.6 (12 Sep 2017 11:29), Max: 98.1 (11 Sep 2017 17:00)  HR: 76 (12 Sep 2017 11:29) (53 - 76)  BP: 150/44 (12 Sep 2017 11:29) (142/52 - 150/44)  BP(mean): --  RR: 18 (12 Sep 2017 11:29) (17 - 24)  SpO2: 94% (12 Sep 2017 11:29) (94% - 98%)    Physical Exam:        Constitutional: frail looking  HEENT: NC/AT, EOMI, PERRLA  Neck: supple  Back: no tenderness  Respiratory: decreased BS at bases  Cardiovascular: S1S2 regular, no murmurs  Abdomen: soft, not tender, not distended, positive BS  Genitourinary: deferred  Rectal: deferred  Musculoskeletal: no muscle tenderness, no joint swelling or tenderness  Extremities: no pedal edema  Neurological: AxOx3, moving all extremities, no focal deficits  Skin: no rashes    Labs:                        7.9    5.6   )-----------( 214      ( 10 Sep 2017 07:14 )             24.5     09-10    144  |  113<H>  |  53<H>  ----------------------------<  115<H>  3.7   |  27  |  1.58<H>    Ca    7.8<L>      10 Sep 2017 07:18      Cultures:                         8.2    5.9   )-----------( 216      ( 07 Sep 2017 09:08 )             25.2     09-07    150<H>  |  118<H>  |  74<H>  ----------------------------<  99  4.1   |  24  |  1.33<H>    Ca    8.1<L>      07 Sep 2017 09:08                            6.5    x     )-----------( x        ( 06 Sep 2017 10:04 )             20.3     09-06    148<H>  |  116<H>  |  56<H>  ----------------------------<  97  4.6   |  24  |  1.52<H>    Ca    7.6<L>      06 Sep 2017 07:47  Mg     1.9     09-05      Radiology:    < from: Xray Chest 1 View AP/PA. (09.03.17 @ 18:47) >  Stable diffuse reticulonodular opacities likely consistent with pulmonary   vascular congestion. More prominent airspace opacity with air   bronchograms seen projecting over the left ventricular apex. Consider PA   and lateral views or CT of the chest. Follow to resolution.    < end of copied text >      Advanced directives addressed: full resuscitation

## 2017-09-12 NOTE — CONSULT NOTE ADULT - ASSESSMENT
82 year old male with history of CAD s/p stents (LAD, RCA bare metal around 9/16),PVD (RLE stent/ angioplasty and surgical debridment by Dr Siu 5/20,right lower extremity amputation ) A.Fib  on coumadin, Hypertension, COPD on home O2 2L, SHAYY onhistory of  nocturnal BIPAP, ex-smoker (smoked 1ppd X 50 years, quit 22 years ago),  Chronic diastolic CHF, Hyperlipidemia, PVD, Iron deficiency anemia, Chronic back pain, Gout, BPH, CKD III . 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    COPD with no significant bronchospasm  chronic hypoxemic resp failure on home o2 therapy  adequate oxygenation  CHF cont factor  SHAYY / OHS on home BIPAP  resp status improvedfurther significant wt loss reported  newly dxed LUE Thrombus    being evaluated for SVC filter placement  repeat cxr  if elevated bicarb procedures needed will get ABG, but currently does not change our management  incentive spirometry  bronchodilator rx  all rev tiffanieith family member at bedside

## 2017-09-12 NOTE — PROGRESS NOTE ADULT - ASSESSMENT
82 year old male with history of CAD s/p stents (LAD, RCA bare metal around 9/16),PVD (RLE stent/ angioplasty and surgical debridement by Dr Siu 5/20,right lower extremity amputation ) A.Fib  on coumadin, Hypertension, COPD on home O2 2L, SHAYY on history of  nocturnal BIPAP, ex-smoker (smoked 1ppd X 50 years, quit 22 years ago),  Chronic diastolic CHF, Hyperlipidemia, PVD, Iron deficiency anemia, Chronic back pain, Gout, BPH, CKD III . 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 Patient has been in rehab now for 5 months           # Rectal bleeding, source unclear,   suspected upper  GI bleed , also has perirectal superficial skin tear  additional GI bleeding secondary to upper GI source with gastric AVMs as well as friability of the stomach  With continue PPI.  Patient would be at increased risk of bleeding with anticoagulation.      Upper extremity DVT, consider SVC filter placement. Discussed with patient and family    C. difficile colitis that appears to be improving. Would continue vancomycin and consider discontinuing isolation for C. difficile.  Discussed with RN and primary care physician

## 2017-09-12 NOTE — PROGRESS NOTE ADULT - ASSESSMENT
82 year old male with history of CAD s/p stents (LAD, RCA bare metal around 9/16),PVD (RLE stent/ angioplasty and surgical debridement by Dr Siu 5/20,right lower extremity amputation ) A.Fib  on coumadin, Hypertension, COPD on home O2 2L, SHAYY on history of  nocturnal BIPAP, ex-smoker (smoked 1ppd X 50 years, quit 22 years ago),  Chronic diastolic CHF, Hyperlipidemia, PVD, Iron deficiency anemia, Chronic back pain, Gout, BPH, CKD III . 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 Patient has been in rehab now for 5 months           # Rectal bleeding, source unclear,   suspected upper  GI bleed , also has perirectal superficial skin tear  with supratherapiutic INR  - positive occult blood in ED, FOBT +    -s/p 2 units PRBC 9/7/17, 1 unit of PRBC 9/8/17  - Had melanotic stool 9/10  with drop in H/H- 1uPRBC  - H/H posttransfusion improved   - continue hold coumadin and Plavix,  - on   heparin sq prophylactic, off ASA for now   - PPI IV drip change to IVP BID   -S/p  push enteroscopy today   - D/w DR Hernández, 2 MVAs clipped, friable gastric mucosa, oozing on touch unlikely will tolerate A/c, SVC filter placement?   - Repeat UE Doppler ordered, d/w Radiologist, + old thrombus and new noted.   - Dr Wolff called ( Dr Clement  away) will evaluate Pt and review images.     # Acute on chronic diarrhea due to C diff colitis, resolved   - C/w PO vanco  - C diff +  - stool cx, o& p  - stop fenofibrate, hydralazine  - ID f/u appreciated       # Coagulapathy   - hold coumadin    - PT/INR daily      # ARYA on CKD stage 3  -S/p  IV hydration  -Cr improving   - renal f/u appreciated     # Hyperchloremic Hypernatremia likely due to dehydration   -off IVF  - monitor     # Hypokalemia, replace   -recheck in am     #hx of Afib/CAD /stents,   Peripheral arterial disease, DVT/IVC filter  - Hold a/c and antiPlts for now   - DR New toussaint pending     #  Chronic diastolic heart failure  stable  - euvolemic at present   - cardio consult input noted        #DVT prophylaxis - heparin sq    Dispo:   h/h monitoring, eval for possible IVC filter

## 2017-09-13 LAB
ANION GAP SERPL CALC-SCNC: 5 MMOL/L — SIGNIFICANT CHANGE UP (ref 5–17)
BUN SERPL-MCNC: 42 MG/DL — HIGH (ref 7–23)
CALCIUM SERPL-MCNC: 7.8 MG/DL — LOW (ref 8.5–10.1)
CHLORIDE SERPL-SCNC: 114 MMOL/L — HIGH (ref 96–108)
CO2 SERPL-SCNC: 28 MMOL/L — SIGNIFICANT CHANGE UP (ref 22–31)
CREAT SERPL-MCNC: 1.41 MG/DL — HIGH (ref 0.5–1.3)
GLUCOSE SERPL-MCNC: 106 MG/DL — HIGH (ref 70–99)
HCT VFR BLD CALC: 25.2 % — LOW (ref 39–50)
HGB BLD-MCNC: 8.7 G/DL — LOW (ref 13–17)
MCHC RBC-ENTMCNC: 32.3 PG — SIGNIFICANT CHANGE UP (ref 27–34)
MCHC RBC-ENTMCNC: 34.4 GM/DL — SIGNIFICANT CHANGE UP (ref 32–36)
MCV RBC AUTO: 93.9 FL — SIGNIFICANT CHANGE UP (ref 80–100)
PLATELET # BLD AUTO: 218 K/UL — SIGNIFICANT CHANGE UP (ref 150–400)
POTASSIUM SERPL-MCNC: 4.1 MMOL/L — SIGNIFICANT CHANGE UP (ref 3.5–5.3)
POTASSIUM SERPL-SCNC: 4.1 MMOL/L — SIGNIFICANT CHANGE UP (ref 3.5–5.3)
RBC # BLD: 2.68 M/UL — LOW (ref 4.2–5.8)
RBC # FLD: 15 % — HIGH (ref 10.3–14.5)
SODIUM SERPL-SCNC: 147 MMOL/L — HIGH (ref 135–145)
WBC # BLD: 6.3 K/UL — SIGNIFICANT CHANGE UP (ref 3.8–10.5)
WBC # FLD AUTO: 6.3 K/UL — SIGNIFICANT CHANGE UP (ref 3.8–10.5)

## 2017-09-13 RX ORDER — SOD,AMMONIUM,POTASSIUM LACTATE
1 CREAM (GRAM) TOPICAL
Qty: 0 | Refills: 0 | COMMUNITY

## 2017-09-13 RX ORDER — ASPIRIN/CALCIUM CARB/MAGNESIUM 324 MG
81 TABLET ORAL DAILY
Qty: 0 | Refills: 0 | Status: DISCONTINUED | OUTPATIENT
Start: 2017-09-13 | End: 2017-09-14

## 2017-09-13 RX ORDER — FUROSEMIDE 40 MG
40 TABLET ORAL ONCE
Qty: 0 | Refills: 0 | Status: COMPLETED | OUTPATIENT
Start: 2017-09-13 | End: 2017-09-13

## 2017-09-13 RX ORDER — ERYTHROPOIETIN 10000 [IU]/ML
1 INJECTION, SOLUTION INTRAVENOUS; SUBCUTANEOUS
Qty: 0 | Refills: 0 | COMMUNITY

## 2017-09-13 RX ADMIN — Medication 0.5 MILLIGRAM(S): at 19:36

## 2017-09-13 RX ADMIN — Medication 0.5 MILLIGRAM(S): at 08:22

## 2017-09-13 RX ADMIN — PRAMIPEXOLE DIHYDROCHLORIDE 0.12 MILLIGRAM(S): 0.12 TABLET ORAL at 12:18

## 2017-09-13 RX ADMIN — Medication 325 MILLIGRAM(S): at 12:18

## 2017-09-13 RX ADMIN — ISOSORBIDE MONONITRATE 120 MILLIGRAM(S): 60 TABLET, EXTENDED RELEASE ORAL at 12:18

## 2017-09-13 RX ADMIN — PANTOPRAZOLE SODIUM 40 MILLIGRAM(S): 20 TABLET, DELAYED RELEASE ORAL at 17:44

## 2017-09-13 RX ADMIN — Medication 125 MILLIGRAM(S): at 17:45

## 2017-09-13 RX ADMIN — Medication 125 MILLIGRAM(S): at 23:02

## 2017-09-13 RX ADMIN — Medication 125 MILLIGRAM(S): at 06:13

## 2017-09-13 RX ADMIN — Medication 25 MILLIGRAM(S): at 06:13

## 2017-09-13 RX ADMIN — GABAPENTIN 300 MILLIGRAM(S): 400 CAPSULE ORAL at 12:18

## 2017-09-13 RX ADMIN — SIMVASTATIN 10 MILLIGRAM(S): 20 TABLET, FILM COATED ORAL at 23:00

## 2017-09-13 RX ADMIN — Medication 3 MILLILITER(S): at 08:22

## 2017-09-13 RX ADMIN — Medication 1 TABLET(S): at 08:51

## 2017-09-13 RX ADMIN — Medication 25 MILLIGRAM(S): at 17:44

## 2017-09-13 RX ADMIN — Medication 125 MILLIGRAM(S): at 12:19

## 2017-09-13 RX ADMIN — AMLODIPINE BESYLATE 10 MILLIGRAM(S): 2.5 TABLET ORAL at 06:13

## 2017-09-13 RX ADMIN — Medication 40 MILLIGRAM(S): at 12:16

## 2017-09-13 RX ADMIN — Medication 8 MILLIGRAM(S): at 23:01

## 2017-09-13 RX ADMIN — Medication 100 MILLIGRAM(S): at 12:17

## 2017-09-13 RX ADMIN — Medication 81 MILLIGRAM(S): at 12:17

## 2017-09-13 RX ADMIN — Medication 1 TABLET(S): at 17:43

## 2017-09-13 RX ADMIN — PANTOPRAZOLE SODIUM 40 MILLIGRAM(S): 20 TABLET, DELAYED RELEASE ORAL at 06:13

## 2017-09-13 NOTE — PROGRESS NOTE ADULT - SUBJECTIVE AND OBJECTIVE BOX
NEPHROLOGY INTERVAL HPI/OVERNIGHT EVENTS:  diarrhea slowing down  will   HPI:  82 year old male with history of CAD s/p stents (LAD, RCA bare metal around ),PVD (RLE stent/ angioplasty and surgical debridment by Dr Siu ,right lower extremity amputation ) A.Fib  on coumadin, Hypertension, COPD on home O2 2L, SHAYY onhistory of  nocturnal BIPAP, ex-smoker (smoked 1ppd X 50 years, quit 22 years ago),  Chronic diastolic CHF, Hyperlipidemia, PVD, Iron deficiency anemia, Chronic back pain, Gout, BPH, CKD III . 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  Per ED physician notes stool guaic positive ,on rectal exam done by ED physician there was some perianal skin tear and brown stools ,no gross blood in stools  INR in ED was 4   no active bleeding in ED ,no vitamin K given at this time  CXR no changes from 2017,no infiltrate or pleural effusion or pulmonary vascular congestion  patient is afebrile and asymptomatic in ED  reports chronic diarrhea 10 times a day for which he takes loperamide prn and lactobacillus    Pmhx- see HPI  Pshx right rotator cuff repair, pilonydal cyst, RLE stent/angioplasty/surgical debridement,amputation,IVC filter  Family hx- rectal ca,HTN,DM  social hx- exsmoker quit 22 years ago,rare etoh use     ROS- patient denies any CP,SOB,abdominal pain,cough,fever,nausea,vomiting,dizziness,palpitations (03 Sep 2017 23:52)      Patient is a 82y old  Male who presents with a chief complaint of bright blood at rectal area (04 Sep 2017 01:34)      MEDICATIONS  (STANDING):  ferrous    sulfate 325 milliGRAM(s) Oral daily  metoprolol 25 milliGRAM(s) Oral two times a day  pramipexole 0.125 milliGRAM(s) Oral daily  lactobacillus acidophilus 1 Tablet(s) Oral two times a day with meals  allopurinol 100 milliGRAM(s) Oral daily  buDESOnide   0.5 milliGRAM(s) Respule 0.5 milliGRAM(s) Inhalation every 12 hours  doxazosin 8 milliGRAM(s) Oral at bedtime  isosorbide   mononitrate ER Tablet (IMDUR) 120 milliGRAM(s) Oral daily  gabapentin 300 milliGRAM(s) Oral daily  simvastatin 10 milliGRAM(s) Oral at bedtime  amLODIPine   Tablet 10 milliGRAM(s) Oral daily  vancomycin    Solution 125 milliGRAM(s) Oral every 6 hours  pantoprazole  Injectable 40 milliGRAM(s) IV Push every 12 hours  aspirin  chewable 81 milliGRAM(s) Oral daily  furosemide   Injectable 40 milliGRAM(s) IV Push once    MEDICATIONS  (PRN):  ALBUTerol/ipratropium for Nebulization 3 milliLiter(s) Nebulizer every 4 hours PRN Shortness of Breath and/or Wheezing  acetaminophen   Tablet 500 milliGRAM(s) Oral every 4 hours PRN For Temp greater than 38 C (100.4 F)  zinc oxide 40%/lanolin Ointment 1 Application(s) Topical every 3 hours PRN diarrhea episodes      Allergies    Zosyn (Rash)    Intolerances        I&O's Detail    12 Sep 2017 07:01  -  13 Sep 2017 07:00  --------------------------------------------------------  IN:    Oral Fluid: 840 mL  Total IN: 840 mL    OUT:    Voided: 500 mL  Total OUT: 500 mL    Total NET: 340 mL            Vital Signs Last 24 Hrs  T(C): 36.7 (13 Sep 2017 04:37), Max: 36.7 (12 Sep 2017 17:12)  T(F): 98 (13 Sep 2017 04:37), Max: 98.1 (12 Sep 2017 17:12)  HR: 78 (13 Sep 2017 08:20) (67 - 85)  BP: 156/55 (13 Sep 2017 04:37) (148/29 - 159/44)  BP(mean): --  RR: 17 (13 Sep 2017 04:37) (17 - 18)  SpO2: 98% (13 Sep 2017 04:37) (95% - 98%)  Daily     Daily Weight in k.4 (13 Sep 2017 04:37)    PHYSICAL EXAM:  General: alert. awake Ox3  HEENT: MMM  CV: s1s2 rrr  LUNGS: B/L CTA  EXT: no edema  + sacral edema    LABS:                        8.7    6.3   )-----------( 218      ( 13 Sep 2017 05:02 )             25.2     09-13    147<H>  |  114<H>  |  42<H>  ----------------------------<  106<H>  4.1   |  28  |  1.41<H>    Ca    7.8<L>      13 Sep 2017 05:02      PT/INR - ( 12 Sep 2017 07:14 )   PT: 12.1 sec;   INR: 1.12 ratio

## 2017-09-13 NOTE — PROGRESS NOTE ADULT - SUBJECTIVE AND OBJECTIVE BOX
Patient is a 82y old  Male who presents with a chief complaint of bright blood at rectal area (04 Sep 2017 01:34)    HPI:  83 y/o male with h/o CAD s/p stents (LAD, RCA bare metal around 9/16), PVD (RLE stent/ angioplasty and surgical debridement 5/20, right lower extremity amputation), A.Fib  on coumadin, Hypertension, COPD on home O2 2L, SHAYY with BIPAP, chronic diastolic CHF, Hyperlipidemia, PVD, Iron deficiency anemia, chronic back pain, Gout, BPH, CKD III . 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 Patient has not had any gross blood in stools. In ED, no melena. He is reported with persistent diarrhea.     Loose stools improved  No pain  No fever or chills  No new complaints    MEDICATIONS  (STANDING):  ferrous    sulfate 325 milliGRAM(s) Oral daily  metoprolol 25 milliGRAM(s) Oral two times a day  pramipexole 0.125 milliGRAM(s) Oral daily  lactobacillus acidophilus 1 Tablet(s) Oral two times a day with meals  allopurinol 100 milliGRAM(s) Oral daily  buDESOnide   0.5 milliGRAM(s) Respule 0.5 milliGRAM(s) Inhalation every 12 hours  doxazosin 8 milliGRAM(s) Oral at bedtime  isosorbide   mononitrate ER Tablet (IMDUR) 120 milliGRAM(s) Oral daily  gabapentin 300 milliGRAM(s) Oral daily  simvastatin 10 milliGRAM(s) Oral at bedtime  amLODIPine   Tablet 10 milliGRAM(s) Oral daily  vancomycin    Solution 125 milliGRAM(s) Oral every 6 hours  pantoprazole  Injectable 40 milliGRAM(s) IV Push every 12 hours  aspirin  chewable 81 milliGRAM(s) Oral daily    MEDICATIONS  (PRN):  ALBUTerol/ipratropium for Nebulization 3 milliLiter(s) Nebulizer every 4 hours PRN Shortness of Breath and/or Wheezing  acetaminophen   Tablet 500 milliGRAM(s) Oral every 4 hours PRN For Temp greater than 38 C (100.4 F)  zinc oxide 40%/lanolin Ointment 1 Application(s) Topical every 3 hours PRN diarrhea episodes      Vital Signs Last 24 Hrs  T(C): 36.6 (13 Sep 2017 11:49), Max: 36.7 (12 Sep 2017 17:12)  T(F): 97.9 (13 Sep 2017 11:49), Max: 98.1 (12 Sep 2017 17:12)  HR: 72 (13 Sep 2017 11:49) (67 - 85)  BP: 148/46 (13 Sep 2017 11:49) (148/29 - 159/44)  BP(mean): --  RR: 16 (13 Sep 2017 11:49) (16 - 18)  SpO2: 95% (13 Sep 2017 11:49) (95% - 98%)    Physical Exam:        Constitutional: frail looking  HEENT: NC/AT, EOMI, PERRLA  Neck: supple  Back: no tenderness  Respiratory: decreased BS at bases  Cardiovascular: S1S2 regular, no murmurs  Abdomen: soft, not tender, not distended, positive BS  Genitourinary: deferred  Rectal: deferred  Musculoskeletal: no muscle tenderness, no joint swelling or tenderness  Extremities: no pedal edema  Neurological: AxOx3, moving all extremities, no focal deficits  Skin: no rashes    Labs:                        7.9    5.6   )-----------( 214      ( 10 Sep 2017 07:14 )             24.5     09-10    144  |  113<H>  |  53<H>  ----------------------------<  115<H>  3.7   |  27  |  1.58<H>    Ca    7.8<L>      10 Sep 2017 07:18      Cultures:                         8.2    5.9   )-----------( 216      ( 07 Sep 2017 09:08 )             25.2     09-07    150<H>  |  118<H>  |  74<H>  ----------------------------<  99  4.1   |  24  |  1.33<H>    Ca    8.1<L>      07 Sep 2017 09:08                            6.5    x     )-----------( x        ( 06 Sep 2017 10:04 )             20.3     09-06    148<H>  |  116<H>  |  56<H>  ----------------------------<  97  4.6   |  24  |  1.52<H>    Ca    7.6<L>      06 Sep 2017 07:47  Mg     1.9     09-05      Radiology:    < from: Xray Chest 1 View AP/PA. (09.03.17 @ 18:47) >  Stable diffuse reticulonodular opacities likely consistent with pulmonary   vascular congestion. More prominent airspace opacity with air   bronchograms seen projecting over the left ventricular apex. Consider PA   and lateral views or CT of the chest. Follow to resolution.    < end of copied text >      Advanced directives addressed: full resuscitation

## 2017-09-13 NOTE — PROGRESS NOTE ADULT - ASSESSMENT
82 year old male with history of CAD s/p stents (LAD, RCA bare metal around 9/16),PVD (RLE stent/ angioplasty and surgical debridement by Dr Siu 5/20,right lower extremity amputation ) A.Fib  on coumadin, Hypertension, COPD on home O2 2L, SHAYY on history of  nocturnal BIPAP, ex-smoker (smoked 1ppd X 50 years, quit 22 years ago),  Chronic diastolic CHF, Hyperlipidemia, PVD, Iron deficiency anemia, Chronic back pain, Gout, BPH, CKD III . 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 Patient has been in rehab now for 5 months           #  acute on Chronic blood loss anemia 2/2 Rectal bleeding, source unclear,   suspected upper  GI bleed , also has perirectal superficial skin tear  with supratherapiutic INR  - positive occult blood in ED, FOBT +    -s/p 2 units PRBC 9/7/17, 1 unit of PRBC 9/8/17  - Had melanotic stool 9/10  with drop in H/H- 1uPRBC  - H/H posttransfusion improved   - continue hold coumadin and Plavix,  - on   heparin sq prophylactic, off ASA for now   -S/p  push enteroscopy today   - D/w DR Hernández, 2 MVAs clipped, friable gastric mucosa, oozing on touch unlikely will tolerate A/c, SVC filter placement?   - Repeat UE Doppler ordered, d/w Radiologist, + old thrombus and new noted.   -  case discussed and imaging reviewed with Dr Wolff, Pt s/p permanent IVC filter placement, concern regarding complications with SCV filter placement and low risk of PE with UE DVTs. will evaluate Pt     # Acute on chronic diarrhea due to C diff colitis, resolved   - C/w PO vanco  - C diff +  - stool cx, o& p  - stop fenofibrate, hydralazine  - ID f/u appreciated       # Coagulapathy, resolved   -stop  coumadin          # ARYA on CKD stage 3  -off  IVF  -Cr stable   - renal f/u appreciated, lasix low dose given for sacral edema     #  Hypernatremia  acute on chronic   - Improved and acceptable   - s/p  D5W  - Renal f/u appreciated     # Hypokalemia, replaced      #hx of Afib/CAD /stents,   Peripheral arterial disease, DVT s/p IVC filter  - Hold a/c, restart antiPlts, monitor h/h   - DR New toussaint pending     #  Chronic diastolic heart failure  stable  - euvolemic at present   - cardio consult input noted        #DVT prophylaxis - heparin sq    Dispo:  await for Dr New toussaint, D/c planning to [facility] after that

## 2017-09-13 NOTE — PROGRESS NOTE ADULT - SUBJECTIVE AND OBJECTIVE BOX
Subjective:  Patient is a 82y old  Male who presents with a chief complaint of bright blood at rectal area    HPI:         82 year old male with history of CAD s/p stents (LAD, RCA bare metal around 9/16),PVD (RLE stent/ angioplasty and surgical debridement by Dr Siu 5/20,right lower extremity amputation ) A.Fib  on coumadin, Hypertension, COPD on home O2 2L, SHAYY on history of  nocturnal BIPAP, ex-smoker (smoked 1ppd X 50 years, quit 22 years ago),  Chronic diastolic CHF, Hyperlipidemia, PVD, Iron deficiency anemia, Chronic back pain, Gout, BPH, CKD III . 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 Patient has been in rehab now for 5 months   In ED - no gross blood in stools, no cookie ,  ED physician notes stool guaic positive ,on rectal exam done by ED physician there was some perianal skin tear and brown stools , INR  was 4 , reports chronic diarrhea 10 times a day for which he takes loperamide prn and lactobacillus    9/9/17: Chart reviewed, Pt was seen and examined reports feeling fine today, no diarrhea, no episodes of bleeding, tolerates diet. Pt reports no SOB or CP. States that agrees on procedure and understands risk   9/10/17: Pt was seen and examined, reports episode of dark stool overnight, H/H lower today, started on PRBCs. Has no other complains. POC discussed  9/11/17: Pt was seen and examined, S/p push enteroscopy results discussed also risk of bleeding with coagulation and possible PE with recent DVT discussed. Pt wants to be evaluated for SVC filer. No complains, tolerated diet after procedure.   9/13/17: Pt was seen and examined, no complains feeling well. POC discussed.  No fevers, no chills. Brief conversation with DR Wolff discussed, awaiting for eval     Review of system- Rest of the review of system are negative except mentioned in HPI    OBJECTIVE:   Vital Signs Last 24 Hrs  T(C): 36.6 (13 Sep 2017 11:49), Max: 36.7 (13 Sep 2017 04:37)  T(F): 97.9 (13 Sep 2017 11:49), Max: 98 (13 Sep 2017 04:37)  HR: 76 (13 Sep 2017 14:02) (72 - 85)  BP: 160/49 (13 Sep 2017 14:02) (148/46 - 175/45)  RR: 16 (13 Sep 2017 11:49) (16 - 18)  SpO2: 95% (13 Sep 2017 11:49) (95% - 98%)        PHYSICAL EXAM:  GENERAL: NAD  NERVOUS SYSTEM:  Alert & Oriented X3, non- focal exam  HEAD:  Atraumatic, Normocephalic  EYES: EOMI, PERRLA, conjunctiva and sclera clear  HEENT: Moist mucous membranes  NECK: Supple, No JVD  CHEST/LUNG: Clear to auscultation bilaterally; No rales, no rhonchi, no wheezing, or rubs  HEART: Regular rate and rhythm; No murmurs, rubs, or gallops  ABDOMEN: Soft, Nontender, mildly distended; Bowel sounds present  GENITOURINARY- Voiding, no suprapubic tenderness  EXTREMITIES:  - chronic LLE edema 1+, RLE amputation  MUSCULOSKELETAL: No muscle tenderness, Muscle tone normal, No joint tenderness, no Joint swelling, Joint range of motion-normal  SKIN-no rash, no lesion    LABS:                        8.7    6.3   )-----------( 218      ( 13 Sep 2017 05:02 )             25.2     09-13    147<H>  |  114<H>  |  42<H>  ----------------------------<  106<H>  4.1   |  28  |  1.41<H>    Ca    7.8<L>      13 Sep 2017 05:02  PT/INR - ( 12 Sep 2017 07:14 )   PT: 12.1 sec;   INR: 1.12 ratio          RECENT CULTURES:  Clostridium difficile Toxin by PCR (09.05.17 @ 12:30)    C Diff by PCR Result: Detected      Culture - Urine (09.03.17 @ 20:28)    Specimen Source: .Urine None    Culture Results:   No growth      RADIOLOGY & ADDITIONAL TESTS:      < from: Xray Chest 1 View AP/PA. (09.03.17 @ 18:47) >  Stable diffuse reticulonodular opacities likely consistent with pulmonary   vascular congestion. More prominent airspace opacity with air   bronchograms seen projecting over the left ventricular apex. Consider PA   and lateral views or CT of the chest. Follow to resolution.      EXAM:  US DPLX UPR EXT VEINS LTD LT                        PROCEDURE DATE:  09/11/2017  The left lower extremity deep venous system demonstrated nonocclusive   thrombus in the LEFT proximal basilic vein which is new as well as   thrombus within the medial cubital vein which is likely chronic. The   jugular, brachiocephalic, subclavian, axillary and brachial and cephalic   veins are patent.  IMPRESSION:   nonocclusive thrombus in the LEFT proximal basilic vein   which is new as well as thrombus within the medial cubital vein which is   likely chronic.         MEDICATIONS  (STANDING):  ferrous    sulfate 325 milliGRAM(s) Oral daily  metoprolol 25 milliGRAM(s) Oral two times a day  pramipexole 0.125 milliGRAM(s) Oral daily  lactobacillus acidophilus 1 Tablet(s) Oral two times a day with meals  allopurinol 100 milliGRAM(s) Oral daily  buDESOnide   0.5 milliGRAM(s) Respule 0.5 milliGRAM(s) Inhalation every 12 hours  doxazosin 8 milliGRAM(s) Oral at bedtime  isosorbide   mononitrate ER Tablet (IMDUR) 120 milliGRAM(s) Oral daily  gabapentin 300 milliGRAM(s) Oral daily  simvastatin 10 milliGRAM(s) Oral at bedtime  amLODIPine   Tablet 10 milliGRAM(s) Oral daily  vancomycin    Solution 125 milliGRAM(s) Oral every 6 hours  pantoprazole  Injectable 40 milliGRAM(s) IV Push every 12 hours  aspirin  chewable 81 milliGRAM(s) Oral daily    MEDICATIONS  (PRN):  ALBUTerol/ipratropium for Nebulization 3 milliLiter(s) Nebulizer every 4 hours PRN Shortness of Breath and/or Wheezing  acetaminophen   Tablet 500 milliGRAM(s) Oral every 4 hours PRN For Temp greater than 38 C (100.4 F)  zinc oxide 40%/lanolin Ointment 1 Application(s) Topical every 3 hours PRN diarrhea episodes

## 2017-09-13 NOTE — PROGRESS NOTE ADULT - ASSESSMENT
83 y/o male with h/o CAD s/p stents (LAD, RCA bare metal around 9/16), PVD (RLE stent/ angioplasty and surgical debridement 5/20, right lower extremity amputation), A.Fib  on coumadin, Hypertension, COPD on home O2 2L, SHAYY with BIPAP, chronic diastolic CHF, Hyperlipidemia, PVD, Iron deficiency anemia, chronic back pain, Gout, BPH, CKD III . 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 Patient has not had any gross blood in stools. In ED, no melena. He is reported with persistent diarrhea.     1. Diarrheal syndrome resolved. CDAD. Lower GI bleeding resolving. Anemia, likely iron deficiency anemia.  -anemia is improved s/p PRBC  -improving  -contact isolation  -on vancomycin 125 mg PO q6h # 8  -tolerating abx well so far; no side effects noted  -continue abx coverage  -monitor temps  -GI evaluation appreciated  -f/u CBC  -supportive care  2. Other issues:   -care per medicine

## 2017-09-13 NOTE — PROGRESS NOTE ADULT - ASSESSMENT
82 year old male with history of CAD s/p stents (LAD, RCA bare metal around 9/16),PVD (RLE stent/ angioplasty and surgical debridement by Dr Siu 5/20,right lower extremity amputation ) A.Fib  on coumadin, Hypertension, COPD on home O2 2L, SHAYY on history of  nocturnal BIPAP, ex-smoker (smoked 1ppd X 50 years, quit 22 years ago),  Chronic diastolic CHF, Hyperlipidemia, PVD, Iron deficiency anemia, Chronic back pain, Gout, BPH, CKD III . 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 Patient has been in rehab now for 5 months           # Rectal bleeding, source unclear,   suspected upper  GI bleed , also has perirectal superficial skin tear  additional GI bleeding secondary to upper GI source with gastric AVMs as well as friability of the stomach  With continue PPI.  Patient would be at increased risk of bleeding with anticoagulation.      Upper extremity DVT, consider SVC filter placement. Discussed with patient and family, await Dr Ivon Cassidy    C. difficile colitis that appears to be improving. Would continue vancomycin and consider discontinuing isolation for C. difficile.  Discussed with RN and primary care physician

## 2017-09-13 NOTE — PROGRESS NOTE ADULT - ASSESSMENT
81 y/o with hx of CKD stage 3/4 with recent new baseline scr of 1.6-1.7 presents with BRBPR with hx of GI bleed.  Now with worsening anemia and being evaluated for Acute blood loss anemia.  Pt has been receiving epogen and po Iron as outpt at [facility]    PLAN  - dc IVF, pt at his baseline renal function and is usually with mild hypernatremia  - hold lasix for now and will moniter when need to start this  - transfusion as per hospitalist/ GI  - will hold off on epogen for now, get iron studies, ferritin, retic etc.  Pt has a hx of chronically low hgb (8-9) recently and was attributed to GI loss        9/7  daughter at bedside   feels well  + C dif on po vanco   daughter questioning the Lasix use  got 1 u prbc last night for hgb 6.9  d/w Pts daughter, labs pending    9/8 MK  - CKD stage 3 stable with chronic mild hypernatremia, encourage po intake  - GIB; for transfusion today.  and will give lasix iv x1   family updated at bedside.    -    9/9 MK  - Ckd stage 3 at baseline with mild hyperntremia: po intake  - GIB bleed: h/h stable today and prn lasix    9/10 MK  - Stable CKD stage 3 with baseline hypernatremia: stable   - GIB bleed s/p transfusion today, for push enterscopy tomorrow  lasix iv today  - cdad: improving on po vanc    9/11  - Stable ckd and stable and improved hypernatremia: po intkae  - GIB s/p push enterscopy  - CDAD: improving      9/12 SY  --CKD   Stable renal parameters.  Continue to monitor .   Pt does have chronic mild Hypernatremia due to continued need for diuretics.  --GIB  post EGD noted with AVM and gastritis. --Hemoclips placed.  Continue to follow HGB  --CDAD  continue po vanco.    9/13 MK  - CKD stable with variable hypernatremia chronic and acceptable.  will plan for lasix x1 due to sacral edema  - GIB; GI input noted. for Dr New toussaint for the ivc filter  - CDAD continue PO vanc and improving

## 2017-09-13 NOTE — PROGRESS NOTE ADULT - SUBJECTIVE AND OBJECTIVE BOX
Patient is a 82y old  Male who presents with a chief complaint of bright blood at rectal area (04 Sep 2017 01:34)      HPI:  82 year old male with history of CAD s/p stents (LAD, RCA bare metal around 9/16),PVD (RLE stent/ angioplasty and surgical debridment by Dr Siu 5/20,right lower extremity amputation ) A.Fib  on coumadin, Hypertension, COPD on home O2 2L, SHAYY onhistory of  nocturnal BIPAP, ex-smoker (smoked 1ppd X 50 years, quit 22 years ago),  Chronic diastolic CHF, Hyperlipidemia, PVD, Iron deficiency anemia, Chronic back pain, Gout, BPH, CKD III . 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  Per ED physician notes stool guaic positive ,on rectal exam done by ED physician there was some perianal skin tear and brown stools ,no gross blood in stools  INR in ED was 4   no active bleeding in ED ,no vitamin K given at this time  CXR no changes from 6/29/2017,no infiltrate or pleural effusion or pulmonary vascular congestion  patient is afebrile and asymptomatic in ED  reports chronic diarrhea 10 times a day for which he takes loperamide prn and lactobacillus      9/13   pt comf and vida diet   neg N V  neg abd pain and BM dec to 1 a day  neg blood  per pt and daughter    Pmhx- see HPI  Pshx right rotator cuff repair, pilonydal cyst, RLE stent/angioplasty/surgical debridement,amputation,IVC filter  Family hx- rectal ca,HTN,DM  social hx- exsmoker quit 22 years ago,rare etoh use     ROS- patient denies any CP,SOB,abdominal pain,cough,fever,nausea,vomiting,dizziness,palpitations (03 Sep 2017 23:52)      PAST MEDICAL & SURGICAL HISTORY:  Diastolic CHF  Peripheral vascular disease  Afib  Anemia  CKD (chronic kidney disease)  COPD (chronic obstructive pulmonary disease)  SHAYY (obstructive sleep apnea)  Sepsis, due to unspecified organism: 2/2 poorly healing wounds b/l  Dyspepsia: On moderate exertion.  Sleep apnea, obstructive: Requires home 02 therapy, and treatment with BIPAP  Atelectasis  Pleural effusion, bilateral  Respiratory failure  Peripheral edema  CRI (chronic renal insufficiency)  Gout  Benign prostatic hypertrophy  Spinal stenosis  Hypercholesterolemia  GERD (gastroesophageal reflux disease)  CAD (coronary artery disease)  Hypertension  S/P angioplasty with stent  Cataract of left eye  Prostate: Surgery green light procedure.  S/P rotator cuff surgery: Right  S/P angioplasty      MEDICATIONS  (STANDING):  ferrous    sulfate 325 milliGRAM(s) Oral daily  metoprolol 25 milliGRAM(s) Oral two times a day  pramipexole 0.125 milliGRAM(s) Oral daily  lactobacillus acidophilus 1 Tablet(s) Oral two times a day with meals  allopurinol 100 milliGRAM(s) Oral daily  buDESOnide   0.5 milliGRAM(s) Respule 0.5 milliGRAM(s) Inhalation every 12 hours  doxazosin 8 milliGRAM(s) Oral at bedtime  isosorbide   mononitrate ER Tablet (IMDUR) 120 milliGRAM(s) Oral daily  gabapentin 300 milliGRAM(s) Oral daily  simvastatin 10 milliGRAM(s) Oral at bedtime  amLODIPine   Tablet 10 milliGRAM(s) Oral daily  vancomycin    Solution 125 milliGRAM(s) Oral every 6 hours  pantoprazole  Injectable 40 milliGRAM(s) IV Push every 12 hours    MEDICATIONS  (PRN):  ALBUTerol/ipratropium for Nebulization 3 milliLiter(s) Nebulizer every 4 hours PRN Shortness of Breath and/or Wheezing  acetaminophen   Tablet 500 milliGRAM(s) Oral every 4 hours PRN For Temp greater than 38 C (100.4 F)  zinc oxide 40%/lanolin Ointment 1 Application(s) Topical every 3 hours PRN diarrhea episodes      Allergies    Zosyn (Rash)    Intolerances        SOCIAL HISTORY:    FAMILY HISTORY:  No pertinent family history in first degree relatives      REVIEW OF SYSTEMS:    CONSTITUTIONAL: No weakness, fevers or chills  EYES/ENT: No visual changes;  No vertigo or throat pain   NECK: No pain or stiffness  RESPIRATORY: No cough, wheezing, hemoptysis; No shortness of breath  CARDIOVASCULAR: No chest pain or palpitations  GENITOURINARY: No dysuria, frequency or hematuria  NEUROLOGICAL: No numbness or weakness  SKIN: No itching, burning, rashes, or lesions   All other review of systems is negative unless indicated above.    Vital Signs Last 24 Hrs  T(C): 36.7 (13 Sep 2017 04:37), Max: 36.7 (12 Sep 2017 17:12)  T(F): 98 (13 Sep 2017 04:37), Max: 98.1 (12 Sep 2017 17:12)  HR: 73 (13 Sep 2017 04:37) (53 - 85)  BP: 156/55 (13 Sep 2017 04:37) (148/29 - 159/44)  BP(mean): --  RR: 17 (13 Sep 2017 04:37) (17 - 18)  SpO2: 98% (13 Sep 2017 04:37) (94% - 98%)    PHYSICAL EXAM:    Constitutional: NAD, well-developed  HEENT: EOMI, throat clear  Neck: No LAD, supple  Respiratory: CTA and P  Cardiovascular: S1 and S2, RRR, no M  Gastrointestinal: BS+, soft, NT/ND, neg HSM,  Extremities: No peripheral edema, neg clubing, cyanosis    Neurological: A/O x 3, no focal deficits  Psychiatric: Normal mood, normal affect  Skin: No rashes    LABS:  CBC Full  -  ( 13 Sep 2017 05:02 )  WBC Count : 6.3 K/uL  Hemoglobin : 8.7 g/dL  Hematocrit : 25.2 %  Platelet Count - Automated : 218 K/uL  Mean Cell Volume : 93.9 fl  Mean Cell Hemoglobin : 32.3 pg  Mean Cell Hemoglobin Concentration : 34.4 gm/dL  Auto Neutrophil # : x  Auto Lymphocyte # : x  Auto Monocyte # : x  Auto Eosinophil # : x  Auto Basophil # : x  Auto Neutrophil % : x  Auto Lymphocyte % : x  Auto Monocyte % : x  Auto Eosinophil % : x  Auto Basophil % : x    09-13    147<H>  |  114<H>  |  42<H>  ----------------------------<  106<H>  4.1   |  28  |  1.41<H>    Ca    7.8<L>      13 Sep 2017 05:02      PT/INR - ( 12 Sep 2017 07:14 )   PT: 12.1 sec;   INR: 1.12 ratio                 RADIOLOGY & ADDITIONAL STUDIES:

## 2017-09-13 NOTE — PROGRESS NOTE ADULT - SUBJECTIVE AND OBJECTIVE BOX
Patient is a 82y old  Male who presents with a chief complaint of bright blood at rectal area (04 Sep 2017 01:34)      HPI:  82 year old male with history of CAD s/p stents (LAD, RCA bare metal around 9/16),PVD (RLE stent/ angioplasty and surgical debridment by Dr Siu 5/20,right lower extremity amputation ) A.Fib  on coumadin, Hypertension, COPD on home O2 2L, SHAYY onhistory of  nocturnal BIPAP, ex-smoker (smoked 1ppd X 50 years, quit 22 years ago),  Chronic diastolic CHF, Hyperlipidemia, PVD, Iron deficiency anemia, Chronic back pain, Gout, BPH, CKD III . 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  Per ED physician notes stool guaic positive ,on rectal exam done by ED physician there was some perianal skin tear and brown stools ,no gross blood in stools  INR in ED was 4   no active bleeding in ED ,no vitamin K given at this time  CXR no changes from 6/29/2017,no infiltrate or pleural effusion or pulmonary vascular congestion  patient is afebrile and asymptomatic in ED  reports chronic diarrhea 10 times a day for which he takes loperamide prn and lactobacillus  9/12  pt is seen and examined with his dtr at bedside  no distress  no active cough  9/13  dtr at bedside  not using BIPAP anymore  feels better  being evaluated SVC filter  Pmhx- see HPI  Pshx right rotator cuff repair, pilonydal cyst, RLE stent/angioplasty/surgical debridement,amputation,IVC filter  Family hx- rectal ca,HTN,DM  social hx- exsmoker quit 22 years ago,rare etoh use     ROS- patient denies any CP,SOB,abdominal pain,cough,fever,nausea,vomiting,dizziness,palpitations (03 Sep 2017 23:52)      PAST MEDICAL & SURGICAL HISTORY:  Diastolic CHF  Peripheral vascular disease  Afib  Anemia  CKD (chronic kidney disease)  COPD (chronic obstructive pulmonary disease)  SHAYY (obstructive sleep apnea)  Sepsis, due to unspecified organism: 2/2 poorly healing wounds b/l  Dyspepsia: On moderate exertion.  Sleep apnea, obstructive: Requires home 02 therapy, and treatment with BIPAP  Atelectasis  Pleural effusion, bilateral  Respiratory failure  Peripheral edema  CRI (chronic renal insufficiency)  Gout  Benign prostatic hypertrophy  Spinal stenosis  Hypercholesterolemia  GERD (gastroesophageal reflux disease)  CAD (coronary artery disease)  Hypertension  S/P angioplasty with stent  Cataract of left eye  Prostate: Surgery green light procedure.  S/P rotator cuff surgery: Right  S/P angioplasty      PREVIOUS DIAGNOSTIC TESTING:      MEDICATIONS  (STANDING):  ferrous    sulfate 325 milliGRAM(s) Oral daily  metoprolol 25 milliGRAM(s) Oral two times a day  pramipexole 0.125 milliGRAM(s) Oral daily  lactobacillus acidophilus 1 Tablet(s) Oral two times a day with meals  allopurinol 100 milliGRAM(s) Oral daily  buDESOnide   0.5 milliGRAM(s) Respule 0.5 milliGRAM(s) Inhalation every 12 hours  doxazosin 8 milliGRAM(s) Oral at bedtime  isosorbide   mononitrate ER Tablet (IMDUR) 120 milliGRAM(s) Oral daily  gabapentin 300 milliGRAM(s) Oral daily  simvastatin 10 milliGRAM(s) Oral at bedtime  amLODIPine   Tablet 10 milliGRAM(s) Oral daily  vancomycin    Solution 125 milliGRAM(s) Oral every 6 hours  pantoprazole Infusion 8 mG/Hr (10 mL/Hr) IV Continuous <Continuous>  dextrose 5%. 1000 milliLiter(s) (50 mL/Hr) IV Continuous <Continuous>    MEDICATIONS  (PRN):  ALBUTerol/ipratropium for Nebulization 3 milliLiter(s) Nebulizer every 4 hours PRN Shortness of Breath and/or Wheezing  acetaminophen   Tablet 500 milliGRAM(s) Oral every 4 hours PRN For Temp greater than 38 C (100.4 F)  zinc oxide 40%/lanolin Ointment 1 Application(s) Topical every 3 hours PRN diarrhea episodes      FAMILY HISTORY:  No pertinent family history in first degree relatives        REVIEW OF SYSTEM:  Pertinent items are noted in HPI.  Constitutional negative for chills, fevers, sweats and weight loss  throat, and face:  negative for epistaxis, nasal congestion, sore throat and   tinnitus  Respiratory: negative for cough,pos dyspnea on exertion,  no pleuritic chest pain  and wheezing  Cardiovascular:  negative for chest pain, dyspnea and palpitations  Gastrointestinal: negative for abdominal pain, diarrhea, nausea and vomiting  Genitourinary: negative for dysuria, frequency and urinary incontinence  Skin:  negative for redness, rash, pruritus, swelling, dryness and   fissures  Hematologic/lymphatic: negative for bleeding and easy bruising  Musculoskeletal: pos for arthralgias, back pain and muscle weakness      Vital Signs Last 24 Hrs  T(C): 36.7 (13 Sep 2017 04:37), Max: 36.7 (12 Sep 2017 17:12)  T(F): 98 (13 Sep 2017 04:37), Max: 98.1 (12 Sep 2017 17:12)  HR: 73 (13 Sep 2017 04:37) (53 - 85)  BP: 156/55 (13 Sep 2017 04:37) (148/29 - 159/44)  BP(mean): --  RR: 17 (13 Sep 2017 04:37) (17 - 18)  SpO2: 98% (13 Sep 2017 04:37) (94% - 98%)    I&O's Summary    11 Sep 2017 07:01  -  12 Sep 2017 07:00  --------------------------------------------------------  IN: 240 mL / OUT: 575 mL / NET: -335 mL      PHYSICAL EXAM  General Appearance: cooperative, no acute distress,   HEENT: PERRL, conjunctiva clear, EOM's intact, non injected pharynx, no exudate, TM   normal  Neck: Supple, , no adenopathy, thyroid: not enlarged, no carotid bruit or JVD  Back: Symmetric, no  tenderness,no soft tissue tenderness  Lungs: Clear to auscultation bilateral,no adventitious breath sounds, normal   expiratory phase  Heart: Regular rate and rhythm, S1, S2 normal, no murmur, rub or gallop  Abdomen: Soft, non-tender, bowel sounds active , no hepatosplenomegaly  Extremities: hx right AKA      ECG:    LABS:                          9.2    6.1   )-----------( 212      ( 12 Sep 2017 07:14 )             27.5     09-12    146<H>  |  112<H>  |  42<H>  ----------------------------<  116<H>  4.0   |  27  |  1.48<H>    Ca    7.8<L>      12 Sep 2017 07:14              PT/INR - ( 12 Sep 2017 07:14 )   PT: 12.1 sec;   INR: 1.12 ratio                   RADIOLOGY & ADDITIONAL STUDIES:  < from: Xray Chest 1 View AP/PA. (09.03.17 @ 18:47) >  Impression:    Stable diffuse reticulonodular opacities likely consistent with pulmonary   vascular congestion. More prominent airspace opacity with air   bronchograms seen projecting over the left ventricular apex. Consider PA   and lateral views or CT of the chest. Follow to resolution.    < end of copied text >

## 2017-09-14 ENCOUNTER — TRANSCRIPTION ENCOUNTER (OUTPATIENT)
Age: 82
End: 2017-09-14

## 2017-09-14 VITALS
TEMPERATURE: 98 F | DIASTOLIC BLOOD PRESSURE: 34 MMHG | OXYGEN SATURATION: 98 % | RESPIRATION RATE: 16 BRPM | SYSTOLIC BLOOD PRESSURE: 157 MMHG | HEART RATE: 66 BPM

## 2017-09-14 LAB
ANION GAP SERPL CALC-SCNC: 6 MMOL/L — SIGNIFICANT CHANGE UP (ref 5–17)
BUN SERPL-MCNC: 40 MG/DL — HIGH (ref 7–23)
CALCIUM SERPL-MCNC: 7.8 MG/DL — LOW (ref 8.5–10.1)
CHLORIDE SERPL-SCNC: 113 MMOL/L — HIGH (ref 96–108)
CO2 SERPL-SCNC: 30 MMOL/L — SIGNIFICANT CHANGE UP (ref 22–31)
CREAT SERPL-MCNC: 1.48 MG/DL — HIGH (ref 0.5–1.3)
GLUCOSE SERPL-MCNC: 108 MG/DL — HIGH (ref 70–99)
HCT VFR BLD CALC: 26.7 % — LOW (ref 39–50)
HGB BLD-MCNC: 8.8 G/DL — LOW (ref 13–17)
MCHC RBC-ENTMCNC: 31.5 PG — SIGNIFICANT CHANGE UP (ref 27–34)
MCHC RBC-ENTMCNC: 33 GM/DL — SIGNIFICANT CHANGE UP (ref 32–36)
MCV RBC AUTO: 95.4 FL — SIGNIFICANT CHANGE UP (ref 80–100)
PLATELET # BLD AUTO: 213 K/UL — SIGNIFICANT CHANGE UP (ref 150–400)
POTASSIUM SERPL-MCNC: 3.6 MMOL/L — SIGNIFICANT CHANGE UP (ref 3.5–5.3)
POTASSIUM SERPL-SCNC: 3.6 MMOL/L — SIGNIFICANT CHANGE UP (ref 3.5–5.3)
RBC # BLD: 2.8 M/UL — LOW (ref 4.2–5.8)
RBC # FLD: 15.3 % — HIGH (ref 10.3–14.5)
SODIUM SERPL-SCNC: 149 MMOL/L — HIGH (ref 135–145)
WBC # BLD: 7.1 K/UL — SIGNIFICANT CHANGE UP (ref 3.8–10.5)
WBC # FLD AUTO: 7.1 K/UL — SIGNIFICANT CHANGE UP (ref 3.8–10.5)

## 2017-09-14 RX ORDER — ERYTHROPOIETIN 10000 [IU]/ML
1 INJECTION, SOLUTION INTRAVENOUS; SUBCUTANEOUS
Qty: 0 | Refills: 0 | COMMUNITY

## 2017-09-14 RX ORDER — ZINC OXIDE 200 MG/G
0 OINTMENT TOPICAL
Qty: 0 | Refills: 0 | COMMUNITY
Start: 2017-09-14

## 2017-09-14 RX ORDER — VANCOMYCIN HCL 1 G
1 VIAL (EA) INTRAVENOUS
Qty: 0 | Refills: 0 | COMMUNITY
Start: 2017-09-14

## 2017-09-14 RX ADMIN — Medication 100 MILLIGRAM(S): at 11:46

## 2017-09-14 RX ADMIN — ISOSORBIDE MONONITRATE 120 MILLIGRAM(S): 60 TABLET, EXTENDED RELEASE ORAL at 11:47

## 2017-09-14 RX ADMIN — Medication 25 MILLIGRAM(S): at 06:21

## 2017-09-14 RX ADMIN — PRAMIPEXOLE DIHYDROCHLORIDE 0.12 MILLIGRAM(S): 0.12 TABLET ORAL at 11:47

## 2017-09-14 RX ADMIN — Medication 125 MILLIGRAM(S): at 06:20

## 2017-09-14 RX ADMIN — PANTOPRAZOLE SODIUM 40 MILLIGRAM(S): 20 TABLET, DELAYED RELEASE ORAL at 06:20

## 2017-09-14 RX ADMIN — Medication 125 MILLIGRAM(S): at 11:47

## 2017-09-14 RX ADMIN — Medication 1 TABLET(S): at 09:42

## 2017-09-14 RX ADMIN — Medication 0.5 MILLIGRAM(S): at 08:15

## 2017-09-14 RX ADMIN — AMLODIPINE BESYLATE 10 MILLIGRAM(S): 2.5 TABLET ORAL at 06:21

## 2017-09-14 RX ADMIN — Medication 81 MILLIGRAM(S): at 11:46

## 2017-09-14 RX ADMIN — GABAPENTIN 300 MILLIGRAM(S): 400 CAPSULE ORAL at 11:47

## 2017-09-14 RX ADMIN — Medication 325 MILLIGRAM(S): at 11:46

## 2017-09-14 NOTE — DISCHARGE NOTE ADULT - MEDICATION SUMMARY - MEDICATIONS TO TAKE
I will START or STAY ON the medications listed below when I get home from the hospital:    budesonide 0.5 mg/2 mL inhalation suspension  -- 2 milliliter(s) inhaled 2 times a day  -- Indication: For COPD (chronic obstructive pulmonary disease)    Aspirin Enteric Coated 81 mg oral delayed release tablet  -- 1 tab(s) by mouth once a day  -- Indication: For CAD    HYDROmorphone 2 mg oral tablet  -- 1 tab(s) by mouth 3 times a day, As needed, Moderate Pain (4 - 6)  -- Indication: For Pain    acetaminophen 325 mg oral tablet  -- 2 tab(s) by mouth every 8 hours, As Needed -for temp>100.5 or body aches/headaches  -- Indication: For PAin    Milk of Magnesia 8% oral suspension  -- 30 milliliter(s) by mouth once a day, As Needed for constipation  -- Indication: For COnstipation    doxazosin 8 mg oral tablet  -- 1 tab(s) by mouth once a day (at bedtime)  -- Indication: For BPH    isosorbide mononitrate 120 mg oral tablet, extended release  -- 1 tab(s) by mouth once a day  -- Indication: For CAD    gabapentin 300 mg oral capsule  -- 1 cap(s) by mouth once a day  -- Indication: For neuropathic pain    allopurinol 100 mg oral tablet  -- 1 tab(s) by mouth once a day  -- Indication: For Gout    simvastatin 10 mg oral tablet  -- 1 tab(s) by mouth once a day (at bedtime)  -- Indication: For HLD    pramipexole 0.125 mg oral tablet  -- 1 tab(s) by mouth once a day  -- Indication: For Tremor    metoprolol tartrate 25 mg oral tablet  -- 1 tab(s) by mouth 2 times a day  -- Indication: For HTN    albuterol 90 mcg/inh inhalation aerosol  -- 2 puff(s) inhaled every 6 hours, As needed, Shortness of Breath  -- Indication: For COPD (chronic obstructive pulmonary disease)    DuoNeb 0.5 mg-2.5 mg/3 mL inhalation solution  -- 3 milliliter(s) inhaled 4 times a day, As Needed for shortness of breath  -- Indication: For COPD (chronic obstructive pulmonary disease)    amLODIPine 5 mg oral tablet  -- 1 tab(s) by mouth once a day  -- Indication: For HTN    ammonium lactate 12% topical lotion  -- Apply on skin to affected area 2 times a day  -- Indication: For Dry skin    zinc oxide topical  -- Indication: For PPxs     furosemide 40 mg oral tablet  -- 1 tab(s) by mouth once a day  -- Indication: For CHF    vancomycin 125 mg oral capsule  -- 1 cap(s) by mouth every 6 hours last day  9/19/17  -- Indication: For C.Diff    ferrous sulfate 325 mg (65 mg elemental iron) oral delayed release tablet  -- 1 tab(s) by mouth once a day  -- Indication: For Anemia    potassium chloride 20 mEq oral granule, extended release  -- 1 tab(s) by mouth once a day  -- Indication: For supplement    Chondroitin-Glucosamine 200 mg-250 mg oral capsule  -- 1 cap(s) by mouth 2 times a day  -- Indication: For supplement    lactobacillus acidophilus oral capsule  -- 1 tab(s) by mouth 2 times a day  -- Indication: For supplement    pantoprazole 40 mg oral delayed release tablet  -- 1 tab(s) by mouth 2 times a day  -- Indication: For GASTRitis    hydrALAZINE 50 mg oral tablet  -- 1 tab(s) by mouth once a day  -- Indication: For HTN    Therapeutic Multiple Vitamins oral tablet  -- 1 tab(s) by mouth once a day  -- Indication: For supplement    Vitamin D3 2000 intl units oral capsule  -- 1 cap(s) by mouth once a day  -- Indication: For supplement

## 2017-09-14 NOTE — PROGRESS NOTE ADULT - ASSESSMENT
82 year old male with history of CAD s/p stents (LAD, RCA bare metal around 9/16),PVD (RLE stent/ angioplasty and surgical debridment by Dr Siu 5/20,right lower extremity amputation ) A.Fib  on coumadin, Hypertension, COPD on home O2 2L, SHAYY onhistory of  nocturnal BIPAP, ex-smoker (smoked 1ppd X 50 years, quit 22 years ago),  Chronic diastolic CHF, Hyperlipidemia, PVD, Iron deficiency anemia, Chronic back pain, Gout, BPH, CKD III . 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,sent from NH for minimal red blood stain on diaper.   GI bleed- H/H stable. Unable to continue with long term anticoagulation  DVT on basilic, recanalised with no swelling  SVC filter placement will be technically riskier outweighting the benefits of the PE prevention from a PICC line induced clot in L basilic  Ok to discharge with no anticoagulation  Risks and benefits were discussed with pt and family  Will follow as outpatient if L arm becomes swollen and symptomatic.

## 2017-09-14 NOTE — DISCHARGE NOTE ADULT - PLAN OF CARE
resolve No further Coumadin, Plavix on hold, continue  Aspirin low dose. F/u with Dr Hernández compensate low sodium diet, c/w lasix, f/u with cardio monitor Watch for L arm swelling, f/u with Cardiovascular Sx complete oral VAnco last day 9/19

## 2017-09-14 NOTE — DISCHARGE NOTE ADULT - SECONDARY DIAGNOSIS.
Mother states child has a slight fever and throat pain. Mother states child has had symptoms since yesterday. Diastolic CHF DVT (deep venous thrombosis) Clostridium difficile diarrhea

## 2017-09-14 NOTE — DISCHARGE NOTE ADULT - OTHER SIGNIFICANT FINDINGS
RECENT CULTURES:  Clostridium difficile Toxin by PCR (09.05.17 @ 12:30)    C Diff by PCR Result: Detected      Culture - Urine (09.03.17 @ 20:28)    Specimen Source: .Urine None    Culture Results:   No growth      RADIOLOGY & ADDITIONAL TESTS:      < from: Xray Chest 1 View AP/PA. (09.03.17 @ 18:47) >  Stable diffuse reticulonodular opacities likely consistent with pulmonary   vascular congestion. More prominent airspace opacity with air   bronchograms seen projecting over the left ventricular apex. Consider PA   and lateral views or CT of the chest. Follow to resolution.      EXAM:  US DPLX UPR EXT VEINS LTD LT                        PROCEDURE DATE:  09/11/2017  The left lower extremity deep venous system demonstrated nonocclusive   thrombus in the LEFT proximal basilic vein which is new as well as   thrombus within the medial cubital vein which is likely chronic. The   jugular, brachiocephalic, subclavian, axillary and brachial and cephalic   veins are patent.  IMPRESSION:   nonocclusive thrombus in the LEFT proximal basilic vein   which is new as well as thrombus within the medial cubital vein which is   likely chronic.

## 2017-09-14 NOTE — DISCHARGE NOTE ADULT - CARE PLAN
Principal Discharge DX:	GIB (gastrointestinal bleeding)  Goal:	resolve  Instructions for follow-up, activity and diet:	No further Coumadin, Plavix on hold, continue  Aspirin low dose. F/u with Dr Hernández  Secondary Diagnosis:	Diastolic CHF  Goal:	compensate  Instructions for follow-up, activity and diet:	low sodium diet, c/w lasix, f/u with cardio  Secondary Diagnosis:	DVT (deep venous thrombosis)  Goal:	monitor  Instructions for follow-up, activity and diet:	Watch for L arm swelling, f/u with Cardiovascular Sx  Secondary Diagnosis:	Clostridium difficile diarrhea  Goal:	resolve  Instructions for follow-up, activity and diet:	complete oral VAnco last day 9/19

## 2017-09-14 NOTE — DISCHARGE NOTE ADULT - CARE PROVIDER_API CALL
Devante Hernández), Gastroenterology  [address] [place] [address]  Phone: [phone]  Fax: [phone]    Alexx Lance (), Cardiology; Internal Medicine  [address] [place] [address]  Phone: [phone]  Fax: [phone]    Jason Clement), Vascular Surgery  [address] [facility]  [place] [address]  Phone: [phone]  Fax: [phone]    Tree Hannah), Internal Medicine  [address] [place] [address]  Phone: [phone]  Fax: [phone]

## 2017-09-14 NOTE — DISCHARGE NOTE ADULT - PATIENT PORTAL LINK FT
“You can access the [facility] Patient Portal, offered by [facility], by registering with the following website: [email or web address]”

## 2017-09-14 NOTE — PROGRESS NOTE ADULT - ASSESSMENT
82 year old male with history of CAD s/p stents (LAD, RCA bare metal around 9/16),PVD (RLE stent/ angioplasty and surgical debridement by Dr Siu 5/20,right lower extremity amputation ) A.Fib  on coumadin, Hypertension, COPD on home O2 2L, SHAYY on history of  nocturnal BIPAP, ex-smoker (smoked 1ppd X 50 years, quit 22 years ago),  Chronic diastolic CHF, Hyperlipidemia, PVD, Iron deficiency anemia, Chronic back pain, Gout, BPH, CKD III . 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 Patient has been in rehab now for 5 months           # Rectal bleeding, source unclear,   suspected upper  GI bleed , also has perirectal superficial skin tear  additional GI bleeding secondary to upper GI source with gastric AVMs as well as friability of the stomach  With continue PPI.  Patient would be at increased risk of bleeding with anticoagulation.      Upper extremity DVT, consider SVC filter placement. Discussed with patient and family, card note appreciated  DW Dr Cassidy    C. difficile colitis that appears to be improving. Would continue vancomycin and consider discontinuing isolation for C. difficile.  Discussed with RN and primary care physician

## 2017-09-14 NOTE — PROGRESS NOTE ADULT - ASSESSMENT
82 year old male with history of CAD s/p stents (LAD, RCA bare metal around 9/16),PVD (RLE stent/ angioplasty and surgical debridment by Dr Siu 5/20,right lower extremity amputation ) A.Fib  on coumadin, Hypertension, COPD on home O2 2L, SHAYY onhistory of  nocturnal BIPAP, ex-smoker (smoked 1ppd X 50 years, quit 22 years ago),  Chronic diastolic CHF, Hyperlipidemia, PVD, Iron deficiency anemia, Chronic back pain, Gout, BPH, CKD III . 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    COPD with no significant bronchospasm  chronic hypoxemic resp failure on home o2 therapy  adequate oxygenation  CHF cont factor  SHAYY / OHS on home BIPAP  resp status improvedfurther significant wt loss reported  newly dxed LUE Thrombus    being evaluated for SVC filter placement, will not receive one  incentive spirometry  bronchodilator rx  Spoke with daughter and patient extensively today, all questions answered.

## 2017-09-14 NOTE — DISCHARGE NOTE ADULT - ADDITIONAL INSTRUCTIONS
f/u with PCP after EMMANUEL,  CArdio in 2 weeks, Renal in 2 weeks and DR dugan in 2-4 weeks, DR Hernández in 2 weeks

## 2017-09-14 NOTE — PROGRESS NOTE ADULT - SUBJECTIVE AND OBJECTIVE BOX
Patient is a 82y old  Male who presents with a chief complaint of bright blood at rectal area (04 Sep 2017 01:34)      HPI:  82 year old male with history of CAD s/p stents (LAD, RCA bare metal around 9/16),PVD (RLE stent/ angioplasty and surgical debridment by Dr Siu 5/20,right lower extremity amputation ) A.Fib  on coumadin, Hypertension, COPD on home O2 2L, SHAYY onhistory of  nocturnal BIPAP, ex-smoker (smoked 1ppd X 50 years, quit 22 years ago),  Chronic diastolic CHF, Hyperlipidemia, PVD, Iron deficiency anemia, Chronic back pain, Gout, BPH, CKD III . 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  Per ED physician notes stool guaic positive ,on rectal exam done by ED physician there was some perianal skin tear and brown stools ,no gross blood in stools  INR in ED was 4   no active bleeding in ED ,no vitamin K given at this time  CXR no changes from 6/29/2017,no infiltrate or pleural effusion or pulmonary vascular congestion  patient is afebrile and asymptomatic in ED  reports chronic diarrhea 10 times a day for which he takes loperamide prn and lactobacillus  9/12  pt is seen and examined with his dtr at bedside  no distress  no active cough  9/13  dtr at bedside  not using BIPAP anymore  feels better  being evaluated SVC filter    9/14  Patient to be discharged in the near future  States that he has no new complaints  Will not receive Anticoagulation or a filter    Pmhx- see HPI  Pshx right rotator cuff repair, pilonydal cyst, RLE stent/angioplasty/surgical debridement,amputation,IVC filter  Family hx- rectal ca,HTN,DM  social hx- exsmoker quit 22 years ago,rare etoh use     ROS- patient denies any CP,SOB,abdominal pain,cough,fever,nausea,vomiting,dizziness,palpitations (03 Sep 2017 23:52)      PAST MEDICAL & SURGICAL HISTORY:  Diastolic CHF  Peripheral vascular disease  Afib  Anemia  CKD (chronic kidney disease)  COPD (chronic obstructive pulmonary disease)  SHAYY (obstructive sleep apnea)  Sepsis, due to unspecified organism: 2/2 poorly healing wounds b/l  Dyspepsia: On moderate exertion.  Sleep apnea, obstructive: Requires home 02 therapy, and treatment with BIPAP  Atelectasis  Pleural effusion, bilateral  Respiratory failure  Peripheral edema  CRI (chronic renal insufficiency)  Gout  Benign prostatic hypertrophy  Spinal stenosis  Hypercholesterolemia  GERD (gastroesophageal reflux disease)  CAD (coronary artery disease)  Hypertension  S/P angioplasty with stent  Cataract of left eye  Prostate: Surgery green light procedure.  S/P rotator cuff surgery: Right  S/P angioplasty      PREVIOUS DIAGNOSTIC TESTING:      MEDICATIONS  (STANDING):  ferrous    sulfate 325 milliGRAM(s) Oral daily  metoprolol 25 milliGRAM(s) Oral two times a day  pramipexole 0.125 milliGRAM(s) Oral daily  lactobacillus acidophilus 1 Tablet(s) Oral two times a day with meals  allopurinol 100 milliGRAM(s) Oral daily  buDESOnide   0.5 milliGRAM(s) Respule 0.5 milliGRAM(s) Inhalation every 12 hours  doxazosin 8 milliGRAM(s) Oral at bedtime  isosorbide   mononitrate ER Tablet (IMDUR) 120 milliGRAM(s) Oral daily  gabapentin 300 milliGRAM(s) Oral daily  simvastatin 10 milliGRAM(s) Oral at bedtime  amLODIPine   Tablet 10 milliGRAM(s) Oral daily  vancomycin    Solution 125 milliGRAM(s) Oral every 6 hours  pantoprazole Infusion 8 mG/Hr (10 mL/Hr) IV Continuous <Continuous>  dextrose 5%. 1000 milliLiter(s) (50 mL/Hr) IV Continuous <Continuous>    MEDICATIONS  (PRN):  ALBUTerol/ipratropium for Nebulization 3 milliLiter(s) Nebulizer every 4 hours PRN Shortness of Breath and/or Wheezing  acetaminophen   Tablet 500 milliGRAM(s) Oral every 4 hours PRN For Temp greater than 38 C (100.4 F)  zinc oxide 40%/lanolin Ointment 1 Application(s) Topical every 3 hours PRN diarrhea episodes      FAMILY HISTORY:  No pertinent family history in first degree relatives        REVIEW OF SYSTEM:  Pertinent items are noted in HPI.  Constitutional negative for chills, fevers, sweats and weight loss  throat, and face:  negative for epistaxis, nasal congestion, sore throat and   tinnitus  Respiratory: negative for cough,pos dyspnea on exertion,  no pleuritic chest pain  and wheezing  Cardiovascular:  negative for chest pain, dyspnea and palpitations  Gastrointestinal: negative for abdominal pain, diarrhea, nausea and vomiting  Genitourinary: negative for dysuria, frequency and urinary incontinence  Skin:  negative for redness, rash, pruritus, swelling, dryness and   fissures  Hematologic/lymphatic: negative for bleeding and easy bruising  Musculoskeletal: pos for arthralgias, back pain and muscle weakness      Vital Signs Last 24 Hrs  T(C): 36.7 (13 Sep 2017 04:37), Max: 36.7 (12 Sep 2017 17:12)  T(F): 98 (13 Sep 2017 04:37), Max: 98.1 (12 Sep 2017 17:12)  HR: 73 (13 Sep 2017 04:37) (53 - 85)  BP: 156/55 (13 Sep 2017 04:37) (148/29 - 159/44)  BP(mean): --  RR: 17 (13 Sep 2017 04:37) (17 - 18)  SpO2: 98% (13 Sep 2017 04:37) (94% - 98%)    I&O's Summary    11 Sep 2017 07:01  -  12 Sep 2017 07:00  --------------------------------------------------------  IN: 240 mL / OUT: 575 mL / NET: -335 mL      PHYSICAL EXAM  General Appearance: cooperative, no acute distress,   HEENT: PERRL, conjunctiva clear, EOM's intact, non injected pharynx, no exudate, TM   normal  Neck: Supple, , no adenopathy, thyroid: not enlarged, no carotid bruit or JVD  Back: Symmetric, no  tenderness,no soft tissue tenderness  Lungs: Clear to auscultation bilateral,no adventitious breath sounds, normal   expiratory phase  Heart: Regular rate and rhythm, S1, S2 normal, no murmur, rub or gallop  Abdomen: Soft, non-tender, bowel sounds active , no hepatosplenomegaly  Extremities: hx right AKA      ECG:    LABS:                            8.8    7.1   )-----------( 213      ( 14 Sep 2017 05:38 )             26.7   09-14    149<H>  |  113<H>  |  40<H>  ----------------------------<  108<H>  3.6   |  30  |  1.48<H>    Ca    7.8<L>      14 Sep 2017 05:38                            9.2    6.1   )-----------( 212      ( 12 Sep 2017 07:14 )             27.5     09-12    146<H>  |  112<H>  |  42<H>  ----------------------------<  116<H>  4.0   |  27  |  1.48<H>    Ca    7.8<L>      12 Sep 2017 07:14              PT/INR - ( 12 Sep 2017 07:14 )   PT: 12.1 sec;   INR: 1.12 ratio                   RADIOLOGY & ADDITIONAL STUDIES:  < from: Xray Chest 1 View AP/PA. (09.03.17 @ 18:47) >  Impression:    Stable diffuse reticulonodular opacities likely consistent with pulmonary   vascular congestion. More prominent airspace opacity with air   bronchograms seen projecting over the left ventricular apex. Consider PA   and lateral views or CT of the chest. Follow to resolution.    < end of copied text >

## 2017-09-14 NOTE — DISCHARGE NOTE ADULT - INSTRUCTIONS
Pt being transferred to Rehab. 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 Pt has R AKA with stump sleeve in place. return to ER or call MD if you experience fever greater than 101, any sings of bleeding, shortness of breath or chest pain. Continue medications as prescribed and follow up with doctors as instructed.

## 2017-09-14 NOTE — PROGRESS NOTE ADULT - PROVIDER SPECIALTY LIST ADULT
Cardiology
Electrophysiology
Gastroenterology
Hospitalist
Infectious Disease
Nephrology
Pulmonology
Pulmonology
Gastroenterology
Cardiology

## 2017-09-14 NOTE — DISCHARGE NOTE ADULT - HOSPITAL COURSE
82 year old male with history of CAD s/p stents (LAD, RCA bare metal around 9/16),PVD (RLE stent/ angioplasty and surgical debridement by Dr Siu 5/20,right lower extremity amputation ) A.Fib  on coumadin, Hypertension, COPD on home O2 2L, SHAYY on history of  nocturnal BIPAP, ex-smoker (smoked 1ppd X 50 years, quit 22 years ago),  Chronic diastolic CHF, Hyperlipidemia, PVD, Iron deficiency anemia, Chronic back pain, Gout, BPH, CKD III . 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 Patient has been in rehab now for 5 months   In ED - no gross blood in stools, no cookie ,  ED physician notes stool guaic positive ,on rectal exam done by ED physician there was some perianal skin tear and brown stools , INR  was 4 , reports chronic diarrhea 10 times a day for which he takes loperamide prn and lactobacillus  PHYSICAL EXAM:  GENERAL: NAD  NERVOUS SYSTEM:  Alert & Oriented X3, non- focal exam  HEAD:  Atraumatic, Normocephalic  EYES: EOMI, PERRLA, conjunctiva and sclera clear  HEENT: Moist mucous membranes  NECK: Supple, No JVD  CHEST/LUNG: Clear to auscultation bilaterally; No rales, no rhonchi, no wheezing, or rubs  HEART: Regular rate and rhythm; No murmurs, rubs, or gallops  ABDOMEN: Soft, Nontender, mildly distended; Bowel sounds present  GENITOURINARY- Voiding, no suprapubic tenderness  EXTREMITIES:  - chronic LLE edema 1+, RLE amputation  MUSCULOSKELETAL: No muscle tenderness, Muscle tone normal, No joint tenderness, no Joint swelling, Joint range of motion-normal  SKIN-no rash, no lesion    #  acute on Chronic blood loss anemia 2/2 Rectal bleeding, source unclear,   suspected upper  GI bleed , also has perirectal superficial skin tear  with supratherapiutic INR  - positive occult blood in ED, FOBT +    -s/p 2 units PRBC 9/7/17, 1 unit of PRBC 9/8/17  - Had melanotic stool 9/10  with drop in H/H- 1uPRBC  - H/H posttransfusion improved   - continue hold coumadin and Plavix,  - on   heparin sq prophylactic, off ASA for now   -S/p  push enteroscopy   - D/w DR Hernández, 2 MVAs clipped, friable gastric mucosa, oozing on touch unlikely will tolerate A/c, SVC filter placement?   - Repeat UE Doppler ordered, d/w Radiologist, + old thrombus and new noted.   -  case discussed and imaging reviewed with Dr Wolff, Pt s/p permanent IVC filter placement, concern regarding complications with SCV filter placement and low risk of PE with UE DVTs. will evaluate Pt     # Acute on chronic diarrhea due to C diff colitis, resolved   - C/w PO vanco  - C diff +  - stool cx, o& p  - off  fenofibrate, hydralazine  - ID f/u appreciated       # Coagulapathy, resolved   -stop  coumadin          # ARYA on CKD stage 3  -off  IVF  -Cr stable   - renal f/u appreciated, lasix low dose given for sacral edema     #  Hypernatremia  acute on chronic   - Improved and acceptable   - s/p  D5W  - Renal f/u appreciated     # Hypokalemia, replaced      #hx of Afib/CAD /stents,   Peripheral arterial disease, DVT s/p IVC filter  - Hold a/c, restart antiPlts, monitor h/h   - DR New albarranal pending     #  Chronic diastolic heart failure  stable  - euvolemic at present   - cardio consult input noted 82 year old male with history of CAD s/p stents (LAD, RCA bare metal around 9/16),PVD (RLE stent/ angioplasty and surgical debridement by Dr Siu 5/20,right lower extremity amputation ) A.Fib  on coumadin, Hypertension, COPD on home O2 2L, SHAYY on history of  nocturnal BIPAP, ex-smoker (smoked 1ppd X 50 years, quit 22 years ago),  Chronic diastolic CHF, Hyperlipidemia, PVD, Iron deficiency anemia, Chronic back pain, Gout, BPH, CKD III . 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 Patient has been in rehab now for 5 months  In ED - no gross blood in stools, no melena ,  guaiac positive ,on rectal exam done by ED physician there was some perianal skin tear and brown stools , INR  was 4.  Pt had multiple blood transfusions during the stay, bleeding resolved.  Pt had push enteroscopy which showed bleeding AVms, clipped. DR Hernández recommended against A/c due to high risk of bleeding. Also Pt has H/o LUE and RLE dvt and has IVC filter, was evaluated by DR Wolff for possible SVC filter placement, and risk of procedure complication outweigh benefit as UE DVT  less risk for PE. Decision  was made against procedure. Pt restart on low dose ASA. No bleeding. Hospital course  notable for C.Diff diarrhea Pt started on PO Vanco and diarrhea resolved. Today Pt reports feeling well, had 2 BPs soft, no bleeding. Denies, fevers or chills,  breathing at  baseline. D/c planning discussed     Vital Signs Last 24 Hrs  T(C): 36.6 (14 Sep 2017 11:07), Max: 36.8 (13 Sep 2017 21:24)  T(F): 97.9 (14 Sep 2017 11:07), Max: 98.3 (13 Sep 2017 21:24)  HR: 66 (14 Sep 2017 11:07) (66 - 80)  BP: 157/34 (14 Sep 2017 11:07) (137/60 - 175/45)  RR: 16 (14 Sep 2017 11:07) (16 - 17)  SpO2: 98% (14 Sep 2017 11:07) (94% - 98%)  PHYSICAL EXAM:  GENERAL: NAD  NERVOUS SYSTEM:  Alert & Oriented X3, non- focal exam  HEAD:  Atraumatic, Normocephalic  EYES: EOMI, PERRLA, conjunctiva and sclera clear  HEENT: Moist mucous membranes  NECK: Supple, No JVD  CHEST/LUNG: Clear to auscultation bilaterally; No rales, no rhonchi, no wheezing, or rubs  HEART: Regular rate and rhythm; No murmurs, rubs, or gallops  ABDOMEN: Soft, Nontender, mildly distended; Bowel sounds present  GENITOURINARY- Voiding, no suprapubic tenderness  EXTREMITIES:  - chronic LLE edema 1+, RLE amputation  MUSCULOSKELETAL: No muscle tenderness, Muscle tone normal, No joint tenderness, no Joint swelling, Joint range of motion-normal  SKIN-no rash, no lesion    #  acute on Chronic blood loss anemia 2/2 Rectal bleeding, source unclear,   suspected upper  GI bleed , also has perirectal superficial skin tear, with supratherapiutic INR  - positive occult blood in ED, FOBT +    -s/p 2 units PRBC 9/7/17, 1 unit of PRBC 9/8/17  - Had melanotic stool 9/10  with drop in H/H- 1uPRBC  - H/H  improved and stable now   - restarted on ASA,  Plavix still on hold   -S/p  push enteroscopy   - D/w DR Hernández, 2 MVAs clipped, friable gastric mucosa, oozing on touch unlikely will tolerate A/c, SVC filter placement?   - Repeat UE Doppler ordered, d/w Radiologist, + old thrombus and new noted.   -  case discussed and imaging reviewed with Dr Wolff, Pt s/p permanent IVC filter placement, concern regarding complications with SCV filter placement and low risk of PE with UE DVTs. Recommended to monitor for LUE edema and repeat Doppler in 1 month  - F/u  outPt with DR Clement     # Acute on chronic diarrhea due to C diff colitis, resolved   - C/w PO vanco day 9  - C diff +  - off  fenofibrate, hydralazine  - D/w Dr Burleson, will complete 14 days course       # Coagulapathy, resolved   -off  coumadin        # ARYA on CKD stage 3  -off  IVF  -Cr stable   - D/w DR Sanabria, will restart PO lasix 40mg PO QD    #  Hypernatremia  acute on chronic   - level acceptable   - s/p  D5W  - D/w  Renal     # Hypokalemia, replace  -  monitor at [facility]      #hx of Afib/CAD /stents,   Peripheral arterial disease, DVT s/p IVC filter  - Off a/c, restarted on baby ASA, palvix still on hold   -  F/u with cardio or CV to discuss when to start Plavix     #  Chronic diastolic heart failure  stable  - euvolemic at present   - restart daily lasix   - Monitor lab and weight  closely     # COPD on home O2  - c/w O2  - C/w bronchodilators and inhales steroids     Dispo: Stable for d/c to [facility] today.

## 2017-09-14 NOTE — PROGRESS NOTE ADULT - SUBJECTIVE AND OBJECTIVE BOX
Cardiology Consultation    HPI: 82 year old male with history of CAD s/p stents (LAD, RCA bare metal around ),PVD (RLE stent/ angioplasty and surgical debridment by Dr Siu ,right lower extremity amputation ) A.Fib  on coumadin, Hypertension, COPD on home O2 2L, SHAYY onhistory of  nocturnal BIPAP, ex-smoker (smoked 1ppd X 50 years, quit 22 years ago),  Chronic diastolic CHF, Hyperlipidemia, PVD, Iron deficiency anemia, Chronic back pain, Gout, BPH, CKD III . 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 Per ED physician notes stool guaic positive ,on rectal exam done by ED physician there was some perianal skin tear and brown stools ,no gross blood in stools. INR in ED was 4. No active bleeding in ED ,no vitamin K given at this time. CXR no changes from 2017,no infiltrate or pleural effusion or pulmonary vascular congestion. patient is afebrile and asymptomatic in ED reports chronic diarrhea 10 times a day for which he takes loperamide prn and lactobacillus      - No CP/SOB. No fevers. Not on telemetry. No bleeding last pm   Asymptomatic  DVT on L basilic, post PICC line, unable to take long term anticoagulation, no swelling on left arm, no pain    Pmhx- see HPI  Pshx right rotator cuff repair, pilonydal cyst, RLE stent/angioplasty/surgical debridement,amputation,IVC filter  Family hx- rectal ca,HTN,DM  social hx- exsmoker quit 22 years ago,rare etoh use     PAST MEDICAL & SURGICAL HISTORY:  Diastolic CHF  Peripheral vascular disease  Afib  Anemia  CKD (chronic kidney disease)  COPD (chronic obstructive pulmonary disease)  SHAYY (obstructive sleep apnea)  Sepsis, due to unspecified organism: 2/2 poorly healing wounds b/l  Dyspepsia: On moderate exertion.  Sleep apnea, obstructive: Requires home 02 therapy, and treatment with BIPAP  Atelectasis  Pleural effusion, bilateral  Respiratory failure  Peripheral edema  CRI (chronic renal insufficiency)  Gout  Benign prostatic hypertrophy  Spinal stenosis  Hypercholesterolemia  GERD (gastroesophageal reflux disease)  CAD (coronary artery disease)  Hypertension  S/P angioplasty with stent  Cataract of left eye  Prostate: Surgery green light procedure.  S/P rotator cuff surgery: Right  S/P angioplasty    Allergies  No Known Allergies    SOCIAL HISTORY: Denies tobacco, etoh abuse or illicit drug use    FAMILY HISTORY: No pertinent family history in first degree relatives    MEDICATIONS  (STANDING):  ferrous    sulfate 325 milliGRAM(s) Oral daily  metoprolol 25 milliGRAM(s) Oral two times a day  pramipexole 0.125 milliGRAM(s) Oral daily  pantoprazole    Tablet 40 milliGRAM(s) Oral before breakfast  hydrALAZINE 50 milliGRAM(s) Oral daily  amLODIPine   Tablet 5 milliGRAM(s) Oral daily  lactobacillus acidophilus 1 Tablet(s) Oral two times a day with meals  allopurinol 100 milliGRAM(s) Oral daily  buDESOnide   0.5 milliGRAM(s) Respule 0.5 milliGRAM(s) Inhalation every 12 hours  doxazosin 8 milliGRAM(s) Oral at bedtime  isosorbide   mononitrate ER Tablet (IMDUR) 120 milliGRAM(s) Oral daily  gabapentin 300 milliGRAM(s) Oral daily  simvastatin 10 milliGRAM(s) Oral at bedtime  fenofibrate Tablet 145 milliGRAM(s) Oral daily  sodium chloride 0.45%. 1000 milliLiter(s) (100 mL/Hr) IV Continuous <Continuous>  influenza  Vaccine (HIGH DOSE) 0.5 milliLiter(s) IntraMuscular once    MEDICATIONS  (PRN):  ALBUTerol/ipratropium for Nebulization 3 milliLiter(s) Nebulizer every 4 hours PRN Shortness of Breath and/or Wheezing  HYDROmorphone   Tablet 2 milliGRAM(s) Oral three times a day PRN Moderate Pain (4 - 6)  loperamide 2 milliGRAM(s) Oral every 12 hours PRN Diarrhea  acetaminophen   Tablet 500 milliGRAM(s) Oral every 4 hours PRN For Temp greater than 38 C (100.4 F)    Vital Signs Last 24 Hrs  T(C): 36.3 (04 Sep 2017 05:30), Max: 37.3 (03 Sep 2017 17:38)  T(F): 97.4 (04 Sep 2017 05:30), Max: 99.1 (03 Sep 2017 17:38)  HR: 66 (04 Sep 2017 08:00) (66 - 100)  BP: 168/44 (04 Sep 2017 05:30) (154/44 - 174/70)  BP(mean): --  RR: 18 (04 Sep 2017 05:30) (16 - 20)  SpO2: 97% (04 Sep 2017 05:30) (97% - 100%)    REVIEW OF SYSTEMS:    CONSTITUTIONAL:  As per HPI.  HEENT:  Eyes:  No diplopia or blurred vision. ENT:  No earache, sore throat or runny nose.  CARDIOVASCULAR:  No pressure, squeezing, strangling, tightness, heaviness or aching about the chest, neck, axilla or epigastrium.  RESPIRATORY:  No cough, shortness of breath, PND or orthopnea.  GASTROINTESTINAL:  No nausea, vomiting or diarrhea.  GENITOURINARY:  No dysuria, frequency or urgency.  MUSCULOSKELETAL:  As per HPI.  NEUROLOGIC:  No paresthesias, fasciculations, seizures or weakness.    PHYSICAL EXAMINATION:    GENERAL APPEARANCE:  Pt. is not currently dyspneic, in no distress. Pt. is alert, oriented, and pleasant.  HEENT:  Pupils are normal and react normally. No icterus. Mucous membranes well colored.  NECK:  Supple. No lymphadenopathy. Jugular venous pressure not elevated. Carotids equal.   HEART:   The cardiac impulse has a normal quality. There are no murmurs, rubs or gallops noted  CHEST:  Chest is clear to auscultation. Normal respiratory effort.  ABDOMEN:  Soft and nontender.   EXTREMITIES:  Amputation on R, no swelling on L arm.     I&O's Summary    03 Sep 2017 07:01  -  04 Sep 2017 07:00  --------------------------------------------------------  IN: 0 mL / OUT: 100 mL / NET: -100 mL    LABS:                        9.1    10.2  )-----------( 319      ( 03 Sep 2017 18:32 )             27.9     09-04    148<H>  |  114<H>  |  35<H>  ----------------------------<  116<H>  3.5   |  26  |  1.60<H>    Ca    7.7<L>      04 Sep 2017 01:48    TPro  5.5<L>  /  Alb  2.3<L>  /  TBili  0.3  /  DBili  x   /  AST  18  /  ALT  8<L>  /  AlkPhos  45  09-03    LIVER FUNCTIONS - ( 03 Sep 2017 18:32 )  Alb: 2.3 g/dL / Pro: 5.5 gm/dL / ALK PHOS: 45 U/L / ALT: 8 U/L / AST: 18 U/L / GGT: x           PT/INR - ( 03 Sep 2017 18:32 )   PT: 46.4 sec;   INR: 4.17 ratio         PTT - ( 03 Sep 2017 18:32 )  PTT:52.5 sec      Urinalysis Basic - ( 03 Sep 2017 20:28 )    Color: Yellow / Appearance: Clear / S.015 / pH: x  Gluc: x / Ketone: Negative  / Bili: Negative / Urobili: Negative mg/dL   Blood: x / Protein: 100 mg/dL / Nitrite: Negative   Leuk Esterase: Trace / RBC: 0-2 /HPF / WBC 6-10   Sq Epi: x / Non Sq Epi: Few / Bacteria: Moderate    EKG: Normal sinus rhythm  Normal ECG    TELEMETRY: Not on tele.    CARDIAC TESTS: 2Decho- Fibrocalcific changes noted to the mitral valve leaflets with preserved   leaflet excursion.  Moderate (2+) mitral regurgitation is present.   Mild mitral annular calcification is present.   Fibrocalcific changes noted to the aortic valveleaflets with preserved   excursion.  Normal tricuspid valve structure and function.   Mild (1+) tricuspid valve regurgitation is present.   Normal pulmonic valve structure and function.   Trace to Mild pulmonic valvular regurgitation is present.   The left atrium appears mildly dilated.   The left ventricle is normal in size, wall motion and contractility.   Mild concentric left ventricular hypertrophy is present.   Estimated left ventricular ejection fraction is 55-60%.   Normal right atrium.   Normal right ventricle structure and function.   No evidence of pericardial effusion.   No evidence of pleural effusion.    RADIOLOGY & ADDITIONAL STUDIES: pending    ASSESSMENT & PLAN:

## 2017-09-14 NOTE — DISCHARGE NOTE ADULT - MEDICATION SUMMARY - MEDICATIONS TO STOP TAKING
I will STOP taking the medications listed below when I get home from the hospital:    piperacillin-tazobactam 2 g-0.25 g intravenous injection  -- 3.375 gram(s) intravenous via PICC line every 8 hours; stop on 7/29    heparin  -- 5000 unit(s) subcutaneous every 8 hours    Coumadin 3 mg oral tablet  -- 1 tab(s) by mouth once a day (at bedtime)    Procrit 20,000 units/mL injectable solution  -- 1 milliliter(s) injectable 2 times a month    loperamide 2 mg oral capsule  -- 1 cap(s) by mouth every 12 hours, As Needed    Cepacol Anesthetic  -- 1 lozenge by mouth every 4 hours, As Needed for sore throat    epoetin nelly 20,000 units/mL injectable solution  -- 1 milliliter(s) subcutaneous 2 times a month    acetaminophen 325 mg oral tablet  -- 2 tab(s) by mouth every 4 hours, As Needed -for temp>100.5 or body aches/headaches    Milk of Magnesia 8% oral suspension  -- 30 milliliter(s) by mouth once a day, As Needed for constipation

## 2017-09-14 NOTE — DISCHARGE NOTE ADULT - MEDICATION SUMMARY - MEDICATIONS TO CHANGE
I will SWITCH the dose or number of times a day I take the medications listed below when I get home from the hospital:    pantoprazole 40 mg oral delayed release tablet  -- 1 tab(s) by mouth once a day (before a meal)

## 2017-09-14 NOTE — DISCHARGE NOTE ADULT - CARE PROVIDERS DIRECT ADDRESSES
,DirectAddress_Unknown,[email or web address],DirectAddress_Unknown,DirectAddress_Unknown

## 2017-09-14 NOTE — PROGRESS NOTE ADULT - SUBJECTIVE AND OBJECTIVE BOX
Patient is a 82y old  Male who presents with a chief complaint of bright blood at rectal area (04 Sep 2017 01:34)      HPI:  82 year old male with history of CAD s/p stents (LAD, RCA bare metal around 9/16),PVD (RLE stent/ angioplasty and surgical debridment by Dr Siu 5/20,right lower extremity amputation ) A.Fib  on coumadin, Hypertension, COPD on home O2 2L, SHAYY onhistory of  nocturnal BIPAP, ex-smoker (smoked 1ppd X 50 years, quit 22 years ago),  Chronic diastolic CHF, Hyperlipidemia, PVD, Iron deficiency anemia, Chronic back pain, Gout, BPH, CKD III . 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  Per ED physician notes stool guaic positive ,on rectal exam done by ED physician there was some perianal skin tear and brown stools ,no gross blood in stools  INR in ED was 4   no active bleeding in ED ,no vitamin K given at this time  CXR no changes from 6/29/2017,no infiltrate or pleural effusion or pulmonary vascular congestion  patient is afebrile and asymptomatic in ED  reports chronic diarrhea 10 times a day for which he takes loperamide prn and lactobacillus    pt comf  bm times 3, formed yesterday    cardiology note appreciate, no SVC filter    Pmhx- see HPI  Pshx right rotator cuff repair, pilonydal cyst, RLE stent/angioplasty/surgical debridement,amputation,IVC filter  Family hx- rectal ca,HTN,DM  social hx- exsmoker quit 22 years ago,rare etoh use     ROS- patient denies any CP,SOB,abdominal pain,cough,fever,nausea,vomiting,dizziness,palpitations (03 Sep 2017 23:52)      PAST MEDICAL & SURGICAL HISTORY:  Diastolic CHF  Peripheral vascular disease  Afib  Anemia  CKD (chronic kidney disease)  COPD (chronic obstructive pulmonary disease)  SHAYY (obstructive sleep apnea)  Sepsis, due to unspecified organism: 2/2 poorly healing wounds b/l  Dyspepsia: On moderate exertion.  Sleep apnea, obstructive: Requires home 02 therapy, and treatment with BIPAP  Atelectasis  Pleural effusion, bilateral  Respiratory failure  Peripheral edema  CRI (chronic renal insufficiency)  Gout  Benign prostatic hypertrophy  Spinal stenosis  Hypercholesterolemia  GERD (gastroesophageal reflux disease)  CAD (coronary artery disease)  Hypertension  S/P angioplasty with stent  Cataract of left eye  Prostate: Surgery green light procedure.  S/P rotator cuff surgery: Right  S/P angioplasty      MEDICATIONS  (STANDING):  ferrous    sulfate 325 milliGRAM(s) Oral daily  metoprolol 25 milliGRAM(s) Oral two times a day  pramipexole 0.125 milliGRAM(s) Oral daily  lactobacillus acidophilus 1 Tablet(s) Oral two times a day with meals  allopurinol 100 milliGRAM(s) Oral daily  buDESOnide   0.5 milliGRAM(s) Respule 0.5 milliGRAM(s) Inhalation every 12 hours  doxazosin 8 milliGRAM(s) Oral at bedtime  isosorbide   mononitrate ER Tablet (IMDUR) 120 milliGRAM(s) Oral daily  gabapentin 300 milliGRAM(s) Oral daily  simvastatin 10 milliGRAM(s) Oral at bedtime  amLODIPine   Tablet 10 milliGRAM(s) Oral daily  vancomycin    Solution 125 milliGRAM(s) Oral every 6 hours  pantoprazole  Injectable 40 milliGRAM(s) IV Push every 12 hours  aspirin  chewable 81 milliGRAM(s) Oral daily    MEDICATIONS  (PRN):  ALBUTerol/ipratropium for Nebulization 3 milliLiter(s) Nebulizer every 4 hours PRN Shortness of Breath and/or Wheezing  acetaminophen   Tablet 500 milliGRAM(s) Oral every 4 hours PRN For Temp greater than 38 C (100.4 F)  zinc oxide 40%/lanolin Ointment 1 Application(s) Topical every 3 hours PRN diarrhea episodes      Allergies    Zosyn (Rash)    Intolerances        SOCIAL HISTORY:    FAMILY HISTORY:  No pertinent family history in first degree relatives      REVIEW OF SYSTEMS:    CONSTITUTIONAL: No weakness, fevers or chills  EYES/ENT: No visual changes;  No vertigo or throat pain   NECK: No pain or stiffness  RESPIRATORY: No cough, wheezing, hemoptysis; No shortness of breath  CARDIOVASCULAR: No chest pain or palpitations  GENITOURINARY: No dysuria, frequency or hematuria  NEUROLOGICAL: No numbness or weakness  SKIN: No itching, burning, rashes, or lesions   All other review of systems is negative unless indicated above.    Vital Signs Last 24 Hrs  T(C): 36.6 (14 Sep 2017 06:18), Max: 36.8 (13 Sep 2017 21:24)  T(F): 97.8 (14 Sep 2017 06:18), Max: 98.3 (13 Sep 2017 21:24)  HR: 69 (14 Sep 2017 06:18) (69 - 80)  BP: 165/46 (14 Sep 2017 06:18) (137/60 - 175/45)  BP(mean): --  RR: 17 (14 Sep 2017 06:18) (16 - 17)  SpO2: 96% (14 Sep 2017 06:18) (94% - 97%)    PHYSICAL EXAM:    Constitutional: NAD, well-developed  HEENT: EOMI, throat clear  Neck: No LAD, supple  Respiratory: CTA and P  Cardiovascular: S1 and S2, RRR, no M  Gastrointestinal: BS+, soft, NT/ND, neg HSM,  Extremities: No peripheral edema, neg clubing, cyanosis    Neurological: A/O x 3, no focal deficits  Psychiatric: Normal mood, normal affect  Skin: No rashes    LABS:  CBC Full  -  ( 14 Sep 2017 05:38 )  WBC Count : 7.1 K/uL  Hemoglobin : 8.8 g/dL  Hematocrit : 26.7 %  Platelet Count - Automated : 213 K/uL  Mean Cell Volume : 95.4 fl  Mean Cell Hemoglobin : 31.5 pg  Mean Cell Hemoglobin Concentration : 33.0 gm/dL  Auto Neutrophil # : x  Auto Lymphocyte # : x  Auto Monocyte # : x  Auto Eosinophil # : x  Auto Basophil # : x  Auto Neutrophil % : x  Auto Lymphocyte % : x  Auto Monocyte % : x  Auto Eosinophil % : x  Auto Basophil % : x    09-14    149<H>  |  113<H>  |  40<H>  ----------------------------<  108<H>  3.6   |  30  |  1.48<H>    Ca    7.8<L>      14 Sep 2017 05:38              RADIOLOGY & ADDITIONAL STUDIES:

## 2017-09-18 DIAGNOSIS — K92.1 MELENA: ICD-10-CM

## 2017-09-18 DIAGNOSIS — G47.33 OBSTRUCTIVE SLEEP APNEA (ADULT) (PEDIATRIC): ICD-10-CM

## 2017-09-18 DIAGNOSIS — Z79.899 OTHER LONG TERM (CURRENT) DRUG THERAPY: ICD-10-CM

## 2017-09-18 DIAGNOSIS — I25.10 ATHEROSCLEROTIC HEART DISEASE OF NATIVE CORONARY ARTERY WITHOUT ANGINA PECTORIS: ICD-10-CM

## 2017-09-18 DIAGNOSIS — Z87.891 PERSONAL HISTORY OF NICOTINE DEPENDENCE: ICD-10-CM

## 2017-09-18 DIAGNOSIS — E86.0 DEHYDRATION: ICD-10-CM

## 2017-09-18 DIAGNOSIS — I50.32 CHRONIC DIASTOLIC (CONGESTIVE) HEART FAILURE: ICD-10-CM

## 2017-09-18 DIAGNOSIS — N18.3 CHRONIC KIDNEY DISEASE, STAGE 3 (MODERATE): ICD-10-CM

## 2017-09-18 DIAGNOSIS — I73.9 PERIPHERAL VASCULAR DISEASE, UNSPECIFIED: ICD-10-CM

## 2017-09-18 DIAGNOSIS — K31.811 ANGIODYSPLASIA OF STOMACH AND DUODENUM WITH BLEEDING: ICD-10-CM

## 2017-09-18 DIAGNOSIS — J96.11 CHRONIC RESPIRATORY FAILURE WITH HYPOXIA: ICD-10-CM

## 2017-09-18 DIAGNOSIS — Z79.01 LONG TERM (CURRENT) USE OF ANTICOAGULANTS: ICD-10-CM

## 2017-09-18 DIAGNOSIS — D62 ACUTE POSTHEMORRHAGIC ANEMIA: ICD-10-CM

## 2017-09-18 DIAGNOSIS — K44.9 DIAPHRAGMATIC HERNIA WITHOUT OBSTRUCTION OR GANGRENE: ICD-10-CM

## 2017-09-18 DIAGNOSIS — A04.7 ENTEROCOLITIS DUE TO CLOSTRIDIUM DIFFICILE: ICD-10-CM

## 2017-09-18 DIAGNOSIS — Z99.81 DEPENDENCE ON SUPPLEMENTAL OXYGEN: ICD-10-CM

## 2017-09-18 DIAGNOSIS — N40.0 BENIGN PROSTATIC HYPERPLASIA WITHOUT LOWER URINARY TRACT SYMPTOMS: ICD-10-CM

## 2017-09-18 DIAGNOSIS — E44.0 MODERATE PROTEIN-CALORIE MALNUTRITION: ICD-10-CM

## 2017-09-18 DIAGNOSIS — I48.2 CHRONIC ATRIAL FIBRILLATION: ICD-10-CM

## 2017-09-18 DIAGNOSIS — Z79.82 LONG TERM (CURRENT) USE OF ASPIRIN: ICD-10-CM

## 2017-09-18 DIAGNOSIS — M10.9 GOUT, UNSPECIFIED: ICD-10-CM

## 2017-09-18 DIAGNOSIS — Z95.5 PRESENCE OF CORONARY ANGIOPLASTY IMPLANT AND GRAFT: ICD-10-CM

## 2017-09-18 DIAGNOSIS — E87.6 HYPOKALEMIA: ICD-10-CM

## 2017-09-18 DIAGNOSIS — J44.9 CHRONIC OBSTRUCTIVE PULMONARY DISEASE, UNSPECIFIED: ICD-10-CM

## 2017-09-18 DIAGNOSIS — E87.0 HYPEROSMOLALITY AND HYPERNATREMIA: ICD-10-CM

## 2017-09-18 DIAGNOSIS — I13.0 HYPERTENSIVE HEART AND CHRONIC KIDNEY DISEASE WITH HEART FAILURE AND STAGE 1 THROUGH STAGE 4 CHRONIC KIDNEY DISEASE, OR UNSPECIFIED CHRONIC KIDNEY DISEASE: ICD-10-CM

## 2017-09-18 DIAGNOSIS — K92.2 GASTROINTESTINAL HEMORRHAGE, UNSPECIFIED: ICD-10-CM

## 2017-09-18 DIAGNOSIS — D50.9 IRON DEFICIENCY ANEMIA, UNSPECIFIED: ICD-10-CM

## 2017-09-18 DIAGNOSIS — I82.602 ACUTE EMBOLISM AND THROMBOSIS OF UNSPECIFIED VEINS OF LEFT UPPER EXTREMITY: ICD-10-CM

## 2017-12-28 ENCOUNTER — EMERGENCY (EMERGENCY)
Facility: HOSPITAL | Age: 82
LOS: 0 days | Discharge: HOME | End: 2017-12-28
Attending: EMERGENCY MEDICINE | Admitting: EMERGENCY MEDICINE
Payer: MEDICARE

## 2017-12-28 VITALS
OXYGEN SATURATION: 100 % | WEIGHT: 175.05 LBS | RESPIRATION RATE: 16 BRPM | HEART RATE: 87 BPM | SYSTOLIC BLOOD PRESSURE: 140 MMHG | TEMPERATURE: 98 F | DIASTOLIC BLOOD PRESSURE: 49 MMHG

## 2017-12-28 DIAGNOSIS — I25.10 ATHEROSCLEROTIC HEART DISEASE OF NATIVE CORONARY ARTERY WITHOUT ANGINA PECTORIS: ICD-10-CM

## 2017-12-28 DIAGNOSIS — I73.89 OTHER SPECIFIED PERIPHERAL VASCULAR DISEASES: ICD-10-CM

## 2017-12-28 DIAGNOSIS — I12.9 HYPERTENSIVE CHRONIC KIDNEY DISEASE WITH STAGE 1 THROUGH STAGE 4 CHRONIC KIDNEY DISEASE, OR UNSPECIFIED CHRONIC KIDNEY DISEASE: ICD-10-CM

## 2017-12-28 DIAGNOSIS — Z79.899 OTHER LONG TERM (CURRENT) DRUG THERAPY: ICD-10-CM

## 2017-12-28 DIAGNOSIS — N18.9 CHRONIC KIDNEY DISEASE, UNSPECIFIED: ICD-10-CM

## 2017-12-28 DIAGNOSIS — Z95.9 PRESENCE OF CARDIAC AND VASCULAR IMPLANT AND GRAFT, UNSPECIFIED: Chronic | ICD-10-CM

## 2017-12-28 DIAGNOSIS — Z95.828 PRESENCE OF OTHER VASCULAR IMPLANTS AND GRAFTS: ICD-10-CM

## 2017-12-28 DIAGNOSIS — Z86.718 PERSONAL HISTORY OF OTHER VENOUS THROMBOSIS AND EMBOLISM: ICD-10-CM

## 2017-12-28 DIAGNOSIS — Z89.611 ACQUIRED ABSENCE OF RIGHT LEG ABOVE KNEE: ICD-10-CM

## 2017-12-28 DIAGNOSIS — K21.9 GASTRO-ESOPHAGEAL REFLUX DISEASE WITHOUT ESOPHAGITIS: ICD-10-CM

## 2017-12-28 DIAGNOSIS — Z87.891 PERSONAL HISTORY OF NICOTINE DEPENDENCE: ICD-10-CM

## 2017-12-28 DIAGNOSIS — M25.552 PAIN IN LEFT HIP: ICD-10-CM

## 2017-12-28 DIAGNOSIS — M10.9 GOUT, UNSPECIFIED: ICD-10-CM

## 2017-12-28 PROBLEM — J44.9 CHRONIC OBSTRUCTIVE PULMONARY DISEASE, UNSPECIFIED: Chronic | Status: ACTIVE | Noted: 2017-09-03

## 2017-12-28 PROBLEM — I48.91 UNSPECIFIED ATRIAL FIBRILLATION: Chronic | Status: ACTIVE | Noted: 2017-09-03

## 2017-12-28 PROBLEM — I73.9 PERIPHERAL VASCULAR DISEASE, UNSPECIFIED: Chronic | Status: ACTIVE | Noted: 2017-09-03

## 2017-12-28 PROBLEM — G47.33 OBSTRUCTIVE SLEEP APNEA (ADULT) (PEDIATRIC): Chronic | Status: ACTIVE | Noted: 2017-09-03

## 2017-12-28 PROBLEM — D64.9 ANEMIA, UNSPECIFIED: Chronic | Status: ACTIVE | Noted: 2017-09-03

## 2017-12-28 PROBLEM — I50.30 UNSPECIFIED DIASTOLIC (CONGESTIVE) HEART FAILURE: Chronic | Status: ACTIVE | Noted: 2017-09-03

## 2017-12-28 LAB
ALBUMIN SERPL ELPH-MCNC: 2.6 G/DL — LOW (ref 3.3–5)
ALP SERPL-CCNC: 64 U/L — SIGNIFICANT CHANGE UP (ref 40–120)
ALT FLD-CCNC: 18 U/L — SIGNIFICANT CHANGE UP (ref 12–78)
ANION GAP SERPL CALC-SCNC: 10 MMOL/L — SIGNIFICANT CHANGE UP (ref 5–17)
ANISOCYTOSIS BLD QL: SLIGHT — SIGNIFICANT CHANGE UP
APTT BLD: 34.7 SEC — SIGNIFICANT CHANGE UP (ref 27.5–37.4)
AST SERPL-CCNC: 15 U/L — SIGNIFICANT CHANGE UP (ref 15–37)
BILIRUB SERPL-MCNC: 0.3 MG/DL — SIGNIFICANT CHANGE UP (ref 0.2–1.2)
BUN SERPL-MCNC: 76 MG/DL — HIGH (ref 7–23)
CALCIUM SERPL-MCNC: 8.8 MG/DL — SIGNIFICANT CHANGE UP (ref 8.5–10.1)
CHLORIDE SERPL-SCNC: 116 MMOL/L — HIGH (ref 96–108)
CO2 SERPL-SCNC: 21 MMOL/L — LOW (ref 22–31)
CREAT SERPL-MCNC: 1.92 MG/DL — HIGH (ref 0.5–1.3)
ELLIPTOCYTES BLD QL SMEAR: SLIGHT — SIGNIFICANT CHANGE UP
EOSINOPHIL NFR BLD AUTO: 1 % — SIGNIFICANT CHANGE UP (ref 0–6)
GLUCOSE SERPL-MCNC: 104 MG/DL — HIGH (ref 70–99)
HCT VFR BLD CALC: 31.5 % — LOW (ref 39–50)
HGB BLD-MCNC: 10.1 G/DL — LOW (ref 13–17)
INR BLD: 1.14 RATIO — SIGNIFICANT CHANGE UP (ref 0.88–1.16)
LYMPHOCYTES # BLD AUTO: 10 % — LOW (ref 13–44)
MACROCYTES BLD QL: SLIGHT — SIGNIFICANT CHANGE UP
MANUAL DIF COMMENT BLD-IMP: SIGNIFICANT CHANGE UP
MCHC RBC-ENTMCNC: 30.4 PG — SIGNIFICANT CHANGE UP (ref 27–34)
MCHC RBC-ENTMCNC: 32.1 GM/DL — SIGNIFICANT CHANGE UP (ref 32–36)
MCV RBC AUTO: 94.8 FL — SIGNIFICANT CHANGE UP (ref 80–100)
MONOCYTES NFR BLD AUTO: 4 % — SIGNIFICANT CHANGE UP (ref 2–14)
NEUTROPHILS NFR BLD AUTO: 85 % — HIGH (ref 43–77)
OVALOCYTES BLD QL SMEAR: SLIGHT — SIGNIFICANT CHANGE UP
PLAT MORPH BLD: NORMAL — SIGNIFICANT CHANGE UP
PLATELET # BLD AUTO: 189 K/UL — SIGNIFICANT CHANGE UP (ref 150–400)
POIKILOCYTOSIS BLD QL AUTO: SLIGHT — SIGNIFICANT CHANGE UP
POTASSIUM SERPL-MCNC: 4.6 MMOL/L — SIGNIFICANT CHANGE UP (ref 3.5–5.3)
POTASSIUM SERPL-SCNC: 4.6 MMOL/L — SIGNIFICANT CHANGE UP (ref 3.5–5.3)
PROT SERPL-MCNC: 6.2 GM/DL — SIGNIFICANT CHANGE UP (ref 6–8.3)
PROTHROM AB SERPL-ACNC: 12.4 SEC — SIGNIFICANT CHANGE UP (ref 9.8–12.7)
RBC # BLD: 3.32 M/UL — LOW (ref 4.2–5.8)
RBC # FLD: 13.6 % — SIGNIFICANT CHANGE UP (ref 10.3–14.5)
RBC BLD AUTO: (no result)
SCHISTOCYTES BLD QL AUTO: SLIGHT — SIGNIFICANT CHANGE UP
SODIUM SERPL-SCNC: 147 MMOL/L — HIGH (ref 135–145)
WBC # BLD: 7.5 K/UL — SIGNIFICANT CHANGE UP (ref 3.8–10.5)
WBC # FLD AUTO: 7.5 K/UL — SIGNIFICANT CHANGE UP (ref 3.8–10.5)

## 2017-12-28 PROCEDURE — 93971 EXTREMITY STUDY: CPT | Mod: 26,LT

## 2017-12-28 PROCEDURE — 73552 X-RAY EXAM OF FEMUR 2/>: CPT | Mod: 26

## 2017-12-28 PROCEDURE — 99285 EMERGENCY DEPT VISIT HI MDM: CPT

## 2017-12-28 PROCEDURE — 73501 X-RAY EXAM HIP UNI 1 VIEW: CPT | Mod: 26

## 2017-12-28 RX ORDER — ONDANSETRON 8 MG/1
4 TABLET, FILM COATED ORAL ONCE
Qty: 0 | Refills: 0 | Status: COMPLETED | OUTPATIENT
Start: 2017-12-28 | End: 2017-12-28

## 2017-12-28 RX ORDER — MORPHINE SULFATE 50 MG/1
4 CAPSULE, EXTENDED RELEASE ORAL ONCE
Qty: 0 | Refills: 0 | Status: DISCONTINUED | OUTPATIENT
Start: 2017-12-28 | End: 2017-12-28

## 2017-12-28 RX ORDER — TRAMADOL HYDROCHLORIDE 50 MG/1
1 TABLET ORAL
Qty: 20 | Refills: 0 | OUTPATIENT
Start: 2017-12-28

## 2017-12-28 RX ADMIN — ONDANSETRON 4 MILLIGRAM(S): 8 TABLET, FILM COATED ORAL at 16:21

## 2017-12-28 RX ADMIN — MORPHINE SULFATE 4 MILLIGRAM(S): 50 CAPSULE, EXTENDED RELEASE ORAL at 16:20

## 2017-12-28 NOTE — ED PROVIDER NOTE - MEDICAL DECISION MAKING DETAILS
xray, labs, sono negative. Discussed with Dr Cadena, will follow as outpt. Tramadol prescribed, pt and family discussed with and content.

## 2017-12-28 NOTE — ED PROVIDER NOTE - MUSCULOSKELETAL, MLM
Spine appears normal. +Left leg with dried skin and erythema to the left foot. Left thigh non-tender, no swelling, no TTP over the hips/pelvis. +Amputated right leg

## 2017-12-28 NOTE — ED PROVIDER NOTE - OBJECTIVE STATEMENT
82 y/o male with PMHx of presents to the ED BIB EMS c/o left upper leg pain starting 2 days ago. Pt was transferring from wheel chair to bed and during the transfer states he felt severe pain. Portable XR was done, results negative but pt reports excruciating pain. Denies CP, SOB, abd pain. Pt confirms hx of blood clots - Dr. Clement placed a filter for a blood clot prior to right leg amputation in June 22 (vascular disease). Pt currently taking ASA 81mg. Former smoker (quit 24 years ago). Vascular surgery Dr. Clement. Bristal Dr. Masters. Cardiologist Dr. Sosa. Nephrologist Dr. Tomas.

## 2018-01-31 ENCOUNTER — OUTPATIENT (OUTPATIENT)
Dept: OUTPATIENT SERVICES | Facility: HOSPITAL | Age: 83
LOS: 1 days | Discharge: HOME | End: 2018-01-31
Payer: MEDICARE

## 2018-01-31 DIAGNOSIS — L97.801 NON-PRESSURE CHRONIC ULCER OF OTHER PART OF UNSPECIFIED LOWER LEG LIMITED TO BREAKDOWN OF SKIN: ICD-10-CM

## 2018-01-31 DIAGNOSIS — Z95.9 PRESENCE OF CARDIAC AND VASCULAR IMPLANT AND GRAFT, UNSPECIFIED: Chronic | ICD-10-CM

## 2018-01-31 PROCEDURE — G0463: CPT | Mod: 25

## 2018-01-31 PROCEDURE — 97602 WOUND(S) CARE NON-SELECTIVE: CPT

## 2018-02-02 DIAGNOSIS — J44.9 CHRONIC OBSTRUCTIVE PULMONARY DISEASE, UNSPECIFIED: ICD-10-CM

## 2018-02-02 DIAGNOSIS — I12.9 HYPERTENSIVE CHRONIC KIDNEY DISEASE WITH STAGE 1 THROUGH STAGE 4 CHRONIC KIDNEY DISEASE, OR UNSPECIFIED CHRONIC KIDNEY DISEASE: ICD-10-CM

## 2018-02-02 DIAGNOSIS — I50.30 UNSPECIFIED DIASTOLIC (CONGESTIVE) HEART FAILURE: ICD-10-CM

## 2018-02-02 DIAGNOSIS — Z83.3 FAMILY HISTORY OF DIABETES MELLITUS: ICD-10-CM

## 2018-02-02 DIAGNOSIS — L97.822 NON-PRESSURE CHRONIC ULCER OF OTHER PART OF LEFT LOWER LEG WITH FAT LAYER EXPOSED: ICD-10-CM

## 2018-02-02 DIAGNOSIS — Z87.891 PERSONAL HISTORY OF NICOTINE DEPENDENCE: ICD-10-CM

## 2018-02-02 DIAGNOSIS — I70.244 ATHEROSCLEROSIS OF NATIVE ARTERIES OF LEFT LEG WITH ULCERATION OF HEEL AND MIDFOOT: ICD-10-CM

## 2018-02-02 DIAGNOSIS — Z98.61 CORONARY ANGIOPLASTY STATUS: ICD-10-CM

## 2018-02-02 DIAGNOSIS — M48.00 SPINAL STENOSIS, SITE UNSPECIFIED: ICD-10-CM

## 2018-02-02 DIAGNOSIS — I25.10 ATHEROSCLEROTIC HEART DISEASE OF NATIVE CORONARY ARTERY WITHOUT ANGINA PECTORIS: ICD-10-CM

## 2018-02-02 DIAGNOSIS — M10.9 GOUT, UNSPECIFIED: ICD-10-CM

## 2018-02-02 DIAGNOSIS — E78.00 PURE HYPERCHOLESTEROLEMIA, UNSPECIFIED: ICD-10-CM

## 2018-02-02 DIAGNOSIS — D64.9 ANEMIA, UNSPECIFIED: ICD-10-CM

## 2018-02-02 DIAGNOSIS — Z89.611 ACQUIRED ABSENCE OF RIGHT LEG ABOVE KNEE: ICD-10-CM

## 2018-02-02 DIAGNOSIS — Z79.899 OTHER LONG TERM (CURRENT) DRUG THERAPY: ICD-10-CM

## 2018-02-02 DIAGNOSIS — I83.028 VARICOSE VEINS OF LEFT LOWER EXTREMITY WITH ULCER OTHER PART OF LOWER LEG: ICD-10-CM

## 2018-02-02 DIAGNOSIS — N18.9 CHRONIC KIDNEY DISEASE, UNSPECIFIED: ICD-10-CM

## 2018-02-02 DIAGNOSIS — Z79.82 LONG TERM (CURRENT) USE OF ASPIRIN: ICD-10-CM

## 2018-02-02 DIAGNOSIS — L97.422 NON-PRESSURE CHRONIC ULCER OF LEFT HEEL AND MIDFOOT WITH FAT LAYER EXPOSED: ICD-10-CM

## 2018-02-02 DIAGNOSIS — N40.0 BENIGN PROSTATIC HYPERPLASIA WITHOUT LOWER URINARY TRACT SYMPTOMS: ICD-10-CM

## 2018-02-02 DIAGNOSIS — K21.9 GASTRO-ESOPHAGEAL REFLUX DISEASE WITHOUT ESOPHAGITIS: ICD-10-CM

## 2018-02-02 DIAGNOSIS — Z88.0 ALLERGY STATUS TO PENICILLIN: ICD-10-CM

## 2018-02-09 ENCOUNTER — OUTPATIENT (OUTPATIENT)
Dept: OUTPATIENT SERVICES | Facility: HOSPITAL | Age: 83
LOS: 1 days | Discharge: HOME | End: 2018-02-09
Payer: MEDICARE

## 2018-02-09 DIAGNOSIS — Z95.9 PRESENCE OF CARDIAC AND VASCULAR IMPLANT AND GRAFT, UNSPECIFIED: Chronic | ICD-10-CM

## 2018-02-09 DIAGNOSIS — L97.422 NON-PRESSURE CHRONIC ULCER OF LEFT HEEL AND MIDFOOT WITH FAT LAYER EXPOSED: ICD-10-CM

## 2018-02-09 PROCEDURE — 73630 X-RAY EXAM OF FOOT: CPT

## 2018-02-09 PROCEDURE — 97602 WOUND(S) CARE NON-SELECTIVE: CPT

## 2018-02-09 PROCEDURE — 73630 X-RAY EXAM OF FOOT: CPT | Mod: 26,LT

## 2018-02-10 DIAGNOSIS — Z87.891 PERSONAL HISTORY OF NICOTINE DEPENDENCE: ICD-10-CM

## 2018-02-10 DIAGNOSIS — Z88.0 ALLERGY STATUS TO PENICILLIN: ICD-10-CM

## 2018-02-10 DIAGNOSIS — I12.9 HYPERTENSIVE CHRONIC KIDNEY DISEASE WITH STAGE 1 THROUGH STAGE 4 CHRONIC KIDNEY DISEASE, OR UNSPECIFIED CHRONIC KIDNEY DISEASE: ICD-10-CM

## 2018-02-10 DIAGNOSIS — Z79.899 OTHER LONG TERM (CURRENT) DRUG THERAPY: ICD-10-CM

## 2018-02-10 DIAGNOSIS — I70.244 ATHEROSCLEROSIS OF NATIVE ARTERIES OF LEFT LEG WITH ULCERATION OF HEEL AND MIDFOOT: ICD-10-CM

## 2018-02-10 DIAGNOSIS — K21.9 GASTRO-ESOPHAGEAL REFLUX DISEASE WITHOUT ESOPHAGITIS: ICD-10-CM

## 2018-02-10 DIAGNOSIS — L97.822 NON-PRESSURE CHRONIC ULCER OF OTHER PART OF LEFT LOWER LEG WITH FAT LAYER EXPOSED: ICD-10-CM

## 2018-02-10 DIAGNOSIS — J44.9 CHRONIC OBSTRUCTIVE PULMONARY DISEASE, UNSPECIFIED: ICD-10-CM

## 2018-02-10 DIAGNOSIS — I25.10 ATHEROSCLEROTIC HEART DISEASE OF NATIVE CORONARY ARTERY WITHOUT ANGINA PECTORIS: ICD-10-CM

## 2018-02-10 DIAGNOSIS — Z89.611 ACQUIRED ABSENCE OF RIGHT LEG ABOVE KNEE: ICD-10-CM

## 2018-02-10 DIAGNOSIS — N18.9 CHRONIC KIDNEY DISEASE, UNSPECIFIED: ICD-10-CM

## 2018-02-10 DIAGNOSIS — M48.00 SPINAL STENOSIS, SITE UNSPECIFIED: ICD-10-CM

## 2018-02-10 DIAGNOSIS — M10.9 GOUT, UNSPECIFIED: ICD-10-CM

## 2018-02-10 DIAGNOSIS — L97.422 NON-PRESSURE CHRONIC ULCER OF LEFT HEEL AND MIDFOOT WITH FAT LAYER EXPOSED: ICD-10-CM

## 2018-02-10 DIAGNOSIS — E78.00 PURE HYPERCHOLESTEROLEMIA, UNSPECIFIED: ICD-10-CM

## 2018-02-10 DIAGNOSIS — Z79.82 LONG TERM (CURRENT) USE OF ASPIRIN: ICD-10-CM

## 2018-02-10 DIAGNOSIS — N40.0 BENIGN PROSTATIC HYPERPLASIA WITHOUT LOWER URINARY TRACT SYMPTOMS: ICD-10-CM

## 2018-02-10 DIAGNOSIS — Z98.61 CORONARY ANGIOPLASTY STATUS: ICD-10-CM

## 2018-02-10 DIAGNOSIS — D64.9 ANEMIA, UNSPECIFIED: ICD-10-CM

## 2018-02-10 DIAGNOSIS — I50.30 UNSPECIFIED DIASTOLIC (CONGESTIVE) HEART FAILURE: ICD-10-CM

## 2018-02-10 DIAGNOSIS — Z83.3 FAMILY HISTORY OF DIABETES MELLITUS: ICD-10-CM

## 2018-02-10 DIAGNOSIS — I83.028 VARICOSE VEINS OF LEFT LOWER EXTREMITY WITH ULCER OTHER PART OF LOWER LEG: ICD-10-CM

## 2018-03-01 ENCOUNTER — INPATIENT (INPATIENT)
Facility: HOSPITAL | Age: 83
LOS: 6 days | Discharge: NURSING, REHAB OR LONG-TERM CARE | End: 2018-03-08
Attending: FAMILY MEDICINE | Admitting: FAMILY MEDICINE
Payer: MEDICARE

## 2018-03-01 VITALS
DIASTOLIC BLOOD PRESSURE: 63 MMHG | RESPIRATION RATE: 20 BRPM | TEMPERATURE: 99 F | OXYGEN SATURATION: 99 % | HEART RATE: 110 BPM | WEIGHT: 160.06 LBS | SYSTOLIC BLOOD PRESSURE: 133 MMHG | HEIGHT: 66 IN

## 2018-03-01 DIAGNOSIS — Z95.9 PRESENCE OF CARDIAC AND VASCULAR IMPLANT AND GRAFT, UNSPECIFIED: Chronic | ICD-10-CM

## 2018-03-01 LAB
ALBUMIN SERPL ELPH-MCNC: 1.9 G/DL — LOW (ref 3.3–5)
ALP SERPL-CCNC: 62 U/L — SIGNIFICANT CHANGE UP (ref 40–120)
ALT FLD-CCNC: 75 U/L — SIGNIFICANT CHANGE UP (ref 12–78)
ANION GAP SERPL CALC-SCNC: 10 MMOL/L — SIGNIFICANT CHANGE UP (ref 5–17)
ANISOCYTOSIS BLD QL: SLIGHT — SIGNIFICANT CHANGE UP
APTT BLD: 32.3 SEC — SIGNIFICANT CHANGE UP (ref 27.5–37.4)
AST SERPL-CCNC: 58 U/L — HIGH (ref 15–37)
BASOPHILS # BLD AUTO: 0.1 K/UL — SIGNIFICANT CHANGE UP (ref 0–0.2)
BASOPHILS NFR BLD AUTO: 0.9 % — SIGNIFICANT CHANGE UP (ref 0–2)
BILIRUB SERPL-MCNC: 0.2 MG/DL — SIGNIFICANT CHANGE UP (ref 0.2–1.2)
BLD GP AB SCN SERPL QL: SIGNIFICANT CHANGE UP
BUN SERPL-MCNC: 82 MG/DL — HIGH (ref 7–23)
CALCIUM SERPL-MCNC: 7.9 MG/DL — LOW (ref 8.5–10.1)
CHLORIDE SERPL-SCNC: 115 MMOL/L — HIGH (ref 96–108)
CO2 SERPL-SCNC: 18 MMOL/L — LOW (ref 22–31)
CREAT SERPL-MCNC: 2.29 MG/DL — HIGH (ref 0.5–1.3)
ELLIPTOCYTES BLD QL SMEAR: SLIGHT — SIGNIFICANT CHANGE UP
EOSINOPHIL # BLD AUTO: 0.2 K/UL — SIGNIFICANT CHANGE UP (ref 0–0.5)
EOSINOPHIL NFR BLD AUTO: 1.5 % — SIGNIFICANT CHANGE UP (ref 0–6)
GLUCOSE SERPL-MCNC: 114 MG/DL — HIGH (ref 70–99)
HCT VFR BLD CALC: 26.6 % — LOW (ref 39–50)
HGB BLD-MCNC: 8.3 G/DL — LOW (ref 13–17)
INR BLD: 1.12 RATIO — SIGNIFICANT CHANGE UP (ref 0.88–1.16)
LACTATE SERPL-SCNC: 0.7 MMOL/L — SIGNIFICANT CHANGE UP (ref 0.7–2)
LACTATE SERPL-SCNC: 1 MMOL/L — SIGNIFICANT CHANGE UP (ref 0.7–2)
LYMPHOCYTES # BLD AUTO: 0.5 K/UL — LOW (ref 1–3.3)
LYMPHOCYTES # BLD AUTO: 4.5 % — LOW (ref 13–44)
MANUAL DIF COMMENT BLD-IMP: SIGNIFICANT CHANGE UP
MCHC RBC-ENTMCNC: 29 PG — SIGNIFICANT CHANGE UP (ref 27–34)
MCHC RBC-ENTMCNC: 31.3 GM/DL — LOW (ref 32–36)
MCV RBC AUTO: 92.7 FL — SIGNIFICANT CHANGE UP (ref 80–100)
MONOCYTES # BLD AUTO: 0.7 K/UL — SIGNIFICANT CHANGE UP (ref 0–0.9)
MONOCYTES NFR BLD AUTO: 7.1 % — SIGNIFICANT CHANGE UP (ref 2–14)
NEUTROPHILS # BLD AUTO: 8.6 K/UL — HIGH (ref 1.8–7.4)
NEUTROPHILS NFR BLD AUTO: 85.9 % — HIGH (ref 43–77)
OVALOCYTES BLD QL SMEAR: SLIGHT — SIGNIFICANT CHANGE UP
PLAT MORPH BLD: NORMAL — SIGNIFICANT CHANGE UP
PLATELET # BLD AUTO: 251 K/UL — SIGNIFICANT CHANGE UP (ref 150–400)
POIKILOCYTOSIS BLD QL AUTO: SLIGHT — SIGNIFICANT CHANGE UP
POLYCHROMASIA BLD QL SMEAR: SLIGHT — SIGNIFICANT CHANGE UP
POTASSIUM SERPL-MCNC: 5.1 MMOL/L — SIGNIFICANT CHANGE UP (ref 3.5–5.3)
POTASSIUM SERPL-SCNC: 5.1 MMOL/L — SIGNIFICANT CHANGE UP (ref 3.5–5.3)
PROT SERPL-MCNC: 5.8 GM/DL — LOW (ref 6–8.3)
PROTHROM AB SERPL-ACNC: 12.1 SEC — SIGNIFICANT CHANGE UP (ref 9.8–12.7)
RBC # BLD: 2.87 M/UL — LOW (ref 4.2–5.8)
RBC # FLD: 14.4 % — SIGNIFICANT CHANGE UP (ref 10.3–14.5)
RBC BLD AUTO: (no result)
SCHISTOCYTES BLD QL AUTO: SLIGHT — SIGNIFICANT CHANGE UP
SODIUM SERPL-SCNC: 143 MMOL/L — SIGNIFICANT CHANGE UP (ref 135–145)
TROPONIN I SERPL-MCNC: 0.02 NG/ML — SIGNIFICANT CHANGE UP (ref 0.01–0.04)
TROPONIN I SERPL-MCNC: 0.02 NG/ML — SIGNIFICANT CHANGE UP (ref 0.01–0.04)
TYPE + AB SCN PNL BLD: SIGNIFICANT CHANGE UP
WBC # BLD: 10 K/UL — SIGNIFICANT CHANGE UP (ref 3.8–10.5)
WBC # FLD AUTO: 10 K/UL — SIGNIFICANT CHANGE UP (ref 3.8–10.5)

## 2018-03-01 PROCEDURE — 99291 CRITICAL CARE FIRST HOUR: CPT

## 2018-03-01 PROCEDURE — 70450 CT HEAD/BRAIN W/O DYE: CPT | Mod: 26

## 2018-03-01 PROCEDURE — 93010 ELECTROCARDIOGRAM REPORT: CPT

## 2018-03-01 PROCEDURE — 99285 EMERGENCY DEPT VISIT HI MDM: CPT

## 2018-03-01 PROCEDURE — 71045 X-RAY EXAM CHEST 1 VIEW: CPT | Mod: 26

## 2018-03-01 RX ORDER — DOXAZOSIN MESYLATE 4 MG
8 TABLET ORAL AT BEDTIME
Qty: 0 | Refills: 0 | Status: DISCONTINUED | OUTPATIENT
Start: 2018-03-01 | End: 2018-03-08

## 2018-03-01 RX ORDER — AZITHROMYCIN 500 MG/1
500 TABLET, FILM COATED ORAL EVERY 24 HOURS
Qty: 0 | Refills: 0 | Status: DISCONTINUED | OUTPATIENT
Start: 2018-03-01 | End: 2018-03-08

## 2018-03-01 RX ORDER — ASPIRIN/CALCIUM CARB/MAGNESIUM 324 MG
81 TABLET ORAL DAILY
Qty: 0 | Refills: 0 | Status: DISCONTINUED | OUTPATIENT
Start: 2018-03-01 | End: 2018-03-08

## 2018-03-01 RX ORDER — ALLOPURINOL 300 MG
100 TABLET ORAL DAILY
Qty: 0 | Refills: 0 | Status: DISCONTINUED | OUTPATIENT
Start: 2018-03-01 | End: 2018-03-08

## 2018-03-01 RX ORDER — SIMVASTATIN 20 MG/1
10 TABLET, FILM COATED ORAL AT BEDTIME
Qty: 0 | Refills: 0 | Status: DISCONTINUED | OUTPATIENT
Start: 2018-03-01 | End: 2018-03-08

## 2018-03-01 RX ORDER — VANCOMYCIN HCL 1 G
1800 VIAL (EA) INTRAVENOUS ONCE
Qty: 0 | Refills: 0 | Status: DISCONTINUED | OUTPATIENT
Start: 2018-03-01 | End: 2018-03-01

## 2018-03-01 RX ORDER — AZTREONAM 2 G
500 VIAL (EA) INJECTION ONCE
Qty: 0 | Refills: 0 | Status: COMPLETED | OUTPATIENT
Start: 2018-03-01 | End: 2018-03-01

## 2018-03-01 RX ORDER — FUROSEMIDE 40 MG
40 TABLET ORAL DAILY
Qty: 0 | Refills: 0 | Status: DISCONTINUED | OUTPATIENT
Start: 2018-03-01 | End: 2018-03-03

## 2018-03-01 RX ORDER — IPRATROPIUM/ALBUTEROL SULFATE 18-103MCG
3 AEROSOL WITH ADAPTER (GRAM) INHALATION
Qty: 0 | Refills: 0 | COMMUNITY

## 2018-03-01 RX ORDER — ONDANSETRON 8 MG/1
4 TABLET, FILM COATED ORAL EVERY 6 HOURS
Qty: 0 | Refills: 0 | Status: DISCONTINUED | OUTPATIENT
Start: 2018-03-01 | End: 2018-03-08

## 2018-03-01 RX ORDER — GABAPENTIN 400 MG/1
300 CAPSULE ORAL
Qty: 0 | Refills: 0 | Status: DISCONTINUED | OUTPATIENT
Start: 2018-03-01 | End: 2018-03-07

## 2018-03-01 RX ORDER — AZTREONAM 2 G
VIAL (EA) INJECTION
Qty: 0 | Refills: 0 | Status: DISCONTINUED | OUTPATIENT
Start: 2018-03-01 | End: 2018-03-01

## 2018-03-01 RX ORDER — AMLODIPINE BESYLATE 2.5 MG/1
5 TABLET ORAL DAILY
Qty: 0 | Refills: 0 | Status: DISCONTINUED | OUTPATIENT
Start: 2018-03-01 | End: 2018-03-08

## 2018-03-01 RX ORDER — MAGNESIUM HYDROXIDE 400 MG/1
30 TABLET, CHEWABLE ORAL
Qty: 0 | Refills: 0 | COMMUNITY

## 2018-03-01 RX ORDER — TRAMADOL HYDROCHLORIDE 50 MG/1
50 TABLET ORAL AT BEDTIME
Qty: 0 | Refills: 0 | Status: DISCONTINUED | OUTPATIENT
Start: 2018-03-01 | End: 2018-03-06

## 2018-03-01 RX ORDER — BUDESONIDE, MICRONIZED 100 %
0.5 POWDER (GRAM) MISCELLANEOUS
Qty: 0 | Refills: 0 | Status: DISCONTINUED | OUTPATIENT
Start: 2018-03-01 | End: 2018-03-08

## 2018-03-01 RX ORDER — HEPARIN SODIUM 5000 [USP'U]/ML
5000 INJECTION INTRAVENOUS; SUBCUTANEOUS EVERY 8 HOURS
Qty: 0 | Refills: 0 | Status: DISCONTINUED | OUTPATIENT
Start: 2018-03-01 | End: 2018-03-08

## 2018-03-01 RX ORDER — CEFTRIAXONE 500 MG/1
1000 INJECTION, POWDER, FOR SOLUTION INTRAMUSCULAR; INTRAVENOUS EVERY 24 HOURS
Qty: 0 | Refills: 0 | Status: DISCONTINUED | OUTPATIENT
Start: 2018-03-01 | End: 2018-03-08

## 2018-03-01 RX ORDER — SODIUM CHLORIDE 9 MG/ML
500 INJECTION INTRAMUSCULAR; INTRAVENOUS; SUBCUTANEOUS
Qty: 0 | Refills: 0 | Status: COMPLETED | OUTPATIENT
Start: 2018-03-01 | End: 2018-03-01

## 2018-03-01 RX ORDER — CHOLECALCIFEROL (VITAMIN D3) 125 MCG
1000 CAPSULE ORAL DAILY
Qty: 0 | Refills: 0 | Status: DISCONTINUED | OUTPATIENT
Start: 2018-03-01 | End: 2018-03-08

## 2018-03-01 RX ORDER — LORATADINE 10 MG/1
10 TABLET ORAL DAILY
Qty: 0 | Refills: 0 | Status: DISCONTINUED | OUTPATIENT
Start: 2018-03-01 | End: 2018-03-06

## 2018-03-01 RX ORDER — HYDRALAZINE HCL 50 MG
50 TABLET ORAL DAILY
Qty: 0 | Refills: 0 | Status: DISCONTINUED | OUTPATIENT
Start: 2018-03-01 | End: 2018-03-08

## 2018-03-01 RX ORDER — METOPROLOL TARTRATE 50 MG
25 TABLET ORAL
Qty: 0 | Refills: 0 | Status: DISCONTINUED | OUTPATIENT
Start: 2018-03-01 | End: 2018-03-08

## 2018-03-01 RX ORDER — PANTOPRAZOLE SODIUM 20 MG/1
40 TABLET, DELAYED RELEASE ORAL
Qty: 0 | Refills: 0 | Status: DISCONTINUED | OUTPATIENT
Start: 2018-03-01 | End: 2018-03-08

## 2018-03-01 RX ORDER — POTASSIUM CHLORIDE 20 MEQ
1 PACKET (EA) ORAL
Qty: 0 | Refills: 0 | COMMUNITY

## 2018-03-01 RX ORDER — FERROUS SULFATE 325(65) MG
325 TABLET ORAL DAILY
Qty: 0 | Refills: 0 | Status: DISCONTINUED | OUTPATIENT
Start: 2018-03-01 | End: 2018-03-08

## 2018-03-01 RX ORDER — ISOSORBIDE MONONITRATE 60 MG/1
120 TABLET, EXTENDED RELEASE ORAL DAILY
Qty: 0 | Refills: 0 | Status: DISCONTINUED | OUTPATIENT
Start: 2018-03-01 | End: 2018-03-08

## 2018-03-01 RX ORDER — TRAMADOL HYDROCHLORIDE 50 MG/1
25 TABLET ORAL
Qty: 0 | Refills: 0 | Status: DISCONTINUED | OUTPATIENT
Start: 2018-03-01 | End: 2018-03-07

## 2018-03-01 RX ORDER — SODIUM CHLORIDE 9 MG/ML
3 INJECTION INTRAMUSCULAR; INTRAVENOUS; SUBCUTANEOUS ONCE
Qty: 0 | Refills: 0 | Status: COMPLETED | OUTPATIENT
Start: 2018-03-01 | End: 2018-03-01

## 2018-03-01 RX ORDER — VANCOMYCIN HCL 1 G
1000 VIAL (EA) INTRAVENOUS ONCE
Qty: 0 | Refills: 0 | Status: COMPLETED | OUTPATIENT
Start: 2018-03-01 | End: 2018-03-01

## 2018-03-01 RX ORDER — ACETAMINOPHEN 500 MG
650 TABLET ORAL EVERY 6 HOURS
Qty: 0 | Refills: 0 | Status: DISCONTINUED | OUTPATIENT
Start: 2018-03-01 | End: 2018-03-08

## 2018-03-01 RX ADMIN — Medication 100 MILLIGRAM(S): at 17:32

## 2018-03-01 RX ADMIN — SIMVASTATIN 10 MILLIGRAM(S): 20 TABLET, FILM COATED ORAL at 22:58

## 2018-03-01 RX ADMIN — Medication 8 MILLIGRAM(S): at 22:59

## 2018-03-01 RX ADMIN — SODIUM CHLORIDE 2000 MILLILITER(S): 9 INJECTION INTRAMUSCULAR; INTRAVENOUS; SUBCUTANEOUS at 11:18

## 2018-03-01 RX ADMIN — Medication 250 MILLIGRAM(S): at 12:15

## 2018-03-01 RX ADMIN — Medication 325 MILLIGRAM(S): at 17:32

## 2018-03-01 RX ADMIN — AZITHROMYCIN 255 MILLIGRAM(S): 500 TABLET, FILM COATED ORAL at 18:41

## 2018-03-01 RX ADMIN — GABAPENTIN 300 MILLIGRAM(S): 400 CAPSULE ORAL at 17:32

## 2018-03-01 RX ADMIN — SODIUM CHLORIDE 2000 MILLILITER(S): 9 INJECTION INTRAMUSCULAR; INTRAVENOUS; SUBCUTANEOUS at 11:33

## 2018-03-01 RX ADMIN — SODIUM CHLORIDE 3 MILLILITER(S): 9 INJECTION INTRAMUSCULAR; INTRAVENOUS; SUBCUTANEOUS at 11:44

## 2018-03-01 RX ADMIN — SODIUM CHLORIDE 2000 MILLILITER(S): 9 INJECTION INTRAMUSCULAR; INTRAVENOUS; SUBCUTANEOUS at 12:03

## 2018-03-01 RX ADMIN — Medication 81 MILLIGRAM(S): at 22:58

## 2018-03-01 RX ADMIN — SODIUM CHLORIDE 2000 MILLILITER(S): 9 INJECTION INTRAMUSCULAR; INTRAVENOUS; SUBCUTANEOUS at 12:30

## 2018-03-01 RX ADMIN — TRAMADOL HYDROCHLORIDE 25 MILLIGRAM(S): 50 TABLET ORAL at 17:32

## 2018-03-01 RX ADMIN — Medication 50 MILLIGRAM(S): at 14:42

## 2018-03-01 RX ADMIN — CEFTRIAXONE 1000 MILLIGRAM(S): 500 INJECTION, POWDER, FOR SOLUTION INTRAMUSCULAR; INTRAVENOUS at 18:41

## 2018-03-01 RX ADMIN — TRAMADOL HYDROCHLORIDE 50 MILLIGRAM(S): 50 TABLET ORAL at 22:58

## 2018-03-01 RX ADMIN — Medication 1000 UNIT(S): at 17:32

## 2018-03-01 RX ADMIN — SODIUM CHLORIDE 2000 MILLILITER(S): 9 INJECTION INTRAMUSCULAR; INTRAVENOUS; SUBCUTANEOUS at 11:48

## 2018-03-01 RX ADMIN — LORATADINE 10 MILLIGRAM(S): 10 TABLET ORAL at 17:32

## 2018-03-01 NOTE — H&P ADULT - NSHPOUTPATIENTPROVIDERS_GEN_ALL_CORE
PMD: Dr. Masters and Dr. Hannah  Cardio: Dr. Sosa  Pulm: Dr. Maxwell  Nephro: Dr. Tomas  GI: Dr. Crandall  Vascular: Dr. Mccracken

## 2018-03-01 NOTE — INPATIENT CERTIFICATION FOR MEDICARE PATIENTS - RISKS OF ADVERSE EVENTS
Concern for neurologic deterioration/Concern for cardiopulmonary deterioration/Concern for worsening infectious process

## 2018-03-01 NOTE — ED ADULT NURSE NOTE - OBJECTIVE STATEMENT
Pt is a 83y male, A & Ox 3, VSS, presents to ED w/ hyperglycemia, weakness (for several weeks), pt daughter states pt has chronic left side weakness, pt states he left had is numb & painful,  unknown onset, MD Dykes made aware. + pulse, motor & sensation, pt denies trauma, pt has rectal temp 100.8, , MD dykes aware, face symmetrical negative arm drift, pt denies chest pain SOB, pt has right AKA, chronic cellulitis right lower extremity. As per EMS, pt BGM read "HI" on monitor, 134 BGM upon arrival in ED,  in no apparent distress, bed rails up, will continue to monitor.

## 2018-03-01 NOTE — ED ADULT TRIAGE NOTE - CHIEF COMPLAINT QUOTE
pt presents with hyperglycemia not diabetic , pt had hyperkalemia was given Kayexalate that causes diarrhea, pt at facility read HI but in dept its 134 , pt has chronic left arm weakness, pt has left AKA

## 2018-03-01 NOTE — ED PROVIDER NOTE - OBJECTIVE STATEMENT
82 y/o M with a PMhx of R AKA, AFIB, CAD, CKD, COPD, CRI, GERD, HTN, HLD, PVD, spinal stenosis presents to the ED being sent in by [facility] staff for LUE weakness today. Pt family at bedside states that he was recently started on Kayexalate for hyperkalemia, experiencing diarrhea. Pt family states that he usually has a good appetite in the morning, able to eat, but today he was lethargic appearing, slumped over, EMS called. EMS states that his BGM read high upon their arrival, but 134mg/dL here in ED. Pt currently calm, cooperative with exam and denies CP, SOB, abd pain, NVD or any other acute c/o at this time.

## 2018-03-01 NOTE — H&P ADULT - ASSESSMENT
83 year old male with history of CAD s/p stents (LAD, RCA bare metal around 9/16), PVD s/p Right BKA, A.Fib (not on AC), Hypertension, COPD on home O2 2L, SHAYY,  Chronic diastolic CHF, Hyperlipidemia, PVD, Iron deficiency anemia, Chronic back pain, Gout, BPH, CKD III . Hx of GI bleed, RLE and RUE DVTs sent in from [place] assisted living with weakness in LUE and slumping over to his left side which started around 745 this morning. He arrived to the ED around 1030. Code stroke was called and CT head negative for acute CVA. Pt not a candidate for TPA. Currently pt resting comfortably. Complains of chronic back pain. Continues to have weakness in LUE. No headaches, changes in vision, dizziness, chest pain, palpitations, SOB, abd pain, N/V, diarrhea, cough. Pt has been following Dr. Mccracken for LLE cellulitis and was recently taken off abx, he saw a wound care specialist and is pending an MRI of the left foot to r/o OM. He has also been following closely with renal Dr. Tomas for ARYA on CKD and for hyperkalemia for which he received Kayexalate one day prior to admission. Pt also noted dark stools secondary to iron supplements and was scheduled to follow up with GI.     #LUE weakness - acute CVA  - admit to tele  - NIHSS of 3 on admission  - neuro consult appreciated  - MRI/MRA head/neck - pt with hemostatic clips in stomach from enteroscopy in September 2017, MR compatible but at lower settings, awaiting info from pt's daughter and/or Dr. Miller office regarding MRI settings prior to ordering MRI  - echo  - continue ASA and statin  - neuro checks  - speech/swallow  - PT  - cardio consult    #RUL PNA  - community acquired, likely gram negative bacterial PNA  - s/p vanco and azactam in ED  - continue IV abx - ceftriaxone and azithromycin  - f/u cultures    #LLE with chronic stasis changes and base of heal ulcer  - pt has been following Dr. Mccracken and wound care at [facility], currently not on abx, wound care recommended MRI foot to r/o OM  - vascular consult  - obtain MRI foot once info for GI hemostatic clips obtained    #CKD stage 3  - renal consult Dr. Tomas  - as per daughter Cr improved today, previously 2.7  - monitor renal panel    #Anemia - h/o BARRY and GI bleed  - check stool occult blood  - monitor H/H    #Chronic diastolic CHF  - currently appears compensated  - continue lasix, BB, imdur    #CAD s/p stents/ A fib  - continue ASA, BB, statin  - not on AC secondary to GI bleed in past    #COPD  - continue home O2  - continue inhaled steroids    #HTN/HLD  - continue home meds    #DVT prophylaxis  - heparin sq

## 2018-03-01 NOTE — ED PROVIDER NOTE - CONSTITUTIONAL, MLM
normal... Calm appearing, drowsy elderly male, awake, able to answer questioning and in no distress.

## 2018-03-01 NOTE — H&P ADULT - HISTORY OF PRESENT ILLNESS
83 year old male with history of CAD s/p stents (LAD, RCA bare metal around 9/16), PVD s/p Right BKA, A.Fib (not on AC), Hypertension, COPD on home O2 2L, SHAYY,  Chronic diastolic CHF, Hyperlipidemia, PVD, Iron deficiency anemia, Chronic back pain, Gout, BPH, CKD III . Hx of GI bleed, RLE and RUE DVTs sent in from [place] assisted living with weakness in LUE and slumping over to his left side which started around 745 this morning. He arrived to the ED around 1030. Code stroke was called and CT head negative for acute CVA. Pt not a candidate for TPA. Currently pt resting comfortably. Complains of chronic back pain. Continues to have weakness in LUE. No headaches, changes in vision, dizziness, chest pain, palpitations, SOB, abd pain, N/V, diarrhea, cough. Pt has been following Dr. Mccracken for LLE cellulitis and was recently taken off abx, he saw a wound care specialist and is pending an MRI of the left foot to r/o OM. He has also been following closely with renal Dr. Tomas for ARYA on CKD and for hyperkalemia for which he received Kayexalate one day prior to admission.  Pt also noted dark stools secondary to iron supplements and was scheduled to follow up with GI.

## 2018-03-01 NOTE — ED PROVIDER NOTE - PROGRESS NOTE DETAILS
Code Stroke delayed secondary to pts septic presentation. CTA canceled secondary to renal insufficiency.

## 2018-03-01 NOTE — ED PROVIDER NOTE - MEDICAL DECISION MAKING DETAILS
84 y/o M being sent in by [place] [facility] for LUE weakness, lethargy noticed today while pt was eating breakfast at facility with plans to receive labs, CBC, CT imaging 82 y/o M being sent in by [facility] for LUE weakness, lethargy noticed today while pt was eating breakfast at facility, supposed high BGM as per EMS with plans to receive labs, CBC, CT imaging

## 2018-03-01 NOTE — ED PROVIDER NOTE - CRITICAL CARE PROVIDED
additional history taking/documentation/consult w/ pt's family directly relating to pts condition/consultation with other physicians/interpretation of diagnostic studies/direct patient care (not related to procedure)

## 2018-03-01 NOTE — ED PROVIDER NOTE - CARE PLAN
Principal Discharge DX:	CVA (cerebral vascular accident)  Secondary Diagnosis:	HCAP (healthcare-associated pneumonia)

## 2018-03-01 NOTE — CONSULT NOTE ADULT - SUBJECTIVE AND OBJECTIVE BOX
Patient is a 83y old  Male who presents with a chief complaint of left hand weakness    HPI: Pt is an 83 year old man with extensive past medical history including CKI, HTN, CAD, A-fib on aspirin who presented to the hospital from his assisted living facility c/o left hand weakness. Pt states he noticed his left hand was not moving properly. The staff at the facility noticed he was listing to left and was unable to push his wheelchair at 7:45am.  Code stroke was called at 11:29. Pt was awake and alert, he followed commands, he c/o of his left wrist weakness. CT of the head was negative for acute stroke or hemorrhage. No CTA was done as the patient has known renal disease. NIHSS 3. Pt not a candidate for t-PA as there was no clear time of last known normal.     PAST MEDICAL & SURGICAL HISTORY:  Diastolic CHF  Peripheral vascular disease  Afib  Anemia  CKD (chronic kidney disease)  COPD (chronic obstructive pulmonary disease)  SHAYY (obstructive sleep apnea)  Sepsis, due to unspecified organism: 2/2 poorly healing wounds b/l  Dyspepsia: On moderate exertion.  Sleep apnea, obstructive: Requires home 02 therapy, and treatment with BIPAP  Atelectasis  Pleural effusion, bilateral  Respiratory failure  Peripheral edema  CRI (chronic renal insufficiency)  Gout  Benign prostatic hypertrophy  Spinal stenosis  Hypercholesterolemia  GERD (gastroesophageal reflux disease)  CAD (coronary artery disease)  Hypertension  S/P angioplasty with stent  Cataract of left eye  Prostate: Surgery green light procedure.  S/P rotator cuff surgery: Right  S/P angioplasty  S/P R AKA  S/P GI bleed- transfused 14 units     FAMILY HISTORY:  No pertinent family history in first degree relatives     Social Hx:  Nonsmoker, no drug or alcohol use    MEDICATIONS  (STANDING):  aztreonam  IVPB         Allergies: Zosyn (Rash)    ROS: Pertinent positives in HPI, all other ROS were reviewed and are negative.      Vital Signs Last 24 Hrs  T(C): 38.2 (01 Mar 2018 11:05), Max: 38.2 (01 Mar 2018 11:05)  T(F): 100.8 (01 Mar 2018 11:05), Max: 100.8 (01 Mar 2018 11:05)  HR: 110 (01 Mar 2018 10:24) (110 - 110)  BP: 133/63 (01 Mar 2018 10:24) (133/63 - 133/63)  RR: 20 (01 Mar 2018 10:24) (20 - 20)  SpO2: 99% (01 Mar 2018 10:24) (99% - 99%)    PHYSICAL EXAM:  Constitutional: awake and alert  HEENT: PERRLA, EOMI,  Neck: Supple  Respiratory: Breath sounds are clear bilaterally  Cardiovascular: S1 and S2, irregular rhythm  Gastrointestinal: soft, nontender  Extremities:  no edema  Vascular: Carotid Bruit - no  Musculoskeletal: s/p RIGHT AKA- stump well healed, LLE in an Emilee boot for chronic ulcer due to PVD  Skin: Emilee boot not removed    Neurological exam:  HF: AxO x 3. Appropriately interactive, normal affect. Speech fluent, No Aphasia or paraphasic errors. Naming /repetition intact   CN: PEARRL, EOMI, VFF, facial sensation normal, no NLFD, tongue midline, Palate moves equally, SCM equal bilaterally  Motor: No pronator drift, RUE 5/5, LUE 5/5prox, 1/5 distal, RLE s/p amputation, LLE 2/5  Sens: Intact to light touch / PP/ VS/ JS    Reflexes: 2+ left patella unable to test babinski   Coord:  dysmetria finger to nose LUE   Gait/Balance: Non ambulatory at baseline     NIHSS: 3    Labs:                        8.3    10.0  )-----------( 251      ( 01 Mar 2018 10:46 )             26.6     03-01    143  |  115<H>  |  82<H>  ----------------------------<  114<H>  5.1   |  18<L>  |  2.29<H>    Ca    7.9<L>      01 Mar 2018 10:46    TPro  5.8<L>  /  Alb  1.9<L>  /  TBili  0.2  /  DBili  x   /  AST  58<H>  /  ALT  75  /  AlkPhos  62  03-01    PT/INR - ( 01 Mar 2018 10:46 )   PT: 12.1 sec;   INR: 1.12 ratio       PTT - ( 01 Mar 2018 10:46 )  PTT:32.3 sec    RADIOLOGY:  CT Brain Stroke Protocol (03.01.18 @ 12:28)   The brain demonstrates old lacunar infarction in the caudate nucleus.   Mild periventricular white matter ischemia is noted. No acute cerebral   cortical infarct is seen.  No intracranial hemorrhage is found.  No mass   effect is found in the brain. CC: 83y old  Male who presents with a chief complaint of left hand weakness    HPI: Pt is an 83 year old man with extensive past medical history including CKI, HTN, CAD, GIB, A-fib on aspirin who presented to the hospital AT 10.24 a.m. from assisted living facility with c/o left hand weakness. Pt states he noticed his left hand was not moving properly; staff at the facility noticed he was listing to left side and was unable to push his wheelchair at 7:45 am. Code stroke was called at 11:29. At the time of neuro exam, pt was awake and alert, he followed commands, was noted to have left wrist weakness. CT of the head was negative for acute stroke or hemorrhage.    CT Angio was not done as the patient has known renal disease.     NIHSS 3, pt not a candidate for t-PA as there was no clear time of last known normal, clearly outside of 3 hour window period.     PAST MEDICAL & SURGICAL HISTORY:  Diastolic CHF  Peripheral vascular disease  Afib  Anemia  CKD (chronic kidney disease)  COPD (chronic obstructive pulmonary disease)  SHAYY (obstructive sleep apnea)  Sepsis, due to unspecified organism: 2/2 poorly healing wounds b/l  Dyspepsia: On moderate exertion.  Sleep apnea, obstructive: Requires home 02 therapy, and treatment with BIPAP  Atelectasis  Pleural effusion, bilateral  Respiratory failure  Peripheral edema  CRI (chronic renal insufficiency)  Gout  Benign prostatic hypertrophy  Spinal stenosis  Hypercholesterolemia  GERD (gastroesophageal reflux disease)  CAD (coronary artery disease)  Hypertension  S/P angioplasty with stent  Cataract of left eye  Prostate: Surgery green light procedure.  S/P rotator cuff surgery: Right  S/P angioplasty  S/P R AKA  S/P GI bleed- transfused 14 units     FAMILY HISTORY:  No pertinent family history in first degree relatives     Social Hx:  Nonsmoker, no drug or alcohol use    MEDICATIONS  (STANDING):  aztreonam  IVPB         Allergies: Zosyn (Rash)    ROS: Pertinent positives in HPI, all other ROS were reviewed and are negative.      Vital Signs Last 24 Hrs  T(C): 38.2 (01 Mar 2018 11:05), Max: 38.2 (01 Mar 2018 11:05)  T(F): 100.8 (01 Mar 2018 11:05), Max: 100.8 (01 Mar 2018 11:05)  HR: 110 (01 Mar 2018 10:24) (110 - 110)  BP: 133/63 (01 Mar 2018 10:24) (133/63 - 133/63)  RR: 20 (01 Mar 2018 10:24) (20 - 20)  SpO2: 99% (01 Mar 2018 10:24) (99% - 99%)    PHYSICAL EXAM:  Constitutional: awake and alert  HEENT: PERRLA, EOMI,  Neck: Supple  Respiratory: Breath sounds are clear bilaterally  Cardiovascular: S1 and S2, irregular rhythm  Gastrointestinal: soft, nontender  Extremities:  no edema  Vascular: Carotid Bruit - no  Musculoskeletal: s/p RIGHT AKA- stump well healed, LLE in an Emilee boot for chronic ulcer due to PVD  Skin: Emilee boot not removed    Neurological exam:  HF: AxO x 3. Appropriately interactive, normal affect. Speech fluent, No Aphasia or paraphasic errors. Naming /repetition intact   CN: PEARRL, EOMI, VFF, facial sensation normal, no NLFD, tongue midline, Palate moves equally, SCM equal bilaterally  Motor: No pronator drift, RUE 5/5, LUE 5/5prox, 1/5 distal, RLE s/p amputation, LLE 2/5  Sens: Intact to light touch / PP/ VS/ JS    Reflexes: 2+ left patella unable to test babinski   Coord:  dysmetria finger to nose LUE   Gait/Balance: Non ambulatory at baseline     NIHSS: 3    Labs:                        8.3    10.0  )-----------( 251      ( 01 Mar 2018 10:46 )             26.6     03-01    143  |  115<H>  |  82<H>  ----------------------------<  114<H>  5.1   |  18<L>  |  2.29<H>    Ca    7.9<L>      01 Mar 2018 10:46    TPro  5.8<L>  /  Alb  1.9<L>  /  TBili  0.2  /  DBili  x   /  AST  58<H>  /  ALT  75  /  AlkPhos  62  03-01    PT/INR - ( 01 Mar 2018 10:46 )   PT: 12.1 sec;   INR: 1.12 ratio       PTT - ( 01 Mar 2018 10:46 )  PTT:32.3 sec    RADIOLOGY:  CT Brain Stroke Protocol (03.01.18 @ 12:28)   The brain demonstrates old lacunar infarction in the caudate nucleus.   Mild periventricular white matter ischemia is noted. No acute cerebral   cortical infarct is seen.  No intracranial hemorrhage is found.  No mass   effect is found in the brain.

## 2018-03-01 NOTE — H&P ADULT - NSHPPHYSICALEXAM_GEN_ALL_CORE
Vital Signs Last 24 Hrs  T(C): 38.2 (01 Mar 2018 11:05), Max: 38.2 (01 Mar 2018 11:05)  T(F): 100.8 (01 Mar 2018 11:05), Max: 100.8 (01 Mar 2018 11:05)  HR: 107 (01 Mar 2018 16:38) (107 - 110)  BP: 135/36 (01 Mar 2018 16:38) (133/63 - 135/36)  RR: 20 (01 Mar 2018 10:24) (20 - 20)  SpO2: 99% (01 Mar 2018 10:24) (99% - 99%)

## 2018-03-01 NOTE — CONSULT NOTE ADULT - ASSESSMENT
Pt is an 83 year old man with extensive past medical history including CKI, HTN, CAD, A-fib on aspirin who presented to the hospital from his assisted living facility c/o left hand weakness. NIHSS 3, no t-PA due to unclear time of last known normal.     #Stroke   -Admit to telemetry   -MRI and MRA head and neck   -Cardiology and Renal consults  -Physical Therapy   -Continue Aspirin 83 year old man with extensive PMH including CKI, HTN, CAD, GIB, A-fib on aspirin presented to the hospital AT 10.24 a.m. from assisted living facility with c/o left hand weakness, he noticed his left hand was not moving properly; staff at the facility noticed he was listing to left side and was unable to push his wheelchair at 7:45 am. Code stroke was called at 11:29. At the time of neuro exam, NIHSS -3, CTH negative for acute stroke or hemorrhage.    # Acute right side infarct; likley lacunar; pt not a candidate for t-PA as there was no clear time of onset, clearly outside of 3 hour window period.     - CT Angio was not done as the patient has known renal disease.    -Admit to telemetry   -MRI and MRA head and neck   -Cardiology and Renal consults  -Physical Therapy   -Continue Aspirin / Lipitor  -DVT prolphylaxis  -Speech/swallow    Neurology attending  ----------------------------  Pt seen and examined.  Agree with above plan    Discussed with Dr. Dykes and pt and his daughter

## 2018-03-02 LAB
ANION GAP SERPL CALC-SCNC: 9 MMOL/L — SIGNIFICANT CHANGE UP (ref 5–17)
ANISOCYTOSIS BLD QL: SLIGHT — SIGNIFICANT CHANGE UP
BASO STIPL BLD QL SMEAR: SLIGHT — SIGNIFICANT CHANGE UP
BUN SERPL-MCNC: 70 MG/DL — HIGH (ref 7–23)
BURR CELLS BLD QL SMEAR: PRESENT — SIGNIFICANT CHANGE UP
CALCIUM SERPL-MCNC: 8.2 MG/DL — LOW (ref 8.5–10.1)
CHLORIDE SERPL-SCNC: 115 MMOL/L — HIGH (ref 96–108)
CHOLEST SERPL-MCNC: 75 MG/DL — SIGNIFICANT CHANGE UP (ref 10–199)
CO2 SERPL-SCNC: 19 MMOL/L — LOW (ref 22–31)
CREAT SERPL-MCNC: 1.85 MG/DL — HIGH (ref 0.5–1.3)
ELLIPTOCYTES BLD QL SMEAR: SLIGHT — SIGNIFICANT CHANGE UP
EOSINOPHIL NFR BLD AUTO: 2 % — SIGNIFICANT CHANGE UP (ref 0–6)
GIANT PLATELETS BLD QL SMEAR: PRESENT — SIGNIFICANT CHANGE UP
GLUCOSE SERPL-MCNC: 130 MG/DL — HIGH (ref 70–99)
HBA1C BLD-MCNC: 5.6 % — SIGNIFICANT CHANGE UP (ref 4–5.6)
HCT VFR BLD CALC: 24.8 % — LOW (ref 39–50)
HDLC SERPL-MCNC: 39 MG/DL — LOW (ref 40–125)
HGB BLD-MCNC: 7.8 G/DL — LOW (ref 13–17)
HYPOCHROMIA BLD QL: SLIGHT — SIGNIFICANT CHANGE UP
LIPID PNL WITH DIRECT LDL SERPL: 22 MG/DL — SIGNIFICANT CHANGE UP
LYMPHOCYTES # BLD AUTO: 4 % — LOW (ref 13–44)
MACROCYTES BLD QL: SLIGHT — SIGNIFICANT CHANGE UP
MAGNESIUM SERPL-MCNC: 1.5 MG/DL — LOW (ref 1.6–2.6)
MANUAL DIF COMMENT BLD-IMP: SIGNIFICANT CHANGE UP
MCHC RBC-ENTMCNC: 29.4 PG — SIGNIFICANT CHANGE UP (ref 27–34)
MCHC RBC-ENTMCNC: 31.6 GM/DL — LOW (ref 32–36)
MCV RBC AUTO: 93 FL — SIGNIFICANT CHANGE UP (ref 80–100)
MICROCYTES BLD QL: SLIGHT — SIGNIFICANT CHANGE UP
MONOCYTES NFR BLD AUTO: 4 % — SIGNIFICANT CHANGE UP (ref 2–14)
NEUTROPHILS NFR BLD AUTO: 90 % — HIGH (ref 43–77)
OB PNL STL: NEGATIVE — SIGNIFICANT CHANGE UP
OVALOCYTES BLD QL SMEAR: SLIGHT — SIGNIFICANT CHANGE UP
PLAT MORPH BLD: NORMAL — SIGNIFICANT CHANGE UP
PLATELET # BLD AUTO: 213 K/UL — SIGNIFICANT CHANGE UP (ref 150–400)
POIKILOCYTOSIS BLD QL AUTO: SLIGHT — SIGNIFICANT CHANGE UP
POLYCHROMASIA BLD QL SMEAR: SLIGHT — SIGNIFICANT CHANGE UP
POTASSIUM SERPL-MCNC: 4.4 MMOL/L — SIGNIFICANT CHANGE UP (ref 3.5–5.3)
POTASSIUM SERPL-SCNC: 4.4 MMOL/L — SIGNIFICANT CHANGE UP (ref 3.5–5.3)
RBC # BLD: 2.67 M/UL — LOW (ref 4.2–5.8)
RBC # FLD: 14.2 % — SIGNIFICANT CHANGE UP (ref 10.3–14.5)
RBC BLD AUTO: (no result)
SCHISTOCYTES BLD QL AUTO: SLIGHT — SIGNIFICANT CHANGE UP
SODIUM SERPL-SCNC: 143 MMOL/L — SIGNIFICANT CHANGE UP (ref 135–145)
TOTAL CHOLESTEROL/HDL RATIO MEASUREMENT: 1.9 RATIO — LOW (ref 3.4–9.6)
TRIGL SERPL-MCNC: 72 MG/DL — SIGNIFICANT CHANGE UP (ref 10–149)
TSH SERPL-MCNC: 0.49 UIU/ML — SIGNIFICANT CHANGE UP (ref 0.36–3.74)
WBC # BLD: 8 K/UL — SIGNIFICANT CHANGE UP (ref 3.8–10.5)
WBC # FLD AUTO: 8 K/UL — SIGNIFICANT CHANGE UP (ref 3.8–10.5)

## 2018-03-02 PROCEDURE — 70544 MR ANGIOGRAPHY HEAD W/O DYE: CPT | Mod: 26,59

## 2018-03-02 PROCEDURE — 93306 TTE W/DOPPLER COMPLETE: CPT | Mod: 26

## 2018-03-02 PROCEDURE — 70551 MRI BRAIN STEM W/O DYE: CPT | Mod: 26

## 2018-03-02 PROCEDURE — 70547 MR ANGIOGRAPHY NECK W/O DYE: CPT | Mod: 26

## 2018-03-02 PROCEDURE — 93010 ELECTROCARDIOGRAM REPORT: CPT

## 2018-03-02 PROCEDURE — 99232 SBSQ HOSP IP/OBS MODERATE 35: CPT

## 2018-03-02 PROCEDURE — 73718 MRI LOWER EXTREMITY W/O DYE: CPT | Mod: 26,LT

## 2018-03-02 RX ORDER — SODIUM CHLORIDE 9 MG/ML
500 INJECTION INTRAMUSCULAR; INTRAVENOUS; SUBCUTANEOUS ONCE
Qty: 0 | Refills: 0 | Status: DISCONTINUED | OUTPATIENT
Start: 2018-03-02 | End: 2018-03-02

## 2018-03-02 RX ORDER — TRAMADOL HYDROCHLORIDE 50 MG/1
25 TABLET ORAL ONCE
Qty: 0 | Refills: 0 | Status: DISCONTINUED | OUTPATIENT
Start: 2018-03-02 | End: 2018-03-02

## 2018-03-02 RX ORDER — MAGNESIUM SULFATE 500 MG/ML
2 VIAL (ML) INJECTION ONCE
Qty: 0 | Refills: 0 | Status: COMPLETED | OUTPATIENT
Start: 2018-03-02 | End: 2018-03-02

## 2018-03-02 RX ORDER — METOPROLOL TARTRATE 50 MG
5 TABLET ORAL ONCE
Qty: 0 | Refills: 0 | Status: COMPLETED | OUTPATIENT
Start: 2018-03-02 | End: 2018-03-02

## 2018-03-02 RX ADMIN — Medication 50 MILLIGRAM(S): at 05:15

## 2018-03-02 RX ADMIN — GABAPENTIN 300 MILLIGRAM(S): 400 CAPSULE ORAL at 05:15

## 2018-03-02 RX ADMIN — Medication 1000 UNIT(S): at 11:38

## 2018-03-02 RX ADMIN — Medication 5 MILLIGRAM(S): at 12:53

## 2018-03-02 RX ADMIN — PANTOPRAZOLE SODIUM 40 MILLIGRAM(S): 20 TABLET, DELAYED RELEASE ORAL at 05:14

## 2018-03-02 RX ADMIN — SIMVASTATIN 10 MILLIGRAM(S): 20 TABLET, FILM COATED ORAL at 22:30

## 2018-03-02 RX ADMIN — AMLODIPINE BESYLATE 5 MILLIGRAM(S): 2.5 TABLET ORAL at 05:51

## 2018-03-02 RX ADMIN — TRAMADOL HYDROCHLORIDE 50 MILLIGRAM(S): 50 TABLET ORAL at 22:30

## 2018-03-02 RX ADMIN — Medication 81 MILLIGRAM(S): at 11:38

## 2018-03-02 RX ADMIN — ISOSORBIDE MONONITRATE 120 MILLIGRAM(S): 60 TABLET, EXTENDED RELEASE ORAL at 11:38

## 2018-03-02 RX ADMIN — Medication 650 MILLIGRAM(S): at 13:20

## 2018-03-02 RX ADMIN — Medication 0.5 MILLIGRAM(S): at 08:13

## 2018-03-02 RX ADMIN — GABAPENTIN 300 MILLIGRAM(S): 400 CAPSULE ORAL at 17:33

## 2018-03-02 RX ADMIN — CEFTRIAXONE 1000 MILLIGRAM(S): 500 INJECTION, POWDER, FOR SOLUTION INTRAMUSCULAR; INTRAVENOUS at 17:35

## 2018-03-02 RX ADMIN — Medication 325 MILLIGRAM(S): at 11:38

## 2018-03-02 RX ADMIN — Medication 8 MILLIGRAM(S): at 22:30

## 2018-03-02 RX ADMIN — HEPARIN SODIUM 5000 UNIT(S): 5000 INJECTION INTRAVENOUS; SUBCUTANEOUS at 13:44

## 2018-03-02 RX ADMIN — HEPARIN SODIUM 5000 UNIT(S): 5000 INJECTION INTRAVENOUS; SUBCUTANEOUS at 22:33

## 2018-03-02 RX ADMIN — Medication 40 MILLIGRAM(S): at 05:14

## 2018-03-02 RX ADMIN — Medication 100 MILLIGRAM(S): at 11:38

## 2018-03-02 RX ADMIN — Medication 25 MILLIGRAM(S): at 05:14

## 2018-03-02 RX ADMIN — Medication 25 MILLIGRAM(S): at 16:22

## 2018-03-02 RX ADMIN — Medication 50 GRAM(S): at 12:02

## 2018-03-02 RX ADMIN — AZITHROMYCIN 255 MILLIGRAM(S): 500 TABLET, FILM COATED ORAL at 17:34

## 2018-03-02 RX ADMIN — TRAMADOL HYDROCHLORIDE 25 MILLIGRAM(S): 50 TABLET ORAL at 11:35

## 2018-03-02 RX ADMIN — LORATADINE 10 MILLIGRAM(S): 10 TABLET ORAL at 11:38

## 2018-03-02 RX ADMIN — TRAMADOL HYDROCHLORIDE 25 MILLIGRAM(S): 50 TABLET ORAL at 02:58

## 2018-03-02 NOTE — PHYSICAL THERAPY INITIAL EVALUATION ADULT - PLANNED THERAPY INTERVENTIONS, PT EVAL
strengthening/stretching/transfer training/wheelchair management/propulsion training/bed mobility training

## 2018-03-02 NOTE — SWALLOW BEDSIDE ASSESSMENT ADULT - NS SPL SWALLOW CLINIC TRIAL FT
Odynophagia and dyspepsia were denied. No behavioral aspiration signs were exhibited on exam. Odynophagia and dyspepsia were denied.

## 2018-03-02 NOTE — PHYSICAL THERAPY INITIAL EVALUATION ADULT - ACTIVE RANGE OF MOTION EXAMINATION, REHAB EVAL
bilateral upper extremity Active ROM was WFL (within functional limits)/bilateral  lower extremity Active ROM was WFL (within functional limits)/except L wrist and hand PROM WFLs. Mild deficit in full knee extension.

## 2018-03-02 NOTE — SWALLOW BEDSIDE ASSESSMENT ADULT - SWALLOW EVAL: CRITERIA FOR SKILLED INTERVENTION MET
no problems identified which require skilled intervention/No overt need for speech or swallowing therapy. At suspected communicative/swallow baseline. Thus, WILL NOT ACTIVELY FOLLOW. RECONSULT PRN

## 2018-03-02 NOTE — PHYSICAL THERAPY INITIAL EVALUATION ADULT - PERTINENT HX OF CURRENT PROBLEM, REHAB EVAL
84 yo M admitted form [facility] with  weakness in LUE and slumping over to his left side.   CT: old lacunar infarction in the caudate nucleus. Mild periventricular white matter ischemia is noted. MRI pending.

## 2018-03-02 NOTE — SWALLOW BEDSIDE ASSESSMENT ADULT - SWALLOW EVAL: RECOMMENDED DIET
SUGGEST A REGULAR TEXTURE DIET WITH THIN LIQUID CONSISTENCIES AS TOLERATED AS HE TOLERATES SAME FROM AN OROPHARYNGEAL SWALLOWING STANCE ON CLINICAL EXAM.

## 2018-03-02 NOTE — PROGRESS NOTE ADULT - ASSESSMENT
82 year old Man with pmhx as above presented to  ED, sent in from [facility] with c/o weakness in LUE and slumping over to his left side which started around 7:45 the morning of his arrival    LUE weakness  - CTH negative   - MRI/MRA head/neck - awaiting result  - neurology following     RUL PNA- on cxr  - he's afebrile / WBC wnl  - con't ceftriaxone and azithromycin    Chronic HFpEF  - appears compensated   - con't lasix / hydralazine / Imdur / asa   - TTE reviewed  - cardio consult input noted    CAD / PAD / DVT  - hx PCI  - chest pain free  -  con't asa / BB / statin   - had IVC filter placed last  encounter    LLE with chronic stasis   - LLE dsg with ace wrap in place  - dsg to be done by vascular (per vascular note)  - pt's dtr reports he uses sulfa alginase    Hypomagnesia - acute  - mg 1.5   - magnesium 2gm IV ordered this am (now infusing)    pAFib  - episode of sinus tach 130's on tele  - metoprolol IV 5mg x1; replete mg to keep mg >2.0  - con't metoprolol 25 mg bid  - not on anticoag d/t recurrent GIB    HTN  - con't above meds  - monitor per protocol    ARYA on CKD stage 3  - has also been following closely with renal Dr. Tomas for ARYA on CKD and for hyperkalemia for which he received Kayexalate one day prior to admission.   -  crt last encounter was 1.92 at discharge on 9/14/17 was 1.48; chart review notable for crt 1.92 in 12/2017  - crt trending down 1.85 (2.29 on adm)  - con't to monitor BMP / renal consult if crt worsens    Anemia  - hx GIB last encounter requiring PRBC's (~3 units)  - downtrend in H/H 8.3--> 7.8 (was 10.1 / 31 in Dec 2017)  - check stool for occult blood  - GI consult (Dr Hernández) if he continues to downtrend or if stool+    Dispo  - full code  - goals of care / disposition d/w patient and Dtr Anamika at bedside

## 2018-03-02 NOTE — CONSULT NOTE ADULT - SUBJECTIVE AND OBJECTIVE BOX
NEPHROLOGY INTERVAL HPI/OVERNIGHT EVENTS:  84 y/o wm with hx of ckd stage 3 ( 1.5-1.7) presents with left sided weakness that started at 7:45 yesterday morning.  In ER evaluated for acute CVA and not candidate for tpa due to time.  Recently with cr upto 2.7 and lasix was dc and with k of 5.9.  given kayexelate one day prior to admission.  Noted with PNA.  Today s/p echo and await MRI/MRA to further evaluate possible CVA.  CT head neg for acute changes.      Has hx of Dieulafoy clipped in past now with variable anemia.  OCB neg upon arrival  Followed by Dr Stephenson of GI.  Scheduled for MRI of LE that is followed by Dr Clement and seeing hyperbaric wound specialist     Appetite fair and tolerating po without any n/v/d.  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,      FAMILY HISTORY:  No pertinent family history in first degree relatives    ALL: NKDA    Home Medications:   * Patient Currently Takes Medications as of 01-Mar-2018 16:13 documented in Structured Notes  · 	acetaminophen 325 mg oral tablet: 2 tab(s) orally every 8 hours, As Needed -for temp>100.5 or body aches/headaches, Last Dose Taken:    · 	hydrALAZINE 50 mg oral tablet: 1 tab(s) orally once a day, Last Dose Taken:    · 	simvastatin 10 mg oral tablet: 1 tab(s) orally once a day (at bedtime), Last Dose Taken:    · 	allopurinol 100 mg oral tablet: 1 tab(s) orally once a day, Last Dose Taken:    · 	metoprolol tartrate 25 mg oral tablet: 1 tab(s) orally 2 times a day, Last Dose Taken:    · 	amLODIPine 5 mg oral tablet: 1 tab(s) orally once a day, Last Dose Taken:    · 	furosemide 40 mg oral tablet: 1 tab(s) orally once a day, Last Dose Taken:    · 	doxazosin 8 mg oral tablet: 1 tab(s) orally once a day (at bedtime), Last Dose Taken:    · 	isosorbide mononitrate 120 mg oral tablet, extended release: 1 tab(s) orally once a day, Last Dose Taken:    · 	gabapentin 300 mg oral capsule: 1 cap(s) orally 2 times a day, Last Dose Taken:    · 	pantoprazole 40 mg oral delayed release tablet: 1 tab(s) orally once a day, Last Dose Taken:    · 	ferrous sulfate 325 mg (65 mg elemental iron) oral delayed release tablet: 1 tab(s) orally once a day, Last Dose Taken:    · 	Vitamin D3 1000 intl units oral capsule: 1 cap(s) orally once a day, Last Dose Taken:    · 	levocetirizine 5 mg oral tablet: 1 tab(s) orally once a day (in the evening), Last Dose Taken:    · 	traMADol 50 mg oral tablet: 1 tab(s) orally once a day (at bedtime), Last Dose Taken:    · 	traMADol 50 mg oral tablet: 0.5 tab(s) orally 2 times a day, As Needed for pain, Last Dose Taken:    · 	aspirin 81 mg oral tablet, chewable: 1 tab(s) orally once a day, Last Dose Taken:    · 	budesonide 0.5 mg/2 mL inhalation suspension: 2 milliliter(s) inhaled 2 times a day, Last Dose Taken:      soc hx; no smoking, no drinking at Assisted living center        MEDICATIONS  (STANDING):  allopurinol 100 milliGRAM(s) Oral daily  amLODIPine   Tablet 5 milliGRAM(s) Oral daily  aspirin enteric coated 81 milliGRAM(s) Oral daily  azithromycin  IVPB 500 milliGRAM(s) IV Intermittent every 24 hours  buDESOnide   0.5 milliGRAM(s) Respule 0.5 milliGRAM(s) Inhalation two times a day  cefTRIAXone Injectable. 1000 milliGRAM(s) IV Push every 24 hours  cholecalciferol 1000 Unit(s) Oral daily  doxazosin 8 milliGRAM(s) Oral at bedtime  ferrous    sulfate 325 milliGRAM(s) Oral daily  furosemide    Tablet 40 milliGRAM(s) Oral daily  gabapentin 300 milliGRAM(s) Oral two times a day  heparin  Injectable 5000 Unit(s) SubCutaneous every 8 hours  hydrALAZINE 50 milliGRAM(s) Oral daily  isosorbide   mononitrate ER Tablet (IMDUR) 120 milliGRAM(s) Oral daily  loratadine 10 milliGRAM(s) Oral daily  metoprolol     tartrate 25 milliGRAM(s) Oral two times a day  pantoprazole    Tablet 40 milliGRAM(s) Oral before breakfast  simvastatin 10 milliGRAM(s) Oral at bedtime  traMADol 50 milliGRAM(s) Oral at bedtime    MEDICATIONS  (PRN):  acetaminophen   Tablet 650 milliGRAM(s) Oral every 6 hours PRN For Temp greater than 38 C (100.4 F)  ondansetron Injectable 4 milliGRAM(s) IV Push every 6 hours PRN Nausea  traMADol 25 milliGRAM(s) Oral two times a day PRN Moderate Pain (4 - 6)      Allergies    Zosyn (Rash)    Intolerances        I&O's Summary    02 Mar 2018 07:01  -  02 Mar 2018 12:54  --------------------------------------------------------  IN: 50 mL / OUT: 0 mL / NET: 50 mL      Vital Signs Last 24 Hrs  T(C): 37.7 (02 Mar 2018 10:20), Max: 37.7 (02 Mar 2018 10:20)  T(F): 99.9 (02 Mar 2018 10:20), Max: 99.9 (02 Mar 2018 10:20)  HR: 140 (02 Mar 2018 12:45) (68 - 140)  BP: 111/34 (02 Mar 2018 12:45) (111/34 - 149/44)  BP(mean): --  RR: 18 (02 Mar 2018 12:45) (17 - 18)  SpO2: 94% (02 Mar 2018 12:45) (93% - 99%)  Daily     Daily Weight in k.6 (02 Mar 2018 05:08)  I&O's Summary    02 Mar 2018 07:01  -  02 Mar 2018 12:54  --------------------------------------------------------  IN: 50 mL / OUT: 0 mL / NET: 50 mL        PHYSICAL EXAM:  GEN: alert awake O X 3  HEENT: MMM  NECK supple no jvd  CV: RRR s1s2  LUNGS: b/l CTA  ABD: +BS soft,   EXT: no edema    LABS:                        7.8    8.0   )-----------( 213      ( 02 Mar 2018 07:22 )             24.8         143  |  115<H>  |  70<H>  ----------------------------<  130<H>  4.4   |  19<L>  |  1.85<H>    Ca    8.2<L>      02 Mar 2018 07:22  Mg     1.5         TPro  5.8<L>  /  Alb  1.9<L>  /  TBili  0.2  /  DBili  x   /  AST  58<H>  /  ALT  75  /  AlkPhos  62  03    PT/INR - ( 01 Mar 2018 10:46 )   PT: 12.1 sec;   INR: 1.12 ratio         PTT - ( 01 Mar 2018 10:46 )  PTT:32.3 sec    Magnesium, Serum: 1.5 mg/dL ( @ 07:22)    EXAM:  CT BRAIN STROKE PROTOCOL                            PROCEDURE DATE:  2018          INTERPRETATION:      CT head without IV contrast        CLINICAL INFORMATION:        Stroke protocol    TECHNIQUE: Contiguous axial 5 mm sections were obtained through the head.   Sagittal and coronal 2-D reformatted images were also obtained.   This   scan was performed using automatic exposure control (radiation dose   reduction software) to obtain a diagnostic image quality scan with   patient dose as low as reasonably achievable.     FINDINGS:   CT dated 16 available for review.    The brain demonstrates old lacunar infarction in the caudate nucleus.   Mild periventricular white matter ischemia is noted. No acute cerebral   cortical infarct is seen.  No intracranial hemorrhage is found.  No mass   effect is found in the brain.      The ventricles, sulci and basal cisterns appear unremarkable.         The orbits are unremarkable.  The paranasal sinuses are significant for   minimal mucosal thickening in the RIGHT maxillary sinus area The nasal   cavity appears intact.  The nasopharynx is symmetric.  The central skull   base, petrous temporal bones and calvarium remain intact.      IMPRESSION:    old lacunar infarction in the caudate nucleus. Mild   periventricular white matter ischemia is noted.    Critical value:  I discussed the finding of this report with Dr. Dykes   at 12:28 PM on 2016.  Critical value policy of the hospital was   followed.  Read back and confirmation of receipt of this communication   was performed.  This verbal communication supplements the text report of   this document.        EXAM:  XR CHEST AP OR PA 1V                            PROCEDURE DATE:  2018          INTERPRETATION:  Portable chest radiograph dated 3/1/2018.    COMPARISON: 17.    CLINICAL INFORMATION: Fever.    FINDINGS:    The airway is midline.  Some infiltration in the medial aspect of the right upper lobe. The   remainder of the lungs are clear.  There is no pleural effusion or pneumothorax.   The cardiac silhouette is normal size.   The bones are normal.     IMPRESSION:  Right upper lobe pneumonia for which clinical correlation is recommended.

## 2018-03-02 NOTE — CONSULT NOTE ADULT - ASSESSMENT
83 year old male with history of CAD s/p stents (LAD, RCA bare metal around 9/16), PVD s/p Right BKA, A.Fib (not on AC), Hypertension, COPD on home O2 2L, SHAYY,  Chronic diastolic CHF, Hyperlipidemia, PVD, Iron deficiency anemia, Chronic back pain, Gout, BPH, CKD III . Hx of GI bleed, RLE and RUE DVTs sent in from [facility] with weakness in LUE.     1. LUE weakness- CTH negative. Neuro following. MRI/MRA head/neck - pt with hemostatic clips in stomach from enteroscopy in September 2017, MR compatible but at lower settings, awaiting info regarding MRI settings prior to ordering MRI. 2D echo pending- echo from 6/17- reviewed. Continue ASA and statin. PT eval pending. Cont tele monitoring.    2. RUL PNA- cont abx as per primary team. Cx pending.     3. LLE with chronic stasis changes and base of heal ulcer- abx. Follow with Dr. Henry as outpt- consult pending.     4. Diastolic HF- appears euvolemic. Cont medical mgmt with BP control. Cr worsening to 2.29 this AM. Will hold on addtional lasix for now in setting of PNA and ARYA.     5. Anemia- Hgb decreased to 8.3 today. H/o GI bleed. Follow H/H and transfuse as needed.     6. CAD s/p PCI- cont medical mgmt with asa/bbl/statin.     7. PAF- not on LTA with Gi bleed hx and anemia.     8. HTN- well controlled at present. Cont current meds.     9. DVT proph, replete lytes as needed.

## 2018-03-02 NOTE — PROGRESS NOTE ADULT - ASSESSMENT
# Acute right side infarct; kevyn lacunar; pt not a candidate for t-PA as there was no clear time of onset, clearly outside of 3 hour window period.   Left HP; UE > LE; pts leg weakness has resolved; has left wrist / hand weakness    - await MRI and MRA head and neck   - Physical Therapy   - Continue Aspirin / Lipitor  - DVT prolphylaxis  - Speech/swallow  - Echo    D/W pt and his daughter

## 2018-03-02 NOTE — SWALLOW BEDSIDE ASSESSMENT ADULT - SLP GENERAL OBSERVATIONS
Pt seen at bedside. On encounter, LUE weakness was evident.  Pt was alert and interactive but irritable at times. He was able to verbalize during communicative probes and in conversation. Verbalizations were intelligible, fluent, linguistically intact and contextually appropriate. No underlying dysarthria, verbal apraxia or aphasia were evident on exam. At reported communicative baseline.  Note that pt denied Pt seen at bedside. On encounter, LUE weakness was evident.  Pt was alert and interactive but irritable at times. He was able to verbalize during communicative probes and in conversation. Verbalizations were intelligible, fluent, linguistically intact and contextually appropriate. No underlying dysarthria, verbal apraxia or aphasia were evident on exam. At reported communicative baseline.  Note that pt denied Dysphagia.

## 2018-03-02 NOTE — CONSULT NOTE ADULT - SUBJECTIVE AND OBJECTIVE BOX
Cardiology Consultation    HPI: 83 year old male with history of CAD s/p stents (LAD, RCA bare metal around 9/16), PVD s/p Right BKA, A.Fib (not on AC), Hypertension, COPD on home O2 2L, SHAYY,  Chronic diastolic CHF, Hyperlipidemia, PVD, Iron deficiency anemia, Chronic back pain, Gout, BPH, CKD III . Hx of GI bleed, RLE and RUE DVTs sent in from [facility] assisted [facility] with weakness in LUE and slumping over to his left side which started around 745 this morning. He arrived to the ED around 1030. Code stroke was called and CT head negative for acute CVA. Pt not a candidate for TPA. Currently pt resting comfortably. Complains of chronic back pain. Continues to have weakness in LUE. No headaches, changes in vision, dizziness, chest pain, palpitations, SOB, abd pain, N/V, diarrhea, cough. Pt has been following Dr. Mccracken for LLE cellulitis and was recently taken off abx, he saw a wound care specialist and is pending an MRI of the left foot to r/o OM. He has also been following closely with renal Dr. Tomas for ARYA on CKD and for hyperkalemia for which he received Kayexalate one day prior to admission.  Pt also noted dark stools secondary to iron supplements and was scheduled to follow up with GI. (01 Mar 2018 16:33)    3/2-     PAST MEDICAL & SURGICAL HISTORY:  Diastolic CHF  Peripheral vascular disease  Afib  Anemia  CKD (chronic kidney disease)  COPD (chronic obstructive pulmonary disease)  SHAYY (obstructive sleep apnea)  Sepsis, due to unspecified organism: 2/2 poorly healing wounds b/l  Dyspepsia: On moderate exertion.  Sleep apnea, obstructive: Requires home 02 therapy, and treatment with BIPAP  Atelectasis  Pleural effusion, bilateral  Respiratory failure  Peripheral edema  CRI (chronic renal insufficiency)  Gout  Benign prostatic hypertrophy  Spinal stenosis  Hypercholesterolemia  GERD (gastroesophageal reflux disease)  CAD (coronary artery disease)  Hypertension  S/P angioplasty with stent  Cataract of left eye  Prostate: Surgery green light procedure.  S/P rotator cuff surgery: Right  S/P angioplasty    Allergies  Zosyn (Rash)    SOCIAL HISTORY: Denies tobacco, etoh abuse or illicit drug use    FAMILY HISTORY: No pertinent family history in first degree relatives    MEDICATIONS  (STANDING):  allopurinol 100 milliGRAM(s) Oral daily  amLODIPine   Tablet 5 milliGRAM(s) Oral daily  aspirin enteric coated 81 milliGRAM(s) Oral daily  azithromycin  IVPB 500 milliGRAM(s) IV Intermittent every 24 hours  buDESOnide   0.5 milliGRAM(s) Respule 0.5 milliGRAM(s) Inhalation two times a day  cefTRIAXone Injectable. 1000 milliGRAM(s) IV Push every 24 hours  cholecalciferol 1000 Unit(s) Oral daily  doxazosin 8 milliGRAM(s) Oral at bedtime  ferrous    sulfate 325 milliGRAM(s) Oral daily  furosemide    Tablet 40 milliGRAM(s) Oral daily  gabapentin 300 milliGRAM(s) Oral two times a day  heparin  Injectable 5000 Unit(s) SubCutaneous every 8 hours  hydrALAZINE 50 milliGRAM(s) Oral daily  isosorbide   mononitrate ER Tablet (IMDUR) 120 milliGRAM(s) Oral daily  loratadine 10 milliGRAM(s) Oral daily  metoprolol     tartrate 25 milliGRAM(s) Oral two times a day  pantoprazole    Tablet 40 milliGRAM(s) Oral before breakfast  simvastatin 10 milliGRAM(s) Oral at bedtime  traMADol 50 milliGRAM(s) Oral at bedtime    MEDICATIONS  (PRN):  acetaminophen   Tablet 650 milliGRAM(s) Oral every 6 hours PRN For Temp greater than 38 C (100.4 F)  ondansetron Injectable 4 milliGRAM(s) IV Push every 6 hours PRN Nausea  traMADol 25 milliGRAM(s) Oral two times a day PRN Moderate Pain (4 - 6)      Vital Signs Last 24 Hrs  T(C): 36.4 (02 Mar 2018 05:08), Max: 38.2 (01 Mar 2018 11:05)  T(F): 97.5 (02 Mar 2018 05:08), Max: 100.8 (01 Mar 2018 11:05)  HR: 118 (02 Mar 2018 05:08) (101 - 118)  BP: 131/45 (02 Mar 2018 05:08) (123/39 - 135/36)  BP(mean): --  RR: 18 (02 Mar 2018 05:08) (18 - 20)  SpO2: 96% (02 Mar 2018 05:08) (96% - 99%)    REVIEW OF SYSTEMS:    CONSTITUTIONAL:  As per HPI.  HEENT:  Eyes:  No diplopia or blurred vision. ENT:  No earache, sore throat or runny nose.  CARDIOVASCULAR:  No pressure, squeezing, strangling, tightness, heaviness or aching about the chest, neck, axilla or epigastrium.  RESPIRATORY:  No cough, shortness of breath, PND or orthopnea.  GASTROINTESTINAL:  No nausea, vomiting or diarrhea.  GENITOURINARY:  No dysuria, frequency or urgency.  MUSCULOSKELETAL:  As per HPI.  SKIN:  No change in skin, hair or nails.  NEUROLOGIC:  No paresthesias, fasciculations, seizures or weakness.    PHYSICAL EXAMINATION:    GENERAL APPEARANCE:  Pt. is not currently dyspneic, in no distress. Pt. is alert, oriented, and pleasant.  HEENT:  Pupils are normal and react normally. No icterus. Mucous membranes well colored.  NECK:  Supple. No lymphadenopathy. Jugular venous pressure not elevated. Carotids equal.   HEART:   The cardiac impulse has a normal quality. There are no murmurs, rubs or gallops noted  CHEST:  Chest is clear to auscultation. Normal respiratory effort.  ABDOMEN:  Soft and nontender.   EXTREMITIES:  There is no edema.     I&O's Summary      LABS:                        8.3    10.0  )-----------( 251      ( 01 Mar 2018 10:46 )             26.6     03-01    143  |  115<H>  |  82<H>  ----------------------------<  114<H>  5.1   |  18<L>  |  2.29<H>    Ca    7.9<L>      01 Mar 2018 10:46    TPro  5.8<L>  /  Alb  1.9<L>  /  TBili  0.2  /  DBili  x   /  AST  58<H>  /  ALT  75  /  AlkPhos  62  03-01    LIVER FUNCTIONS - ( 01 Mar 2018 10:46 )  Alb: 1.9 g/dL / Pro: 5.8 gm/dL / ALK PHOS: 62 U/L / ALT: 75 U/L / AST: 58 U/L / GGT: x           PT/INR - ( 01 Mar 2018 10:46 )   PT: 12.1 sec;   INR: 1.12 ratio         PTT - ( 01 Mar 2018 10:46 )  PTT:32.3 sec  CARDIAC MARKERS ( 01 Mar 2018 13:37 )  0.022 ng/mL / x     / x     / x     / x      CARDIAC MARKERS ( 01 Mar 2018 10:46 )  0.021 ng/mL / x     / x     / x     / x        EKG:   < from: 12 Lead ECG (03.01.18 @ 10:32) >  Sinus tachycardia  Otherwise normal ECG    < end of copied text >    TELEMETRY: SR    CARDIAC TESTS:   < from: Transthoracic Echocardiogram (06.19.17 @ 14:13) >  Fibrocalcific changes noted to the mitral valve leaflets with preserved   leaflet excursion.   Moderate (2+) mitral regurgitation is present.   Mild mitral annular calcification is present.   Fibrocalcific changes noted to the aortic valveleaflets with preserved   excursion.   Normal tricuspid valve structure and function.   Mild (1+) tricuspid valve regurgitation is present.   Normal pulmonic valve structure and function.   Trace to Mild pulmonic valvular regurgitation is present.   The left atrium appears mildly dilated.   The left ventricle is normal in size, wall motion and contractility.   Mild concentric left ventricular hypertrophy is present.   Estimated left ventricular ejection fraction is 55-60%.   Normal right atrium.   Normal right ventricle structure and function.   No evidence of pericardial effusion.   No evidence of pleural effusion.    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 Mild   periventricular white matter ischemia is noted.    < end of copied text >  	    ASSESSMENT & PLAN:

## 2018-03-02 NOTE — CONSULT NOTE ADULT - SUBJECTIVE AND OBJECTIVE BOX
Consult Note:   · Provider Specialty	Cardiology	    Referral/Consultation:   Initial Consult:  · Requested by Name:	Dr. Flynn	  · Date/Time:	02-Mar-2018 07:53	  · Reason for Referral/Consultation:	SOB	      · Subjective and Objective: 	  Cardiology Consultation    HPI: 83 year old male with history of CAD s/p stents (LAD, RCA bare metal around 9/16), PVD s/p Right BKA, A.Fib (not on AC), Hypertension, COPD on home O2 2L, SHAYY,  Chronic diastolic CHF, Hyperlipidemia, PVD, Iron deficiency anemia, Chronic back pain, Gout, BPH, CKD III . Hx of GI bleed, RLE and RUE DVTs sent in from [facility] with weakness in LUE and slumping over to his left side which started around 745 this morning. He arrived to the ED around 1030. Code stroke was called and CT head negative for acute CVA. Pt not a candidate for TPA. Currently pt resting comfortably. Complains of chronic back pain. Continues to have weakness in LUE. No headaches, changes in vision, dizziness, chest pain, palpitations, SOB, abd pain, N/V, diarrhea, cough. Pt has been following Dr. Mccracken for LLE cellulitis and was recently taken off abx, he saw a wound care specialist and is pending an MRI of the left foot to r/o OM. He has also been following closely with renal Dr. Tomas for ARYA on CKD and for hyperkalemia for which he received Kayexalate one day prior to admission.  Pt also noted dark stools secondary to iron supplements and was scheduled to follow up with GI. (01 Mar 2018 16:33)    History as above; Pt with chronic L calcaneal ulcer and stasis changes last seen in office ~ 2 months ago    3/2-     PAST MEDICAL & SURGICAL HISTORY:  Diastolic CHF  Peripheral vascular disease  Afib  Anemia  CKD (chronic kidney disease)  COPD (chronic obstructive pulmonary disease)  SHAYY (obstructive sleep apnea)  Sepsis, due to unspecified organism: 2/2 poorly healing wounds b/l  Dyspepsia: On moderate exertion.  Sleep apnea, obstructive: Requires home 02 therapy, and treatment with BIPAP  Atelectasis  Pleural effusion, bilateral  Respiratory failure  Peripheral edema  CRI (chronic renal insufficiency)  Gout  Benign prostatic hypertrophy  Spinal stenosis  Hypercholesterolemia  GERD (gastroesophageal reflux disease)  CAD (coronary artery disease)  Hypertension  S/P angioplasty with stent  Cataract of left eye  Prostate: Surgery green light procedure.  S/P rotator cuff surgery: Right  S/P angioplasty    Allergies  Zosyn (Rash)    SOCIAL HISTORY: Denies tobacco, etoh abuse or illicit drug use    FAMILY HISTORY: No pertinent family history in first degree relatives    MEDICATIONS  (STANDING):  allopurinol 100 milliGRAM(s) Oral daily  amLODIPine   Tablet 5 milliGRAM(s) Oral daily  aspirin enteric coated 81 milliGRAM(s) Oral daily  azithromycin  IVPB 500 milliGRAM(s) IV Intermittent every 24 hours  buDESOnide   0.5 milliGRAM(s) Respule 0.5 milliGRAM(s) Inhalation two times a day  cefTRIAXone Injectable. 1000 milliGRAM(s) IV Push every 24 hours  cholecalciferol 1000 Unit(s) Oral daily  doxazosin 8 milliGRAM(s) Oral at bedtime  ferrous    sulfate 325 milliGRAM(s) Oral daily  furosemide    Tablet 40 milliGRAM(s) Oral daily  gabapentin 300 milliGRAM(s) Oral two times a day  heparin  Injectable 5000 Unit(s) SubCutaneous every 8 hours  hydrALAZINE 50 milliGRAM(s) Oral daily  isosorbide   mononitrate ER Tablet (IMDUR) 120 milliGRAM(s) Oral daily  loratadine 10 milliGRAM(s) Oral daily  metoprolol     tartrate 25 milliGRAM(s) Oral two times a day  pantoprazole    Tablet 40 milliGRAM(s) Oral before breakfast  simvastatin 10 milliGRAM(s) Oral at bedtime  traMADol 50 milliGRAM(s) Oral at bedtime    MEDICATIONS  (PRN):  acetaminophen   Tablet 650 milliGRAM(s) Oral every 6 hours PRN For Temp greater than 38 C (100.4 F)  ondansetron Injectable 4 milliGRAM(s) IV Push every 6 hours PRN Nausea  traMADol 25 milliGRAM(s) Oral two times a day PRN Moderate Pain (4 - 6)      Vital Signs Last 24 Hrs  T(C): 36.4 (02 Mar 2018 05:08), Max: 38.2 (01 Mar 2018 11:05)  T(F): 97.5 (02 Mar 2018 05:08), Max: 100.8 (01 Mar 2018 11:05)  HR: 118 (02 Mar 2018 05:08) (101 - 118)  BP: 131/45 (02 Mar 2018 05:08) (123/39 - 135/36)  BP(mean): --  RR: 18 (02 Mar 2018 05:08) (18 - 20)  SpO2: 96% (02 Mar 2018 05:08) (96% - 99%)    REVIEW OF SYSTEMS:    CONSTITUTIONAL:  As per HPI.  HEENT:  Eyes:  No diplopia or blurred vision. ENT:  No earache, sore throat or runny nose.  CARDIOVASCULAR:  No pressure, squeezing, strangling, tightness, heaviness or aching about the chest, neck, axilla or epigastrium.  RESPIRATORY:  No cough, shortness of breath, PND or orthopnea.  GASTROINTESTINAL:  No nausea, vomiting or diarrhea.  GENITOURINARY:  No dysuria, frequency or urgency.  MUSCULOSKELETAL:  As per HPI.  SKIN:  No change in skin, hair or nails.  NEUROLOGIC:  No paresthesias, fasciculations, seizures or weakness.    PHYSICAL EXAMINATION:    GENERAL APPEARANCE:  Pt. is not currently dyspneic, in no distress. Pt. is alert, oriented, and pleasant.  HEENT:  Pupils are normal and react normally. No icterus. Mucous membranes well colored.  NECK:  Supple. No lymphadenopathy. Jugular venous pressure not elevated. Carotids equal.   HEART:   The cardiac impulse has a normal quality. There are no murmurs, rubs or gallops noted  CHEST:  Chest is clear to auscultation. Normal respiratory effort.  ABDOMEN:  Soft and nontender.   EXTREMITIES:  R AKA; L leg dressed    I&O's Summary      LABS:                        8.3    10.0  )-----------( 251      ( 01 Mar 2018 10:46 )             26.6     03-01    143  |  115<H>  |  82<H>  ----------------------------<  114<H>  5.1   |  18<L>  |  2.29<H>    Ca    7.9<L>      01 Mar 2018 10:46    TPro  5.8<L>  /  Alb  1.9<L>  /  TBili  0.2  /  DBili  x   /  AST  58<H>  /  ALT  75  /  AlkPhos  62  03-01    LIVER FUNCTIONS - ( 01 Mar 2018 10:46 )  Alb: 1.9 g/dL / Pro: 5.8 gm/dL / ALK PHOS: 62 U/L / ALT: 75 U/L / AST: 58 U/L / GGT: x           PT/INR - ( 01 Mar 2018 10:46 )   PT: 12.1 sec;   INR: 1.12 ratio         PTT - ( 01 Mar 2018 10:46 )  PTT:32.3 sec  CARDIAC MARKERS ( 01 Mar 2018 13:37 )  0.022 ng/mL / x     / x     / x     / x      CARDIAC MARKERS ( 01 Mar 2018 10:46 )  0.021 ng/mL / x     / x     / x     / x        EKG:   < from: 12 Lead ECG (03.01.18 @ 10:32) >  Sinus tachycardia  Otherwise normal ECG    < end of copied text >    TELEMETRY: SR    CARDIAC TESTS:   < from: Transthoracic Echocardiogram (06.19.17 @ 14:13) >  Fibrocalcific changes noted to the mitral valve leaflets with preserved   leaflet excursion.   Moderate (2+) mitral regurgitation is present.   Mild mitral annular calcification is present.   Fibrocalcific changes noted to the aortic valveleaflets with preserved   excursion.   Normal tricuspid valve structure and function.   Mild (1+) tricuspid valve regurgitation is present.   Normal pulmonic valve structure and function.   Trace to Mild pulmonic valvular regurgitation is present.   The left atrium appears mildly dilated.   The left ventricle is normal in size, wall motion and contractility.   Mild concentric left ventricular hypertrophy is present.   Estimated left ventricular ejection fraction is 55-60%.   Normal right atrium.   Normal right ventricle structure and function.   No evidence of pericardial effusion.   No evidence of pleural effusion.    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 Mild   periventricular white matter ischemia is noted.    < end of copied text >  	    ASSESSMENT & PLAN:    Assessment and Recommendation:   · Assessment		  83 year old male with history of CAD s/p stents (LAD, RCA bare metal around 9/16), PVD s/p Right BKA, A.Fib (not on AC), Hypertension, COPD on home O2 2L, SHAYY,  Chronic diastolic CHF, Hyperlipidemia, PVD, Iron deficiency anemia, Chronic back pain, Gout, BPH, CKD III . Hx of GI bleed, RLE and RUE DVTs sent in from [facility] with weakness in LUE.     1. LUE weakness- CTH negative. Neuro following. MRI/MRA head/neck - pt with hemostatic clips in stomach from enteroscopy in September 2017, MR compatible but at lower settings, awaiting info regarding MRI settings prior to ordering MRI. 2D echo pending- echo from 6/17- reviewed. Continue ASA and statin. PT eval pending. Cont tele monitoring.    2. RUL PNA- cont abx as per primary team. Cx pending.     3. LLE with chronic stasis changes and base of heal ulcer- abx. will take down dressing tomorrow and make wound care recommendations    4. Diastolic HF- appears euvolemic. Cont medical mgmt with BP control. Cr worsening to 2.29 this AM. Will hold on addtional lasix for now in setting of PNA and ARYA.     5. Anemia- Hgb decreased to 8.3 today. H/o GI bleed. Follow H/H and transfuse as needed.     6. CAD s/p PCI- cont medical mgmt with asa/bbl/statin.     7. PAF- not on LTA with Gi bleed hx and anemia.     8. HTN- well controlled at present. Cont current meds.     9. DVT proph, replete lytes as needed.

## 2018-03-02 NOTE — PROGRESS NOTE ADULT - SUBJECTIVE AND OBJECTIVE BOX
pt with sinus tach 120's - 130's on tele with poor response to IV 5mg metoprolol  He continues to deny CP/palpitations/HA/dizziness    Vital Signs Last 24 Hrs  T(C): 37.7 (02 Mar 2018 10:20), Max: 37.7 (02 Mar 2018 10:20)  T(F): 99.9 (02 Mar 2018 10:20), Max: 99.9 (02 Mar 2018 10:20)  HR: 131 (02 Mar 2018 14:44) (68 - 140)  BP: 117/38 (02 Mar 2018 14:44) (111/34 - 149/44)  BP(mean): --  RR: 18 (02 Mar 2018 12:45) (17 - 18)  SpO2: 94% (02 Mar 2018 12:45) (93% - 99%)    Brief Exam  General: supine in bed, MELISA  Neuro: A&Ox3  CV: regular rate, tachy, no m/c/r  Resp: even and unlabored    A/P  pt presented with c/o weakness, found to have PNA on CXR, now with sustained sinus Tach in the setting of low H/H  Suspect pt has symptomatic anemi  -l transfuse 1 unit PRBC's  - dtr at bedside update on plan

## 2018-03-02 NOTE — PROGRESS NOTE ADULT - SUBJECTIVE AND OBJECTIVE BOX
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 Pt presented with c/o weakness        Review of system- Rest of the review of system are negative except mentioned in HPI    VITALS:  Vital Signs Last 24 Hrs  T(C): 37.7 (02 Mar 2018 10:20), Max: 37.7 (02 Mar 2018 10:20)  T(F): 99.9 (02 Mar 2018 10:20), Max: 99.9 (02 Mar 2018 10:20)  HR: 140 (02 Mar 2018 12:45) (68 - 140)  BP: 111/34 (02 Mar 2018 12:45) (111/34 - 149/44)  BP(mean): --  RR: 18 (02 Mar 2018 12:45) (17 - 18)  SpO2: 94% (02 Mar 2018 12:45) (93% - 99%)      02 Mar 2018 07:01  -  02 Mar 2018 13:00  --------------------------------------------------------  IN:    IV PiggyBack: 50 mL  Total IN: 50 mL    OUT:  Total OUT: 0 mL    Total NET: 50 mL      PHYSICAL EXAM:  HEENT:  pupils equal and reactive, EOMI, tongue midline, facial symmetry, no oropharyngeal lesions, erythema, exudates, oral thrush  NECK:   supple, no carotid bruits, no palpable lymph nodes, no thyromegaly  CV:  +S1, +S2, regular/ tachy  RESP:   lungs clear to auscultation bilaterally, no wheezing, rales, rhonchi, good air entry bilaterally  GI:  abdomen soft, non-tender, non-distended, normal BS, no bruits, no abdominal masses, no palpable masses  MSK:   normal muscle tone, no atrophy, no rigidity, no contractions  EXT:   no clubbing, no cyanosis, no edema, no calf pain, swelling or erythema  VASCULAR:  pulses equal and symmetric in the upper and lower extremities  NEURO/MSK  AAOX3, follows all commands, able to move extremities spontaneously; L wrist drop,  SKIN:  no ulcers, lesions or rashes    LABS: all labs reviewed                          7.8    8.0   )-----------( 213      ( 02 Mar 2018 07:22 )             24.8     03-02    143  |  115<H>  |  70<H>  ----------------------------<  130<H>  4.4   |  19<L>  |  1.85<H>    Ca    8.2<L>      02 Mar 2018 07:22  Mg     1.5     03-02    TPro  5.8<L>  /  Alb  1.9<L>  /  TBili  0.2  /  DBili  x   /  AST  58<H>  /  ALT  75  /  AlkPhos  62  03-01    CARDIAC MARKERS ( 01 Mar 2018 13:37 )  0.022 ng/mL / x     / x     / x     / x      CARDIAC MARKERS ( 01 Mar 2018 10:46 )  0.021 ng/mL / x     / x     / x     / x        LIVER FUNCTIONS - ( 01 Mar 2018 10:46 )  Alb: 1.9 g/dL / Pro: 5.8 gm/dL / ALK PHOS: 62 U/L / ALT: 75 U/L / AST: 58 U/L / GGT: x           PT/INR - ( 01 Mar 2018 10:46 )   PT: 12.1 sec;   INR: 1.12 ratio      PTT - ( 01 Mar 2018 10:46 )  PTT:32.3 sec    Lactate, Blood: 0.7 mmol/L (03-01 @ 13:37)      MEDICATIONS  (STANDING):  allopurinol 100 milliGRAM(s) Oral daily  amLODIPine   Tablet 5 milliGRAM(s) Oral daily  aspirin enteric coated 81 milliGRAM(s) Oral daily  azithromycin  IVPB 500 milliGRAM(s) IV Intermittent every 24 hours  buDESOnide   0.5 milliGRAM(s) Respule 0.5 milliGRAM(s) Inhalation two times a day  cefTRIAXone Injectable. 1000 milliGRAM(s) IV Push every 24 hours  cholecalciferol 1000 Unit(s) Oral daily  doxazosin 8 milliGRAM(s) Oral at bedtime  ferrous    sulfate 325 milliGRAM(s) Oral daily  furosemide    Tablet 40 milliGRAM(s) Oral daily  gabapentin 300 milliGRAM(s) Oral two times a day  heparin  Injectable 5000 Unit(s) SubCutaneous every 8 hours  hydrALAZINE 50 milliGRAM(s) Oral daily  isosorbide   mononitrate ER Tablet (IMDUR) 120 milliGRAM(s) Oral daily  loratadine 10 milliGRAM(s) Oral daily  metoprolol     tartrate 25 milliGRAM(s) Oral two times a day  pantoprazole    Tablet 40 milliGRAM(s) Oral before breakfast  simvastatin 10 milliGRAM(s) Oral at bedtime  traMADol 50 milliGRAM(s) Oral at bedtime    MEDICATIONS  (PRN):  acetaminophen   Tablet 650 milliGRAM(s) Oral every 6 hours PRN For Temp greater than 38 C (100.4 F)  ondansetron Injectable 4 milliGRAM(s) IV Push every 6 hours PRN Nausea  traMADol 25 milliGRAM(s) Oral two times a day PRN Moderate Pain (4 - 6)    < from: Transthoracic Echocardiogram (03.02.18 @ 11:10) >  Summary     Mild septal left ventricular hypertrophy is present. Left ventricular   wall  motion is normal.The left ventricle is normal in size. Estimated left   ventricular ejection fraction is 60 %.   The aortic valve is well visualized, appears normal. Valve opening seems   to be normal. No aortic regurgitation is present.   The mitral valve was well visualized. The mitral valve leaflets appear   thin and normal. Mild to moderate mitral regurgitation is present.      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 Mild   periventricular white matter ischemia is noted.    < end of copied text > HPI: 82 year old male with history of CAD s/p stents (LAD, RCA bare metal around 9/16),PVD (RLE stent/ angioplasty and surgical debridement by Dr Siu 5/20, right lower extremity amputation ) AFib  on coumadin, HTN, COPD on home O2 2L, SHAYY on history of  nocturnal BIPAP, ex-smoker (smoked 1ppd X 50 years, quit 22 years ago),  Chronic HFpEF, dyslipidemia, PVD, Iron deficiency anemia, Chronic back pain, Gout, BPH, CKD III, GIB, 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 Pt presented with c/o weakness        Review of system- Rest of the review of system are negative except mentioned in HPI    VITALS:  Vital Signs Last 24 Hrs  T(C): 37.7 (02 Mar 2018 10:20), Max: 37.7 (02 Mar 2018 10:20)  T(F): 99.9 (02 Mar 2018 10:20), Max: 99.9 (02 Mar 2018 10:20)  HR: 140 (02 Mar 2018 12:45) (68 - 140)  BP: 111/34 (02 Mar 2018 12:45) (111/34 - 149/44)  BP(mean): --  RR: 18 (02 Mar 2018 12:45) (17 - 18)  SpO2: 94% (02 Mar 2018 12:45) (93% - 99%)      02 Mar 2018 07:01  -  02 Mar 2018 13:00  --------------------------------------------------------  IN:    IV PiggyBack: 50 mL  Total IN: 50 mL    OUT:  Total OUT: 0 mL    Total NET: 50 mL      PHYSICAL EXAM:  HEENT:  pupils equal and reactive, EOMI, tongue midline, facial symmetry, no oropharyngeal lesions, erythema, exudates, oral thrush  NECK:   supple, no carotid bruits, no palpable lymph nodes, no thyromegaly  CV:  +S1, +S2, regular/ tachy  RESP:   lungs clear to auscultation bilaterally, no wheezing, rales, rhonchi, good air entry bilaterally  GI:  abdomen soft, non-tender, non-distended, normal BS, no bruits, no abdominal masses, no palpable masses  MSK:   normal muscle tone, no atrophy, no rigidity, no contractions  EXT:   no clubbing, no cyanosis, no edema, no calf pain, swelling or erythema  VASCULAR:  +radial pulses; R BKA, LLE with ace wrap dressing  NEURO/MSK  AAOX3, follows all commands, able to move extremities spontaneously; L wrist drop,  SKIN:  no ulcers, lesions or rashes    LABS: all labs reviewed                          7.8    8.0   )-----------( 213      ( 02 Mar 2018 07:22 )             24.8     03-02    143  |  115<H>  |  70<H>  ----------------------------<  130<H>  4.4   |  19<L>  |  1.85<H>    Ca    8.2<L>      02 Mar 2018 07:22  Mg     1.5     03-02    TPro  5.8<L>  /  Alb  1.9<L>  /  TBili  0.2  /  DBili  x   /  AST  58<H>  /  ALT  75  /  AlkPhos  62  03-01    CARDIAC MARKERS ( 01 Mar 2018 13:37 )  0.022 ng/mL / x     / x     / x     / x      CARDIAC MARKERS ( 01 Mar 2018 10:46 )  0.021 ng/mL / x     / x     / x     / x        LIVER FUNCTIONS - ( 01 Mar 2018 10:46 )  Alb: 1.9 g/dL / Pro: 5.8 gm/dL / ALK PHOS: 62 U/L / ALT: 75 U/L / AST: 58 U/L / GGT: x           PT/INR - ( 01 Mar 2018 10:46 )   PT: 12.1 sec;   INR: 1.12 ratio      PTT - ( 01 Mar 2018 10:46 )  PTT:32.3 sec    Lactate, Blood: 0.7 mmol/L (03-01 @ 13:37)      MEDICATIONS  (STANDING):  allopurinol 100 milliGRAM(s) Oral daily  amLODIPine   Tablet 5 milliGRAM(s) Oral daily  aspirin enteric coated 81 milliGRAM(s) Oral daily  azithromycin  IVPB 500 milliGRAM(s) IV Intermittent every 24 hours  buDESOnide   0.5 milliGRAM(s) Respule 0.5 milliGRAM(s) Inhalation two times a day  cefTRIAXone Injectable. 1000 milliGRAM(s) IV Push every 24 hours  cholecalciferol 1000 Unit(s) Oral daily  doxazosin 8 milliGRAM(s) Oral at bedtime  ferrous    sulfate 325 milliGRAM(s) Oral daily  furosemide    Tablet 40 milliGRAM(s) Oral daily  gabapentin 300 milliGRAM(s) Oral two times a day  heparin  Injectable 5000 Unit(s) SubCutaneous every 8 hours  hydrALAZINE 50 milliGRAM(s) Oral daily  isosorbide   mononitrate ER Tablet (IMDUR) 120 milliGRAM(s) Oral daily  loratadine 10 milliGRAM(s) Oral daily  metoprolol     tartrate 25 milliGRAM(s) Oral two times a day  pantoprazole    Tablet 40 milliGRAM(s) Oral before breakfast  simvastatin 10 milliGRAM(s) Oral at bedtime  traMADol 50 milliGRAM(s) Oral at bedtime    MEDICATIONS  (PRN):  acetaminophen   Tablet 650 milliGRAM(s) Oral every 6 hours PRN For Temp greater than 38 C (100.4 F)  ondansetron Injectable 4 milliGRAM(s) IV Push every 6 hours PRN Nausea  traMADol 25 milliGRAM(s) Oral two times a day PRN Moderate Pain (4 - 6)    < from: Transthoracic Echocardiogram (03.02.18 @ 11:10) >  Summary     Mild septal left ventricular hypertrophy is present. Left ventricular   wall  motion is normal.The left ventricle is normal in size. Estimated left   ventricular ejection fraction is 60 %.   The aortic valve is well visualized, appears normal. Valve opening seems   to be normal. No aortic regurgitation is present.   The mitral valve was well visualized. The mitral valve leaflets appear   thin and normal. Mild to moderate mitral regurgitation is present.      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 Mild   periventricular white matter ischemia is noted.    < end of copied text >

## 2018-03-02 NOTE — PHYSICAL THERAPY INITIAL EVALUATION ADULT - GENERAL OBSERVATIONS, REHAB EVAL
Patient received in bed on 3E, asleep, easily aroused , but somnolent.  Daughter at bedside to give history. +HM, +O2@2L via nc. L LE in Emilee Boot. RONYFO boot at bedside for WBg.

## 2018-03-02 NOTE — PROGRESS NOTE ADULT - SUBJECTIVE AND OBJECTIVE BOX
S&O:  Pt feels better, left arm weakness mildly improved, has left wrist drop, moves left leg antigravity, no dysarthria or numbness    Seen by vascular, cleared to have MRI    MEDICATIONS  (STANDING):  allopurinol 100 milliGRAM(s) Oral daily  amLODIPine   Tablet 5 milliGRAM(s) Oral daily  aspirin enteric coated 81 milliGRAM(s) Oral daily  azithromycin  IVPB 500 milliGRAM(s) IV Intermittent every 24 hours  buDESOnide   0.5 milliGRAM(s) Respule 0.5 milliGRAM(s) Inhalation two times a day  cefTRIAXone Injectable. 1000 milliGRAM(s) IV Push every 24 hours  cholecalciferol 1000 Unit(s) Oral daily  doxazosin 8 milliGRAM(s) Oral at bedtime  ferrous    sulfate 325 milliGRAM(s) Oral daily  furosemide    Tablet 40 milliGRAM(s) Oral daily  gabapentin 300 milliGRAM(s) Oral two times a day  heparin  Injectable 5000 Unit(s) SubCutaneous every 8 hours  hydrALAZINE 50 milliGRAM(s) Oral daily  isosorbide   mononitrate ER Tablet (IMDUR) 120 milliGRAM(s) Oral daily  loratadine 10 milliGRAM(s) Oral daily  magnesium sulfate  IVPB 2 Gram(s) IV Intermittent once  metoprolol     tartrate 25 milliGRAM(s) Oral two times a day  pantoprazole    Tablet 40 milliGRAM(s) Oral before breakfast  simvastatin 10 milliGRAM(s) Oral at bedtime  traMADol 50 milliGRAM(s) Oral at bedtime      Vital Signs Last 24 Hrs  T(C): 37.7 (02 Mar 2018 10:20), Max: 37.7 (02 Mar 2018 10:20)  T(F): 99.9 (02 Mar 2018 10:20), Max: 99.9 (02 Mar 2018 10:20)  HR: 114 (02 Mar 2018 10:20) (68 - 118)  BP: 149/44 (02 Mar 2018 10:20) (123/39 - 149/44)  BP(mean): --  RR: 17 (02 Mar 2018 10:20) (17 - 18)  SpO2: 93% (02 Mar 2018 10:20) (93% - 99%)     Neurological exam:  HF: AxO x 3. Appropriately interactive, normal affect. Speech fluent, No Aphasia or paraphasic errors. Naming /repetition intact   CN: PEARRL, EOMI, VFF, facial sensation normal, no NLFD, tongue midline, Palate moves equally, SCM equal bilaterally  Motor: No pronator drift, RUE 5/5, LUE 5/5prox, 1/5 distal, RLE s/p amputation, LLE 4/5  Sens: Intact to light touch / PP/ VS/ JS    Reflexes: 2+ left patella unable to test babinski   Coord:  dysmetria finger to nose LUE   Gait/Balance: Non ambulatory at baseline     NIHSS: 2                       7.8    8.0   )-----------( 213      ( 02 Mar 2018 07:22 )             24.8     03-02    143  |  115<H>  |  70<H>  ----------------------------<  130<H>  4.4   |  19<L>  |  1.85<H>    Ca    8.2<L>      02 Mar 2018 07:22  Mg     1.5     03-02    TPro  5.8<L>  /  Alb  1.9<L>  /  TBili  0.2  /  DBili  x   /  AST  58<H>  /  ALT  75  /  AlkPhos  62  03-01

## 2018-03-02 NOTE — SWALLOW BEDSIDE ASSESSMENT ADULT - ADDITIONAL RECOMMENDATIONS
1) NEURO F/U    2) NUTRITION F/U TO NUTRITIONALLY MODIFY DIE TO ACCOMMODATE HIS MULTIPLE MEDICAL ISSUES. 1) NEURO F/U    2) NUTRITION F/U TO NUTRITIONALLY MODIFY DIET TO ACCOMMODATE HIS MULTIPLE MEDICAL ISSUES.

## 2018-03-02 NOTE — SWALLOW BEDSIDE ASSESSMENT ADULT - SWALLOW EVAL: DIAGNOSIS
1) The patient demonstrates feeding compromise in  setting of acute LUE weakness which is atop Oropharyngeal Swallowing which subjectively appears to be within functional parameters for age. No behavioral aspiration signs exhibited on exam.   2) Pt was alert and interactive but irritable at times. He was able to verbalize during communicative probes and in conversation. Verbalizations were intelligible, fluent, linguistically intact and contextually appropriate. No underlying dysarthria, verbal apraxia or aphasia were evident on exam. At reported communicative baseline.

## 2018-03-02 NOTE — CONSULT NOTE ADULT - ASSESSMENT
82 y/o wm with hx of ckd stage 3 ( 1.5-1.7) presents with focal weakness ( LUE) currently being evaluated for acute CVA noted with ARYA/ckd with RUL PNA.  AOCD with hx of GI bleed and with LLE venous stasis r/o OM.    PLAN  - PO fluids  - continue with lasix po  - fu h/h trend. since anemia related to gi blood loss in past, slowly, will monitor off LETICIA agents  - Neuro work-up on going, await MRI  - tachycardia, fu EKG  - FU MRI of LE

## 2018-03-02 NOTE — SWALLOW BEDSIDE ASSESSMENT ADULT - COMMENTS
The patient was admitted to  with left sided weakness suspect for an acute right CVA. This profile is superimposed upon a history of recent LLE cellulitis, PVD s/p right BKA, A-Fib, CHF, COPD, SHAYY, HLD, iron deficiency anemia, gout, BPH, CKD, past GIB and prior RUE/RLE DVT's in the past. See below for additional prior medical information.

## 2018-03-02 NOTE — PHYSICAL THERAPY INITIAL EVALUATION ADULT - ADDITIONAL COMMENTS
Patient able to WB on L LE to pivot transfer to the w/c.  Daughter now concerned with new L UE weakness.

## 2018-03-03 DIAGNOSIS — M21.332 WRIST DROP, LEFT WRIST: ICD-10-CM

## 2018-03-03 LAB
ANION GAP SERPL CALC-SCNC: 10 MMOL/L — SIGNIFICANT CHANGE UP (ref 5–17)
BUN SERPL-MCNC: 75 MG/DL — HIGH (ref 7–23)
CALCIUM SERPL-MCNC: 8.2 MG/DL — LOW (ref 8.5–10.1)
CHLORIDE SERPL-SCNC: 115 MMOL/L — HIGH (ref 96–108)
CO2 SERPL-SCNC: 18 MMOL/L — LOW (ref 22–31)
CREAT SERPL-MCNC: 2.2 MG/DL — HIGH (ref 0.5–1.3)
GLUCOSE SERPL-MCNC: 117 MG/DL — HIGH (ref 70–99)
HCT VFR BLD CALC: 27 % — LOW (ref 39–50)
HGB BLD-MCNC: 8.7 G/DL — LOW (ref 13–17)
MAGNESIUM SERPL-MCNC: 2 MG/DL — SIGNIFICANT CHANGE UP (ref 1.6–2.6)
MCHC RBC-ENTMCNC: 29.8 PG — SIGNIFICANT CHANGE UP (ref 27–34)
MCHC RBC-ENTMCNC: 32.2 GM/DL — SIGNIFICANT CHANGE UP (ref 32–36)
MCV RBC AUTO: 92.6 FL — SIGNIFICANT CHANGE UP (ref 80–100)
PLATELET # BLD AUTO: 221 K/UL — SIGNIFICANT CHANGE UP (ref 150–400)
POTASSIUM SERPL-MCNC: 4 MMOL/L — SIGNIFICANT CHANGE UP (ref 3.5–5.3)
POTASSIUM SERPL-SCNC: 4 MMOL/L — SIGNIFICANT CHANGE UP (ref 3.5–5.3)
RBC # BLD: 2.92 M/UL — LOW (ref 4.2–5.8)
RBC # FLD: 14.5 % — SIGNIFICANT CHANGE UP (ref 10.3–14.5)
SODIUM SERPL-SCNC: 143 MMOL/L — SIGNIFICANT CHANGE UP (ref 135–145)
WBC # BLD: 9.7 K/UL — SIGNIFICANT CHANGE UP (ref 3.8–10.5)
WBC # FLD AUTO: 9.7 K/UL — SIGNIFICANT CHANGE UP (ref 3.8–10.5)

## 2018-03-03 PROCEDURE — 99232 SBSQ HOSP IP/OBS MODERATE 35: CPT

## 2018-03-03 PROCEDURE — 93010 ELECTROCARDIOGRAM REPORT: CPT

## 2018-03-03 RX ADMIN — LORATADINE 10 MILLIGRAM(S): 10 TABLET ORAL at 13:11

## 2018-03-03 RX ADMIN — AMLODIPINE BESYLATE 5 MILLIGRAM(S): 2.5 TABLET ORAL at 06:23

## 2018-03-03 RX ADMIN — Medication 0.5 MILLIGRAM(S): at 08:40

## 2018-03-03 RX ADMIN — CEFTRIAXONE 1000 MILLIGRAM(S): 500 INJECTION, POWDER, FOR SOLUTION INTRAMUSCULAR; INTRAVENOUS at 18:02

## 2018-03-03 RX ADMIN — HEPARIN SODIUM 5000 UNIT(S): 5000 INJECTION INTRAVENOUS; SUBCUTANEOUS at 22:09

## 2018-03-03 RX ADMIN — Medication 0.5 MILLIGRAM(S): at 20:15

## 2018-03-03 RX ADMIN — HEPARIN SODIUM 5000 UNIT(S): 5000 INJECTION INTRAVENOUS; SUBCUTANEOUS at 06:27

## 2018-03-03 RX ADMIN — AZITHROMYCIN 255 MILLIGRAM(S): 500 TABLET, FILM COATED ORAL at 18:04

## 2018-03-03 RX ADMIN — TRAMADOL HYDROCHLORIDE 50 MILLIGRAM(S): 50 TABLET ORAL at 23:00

## 2018-03-03 RX ADMIN — Medication 1000 UNIT(S): at 13:09

## 2018-03-03 RX ADMIN — Medication 25 MILLIGRAM(S): at 06:23

## 2018-03-03 RX ADMIN — GABAPENTIN 300 MILLIGRAM(S): 400 CAPSULE ORAL at 18:01

## 2018-03-03 RX ADMIN — Medication 100 MILLIGRAM(S): at 13:08

## 2018-03-03 RX ADMIN — Medication 40 MILLIGRAM(S): at 06:23

## 2018-03-03 RX ADMIN — Medication 50 MILLIGRAM(S): at 06:23

## 2018-03-03 RX ADMIN — ISOSORBIDE MONONITRATE 120 MILLIGRAM(S): 60 TABLET, EXTENDED RELEASE ORAL at 13:10

## 2018-03-03 RX ADMIN — Medication 25 MILLIGRAM(S): at 18:01

## 2018-03-03 RX ADMIN — Medication 8 MILLIGRAM(S): at 22:08

## 2018-03-03 RX ADMIN — HEPARIN SODIUM 5000 UNIT(S): 5000 INJECTION INTRAVENOUS; SUBCUTANEOUS at 13:11

## 2018-03-03 RX ADMIN — SIMVASTATIN 10 MILLIGRAM(S): 20 TABLET, FILM COATED ORAL at 22:09

## 2018-03-03 RX ADMIN — TRAMADOL HYDROCHLORIDE 50 MILLIGRAM(S): 50 TABLET ORAL at 22:08

## 2018-03-03 RX ADMIN — PANTOPRAZOLE SODIUM 40 MILLIGRAM(S): 20 TABLET, DELAYED RELEASE ORAL at 06:23

## 2018-03-03 RX ADMIN — GABAPENTIN 300 MILLIGRAM(S): 400 CAPSULE ORAL at 06:23

## 2018-03-03 RX ADMIN — Medication 81 MILLIGRAM(S): at 13:09

## 2018-03-03 RX ADMIN — Medication 650 MILLIGRAM(S): at 18:05

## 2018-03-03 RX ADMIN — Medication 325 MILLIGRAM(S): at 13:10

## 2018-03-03 NOTE — PROGRESS NOTE ADULT - ASSESSMENT
83 year old male with history of CAD s/p stents (LAD, RCA bare metal around 9/16), PVD s/p Right BKA, A.Fib (not on AC), Hypertension, COPD on home O2 2L, SHAYY,  Chronic diastolic CHF, Hyperlipidemia, PVD, Iron deficiency anemia, Chronic back pain, Gout, BPH, CKD III . Hx of GI bleed, RLE and RUE DVTs sent in from [facility] with weakness in LUE.     1. LUE weakness- CTH negative. Neuro following. MRI/MRA head/neck- negative. MRI negative for acute findings. 2D echo pending- echo from 6/17- reviewed. Continue ASA and statin. PT eval pending. Cont tele monitoring.    2. RUL PNA- cont abx as per primary team. Cx pending.     3. LLE with chronic stasis changes and base of heal ulcer- abx. Follow with Dr. Henry as outpt- vascular following.     4. Diastolic HF- appears euvolemic. Cont medical mgmt with BP control. Cr worsening to 2.29 this AM. Will hold on addtional lasix for now in setting of PNA and ARYA.     5. Anemia- Hgb decreased to 8.7 today. H/o GI bleed. Follow H/H and transfuse as needed.     6. CAD s/p PCI- cont medical mgmt with asa/bbl/statin. No ace/arb with worsening ARYA (Cr 2.2 today).    7. PAF- not on LTA with GI bleed hx and anemia.     8. HTN- well controlled at present. Cont current meds.     9. DVT proph, replete lytes as needed.

## 2018-03-03 NOTE — PROGRESS NOTE ADULT - SUBJECTIVE AND OBJECTIVE BOX
afebrile, VSS    MRI's noted: no acute CVA and no osteo L calcaneus    PE:    L wrist drop and weak grasp    excoriation (desquamation) of distal 1/3 L leg     A/P  CVA vs. peripheral neuropathy, carotids without significant stenosis    lower extremity ulceration, mixed i.e. both arterial and venous insufficiency    local care written    unload L heel    MEDICATIONS  (STANDING):  allopurinol 100 milliGRAM(s) Oral daily  amLODIPine   Tablet 5 milliGRAM(s) Oral daily  aspirin enteric coated 81 milliGRAM(s) Oral daily  azithromycin  IVPB 500 milliGRAM(s) IV Intermittent every 24 hours  buDESOnide   0.5 milliGRAM(s) Respule 0.5 milliGRAM(s) Inhalation two times a day  cefTRIAXone Injectable. 1000 milliGRAM(s) IV Push every 24 hours  cholecalciferol 1000 Unit(s) Oral daily  doxazosin 8 milliGRAM(s) Oral at bedtime  ferrous    sulfate 325 milliGRAM(s) Oral daily  furosemide    Tablet 40 milliGRAM(s) Oral daily  gabapentin 300 milliGRAM(s) Oral two times a day  heparin  Injectable 5000 Unit(s) SubCutaneous every 8 hours  hydrALAZINE 50 milliGRAM(s) Oral daily  isosorbide   mononitrate ER Tablet (IMDUR) 120 milliGRAM(s) Oral daily  loratadine 10 milliGRAM(s) Oral daily  metoprolol     tartrate 25 milliGRAM(s) Oral two times a day  pantoprazole    Tablet 40 milliGRAM(s) Oral before breakfast  simvastatin 10 milliGRAM(s) Oral at bedtime  traMADol 50 milliGRAM(s) Oral at bedtime    MEDICATIONS  (PRN):  acetaminophen   Tablet 650 milliGRAM(s) Oral every 6 hours PRN For Temp greater than 38 C (100.4 F)  ondansetron Injectable 4 milliGRAM(s) IV Push every 6 hours PRN Nausea  traMADol 25 milliGRAM(s) Oral two times a day PRN Moderate Pain (4 - 6)      Allergies    Zosyn (Rash)    Intolerances        Flatus: [ ] YES [ ] NO             Bowel Movement: [ ] YES [ ] NO  Pain (0-10):            Pain Control Adequate: [ ] YES [ ] NO  Nausea: [ ] YES [ ] NO            Vomiting: [ ] YES [ ] NO  Diarrhea: [ ] YES [ ] NO         Constipation: [ ] YES [ ] NO     Chest Pain: [ ] YES [ ] NO    SOB:  [ ] YES [ ] NO    Vital Signs Last 24 Hrs  T(C): 36.8 (03 Mar 2018 04:47), Max: 37.6 (02 Mar 2018 16:06)  T(F): 98.2 (03 Mar 2018 04:47), Max: 99.6 (02 Mar 2018 16:06)  HR: 111 (03 Mar 2018 04:47) (102 - 140)  BP: 138/44 (03 Mar 2018 04:47) (101/29 - 139/38)  BP(mean): --  RR: 18 (03 Mar 2018 04:47) (18 - 18)  SpO2: 95% (03 Mar 2018 04:47) (94% - 97%)    I&O's Summary    02 Mar 2018 07:01  -  03 Mar 2018 07:00  --------------------------------------------------------  IN: 648 mL / OUT: 1 mL / NET: 647 mL        Physical Exam:  General: NAD, resting comfortably  Pulmonary: normal resp effort, CTA-B  Cardiovascular: NSR  Abdominal: soft, NT/ND  Extremities: WWP, normal strength  Neuro: A/O x 3, CNs II-XII grossly intact, normal motor/sensation, no focal deficits  Pulses:   Right:                                                                          Left:  FEM [ ]2+ [ ]1+ [ ]doppler                                             FEM [ ]2+ [ ]1+ [ ]doppler    POP [ ]2+ [ ]1+ [ ]doppler                                             POP [ ]2+ [ ]1+ [ ]doppler    DP [ ]2+ [ ]1+ [ ]doppler                                                DP [ ]2+ [ ]1+ [ ]doppler  PT[ ]2+ [ ]1+ [ ]doppler                                                  PT [ ]2+ [ ]1+ [ ]doppler    LABS:                        8.7    9.7   )-----------( 221      ( 03 Mar 2018 06:49 )             27.0     03-03    143  |  115<H>  |  75<H>  ----------------------------<  117<H>  4.0   |  18<L>  |  2.20<H>    Ca    8.2<L>      03 Mar 2018 06:49  Mg     2.0     03-03            CAPILLARY BLOOD GLUCOSE          RADIOLOGY & ADDITIONAL TESTS:

## 2018-03-03 NOTE — PROGRESS NOTE ADULT - SUBJECTIVE AND OBJECTIVE BOX
HPI:  83 year old male with history of CAD s/p stents (LAD, RCA bare metal around 9/16), PVD s/p Right BKA, A.Fib (not on AC), Hypertension, COPD on home O2 2L, SHAYY,  Chronic diastolic CHF, Hyperlipidemia, PVD, Iron deficiency anemia, Chronic back pain, Gout, BPH, CKD III . Hx of GI bleed, RLE and RUE DVTs sent in from [facility] with weakness in LUE and slumping over to his left side which started around 745 this morning. He arrived to the ED around 1030. Code stroke was called and CT head negative for acute CVA. Pt not a candidate for TPA.     MEDICATIONS  (STANDING):  allopurinol 100 milliGRAM(s) Oral daily  amLODIPine   Tablet 5 milliGRAM(s) Oral daily  aspirin enteric coated 81 milliGRAM(s) Oral daily  azithromycin  IVPB 500 milliGRAM(s) IV Intermittent every 24 hours  buDESOnide   0.5 milliGRAM(s) Respule 0.5 milliGRAM(s) Inhalation two times a day  cefTRIAXone Injectable. 1000 milliGRAM(s) IV Push every 24 hours  cholecalciferol 1000 Unit(s) Oral daily  doxazosin 8 milliGRAM(s) Oral at bedtime  ferrous    sulfate 325 milliGRAM(s) Oral daily  furosemide    Tablet 40 milliGRAM(s) Oral daily  gabapentin 300 milliGRAM(s) Oral two times a day  heparin  Injectable 5000 Unit(s) SubCutaneous every 8 hours  hydrALAZINE 50 milliGRAM(s) Oral daily  isosorbide   mononitrate ER Tablet (IMDUR) 120 milliGRAM(s) Oral daily  loratadine 10 milliGRAM(s) Oral daily  metoprolol     tartrate 25 milliGRAM(s) Oral two times a day  pantoprazole    Tablet 40 milliGRAM(s) Oral before breakfast  simvastatin 10 milliGRAM(s) Oral at bedtime  traMADol 50 milliGRAM(s) Oral at bedtime    MEDICATIONS  (PRN):  acetaminophen   Tablet 650 milliGRAM(s) Oral every 6 hours PRN For Temp greater than 38 C (100.4 F)  ondansetron Injectable 4 milliGRAM(s) IV Push every 6 hours PRN Nausea  traMADol 25 milliGRAM(s) Oral two times a day PRN Moderate Pain (4 - 6)      Neurological Exam:  HF: Patient is alert and oriented x 3. There is dysarthria. Follows complex commands.   CN: Vision is intact to confrontation. Pupils are equal and reactive. Extra ocular muscles are intact. There is no facial droop or asymmetry. Tongue is midline. Sensation is intact in the face. Other CN II-XII are intact.  Motor: mld left extensor weakness of the left hand, otherwise ~4+/5.  Sensory: intact to touch.  DTR: 1-2/4 all 4 extremities.    Radiology report:    MRI brain:  < from: MR Head No Cont (03.02.18 @ 21:54) >  IMPRESSION:         No acute infarction or other acute intrinsic pathology.  Chronic   changes, as above.    < from: MR Angio Head No Cont (03.02.18 @ 21:57) >  IMPRESSION:         Normal head/brain MRA.    < end of copied text >    MRA brain/carotids  < from: MR Angio Neck No Cont (03.02.18 @ 21:58) >  IMPRESSION:       Normal study.    < end of copied text >

## 2018-03-03 NOTE — PROGRESS NOTE ADULT - SUBJECTIVE AND OBJECTIVE BOX
NEPHROLOGY INTERVAL HPI/OVERNIGHT EVENTS:  doing well  tolerating po  has some back discomfort  left arm weakness continues   no evidence of CVA      MEDICATIONS  (STANDING):  allopurinol 100 milliGRAM(s) Oral daily  amLODIPine   Tablet 5 milliGRAM(s) Oral daily  aspirin enteric coated 81 milliGRAM(s) Oral daily  azithromycin  IVPB 500 milliGRAM(s) IV Intermittent every 24 hours  buDESOnide   0.5 milliGRAM(s) Respule 0.5 milliGRAM(s) Inhalation two times a day  cefTRIAXone Injectable. 1000 milliGRAM(s) IV Push every 24 hours  cholecalciferol 1000 Unit(s) Oral daily  doxazosin 8 milliGRAM(s) Oral at bedtime  ferrous    sulfate 325 milliGRAM(s) Oral daily  furosemide    Tablet 40 milliGRAM(s) Oral daily  gabapentin 300 milliGRAM(s) Oral two times a day  heparin  Injectable 5000 Unit(s) SubCutaneous every 8 hours  hydrALAZINE 50 milliGRAM(s) Oral daily  isosorbide   mononitrate ER Tablet (IMDUR) 120 milliGRAM(s) Oral daily  loratadine 10 milliGRAM(s) Oral daily  metoprolol     tartrate 25 milliGRAM(s) Oral two times a day  pantoprazole    Tablet 40 milliGRAM(s) Oral before breakfast  simvastatin 10 milliGRAM(s) Oral at bedtime  traMADol 50 milliGRAM(s) Oral at bedtime    MEDICATIONS  (PRN):  acetaminophen   Tablet 650 milliGRAM(s) Oral every 6 hours PRN For Temp greater than 38 C (100.4 F)  ondansetron Injectable 4 milliGRAM(s) IV Push every 6 hours PRN Nausea  traMADol 25 milliGRAM(s) Oral two times a day PRN Moderate Pain (4 - 6)      Allergies    Zosyn (Rash)    Intolerances        I&O's Detail    02 Mar 2018 07:01  -  03 Mar 2018 07:00  --------------------------------------------------------  IN:    IV PiggyBack: 305 mL    Packed Red Blood Cells: 343 mL  Total IN: 648 mL    OUT:    Incontinent per Diaper: 1 mL  Total OUT: 1 mL    Total NET: 647 mL            Vital Signs Last 24 Hrs  T(C): 37.3 (03 Mar 2018 12:04), Max: 37.6 (02 Mar 2018 16:06)  T(F): 99.1 (03 Mar 2018 12:04), Max: 99.6 (02 Mar 2018 16:06)  HR: 106 (03 Mar 2018 12:04) (102 - 134)  BP: 116/39 (03 Mar 2018 12:04) (101/29 - 139/38)  BP(mean): --  RR: 20 (03 Mar 2018 12:04) (18 - 20)  SpO2: 98% (03 Mar 2018 12:04) (95% - 98%)  Daily     Daily Weight in k.5 (02 Mar 2018 18:45)    PHYSICAL EXAM:  General: alert. awake Ox3  HEENT: MMM  CV: s1s2 rrr  LUNGS: B/L CTA  EXT: no edema    LABS:                        8.7    9.7   )-----------( 221      ( 03 Mar 2018 06:49 )             27.0         143  |  115<H>  |  75<H>  ----------------------------<  117<H>  4.0   |  18<L>  |  2.20<H>    Ca    8.2<L>      03 Mar 2018 06:49  Mg     2.0     -          Magnesium, Serum: 2.0 mg/dL ( @ 06:49)    EXAM:  MR FOOT LT                            PROCEDURE DATE:  2018          INTERPRETATION:  EXAMINATION: MRI left foot without contrast    CLINICAL INFORMATION: Left heel ulcer. Evaluate for osteomyelitis.    TECHNIQUE: Multiplanar, multisequential MR imaging was performed.    FINDINGS: The exam is limited by motion artifact. There is a cutaneous   ulceration along the posterior aspect of calcaneus with adjacent skin   thickening and subcutaneous fat infiltration and edema, consistent with   cellulitis. There are no areas of adjacent marrow signal alteration to   suggest osteomyelitis.    There is thickening of central cord of the plantar fascia with a plantar   calcaneal enthesophyte. There is mild insertional Achilles tendinosis   with a retrocalcaneal enthesophyte. There is fatty atrophy and high T2   signal of the musculature about the foot, consistent with denervation.   There is circumferential subcutaneous soft tissue infiltration and skin   thickening about the ankle which may be related to cellulitis or edema.    IMPRESSION: Cutaneous ulceration along the posterior aspect of the   calcaneus. No osteomyelitis.                BRICE PERRY   This document has been electronically signed. Mar  3 2018  8:57AM

## 2018-03-03 NOTE — PROGRESS NOTE ADULT - SUBJECTIVE AND OBJECTIVE BOX
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 Pt presented with c/o weakness      03/03/18: Patient seen and examined. Still with LUE weakness, improving. MRI: no acute CVA. Discussed with daughter regarding management plan.         Vital Signs Last 24 Hrs  T(C): 37.3 (03 Mar 2018 12:04), Max: 37.6 (02 Mar 2018 16:06)  T(F): 99.1 (03 Mar 2018 12:04), Max: 99.6 (02 Mar 2018 16:06)  HR: 106 (03 Mar 2018 12:04) (102 - 134)  BP: 116/39 (03 Mar 2018 12:04) (101/29 - 139/38)  BP(mean): --  RR: 20 (03 Mar 2018 12:04) (18 - 20)  SpO2: 98% (03 Mar 2018 12:04) (95% - 98%)        PHYSICAL EXAM:      HEENT:  pupils equal and reactive, EOMI, tongue midline, facial symmetry, no oropharyngeal lesions, erythema, exudates, oral thrush  NECK:   supple, no carotid bruits, no palpable lymph nodes, no thyromegaly  CV:  +S1, +S2, regular/ tachy  RESP:   lungs clear to auscultation bilaterally, no wheezing, rales, rhonchi, good air entry bilaterally  GI:  abdomen soft, non-tender, non-distended, normal BS, no bruits, no abdominal masses, no palpable masses  MSK:   normal muscle tone, no atrophy, no rigidity, no contractions  EXT:   no clubbing, no cyanosis, no edema, no calf pain, swelling or erythema  VASCULAR:  +radial pulses; R BKA, LLE with ace wrap dressing  NEURO/MSK  AAOX3, follows all commands, able to move extremities spontaneously; L wrist drop,  SKIN:  no ulcers, lesions or rashes            LABS: all labs reviewed                          7.8    8.0   )-----------( 213      ( 02 Mar 2018 07:22 )             24.8     03-02    143  |  115<H>  |  70<H>  ----------------------------<  130<H>  4.4   |  19<L>  |  1.85<H>    Ca    8.2<L>      02 Mar 2018 07:22  Mg     1.5     03-02    TPro  5.8<L>  /  Alb  1.9<L>  /  TBili  0.2  /  DBili  x   /  AST  58<H>  /  ALT  75  /  AlkPhos  62  03-01    CARDIAC MARKERS ( 01 Mar 2018 13:37 )  0.022 ng/mL / x     / x     / x     / x      CARDIAC MARKERS ( 01 Mar 2018 10:46 )  0.021 ng/mL / x     / x     / x     / x        LIVER FUNCTIONS - ( 01 Mar 2018 10:46 )  Alb: 1.9 g/dL / Pro: 5.8 gm/dL / ALK PHOS: 62 U/L / ALT: 75 U/L / AST: 58 U/L / GGT: x           PT/INR - ( 01 Mar 2018 10:46 )   PT: 12.1 sec;   INR: 1.12 ratio      PTT - ( 01 Mar 2018 10:46 )  PTT:32.3 sec    Lactate, Blood: 0.7 mmol/L (03-01 @ 13:37)          MEDICATIONS:    allopurinol 100 milliGRAM(s) Oral daily  amLODIPine   Tablet 5 milliGRAM(s) Oral daily  aspirin enteric coated 81 milliGRAM(s) Oral daily  azithromycin  IVPB 500 milliGRAM(s) IV Intermittent every 24 hours  buDESOnide   0.5 milliGRAM(s) Respule 0.5 milliGRAM(s) Inhalation two times a day  cefTRIAXone Injectable. 1000 milliGRAM(s) IV Push every 24 hours  cholecalciferol 1000 Unit(s) Oral daily  doxazosin 8 milliGRAM(s) Oral at bedtime  ferrous    sulfate 325 milliGRAM(s) Oral daily  furosemide    Tablet 40 milliGRAM(s) Oral daily  gabapentin 300 milliGRAM(s) Oral two times a day  heparin  Injectable 5000 Unit(s) SubCutaneous every 8 hours  hydrALAZINE 50 milliGRAM(s) Oral daily  isosorbide   mononitrate ER Tablet (IMDUR) 120 milliGRAM(s) Oral daily  loratadine 10 milliGRAM(s) Oral daily  metoprolol     tartrate 25 milliGRAM(s) Oral two times a day  pantoprazole    Tablet 40 milliGRAM(s) Oral before breakfast  simvastatin 10 milliGRAM(s) Oral at bedtime  traMADol 50 milliGRAM(s) Oral at bedtime    MEDICATIONS  (PRN):  acetaminophen   Tablet 650 milliGRAM(s) Oral every 6 hours PRN For Temp greater than 38 C (100.4 F)  ondansetron Injectable 4 milliGRAM(s) IV Push every 6 hours PRN Nausea  traMADol 25 milliGRAM(s) Oral two times a day PRN Moderate Pain (4 - 6)    < from: Transthoracic Echocardiogram (03.02.18 @ 11:10) >  Summary     Mild septal left ventricular hypertrophy is present. Left ventricular   wall  motion is normal.The left ventricle is normal in size. Estimated left   ventricular ejection fraction is 60 %.   The aortic valve is well visualized, appears normal. Valve opening seems   to be normal. No aortic regurgitation is present.   The mitral valve was well visualized. The mitral valve leaflets appear   thin and normal. Mild to moderate mitral regurgitation is present.      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 Mild   periventricular white matter ischemia is noted.    < end of copied text >

## 2018-03-03 NOTE — PROGRESS NOTE ADULT - SUBJECTIVE AND OBJECTIVE BOX
Cardiology Consultation    HPI: 83 year old male with history of CAD s/p stents (LAD, RCA bare metal around 9/16), PVD s/p Right BKA, A.Fib (not on AC), Hypertension, COPD on home O2 2L, SHAYY,  Chronic diastolic CHF, Hyperlipidemia, PVD, Iron deficiency anemia, Chronic back pain, Gout, BPH, CKD III . Hx of GI bleed, RLE and RUE DVTs sent in from [facility] assisted [facility] with weakness in LUE and slumping over to his left side which started around 745 this morning. He arrived to the ED around 1030. Code stroke was called and CT head negative for acute CVA. Pt not a candidate for TPA. Currently pt resting comfortably. Complains of chronic back pain. Continues to have weakness in LUE. No headaches, changes in vision, dizziness, chest pain, palpitations, SOB, abd pain, N/V, diarrhea, cough. Pt has been following Dr. Mccracken for LLE cellulitis and was recently taken off abx, he saw a wound care specialist and is pending an MRI of the left foot to r/o OM. He has also been following closely with renal Dr. Tomas for ARYA on CKD and for hyperkalemia for which he received Kayexalate one day prior to admission.  Pt also noted dark stools secondary to iron supplements and was scheduled to follow up with GI. (01 Mar 2018 16:33)    3/3- No CP/SOB. No fevers. Lying flat in bed. ST/SR on tele.     PAST MEDICAL & SURGICAL HISTORY:  Diastolic CHF  Peripheral vascular disease  Afib  Anemia  CKD (chronic kidney disease)  COPD (chronic obstructive pulmonary disease)  SHAYY (obstructive sleep apnea)  Sepsis, due to unspecified organism: 2/2 poorly healing wounds b/l  Dyspepsia: On moderate exertion.  Sleep apnea, obstructive: Requires home 02 therapy, and treatment with BIPAP  Atelectasis  Pleural effusion, bilateral  Respiratory failure  Peripheral edema  CRI (chronic renal insufficiency)  Gout  Benign prostatic hypertrophy  Spinal stenosis  Hypercholesterolemia  GERD (gastroesophageal reflux disease)  CAD (coronary artery disease)  Hypertension  S/P angioplasty with stent  Cataract of left eye  Prostate: Surgery green light procedure.  S/P rotator cuff surgery: Right  S/P angioplasty    Allergies  Zosyn (Rash)    SOCIAL HISTORY: Denies tobacco, etoh abuse or illicit drug use    FAMILY HISTORY: No pertinent family history in first degree relatives    MEDICATIONS  (STANDING):  allopurinol 100 milliGRAM(s) Oral daily  amLODIPine   Tablet 5 milliGRAM(s) Oral daily  aspirin enteric coated 81 milliGRAM(s) Oral daily  azithromycin  IVPB 500 milliGRAM(s) IV Intermittent every 24 hours  buDESOnide   0.5 milliGRAM(s) Respule 0.5 milliGRAM(s) Inhalation two times a day  cefTRIAXone Injectable. 1000 milliGRAM(s) IV Push every 24 hours  cholecalciferol 1000 Unit(s) Oral daily  doxazosin 8 milliGRAM(s) Oral at bedtime  ferrous    sulfate 325 milliGRAM(s) Oral daily  furosemide    Tablet 40 milliGRAM(s) Oral daily  gabapentin 300 milliGRAM(s) Oral two times a day  heparin  Injectable 5000 Unit(s) SubCutaneous every 8 hours  hydrALAZINE 50 milliGRAM(s) Oral daily  isosorbide   mononitrate ER Tablet (IMDUR) 120 milliGRAM(s) Oral daily  loratadine 10 milliGRAM(s) Oral daily  metoprolol     tartrate 25 milliGRAM(s) Oral two times a day  pantoprazole    Tablet 40 milliGRAM(s) Oral before breakfast  simvastatin 10 milliGRAM(s) Oral at bedtime  traMADol 50 milliGRAM(s) Oral at bedtime    MEDICATIONS  (PRN):  acetaminophen   Tablet 650 milliGRAM(s) Oral every 6 hours PRN For Temp greater than 38 C (100.4 F)  ondansetron Injectable 4 milliGRAM(s) IV Push every 6 hours PRN Nausea  traMADol 25 milliGRAM(s) Oral two times a day PRN Moderate Pain (4 - 6)      Vital Signs Last 24 Hrs  T(C): 36.4 (02 Mar 2018 05:08), Max: 38.2 (01 Mar 2018 11:05)  T(F): 97.5 (02 Mar 2018 05:08), Max: 100.8 (01 Mar 2018 11:05)  HR: 118 (02 Mar 2018 05:08) (101 - 118)  BP: 131/45 (02 Mar 2018 05:08) (123/39 - 135/36)  BP(mean): --  RR: 18 (02 Mar 2018 05:08) (18 - 20)  SpO2: 96% (02 Mar 2018 05:08) (96% - 99%)    REVIEW OF SYSTEMS:    CONSTITUTIONAL:  As per HPI.  HEENT:  Eyes:  No diplopia or blurred vision. ENT:  No earache, sore throat or runny nose.  CARDIOVASCULAR:  No pressure, squeezing, strangling, tightness, heaviness or aching about the chest, neck, axilla or epigastrium.  RESPIRATORY:  No cough, shortness of breath, PND or orthopnea.  GASTROINTESTINAL:  No nausea, vomiting or diarrhea.  GENITOURINARY:  No dysuria, frequency or urgency.  MUSCULOSKELETAL:  As per HPI.  SKIN:  No change in skin, hair or nails.  NEUROLOGIC:  No paresthesias, fasciculations, seizures or weakness.    PHYSICAL EXAMINATION:    GENERAL APPEARANCE:  Pt. is not currently dyspneic, in no distress. Pt. is alert, oriented, and pleasant.  HEENT:  Pupils are normal and react normally. No icterus. Mucous membranes well colored.  NECK:  Supple. No lymphadenopathy. Jugular venous pressure not elevated. Carotids equal.   HEART:   The cardiac impulse has a normal quality. There are no murmurs, rubs or gallops noted  CHEST:  Chest is clear to auscultation. Normal respiratory effort.  ABDOMEN:  Soft and nontender.   EXTREMITIES:  There is no edema.     I&O's Summary      LABS:                        8.3    10.0  )-----------( 251      ( 01 Mar 2018 10:46 )             26.6     03-01    143  |  115<H>  |  82<H>  ----------------------------<  114<H>  5.1   |  18<L>  |  2.29<H>    Ca    7.9<L>      01 Mar 2018 10:46    TPro  5.8<L>  /  Alb  1.9<L>  /  TBili  0.2  /  DBili  x   /  AST  58<H>  /  ALT  75  /  AlkPhos  62  03-01    LIVER FUNCTIONS - ( 01 Mar 2018 10:46 )  Alb: 1.9 g/dL / Pro: 5.8 gm/dL / ALK PHOS: 62 U/L / ALT: 75 U/L / AST: 58 U/L / GGT: x           PT/INR - ( 01 Mar 2018 10:46 )   PT: 12.1 sec;   INR: 1.12 ratio         PTT - ( 01 Mar 2018 10:46 )  PTT:32.3 sec  CARDIAC MARKERS ( 01 Mar 2018 13:37 )  0.022 ng/mL / x     / x     / x     / x      CARDIAC MARKERS ( 01 Mar 2018 10:46 )  0.021 ng/mL / x     / x     / x     / x        EKG:   < from: 12 Lead ECG (03.01.18 @ 10:32) >  Sinus tachycardia  Otherwise normal ECG    < end of copied text >    TELEMETRY: SR    CARDIAC TESTS:   < from: Transthoracic Echocardiogram (06.19.17 @ 14:13) >  Fibrocalcific changes noted to the mitral valve leaflets with preserved   leaflet excursion.   Moderate (2+) mitral regurgitation is present.   Mild mitral annular calcification is present.   Fibrocalcific changes noted to the aortic valveleaflets with preserved   excursion.   Normal tricuspid valve structure and function.   Mild (1+) tricuspid valve regurgitation is present.   Normal pulmonic valve structure and function.   Trace to Mild pulmonic valvular regurgitation is present.   The left atrium appears mildly dilated.   The left ventricle is normal in size, wall motion and contractility.   Mild concentric left ventricular hypertrophy is present.   Estimated left ventricular ejection fraction is 55-60%.   Normal right atrium.   Normal right ventricle structure and function.   No evidence of pericardial effusion.   No evidence of pleural effusion.    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 Mild   periventricular white matter ischemia is< from: MR Head No Cont (03.02.18 @ 21:54) >  IMPRESSION:         No acute infarction or other acute intrinsic pathology.  Chronic   changes, as   above.  	    ASSESSMENT & PLAN:

## 2018-03-03 NOTE — PROGRESS NOTE ADULT - ASSESSMENT
82 year old Man with pmhx as above presented to  ED, sent in from [place] assisted [facility] with c/o weakness in LUE and slumping over to his left side which started around 7:45 the morning of his arrival    LUE weakness  No CVA/TIA  ?Neuropathy-outpatient EMG  Continue PT  - CTH negative   - MRI/MRA head/neck - no acute changes  - neurology following     RUL PNA- on cxr  - con't ceftriaxone and azithromycin    Chronic HFpEF  - appears compensated   - con't lasix / hydralazine / Imdur / asa   - TTE reviewed  - cardio consult input noted    CAD / PAD / DVT  - hx PCI  - chest pain free  -  con't asa / BB / statin   - had IVC filter placed last  encounter    LLE with chronic stasis   - LLE dsg with ace wrap in place  - Wound care as per Dr Urbano Murphy  - con't metoprolol 25 mg bid  - not on anticoag d/t recurrent GIB    HTN  - con't above meds  - monitor per protocol    ARYA on CKD stage 3  - has also been following closely with renal Dr. Tomas for ARYA on CKD and for hyperkalemia for which he received Kayexalate one day prior to admission.   -  crt last encounter was 1.92 at discharge on 9/14/17 was 1.48; chart review notable for crt 1.92 in 12/2017  -Renal eval    Anemia  S/P one unit of PRBC yesterday  - hx GIB last encounter requiring PRBC's (~3 units)  - downtrend in H/H 8.3--> 7.8 (was 10.1 / 31 in Dec 2017)  -Follow H/H    Dispo  - full code

## 2018-03-03 NOTE — PROGRESS NOTE ADULT - ASSESSMENT
83 year old man hx of CAD, presents with left upper extremity weakness, c/o left elbow pain, left wrist drop. No evidence by MRI of acute CVA.

## 2018-03-03 NOTE — PROGRESS NOTE ADULT - ASSESSMENT
82 y/o wm with hx of ckd stage 3 ( 1.5-1.7) presents with focal weakness ( LUE) currently being evaluated for acute CVA noted with ARYA/ckd with RUL PNA.  AOCD with hx of GI bleed and with LLE venous stasis r/o OM.    PLAN  - PO fluids  - continue with lasix po  - fu h/h trend. since anemia related to gi blood loss in past, slowly, will monitor off LETICIA agents  - Neuro work-up on going, await MRI  - tachycardia, fu EKG  - FU MRI of LE    3/3 MK  - ARYA/CKD with variability:  will hold lasix and monitor until establish adequate po intake.  fu uop pattern  - metabolic acidosis: with lactate neg, will fu trend  - PNA; on abx  - LUE weakness: neuro input noted, PT eval  - anemia; fu h.h  - LE venous stasis: no OM on MRI

## 2018-03-04 LAB
ANION GAP SERPL CALC-SCNC: 10 MMOL/L — SIGNIFICANT CHANGE UP (ref 5–17)
BUN SERPL-MCNC: 83 MG/DL — HIGH (ref 7–23)
CALCIUM SERPL-MCNC: 8.3 MG/DL — LOW (ref 8.5–10.1)
CHLORIDE SERPL-SCNC: 113 MMOL/L — HIGH (ref 96–108)
CO2 SERPL-SCNC: 19 MMOL/L — LOW (ref 22–31)
CREAT SERPL-MCNC: 2.72 MG/DL — HIGH (ref 0.5–1.3)
GLUCOSE SERPL-MCNC: 96 MG/DL — SIGNIFICANT CHANGE UP (ref 70–99)
HCT VFR BLD CALC: 25.3 % — LOW (ref 39–50)
HGB BLD-MCNC: 7.9 G/DL — LOW (ref 13–17)
MAGNESIUM SERPL-MCNC: 2.1 MG/DL — SIGNIFICANT CHANGE UP (ref 1.6–2.6)
MCHC RBC-ENTMCNC: 28.8 PG — SIGNIFICANT CHANGE UP (ref 27–34)
MCHC RBC-ENTMCNC: 31.1 GM/DL — LOW (ref 32–36)
MCV RBC AUTO: 92.5 FL — SIGNIFICANT CHANGE UP (ref 80–100)
PLATELET # BLD AUTO: 203 K/UL — SIGNIFICANT CHANGE UP (ref 150–400)
POTASSIUM SERPL-MCNC: 3.9 MMOL/L — SIGNIFICANT CHANGE UP (ref 3.5–5.3)
POTASSIUM SERPL-SCNC: 3.9 MMOL/L — SIGNIFICANT CHANGE UP (ref 3.5–5.3)
RBC # BLD: 2.73 M/UL — LOW (ref 4.2–5.8)
RBC # FLD: 14.2 % — SIGNIFICANT CHANGE UP (ref 10.3–14.5)
SODIUM SERPL-SCNC: 142 MMOL/L — SIGNIFICANT CHANGE UP (ref 135–145)
WBC # BLD: 6.8 K/UL — SIGNIFICANT CHANGE UP (ref 3.8–10.5)
WBC # FLD AUTO: 6.8 K/UL — SIGNIFICANT CHANGE UP (ref 3.8–10.5)

## 2018-03-04 RX ORDER — SODIUM CHLORIDE 9 MG/ML
1000 INJECTION, SOLUTION INTRAVENOUS
Qty: 0 | Refills: 0 | Status: DISCONTINUED | OUTPATIENT
Start: 2018-03-04 | End: 2018-03-08

## 2018-03-04 RX ADMIN — HEPARIN SODIUM 5000 UNIT(S): 5000 INJECTION INTRAVENOUS; SUBCUTANEOUS at 23:16

## 2018-03-04 RX ADMIN — GABAPENTIN 300 MILLIGRAM(S): 400 CAPSULE ORAL at 05:24

## 2018-03-04 RX ADMIN — Medication 1000 UNIT(S): at 13:24

## 2018-03-04 RX ADMIN — Medication 100 MILLIGRAM(S): at 13:25

## 2018-03-04 RX ADMIN — Medication 50 MILLIGRAM(S): at 05:24

## 2018-03-04 RX ADMIN — LORATADINE 10 MILLIGRAM(S): 10 TABLET ORAL at 13:26

## 2018-03-04 RX ADMIN — SODIUM CHLORIDE 50 MILLILITER(S): 9 INJECTION, SOLUTION INTRAVENOUS at 18:46

## 2018-03-04 RX ADMIN — SIMVASTATIN 10 MILLIGRAM(S): 20 TABLET, FILM COATED ORAL at 23:16

## 2018-03-04 RX ADMIN — CEFTRIAXONE 1000 MILLIGRAM(S): 500 INJECTION, POWDER, FOR SOLUTION INTRAMUSCULAR; INTRAVENOUS at 18:41

## 2018-03-04 RX ADMIN — Medication 8 MILLIGRAM(S): at 23:16

## 2018-03-04 RX ADMIN — TRAMADOL HYDROCHLORIDE 50 MILLIGRAM(S): 50 TABLET ORAL at 23:17

## 2018-03-04 RX ADMIN — Medication 81 MILLIGRAM(S): at 13:23

## 2018-03-04 RX ADMIN — HEPARIN SODIUM 5000 UNIT(S): 5000 INJECTION INTRAVENOUS; SUBCUTANEOUS at 05:24

## 2018-03-04 RX ADMIN — GABAPENTIN 300 MILLIGRAM(S): 400 CAPSULE ORAL at 18:40

## 2018-03-04 RX ADMIN — ISOSORBIDE MONONITRATE 120 MILLIGRAM(S): 60 TABLET, EXTENDED RELEASE ORAL at 13:25

## 2018-03-04 RX ADMIN — AZITHROMYCIN 255 MILLIGRAM(S): 500 TABLET, FILM COATED ORAL at 18:45

## 2018-03-04 RX ADMIN — TRAMADOL HYDROCHLORIDE 25 MILLIGRAM(S): 50 TABLET ORAL at 09:56

## 2018-03-04 RX ADMIN — Medication 25 MILLIGRAM(S): at 18:40

## 2018-03-04 RX ADMIN — AMLODIPINE BESYLATE 5 MILLIGRAM(S): 2.5 TABLET ORAL at 05:26

## 2018-03-04 RX ADMIN — Medication 650 MILLIGRAM(S): at 19:21

## 2018-03-04 RX ADMIN — Medication 650 MILLIGRAM(S): at 05:24

## 2018-03-04 RX ADMIN — HEPARIN SODIUM 5000 UNIT(S): 5000 INJECTION INTRAVENOUS; SUBCUTANEOUS at 13:25

## 2018-03-04 RX ADMIN — PANTOPRAZOLE SODIUM 40 MILLIGRAM(S): 20 TABLET, DELAYED RELEASE ORAL at 05:25

## 2018-03-04 RX ADMIN — Medication 325 MILLIGRAM(S): at 13:23

## 2018-03-04 RX ADMIN — Medication 0.5 MILLIGRAM(S): at 07:52

## 2018-03-04 RX ADMIN — Medication 25 MILLIGRAM(S): at 05:24

## 2018-03-04 NOTE — PROGRESS NOTE ADULT - ASSESSMENT
83 year old male with history of CAD s/p stents (LAD, RCA bare metal around 9/16), PVD s/p Right BKA, A.Fib (not on AC), Hypertension, COPD on home O2 2L, SHAYY,  Chronic diastolic CHF, Hyperlipidemia, PVD, Iron deficiency anemia, Chronic back pain, Gout, BPH, CKD III . Hx of GI bleed, RLE and RUE DVTs sent in from [place] [facility] with weakness in LUE.     1. LUE weakness- CTH negative. Neuro following. MRI/MRA head/neck- negative. MRI negative for acute findings. 2D echo pending- echo from 6/17- reviewed. Continue ASA and statin. PT eval pending. Cont tele monitoring. Will need NCS as well.     2. RUL PNA- cont abx as per primary team. Cx pending.     3. LLE with chronic stasis changes and base of heal ulcer- abx. Follow with Dr. Henry as outpt- vascular following.     4. Diastolic HF- appears euvolemic. Cont medical mgmt with BP control. Cr worsening to 2.7 this AM. Will hold on addtional lasix for now in setting of PNA and ARYA. Renal consult pending.    5. Anemia- Hgb decreased to 7.9 today. H/o GI bleed. Follow H/H and transfuse as needed to keep Hgb > 8.    6. CAD s/p PCI- cont medical mgmt with asa/bbl/statin. No ace/arb with worsening ARYA (Cr 2.7 today).    7. PAF- not on LTA with GI bleed hx and anemia.     8. HTN- well controlled at present. Cont current meds.     9. DVT proph, replete lytes as needed.

## 2018-03-04 NOTE — PROGRESS NOTE ADULT - ASSESSMENT
82 year old Man with pmhx as above presented to  ED, sent in from [place] [facility] with c/o weakness in LUE and slumping over to his left side which started around 7:45 the morning of his arrival    LUE weakness  No CVA/TIA  ?Neuropathy-outpatient EMG  Continue PT  - CTH negative   - MRI/MRA head/neck - no acute changes  - neurology follow up appreciated     RUL PNA-   - con't ceftriaxone and azithromycin  -Blood cultures: no growth    Chronic HFpEF  - appears compensated   -  Continue hydralazine / Imdur / asa   -Hold lasix due to worsening renal function  - TTE reviewed  - cardio consult input noted    CAD / PAD / DVT  - hx PCI  -  con't asa / BB / statin   - had IVC filter placed last  encounter    LLE with chronic stasis   - LLE dsg with ace wrap in place  - Wound care as per Dr Urbano Murphy  - con't metoprolol 25 mg bid  - not on anticoag d/t recurrent GIB    HTN  - con't above meds  - monitor per protocol    ARYA on CKD stage 3  - has also been following closely with renal Dr. Tomas for ARYA on CKD and for hyperkalemia for which he received Kayexalate one day prior to admission.   -  crt last encounter was 1.92 at discharge on 9/14/17 was 1.48; chart review notable for crt 1.92 in 12/2017  -Renal eval appreciated.  -Hold lasix  -Follow BMP    Anemia  S/P one unit of PRBC on 03/02/18  - hx GIB last encounter requiring PRBC's (~3 units)  -Follow H/H    Dispo  - full code

## 2018-03-04 NOTE — PROGRESS NOTE ADULT - ASSESSMENT
82 y/o wm with hx of ckd stage 3 ( 1.5-1.7) presents with focal weakness ( LUE) currently being evaluated for acute CVA noted with ARYA/ckd with RUL PNA.  AOCD with hx of GI bleed and with LLE venous stasis r/o OM.    PLAN  - PO fluids  - continue with lasix po  - fu h/h trend. since anemia related to gi blood loss in past, slowly, will monitor off LETICIA agents  - Neuro work-up on going, await MRI  - tachycardia, fu EKG  - FU MRI of LE    3/3 MK  - ARYA/CKD with variability:  will hold lasix and monitor until establish adequate po intake.  fu uop pattern  - metabolic acidosis: with lactate neg, will fu trend  - PNA; on abx  - LUE weakness: neuro input noted, PT eval  - anemia; fu h.h    3/4 MK  - ARYA/CKD increasign, check bladder scan, trial of ivf   off lasix  - metabolic acidosis: fu trend  - LUE weakness: pt emg as outpt  - anemia: fu trend of h/h  dw dr hernandez  - LE venous stasis: no OM on MRI

## 2018-03-04 NOTE — PROGRESS NOTE ADULT - SUBJECTIVE AND OBJECTIVE BOX
Cardiology Progress Note    HPI: 83 year old male with history of CAD s/p stents (LAD, RCA bare metal around 9/16), PVD s/p Right BKA, A.Fib (not on AC), Hypertension, COPD on home O2 2L, SHAYY,  Chronic diastolic CHF, Hyperlipidemia, PVD, Iron deficiency anemia, Chronic back pain, Gout, BPH, CKD III . Hx of GI bleed, RLE and RUE DVTs sent in from [facility] assisted [facility] with weakness in LUE and slumping over to his left side which started around 745 this morning. He arrived to the ED around 1030. Code stroke was called and CT head negative for acute CVA. Pt not a candidate for TPA. Currently pt resting comfortably. Complains of chronic back pain. Continues to have weakness in LUE. No headaches, changes in vision, dizziness, chest pain, palpitations, SOB, abd pain, N/V, diarrhea, cough. Pt has been following Dr. Mccracken for LLE cellulitis and was recently taken off abx, he saw a wound care specialist and is pending an MRI of the left foot to r/o OM. He has also been following closely with renal Dr. Tomas for ARYA on CKD and for hyperkalemia for which he received Kayexalate one day prior to admission.  Pt also noted dark stools secondary to iron supplements and was scheduled to follow up with GI. (01 Mar 2018 16:33)    3/3- No CP/SOB. No fevers. Lying flat in bed. ST/SR on tele.     3/4- Case d/w daughter at beside. Continues to have LUE weakness. No CP/SOB. +Fevers last pm (101). SR/STach on tele.     PAST MEDICAL & SURGICAL HISTORY:  Diastolic CHF  Peripheral vascular disease  Afib  Anemia  CKD (chronic kidney disease)  COPD (chronic obstructive pulmonary disease)  SHAYY (obstructive sleep apnea)  Sepsis, due to unspecified organism: 2/2 poorly healing wounds b/l  Dyspepsia: On moderate exertion.  Sleep apnea, obstructive: Requires home 02 therapy, and treatment with BIPAP  Atelectasis  Pleural effusion, bilateral  Respiratory failure  Peripheral edema  CRI (chronic renal insufficiency)  Gout  Benign prostatic hypertrophy  Spinal stenosis  Hypercholesterolemia  GERD (gastroesophageal reflux disease)  CAD (coronary artery disease)  Hypertension  S/P angioplasty with stent  Cataract of left eye  Prostate: Surgery green light procedure.  S/P rotator cuff surgery: Right  S/P angioplasty    Allergies  Zosyn (Rash)    SOCIAL HISTORY: Denies tobacco, etoh abuse or illicit drug use    FAMILY HISTORY: No pertinent family history in first degree relatives    MEDICATIONS  (STANDING):  allopurinol 100 milliGRAM(s) Oral daily  amLODIPine   Tablet 5 milliGRAM(s) Oral daily  aspirin enteric coated 81 milliGRAM(s) Oral daily  azithromycin  IVPB 500 milliGRAM(s) IV Intermittent every 24 hours  buDESOnide   0.5 milliGRAM(s) Respule 0.5 milliGRAM(s) Inhalation two times a day  cefTRIAXone Injectable. 1000 milliGRAM(s) IV Push every 24 hours  cholecalciferol 1000 Unit(s) Oral daily  doxazosin 8 milliGRAM(s) Oral at bedtime  ferrous    sulfate 325 milliGRAM(s) Oral daily  furosemide    Tablet 40 milliGRAM(s) Oral daily  gabapentin 300 milliGRAM(s) Oral two times a day  heparin  Injectable 5000 Unit(s) SubCutaneous every 8 hours  hydrALAZINE 50 milliGRAM(s) Oral daily  isosorbide   mononitrate ER Tablet (IMDUR) 120 milliGRAM(s) Oral daily  loratadine 10 milliGRAM(s) Oral daily  metoprolol     tartrate 25 milliGRAM(s) Oral two times a day  pantoprazole    Tablet 40 milliGRAM(s) Oral before breakfast  simvastatin 10 milliGRAM(s) Oral at bedtime  traMADol 50 milliGRAM(s) Oral at bedtime    MEDICATIONS  (PRN):  acetaminophen   Tablet 650 milliGRAM(s) Oral every 6 hours PRN For Temp greater than 38 C (100.4 F)  ondansetron Injectable 4 milliGRAM(s) IV Push every 6 hours PRN Nausea  traMADol 25 milliGRAM(s) Oral two times a day PRN Moderate Pain (4 - 6)      Vital Signs Last 24 Hrs  T(C): 36.4 (02 Mar 2018 05:08), Max: 38.2 (01 Mar 2018 11:05)  T(F): 97.5 (02 Mar 2018 05:08), Max: 100.8 (01 Mar 2018 11:05)  HR: 118 (02 Mar 2018 05:08) (101 - 118)  BP: 131/45 (02 Mar 2018 05:08) (123/39 - 135/36)  BP(mean): --  RR: 18 (02 Mar 2018 05:08) (18 - 20)  SpO2: 96% (02 Mar 2018 05:08) (96% - 99%)    REVIEW OF SYSTEMS:    CONSTITUTIONAL:  As per HPI.  HEENT:  Eyes:  No diplopia or blurred vision. ENT:  No earache, sore throat or runny nose.  CARDIOVASCULAR:  No pressure, squeezing, strangling, tightness, heaviness or aching about the chest, neck, axilla or epigastrium.  RESPIRATORY:  No cough, shortness of breath, PND or orthopnea.  GASTROINTESTINAL:  No nausea, vomiting or diarrhea.  GENITOURINARY:  No dysuria, frequency or urgency.  MUSCULOSKELETAL:  As per HPI.  SKIN:  No change in skin, hair or nails.  NEUROLOGIC:  No paresthesias, fasciculations, seizures or weakness.    PHYSICAL EXAMINATION:    GENERAL APPEARANCE:  Pt. is not currently dyspneic, in no distress. Pt. is alert, oriented, and pleasant.  HEENT:  Pupils are normal and react normally. No icterus. Mucous membranes well colored.  NECK:  Supple. No lymphadenopathy. Jugular venous pressure not elevated. Carotids equal.   HEART:   The cardiac impulse has a normal quality. There are no murmurs, rubs or gallops noted  CHEST:  Chest is clear to auscultation. Normal respiratory effort.  ABDOMEN:  Soft and nontender.   EXTREMITIES:  There is no edema.     I&O's Summary      LABS:                        8.3    10.0  )-----------( 251      ( 01 Mar 2018 10:46 )             26.6     03-01    143  |  115<H>  |  82<H>  ----------------------------<  114<H>  5.1   |  18<L>  |  2.29<H>    Ca    7.9<L>      01 Mar 2018 10:46    TPro  5.8<L>  /  Alb  1.9<L>  /  TBili  0.2  /  DBili  x   /  AST  58<H>  /  ALT  75  /  AlkPhos  62  03-01    LIVER FUNCTIONS - ( 01 Mar 2018 10:46 )  Alb: 1.9 g/dL / Pro: 5.8 gm/dL / ALK PHOS: 62 U/L / ALT: 75 U/L / AST: 58 U/L / GGT: x           PT/INR - ( 01 Mar 2018 10:46 )   PT: 12.1 sec;   INR: 1.12 ratio         PTT - ( 01 Mar 2018 10:46 )  PTT:32.3 sec  CARDIAC MARKERS ( 01 Mar 2018 13:37 )  0.022 ng/mL / x     / x     / x     / x      CARDIAC MARKERS ( 01 Mar 2018 10:46 )  0.021 ng/mL / x     / x     / x     / x        EKG:   < from: 12 Lead ECG (03.01.18 @ 10:32) >  Sinus tachycardia  Otherwise normal ECG    < end of copied text >    TELEMETRY: SR    CARDIAC TESTS:   < from: Transthoracic Echocardiogram (06.19.17 @ 14:13) >  Fibrocalcific changes noted to the mitral valve leaflets with preserved   leaflet excursion.   Moderate (2+) mitral regurgitation is present.   Mild mitral annular calcification is present.   Fibrocalcific changes noted to the aortic valveleaflets with preserved   excursion.   Normal tricuspid valve structure and function.   Mild (1+) tricuspid valve regurgitation is present.   Normal pulmonic valve structure and function.   Trace to Mild pulmonic valvular regurgitation is present.   The left atrium appears mildly dilated.   The left ventricle is normal in size, wall motion and contractility.   Mild concentric left ventricular hypertrophy is present.   Estimated left ventricular ejection fraction is 55-60%.   Normal right atrium.   Normal right ventricle structure and function.   No evidence of pericardial effusion.   No evidence of pleural effusion.    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 Mild   periventricular white matter ischemia is< from: MR Head No Cont (03.02.18 @ 21:54) >  IMPRESSION:         No acute infarction or other acute intrinsic pathology.  Chronic   changes, as   above.  	    ASSESSMENT & PLAN:

## 2018-03-04 NOTE — PROGRESS NOTE ADULT - SUBJECTIVE AND OBJECTIVE BOX
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 Pt presented with c/o weakness      03/03/18: Patient seen and examined. Still with LUE weakness, improving. MRI: no acute CVA. Discussed with daughter regarding management plan.   03/04/18: Patient seen and examined. Looks and feels better today. Left arm weakness improving. Discussed with patient and daughter at bed side regarding management and d/c plan.         Vital Signs Last 24 Hrs  T(C): 36.6 (04 Mar 2018 11:35), Max: 38.7 (03 Mar 2018 19:58)  T(F): 97.9 (04 Mar 2018 11:35), Max: 101.6 (03 Mar 2018 19:58)  HR: 96 (04 Mar 2018 11:35) (83 - 117)  BP: 109/35 (04 Mar 2018 11:35) (109/35 - 128/39)  BP(mean): --  RR: 20 (04 Mar 2018 11:35) (18 - 20)  SpO2: 91% (04 Mar 2018 11:35) (91% - 98%)      PHYSICAL EXAM:      HEENT:  pupils equal and reactive, EOMI, tongue midline, facial symmetry, no oropharyngeal lesions, erythema, exudates, oral thrush  NECK:   supple, no carotid bruits, no palpable lymph nodes, no thyromegaly  CV:  +S1, +S2, regular/ tachy  RESP:   lungs clear to auscultation bilaterally, no wheezing, rales, rhonchi, good air entry bilaterally  GI:  abdomen soft, non-tender, non-distended, normal BS, no bruits, no abdominal masses, no palpable masses  MSK:   normal muscle tone, no atrophy, no rigidity, no contractions  EXT:   no clubbing, no cyanosis, no edema, no calf pain, swelling or erythema  VASCULAR:  +radial pulses; R BKA, LLE with ace wrap dressing  NEURO/MSK  AAOX3, follows all commands, able to move extremities spontaneously; L wrist drop,  SKIN:  no ulcers, lesions or rashes            LABS: all labs reviewed                                       7.9    6.8   )-----------( 203      ( 04 Mar 2018 06:29 )             25.3     04 Mar 2018 06:29    142    |  113    |  83     ----------------------------<  96     3.9     |  19     |  2.72     Ca    8.3        04 Mar 2018 06:29  Mg     2.1       04 Mar 2018 06:29                MEDICATIONS:      MEDICATIONS  (STANDING):  allopurinol 100 milliGRAM(s) Oral daily  amLODIPine   Tablet 5 milliGRAM(s) Oral daily  aspirin enteric coated 81 milliGRAM(s) Oral daily  azithromycin  IVPB 500 milliGRAM(s) IV Intermittent every 24 hours  buDESOnide   0.5 milliGRAM(s) Respule 0.5 milliGRAM(s) Inhalation two times a day  cefTRIAXone Injectable. 1000 milliGRAM(s) IV Push every 24 hours  cholecalciferol 1000 Unit(s) Oral daily  doxazosin 8 milliGRAM(s) Oral at bedtime  ferrous    sulfate 325 milliGRAM(s) Oral daily  gabapentin 300 milliGRAM(s) Oral two times a day  heparin  Injectable 5000 Unit(s) SubCutaneous every 8 hours  hydrALAZINE 50 milliGRAM(s) Oral daily  isosorbide   mononitrate ER Tablet (IMDUR) 120 milliGRAM(s) Oral daily  loratadine 10 milliGRAM(s) Oral daily  metoprolol     tartrate 25 milliGRAM(s) Oral two times a day  pantoprazole    Tablet 40 milliGRAM(s) Oral before breakfast  simvastatin 10 milliGRAM(s) Oral at bedtime  traMADol 50 milliGRAM(s) Oral at bedtime    MEDICATIONS  (PRN):  acetaminophen   Tablet 650 milliGRAM(s) Oral every 6 hours PRN For Temp greater than 38 C (100.4 F)  ondansetron Injectable 4 milliGRAM(s) IV Push every 6 hours PRN Nausea  traMADol 25 milliGRAM(s) Oral two times a day PRN Moderate Pain (4 - 6)      < from: Transthoracic Echocardiogram (03.02.18 @ 11:10) >  Summary     Mild septal left ventricular hypertrophy is present. Left ventricular   wall  motion is normal.The left ventricle is normal in size. Estimated left   ventricular ejection fraction is 60 %.   The aortic valve is well visualized, appears normal. Valve opening seems   to be normal. No aortic regurgitation is present.   The mitral valve was well visualized. The mitral valve leaflets appear   thin and normal. Mild to moderate mitral regurgitation is present.      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 Mild   periventricular white matter ischemia is noted.    < end of copied text >

## 2018-03-04 NOTE — PROGRESS NOTE ADULT - SUBJECTIVE AND OBJECTIVE BOX
febrile to 101.6 F yesterday    L leg about the same    continue antibiotics and local care    MEDICATIONS  (STANDING):  allopurinol 100 milliGRAM(s) Oral daily  amLODIPine   Tablet 5 milliGRAM(s) Oral daily  aspirin enteric coated 81 milliGRAM(s) Oral daily  azithromycin  IVPB 500 milliGRAM(s) IV Intermittent every 24 hours  buDESOnide   0.5 milliGRAM(s) Respule 0.5 milliGRAM(s) Inhalation two times a day  cefTRIAXone Injectable. 1000 milliGRAM(s) IV Push every 24 hours  cholecalciferol 1000 Unit(s) Oral daily  doxazosin 8 milliGRAM(s) Oral at bedtime  ferrous    sulfate 325 milliGRAM(s) Oral daily  gabapentin 300 milliGRAM(s) Oral two times a day  heparin  Injectable 5000 Unit(s) SubCutaneous every 8 hours  hydrALAZINE 50 milliGRAM(s) Oral daily  isosorbide   mononitrate ER Tablet (IMDUR) 120 milliGRAM(s) Oral daily  loratadine 10 milliGRAM(s) Oral daily  metoprolol     tartrate 25 milliGRAM(s) Oral two times a day  pantoprazole    Tablet 40 milliGRAM(s) Oral before breakfast  simvastatin 10 milliGRAM(s) Oral at bedtime  traMADol 50 milliGRAM(s) Oral at bedtime    MEDICATIONS  (PRN):  acetaminophen   Tablet 650 milliGRAM(s) Oral every 6 hours PRN For Temp greater than 38 C (100.4 F)  ondansetron Injectable 4 milliGRAM(s) IV Push every 6 hours PRN Nausea  traMADol 25 milliGRAM(s) Oral two times a day PRN Moderate Pain (4 - 6)      Allergies    Zosyn (Rash)    Intolerances        Flatus: [ ] YES [ ] NO             Bowel Movement: [ ] YES [ ] NO  Pain (0-10):            Pain Control Adequate: [ ] YES [ ] NO  Nausea: [ ] YES [ ] NO            Vomiting: [ ] YES [ ] NO  Diarrhea: [ ] YES [ ] NO         Constipation: [ ] YES [ ] NO     Chest Pain: [ ] YES [ ] NO    SOB:  [ ] YES [ ] NO    Vital Signs Last 24 Hrs  T(C): 36.6 (04 Mar 2018 04:57), Max: 38.7 (03 Mar 2018 19:58)  T(F): 97.9 (04 Mar 2018 04:57), Max: 101.6 (03 Mar 2018 19:58)  HR: 83 (04 Mar 2018 04:57) (83 - 117)  BP: 122/32 (04 Mar 2018 04:57) (116/39 - 128/39)  BP(mean): --  RR: 18 (04 Mar 2018 04:57) (18 - 20)  SpO2: 95% (04 Mar 2018 04:57) (95% - 98%)    I&O's Summary      Physical Exam:  General: NAD, resting comfortably  Pulmonary: normal resp effort, CTA-B  Cardiovascular: NSR  Abdominal: soft, NT/ND  Extremities: WWP, normal strength  Neuro: A/O x 3, CNs II-XII grossly intact, normal motor/sensation, no focal deficits  Pulses:   Right:                                                                          Left:  FEM [ ]2+ [ ]1+ [ ]doppler                                             FEM [ ]2+ [ ]1+ [ ]doppler    POP [ ]2+ [ ]1+ [ ]doppler                                             POP [ ]2+ [ ]1+ [ ]doppler    DP [ ]2+ [ ]1+ [ ]doppler                                                DP [ ]2+ [ ]1+ [ ]doppler  PT[ ]2+ [ ]1+ [ ]doppler                                                  PT [ ]2+ [ ]1+ [ ]doppler    LABS:                        7.9    6.8   )-----------( 203      ( 04 Mar 2018 06:29 )             25.3     03-04    142  |  113<H>  |  83<H>  ----------------------------<  96  3.9   |  19<L>  |  2.72<H>    Ca    8.3<L>      04 Mar 2018 06:29  Mg     2.1     03-04            CAPILLARY BLOOD GLUCOSE          RADIOLOGY & ADDITIONAL TESTS:

## 2018-03-05 LAB
ANION GAP SERPL CALC-SCNC: 10 MMOL/L — SIGNIFICANT CHANGE UP (ref 5–17)
BUN SERPL-MCNC: 86 MG/DL — HIGH (ref 7–23)
CALCIUM SERPL-MCNC: 8.1 MG/DL — LOW (ref 8.5–10.1)
CHLORIDE SERPL-SCNC: 110 MMOL/L — HIGH (ref 96–108)
CO2 SERPL-SCNC: 18 MMOL/L — LOW (ref 22–31)
CREAT SERPL-MCNC: 2.52 MG/DL — HIGH (ref 0.5–1.3)
GLUCOSE SERPL-MCNC: 108 MG/DL — HIGH (ref 70–99)
HCT VFR BLD CALC: 24.2 % — LOW (ref 39–50)
HGB BLD-MCNC: 7.7 G/DL — LOW (ref 13–17)
MCHC RBC-ENTMCNC: 29.2 PG — SIGNIFICANT CHANGE UP (ref 27–34)
MCHC RBC-ENTMCNC: 31.8 GM/DL — LOW (ref 32–36)
MCV RBC AUTO: 91.8 FL — SIGNIFICANT CHANGE UP (ref 80–100)
PLATELET # BLD AUTO: 203 K/UL — SIGNIFICANT CHANGE UP (ref 150–400)
POTASSIUM SERPL-MCNC: 4.1 MMOL/L — SIGNIFICANT CHANGE UP (ref 3.5–5.3)
POTASSIUM SERPL-SCNC: 4.1 MMOL/L — SIGNIFICANT CHANGE UP (ref 3.5–5.3)
RBC # BLD: 2.64 M/UL — LOW (ref 4.2–5.8)
RBC # FLD: 14.1 % — SIGNIFICANT CHANGE UP (ref 10.3–14.5)
SODIUM SERPL-SCNC: 138 MMOL/L — SIGNIFICANT CHANGE UP (ref 135–145)
WBC # BLD: 6.2 K/UL — SIGNIFICANT CHANGE UP (ref 3.8–10.5)
WBC # FLD AUTO: 6.2 K/UL — SIGNIFICANT CHANGE UP (ref 3.8–10.5)

## 2018-03-05 PROCEDURE — 99232 SBSQ HOSP IP/OBS MODERATE 35: CPT

## 2018-03-05 RX ADMIN — Medication 81 MILLIGRAM(S): at 11:46

## 2018-03-05 RX ADMIN — TRAMADOL HYDROCHLORIDE 50 MILLIGRAM(S): 50 TABLET ORAL at 00:15

## 2018-03-05 RX ADMIN — AZITHROMYCIN 255 MILLIGRAM(S): 500 TABLET, FILM COATED ORAL at 19:06

## 2018-03-05 RX ADMIN — Medication 650 MILLIGRAM(S): at 05:18

## 2018-03-05 RX ADMIN — AMLODIPINE BESYLATE 5 MILLIGRAM(S): 2.5 TABLET ORAL at 05:18

## 2018-03-05 RX ADMIN — Medication 1000 UNIT(S): at 11:42

## 2018-03-05 RX ADMIN — ISOSORBIDE MONONITRATE 120 MILLIGRAM(S): 60 TABLET, EXTENDED RELEASE ORAL at 11:42

## 2018-03-05 RX ADMIN — HEPARIN SODIUM 5000 UNIT(S): 5000 INJECTION INTRAVENOUS; SUBCUTANEOUS at 15:35

## 2018-03-05 RX ADMIN — Medication 50 MILLIGRAM(S): at 05:18

## 2018-03-05 RX ADMIN — Medication 25 MILLIGRAM(S): at 05:18

## 2018-03-05 RX ADMIN — HEPARIN SODIUM 5000 UNIT(S): 5000 INJECTION INTRAVENOUS; SUBCUTANEOUS at 22:36

## 2018-03-05 RX ADMIN — GABAPENTIN 300 MILLIGRAM(S): 400 CAPSULE ORAL at 05:18

## 2018-03-05 RX ADMIN — Medication 25 MILLIGRAM(S): at 19:06

## 2018-03-05 RX ADMIN — Medication 8 MILLIGRAM(S): at 22:36

## 2018-03-05 RX ADMIN — PANTOPRAZOLE SODIUM 40 MILLIGRAM(S): 20 TABLET, DELAYED RELEASE ORAL at 05:18

## 2018-03-05 RX ADMIN — LORATADINE 10 MILLIGRAM(S): 10 TABLET ORAL at 11:42

## 2018-03-05 RX ADMIN — TRAMADOL HYDROCHLORIDE 50 MILLIGRAM(S): 50 TABLET ORAL at 22:36

## 2018-03-05 RX ADMIN — Medication 0.5 MILLIGRAM(S): at 08:44

## 2018-03-05 RX ADMIN — Medication 100 MILLIGRAM(S): at 11:48

## 2018-03-05 RX ADMIN — HEPARIN SODIUM 5000 UNIT(S): 5000 INJECTION INTRAVENOUS; SUBCUTANEOUS at 05:18

## 2018-03-05 RX ADMIN — GABAPENTIN 300 MILLIGRAM(S): 400 CAPSULE ORAL at 19:07

## 2018-03-05 RX ADMIN — Medication 325 MILLIGRAM(S): at 11:42

## 2018-03-05 RX ADMIN — TRAMADOL HYDROCHLORIDE 25 MILLIGRAM(S): 50 TABLET ORAL at 11:41

## 2018-03-05 RX ADMIN — CEFTRIAXONE 1000 MILLIGRAM(S): 500 INJECTION, POWDER, FOR SOLUTION INTRAMUSCULAR; INTRAVENOUS at 19:06

## 2018-03-05 RX ADMIN — Medication 0.5 MILLIGRAM(S): at 21:53

## 2018-03-05 RX ADMIN — SIMVASTATIN 10 MILLIGRAM(S): 20 TABLET, FILM COATED ORAL at 22:36

## 2018-03-05 NOTE — PROGRESS NOTE ADULT - SUBJECTIVE AND OBJECTIVE BOX
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 Pt presented with c/o weakness        Review of system- Rest of the review of system are negative except mentioned in HPI    VITALS:  Vital Signs Last 24 Hrs  T(C): 36.9 (05 Mar 2018 11:36), Max: 38.3 (04 Mar 2018 22:20)  T(F): 98.5 (05 Mar 2018 11:36), Max: 100.9 (04 Mar 2018 22:20)  HR: 90 (05 Mar 2018 11:36) (90 - 108)  BP: 116/33 (05 Mar 2018 11:36) (116/33 - 133/42)  BP(mean): 64 (04 Mar 2018 17:31) (64 - 64)  RR: 18 (05 Mar 2018 11:36) (18 - 18)  SpO2: 96% (05 Mar 2018 11:36) (90% - 96%)      PHYSICAL EXAM:  HEENT:  pupils equal and reactive, EOMI, tongue midline, facial symmetry, no oropharyngeal lesions, erythema, exudates, oral thrush  NECK:   supple, no carotid bruits, no palpable lymph nodes, no thyromegaly  CV:  +S1, +S2, RRR  RESP:   lungs clear to auscultation bilaterally, no wheezing, rales, rhonchi, good air entry bilaterally  GI:  abdomen soft, non-tender, non-distended, normal BS, no bruits, no abdominal masses, no palpable masses  MSK:   normal muscle tone, no atrophy, no rigidity, no contractions  EXT:   no clubbing, no cyanosis, no edema, no calf pain, swelling or erythema  VASCULAR:  +radial pulses; R BKA, LLE with ace wrap dressing  NEURO/MSK  AAOX3, follows all commands, able to move extremities spontaneously; L wrist drop,  SKIN:  no ulcers, lesions or rashes    LABS: all labs reviewed                            7.7    6.2   )-----------( 203      ( 05 Mar 2018 06:04 )             24.2     03-05    138  |  110<H>  |  86<H>  ----------------------------<  108<H>  4.1   |  18<L>  |  2.52<H>    Ca    8.1<L>      05 Mar 2018 06:04  Mg     2.1     03-04    MEDICATIONS  (STANDING):  allopurinol 100 milliGRAM(s) Oral daily  amLODIPine   Tablet 5 milliGRAM(s) Oral daily  aspirin enteric coated 81 milliGRAM(s) Oral daily  azithromycin  IVPB 500 milliGRAM(s) IV Intermittent every 24 hours  buDESOnide   0.5 milliGRAM(s) Respule 0.5 milliGRAM(s) Inhalation two times a day  cefTRIAXone Injectable. 1000 milliGRAM(s) IV Push every 24 hours  cholecalciferol 1000 Unit(s) Oral daily  doxazosin 8 milliGRAM(s) Oral at bedtime  ferrous    sulfate 325 milliGRAM(s) Oral daily  gabapentin 300 milliGRAM(s) Oral two times a day  heparin  Injectable 5000 Unit(s) SubCutaneous every 8 hours  hydrALAZINE 50 milliGRAM(s) Oral daily  isosorbide   mononitrate ER Tablet (IMDUR) 120 milliGRAM(s) Oral daily  loratadine 10 milliGRAM(s) Oral daily  metoprolol     tartrate 25 milliGRAM(s) Oral two times a day  pantoprazole    Tablet 40 milliGRAM(s) Oral before breakfast  simvastatin 10 milliGRAM(s) Oral at bedtime  sodium chloride 0.45%. 1000 milliLiter(s) (50 mL/Hr) IV Continuous <Continuous>  traMADol 50 milliGRAM(s) Oral at bedtime    MEDICATIONS  (PRN):  acetaminophen   Tablet 650 milliGRAM(s) Oral every 6 hours PRN For Temp greater than 38 C (100.4 F)  ondansetron Injectable 4 milliGRAM(s) IV Push every 6 hours PRN Nausea  traMADol 25 milliGRAM(s) Oral two times a day PRN Moderate Pain (4 - 6)      < from: Transthoracic Echocardiogram (03.02.18 @ 11:10) >  Summary     Mild septal left ventricular hypertrophy is present. Left ventricular   wall  motion is normal.The left ventricle is normal in size. Estimated left   ventricular ejection fraction is 60 %.   The aortic valve is well visualized, appears normal. Valve opening seems   to be normal. No aortic regurgitation is present.   The mitral valve was well visualized. The mitral valve leaflets appear   thin and normal. Mild to moderate mitral regurgitation is present.      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 Mild   periventricular white matter ischemia is noted.    < end of copied text >    < from: MR Angio Head No Cont (03.02.18 @ 21:57) >  IMPRESSION:         Normal head/brain MRA.    < end of copied text >    < from: MR Angio Neck No Cont (03.02.18 @ 21:58) >    IMPRESSION:     Normal study.      < end of copied text >    < from: MR Foot No Cont, Left (03.02.18 @ 21:59) >  IMPRESSION: Cutaneous ulceration along the posterior aspect of the   calcaneus. No osteomyelitis.    < end of copied text >

## 2018-03-05 NOTE — PROGRESS NOTE ADULT - SUBJECTIVE AND OBJECTIVE BOX
Cardiology Progress Note    HPI: 83 year old male with history of CAD s/p stents (LAD, RCA bare metal around 9/16), PVD s/p Right BKA, A.Fib (not on AC), Hypertension, COPD on home O2 2L, SHAYY,  Chronic diastolic CHF, Hyperlipidemia, PVD, Iron deficiency anemia, Chronic back pain, Gout, BPH, CKD III . Hx of GI bleed, RLE and RUE DVTs sent in from [facility] assisted [facility] with weakness in LUE and slumping over to his left side which started around 745 this morning. He arrived to the ED around 1030. Code stroke was called and CT head negative for acute CVA. Pt not a candidate for TPA. Currently pt resting comfortably. Complains of chronic back pain. Continues to have weakness in LUE. No headaches, changes in vision, dizziness, chest pain, palpitations, SOB, abd pain, N/V, diarrhea, cough. Pt has been following Dr. Mccracken for LLE cellulitis and was recently taken off abx, he saw a wound care specialist and is pending an MRI of the left foot to r/o OM. He has also been following closely with renal Dr. Tomas for ARYA on CKD and for hyperkalemia for which he received Kayexalate one day prior to admission.  Pt also noted dark stools secondary to iron supplements and was scheduled to follow up with GI. (01 Mar 2018 16:33)    3/3- No CP/SOB. No fevers. Lying flat in bed. ST/SR on tele.     3/4- Case d/w daughter at beside. Continues to have LUE weakness. No CP/SOB. +Fevers last pm (101). SR/STach on tele.     3/5- No events last pm. No change in LUE weakness. No CP/SOB.     PAST MEDICAL & SURGICAL HISTORY:  Diastolic CHF  Peripheral vascular disease  Afib  Anemia  CKD (chronic kidney disease)  COPD (chronic obstructive pulmonary disease)  SHAYY (obstructive sleep apnea)  Sepsis, due to unspecified organism: 2/2 poorly healing wounds b/l  Dyspepsia: On moderate exertion.  Sleep apnea, obstructive: Requires home 02 therapy, and treatment with BIPAP  Atelectasis  Pleural effusion, bilateral  Respiratory failure  Peripheral edema  CRI (chronic renal insufficiency)  Gout  Benign prostatic hypertrophy  Spinal stenosis  Hypercholesterolemia  GERD (gastroesophageal reflux disease)  CAD (coronary artery disease)  Hypertension  S/P angioplasty with stent  Cataract of left eye  Prostate: Surgery green light procedure.  S/P rotator cuff surgery: Right  S/P angioplasty    Allergies  Zosyn (Rash)    SOCIAL HISTORY: Denies tobacco, etoh abuse or illicit drug use    FAMILY HISTORY: No pertinent family history in first degree relatives    MEDICATIONS  (STANDING):  allopurinol 100 milliGRAM(s) Oral daily  amLODIPine   Tablet 5 milliGRAM(s) Oral daily  aspirin enteric coated 81 milliGRAM(s) Oral daily  azithromycin  IVPB 500 milliGRAM(s) IV Intermittent every 24 hours  buDESOnide   0.5 milliGRAM(s) Respule 0.5 milliGRAM(s) Inhalation two times a day  cefTRIAXone Injectable. 1000 milliGRAM(s) IV Push every 24 hours  cholecalciferol 1000 Unit(s) Oral daily  doxazosin 8 milliGRAM(s) Oral at bedtime  ferrous    sulfate 325 milliGRAM(s) Oral daily  furosemide    Tablet 40 milliGRAM(s) Oral daily  gabapentin 300 milliGRAM(s) Oral two times a day  heparin  Injectable 5000 Unit(s) SubCutaneous every 8 hours  hydrALAZINE 50 milliGRAM(s) Oral daily  isosorbide   mononitrate ER Tablet (IMDUR) 120 milliGRAM(s) Oral daily  loratadine 10 milliGRAM(s) Oral daily  metoprolol     tartrate 25 milliGRAM(s) Oral two times a day  pantoprazole    Tablet 40 milliGRAM(s) Oral before breakfast  simvastatin 10 milliGRAM(s) Oral at bedtime  traMADol 50 milliGRAM(s) Oral at bedtime    MEDICATIONS  (PRN):  acetaminophen   Tablet 650 milliGRAM(s) Oral every 6 hours PRN For Temp greater than 38 C (100.4 F)  ondansetron Injectable 4 milliGRAM(s) IV Push every 6 hours PRN Nausea  traMADol 25 milliGRAM(s) Oral two times a day PRN Moderate Pain (4 - 6)      Vital Signs Last 24 Hrs  T(C): 36.4 (02 Mar 2018 05:08), Max: 38.2 (01 Mar 2018 11:05)  T(F): 97.5 (02 Mar 2018 05:08), Max: 100.8 (01 Mar 2018 11:05)  HR: 118 (02 Mar 2018 05:08) (101 - 118)  BP: 131/45 (02 Mar 2018 05:08) (123/39 - 135/36)  BP(mean): --  RR: 18 (02 Mar 2018 05:08) (18 - 20)  SpO2: 96% (02 Mar 2018 05:08) (96% - 99%)    REVIEW OF SYSTEMS:    CONSTITUTIONAL:  As per HPI.  HEENT:  Eyes:  No diplopia or blurred vision. ENT:  No earache, sore throat or runny nose.  CARDIOVASCULAR:  No pressure, squeezing, strangling, tightness, heaviness or aching about the chest, neck, axilla or epigastrium.  RESPIRATORY:  No cough, shortness of breath, PND or orthopnea.  GASTROINTESTINAL:  No nausea, vomiting or diarrhea.  GENITOURINARY:  No dysuria, frequency or urgency.  MUSCULOSKELETAL:  As per HPI.  SKIN:  No change in skin, hair or nails.  NEUROLOGIC:  No paresthesias, fasciculations, seizures or weakness.    PHYSICAL EXAMINATION:    GENERAL APPEARANCE:  Pt. is not currently dyspneic, in no distress. Pt. is alert, oriented, and pleasant.  HEENT:  Pupils are normal and react normally. No icterus. Mucous membranes well colored.  NECK:  Supple. No lymphadenopathy. Jugular venous pressure not elevated. Carotids equal.   HEART:   The cardiac impulse has a normal quality. There are no murmurs, rubs or gallops noted  CHEST:  Chest is clear to auscultation. Normal respiratory effort.  ABDOMEN:  Soft and nontender.   EXTREMITIES:  There is no edema.     I&O's Summary      LABS:                        8.3    10.0  )-----------( 251      ( 01 Mar 2018 10:46 )             26.6     03-01    143  |  115<H>  |  82<H>  ----------------------------<  114<H>  5.1   |  18<L>  |  2.29<H>    Ca    7.9<L>      01 Mar 2018 10:46    TPro  5.8<L>  /  Alb  1.9<L>  /  TBili  0.2  /  DBili  x   /  AST  58<H>  /  ALT  75  /  AlkPhos  62  03-01    LIVER FUNCTIONS - ( 01 Mar 2018 10:46 )  Alb: 1.9 g/dL / Pro: 5.8 gm/dL / ALK PHOS: 62 U/L / ALT: 75 U/L / AST: 58 U/L / GGT: x           PT/INR - ( 01 Mar 2018 10:46 )   PT: 12.1 sec;   INR: 1.12 ratio         PTT - ( 01 Mar 2018 10:46 )  PTT:32.3 sec  CARDIAC MARKERS ( 01 Mar 2018 13:37 )  0.022 ng/mL / x     / x     / x     / x      CARDIAC MARKERS ( 01 Mar 2018 10:46 )  0.021 ng/mL / x     / x     / x     / x        EKG:   < from: 12 Lead ECG (03.01.18 @ 10:32) >  Sinus tachycardia  Otherwise normal ECG    < end of copied text >    TELEMETRY: SR    CARDIAC TESTS:   < from: Transthoracic Echocardiogram (06.19.17 @ 14:13) >  Fibrocalcific changes noted to the mitral valve leaflets with preserved   leaflet excursion.   Moderate (2+) mitral regurgitation is present.   Mild mitral annular calcification is present.   Fibrocalcific changes noted to the aortic valveleaflets with preserved   excursion.   Normal tricuspid valve structure and function.   Mild (1+) tricuspid valve regurgitation is present.   Normal pulmonic valve structure and function.   Trace to Mild pulmonic valvular regurgitation is present.   The left atrium appears mildly dilated.   The left ventricle is normal in size, wall motion and contractility.   Mild concentric left ventricular hypertrophy is present.   Estimated left ventricular ejection fraction is 55-60%.   Normal right atrium.   Normal right ventricle structure and function.   No evidence of pericardial effusion.   No evidence of pleural effusion.    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 Mild   periventricular white matter ischemia is< from: MR Head No Cont (03.02.18 @ 21:54) >  IMPRESSION:         No acute infarction or other acute intrinsic pathology.  Chronic   changes, as   above.  	    ASSESSMENT & PLAN:

## 2018-03-05 NOTE — PROGRESS NOTE ADULT - ASSESSMENT
83 year old male with history of CAD s/p stents (LAD, RCA bare metal around 9/16), PVD s/p Right BKA, A.Fib (not on AC), Hypertension, COPD on home O2 2L, SHAYY,  Chronic diastolic CHF, Hyperlipidemia, PVD, Iron deficiency anemia, Chronic back pain, Gout, BPH, CKD III . Hx of GI bleed, RLE and RUE DVTs sent in from [facility] assisted [facility] with weakness in LUE.     1. LUE weakness- CTH negative. Neuro following. MRI/MRA head/neck- negative. MRI negative for acute findings. 2D echo pending- echo from 6/17- reviewed. Continue ASA and statin. PT eval. Cont tele monitoring. Will need NCS/EMG as well.     2. RUL PNA- cont abx as per primary team. Cx pending.     3. LLE with chronic stasis changes and base of heal ulcer- abx. Follow with Dr. Henry as outpt- vascular following. No OM on MRI.    4. Diastolic HF- appears euvolemic. Cont medical mgmt with BP control. Cr mildly improved to 2.5 this AM. Will hold on addtional lasix for now in setting of PNA and ARYA. Renal consult pending.    5. Anemia- Hgb decreased to 7.7 today. H/o GI bleed. Follow H/H and transfuse as needed to keep Hgb > 8. Would transfuse 1 unit today.    6. CAD s/p PCI- cont medical mgmt with asa/bbl/statin. No ace/arb with worsening ARYA (Cr 2.5 today).    7. PAF- not on LTA with GI bleed hx and anemia.     8. HTN- well controlled at present. Cont current meds.     9. DVT proph, replete lytes as needed.

## 2018-03-05 NOTE — DIETITIAN INITIAL EVALUATION ADULT. - OTHER INFO
Nutrition assessment secondary to +pressure ulcer stage 2 or >. 82yo male with DX: of R/O CVA, and PNA. Pmhx of CHF, AFIB, Anemia, CKD, COPD, SHAYY, Syspepsia, and sleep apnea. S/P neuro eval indicating no acute infarction, however + left wrist drop noted. +Weight loss noted since previous hospital admission Sept, 2017 (7.6%- non significant possibly due to fluctuating po intake/fluid shifting). Pt appears well nourished and consuming ~100% of meals at this time as per daughter. S/P SLP evaluation as per CVA protocol- recommendations regular consistency with thin liquids. Skin: Heel unstageable, malleoulus unstageable with +2 edema documented. Recommendations: 1) MVI 1 tab po once daily, Vitamin C 500mg po once daily, Zinc sulfate 220mg po BID x 10 days to promote wound healing. 2) Prosource 1oz. po once daily for additional 15gm protein for increased needs to assist with wound healing. 3) offer food preferences for optimal intake.

## 2018-03-05 NOTE — DIETITIAN INITIAL EVALUATION ADULT. - ENERGY NEEDS
Ht.    66  "        Wt.  158.2  #              BMI 25.5                 IBW   133 # (adjusted for AKA)                 Pt is at   118 %  IBW

## 2018-03-05 NOTE — PROGRESS NOTE ADULT - ASSESSMENT
82 year old Man with pmhx as above presented to  ED, sent in from [facility] with c/o weakness in LUE and slumping over to his left side which started around 7:45 the morning of his arrival    LUE weakness  - CTH negative   - MRI/MRA head/neck - negative  - appreciate neurology input  - ongoing PT   - order placed for L hand splint     RUL PNA- on cxr  - low grade temp overnight (most recent 99.9 at midnight)  -  WBC wnl  - con't ceftriaxone and azithromycin    Chronic HFpEF  - appears compensated   - con't hydralazine / Imdur / asa   - lasix held for worsening renal function  - TTE reviewed  - cardio consult input noted    CAD / PAD / DVT  - hx PCI  - chest pain free  -  con't asa / BB / statin   - had IVC filter placed last  encounter    LLE with chronic stasis   - LLE dsg with ace wrap in place  - f/b vascular  - dsg change per vascular      pAFib  - Tele sinus with occasional PVC's  - con't metoprolol 25 mg bid  - not on anticoag d/t recurrent GIB    HTN  - con't above meds  - monitor per protocol    ARYA on CKD stage 3  - creatinine up trending  - has also been following closely with renal Dr. Tomas for ARYA on CKD and for hyperkalemia  -  crt last encounter was 1.92 at discharge on 9/14/17 was 1.48; chart review notable for crt 1.92 in 12/2017  - appreciate renal input  - lasix on hold    Anemia  - hx GIB last encounter requiring PRBC's (~3 units)  - occult stool negative   - suspect he's at his baseline  -  H/H 8.3 on adm and he has been hanging around 7.7-> 7.8 (was 10.1 in Dec 2017)  - GI consult (Dr Hernández) if he continues to downtrend     Dispo  - full code  - aim for discharge in 24 - 48 hrs

## 2018-03-05 NOTE — PROGRESS NOTE ADULT - SUBJECTIVE AND OBJECTIVE BOX
NEPHROLOGY INTERVAL HPI/OVERNIGHT EVENTS:  Doing well, no new complaints  feels better today  Bladder scan with 48cc      MEDICATIONS  (STANDING):  allopurinol 100 milliGRAM(s) Oral daily  amLODIPine   Tablet 5 milliGRAM(s) Oral daily  aspirin enteric coated 81 milliGRAM(s) Oral daily  azithromycin  IVPB 500 milliGRAM(s) IV Intermittent every 24 hours  buDESOnide   0.5 milliGRAM(s) Respule 0.5 milliGRAM(s) Inhalation two times a day  cefTRIAXone Injectable. 1000 milliGRAM(s) IV Push every 24 hours  cholecalciferol 1000 Unit(s) Oral daily  doxazosin 8 milliGRAM(s) Oral at bedtime  ferrous    sulfate 325 milliGRAM(s) Oral daily  gabapentin 300 milliGRAM(s) Oral two times a day  heparin  Injectable 5000 Unit(s) SubCutaneous every 8 hours  hydrALAZINE 50 milliGRAM(s) Oral daily  isosorbide   mononitrate ER Tablet (IMDUR) 120 milliGRAM(s) Oral daily  loratadine 10 milliGRAM(s) Oral daily  metoprolol     tartrate 25 milliGRAM(s) Oral two times a day  pantoprazole    Tablet 40 milliGRAM(s) Oral before breakfast  simvastatin 10 milliGRAM(s) Oral at bedtime  sodium chloride 0.45%. 1000 milliLiter(s) (50 mL/Hr) IV Continuous <Continuous>  traMADol 50 milliGRAM(s) Oral at bedtime    MEDICATIONS  (PRN):  acetaminophen   Tablet 650 milliGRAM(s) Oral every 6 hours PRN For Temp greater than 38 C (100.4 F)  ondansetron Injectable 4 milliGRAM(s) IV Push every 6 hours PRN Nausea  traMADol 25 milliGRAM(s) Oral two times a day PRN Moderate Pain (4 - 6)      Allergies    Zosyn (Rash)    Intolerances        I&O's Detail    04 Mar 2018 07:01  -  05 Mar 2018 07:00  --------------------------------------------------------  IN:    sodium chloride 0.45%.: 656 mL  Total IN: 656 mL    OUT:    Voided: 3 mL  Total OUT: 3 mL    Total NET: 653 mL          .    Patient was seen and evaluated on dialysis.   Patient is tolerating the procedure well.   Continue dialysis:   Dialyzer:          QB:        QD:   Goal UF ___ over ___ Hours       Vital Signs Last 24 Hrs  T(C): 36.9 (05 Mar 2018 04:44), Max: 38.3 (04 Mar 2018 22:20)  T(F): 98.5 (05 Mar 2018 04:44), Max: 100.9 (04 Mar 2018 22:20)  HR: 96 (05 Mar 2018 04:44) (96 - 108)  BP: 128/46 (05 Mar 2018 04:44) (128/46 - 133/42)  BP(mean): 64 (04 Mar 2018 17:31) (64 - 64)  RR: 18 (05 Mar 2018 04:44) (18 - 18)  SpO2: 95% (05 Mar 2018 04:44) (90% - 95%)  Daily     Daily Weight in k.7 (05 Mar 2018 04:44)    PHYSICAL EXAM:  General: alert. awake Ox3  HEENT: MMM  CV: s1s2 rrr  LUNGS: B/L CTA  EXT: no edema    LABS:                        7.7    6.2   )-----------( 203      ( 05 Mar 2018 06:04 )             24.2     03-05    138  |  110<H>  |  86<H>  ----------------------------<  108<H>  4.1   |  18<L>  |  2.52<H>    Ca    8.1<L>      05 Mar 2018 06:04  Mg     2.1     03-04

## 2018-03-05 NOTE — PROGRESS NOTE ADULT - ASSESSMENT
82 y/o wm with hx of ckd stage 3 ( 1.5-1.7) presents with focal weakness ( LUE) currently being evaluated for acute CVA noted with ARYA/ckd with RUL PNA.  AOCD with hx of GI bleed and with LLE venous stasis r/o OM.    PLAN  - PO fluids  - continue with lasix po  - fu h/h trend. since anemia related to gi blood loss in past, slowly, will monitor off LETICIA agents  - Neuro work-up on going, await MRI  - tachycardia, fu EKG  - FU MRI of LE    3/3 MK  - ARYA/CKD with variability:  will hold lasix and monitor until establish adequate po intake.  fu uop pattern  - metabolic acidosis: with lactate neg, will fu trend  - PNA; on abx  - LUE weakness: neuro input noted, PT eval  - anemia; fu h.h    3/4 MK  - ARYA/CKD increasign, check bladder scan, trial of ivf   off lasix  - metabolic acidosis: fu trend  - LUE weakness: pt emg as outpt  - anemia: fu trend of h/h  dw dr hernandez  - LE venous stasis: no OM on MRI    3/5 MK  - ARYA/CKD improving with ivf, will dec rate and continue. no evidence of retention  - metabolic acidosis: fu trend  - LUE weakness.  Left radial neuropathy PT and splint.  outpt emg  - Anemia: fu trend  - le venous stasis: no om on mri

## 2018-03-05 NOTE — PROGRESS NOTE ADULT - SUBJECTIVE AND OBJECTIVE BOX
S&O:  Pt has been lethargic, renal function being monitored closely, left wrist drop is persistent, dysarthria andr numbness has resolved.    MRI brain no acute infarct    ROS: as Above,  Lethargy; unable to obtain ROS    MEDICATIONS  (STANDING):  allopurinol 100 milliGRAM(s) Oral daily  amLODIPine   Tablet 5 milliGRAM(s) Oral daily  aspirin enteric coated 81 milliGRAM(s) Oral daily  azithromycin  IVPB 500 milliGRAM(s) IV Intermittent every 24 hours  buDESOnide   0.5 milliGRAM(s) Respule 0.5 milliGRAM(s) Inhalation two times a day  cefTRIAXone Injectable. 1000 milliGRAM(s) IV Push every 24 hours  cholecalciferol 1000 Unit(s) Oral daily  doxazosin 8 milliGRAM(s) Oral at bedtime  ferrous    sulfate 325 milliGRAM(s) Oral daily  gabapentin 300 milliGRAM(s) Oral two times a day  heparin  Injectable 5000 Unit(s) SubCutaneous every 8 hours  hydrALAZINE 50 milliGRAM(s) Oral daily  isosorbide   mononitrate ER Tablet (IMDUR) 120 milliGRAM(s) Oral daily  loratadine 10 milliGRAM(s) Oral daily  metoprolol     tartrate 25 milliGRAM(s) Oral two times a day  pantoprazole    Tablet 40 milliGRAM(s) Oral before breakfast  simvastatin 10 milliGRAM(s) Oral at bedtime  sodium chloride 0.45%. 1000 milliLiter(s) (50 mL/Hr) IV Continuous <Continuous>  traMADol 50 milliGRAM(s) Oral at bedtime      Vital Signs Last 24 Hrs  T(C): 36.9 (05 Mar 2018 04:44), Max: 38.3 (04 Mar 2018 22:20)  T(F): 98.5 (05 Mar 2018 04:44), Max: 100.9 (04 Mar 2018 22:20)  HR: 96 (05 Mar 2018 04:44) (96 - 108)  BP: 128/46 (05 Mar 2018 04:44) (109/35 - 133/42)  BP(mean): 64 (04 Mar 2018 17:31) (64 - 64)  RR: 18 (05 Mar 2018 04:44) (18 - 20)  SpO2: 95% (05 Mar 2018 04:44) (90% - 95%)    Neurological exam:  HF: Ax O x self. lethartgic but arousable, Speech fluent, No Aphasia or paraphasic errors. Naming /repetition intact   CN: PEARRL, EOMI, VFF, facial sensation normal, no NLFD, tongue midline, Palate moves equally, SCM equal bilaterally  Motor: No pronator drift, LUE 5/5 prox, 1/5 distal, RLE s/p amputation, LLE 4/5  Sens: Intact to light touch / PP/ VS/ JS    Reflexes: 2+ left patella unable to test babinski   Coord:  dysmetria finger to nose LUE   Gait/Balance: Non ambulatory at baseline                         7.7    6.2   )-----------( 203      ( 05 Mar 2018 06:04 )             24.2     03-05    138  |  110<H>  |  86<H>  ----------------------------<  108<H>  4.1   |  18<L>  |  2.52<H>    Ca    8.1<L>      05 Mar 2018 06:04  Mg     2.1     03-04    03-02 [id number] 5.6    MRI brain:  < from: MR Head No Cont (03.02.18 @ 21:54) >  IMPRESSION:         No acute infarction or other acute intrinsic pathology.  Chronic   changes, as above.    < from: MR Angio Head No Cont (03.02.18 @ 21:57) >  IMPRESSION:         Normal head/brain MRA.    < end of copied text >    MRA brain/carotids  < from: MR Angio Neck No Cont (03.02.18 @ 21:58) >  IMPRESSION:       Normal study.

## 2018-03-05 NOTE — PROGRESS NOTE ADULT - ASSESSMENT
# Acute right side infarct; kevyn lacunar; pt not a candidate for t-PA as there was no clear time of onset, clearly outside of 3 hour window period.   Left HP; UE > LE; pts leg weakness has resolved; has left wrist / hand weakness; MRI brain no acute infarct    # Most likley left radial neuropathy; ischemic/compressive     - OT/Physical Therapy   - Continue Aspirin / Lipitor  - DVT prolphylaxis  - will need EMG/NCV as op    D/W pt and his daughter    Call neuro if needed

## 2018-03-06 LAB
ANION GAP SERPL CALC-SCNC: 9 MMOL/L — SIGNIFICANT CHANGE UP (ref 5–17)
BUN SERPL-MCNC: 90 MG/DL — HIGH (ref 7–23)
CALCIUM SERPL-MCNC: 8.5 MG/DL — SIGNIFICANT CHANGE UP (ref 8.5–10.1)
CHLORIDE SERPL-SCNC: 112 MMOL/L — HIGH (ref 96–108)
CO2 SERPL-SCNC: 20 MMOL/L — LOW (ref 22–31)
CREAT SERPL-MCNC: 2.3 MG/DL — HIGH (ref 0.5–1.3)
CULTURE RESULTS: SIGNIFICANT CHANGE UP
CULTURE RESULTS: SIGNIFICANT CHANGE UP
GLUCOSE SERPL-MCNC: 100 MG/DL — HIGH (ref 70–99)
HCT VFR BLD CALC: 26.8 % — LOW (ref 39–50)
HGB BLD-MCNC: 8.2 G/DL — LOW (ref 13–17)
MAGNESIUM SERPL-MCNC: 2.2 MG/DL — SIGNIFICANT CHANGE UP (ref 1.6–2.6)
MCHC RBC-ENTMCNC: 28.6 PG — SIGNIFICANT CHANGE UP (ref 27–34)
MCHC RBC-ENTMCNC: 30.8 GM/DL — LOW (ref 32–36)
MCV RBC AUTO: 92.8 FL — SIGNIFICANT CHANGE UP (ref 80–100)
PLATELET # BLD AUTO: 242 K/UL — SIGNIFICANT CHANGE UP (ref 150–400)
POTASSIUM SERPL-MCNC: 4.2 MMOL/L — SIGNIFICANT CHANGE UP (ref 3.5–5.3)
POTASSIUM SERPL-SCNC: 4.2 MMOL/L — SIGNIFICANT CHANGE UP (ref 3.5–5.3)
RBC # BLD: 2.89 M/UL — LOW (ref 4.2–5.8)
RBC # FLD: 14.7 % — HIGH (ref 10.3–14.5)
SODIUM SERPL-SCNC: 141 MMOL/L — SIGNIFICANT CHANGE UP (ref 135–145)
SPECIMEN SOURCE: SIGNIFICANT CHANGE UP
SPECIMEN SOURCE: SIGNIFICANT CHANGE UP
WBC # BLD: 6 K/UL — SIGNIFICANT CHANGE UP (ref 3.8–10.5)
WBC # FLD AUTO: 6 K/UL — SIGNIFICANT CHANGE UP (ref 3.8–10.5)

## 2018-03-06 PROCEDURE — 76770 US EXAM ABDO BACK WALL COMP: CPT | Mod: 26

## 2018-03-06 RX ORDER — TAMSULOSIN HYDROCHLORIDE 0.4 MG/1
0.4 CAPSULE ORAL AT BEDTIME
Qty: 0 | Refills: 0 | Status: DISCONTINUED | OUTPATIENT
Start: 2018-03-06 | End: 2018-03-07

## 2018-03-06 RX ADMIN — LORATADINE 10 MILLIGRAM(S): 10 TABLET ORAL at 11:22

## 2018-03-06 RX ADMIN — HEPARIN SODIUM 5000 UNIT(S): 5000 INJECTION INTRAVENOUS; SUBCUTANEOUS at 06:51

## 2018-03-06 RX ADMIN — GABAPENTIN 300 MILLIGRAM(S): 400 CAPSULE ORAL at 06:51

## 2018-03-06 RX ADMIN — PANTOPRAZOLE SODIUM 40 MILLIGRAM(S): 20 TABLET, DELAYED RELEASE ORAL at 06:51

## 2018-03-06 RX ADMIN — TAMSULOSIN HYDROCHLORIDE 0.4 MILLIGRAM(S): 0.4 CAPSULE ORAL at 21:28

## 2018-03-06 RX ADMIN — AZITHROMYCIN 255 MILLIGRAM(S): 500 TABLET, FILM COATED ORAL at 17:21

## 2018-03-06 RX ADMIN — TRAMADOL HYDROCHLORIDE 25 MILLIGRAM(S): 50 TABLET ORAL at 17:17

## 2018-03-06 RX ADMIN — Medication 50 MILLIGRAM(S): at 06:51

## 2018-03-06 RX ADMIN — Medication 8 MILLIGRAM(S): at 21:27

## 2018-03-06 RX ADMIN — Medication 25 MILLIGRAM(S): at 17:18

## 2018-03-06 RX ADMIN — Medication 325 MILLIGRAM(S): at 11:22

## 2018-03-06 RX ADMIN — CEFTRIAXONE 1000 MILLIGRAM(S): 500 INJECTION, POWDER, FOR SOLUTION INTRAMUSCULAR; INTRAVENOUS at 17:19

## 2018-03-06 RX ADMIN — Medication 1000 UNIT(S): at 11:22

## 2018-03-06 RX ADMIN — Medication 81 MILLIGRAM(S): at 11:22

## 2018-03-06 RX ADMIN — Medication 25 MILLIGRAM(S): at 06:51

## 2018-03-06 RX ADMIN — HEPARIN SODIUM 5000 UNIT(S): 5000 INJECTION INTRAVENOUS; SUBCUTANEOUS at 21:28

## 2018-03-06 RX ADMIN — Medication 0.5 MILLIGRAM(S): at 08:09

## 2018-03-06 RX ADMIN — HEPARIN SODIUM 5000 UNIT(S): 5000 INJECTION INTRAVENOUS; SUBCUTANEOUS at 13:40

## 2018-03-06 RX ADMIN — Medication 100 MILLIGRAM(S): at 11:22

## 2018-03-06 RX ADMIN — TRAMADOL HYDROCHLORIDE 25 MILLIGRAM(S): 50 TABLET ORAL at 06:55

## 2018-03-06 RX ADMIN — Medication 0.5 MILLIGRAM(S): at 20:05

## 2018-03-06 RX ADMIN — SIMVASTATIN 10 MILLIGRAM(S): 20 TABLET, FILM COATED ORAL at 21:28

## 2018-03-06 RX ADMIN — GABAPENTIN 300 MILLIGRAM(S): 400 CAPSULE ORAL at 17:18

## 2018-03-06 RX ADMIN — AMLODIPINE BESYLATE 5 MILLIGRAM(S): 2.5 TABLET ORAL at 06:51

## 2018-03-06 RX ADMIN — ISOSORBIDE MONONITRATE 120 MILLIGRAM(S): 60 TABLET, EXTENDED RELEASE ORAL at 11:22

## 2018-03-06 NOTE — PROGRESS NOTE ADULT - ASSESSMENT
82 y/o wm with hx of ckd stage 3 ( 1.5-1.7) presents with focal weakness ( LUE) currently being evaluated for acute CVA noted with ARYA/ckd with RUL PNA.  AOCD with hx of GI bleed and with LLE venous stasis r/o OM.    PLAN  - PO fluids  - continue with lasix po  - fu h/h trend. since anemia related to gi blood loss in past, slowly, will monitor off LETICIA agents  - Neuro work-up on going, await MRI  - tachycardia, fu EKG  - FU MRI of LE    3/3 MK  - ARYA/CKD with variability:  will hold lasix and monitor until establish adequate po intake.  fu uop pattern  - metabolic acidosis: with lactate neg, will fu trend  - PNA; on abx  - LUE weakness: neuro input noted, PT eval  - anemia; fu h.h    3/4 MK  - ARYA/CKD increasign, check bladder scan, trial of ivf   off lasix  - metabolic acidosis: fu trend  - LUE weakness: pt emg as outpt  - anemia: fu trend of h/h  dw dr hernandez  - LE venous stasis: no OM on MRI    3/5 MK  - ARYA/CKD improving with ivf, will dec rate and continue. no evidence of retention  - metabolic acidosis: fu trend  - LUE weakness.  Left radial neuropathy PT and splint.  outpt emg  - Anemia: fu trend  - le venous stasis: no om on mri    3/6  CKD   Creat cont to improve  Abd exam show  bladder fullness, although PVR reported <50 ml yesterday  Bladder scan showed >999 ml UO  hoffman placed 900 ml UO, will follow labs

## 2018-03-06 NOTE — PROGRESS NOTE ADULT - ASSESSMENT
83 year old male with history of CAD s/p stents (LAD, RCA bare metal around 9/16), PVD s/p Right BKA, A.Fib (not on AC), Hypertension, COPD on home O2 2L, SHAYY,  Chronic diastolic CHF, Hyperlipidemia, PVD, Iron deficiency anemia, Chronic back pain, Gout, BPH, CKD III . Hx of GI bleed, RLE and RUE DVTs sent in from [facility] with weakness in LUE.     1. LUE weakness- CTH negative. Neuro following. MRI/MRA head/neck- negative. MRI negative for acute findings. 2D echo pending- echo from 6/17- reviewed. Continue ASA and statin. PT eval. Cont tele monitoring. Will need NCS/EMG as well.     2. RUL PNA- cont abx as per primary team. Cx pending.     3. LLE with chronic stasis changes and base of heal ulcer- abx. Follow with Dr. Henry as outpt- vascular following. No OM on MRI.    4. Diastolic HF- appears euvolemic. Cont medical mgmt with BP control. Cr mildly improved to 2.3 this AM. Will hold on addtional lasix for now in setting of PNA and ARYA. Renal consult pending.    5. Anemia- Hgb now 8.2 today. H/o GI bleed. Follow H/H and transfuse as needed to keep Hgb > 8. Would transfuse 1 unit today.    6. CAD s/p PCI- cont medical mgmt with asa/bbl/statin. No ace/arb with worsening ARYA (Cr 2.5 today).    7. PAF- not on LTA with GI bleed hx and anemia.     8. HTN- well controlled at present. Cont current meds.     9. DVT proph, replete lytes as needed.

## 2018-03-06 NOTE — PROGRESS NOTE ADULT - SUBJECTIVE AND OBJECTIVE BOX
See dictated consult    Pt with multiple comorbidities admitted with Left side weakness, left wrist drop and hand most prominent features.  CT and MRI head/neck were negative for acute infarct or bleed.    Pt with weakness in the distribution of the median, radial, and ulnar nerves.  Radial nerve most affected.  Not clear how much is volitional.  Elbow flexion and extension 5/5, supination 4-/5.  AIN 3+/5, APB 4-/5, FDI 4-/5.  No sensory changes.  wrist/finger extension 0/5.      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  Pt did not give hx of pain preceding onset of weakness however, multiple nerves affected  - Pt place din cock-up wrist splint  - Recommend OT eval to work on passive motion and prevent stiffness  - Issue likely neuropraxic and will resolve spontaneously, may take several months  - If no improvement, recommend EMG/NCS in 5-6 wks as oupt  - May f/u in the office [phone]  - Can consider a short course of steroids

## 2018-03-06 NOTE — PROGRESS NOTE ADULT - ASSESSMENT
82 year old Man with pmhx as above presented to  ED, sent in from [facility] with c/o weakness in LUE and slumping over to his left side which started around 7:45 the morning of his arrival    LUE weakness / pain  - strength improving / dropped wrist is less d  - CTH negative   - MRI/MRA head/neck - negative  - appreciate neurology input  - ongoing PT / L hand splint  - stop HS tramdol as he is mostly drowsy during the day / con't PRN tramdol / neurontin bid     RUL PNA- on cxr  - low grade temp overnight (most recent 99.9 at midnight)  -  WBC wnl  - con't ceftriaxone and azithromycin    Chronic HFpEF  - appears compensated   - con't hydralazine / Imdur / asa   - lasix held for worsening renal function  - TTE reviewed  - cardio consult input noted    CAD / PAD / DVT  - hx PCI  - chest pain free  -  con't asa / BB / statin   - had IVC filter placed last  encounter    LLE with chronic stasis   - LLE dsg with ace wrap in place  - f/b vascular  - dsg change per vascular    pAFib  - Tele sinus with occasional PVC's  - con't metoprolol 25 mg bid  - not on anticoag d/t recurrent GIB    HTN  - con't above meds  - monitor per protocol    ARYA on CKD stage 3 w/urinary retention  - creatinine up trending  - has also been following closely with renal Dr. Tomas for ARYA on CKD and for hyperkalemia  -  crt last encounter was 1.92 at discharge on 9/14/17 was 1.48; chart review notable for crt 1.92 in 12/2017  - lasix on hold  - bladder scan with >999ml (3/6/18) --> Dr Sanabria aware  - urology consult placed    Anemia  - hx GIB last encounter requiring PRBC's (~3 units)  - occult stool negative   - suspect he's at his baseline  -  H/H 8.3 on adm and he has been hanging around 7.7-> 7.8 (was 10.1 in Dec 2017)  - GI consult (Dr Hernández) if he continues to downtrend     BPH w/ urinary retention  - start flomax (3/6/18)  - continue doxazosin    Dispo  - full code  - aim for discharge in 24 - 48 hrs if ok with urology  - Dtr Anamika at bedside aware of plan of care 82 year old Man with pmhx as above presented to  ED, sent in from [facility] with c/o weakness in LUE and slumping over to his left side which started around 7:45 the morning of his arrival    LUE weakness / pain  - strength improving / dropped wrist is less d  - CTH negative   - MRI/MRA head/neck - negative  - appreciate neurology input  - ongoing PT / L hand splint  - stop HS tramdol as he is mostly drowsy during the day / con't PRN tramdol / neurontin bid     RUL PNA- on cxr  - low grade temp overnight (most recent 99.9 at midnight)  -  WBC wnl  - con't ceftriaxone and azithromycin    Chronic HFpEF  - appears compensated   - con't hydralazine / Imdur / asa   - lasix held for worsening renal function  - TTE reviewed  - cardio consult input noted    CAD / PAD / DVT  - hx PCI  - chest pain free  -  con't asa / BB / statin   - had IVC filter placed last  encounter    LLE with chronic stasis   - LLE dsg with ace wrap in place  - f/b vascular  - dsg change per vascular    pAFib  - Tele sinus with occasional PVC's  - con't metoprolol 25 mg bid  - not on anticoag d/t recurrent GIB    HTN  - con't above meds  - monitor per protocol    ARYA on CKD stage 3 w/urinary retention  - creatinine up trending  - has also been following closely with renal Dr. Tomas for ARYA on CKD and for hyperkalemia  -  crt last encounter was 1.92 at discharge on 9/14/17 was 1.48; chart review notable for crt 1.92 in 12/2017  - lasix on hold  - bladder scan with >999ml (3/6/18) --> Dr Sanabria aware  - urology consult placed  - bladder scan q6h if >300 cc post-void would need Feldman and o/p follow up with Dr. Garcia d/w over the phone    Anemia  - hx GIB last encounter requiring PRBC's (~3 units)  - occult stool negative   - suspect he's at his baseline  -  H/H 8.3 on adm and he has been hanging around 7.7-> 7.8 (was 10.1 in Dec 2017)  - GI consult (Dr Hernández) if he continues to downtrend     BPH w/ urinary retention  - start flomax (3/6/18)  - continue doxazosin    Dispo  - full code  - aim for discharge in 24 - 48 hrs if ok with urology  - Dtr Anamika at bedside aware of plan of care

## 2018-03-06 NOTE — PROGRESS NOTE ADULT - SUBJECTIVE AND OBJECTIVE BOX
Cardiology Progress Note    HPI: 83 year old male with history of CAD s/p stents (LAD, RCA bare metal around 9/16), PVD s/p Right BKA, A.Fib (not on AC), Hypertension, COPD on home O2 2L, SHAYY,  Chronic diastolic CHF, Hyperlipidemia, PVD, Iron deficiency anemia, Chronic back pain, Gout, BPH, CKD III . Hx of GI bleed, RLE and RUE DVTs sent in from [facility] with weakness in LUE and slumping over to his left side which started around 745 this morning. He arrived to the ED around 1030. Code stroke was called and CT head negative for acute CVA. Pt not a candidate for TPA. Currently pt resting comfortably. Complains of chronic back pain. Continues to have weakness in LUE. No headaches, changes in vision, dizziness, chest pain, palpitations, SOB, abd pain, N/V, diarrhea, cough. Pt has been following Dr. Mccracken for LLE cellulitis and was recently taken off abx, he saw a wound care specialist and is pending an MRI of the left foot to r/o OM. He has also been following closely with renal Dr. Tomas for ARYA on CKD and for hyperkalemia for which he received Kayexalate one day prior to admission.  Pt also noted dark stools secondary to iron supplements and was scheduled to follow up with GI. (01 Mar 2018 16:33)    3/3- No CP/SOB. No fevers. Lying flat in bed. ST/SR on tele.     3/4- Case d/w daughter at beside. Continues to have LUE weakness. No CP/SOB. +Fevers last pm (101). SR/STach on tele.     3/5- No events last pm. No change in LUE weakness. No CP/SOB.     3/6- No CP/SOB. No events last pm. SR on tele.     PAST MEDICAL & SURGICAL HISTORY:  Diastolic CHF  Peripheral vascular disease  Afib  Anemia  CKD (chronic kidney disease)  COPD (chronic obstructive pulmonary disease)  SHAYY (obstructive sleep apnea)  Sepsis, due to unspecified organism: 2/2 poorly healing wounds b/l  Dyspepsia: On moderate exertion.  Sleep apnea, obstructive: Requires home 02 therapy, and treatment with BIPAP  Atelectasis  Pleural effusion, bilateral  Respiratory failure  Peripheral edema  CRI (chronic renal insufficiency)  Gout  Benign prostatic hypertrophy  Spinal stenosis  Hypercholesterolemia  GERD (gastroesophageal reflux disease)  CAD (coronary artery disease)  Hypertension  S/P angioplasty with stent  Cataract of left eye  Prostate: Surgery green light procedure.  S/P rotator cuff surgery: Right  S/P angioplasty    Allergies  Zosyn (Rash)    SOCIAL HISTORY: Denies tobacco, etoh abuse or illicit drug use    FAMILY HISTORY: No pertinent family history in first degree relatives    MEDICATIONS  (STANDING):  allopurinol 100 milliGRAM(s) Oral daily  amLODIPine   Tablet 5 milliGRAM(s) Oral daily  aspirin enteric coated 81 milliGRAM(s) Oral daily  azithromycin  IVPB 500 milliGRAM(s) IV Intermittent every 24 hours  buDESOnide   0.5 milliGRAM(s) Respule 0.5 milliGRAM(s) Inhalation two times a day  cefTRIAXone Injectable. 1000 milliGRAM(s) IV Push every 24 hours  cholecalciferol 1000 Unit(s) Oral daily  doxazosin 8 milliGRAM(s) Oral at bedtime  ferrous    sulfate 325 milliGRAM(s) Oral daily  furosemide    Tablet 40 milliGRAM(s) Oral daily  gabapentin 300 milliGRAM(s) Oral two times a day  heparin  Injectable 5000 Unit(s) SubCutaneous every 8 hours  hydrALAZINE 50 milliGRAM(s) Oral daily  isosorbide   mononitrate ER Tablet (IMDUR) 120 milliGRAM(s) Oral daily  loratadine 10 milliGRAM(s) Oral daily  metoprolol     tartrate 25 milliGRAM(s) Oral two times a day  pantoprazole    Tablet 40 milliGRAM(s) Oral before breakfast  simvastatin 10 milliGRAM(s) Oral at bedtime  traMADol 50 milliGRAM(s) Oral at bedtime    MEDICATIONS  (PRN):  acetaminophen   Tablet 650 milliGRAM(s) Oral every 6 hours PRN For Temp greater than 38 C (100.4 F)  ondansetron Injectable 4 milliGRAM(s) IV Push every 6 hours PRN Nausea  traMADol 25 milliGRAM(s) Oral two times a day PRN Moderate Pain (4 - 6)      Vital Signs Last 24 Hrs  T(C): 36.4 (02 Mar 2018 05:08), Max: 38.2 (01 Mar 2018 11:05)  T(F): 97.5 (02 Mar 2018 05:08), Max: 100.8 (01 Mar 2018 11:05)  HR: 118 (02 Mar 2018 05:08) (101 - 118)  BP: 131/45 (02 Mar 2018 05:08) (123/39 - 135/36)  BP(mean): --  RR: 18 (02 Mar 2018 05:08) (18 - 20)  SpO2: 96% (02 Mar 2018 05:08) (96% - 99%)    REVIEW OF SYSTEMS:    CONSTITUTIONAL:  As per HPI.  HEENT:  Eyes:  No diplopia or blurred vision. ENT:  No earache, sore throat or runny nose.  CARDIOVASCULAR:  No pressure, squeezing, strangling, tightness, heaviness or aching about the chest, neck, axilla or epigastrium.  RESPIRATORY:  No cough, shortness of breath, PND or orthopnea.  GASTROINTESTINAL:  No nausea, vomiting or diarrhea.  GENITOURINARY:  No dysuria, frequency or urgency.  MUSCULOSKELETAL:  As per HPI.  SKIN:  No change in skin, hair or nails.  NEUROLOGIC:  No paresthesias, fasciculations, seizures or weakness.    PHYSICAL EXAMINATION:    GENERAL APPEARANCE:  Pt. is not currently dyspneic, in no distress. Pt. is alert, oriented, and pleasant.  HEENT:  Pupils are normal and react normally. No icterus. Mucous membranes well colored.  NECK:  Supple. No lymphadenopathy. Jugular venous pressure not elevated. Carotids equal.   HEART:   The cardiac impulse has a normal quality. There are no murmurs, rubs or gallops noted  CHEST:  Chest is clear to auscultation. Normal respiratory effort.  ABDOMEN:  Soft and nontender.   EXTREMITIES:  There is no edema.     I&O's Summary      LABS:                        8.3    10.0  )-----------( 251      ( 01 Mar 2018 10:46 )             26.6     03-01    143  |  115<H>  |  82<H>  ----------------------------<  114<H>  5.1   |  18<L>  |  2.29<H>    Ca    7.9<L>      01 Mar 2018 10:46    TPro  5.8<L>  /  Alb  1.9<L>  /  TBili  0.2  /  DBili  x   /  AST  58<H>  /  ALT  75  /  AlkPhos  62  03-01    LIVER FUNCTIONS - ( 01 Mar 2018 10:46 )  Alb: 1.9 g/dL / Pro: 5.8 gm/dL / ALK PHOS: 62 U/L / ALT: 75 U/L / AST: 58 U/L / GGT: x           PT/INR - ( 01 Mar 2018 10:46 )   PT: 12.1 sec;   INR: 1.12 ratio         PTT - ( 01 Mar 2018 10:46 )  PTT:32.3 sec  CARDIAC MARKERS ( 01 Mar 2018 13:37 )  0.022 ng/mL / x     / x     / x     / x      CARDIAC MARKERS ( 01 Mar 2018 10:46 )  0.021 ng/mL / x     / x     / x     / x        EKG:   < from: 12 Lead ECG (03.01.18 @ 10:32) >  Sinus tachycardia  Otherwise normal ECG    < end of copied text >    TELEMETRY: SR    CARDIAC TESTS:   < from: Transthoracic Echocardiogram (06.19.17 @ 14:13) >  Fibrocalcific changes noted to the mitral valve leaflets with preserved   leaflet excursion.   Moderate (2+) mitral regurgitation is present.   Mild mitral annular calcification is present.   Fibrocalcific changes noted to the aortic valveleaflets with preserved   excursion.   Normal tricuspid valve structure and function.   Mild (1+) tricuspid valve regurgitation is present.   Normal pulmonic valve structure and function.   Trace to Mild pulmonic valvular regurgitation is present.   The left atrium appears mildly dilated.   The left ventricle is normal in size, wall motion and contractility.   Mild concentric left ventricular hypertrophy is present.   Estimated left ventricular ejection fraction is 55-60%.   Normal right atrium.   Normal right ventricle structure and function.   No evidence of pericardial effusion.   No evidence of pleural effusion.    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 Mild   periventricular white matter ischemia is< from: MR Head No Cont (03.02.18 @ 21:54) >  IMPRESSION:         No acute infarction or other acute intrinsic pathology.  Chronic   changes, as   above.  	    ASSESSMENT & PLAN:

## 2018-03-06 NOTE — PROGRESS NOTE ADULT - SUBJECTIVE AND OBJECTIVE BOX
NEPHROLOGY INTERVAL HPI/OVERNIGHT EVENTS:  Doing well, no new complaints  feels better today  Bladder scan with 48cc    3/6  d/w daughter   pt ready for dc  creat stable  daughter stated baseline creat <2      MEDICATIONS  (STANDING):  allopurinol 100 milliGRAM(s) Oral daily  amLODIPine   Tablet 5 milliGRAM(s) Oral daily  aspirin enteric coated 81 milliGRAM(s) Oral daily  azithromycin  IVPB 500 milliGRAM(s) IV Intermittent every 24 hours  buDESOnide   0.5 milliGRAM(s) Respule 0.5 milliGRAM(s) Inhalation two times a day  cefTRIAXone Injectable. 1000 milliGRAM(s) IV Push every 24 hours  cholecalciferol 1000 Unit(s) Oral daily  doxazosin 8 milliGRAM(s) Oral at bedtime  ferrous    sulfate 325 milliGRAM(s) Oral daily  gabapentin 300 milliGRAM(s) Oral two times a day  heparin  Injectable 5000 Unit(s) SubCutaneous every 8 hours  hydrALAZINE 50 milliGRAM(s) Oral daily  isosorbide   mononitrate ER Tablet (IMDUR) 120 milliGRAM(s) Oral daily  metoprolol     tartrate 25 milliGRAM(s) Oral two times a day  pantoprazole    Tablet 40 milliGRAM(s) Oral before breakfast  simvastatin 10 milliGRAM(s) Oral at bedtime  sodium chloride 0.45%. 1000 milliLiter(s) (50 mL/Hr) IV Continuous <Continuous>  tamsulosin 0.4 milliGRAM(s) Oral at bedtime    MEDICATIONS  (PRN):  acetaminophen   Tablet 650 milliGRAM(s) Oral every 6 hours PRN For Temp greater than 38 C (100.4 F)  ondansetron Injectable 4 milliGRAM(s) IV Push every 6 hours PRN Nausea  traMADol 25 milliGRAM(s) Oral two times a day PRN Moderate Pain (4 - 6)        Allergies    Zosyn (Rash)    Intolerances            I&O's Detail    06 Mar 2018 07:01  -  06 Mar 2018 16:28  --------------------------------------------------------  IN:  Total IN: 0 mL    OUT:    Intermittent Catheterization - Urethral: 900 mL  Total OUT: 900 mL    Total NET: -900 mL    Vital Signs Last 24 Hrs  T(C): 36.7 (06 Mar 2018 11:01), Max: 36.7 (06 Mar 2018 11:01)  T(F): 98 (06 Mar 2018 11:01), Max: 98 (06 Mar 2018 11:01)  HR: 94 (06 Mar 2018 11:01) (91 - 101)  BP: 130/45 (06 Mar 2018 11:01) (115/38 - 132/48)  BP(mean): --  RR: 18 (06 Mar 2018 11:01) (18 - 18)  SpO2: 97% (06 Mar 2018 11:) (93% - 99%)        Vital Signs Last 24 Hrs  T(C): 36.9 (05 Mar 2018 04:44), Max: 38.3 (04 Mar 2018 22:20)  T(F): 98.5 (05 Mar 2018 04:44), Max: 100.9 (04 Mar 2018 22:20)  HR: 96 (05 Mar 2018 04:44) (96 - 108)  BP: 128/46 (05 Mar 2018 04:44) (128/46 - 133/42)  BP(mean): 64 (04 Mar 2018 17:31) (64 - 64)  RR: 18 (05 Mar 2018 04:44) (18 - 18)  SpO2: 95% (05 Mar 2018 04:44) (90% - 95%)  Daily     Daily Weight in k.7 (05 Mar 2018 04:44)    PHYSICAL EXAM:  General: alert. awake Ox3  HEENT: MMM  CV: s1s2 rrr  LUNGS: B/L CTA  Bd: ++ s/p fullness on palpation  EXT: no edema    LABS:                 141  |  112<H>  |  90<H>  ----------------------------<  100<H>  4.2   |  20<L>  |  2.30<H>    Ca    8.5      06 Mar 2018 05:48  Mg     2.2                                 8.2    6.0   )-----------( 242      ( 06 Mar 2018 05:48 )             26.8

## 2018-03-06 NOTE — PROGRESS NOTE ADULT - SUBJECTIVE AND OBJECTIVE BOX
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 Pt presented with c/o weakness    HOSPITAL COURSE  Subjective: has lower abd pain, otherwise "ok"; no CP/palpitations/HA/dizziness/SOB     3/6/18 BLADDER scan >999ml (post voiding/ heavily saturated linens); straight cath with 900cc out      Review of system- Rest of the review of system are negative except mentioned in HPI    Vital Signs Last 24 Hrs  T(C): 36.7 (06 Mar 2018 11:01), Max: 36.7 (06 Mar 2018 11:01)  T(F): 98 (06 Mar 2018 11:01), Max: 98 (06 Mar 2018 11:01)  HR: 94 (06 Mar 2018 11:01) (91 - 101)  BP: 130/45 (06 Mar 2018 11:01) (115/38 - 132/48)  BP(mean): --  RR: 18 (06 Mar 2018 11:01) (18 - 18)  SpO2: 97% (06 Mar 2018 11:01) (93% - 99%)      PHYSICAL EXAM:  HEENT:  pupils equal and reactive, EOMI, tongue midline, facial symmetry, no oropharyngeal lesions, erythema, exudates, oral thrush  NECK:   supple, no carotid bruits, no palpable lymph nodes, no thyromegaly  CV:  +S1, +S2, RRR  RESP:   lungs clear to auscultation bilaterally, no wheezing, rales, rhonchi, good air entry bilaterally  GI:  abdomen soft, +tenderness to hypogastric area, +distention, normal BS, no bruits, no abdominal masses, no palpable masses, stable umbilical hernia  EXT:   no clubbing, no cyanosis, no edema, no calf pain, swelling or erythema  VASCULAR:  +radial pulses; R BKA, LLE with ace wrap dressing  NEURO/MSK  AAOX3, follows all commands, able to move extremities spontaneously; L wrist drop,  SKIN:  LLE vascular stasis wound, otherwise skin intact    LABS: all labs reviewed                        8.2    6.0   )-----------( 242      ( 06 Mar 2018 05:48 )             26.8     03-06    141  |  112<H>  |  90<H>  ----------------------------<  100<H>  4.2   |  20<L>  |  2.30<H>    Ca    8.5      06 Mar 2018 05:48  Mg     2.2     03-06       MEDICATIONS  (STANDING):  allopurinol 100 milliGRAM(s) Oral daily  amLODIPine   Tablet 5 milliGRAM(s) Oral daily  aspirin enteric coated 81 milliGRAM(s) Oral daily  azithromycin  IVPB 500 milliGRAM(s) IV Intermittent every 24 hours  buDESOnide   0.5 milliGRAM(s) Respule 0.5 milliGRAM(s) Inhalation two times a day  cefTRIAXone Injectable. 1000 milliGRAM(s) IV Push every 24 hours  cholecalciferol 1000 Unit(s) Oral daily  doxazosin 8 milliGRAM(s) Oral at bedtime  ferrous    sulfate 325 milliGRAM(s) Oral daily  gabapentin 300 milliGRAM(s) Oral two times a day  heparin  Injectable 5000 Unit(s) SubCutaneous every 8 hours  hydrALAZINE 50 milliGRAM(s) Oral daily  isosorbide   mononitrate ER Tablet (IMDUR) 120 milliGRAM(s) Oral daily  metoprolol     tartrate 25 milliGRAM(s) Oral two times a day  pantoprazole    Tablet 40 milliGRAM(s) Oral before breakfast  simvastatin 10 milliGRAM(s) Oral at bedtime  sodium chloride 0.45%. 1000 milliLiter(s) (50 mL/Hr) IV Continuous <Continuous>  tamsulosin 0.4 milliGRAM(s) Oral at bedtime    MEDICATIONS  (PRN):  acetaminophen   Tablet 650 milliGRAM(s) Oral every 6 hours PRN For Temp greater than 38 C (100.4 F)  ondansetron Injectable 4 milliGRAM(s) IV Push every 6 hours PRN Nausea  traMADol 25 milliGRAM(s) Oral two times a day PRN Moderate Pain (4 - 6)      < from: Transthoracic Echocardiogram (03.02.18 @ 11:10) >  Summary     Mild septal left ventricular hypertrophy is present. Left ventricular   wall  motion is normal.The left ventricle is normal in size. Estimated left   ventricular ejection fraction is 60 %.   The aortic valve is well visualized, appears normal. Valve opening seems   to be normal. No aortic regurgitation is present.   The mitral valve was well visualized. The mitral valve leaflets appear   thin and normal. Mild to moderate mitral regurgitation is present.      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 Mild   periventricular white matter ischemia is noted.    < end of copied text >    < from: MR Angio Head No Cont (03.02.18 @ 21:57) >  IMPRESSION:         Normal head/brain MRA.    < end of copied text >    < from: MR Angio Neck No Cont (03.02.18 @ 21:58) >    IMPRESSION:     Normal study.      < end of copied text >    < from: MR Foot No Cont, Left (03.02.18 @ 21:59) >  IMPRESSION: Cutaneous ulceration along the posterior aspect of the   calcaneus. No osteomyelitis.    < end of copied text >

## 2018-03-07 ENCOUNTER — TRANSCRIPTION ENCOUNTER (OUTPATIENT)
Age: 83
End: 2018-03-07

## 2018-03-07 LAB
ANION GAP SERPL CALC-SCNC: 10 MMOL/L — SIGNIFICANT CHANGE UP (ref 5–17)
APPEARANCE UR: CLEAR — SIGNIFICANT CHANGE UP
BACTERIA # UR AUTO: (no result)
BASE EXCESS BLDA CALC-SCNC: -5.9 MMOL/L — LOW (ref -2–2)
BILIRUB UR-MCNC: NEGATIVE — SIGNIFICANT CHANGE UP
BLOOD GAS COMMENTS ARTERIAL: SIGNIFICANT CHANGE UP
BUN SERPL-MCNC: 88 MG/DL — HIGH (ref 7–23)
CALCIUM SERPL-MCNC: 8.3 MG/DL — LOW (ref 8.5–10.1)
CHLORIDE SERPL-SCNC: 116 MMOL/L — HIGH (ref 96–108)
CO2 SERPL-SCNC: 19 MMOL/L — LOW (ref 22–31)
COLOR SPEC: YELLOW — SIGNIFICANT CHANGE UP
CREAT SERPL-MCNC: 1.85 MG/DL — HIGH (ref 0.5–1.3)
DIFF PNL FLD: NEGATIVE — SIGNIFICANT CHANGE UP
EPI CELLS # UR: NEGATIVE — SIGNIFICANT CHANGE UP
FERRITIN SERPL-MCNC: 573 NG/ML — HIGH (ref 30–400)
FOLATE SERPL-MCNC: 10 NG/ML — SIGNIFICANT CHANGE UP (ref 4.8–24.2)
GAS PNL BLDA: SIGNIFICANT CHANGE UP
GLUCOSE BLDC GLUCOMTR-MCNC: 112 MG/DL — HIGH (ref 70–99)
GLUCOSE SERPL-MCNC: 103 MG/DL — HIGH (ref 70–99)
GLUCOSE UR QL: NEGATIVE MG/DL — SIGNIFICANT CHANGE UP
HCO3 BLDA-SCNC: 19 MMOL/L — LOW (ref 21–29)
HCT VFR BLD CALC: 26.1 % — LOW (ref 39–50)
HGB BLD-MCNC: 8 G/DL — LOW (ref 13–17)
IRON SATN MFR SERPL: 19 % — SIGNIFICANT CHANGE UP (ref 16–55)
IRON SATN MFR SERPL: 27 UG/DL — LOW (ref 45–165)
KETONES UR-MCNC: NEGATIVE — SIGNIFICANT CHANGE UP
LEUKOCYTE ESTERASE UR-ACNC: (no result)
MAGNESIUM SERPL-MCNC: 2.1 MG/DL — SIGNIFICANT CHANGE UP (ref 1.6–2.6)
MCHC RBC-ENTMCNC: 28.9 PG — SIGNIFICANT CHANGE UP (ref 27–34)
MCHC RBC-ENTMCNC: 30.7 GM/DL — LOW (ref 32–36)
MCV RBC AUTO: 94.2 FL — SIGNIFICANT CHANGE UP (ref 80–100)
NITRITE UR-MCNC: NEGATIVE — SIGNIFICANT CHANGE UP
NRBC # BLD: 0 /100 WBCS — SIGNIFICANT CHANGE UP (ref 0–0)
PCO2 BLDA: 35 MMHG — SIGNIFICANT CHANGE UP (ref 32–46)
PH BLDA: 7.34 — LOW (ref 7.35–7.45)
PH UR: 5 — SIGNIFICANT CHANGE UP (ref 5–8)
PLATELET # BLD AUTO: 236 K/UL — SIGNIFICANT CHANGE UP (ref 150–400)
PO2 BLDA: 105 MMHG — SIGNIFICANT CHANGE UP (ref 74–108)
POTASSIUM SERPL-MCNC: 4.2 MMOL/L — SIGNIFICANT CHANGE UP (ref 3.5–5.3)
POTASSIUM SERPL-SCNC: 4.2 MMOL/L — SIGNIFICANT CHANGE UP (ref 3.5–5.3)
PROT UR-MCNC: 15 MG/DL
RBC # BLD: 2.77 M/UL — LOW (ref 4.2–5.8)
RBC # FLD: 14.5 % — SIGNIFICANT CHANGE UP (ref 10.3–14.5)
RBC CASTS # UR COMP ASSIST: NEGATIVE /HPF — SIGNIFICANT CHANGE UP (ref 0–4)
SAO2 % BLDA: 98 % — HIGH (ref 92–96)
SODIUM SERPL-SCNC: 145 MMOL/L — SIGNIFICANT CHANGE UP (ref 135–145)
SP GR SPEC: 1.01 — SIGNIFICANT CHANGE UP (ref 1.01–1.02)
TIBC SERPL-MCNC: 145 UG/DL — LOW (ref 220–430)
UIBC SERPL-MCNC: 118 UG/DL — SIGNIFICANT CHANGE UP (ref 110–370)
UROBILINOGEN FLD QL: NEGATIVE MG/DL — SIGNIFICANT CHANGE UP
VIT B12 SERPL-MCNC: 690 PG/ML — SIGNIFICANT CHANGE UP (ref 232–1245)
WBC # BLD: 5.77 K/UL — SIGNIFICANT CHANGE UP (ref 3.8–10.5)
WBC # FLD AUTO: 5.77 K/UL — SIGNIFICANT CHANGE UP (ref 3.8–10.5)
WBC UR QL: SIGNIFICANT CHANGE UP

## 2018-03-07 PROCEDURE — 71045 X-RAY EXAM CHEST 1 VIEW: CPT | Mod: 26

## 2018-03-07 PROCEDURE — 74176 CT ABD & PELVIS W/O CONTRAST: CPT | Mod: 26

## 2018-03-07 RX ORDER — TRAMADOL HYDROCHLORIDE 50 MG/1
0.5 TABLET ORAL
Qty: 0 | Refills: 0 | COMMUNITY
Start: 2018-03-07

## 2018-03-07 RX ORDER — DOCUSATE SODIUM 100 MG
100 CAPSULE ORAL
Qty: 0 | Refills: 0 | Status: DISCONTINUED | OUTPATIENT
Start: 2018-03-07 | End: 2018-03-08

## 2018-03-07 RX ORDER — SENNA PLUS 8.6 MG/1
2 TABLET ORAL AT BEDTIME
Qty: 0 | Refills: 0 | Status: DISCONTINUED | OUTPATIENT
Start: 2018-03-07 | End: 2018-03-08

## 2018-03-07 RX ORDER — TAMSULOSIN HYDROCHLORIDE 0.4 MG/1
1 CAPSULE ORAL
Qty: 0 | Refills: 0 | COMMUNITY
Start: 2018-03-07

## 2018-03-07 RX ORDER — FINASTERIDE 5 MG/1
5 TABLET, FILM COATED ORAL DAILY
Qty: 0 | Refills: 0 | Status: DISCONTINUED | OUTPATIENT
Start: 2018-03-07 | End: 2018-03-08

## 2018-03-07 RX ORDER — ACETAMINOPHEN 500 MG
650 TABLET ORAL EVERY 8 HOURS
Qty: 0 | Refills: 0 | Status: DISCONTINUED | OUTPATIENT
Start: 2018-03-07 | End: 2018-03-08

## 2018-03-07 RX ORDER — DUTASTERIDE 0.5 MG/1
1 CAPSULE, LIQUID FILLED ORAL
Qty: 30 | Refills: 0 | OUTPATIENT
Start: 2018-03-07 | End: 2018-04-05

## 2018-03-07 RX ORDER — TRAMADOL HYDROCHLORIDE 50 MG/1
25 TABLET ORAL
Qty: 0 | Refills: 0 | Status: DISCONTINUED | OUTPATIENT
Start: 2018-03-07 | End: 2018-03-08

## 2018-03-07 RX ORDER — GABAPENTIN 400 MG/1
200 CAPSULE ORAL
Qty: 0 | Refills: 0 | Status: DISCONTINUED | OUTPATIENT
Start: 2018-03-08 | End: 2018-03-08

## 2018-03-07 RX ORDER — CEFUROXIME AXETIL 250 MG
1 TABLET ORAL
Qty: 6 | Refills: 0 | OUTPATIENT
Start: 2018-03-07 | End: 2018-03-09

## 2018-03-07 RX ORDER — SIMVASTATIN 20 MG/1
1 TABLET, FILM COATED ORAL
Qty: 0 | Refills: 0 | COMMUNITY
Start: 2018-03-07

## 2018-03-07 RX ORDER — TRAMADOL HYDROCHLORIDE 50 MG/1
0.5 TABLET ORAL
Qty: 0 | Refills: 0 | COMMUNITY

## 2018-03-07 RX ORDER — LEVOCETIRIZINE DIHYDROCHLORIDE 0.5 MG/ML
1 SOLUTION ORAL
Qty: 0 | Refills: 0 | COMMUNITY

## 2018-03-07 RX ORDER — ISOSORBIDE MONONITRATE 60 MG/1
1 TABLET, EXTENDED RELEASE ORAL
Qty: 0 | Refills: 0 | COMMUNITY
Start: 2018-03-07

## 2018-03-07 RX ORDER — TRAMADOL HYDROCHLORIDE 50 MG/1
1 TABLET ORAL
Qty: 0 | Refills: 0 | COMMUNITY

## 2018-03-07 RX ADMIN — ISOSORBIDE MONONITRATE 120 MILLIGRAM(S): 60 TABLET, EXTENDED RELEASE ORAL at 14:05

## 2018-03-07 RX ADMIN — HEPARIN SODIUM 5000 UNIT(S): 5000 INJECTION INTRAVENOUS; SUBCUTANEOUS at 21:00

## 2018-03-07 RX ADMIN — Medication 325 MILLIGRAM(S): at 14:06

## 2018-03-07 RX ADMIN — SIMVASTATIN 10 MILLIGRAM(S): 20 TABLET, FILM COATED ORAL at 20:58

## 2018-03-07 RX ADMIN — GABAPENTIN 300 MILLIGRAM(S): 400 CAPSULE ORAL at 05:36

## 2018-03-07 RX ADMIN — Medication 25 MILLIGRAM(S): at 05:35

## 2018-03-07 RX ADMIN — Medication 25 MILLIGRAM(S): at 17:15

## 2018-03-07 RX ADMIN — Medication 100 MILLIGRAM(S): at 14:04

## 2018-03-07 RX ADMIN — HEPARIN SODIUM 5000 UNIT(S): 5000 INJECTION INTRAVENOUS; SUBCUTANEOUS at 14:06

## 2018-03-07 RX ADMIN — Medication 1000 UNIT(S): at 14:05

## 2018-03-07 RX ADMIN — HEPARIN SODIUM 5000 UNIT(S): 5000 INJECTION INTRAVENOUS; SUBCUTANEOUS at 05:37

## 2018-03-07 RX ADMIN — PANTOPRAZOLE SODIUM 40 MILLIGRAM(S): 20 TABLET, DELAYED RELEASE ORAL at 05:36

## 2018-03-07 RX ADMIN — Medication 8 MILLIGRAM(S): at 20:58

## 2018-03-07 RX ADMIN — AZITHROMYCIN 255 MILLIGRAM(S): 500 TABLET, FILM COATED ORAL at 18:29

## 2018-03-07 RX ADMIN — Medication 81 MILLIGRAM(S): at 14:04

## 2018-03-07 RX ADMIN — Medication 0.5 MILLIGRAM(S): at 07:47

## 2018-03-07 RX ADMIN — Medication 50 MILLIGRAM(S): at 05:36

## 2018-03-07 RX ADMIN — Medication 650 MILLIGRAM(S): at 20:58

## 2018-03-07 RX ADMIN — SENNA PLUS 2 TABLET(S): 8.6 TABLET ORAL at 20:58

## 2018-03-07 RX ADMIN — Medication 100 MILLIGRAM(S): at 20:59

## 2018-03-07 RX ADMIN — AMLODIPINE BESYLATE 5 MILLIGRAM(S): 2.5 TABLET ORAL at 05:35

## 2018-03-07 RX ADMIN — CEFTRIAXONE 1000 MILLIGRAM(S): 500 INJECTION, POWDER, FOR SOLUTION INTRAMUSCULAR; INTRAVENOUS at 18:29

## 2018-03-07 RX ADMIN — Medication 0.5 MILLIGRAM(S): at 20:23

## 2018-03-07 NOTE — DISCHARGE NOTE ADULT - CARE PLAN
Principal Discharge DX:	HCAP (healthcare-associated pneumonia)  Goal:	resolution of infectious process  Assessment and plan of treatment:	take your antibiotic as prescribe  Secondary Diagnosis:	Persistent atrial fibrillation  Goal:	stable cardiac function  Assessment and plan of treatment:	Take your medications as prescribe  report feelings of palpitations/dizziness to your health care provider  Goal:	stable  Assessment and plan of treatment:	follow  Secondary Diagnosis:	Chronic diastolic congestive heart failure  Goal:	volume control  Assessment and plan of treatment:	weigh yourself daily, report weight gain of 2-3 pounds or more within 24-48 hours to your health care provider  no added salt, 1.5 liter fluid restriction diet  Secondary Diagnosis:	Left wrist drop  Goal:	resolution of left hand weakness  Assessment and plan of treatment:	follow up with your PCP for outpt EMG  Secondary Diagnosis:	Chronic kidney disease, unspecified CKD stage  Goal:	optimal renal function  Assessment and plan of treatment:	follow up with your nephrologist in 2 weeks; please call and make the appointment even you are at rehab.  Secondary Diagnosis:	Retention of urine  Goal:	regain of spontaneous urination  Assessment and plan of treatment:	Follow up with urology in 1 week for voiding trial / reassessment of urinary retention

## 2018-03-07 NOTE — PROGRESS NOTE ADULT - ASSESSMENT
82 year old Man with pmhx as above presented to  ED, sent in from [facility] with c/o weakness in LUE and slumping over to his left side which started around 7:45 the morning of his arrival    LUE weakness / pain  - strength improving / dropped wrist is less d  - CTH negative   - MRI/MRA head/neck - negative  - appreciate neurology input  - ongoing PT / L hand splint  - stop HS tramdol as he is mostly drowsy during the day / con't PRN tramdol / neurontin bid     RUL PNA- on cxr  - low grade temp overnight (most recent 99.9 at midnight)  -  WBC wnl  - con't ceftriaxone and azithromycin    Chronic HFpEF  - appears compensated   - con't hydralazine / Imdur / asa   - lasix held for worsening renal function  - TTE reviewed  - cardio consult input noted    CAD / PAD / DVT  - hx PCI  - chest pain free  -  con't asa / BB / statin   - had IVC filter placed last  encounter    LLE with chronic stasis   - LLE dsg with ace wrap in place  - f/b vascular  - dsg change per vascular    pAFib  - Tele sinus with occasional PVC's  - con't metoprolol 25 mg bid  - not on anticoag d/t recurrent GIB    HTN  - con't above meds  - monitor per protocol    ARYA on CKD stage 3 w/urinary retention  - creatinine up trending  - has also been following closely with renal Dr. Tomas for ARYA on CKD and for hyperkalemia  -  crt last encounter was 1.92 at discharge on 9/14/17 was 1.48; chart review notable for crt 1.92 in 12/2017  - lasix on hold  - bladder scan with >999ml s/p Feldman  - urology consult  - s/p Feldman 3/7/18 o/p follow up with Dr. Garcia d/w over the phone    Anemia  - hx GIB last encounter requiring PRBC's (~3 units)  - occult stool negative   - suspect he's at his baseline  -  H/H 8.3 on adm and he has been hanging around 7.7-> 7.8 (was 10.1 in Dec 2017)  - GI consult (Dr Hernández) if he continues to downtrend     BPH w/ urinary retention  - start flomax (3/6/18)  - continue doxazosin    Lethargy likely hypoactive delirium with underlying dementia r/o new acute infections  - CXR  - CT abd to r/o any acute pathology  - inpatient monitoring    Dispo  - full code  - aim for discharge in 24 - 48 hrs if ok with urology  - Dtr Anamika at bedside aware of plan of care

## 2018-03-07 NOTE — PROGRESS NOTE ADULT - SUBJECTIVE AND OBJECTIVE BOX
NEPHROLOGY INTERVAL HPI/OVERNIGHT EVENTS:  3/7 SY  Pt's daughter at bedside.  Pt has been somnolent all day.  Unable to eat lunch.  Pt unarousable.  Opens eyes but no verbal response.    3/6  d/w daughter   pt ready for dc  creat stable  daughter stated baseline creat <2    HPI:  82 y/o wm with hx of ckd stage 3 ( 1.5-1.7) presents with left sided weakness that started at 7:45 yesterday morning.  In ER evaluated for acute CVA and not candidate for tpa due to time.  Recently with cr upto 2.7 and lasix was dc and with k of 5.9.  given kayexelate one day prior to admission.  Noted with PNA.  Today s/p echo and await MRI/MRA to further evaluate possible CVA.  CT head neg for acute changes.      Has hx of Dieulafoy clipped in past now with variable anemia.  OCB neg upon arrival  Followed by Dr Stephenson of GI.  Scheduled for MRI of LE that is followed by Dr Clement and seeing hyperbaric wound specialist     Appetite fair and tolerating po without any n/v/d.  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,    MEDICATIONS  (STANDING):  allopurinol 100 milliGRAM(s) Oral daily  amLODIPine   Tablet 5 milliGRAM(s) Oral daily  aspirin enteric coated 81 milliGRAM(s) Oral daily  azithromycin  IVPB 500 milliGRAM(s) IV Intermittent every 24 hours  buDESOnide   0.5 milliGRAM(s) Respule 0.5 milliGRAM(s) Inhalation two times a day  cefTRIAXone Injectable. 1000 milliGRAM(s) IV Push every 24 hours  cholecalciferol 1000 Unit(s) Oral daily  doxazosin 8 milliGRAM(s) Oral at bedtime  ferrous    sulfate 325 milliGRAM(s) Oral daily  heparin  Injectable 5000 Unit(s) SubCutaneous every 8 hours  hydrALAZINE 50 milliGRAM(s) Oral daily  isosorbide   mononitrate ER Tablet (IMDUR) 120 milliGRAM(s) Oral daily  metoprolol     tartrate 25 milliGRAM(s) Oral two times a day  pantoprazole    Tablet 40 milliGRAM(s) Oral before breakfast  simvastatin 10 milliGRAM(s) Oral at bedtime  sodium chloride 0.45%. 1000 milliLiter(s) (50 mL/Hr) IV Continuous <Continuous>  tamsulosin 0.4 milliGRAM(s) Oral at bedtime    MEDICATIONS  (PRN):  acetaminophen   Tablet 650 milliGRAM(s) Oral every 6 hours PRN For Temp greater than 38 C (100.4 F)  ondansetron Injectable 4 milliGRAM(s) IV Push every 6 hours PRN Nausea  traMADol 25 milliGRAM(s) Oral two times a day PRN Moderate Pain (4 - 6)          Vital Signs Last 24 Hrs  T(C): 36.7 (07 Mar 2018 11:26), Max: 36.7 (06 Mar 2018 17:15)  T(F): 98.1 (07 Mar 2018 11:26), Max: 98.1 (06 Mar 2018 17:15)  HR: 93 (07 Mar 2018 11:26) (93 - 102)  BP: 130/50 (07 Mar 2018 11:26) (130/47 - 145/48)  BP(mean): 71 (06 Mar 2018 17:15) (71 - 71)  RR: 18 (07 Mar 2018 11:26) (18 - 18)  SpO2: 95% (07 Mar 2018 11:26) (95% - 99%)  Daily     Daily Weight in k.8 (07 Mar 2018 05:33)     @ 07:  -   @ 07:00  --------------------------------------------------------  IN: 0 mL / OUT: 900 mL / NET: -900 mL     @ 07:01  -   @ 13:14  --------------------------------------------------------  IN: 160 mL / OUT: 0 mL / NET: 160 mL        PHYSICAL EXAM:  Drowsy.   minimal response.  GENERAL: no acute distress  CHEST/LUNG: grossly clear  HEART: S1S2 RRR  ABDOMEN: soft   EXTREMITIES: no edema  SKIN:     LABS:                        8.0    5.77  )-----------( 236      ( 07 Mar 2018 06:05 )             26.1         145  |  116<H>  |  88<H>  ----------------------------<  103<H>  4.2   |  19<L>  |  1.85<H>    Ca    8.3<L>      07 Mar 2018 06:05  Mg     2.1             Urinalysis Basic - ( 07 Mar 2018 12:21 )    Color: Yellow / Appearance: Clear / S.010 / pH: x  Gluc: x / Ketone: Negative  / Bili: Negative / Urobili: Negative mg/dL   Blood: x / Protein: 15 mg/dL / Nitrite: Negative   Leuk Esterase: Trace / RBC: x / WBC x   Sq Epi: x / Non Sq Epi: x / Bacteria: x      Magnesium, Serum: 2.1 mg/dL ( @ 06:05)          RADIOLOGY & ADDITIONAL TESTS:

## 2018-03-07 NOTE — DISCHARGE NOTE ADULT - PATIENT PORTAL LINK FT
You can access the [facility] Portal, offered by [facility], by registering with the following website: [email or web address]

## 2018-03-07 NOTE — PROGRESS NOTE ADULT - ASSESSMENT
83 year old male with history of CAD s/p stents (LAD, RCA bare metal around 9/16), PVD s/p Right BKA, A.Fib (not on AC), Hypertension, COPD on home O2 2L, SHAYY,  Chronic diastolic CHF, Hyperlipidemia, PVD, Iron deficiency anemia, Chronic back pain, Gout, BPH, CKD III . Hx of GI bleed, RLE and RUE DVTs sent in from [facility] with weakness in LUE.     1. LUE weakness- CTH negative. Neuro following. MRI/MRA head/neck- negative. MRI negative for acute findings. 2D echo pending- echo from 6/17- reviewed. Continue ASA and statin. PT eval. Cont tele monitoring. Will need NCS/EMG as well.     2. RUL PNA- cont abx as per primary team. Cx pending.     3. LLE with chronic stasis changes and base of heal ulcer- abx. Follow with Dr. Henry as outpt- vascular following. No OM on MRI.    4. Diastolic HF- appears euvolemic. Cont medical mgmt with BP control. Cr mildly improved to 1.85 this AM. Will hold on addtional lasix for now in setting of PNA and ARYA. Renal following.    5. Anemia- Hgb now 8.0 today. Has been fairly stable. H/o GI bleed. Follow H/H and transfuse as needed to keep Hgb > 8. Would transfuse 1 unit today.    6. CAD s/p PCI- cont medical mgmt with asa/bbl/statin. No ace/arb for now.    7. PAF- not on LTA with GI bleed hx and anemia.     8. HTN- well controlled at present. Cont current meds.     9. DVT proph, replete lytes as needed.

## 2018-03-07 NOTE — DISCHARGE NOTE ADULT - CARE PROVIDERS DIRECT ADDRESSES
,[email or web address],DirectAddress_Unknown,DirectAddress_Unknown ,[email or web address],DirectAddress_Unknown,DirectAddress_Unknown,DirectAddress_Unknown

## 2018-03-07 NOTE — PROGRESS NOTE ADULT - SUBJECTIVE AND OBJECTIVE BOX
Cardiology Progress Note    HPI: 83 year old male with history of CAD s/p stents (LAD, RCA bare metal around 9/16), PVD s/p Right BKA, A.Fib (not on AC), Hypertension, COPD on home O2 2L, SHAYY,  Chronic diastolic CHF, Hyperlipidemia, PVD, Iron deficiency anemia, Chronic back pain, Gout, BPH, CKD III . Hx of GI bleed, RLE and RUE DVTs sent in from [facility] with weakness in LUE and slumping over to his left side which started around 745 this morning. He arrived to the ED around 1030. Code stroke was called and CT head negative for acute CVA. Pt not a candidate for TPA. Currently pt resting comfortably. Complains of chronic back pain. Continues to have weakness in LUE. No headaches, changes in vision, dizziness, chest pain, palpitations, SOB, abd pain, N/V, diarrhea, cough. Pt has been following Dr. Mccracken for LLE cellulitis and was recently taken off abx, he saw a wound care specialist and is pending an MRI of the left foot to r/o OM. He has also been following closely with renal Dr. Tomas for ARYA on CKD and for hyperkalemia for which he received Kayexalate one day prior to admission.  Pt also noted dark stools secondary to iron supplements and was scheduled to follow up with GI. (01 Mar 2018 16:33)    3/3- No CP/SOB. No fevers. Lying flat in bed. ST/SR on tele.     3/4- Case d/w daughter at beside. Continues to have LUE weakness. No CP/SOB. +Fevers last pm (101). SR/STach on tele.     3/5- No events last pm. No change in LUE weakness. No CP/SOB.     3/6- No CP/SOB. No events last pm. SR on tele.     3/7- Low grade fevers last pm. No CP/SOB.     PAST MEDICAL & SURGICAL HISTORY:  Diastolic CHF  Peripheral vascular disease  Afib  Anemia  CKD (chronic kidney disease)  COPD (chronic obstructive pulmonary disease)  SHAYY (obstructive sleep apnea)  Sepsis, due to unspecified organism: 2/2 poorly healing wounds b/l  Dyspepsia: On moderate exertion.  Sleep apnea, obstructive: Requires home 02 therapy, and treatment with BIPAP  Atelectasis  Pleural effusion, bilateral  Respiratory failure  Peripheral edema  CRI (chronic renal insufficiency)  Gout  Benign prostatic hypertrophy  Spinal stenosis  Hypercholesterolemia  GERD (gastroesophageal reflux disease)  CAD (coronary artery disease)  Hypertension  S/P angioplasty with stent  Cataract of left eye  Prostate: Surgery green light procedure.  S/P rotator cuff surgery: Right  S/P angioplasty    Allergies  Zosyn (Rash)    SOCIAL HISTORY: Denies tobacco, etoh abuse or illicit drug use    FAMILY HISTORY: No pertinent family history in first degree relatives    MEDICATIONS  (STANDING):  allopurinol 100 milliGRAM(s) Oral daily  amLODIPine   Tablet 5 milliGRAM(s) Oral daily  aspirin enteric coated 81 milliGRAM(s) Oral daily  azithromycin  IVPB 500 milliGRAM(s) IV Intermittent every 24 hours  buDESOnide   0.5 milliGRAM(s) Respule 0.5 milliGRAM(s) Inhalation two times a day  cefTRIAXone Injectable. 1000 milliGRAM(s) IV Push every 24 hours  cholecalciferol 1000 Unit(s) Oral daily  doxazosin 8 milliGRAM(s) Oral at bedtime  ferrous    sulfate 325 milliGRAM(s) Oral daily  furosemide    Tablet 40 milliGRAM(s) Oral daily  gabapentin 300 milliGRAM(s) Oral two times a day  heparin  Injectable 5000 Unit(s) SubCutaneous every 8 hours  hydrALAZINE 50 milliGRAM(s) Oral daily  isosorbide   mononitrate ER Tablet (IMDUR) 120 milliGRAM(s) Oral daily  loratadine 10 milliGRAM(s) Oral daily  metoprolol     tartrate 25 milliGRAM(s) Oral two times a day  pantoprazole    Tablet 40 milliGRAM(s) Oral before breakfast  simvastatin 10 milliGRAM(s) Oral at bedtime  traMADol 50 milliGRAM(s) Oral at bedtime    MEDICATIONS  (PRN):  acetaminophen   Tablet 650 milliGRAM(s) Oral every 6 hours PRN For Temp greater than 38 C (100.4 F)  ondansetron Injectable 4 milliGRAM(s) IV Push every 6 hours PRN Nausea  traMADol 25 milliGRAM(s) Oral two times a day PRN Moderate Pain (4 - 6)      Vital Signs Last 24 Hrs  T(C): 36.4 (02 Mar 2018 05:08), Max: 38.2 (01 Mar 2018 11:05)  T(F): 97.5 (02 Mar 2018 05:08), Max: 100.8 (01 Mar 2018 11:05)  HR: 118 (02 Mar 2018 05:08) (101 - 118)  BP: 131/45 (02 Mar 2018 05:08) (123/39 - 135/36)  BP(mean): --  RR: 18 (02 Mar 2018 05:08) (18 - 20)  SpO2: 96% (02 Mar 2018 05:08) (96% - 99%)    REVIEW OF SYSTEMS:    CONSTITUTIONAL:  As per HPI.  HEENT:  Eyes:  No diplopia or blurred vision. ENT:  No earache, sore throat or runny nose.  CARDIOVASCULAR:  No pressure, squeezing, strangling, tightness, heaviness or aching about the chest, neck, axilla or epigastrium.  RESPIRATORY:  No cough, shortness of breath, PND or orthopnea.  GASTROINTESTINAL:  No nausea, vomiting or diarrhea.  GENITOURINARY:  No dysuria, frequency or urgency.  MUSCULOSKELETAL:  As per HPI.  SKIN:  No change in skin, hair or nails.  NEUROLOGIC:  No paresthesias, fasciculations, seizures or weakness.    PHYSICAL EXAMINATION:    GENERAL APPEARANCE:  Pt. is not currently dyspneic, in no distress. Pt. is alert, oriented, and pleasant.  HEENT:  Pupils are normal and react normally. No icterus. Mucous membranes well colored.  NECK:  Supple. No lymphadenopathy. Jugular venous pressure not elevated. Carotids equal.   HEART:   The cardiac impulse has a normal quality. There are no murmurs, rubs or gallops noted  CHEST:  Chest is clear to auscultation. Normal respiratory effort.  ABDOMEN:  Soft and nontender.   EXTREMITIES:  There is no edema.     I&O's Summary      LABS:                        8.3    10.0  )-----------( 251      ( 01 Mar 2018 10:46 )             26.6     03-01    143  |  115<H>  |  82<H>  ----------------------------<  114<H>  5.1   |  18<L>  |  2.29<H>    Ca    7.9<L>      01 Mar 2018 10:46    TPro  5.8<L>  /  Alb  1.9<L>  /  TBili  0.2  /  DBili  x   /  AST  58<H>  /  ALT  75  /  AlkPhos  62  03-01    LIVER FUNCTIONS - ( 01 Mar 2018 10:46 )  Alb: 1.9 g/dL / Pro: 5.8 gm/dL / ALK PHOS: 62 U/L / ALT: 75 U/L / AST: 58 U/L / GGT: x           PT/INR - ( 01 Mar 2018 10:46 )   PT: 12.1 sec;   INR: 1.12 ratio         PTT - ( 01 Mar 2018 10:46 )  PTT:32.3 sec  CARDIAC MARKERS ( 01 Mar 2018 13:37 )  0.022 ng/mL / x     / x     / x     / x      CARDIAC MARKERS ( 01 Mar 2018 10:46 )  0.021 ng/mL / x     / x     / x     / x        EKG:   < from: 12 Lead ECG (03.01.18 @ 10:32) >  Sinus tachycardia  Otherwise normal ECG    < end of copied text >    TELEMETRY: SR    CARDIAC TESTS:   < from: Transthoracic Echocardiogram (06.19.17 @ 14:13) >  Fibrocalcific changes noted to the mitral valve leaflets with preserved   leaflet excursion.   Moderate (2+) mitral regurgitation is present.   Mild mitral annular calcification is present.   Fibrocalcific changes noted to the aortic valveleaflets with preserved   excursion.   Normal tricuspid valve structure and function.   Mild (1+) tricuspid valve regurgitation is present.   Normal pulmonic valve structure and function.   Trace to Mild pulmonic valvular regurgitation is present.   The left atrium appears mildly dilated.   The left ventricle is normal in size, wall motion and contractility.   Mild concentric left ventricular hypertrophy is present.   Estimated left ventricular ejection fraction is 55-60%.   Normal right atrium.   Normal right ventricle structure and function.   No evidence of pericardial effusion.   No evidence of pleural effusion.    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 Mild   periventricular white matter ischemia is< from: MR Head No Cont (03.02.18 @ 21:54) >  IMPRESSION:         No acute infarction or other acute intrinsic pathology.  Chronic   changes, as   above.  	    ASSESSMENT & PLAN:

## 2018-03-07 NOTE — DISCHARGE NOTE ADULT - CARE PROVIDER_API CALL
Michele Gardiner), Urology  [address] [place] [address]  Phone: [phone]  Fax: [phone]    Rancho Sanabria (DO), Nephrology  [address] [facility]  [address] [place] [address]  Phone: [phone]  Fax: [phone]    Jason Clement), Vascular Surgery  [address] [facility] [place] [address]  Phone: [phone]  Fax: [phone] Michele Gardiner), Urology  [address] [place] [address]  Phone: [phone]  Fax: [phone]    Rancho Sanabria (), Nephrology  [address] [facility]  [address]  [place] [address]  Phone: [phone]  Fax: [phone]    Jason Clement), Vascular Surgery  [address] [facility] [place] [address]  Phone: [phone]  Fax: [phone]    Yevgeniy Masters), Family Medicine  [address] [facility]  [address]  [place] [address]  Phone: [phone]  Fax: [phone]

## 2018-03-07 NOTE — DISCHARGE NOTE ADULT - MEDICATION SUMMARY - MEDICATIONS TO TAKE
I will START or STAY ON the medications listed below when I get home from the hospital:    budesonide 0.5 mg/2 mL inhalation suspension  -- 2 milliliter(s) inhaled 2 times a day  -- Indication: For HCAP (healthcare-associated pneumonia)    acetaminophen 325 mg oral tablet  -- 2 tab(s) by mouth every 8 hours, As Needed -for temp>100.5 or body aches/headaches  -- Indication: For pain    aspirin 81 mg oral tablet, chewable  -- 1 tab(s) by mouth once a day  -- Indication: For preventative    traMADol 50 mg oral tablet  -- 0.5 tab(s) by mouth 2 times a day, As needed, Moderate Pain (4 - 6)  -- Indication: For pain    tamsulosin 0.4 mg oral capsule  -- 1 cap(s) by mouth once a day (at bedtime)  -- Indication: For BPH with urinary retention    doxazosin 8 mg oral tablet  -- 1 tab(s) by mouth once a day (at bedtime)  -- Indication: For BPH    isosorbide mononitrate 120 mg oral tablet, extended release  -- 1 tab(s) by mouth once a day  -- Indication: For blood pressure    allopurinol 100 mg oral tablet  -- 1 tab(s) by mouth once a day  -- Indication: For gout    simvastatin 10 mg oral tablet  -- 1 tab(s) by mouth once a day (at bedtime)  -- Indication: For Cholesterol    metoprolol tartrate 25 mg oral tablet  -- 1 tab(s) by mouth 2 times a day  -- Indication: For blood pressure    amLODIPine 5 mg oral tablet  -- 1 tab(s) by mouth once a day  -- Indication: For blood pressure    cefuroxime 500 mg oral tablet  -- 1 tab(s) by mouth 2 times a day   -- Finish all this medication unless otherwise directed by prescriber.  Medication should be taken with plenty of water.  Take with food or milk.    -- Indication: For pneumonia    ferrous sulfate 325 mg (65 mg elemental iron) oral delayed release tablet  -- 1 tab(s) by mouth once a day  -- Indication: For anemia    pantoprazole 40 mg oral delayed release tablet  -- 1 tab(s) by mouth once a day  -- Indication: For GI bleed    hydrALAZINE 50 mg oral tablet  -- 1 tab(s) by mouth once a day  -- Indication: For High blood pressure / heart failure    Vitamin D3 1000 intl units oral capsule  -- 1 cap(s) by mouth once a day  -- Indication: For supplement / preventative I will START or STAY ON the medications listed below when I get home from the hospital:    Avodart 0.5 mg oral capsule  -- 1 cap(s) by mouth once a day (at bedtime)   -- Do not take this drug if you are pregnant.    -- Indication: For Retention of urine    budesonide 0.5 mg/2 mL inhalation suspension  -- 2 milliliter(s) inhaled 2 times a day  -- Indication: For HCAP (healthcare-associated pneumonia)    acetaminophen 325 mg oral tablet  -- 2 tab(s) by mouth every 8 hours, As Needed -for temp>100.5 or body aches/headaches  -- Indication: For pain    aspirin 81 mg oral tablet, chewable  -- 1 tab(s) by mouth once a day  -- Indication: For preventative    traMADol 50 mg oral tablet  -- 0.5 tab(s) by mouth 2 times a day, As needed, Moderate Pain (4 - 6)  -- Indication: For pain    doxazosin 8 mg oral tablet  -- 1 tab(s) by mouth once a day (at bedtime)  -- Indication: For BPH    isosorbide mononitrate 120 mg oral tablet, extended release  -- 1 tab(s) by mouth once a day  -- Indication: For blood pressure    allopurinol 100 mg oral tablet  -- 1 tab(s) by mouth once a day  -- Indication: For gout    simvastatin 10 mg oral tablet  -- 1 tab(s) by mouth once a day (at bedtime)  -- Indication: For Cholesterol    metoprolol tartrate 25 mg oral tablet  -- 1 tab(s) by mouth 2 times a day  -- Indication: For blood pressure    amLODIPine 5 mg oral tablet  -- 1 tab(s) by mouth once a day  -- Indication: For blood pressure    cefuroxime 500 mg oral tablet  -- 1 tab(s) by mouth 2 times a day   -- Finish all this medication unless otherwise directed by prescriber.  Medication should be taken with plenty of water.  Take with food or milk.    -- Indication: For pneumonia    ferrous sulfate 325 mg (65 mg elemental iron) oral delayed release tablet  -- 1 tab(s) by mouth once a day  -- Indication: For anemia    pantoprazole 40 mg oral delayed release tablet  -- 1 tab(s) by mouth once a day  -- Indication: For GI bleed    hydrALAZINE 50 mg oral tablet  -- 1 tab(s) by mouth once a day  -- Indication: For High blood pressure / heart failure    Vitamin D3 1000 intl units oral capsule  -- 1 cap(s) by mouth once a day  -- Indication: For supplement / preventative I will START or STAY ON the medications listed below when I get home from the hospital:    Avodart 0.5 mg oral capsule  -- 1 cap(s) by mouth once a day (at bedtime)   -- Do not take this drug if you are pregnant.    -- Indication: For Retention of urine    budesonide 0.5 mg/2 mL inhalation suspension  -- 2 milliliter(s) inhaled 2 times a day  -- Indication: For HCAP (healthcare-associated pneumonia)    acetaminophen 325 mg oral tablet  -- 2 tab(s) by mouth every 8 hours, As Needed -for temp>100.5 or body aches/headaches  -- Indication: For pain    aspirin 81 mg oral tablet, chewable  -- 1 tab(s) by mouth once a day  -- Indication: For preventative    traMADol 50 mg oral tablet  -- 0.5 tab(s) by mouth 2 times a day, As needed, Moderate Pain (4 - 6)  -- Indication: For pain    doxazosin 8 mg oral tablet  -- 1 tab(s) by mouth once a day (at bedtime)  -- Indication: For BPH    isosorbide mononitrate 120 mg oral tablet, extended release  -- 1 tab(s) by mouth once a day  -- Indication: For blood pressure    allopurinol 100 mg oral tablet  -- 1 tab(s) by mouth once a day  -- Indication: For gout    simvastatin 10 mg oral tablet  -- 1 tab(s) by mouth once a day (at bedtime)  -- Indication: For Cholesterol    metoprolol tartrate 25 mg oral tablet  -- 1 tab(s) by mouth 2 times a day  -- Indication: For blood pressure    amLODIPine 5 mg oral tablet  -- 1 tab(s) by mouth once a day  -- Indication: For blood pressure    cefuroxime 500 mg oral tablet  -- 1 tab(s) by mouth 2 times a day   -- Finish all this medication unless otherwise directed by prescriber.  Medication should be taken with plenty of water.  Take with food or milk.    -- Indication: For pneumonia    epoetin nelly  -- 8000 unit(s) subcutaneous Tuesday, Thursday, Saturday  -- Indication: For Anemia in chronic kidney disease, unspecified CKD stage    ferrous sulfate 325 mg (65 mg elemental iron) oral delayed release tablet  -- 1 tab(s) by mouth once a day  -- Indication: For anemia    senna oral tablet  -- 2 tab(s) by mouth once a day (at bedtime)  -- Indication: For Constipation    docusate sodium 100 mg oral capsule  -- 1 cap(s) by mouth 2 times a day  -- Indication: For Constipation    pantoprazole 40 mg oral delayed release tablet  -- 1 tab(s) by mouth once a day  -- Indication: For GI bleed    hydrALAZINE 50 mg oral tablet  -- 1 tab(s) by mouth once a day  -- Indication: For High blood pressure / heart failure    Vitamin D3 1000 intl units oral capsule  -- 1 cap(s) by mouth once a day  -- Indication: For supplement / preventative

## 2018-03-07 NOTE — PROGRESS NOTE ADULT - ASSESSMENT
84 y/o wm with hx of ckd stage 3 ( 1.5-1.7) presents with focal weakness ( LUE) currently being evaluated for acute CVA noted with ARYA/ckd with RUL PNA.  AOCD with hx of GI bleed and with LLE venous stasis r/o OM.    PLAN  - PO fluids  - continue with lasix po  - fu h/h trend. since anemia related to gi blood loss in past, slowly, will monitor off LETICIA agents  - Neuro work-up on going, await MRI  - tachycardia, fu EKG  - FU MRI of LE    3/3 MK  - ARYA/CKD with variability:  will hold lasix and monitor until establish adequate po intake.  fu uop pattern  - metabolic acidosis: with lactate neg, will fu trend  - PNA; on abx  - LUE weakness: neuro input noted, PT eval  - anemia; fu h.h    3/4 MK  - ARYA/CKD increasign, check bladder scan, trial of ivf   off lasix  - metabolic acidosis: fu trend  - LUE weakness: pt emg as outpt  - anemia: fu trend of h/h  dw dr hernandez  - LE venous stasis: no OM on MRI    3/5 MK  - ARYA/CKD improving with ivf, will dec rate and continue. no evidence of retention  - metabolic acidosis: fu trend  - LUE weakness.  Left radial neuropathy PT and splint.  outpt emg  - Anemia: fu trend  - le venous stasis: no om on mri    3/6  CKD   Creat cont to improve  Abd exam show  bladder fullness, although PVR reported <50 ml yesterday  Bladder scan showed >999 ml UO  hoffman placed 900 ml UO, will follow labs    3/7 SY  --ARYA/CKD ; slowly stabilizing.   now with urinary retention.   Follow urine output.  --Somnolence :  Unclear if med related.   No new meds this admission.   BP stable.  Check ABG.  D/w Dr. Howe.

## 2018-03-07 NOTE — DISCHARGE NOTE ADULT - HOSPITAL COURSE
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 Pt presented with c/o weakness    Vital Signs Last 24 Hrs  T(C): 36.7 (07 Mar 2018 11:26), Max: 36.7 (06 Mar 2018 17:15)  T(F): 98.1 (07 Mar 2018 11:26), Max: 98.1 (06 Mar 2018 17:15)  HR: 93 (07 Mar 2018 11:26) (93 - 102)  BP: 130/50 (07 Mar 2018 11:26) (130/47 - 145/48)  BP(mean): 71 (06 Mar 2018 17:15) (71 - 71)  RR: 18 (07 Mar 2018 11:26) (18 - 18)  SpO2: 95% (07 Mar 2018 11:26) (95% - 99%)    HEENT:  pupils equal and reactive, EOMI, tongue midline, facial symmetry, no oropharyngeal lesions, erythema, exudates, oral thrush  NECK:   supple, no carotid bruits, no palpable lymph nodes, no thyromegaly  CV:  +S1, +S2, RRR  RESP:   lungs clear to auscultation bilaterally, no wheezing, rales, rhonchi, good air entry bilaterally  GI:  abdomen soft, +tenderness to hypogastric area, +distention, normal BS, no bruits, no abdominal masses, no palpable masses, stable umbilical hernia  EXT:   no clubbing, no cyanosis, no edema, no calf pain, swelling or erythema  VASCULAR:  +radial pulses; R BKA, LLE with ace wrap dressing  NEURO/MSK: drowsy but much more alert today. 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  Radial nerve most affected.    - ongoing PT / L hand splint   - stop HS tramdol as he is mostly drowsy during the day / con't PRN tramdol / decrease neurontin to 200 mg bid  - EMG outpatient     RUL PNA- on cxr  - treated with ceftriaxone and azithromycin  - low grade temp resovled  -  WBC wnl and has been afebrile >72 hours    Chronic HFpEF  - appears compensated   - con't hydralazine / Imdur / asa   - lasix held for worsening renal function  - TTE reviewed  - cardio consult input noted    CAD / PAD / DVT  - hx PCI  - chest pain free  -  con't asa / BB / statin   - had IVC filter placed last HH encounter    LLE with chronic stasis   - LLE dsg with xeroform dsg    pAFib  - con't metoprolol 25 mg bid  - not on anticoag d/t recurrent GIB    HTN  - con't above meds  - monitor per protocol    ARYA on CKD stage 3 w/urinary retention  - creatinine trending down  - has also been following closely with renal Dr. Tomas for ARYA on CKD and for hyperkalemia  - lasix on hold  - bladder scan with >999ml (3/6/18) --> hoffman placed; will need outpt follow up with her urologist (dr Gardiner) and nephrologist (Dr Sanabria)even while at rehab    Anemia  - hx GIB last encounter requiring PRBC's (~3 units)  - occult stool negative   -  H/H 8.3 on adm and he has been hanging around 7.7-> 7.8  - suspect he's at his baseline  - received 1 unit PRBC's this admission for Hgb 7.7 with increase to 8.3 seen    BPH w/ urinary retention  - started flomax (3/6/18)  - continue doxazosin  - dc to rehab with hoffman cath (inserted 3/7/18) HPI: 82 year old male with history of CAD s/p stents (LAD, RCA bare metal around 9/16),PVD (RLE stent/ angioplasty and surgical debridement by Dr Siu 5/20, right lower extremity amputation ) AFib  on coumadin, HTN, COPD on home O2 2L, SHAYY on history of  nocturnal BIPAP, ex-smoker (smoked 1ppd X 50 years, quit 22 years ago),  Chronic HFpEF, dyslipidemia, PVD, Iron deficiency anemia, Chronic back pain, Gout, BPH, CKD III, GIB, 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 Pt presented with c/o weakness  3/7/18 - pt seen and examined earlier today , lower abdominal discomfort, retaining urine s/p Hoffman, denies other sx     Vital Signs Last 24 Hrs  T(C): 36.7 (07 Mar 2018 11:26), Max: 36.7 (06 Mar 2018 17:15)  T(F): 98.1 (07 Mar 2018 11:26), Max: 98.1 (06 Mar 2018 17:15)  HR: 93 (07 Mar 2018 11:26) (93 - 102)  BP: 130/50 (07 Mar 2018 11:26) (130/47 - 145/48)  BP(mean): 71 (06 Mar 2018 17:15) (71 - 71)  RR: 18 (07 Mar 2018 11:26) (18 - 18)  SpO2: 95% (07 Mar 2018 11:26) (95% - 99%)    HEENT:  pupils equal and reactive, EOMI, tongue midline, facial symmetry, no oropharyngeal lesions, erythema, exudates, oral thrush  NECK:   supple, no carotid bruits, no palpable lymph nodes, no thyromegaly  CV:  +S1, +S2, RRR  RESP:   lungs clear to auscultation bilaterally, no wheezing, rales, rhonchi, good air entry bilaterally  GI:  abdomen soft, +tenderness to hypogastric area, +distention, normal BS, no bruits, no abdominal masses, no palpable masses, stable umbilical hernia  EXT:   no clubbing, no cyanosis, no edema, no calf pain, swelling or erythema  VASCULAR:  +radial pulses; R BKA, LLE with ace wrap dressing  NEURO/MSK: drowsy but much more alert today. 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  Radial nerve most affected.    - ongoing PT / L hand splint   - stop HS tramdol as he is mostly drowsy during the day / con't PRN tramdol / decrease neurontin to 200 mg bid  - EMG outpatient   RUL PNA- on cxr  - treated with ceftriaxone and azithromycin  - low grade temp resovled  -  WBC wnl and has been afebrile >72 hours  Chronic HFpEF  - appears compensated   - con't hydralazine / Imdur / asa   - lasix held for worsening renal function  - TTE reviewed  - cardio consult input noted  CAD / PAD / DVT  - hx PCI,  chest pain free  -  con't asa / BB / statin   - had IVC filter placed last HH encounter  LLE with chronic stasis   - LLE dsg with xeroform dsg  pAFib  - con't metoprolol 25 mg bid  - not on anticoag d/t recurrent GIB  HTN  - con't above meds  - monitor per protocol  ARYA on CKD stage 3 w/urinary retention  - creatinine trending down  - has also been following closely with renal Dr. Tomas for ARYA on CKD and for hyperkalemia  - lasix on hold  - bladder scan with >999ml (3/6/18) --> hoffman placed; will need outpt follow up with her urologist (dr Gardiner) and nephrologist (Dr Sanabria)even while at rehab  Anemia  - hx GIB last encounter requiring PRBC's (~3 units)  - occult stool negative   -  H/H 8.3 on adm and he has been hanging around 7.7-> 7.8  - suspect he's at his baseline  - received 1 unit PRBC's this admission for Hgb 7.7 with increase to 8.3 seen  BPH w/ urinary retention  - started on avodart   - continue doxazosin  - dc to rehab with hoffman cath (inserted 3/7/18)  Disposition - medically optimized to be discharged  to [facility] with close follow up with PCP, cardiology, urology   complete antibiotics  return to ED if fever, abdominal pain, nausea, vomiting, chest pain, dyspnea  Discharge plan discussed with patient, RN  Patient advised to follow up with PCP within 3-7 days  time spend 40 min  called Dr. 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 Pt presented with c/o weakness  3/7/18 - pt seen and examined earlier today , lower abdominal discomfort, retaining urine s/p Hoffman, denies other sx   3/8 - large BM after lactulose, dulcolax Supp and TAP water enema, abd pain resolved; pt seen with josselyn Willson at bedside  Review of system- Rest of the review of system are negative except mentioned in HPI  T(C): 36.6 (03-08-18 @ 11:06), Max: 36.9 (03-07-18 @ 16:58)  T(F): 97.8 (03-08-18 @ 11:06), Max: 98.4 (03-07-18 @ 16:58)  HR: 87 (03-08-18 @ 11:06) (86 - 93)  BP: 152/47 (03-08-18 @ 05:23) (144/49 - 152/47)  RR: 18 (03-08-18 @ 05:23) (17 - 18)  SpO2: 98% (03-08-18 @ 05:23) (98% - 98%)  Wt(kg): --    HEENT:  pupils equal and reactive, EOMI, tongue midline, facial symmetry, no oropharyngeal lesions, erythema, exudates, oral thrush  NECK:   supple, no carotid bruits, no palpable lymph nodes, no thyromegaly  CV:  +S1, +S2, RRR  RESP:   lungs clear to auscultation bilaterally, no wheezing, rales, rhonchi, good air entry bilaterally  GI:  abdomen soft, +tenderness to hypogastric area, +distention, normal BS, no bruits, no abdominal masses, no palpable masses, stable umbilical hernia  EXT:   no clubbing, no cyanosis, no edema, no calf pain, swelling or erythema  VASCULAR:  +radial pulses; R BKA, LLE with ace wrap dressing  NEURO/MSK: drowsy but much more alert today. 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  Radial nerve most affected.    - ongoing PT / L hand splint   - stop HS tramdol as he is mostly drowsy during the day / con't PRN tramdol / decrease neurontin to 200 mg bid  - EMG outpatient   RUL PNA- on cxr  - treated with ceftriaxone and azithromycin  - low grade temp resovled  -  WBC wnl and has been afebrile >72 hours  Chronic HFpEF  - appears compensated   - con't hydralazine / Imdur / asa   - lasix held for worsening renal function  - TTE reviewed  - cardio consult input noted  CAD / PAD / DVT  - hx PCI,  chest pain free  -  con't asa / BB / statin   - had IVC filter placed last HH encounter  LLE with chronic stasis   - LLE dsg with xeroform dsg  pAFib  - con't metoprolol 25 mg bid  - not on anticoag d/t recurrent GIB  HTN  - con't above meds  - monitor per protocol  ARYA on CKD stage 3 w/urinary retention  - creatinine trending down  - has also been following closely with renal Dr. Tomas for ARYA on CKD and for hyperkalemia  - lasix on hold  - bladder scan with >999ml (3/6/18) --> hoffman placed; will need outpt follow up with her urologist (dr Gardiner) and nephrologist (Dr Sanabria)even while at rehab  Anemia  - hx GIB last encounter requiring PRBC's (~3 units)  - occult stool negative   -  H/H 8.3 on adm and he has been hanging around 7.7-> 7.8  - suspect he's at his baseline  - received 1 unit PRBC's this admission for Hgb 7.7 with increase to 8.3 seen  BPH w/ urinary retention  - started on avodart   - continue doxazosin  - dc to rehab with hoffman cath (inserted 3/7/18)  Disposition - medically optimized to be discharged  to [facility] with close follow up with PCP, cardiology, urology   complete antibiotics  return to ED if fever, abdominal pain, nausea, vomiting, chest pain, dyspnea  Discharge plan discussed with patient, RN  Patient advised to follow up with PCP within 3-7 days  time spend 40 min  called Dr. Masters office , left the message to call me back  discharge note faxed to PCP

## 2018-03-07 NOTE — DISCHARGE NOTE ADULT - PROVIDER TOKENS
TOKEN:'[id number]' TOKEN:'[id number]:'[id number]'

## 2018-03-07 NOTE — DISCHARGE NOTE ADULT - MEDICATION SUMMARY - MEDICATIONS TO STOP TAKING
I will STOP taking the medications listed below when I get home from the hospital:    furosemide 40 mg oral tablet  -- 1 tab(s) by mouth once a day    traMADol 50 mg oral tablet  -- 1 tab(s) by mouth once a day (at bedtime) I will STOP taking the medications listed below when I get home from the hospital:    furosemide 40 mg oral tablet  -- 1 tab(s) by mouth once a day    levocetirizine 5 mg oral tablet  -- 1 tab(s) by mouth once a day (in the evening)    traMADol 50 mg oral tablet  -- 1 tab(s) by mouth once a day (at bedtime)

## 2018-03-07 NOTE — DISCHARGE NOTE ADULT - ADDITIONAL INSTRUCTIONS
follow up with urology Dr Garcia in 1 week for trial of voiding for urinary retention follow up with urology Dr Garcia in 1 week for trial of voiding for urinary retention  follow up with Dr Cardenas for LLE wound care

## 2018-03-07 NOTE — DISCHARGE NOTE ADULT - PLAN OF CARE
Follow up with urology in 1 week for voiding trial / reassessment of urinary retention resolution of infectious process take your antibiotic as prescribe stable cardiac function Take your medications as prescribe  report feelings of palpitations/dizziness to your health care provider stable follow volume control weigh yourself daily, report weight gain of 2-3 pounds or more within 24-48 hours to your health care provider  no added salt, 1.5 liter fluid restriction diet resolution of left hand weakness follow up with your PCP for outpt EMG optimal renal function follow up with your nephrologist in 2 weeks; please call and make the appointment even you are at rehab. regain of spontaneous urination

## 2018-03-07 NOTE — PROGRESS NOTE ADULT - SUBJECTIVE AND OBJECTIVE BOX
Call by RN to evaluate pt for decrease response, "only responding to sternal rub by Dr Zamora"  Pt seen, became wide awake and joking with his dtr at bedside, verbal appropriately, oriented x 3; Dtr was able to start feeding patient lunch w/o difficulty    Vital Signs Last 24 Hrs  T(C): 36.7 (07 Mar 2018 11:26), Max: 36.7 (06 Mar 2018 17:15)  T(F): 98.1 (07 Mar 2018 11:26), Max: 98.1 (06 Mar 2018 17:15)  HR: 92 (07 Mar 2018 13:52) (92 - 102)  BP: 148/53 (07 Mar 2018 13:52) (130/47 - 148/53)  BP(mean): 71 (06 Mar 2018 17:15) (71 - 71)  RR: 18 (07 Mar 2018 13:52) (18 - 18)  SpO2: 100% (07 Mar 2018 13:52) (95% - 100%)

## 2018-03-07 NOTE — DISCHARGE NOTE ADULT - OTHER SIGNIFICANT FINDINGS
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 Mild   periventricular white matter ischemia is noted.    < end of copied text >  < from: MR Angio Neck No Cont (03.02.18 @ 21:58) >  IMPRESSION:     Normal study.    < end of copied text >  < from: MR Angio Head No Cont (03.02.18 @ 21:57) >  IMPRESSION:         Normal head/brain MRA.    < end of copied text >  < from: MR Foot No Cont, Left (03.02.18 @ 21:59) >  IMPRESSION: Cutaneous ulceration along the posterior aspect of the   calcaneus. No osteomyelitis.    < end of copied text >        < from: CT Abdomen and Pelvis w/ Oral Cont (05.05.17 @ 10:28) >    Preponderance of abdominal visceral adipose tissue without evidence for   obstruction, mass, or ascites.    < end of copied text >

## 2018-03-07 NOTE — PROGRESS NOTE ADULT - SUBJECTIVE AND OBJECTIVE BOX
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 Pt presented with c/o weakness    HOSPITAL COURSE  Subjective: has lower abd pain, otherwise "ok"; no CP/palpitations/HA/dizziness/SOB     3/6/18 BLADDER scan >999ml (post voiding/ heavily saturated linens); straight cath with 900cc out  3/7 - episode of lethargy, afebrile, + urinary retention, d/c canceled, denies CP, abdomen distended      Review of system- Rest of the review of system are negative except mentioned in HPI    T(C): 36.9 (18 @ 16:58), Max: 36.9 (18 @ 16:58)  T(F): 98.4 (18 @ 16:58), Max: 98.4 (18 @ 16:58)  HR: 90 (18 @ 16:58) (90 - 102)  BP: 144/49 (18 @ 16:58) (130/47 - 148/53)  RR: 17 (18 @ 16:58) (17 - 18)  SpO2: 98% (18 @ 16:58) (95% - 100%)  Wt(kg): --      PHYSICAL EXAM:  HEENT:  pupils equal and reactive, EOMI, tongue midline, facial symmetry, no oropharyngeal lesions, erythema, exudates, oral thrush  NECK:   supple, no carotid bruits, no palpable lymph nodes, no thyromegaly  CV:  +S1, +S2, RRR  RESP:   lungs clear to auscultation bilaterally, no wheezing, rales, rhonchi, good air entry bilaterally  GI:  abdomen soft, +tenderness to hypogastric area, +distention, normal BS, no bruits, no abdominal masses, no palpable masses, stable umbilical hernia  EXT:   no clubbing, no cyanosis, no edema, no calf pain, swelling or erythema  VASCULAR:  +radial pulses; R BKA, LLE with ace wrap dressing  NEURO/MSK  AAOX3, follows all commands, able to move extremities spontaneously; L wrist drop,  SKIN:  LLE vascular stasis wound, otherwise skin intact    LABS: all labs reviewed      145  |  116<H>  |  88<H>  ----------------------------<  103<H>  4.2   |  19<L>  |  1.85<H>    Ca    8.3<L>      07 Mar 2018 06:05  Mg     2.1                                 8.0    5.77  )-----------( 236      ( 07 Mar 2018 06:05 )             26.1         Urinalysis Basic - ( 07 Mar 2018 12:21 )    Color: Yellow / Appearance: Clear / S.010 / pH: x  Gluc: x / Ketone: Negative  / Bili: Negative / Urobili: Negative mg/dL   Blood: x / Protein: 15 mg/dL / Nitrite: Negative   Leuk Esterase: Trace / RBC: Negative /HPF / WBC 3-5   Sq Epi: x / Non Sq Epi: Negative / Bacteria: Few                            8.2    6.0   )-----------( 242      ( 06 Mar 2018 05:48 )             26.8     03-06    141  |  112<H>  |  90<H>  ----------------------------<  100<H>  4.2   |  20<L>  |  2.30<H>    Ca    8.5      06 Mar 2018 05:48  Mg     2.2     03-06       MEDICATIONS  (STANDING):  allopurinol 100 milliGRAM(s) Oral daily  amLODIPine   Tablet 5 milliGRAM(s) Oral daily  aspirin enteric coated 81 milliGRAM(s) Oral daily  azithromycin  IVPB 500 milliGRAM(s) IV Intermittent every 24 hours  buDESOnide   0.5 milliGRAM(s) Respule 0.5 milliGRAM(s) Inhalation two times a day  cefTRIAXone Injectable. 1000 milliGRAM(s) IV Push every 24 hours  cholecalciferol 1000 Unit(s) Oral daily  doxazosin 8 milliGRAM(s) Oral at bedtime  ferrous    sulfate 325 milliGRAM(s) Oral daily  gabapentin 300 milliGRAM(s) Oral two times a day  heparin  Injectable 5000 Unit(s) SubCutaneous every 8 hours  hydrALAZINE 50 milliGRAM(s) Oral daily  isosorbide   mononitrate ER Tablet (IMDUR) 120 milliGRAM(s) Oral daily  metoprolol     tartrate 25 milliGRAM(s) Oral two times a day  pantoprazole    Tablet 40 milliGRAM(s) Oral before breakfast  simvastatin 10 milliGRAM(s) Oral at bedtime  sodium chloride 0.45%. 1000 milliLiter(s) (50 mL/Hr) IV Continuous <Continuous>  tamsulosin 0.4 milliGRAM(s) Oral at bedtime    MEDICATIONS  (PRN):  acetaminophen   Tablet 650 milliGRAM(s) Oral every 6 hours PRN For Temp greater than 38 C (100.4 F)  ondansetron Injectable 4 milliGRAM(s) IV Push every 6 hours PRN Nausea  traMADol 25 milliGRAM(s) Oral two times a day PRN Moderate Pain (4 - 6)      < from: Transthoracic Echocardiogram (18 @ 11:10) >  Summary     Mild septal left ventricular hypertrophy is present. Left ventricular   wall  motion is normal.The left ventricle is normal in size. Estimated left   ventricular ejection fraction is 60 %.   The aortic valve is well visualized, appears normal. Valve opening seems   to be normal. No aortic regurgitation is present.   The mitral valve was well visualized. The mitral valve leaflets appear   thin and normal. Mild to moderate mitral regurgitation is present.      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 Mild   periventricular white matter ischemia is noted.    < end of copied text >    < from: MR Angio Head No Cont (18 @ 21:57) >  IMPRESSION:         Normal head/brain MRA.    < end of copied text >    < from: MR Angio Neck No Cont (18 @ 21:58) >    IMPRESSION:     Normal study.      < end of copied text >    < from: MR Foot No Cont, Left (18 @ 21:59) >  IMPRESSION: Cutaneous ulceration along the posterior aspect of the   calcaneus. No osteomyelitis.    < end of copied text >

## 2018-03-07 NOTE — DISCHARGE NOTE ADULT - SECONDARY DIAGNOSIS.
Persistent atrial fibrillation Chronic diastolic congestive heart failure Left wrist drop Chronic kidney disease, unspecified CKD stage Retention of urine

## 2018-03-08 VITALS
RESPIRATION RATE: 18 BRPM | SYSTOLIC BLOOD PRESSURE: 136 MMHG | DIASTOLIC BLOOD PRESSURE: 49 MMHG | TEMPERATURE: 98 F | HEART RATE: 81 BPM

## 2018-03-08 LAB
ANION GAP SERPL CALC-SCNC: 8 MMOL/L — SIGNIFICANT CHANGE UP (ref 5–17)
BUN SERPL-MCNC: 75 MG/DL — HIGH (ref 7–23)
CALCIUM SERPL-MCNC: 8.4 MG/DL — LOW (ref 8.5–10.1)
CHLORIDE SERPL-SCNC: 115 MMOL/L — HIGH (ref 96–108)
CO2 SERPL-SCNC: 22 MMOL/L — SIGNIFICANT CHANGE UP (ref 22–31)
CREAT SERPL-MCNC: 1.52 MG/DL — HIGH (ref 0.5–1.3)
CULTURE RESULTS: NO GROWTH — SIGNIFICANT CHANGE UP
GLUCOSE SERPL-MCNC: 96 MG/DL — SIGNIFICANT CHANGE UP (ref 70–99)
HCT VFR BLD CALC: 25.2 % — LOW (ref 39–50)
HCT VFR BLD CALC: 25.3 % — LOW (ref 39–50)
HGB BLD-MCNC: 7.6 G/DL — LOW (ref 13–17)
HGB BLD-MCNC: 7.7 G/DL — LOW (ref 13–17)
MAGNESIUM SERPL-MCNC: 2.2 MG/DL — SIGNIFICANT CHANGE UP (ref 1.6–2.6)
MCHC RBC-ENTMCNC: 28.9 PG — SIGNIFICANT CHANGE UP (ref 27–34)
MCHC RBC-ENTMCNC: 30.6 GM/DL — LOW (ref 32–36)
MCV RBC AUTO: 94.7 FL — SIGNIFICANT CHANGE UP (ref 80–100)
NRBC # BLD: 0 /100 WBCS — SIGNIFICANT CHANGE UP (ref 0–0)
PLATELET # BLD AUTO: 237 K/UL — SIGNIFICANT CHANGE UP (ref 150–400)
POTASSIUM SERPL-MCNC: 4 MMOL/L — SIGNIFICANT CHANGE UP (ref 3.5–5.3)
POTASSIUM SERPL-SCNC: 4 MMOL/L — SIGNIFICANT CHANGE UP (ref 3.5–5.3)
RBC # BLD: 2.66 M/UL — LOW (ref 4.2–5.8)
RBC # FLD: 14.7 % — HIGH (ref 10.3–14.5)
SODIUM SERPL-SCNC: 145 MMOL/L — SIGNIFICANT CHANGE UP (ref 135–145)
SPECIMEN SOURCE: SIGNIFICANT CHANGE UP
WBC # BLD: 5.88 K/UL — SIGNIFICANT CHANGE UP (ref 3.8–10.5)
WBC # FLD AUTO: 5.88 K/UL — SIGNIFICANT CHANGE UP (ref 3.8–10.5)

## 2018-03-08 RX ORDER — SENNA PLUS 8.6 MG/1
2 TABLET ORAL
Qty: 0 | Refills: 0 | COMMUNITY
Start: 2018-03-08

## 2018-03-08 RX ORDER — ERYTHROPOIETIN 10000 [IU]/ML
8000 INJECTION, SOLUTION INTRAVENOUS; SUBCUTANEOUS
Qty: 0 | Refills: 0 | Status: DISCONTINUED | OUTPATIENT
Start: 2018-03-08 | End: 2018-03-08

## 2018-03-08 RX ORDER — LACTULOSE 10 G/15ML
20 SOLUTION ORAL ONCE
Qty: 0 | Refills: 0 | Status: COMPLETED | OUTPATIENT
Start: 2018-03-08 | End: 2018-03-08

## 2018-03-08 RX ORDER — DOCUSATE SODIUM 100 MG
1 CAPSULE ORAL
Qty: 0 | Refills: 0 | COMMUNITY
Start: 2018-03-08

## 2018-03-08 RX ORDER — ERYTHROPOIETIN 10000 [IU]/ML
8000 INJECTION, SOLUTION INTRAVENOUS; SUBCUTANEOUS
Qty: 0 | Refills: 0 | COMMUNITY
Start: 2018-03-08

## 2018-03-08 RX ADMIN — Medication 650 MILLIGRAM(S): at 14:48

## 2018-03-08 RX ADMIN — Medication 50 MILLIGRAM(S): at 05:26

## 2018-03-08 RX ADMIN — FINASTERIDE 5 MILLIGRAM(S): 5 TABLET, FILM COATED ORAL at 11:40

## 2018-03-08 RX ADMIN — Medication 0.5 MILLIGRAM(S): at 08:16

## 2018-03-08 RX ADMIN — Medication 25 MILLIGRAM(S): at 05:26

## 2018-03-08 RX ADMIN — GABAPENTIN 200 MILLIGRAM(S): 400 CAPSULE ORAL at 17:21

## 2018-03-08 RX ADMIN — GABAPENTIN 200 MILLIGRAM(S): 400 CAPSULE ORAL at 05:26

## 2018-03-08 RX ADMIN — HEPARIN SODIUM 5000 UNIT(S): 5000 INJECTION INTRAVENOUS; SUBCUTANEOUS at 05:28

## 2018-03-08 RX ADMIN — Medication 100 MILLIGRAM(S): at 11:39

## 2018-03-08 RX ADMIN — ERYTHROPOIETIN 8000 UNIT(S): 10000 INJECTION, SOLUTION INTRAVENOUS; SUBCUTANEOUS at 14:47

## 2018-03-08 RX ADMIN — PANTOPRAZOLE SODIUM 40 MILLIGRAM(S): 20 TABLET, DELAYED RELEASE ORAL at 05:26

## 2018-03-08 RX ADMIN — Medication 650 MILLIGRAM(S): at 05:25

## 2018-03-08 RX ADMIN — LACTULOSE 20 GRAM(S): 10 SOLUTION ORAL at 10:37

## 2018-03-08 RX ADMIN — ISOSORBIDE MONONITRATE 120 MILLIGRAM(S): 60 TABLET, EXTENDED RELEASE ORAL at 11:39

## 2018-03-08 RX ADMIN — Medication 100 MILLIGRAM(S): at 17:21

## 2018-03-08 RX ADMIN — Medication 650 MILLIGRAM(S): at 14:47

## 2018-03-08 RX ADMIN — Medication 10 MILLIGRAM(S): at 10:40

## 2018-03-08 RX ADMIN — Medication 1000 UNIT(S): at 11:39

## 2018-03-08 RX ADMIN — Medication 325 MILLIGRAM(S): at 11:39

## 2018-03-08 RX ADMIN — HEPARIN SODIUM 5000 UNIT(S): 5000 INJECTION INTRAVENOUS; SUBCUTANEOUS at 14:48

## 2018-03-08 RX ADMIN — Medication 25 MILLIGRAM(S): at 17:21

## 2018-03-08 RX ADMIN — Medication 100 MILLIGRAM(S): at 05:26

## 2018-03-08 RX ADMIN — Medication 81 MILLIGRAM(S): at 11:39

## 2018-03-08 RX ADMIN — AMLODIPINE BESYLATE 5 MILLIGRAM(S): 2.5 TABLET ORAL at 05:26

## 2018-03-08 NOTE — PROGRESS NOTE ADULT - ASSESSMENT
82 year old Man with pmhx as above presented to  ED, sent in from [facility] with c/o weakness in LUE and slumping over to his left side which started around 7:45 the morning of his arrival    LUE weakness / pain  - strength improving / dropped wrist is less  - CTH negative   - MRI/MRA head/neck - negative  - appreciate neurology input  - ongoing PT / L hand splint  - stop HS tramdol as he is mostly drowsy during the day / con't PRN tramdol / neurontin bid     RUL PNA- on cxr  - hes afebrile  /  WBC wnl  - dc'd ceftriaxone and azithromycin    Chronic HFpEF  - appears compensated   - con't hydralazine / Imdur / asa   - lasix held for worsening renal function  - TTE reviewed  - cardio consult input noted    CAD / PAD / DVT  - hx PCI  - chest pain free  -  con't asa / BB / statin   - had IVC filter placed last  encounter    Constipation  marked stool retention on CT scan  suspect contributing factor to abd pain and urinary retention was superimposed by constipation  large BM with lactulose/enema/ dulcolax supp    Anemia chronic disease  repeat H/H relatively stable holding at 7.7 / 25.0  seen by Dr Sanabria: epogen ordered  LLE with chronic stasis   - LLE dsg with ace wrap in place  - f/b vascular  - dsg change per vascular    pAFib  - rate controlled  - con't metoprolol 25 mg bid  - not on anticoag d/t recurrent GIB    HTN  - con't above meds  - monitor per protocol    ARYA on CKD stage 3 w/urinary retention  - creatinine up trending  - has also been following closely with renal Dr. Tomas for ARYA on CKD and for hyperkalemia  -  crt last encounter was 1.92 at discharge on 9/14/17 was 1.48; chart review notable for crt 1.92 in 12/2017  - lasix on hold  - bladder scan with >999ml s/p Feldman  - urology consult: appreciate Dr Gardiner --> will need out patient follow up  - s/p Feldman 3/7/18 o/p follow up with Dr. Garcia d/w over the phone    Anemia  - hx GIB last encounter requiring PRBC's (~3 units)  - occult stool negative   - suspect he's at his baseline  -  H/H 8.3 on adm and he has been hanging around 7.7-> 7.8 (was 10.1 in Dec 2017)  - GI consult (Dr Hernández) if he continues to downtrend     BPH w/ urinary retention  - start flomax (3/6/18)  - continue doxazosin    Lethargy likely hypoactive delirium with underlying dementia r/o new acute infections  - CXR  - CT abd to r/o any acute pathology  - inpatient monitoring    Dispo  - full code  - discharge to rehab today  - Leo Willson at bedside aware of plan of care 82 year old Man with pmhx as above presented to  ED, sent in from [facility] with c/o weakness in LUE and slumping over to his left side which started around 7:45 the morning of his arrival    LUE weakness / pain  - strength improving / dropped wrist is less  - CTH negative   - MRI/MRA head/neck - negative  - appreciate neurology input  - ongoing PT / L hand splint  - stop HS tramdol as he is mostly drowsy during the day / con't PRN tramdol / neurontin bid     RUL PNA- on cxr  - hes afebrile  /  WBC wnl  - dc'd ceftriaxone and azithromycin    Chronic HFpEF  - appears compensated   - con't hydralazine / Imdur / asa   - lasix held for worsening renal function  - TTE reviewed  - cardio consult input noted    CAD / PAD / DVT  - hx PCI  - chest pain free  -  con't asa / BB / statin   - had IVC filter placed last  encounter    Constipation  marked stool retention on CT scan  suspect contributing factor to abd pain and urinary retention was superimposed by constipation  large BM with lactulose/enema/ dulcolax supp    Anemia chronic disease  repeat H/H relatively stable holding at 7.7 / 25.0  seen by Dr Sanabria: epogen ordered  LLE with chronic stasis   - LLE dsg with ace wrap in place  - f/b vascular  - dsg change per vascular    pAFib  - rate controlled  - con't metoprolol 25 mg bid  - not on anticoag d/t recurrent GIB    HTN  - con't above meds  - monitor per protocol    ARYA on CKD stage 3 w/urinary retention  - creatinine up trending  - has also been following closely with renal Dr. Tomas for ARYA on CKD and for hyperkalemia  -  crt last encounter was 1.92 at discharge on 9/14/17 was 1.48; chart review notable for crt 1.92 in 12/2017  - lasix on hold  - bladder scan with >999ml s/p Feldman  - urology consult: appreciate Dr Gardiner --> will need out patient follow up  - s/p Feldman 3/7/18 o/p follow up with Dr. Garcia d/w over the phone    Anemia  - hx GIB last encounter requiring PRBC's (~3 units)  - occult stool negative   - suspect he's at his baseline  -  H/H 8.3 on adm and he has been hanging around 7.7-> 7.8 (was 10.1 in Dec 2017)  - GI consult (Dr Hernández) if he continues to downtrend     BPH w/ urinary retention  - start flomax (3/6/18)  - continue doxazosin    Lethargy likely hypoactive delirium with underlying dementia r/o new acute infections  - CXR  - CT abd to r/o any acute pathology  - inpatient monitoring    Dispo  - full code  - discharge to rehab today, see updated note from 3/7/18  - Dtr Anamika at bedside aware of plan of care

## 2018-03-08 NOTE — PROGRESS NOTE ADULT - ATTENDING COMMENTS
Patient seen and examined. Left wrist drop persist. Discussed with patient and daughter at bed side regarding management and d/c plan.  Agree with TINY Garcia assessment and plan.   Waiting for left wrist splint.
Patient seen and examined with NP Enid Dawn . I performed a history and physical examination of the patient and discussed his management with the NP.   I reviewed the NP’s note and agree with the documented findings and plan of care.
Patient seen and examined with NP Enid Dawn . I performed a history and physical examination of the patient and discussed patient’s management with the NP.   I reviewed the NP’s note and agree with the documented findings and plan of care.
Patient seen and examined. Daughter at bed side. Discussed with daughter at bed side in length regarding management plan.  Waiting for MRI brain.   Agree with NP Nereyda assessment and plan.

## 2018-03-08 NOTE — CHART NOTE - NSCHARTNOTEFT_GEN_A_CORE
Assessment:   *pt continues to be managed for LUE weakness/pain with RUL PNA; chronic CHF.  Pt with ARYA on CKD, being followed by nephrology with potassium WNL.  *pt observed being fed breakfast with good appetite; consuming 100% of breakfast.  Pt with  prosource once daily to aid meeting increased protein needs.  As pt is with good appetite and with prosource, likely pt is meeting estimated lower level of protein needs for wound healing.  Continue to encourage PO intake with oral supplement.  *in v/o unstageable PU, rec'd add MVI daily, vitamin C 500mg BID, and zinc sulfate 220mg BID x 10 days to optimize wound healing  *per EMR, pt with 3.4Kg wt loss since admission (5%); likely fluid related as pt is being managed for CHF and has received lasix during admission.  Continue to monitor wt's with daily checks.    Diet Presciption: Diet, DASH/TLC:   Sodium & Cholesterol Restricted (03-01-18 @ 16:41)      Wt Hx:   3/8: 69.2Kg   3/1: 72.6Kg      Pertinent Medications: MEDICATIONS  (STANDING):  acetaminophen   Tablet. 650 milliGRAM(s) Oral every 8 hours  allopurinol 100 milliGRAM(s) Oral daily  amLODIPine   Tablet 5 milliGRAM(s) Oral daily  aspirin enteric coated 81 milliGRAM(s) Oral daily  buDESOnide   0.5 milliGRAM(s) Respule 0.5 milliGRAM(s) Inhalation two times a day  cholecalciferol 1000 Unit(s) Oral daily  docusate sodium 100 milliGRAM(s) Oral two times a day  doxazosin 8 milliGRAM(s) Oral at bedtime  ferrous    sulfate 325 milliGRAM(s) Oral daily  finasteride 5 milliGRAM(s) Oral daily  gabapentin 200 milliGRAM(s) Oral two times a day  heparin  Injectable 5000 Unit(s) SubCutaneous every 8 hours  hydrALAZINE 50 milliGRAM(s) Oral daily  isosorbide   mononitrate ER Tablet (IMDUR) 120 milliGRAM(s) Oral daily  metoprolol     tartrate 25 milliGRAM(s) Oral two times a day  pantoprazole    Tablet 40 milliGRAM(s) Oral before breakfast  senna 2 Tablet(s) Oral at bedtime  simvastatin 10 milliGRAM(s) Oral at bedtime  sodium chloride 0.45%. 1000 milliLiter(s) (50 mL/Hr) IV Continuous <Continuous>    MEDICATIONS  (PRN):  acetaminophen   Tablet 650 milliGRAM(s) Oral every 6 hours PRN For Temp greater than 38 C (100.4 F)  bisacodyl Suppository 10 milliGRAM(s) Rectal daily PRN Constipation  ondansetron Injectable 4 milliGRAM(s) IV Push every 6 hours PRN Nausea  traMADol 25 milliGRAM(s) Oral two times a day PRN Severe Pain (7 - 10)    Pertinent Labs: 03-08 Na145 mmol/L Glu 96 mg/dL K+ 4.0 mmol/L Cr  1.52 mg/dL<H> BUN 75 mg/dL<H> Phos n/a   Alb n/a   PAB n/a        CAPILLARY BLOOD GLUCOSE      POCT Blood Glucose.: 112 mg/dL (07 Mar 2018 16:51)      Skin: omar score = 15  unstageable Left heel  unstageable Left plantar surface of great toe    Estimated Needs:   Estimated Energy Needs (25-30 calories/kg):  · Weight  (lbs) 158.2 lb  · Weight (kg) 71.7 kg  · From 1792 To 2151 Kcal    Other Calculation:  · Other Calculation  Ht.    66  "        Wt.  158.2  #              BMI 25.5                 IBW   133 # (adjusted for AKA)                 Pt is at   118 %  IBW    Estimated Protein Needs (1.3-1.8 gm/kg):  From 93-129g protein    · Nutrient: Increased nutrient needs (specify)  · Etiology: Unhealed Pressure ulcers  · Signs/Symptoms: weight loss, unstageable malleoulus and heel  · Nutrition Intervention: Medical Food Supplements; Vitamin; Mineral  · Medical and Food Supplements: Modified beverage; Prosource  · Mineral: Zinc sulfate    Nutrition Diagnosis is [x] ongoing  [ ] resolved [ ] not applicable      Goal/Expected Outcome: PO intake >80% of meals/supplements, healing pressure ulcers, weight maintenance    Previous Goal is [x] met [ ] partially met [ ] not met [ ] no longer applicable [ ] improving    New Nutrition Diagnosis: [x] not applicable     Recommendations:  1) c/w DASH diet with prosource once daily  2) continue to provide maximum assistance/encouragement with all PO intake  3) add MVI daily, vitamin C 500mg BID, zinc sulfate 220mg BID x 10days  4) daily wt checks    Monitoring and Evaluation:   [x] PO intake/Nutr support infusion [ x ] Tolerance to Nutr [ x ] weights [ x ] labs[ x ] follow up per protocol  [ ] other:

## 2018-03-08 NOTE — PROGRESS NOTE ADULT - SUBJECTIVE AND OBJECTIVE BOX
vitals stable    hoffman placed for retention    other problems being treated    continue local care and conservative treatment for now    MEDICATIONS  (STANDING):  acetaminophen   Tablet. 650 milliGRAM(s) Oral every 8 hours  allopurinol 100 milliGRAM(s) Oral daily  amLODIPine   Tablet 5 milliGRAM(s) Oral daily  aspirin enteric coated 81 milliGRAM(s) Oral daily  buDESOnide   0.5 milliGRAM(s) Respule 0.5 milliGRAM(s) Inhalation two times a day  cholecalciferol 1000 Unit(s) Oral daily  docusate sodium 100 milliGRAM(s) Oral two times a day  doxazosin 8 milliGRAM(s) Oral at bedtime  ferrous    sulfate 325 milliGRAM(s) Oral daily  finasteride 5 milliGRAM(s) Oral daily  gabapentin 200 milliGRAM(s) Oral two times a day  heparin  Injectable 5000 Unit(s) SubCutaneous every 8 hours  hydrALAZINE 50 milliGRAM(s) Oral daily  isosorbide   mononitrate ER Tablet (IMDUR) 120 milliGRAM(s) Oral daily  lactulose Syrup 20 Gram(s) Oral once  metoprolol     tartrate 25 milliGRAM(s) Oral two times a day  pantoprazole    Tablet 40 milliGRAM(s) Oral before breakfast  senna 2 Tablet(s) Oral at bedtime  simvastatin 10 milliGRAM(s) Oral at bedtime  sodium chloride 0.45%. 1000 milliLiter(s) (50 mL/Hr) IV Continuous <Continuous>    MEDICATIONS  (PRN):  acetaminophen   Tablet 650 milliGRAM(s) Oral every 6 hours PRN For Temp greater than 38 C (100.4 F)  bisacodyl Suppository 10 milliGRAM(s) Rectal daily PRN Constipation  ondansetron Injectable 4 milliGRAM(s) IV Push every 6 hours PRN Nausea  traMADol 25 milliGRAM(s) Oral two times a day PRN Severe Pain (7 - 10)      Allergies    Zosyn (Rash)    Intolerances        Flatus: [ ] YES [ ] NO             Bowel Movement: [ ] YES [ ] NO  Pain (0-10):            Pain Control Adequate: [ ] YES [ ] NO  Nausea: [ ] YES [ ] NO            Vomiting: [ ] YES [ ] NO  Diarrhea: [ ] YES [ ] NO         Constipation: [ ] YES [ ] NO     Chest Pain: [ ] YES [ ] NO    SOB:  [ ] YES [ ] NO    Vital Signs Last 24 Hrs  T(C): 36.6 (08 Mar 2018 05:23), Max: 36.9 (07 Mar 2018 16:58)  T(F): 97.8 (08 Mar 2018 05:23), Max: 98.4 (07 Mar 2018 16:58)  HR: 93 (08 Mar 2018 05:23) (88 - 93)  BP: 152/47 (08 Mar 2018 05:23) (130/50 - 152/47)  BP(mean): --  RR: 18 (08 Mar 2018 05:23) (17 - 18)  SpO2: 98% (08 Mar 2018 05:23) (95% - 100%)    I&O's Summary    07 Mar 2018 07:01  -  08 Mar 2018 07:00  --------------------------------------------------------  IN: 160 mL / OUT: 1500 mL / NET: -1340 mL        Physical Exam:  General: NAD, resting comfortably  Pulmonary: normal resp effort, CTA-B  Cardiovascular: NSR  Abdominal: soft, NT/ND  Extremities: WWP, normal strength  Neuro: A/O x 3, CNs II-XII grossly intact, normal motor/sensation, no focal deficits  Pulses:   Right:                                                                          Left:  FEM [ ]2+ [ ]1+ [ ]doppler                                             FEM [ ]2+ [ ]1+ [ ]doppler    POP [ ]2+ [ ]1+ [ ]doppler                                             POP [ ]2+ [ ]1+ [ ]doppler    DP [ ]2+ [ ]1+ [ ]doppler                                                DP [ ]2+ [ ]1+ [ ]doppler  PT[ ]2+ [ ]1+ [ ]doppler                                                  PT [ ]2+ [ ]1+ [ ]doppler    LABS:                        7.7    5.88  )-----------( 237      ( 08 Mar 2018 06:49 )             25.2     03-08    145  |  115<H>  |  75<H>  ----------------------------<  96  4.0   |  22  |  1.52<H>    Ca    8.4<L>      08 Mar 2018 06:49  Mg     2.2     03-08        Urinalysis Basic - ( 07 Mar 2018 12:21 )    Color: Yellow / Appearance: Clear / S.010 / pH: x  Gluc: x / Ketone: Negative  / Bili: Negative / Urobili: Negative mg/dL   Blood: x / Protein: 15 mg/dL / Nitrite: Negative   Leuk Esterase: Trace / RBC: Negative /HPF / WBC 3-5   Sq Epi: x / Non Sq Epi: Negative / Bacteria: Few        CAPILLARY BLOOD GLUCOSE      POCT Blood Glucose.: 112 mg/dL (07 Mar 2018 16:51)      RADIOLOGY & ADDITIONAL TESTS:
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
## 2018-03-08 NOTE — CONSULT NOTE ADULT - SUBJECTIVE AND OBJECTIVE BOX
Pvr >1000 hoffman placed clear   rec d/c home with hoffman will need out pt w/u for bph and poss neurogenic bladder

## 2018-03-08 NOTE — PROGRESS NOTE ADULT - PROVIDER SPECIALTY LIST ADULT
Cardiology
Hospitalist
Nephrology
Neurology
Plastic Surgery
Vascular Surgery
Hospitalist
Hospitalist
Nephrology
Nephrology

## 2018-03-08 NOTE — PROGRESS NOTE ADULT - ASSESSMENT
84 y/o wm with hx of ckd stage 3 ( 1.5-1.7) presents with focal weakness ( LUE) currently being evaluated for acute CVA noted with ARYA/ckd with RUL PNA.  AOCD with hx of GI bleed and with LLE venous stasis r/o OM.    PLAN  - PO fluids  - continue with lasix po  - fu h/h trend. since anemia related to gi blood loss in past, slowly, will monitor off LETICIA agents  - Neuro work-up on going, await MRI  - tachycardia, fu EKG  - FU MRI of LE    3/3 MK  - RAYA/CKD with variability:  will hold lasix and monitor until establish adequate po intake.  fu uop pattern  - metabolic acidosis: with lactate neg, will fu trend  - PNA; on abx  - LUE weakness: neuro input noted, PT eval  - anemia; fu h.h    3/4 MK  - ARYA/CKD increasign, check bladder scan, trial of ivf   off lasix  - metabolic acidosis: fu trend  - LUE weakness: pt emg as outpt  - anemia: fu trend of h/h  dw dr hernandez  - LE venous stasis: no OM on MRI    3/5 MK  - ARYA/CKD improving with ivf, will dec rate and continue. no evidence of retention  - metabolic acidosis: fu trend  - LUE weakness.  Left radial neuropathy PT and splint.  outpt emg  - Anemia: fu trend  - le venous stasis: no om on mri    3/6  CKD   Creat cont to improve  Abd exam show  bladder fullness, although PVR reported <50 ml yesterday  Bladder scan showed >999 ml UO  hoffman placed 900 ml UO, will follow labs    3/7 SY  --ARYA/CKD ; slowly stabilizing.   now with urinary retention.   Follow urine output.  --Somnolence :  Unclear if med related.   No new meds this admission.   BP stable.  Check ABG.  D/w Dr. Howe.    3/8  Ordered Erythropoietin  Will need outpt f/u anemia and EPO doses  creat improving  hoffman in place  stool in the rectum may be causing obstructive uropathy  enemas and oral laxatives given

## 2018-03-08 NOTE — PROGRESS NOTE ADULT - SUBJECTIVE AND OBJECTIVE BOX
NEPHROLOGY INTERVAL HPI/OVERNIGHT EVENTS:    3/8  Discussed at bedside w pt daughter and medical staff  Ordered  Erythropoietin today  Hopefully will get dose prior to DC  getting enema  for stool impaction in rectum  creatinine improving    3/7 SY  Pt's daughter at bedside.  Pt has been somnolent all day.  Unable to eat lunch.  Pt unarousable.  Opens eyes but no verbal response.    3/6  d/w daughter   pt ready for dc  creat stable  daughter stated baseline creat <2    HPI:  82 y/o wm with hx of ckd stage 3 ( 1.5-1.7) presents with left sided weakness that started at 7:45 yesterday morning.  In ER evaluated for acute CVA and not candidate for tpa due to time.  Recently with cr upto 2.7 and lasix was dc and with k of 5.9.  given kayexelate one day prior to admission.  Noted with PNA.  Today s/p echo and await MRI/MRA to further evaluate possible CVA.  CT head neg for acute changes.      Has hx of Dieulafoy clipped in past now with variable anemia.  OCB neg upon arrival  Followed by Dr Stephenson of GI.  Scheduled for MRI of LE that is followed by Dr Clement and seeing hyperbaric wound specialist     Appetite fair and tolerating po without any n/v/d.  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 650 milliGRAM(s) Oral every 8 hours  allopurinol 100 milliGRAM(s) Oral daily  amLODIPine   Tablet 5 milliGRAM(s) Oral daily  aspirin enteric coated 81 milliGRAM(s) Oral daily  buDESOnide   0.5 milliGRAM(s) Respule 0.5 milliGRAM(s) Inhalation two times a day  cholecalciferol 1000 Unit(s) Oral daily  docusate sodium 100 milliGRAM(s) Oral two times a day  doxazosin 8 milliGRAM(s) Oral at bedtime  epoetin nelly Injectable 8000 Unit(s) SubCutaneous <User Schedule>  ferrous    sulfate 325 milliGRAM(s) Oral daily  finasteride 5 milliGRAM(s) Oral daily  gabapentin 200 milliGRAM(s) Oral two times a day  heparin  Injectable 5000 Unit(s) SubCutaneous every 8 hours  hydrALAZINE 50 milliGRAM(s) Oral daily  isosorbide   mononitrate ER Tablet (IMDUR) 120 milliGRAM(s) Oral daily  metoprolol     tartrate 25 milliGRAM(s) Oral two times a day  pantoprazole    Tablet 40 milliGRAM(s) Oral before breakfast  senna 2 Tablet(s) Oral at bedtime  simvastatin 10 milliGRAM(s) Oral at bedtime  sodium chloride 0.45%. 1000 milliLiter(s) (50 mL/Hr) IV Continuous <Continuous>    MEDICATIONS  (PRN):  acetaminophen   Tablet 650 milliGRAM(s) Oral every 6 hours PRN For Temp greater than 38 C (100.4 F)  bisacodyl Suppository 10 milliGRAM(s) Rectal daily PRN Constipation  ondansetron Injectable 4 milliGRAM(s) IV Push every 6 hours PRN Nausea  traMADol 25 milliGRAM(s) Oral two times a day PRN Severe Pain (7 - 10)      Vital Signs Last 24 Hrs  T(C): 36.6 (08 Mar 2018 11:06), Max: 36.9 (07 Mar 2018 16:58)  T(F): 97.8 (08 Mar 2018 11:06), Max: 98.4 (07 Mar 2018 16:58)  HR: 87 (08 Mar 2018 11:06) (86 - 93)  BP: 152/47 (08 Mar 2018 05:23) (144/49 - 152/47)  BP(mean): --  RR: 18 (08 Mar 2018 05:23) (17 - 18)  SpO2: 98% (08 Mar 2018 05:23) (98% - 100%)      08 Mar 2018 07:01  -  08 Mar 2018 13:44  --------------------------------------------------------  IN:    Oral Fluid: 160 mL  Total IN: 160 mL    PHYSICAL EXAM:  awake alert  GENERAL: no acute distress  CHEST/LUNG: grossly clear  HEART: S1S2 RRR  ABDOMEN: soft   EXTREMITIES: no edema  SKIN:     LABS:                          7.6    x     )-----------( x        ( 08 Mar 2018 11:07 )             25.3       03-08    145  |  115<H>  |  75<H>  ----------------------------<  96  4.0   |  22  |  1.52<H>    Ca    8.4<L>      08 Mar 2018 06:49  Mg     2.2     03-08

## 2018-03-12 DIAGNOSIS — Z89.611 ACQUIRED ABSENCE OF RIGHT LEG ABOVE KNEE: ICD-10-CM

## 2018-03-12 DIAGNOSIS — N18.3 CHRONIC KIDNEY DISEASE, STAGE 3 (MODERATE): ICD-10-CM

## 2018-03-12 DIAGNOSIS — I50.32 CHRONIC DIASTOLIC (CONGESTIVE) HEART FAILURE: ICD-10-CM

## 2018-03-12 DIAGNOSIS — G47.33 OBSTRUCTIVE SLEEP APNEA (ADULT) (PEDIATRIC): ICD-10-CM

## 2018-03-12 DIAGNOSIS — L97.529 NON-PRESSURE CHRONIC ULCER OF OTHER PART OF LEFT FOOT WITH UNSPECIFIED SEVERITY: ICD-10-CM

## 2018-03-12 DIAGNOSIS — I13.0 HYPERTENSIVE HEART AND CHRONIC KIDNEY DISEASE WITH HEART FAILURE AND STAGE 1 THROUGH STAGE 4 CHRONIC KIDNEY DISEASE, OR UNSPECIFIED CHRONIC KIDNEY DISEASE: ICD-10-CM

## 2018-03-12 DIAGNOSIS — G56.92 UNSPECIFIED MONONEUROPATHY OF LEFT UPPER LIMB: ICD-10-CM

## 2018-03-12 DIAGNOSIS — J44.0 CHRONIC OBSTRUCTIVE PULMONARY DISEASE WITH (ACUTE) LOWER RESPIRATORY INFECTION: ICD-10-CM

## 2018-03-12 DIAGNOSIS — M48.00 SPINAL STENOSIS, SITE UNSPECIFIED: ICD-10-CM

## 2018-03-12 DIAGNOSIS — N40.1 BENIGN PROSTATIC HYPERPLASIA WITH LOWER URINARY TRACT SYMPTOMS: ICD-10-CM

## 2018-03-12 DIAGNOSIS — I25.10 ATHEROSCLEROTIC HEART DISEASE OF NATIVE CORONARY ARTERY WITHOUT ANGINA PECTORIS: ICD-10-CM

## 2018-03-12 DIAGNOSIS — I48.91 UNSPECIFIED ATRIAL FIBRILLATION: ICD-10-CM

## 2018-03-12 DIAGNOSIS — E87.2 ACIDOSIS: ICD-10-CM

## 2018-03-12 DIAGNOSIS — E83.42 HYPOMAGNESEMIA: ICD-10-CM

## 2018-03-12 DIAGNOSIS — R33.8 OTHER RETENTION OF URINE: ICD-10-CM

## 2018-03-12 DIAGNOSIS — I73.9 PERIPHERAL VASCULAR DISEASE, UNSPECIFIED: ICD-10-CM

## 2018-03-12 DIAGNOSIS — I48.0 PAROXYSMAL ATRIAL FIBRILLATION: ICD-10-CM

## 2018-03-12 DIAGNOSIS — K21.9 GASTRO-ESOPHAGEAL REFLUX DISEASE WITHOUT ESOPHAGITIS: ICD-10-CM

## 2018-03-12 DIAGNOSIS — D50.9 IRON DEFICIENCY ANEMIA, UNSPECIFIED: ICD-10-CM

## 2018-03-12 DIAGNOSIS — K59.00 CONSTIPATION, UNSPECIFIED: ICD-10-CM

## 2018-03-12 DIAGNOSIS — R53.1 WEAKNESS: ICD-10-CM

## 2018-03-12 DIAGNOSIS — J15.6 PNEUMONIA DUE TO OTHER GRAM-NEGATIVE BACTERIA: ICD-10-CM

## 2018-03-12 DIAGNOSIS — M54.12 RADICULOPATHY, CERVICAL REGION: ICD-10-CM

## 2018-03-12 DIAGNOSIS — I87.8 OTHER SPECIFIED DISORDERS OF VEINS: ICD-10-CM

## 2018-03-12 DIAGNOSIS — D63.1 ANEMIA IN CHRONIC KIDNEY DISEASE: ICD-10-CM

## 2018-03-12 DIAGNOSIS — E78.00 PURE HYPERCHOLESTEROLEMIA, UNSPECIFIED: ICD-10-CM

## 2018-03-12 DIAGNOSIS — F03.90 UNSPECIFIED DEMENTIA WITHOUT BEHAVIORAL DISTURBANCE: ICD-10-CM

## 2018-03-12 DIAGNOSIS — E78.5 HYPERLIPIDEMIA, UNSPECIFIED: ICD-10-CM

## 2018-04-04 ENCOUNTER — OUTPATIENT (OUTPATIENT)
Dept: OUTPATIENT SERVICES | Facility: HOSPITAL | Age: 83
LOS: 1 days | Discharge: HOME | End: 2018-04-04
Payer: MEDICARE

## 2018-04-04 DIAGNOSIS — Z95.9 PRESENCE OF CARDIAC AND VASCULAR IMPLANT AND GRAFT, UNSPECIFIED: Chronic | ICD-10-CM

## 2018-04-04 DIAGNOSIS — E11.621 TYPE 2 DIABETES MELLITUS WITH FOOT ULCER: ICD-10-CM

## 2018-04-04 PROCEDURE — 99214 OFFICE O/P EST MOD 30 MIN: CPT

## 2018-04-04 PROCEDURE — G0463: CPT

## 2018-04-09 DIAGNOSIS — M10.9 GOUT, UNSPECIFIED: ICD-10-CM

## 2018-04-09 DIAGNOSIS — E78.00 PURE HYPERCHOLESTEROLEMIA, UNSPECIFIED: ICD-10-CM

## 2018-04-09 DIAGNOSIS — L89.622 PRESSURE ULCER OF LEFT HEEL, STAGE 2: ICD-10-CM

## 2018-04-09 DIAGNOSIS — K21.9 GASTRO-ESOPHAGEAL REFLUX DISEASE WITHOUT ESOPHAGITIS: ICD-10-CM

## 2018-04-09 DIAGNOSIS — Z87.891 PERSONAL HISTORY OF NICOTINE DEPENDENCE: ICD-10-CM

## 2018-04-09 DIAGNOSIS — I25.10 ATHEROSCLEROTIC HEART DISEASE OF NATIVE CORONARY ARTERY WITHOUT ANGINA PECTORIS: ICD-10-CM

## 2018-04-09 DIAGNOSIS — Z88.0 ALLERGY STATUS TO PENICILLIN: ICD-10-CM

## 2018-04-09 DIAGNOSIS — L89.892 PRESSURE ULCER OF OTHER SITE, STAGE 2: ICD-10-CM

## 2018-04-09 DIAGNOSIS — D64.9 ANEMIA, UNSPECIFIED: ICD-10-CM

## 2018-04-09 DIAGNOSIS — I50.30 UNSPECIFIED DIASTOLIC (CONGESTIVE) HEART FAILURE: ICD-10-CM

## 2018-04-09 DIAGNOSIS — N40.0 BENIGN PROSTATIC HYPERPLASIA WITHOUT LOWER URINARY TRACT SYMPTOMS: ICD-10-CM

## 2018-04-09 DIAGNOSIS — Z79.899 OTHER LONG TERM (CURRENT) DRUG THERAPY: ICD-10-CM

## 2018-04-09 DIAGNOSIS — Z89.611 ACQUIRED ABSENCE OF RIGHT LEG ABOVE KNEE: ICD-10-CM

## 2018-04-09 DIAGNOSIS — N18.9 CHRONIC KIDNEY DISEASE, UNSPECIFIED: ICD-10-CM

## 2018-04-09 DIAGNOSIS — J44.9 CHRONIC OBSTRUCTIVE PULMONARY DISEASE, UNSPECIFIED: ICD-10-CM

## 2018-04-09 DIAGNOSIS — I83.892 VARICOSE VEINS OF LEFT LOWER EXTREMITY WITH OTHER COMPLICATIONS: ICD-10-CM

## 2018-04-09 DIAGNOSIS — M48.00 SPINAL STENOSIS, SITE UNSPECIFIED: ICD-10-CM

## 2018-04-09 DIAGNOSIS — I12.9 HYPERTENSIVE CHRONIC KIDNEY DISEASE WITH STAGE 1 THROUGH STAGE 4 CHRONIC KIDNEY DISEASE, OR UNSPECIFIED CHRONIC KIDNEY DISEASE: ICD-10-CM

## 2018-04-09 DIAGNOSIS — Z98.61 CORONARY ANGIOPLASTY STATUS: ICD-10-CM

## 2018-04-09 DIAGNOSIS — Z79.82 LONG TERM (CURRENT) USE OF ASPIRIN: ICD-10-CM

## 2018-04-09 DIAGNOSIS — Z83.3 FAMILY HISTORY OF DIABETES MELLITUS: ICD-10-CM

## 2018-04-19 ENCOUNTER — OUTPATIENT (OUTPATIENT)
Dept: OUTPATIENT SERVICES | Facility: HOSPITAL | Age: 83
LOS: 1 days | Discharge: HOME | End: 2018-04-19
Payer: MEDICARE

## 2018-04-19 ENCOUNTER — RESULT REVIEW (OUTPATIENT)
Age: 83
End: 2018-04-19

## 2018-04-19 DIAGNOSIS — L89.622 PRESSURE ULCER OF LEFT HEEL, STAGE 2: ICD-10-CM

## 2018-04-19 DIAGNOSIS — Z95.9 PRESENCE OF CARDIAC AND VASCULAR IMPLANT AND GRAFT, UNSPECIFIED: Chronic | ICD-10-CM

## 2018-04-19 PROCEDURE — 88304 TISSUE EXAM BY PATHOLOGIST: CPT

## 2018-04-19 PROCEDURE — 11045 DBRDMT SUBQ TISS EACH ADDL: CPT

## 2018-04-19 PROCEDURE — 88304 TISSUE EXAM BY PATHOLOGIST: CPT | Mod: 26

## 2018-04-19 PROCEDURE — 11042 DBRDMT SUBQ TIS 1ST 20SQCM/<: CPT

## 2018-04-21 DIAGNOSIS — L89.622 PRESSURE ULCER OF LEFT HEEL, STAGE 2: ICD-10-CM

## 2018-04-21 DIAGNOSIS — N40.0 BENIGN PROSTATIC HYPERPLASIA WITHOUT LOWER URINARY TRACT SYMPTOMS: ICD-10-CM

## 2018-04-21 DIAGNOSIS — Z88.0 ALLERGY STATUS TO PENICILLIN: ICD-10-CM

## 2018-04-21 DIAGNOSIS — L89.892 PRESSURE ULCER OF OTHER SITE, STAGE 2: ICD-10-CM

## 2018-04-21 DIAGNOSIS — I83.892 VARICOSE VEINS OF LEFT LOWER EXTREMITY WITH OTHER COMPLICATIONS: ICD-10-CM

## 2018-04-21 DIAGNOSIS — Z89.611 ACQUIRED ABSENCE OF RIGHT LEG ABOVE KNEE: ICD-10-CM

## 2018-04-21 DIAGNOSIS — J44.9 CHRONIC OBSTRUCTIVE PULMONARY DISEASE, UNSPECIFIED: ICD-10-CM

## 2018-04-21 DIAGNOSIS — Z87.891 PERSONAL HISTORY OF NICOTINE DEPENDENCE: ICD-10-CM

## 2018-04-21 DIAGNOSIS — Z79.899 OTHER LONG TERM (CURRENT) DRUG THERAPY: ICD-10-CM

## 2018-04-21 DIAGNOSIS — I12.9 HYPERTENSIVE CHRONIC KIDNEY DISEASE WITH STAGE 1 THROUGH STAGE 4 CHRONIC KIDNEY DISEASE, OR UNSPECIFIED CHRONIC KIDNEY DISEASE: ICD-10-CM

## 2018-04-21 DIAGNOSIS — E78.00 PURE HYPERCHOLESTEROLEMIA, UNSPECIFIED: ICD-10-CM

## 2018-04-21 DIAGNOSIS — I25.10 ATHEROSCLEROTIC HEART DISEASE OF NATIVE CORONARY ARTERY WITHOUT ANGINA PECTORIS: ICD-10-CM

## 2018-04-21 DIAGNOSIS — N18.9 CHRONIC KIDNEY DISEASE, UNSPECIFIED: ICD-10-CM

## 2018-04-21 DIAGNOSIS — M10.9 GOUT, UNSPECIFIED: ICD-10-CM

## 2018-04-21 DIAGNOSIS — Z98.61 CORONARY ANGIOPLASTY STATUS: ICD-10-CM

## 2018-04-21 DIAGNOSIS — Z83.3 FAMILY HISTORY OF DIABETES MELLITUS: ICD-10-CM

## 2018-04-21 DIAGNOSIS — K21.9 GASTRO-ESOPHAGEAL REFLUX DISEASE WITHOUT ESOPHAGITIS: ICD-10-CM

## 2018-04-21 DIAGNOSIS — D64.9 ANEMIA, UNSPECIFIED: ICD-10-CM

## 2018-04-21 DIAGNOSIS — M48.00 SPINAL STENOSIS, SITE UNSPECIFIED: ICD-10-CM

## 2018-04-21 DIAGNOSIS — Z79.82 LONG TERM (CURRENT) USE OF ASPIRIN: ICD-10-CM

## 2018-04-21 DIAGNOSIS — I50.30 UNSPECIFIED DIASTOLIC (CONGESTIVE) HEART FAILURE: ICD-10-CM

## 2018-04-23 LAB — SURGICAL PATHOLOGY FINAL REPORT - CH: SIGNIFICANT CHANGE UP

## 2018-05-03 ENCOUNTER — OUTPATIENT (OUTPATIENT)
Dept: OUTPATIENT SERVICES | Facility: HOSPITAL | Age: 83
LOS: 1 days | Discharge: HOME | End: 2018-05-03
Payer: MEDICARE

## 2018-05-03 DIAGNOSIS — Z95.9 PRESENCE OF CARDIAC AND VASCULAR IMPLANT AND GRAFT, UNSPECIFIED: Chronic | ICD-10-CM

## 2018-05-03 DIAGNOSIS — L89.622 PRESSURE ULCER OF LEFT HEEL, STAGE 2: ICD-10-CM

## 2018-05-03 PROCEDURE — 97602 WOUND(S) CARE NON-SELECTIVE: CPT

## 2018-05-05 DIAGNOSIS — L89.893 PRESSURE ULCER OF OTHER SITE, STAGE 3: ICD-10-CM

## 2018-05-05 DIAGNOSIS — I50.30 UNSPECIFIED DIASTOLIC (CONGESTIVE) HEART FAILURE: ICD-10-CM

## 2018-05-05 DIAGNOSIS — N40.0 BENIGN PROSTATIC HYPERPLASIA WITHOUT LOWER URINARY TRACT SYMPTOMS: ICD-10-CM

## 2018-05-05 DIAGNOSIS — N18.9 CHRONIC KIDNEY DISEASE, UNSPECIFIED: ICD-10-CM

## 2018-05-05 DIAGNOSIS — J44.9 CHRONIC OBSTRUCTIVE PULMONARY DISEASE, UNSPECIFIED: ICD-10-CM

## 2018-05-05 DIAGNOSIS — Z98.61 CORONARY ANGIOPLASTY STATUS: ICD-10-CM

## 2018-05-05 DIAGNOSIS — L89.892 PRESSURE ULCER OF OTHER SITE, STAGE 2: ICD-10-CM

## 2018-05-05 DIAGNOSIS — I12.9 HYPERTENSIVE CHRONIC KIDNEY DISEASE WITH STAGE 1 THROUGH STAGE 4 CHRONIC KIDNEY DISEASE, OR UNSPECIFIED CHRONIC KIDNEY DISEASE: ICD-10-CM

## 2018-05-05 DIAGNOSIS — Z88.0 ALLERGY STATUS TO PENICILLIN: ICD-10-CM

## 2018-05-05 DIAGNOSIS — L89.522 PRESSURE ULCER OF LEFT ANKLE, STAGE 2: ICD-10-CM

## 2018-05-05 DIAGNOSIS — M10.9 GOUT, UNSPECIFIED: ICD-10-CM

## 2018-05-05 DIAGNOSIS — K21.9 GASTRO-ESOPHAGEAL REFLUX DISEASE WITHOUT ESOPHAGITIS: ICD-10-CM

## 2018-05-05 DIAGNOSIS — Z87.891 PERSONAL HISTORY OF NICOTINE DEPENDENCE: ICD-10-CM

## 2018-05-05 DIAGNOSIS — Z79.82 LONG TERM (CURRENT) USE OF ASPIRIN: ICD-10-CM

## 2018-05-05 DIAGNOSIS — L89.622 PRESSURE ULCER OF LEFT HEEL, STAGE 2: ICD-10-CM

## 2018-05-05 DIAGNOSIS — Z79.899 OTHER LONG TERM (CURRENT) DRUG THERAPY: ICD-10-CM

## 2018-05-05 DIAGNOSIS — E78.00 PURE HYPERCHOLESTEROLEMIA, UNSPECIFIED: ICD-10-CM

## 2018-05-05 DIAGNOSIS — Z89.611 ACQUIRED ABSENCE OF RIGHT LEG ABOVE KNEE: ICD-10-CM

## 2018-05-05 DIAGNOSIS — Z83.3 FAMILY HISTORY OF DIABETES MELLITUS: ICD-10-CM

## 2018-05-05 DIAGNOSIS — I83.892 VARICOSE VEINS OF LEFT LOWER EXTREMITY WITH OTHER COMPLICATIONS: ICD-10-CM

## 2018-05-05 DIAGNOSIS — I25.10 ATHEROSCLEROTIC HEART DISEASE OF NATIVE CORONARY ARTERY WITHOUT ANGINA PECTORIS: ICD-10-CM

## 2018-05-05 DIAGNOSIS — M48.00 SPINAL STENOSIS, SITE UNSPECIFIED: ICD-10-CM

## 2018-05-05 DIAGNOSIS — D64.9 ANEMIA, UNSPECIFIED: ICD-10-CM

## 2018-05-17 ENCOUNTER — OUTPATIENT (OUTPATIENT)
Dept: OUTPATIENT SERVICES | Facility: HOSPITAL | Age: 83
LOS: 1 days | Discharge: HOME | End: 2018-05-17
Payer: MEDICARE

## 2018-05-17 ENCOUNTER — RESULT REVIEW (OUTPATIENT)
Age: 83
End: 2018-05-17

## 2018-05-17 DIAGNOSIS — L89.893 PRESSURE ULCER OF OTHER SITE, STAGE 3: ICD-10-CM

## 2018-05-17 DIAGNOSIS — Z95.9 PRESENCE OF CARDIAC AND VASCULAR IMPLANT AND GRAFT, UNSPECIFIED: Chronic | ICD-10-CM

## 2018-05-17 PROCEDURE — 88304 TISSUE EXAM BY PATHOLOGIST: CPT

## 2018-05-17 PROCEDURE — 11046 DBRDMT MUSC&/FSCA EA ADDL: CPT

## 2018-05-17 PROCEDURE — 11043 DBRDMT MUSC&/FSCA 1ST 20/<: CPT

## 2018-05-17 PROCEDURE — 88304 TISSUE EXAM BY PATHOLOGIST: CPT | Mod: 26

## 2018-05-18 DIAGNOSIS — Z83.3 FAMILY HISTORY OF DIABETES MELLITUS: ICD-10-CM

## 2018-05-18 DIAGNOSIS — Z88.0 ALLERGY STATUS TO PENICILLIN: ICD-10-CM

## 2018-05-18 DIAGNOSIS — M10.9 GOUT, UNSPECIFIED: ICD-10-CM

## 2018-05-18 DIAGNOSIS — I12.9 HYPERTENSIVE CHRONIC KIDNEY DISEASE WITH STAGE 1 THROUGH STAGE 4 CHRONIC KIDNEY DISEASE, OR UNSPECIFIED CHRONIC KIDNEY DISEASE: ICD-10-CM

## 2018-05-18 DIAGNOSIS — L89.622 PRESSURE ULCER OF LEFT HEEL, STAGE 2: ICD-10-CM

## 2018-05-18 DIAGNOSIS — I50.30 UNSPECIFIED DIASTOLIC (CONGESTIVE) HEART FAILURE: ICD-10-CM

## 2018-05-18 DIAGNOSIS — E78.00 PURE HYPERCHOLESTEROLEMIA, UNSPECIFIED: ICD-10-CM

## 2018-05-18 DIAGNOSIS — N18.9 CHRONIC KIDNEY DISEASE, UNSPECIFIED: ICD-10-CM

## 2018-05-18 DIAGNOSIS — Z79.82 LONG TERM (CURRENT) USE OF ASPIRIN: ICD-10-CM

## 2018-05-18 DIAGNOSIS — J44.9 CHRONIC OBSTRUCTIVE PULMONARY DISEASE, UNSPECIFIED: ICD-10-CM

## 2018-05-18 DIAGNOSIS — I83.892 VARICOSE VEINS OF LEFT LOWER EXTREMITY WITH OTHER COMPLICATIONS: ICD-10-CM

## 2018-05-18 DIAGNOSIS — I25.10 ATHEROSCLEROTIC HEART DISEASE OF NATIVE CORONARY ARTERY WITHOUT ANGINA PECTORIS: ICD-10-CM

## 2018-05-18 DIAGNOSIS — Z89.611 ACQUIRED ABSENCE OF RIGHT LEG ABOVE KNEE: ICD-10-CM

## 2018-05-18 DIAGNOSIS — Z87.891 PERSONAL HISTORY OF NICOTINE DEPENDENCE: ICD-10-CM

## 2018-05-18 DIAGNOSIS — Z98.61 CORONARY ANGIOPLASTY STATUS: ICD-10-CM

## 2018-05-18 DIAGNOSIS — M48.00 SPINAL STENOSIS, SITE UNSPECIFIED: ICD-10-CM

## 2018-05-18 DIAGNOSIS — Z79.899 OTHER LONG TERM (CURRENT) DRUG THERAPY: ICD-10-CM

## 2018-05-18 DIAGNOSIS — L89.892 PRESSURE ULCER OF OTHER SITE, STAGE 2: ICD-10-CM

## 2018-05-18 DIAGNOSIS — L89.893 PRESSURE ULCER OF OTHER SITE, STAGE 3: ICD-10-CM

## 2018-05-18 DIAGNOSIS — K21.9 GASTRO-ESOPHAGEAL REFLUX DISEASE WITHOUT ESOPHAGITIS: ICD-10-CM

## 2018-05-18 DIAGNOSIS — D64.9 ANEMIA, UNSPECIFIED: ICD-10-CM

## 2018-05-18 DIAGNOSIS — L89.522 PRESSURE ULCER OF LEFT ANKLE, STAGE 2: ICD-10-CM

## 2018-05-18 DIAGNOSIS — N40.0 BENIGN PROSTATIC HYPERPLASIA WITHOUT LOWER URINARY TRACT SYMPTOMS: ICD-10-CM

## 2018-05-21 LAB — SURGICAL PATHOLOGY FINAL REPORT - CH: SIGNIFICANT CHANGE UP

## 2018-05-31 ENCOUNTER — OUTPATIENT (OUTPATIENT)
Dept: OUTPATIENT SERVICES | Facility: HOSPITAL | Age: 83
LOS: 1 days | Discharge: HOME | End: 2018-05-31
Payer: MEDICARE

## 2018-05-31 DIAGNOSIS — L89.893 PRESSURE ULCER OF OTHER SITE, STAGE 3: ICD-10-CM

## 2018-05-31 DIAGNOSIS — Z95.9 PRESENCE OF CARDIAC AND VASCULAR IMPLANT AND GRAFT, UNSPECIFIED: Chronic | ICD-10-CM

## 2018-05-31 PROCEDURE — 17250 CHEM CAUT OF GRANLTJ TISSUE: CPT

## 2018-05-31 PROCEDURE — 99213 OFFICE O/P EST LOW 20 MIN: CPT

## 2018-06-02 DIAGNOSIS — I12.9 HYPERTENSIVE CHRONIC KIDNEY DISEASE WITH STAGE 1 THROUGH STAGE 4 CHRONIC KIDNEY DISEASE, OR UNSPECIFIED CHRONIC KIDNEY DISEASE: ICD-10-CM

## 2018-06-02 DIAGNOSIS — Z87.891 PERSONAL HISTORY OF NICOTINE DEPENDENCE: ICD-10-CM

## 2018-06-02 DIAGNOSIS — I83.892 VARICOSE VEINS OF LEFT LOWER EXTREMITY WITH OTHER COMPLICATIONS: ICD-10-CM

## 2018-06-02 DIAGNOSIS — L89.622 PRESSURE ULCER OF LEFT HEEL, STAGE 2: ICD-10-CM

## 2018-06-02 DIAGNOSIS — Z79.899 OTHER LONG TERM (CURRENT) DRUG THERAPY: ICD-10-CM

## 2018-06-02 DIAGNOSIS — L92.9 GRANULOMATOUS DISORDER OF THE SKIN AND SUBCUTANEOUS TISSUE, UNSPECIFIED: ICD-10-CM

## 2018-06-02 DIAGNOSIS — Z79.82 LONG TERM (CURRENT) USE OF ASPIRIN: ICD-10-CM

## 2018-06-02 DIAGNOSIS — K21.9 GASTRO-ESOPHAGEAL REFLUX DISEASE WITHOUT ESOPHAGITIS: ICD-10-CM

## 2018-06-02 DIAGNOSIS — N40.0 BENIGN PROSTATIC HYPERPLASIA WITHOUT LOWER URINARY TRACT SYMPTOMS: ICD-10-CM

## 2018-06-02 DIAGNOSIS — M48.00 SPINAL STENOSIS, SITE UNSPECIFIED: ICD-10-CM

## 2018-06-02 DIAGNOSIS — L89.892 PRESSURE ULCER OF OTHER SITE, STAGE 2: ICD-10-CM

## 2018-06-02 DIAGNOSIS — Z88.0 ALLERGY STATUS TO PENICILLIN: ICD-10-CM

## 2018-06-02 DIAGNOSIS — I50.30 UNSPECIFIED DIASTOLIC (CONGESTIVE) HEART FAILURE: ICD-10-CM

## 2018-06-02 DIAGNOSIS — L89.522 PRESSURE ULCER OF LEFT ANKLE, STAGE 2: ICD-10-CM

## 2018-06-02 DIAGNOSIS — E78.00 PURE HYPERCHOLESTEROLEMIA, UNSPECIFIED: ICD-10-CM

## 2018-06-02 DIAGNOSIS — M10.9 GOUT, UNSPECIFIED: ICD-10-CM

## 2018-06-02 DIAGNOSIS — J44.9 CHRONIC OBSTRUCTIVE PULMONARY DISEASE, UNSPECIFIED: ICD-10-CM

## 2018-06-02 DIAGNOSIS — Z89.611 ACQUIRED ABSENCE OF RIGHT LEG ABOVE KNEE: ICD-10-CM

## 2018-06-02 DIAGNOSIS — Z83.3 FAMILY HISTORY OF DIABETES MELLITUS: ICD-10-CM

## 2018-06-02 DIAGNOSIS — D64.9 ANEMIA, UNSPECIFIED: ICD-10-CM

## 2018-06-02 DIAGNOSIS — L89.893 PRESSURE ULCER OF OTHER SITE, STAGE 3: ICD-10-CM

## 2018-06-02 DIAGNOSIS — L84 CORNS AND CALLOSITIES: ICD-10-CM

## 2018-06-02 DIAGNOSIS — I25.10 ATHEROSCLEROTIC HEART DISEASE OF NATIVE CORONARY ARTERY WITHOUT ANGINA PECTORIS: ICD-10-CM

## 2018-06-02 DIAGNOSIS — N18.9 CHRONIC KIDNEY DISEASE, UNSPECIFIED: ICD-10-CM

## 2018-06-02 DIAGNOSIS — Z98.61 CORONARY ANGIOPLASTY STATUS: ICD-10-CM

## 2018-06-12 ENCOUNTER — OUTPATIENT (OUTPATIENT)
Dept: OUTPATIENT SERVICES | Facility: HOSPITAL | Age: 83
LOS: 1 days | Discharge: HOME | End: 2018-06-12
Payer: MEDICARE

## 2018-06-12 DIAGNOSIS — Z95.9 PRESENCE OF CARDIAC AND VASCULAR IMPLANT AND GRAFT, UNSPECIFIED: Chronic | ICD-10-CM

## 2018-06-12 DIAGNOSIS — L92.9 GRANULOMATOUS DISORDER OF THE SKIN AND SUBCUTANEOUS TISSUE, UNSPECIFIED: ICD-10-CM

## 2018-06-12 PROCEDURE — G0463: CPT

## 2018-06-13 DIAGNOSIS — E78.00 PURE HYPERCHOLESTEROLEMIA, UNSPECIFIED: ICD-10-CM

## 2018-06-13 DIAGNOSIS — D64.9 ANEMIA, UNSPECIFIED: ICD-10-CM

## 2018-06-13 DIAGNOSIS — L89.893 PRESSURE ULCER OF OTHER SITE, STAGE 3: ICD-10-CM

## 2018-06-13 DIAGNOSIS — Z89.611 ACQUIRED ABSENCE OF RIGHT LEG ABOVE KNEE: ICD-10-CM

## 2018-06-13 DIAGNOSIS — N18.9 CHRONIC KIDNEY DISEASE, UNSPECIFIED: ICD-10-CM

## 2018-06-13 DIAGNOSIS — K21.9 GASTRO-ESOPHAGEAL REFLUX DISEASE WITHOUT ESOPHAGITIS: ICD-10-CM

## 2018-06-13 DIAGNOSIS — L89.892 PRESSURE ULCER OF OTHER SITE, STAGE 2: ICD-10-CM

## 2018-06-13 DIAGNOSIS — L89.622 PRESSURE ULCER OF LEFT HEEL, STAGE 2: ICD-10-CM

## 2018-06-13 DIAGNOSIS — N40.0 BENIGN PROSTATIC HYPERPLASIA WITHOUT LOWER URINARY TRACT SYMPTOMS: ICD-10-CM

## 2018-06-13 DIAGNOSIS — M10.9 GOUT, UNSPECIFIED: ICD-10-CM

## 2018-06-13 DIAGNOSIS — I50.30 UNSPECIFIED DIASTOLIC (CONGESTIVE) HEART FAILURE: ICD-10-CM

## 2018-06-13 DIAGNOSIS — J44.9 CHRONIC OBSTRUCTIVE PULMONARY DISEASE, UNSPECIFIED: ICD-10-CM

## 2018-06-13 DIAGNOSIS — Z87.891 PERSONAL HISTORY OF NICOTINE DEPENDENCE: ICD-10-CM

## 2018-06-13 DIAGNOSIS — M48.00 SPINAL STENOSIS, SITE UNSPECIFIED: ICD-10-CM

## 2018-06-13 DIAGNOSIS — Z79.82 LONG TERM (CURRENT) USE OF ASPIRIN: ICD-10-CM

## 2018-06-13 DIAGNOSIS — Z79.899 OTHER LONG TERM (CURRENT) DRUG THERAPY: ICD-10-CM

## 2018-06-13 DIAGNOSIS — I25.10 ATHEROSCLEROTIC HEART DISEASE OF NATIVE CORONARY ARTERY WITHOUT ANGINA PECTORIS: ICD-10-CM

## 2018-06-13 DIAGNOSIS — I83.892 VARICOSE VEINS OF LEFT LOWER EXTREMITY WITH OTHER COMPLICATIONS: ICD-10-CM

## 2018-06-13 DIAGNOSIS — I12.9 HYPERTENSIVE CHRONIC KIDNEY DISEASE WITH STAGE 1 THROUGH STAGE 4 CHRONIC KIDNEY DISEASE, OR UNSPECIFIED CHRONIC KIDNEY DISEASE: ICD-10-CM

## 2018-06-13 DIAGNOSIS — Z98.61 CORONARY ANGIOPLASTY STATUS: ICD-10-CM

## 2018-06-13 DIAGNOSIS — Z83.3 FAMILY HISTORY OF DIABETES MELLITUS: ICD-10-CM

## 2018-06-13 DIAGNOSIS — Z88.0 ALLERGY STATUS TO PENICILLIN: ICD-10-CM

## 2018-06-28 ENCOUNTER — OUTPATIENT (OUTPATIENT)
Dept: OUTPATIENT SERVICES | Facility: HOSPITAL | Age: 83
LOS: 1 days | Discharge: HOME | End: 2018-06-28
Payer: MEDICARE

## 2018-06-28 DIAGNOSIS — L89.893 PRESSURE ULCER OF OTHER SITE, STAGE 3: ICD-10-CM

## 2018-06-28 DIAGNOSIS — Z95.9 PRESENCE OF CARDIAC AND VASCULAR IMPLANT AND GRAFT, UNSPECIFIED: Chronic | ICD-10-CM

## 2018-06-28 PROCEDURE — G0463: CPT

## 2018-06-28 PROCEDURE — 99213 OFFICE O/P EST LOW 20 MIN: CPT

## 2018-06-30 DIAGNOSIS — Z83.3 FAMILY HISTORY OF DIABETES MELLITUS: ICD-10-CM

## 2018-06-30 DIAGNOSIS — L89.892 PRESSURE ULCER OF OTHER SITE, STAGE 2: ICD-10-CM

## 2018-06-30 DIAGNOSIS — N40.0 BENIGN PROSTATIC HYPERPLASIA WITHOUT LOWER URINARY TRACT SYMPTOMS: ICD-10-CM

## 2018-06-30 DIAGNOSIS — M48.00 SPINAL STENOSIS, SITE UNSPECIFIED: ICD-10-CM

## 2018-06-30 DIAGNOSIS — K21.9 GASTRO-ESOPHAGEAL REFLUX DISEASE WITHOUT ESOPHAGITIS: ICD-10-CM

## 2018-06-30 DIAGNOSIS — E78.00 PURE HYPERCHOLESTEROLEMIA, UNSPECIFIED: ICD-10-CM

## 2018-06-30 DIAGNOSIS — Z87.891 PERSONAL HISTORY OF NICOTINE DEPENDENCE: ICD-10-CM

## 2018-06-30 DIAGNOSIS — Z79.899 OTHER LONG TERM (CURRENT) DRUG THERAPY: ICD-10-CM

## 2018-06-30 DIAGNOSIS — N18.9 CHRONIC KIDNEY DISEASE, UNSPECIFIED: ICD-10-CM

## 2018-06-30 DIAGNOSIS — L89.893 PRESSURE ULCER OF OTHER SITE, STAGE 3: ICD-10-CM

## 2018-06-30 DIAGNOSIS — I83.892 VARICOSE VEINS OF LEFT LOWER EXTREMITY WITH OTHER COMPLICATIONS: ICD-10-CM

## 2018-06-30 DIAGNOSIS — Z98.61 CORONARY ANGIOPLASTY STATUS: ICD-10-CM

## 2018-06-30 DIAGNOSIS — I25.10 ATHEROSCLEROTIC HEART DISEASE OF NATIVE CORONARY ARTERY WITHOUT ANGINA PECTORIS: ICD-10-CM

## 2018-06-30 DIAGNOSIS — D64.9 ANEMIA, UNSPECIFIED: ICD-10-CM

## 2018-06-30 DIAGNOSIS — I50.30 UNSPECIFIED DIASTOLIC (CONGESTIVE) HEART FAILURE: ICD-10-CM

## 2018-06-30 DIAGNOSIS — Z79.82 LONG TERM (CURRENT) USE OF ASPIRIN: ICD-10-CM

## 2018-06-30 DIAGNOSIS — Z88.0 ALLERGY STATUS TO PENICILLIN: ICD-10-CM

## 2018-06-30 DIAGNOSIS — Z89.611 ACQUIRED ABSENCE OF RIGHT LEG ABOVE KNEE: ICD-10-CM

## 2018-06-30 DIAGNOSIS — J44.9 CHRONIC OBSTRUCTIVE PULMONARY DISEASE, UNSPECIFIED: ICD-10-CM

## 2018-06-30 DIAGNOSIS — I12.9 HYPERTENSIVE CHRONIC KIDNEY DISEASE WITH STAGE 1 THROUGH STAGE 4 CHRONIC KIDNEY DISEASE, OR UNSPECIFIED CHRONIC KIDNEY DISEASE: ICD-10-CM

## 2018-06-30 DIAGNOSIS — M10.9 GOUT, UNSPECIFIED: ICD-10-CM

## 2018-07-06 ENCOUNTER — INPATIENT (INPATIENT)
Facility: HOSPITAL | Age: 83
LOS: 33 days | Discharge: NURSING, REHAB OR LONG-TERM CARE | End: 2018-08-09
Attending: FAMILY MEDICINE | Admitting: FAMILY MEDICINE
Payer: MEDICARE

## 2018-07-06 VITALS
TEMPERATURE: 101 F | HEART RATE: 130 BPM | WEIGHT: 175.05 LBS | SYSTOLIC BLOOD PRESSURE: 155 MMHG | RESPIRATION RATE: 18 BRPM | OXYGEN SATURATION: 94 % | DIASTOLIC BLOOD PRESSURE: 63 MMHG | HEIGHT: 67 IN

## 2018-07-06 DIAGNOSIS — Z95.9 PRESENCE OF CARDIAC AND VASCULAR IMPLANT AND GRAFT, UNSPECIFIED: Chronic | ICD-10-CM

## 2018-07-06 LAB
ADD ON TEST-SPECIMEN IN LAB: SIGNIFICANT CHANGE UP
ALBUMIN SERPL ELPH-MCNC: 1.9 G/DL — LOW (ref 3.3–5)
ALP SERPL-CCNC: 62 U/L — SIGNIFICANT CHANGE UP (ref 40–120)
ALT FLD-CCNC: 10 U/L — LOW (ref 12–78)
ANION GAP SERPL CALC-SCNC: 12 MMOL/L — SIGNIFICANT CHANGE UP (ref 5–17)
ANISOCYTOSIS BLD QL: SLIGHT — SIGNIFICANT CHANGE UP
APTT BLD: 32.5 SEC — SIGNIFICANT CHANGE UP (ref 27.5–37.4)
AST SERPL-CCNC: 10 U/L — LOW (ref 15–37)
BASOPHILS # BLD AUTO: 0 K/UL — SIGNIFICANT CHANGE UP (ref 0–0.2)
BASOPHILS NFR BLD AUTO: 0 % — SIGNIFICANT CHANGE UP (ref 0–2)
BILIRUB SERPL-MCNC: 0.4 MG/DL — SIGNIFICANT CHANGE UP (ref 0.2–1.2)
BUN SERPL-MCNC: 39 MG/DL — HIGH (ref 7–23)
CALCIUM SERPL-MCNC: 7.6 MG/DL — LOW (ref 8.5–10.1)
CHLORIDE SERPL-SCNC: 117 MMOL/L — HIGH (ref 96–108)
CO2 SERPL-SCNC: 19 MMOL/L — LOW (ref 22–31)
CREAT SERPL-MCNC: 1.75 MG/DL — HIGH (ref 0.5–1.3)
EOSINOPHIL # BLD AUTO: 0 K/UL — SIGNIFICANT CHANGE UP (ref 0–0.5)
EOSINOPHIL NFR BLD AUTO: 0 % — SIGNIFICANT CHANGE UP (ref 0–6)
GLUCOSE SERPL-MCNC: 105 MG/DL — HIGH (ref 70–99)
HCT VFR BLD CALC: 26.9 % — LOW (ref 39–50)
HGB BLD-MCNC: 8.5 G/DL — LOW (ref 13–17)
INR BLD: 1.24 RATIO — HIGH (ref 0.88–1.16)
LACTATE SERPL-SCNC: 1.5 MMOL/L — SIGNIFICANT CHANGE UP (ref 0.7–2)
LIDOCAIN IGE QN: 56 U/L — LOW (ref 73–393)
LYMPHOCYTES # BLD AUTO: 0.68 K/UL — LOW (ref 1–3.3)
LYMPHOCYTES # BLD AUTO: 5 % — LOW (ref 13–44)
MANUAL SMEAR VERIFICATION: SIGNIFICANT CHANGE UP
MCHC RBC-ENTMCNC: 29 PG — SIGNIFICANT CHANGE UP (ref 27–34)
MCHC RBC-ENTMCNC: 31.6 GM/DL — LOW (ref 32–36)
MCV RBC AUTO: 91.8 FL — SIGNIFICANT CHANGE UP (ref 80–100)
MONOCYTES # BLD AUTO: 0.82 K/UL — SIGNIFICANT CHANGE UP (ref 0–0.9)
MONOCYTES NFR BLD AUTO: 6 % — SIGNIFICANT CHANGE UP (ref 2–14)
NEUTROPHILS # BLD AUTO: 12.1 K/UL — HIGH (ref 1.8–7.4)
NEUTROPHILS NFR BLD AUTO: 87 % — HIGH (ref 43–77)
NEUTS BAND # BLD: 2 % — SIGNIFICANT CHANGE UP (ref 0–8)
NRBC # BLD: 0 /100 — SIGNIFICANT CHANGE UP (ref 0–0)
NRBC # BLD: SIGNIFICANT CHANGE UP /100 WBCS (ref 0–0)
OVALOCYTES BLD QL SMEAR: SLIGHT — SIGNIFICANT CHANGE UP
PLAT MORPH BLD: NORMAL — SIGNIFICANT CHANGE UP
PLATELET # BLD AUTO: 214 K/UL — SIGNIFICANT CHANGE UP (ref 150–400)
POIKILOCYTOSIS BLD QL AUTO: SLIGHT — SIGNIFICANT CHANGE UP
POTASSIUM SERPL-MCNC: 3.4 MMOL/L — LOW (ref 3.5–5.3)
POTASSIUM SERPL-SCNC: 3.4 MMOL/L — LOW (ref 3.5–5.3)
PROT SERPL-MCNC: 5.1 GM/DL — LOW (ref 6–8.3)
PROTHROM AB SERPL-ACNC: 13.4 SEC — HIGH (ref 9.8–12.7)
RBC # BLD: 2.93 M/UL — LOW (ref 4.2–5.8)
RBC # FLD: 19.2 % — HIGH (ref 10.3–14.5)
RBC BLD AUTO: ABNORMAL
SCHISTOCYTES BLD QL AUTO: SLIGHT — SIGNIFICANT CHANGE UP
SODIUM SERPL-SCNC: 148 MMOL/L — HIGH (ref 135–145)
WBC # BLD: 13.59 K/UL — HIGH (ref 3.8–10.5)
WBC # FLD AUTO: 13.59 K/UL — HIGH (ref 3.8–10.5)

## 2018-07-06 PROCEDURE — 99285 EMERGENCY DEPT VISIT HI MDM: CPT

## 2018-07-06 PROCEDURE — 93010 ELECTROCARDIOGRAM REPORT: CPT

## 2018-07-06 PROCEDURE — 71250 CT THORAX DX C-: CPT | Mod: 26

## 2018-07-06 PROCEDURE — 71045 X-RAY EXAM CHEST 1 VIEW: CPT | Mod: 26

## 2018-07-06 PROCEDURE — 74176 CT ABD & PELVIS W/O CONTRAST: CPT | Mod: 26

## 2018-07-06 RX ORDER — SIMVASTATIN 20 MG/1
10 TABLET, FILM COATED ORAL AT BEDTIME
Qty: 0 | Refills: 0 | Status: DISCONTINUED | OUTPATIENT
Start: 2018-07-06 | End: 2018-08-09

## 2018-07-06 RX ORDER — CIPROFLOXACIN LACTATE 400MG/40ML
200 VIAL (ML) INTRAVENOUS ONCE
Qty: 0 | Refills: 0 | Status: COMPLETED | OUTPATIENT
Start: 2018-07-06 | End: 2018-07-06

## 2018-07-06 RX ORDER — ACETAMINOPHEN 500 MG
650 TABLET ORAL ONCE
Qty: 0 | Refills: 0 | Status: COMPLETED | OUTPATIENT
Start: 2018-07-06 | End: 2018-07-06

## 2018-07-06 RX ORDER — ALLOPURINOL 300 MG
100 TABLET ORAL DAILY
Qty: 0 | Refills: 0 | Status: DISCONTINUED | OUTPATIENT
Start: 2018-07-06 | End: 2018-08-09

## 2018-07-06 RX ORDER — FERROUS SULFATE 325(65) MG
325 TABLET ORAL DAILY
Qty: 0 | Refills: 0 | Status: DISCONTINUED | OUTPATIENT
Start: 2018-07-07 | End: 2018-07-16

## 2018-07-06 RX ORDER — FINASTERIDE 5 MG/1
5 TABLET, FILM COATED ORAL DAILY
Qty: 0 | Refills: 0 | Status: DISCONTINUED | OUTPATIENT
Start: 2018-07-06 | End: 2018-08-09

## 2018-07-06 RX ORDER — ACETAMINOPHEN 500 MG
650 TABLET ORAL EVERY 8 HOURS
Qty: 0 | Refills: 0 | Status: DISCONTINUED | OUTPATIENT
Start: 2018-07-06 | End: 2018-08-09

## 2018-07-06 RX ORDER — ALBUTEROL 90 UG/1
2.5 AEROSOL, METERED ORAL EVERY 4 HOURS
Qty: 0 | Refills: 0 | Status: DISCONTINUED | OUTPATIENT
Start: 2018-07-06 | End: 2018-08-09

## 2018-07-06 RX ORDER — VANCOMYCIN HCL 1 G
1000 VIAL (EA) INTRAVENOUS ONCE
Qty: 0 | Refills: 0 | Status: COMPLETED | OUTPATIENT
Start: 2018-07-06 | End: 2018-07-06

## 2018-07-06 RX ORDER — SODIUM CHLORIDE 9 MG/ML
3 INJECTION INTRAMUSCULAR; INTRAVENOUS; SUBCUTANEOUS ONCE
Qty: 0 | Refills: 0 | Status: COMPLETED | OUTPATIENT
Start: 2018-07-06 | End: 2018-07-06

## 2018-07-06 RX ORDER — PANTOPRAZOLE SODIUM 20 MG/1
40 TABLET, DELAYED RELEASE ORAL
Qty: 0 | Refills: 0 | Status: DISCONTINUED | OUTPATIENT
Start: 2018-07-06 | End: 2018-07-08

## 2018-07-06 RX ORDER — SODIUM CHLORIDE 9 MG/ML
1000 INJECTION INTRAMUSCULAR; INTRAVENOUS; SUBCUTANEOUS
Qty: 0 | Refills: 0 | Status: DISCONTINUED | OUTPATIENT
Start: 2018-07-06 | End: 2018-07-08

## 2018-07-06 RX ORDER — BUDESONIDE, MICRONIZED 100 %
0.5 POWDER (GRAM) MISCELLANEOUS
Qty: 0 | Refills: 0 | Status: DISCONTINUED | OUTPATIENT
Start: 2018-07-06 | End: 2018-08-09

## 2018-07-06 RX ORDER — DILTIAZEM HCL 120 MG
5 CAPSULE, EXT RELEASE 24 HR ORAL
Qty: 125 | Refills: 0 | Status: DISCONTINUED | OUTPATIENT
Start: 2018-07-06 | End: 2018-07-07

## 2018-07-06 RX ORDER — VANCOMYCIN HCL 1 G
125 VIAL (EA) INTRAVENOUS EVERY 6 HOURS
Qty: 0 | Refills: 0 | Status: DISCONTINUED | OUTPATIENT
Start: 2018-07-06 | End: 2018-07-07

## 2018-07-06 RX ORDER — ISOSORBIDE MONONITRATE 60 MG/1
120 TABLET, EXTENDED RELEASE ORAL DAILY
Qty: 0 | Refills: 0 | Status: DISCONTINUED | OUTPATIENT
Start: 2018-07-06 | End: 2018-07-20

## 2018-07-06 RX ORDER — SODIUM CHLORIDE 9 MG/ML
500 INJECTION INTRAMUSCULAR; INTRAVENOUS; SUBCUTANEOUS
Qty: 0 | Refills: 0 | Status: COMPLETED | OUTPATIENT
Start: 2018-07-06 | End: 2018-07-06

## 2018-07-06 RX ORDER — DOXAZOSIN MESYLATE 4 MG
8 TABLET ORAL AT BEDTIME
Qty: 0 | Refills: 0 | Status: DISCONTINUED | OUTPATIENT
Start: 2018-07-06 | End: 2018-08-09

## 2018-07-06 RX ORDER — HYDRALAZINE HCL 50 MG
50 TABLET ORAL
Qty: 0 | Refills: 0 | Status: DISCONTINUED | OUTPATIENT
Start: 2018-07-06 | End: 2018-07-09

## 2018-07-06 RX ORDER — METRONIDAZOLE 500 MG
500 TABLET ORAL ONCE
Qty: 0 | Refills: 0 | Status: COMPLETED | OUTPATIENT
Start: 2018-07-06 | End: 2018-07-06

## 2018-07-06 RX ADMIN — Medication 100 MILLIGRAM(S): at 16:58

## 2018-07-06 RX ADMIN — SODIUM CHLORIDE 2000 MILLILITER(S): 9 INJECTION INTRAMUSCULAR; INTRAVENOUS; SUBCUTANEOUS at 13:30

## 2018-07-06 RX ADMIN — Medication 100 MILLIGRAM(S): at 18:07

## 2018-07-06 RX ADMIN — SODIUM CHLORIDE 2000 MILLILITER(S): 9 INJECTION INTRAMUSCULAR; INTRAVENOUS; SUBCUTANEOUS at 13:20

## 2018-07-06 RX ADMIN — FINASTERIDE 5 MILLIGRAM(S): 5 TABLET, FILM COATED ORAL at 21:55

## 2018-07-06 RX ADMIN — SODIUM CHLORIDE 2000 MILLILITER(S): 9 INJECTION INTRAMUSCULAR; INTRAVENOUS; SUBCUTANEOUS at 13:25

## 2018-07-06 RX ADMIN — SODIUM CHLORIDE 3 MILLILITER(S): 9 INJECTION INTRAMUSCULAR; INTRAVENOUS; SUBCUTANEOUS at 13:52

## 2018-07-06 RX ADMIN — Medication 650 MILLIGRAM(S): at 13:56

## 2018-07-06 RX ADMIN — Medication 125 MILLIGRAM(S): at 22:53

## 2018-07-06 RX ADMIN — Medication 250 MILLIGRAM(S): at 14:00

## 2018-07-06 RX ADMIN — Medication 8 MILLIGRAM(S): at 21:55

## 2018-07-06 RX ADMIN — SODIUM CHLORIDE 2000 MILLILITER(S): 9 INJECTION INTRAMUSCULAR; INTRAVENOUS; SUBCUTANEOUS at 13:29

## 2018-07-06 RX ADMIN — Medication 5 MG/HR: at 18:07

## 2018-07-06 RX ADMIN — Medication 650 MILLIGRAM(S): at 14:25

## 2018-07-06 RX ADMIN — Medication 100 MILLIGRAM(S): at 23:32

## 2018-07-06 RX ADMIN — Medication 50 MILLIGRAM(S): at 21:55

## 2018-07-06 RX ADMIN — SODIUM CHLORIDE 2000 MILLILITER(S): 9 INJECTION INTRAMUSCULAR; INTRAVENOUS; SUBCUTANEOUS at 13:26

## 2018-07-06 RX ADMIN — SODIUM CHLORIDE 75 MILLILITER(S): 9 INJECTION INTRAMUSCULAR; INTRAVENOUS; SUBCUTANEOUS at 21:56

## 2018-07-06 NOTE — ED PROVIDER NOTE - MEDICAL DECISION MAKING DETAILS
Elderly male with multiple medical hx s/p in patient care for PNA, subsequent OP c-diff tx with 10 days of PO vancomycin + abd pain and fever with TTP. Plan sepsi including fluid, labs, ekg, cxr, CT A/P, IV abx, stool studies for c-diff

## 2018-07-06 NOTE — ED ADULT NURSE REASSESSMENT NOTE - COMFORT CARE
repositioned/side rails up/assisted in cleaning and changing patient. patient is clean and dry at this time. hourly rounding completed

## 2018-07-06 NOTE — ED ADULT NURSE NOTE - NSSISCREENINGQ1_ED_A_ED
Initial visit by  to convey care and concern and encourage patient that  services are available if desired. No needs were voiced during the visit. Provided business card for future reference.      Hans GreeneHer[facility] Health Systemcsah  Board Certified  No

## 2018-07-06 NOTE — H&P ADULT - HISTORY OF PRESENT ILLNESS
Pt is a 82 y/o M w/pmhx of Afib, CHF, CAD s/p stents, COPD, PNA, upper GI bleed (duodenal)  BIB EMS from [facility] for fever, abd pain, and diarrhea that began 3 weeks ago. Was in the hospital for PNA tx and was later dx with C-diff and started on a course of vanco for 10 days for the C-diff. States that he has had diarrhea since before the course of abx. Pain has been increased for the past few weeks and is characterized as a cramping feeling. Daughters also note that there is increased distension. Also notes chest pain characterized as a cramping in the central chest. 83% O2 sat noted this morning at the assisted living facility. Takes O2 routinely at night but not during the day. Pt is a 84 y/o M w/pmhx of Afib, CHF, CAD s/p stents, COPD, PNA, upper GI bleed (duodenal)  BIB EMS from [facility] for fever, abd pain, and diarrhea that began 3 weeks ago. Was in the hospital for PNA tx and was later dx with C-diff and started on a course of PO vancomycin for 10 days for the C-diff. States that he has had diarrhea since before the course of abx. Pain has been increased for the past few weeks and is characterized as a cramping feeling. Daughters also note that there is increased distension. Also notes chest pain characterized as a cramping in the central chest. 83% O2 sat noted this morning at the assisted living facility. Takes O2 routinely at night but not during the day.

## 2018-07-06 NOTE — ED PROVIDER NOTE - DIAGNOSIS COUNSELING, MDM
conducted a detailed discussion... I had a detailed discussion with the patient and daughters regarding the historical points, exam findings, and any diagnostic results supporting the admit diagnosis.

## 2018-07-06 NOTE — ED ADULT NURSE REASSESSMENT NOTE - NS ED NURSE REASSESS COMMENT FT1
Care transferred from Pinky VUONG RN to myself. Pt resting in bed c/o and pain and soiled sheets. IV Cardizem infusing well. Pt updated on plan of care. Will continue to monitor

## 2018-07-06 NOTE — ED PROVIDER NOTE - GASTROINTESTINAL, MLM
Hypoactive bowel sounds with normal pitch. Diffuse TTP especially on the right side. + distension. No hyper tympany on percussion.

## 2018-07-06 NOTE — ED PROVIDER NOTE - OBJECTIVE STATEMENT
Pt is a 82 y/o M w/pmhx of Afib, CHF, CAD s/p stents, COPD, PNA, upper GI bleed (duodenal)  BIB EMS from [facility] assisted living for fever, abd pain, and diarrhea that began 3 weeks ago. Was in the hospital for PNA tx and was later dx with C-diff and started on a course of vanco for 10 days for the C-diff. States that he has had diarrhea since before the course of abx. Pain has been increased for the past few weeks and is characterized as a cramping feeling. Daughters also note that there is increased distension. Also notes chest pain characterized as a cramping in the central chest. 83% O2 sat noted this morning at the assisted living facility. Takes O2 routinely at night but not during the day. Dr. Hannah is the pts PCP, since being at [facility] Dr. Masters has been seeing him. Dr. Osullivan is nephrologist , Dr. Garcia is cardiologist.

## 2018-07-06 NOTE — ED ADULT NURSE NOTE - OBJECTIVE STATEMENT
Pt is a 83y male, A & O x 4, VSS, presents to ED w/ fever & elevated HR, pt has hx of afib PNA. CVA COPD, CKD, IVC filter, COPD  w/ residual left upper extremity weakness, denies chest pain or SOB,  bed rails up, will monitor.

## 2018-07-06 NOTE — ED ADULT NURSE REASSESSMENT NOTE - COMFORT CARE
repositioned/assisted to bathroom/side rails up/assisted in cleaning and changing patient. patient is clean and dry at this time. hourly rounding completed

## 2018-07-06 NOTE — ED PROVIDER NOTE - CONSTITUTIONAL, MLM
normal... Elderly white male, awake, alert, oriented to person, place, time/situation and in no apparent respiratory distress. + ill appearing

## 2018-07-06 NOTE — H&P ADULT - NSHPPHYSICALEXAM_GEN_ALL_CORE
Vital Signs Last 24 Hrs  T(C): 38.2 (06 Jul 2018 16:16), Max: 38.3 (06 Jul 2018 13:17)  T(F): 100.8 (06 Jul 2018 16:16), Max: 101 (06 Jul 2018 13:17)  HR: 106 (06 Jul 2018 19:21) (99 - 140)  BP: 154/47 (06 Jul 2018 19:21) (127/82 - 157/54)  RR: 18 (06 Jul 2018 19:21) (17 - 18)  SpO2: 97% (06 Jul 2018 19:21) (94% - 97%)

## 2018-07-06 NOTE — ED PROVIDER NOTE - PROGRESS NOTE DETAILS
Ronald FINE for ED attending, Doctor Contino. Rectal exam normal with normal tone [place] - Lot [id number] QC passed Dr. Shipley:  I have re-evaluated the patient's fluid status and reviewed vital signs. Clinical evaluation demonstrates an appropriate response to fluid resuscitation.  Pt received 1.5 - 2 liters IVF, sepsis fluids held due to CHF/CKD hx, lactate negative.

## 2018-07-06 NOTE — H&P ADULT - ASSESSMENT
84 yo male presented with diarrhea, fever and abdominal pain.    A/P:    1.  Pseudomembranous colitis  Sepsis  -keep NPO  -on IVF  -start PO Vancomycin   -continue Cipro IV  -follow stool report   -follow ID consult    2.  A fib with RVR  -on Cardizem drip  -will off the Cardizem drip as tolerated  -on PO Cardizem now   -due to very significant GI bleeding history in recent months  -he has multiple vessel clipping for GI bleed   -follow cardiology consult     3.  COPD  -stable  -Albuterol as needed    4.  Left LE wound  -chronic  -follow with wound care service     5.  SCD for DVT ppx

## 2018-07-07 LAB
ANION GAP SERPL CALC-SCNC: 14 MMOL/L — SIGNIFICANT CHANGE UP (ref 5–17)
ANISOCYTOSIS BLD QL: SLIGHT — SIGNIFICANT CHANGE UP
BASOPHILS # BLD AUTO: 0 K/UL — SIGNIFICANT CHANGE UP (ref 0–0.2)
BASOPHILS NFR BLD AUTO: 0 % — SIGNIFICANT CHANGE UP (ref 0–2)
BIZARRE PLATELETS BLD QL SMEAR: PRESENT — SIGNIFICANT CHANGE UP
BUN SERPL-MCNC: 44 MG/DL — HIGH (ref 7–23)
BURR CELLS BLD QL SMEAR: PRESENT — SIGNIFICANT CHANGE UP
C DIFF BY PCR RESULT: DETECTED
C DIFF TOX GENS STL QL NAA+PROBE: SIGNIFICANT CHANGE UP
CALCIUM SERPL-MCNC: 7.3 MG/DL — LOW (ref 8.5–10.1)
CHLORIDE SERPL-SCNC: 115 MMOL/L — HIGH (ref 96–108)
CO2 SERPL-SCNC: 17 MMOL/L — LOW (ref 22–31)
CREAT SERPL-MCNC: 2.26 MG/DL — HIGH (ref 0.5–1.3)
EOSINOPHIL # BLD AUTO: 0 K/UL — SIGNIFICANT CHANGE UP (ref 0–0.5)
EOSINOPHIL NFR BLD AUTO: 0 % — SIGNIFICANT CHANGE UP (ref 0–6)
GLUCOSE SERPL-MCNC: 102 MG/DL — HIGH (ref 70–99)
HCT VFR BLD CALC: 26.8 % — LOW (ref 39–50)
HGB BLD-MCNC: 8.3 G/DL — LOW (ref 13–17)
LYMPHOCYTES # BLD AUTO: 0.4 K/UL — LOW (ref 1–3.3)
LYMPHOCYTES # BLD AUTO: 3 % — LOW (ref 13–44)
MACROCYTES BLD QL: SLIGHT — SIGNIFICANT CHANGE UP
MANUAL SMEAR VERIFICATION: YES — SIGNIFICANT CHANGE UP
MCHC RBC-ENTMCNC: 28.4 PG — SIGNIFICANT CHANGE UP (ref 27–34)
MCHC RBC-ENTMCNC: 31 GM/DL — LOW (ref 32–36)
MCV RBC AUTO: 91.8 FL — SIGNIFICANT CHANGE UP (ref 80–100)
MONOCYTES # BLD AUTO: 0.8 K/UL — SIGNIFICANT CHANGE UP (ref 0–0.9)
MONOCYTES NFR BLD AUTO: 6 % — SIGNIFICANT CHANGE UP (ref 2–14)
NEUTROPHILS # BLD AUTO: 12.1 K/UL — HIGH (ref 1.8–7.4)
NEUTROPHILS NFR BLD AUTO: 84 % — HIGH (ref 43–77)
NEUTS BAND # BLD: 7 % — SIGNIFICANT CHANGE UP (ref 0–8)
NRBC # BLD: 0 /100 — SIGNIFICANT CHANGE UP (ref 0–0)
NRBC # BLD: SIGNIFICANT CHANGE UP /100 WBCS (ref 0–0)
OVALOCYTES BLD QL SMEAR: SLIGHT — SIGNIFICANT CHANGE UP
PLAT MORPH BLD: NORMAL — SIGNIFICANT CHANGE UP
PLATELET # BLD AUTO: 238 K/UL — SIGNIFICANT CHANGE UP (ref 150–400)
POIKILOCYTOSIS BLD QL AUTO: SLIGHT — SIGNIFICANT CHANGE UP
POTASSIUM SERPL-MCNC: 3.6 MMOL/L — SIGNIFICANT CHANGE UP (ref 3.5–5.3)
POTASSIUM SERPL-SCNC: 3.6 MMOL/L — SIGNIFICANT CHANGE UP (ref 3.5–5.3)
RBC # BLD: 2.92 M/UL — LOW (ref 4.2–5.8)
RBC # FLD: 19.5 % — HIGH (ref 10.3–14.5)
RBC BLD AUTO: ABNORMAL
SCHISTOCYTES BLD QL AUTO: SLIGHT — SIGNIFICANT CHANGE UP
SODIUM SERPL-SCNC: 146 MMOL/L — HIGH (ref 135–145)
TROPONIN I SERPL-MCNC: 0.02 NG/ML — SIGNIFICANT CHANGE UP (ref 0.01–0.04)
TROPONIN I SERPL-MCNC: 0.04 NG/ML — SIGNIFICANT CHANGE UP (ref 0.01–0.04)
TROPONIN I SERPL-MCNC: 0.04 NG/ML — SIGNIFICANT CHANGE UP (ref 0.01–0.04)
WBC # BLD: 13.3 K/UL — HIGH (ref 3.8–10.5)
WBC # FLD AUTO: 13.3 K/UL — HIGH (ref 3.8–10.5)

## 2018-07-07 PROCEDURE — 93010 ELECTROCARDIOGRAM REPORT: CPT

## 2018-07-07 RX ORDER — VANCOMYCIN HCL 1 G
500 VIAL (EA) INTRAVENOUS EVERY 6 HOURS
Qty: 0 | Refills: 0 | Status: DISCONTINUED | OUTPATIENT
Start: 2018-07-07 | End: 2018-07-25

## 2018-07-07 RX ORDER — MORPHINE SULFATE 50 MG/1
2 CAPSULE, EXTENDED RELEASE ORAL EVERY 6 HOURS
Qty: 0 | Refills: 0 | Status: DISCONTINUED | OUTPATIENT
Start: 2018-07-07 | End: 2018-07-09

## 2018-07-07 RX ORDER — CIPROFLOXACIN LACTATE 400MG/40ML
400 VIAL (ML) INTRAVENOUS EVERY 12 HOURS
Qty: 0 | Refills: 0 | Status: DISCONTINUED | OUTPATIENT
Start: 2018-07-07 | End: 2018-07-07

## 2018-07-07 RX ORDER — ONDANSETRON 8 MG/1
4 TABLET, FILM COATED ORAL EVERY 6 HOURS
Qty: 0 | Refills: 0 | Status: DISCONTINUED | OUTPATIENT
Start: 2018-07-07 | End: 2018-08-09

## 2018-07-07 RX ORDER — POTASSIUM CHLORIDE 20 MEQ
40 PACKET (EA) ORAL ONCE
Qty: 0 | Refills: 0 | Status: COMPLETED | OUTPATIENT
Start: 2018-07-07 | End: 2018-07-07

## 2018-07-07 RX ADMIN — FINASTERIDE 5 MILLIGRAM(S): 5 TABLET, FILM COATED ORAL at 12:26

## 2018-07-07 RX ADMIN — MORPHINE SULFATE 2 MILLIGRAM(S): 50 CAPSULE, EXTENDED RELEASE ORAL at 23:41

## 2018-07-07 RX ADMIN — Medication 125 MILLIGRAM(S): at 06:13

## 2018-07-07 RX ADMIN — Medication 8 MILLIGRAM(S): at 21:07

## 2018-07-07 RX ADMIN — Medication 200 MILLIGRAM(S): at 06:13

## 2018-07-07 RX ADMIN — ONDANSETRON 4 MILLIGRAM(S): 8 TABLET, FILM COATED ORAL at 13:48

## 2018-07-07 RX ADMIN — Medication 40 MILLIEQUIVALENT(S): at 06:12

## 2018-07-07 RX ADMIN — Medication 650 MILLIGRAM(S): at 22:05

## 2018-07-07 RX ADMIN — MORPHINE SULFATE 2 MILLIGRAM(S): 50 CAPSULE, EXTENDED RELEASE ORAL at 19:00

## 2018-07-07 RX ADMIN — SIMVASTATIN 10 MILLIGRAM(S): 20 TABLET, FILM COATED ORAL at 21:08

## 2018-07-07 RX ADMIN — Medication 50 MILLIGRAM(S): at 06:12

## 2018-07-07 RX ADMIN — MORPHINE SULFATE 2 MILLIGRAM(S): 50 CAPSULE, EXTENDED RELEASE ORAL at 23:09

## 2018-07-07 RX ADMIN — Medication 650 MILLIGRAM(S): at 21:08

## 2018-07-07 RX ADMIN — SODIUM CHLORIDE 75 MILLILITER(S): 9 INJECTION INTRAMUSCULAR; INTRAVENOUS; SUBCUTANEOUS at 06:13

## 2018-07-07 RX ADMIN — Medication 500 MILLIGRAM(S): at 23:09

## 2018-07-07 RX ADMIN — MORPHINE SULFATE 2 MILLIGRAM(S): 50 CAPSULE, EXTENDED RELEASE ORAL at 17:24

## 2018-07-07 RX ADMIN — Medication 50 MILLIGRAM(S): at 17:25

## 2018-07-07 RX ADMIN — ISOSORBIDE MONONITRATE 120 MILLIGRAM(S): 60 TABLET, EXTENDED RELEASE ORAL at 12:26

## 2018-07-07 RX ADMIN — Medication 500 MILLIGRAM(S): at 12:27

## 2018-07-07 RX ADMIN — Medication 500 MILLIGRAM(S): at 18:58

## 2018-07-07 RX ADMIN — SODIUM CHLORIDE 75 MILLILITER(S): 9 INJECTION INTRAMUSCULAR; INTRAVENOUS; SUBCUTANEOUS at 12:27

## 2018-07-07 RX ADMIN — Medication 100 MILLIGRAM(S): at 12:26

## 2018-07-07 RX ADMIN — Medication 0.5 MILLIGRAM(S): at 21:01

## 2018-07-07 RX ADMIN — PANTOPRAZOLE SODIUM 40 MILLIGRAM(S): 20 TABLET, DELAYED RELEASE ORAL at 06:12

## 2018-07-07 RX ADMIN — Medication 650 MILLIGRAM(S): at 04:00

## 2018-07-07 RX ADMIN — Medication 325 MILLIGRAM(S): at 12:26

## 2018-07-07 NOTE — PATIENT PROFILE ADULT. - ABILITY TO HEAR (WITH HEARING AID OR HEARING APPLIANCE IF NORMALLY USED):
wears hearing aides. not with patient/Mildly to Moderately Impaired: difficulty hearing in some environments or speaker may need to increase volume or speak distinctly

## 2018-07-07 NOTE — CONSULT NOTE ADULT - ASSESSMENT
Pt is a 84 y/o M w/pmhx of Afib, CHF, CAD s/p stents, COPD, PNA, upper GI bleed (duodenal), PVD, s/p right lower extremity amputation,  recent hospitalization for pneumonia and subsequent Cdiff colitis admitted on 7/6 from assisted living for evaluation of persistent abdominal pain, fever and diarrhea that has been ongoing despite the po vancomycin course that the patient had been on. The patient also notes mid epigastric/chest pain. History per daughter at bedside and medical record as patient is poor historian.  1. Patient admitted with severe Cdiff pan colitis, also noted with leukocytosis most likely reactive to Cdiff infection  - follow up cultures   - iv hydration and supportive care   - serial cbc and monitor temperature   - will continue po vancomycin but will increase dose to 500 mg po q 6 hours  - continue contact isolation  - limit antibiotics exposure  2. other issues: Afib, CHF, CAD s/p stents, COPD, PNA, upper GI bleed (duodenal), PVD, s/p right lower extremity amputation  - per medicine

## 2018-07-07 NOTE — PATIENT PROFILE ADULT. - VISION (WITH CORRECTIVE LENSES IF THE PATIENT USUALLY WEARS THEM):
Partially impaired: cannot see medication labels or newsprint, but can see obstacles in path, and the surrounding layout; can count fingers at arm's length/reading glasses not with patient

## 2018-07-07 NOTE — CONSULT NOTE ADULT - ASSESSMENT
Pt is a 84 y/o M w/pmhx of Afib,not on AC bc of recurrent GIB  CHF, CAD s/p stents, COPD, PNA, upper GI bleed (duodenal)  BIB EMS from [facility] for fever, abd pain, and diarrhea that began 3 weeks ago. Was in the hospital for PNA tx and was later dx with C-diff and started on a course of PO vancomycin for 10 days for the C-diff. States that he has had diarrhea since before the course of abx. Pain has been increased for the past few weeks and is characterized as a cramping feeling. Daughters also note that there is increased distension. Also notes chest pain characterized as a cramping in the central chest. 83% O2 sat noted this morning at the assisted living facility. Takes O2 routinely at night but not during the day. (06 Jul 2018 23:55) found to have pseudomembranous colitis   He denies CP or SOB , there was a mention of AFIB with RVR but I only see sinus tach on admission EKG   1) Cont Po cardizem for PAF but no AC due to signifcant GIB history . If pt has recurrent PAF would consider AMIO to maintain NSR   2) ABX for colitis.   3) cont hydralazine for HTN   4) ? mild pulm edema on CT but given diarrhea and elevated BUN/CR would hold off on IV lasix

## 2018-07-07 NOTE — PROGRESS NOTE ADULT - SUBJECTIVE AND OBJECTIVE BOX
Pt is a 82 y/o M w/pmhx of Afib, CHF, CAD s/p stents, COPD, PNA, upper GI bleed (duodenal)  BIB EMS from [facility] for fever, abd pain, and diarrhea that began 3 weeks ago. Was in the hospital for PNA tx and was later dx with C-diff and started on a course of PO vancomycin for 10 days for the C-diff. States that he has had diarrhea since before the course of abx. Pain has been increased for the past few weeks and is characterized as a cramping feeling. Daughters also note that there is increased distension. Also notes chest pain characterized as a cramping in the central chest. 83% O2 sat noted this morning at the assisted living facility. Takes O2 routinely at night but not during the day.      07/07/18: Patient seen and examined. Still with abd pain.       Vital Signs Last 24 Hrs  T(C): 37 (07 Jul 2018 11:23), Max: 38.2 (06 Jul 2018 16:16)  T(F): 98.6 (07 Jul 2018 11:23), Max: 100.8 (06 Jul 2018 16:16)  HR: 76 (07 Jul 2018 11:23) (76 - 140)  BP: 122/30 (07 Jul 2018 11:23) (122/30 - 157/54)  BP(mean): --  RR: 18 (07 Jul 2018 11:23) (17 - 18)  SpO2: 100% (07 Jul 2018 11:23) (93% - 100%)        PHYSICAL EXAM:   · CONSTITUTIONAL: Elderly white male, awake, alert, oriented to person, place, time/situation and in no apparent respiratory distress. + ill appearing	  · ENMT: Airway patent, Nasal mucosa clear. Mouth with mildly dry MM.	  · EYES: Clear bilaterally, pupils equal, round and reactive to light. EOMI	  · CARDIAC: Tachycardic, regular rhythm.	  · RESPIRATORY: Decreased breathe sounds at the right base, no crackles, wheezing or retractions.	  · GASTROINTESTINAL: Hypoactive bowel sounds with normal pitch. Diffuse TTP especially on the right side. + distension. No hyper tympany on percussion.	  · MUSCULOSKELETAL: right BKA. LLE SLR 10 degrees. MAEx4. No edema	  · SKIN: +tactile warmth, on the lateral aspect of the LLE has a sore with purulent discharge.	            Labs:                           8.3    13.30 )-----------( 238      ( 07 Jul 2018 06:55 )             26.8     07 Jul 2018 06:55    146    |  115    |  44     ----------------------------<  102    3.6     |  17     |  2.26     Ca    7.3        07 Jul 2018 06:55    TPro  5.1    /  Alb  1.9    /  TBili  0.4    /  DBili  x      /  AST  10     /  ALT  10     /  AlkPhos  62     06 Jul 2018 13:35    LIVER FUNCTIONS - ( 06 Jul 2018 13:35 )  Alb: 1.9 g/dL / Pro: 5.1 gm/dL / ALK PHOS: 62 U/L / ALT: 10 U/L / AST: 10 U/L / GGT: x           PT/INR - ( 06 Jul 2018 19:43 )   PT: 13.4 sec;   INR: 1.24 ratio         PTT - ( 06 Jul 2018 19:43 )  PTT:32.5 sec  CAPILLARY BLOOD GLUCOSE        CARDIAC MARKERS ( 07 Jul 2018 02:32 )  0.041 ng/mL / x     / x     / x     / x      CARDIAC MARKERS ( 06 Jul 2018 23:20 )  0.042 ng/mL / x     / x     / x     / x                MEDICATIONS:      allopurinol 100 milliGRAM(s) Oral daily  buDESOnide   0.5 milliGRAM(s) Respule 0.5 milliGRAM(s) Inhalation two times a day  diltiazem    Tablet 30 milliGRAM(s) Oral every 6 hours  diltiazem Infusion 5 mG/Hr (5 mL/Hr) IV Continuous <Continuous>  doxazosin 8 milliGRAM(s) Oral at bedtime  ferrous    sulfate 325 milliGRAM(s) Oral daily  finasteride 5 milliGRAM(s) Oral daily  hydrALAZINE 50 milliGRAM(s) Oral two times a day  isosorbide   mononitrate ER Tablet (IMDUR) 120 milliGRAM(s) Oral daily  pantoprazole    Tablet 40 milliGRAM(s) Oral before breakfast  simvastatin 10 milliGRAM(s) Oral at bedtime  sodium chloride 0.9%. 1000 milliLiter(s) (75 mL/Hr) IV Continuous <Continuous>  vancomycin    Solution 500 milliGRAM(s) Oral every 6 hours    MEDICATIONS  (PRN):  acetaminophen   Tablet 650 milliGRAM(s) Oral every 8 hours PRN For Temp greater than 38 C (100.4 F)  acetaminophen   Tablet. 650 milliGRAM(s) Oral every 8 hours PRN Mild Pain (1 - 3)  ALBUTerol   0.5% 2.5 milliGRAM(s) Nebulizer every 4 hours PRN Shortness of Breath and/or Wheezing  ondansetron Injectable 4 milliGRAM(s) IV Push every 6 hours PRN Nausea and/or Vomiting          Assessment and Plan:   Assessment:  · Assessment		  84 yo male presented with diarrhea, fever and abdominal pain.    A/P:    1.  Pseudomembranous colitis, H/O recent antibiotic use for pneumonia  No sepsis  -keep NPO except meds   -on IVF  -Increased PO Vancomycin   -D/C Cipro IV  -Dr Schultz consult appreciated.  -Consult GI: Dr jin, known to him.     2.  A fib with RVR  -S/P Cardizem drip  -on PO Cardizem now   -due to very significant GI bleeding history in recent months no AC  -he has multiple vessel clipping for GI bleed   -cardiology consult appreciated    3.  COPD  -stable  -Albuterol as needed    4.  Left LE wound  -chronic  -follow with wound care service as an outpatinet    5.  Chr diastolic CHF-  Hold diuretics due to diarrhea and C diff colitis.    6.   HTN-stable  Continue hydralazine.

## 2018-07-08 LAB
ANION GAP SERPL CALC-SCNC: 12 MMOL/L — SIGNIFICANT CHANGE UP (ref 5–17)
BASE EXCESS BLDA CALC-SCNC: -13.1 MMOL/L — LOW (ref -2–2)
BUN SERPL-MCNC: 58 MG/DL — HIGH (ref 7–23)
CALCIUM SERPL-MCNC: 7.3 MG/DL — LOW (ref 8.5–10.1)
CHLORIDE SERPL-SCNC: 117 MMOL/L — HIGH (ref 96–108)
CO2 SERPL-SCNC: 17 MMOL/L — LOW (ref 22–31)
CREAT SERPL-MCNC: 3.12 MG/DL — HIGH (ref 0.5–1.3)
GLUCOSE SERPL-MCNC: 113 MG/DL — HIGH (ref 70–99)
HCO3 BLDA-SCNC: 14 MMOL/L — LOW (ref 21–29)
HCT VFR BLD CALC: 27 % — LOW (ref 39–50)
HCT VFR BLD CALC: 27.7 % — LOW (ref 39–50)
HGB BLD-MCNC: 8.4 G/DL — LOW (ref 13–17)
HGB BLD-MCNC: 8.6 G/DL — LOW (ref 13–17)
LACTATE SERPL-SCNC: 0.9 MMOL/L — SIGNIFICANT CHANGE UP (ref 0.7–2)
LACTATE SERPL-SCNC: 0.9 MMOL/L — SIGNIFICANT CHANGE UP (ref 0.7–2)
MAGNESIUM SERPL-MCNC: 1.7 MG/DL — SIGNIFICANT CHANGE UP (ref 1.6–2.6)
MCHC RBC-ENTMCNC: 28.3 PG — SIGNIFICANT CHANGE UP (ref 27–34)
MCHC RBC-ENTMCNC: 28.9 PG — SIGNIFICANT CHANGE UP (ref 27–34)
MCHC RBC-ENTMCNC: 31 GM/DL — LOW (ref 32–36)
MCHC RBC-ENTMCNC: 31.1 GM/DL — LOW (ref 32–36)
MCV RBC AUTO: 91.1 FL — SIGNIFICANT CHANGE UP (ref 80–100)
MCV RBC AUTO: 92.8 FL — SIGNIFICANT CHANGE UP (ref 80–100)
NRBC # BLD: 0 /100 WBCS — SIGNIFICANT CHANGE UP (ref 0–0)
NRBC # BLD: 0 /100 WBCS — SIGNIFICANT CHANGE UP (ref 0–0)
PCO2 BLDA: 39 MMHG — SIGNIFICANT CHANGE UP (ref 32–46)
PH BLDA: 7.18 — CRITICAL LOW (ref 7.35–7.45)
PLATELET # BLD AUTO: 263 K/UL — SIGNIFICANT CHANGE UP (ref 150–400)
PLATELET # BLD AUTO: 263 K/UL — SIGNIFICANT CHANGE UP (ref 150–400)
PO2 BLDA: 68 MMHG — LOW (ref 74–108)
POTASSIUM SERPL-MCNC: 4.6 MMOL/L — SIGNIFICANT CHANGE UP (ref 3.5–5.3)
POTASSIUM SERPL-SCNC: 4.6 MMOL/L — SIGNIFICANT CHANGE UP (ref 3.5–5.3)
RBC # BLD: 2.91 M/UL — LOW (ref 4.2–5.8)
RBC # BLD: 3.04 M/UL — LOW (ref 4.2–5.8)
RBC # FLD: 19.9 % — HIGH (ref 10.3–14.5)
RBC # FLD: 20.1 % — HIGH (ref 10.3–14.5)
SAO2 % BLDA: 92 % — SIGNIFICANT CHANGE UP (ref 92–96)
SODIUM SERPL-SCNC: 146 MMOL/L — HIGH (ref 135–145)
WBC # BLD: 17.9 K/UL — HIGH (ref 3.8–10.5)
WBC # BLD: 18.69 K/UL — HIGH (ref 3.8–10.5)
WBC # FLD AUTO: 17.9 K/UL — HIGH (ref 3.8–10.5)
WBC # FLD AUTO: 18.69 K/UL — HIGH (ref 3.8–10.5)

## 2018-07-08 RX ORDER — MAGNESIUM SULFATE 500 MG/ML
1 VIAL (ML) INJECTION ONCE
Qty: 0 | Refills: 0 | Status: COMPLETED | OUTPATIENT
Start: 2018-07-08 | End: 2018-07-08

## 2018-07-08 RX ORDER — METOPROLOL TARTRATE 50 MG
25 TABLET ORAL
Qty: 0 | Refills: 0 | Status: DISCONTINUED | OUTPATIENT
Start: 2018-07-08 | End: 2018-07-20

## 2018-07-08 RX ORDER — METRONIDAZOLE 500 MG
TABLET ORAL
Qty: 0 | Refills: 0 | Status: DISCONTINUED | OUTPATIENT
Start: 2018-07-08 | End: 2018-07-17

## 2018-07-08 RX ORDER — METRONIDAZOLE 500 MG
500 TABLET ORAL ONCE
Qty: 0 | Refills: 0 | Status: COMPLETED | OUTPATIENT
Start: 2018-07-08 | End: 2018-07-08

## 2018-07-08 RX ORDER — METRONIDAZOLE 500 MG
500 TABLET ORAL EVERY 8 HOURS
Qty: 0 | Refills: 0 | Status: DISCONTINUED | OUTPATIENT
Start: 2018-07-08 | End: 2018-07-17

## 2018-07-08 RX ORDER — SODIUM CHLORIDE 9 MG/ML
1000 INJECTION, SOLUTION INTRAVENOUS
Qty: 0 | Refills: 0 | Status: DISCONTINUED | OUTPATIENT
Start: 2018-07-08 | End: 2018-07-10

## 2018-07-08 RX ADMIN — MORPHINE SULFATE 2 MILLIGRAM(S): 50 CAPSULE, EXTENDED RELEASE ORAL at 14:52

## 2018-07-08 RX ADMIN — Medication 100 MILLIGRAM(S): at 11:42

## 2018-07-08 RX ADMIN — Medication 25 MILLIGRAM(S): at 18:11

## 2018-07-08 RX ADMIN — Medication 100 GRAM(S): at 15:55

## 2018-07-08 RX ADMIN — Medication 500 MILLIGRAM(S): at 11:40

## 2018-07-08 RX ADMIN — MORPHINE SULFATE 2 MILLIGRAM(S): 50 CAPSULE, EXTENDED RELEASE ORAL at 20:30

## 2018-07-08 RX ADMIN — Medication 500 MILLIGRAM(S): at 23:02

## 2018-07-08 RX ADMIN — Medication 100 MILLIGRAM(S): at 21:20

## 2018-07-08 RX ADMIN — PANTOPRAZOLE SODIUM 40 MILLIGRAM(S): 20 TABLET, DELAYED RELEASE ORAL at 05:26

## 2018-07-08 RX ADMIN — Medication 325 MILLIGRAM(S): at 11:45

## 2018-07-08 RX ADMIN — Medication 8 MILLIGRAM(S): at 21:20

## 2018-07-08 RX ADMIN — Medication 500 MILLIGRAM(S): at 05:26

## 2018-07-08 RX ADMIN — ONDANSETRON 4 MILLIGRAM(S): 8 TABLET, FILM COATED ORAL at 16:43

## 2018-07-08 RX ADMIN — ISOSORBIDE MONONITRATE 120 MILLIGRAM(S): 60 TABLET, EXTENDED RELEASE ORAL at 11:42

## 2018-07-08 RX ADMIN — Medication 50 MILLIGRAM(S): at 05:26

## 2018-07-08 RX ADMIN — Medication 0.5 MILLIGRAM(S): at 09:16

## 2018-07-08 RX ADMIN — Medication 50 MILLIGRAM(S): at 18:11

## 2018-07-08 RX ADMIN — FINASTERIDE 5 MILLIGRAM(S): 5 TABLET, FILM COATED ORAL at 11:45

## 2018-07-08 RX ADMIN — Medication 500 MILLIGRAM(S): at 18:11

## 2018-07-08 RX ADMIN — MORPHINE SULFATE 2 MILLIGRAM(S): 50 CAPSULE, EXTENDED RELEASE ORAL at 21:20

## 2018-07-08 RX ADMIN — Medication 100 MILLIGRAM(S): at 13:15

## 2018-07-08 RX ADMIN — MORPHINE SULFATE 2 MILLIGRAM(S): 50 CAPSULE, EXTENDED RELEASE ORAL at 13:43

## 2018-07-08 RX ADMIN — SODIUM CHLORIDE 75 MILLILITER(S): 9 INJECTION, SOLUTION INTRAVENOUS at 14:49

## 2018-07-08 RX ADMIN — SIMVASTATIN 10 MILLIGRAM(S): 20 TABLET, FILM COATED ORAL at 21:20

## 2018-07-08 NOTE — CONSULT NOTE ADULT - ASSESSMENT
84 y/o wm with hx of ckd stage 3 ( scr 1.5-1.7) with ARYA due to volume depletion from CDiff associated colitis and diarrhea in presence of diuretic use PTA.  Now with non anion gap metabolic acidosis and worsening renal parameters.  CXR with mild CHF with hx of diastolic CHF.    PLAN  - obtain abd and lactate  - will change ivf and add bicarbonate and keep at current rate  - hold lasix done.   - fu uop and scr closely and will monitor respiratory status  - bp and hr stable now, cardizem adjusted and lopressor added

## 2018-07-08 NOTE — PROGRESS NOTE ADULT - SUBJECTIVE AND OBJECTIVE BOX
Patient is a 83y old  Male who presents with a chief complaint of complain of diarrhea, fever (06 Jul 2018 23:55)      Date of service: 07-08-18 @ 12:23  Patient notes less diarrhea, still with abdominal distention, afebrile  ROS: unable to obtain secondary to patient medical condition     MEDICATIONS  (STANDING):  allopurinol 100 milliGRAM(s) Oral daily  buDESOnide   0.5 milliGRAM(s) Respule 0.5 milliGRAM(s) Inhalation two times a day  diltiazem    Tablet 30 milliGRAM(s) Oral every 6 hours  doxazosin 8 milliGRAM(s) Oral at bedtime  ferrous    sulfate 325 milliGRAM(s) Oral daily  finasteride 5 milliGRAM(s) Oral daily  hydrALAZINE 50 milliGRAM(s) Oral two times a day  isosorbide   mononitrate ER Tablet (IMDUR) 120 milliGRAM(s) Oral daily  metoprolol tartrate 25 milliGRAM(s) Oral two times a day  metroNIDAZOLE  IVPB      simvastatin 10 milliGRAM(s) Oral at bedtime  sodium chloride 0.9%. 1000 milliLiter(s) (75 mL/Hr) IV Continuous <Continuous>  vancomycin    Solution 500 milliGRAM(s) Oral every 6 hours    MEDICATIONS  (PRN):  acetaminophen   Tablet 650 milliGRAM(s) Oral every 8 hours PRN For Temp greater than 38 C (100.4 F)  acetaminophen   Tablet. 650 milliGRAM(s) Oral every 8 hours PRN Mild Pain (1 - 3)  ALBUTerol   0.5% 2.5 milliGRAM(s) Nebulizer every 4 hours PRN Shortness of Breath and/or Wheezing  morphine  - Injectable 2 milliGRAM(s) IV Push every 6 hours PRN Severe Pain (7 - 10)  ondansetron Injectable 4 milliGRAM(s) IV Push every 6 hours PRN Nausea and/or Vomiting      Vital Signs Last 24 Hrs  T(C): 36.7 (08 Jul 2018 11:40), Max: 37.1 (07 Jul 2018 17:23)  T(F): 98.1 (08 Jul 2018 11:40), Max: 98.8 (07 Jul 2018 17:23)  HR: 74 (08 Jul 2018 11:40) (57 - 83)  BP: 137/33 (08 Jul 2018 11:40) (112/39 - 148/34)  BP(mean): --  RR: 16 (08 Jul 2018 09:22) (16 - 18)  SpO2: 92% (08 Jul 2018 09:22) (90% - 97%)    Physical Exam:        PE:    Constitutional: frail looking  HEENT: NC/AT, EOMI, PERRLA, conjunctivae clear; ears and nose atraumatic; pharynx clear  Neck: supple; thyroid not palpable  Respiratory: respiratory effort normal; clear to auscultation  Cardiovascular: S1S2 regular, no murmurs  Abdomen: soft, ,mildly tender, mildly distended, positive BS; no liver or spleen organomegaly  Genitourinary: no suprapubic tenderness  Musculoskeletal: s/p right lower extremity amputation; left lower extremity with dressing in place  Neurological/ Psychiatric: AxOx3, judgement and insight normal;  moving all extremities  Skin: no rashes; no palpable lesions    Labs: all available labs reviewed                        Labs:                        8.6    17.90 )-----------( 263      ( 08 Jul 2018 05:08 )             27.7     07-08    146<H>  |  117<H>  |  58<H>  ----------------------------<  113<H>  4.6   |  17<L>  |  3.12<H>    Ca    7.3<L>      08 Jul 2018 05:08    TPro  5.1<L>  /  Alb  1.9<L>  /  TBili  0.4  /  DBili  x   /  AST  10<L>  /  ALT  10<L>  /  AlkPhos  62  07-06           Cultures:       Culture - Blood (collected 07-06-18 @ 13:42)  Source: .Blood Blood-Peripheral  Preliminary Report (07-07-18 @ 18:03):    No growth to date.    Culture - Blood (collected 07-06-18 @ 13:35)  Source: .Blood Blood-Peripheral  Preliminary Report (07-07-18 @ 18:03):    No growth to date.               Clostridium difficile Toxin by PCR (07.06.18 @ 16:19)    C Diff by PCR Result: Detected    Clostridium difficile Toxin by PCR: The results of this test should be interpreted with consideration of all  clinical and laboratory findings. This test determines the presence of  the C. difficile tcdB gene at a given time and is not intended to  identify antibiotic associated disease or C. difficile infection without  clinical context. Successful treatment is based on the resolution of  clinical symptoms. This test should not be used as a "test of cure"  because C. difficile DNA will persist after successful treatment. Repeat  testing will not be permitted.    This test is performed on the BD MAX system using Real-Time PCR and  fluorogenic target-specific hybridization.        < from: CT Abdomen and Pelvis w/ Oral Cont (07.06.18 @ 18:03) >    IMPRESSION:     Severe pancolitis consistent with pseudomembranous colitis.    Mild pulmonary edema.    Small loculated right and trace layering left pleural effusions.      < end of copied text >          Radiology: all available radiological tests reviewed    Advanced directives addressed: full resuscitation

## 2018-07-08 NOTE — PROGRESS NOTE ADULT - SUBJECTIVE AND OBJECTIVE BOX
Patient is a 83y old  Male who presents with a chief complaint of complain of diarrhea, fever (06 Jul 2018 23:55)    7/8- " I feel much better today " less abd pain. No CP or SOB , short burst of AFIb on tele and a ventricular triplet    MEDICATIONS  (STANDING):  allopurinol 100 milliGRAM(s) Oral daily  buDESOnide   0.5 milliGRAM(s) Respule 0.5 milliGRAM(s) Inhalation two times a day  diltiazem    Tablet 30 milliGRAM(s) Oral every 6 hours  doxazosin 8 milliGRAM(s) Oral at bedtime  ferrous    sulfate 325 milliGRAM(s) Oral daily  finasteride 5 milliGRAM(s) Oral daily  hydrALAZINE 50 milliGRAM(s) Oral two times a day  isosorbide   mononitrate ER Tablet (IMDUR) 120 milliGRAM(s) Oral daily  pantoprazole    Tablet 40 milliGRAM(s) Oral before breakfast  simvastatin 10 milliGRAM(s) Oral at bedtime  sodium chloride 0.9%. 1000 milliLiter(s) (75 mL/Hr) IV Continuous <Continuous>  vancomycin    Solution 500 milliGRAM(s) Oral every 6 hours    MEDICATIONS  (PRN):  acetaminophen   Tablet 650 milliGRAM(s) Oral every 8 hours PRN For Temp greater than 38 C (100.4 F)  acetaminophen   Tablet. 650 milliGRAM(s) Oral every 8 hours PRN Mild Pain (1 - 3)  ALBUTerol   0.5% 2.5 milliGRAM(s) Nebulizer every 4 hours PRN Shortness of Breath and/or Wheezing  morphine  - Injectable 2 milliGRAM(s) IV Push every 6 hours PRN Severe Pain (7 - 10)  ondansetron Injectable 4 milliGRAM(s) IV Push every 6 hours PRN Nausea and/or Vomiting            Vital Signs Last 24 Hrs  T(C): 36.7 (08 Jul 2018 04:56), Max: 37.1 (07 Jul 2018 17:23)  T(F): 98 (08 Jul 2018 04:56), Max: 98.8 (07 Jul 2018 17:23)  HR: 75 (08 Jul 2018 04:56) (57 - 83)  BP: 134/21 (08 Jul 2018 04:56) (112/39 - 148/34)  BP(mean): --  RR: 18 (08 Jul 2018 04:56) (18 - 18)  SpO2: 93% (08 Jul 2018 04:56) (90% - 100%)            INTERPRETATION OF TELEMETRY: as above    ECG:        LABS:                        8.6    17.90 )-----------( 263      ( 08 Jul 2018 05:08 )             27.7     07-08    146<H>  |  117<H>  |  58<H>  ----------------------------<  113<H>  4.6   |  17<L>  |  3.12<H>    Ca    7.3<L>      08 Jul 2018 05:08    TPro  5.1<L>  /  Alb  1.9<L>  /  TBili  0.4  /  DBili  x   /  AST  10<L>  /  ALT  10<L>  /  AlkPhos  62  07-06    CARDIAC MARKERS ( 07 Jul 2018 17:51 )  0.024 ng/mL / x     / x     / x     / x      CARDIAC MARKERS ( 07 Jul 2018 02:32 )  0.041 ng/mL / x     / x     / x     / x      CARDIAC MARKERS ( 06 Jul 2018 23:20 )  0.042 ng/mL / x     / x     / x     / x          PT/INR - ( 06 Jul 2018 19:43 )   PT: 13.4 sec;   INR: 1.24 ratio         PTT - ( 06 Jul 2018 19:43 )  PTT:32.5 sec    I&O's Summary    07 Jul 2018 07:01  -  08 Jul 2018 06:55  --------------------------------------------------------  IN: 2403 mL / OUT: 2 mL / NET: 2401 mL      BNP  RADIOLOGY & ADDITIONAL STUDIES:

## 2018-07-08 NOTE — PROGRESS NOTE ADULT - ASSESSMENT
Pt is a 84 y/o M w/pmhx of Afib, CHF, CAD s/p stents, COPD, PNA, upper GI bleed (duodenal), PVD, s/p right lower extremity amputation,  recent hospitalization for pneumonia and subsequent Cdiff colitis admitted on 7/6 from assisted living for evaluation of persistent abdominal pain, fever and diarrhea that has been ongoing despite the po vancomycin course that the patient had been on. The patient also notes mid epigastric/chest pain. History per daughter at bedside and medical record as patient is poor historian.  1. Patient admitted with severe Cdiff pan colitis, also noted with leukocytosis most likely reactive to Cdiff infection  - follow up cultures   - iv hydration and supportive care   - serial cbc and monitor temperature   - day #2 vancomycin po 500 mg po q 6 hours  - continue contact isolation  - limit antibiotics exposure  2. other issues: Afib, CHF, CAD s/p stents, COPD, PNA, upper GI bleed (duodenal), PVD, s/p right lower extremity amputation  - per medicine

## 2018-07-08 NOTE — PROGRESS NOTE ADULT - SUBJECTIVE AND OBJECTIVE BOX
Pt is a 84 y/o M w/pmhx of Afib, CHF, CAD s/p stents, COPD, PNA, upper GI bleed (duodenal)  BIB EMS from [facility] for fever, abd pain, and diarrhea that began 3 weeks ago. Was in the hospital for PNA tx and was later dx with C-diff and started on a course of PO vancomycin for 10 days for the C-diff. States that he has had diarrhea since before the course of abx. Pain has been increased for the past few weeks and is characterized as a cramping feeling. Daughters also note that there is increased distension. Also notes chest pain characterized as a cramping in the central chest. 83% O2 sat noted this morning at the assisted living facility. Takes O2 routinely at night but not during the day.      07/07/18: Patient seen and examined. Still with abd pain.   07/08/18: patient seen and examined. Diarrhea improving, less abd pain as per patient. Discussed with patient regarding management plan. Discussed with Dr Tomas.       Vital Signs Last 24 Hrs  T(C): 36.7 (08 Jul 2018 11:40), Max: 37.1 (07 Jul 2018 17:23)  T(F): 98.1 (08 Jul 2018 11:40), Max: 98.8 (07 Jul 2018 17:23)  HR: 74 (08 Jul 2018 11:40) (57 - 83)  BP: 137/33 (08 Jul 2018 11:40) (112/39 - 148/34)  BP(mean): --  RR: 16 (08 Jul 2018 09:22) (16 - 18)  SpO2: 92% (08 Jul 2018 09:22) (90% - 97%)        PHYSICAL EXAM:   · CONSTITUTIONAL: Elderly white male, awake, alert, oriented to person, place, time/situation and in no apparent respiratory distress. + ill appearing	  · ENMT: Airway patent, Nasal mucosa clear. Mouth with mildly dry MM.	  · EYES: Clear bilaterally, pupils equal, round and reactive to light. EOMI	  · CARDIAC: Tachycardic, regular rhythm.	  · RESPIRATORY: Decreased breathe sounds at the right base, no crackles, wheezing or retractions.	  · GASTROINTESTINAL: Hypoactive bowel sounds with normal pitch. Diffuse TTP especially on the right side. + distension. No hyper tympany on percussion.	  · MUSCULOSKELETAL: right BKA. LLE SLR 10 degrees. MAEx4. No edema	  · SKIN: +tactile warmth, on the lateral aspect of the LLE has a sore with purulent discharge.	            Labs:                                            8.6    17.90 )-----------( 263      ( 08 Jul 2018 05:08 )             27.7     08 Jul 2018 05:08    146    |  117    |  58     ----------------------------<  113    4.6     |  17     |  3.12     Ca    7.3        08 Jul 2018 05:08        PT/INR - ( 06 Jul 2018 19:43 )   PT: 13.4 sec;   INR: 1.24 ratio         PTT - ( 06 Jul 2018 19:43 )  PTT:32.5 sec  CAPILLARY BLOOD GLUCOSE        CARDIAC MARKERS ( 07 Jul 2018 17:51 )  0.024 ng/mL / x     / x     / x     / x      CARDIAC MARKERS ( 07 Jul 2018 02:32 )  0.041 ng/mL / x     / x     / x     / x      CARDIAC MARKERS ( 06 Jul 2018 23:20 )  0.042 ng/mL / x     / x     / x     / x                    MEDICATIONS:      MEDICATIONS  (STANDING):  allopurinol 100 milliGRAM(s) Oral daily  buDESOnide   0.5 milliGRAM(s) Respule 0.5 milliGRAM(s) Inhalation two times a day  dextrose 5% 1000 milliLiter(s) (75 mL/Hr) IV Continuous <Continuous>  diltiazem    Tablet 30 milliGRAM(s) Oral every 6 hours  doxazosin 8 milliGRAM(s) Oral at bedtime  ferrous    sulfate 325 milliGRAM(s) Oral daily  finasteride 5 milliGRAM(s) Oral daily  hydrALAZINE 50 milliGRAM(s) Oral two times a day  isosorbide   mononitrate ER Tablet (IMDUR) 120 milliGRAM(s) Oral daily  metoprolol tartrate 25 milliGRAM(s) Oral two times a day  metroNIDAZOLE  IVPB      metroNIDAZOLE  IVPB 500 milliGRAM(s) IV Intermittent every 8 hours  simvastatin 10 milliGRAM(s) Oral at bedtime  vancomycin    Solution 500 milliGRAM(s) Oral every 6 hours    MEDICATIONS  (PRN):  acetaminophen   Tablet 650 milliGRAM(s) Oral every 8 hours PRN For Temp greater than 38 C (100.4 F)  acetaminophen   Tablet. 650 milliGRAM(s) Oral every 8 hours PRN Mild Pain (1 - 3)  ALBUTerol   0.5% 2.5 milliGRAM(s) Nebulizer every 4 hours PRN Shortness of Breath and/or Wheezing  morphine  - Injectable 2 milliGRAM(s) IV Push every 6 hours PRN Severe Pain (7 - 10)  ondansetron Injectable 4 milliGRAM(s) IV Push every 6 hours PRN Nausea and/or Vomiting            Assessment and Plan:   Assessment:  · Assessment		  82 yo male presented with diarrhea, fever and abdominal pain.    A/P:    1.  Pseudomembranous colitis, H/O recent antibiotic use for pneumonia  -keep NPO except meds   -on IVF  -Increased PO Vancomycin, started on IV flagyl   -D/C Cipro IV  -Dr Schultz consult appreciated.  -Dr jin consult appreciated.    2.  A fib with RVR  -S/P Cardizem drip  -on PO Cardizem now   -due to very significant GI bleeding history in recent months no AC  -he has multiple vessel clipping for GI bleed   -cardiology follow up appreciated    3.  COPD  -stable  -Albuterol as needed.  -Consult Dr Maxwell    4.  Left LE wound  -chronic  -Local wound care    5.  Chr diastolic CHF-  Hold diuretics due to diarrhea and C diff colitis.    6.   HTN-stable  Continue hydralazine.

## 2018-07-08 NOTE — CONSULT NOTE ADULT - ASSESSMENT
Diarrhea, likely secondary C. difficile colitis.  Concern with increasing white blood cell count as well as abdominal pain.  Continue high-dose by mouth vancomycin, agree with holding Cipro.  Would add Flagyl IV.  Additionally, although patient has had a history of GI bleeding, would hold Protonix for analysis symptoms of C. difficile colitis are likely getting worse.    Atrial fibrillation with rapid ventricular rhythm, rate controlled.  Clinical follow-up, his colitis appears to be more diffuse and unlikely that he's had embolic disease to the bowel however, he is at risk for this with his atrial fibrillation without anticoagulation.  I do not see any manifestations of peripheral embolic disease at this time.    Left lower extremity wound, chronic.    Gentle hydration, monitor for heart failure.    Rising white count and rising creatinine is concerning.  Would check lactate and if elevated, these obtain surgical consult.

## 2018-07-09 LAB
ADD ON TEST-SPECIMEN IN LAB: SIGNIFICANT CHANGE UP
ADD ON TEST-SPECIMEN IN LAB: SIGNIFICANT CHANGE UP
ANION GAP SERPL CALC-SCNC: 13 MMOL/L — SIGNIFICANT CHANGE UP (ref 5–17)
BASE EXCESS BLDA CALC-SCNC: -10.6 MMOL/L — LOW (ref -2–2)
BLOOD GAS COMMENTS ARTERIAL: SIGNIFICANT CHANGE UP
BUN SERPL-MCNC: 65 MG/DL — HIGH (ref 7–23)
CALCIUM SERPL-MCNC: 7.1 MG/DL — LOW (ref 8.5–10.1)
CHLORIDE SERPL-SCNC: 114 MMOL/L — HIGH (ref 96–108)
CO2 SERPL-SCNC: 17 MMOL/L — LOW (ref 22–31)
CREAT SERPL-MCNC: 3.9 MG/DL — HIGH (ref 0.5–1.3)
GAS PNL BLDA: SIGNIFICANT CHANGE UP
GLUCOSE SERPL-MCNC: 113 MG/DL — HIGH (ref 70–99)
HCO3 BLDA-SCNC: 16 MMOL/L — LOW (ref 21–29)
HCT VFR BLD CALC: 25.6 % — LOW (ref 39–50)
HGB BLD-MCNC: 8.1 G/DL — LOW (ref 13–17)
LACTATE SERPL-SCNC: 0.9 MMOL/L — SIGNIFICANT CHANGE UP (ref 0.7–2)
LDH SERPL L TO P-CCNC: 219 U/L — SIGNIFICANT CHANGE UP (ref 84–241)
MAGNESIUM SERPL-MCNC: 1.9 MG/DL — SIGNIFICANT CHANGE UP (ref 1.6–2.6)
MCHC RBC-ENTMCNC: 28.9 PG — SIGNIFICANT CHANGE UP (ref 27–34)
MCHC RBC-ENTMCNC: 31.6 GM/DL — LOW (ref 32–36)
MCV RBC AUTO: 91.4 FL — SIGNIFICANT CHANGE UP (ref 80–100)
NRBC # BLD: 1 /100 WBCS — HIGH (ref 0–0)
PCO2 BLDA: 42 MMHG — SIGNIFICANT CHANGE UP (ref 32–46)
PH BLDA: 7.21 — LOW (ref 7.35–7.45)
PLATELET # BLD AUTO: 239 K/UL — SIGNIFICANT CHANGE UP (ref 150–400)
PO2 BLDA: 67 MMHG — LOW (ref 74–108)
POTASSIUM SERPL-MCNC: 4.5 MMOL/L — SIGNIFICANT CHANGE UP (ref 3.5–5.3)
POTASSIUM SERPL-SCNC: 4.5 MMOL/L — SIGNIFICANT CHANGE UP (ref 3.5–5.3)
RBC # BLD: 2.8 M/UL — LOW (ref 4.2–5.8)
RBC # FLD: 20 % — HIGH (ref 10.3–14.5)
SAO2 % BLDA: 91 % — LOW (ref 92–96)
SODIUM SERPL-SCNC: 144 MMOL/L — SIGNIFICANT CHANGE UP (ref 135–145)
WBC # BLD: 20.78 K/UL — HIGH (ref 3.8–10.5)
WBC # FLD AUTO: 20.78 K/UL — HIGH (ref 3.8–10.5)

## 2018-07-09 PROCEDURE — 74176 CT ABD & PELVIS W/O CONTRAST: CPT | Mod: 26

## 2018-07-09 PROCEDURE — 99222 1ST HOSP IP/OBS MODERATE 55: CPT

## 2018-07-09 RX ORDER — HYDROMORPHONE HYDROCHLORIDE 2 MG/ML
1 INJECTION INTRAMUSCULAR; INTRAVENOUS; SUBCUTANEOUS
Qty: 0 | Refills: 0 | Status: DISCONTINUED | OUTPATIENT
Start: 2018-07-09 | End: 2018-07-09

## 2018-07-09 RX ORDER — HYDROMORPHONE HYDROCHLORIDE 2 MG/ML
0.5 INJECTION INTRAMUSCULAR; INTRAVENOUS; SUBCUTANEOUS EVERY 6 HOURS
Qty: 0 | Refills: 0 | Status: DISCONTINUED | OUTPATIENT
Start: 2018-07-09 | End: 2018-07-16

## 2018-07-09 RX ADMIN — Medication 500 MILLIGRAM(S): at 23:36

## 2018-07-09 RX ADMIN — HYDROMORPHONE HYDROCHLORIDE 1 MILLIGRAM(S): 2 INJECTION INTRAMUSCULAR; INTRAVENOUS; SUBCUTANEOUS at 13:30

## 2018-07-09 RX ADMIN — Medication 500 MILLIGRAM(S): at 05:51

## 2018-07-09 RX ADMIN — ONDANSETRON 4 MILLIGRAM(S): 8 TABLET, FILM COATED ORAL at 10:24

## 2018-07-09 RX ADMIN — Medication 500 MILLIGRAM(S): at 17:46

## 2018-07-09 RX ADMIN — Medication 0.5 MILLIGRAM(S): at 07:43

## 2018-07-09 RX ADMIN — MORPHINE SULFATE 2 MILLIGRAM(S): 50 CAPSULE, EXTENDED RELEASE ORAL at 11:00

## 2018-07-09 RX ADMIN — HYDROMORPHONE HYDROCHLORIDE 1 MILLIGRAM(S): 2 INJECTION INTRAMUSCULAR; INTRAVENOUS; SUBCUTANEOUS at 12:18

## 2018-07-09 RX ADMIN — Medication 500 MILLIGRAM(S): at 12:18

## 2018-07-09 RX ADMIN — SIMVASTATIN 10 MILLIGRAM(S): 20 TABLET, FILM COATED ORAL at 21:36

## 2018-07-09 RX ADMIN — HYDROMORPHONE HYDROCHLORIDE 0.5 MILLIGRAM(S): 2 INJECTION INTRAMUSCULAR; INTRAVENOUS; SUBCUTANEOUS at 23:36

## 2018-07-09 RX ADMIN — SODIUM CHLORIDE 125 MILLILITER(S): 9 INJECTION, SOLUTION INTRAVENOUS at 12:18

## 2018-07-09 RX ADMIN — Medication 650 MILLIGRAM(S): at 21:37

## 2018-07-09 RX ADMIN — Medication 100 MILLIGRAM(S): at 05:52

## 2018-07-09 RX ADMIN — Medication 0.5 MILLIGRAM(S): at 20:42

## 2018-07-09 RX ADMIN — MORPHINE SULFATE 2 MILLIGRAM(S): 50 CAPSULE, EXTENDED RELEASE ORAL at 10:23

## 2018-07-09 RX ADMIN — Medication 25 MILLIGRAM(S): at 05:51

## 2018-07-09 RX ADMIN — Medication 100 MILLIGRAM(S): at 15:08

## 2018-07-09 RX ADMIN — Medication 100 MILLIGRAM(S): at 21:36

## 2018-07-09 RX ADMIN — ALBUTEROL 2.5 MILLIGRAM(S): 90 AEROSOL, METERED ORAL at 07:41

## 2018-07-09 RX ADMIN — Medication 650 MILLIGRAM(S): at 22:30

## 2018-07-09 RX ADMIN — Medication 25 MILLIGRAM(S): at 17:45

## 2018-07-09 RX ADMIN — Medication 8 MILLIGRAM(S): at 21:36

## 2018-07-09 NOTE — PHYSICAL THERAPY INITIAL EVALUATION ADULT - LEVEL OF INDEPENDENCE: SUPINE/SIT, REHAB EVAL
Did not attempt OOB at this time. Recommended to RN to attempt OOB with akira for pt and staff safety.

## 2018-07-09 NOTE — PROGRESS NOTE ADULT - SUBJECTIVE AND OBJECTIVE BOX
Patient is a 83y old  Male who presents with a chief complaint of complain of diarrhea, fever.     HPI:  Pt is a 84 y/o M w/pmhx of Afib, CHF, CAD s/p stents, COPD, PNA, upper GI bleed (duodenal)  BIB EMS from [facility] assisted living for fever, abd pain, and diarrhea that began 3 weeks ago. Was in the hospital for PNA tx and was later dx with C-diff and started on a course of PO vancomycin for 10 days for the C-diff. States that he has had diarrhea since before the course of abx. Pain has been increased for the past few weeks and is characterized as a cramping feeling. Daughters also note that there is increased distension.     Pt's daughter at bedside this am. Pt denies any CP or SOB.    Diarrhea resolving.     PAST MEDICAL & SURGICAL HISTORY:  Diastolic CHF  Peripheral vascular disease  Afib  Anemia  CKD (chronic kidney disease)  COPD (chronic obstructive pulmonary disease)  SHAYY (obstructive sleep apnea)  Sepsis, due to unspecified organism: 2/2 poorly healing wounds b/l  Dyspepsia: On moderate exertion.  Sleep apnea, obstructive: Requires home 02 therapy, and treatment with BIPAP  Atelectasis  Pleural effusion, bilateral  Respiratory failure  Peripheral edema  CRI (chronic renal insufficiency)  Gout  Benign prostatic hypertrophy  Spinal stenosis  Hypercholesterolemia  GERD (gastroesophageal reflux disease)  CAD (coronary artery disease)  Hypertension  S/P angioplasty with stent  Cataract of left eye  Prostate: Surgery green light procedure.  S/P rotator cuff surgery: Right  S/P angioplasty      MEDICATIONS  (STANDING):  allopurinol 100 milliGRAM(s) Oral daily  buDESOnide   0.5 milliGRAM(s) Respule 0.5 milliGRAM(s) Inhalation two times a day  dextrose 5% 1000 milliLiter(s) (75 mL/Hr) IV Continuous <Continuous>  diltiazem    Tablet 30 milliGRAM(s) Oral every 6 hours  doxazosin 8 milliGRAM(s) Oral at bedtime  ferrous    sulfate 325 milliGRAM(s) Oral daily  finasteride 5 milliGRAM(s) Oral daily  hydrALAZINE 50 milliGRAM(s) Oral two times a day  isosorbide   mononitrate ER Tablet (IMDUR) 120 milliGRAM(s) Oral daily  metoprolol tartrate 25 milliGRAM(s) Oral two times a day  metroNIDAZOLE  IVPB      metroNIDAZOLE  IVPB 500 milliGRAM(s) IV Intermittent every 8 hours  simvastatin 10 milliGRAM(s) Oral at bedtime  vancomycin    Solution 500 milliGRAM(s) Oral every 6 hours    MEDICATIONS  (PRN):  acetaminophen   Tablet 650 milliGRAM(s) Oral every 8 hours PRN For Temp greater than 38 C (100.4 F)  acetaminophen   Tablet. 650 milliGRAM(s) Oral every 8 hours PRN Mild Pain (1 - 3)  ALBUTerol   0.5% 2.5 milliGRAM(s) Nebulizer every 4 hours PRN Shortness of Breath and/or Wheezing  morphine  - Injectable 2 milliGRAM(s) IV Push every 6 hours PRN Severe Pain (7 - 10)  ondansetron Injectable 4 milliGRAM(s) IV Push every 6 hours PRN Nausea and/or Vomiting      FAMILY HISTORY:  No pertinent family history in first degree relatives      SOCIAL HISTORY:    REVIEW OF SYSTEMS:  CONSTITUTIONAL:    No fatigue, malaise, lethargy.  No fever or chills.  HEENT:  Eyes:  No visual changes.     ENT:  No epistaxis.  No sinus pain.    RESPIRATORY:  No cough.  No wheeze.  No hemoptysis.  No shortness of breath.  CARDIOVASCULAR:  No chest pains.  No palpitations. No shortness of breath, No orthopnea or PND.  GASTROINTESTINAL:  c/o abdominal pain.  No nausea or vomiting.    GENITOURINARY:    No hematuria.    MUSCULOSKELETAL:  No musculoskeletal pain.  No joint swelling.  No arthritis.  NEUROLOGICAL:  No tingling or numbness or weakness.  PSYCHIATRIC:  No confusion  SKIN:  No rashes.    ENDOCRINE:  No unexplained weight loss.  No polydipsia.   HEMATOLOGIC:  No anemia.  No prolonged or excessive bleeding.   ALLERGIC AND IMMUNOLOGIC:  No pruritus.          Vital Signs Last 24 Hrs  T(C): 36.4 (09 Jul 2018 04:00), Max: 36.7 (08 Jul 2018 11:40)  T(F): 97.6 (09 Jul 2018 04:00), Max: 98.1 (08 Jul 2018 11:40)  HR: 66 (09 Jul 2018 07:40) (66 - 80)  BP: 115/23 (09 Jul 2018 04:00) (109/28 - 137/33)  BP(mean): --  RR: 16 (09 Jul 2018 04:00) (16 - 18)  SpO2: 93% (09 Jul 2018 04:00) (91% - 95%)    PHYSICAL EXAM-    Constitutional: ill looking elderly male in no acute distress.     Head: Head is normocephalic and atraumatic.      Neck: No jugular venous distention. No audible carotid bruits. There are strong carotid pulses bilaterally. No JVD.     Cardiovascular: Regular rate and rhythm without S3, S4. No murmurs or rubs are appreciated.      Respiratory: Breath sounds are normal. No rales. No wheezing.    Abdomen: Soft, nontender, distended with positive bowel sounds.      Extremity: No tenderness. No  pitting edema     Neurologic: The patient is alert and oriented.      Skin: No rash, no obvious lesions noted.      Psychiatric: The patient appears to be emotionally stable.      INTERPRETATION OF TELEMETRY: sinus rythm with PVCs, bigeminy, NSVT    ECG:    I&O's Detail      LABS:                        8.1    20.78 )-----------( 239      ( 09 Jul 2018 05:48 )             25.6     07-09    144  |  114<H>  |  65<H>  ----------------------------<  113<H>  4.5   |  17<L>  |  3.90<H>    Ca    7.1<L>      09 Jul 2018 05:48  Mg     1.9     07-09      CARDIAC MARKERS ( 07 Jul 2018 17:51 )  0.024 ng/mL / x     / x     / x     / x              I&O's Summary    BNP  RADIOLOGY & ADDITIONAL STUDIES:

## 2018-07-09 NOTE — PROGRESS NOTE ADULT - SUBJECTIVE AND OBJECTIVE BOX
Pt is a 82 y/o M w/pmhx of Afib, CHF, CAD s/p stents, COPD, PNA, upper GI bleed (duodenal)  BIB EMS from [facility] for fever, abd pain, and diarrhea that began 3 weeks ago. Was in the hospital for PNA tx and was later dx with C-diff and started on a course of PO vancomycin for 10 days for the C-diff. States that he has had diarrhea since before the course of abx. Pain has been increased for the past few weeks and is characterized as a cramping feeling. Daughters also note that there is increased distension. Also notes chest pain characterized as a cramping in the central chest. 83% O2 sat noted this morning at the assisted living facility. Takes O2 routinely at night but not during the day.      07/07/18: Patient seen and examined. Still with abd pain.   07/08/18: patient seen and examined. Diarrhea improving, less abd pain as per patient. Discussed with patient regarding management plan. Discussed with Dr Wu.   07/09/18: Patient seen and examined. Sleepy after IV dilaudid. Discussed with 2 daughters at bed side regarding management plan. Discussed with Dr Jin, Dr Schultz and Dr wu. Discussed with radiologist regarding  repeat CT abd/pel: No worsening colitis. Patient now with mild to mod ascites.       Vital Signs Last 24 Hrs  T(C): 36.6 (09 Jul 2018 10:39), Max: 36.6 (09 Jul 2018 10:39)  T(F): 97.8 (09 Jul 2018 10:39), Max: 97.8 (09 Jul 2018 10:39)  HR: 62 (09 Jul 2018 10:39) (62 - 73)  BP: 123/34 (09 Jul 2018 10:39) (109/28 - 133/30)  BP(mean): --  RR: 18 (09 Jul 2018 10:39) (16 - 18)  SpO2: 95% (09 Jul 2018 10:39) (91% - 95%)        PHYSICAL EXAM:   · CONSTITUTIONAL: Elderly white male, awake, alert, oriented to person, place, time/situation and in no apparent respiratory distress. + ill appearing	  · ENMT: Airway patent, Nasal mucosa clear. Mouth with mildly dry MM.	  · EYES: Clear bilaterally, pupils equal, round and reactive to light. EOMI	  · CARDIAC: Tachycardic, regular rhythm.	  · RESPIRATORY: Decreased breathe sounds at the right base, no crackles, wheezing or retractions.	  · GASTROINTESTINAL: Hypoactive bowel sounds with normal pitch. Diffuse TTP especially on the right side. + distension. No hyper tympany on percussion.	  · MUSCULOSKELETAL: right BKA. LLE SLR 10 degrees. MAEx4. No edema	  · SKIN: +tactile warmth, on the lateral aspect of the LLE has a sore with purulent discharge.	            Labs:                                              8.1    20.78 )-----------( 239      ( 09 Jul 2018 05:48 )             25.6     09 Jul 2018 05:48    144    |  114    |  65     ----------------------------<  113    4.5     |  17     |  3.90     Ca    7.1        09 Jul 2018 05:48  Mg     1.9       09 Jul 2018 05:48          CAPILLARY BLOOD GLUCOSE        CARDIAC MARKERS ( 07 Jul 2018 17:51 )  0.024 ng/mL / x     / x     / x     / x                      MEDICATIONS:      MEDICATIONS  (STANDING):  allopurinol 100 milliGRAM(s) Oral daily  buDESOnide   0.5 milliGRAM(s) Respule 0.5 milliGRAM(s) Inhalation two times a day  dextrose 5% 1000 milliLiter(s) (125 mL/Hr) IV Continuous <Continuous>  diltiazem    Tablet 30 milliGRAM(s) Oral every 6 hours  doxazosin 8 milliGRAM(s) Oral at bedtime  ferrous    sulfate 325 milliGRAM(s) Oral daily  finasteride 5 milliGRAM(s) Oral daily  isosorbide   mononitrate ER Tablet (IMDUR) 120 milliGRAM(s) Oral daily  metoprolol tartrate 25 milliGRAM(s) Oral two times a day  metroNIDAZOLE  IVPB      metroNIDAZOLE  IVPB 500 milliGRAM(s) IV Intermittent every 8 hours  simvastatin 10 milliGRAM(s) Oral at bedtime  vancomycin    Solution 500 milliGRAM(s) Oral every 6 hours    MEDICATIONS  (PRN):  acetaminophen   Tablet 650 milliGRAM(s) Oral every 8 hours PRN For Temp greater than 38 C (100.4 F)  acetaminophen   Tablet. 650 milliGRAM(s) Oral every 8 hours PRN Mild Pain (1 - 3)  ALBUTerol   0.5% 2.5 milliGRAM(s) Nebulizer every 4 hours PRN Shortness of Breath and/or Wheezing  HYDROmorphone  Injectable 0.5 milliGRAM(s) IV Push every 6 hours PRN Moderate Pain (4 - 6)  ondansetron Injectable 4 milliGRAM(s) IV Push every 6 hours PRN Nausea and/or Vomiting          Assessment and Plan:   Assessment:  · Assessment		  84 yo male presented with diarrhea, fever and abdominal pain.    A/P:    1.  Pseudomembranous colitis, H/O recent antibiotic use for pneumonia  Worsening leukocytosis  -keep NPO except meds   -Continue IVF  -Increased PO Vancomycin, started on IV flagyl   -Dr Schultz and Dr jin follow up appreciated.  -Dr Rashid consult appreciated.  -Follow WBC      2.  A fib with RVR  -S/P Cardizem drip  -on PO Cardizem now   -due to very significant GI bleeding history in recent months no AC  -he has multiple vessel clipping for GI bleed   -cardiology follow up appreciated    3.  COPD  -Dr Arnold consult appreciated  -Albuterol as needed.  -O2 support    4.  Left LE wound  -chronic  -Local wound care    5.  Chr diastolic CHF-  Hold diuretics due to diarrhea and C diff colitis.    6.   HTN-stable  On lopressor    7.   BPH-continue  flomax.     Prognosis-guarded  Discussed with family members at bed side.

## 2018-07-09 NOTE — PROGRESS NOTE ADULT - SUBJECTIVE AND OBJECTIVE BOX
Patient is a 83y old  Male who presents with a chief complaint of complain of diarrhea, fever (06 Jul 2018 23:55)    Date of service: 07-09-18 @ 11:37  Patient has distended and tender abdomen; lethargic  ROS: unable to obtain secondary to patient medical condition     MEDICATIONS  (STANDING):  allopurinol 100 milliGRAM(s) Oral daily  buDESOnide   0.5 milliGRAM(s) Respule 0.5 milliGRAM(s) Inhalation two times a day  dextrose 5% 1000 milliLiter(s) (125 mL/Hr) IV Continuous <Continuous>  diltiazem    Tablet 30 milliGRAM(s) Oral every 6 hours  doxazosin 8 milliGRAM(s) Oral at bedtime  ferrous    sulfate 325 milliGRAM(s) Oral daily  finasteride 5 milliGRAM(s) Oral daily  HYDROmorphone  Injectable 1 milliGRAM(s) IV Push four times a day  isosorbide   mononitrate ER Tablet (IMDUR) 120 milliGRAM(s) Oral daily  metoprolol tartrate 25 milliGRAM(s) Oral two times a day  metroNIDAZOLE  IVPB      metroNIDAZOLE  IVPB 500 milliGRAM(s) IV Intermittent every 8 hours  simvastatin 10 milliGRAM(s) Oral at bedtime  vancomycin    Solution 500 milliGRAM(s) Oral every 6 hours    MEDICATIONS  (PRN):  acetaminophen   Tablet 650 milliGRAM(s) Oral every 8 hours PRN For Temp greater than 38 C (100.4 F)  acetaminophen   Tablet. 650 milliGRAM(s) Oral every 8 hours PRN Mild Pain (1 - 3)  ALBUTerol   0.5% 2.5 milliGRAM(s) Nebulizer every 4 hours PRN Shortness of Breath and/or Wheezing  ondansetron Injectable 4 milliGRAM(s) IV Push every 6 hours PRN Nausea and/or Vomiting      Vital Signs Last 24 Hrs  T(C): 36.6 (09 Jul 2018 10:39), Max: 36.7 (08 Jul 2018 11:40)  T(F): 97.8 (09 Jul 2018 10:39), Max: 98.1 (08 Jul 2018 11:40)  HR: 62 (09 Jul 2018 10:39) (62 - 74)  BP: 123/34 (09 Jul 2018 10:39) (109/28 - 137/33)  BP(mean): --  RR: 18 (09 Jul 2018 10:39) (16 - 18)  SpO2: 95% (09 Jul 2018 10:39) (91% - 95%)    Physical Exam:      PE:    Constitutional: frail looking  HEENT: NC/AT, EOMI, PERRLA, conjunctivae clear; ears and nose atraumatic; pharynx clear  Neck: supple; thyroid not palpable  Respiratory: respiratory effort normal; clear to auscultation  Cardiovascular: S1S2 regular, no murmurs  Abdomen: soft, difusely tender, difusely distended, positive BS; no liver or spleen organomegaly  Genitourinary: no suprapubic tenderness  Musculoskeletal: s/p right lower extremity amputation; left lower extremity with dressing in place  Neurological/ Psychiatric: AxOx3, judgement and insight normal;  moving all extremities  Skin: no rashes; no palpable lesions    Labs: all available labs reviewed                        Labs:                        Labs:                        8.1    20.78 )-----------( 239      ( 09 Jul 2018 05:48 )             25.6     07-09    144  |  114<H>  |  65<H>  ----------------------------<  113<H>  4.5   |  17<L>  |  3.90<H>    Ca    7.1<L>      09 Jul 2018 05:48  Mg     1.9     07-09             Cultures:       Culture - Blood (collected 07-06-18 @ 13:42)  Source: .Blood Blood-Peripheral  Preliminary Report (07-07-18 @ 18:03):    No growth to date.    Culture - Blood (collected 07-06-18 @ 13:35)  Source: .Blood Blood-Peripheral  Preliminary Report (07-07-18 @ 18:03):    No growth to date.                 Clostridium difficile Toxin by PCR (07.06.18 @ 16:19)    C Diff by PCR Result: Detected    Clostridium difficile Toxin by PCR: The results of this test should be interpreted with consideration of all  clinical and laboratory findings. This test determines the presence of  the C. difficile tcdB gene at a given time and is not intended to  identify antibiotic associated disease or C. difficile infection without  clinical context. Successful treatment is based on the resolution of  clinical symptoms. This test should not be used as a "test of cure"  because C. difficile DNA will persist after successful treatment. Repeat  testing will not be permitted.    This test is performed on the BD MAX system using Real-Time PCR and  fluorogenic target-specific hybridization.        < from: CT Abdomen and Pelvis w/ Oral Cont (07.06.18 @ 18:03) >    IMPRESSION:     Severe pancolitis consistent with pseudomembranous colitis.    Mild pulmonary edema.    Small loculated right and trace layering left pleural effusions.      < end of copied text >          Radiology: all available radiological tests reviewed    Advanced directives addressed: full resuscitation

## 2018-07-09 NOTE — PHYSICAL THERAPY INITIAL EVALUATION ADULT - ADDITIONAL COMMENTS
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 Pt utilizes sliding board for transfers OOB to w/c. Has never used prosthetic.

## 2018-07-09 NOTE — PHYSICAL THERAPY INITIAL EVALUATION ADULT - PERTINENT HX OF CURRENT PROBLEM, REHAB EVAL
Pt admitted to  secondary to abd pain, diarrhea. Pt was recently hospitalized for PN and was also dx with c-diff. H/H-8.1/25.6. Pt admitted to  secondary to abd pain, diarrhea. Pt was recently hospitalized for PN and was also dx with c-diff. H/H-8.1/25.6. While in hospital pt was transferred to ICU, shiley was placed, and dialysis performed in ICU 7/10.

## 2018-07-09 NOTE — CONSULT NOTE ADULT - ASSESSMENT
1) Clostridium Difficile Colitis  2) Metabolic Acidosis  3) Restrictive Ventilatory Disease  4) SHAYY  5) Bilateral Pleural Effusions    84 y/o M w/pmhx of Afib, CHF, CAD s/p stents, COPD, PNA, upper GI bleed (duodenal)  BIB EMS from [facility] assisted living for fever, abd pain, and diarrhea that began 3 weeks ago. Was in the hospital for ESBL and was later dx with C-diff and started on a course of PO vancomycin for 10 days for the C-diff. At the present time has diffuse abdominal pain, being treated Vancomycin and IV Metronidazole  ABG reviewed and reveals 7.18/39/68, LA normal limits  Etiology likely from sepsis  Will order repeat ABG  Diuresis has been held in light of increasing Cr  If clinical status does not improve, would recommend IR guided thoracentesis of the right pleural effusion  At the present time, patient states he would like to be a full code  Continue monitoring hemodynamics (daughter states baseline diastolic tends to run between 25-40mmHg)   Monitor urine output aggressively   Appreciate nephrology/cardiology/ID/GI/hospitalist recommendations  Will continue to monitor     Thank you for the consult 1) Clostridium Difficile Colitis  2) Metabolic Acidosis  3) Restrictive Ventilatory Disease  4) SHAYY  5) Bilateral Pleural Effusions    84 y/o M w/pmhx of Afib, CHF, CAD s/p stents, COPD, PNA, upper GI bleed (duodenal)  BIB EMS from [facility] assisted living for fever, abd pain, and diarrhea that began 3 weeks ago. Was in the hospital for ESBL and was later dx with C-diff and started on a course of PO vancomycin for 10 days for the C-diff. At the present time has diffuse abdominal pain, being treated Vancomycin and IV Metronidazole  ABG reviewed and reveals 7.18/39/68, LA normal limits  Etiology likely from sepsis  Will order repeat ABG  Diuresis has been held in light of increasing Cr  If clinical status does not improve, would recommend IR guided thoracentesis of the right pleural effusion  At the present time, patient states he would like to be a full code  Continue monitoring hemodynamics (daughter states baseline diastolic tends to run between 25-40mmHg)   Monitor urine output aggressively   Used BIPAP/CPAP in the past (stopped as an outpatient after patient lost weight), may worsen intraabdominal pressures but if arrhythmias are present, may consider starting again.   Appreciate nephrology/cardiology/ID/GI/hospitalist recommendations  Will continue to monitor     Thank you for the consult

## 2018-07-09 NOTE — PROGRESS NOTE ADULT - SUBJECTIVE AND OBJECTIVE BOX
Patient is a 83y old  Male who presents with a chief complaint of complain of diarrhea, fever (06 Jul 2018 23:55)      HPI:  Pt is a 82 y/o M w/pmhx of Afib, CHF, CAD s/p stents, COPD, PNA, upper GI bleed (duodenal)  BIB EMS from [facility] assisted living for fever, abd pain, and diarrhea that began 3 weeks ago. Was in the hospital for PNA tx and was later dx with C-diff and started on a course of PO vancomycin for 10 days for the C-diff. States that he has had diarrhea since before the course of abx. Pain has been increased for the past few weeks and is characterized as a cramping feeling. Daughters also note that there is increased distension. Also notes chest pain characterized as a cramping in the central chest. 83% O2 sat noted this morning at the assisted living facility. Takes O2 routinely at night but not during the day. (06 Jul 2018 23:55)    History per patient and daughter. Case discussed with hospitalist. Additionally, colorectal surgery consultation obtained and case discussed with colorectal surgery,    Patient complains of lower abdominal pain. He recently received analgesics and feels that the pain is better. He is lethargic but arousable. Responds to questions appropriately. States that diarrhea has improved. Some nausea but negative vomiting. Increased abdominal distention.  Denies any chest pain  Describes the pain in the abdomen being lower abdomen, crampy, crescendo decrescendo without any radiation.        PAST MEDICAL & SURGICAL HISTORY:  Diastolic CHF  Peripheral vascular disease  Afib  Anemia  CKD (chronic kidney disease)  COPD (chronic obstructive pulmonary disease)  SHAYY (obstructive sleep apnea)  Sepsis, due to unspecified organism: 2/2 poorly healing wounds b/l  Dyspepsia: On moderate exertion.  Sleep apnea, obstructive: Requires home 02 therapy, and treatment with BIPAP  Atelectasis  Pleural effusion, bilateral  Respiratory failure  Peripheral edema  CRI (chronic renal insufficiency)  Gout  Benign prostatic hypertrophy  Spinal stenosis  Hypercholesterolemia  GERD (gastroesophageal reflux disease)  CAD (coronary artery disease)  Hypertension  S/P angioplasty with stent  Cataract of left eye  Prostate: Surgery green light procedure.  S/P rotator cuff surgery: Right  S/P angioplasty      MEDICATIONS  (STANDING):  allopurinol 100 milliGRAM(s) Oral daily  buDESOnide   0.5 milliGRAM(s) Respule 0.5 milliGRAM(s) Inhalation two times a day  dextrose 5% 1000 milliLiter(s) (75 mL/Hr) IV Continuous <Continuous>  diltiazem    Tablet 30 milliGRAM(s) Oral every 6 hours  doxazosin 8 milliGRAM(s) Oral at bedtime  ferrous    sulfate 325 milliGRAM(s) Oral daily  finasteride 5 milliGRAM(s) Oral daily  hydrALAZINE 50 milliGRAM(s) Oral two times a day  isosorbide   mononitrate ER Tablet (IMDUR) 120 milliGRAM(s) Oral daily  metoprolol tartrate 25 milliGRAM(s) Oral two times a day  metroNIDAZOLE  IVPB      metroNIDAZOLE  IVPB 500 milliGRAM(s) IV Intermittent every 8 hours  simvastatin 10 milliGRAM(s) Oral at bedtime  vancomycin    Solution 500 milliGRAM(s) Oral every 6 hours    MEDICATIONS  (PRN):  acetaminophen   Tablet 650 milliGRAM(s) Oral every 8 hours PRN For Temp greater than 38 C (100.4 F)  acetaminophen   Tablet. 650 milliGRAM(s) Oral every 8 hours PRN Mild Pain (1 - 3)  ALBUTerol   0.5% 2.5 milliGRAM(s) Nebulizer every 4 hours PRN Shortness of Breath and/or Wheezing  morphine  - Injectable 2 milliGRAM(s) IV Push every 6 hours PRN Severe Pain (7 - 10)  ondansetron Injectable 4 milliGRAM(s) IV Push every 6 hours PRN Nausea and/or Vomiting      Allergies    Zosyn (Rash)    Intolerances        SOCIAL HISTORY:NC    FAMILY HISTORY:  No pertinent family history in first degree relatives      REVIEW OF SYSTEMS:    CONSTITUTIONAL: pos weakness,   EYES/ENT: No visual changes;  No vertigo or throat pain   NECK: No pain or stiffness  RESPIRATORY: No cough, wheezing, hemoptysis; No shortness of breath  CARDIOVASCULAR: No chest pain or palpitations  GENITOURINARY: No dysuria, frequency or hematuria  NEUROLOGICAL: No numbness or weakness  SKIN: No itching, burning, rashes, or lesions   All other review of systems is negative unless indicated above.    Vital Signs Last 24 Hrs  T(C): 36.4 (09 Jul 2018 04:00), Max: 36.7 (08 Jul 2018 11:40)  T(F): 97.6 (09 Jul 2018 04:00), Max: 98.1 (08 Jul 2018 11:40)  HR: 66 (09 Jul 2018 07:40) (66 - 80)  BP: 115/23 (09 Jul 2018 04:00) (109/28 - 137/33)  BP(mean): --  RR: 16 (09 Jul 2018 04:00) (16 - 18)  SpO2: 93% (09 Jul 2018 04:00) (91% - 95%)    PHYSICAL EXAM:    Constitutional:lethargix  HEENT: EOMI, throat clear  Neck: No LAD, supple  Respiratory: CTA and P  Cardiovascular: S1 and S2, RRR, no M  Gastrointestinal: BS+, soft, inc distension and RLQ tenderness, neg HSM,  Extremities: RLE amputationa nd edema in LLE    Neurological: A/O x 3,     Skin: No rashes    LABS:  CBC Full  -  ( 09 Jul 2018 05:48 )  WBC Count : 20.78 K/uL  Hemoglobin : 8.1 g/dL  Hematocrit : 25.6 %  Platelet Count - Automated : 239 K/uL  Mean Cell Volume : 91.4 fl  Mean Cell Hemoglobin : 28.9 pg  Mean Cell Hemoglobin Concentration : 31.6 gm/dL  Auto Neutrophil # : x  Auto Lymphocyte # : x  Auto Monocyte # : x  Auto Eosinophil # : x  Auto Basophil # : x  Auto Neutrophil % : x  Auto Lymphocyte % : x  Auto Monocyte % : x  Auto Eosinophil % : x  Auto Basophil % : x    07-09    144  |  114<H>  |  65<H>  ----------------------------<  113<H>  4.5   |  17<L>  |  3.90<H>    Ca    7.1<L>      09 Jul 2018 05:48  Mg     1.9     07-09      lactate Nl        RADIOLOGY & ADDITIONAL STUDIES:  < from: CT Abdomen and Pelvis w/ Oral Cont (07.06.18 @ 18:03) >  EXAM:  CT CHEST                          EXAM:  CT ABDOMEN AND PELVIS OC                            PROCEDURE DATE:  07/06/2018          INTERPRETATION:  CT ABDOMEN AND PELVIS OC, CT CHEST    HISTORY:  Generalized abdominal pain, tender, + diarrhea, + fever    Technique: CT of the chest, abdomen, and pelvis is performed with oral   without intravenous contrast. Axial images are supplemented with coronal   and sagittal reformations. This study was performed using automatic   exposure control (radiation dose reduction software) to obtain a   diagnostic image quality scan with patient dose as low as reasonably   achievable.    Contrast:     Oral contrast only    Comparison: CT abdomen and pelvis 3/7/2018, chest CT 2/21/2015    Findings:    LUNGS, AIRWAYS: The central airways are patent. There is bibasilar   compressive atelectasis. Mild pulmonary edema.  PLEURA: Small loculated right and trace layering left pleural effusion.  HEART AND VESSELS: Mild cardiomegaly. No pericardial effusion. Coronary   and aortic atherosclerosis without aneurysm.  MEDIASTINUM AND [place]: No adenopathy. Large, stable left goiter.  CHEST WALL: No masses.    LIVER: Stable peripheral low attenuation in the right lobe.  SPLEEN: Normal.  PANCREAS: Normal.  GALLBLADDER/BILIARY TREE: Nondilated. Gallstones. Gallbladder distention.  ADRENALS: Normal.  KIDNEYS: No hydronephrosis or urinary tract calculi.  LYMPHADENOPATHY/RETROPERITONEUM: No adenopathy.  VASCULATURE: Aortoiliac atherosclerosis without aneurysm. Infrarenal vena   cava filter in place.  BOWEL: Severe pancolitis with marked bowel wall thickening and   pericolonic stranding.  PELVIC VISCERA: Unremarkable prostate, seminal vesicles, and urinary   bladder.  PELVIC LYMPH NODES: No pelvic adenopathy.  PERITONEUM/ABDOMINAL WALL: Trace ascites. No free air.  SKELETAL: No acute bony abnormality.    IMPRESSION:     Severe pancolitis consistent with pseudomembranous colitis.    Mild pulmonary edema.    Small loculated right and trace layering left pleural effusions.                KENDRA GAING     < end of copied text >

## 2018-07-09 NOTE — CONSULT NOTE ADULT - ASSESSMENT
A/P - C diff colitis    Currently on Vancomycin 500mh po q6h and Flagyl IV.  Although symptomatically improved, WBC continues to rise.  Discussed with patient and daughter at bedside regarding potential need for subtotal colectomy with end ileostomy as well as attendant morbidity and mortality.  Will cont to follow.

## 2018-07-09 NOTE — PROGRESS NOTE ADULT - ASSESSMENT
Pt is a 84 y/o M w/pmhx of Afib, CHF, CAD s/p stents, COPD, PNA, upper GI bleed (duodenal), PVD, s/p right lower extremity amputation,  recent hospitalization for pneumonia and subsequent Cdiff colitis admitted on 7/6 from assisted living for evaluation of persistent abdominal pain, fever and diarrhea that has been ongoing despite the po vancomycin course that the patient had been on. The patient also notes mid epigastric/chest pain. History per daughter at bedside and medical record as patient is poor historian.  1. Patient admitted with severe Cdiff pan colitis, also noted with leukocytosis most likely reactive to Cdiff infection  - follow up cultures   - iv hydration and supportive care   - serial cbc and monitor temperature   - day #3 vancomycin po 500 mg po q 6 hours  - iv flagyl has been added  - GI evaluation in progress  - worsening leukocytosis; consider surgical evaluation  - continue contact isolation  - limit antibiotics exposure  2. other issues: Afib, CHF, CAD s/p stents, COPD, PNA, upper GI bleed (duodenal), PVD, s/p right lower extremity amputation  - per medicine

## 2018-07-09 NOTE — PHYSICAL THERAPY INITIAL EVALUATION ADULT - MANUAL MUSCLE TESTING RESULTS, REHAB EVAL
Bilateral UE strength 3+/5 throughout grossly. Right stump 3+/5, left LE 2+/5 throughout grossly except for DF 3/5.

## 2018-07-09 NOTE — PROGRESS NOTE ADULT - ASSESSMENT
82 y/o wm with hx of ckd stage 3 ( scr 1.5-1.7) with ARYA due to volume depletion from CDiff associated colitis and diarrhea in presence of diuretic use PTA.  Now with non anion gap metabolic acidosis and worsening renal parameters.  CXR with mild CHF with hx of diastolic CHF.    PLAN  - obtain abd and lactate  - will change ivf and add bicarbonate and keep at current rate  - hold lasix done.   - fu uop and scr closely and will monitor respiratory status  - bp and hr stable now, cardizem adjusted and lopressor added    7/9 MK  - ARYA: worsening renal parameters with metabolic acidosis, non ag.  Previous lactate WNL and since placed on bicarbonate drip  fu repeat abg today.  Increase IVF rate  possibility of HD discussed with enio, hcp daughter, pt has been agreeable in past.   DC hydralazine  - Cdiff with rising scr and worsening abd distension: CRS consult ongoing  possible repeat ct scan  JYOTI ballesteros 84 y/o wm with hx of ckd stage 3 ( scr 1.5-1.7) with ARYA due to volume depletion from CDiff associated colitis and diarrhea in presence of diuretic use PTA.  Now with non anion gap metabolic acidosis and worsening renal parameters.  CXR with mild CHF with hx of diastolic CHF.    PLAN  - obtain abd and lactate  - will change ivf and add bicarbonate and keep at current rate  - hold lasix done.   - fu uop and scr closely and will monitor respiratory status  - bp and hr stable now, cardizem adjusted and lopressor added    7/9 MK  - ARYA: worsening renal parameters with metabolic acidosis, non ag.  Previous lactate WNL and since placed on bicarbonate drip  fu repeat abg today.  Increase IVF rate  possibility of HD discussed with enio, hcp daughter, pt has been agreeable in past.   DC hydralazine  dc morphine and change to dilaudid  - Cdiff with rising scr and worsening abd distension: CRS consult ongoing  possible repeat ct scan  JYOTI ballesteros

## 2018-07-09 NOTE — PROGRESS NOTE ADULT - SUBJECTIVE AND OBJECTIVE BOX
NEPHROLOGY INTERVAL HPI/OVERNIGHT EVENTS:  increased abd pain and distension today        MEDICATIONS  (STANDING):  allopurinol 100 milliGRAM(s) Oral daily  buDESOnide   0.5 milliGRAM(s) Respule 0.5 milliGRAM(s) Inhalation two times a day  dextrose 5% 1000 milliLiter(s) (75 mL/Hr) IV Continuous <Continuous>  diltiazem    Tablet 30 milliGRAM(s) Oral every 6 hours  doxazosin 8 milliGRAM(s) Oral at bedtime  ferrous    sulfate 325 milliGRAM(s) Oral daily  finasteride 5 milliGRAM(s) Oral daily  hydrALAZINE 50 milliGRAM(s) Oral two times a day  isosorbide   mononitrate ER Tablet (IMDUR) 120 milliGRAM(s) Oral daily  metoprolol tartrate 25 milliGRAM(s) Oral two times a day  metroNIDAZOLE  IVPB      metroNIDAZOLE  IVPB 500 milliGRAM(s) IV Intermittent every 8 hours  simvastatin 10 milliGRAM(s) Oral at bedtime  vancomycin    Solution 500 milliGRAM(s) Oral every 6 hours    MEDICATIONS  (PRN):  acetaminophen   Tablet 650 milliGRAM(s) Oral every 8 hours PRN For Temp greater than 38 C (100.4 F)  acetaminophen   Tablet. 650 milliGRAM(s) Oral every 8 hours PRN Mild Pain (1 - 3)  ALBUTerol   0.5% 2.5 milliGRAM(s) Nebulizer every 4 hours PRN Shortness of Breath and/or Wheezing  morphine  - Injectable 2 milliGRAM(s) IV Push every 6 hours PRN Severe Pain (7 - 10)  ondansetron Injectable 4 milliGRAM(s) IV Push every 6 hours PRN Nausea and/or Vomiting      Allergies    Zosyn (Rash)    Intolerances        I&O's Detail          Vital Signs Last 24 Hrs  T(C): 36.6 (09 Jul 2018 10:39), Max: 36.7 (08 Jul 2018 11:40)  T(F): 97.8 (09 Jul 2018 10:39), Max: 98.1 (08 Jul 2018 11:40)  HR: 62 (09 Jul 2018 10:39) (62 - 74)  BP: 123/34 (09 Jul 2018 10:39) (109/28 - 137/33)  BP(mean): --  RR: 18 (09 Jul 2018 10:39) (16 - 18)  SpO2: 95% (09 Jul 2018 10:39) (91% - 95%)  Daily     Daily     PHYSICAL EXAM:  General: alert. awake Ox3  HEENT: MMM  CV: s1s2 rrr  LUNGS: B/L CTA  ABD: dec bs, abd distension   EXT: no edema    LABS:                        8.1    20.78 )-----------( 239      ( 09 Jul 2018 05:48 )             25.6     07-09    144  |  114<H>  |  65<H>  ----------------------------<  113<H>  4.5   |  17<L>  |  3.90<H>    Ca    7.1<L>      09 Jul 2018 05:48  Mg     1.9     07-09          Magnesium, Serum: 1.9 mg/dL (07-09 @ 05:48)  Magnesium, Serum: 1.7 mg/dL (07-08 @ 13:53)    ABG - ( 08 Jul 2018 15:47 )  pH, Arterial: 7.18  pH, Blood: x     /  pCO2: 39    /  pO2: 68    / HCO3: 14    / Base Excess: -13.1 /  SaO2: 92

## 2018-07-09 NOTE — PROGRESS NOTE ADULT - ASSESSMENT
Pseudomembranous colitis status post recent antibiotics  Nothing by mouth  By mouth vancomycin high-dose and IV Flagyl   case discussed with hospitalist and colorectal surgery  Concern regarding rising white count, acidemia although the acidosis may be secondary to renal failure.  Additionally, increased abdominal distention and abdominal pain concerning  Colorectal surgery consultation, continue treatment.  I had a discussion with the patient and his daughter regarding surgical interventions and they are considering this at this time, however, daughter understands that this would be very high risk    A fibrillation with rapid ventricular rhythm, status post Cardizem drip.    Would hold anticoagulation secondary to history bleeding and possible surgery.    COPD obesity, obstructive sleep apnea, coronary artery disease, overall comorbid risk factors that would increase his risk of surgery.    IVF per renal

## 2018-07-09 NOTE — PHYSICAL THERAPY INITIAL EVALUATION ADULT - GENERAL OBSERVATIONS, REHAB EVAL
Pt found supine in bed with ICU monitors, IV, and bed alarm activated. Pt with no c/o pain. Pt with right LE AKA. Noted right stump mod edema and left LE discoloration with gauze wrapping/dressing. Pt c/o pain left LE 4/10.

## 2018-07-10 DIAGNOSIS — R00.1 BRADYCARDIA, UNSPECIFIED: ICD-10-CM

## 2018-07-10 DIAGNOSIS — I48.0 PAROXYSMAL ATRIAL FIBRILLATION: ICD-10-CM

## 2018-07-10 DIAGNOSIS — G47.33 OBSTRUCTIVE SLEEP APNEA (ADULT) (PEDIATRIC): ICD-10-CM

## 2018-07-10 DIAGNOSIS — A04.72 ENTEROCOLITIS DUE TO CLOSTRIDIUM DIFFICILE, NOT SPECIFIED AS RECURRENT: ICD-10-CM

## 2018-07-10 LAB
ANION GAP SERPL CALC-SCNC: 12 MMOL/L — SIGNIFICANT CHANGE UP (ref 5–17)
ANION GAP SERPL CALC-SCNC: 12 MMOL/L — SIGNIFICANT CHANGE UP (ref 5–17)
APTT BLD: 31.5 SEC — SIGNIFICANT CHANGE UP (ref 27.5–37.4)
BLD GP AB SCN SERPL QL: SIGNIFICANT CHANGE UP
BUN SERPL-MCNC: 48 MG/DL — HIGH (ref 7–23)
BUN SERPL-MCNC: 68 MG/DL — HIGH (ref 7–23)
CALCIUM SERPL-MCNC: 6.8 MG/DL — LOW (ref 8.5–10.1)
CALCIUM SERPL-MCNC: 6.9 MG/DL — LOW (ref 8.5–10.1)
CHLORIDE SERPL-SCNC: 108 MMOL/L — SIGNIFICANT CHANGE UP (ref 96–108)
CHLORIDE SERPL-SCNC: 111 MMOL/L — HIGH (ref 96–108)
CO2 SERPL-SCNC: 19 MMOL/L — LOW (ref 22–31)
CO2 SERPL-SCNC: 23 MMOL/L — SIGNIFICANT CHANGE UP (ref 22–31)
CREAT SERPL-MCNC: 3.48 MG/DL — HIGH (ref 0.5–1.3)
CREAT SERPL-MCNC: 4.56 MG/DL — HIGH (ref 0.5–1.3)
GLUCOSE SERPL-MCNC: 118 MG/DL — HIGH (ref 70–99)
GLUCOSE SERPL-MCNC: 88 MG/DL — SIGNIFICANT CHANGE UP (ref 70–99)
HCT VFR BLD CALC: 28 % — LOW (ref 39–50)
HGB BLD-MCNC: 8.8 G/DL — LOW (ref 13–17)
INR BLD: 1.08 RATIO — SIGNIFICANT CHANGE UP (ref 0.88–1.16)
MCHC RBC-ENTMCNC: 28.3 PG — SIGNIFICANT CHANGE UP (ref 27–34)
MCHC RBC-ENTMCNC: 31.4 GM/DL — LOW (ref 32–36)
MCV RBC AUTO: 90 FL — SIGNIFICANT CHANGE UP (ref 80–100)
NRBC # BLD: 0 /100 WBCS — SIGNIFICANT CHANGE UP (ref 0–0)
PLATELET # BLD AUTO: 265 K/UL — SIGNIFICANT CHANGE UP (ref 150–400)
POTASSIUM SERPL-MCNC: 4 MMOL/L — SIGNIFICANT CHANGE UP (ref 3.5–5.3)
POTASSIUM SERPL-MCNC: 4.7 MMOL/L — SIGNIFICANT CHANGE UP (ref 3.5–5.3)
POTASSIUM SERPL-SCNC: 4 MMOL/L — SIGNIFICANT CHANGE UP (ref 3.5–5.3)
POTASSIUM SERPL-SCNC: 4.7 MMOL/L — SIGNIFICANT CHANGE UP (ref 3.5–5.3)
PROTHROM AB SERPL-ACNC: 11.7 SEC — SIGNIFICANT CHANGE UP (ref 9.8–12.7)
RBC # BLD: 3.11 M/UL — LOW (ref 4.2–5.8)
RBC # FLD: 20.1 % — HIGH (ref 10.3–14.5)
SODIUM SERPL-SCNC: 142 MMOL/L — SIGNIFICANT CHANGE UP (ref 135–145)
SODIUM SERPL-SCNC: 143 MMOL/L — SIGNIFICANT CHANGE UP (ref 135–145)
TYPE + AB SCN PNL BLD: SIGNIFICANT CHANGE UP
WBC # BLD: 20.36 K/UL — HIGH (ref 3.8–10.5)
WBC # FLD AUTO: 20.36 K/UL — HIGH (ref 3.8–10.5)

## 2018-07-10 PROCEDURE — 93306 TTE W/DOPPLER COMPLETE: CPT | Mod: 26

## 2018-07-10 PROCEDURE — 74018 RADEX ABDOMEN 1 VIEW: CPT | Mod: 26

## 2018-07-10 PROCEDURE — 71045 X-RAY EXAM CHEST 1 VIEW: CPT | Mod: 26,77

## 2018-07-10 PROCEDURE — 71045 X-RAY EXAM CHEST 1 VIEW: CPT | Mod: 26

## 2018-07-10 PROCEDURE — 99233 SBSQ HOSP IP/OBS HIGH 50: CPT

## 2018-07-10 RX ORDER — LIDOCAINE HCL 20 MG/ML
5 VIAL (ML) INJECTION ONCE
Qty: 0 | Refills: 0 | Status: COMPLETED | OUTPATIENT
Start: 2018-07-10 | End: 2018-07-10

## 2018-07-10 RX ORDER — IPRATROPIUM/ALBUTEROL SULFATE 18-103MCG
3 AEROSOL WITH ADAPTER (GRAM) INHALATION ONCE
Qty: 0 | Refills: 0 | Status: COMPLETED | OUTPATIENT
Start: 2018-07-10 | End: 2018-07-10

## 2018-07-10 RX ORDER — HYDROMORPHONE HYDROCHLORIDE 2 MG/ML
0.5 INJECTION INTRAMUSCULAR; INTRAVENOUS; SUBCUTANEOUS ONCE
Qty: 0 | Refills: 0 | Status: DISCONTINUED | OUTPATIENT
Start: 2018-07-10 | End: 2018-07-10

## 2018-07-10 RX ADMIN — Medication 325 MILLIGRAM(S): at 14:10

## 2018-07-10 RX ADMIN — Medication 100 MILLIGRAM(S): at 05:05

## 2018-07-10 RX ADMIN — HYDROMORPHONE HYDROCHLORIDE 0.5 MILLIGRAM(S): 2 INJECTION INTRAMUSCULAR; INTRAVENOUS; SUBCUTANEOUS at 04:45

## 2018-07-10 RX ADMIN — Medication 25 MILLIGRAM(S): at 05:05

## 2018-07-10 RX ADMIN — Medication 500 MILLIGRAM(S): at 14:10

## 2018-07-10 RX ADMIN — Medication 3 MILLILITER(S): at 07:43

## 2018-07-10 RX ADMIN — Medication 100 MILLIGRAM(S): at 15:05

## 2018-07-10 RX ADMIN — Medication 500 MILLIGRAM(S): at 05:05

## 2018-07-10 RX ADMIN — FINASTERIDE 5 MILLIGRAM(S): 5 TABLET, FILM COATED ORAL at 14:10

## 2018-07-10 RX ADMIN — HYDROMORPHONE HYDROCHLORIDE 0.5 MILLIGRAM(S): 2 INJECTION INTRAMUSCULAR; INTRAVENOUS; SUBCUTANEOUS at 00:30

## 2018-07-10 RX ADMIN — HYDROMORPHONE HYDROCHLORIDE 0.5 MILLIGRAM(S): 2 INJECTION INTRAMUSCULAR; INTRAVENOUS; SUBCUTANEOUS at 04:25

## 2018-07-10 RX ADMIN — Medication 100 MILLIGRAM(S): at 22:59

## 2018-07-10 RX ADMIN — ISOSORBIDE MONONITRATE 120 MILLIGRAM(S): 60 TABLET, EXTENDED RELEASE ORAL at 14:11

## 2018-07-10 RX ADMIN — Medication 0.5 MILLIGRAM(S): at 20:36

## 2018-07-10 RX ADMIN — Medication 0.5 MILLIGRAM(S): at 07:42

## 2018-07-10 RX ADMIN — Medication 25 MILLIGRAM(S): at 19:41

## 2018-07-10 RX ADMIN — Medication 100 MILLIGRAM(S): at 14:10

## 2018-07-10 RX ADMIN — Medication 500 MILLIGRAM(S): at 19:40

## 2018-07-10 NOTE — PROGRESS NOTE ADULT - SUBJECTIVE AND OBJECTIVE BOX
NEPHROLOGY INTERVAL HPI/OVERNIGHT EVENTS:  increased abd pain and distension today    7/10  Incontinent pt , documented anuric  Straight cath x 1,  brown urine 200 ml  family at bedside  on ivf + bicarb @ 125 ml/hr        MEDICATIONS  (STANDING):  allopurinol 100 milliGRAM(s) Oral daily  buDESOnide   0.5 milliGRAM(s) Respule 0.5 milliGRAM(s) Inhalation two times a day  dextrose 5% 1000 milliLiter(s) (75 mL/Hr) IV Continuous <Continuous>  diltiazem    Tablet 30 milliGRAM(s) Oral every 6 hours  doxazosin 8 milliGRAM(s) Oral at bedtime  ferrous    sulfate 325 milliGRAM(s) Oral daily  finasteride 5 milliGRAM(s) Oral daily  hydrALAZINE 50 milliGRAM(s) Oral two times a day  isosorbide   mononitrate ER Tablet (IMDUR) 120 milliGRAM(s) Oral daily  metoprolol tartrate 25 milliGRAM(s) Oral two times a day  metroNIDAZOLE  IVPB      metroNIDAZOLE  IVPB 500 milliGRAM(s) IV Intermittent every 8 hours  simvastatin 10 milliGRAM(s) Oral at bedtime  vancomycin    Solution 500 milliGRAM(s) Oral every 6 hours    MEDICATIONS  (PRN):  acetaminophen   Tablet 650 milliGRAM(s) Oral every 8 hours PRN For Temp greater than 38 C (100.4 F)  acetaminophen   Tablet. 650 milliGRAM(s) Oral every 8 hours PRN Mild Pain (1 - 3)  ALBUTerol   0.5% 2.5 milliGRAM(s) Nebulizer every 4 hours PRN Shortness of Breath and/or Wheezing  morphine  - Injectable 2 milliGRAM(s) IV Push every 6 hours PRN Severe Pain (7 - 10)  ondansetron Injectable 4 milliGRAM(s) IV Push every 6 hours PRN Nausea and/or Vomiting      Allergies    Zosyn (Rash)    Intolerances        I&O's Detail          Vital Signs Last 24 Hrs  T(C): 36.6 (10 Jul 2018 11:34), Max: 36.6 (10 Jul 2018 11:34)  T(F): 97.8 (10 Jul 2018 11:34), Max: 97.8 (10 Jul 2018 11:34)  HR: 69 (10 Jul 2018 11:34) (65 - 86)  BP: 124/84 (10 Jul 2018 11:34) (107/33 - 124/84)  BP(mean): --  RR: 17 (10 Jul 2018 11:34) (17 - 17)  SpO2: 91% (10 Jul 2018 11:34) (91% - 93%)      PHYSICAL EXAM:  General: alert. awake Ox3  HEENT: no jvd  CV: s1s2 rrr  LUNGS: B/L CTA  ABD: dec bs, abd distension,+++ abd wall edema  EXT: +++  edema    LABS:                            8.8    20.36 )-----------( 265      ( 10 Jul 2018 05:47 )             28.0     07-10    142  |  111<H>  |  68<H>  ----------------------------<  118<H>  4.7   |  19<L>  |  4.56<H>    Ca    6.9<L>      10 Jul 2018 05:47  Mg     1.9     07-09

## 2018-07-10 NOTE — PROGRESS NOTE ADULT - SUBJECTIVE AND OBJECTIVE BOX
NEPHROLOGY INTERVAL HPI/OVERNIGHT EVENTS:  increased abd pain and distension today    7/10  Incontinent pt , documented anuric  Straight cath x 1,  brown urine 200 ml  family at bedside  on ivf + bicarb @ 125 ml/hr    7/10 addendum  R IJ placed   family at bedside   dialysis to be initiated  pt comfortable  CXR no PTX        MEDICATIONS  (STANDING):  allopurinol 100 milliGRAM(s) Oral daily  buDESOnide   0.5 milliGRAM(s) Respule 0.5 milliGRAM(s) Inhalation two times a day  dextrose 5% 1000 milliLiter(s) (75 mL/Hr) IV Continuous <Continuous>  diltiazem    Tablet 30 milliGRAM(s) Oral every 6 hours  doxazosin 8 milliGRAM(s) Oral at bedtime  ferrous    sulfate 325 milliGRAM(s) Oral daily  finasteride 5 milliGRAM(s) Oral daily  hydrALAZINE 50 milliGRAM(s) Oral two times a day  isosorbide   mononitrate ER Tablet (IMDUR) 120 milliGRAM(s) Oral daily  metoprolol tartrate 25 milliGRAM(s) Oral two times a day  metroNIDAZOLE  IVPB      metroNIDAZOLE  IVPB 500 milliGRAM(s) IV Intermittent every 8 hours  simvastatin 10 milliGRAM(s) Oral at bedtime  vancomycin    Solution 500 milliGRAM(s) Oral every 6 hours    MEDICATIONS  (PRN):  acetaminophen   Tablet 650 milliGRAM(s) Oral every 8 hours PRN For Temp greater than 38 C (100.4 F)  acetaminophen   Tablet. 650 milliGRAM(s) Oral every 8 hours PRN Mild Pain (1 - 3)  ALBUTerol   0.5% 2.5 milliGRAM(s) Nebulizer every 4 hours PRN Shortness of Breath and/or Wheezing  morphine  - Injectable 2 milliGRAM(s) IV Push every 6 hours PRN Severe Pain (7 - 10)  ondansetron Injectable 4 milliGRAM(s) IV Push every 6 hours PRN Nausea and/or Vomiting      Allergies    Zosyn (Rash)    Intolerances        I&O's Detail          Vital Signs Last 24 Hrs  T(C): 36.6 (10 Jul 2018 11:34), Max: 36.6 (10 Jul 2018 11:34)  T(F): 97.8 (10 Jul 2018 11:34), Max: 97.8 (10 Jul 2018 11:34)  HR: 69 (10 Jul 2018 11:34) (65 - 86)  BP: 124/84 (10 Jul 2018 11:34) (107/33 - 124/84)  BP(mean): --  RR: 17 (10 Jul 2018 11:34) (17 - 17)  SpO2: 91% (10 Jul 2018 11:34) (91% - 93%)      PHYSICAL EXAM:  General: alert. awake Ox3  HEENT: no jvd  CV: s1s2 rrr  LUNGS: B/L CTA  ABD: dec bs, abd distension,+++ abd wall edema  EXT: +++  edema    LABS:                            8.8    20.36 )-----------( 265      ( 10 Jul 2018 05:47 )             28.0     07-10    142  |  111<H>  |  68<H>  ----------------------------<  118<H>  4.7   |  19<L>  |  4.56<H>    Ca    6.9<L>      10 Jul 2018 05:47  Mg     1.9     07-09

## 2018-07-10 NOTE — PROGRESS NOTE ADULT - ASSESSMENT
Raúl II- EP consult requested.     Bradycardia- sinus pauses- he will need to be reevaluated as outpt for sleep apnea.  Pt asymptomatic.    NSVT- am labs pending.  Decreased burden since yesterday.     Renal  insufficiency - worsening in the setting of diarrhea.  Consider nephrology consultation.  Close monitoring of the volume status with IVF.     Abdominal pain - Management pre primary team.     Atrial fibrillation- off anticoagulation secondary to severe GI bleed in past.    Other medical issues- Management per primary team.   Thank you for allowing me to participate in the care of this patient. Please feel free to contact me with any questions.

## 2018-07-10 NOTE — PROGRESS NOTE ADULT - ASSESSMENT
84 y/o wm with hx of ckd stage 3 ( scr 1.5-1.7) with ARYA due to volume depletion from CDiff associated colitis and diarrhea in presence of diuretic use PTA.  Now with non anion gap metabolic acidosis and worsening renal parameters.  CXR with mild CHF with hx of diastolic CHF.    PLAN  - obtain abd and lactate  - will change ivf and add bicarbonate and keep at current rate  - hold lasix done.   - fu uop and scr closely and will monitor respiratory status  - bp and hr stable now, cardizem adjusted and lopressor added    7/9 MK  - ARYA: worsening renal parameters with metabolic acidosis, non ag.  Previous lactate WNL and since placed on bicarbonate drip  fu repeat abg today.  Increase IVF rate  possibility of HD discussed with enio, hcp daughter, pt has been agreeable in past.   DC hydralazine  dc morphine and change to dilaudid  - Cdiff with rising scr and worsening abd distension: CRS consult ongoing  possible repeat ct scan  DW Dr ballesteros    7/10  Anuric  anasarca  Non anion gap acidosis on bicarb drip  ARYA, anuric  CKD 3 history  d/w Family, and pt agreeable  called ICU for line placement and to dialyze the pt in ICU for monitoring    7/10  HD initiated via R temp HD catheter  tolerating well  no complaints  good abf

## 2018-07-10 NOTE — CONSULT NOTE ADULT - CONSULT REQUESTED DATE/TIME
07-Jul-2018 07:11
07-Jul-2018 11:50
08-Jul-2018 12:05
08-Jul-2018 12:17
09-Jul-2018 09:04
09-Jul-2018 11:13
10-Jul-2018 08:37

## 2018-07-10 NOTE — PROGRESS NOTE ADULT - ASSESSMENT
82 yo male presented with diarrhea, fever and abdominal pain being treated for suspected C difficile colitis and resultant toxic megacolon.  ARYA now with indications for urgent HD  metabolic acidosis  acute hypoxic respiratory failure due to decompensated diastolic CHF (HFpEF) and fluid overload  hemodynamics reasonable    A fib with RVR - currently rate controlled  COPD  Chronic LEFT LE wound  Chronic diastolic (HFpEF) heart failure  HTN  BPH      Plan at this time is for transfer to ICU  insertion of temp HD catheter  urgent HD  continue PO abx for C difficile colitis  Stop IVF given apparent overload 82 yo male presented with diarrhea, fever and abdominal pain being treated for suspected C difficile colitis.  ARYA now with indications for urgent HD  metabolic acidosis  acute hypoxic respiratory failure due to decompensated diastolic CHF (HFpEF) and fluid overload  hemodynamics reasonable    A fib with RVR - currently rate controlled  COPD  Chronic LEFT LE wound  Chronic diastolic (HFpEF) heart failure  HTN  BPH      Plan at this time is for transfer to ICU  insertion of temp HD catheter  urgent HD  continue PO abx for C difficile colitis  Stop IVF given apparent overload

## 2018-07-10 NOTE — PROGRESS NOTE ADULT - ASSESSMENT
Pseudomembranous colitis status post recent antibiotics  Nothing by mouth, NGT to LIS  By mouth vancomycin high-dose and IV Flagyl   case discussed with hospitalist and MOM left for surgery  Concern regarding rising white count, acidemia although the acidosis may be secondary to renal failure.  Additionally, increased abdominal distention and abdominal pain concerning  Colorectal surgery consultation, continue treatment.  options of high risk surg DW daughter and MOM left for surg  I had a discussion with the patient and his daughter regarding surgical interventions and they are considering this at this time, however, daughter understands that this would be very high risk    A fibrillation with rapid ventricular rhythm, status post Cardizem drip.    Would hold anticoagulation secondary to history bleeding and possible surgery.    COPD obesity, obstructive sleep apnea, coronary artery disease, overall comorbid risk factors that would increase his risk of surgery.    IVF per renal

## 2018-07-10 NOTE — PROCEDURAL SAFETY CHECKLIST WITH OR WITHOUT SEDATION - NSPOSTCOMMENTFT_GEN_ALL_CORE
CVC Insertion Checklist:  Optimal site was chosen for CVC ,Hand hygiene was performed by all caregivers,Chlorhexidine prep was applied to insertion site & allowed to air dry ,Full sterile barrier used to drape patient,Maximal barrier precautions (mask,cap,sterile gown,sterile gloves) worn by  all within 3 feet of sterile field,Sterile field maintained throughout,Biopatch and sterile dressing applied & dated post procedure,All materials including guidewire were accounted for post procedure

## 2018-07-10 NOTE — CONSULT NOTE ADULT - SUBJECTIVE AND OBJECTIVE BOX
CARDIOLOGY AND ELECTROPHYSIOLOGY ASSESSMENT    HPI:  Pt is a 82 y/o M w/pmhx of Afib, CHF, CAD s/p stents, COPD, PNA, upper GI bleed (duodenal)  BIB EMS from [facility] for fever, abd pain, and diarrhea that began 3 weeks ago. Was in the hospital for PNA tx and was later dx with C-diff and started on a course of PO vancomycin for 10 days for the C-diff. States that he has had diarrhea since before the course of abx. Pain has been increased for the past few weeks and is characterized as a cramping feeling. Daughters also note that there is increased distension. Also notes chest pain characterized as a cramping in the central chest. 83% O2 sat noted this morning at the assisted living facility. Takes O2 routinely at night but not during the day. (06 Jul 2018 23:55)    7/1/18:  Pt. seen and examined. Mild abdominal pain.       PAST MEDICAL & SURGICAL HISTORY:  Diastolic CHF  Peripheral vascular disease  Afib  Anemia  CKD (chronic kidney disease)  COPD (chronic obstructive pulmonary disease)  SHAYY (obstructive sleep apnea)  Sepsis, due to unspecified organism: 2/2 poorly healing wounds b/l  Dyspepsia: On moderate exertion.  Sleep apnea, obstructive: Requires home 02 therapy, and treatment with BIPAP  Atelectasis  Pleural effusion, bilateral  Respiratory failure  Peripheral edema  CRI (chronic renal insufficiency)  Gout  Benign prostatic hypertrophy  Spinal stenosis  Hypercholesterolemia  GERD (gastroesophageal reflux disease)  CAD (coronary artery disease)  Hypertension  S/P angioplasty with stent  Cataract of left eye  Prostate: Surgery green light procedure.  S/P rotator cuff surgery: Right  S/P angioplasty      MEDICATIONS  (STANDING):  allopurinol 100 milliGRAM(s) Oral daily  buDESOnide   0.5 milliGRAM(s) Respule 0.5 milliGRAM(s) Inhalation two times a day  dextrose 5% 1000 milliLiter(s) (125 mL/Hr) IV Continuous <Continuous>  diltiazem    Tablet 30 milliGRAM(s) Oral every 6 hours  doxazosin 8 milliGRAM(s) Oral at bedtime  ferrous    sulfate 325 milliGRAM(s) Oral daily  finasteride 5 milliGRAM(s) Oral daily  isosorbide   mononitrate ER Tablet (IMDUR) 120 milliGRAM(s) Oral daily  metoprolol tartrate 25 milliGRAM(s) Oral two times a day  metroNIDAZOLE  IVPB      metroNIDAZOLE  IVPB 500 milliGRAM(s) IV Intermittent every 8 hours  simvastatin 10 milliGRAM(s) Oral at bedtime  vancomycin    Solution 500 milliGRAM(s) Oral every 6 hours    MEDICATIONS  (PRN):  acetaminophen   Tablet 650 milliGRAM(s) Oral every 8 hours PRN For Temp greater than 38 C (100.4 F)  acetaminophen   Tablet. 650 milliGRAM(s) Oral every 8 hours PRN Mild Pain (1 - 3)  ALBUTerol   0.5% 2.5 milliGRAM(s) Nebulizer every 4 hours PRN Shortness of Breath and/or Wheezing  HYDROmorphone  Injectable 0.5 milliGRAM(s) IV Push every 6 hours PRN Moderate Pain (4 - 6)  ondansetron Injectable 4 milliGRAM(s) IV Push every 6 hours PRN Nausea and/or Vomiting      Allergies    Zosyn (Rash)    Intolerances        SOCIAL HISTORY: Denies tobacco, etoh abuse or illicit drug use    FAMILY HISTORY:  No pertinent family history in first degree relatives      Vital Signs Last 24 Hrs  T(C): 36.3 (10 Jul 2018 03:55), Max: 36.6 (09 Jul 2018 10:39)  T(F): 97.4 (10 Jul 2018 03:55), Max: 97.8 (09 Jul 2018 10:39)  HR: 84 (10 Jul 2018 07:42) (62 - 86)  BP: 122/34 (10 Jul 2018 03:55) (107/33 - 123/34)  BP(mean): --  RR: 18 (09 Jul 2018 10:39) (18 - 18)  SpO2: 91% (10 Jul 2018 03:55) (91% - 95%)    REVIEW OF SYSTEMS:    CONSTITUTIONAL:  As per HPI.  HEENT:  Eyes:  No diplopia or blurred vision. ENT:  No earache, sore throat or runny nose.  CARDIOVASCULAR:  No pressure, squeezing, strangling, tightness, heaviness or aching about the chest, neck, axilla or epigastrium.  RESPIRATORY:  No cough, shortness of breath, PND or orthopnea.  GASTROINTESTINAL:  No nausea, vomiting or diarrhea.  GENITOURINARY:  No dysuria, frequency or urgency.  MUSCULOSKELETAL:  As per HPI.  SKIN:  No change in skin, hair or nails.  NEUROLOGIC:  No paresthesias, fasciculations, seizures or weakness.  PSYCHIATRIC:  No disorder of thought or mood.  ENDOCRINE:  No heat or cold intolerance, polyuria or polydipsia.  HEMATOLOGICAL:  No easy bruising or bleedings:  .     PHYSICAL EXAMINATION:    GENERAL APPEARANCE:  Pt. is not currently dyspneic, in no distress. Pt. is alert, oriented, and pleasant.  HEENT:  Pupils are normal and react normally. No icterus. Mucous membranes well colored.  NECK:  Supple. No lymphadenopathy. Jugular venous pressure not elevated. Carotids equal.   HEART:   The cardiac impulse has a normal quality. There are no murmurs, rubs or gallops noted  CHEST:  Chest is clear to auscultation. Normal respiratory effort.  ABDOMEN:  Soft and nontender.   EXTREMITIES:  There is no edema.   SKIN:  No rash or significant lesions are noted.    I&O's Summary      LABS:                        8.8    20.36 )-----------( 265      ( 10 Jul 2018 05:47 )             28.0   07-10    142  |  111<H>  |  68<H>  ----------------------------<  118<H>  4.7   |  19<L>  |  4.56<H>    Ca    6.9<L>      10 Jul 2018 05:47  Mg     1.9     07-09      EKG:    < from: 12 Lead ECG (07.07.18 @ 17:30) >    Ventricular Rate 74 BPM    Atrial Rate 74 BPM    P-R Interval 156 ms    QRS Duration 96 ms    Q-T Interval 364 ms    QTC Calculation(Bezet) 404 ms    P Axis 95 degrees    R Axis 53 degrees    T Axis 16 degrees    Diagnosis Line Normal sinus rhythm  Normal ECG  When compared with ECG of 06-JUL-2018 15:49,  Vent. rate has decreased BY  64 BPM  Confirmed by MARCI FABIAN, ABAD SCHMITT ([id number]) on 7/9/2018 8:46:00 AM    < end of copied text >    TELEMETRY:    NSR with wenckebach and vagally induced intranodal block and sinus bradycardia during sleep      CARDIAC TESTS:    < from: Transthoracic Echocardiogram (03.02.18 @ 11:10) >      < end of copied text >        RADIOLOGY & ADDITIONAL STUDIES:
NEPHROLOGY INTERVAL HPI/OVERNIGHT EVENTS:  82 y/o wm with hx of ckd stage 3 ( scr about 1.6-1.7) presents from Assisted living with fever, abd pain and diarrhea.  Had been dx with cdiff 3 weeks ago and placed on PO vanc at that time.  Since admission, Ct reveals pan colitis + cdiff and on increasing dose of po vanc.  Noted with rising scr despite ivf and renal consult requested.      Pt states that he feels better today.  MOre alert and awake.  diarrhea has decreased and his abd pain improved.  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,        FAMILY HISTORY:  No pertinent family history in first degree relatives      MEDICATIONS  (STANDING):  allopurinol 100 milliGRAM(s) Oral daily  buDESOnide   0.5 milliGRAM(s) Respule 0.5 milliGRAM(s) Inhalation two times a day  diltiazem    Tablet 30 milliGRAM(s) Oral every 6 hours  doxazosin 8 milliGRAM(s) Oral at bedtime  ferrous    sulfate 325 milliGRAM(s) Oral daily  finasteride 5 milliGRAM(s) Oral daily  hydrALAZINE 50 milliGRAM(s) Oral two times a day  isosorbide   mononitrate ER Tablet (IMDUR) 120 milliGRAM(s) Oral daily  metoprolol tartrate 25 milliGRAM(s) Oral two times a day  metroNIDAZOLE  IVPB      metroNIDAZOLE  IVPB 500 milliGRAM(s) IV Intermittent once  metroNIDAZOLE  IVPB 500 milliGRAM(s) IV Intermittent every 8 hours  simvastatin 10 milliGRAM(s) Oral at bedtime  sodium chloride 0.9%. 1000 milliLiter(s) (75 mL/Hr) IV Continuous <Continuous>  vancomycin    Solution 500 milliGRAM(s) Oral every 6 hours    MEDICATIONS  (PRN):  acetaminophen   Tablet 650 milliGRAM(s) Oral every 8 hours PRN For Temp greater than 38 C (100.4 F)  acetaminophen   Tablet. 650 milliGRAM(s) Oral every 8 hours PRN Mild Pain (1 - 3)  ALBUTerol   0.5% 2.5 milliGRAM(s) Nebulizer every 4 hours PRN Shortness of Breath and/or Wheezing  morphine  - Injectable 2 milliGRAM(s) IV Push every 6 hours PRN Severe Pain (7 - 10)  ondansetron Injectable 4 milliGRAM(s) IV Push every 6 hours PRN Nausea and/or Vomiting      Allergies    Zosyn (Rash)    Intolerances        I&O's Summary    07 Jul 2018 07:01  -  08 Jul 2018 07:00  --------------------------------------------------------  IN: 2403 mL / OUT: 2 mL / NET: 2401 mL      Vital Signs Last 24 Hrs  T(C): 36.7 (08 Jul 2018 11:40), Max: 37.1 (07 Jul 2018 17:23)  T(F): 98.1 (08 Jul 2018 11:40), Max: 98.8 (07 Jul 2018 17:23)  HR: 74 (08 Jul 2018 11:40) (57 - 83)  BP: 137/33 (08 Jul 2018 11:40) (112/39 - 148/34)  BP(mean): --  RR: 16 (08 Jul 2018 09:22) (16 - 18)  SpO2: 92% (08 Jul 2018 09:22) (90% - 97%)  Daily     Daily   I&O's Summary    07 Jul 2018 07:01  -  08 Jul 2018 07:00  --------------------------------------------------------  IN: 2403 mL / OUT: 2 mL / NET: 2401 mL        PHYSICAL EXAM:  GEN: alert awake O X 3  HEENT: MMM  NECK supple no jvd  CV: RRR s1s2  LUNGS: b/l CTA  ABD: +BS  soft, mildly distented  EXT: no edema left leg with ace wrap    LABS:                        8.6    17.90 )-----------( 263      ( 08 Jul 2018 05:08 )             27.7     07-08    146<H>  |  117<H>  |  58<H>  ----------------------------<  113<H>  4.6   |  17<L>  |  3.12<H>    Ca    7.3<L>      08 Jul 2018 05:08    TPro  5.1<L>  /  Alb  1.9<L>  /  TBili  0.4  /  DBili  x   /  AST  10<L>  /  ALT  10<L>  /  AlkPhos  62  07-06    PT/INR - ( 06 Jul 2018 19:43 )   PT: 13.4 sec;   INR: 1.24 ratio         PTT - ( 06 Jul 2018 19:43 )  PTT:32.5 sec
Patient is a 83y old  Male who presents with a chief complaint of complain of diarrhea, fever (06 Jul 2018 23:55)      HPI:  Pt is a 82 y/o M w/pmhx of Afib, CHF, CAD s/p stents, COPD, PNA, upper GI bleed (duodenal)  BIB EMS from [facility] living for fever, abd pain, and diarrhea that began 3 weeks ago. Was in the hospital for PNA tx and was later dx with C-diff and started on a course of PO vancomycin for 10 days for the C-diff. States that he has had diarrhea since before the course of abx. Pain has been increased for the past few weeks and is characterized as a cramping feeling. Daughters also note that there is increased distension. Also notes chest pain characterized as a cramping in the central chest. 83% O2 sat noted this morning at the assisted living facility. Takes O2 routinely at night but not during the day. (06 Jul 2018 23:55)    Patient has been having increasing abdominal pain associated with increased diarrhea. Can't quite quantify all of it.    However, he feels that his pain continues.  Negative vomiting.  Diarrhea appears to continue but can't quantify Khoi times a day. Pain is diffuse, crampy, negative radiation and perhaps most prominent in the lower abdomen.        PAST MEDICAL & SURGICAL HISTORY:  Diastolic CHF  Peripheral vascular disease  Afib  Anemia  CKD (chronic kidney disease)  COPD (chronic obstructive pulmonary disease)  SHAYY (obstructive sleep apnea)  Sepsis, due to unspecified organism: 2/2 poorly healing wounds b/l  Dyspepsia: On moderate exertion.  Sleep apnea, obstructive: Requires home 02 therapy, and treatment with BIPAP  Atelectasis  Pleural effusion, bilateral  Respiratory failure  Peripheral edema  CRI (chronic renal insufficiency)  Gout  Benign prostatic hypertrophy  Spinal stenosis  Hypercholesterolemia  GERD (gastroesophageal reflux disease)  CAD (coronary artery disease)  Hypertension  S/P angioplasty with stent  Cataract of left eye  Prostate: Surgery green light procedure.  S/P rotator cuff surgery: Right  S/P angioplasty      MEDICATIONS  (STANDING):  allopurinol 100 milliGRAM(s) Oral daily  buDESOnide   0.5 milliGRAM(s) Respule 0.5 milliGRAM(s) Inhalation two times a day  diltiazem    Tablet 30 milliGRAM(s) Oral every 6 hours  doxazosin 8 milliGRAM(s) Oral at bedtime  ferrous    sulfate 325 milliGRAM(s) Oral daily  finasteride 5 milliGRAM(s) Oral daily  hydrALAZINE 50 milliGRAM(s) Oral two times a day  isosorbide   mononitrate ER Tablet (IMDUR) 120 milliGRAM(s) Oral daily  metoprolol tartrate 25 milliGRAM(s) Oral two times a day  metroNIDAZOLE  IVPB      simvastatin 10 milliGRAM(s) Oral at bedtime  sodium chloride 0.9%. 1000 milliLiter(s) (75 mL/Hr) IV Continuous <Continuous>  vancomycin    Solution 500 milliGRAM(s) Oral every 6 hours    MEDICATIONS  (PRN):  acetaminophen   Tablet 650 milliGRAM(s) Oral every 8 hours PRN For Temp greater than 38 C (100.4 F)  acetaminophen   Tablet. 650 milliGRAM(s) Oral every 8 hours PRN Mild Pain (1 - 3)  ALBUTerol   0.5% 2.5 milliGRAM(s) Nebulizer every 4 hours PRN Shortness of Breath and/or Wheezing  morphine  - Injectable 2 milliGRAM(s) IV Push every 6 hours PRN Severe Pain (7 - 10)  ondansetron Injectable 4 milliGRAM(s) IV Push every 6 hours PRN Nausea and/or Vomiting      Allergies    Zosyn (Rash)    Intolerances        SOCIAL HISTORY:lives at [facility]    FAMILY HISTORY:  No pertinent family history in first degree relatives      REVIEW OF SYSTEMS:    CONSTITUTIONAL: pos weakness,  EYES/ENT: No visual changes;  No vertigo or throat pain   NECK: No pain or stiffness  RESPIRATORY: No cough, wheezing, hemoptysis; No shortness of breath  CARDIOVASCULAR: No chest pain or palpitations  GENITOURINARY: No dysuria, frequency or hematuria  NEUROLOGICAL: No numbness or weakness  SKIN: No itching, burning, rashes, or lesions   All other review of systems is negative unless indicated above.    Vital Signs Last 24 Hrs  T(C): 36.7 (08 Jul 2018 11:40), Max: 37.1 (07 Jul 2018 17:23)  T(F): 98.1 (08 Jul 2018 11:40), Max: 98.8 (07 Jul 2018 17:23)  HR: 74 (08 Jul 2018 11:40) (57 - 83)  BP: 137/33 (08 Jul 2018 11:40) (112/39 - 148/34)  BP(mean): --  RR: 16 (08 Jul 2018 09:22) (16 - 18)  SpO2: 92% (08 Jul 2018 09:22) (90% - 97%)    PHYSICAL EXAM:    Constitutional: NAD, well-developed  HEENT: EOMI, throat clear  Neck: No LAD, supple  Respiratory: CTA and P  Cardiovascular: S1 and S2, RRR, no M  Gastrointestinal: BS+, soft, mild diffuse tenderness/ND, neg HSM,  Extremities: edema and Sp toe amputation  Vascular: 2+ peripheral pulses  Neurological: A/O x 3, no focal deficits  Psychiatric: Normal mood, normal affect  Skin: No rashes    LABS:  CBC Full  -  ( 08 Jul 2018 05:08 )  WBC Count : 17.90 K/uL  Hemoglobin : 8.6 g/dL  Hematocrit : 27.7 %  Platelet Count - Automated : 263 K/uL  Mean Cell Volume : 91.1 fl  Mean Cell Hemoglobin : 28.3 pg  Mean Cell Hemoglobin Concentration : 31.0 gm/dL  Auto Neutrophil # : x  Auto Lymphocyte # : x  Auto Monocyte # : x  Auto Eosinophil # : x  Auto Basophil # : x  Auto Neutrophil % : x  Auto Lymphocyte % : x  Auto Monocyte % : x  Auto Eosinophil % : x  Auto Basophil % : x    07-08    146<H>  |  117<H>  |  58<H>  ----------------------------<  113<H>  4.6   |  17<L>  |  3.12<H>    Ca    7.3<L>      08 Jul 2018 05:08    TPro  5.1<L>  /  Alb  1.9<L>  /  TBili  0.4  /  DBili  x   /  AST  10<L>  /  ALT  10<L>  /  AlkPhos  62  07-06    PT/INR - ( 06 Jul 2018 19:43 )   PT: 13.4 sec;   INR: 1.24 ratio         PTT - ( 06 Jul 2018 19:43 )  PTT:32.5 sec        RADIOLOGY & ADDITIONAL STUDIES:
Patient is a 83y old  Male who presents with a chief complaint of complain of diarrhea, fever (06 Jul 2018 23:55)      HPI:  Pt is a 82 y/o M w/pmhx of Afib, CHF, CAD s/p stents, COPD, PNA, upper GI bleed (duodenal)  BIB EMS from [facility] assisted living for fever, abd pain, and diarrhea that began 3 weeks ago. Was in the hospital for PNA tx and was later dx with C-diff and started on a course of PO vancomycin for 10 days for the C-diff. States that he has had diarrhea since before the course of abx. Pain has been increased for the past few weeks and is characterized as a cramping feeling. Daughters also note that there is increased distension. Also notes chest pain characterized as a cramping in the central chest. 83% O2 sat noted this morning at the assisted living facility. Takes O2 routinely at night but not during the day. (06 Jul 2018 23:55)  Patient's daughter states he was treated for ESBL with antibiotics prior to this      PAST MEDICAL & SURGICAL HISTORY:  Diastolic CHF  Peripheral vascular disease  Afib  Anemia  CKD (chronic kidney disease)  COPD (chronic obstructive pulmonary disease)  SHAYY (obstructive sleep apnea)  Sepsis, due to unspecified organism: 2/2 poorly healing wounds b/l  Dyspepsia: On moderate exertion.  Sleep apnea, obstructive: Requires home 02 therapy, and treatment with BIPAP  Atelectasis  Pleural effusion, bilateral  Respiratory failure  Peripheral edema  CRI (chronic renal insufficiency)  Gout  Benign prostatic hypertrophy  Spinal stenosis  Hypercholesterolemia  GERD (gastroesophageal reflux disease)  CAD (coronary artery disease)  Hypertension  S/P angioplasty with stent  Cataract of left eye  Prostate: Surgery green light procedure.  S/P rotator cuff surgery: Right  S/P angioplasty      PREVIOUS DIAGNOSTIC TESTING:      MEDICATIONS  (STANDING):  allopurinol 100 milliGRAM(s) Oral daily  buDESOnide   0.5 milliGRAM(s) Respule 0.5 milliGRAM(s) Inhalation two times a day  dextrose 5% 1000 milliLiter(s) (75 mL/Hr) IV Continuous <Continuous>  diltiazem    Tablet 30 milliGRAM(s) Oral every 6 hours  doxazosin 8 milliGRAM(s) Oral at bedtime  ferrous    sulfate 325 milliGRAM(s) Oral daily  finasteride 5 milliGRAM(s) Oral daily  hydrALAZINE 50 milliGRAM(s) Oral two times a day  isosorbide   mononitrate ER Tablet (IMDUR) 120 milliGRAM(s) Oral daily  metoprolol tartrate 25 milliGRAM(s) Oral two times a day  metroNIDAZOLE  IVPB      metroNIDAZOLE  IVPB 500 milliGRAM(s) IV Intermittent every 8 hours  simvastatin 10 milliGRAM(s) Oral at bedtime  vancomycin    Solution 500 milliGRAM(s) Oral every 6 hours    MEDICATIONS  (PRN):  acetaminophen   Tablet 650 milliGRAM(s) Oral every 8 hours PRN For Temp greater than 38 C (100.4 F)  acetaminophen   Tablet. 650 milliGRAM(s) Oral every 8 hours PRN Mild Pain (1 - 3)  ALBUTerol   0.5% 2.5 milliGRAM(s) Nebulizer every 4 hours PRN Shortness of Breath and/or Wheezing  morphine  - Injectable 2 milliGRAM(s) IV Push every 6 hours PRN Severe Pain (7 - 10)  ondansetron Injectable 4 milliGRAM(s) IV Push every 6 hours PRN Nausea and/or Vomiting      FAMILY HISTORY:  No pertinent family history in first degree relatives    REVIEW OF SYSTEM:  abdominal pain, otherwise 12 point ROS negative     Vital Signs Last 24 Hrs  T(C): 36.4 (09 Jul 2018 04:00), Max: 36.7 (08 Jul 2018 11:40)  T(F): 97.6 (09 Jul 2018 04:00), Max: 98.1 (08 Jul 2018 11:40)  HR: 66 (09 Jul 2018 07:40) (66 - 80)  BP: 115/23 (09 Jul 2018 04:00) (109/28 - 137/33)  BP(mean): --  RR: 16 (09 Jul 2018 04:00) (16 - 18)  SpO2: 93% (09 Jul 2018 04:00) (91% - 95%)    I&O's Summary    PHYSICAL EXAM  General Appearance: cooperative, no acute distress,   HEENT: PERRL, conjunctiva clear, EOM's intact, non injected pharynx, no exudate, TM   normal  Neck: Supple, , no adenopathy, thyroid: not enlarged, no carotid bruit or JVD  Back: Symmetric, no  tenderness,no soft tissue tenderness  Lungs: Diminished bilaterally   Heart: Regular rate and rhythm, S1, S2 normal, no murmur, rub or gallop  Abdomen: Soft, non-tender, bowel sounds active , no hepatosplenomegaly  Extremities: no cyanosis or edema, no joint swelling  Skin: Skin color, texture normal, no rashes   Neurologic: Alert and oriented X3 , cranial nerves intact, sensory and motor normal,    ECG:    LABS:                          8.1    20.78 )-----------( 239      ( 09 Jul 2018 05:48 )             25.6     07-09    144  |  114<H>  |  65<H>  ----------------------------<  113<H>  4.5   |  17<L>  |  3.90<H>    Ca    7.1<L>      09 Jul 2018 05:48  Mg     1.9     07-09      CARDIAC MARKERS ( 07 Jul 2018 17:51 )  0.024 ng/mL / x     / x     / x     / x                  ABG - ( 08 Jul 2018 15:47 )  pH, Arterial: 7.18  pH, Blood: x     /  pCO2: 39    /  pO2: 68    / HCO3: 14    / Base Excess: -13.1 /  SaO2: 92                    RADIOLOGY & ADDITIONAL STUDIES:  IMPRESSION:     Severe pancolitis consistent with pseudomembranous colitis.    Mild pulmonary edema.    Small loculated right and trace layering left pleural effusions.
Patient is a 83y old  Male who presents with a chief complaint of complain of diarrhea, fever (06 Jul 2018 23:55)      HPI:  Pt is a 82 y/o M w/pmhx of Afib,not on AC bc of recurrent GIB  CHF, CAD s/p stents, COPD, PNA, upper GI bleed (duodenal)  BIB EMS from [facility] for fever, abd pain, and diarrhea that began 3 weeks ago. Was in the hospital for PNA tx and was later dx with C-diff and started on a course of PO vancomycin for 10 days for the C-diff. States that he has had diarrhea since before the course of abx. Pain has been increased for the past few weeks and is characterized as a cramping feeling. Daughters also note that there is increased distension. Also notes chest pain characterized as a cramping in the central chest. 83% O2 sat noted this morning at the assisted living facility. Takes O2 routinely at night but not during the day. (06 Jul 2018 23:55) found to have pseudomembranous colitis   He denies CP or SOB , there was a mention of AFIB with RVR but I only see sinus tach on admission EKG     PAST MEDICAL & SURGICAL HISTORY:  Diastolic CHF  Peripheral vascular disease  Afib  Anemia  CKD (chronic kidney disease)  COPD (chronic obstructive pulmonary disease)  SHAYY (obstructive sleep apnea)  Sepsis, due to unspecified organism: 2/2 poorly healing wounds b/l  Dyspepsia: On moderate exertion.  Sleep apnea, obstructive: Requires home 02 therapy, and treatment with BIPAP  Atelectasis  Pleural effusion, bilateral  Respiratory failure  Peripheral edema  CRI (chronic renal insufficiency)  Gout  Benign prostatic hypertrophy  Spinal stenosis  Hypercholesterolemia  GERD (gastroesophageal reflux disease)  CAD (coronary artery disease)  Hypertension  S/P angioplasty with stent  Cataract of left eye  Prostate: Surgery green light procedure.  S/P rotator cuff surgery: Right  S/P angioplasty                                    MEDICATIONS  (STANDING):  allopurinol 100 milliGRAM(s) Oral daily  buDESOnide   0.5 milliGRAM(s) Respule 0.5 milliGRAM(s) Inhalation two times a day  ciprofloxacin   IVPB 400 milliGRAM(s) IV Intermittent every 12 hours  diltiazem    Tablet 30 milliGRAM(s) Oral every 6 hours  diltiazem Infusion 5 mG/Hr (5 mL/Hr) IV Continuous <Continuous>  doxazosin 8 milliGRAM(s) Oral at bedtime  ferrous    sulfate 325 milliGRAM(s) Oral daily  finasteride 5 milliGRAM(s) Oral daily  hydrALAZINE 50 milliGRAM(s) Oral two times a day  isosorbide   mononitrate ER Tablet (IMDUR) 120 milliGRAM(s) Oral daily  pantoprazole    Tablet 40 milliGRAM(s) Oral before breakfast  simvastatin 10 milliGRAM(s) Oral at bedtime  sodium chloride 0.9%. 1000 milliLiter(s) (75 mL/Hr) IV Continuous <Continuous>  vancomycin    Solution 125 milliGRAM(s) Oral every 6 hours    MEDICATIONS  (PRN):  acetaminophen   Tablet 650 milliGRAM(s) Oral every 8 hours PRN For Temp greater than 38 C (100.4 F)  acetaminophen   Tablet. 650 milliGRAM(s) Oral every 8 hours PRN Mild Pain (1 - 3)  ALBUTerol   0.5% 2.5 milliGRAM(s) Nebulizer every 4 hours PRN Shortness of Breath and/or Wheezing      FAMILY HISTORY:  No pertinent family history in first degree relatives      SOCIAL HISTORY:    CIGARETTES:        Vital Signs Last 24 Hrs  T(C): 37.9 (07 Jul 2018 03:52), Max: 38.3 (06 Jul 2018 13:17)  T(F): 100.3 (07 Jul 2018 03:52), Max: 101 (06 Jul 2018 13:17)  HR: 91 (07 Jul 2018 03:52) (91 - 140)  BP: 151/36 (07 Jul 2018 03:52) (127/82 - 157/54)  BP(mean): --  RR: 17 (07 Jul 2018 03:52) (17 - 18)  SpO2: 93% (07 Jul 2018 03:52) (93% - 97%)            INTERPRETATION OF TELEMETRY: SR /ST     ECG:    I&O's Detail      LABS:                        8.5    13.59 )-----------( 214      ( 06 Jul 2018 13:35 )             26.9     07-06    148<H>  |  117<H>  |  39<H>  ----------------------------<  105<H>  3.4<L>   |  19<L>  |  1.75<H>    Ca    7.6<L>      06 Jul 2018 13:35    TPro  5.1<L>  /  Alb  1.9<L>  /  TBili  0.4  /  DBili  x   /  AST  10<L>  /  ALT  10<L>  /  [facility]  62  07-06    CARDIAC MARKERS ( 07 Jul 2018 02:32 )  0.041 ng/mL / x     / x     / x     / x      CARDIAC MARKERS ( 06 Jul 2018 23:20 )  0.042 ng/mL / x     / x     / x     / x          PT/INR - ( 06 Jul 2018 19:43 )   PT: 13.4 sec;   INR: 1.24 ratio         PTT - ( 06 Jul 2018 19:43 )  PTT:32.5 sec    I&O's Summary    BNP  RADIOLOGY & ADDITIONAL STUDIES:
Patient is a 83y old  Male who presents with a chief complaint of complain of diarrhea, fever (2018 23:55)    HPI:  Pt is a 82 y/o M w/pmhx of Afib, CHF, CAD s/p stents, COPD, PNA, upper GI bleed (duodenal), PVD, s/p right lower extremity amputation,  recent hospitalization for pneumonia and subsequent Cdiff colitis admitted on  from assisted living for evaluation of persistent abdominal pain, fever and diarrhea that has been ongoing despite the po vancomycin course that the patient had been on. The patient also notes mid epigastric/chest pain. History per daughter at bedside and medical record as patient is poor historian.        PMH: as above  PSH: as above  Meds: per reconciliation sheet, noted below  MEDICATIONS  (STANDING):  allopurinol 100 milliGRAM(s) Oral daily  buDESOnide   0.5 milliGRAM(s) Respule 0.5 milliGRAM(s) Inhalation two times a day  diltiazem    Tablet 30 milliGRAM(s) Oral every 6 hours  diltiazem Infusion 5 mG/Hr (5 mL/Hr) IV Continuous <Continuous>  doxazosin 8 milliGRAM(s) Oral at bedtime  ferrous    sulfate 325 milliGRAM(s) Oral daily  finasteride 5 milliGRAM(s) Oral daily  hydrALAZINE 50 milliGRAM(s) Oral two times a day  isosorbide   mononitrate ER Tablet (IMDUR) 120 milliGRAM(s) Oral daily  pantoprazole    Tablet 40 milliGRAM(s) Oral before breakfast  simvastatin 10 milliGRAM(s) Oral at bedtime  sodium chloride 0.9%. 1000 milliLiter(s) (75 mL/Hr) IV Continuous <Continuous>  vancomycin    Solution 500 milliGRAM(s) Oral every 6 hours    MEDICATIONS  (PRN):  acetaminophen   Tablet 650 milliGRAM(s) Oral every 8 hours PRN For Temp greater than 38 C (100.4 F)  acetaminophen   Tablet. 650 milliGRAM(s) Oral every 8 hours PRN Mild Pain (1 - 3)  ALBUTerol   0.5% 2.5 milliGRAM(s) Nebulizer every 4 hours PRN Shortness of Breath and/or Wheezing    Allergies    Zosyn (Rash)    Intolerances      Social: no smoking, no alcohol, no illegal drugs; no recent travel, no exposure to TB  FAMILY HISTORY:  No pertinent family history in first degree relatives     no history of premature cardiovascular disease in first degree relatives  ROS: unable to obtain secondary to patient medical condition     Vital Signs Last 24 Hrs  T(C): 37 (2018 11:23), Max: 38.3 (2018 13:17)  T(F): 98.6 (2018 11:23), Max: 101 (2018 13:17)  HR: 76 (2018 11:23) (76 - 140)  BP: 122/30 (2018 11:23) (122/30 - 157/54)  BP(mean): --  RR: 18 (2018 11:23) (17 - 18)  SpO2: 100% (2018 11:23) (93% - 100%)  Daily Height in cm: 170.18 (2018 13:17)    Daily Weight in k.2 (2018 03:52)    PE:    Constitutional: frail looking  HEENT: NC/AT, EOMI, PERRLA, conjunctivae clear; ears and nose atraumatic; pharynx clear  Neck: supple; thyroid not palpable  Respiratory: respiratory effort normal; clear to auscultation  Cardiovascular: S1S2 regular, no murmurs  Abdomen: soft, not tender, mildly distended, positive BS; no liver or spleen organomegaly  Genitourinary: no suprapubic tenderness  Musculoskeletal: s/p right lower extremity amputation; left lower extremity with dressing in place  Neurological/ Psychiatric: AxOx3, judgement and insight normal;  moving all extremities  Skin: no rashes; no palpable lesions    Labs: all available labs reviewed                        8.3    13.30 )-----------( 238      ( 2018 06:55 )             26.8     -    146<H>  |  115<H>  |  44<H>  ----------------------------<  102<H>  3.6   |  17<L>  |  2.26<H>    Ca    7.3<L>      2018 06:55    TPro  5.1<L>  /  Alb  1.9<L>  /  TBili  0.4  /  DBili  x   /  AST  10<L>  /  ALT  10<L>  /  AlkPhos  62       LIVER FUNCTIONS - ( 2018 13:35 )  Alb: 1.9 g/dL / Pro: 5.1 gm/dL / ALK PHOS: 62 U/L / ALT: 10 U/L / AST: 10 U/L / GGT: x           Clostridium difficile Toxin by PCR (18 @ 16:19)    C Diff by PCR Result: Detected    Clostridium difficile Toxin by PCR: The results of this test should be interpreted with consideration of all  clinical and laboratory findings. This test determines the presence of  the C. difficile tcdB gene at a given time and is not intended to  identify antibiotic associated disease or C. difficile infection without  clinical context. Successful treatment is based on the resolution of  clinical symptoms. This test should not be used as a "test of cure"  because C. difficile DNA will persist after successful treatment. Repeat  testing will not be permitted.    This test is performed on the BD MAX system using Real-Time PCR and  fluorogenic target-specific hybridization.        < from: CT Abdomen and Pelvis w/ Oral Cont (18 @ 18:03) >    IMPRESSION:     Severe pancolitis consistent with pseudomembranous colitis.    Mild pulmonary edema.    Small loculated right and trace layering left pleural effusions.      < end of copied text >          Radiology: all available radiological tests reviewed    Advanced directives addressed: full resuscitation
Pt is a 84 y/o M w/pmhx of Afib, CHF, CAD s/p stents, COPD, PNA, upper GI bleed (duodenal)  BIB EMS from [facility] for fever, abd pain, and diarrhea that began 3 weeks ago. Was in the hospital for PNA tx and was later dx with C-diff and started on a course of PO vancomycin for 10 days for the C-diff. States that he has had diarrhea since before the course of abx. Pain has been increased for the past few weeks and is characterized as a cramping feeling. Daughters also note that there is increased distension. Also notes chest pain characterized as a cramping in the central chest. 83% O2 sat noted this morning at the assisted living facility. Takes O2 routinely at night but not during the day. (06 Jul 2018 23:55)    Diarrhea improved and abdominal pain reported to be mostly lower abdomen. Remains afebrile and HD stable but with rising WBC.    PAST MEDICAL & SURGICAL HISTORY:  Diastolic CHF  Peripheral vascular disease  Afib  Anemia  CKD (chronic kidney disease)  COPD (chronic obstructive pulmonary disease)  SHAYY (obstructive sleep apnea)  Sepsis, due to unspecified organism: 2/2 poorly healing wounds b/l  Dyspepsia: On moderate exertion.  Sleep apnea, obstructive: Requires home 02 therapy, and treatment with BIPAP  Atelectasis  Pleural effusion, bilateral  Respiratory failure  Peripheral edema  CRI (chronic renal insufficiency)  Gout  Benign prostatic hypertrophy  Spinal stenosis  Hypercholesterolemia  GERD (gastroesophageal reflux disease)  CAD (coronary artery disease)  Hypertension  S/P angioplasty with stent  Cataract of left eye  Prostate: Surgery green light procedure.  S/P rotator cuff surgery: Right  S/P angioplasty    FAMILY HISTORY:  No pertinent family history in first degree relatives    Allergies    Zosyn (Rash)    Intolerances      MEDICATIONS  (STANDING):  allopurinol 100 milliGRAM(s) Oral daily  buDESOnide   0.5 milliGRAM(s) Respule 0.5 milliGRAM(s) Inhalation two times a day  dextrose 5% 1000 milliLiter(s) (75 mL/Hr) IV Continuous <Continuous>  diltiazem    Tablet 30 milliGRAM(s) Oral every 6 hours  doxazosin 8 milliGRAM(s) Oral at bedtime  ferrous    sulfate 325 milliGRAM(s) Oral daily  finasteride 5 milliGRAM(s) Oral daily  hydrALAZINE 50 milliGRAM(s) Oral two times a day  isosorbide   mononitrate ER Tablet (IMDUR) 120 milliGRAM(s) Oral daily  metoprolol tartrate 25 milliGRAM(s) Oral two times a day  metroNIDAZOLE  IVPB      metroNIDAZOLE  IVPB 500 milliGRAM(s) IV Intermittent every 8 hours  simvastatin 10 milliGRAM(s) Oral at bedtime  vancomycin    Solution 500 milliGRAM(s) Oral every 6 hours    MEDICATIONS  (PRN):  acetaminophen   Tablet 650 milliGRAM(s) Oral every 8 hours PRN For Temp greater than 38 C (100.4 F)  acetaminophen   Tablet. 650 milliGRAM(s) Oral every 8 hours PRN Mild Pain (1 - 3)  ALBUTerol   0.5% 2.5 milliGRAM(s) Nebulizer every 4 hours PRN Shortness of Breath and/or Wheezing  morphine  - Injectable 2 milliGRAM(s) IV Push every 6 hours PRN Severe Pain (7 - 10)  ondansetron Injectable 4 milliGRAM(s) IV Push every 6 hours PRN Nausea and/or Vomiting    Vital Signs Last 24 Hrs  T(C): 36.6 (09 Jul 2018 10:39), Max: 36.7 (08 Jul 2018 11:40)  T(F): 97.8 (09 Jul 2018 10:39), Max: 98.1 (08 Jul 2018 11:40)  HR: 62 (09 Jul 2018 10:39) (62 - 74)  BP: 123/34 (09 Jul 2018 10:39) (109/28 - 137/33)  BP(mean): --  RR: 18 (09 Jul 2018 10:39) (16 - 18)  SpO2: 95% (09 Jul 2018 10:39) (91% - 95%)  I&O's Detail    Arousable  ABD exam: soft, tender B/L LQ, distended                           8.1    20.78 )-----------( 239      ( 09 Jul 2018 05:48 )             25.6     07-09    144  |  114<H>  |  65<H>  ----------------------------<  113<H>  4.5   |  17<L>  |  3.90<H>    Ca    7.1<L>      09 Jul 2018 05:48  Mg     1.9     07-09

## 2018-07-10 NOTE — PROGRESS NOTE ADULT - SUBJECTIVE AND OBJECTIVE BOX
Pt is a 84 y/o M w/pmhx of Afib, CHF, CAD s/p stents, COPD, PNA, upper GI bleed (duodenal)  BIB EMS from [facility] assisted [facility] for fever, abd pain, and diarrhea that began 3 weeks ago. Was in the hospital for PNA tx and was later dx with C-diff and started on a course of PO vancomycin for 10 days for the C-diff. States that he has had diarrhea since before the course of abx. Pain has been increased for the past few weeks and is characterized as a cramping feeling. Daughters also note that there is increased distension. Also notes chest pain characterized as a cramping in the central chest. 83% O2 sat noted this morning at the assisted living facility. Takes O2 routinely at night but not during the day.      07/07/18: Patient seen and examined. Still with abd pain.   07/08/18: patient seen and examined. Diarrhea improving, less abd pain as per patient. Discussed with patient regarding management plan. Discussed with Dr Wu.   07/09/18: Patient seen and examined. Sleepy after IV dilaudid. Discussed with 2 daughters at bed side regarding management plan. Discussed with Dr Hernández, Dr Schultz and Dr wu. Discussed with radiologist regarding  repeat CT abd/pel: No worsening colitis. Patient now with mild to mod ascites.   07/10/18; Patient seen and examined in am. No abd pain, more awake and alert. Poor urine out put and  worsening renal function. Discussed with colorectal Dr Rashid, no need for emergent surgery, no toxic megacolon and also family was reluctant for surgery at this time. Discussed with Dr sanabria, needs HD to remove fluid. He was transferred to ICU for HD. Discussed with daughter and son at bed side regarding management plan.       Vital Signs Last 24 Hrs  T(C): 36.2 (10 Jul 2018 16:18), Max: 36.6 (10 Jul 2018 11:34)  T(F): 97.2 (10 Jul 2018 16:18), Max: 97.8 (10 Jul 2018 11:34)  HR: 62 (10 Jul 2018 17:30) (62 - 86)  BP: 130/40 (10 Jul 2018 17:30) (107/33 - 133/37)  BP(mean): 62 (10 Jul 2018 16:18) (58 - 67)  RR: 17 (10 Jul 2018 17:30) (14 - 20)  SpO2: 96% (10 Jul 2018 17:30) (91% - 96%)        PHYSICAL EXAM:   · CONSTITUTIONAL: Elderly white male, awake, alert, oriented to person, place, time/situation and in no apparent respiratory distress. + ill appearing	  · ENMT: Airway patent, Nasal mucosa clear. Mouth with mildly dry MM.	  · EYES: Clear bilaterally, pupils equal, round and reactive to light. EOMI	  · CARDIAC: Tachycardic, regular rhythm.	  · RESPIRATORY: Decreased breathe sounds at the right base, no crackles, wheezing or retractions.	  · GASTROINTESTINAL: Hypoactive bowel sounds with normal pitch. Diffuse TTP especially on the right side. + distension. No hyper tympany on percussion.	  · MUSCULOSKELETAL: right BKA. LLE SLR 10 degrees. MAEx4. No edema	  · SKIN: +tactile warmth, on the lateral aspect of the LLE has a sore with purulent discharge.	            Labs:                                            8.8    20.36 )-----------( 265      ( 10 Jul 2018 05:47 )             28.0     10 Jul 2018 05:47    142    |  111    |  68     ----------------------------<  118    4.7     |  19     |  4.56     Ca    6.9        10 Jul 2018 05:47  Mg     1.9       09 Jul 2018 05:48        PT/INR - ( 10 Jul 2018 11:56 )   PT: 11.7 sec;   INR: 1.08 ratio         PTT - ( 10 Jul 2018 11:56 )  PTT:31.5 sec  CAPILLARY BLOOD GLUCOSE          MEDICATIONS:      MEDICATIONS  (STANDING):  allopurinol 100 milliGRAM(s) Oral daily  buDESOnide   0.5 milliGRAM(s) Respule 0.5 milliGRAM(s) Inhalation two times a day  diltiazem    Tablet 30 milliGRAM(s) Oral every 6 hours  doxazosin 8 milliGRAM(s) Oral at bedtime  ferrous    sulfate 325 milliGRAM(s) Oral daily  finasteride 5 milliGRAM(s) Oral daily  isosorbide   mononitrate ER Tablet (IMDUR) 120 milliGRAM(s) Oral daily  lidocaine 2% Jelly 5 milliLiter(s) IntraUrethral once  metoprolol tartrate 25 milliGRAM(s) Oral two times a day  metroNIDAZOLE  IVPB      metroNIDAZOLE  IVPB 500 milliGRAM(s) IV Intermittent every 8 hours  simvastatin 10 milliGRAM(s) Oral at bedtime  vancomycin    Solution 500 milliGRAM(s) Oral every 6 hours    MEDICATIONS  (PRN):  acetaminophen   Tablet 650 milliGRAM(s) Oral every 8 hours PRN For Temp greater than 38 C (100.4 F)  acetaminophen   Tablet. 650 milliGRAM(s) Oral every 8 hours PRN Mild Pain (1 - 3)  ALBUTerol   0.5% 2.5 milliGRAM(s) Nebulizer every 4 hours PRN Shortness of Breath and/or Wheezing  HYDROmorphone  Injectable 0.5 milliGRAM(s) IV Push every 6 hours PRN Moderate Pain (4 - 6)  ondansetron Injectable 4 milliGRAM(s) IV Push every 6 hours PRN Nausea and/or Vomiting            Assessment and Plan:   Assessment:  · Assessment		  84 yo male presented with diarrhea, fever and abdominal pain.    A/P:    1.  Pseudomembranous colitis, H/O recent antibiotic use for pneumonia  Clinically improving. Not much abd pain  leukocytosis  No toxic megacolon  -keep NPO except meds   -Increased PO Vancomycin, started on IV flagyl   -Dr Schultz and Dr Hernández follow up appreciated.  -Dr Rashid follow up appreciated.  -Follow clinically.  -Follow CBC.    2.     Acute kidney injury on CKD stage 3  Metabolic acidosis  Volume overload.  Transferred to ICU for HD  Dr Sanabria follow up appreciated.  Follow BMP  D/C IV fluid      3.  A fib with RVR  -S/P Cardizem drip  -on PO Cardizem now   -due to very significant GI bleeding history in recent months no AC  -he has multiple vessel clipping for GI bleed   -cardiology follow up appreciated    4.  COPD  -Dr Arnold consult appreciated  -Albuterol as needed.  -O2 support      5.  Chr diastolic CHF-  Hold diuretics due to diarrhea and C diff colitis, ARYA  Started HD today to remove fluid.     6.   HTN-stable  On lopressor    7.   BPH-continue  flomax.     Prognosis-guarded  Discussed with family members at bed side.

## 2018-07-10 NOTE — CONSULT NOTE ADULT - CONSULT REASON
AFIB
ARYA with ckd stage 3 baseline
C diff colitis
Restrictive Ventilatory Disease
cdiff colitis
diarrhea
"Bernice SINGH"

## 2018-07-10 NOTE — PROGRESS NOTE ADULT - SUBJECTIVE AND OBJECTIVE BOX
No acute events overnight. Had some nausea yesterday. Denies pain at rest. Reports improved diarrhea    Exam:  Vital Signs Last 24 Hrs  T(C): 36.3 (10 Jul 2018 03:55), Max: 36.6 (09 Jul 2018 10:39)  T(F): 97.4 (10 Jul 2018 03:55), Max: 97.8 (09 Jul 2018 10:39)  HR: 84 (10 Jul 2018 07:42) (62 - 86)  BP: 122/34 (10 Jul 2018 03:55) (107/33 - 123/34)  RR: 18 (09 Jul 2018 10:39) (18 - 18)  SpO2: 91% (10 Jul 2018 03:55) (91% - 95%)    In no distress  Abdomen distended with diffuse tenderness although no peritonitis                          8.8    20.36 )-----------( 265      ( 10 Jul 2018 05:47 )             28.0   07-10    142  |  111<H>  |  68<H>  ----------------------------<  118<H>  4.7   |  19<L>  |  4.56<H>    Ca    6.9<L>      10 Jul 2018 05:47  Mg     1.9     07-09    < from: CT Abdomen and Pelvis No Cont (07.09.18 @ 13:07) >  EXAM:  CT ABDOMEN AND PELVIS                            PROCEDURE DATE:  07/09/2018          INTERPRETATION:  CLINICAL STATEMENT: evaluate for pneumatosis    TECHNIQUE: CT of the abdomen and pelvis was performed in the axial plane   utilizing thin slices without IV or oral contrast. Sagittal and coronal   reformatting was performed.    COMPARISON: 7/6/2018    FINDINGS:    The lower chest demonstrates small bilateral pleural effusions right   greater than left. There is passive atelectasis at the lung bases. There   is coronary artery calcification.    The evaluation of the abdominal viscera and the bowel is limited without   contrast.    The liver again demonstrates small hypodense focus in the right lobe   peripherally unchanged. There is also punctate calcification in the right   hepatic lobe again noted. The gallbladder is again moderately distended   and demonstrates calcified stone.    The spleen, pancreas and adrenal glands are grossly unremarkable.    There is no hydronephrosis or urinary calculus. There are right-sided   renal calculi. The bladder is generally collapsed and demonstrates high   attenuation within the lumen. Please correlate for any recent studies   utilizing IV contrast.    There is no bowel obstruction.  There is no intraperitoneal free air.   There is no pneumatosis. There is again severe pan colitis There is   ascites around the liver and spleen and extending into the paracolic   gutters and pelvis and this is new from the prior study.The appendix is   not seen however there are no secondary signs of appendicitis.    There is no abdominal or pelvic lymphadenopathy. There is atherosclerotic   calcification of the aorta and its branches. There is IVC filter. The   pelvic structures are grossly unremarkable.    The aorta is not aneurysmal. There is no significant retroperitoneal or   pelvic lymphadenopathy. Pelvic structures are intact.    The bony structures demonstrate degenerative changes of the spine. There   is compression fracture of T12 again identified    IMPRESSION:  Redemonstration of severe pan colitis without evidence of pneumatosis  Diffuse high attenuation in the bladder. This may be secondary to   contrast excretion if this has easily be given intravenous contrast.   Alternatively this may or percent blood products or fistula to bowel   which is not visualized on this study. Correlation is advised.  Small to moderate amount of ascites compared to the previously seen trace   ascites  Right renal cysts  IVC filter    < end of copied text >

## 2018-07-10 NOTE — PROGRESS NOTE ADULT - SUBJECTIVE AND OBJECTIVE BOX
Patient is a 83y old  Male who presents with a chief complaint of complain of diarrhea, fever (06 Jul 2018 23:55)      HPI:  Pt is a 84 y/o M w/pmhx of Afib, CHF, CAD s/p stents, COPD, PNA, upper GI bleed (duodenal)  BIB EMS from [facility] assisted living for fever, abd pain, and diarrhea that began 3 weeks ago. Was in the hospital for PNA tx and was later dx with C-diff and started on a course of PO vancomycin for 10 days for the C-diff. States that he has had diarrhea since before the course of abx. Pain has been increased for the past few weeks and is characterized as a cramping feeling. Daughters also note that there is increased distension. Also notes chest pain characterized as a cramping in the central chest. 83% O2 sat noted this morning at the assisted living facility. Takes O2 routinely at night but not during the day. (06 Jul 2018 23:55)  Patient's daughter states he was treated for ESBL with antibiotics prior to this  7/10  seen and examined with his dter at bedside  hx of resp failure and intubation 2012 x 3-4 days  feels ok with breathing now  being evaluated for surgical interventions with pancolitis  possibility of post op need for vent support discussed witrh pt and his dtr  he wants to proceed understanding high risk for surgery due to compromised medical condition  hx SHAYY and treated with BIPAP in the past / stopped using it after wt loss    PAST MEDICAL & SURGICAL HISTORY:  Diastolic CHF  Peripheral vascular disease  Afib  Anemia  CKD (chronic kidney disease)  COPD (chronic obstructive pulmonary disease)  SHAYY (obstructive sleep apnea)  Sepsis, due to unspecified organism: 2/2 poorly healing wounds b/l  Dyspepsia: On moderate exertion.  Sleep apnea, obstructive: Requires home 02 therapy, and treatment with BIPAP  Atelectasis  Pleural effusion, bilateral  Respiratory failure  Peripheral edema  CRI (chronic renal insufficiency)  Gout  Benign prostatic hypertrophy  Spinal stenosis  Hypercholesterolemia  GERD (gastroesophageal reflux disease)  CAD (coronary artery disease)  Hypertension  S/P angioplasty with stent  Cataract of left eye  Prostate: Surgery green light procedure.  S/P rotator cuff surgery: Right  S/P angioplasty      PREVIOUS DIAGNOSTIC TESTING:    MEDICATIONS  (STANDING):  allopurinol 100 milliGRAM(s) Oral daily  buDESOnide   0.5 milliGRAM(s) Respule 0.5 milliGRAM(s) Inhalation two times a day  dextrose 5% 1000 milliLiter(s) (125 mL/Hr) IV Continuous <Continuous>  diltiazem    Tablet 30 milliGRAM(s) Oral every 6 hours  doxazosin 8 milliGRAM(s) Oral at bedtime  ferrous    sulfate 325 milliGRAM(s) Oral daily  finasteride 5 milliGRAM(s) Oral daily  isosorbide   mononitrate ER Tablet (IMDUR) 120 milliGRAM(s) Oral daily  metoprolol tartrate 25 milliGRAM(s) Oral two times a day  metroNIDAZOLE  IVPB      metroNIDAZOLE  IVPB 500 milliGRAM(s) IV Intermittent every 8 hours  simvastatin 10 milliGRAM(s) Oral at bedtime  vancomycin    Solution 500 milliGRAM(s) Oral every 6 hours      MEDICATIONS  (PRN):  acetaminophen   Tablet 650 milliGRAM(s) Oral every 8 hours PRN For Temp greater than 38 C (100.4 F)  acetaminophen   Tablet. 650 milliGRAM(s) Oral every 8 hours PRN Mild Pain (1 - 3)  ALBUTerol   0.5% 2.5 milliGRAM(s) Nebulizer every 4 hours PRN Shortness of Breath and/or Wheezing  morphine  - Injectable 2 milliGRAM(s) IV Push every 6 hours PRN Severe Pain (7 - 10)  ondansetron Injectable 4 milliGRAM(s) IV Push every 6 hours PRN Nausea and/or Vomiting      FAMILY HISTORY:  No pertinent family history in first degree relatives    REVIEW OF SYSTEM:  abdominal pain, otherwise 12 point ROS negative     Vital Signs Last 24 Hrs  T(C): 36.3 (10 Jul 2018 03:55), Max: 36.6 (09 Jul 2018 10:39)  T(F): 97.4 (10 Jul 2018 03:55), Max: 97.8 (09 Jul 2018 10:39)  HR: 83 (10 Jul 2018 03:55) (62 - 86)  BP: 122/34 (10 Jul 2018 03:55) (107/33 - 123/34)  BP(mean): --  RR: 18 (09 Jul 2018 10:39) (18 - 18)  SpO2: 91% (10 Jul 2018 03:55) (91% - 95%)    I&O's Summary    PHYSICAL EXAM  General Appearance: cooperative, no acute distress,   HEENT: PERRL, conjunctiva clear, EOM's intact, non injected pharynx, no exudate, TM   normal  Neck: Supple, , no adenopathy, thyroid: not enlarged, no carotid bruit or JVD  Back: Symmetric, no  tenderness,no soft tissue tenderness  Lungs: Diminished bilaterally R>L with some dullness to perciussion  needs assistance to sit upright for exam today  Heart: Regular rate and rhythm, S1, S2 normal, no murmur, rub or gallop  Abdomen: Soft, non-tender, bowel sounds active , no hepatosplenomegaly  Extremities: pos peripheral edema / Hx Right leg amputation  Skin: Skin color, texture normal, no rashes   Neurologic: Alert and oriented X3 , cranial nerves intact, sensory and motor normal,    ECG:    LABS:                          8.1    20.78 )-----------( 239      ( 09 Jul 2018 05:48 )             25.6     07-09    144  |  114<H>  |  65<H>  ----------------------------<  113<H>  4.5   |  17<L>  |  3.90<H>    Ca    7.1<L>      09 Jul 2018 05:48  Mg     1.9     07-09      CARDIAC MARKERS ( 07 Jul 2018 17:51 )  0.024 ng/mL / x     / x     / x     / x                  ABG - ( 08 Jul 2018 15:47 )  pH, Arterial: 7.18  pH, Blood: x     /  pCO2: 39    /  pO2: 68    / HCO3: 14    / Base Excess: -13.1 /  SaO2: 92                    RADIOLOGY & ADDITIONAL STUDIES:  IMPRESSION:     Severe pancolitis consistent with pseudomembranous colitis.    Mild pulmonary edema.    Small loculated right and trace layering left pleural effusions.

## 2018-07-10 NOTE — PROGRESS NOTE ADULT - ASSESSMENT
1) Clostridium Difficile Colitis  2) Metabolic Acidosis  3) Restrictive Ventilatory Disease  4) SHAYY  5) Bilateral Pleural Effusions    82 y/o M w/pmhx of Afib, CHF, CAD s/p stents, COPD, PNA, upper GI bleed (duodenal)  BIB EMS from [facility] assisted living for fever, abd pain, and diarrhea that began 3 weeks ago. Was in the hospital for ESBL and was later dx with C-diff and started on a course of PO vancomycin for 10 days for the C-diff. At the present time has diffuse abdominal pain, being treated Vancomycin and IV Metronidazole  ABG reviewed and reveals 7.18/39/68, LA normal limits  Etiology likely from sepsis   repeat ABG noted  ABG - ( 09 Jul 2018 11:50 )  pH, Arterial: 7.21  pH, Blood: x     /  pCO2: 42    /  pO2: 67    / HCO3: 16    / Base Excess: -10.6 /  SaO2: 91      Diuresis has been held in light of increasing Cr  If clinical status does not improve, would recommend IR guided thoracentesis of the right pleural effusion / hold off thoracentesis for now  will get repeat cxr today  At the present time, patient states he would like to be a full code  Continue monitoring hemodynamics (daughter states baseline diastolic tends to run between 25-40mmHg)   Monitor urine output aggressively   Used BIPAP/CPAP in the past (stopped as an outpatient after patient lost weight), may worsen intraabdominal pressures but if arrhythmias are present, may consider starting again.   Appreciate nephrology/cardiology/ID/GI/hospitalist recommendations  Will continue to monitor   hx of resp failure and intubation 2012 x 3-4 days  feels ok with breathing now  being evaluated for surgical interventions with pancolitis  possibility of post op need for vent support discussed with pt and his dtr  he wants to proceed understanding high risk for surgery due to compromised medical condition  hx SHAYY and treated with BIPAP in the past / stopped using it after wt loss  Thank you for the consult

## 2018-07-10 NOTE — CONSULT NOTE ADULT - PROVIDER SPECIALTY LIST ADULT
Cardiology
Colorectal Surgery
Gastroenterology
Infectious Disease
Nephrology
Pulmonology
Electrophysiology

## 2018-07-10 NOTE — CONSULT NOTE ADULT - PROBLEM SELECTOR RECOMMENDATION 2
- cannot be on oral anticoagulation due to massive GIB requiring 15 units PRBC's in past (as per daughter) - cannot be on oral anticoagulation due to massive GIB requiring 15 units PRBC's in past (as per daughter)  - will discuss possibility of Watchman as outpatinet.

## 2018-07-10 NOTE — PROGRESS NOTE ADULT - SUBJECTIVE AND OBJECTIVE BOX
Patient is a 83y old  Male who presents with a chief complaint of complain of diarrhea, fever.     HPI:  Pt is a 84 y/o M w/pmhx of Afib, CHF, CAD s/p stents, COPD, PNA, upper GI bleed (duodenal)  BIB EMS from [facility] assisted living for fever, abd pain, and diarrhea that began 3 weeks ago. Was in the hospital for PNA tx and was later dx with C-diff and started on a course of PO vancomycin for 10 days for the C-diff. States that he has had diarrhea since before the course of abx. Pain has been increased for the past few weeks and is characterized as a cramping feeling. Daughters also note that there is increased distension.     Pt's daughter at bedside this am. Pt denies any CP or SOB.    Diarrhea resolving.     7/10- pt seen and examined today am.  Daughter at bedside.     PAST MEDICAL & SURGICAL HISTORY:  Diastolic CHF  Peripheral vascular disease  Afib  Anemia  CKD (chronic kidney disease)  COPD (chronic obstructive pulmonary disease)  SHAYY (obstructive sleep apnea)  Sepsis, due to unspecified organism: 2/2 poorly healing wounds b/l  Dyspepsia: On moderate exertion.  Sleep apnea, obstructive: Requires home 02 therapy, and treatment with BIPAP  Atelectasis  Pleural effusion, bilateral  Respiratory failure  Peripheral edema  CRI (chronic renal insufficiency)  Gout  Benign prostatic hypertrophy  Spinal stenosis  Hypercholesterolemia  GERD (gastroesophageal reflux disease)  CAD (coronary artery disease)  Hypertension  S/P angioplasty with stent  Cataract of left eye  Prostate: Surgery green light procedure.  S/P rotator cuff surgery: Right  S/P angioplasty      MEDICATIONS  (STANDING):  allopurinol 100 milliGRAM(s) Oral daily  buDESOnide   0.5 milliGRAM(s) Respule 0.5 milliGRAM(s) Inhalation two times a day  dextrose 5% 1000 milliLiter(s) (75 mL/Hr) IV Continuous <Continuous>  diltiazem    Tablet 30 milliGRAM(s) Oral every 6 hours  doxazosin 8 milliGRAM(s) Oral at bedtime  ferrous    sulfate 325 milliGRAM(s) Oral daily  finasteride 5 milliGRAM(s) Oral daily  hydrALAZINE 50 milliGRAM(s) Oral two times a day  isosorbide   mononitrate ER Tablet (IMDUR) 120 milliGRAM(s) Oral daily  metoprolol tartrate 25 milliGRAM(s) Oral two times a day  metroNIDAZOLE  IVPB      metroNIDAZOLE  IVPB 500 milliGRAM(s) IV Intermittent every 8 hours  simvastatin 10 milliGRAM(s) Oral at bedtime  vancomycin    Solution 500 milliGRAM(s) Oral every 6 hours    MEDICATIONS  (PRN):  acetaminophen   Tablet 650 milliGRAM(s) Oral every 8 hours PRN For Temp greater than 38 C (100.4 F)  acetaminophen   Tablet. 650 milliGRAM(s) Oral every 8 hours PRN Mild Pain (1 - 3)  ALBUTerol   0.5% 2.5 milliGRAM(s) Nebulizer every 4 hours PRN Shortness of Breath and/or Wheezing  morphine  - Injectable 2 milliGRAM(s) IV Push every 6 hours PRN Severe Pain (7 - 10)  ondansetron Injectable 4 milliGRAM(s) IV Push every 6 hours PRN Nausea and/or Vomiting      FAMILY HISTORY:  No pertinent family history in first degree relatives      SOCIAL HISTORY:    REVIEW OF SYSTEMS:  CONSTITUTIONAL:    No fatigue, malaise, lethargy.  No fever or chills.  HEENT:  Eyes:  No visual changes.     ENT:  No epistaxis.  No sinus pain.    RESPIRATORY:  No cough.  No wheeze.  No hemoptysis.  No shortness of breath.  CARDIOVASCULAR:  No chest pains.  No palpitations. No shortness of breath, No orthopnea or PND.  GASTROINTESTINAL:  c/o abdominal pain.  No nausea or vomiting.    GENITOURINARY:    No hematuria.    MUSCULOSKELETAL:  No musculoskeletal pain.  No joint swelling.  No arthritis.  NEUROLOGICAL:  No tingling or numbness or weakness.  PSYCHIATRIC:  No confusion  SKIN:  No rashes.    ENDOCRINE:  No unexplained weight loss.  No polydipsia.   HEMATOLOGIC:  No anemia.  No prolonged or excessive bleeding.   ALLERGIC AND IMMUNOLOGIC:  No pruritus.          Vital Signs Last 24 Hrs  T(C): 36.4 (09 Jul 2018 04:00), Max: 36.7 (08 Jul 2018 11:40)  T(F): 97.6 (09 Jul 2018 04:00), Max: 98.1 (08 Jul 2018 11:40)  HR: 66 (09 Jul 2018 07:40) (66 - 80)  BP: 115/23 (09 Jul 2018 04:00) (109/28 - 137/33)  BP(mean): --  RR: 16 (09 Jul 2018 04:00) (16 - 18)  SpO2: 93% (09 Jul 2018 04:00) (91% - 95%)    PHYSICAL EXAM-    Constitutional: ill looking elderly male in no acute distress.     Head: Head is normocephalic and atraumatic.      Neck: No jugular venous distention. No audible carotid bruits. There are strong carotid pulses bilaterally. No JVD.     Cardiovascular: Regular rate and rhythm without S3, S4. No murmurs or rubs are appreciated.      Respiratory: Breath sounds are normal. No rales. No wheezing.    Abdomen: Soft, nontender, distended with positive bowel sounds.      Extremity: No tenderness. No  pitting edema     Neurologic: The patient is alert and oriented.      Skin: No rash, no obvious lesions noted.      Psychiatric: The patient appears to be emotionally stable.      INTERPRETATION OF TELEMETRY: Mobitz II     ECG:    I&O's Detail      LABS:                        8.1    20.78 )-----------( 239      ( 09 Jul 2018 05:48 )             25.6     07-09    144  |  114<H>  |  65<H>  ----------------------------<  113<H>  4.5   |  17<L>  |  3.90<H>    Ca    7.1<L>      09 Jul 2018 05:48  Mg     1.9     07-09      CARDIAC MARKERS ( 07 Jul 2018 17:51 )  0.024 ng/mL / x     / x     / x     / x              I&O's Summary    BNP  RADIOLOGY & ADDITIONAL STUDIES:

## 2018-07-10 NOTE — PROGRESS NOTE ADULT - ASSESSMENT
83 year old male with multiple medical comorbidities with C diff which has not improved with antibiotics. Stable severe pancolitis demonstrated on CT yesterday and WBC remains elevated at 20. Worsening renal function.     I discussed the role of surgery which entails total abdominal colectomy with end ileostomy. He is a very high risk patient from a cardiopulmonary standpoint but wishes to proceed if recommended. Unclear if he will improve without surgery but do not want to risk possible perforation which will make his survival more dismal. On the other hand, his high morbidity from surgery makes decision difficult.     I spoke to daughter about possibility of surgery as early as today. TO be done by Dr. Rashid who will assess patient. No timing on OR yet.

## 2018-07-10 NOTE — PROGRESS NOTE ADULT - SUBJECTIVE AND OBJECTIVE BOX
82 y/o M admitted 7/6/18 PMHx of Afib, CHF, CAD s/p stents, COPD, PNA, upper GI bleed (duodenal) - currently being treated for toxic megacolon and suspected C difficile colitis.  Pt with worsening respiratory function and renal function.    Also with metabolic acidosis.    d/w Dr Sanabria and pt and pt son and plan for transfer to ICU for insertion of HD cath and urgent HD.  All questions answered.        PAST MEDICAL & SURGICAL HISTORY:  Diastolic CHF  Peripheral vascular disease  Afib  Anemia  CKD (chronic kidney disease)  COPD (chronic obstructive pulmonary disease)  SHAYY (obstructive sleep apnea)  Sepsis, due to unspecified organism: 2/2 poorly healing wounds b/l  Dyspepsia: On moderate exertion.  Sleep apnea, obstructive: Requires home 02 therapy, and treatment with BIPAP  Atelectasis  Pleural effusion, bilateral  Respiratory failure  Peripheral edema  CRI (chronic renal insufficiency)  Gout  Benign prostatic hypertrophy  Spinal stenosis  Hypercholesterolemia  GERD (gastroesophageal reflux disease)  CAD (coronary artery disease)  Hypertension  S/P angioplasty with stent  Cataract of left eye  Prostate: Surgery green light procedure.  S/P rotator cuff surgery: Right  S/P angioplasty      FAMILY HISTORY:  No pertinent family history in first degree relatives      Social Hx:    Allergies    Zosyn (Rash)      ICU Vital Signs Last 24 Hrs  T(C): 36.4 (10 Jul 2018 14:45), Max: 36.6 (10 Jul 2018 11:34)  T(F): 97.6 (10 Jul 2018 14:45), Max: 97.8 (10 Jul 2018 11:34)  HR: 66 (10 Jul 2018 15:56) (65 - 86)  BP: 129/39 (10 Jul 2018 15:56) (107/33 - 129/39)  BP(mean): 58 (10 Jul 2018 15:30) (58 - 67)  RR: 19 (10 Jul 2018 15:56) (14 - 20)  SpO2: 93% (10 Jul 2018 15:56) (91% - 93%)          I&O's Summary    10 Jul 2018 07:01  -  10 Jul 2018 16:10  --------------------------------------------------------  IN: 100 mL / OUT: 10 mL / NET: 90 mL                              8.8    20.36 )-----------( 265      ( 10 Jul 2018 05:47 )             28.0       07-10    142  |  111<H>  |  68<H>  ----------------------------<  118<H>  4.7   |  19<L>  |  4.56<H>    Ca    6.9<L>      10 Jul 2018 05:47  Mg     1.9     07-09        PT/INR - ( 10 Jul 2018 11:56 )   PT: 11.7 sec;   INR: 1.08 ratio         PTT - ( 10 Jul 2018 11:56 )  PTT:31.5 sec    ABG - ( 09 Jul 2018 11:50 )  pH, Arterial: 7.21  pH, Blood: x     /  pCO2: 42    /  pO2: 67    / HCO3: 16    / Base Excess: -10.6 /  SaO2: 91          MEDICATIONS  (STANDING):  allopurinol 100 milliGRAM(s) Oral daily  buDESOnide   0.5 milliGRAM(s) Respule 0.5 milliGRAM(s) Inhalation two times a day  diltiazem    Tablet 30 milliGRAM(s) Oral every 6 hours  doxazosin 8 milliGRAM(s) Oral at bedtime  ferrous    sulfate 325 milliGRAM(s) Oral daily  finasteride 5 milliGRAM(s) Oral daily  isosorbide   mononitrate ER Tablet (IMDUR) 120 milliGRAM(s) Oral daily  metoprolol tartrate 25 milliGRAM(s) Oral two times a day  metroNIDAZOLE  IVPB      metroNIDAZOLE  IVPB 500 milliGRAM(s) IV Intermittent every 8 hours  simvastatin 10 milliGRAM(s) Oral at bedtime  vancomycin    Solution 500 milliGRAM(s) Oral every 6 hours    MEDICATIONS  (PRN):  acetaminophen   Tablet 650 milliGRAM(s) Oral every 8 hours PRN For Temp greater than 38 C (100.4 F)  acetaminophen   Tablet. 650 milliGRAM(s) Oral every 8 hours PRN Mild Pain (1 - 3)  ALBUTerol   0.5% 2.5 milliGRAM(s) Nebulizer every 4 hours PRN Shortness of Breath and/or Wheezing  HYDROmorphone  Injectable 0.5 milliGRAM(s) IV Push every 6 hours PRN Moderate Pain (4 - 6)  ondansetron Injectable 4 milliGRAM(s) IV Push every 6 hours PRN Nausea and/or Vomiting          DVT Prophylaxis: no chemoprophy due to hx of GI bleeding?     Advanced Directives: FULL  Discussed with: patient.    Visit Information: Critical care time 45 min.    ** Time is exclusive of billed procedures and/or teaching and/or routine family updates. 82 y/o M admitted 7/6/18 PMHx of Afib, CHF, CAD s/p stents, COPD, PNA, upper GI bleed (duodenal) - currently being treated suspected C difficile colitis.  Pt with worsening respiratory function and renal function.    Also with metabolic acidosis.    d/w Dr Sanabria and pt and pt son and plan for transfer to ICU for insertion of HD cath and urgent HD.  All questions answered.        PAST MEDICAL & SURGICAL HISTORY:  Diastolic CHF  Peripheral vascular disease  Afib  Anemia  CKD (chronic kidney disease)  COPD (chronic obstructive pulmonary disease)  SHAYY (obstructive sleep apnea)  Sepsis, due to unspecified organism: 2/2 poorly healing wounds b/l  Dyspepsia: On moderate exertion.  Sleep apnea, obstructive: Requires home 02 therapy, and treatment with BIPAP  Atelectasis  Pleural effusion, bilateral  Respiratory failure  Peripheral edema  CRI (chronic renal insufficiency)  Gout  Benign prostatic hypertrophy  Spinal stenosis  Hypercholesterolemia  GERD (gastroesophageal reflux disease)  CAD (coronary artery disease)  Hypertension  S/P angioplasty with stent  Cataract of left eye  Prostate: Surgery green light procedure.  S/P rotator cuff surgery: Right  S/P angioplasty      FAMILY HISTORY:  No pertinent family history in first degree relatives      Social Hx:    Allergies    Zosyn (Rash)      ICU Vital Signs Last 24 Hrs  T(C): 36.4 (10 Jul 2018 14:45), Max: 36.6 (10 Jul 2018 11:34)  T(F): 97.6 (10 Jul 2018 14:45), Max: 97.8 (10 Jul 2018 11:34)  HR: 66 (10 Jul 2018 15:56) (65 - 86)  BP: 129/39 (10 Jul 2018 15:56) (107/33 - 129/39)  BP(mean): 58 (10 Jul 2018 15:30) (58 - 67)  RR: 19 (10 Jul 2018 15:56) (14 - 20)  SpO2: 93% (10 Jul 2018 15:56) (91% - 93%)          I&O's Summary    10 Jul 2018 07:01  -  10 Jul 2018 16:10  --------------------------------------------------------  IN: 100 mL / OUT: 10 mL / NET: 90 mL                              8.8    20.36 )-----------( 265      ( 10 Jul 2018 05:47 )             28.0       07-10    142  |  111<H>  |  68<H>  ----------------------------<  118<H>  4.7   |  19<L>  |  4.56<H>    Ca    6.9<L>      10 Jul 2018 05:47  Mg     1.9     07-09        PT/INR - ( 10 Jul 2018 11:56 )   PT: 11.7 sec;   INR: 1.08 ratio         PTT - ( 10 Jul 2018 11:56 )  PTT:31.5 sec    ABG - ( 09 Jul 2018 11:50 )  pH, Arterial: 7.21  pH, Blood: x     /  pCO2: 42    /  pO2: 67    / HCO3: 16    / Base Excess: -10.6 /  SaO2: 91          MEDICATIONS  (STANDING):  allopurinol 100 milliGRAM(s) Oral daily  buDESOnide   0.5 milliGRAM(s) Respule 0.5 milliGRAM(s) Inhalation two times a day  diltiazem    Tablet 30 milliGRAM(s) Oral every 6 hours  doxazosin 8 milliGRAM(s) Oral at bedtime  ferrous    sulfate 325 milliGRAM(s) Oral daily  finasteride 5 milliGRAM(s) Oral daily  isosorbide   mononitrate ER Tablet (IMDUR) 120 milliGRAM(s) Oral daily  metoprolol tartrate 25 milliGRAM(s) Oral two times a day  metroNIDAZOLE  IVPB      metroNIDAZOLE  IVPB 500 milliGRAM(s) IV Intermittent every 8 hours  simvastatin 10 milliGRAM(s) Oral at bedtime  vancomycin    Solution 500 milliGRAM(s) Oral every 6 hours    MEDICATIONS  (PRN):  acetaminophen   Tablet 650 milliGRAM(s) Oral every 8 hours PRN For Temp greater than 38 C (100.4 F)  acetaminophen   Tablet. 650 milliGRAM(s) Oral every 8 hours PRN Mild Pain (1 - 3)  ALBUTerol   0.5% 2.5 milliGRAM(s) Nebulizer every 4 hours PRN Shortness of Breath and/or Wheezing  HYDROmorphone  Injectable 0.5 milliGRAM(s) IV Push every 6 hours PRN Moderate Pain (4 - 6)  ondansetron Injectable 4 milliGRAM(s) IV Push every 6 hours PRN Nausea and/or Vomiting          DVT Prophylaxis: no chemoprophy due to hx of GI bleeding?     Advanced Directives: FULL  Discussed with: patient.    Visit Information: Critical care time 45 min.    ** Time is exclusive of billed procedures and/or teaching and/or routine family updates.

## 2018-07-10 NOTE — PROGRESS NOTE ADULT - SUBJECTIVE AND OBJECTIVE BOX
Patient is a 83y old  Male who presents with a chief complaint of complain of diarrhea, fever (06 Jul 2018 23:55)      HPI:  Pt is a 84 y/o M w/pmhx of Afib, CHF, CAD s/p stents, COPD, PNA, upper GI bleed (duodenal)  BIB EMS from [facility] living for fever, abd pain, and diarrhea that began 3 weeks ago. Was in the hospital for PNA tx and was later dx with C-diff and started on a course of PO vancomycin for 10 days for the C-diff. States that he has had diarrhea since before the course of abx. Pain has been increased for the past few weeks and is characterized as a cramping feeling. Daughters also note that there is increased distension. Also notes chest pain characterized as a cramping in the central chest. 83% O2 sat noted this morning at the assisted living facility. Takes O2 routinely at night but not during the day. (06 Jul 2018 23:55)    Patient remains lethargic. He is arousable and mildly confused. He repeats the answers to questions over and over again.  Denies any abdominal pain but has significant abdominal tenderness.  Abdomen is more distended  History is per patient and daughter.  White count remains elevated.        PAST MEDICAL & SURGICAL HISTORY:  Diastolic CHF  Peripheral vascular disease  Afib  Anemia  CKD (chronic kidney disease)  COPD (chronic obstructive pulmonary disease)  SHAYY (obstructive sleep apnea)  Sepsis, due to unspecified organism: 2/2 poorly healing wounds b/l  Dyspepsia: On moderate exertion.  Sleep apnea, obstructive: Requires home 02 therapy, and treatment with BIPAP  Atelectasis  Pleural effusion, bilateral  Respiratory failure  Peripheral edema  CRI (chronic renal insufficiency)  Gout  Benign prostatic hypertrophy  Spinal stenosis  Hypercholesterolemia  GERD (gastroesophageal reflux disease)  CAD (coronary artery disease)  Hypertension  S/P angioplasty with stent  Cataract of left eye  Prostate: Surgery green light procedure.  S/P rotator cuff surgery: Right  S/P angioplasty      MEDICATIONS  (STANDING):  allopurinol 100 milliGRAM(s) Oral daily  buDESOnide   0.5 milliGRAM(s) Respule 0.5 milliGRAM(s) Inhalation two times a day  dextrose 5% 1000 milliLiter(s) (125 mL/Hr) IV Continuous <Continuous>  diltiazem    Tablet 30 milliGRAM(s) Oral every 6 hours  doxazosin 8 milliGRAM(s) Oral at bedtime  ferrous    sulfate 325 milliGRAM(s) Oral daily  finasteride 5 milliGRAM(s) Oral daily  isosorbide   mononitrate ER Tablet (IMDUR) 120 milliGRAM(s) Oral daily  metoprolol tartrate 25 milliGRAM(s) Oral two times a day  metroNIDAZOLE  IVPB      metroNIDAZOLE  IVPB 500 milliGRAM(s) IV Intermittent every 8 hours  simvastatin 10 milliGRAM(s) Oral at bedtime  vancomycin    Solution 500 milliGRAM(s) Oral every 6 hours    MEDICATIONS  (PRN):  acetaminophen   Tablet 650 milliGRAM(s) Oral every 8 hours PRN For Temp greater than 38 C (100.4 F)  acetaminophen   Tablet. 650 milliGRAM(s) Oral every 8 hours PRN Mild Pain (1 - 3)  ALBUTerol   0.5% 2.5 milliGRAM(s) Nebulizer every 4 hours PRN Shortness of Breath and/or Wheezing  HYDROmorphone  Injectable 0.5 milliGRAM(s) IV Push every 6 hours PRN Moderate Pain (4 - 6)  ondansetron Injectable 4 milliGRAM(s) IV Push every 6 hours PRN Nausea and/or Vomiting      Allergies    Zosyn (Rash)    Intolerances        SOCIAL HISTORY:NC    FAMILY HISTORY:  No pertinent family history in first degree relatives      REVIEW OF SYSTEMS:    CONSTITUTIONAL: No weakness, fevers or chills  EYES/ENT: No visual changes;  No vertigo or throat pain   NECK: No pain or stiffness  RESPIRATORY: No cough, wheezing, hemoptysis; No shortness of breath  CARDIOVASCULAR: No chest pain or palpitations  GENITOURINARY: No dysuria, frequency or hematuria  NEUROLOGICAL: No numbness or weakness  SKIN: No itching, burning, rashes, or lesions   All other review of systems is negative unless indicated above.    Vital Signs Last 24 Hrs  T(C): 36.3 (10 Jul 2018 03:55), Max: 36.6 (09 Jul 2018 10:39)  T(F): 97.4 (10 Jul 2018 03:55), Max: 97.8 (09 Jul 2018 10:39)  HR: 83 (10 Jul 2018 03:55) (62 - 86)  BP: 122/34 (10 Jul 2018 03:55) (107/33 - 123/34)  BP(mean): --  RR: 18 (09 Jul 2018 10:39) (18 - 18)  SpO2: 91% (10 Jul 2018 03:55) (91% - 95%)    PHYSICAL EXAM:    Constitutional: NAD, well-developed  HEENT: EOMI, throat clear  Neck: No LAD, supple  Respiratory: CTA and P  Cardiovascular: S1 and S2, RRR, no M  Gastrointestinal: BS+, soft,difuse distension and tenderness, mild rebound, neg HSM,  Extremities: No peripheral edema, neg clubing, cyanosis  Vascular: 2+ peripheral pulses  Neurological: A/O x 2, no focal deficits, lethargic  Psychiatric: Normal mood, normal affect  Skin: No rashes    LABS:  CBC Full  -  ( 10 Jul 2018 05:47 )  WBC Count : 20.36 K/uL  Hemoglobin : 8.8 g/dL  Hematocrit : 28.0 %  Platelet Count - Automated : 265 K/uL  Mean Cell Volume : 90.0 fl  Mean Cell Hemoglobin : 28.3 pg  Mean Cell Hemoglobin Concentration : 31.4 gm/dL  Auto Neutrophil # : x  Auto Lymphocyte # : x  Auto Monocyte # : x  Auto Eosinophil # : x  Auto Basophil # : x  Auto Neutrophil % : x  Auto Lymphocyte % : x  Auto Monocyte % : x  Auto Eosinophil % : x  Auto Basophil % : x    07-10    142  |  111<H>  |  68<H>  ----------------------------<  118<H>  4.7   |  19<L>  |  4.56<H>    Ca    6.9<L>      10 Jul 2018 05:47  Mg     1.9     07-09              RADIOLOGY & ADDITIONAL STUDIES:  < from: CT Abdomen and Pelvis No Cont (07.09.18 @ 13:07) >  EXAM:  CT ABDOMEN AND PELVIS                            PROCEDURE DATE:  07/09/2018          INTERPRETATION:  CLINICAL STATEMENT: evaluate for pneumatosis    TECHNIQUE: CT of the abdomen and pelvis was performed in the axial plane   utilizing thin slices without IV or oral contrast. Sagittal and coronal   reformatting was performed.    COMPARISON: 7/6/2018    FINDINGS:    The lower chest demonstrates small bilateral pleural effusions right   greater than left. There is passive atelectasis at the lung bases. There   is coronary artery calcification.    The evaluation of the abdominal viscera and the bowel is limited without   contrast.    The liver again demonstrates small hypodense focus in the right lobe   peripherally unchanged. There is also punctate calcification in the right   hepatic lobe again noted. The gallbladder is again moderately distended   and demonstrates calcified stone.    The spleen, pancreas and adrenal glands are grossly unremarkable.    There is no hydronephrosis or urinary calculus. There are right-sided   renal calculi. The bladder is generally collapsed and demonstrates high   attenuation within the lumen. Please correlate for any recent studies   utilizing IV contrast.    There is no bowel obstruction.  There is no intraperitoneal free air.   There is no pneumatosis. There is again severe pan colitis There is   ascites around the liver and spleen and extending into the paracolic   gutters and pelvis and this is new from the prior study.The appendix is   not seen however there are no secondary signs of appendicitis.    There is no abdominal or pelvic lymphadenopathy. There is atherosclerotic   calcification of the aorta and its branches. There is IVC filter. The   pelvic structures are grossly unremarkable.    The aorta is not aneurysmal. There is no significant retroperitoneal or   pelvic lymphadenopathy. Pelvic structures are intact.    The bony structures demonstrate degenerative changes of the spine. There   is compression fracture of T12 again identified    IMPRESSION:  Redemonstration of severe pan colitis without evidence of pneumatosis  Diffuse high attenuation in the bladder. This may be secondary to   contrast excretion if this has easily be given intravenous contrast.   Alternatively this may or percent blood products or fistula to bowel   which is not visualized on this study. Correlation is advised.  Small to moderate amount of ascites compared to the previously seen trace   ascites  Right renal cysts  IVC filter  Results were discussed with Dr. WARREN and at 2:30 PM on July 9, 2018.                FÉLIX CRUZ M.D.,ATTENDING RADIOLOGIST    < end of copied text >

## 2018-07-10 NOTE — CHART NOTE - NSCHARTNOTEFT_GEN_A_CORE
Notified by RN patient is agitated.    Notably admitted with suspected c. diff enterocolitis, possible candidate for subtotal colectomy. Patient reports he has significant abdominal pain. Does not answer questions appropriately and is pulling at lines and nasal cannula.  Patient is hemodynamically stable    PE  CV: S1/S2  Resp: CTA b/l  Abd: diffuse tenderness to palpation, no rebound tenderness or guarding. notably distended. positive bowel sounds  Peripheral: Pulses present, no edema    A/P  Altered mental status likely 2/2 delerium in setting of uncontrolled pain  -IV dilaudid 0.5 mg once  -continue to monitor clinically  -noted previous ABG with acidosis, however no CO2 retention  -consider constant observation

## 2018-07-10 NOTE — PROGRESS NOTE ADULT - ASSESSMENT
84 y/o wm with hx of ckd stage 3 ( scr 1.5-1.7) with ARYA due to volume depletion from CDiff associated colitis and diarrhea in presence of diuretic use PTA.  Now with non anion gap metabolic acidosis and worsening renal parameters.  CXR with mild CHF with hx of diastolic CHF.    PLAN  - obtain abd and lactate  - will change ivf and add bicarbonate and keep at current rate  - hold lasix done.   - fu uop and scr closely and will monitor respiratory status  - bp and hr stable now, cardizem adjusted and lopressor added    7/9 MK  - ARYA: worsening renal parameters with metabolic acidosis, non ag.  Previous lactate WNL and since placed on bicarbonate drip  fu repeat abg today.  Increase IVF rate  possibility of HD discussed with enio, hcp daughter, pt has been agreeable in past.   DC hydralazine  dc morphine and change to dilaudid  - Cdiff with rising scr and worsening abd distension: CRS consult ongoing  possible repeat ct scan  DW Dr ballesteros    7/10  Anuric  anasarca  Non anion gap acidosis on bicarb drip  ARYA, anuric  CKD 3 history  d/w Family, and pt agreeable  called ICU for line placement and to dialyze the pt in ICU for monitoring

## 2018-07-11 LAB
ADD ON TEST-SPECIMEN IN LAB: SIGNIFICANT CHANGE UP
ANION GAP SERPL CALC-SCNC: 11 MMOL/L — SIGNIFICANT CHANGE UP (ref 5–17)
BASE EXCESS BLDA CALC-SCNC: -2.8 MMOL/L — LOW (ref -2–2)
BUN SERPL-MCNC: 52 MG/DL — HIGH (ref 7–23)
CALCIUM SERPL-MCNC: 7 MG/DL — LOW (ref 8.5–10.1)
CHLORIDE SERPL-SCNC: 108 MMOL/L — SIGNIFICANT CHANGE UP (ref 96–108)
CK SERPL-CCNC: 84 U/L — SIGNIFICANT CHANGE UP (ref 26–308)
CO2 SERPL-SCNC: 23 MMOL/L — SIGNIFICANT CHANGE UP (ref 22–31)
CREAT SERPL-MCNC: 3.82 MG/DL — HIGH (ref 0.5–1.3)
CULTURE RESULTS: SIGNIFICANT CHANGE UP
GAS PNL BLDA: SIGNIFICANT CHANGE UP
GLUCOSE SERPL-MCNC: 80 MG/DL — SIGNIFICANT CHANGE UP (ref 70–99)
HAV IGM SER-ACNC: SIGNIFICANT CHANGE UP
HBV CORE IGM SER-ACNC: SIGNIFICANT CHANGE UP
HBV SURFACE AG SER-ACNC: SIGNIFICANT CHANGE UP
HCO3 BLDA-SCNC: 23 MMOL/L — SIGNIFICANT CHANGE UP (ref 21–29)
HCT VFR BLD CALC: 27 % — LOW (ref 39–50)
HCV AB S/CO SERPL IA: 0.05 S/CO — SIGNIFICANT CHANGE UP
HCV AB SERPL-IMP: SIGNIFICANT CHANGE UP
HGB BLD-MCNC: 8.6 G/DL — LOW (ref 13–17)
LACTATE SERPL-SCNC: 0.8 MMOL/L — SIGNIFICANT CHANGE UP (ref 0.7–2)
MAGNESIUM SERPL-MCNC: 1.9 MG/DL — SIGNIFICANT CHANGE UP (ref 1.6–2.6)
MCHC RBC-ENTMCNC: 28.4 PG — SIGNIFICANT CHANGE UP (ref 27–34)
MCHC RBC-ENTMCNC: 31.9 GM/DL — LOW (ref 32–36)
MCV RBC AUTO: 89.1 FL — SIGNIFICANT CHANGE UP (ref 80–100)
NRBC # BLD: 0 /100 WBCS — SIGNIFICANT CHANGE UP (ref 0–0)
PCO2 BLDA: 47 MMHG — HIGH (ref 32–46)
PH BLDA: 7.31 — LOW (ref 7.35–7.45)
PHOSPHATE SERPL-MCNC: 4.9 MG/DL — HIGH (ref 2.5–4.5)
PLATELET # BLD AUTO: 245 K/UL — SIGNIFICANT CHANGE UP (ref 150–400)
PO2 BLDA: 78 MMHG — SIGNIFICANT CHANGE UP (ref 74–108)
POTASSIUM SERPL-MCNC: 4 MMOL/L — SIGNIFICANT CHANGE UP (ref 3.5–5.3)
POTASSIUM SERPL-SCNC: 4 MMOL/L — SIGNIFICANT CHANGE UP (ref 3.5–5.3)
RBC # BLD: 3.03 M/UL — LOW (ref 4.2–5.8)
RBC # FLD: 20.1 % — HIGH (ref 10.3–14.5)
SAO2 % BLDA: 94 % — SIGNIFICANT CHANGE UP (ref 92–96)
SODIUM SERPL-SCNC: 142 MMOL/L — SIGNIFICANT CHANGE UP (ref 135–145)
SPECIMEN SOURCE: SIGNIFICANT CHANGE UP
WBC # BLD: 13.59 K/UL — HIGH (ref 3.8–10.5)
WBC # FLD AUTO: 13.59 K/UL — HIGH (ref 3.8–10.5)

## 2018-07-11 PROCEDURE — 74019 RADEX ABDOMEN 2 VIEWS: CPT | Mod: 26

## 2018-07-11 RX ORDER — ALBUMIN HUMAN 25 %
50 VIAL (ML) INTRAVENOUS
Qty: 0 | Refills: 0 | Status: DISCONTINUED | OUTPATIENT
Start: 2018-07-11 | End: 2018-07-18

## 2018-07-11 RX ORDER — HEPARIN SODIUM 5000 [USP'U]/ML
5000 INJECTION INTRAVENOUS; SUBCUTANEOUS EVERY 12 HOURS
Qty: 0 | Refills: 0 | Status: DISCONTINUED | OUTPATIENT
Start: 2018-07-11 | End: 2018-08-09

## 2018-07-11 RX ADMIN — HYDROMORPHONE HYDROCHLORIDE 0.5 MILLIGRAM(S): 2 INJECTION INTRAMUSCULAR; INTRAVENOUS; SUBCUTANEOUS at 18:27

## 2018-07-11 RX ADMIN — HYDROMORPHONE HYDROCHLORIDE 0.5 MILLIGRAM(S): 2 INJECTION INTRAMUSCULAR; INTRAVENOUS; SUBCUTANEOUS at 19:00

## 2018-07-11 RX ADMIN — Medication 100 MILLIGRAM(S): at 06:18

## 2018-07-11 RX ADMIN — Medication 325 MILLIGRAM(S): at 16:26

## 2018-07-11 RX ADMIN — HYDROMORPHONE HYDROCHLORIDE 0.5 MILLIGRAM(S): 2 INJECTION INTRAMUSCULAR; INTRAVENOUS; SUBCUTANEOUS at 11:56

## 2018-07-11 RX ADMIN — SIMVASTATIN 10 MILLIGRAM(S): 20 TABLET, FILM COATED ORAL at 21:25

## 2018-07-11 RX ADMIN — Medication 25 MILLIGRAM(S): at 17:04

## 2018-07-11 RX ADMIN — Medication 0.5 MILLIGRAM(S): at 20:40

## 2018-07-11 RX ADMIN — HYDROMORPHONE HYDROCHLORIDE 0.5 MILLIGRAM(S): 2 INJECTION INTRAMUSCULAR; INTRAVENOUS; SUBCUTANEOUS at 02:53

## 2018-07-11 RX ADMIN — Medication 8 MILLIGRAM(S): at 21:25

## 2018-07-11 RX ADMIN — Medication 500 MILLIGRAM(S): at 00:48

## 2018-07-11 RX ADMIN — Medication 100 MILLIGRAM(S): at 21:25

## 2018-07-11 RX ADMIN — Medication 50 MILLILITER(S): at 13:18

## 2018-07-11 RX ADMIN — Medication 50 MILLILITER(S): at 12:19

## 2018-07-11 RX ADMIN — HEPARIN SODIUM 5000 UNIT(S): 5000 INJECTION INTRAVENOUS; SUBCUTANEOUS at 17:03

## 2018-07-11 RX ADMIN — ISOSORBIDE MONONITRATE 120 MILLIGRAM(S): 60 TABLET, EXTENDED RELEASE ORAL at 16:25

## 2018-07-11 RX ADMIN — FINASTERIDE 5 MILLIGRAM(S): 5 TABLET, FILM COATED ORAL at 16:24

## 2018-07-11 RX ADMIN — ONDANSETRON 4 MILLIGRAM(S): 8 TABLET, FILM COATED ORAL at 00:48

## 2018-07-11 RX ADMIN — Medication 100 MILLIGRAM(S): at 18:25

## 2018-07-11 RX ADMIN — HYDROMORPHONE HYDROCHLORIDE 0.5 MILLIGRAM(S): 2 INJECTION INTRAMUSCULAR; INTRAVENOUS; SUBCUTANEOUS at 12:20

## 2018-07-11 RX ADMIN — HYDROMORPHONE HYDROCHLORIDE 0.5 MILLIGRAM(S): 2 INJECTION INTRAMUSCULAR; INTRAVENOUS; SUBCUTANEOUS at 03:30

## 2018-07-11 RX ADMIN — Medication 500 MILLIGRAM(S): at 06:18

## 2018-07-11 RX ADMIN — Medication 100 MILLIGRAM(S): at 16:26

## 2018-07-11 RX ADMIN — Medication 50 MILLILITER(S): at 14:20

## 2018-07-11 RX ADMIN — Medication 500 MILLIGRAM(S): at 17:03

## 2018-07-11 RX ADMIN — Medication 0.5 MILLIGRAM(S): at 10:15

## 2018-07-11 RX ADMIN — Medication 8 MILLIGRAM(S): at 00:47

## 2018-07-11 NOTE — PROGRESS NOTE ADULT - ASSESSMENT
83 year old male with multiple medical comorbidities with C diff colitis. WBC improved. Abdominal x ray pending, about 450 cc from NG since placement.     Continue antibiotics and supportive cares for now.

## 2018-07-11 NOTE — CHART NOTE - NSCHARTNOTEFT_GEN_A_CORE
Upon Nutritional Assessment by the Registered Dietitian your patient was determined to meet criteria / has evidence of the following diagnosis/diagnoses:          [ ]  Mild Protein Calorie Malnutrition        [ ]  Moderate Protein Calorie Malnutrition        [x] Severe Protein Calorie Malnutrition        [ ] Unspecified Protein Calorie Malnutrition        [ ] Underweight / BMI <19        [ ] Morbid Obesity / BMI > 40      Findings as based on:  •  Comprehensive nutrition assessment and consultation  •  Calorie counts (nutrient intake analysis)  •  Food acceptance and intake status from observations by staff  •  Follow up  •  Patient education  •  Intervention secondary to interdisciplinary rounds  •   concerns      Treatment:    1) initiate nutrition support if NPO continues > 7 days and reconsult RD for TPN/PPN recommendations.   2) Advance diet to clears if medically feasible.   3) monitor labs/lytes.  4) monitor daily weights          The following diet has been recommended:   Advance po diet if feasible to clear liquid diet.    PROVIDER Section:     By signing this assessment you are acknowledging and agree with the diagnosis/diagnoses assigned by the Registered Dietitian    Comments:

## 2018-07-11 NOTE — PROGRESS NOTE ADULT - SUBJECTIVE AND OBJECTIVE BOX
NEPHROLOGY INTERVAL HPI/OVERNIGHT EVENTS:   SY  Tolerated first HD fairly ok.  Alert and responsive.  Denies SOB.  Denies Abdominal pain today.    7/10  Incontinent pt , documented anuric  Straight cath x 1,  brown urine 200 ml  family at bedside  on ivf + bicarb @ 125 ml/hr    7/10 addendum  R IJ placed   family at bedside   dialysis to be initiated  pt comfortable  CXR no PTX    HPI:  84 y/o wm with hx of ckd stage 3 ( scr about 1.6-1.7) presents from Assisted living with fever, abd pain and diarrhea.  Had been dx with cdiff 3 weeks ago and placed on PO vanc at that time.  Since admission, Ct reveals pan colitis + cdiff and on increasing dose of po vanc.  Noted with rising scr despite ivf and renal consult requested.      Pt states that he feels better today.  MOre alert and awake.  diarrhea has decreased and his abd pain improved.  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,    MEDICATIONS  (STANDING):  allopurinol 100 milliGRAM(s) Oral daily  buDESOnide   0.5 milliGRAM(s) Respule 0.5 milliGRAM(s) Inhalation two times a day  diltiazem    Tablet 30 milliGRAM(s) Oral every 6 hours  doxazosin 8 milliGRAM(s) Oral at bedtime  ferrous    sulfate 325 milliGRAM(s) Oral daily  finasteride 5 milliGRAM(s) Oral daily  isosorbide   mononitrate ER Tablet (IMDUR) 120 milliGRAM(s) Oral daily  metoprolol tartrate 25 milliGRAM(s) Oral two times a day  metroNIDAZOLE  IVPB      metroNIDAZOLE  IVPB 500 milliGRAM(s) IV Intermittent every 8 hours  simvastatin 10 milliGRAM(s) Oral at bedtime  vancomycin    Solution 500 milliGRAM(s) Oral every 6 hours    MEDICATIONS  (PRN):  acetaminophen   Tablet 650 milliGRAM(s) Oral every 8 hours PRN For Temp greater than 38 C (100.4 F)  acetaminophen   Tablet. 650 milliGRAM(s) Oral every 8 hours PRN Mild Pain (1 - 3)  ALBUTerol   0.5% 2.5 milliGRAM(s) Nebulizer every 4 hours PRN Shortness of Breath and/or Wheezing  HYDROmorphone  Injectable 0.5 milliGRAM(s) IV Push every 6 hours PRN Moderate Pain (4 - 6)  ondansetron Injectable 4 milliGRAM(s) IV Push every 6 hours PRN Nausea and/or Vomiting          Vital Signs Last 24 Hrs  T(C): 36.2 (2018 07:00), Max: 36.9 (2018 05:00)  T(F): 97.1 (2018 07:00), Max: 98.4 (2018 05:00)  HR: 68 (2018 09:00) (62 - 77)  BP: 139/31 (2018 09:00) (112/38 - 150/38)  BP(mean): 60 (2018 09:00) (48 - 74)  RR: 13 (2018 09:00) (13 - 22)  SpO2: 95% (2018 09:00) (76% - 96%)  Daily     Daily Weight in k (10 Jul 2018 14:45)    0710 @ 07:  -  -11 @ 07:00  --------------------------------------------------------  IN: 300 mL / OUT: 530 mL / NET: -230 mL        PHYSICAL EXAM:  Alert and responsive  GENERAL: No distress  CHEST/LUNG: Bilateral rhonchi  HEART: S1S2 RRR  ABDOMEN: Distended  EXTREMITIES: Anasarca  SKIN:     LABS:                        8.6    13.59 )-----------( 245      ( 2018 06:20 )             27.0         142  |  108  |  52<H>  ----------------------------<  80  4.0   |  23  |  3.82<H>    Ca    7.0<L>      2018 06:20  Phos  4.9       Mg     1.9           PT/INR - ( 10 Jul 2018 11:56 )   PT: 11.7 sec;   INR: 1.08 ratio         PTT - ( 10 Jul 2018 11:56 )  PTT:31.5 sec    Magnesium, Serum: 1.9 mg/dL ( @ 06:20)  Phosphorus Level, Serum: 4.9 mg/dL ( @ 06:20)    ABG - ( 2018 05:18 )  pH, Arterial: 7.31  pH, Blood: x     /  pCO2: 47    /  pO2: 78    / HCO3: 23    / Base Excess: -2.8  /  SaO2: 94                    RADIOLOGY & ADDITIONAL TESTS:

## 2018-07-11 NOTE — PROGRESS NOTE ADULT - SUBJECTIVE AND OBJECTIVE BOX
Patient is a 83y old  Male who presents with a chief complaint of complain of diarrhea, fever (06 Jul 2018 23:55)    Date of service: 07-11-18 @ 13:24  Events noted; patient currently on hemodialysis; nursing notes no bowel movements today  ROS: unable to obtain secondary to patient medical condition     MEDICATIONS  (STANDING):  allopurinol 100 milliGRAM(s) Oral daily  buDESOnide   0.5 milliGRAM(s) Respule 0.5 milliGRAM(s) Inhalation two times a day  diltiazem    Tablet 30 milliGRAM(s) Oral every 6 hours  doxazosin 8 milliGRAM(s) Oral at bedtime  ferrous    sulfate 325 milliGRAM(s) Oral daily  finasteride 5 milliGRAM(s) Oral daily  heparin  Injectable 5000 Unit(s) SubCutaneous every 12 hours  isosorbide   mononitrate ER Tablet (IMDUR) 120 milliGRAM(s) Oral daily  metoprolol tartrate 25 milliGRAM(s) Oral two times a day  metroNIDAZOLE  IVPB      metroNIDAZOLE  IVPB 500 milliGRAM(s) IV Intermittent every 8 hours  simvastatin 10 milliGRAM(s) Oral at bedtime  vancomycin    Solution 500 milliGRAM(s) Oral every 6 hours    MEDICATIONS  (PRN):  acetaminophen   Tablet 650 milliGRAM(s) Oral every 8 hours PRN For Temp greater than 38 C (100.4 F)  acetaminophen   Tablet. 650 milliGRAM(s) Oral every 8 hours PRN Mild Pain (1 - 3)  albumin human 25% IVPB 50 milliLiter(s) IV Intermittent <User Schedule> PRN for SBP</=120  ALBUTerol   0.5% 2.5 milliGRAM(s) Nebulizer every 4 hours PRN Shortness of Breath and/or Wheezing  HYDROmorphone  Injectable 0.5 milliGRAM(s) IV Push every 6 hours PRN Moderate Pain (4 - 6)  ondansetron Injectable 4 milliGRAM(s) IV Push every 6 hours PRN Nausea and/or Vomiting      Vital Signs Last 24 Hrs  T(C): 36.9 (11 Jul 2018 12:13), Max: 36.9 (11 Jul 2018 05:00)  T(F): 98.4 (11 Jul 2018 12:13), Max: 98.4 (11 Jul 2018 05:00)  HR: 97 (11 Jul 2018 13:08) (62 - 120)  BP: 121/36 (11 Jul 2018 13:08) (96/72 - 150/38)  BP(mean): 53 (11 Jul 2018 13:00) (48 - 74)  RR: 20 (11 Jul 2018 13:08) (13 - 22)  SpO2: 92% (11 Jul 2018 13:08) (76% - 98%)    Physical Exam:      PE:    Constitutional: frail looking  HEENT: NC/AT, EOMI, PERRLA, conjunctivae clear; ears and nose atraumatic; pharynx clear  Neck: supple; thyroid not palpable  Respiratory: respiratory effort normal; clear to auscultation  Cardiovascular: S1S2 regular, no murmurs  Abdomen: soft, difusely tender, difusely distended, positive BS; no liver or spleen organomegaly  Genitourinary: no suprapubic tenderness  Musculoskeletal: s/p right lower extremity amputation; left lower extremity with dressing in place  Neurological/ Psychiatric: AxOx3, judgement and insight normal;  moving all extremities  Skin: no rashes; no palpable lesions    Labs: all available labs reviewed                        Labs:                         Labs:                        8.6    13.59 )-----------( 245      ( 11 Jul 2018 06:20 )             27.0     07-11    142  |  108  |  52<H>  ----------------------------<  80  4.0   |  23  |  3.82<H>    Ca    7.0<L>      11 Jul 2018 06:20  Phos  4.9     07-11  Mg     1.9     07-11             Cultures:       Culture - Blood (collected 07-06-18 @ 13:42)  Source: .Blood Blood-Peripheral  Preliminary Report (07-07-18 @ 18:03):    No growth to date.    Culture - Blood (collected 07-06-18 @ 13:35)  Source: .Blood Blood-Peripheral  Preliminary Report (07-07-18 @ 18:03):    No growth to date.                   Clostridium difficile Toxin by PCR (07.06.18 @ 16:19)    C Diff by PCR Result: Detected    Clostridium difficile Toxin by PCR: The results of this test should be interpreted with consideration of all  clinical and laboratory findings. This test determines the presence of  the C. difficile tcdB gene at a given time and is not intended to  identify antibiotic associated disease or C. difficile infection without  clinical context. Successful treatment is based on the resolution of  clinical symptoms. This test should not be used as a "test of cure"  because C. difficile DNA will persist after successful treatment. Repeat  testing will not be permitted.    This test is performed on the BD MAX system using Real-Time PCR and  fluorogenic target-specific hybridization.        < from: CT Abdomen and Pelvis w/ Oral Cont (07.06.18 @ 18:03) >    IMPRESSION:     Severe pancolitis consistent with pseudomembranous colitis.    Mild pulmonary edema.    Small loculated right and trace layering left pleural effusions.      < end of copied text >          Radiology: all available radiological tests reviewed    Advanced directives addressed: full resuscitation

## 2018-07-11 NOTE — DIETITIAN INITIAL EVALUATION ADULT. - ENERGY NEEDS
Ht.    67  "        Wt.191.8    #              BMI 31  (Adjusted for AKA)     #    Pt is at  140  %  IBW  #

## 2018-07-11 NOTE — PROGRESS NOTE ADULT - ASSESSMENT
Pseudomembranous colitis status post recent antibiotics  Nothing by mouth, NGT to LIS  By mouth vancomycin high-dose and IV Flagyl   case discussed with hospitalist and surgery, as well as family    Concern regarding rising white count, acidemia although the acidosis may be secondary to renal failure.  Additionally, increased abdominal distention and abdominal pain concerning  Colorectal surgery consultation appreciated  options of high risk surg DW daughterand surg, family would like to hold of for now  I had a discussion with the patient and his daughter regarding surgical interventions, daughter understands that this would be very high risk    A fibrillation with rapid ventricular rhythm, status post Cardizem drip.    Would hold anticoagulation secondary to history bleeding and possible surgery.    COPD obesity, obstructive sleep apnea, coronary artery disease, overall comorbid risk factors that would increase his risk of surgery.    IVF per renal

## 2018-07-11 NOTE — PROGRESS NOTE ADULT - ASSESSMENT
Patient with c. dif, with colonic ileus  acute renal failure  continue vancomycin, and flagyl  abx  no toxic megacolon  dialysis today  check f/u labs in am  may need to consider tpn/ppn if no improvement in day or two

## 2018-07-11 NOTE — PROGRESS NOTE ADULT - ASSESSMENT
1) Clostridium Difficile Colitis  2) Metabolic Acidosis  3) Restrictive Ventilatory Disease  4) SHAYY  5) Bilateral Pleural Effusions  6) Renal failure on dilaysis now    84 y/o M w/pmhx of Afib, CHF, CAD s/p stents, COPD, PNA, upper GI bleed (duodenal)  BIB EMS from [facility] assisted living for fever, abd pain, and diarrhea that began 3 weeks ago. Was in the hospital for ESBL and was later dx with C-diff and started on a course of PO vancomycin for 10 days for the C-diff. At the present time has diffuse abdominal pain, being treated Vancomycin and IV Metronidazole  ABG reviewed and reveals 7.18/39/68, LA normal limits  Etiology likely from sepsis   repeat ABG noted  ABG - ( 09 Jul 2018 11:50 )  pH, Arterial: 7.21  pH, Blood: x     /  pCO2: 42    /  pO2: 67    / HCO3: 16    / Base Excess: -10.6 /  SaO2: 91      repeat cxr noted  At the present time, patient states he would like to be a full code  Continue monitoring hemodynamics (daughter states baseline diastolic tends to run between 25-40mmHg)   Monitor urine output aggressively   Used BIPAP/CPAP in the past (stopped as an outpatient after patient lost weight), may worsen intraabdominal pressures but if arrhythmias are present, may consider starting again.   Appreciate nephrology/cardiology/ID/GI/hospitalist recommendations  Will continue to monitor   hx of resp failure and intubation 2012 x 3-4 days  feels ok with breathing now  being evaluated for surgical interventions with pancolitis  possibility of post op need for vent support discussed with pt and his dtr  he wants to proceed understanding high risk for surgery due to compromised medical condition  no need for thoracentesis    s/p Shiley and hemodialysis  still with abd distention  family wants to wait on abd surgery given he is high risk  overall prognosis remains guarded  hx SHAYY and treated with BIPAP in the past / stopped using it after wt loss  Thank you for the consult

## 2018-07-11 NOTE — PROGRESS NOTE ADULT - SUBJECTIVE AND OBJECTIVE BOX
Patient is a 83y old  Male who presents with a chief complaint of complain of diarrhea, fever (06 Jul 2018 23:55)  7/11- less diarrhea, no nelly or tachyarrhymias on tele       MEDICATIONS  (STANDING):  allopurinol 100 milliGRAM(s) Oral daily  buDESOnide   0.5 milliGRAM(s) Respule 0.5 milliGRAM(s) Inhalation two times a day  diltiazem    Tablet 30 milliGRAM(s) Oral every 6 hours  doxazosin 8 milliGRAM(s) Oral at bedtime  ferrous    sulfate 325 milliGRAM(s) Oral daily  finasteride 5 milliGRAM(s) Oral daily  isosorbide   mononitrate ER Tablet (IMDUR) 120 milliGRAM(s) Oral daily  metoprolol tartrate 25 milliGRAM(s) Oral two times a day  metroNIDAZOLE  IVPB      metroNIDAZOLE  IVPB 500 milliGRAM(s) IV Intermittent every 8 hours  simvastatin 10 milliGRAM(s) Oral at bedtime  vancomycin    Solution 500 milliGRAM(s) Oral every 6 hours    MEDICATIONS  (PRN):  acetaminophen   Tablet 650 milliGRAM(s) Oral every 8 hours PRN For Temp greater than 38 C (100.4 F)  acetaminophen   Tablet. 650 milliGRAM(s) Oral every 8 hours PRN Mild Pain (1 - 3)  ALBUTerol   0.5% 2.5 milliGRAM(s) Nebulizer every 4 hours PRN Shortness of Breath and/or Wheezing  HYDROmorphone  Injectable 0.5 milliGRAM(s) IV Push every 6 hours PRN Moderate Pain (4 - 6)  ondansetron Injectable 4 milliGRAM(s) IV Push every 6 hours PRN Nausea and/or Vomiting            Vital Signs Last 24 Hrs  T(C): 36.9 (11 Jul 2018 05:00), Max: 36.9 (11 Jul 2018 05:00)  T(F): 98.4 (11 Jul 2018 05:00), Max: 98.4 (11 Jul 2018 05:00)  HR: 77 (11 Jul 2018 06:00) (62 - 77)  BP: 135/41 (11 Jul 2018 05:00) (112/38 - 150/38)  BP(mean): 66 (11 Jul 2018 05:00) (56 - 74)  RR: 20 (11 Jul 2018 06:00) (13 - 21)  SpO2: 95% (11 Jul 2018 06:00) (76% - 96%)            INTERPRETATION OF TELEMETRY: SR PVCs    ECG:        LABS:                        8.6    13.59 )-----------( 245      ( 11 Jul 2018 06:20 )             27.0     07-11    142  |  108  |  52<H>  ----------------------------<  80  4.0   |  23  |  3.82<H>    Ca    7.0<L>      11 Jul 2018 06:20  Phos  4.9     07-11  Mg     1.9     07-11      CARDIAC MARKERS ( 11 Jul 2018 06:20 )  x     / x     / 84 U/L / x     / x          PT/INR - ( 10 Jul 2018 11:56 )   PT: 11.7 sec;   INR: 1.08 ratio         PTT - ( 10 Jul 2018 11:56 )  PTT:31.5 sec    I&O's Summary    10 Jul 2018 07:01  -  11 Jul 2018 07:00  --------------------------------------------------------  IN: 300 mL / OUT: 530 mL / NET: -230 mL      BNP  RADIOLOGY & ADDITIONAL STUDIES:

## 2018-07-11 NOTE — PROGRESS NOTE ADULT - ASSESSMENT
Raúl II- EP consult requested.  I dont see it on tele . Cont cardizem and lopresser for now     - he will need to be reevaluated as outpt for sleep apnea.  Pt asymptomatic.    Renal  insufficiency - worsening in the setting of diarrhea.    on HD now     Abdominal pain - Management pre primary team. ABD xray done this AM , less diarrhea cont PO VANCo and FLAgyl    Atrial fibrillation- off anticoagulation secondary to severe GI bleed in past.    Other medical issues- Management per primary team.   Thank you for allowing me to participate in the care of this patient. Please feel free to contact me with any questions.

## 2018-07-11 NOTE — PROGRESS NOTE ADULT - ASSESSMENT
84 y/o wm with hx of ckd stage 3 ( scr 1.5-1.7) with ARYA due to volume depletion from CDiff associated colitis and diarrhea in presence of diuretic use PTA.  Now with non anion gap metabolic acidosis and worsening renal parameters.  CXR with mild CHF with hx of diastolic CHF.    PLAN  - obtain abd and lactate  - will change ivf and add bicarbonate and keep at current rate  - hold lasix done.   - fu uop and scr closely and will monitor respiratory status  - bp and hr stable now, cardizem adjusted and lopressor added    7/9 MK  - ARYA: worsening renal parameters with metabolic acidosis, non ag.  Previous lactate WNL and since placed on bicarbonate drip  fu repeat abg today.  Increase IVF rate  possibility of HD discussed with enio, hcp daughter, pt has been agreeable in past.   DC hydralazine  dc morphine and change to dilaudid  - Cdiff with rising scr and worsening abd distension: CRS consult ongoing  possible repeat ct scan  DW Dr ballesteros    7/10  Anuric  anasarca  Non anion gap acidosis on bicarb drip  ARYA, anuric  CKD 3 history  d/w Family, and pt agreeable  called ICU for line placement and to dialyze the pt in ICU for monitoring    7/10  HD initiated via R temp HD catheter  tolerating well  no complaints  good abf    7/11 SY  --ARYA with Anasarca.  Urine output slightly improved.  However, remains quite fluid overloaded.  Will plan to continue HD until fluid status is much improved.  HD order written.  3 hour tx today.  Will aim to remove 2.5 kg as tolerated.  --C DIff colitis ; continue to monitor closely.

## 2018-07-11 NOTE — PROGRESS NOTE ADULT - SUBJECTIVE AND OBJECTIVE BOX
Patient is a 83y old  Male who presents with a chief complaint of complain of diarrhea, fever (06 Jul 2018 23:55)      HPI:  Pt is a 84 y/o M w/pmhx of Afib, CHF, CAD s/p stents, COPD, PNA, upper GI bleed (duodenal)  BIB EMS from [facility] assisted living for fever, abd pain, and diarrhea that began 3 weeks ago. Was in the hospital for PNA tx and was later dx with C-diff and started on a course of PO vancomycin for 10 days for the C-diff. States that he has had diarrhea since before the course of abx. Pain has been increased for the past few weeks and is characterized as a cramping feeling. Daughters also note that there is increased distension. Also notes chest pain characterized as a cramping in the central chest. 83% O2 sat noted this morning at the assisted living facility. Takes O2 routinely at night but not during the day. (06 Jul 2018 23:55)    Patient remains lethargic but more awake and recognizes me today.  states abd pain if pushhing on his bell  Denies any abdominal pain but has significant abdominal tenderness.  Abdomen is distended  History is per patient and daughter.  White count remains elevated, today pending  SP HD  neg BM and NGT inserted        PAST MEDICAL & SURGICAL HISTORY:  Diastolic CHF  Peripheral vascular disease  Afib  Anemia  CKD (chronic kidney disease)  COPD (chronic obstructive pulmonary disease)  SHAYY (obstructive sleep apnea)  Sepsis, due to unspecified organism: 2/2 poorly healing wounds b/l  Dyspepsia: On moderate exertion.  Sleep apnea, obstructive: Requires home 02 therapy, and treatment with BIPAP  Atelectasis  Pleural effusion, bilateral  Respiratory failure  Peripheral edema  CRI (chronic renal insufficiency)  Gout  Benign prostatic hypertrophy  Spinal stenosis  Hypercholesterolemia  GERD (gastroesophageal reflux disease)  CAD (coronary artery disease)  Hypertension  S/P angioplasty with stent  Cataract of left eye  Prostate: Surgery green light procedure.  S/P rotator cuff surgery: Right  S/P angioplasty      MEDICATIONS  (STANDING):  reviewed, now in ICU    MEDICATIONS  (PRN):  acetaminophen   Tablet 650 milliGRAM(s) Oral every 8 hours PRN For Temp greater than 38 C (100.4 F)  acetaminophen   Tablet. 650 milliGRAM(s) Oral every 8 hours PRN Mild Pain (1 - 3)  ALBUTerol   0.5% 2.5 milliGRAM(s) Nebulizer every 4 hours PRN Shortness of Breath and/or Wheezing  HYDROmorphone  Injectable 0.5 milliGRAM(s) IV Push every 6 hours PRN Moderate Pain (4 - 6)  ondansetron Injectable 4 milliGRAM(s) IV Push every 6 hours PRN Nausea and/or Vomiting      Allergies    Zosyn (Rash)    Intolerances        SOCIAL HISTORY:NC    FAMILY HISTORY:  No pertinent family history in first degree relatives      REVIEW OF SYSTEMS:    CONSTITUTIONAL: pos weakness,   EYES/ENT: No visual changes;  No vertigo or throat pain   NECK: No pain or stiffness  RESPIRATORY: No cough, wheezing, hemoptysis; No shortness of breath  CARDIOVASCULAR: No chest pain or palpitations  GENITOURINARY: No dysuria, frequency or hematuria  NEUROLOGICAL: No numbness or weakness  SKIN: No itching, burning, rashes, or lesions   All other review of systems is negative unless indicated above.    Vital Signs Last 24 Hrs  T(C): 36.3 (10 Jul 2018 03:55), Max: 36.6 (09 Jul 2018 10:39)  T(F): 97.4 (10 Jul 2018 03:55), Max: 97.8 (09 Jul 2018 10:39)  HR: 83 (10 Jul 2018 03:55) (62 - 86)  BP: 122/34 (10 Jul 2018 03:55) (107/33 - 123/34)  BP(mean): --  RR: 18 (09 Jul 2018 10:39) (18 - 18)  SpO2: 91% (10 Jul 2018 03:55) (91% - 95%)    PHYSICAL EXAM:    Constitutional: , well-developed  HEENT: EOMI, throat clear  Neck: No LAD, supple  Respiratory: CTA and P  Cardiovascular: S1 and S2, RRR, no M  Gastrointestinal: BS+, soft,difuse distension and tenderness, mild rebound tenderness, neg HSM,  Extremities: No peripheral edema, neg clubing, cyanosis  Vascular: 2+ peripheral pulses  Neurological: A/O x 3, no focal deficits, lethargic  Psychiatric: Normal mood, normal affect  Skin: No rashes    LABS:  CBC Full  -  ( 10 Jul 2018 05:47 )  WBC Count : 20.36 K/uL  Hemoglobin : 8.8 g/dL  Hematocrit : 28.0 %  Platelet Count - Automated : 265 K/uL  Mean Cell Volume : 90.0 fl  Mean Cell Hemoglobin : 28.3 pg  Mean Cell Hemoglobin Concentration : 31.4 gm/dL  Auto Neutrophil # : x  Auto Lymphocyte # : x  Auto Monocyte # : x  Auto Eosinophil # : x  Auto Basophil # : x  Auto Neutrophil % : x  Auto Lymphocyte % : x  Auto Monocyte % : x  Auto Eosinophil % : x  Auto Basophil % : x    07-10    142  |  111<H>  |  68<H>  ----------------------------<  118<H>  4.7   |  19<L>  |  4.56<H>    Ca    6.9<L>      10 Jul 2018 05:47  Mg     1.9     07-09 7/11 labs pending        RADIOLOGY & ADDITIONAL STUDIES:  < from: CT Abdomen and Pelvis No Cont (07.09.18 @ 13:07) >  EXAM:  CT ABDOMEN AND PELVIS                            PROCEDURE DATE:  07/09/2018          INTERPRETATION:  CLINICAL STATEMENT: evaluate for pneumatosis    TECHNIQUE: CT of the abdomen and pelvis was performed in the axial plane   utilizing thin slices without IV or oral contrast. Sagittal and coronal   reformatting was performed.    COMPARISON: 7/6/2018    FINDINGS:    The lower chest demonstrates small bilateral pleural effusions right   greater than left. There is passive atelectasis at the lung bases. There   is coronary artery calcification.    The evaluation of the abdominal viscera and the bowel is limited without   contrast.    The liver again demonstrates small hypodense focus in the right lobe   peripherally unchanged. There is also punctate calcification in the right   hepatic lobe again noted. The gallbladder is again moderately distended   and demonstrates calcified stone.    The spleen, pancreas and adrenal glands are grossly unremarkable.    There is no hydronephrosis or urinary calculus. There are right-sided   renal calculi. The bladder is generally collapsed and demonstrates high   attenuation within the lumen. Please correlate for any recent studies   utilizing IV contrast.    There is no bowel obstruction.  There is no intraperitoneal free air.   There is no pneumatosis. There is again severe pan colitis There is   ascites around the liver and spleen and extending into the paracolic   gutters and pelvis and this is new from the prior study.The appendix is   not seen however there are no secondary signs of appendicitis.    There is no abdominal or pelvic lymphadenopathy. There is atherosclerotic   calcification of the aorta and its branches. There is IVC filter. The   pelvic structures are grossly unremarkable.    The aorta is not aneurysmal. There is no significant retroperitoneal or   pelvic lymphadenopathy. Pelvic structures are intact.    The bony structures demonstrate degenerative changes of the spine. There   is compression fracture of T12 again identified    IMPRESSION:  Redemonstration of severe pan colitis without evidence of pneumatosis  Diffuse high attenuation in the bladder. This may be secondary to   contrast excretion if this has easily be given intravenous contrast.   Alternatively this may or percent blood products or fistula to bowel   which is not visualized on this study. Correlation is advised.  Small to moderate amount of ascites compared to the previously seen trace   ascites  Right renal cysts  IVC filter  Results were discussed with Dr. WARREN and at 2:30 PM on July 9, 2018.                FÉLIX CRUZ M.D.,ATTENDING RADIOLOGIST    < end of copied text >

## 2018-07-11 NOTE — PROGRESS NOTE ADULT - SUBJECTIVE AND OBJECTIVE BOX
Patient is a 83y old  Male who presents with a chief complaint of complain of diarrhea, fever (06 Jul 2018 23:55)      HPI:  Pt is a 82 y/o M w/pmhx of Afib, CHF, CAD s/p stents, COPD, PNA, upper GI bleed (duodenal)  BIB EMS from [facility] assisted living for fever, abd pain, and diarrhea that began 3 weeks ago. Was in the hospital for PNA tx and was later dx with C-diff and started on a course of PO vancomycin for 10 days for the C-diff. States that he has had diarrhea since before the course of abx. Pain has been increased for the past few weeks and is characterized as a cramping feeling. Daughters also note that there is increased distension. Also notes chest pain characterized as a cramping in the central chest. 83% O2 sat noted this morning at the assisted living facility. Takes O2 routinely at night but not during the day. (06 Jul 2018 23:55)  Patient's daughter states he was treated for ESBL with antibiotics prior to this  7/10  seen and examined with his dter at bedside  hx of resp failure and intubation 2012 x 3-4 days  feels ok with breathing now  being evaluated for surgical interventions with pancolitis  possibility of post op need for vent support discussed witrh pt and his dtr  he wants to proceed understanding high risk for surgery due to compromised medical condition  hx SHAYY and treated with BIPAP in the past / stopped using it after wt loss  7/11  seen in ICU with dtr at bedside  data discussed with critical care RN  s/p Ross and hemodialysis  still with abd distention  family wants to wait on abd surgery given he is high risk    PAST MEDICAL & SURGICAL HISTORY:  Diastolic CHF  Peripheral vascular disease  Afib  Anemia  CKD (chronic kidney disease)  COPD (chronic obstructive pulmonary disease)  SHAYY (obstructive sleep apnea)  Sepsis, due to unspecified organism: 2/2 poorly healing wounds b/l  Dyspepsia: On moderate exertion.  Sleep apnea, obstructive: Requires home 02 therapy, and treatment with BIPAP  Atelectasis  Pleural effusion, bilateral  Respiratory failure  Peripheral edema  CRI (chronic renal insufficiency)  Gout  Benign prostatic hypertrophy  Spinal stenosis  Hypercholesterolemia  GERD (gastroesophageal reflux disease)  CAD (coronary artery disease)  Hypertension  S/P angioplasty with stent  Cataract of left eye  Prostate: Surgery green light procedure.  S/P rotator cuff surgery: Right  S/P angioplasty      PREVIOUS DIAGNOSTIC TESTING:    MEDICATIONS  (STANDING):  allopurinol 100 milliGRAM(s) Oral daily  buDESOnide   0.5 milliGRAM(s) Respule 0.5 milliGRAM(s) Inhalation two times a day  diltiazem    Tablet 30 milliGRAM(s) Oral every 6 hours  doxazosin 8 milliGRAM(s) Oral at bedtime  ferrous    sulfate 325 milliGRAM(s) Oral daily  finasteride 5 milliGRAM(s) Oral daily  isosorbide   mononitrate ER Tablet (IMDUR) 120 milliGRAM(s) Oral daily  metoprolol tartrate 25 milliGRAM(s) Oral two times a day  metroNIDAZOLE  IVPB      metroNIDAZOLE  IVPB 500 milliGRAM(s) IV Intermittent every 8 hours  simvastatin 10 milliGRAM(s) Oral at bedtime  vancomycin    Solution 500 milliGRAM(s) Oral every 6 hours        MEDICATIONS  (PRN):  acetaminophen   Tablet 650 milliGRAM(s) Oral every 8 hours PRN For Temp greater than 38 C (100.4 F)  acetaminophen   Tablet. 650 milliGRAM(s) Oral every 8 hours PRN Mild Pain (1 - 3)  ALBUTerol   0.5% 2.5 milliGRAM(s) Nebulizer every 4 hours PRN Shortness of Breath and/or Wheezing  morphine  - Injectable 2 milliGRAM(s) IV Push every 6 hours PRN Severe Pain (7 - 10)  ondansetron Injectable 4 milliGRAM(s) IV Push every 6 hours PRN Nausea and/or Vomiting      FAMILY HISTORY:  No pertinent family history in first degree relatives    REVIEW OF SYSTEM:  abdominal pain, otherwise 12 point ROS negative     Vital Signs Last 24 Hrs  T(C): 36.9 (11 Jul 2018 05:00), Max: 36.9 (11 Jul 2018 05:00)  T(F): 98.4 (11 Jul 2018 05:00), Max: 98.4 (11 Jul 2018 05:00)  HR: 77 (11 Jul 2018 06:00) (62 - 77)  BP: 135/41 (11 Jul 2018 05:00) (112/38 - 150/38)  BP(mean): 66 (11 Jul 2018 05:00) (56 - 74)  RR: 20 (11 Jul 2018 06:00) (13 - 21)  SpO2: 95% (11 Jul 2018 06:00) (76% - 96%)    I&O's Summary    PHYSICAL EXAM  General Appearance: cooperative, no acute distress,   HEENT: PERRL, conjunctiva clear, EOM's intact, non injected pharynx, no exudate, TM   normal  Neck: Supple, , no adenopathy, thyroid: not enlarged, no carotid bruit or JVD  Back: Symmetric, no  tenderness,no soft tissue tenderness  Lungs: Diminished bilaterally R>L with some dullness to perciussion  needs assistance to sit upright for exam today  Heart: Regular rate and rhythm, S1, S2 normal, no murmur, rub or gallop  Abdomen: Soft, non-tender, bowel sounds active , no hepatosplenomegaly  Extremities: pos peripheral edema / Hx Right leg amputation  Skin: Skin color, texture normal, no rashes   Neurologic: Alert and oriented X3 , cranial nerves intact, sensory and motor normal,    ECG:    LABS:                        8.8    20.36 )-----------( 265      ( 10 Jul 2018 05:47 )             28.0   07-11    142  |  108  |  52<H>  ----------------------------<  80  4.0   |  23  |  3.82<H>    Ca    7.0<L>      11 Jul 2018 06:20  Phos  4.9     07-11  Mg     1.9     07-11                              8.1    20.78 )-----------( 239      ( 09 Jul 2018 05:48 )             25.6     07-09    144  |  114<H>  |  65<H>  ----------------------------<  113<H>  4.5   |  17<L>  |  3.90<H>    Ca    7.1<L>      09 Jul 2018 05:48  Mg     1.9     07-09      CARDIAC MARKERS ( 07 Jul 2018 17:51 )  0.024 ng/mL / x     / x     / x     / x                  ABG - ( 08 Jul 2018 15:47 )  pH, Arterial: 7.18  pH, Blood: x     /  pCO2: 39    /  pO2: 68    / HCO3: 14    / Base Excess: -13.1 /  SaO2: 92            < from: Xray Chest 1 View-PORTABLE IMMEDIATE (07.10.18 @ 15:50) >  INTERPRETATION:  CLINICAL INDICATION:  R  I J dialysis line placement    TECHNIQUE: Single view AP chest radiograph was performed.    COMPARISON:  Chest radiograph performed earlier on the same day,   7/10/2018 at 8:28 AM and chest radiograph dated 7/6/2018.    FINDINGS:    Interval placement of right-sided jugular central venous catheter with   tip projecting over the right atrium. No evidence forpneumothorax.    There is persistent mild pulmonary vascular congestion. There is trace   fluid within the right minor fissure.    The cardiac silhouette size is stable. Diffuse aortic calcifications are   noted    Redemonstration of anchor screw within the right humeral head.     IMPRESSION:    Interval placement of right-sided IJ CVC, with tip projecting over the   right atrium. No pneumothorax.     < end of copied text >          RADIOLOGY & ADDITIONAL STUDIES:  IMPRESSION:     Severe pancolitis consistent with pseudomembranous colitis.    Mild pulmonary edema.    Small loculated right and trace layering left pleural effusions.

## 2018-07-11 NOTE — DIETITIAN INITIAL EVALUATION ADULT. - OTHER INFO
82 y/o M admitted from [facility] on 7/6/18 w/ diarrhea. PMHx of Afib, CHF, AKA, CAD s/p stents, COPD, PNA, upper GI bleed (duodenal) -Pt with worsening respiratory function and renal function.  CT shows severe colitis, (+) ileus. (+) Cdiff. Also with metabolic acidosis. was dialyzed yesterday. NGT suctioning. Abdomen xray indicates no toxic megacolon. Dialysis today-noted. Current NPO status x 5 days, if po not feasible > 7 days, suggest initiating alternate route of nutrition (PPN/TPN). Pt and daughter report +weight loss x 1/1/2 years ~70# (including AKA). +Significant weight loss noted x 1 1/2 years (including amputation) 27% of UBW. Pt reports po good until day of hospital admission, however with significant weight loss question po intake PTA. Skin- Left calf venous stasis ulcer with +1/+2 edema documented. NFPE indicates severe muscle wasting: temporal, clavicle, and scapula. Pt meet criteria for severe protein/calorie malnutrition in context of acute illness secondary to significant weight loss, poor po intake < 75% x > 5 days, and severe muscle wasting. Recommendations: 1) initiate nutrition support if NPO continues > 7 days and reconsult RD for TPN/PPN recommendations. 2) Advance diet to clears if medically feasible. 3) monitor labs/lytes.4) monitor daily weights

## 2018-07-11 NOTE — PROGRESS NOTE ADULT - SUBJECTIVE AND OBJECTIVE BOX
Family decided to hold off surgery yesterday after discussion. Dialyzed yesterday. Unable to provide much information but states having some abdominal pain    Exam:  Vital Signs Last 24 Hrs  T(C): 36.2 (11 Jul 2018 07:00), Max: 36.9 (11 Jul 2018 05:00)  T(F): 97.1 (11 Jul 2018 07:00), Max: 98.4 (11 Jul 2018 05:00)  HR: 71 (11 Jul 2018 08:00) (62 - 77)  BP: 138/32 (11 Jul 2018 08:00) (112/38 - 150/38)  BP(mean): 59 (11 Jul 2018 08:00) (48 - 74)  RR: 22 (11 Jul 2018 08:00) (13 - 22)  SpO2: 89% (11 Jul 2018 08:00) (76% - 96%)    In no distress  Stable abdominal distension with diffuse tenderness but no peritonitis                          8.6    13.59 )-----------( 245      ( 11 Jul 2018 06:20 )             27.0   07-11    142  |  108  |  52<H>  ----------------------------<  80  4.0   |  23  |  3.82<H>    Ca    7.0<L>      11 Jul 2018 06:20  Phos  4.9     07-11  Mg     1.9     07-11

## 2018-07-11 NOTE — PROGRESS NOTE ADULT - ASSESSMENT
Pt is a 84 y/o M w/pmhx of Afib, CHF, CAD s/p stents, COPD, PNA, upper GI bleed (duodenal), PVD, s/p right lower extremity amputation,  recent hospitalization for pneumonia and subsequent Cdiff colitis admitted on 7/6 from assisted living for evaluation of persistent abdominal pain, fever and diarrhea that has been ongoing despite the po vancomycin course that the patient had been on. The patient also notes mid epigastric/chest pain. History per daughter at bedside and medical record as patient is poor historian.  1. Patient admitted with severe Cdiff pan colitis, also noted with leukocytosis most likely reactive to Cdiff infection  - follow up cultures   - iv hydration and supportive care   - serial cbc and monitor temperature   - day #5 vancomycin po 500 mg po q 6 hours  - iv flagyl day #4  - tolerating antibiotics without rashes or side effects   - GI evaluation in progress  - continue dialysis per nephrology; critical care  - continue contact isolation  - limit antibiotics exposure  2. other issues: Afib, CHF, CAD s/p stents, COPD, PNA, upper GI bleed (duodenal), PVD, s/p right lower extremity amputation  - per medicine

## 2018-07-11 NOTE — PROGRESS NOTE ADULT - SUBJECTIVE AND OBJECTIVE BOX
Events Overnight:     abdomen still distended , was dialyzed yesterday,  feels slightly better    HPI:     82 y/o M admitted 7/6/18 PMHx of Afib, CHF, CAD s/p stents, COPD, PNA, upper GI bleed (duodenal) - admitted with diarrhea,  Pt with worsening respiratory function and renal function.  CT shows severe colitis  Also with metabolic acidosis. was dialyzed yesterday.  has NGT , drained 450 cc for 24 hours.  axr no megacolon, wbc decreased from 20 12, no fevers.      PAST MEDICAL & SURGICAL HISTORY:  Diastolic CHF  Peripheral vascular disease  Afib  Anemia  CKD (chronic kidney disease)  COPD (chronic obstructive pulmonary disease)  SHAYY (obstructive sleep apnea)  Sepsis, due to unspecified organism: 2/2 poorly healing wounds b/l  Dyspepsia: On moderate exertion.  Sleep apnea, obstructive: Requires home 02 therapy, and treatment with BIPAP  Atelectasis  Pleural effusion, bilateral  Respiratory failure  Peripheral edema  CRI (chronic renal insufficiency)  Gout  Benign prostatic hypertrophy  Spinal stenosis  Hypercholesterolemia  GERD (gastroesophageal reflux disease)  CAD (coronary artery disease)  Hypertension  S/P angioplasty with stent  Cataract of left eye  Prostate: Surgery green light procedure.  S/P rotator cuff surgery: Right  S/P angioplasty      MEDICATIONS  (STANDING):  allopurinol 100 milliGRAM(s) Oral daily  buDESOnide   0.5 milliGRAM(s) Respule 0.5 milliGRAM(s) Inhalation two times a day  diltiazem    Tablet 30 milliGRAM(s) Oral every 6 hours  doxazosin 8 milliGRAM(s) Oral at bedtime  ferrous    sulfate 325 milliGRAM(s) Oral daily  finasteride 5 milliGRAM(s) Oral daily  isosorbide   mononitrate ER Tablet (IMDUR) 120 milliGRAM(s) Oral daily  metoprolol tartrate 25 milliGRAM(s) Oral two times a day  metroNIDAZOLE  IVPB      metroNIDAZOLE  IVPB 500 milliGRAM(s) IV Intermittent every 8 hours  simvastatin 10 milliGRAM(s) Oral at bedtime  vancomycin    Solution 500 milliGRAM(s) Oral every 6 hours    MEDICATIONS  (PRN):  acetaminophen   Tablet 650 milliGRAM(s) Oral every 8 hours PRN For Temp greater than 38 C (100.4 F)  acetaminophen   Tablet. 650 milliGRAM(s) Oral every 8 hours PRN Mild Pain (1 - 3)  albumin human 25% IVPB 50 milliLiter(s) IV Intermittent <User Schedule> PRN for SBP</=120  ALBUTerol   0.5% 2.5 milliGRAM(s) Nebulizer every 4 hours PRN Shortness of Breath and/or Wheezing  HYDROmorphone  Injectable 0.5 milliGRAM(s) IV Push every 6 hours PRN Moderate Pain (4 - 6)  ondansetron Injectable 4 milliGRAM(s) IV Push every 6 hours PRN Nausea and/or Vomiting      ICU Vital Signs Last 24 Hrs  T(C): 36.2 (11 Jul 2018 07:00), Max: 36.9 (11 Jul 2018 05:00)  T(F): 97.1 (11 Jul 2018 07:00), Max: 98.4 (11 Jul 2018 05:00)  HR: 68 (11 Jul 2018 09:00) (62 - 77)  BP: 139/31 (11 Jul 2018 09:00) (112/38 - 150/38)  BP(mean): 60 (11 Jul 2018 09:00) (48 - 74)  ABP: --  ABP(mean): --  RR: 13 (11 Jul 2018 09:00) (13 - 22)  SpO2: 95% (11 Jul 2018 09:00) (76% - 96%)          I&O's Summary    10 Jul 2018 07:01  -  11 Jul 2018 07:00  --------------------------------------------------------  IN: 300 mL / OUT: 530 mL / NET: -230 mL                            8.6    13.59 )-----------( 245      ( 11 Jul 2018 06:20 )             27.0       07-11    142  |  108  |  52<H>  ----------------------------<  80  4.0   |  23  |  3.82<H>    Ca    7.0<L>      11 Jul 2018 06:20  Phos  4.9     07-11  Mg     1.9     07-11      CARDIAC MARKERS ( 11 Jul 2018 06:20 )  x     / x     / 84 U/L / x     / x            ABG - ( 11 Jul 2018 05:18 )  pH, Arterial: 7.31  pH, Blood: x     /  pCO2: 47    /  pO2: 78    / HCO3: 23    / Base Excess: -2.8  /  SaO2: 94          DVT Prophylaxis:   Heparin subq                                                              Advanced Directives: Full code

## 2018-07-11 NOTE — DIETITIAN INITIAL EVALUATION ADULT. - NS AS NUTRI DX NUTRIENT
Pt meet criteria for severe protein/calorie malnutrition in context of acute illness secondary to significant weight loss, poor po intake < 75% x > 5 days, and severe muscle wasting./Malnutrition

## 2018-07-12 LAB
ADD ON TEST-SPECIMEN IN LAB: SIGNIFICANT CHANGE UP
ALBUMIN SERPL ELPH-MCNC: 2 G/DL — LOW (ref 3.3–5)
ANION GAP SERPL CALC-SCNC: 11 MMOL/L — SIGNIFICANT CHANGE UP (ref 5–17)
BUN SERPL-MCNC: 33 MG/DL — HIGH (ref 7–23)
CALCIUM SERPL-MCNC: 7.4 MG/DL — LOW (ref 8.5–10.1)
CHLORIDE SERPL-SCNC: 107 MMOL/L — SIGNIFICANT CHANGE UP (ref 96–108)
CO2 SERPL-SCNC: 23 MMOL/L — SIGNIFICANT CHANGE UP (ref 22–31)
CREAT SERPL-MCNC: 2.84 MG/DL — HIGH (ref 0.5–1.3)
GLUCOSE SERPL-MCNC: 66 MG/DL — LOW (ref 70–99)
HCT VFR BLD CALC: 26.8 % — LOW (ref 39–50)
HGB BLD-MCNC: 8.3 G/DL — LOW (ref 13–17)
MAGNESIUM SERPL-MCNC: 2 MG/DL — SIGNIFICANT CHANGE UP (ref 1.6–2.6)
MCHC RBC-ENTMCNC: 28.1 PG — SIGNIFICANT CHANGE UP (ref 27–34)
MCHC RBC-ENTMCNC: 31 GM/DL — LOW (ref 32–36)
MCV RBC AUTO: 90.8 FL — SIGNIFICANT CHANGE UP (ref 80–100)
NRBC # BLD: 0 /100 WBCS — SIGNIFICANT CHANGE UP (ref 0–0)
PHOSPHATE SERPL-MCNC: 4.3 MG/DL — SIGNIFICANT CHANGE UP (ref 2.5–4.5)
PLATELET # BLD AUTO: 202 K/UL — SIGNIFICANT CHANGE UP (ref 150–400)
POTASSIUM SERPL-MCNC: 3.9 MMOL/L — SIGNIFICANT CHANGE UP (ref 3.5–5.3)
POTASSIUM SERPL-SCNC: 3.9 MMOL/L — SIGNIFICANT CHANGE UP (ref 3.5–5.3)
RBC # BLD: 2.95 M/UL — LOW (ref 4.2–5.8)
RBC # FLD: 19.9 % — HIGH (ref 10.3–14.5)
SODIUM SERPL-SCNC: 141 MMOL/L — SIGNIFICANT CHANGE UP (ref 135–145)
WBC # BLD: 10.62 K/UL — HIGH (ref 3.8–10.5)
WBC # FLD AUTO: 10.62 K/UL — HIGH (ref 3.8–10.5)

## 2018-07-12 PROCEDURE — 74018 RADEX ABDOMEN 1 VIEW: CPT | Mod: 26

## 2018-07-12 PROCEDURE — 99233 SBSQ HOSP IP/OBS HIGH 50: CPT

## 2018-07-12 RX ORDER — IPRATROPIUM/ALBUTEROL SULFATE 18-103MCG
3 AEROSOL WITH ADAPTER (GRAM) INHALATION ONCE
Qty: 0 | Refills: 0 | Status: COMPLETED | OUTPATIENT
Start: 2018-07-12 | End: 2018-07-12

## 2018-07-12 RX ORDER — ALBUMIN HUMAN 25 %
50 VIAL (ML) INTRAVENOUS
Qty: 0 | Refills: 0 | Status: COMPLETED | OUTPATIENT
Start: 2018-07-12 | End: 2018-07-16

## 2018-07-12 RX ADMIN — Medication 325 MILLIGRAM(S): at 14:49

## 2018-07-12 RX ADMIN — Medication 500 MILLIGRAM(S): at 23:18

## 2018-07-12 RX ADMIN — FINASTERIDE 5 MILLIGRAM(S): 5 TABLET, FILM COATED ORAL at 14:48

## 2018-07-12 RX ADMIN — Medication 100 MILLIGRAM(S): at 15:01

## 2018-07-12 RX ADMIN — Medication 100 MILLIGRAM(S): at 14:49

## 2018-07-12 RX ADMIN — Medication 500 MILLIGRAM(S): at 00:58

## 2018-07-12 RX ADMIN — Medication 500 MILLIGRAM(S): at 05:29

## 2018-07-12 RX ADMIN — ISOSORBIDE MONONITRATE 120 MILLIGRAM(S): 60 TABLET, EXTENDED RELEASE ORAL at 14:49

## 2018-07-12 RX ADMIN — Medication 100 MILLIGRAM(S): at 21:19

## 2018-07-12 RX ADMIN — Medication 100 MILLIGRAM(S): at 05:29

## 2018-07-12 RX ADMIN — Medication 50 MILLILITER(S): at 12:03

## 2018-07-12 RX ADMIN — SIMVASTATIN 10 MILLIGRAM(S): 20 TABLET, FILM COATED ORAL at 21:19

## 2018-07-12 RX ADMIN — HEPARIN SODIUM 5000 UNIT(S): 5000 INJECTION INTRAVENOUS; SUBCUTANEOUS at 05:30

## 2018-07-12 RX ADMIN — HEPARIN SODIUM 5000 UNIT(S): 5000 INJECTION INTRAVENOUS; SUBCUTANEOUS at 17:59

## 2018-07-12 RX ADMIN — Medication 500 MILLIGRAM(S): at 17:58

## 2018-07-12 RX ADMIN — Medication 25 MILLIGRAM(S): at 17:58

## 2018-07-12 RX ADMIN — Medication 25 MILLIGRAM(S): at 05:29

## 2018-07-12 RX ADMIN — Medication 8 MILLIGRAM(S): at 21:19

## 2018-07-12 RX ADMIN — Medication 0.5 MILLIGRAM(S): at 21:02

## 2018-07-12 RX ADMIN — Medication 500 MILLIGRAM(S): at 14:49

## 2018-07-12 NOTE — PROGRESS NOTE ADULT - ASSESSMENT
82 y/o wm with hx of ckd stage 3 ( scr 1.5-1.7) with ARYA due to volume depletion from CDiff associated colitis and diarrhea in presence of diuretic use PTA.  Now with non anion gap metabolic acidosis and worsening renal parameters.  CXR with mild CHF with hx of diastolic CHF.    PLAN  - obtain abd and lactate  - will change ivf and add bicarbonate and keep at current rate  - hold lasix done.   - fu uop and scr closely and will monitor respiratory status  - bp and hr stable now, cardizem adjusted and lopressor added    7/9 MK  - ARYA: worsening renal parameters with metabolic acidosis, non ag.  Previous lactate WNL and since placed on bicarbonate drip  fu repeat abg today.  Increase IVF rate  possibility of HD discussed with enio, hcp daughter, pt has been agreeable in past.   DC hydralazine  dc morphine and change to dilaudid  - Cdiff with rising scr and worsening abd distension: CRS consult ongoing  possible repeat ct scan  DW Dr ballesteros    7/10  Anuric  anasarca  Non anion gap acidosis on bicarb drip  ARYA, anuric  CKD 3 history  d/w Family, and pt agreeable  called ICU for line placement and to dialyze the pt in ICU for monitoring    7/10  HD initiated via R temp HD catheter  tolerating well  no complaints  good abf    7/11 SY  --ARYA with Anasarca.  Urine output slightly improved.  However, remains quite fluid overloaded.  Will plan to continue HD until fluid status is much improved.  HD order written.  3 hour tx today.  Will aim to remove 2.5 kg as tolerated.  --C DIff colitis ; continue to monitor closely.    7/12 SY  --ARYA with Anasarca.   Remains Oligo-anuric.    --HD with goal of 2.5 kg UF again today.  --C Diff colitis.   Clinically improved   Continue to monitor.

## 2018-07-12 NOTE — PROGRESS NOTE ADULT - SUBJECTIVE AND OBJECTIVE BOX
Patient is a 83y old  Male who presents with a chief complaint of complain of diarrhea, fever (06 Jul 2018 23:55)      HPI:  Pt is a 82 y/o M w/pmhx of Afib, CHF, CAD s/p stents, COPD, PNA, upper GI bleed (duodenal)  BIB EMS from [facility] assisted living for fever, abd pain, and diarrhea that began 3 weeks ago. Was in the hospital for PNA tx and was later dx with C-diff and started on a course of PO vancomycin for 10 days for the C-diff. States that he has had diarrhea since before the course of abx. Pain has been increased for the past few weeks and is characterized as a cramping feeling. Daughters also note that there is increased distension. Also notes chest pain characterized as a cramping in the central chest. 83% O2 sat noted this morning at the assisted living facility. Takes O2 routinely at night but not during the day. (06 Jul 2018 23:55)      PAST MEDICAL & SURGICAL HISTORY:  Diastolic CHF  Peripheral vascular disease  Afib  Anemia  CKD (chronic kidney disease)  COPD (chronic obstructive pulmonary disease)  SHAYY (obstructive sleep apnea)  Sepsis, due to unspecified organism: 2/2 poorly healing wounds b/l  Dyspepsia: On moderate exertion.  Sleep apnea, obstructive: Requires home 02 therapy, and treatment with BIPAP  Atelectasis  Pleural effusion, bilateral  Respiratory failure  Peripheral edema  CRI (chronic renal insufficiency)  Gout  Benign prostatic hypertrophy  Spinal stenosis  Hypercholesterolemia  GERD (gastroesophageal reflux disease)  CAD (coronary artery disease)  Hypertension  S/P angioplasty with stent  Cataract of left eye  Prostate: Surgery green light procedure.  S/P rotator cuff surgery: Right  S/P angioplasty      MEDICATIONS  (STANDING):  ALBUTerol/ipratropium for Nebulization. 3 milliLiter(s) Nebulizer once  allopurinol 100 milliGRAM(s) Oral daily  buDESOnide   0.5 milliGRAM(s) Respule 0.5 milliGRAM(s) Inhalation two times a day  diltiazem    Tablet 30 milliGRAM(s) Oral every 6 hours  doxazosin 8 milliGRAM(s) Oral at bedtime  ferrous    sulfate 325 milliGRAM(s) Oral daily  finasteride 5 milliGRAM(s) Oral daily  heparin  Injectable 5000 Unit(s) SubCutaneous every 12 hours  isosorbide   mononitrate ER Tablet (IMDUR) 120 milliGRAM(s) Oral daily  metoprolol tartrate 25 milliGRAM(s) Oral two times a day  metroNIDAZOLE  IVPB      metroNIDAZOLE  IVPB 500 milliGRAM(s) IV Intermittent every 8 hours  simvastatin 10 milliGRAM(s) Oral at bedtime  vancomycin    Solution 500 milliGRAM(s) Oral every 6 hours    MEDICATIONS  (PRN):  acetaminophen   Tablet 650 milliGRAM(s) Oral every 8 hours PRN For Temp greater than 38 C (100.4 F)  acetaminophen   Tablet. 650 milliGRAM(s) Oral every 8 hours PRN Mild Pain (1 - 3)  albumin human 25% IVPB 50 milliLiter(s) IV Intermittent <User Schedule> PRN for SBP</=120  ALBUTerol   0.5% 2.5 milliGRAM(s) Nebulizer every 4 hours PRN Shortness of Breath and/or Wheezing  HYDROmorphone  Injectable 0.5 milliGRAM(s) IV Push every 6 hours PRN Moderate Pain (4 - 6)  ondansetron Injectable 4 milliGRAM(s) IV Push every 6 hours PRN Nausea and/or Vomiting      Allergies    Zosyn (Rash)    Intolerances        SOCIAL HISTORY:    FAMILY HISTORY:  No pertinent family history in first degree relatives      REVIEW OF SYSTEMS:    CONSTITUTIONAL: No weakness, fevers or chills  EYES/ENT: No visual changes;  No vertigo or throat pain   NECK: No pain or stiffness  RESPIRATORY: No cough, wheezing, hemoptysis; No shortness of breath  CARDIOVASCULAR: No chest pain or palpitations  GENITOURINARY: No dysuria, frequency or hematuria  NEUROLOGICAL: No numbness or weakness  SKIN: No itching, burning, rashes, or lesions   All other review of systems is negative unless indicated above.    Vital Signs Last 24 Hrs  T(C): 36.3 (12 Jul 2018 10:00), Max: 37.3 (11 Jul 2018 22:00)  T(F): 97.4 (12 Jul 2018 10:00), Max: 99.2 (11 Jul 2018 22:00)  HR: 59 (12 Jul 2018 10:00) (58 - 120)  BP: 124/34 (12 Jul 2018 10:00) (96/72 - 167/38)  BP(mean): 58 (12 Jul 2018 10:00) (50 - 95)  RR: 14 (12 Jul 2018 10:00) (11 - 21)  SpO2: 96% (12 Jul 2018 10:00) (87% - 99%)    PHYSICAL EXAM:    Constitutional: NAD, well-developed  HEENT: EOMI, throat clear  Neck: No LAD, supple  Respiratory: CTA and P  Cardiovascular: S1 and S2, RRR, no M  Gastrointestinal: BS+, soft, mild RLQ tend and dec distension, neg HSM,  Extremities: No peripheral edema, neg clubing, cyanosis, SP amputation  Vascular: 2+ peripheral pulses  Neurological: A/O x 3, no focal deficits  Psychiatric: Normal mood, normal affect  Skin: No rashes    LABS:  CBC Full  -  ( 12 Jul 2018 05:17 )  WBC Count : 10.62 K/uL  Hemoglobin : 8.3 g/dL  Hematocrit : 26.8 %  Platelet Count - Automated : 202 K/uL  Mean Cell Volume : 90.8 fl  Mean Cell Hemoglobin : 28.1 pg  Mean Cell Hemoglobin Concentration : 31.0 gm/dL  Auto Neutrophil # : x  Auto Lymphocyte # : x  Auto Monocyte # : x  Auto Eosinophil # : x  Auto Basophil # : x  Auto Neutrophil % : x  Auto Lymphocyte % : x  Auto Monocyte % : x  Auto Eosinophil % : x  Auto Basophil % : x    07-12    141  |  107  |  33<H>  ----------------------------<  66<L>  3.9   |  23  |  2.84<H>    Ca    7.4<L>      12 Jul 2018 05:17  Phos  4.3     07-12  Mg     2.0     07-12    TPro  x   /  Alb  2.0<L>  /  TBili  x   /  DBili  x   /  AST  x   /  ALT  x   /  AlkPhos  x   07-12    PT/INR - ( 10 Jul 2018 11:56 )   PT: 11.7 sec;   INR: 1.08 ratio         PTT - ( 10 Jul 2018 11:56 )  PTT:31.5 sec    07-10 @ 16:30  Hep A Igm Nonreact  Hep A total ab, IgA and M --  Hep B core Ab total --  Hep B core IgM Nonreact  Hep B DNA PCR --  Hep BSAg --  Hep BSAb --  Hep BSAb --  HCV Ab --  HCV RNA Log --  HCV RNA interp --                RADIOLOGY & ADDITIONAL STUDIES:  < from: Xray Abdomen 2 Views (07.11.18 @ 09:10) >  EXAM:  XR ABDOMEN 2V                            PROCEDURE DATE:  07/11/2018          INTERPRETATION:  Abdomen radiographs             CLINICAL INFORMATION:  Distention  Pain.    TECHNIQUE:  Supine and upright views of the abdomen were obtained.    FINDINGS:   7/10/2018 x-rays available for review.    The bowel gas pattern is significant for several loops of small bowel in   the central abdomen that show mild wall thickening suggesting enteritis.   Nasogastric tube tip in stomach.  No pathologiccalcifications are seen.    The osseous structures appear intact.           IMPRESSION:  several loops of small bowel in the central abdomen that   show mild wall thickening suggesting enteritis. Nasogastric tube tip in   stomach             < end of copied text >

## 2018-07-12 NOTE — PROGRESS NOTE ADULT - ASSESSMENT
Raúl II- EP consult appreciated. No recurrent events.  He will need reevaluation for sleep apnea as outpt.     NSVT, bigeminy- check magnesium level today.  IF <2 then consider administering 1 gm magnesium iv.      Renal  insufficiency - improving.     Abdominal pain - Management pre primary team.     Atrial fibrillation- off anticoagulation secondary to severe GI bleed in past.    Other medical issues- Management per primary team.   Thank you for allowing me to participate in the care of this patient. Please feel free to contact me with any questions.

## 2018-07-12 NOTE — PROGRESS NOTE ADULT - SUBJECTIVE AND OBJECTIVE BOX
Events Overnight: mad 2 small bowel movements                             wbc dec to 10  HPI:            Pt is a 82 y/o M w/pmhx of Afib, CHF, CAD s/p stents, COPD, PNA, upper GI bleed (duodenal)  BIB EMS from [facility] living for fever, abd pain, and diarrhea that began 3 weeks ago. Was in the hospital for PNA tx and was later dx with C-diff and started on a course of PO vancomycin for 10 days for the C-diff   Was he readmitted with abdominal distension, recurrent c. dif with abdominal distention and acute renal failure  on dialysis,  wbc has decreased, was dialyzed yesterday    Review of Systems:  · Negative General Symptoms	no chills	  · General Symptoms	fever	  · Negative Skin Symptoms	no rash; no itching	  · Negative Ophthalmologic Symptoms	no diplopia; no photophobia	  · Negative ENMT Symptoms	no hearing difficulty; no ear pain	  · Negative Respiratory and Thorax Symptoms	no wheezing; no dyspnea; no cough	  · Negative Cardiovascular Symptoms	no chest pain; no palpitations	  · Gastrointestinal Symptoms	 abdominal pain, slightly improved	  · Negative General Genitourinary Symptoms	no hematuria;  	  · Negative Musculoskeletal Symptoms	no arthralgia; no arthritis	  · Negative Neurological Symptoms	no neurologic defecits	  · Negative Psychiatric Symptoms	no suicidal ideation; no depression	  · Negative Hematology Symptoms	no gum bleeding; no nose bleeding	  · Negative Endocrine Symptoms	no cold intolerance; no heat intolerance	       MEDICATIONS  (STANDING):  ALBUTerol/ipratropium for Nebulization. 3 milliLiter(s) Nebulizer once  allopurinol 100 milliGRAM(s) Oral daily  buDESOnide   0.5 milliGRAM(s) Respule 0.5 milliGRAM(s) Inhalation two times a day  diltiazem    Tablet 30 milliGRAM(s) Oral every 6 hours  doxazosin 8 milliGRAM(s) Oral at bedtime  ferrous    sulfate 325 milliGRAM(s) Oral daily  finasteride 5 milliGRAM(s) Oral daily  heparin  Injectable 5000 Unit(s) SubCutaneous every 12 hours  isosorbide   mononitrate ER Tablet (IMDUR) 120 milliGRAM(s) Oral daily  metoprolol tartrate 25 milliGRAM(s) Oral two times a day  metroNIDAZOLE  IVPB      metroNIDAZOLE  IVPB 500 milliGRAM(s) IV Intermittent every 8 hours  simvastatin 10 milliGRAM(s) Oral at bedtime  vancomycin    Solution 500 milliGRAM(s) Oral every 6 hours    MEDICATIONS  (PRN):  acetaminophen   Tablet 650 milliGRAM(s) Oral every 8 hours PRN For Temp greater than 38 C (100.4 F)  acetaminophen   Tablet. 650 milliGRAM(s) Oral every 8 hours PRN Mild Pain (1 - 3)  albumin human 25% IVPB 50 milliLiter(s) IV Intermittent <User Schedule> PRN for SBP</=120  ALBUTerol   0.5% 2.5 milliGRAM(s) Nebulizer every 4 hours PRN Shortness of Breath and/or Wheezing  HYDROmorphone  Injectable 0.5 milliGRAM(s) IV Push every 6 hours PRN Moderate Pain (4 - 6)  ondansetron Injectable 4 milliGRAM(s) IV Push every 6 hours PRN Nausea and/or Vomiting                    ICU Vital Signs Last 24 Hrs  T(C): 36.4 (12 Jul 2018 06:00), Max: 37.3 (11 Jul 2018 22:00)  T(F): 97.5 (12 Jul 2018 06:00), Max: 99.2 (11 Jul 2018 22:00)  HR: 64 (12 Jul 2018 08:00) (60 - 120)  BP: 127/41 (12 Jul 2018 08:00) (96/72 - 167/38)  BP(mean): 63 (12 Jul 2018 08:00) (50 - 95)  ABP: --  ABP(mean): --  RR: 20 (12 Jul 2018 08:00) (11 - 21)  SpO2: 96% (12 Jul 2018 08:00) (87% - 99%)          I&O's Summary    11 Jul 2018 07:01  -  12 Jul 2018 07:00  --------------------------------------------------------  IN: 300 mL / OUT: 450 mL / NET: -150 mL                            8.3    10.62 )-----------( 202      ( 12 Jul 2018 05:17 )             26.8       07-12    141  |  107  |  33<H>  ----------------------------<  66<L>  3.9   |  23  |  2.84<H>    Ca    7.4<L>      12 Jul 2018 05:17  Phos  4.3     07-12  Mg     2.0     07-12    TPro  x   /  Alb  2.0<L>  /  TBili  x   /  DBili  x   /  AST  x   /  ALT  x   /  AlkPhos  x   07-12      CARDIAC MARKERS ( 11 Jul 2018 06:20 )  x     / x     / 84 U/L / x     / x            ABG - ( 11 Jul 2018 05:18 )  pH, Arterial: 7.31  pH, Blood: x     /  pCO2: 47    /  pO2: 78    / HCO3: 23    / Base Excess: -2.8  /  SaO2: 94          DVT Prophylaxis:   Heparin subq                                                              Advanced Directives: Full COde

## 2018-07-12 NOTE — CHART NOTE - NSCHARTNOTEFT_GEN_A_CORE
Brief Nutrition Note     Pt is a 82 y/o M w/pmhx of Afib, CHF, CAD s/p stents, COPD, PNA, upper GI bleed (duodenal)  BIB EMS from [facility] assisted living for fever, abd pain, and diarrhea that began 3 weeks ago. Was in the hospital for PNA tx and was later dx with C-diff and started on a course of PO vancomycin for 10 days for the C-diff   Was he readmitted with abdominal distension, recurrent c. dif with abdominal distention and acute renal failure on dialysis. Wbc has decreased, was dialyzed yesterday  NGT for suctioning D/C'd and pt to start Clear liquid diet. Will attempt to get pt OOB today.     Recommendations:  1) Advance po diet when feasible to low fiber.   2) Monitor weights/fluid shifting

## 2018-07-12 NOTE — PROGRESS NOTE ADULT - SUBJECTIVE AND OBJECTIVE BOX
No acute overnight events. Minimal NGT output. BMs x 2 over last 24 hrs. Reports very little abdominal pain.    MEDICATIONS  (STANDING):  ALBUTerol/ipratropium for Nebulization. 3 milliLiter(s) Nebulizer once  allopurinol 100 milliGRAM(s) Oral daily  buDESOnide   0.5 milliGRAM(s) Respule 0.5 milliGRAM(s) Inhalation two times a day  diltiazem    Tablet 30 milliGRAM(s) Oral every 6 hours  doxazosin 8 milliGRAM(s) Oral at bedtime  ferrous    sulfate 325 milliGRAM(s) Oral daily  finasteride 5 milliGRAM(s) Oral daily  heparin  Injectable 5000 Unit(s) SubCutaneous every 12 hours  isosorbide   mononitrate ER Tablet (IMDUR) 120 milliGRAM(s) Oral daily  metoprolol tartrate 25 milliGRAM(s) Oral two times a day  metroNIDAZOLE  IVPB      metroNIDAZOLE  IVPB 500 milliGRAM(s) IV Intermittent every 8 hours  simvastatin 10 milliGRAM(s) Oral at bedtime  vancomycin    Solution 500 milliGRAM(s) Oral every 6 hours    MEDICATIONS  (PRN):  acetaminophen   Tablet 650 milliGRAM(s) Oral every 8 hours PRN For Temp greater than 38 C (100.4 F)  acetaminophen   Tablet. 650 milliGRAM(s) Oral every 8 hours PRN Mild Pain (1 - 3)  albumin human 25% IVPB 50 milliLiter(s) IV Intermittent <User Schedule> PRN for SBP</=120  ALBUTerol   0.5% 2.5 milliGRAM(s) Nebulizer every 4 hours PRN Shortness of Breath and/or Wheezing  HYDROmorphone  Injectable 0.5 milliGRAM(s) IV Push every 6 hours PRN Moderate Pain (4 - 6)  ondansetron Injectable 4 milliGRAM(s) IV Push every 6 hours PRN Nausea and/or Vomiting    Vital Signs Last 24 Hrs  T(C): 36.4 (12 Jul 2018 06:00), Max: 37.3 (11 Jul 2018 22:00)  T(F): 97.5 (12 Jul 2018 06:00), Max: 99.2 (11 Jul 2018 22:00)  HR: 64 (12 Jul 2018 08:00) (60 - 120)  BP: 127/41 (12 Jul 2018 08:00) (96/72 - 167/38)  BP(mean): 63 (12 Jul 2018 08:00) (50 - 95)  RR: 20 (12 Jul 2018 08:00) (11 - 21)  SpO2: 96% (12 Jul 2018 08:00) (87% - 99%)  I&O's Detail    11 Jul 2018 07:01  -  12 Jul 2018 07:00  --------------------------------------------------------  IN:    IV PiggyBack: 300 mL  Total IN: 300 mL    OUT:    Nasoenteral Tube: 450 mL  Total OUT: 450 mL    Total NET: -150 mL    NAD  ABD exam : soft, NT, distended, no peritoneal signs                        8.3    10.62 )-----------( 202      ( 12 Jul 2018 05:17 )             26.8     07-12    141  |  107  |  33<H>  ----------------------------<  66<L>  3.9   |  23  |  2.84<H>    Ca    7.4<L>      12 Jul 2018 05:17  Phos  4.3     07-12  Mg     2.0     07-12    TPro  x   /  Alb  2.0<L>  /  TBili  x   /  DBili  x   /  AST  x   /  ALT  x   /  AlkPhos  x   07-12

## 2018-07-12 NOTE — PROGRESS NOTE ADULT - SUBJECTIVE AND OBJECTIVE BOX
NEPHROLOGY INTERVAL HPI/OVERNIGHT EVENTS:   SY  Feeling fairly ok today.  Surgery follow up appreciated.  To start clear liquid diet.  Denies SOB.     SY  Tolerated first HD fairly ok.  Alert and responsive.  Denies SOB.  Denies Abdominal pain today.    7/10  Incontinent pt , documented anuric  Straight cath x 1,  brown urine 200 ml  family at bedside  on ivf + bicarb @ 125 ml/hr    7/10 addendum  R IJ placed   family at bedside   dialysis to be initiated  pt comfortable  CXR no PTX    HPI:  84 y/o wm with hx of ckd stage 3 ( scr about 1.6-1.7) presents from Assisted living with fever, abd pain and diarrhea.  Had been dx with cdiff 3 weeks ago and placed on PO vanc at that time.  Since admission, Ct reveals pan colitis + cdiff and on increasing dose of po vanc.  Noted with rising scr despite ivf and renal consult requested.      Pt states that he feels better today.  MOre alert and awake.  diarrhea has decreased and his abd pain improved.  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 3 milliLiter(s) Nebulizer once  allopurinol 100 milliGRAM(s) Oral daily  buDESOnide   0.5 milliGRAM(s) Respule 0.5 milliGRAM(s) Inhalation two times a day  diltiazem    Tablet 30 milliGRAM(s) Oral every 6 hours  doxazosin 8 milliGRAM(s) Oral at bedtime  ferrous    sulfate 325 milliGRAM(s) Oral daily  finasteride 5 milliGRAM(s) Oral daily  heparin  Injectable 5000 Unit(s) SubCutaneous every 12 hours  isosorbide   mononitrate ER Tablet (IMDUR) 120 milliGRAM(s) Oral daily  metoprolol tartrate 25 milliGRAM(s) Oral two times a day  metroNIDAZOLE  IVPB      metroNIDAZOLE  IVPB 500 milliGRAM(s) IV Intermittent every 8 hours  simvastatin 10 milliGRAM(s) Oral at bedtime  vancomycin    Solution 500 milliGRAM(s) Oral every 6 hours    MEDICATIONS  (PRN):  acetaminophen   Tablet 650 milliGRAM(s) Oral every 8 hours PRN For Temp greater than 38 C (100.4 F)  acetaminophen   Tablet. 650 milliGRAM(s) Oral every 8 hours PRN Mild Pain (1 - 3)  albumin human 25% IVPB 50 milliLiter(s) IV Intermittent <User Schedule> PRN for SBP</=120  ALBUTerol   0.5% 2.5 milliGRAM(s) Nebulizer every 4 hours PRN Shortness of Breath and/or Wheezing  HYDROmorphone  Injectable 0.5 milliGRAM(s) IV Push every 6 hours PRN Moderate Pain (4 - 6)  ondansetron Injectable 4 milliGRAM(s) IV Push every 6 hours PRN Nausea and/or Vomiting          Vital Signs Last 24 Hrs  T(C): 36.4 (2018 06:00), Max: 37.3 (2018 22:00)  T(F): 97.5 (2018 06:00), Max: 99.2 (2018 22:00)  HR: 64 (2018 08:00) (60 - 120)  BP: 127/41 (2018 08:00) (96/72 - 167/38)  BP(mean): 63 (2018 08:00) (50 - 95)  RR: 20 (2018 08:00) (11 - 21)  SpO2: 96% (2018 08:00) (87% - 99%)  Daily     Daily Weight in k.4 (2018 11:58)    - @ 07:  -   @ 07:00  --------------------------------------------------------  IN: 300 mL / OUT: 450 mL / NET: -150 mL        PHYSICAL EXAM:  Alert and appropriate  GENERAL: No distress  CHEST/LUNG: Fair air entry   HEART: S1S2 RRR  ABDOMEN: distended.  EXTREMITIES: 3+ pitting edema in LE ext and abdominal wall.  SKIN:     LABS:                        8.3    10.62 )-----------( 202      ( 2018 05:17 )             26.8         141  |  107  |  33<H>  ----------------------------<  66<L>  3.9   |  23  |  2.84<H>    Ca    7.4<L>      2018 05:17  Phos  4.3       Mg     2.0         TPro  x   /  Alb  2.0<L>  /  TBili  x   /  DBili  x   /  AST  x   /  ALT  x   /  AlkPhos  x       PT/INR - ( 10 Jul 2018 11:56 )   PT: 11.7 sec;   INR: 1.08 ratio         PTT - ( 10 Jul 2018 11:56 )  PTT:31.5 sec    Phosphorus Level, Serum: 4.3 mg/dL ( @ 05:17)  Magnesium, Serum: 2.0 mg/dL ( @ 05:17)    ABG - ( 2018 05:18 )  pH, Arterial: 7.31  pH, Blood: x     /  pCO2: 47    /  pO2: 78    / HCO3: 23    / Base Excess: -2.8  /  SaO2: 94                    RADIOLOGY & ADDITIONAL TESTS:

## 2018-07-12 NOTE — PROGRESS NOTE ADULT - ASSESSMENT
Pseudomembranous colitis status post recent antibiotics    By mouth vancomycin high-dose and IV Flagyl   case discussed with  family  improving    Improved white count, acidemia although the acidosis may be secondary to renal failure.  improved and encouraging  Ileus appears to be resolving. Agree with discontinuing the NG tube and clear liquid diet.    Patient had a recent course of C. difficile that was treated with vancomycin with a recurrence and he's had C. difficile in the past.  Due to severe C. difficile, would strongly consider a prolonged taper of vancomycin.  For now, would complete two-week course of vancomycin and then would taper her vancomycin over a long time.  Discussed with family.      A fibrillation with rapid ventricular rhythm, status post Cardizem drip.    Would hold anticoagulation secondary to history bleeding and possible surgery.    COPD obesity, obstructive sleep apnea, coronary artery disease, overall comorbid risk factors that would increase his risk of surgery.    IVF per renal

## 2018-07-12 NOTE — PROGRESS NOTE ADULT - SUBJECTIVE AND OBJECTIVE BOX
Patient is a 83y old  Male who presents with a chief complaint of complain of diarrhea, fever.     HPI:  Pt is a 82 y/o M w/pmhx of Afib, CHF, CAD s/p stents, COPD, PNA, upper GI bleed (duodenal)  BIB EMS from [facility] assisted living for fever, abd pain, and diarrhea that began 3 weeks ago. Was in the hospital for PNA tx and was later dx with C-diff and started on a course of PO vancomycin for 10 days for the C-diff. States that he has had diarrhea since before the course of abx. Pain has been increased for the past few weeks and is characterized as a cramping feeling. Daughters also note that there is increased distension.     Pt's daughter at bedside this am. Pt denies any CP or SOB.    Diarrhea resolving.     7/10- pt seen and examined today am.  Daughter at bedside.     7/11- pt seen and examined by me today.  He denies any symptoms. Much alert today. NG tube in place./  Daughter at bedside.    PAST MEDICAL & SURGICAL HISTORY:  Diastolic CHF  Peripheral vascular disease  Afib  Anemia  CKD (chronic kidney disease)  COPD (chronic obstructive pulmonary disease)  SHAYY (obstructive sleep apnea)  Sepsis, due to unspecified organism: 2/2 poorly healing wounds b/l  Dyspepsia: On moderate exertion.  Sleep apnea, obstructive: Requires home 02 therapy, and treatment with BIPAP  Atelectasis  Pleural effusion, bilateral  Respiratory failure  Peripheral edema  CRI (chronic renal insufficiency)  Gout  Benign prostatic hypertrophy  Spinal stenosis  Hypercholesterolemia  GERD (gastroesophageal reflux disease)  CAD (coronary artery disease)  Hypertension  S/P angioplasty with stent  Cataract of left eye  Prostate: Surgery green light procedure.  S/P rotator cuff surgery: Right  S/P angioplasty      MEDICATIONS  (STANDING):  allopurinol 100 milliGRAM(s) Oral daily  buDESOnide   0.5 milliGRAM(s) Respule 0.5 milliGRAM(s) Inhalation two times a day  dextrose 5% 1000 milliLiter(s) (75 mL/Hr) IV Continuous <Continuous>  diltiazem    Tablet 30 milliGRAM(s) Oral every 6 hours  doxazosin 8 milliGRAM(s) Oral at bedtime  ferrous    sulfate 325 milliGRAM(s) Oral daily  finasteride 5 milliGRAM(s) Oral daily  hydrALAZINE 50 milliGRAM(s) Oral two times a day  isosorbide   mononitrate ER Tablet (IMDUR) 120 milliGRAM(s) Oral daily  metoprolol tartrate 25 milliGRAM(s) Oral two times a day  metroNIDAZOLE  IVPB      metroNIDAZOLE  IVPB 500 milliGRAM(s) IV Intermittent every 8 hours  simvastatin 10 milliGRAM(s) Oral at bedtime  vancomycin    Solution 500 milliGRAM(s) Oral every 6 hours    MEDICATIONS  (PRN):  acetaminophen   Tablet 650 milliGRAM(s) Oral every 8 hours PRN For Temp greater than 38 C (100.4 F)  acetaminophen   Tablet. 650 milliGRAM(s) Oral every 8 hours PRN Mild Pain (1 - 3)  ALBUTerol   0.5% 2.5 milliGRAM(s) Nebulizer every 4 hours PRN Shortness of Breath and/or Wheezing  morphine  - Injectable 2 milliGRAM(s) IV Push every 6 hours PRN Severe Pain (7 - 10)  ondansetron Injectable 4 milliGRAM(s) IV Push every 6 hours PRN Nausea and/or Vomiting      FAMILY HISTORY:  No pertinent family history in first degree relatives      SOCIAL HISTORY:    REVIEW OF SYSTEMS:  CONSTITUTIONAL:    No fatigue, malaise, lethargy.  No fever or chills.  HEENT:  Eyes:  No visual changes.     ENT:  No epistaxis.  No sinus pain.    RESPIRATORY:  No cough.  No wheeze.  No hemoptysis.  No shortness of breath.  CARDIOVASCULAR:  No chest pains.  No palpitations. No shortness of breath, No orthopnea or PND.  GASTROINTESTINAL:  no abdominal pain.  No nausea or vomiting.    GENITOURINARY:    No hematuria.    MUSCULOSKELETAL:  No musculoskeletal pain.  No joint swelling.  No arthritis.  NEUROLOGICAL:  No tingling or numbness or weakness.  PSYCHIATRIC:  No confusion  SKIN:  No rashes.    ENDOCRINE:  No unexplained weight loss.  No polydipsia.   HEMATOLOGIC:  No anemia.  No prolonged or excessive bleeding.   ALLERGIC AND IMMUNOLOGIC:  No pruritus.          Vital Signs Last 24 Hrs  T(C): 36.4 (09 Jul 2018 04:00), Max: 36.7 (08 Jul 2018 11:40)  T(F): 97.6 (09 Jul 2018 04:00), Max: 98.1 (08 Jul 2018 11:40)  HR: 66 (09 Jul 2018 07:40) (66 - 80)  BP: 115/23 (09 Jul 2018 04:00) (109/28 - 137/33)  BP(mean): --  RR: 16 (09 Jul 2018 04:00) (16 - 18)  SpO2: 93% (09 Jul 2018 04:00) (91% - 95%)    PHYSICAL EXAM-    Constitutional: ill looking elderly male in no acute distress.     Head: Head is normocephalic and atraumatic.      Neck: NG tube in place    Cardiovascular: Regular rate and rhythm without S3, S4. No murmurs or rubs are appreciated.      Respiratory: Breath sounds are normal. No rales. No wheezing.    Abdomen: Soft, nontender, distended with positive bowel sounds.      Extremity: No tenderness. No  pitting edema     Neurologic: The patient is alert and oriented.      Skin: No rash, no obvious lesions noted.      Psychiatric: The patient appears to be emotionally stable.      INTERPRETATION OF TELEMETRY: sinus rythm, NSVT, bigeminy    ECG:    I&O's Detail      LABS:                        8.1    20.78 )-----------( 239      ( 09 Jul 2018 05:48 )             25.6     07-09    144  |  114<H>  |  65<H>  ----------------------------<  113<H>  4.5   |  17<L>  |  3.90<H>    Ca    7.1<L>      09 Jul 2018 05:48  Mg     1.9     07-09      CARDIAC MARKERS ( 07 Jul 2018 17:51 )  0.024 ng/mL / x     / x     / x     / x              I&O's Summary    BNP  RADIOLOGY & ADDITIONAL STUDIES:

## 2018-07-12 NOTE — PROGRESS NOTE ADULT - SUBJECTIVE AND OBJECTIVE BOX
Patient is a 83y old  Male who presents with a chief complaint of complain of diarrhea, fever (06 Jul 2018 23:55)      HPI:  Pt is a 84 y/o M w/pmhx of Afib, CHF, CAD s/p stents, COPD, PNA, upper GI bleed (duodenal)  BIB EMS from [facility] assisted living for fever, abd pain, and diarrhea that began 3 weeks ago. Was in the hospital for PNA tx and was later dx with C-diff and started on a course of PO vancomycin for 10 days for the C-diff. States that he has had diarrhea since before the course of abx. Pain has been increased for the past few weeks and is characterized as a cramping feeling. Daughters also note that there is increased distension. Also notes chest pain characterized as a cramping in the central chest. 83% O2 sat noted this morning at the assisted living facility. Takes O2 routinely at night but not during the day. (06 Jul 2018 23:55)  Patient's daughter states he was treated for ESBL with antibiotics prior to this  7/10  seen and examined with his dter at bedside  hx of resp failure and intubation 2012 x 3-4 days  feels ok with breathing now  being evaluated for surgical interventions with pancolitis  possibility of post op need for vent support discussed witrh pt and his dtr  he wants to proceed understanding high risk for surgery due to compromised medical condition  hx SHAYY and treated with BIPAP in the past / stopped using it after wt loss  7/11  seen in ICU with dtr at bedside  data discussed with critical care RN  s/p Ross and hemodialysis  still with abd distention  family wants to wait on abd surgery given he is high risk  7/12  family at bedside updated  no cp  no difficulty breathing  intermittent wheezing  PAST MEDICAL & SURGICAL HISTORY:  Diastolic CHF  Peripheral vascular disease  Afib  Anemia  CKD (chronic kidney disease)  COPD (chronic obstructive pulmonary disease)  SHAYY (obstructive sleep apnea)  Sepsis, due to unspecified organism: 2/2 poorly healing wounds b/l  Dyspepsia: On moderate exertion.  Sleep apnea, obstructive: Requires home 02 therapy, and treatment with BIPAP  Atelectasis  Pleural effusion, bilateral  Respiratory failure  Peripheral edema  CRI (chronic renal insufficiency)  Gout  Benign prostatic hypertrophy  Spinal stenosis  Hypercholesterolemia  GERD (gastroesophageal reflux disease)  CAD (coronary artery disease)  Hypertension  S/P angioplasty with stent  Cataract of left eye  Prostate: Surgery green light procedure.  S/P rotator cuff surgery: Right  S/P angioplasty    MEDICATIONS  (STANDING):  allopurinol 100 milliGRAM(s) Oral daily  buDESOnide   0.5 milliGRAM(s) Respule 0.5 milliGRAM(s) Inhalation two times a day  diltiazem    Tablet 30 milliGRAM(s) Oral every 6 hours  doxazosin 8 milliGRAM(s) Oral at bedtime  ferrous    sulfate 325 milliGRAM(s) Oral daily  finasteride 5 milliGRAM(s) Oral daily  heparin  Injectable 5000 Unit(s) SubCutaneous every 12 hours  isosorbide   mononitrate ER Tablet (IMDUR) 120 milliGRAM(s) Oral daily  metoprolol tartrate 25 milliGRAM(s) Oral two times a day  metroNIDAZOLE  IVPB      metroNIDAZOLE  IVPB 500 milliGRAM(s) IV Intermittent every 8 hours  simvastatin 10 milliGRAM(s) Oral at bedtime  vancomycin    Solution 500 milliGRAM(s) Oral every 6 hours          MEDICATIONS  (PRN):  acetaminophen   Tablet 650 milliGRAM(s) Oral every 8 hours PRN For Temp greater than 38 C (100.4 F)  acetaminophen   Tablet. 650 milliGRAM(s) Oral every 8 hours PRN Mild Pain (1 - 3)  ALBUTerol   0.5% 2.5 milliGRAM(s) Nebulizer every 4 hours PRN Shortness of Breath and/or Wheezing  morphine  - Injectable 2 milliGRAM(s) IV Push every 6 hours PRN Severe Pain (7 - 10)  ondansetron Injectable 4 milliGRAM(s) IV Push every 6 hours PRN Nausea and/or Vomiting      FAMILY HISTORY:  No pertinent family history in first degree relatives    REVIEW OF SYSTEM:  abdominal pain, otherwise 12 point ROS negative     Vital Signs Last 24 Hrs  T(C): 36.4 (12 Jul 2018 06:00), Max: 37.3 (11 Jul 2018 22:00)  T(F): 97.5 (12 Jul 2018 06:00), Max: 99.2 (11 Jul 2018 22:00)  HR: 63 (12 Jul 2018 06:00) (60 - 120)  BP: 123/41 (12 Jul 2018 06:00) (96/72 - 167/38)  BP(mean): 62 (12 Jul 2018 06:00) (50 - 95)  RR: 18 (12 Jul 2018 06:00) (11 - 21)  SpO2: 95% (12 Jul 2018 06:00) (87% - 99%)  I&O's Summary    PHYSICAL EXAM  General Appearance: cooperative, no acute distress,   HEENT: PERRL, conjunctiva clear, EOM's intact, non injected pharynx, no exudate, TM   normal  Neck: Supple, , no adenopathy, thyroid: not enlarged, no carotid bruit or JVD  Back: Symmetric, no  tenderness,no soft tissue tenderness  Lungs: Diminished bilaterally R>L with some dullness to percussion  Heart: Regular rate and rhythm, S1, S2 normal, no murmur, rub or gallop  Abdomen:  decreased-tender,no active bowel sounds, Distended , no hepatosplenomegaly  Extremities: pos peripheral edema / Hx Right leg amputation  Skin: Skin color, texture normal, no rashes   Neurologic: Alert and oriented X3 , cranial nerves intact, sensory and motor normal,    ECG:    LABS:                          8.3    10.62 )-----------( 202      ( 12 Jul 2018 05:17 )             26.8   07-12    141  |  107  |  33<H>  ----------------------------<  66<L>  3.9   |  23  |  2.84<H>    Ca    7.4<L>      12 Jul 2018 05:17  Phos  4.3     07-12  Mg     2.0     07-12    TPro  x   /  Alb  2.0<L>  /  TBili  x   /  DBili  x   /  AST  x   /  ALT  x   /  AlkPhos  x   07-12                          8.8    20.36 )-----------( 265      ( 10 Jul 2018 05:47 )             28.0   07-11    142  |  108  |  52<H>  ----------------------------<  80  4.0   |  23  |  3.82<H>    Ca    7.0<L>      11 Jul 2018 06:20  Phos  4.9     07-11  Mg     1.9     07-11                              8.1    20.78 )-----------( 239      ( 09 Jul 2018 05:48 )             25.6     07-09    144  |  114<H>  |  65<H>  ----------------------------<  113<H>  4.5   |  17<L>  |  3.90<H>    Ca    7.1<L>      09 Jul 2018 05:48  Mg     1.9     07-09      CARDIAC MARKERS ( 07 Jul 2018 17:51 )  0.024 ng/mL / x     / x     / x     / x                  ABG - ( 08 Jul 2018 15:47 )  pH, Arterial: 7.18  pH, Blood: x     /  pCO2: 39    /  pO2: 68    / HCO3: 14    / Base Excess: -13.1 /  SaO2: 92            < from: Xray Chest 1 View-PORTABLE IMMEDIATE (07.10.18 @ 15:50) >  INTERPRETATION:  CLINICAL INDICATION:  R  I J dialysis line placement    TECHNIQUE: Single view AP chest radiograph was performed.    COMPARISON:  Chest radiograph performed earlier on the same day,   7/10/2018 at 8:28 AM and chest radiograph dated 7/6/2018.    FINDINGS:    Interval placement of right-sided jugular central venous catheter with   tip projecting over the right atrium. No evidence forpneumothorax.    There is persistent mild pulmonary vascular congestion. There is trace   fluid within the right minor fissure.    The cardiac silhouette size is stable. Diffuse aortic calcifications are   noted    Redemonstration of anchor screw within the right humeral head.     IMPRESSION:    Interval placement of right-sided IJ CVC, with tip projecting over the   right atrium. No pneumothorax.     < end of copied text >          RADIOLOGY & ADDITIONAL STUDIES:  IMPRESSION:     Severe pancolitis consistent with pseudomembranous colitis.    Mild pulmonary edema.    Small loculated right and trace layering left pleural effusions.

## 2018-07-12 NOTE — PROGRESS NOTE ADULT - ASSESSMENT
1) Clostridium Difficile Colitis  2) Metabolic Acidosis  3) Restrictive Ventilatory Disease  4) SHAYY  5) Bilateral Pleural Effusions  6) Renal failure on dilaysis now  7) Leukocytosis is clinically improved    82 y/o M w/pmhx of Afib, CHF, CAD s/p stents, COPD, PNA, upper GI bleed (duodenal)  BIB EMS from [facility] assisted living for fever, abd pain, and diarrhea that began 3 weeks ago. Was in the hospital for ESBL and was later dx with C-diff and started on a course of PO vancomycin for 10 days for the C-diff. At the present time has diffuse abdominal pain, being treated Vancomycin and IV Metronidazole  ABG reviewed and reveals 7.18/39/68, LA normal limits  Etiology likely from sepsis   repeat ABG noted  ABG - ( 09 Jul 2018 11:50 )  pH, Arterial: 7.21  pH, Blood: x     /  pCO2: 42    /  pO2: 67    / HCO3: 16    / Base Excess: -10.6 /  SaO2: 91      repeat cxr noted  At the present time, patient states he would like to be a full code  Continue monitoring hemodynamics (daughter states baseline diastolic tends to run between 25-40mmHg)   Monitor urine output aggressively   Used BIPAP/CPAP in the past (stopped as an outpatient after patient lost weight), may worsen intraabdominal pressures but if arrhythmias are present, may consider starting again.   Appreciate nephrology/cardiology/ID/GI/hospitalist recommendations  Will continue to monitor   hx of resp failure and intubation 2012 x 3-4 days  feels ok with breathing now  being evaluated for surgical interventions with pancolitis  possibility of post op need for vent support discussed with pt and his dtr  he wants to proceed understanding high risk for surgery due to compromised medical condition  no need for thoracentesis    s/p Shiley and hemodialysis  still with abd distention  family wants to wait on abd surgery given he is high risk  overall prognosis remains guarded  improving clinically with decreased leukocytosis  hx SHAYY and treated with BIPAP in the past / stopped using it after wt loss  Thank you for the consult

## 2018-07-13 LAB
ANION GAP SERPL CALC-SCNC: 10 MMOL/L — SIGNIFICANT CHANGE UP (ref 5–17)
BUN SERPL-MCNC: 26 MG/DL — HIGH (ref 7–23)
CALCIUM SERPL-MCNC: 7.2 MG/DL — LOW (ref 8.5–10.1)
CHLORIDE SERPL-SCNC: 107 MMOL/L — SIGNIFICANT CHANGE UP (ref 96–108)
CO2 SERPL-SCNC: 26 MMOL/L — SIGNIFICANT CHANGE UP (ref 22–31)
CREAT SERPL-MCNC: 2.62 MG/DL — HIGH (ref 0.5–1.3)
GLUCOSE SERPL-MCNC: 89 MG/DL — SIGNIFICANT CHANGE UP (ref 70–99)
HCT VFR BLD CALC: 26.1 % — LOW (ref 39–50)
HGB BLD-MCNC: 8.2 G/DL — LOW (ref 13–17)
MCHC RBC-ENTMCNC: 27.6 PG — SIGNIFICANT CHANGE UP (ref 27–34)
MCHC RBC-ENTMCNC: 31.4 GM/DL — LOW (ref 32–36)
MCV RBC AUTO: 87.9 FL — SIGNIFICANT CHANGE UP (ref 80–100)
NRBC # BLD: 0 /100 WBCS — SIGNIFICANT CHANGE UP (ref 0–0)
PLATELET # BLD AUTO: 183 K/UL — SIGNIFICANT CHANGE UP (ref 150–400)
POTASSIUM SERPL-MCNC: 3.5 MMOL/L — SIGNIFICANT CHANGE UP (ref 3.5–5.3)
POTASSIUM SERPL-SCNC: 3.5 MMOL/L — SIGNIFICANT CHANGE UP (ref 3.5–5.3)
RBC # BLD: 2.97 M/UL — LOW (ref 4.2–5.8)
RBC # FLD: 19.4 % — HIGH (ref 10.3–14.5)
SODIUM SERPL-SCNC: 143 MMOL/L — SIGNIFICANT CHANGE UP (ref 135–145)
WBC # BLD: 10.15 K/UL — SIGNIFICANT CHANGE UP (ref 3.8–10.5)
WBC # FLD AUTO: 10.15 K/UL — SIGNIFICANT CHANGE UP (ref 3.8–10.5)

## 2018-07-13 RX ORDER — ASPIRIN/CALCIUM CARB/MAGNESIUM 324 MG
81 TABLET ORAL DAILY
Qty: 0 | Refills: 0 | Status: DISCONTINUED | OUTPATIENT
Start: 2018-07-13 | End: 2018-08-09

## 2018-07-13 RX ADMIN — HEPARIN SODIUM 5000 UNIT(S): 5000 INJECTION INTRAVENOUS; SUBCUTANEOUS at 17:45

## 2018-07-13 RX ADMIN — Medication 0.5 MILLIGRAM(S): at 20:51

## 2018-07-13 RX ADMIN — Medication 500 MILLIGRAM(S): at 13:22

## 2018-07-13 RX ADMIN — Medication 100 MILLIGRAM(S): at 15:22

## 2018-07-13 RX ADMIN — SIMVASTATIN 10 MILLIGRAM(S): 20 TABLET, FILM COATED ORAL at 21:59

## 2018-07-13 RX ADMIN — ISOSORBIDE MONONITRATE 120 MILLIGRAM(S): 60 TABLET, EXTENDED RELEASE ORAL at 13:20

## 2018-07-13 RX ADMIN — Medication 100 MILLIGRAM(S): at 21:59

## 2018-07-13 RX ADMIN — Medication 500 MILLIGRAM(S): at 17:45

## 2018-07-13 RX ADMIN — Medication 325 MILLIGRAM(S): at 13:21

## 2018-07-13 RX ADMIN — Medication 25 MILLIGRAM(S): at 05:55

## 2018-07-13 RX ADMIN — Medication 81 MILLIGRAM(S): at 13:21

## 2018-07-13 RX ADMIN — HEPARIN SODIUM 5000 UNIT(S): 5000 INJECTION INTRAVENOUS; SUBCUTANEOUS at 05:55

## 2018-07-13 RX ADMIN — Medication 25 MILLIGRAM(S): at 17:46

## 2018-07-13 RX ADMIN — Medication 100 MILLIGRAM(S): at 06:03

## 2018-07-13 RX ADMIN — Medication 100 MILLIGRAM(S): at 13:21

## 2018-07-13 RX ADMIN — Medication 0.5 MILLIGRAM(S): at 08:50

## 2018-07-13 RX ADMIN — FINASTERIDE 5 MILLIGRAM(S): 5 TABLET, FILM COATED ORAL at 13:20

## 2018-07-13 RX ADMIN — Medication 500 MILLIGRAM(S): at 05:55

## 2018-07-13 RX ADMIN — Medication 8 MILLIGRAM(S): at 21:59

## 2018-07-13 RX ADMIN — Medication 500 MILLIGRAM(S): at 23:02

## 2018-07-13 NOTE — PROGRESS NOTE ADULT - ASSESSMENT
1) Clostridium Difficile Colitis  2) Metabolic Acidosis  3) Restrictive Ventilatory Disease  4) SHAYY  5) Bilateral Pleural Effusions  6) Renal failure on dilaysis now  7) Leukocytosis is clinically improved    84 y/o M w/pmhx of Afib, CHF, CAD s/p stents, COPD, PNA, upper GI bleed (duodenal)  BIB EMS from [facility] assisted living for fever, abd pain, and diarrhea that began 3 weeks ago. Was in the hospital for ESBL and was later dx with C-diff and started on a course of PO vancomycin for 10 days for the C-diff. At the present time has diffuse abdominal pain, being treated Vancomycin and IV Metronidazole  ABG reviewed and reveals 7.18/39/68, LA normal limits  Etiology likely from sepsis   repeat ABG noted  ABG - ( 09 Jul 2018 11:50 )  pH, Arterial: 7.21  pH, Blood: x     /  pCO2: 42    /  pO2: 67    / HCO3: 16    / Base Excess: -10.6 /  SaO2: 91      repeat cxr noted  At the present time, patient states he would like to be a full code  Continue monitoring hemodynamics (daughter states baseline diastolic tends to run between 25-40mmHg)   Monitor urine output aggressively   Used BIPAP/CPAP in the past (stopped as an outpatient after patient lost weight), may worsen intraabdominal pressures but if arrhythmias are present, may consider starting again.   Appreciate nephrology/cardiology/ID/GI/hospitalist recommendations  Will continue to monitor   hx of resp failure and intubation 2012 x 3-4 days  feels ok with breathing now  being evaluated for surgical interventions with pancolitis  possibility of post op need for vent support discussed with pt and his dtr  he wants to proceed understanding high risk for surgery due to compromised medical condition  no need for thoracentesis    s/p Shiley and hemodialysis  still with abd distention but NGT is now discontinued  family wants to wait on abd surgery given he is high risk  overall prognosis remains guarded  improving clinically with decreased leukocytosis  hx SHAYY and treated with BIPAP in the past / stopped using it after wt loss  Thank you for the consult

## 2018-07-13 NOTE — PROGRESS NOTE ADULT - SUBJECTIVE AND OBJECTIVE BOX
NEPHROLOGY INTERVAL HPI/OVERNIGHT EVENTS:      The patient was dialyzed 3 days in a row this week  Discussed with the patient's daughter and hospitalist, intensivist  Will skip dialysis today, no urgent need for dialysis  We'll dialyze the patient tomorrow on Saturday and then skip  to dialyze the patient again on Monday.     SY  Feeling fairly ok today.  Surgery follow up appreciated.  To start clear liquid diet.  Denies SOB.     SY  Tolerated first HD fairly ok.  Alert and responsive.  Denies SOB.  Denies Abdominal pain today.    7/10  Incontinent pt , documented anuric  Straight cath x 1,  brown urine 200 ml  family at bedside  on ivf + bicarb @ 125 ml/hr    7/10 addendum  R IJ placed   family at bedside   dialysis to be initiated  pt comfortable  CXR no PTX    HPI:  84 y/o wm with hx of ckd stage 3 ( scr about 1.6-1.7) presents from Assisted living with fever, abd pain and diarrhea.  Had been dx with cdiff 3 weeks ago and placed on PO vanc at that time.  Since admission, Ct reveals pan colitis + cdiff and on increasing dose of po vanc.  Noted with rising scr despite ivf and renal consult requested.      Pt states that he feels better today.  MOre alert and awake.  diarrhea has decreased and his abd pain improved.  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 3 milliLiter(s) Nebulizer once  allopurinol 100 milliGRAM(s) Oral daily  buDESOnide   0.5 milliGRAM(s) Respule 0.5 milliGRAM(s) Inhalation two times a day  diltiazem    Tablet 30 milliGRAM(s) Oral every 6 hours  doxazosin 8 milliGRAM(s) Oral at bedtime  ferrous    sulfate 325 milliGRAM(s) Oral daily  finasteride 5 milliGRAM(s) Oral daily  heparin  Injectable 5000 Unit(s) SubCutaneous every 12 hours  isosorbide   mononitrate ER Tablet (IMDUR) 120 milliGRAM(s) Oral daily  metoprolol tartrate 25 milliGRAM(s) Oral two times a day  metroNIDAZOLE  IVPB      metroNIDAZOLE  IVPB 500 milliGRAM(s) IV Intermittent every 8 hours  simvastatin 10 milliGRAM(s) Oral at bedtime  vancomycin    Solution 500 milliGRAM(s) Oral every 6 hours    MEDICATIONS  (PRN):  acetaminophen   Tablet 650 milliGRAM(s) Oral every 8 hours PRN For Temp greater than 38 C (100.4 F)  acetaminophen   Tablet. 650 milliGRAM(s) Oral every 8 hours PRN Mild Pain (1 - 3)  albumin human 25% IVPB 50 milliLiter(s) IV Intermittent <User Schedule> PRN for SBP</=120  ALBUTerol   0.5% 2.5 milliGRAM(s) Nebulizer every 4 hours PRN Shortness of Breath and/or Wheezing  HYDROmorphone  Injectable 0.5 milliGRAM(s) IV Push every 6 hours PRN Moderate Pain (4 - 6)  ondansetron Injectable 4 milliGRAM(s) IV Push every 6 hours PRN Nausea and/or Vomiting          Vital Signs Last 24 Hrs  T(C): 36.4 (2018 06:00), Max: 37.3 (2018 22:00)  T(F): 97.5 (2018 06:00), Max: 99.2 (2018 22:00)  HR: 64 (2018 08:00) (60 - 120)  BP: 127/41 (2018 08:00) (96/72 - 167/38)  BP(mean): 63 (2018 08:00) (50 - 95)  RR: 20 (2018 08:00) (11 - 21)  SpO2: 96% (2018 08:00) (87% - 99%)  Daily     Daily Weight in k.4 (2018 11:58)    - @ 07:01  -  - @ 07:00  --------------------------------------------------------  IN: 300 mL / OUT: 450 mL / NET: -150 mL        PHYSICAL EXAM:  Alert and appropriate  GENERAL: No distress  CHEST/LUNG: Fair air entry   HEART: S1S2 RRR  ABDOMEN: distended.  EXTREMITIES: 3+ pitting edema in LE ext and abdominal wall.  SKIN:     LABS:                        8.3    10.62 )-----------( 202      ( 2018 05:17 )             26.8         141  |  107  |  33<H>  ----------------------------<  66<L>  3.9   |  23  |  2.84<H>    Ca    7.4<L>      2018 05:17  Phos  4.3       Mg     2.0         TPro  x   /  Alb  2.0<L>  /  TBili  x   /  DBili  x   /  AST  x   /  ALT  x   /  AlkPhos  x       PT/INR - ( 10 Jul 2018 11:56 )   PT: 11.7 sec;   INR: 1.08 ratio         PTT - ( 10 Jul 2018 11:56 )  PTT:31.5 sec    Phosphorus Level, Serum: 4.3 mg/dL ( @ 05:17)  Magnesium, Serum: 2.0 mg/dL ( @ 05:17)    ABG - ( 2018 05:18 )  pH, Arterial: 7.31  pH, Blood: x     /  pCO2: 47    /  pO2: 78    / HCO3: 23    / Base Excess: -2.8  /  SaO2: 94                    RADIOLOGY & ADDITIONAL TESTS:

## 2018-07-13 NOTE — PROGRESS NOTE ADULT - SUBJECTIVE AND OBJECTIVE BOX
Patient is a 83y old  Male who presents with a chief complaint of complain of diarrhea, fever (06 Jul 2018 23:55)      HPI:  Pt is a 84 y/o M w/pmhx of Afib, CHF, CAD s/p stents, COPD, PNA, upper GI bleed (duodenal)  BIB EMS from [facility] assisted living for fever, abd pain, and diarrhea that began 3 weeks ago. Was in the hospital for PNA tx and was later dx with C-diff and started on a course of PO vancomycin for 10 days for the C-diff. States that he has had diarrhea since before the course of abx. Pain has been increased for the past few weeks and is characterized as a cramping feeling. Daughters also note that there is increased distension. Also notes chest pain characterized as a cramping in the central chest. 83% O2 sat noted this morning at the assisted living facility. Takes O2 routinely at night but not during the day. (06 Jul 2018 23:55)  Patient's daughter states he was treated for ESBL with antibiotics prior to this  7/10  seen and examined with his dter at bedside  hx of resp failure and intubation 2012 x 3-4 days  feels ok with breathing now  being evaluated for surgical interventions with pancolitis  possibility of post op need for vent support discussed witrh pt and his dtr  he wants to proceed understanding high risk for surgery due to compromised medical condition  hx SHAYY and treated with BIPAP in the past / stopped using it after wt loss  7/11  seen in ICU with dtr at bedside  data discussed with critical care RN  s/p Ross and hemodialysis  still with abd distention  family wants to wait on abd surgery given he is high risk  7/12  family at bedside updated  no cp  no difficulty breathing  intermittent wheezing  7/13  NGT is discontinued  dtr at bedside updated  no issue with breathing now  PAST MEDICAL & SURGICAL HISTORY:  Diastolic CHF  Peripheral vascular disease  Afib  Anemia  CKD (chronic kidney disease)  COPD (chronic obstructive pulmonary disease)  SHAYY (obstructive sleep apnea)  Sepsis, due to unspecified organism: 2/2 poorly healing wounds b/l  Dyspepsia: On moderate exertion.  Sleep apnea, obstructive: Requires home 02 therapy, and treatment with BIPAP  Atelectasis  Pleural effusion, bilateral  Respiratory failure  Peripheral edema  CRI (chronic renal insufficiency)  Gout  Benign prostatic hypertrophy  Spinal stenosis  Hypercholesterolemia  GERD (gastroesophageal reflux disease)  CAD (coronary artery disease)  Hypertension  S/P angioplasty with stent  Cataract of left eye  Prostate: Surgery green light procedure.  S/P rotator cuff surgery: Right  S/P angioplasty    MEDICATIONS  (STANDING):  ALBUTerol/ipratropium for Nebulization. 3 milliLiter(s) Nebulizer once  allopurinol 100 milliGRAM(s) Oral daily  buDESOnide   0.5 milliGRAM(s) Respule 0.5 milliGRAM(s) Inhalation two times a day  diltiazem    Tablet 30 milliGRAM(s) Oral every 6 hours  doxazosin 8 milliGRAM(s) Oral at bedtime  ferrous    sulfate 325 milliGRAM(s) Oral daily  finasteride 5 milliGRAM(s) Oral daily  heparin  Injectable 5000 Unit(s) SubCutaneous every 12 hours  isosorbide   mononitrate ER Tablet (IMDUR) 120 milliGRAM(s) Oral daily  metoprolol tartrate 25 milliGRAM(s) Oral two times a day  metroNIDAZOLE  IVPB      metroNIDAZOLE  IVPB 500 milliGRAM(s) IV Intermittent every 8 hours  simvastatin 10 milliGRAM(s) Oral at bedtime  vancomycin    Solution 500 milliGRAM(s) Oral every 6 hours            MEDICATIONS  (PRN):  acetaminophen   Tablet 650 milliGRAM(s) Oral every 8 hours PRN For Temp greater than 38 C (100.4 F)  acetaminophen   Tablet. 650 milliGRAM(s) Oral every 8 hours PRN Mild Pain (1 - 3)  ALBUTerol   0.5% 2.5 milliGRAM(s) Nebulizer every 4 hours PRN Shortness of Breath and/or Wheezing  morphine  - Injectable 2 milliGRAM(s) IV Push every 6 hours PRN Severe Pain (7 - 10)  ondansetron Injectable 4 milliGRAM(s) IV Push every 6 hours PRN Nausea and/or Vomiting      FAMILY HISTORY:  No pertinent family history in first degree relatives    REVIEW OF SYSTEM:  abdominal pain, otherwise 12 point ROS negative     Vital Signs Last 24 Hrs  T(C): 37.6 (13 Jul 2018 06:00), Max: 37.7 (12 Jul 2018 21:00)  T(F): 99.6 (13 Jul 2018 06:00), Max: 99.9 (12 Jul 2018 21:00)  HR: 72 (13 Jul 2018 06:00) (51 - 81)  BP: 129/103 (13 Jul 2018 06:00) (110/37 - 156/38)  BP(mean): 109 (13 Jul 2018 06:00) (51 - 109)  RR: 21 (13 Jul 2018 06:00) (13 - 22)  SpO2: 93% (13 Jul 2018 06:00) (90% - 99%)    PHYSICAL EXAM  General Appearance: cooperative, no acute distress,   HEENT: PERRL, conjunctiva clear, EOM's intact, non injected pharynx, no exudate, TM   normal  Neck: Supple, , no adenopathy, thyroid: not enlarged, no carotid bruit or JVD  Back: Symmetric, no  tenderness,no soft tissue tenderness  Lungs: Diminished bilaterally R>L with some dullness to percussion  Heart: Regular rate and rhythm, S1, S2 normal, no murmur, rub or gallop  Abdomen:  decreased-tender,no active bowel sounds, Distended , no hepatosplenomegaly  Extremities: pos peripheral edema / Hx Right leg amputation  Skin: Skin color, texture normal, no rashes   Neurologic: Alert and oriented X3 , cranial nerves intact, sensory and motor normal,    ECG:    LABS:                          8.2    10.15 )-----------( 183      ( 13 Jul 2018 05:14 )             26.1   07-13    143  |  107  |  26<H>  ----------------------------<  89  3.5   |  26  |  2.62<H>    Ca    7.2<L>      13 Jul 2018 05:14  Phos  4.3     07-12  Mg     2.0     07-12    TPro  x   /  Alb  2.0<L>  /  TBili  x   /  DBili  x   /  AST  x   /  ALT  x   /  AlkPhos  x   07-12                            8.3    10.62 )-----------( 202      ( 12 Jul 2018 05:17 )             26.8   07-12    141  |  107  |  33<H>  ----------------------------<  66<L>  3.9   |  23  |  2.84<H>    Ca    7.4<L>      12 Jul 2018 05:17  Phos  4.3     07-12  Mg     2.0     07-12    TPro  x   /  Alb  2.0<L>  /  TBili  x   /  DBili  x   /  AST  x   /  ALT  x   /  AlkPhos  x   07-12                          8.8    20.36 )-----------( 265      ( 10 Jul 2018 05:47 )             28.0   07-11    142  |  108  |  52<H>  ----------------------------<  80  4.0   |  23  |  3.82<H>    Ca    7.0<L>      11 Jul 2018 06:20  Phos  4.9     07-11  Mg     1.9     07-11                              8.1    20.78 )-----------( 239      ( 09 Jul 2018 05:48 )             25.6     07-09    144  |  114<H>  |  65<H>  ----------------------------<  113<H>  4.5   |  17<L>  |  3.90<H>    Ca    7.1<L>      09 Jul 2018 05:48  Mg     1.9     07-09      CARDIAC MARKERS ( 07 Jul 2018 17:51 )  0.024 ng/mL / x     / x     / x     / x                  ABG - ( 08 Jul 2018 15:47 )  pH, Arterial: 7.18  pH, Blood: x     /  pCO2: 39    /  pO2: 68    / HCO3: 14    / Base Excess: -13.1 /  SaO2: 92            < from: Xray Chest 1 View-PORTABLE IMMEDIATE (07.10.18 @ 15:50) >  INTERPRETATION:  CLINICAL INDICATION:  R  I J dialysis line placement    TECHNIQUE: Single view AP chest radiograph was performed.    COMPARISON:  Chest radiograph performed earlier on the same day,   7/10/2018 at 8:28 AM and chest radiograph dated 7/6/2018.    FINDINGS:    Interval placement of right-sided jugular central venous catheter with   tip projecting over the right atrium. No evidence forpneumothorax.    There is persistent mild pulmonary vascular congestion. There is trace   fluid within the right minor fissure.    The cardiac silhouette size is stable. Diffuse aortic calcifications are   noted    Redemonstration of anchor screw within the right humeral head.     IMPRESSION:    Interval placement of right-sided IJ CVC, with tip projecting over the   right atrium. No pneumothorax.     < end of copied text >          RADIOLOGY & ADDITIONAL STUDIES:  IMPRESSION:     Severe pancolitis consistent with pseudomembranous colitis.    Mild pulmonary edema.    Small loculated right and trace layering left pleural effusions.

## 2018-07-13 NOTE — PROGRESS NOTE ADULT - ASSESSMENT
Pseudomembranous colitis status post recent antibiotics    By mouth vancomycin high-dose and IV Flagyl   case discussed with  family  improving    Improved white count, acidemia although the acidosis may be secondary to renal failure.  improved and encouraging  Ileus appears to be resolving. Agree with  clear liquid diet.    Patient had a recent course of C. difficile that was treated with vancomycin with a recurrence and he's had C. difficile in the past.  Due to severe C. difficile, would strongly consider a prolonged taper of vancomycin.  For now, would complete two-week course of vancomycin and then would taper her vancomycin over a long time.  Discussed with family.      A fibrillation with rapid ventricular rhythm, status post Cardizem drip.    Would hold anticoagulation secondary to history bleeding and possible surgery.    COPD obesity, obstructive sleep apnea, coronary artery disease, overall comorbid risk factors that would increase his risk of surgery.    IVF per renal

## 2018-07-13 NOTE — PROGRESS NOTE ADULT - SUBJECTIVE AND OBJECTIVE BOX
Events Overnight    only had small amount of clear liquids yesterday, did have bm, c/o less abdominal pain, had dialysis yesterdazy  HPI:  Pt is a 84 y/o M w/pmhx of Afib, CHF, CAD s/p stents, COPD, PNA, upper GI bleed (duodenal)  BIB EMS from [facility] assisted living for fever, abd pain, and diarrhea that began 3 weeks ago. Was in the hospital for PNA tx and was later dx with C-diff and started on a course of PO vancomycin for 10 days for the C-diff. saw admiited with c. dif, developed renal failure, on dialysis           PAST MEDICAL & SURGICAL HISTORY:  Diastolic CHF  Peripheral vascular disease  Afib  Anemia  CKD (chronic kidney disease)  COPD (chronic obstructive pulmonary disease)  SHAYY (obstructive sleep apnea)  Sepsis, due to unspecified organism: 2/2 poorly healing wounds b/l  Dyspepsia: On moderate exertion.  Sleep apnea, obstructive: Requires home 02 therapy, and treatment with BIPAP  Atelectasis  Pleural effusion, bilateral  Respiratory failure  Peripheral edema  CRI (chronic renal insufficiency)  Gout  Benign prostatic hypertrophy  Spinal stenosis  Hypercholesterolemia  GERD (gastroesophageal reflux disease)  CAD (coronary artery disease)  Hypertension  S/P angioplasty with stent  Cataract of left eye  Prostate: Surgery green light procedure.  S/P rotator cuff surgery: Right  S/P angioplasty       MEDICATIONS  (STANDING):  ALBUTerol/ipratropium for Nebulization. 3 milliLiter(s) Nebulizer once  allopurinol 100 milliGRAM(s) Oral daily  buDESOnide   0.5 milliGRAM(s) Respule 0.5 milliGRAM(s) Inhalation two times a day  diltiazem    Tablet 30 milliGRAM(s) Oral every 6 hours  doxazosin 8 milliGRAM(s) Oral at bedtime  ferrous    sulfate 325 milliGRAM(s) Oral daily  finasteride 5 milliGRAM(s) Oral daily  heparin  Injectable 5000 Unit(s) SubCutaneous every 12 hours  isosorbide   mononitrate ER Tablet (IMDUR) 120 milliGRAM(s) Oral daily  metoprolol tartrate 25 milliGRAM(s) Oral two times a day  metroNIDAZOLE  IVPB      metroNIDAZOLE  IVPB 500 milliGRAM(s) IV Intermittent every 8 hours  simvastatin 10 milliGRAM(s) Oral at bedtime  vancomycin    Solution 500 milliGRAM(s) Oral every 6 hours    MEDICATIONS  (PRN):  acetaminophen   Tablet 650 milliGRAM(s) Oral every 8 hours PRN For Temp greater than 38 C (100.4 F)  acetaminophen   Tablet. 650 milliGRAM(s) Oral every 8 hours PRN Mild Pain (1 - 3)  albumin human 25% IVPB 50 milliLiter(s) IV Intermittent <User Schedule> PRN for SBP</=120  albumin human 25% IVPB 50 milliLiter(s) IV Intermittent every 1 hour PRN for SBP,120  ALBUTerol   0.5% 2.5 milliGRAM(s) Nebulizer every 4 hours PRN Shortness of Breath and/or Wheezing  HYDROmorphone  Injectable 0.5 milliGRAM(s) IV Push every 6 hours PRN Moderate Pain (4 - 6)  ondansetron Injectable 4 milliGRAM(s) IV Push every 6 hours PRN Nausea and/or Vomiting      Neuro: awale a;ert. appropriate  General - weak  neck has right IJ shiley  lungs dec bs at bases  cv rrr  abdomen - distended, tympatnetic, less tender, had bm overight  ext s/p bka on right  back some sacral redness    CV: Vital Signs Last 24 Hrs  T(C): 37.6 (13 Jul 2018 06:00), Max: 37.7 (12 Jul 2018 21:00)  T(F): 99.6 (13 Jul 2018 06:00), Max: 99.9 (12 Jul 2018 21:00)  HR: 72 (13 Jul 2018 06:00) (51 - 81)  BP: 129/103 (13 Jul 2018 06:00) (110/37 - 156/38)  BP(mean): 109 (13 Jul 2018 06:00) (51 - 109)  RR: 21 (13 Jul 2018 06:00) (13 - 22)  SpO2: 93% (13 Jul 2018 06:00) (90% - 99%)              I&O's Summary    12 Jul 2018 07:01  -  13 Jul 2018 07:00  --------------------------------------------------------  IN: 400 mL / OUT: 0 mL / NET: 400 mL      07-13    143  |  107  |  26<H>  ----------------------------<  89  3.5   |  26  |  2.62<H>    Ca    7.2<L>      13 Jul 2018 05:14  Phos  4.3     07-12  Mg     2.0     07-12    TPro  x   /  Alb  2.0<L>  /  TBili  x   /  DBili  x   /  AST  x   /  ALT  x   /  AlkPhos  x   07-12                             8.2    10.15 )-----------( 183      ( 13 Jul 2018 05:14 )             26.1     ID temp 99 onpo vanco, flagyl      DVT Prophylaxis: Heparin sq, no calf tenderness    Advanced Directives: Full code

## 2018-07-13 NOTE — PROGRESS NOTE ADULT - SUBJECTIVE AND OBJECTIVE BOX
no n/v  did not take much clear liquids yesterday  diarrhea improved    Vital Signs Last 24 Hrs  T(C): 37.6 (13 Jul 2018 06:00), Max: 37.7 (12 Jul 2018 21:00)  T(F): 99.6 (13 Jul 2018 06:00), Max: 99.9 (12 Jul 2018 21:00)  HR: 72 (13 Jul 2018 06:00) (51 - 81)  BP: 129/103 (13 Jul 2018 06:00) (110/37 - 156/38)  BP(mean): 109 (13 Jul 2018 06:00) (51 - 109)  RR: 21 (13 Jul 2018 06:00) (14 - 22)  SpO2: 93% (13 Jul 2018 06:00) (90% - 99%)    NAD  abd softly distended                          8.2    10.15 )-----------( 183      ( 13 Jul 2018 05:14 )             26.1   07-13    143  |  107  |  26<H>  ----------------------------<  89  3.5   |  26  |  2.62<H>    Ca    7.2<L>      13 Jul 2018 05:14  Phos  4.3     07-12  Mg     2.0     07-12    TPro  x   /  Alb  2.0<L>  /  TBili  x   /  DBili  x   /  AST  x   /  ALT  x   /  AlkPhos  x   07-12

## 2018-07-13 NOTE — PROGRESS NOTE ADULT - SUBJECTIVE AND OBJECTIVE BOX
Patient is a 83y old  Male who presents with a chief complaint of complain of diarrhea, fever (06 Jul 2018 23:55)      HPI:  Pt is a 84 y/o M w/pmhx of Afib, CHF, CAD s/p stents, COPD, PNA, upper GI bleed (duodenal)  BIB EMS from [facility] assisted living for fever, abd pain, and diarrhea that began 3 weeks ago. Was in the hospital for PNA tx and was later dx with C-diff and started on a course of PO vancomycin for 10 days for the C-diff. States that he has had diarrhea since before the course of abx. Pain has been increased for the past few weeks and is characterized as a cramping feeling. Daughters also note that there is increased distension. Also notes chest pain characterized as a cramping in the central chest. 83% O2 sat noted this morning at the assisted living facility. Takes O2 routinely at night but not during the day. (06 Jul 2018 23:55)    Patient more comfortable. He did not eat much yesterday and had some abdominal pain after clear liquids. However, he is more hungry. Had a couple of bowel movements.  Was dialyzed.  Negative fevers. Feels abdominal pain is improving, crampy, diffuse, mild without radiation.  History per patient and daughter  PAST MEDICAL & SURGICAL HISTORY:  Diastolic CHF  Peripheral vascular disease  Afib  Anemia  CKD (chronic kidney disease)  COPD (chronic obstructive pulmonary disease)  SHAYY (obstructive sleep apnea)  Sepsis, due to unspecified organism: 2/2 poorly healing wounds b/l  Dyspepsia: On moderate exertion.  Sleep apnea, obstructive: Requires home 02 therapy, and treatment with BIPAP  Atelectasis  Pleural effusion, bilateral  Respiratory failure  Peripheral edema  CRI (chronic renal insufficiency)  Gout  Benign prostatic hypertrophy  Spinal stenosis  Hypercholesterolemia  GERD (gastroesophageal reflux disease)  CAD (coronary artery disease)  Hypertension  S/P angioplasty with stent  Cataract of left eye  Prostate: Surgery green light procedure.  S/P rotator cuff surgery: Right  S/P angioplasty      MEDICATIONS  (STANDING):  ALBUTerol/ipratropium for Nebulization. 3 milliLiter(s) Nebulizer once  allopurinol 100 milliGRAM(s) Oral daily  buDESOnide   0.5 milliGRAM(s) Respule 0.5 milliGRAM(s) Inhalation two times a day  diltiazem    Tablet 30 milliGRAM(s) Oral every 6 hours  doxazosin 8 milliGRAM(s) Oral at bedtime  ferrous    sulfate 325 milliGRAM(s) Oral daily  finasteride 5 milliGRAM(s) Oral daily  heparin  Injectable 5000 Unit(s) SubCutaneous every 12 hours  isosorbide   mononitrate ER Tablet (IMDUR) 120 milliGRAM(s) Oral daily  metoprolol tartrate 25 milliGRAM(s) Oral two times a day  metroNIDAZOLE  IVPB      metroNIDAZOLE  IVPB 500 milliGRAM(s) IV Intermittent every 8 hours  simvastatin 10 milliGRAM(s) Oral at bedtime  vancomycin    Solution 500 milliGRAM(s) Oral every 6 hours    MEDICATIONS  (PRN):  acetaminophen   Tablet 650 milliGRAM(s) Oral every 8 hours PRN For Temp greater than 38 C (100.4 F)  acetaminophen   Tablet. 650 milliGRAM(s) Oral every 8 hours PRN Mild Pain (1 - 3)  albumin human 25% IVPB 50 milliLiter(s) IV Intermittent <User Schedule> PRN for SBP</=120  ALBUTerol   0.5% 2.5 milliGRAM(s) Nebulizer every 4 hours PRN Shortness of Breath and/or Wheezing  HYDROmorphone  Injectable 0.5 milliGRAM(s) IV Push every 6 hours PRN Moderate Pain (4 - 6)  ondansetron Injectable 4 milliGRAM(s) IV Push every 6 hours PRN Nausea and/or Vomiting      Allergies    Zosyn (Rash)    Intolerances        SOCIAL HISTORY:NC    FAMILY HISTORY:  No pertinent family history in first degree relatives      REVIEW OF SYSTEMS:    CONSTITUTIONAL: No weakness, fevers or chills  EYES/ENT: No visual changes;  No vertigo or throat pain   NECK: No pain or stiffness  RESPIRATORY: No cough, wheezing, hemoptysis; No shortness of breath  CARDIOVASCULAR: No chest pain or palpitations  GENITOURINARY: No dysuria, frequency or hematuria  NEUROLOGICAL: No numbness or weakness  SKIN: No itching, burning, rashes, or lesions   All other review of systems is negative unless indicated above.    Vital Signs Last 24 Hrs  T(C): 36.3 (12 Jul 2018 10:00), Max: 37.3 (11 Jul 2018 22:00)  T(F): 97.4 (12 Jul 2018 10:00), Max: 99.2 (11 Jul 2018 22:00)  HR: 59 (12 Jul 2018 10:00) (58 - 120)  BP: 124/34 (12 Jul 2018 10:00) (96/72 - 167/38)  BP(mean): 58 (12 Jul 2018 10:00) (50 - 95)  RR: 14 (12 Jul 2018 10:00) (11 - 21)  SpO2: 96% (12 Jul 2018 10:00) (87% - 99%)    PHYSICAL EXAM:    Constitutional: NAD, well-developed  HEENT: EOMI, throat clear  Neck: No LAD, supple  Respiratory: CTA and P  Cardiovascular: S1 and S2, RRR, no M  Gastrointestinal: BS+, soft, mild RLQ tend and dec distension, neg HSM,  \  Neurological: A/O x 3, no focal deficits  Psychiatric: Normal mood, normal affect  Skin: No rashes    LABS:  CBC Full  -  ( 12 Jul 2018 05:17 )  WBC Count : 10.62 K/uL  Hemoglobin : 8.3 g/dL  Hematocrit : 26.8 %  Platelet Count - Automated : 202 K/uL  Mean Cell Volume : 90.8 fl  Mean Cell Hemoglobin : 28.1 pg  Mean Cell Hemoglobin Concentration : 31.0 gm/dL  Auto Neutrophil # : x  Auto Lymphocyte # : x  Auto Monocyte # : x  Auto Eosinophil # : x  Auto Basophil # : x  Auto Neutrophil % : x  Auto Lymphocyte % : x  Auto Monocyte % : x  Auto Eosinophil % : x  Auto Basophil % : x    07-12    141  |  107  |  33<H>  ----------------------------<  66<L>  3.9   |  23  |  2.84<H>    Ca    7.4<L>      12 Jul 2018 05:17  Phos  4.3     07-12  Mg     2.0     07-12    TPro  x   /  Alb  2.0<L>  /  TBili  x   /  DBili  x   /  AST  x   /  ALT  x   /  AlkPhos  x   07-12    PT/INR - ( 10 Jul 2018 11:56 )   PT: 11.7 sec;   INR: 1.08 ratio         PTT - ( 10 Jul 2018 11:56 )  PTT:31.5 sec    07-10 @ 16:30  Hep A Igm Nonreact  Hep A total ab, IgA and M --  Hep B core Ab total --  Hep B core IgM Nonreact  Hep B DNA PCR --  Hep BSAg --  Hep BSAb --  Hep BSAb --  HCV Ab --  HCV RNA Log --  HCV RNA interp --                RADIOLOGY & ADDITIONAL STUDIES:  < from: Xray Abdomen 2 Views (07.11.18 @ 09:10) >  EXAM:  XR ABDOMEN 2V                            PROCEDURE DATE:  07/11/2018          INTERPRETATION:  Abdomen radiographs             CLINICAL INFORMATION:  Distention  Pain.    TECHNIQUE:  Supine and upright views of the abdomen were obtained.    FINDINGS:   7/10/2018 x-rays available for review.    The bowel gas pattern is significant for several loops of small bowel in   the central abdomen that show mild wall thickening suggesting enteritis.   Nasogastric tube tip in stomach.  No pathologiccalcifications are seen.    The osseous structures appear intact.           IMPRESSION:  several loops of small bowel in the central abdomen that   show mild wall thickening suggesting enteritis. Nasogastric tube tip in   stomach             < end of copied text >

## 2018-07-13 NOTE — PROGRESS NOTE ADULT - ASSESSMENT
84 y/o wm with hx of ckd stage 3 ( scr 1.5-1.7) with ARYA due to volume depletion from CDiff associated colitis and diarrhea in presence of diuretic use PTA.  Now with non anion gap metabolic acidosis and worsening renal parameters.  CXR with mild CHF with hx of diastolic CHF.    PLAN  - obtain abd and lactate  - will change ivf and add bicarbonate and keep at current rate  - hold lasix done.   - fu uop and scr closely and will monitor respiratory status  - bp and hr stable now, cardizem adjusted and lopressor added    7/9 MK  - ARYA: worsening renal parameters with metabolic acidosis, non ag.  Previous lactate WNL and since placed on bicarbonate drip  fu repeat abg today.  Increase IVF rate  possibility of HD discussed with enio, hcp daughter, pt has been agreeable in past.   DC hydralazine  dc morphine and change to dilaudid  - Cdiff with rising scr and worsening abd distension: CRS consult ongoing  possible repeat ct scan  DW Dr ballesteros    7/10  Anuric  anasarca  Non anion gap acidosis on bicarb drip  ARYA, anuric  CKD 3 history  d/w Family, and pt agreeable  called ICU for line placement and to dialyze the pt in ICU for monitoring    7/10  HD initiated via R temp HD catheter  tolerating well  no complaints  good abf    7/11 SY  --ARYA with Anasarca.  Urine output slightly improved.  However, remains quite fluid overloaded.  Will plan to continue HD until fluid status is much improved.  HD order written.  3 hour tx today.  Will aim to remove 2.5 kg as tolerated.  --C DIff colitis ; continue to monitor closely.    7/12 SY  --ARYA with Anasarca.   Remains Oligo-anuric.    --HD with goal of 2.5 kg UF again today.  --C Diff colitis.   Clinically improved   Continue to monitor.    7/13  The patient was dialyzed 3 days in a row this week  Discussed with the patient's daughter and hospitalist, intensivist  Will skip dialysis today, no urgent need for dialysis  We'll dialyze the patient tomorrow on Saturday and then skip Sunday to dialyze the patient again on Monday.

## 2018-07-13 NOTE — PROGRESS NOTE ADULT - ASSESSMENT
84 y/o M w/pmhx of Afib, CHF, CAD s/p stents, COPD, PNA, upper GI bleed (duodenal)  BIB EMS from [facility] assisted living for fever, abd pain, and diarrhea that began 3 weeks ago. Was in the hospital for PNA tx and was later dx with C-diff and started on a course of PO vancomycin for 10 days for C-diff. Cardiology called for abnormal tele findings, abnormal EKG.     1. Mobitz II- EP consult appreciated. No recurrent events. He will need reevaluation for sleep apnea as outpt. Bradycardia thought to be vagally induced.    2. NSVT, bigeminy- keep K>4, Mag>2. Cont CCB for now. Cont to follow on tele. Echo with normal LV fxn, mod MR.    3. Renal  insufficiency - improving. Today Cr is 2.62.     4. Abdominal pain - Management pre primary team.     5. Atrial fibrillation- off anticoagulation secondary to severe GI bleed in past requiring 15 units of pRBCs.    6. Cont ICU monitoring today. DVT proph.

## 2018-07-13 NOTE — PROGRESS NOTE ADULT - SUBJECTIVE AND OBJECTIVE BOX
Cardiology Progress Note  Patient is a 83y old  Male who presents with a chief complaint of complain of diarrhea, fever.     HPI: Pt is a 82 y/o M w/pmhx of Afib, CHF, CAD s/p stents, COPD, PNA, upper GI bleed (duodenal)  BIB EMS from [facility] assisted living for fever, abd pain, and diarrhea that began 3 weeks ago. Was in the hospital for PNA tx and was later dx with C-diff and started on a course of PO vancomycin for 10 days for the C-diff. States that he has had diarrhea since before the course of abx. Pain has been increased for the past few weeks and is characterized as a cramping feeling. Daughters also note that there is increased distension.     7/10- pt seen and examined today am.  Daughter at bedside.     7/11- pt seen and examined by me today.  He denies any symptoms. Much alert today. NG tube in place./  Daughter at bedside.    PAST MEDICAL & SURGICAL HISTORY:  Diastolic CHF  Peripheral vascular disease  Afib  Anemia  CKD (chronic kidney disease)  COPD (chronic obstructive pulmonary disease)  SHAYY (obstructive sleep apnea)  Sepsis, due to unspecified organism: 2/2 poorly healing wounds b/l  Dyspepsia: On moderate exertion.  Sleep apnea, obstructive: Requires home 02 therapy, and treatment with BIPAP  Atelectasis  Pleural effusion, bilateral  Respiratory failure  Peripheral edema  CRI (chronic renal insufficiency)  Gout  Benign prostatic hypertrophy  Spinal stenosis  Hypercholesterolemia  GERD (gastroesophageal reflux disease)  CAD (coronary artery disease)  Hypertension  S/P angioplasty with stent  Cataract of left eye  Prostate: Surgery green light procedure.  S/P rotator cuff surgery: Right  S/P angioplasty      MEDICATIONS  (STANDING):  allopurinol 100 milliGRAM(s) Oral daily  buDESOnide   0.5 milliGRAM(s) Respule 0.5 milliGRAM(s) Inhalation two times a day  dextrose 5% 1000 milliLiter(s) (75 mL/Hr) IV Continuous <Continuous>  diltiazem    Tablet 30 milliGRAM(s) Oral every 6 hours  doxazosin 8 milliGRAM(s) Oral at bedtime  ferrous    sulfate 325 milliGRAM(s) Oral daily  finasteride 5 milliGRAM(s) Oral daily  hydrALAZINE 50 milliGRAM(s) Oral two times a day  isosorbide   mononitrate ER Tablet (IMDUR) 120 milliGRAM(s) Oral daily  metoprolol tartrate 25 milliGRAM(s) Oral two times a day  metroNIDAZOLE  IVPB      metroNIDAZOLE  IVPB 500 milliGRAM(s) IV Intermittent every 8 hours  simvastatin 10 milliGRAM(s) Oral at bedtime  vancomycin    Solution 500 milliGRAM(s) Oral every 6 hours    MEDICATIONS  (PRN):  acetaminophen   Tablet 650 milliGRAM(s) Oral every 8 hours PRN For Temp greater than 38 C (100.4 F)  acetaminophen   Tablet. 650 milliGRAM(s) Oral every 8 hours PRN Mild Pain (1 - 3)  ALBUTerol   0.5% 2.5 milliGRAM(s) Nebulizer every 4 hours PRN Shortness of Breath and/or Wheezing  morphine  - Injectable 2 milliGRAM(s) IV Push every 6 hours PRN Severe Pain (7 - 10)  ondansetron Injectable 4 milliGRAM(s) IV Push every 6 hours PRN Nausea and/or Vomiting    FAMILY HISTORY: No pertinent family history in first degree relatives    SOCIAL HISTORY: as above.    REVIEW OF SYSTEMS:  CONSTITUTIONAL:    No fatigue, malaise, lethargy.  No fever or chills.  HEENT:  Eyes:  No visual changes.     ENT:  No epistaxis.  No sinus pain.    RESPIRATORY:  No cough.  No wheeze.  No hemoptysis.  No shortness of breath.  CARDIOVASCULAR:  No chest pains.  No palpitations. No shortness of breath, No orthopnea or PND.  GASTROINTESTINAL:  no abdominal pain.  No nausea or vomiting.    GENITOURINARY:    No hematuria.    MUSCULOSKELETAL:  No musculoskeletal pain.  No joint swelling.  No arthritis.  NEUROLOGICAL:  No tingling or numbness or weakness.  PSYCHIATRIC:  No confusion    Vital Signs Last 24 Hrs  T(C): 36.4 (09 Jul 2018 04:00), Max: 36.7 (08 Jul 2018 11:40)  T(F): 97.6 (09 Jul 2018 04:00), Max: 98.1 (08 Jul 2018 11:40)  HR: 66 (09 Jul 2018 07:40) (66 - 80)  BP: 115/23 (09 Jul 2018 04:00) (109/28 - 137/33)  BP(mean): --  RR: 16 (09 Jul 2018 04:00) (16 - 18)  SpO2: 93% (09 Jul 2018 04:00) (91% - 95%)    PHYSICAL EXAM-    Constitutional: ill looking elderly male in no acute distress.     Head: Head is normocephalic and atraumatic.      Neck: NG tube in place    Cardiovascular: Regular rate and rhythm without S3, S4. No murmurs or rubs are appreciated.      Respiratory: Breath sounds are normal. No rales. No wheezing.    Abdomen: Soft, nontender, distended with positive bowel sounds.      Extremity: No tenderness. No  pitting edema     Neurologic: The patient is alert and oriented.      INTERPRETATION OF TELEMETRY: sinus rythm, NSVT, bigeminy    ECG:    I&O's Detail      LABS:                        8.1    20.78 )-----------( 239      ( 09 Jul 2018 05:48 )             25.6     07-09    144  |  114<H>  |  65<H>  ----------------------------<  113<H>  4.5   |  17<L>  |  3.90<H>    Ca    7.1<L>      09 Jul 2018 05:48  Mg     1.9     07-09    CARDIAC MARKERS ( 07 Jul 2018 17:51 )  0.024 ng/mL / x     / x     / x     / x        I&O's Summary    BNP  RADIOLOGY & ADDITIONAL STUDIES:    < from: Transthoracic Echocardiogram (07.10.18 @ 10:21) >     Fibrocalcific changes noted to the mitral valve leaflets with preserved   leaflet excursion.   Moderate (2+) mitral regurgitation is present.   The left atrium is moderately dilated.   Mild concentric left ventricular hypertrophy is present.   Estimated left ventricular ejection fraction is 55-60 %.    < end of copied text >

## 2018-07-14 LAB
ANION GAP SERPL CALC-SCNC: 10 MMOL/L — SIGNIFICANT CHANGE UP (ref 5–17)
BUN SERPL-MCNC: 33 MG/DL — HIGH (ref 7–23)
CALCIUM SERPL-MCNC: 7.1 MG/DL — LOW (ref 8.5–10.1)
CHLORIDE SERPL-SCNC: 106 MMOL/L — SIGNIFICANT CHANGE UP (ref 96–108)
CO2 SERPL-SCNC: 26 MMOL/L — SIGNIFICANT CHANGE UP (ref 22–31)
CREAT SERPL-MCNC: 3.55 MG/DL — HIGH (ref 0.5–1.3)
GLUCOSE SERPL-MCNC: 114 MG/DL — HIGH (ref 70–99)
HCT VFR BLD CALC: 27.8 % — LOW (ref 39–50)
HGB BLD-MCNC: 8.7 G/DL — LOW (ref 13–17)
MCHC RBC-ENTMCNC: 27.7 PG — SIGNIFICANT CHANGE UP (ref 27–34)
MCHC RBC-ENTMCNC: 31.3 GM/DL — LOW (ref 32–36)
MCV RBC AUTO: 88.5 FL — SIGNIFICANT CHANGE UP (ref 80–100)
NRBC # BLD: 0 /100 WBCS — SIGNIFICANT CHANGE UP (ref 0–0)
PLATELET # BLD AUTO: 178 K/UL — SIGNIFICANT CHANGE UP (ref 150–400)
POTASSIUM SERPL-MCNC: 3.4 MMOL/L — LOW (ref 3.5–5.3)
POTASSIUM SERPL-SCNC: 3.4 MMOL/L — LOW (ref 3.5–5.3)
RBC # BLD: 3.14 M/UL — LOW (ref 4.2–5.8)
RBC # FLD: 19.6 % — HIGH (ref 10.3–14.5)
SODIUM SERPL-SCNC: 142 MMOL/L — SIGNIFICANT CHANGE UP (ref 135–145)
WBC # BLD: 10.8 K/UL — HIGH (ref 3.8–10.5)
WBC # FLD AUTO: 10.8 K/UL — HIGH (ref 3.8–10.5)

## 2018-07-14 RX ORDER — DIPHENHYDRAMINE HCL 50 MG
25 CAPSULE ORAL AT BEDTIME
Qty: 0 | Refills: 0 | Status: DISCONTINUED | OUTPATIENT
Start: 2018-07-14 | End: 2018-08-09

## 2018-07-14 RX ADMIN — Medication 25 MILLIGRAM(S): at 06:44

## 2018-07-14 RX ADMIN — Medication 325 MILLIGRAM(S): at 15:25

## 2018-07-14 RX ADMIN — FINASTERIDE 5 MILLIGRAM(S): 5 TABLET, FILM COATED ORAL at 15:24

## 2018-07-14 RX ADMIN — Medication 500 MILLIGRAM(S): at 18:45

## 2018-07-14 RX ADMIN — Medication 0.5 MILLIGRAM(S): at 09:09

## 2018-07-14 RX ADMIN — Medication 100 MILLIGRAM(S): at 21:36

## 2018-07-14 RX ADMIN — Medication 100 MILLIGRAM(S): at 15:26

## 2018-07-14 RX ADMIN — ISOSORBIDE MONONITRATE 120 MILLIGRAM(S): 60 TABLET, EXTENDED RELEASE ORAL at 15:27

## 2018-07-14 RX ADMIN — Medication 500 MILLIGRAM(S): at 15:27

## 2018-07-14 RX ADMIN — HEPARIN SODIUM 5000 UNIT(S): 5000 INJECTION INTRAVENOUS; SUBCUTANEOUS at 06:44

## 2018-07-14 RX ADMIN — Medication 100 MILLIGRAM(S): at 15:25

## 2018-07-14 RX ADMIN — Medication 500 MILLIGRAM(S): at 06:44

## 2018-07-14 RX ADMIN — Medication 25 MILLIGRAM(S): at 18:45

## 2018-07-14 RX ADMIN — Medication 8 MILLIGRAM(S): at 21:36

## 2018-07-14 RX ADMIN — Medication 50 MILLILITER(S): at 11:51

## 2018-07-14 RX ADMIN — Medication 25 MILLIGRAM(S): at 23:55

## 2018-07-14 RX ADMIN — HEPARIN SODIUM 5000 UNIT(S): 5000 INJECTION INTRAVENOUS; SUBCUTANEOUS at 18:45

## 2018-07-14 RX ADMIN — SIMVASTATIN 10 MILLIGRAM(S): 20 TABLET, FILM COATED ORAL at 21:36

## 2018-07-14 RX ADMIN — Medication 81 MILLIGRAM(S): at 15:25

## 2018-07-14 RX ADMIN — Medication 50 MILLILITER(S): at 13:33

## 2018-07-14 RX ADMIN — Medication 0.5 MILLIGRAM(S): at 20:24

## 2018-07-14 RX ADMIN — Medication 500 MILLIGRAM(S): at 23:55

## 2018-07-14 RX ADMIN — Medication 100 MILLIGRAM(S): at 06:44

## 2018-07-14 NOTE — PROGRESS NOTE ADULT - CONSTITUTIONAL
Detail Level: Detailed
Size Of Lesion: 3x2cm
detailed exam

## 2018-07-14 NOTE — PROGRESS NOTE ADULT - SUBJECTIVE AND OBJECTIVE BOX
Denies abdominal pain. Tolerating diet so far. No nausea or emesis. Endorses distension.     Vital Signs Last 24 Hrs  T(C): 36.6 (14 Jul 2018 06:00), Max: 37.3 (13 Jul 2018 22:00)  T(F): 97.8 (14 Jul 2018 06:00), Max: 99.2 (13 Jul 2018 22:00)  HR: 65 (14 Jul 2018 09:16) (60 - 76)  BP: 131/41 (14 Jul 2018 06:00) (107/46 - 144/33)  BP(mean): 58 (14 Jul 2018 06:00) (54 - 72)  RR: 18 (14 Jul 2018 06:00) (16 - 22)  SpO2: 97% (14 Jul 2018 06:00) (94% - 99%)    General: in no distress  Abdomen: soft, distended, mild lower abdominal tenderness without rebound or guarding                          8.7    10.80 )-----------( 178      ( 14 Jul 2018 05:14 )             27.8   07-14    142  |  106  |  33<H>  ----------------------------<  114<H>  3.4<L>   |  26  |  3.55<H>    Ca    7.1<L>      14 Jul 2018 05:14    MEDICATIONS  (STANDING):  ALBUTerol/ipratropium for Nebulization. 3 milliLiter(s) Nebulizer once  allopurinol 100 milliGRAM(s) Oral daily  aspirin  chewable 81 milliGRAM(s) Oral daily  buDESOnide   0.5 milliGRAM(s) Respule 0.5 milliGRAM(s) Inhalation two times a day  diltiazem    Tablet 30 milliGRAM(s) Oral every 6 hours  doxazosin 8 milliGRAM(s) Oral at bedtime  ferrous    sulfate 325 milliGRAM(s) Oral daily  finasteride 5 milliGRAM(s) Oral daily  heparin  Injectable 5000 Unit(s) SubCutaneous every 12 hours  isosorbide   mononitrate ER Tablet (IMDUR) 120 milliGRAM(s) Oral daily  metoprolol tartrate 25 milliGRAM(s) Oral two times a day  metroNIDAZOLE  IVPB      metroNIDAZOLE  IVPB 500 milliGRAM(s) IV Intermittent every 8 hours  simvastatin 10 milliGRAM(s) Oral at bedtime  vancomycin    Solution 500 milliGRAM(s) Oral every 6 hours

## 2018-07-14 NOTE — PROGRESS NOTE ADULT - SUBJECTIVE AND OBJECTIVE BOX
Events Overnight: having some diarrhea, feels a little better, creatinine inc from 2.6 to 3.2, yesterday    HPI:     Pt is a 84 y/o M w/pmhx of Afib, CHF, CAD s/p stents, COPD, PNA, upper GI bleed (duodenal)  BIB EMS from [facility] assisted living for fever, abd pain, and diarrhea that began 3 weeks ago. Was in the hospital for PNA tx and was later dx with C-diff and started on a course of PO vancomycin for 10 days for the C-diff. saw admiited with c. dif, developed renal failure, on dialysis  on po vanco and iv flagyl, wbc better no fever, distention a little better    PMH as above         MEDICATIONS  (STANDING):  ALBUTerol/ipratropium for Nebulization. 3 milliLiter(s) Nebulizer once  allopurinol 100 milliGRAM(s) Oral daily  aspirin  chewable 81 milliGRAM(s) Oral daily  buDESOnide   0.5 milliGRAM(s) Respule 0.5 milliGRAM(s) Inhalation two times a day  diltiazem    Tablet 30 milliGRAM(s) Oral every 6 hours  doxazosin 8 milliGRAM(s) Oral at bedtime  ferrous    sulfate 325 milliGRAM(s) Oral daily  finasteride 5 milliGRAM(s) Oral daily  heparin  Injectable 5000 Unit(s) SubCutaneous every 12 hours  isosorbide   mononitrate ER Tablet (IMDUR) 120 milliGRAM(s) Oral daily  metoprolol tartrate 25 milliGRAM(s) Oral two times a day  metroNIDAZOLE  IVPB      metroNIDAZOLE  IVPB 500 milliGRAM(s) IV Intermittent every 8 hours  simvastatin 10 milliGRAM(s) Oral at bedtime  vancomycin    Solution 500 milliGRAM(s) Oral every 6 hours    MEDICATIONS  (PRN):  acetaminophen   Tablet 650 milliGRAM(s) Oral every 8 hours PRN For Temp greater than 38 C (100.4 F)  acetaminophen   Tablet. 650 milliGRAM(s) Oral every 8 hours PRN Mild Pain (1 - 3)  albumin human 25% IVPB 50 milliLiter(s) IV Intermittent <User Schedule> PRN for SBP</=120  albumin human 25% IVPB 50 milliLiter(s) IV Intermittent every 1 hour PRN for SBP,120  ALBUTerol   0.5% 2.5 milliGRAM(s) Nebulizer every 4 hours PRN Shortness of Breath and/or Wheezing  HYDROmorphone  Injectable 0.5 milliGRAM(s) IV Push every 6 hours PRN Moderate Pain (4 - 6)  ondansetron Injectable 4 milliGRAM(s) IV Push every 6 hours PRN Nausea and/or Vomiting          ICU Vital Signs Last 24 Hrs  T(C): 36.6 (14 Jul 2018 06:00), Max: 37.3 (13 Jul 2018 22:00)  T(F): 97.8 (14 Jul 2018 06:00), Max: 99.2 (13 Jul 2018 22:00)  HR: 71 (14 Jul 2018 06:00) (51 - 76)  BP: 131/41 (14 Jul 2018 06:00) (88/32 - 144/33)  BP(mean): 58 (14 Jul 2018 06:00) (46 - 72)  ABP: --  ABP(mean): --  RR: 18 (14 Jul 2018 06:00) (14 - 23)  SpO2: 97% (14 Jul 2018 06:00) (94% - 99%)          I&O's Summary    13 Jul 2018 07:01  -  14 Jul 2018 07:00  --------------------------------------------------------  IN: 100 mL / OUT: 0 mL / NET: 100 mL                             8.7    10.80 )-----------( 178      ( 14 Jul 2018 05:14 )             27.8       07-14    142  |  106  |  33<H>  ----------------------------<  114<H>  3.4<L>   |  26  |  3.55<H>    Ca    7.1<L>      14 Jul 2018 05:14      DVT Prophylaxis:     Heparin subq                                                            Advanced Directives: Full code

## 2018-07-14 NOTE — PROGRESS NOTE ADULT - SUBJECTIVE AND OBJECTIVE BOX
Patient is a 83y old  Male who presents with a chief complaint of complain of diarrhea, fever (06 Jul 2018 23:55)      Subective:  Tolerating limited POs. On HD now.    PAST MEDICAL & SURGICAL HISTORY:  Diastolic CHF  Peripheral vascular disease  Afib  Anemia  CKD (chronic kidney disease)  COPD (chronic obstructive pulmonary disease)  SHAYY (obstructive sleep apnea)  Sepsis, due to unspecified organism: 2/2 poorly healing wounds b/l  Dyspepsia: On moderate exertion.  Sleep apnea, obstructive: Requires home 02 therapy, and treatment with BIPAP  Atelectasis  Pleural effusion, bilateral  Respiratory failure  Peripheral edema  CRI (chronic renal insufficiency)  Gout  Benign prostatic hypertrophy  Spinal stenosis  Hypercholesterolemia  GERD (gastroesophageal reflux disease)  CAD (coronary artery disease)  Hypertension  S/P angioplasty with stent  Cataract of left eye  Prostate: Surgery green light procedure.  S/P rotator cuff surgery: Right  S/P angioplasty      MEDICATIONS  (STANDING):  ALBUTerol/ipratropium for Nebulization. 3 milliLiter(s) Nebulizer once  allopurinol 100 milliGRAM(s) Oral daily  aspirin  chewable 81 milliGRAM(s) Oral daily  buDESOnide   0.5 milliGRAM(s) Respule 0.5 milliGRAM(s) Inhalation two times a day  diltiazem    Tablet 30 milliGRAM(s) Oral every 6 hours  doxazosin 8 milliGRAM(s) Oral at bedtime  ferrous    sulfate 325 milliGRAM(s) Oral daily  finasteride 5 milliGRAM(s) Oral daily  heparin  Injectable 5000 Unit(s) SubCutaneous every 12 hours  isosorbide   mononitrate ER Tablet (IMDUR) 120 milliGRAM(s) Oral daily  metoprolol tartrate 25 milliGRAM(s) Oral two times a day  metroNIDAZOLE  IVPB      metroNIDAZOLE  IVPB 500 milliGRAM(s) IV Intermittent every 8 hours  simvastatin 10 milliGRAM(s) Oral at bedtime  vancomycin    Solution 500 milliGRAM(s) Oral every 6 hours    MEDICATIONS  (PRN):  acetaminophen   Tablet 650 milliGRAM(s) Oral every 8 hours PRN For Temp greater than 38 C (100.4 F)  acetaminophen   Tablet. 650 milliGRAM(s) Oral every 8 hours PRN Mild Pain (1 - 3)  albumin human 25% IVPB 50 milliLiter(s) IV Intermittent <User Schedule> PRN for SBP</=120  albumin human 25% IVPB 50 milliLiter(s) IV Intermittent every 1 hour PRN for SBP,120  ALBUTerol   0.5% 2.5 milliGRAM(s) Nebulizer every 4 hours PRN Shortness of Breath and/or Wheezing  HYDROmorphone  Injectable 0.5 milliGRAM(s) IV Push every 6 hours PRN Moderate Pain (4 - 6)  ondansetron Injectable 4 milliGRAM(s) IV Push every 6 hours PRN Nausea and/or Vomiting      REVIEW OF SYSTEMS:    RESPIRATORY: No shortness of breath  CARDIOVASCULAR: No chest pain  All other review of systems is negative unless indicated above.    Vital Signs Last 24 Hrs  T(C): 36.6 (14 Jul 2018 11:23), Max: 37.3 (13 Jul 2018 22:00)  T(F): 97.8 (14 Jul 2018 11:23), Max: 99.2 (13 Jul 2018 22:00)  HR: 62 (14 Jul 2018 11:23) (60 - 76)  BP: 104/35 (14 Jul 2018 11:23) (104/35 - 144/33)  BP(mean): 56 (14 Jul 2018 11:00) (54 - 73)  RR: 19 (14 Jul 2018 11:23) (16 - 22)  SpO2: 92% (14 Jul 2018 11:23) (92% - 99%)    PHYSICAL EXAM:    Constitutional: NAD, chronically ill appearing  Respiratory: CTAB  Cardiovascular: S1 and S2, RRR  Gastrointestinal: BS+, soft, mildly distended  Extremities: No peripheral edema  Psychiatric: Normal mood, normal affect    LABS:                        8.7    10.80 )-----------( 178      ( 14 Jul 2018 05:14 )             27.8     07-14    142  |  106  |  33<H>  ----------------------------<  114<H>  3.4<L>   |  26  |  3.55<H>    Ca    7.1<L>      14 Jul 2018 05:14            RADIOLOGY & ADDITIONAL STUDIES:

## 2018-07-14 NOTE — PROGRESS NOTE ADULT - ASSESSMENT
83 year old male with severe C diff colitis which is gradually improving. Abdomen still quite distended but tenderness has improved and he is tolerating diet.     Cont Vancomycin and flagyl.  Remainder of cares per primary team

## 2018-07-14 NOTE — PROGRESS NOTE ADULT - RESPIRATORY
Breath Sounds equal & clear to percussion & auscultation, no accessory muscle use
detailed exam
Breath Sounds equal & clear to percussion & auscultation, no accessory muscle use
detailed exam
Breath Sounds equal & clear to percussion & auscultation, no accessory muscle use
detailed exam

## 2018-07-14 NOTE — PROGRESS NOTE ADULT - ASSESSMENT
1. Patient with c . dif,  2. developed acute renal failure    continue po vanco, flagyl  advance diet, patient states he is hungry  bp ok  abdomen is less distended  dialysis today   may transfer to floor

## 2018-07-14 NOTE — PROGRESS NOTE ADULT - ASSESSMENT
82 y/o wm with hx of ckd stage 3 ( scr 1.5-1.7) with ARYA due to volume depletion from CDiff associated colitis and diarrhea in presence of diuretic use PTA.  Now with non anion gap metabolic acidosis and worsening renal parameters.  CXR with mild CHF with hx of diastolic CHF.    PLAN  - obtain abd and lactate  - will change ivf and add bicarbonate and keep at current rate  - hold lasix done.   - fu uop and scr closely and will monitor respiratory status  - bp and hr stable now, cardizem adjusted and lopressor added    7/9 MK  - ARYA: worsening renal parameters with metabolic acidosis, non ag.  Previous lactate WNL and since placed on bicarbonate drip  fu repeat abg today.  Increase IVF rate  possibility of HD discussed with enio, hcp daughter, pt has been agreeable in past.   DC hydralazine  dc morphine and change to dilaudid  - Cdiff with rising scr and worsening abd distension: CRS consult ongoing  possible repeat ct scan  DW Dr ballesteros    7/10  Anuric  anasarca  Non anion gap acidosis on bicarb drip  ARYA, anuric  CKD 3 history  d/w Family, and pt agreeable  called ICU for line placement and to dialyze the pt in ICU for monitoring    7/10  HD initiated via R temp HD catheter  tolerating well  no complaints  good abf    7/11 SY  --ARYA with Anasarca.  Urine output slightly improved.  However, remains quite fluid overloaded.  Will plan to continue HD until fluid status is much improved.  HD order written.  3 hour tx today.  Will aim to remove 2.5 kg as tolerated.  --C DIff colitis ; continue to monitor closely.    7/12 SY  --ARYA with Anasarca.   Remains Oligo-anuric.    --HD with goal of 2.5 kg UF again today.  --C Diff colitis.   Clinically improved   Continue to monitor.    7/13  The patient was dialyzed 3 days in a row this week  Discussed with the patient's daughter and hospitalist, intensivist  Will skip dialysis today, no urgent need for dialysis  We'll dialyze the patient tomorrow on Saturday and then skip Sunday to dialyze the patient again on Monday.    7/14  We'll dialyze against 4 K bath  Case discussed with the patient's daughter  May need a permanent catheter by Monday

## 2018-07-14 NOTE — PROGRESS NOTE ADULT - GASTROINTESTINAL COMMENTS
had bm , still feels bloated
diffuse abd pain
abdominal pain when someone touches abdomin
distended, slight right sided discomfort
distended , but less distended,

## 2018-07-14 NOTE — PROGRESS NOTE ADULT - ASSESSMENT
1) Clostridium Difficile Colitis  2) Metabolic Acidosis  3) Restrictive Ventilatory Disease  4) SHAYY  5) Bilateral Pleural Effusions  6) Renal failure on dilaysis now  7) Leukocytosis is clinically improved    84 y/o M w/pmhx of Afib, CHF, CAD s/p stents, COPD, PNA, upper GI bleed (duodenal)  BIB EMS from [facility] assisted living for fever, abd pain, and diarrhea that began 3 weeks ago. Was in the hospital for ESBL and was later dx with C-diff and started on a course of PO vancomycin for 10 days for the C-diff. At the present time has diffuse abdominal pain, being treated Vancomycin and IV Metronidazole  ABG reviewed and reveals 7.18/39/68, LA normal limits  Etiology likely from sepsis   repeat ABG noted  ABG - ( 09 Jul 2018 11:50 )  pH, Arterial: 7.21  pH, Blood: x     /  pCO2: 42    /  pO2: 67    / HCO3: 16    / Base Excess: -10.6 /  SaO2: 91      repeat cxr noted  At the present time, patient states he would like to be a full code  Continue monitoring hemodynamics (daughter states baseline diastolic tends to run between 25-40mmHg)   Monitor urine output aggressively   Used BIPAP/CPAP in the past (stopped as an outpatient after patient lost weight), may worsen intraabdominal pressures but if arrhythmias are present, may consider starting again.   Appreciate nephrology/cardiology/ID/GI/hospitalist recommendations  Will continue to monitor   hx of resp failure and intubation 2012 x 3-4 days  feels ok with breathing now  being evaluated for surgical interventions with pancolitis  possibility of post op need for vent support discussed with pt and his dtr  he wants to proceed understanding high risk for surgery due to compromised medical condition  no need for thoracentesis    s/p Shiley and hemodialysis  still with abd distention but NGT is now discontinued and tolerated po yesterday  overall prognosis remains guarded  improving clinically with decreased leukocytosis  tolerating PO intake, monitor clinically  hx SHAYY and treated with BIPAP in the past / stopped using it after wt loss  Thank you for the consult

## 2018-07-15 RX ADMIN — Medication 500 MILLIGRAM(S): at 17:52

## 2018-07-15 RX ADMIN — SIMVASTATIN 10 MILLIGRAM(S): 20 TABLET, FILM COATED ORAL at 21:54

## 2018-07-15 RX ADMIN — Medication 325 MILLIGRAM(S): at 12:31

## 2018-07-15 RX ADMIN — Medication 100 MILLIGRAM(S): at 21:54

## 2018-07-15 RX ADMIN — Medication 100 MILLIGRAM(S): at 06:32

## 2018-07-15 RX ADMIN — Medication 100 MILLIGRAM(S): at 12:31

## 2018-07-15 RX ADMIN — ISOSORBIDE MONONITRATE 120 MILLIGRAM(S): 60 TABLET, EXTENDED RELEASE ORAL at 12:31

## 2018-07-15 RX ADMIN — Medication 100 MILLIGRAM(S): at 13:23

## 2018-07-15 RX ADMIN — Medication 500 MILLIGRAM(S): at 12:31

## 2018-07-15 RX ADMIN — Medication 25 MILLIGRAM(S): at 17:51

## 2018-07-15 RX ADMIN — Medication 25 MILLIGRAM(S): at 23:12

## 2018-07-15 RX ADMIN — Medication 8 MILLIGRAM(S): at 21:54

## 2018-07-15 RX ADMIN — Medication 0.5 MILLIGRAM(S): at 09:03

## 2018-07-15 RX ADMIN — HEPARIN SODIUM 5000 UNIT(S): 5000 INJECTION INTRAVENOUS; SUBCUTANEOUS at 06:32

## 2018-07-15 RX ADMIN — Medication 0.5 MILLIGRAM(S): at 20:17

## 2018-07-15 RX ADMIN — FINASTERIDE 5 MILLIGRAM(S): 5 TABLET, FILM COATED ORAL at 12:31

## 2018-07-15 RX ADMIN — Medication 81 MILLIGRAM(S): at 12:31

## 2018-07-15 RX ADMIN — Medication 500 MILLIGRAM(S): at 06:33

## 2018-07-15 RX ADMIN — Medication 500 MILLIGRAM(S): at 23:12

## 2018-07-15 RX ADMIN — HEPARIN SODIUM 5000 UNIT(S): 5000 INJECTION INTRAVENOUS; SUBCUTANEOUS at 17:52

## 2018-07-15 NOTE — PROGRESS NOTE ADULT - ASSESSMENT
82 y/o wm with hx of ckd stage 3 ( scr 1.5-1.7) with ARYA due to volume depletion from CDiff associated colitis and diarrhea in presence of diuretic use PTA.  Now with non anion gap metabolic acidosis and worsening renal parameters.  CXR with mild CHF with hx of diastolic CHF.    PLAN  - obtain abd and lactate  - will change ivf and add bicarbonate and keep at current rate  - hold lasix done.   - fu uop and scr closely and will monitor respiratory status  - bp and hr stable now, cardizem adjusted and lopressor added    7/9 MK  - ARYA: worsening renal parameters with metabolic acidosis, non ag.  Previous lactate WNL and since placed on bicarbonate drip  fu repeat abg today.  Increase IVF rate  possibility of HD discussed with enio, hcp daughter, pt has been agreeable in past.   DC hydralazine  dc morphine and change to dilaudid  - Cdiff with rising scr and worsening abd distension: CRS consult ongoing  possible repeat ct scan  DW Dr ballesteros    7/10  Anuric  anasarca  Non anion gap acidosis on bicarb drip  ARYA, anuric  CKD 3 history  d/w Family, and pt agreeable  called ICU for line placement and to dialyze the pt in ICU for monitoring    7/10  HD initiated via R temp HD catheter  tolerating well  no complaints  good abf    7/11 SY  --ARYA with Anasarca.  Urine output slightly improved.  However, remains quite fluid overloaded.  Will plan to continue HD until fluid status is much improved.  HD order written.  3 hour tx today.  Will aim to remove 2.5 kg as tolerated.  --C DIff colitis ; continue to monitor closely.    7/12 SY  --ARYA with Anasarca.   Remains Oligo-anuric.    --HD with goal of 2.5 kg UF again today.  --C Diff colitis.   Clinically improved   Continue to monitor.    7/13  The patient was dialyzed 3 days in a row this week  Discussed with the patient's daughter and hospitalist, intensivist  Will skip dialysis today, no urgent need for dialysis  We'll dialyze the patient tomorrow on Saturday and then skip Sunday to dialyze the patient again on Monday.    7/14  We'll dialyze against 4 K bath  Case discussed with the patient's daughter  May need a permanent catheter by Monday      7/15  Dialyze with a 4K bath  Potassium is 3.3 most likely from the diarrhea  Schedule for permacath placement after dialysis on Monday or Tuesday  Patient is comfortable no complications noted at this time

## 2018-07-15 NOTE — CHART NOTE - NSCHARTNOTEFT_GEN_A_CORE
Brief Nutrition Note:    Pt's diet advanced to DASH/TLC diet. As per daughter pt consumed for breakfast today- 3 bites of scrambled eggs, 1/2 corn muffin, 8 grapes, and 8oz Hot chocolate. Pt reports po/appetite slowly improving. Received HD yesterday, no HD today. Possible placement of catheter for Dialysis for ARF. Renal Labs- 7/15-K+ 3.3, BUN 23, Cr. 3.04, 7/12/18- Phosphorus 4.3. Pt continues with loose stools 2/2 to CDIFF, rectal tube placed. Pt c/o right lower abdominal pain.     Recommendations:  1) Liberalize diet to regular (2/2 to poor nutritional status and intake)  2) continue to monitor hydration status and encourage po fluids throughout the day to meet needs.   3) monitor labs   4) Prosource 1oz po TID mixed with apple juice for additional Protein (45gm).

## 2018-07-15 NOTE — PROGRESS NOTE ADULT - ASSESSMENT
83 year old male with severe C diff colitis which is gradually improving. Abdomen still quite distended.    Cont Vancomycin and flagyl.  Remainder of cares per primary team

## 2018-07-15 NOTE — PROGRESS NOTE ADULT - ASSESSMENT
1) Clostridium Difficile Colitis  2) Metabolic Acidosis  3) Restrictive Ventilatory Disease  4) SHAYY  5) Bilateral Pleural Effusions  6) Renal failure on dilaysis now  7) Leukocytosis is clinically improved    84 y/o M w/pmhx of Afib, CHF, CAD s/p stents, COPD, PNA, upper GI bleed (duodenal)  BIB EMS from [facility] assisted living for fever, abd pain, and diarrhea that began 3 weeks ago. Was in the hospital for ESBL and was later dx with C-diff and started on a course of PO vancomycin for 10 days for the C-diff. At the present time has diffuse abdominal pain, being treated Vancomycin and IV Metronidazole  ABG reviewed and reveals 7.18/39/68, LA normal limits  Etiology likely from sepsis   repeat ABG noted  ABG - ( 09 Jul 2018 11:50 )  pH, Arterial: 7.21  pH, Blood: x     /  pCO2: 42    /  pO2: 67    / HCO3: 16    / Base Excess: -10.6 /  SaO2: 91      repeat cxr noted  At the present time, patient states he would like to be a full code  Continue monitoring hemodynamics (daughter states baseline diastolic tends to run between 25-40mmHg)   Monitor urine output aggressively   Used BIPAP/CPAP in the past (stopped as an outpatient after patient lost weight), may worsen intraabdominal pressures but if arrhythmias are present, may consider starting again.   Appreciate nephrology/cardiology/ID/GI/hospitalist recommendations  Will continue to monitor   hx of resp failure and intubation 2012 x 3-4 days  feels ok with breathing now  being evaluated for surgical interventions with pancolitis  possibility of post op need for vent support discussed with pt and his dtr  he wants to proceed understanding high risk for surgery due to compromised medical condition  no need for thoracentesis    s/p Shiley and hemodialysis  still with abd distention but NGT is now discontinued and tolerated po yesterday  overall prognosis remains guarded  improving clinically with decreased leukocytosis  tolerating PO intake, monitor clinically  may need permannt cath placement tomorrow for dialysis  pulm status appears optimized for low risk procedure  fu cxr afterwards  hx SHAYY and treated with BIPAP in the past / stopped using it after wt loss  Thank you for the consult

## 2018-07-15 NOTE — PROGRESS NOTE ADULT - SUBJECTIVE AND OBJECTIVE BOX
Patient is a 83y old  Male who presents with a chief complaint of complain of diarrhea, fever (06 Jul 2018 23:55)      HPI:  Pt is a 82 y/o M w/pmhx of Afib, CHF, CAD s/p stents, COPD, PNA, upper GI bleed (duodenal)  BIB EMS from [facility] assisted living for fever, abd pain, and diarrhea that began 3 weeks ago. Was in the hospital for PNA tx and was later dx with C-diff and started on a course of PO vancomycin for 10 days for the C-diff. States that he has had diarrhea since before the course of abx. Pain has been increased for the past few weeks and is characterized as a cramping feeling. Daughters also note that there is increased distension. Also notes chest pain characterized as a cramping in the central chest. 83% O2 sat noted this morning at the assisted living facility. Takes O2 routinely at night but not during the day. (06 Jul 2018 23:55)  Patient's daughter states he was treated for ESBL with antibiotics prior to this  7/10  seen and examined with his dter at bedside  hx of resp failure and intubation 2012 x 3-4 days  feels ok with breathing now  being evaluated for surgical interventions with pancolitis  possibility of post op need for vent support discussed witrh pt and his dtr  he wants to proceed understanding high risk for surgery due to compromised medical condition  hx SHAYY and treated with BIPAP in the past / stopped using it after wt loss  7/11  seen in ICU with dtr at bedside  data discussed with critical care RN  s/p Ross and hemodialysis  still with abd distention  family wants to wait on abd surgery given he is high risk  7/12  family at bedside updated  no cp  no difficulty breathing  intermittent wheezing  7/13  NGT is discontinued  dtr at bedside updated  no issue with breathing now  7/14  having HD  some po intake tolerated ok  dtr at bedside updated  no active pulm issues  7/15  resting comfortably  no distress  Gi and renal eval noted  PAST MEDICAL & SURGICAL HISTORY:  Diastolic CHF  Peripheral vascular disease  Afib  Anemia  CKD (chronic kidney disease)  COPD (chronic obstructive pulmonary disease)  SHAYY (obstructive sleep apnea)  Sepsis, due to unspecified organism: 2/2 poorly healing wounds b/l  Dyspepsia: On moderate exertion.  Sleep apnea, obstructive: Requires home 02 therapy, and treatment with BIPAP  Atelectasis  Pleural effusion, bilateral  Respiratory failure  Peripheral edema  CRI (chronic renal insufficiency)  Gout  Benign prostatic hypertrophy  Spinal stenosis  Hypercholesterolemia  GERD (gastroesophageal reflux disease)  CAD (coronary artery disease)  Hypertension  S/P angioplasty with stent  Cataract of left eye  Prostate: Surgery green light procedure.  S/P rotator cuff surgery: Right  S/P angioplasty    MEDICATIONS  (STANDING):  ALBUTerol/ipratropium for Nebulization. 3 milliLiter(s) Nebulizer once  allopurinol 100 milliGRAM(s) Oral daily  buDESOnide   0.5 milliGRAM(s) Respule 0.5 milliGRAM(s) Inhalation two times a day  diltiazem    Tablet 30 milliGRAM(s) Oral every 6 hours  doxazosin 8 milliGRAM(s) Oral at bedtime  ferrous    sulfate 325 milliGRAM(s) Oral daily  finasteride 5 milliGRAM(s) Oral daily  heparin  Injectable 5000 Unit(s) SubCutaneous every 12 hours  isosorbide   mononitrate ER Tablet (IMDUR) 120 milliGRAM(s) Oral daily  metoprolol tartrate 25 milliGRAM(s) Oral two times a day  metroNIDAZOLE  IVPB      metroNIDAZOLE  IVPB 500 milliGRAM(s) IV Intermittent every 8 hours  simvastatin 10 milliGRAM(s) Oral at bedtime  vancomycin    Solution 500 milliGRAM(s) Oral every 6 hours            MEDICATIONS  (PRN):  acetaminophen   Tablet 650 milliGRAM(s) Oral every 8 hours PRN For Temp greater than 38 C (100.4 F)  acetaminophen   Tablet. 650 milliGRAM(s) Oral every 8 hours PRN Mild Pain (1 - 3)  ALBUTerol   0.5% 2.5 milliGRAM(s) Nebulizer every 4 hours PRN Shortness of Breath and/or Wheezing  morphine  - Injectable 2 milliGRAM(s) IV Push every 6 hours PRN Severe Pain (7 - 10)  ondansetron Injectable 4 milliGRAM(s) IV Push every 6 hours PRN Nausea and/or Vomiting      FAMILY HISTORY:  No pertinent family history in first degree relatives    REVIEW OF SYSTEM:  abdominal pain, otherwise 12 point ROS negative     Vital Signs Last 24 Hrs  T(C): 36.1 (15 Jul 2018 01:00), Max: 37.4 (14 Jul 2018 17:00)  T(F): 97 (15 Jul 2018 01:00), Max: 99.3 (14 Jul 2018 17:00)  HR: 68 (15 Jul 2018 06:00) (60 - 93)  BP: 102/33 (15 Jul 2018 05:00) (95/32 - 143/57)  BP(mean): 50 (15 Jul 2018 05:00) (47 - 77)  RR: 23 (15 Jul 2018 06:00) (16 - 25)  SpO2: 91% (15 Jul 2018 06:00) (87% - 99%)  PHYSICAL EXAM  General Appearance: cooperative, no acute distress,   HEENT: PERRL, conjunctiva clear, EOM's intact, non injected pharynx, no exudate, TM   normal  Neck: Supple, , no adenopathy, thyroid: not enlarged, no carotid bruit or JVD  Back: Symmetric, no  tenderness,no soft tissue tenderness  Lungs: Diminished bilaterally R>L with some dullness to percussion  Heart: Regular rate and rhythm, S1, S2 normal, no murmur, rub or gallop  Abdomen:  decreased-tender,no active bowel sounds, Distended , no hepatosplenomegaly  Extremities: pos peripheral edema / Hx Right leg amputation  Skin: Skin color, texture normal, no rashes   Neurologic: Alert and oriented X3 , cranial nerves intact, sensory and motor normal,    ECG:    LABS:                        8.4    8.91  )-----------( 181      ( 15 Jul 2018 06:16 )             26.4   07-15    142  |  107  |  23  ----------------------------<  101<H>  3.3<L>   |  26  |  3.04<H>    Ca    7.5<L>      15 Jul 2018 06:16                            8.2    10.15 )-----------( 183      ( 13 Jul 2018 05:14 )             26.1   07-13    143  |  107  |  26<H>  ----------------------------<  89  3.5   |  26  |  2.62<H>    Ca    7.2<L>      13 Jul 2018 05:14  Phos  4.3     07-12  Mg     2.0     07-12    TPro  x   /  Alb  2.0<L>  /  TBili  x   /  DBili  x   /  AST  x   /  ALT  x   /  AlkPhos  x   07-12                            8.3    10.62 )-----------( 202      ( 12 Jul 2018 05:17 )             26.8   07-12    141  |  107  |  33<H>  ----------------------------<  66<L>  3.9   |  23  |  2.84<H>    Ca    7.4<L>      12 Jul 2018 05:17  Phos  4.3     07-12  Mg     2.0     07-12    TPro  x   /  Alb  2.0<L>  /  TBili  x   /  DBili  x   /  AST  x   /  ALT  x   /  AlkPhos  x   07-12                          8.8    20.36 )-----------( 265      ( 10 Jul 2018 05:47 )             28.0   07-11    142  |  108  |  52<H>  ----------------------------<  80  4.0   |  23  |  3.82<H>    Ca    7.0<L>      11 Jul 2018 06:20  Phos  4.9     07-11  Mg     1.9     07-11                              8.1    20.78 )-----------( 239      ( 09 Jul 2018 05:48 )             25.6     07-09    144  |  114<H>  |  65<H>  ----------------------------<  113<H>  4.5   |  17<L>  |  3.90<H>    Ca    7.1<L>      09 Jul 2018 05:48  Mg     1.9     07-09      CARDIAC MARKERS ( 07 Jul 2018 17:51 )  0.024 ng/mL / x     / x     / x     / x                  ABG - ( 08 Jul 2018 15:47 )  pH, Arterial: 7.18  pH, Blood: x     /  pCO2: 39    /  pO2: 68    / HCO3: 14    / Base Excess: -13.1 /  SaO2: 92            < from: Xray Chest 1 View-PORTABLE IMMEDIATE (07.10.18 @ 15:50) >  INTERPRETATION:  CLINICAL INDICATION:  R  I J dialysis line placement    TECHNIQUE: Single view AP chest radiograph was performed.    COMPARISON:  Chest radiograph performed earlier on the same day,   7/10/2018 at 8:28 AM and chest radiograph dated 7/6/2018.    FINDINGS:    Interval placement of right-sided jugular central venous catheter with   tip projecting over the right atrium. No evidence forpneumothorax.    There is persistent mild pulmonary vascular congestion. There is trace   fluid within the right minor fissure.    The cardiac silhouette size is stable. Diffuse aortic calcifications are   noted    Redemonstration of anchor screw within the right humeral head.     IMPRESSION:    Interval placement of right-sided IJ CVC, with tip projecting over the   right atrium. No pneumothorax.     < end of copied text >          RADIOLOGY & ADDITIONAL STUDIES:  IMPRESSION:     Severe pancolitis consistent with pseudomembranous colitis.    Mild pulmonary edema.    Small loculated right and trace layering left pleural effusions.

## 2018-07-15 NOTE — PROGRESS NOTE ADULT - SUBJECTIVE AND OBJECTIVE BOX
Tolerating diet. Still having loose stools and rectal tube placed. Reports some right lower abdominal pain.     Exam:  Vital Signs Last 24 Hrs  T(C): 36.1 (15 Jul 2018 01:00), Max: 37.4 (14 Jul 2018 17:00)  T(F): 97 (15 Jul 2018 01:00), Max: 99.3 (14 Jul 2018 17:00)  HR: 74 (15 Jul 2018 09:10) (60 - 93)  BP: 102/33 (15 Jul 2018 05:00) (95/32 - 143/57)  BP(mean): 50 (15 Jul 2018 05:00) (47 - 77)  RR: 23 (15 Jul 2018 06:00) (17 - 25)  SpO2: 91% (15 Jul 2018 06:00) (87% - 98%)    General: in no distress  Abdomen: soft, distended, right lower abdominal tenderness                                    8.4    8.91  )-----------( 181      ( 15 Jul 2018 06:16 )             26.4   07-15    142  |  107  |  23  ----------------------------<  101<H>  3.3<L>   |  26  |  3.04<H>    Ca    7.5<L>      15 Jul 2018 06:16

## 2018-07-15 NOTE — PROGRESS NOTE ADULT - SUBJECTIVE AND OBJECTIVE BOX
· Subjective and Objective: 	  Events Overnight: having some diarrhea, feels a little better, creatinine inc from 2.6 to 3.2, yesterday    HPI:     Pt is a 82 y/o M w/pmhx of Afib, CHF, CAD s/p stents, COPD, PNA, upper GI bleed (duodenal)  BIB EMS from [facility] assisted living for fever, abd pain, and diarrhea that began 3 weeks ago. Was in the hospital for PNA tx and was later dx with C-diff and started on a course of PO vancomycin for 10 days for the C-diff. saw admiited with c. dif, developed renal failure, on dialysis  on po vanco and iv flagyl, wbc better no fever, distention a little better    7/15- report still some abdominal distention and minimal tenderness to abdomen on palpation but much improved from before  reports breathing improved ,dnies SOB or nasal cannula      PHYSICAL EXAM:    Daily     Daily     ICU Vital Signs Last 24 Hrs  T(C): 36.8 (15 Jul 2018 12:00), Max: 37.4 (14 Jul 2018 17:00)  T(F): 98.3 (15 Jul 2018 12:00), Max: 99.3 (14 Jul 2018 17:00)  HR: 75 (15 Jul 2018 16:00) (62 - 91)  BP: 120/48 (15 Jul 2018 15:00) (99/42 - 143/57)  BP(mean): 61 (15 Jul 2018 15:00) (50 - 77)  ABP: --  ABP(mean): --  RR: 19 (15 Jul 2018 16:00) (17 - 27)  SpO2: 94% (15 Jul 2018 16:00) (88% - 99%)      Constitutional: NAD  HEENT: Atraumatic, DEBBIE, Normal, No congestion  Respiratory: decreased breath sounds B/l lung bases  Cardiovascular: N S1S2;   Gastrointestinal: Abdomen soft,distended, no rebound, no guarding  Extremities: No edema,   Neurological: AAO x 3,      Musculoskeletal: non tender  Breasts: Deferred  Genitourinary: deferred  Rectal: Deferred                          8.4    8.91  )-----------( 181      ( 15 Jul 2018 06:16 )             26.4       CBC Full  -  ( 15 Jul 2018 06:16 )  WBC Count : 8.91 K/uL  Hemoglobin : 8.4 g/dL  Hematocrit : 26.4 %  Platelet Count - Automated : 181 K/uL  Mean Cell Volume : 89.5 fl  Mean Cell Hemoglobin : 28.5 pg  Mean Cell Hemoglobin Concentration : 31.8 gm/dL  Auto Neutrophil # : x  Auto Lymphocyte # : x  Auto Monocyte # : x  Auto Eosinophil # : x  Auto Basophil # : x  Auto Neutrophil % : x  Auto Lymphocyte % : x  Auto Monocyte % : x  Auto Eosinophil % : x  Auto Basophil % : x      07-15    142  |  107  |  23  ----------------------------<  101<H>  3.3<L>   |  26  |  3.04<H>    Ca    7.5<L>      15 Jul 2018 06:16                              MEDICATIONS  (STANDING):  ALBUTerol/ipratropium for Nebulization. 3 milliLiter(s) Nebulizer once  allopurinol 100 milliGRAM(s) Oral daily  aspirin  chewable 81 milliGRAM(s) Oral daily  buDESOnide   0.5 milliGRAM(s) Respule 0.5 milliGRAM(s) Inhalation two times a day  diltiazem    Tablet 30 milliGRAM(s) Oral every 6 hours  doxazosin 8 milliGRAM(s) Oral at bedtime  ferrous    sulfate 325 milliGRAM(s) Oral daily  finasteride 5 milliGRAM(s) Oral daily  heparin  Injectable 5000 Unit(s) SubCutaneous every 12 hours  isosorbide   mononitrate ER Tablet (IMDUR) 120 milliGRAM(s) Oral daily  metoprolol tartrate 25 milliGRAM(s) Oral two times a day  metroNIDAZOLE  IVPB      metroNIDAZOLE  IVPB 500 milliGRAM(s) IV Intermittent every 8 hours  simvastatin 10 milliGRAM(s) Oral at bedtime  vancomycin    Solution 500 milliGRAM(s) Oral every 6 hours

## 2018-07-15 NOTE — H&P ADULT - NSHPOUTPATIENTPROVIDERS_GEN_ALL_CORE
Received bedside report from DAVID Sam. Pt currently resting on stretcher. Pt denies SI or HI. Pt states that he needs help with his drug abuse. Pt reports that she uses heroin and cocaine. Will continue to monitor. Dr martin PMD

## 2018-07-15 NOTE — PROGRESS NOTE ADULT - ASSESSMENT
· Assessment		  1.  c . dif colitis and leucocytosis improving  currently with rectal tube   continue po vanco and flagyl per ID  GI and surgery f/u appreciated   ID follow up pending for duration of abx   ,  2. developed acute renal failure requiring HD for the 1st time this admission   currently with Right temp HD cath  likely for perma cath on 7/16  HD per renal      #Anasarca/B/l pleural effusions likely from ARYA  now improving with HD    # hx restrictive lung disease and SHAYY  continue supplemental o2 and BIPAP per pulm    # DVT prophylaxis    # · Assessment		  1.  c . dif colitis and leucocytosis improving  currently with rectal tube   continue po vanco and flagyl per ID  GI and surgery f/u appreciated   ID follow up pending for duration of abx   ,  2. developed acute renal failure requiring HD for the 1st time this admission   currently with Right temp HD cath  likely for perma cath on 7/16  HD per renal      #Anasarca/B/l pleural effusions likely from ARYA  now improving with HD    # hx restrictive lung disease and SHAYY on BIPAP in past stopped BIPAP after weight loss  continue supplemental o2     # DVT prophylaxis  heparin sc

## 2018-07-15 NOTE — PROGRESS NOTE ADULT - SUBJECTIVE AND OBJECTIVE BOX
NEPHROLOGY INTERVAL HPI/OVERNIGHT EVENTS:    7/15  Patient in ICU  Patient's daughter at the bedside  Case discussed with them as well  Overall he feels well  Is able to tolerate some oral intake  Dialysis due tomorrow        7/14  The patient seen on dialysis  Temporary catheter not working well  High pressures  Discussed with patient's daughter that mostly likely by Monday the patient may require placement of a permanent catheter.      7/13  The patient was dialyzed 3 days in a row this week  Discussed with the patient's daughter and hospitalist, intensivist  Will skip dialysis today, no urgent need for dialysis  We'll dialyze the patient tomorrow on Saturday and then skip Sunday to dialyze the patient again on Monday.    7/12 SY  Feeling fairly ok today.  Surgery follow up appreciated.  To start clear liquid diet.  Denies SOB.    7/11 SY  Tolerated first HD fairly ok.  Alert and responsive.  Denies SOB.  Denies Abdominal pain today.    7/10  Incontinent pt , documented anuric  Straight cath x 1,  brown urine 200 ml  family at bedside  on ivf + bicarb @ 125 ml/hr    7/10 addendum  R IJ placed   family at bedside   dialysis to be initiated  pt comfortable  CXR no PTX    HPI:  84 y/o wm with hx of ckd stage 3 ( scr about 1.6-1.7) presents from Assisted living with fever, abd pain and diarrhea.  Had been dx with cdiff 3 weeks ago and placed on PO vanc at that time.  Since admission, Ct reveals pan colitis + cdiff and on increasing dose of po vanc.  Noted with rising scr despite ivf and renal consult requested.      Pt states that he feels better today.  MOre alert and awake.  diarrhea has decreased and his abd pain improved.  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,        MEDICATIONS  (STANDING):  ALBUTerol/ipratropium for Nebulization. 3 milliLiter(s) Nebulizer once  allopurinol 100 milliGRAM(s) Oral daily  buDESOnide   0.5 milliGRAM(s) Respule 0.5 milliGRAM(s) Inhalation two times a day  diltiazem    Tablet 30 milliGRAM(s) Oral every 6 hours  doxazosin 8 milliGRAM(s) Oral at bedtime  ferrous    sulfate 325 milliGRAM(s) Oral daily  finasteride 5 milliGRAM(s) Oral daily  heparin  Injectable 5000 Unit(s) SubCutaneous every 12 hours  isosorbide   mononitrate ER Tablet (IMDUR) 120 milliGRAM(s) Oral daily  metoprolol tartrate 25 milliGRAM(s) Oral two times a day  metroNIDAZOLE  IVPB      metroNIDAZOLE  IVPB 500 milliGRAM(s) IV Intermittent every 8 hours  simvastatin 10 milliGRAM(s) Oral at bedtime  vancomycin    Solution 500 milliGRAM(s) Oral every 6 hours    MEDICATIONS  (PRN):  acetaminophen   Tablet 650 milliGRAM(s) Oral every 8 hours PRN For Temp greater than 38 C (100.4 F)  acetaminophen   Tablet. 650 milliGRAM(s) Oral every 8 hours PRN Mild Pain (1 - 3)  albumin human 25% IVPB 50 milliLiter(s) IV Intermittent <User Schedule> PRN for SBP</=120  ALBUTerol   0.5% 2.5 milliGRAM(s) Nebulizer every 4 hours PRN Shortness of Breath and/or Wheezing  HYDROmorphone  Injectable 0.5 milliGRAM(s) IV Push every 6 hours PRN Moderate Pain (4 - 6)  ondansetron Injectable 4 milliGRAM(s) IV Push every 6 hours PRN Nausea and/or Vomiting        ICU Vital Signs Last 24 Hrs  T(C): 37.1 (15 Jul 2018 16:00), Max: 37.1 (15 Jul 2018 16:00)  T(F): 98.8 (15 Jul 2018 16:00), Max: 98.8 (15 Jul 2018 16:00)  HR: 83 (15 Jul 2018 18:00) (62 - 91)  BP: 123/43 (15 Jul 2018 18:00) (99/42 - 143/57)  BP(mean): 60 (15 Jul 2018 18:00) (50 - 77)  ABP: --  ABP(mean): --  RR: 23 (15 Jul 2018 18:00) (17 - 27)  SpO2: 95% (15 Jul 2018 18:00) (88% - 99%)      07-11 @ 07:01  -  07-12 @ 07:00  --------------------------------------------------------  IN: 300 mL / OUT: 450 mL / NET: -150 mL        PHYSICAL EXAM:  Alert and appropriate  GENERAL: No distress  CHEST/LUNG: Fair air entry   HEART: S1S2 RRR  ABDOMEN: distended.  EXTREMITIES: 2= pitting edema in LE ext and abdominal wall.  SKIN:     LABS:                        8.4    8.91  )-----------( 181      ( 15 Jul 2018 06:16 )             26.4       07-15    142  |  107  |  23  ----------------------------<  101<H>  3.3<L>   |  26  |  3.04<H>    Ca    7.5<L>      15 Jul 2018 06:16

## 2018-07-16 LAB
ANION GAP SERPL CALC-SCNC: 11 MMOL/L — SIGNIFICANT CHANGE UP (ref 5–17)
APTT BLD: 28.2 SEC — SIGNIFICANT CHANGE UP (ref 27.5–37.4)
BASOPHILS # BLD AUTO: 0.03 K/UL — SIGNIFICANT CHANGE UP (ref 0–0.2)
BASOPHILS NFR BLD AUTO: 0.3 % — SIGNIFICANT CHANGE UP (ref 0–2)
BUN SERPL-MCNC: 30 MG/DL — HIGH (ref 7–23)
CALCIUM SERPL-MCNC: 7.5 MG/DL — LOW (ref 8.5–10.1)
CHLORIDE SERPL-SCNC: 108 MMOL/L — SIGNIFICANT CHANGE UP (ref 96–108)
CO2 SERPL-SCNC: 24 MMOL/L — SIGNIFICANT CHANGE UP (ref 22–31)
CREAT SERPL-MCNC: 3.68 MG/DL — HIGH (ref 0.5–1.3)
EOSINOPHIL # BLD AUTO: 0.18 K/UL — SIGNIFICANT CHANGE UP (ref 0–0.5)
EOSINOPHIL NFR BLD AUTO: 1.8 % — SIGNIFICANT CHANGE UP (ref 0–6)
GLUCOSE SERPL-MCNC: 99 MG/DL — SIGNIFICANT CHANGE UP (ref 70–99)
HCT VFR BLD CALC: 27.3 % — LOW (ref 39–50)
HGB BLD-MCNC: 8.6 G/DL — LOW (ref 13–17)
IMM GRANULOCYTES NFR BLD AUTO: 1.7 % — HIGH (ref 0–1.5)
INR BLD: 1.17 RATIO — HIGH (ref 0.88–1.16)
LYMPHOCYTES # BLD AUTO: 0.61 K/UL — LOW (ref 1–3.3)
LYMPHOCYTES # BLD AUTO: 6.2 % — LOW (ref 13–44)
MCHC RBC-ENTMCNC: 28.4 PG — SIGNIFICANT CHANGE UP (ref 27–34)
MCHC RBC-ENTMCNC: 31.5 GM/DL — LOW (ref 32–36)
MCV RBC AUTO: 90.1 FL — SIGNIFICANT CHANGE UP (ref 80–100)
MONOCYTES # BLD AUTO: 0.69 K/UL — SIGNIFICANT CHANGE UP (ref 0–0.9)
MONOCYTES NFR BLD AUTO: 7 % — SIGNIFICANT CHANGE UP (ref 2–14)
NEUTROPHILS # BLD AUTO: 8.17 K/UL — HIGH (ref 1.8–7.4)
NEUTROPHILS NFR BLD AUTO: 83 % — HIGH (ref 43–77)
NRBC # BLD: 0 /100 WBCS — SIGNIFICANT CHANGE UP (ref 0–0)
PLATELET # BLD AUTO: 180 K/UL — SIGNIFICANT CHANGE UP (ref 150–400)
POTASSIUM SERPL-MCNC: 3.2 MMOL/L — LOW (ref 3.5–5.3)
POTASSIUM SERPL-SCNC: 3.2 MMOL/L — LOW (ref 3.5–5.3)
PROTHROM AB SERPL-ACNC: 12.7 SEC — SIGNIFICANT CHANGE UP (ref 9.8–12.7)
RBC # BLD: 3.03 M/UL — LOW (ref 4.2–5.8)
RBC # FLD: 19.9 % — HIGH (ref 10.3–14.5)
SODIUM SERPL-SCNC: 143 MMOL/L — SIGNIFICANT CHANGE UP (ref 135–145)
WBC # BLD: 9.85 K/UL — SIGNIFICANT CHANGE UP (ref 3.8–10.5)
WBC # FLD AUTO: 9.85 K/UL — SIGNIFICANT CHANGE UP (ref 3.8–10.5)

## 2018-07-16 PROCEDURE — 99233 SBSQ HOSP IP/OBS HIGH 50: CPT

## 2018-07-16 PROCEDURE — 36558 INSERT TUNNELED CV CATH: CPT

## 2018-07-16 PROCEDURE — 76937 US GUIDE VASCULAR ACCESS: CPT | Mod: 26

## 2018-07-16 PROCEDURE — 71045 X-RAY EXAM CHEST 1 VIEW: CPT | Mod: 26

## 2018-07-16 PROCEDURE — 93010 ELECTROCARDIOGRAM REPORT: CPT

## 2018-07-16 PROCEDURE — 77001 FLUOROGUIDE FOR VEIN DEVICE: CPT | Mod: 26

## 2018-07-16 RX ORDER — CHOLESTYRAMINE 4 G/9G
4 POWDER, FOR SUSPENSION ORAL
Qty: 0 | Refills: 0 | Status: DISCONTINUED | OUTPATIENT
Start: 2018-07-16 | End: 2018-08-09

## 2018-07-16 RX ORDER — FAMOTIDINE 10 MG/ML
20 INJECTION INTRAVENOUS ONCE
Qty: 0 | Refills: 0 | Status: COMPLETED | OUTPATIENT
Start: 2018-07-16 | End: 2018-07-16

## 2018-07-16 RX ORDER — FAMOTIDINE 10 MG/ML
20 INJECTION INTRAVENOUS ONCE
Qty: 0 | Refills: 0 | Status: DISCONTINUED | OUTPATIENT
Start: 2018-07-16 | End: 2018-07-16

## 2018-07-16 RX ADMIN — Medication 100 MILLIGRAM(S): at 06:42

## 2018-07-16 RX ADMIN — HYDROMORPHONE HYDROCHLORIDE 0.5 MILLIGRAM(S): 2 INJECTION INTRAMUSCULAR; INTRAVENOUS; SUBCUTANEOUS at 15:30

## 2018-07-16 RX ADMIN — Medication 650 MILLIGRAM(S): at 07:12

## 2018-07-16 RX ADMIN — Medication 500 MILLIGRAM(S): at 13:57

## 2018-07-16 RX ADMIN — CHOLESTYRAMINE 4 GRAM(S): 4 POWDER, FOR SUSPENSION ORAL at 21:01

## 2018-07-16 RX ADMIN — Medication 500 MILLIGRAM(S): at 06:41

## 2018-07-16 RX ADMIN — Medication 81 MILLIGRAM(S): at 14:03

## 2018-07-16 RX ADMIN — Medication 500 MILLIGRAM(S): at 23:19

## 2018-07-16 RX ADMIN — Medication 50 MILLILITER(S): at 11:18

## 2018-07-16 RX ADMIN — Medication 8 MILLIGRAM(S): at 23:19

## 2018-07-16 RX ADMIN — SIMVASTATIN 10 MILLIGRAM(S): 20 TABLET, FILM COATED ORAL at 23:19

## 2018-07-16 RX ADMIN — Medication 0.5 MILLIGRAM(S): at 09:29

## 2018-07-16 RX ADMIN — HEPARIN SODIUM 5000 UNIT(S): 5000 INJECTION INTRAVENOUS; SUBCUTANEOUS at 06:41

## 2018-07-16 RX ADMIN — Medication 25 MILLIGRAM(S): at 05:34

## 2018-07-16 RX ADMIN — FAMOTIDINE 20 MILLIGRAM(S): 10 INJECTION INTRAVENOUS at 18:38

## 2018-07-16 RX ADMIN — Medication 100 MILLIGRAM(S): at 14:03

## 2018-07-16 RX ADMIN — Medication 0.5 MILLIGRAM(S): at 19:55

## 2018-07-16 RX ADMIN — HYDROMORPHONE HYDROCHLORIDE 0.5 MILLIGRAM(S): 2 INJECTION INTRAMUSCULAR; INTRAVENOUS; SUBCUTANEOUS at 08:38

## 2018-07-16 RX ADMIN — HYDROMORPHONE HYDROCHLORIDE 0.5 MILLIGRAM(S): 2 INJECTION INTRAMUSCULAR; INTRAVENOUS; SUBCUTANEOUS at 09:00

## 2018-07-16 RX ADMIN — Medication 100 MILLIGRAM(S): at 14:02

## 2018-07-16 RX ADMIN — Medication 100 MILLIGRAM(S): at 23:20

## 2018-07-16 RX ADMIN — Medication 500 MILLIGRAM(S): at 18:38

## 2018-07-16 RX ADMIN — FINASTERIDE 5 MILLIGRAM(S): 5 TABLET, FILM COATED ORAL at 14:03

## 2018-07-16 RX ADMIN — HEPARIN SODIUM 5000 UNIT(S): 5000 INJECTION INTRAVENOUS; SUBCUTANEOUS at 18:38

## 2018-07-16 RX ADMIN — HYDROMORPHONE HYDROCHLORIDE 0.5 MILLIGRAM(S): 2 INJECTION INTRAMUSCULAR; INTRAVENOUS; SUBCUTANEOUS at 16:45

## 2018-07-16 NOTE — PROGRESS NOTE ADULT - SUBJECTIVE AND OBJECTIVE BOX
Patient is a 83y old  Male who presents with a chief complaint of complain of diarrhea, fever (06 Jul 2018 23:55)      HPI:  Pt is a 82 y/o M w/pmhx of Afib, CHF, CAD s/p stents, COPD, PNA, upper GI bleed (duodenal)  BIB EMS from [facility] assisted living for fever, abd pain, and diarrhea that began 3 weeks ago. Was in the hospital for PNA tx and was later dx with C-diff and started on a course of PO vancomycin for 10 days for the C-diff. States that he has had diarrhea since before the course of abx. Pain has been increased for the past few weeks and is characterized as a cramping feeling. Daughters also note that there is increased distension. Also notes chest pain characterized as a cramping in the central chest. 83% O2 sat noted this morning at the assisted living facility. Takes O2 routinely at night but not during the day. (06 Jul 2018 23:55)    History per patient and daughter.  Patient with continued liquid stool, fecal bag was placed. The stool is dark. Negative abdominal pain. Tolerating diet but eating small amounts. Negative chest pain or shortness of breath.  Catheter in neck.    States that he had some mild reflux last night and increased after eating. However, this has resolved. Negative dysphagia.      PAST MEDICAL & SURGICAL HISTORY:  Diastolic CHF  Peripheral vascular disease  Afib  Anemia  CKD (chronic kidney disease)  COPD (chronic obstructive pulmonary disease)  SHAYY (obstructive sleep apnea)  Sepsis, due to unspecified organism: 2/2 poorly healing wounds b/l  Dyspepsia: On moderate exertion.  Sleep apnea, obstructive: Requires home 02 therapy, and treatment with BIPAP  Atelectasis  Pleural effusion, bilateral  Respiratory failure  Peripheral edema  CRI (chronic renal insufficiency)  Gout  Benign prostatic hypertrophy  Spinal stenosis  Hypercholesterolemia  GERD (gastroesophageal reflux disease)  CAD (coronary artery disease)  Hypertension  S/P angioplasty with stent  Cataract of left eye  Prostate: Surgery green light procedure.  S/P rotator cuff surgery: Right  S/P angioplasty      MEDICATIONS  (STANDING):  ALBUTerol/ipratropium for Nebulization. 3 milliLiter(s) Nebulizer once  allopurinol 100 milliGRAM(s) Oral daily  aspirin  chewable 81 milliGRAM(s) Oral daily  buDESOnide   0.5 milliGRAM(s) Respule 0.5 milliGRAM(s) Inhalation two times a day  cholestyramine Powder (Sugar-Free) 4 Gram(s) Oral two times a day  diltiazem    Tablet 30 milliGRAM(s) Oral every 6 hours  doxazosin 8 milliGRAM(s) Oral at bedtime  finasteride 5 milliGRAM(s) Oral daily  heparin  Injectable 5000 Unit(s) SubCutaneous every 12 hours  isosorbide   mononitrate ER Tablet (IMDUR) 120 milliGRAM(s) Oral daily  metoprolol tartrate 25 milliGRAM(s) Oral two times a day  metroNIDAZOLE  IVPB      metroNIDAZOLE  IVPB 500 milliGRAM(s) IV Intermittent every 8 hours  simvastatin 10 milliGRAM(s) Oral at bedtime  vancomycin    Solution 500 milliGRAM(s) Oral every 6 hours    MEDICATIONS  (PRN):  acetaminophen   Tablet 650 milliGRAM(s) Oral every 8 hours PRN For Temp greater than 38 C (100.4 F)  acetaminophen   Tablet. 650 milliGRAM(s) Oral every 8 hours PRN Mild Pain (1 - 3)  albumin human 25% IVPB 50 milliLiter(s) IV Intermittent <User Schedule> PRN for SBP</=120  albumin human 25% IVPB 50 milliLiter(s) IV Intermittent every 1 hour PRN for SBP,120  ALBUTerol   0.5% 2.5 milliGRAM(s) Nebulizer every 4 hours PRN Shortness of Breath and/or Wheezing  diphenhydrAMINE   Capsule 25 milliGRAM(s) Oral at bedtime PRN sleep  HYDROmorphone  Injectable 0.5 milliGRAM(s) IV Push every 6 hours PRN Moderate Pain (4 - 6)  ondansetron Injectable 4 milliGRAM(s) IV Push every 6 hours PRN Nausea and/or Vomiting      Allergies    Zosyn (Rash)    Intolerances        SOCIAL HISTORY:NC    FAMILY HISTORY:  No pertinent family history in first degree relatives      REVIEW OF SYSTEMS:    CONSTITUTIONAL: No weakness, fevers or chills  EYES/ENT: No visual changes;  No vertigo or throat pain   NECK: No pain or stiffness  RESPIRATORY: No cough, wheezing, hemoptysis; No shortness of breath  CARDIOVASCULAR: No chest pain or palpitations  GENITOURINARY: No dysuria, frequency or hematuria  NEUROLOGICAL: No numbness or weakness  SKIN: No itching, burning, rashes, or lesions   All other review of systems is negative unless indicated above.    Vital Signs Last 24 Hrs  T(C): 36.7 (16 Jul 2018 05:00), Max: 37.1 (15 Jul 2018 16:00)  T(F): 98 (16 Jul 2018 05:00), Max: 98.8 (15 Jul 2018 16:00)  HR: 71 (16 Jul 2018 06:00) (70 - 91)  BP: 124/58 (16 Jul 2018 06:00) (102/50 - 142/42)  BP(mean): 73 (16 Jul 2018 06:00) (56 - 73)  RR: 21 (16 Jul 2018 06:00) (16 - 27)  SpO2: 96% (16 Jul 2018 06:00) (88% - 99%)    PHYSICAL EXAM:    Constitutional: NAD, well-developed  HEENT: EOMI, throat clear  Neck: No LAD, supple  Respiratory: CTA and P  Cardiovascular: S1 and S2, RRR, no M  Gastrointestinal: BS+, soft, NT/mildly distended, neg HSM,fecal bag with liquid stool  Extremities: No peripheral edema, neg clubing, cyanosis    Neurological: A/O x 3, no focal deficits  Psychiatric: Normal mood, normal affect  Skin: No rashes    LABS:  CBC Full  -  ( 16 Jul 2018 05:24 )  WBC Count : 9.85 K/uL  Hemoglobin : 8.6 g/dL  Hematocrit : 27.3 %  Platelet Count - Automated : 180 K/uL  Mean Cell Volume : 90.1 fl  Mean Cell Hemoglobin : 28.4 pg  Mean Cell Hemoglobin Concentration : 31.5 gm/dL  Auto Neutrophil # : 8.17 K/uL  Auto Lymphocyte # : 0.61 K/uL  Auto Monocyte # : 0.69 K/uL  Auto Eosinophil # : 0.18 K/uL  Auto Basophil # : 0.03 K/uL  Auto Neutrophil % : 83.0 %  Auto Lymphocyte % : 6.2 %  Auto Monocyte % : 7.0 %  Auto Eosinophil % : 1.8 %  Auto Basophil % : 0.3 %    07-16    143  |  108  |  30<H>  ----------------------------<  99  3.2<L>   |  24  |  3.68<H>    Ca    7.5<L>      16 Jul 2018 05:24          07-10 @ 16:30  Hep A Igm Nonreact  Hep A total ab, IgA and M --  Hep B core Ab total --  Hep B core IgM Nonreact  Hep B DNA PCR --  Hep BSAg --  Hep BSAb --  Hep BSAb --  HCV Ab --  HCV RNA Log --  HCV RNA interp --                RADIOLOGY & ADDITIONAL STUDIES:

## 2018-07-16 NOTE — PROGRESS NOTE ADULT - ASSESSMENT
1) Clostridium Difficile Colitis clinically improved  2) Metabolic Acidosis  3) Restrictive Ventilatory Disease  4) SHAYY  5) Bilateral Pleural Effusions  6) Renal failure on dilaysis now  7) Leukocytosis is clinically improved    84 y/o M w/pmhx of Afib, CHF, CAD s/p stents, COPD, PNA, upper GI bleed (duodenal)  BIB EMS from [facility] assisted living for fever, abd pain, and diarrhea that began 3 weeks ago. Was in the hospital for ESBL and was later dx with C-diff and started on a course of PO vancomycin for 10 days for the C-diff. At the present time has diffuse abdominal pain, being treated Vancomycin and IV Metronidazole  ABG reviewed and reveals 7.18/39/68, LA normal limits  Etiology likely from sepsis   repeat ABG noted  ABG - ( 09 Jul 2018 11:50 )  pH, Arterial: 7.21  pH, Blood: x     /  pCO2: 42    /  pO2: 67    / HCO3: 16    / Base Excess: -10.6 /  SaO2: 91      repeat cxr noted  At the present time, patient states he would like to be a full code  Continue monitoring hemodynamics (daughter states baseline diastolic tends to run between 25-40mmHg)   Monitor urine output aggressively   Used BIPAP/CPAP in the past (stopped as an outpatient after patient lost weight), may worsen intraabdominal pressures but if arrhythmias are present, may consider starting again.   Appreciate nephrology/cardiology/ID/GI/hospitalist recommendations  Will continue to monitor   hx of resp failure and intubation 2012 x 3-4 days  feels ok with breathing now  being evaluated for surgical interventions with pancolitis  possibility of post op need for vent support discussed with pt and his dtr  he wants to proceed understanding high risk for surgery due to compromised medical condition  no need for thoracentesis    s/p Shiley and hemodialysis  still with abd distention but NGT is now discontinued and tolerated po yesterday  overall prognosis remains guarded  improving clinically with decreased leukocytosis  tolerating PO intake, monitor clinically  may need permannt cath placement tomorrow for dialysis  pulm status appears optimized for low risk procedure  fu cxr afterwards  hx SHAYY and treated with BIPAP in the past / stopped using it after wt loss  Thank you for the consult

## 2018-07-16 NOTE — PROGRESS NOTE ADULT - SUBJECTIVE AND OBJECTIVE BOX
Cardiology Progress Note  Patient is a 83y old  Male who presents with a chief complaint of complain of diarrhea, fever.     HPI: Pt is a 84 y/o M w/pmhx of Afib, CHF, CAD s/p stents, COPD, PNA, upper GI bleed (duodenal)  BIB EMS from [facility] assisted living for fever, abd pain, and diarrhea that began 3 weeks ago. Was in the hospital for PNA tx and was later dx with C-diff and started on a course of PO vancomycin for 10 days for the C-diff. States that he has had diarrhea since before the course of abx. Pain has been increased for the past few weeks and is characterized as a cramping feeling. Daughters also note that there is increased distension.     7/10- pt seen and examined today am.  Daughter at bedside.     7/11- pt seen and examined by me today.  He denies any symptoms. Much alert today. NG tube in place./  Daughter at bedside.    7/16- Case d/w nursing, pt and daughter. Rectal tube placed. No CP/SOB. Continues to have diarrhea but mildly improved.     PAST MEDICAL & SURGICAL HISTORY:  Diastolic CHF  Peripheral vascular disease  Afib  Anemia  CKD (chronic kidney disease)  COPD (chronic obstructive pulmonary disease)  SHAYY (obstructive sleep apnea)  Sepsis, due to unspecified organism: 2/2 poorly healing wounds b/l  Dyspepsia: On moderate exertion.  Sleep apnea, obstructive: Requires home 02 therapy, and treatment with BIPAP  Atelectasis  Pleural effusion, bilateral  Respiratory failure  Peripheral edema  CRI (chronic renal insufficiency)  Gout  Benign prostatic hypertrophy  Spinal stenosis  Hypercholesterolemia  GERD (gastroesophageal reflux disease)  CAD (coronary artery disease)  Hypertension  S/P angioplasty with stent  Cataract of left eye  Prostate: Surgery green light procedure.  S/P rotator cuff surgery: Right  S/P angioplasty      MEDICATIONS  (STANDING):  allopurinol 100 milliGRAM(s) Oral daily  buDESOnide   0.5 milliGRAM(s) Respule 0.5 milliGRAM(s) Inhalation two times a day  dextrose 5% 1000 milliLiter(s) (75 mL/Hr) IV Continuous <Continuous>  diltiazem    Tablet 30 milliGRAM(s) Oral every 6 hours  doxazosin 8 milliGRAM(s) Oral at bedtime  ferrous    sulfate 325 milliGRAM(s) Oral daily  finasteride 5 milliGRAM(s) Oral daily  hydrALAZINE 50 milliGRAM(s) Oral two times a day  isosorbide   mononitrate ER Tablet (IMDUR) 120 milliGRAM(s) Oral daily  metoprolol tartrate 25 milliGRAM(s) Oral two times a day  metroNIDAZOLE  IVPB      metroNIDAZOLE  IVPB 500 milliGRAM(s) IV Intermittent every 8 hours  simvastatin 10 milliGRAM(s) Oral at bedtime  vancomycin    Solution 500 milliGRAM(s) Oral every 6 hours    MEDICATIONS  (PRN):  acetaminophen   Tablet 650 milliGRAM(s) Oral every 8 hours PRN For Temp greater than 38 C (100.4 F)  acetaminophen   Tablet. 650 milliGRAM(s) Oral every 8 hours PRN Mild Pain (1 - 3)  ALBUTerol   0.5% 2.5 milliGRAM(s) Nebulizer every 4 hours PRN Shortness of Breath and/or Wheezing  morphine  - Injectable 2 milliGRAM(s) IV Push every 6 hours PRN Severe Pain (7 - 10)  ondansetron Injectable 4 milliGRAM(s) IV Push every 6 hours PRN Nausea and/or Vomiting    FAMILY HISTORY: No pertinent family history in first degree relatives    SOCIAL HISTORY: as above.    REVIEW OF SYSTEMS:  CONSTITUTIONAL:    No fatigue, malaise, lethargy.  No fever or chills.  HEENT:  Eyes:  No visual changes.     ENT:  No epistaxis.  No sinus pain.    RESPIRATORY:  No cough.  No wheeze.  No hemoptysis.  No shortness of breath.  CARDIOVASCULAR:  No chest pains.  No palpitations. No shortness of breath, No orthopnea or PND.  GASTROINTESTINAL:  no abdominal pain.  No nausea or vomiting.    GENITOURINARY:    No hematuria.    MUSCULOSKELETAL:  No musculoskeletal pain.  No joint swelling.  No arthritis.  NEUROLOGICAL:  No tingling or numbness or weakness.  PSYCHIATRIC:  No confusion    Vital Signs Last 24 Hrs  T(C): 36.4 (09 Jul 2018 04:00), Max: 36.7 (08 Jul 2018 11:40)  T(F): 97.6 (09 Jul 2018 04:00), Max: 98.1 (08 Jul 2018 11:40)  HR: 66 (09 Jul 2018 07:40) (66 - 80)  BP: 115/23 (09 Jul 2018 04:00) (109/28 - 137/33)  BP(mean): --  RR: 16 (09 Jul 2018 04:00) (16 - 18)  SpO2: 93% (09 Jul 2018 04:00) (91% - 95%)    PHYSICAL EXAM-    Constitutional: ill looking elderly male in no acute distress.     Head: Head is normocephalic and atraumatic.      Neck: NG tube in place    Cardiovascular: Regular rate and rhythm without S3, S4. No murmurs or rubs are appreciated.      Respiratory: Breath sounds are normal. No rales. No wheezing.    Abdomen: Soft, nontender, distended with positive bowel sounds.      Extremity: No tenderness. No  pitting edema     Neurologic: The patient is alert and oriented.      INTERPRETATION OF TELEMETRY: sinus rythm, NSVT, bigeminy    ECG:    I&O's Detail      LABS:                        8.1    20.78 )-----------( 239      ( 09 Jul 2018 05:48 )             25.6     07-09    144  |  114<H>  |  65<H>  ----------------------------<  113<H>  4.5   |  17<L>  |  3.90<H>    Ca    7.1<L>      09 Jul 2018 05:48  Mg     1.9     07-09    CARDIAC MARKERS ( 07 Jul 2018 17:51 )  0.024 ng/mL / x     / x     / x     / x        I&O's Summary    BNP  RADIOLOGY & ADDITIONAL STUDIES:    < from: Transthoracic Echocardiogram (07.10.18 @ 10:21) >     Fibrocalcific changes noted to the mitral valve leaflets with preserved   leaflet excursion.   Moderate (2+) mitral regurgitation is present.   The left atrium is moderately dilated.   Mild concentric left ventricular hypertrophy is present.   Estimated left ventricular ejection fraction is 55-60 %.    < end of copied text >

## 2018-07-16 NOTE — PROGRESS NOTE ADULT - ASSESSMENT
· Assessment		  1.  c . dif colitis and leucocytosis improving  currently with rectal tube   continue po vanco # 10 and flagyl #9 per ID  still with rectal tube  cholestyramine started to solidify stools  GI and surgery f/u appreciated   ID follows up appreciated   ,  2. developed acute renal failure requiring HD for the 1st time this admission   now has right sided perma cath for HD -procedure done 7/16  s/pRight temp HD cath    HD per renal    #Chest pain s/p permacath procedure  will do EKG  CXR  give pepcid stat    #hx afib not on AC due to hx GI bleed in past  #s/p NSVT and bigemnini- on BB, echo EF wnl  #s/p transient bradycardia-mobitz type 2 thought to be vagal response-needs sleep study as outpatient  cardio evaluation appreciated       #Anasarca/B/l pleural effusions likely from ARYA  now improving with HD    # hx restrictive lung disease and SHAYY on BIPAP in past stopped BIPAP after weight loss  continue supplemental o2     # DVT prophylaxis  heparin sc · Assessment		  1.  c . dif colitis and leucocytosis improving  currently with rectal tube   continue po vanco # 10 and flagyl #9 per ID  still with rectal tube  cholestyramine started to solidify stools  GI and surgery f/u appreciated   ID follows up appreciated   ,  2. developed acute renal failure requiring HD for the 1st time this admission   now has right sided perma cath for HD -procedure done 7/16  s/pRight temp HD cath    HD per renal    #Chest pain s/p permacath procedure  will do EKG  CXR  give pepcid stat    #hx afib not on AC due to hx GI bleed in past  #s/p NSVT and bigemnini- on ccb, echo EF wnl  #s/p transient bradycardia-mobitz type 2 thought to be vagal response-needs sleep study as outpatient  cardio evaluation appreciated       #Anasarca/B/l pleural effusions likely from ARYA  now improving with HD    # hx restrictive lung disease and SHAYY on BIPAP in past stopped BIPAP after weight loss  continue supplemental o2     # DVT prophylaxis  heparin sc

## 2018-07-16 NOTE — PROGRESS NOTE ADULT - SUBJECTIVE AND OBJECTIVE BOX
Patient is a 83y old  Male who presents with a chief complaint of complain of diarrhea, fever (06 Jul 2018 23:55)    Date of service: 07-16-18 @ 13:45  Patient on HD; overall improved, afebrile; no complaints, abdominal pain resolved  ROS: no fever or chills; denies dizziness, no HA, no SOB or cough, no abdominal pain, no diarrhea or constipation; no dysuria, no urinary frequency, no legs pain, no rashes    MEDICATIONS  (STANDING):  ALBUTerol/ipratropium for Nebulization. 3 milliLiter(s) Nebulizer once  allopurinol 100 milliGRAM(s) Oral daily  aspirin  chewable 81 milliGRAM(s) Oral daily  buDESOnide   0.5 milliGRAM(s) Respule 0.5 milliGRAM(s) Inhalation two times a day  cholestyramine Powder (Sugar-Free) 4 Gram(s) Oral two times a day  diltiazem    Tablet 30 milliGRAM(s) Oral every 6 hours  doxazosin 8 milliGRAM(s) Oral at bedtime  finasteride 5 milliGRAM(s) Oral daily  heparin  Injectable 5000 Unit(s) SubCutaneous every 12 hours  isosorbide   mononitrate ER Tablet (IMDUR) 120 milliGRAM(s) Oral daily  metoprolol tartrate 25 milliGRAM(s) Oral two times a day  metroNIDAZOLE  IVPB      metroNIDAZOLE  IVPB 500 milliGRAM(s) IV Intermittent every 8 hours  simvastatin 10 milliGRAM(s) Oral at bedtime  vancomycin    Solution 500 milliGRAM(s) Oral every 6 hours    MEDICATIONS  (PRN):  acetaminophen   Tablet 650 milliGRAM(s) Oral every 8 hours PRN For Temp greater than 38 C (100.4 F)  acetaminophen   Tablet. 650 milliGRAM(s) Oral every 8 hours PRN Mild Pain (1 - 3)  albumin human 25% IVPB 50 milliLiter(s) IV Intermittent <User Schedule> PRN for SBP</=120  ALBUTerol   0.5% 2.5 milliGRAM(s) Nebulizer every 4 hours PRN Shortness of Breath and/or Wheezing  diphenhydrAMINE   Capsule 25 milliGRAM(s) Oral at bedtime PRN sleep  HYDROmorphone  Injectable 0.5 milliGRAM(s) IV Push every 6 hours PRN Moderate Pain (4 - 6)  ondansetron Injectable 4 milliGRAM(s) IV Push every 6 hours PRN Nausea and/or Vomiting      Vital Signs Last 24 Hrs  T(C): 36.3 (16 Jul 2018 08:00), Max: 37.1 (15 Jul 2018 16:00)  T(F): 97.3 (16 Jul 2018 08:00), Max: 98.8 (15 Jul 2018 16:00)  HR: 78 (16 Jul 2018 13:00) (67 - 113)  BP: 112/45 (16 Jul 2018 13:00) (83/43 - 142/42)  BP(mean): 61 (16 Jul 2018 11:10) (51 - 73)  RR: 17 (16 Jul 2018 10:57) (16 - 27)  SpO2: 94% (16 Jul 2018 12:06) (88% - 96%)    Physical Exam:      PE:    Constitutional: frail looking  HEENT: NC/AT, EOMI, PERRLA, conjunctivae clear; ears and nose atraumatic; pharynx clear  Neck: supple; thyroid not palpable  Respiratory: respiratory effort normal; clear to auscultation  Cardiovascular: S1S2 regular, no murmurs  Abdomen: soft, non tender, difusely distended, positive BS; no liver or spleen organomegaly  Genitourinary: no suprapubic tenderness  Musculoskeletal: s/p right lower extremity amputation; left lower extremity with dressing in place  Neurological/ Psychiatric: AxOx3, judgement and insight normal;  moving all extremities  Skin: no rashes; no palpable lesions    Labs: all available labs reviewed                        Labs:                         Labs:                         Labs:                        8.6    9.85  )-----------( 180      ( 16 Jul 2018 05:24 )             27.3     07-16    143  |  108  |  30<H>  ----------------------------<  99  3.2<L>   |  24  |  3.68<H>    Ca    7.5<L>      16 Jul 2018 05:24             Cultures:       Culture - Urine (collected 07-10-18 @ 16:30)  Source: .Urine Catheterized  Final Report (07-11-18 @ 21:32):    <10,000 CFU/ml    Normal Urogenital ravinder present                     Clostridium difficile Toxin by PCR (07.06.18 @ 16:19)    C Diff by PCR Result: Detected    Clostridium difficile Toxin by PCR: The results of this test should be interpreted with consideration of all  clinical and laboratory findings. This test determines the presence of  the C. difficile tcdB gene at a given time and is not intended to  identify antibiotic associated disease or C. difficile infection without  clinical context. Successful treatment is based on the resolution of  clinical symptoms. This test should not be used as a "test of cure"  because C. difficile DNA will persist after successful treatment. Repeat  testing will not be permitted.    This test is performed on the BD MAX system using Real-Time PCR and  fluorogenic target-specific hybridization.        < from: CT Abdomen and Pelvis w/ Oral Cont (07.06.18 @ 18:03) >    IMPRESSION:     Severe pancolitis consistent with pseudomembranous colitis.    Mild pulmonary edema.    Small loculated right and trace layering left pleural effusions.      < end of copied text >          Radiology: all available radiological tests reviewed    Advanced directives addressed: full resuscitation

## 2018-07-16 NOTE — PROGRESS NOTE ADULT - ASSESSMENT
82 y/o M w/pmhx of Afib, CHF, CAD s/p stents, COPD, PNA, upper GI bleed (duodenal)  BIB EMS from [facility] assisted living for fever, abd pain, and diarrhea that began 3 weeks ago. Was in the hospital for PNA tx and was later dx with C-diff and started on a course of PO vancomycin for 10 days for C-diff. Cardiology called for abnormal tele findings, abnormal EKG.     1. Mobitz II- EP consult appreciated. No recurrent events. He will need reevaluation for sleep apnea as outpt. Bradycardia thought to be vagally induced.    2. NSVT, bigeminy- keep K>4, Mag>2. Cont CCB for now. Cont to follow on tele. Echo with normal LV fxn, mod MR.    3. Renal  insufficiency - improving. Today Cr is 3.68. HD as per renal.     4. Abdominal pain - Management pre primary team. Related to CDiff- cont abx. GI following.    5. Atrial fibrillation- off anticoagulation secondary to severe GI bleed in past requiring 15 units of pRBCs.    6. Cont ICU monitoring today. DVT proph.

## 2018-07-16 NOTE — BRIEF OPERATIVE NOTE - PROCEDURE
<<-----Click on this checkbox to enter Procedure Tunneling of dialysis catheter on right side  07/16/2018    Active  [id number]

## 2018-07-16 NOTE — PROGRESS NOTE ADULT - ASSESSMENT
Pseudomembranous colitis status post recent antibiotics    By mouth vancomycin high-dose and Flagyl   case discussed with  family  improving        Patient had a recent course of C. difficile that was treated with vancomycin with a recurrence and he's had C. difficile in the past.  Due to severe C. difficile, would strongly consider a prolonged taper of vancomycin.  For now, would complete two-week course of vancomycin and then would taper her vancomycin over a long time.  Discussed with family.  cont diarrhea and will add cholestyramine and stop iron    A fibrillation with rapid ventricular rhythm, status post Cardizem drip.    Would hold anticoagulation secondary to history bleeding and possible surgery.    COPD obesity, obstructive sleep apnea, coronary artery disease, overall comorbid risk factors that would increase his risk of surgery.    IVF per renal

## 2018-07-16 NOTE — PROGRESS NOTE ADULT - ASSESSMENT
Pt is a 84 y/o M w/pmhx of Afib, CHF, CAD s/p stents, COPD, PNA, upper GI bleed (duodenal), PVD, s/p right lower extremity amputation,  recent hospitalization for pneumonia and subsequent Cdiff colitis admitted on 7/6 from assisted living for evaluation of persistent abdominal pain, fever and diarrhea that has been ongoing despite the po vancomycin course that the patient had been on. The patient also notes mid epigastric/chest pain. History per daughter at bedside and medical record as patient is poor historian.  1. Patient admitted with severe Cdiff pan colitis, also noted with leukocytosis most likely reactive to Cdiff infection  - follow up cultures   - iv hydration and supportive care   - serial cbc and monitor temperature   - day #10 vancomycin po 500 mg po q 6 hours  - iv flagyl day #9  - tolerating antibiotics without rashes or side effects   - GI evaluation in progress  - continue dialysis per nephrology; critical care  - continue contact isolation  - limit antibiotics exposure  2. other issues: Afib, CHF, CAD s/p stents, COPD, PNA, upper GI bleed (duodenal), PVD, s/p right lower extremity amputation  - per medicine

## 2018-07-16 NOTE — PROGRESS NOTE ADULT - SUBJECTIVE AND OBJECTIVE BOX
Patient is a 83y old  Male who presents with a chief complaint of complain of diarrhea, fever (06 Jul 2018 23:55)      HPI:  Pt is a 82 y/o M w/pmhx of Afib, CHF, CAD s/p stents, COPD, PNA, upper GI bleed (duodenal)  BIB EMS from [facility] assisted living for fever, abd pain, and diarrhea that began 3 weeks ago. Was in the hospital for PNA tx and was later dx with C-diff and started on a course of PO vancomycin for 10 days for the C-diff. States that he has had diarrhea since before the course of abx. Pain has been increased for the past few weeks and is characterized as a cramping feeling. Daughters also note that there is increased distension. Also notes chest pain characterized as a cramping in the central chest. 83% O2 sat noted this morning at the assisted living facility. Takes O2 routinely at night but not during the day. (06 Jul 2018 23:55)  Patient's daughter states he was treated for ESBL with antibiotics prior to this  7/10  seen and examined with his dter at bedside  hx of resp failure and intubation 2012 x 3-4 days  feels ok with breathing now  being evaluated for surgical interventions with pancolitis  possibility of post op need for vent support discussed witrh pt and his dtr  he wants to proceed understanding high risk for surgery due to compromised medical condition  hx SHAYY and treated with BIPAP in the past / stopped using it after wt loss  7/11  seen in ICU with dtr at bedside  data discussed with critical care RN  s/p Ross and hemodialysis  still with abd distention  family wants to wait on abd surgery given he is high risk  7/12  family at bedside updated  no cp  no difficulty breathing  intermittent wheezing  7/13  NGT is discontinued  dtr at bedside updated  no issue with breathing now  7/14  having HD  some po intake tolerated ok  dtr at bedside updated  no active pulm issues  7/15  resting comfortably  no distress  Gi and renal eval noted  7/16  feels better  having dialysis  awake and alert  po intake tolerated well  PAST MEDICAL & SURGICAL HISTORY:  Diastolic CHF  Peripheral vascular disease  Afib  Anemia  CKD (chronic kidney disease)  COPD (chronic obstructive pulmonary disease)  SHAYY (obstructive sleep apnea)  Sepsis, due to unspecified organism: 2/2 poorly healing wounds b/l  Dyspepsia: On moderate exertion.  Sleep apnea, obstructive: Requires home 02 therapy, and treatment with BIPAP  Atelectasis  Pleural effusion, bilateral  Respiratory failure  Peripheral edema  CRI (chronic renal insufficiency)  Gout  Benign prostatic hypertrophy  Spinal stenosis  Hypercholesterolemia  GERD (gastroesophageal reflux disease)  CAD (coronary artery disease)  Hypertension  S/P angioplasty with stent  Cataract of left eye  Prostate: Surgery green light procedure.  S/P rotator cuff surgery: Right  S/P angioplasty    MEDICATIONS  (STANDING):  ALBUTerol/ipratropium for Nebulization. 3 milliLiter(s) Nebulizer once  allopurinol 100 milliGRAM(s) Oral daily  buDESOnide   0.5 milliGRAM(s) Respule 0.5 milliGRAM(s) Inhalation two times a day  diltiazem    Tablet 30 milliGRAM(s) Oral every 6 hours  doxazosin 8 milliGRAM(s) Oral at bedtime  ferrous    sulfate 325 milliGRAM(s) Oral daily  finasteride 5 milliGRAM(s) Oral daily  heparin  Injectable 5000 Unit(s) SubCutaneous every 12 hours  isosorbide   mononitrate ER Tablet (IMDUR) 120 milliGRAM(s) Oral daily  metoprolol tartrate 25 milliGRAM(s) Oral two times a day  metroNIDAZOLE  IVPB      metroNIDAZOLE  IVPB 500 milliGRAM(s) IV Intermittent every 8 hours  simvastatin 10 milliGRAM(s) Oral at bedtime  vancomycin    Solution 500 milliGRAM(s) Oral every 6 hours            MEDICATIONS  (PRN):  acetaminophen   Tablet 650 milliGRAM(s) Oral every 8 hours PRN For Temp greater than 38 C (100.4 F)  acetaminophen   Tablet. 650 milliGRAM(s) Oral every 8 hours PRN Mild Pain (1 - 3)  ALBUTerol   0.5% 2.5 milliGRAM(s) Nebulizer every 4 hours PRN Shortness of Breath and/or Wheezing  morphine  - Injectable 2 milliGRAM(s) IV Push every 6 hours PRN Severe Pain (7 - 10)  ondansetron Injectable 4 milliGRAM(s) IV Push every 6 hours PRN Nausea and/or Vomiting      FAMILY HISTORY:  No pertinent family history in first degree relatives    REVIEW OF SYSTEM:  abdominal pain, otherwise 12 point ROS negative     Vital Signs Last 24 Hrs  T(C): 36.3 (16 Jul 2018 08:00), Max: 37.1 (15 Jul 2018 16:00)  T(F): 97.3 (16 Jul 2018 08:00), Max: 98.8 (15 Jul 2018 16:00)  HR: 70 (16 Jul 2018 09:00) (67 - 91)  BP: 101/45 (16 Jul 2018 09:00) (101/45 - 142/42)  BP(mean): 60 (16 Jul 2018 09:00) (51 - 73)  RR: 19 (16 Jul 2018 09:00) (16 - 27)  SpO2: 93% (16 Jul 2018 09:00) (88% - 96%)  PHYSICAL EXAM  General Appearance: cooperative, no acute distress,   HEENT: PERRL, conjunctiva clear, EOM's intact, non injected pharynx, no exudate, TM   normal  Neck: Supple, , no adenopathy, thyroid: not enlarged, no carotid bruit or JVD  Back: Symmetric, no  tenderness,no soft tissue tenderness  Lungs: Diminished bilaterally R>L with some dullness to percussion  Heart: Regular rate and rhythm, S1, S2 normal, no murmur, rub or gallop  Abdomen:  decreased-tender,no active bowel sounds, Distended , no hepatosplenomegaly  Extremities: pos peripheral edema / Hx Right leg amputation  Skin: Skin color, texture normal, no rashes   Neurologic: Alert and oriented X3 , cranial nerves intact, sensory and motor normal,    ECG:    LABS:                        8.4    8.91  )-----------( 181      ( 15 Jul 2018 06:16 )             26.4   07-15    142  |  107  |  23  ----------------------------<  101<H>  3.3<L>   |  26  |  3.04<H>    Ca    7.5<L>      15 Jul 2018 06:16                            8.2    10.15 )-----------( 183      ( 13 Jul 2018 05:14 )             26.1   07-13    143  |  107  |  26<H>  ----------------------------<  89  3.5   |  26  |  2.62<H>    Ca    7.2<L>      13 Jul 2018 05:14  Phos  4.3     07-12  Mg     2.0     07-12    TPro  x   /  Alb  2.0<L>  /  TBili  x   /  DBili  x   /  AST  x   /  ALT  x   /  AlkPhos  x   07-12                            8.3    10.62 )-----------( 202      ( 12 Jul 2018 05:17 )             26.8   07-12    141  |  107  |  33<H>  ----------------------------<  66<L>  3.9   |  23  |  2.84<H>    Ca    7.4<L>      12 Jul 2018 05:17  Phos  4.3     07-12  Mg     2.0     07-12    TPro  x   /  Alb  2.0<L>  /  TBili  x   /  DBili  x   /  AST  x   /  ALT  x   /  AlkPhos  x   07-12                          8.8    20.36 )-----------( 265      ( 10 Jul 2018 05:47 )             28.0   07-11    142  |  108  |  52<H>  ----------------------------<  80  4.0   |  23  |  3.82<H>    Ca    7.0<L>      11 Jul 2018 06:20  Phos  4.9     07-11  Mg     1.9     07-11                              8.1    20.78 )-----------( 239      ( 09 Jul 2018 05:48 )             25.6     07-09    144  |  114<H>  |  65<H>  ----------------------------<  113<H>  4.5   |  17<L>  |  3.90<H>    Ca    7.1<L>      09 Jul 2018 05:48  Mg     1.9     07-09      CARDIAC MARKERS ( 07 Jul 2018 17:51 )  0.024 ng/mL / x     / x     / x     / x                  ABG - ( 08 Jul 2018 15:47 )  pH, Arterial: 7.18  pH, Blood: x     /  pCO2: 39    /  pO2: 68    / HCO3: 14    / Base Excess: -13.1 /  SaO2: 92            < from: Xray Chest 1 View-PORTABLE IMMEDIATE (07.10.18 @ 15:50) >  INTERPRETATION:  CLINICAL INDICATION:  R  I J dialysis line placement    TECHNIQUE: Single view AP chest radiograph was performed.    COMPARISON:  Chest radiograph performed earlier on the same day,   7/10/2018 at 8:28 AM and chest radiograph dated 7/6/2018.    FINDINGS:    Interval placement of right-sided jugular central venous catheter with   tip projecting over the right atrium. No evidence forpneumothorax.    There is persistent mild pulmonary vascular congestion. There is trace   fluid within the right minor fissure.    The cardiac silhouette size is stable. Diffuse aortic calcifications are   noted    Redemonstration of anchor screw within the right humeral head.     IMPRESSION:    Interval placement of right-sided IJ CVC, with tip projecting over the   right atrium. No pneumothorax.     < end of copied text >          RADIOLOGY & ADDITIONAL STUDIES:  IMPRESSION:     Severe pancolitis consistent with pseudomembranous colitis.    Mild pulmonary edema.    Small loculated right and trace layering left pleural effusions.

## 2018-07-16 NOTE — PROGRESS NOTE ADULT - SUBJECTIVE AND OBJECTIVE BOX
No acute overnight events. Francisco Javier solid food though small amounts. Denies abdominal pain.    MEDICATIONS  (STANDING):  ALBUTerol/ipratropium for Nebulization. 3 milliLiter(s) Nebulizer once  allopurinol 100 milliGRAM(s) Oral daily  aspirin  chewable 81 milliGRAM(s) Oral daily  buDESOnide   0.5 milliGRAM(s) Respule 0.5 milliGRAM(s) Inhalation two times a day  cholestyramine Powder (Sugar-Free) 4 Gram(s) Oral two times a day  diltiazem    Tablet 30 milliGRAM(s) Oral every 6 hours  doxazosin 8 milliGRAM(s) Oral at bedtime  finasteride 5 milliGRAM(s) Oral daily  heparin  Injectable 5000 Unit(s) SubCutaneous every 12 hours  isosorbide   mononitrate ER Tablet (IMDUR) 120 milliGRAM(s) Oral daily  metoprolol tartrate 25 milliGRAM(s) Oral two times a day  metroNIDAZOLE  IVPB      metroNIDAZOLE  IVPB 500 milliGRAM(s) IV Intermittent every 8 hours  simvastatin 10 milliGRAM(s) Oral at bedtime  vancomycin    Solution 500 milliGRAM(s) Oral every 6 hours    MEDICATIONS  (PRN):  acetaminophen   Tablet 650 milliGRAM(s) Oral every 8 hours PRN For Temp greater than 38 C (100.4 F)  acetaminophen   Tablet. 650 milliGRAM(s) Oral every 8 hours PRN Mild Pain (1 - 3)  albumin human 25% IVPB 50 milliLiter(s) IV Intermittent <User Schedule> PRN for SBP</=120  albumin human 25% IVPB 50 milliLiter(s) IV Intermittent every 1 hour PRN for SBP,120  ALBUTerol   0.5% 2.5 milliGRAM(s) Nebulizer every 4 hours PRN Shortness of Breath and/or Wheezing  diphenhydrAMINE   Capsule 25 milliGRAM(s) Oral at bedtime PRN sleep  HYDROmorphone  Injectable 0.5 milliGRAM(s) IV Push every 6 hours PRN Moderate Pain (4 - 6)  ondansetron Injectable 4 milliGRAM(s) IV Push every 6 hours PRN Nausea and/or Vomiting    Vital Signs Last 24 Hrs  T(C): 36.3 (16 Jul 2018 08:00), Max: 37.1 (15 Jul 2018 16:00)  T(F): 97.3 (16 Jul 2018 08:00), Max: 98.8 (15 Jul 2018 16:00)  HR: 70 (16 Jul 2018 09:00) (67 - 91)  BP: 101/45 (16 Jul 2018 09:00) (101/45 - 142/42)  BP(mean): 60 (16 Jul 2018 09:00) (51 - 73)  RR: 19 (16 Jul 2018 09:00) (16 - 27)  SpO2: 93% (16 Jul 2018 09:00) (88% - 96%)  I&O's Detail    15 Jul 2018 07:01  -  16 Jul 2018 07:00  --------------------------------------------------------  IN:    IV PiggyBack: 100 mL    Oral Fluid: 240 mL  Total IN: 340 mL    OUT:    Rectal Tube: 250 mL  Total OUT: 250 mL    Total NET: 90 mL    NAD  ABD exam :soft, NT, distended, no peritoneal signs                        8.6    9.85  )-----------( 180      ( 16 Jul 2018 05:24 )             27.3     07-16    143  |  108  |  30<H>  ----------------------------<  99  3.2<L>   |  24  |  3.68<H>    Ca    7.5<L>      16 Jul 2018 05:24

## 2018-07-16 NOTE — PROGRESS NOTE ADULT - SUBJECTIVE AND OBJECTIVE BOX
NEPHROLOGY INTERVAL HPI/OVERNIGHT EVENTS:  seen at HD   no c/o   tolerating po  still with no UOP   diarrhea slowing down      MEDICATIONS  (STANDING):  ALBUTerol/ipratropium for Nebulization. 3 milliLiter(s) Nebulizer once  allopurinol 100 milliGRAM(s) Oral daily  aspirin  chewable 81 milliGRAM(s) Oral daily  buDESOnide   0.5 milliGRAM(s) Respule 0.5 milliGRAM(s) Inhalation two times a day  cholestyramine Powder (Sugar-Free) 4 Gram(s) Oral two times a day  diltiazem    Tablet 30 milliGRAM(s) Oral every 6 hours  doxazosin 8 milliGRAM(s) Oral at bedtime  finasteride 5 milliGRAM(s) Oral daily  heparin  Injectable 5000 Unit(s) SubCutaneous every 12 hours  isosorbide   mononitrate ER Tablet (IMDUR) 120 milliGRAM(s) Oral daily  metoprolol tartrate 25 milliGRAM(s) Oral two times a day  metroNIDAZOLE  IVPB      metroNIDAZOLE  IVPB 500 milliGRAM(s) IV Intermittent every 8 hours  simvastatin 10 milliGRAM(s) Oral at bedtime  vancomycin    Solution 500 milliGRAM(s) Oral every 6 hours    MEDICATIONS  (PRN):  acetaminophen   Tablet 650 milliGRAM(s) Oral every 8 hours PRN For Temp greater than 38 C (100.4 F)  acetaminophen   Tablet. 650 milliGRAM(s) Oral every 8 hours PRN Mild Pain (1 - 3)  albumin human 25% IVPB 50 milliLiter(s) IV Intermittent <User Schedule> PRN for SBP</=120  ALBUTerol   0.5% 2.5 milliGRAM(s) Nebulizer every 4 hours PRN Shortness of Breath and/or Wheezing  diphenhydrAMINE   Capsule 25 milliGRAM(s) Oral at bedtime PRN sleep  HYDROmorphone  Injectable 0.5 milliGRAM(s) IV Push every 6 hours PRN Moderate Pain (4 - 6)  ondansetron Injectable 4 milliGRAM(s) IV Push every 6 hours PRN Nausea and/or Vomiting      Allergies    Zosyn (Rash)    Intolerances        I&O's Detail    15 Jul 2018 07:01  -  16 Jul 2018 07:00  --------------------------------------------------------  IN:    IV PiggyBack: 100 mL    Oral Fluid: 240 mL  Total IN: 340 mL    OUT:    Rectal Tube: 250 mL  Total OUT: 250 mL    Total NET: 90 mL          .    Patient was seen and evaluated on dialysis.   Patient is tolerating the procedure well.   Continue dialysis:   Dialyzer:  revaclear with bfr of 350 on 4k with uf goal of 1 kg    Vital Signs Last 24 Hrs  T(C): 36.3 (16 Jul 2018 08:00), Max: 37.1 (15 Jul 2018 16:00)  T(F): 97.3 (16 Jul 2018 08:00), Max: 98.8 (15 Jul 2018 16:00)  HR: 78 (16 Jul 2018 13:00) (67 - 113)  BP: 112/45 (16 Jul 2018 13:00) (83/43 - 142/42)  BP(mean): 61 (16 Jul 2018 11:10) (51 - 73)  RR: 17 (16 Jul 2018 10:57) (16 - 27)  SpO2: 94% (16 Jul 2018 12:06) (88% - 96%)  Daily     Daily     PHYSICAL EXAM:  General: alert. awake Ox3  HEENT: MMM  CV: s1s2 rrr  LUNGS: B/L CTA  EXT: no edema    LABS:                        8.6    9.85  )-----------( 180      ( 16 Jul 2018 05:24 )             27.3     07-16    143  |  108  |  30<H>  ----------------------------<  99  3.2<L>   |  24  |  3.68<H>    Ca    7.5<L>      16 Jul 2018 05:24      PT/INR - ( 16 Jul 2018 12:10 )   PT: 12.7 sec;   INR: 1.17 ratio         PTT - ( 16 Jul 2018 12:10 )  PTT:28.2 sec

## 2018-07-16 NOTE — PROGRESS NOTE ADULT - ASSESSMENT
84 y/o wm with hx of ckd stage 3 ( scr 1.5-1.7) with ARYA due to volume depletion from CDiff associated colitis and diarrhea in presence of diuretic use PTA.  Now with non anion gap metabolic acidosis and worsening renal parameters.  CXR with mild CHF with hx of diastolic CHF.    PLAN  - obtain abd and lactate  - will change ivf and add bicarbonate and keep at current rate  - hold lasix done.   - fu uop and scr closely and will monitor respiratory status  - bp and hr stable now, cardizem adjusted and lopressor added    7/9 MK  - ARYA: worsening renal parameters with metabolic acidosis, non ag.  Previous lactate WNL and since placed on bicarbonate drip  fu repeat abg today.  Increase IVF rate  possibility of HD discussed with enio, hcp daughter, pt has been agreeable in past.   DC hydralazine  dc morphine and change to dilaudid  - Cdiff with rising scr and worsening abd distension: CRS consult ongoing  possible repeat ct scan  DW Dr ballesteros    7/10  Anuric  anasarca  Non anion gap acidosis on bicarb drip  ARYA, anuric  CKD 3 history  d/w Family, and pt agreeable  called ICU for line placement and to dialyze the pt in ICU for monitoring    7/10  HD initiated via R temp HD catheter  tolerating well  no complaints  good abf    7/11 SY  --ARYA with Anasarca.  Urine output slightly improved.  However, remains quite fluid overloaded.  Will plan to continue HD until fluid status is much improved.  HD order written.  3 hour tx today.  Will aim to remove 2.5 kg as tolerated.  --C DIff colitis ; continue to monitor closely.    7/12 SY  --ARYA with Anasarca.   Remains Oligo-anuric.    --HD with goal of 2.5 kg UF again today.  --C Diff colitis.   Clinically improved   Continue to monitor.    7/13  The patient was dialyzed 3 days in a row this week  Discussed with the patient's daughter and hospitalist, intensivist  Will skip dialysis today, no urgent need for dialysis  We'll dialyze the patient tomorrow on Saturday and then skip Sunday to dialyze the patient again on Monday.    7/14  We'll dialyze against 4 K bath  Case discussed with the patient's daughter  May need a permanent catheter by Monday      7/15  Dialyze with a 4K bath  Potassium is 3.3 most likely from the diarrhea  Schedule for permacath placement after dialysis on Monday or Tuesday  Patient is comfortable no complications noted at this time    7/16 MK  - ARYA/ CKD stage 3 remains oliguirc and HD dependent.  LImited UF at HD toleated with the   hypokalemia corrected with HD.  Permcath planned today  - Cdiff: on PO vanc.  improved leukocytosis and abd distension

## 2018-07-16 NOTE — PROGRESS NOTE ADULT - SUBJECTIVE AND OBJECTIVE BOX
· Subjective and Objective: 	  Events Overnight: having some diarrhea, feels a little better, creatinine inc from 2.6 to 3.2, yesterday    HPI:     Pt is a 84 y/o M w/pmhx of Afib, CHF, CAD s/p stents, COPD, PNA, upper GI bleed (duodenal)  BIB EMS from [facility] assisted living for fever, abd pain, and diarrhea that began 3 weeks ago. Was in the hospital for PNA tx and was later dx with C-diff and started on a course of PO vancomycin for 10 days for the C-diff. saw admiited with c. dif, developed renal failure, on dialysis  on po vanco and iv flagyl, wbc better no fever, distention a little better    7/15- report still some abdominal distention and minimal tenderness to abdomen on palpation but much improved from before  7/16- still with abdomianl distention but soft ,nontender still with rectal tube , saturating 94% on 3 L nasal cannula   reports chest pain with hypoxia  post permacath procedure  Constitutional: NAD  HEENT: Atraumatic, DEBBIE, Normal, No congestion  Respiratory: decreased breath sounds B/l lung bases  Cardiovascular: N S1S2;   Gastrointestinal: Abdomen soft,distended, no rebound, no guarding  Extremities: No edema,   Neurological: AAO x 3,      Musculoskeletal: non tender  Breasts: Deferred      PHYSICAL EXAM:    Daily     Daily     ICU Vital Signs Last 24 Hrs  T(C): 37.1 (16 Jul 2018 17:00), Max: 37.1 (16 Jul 2018 00:00)  T(F): 98.8 (16 Jul 2018 17:00), Max: 98.8 (16 Jul 2018 00:00)  HR: 63 (16 Jul 2018 17:30) (63 - 113)  BP: 95/44 (16 Jul 2018 17:15) (83/43 - 142/42)  BP(mean): 57 (16 Jul 2018 17:15) (49 - 73)  ABP: --  ABP(mean): --  RR: 16 (16 Jul 2018 17:30) (16 - 28)  SpO2: 93% (16 Jul 2018 17:30) (87% - 96%)                              8.6    9.85  )-----------( 180      ( 16 Jul 2018 05:24 )             27.3       CBC Full  -  ( 16 Jul 2018 05:24 )  WBC Count : 9.85 K/uL  Hemoglobin : 8.6 g/dL  Hematocrit : 27.3 %  Platelet Count - Automated : 180 K/uL  Mean Cell Volume : 90.1 fl  Mean Cell Hemoglobin : 28.4 pg  Mean Cell Hemoglobin Concentration : 31.5 gm/dL  Auto Neutrophil # : 8.17 K/uL  Auto Lymphocyte # : 0.61 K/uL  Auto Monocyte # : 0.69 K/uL  Auto Eosinophil # : 0.18 K/uL  Auto Basophil # : 0.03 K/uL  Auto Neutrophil % : 83.0 %  Auto Lymphocyte % : 6.2 %  Auto Monocyte % : 7.0 %  Auto Eosinophil % : 1.8 %  Auto Basophil % : 0.3 %      07-16    143  |  108  |  30<H>  ----------------------------<  99  3.2<L>   |  24  |  3.68<H>    Ca    7.5<L>      16 Jul 2018 05:24            PT/INR - ( 16 Jul 2018 12:10 )   PT: 12.7 sec;   INR: 1.17 ratio         PTT - ( 16 Jul 2018 12:10 )  PTT:28.2 sec                  MEDICATIONS  (STANDING):  ALBUTerol/ipratropium for Nebulization. 3 milliLiter(s) Nebulizer once  allopurinol 100 milliGRAM(s) Oral daily  aspirin  chewable 81 milliGRAM(s) Oral daily  buDESOnide   0.5 milliGRAM(s) Respule 0.5 milliGRAM(s) Inhalation two times a day  cholestyramine Powder (Sugar-Free) 4 Gram(s) Oral two times a day  diltiazem    Tablet 30 milliGRAM(s) Oral every 6 hours  doxazosin 8 milliGRAM(s) Oral at bedtime  famotidine Injectable 20 milliGRAM(s) IV Push once  finasteride 5 milliGRAM(s) Oral daily  heparin  Injectable 5000 Unit(s) SubCutaneous every 12 hours  isosorbide   mononitrate ER Tablet (IMDUR) 120 milliGRAM(s) Oral daily  metoprolol tartrate 25 milliGRAM(s) Oral two times a day  metroNIDAZOLE  IVPB      metroNIDAZOLE  IVPB 500 milliGRAM(s) IV Intermittent every 8 hours  simvastatin 10 milliGRAM(s) Oral at bedtime  vancomycin    Solution 500 milliGRAM(s) Oral every 6 hours

## 2018-07-17 LAB
ANION GAP SERPL CALC-SCNC: 9 MMOL/L — SIGNIFICANT CHANGE UP (ref 5–17)
ANISOCYTOSIS BLD QL: SLIGHT — SIGNIFICANT CHANGE UP
BASO STIPL BLD QL SMEAR: PRESENT — SIGNIFICANT CHANGE UP
BASOPHILS # BLD AUTO: 0.01 K/UL — SIGNIFICANT CHANGE UP (ref 0–0.2)
BASOPHILS NFR BLD AUTO: 0.1 % — SIGNIFICANT CHANGE UP (ref 0–2)
BUN SERPL-MCNC: 19 MG/DL — SIGNIFICANT CHANGE UP (ref 7–23)
CALCIUM SERPL-MCNC: 7.2 MG/DL — LOW (ref 8.5–10.1)
CHLORIDE SERPL-SCNC: 108 MMOL/L — SIGNIFICANT CHANGE UP (ref 96–108)
CO2 SERPL-SCNC: 25 MMOL/L — SIGNIFICANT CHANGE UP (ref 22–31)
CREAT SERPL-MCNC: 3.02 MG/DL — HIGH (ref 0.5–1.3)
ELLIPTOCYTES BLD QL SMEAR: SLIGHT — SIGNIFICANT CHANGE UP
EOSINOPHIL # BLD AUTO: 0.32 K/UL — SIGNIFICANT CHANGE UP (ref 0–0.5)
EOSINOPHIL NFR BLD AUTO: 3.2 % — SIGNIFICANT CHANGE UP (ref 0–6)
GLUCOSE SERPL-MCNC: 89 MG/DL — SIGNIFICANT CHANGE UP (ref 70–99)
HCT VFR BLD CALC: 26.8 % — LOW (ref 39–50)
HGB BLD-MCNC: 8.4 G/DL — LOW (ref 13–17)
HYPOCHROMIA BLD QL: SLIGHT — SIGNIFICANT CHANGE UP
IMM GRANULOCYTES NFR BLD AUTO: 0.8 % — SIGNIFICANT CHANGE UP (ref 0–1.5)
LYMPHOCYTES # BLD AUTO: 0.48 K/UL — LOW (ref 1–3.3)
LYMPHOCYTES # BLD AUTO: 4.7 % — LOW (ref 13–44)
MACROCYTES BLD QL: SLIGHT — SIGNIFICANT CHANGE UP
MANUAL SMEAR VERIFICATION: SIGNIFICANT CHANGE UP
MCHC RBC-ENTMCNC: 28 PG — SIGNIFICANT CHANGE UP (ref 27–34)
MCHC RBC-ENTMCNC: 31.3 GM/DL — LOW (ref 32–36)
MCV RBC AUTO: 89.3 FL — SIGNIFICANT CHANGE UP (ref 80–100)
MICROCYTES BLD QL: SLIGHT — SIGNIFICANT CHANGE UP
MONOCYTES # BLD AUTO: 0.8 K/UL — SIGNIFICANT CHANGE UP (ref 0–0.9)
MONOCYTES NFR BLD AUTO: 7.9 % — SIGNIFICANT CHANGE UP (ref 2–14)
NEUTROPHILS # BLD AUTO: 8.44 K/UL — HIGH (ref 1.8–7.4)
NEUTROPHILS NFR BLD AUTO: 83.3 % — HIGH (ref 43–77)
NRBC # BLD: 0 /100 WBCS — SIGNIFICANT CHANGE UP (ref 0–0)
OVALOCYTES BLD QL SMEAR: SLIGHT — SIGNIFICANT CHANGE UP
PLAT MORPH BLD: NORMAL — SIGNIFICANT CHANGE UP
PLATELET # BLD AUTO: 199 K/UL — SIGNIFICANT CHANGE UP (ref 150–400)
POIKILOCYTOSIS BLD QL AUTO: SLIGHT — SIGNIFICANT CHANGE UP
POLYCHROMASIA BLD QL SMEAR: SLIGHT — SIGNIFICANT CHANGE UP
POTASSIUM SERPL-MCNC: 3.4 MMOL/L — LOW (ref 3.5–5.3)
POTASSIUM SERPL-SCNC: 3.4 MMOL/L — LOW (ref 3.5–5.3)
RBC # BLD: 3 M/UL — LOW (ref 4.2–5.8)
RBC # FLD: 20.1 % — HIGH (ref 10.3–14.5)
RBC BLD AUTO: ABNORMAL
SODIUM SERPL-SCNC: 142 MMOL/L — SIGNIFICANT CHANGE UP (ref 135–145)
WBC # BLD: 10.13 K/UL — SIGNIFICANT CHANGE UP (ref 3.8–10.5)
WBC # FLD AUTO: 10.13 K/UL — SIGNIFICANT CHANGE UP (ref 3.8–10.5)

## 2018-07-17 RX ORDER — POTASSIUM CHLORIDE 20 MEQ
10 PACKET (EA) ORAL
Qty: 0 | Refills: 0 | Status: COMPLETED | OUTPATIENT
Start: 2018-07-17 | End: 2018-07-17

## 2018-07-17 RX ORDER — HYDROMORPHONE HYDROCHLORIDE 2 MG/ML
0.5 INJECTION INTRAMUSCULAR; INTRAVENOUS; SUBCUTANEOUS EVERY 6 HOURS
Qty: 0 | Refills: 0 | Status: DISCONTINUED | OUTPATIENT
Start: 2018-07-17 | End: 2018-07-23

## 2018-07-17 RX ADMIN — ISOSORBIDE MONONITRATE 120 MILLIGRAM(S): 60 TABLET, EXTENDED RELEASE ORAL at 12:16

## 2018-07-17 RX ADMIN — HYDROMORPHONE HYDROCHLORIDE 0.5 MILLIGRAM(S): 2 INJECTION INTRAMUSCULAR; INTRAVENOUS; SUBCUTANEOUS at 09:30

## 2018-07-17 RX ADMIN — FINASTERIDE 5 MILLIGRAM(S): 5 TABLET, FILM COATED ORAL at 12:16

## 2018-07-17 RX ADMIN — Medication 100 MILLIGRAM(S): at 12:18

## 2018-07-17 RX ADMIN — Medication 500 MILLIGRAM(S): at 17:34

## 2018-07-17 RX ADMIN — HEPARIN SODIUM 5000 UNIT(S): 5000 INJECTION INTRAVENOUS; SUBCUTANEOUS at 17:34

## 2018-07-17 RX ADMIN — Medication 0.5 MILLIGRAM(S): at 09:16

## 2018-07-17 RX ADMIN — Medication 81 MILLIGRAM(S): at 12:16

## 2018-07-17 RX ADMIN — HEPARIN SODIUM 5000 UNIT(S): 5000 INJECTION INTRAVENOUS; SUBCUTANEOUS at 06:37

## 2018-07-17 RX ADMIN — SIMVASTATIN 10 MILLIGRAM(S): 20 TABLET, FILM COATED ORAL at 21:44

## 2018-07-17 RX ADMIN — Medication 500 MILLIGRAM(S): at 12:16

## 2018-07-17 RX ADMIN — CHOLESTYRAMINE 4 GRAM(S): 4 POWDER, FOR SUSPENSION ORAL at 21:44

## 2018-07-17 RX ADMIN — Medication 0.5 MILLIGRAM(S): at 21:13

## 2018-07-17 RX ADMIN — HYDROMORPHONE HYDROCHLORIDE 0.5 MILLIGRAM(S): 2 INJECTION INTRAMUSCULAR; INTRAVENOUS; SUBCUTANEOUS at 09:18

## 2018-07-17 RX ADMIN — Medication 500 MILLIGRAM(S): at 06:37

## 2018-07-17 RX ADMIN — Medication 100 MILLIEQUIVALENT(S): at 12:21

## 2018-07-17 RX ADMIN — Medication 100 MILLIGRAM(S): at 06:37

## 2018-07-17 RX ADMIN — Medication 100 MILLIEQUIVALENT(S): at 17:14

## 2018-07-17 RX ADMIN — Medication 25 MILLIGRAM(S): at 17:41

## 2018-07-17 RX ADMIN — Medication 8 MILLIGRAM(S): at 22:05

## 2018-07-17 RX ADMIN — CHOLESTYRAMINE 4 GRAM(S): 4 POWDER, FOR SUSPENSION ORAL at 09:10

## 2018-07-17 NOTE — PROGRESS NOTE ADULT - ASSESSMENT
1) Clostridium Difficile Colitis clinically improved  2) Metabolic Acidosis  3) Restrictive Ventilatory Disease  4) SHAYY  5) Bilateral Pleural Effusions  6) Renal failure on dilaysis now  7) Leukocytosis is clinically improved  8)s/p shiley placed    82 y/o M w/pmhx of Afib, CHF, CAD s/p stents, COPD, PNA, upper GI bleed (duodenal)  BIB EMS from [facility] assisted living for fever, abd pain, and diarrhea that began 3 weeks ago. Was in the hospital for ESBL and was later dx with C-diff and started on a course of PO vancomycin for 10 days for the C-diff. At the present time has diffuse abdominal pain, being treated Vancomycin and IV Metronidazole  ABG reviewed and reveals 7.18/39/68, LA normal limits  Etiology likely from sepsis   repeat ABG noted  ABG - ( 09 Jul 2018 11:50 )  pH, Arterial: 7.21  pH, Blood: x     /  pCO2: 42    /  pO2: 67    / HCO3: 16    / Base Excess: -10.6 /  SaO2: 91      repeat cxr noted  At the present time, patient states he would like to be a full code  Continue monitoring hemodynamics (daughter states baseline diastolic tends to run between 25-40mmHg)   Monitor urine output aggressively   Used BIPAP/CPAP in the past (stopped as an outpatient after patient lost weight), may worsen intraabdominal pressures but if arrhythmias are present, may consider starting again.   Appreciate nephrology/cardiology/ID/GI/hospitalist recommendations  Will continue to monitor   hx of resp failure and intubation 2012 x 3-4 days  feels ok with breathing now  being evaluated for surgical interventions with pancolitis  possibility of post op need for vent support discussed with pt and his dtr  he wants to proceed understanding high risk for surgery due to compromised medical condition  no need for thoracentesis    s/p Shiley and hemodialysis  still with abd distention but NGT is now discontinued and tolerated po yesterday  overall prognosis remains guarded  improving clinically with decreased leukocytosis  tolerating PO intake, monitor clinically  may need permannt cath placement tomorrow for dialysis  pulm status appears optimized for low risk procedure  fu cxr afterwards needed  hx SHAYY and treated with BIPAP in the past / stopped using it after wt loss  Thank you for the consult

## 2018-07-17 NOTE — PROGRESS NOTE ADULT - ASSESSMENT
Pseudomembranous colitis status post recent antibiotics    By mouth vancomycin high-dose and Flagyl   case discussed with  family  improving        Patient had a recent course of C. difficile that was treated with vancomycin with a recurrence and he's had C. difficile in the past.  Due to severe C. difficile, would strongly consider a prolonged taper of vancomycin.  For now, would complete two-week course of vancomycin and then would taper her vancomycin over a long time.  Discussed with family.  cont diarrhea and will add cholestyramine and stop iron    A fibrillation with rapid ventricular rhythm, status post Cardizem drip.    Would hold anticoagulation secondary to history bleeding    COPD obesity, obstructive sleep apnea, coronary artery disease, overall comorbid risk factors that would increase his risk of surgery.    IVF per renal  HD as needed

## 2018-07-17 NOTE — PROGRESS NOTE ADULT - SUBJECTIVE AND OBJECTIVE BOX
Patient is a 83y old  Male who presents with a chief complaint of complain of diarrhea, fever (06 Jul 2018 23:55)    Date of service: 07-17-18 @ 12:56    Lying in bed  Stool more formed  No complaints        ROS: no fever or chills; denies dizziness, no HA, no SOB or cough, no abdominal pain, no diarrhea or constipation; no dysuria, no urinary frequency, no legs pain, no rashes    MEDICATIONS  (STANDING):  allopurinol 100 milliGRAM(s) Oral daily  aspirin  chewable 81 milliGRAM(s) Oral daily  buDESOnide   0.5 milliGRAM(s) Respule 0.5 milliGRAM(s) Inhalation two times a day  cholestyramine Powder (Sugar-Free) 4 Gram(s) Oral two times a day  diltiazem    Tablet 30 milliGRAM(s) Oral every 6 hours  doxazosin 8 milliGRAM(s) Oral at bedtime  finasteride 5 milliGRAM(s) Oral daily  heparin  Injectable 5000 Unit(s) SubCutaneous every 12 hours  isosorbide   mononitrate ER Tablet (IMDUR) 120 milliGRAM(s) Oral daily  metoprolol tartrate 25 milliGRAM(s) Oral two times a day  potassium chloride  10 mEq/100 mL IVPB 10 milliEquivalent(s) IV Intermittent every 1 hour  simvastatin 10 milliGRAM(s) Oral at bedtime  vancomycin    Solution 500 milliGRAM(s) Oral every 6 hours    MEDICATIONS  (PRN):  acetaminophen   Tablet 650 milliGRAM(s) Oral every 8 hours PRN For Temp greater than 38 C (100.4 F)  acetaminophen   Tablet. 650 milliGRAM(s) Oral every 8 hours PRN Mild Pain (1 - 3)  albumin human 25% IVPB 50 milliLiter(s) IV Intermittent <User Schedule> PRN for SBP</=120  ALBUTerol   0.5% 2.5 milliGRAM(s) Nebulizer every 4 hours PRN Shortness of Breath and/or Wheezing  diphenhydrAMINE   Capsule 25 milliGRAM(s) Oral at bedtime PRN sleep  HYDROmorphone  Injectable 0.5 milliGRAM(s) IV Push every 6 hours PRN Severe Pain (7 - 10)  ondansetron Injectable 4 milliGRAM(s) IV Push every 6 hours PRN Nausea and/or Vomiting      Vital Signs Last 24 Hrs  T(C): 37.3 (17 Jul 2018 08:00), Max: 37.3 (17 Jul 2018 08:00)  T(F): 99.1 (17 Jul 2018 08:00), Max: 99.1 (17 Jul 2018 08:00)  HR: 75 (17 Jul 2018 09:00) (63 - 115)  BP: 94/34 (17 Jul 2018 09:00) (94/34 - 143/41)  BP(mean): 49 (17 Jul 2018 09:00) (49 - 110)  RR: 20 (17 Jul 2018 09:00) (0 - 28)  SpO2: 91% (17 Jul 2018 09:00) (87% - 99%)    Physical Exam:          PE:    Constitutional: frail looking  HEENT: NC/AT, EOMI, PERRLA, conjunctivae clear; ears and nose atraumatic; pharynx clear  Neck: supple; thyroid not palpable  Respiratory: respiratory effort normal; clear to auscultation  Cardiovascular: S1S2 regular, no murmurs  Abdomen: soft, non tender, difusely distended, positive BS; no liver or spleen organomegaly  Genitourinary: no suprapubic tenderness  Musculoskeletal: s/p right lower extremity amputation; left lower extremity with dressing in place  Neurological/ Psychiatric: AxOx3, judgement and insight normal;  moving all extremities  Skin: no rashes; no palpable lesions    Labs: all available labs reviewed                        Labs:                         Labs:                         Labs:                        8.6    9.85  )-----------( 180      ( 16 Jul 2018 05:24 )             27.3     07-16    143  |  108  |  30<H>  ----------------------------<  99  3.2<L>   |  24  |  3.68<H>    Ca    7.5<L>      16 Jul 2018 05:24             Cultures:       Culture - Urine (collected 07-10-18 @ 16:30)  Source: .Urine Catheterized  Final Report (07-11-18 @ 21:32):    <10,000 CFU/ml    Normal Urogenital ravinder present                     Clostridium difficile Toxin by PCR (07.06.18 @ 16:19)    C Diff by PCR Result: Detected    Clostridium difficile Toxin by PCR: The results of this test should be interpreted with consideration of all  clinical and laboratory findings. This test determines the presence of  the C. difficile tcdB gene at a given time and is not intended to  identify antibiotic associated disease or C. difficile infection without  clinical context. Successful treatment is based on the resolution of  clinical symptoms. This test should not be used as a "test of cure"  because C. difficile DNA will persist after successful treatment. Repeat  testing will not be permitted.    This test is performed on the BD MAX system using Real-Time PCR and  fluorogenic target-specific hybridization.        < from: CT Abdomen and Pelvis w/ Oral Cont (07.06.18 @ 18:03) >    IMPRESSION:     Severe pancolitis consistent with pseudomembranous colitis.    Mild pulmonary edema.    Small loculated right and trace layering left pleural effusions.      < end of copied text >          Radiology: all available radiological tests reviewed    Advanced directives addressed: full resuscitation

## 2018-07-17 NOTE — PROGRESS NOTE ADULT - SUBJECTIVE AND OBJECTIVE BOX
· Subjective and Objective: 	  Events Overnight: having some diarrhea, feels a little better, creatinine inc from 2.6 to 3.2, yesterday    HPI:     Pt is a 82 y/o M w/pmhx of Afib, CHF, CAD s/p stents, COPD, PNA, upper GI bleed (duodenal)  BIB EMS from [facility] assisted living for fever, abd pain, and diarrhea that began 3 weeks ago. Was in the hospital for PNA tx and was later dx with C-diff and started on a course of PO vancomycin for 10 days for the C-diff. saw admiited with c. dif, developed renal failure, on dialysis  on po vanco and iv flagyl, wbc better no fever, distention a little better    7/15- report still some abdominal distention and minimal tenderness to abdomen on palpation but much improved from before  7/16- still with abdomianl distention but soft ,nontender still with rectal tube , saturating 94% on 3 L nasal cannula   reports chest pain with hypoxia  post permacath procedure  7/17- no complaints, no cp ,no sob   Constitutional: NAD  HEENT: Atraumatic, DEBBIE, Normal, No congestion  Respiratory: decreased breath sounds B/l lung bases  Cardiovascular: N S1S2;   Gastrointestinal: Abdomen soft,distended, no rebound, no guarding, still with rectal tube   Extremities: No edema,   Neurological: AAO x 3,      Musculoskeletal: non tender  Breasts: Deferred      PHYSICAL EXAM:    Daily     Daily     ICU Vital Signs Last 24 Hrs  T(C): 37.3 (17 Jul 2018 13:00), Max: 37.3 (17 Jul 2018 08:00)  T(F): 99.1 (17 Jul 2018 13:00), Max: 99.1 (17 Jul 2018 08:00)  HR: 69 (17 Jul 2018 17:00) (65 - 99)  BP: 129/47 (17 Jul 2018 17:00) (94/34 - 143/41)  BP(mean): 67 (17 Jul 2018 17:00) (45 - 110)  ABP: --  ABP(mean): --  RR: 17 (17 Jul 2018 17:00) (0 - 21)  SpO2: 94% (17 Jul 2018 17:00) (89% - 99%)                                8.4    10.13 )-----------( 199      ( 17 Jul 2018 05:41 )             26.8       CBC Full  -  ( 17 Jul 2018 05:41 )  WBC Count : 10.13 K/uL  Hemoglobin : 8.4 g/dL  Hematocrit : 26.8 %  Platelet Count - Automated : 199 K/uL  Mean Cell Volume : 89.3 fl  Mean Cell Hemoglobin : 28.0 pg  Mean Cell Hemoglobin Concentration : 31.3 gm/dL  Auto Neutrophil # : 8.44 K/uL  Auto Lymphocyte # : 0.48 K/uL  Auto Monocyte # : 0.80 K/uL  Auto Eosinophil # : 0.32 K/uL  Auto Basophil # : 0.01 K/uL  Auto Neutrophil % : 83.3 %  Auto Lymphocyte % : 4.7 %  Auto Monocyte % : 7.9 %  Auto Eosinophil % : 3.2 %  Auto Basophil % : 0.1 %      07-17    142  |  108  |  19  ----------------------------<  89  3.4<L>   |  25  |  3.02<H>    Ca    7.2<L>      17 Jul 2018 05:41            PT/INR - ( 16 Jul 2018 12:10 )   PT: 12.7 sec;   INR: 1.17 ratio         PTT - ( 16 Jul 2018 12:10 )  PTT:28.2 sec                  MEDICATIONS  (STANDING):  allopurinol 100 milliGRAM(s) Oral daily  aspirin  chewable 81 milliGRAM(s) Oral daily  buDESOnide   0.5 milliGRAM(s) Respule 0.5 milliGRAM(s) Inhalation two times a day  cholestyramine Powder (Sugar-Free) 4 Gram(s) Oral two times a day  diltiazem    Tablet 30 milliGRAM(s) Oral every 6 hours  doxazosin 8 milliGRAM(s) Oral at bedtime  finasteride 5 milliGRAM(s) Oral daily  heparin  Injectable 5000 Unit(s) SubCutaneous every 12 hours  isosorbide   mononitrate ER Tablet (IMDUR) 120 milliGRAM(s) Oral daily  metoprolol tartrate 25 milliGRAM(s) Oral two times a day  simvastatin 10 milliGRAM(s) Oral at bedtime  vancomycin    Solution 500 milliGRAM(s) Oral every 6 hours

## 2018-07-17 NOTE — PROGRESS NOTE ADULT - ASSESSMENT
84 y/o wm with hx of ckd stage 3 ( scr 1.5-1.7) with ARYA due to volume depletion from CDiff associated colitis and diarrhea in presence of diuretic use PTA.  Now with non anion gap metabolic acidosis and worsening renal parameters.  CXR with mild CHF with hx of diastolic CHF.    PLAN  - obtain abd and lactate  - will change ivf and add bicarbonate and keep at current rate  - hold lasix done.   - fu uop and scr closely and will monitor respiratory status  - bp and hr stable now, cardizem adjusted and lopressor added    7/9 MK  - ARYA: worsening renal parameters with metabolic acidosis, non ag.  Previous lactate WNL and since placed on bicarbonate drip  fu repeat abg today.  Increase IVF rate  possibility of HD discussed with enio, hcp daughter, pt has been agreeable in past.   DC hydralazine  dc morphine and change to dilaudid  - Cdiff with rising scr and worsening abd distension: CRS consult ongoing  possible repeat ct scan  DW Dr ballesteros    7/10  Anuric  anasarca  Non anion gap acidosis on bicarb drip  ARYA, anuric  CKD 3 history  d/w Family, and pt agreeable  called ICU for line placement and to dialyze the pt in ICU for monitoring    7/10  HD initiated via R temp HD catheter  tolerating well  no complaints  good abf    7/11 SY  --ARYA with Anasarca.  Urine output slightly improved.  However, remains quite fluid overloaded.  Will plan to continue HD until fluid status is much improved.  HD order written.  3 hour tx today.  Will aim to remove 2.5 kg as tolerated.  --C DIff colitis ; continue to monitor closely.    7/12 SY  --ARYA with Anasarca.   Remains Oligo-anuric.    --HD with goal of 2.5 kg UF again today.  --C Diff colitis.   Clinically improved   Continue to monitor.    7/13  The patient was dialyzed 3 days in a row this week  Discussed with the patient's daughter and hospitalist, intensivist  Will skip dialysis today, no urgent need for dialysis  We'll dialyze the patient tomorrow on Saturday and then skip Sunday to dialyze the patient again on Monday.    7/14  We'll dialyze against 4 K bath  Case discussed with the patient's daughter  May need a permanent catheter by Monday      7/15  Dialyze with a 4K bath  Potassium is 3.3 most likely from the diarrhea  Schedule for permacath placement after dialysis on Monday or Tuesday  Patient is comfortable no complications noted at this time    7/16 MK  - ARYA/ CKD stage 3 remains oliguirc and HD dependent.  LImited UF at HD toleated with the   hypokalemia corrected with HD.  Permcath planned today  - Cdiff: on PO vanc.  improved leukocytosis and abd distension     7/17  s/p perm cath  HD tomorrow  4 K bath  replace K   overall stable

## 2018-07-17 NOTE — PROGRESS NOTE ADULT - ASSESSMENT
Pt is a 82 y/o M w/pmhx of Afib, CHF, CAD s/p stents, COPD, PNA, upper GI bleed (duodenal), PVD, s/p right lower extremity amputation,  recent hospitalization for pneumonia and subsequent Cdiff colitis admitted on 7/6 from assisted living for evaluation of persistent abdominal pain, fever and diarrhea that has been ongoing despite the po vancomycin course that the patient had been on. The patient also notes mid epigastric/chest pain. History per daughter at bedside and medical record as patient is poor historian.  1. Patient admitted with severe Cdiff pan colitis, also noted with leukocytosis most likely reactive to Cdiff infection  - follow up cultures   - iv hydration and supportive care   - serial cbc and monitor temperature   - day #11 vancomycin po 500 mg po q 6 hours  - iv flagyl day #10  - will stop flagyl today  - tolerating antibiotics without rashes or side effects   - GI evaluation in progress  - continue dialysis per nephrology; critical care  - continue contact isolation  - limit antibiotics exposure  2. other issues: Afib, CHF, CAD s/p stents, COPD, PNA, upper GI bleed (duodenal), PVD, s/p right lower extremity amputation  - per medicine

## 2018-07-17 NOTE — PROGRESS NOTE ADULT - SUBJECTIVE AND OBJECTIVE BOX
Cardiology Progress Note  Patient is a 83y old  Male who presents with a chief complaint of complain of diarrhea, fever.     HPI: Pt is a 84 y/o M w/pmhx of Afib, CHF, CAD s/p stents, COPD, PNA, upper GI bleed (duodenal)  BIB EMS from [facility] assisted living for fever, abd pain, and diarrhea that began 3 weeks ago. Was in the hospital for PNA tx and was later dx with C-diff and started on a course of PO vancomycin for 10 days for the C-diff. States that he has had diarrhea since before the course of abx. Pain has been increased for the past few weeks and is characterized as a cramping feeling. Daughters also note that there is increased distension.     7/10- pt seen and examined today am.  Daughter at bedside.     7/11- pt seen and examined by me today.  He denies any symptoms. Much alert today. NG tube in place./  Daughter at bedside.    7/16- Case d/w nursing, pt and daughter. Rectal tube placed. No CP/SOB. Continues to have diarrhea but mildly improved.     7/17- Continues to have rectal/abd pain. No CP/SOB. S/p permacath placement yesterday as well.     PAST MEDICAL & SURGICAL HISTORY:  Diastolic CHF  Peripheral vascular disease  Afib  Anemia  CKD (chronic kidney disease)  COPD (chronic obstructive pulmonary disease)  SHAYY (obstructive sleep apnea)  Sepsis, due to unspecified organism: 2/2 poorly healing wounds b/l  Dyspepsia: On moderate exertion.  Sleep apnea, obstructive: Requires home 02 therapy, and treatment with BIPAP  Atelectasis  Pleural effusion, bilateral  Respiratory failure  Peripheral edema  CRI (chronic renal insufficiency)  Gout  Benign prostatic hypertrophy  Spinal stenosis  Hypercholesterolemia  GERD (gastroesophageal reflux disease)  CAD (coronary artery disease)  Hypertension  S/P angioplasty with stent  Cataract of left eye  Prostate: Surgery green light procedure.  S/P rotator cuff surgery: Right  S/P angioplasty      MEDICATIONS  (STANDING):  allopurinol 100 milliGRAM(s) Oral daily  buDESOnide   0.5 milliGRAM(s) Respule 0.5 milliGRAM(s) Inhalation two times a day  dextrose 5% 1000 milliLiter(s) (75 mL/Hr) IV Continuous <Continuous>  diltiazem    Tablet 30 milliGRAM(s) Oral every 6 hours  doxazosin 8 milliGRAM(s) Oral at bedtime  ferrous    sulfate 325 milliGRAM(s) Oral daily  finasteride 5 milliGRAM(s) Oral daily  hydrALAZINE 50 milliGRAM(s) Oral two times a day  isosorbide   mononitrate ER Tablet (IMDUR) 120 milliGRAM(s) Oral daily  metoprolol tartrate 25 milliGRAM(s) Oral two times a day  metroNIDAZOLE  IVPB      metroNIDAZOLE  IVPB 500 milliGRAM(s) IV Intermittent every 8 hours  simvastatin 10 milliGRAM(s) Oral at bedtime  vancomycin    Solution 500 milliGRAM(s) Oral every 6 hours    MEDICATIONS  (PRN):  acetaminophen   Tablet 650 milliGRAM(s) Oral every 8 hours PRN For Temp greater than 38 C (100.4 F)  acetaminophen   Tablet. 650 milliGRAM(s) Oral every 8 hours PRN Mild Pain (1 - 3)  ALBUTerol   0.5% 2.5 milliGRAM(s) Nebulizer every 4 hours PRN Shortness of Breath and/or Wheezing  morphine  - Injectable 2 milliGRAM(s) IV Push every 6 hours PRN Severe Pain (7 - 10)  ondansetron Injectable 4 milliGRAM(s) IV Push every 6 hours PRN Nausea and/or Vomiting    FAMILY HISTORY: No pertinent family history in first degree relatives    SOCIAL HISTORY: as above.    REVIEW OF SYSTEMS:  CONSTITUTIONAL:    No fatigue, malaise, lethargy.  No fever or chills.  HEENT:  Eyes:  No visual changes.     ENT:  No epistaxis.  No sinus pain.    RESPIRATORY:  No cough.  No wheeze.  No hemoptysis.  No shortness of breath.  CARDIOVASCULAR:  No chest pains.  No palpitations. No shortness of breath, No orthopnea or PND.  GASTROINTESTINAL:  no abdominal pain.  No nausea or vomiting.    GENITOURINARY:    No hematuria.    MUSCULOSKELETAL:  No musculoskeletal pain.  No joint swelling.  No arthritis.  NEUROLOGICAL:  No tingling or numbness or weakness.  PSYCHIATRIC:  No confusion    Vital Signs Last 24 Hrs  T(C): 36.4 (09 Jul 2018 04:00), Max: 36.7 (08 Jul 2018 11:40)  T(F): 97.6 (09 Jul 2018 04:00), Max: 98.1 (08 Jul 2018 11:40)  HR: 66 (09 Jul 2018 07:40) (66 - 80)  BP: 115/23 (09 Jul 2018 04:00) (109/28 - 137/33)  BP(mean): --  RR: 16 (09 Jul 2018 04:00) (16 - 18)  SpO2: 93% (09 Jul 2018 04:00) (91% - 95%)    PHYSICAL EXAM-    Constitutional: ill looking elderly male in no acute distress.     Head: Head is normocephalic and atraumatic.      Neck: NG tube in place    Cardiovascular: Regular rate and rhythm without S3, S4. No murmurs or rubs are appreciated.      Respiratory: Breath sounds are normal. No rales. No wheezing.    Abdomen: Soft, nontender, distended with positive bowel sounds.      Extremity: No tenderness. No  pitting edema     Neurologic: The patient is alert and oriented.      INTERPRETATION OF TELEMETRY: sinus rythm, NSVT, bigeminy    ECG:    I&O's Detail      LABS:                        8.1    20.78 )-----------( 239      ( 09 Jul 2018 05:48 )             25.6     07-09    144  |  114<H>  |  65<H>  ----------------------------<  113<H>  4.5   |  17<L>  |  3.90<H>    Ca    7.1<L>      09 Jul 2018 05:48  Mg     1.9     07-09    CARDIAC MARKERS ( 07 Jul 2018 17:51 )  0.024 ng/mL / x     / x     / x     / x        I&O's Summary    BNP  RADIOLOGY & ADDITIONAL STUDIES:    < from: Transthoracic Echocardiogram (07.10.18 @ 10:21) >     Fibrocalcific changes noted to the mitral valve leaflets with preserved   leaflet excursion.   Moderate (2+) mitral regurgitation is present.   The left atrium is moderately dilated.   Mild concentric left ventricular hypertrophy is present.   Estimated left ventricular ejection fraction is 55-60 %.    < end of copied text >

## 2018-07-17 NOTE — PROGRESS NOTE ADULT - ASSESSMENT
· Assessment		  1.  c . dif colitis /diarrheaand leucocytosis improving  currently with rectal tube     to consider discontinuing rectal tube once stools more formed to prevent further excoriation of sacral/ buttock skin region  continue po vanco #day 11, duration of abx course as per ID and GI  s/p  flagyl #10 completed 7/17    cholestyramine started to solidify stools  GI and surgery f/u appreciated   ID follows up appreciated   ,  2. developed acute renal failure requiring HD for the 1st time this admission   now has right sided perma cath for HD -procedure done 7/16  s/pRight temp HD cath  HD per renal    # s/p atypical Chest pain s/p permacath procedure- likely from anxiety  EKG no acute changes  give pepcid stat resolved    #hx afib not on AC due to hx GI bleed in past/ s/p afib with RVR this admission s/p cardizem drip  #s/p NSVT and bigemnini- on ccb, echo EF wnl  #s/p transient bradycardia-mobitz type 2 thought to be vagal response-needs sleep study as outpatient  cardio evaluation appreciated   no acute cardiac issues at this time       #Anasarca/B/l pleural effusions likely from ARYA  now improving with HD    # hx restrictive lung disease and SHAYY on BIPAP in past stopped BIPAP after weight loss  continue supplemental o2     # DVT prophylaxis  heparin sc     # disposition -hoping stools more formed tomorrow7/18  and once rectal tube can be discontinued would consider d/c planning- hoping for discharge 7/18 or 7/19  PT norbert · Assessment		  1.  c . dif colitis /diarrheaand leucocytosis improving  currently with rectal tube     to consider discontinuing rectal tube once stools more formed to prevent further excoriation of sacral/ buttock skin region  continue po vanco #day 11, duration of abx course as per ID and GI  s/p  flagyl #10 completed 7/17    cholestyramine started to solidify stools  GI and surgery f/u appreciated   ID follows up appreciated   ,  2. developed acute renal failure requiring HD for the 1st time this admission   now has right sided perma cath for HD -procedure done 7/16  s/pRight temp HD cath  HD per renal    # s/p atypical Chest pain s/p permacath procedure- likely from anxiety  EKG no acute changes  give pepcid stat resolved    #hx afib not on AC due to hx GI bleed in past/ s/p afib with RVR this admission s/p cardizem drip  #s/p NSVT and bigemnini- on ccb, echo EF wnl  #s/p transient bradycardia-mobitz type 2 thought to be vagal response-needs sleep study as outpatient  cardio evaluation appreciated   no acute cardiac issues at this time       #Anasarca/B/l pleural effusions likely from ARYA  now improving with HD    # hx restrictive lung disease and SHAYY on BIPAP in past stopped BIPAP after weight loss  continue supplemental o2     # chronic normocytic anemia/secondary to anemia of chronic disease from ESRD /no evidence of active gI bleed  stable  monitor while in patient   outpatient w/u    # DVT prophylaxis  heparin sc     # disposition -hoping stools more formed tomorrow7/18  and once rectal tube can be discontinued would consider d/c planning- hoping for discharge 7/18 or 7/19  PT norbert · Assessment		  1.  c . dif colitis /diarrheaand leucocytosis improving  currently with rectal tube     to consider discontinuing rectal tube once stools more formed  rectal tube was placed  to prevent further excoriation of sacral/ buttock skin region  continue po vanco #day 11, duration of abx course as per ID and GI  s/p  flagyl #10 completed 7/17    cholestyramine started to solidify stools  GI and surgery f/u appreciated   ID follows up appreciated   ,  2. developed acute renal failure requiring HD for the 1st time this admission   now has right sided perma cath for HD -procedure done 7/16  s/pRight temp HD cath  HD per renal    # s/p atypical Chest pain s/p permacath procedure- likely from anxiety  EKG no acute changes  give pepcid stat resolved    #hx afib not on AC due to hx GI bleed in past/ s/p afib with RVR this admission s/p cardizem drip  #s/p NSVT and bigemnini- on ccb, echo EF wnl  #s/p transient bradycardia-mobitz type 2 thought to be vagal response-needs sleep study as outpatient  cardio evaluation appreciated   no acute cardiac issues at this time       #Anasarca/B/l pleural effusions likely from ARYA  now improving with HD    # hx restrictive lung disease and SHAYY on BIPAP in past stopped BIPAP after weight loss  continue supplemental o2     # chronic normocytic anemia/secondary to anemia of chronic disease from ESRD /no evidence of active gI bleed  stable  monitor while in patient   outpatient w/u    # DVT prophylaxis  heparin sc     # disposition -hoping stools more formed tomorrow7/18  and once rectal tube can be discontinued would consider d/c planning- hoping for discharge 7/18 or 7/19  PT norbert

## 2018-07-17 NOTE — PROGRESS NOTE ADULT - SUBJECTIVE AND OBJECTIVE BOX
Patient is a 83y old  Male who presents with a chief complaint of complain of diarrhea, fever (06 Jul 2018 23:55)      HPI:  Pt is a 82 y/o M w/pmhx of Afib, CHF, CAD s/p stents, COPD, PNA, upper GI bleed (duodenal)  BIB EMS from [facility] assisted living for fever, abd pain, and diarrhea that began 3 weeks ago. Was in the hospital for PNA tx and was later dx with C-diff and started on a course of PO vancomycin for 10 days for the C-diff. States that he has had diarrhea since before the course of abx. Pain has been increased for the past few weeks and is characterized as a cramping feeling. Daughters also note that there is increased distension. Also notes chest pain characterized as a cramping in the central chest. 83% O2 sat noted this morning at the assisted living facility. Takes O2 routinely at night but not during the day. (06 Jul 2018 23:55)      History per patient, daughter and RN  Had about 100 mL of soft watery stool, possibly increased informed per RN yesterday doing per shift.  Negative abdominal pain. Increased by mouth intake.  He has significant erythema and irritation around the perianal area and therefore fecal bag is being kept in place.  Denies any abdominal pain, had mild reflux.        PAST MEDICAL & SURGICAL HISTORY:  Diastolic CHF  Peripheral vascular disease  Afib  Anemia  CKD (chronic kidney disease)  COPD (chronic obstructive pulmonary disease)  SHAYY (obstructive sleep apnea)  Sepsis, due to unspecified organism: 2/2 poorly healing wounds b/l  Dyspepsia: On moderate exertion.  Sleep apnea, obstructive: Requires home 02 therapy, and treatment with BIPAP  Atelectasis  Pleural effusion, bilateral  Respiratory failure  Peripheral edema  CRI (chronic renal insufficiency)  Gout  Benign prostatic hypertrophy  Spinal stenosis  Hypercholesterolemia  GERD (gastroesophageal reflux disease)  CAD (coronary artery disease)  Hypertension  S/P angioplasty with stent  Cataract of left eye  Prostate: Surgery green light procedure.  S/P rotator cuff surgery: Right  S/P angioplasty      MEDICATIONS  (STANDING):  allopurinol 100 milliGRAM(s) Oral daily  aspirin  chewable 81 milliGRAM(s) Oral daily  buDESOnide   0.5 milliGRAM(s) Respule 0.5 milliGRAM(s) Inhalation two times a day  cholestyramine Powder (Sugar-Free) 4 Gram(s) Oral two times a day  diltiazem    Tablet 30 milliGRAM(s) Oral every 6 hours  doxazosin 8 milliGRAM(s) Oral at bedtime  finasteride 5 milliGRAM(s) Oral daily  heparin  Injectable 5000 Unit(s) SubCutaneous every 12 hours  isosorbide   mononitrate ER Tablet (IMDUR) 120 milliGRAM(s) Oral daily  metoprolol tartrate 25 milliGRAM(s) Oral two times a day  metroNIDAZOLE  IVPB      metroNIDAZOLE  IVPB 500 milliGRAM(s) IV Intermittent every 8 hours  simvastatin 10 milliGRAM(s) Oral at bedtime  vancomycin    Solution 500 milliGRAM(s) Oral every 6 hours    MEDICATIONS  (PRN):  acetaminophen   Tablet 650 milliGRAM(s) Oral every 8 hours PRN For Temp greater than 38 C (100.4 F)  acetaminophen   Tablet. 650 milliGRAM(s) Oral every 8 hours PRN Mild Pain (1 - 3)  albumin human 25% IVPB 50 milliLiter(s) IV Intermittent <User Schedule> PRN for SBP</=120  ALBUTerol   0.5% 2.5 milliGRAM(s) Nebulizer every 4 hours PRN Shortness of Breath and/or Wheezing  diphenhydrAMINE   Capsule 25 milliGRAM(s) Oral at bedtime PRN sleep  ondansetron Injectable 4 milliGRAM(s) IV Push every 6 hours PRN Nausea and/or Vomiting      Allergies    Zosyn (Rash)    Intolerances        SOCIAL HISTORY:NC    FAMILY HISTORY:  No pertinent family history in first degree relatives      REVIEW OF SYSTEMS:    CONSTITUTIONAL: No weakness, fevers or chills  EYES/ENT: No visual changes;  No vertigo or throat pain   NECK: No pain or stiffness  RESPIRATORY: No cough, wheezing, hemoptysis; No shortness of breath  CARDIOVASCULAR: No chest pain or palpitations  GENITOURINARY: No dysuria, frequency or hematuria  NEUROLOGICAL: No numbness or weakness  SKIN: No itching, burning, rashes, or lesions   All other review of systems is negative unless indicated above.    Vital Signs Last 24 Hrs  T(C): 36.6 (17 Jul 2018 04:00), Max: 37.1 (16 Jul 2018 17:00)  T(F): 97.9 (17 Jul 2018 04:00), Max: 98.8 (16 Jul 2018 17:00)  HR: 67 (17 Jul 2018 06:00) (63 - 115)  BP: 131/42 (17 Jul 2018 06:00) (83/43 - 143/41)  BP(mean): 66 (17 Jul 2018 06:00) (49 - 110)  RR: 17 (17 Jul 2018 06:00) (0 - 28)  SpO2: 95% (17 Jul 2018 06:00) (87% - 99%)    PHYSICAL EXAM:    Constitutional: NAD, well-developed  HEENT: EOMI, throat clear  Neck: No LAD, supple  Respiratory: CTA and P  Cardiovascular: S1 and S2, RRR, no M  Gastrointestinal: BS+, soft, NT, mild distension and soft, neg HSM,  Extremities: pos edema  Neurological: A/O x 3, no focal deficits  Psychiatric: Normal mood, normal affect  Skin: No rashes    LABS:  CBC Full  -  ( 17 Jul 2018 05:41 )  WBC Count : 10.13 K/uL  Hemoglobin : 8.4 g/dL  Hematocrit : 26.8 %  Platelet Count - Automated : 199 K/uL  Mean Cell Volume : 89.3 fl  Mean Cell Hemoglobin : 28.0 pg  Mean Cell Hemoglobin Concentration : 31.3 gm/dL  Auto Neutrophil # : 8.44 K/uL  Auto Lymphocyte # : 0.48 K/uL  Auto Monocyte # : 0.80 K/uL  Auto Eosinophil # : 0.32 K/uL  Auto Basophil # : 0.01 K/uL  Auto Neutrophil % : 83.3 %  Auto Lymphocyte % : 4.7 %  Auto Monocyte % : 7.9 %  Auto Eosinophil % : 3.2 %  Auto Basophil % : 0.1 %    07-17    142  |  108  |  19  ----------------------------<  89  3.4<L>   |  25  |  3.02<H>    Ca    7.2<L>      17 Jul 2018 05:41      PT/INR - ( 16 Jul 2018 12:10 )   PT: 12.7 sec;   INR: 1.17 ratio         PTT - ( 16 Jul 2018 12:10 )  PTT:28.2 sec    07-10 @ 16:30  Hep A Igm Nonreact  Hep A total ab, IgA and M --  Hep B core Ab total --  Hep B core IgM Nonreact  Hep B DNA PCR --  Hep BSAg --  Hep BSAb --  Hep BSAb --  HCV Ab --  HCV RNA Log --  HCV RNA interp --                RADIOLOGY & ADDITIONAL STUDIES:

## 2018-07-17 NOTE — PROGRESS NOTE ADULT - ASSESSMENT
84 y/o M w/pmhx of Afib, CHF, CAD s/p stents, COPD, PNA, upper GI bleed (duodenal)  BIB EMS from [facility] assisted living for fever, abd pain, and diarrhea that began 3 weeks ago. Was in the hospital for PNA tx and was later dx with C-diff and started on a course of PO vancomycin for 10 days for C-diff. Cardiology called for abnormal tele findings, abnormal EKG.     1. Mobitz II- EP consult appreciated. No recurrent events. He will need reevaluation for sleep apnea as outpt. Bradycardia thought to be vagally induced.   Now SR.    2. NSVT, bigeminy- keep K>4, Mag>2. Cont CCB for now. Cont to follow on tele. Echo with normal LV fxn, mod MR.    3. Renal  insufficiency - HD as per renal. S/p permacath placement yesterday.     4. Abdominal pain - Management pre primary team. Related to CDiff- cont abx. GI following.    5. Atrial fibrillation- off anticoagulation secondary to severe GI bleed in past requiring 15 units of pRBCs.    6. Cont ICU monitoring today. DVT proph. Pain control with PRN dilaudid.

## 2018-07-17 NOTE — PROGRESS NOTE ADULT - SUBJECTIVE AND OBJECTIVE BOX
NEPHROLOGY INTERVAL HPI/OVERNIGHT EVENTS:  seen at HD   no c/o   tolerating po  still with no UOP   diarrhea slowing down    7/17  feels well  no complaints   s/p permacath placement  tolerated well  next hd wednesday      MEDICATIONS  (STANDING):  allopurinol 100 milliGRAM(s) Oral daily  aspirin  chewable 81 milliGRAM(s) Oral daily  buDESOnide   0.5 milliGRAM(s) Respule 0.5 milliGRAM(s) Inhalation two times a day  cholestyramine Powder (Sugar-Free) 4 Gram(s) Oral two times a day  diltiazem    Tablet 30 milliGRAM(s) Oral every 6 hours  doxazosin 8 milliGRAM(s) Oral at bedtime  finasteride 5 milliGRAM(s) Oral daily  heparin  Injectable 5000 Unit(s) SubCutaneous every 12 hours  isosorbide   mononitrate ER Tablet (IMDUR) 120 milliGRAM(s) Oral daily  metoprolol tartrate 25 milliGRAM(s) Oral two times a day  metroNIDAZOLE  IVPB      metroNIDAZOLE  IVPB 500 milliGRAM(s) IV Intermittent every 8 hours  simvastatin 10 milliGRAM(s) Oral at bedtime  vancomycin    Solution 500 milliGRAM(s) Oral every 6 hours    MEDICATIONS  (PRN):  acetaminophen   Tablet 650 milliGRAM(s) Oral every 8 hours PRN For Temp greater than 38 C (100.4 F)  acetaminophen   Tablet. 650 milliGRAM(s) Oral every 8 hours PRN Mild Pain (1 - 3)  albumin human 25% IVPB 50 milliLiter(s) IV Intermittent <User Schedule> PRN for SBP</=120  ALBUTerol   0.5% 2.5 milliGRAM(s) Nebulizer every 4 hours PRN Shortness of Breath and/or Wheezing  diphenhydrAMINE   Capsule 25 milliGRAM(s) Oral at bedtime PRN sleep  HYDROmorphone  Injectable 0.5 milliGRAM(s) IV Push every 6 hours PRN Severe Pain (7 - 10)  ondansetron Injectable 4 milliGRAM(s) IV Push every 6 hours PRN Nausea and/or Vomiting        Allergies    Zosyn (Rash)    Intolerances      Vital Signs Last 24 Hrs  T(C): 37.3 (17 Jul 2018 08:00), Max: 37.3 (17 Jul 2018 08:00)  T(F): 99.1 (17 Jul 2018 08:00), Max: 99.1 (17 Jul 2018 08:00)  HR: 75 (17 Jul 2018 09:00) (63 - 115)  BP: 94/34 (17 Jul 2018 09:00) (94/34 - 143/41)  BP(mean): 49 (17 Jul 2018 09:00) (49 - 110)  RR: 20 (17 Jul 2018 09:00) (0 - 28)  SpO2: 91% (17 Jul 2018 09:00) (87% - 99%)    PHYSICAL EXAM:  General: alert. awake Ox3  HEENT: MMM  CV: s1s2 rrr  Abd distended, diminished BS  LUNGS: B/L CTA  EXT: no edema    LABS:                                   8.4    10.13 )-----------( 199      ( 17 Jul 2018 05:41 )             26.8       07-17    142  |  108  |  19  ----------------------------<  89  3.4<L>   |  25  |  3.02<H>    Ca    7.2<L>      17 Jul 2018 05:41

## 2018-07-17 NOTE — PROGRESS NOTE ADULT - SUBJECTIVE AND OBJECTIVE BOX
Patient is a 83y old  Male who presents with a chief complaint of complain of diarrhea, fever (06 Jul 2018 23:55)      HPI:  Pt is a 84 y/o M w/pmhx of Afib, CHF, CAD s/p stents, COPD, PNA, upper GI bleed (duodenal)  BIB EMS from [facility] assisted living for fever, abd pain, and diarrhea that began 3 weeks ago. Was in the hospital for PNA tx and was later dx with C-diff and started on a course of PO vancomycin for 10 days for the C-diff. States that he has had diarrhea since before the course of abx. Pain has been increased for the past few weeks and is characterized as a cramping feeling. Daughters also note that there is increased distension. Also notes chest pain characterized as a cramping in the central chest. 83% O2 sat noted this morning at the assisted living facility. Takes O2 routinely at night but not during the day. (06 Jul 2018 23:55)  Patient's daughter states he was treated for ESBL with antibiotics prior to this  7/10  seen and examined with his dter at bedside  hx of resp failure and intubation 2012 x 3-4 days  feels ok with breathing now  being evaluated for surgical interventions with pancolitis  possibility of post op need for vent support discussed witrh pt and his dtr  he wants to proceed understanding high risk for surgery due to compromised medical condition  hx SHAYY and treated with BIPAP in the past / stopped using it after wt loss  7/11  seen in ICU with dtr at bedside  data discussed with critical care RN  s/p Ross and hemodialysis  still with abd distention  family wants to wait on abd surgery given he is high risk  7/12  family at bedside updated  no cp  no difficulty breathing  intermittent wheezing  7/13  NGT is discontinued  dtr at bedside updated  no issue with breathing now  7/14  having HD  some po intake tolerated ok  dtr at bedside updated  no active pulm issues  7/15  resting comfortably  no distress  Gi and renal eval noted  7/16  feels better  having dialysis  awake and alert  po intake tolerated well  7/17  dtr at bedside  s/lauren tate placed right chest  no cp  PAST MEDICAL & SURGICAL HISTORY:  Diastolic CHF  Peripheral vascular disease  Afib  Anemia  CKD (chronic kidney disease)  COPD (chronic obstructive pulmonary disease)  SHAYY (obstructive sleep apnea)  Sepsis, due to unspecified organism: 2/2 poorly healing wounds b/l  Dyspepsia: On moderate exertion.  Sleep apnea, obstructive: Requires home 02 therapy, and treatment with BIPAP  Atelectasis  Pleural effusion, bilateral  Respiratory failure  Peripheral edema  CRI (chronic renal insufficiency)  Gout  Benign prostatic hypertrophy  Spinal stenosis  Hypercholesterolemia  GERD (gastroesophageal reflux disease)  CAD (coronary artery disease)  Hypertension  S/P angioplasty with stent  Cataract of left eye  Prostate: Surgery green light procedure.  S/P rotator cuff surgery: Right  S/P angioplasty    MEDICATIONS  (STANDING):  allopurinol 100 milliGRAM(s) Oral daily  aspirin  chewable 81 milliGRAM(s) Oral daily  buDESOnide   0.5 milliGRAM(s) Respule 0.5 milliGRAM(s) Inhalation two times a day  cholestyramine Powder (Sugar-Free) 4 Gram(s) Oral two times a day  diltiazem    Tablet 30 milliGRAM(s) Oral every 6 hours  doxazosin 8 milliGRAM(s) Oral at bedtime  finasteride 5 milliGRAM(s) Oral daily  heparin  Injectable 5000 Unit(s) SubCutaneous every 12 hours  isosorbide   mononitrate ER Tablet (IMDUR) 120 milliGRAM(s) Oral daily  metoprolol tartrate 25 milliGRAM(s) Oral two times a day  metroNIDAZOLE  IVPB      metroNIDAZOLE  IVPB 500 milliGRAM(s) IV Intermittent every 8 hours  simvastatin 10 milliGRAM(s) Oral at bedtime  vancomycin    Solution 500 milliGRAM(s) Oral every 6 hours  s            MEDICATIONS  (PRN):  acetaminophen   Tablet 650 milliGRAM(s) Oral every 8 hours PRN For Temp greater than 38 C (100.4 F)  acetaminophen   Tablet. 650 milliGRAM(s) Oral every 8 hours PRN Mild Pain (1 - 3)  ALBUTerol   0.5% 2.5 milliGRAM(s) Nebulizer every 4 hours PRN Shortness of Breath and/or Wheezing  morphine  - Injectable 2 milliGRAM(s) IV Push every 6 hours PRN Severe Pain (7 - 10)  ondansetron Injectable 4 milliGRAM(s) IV Push every 6 hours PRN Nausea and/or Vomiting      FAMILY HISTORY:  No pertinent family history in first degree relatives    REVIEW OF SYSTEM:  abdominal pain, otherwise 12 point ROS negative     Vital Signs Last 24 Hrs  T(C): 36.6 (17 Jul 2018 04:00), Max: 37.1 (16 Jul 2018 17:00)  T(F): 97.9 (17 Jul 2018 04:00), Max: 98.8 (16 Jul 2018 17:00)  HR: 67 (17 Jul 2018 06:00) (63 - 115)  BP: 131/42 (17 Jul 2018 06:00) (83/43 - 143/41)  BP(mean): 66 (17 Jul 2018 06:00) (49 - 110)  RR: 17 (17 Jul 2018 06:00) (0 - 28)  SpO2: 95% (17 Jul 2018 06:00) (87% - 99%)  PHYSICAL EXAM  General Appearance: cooperative, no acute distress,   HEENT: PERRL, conjunctiva clear, EOM's intact, non injected pharynx, no exudate, TM   normal  Neck: Supple, , no adenopathy, thyroid: not enlarged, no carotid bruit or JVD  Back: Symmetric, no  tenderness,no soft tissue tenderness  Lungs: Diminished bilaterally R>L with some dullness to percussion  Heart: Regular rate and rhythm, S1, S2 normal, no murmur, rub or gallop  Abdomen:  decreased-tender,no active bowel sounds, Distended , no hepatosplenomegaly  Extremities: pos peripheral edema / Hx Right leg amputation  Skin: Skin color, texture normal, no rashes   Neurologic: Alert and oriented X3 , cranial nerves intact, sensory and motor normal,    ECG:    LABS:                                 8.4    10.13 )-----------( 199      ( 17 Jul 2018 05:41 )             26.8   07-17    142  |  108  |  19  ----------------------------<  89  3.4<L>   |  25  |  3.02<H>    Ca    7.2<L>      17 Jul 2018 05:41                 8.4    8.91  )-----------( 181      ( 15 Jul 2018 06:16 )             26.4   07-15    142  |  107  |  23  ----------------------------<  101<H>  3.3<L>   |  26  |  3.04<H>    Ca    7.5<L>      15 Jul 2018 06:16                            8.2    10.15 )-----------( 183      ( 13 Jul 2018 05:14 )             26.1   07-13    143  |  107  |  26<H>  ----------------------------<  89  3.5   |  26  |  2.62<H>    Ca    7.2<L>      13 Jul 2018 05:14  Phos  4.3     07-12  Mg     2.0     07-12    TPro  x   /  Alb  2.0<L>  /  TBili  x   /  DBili  x   /  AST  x   /  ALT  x   /  AlkPhos  x   07-12                            8.3    10.62 )-----------( 202      ( 12 Jul 2018 05:17 )             26.8   07-12    141  |  107  |  33<H>  ----------------------------<  66<L>  3.9   |  23  |  2.84<H>    Ca    7.4<L>      12 Jul 2018 05:17  Phos  4.3     07-12  Mg     2.0     07-12    TPro  x   /  Alb  2.0<L>  /  TBili  x   /  DBili  x   /  AST  x   /  ALT  x   /  AlkPhos  x   07-12                          8.8    20.36 )-----------( 265      ( 10 Jul 2018 05:47 )             28.0   07-11    142  |  108  |  52<H>  ----------------------------<  80  4.0   |  23  |  3.82<H>    Ca    7.0<L>      11 Jul 2018 06:20  Phos  4.9     07-11  Mg     1.9     07-11                              8.1    20.78 )-----------( 239      ( 09 Jul 2018 05:48 )             25.6     07-09    144  |  114<H>  |  65<H>  ----------------------------<  113<H>  4.5   |  17<L>  |  3.90<H>    Ca    7.1<L>      09 Jul 2018 05:48  Mg     1.9     07-09      CARDIAC MARKERS ( 07 Jul 2018 17:51 )  0.024 ng/mL / x     / x     / x     / x                  ABG - ( 08 Jul 2018 15:47 )  pH, Arterial: 7.18  pH, Blood: x     /  pCO2: 39    /  pO2: 68    / HCO3: 14    / Base Excess: -13.1 /  SaO2: 92            < from: Xray Chest 1 View-PORTABLE IMMEDIATE (07.10.18 @ 15:50) >  INTERPRETATION:  CLINICAL INDICATION:  R  I J dialysis line placement    TECHNIQUE: Single view AP chest radiograph was performed.    COMPARISON:  Chest radiograph performed earlier on the same day,   7/10/2018 at 8:28 AM and chest radiograph dated 7/6/2018.    FINDINGS:    Interval placement of right-sided jugular central venous catheter with   tip projecting over the right atrium. No evidence forpneumothorax.    There is persistent mild pulmonary vascular congestion. There is trace   fluid within the right minor fissure.    The cardiac silhouette size is stable. Diffuse aortic calcifications are   noted    Redemonstration of anchor screw within the right humeral head.     IMPRESSION:    Interval placement of right-sided IJ CVC, with tip projecting over the   right atrium. No pneumothorax.     < end of copied text >          RADIOLOGY & ADDITIONAL STUDIES:  IMPRESSION:     Severe pancolitis consistent with pseudomembranous colitis.    Mild pulmonary edema.    Small loculated right and trace layering left pleural effusions.

## 2018-07-18 DIAGNOSIS — N28.9 DISORDER OF KIDNEY AND URETER, UNSPECIFIED: ICD-10-CM

## 2018-07-18 DIAGNOSIS — J96.01 ACUTE RESPIRATORY FAILURE WITH HYPOXIA: ICD-10-CM

## 2018-07-18 DIAGNOSIS — I48.91 UNSPECIFIED ATRIAL FIBRILLATION: ICD-10-CM

## 2018-07-18 DIAGNOSIS — I44.1 ATRIOVENTRICULAR BLOCK, SECOND DEGREE: ICD-10-CM

## 2018-07-18 LAB
ANION GAP SERPL CALC-SCNC: 10 MMOL/L — SIGNIFICANT CHANGE UP (ref 5–17)
BASOPHILS # BLD AUTO: 0.04 K/UL — SIGNIFICANT CHANGE UP (ref 0–0.2)
BASOPHILS NFR BLD AUTO: 0.4 % — SIGNIFICANT CHANGE UP (ref 0–2)
BUN SERPL-MCNC: 27 MG/DL — HIGH (ref 7–23)
CALCIUM SERPL-MCNC: 7 MG/DL — LOW (ref 8.5–10.1)
CHLORIDE SERPL-SCNC: 109 MMOL/L — HIGH (ref 96–108)
CO2 SERPL-SCNC: 25 MMOL/L — SIGNIFICANT CHANGE UP (ref 22–31)
CREAT SERPL-MCNC: 3.65 MG/DL — HIGH (ref 0.5–1.3)
EOSINOPHIL # BLD AUTO: 0.43 K/UL — SIGNIFICANT CHANGE UP (ref 0–0.5)
EOSINOPHIL NFR BLD AUTO: 4.3 % — SIGNIFICANT CHANGE UP (ref 0–6)
GLUCOSE SERPL-MCNC: 103 MG/DL — HIGH (ref 70–99)
HCT VFR BLD CALC: 27 % — LOW (ref 39–50)
HGB BLD-MCNC: 8.3 G/DL — LOW (ref 13–17)
IMM GRANULOCYTES NFR BLD AUTO: 0.8 % — SIGNIFICANT CHANGE UP (ref 0–1.5)
LYMPHOCYTES # BLD AUTO: 0.59 K/UL — LOW (ref 1–3.3)
LYMPHOCYTES # BLD AUTO: 6 % — LOW (ref 13–44)
MCHC RBC-ENTMCNC: 28 PG — SIGNIFICANT CHANGE UP (ref 27–34)
MCHC RBC-ENTMCNC: 30.7 GM/DL — LOW (ref 32–36)
MCV RBC AUTO: 91.2 FL — SIGNIFICANT CHANGE UP (ref 80–100)
MONOCYTES # BLD AUTO: 0.77 K/UL — SIGNIFICANT CHANGE UP (ref 0–0.9)
MONOCYTES NFR BLD AUTO: 7.8 % — SIGNIFICANT CHANGE UP (ref 2–14)
NEUTROPHILS # BLD AUTO: 7.98 K/UL — HIGH (ref 1.8–7.4)
NEUTROPHILS NFR BLD AUTO: 80.7 % — HIGH (ref 43–77)
NRBC # BLD: 0 /100 WBCS — SIGNIFICANT CHANGE UP (ref 0–0)
PLATELET # BLD AUTO: 211 K/UL — SIGNIFICANT CHANGE UP (ref 150–400)
POTASSIUM SERPL-MCNC: 3.4 MMOL/L — LOW (ref 3.5–5.3)
POTASSIUM SERPL-SCNC: 3.4 MMOL/L — LOW (ref 3.5–5.3)
RBC # BLD: 2.96 M/UL — LOW (ref 4.2–5.8)
RBC # FLD: 20.6 % — HIGH (ref 10.3–14.5)
SODIUM SERPL-SCNC: 144 MMOL/L — SIGNIFICANT CHANGE UP (ref 135–145)
WBC # BLD: 9.89 K/UL — SIGNIFICANT CHANGE UP (ref 3.8–10.5)
WBC # FLD AUTO: 9.89 K/UL — SIGNIFICANT CHANGE UP (ref 3.8–10.5)

## 2018-07-18 RX ORDER — ALBUMIN HUMAN 25 %
50 VIAL (ML) INTRAVENOUS
Qty: 0 | Refills: 0 | Status: DISCONTINUED | OUTPATIENT
Start: 2018-07-18 | End: 2018-08-09

## 2018-07-18 RX ORDER — POTASSIUM CHLORIDE 20 MEQ
10 PACKET (EA) ORAL
Qty: 0 | Refills: 0 | Status: COMPLETED | OUTPATIENT
Start: 2018-07-18 | End: 2018-07-18

## 2018-07-18 RX ORDER — ERYTHROPOIETIN 10000 [IU]/ML
3000 INJECTION, SOLUTION INTRAVENOUS; SUBCUTANEOUS
Qty: 0 | Refills: 0 | Status: DISCONTINUED | OUTPATIENT
Start: 2018-07-18 | End: 2018-08-03

## 2018-07-18 RX ORDER — ERYTHROPOIETIN 10000 [IU]/ML
4000 INJECTION, SOLUTION INTRAVENOUS; SUBCUTANEOUS
Qty: 0 | Refills: 0 | Status: DISCONTINUED | OUTPATIENT
Start: 2018-07-18 | End: 2018-08-03

## 2018-07-18 RX ORDER — POTASSIUM CHLORIDE 20 MEQ
10 PACKET (EA) ORAL ONCE
Qty: 0 | Refills: 0 | Status: COMPLETED | OUTPATIENT
Start: 2018-07-18 | End: 2018-07-18

## 2018-07-18 RX ADMIN — CHOLESTYRAMINE 4 GRAM(S): 4 POWDER, FOR SUSPENSION ORAL at 10:20

## 2018-07-18 RX ADMIN — Medication 50 MILLILITER(S): at 17:26

## 2018-07-18 RX ADMIN — ERYTHROPOIETIN 4000 UNIT(S): 10000 INJECTION, SOLUTION INTRAVENOUS; SUBCUTANEOUS at 17:42

## 2018-07-18 RX ADMIN — Medication 100 MILLIGRAM(S): at 12:35

## 2018-07-18 RX ADMIN — Medication 25 MILLIGRAM(S): at 10:20

## 2018-07-18 RX ADMIN — FINASTERIDE 5 MILLIGRAM(S): 5 TABLET, FILM COATED ORAL at 12:35

## 2018-07-18 RX ADMIN — Medication 500 MILLIGRAM(S): at 00:41

## 2018-07-18 RX ADMIN — Medication 0.5 MILLIGRAM(S): at 20:00

## 2018-07-18 RX ADMIN — Medication 25 MILLIGRAM(S): at 03:32

## 2018-07-18 RX ADMIN — CHOLESTYRAMINE 4 GRAM(S): 4 POWDER, FOR SUSPENSION ORAL at 22:08

## 2018-07-18 RX ADMIN — Medication 81 MILLIGRAM(S): at 12:35

## 2018-07-18 RX ADMIN — HEPARIN SODIUM 5000 UNIT(S): 5000 INJECTION INTRAVENOUS; SUBCUTANEOUS at 18:49

## 2018-07-18 RX ADMIN — ISOSORBIDE MONONITRATE 120 MILLIGRAM(S): 60 TABLET, EXTENDED RELEASE ORAL at 12:35

## 2018-07-18 RX ADMIN — ERYTHROPOIETIN 3000 UNIT(S): 10000 INJECTION, SOLUTION INTRAVENOUS; SUBCUTANEOUS at 17:41

## 2018-07-18 RX ADMIN — Medication 50 MILLILITER(S): at 18:01

## 2018-07-18 RX ADMIN — Medication 500 MILLIGRAM(S): at 06:53

## 2018-07-18 RX ADMIN — HEPARIN SODIUM 5000 UNIT(S): 5000 INJECTION INTRAVENOUS; SUBCUTANEOUS at 06:53

## 2018-07-18 RX ADMIN — Medication 25 MILLIGRAM(S): at 22:08

## 2018-07-18 RX ADMIN — Medication 500 MILLIGRAM(S): at 12:54

## 2018-07-18 RX ADMIN — SIMVASTATIN 10 MILLIGRAM(S): 20 TABLET, FILM COATED ORAL at 22:08

## 2018-07-18 RX ADMIN — Medication 8 MILLIGRAM(S): at 22:08

## 2018-07-18 RX ADMIN — Medication 500 MILLIGRAM(S): at 18:49

## 2018-07-18 NOTE — PROGRESS NOTE ADULT - PROBLEM SELECTOR PLAN 1
-Vancomycin 500mg PO q6h, Day 12  -s/p Flagyl 10 days  -ID appreciated, continue Vancomycin  -GI appreciated, likely a long taper of Vancomycin after d/c  -Colorectal Consult appreciated, would not consider intervention at this time

## 2018-07-18 NOTE — PROGRESS NOTE ADULT - ASSESSMENT
Pt is a 84 y/o M w/pmhx of Afib, CHF, CAD s/p stents, COPD, PNA, upper GI bleed (duodenal), PVD, s/p right lower extremity amputation,  recent hospitalization for pneumonia and subsequent Cdiff colitis admitted on 7/6 from assisted living for evaluation of persistent abdominal pain, fever and diarrhea that has been ongoing despite the po vancomycin course that the patient had been on. The patient also notes mid epigastric/chest pain. History per daughter at bedside and medical record as patient is poor historian.  1. Patient admitted with severe Cdiff pan colitis, also noted with leukocytosis most likely reactive to Cdiff infection  - follow up cultures   - iv hydration and supportive care   - serial cbc and monitor temperature   - day #12 vancomycin po 500 mg po q 6 hours  - would complete another 14 days po vancomycin  - completed 10 days iv flagyl  - tolerating antibiotics without rashes or side effects   - continue dialysis per nephrology; critical care  - continue contact isolation  - limit antibiotics exposure  2. other issues: Afib, CHF, CAD s/p stents, COPD, PNA, upper GI bleed (duodenal), PVD, s/p right lower extremity amputation  - per medicine  If further ID issues please reconsult

## 2018-07-18 NOTE — PROGRESS NOTE ADULT - ASSESSMENT
82 y/o wm with hx of ckd stage 3 ( scr 1.5-1.7) with ARYA due to volume depletion from CDiff associated colitis and diarrhea in presence of diuretic use PTA.  Now with non anion gap metabolic acidosis and worsening renal parameters.  CXR with mild CHF with hx of diastolic CHF.    PLAN  - obtain abd and lactate  - will change ivf and add bicarbonate and keep at current rate  - hold lasix done.   - fu uop and scr closely and will monitor respiratory status  - bp and hr stable now, cardizem adjusted and lopressor added    7/9 MK  - ARYA: worsening renal parameters with metabolic acidosis, non ag.  Previous lactate WNL and since placed on bicarbonate drip  fu repeat abg today.  Increase IVF rate  possibility of HD discussed with enio, hcp daughter, pt has been agreeable in past.   DC hydralazine  dc morphine and change to dilaudid  - Cdiff with rising scr and worsening abd distension: CRS consult ongoing  possible repeat ct scan  DW Dr ballesteros    7/10  Anuric  anasarca  Non anion gap acidosis on bicarb drip  ARYA, anuric  CKD 3 history  d/w Family, and pt agreeable  called ICU for line placement and to dialyze the pt in ICU for monitoring    7/10  HD initiated via R temp HD catheter  tolerating well  no complaints  good abf    7/11 SY  --ARYA with Anasarca.  Urine output slightly improved.  However, remains quite fluid overloaded.  Will plan to continue HD until fluid status is much improved.  HD order written.  3 hour tx today.  Will aim to remove 2.5 kg as tolerated.  --C DIff colitis ; continue to monitor closely.    7/12 SY  --ARYA with Anasarca.   Remains Oligo-anuric.    --HD with goal of 2.5 kg UF again today.  --C Diff colitis.   Clinically improved   Continue to monitor.    7/13  The patient was dialyzed 3 days in a row this week  Discussed with the patient's daughter and hospitalist, intensivist  Will skip dialysis today, no urgent need for dialysis  We'll dialyze the patient tomorrow on Saturday and then skip Sunday to dialyze the patient again on Monday.    7/14  We'll dialyze against 4 K bath  Case discussed with the patient's daughter  May need a permanent catheter by Monday      7/15  Dialyze with a 4K bath  Potassium is 3.3 most likely from the diarrhea  Schedule for permacath placement after dialysis on Monday or Tuesday  Patient is comfortable no complications noted at this time    7/16 MK  - ARYA/ CKD stage 3 remains oliguirc and HD dependent.  LImited UF at HD toleated with the   hypokalemia corrected with HD.  Permcath planned today  - Cdiff: on PO vanc.  improved leukocytosis and abd distension     7/17  s/p perm cath  HD tomorrow  4 K bath  replace K   overall stable    7/18 SY  --ARYA/CKD for now remaining dialysis dependent.  Continue TIW HD.  --C Diff colitis ; improving clinically.

## 2018-07-18 NOTE — PROGRESS NOTE ADULT - SUBJECTIVE AND OBJECTIVE BOX
Cardiology Progress Note  Patient is a 83y old  Male who presents with a chief complaint of complain of diarrhea, fever.     HPI: Pt is a 84 y/o M w/pmhx of Afib, CHF, CAD s/p stents, COPD, PNA, upper GI bleed (duodenal)  BIB EMS from [facility] assisted living for fever, abd pain, and diarrhea that began 3 weeks ago. Was in the hospital for PNA tx and was later dx with C-diff and started on a course of PO vancomycin for 10 days for the C-diff. States that he has had diarrhea since before the course of abx. Pain has been increased for the past few weeks and is characterized as a cramping feeling. Daughters also note that there is increased distension.     7/10- pt seen and examined today am.  Daughter at bedside.     7/11- pt seen and examined by me today.  He denies any symptoms. Much alert today. NG tube in place./  Daughter at bedside.    7/16- Case d/w nursing, pt and daughter. Rectal tube placed. No CP/SOB. Continues to have diarrhea but mildly improved.     7/17- Continues to have rectal/abd pain. No CP/SOB. S/p permacath placement yesterday as well.     7/18- No CP/SOB. Still weak, but feels slightly better today. No fevers. Case d/w daughter as well. SR on tele.     PAST MEDICAL & SURGICAL HISTORY:  Diastolic CHF  Peripheral vascular disease  Afib  Anemia  CKD (chronic kidney disease)  COPD (chronic obstructive pulmonary disease)  SHAYY (obstructive sleep apnea)  Sepsis, due to unspecified organism: 2/2 poorly healing wounds b/l  Dyspepsia: On moderate exertion.  Sleep apnea, obstructive: Requires home 02 therapy, and treatment with BIPAP  Atelectasis  Pleural effusion, bilateral  Respiratory failure  Peripheral edema  CRI (chronic renal insufficiency)  Gout  Benign prostatic hypertrophy  Spinal stenosis  Hypercholesterolemia  GERD (gastroesophageal reflux disease)  CAD (coronary artery disease)  Hypertension  S/P angioplasty with stent  Cataract of left eye  Prostate: Surgery green light procedure.  S/P rotator cuff surgery: Right  S/P angioplasty      MEDICATIONS  (STANDING):  allopurinol 100 milliGRAM(s) Oral daily  buDESOnide   0.5 milliGRAM(s) Respule 0.5 milliGRAM(s) Inhalation two times a day  dextrose 5% 1000 milliLiter(s) (75 mL/Hr) IV Continuous <Continuous>  diltiazem    Tablet 30 milliGRAM(s) Oral every 6 hours  doxazosin 8 milliGRAM(s) Oral at bedtime  ferrous    sulfate 325 milliGRAM(s) Oral daily  finasteride 5 milliGRAM(s) Oral daily  hydrALAZINE 50 milliGRAM(s) Oral two times a day  isosorbide   mononitrate ER Tablet (IMDUR) 120 milliGRAM(s) Oral daily  metoprolol tartrate 25 milliGRAM(s) Oral two times a day  metroNIDAZOLE  IVPB      metroNIDAZOLE  IVPB 500 milliGRAM(s) IV Intermittent every 8 hours  simvastatin 10 milliGRAM(s) Oral at bedtime  vancomycin    Solution 500 milliGRAM(s) Oral every 6 hours    MEDICATIONS  (PRN):  acetaminophen   Tablet 650 milliGRAM(s) Oral every 8 hours PRN For Temp greater than 38 C (100.4 F)  acetaminophen   Tablet. 650 milliGRAM(s) Oral every 8 hours PRN Mild Pain (1 - 3)  ALBUTerol   0.5% 2.5 milliGRAM(s) Nebulizer every 4 hours PRN Shortness of Breath and/or Wheezing  morphine  - Injectable 2 milliGRAM(s) IV Push every 6 hours PRN Severe Pain (7 - 10)  ondansetron Injectable 4 milliGRAM(s) IV Push every 6 hours PRN Nausea and/or Vomiting    FAMILY HISTORY: No pertinent family history in first degree relatives    SOCIAL HISTORY: as above.    REVIEW OF SYSTEMS:  CONSTITUTIONAL:    No fatigue, malaise, lethargy.  No fever or chills.  HEENT:  Eyes:  No visual changes.     ENT:  No epistaxis.  No sinus pain.    RESPIRATORY:  No cough.  No wheeze.  No hemoptysis.  No shortness of breath.  CARDIOVASCULAR:  No chest pains.  No palpitations. No shortness of breath, No orthopnea or PND.  GASTROINTESTINAL:  no abdominal pain.  No nausea or vomiting.    GENITOURINARY:    No hematuria.    MUSCULOSKELETAL:  No musculoskeletal pain.  No joint swelling.  No arthritis.  NEUROLOGICAL:  No tingling or numbness or weakness.  PSYCHIATRIC:  No confusion    Vital Signs Last 24 Hrs  T(C): 36.4 (09 Jul 2018 04:00), Max: 36.7 (08 Jul 2018 11:40)  T(F): 97.6 (09 Jul 2018 04:00), Max: 98.1 (08 Jul 2018 11:40)  HR: 66 (09 Jul 2018 07:40) (66 - 80)  BP: 115/23 (09 Jul 2018 04:00) (109/28 - 137/33)  BP(mean): --  RR: 16 (09 Jul 2018 04:00) (16 - 18)  SpO2: 93% (09 Jul 2018 04:00) (91% - 95%)    PHYSICAL EXAM-    Constitutional: ill looking elderly male in no acute distress.     Head: Head is normocephalic and atraumatic.      Neck: NG tube in place    Cardiovascular: Regular rate and rhythm without S3, S4. No murmurs or rubs are appreciated.      Respiratory: Breath sounds are normal. No rales. No wheezing.    Abdomen: Soft, nontender, distended with positive bowel sounds.      Extremity: No tenderness. No  pitting edema     Neurologic: The patient is alert and oriented.      INTERPRETATION OF TELEMETRY: sinus rythm, NSVT, bigeminy    ECG:    I&O's Detail      LABS:                        8.1    20.78 )-----------( 239      ( 09 Jul 2018 05:48 )             25.6     07-09    144  |  114<H>  |  65<H>  ----------------------------<  113<H>  4.5   |  17<L>  |  3.90<H>    Ca    7.1<L>      09 Jul 2018 05:48  Mg     1.9     07-09    CARDIAC MARKERS ( 07 Jul 2018 17:51 )  0.024 ng/mL / x     / x     / x     / x        I&O's Summary    BNP  RADIOLOGY & ADDITIONAL STUDIES:    < from: Transthoracic Echocardiogram (07.10.18 @ 10:21) >     Fibrocalcific changes noted to the mitral valve leaflets with preserved   leaflet excursion.   Moderate (2+) mitral regurgitation is present.   The left atrium is moderately dilated.   Mild concentric left ventricular hypertrophy is present.   Estimated left ventricular ejection fraction is 55-60 %.    < end of copied text >

## 2018-07-18 NOTE — PROGRESS NOTE ADULT - PROBLEM SELECTOR PLAN 3
s/p Tunnel Cath on 7/16/18, patient currently HD dependent  -Nephro appreciated, fluid balance improving, HD TIW

## 2018-07-18 NOTE — PROGRESS NOTE ADULT - SUBJECTIVE AND OBJECTIVE BOX
CHIEF COMPLAINT: Diarrhea/Fever    SUBJECTIVE: Pt is a 84 y/o M w/pmhx of Afib, CHF, CAD s/p stents, COPD, recent PNA on abx, upper GI bleed (duodenal)  BIB EMS from [facility] for fever, abd pain, and diarrhea that began 3 weeks ago. Was in the hospital for PNA tx and was later dx with C-diff and started on a course of PO vancomycin for 10 days for the C-diff. States that he has had diarrhea since before the course of abx. Pain has been increased for the past few weeks and is characterized as a cramping feeling. Daughters also note that there is increased distension. Also notes chest pain characterized as a cramping in the central chest. 83% O2 sat noted morning of admission on 7/6/18, at the assisted living facility. Takes O2 routinely at night but not during the day.    7/18/18 - Patient seen and examined in ICU bed, under hospitalist care. Patient is unable to respond to questions, weak. Vitals are stable, afebrile. Nephro note appreciated, over the course of the stay patient developed Renal Insufficiency for the first time and underwent tunnel cath on 7/16/18 for HD TIW. Patient noted to be on HD at bedside today, HD was for an episode of fluid overload 2/2 renal insufficiency as well as HFpEF that caused resp. failure. ID note appreciated, continue on Oral Vancomycin, today is day 12, Flagyl d/c'd yesterday. Cardio is on board, EKG showing mobitz type II, EP consulted, likely vagal response, may need SHAYY study outpatient, continue Cardizem long acting on d/c, keep K+ above 4, magnesium above 2.    REVIEW OF SYSTEMS:    CONSTITUTIONAL: No weakness, fevers or chills  EYES/ENT: No visual changes;  No vertigo or throat pain   NECK: No pain or stiffness  RESPIRATORY: No cough, wheezing, hemoptysis; No shortness of breath  CARDIOVASCULAR: No chest pain or palpitations  GASTROINTESTINAL: No abdominal or epigastric pain. No nausea, vomiting, or hematemesis; No diarrhea or constipation. No melena or hematochezia.  GENITOURINARY: No dysuria, frequency or hematuria  NEUROLOGICAL: No numbness or weakness  SKIN: No itching, burning, rashes, or lesions   All other review of systems is negative unless indicated above    Vital Signs Last 24 Hrs  T(C): 37.7 (18 Jul 2018 12:00), Max: 37.7 (18 Jul 2018 12:00)  T(F): 99.8 (18 Jul 2018 12:00), Max: 99.8 (18 Jul 2018 12:00)  HR: 76 (18 Jul 2018 14:00) (60 - 84)  BP: 127/49 (18 Jul 2018 14:00) (100/39 - 139/33)  BP(mean): 66 (18 Jul 2018 14:00) (48 - 77)  RR: 23 (18 Jul 2018 14:00) (7 - 24)  SpO2: 95% (18 Jul 2018 14:00) (90% - 97%)    I&O's Summary    17 Jul 2018 07:01  -  18 Jul 2018 07:00  --------------------------------------------------------  IN: 340 mL / OUT: 0 mL / NET: 340 mL    18 Jul 2018 07:01  -  18 Jul 2018 15:05  --------------------------------------------------------  IN: 500 mL / OUT: 0 mL / NET: 500 mL        CAPILLARY BLOOD GLUCOSE          PHYSICAL EXAM:    Constitutional: NAD, awake and alert, well-developed  HEENT: PERR, EOMI, Normal Hearing, MMM  Neck: Soft and supple, No LAD, No JVD  Respiratory: Breath sounds are clear bilaterally, No wheezing, rales or rhonchi  Cardiovascular: S1 and S2, regular rate and rhythm, no Murmurs, gallops or rubs  Gastrointestinal: Bowel Sounds present, soft, nontender, nondistended, no guarding, no rebound  Extremities: No peripheral edema  Vascular: 2+ peripheral pulses  Neurological: A/O x 3, no focal deficits  Musculoskeletal: 5/5 strength b/l upper and lower extremities  Skin: No rashes    MEDICATIONS:  MEDICATIONS  (STANDING):  allopurinol 100 milliGRAM(s) Oral daily  aspirin  chewable 81 milliGRAM(s) Oral daily  buDESOnide   0.5 milliGRAM(s) Respule 0.5 milliGRAM(s) Inhalation two times a day  cholestyramine Powder (Sugar-Free) 4 Gram(s) Oral two times a day  diltiazem    Tablet 30 milliGRAM(s) Oral every 6 hours  doxazosin 8 milliGRAM(s) Oral at bedtime  finasteride 5 milliGRAM(s) Oral daily  heparin  Injectable 5000 Unit(s) SubCutaneous every 12 hours  isosorbide   mononitrate ER Tablet (IMDUR) 120 milliGRAM(s) Oral daily  metoprolol tartrate 25 milliGRAM(s) Oral two times a day  simvastatin 10 milliGRAM(s) Oral at bedtime  vancomycin    Solution 500 milliGRAM(s) Oral every 6 hours      LABS: All Labs Reviewed:                        8.3    9.89  )-----------( 211      ( 18 Jul 2018 05:11 )             27.0     07-18    144  |  109<H>  |  27<H>  ----------------------------<  103<H>  3.4<L>   |  25  |  3.65<H>    Ca    7.0<L>      18 Jul 2018 05:11            Blood Culture:     RADIOLOGY/EKG:    DVT PPX:    ADVANCED DIRECTIVE:    DISPOSITION: CHIEF COMPLAINT: Diarrhea/Fever    SUBJECTIVE: Pt is a 84 y/o M w/pmhx of Afib, CHF, CAD s/p stents, COPD, recent PNA on abx, upper GI bleed (duodenal)  BIB EMS from [facility] for fever, abd pain, and diarrhea that began 3 weeks ago. Was in the hospital for PNA tx and was later dx with C-diff and started on a course of PO vancomycin for 10 days for the C-diff. States that he has had diarrhea since before the course of abx. Pain has been increased for the past few weeks and is characterized as a cramping feeling. Daughters also note that there is increased distension. Also notes chest pain characterized as a cramping in the central chest. 83% O2 sat noted morning of admission on 7/6/18, at the assisted living facility. Takes O2 routinely at night but not during the day.    7/18/18 - Patient seen and examined in ICU bed, under hospitalist care. Patient is unable to respond to questions, weak/lethargic. Vitals are stable, afebrile. Nephro note appreciated, over the course of the stay patient developed Renal Insufficiency for the first time and underwent tunnel cath on 7/16/18 for HD TIW. Patient noted to be on HD at bedside today, HD was for an episode of fluid overload 2/2 renal insufficiency as well as HFpEF that caused resp. failure. ID note appreciated, continue on Oral Vancomycin, today is day 12, Flagyl d/c'd yesterday. Cardio is on board, EKG showing mobitz type II, EP consulted, likely vagal response, may need SHAYY study outpatient, continue Cardizem long acting on d/c, keep K+ above 4, magnesium above 2.    REVIEW OF SYSTEMS:  ROS unable to obtain, patient lethargic, but comfortable  CONSTITUTIONAL: No weakness, fevers or chills  EYES/ENT: No visual changes;  No vertigo or throat pain   NECK: No pain or stiffness  RESPIRATORY: No cough, wheezing, hemoptysis; No shortness of breath  CARDIOVASCULAR: No chest pain or palpitations  GASTROINTESTINAL: No abdominal or epigastric pain. No nausea, vomiting, or hematemesis; No diarrhea or constipation. No melena or hematochezia.  GENITOURINARY: No dysuria, frequency or hematuria  NEUROLOGICAL: No numbness or weakness  SKIN: No itching, burning, rashes, or lesions   All other review of systems is negative unless indicated above    Vital Signs Last 24 Hrs  T(C): 37.7 (18 Jul 2018 12:00), Max: 37.7 (18 Jul 2018 12:00)  T(F): 99.8 (18 Jul 2018 12:00), Max: 99.8 (18 Jul 2018 12:00)  HR: 76 (18 Jul 2018 14:00) (60 - 84)  BP: 127/49 (18 Jul 2018 14:00) (100/39 - 139/33)  BP(mean): 66 (18 Jul 2018 14:00) (48 - 77)  RR: 23 (18 Jul 2018 14:00) (7 - 24)  SpO2: 95% (18 Jul 2018 14:00) (90% - 97%)    I&O's Summary    17 Jul 2018 07:01  -  18 Jul 2018 07:00  --------------------------------------------------------  IN: 340 mL / OUT: 0 mL / NET: 340 mL    18 Jul 2018 07:01  -  18 Jul 2018 15:05  --------------------------------------------------------  IN: 500 mL / OUT: 0 mL / NET: 500 mL      PHYSICAL EXAM:    Constitutional: NAD, somnolent, frail appearing+  HEENT: PERR, EOMI, Normal Hearing, MMM  Neck: Soft and supple, No LAD, No JVD  Respiratory: Breath sounds are clear bilaterally, No wheezing, rales or rhonchi  Cardiovascular: S1 and S2, regular rate and rhythm, no Murmurs, gallops or rubs  Gastrointestinal: Bowel Sounds present, soft, nontender, Distended+, no guarding, no rebound  Extremities: LLE lesion with dressings c/d/i, RLE s/p AKA +  Vascular: 2+ peripheral pulses  Neurological: A/O x 3, no focal deficits  Musculoskeletal: unable to assess  Skin: No rashes    MEDICATIONS:  MEDICATIONS  (STANDING):  allopurinol 100 milliGRAM(s) Oral daily  aspirin  chewable 81 milliGRAM(s) Oral daily  buDESOnide   0.5 milliGRAM(s) Respule 0.5 milliGRAM(s) Inhalation two times a day  cholestyramine Powder (Sugar-Free) 4 Gram(s) Oral two times a day  diltiazem    Tablet 30 milliGRAM(s) Oral every 6 hours  doxazosin 8 milliGRAM(s) Oral at bedtime  finasteride 5 milliGRAM(s) Oral daily  heparin  Injectable 5000 Unit(s) SubCutaneous every 12 hours  isosorbide   mononitrate ER Tablet (IMDUR) 120 milliGRAM(s) Oral daily  metoprolol tartrate 25 milliGRAM(s) Oral two times a day  simvastatin 10 milliGRAM(s) Oral at bedtime  vancomycin    Solution 500 milliGRAM(s) Oral every 6 hours      LABS: All Labs Reviewed:                        8.3    9.89  )-----------( 211      ( 18 Jul 2018 05:11 )             27.0     07-18    144  |  109<H>  |  27<H>  ----------------------------<  103<H>  3.4<L>   |  25  |  3.65<H>    Ca    7.0<L>      18 Jul 2018 05:11            Blood Culture:   Culture - Urine (07.10.18 @ 16:30)    Specimen Source: .Urine Catheterized    Culture Results:   <10,000 CFU/ml  Normal Urogenital ravinder present    Culture - Blood (07.06.18 @ 13:42)    Specimen Source: .Blood Blood-Peripheral    Culture Results:   No growth at 5 days.      RADIOLOGY/EKG:  < from: Xray Chest 1 View- PORTABLE-Urgent (07.16.18 @ 18:34) >  IMPRESSION:    Findings suggesting persistent mild CHF with mild pulmonary vascular   congestion and trace fluid in the right minor fissure.    Stable right CVC.    < end of copied text >    < from: Transthoracic Echocardiogram (07.10.18 @ 10:21) >   Impression     Summary     Fibrocalcific changes noted to the mitral valve leaflets with preserved   leaflet excursion.   Moderate (2+) mitral regurgitation is present.   The left atrium is moderately dilated.   Mild concentric left ventricular hypertrophy is present.   Estimated left ventricular ejection fraction is 55-60 %.    < end of copied text >    DVT PPX: NO AC due to severe GI bleed in past

## 2018-07-18 NOTE — PROGRESS NOTE ADULT - ASSESSMENT
Pseudomembranous colitis status post recent antibiotics    By mouth vancomycin high-dose and Flagyl   case discussed with  family  improving  appetite may improve after DC flagyl, encourage PO intake        Patient had a recent course of C. difficile that was treated with vancomycin with a recurrence and he's had C. difficile in the past.  Due to severe C. difficile, would strongly consider a prolonged taper of vancomycin.  For now, would complete two-week course of vancomycin and then would taper her vancomycin over a long time.  Discussed with family.  cont diarrhea and will add cholestyramine and stop iron    A fibrillation with rapid ventricular rhythm, status post Cardizem drip.    Would hold anticoagulation secondary to history bleeding    COPD obesity, obstructive sleep apnea, coronary artery disease, overall comorbid risk factors     IVF per renal  HD as needed    Dw family

## 2018-07-18 NOTE — PROGRESS NOTE ADULT - SUBJECTIVE AND OBJECTIVE BOX
Patient is a 83y old  Male who presents with a chief complaint of complain of diarrhea, fever (06 Jul 2018 23:55)      HPI:  Pt is a 82 y/o M w/pmhx of Afib, CHF, CAD s/p stents, COPD, PNA, upper GI bleed (duodenal)  BIB EMS from [facility] assisted living for fever, abd pain, and diarrhea that began 3 weeks ago. Was in the hospital for PNA tx and was later dx with C-diff and started on a course of PO vancomycin for 10 days for the C-diff. States that he has had diarrhea since before the course of abx. Pain has been increased for the past few weeks and is characterized as a cramping feeling. Daughters also note that there is increased distension. Also notes chest pain characterized as a cramping in the central chest. 83% O2 sat noted this morning at the assisted living facility. Takes O2 routinely at night but not during the day. (06 Jul 2018 23:55)    Patient more comfortable. Negative nausea or vomiting. Decreased appetite but tolerating by mouth.  Negative chest pain or shortness of breath.  Feels little bit stronger. Urinated some through the night.  History is per patient and daughter.        PAST MEDICAL & SURGICAL HISTORY:  Diastolic CHF  Peripheral vascular disease  Afib  Anemia  CKD (chronic kidney disease)  COPD (chronic obstructive pulmonary disease)  SHAYY (obstructive sleep apnea)  Sepsis, due to unspecified organism: 2/2 poorly healing wounds b/l  Dyspepsia: On moderate exertion.  Sleep apnea, obstructive: Requires home 02 therapy, and treatment with BIPAP  Atelectasis  Pleural effusion, bilateral  Respiratory failure  Peripheral edema  CRI (chronic renal insufficiency)  Gout  Benign prostatic hypertrophy  Spinal stenosis  Hypercholesterolemia  GERD (gastroesophageal reflux disease)  CAD (coronary artery disease)  Hypertension  S/P angioplasty with stent  Cataract of left eye  Prostate: Surgery green light procedure.  S/P rotator cuff surgery: Right  S/P angioplasty      MEDICATIONS  (STANDING):  allopurinol 100 milliGRAM(s) Oral daily  aspirin  chewable 81 milliGRAM(s) Oral daily  buDESOnide   0.5 milliGRAM(s) Respule 0.5 milliGRAM(s) Inhalation two times a day  cholestyramine Powder (Sugar-Free) 4 Gram(s) Oral two times a day  diltiazem    Tablet 30 milliGRAM(s) Oral every 6 hours  doxazosin 8 milliGRAM(s) Oral at bedtime  finasteride 5 milliGRAM(s) Oral daily  heparin  Injectable 5000 Unit(s) SubCutaneous every 12 hours  isosorbide   mononitrate ER Tablet (IMDUR) 120 milliGRAM(s) Oral daily  metoprolol tartrate 25 milliGRAM(s) Oral two times a day  simvastatin 10 milliGRAM(s) Oral at bedtime  vancomycin    Solution 500 milliGRAM(s) Oral every 6 hours    MEDICATIONS  (PRN):  acetaminophen   Tablet 650 milliGRAM(s) Oral every 8 hours PRN For Temp greater than 38 C (100.4 F)  acetaminophen   Tablet. 650 milliGRAM(s) Oral every 8 hours PRN Mild Pain (1 - 3)  albumin human 25% IVPB 50 milliLiter(s) IV Intermittent <User Schedule> PRN for SBP</=120  ALBUTerol   0.5% 2.5 milliGRAM(s) Nebulizer every 4 hours PRN Shortness of Breath and/or Wheezing  diphenhydrAMINE   Capsule 25 milliGRAM(s) Oral at bedtime PRN sleep  HYDROmorphone  Injectable 0.5 milliGRAM(s) IV Push every 6 hours PRN Severe Pain (7 - 10)  ondansetron Injectable 4 milliGRAM(s) IV Push every 6 hours PRN Nausea and/or Vomiting      Allergies    Zosyn (Rash)    Intolerances        SOCIAL HISTORY:NC    FAMILY HISTORY:  No pertinent family history in first degree relatives      REVIEW OF SYSTEMS:    CONSTITUTIONAL: No weakness, fevers or chills  EYES/ENT: No visual changes;  No vertigo or throat pain   NECK: No pain or stiffness  RESPIRATORY: No cough, wheezing, hemoptysis; No shortness of breath  CARDIOVASCULAR: No chest pain or palpitations  GENITOURINARY: No dysuria, frequency or hematuria  NEUROLOGICAL: No numbness or weakness  SKIN: No itching, burning, rashes, or lesions   All other review of systems is negative unless indicated above.    Vital Signs Last 24 Hrs  T(C): 36.2 (18 Jul 2018 08:00), Max: 37.3 (17 Jul 2018 13:00)  T(F): 97.1 (18 Jul 2018 08:00), Max: 99.2 (18 Jul 2018 06:00)  HR: 78 (18 Jul 2018 08:00) (60 - 84)  BP: 116/45 (18 Jul 2018 08:00) (94/34 - 137/47)  BP(mean): 62 (18 Jul 2018 08:00) (45 - 79)  RR: 21 (18 Jul 2018 08:00) (7 - 24)  SpO2: 91% (18 Jul 2018 08:00) (90% - 97%)    PHYSICAL EXAM:    Constitutional: NAD, well-developed  HEENT: EOMI, throat clear  Neck: No LAD, supple  Respiratory: CTA and P  Cardiovascular: S1 and S2, RRR, no M  Gastrointestinal: BS+, soft, NT/mild distension, neg HSM,  Extremities: No peripheral edema, neg clubing, cyanosis    Neurological: A/O x 3, no focal deficits  Psychiatric: Normal mood, normal affect  Skin: No rashes    LABS:  CBC Full  -  ( 18 Jul 2018 05:11 )  WBC Count : 9.89 K/uL  Hemoglobin : 8.3 g/dL  Hematocrit : 27.0 %  Platelet Count - Automated : 211 K/uL  Mean Cell Volume : 91.2 fl  Mean Cell Hemoglobin : 28.0 pg  Mean Cell Hemoglobin Concentration : 30.7 gm/dL  Auto Neutrophil # : 7.98 K/uL  Auto Lymphocyte # : 0.59 K/uL  Auto Monocyte # : 0.77 K/uL  Auto Eosinophil # : 0.43 K/uL  Auto Basophil # : 0.04 K/uL  Auto Neutrophil % : 80.7 %  Auto Lymphocyte % : 6.0 %  Auto Monocyte % : 7.8 %  Auto Eosinophil % : 4.3 %  Auto Basophil % : 0.4 %    07-18    144  |  109<H>  |  27<H>  ----------------------------<  103<H>  3.4<L>   |  25  |  3.65<H>    Ca    7.0<L>      18 Jul 2018 05:11      PT/INR - ( 16 Jul 2018 12:10 )   PT: 12.7 sec;   INR: 1.17 ratio         PTT - ( 16 Jul 2018 12:10 )  PTT:28.2 sec    07-10 @ 16:30  Hep A Igm Nonreact  Hep A total ab, IgA and M --  Hep B core Ab total --  Hep B core IgM Nonreact  Hep B DNA PCR --  Hep BSAg --  Hep BSAb --  Hep BSAb --  HCV Ab --  HCV RNA Log --  HCV RNA interp --                RADIOLOGY & ADDITIONAL STUDIES:

## 2018-07-18 NOTE — PROGRESS NOTE ADULT - ASSESSMENT
82 y/o M w/pmhx of Afib, CHF, CAD s/p stents, COPD, PNA, upper GI bleed (duodenal)  BIB EMS from [facility] assisted living for fever, abd pain, and diarrhea that began 3 weeks ago. Was in the hospital for PNA tx and was later dx with C-diff and started on a course of PO vancomycin for 10 days for C-diff. Cardiology called for abnormal tele findings, abnormal EKG.     1. Mobitz II- EP consult appreciated. No recurrent events. He will need reevaluation for sleep apnea as outpt. Bradycardia thought to be vagally induced.   Now SR.    2. NSVT, bigeminy- keep K>4, Mag>2. Cont CCB for now. Cont to follow on tele. Echo with normal LV fxn, mod MR.    3. Renal  insufficiency - HD as per renal. S/p permacath placement yesterday.     4. Abdominal pain - Management pre primary team. Related to CDiff- cont abx. GI following.    5. Atrial fibrillation- off anticoagulation secondary to severe GI bleed in past requiring 15 units of pRBCs.  Now in SR. Currently on CCB/Bbl. HR well controlled in 60s-70s. Can change to long active cardizem upon discharge. Cont  Bbl as well.     6. Awaiting out of ICU. DVT proph. Pain control with PRN dilaudid.

## 2018-07-18 NOTE — PROGRESS NOTE ADULT - SUBJECTIVE AND OBJECTIVE BOX
Patient is a 83y old  Male who presents with a chief complaint of complain of diarrhea, fever (06 Jul 2018 23:55)  Date of service: 07-18-18 @ 10:07    Lying in bed, overall feeling better    ROS: no fever or chills; denies dizziness, no HA, no SOB or cough, no abdominal pain, no  constipation; no dysuria, no urinary frequency, no legs pain, no rashes    MEDICATIONS  (STANDING):  allopurinol 100 milliGRAM(s) Oral daily  aspirin  chewable 81 milliGRAM(s) Oral daily  buDESOnide   0.5 milliGRAM(s) Respule 0.5 milliGRAM(s) Inhalation two times a day  cholestyramine Powder (Sugar-Free) 4 Gram(s) Oral two times a day  diltiazem    Tablet 30 milliGRAM(s) Oral every 6 hours  doxazosin 8 milliGRAM(s) Oral at bedtime  finasteride 5 milliGRAM(s) Oral daily  heparin  Injectable 5000 Unit(s) SubCutaneous every 12 hours  isosorbide   mononitrate ER Tablet (IMDUR) 120 milliGRAM(s) Oral daily  metoprolol tartrate 25 milliGRAM(s) Oral two times a day  potassium chloride  10 mEq/100 mL IVPB 10 milliEquivalent(s) IV Intermittent once  simvastatin 10 milliGRAM(s) Oral at bedtime  vancomycin    Solution 500 milliGRAM(s) Oral every 6 hours    MEDICATIONS  (PRN):  acetaminophen   Tablet 650 milliGRAM(s) Oral every 8 hours PRN For Temp greater than 38 C (100.4 F)  acetaminophen   Tablet. 650 milliGRAM(s) Oral every 8 hours PRN Mild Pain (1 - 3)  albumin human 25% IVPB 50 milliLiter(s) IV Intermittent <User Schedule> PRN for SBP</=120  ALBUTerol   0.5% 2.5 milliGRAM(s) Nebulizer every 4 hours PRN Shortness of Breath and/or Wheezing  diphenhydrAMINE   Capsule 25 milliGRAM(s) Oral at bedtime PRN sleep  HYDROmorphone  Injectable 0.5 milliGRAM(s) IV Push every 6 hours PRN Severe Pain (7 - 10)  ondansetron Injectable 4 milliGRAM(s) IV Push every 6 hours PRN Nausea and/or Vomiting      Vital Signs Last 24 Hrs  T(C): 36.2 (18 Jul 2018 08:00), Max: 37.3 (17 Jul 2018 13:00)  T(F): 97.1 (18 Jul 2018 08:00), Max: 99.2 (18 Jul 2018 06:00)  HR: 78 (18 Jul 2018 08:00) (60 - 84)  BP: 116/45 (18 Jul 2018 08:00) (100/39 - 137/47)  BP(mean): 62 (18 Jul 2018 08:00) (45 - 79)  RR: 21 (18 Jul 2018 08:00) (7 - 24)  SpO2: 91% (18 Jul 2018 08:00) (90% - 97%)    Physical Exam:        PE:    Constitutional: frail looking  HEENT: NC/AT, EOMI, PERRLA, conjunctivae clear; ears and nose atraumatic; pharynx clear  Neck: supple; thyroid not palpable  Respiratory: respiratory effort normal; clear to auscultation  Cardiovascular: S1S2 regular, no murmurs  Abdomen: soft, non tender, difusely distended, positive BS; no liver or spleen organomegaly  Genitourinary: no suprapubic tenderness  Musculoskeletal: s/p right lower extremity amputation; left lower extremity with dressing in place  Neurological/ Psychiatric: AxOx3, judgement and insight normal;  moving all extremities  Skin: no rashes; no palpable lesions    Labs: all available labs reviewed                        Labs:                         Labs:                         Labs:                      Labs:                        8.3    9.89  )-----------( 211      ( 18 Jul 2018 05:11 )             27.0     07-18    144  |  109<H>  |  27<H>  ----------------------------<  103<H>  3.4<L>   |  25  |  3.65<H>    Ca    7.0<L>      18 Jul 2018 05:11             Cultures:                  Clostridium difficile Toxin by PCR (07.06.18 @ 16:19)    C Diff by PCR Result: Detected    Clostridium difficile Toxin by PCR: The results of this test should be interpreted with consideration of all  clinical and laboratory findings. This test determines the presence of  the C. difficile tcdB gene at a given time and is not intended to  identify antibiotic associated disease or C. difficile infection without  clinical context. Successful treatment is based on the resolution of  clinical symptoms. This test should not be used as a "test of cure"  because C. difficile DNA will persist after successful treatment. Repeat  testing will not be permitted.    This test is performed on the BD MAX system using Real-Time PCR and  fluorogenic target-specific hybridization.        < from: CT Abdomen and Pelvis w/ Oral Cont (07.06.18 @ 18:03) >    IMPRESSION:     Severe pancolitis consistent with pseudomembranous colitis.    Mild pulmonary edema.    Small loculated right and trace layering left pleural effusions.      < end of copied text >          Radiology: all available radiological tests reviewed    Advanced directives addressed: full resuscitation

## 2018-07-18 NOTE — PROGRESS NOTE ADULT - SUBJECTIVE AND OBJECTIVE BOX
NEPHROLOGY INTERVAL HPI/OVERNIGHT EVENTS:  7/18 SY  S/p Permcath placement.  No complications.  Reports some improvement in diarrhea.  Denies SOB.  Denies abdominal pain.    7/17  feels well  no complaints   s/p permacath placement  tolerated well  next hd wednesday    7/15  Patient in ICU  Patient's daughter at the bedside  Case discussed with them as well  Overall he feels well  Is able to tolerate some oral intake  Dialysis due tomorrow    7/14  The patient seen on dialysis  Temporary catheter not working well  High pressures  Discussed with patient's daughter that mostly likely by Monday the patient may require placement of a permanent catheter.        MEDICATIONS  (STANDING):  allopurinol 100 milliGRAM(s) Oral daily  aspirin  chewable 81 milliGRAM(s) Oral daily  buDESOnide   0.5 milliGRAM(s) Respule 0.5 milliGRAM(s) Inhalation two times a day  cholestyramine Powder (Sugar-Free) 4 Gram(s) Oral two times a day  diltiazem    Tablet 30 milliGRAM(s) Oral every 6 hours  doxazosin 8 milliGRAM(s) Oral at bedtime  finasteride 5 milliGRAM(s) Oral daily  heparin  Injectable 5000 Unit(s) SubCutaneous every 12 hours  isosorbide   mononitrate ER Tablet (IMDUR) 120 milliGRAM(s) Oral daily  metoprolol tartrate 25 milliGRAM(s) Oral two times a day  simvastatin 10 milliGRAM(s) Oral at bedtime  vancomycin    Solution 500 milliGRAM(s) Oral every 6 hours    MEDICATIONS  (PRN):  acetaminophen   Tablet 650 milliGRAM(s) Oral every 8 hours PRN For Temp greater than 38 C (100.4 F)  acetaminophen   Tablet. 650 milliGRAM(s) Oral every 8 hours PRN Mild Pain (1 - 3)  albumin human 25% IVPB 50 milliLiter(s) IV Intermittent <User Schedule> PRN for SBP</=120  ALBUTerol   0.5% 2.5 milliGRAM(s) Nebulizer every 4 hours PRN Shortness of Breath and/or Wheezing  diphenhydrAMINE   Capsule 25 milliGRAM(s) Oral at bedtime PRN sleep  HYDROmorphone  Injectable 0.5 milliGRAM(s) IV Push every 6 hours PRN Severe Pain (7 - 10)  ondansetron Injectable 4 milliGRAM(s) IV Push every 6 hours PRN Nausea and/or Vomiting          Vital Signs Last 24 Hrs  T(C): 36.2 (18 Jul 2018 08:00), Max: 37.3 (17 Jul 2018 13:00)  T(F): 97.1 (18 Jul 2018 08:00), Max: 99.2 (18 Jul 2018 06:00)  HR: 79 (18 Jul 2018 10:00) (60 - 84)  BP: 118/37 (18 Jul 2018 10:00) (100/39 - 137/47)  BP(mean): 58 (18 Jul 2018 10:00) (45 - 79)  RR: 22 (18 Jul 2018 10:00) (7 - 24)  SpO2: 95% (18 Jul 2018 10:00) (90% - 97%)  Daily     Daily     07-17 @ 07:01  -  07-18 @ 07:00  --------------------------------------------------------  IN: 340 mL / OUT: 0 mL / NET: 340 mL        PHYSICAL EXAM:  Alert and appropriate  GENERAL: No distress  CHEST/LUNG: Fair air entry  HEART: S1S2 RRR  ABDOMEN: distended  EXTREMITIES: improved edema  SKIN:     LABS:                        8.3    9.89  )-----------( 211      ( 18 Jul 2018 05:11 )             27.0     07-18    144  |  109<H>  |  27<H>  ----------------------------<  103<H>  3.4<L>   |  25  |  3.65<H>    Ca    7.0<L>      18 Jul 2018 05:11      PT/INR - ( 16 Jul 2018 12:10 )   PT: 12.7 sec;   INR: 1.17 ratio         PTT - ( 16 Jul 2018 12:10 )  PTT:28.2 sec            RADIOLOGY & ADDITIONAL TESTS:

## 2018-07-18 NOTE — PROGRESS NOTE ADULT - PROBLEM SELECTOR PLAN 2
-RESOLVED    -continue to maintain fluid balance with HD  -control HFpEF with medical management - Toprol 25mg BID

## 2018-07-18 NOTE — PROGRESS NOTE ADULT - ASSESSMENT
83y M with Sepsis 2/2 severe and recurrent Pseudomembranous Colitis, developed Renal Insufficiency HD dependent, and Acute Hypoxic Respiratory Failure 2/2 Fluid Overload from RF and HFpEF

## 2018-07-19 LAB
ANION GAP SERPL CALC-SCNC: 5 MMOL/L — SIGNIFICANT CHANGE UP (ref 5–17)
BUN SERPL-MCNC: 20 MG/DL — SIGNIFICANT CHANGE UP (ref 7–23)
CALCIUM SERPL-MCNC: 7.2 MG/DL — LOW (ref 8.5–10.1)
CHLORIDE SERPL-SCNC: 105 MMOL/L — SIGNIFICANT CHANGE UP (ref 96–108)
CO2 SERPL-SCNC: 30 MMOL/L — SIGNIFICANT CHANGE UP (ref 22–31)
CREAT SERPL-MCNC: 2.92 MG/DL — HIGH (ref 0.5–1.3)
FERRITIN SERPL-MCNC: 336 NG/ML — SIGNIFICANT CHANGE UP (ref 30–400)
GLUCOSE SERPL-MCNC: 85 MG/DL — SIGNIFICANT CHANGE UP (ref 70–99)
HCT VFR BLD CALC: 26.6 % — LOW (ref 39–50)
HGB BLD-MCNC: 8.2 G/DL — LOW (ref 13–17)
IRON SATN MFR SERPL: 17 % — SIGNIFICANT CHANGE UP (ref 16–55)
IRON SATN MFR SERPL: 25 UG/DL — LOW (ref 45–165)
MAGNESIUM SERPL-MCNC: 1.8 MG/DL — SIGNIFICANT CHANGE UP (ref 1.6–2.6)
MCHC RBC-ENTMCNC: 28.4 PG — SIGNIFICANT CHANGE UP (ref 27–34)
MCHC RBC-ENTMCNC: 30.8 GM/DL — LOW (ref 32–36)
MCV RBC AUTO: 92 FL — SIGNIFICANT CHANGE UP (ref 80–100)
NRBC # BLD: 0 /100 WBCS — SIGNIFICANT CHANGE UP (ref 0–0)
PLATELET # BLD AUTO: 225 K/UL — SIGNIFICANT CHANGE UP (ref 150–400)
POTASSIUM SERPL-MCNC: 3 MMOL/L — LOW (ref 3.5–5.3)
POTASSIUM SERPL-SCNC: 3 MMOL/L — LOW (ref 3.5–5.3)
RBC # BLD: 2.89 M/UL — LOW (ref 4.2–5.8)
RBC # FLD: 20.5 % — HIGH (ref 10.3–14.5)
SODIUM SERPL-SCNC: 140 MMOL/L — SIGNIFICANT CHANGE UP (ref 135–145)
TIBC SERPL-MCNC: 149 UG/DL — LOW (ref 220–430)
UIBC SERPL-MCNC: 124 UG/DL — SIGNIFICANT CHANGE UP (ref 110–370)
VIT B12 SERPL-MCNC: 1580 PG/ML — HIGH (ref 232–1245)
WBC # BLD: 8.69 K/UL — SIGNIFICANT CHANGE UP (ref 3.8–10.5)
WBC # FLD AUTO: 8.69 K/UL — SIGNIFICANT CHANGE UP (ref 3.8–10.5)

## 2018-07-19 RX ORDER — IRON SUCROSE 20 MG/ML
100 INJECTION, SOLUTION INTRAVENOUS
Qty: 0 | Refills: 0 | Status: DISCONTINUED | OUTPATIENT
Start: 2018-07-19 | End: 2018-07-19

## 2018-07-19 RX ORDER — SODIUM FERRIC GLUCONAT/SUCROSE 62.5MG/5ML
125 AMPUL (ML) INTRAVENOUS
Qty: 0 | Refills: 0 | Status: DISCONTINUED | OUTPATIENT
Start: 2018-07-19 | End: 2018-08-09

## 2018-07-19 RX ORDER — POTASSIUM CHLORIDE 20 MEQ
40 PACKET (EA) ORAL ONCE
Qty: 0 | Refills: 0 | Status: COMPLETED | OUTPATIENT
Start: 2018-07-19 | End: 2018-07-19

## 2018-07-19 RX ORDER — POTASSIUM CHLORIDE 20 MEQ
10 PACKET (EA) ORAL
Qty: 0 | Refills: 0 | Status: DISCONTINUED | OUTPATIENT
Start: 2018-07-19 | End: 2018-07-19

## 2018-07-19 RX ADMIN — HYDROMORPHONE HYDROCHLORIDE 0.5 MILLIGRAM(S): 2 INJECTION INTRAMUSCULAR; INTRAVENOUS; SUBCUTANEOUS at 08:48

## 2018-07-19 RX ADMIN — Medication 500 MILLIGRAM(S): at 06:00

## 2018-07-19 RX ADMIN — Medication 25 MILLIGRAM(S): at 22:12

## 2018-07-19 RX ADMIN — HEPARIN SODIUM 5000 UNIT(S): 5000 INJECTION INTRAVENOUS; SUBCUTANEOUS at 19:01

## 2018-07-19 RX ADMIN — Medication 500 MILLIGRAM(S): at 16:29

## 2018-07-19 RX ADMIN — Medication 100 MILLIGRAM(S): at 16:29

## 2018-07-19 RX ADMIN — CHOLESTYRAMINE 4 GRAM(S): 4 POWDER, FOR SUSPENSION ORAL at 10:24

## 2018-07-19 RX ADMIN — FINASTERIDE 5 MILLIGRAM(S): 5 TABLET, FILM COATED ORAL at 16:29

## 2018-07-19 RX ADMIN — Medication 0.5 MILLIGRAM(S): at 19:59

## 2018-07-19 RX ADMIN — Medication 500 MILLIGRAM(S): at 00:36

## 2018-07-19 RX ADMIN — HEPARIN SODIUM 5000 UNIT(S): 5000 INJECTION INTRAVENOUS; SUBCUTANEOUS at 06:00

## 2018-07-19 RX ADMIN — ISOSORBIDE MONONITRATE 120 MILLIGRAM(S): 60 TABLET, EXTENDED RELEASE ORAL at 16:28

## 2018-07-19 RX ADMIN — Medication 25 MILLIGRAM(S): at 10:25

## 2018-07-19 RX ADMIN — HYDROMORPHONE HYDROCHLORIDE 0.5 MILLIGRAM(S): 2 INJECTION INTRAMUSCULAR; INTRAVENOUS; SUBCUTANEOUS at 16:45

## 2018-07-19 RX ADMIN — SIMVASTATIN 10 MILLIGRAM(S): 20 TABLET, FILM COATED ORAL at 22:12

## 2018-07-19 RX ADMIN — HYDROMORPHONE HYDROCHLORIDE 0.5 MILLIGRAM(S): 2 INJECTION INTRAMUSCULAR; INTRAVENOUS; SUBCUTANEOUS at 16:31

## 2018-07-19 RX ADMIN — Medication 81 MILLIGRAM(S): at 16:29

## 2018-07-19 RX ADMIN — Medication 500 MILLIGRAM(S): at 22:11

## 2018-07-19 RX ADMIN — Medication 8 MILLIGRAM(S): at 22:12

## 2018-07-19 RX ADMIN — Medication 25 MILLIGRAM(S): at 00:36

## 2018-07-19 RX ADMIN — Medication 40 MILLIEQUIVALENT(S): at 10:25

## 2018-07-19 RX ADMIN — HYDROMORPHONE HYDROCHLORIDE 0.5 MILLIGRAM(S): 2 INJECTION INTRAMUSCULAR; INTRAVENOUS; SUBCUTANEOUS at 09:10

## 2018-07-19 NOTE — PROGRESS NOTE ADULT - SUBJECTIVE AND OBJECTIVE BOX
CHIEF COMPLAINT: Diarrhea/Fever    SUBJECTIVE: Pt is a 84 y/o M w/pmhx of Afib, CHF, CAD s/p stents, COPD, recent PNA on abx, upper GI bleed (duodenal)  BIB EMS from [facility] for fever, abd pain, and diarrhea that began 3 weeks ago. Was in the hospital for PNA tx and was later dx with C-diff and started on a course of PO vancomycin for 10 days for the C-diff. States that he has had diarrhea since before the course of abx. Pain has been increased for the past few weeks and is characterized as a cramping feeling. Daughters also note that there is increased distension. Also notes chest pain characterized as a cramping in the central chest. 83% O2 sat noted morning of admission on 7/6/18, at the assisted living facility. Takes O2 routinely at night but not during the day.    7/19/18 - Patient seen and examined in ICU bed, with daughter present. Patient is awake/alert and oriented but is feeling very weak. Denies any abdominal pain. Fecal bag output decreasing, stools are becoming more formed as per RN. Case d/w Dr. Hernández, patient is likely going to be on extended taper, still having SOB with use of Venti mask, unlikely d/c for today. Case d/w Daughter, will contact social work to set up plans for d/c in near future regarding [facility] and HD TIW. Will f/u with nephrology.    REVIEW OF SYSTEMS:  CONSTITUTIONAL: +weakness, no fevers or chills  EYES/ENT: No visual changes;  No vertigo or throat pain   NECK: No pain or stiffness  RESPIRATORY: No cough, wheezing, hemoptysis; +shortness of breath without supp O2  CARDIOVASCULAR: No chest pain or palpitations  GASTROINTESTINAL: No abdominal or epigastric pain. No nausea, vomiting, or hematemesis; +diarrhea, no constipation. No melena or hematochezia.  GENITOURINARY: No dysuria, frequency or hematuria  NEUROLOGICAL: No numbness or weakness  SKIN: No itching, burning, rashes, or lesions   All other review of systems is negative unless indicated above    Vital Signs Last 24 Hrs  T(C): 37.7 (18 Jul 2018 12:00), Max: 37.7 (18 Jul 2018 12:00)  T(F): 99.8 (18 Jul 2018 12:00), Max: 99.8 (18 Jul 2018 12:00)  HR: 76 (18 Jul 2018 14:00) (60 - 84)  BP: 127/49 (18 Jul 2018 14:00) (100/39 - 139/33)  BP(mean): 66 (18 Jul 2018 14:00) (48 - 77)  RR: 23 (18 Jul 2018 14:00) (7 - 24)  SpO2: 95% (18 Jul 2018 14:00) (90% - 97%)    I&O's Summary    17 Jul 2018 07:01  -  18 Jul 2018 07:00  --------------------------------------------------------  IN: 340 mL / OUT: 0 mL / NET: 340 mL    18 Jul 2018 07:01  -  18 Jul 2018 15:05  --------------------------------------------------------  IN: 500 mL / OUT: 0 mL / NET: 500 mL    PHYSICAL EXAM:    Constitutional: NAD, somnolent, frail appearing+  HEENT: PERR, EOMI, Normal Hearing, MMM  Neck: Soft and supple, No LAD, No JVD  Respiratory: Breath sounds are clear bilaterally, No wheezing, rales or rhonchi  Cardiovascular: S1 and S2, regular rate and rhythm, no Murmurs, gallops or rubs  Gastrointestinal: Bowel Sounds present, soft, nontender, Distended+, no guarding, no rebound  Extremities: LLE lesion with dressings c/d/i, RLE s/p AKA +  Vascular: 2+ peripheral pulses  Neurological: A/O x 3, no focal deficits  Musculoskeletal: unable to assess  Skin: Incontinence associated Dermatitis with Rectal tube+ output green/loose stool    MEDICATIONS  (STANDING):  allopurinol 100 milliGRAM(s) Oral daily  aspirin  chewable 81 milliGRAM(s) Oral daily  buDESOnide   0.5 milliGRAM(s) Respule 0.5 milliGRAM(s) Inhalation two times a day  cholestyramine Powder (Sugar-Free) 4 Gram(s) Oral two times a day  diltiazem    Tablet 30 milliGRAM(s) Oral every 6 hours  doxazosin 8 milliGRAM(s) Oral at bedtime  epoetin nelly Injectable 3000 Unit(s) IV Push <User Schedule>  epoetin nelly Injectable 4000 Unit(s) IV Push <User Schedule>  finasteride 5 milliGRAM(s) Oral daily  heparin  Injectable 5000 Unit(s) SubCutaneous every 12 hours  isosorbide   mononitrate ER Tablet (IMDUR) 120 milliGRAM(s) Oral daily  metoprolol tartrate 25 milliGRAM(s) Oral two times a day  simvastatin 10 milliGRAM(s) Oral at bedtime  vancomycin    Solution 500 milliGRAM(s) Oral every 6 hours    MEDICATIONS  (PRN):  acetaminophen   Tablet 650 milliGRAM(s) Oral every 8 hours PRN For Temp greater than 38 C (100.4 F)  acetaminophen   Tablet. 650 milliGRAM(s) Oral every 8 hours PRN Mild Pain (1 - 3)  albumin human 25% IVPB 50 milliLiter(s) IV Intermittent <User Schedule> PRN sbp<=120mmhg  ALBUTerol   0.5% 2.5 milliGRAM(s) Nebulizer every 4 hours PRN Shortness of Breath and/or Wheezing  diphenhydrAMINE   Capsule 25 milliGRAM(s) Oral at bedtime PRN sleep  HYDROmorphone  Injectable 0.5 milliGRAM(s) IV Push every 6 hours PRN Severe Pain (7 - 10)  ondansetron Injectable 4 milliGRAM(s) IV Push every 6 hours PRN Nausea and/or Vomiting      LABS: All Labs Reviewed:                                   8.2    8.69  )-----------( 225      ( 19 Jul 2018 06:49 )             26.6              07-19    140  |  105  |  20  ----------------------------<  85  3.0<L>   |  30  |  2.92<H>    Ca    7.2<L>      19 Jul 2018 06:49  Mg     1.8     07-19      Blood Culture:   Culture - Urine (07.10.18 @ 16:30)    Specimen Source: .Urine Catheterized    Culture Results:   <10,000 CFU/ml  Normal Urogenital ravinder present    Culture - Blood (07.06.18 @ 13:42)    Specimen Source: .Blood Blood-Peripheral    Culture Results:   No growth at 5 days.      RADIOLOGY/EKG:  < from: Xray Chest 1 View- PORTABLE-Urgent (07.16.18 @ 18:34) >  IMPRESSION:    Findings suggesting persistent mild CHF with mild pulmonary vascular   congestion and trace fluid in the right minor fissure.    Stable right CVC.    < end of copied text >    < from: Transthoracic Echocardiogram (07.10.18 @ 10:21) >   Impression     Summary     Fibrocalcific changes noted to the mitral valve leaflets with preserved   leaflet excursion.   Moderate (2+) mitral regurgitation is present.   The left atrium is moderately dilated.   Mild concentric left ventricular hypertrophy is present.   Estimated left ventricular ejection fraction is 55-60 %.    < end of copied text >    DVT PPX: NO AC due to severe GI bleed in past

## 2018-07-19 NOTE — PROGRESS NOTE ADULT - PROBLEM SELECTOR PLAN 2
-RESOLVED    -continue to maintain fluid balance with HD TIW  -control HFpEF with medical management - Toprol 25mg BID

## 2018-07-19 NOTE — PROGRESS NOTE ADULT - ASSESSMENT
83y M with Sepsis 2/2 severe and recurrent Pseudomembranous Colitis, developed Renal Insufficiency HD dependent, and Acute Hypoxic Respiratory Failure 2/2 Fluid Overload from RF and HFpEF    D/C Planning [facility] with HD, will f/u with Social Work

## 2018-07-19 NOTE — PROGRESS NOTE ADULT - SUBJECTIVE AND OBJECTIVE BOX
NEPHROLOGY INTERVAL HPI/OVERNIGHT EVENTS:  7/19 SY  Reports ate very well today.  Diarrhea improved with thicker consistency and less volume.    7/18 SY  S/p Permcath placement.  No complications.  Reports some improvement in diarrhea.  Denies SOB.  Denies abdominal pain.    7/17  feels well  no complaints   s/p permacath placement  tolerated well  next hd wednesday    7/15  Patient in ICU  Patient's daughter at the bedside  Case discussed with them as well  Overall he feels well  Is able to tolerate some oral intake  Dialysis due tomorrow    7/14  The patient seen on dialysis  Temporary catheter not working well  High pressures  Discussed with patient's daughter that mostly likely by Monday the patient may require placement of a permanent catheter.      MEDICATIONS  (STANDING):  allopurinol 100 milliGRAM(s) Oral daily  aspirin  chewable 81 milliGRAM(s) Oral daily  buDESOnide   0.5 milliGRAM(s) Respule 0.5 milliGRAM(s) Inhalation two times a day  cholestyramine Powder (Sugar-Free) 4 Gram(s) Oral two times a day  diltiazem    Tablet 30 milliGRAM(s) Oral every 6 hours  doxazosin 8 milliGRAM(s) Oral at bedtime  epoetin nelly Injectable 3000 Unit(s) IV Push <User Schedule>  epoetin nelly Injectable 4000 Unit(s) IV Push <User Schedule>  finasteride 5 milliGRAM(s) Oral daily  heparin  Injectable 5000 Unit(s) SubCutaneous every 12 hours  isosorbide   mononitrate ER Tablet (IMDUR) 120 milliGRAM(s) Oral daily  metoprolol tartrate 25 milliGRAM(s) Oral two times a day  simvastatin 10 milliGRAM(s) Oral at bedtime  vancomycin    Solution 500 milliGRAM(s) Oral every 6 hours    MEDICATIONS  (PRN):  acetaminophen   Tablet 650 milliGRAM(s) Oral every 8 hours PRN For Temp greater than 38 C (100.4 F)  acetaminophen   Tablet. 650 milliGRAM(s) Oral every 8 hours PRN Mild Pain (1 - 3)  albumin human 25% IVPB 50 milliLiter(s) IV Intermittent <User Schedule> PRN sbp<=120mmhg  ALBUTerol   0.5% 2.5 milliGRAM(s) Nebulizer every 4 hours PRN Shortness of Breath and/or Wheezing  diphenhydrAMINE   Capsule 25 milliGRAM(s) Oral at bedtime PRN sleep  HYDROmorphone  Injectable 0.5 milliGRAM(s) IV Push every 6 hours PRN Severe Pain (7 - 10)  ondansetron Injectable 4 milliGRAM(s) IV Push every 6 hours PRN Nausea and/or Vomiting          Vital Signs Last 24 Hrs  T(C): 37.3 (19 Jul 2018 06:00), Max: 37.7 (18 Jul 2018 12:00)  T(F): 99.1 (19 Jul 2018 06:00), Max: 99.8 (18 Jul 2018 12:00)  HR: 119 (19 Jul 2018 10:00) (66 - 123)  BP: 103/39 (19 Jul 2018 08:00) (93/35 - 132/42)  BP(mean): 57 (19 Jul 2018 08:00) (48 - 87)  RR: 18 (19 Jul 2018 08:00) (8 - 24)  SpO2: 89% (19 Jul 2018 10:00) (89% - 100%)  Daily     Daily     07-18 @ 07:01 - 07-19 @ 07:00  --------------------------------------------------------  IN: 1100 mL / OUT: 350 mL / NET: 750 mL    07-19 @ 07:01 - 07-19 @ 11:23  --------------------------------------------------------  IN: 240 mL / OUT: 0 mL / NET: 240 mL        PHYSICAL EXAM:  Alert and appropriate  GENERAL: No distress  CHEST/LUNG: Decreased bs at bases,  HEART: S1S2 RRR  ABDOMEN: distended  EXTREMITIES: decreased edema  SKIN:     LABS:                        8.2    8.69  )-----------( 225      ( 19 Jul 2018 06:49 )             26.6     07-19    140  |  105  |  20  ----------------------------<  85  3.0<L>   |  30  |  2.92<H>    Ca    7.2<L>      19 Jul 2018 06:49  Mg     1.8     07-19          Magnesium, Serum: 1.8 mg/dL (07-19 @ 06:49)          RADIOLOGY & ADDITIONAL TESTS:

## 2018-07-19 NOTE — PROGRESS NOTE ADULT - ASSESSMENT
82 y/o wm with hx of ckd stage 3 ( scr 1.5-1.7) with ARYA due to volume depletion from CDiff associated colitis and diarrhea in presence of diuretic use PTA.  Now with non anion gap metabolic acidosis and worsening renal parameters.  CXR with mild CHF with hx of diastolic CHF.    PLAN  - obtain abd and lactate  - will change ivf and add bicarbonate and keep at current rate  - hold lasix done.   - fu uop and scr closely and will monitor respiratory status  - bp and hr stable now, cardizem adjusted and lopressor added    7/9 MK  - ARYA: worsening renal parameters with metabolic acidosis, non ag.  Previous lactate WNL and since placed on bicarbonate drip  fu repeat abg today.  Increase IVF rate  possibility of HD discussed with enio, hcp daughter, pt has been agreeable in past.   DC hydralazine  dc morphine and change to dilaudid  - Cdiff with rising scr and worsening abd distension: CRS consult ongoing  possible repeat ct scan  DW Dr ballesteros    7/10  Anuric  anasarca  Non anion gap acidosis on bicarb drip  ARYA, anuric  CKD 3 history  d/w Family, and pt agreeable  called ICU for line placement and to dialyze the pt in ICU for monitoring    7/10  HD initiated via R temp HD catheter  tolerating well  no complaints  good abf    7/11 SY  --ARYA with Anasarca.  Urine output slightly improved.  However, remains quite fluid overloaded.  Will plan to continue HD until fluid status is much improved.  HD order written.  3 hour tx today.  Will aim to remove 2.5 kg as tolerated.  --C DIff colitis ; continue to monitor closely.    7/12 SY  --ARYA with Anasarca.   Remains Oligo-anuric.    --HD with goal of 2.5 kg UF again today.  --C Diff colitis.   Clinically improved   Continue to monitor.    7/13  The patient was dialyzed 3 days in a row this week  Discussed with the patient's daughter and hospitalist, intensivist  Will skip dialysis today, no urgent need for dialysis  We'll dialyze the patient tomorrow on Saturday and then skip Sunday to dialyze the patient again on Monday.    7/14  We'll dialyze against 4 K bath  Case discussed with the patient's daughter  May need a permanent catheter by Monday      7/15  Dialyze with a 4K bath  Potassium is 3.3 most likely from the diarrhea  Schedule for permacath placement after dialysis on Monday or Tuesday  Patient is comfortable no complications noted at this time    7/16 MK  - ARYA/ CKD stage 3 remains oliguirc and HD dependent.  LImited UF at HD toleated with the   hypokalemia corrected with HD.  Permcath planned today  - Cdiff: on PO vanc.  improved leukocytosis and abd distension     7/17  s/p perm cath  HD tomorrow  4 K bath  replace K   overall stable    7/18 SY  --ARYA/CKD for now remaining dialysis dependent.  Continue TIW HD.  --C Diff colitis ; improving clinically.    7/19 SY  --ARYA/CKD : Continue TIW HD.   Will continue as outpatient for now as no signs of recovery at this point.  --C Diff colitis  : clinically improving.  Continue po abtx.  --D/c Planning  D/w pt's daughter re : rehab with HD.  --Replete K.  Continue regular diet without restrictions for now.

## 2018-07-19 NOTE — PROGRESS NOTE ADULT - ASSESSMENT
Pseudomembranous colitis status post recent antibiotics    By mouth vancomycin high-dose and Flagyl   case discussed with  family  improving  appetite may improve after DC flagyl, encourage PO intake        Patient had a recent course of C. difficile that was treated with vancomycin with a recurrence and he's had C. difficile in the past.  Due to severe C. difficile, would strongly consider a prolonged taper of vancomycin.  For now, would complete two-week course of vancomycin and then would taper her vancomycin over a long time.  Discussed with family.  Taper vancomycin  Vancomycin 125 mg 4 times a day, one more week  Then 3 times a day for 2 weeks, then twice a day for one week, then daily for one week, then every other day for 1 week, then every 3rd day for 1 week.  Follow-up with me after that    cont diarrhea and will cont cholestyramine and stop iron    A fibrillation with rapid ventricular rhythm, status post Cardizem drip.    Would hold anticoagulation secondary to history bleeding    COPD obesity, obstructive sleep apnea, coronary artery disease, overall comorbid risk factors     IVF per renal  HD as needed    Dw family

## 2018-07-19 NOTE — PROGRESS NOTE ADULT - SUBJECTIVE AND OBJECTIVE BOX
Patient is a 83y old  Male who presents with a chief complaint of complain of diarrhea, fever (06 Jul 2018 23:55)      HPI:  Pt is a 82 y/o M w/pmhx of Afib, CHF, CAD s/p stents, COPD, PNA, upper GI bleed (duodenal)  BIB EMS from [facility] assisted living for fever, abd pain, and diarrhea that began 3 weeks ago. Was in the hospital for PNA tx and was later dx with C-diff and started on a course of PO vancomycin for 10 days for the C-diff. States that he has had diarrhea since before the course of abx. Pain has been increased for the past few weeks and is characterized as a cramping feeling. Daughters also note that there is increased distension. Also notes chest pain characterized as a cramping in the central chest. 83% O2 sat noted this morning at the assisted living facility. Takes O2 routinely at night but not during the day. (06 Jul 2018 23:55)    Patient more comfortable. Negative nausea or vomiting. Decreased appetite but tolerating by mouth.  Negative chest pain   Did have shortness of breath and has been on a Ventimask.  Negative abdominal pain    Feels little bit stronger. Urinated some through the night.  History is per patient and daughter.        PAST MEDICAL & SURGICAL HISTORY:  Diastolic CHF  Peripheral vascular disease  Afib  Anemia  CKD (chronic kidney disease)  COPD (chronic obstructive pulmonary disease)  SHAYY (obstructive sleep apnea)  Sepsis, due to unspecified organism: 2/2 poorly healing wounds b/l  Dyspepsia: On moderate exertion.  Sleep apnea, obstructive: Requires home 02 therapy, and treatment with BIPAP  Atelectasis  Pleural effusion, bilateral  Respiratory failure  Peripheral edema  CRI (chronic renal insufficiency)  Gout  Benign prostatic hypertrophy  Spinal stenosis  Hypercholesterolemia  GERD (gastroesophageal reflux disease)  CAD (coronary artery disease)  Hypertension  S/P angioplasty with stent  Cataract of left eye  Prostate: Surgery green light procedure.  S/P rotator cuff surgery: Right  S/P angioplasty      MEDICATIONS  (STANDING):  allopurinol 100 milliGRAM(s) Oral daily  aspirin  chewable 81 milliGRAM(s) Oral daily  buDESOnide   0.5 milliGRAM(s) Respule 0.5 milliGRAM(s) Inhalation two times a day  cholestyramine Powder (Sugar-Free) 4 Gram(s) Oral two times a day  diltiazem    Tablet 30 milliGRAM(s) Oral every 6 hours  doxazosin 8 milliGRAM(s) Oral at bedtime  finasteride 5 milliGRAM(s) Oral daily  heparin  Injectable 5000 Unit(s) SubCutaneous every 12 hours  isosorbide   mononitrate ER Tablet (IMDUR) 120 milliGRAM(s) Oral daily  metoprolol tartrate 25 milliGRAM(s) Oral two times a day  simvastatin 10 milliGRAM(s) Oral at bedtime  vancomycin    Solution 500 milliGRAM(s) Oral every 6 hours    MEDICATIONS  (PRN):  acetaminophen   Tablet 650 milliGRAM(s) Oral every 8 hours PRN For Temp greater than 38 C (100.4 F)  acetaminophen   Tablet. 650 milliGRAM(s) Oral every 8 hours PRN Mild Pain (1 - 3)  albumin human 25% IVPB 50 milliLiter(s) IV Intermittent <User Schedule> PRN for SBP</=120  ALBUTerol   0.5% 2.5 milliGRAM(s) Nebulizer every 4 hours PRN Shortness of Breath and/or Wheezing  diphenhydrAMINE   Capsule 25 milliGRAM(s) Oral at bedtime PRN sleep  HYDROmorphone  Injectable 0.5 milliGRAM(s) IV Push every 6 hours PRN Severe Pain (7 - 10)  ondansetron Injectable 4 milliGRAM(s) IV Push every 6 hours PRN Nausea and/or Vomiting      Allergies    Zosyn (Rash)    Intolerances        SOCIAL HISTORY:NC    FAMILY HISTORY:  No pertinent family history in first degree relatives      REVIEW OF SYSTEMS:    CONSTITUTIONAL: No weakness, fevers or chills  EYES/ENT: No visual changes;  No vertigo or throat pain   NECK: No pain or stiffness  RESPIRATORY: No cough, wheezing, hemoptysis; No shortness of breath  CARDIOVASCULAR: No chest pain or palpitations  GENITOURINARY: No dysuria, frequency or hematuria  NEUROLOGICAL: No numbness or weakness  SKIN: No itching, burning, rashes, or lesions   All other review of systems is negative unless indicated above.    Vital Signs Last 24 Hrs  T(C): 36.2 (18 Jul 2018 08:00), Max: 37.3 (17 Jul 2018 13:00)  T(F): 97.1 (18 Jul 2018 08:00), Max: 99.2 (18 Jul 2018 06:00)  HR: 78 (18 Jul 2018 08:00) (60 - 84)  BP: 116/45 (18 Jul 2018 08:00) (94/34 - 137/47)  BP(mean): 62 (18 Jul 2018 08:00) (45 - 79)  RR: 21 (18 Jul 2018 08:00) (7 - 24)  SpO2: 91% (18 Jul 2018 08:00) (90% - 97%)    PHYSICAL EXAM:    Constitutional: NAD, well-developed  HEENT: EOMI, throat clear  Neck: No LAD, supple  Respiratory: CTA and P  Cardiovascular: S1 and S2, RRR, no M  Gastrointestinal: BS+, soft, NT/mild distension, neg HSM,  Extremities: No peripheral edema, neg clubing, cyanosis    Neurological: A/O x 3, no focal deficits  Psychiatric: Normal mood, normal affect  Skin: No rashes    LABS:  CBC Full  -  ( 18 Jul 2018 05:11 )  WBC Count : 9.89 K/uL  Hemoglobin : 8.3 g/dL  Hematocrit : 27.0 %  Platelet Count - Automated : 211 K/uL  Mean Cell Volume : 91.2 fl  Mean Cell Hemoglobin : 28.0 pg  Mean Cell Hemoglobin Concentration : 30.7 gm/dL  Auto Neutrophil # : 7.98 K/uL  Auto Lymphocyte # : 0.59 K/uL  Auto Monocyte # : 0.77 K/uL  Auto Eosinophil # : 0.43 K/uL  Auto Basophil # : 0.04 K/uL  Auto Neutrophil % : 80.7 %  Auto Lymphocyte % : 6.0 %  Auto Monocyte % : 7.8 %  Auto Eosinophil % : 4.3 %  Auto Basophil % : 0.4 %    07-18    144  |  109<H>  |  27<H>  ----------------------------<  103<H>  3.4<L>   |  25  |  3.65<H>    Ca    7.0<L>      18 Jul 2018 05:11      PT/INR - ( 16 Jul 2018 12:10 )   PT: 12.7 sec;   INR: 1.17 ratio         PTT - ( 16 Jul 2018 12:10 )  PTT:28.2 sec    07-10 @ 16:30  Hep A Igm Nonreact  Hep A total ab, IgA and M --  Hep B core Ab total --  Hep B core IgM Nonreact  Hep B DNA PCR --  Hep BSAg --  Hep BSAb --  Hep BSAb --  HCV Ab --  HCV RNA Log --  HCV RNA interp --                RADIOLOGY & ADDITIONAL STUDIES:

## 2018-07-20 ENCOUNTER — TRANSCRIPTION ENCOUNTER (OUTPATIENT)
Age: 83
End: 2018-07-20

## 2018-07-20 LAB
ALBUMIN SERPL ELPH-MCNC: 2.1 G/DL — LOW (ref 3.3–5)
ANION GAP SERPL CALC-SCNC: 9 MMOL/L — SIGNIFICANT CHANGE UP (ref 5–17)
BUN SERPL-MCNC: 28 MG/DL — HIGH (ref 7–23)
CALCIUM SERPL-MCNC: 7.2 MG/DL — LOW (ref 8.5–10.1)
CHLORIDE SERPL-SCNC: 107 MMOL/L — SIGNIFICANT CHANGE UP (ref 96–108)
CO2 SERPL-SCNC: 26 MMOL/L — SIGNIFICANT CHANGE UP (ref 22–31)
CREAT SERPL-MCNC: 3.81 MG/DL — HIGH (ref 0.5–1.3)
GLUCOSE SERPL-MCNC: 92 MG/DL — SIGNIFICANT CHANGE UP (ref 70–99)
HCT VFR BLD CALC: 26 % — LOW (ref 39–50)
HGB BLD-MCNC: 8.2 G/DL — LOW (ref 13–17)
MAGNESIUM SERPL-MCNC: 1.7 MG/DL — SIGNIFICANT CHANGE UP (ref 1.6–2.6)
MCHC RBC-ENTMCNC: 29 PG — SIGNIFICANT CHANGE UP (ref 27–34)
MCHC RBC-ENTMCNC: 31.5 GM/DL — LOW (ref 32–36)
MCV RBC AUTO: 91.9 FL — SIGNIFICANT CHANGE UP (ref 80–100)
NRBC # BLD: 0 /100 WBCS — SIGNIFICANT CHANGE UP (ref 0–0)
PHOSPHATE SERPL-MCNC: 4.1 MG/DL — SIGNIFICANT CHANGE UP (ref 2.5–4.5)
PLATELET # BLD AUTO: 239 K/UL — SIGNIFICANT CHANGE UP (ref 150–400)
POTASSIUM SERPL-MCNC: 3.5 MMOL/L — SIGNIFICANT CHANGE UP (ref 3.5–5.3)
POTASSIUM SERPL-SCNC: 3.5 MMOL/L — SIGNIFICANT CHANGE UP (ref 3.5–5.3)
RBC # BLD: 2.83 M/UL — LOW (ref 4.2–5.8)
RBC # FLD: 20.4 % — HIGH (ref 10.3–14.5)
SODIUM SERPL-SCNC: 142 MMOL/L — SIGNIFICANT CHANGE UP (ref 135–145)
WBC # BLD: 10.22 K/UL — SIGNIFICANT CHANGE UP (ref 3.8–10.5)
WBC # FLD AUTO: 10.22 K/UL — SIGNIFICANT CHANGE UP (ref 3.8–10.5)

## 2018-07-20 PROCEDURE — 71045 X-RAY EXAM CHEST 1 VIEW: CPT | Mod: 26

## 2018-07-20 RX ORDER — CHOLESTYRAMINE 4 G/9G
1 POWDER, FOR SUSPENSION ORAL
Qty: 0 | Refills: 0 | COMMUNITY
Start: 2018-07-20

## 2018-07-20 RX ORDER — ALBUTEROL 90 UG/1
0.5 AEROSOL, METERED ORAL
Qty: 0 | Refills: 0 | COMMUNITY
Start: 2018-07-20

## 2018-07-20 RX ORDER — DILTIAZEM HCL 120 MG
1 CAPSULE, EXT RELEASE 24 HR ORAL
Qty: 0 | Refills: 0 | COMMUNITY
Start: 2018-07-20

## 2018-07-20 RX ORDER — FUROSEMIDE 40 MG
1 TABLET ORAL
Qty: 0 | Refills: 0 | COMMUNITY

## 2018-07-20 RX ORDER — VANCOMYCIN HCL 1 G
1 VIAL (EA) INTRAVENOUS
Qty: 0 | Refills: 0 | COMMUNITY
Start: 2018-07-20

## 2018-07-20 RX ADMIN — ERYTHROPOIETIN 4000 UNIT(S): 10000 INJECTION, SOLUTION INTRAVENOUS; SUBCUTANEOUS at 13:16

## 2018-07-20 RX ADMIN — Medication 125 MILLIGRAM(S): at 11:39

## 2018-07-20 RX ADMIN — Medication 81 MILLIGRAM(S): at 13:21

## 2018-07-20 RX ADMIN — Medication 50 MILLILITER(S): at 11:03

## 2018-07-20 RX ADMIN — SIMVASTATIN 10 MILLIGRAM(S): 20 TABLET, FILM COATED ORAL at 22:03

## 2018-07-20 RX ADMIN — Medication 50 MILLILITER(S): at 13:14

## 2018-07-20 RX ADMIN — CHOLESTYRAMINE 4 GRAM(S): 4 POWDER, FOR SUSPENSION ORAL at 00:08

## 2018-07-20 RX ADMIN — Medication 500 MILLIGRAM(S): at 05:09

## 2018-07-20 RX ADMIN — Medication 25 MILLIGRAM(S): at 09:30

## 2018-07-20 RX ADMIN — Medication 0.5 MILLIGRAM(S): at 07:40

## 2018-07-20 RX ADMIN — CHOLESTYRAMINE 4 GRAM(S): 4 POWDER, FOR SUSPENSION ORAL at 09:31

## 2018-07-20 RX ADMIN — Medication 500 MILLIGRAM(S): at 13:20

## 2018-07-20 RX ADMIN — HYDROMORPHONE HYDROCHLORIDE 0.5 MILLIGRAM(S): 2 INJECTION INTRAMUSCULAR; INTRAVENOUS; SUBCUTANEOUS at 09:30

## 2018-07-20 RX ADMIN — FINASTERIDE 5 MILLIGRAM(S): 5 TABLET, FILM COATED ORAL at 13:21

## 2018-07-20 RX ADMIN — HEPARIN SODIUM 5000 UNIT(S): 5000 INJECTION INTRAVENOUS; SUBCUTANEOUS at 05:09

## 2018-07-20 RX ADMIN — Medication 650 MILLIGRAM(S): at 13:22

## 2018-07-20 RX ADMIN — Medication 50 MILLILITER(S): at 12:04

## 2018-07-20 RX ADMIN — Medication 100 MILLIGRAM(S): at 13:21

## 2018-07-20 RX ADMIN — CHOLESTYRAMINE 4 GRAM(S): 4 POWDER, FOR SUSPENSION ORAL at 22:03

## 2018-07-20 RX ADMIN — Medication 500 MILLIGRAM(S): at 18:29

## 2018-07-20 RX ADMIN — ERYTHROPOIETIN 3000 UNIT(S): 10000 INJECTION, SOLUTION INTRAVENOUS; SUBCUTANEOUS at 13:16

## 2018-07-20 RX ADMIN — Medication 8 MILLIGRAM(S): at 22:03

## 2018-07-20 RX ADMIN — HYDROMORPHONE HYDROCHLORIDE 0.5 MILLIGRAM(S): 2 INJECTION INTRAMUSCULAR; INTRAVENOUS; SUBCUTANEOUS at 18:28

## 2018-07-20 RX ADMIN — HEPARIN SODIUM 5000 UNIT(S): 5000 INJECTION INTRAVENOUS; SUBCUTANEOUS at 18:31

## 2018-07-20 RX ADMIN — Medication 0.5 MILLIGRAM(S): at 20:43

## 2018-07-20 NOTE — PROGRESS NOTE ADULT - ASSESSMENT
Pseudomembranous colitis status post recent antibiotics    By mouth vancomycin   case discussed with  family  improving  appetite may improve after DC flagyl, encourage PO intake        Patient had a recent course of C. difficile that was treated with vancomycin with a recurrence and he's had C. difficile in the past.  Due to severe C. difficile, would strongly consider a prolonged taper of vancomycin.  For now, would complete two-week course of vancomycin and then would taper her vancomycin over a long time.  Discussed with family.  Taper vancomycin  Vancomycin 125 mg 4 times a day, one more week  Then 3 times a day for 2 weeks, then twice a day for one week, then daily for one week, then every other day for 1 week, then every 3rd day for 1 week.  Follow-up with me after that        cont diarrhea and will cont cholestyramine and stop iron    A fibrillation with rapid ventricular rhythm, status post Cardizem drip.    Would hold anticoagulation secondary to history bleeding    COPD obesity, obstructive sleep apnea, coronary artery disease, overall comorbid risk factors     IVF per renal  HD as needed    Dw family

## 2018-07-20 NOTE — PROGRESS NOTE ADULT - SUBJECTIVE AND OBJECTIVE BOX
NEPHROLOGY INTERVAL HPI/OVERNIGHT EVENTS:  had episode of sinus tach this am, resolved with cardiazem  continues with desats with no relationship to dialysis  still with no UOP  diarrhea more formed    MEDICATIONS  (STANDING):  allopurinol 100 milliGRAM(s) Oral daily  aspirin  chewable 81 milliGRAM(s) Oral daily  buDESOnide   0.5 milliGRAM(s) Respule 0.5 milliGRAM(s) Inhalation two times a day  cholestyramine Powder (Sugar-Free) 4 Gram(s) Oral two times a day  diltiazem    Tablet 30 milliGRAM(s) Oral every 6 hours  doxazosin 8 milliGRAM(s) Oral at bedtime  epoetin nelly Injectable 3000 Unit(s) IV Push <User Schedule>  epoetin nelly Injectable 4000 Unit(s) IV Push <User Schedule>  finasteride 5 milliGRAM(s) Oral daily  heparin  Injectable 5000 Unit(s) SubCutaneous every 12 hours  isosorbide   mononitrate ER Tablet (IMDUR) 120 milliGRAM(s) Oral daily  metoprolol tartrate 25 milliGRAM(s) Oral two times a day  simvastatin 10 milliGRAM(s) Oral at bedtime  sodium ferric gluconate complex Injectable 125 milliGRAM(s) IV Push <User Schedule>  vancomycin    Solution 500 milliGRAM(s) Oral every 6 hours    MEDICATIONS  (PRN):  acetaminophen   Tablet 650 milliGRAM(s) Oral every 8 hours PRN For Temp greater than 38 C (100.4 F)  acetaminophen   Tablet. 650 milliGRAM(s) Oral every 8 hours PRN Mild Pain (1 - 3)  albumin human 25% IVPB 50 milliLiter(s) IV Intermittent <User Schedule> PRN sbp<=120mmhg  ALBUTerol   0.5% 2.5 milliGRAM(s) Nebulizer every 4 hours PRN Shortness of Breath and/or Wheezing  diphenhydrAMINE   Capsule 25 milliGRAM(s) Oral at bedtime PRN sleep  HYDROmorphone  Injectable 0.5 milliGRAM(s) IV Push every 6 hours PRN Severe Pain (7 - 10)  ondansetron Injectable 4 milliGRAM(s) IV Push every 6 hours PRN Nausea and/or Vomiting      Allergies    Zosyn (Rash)    Intolerances        I&O's Detail    19 Jul 2018 07:01  -  20 Jul 2018 07:00  --------------------------------------------------------  IN:    Oral Fluid: 340 mL  Total IN: 340 mL    OUT:  Total OUT: 0 mL    Total NET: 340 mL          .    Patient was seen and evaluated on dialysis.   Patient is tolerating the procedure well.   Continue dialysis:   Dialyzer:  revaclear 300 with bfr of 350 on 4k with uf goal of 2-3 kg    Vital Signs Last 24 Hrs  T(C): 36.9 (20 Jul 2018 05:00), Max: 37.2 (20 Jul 2018 00:00)  T(F): 98.4 (20 Jul 2018 05:00), Max: 98.9 (20 Jul 2018 00:00)  HR: 120 (20 Jul 2018 07:43) (69 - 121)  BP: 100/44 (20 Jul 2018 06:00) (93/34 - 133/42)  BP(mean): 58 (20 Jul 2018 06:00) (49 - 69)  RR: 17 (20 Jul 2018 06:00) (10 - 24)  SpO2: 94% (20 Jul 2018 06:00) (87% - 100%)  Daily     Daily     PHYSICAL EXAM:  General: alert. awake Ox3  HEENT: MMM  CV: s1s2 rrr  LUNGS: B/L CTA  EXT: no edema    LABS:                        8.2    10.22 )-----------( 239      ( 20 Jul 2018 05:45 )             26.0     07-20    142  |  107  |  28<H>  ----------------------------<  92  3.5   |  26  |  3.81<H>    Ca    7.2<L>      20 Jul 2018 05:45  Phos  4.1     07-20  Mg     1.7     07-20    TPro  x   /  Alb  2.1<L>  /  TBili  x   /  DBili  x   /  AST  x   /  ALT  x   /  AlkPhos  x   07-20        Phosphorus Level, Serum: 4.1 mg/dL (07-20 @ 05:45)  Magnesium, Serum: 1.7 mg/dL (07-20 @ 05:45)

## 2018-07-20 NOTE — PROGRESS NOTE ADULT - PROBLEM SELECTOR PLAN 2
-continue to maintain fluid balance with HD TIW  -control HFpEF with medical management - Toprol 25mg BID  -Attempt to wean off O2  -Incentive Spirometry

## 2018-07-20 NOTE — PROGRESS NOTE ADULT - SUBJECTIVE AND OBJECTIVE BOX
Cardiology Progress Note  Patient is a 83y old  Male who presents with a chief complaint of complain of diarrhea, fever.     HPI: Pt is a 84 y/o M w/pmhx of Afib, CHF, CAD s/p stents, COPD, PNA, upper GI bleed (duodenal)  BIB EMS from [facility] assisted living for fever, abd pain, and diarrhea that began 3 weeks ago. Was in the hospital for PNA tx and was later dx with C-diff and started on a course of PO vancomycin for 10 days for the C-diff. States that he has had diarrhea since before the course of abx. Pain has been increased for the past few weeks and is characterized as a cramping feeling. Daughters also note that there is increased distension.     7/10- pt seen and examined today am.  Daughter at bedside.     7/11- pt seen and examined by me today.  He denies any symptoms. Much alert today. NG tube in place./  Daughter at bedside.    7/16- Case d/w nursing, pt and daughter. Rectal tube placed. No CP/SOB. Continues to have diarrhea but mildly improved.     7/17- Continues to have rectal/abd pain. No CP/SOB. S/p permacath placement yesterday as well.     7/18- No CP/SOB. Still weak, but feels slightly better today. No fevers. Case d/w daughter as well. SR on tele.     7/20- Sinus tach in 110s now- cardizem was held prior. -120. Feels tired, weak. No CP/SOB.     PAST MEDICAL & SURGICAL HISTORY:  Diastolic CHF  Peripheral vascular disease  Afib  Anemia  CKD (chronic kidney disease)  COPD (chronic obstructive pulmonary disease)  SHAYY (obstructive sleep apnea)  Sepsis, due to unspecified organism: 2/2 poorly healing wounds b/l  Dyspepsia: On moderate exertion.  Sleep apnea, obstructive: Requires home 02 therapy, and treatment with BIPAP  Atelectasis  Pleural effusion, bilateral  Respiratory failure  Peripheral edema  CRI (chronic renal insufficiency)  Gout  Benign prostatic hypertrophy  Spinal stenosis  Hypercholesterolemia  GERD (gastroesophageal reflux disease)  CAD (coronary artery disease)  Hypertension  S/P angioplasty with stent  Cataract of left eye  Prostate: Surgery green light procedure.  S/P rotator cuff surgery: Right  S/P angioplasty      MEDICATIONS  (STANDING):  allopurinol 100 milliGRAM(s) Oral daily  buDESOnide   0.5 milliGRAM(s) Respule 0.5 milliGRAM(s) Inhalation two times a day  dextrose 5% 1000 milliLiter(s) (75 mL/Hr) IV Continuous <Continuous>  diltiazem    Tablet 30 milliGRAM(s) Oral every 6 hours  doxazosin 8 milliGRAM(s) Oral at bedtime  ferrous    sulfate 325 milliGRAM(s) Oral daily  finasteride 5 milliGRAM(s) Oral daily  hydrALAZINE 50 milliGRAM(s) Oral two times a day  isosorbide   mononitrate ER Tablet (IMDUR) 120 milliGRAM(s) Oral daily  metoprolol tartrate 25 milliGRAM(s) Oral two times a day  metroNIDAZOLE  IVPB      metroNIDAZOLE  IVPB 500 milliGRAM(s) IV Intermittent every 8 hours  simvastatin 10 milliGRAM(s) Oral at bedtime  vancomycin    Solution 500 milliGRAM(s) Oral every 6 hours    MEDICATIONS  (PRN):  acetaminophen   Tablet 650 milliGRAM(s) Oral every 8 hours PRN For Temp greater than 38 C (100.4 F)  acetaminophen   Tablet. 650 milliGRAM(s) Oral every 8 hours PRN Mild Pain (1 - 3)  ALBUTerol   0.5% 2.5 milliGRAM(s) Nebulizer every 4 hours PRN Shortness of Breath and/or Wheezing  morphine  - Injectable 2 milliGRAM(s) IV Push every 6 hours PRN Severe Pain (7 - 10)  ondansetron Injectable 4 milliGRAM(s) IV Push every 6 hours PRN Nausea and/or Vomiting    FAMILY HISTORY: No pertinent family history in first degree relatives    SOCIAL HISTORY: as above.    REVIEW OF SYSTEMS:  CONSTITUTIONAL:    No fatigue, malaise, lethargy.  No fever or chills.  HEENT:  Eyes:  No visual changes.     ENT:  No epistaxis.  No sinus pain.    RESPIRATORY:  No cough.  No wheeze.  No hemoptysis.  No shortness of breath.  CARDIOVASCULAR:  No chest pains.  No palpitations. No shortness of breath, No orthopnea or PND.  GASTROINTESTINAL:  no abdominal pain.  No nausea or vomiting.    GENITOURINARY:    No hematuria.    MUSCULOSKELETAL:  No musculoskeletal pain.  No joint swelling.  No arthritis.  NEUROLOGICAL:  No tingling or numbness or weakness.  PSYCHIATRIC:  No confusion    Vital Signs Last 24 Hrs  T(C): 36.4 (09 Jul 2018 04:00), Max: 36.7 (08 Jul 2018 11:40)  T(F): 97.6 (09 Jul 2018 04:00), Max: 98.1 (08 Jul 2018 11:40)  HR: 66 (09 Jul 2018 07:40) (66 - 80)  BP: 115/23 (09 Jul 2018 04:00) (109/28 - 137/33)  BP(mean): --  RR: 16 (09 Jul 2018 04:00) (16 - 18)  SpO2: 93% (09 Jul 2018 04:00) (91% - 95%)    PHYSICAL EXAM-    Constitutional: ill looking elderly male in no acute distress.     Head: Head is normocephalic and atraumatic.      Neck: NG tube in place    Cardiovascular: Regular rate and rhythm without S3, S4. No murmurs or rubs are appreciated.      Respiratory: Breath sounds are normal. No rales. No wheezing.    Abdomen: Soft, nontender, distended with positive bowel sounds.      Extremity: No tenderness. No  pitting edema     Neurologic: The patient is alert and oriented.      INTERPRETATION OF TELEMETRY: sinus rythm, NSVT, bigeminy    ECG:    I&O's Detail      LABS:                        8.1    20.78 )-----------( 239      ( 09 Jul 2018 05:48 )             25.6     07-09    144  |  114<H>  |  65<H>  ----------------------------<  113<H>  4.5   |  17<L>  |  3.90<H>    Ca    7.1<L>      09 Jul 2018 05:48  Mg     1.9     07-09    CARDIAC MARKERS ( 07 Jul 2018 17:51 )  0.024 ng/mL / x     / x     / x     / x        I&O's Summary    BNP  RADIOLOGY & ADDITIONAL STUDIES:    < from: Transthoracic Echocardiogram (07.10.18 @ 10:21) >     Fibrocalcific changes noted to the mitral valve leaflets with preserved   leaflet excursion.   Moderate (2+) mitral regurgitation is present.   The left atrium is moderately dilated.   Mild concentric left ventricular hypertrophy is present.   Estimated left ventricular ejection fraction is 55-60 %.    < end of copied text >

## 2018-07-20 NOTE — CHART NOTE - NSCHARTNOTEFT_GEN_A_CORE
Assessment:   *s/p permcath placement on 7/18. pending d/c planning to [facility] with HD.  *diarrhea improved with more formed stool, titrating of vanco 2/2 severe C.Diff.  *pt with improving appetite per pt and family at bedside.  Pt drinking 100% of prosource TID with more of tray being consumed daily.  Pt likely meeting ~63% of estimated calorie needs and ~100% of protein needs with oral supplement.  D/w pt and family importance of adequate protein intake and reviewed renal diet Rx.  In v/o current labs and suboptimal PO intake, pt family reports nephrologist agreed to liberalization of diet to optimize PO intake.  *pt with wt gain from admission, possibly fluid gain as pt managed on dialysis.    Recommendations:  1) change diet Rx to low sodium with 1L fluid restriction and prosource TID  2) add nephrovite daily to ensure 100% RDI met  3) daily wt checks  4) encourage protein intake with each meal      Diet Presciption: Diet, Renal Restrictions:   For patients receiving Renal Replacement - No Protein Restr, No Conc K, No Conc Phos, Low Sodium (07-16-18 @ 17:30)      Wt Hx:  83.9Kg (7/18)  87Kg (7/10)  Weight (kg): 79.4 (07-06-18 @ 13:17)    Estimated Needs:   [x] no change since previous assessment  Estimated Energy Needs (25-30 calories/kg):  · Weight  (lbs) 151 lb  · Weight (kg) 68.4 kg  · From 1710 To 2052 Kcal/day     Other Calculation:  · Other Calculation  Ht.    67  "        Wt.191.8    #              BMI 31  (Adjusted for AKA)     #    Pt is at  140  %  IBW  #    Estimated Protein Needs (1.2-1.4 gm/kg):  From 82.08 To 95.76 g protein/day    Nutrition Diagnostic #1:  · Nutrition Diagnostic Terminology #1: Nutrient  · Nutrient: Malnutrition; Pt meet criteria for severe protein/calorie malnutrition in context of acute illness secondary to significant weight loss, poor po intake < 75% x > 5 days, and severe muscle wasting.  · Etiology: Rapid AFIB, ileus, ARF/HD  · Signs/Symptoms: significant weight loss, severe muscle wasting, NPO status x 6 days  · Nutrition Intervention: Meals and Snack  · Meals and Snacks: Other (specify); Advance to clear liquid diet if feasible. Initiate TPN/PPN if NPO status > 7 days  · Goal/Expected Outcome: Tolerate advanced po diet/ TPN, No s/s of malnutrition      Nutrition Diagnosis is [x] ongoing  [ ] resolved [ ] not applicable     New Nutrition Diagnosis: [x] not applicable         Pertinent Medications: MEDICATIONS  (STANDING):  allopurinol 100 milliGRAM(s) Oral daily  aspirin  chewable 81 milliGRAM(s) Oral daily  buDESOnide   0.5 milliGRAM(s) Respule 0.5 milliGRAM(s) Inhalation two times a day  cholestyramine Powder (Sugar-Free) 4 Gram(s) Oral two times a day  diltiazem    Tablet 30 milliGRAM(s) Oral every 6 hours  doxazosin 8 milliGRAM(s) Oral at bedtime  epoetin nelly Injectable 3000 Unit(s) IV Push <User Schedule>  epoetin nelly Injectable 4000 Unit(s) IV Push <User Schedule>  finasteride 5 milliGRAM(s) Oral daily  heparin  Injectable 5000 Unit(s) SubCutaneous every 12 hours  isosorbide   mononitrate ER Tablet (IMDUR) 120 milliGRAM(s) Oral daily  metoprolol tartrate 25 milliGRAM(s) Oral two times a day  simvastatin 10 milliGRAM(s) Oral at bedtime  sodium ferric gluconate complex Injectable 125 milliGRAM(s) IV Push <User Schedule>  vancomycin    Solution 500 milliGRAM(s) Oral every 6 hours    MEDICATIONS  (PRN):  acetaminophen   Tablet 650 milliGRAM(s) Oral every 8 hours PRN For Temp greater than 38 C (100.4 F)  acetaminophen   Tablet. 650 milliGRAM(s) Oral every 8 hours PRN Mild Pain (1 - 3)  albumin human 25% IVPB 50 milliLiter(s) IV Intermittent <User Schedule> PRN sbp<=120mmhg  ALBUTerol   0.5% 2.5 milliGRAM(s) Nebulizer every 4 hours PRN Shortness of Breath and/or Wheezing  diphenhydrAMINE   Capsule 25 milliGRAM(s) Oral at bedtime PRN sleep  HYDROmorphone  Injectable 0.5 milliGRAM(s) IV Push every 6 hours PRN Severe Pain (7 - 10)  ondansetron Injectable 4 milliGRAM(s) IV Push every 6 hours PRN Nausea and/or Vomiting    Pertinent Labs: 07-20 Na142 mmol/L Glu 92 mg/dL K+ 3.5 mmol/L Cr  3.81 mg/dL<H> BUN 28 mg/dL<H> 07-20 Phos 4.1 mg/dL 07-20 Alb 2.1 g/dL<L>    Skin: omar score = 13    Monitoring and Evaluation:   [x] PO intake/Nutr support infusion [ x ] Tolerance to Nutr [ x ] weights [ x ] labs[ x ] follow up per protocol  [ ] other:

## 2018-07-20 NOTE — DISCHARGE NOTE ADULT - PLAN OF CARE
Relieve Infection Tapering of Vancomycin as follows:  Vancomycin 125mg PO 4x a day for one week. Then 3 times a day for 2 weeks, then twice a day for one week, then daily for one week, then every other day for 1 week, then every 3rd day for 1 week.  -After Vancomycin taper follow up in outpatient with Dr. Hernández resolve Shortness of Breath Maintain fluid balance with Hemodialysis. Encourage use of Incentive Spirometry, and physical rehabilitation to strengthen body and lungs. Sleep Study in outpatient recommended due to history of Obstructive Sleep Apnea, follow up with Dr. Maxwell in outpatient setting after discharge. Maintain Kidney Health Patient will stay on Hemodialysis during Rehab 3x a week, follow up with Nephrologist Dr. Sanabria in outpatient setting. Rate Control Patient is currently rate controlled and in sinus rhythm. Due to history of major GI Bleeds, no indication for anticoagulation at this time. Patient to remain on Diltiazem outpatient for rate control. Tapering of Vancomycin as follows:  Vancomycin 500mg PO 4x a day for one week. Then 3 times a day for 2 weeks, then twice a day for one week, then daily for one week, then every other day for 1 week, then every 3rd day for 1 week.  -After Vancomycin taper follow up in outpatient with Dr. Hernández Maintain fluid balance with Hemodialysis. Encourage use of Incentive Spirometry, and physical rehabilitation to strengthen body and lungs. Initially with SOB on admission due to moderate ascites and was recommended for sleep study but symptoms resolved once fluid status improved. - Tapering of Vancomycin as follows: Vancomycin 500mg PO 4 x a day for one week. Then 3 times a day for 2 weeks, then twice a day for one week, then daily for one week, then every other day for 1 week, then every 3rd day for 1 week.  - After Vancomycin taper follow up in outpatient with Dr. Hernández

## 2018-07-20 NOTE — PROGRESS NOTE ADULT - SUBJECTIVE AND OBJECTIVE BOX
Pt has been seen and examined with FP resident, resident supervised agree with a/p       Patient is a 83y old  Male who presents with a chief complaint of Fever/Diarrhea (20 Jul 2018 13:30)        PHYSICAL EXAM:  Vital Signs Last 24 Hrs  T(C): 36.6 (20 Jul 2018 14:26), Max: 37.2 (20 Jul 2018 00:00)  T(F): 97.8 (20 Jul 2018 14:26), Max: 98.9 (20 Jul 2018 00:00)  HR: 75 (20 Jul 2018 14:26) (71 - 121)  BP: 126/38 (20 Jul 2018 14:26) (90/53 - 126/38)  BP(mean): 53 (20 Jul 2018 13:35) (49 - 69)  RR: 19 (20 Jul 2018 14:26) (10 - 24)  SpO2: 96% (20 Jul 2018 14:26) (87% - 100%)  general- comfortable, chronically ill appearing    -rs-aeeb,cta  -cvs-s1s2 normal   -p/a-soft,bs+  -extremity- no asymmetrical swelling noted   -cns- non focal         A/P    #if cleared by pulmologist then possible d/c today, time spent 65 minutes     #d/c ISMN and metorprolol in view of low blood pressure, start cardizem for rate control     #dvt pr

## 2018-07-20 NOTE — DISCHARGE NOTE ADULT - PATIENT PORTAL LINK FT
You can access the [facility], offered by [facility], by registering with the following website: [email or web address]

## 2018-07-20 NOTE — DISCHARGE NOTE ADULT - HOSPITAL COURSE
Patient is 83y M PMHx of Afib, CHF, CAD s/p Stents, COPD, PNA, Upper GI Bleed, Brought in from [facility] with fever and diarrhea, started after recent hospitalization for pneumonia, patient was worked up and found to have pseudomembranous Colitis. Patient admitted to the hospital on 7/6/18, treated by primary hospitalist team and GI doctor for Severe C. Diff infection, with Oral Vancomycin and Flagyl. Patient developed Renal insufficiency and became Hemodialysis dependent, and also noted to have episodes of SOB with desaturation, likely pulmonary edema. Patient was treated on the floor by nephrologist, and seen by pulmonologist for supportive care, and cardiologist for rate control given PAF episodes attributing to pulmonary edema. Patient diarrhea and infection treated over the course of the hospital stay, with long taper recommended by GI for outpatient. Patient became oxygen dependent, due to weakness/lack of ambulation and fluid overload, weaned off oxygen, and sent to subacute rehabilitation with HD for recovery. Advised to follow up with all specialists noted in discharge instructions for continuity of care in outpatient setting. Patient is 83y M PMHx of Afib, CHF, CAD s/p Stents, COPD, PNA, Upper GI Bleed, Brought in from [facility] with fever and diarrhea, started after recent hospitalization for pneumonia, patient was worked up and found to have pseudomembranous Colitis. Patient admitted to the hospital on 7/6/18, treated by primary hospitalist team and GI doctor for Severe C. Diff infection, with Oral Vancomycin and Flagyl. Patient developed Renal insufficiency and became Hemodialysis dependent, and also noted to have episodes of SOB with desaturation, likely pulmonary edema. Patient was treated on the floor by nephrologist, and seen by pulmonologist for supportive care, and cardiologist for rate control given PAF episodes attributing to pulmonary edema. Patient diarrhea and infection treated over the course of the hospital stay, with long taper recommended by GI for outpatient. Patient became oxygen dependent, due to weakness/lack of ambulation and fluid overload, weaned off oxygen, and sent to subacute rehabilitation with HD for recovery. Advised to follow up with all specialists noted in discharge instructions for continuity of care in outpatient setting.      8/7/18:   Pt was seen and examined at bedside. Pt still reports right lower quadrant abdominal cramping better today. Denies diarrhea overnight. No other acute complaints. Abdomen still moderately distended but no respiratory compromise. Tolerating po diet. Denies cp, sob, headaches, fever, chills. Patient is 83y M PMHx of Afib, CHF, CAD s/p Stents, COPD, PNA, Upper GI Bleed, Brought in from [facility] with fever and diarrhea, started after recent hospitalization for pneumonia, patient was worked up and found to have pseudomembranous Colitis. Patient admitted to the hospital on 7/6/18, treated by primary hospitalist team and GI doctor for Severe C. Diff infection, with Oral Vancomycin and Flagyl. Patient developed Renal insufficiency and became Hemodialysis dependent, and also noted to have episodes of SOB with desaturation, likely pulmonary edema. Patient was treated on the floor by nephrologist, and seen by pulmonologist for supportive care, and cardiologist for rate control given PAF episodes attributing to pulmonary edema. Patient diarrhea and infection treated over the course of the hospital stay, with long taper recommended by GI for outpatient. Patient became oxygen dependent, due to weakness/lack of ambulation and fluid overload, weaned off oxygen, and sent to subacute rehabilitation with HD for recovery. Advised to follow up with all specialists noted in discharge instructions for continuity of care in outpatient setting.    8/7/18:   Pt was seen and examined at bedside. Pt still reports right lower quadrant abdominal cramping better today. Denies diarrhea overnight. No other acute complaints. Abdomen still moderately distended but no respiratory compromise. Tolerating po diet. Denies cp, sob, headaches, fever, chills. Patient is 83y M PMHx of Afib, CHF, CAD s/p Stents, COPD, PNA, Upper GI Bleed, Brought in from [facility] with fever and diarrhea, started after recent hospitalization for pneumonia, patient was worked up and found to have pseudomembranous Colitis. Patient admitted to the hospital on 7/6/18, treated by primary hospitalist team and GI doctor for Severe C. Diff infection, with Oral Vancomycin and Flagyl. Patient developed Renal insufficiency and became Hemodialysis dependent, and also noted to have episodes of SOB with desaturation, likely pulmonary edema in the setting of fluid overload which has now resolved. Patient's diarrhea and infection treated over the course of the hospital stay, with long taper of vancomycin. Patient became oxygen dependent, due to weakness/lack of ambulation and fluid overload, weaned off oxygen, and now stable for discharge to [facility]. Advised to follow up with all specialists noted in discharge instructions for continuity of care in outpatient setting.    8/7/18:   Pt was seen and examined at bedside. Pt still reports right lower quadrant abdominal cramping better today. Denies diarrhea overnight. No other acute complaints. Abdomen still moderately distended but no respiratory compromise. Tolerating po diet. Denies cp, sob, headaches, fever, chills.     8/8/18: Patient with minimal diarrhea today. Mild right lower abdominal cramping. Appears well, has a good appetite. Patient is 83y M PMHx of Afib, CHF, CAD s/p Stents, COPD, PNA, Upper GI Bleed, Brought in from [facility] with fever and diarrhea, started after recent hospitalization for pneumonia, patient was worked up and found to have pseudomembranous Colitis. Patient admitted to the hospital on 7/6/18, treated by primary hospitalist team and GI doctor for Severe C. Diff infection, with Oral Vancomycin and Flagyl. Patient developed Renal insufficiency and became Hemodialysis dependent, and also noted to have episodes of SOB with desaturation, likely pulmonary edema in the setting of fluid overload which has now resolved. Patient's diarrhea and infection treated over the course of the hospital stay, with long taper of vancomycin. Patient became oxygen dependent, due to weakness/lack of ambulation and fluid overload, weaned off oxygen, and now stable for discharge to [facility]. Advised to follow up with all specialists noted in discharge instructions for continuity of care in outpatient setting.    8/9/18:   Abdominal pain better. No fevers, chills or shakes. Labs and vitals. Comfortable. Patient since initial admission to discharge has made significant clinical improvement. D/C planning. Close follow up with Dr. Palacio.

## 2018-07-20 NOTE — PROGRESS NOTE ADULT - SUBJECTIVE AND OBJECTIVE BOX
Patient is a 83y old  Male who presents with a chief complaint of complain of diarrhea, fever (06 Jul 2018 23:55)      HPI:  Pt is a 82 y/o M w/pmhx of Afib, CHF, CAD s/p stents, COPD, PNA, upper GI bleed (duodenal)  BIB EMS from [facility] assisted living for fever, abd pain, and diarrhea that began 3 weeks ago. Was in the hospital for PNA tx and was later dx with C-diff and started on a course of PO vancomycin for 10 days for the C-diff. States that he has had diarrhea since before the course of abx. Pain has been increased for the past few weeks and is characterized as a cramping feeling. Daughters also note that there is increased distension. Also notes chest pain characterized as a cramping in the central chest. 83% O2 sat noted this morning at the assisted living facility. Takes O2 routinely at night but not during the day. (06 Jul 2018 23:55)  Patient comfortable, continues to have loose stools.  Negative shortness of breath but remains on Ventimask.  Negative chest pain.  Has some burning in the perianal area.  Perianal area is erythematous and sore.  Fecal bag is in place    PAST MEDICAL & SURGICAL HISTORY:  Diastolic CHF  Peripheral vascular disease  Afib  Anemia  CKD (chronic kidney disease)  COPD (chronic obstructive pulmonary disease)  SHAYY (obstructive sleep apnea)  Sepsis, due to unspecified organism: 2/2 poorly healing wounds b/l  Dyspepsia: On moderate exertion.  Sleep apnea, obstructive: Requires home 02 therapy, and treatment with BIPAP  Atelectasis  Pleural effusion, bilateral  Respiratory failure  Peripheral edema  CRI (chronic renal insufficiency)  Gout  Benign prostatic hypertrophy  Spinal stenosis  Hypercholesterolemia  GERD (gastroesophageal reflux disease)  CAD (coronary artery disease)  Hypertension  S/P angioplasty with stent  Cataract of left eye  Prostate: Surgery green light procedure.  S/P rotator cuff surgery: Right  S/P angioplasty      MEDICATIONS  (STANDING):  allopurinol 100 milliGRAM(s) Oral daily  aspirin  chewable 81 milliGRAM(s) Oral daily  buDESOnide   0.5 milliGRAM(s) Respule 0.5 milliGRAM(s) Inhalation two times a day  cholestyramine Powder (Sugar-Free) 4 Gram(s) Oral two times a day  diltiazem    Tablet 30 milliGRAM(s) Oral every 6 hours  doxazosin 8 milliGRAM(s) Oral at bedtime  epoetin nelly Injectable 3000 Unit(s) IV Push <User Schedule>  epoetin nelly Injectable 4000 Unit(s) IV Push <User Schedule>  finasteride 5 milliGRAM(s) Oral daily  heparin  Injectable 5000 Unit(s) SubCutaneous every 12 hours  isosorbide   mononitrate ER Tablet (IMDUR) 120 milliGRAM(s) Oral daily  metoprolol tartrate 25 milliGRAM(s) Oral two times a day  simvastatin 10 milliGRAM(s) Oral at bedtime  sodium ferric gluconate complex Injectable 125 milliGRAM(s) IV Push <User Schedule>  vancomycin    Solution 500 milliGRAM(s) Oral every 6 hours    MEDICATIONS  (PRN):  acetaminophen   Tablet 650 milliGRAM(s) Oral every 8 hours PRN For Temp greater than 38 C (100.4 F)  acetaminophen   Tablet. 650 milliGRAM(s) Oral every 8 hours PRN Mild Pain (1 - 3)  albumin human 25% IVPB 50 milliLiter(s) IV Intermittent <User Schedule> PRN sbp<=120mmhg  ALBUTerol   0.5% 2.5 milliGRAM(s) Nebulizer every 4 hours PRN Shortness of Breath and/or Wheezing  diphenhydrAMINE   Capsule 25 milliGRAM(s) Oral at bedtime PRN sleep  HYDROmorphone  Injectable 0.5 milliGRAM(s) IV Push every 6 hours PRN Severe Pain (7 - 10)  ondansetron Injectable 4 milliGRAM(s) IV Push every 6 hours PRN Nausea and/or Vomiting      Allergies    Zosyn (Rash)    Intolerances        SOCIAL HISTORY:NC    FAMILY HISTORY:  No pertinent family history in first degree relatives      REVIEW OF SYSTEMS:    CONSTITUTIONAL: No weakness, fevers or chills  EYES/ENT: No visual changes;  No vertigo or throat pain   NECK: No pain or stiffness  RESPIRATORY: No cough, wheezing, hemoptysis; No shortness of breath  CARDIOVASCULAR: No chest pain or palpitations  GENITOURINARY: No dysuria, frequency or hematuria  NEUROLOGICAL: No numbness or weakness  SKIN: No itching, burning, rashes, or lesions   All other review of systems is negative unless indicated above.    Vital Signs Last 24 Hrs  T(C): 36.9 (20 Jul 2018 05:00), Max: 37.2 (20 Jul 2018 00:00)  T(F): 98.4 (20 Jul 2018 05:00), Max: 98.9 (20 Jul 2018 00:00)  HR: 120 (20 Jul 2018 07:43) (69 - 121)  BP: 100/44 (20 Jul 2018 06:00) (93/34 - 133/42)  BP(mean): 58 (20 Jul 2018 06:00) (49 - 69)  RR: 17 (20 Jul 2018 06:00) (10 - 24)  SpO2: 94% (20 Jul 2018 06:00) (87% - 100%)    PHYSICAL EXAM:    Constitutional: NAD, well-developed  HEENT: EOMI, throat clear  Neck: No LAD, supple  Respiratory: CTA and P  Cardiovascular: S1 and S2, RRR, no M  Gastrointestinal: BS+, soft, NT/ND, neg HSM,  Extremities: pos edema  Neurological: A/O x 3, no focal deficits  Psychiatric: Normal mood, normal affect  Skin: No rashes    LABS:  CBC Full  -  ( 20 Jul 2018 05:45 )  WBC Count : 10.22 K/uL  Hemoglobin : 8.2 g/dL  Hematocrit : 26.0 %  Platelet Count - Automated : 239 K/uL  Mean Cell Volume : 91.9 fl  Mean Cell Hemoglobin : 29.0 pg  Mean Cell Hemoglobin Concentration : 31.5 gm/dL  Auto Neutrophil # : x  Auto Lymphocyte # : x  Auto Monocyte # : x  Auto Eosinophil # : x  Auto Basophil # : x  Auto Neutrophil % : x  Auto Lymphocyte % : x  Auto Monocyte % : x  Auto Eosinophil % : x  Auto Basophil % : x    07-20    142  |  107  |  28<H>  ----------------------------<  92  3.5   |  26  |  3.81<H>    Ca    7.2<L>      20 Jul 2018 05:45  Phos  4.1     07-20  Mg     1.7     07-20    TPro  x   /  Alb  2.1<L>  /  TBili  x   /  DBili  x   /  AST  x   /  ALT  x   /  AlkPhos  x   07-20        07-10 @ 16:30  Hep A Igm Nonreact  Hep A total ab, IgA and M --  Hep B core Ab total --  Hep B core IgM Nonreact  Hep B DNA PCR --  Hep BSAg --  Hep BSAb --  Hep BSAb --  HCV Ab --  HCV RNA Log --  HCV RNA interp --                RADIOLOGY & ADDITIONAL STUDIES:

## 2018-07-20 NOTE — DISCHARGE NOTE ADULT - ADDITIONAL INSTRUCTIONS
Follow up with Dr. Hernández after Vancomycin taper is finished.  Follow up with Pulmonologist Dr. Maxwell after discharge for follow up of Respiratory Failure and necessity for sleep study.  Follow up with Nephrologist Dr. Rancho Sanabria for continuity of care regarding hemodialysis and kidney health.  Follow up with Primary Care Physician within one week of discharge from hospital for continuity of care and medication management.  Follow up with Cardiologist Dr. Lance in outpatient setting. Follow up with Dr. Hernández after Vancomycin taper is finished.  Follow up with Pulmonologist Dr. Maxwell if SOB or symptoms of sleep apnea recur.   Follow up with Nephrologist Dr. Rancho Sanabria for continuity of care regarding hemodialysis and kidney health.  Follow up with Primary Care Physician within one week of discharge from hospital for continuity of care and medication management.  Follow up with Cardiologist Dr. Lance in outpatient setting.

## 2018-07-20 NOTE — PROGRESS NOTE ADULT - ASSESSMENT
82 y/o M w/pmhx of Afib, CHF, CAD s/p stents, COPD, PNA, upper GI bleed (duodenal)  BIB EMS from [facility] assisted [facility] for fever, abd pain, and diarrhea that began 3 weeks ago. Was in the hospital for PNA tx and was later dx with C-diff and started on a course of PO vancomycin for 10 days for C-diff. Cardiology called for abnormal tele findings, abnormal EKG.     1. Mobitz II- EP consult appreciated. No recurrent events. He will need reevaluation for sleep apnea as outpt. Bradycardia thought to be vagally induced.   Now SR/Stach.    2. NSVT, bigeminy- keep K>4, Mag>2. Cont CCB for now. Cont to follow on tele. Echo with normal LV fxn, mod MR.     3. Renal  insufficiency - HD as per renal. S/p permacath placement yesterday.     4. Abdominal pain - Management pre primary team. Related to CDiff- cont abx. GI following.    5. Atrial fibrillation- off anticoagulation secondary to severe GI bleed in past requiring 15 units of pRBCs.  Now in SR. Currently on CCB/Bbl. HR well controlled in 60s-70s. Can change to long active cardizem upon discharge. Cont  Bbl as well.     6. DVT proph. Pain control with PRN dilaudid.    7. Sinus tachycardia- 110-120s. Cardizem restarted. HD today as well. Follow for now.

## 2018-07-20 NOTE — PROGRESS NOTE ADULT - ASSESSMENT
84 y/o wm with hx of ckd stage 3 ( scr 1.5-1.7) with ARYA due to volume depletion from CDiff associated colitis and diarrhea in presence of diuretic use PTA.  Now with non anion gap metabolic acidosis and worsening renal parameters.  CXR with mild CHF with hx of diastolic CHF.    PLAN  - obtain abd and lactate  - will change ivf and add bicarbonate and keep at current rate  - hold lasix done.   - fu uop and scr closely and will monitor respiratory status  - bp and hr stable now, cardizem adjusted and lopressor added    7/9 MK  - ARYA: worsening renal parameters with metabolic acidosis, non ag.  Previous lactate WNL and since placed on bicarbonate drip  fu repeat abg today.  Increase IVF rate  possibility of HD discussed with enio, hcp daughter, pt has been agreeable in past.   DC hydralazine  dc morphine and change to dilaudid  - Cdiff with rising scr and worsening abd distension: CRS consult ongoing  possible repeat ct scan  DW Dr ballesteros    7/10  Anuric  anasarca  Non anion gap acidosis on bicarb drip  ARYA, anuric  CKD 3 history  d/w Family, and pt agreeable  called ICU for line placement and to dialyze the pt in ICU for monitoring    7/10  HD initiated via R temp HD catheter  tolerating well  no complaints  good abf    7/11 SY  --ARYA with Anasarca.  Urine output slightly improved.  However, remains quite fluid overloaded.  Will plan to continue HD until fluid status is much improved.  HD order written.  3 hour tx today.  Will aim to remove 2.5 kg as tolerated.  --C DIff colitis ; continue to monitor closely.    7/12 SY  --ARYA with Anasarca.   Remains Oligo-anuric.    --HD with goal of 2.5 kg UF again today.  --C Diff colitis.   Clinically improved   Continue to monitor.    7/13  The patient was dialyzed 3 days in a row this week  Discussed with the patient's daughter and hospitalist, intensivist  Will skip dialysis today, no urgent need for dialysis  We'll dialyze the patient tomorrow on Saturday and then skip Sunday to dialyze the patient again on Monday.    7/14  We'll dialyze against 4 K bath  Case discussed with the patient's daughter  May need a permanent catheter by Monday      7/15  Dialyze with a 4K bath  Potassium is 3.3 most likely from the diarrhea  Schedule for permacath placement after dialysis on Monday or Tuesday  Patient is comfortable no complications noted at this time    7/16 MK  - ARYA/ CKD stage 3 remains oliguirc and HD dependent.  LImited UF at HD toleated with the   hypokalemia corrected with HD.  Permcath planned today  - Cdiff: on PO vanc.  improved leukocytosis and abd distension     7/17  s/p perm cath  HD tomorrow  4 K bath  replace K   overall stable    7/18 SY  --ARYA/CKD for now remaining dialysis dependent.  Continue TIW HD.  --C Diff colitis ; improving clinically.    7/19 SY  --ARYA/CKD : Continue TIW HD.   Will continue as outpatient for now as no signs of recovery at this point.  --C Diff colitis  : clinically improving.  Continue po abtx.  --D/c Planning  D/w pt's daughter re : rehab with HD.  --Replete K.  Continue regular diet without restrictions for now.    7/20 MK  - ARYA/CKD remains HD dependent with oliguria.  Will attempt UF with HD.  K correction with HD and changed to regular diet with fluid restriction  - Cdiff: PO vanc with improving renal paramenters-  - Episodes of Desat: will have pulmonary re-eval prior to dc.  - DC planning rehab with dialysis 82 y/o wm with hx of ckd stage 3 ( scr 1.5-1.7) with ARYA due to volume depletion from CDiff associated colitis and diarrhea in presence of diuretic use PTA.  Now with non anion gap metabolic acidosis and worsening renal parameters.  CXR with mild CHF with hx of diastolic CHF.    PLAN  - obtain abd and lactate  - will change ivf and add bicarbonate and keep at current rate  - hold lasix done.   - fu uop and scr closely and will monitor respiratory status  - bp and hr stable now, cardizem adjusted and lopressor added    7/9 MK  - ARYA: worsening renal parameters with metabolic acidosis, non ag.  Previous lactate WNL and since placed on bicarbonate drip  fu repeat abg today.  Increase IVF rate  possibility of HD discussed with enio, hcp daughter, pt has been agreeable in past.   DC hydralazine  dc morphine and change to dilaudid  - Cdiff with rising scr and worsening abd distension: CRS consult ongoing  possible repeat ct scan  DW Dr ballesteros    7/10  Anuric  anasarca  Non anion gap acidosis on bicarb drip  ARYA, anuric  CKD 3 history  d/w Family, and pt agreeable  called ICU for line placement and to dialyze the pt in ICU for monitoring    7/10  HD initiated via R temp HD catheter  tolerating well  no complaints  good abf    7/11 SY  --ARYA with Anasarca.  Urine output slightly improved.  However, remains quite fluid overloaded.  Will plan to continue HD until fluid status is much improved.  HD order written.  3 hour tx today.  Will aim to remove 2.5 kg as tolerated.  --C DIff colitis ; continue to monitor closely.    7/12 SY  --ARYA with Anasarca.   Remains Oligo-anuric.    --HD with goal of 2.5 kg UF again today.  --C Diff colitis.   Clinically improved   Continue to monitor.    7/13  The patient was dialyzed 3 days in a row this week  Discussed with the patient's daughter and hospitalist, intensivist  Will skip dialysis today, no urgent need for dialysis  We'll dialyze the patient tomorrow on Saturday and then skip Sunday to dialyze the patient again on Monday.    7/14  We'll dialyze against 4 K bath  Case discussed with the patient's daughter  May need a permanent catheter by Monday      7/15  Dialyze with a 4K bath  Potassium is 3.3 most likely from the diarrhea  Schedule for permacath placement after dialysis on Monday or Tuesday  Patient is comfortable no complications noted at this time    7/16 MK  - ARYA/ CKD stage 3 remains oliguirc and HD dependent.  LImited UF at HD toleated with the   hypokalemia corrected with HD.  Permcath planned today  - Cdiff: on PO vanc.  improved leukocytosis and abd distension     7/17  s/p perm cath  HD tomorrow  4 K bath  replace K   overall stable    7/18 SY  --ARYA/CKD for now remaining dialysis dependent.  Continue TIW HD.  --C Diff colitis ; improving clinically.    7/19 SY  --ARYA/CKD : Continue TIW HD.   Will continue as outpatient for now as no signs of recovery at this point.  --C Diff colitis  : clinically improving.  Continue po abtx.  --D/c Planning  D/w pt's daughter re : rehab with HD.  --Replete K.  Continue regular diet without restrictions for now.    7/20 MK  - ARYA/CKD remains HD dependent with oliguria.  Will attempt UF with HD.  K correction with HD and changed to regular diet with fluid restriction  - Cdiff: PO vanc with improving renal paramenters-  - Episodes of Desat: will have pulmonary re-eval prior to dc.  - DC planning rehab with dialysis  jerald Andrade  will see pt

## 2018-07-20 NOTE — PROGRESS NOTE ADULT - PROBLEM SELECTOR PLAN 1
-Vancomycin 500mg PO q6h, Day 12  -s/p Flagyl 10 days  -ID appreciated, continue Vancomycin  -GI appreciated, taper regimen structured  -Colorectal Consult appreciated, would not consider intervention at this time

## 2018-07-20 NOTE — PROGRESS NOTE ADULT - SUBJECTIVE AND OBJECTIVE BOX
CHIEF COMPLAINT: Diarrhea/Fever    SUBJECTIVE: Pt is a 84 y/o M w/pmhx of Afib, CHF, CAD s/p stents, COPD, recent PNA on abx, upper GI bleed (duodenal)  BIB EMS from [facility] for fever, abd pain, and diarrhea that began 3 weeks ago. Was in the hospital for PNA tx and was later dx with C-diff and started on a course of PO vancomycin for 10 days for the C-diff. States that he has had diarrhea since before the course of abx. Pain has been increased for the past few weeks and is characterized as a cramping feeling. Daughters also note that there is increased distension. Also notes chest pain characterized as a cramping in the central chest. 83% O2 sat noted morning of admission on 7/6/18, at the assisted living facility. Takes O2 routinely at night but not during the day.    7/20/18 - Patient seen and examined at bedside with daughter Ivis Present. Again discussed reasons for patient not to be discharged, desaturation episodes, especially at night, likely decreased respiratory drive, some SHAYY as per Cardio note. Patient for HD today, will check up after, likely SOB due to fluid overload.    REVIEW OF SYSTEMS:  CONSTITUTIONAL: +weakness, no fevers or chills  EYES/ENT: No visual changes;  No vertigo or throat pain   NECK: No pain or stiffness  RESPIRATORY: No cough, wheezing, hemoptysis; + shortness of breath without supp O2, now on NC  CARDIOVASCULAR: No chest pain or palpitations  GASTROINTESTINAL: No abdominal or epigastric pain. No nausea, vomiting, or hematemesis; +diarrhea, no constipation. No melena or hematochezia.  GENITOURINARY: No dysuria, frequency or hematuria  NEUROLOGICAL: No numbness or weakness  SKIN: No itching, burning, rashes, or lesions   All other review of systems is negative unless indicated above    Vital Signs Last 24 Hrs  T(C): 36.9 (20 Jul 2018 05:00), Max: 37.2 (20 Jul 2018 00:00)  T(F): 98.4 (20 Jul 2018 05:00), Max: 98.9 (20 Jul 2018 00:00)  HR: 120 (20 Jul 2018 07:43) (69 - 121)  BP: 100/44 (20 Jul 2018 06:00) (93/34 - 133/42)  BP(mean): 58 (20 Jul 2018 06:00) (49 - 69)  RR: 17 (20 Jul 2018 06:00) (10 - 24)  SpO2: 94% (20 Jul 2018 06:00) (87% - 100%)    I&O's Summary    17 Jul 2018 07:01  -  18 Jul 2018 07:00  --------------------------------------------------------  IN: 340 mL / OUT: 0 mL / NET: 340 mL    18 Jul 2018 07:01  -  18 Jul 2018 15:05  --------------------------------------------------------  IN: 500 mL / OUT: 0 mL / NET: 500 mL    PHYSICAL EXAM:    Constitutional: NAD, somnolent, frail appearing+  HEENT: PERR, EOMI, Normal Hearing, MMM  Neck: Soft and supple, No LAD, No JVD  Respiratory: Breath sounds are clear bilaterally, No wheezing, rales or rhonchi  Cardiovascular: S1 and S2, regular rate and rhythm, no Murmurs, gallops or rubs  Gastrointestinal: Bowel Sounds present, soft, nontender, Distended+, no guarding, no rebound  Extremities: LLE lesion with dressings c/d/i, RLE s/p AKA +  Vascular: 2+ peripheral pulses  Neurological: A/O x 3, no focal deficits  Musculoskeletal: unable to assess  Skin: Incontinence associated Dermatitis with Rectal tube+ output green/loose stool    MEDICATIONS  (STANDING):  allopurinol 100 milliGRAM(s) Oral daily  aspirin  chewable 81 milliGRAM(s) Oral daily  buDESOnide   0.5 milliGRAM(s) Respule 0.5 milliGRAM(s) Inhalation two times a day  cholestyramine Powder (Sugar-Free) 4 Gram(s) Oral two times a day  diltiazem    Tablet 30 milliGRAM(s) Oral every 6 hours  doxazosin 8 milliGRAM(s) Oral at bedtime  epoetin nelly Injectable 3000 Unit(s) IV Push <User Schedule>  epoetin nelly Injectable 4000 Unit(s) IV Push <User Schedule>  finasteride 5 milliGRAM(s) Oral daily  heparin  Injectable 5000 Unit(s) SubCutaneous every 12 hours  isosorbide   mononitrate ER Tablet (IMDUR) 120 milliGRAM(s) Oral daily  metoprolol tartrate 25 milliGRAM(s) Oral two times a day  simvastatin 10 milliGRAM(s) Oral at bedtime  vancomycin    Solution 500 milliGRAM(s) Oral every 6 hours    MEDICATIONS  (PRN):  acetaminophen   Tablet 650 milliGRAM(s) Oral every 8 hours PRN For Temp greater than 38 C (100.4 F)  acetaminophen   Tablet. 650 milliGRAM(s) Oral every 8 hours PRN Mild Pain (1 - 3)  albumin human 25% IVPB 50 milliLiter(s) IV Intermittent <User Schedule> PRN sbp<=120mmhg  ALBUTerol   0.5% 2.5 milliGRAM(s) Nebulizer every 4 hours PRN Shortness of Breath and/or Wheezing  diphenhydrAMINE   Capsule 25 milliGRAM(s) Oral at bedtime PRN sleep  HYDROmorphone  Injectable 0.5 milliGRAM(s) IV Push every 6 hours PRN Severe Pain (7 - 10)  ondansetron Injectable 4 milliGRAM(s) IV Push every 6 hours PRN Nausea and/or Vomiting      LABS: All Labs Reviewed:                                    8.2    10.22 )-----------( 239      ( 20 Jul 2018 05:45 )             26.0   07-20    142  |  107  |  28<H>  ----------------------------<  92  3.5   |  26  |  3.81<H>    Ca    7.2<L>      20 Jul 2018 05:45  Phos  4.1     07-20  Mg     1.7     07-20    TPro  x   /  Alb  2.1<L>  /  TBili  x   /  DBili  x   /  AST  x   /  ALT  x   /  AlkPhos  x   07-20      Blood Culture:   Culture - Urine (07.10.18 @ 16:30)    Specimen Source: .Urine Catheterized    Culture Results:   <10,000 CFU/ml  Normal Urogenital ravinder present    Culture - Blood (07.06.18 @ 13:42)    Specimen Source: .Blood Blood-Peripheral    Culture Results:   No growth at 5 days.      RADIOLOGY/EKG:  < from: Xray Chest 1 View- PORTABLE-Urgent (07.16.18 @ 18:34) >  IMPRESSION:    Findings suggesting persistent mild CHF with mild pulmonary vascular   congestion and trace fluid in the right minor fissure.    Stable right CVC.    < end of copied text >    < from: Transthoracic Echocardiogram (07.10.18 @ 10:21) >   Impression     Summary     Fibrocalcific changes noted to the mitral valve leaflets with preserved   leaflet excursion.   Moderate (2+) mitral regurgitation is present.   The left atrium is moderately dilated.   Mild concentric left ventricular hypertrophy is present.   Estimated left ventricular ejection fraction is 55-60 %.    < end of copied text >    DVT PPX: NO AC due to severe GI bleed in past

## 2018-07-20 NOTE — DISCHARGE NOTE ADULT - CARE PROVIDER_API CALL
Devante Hernández), Gastroenterology  [address] [place] [address]  Phone: [phone]  Fax: [phone]    OPAL Maxwell (), Pulmonary Disease; Sleep Medicine  [address] [place] [address]  Phone: [phone]  Fax: [phone]    Rancho Sanabria (DO), Nephrology  [address] [facility]  [address] [place] [address]  Phone: [phone]  Fax: [phone]

## 2018-07-20 NOTE — DISCHARGE NOTE ADULT - MEDICATION SUMMARY - MEDICATIONS TO STOP TAKING
I will STOP taking the medications listed below when I get home from the hospital:  None I will STOP taking the medications listed below when I get home from the hospital:    hydrALAZINE 50 mg oral tablet  -- 1 tab(s) by mouth once a day    metoprolol tartrate 25 mg oral tablet  -- 1 tab(s) by mouth 2 times a day    amLODIPine 5 mg oral tablet  -- 1 tab(s) by mouth once a day    ferrous sulfate 325 mg (65 mg elemental iron) oral delayed release tablet  -- 1 tab(s) by mouth once a day    pantoprazole 40 mg oral delayed release tablet  -- 1 tab(s) by mouth once a day    isosorbide mononitrate 120 mg oral tablet, extended release  -- 1 tab(s) by mouth once a day    senna oral tablet  -- 2 tab(s) by mouth once a day (at bedtime)    docusate sodium 100 mg oral capsule  -- 1 cap(s) by mouth 2 times a day    Lasix 40 mg oral tablet  -- 1 tab(s) by mouth once a day

## 2018-07-20 NOTE — DISCHARGE NOTE ADULT - CARE PLAN
Principal Discharge DX:	Pseudomembranous colitis  Goal:	Relieve Infection  Assessment and plan of treatment:	Tapering of Vancomycin as follows:  Vancomycin 125mg PO 4x a day for one week. Then 3 times a day for 2 weeks, then twice a day for one week, then daily for one week, then every other day for 1 week, then every 3rd day for 1 week.  -After Vancomycin taper follow up in outpatient with Dr. Hernández  Secondary Diagnosis:	Acute respiratory failure with hypoxia  Goal:	resolve Shortness of Breath  Assessment and plan of treatment:	Maintain fluid balance with Hemodialysis. Encourage use of Incentive Spirometry, and physical rehabilitation to strengthen body and lungs. Sleep Study in outpatient recommended due to history of Obstructive Sleep Apnea, follow up with Dr. Maxwell in outpatient setting after discharge.  Secondary Diagnosis:	Renal insufficiency  Goal:	Maintain Kidney Health  Assessment and plan of treatment:	Patient will stay on Hemodialysis during Rehab 3x a week, follow up with Nephrologist Dr. Sanabria in outpatient setting.  Secondary Diagnosis:	Afib  Goal:	Rate Control  Assessment and plan of treatment:	Patient is currently rate controlled and in sinus rhythm. Due to history of major GI Bleeds, no indication for anticoagulation at this time. Patient to remain on Diltiazem outpatient for rate control. Principal Discharge DX:	Pseudomembranous colitis  Goal:	Relieve Infection  Assessment and plan of treatment:	Tapering of Vancomycin as follows:  Vancomycin 500mg PO 4x a day for one week. Then 3 times a day for 2 weeks, then twice a day for one week, then daily for one week, then every other day for 1 week, then every 3rd day for 1 week.  -After Vancomycin taper follow up in outpatient with Dr. Hernández  Secondary Diagnosis:	Acute respiratory failure with hypoxia  Goal:	resolve Shortness of Breath  Assessment and plan of treatment:	Maintain fluid balance with Hemodialysis. Encourage use of Incentive Spirometry, and physical rehabilitation to strengthen body and lungs. Initially with SOB on admission due to moderate ascites and was recommended for sleep study but symptoms resolved once fluid status improved.  Secondary Diagnosis:	Renal insufficiency  Goal:	Maintain Kidney Health  Assessment and plan of treatment:	Patient will stay on Hemodialysis during Rehab 3x a week, follow up with Nephrologist Dr. Sanabria in outpatient setting.  Secondary Diagnosis:	Afib  Goal:	Rate Control  Assessment and plan of treatment:	Patient is currently rate controlled and in sinus rhythm. Due to history of major GI Bleeds, no indication for anticoagulation at this time. Patient to remain on Diltiazem outpatient for rate control. Principal Discharge DX:	Pseudomembranous colitis  Goal:	Relieve Infection  Assessment and plan of treatment:	- Tapering of Vancomycin as follows: Vancomycin 500mg PO 4 x a day for one week. Then 3 times a day for 2 weeks, then twice a day for one week, then daily for one week, then every other day for 1 week, then every 3rd day for 1 week.  - After Vancomycin taper follow up in outpatient with Dr. Hernández  Secondary Diagnosis:	Acute respiratory failure with hypoxia  Goal:	resolve Shortness of Breath  Assessment and plan of treatment:	Maintain fluid balance with Hemodialysis. Encourage use of Incentive Spirometry, and physical rehabilitation to strengthen body and lungs. Initially with SOB on admission due to moderate ascites and was recommended for sleep study but symptoms resolved once fluid status improved.  Secondary Diagnosis:	Renal insufficiency  Goal:	Maintain Kidney Health  Assessment and plan of treatment:	Patient will stay on Hemodialysis during Rehab 3x a week, follow up with Nephrologist Dr. Sanabria in outpatient setting.  Secondary Diagnosis:	Afib  Goal:	Rate Control  Assessment and plan of treatment:	Patient is currently rate controlled and in sinus rhythm. Due to history of major GI Bleeds, no indication for anticoagulation at this time. Patient to remain on Diltiazem outpatient for rate control.

## 2018-07-21 LAB
ANION GAP SERPL CALC-SCNC: 4 MMOL/L — LOW (ref 5–17)
BUN SERPL-MCNC: 24 MG/DL — HIGH (ref 7–23)
CALCIUM SERPL-MCNC: 7.5 MG/DL — LOW (ref 8.5–10.1)
CHLORIDE SERPL-SCNC: 107 MMOL/L — SIGNIFICANT CHANGE UP (ref 96–108)
CO2 SERPL-SCNC: 28 MMOL/L — SIGNIFICANT CHANGE UP (ref 22–31)
CREAT SERPL-MCNC: 3.31 MG/DL — HIGH (ref 0.5–1.3)
GLUCOSE SERPL-MCNC: 101 MG/DL — HIGH (ref 70–99)
HCT VFR BLD CALC: 24.5 % — LOW (ref 39–50)
HGB BLD-MCNC: 7.5 G/DL — LOW (ref 13–17)
MAGNESIUM SERPL-MCNC: 1.9 MG/DL — SIGNIFICANT CHANGE UP (ref 1.6–2.6)
MCHC RBC-ENTMCNC: 29.2 PG — SIGNIFICANT CHANGE UP (ref 27–34)
MCHC RBC-ENTMCNC: 30.6 GM/DL — LOW (ref 32–36)
MCV RBC AUTO: 95.3 FL — SIGNIFICANT CHANGE UP (ref 80–100)
NRBC # BLD: 0 /100 WBCS — SIGNIFICANT CHANGE UP (ref 0–0)
PLATELET # BLD AUTO: 225 K/UL — SIGNIFICANT CHANGE UP (ref 150–400)
POTASSIUM SERPL-MCNC: 3.2 MMOL/L — LOW (ref 3.5–5.3)
POTASSIUM SERPL-SCNC: 3.2 MMOL/L — LOW (ref 3.5–5.3)
RBC # BLD: 2.57 M/UL — LOW (ref 4.2–5.8)
RBC # FLD: 20.7 % — HIGH (ref 10.3–14.5)
SODIUM SERPL-SCNC: 139 MMOL/L — SIGNIFICANT CHANGE UP (ref 135–145)
WBC # BLD: 9.09 K/UL — SIGNIFICANT CHANGE UP (ref 3.8–10.5)
WBC # FLD AUTO: 9.09 K/UL — SIGNIFICANT CHANGE UP (ref 3.8–10.5)

## 2018-07-21 PROCEDURE — 74176 CT ABD & PELVIS W/O CONTRAST: CPT | Mod: 26

## 2018-07-21 RX ORDER — IPRATROPIUM/ALBUTEROL SULFATE 18-103MCG
3 AEROSOL WITH ADAPTER (GRAM) INHALATION ONCE
Qty: 0 | Refills: 0 | Status: DISCONTINUED | OUTPATIENT
Start: 2018-07-21 | End: 2018-08-09

## 2018-07-21 RX ORDER — POTASSIUM CHLORIDE 20 MEQ
40 PACKET (EA) ORAL EVERY 4 HOURS
Qty: 0 | Refills: 0 | Status: DISCONTINUED | OUTPATIENT
Start: 2018-07-21 | End: 2018-07-21

## 2018-07-21 RX ORDER — POTASSIUM CHLORIDE 20 MEQ
10 PACKET (EA) ORAL
Qty: 0 | Refills: 0 | Status: DISCONTINUED | OUTPATIENT
Start: 2018-07-21 | End: 2018-07-21

## 2018-07-21 RX ORDER — POTASSIUM CHLORIDE 20 MEQ
40 PACKET (EA) ORAL EVERY 4 HOURS
Qty: 0 | Refills: 0 | Status: COMPLETED | OUTPATIENT
Start: 2018-07-21 | End: 2018-07-21

## 2018-07-21 RX ADMIN — Medication 40 MILLIEQUIVALENT(S): at 11:20

## 2018-07-21 RX ADMIN — CHOLESTYRAMINE 4 GRAM(S): 4 POWDER, FOR SUSPENSION ORAL at 10:44

## 2018-07-21 RX ADMIN — CHOLESTYRAMINE 4 GRAM(S): 4 POWDER, FOR SUSPENSION ORAL at 22:16

## 2018-07-21 RX ADMIN — Medication 100 MILLIGRAM(S): at 12:08

## 2018-07-21 RX ADMIN — Medication 8 MILLIGRAM(S): at 22:16

## 2018-07-21 RX ADMIN — HYDROMORPHONE HYDROCHLORIDE 0.5 MILLIGRAM(S): 2 INJECTION INTRAMUSCULAR; INTRAVENOUS; SUBCUTANEOUS at 13:51

## 2018-07-21 RX ADMIN — HEPARIN SODIUM 5000 UNIT(S): 5000 INJECTION INTRAVENOUS; SUBCUTANEOUS at 17:23

## 2018-07-21 RX ADMIN — HYDROMORPHONE HYDROCHLORIDE 0.5 MILLIGRAM(S): 2 INJECTION INTRAMUSCULAR; INTRAVENOUS; SUBCUTANEOUS at 14:30

## 2018-07-21 RX ADMIN — HYDROMORPHONE HYDROCHLORIDE 0.5 MILLIGRAM(S): 2 INJECTION INTRAMUSCULAR; INTRAVENOUS; SUBCUTANEOUS at 01:50

## 2018-07-21 RX ADMIN — FINASTERIDE 5 MILLIGRAM(S): 5 TABLET, FILM COATED ORAL at 12:09

## 2018-07-21 RX ADMIN — Medication 40 MILLIEQUIVALENT(S): at 13:51

## 2018-07-21 RX ADMIN — HEPARIN SODIUM 5000 UNIT(S): 5000 INJECTION INTRAVENOUS; SUBCUTANEOUS at 05:06

## 2018-07-21 RX ADMIN — Medication 81 MILLIGRAM(S): at 12:08

## 2018-07-21 RX ADMIN — Medication 0.5 MILLIGRAM(S): at 17:37

## 2018-07-21 RX ADMIN — Medication 500 MILLIGRAM(S): at 00:25

## 2018-07-21 RX ADMIN — SIMVASTATIN 10 MILLIGRAM(S): 20 TABLET, FILM COATED ORAL at 22:15

## 2018-07-21 RX ADMIN — Medication 500 MILLIGRAM(S): at 05:06

## 2018-07-21 RX ADMIN — ONDANSETRON 4 MILLIGRAM(S): 8 TABLET, FILM COATED ORAL at 22:49

## 2018-07-21 RX ADMIN — HYDROMORPHONE HYDROCHLORIDE 0.5 MILLIGRAM(S): 2 INJECTION INTRAMUSCULAR; INTRAVENOUS; SUBCUTANEOUS at 02:05

## 2018-07-21 RX ADMIN — Medication 500 MILLIGRAM(S): at 17:23

## 2018-07-21 RX ADMIN — Medication 500 MILLIGRAM(S): at 12:10

## 2018-07-21 NOTE — PROGRESS NOTE ADULT - ASSESSMENT
1) Clostridium Difficile Colitis clinically improved  2) Metabolic Acidosis  3) Restrictive Ventilatory Disease  4) SHAYY  5) Bilateral Pleural Effusions  6) Renal failure on dilaysis now  7) Leukocytosis is clinically improved  8)s/p shiley placed    82 y/o M w/pmhx of Afib, CHF, CAD s/p stents, COPD, PNA, upper GI bleed (duodenal)  BIB EMS from [facility] assisted living for fever, abd pain, and diarrhea that began 3 weeks ago. Was in the hospital for ESBL and was later dx with C-diff and started on a course of PO vancomycin for 10 days for the C-diff. At the present time has diffuse abdominal pain, being treated Vancomycin and IV Metronidazole  ABG reviewed and reveals 7.18/39/68, [place] normal limits  Etiology likely from sepsis   repeat ABG noted  ABG - ( 09 Jul 2018 11:50 )  pH, Arterial: 7.21  pH, Blood: x     /  pCO2: 42    /  pO2: 67    / HCO3: 16    / Base Excess: -10.6 /  SaO2: 91      repeat cxr noted  At the present time, patient states he would like to be a full code  Continue monitoring hemodynamics (daughter states baseline diastolic tends to run between 25-40mmHg)   Monitor urine output aggressively   Used BIPAP/CPAP in the past (stopped as an outpatient after patient lost weight), may worsen intraabdominal pressures but if arrhythmias are present, may consider starting again.   Appreciate nephrology/cardiology/ID/GI/hospitalist recommendations  Will continue to monitor   hx of resp failure and intubation 2012 x 3-4 days  s/p Shiley and hemodialysis  still with abd distention but NGT is now discontinued and tolerated po yesterday  overall prognosis remains guarded  improving clinically with decreased leukocytosis  tolerating PO intake, monitor clinically  may need permannt cath placement tomorrow for dialysis  pulm status appears optimized for low risk procedure  fu cxr afterwards needed    7/21/2018  hx SHAYY and treated with BIPAP in the past / stopped using it after wt loss  I was asked by Dr Tati Tomas to re eval for reported episodes of desaturations  DTR at bedside  data discussed at length  pt's reported hypoxemic episodes after narcotics / while sleeping or having abd distention  he has known hx SHAYY / OHS and prior BIPAP 12/8 with O2 attached at home  this am after 35% VM was removed in his room, RA O2 sat 88%-93% range  no distress  no significant cough  occasional wheeze  added nebs  will need supplemental O2 at night 3 liters nc as well as exertional need for O2 supplement as outpt    Thank you for the consult

## 2018-07-21 NOTE — PROGRESS NOTE ADULT - PROBLEM SELECTOR PLAN 2
-continue to maintain fluid balance with HD TIW  -control HFpEF with medical management - Toprol 25mg BID  -Attempt to wean off O2  -Pulm appreciated, add nebs  -Incentive Spirometry  -CXR shows improvement of Pulmonary Vascular Congestion -continue to maintain fluid balance with HD TIW  -control HFpEF with medical management - Toprol 25mg BID  -Attempt to wean off O2  -Pulm appreciated, add nebs  -Incentive Spirometry  -CXR shows improvement of Pulmonary Vascular Congestion  -CT Abdomen showing Moderate Ascites - contributing to desaturation; Paracentesis for Monday.

## 2018-07-21 NOTE — PROGRESS NOTE ADULT - SUBJECTIVE AND OBJECTIVE BOX
CHIEF COMPLAINT: Diarrhea/Fever    SUBJECTIVE: Pt is a 82 y/o M w/pmhx of Afib, CHF, CAD s/p stents, COPD, recent PNA on abx, upper GI bleed (duodenal)  BIB EMS from [facility] for fever, abd pain, and diarrhea that began 3 weeks ago. Was in the hospital for PNA tx and was later dx with C-diff and started on a course of PO vancomycin for 10 days for the C-diff. States that he has had diarrhea since before the course of abx. Pain has been increased for the past few weeks and is characterized as a cramping feeling. Daughters also note that there is increased distension. Also notes chest pain characterized as a cramping in the central chest. 83% O2 sat noted morning of admission on 7/6/18, at the [facility] living facility. Takes O2 routinely at night but not during the day.    7/21/18 - Patient seen and examined at bedside. Patient with some complaints of abdominal discomfort, will order CT scan Abdomen. Patient has episodes of desaturating, especially overnight. Patient now on Venti at 35%, patient seen by pulm in morning, note appreciated will add nebs. CXR from yesterday shows improvement on pulmonary vascular congestion.    REVIEW OF SYSTEMS:  CONSTITUTIONAL: +weakness, no fevers or chills  EYES/ENT: No visual changes;  No vertigo or throat pain   NECK: No pain or stiffness  RESPIRATORY: No cough, wheezing, hemoptysis; + shortness of breath without supp O2, now on Venti  CARDIOVASCULAR: No chest pain or palpitations  GASTROINTESTINAL: + abdominal discomfort, without nausea, vomiting, or hematemesis; +diarrhea (improving), no constipation. No melena or hematochezia.  GENITOURINARY: No dysuria, frequency or hematuria  NEUROLOGICAL: No numbness or weakness    Vital Signs Last 24 Hrs  T(C): 37.2 (21 Jul 2018 10:00), Max: 37.2 (20 Jul 2018 22:00)  T(F): 98.9 (21 Jul 2018 10:00), Max: 98.9 (20 Jul 2018 22:00)  HR: 67 (21 Jul 2018 10:00) (67 - 112)  BP: 113/34 (21 Jul 2018 10:00) (99/38 - 154/34)  BP(mean): 54 (21 Jul 2018 10:00) (52 - 66)  RR: 20 (21 Jul 2018 10:00) (12 - 22)  SpO2: 86% (21 Jul 2018 10:00) (85% - 100%)SKIN: No itching, burning, rashes, or lesions   All other review of systems is negative unless indicated above        I&O's Summary    17 Jul 2018 07:01  -  18 Jul 2018 07:00  --------------------------------------------------------  IN: 340 mL / OUT: 0 mL / NET: 340 mL    18 Jul 2018 07:01  -  18 Jul 2018 15:05  --------------------------------------------------------  IN: 500 mL / OUT: 0 mL / NET: 500 mL    PHYSICAL EXAM:    Constitutional: NAD, somnolent, frail appearing+  HEENT: PERR, EOMI, Normal Hearing, MMM  Neck: Soft and supple, No LAD, No JVD  Respiratory: Breath sounds are clear bilaterally, No wheezing, rales or rhonchi  Cardiovascular: S1 and S2, regular rate and rhythm, no Murmurs, gallops or rubs  Gastrointestinal: Bowel Sounds present, soft, nontender, Distended+, +RLE guarding, no rigidity, no rebound  Extremities: LLE lesion with dressings c/d/i, RLE s/p AKA +  Vascular: 2+ peripheral pulses  Neurological: A/O x 3, no focal deficits  Musculoskeletal: unable to assess  Skin: Incontinence associated Dermatitis with Rectal tube+ output green/loose stool    MEDICATIONS  (STANDING):  ALBUTerol/ipratropium for Nebulization. 3 milliLiter(s) Nebulizer once  allopurinol 100 milliGRAM(s) Oral daily  aspirin  chewable 81 milliGRAM(s) Oral daily  buDESOnide   0.5 milliGRAM(s) Respule 0.5 milliGRAM(s) Inhalation two times a day  cholestyramine Powder (Sugar-Free) 4 Gram(s) Oral two times a day  diltiazem    Tablet 30 milliGRAM(s) Oral every 6 hours  doxazosin 8 milliGRAM(s) Oral at bedtime  epoetin nelly Injectable 3000 Unit(s) IV Push <User Schedule>  epoetin nelly Injectable 4000 Unit(s) IV Push <User Schedule>  finasteride 5 milliGRAM(s) Oral daily  heparin  Injectable 5000 Unit(s) SubCutaneous every 12 hours  potassium chloride    Tablet ER 40 milliEquivalent(s) Oral every 4 hours  simvastatin 10 milliGRAM(s) Oral at bedtime  sodium ferric gluconate complex Injectable 125 milliGRAM(s) IV Push <User Schedule>  vancomycin    Solution 500 milliGRAM(s) Oral every 6 hours    MEDICATIONS  (PRN):  acetaminophen   Tablet 650 milliGRAM(s) Oral every 8 hours PRN For Temp greater than 38 C (100.4 F)  acetaminophen   Tablet. 650 milliGRAM(s) Oral every 8 hours PRN Mild Pain (1 - 3)  albumin human 25% IVPB 50 milliLiter(s) IV Intermittent <User Schedule> PRN sbp<=120mmhg  ALBUTerol   0.5% 2.5 milliGRAM(s) Nebulizer every 4 hours PRN Shortness of Breath and/or Wheezing  diphenhydrAMINE   Capsule 25 milliGRAM(s) Oral at bedtime PRN sleep  HYDROmorphone  Injectable 0.5 milliGRAM(s) IV Push every 6 hours PRN Severe Pain (7 - 10)  ondansetron Injectable 4 milliGRAM(s) IV Push every 6 hours PRN Nausea and/or Vomiting        LABS: All Labs Reviewed:                                                  7.5    9.09  )-----------( 225      ( 21 Jul 2018 06:08 )             24.5   07-21    139  |  107  |  24<H>  ----------------------------<  101<H>  3.2<L>   |  28  |  3.31<H>    Ca    7.5<L>      21 Jul 2018 06:08  Phos  4.1     07-20  Mg     1.9     07-21    TPro  x   /  Alb  2.1<L>  /  TBili  x   /  DBili  x   /  AST  x   /  ALT  x   /  AlkPhos  x   07-20      Blood Culture:   Culture - Urine (07.10.18 @ 16:30)    Specimen Source: .Urine Catheterized    Culture Results:   <10,000 CFU/ml  Normal Urogenital ravinder present    Culture - Blood (07.06.18 @ 13:42)    Specimen Source: .Blood Blood-Peripheral    Culture Results:   No growth at 5 days.      RADIOLOGY/EKG:  < from: Xray Chest 1 View- PORTABLE-Urgent (07.16.18 @ 18:34) >  IMPRESSION:    Findings suggesting persistent mild CHF with mild pulmonary vascular   congestion and trace fluid in the right minor fissure.    Stable right CVC.    < end of copied text >    < from: Transthoracic Echocardiogram (07.10.18 @ 10:21) >   Impression     Summary     Fibrocalcific changes noted to the mitral valve leaflets with preserved   leaflet excursion.   Moderate (2+) mitral regurgitation is present.   The left atrium is moderately dilated.   Mild concentric left ventricular hypertrophy is present.   Estimated left ventricular ejection fraction is 55-60 %.    < end of copied text >    DVT PPX: NO AC due to severe GI bleed in past CHIEF COMPLAINT: Diarrhea/Fever    SUBJECTIVE: Pt is a 82 y/o M w/pmhx of Afib, CHF, CAD s/p stents, COPD, recent PNA on abx, upper GI bleed (duodenal)  BIB EMS from [facility] for fever, abd pain, and diarrhea that began 3 weeks ago. Was in the hospital for PNA tx and was later dx with C-diff and started on a course of PO vancomycin for 10 days for the C-diff. States that he has had diarrhea since before the course of abx. Pain has been increased for the past few weeks and is characterized as a cramping feeling. Daughters also note that there is increased distension. Also notes chest pain characterized as a cramping in the central chest. 83% O2 sat noted morning of admission on 7/6/18, at the assisted living facility. Takes O2 routinely at night but not during the day.    7/21/18 - Patient seen and examined at bedside. Patient with some complaints of abdominal discomfort, will order CT scan Abdomen. Patient has episodes of desaturating, especially overnight. Patient now on Venti at 35%, patient seen by pulm in morning, note appreciated will add nebs. CXR from yesterday shows improvement on pulmonary vascular congestion.   ADDENDUM: CT Scan shows Ascites, contributing to his SOB, Paracentesis ordered, CMP, PT/INR for AM.    REVIEW OF SYSTEMS:  CONSTITUTIONAL: +weakness, no fevers or chills  EYES/ENT: No visual changes;  No vertigo or throat pain   NECK: No pain or stiffness  RESPIRATORY: No cough, wheezing, hemoptysis; + shortness of breath without supp O2, now on Venti  CARDIOVASCULAR: No chest pain or palpitations  GASTROINTESTINAL: + abdominal discomfort, without nausea, vomiting, or hematemesis; +diarrhea (improving), no constipation. No melena or hematochezia.  GENITOURINARY: No dysuria, frequency or hematuria  NEUROLOGICAL: No numbness or weakness    Vital Signs Last 24 Hrs  T(C): 37.2 (21 Jul 2018 10:00), Max: 37.2 (20 Jul 2018 22:00)  T(F): 98.9 (21 Jul 2018 10:00), Max: 98.9 (20 Jul 2018 22:00)  HR: 67 (21 Jul 2018 10:00) (67 - 112)  BP: 113/34 (21 Jul 2018 10:00) (99/38 - 154/34)  BP(mean): 54 (21 Jul 2018 10:00) (52 - 66)  RR: 20 (21 Jul 2018 10:00) (12 - 22)  SpO2: 86% (21 Jul 2018 10:00) (85% - 100%)SKIN: No itching, burning, rashes, or lesions   All other review of systems is negative unless indicated above        I&O's Summary    17 Jul 2018 07:01  -  18 Jul 2018 07:00  --------------------------------------------------------  IN: 340 mL / OUT: 0 mL / NET: 340 mL    18 Jul 2018 07:01  -  18 Jul 2018 15:05  --------------------------------------------------------  IN: 500 mL / OUT: 0 mL / NET: 500 mL    PHYSICAL EXAM:    Constitutional: NAD, somnolent, frail appearing+  HEENT: PERR, EOMI, Normal Hearing, MMM  Neck: Soft and supple, No LAD, No JVD  Respiratory: Breath sounds are clear bilaterally, No wheezing, rales or rhonchi  Cardiovascular: S1 and S2, regular rate and rhythm, no Murmurs, gallops or rubs  Gastrointestinal: Bowel Sounds present, soft, nontender, Distended+, +RLE guarding, no rigidity, no rebound  Extremities: LLE lesion with dressings c/d/i, RLE s/p AKA +  Vascular: 2+ peripheral pulses  Neurological: A/O x 3, no focal deficits  Musculoskeletal: unable to assess  Skin: Incontinence associated Dermatitis with Rectal tube+ output green/loose stool    MEDICATIONS  (STANDING):  ALBUTerol/ipratropium for Nebulization. 3 milliLiter(s) Nebulizer once  allopurinol 100 milliGRAM(s) Oral daily  aspirin  chewable 81 milliGRAM(s) Oral daily  buDESOnide   0.5 milliGRAM(s) Respule 0.5 milliGRAM(s) Inhalation two times a day  cholestyramine Powder (Sugar-Free) 4 Gram(s) Oral two times a day  diltiazem    Tablet 30 milliGRAM(s) Oral every 6 hours  doxazosin 8 milliGRAM(s) Oral at bedtime  epoetin nelly Injectable 3000 Unit(s) IV Push <User Schedule>  epoetin nelly Injectable 4000 Unit(s) IV Push <User Schedule>  finasteride 5 milliGRAM(s) Oral daily  heparin  Injectable 5000 Unit(s) SubCutaneous every 12 hours  potassium chloride    Tablet ER 40 milliEquivalent(s) Oral every 4 hours  simvastatin 10 milliGRAM(s) Oral at bedtime  sodium ferric gluconate complex Injectable 125 milliGRAM(s) IV Push <User Schedule>  vancomycin    Solution 500 milliGRAM(s) Oral every 6 hours    MEDICATIONS  (PRN):  acetaminophen   Tablet 650 milliGRAM(s) Oral every 8 hours PRN For Temp greater than 38 C (100.4 F)  acetaminophen   Tablet. 650 milliGRAM(s) Oral every 8 hours PRN Mild Pain (1 - 3)  albumin human 25% IVPB 50 milliLiter(s) IV Intermittent <User Schedule> PRN sbp<=120mmhg  ALBUTerol   0.5% 2.5 milliGRAM(s) Nebulizer every 4 hours PRN Shortness of Breath and/or Wheezing  diphenhydrAMINE   Capsule 25 milliGRAM(s) Oral at bedtime PRN sleep  HYDROmorphone  Injectable 0.5 milliGRAM(s) IV Push every 6 hours PRN Severe Pain (7 - 10)  ondansetron Injectable 4 milliGRAM(s) IV Push every 6 hours PRN Nausea and/or Vomiting        LABS: All Labs Reviewed:                                                  7.5    9.09  )-----------( 225      ( 21 Jul 2018 06:08 )             24.5   07-21    139  |  107  |  24<H>  ----------------------------<  101<H>  3.2<L>   |  28  |  3.31<H>    Ca    7.5<L>      21 Jul 2018 06:08  Phos  4.1     07-20  Mg     1.9     07-21    TPro  x   /  Alb  2.1<L>  /  TBili  x   /  DBili  x   /  AST  x   /  ALT  x   /  AlkPhos  x   07-20      Blood Culture:   Culture - Urine (07.10.18 @ 16:30)    Specimen Source: .Urine Catheterized    Culture Results:   <10,000 CFU/ml  Normal Urogenital ravinder present    Culture - Blood (07.06.18 @ 13:42)    Specimen Source: .Blood Blood-Peripheral    Culture Results:   No growth at 5 days.      RADIOLOGY/EKG:  < from: Xray Chest 1 View- PORTABLE-Urgent (07.16.18 @ 18:34) >  IMPRESSION:    Findings suggesting persistent mild CHF with mild pulmonary vascular   congestion and trace fluid in the right minor fissure.    Stable right CVC.    < end of copied text >    < from: Transthoracic Echocardiogram (07.10.18 @ 10:21) >   Impression     Summary     Fibrocalcific changes noted to the mitral valve leaflets with preserved   leaflet excursion.   Moderate (2+) mitral regurgitation is present.   The left atrium is moderately dilated.   Mild concentric left ventricular hypertrophy is present.   Estimated left ventricular ejection fraction is 55-60 %.    < end of copied text >    < from: CT Abdomen and Pelvis No Cont (07.21.18 @ 11:15) >  IMPRESSION:     Stable appearance of pancolitis consistent with pseudomembranous colitis.   No pneumatosis or free air.    Increasing moderate ascites.    < end of copied text >      DVT PPX: NO AC due to severe GI bleed in past

## 2018-07-21 NOTE — PROGRESS NOTE ADULT - SUBJECTIVE AND OBJECTIVE BOX
Pt has been seen and examined with FP resident, resident supervised agree with a/p       Patient is a 83y old  Male who presents with a chief complaint of Fever/Diarrhea (20 Jul 2018 13:30)        PHYSICAL EXAM:    general- comfortable, chronically ill appearing    -rs-aeeb,cta  -cvs-s1s2 normal   -p/a-soft,bs+  -extremity- no asymmetrical swelling noted   -cns- non focal         A/P    #Desaturation and acute hypoxic respiratory failure- most likely due to ascitis due to  hypoproeinemia   -IR consult for drainage -therapeutic and diagnostic   -removal of maximum fluid during hd     #Anasarca     #hold off on discharge

## 2018-07-21 NOTE — PROGRESS NOTE ADULT - SUBJECTIVE AND OBJECTIVE BOX
Patient is a 83y old  Male who presents with a chief complaint of complain of diarrhea, fever (06 Jul 2018 23:55)      HPI:  Pt is a 84 y/o M w/pmhx of Afib, CHF, CAD s/p stents, COPD, PNA, upper GI bleed (duodenal)  BIB EMS from [facility] assisted living for fever, abd pain, and diarrhea that began 3 weeks ago. Was in the hospital for PNA tx and was later dx with C-diff and started on a course of PO vancomycin for 10 days for the C-diff. States that he has had diarrhea since before the course of abx. Pain has been increased for the past few weeks and is characterized as a cramping feeling. Daughters also note that there is increased distension. Also notes chest pain characterized as a cramping in the central chest. 83% O2 sat noted this morning at the assisted living facility. Takes O2 routinely at night but not during the day. (06 Jul 2018 23:55)  Patient's daughter states he was treated for ESBL with antibiotics prior to this  7/10  seen and examined with his dter at bedside  hx of resp failure and intubation 2012 x 3-4 days  feels ok with breathing now  being evaluated for surgical interventions with pancolitis  possibility of post op need for vent support discussed witrh pt and his dtr  he wants to proceed understanding high risk for surgery due to compromised medical condition  hx SHAYY and treated with BIPAP in the past / stopped using it after wt loss  7/11  seen in ICU with dtr at bedside  data discussed with critical care RN  s/p Ross and hemodialysis  still with abd distention  family wants to wait on abd surgery given he is high risk  7/12  family at bedside updated  no cp  no difficulty breathing  intermittent wheezing  7/13  NGT is discontinued  dtr at bedside updated  no issue with breathing now  7/14  having HD  some po intake tolerated ok  dtr at bedside updated  no active pulm issues  7/15  resting comfortably  no distress  Gi and renal eval noted  7/16  feels better  having dialysis  awake and alert  po intake tolerated well  7/17  dtr at bedside  s/lauren tate placed right chest  no cp    7/21  I was asked by Dr Tati Tomas to re eval for reported episodes of desaturations  DTR at bedside  data discussed at length  pt's reported hypoxemic episodes after narcotics / while sleeping or having abd distention  he has known hx SHAYY / OHS and prior BIPAP 12/8 with O2 attached at home  this am after 35% VM was removed in his room, RA O2 sat 88%-93% range  no distress  no significant cough  occasional wheeze  PAST MEDICAL & SURGICAL HISTORY:  Diastolic CHF  Peripheral vascular disease  Afib  Anemia  CKD (chronic kidney disease)  COPD (chronic obstructive pulmonary disease)  SHAYY (obstructive sleep apnea)  Sepsis, due to unspecified organism: 2/2 poorly healing wounds b/l  Dyspepsia: On moderate exertion.  Sleep apnea, obstructive: Requires home 02 therapy, and treatment with BIPAP  Atelectasis  Pleural effusion, bilateral  Respiratory failure  Peripheral edema  CRI (chronic renal insufficiency)  Gout  Benign prostatic hypertrophy  Spinal stenosis  Hypercholesterolemia  GERD (gastroesophageal reflux disease)  CAD (coronary artery disease)  Hypertension  S/P angioplasty with stent  Cataract of left eye  Prostate: Surgery green light procedure.  S/P rotator cuff surgery: Right  S/P angioplasty    MEDICATIONS  (STANDING):  allopurinol 100 milliGRAM(s) Oral daily  aspirin  chewable 81 milliGRAM(s) Oral daily  buDESOnide   0.5 milliGRAM(s) Respule 0.5 milliGRAM(s) Inhalation two times a day  cholestyramine Powder (Sugar-Free) 4 Gram(s) Oral two times a day  diltiazem    Tablet 30 milliGRAM(s) Oral every 6 hours  doxazosin 8 milliGRAM(s) Oral at bedtime  epoetin nelly Injectable 3000 Unit(s) IV Push <User Schedule>  epoetin nelly Injectable 4000 Unit(s) IV Push <User Schedule>  finasteride 5 milliGRAM(s) Oral daily  heparin  Injectable 5000 Unit(s) SubCutaneous every 12 hours  potassium chloride    Tablet ER 40 milliEquivalent(s) Oral every 4 hours  potassium chloride  10 mEq/100 mL IVPB 10 milliEquivalent(s) IV Intermittent every 1 hour  simvastatin 10 milliGRAM(s) Oral at bedtime  sodium ferric gluconate complex Injectable 125 milliGRAM(s) IV Push <User Schedule>  vancomycin    Solution 500 milliGRAM(s) Oral every 6 hours    s            MEDICATIONS  (PRN):  acetaminophen   Tablet 650 milliGRAM(s) Oral every 8 hours PRN For Temp greater than 38 C (100.4 F)  acetaminophen   Tablet. 650 milliGRAM(s) Oral every 8 hours PRN Mild Pain (1 - 3)  ALBUTerol   0.5% 2.5 milliGRAM(s) Nebulizer every 4 hours PRN Shortness of Breath and/or Wheezing  morphine  - Injectable 2 milliGRAM(s) IV Push every 6 hours PRN Severe Pain (7 - 10)  ondansetron Injectable 4 milliGRAM(s) IV Push every 6 hours PRN Nausea and/or Vomiting      FAMILY HISTORY:  No pertinent family history in first degree relatives    REVIEW OF SYSTEM:  abdominal pain, otherwise 12 point ROS negative     Vital Signs Last 24 Hrs  T(C): 37.2 (21 Jul 2018 05:00), Max: 37.2 (20 Jul 2018 22:00)  T(F): 98.9 (21 Jul 2018 05:00), Max: 98.9 (20 Jul 2018 22:00)  HR: 72 (21 Jul 2018 07:00) (67 - 112)  BP: 121/33 (21 Jul 2018 07:00) (99/38 - 154/34)  BP(mean): 54 (21 Jul 2018 07:00) (50 - 66)  RR: 17 (21 Jul 2018 07:00) (12 - 22)  SpO2: 98% (21 Jul 2018 07:00) (85% - 100%))  PHYSICAL EXAM  General Appearance: cooperative, no acute distress,   HEENT: PERRL, conjunctiva clear, EOM's intact, non injected pharynx, no exudate, TM   normal  Neck: Supple, , no adenopathy, thyroid: not enlarged, no carotid bruit or JVD  Back: Symmetric, no  tenderness,no soft tissue tenderness  Lungs:IMPROVED Diminished bilaterally R>L with some dullness to percussion  Heart: Regular rate and rhythm, S1, S2 normal, no murmur, rub or gallop  Abdomen:  decreased-tender,no active bowel sounds, Distended , no hepatosplenomegaly  Extremities: pos peripheral edema / Hx Right leg amputation  Skin: Skin color, texture normal, no rashes   Neurologic: Alert and oriented X3 , cranial nerves intact, sensory and motor normal,    ECG:    LABS:                          7.5    9.09  )-----------( 225      ( 21 Jul 2018 06:08 )             24.5   07-21    139  |  107  |  24<H>  ----------------------------<  101<H>  3.2<L>   |  28  |  3.31<H>    Ca    7.5<L>      21 Jul 2018 06:08  Phos  4.1     07-20  Mg     1.9     07-21    TPro  x   /  Alb  2.1<L>  /  TBili  x   /  DBili  x   /  AST  x   /  ALT  x   /  AlkPhos  x   07-20                                   8.4    10.13 )-----------( 199      ( 17 Jul 2018 05:41 )             26.8   07-17    142  |  108  |  19  ----------------------------<  89  3.4<L>   |  25  |  3.02<H>    Ca    7.2<L>      17 Jul 2018 05:41                 8.4    8.91  )-----------( 181      ( 15 Jul 2018 06:16 )             26.4   07-15    142  |  107  |  23  ----------------------------<  101<H>  3.3<L>   |  26  |  3.04<H>    Ca    7.5<L>      15 Jul 2018 06:16                            8.2    10.15 )-----------( 183      ( 13 Jul 2018 05:14 )             26.1   07-13    143  |  107  |  26<H>  ----------------------------<  89  3.5   |  26  |  2.62<H>    Ca    7.2<L>      13 Jul 2018 05:14  Phos  4.3     07-12  Mg     2.0     07-12    TPro  x   /  Alb  2.0<L>  /  TBili  x   /  DBili  x   /  AST  x   /  ALT  x   /  AlkPhos  x   07-12                            8.3    10.62 )-----------( 202      ( 12 Jul 2018 05:17 )             26.8   07-12    141  |  107  |  33<H>  ----------------------------<  66<L>  3.9   |  23  |  2.84<H>    Ca    7.4<L>      12 Jul 2018 05:17  Phos  4.3     07-12  Mg     2.0     07-12    TPro  x   /  Alb  2.0<L>  /  TBili  x   /  DBili  x   /  AST  x   /  ALT  x   /  AlkPhos  x   07-12                          8.8    20.36 )-----------( 265      ( 10 Jul 2018 05:47 )             28.0   07-11    142  |  108  |  52<H>  ----------------------------<  80  4.0   |  23  |  3.82<H>    Ca    7.0<L>      11 Jul 2018 06:20  Phos  4.9     07-11  Mg     1.9     07-11                              8.1    20.78 )-----------( 239      ( 09 Jul 2018 05:48 )             25.6     07-09    144  |  114<H>  |  65<H>  ----------------------------<  113<H>  4.5   |  17<L>  |  3.90<H>    Ca    7.1<L>      09 Jul 2018 05:48  Mg     1.9     07-09      CARDIAC MARKERS ( 07 Jul 2018 17:51 )  0.024 ng/mL / x     / x     / x     / x                  ABG - ( 08 Jul 2018 15:47 )  pH, Arterial: 7.18  pH, Blood: x     /  pCO2: 39    /  pO2: 68    / HCO3: 14    / Base Excess: -13.1 /  SaO2: 92            < from: Xray Chest 1 View-PORTABLE IMMEDIATE (07.10.18 @ 15:50) >  INTERPRETATION:  CLINICAL INDICATION:  R  I J dialysis line placement    TECHNIQUE: Single view AP chest radiograph was performed.    COMPARISON:  Chest radiograph performed earlier on the same day,   7/10/2018 at 8:28 AM and chest radiograph dated 7/6/2018.    FINDINGS:    Interval placement of right-sided jugular central venous catheter with   tip projecting over the right atrium. No evidence forpneumothorax.    There is persistent mild pulmonary vascular congestion. There is trace   fluid within the right minor fissure.    The cardiac silhouette size is stable. Diffuse aortic calcifications are   noted    Redemonstration of anchor screw within the right humeral head.     IMPRESSION:    Interval placement of right-sided IJ CVC, with tip projecting over the   right atrium. No pneumothorax.     < end of copied text >          RADIOLOGY & ADDITIONAL STUDIES:  IMPRESSION:     Severe pancolitis consistent with pseudomembranous colitis.    Mild pulmonary edema.    Small loculated right and trace layering left pleural effusions.    < from: Xray Chest 1 View- PORTABLE-Routine (07.20.18 @ 15:52) >    PROCEDURE DATE:  07/20/2018          INTERPRETATION:  History: Acute on chronic kidney disease on   hemodialysis. Oxygen desaturation.    Single view of the chest was performed.    Comparison is made to July 16, 2018.    Findings:    There is a right-sided hemodialysis catheter in place tip at cavoatrial   junction. The lungs are clear. There is moderate improvement of   previously noted pulmonary vascular congestion. Heart size within normal   limits. Patient is status post right rotator cuff repair.    Impression:    Improvement of previously noted pulmonary vascular congestion.    < end of copied text >

## 2018-07-21 NOTE — PROGRESS NOTE ADULT - ASSESSMENT
82 y/o wm with hx of ckd stage 3 ( scr 1.5-1.7) with ARYA due to volume depletion from CDiff associated colitis and diarrhea in presence of diuretic use PTA.  Now with non anion gap metabolic acidosis and worsening renal parameters.  CXR with mild CHF with hx of diastolic CHF.    PLAN  - obtain abd and lactate  - will change ivf and add bicarbonate and keep at current rate  - hold lasix done.   - fu uop and scr closely and will monitor respiratory status  - bp and hr stable now, cardizem adjusted and lopressor added    7/9 MK  - ARYA: worsening renal parameters with metabolic acidosis, non ag.  Previous lactate WNL and since placed on bicarbonate drip  fu repeat abg today.  Increase IVF rate  possibility of HD discussed with enio, hcp daughter, pt has been agreeable in past.   DC hydralazine  dc morphine and change to dilaudid  - Cdiff with rising scr and worsening abd distension: CRS consult ongoing  possible repeat ct scan  DW Dr ballesteros    7/10  Anuric  anasarca  Non anion gap acidosis on bicarb drip  ARYA, anuric  CKD 3 history  d/w Family, and pt agreeable  called ICU for line placement and to dialyze the pt in ICU for monitoring    7/10  HD initiated via R temp HD catheter  tolerating well  no complaints  good abf    7/11 SY  --ARYA with Anasarca.  Urine output slightly improved.  However, remains quite fluid overloaded.  Will plan to continue HD until fluid status is much improved.  HD order written.  3 hour tx today.  Will aim to remove 2.5 kg as tolerated.  --C DIff colitis ; continue to monitor closely.    7/12 SY  --ARYA with Anasarca.   Remains Oligo-anuric.    --HD with goal of 2.5 kg UF again today.  --C Diff colitis.   Clinically improved   Continue to monitor.    7/13  The patient was dialyzed 3 days in a row this week  Discussed with the patient's daughter and hospitalist, intensivist  Will skip dialysis today, no urgent need for dialysis  We'll dialyze the patient tomorrow on Saturday and then skip Sunday to dialyze the patient again on Monday.    7/14  We'll dialyze against 4 K bath  Case discussed with the patient's daughter  May need a permanent catheter by Monday      7/15  Dialyze with a 4K bath  Potassium is 3.3 most likely from the diarrhea  Schedule for permacath placement after dialysis on Monday or Tuesday  Patient is comfortable no complications noted at this time    7/16 MK  - ARYA/ CKD stage 3 remains oliguirc and HD dependent.  LImited UF at HD toleated with the   hypokalemia corrected with HD.  Permcath planned today  - Cdiff: on PO vanc.  improved leukocytosis and abd distension     7/17  s/p perm cath  HD tomorrow  4 K bath  replace K   overall stable    7/18 SY  --ARYA/CKD for now remaining dialysis dependent.  Continue TIW HD.  --C Diff colitis ; improving clinically.    7/19 SY  --ARYA/CKD : Continue TIW HD.   Will continue as outpatient for now as no signs of recovery at this point.  --C Diff colitis  : clinically improving.  Continue po abtx.  --D/c Planning  D/w pt's daughter re : rehab with HD.  --Replete K.  Continue regular diet without restrictions for now.    7/20 MK  - ARYA/CKD remains HD dependent with oliguria.  Will attempt UF with HD.  K correction with HD and changed to regular diet with fluid restriction  - Cdiff: PO vanc with improving renal paramenters-  - Episodes of Desat: will have pulmonary re-eval prior to dc.  - DC planning rehab with dialysis  dw Dr Hightower  will see pt    7/21 MK  - ARYA/CKD remains hd dependent. post hd cxr with improved PVC.  Still with episodes of desaturation and dr hightower input noted.  CT with evidence of ascites for paracentesis  - cdiff: on po vanc.  still with loose stools,

## 2018-07-21 NOTE — PROGRESS NOTE ADULT - SUBJECTIVE AND OBJECTIVE BOX
NEPHROLOGY INTERVAL HPI/OVERNIGHT EVENTS:  s/p CT scan for inc abd distension noted with ascites, for paracentesis  dr bush input noted   remains on 35% venti mask    MEDICATIONS  (STANDING):  ALBUTerol/ipratropium for Nebulization. 3 milliLiter(s) Nebulizer once  allopurinol 100 milliGRAM(s) Oral daily  aspirin  chewable 81 milliGRAM(s) Oral daily  buDESOnide   0.5 milliGRAM(s) Respule 0.5 milliGRAM(s) Inhalation two times a day  cholestyramine Powder (Sugar-Free) 4 Gram(s) Oral two times a day  diltiazem    Tablet 30 milliGRAM(s) Oral every 6 hours  doxazosin 8 milliGRAM(s) Oral at bedtime  epoetin nelly Injectable 3000 Unit(s) IV Push <User Schedule>  epoetin nelly Injectable 4000 Unit(s) IV Push <User Schedule>  finasteride 5 milliGRAM(s) Oral daily  heparin  Injectable 5000 Unit(s) SubCutaneous every 12 hours  simvastatin 10 milliGRAM(s) Oral at bedtime  sodium ferric gluconate complex Injectable 125 milliGRAM(s) IV Push <User Schedule>  vancomycin    Solution 500 milliGRAM(s) Oral every 6 hours    MEDICATIONS  (PRN):  acetaminophen   Tablet 650 milliGRAM(s) Oral every 8 hours PRN For Temp greater than 38 C (100.4 F)  acetaminophen   Tablet. 650 milliGRAM(s) Oral every 8 hours PRN Mild Pain (1 - 3)  albumin human 25% IVPB 50 milliLiter(s) IV Intermittent <User Schedule> PRN sbp<=120mmhg  ALBUTerol   0.5% 2.5 milliGRAM(s) Nebulizer every 4 hours PRN Shortness of Breath and/or Wheezing  diphenhydrAMINE   Capsule 25 milliGRAM(s) Oral at bedtime PRN sleep  HYDROmorphone  Injectable 0.5 milliGRAM(s) IV Push every 6 hours PRN Severe Pain (7 - 10)  ondansetron Injectable 4 milliGRAM(s) IV Push every 6 hours PRN Nausea and/or Vomiting      Allergies    Zosyn (Rash)    Intolerances        I&O's Detail    2018 07:01  -  2018 07:00  --------------------------------------------------------  IN:    Oral Fluid: 240 mL  Total IN: 240 mL    OUT:  Total OUT: 0 mL    Total NET: 240 mL      2018 07:01  -  2018 14:03  --------------------------------------------------------  IN:    Oral Fluid: 240 mL  Total IN: 240 mL    OUT:  Total OUT: 0 mL    Total NET: 240 mL            Vital Signs Last 24 Hrs  T(C): 37.2 (2018 10:00), Max: 37.2 (2018 22:00)  T(F): 98.9 (2018 10:00), Max: 98.9 (2018 22:00)  HR: 71 (2018 13:00) (67 - 79)  BP: 136/43 (2018 13:00) (99/38 - 154/34)  BP(mean): 67 (2018 13:00) (52 - 75)  RR: 24 (2018 13:00) (12 - 24)  SpO2: 98% (2018 13:00) (85% - 100%)  Daily     Daily Weight in k.8 (2018 06:00)    PHYSICAL EXAM:  General: alert. awake Ox3  HEENT: MMM  CV: s1s2 rrr  LUNGS: B/L CTA  EXT: no edema    LABS:                        7.5    9.09  )-----------( 225      ( 2018 06:08 )             24.5     07-21    139  |  107  |  24<H>  ----------------------------<  101<H>  3.2<L>   |  28  |  3.31<H>    Ca    7.5<L>      2018 06:08  Phos  4.1     07-20  Mg     1.9     07-    TPro  x   /  Alb  2.1<L>  /  TBili  x   /  DBili  x   /  AST  x   /  ALT  x   /  AlkPhos  x   07-20        Magnesium, Serum: 1.9 mg/dL ( @ 06:08)    EXAM:  CT ABDOMEN AND PELVIS                            PROCEDURE DATE:  2018          INTERPRETATION:  CT ABDOMEN AND PELVIS    HISTORY:  abdominal distension, pseudomembranous colitis    Technique: CT of the abdomen and pelvis is performed without oral or   intravenous contrast. Axial images are supplemented with coronal and   sagittal reformations. This study was performed using automatic exposure   control (radiation dose reduction software) to obtain a diagnostic image   quality scan with patient dose as low as reasonably achievable.    Contrast:     None    Comparison: CT abdomen and pelvis 2018    Findings:  LIVER: Unchanged small right lobe hypoattenuation.  SPLEEN: Normal.  PANCREAS: Normal.  GALLBLADDER/BILIARY TREE: Nondilated. Gallstones.  ADRENALS: Normal.  KIDNEYS: No hydronephrosis or urinary tract calculi.  LYMPHADENOPATHY/RETROPERITONEUM: No adenopathy.  VASCULATURE: Aortoiliac atherosclerosis is severe without aneurysm. IVC   filter.  BOWEL: Gastric clips again noted at the fundus. No bowel obstruction.   Stable appearance of pancolitis with moderate colonic wall thickening and   pericolonic fluid. No pneumatosis or free air.  PELVIC VISCERA: Diffuse urinary bladder wall thickening and perivesical   stranding. Sedimentation level in the bladder lumen.  PELVIC LYMPH NODES: No pelvic adenopathy.  PERITONEUM/ABDOMINAL WALL: No free air. Increasing moderate ascites.   Diffuse body wall edema.  SKELETAL: No acute bony abnormality.  LUNG BASES: Small bilateral pleural effusions. Loculated fluid versus   mucoid impaction at the right lung base.    IMPRESSION:     Stable appearance of pancolitis consistent with pseudomembranous colitis.   No pneumatosis or free air.    Increasing moderate ascites.    Anasarca.    Urinary bladder cystitis. Correlate with UA.      KENDRAGRANT ERAZO   This document has been electronically signed. 2018 12:10PM

## 2018-07-22 LAB
ALBUMIN SERPL ELPH-MCNC: 2.1 G/DL — LOW (ref 3.3–5)
ALP SERPL-CCNC: 48 U/L — SIGNIFICANT CHANGE UP (ref 40–120)
ALT FLD-CCNC: <6 U/L — LOW (ref 12–78)
ANION GAP SERPL CALC-SCNC: 8 MMOL/L — SIGNIFICANT CHANGE UP (ref 5–17)
APTT BLD: 30.1 SEC — SIGNIFICANT CHANGE UP (ref 27.5–37.4)
AST SERPL-CCNC: 7 U/L — LOW (ref 15–37)
BILIRUB SERPL-MCNC: 0.4 MG/DL — SIGNIFICANT CHANGE UP (ref 0.2–1.2)
BUN SERPL-MCNC: 33 MG/DL — HIGH (ref 7–23)
CALCIUM SERPL-MCNC: 7.3 MG/DL — LOW (ref 8.5–10.1)
CHLORIDE SERPL-SCNC: 111 MMOL/L — HIGH (ref 96–108)
CO2 SERPL-SCNC: 25 MMOL/L — SIGNIFICANT CHANGE UP (ref 22–31)
CREAT SERPL-MCNC: 4.1 MG/DL — HIGH (ref 0.5–1.3)
GLUCOSE SERPL-MCNC: 104 MG/DL — HIGH (ref 70–99)
HCT VFR BLD CALC: 26.7 % — LOW (ref 39–50)
HGB BLD-MCNC: 8 G/DL — LOW (ref 13–17)
INR BLD: 1.13 RATIO — SIGNIFICANT CHANGE UP (ref 0.88–1.16)
MAGNESIUM SERPL-MCNC: 1.8 MG/DL — SIGNIFICANT CHANGE UP (ref 1.6–2.6)
MCHC RBC-ENTMCNC: 28.5 PG — SIGNIFICANT CHANGE UP (ref 27–34)
MCHC RBC-ENTMCNC: 30 GM/DL — LOW (ref 32–36)
MCV RBC AUTO: 95 FL — SIGNIFICANT CHANGE UP (ref 80–100)
NRBC # BLD: 0 /100 WBCS — SIGNIFICANT CHANGE UP (ref 0–0)
PLATELET # BLD AUTO: 247 K/UL — SIGNIFICANT CHANGE UP (ref 150–400)
POTASSIUM SERPL-MCNC: 4.1 MMOL/L — SIGNIFICANT CHANGE UP (ref 3.5–5.3)
POTASSIUM SERPL-SCNC: 4.1 MMOL/L — SIGNIFICANT CHANGE UP (ref 3.5–5.3)
PROT SERPL-MCNC: 4.4 GM/DL — LOW (ref 6–8.3)
PROTHROM AB SERPL-ACNC: 12.2 SEC — SIGNIFICANT CHANGE UP (ref 9.8–12.7)
RBC # BLD: 2.81 M/UL — LOW (ref 4.2–5.8)
RBC # FLD: 20.9 % — HIGH (ref 10.3–14.5)
SODIUM SERPL-SCNC: 144 MMOL/L — SIGNIFICANT CHANGE UP (ref 135–145)
WBC # BLD: 6.95 K/UL — SIGNIFICANT CHANGE UP (ref 3.8–10.5)
WBC # FLD AUTO: 6.95 K/UL — SIGNIFICANT CHANGE UP (ref 3.8–10.5)

## 2018-07-22 RX ORDER — METOCLOPRAMIDE HCL 10 MG
10 TABLET ORAL ONCE
Qty: 0 | Refills: 0 | Status: COMPLETED | OUTPATIENT
Start: 2018-07-22 | End: 2018-07-22

## 2018-07-22 RX ORDER — PANTOPRAZOLE SODIUM 20 MG/1
40 TABLET, DELAYED RELEASE ORAL EVERY 12 HOURS
Qty: 0 | Refills: 0 | Status: DISCONTINUED | OUTPATIENT
Start: 2018-07-22 | End: 2018-07-25

## 2018-07-22 RX ORDER — METOCLOPRAMIDE HCL 10 MG
10 TABLET ORAL EVERY 8 HOURS
Qty: 0 | Refills: 0 | Status: DISCONTINUED | OUTPATIENT
Start: 2018-07-22 | End: 2018-07-23

## 2018-07-22 RX ADMIN — HEPARIN SODIUM 5000 UNIT(S): 5000 INJECTION INTRAVENOUS; SUBCUTANEOUS at 05:12

## 2018-07-22 RX ADMIN — CHOLESTYRAMINE 4 GRAM(S): 4 POWDER, FOR SUSPENSION ORAL at 21:25

## 2018-07-22 RX ADMIN — Medication 81 MILLIGRAM(S): at 11:34

## 2018-07-22 RX ADMIN — Medication 0.5 MILLIGRAM(S): at 10:23

## 2018-07-22 RX ADMIN — CHOLESTYRAMINE 4 GRAM(S): 4 POWDER, FOR SUSPENSION ORAL at 10:10

## 2018-07-22 RX ADMIN — HEPARIN SODIUM 5000 UNIT(S): 5000 INJECTION INTRAVENOUS; SUBCUTANEOUS at 17:05

## 2018-07-22 RX ADMIN — SIMVASTATIN 10 MILLIGRAM(S): 20 TABLET, FILM COATED ORAL at 23:27

## 2018-07-22 RX ADMIN — Medication 8 MILLIGRAM(S): at 23:27

## 2018-07-22 RX ADMIN — FINASTERIDE 5 MILLIGRAM(S): 5 TABLET, FILM COATED ORAL at 11:33

## 2018-07-22 RX ADMIN — Medication 100 MILLIGRAM(S): at 11:34

## 2018-07-22 RX ADMIN — Medication 500 MILLIGRAM(S): at 23:27

## 2018-07-22 RX ADMIN — PANTOPRAZOLE SODIUM 40 MILLIGRAM(S): 20 TABLET, DELAYED RELEASE ORAL at 10:10

## 2018-07-22 RX ADMIN — ONDANSETRON 4 MILLIGRAM(S): 8 TABLET, FILM COATED ORAL at 11:34

## 2018-07-22 RX ADMIN — Medication 500 MILLIGRAM(S): at 17:06

## 2018-07-22 RX ADMIN — Medication 500 MILLIGRAM(S): at 05:13

## 2018-07-22 RX ADMIN — Medication 500 MILLIGRAM(S): at 11:33

## 2018-07-22 RX ADMIN — Medication 10 MILLIGRAM(S): at 02:20

## 2018-07-22 RX ADMIN — PANTOPRAZOLE SODIUM 40 MILLIGRAM(S): 20 TABLET, DELAYED RELEASE ORAL at 17:05

## 2018-07-22 RX ADMIN — Medication 500 MILLIGRAM(S): at 00:30

## 2018-07-22 NOTE — PROVIDER CONTACT NOTE (CHANGE IN STATUS NOTIFICATION) - SITUATION
Pt c/o nausea and severe acid reflux despite zofran IV prn dose. Describes nausea/reflux worse when repositioning. VS stable. Abd very distended, tender. Denies pain. Pt vomited clear fluid once.

## 2018-07-22 NOTE — PROGRESS NOTE ADULT - SUBJECTIVE AND OBJECTIVE BOX
Patient is a 83y old  Male who presents with a chief complaint of complain of diarrhea, fever (06 Jul 2018 23:55)      HPI:  Pt is a 82 y/o M w/pmhx of Afib, CHF, CAD s/p stents, COPD, PNA, upper GI bleed (duodenal)  BIB EMS from [facility] assisted living for fever, abd pain, and diarrhea that began 3 weeks ago. Was in the hospital for PNA tx and was later dx with C-diff and started on a course of PO vancomycin for 10 days for the C-diff. States that he has had diarrhea since before the course of abx. Pain has been increased for the past few weeks and is characterized as a cramping feeling. Daughters also note that there is increased distension. Also notes chest pain characterized as a cramping in the central chest. 83% O2 sat noted this morning at the assisted living facility. Takes O2 routinely at night but not during the day. (06 Jul 2018 23:55)  Patient's daughter states he was treated for ESBL with antibiotics prior to this  7/10  seen and examined with his dter at bedside  hx of resp failure and intubation 2012 x 3-4 days  feels ok with breathing now  being evaluated for surgical interventions with pancolitis  possibility of post op need for vent support discussed witrh pt and his dtr  he wants to proceed understanding high risk for surgery due to compromised medical condition  hx SHAYY and treated with BIPAP in the past / stopped using it after wt loss  7/11  seen in ICU with dtr at bedside  data discussed with critical care RN  s/p Ross and hemodialysis  still with abd distention  family wants to wait on abd surgery given he is high risk  7/12  family at bedside updated  no cp  no difficulty breathing  intermittent wheezing  7/13  NGT is discontinued  dtr at bedside updated  no issue with breathing now  7/14  having HD  some po intake tolerated ok  dtr at bedside updated  no active pulm issues  7/15  resting comfortably  no distress  Gi and renal eval noted  7/16  feels better  having dialysis  awake and alert  po intake tolerated well  7/17  dtr at bedside  s/lauren tate placed right chest  no cp    7/21  I was asked by Dr Tati Tomas to re eval for reported episodes of desaturations  DTR at bedside  data discussed at length  pt's reported hypoxemic episodes after narcotics / while sleeping or having abd distention  he has known hx SHAYY / OHS and prior BIPAP 12/8 with O2 attached at home  this am after 35% VM was removed in his room, RA O2 sat 88%-93% range  no distress  no significant cough  occasional wheeze    7/22  feels the same  family updated at bedside  reported plan for paracentesis in am  vomited once  PAST MEDICAL & SURGICAL HISTORY:  Diastolic CHF  Peripheral vascular disease  Afib  Anemia  CKD (chronic kidney disease)  COPD (chronic obstructive pulmonary disease)  SHAYY (obstructive sleep apnea)  Sepsis, due to unspecified organism: 2/2 poorly healing wounds b/l  Dyspepsia: On moderate exertion.  Sleep apnea, obstructive: Requires home 02 therapy, and treatment with BIPAP  Atelectasis  Pleural effusion, bilateral  Respiratory failure  Peripheral edema  CRI (chronic renal insufficiency)  Gout  Benign prostatic hypertrophy  Spinal stenosis  Hypercholesterolemia  GERD (gastroesophageal reflux disease)  CAD (coronary artery disease)  Hypertension  S/P angioplasty with stent  Cataract of left eye  Prostate: Surgery green light procedure.  S/P rotator cuff surgery: Right  S/P angioplasty    MEDICATIONS  (STANDING):  ALBUTerol/ipratropium for Nebulization. 3 milliLiter(s) Nebulizer once  allopurinol 100 milliGRAM(s) Oral daily  aspirin  chewable 81 milliGRAM(s) Oral daily  buDESOnide   0.5 milliGRAM(s) Respule 0.5 milliGRAM(s) Inhalation two times a day  cholestyramine Powder (Sugar-Free) 4 Gram(s) Oral two times a day  diltiazem    Tablet 30 milliGRAM(s) Oral every 6 hours  doxazosin 8 milliGRAM(s) Oral at bedtime  epoetin nelly Injectable 3000 Unit(s) IV Push <User Schedule>  epoetin nelly Injectable 4000 Unit(s) IV Push <User Schedule>  finasteride 5 milliGRAM(s) Oral daily  heparin  Injectable 5000 Unit(s) SubCutaneous every 12 hours  pantoprazole  Injectable 40 milliGRAM(s) IV Push every 12 hours  simvastatin 10 milliGRAM(s) Oral at bedtime  sodium ferric gluconate complex Injectable 125 milliGRAM(s) IV Push <User Schedule>  vancomycin    Solution 500 milliGRAM(s) Oral every 6 hours        MEDICATIONS  (PRN):  acetaminophen   Tablet 650 milliGRAM(s) Oral every 8 hours PRN For Temp greater than 38 C (100.4 F)  acetaminophen   Tablet. 650 milliGRAM(s) Oral every 8 hours PRN Mild Pain (1 - 3)  ALBUTerol   0.5% 2.5 milliGRAM(s) Nebulizer every 4 hours PRN Shortness of Breath and/or Wheezing  morphine  - Injectable 2 milliGRAM(s) IV Push every 6 hours PRN Severe Pain (7 - 10)  ondansetron Injectable 4 milliGRAM(s) IV Push every 6 hours PRN Nausea and/or Vomiting      FAMILY HISTORY:  No pertinent family history in first degree relatives    REVIEW OF SYSTEM:  abdominal pain, otherwise 12 point ROS negative     Vital Signs Last 24 Hrs  T(C): 36.6 (22 Jul 2018 06:00), Max: 36.6 (21 Jul 2018 14:00)  T(F): 97.8 (22 Jul 2018 06:00), Max: 97.9 (22 Jul 2018 02:00)  HR: 68 (22 Jul 2018 11:00) (60 - 80)  BP: 139/42 (22 Jul 2018 11:00) (110/27 - 143/39)  BP(mean): 67 (22 Jul 2018 11:00) (48 - 75)  RR: 23 (22 Jul 2018 06:00) (16 - 24)  SpO2: 98% (22 Jul 2018 11:00) (93% - 100%)  PHYSICAL EXAM  General Appearance: cooperative, no acute distress,   HEENT: PERRL, conjunctiva clear, EOM's intact, non injected pharynx, no exudate, TM   normal  Neck: Supple, , no adenopathy, thyroid: not enlarged, no carotid bruit or JVD  Back: Symmetric, no  tenderness,no soft tissue tenderness  Lungs:IMPROVED Diminished bilaterally R>L with some dullness to percussion  Heart: Regular rate and rhythm, S1, S2 normal, no murmur, rub or gallop  Abdomen:  decreased-tender,no active bowel sounds, Distended , no hepatosplenomegaly  Extremities: pos peripheral edema / Hx Right leg amputation  Skin: Skin color, texture normal, no rashes   Neurologic: Alert and oriented X3 , cranial nerves intact, sensory and motor normal,    ECG:    LABS:                                   8.0    6.95  )-----------( 247      ( 22 Jul 2018 06:19 )             26.7   07-22    144  |  111<H>  |  33<H>  ----------------------------<  104<H>  4.1   |  25  |  4.10<H>    Ca    7.3<L>      22 Jul 2018 06:19  Mg     1.8     07-22    TPro  4.4<L>  /  Alb  2.1<L>  /  TBili  0.4  /  DBili  x   /  AST  7<L>  /  ALT  <6<L>  /  AlkPhos  48  07-22               7.5    9.09  )-----------( 225      ( 21 Jul 2018 06:08 )             24.5   07-21    139  |  107  |  24<H>  ----------------------------<  101<H>  3.2<L>   |  28  |  3.31<H>    Ca    7.5<L>      21 Jul 2018 06:08  Phos  4.1     07-20  Mg     1.9     07-21    TPro  x   /  Alb  2.1<L>  /  TBili  x   /  DBili  x   /  AST  x   /  ALT  x   /  AlkPhos  x   07-20                                   8.4    10.13 )-----------( 199      ( 17 Jul 2018 05:41 )             26.8   07-17    142  |  108  |  19  ----------------------------<  89  3.4<L>   |  25  |  3.02<H>    Ca    7.2<L>      17 Jul 2018 05:41                 8.4    8.91  )-----------( 181      ( 15 Jul 2018 06:16 )             26.4   07-15    142  |  107  |  23  ----------------------------<  101<H>  3.3<L>   |  26  |  3.04<H>    Ca    7.5<L>      15 Jul 2018 06:16                            8.2    10.15 )-----------( 183      ( 13 Jul 2018 05:14 )             26.1   07-13    143  |  107  |  26<H>  ----------------------------<  89  3.5   |  26  |  2.62<H>    Ca    7.2<L>      13 Jul 2018 05:14  Phos  4.3     07-12  Mg     2.0     07-12    TPro  x   /  Alb  2.0<L>  /  TBili  x   /  DBili  x   /  AST  x   /  ALT  x   /  AlkPhos  x   07-12                            8.3    10.62 )-----------( 202      ( 12 Jul 2018 05:17 )             26.8   07-12    141  |  107  |  33<H>  ----------------------------<  66<L>  3.9   |  23  |  2.84<H>    Ca    7.4<L>      12 Jul 2018 05:17  Phos  4.3     07-12  Mg     2.0     07-12    TPro  x   /  Alb  2.0<L>  /  TBili  x   /  DBili  x   /  AST  x   /  ALT  x   /  AlkPhos  x   07-12                          8.8    20.36 )-----------( 265      ( 10 Jul 2018 05:47 )             28.0   07-11    142  |  108  |  52<H>  ----------------------------<  80  4.0   |  23  |  3.82<H>    Ca    7.0<L>      11 Jul 2018 06:20  Phos  4.9     07-11  Mg     1.9     07-11                              8.1    20.78 )-----------( 239      ( 09 Jul 2018 05:48 )             25.6     07-09    144  |  114<H>  |  65<H>  ----------------------------<  113<H>  4.5   |  17<L>  |  3.90<H>    Ca    7.1<L>      09 Jul 2018 05:48  Mg     1.9     07-09      CARDIAC MARKERS ( 07 Jul 2018 17:51 )  0.024 ng/mL / x     / x     / x     / x                  ABG - ( 08 Jul 2018 15:47 )  pH, Arterial: 7.18  pH, Blood: x     /  pCO2: 39    /  pO2: 68    / HCO3: 14    / Base Excess: -13.1 /  SaO2: 92            < from: Xray Chest 1 View-PORTABLE IMMEDIATE (07.10.18 @ 15:50) >  INTERPRETATION:  CLINICAL INDICATION:  R  I J dialysis line placement    TECHNIQUE: Single view AP chest radiograph was performed.    COMPARISON:  Chest radiograph performed earlier on the same day,   7/10/2018 at 8:28 AM and chest radiograph dated 7/6/2018.    FINDINGS:    Interval placement of right-sided jugular central venous catheter with   tip projecting over the right atrium. No evidence forpneumothorax.    There is persistent mild pulmonary vascular congestion. There is trace   fluid within the right minor fissure.    The cardiac silhouette size is stable. Diffuse aortic calcifications are   noted    Redemonstration of anchor screw within the right humeral head.     IMPRESSION:    Interval placement of right-sided IJ CVC, with tip projecting over the   right atrium. No pneumothorax.     < end of copied text >          RADIOLOGY & ADDITIONAL STUDIES:  IMPRESSION:     Severe pancolitis consistent with pseudomembranous colitis.    Mild pulmonary edema.    Small loculated right and trace layering left pleural effusions.    < from: Xray Chest 1 View- PORTABLE-Routine (07.20.18 @ 15:52) >    PROCEDURE DATE:  07/20/2018          INTERPRETATION:  History: Acute on chronic kidney disease on   hemodialysis. Oxygen desaturation.    Single view of the chest was performed.    Comparison is made to July 16, 2018.    Findings:    There is a right-sided hemodialysis catheter in place tip at cavoatrial   junction. The lungs are clear. There is moderate improvement of   previously noted pulmonary vascular congestion. Heart size within normal   limits. Patient is status post right rotator cuff repair.    Impression:    Improvement of previously noted pulmonary vascular congestion.    < end of copied text >  LUNG BASES: Small bilateral pleural effusions. Loculated fluid versus   mucoid impaction at the right lung base.    IMPRESSION:     Stable appearance of pancolitis consistent with pseudomembranous colitis.   No pneumatosis or free air.    Increasing moderate ascites.    Anasarca.    Urinary bladder cystitis. Correlate with UA.

## 2018-07-22 NOTE — PROGRESS NOTE ADULT - PROBLEM SELECTOR PLAN 2
- continue to maintain fluid balance with HD TIW  - control HFpEF with medical management - Toprol 25mg BID  - O2 as needed  - Pulm appreciated, add nebs  - Incentive Spirometry  - CXR shows improvement of Pulmonary Vascular Congestion  - CT Abdomen showing Moderate Ascites - contributing to desaturation; Paracentesis for Monday.

## 2018-07-22 NOTE — PROVIDER CONTACT NOTE (CHANGE IN STATUS NOTIFICATION) - ACTION/TREATMENT ORDERED:
IV dose of reglan administered as ordered. Pt repositioned for comfort. Denies nausea, but continues feeling heartburn although now decreasing in severity.

## 2018-07-22 NOTE — PROGRESS NOTE ADULT - SUBJECTIVE AND OBJECTIVE BOX
Pt has been seen and examined with FP resident, resident supervised agree with a/p       Patient is a 83y old  Male who presents with a chief complaint of Fever/Diarrhea (20 Jul 2018 13:30)        PHYSICAL EXAM:    general- comfortable, chronically ill appearing    -[id number]-[id number],cta  -cvs-s1s2 normal   -p/a-appears big, soft, bs+    A/P    #Desaturation and acute hypoxic respiratory failure- most likely due to ascitis due to  hypoproteinemia   -IR consult for drainage -therapeutic and diagnostic -probably tomorrow unless clinically his status changes   -removal of maximum fluid during hd     #Anasarca     #Feeling of nausea - start iv ppi for probable stress gastritis     #CT scan showing cystitis -? infectious or noninfectious- ID opinion, follow up requested

## 2018-07-22 NOTE — PROGRESS NOTE ADULT - ASSESSMENT
82 y/o wm with hx of ckd stage 3 ( scr 1.5-1.7) with ARYA due to volume depletion from CDiff associated colitis and diarrhea in presence of diuretic use PTA.  Now with non anion gap metabolic acidosis and worsening renal parameters.  CXR with mild CHF with hx of diastolic CHF.    PLAN  - obtain abd and lactate  - will change ivf and add bicarbonate and keep at current rate  - hold lasix done.   - fu uop and scr closely and will monitor respiratory status  - bp and hr stable now, cardizem adjusted and lopressor added    7/9 MK  - ARYA: worsening renal parameters with metabolic acidosis, non ag.  Previous lactate WNL and since placed on bicarbonate drip  fu repeat abg today.  Increase IVF rate  possibility of HD discussed with enio, hcp daughter, pt has been agreeable in past.   DC hydralazine  dc morphine and change to dilaudid  - Cdiff with rising scr and worsening abd distension: CRS consult ongoing  possible repeat ct scan  DW Dr ballesteros    7/10  Anuric  anasarca  Non anion gap acidosis on bicarb drip  ARYA, anuric  CKD 3 history  d/w Family, and pt agreeable  called ICU for line placement and to dialyze the pt in ICU for monitoring    7/10  HD initiated via R temp HD catheter  tolerating well  no complaints  good abf    7/11 SY  --ARYA with Anasarca.  Urine output slightly improved.  However, remains quite fluid overloaded.  Will plan to continue HD until fluid status is much improved.  HD order written.  3 hour tx today.  Will aim to remove 2.5 kg as tolerated.  --C DIff colitis ; continue to monitor closely.    7/12 SY  --ARYA with Anasarca.   Remains Oligo-anuric.    --HD with goal of 2.5 kg UF again today.  --C Diff colitis.   Clinically improved   Continue to monitor.    7/13  The patient was dialyzed 3 days in a row this week  Discussed with the patient's daughter and hospitalist, intensivist  Will skip dialysis today, no urgent need for dialysis  We'll dialyze the patient tomorrow on Saturday and then skip Sunday to dialyze the patient again on Monday.    7/14  We'll dialyze against 4 K bath  Case discussed with the patient's daughter  May need a permanent catheter by Monday      7/15  Dialyze with a 4K bath  Potassium is 3.3 most likely from the diarrhea  Schedule for permacath placement after dialysis on Monday or Tuesday  Patient is comfortable no complications noted at this time    7/16 MK  - ARYA/ CKD stage 3 remains oliguirc and HD dependent.  LImited UF at HD toleated with the   hypokalemia corrected with HD.  Permcath planned today  - Cdiff: on PO vanc.  improved leukocytosis and abd distension     7/17  s/p perm cath  HD tomorrow  4 K bath  replace K   overall stable    7/18 SY  --ARYA/CKD for now remaining dialysis dependent.  Continue TIW HD.  --C Diff colitis ; improving clinically.    7/19 SY  --ARYA/CKD : Continue TIW HD.   Will continue as outpatient for now as no signs of recovery at this point.  --C Diff colitis  : clinically improving.  Continue po abtx.  --D/c Planning  D/w pt's daughter re : rehab with HD.  --Replete K.  Continue regular diet without restrictions for now.    7/20 MK  - ARYA/CKD remains HD dependent with oliguria.  Will attempt UF with HD.  K correction with HD and changed to regular diet with fluid restriction  - Cdiff: PO vanc with improving renal paramenters-  - Episodes of Desat: will have pulmonary re-eval prior to dc.  - DC planning rehab with dialysis  dw Dr Hightower  will see pt    7/21 MK  - ARYA/CKD remains hd dependent. post hd cxr with improved PVC.  Still with episodes of desaturation and dr hightower input noted.  CT with evidence of ascites for paracentesis  - cdiff: on po vanc.  still with loose stools,     7/22 MK  - ARYA/CKD remains HD dependent.  Will coordinate AM HD based upon timing of paracentesis, hypokalemia improved  - hypoxia with ascites: for paracentesis   - cdif on po vanc, rectal tube in place

## 2018-07-22 NOTE — PROGRESS NOTE ADULT - PROBLEM SELECTOR PLAN 1
- cont Vancomycin 500mg PO q6h  - s/p Flagyl 10 days  - ID appreciated, continue PO ancomycin  - GI appreciated, taper regimen structured  - Colorectal Consult appreciated, would not consider intervention at this time

## 2018-07-22 NOTE — PROGRESS NOTE ADULT - SUBJECTIVE AND OBJECTIVE BOX
82 y/o M w/pmhx of Afib, CHF, CAD s/p stents, COPD, recent PNA on abx, upper GI bleed (duodenal)  BIB EMS from [facility] for fever, abd pain, and diarrhea that began 3 weeks ago. Was in the hospital for PNA tx and was later dx with C-diff and started on a course of PO vancomycin for 10 days for the C-diff. States that he has had diarrhea since before the course of abx. Pain has been increased for the past few weeks and is characterized as a cramping feeling. Daughters also note that there is increased distension. Also notes chest pain characterized as a cramping in the central chest. 83% O2 sat noted morning of admission on 7/6/18, at the assisted living facility. Takes O2 routinely at night but not during the day.    7/22/18 - Patient seen and examined at bedside. Some nausea overnight, hemodynamically stable, afebrile.  Patient has rectal tube in place still having diarrhea.  On Vanco PO.  CT Scan shows Ascites, contributing to his SOB, Paracentesis ordered for Monday Morning.    Vital Signs Last 24 Hrs  T(C): 36.6 (22 Jul 2018 06:00), Max: 36.6 (22 Jul 2018 02:00)  T(F): 97.8 (22 Jul 2018 06:00), Max: 97.9 (22 Jul 2018 02:00)  HR: 69 (22 Jul 2018 14:00) (60 - 80)  BP: 123/26 (22 Jul 2018 14:00) (114/31 - 143/39)  BP(mean): 49 (22 Jul 2018 14:00) (49 - 70)  RR: 18 (22 Jul 2018 14:00) (16 - 24)  SpO2: 100% (22 Jul 2018 14:00) (93% - 100%)    PHYSICAL EXAM:  Constitutional: NAD  HEENT: PERR, EOMI, Normal Hearing, MMM  Neck: Soft and supple, No LAD, No JVD  Respiratory: Breath sounds are clear bilaterally, No wheezing, rales or rhonchi  Cardiovascular: S1 and S2, regular rate and rhythm, no Murmurs, gallops or rubs  Gastrointestinal: Bowel Sounds present, soft, nontender, Distended+, no rigidity, no rebound  Extremities: LLE lesion with dressings c/d/i, RLE s/p AKA +  Vascular: 2+ peripheral pulses  Neurological: A/O x 3, no focal deficits  Musculoskeletal: unable to assess  Skin: Incontinence associated Dermatitis with Rectal tube+ output green/loose stool    MEDICATIONS  (STANDING):  ALBUTerol/ipratropium for Nebulization. 3 milliLiter(s) Nebulizer once  allopurinol 100 milliGRAM(s) Oral daily  aspirin  chewable 81 milliGRAM(s) Oral daily  buDESOnide   0.5 milliGRAM(s) Respule 0.5 milliGRAM(s) Inhalation two times a day  cholestyramine Powder (Sugar-Free) 4 Gram(s) Oral two times a day  diltiazem    Tablet 30 milliGRAM(s) Oral every 6 hours  doxazosin 8 milliGRAM(s) Oral at bedtime  epoetin nelly Injectable 3000 Unit(s) IV Push <User Schedule>  epoetin nelly Injectable 4000 Unit(s) IV Push <User Schedule>  finasteride 5 milliGRAM(s) Oral daily  heparin  Injectable 5000 Unit(s) SubCutaneous every 12 hours  pantoprazole  Injectable 40 milliGRAM(s) IV Push every 12 hours  simvastatin 10 milliGRAM(s) Oral at bedtime  sodium ferric gluconate complex Injectable 125 milliGRAM(s) IV Push <User Schedule>  vancomycin    Solution 500 milliGRAM(s) Oral every 6 hours    MEDICATIONS  (PRN):  acetaminophen   Tablet 650 milliGRAM(s) Oral every 8 hours PRN For Temp greater than 38 C (100.4 F)  acetaminophen   Tablet. 650 milliGRAM(s) Oral every 8 hours PRN Mild Pain (1 - 3)  albumin human 25% IVPB 50 milliLiter(s) IV Intermittent <User Schedule> PRN sbp<=120mmhg  ALBUTerol   0.5% 2.5 milliGRAM(s) Nebulizer every 4 hours PRN Shortness of Breath and/or Wheezing  diphenhydrAMINE   Capsule 25 milliGRAM(s) Oral at bedtime PRN sleep  HYDROmorphone  Injectable 0.5 milliGRAM(s) IV Push every 6 hours PRN Severe Pain (7 - 10)  ondansetron Injectable 4 milliGRAM(s) IV Push every 6 hours PRN Nausea and/or Vomiting    LABS: All Labs Reviewed:                                            8.0    6.95  )-----------( 247      ( 22 Jul 2018 06:19 )             26.7     22 Jul 2018 06:19    144    |  111    |  33     ----------------------------<  104    4.1     |  25     |  4.10     Ca    7.3        22 Jul 2018 06:19  Mg     1.8       22 Jul 2018 06:19    TPro  4.4    /  Alb  2.1    /  TBili  0.4    /  DBili  x      /  AST  7      /  ALT  <6     /  AlkPhos  48     22 Jul 2018 06:19    LIVER FUNCTIONS - ( 22 Jul 2018 06:19 )  Alb: 2.1 g/dL / Pro: 4.4 gm/dL / ALK PHOS: 48 U/L / ALT: <6 U/L / AST: 7 U/L / GGT: x           PT/INR - ( 22 Jul 2018 06:19 )   PT: 12.2 sec;   INR: 1.13 ratio         PTT - ( 22 Jul 2018 06:19 )  PTT:30.1 sec  CAPILLARY BLOOD GLUCOSE      Blood Culture:   Culture - Urine (07.10.18 @ 16:30)    Specimen Source: .Urine Catheterized    Culture Results:   <10,000 CFU/ml  Normal Urogenital ravinder present    Culture - Blood (07.06.18 @ 13:42)    Specimen Source: .Blood Blood-Peripheral    Culture Results:   No growth at 5 days.      RADIOLOGY/EKG:  < from: Xray Chest 1 View- PORTABLE-Urgent (07.16.18 @ 18:34) >  IMPRESSION:    Findings suggesting persistent mild CHF with mild pulmonary vascular   congestion and trace fluid in the right minor fissure.    Stable right CVC.    < end of copied text >    < from: Transthoracic Echocardiogram (07.10.18 @ 10:21) >   Impression     Summary     Fibrocalcific changes noted to the mitral valve leaflets with preserved   leaflet excursion.   Moderate (2+) mitral regurgitation is present.   The left atrium is moderately dilated.   Mild concentric left ventricular hypertrophy is present.   Estimated left ventricular ejection fraction is 55-60 %.    < end of copied text >    < from: CT Abdomen and Pelvis No Cont (07.21.18 @ 11:15) >  IMPRESSION:     Stable appearance of pancolitis consistent with pseudomembranous colitis.   No pneumatosis or free air.    Increasing moderate ascites.    < end of copied text >      DVT PPX: NO AC due to severe GI bleed in past

## 2018-07-22 NOTE — PROGRESS NOTE ADULT - SUBJECTIVE AND OBJECTIVE BOX
NEPHROLOGY INTERVAL HPI/OVERNIGHT EVENTS:  no c/o   night time 02 added   for paracentesis       MEDICATIONS  (STANDING):  ALBUTerol/ipratropium for Nebulization. 3 milliLiter(s) Nebulizer once  allopurinol 100 milliGRAM(s) Oral daily  aspirin  chewable 81 milliGRAM(s) Oral daily  buDESOnide   0.5 milliGRAM(s) Respule 0.5 milliGRAM(s) Inhalation two times a day  cholestyramine Powder (Sugar-Free) 4 Gram(s) Oral two times a day  diltiazem    Tablet 30 milliGRAM(s) Oral every 6 hours  doxazosin 8 milliGRAM(s) Oral at bedtime  epoetin nelly Injectable 3000 Unit(s) IV Push <User Schedule>  epoetin nelly Injectable 4000 Unit(s) IV Push <User Schedule>  finasteride 5 milliGRAM(s) Oral daily  heparin  Injectable 5000 Unit(s) SubCutaneous every 12 hours  pantoprazole  Injectable 40 milliGRAM(s) IV Push every 12 hours  simvastatin 10 milliGRAM(s) Oral at bedtime  sodium ferric gluconate complex Injectable 125 milliGRAM(s) IV Push <User Schedule>  vancomycin    Solution 500 milliGRAM(s) Oral every 6 hours    MEDICATIONS  (PRN):  acetaminophen   Tablet 650 milliGRAM(s) Oral every 8 hours PRN For Temp greater than 38 C (100.4 F)  acetaminophen   Tablet. 650 milliGRAM(s) Oral every 8 hours PRN Mild Pain (1 - 3)  albumin human 25% IVPB 50 milliLiter(s) IV Intermittent <User Schedule> PRN sbp<=120mmhg  ALBUTerol   0.5% 2.5 milliGRAM(s) Nebulizer every 4 hours PRN Shortness of Breath and/or Wheezing  diphenhydrAMINE   Capsule 25 milliGRAM(s) Oral at bedtime PRN sleep  HYDROmorphone  Injectable 0.5 milliGRAM(s) IV Push every 6 hours PRN Severe Pain (7 - 10)  ondansetron Injectable 4 milliGRAM(s) IV Push every 6 hours PRN Nausea and/or Vomiting      Allergies    Zosyn (Rash)    Intolerances        I&O's Detail    21 Jul 2018 07:01  -  22 Jul 2018 07:00  --------------------------------------------------------  IN:    Oral Fluid: 1010 mL  Total IN: 1010 mL    OUT:    Rectal Tube: 400 mL  Total OUT: 400 mL    Total NET: 610 mL        Vital Signs Last 24 Hrs  T(C): 36.6 (22 Jul 2018 06:00), Max: 36.6 (21 Jul 2018 14:00)  T(F): 97.8 (22 Jul 2018 06:00), Max: 97.9 (22 Jul 2018 02:00)  HR: 68 (22 Jul 2018 11:00) (60 - 80)  BP: 139/42 (22 Jul 2018 11:00) (110/27 - 143/39)  BP(mean): 67 (22 Jul 2018 11:00) (48 - 70)  RR: 23 (22 Jul 2018 06:00) (16 - 24)  SpO2: 98% (22 Jul 2018 11:00) (93% - 100%)  Daily     Daily     PHYSICAL EXAM:  General: alert. awake Ox3  HEENT: MMM  CV: s1s2 rrr  LUNGS: B/L CTA  EXT: no edema    LABS:                        8.0    6.95  )-----------( 247      ( 22 Jul 2018 06:19 )             26.7     07-22    144  |  111<H>  |  33<H>  ----------------------------<  104<H>  4.1   |  25  |  4.10<H>    Ca    7.3<L>      22 Jul 2018 06:19  Mg     1.8     07-22    TPro  4.4<L>  /  Alb  2.1<L>  /  TBili  0.4  /  DBili  x   /  AST  7<L>  /  ALT  <6<L>  /  AlkPhos  48  07-22    PT/INR - ( 22 Jul 2018 06:19 )   PT: 12.2 sec;   INR: 1.13 ratio         PTT - ( 22 Jul 2018 06:19 )  PTT:30.1 sec    Magnesium, Serum: 1.8 mg/dL (07-22 @ 06:19)

## 2018-07-22 NOTE — PROVIDER CONTACT NOTE (CHANGE IN STATUS NOTIFICATION) - ASSESSMENT
A&Ox4, denies pain, but complains of acid reflux, nausea, and "spitting up". VS stable on 5L NC. Denies SOB. Liquid stool contained with dignishield. Anuric.

## 2018-07-22 NOTE — PROGRESS NOTE ADULT - ASSESSMENT
1) Clostridium Difficile Colitis clinically improved  2) Metabolic Acidosis  3) Restrictive Ventilatory Disease  4) SHAYY  5) Bilateral Pleural Effusions  6) Renal failure on dilaysis now  7) Leukocytosis is clinically improved  8)s/p shiley placed  9) increased ascites and small pleural effusions  10)Mucoid impaction seen in RLL cont to low recent O2 levels    82 y/o M w/pmhx of Afib, CHF, CAD s/p stents, COPD, PNA, upper GI bleed (duodenal)  BIB EMS from [facility] assisted living for fever, abd pain, and diarrhea that began 3 weeks ago. Was in the hospital for ESBL and was later dx with C-diff and started on a course of PO vancomycin for 10 days for the C-diff. At the present time has diffuse abdominal pain, being treated Vancomycin and IV Metronidazole  ABG reviewed and reveals 7.18/39/68, LA normal limits  Etiology likely from sepsis   repeat ABG noted  ABG - ( 09 Jul 2018 11:50 )  pH, Arterial: 7.21  pH, Blood: x     /  pCO2: 42    /  pO2: 67    / HCO3: 16    / Base Excess: -10.6 /  SaO2: 91      repeat cxr noted  At the present time, patient states he would like to be a full code  Continue monitoring hemodynamics (daughter states baseline diastolic tends to run between 25-40mmHg)   Monitor urine output aggressively   Used BIPAP/CPAP in the past (stopped as an outpatient after patient lost weight), may worsen intraabdominal pressures but if arrhythmias are present, may consider starting again.   Appreciate nephrology/cardiology/ID/GI/hospitalist recommendations  Will continue to monitor   hx of resp failure and intubation 2012 x 3-4 days  s/p Shiley and hemodialysis  still with abd distention but NGT is now discontinued and tolerated po yesterday  overall prognosis remains guarded  improving clinically with decreased leukocytosis  tolerating PO intake, monitor clinically  may need permannt cath placement tomorrow for dialysis  pulm status appears optimized for low risk procedure  fu cxr afterwards needed    7/21/2018  hx SHAYY and treated with BIPAP in the past / stopped using it after wt loss  I was asked by Dr Tati Tomas to re eval for reported episodes of desaturations  DTR at bedside  data discussed at length  pt's reported hypoxemic episodes after narcotics / while sleeping or having abd distention  he has known hx SHAYY / OHS and prior BIPAP 12/8 with O2 attached at home  this am after 35% VM was removed in his room, RA O2 sat 88%-93% range  no distress  no significant cough  occasional wheeze  added nebs  will need supplemental O2 at night 3 liters nc as well as exertional need for O2 supplement as outpt  increased ascites and small pleural effusions  10)Mucoid impaction seen in RLL cont to low recent O2 levels  plan for paracentesis in am  Thank you for the consult

## 2018-07-23 ENCOUNTER — RESULT REVIEW (OUTPATIENT)
Age: 83
End: 2018-07-23

## 2018-07-23 DIAGNOSIS — N30.90 CYSTITIS, UNSPECIFIED WITHOUT HEMATURIA: ICD-10-CM

## 2018-07-23 LAB
ALBUMIN FLD-MCNC: 1.7 G/DL — SIGNIFICANT CHANGE UP
ALBUMIN SERPL ELPH-MCNC: 2.3 G/DL — LOW (ref 3.3–5)
ANION GAP SERPL CALC-SCNC: 9 MMOL/L — SIGNIFICANT CHANGE UP (ref 5–17)
B PERT IGG+IGM PNL SER: CLEAR — SIGNIFICANT CHANGE UP
BUN SERPL-MCNC: 42 MG/DL — HIGH (ref 7–23)
CALCIUM SERPL-MCNC: 7.7 MG/DL — LOW (ref 8.5–10.1)
CHLORIDE SERPL-SCNC: 111 MMOL/L — HIGH (ref 96–108)
CO2 SERPL-SCNC: 24 MMOL/L — SIGNIFICANT CHANGE UP (ref 22–31)
COLOR FLD: YELLOW — SIGNIFICANT CHANGE UP
CREAT SERPL-MCNC: 4.9 MG/DL — HIGH (ref 0.5–1.3)
FLUID INTAKE SUBSTANCE CLASS: SIGNIFICANT CHANGE UP
FLUID SEGMENTED GRANULOCYTES: 54 % — SIGNIFICANT CHANGE UP
GLUCOSE FLD-MCNC: 85 MG/DL — SIGNIFICANT CHANGE UP
GLUCOSE SERPL-MCNC: 79 MG/DL — SIGNIFICANT CHANGE UP (ref 70–99)
GRAM STN FLD: SIGNIFICANT CHANGE UP
HCT VFR BLD CALC: 25.3 % — LOW (ref 39–50)
HGB BLD-MCNC: 7.7 G/DL — LOW (ref 13–17)
LDH SERPL L TO P-CCNC: 160 U/L — SIGNIFICANT CHANGE UP (ref 84–241)
LYMPHOCYTES # FLD: 11 % — SIGNIFICANT CHANGE UP
MAGNESIUM SERPL-MCNC: 2.1 MG/DL — SIGNIFICANT CHANGE UP (ref 1.6–2.6)
MCHC RBC-ENTMCNC: 29.4 PG — SIGNIFICANT CHANGE UP (ref 27–34)
MCHC RBC-ENTMCNC: 30.4 GM/DL — LOW (ref 32–36)
MCV RBC AUTO: 96.6 FL — SIGNIFICANT CHANGE UP (ref 80–100)
MONOS+MACROS # FLD: 35 % — SIGNIFICANT CHANGE UP
NRBC # BLD: 0 /100 WBCS — SIGNIFICANT CHANGE UP (ref 0–0)
PHOSPHATE SERPL-MCNC: 5.4 MG/DL — HIGH (ref 2.5–4.5)
PLATELET # BLD AUTO: 243 K/UL — SIGNIFICANT CHANGE UP (ref 150–400)
POTASSIUM SERPL-MCNC: 3.8 MMOL/L — SIGNIFICANT CHANGE UP (ref 3.5–5.3)
POTASSIUM SERPL-SCNC: 3.8 MMOL/L — SIGNIFICANT CHANGE UP (ref 3.5–5.3)
PROT SERPL-MCNC: 4.7 GM/DL — LOW (ref 6–8.3)
RBC # BLD: 2.62 M/UL — LOW (ref 4.2–5.8)
RBC # FLD: 20.6 % — HIGH (ref 10.3–14.5)
RCV VOL RI: 312 /UL — HIGH (ref 0–5)
SODIUM SERPL-SCNC: 144 MMOL/L — SIGNIFICANT CHANGE UP (ref 135–145)
SPECIMEN SOURCE: SIGNIFICANT CHANGE UP
TOTAL NUCLEATED CELL COUNT, BODY FLUID: 143 /UL — HIGH (ref 0–5)
TUBE TYPE: SIGNIFICANT CHANGE UP
WBC # BLD: 5.43 K/UL — SIGNIFICANT CHANGE UP (ref 3.8–10.5)
WBC # FLD AUTO: 5.43 K/UL — SIGNIFICANT CHANGE UP (ref 3.8–10.5)

## 2018-07-23 PROCEDURE — 88305 TISSUE EXAM BY PATHOLOGIST: CPT | Mod: 26

## 2018-07-23 PROCEDURE — 49083 ABD PARACENTESIS W/IMAGING: CPT

## 2018-07-23 PROCEDURE — 88108 CYTOPATH CONCENTRATE TECH: CPT | Mod: 26

## 2018-07-23 RX ADMIN — Medication 125 MILLIGRAM(S): at 16:13

## 2018-07-23 RX ADMIN — Medication 500 MILLIGRAM(S): at 05:47

## 2018-07-23 RX ADMIN — HYDROMORPHONE HYDROCHLORIDE 0.5 MILLIGRAM(S): 2 INJECTION INTRAMUSCULAR; INTRAVENOUS; SUBCUTANEOUS at 21:07

## 2018-07-23 RX ADMIN — SIMVASTATIN 10 MILLIGRAM(S): 20 TABLET, FILM COATED ORAL at 22:26

## 2018-07-23 RX ADMIN — PANTOPRAZOLE SODIUM 40 MILLIGRAM(S): 20 TABLET, DELAYED RELEASE ORAL at 18:52

## 2018-07-23 RX ADMIN — HEPARIN SODIUM 5000 UNIT(S): 5000 INJECTION INTRAVENOUS; SUBCUTANEOUS at 18:52

## 2018-07-23 RX ADMIN — HYDROMORPHONE HYDROCHLORIDE 0.5 MILLIGRAM(S): 2 INJECTION INTRAMUSCULAR; INTRAVENOUS; SUBCUTANEOUS at 19:52

## 2018-07-23 RX ADMIN — HYDROMORPHONE HYDROCHLORIDE 0.5 MILLIGRAM(S): 2 INJECTION INTRAMUSCULAR; INTRAVENOUS; SUBCUTANEOUS at 10:53

## 2018-07-23 RX ADMIN — Medication 500 MILLIGRAM(S): at 18:52

## 2018-07-23 RX ADMIN — Medication 81 MILLIGRAM(S): at 15:02

## 2018-07-23 RX ADMIN — HYDROMORPHONE HYDROCHLORIDE 0.5 MILLIGRAM(S): 2 INJECTION INTRAMUSCULAR; INTRAVENOUS; SUBCUTANEOUS at 11:30

## 2018-07-23 RX ADMIN — FINASTERIDE 5 MILLIGRAM(S): 5 TABLET, FILM COATED ORAL at 15:02

## 2018-07-23 RX ADMIN — PANTOPRAZOLE SODIUM 40 MILLIGRAM(S): 20 TABLET, DELAYED RELEASE ORAL at 05:47

## 2018-07-23 RX ADMIN — Medication 8 MILLIGRAM(S): at 22:27

## 2018-07-23 RX ADMIN — ERYTHROPOIETIN 3000 UNIT(S): 10000 INJECTION, SOLUTION INTRAVENOUS; SUBCUTANEOUS at 13:02

## 2018-07-23 RX ADMIN — CHOLESTYRAMINE 4 GRAM(S): 4 POWDER, FOR SUSPENSION ORAL at 22:26

## 2018-07-23 RX ADMIN — Medication 50 MILLILITER(S): at 11:27

## 2018-07-23 RX ADMIN — Medication 0.5 MILLIGRAM(S): at 20:34

## 2018-07-23 RX ADMIN — Medication 0.5 MILLIGRAM(S): at 00:04

## 2018-07-23 RX ADMIN — Medication 100 MILLIGRAM(S): at 15:01

## 2018-07-23 RX ADMIN — ERYTHROPOIETIN 4000 UNIT(S): 10000 INJECTION, SOLUTION INTRAVENOUS; SUBCUTANEOUS at 13:03

## 2018-07-23 NOTE — BRIEF OPERATIVE NOTE - PRE-OP DX
Ascites  07/23/2018    Active  Chance Schroeder  ESRD (end stage renal disease) on dialysis  07/16/2018    Active  Matthew Mayer
ESRD (end stage renal disease) on dialysis  07/16/2018    Active  Matthew Mayer

## 2018-07-23 NOTE — BRIEF OPERATIVE NOTE - OPERATION/FINDINGS
1) Small volume ascites  2) Access of peritoneal cavity under US with 5F Yueh  3) 1800cc clear yellow fluid aspirated  4) Resolution on post US 1) Small volume ascites  2) Access of peritoneal cavity under US with 5F Yueh  3) 2800cc clear yellow fluid aspirated  4) Resolution on post US

## 2018-07-23 NOTE — BRIEF OPERATIVE NOTE - PROCEDURE
<<-----Click on this checkbox to enter Procedure Paracentesis, with US guidance  07/23/2018    Active  AGUEDA

## 2018-07-23 NOTE — PROGRESS NOTE ADULT - SUBJECTIVE AND OBJECTIVE BOX
NEPHROLOGY INTERVAL HPI/OVERNIGHT EVENTS:  s/p paracentesis of 2.8 L  no c/o        MEDICATIONS  (STANDING):  ALBUTerol/ipratropium for Nebulization. 3 milliLiter(s) Nebulizer once  allopurinol 100 milliGRAM(s) Oral daily  aspirin  chewable 81 milliGRAM(s) Oral daily  buDESOnide   0.5 milliGRAM(s) Respule 0.5 milliGRAM(s) Inhalation two times a day  cholestyramine Powder (Sugar-Free) 4 Gram(s) Oral two times a day  diltiazem    Tablet 30 milliGRAM(s) Oral every 6 hours  doxazosin 8 milliGRAM(s) Oral at bedtime  epoetin nelly Injectable 3000 Unit(s) IV Push <User Schedule>  epoetin nelly Injectable 4000 Unit(s) IV Push <User Schedule>  finasteride 5 milliGRAM(s) Oral daily  heparin  Injectable 5000 Unit(s) SubCutaneous every 12 hours  pantoprazole  Injectable 40 milliGRAM(s) IV Push every 12 hours  simvastatin 10 milliGRAM(s) Oral at bedtime  sodium ferric gluconate complex Injectable 125 milliGRAM(s) IV Push <User Schedule>  vancomycin    Solution 500 milliGRAM(s) Oral every 6 hours    MEDICATIONS  (PRN):  acetaminophen   Tablet 650 milliGRAM(s) Oral every 8 hours PRN For Temp greater than 38 C (100.4 F)  acetaminophen   Tablet. 650 milliGRAM(s) Oral every 8 hours PRN Mild Pain (1 - 3)  albumin human 25% IVPB 50 milliLiter(s) IV Intermittent <User Schedule> PRN sbp<=120mmhg  ALBUTerol   0.5% 2.5 milliGRAM(s) Nebulizer every 4 hours PRN Shortness of Breath and/or Wheezing  diphenhydrAMINE   Capsule 25 milliGRAM(s) Oral at bedtime PRN sleep  HYDROmorphone  Injectable 0.5 milliGRAM(s) IV Push every 6 hours PRN Severe Pain (7 - 10)  metoclopramide Injectable 10 milliGRAM(s) IV Push every 8 hours PRN Nausea/Vomiting  ondansetron Injectable 4 milliGRAM(s) IV Push every 6 hours PRN Nausea and/or Vomiting      Allergies    Zosyn (Rash)    Intolerances        I&O's Detail    2018 07:01  -  2018 07:00  --------------------------------------------------------  IN:    Oral Fluid: 180 mL  Total IN: 180 mL    OUT:    Rectal Tube: 200 mL  Total OUT: 200 mL    Total NET: -20 mL          .    Patient was seen and evaluated on dialysis.   Patient is tolerating the procedure well.   Continue dialysis:   Dialyzer:  revacler with bfr of 350-400 on 2k with uf goal of 1.5 kg  spa for fluid removal    Vital Signs Last 24 Hrs  T(C): 36.6 (2018 10:46), Max: 37.3 (2018 14:00)  T(F): 97.8 (2018 10:46), Max: 99.1 (2018 14:00)  HR: 98 (2018 11:43) (56 - 107)  BP: 139/34 (2018 11:43) (106/62 - 142/44)  BP(mean): 58 (2018 08:00) (49 - 68)  RR: 19 (2018 11:43) (14 - 26)  SpO2: 98% (2018 11:43) (87% - 100%)  Daily     Daily Weight in k.5 (2018 04:00)    PHYSICAL EXAM:  General: alert. awake Ox3  HEENT: MMM  CV: s1s2 rrr  LUNGS: B/L CTA  EXT: no edema    LABS:                        7.7    5.43  )-----------( 243      ( 2018 06:00 )             25.3     07-23    144  |  111<H>  |  42<H>  ----------------------------<  79  3.8   |  24  |  4.90<H>    Ca    7.7<L>      2018 06:00  Phos  5.4     07-23  Mg     2.1     07-23    TPro  4.4<L>  /  Alb  2.1<L>  /  TBili  0.4  /  DBili  x   /  AST  7<L>  /  ALT  <6<L>  /  AlkPhos  48  07    PT/INR - ( 2018 06:19 )   PT: 12.2 sec;   INR: 1.13 ratio         PTT - ( 2018 06:19 )  PTT:30.1 sec    Phosphorus Level, Serum: 5.4 mg/dL ( @ 06:00)  Magnesium, Serum: 2.1 mg/dL ( @ 06:00) NEPHROLOGY INTERVAL HPI/OVERNIGHT EVENTS:  s/p paracentesis of 2.8 L  no c/o        MEDICATIONS  (STANDING):  ALBUTerol/ipratropium for Nebulization. 3 milliLiter(s) Nebulizer once  allopurinol 100 milliGRAM(s) Oral daily  aspirin  chewable 81 milliGRAM(s) Oral daily  buDESOnide   0.5 milliGRAM(s) Respule 0.5 milliGRAM(s) Inhalation two times a day  cholestyramine Powder (Sugar-Free) 4 Gram(s) Oral two times a day  diltiazem    Tablet 30 milliGRAM(s) Oral every 6 hours  doxazosin 8 milliGRAM(s) Oral at bedtime  epoetin nelly Injectable 3000 Unit(s) IV Push <User Schedule>  epoetin nelly Injectable 4000 Unit(s) IV Push <User Schedule>  finasteride 5 milliGRAM(s) Oral daily  heparin  Injectable 5000 Unit(s) SubCutaneous every 12 hours  pantoprazole  Injectable 40 milliGRAM(s) IV Push every 12 hours  simvastatin 10 milliGRAM(s) Oral at bedtime  sodium ferric gluconate complex Injectable 125 milliGRAM(s) IV Push <User Schedule>  vancomycin    Solution 500 milliGRAM(s) Oral every 6 hours    MEDICATIONS  (PRN):  acetaminophen   Tablet 650 milliGRAM(s) Oral every 8 hours PRN For Temp greater than 38 C (100.4 F)  acetaminophen   Tablet. 650 milliGRAM(s) Oral every 8 hours PRN Mild Pain (1 - 3)  albumin human 25% IVPB 50 milliLiter(s) IV Intermittent <User Schedule> PRN sbp<=120mmhg  ALBUTerol   0.5% 2.5 milliGRAM(s) Nebulizer every 4 hours PRN Shortness of Breath and/or Wheezing  diphenhydrAMINE   Capsule 25 milliGRAM(s) Oral at bedtime PRN sleep  HYDROmorphone  Injectable 0.5 milliGRAM(s) IV Push every 6 hours PRN Severe Pain (7 - 10)  metoclopramide Injectable 10 milliGRAM(s) IV Push every 8 hours PRN Nausea/Vomiting  ondansetron Injectable 4 milliGRAM(s) IV Push every 6 hours PRN Nausea and/or Vomiting      Allergies    Zosyn (Rash)    Intolerances        I&O's Detail    2018 07:01  -  2018 07:00  --------------------------------------------------------  IN:    Oral Fluid: 180 mL  Total IN: 180 mL    OUT:    Rectal Tube: 200 mL  Total OUT: 200 mL    Total NET: -20 mL          .    Patient was seen and evaluated on dialysis.   Patient is tolerating the procedure well.   Continue dialysis:   Dialyzer:  revacler with bfr of 350-400 on 3k with uf goal of 1.5 kg  spa for fluid removal    Vital Signs Last 24 Hrs  T(C): 36.6 (2018 10:46), Max: 37.3 (2018 14:00)  T(F): 97.8 (2018 10:46), Max: 99.1 (2018 14:00)  HR: 98 (2018 11:43) (56 - 107)  BP: 139/34 (2018 11:43) (106/62 - 142/44)  BP(mean): 58 (2018 08:00) (49 - 68)  RR: 19 (2018 11:43) (14 - 26)  SpO2: 98% (2018 11:43) (87% - 100%)  Daily     Daily Weight in k.5 (2018 04:00)    PHYSICAL EXAM:  General: alert. awake Ox3  HEENT: MMM  CV: s1s2 rrr  LUNGS: B/L CTA  EXT: no edema    LABS:                        7.7    5.43  )-----------( 243      ( 2018 06:00 )             25.3     07-23    144  |  111<H>  |  42<H>  ----------------------------<  79  3.8   |  24  |  4.90<H>    Ca    7.7<L>      2018 06:00  Phos  5.4     07-23  Mg     2.1     07-23    TPro  4.4<L>  /  Alb  2.1<L>  /  TBili  0.4  /  DBili  x   /  AST  7<L>  /  ALT  <6<L>  /  AlkPhos  48  07    PT/INR - ( 2018 06:19 )   PT: 12.2 sec;   INR: 1.13 ratio         PTT - ( 2018 06:19 )  PTT:30.1 sec    Phosphorus Level, Serum: 5.4 mg/dL ( @ 06:00)  Magnesium, Serum: 2.1 mg/dL ( @ 06:00)

## 2018-07-23 NOTE — PROGRESS NOTE ADULT - PROBLEM SELECTOR PLAN 5
non recurrent, now in sinus rhythm RRR  - EP consult appreciated: likely vagal, may be 2/2 SHAYY.  -tele monitoring   -Cards recommend outpatient sleep apnea eval

## 2018-07-23 NOTE — PROGRESS NOTE ADULT - SUBJECTIVE AND OBJECTIVE BOX
Patient is a 83y old  Male who presents with a chief complaint of Fever/Diarrhea (20 Jul 2018 13:30)      HPI:  Pt is a 82 y/o M w/pmhx of Afib, CHF, CAD s/p stents, COPD, PNA, upper GI bleed (duodenal)  BIB EMS from [facility] living for fever, abd pain, and diarrhea that began 3 weeks ago. Was in the hospital for PNA tx and was later dx with C-diff and started on a course of PO vancomycin for 10 days for the C-diff. States that he has had diarrhea since before the course of abx. Pain has been increased for the past few weeks and is characterized as a cramping feeling. Daughters also note that there is increased distension. Also notes chest pain characterized as a cramping in the central chest. 83% O2 sat noted this morning at the assisted living facility. Takes O2 routinely at night but not during the day. (06 Jul 2018 23:55)    Diarrhea improving, fecal diarrhea remains in place. Negative nausea vomiting. Decreased appetite, mild lower abdominal cramping.  Had some low O2 sats last night      PAST MEDICAL & SURGICAL HISTORY:  Diastolic CHF  Peripheral vascular disease  Afib  Anemia  CKD (chronic kidney disease)  COPD (chronic obstructive pulmonary disease)  SHAYY (obstructive sleep apnea)  Sepsis, due to unspecified organism: 2/2 poorly healing wounds b/l  Dyspepsia: On moderate exertion.  Sleep apnea, obstructive: Requires home 02 therapy, and treatment with BIPAP  Atelectasis  Pleural effusion, bilateral  Respiratory failure  Peripheral edema  CRI (chronic renal insufficiency)  Gout  Benign prostatic hypertrophy  Spinal stenosis  Hypercholesterolemia  GERD (gastroesophageal reflux disease)  CAD (coronary artery disease)  Hypertension  S/P angioplasty with stent  Cataract of left eye  Prostate: Surgery green light procedure.  S/P rotator cuff surgery: Right  S/P angioplasty      MEDICATIONS  (STANDING):  ALBUTerol/ipratropium for Nebulization. 3 milliLiter(s) Nebulizer once  allopurinol 100 milliGRAM(s) Oral daily  aspirin  chewable 81 milliGRAM(s) Oral daily  buDESOnide   0.5 milliGRAM(s) Respule 0.5 milliGRAM(s) Inhalation two times a day  cholestyramine Powder (Sugar-Free) 4 Gram(s) Oral two times a day  diltiazem    Tablet 30 milliGRAM(s) Oral every 6 hours  doxazosin 8 milliGRAM(s) Oral at bedtime  epoetin nelly Injectable 3000 Unit(s) IV Push <User Schedule>  epoetin nelly Injectable 4000 Unit(s) IV Push <User Schedule>  finasteride 5 milliGRAM(s) Oral daily  heparin  Injectable 5000 Unit(s) SubCutaneous every 12 hours  pantoprazole  Injectable 40 milliGRAM(s) IV Push every 12 hours  simvastatin 10 milliGRAM(s) Oral at bedtime  sodium ferric gluconate complex Injectable 125 milliGRAM(s) IV Push <User Schedule>  vancomycin    Solution 500 milliGRAM(s) Oral every 6 hours    MEDICATIONS  (PRN):  acetaminophen   Tablet 650 milliGRAM(s) Oral every 8 hours PRN For Temp greater than 38 C (100.4 F)  acetaminophen   Tablet. 650 milliGRAM(s) Oral every 8 hours PRN Mild Pain (1 - 3)  albumin human 25% IVPB 50 milliLiter(s) IV Intermittent <User Schedule> PRN sbp<=120mmhg  ALBUTerol   0.5% 2.5 milliGRAM(s) Nebulizer every 4 hours PRN Shortness of Breath and/or Wheezing  diphenhydrAMINE   Capsule 25 milliGRAM(s) Oral at bedtime PRN sleep  HYDROmorphone  Injectable 0.5 milliGRAM(s) IV Push every 6 hours PRN Severe Pain (7 - 10)  ondansetron Injectable 4 milliGRAM(s) IV Push every 6 hours PRN Nausea and/or Vomiting      Allergies    Zosyn (Rash)    Intolerances        SOCIAL HISTORY:NC    FAMILY HISTORY:  No pertinent family history in first degree relatives      REVIEW OF SYSTEMS:    CONSTITUTIONAL: No weakness, fevers or chills  EYES/ENT: No visual changes;  No vertigo or throat pain   NECK: No pain or stiffness  RESPIRATORY: No cough, wheezing, hemoptysis; No shortness of breath  CARDIOVASCULAR: No chest pain or palpitations  GENITOURINARY: No dysuria, frequency or hematuria  NEUROLOGICAL: No numbness or weakness  SKIN: No itching, burning, rashes, or lesions   All other review of systems is negative unless indicated above.    Vital Signs Last 24 Hrs  T(C): 36.4 (23 Jul 2018 17:00), Max: 36.8 (22 Jul 2018 20:00)  T(F): 97.6 (23 Jul 2018 17:00), Max: 98.3 (22 Jul 2018 20:00)  HR: 79 (23 Jul 2018 17:00) (56 - 120)  BP: 111/29 (23 Jul 2018 17:00) (106/62 - 144/51)  BP(mean): 50 (23 Jul 2018 17:00) (50 - 72)  RR: 17 (23 Jul 2018 17:00) (14 - 26)  SpO2: 100% (23 Jul 2018 18:23) (87% - 100%)    PHYSICAL EXAM:    Constitutional: NAD, well-developed  HEENT: EOMI, throat clear  Neck: No LAD, supple  Respiratory: CTA and P  Cardiovascular: S1 and S2, RRR, no M  Gastrointestinal: BS+, soft, NT/ND, neg HSM,  Extremities: No peripheral edema, neg clubing, cyanosis  Vascular: 2+ peripheral pulses  Neurological: A/O x 3, no focal deficits  Psychiatric: Normal mood, normal affect  Skin: No rashes    LABS:  CBC Full  -  ( 23 Jul 2018 06:00 )  WBC Count : 5.43 K/uL  Hemoglobin : 7.7 g/dL  Hematocrit : 25.3 %  Platelet Count - Automated : 243 K/uL  Mean Cell Volume : 96.6 fl  Mean Cell Hemoglobin : 29.4 pg  Mean Cell Hemoglobin Concentration : 30.4 gm/dL  Auto Neutrophil # : x  Auto Lymphocyte # : x  Auto Monocyte # : x  Auto Eosinophil # : x  Auto Basophil # : x  Auto Neutrophil % : x  Auto Lymphocyte % : x  Auto Monocyte % : x  Auto Eosinophil % : x  Auto Basophil % : x    07-23    144  |  111<H>  |  42<H>  ----------------------------<  79  3.8   |  24  |  4.90<H>    Ca    7.7<L>      23 Jul 2018 06:00  Phos  5.4     07-23  Mg     2.1     07-23    TPro  4.7<L>  /  Alb  2.3<L>  /  TBili  x   /  DBili  x   /  AST  x   /  ALT  x   /  AlkPhos  x   07-23    PT/INR - ( 22 Jul 2018 06:19 )   PT: 12.2 sec;   INR: 1.13 ratio         PTT - ( 22 Jul 2018 06:19 )  PTT:30.1 sec    07-10 @ 16:30  Hep A Igm Nonreact  Hep A total ab, IgA and M --  Hep B core Ab total --  Hep B core IgM Nonreact  Hep B DNA PCR --  Hep BSAg --  Hep BSAb --  Hep BSAb --  HCV Ab --  HCV RNA Log --  HCV RNA interp --                RADIOLOGY & ADDITIONAL STUDIES:

## 2018-07-23 NOTE — PROGRESS NOTE ADULT - ASSESSMENT
Pseudomembranous colitis status post recent antibiotics    By mouth vancomycin   case discussed with  family  improving  appetite may improve after DC flagyl, encourage PO intake        Patient had a recent course of C. difficile that was treated with vancomycin with a recurrence and he's had C. difficile in the past.  Due to severe C. difficile, would strongly consider a prolonged taper of vancomycin.  For now, would complete two-week course of vancomycin and then would taper her vancomycin over a long time.  Discussed with family.  Taper vancomycin  Vancomycin 125 mg 4 times a day, one more week  Then 3 times a day for 2 weeks, then twice a day for one week, then daily for one week, then every other day for 1 week, then every 3rd day for 1 week.  Follow-up with me after that    A fibrillation with rapid ventricular rhythm, status post Cardizem drip.    Would hold anticoagulation secondary to history bleeding    COPD obesity, obstructive sleep apnea, coronary artery disease, overall comorbid risk factors     IVF per renal  HD as needed    Dw family

## 2018-07-23 NOTE — PROGRESS NOTE ADULT - ASSESSMENT
82 y/o wm with hx of ckd stage 3 ( scr 1.5-1.7) with ARYA due to volume depletion from CDiff associated colitis and diarrhea in presence of diuretic use PTA.  Now with non anion gap metabolic acidosis and worsening renal parameters.  CXR with mild CHF with hx of diastolic CHF.    PLAN  - obtain abd and lactate  - will change ivf and add bicarbonate and keep at current rate  - hold lasix done.   - fu uop and scr closely and will monitor respiratory status  - bp and hr stable now, cardizem adjusted and lopressor added    7/9 MK  - ARYA: worsening renal parameters with metabolic acidosis, non ag.  Previous lactate WNL and since placed on bicarbonate drip  fu repeat abg today.  Increase IVF rate  possibility of HD discussed with enio, hcp daughter, pt has been agreeable in past.   DC hydralazine  dc morphine and change to dilaudid  - Cdiff with rising scr and worsening abd distension: CRS consult ongoing  possible repeat ct scan  DW Dr ballesteros    7/10  Anuric  anasarca  Non anion gap acidosis on bicarb drip  ARYA, anuric  CKD 3 history  d/w Family, and pt agreeable  called ICU for line placement and to dialyze the pt in ICU for monitoring    7/10  HD initiated via R temp HD catheter  tolerating well  no complaints  good abf    7/11 SY  --ARYA with Anasarca.  Urine output slightly improved.  However, remains quite fluid overloaded.  Will plan to continue HD until fluid status is much improved.  HD order written.  3 hour tx today.  Will aim to remove 2.5 kg as tolerated.  --C DIff colitis ; continue to monitor closely.    7/12 SY  --ARYA with Anasarca.   Remains Oligo-anuric.    --HD with goal of 2.5 kg UF again today.  --C Diff colitis.   Clinically improved   Continue to monitor.    7/13  The patient was dialyzed 3 days in a row this week  Discussed with the patient's daughter and hospitalist, intensivist  Will skip dialysis today, no urgent need for dialysis  We'll dialyze the patient tomorrow on Saturday and then skip Sunday to dialyze the patient again on Monday.    7/14  We'll dialyze against 4 K bath  Case discussed with the patient's daughter  May need a permanent catheter by Monday      7/15  Dialyze with a 4K bath  Potassium is 3.3 most likely from the diarrhea  Schedule for permacath placement after dialysis on Monday or Tuesday  Patient is comfortable no complications noted at this time    7/16 MK  - ARYA/ CKD stage 3 remains oliguirc and HD dependent.  LImited UF at HD toleated with the   hypokalemia corrected with HD.  Permcath planned today  - Cdiff: on PO vanc.  improved leukocytosis and abd distension     7/17  s/p perm cath  HD tomorrow  4 K bath  replace K   overall stable    7/18 SY  --ARYA/CKD for now remaining dialysis dependent.  Continue TIW HD.  --C Diff colitis ; improving clinically.    7/19 SY  --ARYA/CKD : Continue TIW HD.   Will continue as outpatient for now as no signs of recovery at this point.  --C Diff colitis  : clinically improving.  Continue po abtx.  --D/c Planning  D/w pt's daughter re : rehab with HD.  --Replete K.  Continue regular diet without restrictions for now.    7/20 MK  - ARYA/CKD remains HD dependent with oliguria.  Will attempt UF with HD.  K correction with HD and changed to regular diet with fluid restriction  - Cdiff: PO vanc with improving renal paramenters-  - Episodes of Desat: will have pulmonary re-eval prior to dc.  - DC planning rehab with dialysis  dw Dr Hightower  will see pt    7/21 MK  - ARYA/CKD remains hd dependent. post hd cxr with improved PVC.  Still with episodes of desaturation and dr hightower input noted.  CT with evidence of ascites for paracentesis  - cdiff: on po vanc.  still with loose stools,     7/22 MK  - ARYA/CKD remains HD dependent.  Will coordinate AM HD based upon timing of paracentesis, hypokalemia improved  - hypoxia with ascites: for paracentesis   - cdif on po vanc, rectal tube in place    7/23 MK  - ARYA/CKD remains HD dependent, toelrating hd.  + inc uop   - hypoxia with ascites: s/p paracentesis today, monitor response to oxygenation  - AOCD; fu trend of h/h, on epogen  - cdiff: po vanc

## 2018-07-23 NOTE — PROGRESS NOTE ADULT - SUBJECTIVE AND OBJECTIVE BOX
84 y/o M w/pmhx of Afib, CHF, CAD s/p stents, COPD, recent PNA on abx, upper GI bleed (duodenal)  BIB EMS from [facility] for fever, abd pain, and diarrhea that began 3 weeks ago. Was in the hospital for PNA tx and was later dx with C-diff and started on a course of PO vancomycin for 10 days for the C-diff. States that he has had diarrhea since before the course of abx. Pain has been increased for the past few weeks and is characterized as a cramping feeling. Daughters also note that there is increased distension. Also notes chest pain characterized as a cramping in the central chest. 83% O2 sat noted morning of admission on 7/6/18, at the assisted living facility. Takes O2 routinely at night but not during the day.    7/23/18 - Patient seen and examined at bedside s/p paracentesis (1800 cc clear yellow fluid removed). Patient with no complaints at this time and receiving hemodialysis. As per nurse, pt requiring bipap at night and de saturating down to 89 today on 35% O2. Pt will be transferred to medical floors today.     Vital Signs Last 24 Hrs  T(C): 36.4 (23 Jul 2018 14:04), Max: 36.8 (22 Jul 2018 20:00)  T(F): 97.6 (23 Jul 2018 14:04), Max: 98.3 (22 Jul 2018 20:00)  HR: 76 (23 Jul 2018 15:00) (56 - 120)  BP: 136/35 (23 Jul 2018 15:00) (106/62 - 144/40)  BP(mean): 62 (23 Jul 2018 15:00) (51 - 68)  RR: 19 (23 Jul 2018 15:00) (14 - 26)  SpO2: 97% (23 Jul 2018 15:00) (87% - 99%)    PHYSICAL EXAM:  Constitutional: NAD  HEENT: PERR, EOMI, Normal Hearing, MMM  Neck: Soft and supple, No LAD,   Respiratory: Breath sounds are clear bilaterally, No wheezing, rales or rhonchi  Cardiovascular: S1 and S2, regular rate and rhythm, no Murmurs, gallops or rubs  Gastrointestinal: Bowel Sounds present, soft, nontender, Distended+, no rigidity, no rebound  Extremities: LLE lesion with dressings c/d/i, RLE s/p AKA +  Vascular: 2+ peripheral pulses  Neurological: A/O x 3, no focal deficits  Musculoskeletal: unable to assess  Skin: Incontinence associated Dermatitis with Rectal tube+ output green/loose stool    MEDICATIONS  (STANDING):  ALBUTerol/ipratropium for Nebulization. 3 milliLiter(s) Nebulizer once  allopurinol 100 milliGRAM(s) Oral daily  aspirin  chewable 81 milliGRAM(s) Oral daily  buDESOnide   0.5 milliGRAM(s) Respule 0.5 milliGRAM(s) Inhalation two times a day  cholestyramine Powder (Sugar-Free) 4 Gram(s) Oral two times a day  diltiazem    Tablet 30 milliGRAM(s) Oral every 6 hours  doxazosin 8 milliGRAM(s) Oral at bedtime  epoetin nelly Injectable 3000 Unit(s) IV Push <User Schedule>  epoetin nelly Injectable 4000 Unit(s) IV Push <User Schedule>  finasteride 5 milliGRAM(s) Oral daily  heparin  Injectable 5000 Unit(s) SubCutaneous every 12 hours  pantoprazole  Injectable 40 milliGRAM(s) IV Push every 12 hours  simvastatin 10 milliGRAM(s) Oral at bedtime  sodium ferric gluconate complex Injectable 125 milliGRAM(s) IV Push <User Schedule>  vancomycin    Solution 500 milliGRAM(s) Oral every 6 hours    MEDICATIONS  (PRN):  acetaminophen   Tablet 650 milliGRAM(s) Oral every 8 hours PRN For Temp greater than 38 C (100.4 F)  acetaminophen   Tablet. 650 milliGRAM(s) Oral every 8 hours PRN Mild Pain (1 - 3)  albumin human 25% IVPB 50 milliLiter(s) IV Intermittent <User Schedule> PRN sbp<=120mmhg  ALBUTerol   0.5% 2.5 milliGRAM(s) Nebulizer every 4 hours PRN Shortness of Breath and/or Wheezing  diphenhydrAMINE   Capsule 25 milliGRAM(s) Oral at bedtime PRN sleep  HYDROmorphone  Injectable 0.5 milliGRAM(s) IV Push every 6 hours PRN Severe Pain (7 - 10)  ondansetron Injectable 4 milliGRAM(s) IV Push every 6 hours PRN Nausea and/or Vomiting      LABS: All Labs Reviewed:                                                       7.7    5.43  )-----------( 243      ( 23 Jul 2018 06:00 )             25.3       07-23    144  |  111<H>  |  42<H>  ----------------------------<  79  3.8   |  24  |  4.90<H>    Ca    7.7<L>      23 Jul 2018 06:00  Phos  5.4     07-23  Mg     2.1     07-23    TPro  4.7<L>  /  Alb  2.3<L>  /  TBili  x   /  DBili  x   /  AST  x   /  ALT  x   /  AlkPhos  x   07-23      LIVER FUNCTIONS - ( 23 Jul 2018 12:45 )  Alb: 2.3 g/dL / Pro: 4.7 gm/dL / ALK PHOS: x     / ALT: x     / AST: x     / GGT: x               CAPILLARY BLOOD GLUCOSE      I&O's Summary    22 Jul 2018 07:01  -  23 Jul 2018 07:00  --------------------------------------------------------  IN: 180 mL / OUT: 200 mL / NET: -20 mL    23 Jul 2018 07:01  -  23 Jul 2018 17:14  --------------------------------------------------------  IN: 750 mL / OUT: 0 mL / NET: 750 mL      Blood Culture:   Culture - Urine (07.10.18 @ 16:30)    Specimen Source: .Urine Catheterized    Culture Results:   <10,000 CFU/ml  Normal Urogenital ravinder present    Culture - Blood (07.06.18 @ 13:42)    Specimen Source: .Blood Blood-Peripheral    Culture Results:   No growth at 5 days.      RADIOLOGY/EKG:  < from: Xray Chest 1 View- PORTABLE-Urgent (07.16.18 @ 18:34) >  IMPRESSION:    Findings suggesting persistent mild CHF with mild pulmonary vascular   congestion and trace fluid in the right minor fissure.    Stable right CVC.    < end of copied text >    < from: Transthoracic Echocardiogram (07.10.18 @ 10:21) >   Impression     Summary     Fibrocalcific changes noted to the mitral valve leaflets with preserved   leaflet excursion.   Moderate (2+) mitral regurgitation is present.   The left atrium is moderately dilated.   Mild concentric left ventricular hypertrophy is present.   Estimated left ventricular ejection fraction is 55-60 %.    < end of copied text >    < from: CT Abdomen and Pelvis No Cont (07.21.18 @ 11:15) >  IMPRESSION:     Stable appearance of pancolitis consistent with pseudomembranous colitis.   No pneumatosis or free air.    Increasing moderate ascites.    < end of copied text >      DVT PPX: NO AC due to severe GI bleed in past

## 2018-07-23 NOTE — PROVIDER CONTACT NOTE (OTHER) - SITUATION
DR YOUNGBLOOD SERVICE AWARE OF CONSULT
call service aware of consult : spoke to Edda
A fib with RVR  Service aware
Left message with service
Pulmonary consult called in by FARHAD Street, to Dr. Maxwell's service
Tele service spoke with Mariel to request consult.

## 2018-07-23 NOTE — PROGRESS NOTE ADULT - PROBLEM SELECTOR PLAN 2
- continue to maintain fluid balance with HD TIW  - control HFpEF with medical management - Toprol 25mg BID  - O2 as needed  - Pulm appreciated, add nebs  - Incentive Spirometry  - CXR shows improvement of Pulmonary Vascular Congestion  - CT Abdomen showing Moderate Ascites (2/2 hypoalbuminemia ?)- contributing to desaturation - on albumin 25% IVPB MWF   -Paracentesis 7/23- 1800 cc removed  -f/u fluid LDH protein and serum LDH and protein - continue to maintain fluid balance with HD TIW  - control HFpEF with medical management - Toprol 25mg BID  - O2 as needed  - Pulm appreciated, add nebs  - Incentive Spirometry  - CXR shows improvement of Pulmonary Vascular Congestion  - CT Abdomen showing Moderate Ascites (2/2 hypoalbuminemia ?)- contributing to desaturation - on albumin 25% IVPB MWF   -Paracentesis 7/23- 1800 cc removed   -serum LDH, protein, albumin-160, 4.7, 2.3  -f/u fluid LDH and protein

## 2018-07-23 NOTE — PROGRESS NOTE ADULT - SUBJECTIVE AND OBJECTIVE BOX
Patient is a 83y old  Male who presents with a chief complaint of complain of diarrhea, fever (06 Jul 2018 23:55)      HPI:  Pt is a 82 y/o M w/pmhx of Afib, CHF, CAD s/p stents, COPD, PNA, upper GI bleed (duodenal)  BIB EMS from [facility] assisted living for fever, abd pain, and diarrhea that began 3 weeks ago. Was in the hospital for PNA tx and was later dx with C-diff and started on a course of PO vancomycin for 10 days for the C-diff. States that he has had diarrhea since before the course of abx. Pain has been increased for the past few weeks and is characterized as a cramping feeling. Daughters also note that there is increased distension. Also notes chest pain characterized as a cramping in the central chest. 83% O2 sat noted this morning at the assisted living facility. Takes O2 routinely at night but not during the day. (06 Jul 2018 23:55)  Patient's daughter states he was treated for ESBL with antibiotics prior to this  7/10  seen and examined with his dter at bedside  hx of resp failure and intubation 2012 x 3-4 days  feels ok with breathing now  being evaluated for surgical interventions with pancolitis  possibility of post op need for vent support discussed witrh pt and his dtr  he wants to proceed understanding high risk for surgery due to compromised medical condition  hx SHAYY and treated with BIPAP in the past / stopped using it after wt loss  7/11  seen in ICU with dtr at bedside  data discussed with critical care RN  s/p Ross and hemodialysis  still with abd distention  family wants to wait on abd surgery given he is high risk  7/12  family at bedside updated  no cp  no difficulty breathing  intermittent wheezing  7/13  NGT is discontinued  dtr at bedside updated  no issue with breathing now  7/14  having HD  some po intake tolerated ok  dtr at bedside updated  no active pulm issues  7/15  resting comfortably  no distress  Gi and renal eval noted  7/16  feels better  having dialysis  awake and alert  po intake tolerated well  7/17  dtr at bedside  s/lauren tate placed right chest  no cp    7/21  I was asked by Dr Tati Tomas to re eval for reported episodes of desaturations  DTR at bedside  data discussed at length  pt's reported hypoxemic episodes after narcotics / while sleeping or having abd distention  he has known hx SHAYY / OHS and prior BIPAP 12/8 with O2 attached at home  this am after 35% VM was removed in his room, RA O2 sat 88%-93% range  no distress  no significant cough  occasional wheeze    7/22  feels the same  family updated at bedside  reported plan for paracentesis in am  vomited once  7/23  data rev with critical care RN at bedside this am  overnight desat required 35% VM, o2 sat this am 93%  no distress  sleeping  plan for paracentesis   PAST MEDICAL & SURGICAL HISTORY:  Diastolic CHF  Peripheral vascular disease  Afib  Anemia  CKD (chronic kidney disease)  COPD (chronic obstructive pulmonary disease)  SHAYY (obstructive sleep apnea)  Sepsis, due to unspecified organism: 2/2 poorly healing wounds b/l  Dyspepsia: On moderate exertion.  Sleep apnea, obstructive: Requires home 02 therapy, and treatment with BIPAP  Atelectasis  Pleural effusion, bilateral  Respiratory failure  Peripheral edema  CRI (chronic renal insufficiency)  Gout  Benign prostatic hypertrophy  Spinal stenosis  Hypercholesterolemia  GERD (gastroesophageal reflux disease)  CAD (coronary artery disease)  Hypertension  S/P angioplasty with stent  Cataract of left eye  Prostate: Surgery green light procedure.  S/P rotator cuff surgery: Right  S/P angioplasty    MEDICATIONS  (STANDING):  ALBUTerol/ipratropium for Nebulization. 3 milliLiter(s) Nebulizer once  allopurinol 100 milliGRAM(s) Oral daily  aspirin  chewable 81 milliGRAM(s) Oral daily  buDESOnide   0.5 milliGRAM(s) Respule 0.5 milliGRAM(s) Inhalation two times a day  cholestyramine Powder (Sugar-Free) 4 Gram(s) Oral two times a day  diltiazem    Tablet 30 milliGRAM(s) Oral every 6 hours  doxazosin 8 milliGRAM(s) Oral at bedtime  epoetin nelly Injectable 3000 Unit(s) IV Push <User Schedule>  epoetin nelly Injectable 4000 Unit(s) IV Push <User Schedule>  finasteride 5 milliGRAM(s) Oral daily  heparin  Injectable 5000 Unit(s) SubCutaneous every 12 hours  pantoprazole  Injectable 40 milliGRAM(s) IV Push every 12 hours  simvastatin 10 milliGRAM(s) Oral at bedtime  sodium ferric gluconate complex Injectable 125 milliGRAM(s) IV Push <User Schedule>  vancomycin    Solution 500 milliGRAM(s) Oral every 6 hours        MEDICATIONS  (PRN):  acetaminophen   Tablet 650 milliGRAM(s) Oral every 8 hours PRN For Temp greater than 38 C (100.4 F)  acetaminophen   Tablet. 650 milliGRAM(s) Oral every 8 hours PRN Mild Pain (1 - 3)  ALBUTerol   0.5% 2.5 milliGRAM(s) Nebulizer every 4 hours PRN Shortness of Breath and/or Wheezing  morphine  - Injectable 2 milliGRAM(s) IV Push every 6 hours PRN Severe Pain (7 - 10)  ondansetron Injectable 4 milliGRAM(s) IV Push every 6 hours PRN Nausea and/or Vomiting      FAMILY HISTORY:  No pertinent family history in first degree relatives    REVIEW OF SYSTEM:  abdominal pain, otherwise 12 point ROS negative     Vital Signs Last 24 Hrs  T(C): 36.3 (23 Jul 2018 04:00), Max: 37.3 (22 Jul 2018 14:00)  T(F): 97.3 (23 Jul 2018 04:00), Max: 99.1 (22 Jul 2018 14:00)  HR: 68 (23 Jul 2018 06:00) (56 - 80)  BP: 120/33 (23 Jul 2018 06:00) (112/35 - 142/44)  BP(mean): 56 (23 Jul 2018 06:00) (49 - 68)  RR: 21 (23 Jul 2018 06:00) (14 - 26)  SpO2: 94% (23 Jul 2018 06:00) (87% - 100%)  PHYSICAL EXAM  General Appearance: cooperative, no acute distress,   HEENT: PERRL, conjunctiva clear, EOM's intact, non injected pharynx, no exudate, TM   normal  Neck: Supple, , no adenopathy, thyroid: not enlarged, no carotid bruit or JVD  Back: Symmetric, no  tenderness,no soft tissue tenderness  Lungs:IMPROVED Diminished bilaterally R>L with some dullness to percussion  Heart: Regular rate and rhythm, S1, S2 normal, no murmur, rub or gallop  Abdomen:  decreased-tender,no active bowel sounds, Distended , no hepatosplenomegaly  Extremities: pos peripheral edema / Hx Right leg amputation  Skin: Skin color, texture normal, no rashes   Neurologic: Alert and oriented X3 , cranial nerves intact, sensory and motor normal,    ECG:    LABS:                                   8.0    6.95  )-----------( 247      ( 22 Jul 2018 06:19 )             26.7   07-22    144  |  111<H>  |  33<H>  ----------------------------<  104<H>  4.1   |  25  |  4.10<H>    Ca    7.3<L>      22 Jul 2018 06:19  Mg     1.8     07-22    TPro  4.4<L>  /  Alb  2.1<L>  /  TBili  0.4  /  DBili  x   /  AST  7<L>  /  ALT  <6<L>  /  AlkPhos  48  07-22               7.5    9.09  )-----------( 225      ( 21 Jul 2018 06:08 )             24.5   07-21    139  |  107  |  24<H>  ----------------------------<  101<H>  3.2<L>   |  28  |  3.31<H>    Ca    7.5<L>      21 Jul 2018 06:08  Phos  4.1     07-20  Mg     1.9     07-21    TPro  x   /  Alb  2.1<L>  /  TBili  x   /  DBili  x   /  AST  x   /  ALT  x   /  AlkPhos  x   07-20                                   8.4    10.13 )-----------( 199      ( 17 Jul 2018 05:41 )             26.8   07-17    142  |  108  |  19  ----------------------------<  89  3.4<L>   |  25  |  3.02<H>    Ca    7.2<L>      17 Jul 2018 05:41                 8.4    8.91  )-----------( 181      ( 15 Jul 2018 06:16 )             26.4   07-15    142  |  107  |  23  ----------------------------<  101<H>  3.3<L>   |  26  |  3.04<H>    Ca    7.5<L>      15 Jul 2018 06:16                            8.2    10.15 )-----------( 183      ( 13 Jul 2018 05:14 )             26.1   07-13    143  |  107  |  26<H>  ----------------------------<  89  3.5   |  26  |  2.62<H>    Ca    7.2<L>      13 Jul 2018 05:14  Phos  4.3     07-12  Mg     2.0     07-12    TPro  x   /  Alb  2.0<L>  /  TBili  x   /  DBili  x   /  AST  x   /  ALT  x   /  AlkPhos  x   07-12                            8.3    10.62 )-----------( 202      ( 12 Jul 2018 05:17 )             26.8   07-12    141  |  107  |  33<H>  ----------------------------<  66<L>  3.9   |  23  |  2.84<H>    Ca    7.4<L>      12 Jul 2018 05:17  Phos  4.3     07-12  Mg     2.0     07-12    TPro  x   /  Alb  2.0<L>  /  TBili  x   /  DBili  x   /  AST  x   /  ALT  x   /  AlkPhos  x   07-12                          8.8    20.36 )-----------( 265      ( 10 Jul 2018 05:47 )             28.0   07-11    142  |  108  |  52<H>  ----------------------------<  80  4.0   |  23  |  3.82<H>    Ca    7.0<L>      11 Jul 2018 06:20  Phos  4.9     07-11  Mg     1.9     07-11                              8.1    20.78 )-----------( 239      ( 09 Jul 2018 05:48 )             25.6     07-09    144  |  114<H>  |  65<H>  ----------------------------<  113<H>  4.5   |  17<L>  |  3.90<H>    Ca    7.1<L>      09 Jul 2018 05:48  Mg     1.9     07-09      CARDIAC MARKERS ( 07 Jul 2018 17:51 )  0.024 ng/mL / x     / x     / x     / x                  ABG - ( 08 Jul 2018 15:47 )  pH, Arterial: 7.18  pH, Blood: x     /  pCO2: 39    /  pO2: 68    / HCO3: 14    / Base Excess: -13.1 /  SaO2: 92            < from: Xray Chest 1 View-PORTABLE IMMEDIATE (07.10.18 @ 15:50) >  INTERPRETATION:  CLINICAL INDICATION:  R  I J dialysis line placement    TECHNIQUE: Single view AP chest radiograph was performed.    COMPARISON:  Chest radiograph performed earlier on the same day,   7/10/2018 at 8:28 AM and chest radiograph dated 7/6/2018.    FINDINGS:    Interval placement of right-sided jugular central venous catheter with   tip projecting over the right atrium. No evidence forpneumothorax.    There is persistent mild pulmonary vascular congestion. There is trace   fluid within the right minor fissure.    The cardiac silhouette size is stable. Diffuse aortic calcifications are   noted    Redemonstration of anchor screw within the right humeral head.     IMPRESSION:    Interval placement of right-sided IJ CVC, with tip projecting over the   right atrium. No pneumothorax.     < end of copied text >          RADIOLOGY & ADDITIONAL STUDIES:  IMPRESSION:     Severe pancolitis consistent with pseudomembranous colitis.    Mild pulmonary edema.    Small loculated right and trace layering left pleural effusions.    < from: Xray Chest 1 View- PORTABLE-Routine (07.20.18 @ 15:52) >    PROCEDURE DATE:  07/20/2018          INTERPRETATION:  History: Acute on chronic kidney disease on   hemodialysis. Oxygen desaturation.    Single view of the chest was performed.    Comparison is made to July 16, 2018.    Findings:    There is a right-sided hemodialysis catheter in place tip at cavoatrial   junction. The lungs are clear. There is moderate improvement of   previously noted pulmonary vascular congestion. Heart size within normal   limits. Patient is status post right rotator cuff repair.    Impression:    Improvement of previously noted pulmonary vascular congestion.    < end of copied text >  LUNG BASES: Small bilateral pleural effusions. Loculated fluid versus   mucoid impaction at the right lung base.    IMPRESSION:     Stable appearance of pancolitis consistent with pseudomembranous colitis.   No pneumatosis or free air.    Increasing moderate ascites.    Anasarca.    Urinary bladder cystitis. Correlate with UA.

## 2018-07-23 NOTE — PROGRESS NOTE ADULT - ASSESSMENT
84 y/o M w/pmhx of Afib, CHF, CAD s/p stents, COPD, PNA, upper GI bleed (duodenal)  BIB EMS from [facility] assisted living for fever, abd pain, and diarrhea that began 3 weeks ago. Was in the hospital for PNA tx and was later dx with C-diff and started on a course of PO vancomycin for 10 days for C-diff. Cardiology called for abnormal tele findings, abnormal EKG.     1. Mobitz II- EP consult appreciated. No recurrent events. He will need reevaluation for sleep apnea as outpt. Bradycardia thought to be vagally induced.   Now SR/Stach.    2. NSVT, bigeminy- keep K>4, Mag>2. Cont CCB for now. Cont to follow on tele. Echo with normal LV fxn, mod MR. Ectopy/PVCs on tele- cont current meds.    3. Renal  insufficiency - HD as per renal. S/p permacath placement yesterday.     4. Abdominal pain - Management pre primary team. Related to CDiff- cont abx. GI following.    5. Atrial fibrillation- off anticoagulation secondary to severe GI bleed in past requiring 15 units of pRBCs.  Now in SR. Currently on CCB/Bbl. HR well controlled in 60s-70s. Can change to long active cardizem upon discharge. Cont  Bbl as well.     6. DVT proph. Pain control with PRN dilaudid.    7. Sinus tachycardia- 110-120s. Cardizem restarted. HD today as well. Follow for now.     8. Awaiting paracentesis today.

## 2018-07-23 NOTE — PROGRESS NOTE ADULT - SUBJECTIVE AND OBJECTIVE BOX
Pt has been seen and examined with FP resident, resident supervised agree with a/p       Patient is a 83y old  Male who presents with a chief complaint of Fever/Diarrhea (20 Jul 2018 13:30)        PHYSICAL EXAM:    general- comfortable, chronically ill appearing    -rs-aeeb,cta  -cvs-s1s2 normal   -p/a-, soft, bs+    A/P    #Desaturation and acute hypoxic respiratory failure- most likely due to ascitis due to  hypoproteinemia   -s/p tap and removal of approximately 2 liter of fluids- pt is feeling much better   -incentive spirometry, out of bed to chair, d/c reglan     #Anasarca -maximum removal of fluid during hd     #Feeling of nausea -resolved and better with current regimen     #CT scan showing cystitis -? infectious or noninfectious- ID opinion, follow up requested

## 2018-07-23 NOTE — PROGRESS NOTE ADULT - ASSESSMENT
1) Clostridium Difficile Colitis clinically improved  2) Metabolic Acidosis  3) Restrictive Ventilatory Disease  4) SHAYY  5) Bilateral Pleural Effusions  6) Renal failure on dilaysis now  7) Leukocytosis is clinically improved  8)s/p shiley placed  9) increased ascites and small pleural effusions  10)Mucoid impaction seen in RLL cont to low recent O2 levels    84 y/o M w/pmhx of Afib, CHF, CAD s/p stents, COPD, PNA, upper GI bleed (duodenal)  BIB EMS from [facility] assisted living for fever, abd pain, and diarrhea that began 3 weeks ago. Was in the hospital for ESBL and was later dx with C-diff and started on a course of PO vancomycin for 10 days for the C-diff. At the present time has diffuse abdominal pain, being treated Vancomycin and IV Metronidazole  ABG reviewed and reveals 7.18/39/68, LA normal limits  Etiology likely from sepsis   repeat ABG noted  ABG - ( 09 Jul 2018 11:50 )  pH, Arterial: 7.21  pH, Blood: x     /  pCO2: 42    /  pO2: 67    / HCO3: 16    / Base Excess: -10.6 /  SaO2: 91      repeat cxr noted  At the present time, patient states he would like to be a full code  Continue monitoring hemodynamics (daughter states baseline diastolic tends to run between 25-40mmHg)   Monitor urine output aggressively   Used BIPAP/CPAP in the past (stopped as an outpatient after patient lost weight), may worsen intraabdominal pressures but if arrhythmias are present, may consider starting again.   Appreciate nephrology/cardiology/ID/GI/hospitalist recommendations  Will continue to monitor   hx of resp failure and intubation 2012 x 3-4 days  s/p Shiley and hemodialysis  still with abd distention but NGT is now discontinued and tolerated po yesterday  overall prognosis remains guarded  improving clinically with decreased leukocytosis  tolerating PO intake, monitor clinically  may need permannt cath placement tomorrow for dialysis  pulm status appears optimized for low risk procedure  fu cxr afterwards needed    hx SHAYY and treated with BIPAP in the past / stopped using it after wt loss  I was asked by Dr Tati Tomas to re eval for reported episodes of desaturations  DTR at bedside  pt's reported hypoxemic episodes after narcotics / while sleeping or having abd distention  he has known hx SHAYY / OHS and prior BIPAP 12/8 with O2 attached at home  this am after 35% VM was removed in his room, RA O2 sat 88%-93% range  will need supplemental O2 at night 3 liters nc as well as exertional need for O2 supplement as outpt  currently on 35% o2 with o2 sat 93%  will likley improve after paracentesis  increased ascites and small pleural effusions  Mucoid impaction seen in RLL cont to low recent O2 levels  plan for paracentesis today  Thank you for the consult

## 2018-07-24 LAB
ANION GAP SERPL CALC-SCNC: 9 MMOL/L — SIGNIFICANT CHANGE UP (ref 5–17)
BUN SERPL-MCNC: 29 MG/DL — HIGH (ref 7–23)
CALCIUM SERPL-MCNC: 7.4 MG/DL — LOW (ref 8.5–10.1)
CHLORIDE SERPL-SCNC: 106 MMOL/L — SIGNIFICANT CHANGE UP (ref 96–108)
CO2 SERPL-SCNC: 27 MMOL/L — SIGNIFICANT CHANGE UP (ref 22–31)
CREAT SERPL-MCNC: 3.46 MG/DL — HIGH (ref 0.5–1.3)
GLUCOSE SERPL-MCNC: 89 MG/DL — SIGNIFICANT CHANGE UP (ref 70–99)
HCT VFR BLD CALC: 25.3 % — LOW (ref 39–50)
HGB BLD-MCNC: 7.7 G/DL — LOW (ref 13–17)
MAGNESIUM SERPL-MCNC: 1.9 MG/DL — SIGNIFICANT CHANGE UP (ref 1.6–2.6)
MCHC RBC-ENTMCNC: 29.5 PG — SIGNIFICANT CHANGE UP (ref 27–34)
MCHC RBC-ENTMCNC: 30.4 GM/DL — LOW (ref 32–36)
MCV RBC AUTO: 96.9 FL — SIGNIFICANT CHANGE UP (ref 80–100)
NIGHT BLUE STAIN TISS: SIGNIFICANT CHANGE UP
NON-GYN CYTOLOGY SPEC: SIGNIFICANT CHANGE UP
NRBC # BLD: 0 /100 WBCS — SIGNIFICANT CHANGE UP (ref 0–0)
PLATELET # BLD AUTO: 203 K/UL — SIGNIFICANT CHANGE UP (ref 150–400)
POTASSIUM SERPL-MCNC: 3.6 MMOL/L — SIGNIFICANT CHANGE UP (ref 3.5–5.3)
POTASSIUM SERPL-SCNC: 3.6 MMOL/L — SIGNIFICANT CHANGE UP (ref 3.5–5.3)
RBC # BLD: 2.61 M/UL — LOW (ref 4.2–5.8)
RBC # FLD: 20.4 % — HIGH (ref 10.3–14.5)
SODIUM SERPL-SCNC: 142 MMOL/L — SIGNIFICANT CHANGE UP (ref 135–145)
SPECIMEN SOURCE: SIGNIFICANT CHANGE UP
WBC # BLD: 4.32 K/UL — SIGNIFICANT CHANGE UP (ref 3.8–10.5)
WBC # FLD AUTO: 4.32 K/UL — SIGNIFICANT CHANGE UP (ref 3.8–10.5)

## 2018-07-24 RX ORDER — METOCLOPRAMIDE HCL 10 MG
5 TABLET ORAL THREE TIMES A DAY
Qty: 0 | Refills: 0 | Status: DISCONTINUED | OUTPATIENT
Start: 2018-07-24 | End: 2018-07-26

## 2018-07-24 RX ADMIN — Medication 8 MILLIGRAM(S): at 23:26

## 2018-07-24 RX ADMIN — HEPARIN SODIUM 5000 UNIT(S): 5000 INJECTION INTRAVENOUS; SUBCUTANEOUS at 06:35

## 2018-07-24 RX ADMIN — Medication 5 MILLIGRAM(S): at 12:37

## 2018-07-24 RX ADMIN — Medication 0.5 MILLIGRAM(S): at 20:54

## 2018-07-24 RX ADMIN — PANTOPRAZOLE SODIUM 40 MILLIGRAM(S): 20 TABLET, DELAYED RELEASE ORAL at 06:37

## 2018-07-24 RX ADMIN — CHOLESTYRAMINE 4 GRAM(S): 4 POWDER, FOR SUSPENSION ORAL at 23:24

## 2018-07-24 RX ADMIN — HEPARIN SODIUM 5000 UNIT(S): 5000 INJECTION INTRAVENOUS; SUBCUTANEOUS at 18:38

## 2018-07-24 RX ADMIN — Medication 100 MILLIGRAM(S): at 12:36

## 2018-07-24 RX ADMIN — CHOLESTYRAMINE 4 GRAM(S): 4 POWDER, FOR SUSPENSION ORAL at 10:13

## 2018-07-24 RX ADMIN — Medication 500 MILLIGRAM(S): at 06:36

## 2018-07-24 RX ADMIN — Medication 500 MILLIGRAM(S): at 00:26

## 2018-07-24 RX ADMIN — Medication 0.5 MILLIGRAM(S): at 07:46

## 2018-07-24 RX ADMIN — PANTOPRAZOLE SODIUM 40 MILLIGRAM(S): 20 TABLET, DELAYED RELEASE ORAL at 18:38

## 2018-07-24 RX ADMIN — Medication 500 MILLIGRAM(S): at 12:35

## 2018-07-24 RX ADMIN — SIMVASTATIN 10 MILLIGRAM(S): 20 TABLET, FILM COATED ORAL at 23:31

## 2018-07-24 RX ADMIN — Medication 500 MILLIGRAM(S): at 18:39

## 2018-07-24 RX ADMIN — Medication 81 MILLIGRAM(S): at 12:36

## 2018-07-24 RX ADMIN — FINASTERIDE 5 MILLIGRAM(S): 5 TABLET, FILM COATED ORAL at 12:36

## 2018-07-24 NOTE — PROGRESS NOTE ADULT - ASSESSMENT
84 y/o wm with hx of ckd stage 3 ( scr 1.5-1.7) with ARYA due to volume depletion from CDiff associated colitis and diarrhea in presence of diuretic use PTA.  Now with non anion gap metabolic acidosis and worsening renal parameters.  CXR with mild CHF with hx of diastolic CHF.    PLAN  - obtain abd and lactate  - will change ivf and add bicarbonate and keep at current rate  - hold lasix done.   - fu uop and scr closely and will monitor respiratory status  - bp and hr stable now, cardizem adjusted and lopressor added    7/9 MK  - ARYA: worsening renal parameters with metabolic acidosis, non ag.  Previous lactate WNL and since placed on bicarbonate drip  fu repeat abg today.  Increase IVF rate  possibility of HD discussed with enio, hcp daughter, pt has been agreeable in past.   DC hydralazine  dc morphine and change to dilaudid  - Cdiff with rising scr and worsening abd distension: CRS consult ongoing  possible repeat ct scan  DW Dr ballesteros    7/10  Anuric  anasarca  Non anion gap acidosis on bicarb drip  ARYA, anuric  CKD 3 history  d/w Family, and pt agreeable  called ICU for line placement and to dialyze the pt in ICU for monitoring    7/10  HD initiated via R temp HD catheter  tolerating well  no complaints  good abf    7/11 SY  --ARYA with Anasarca.  Urine output slightly improved.  However, remains quite fluid overloaded.  Will plan to continue HD until fluid status is much improved.  HD order written.  3 hour tx today.  Will aim to remove 2.5 kg as tolerated.  --C DIff colitis ; continue to monitor closely.    7/12 SY  --ARYA with Anasarca.   Remains Oligo-anuric.    --HD with goal of 2.5 kg UF again today.  --C Diff colitis.   Clinically improved   Continue to monitor.    7/13  The patient was dialyzed 3 days in a row this week  Discussed with the patient's daughter and hospitalist, intensivist  Will skip dialysis today, no urgent need for dialysis  We'll dialyze the patient tomorrow on Saturday and then skip Sunday to dialyze the patient again on Monday.    7/14  We'll dialyze against 4 K bath  Case discussed with the patient's daughter  May need a permanent catheter by Monday      7/15  Dialyze with a 4K bath  Potassium is 3.3 most likely from the diarrhea  Schedule for permacath placement after dialysis on Monday or Tuesday  Patient is comfortable no complications noted at this time    7/16 MK  - ARYA/ CKD stage 3 remains oliguirc and HD dependent.  LImited UF at HD toleated with the   hypokalemia corrected with HD.  Permcath planned today  - Cdiff: on PO vanc.  improved leukocytosis and abd distension     7/17  s/p perm cath  HD tomorrow  4 K bath  replace K   overall stable    7/18 SY  --ARYA/CKD for now remaining dialysis dependent.  Continue TIW HD.  --C Diff colitis ; improving clinically.    7/19 SY  --ARYA/CKD : Continue TIW HD.   Will continue as outpatient for now as no signs of recovery at this point.  --C Diff colitis  : clinically improving.  Continue po abtx.  --D/c Planning  D/w pt's daughter re : rehab with HD.  --Replete K.  Continue regular diet without restrictions for now.    7/20 MK  - ARYA/CKD remains HD dependent with oliguria.  Will attempt UF with HD.  K correction with HD and changed to regular diet with fluid restriction  - Cdiff: PO vanc with improving renal paramenters-  - Episodes of Desat: will have pulmonary re-eval prior to dc.  - DC planning rehab with dialysis  dw Dr Hightower  will see pt    7/21 MK  - ARYA/CKD remains hd dependent. post hd cxr with improved PVC.  Still with episodes of desaturation and dr hightower input noted.  CT with evidence of ascites for paracentesis  - cdiff: on po vanc.  still with loose stools,     7/22 MK  - ARYA/CKD remains HD dependent.  Will coordinate AM HD based upon timing of paracentesis, hypokalemia improved  - hypoxia with ascites: for paracentesis   - cdif on po vanc, rectal tube in place    7/23 MK  - ARYA/CKD remains HD dependent, toelrating hd.  + inc uop   - hypoxia with ascites: s/p paracentesis today, monitor response to oxygenation  - AOCD; fu trend of h/h, on epogen  - cdiff: po vanc    7/24 SY  --ARYA/CKD  Urine output may be increasing.  Follow creat trend to determine further dialysis support.  --Post Paracentesis : improve resp status.  --Anemia : continue LETICIA.  --C Diff : continue po Vanco.

## 2018-07-24 NOTE — PROGRESS NOTE ADULT - ASSESSMENT
Pseudomembranous colitis status post recent antibiotics    By mouth vancomycin   case discussed with  family  improving  appetite may improve after DC flagyl, encourage PO intake        Patient had a recent course of C. difficile that was treated with vancomycin with a recurrence and he's had C. difficile in the past.  Due to severe C. difficile, would strongly consider a prolonged taper of vancomycin.  For now, would complete two-week course of vancomycin and then would taper her vancomycin over a long time.  Discussed with family.  Taper vancomycin, please dec vanco to 125 qid  Vancomycin 125 mg 4 times a day, one more week  Then 3 times a day for 2 weeks, then twice a day for one week, then daily for one week, then every other day for 1 week, then every 3rd day for 1 week.  Follow-up with me after that    start reglan due to N and possible gastroparesis  A fibrillation with rapid ventricular rhythm, status post Cardizem drip.    Would hold anticoagulation secondary to history bleeding    COPD obesity, obstructive sleep apnea, coronary artery disease, overall comorbid risk factors     IVF per renal  HD as needed

## 2018-07-24 NOTE — PROGRESS NOTE ADULT - ASSESSMENT
Pt is a 82 y/o M w/pmhx of Afib, CHF, CAD s/p stents, COPD, PNA, upper GI bleed (duodenal), PVD, s/p right lower extremity amputation,  recent hospitalization for pneumonia and subsequent Cdiff colitis admitted on 7/6 from assisted living for evaluation of persistent abdominal pain, fever and diarrhea that has been ongoing despite the po vancomycin course that the patient had been on. The patient also notes mid epigastric/chest pain. History per daughter at bedside and medical record as patient is poor historian.  1. Patient admitted with severe Cdiff pan colitis, also noted with leukocytosis most likely reactive to Cdiff infection  - follow up cultures   - iv hydration and supportive care   - serial cbc and monitor temperature   - day #16 vancomycin po 500 mg po q 6 hours  - would complete another 8 days po vancomycin  - completed 10 days iv flagyl  - tolerating antibiotics without rashes or side effects   - continue contact isolation  - limit antibiotics exposure  2. other issues: Afib, CHF, CAD s/p stents, COPD, PNA, upper GI bleed (duodenal), PVD, s/p right lower extremity amputation  - per medicine  3. Bladder wall thickening most likely due to the fact that patient now dialysis patient  - would avoid starting antibiotics in this patient with severe CDiff colitis  - monitor off antibiotics

## 2018-07-24 NOTE — PROGRESS NOTE ADULT - PROBLEM SELECTOR PLAN 2
- Resolved  - continue to maintain fluid balance with HD MWF   - control HFpEF with medical management   - O2 as needed  - Pulm appreciated, add nebs  - Incentive Spirometry  - CXR: improvement of Pulmonary Vascular Congestion  - CT Abdomen: Moderate Ascites - contributing to desaturation;   - S/P Paracentesis 7/23/18  - 3L NC O2 at night and on exertion as needed

## 2018-07-24 NOTE — PROGRESS NOTE ADULT - SUBJECTIVE AND OBJECTIVE BOX
82 y/o M w/pmhx of Afib, CHF, CAD s/p stents, COPD, recent PNA on abx, upper GI bleed (duodenal)  BIB EMS from [facility] for fever, abd pain, and diarrhea that began 3 weeks ago. Was in the hospital for PNA tx and was later dx with C-diff and started on a course of PO vancomycin for 10 days for the C-diff. States that he has had diarrhea since before the course of abx. Pain has been increased for the past few weeks and is characterized as a cramping feeling. Daughters also note that there is increased distension. Also notes chest pain characterized as a cramping in the central chest. 83% O2 sat noted morning of admission on 7/6/18, at the assisted living facility. Takes O2 routinely at night but not during the day.    7/24/18 - Patient seen and examined at bedside. Some no overnight events, hemodynamically stable, afebrile.  Rectal tube removed, diarrhea has slightly improved.  Tolerating Vanco PO well.  Breathing significantly improved since paracentesis Anticipating discharge tomorrow.    ICU Vital Signs Last 24 Hrs  T(C): 36.8 (24 Jul 2018 12:20), Max: 36.8 (24 Jul 2018 12:20)  T(F): 98.2 (24 Jul 2018 12:20), Max: 98.2 (24 Jul 2018 12:20)  HR: 78 (24 Jul 2018 12:20) (75 - 94)  BP: 119/41 (24 Jul 2018 12:20) (102/31 - 144/51)  BP(mean): 50 (23 Jul 2018 17:00) (50 - 72)  RR: 18 (24 Jul 2018 12:20) (17 - 21)  SpO2: 98% (24 Jul 2018 12:20) (88% - 100%)    PHYSICAL EXAM:  Constitutional: NAD  HEENT: PERR, EOMI, Normal Hearing, MMM  Neck: Soft and supple, No LAD, No JVD  Respiratory: Breath sounds are clear bilaterally, No wheezing, rales or rhonchi  Cardiovascular: S1 and S2, regular rate and rhythm, no Murmurs, gallops or rubs  Gastrointestinal: Bowel Sounds present, soft, nontender, Distended+, no rigidity, no rebound  Extremities: LLE lesion with dressings c/d/i, RLE s/p AKA +  Vascular: 2+ peripheral pulses  Neurological: A/O x 3, no focal deficits  Musculoskeletal: unable to assess  Skin: Incontinence associated Dermatitis with Rectal tube+ output green/loose stool    MEDICATIONS  (STANDING):  ALBUTerol/ipratropium for Nebulization. 3 milliLiter(s) Nebulizer once  allopurinol 100 milliGRAM(s) Oral daily  aspirin  chewable 81 milliGRAM(s) Oral daily  buDESOnide   0.5 milliGRAM(s) Respule 0.5 milliGRAM(s) Inhalation two times a day  cholestyramine Powder (Sugar-Free) 4 Gram(s) Oral two times a day  diltiazem    Tablet 30 milliGRAM(s) Oral every 6 hours  doxazosin 8 milliGRAM(s) Oral at bedtime  epoetin nelly Injectable 3000 Unit(s) IV Push <User Schedule>  epoetin nelly Injectable 4000 Unit(s) IV Push <User Schedule>  finasteride 5 milliGRAM(s) Oral daily  heparin  Injectable 5000 Unit(s) SubCutaneous every 12 hours  metoclopramide 5 milliGRAM(s) Oral three times a day  pantoprazole  Injectable 40 milliGRAM(s) IV Push every 12 hours  simvastatin 10 milliGRAM(s) Oral at bedtime  sodium ferric gluconate complex Injectable 125 milliGRAM(s) IV Push <User Schedule>  vancomycin    Solution 500 milliGRAM(s) Oral every 6 hours    MEDICATIONS  (PRN):  acetaminophen   Tablet 650 milliGRAM(s) Oral every 8 hours PRN For Temp greater than 38 C (100.4 F)  acetaminophen   Tablet. 650 milliGRAM(s) Oral every 8 hours PRN Mild Pain (1 - 3)  albumin human 25% IVPB 50 milliLiter(s) IV Intermittent <User Schedule> PRN sbp<=120mmhg  ALBUTerol   0.5% 2.5 milliGRAM(s) Nebulizer every 4 hours PRN Shortness of Breath and/or Wheezing  diphenhydrAMINE   Capsule 25 milliGRAM(s) Oral at bedtime PRN sleep  HYDROmorphone  Injectable 0.5 milliGRAM(s) IV Push every 6 hours PRN Severe Pain (7 - 10)  ondansetron Injectable 4 milliGRAM(s) IV Push every 6 hours PRN Nausea and/or Vomiting      LABS: All Labs Reviewed:               7.7    4.32  )-----------( 203      ( 24 Jul 2018 07:08 )             25.3     24 Jul 2018 07:08    142    |  106    |  29     ----------------------------<  89     3.6     |  27     |  3.46     Ca    7.4        24 Jul 2018 07:08  Phos  5.4       23 Jul 2018 06:00  Mg     1.9       24 Jul 2018 07:08    TPro  4.7    /  Alb  2.3    /  TBili  x      /  DBili  x      /  AST  x      /  ALT  x      /  AlkPhos  x      23 Jul 2018 12:45    LIVER FUNCTIONS - ( 23 Jul 2018 12:45 )  Alb: 2.3 g/dL / Pro: 4.7 gm/dL / ALK PHOS: x     / ALT: x     / AST: x     / GGT: x             Blood Culture:   Culture - Urine (07.10.18 @ 16:30)    Specimen Source: .Urine Catheterized    Culture Results:   <10,000 CFU/ml  Normal Urogenital ravinder present    Culture - Blood (07.06.18 @ 13:42)    Specimen Source: .Blood Blood-Peripheral    Culture Results:   No growth at 5 days.      RADIOLOGY/EKG:  < from: Xray Chest 1 View- PORTABLE-Urgent (07.16.18 @ 18:34) >  IMPRESSION:    Findings suggesting persistent mild CHF with mild pulmonary vascular   congestion and trace fluid in the right minor fissure.    Stable right CVC.    < end of copied text >    < from: Transthoracic Echocardiogram (07.10.18 @ 10:21) >   Impression     Summary     Fibrocalcific changes noted to the mitral valve leaflets with preserved   leaflet excursion.   Moderate (2+) mitral regurgitation is present.   The left atrium is moderately dilated.   Mild concentric left ventricular hypertrophy is present.   Estimated left ventricular ejection fraction is 55-60 %.    < end of copied text >    < from: CT Abdomen and Pelvis No Cont (07.21.18 @ 11:15) >  IMPRESSION:     Stable appearance of pancolitis consistent with pseudomembranous colitis.   No pneumatosis or free air.    Increasing moderate ascites.    < end of copied text >      DVT PPX: NO AC due to severe GI bleed in past

## 2018-07-24 NOTE — PROGRESS NOTE ADULT - ASSESSMENT
1) Clostridium Difficile Colitis clinically improved  2) Metabolic Acidosis  3) Restrictive Ventilatory Disease  4) SHAYY  5) Bilateral Pleural Effusions  6) Renal failure on dilaysis now  7) Leukocytosis is clinically improved  8)s/p shiley placed  9) increased ascites and small pleural effusions  10)Mucoid impaction seen in RLL cont to low recent O2 levels    82 y/o M w/pmhx of Afib, CHF, CAD s/p stents, COPD, PNA, upper GI bleed (duodenal)  BIB EMS from [facility] assisted living for fever, abd pain, and diarrhea that began 3 weeks ago. Was in the hospital for ESBL and was later dx with C-diff and started on a course of PO vancomycin for 10 days for the C-diff. At the present time has diffuse abdominal pain, being treated Vancomycin and IV Metronidazole  ABG reviewed and reveals 7.18/39/68, LA normal limits  Etiology likely from sepsis   repeat ABG noted  ABG - ( 09 Jul 2018 11:50 )  pH, Arterial: 7.21  pH, Blood: x     /  pCO2: 42    /  pO2: 67    / HCO3: 16    / Base Excess: -10.6 /  SaO2: 91      repeat cxr noted  At the present time, patient states he would like to be a full code  Continue monitoring hemodynamics (daughter states baseline diastolic tends to run between 25-40mmHg)   Monitor urine output aggressively   Used BIPAP/CPAP in the past (stopped as an outpatient after patient lost weight), may worsen intraabdominal pressures but if arrhythmias are present, may consider starting again.   Appreciate nephrology/cardiology/ID/GI/hospitalist recommendations  Will continue to monitor   hx of resp failure and intubation 2012 x 3-4 days  s/p Shiley and hemodialysis  still with abd distention but NGT is now discontinued and tolerated po yesterday  overall prognosis remains guarded  improving clinically with decreased leukocytosis  tolerating PO intake, monitor clinically  may need permannt cath placement tomorrow for dialysis  pulm status appears optimized for low risk procedure  fu cxr afterwards needed    hx SHAYY and treated with BIPAP in the past / stopped using it after wt loss  I was asked by Dr Tati Tomas to re eval for reported episodes of desaturations  pt's reported hypoxemic episodes after narcotics / while sleeping or having abd distention  he has known hx SHAYY / OHS and prior BIPAP 12/8 with O2 attached at home  will need supplemental O2 at night 3 liters nc as well as exertional need for O2 supplement as outpt  currently on o2 with o2 sat 96%  resp status improved after paracentesis  increased ascites and small pleural effusions improved after paracentesis  Mucoid impaction seen in RLL cont to low recent O2 levels    Thank you for the consult

## 2018-07-24 NOTE — PROGRESS NOTE ADULT - SUBJECTIVE AND OBJECTIVE BOX
Patient is a 83y old  Male who presents with a chief complaint of complain of diarrhea, fever (06 Jul 2018 23:55)      HPI:  Pt is a 84 y/o M w/pmhx of Afib, CHF, CAD s/p stents, COPD, PNA, upper GI bleed (duodenal)  BIB EMS from [facility] assisted living for fever, abd pain, and diarrhea that began 3 weeks ago. Was in the hospital for PNA tx and was later dx with C-diff and started on a course of PO vancomycin for 10 days for the C-diff. States that he has had diarrhea since before the course of abx. Pain has been increased for the past few weeks and is characterized as a cramping feeling. Daughters also note that there is increased distension. Also notes chest pain characterized as a cramping in the central chest. 83% O2 sat noted this morning at the assisted living facility. Takes O2 routinely at night but not during the day. (06 Jul 2018 23:55)  Patient's daughter states he was treated for ESBL with antibiotics prior to this  7/10  seen and examined with his dter at bedside  hx of resp failure and intubation 2012 x 3-4 days  feels ok with breathing now  being evaluated for surgical interventions with pancolitis  possibility of post op need for vent support discussed witrh pt and his dtr  he wants to proceed understanding high risk for surgery due to compromised medical condition  hx SHAYY and treated with BIPAP in the past / stopped using it after wt loss  7/11  seen in ICU with dtr at bedside  data discussed with critical care RN  s/p Ross and hemodialysis  still with abd distention  family wants to wait on abd surgery given he is high risk  7/12  family at bedside updated  no cp  no difficulty breathing  intermittent wheezing  7/13  NGT is discontinued  dtr at bedside updated  no issue with breathing now  7/14  having HD  some po intake tolerated ok  dtr at bedside updated  no active pulm issues  7/15  resting comfortably  no distress  Gi and renal eval noted  7/16  feels better  having dialysis  awake and alert  po intake tolerated well  7/17  dtr at bedside  s/lauren tate placed right chest  no cp    7/21  I was asked by Dr Tati Tomas to re eval for reported episodes of desaturations  DTR at bedside  data discussed at length  pt's reported hypoxemic episodes after narcotics / while sleeping or having abd distention  he has known hx SHAYY / OHS and prior BIPAP 12/8 with O2 attached at home  this am after 35% VM was removed in his room, RA O2 sat 88%-93% range  no distress  no significant cough  occasional wheeze    7/22  feels the same  family updated at bedside  reported plan for paracentesis in am  vomited once  7/23  data rev with critical care RN at bedside this am  overnight desat required 35% VM, o2 sat this am 93%  no distress  sleeping  plan for paracentesis   7/24  seen on 5 [place] today  feels better  no issues with his breathing  s/p paracentesis  PAST MEDICAL & SURGICAL HISTORY:  Diastolic CHF  Peripheral vascular disease  Afib  Anemia  CKD (chronic kidney disease)  COPD (chronic obstructive pulmonary disease)  SHAYY (obstructive sleep apnea)  Sepsis, due to unspecified organism: 2/2 poorly healing wounds b/l  Dyspepsia: On moderate exertion.  Sleep apnea, obstructive: Requires home 02 therapy, and treatment with BIPAP  Atelectasis  Pleural effusion, bilateral  Respiratory failure  Peripheral edema  CRI (chronic renal insufficiency)  Gout  Benign prostatic hypertrophy  Spinal stenosis  Hypercholesterolemia  GERD (gastroesophageal reflux disease)  CAD (coronary artery disease)  Hypertension  S/P angioplasty with stent  Cataract of left eye  Prostate: Surgery green light procedure.  S/P rotator cuff surgery: Right  S/P angioplasty    MEDICATIONS  (STANDING):  ALBUTerol/ipratropium for Nebulization. 3 milliLiter(s) Nebulizer once  allopurinol 100 milliGRAM(s) Oral daily  aspirin  chewable 81 milliGRAM(s) Oral daily  buDESOnide   0.5 milliGRAM(s) Respule 0.5 milliGRAM(s) Inhalation two times a day  cholestyramine Powder (Sugar-Free) 4 Gram(s) Oral two times a day  diltiazem    Tablet 30 milliGRAM(s) Oral every 6 hours  doxazosin 8 milliGRAM(s) Oral at bedtime  epoetin nelly Injectable 3000 Unit(s) IV Push <User Schedule>  epoetin nelly Injectable 4000 Unit(s) IV Push <User Schedule>  finasteride 5 milliGRAM(s) Oral daily  heparin  Injectable 5000 Unit(s) SubCutaneous every 12 hours  pantoprazole  Injectable 40 milliGRAM(s) IV Push every 12 hours  simvastatin 10 milliGRAM(s) Oral at bedtime  sodium ferric gluconate complex Injectable 125 milliGRAM(s) IV Push <User Schedule>  vancomycin    Solution 500 milliGRAM(s) Oral every 6 hours          MEDICATIONS  (PRN):  acetaminophen   Tablet 650 milliGRAM(s) Oral every 8 hours PRN For Temp greater than 38 C (100.4 F)  acetaminophen   Tablet. 650 milliGRAM(s) Oral every 8 hours PRN Mild Pain (1 - 3)  ALBUTerol   0.5% 2.5 milliGRAM(s) Nebulizer every 4 hours PRN Shortness of Breath and/or Wheezing  morphine  - Injectable 2 milliGRAM(s) IV Push every 6 hours PRN Severe Pain (7 - 10)  ondansetron Injectable 4 milliGRAM(s) IV Push every 6 hours PRN Nausea and/or Vomiting      FAMILY HISTORY:  No pertinent family history in first degree relatives    REVIEW OF SYSTEM:  abdominal pain, otherwise 12 point ROS negative     Vital Signs Last 24 Hrs  T(C): 36.5 (24 Jul 2018 05:13), Max: 36.6 (23 Jul 2018 10:46)  T(F): 97.7 (24 Jul 2018 05:13), Max: 97.8 (23 Jul 2018 10:46)  HR: 75 (24 Jul 2018 05:13) (71 - 120)  BP: 102/31 (24 Jul 2018 05:13) (102/31 - 144/51)  BP(mean): 50 (23 Jul 2018 17:00) (50 - 72)  RR: 18 (24 Jul 2018 05:13) (17 - 25)  SpO2: 93% (24 Jul 2018 05:13) (88% - 100%)  PHYSICAL EXAM  General Appearance: cooperative, no acute distress,   HEENT: PERRL, conjunctiva clear, EOM's intact, non injected pharynx, no exudate, TM   normal  Neck: Supple, , no adenopathy, thyroid: not enlarged, no carotid bruit or JVD  Back: Symmetric, no  tenderness,no soft tissue tenderness  Lungs:IMPROVED Diminished bilaterally R>L with some dullness to percussion  Heart: Regular rate and rhythm, S1, S2 normal, no murmur, rub or gallop  Abdomen:  decreased-tender,no active bowel sounds, Distended , no hepatosplenomegaly  Extremities: pos peripheral edema / Hx Right leg amputation  Skin: Skin color, texture normal, no rashes   Neurologic: Alert and oriented X3 , cranial nerves intact, sensory and motor normal,    ECG:    LABS:                        7.7    4.32  )-----------( 203      ( 24 Jul 2018 07:08 )             25.3   07-24    142  |  106  |  29<H>  ----------------------------<  89  3.6   |  27  |  3.46<H>    Ca    7.4<L>      24 Jul 2018 07:08  Phos  5.4     07-23  Mg     1.9     07-24    TPro  4.7<L>  /  Alb  2.3<L>  /  TBili  x   /  DBili  x   /  AST  x   /  ALT  x   /  AlkPhos  x   07-23                                   8.0    6.95  )-----------( 247      ( 22 Jul 2018 06:19 )             26.7   07-22    144  |  111<H>  |  33<H>  ----------------------------<  104<H>  4.1   |  25  |  4.10<H>    Ca    7.3<L>      22 Jul 2018 06:19  Mg     1.8     07-22    TPro  4.4<L>  /  Alb  2.1<L>  /  TBili  0.4  /  DBili  x   /  AST  7<L>  /  ALT  <6<L>  /  AlkPhos  48  07-22               7.5    9.09  )-----------( 225      ( 21 Jul 2018 06:08 )             24.5   07-21    139  |  107  |  24<H>  ----------------------------<  101<H>  3.2<L>   |  28  |  3.31<H>    Ca    7.5<L>      21 Jul 2018 06:08  Phos  4.1     07-20  Mg     1.9     07-21    TPro  x   /  Alb  2.1<L>  /  TBili  x   /  DBili  x   /  AST  x   /  ALT  x   /  AlkPhos  x   07-20                                   8.4    10.13 )-----------( 199      ( 17 Jul 2018 05:41 )             26.8   07-17    142  |  108  |  19  ----------------------------<  89  3.4<L>   |  25  |  3.02<H>    Ca    7.2<L>      17 Jul 2018 05:41                 8.4    8.91  )-----------( 181      ( 15 Jul 2018 06:16 )             26.4   07-15    142  |  107  |  23  ----------------------------<  101<H>  3.3<L>   |  26  |  3.04<H>    Ca    7.5<L>      15 Jul 2018 06:16                            8.2    10.15 )-----------( 183      ( 13 Jul 2018 05:14 )             26.1   07-13    143  |  107  |  26<H>  ----------------------------<  89  3.5   |  26  |  2.62<H>    Ca    7.2<L>      13 Jul 2018 05:14  Phos  4.3     07-12  Mg     2.0     07-12    TPro  x   /  Alb  2.0<L>  /  TBili  x   /  DBili  x   /  AST  x   /  ALT  x   /  AlkPhos  x   07-12                            8.3    10.62 )-----------( 202      ( 12 Jul 2018 05:17 )             26.8   07-12    141  |  107  |  33<H>  ----------------------------<  66<L>  3.9   |  23  |  2.84<H>    Ca    7.4<L>      12 Jul 2018 05:17  Phos  4.3     07-12  Mg     2.0     07-12    TPro  x   /  Alb  2.0<L>  /  TBili  x   /  DBili  x   /  AST  x   /  ALT  x   /  AlkPhos  x   07-12                          8.8    20.36 )-----------( 265      ( 10 Jul 2018 05:47 )             28.0   07-11    142  |  108  |  52<H>  ----------------------------<  80  4.0   |  23  |  3.82<H>    Ca    7.0<L>      11 Jul 2018 06:20  Phos  4.9     07-11  Mg     1.9     07-11                              8.1    20.78 )-----------( 239      ( 09 Jul 2018 05:48 )             25.6     07-09    144  |  114<H>  |  65<H>  ----------------------------<  113<H>  4.5   |  17<L>  |  3.90<H>    Ca    7.1<L>      09 Jul 2018 05:48  Mg     1.9     07-09      CARDIAC MARKERS ( 07 Jul 2018 17:51 )  0.024 ng/mL / x     / x     / x     / x                  ABG - ( 08 Jul 2018 15:47 )  pH, Arterial: 7.18  pH, Blood: x     /  pCO2: 39    /  pO2: 68    / HCO3: 14    / Base Excess: -13.1 /  SaO2: 92            < from: Xray Chest 1 View-PORTABLE IMMEDIATE (07.10.18 @ 15:50) >  INTERPRETATION:  CLINICAL INDICATION:  R  I J dialysis line placement    TECHNIQUE: Single view AP chest radiograph was performed.    COMPARISON:  Chest radiograph performed earlier on the same day,   7/10/2018 at 8:28 AM and chest radiograph dated 7/6/2018.    FINDINGS:    Interval placement of right-sided jugular central venous catheter with   tip projecting over the right atrium. No evidence forpneumothorax.    There is persistent mild pulmonary vascular congestion. There is trace   fluid within the right minor fissure.    The cardiac silhouette size is stable. Diffuse aortic calcifications are   noted    Redemonstration of anchor screw within the right humeral head.     IMPRESSION:    Interval placement of right-sided IJ CVC, with tip projecting over the   right atrium. No pneumothorax.     < end of copied text >          RADIOLOGY & ADDITIONAL STUDIES:  IMPRESSION:     Severe pancolitis consistent with pseudomembranous colitis.    Mild pulmonary edema.    Small loculated right and trace layering left pleural effusions.    < from: Xray Chest 1 View- PORTABLE-Routine (07.20.18 @ 15:52) >    PROCEDURE DATE:  07/20/2018          INTERPRETATION:  History: Acute on chronic kidney disease on   hemodialysis. Oxygen desaturation.    Single view of the chest was performed.    Comparison is made to July 16, 2018.    Findings:    There is a right-sided hemodialysis catheter in place tip at cavoatrial   junction. The lungs are clear. There is moderate improvement of   previously noted pulmonary vascular congestion. Heart size within normal   limits. Patient is status post right rotator cuff repair.    Impression:    Improvement of previously noted pulmonary vascular congestion.    < end of copied text >  LUNG BASES: Small bilateral pleural effusions. Loculated fluid versus   mucoid impaction at the right lung base.    IMPRESSION:     Stable appearance of pancolitis consistent with pseudomembranous colitis.   No pneumatosis or free air.    Increasing moderate ascites.    Anasarca.    Urinary bladder cystitis. Correlate with UA.

## 2018-07-24 NOTE — PROGRESS NOTE ADULT - SUBJECTIVE AND OBJECTIVE BOX
NEPHROLOGY INTERVAL HPI/OVERNIGHT EVENTS:   SY  Post HD yesterday.  post Paracentesis.     Reports feeling much better.  Reports increased urine output.      MEDICATIONS  (STANDING):  ALBUTerol/ipratropium for Nebulization. 3 milliLiter(s) Nebulizer once  allopurinol 100 milliGRAM(s) Oral daily  aspirin  chewable 81 milliGRAM(s) Oral daily  buDESOnide   0.5 milliGRAM(s) Respule 0.5 milliGRAM(s) Inhalation two times a day  cholestyramine Powder (Sugar-Free) 4 Gram(s) Oral two times a day  diltiazem    Tablet 30 milliGRAM(s) Oral every 6 hours  doxazosin 8 milliGRAM(s) Oral at bedtime  epoetin nelly Injectable 3000 Unit(s) IV Push <User Schedule>  epoetin nelly Injectable 4000 Unit(s) IV Push <User Schedule>  finasteride 5 milliGRAM(s) Oral daily  heparin  Injectable 5000 Unit(s) SubCutaneous every 12 hours  metoclopramide 5 milliGRAM(s) Oral three times a day  pantoprazole  Injectable 40 milliGRAM(s) IV Push every 12 hours  simvastatin 10 milliGRAM(s) Oral at bedtime  sodium ferric gluconate complex Injectable 125 milliGRAM(s) IV Push <User Schedule>  vancomycin    Solution 500 milliGRAM(s) Oral every 6 hours    MEDICATIONS  (PRN):  acetaminophen   Tablet 650 milliGRAM(s) Oral every 8 hours PRN For Temp greater than 38 C (100.4 F)  acetaminophen   Tablet. 650 milliGRAM(s) Oral every 8 hours PRN Mild Pain (1 - 3)  albumin human 25% IVPB 50 milliLiter(s) IV Intermittent <User Schedule> PRN sbp<=120mmhg  ALBUTerol   0.5% 2.5 milliGRAM(s) Nebulizer every 4 hours PRN Shortness of Breath and/or Wheezing  diphenhydrAMINE   Capsule 25 milliGRAM(s) Oral at bedtime PRN sleep  HYDROmorphone  Injectable 0.5 milliGRAM(s) IV Push every 6 hours PRN Severe Pain (7 - 10)  ondansetron Injectable 4 milliGRAM(s) IV Push every 6 hours PRN Nausea and/or Vomiting          Vital Signs Last 24 Hrs  T(C): 36.5 (2018 05:13), Max: 36.5 (2018 22:30)  T(F): 97.7 (2018 05:13), Max: 97.7 (2018 22:30)  HR: 75 (2018 05:13) (75 - 120)  BP: 102/31 (2018 05:13) (102/31 - 144/51)  BP(mean): 50 (2018 17:00) (50 - 72)  RR: 18 (2018 05:13) (17 - 22)  SpO2: 93% (2018 05:13) (88% - 100%)  Daily     Daily Weight in k.7 (2018 07:54)     @ 07:01  -   @ 07:00  --------------------------------------------------------  IN: 750 mL / OUT: 0 mL / NET: 750 mL        PHYSICAL EXAM:  Alert and appropriate  GENERAL: WNL  CHEST/LUNG: Decreased BS at bases.  HEART: S1S2 RRR  ABDOMEN: soft  EXTREMITIES: improved edema  SKIN:     LABS:                        7.7    4.32  )-----------( 203      ( 2018 07:08 )             25.3         142  |  106  |  29<H>  ----------------------------<  89  3.6   |  27  |  3.46<H>    Ca    7.4<L>      2018 07:08  Phos  5.4       Mg     1.9         TPro  4.7<L>  /  Alb  2.3<L>  /  TBili  x   /  DBili  x   /  AST  x   /  ALT  x   /  AlkPhos  x           Magnesium, Serum: 1.9 mg/dL ( @ 07:08)          RADIOLOGY & ADDITIONAL TESTS:

## 2018-07-24 NOTE — PROGRESS NOTE ADULT - SUBJECTIVE AND OBJECTIVE BOX
Patient is a 83y old  Male who presents with a chief complaint of Fever/Diarrhea (20 Jul 2018 13:30)      HPI:  Pt is a 82 y/o M w/pmhx of Afib, CHF, CAD s/p stents, COPD, PNA, upper GI bleed (duodenal)  BIB EMS from [facility] assisted living for fever, abd pain, and diarrhea that began 3 weeks ago. Was in the hospital for PNA tx and was later dx with C-diff and started on a course of PO vancomycin for 10 days for the C-diff. States that he has had diarrhea since before the course of abx. Pain has been increased for the past few weeks and is characterized as a cramping feeling. Daughters also note that there is increased distension. Also notes chest pain characterized as a cramping in the central chest. 83% O2 sat noted this morning at the assisted living facility. Takes O2 routinely at night but not during the day. (06 Jul 2018 23:55)    Diarrhea, and nondistended drainage to continue but improved. Drainage tube was maintained in place secondary to perirectal inflammatory changes. However, suggested discontinuing 2.  Had some mild diffuse abdominal discomfort and nausea since after eating.        PAST MEDICAL & SURGICAL HISTORY:  Diastolic CHF  Peripheral vascular disease  Afib  Anemia  CKD (chronic kidney disease)  COPD (chronic obstructive pulmonary disease)  SHAYY (obstructive sleep apnea)  Sepsis, due to unspecified organism: 2/2 poorly healing wounds b/l  Dyspepsia: On moderate exertion.  Sleep apnea, obstructive: Requires home 02 therapy, and treatment with BIPAP  Atelectasis  Pleural effusion, bilateral  Respiratory failure  Peripheral edema  CRI (chronic renal insufficiency)  Gout  Benign prostatic hypertrophy  Spinal stenosis  Hypercholesterolemia  GERD (gastroesophageal reflux disease)  CAD (coronary artery disease)  Hypertension  S/P angioplasty with stent  Cataract of left eye  Prostate: Surgery green light procedure.  S/P rotator cuff surgery: Right  S/P angioplasty      MEDICATIONS  (STANDING):  ALBUTerol/ipratropium for Nebulization. 3 milliLiter(s) Nebulizer once  allopurinol 100 milliGRAM(s) Oral daily  aspirin  chewable 81 milliGRAM(s) Oral daily  buDESOnide   0.5 milliGRAM(s) Respule 0.5 milliGRAM(s) Inhalation two times a day  cholestyramine Powder (Sugar-Free) 4 Gram(s) Oral two times a day  diltiazem    Tablet 30 milliGRAM(s) Oral every 6 hours  doxazosin 8 milliGRAM(s) Oral at bedtime  epoetin nelly Injectable 3000 Unit(s) IV Push <User Schedule>  epoetin nelly Injectable 4000 Unit(s) IV Push <User Schedule>  finasteride 5 milliGRAM(s) Oral daily  heparin  Injectable 5000 Unit(s) SubCutaneous every 12 hours  metoclopramide 5 milliGRAM(s) Oral three times a day  pantoprazole  Injectable 40 milliGRAM(s) IV Push every 12 hours  simvastatin 10 milliGRAM(s) Oral at bedtime  sodium ferric gluconate complex Injectable 125 milliGRAM(s) IV Push <User Schedule>  vancomycin    Solution 500 milliGRAM(s) Oral every 6 hours    MEDICATIONS  (PRN):  acetaminophen   Tablet 650 milliGRAM(s) Oral every 8 hours PRN For Temp greater than 38 C (100.4 F)  acetaminophen   Tablet. 650 milliGRAM(s) Oral every 8 hours PRN Mild Pain (1 - 3)  albumin human 25% IVPB 50 milliLiter(s) IV Intermittent <User Schedule> PRN sbp<=120mmhg  ALBUTerol   0.5% 2.5 milliGRAM(s) Nebulizer every 4 hours PRN Shortness of Breath and/or Wheezing  diphenhydrAMINE   Capsule 25 milliGRAM(s) Oral at bedtime PRN sleep  HYDROmorphone  Injectable 0.5 milliGRAM(s) IV Push every 6 hours PRN Severe Pain (7 - 10)  ondansetron Injectable 4 milliGRAM(s) IV Push every 6 hours PRN Nausea and/or Vomiting      Allergies    Zosyn (Rash)    Intolerances        SOCIAL HISTORY:NC    FAMILY HISTORY:  No pertinent family history in first degree relatives      REVIEW OF SYSTEMS:    CONSTITUTIONAL: No weakness, fevers or chills  EYES/ENT: No visual changes;  No vertigo or throat pain   NECK: No pain or stiffness  RESPIRATORY: No cough, wheezing, hemoptysis; No shortness of breath  CARDIOVASCULAR: No chest pain or palpitations  GENITOURINARY: No dysuria, frequency or hematuria  NEUROLOGICAL: No numbness or weakness  SKIN: No itching, burning, rashes, or lesions   All other review of systems is negative unless indicated above.    Vital Signs Last 24 Hrs  T(C): 36.5 (24 Jul 2018 05:13), Max: 36.6 (23 Jul 2018 10:46)  T(F): 97.7 (24 Jul 2018 05:13), Max: 97.8 (23 Jul 2018 10:46)  HR: 75 (24 Jul 2018 05:13) (71 - 120)  BP: 102/31 (24 Jul 2018 05:13) (102/31 - 144/51)  BP(mean): 50 (23 Jul 2018 17:00) (50 - 72)  RR: 18 (24 Jul 2018 05:13) (17 - 25)  SpO2: 93% (24 Jul 2018 05:13) (88% - 100%)    PHYSICAL EXAM:    Constitutional: NAD, well-developed  HEENT: EOMI, throat clear  Neck: No LAD, supple  Respiratory: CTA and P  Cardiovascular: S1 and S2, RRR, no M  Gastrointestinal: BS+, soft, mild diffuse tender/ND, neg HSM,  Extremities: No peripheral edema, neg clubing, cyanosis    Neurological: A/O x 3, no focal deficits  Psychiatric: Normal mood, normal affect  Skin: No rashes    LABS:  CBC Full  -  ( 24 Jul 2018 07:08 )  WBC Count : 4.32 K/uL  Hemoglobin : 7.7 g/dL  Hematocrit : 25.3 %  Platelet Count - Automated : 203 K/uL  Mean Cell Volume : 96.9 fl  Mean Cell Hemoglobin : 29.5 pg  Mean Cell Hemoglobin Concentration : 30.4 gm/dL  Auto Neutrophil # : x  Auto Lymphocyte # : x  Auto Monocyte # : x  Auto Eosinophil # : x  Auto Basophil # : x  Auto Neutrophil % : x  Auto Lymphocyte % : x  Auto Monocyte % : x  Auto Eosinophil % : x  Auto Basophil % : x    07-24    142  |  106  |  29<H>  ----------------------------<  89  3.6   |  27  |  3.46<H>    Ca    7.4<L>      24 Jul 2018 07:08  Phos  5.4     07-23  Mg     1.9     07-24    TPro  4.7<L>  /  Alb  2.3<L>  /  TBili  x   /  DBili  x   /  AST  x   /  ALT  x   /  AlkPhos  x   07-23        07-10 @ 16:30  Hep A Igm Nonreact  Hep A total ab, IgA and M --  Hep B core Ab total --  Hep B core IgM Nonreact  Hep B DNA PCR --  Hep BSAg --  Hep BSAb --  Hep BSAb --  HCV Ab --  HCV RNA Log --  HCV RNA interp --                RADIOLOGY & ADDITIONAL STUDIES:

## 2018-07-24 NOTE — PROGRESS NOTE ADULT - PROBLEM SELECTOR PLAN 1
- cont Vancomycin 500mg PO q6h  - s/p Flagyl 10 days  - ID appreciated, continue PO vancomycin  - GI appreciated, taper regimen structured  - Colorectal Consult appreciated, would not consider intervention at this time

## 2018-07-24 NOTE — PROGRESS NOTE ADULT - SUBJECTIVE AND OBJECTIVE BOX
Patient is a 83y old  Male who presents with a chief complaint of complain of diarrhea, fever (06 Jul 2018 23:55)  Date of service: 07-24-18 @ 17:23    Patient sitting in chair  No complaints  No abdominal Pain  Had paracentesis with good relief        ROS: no fever or chills; denies dizziness, no HA, no SOB or cough, no abdominal pain, no diarrhea or constipation; no dysuria, no urinary frequency, no legs pain, no rashes    MEDICATIONS  (STANDING):  ALBUTerol/ipratropium for Nebulization. 3 milliLiter(s) Nebulizer once  allopurinol 100 milliGRAM(s) Oral daily  aspirin  chewable 81 milliGRAM(s) Oral daily  buDESOnide   0.5 milliGRAM(s) Respule 0.5 milliGRAM(s) Inhalation two times a day  cholestyramine Powder (Sugar-Free) 4 Gram(s) Oral two times a day  diltiazem    Tablet 30 milliGRAM(s) Oral every 6 hours  doxazosin 8 milliGRAM(s) Oral at bedtime  epoetin nelly Injectable 3000 Unit(s) IV Push <User Schedule>  epoetin nelly Injectable 4000 Unit(s) IV Push <User Schedule>  finasteride 5 milliGRAM(s) Oral daily  heparin  Injectable 5000 Unit(s) SubCutaneous every 12 hours  metoclopramide 5 milliGRAM(s) Oral three times a day  pantoprazole  Injectable 40 milliGRAM(s) IV Push every 12 hours  simvastatin 10 milliGRAM(s) Oral at bedtime  sodium ferric gluconate complex Injectable 125 milliGRAM(s) IV Push <User Schedule>  vancomycin    Solution 500 milliGRAM(s) Oral every 6 hours    MEDICATIONS  (PRN):  acetaminophen   Tablet 650 milliGRAM(s) Oral every 8 hours PRN For Temp greater than 38 C (100.4 F)  acetaminophen   Tablet. 650 milliGRAM(s) Oral every 8 hours PRN Mild Pain (1 - 3)  albumin human 25% IVPB 50 milliLiter(s) IV Intermittent <User Schedule> PRN sbp<=120mmhg  ALBUTerol   0.5% 2.5 milliGRAM(s) Nebulizer every 4 hours PRN Shortness of Breath and/or Wheezing  diphenhydrAMINE   Capsule 25 milliGRAM(s) Oral at bedtime PRN sleep  HYDROmorphone  Injectable 0.5 milliGRAM(s) IV Push every 6 hours PRN Severe Pain (7 - 10)  ondansetron Injectable 4 milliGRAM(s) IV Push every 6 hours PRN Nausea and/or Vomiting      Vital Signs Last 24 Hrs  T(C): 36.8 (24 Jul 2018 17:15), Max: 36.8 (24 Jul 2018 12:20)  T(F): 98.2 (24 Jul 2018 17:15), Max: 98.2 (24 Jul 2018 12:20)  HR: 71 (24 Jul 2018 17:15) (71 - 81)  BP: 112/39 (24 Jul 2018 17:15) (102/31 - 119/41)  BP(mean): --  RR: 18 (24 Jul 2018 17:15) (18 - 18)  SpO2: 99% (24 Jul 2018 17:15) (93% - 100%)    Physical Exam:            PE:    Constitutional: frail looking  HEENT: NC/AT, EOMI, PERRLA, conjunctivae clear; ears and nose atraumatic; pharynx clear  Neck: supple; thyroid not palpable  Respiratory: respiratory effort normal; clear to auscultation  Cardiovascular: S1S2 regular, no murmurs  Abdomen: soft, non tender, difusely distended, positive BS; no liver or spleen organomegaly  Genitourinary: no suprapubic tenderness  Musculoskeletal: s/p right lower extremity amputation; left lower extremity with dressing in place  Neurological/ Psychiatric: AxOx3, judgement and insight normal;  moving all extremities  Skin: no rashes; no palpable lesions    Labs: all available labs reviewed                        Labs:                         Labs:                         Labs:                      Labs:           Labs:                        7.7    4.32  )-----------( 203      ( 24 Jul 2018 07:08 )             25.3     07-24    142  |  106  |  29<H>  ----------------------------<  89  3.6   |  27  |  3.46<H>    Ca    7.4<L>      24 Jul 2018 07:08  Phos  5.4     07-23  Mg     1.9     07-24    TPro  4.7<L>  /  Alb  2.3<L>  /  TBili  x   /  DBili  x   /  AST  x   /  ALT  x   /  AlkPhos  x   07-23           Cultures:       Culture - Fungal, Body Fluid (collected 07-23-18 @ 09:00)  Source: Peritoneal Peritoneal Fluid  Preliminary Report (07-24-18 @ 07:53):    Testing in progress    Culture - Body Fluid with Gram Stain (collected 07-23-18 @ 09:00)  Source: Peritoneal Peritoneal Fluid  Gram Stain (07-23-18 @ 21:41):    polymorphonuclear leukocytes seen    No organisms seen    by cytocentrifuge    Culture - Acid Fast - Body Fluid w/Smear (collected 07-23-18 @ 09:00)  Source: Peritoneal Peritoneal Fluid                   Clostridium difficile Toxin by PCR (07.06.18 @ 16:19)    C Diff by PCR Result: Detected    Clostridium difficile Toxin by PCR: The results of this test should be interpreted with consideration of all  clinical and laboratory findings. This test determines the presence of  the C. difficile tcdB gene at a given time and is not intended to  identify antibiotic associated disease or C. difficile infection without  clinical context. Successful treatment is based on the resolution of  clinical symptoms. This test should not be used as a "test of cure"  because C. difficile DNA will persist after successful treatment. Repeat  testing will not be permitted.    This test is performed on the BD MAX system using Real-Time PCR and  fluorogenic target-specific hybridization.        < from: CT Abdomen and Pelvis w/ Oral Cont (07.06.18 @ 18:03) >    IMPRESSION:     Severe pancolitis consistent with pseudomembranous colitis.    Mild pulmonary edema.    Small loculated right and trace layering left pleural effusions.      < end of copied text >    < from: CT Abdomen and Pelvis No Cont (07.21.18 @ 11:15) >    IMPRESSION:     Stable appearance of pancolitis consistent with pseudomembranous colitis.   No pneumatosis or free air.    Increasing moderate ascites.    Anasarca.    Urinary bladder cystitis. Correlate with UA.      < end of copied text >        Radiology: all available radiological tests reviewed    Advanced directives addressed: full resuscitation

## 2018-07-24 NOTE — PROGRESS NOTE ADULT - ASSESSMENT
83y M with Sepsis 2/2 severe and recurrent Pseudomembranous Colitis, developed Renal Insufficiency HD dependent, and Acute Hypoxic Respiratory Failure 2/2 Fluid Overload from RF and HFpEF    D/C Planning [facility] with HD

## 2018-07-25 LAB
ANION GAP SERPL CALC-SCNC: 9 MMOL/L — SIGNIFICANT CHANGE UP (ref 5–17)
BUN SERPL-MCNC: 43 MG/DL — HIGH (ref 7–23)
CALCIUM SERPL-MCNC: 7.6 MG/DL — LOW (ref 8.5–10.1)
CHLORIDE SERPL-SCNC: 106 MMOL/L — SIGNIFICANT CHANGE UP (ref 96–108)
CO2 SERPL-SCNC: 26 MMOL/L — SIGNIFICANT CHANGE UP (ref 22–31)
CREAT SERPL-MCNC: 4.31 MG/DL — HIGH (ref 0.5–1.3)
GLUCOSE SERPL-MCNC: 81 MG/DL — SIGNIFICANT CHANGE UP (ref 70–99)
HCT VFR BLD CALC: 26 % — LOW (ref 39–50)
HGB BLD-MCNC: 7.9 G/DL — LOW (ref 13–17)
LDH SERPL L TO P-CCNC: 83 U/L — SIGNIFICANT CHANGE UP
MAGNESIUM SERPL-MCNC: 1.9 MG/DL — SIGNIFICANT CHANGE UP (ref 1.6–2.6)
MCHC RBC-ENTMCNC: 28.9 PG — SIGNIFICANT CHANGE UP (ref 27–34)
MCHC RBC-ENTMCNC: 30.4 GM/DL — LOW (ref 32–36)
MCV RBC AUTO: 95.2 FL — SIGNIFICANT CHANGE UP (ref 80–100)
NRBC # BLD: 0 /100 WBCS — SIGNIFICANT CHANGE UP (ref 0–0)
PLATELET # BLD AUTO: 205 K/UL — SIGNIFICANT CHANGE UP (ref 150–400)
POTASSIUM SERPL-MCNC: 3.5 MMOL/L — SIGNIFICANT CHANGE UP (ref 3.5–5.3)
POTASSIUM SERPL-SCNC: 3.5 MMOL/L — SIGNIFICANT CHANGE UP (ref 3.5–5.3)
PROT FLD-MCNC: 2.8 G/DL — SIGNIFICANT CHANGE UP
RBC # BLD: 2.73 M/UL — LOW (ref 4.2–5.8)
RBC # FLD: 19.9 % — HIGH (ref 10.3–14.5)
SODIUM SERPL-SCNC: 141 MMOL/L — SIGNIFICANT CHANGE UP (ref 135–145)
SPECIMEN SOURCE FLD: SIGNIFICANT CHANGE UP
SPECIMEN SOURCE FLD: SIGNIFICANT CHANGE UP
WBC # BLD: 4.02 K/UL — SIGNIFICANT CHANGE UP (ref 3.8–10.5)
WBC # FLD AUTO: 4.02 K/UL — SIGNIFICANT CHANGE UP (ref 3.8–10.5)

## 2018-07-25 RX ORDER — VANCOMYCIN HCL 1 G
125 VIAL (EA) INTRAVENOUS EVERY 6 HOURS
Qty: 0 | Refills: 0 | Status: DISCONTINUED | OUTPATIENT
Start: 2018-07-25 | End: 2018-07-27

## 2018-07-25 RX ORDER — PANTOPRAZOLE SODIUM 20 MG/1
40 TABLET, DELAYED RELEASE ORAL EVERY 12 HOURS
Qty: 0 | Refills: 0 | Status: DISCONTINUED | OUTPATIENT
Start: 2018-07-25 | End: 2018-08-09

## 2018-07-25 RX ORDER — HYDROMORPHONE HYDROCHLORIDE 2 MG/ML
0.5 INJECTION INTRAMUSCULAR; INTRAVENOUS; SUBCUTANEOUS EVERY 6 HOURS
Qty: 0 | Refills: 0 | Status: DISCONTINUED | OUTPATIENT
Start: 2018-07-25 | End: 2018-07-31

## 2018-07-25 RX ADMIN — HYDROMORPHONE HYDROCHLORIDE 0.5 MILLIGRAM(S): 2 INJECTION INTRAMUSCULAR; INTRAVENOUS; SUBCUTANEOUS at 14:20

## 2018-07-25 RX ADMIN — HYDROMORPHONE HYDROCHLORIDE 0.5 MILLIGRAM(S): 2 INJECTION INTRAMUSCULAR; INTRAVENOUS; SUBCUTANEOUS at 13:48

## 2018-07-25 RX ADMIN — ERYTHROPOIETIN 4000 UNIT(S): 10000 INJECTION, SOLUTION INTRAVENOUS; SUBCUTANEOUS at 13:37

## 2018-07-25 RX ADMIN — FINASTERIDE 5 MILLIGRAM(S): 5 TABLET, FILM COATED ORAL at 17:13

## 2018-07-25 RX ADMIN — Medication 500 MILLIGRAM(S): at 05:52

## 2018-07-25 RX ADMIN — Medication 0.5 MILLIGRAM(S): at 09:27

## 2018-07-25 RX ADMIN — Medication 5 MILLIGRAM(S): at 17:15

## 2018-07-25 RX ADMIN — Medication 125 MILLIGRAM(S): at 13:32

## 2018-07-25 RX ADMIN — PANTOPRAZOLE SODIUM 40 MILLIGRAM(S): 20 TABLET, DELAYED RELEASE ORAL at 05:50

## 2018-07-25 RX ADMIN — Medication 100 MILLIGRAM(S): at 17:17

## 2018-07-25 RX ADMIN — Medication 50 MILLILITER(S): at 13:52

## 2018-07-25 RX ADMIN — ERYTHROPOIETIN 3000 UNIT(S): 10000 INJECTION, SOLUTION INTRAVENOUS; SUBCUTANEOUS at 13:36

## 2018-07-25 RX ADMIN — Medication 81 MILLIGRAM(S): at 17:14

## 2018-07-25 RX ADMIN — Medication 5 MILLIGRAM(S): at 09:04

## 2018-07-25 RX ADMIN — PANTOPRAZOLE SODIUM 40 MILLIGRAM(S): 20 TABLET, DELAYED RELEASE ORAL at 17:13

## 2018-07-25 RX ADMIN — Medication 500 MILLIGRAM(S): at 00:48

## 2018-07-25 RX ADMIN — CHOLESTYRAMINE 4 GRAM(S): 4 POWDER, FOR SUSPENSION ORAL at 10:09

## 2018-07-25 RX ADMIN — Medication 50 MILLILITER(S): at 12:55

## 2018-07-25 RX ADMIN — Medication 50 MILLILITER(S): at 14:56

## 2018-07-25 RX ADMIN — Medication 125 MILLIGRAM(S): at 23:19

## 2018-07-25 RX ADMIN — CHOLESTYRAMINE 4 GRAM(S): 4 POWDER, FOR SUSPENSION ORAL at 21:23

## 2018-07-25 RX ADMIN — HYDROMORPHONE HYDROCHLORIDE 0.5 MILLIGRAM(S): 2 INJECTION INTRAMUSCULAR; INTRAVENOUS; SUBCUTANEOUS at 21:33

## 2018-07-25 RX ADMIN — HEPARIN SODIUM 5000 UNIT(S): 5000 INJECTION INTRAVENOUS; SUBCUTANEOUS at 05:54

## 2018-07-25 RX ADMIN — SIMVASTATIN 10 MILLIGRAM(S): 20 TABLET, FILM COATED ORAL at 21:23

## 2018-07-25 RX ADMIN — Medication 8 MILLIGRAM(S): at 21:23

## 2018-07-25 RX ADMIN — Medication 25 MILLIGRAM(S): at 23:07

## 2018-07-25 RX ADMIN — Medication 500 MILLIGRAM(S): at 17:16

## 2018-07-25 RX ADMIN — HEPARIN SODIUM 5000 UNIT(S): 5000 INJECTION INTRAVENOUS; SUBCUTANEOUS at 17:15

## 2018-07-25 RX ADMIN — Medication 0.5 MILLIGRAM(S): at 20:03

## 2018-07-25 NOTE — PROGRESS NOTE ADULT - PROBLEM SELECTOR PLAN 2
- Resolved  - continue to maintain fluid balance with HD MWF   - control HFpEF with medical management   - O2 as needed  - Pulm appreciated, add nebs  - Incentive Spirometry  - CXR: improvement of Pulmonary Vascular Congestion  - CT Abdomen: Moderate Ascites - contributing to desaturation;   - 3L NC O2 at night and on exertion as needed

## 2018-07-25 NOTE — PROGRESS NOTE ADULT - ASSESSMENT
Pseudomembranous colitis status post recent antibiotics    By mouth vancomycin   case discussed with  family  improving  appetite may improve after DC flagyl, encourage PO intake        Patient had a recent course of C. difficile that was treated with vancomycin with a recurrence and he's had C. difficile in the past.  Due to severe C. difficile, would strongly consider a prolonged taper of vancomycin.  For now, would complete two-week course of vancomycin and then would taper her vancomycin over a long time.  Discussed with family.  Taper vancomycin, please dec vanco to 125 qid  Vancomycin 125 mg 4 times a day, one more week  Then 3 times a day for 2 weeks, then twice a day for one week, then daily for one week, then every other day for 1 week, then every 3rd day for 1 week.  Follow-up with me after that    start reglan due to N and possible gastroparesis  A fibrillation with rapid ventricular rhythm, status post Cardizem drip.    Would hold anticoagulation secondary to history bleeding    COPD obesity, obstructive sleep apnea, coronary artery disease, overall comorbid risk factors     IVF per renal  HD as needed      acites, SP paracentesis

## 2018-07-25 NOTE — PROGRESS NOTE ADULT - SUBJECTIVE AND OBJECTIVE BOX
82 y/o M w/pmhx of Afib, CHF, CAD s/p stents, COPD, recent PNA on abx, upper GI bleed (duodenal)  BIB EMS from [facility] for fever, abd pain, and diarrhea that began 3 weeks ago. Was in the hospital for PNA tx and was later dx with C-diff and started on a course of PO vancomycin for 10 days for the C-diff. States that he has had diarrhea since before the course of abx. Pain has been increased for the past few weeks and is characterized as a cramping feeling. Daughters also note that there is increased distension. Also notes chest pain characterized as a cramping in the central chest. 83% O2 sat noted morning of admission on 7/6/18, at the assisted living facility. Takes O2 routinely at night but not during the day.    7/25/18 - Patient seen and examined at bedside. Hemodynamically stable, no events overnight. Nurse reports that she changed pt 3x in the morning and his stool was brown with mucous and loose. Patient does not report fevers, chills, CP, SOB, headache and N/V/D. Family member at bedside states that patients belly was extremely tender on admission and has improved very much since admission.     Vital Signs Last 24 Hrs  T(C): 36.8 (25 Jul 2018 17:06), Max: 36.8 (24 Jul 2018 22:04)  T(F): 98.3 (25 Jul 2018 17:06), Max: 98.3 (24 Jul 2018 22:04)  HR: 73 (25 Jul 2018 17:06) (63 - 84)  BP: 123/35 (25 Jul 2018 17:06) (102/21 - 133/41)  BP(mean): --  RR: 18 (25 Jul 2018 17:06) (15 - 18)  SpO2: 97% (25 Jul 2018 12:36) (95% - 98%)    PHYSICAL EXAM:  Constitutional: NAD  HEENT: PERR, EOMI  Neck: Soft and supple, No LAD,   Respiratory: Breath sounds are clear bilaterally, No wheezing, rales or rhonchi  Cardiovascular: S1 and S2, regular rate and rhythm, no Murmurs, gallops or rubs  Gastrointestinal: Bowel Sounds present, soft, tender to palpation +, Distended+, no rigidity, no rebound  Extremities: LLE lesion with dressings c/d/i, RLE s/p AKA +  Vascular: 2+ peripheral pulses  Neurological: A/O x 3, no focal deficits  Musculoskeletal: unable to assess  Skin: Incontinence associated Dermatitis     MEDICATIONS  (STANDING):          07-25    141  |  106  |  43<H>  ----------------------------<  81  3.5   |  26  |  4.31<H>    Ca    7.6<L>      25 Jul 2018 07:28  Mg     1.9     07-25                              7.9    4.02  )-----------( 205      ( 25 Jul 2018 07:28 )             26.0       LIVER FUNCTIONS - ( 23 Jul 2018 12:45 )  Alb: 2.3 g/dL / Pro: 4.7 gm/dL / ALK PHOS: x     / ALT: x     / AST: x     / GGT: x             Blood Culture:   Culture - Urine (07.10.18 @ 16:30)    Specimen Source: .Urine Catheterized    Culture Results:   <10,000 CFU/ml  Normal Urogenital ravinder present    Culture - Blood (07.06.18 @ 13:42)    Specimen Source: .Blood Blood-Peripheral    Culture Results:   No growth at 5 days.          RADIOLOGY/EKG:  < from: Xray Chest 1 View- PORTABLE-Urgent (07.16.18 @ 18:34) >  IMPRESSION:    Findings suggesting persistent mild CHF with mild pulmonary vascular   congestion and trace fluid in the right minor fissure.    Stable right CVC.    < end of copied text >    < from: Transthoracic Echocardiogram (07.10.18 @ 10:21) >   Impression     Summary     Fibrocalcific changes noted to the mitral valve leaflets with preserved   leaflet excursion.   Moderate (2+) mitral regurgitation is present.   The left atrium is moderately dilated.   Mild concentric left ventricular hypertrophy is present.   Estimated left ventricular ejection fraction is 55-60 %.    < end of copied text >    < from: CT Abdomen and Pelvis No Cont (07.21.18 @ 11:15) >  IMPRESSION:     Stable appearance of pancolitis consistent with pseudomembranous colitis.   No pneumatosis or free air.    Increasing moderate ascites.    < end of copied text >    FINDINGS:  1. Ascites on preprocedure ultrasound.  2. Access of the peritoneal cavity under direct ultrasound guidance with   a 5 French Yueh needle   3. 2800 cc of clear yellow fluid aspirated  4. Resolution of ascites on postprocedure ultrasound    IMPRESSION:  Successful ultrasound-guided paracentesis    PLAN:   1. Return to interventional radiology for repeat paracentesis as needed.  2. Follow laboratory studies as ordered    Peritoneal fluid analysis  PMN seen    Cytopathology of Ascitic fluid  negative for malignant cells, acute and chronic nflammationary cells present     Serum   Albumin= 2.3  Protein= 4.7   LDH= 160       Ascitis Fluid  LDH= 83, Protein= 2.8, Albumin= 1.7     DVT PPX: NO AC due to severe GI bleed in past-- on Heparin subQ 5000U q12h 82 y/o M w/pmhx of Afib, CHF, CAD s/p stents, COPD, recent PNA on abx, upper GI bleed (duodenal)  BIB EMS from [facility] for fever, abd pain, and diarrhea that began 3 weeks ago. Was in the hospital for PNA tx and was later dx with C-diff and started on a course of PO vancomycin for 10 days for the C-diff. States that he has had diarrhea since before the course of abx. Pain has been increased for the past few weeks and is characterized as a cramping feeling. Daughters also note that there is increased distension. Also notes chest pain characterized as a cramping in the central chest. 83% O2 sat noted morning of admission on 7/6/18, at the assisted living facility. Takes O2 routinely at night but not during the day.    7/25/18 - Patient seen and examined at bedside. Hemodynamically stable, no events overnight. Nurse reports that she changed pt 3x in the morning and his stool was brown with mucous and loose. Patient does not report fevers, chills, CP, SOB, headache and N/V/D. Family member at bedside states that patients belly was extremely tender on admission and has improved very much since admission.     Vital Signs Last 24 Hrs  T(C): 36.8 (25 Jul 2018 17:06), Max: 36.8 (24 Jul 2018 22:04)  T(F): 98.3 (25 Jul 2018 17:06), Max: 98.3 (24 Jul 2018 22:04)  HR: 73 (25 Jul 2018 17:06) (63 - 84)  BP: 123/35 (25 Jul 2018 17:06) (102/21 - 133/41)  BP(mean): --  RR: 18 (25 Jul 2018 17:06) (15 - 18)  SpO2: 97% (25 Jul 2018 12:36) (95% - 98%)    PHYSICAL EXAM:  Constitutional: NAD  HEENT: PERR, EOMI  Neck: Soft and supple, No LAD,   Respiratory: Breath sounds are clear bilaterally, No wheezing, rales or rhonchi  Cardiovascular: S1 and S2, regular rate and rhythm, no Murmurs, gallops or rubs  Gastrointestinal: Bowel Sounds present, soft, tender to palpation +, Distended+, no rigidity, no rebound  Extremities: LLE lesion with dressings c/d/i, RLE s/p AKA +  Vascular: 2+ peripheral pulses  Neurological: A/O x 3, no focal deficits  Musculoskeletal: unable to assess  Skin: Incontinence associated Dermatitis     MEDICATIONS  (STANDING):    MEDICATIONS  (STANDING):  ALBUTerol/ipratropium for Nebulization. 3 milliLiter(s) Nebulizer once  allopurinol 100 milliGRAM(s) Oral daily  aspirin  chewable 81 milliGRAM(s) Oral daily  buDESOnide   0.5 milliGRAM(s) Respule 0.5 milliGRAM(s) Inhalation two times a day  cholestyramine Powder (Sugar-Free) 4 Gram(s) Oral two times a day  diltiazem    Tablet 30 milliGRAM(s) Oral every 6 hours  doxazosin 8 milliGRAM(s) Oral at bedtime  epoetin nelly Injectable 3000 Unit(s) IV Push <User Schedule>  epoetin nelly Injectable 4000 Unit(s) IV Push <User Schedule>  finasteride 5 milliGRAM(s) Oral daily  heparin  Injectable 5000 Unit(s) SubCutaneous every 12 hours  metoclopramide 5 milliGRAM(s) Oral three times a day  pantoprazole    Tablet 40 milliGRAM(s) Oral every 12 hours  simvastatin 10 milliGRAM(s) Oral at bedtime  sodium ferric gluconate complex Injectable 125 milliGRAM(s) IV Push <User Schedule>  vancomycin    Solution 125 milliGRAM(s) Oral every 6 hours    MEDICATIONS  (PRN):  acetaminophen   Tablet 650 milliGRAM(s) Oral every 8 hours PRN For Temp greater than 38 C (100.4 F)  acetaminophen   Tablet. 650 milliGRAM(s) Oral every 8 hours PRN Mild Pain (1 - 3)  albumin human 25% IVPB 50 milliLiter(s) IV Intermittent <User Schedule> PRN sbp<=120mmhg  ALBUTerol   0.5% 2.5 milliGRAM(s) Nebulizer every 4 hours PRN Shortness of Breath and/or Wheezing  diphenhydrAMINE   Capsule 25 milliGRAM(s) Oral at bedtime PRN sleep  HYDROmorphone  Injectable 0.5 milliGRAM(s) IV Push every 6 hours PRN Severe Pain (7 - 10)  ondansetron Injectable 4 milliGRAM(s) IV Push every 6 hours PRN Nausea and/or Vomiting        07-25    141  |  106  |  43<H>  ----------------------------<  81  3.5   |  26  |  4.31<H>    Ca    7.6<L>      25 Jul 2018 07:28  Mg     1.9     07-25                              7.9    4.02  )-----------( 205      ( 25 Jul 2018 07:28 )             26.0       LIVER FUNCTIONS - ( 23 Jul 2018 12:45 )  Alb: 2.3 g/dL / Pro: 4.7 gm/dL / ALK PHOS: x     / ALT: x     / AST: x     / GGT: x             Blood Culture:   Culture - Urine (07.10.18 @ 16:30)    Specimen Source: .Urine Catheterized    Culture Results:   <10,000 CFU/ml  Normal Urogenital ravinder present    Culture - Blood (07.06.18 @ 13:42)    Specimen Source: .Blood Blood-Peripheral    Culture Results:   No growth at 5 days.          RADIOLOGY/EKG:  < from: Xray Chest 1 View- PORTABLE-Urgent (07.16.18 @ 18:34) >  IMPRESSION:    Findings suggesting persistent mild CHF with mild pulmonary vascular   congestion and trace fluid in the right minor fissure.    Stable right CVC.    < end of copied text >    < from: Transthoracic Echocardiogram (07.10.18 @ 10:21) >   Impression     Summary     Fibrocalcific changes noted to the mitral valve leaflets with preserved   leaflet excursion.   Moderate (2+) mitral regurgitation is present.   The left atrium is moderately dilated.   Mild concentric left ventricular hypertrophy is present.   Estimated left ventricular ejection fraction is 55-60 %.    < end of copied text >    < from: CT Abdomen and Pelvis No Cont (07.21.18 @ 11:15) >  IMPRESSION:     Stable appearance of pancolitis consistent with pseudomembranous colitis.   No pneumatosis or free air.    Increasing moderate ascites.    < end of copied text >    FINDINGS:  1. Ascites on preprocedure ultrasound.  2. Access of the peritoneal cavity under direct ultrasound guidance with   a 5 French Yueh needle   3. 2800 cc of clear yellow fluid aspirated  4. Resolution of ascites on postprocedure ultrasound    IMPRESSION:  Successful ultrasound-guided paracentesis    PLAN:   1. Return to interventional radiology for repeat paracentesis as needed.  2. Follow laboratory studies as ordered    Peritoneal fluid analysis  PMN seen    Cytopathology of Ascitic fluid  negative for malignant cells, acute and chronic nflammationary cells present     Serum   Albumin= 2.3  Protein= 4.7   LDH= 160       Ascitis Fluid  LDH= 83, Protein= 2.8, Albumin= 1.7     DVT PPX: NO AC due to severe GI bleed in past-- on Heparin subQ 5000U q12h

## 2018-07-25 NOTE — PROGRESS NOTE ADULT - PROBLEM SELECTOR PLAN 1
- Due to severe C. difficile, would strongly consider a prolonged taper of vancomycin.  - day #16 vancomycin po 500 mg po q 6 hours  - would complete another 7 days po vancomycin  - completed 10 days iv flagyl  - tolerating antibiotics without rashes or side effects   - continue contact isolation  - limit antibiotics exposure

## 2018-07-25 NOTE — PROGRESS NOTE ADULT - SUBJECTIVE AND OBJECTIVE BOX
NEPHROLOGY INTERVAL HPI/OVERNIGHT EVENTS:  7/25 SY  No acute events.  Again reports increased urine output.--Not recorded.  Complains of continued diarrhea.    7/24 SY  Post HD yesterday.  post Paracentesis.     Reports feeling much better.  Reports increased urine output.    MEDICATIONS  (STANDING):  ALBUTerol/ipratropium for Nebulization. 3 milliLiter(s) Nebulizer once  allopurinol 100 milliGRAM(s) Oral daily  aspirin  chewable 81 milliGRAM(s) Oral daily  buDESOnide   0.5 milliGRAM(s) Respule 0.5 milliGRAM(s) Inhalation two times a day  cholestyramine Powder (Sugar-Free) 4 Gram(s) Oral two times a day  diltiazem    Tablet 30 milliGRAM(s) Oral every 6 hours  doxazosin 8 milliGRAM(s) Oral at bedtime  epoetin nelly Injectable 3000 Unit(s) IV Push <User Schedule>  epoetin nelly Injectable 4000 Unit(s) IV Push <User Schedule>  finasteride 5 milliGRAM(s) Oral daily  heparin  Injectable 5000 Unit(s) SubCutaneous every 12 hours  metoclopramide 5 milliGRAM(s) Oral three times a day  pantoprazole  Injectable 40 milliGRAM(s) IV Push every 12 hours  simvastatin 10 milliGRAM(s) Oral at bedtime  sodium ferric gluconate complex Injectable 125 milliGRAM(s) IV Push <User Schedule>  vancomycin    Solution 500 milliGRAM(s) Oral every 6 hours    MEDICATIONS  (PRN):  acetaminophen   Tablet 650 milliGRAM(s) Oral every 8 hours PRN For Temp greater than 38 C (100.4 F)  acetaminophen   Tablet. 650 milliGRAM(s) Oral every 8 hours PRN Mild Pain (1 - 3)  albumin human 25% IVPB 50 milliLiter(s) IV Intermittent <User Schedule> PRN sbp<=120mmhg  ALBUTerol   0.5% 2.5 milliGRAM(s) Nebulizer every 4 hours PRN Shortness of Breath and/or Wheezing  diphenhydrAMINE   Capsule 25 milliGRAM(s) Oral at bedtime PRN sleep  ondansetron Injectable 4 milliGRAM(s) IV Push every 6 hours PRN Nausea and/or Vomiting          Vital Signs Last 24 Hrs  T(C): 36.4 (25 Jul 2018 04:48), Max: 36.8 (24 Jul 2018 12:20)  T(F): 97.5 (25 Jul 2018 04:48), Max: 98.3 (24 Jul 2018 22:04)  HR: 78 (25 Jul 2018 05:55) (71 - 80)  BP: 132/62 (25 Jul 2018 05:55) (112/39 - 133/39)  BP(mean): --  RR: 18 (25 Jul 2018 04:48) (18 - 18)  SpO2: 95% (25 Jul 2018 04:48) (95% - 99%)  Daily     Daily     07-24 @ 07:01  -  07-25 @ 07:00  --------------------------------------------------------  IN: 360 mL / OUT: 0 mL / NET: 360 mL        PHYSICAL EXAM:  Alert and appropriate  GENERAL: No distress  CHEST/LUNG: Improved basilar rales.   HEART: S1S2 RRR  ABDOMEN: distended.  EXTREMITIES: decreased edema  SKIN:     LABS:                        7.9    4.02  )-----------( 205      ( 25 Jul 2018 07:28 )             26.0     07-25    141  |  106  |  43<H>  ----------------------------<  81  3.5   |  26  |  4.31<H>    Ca    7.6<L>      25 Jul 2018 07:28  Mg     1.9     07-25    TPro  4.7<L>  /  Alb  2.3<L>  /  TBili  x   /  DBili  x   /  AST  x   /  ALT  x   /  AlkPhos  x   07-23        Magnesium, Serum: 1.9 mg/dL (07-25 @ 07:28)          RADIOLOGY & ADDITIONAL TESTS:

## 2018-07-25 NOTE — PROGRESS NOTE ADULT - PROBLEM SELECTOR PLAN 1
- vanc 125 mg TID recommended by GI and discussed   - s/p Flagyl 10 days  - ID appreciated, continue PO vancomycin  - GI appreciated, taper regimen structured  - Colorectal Consult appreciated, would not consider intervention at this time  -Paracentesis 7/23/18- 2800 cc removed, cytopathology report-no malig, inflammatory cells, PMNs seen on analysis of fluid  -SAAG= .6 (<1.1), therefore not pointing towards HF, or portal hypertension--DDx:  serositis - vanc 125 mg TID recommended by GI and discussed -Then 3 times a day for 2 weeks, then twice a day for one week, then daily for one week, then every other day for 1 week, then every 3rd day for 1 week. Recommend follow-up with Dr. Grullon after d/c  - s/p Flagyl 10 days  - ID appreciated, continue PO vancomycin  - GI appreciated, taper regimen structured  - Colorectal Consult appreciated, would not consider intervention at this time  -Paracentesis 7/23/18- 2800 cc removed, cytopathology report-no malig, inflammatory cells, PMNs seen on analysis of fluid  -SAAG= .6 (<1.1), therefore not pointing towards HF, or portal hypertension--DDx:  serositis

## 2018-07-25 NOTE — PROGRESS NOTE ADULT - SUBJECTIVE AND OBJECTIVE BOX
82 y/o M w/pmhx of Afib, CHF, CAD s/p stents, COPD, recent PNA on abx, upper GI bleed (duodenal)  BIB EMS from [facility] for fever, abd pain, and diarrhea that began 3 weeks ago. Was in the hospital for PNA tx and was later dx with C-diff and started on a course of PO vancomycin for 10 days for the C-diff. States that he has had diarrhea since before the course of abx. Pain has been increased for the past few weeks and is characterized as a cramping feeling. Daughters also note that there is increased distension. Also notes chest pain characterized as a cramping in the central chest. 83% O2 sat noted morning of admission on 7/6/18, at the assisted living facility. Takes O2 routinely at night but not during the day.    7/25: seen and eval. patient very deconditioned and frail. Currently getting HD. Paracentesis reviewed. No overnight fevers, chills or shakes. Labs and vitals reviewed. Patient denies any HA, CP, SOB.     ROS:  as per HPI    PHYSICAL EXAM:  T(C): 36.6 (25 Jul 2018 12:50), Max: 36.8 (24 Jul 2018 17:15)  T(F): 97.8 (25 Jul 2018 12:50), Max: 98.3 (24 Jul 2018 22:04)  HR: 74 (25 Jul 2018 14:57) (63 - 84)  BP: 102/50 (25 Jul 2018 14:57) (102/21 - 133/39)  RR: 17 (25 Jul 2018 14:57) (16 - 18)  SpO2: 97% (25 Jul 2018 12:36) (95% - 99%)  Constitutional: very deconditioned, frail, sick appearing  HEENT: PERR, EOMI, Normal Hearing, MMM  Neck: no JVD  Respiratory: Breath sounds are clear bilaterally, No wheezing, rales or rhonchi  Cardiovascular: S1 and S2, regular rate and rhythm, no Murmurs, gallops or rubs  Gastrointestinal:  Distended+, no rigidity, no rebound  Extremities: LLE lesion with dressings c/d/i, RLE s/p AKA +  Vascular: 2+ peripheral pulses  Neurological: A/O x 3, no focal deficits  Musculoskeletal: unable to assess  Skin: Incontinence associated Dermatitis with Rectal tube+ output green/loose stool      LABS:   CBC Full  -  ( 25 Jul 2018 07:28 )  WBC Count : 4.02 K/uL  Hemoglobin : 7.9 g/dL  Hematocrit : 26.0 %  Platelet Count - Automated : 205 K/uL    141  |  106  |  43<H>  ----------------------------<  81  3.5   |  26  |  4.31<H>    Ca    7.6<L>      25 Jul 2018 07:28  Mg     1.9     07-25

## 2018-07-25 NOTE — PROGRESS NOTE ADULT - PROBLEM SELECTOR PLAN 2
- Resolved  - continue to maintain fluid balance with HD MWF   - control HFpEF with medical management   - O2 as needed  - Pulm appreciated, add nebs  - Incentive Spirometry  - CXR: improvement of Pulmonary Vascular Congestion  - CT Abdomen: Moderate Ascites - contributing to desaturation;   - S/P Paracentesis 7/23/18 2800 cc fluid removed, improved respiratory status   - 3L NC O2 at night and on exertion as needed

## 2018-07-25 NOTE — PROGRESS NOTE ADULT - ASSESSMENT
84 y/o wm with hx of ckd stage 3 ( scr 1.5-1.7) with ARYA due to volume depletion from CDiff associated colitis and diarrhea in presence of diuretic use PTA.  Now with non anion gap metabolic acidosis and worsening renal parameters.  CXR with mild CHF with hx of diastolic CHF.    PLAN  - obtain abd and lactate  - will change ivf and add bicarbonate and keep at current rate  - hold lasix done.   - fu uop and scr closely and will monitor respiratory status  - bp and hr stable now, cardizem adjusted and lopressor added    7/9 MK  - ARYA: worsening renal parameters with metabolic acidosis, non ag.  Previous lactate WNL and since placed on bicarbonate drip  fu repeat abg today.  Increase IVF rate  possibility of HD discussed with enio, hcp daughter, pt has been agreeable in past.   DC hydralazine  dc morphine and change to dilaudid  - Cdiff with rising scr and worsening abd distension: CRS consult ongoing  possible repeat ct scan  DW Dr ballesteros    7/10  Anuric  anasarca  Non anion gap acidosis on bicarb drip  ARYA, anuric  CKD 3 history  d/w Family, and pt agreeable  called ICU for line placement and to dialyze the pt in ICU for monitoring    7/10  HD initiated via R temp HD catheter  tolerating well  no complaints  good abf    7/11 SY  --ARYA with Anasarca.  Urine output slightly improved.  However, remains quite fluid overloaded.  Will plan to continue HD until fluid status is much improved.  HD order written.  3 hour tx today.  Will aim to remove 2.5 kg as tolerated.  --C DIff colitis ; continue to monitor closely.    7/12 SY  --ARYA with Anasarca.   Remains Oligo-anuric.    --HD with goal of 2.5 kg UF again today.  --C Diff colitis.   Clinically improved   Continue to monitor.    7/13  The patient was dialyzed 3 days in a row this week  Discussed with the patient's daughter and hospitalist, intensivist  Will skip dialysis today, no urgent need for dialysis  We'll dialyze the patient tomorrow on Saturday and then skip Sunday to dialyze the patient again on Monday.    7/14  We'll dialyze against 4 K bath  Case discussed with the patient's daughter  May need a permanent catheter by Monday      7/15  Dialyze with a 4K bath  Potassium is 3.3 most likely from the diarrhea  Schedule for permacath placement after dialysis on Monday or Tuesday  Patient is comfortable no complications noted at this time    7/16 MK  - ARYA/ CKD stage 3 remains oliguirc and HD dependent.  LImited UF at HD toleated with the   hypokalemia corrected with HD.  Permcath planned today  - Cdiff: on PO vanc.  improved leukocytosis and abd distension     7/17  s/p perm cath  HD tomorrow  4 K bath  replace K   overall stable    7/18 SY  --ARYA/CKD for now remaining dialysis dependent.  Continue TIW HD.  --C Diff colitis ; improving clinically.    7/19 SY  --ARYA/CKD : Continue TIW HD.   Will continue as outpatient for now as no signs of recovery at this point.  --C Diff colitis  : clinically improving.  Continue po abtx.  --D/c Planning  D/w pt's daughter re : rehab with HD.  --Replete K.  Continue regular diet without restrictions for now.    7/20 MK  - ARYA/CKD remains HD dependent with oliguria.  Will attempt UF with HD.  K correction with HD and changed to regular diet with fluid restriction  - Cdiff: PO vanc with improving renal paramenters-  - Episodes of Desat: will have pulmonary re-eval prior to dc.  - DC planning rehab with dialysis  dw Dr Hightower  will see pt    7/21 MK  - ARYA/CKD remains hd dependent. post hd cxr with improved PVC.  Still with episodes of desaturation and dr hightower input noted.  CT with evidence of ascites for paracentesis  - cdiff: on po vanc.  still with loose stools,     7/22 MK  - ARYA/CKD remains HD dependent.  Will coordinate AM HD based upon timing of paracentesis, hypokalemia improved  - hypoxia with ascites: for paracentesis   - cdif on po vanc, rectal tube in place    7/23 MK  - ARYA/CKD remains HD dependent, toelrating hd.  + inc uop   - hypoxia with ascites: s/p paracentesis today, monitor response to oxygenation  - AOCD; fu trend of h/h, on epogen  - cdiff: po vanc    7/24 SY  --ARYA/CKD  Urine output may be increasing.  Follow creat trend to determine further dialysis support.  --Post Paracentesis : improve resp status.  --Anemia : continue LETICIA.  --C Diff : continue po Vanco.    7/25 SY  --ARYA/CKD  Monitor urine output.  Noted with increased creat.  Will maintain on TIW HD since fluid overloaded state is persistent.  --Monitor GI function.  --Post Paracentesis : resp status stabilized.  --Anemia :continue LETICIA.

## 2018-07-26 LAB
ALBUMIN SERPL ELPH-MCNC: 2.3 G/DL — LOW (ref 3.3–5)
ANION GAP SERPL CALC-SCNC: 10 MMOL/L — SIGNIFICANT CHANGE UP (ref 5–17)
BUN SERPL-MCNC: 28 MG/DL — HIGH (ref 7–23)
CALCIUM SERPL-MCNC: 7.5 MG/DL — LOW (ref 8.5–10.1)
CHLORIDE SERPL-SCNC: 107 MMOL/L — SIGNIFICANT CHANGE UP (ref 96–108)
CO2 SERPL-SCNC: 25 MMOL/L — SIGNIFICANT CHANGE UP (ref 22–31)
CREAT SERPL-MCNC: 3.21 MG/DL — HIGH (ref 0.5–1.3)
GLUCOSE SERPL-MCNC: 74 MG/DL — SIGNIFICANT CHANGE UP (ref 70–99)
HCT VFR BLD CALC: 24.7 % — LOW (ref 39–50)
HGB BLD-MCNC: 7.5 G/DL — LOW (ref 13–17)
MAGNESIUM SERPL-MCNC: 1.7 MG/DL — SIGNIFICANT CHANGE UP (ref 1.6–2.6)
MCHC RBC-ENTMCNC: 29.3 PG — SIGNIFICANT CHANGE UP (ref 27–34)
MCHC RBC-ENTMCNC: 30.4 GM/DL — LOW (ref 32–36)
MCV RBC AUTO: 96.5 FL — SIGNIFICANT CHANGE UP (ref 80–100)
NRBC # BLD: 0 /100 WBCS — SIGNIFICANT CHANGE UP (ref 0–0)
PHOSPHATE SERPL-MCNC: 2.9 MG/DL — SIGNIFICANT CHANGE UP (ref 2.5–4.5)
PLATELET # BLD AUTO: 174 K/UL — SIGNIFICANT CHANGE UP (ref 150–400)
POTASSIUM SERPL-MCNC: 3.4 MMOL/L — LOW (ref 3.5–5.3)
POTASSIUM SERPL-SCNC: 3.4 MMOL/L — LOW (ref 3.5–5.3)
RBC # BLD: 2.56 M/UL — LOW (ref 4.2–5.8)
RBC # FLD: 19.7 % — HIGH (ref 10.3–14.5)
SODIUM SERPL-SCNC: 142 MMOL/L — SIGNIFICANT CHANGE UP (ref 135–145)
WBC # BLD: 3.47 K/UL — LOW (ref 3.8–10.5)
WBC # FLD AUTO: 3.47 K/UL — LOW (ref 3.8–10.5)

## 2018-07-26 RX ORDER — POTASSIUM CHLORIDE 20 MEQ
20 PACKET (EA) ORAL
Qty: 0 | Refills: 0 | Status: COMPLETED | OUTPATIENT
Start: 2018-07-26 | End: 2018-07-26

## 2018-07-26 RX ORDER — METOCLOPRAMIDE HCL 10 MG
10 TABLET ORAL THREE TIMES A DAY
Qty: 0 | Refills: 0 | Status: DISCONTINUED | OUTPATIENT
Start: 2018-07-26 | End: 2018-07-27

## 2018-07-26 RX ADMIN — Medication 100 MILLIGRAM(S): at 11:06

## 2018-07-26 RX ADMIN — Medication 5 MILLIGRAM(S): at 09:06

## 2018-07-26 RX ADMIN — Medication 10 MILLIGRAM(S): at 11:06

## 2018-07-26 RX ADMIN — Medication 0.5 MILLIGRAM(S): at 08:58

## 2018-07-26 RX ADMIN — PANTOPRAZOLE SODIUM 40 MILLIGRAM(S): 20 TABLET, DELAYED RELEASE ORAL at 17:19

## 2018-07-26 RX ADMIN — Medication 125 MILLIGRAM(S): at 05:30

## 2018-07-26 RX ADMIN — Medication 125 MILLIGRAM(S): at 17:19

## 2018-07-26 RX ADMIN — FINASTERIDE 5 MILLIGRAM(S): 5 TABLET, FILM COATED ORAL at 11:06

## 2018-07-26 RX ADMIN — CHOLESTYRAMINE 4 GRAM(S): 4 POWDER, FOR SUSPENSION ORAL at 09:06

## 2018-07-26 RX ADMIN — SIMVASTATIN 10 MILLIGRAM(S): 20 TABLET, FILM COATED ORAL at 23:27

## 2018-07-26 RX ADMIN — HYDROMORPHONE HYDROCHLORIDE 0.5 MILLIGRAM(S): 2 INJECTION INTRAMUSCULAR; INTRAVENOUS; SUBCUTANEOUS at 21:14

## 2018-07-26 RX ADMIN — Medication 8 MILLIGRAM(S): at 23:27

## 2018-07-26 RX ADMIN — Medication 125 MILLIGRAM(S): at 11:07

## 2018-07-26 RX ADMIN — Medication 0.5 MILLIGRAM(S): at 19:41

## 2018-07-26 RX ADMIN — Medication 20 MILLIEQUIVALENT(S): at 15:45

## 2018-07-26 RX ADMIN — Medication 20 MILLIEQUIVALENT(S): at 23:27

## 2018-07-26 RX ADMIN — Medication 125 MILLIGRAM(S): at 23:28

## 2018-07-26 RX ADMIN — CHOLESTYRAMINE 4 GRAM(S): 4 POWDER, FOR SUSPENSION ORAL at 21:01

## 2018-07-26 RX ADMIN — Medication 20 MILLIEQUIVALENT(S): at 17:19

## 2018-07-26 RX ADMIN — Medication 81 MILLIGRAM(S): at 11:06

## 2018-07-26 RX ADMIN — Medication 10 MILLIGRAM(S): at 23:27

## 2018-07-26 RX ADMIN — PANTOPRAZOLE SODIUM 40 MILLIGRAM(S): 20 TABLET, DELAYED RELEASE ORAL at 05:30

## 2018-07-26 RX ADMIN — HYDROMORPHONE HYDROCHLORIDE 0.5 MILLIGRAM(S): 2 INJECTION INTRAMUSCULAR; INTRAVENOUS; SUBCUTANEOUS at 11:06

## 2018-07-26 RX ADMIN — HEPARIN SODIUM 5000 UNIT(S): 5000 INJECTION INTRAVENOUS; SUBCUTANEOUS at 05:30

## 2018-07-26 RX ADMIN — HEPARIN SODIUM 5000 UNIT(S): 5000 INJECTION INTRAVENOUS; SUBCUTANEOUS at 17:19

## 2018-07-26 NOTE — PROGRESS NOTE ADULT - SUBJECTIVE AND OBJECTIVE BOX
Patient is a 83y old  Male who presents with a chief complaint of complain of diarrhea, fever (06 Jul 2018 23:55)      HPI:  Pt is a 84 y/o M w/pmhx of Afib, CHF, CAD s/p stents, COPD, PNA, upper GI bleed (duodenal)  BIB EMS from [facility] assisted living for fever, abd pain, and diarrhea that began 3 weeks ago. Was in the hospital for PNA tx and was later dx with C-diff and started on a course of PO vancomycin for 10 days for the C-diff. States that he has had diarrhea since before the course of abx. Pain has been increased for the past few weeks and is characterized as a cramping feeling. Daughters also note that there is increased distension. Also notes chest pain characterized as a cramping in the central chest. 83% O2 sat noted this morning at the assisted living facility. Takes O2 routinely at night but not during the day. (06 Jul 2018 23:55)  Patient's daughter states he was treated for ESBL with antibiotics prior to this  7/10  seen and examined with his dter at bedside  hx of resp failure and intubation 2012 x 3-4 days  feels ok with breathing now  being evaluated for surgical interventions with pancolitis  possibility of post op need for vent support discussed witrh pt and his dtr  he wants to proceed understanding high risk for surgery due to compromised medical condition  hx SHAYY and treated with BIPAP in the past / stopped using it after wt loss  7/11  seen in ICU with dtr at bedside  data discussed with critical care RN  s/p Ross and hemodialysis  still with abd distention  family wants to wait on abd surgery given he is high risk  7/12  family at bedside updated  no cp  no difficulty breathing  intermittent wheezing  7/13  NGT is discontinued  dtr at bedside updated  no issue with breathing now  7/14  having HD  some po intake tolerated ok  dtr at bedside updated  no active pulm issues  7/15  resting comfortably  no distress  Gi and renal eval noted  7/16  feels better  having dialysis  awake and alert  po intake tolerated well  7/17  dtr at bedside  s/lauren tate placed right chest  no cp    7/21  I was asked by Dr Tati Tomas to re eval for reported episodes of desaturations  DTR at bedside  data discussed at length  pt's reported hypoxemic episodes after narcotics / while sleeping or having abd distention  he has known hx SHAYY / OHS and prior BIPAP 12/8 with O2 attached at home  this am after 35% VM was removed in his room, RA O2 sat 88%-93% range  no distress  no significant cough  occasional wheeze    7/22  feels the same  family updated at bedside  reported plan for paracentesis in am  vomited once  7/23  data rev with critical care RN at bedside this am  overnight desat required 35% VM, o2 sat this am 93%  no distress  sleeping  plan for paracentesis   7/24  seen on 5 [place] today  feels better  no issues with his breathing  s/p paracentesis  7/26  dtr at bedside updated today  no active pulm issues now  PAST MEDICAL & SURGICAL HISTORY:  Diastolic CHF  Peripheral vascular disease  Afib  Anemia  CKD (chronic kidney disease)  COPD (chronic obstructive pulmonary disease)  SHAYY (obstructive sleep apnea)  Sepsis, due to unspecified organism: 2/2 poorly healing wounds b/l  Dyspepsia: On moderate exertion.  Sleep apnea, obstructive: Requires home 02 therapy, and treatment with BIPAP  Atelectasis  Pleural effusion, bilateral  Respiratory failure  Peripheral edema  CRI (chronic renal insufficiency)  Gout  Benign prostatic hypertrophy  Spinal stenosis  Hypercholesterolemia  GERD (gastroesophageal reflux disease)  CAD (coronary artery disease)  Hypertension  S/P angioplasty with stent  Cataract of left eye  Prostate: Surgery green light procedure.  S/P rotator cuff surgery: Right  S/P angioplasty    MEDICATIONS  (STANDING):  ALBUTerol/ipratropium for Nebulization. 3 milliLiter(s) Nebulizer once  allopurinol 100 milliGRAM(s) Oral daily  aspirin  chewable 81 milliGRAM(s) Oral daily  buDESOnide   0.5 milliGRAM(s) Respule 0.5 milliGRAM(s) Inhalation two times a day  cholestyramine Powder (Sugar-Free) 4 Gram(s) Oral two times a day  diltiazem    Tablet 30 milliGRAM(s) Oral every 6 hours  doxazosin 8 milliGRAM(s) Oral at bedtime  epoetin nelly Injectable 3000 Unit(s) IV Push <User Schedule>  epoetin nelly Injectable 4000 Unit(s) IV Push <User Schedule>  finasteride 5 milliGRAM(s) Oral daily  heparin  Injectable 5000 Unit(s) SubCutaneous every 12 hours  pantoprazole  Injectable 40 milliGRAM(s) IV Push every 12 hours  simvastatin 10 milliGRAM(s) Oral at bedtime  sodium ferric gluconate complex Injectable 125 milliGRAM(s) IV Push <User Schedule>  vancomycin    Solution 500 milliGRAM(s) Oral every 6 hours          MEDICATIONS  (PRN):  acetaminophen   Tablet 650 milliGRAM(s) Oral every 8 hours PRN For Temp greater than 38 C (100.4 F)  acetaminophen   Tablet. 650 milliGRAM(s) Oral every 8 hours PRN Mild Pain (1 - 3)  ALBUTerol   0.5% 2.5 milliGRAM(s) Nebulizer every 4 hours PRN Shortness of Breath and/or Wheezing  morphine  - Injectable 2 milliGRAM(s) IV Push every 6 hours PRN Severe Pain (7 - 10)  ondansetron Injectable 4 milliGRAM(s) IV Push every 6 hours PRN Nausea and/or Vomiting      FAMILY HISTORY:  No pertinent family history in first degree relatives    REVIEW OF SYSTEM:  abdominal pain, otherwise 12 point ROS negative     Vital Signs Last 24 Hrs  T(C): 36.7 (26 Jul 2018 05:31), Max: 36.8 (25 Jul 2018 12:36)  T(F): 98.1 (26 Jul 2018 05:31), Max: 98.3 (25 Jul 2018 17:06)  HR: 84 (26 Jul 2018 05:31) (63 - 84)  BP: 119/27 (26 Jul 2018 05:31) (102/21 - 133/41)  BP(mean): --  RR: 18 (26 Jul 2018 05:31) (15 - 18)  SpO2: 97% (26 Jul 2018 05:31) (97% - 97%)  PHYSICAL EXAM  General Appearance: cooperative, no acute distress,   HEENT: PERRL, conjunctiva clear, EOM's intact, non injected pharynx, no exudate, TM   normal  Neck: Supple, , no adenopathy, thyroid: not enlarged, no carotid bruit or JVD  Back: Symmetric, no  tenderness,no soft tissue tenderness  Lungs:IMPROVED Diminished bilaterally R>L with some dullness to percussion  Heart: Regular rate and rhythm, S1, S2 normal, no murmur, rub or gallop  Abdomen:  decreased-tender,no active bowel sounds, Distended , no hepatosplenomegaly  Extremities: pos peripheral edema / Hx Right leg amputation  Skin: Skin color, texture normal, no rashes   Neurologic: Alert and oriented X3 , cranial nerves intact, sensory and motor normal,    ECG:    LABS:                        7.7    4.32  )-----------( 203      ( 24 Jul 2018 07:08 )             25.3   07-24    142  |  106  |  29<H>  ----------------------------<  89  3.6   |  27  |  3.46<H>    Ca    7.4<L>      24 Jul 2018 07:08  Phos  5.4     07-23  Mg     1.9     07-24    TPro  4.7<L>  /  Alb  2.3<L>  /  TBili  x   /  DBili  x   /  AST  x   /  ALT  x   /  AlkPhos  x   07-23                                   8.0    6.95  )-----------( 247      ( 22 Jul 2018 06:19 )             26.7   07-22    144  |  111<H>  |  33<H>  ----------------------------<  104<H>  4.1   |  25  |  4.10<H>    Ca    7.3<L>      22 Jul 2018 06:19  Mg     1.8     07-22    TPro  4.4<L>  /  Alb  2.1<L>  /  TBili  0.4  /  DBili  x   /  AST  7<L>  /  ALT  <6<L>  /  AlkPhos  48  07-22               7.5    9.09  )-----------( 225      ( 21 Jul 2018 06:08 )             24.5   07-21    139  |  107  |  24<H>  ----------------------------<  101<H>  3.2<L>   |  28  |  3.31<H>    Ca    7.5<L>      21 Jul 2018 06:08  Phos  4.1     07-20  Mg     1.9     07-21    TPro  x   /  Alb  2.1<L>  /  TBili  x   /  DBili  x   /  AST  x   /  ALT  x   /  AlkPhos  x   07-20                                   8.4    10.13 )-----------( 199      ( 17 Jul 2018 05:41 )             26.8   07-17    142  |  108  |  19  ----------------------------<  89  3.4<L>   |  25  |  3.02<H>    Ca    7.2<L>      17 Jul 2018 05:41                 8.4    8.91  )-----------( 181      ( 15 Jul 2018 06:16 )             26.4   07-15    142  |  107  |  23  ----------------------------<  101<H>  3.3<L>   |  26  |  3.04<H>    Ca    7.5<L>      15 Jul 2018 06:16                            8.2    10.15 )-----------( 183      ( 13 Jul 2018 05:14 )             26.1   07-13    143  |  107  |  26<H>  ----------------------------<  89  3.5   |  26  |  2.62<H>    Ca    7.2<L>      13 Jul 2018 05:14  Phos  4.3     07-12  Mg     2.0     07-12    TPro  x   /  Alb  2.0<L>  /  TBili  x   /  DBili  x   /  AST  x   /  ALT  x   /  AlkPhos  x   07-12                            8.3    10.62 )-----------( 202      ( 12 Jul 2018 05:17 )             26.8   07-12    141  |  107  |  33<H>  ----------------------------<  66<L>  3.9   |  23  |  2.84<H>    Ca    7.4<L>      12 Jul 2018 05:17  Phos  4.3     07-12  Mg     2.0     07-12    TPro  x   /  Alb  2.0<L>  /  TBili  x   /  DBili  x   /  AST  x   /  ALT  x   /  AlkPhos  x   07-12                          8.8    20.36 )-----------( 265      ( 10 Jul 2018 05:47 )             28.0   07-11    142  |  108  |  52<H>  ----------------------------<  80  4.0   |  23  |  3.82<H>    Ca    7.0<L>      11 Jul 2018 06:20  Phos  4.9     07-11  Mg     1.9     07-11                              8.1    20.78 )-----------( 239      ( 09 Jul 2018 05:48 )             25.6     07-09    144  |  114<H>  |  65<H>  ----------------------------<  113<H>  4.5   |  17<L>  |  3.90<H>    Ca    7.1<L>      09 Jul 2018 05:48  Mg     1.9     07-09      CARDIAC MARKERS ( 07 Jul 2018 17:51 )  0.024 ng/mL / x     / x     / x     / x                  ABG - ( 08 Jul 2018 15:47 )  pH, Arterial: 7.18  pH, Blood: x     /  pCO2: 39    /  pO2: 68    / HCO3: 14    / Base Excess: -13.1 /  SaO2: 92            < from: Xray Chest 1 View-PORTABLE IMMEDIATE (07.10.18 @ 15:50) >  INTERPRETATION:  CLINICAL INDICATION:  R  I J dialysis line placement    TECHNIQUE: Single view AP chest radiograph was performed.    COMPARISON:  Chest radiograph performed earlier on the same day,   7/10/2018 at 8:28 AM and chest radiograph dated 7/6/2018.    FINDINGS:    Interval placement of right-sided jugular central venous catheter with   tip projecting over the right atrium. No evidence forpneumothorax.    There is persistent mild pulmonary vascular congestion. There is trace   fluid within the right minor fissure.    The cardiac silhouette size is stable. Diffuse aortic calcifications are   noted    Redemonstration of anchor screw within the right humeral head.     IMPRESSION:    Interval placement of right-sided IJ CVC, with tip projecting over the   right atrium. No pneumothorax.     < end of copied text >          RADIOLOGY & ADDITIONAL STUDIES:  IMPRESSION:     Severe pancolitis consistent with pseudomembranous colitis.    Mild pulmonary edema.    Small loculated right and trace layering left pleural effusions.    < from: Xray Chest 1 View- PORTABLE-Routine (07.20.18 @ 15:52) >    PROCEDURE DATE:  07/20/2018          INTERPRETATION:  History: Acute on chronic kidney disease on   hemodialysis. Oxygen desaturation.    Single view of the chest was performed.    Comparison is made to July 16, 2018.    Findings:    There is a right-sided hemodialysis catheter in place tip at cavoatrial   junction. The lungs are clear. There is moderate improvement of   previously noted pulmonary vascular congestion. Heart size within normal   limits. Patient is status post right rotator cuff repair.    Impression:    Improvement of previously noted pulmonary vascular congestion.    < end of copied text >  LUNG BASES: Small bilateral pleural effusions. Loculated fluid versus   mucoid impaction at the right lung base.    IMPRESSION:     Stable appearance of pancolitis consistent with pseudomembranous colitis.   No pneumatosis or free air.    Increasing moderate ascites.    Anasarca.    Urinary bladder cystitis. Correlate with UA.

## 2018-07-26 NOTE — PROGRESS NOTE ADULT - PROBLEM SELECTOR PLAN 1
- vanc 125 mg TID recommended by GI and discussed -Then 3 times a day for 2 weeks, then twice a day for one week, then daily for one week, then every other day for 1 week, then every 3rd day for 1 week. Recommend follow-up with Dr. Grullon after d/c  - s/p Flagyl 10 days  - ID appreciated, continue PO vancomycin  - GI appreciated, taper regimen structured  - Colorectal Consult appreciated, would not consider intervention at this time  -Paracentesis 7/23/18- 2800 cc removed, cytopathology report-no malig, inflammatory cells, PMNs seen on analysis of fluid  -SAAG= .6 (<1.1), therefore not pointing towards HF, or portal hypertension--DDx:  serositis

## 2018-07-26 NOTE — PROGRESS NOTE ADULT - SUBJECTIVE AND OBJECTIVE BOX
82 y/o M w/pmhx of Afib (CHADVASC 3, not on AC, GI bleed), CHF, CAD s/p stents, COPD, recent PNA on abx, upper GI bleed (duodenal)  BIB EMS from [facility] for fever, abd pain, and diarrhea that began 3 weeks ago. Was in the hospital for PNA tx and was later dx with C-diff and started on a course of PO vancomycin for 10 days for the C-diff. States that he has had diarrhea since before the course of abx. Pain has been increased for the past few weeks and is characterized as a cramping feeling. Daughters also note that there is increased distension. Also notes chest pain characterized as a cramping in the central chest. 83% O2 sat noted morning of admission on 7/6/18, at the assisted living facility. Takes O2 routinely at night but not during the day.    7/25: seen and eval. patient very deconditioned and frail. Currently getting HD. Paracentesis reviewed. No overnight fevers, chills or shakes. Labs and vitals reviewed. Patient denies any HA, CP, SOB.     7/26: Patient     ROS:  as per HPI    PHYSICAL EXAM:  T(C): 36.7 (26 Jul 2018 05:31), Max: 36.8 (25 Jul 2018 12:36)  T(F): 98.1 (26 Jul 2018 05:31), Max: 98.3 (25 Jul 2018 17:06)  HR: 80 (26 Jul 2018 08:58) (63 - 84)  BP: 119/27 (26 Jul 2018 05:31) (102/21 - 133/41)  RR: 18 (26 Jul 2018 05:31) (15 - 18)  SpO2: 97% (26 Jul 2018 05:31) (97% - 97%)  Constitutional: very deconditioned, frail, sick appearing  HEENT: PERR, EOMI, Normal Hearing, MMM  Neck: no JVD  Respiratory: Breath sounds are clear bilaterally, No wheezing, rales or rhonchi  Cardiovascular: S1 and S2, regular rate and rhythm, no Murmurs, gallops or rubs  Gastrointestinal:  Distended+, no rigidity, no rebound  Extremities: LLE lesion with dressings c/d/i, RLE s/p AKA +  Vascular: 2+ peripheral pulses  Neurological: A/O x 3, no focal deficits  Musculoskeletal: unable to assess  Skin: Incontinence associated Dermatitis with Rectal tube+ output green/loose stool    LABS:     CBC Full  -  ( 26 Jul 2018 07:00 )  WBC Count : 3.47 K/uL  Hemoglobin : 7.5 g/dL  Hematocrit : 24.7 %  Platelet Count - Automated : 174 K/uL    142  |  107  |  28<H>  ----------------------------<  74  3.4<L>   |  25  |  3.21<H>    Ca    7.5<L>      26 Jul 2018 07:00  Phos  2.9     07-26  Mg     1.7     07-26    TPro  x   /  Alb  2.3<L>  /  TBili  x   /  DBili  x   /  AST  x   /  ALT  x   /  AlkPhos  x   07-26      # Pseudomembranous colitis  - Due to severe C. difficile, patient is anticipated to be on a prolonged antibiotic taper  - Taper vancomycin, please dec vanco to 125 qid  - Vancomycin 125 mg 4 times a day, one more week  - Then 3 times a day for 2 weeks, then twice a day for one week, then daily for one week, then every other day for 1 week, then every 3rd day for 1 week.  - tolerating antibiotics without rashes or side effects   - continue contact isolation  - limit antibiotics exposure.     # Acute respiratory failure with hypoxia   - continue to maintain fluid balance with HD MWF   - control HFpEF with medical management   - O2 as needed  - Pulm appreciated, add nebs  - Incentive Spirometry  - CXR: improvement of Pulmonary Vascular Congestion  - CT Abdomen: Moderate Ascites - contributing to desaturation;   - 3L NC O2 at night and on exertion as needed.     # Renal insufficiency  -  s/p Tunnel Cath on 7/16/18, patient currently HD dependent  - patient currently on HD.     # Afib.  Plan: - Currently sinus  - No AC due to hx of Severe GI bleed  - BP does not tolerate Cardizem or Beta Blocker.     # Mobitz type 2 second degree AV block  - non recurrent, now in sinus rhythm RRR  - EP consult appreciated: likely vagal, may be 2/2 SHAYY. 84 y/o M w/pmhx of Afib (CHADVASC 3, not on AC, GI bleed), CHF, CAD s/p stents, COPD, recent PNA on abx, upper GI bleed (duodenal)  BIB EMS from [facility] for fever, abd pain, and diarrhea that began 3 weeks ago. Was in the hospital for PNA tx and was later dx with C-diff and started on a course of PO vancomycin for 10 days for the C-diff. States that he has had diarrhea since before the course of abx. Pain has been increased for the past few weeks and is characterized as a cramping feeling. Daughters also note that there is increased distension. Also notes chest pain characterized as a cramping in the central chest. 83% O2 sat noted morning of admission on 7/6/18, at the assisted living facility. Takes O2 routinely at night but not during the day.    7/25: seen and eval. patient very deconditioned and frail. Currently getting HD. Paracentesis reviewed. No overnight fevers, chills or shakes. Labs and vitals reviewed. Patient denies any HA, CP, SOB.     7/26: Patient seen and eval. hemodynamically stable. very frail. complaining of (R) sided abdominal pain 6/10, comfortable with positioning. No fevers, chills. Care discussed with Dr. Palacio. Care discussed with the family at the bedside. Patient denies any HA, CP, SOB.     ROS:  as per HPI    PHYSICAL EXAM:  ICU Vital Signs Last 24 Hrs  T(C): 36.7 (26 Jul 2018 11:42), Max: 36.8 (25 Jul 2018 17:06)  T(F): 98.1 (26 Jul 2018 11:42), Max: 98.3 (25 Jul 2018 17:06)  HR: 84 (26 Jul 2018 11:42) (71 - 84)  BP: 117/29 (26 Jul 2018 11:42) (117/29 - 133/41)  RR: 18 (26 Jul 2018 11:42) (16 - 18)  SpO2: 97% (26 Jul 2018 11:42) (97% - 97%)    Constitutional: very deconditioned, frail, sick appearing  HEENT: PERR, EOMI, Normal Hearing, MMM  Neck: no JVD  Respiratory: Breath sounds are clear bilaterally, No wheezing, rales or rhonchi  Cardiovascular: S1 and S2, regular rate and rhythm, no Murmurs, gallops or rubs  Gastrointestinal:  Distended+, no rigidity, no rebound  Extremities: LLE lesion with dressings c/d/i, RLE s/p AKA +  Vascular: 2+ peripheral pulses  Neurological: A/O x 3, no focal deficits  Musculoskeletal: unable to assess  Skin: Incontinence associated Dermatitis with Rectal tube+ output green/loose stool    LABS:       CBC Full  -  ( 26 Jul 2018 07:00 )  WBC Count : 3.47 K/uL  Hemoglobin : 7.5 g/dL  Hematocrit : 24.7 %  Platelet Count - Automated : 174 K/uL    142  |  107  |  28<H>  ----------------------------<  74  3.4<L>   |  25  |  3.21<H>    Ca    7.5<L>      26 Jul 2018 07:00  Phos  2.9     07-26  Mg     1.7     07-26    TPro  x   /  Alb  2.3<L>  /  TBili  x   /  DBili  x   /  AST  x   /  ALT  x   /  AlkPhos  x   07-26    Mg     1.7     07-26    # Pseudomembranous colitis  - Due to severe C. difficile, patient is anticipated to be on a prolonged antibiotic taper  - Taper vancomycin, please dec vanco to 125 qid  - Vancomycin 125 mg 4 times a day, one more week  - Then 3 times a day for 2 weeks, then twice a day for one week, then daily for one week, then every other day for 1 week, then every 3rd day for 1 week.  - tolerating antibiotics without rashes or side effects   - continue contact isolation      # Acute respiratory failure with hypoxia   - continue to maintain fluid balance with HD MWF   - control HFpEF with medical management   - O2 as needed  - Pulm appreciated, add nebs  - Incentive Spirometry  - CXR: improvement of Pulmonary Vascular Congestion  - CT Abdomen: Moderate Ascites - contributing to desaturation;   - 3L NC O2 at night and on exertion as needed.     # Renal insufficiency  -  s/p Tunnel Cath on 7/16/18, patient currently HD dependent  - patient currently on HD.     # Afib.  Plan: - Currently sinus  - No AC due to hx of Severe GI bleed  - BP does not tolerate Cardizem or Beta Blocker.     # Mobitz type 2 second degree AV block  - non recurrent, now in sinus rhythm RRR  - EP consult appreciated: likely vagal, may be 2/2 SHAYY.

## 2018-07-26 NOTE — PROGRESS NOTE ADULT - SUBJECTIVE AND OBJECTIVE BOX
Patient is a 83y old  Male who presents with a chief complaint of Fever/Diarrhea (20 Jul 2018 13:30)      HPI:  Pt is a 84 y/o M w/pmhx of Afib, CHF, CAD s/p stents, COPD, PNA, upper GI bleed (duodenal)  BIB EMS from [facility] living for fever, abd pain, and diarrhea that began 3 weeks ago. Was in the hospital for PNA tx and was later dx with C-diff and started on a course of PO vancomycin for 10 days for the C-diff. States that he has had diarrhea since before the course of abx. Pain has been increased for the past few weeks and is characterized as a cramping feeling. Daughters also note that there is increased distension. Also notes chest pain characterized as a cramping in the central chest. 83% O2 sat noted this morning at the assisted living facility. Takes O2 routinely at night but not during the day. (06 Jul 2018 23:55)    History per patient and daughter.  Patient with some mild lower abdominal cramping. Increases after eating.  Stool is continuing with about 6 a day, the patient, increased informed.  Abdominal pain, may increase after eating.  Denies any any vomiting but does have some increased symptoms of nauseousness after eating. Thinks that it is getting a little bit better.        PAST MEDICAL & SURGICAL HISTORY:  Diastolic CHF  Peripheral vascular disease  Afib  Anemia  CKD (chronic kidney disease)  COPD (chronic obstructive pulmonary disease)  SHAYY (obstructive sleep apnea)  Sepsis, due to unspecified organism: 2/2 poorly healing wounds b/l  Dyspepsia: On moderate exertion.  Sleep apnea, obstructive: Requires home 02 therapy, and treatment with BIPAP  Atelectasis  Pleural effusion, bilateral  Respiratory failure  Peripheral edema  CRI (chronic renal insufficiency)  Gout  Benign prostatic hypertrophy  Spinal stenosis  Hypercholesterolemia  GERD (gastroesophageal reflux disease)  CAD (coronary artery disease)  Hypertension  S/P angioplasty with stent  Cataract of left eye  Prostate: Surgery green light procedure.  S/P rotator cuff surgery: Right  S/P angioplasty      MEDICATIONS  (STANDING):  ALBUTerol/ipratropium for Nebulization. 3 milliLiter(s) Nebulizer once  allopurinol 100 milliGRAM(s) Oral daily  aspirin  chewable 81 milliGRAM(s) Oral daily  buDESOnide   0.5 milliGRAM(s) Respule 0.5 milliGRAM(s) Inhalation two times a day  cholestyramine Powder (Sugar-Free) 4 Gram(s) Oral two times a day  diltiazem    Tablet 30 milliGRAM(s) Oral every 6 hours  doxazosin 8 milliGRAM(s) Oral at bedtime  epoetin nelly Injectable 3000 Unit(s) IV Push <User Schedule>  epoetin nelly Injectable 4000 Unit(s) IV Push <User Schedule>  finasteride 5 milliGRAM(s) Oral daily  heparin  Injectable 5000 Unit(s) SubCutaneous every 12 hours  metoclopramide 5 milliGRAM(s) Oral three times a day  pantoprazole    Tablet 40 milliGRAM(s) Oral every 12 hours  simvastatin 10 milliGRAM(s) Oral at bedtime  sodium ferric gluconate complex Injectable 125 milliGRAM(s) IV Push <User Schedule>  vancomycin    Solution 500 milliGRAM(s) Oral every 6 hours    MEDICATIONS  (PRN):  acetaminophen   Tablet 650 milliGRAM(s) Oral every 8 hours PRN For Temp greater than 38 C (100.4 F)  acetaminophen   Tablet. 650 milliGRAM(s) Oral every 8 hours PRN Mild Pain (1 - 3)  albumin human 25% IVPB 50 milliLiter(s) IV Intermittent <User Schedule> PRN sbp<=120mmhg  ALBUTerol   0.5% 2.5 milliGRAM(s) Nebulizer every 4 hours PRN Shortness of Breath and/or Wheezing  diphenhydrAMINE   Capsule 25 milliGRAM(s) Oral at bedtime PRN sleep  HYDROmorphone  Injectable 0.5 milliGRAM(s) IV Push every 6 hours PRN Severe Pain (7 - 10)  ondansetron Injectable 4 milliGRAM(s) IV Push every 6 hours PRN Nausea and/or Vomiting      Allergies    Zosyn (Rash)    Intolerances        SOCIAL HISTORY:NC    FAMILY HISTORY:  No pertinent family history in first degree relatives      REVIEW OF SYSTEMS:    CONSTITUTIONAL: No weakness, fevers or chills  EYES/ENT: No visual changes;  No vertigo or throat pain   NECK: No pain or stiffness  RESPIRATORY: No cough, wheezing, hemoptysis; No shortness of breath  CARDIOVASCULAR: No chest pain or palpitations  GENITOURINARY: No dysuria, frequency or hematuria  NEUROLOGICAL: No numbness or weakness  SKIN: No itching, burning, rashes, or lesions   All other review of systems is negative unless indicated above.    Vital Signs Last 24 Hrs  T(C): 36.6 (25 Jul 2018 12:50), Max: 36.8 (24 Jul 2018 17:15)  T(F): 97.8 (25 Jul 2018 12:50), Max: 98.3 (24 Jul 2018 22:04)  HR: 77 (25 Jul 2018 14:19) (63 - 84)  BP: 126/47 (25 Jul 2018 14:19) (102/21 - 133/39)  BP(mean): --  RR: 16 (25 Jul 2018 14:19) (16 - 18)  SpO2: 97% (25 Jul 2018 12:36) (95% - 99%)    PHYSICAL EXAM:    Constitutional: NAD, well-developed  HEENT: EOMI, throat clear  Neck: No LAD, supple  Respiratory: CTA and P  Cardiovascular: S1 and S2, RRR, no M  Gastrointestinal: BS+, soft, NT/ND, neg HSM,  Extremities: No peripheral edema, neg clubing, cyanosis  Vascular: 2+ peripheral pulses  Neurological: A/O x 3, no focal deficits  Psychiatric: Normal mood, normal affect  Skin: No rashes    LABS:  CBC Full  -  ( 25 Jul 2018 07:28 )  WBC Count : 4.02 K/uL  Hemoglobin : 7.9 g/dL  Hematocrit : 26.0 %  Platelet Count - Automated : 205 K/uL  Mean Cell Volume : 95.2 fl  Mean Cell Hemoglobin : 28.9 pg  Mean Cell Hemoglobin Concentration : 30.4 gm/dL  Auto Neutrophil # : x  Auto Lymphocyte # : x  Auto Monocyte # : x  Auto Eosinophil # : x  Auto Basophil # : x  Auto Neutrophil % : x  Auto Lymphocyte % : x  Auto Monocyte % : x  Auto Eosinophil % : x  Auto Basophil % : x    07-25    141  |  106  |  43<H>  ----------------------------<  81  3.5   |  26  |  4.31<H>    Ca    7.6<L>      25 Jul 2018 07:28  Mg     1.9     07-25    CBC today noted with mild dec WBC          RADIOLOGY & ADDITIONAL STUDIES:  < from: CT Abdomen and Pelvis No Cont (07.21.18 @ 11:15) >  EXAM:  CT ABDOMEN AND PELVIS                            PROCEDURE DATE:  07/21/2018          INTERPRETATION:  CT ABDOMEN AND PELVIS    HISTORY:  abdominal distension, pseudomembranous colitis    Technique: CT of the abdomen and pelvis is performed without oral or   intravenous contrast. Axial images are supplemented with coronal and   sagittal reformations. This study was performed using automatic exposure   control (radiation dose reduction software) to obtain a diagnostic image   quality scanwith patient dose as low as reasonably achievable.    Contrast:     None    Comparison: CT abdomen and pelvis 7/9/2018    Findings:  LIVER: Unchanged small right lobe hypoattenuation.  SPLEEN: Normal.  PANCREAS: Normal.  GALLBLADDER/BILIARY TREE: Nondilated. Gallstones.  ADRENALS: Normal.  KIDNEYS: No hydronephrosis or urinary tract calculi.  LYMPHADENOPATHY/RETROPERITONEUM: No adenopathy.  VASCULATURE: Aortoiliac atherosclerosis is severe without aneurysm. IVC   filter.  BOWEL: Gastric clips again noted at the fundus. No bowel obstruction.   Stable appearance of pancolitis with moderate colonic wall thickening and   pericolonic fluid. No pneumatosis or free air.  PELVIC VISCERA: Diffuse urinary bladder wall thickening and perivesical   stranding. Sedimentation level in the bladder lumen.  PELVIC LYMPH NODES: No pelvic adenopathy.  PERITONEUM/ABDOMINAL WALL: No free air. Increasing moderate ascites.   Diffuse body wall edema.  SKELETAL: No acute bony abnormality.  LUNG BASES: Small bilateral pleural effusions. Loculated fluid versus   mucoid impaction at the right lung base.    IMPRESSION:     Stable appearance of pancolitis consistent with pseudomembranous colitis.   No pneumatosis or free air.    Increasing moderate ascites.    Anasarca.    Urinary bladder cystitis. Correlate with UA.              < end of copied text >

## 2018-07-26 NOTE — PROGRESS NOTE ADULT - PROBLEM SELECTOR PLAN 3
- s/p Tunnel Cath on 7/16/18, patient currently HD dependent  - Nephro consult appreciated: cont HD, Creatinine improving

## 2018-07-26 NOTE — PROGRESS NOTE ADULT - SUBJECTIVE AND OBJECTIVE BOX
84 y/o M w/pmhx of Afib, CHF, CAD s/p stents, COPD, recent PNA on abx, upper GI bleed (duodenal)  BIB EMS from [facility] for fever, abd pain, and diarrhea that began 3 weeks ago. Was in the hospital for PNA tx and was later dx with C-diff and started on a course of PO vancomycin for 10 days for the C-diff. States that he has had diarrhea since before the course of abx. Pain has been increased for the past few weeks and is characterized as a cramping feeling. Daughters also note that there is increased distension. Also notes chest pain characterized as a cramping in the central chest. 83% O2 sat noted morning of admission on 7/6/18, at the assisted living facility. Takes O2 routinely at night but not during the day.    7/26/18 - Patient seen and examined at bedside. Hemodynamically stable, no events overnight. Patient with increased RLQ pain, and tenderness, GI appreciated would like to monitor patient for pain, even though patient is likely in pain 2/2 colitis and recent inflammation. Abd scan done recently no reason to repeat. Patient care d/w Nephrology, still HD dependent but Cr coming down, and unsure if patient is voiding appropriately.    Vital Signs Last 24 Hrs  T(C): 36.7 (26 Jul 2018 11:42), Max: 36.8 (25 Jul 2018 17:06)  T(F): 98.1 (26 Jul 2018 11:42), Max: 98.3 (25 Jul 2018 17:06)  HR: 84 (26 Jul 2018 11:42) (71 - 84)  BP: 117/29 (26 Jul 2018 11:42) (117/29 - 133/41)  BP(mean): --  RR: 18 (26 Jul 2018 11:42) (16 - 18)  SpO2: 97% (26 Jul 2018 11:42) (97% - 97%)    PHYSICAL EXAM:  Constitutional: NAD  HEENT: PERR, EOMI  Neck: Soft and supple, No LAD,   Respiratory: Breath sounds are clear bilaterally, No wheezing, rales or rhonchi  Cardiovascular: S1 and S2, regular rate and rhythm, no Murmurs, gallops or rubs  Gastrointestinal: Bowel Sounds present, soft, tender to palpation RLQ +, Distended+, no rigidity, no rebound  Extremities: LLE lesion with dressings c/d/i, RLE s/p AKA +  Vascular: 2+ peripheral pulses  Neurological: A/O x 3, no focal deficits  Musculoskeletal: unable to assess  Skin: Incontinence associated Dermatitis     MEDICATIONS  (STANDING):  ALBUTerol/ipratropium for Nebulization. 3 milliLiter(s) Nebulizer once  allopurinol 100 milliGRAM(s) Oral daily  aspirin  chewable 81 milliGRAM(s) Oral daily  buDESOnide   0.5 milliGRAM(s) Respule 0.5 milliGRAM(s) Inhalation two times a day  cholestyramine Powder (Sugar-Free) 4 Gram(s) Oral two times a day  diltiazem    Tablet 30 milliGRAM(s) Oral every 6 hours  doxazosin 8 milliGRAM(s) Oral at bedtime  epoetin nelly Injectable 3000 Unit(s) IV Push <User Schedule>  epoetin nelly Injectable 4000 Unit(s) IV Push <User Schedule>  finasteride 5 milliGRAM(s) Oral daily  heparin  Injectable 5000 Unit(s) SubCutaneous every 12 hours  metoclopramide 10 milliGRAM(s) Oral three times a day  pantoprazole    Tablet 40 milliGRAM(s) Oral every 12 hours  potassium chloride    Tablet ER 20 milliEquivalent(s) Oral every 2 hours  simvastatin 10 milliGRAM(s) Oral at bedtime  sodium ferric gluconate complex Injectable 125 milliGRAM(s) IV Push <User Schedule>  vancomycin    Solution 125 milliGRAM(s) Oral every 6 hours    MEDICATIONS  (PRN):  acetaminophen   Tablet 650 milliGRAM(s) Oral every 8 hours PRN For Temp greater than 38 C (100.4 F)  acetaminophen   Tablet. 650 milliGRAM(s) Oral every 8 hours PRN Mild Pain (1 - 3)  albumin human 25% IVPB 50 milliLiter(s) IV Intermittent <User Schedule> PRN sbp<=120mmhg  ALBUTerol   0.5% 2.5 milliGRAM(s) Nebulizer every 4 hours PRN Shortness of Breath and/or Wheezing  diphenhydrAMINE   Capsule 25 milliGRAM(s) Oral at bedtime PRN sleep  HYDROmorphone  Injectable 0.5 milliGRAM(s) IV Push every 6 hours PRN Severe Pain (7 - 10)  ondansetron Injectable 4 milliGRAM(s) IV Push every 6 hours PRN Nausea and/or Vomiting                          7.5    3.47  )-----------( 174      ( 26 Jul 2018 07:00 )             24.7   07-26    142  |  107  |  28<H>  ----------------------------<  74  3.4<L>   |  25  |  3.21<H>    Ca    7.5<L>      26 Jul 2018 07:00  Phos  2.9     07-26  Mg     1.7     07-26    TPro  x   /  Alb  2.3<L>  /  TBili  x   /  DBili  x   /  AST  x   /  ALT  x   /  AlkPhos  x   07-26      Blood Culture:   Culture - Urine (07.10.18 @ 16:30)    Specimen Source: .Urine Catheterized    Culture Results:   <10,000 CFU/ml  Normal Urogenital ravinder present    Culture - Blood (07.06.18 @ 13:42)    Specimen Source: .Blood Blood-Peripheral    Culture Results:   No growth at 5 days.          RADIOLOGY/EKG:    < from: CT Abdomen and Pelvis No Cont (07.21.18 @ 11:15) >  IMPRESSION:     Stable appearance of pancolitis consistent with pseudomembranous colitis.   No pneumatosis or free air.    Increasing moderate ascites.    Anasarca.    Urinary bladder cystitis. Correlate with UA.    < end of copied text >      < from: Xray Chest 1 View- PORTABLE-Urgent (07.16.18 @ 18:34) >  IMPRESSION:    Findings suggesting persistent mild CHF with mild pulmonary vascular   congestion and trace fluid in the right minor fissure.    Stable right CVC.    < end of copied text >    < from: Transthoracic Echocardiogram (07.10.18 @ 10:21) >   Impression     Summary     Fibrocalcific changes noted to the mitral valve leaflets with preserved   leaflet excursion.   Moderate (2+) mitral regurgitation is present.   The left atrium is moderately dilated.   Mild concentric left ventricular hypertrophy is present.   Estimated left ventricular ejection fraction is 55-60 %.    < end of copied text >    < from: CT Abdomen and Pelvis No Cont (07.21.18 @ 11:15) >  IMPRESSION:     Stable appearance of pancolitis consistent with pseudomembranous colitis.   No pneumatosis or free air.    Increasing moderate ascites.    < end of copied text >    FINDINGS:  1. Ascites on preprocedure ultrasound.  2. Access of the peritoneal cavity under direct ultrasound guidance with   a 5 French Yueh needle   3. 2800 cc of clear yellow fluid aspirated  4. Resolution of ascites on postprocedure ultrasound    IMPRESSION:  Successful ultrasound-guided paracentesis    PLAN:   1. Return to interventional radiology for repeat paracentesis as needed.  2. Follow laboratory studies as ordered    Peritoneal fluid analysis  PMN seen    Cytopathology of Ascitic fluid  negative for malignant cells, acute and chronic nflammationary cells present     Serum   Albumin= 2.3  Protein= 4.7   LDH= 160       Ascitis Fluid  LDH= 83, Protein= 2.8, Albumin= 1.7     DVT PPX: NO AC due to severe GI bleed in past-- on Heparin subQ 5000U q12h

## 2018-07-26 NOTE — PROGRESS NOTE ADULT - SUBJECTIVE AND OBJECTIVE BOX
82 y/o M w/pmhx of Afib, CHF, CAD s/p stents, COPD, recent PNA on abx, upper GI bleed (duodenal)  BIB EMS from [facility] assisted living for fever, abd pain, and diarrhea that began 3 weeks ago. Was in the hospital for PNA tx and was later dx with C-diff and started on a course of PO vancomycin for 10 days for the C-diff. States that he has had diarrhea since before the course of abx. Pain has been increased for the past few weeks and is characterized as a cramping feeling. Daughters also note that there is increased distension. Also notes chest pain characterized as a cramping in the central chest. 83% O2 sat noted morning of admission on 7/6/18, at the assisted living facility. Takes O2 routinely at night but not during the day.    7/25: seen and eval. patient very deconditioned and frail. Currently getting HD. Paracentesis reviewed. No overnight fevers, chills or shakes. Labs and vitals reviewed. Patient denies any HA, CP, SOB.     ROS:  as per HPI    PHYSICAL EXAM:  ICU Vital Signs Last 24 Hrs  T(C): 36.7 (26 Jul 2018 05:31), Max: 36.8 (25 Jul 2018 12:36)  T(F): 98.1 (26 Jul 2018 05:31), Max: 98.3 (25 Jul 2018 17:06)  HR: 80 (26 Jul 2018 08:58) (63 - 84)  BP: 119/27 (26 Jul 2018 05:31) (102/21 - 133/41)  RR: 18 (26 Jul 2018 05:31) (15 - 18)  SpO2: 97% (26 Jul 2018 05:31) (97% - 97%)  Constitutional: very deconditioned, frail, sick appearing  HEENT: PERR, EOMI, Normal Hearing, MMM  Neck: no JVD  Respiratory: Breath sounds are clear bilaterally, No wheezing, rales or rhonchi  Cardiovascular: S1 and S2, regular rate and rhythm, no Murmurs, gallops or rubs  Gastrointestinal:  Distended+, no rigidity, no rebound  Extremities: LLE lesion with dressings c/d/i, RLE s/p AKA +  Vascular: 2+ peripheral pulses  Neurological: A/O x 3, no focal deficits  Musculoskeletal: unable to assess  Skin: Incontinence associated Dermatitis with Rectal tube+ output green/loose stool      LABS:     CBC Full  -  ( 26 Jul 2018 07:00 )  WBC Count : 3.47 K/uL  Hemoglobin : 7.5 g/dL  Hematocrit : 24.7 %  Platelet Count - Automated : 174 K/uL    142  |  107  |  28<H>  ----------------------------<  74  3.4<L>   |  25  |  3.21<H>    Ca    7.5<L>      26 Jul 2018 07:00  Phos  2.9     07-26  Mg     1.7     07-26    TPro  x   /  Alb  2.3<L>  /  TBili  x   /  DBili  x   /  AST  x   /  ALT  x   /  AlkPhos  x   07-26      # Pseudomembranous colitis  - Due to severe C. difficile, patient is anticipated to be on a prolonged antibiotic taper  - day #17 vancomycin po 500 mg po q 6 hours  - would complete another 6 days po vancomycin  - completed 10 days iv flagyl  - tolerating antibiotics without rashes or side effects   - continue contact isolation  - limit antibiotics exposure.     # Acute respiratory failure with hypoxia   - continue to maintain fluid balance with HD MWF   - control HFpEF with medical management   - O2 as needed  - Pulm appreciated, add nebs  - Incentive Spirometry  - CXR: improvement of Pulmonary Vascular Congestion  - CT Abdomen: Moderate Ascites - contributing to desaturation;   - 3L NC O2 at night and on exertion as needed.     # Renal insufficiency  -  s/p Tunnel Cath on 7/16/18, patient currently HD dependent  - patient currently on HD.     # Afib.  Plan: - Currently sinus  - No AC due to hx of Severe GI bleed  - BP does not tolerate Cardizem or Beta Blocker.     # Mobitz type 2 second degree AV block  - non recurrent, now in sinus rhythm RRR  - EP consult appreciated: likely vagal, may be 2/2 SHAYY.

## 2018-07-26 NOTE — PROGRESS NOTE ADULT - ASSESSMENT
82 y/o wm with hx of ckd stage 3 ( scr 1.5-1.7) with ARYA due to volume depletion from CDiff associated colitis and diarrhea in presence of diuretic use PTA.  Now with non anion gap metabolic acidosis and worsening renal parameters.  CXR with mild CHF with hx of diastolic CHF.    PLAN  - obtain abd and lactate  - will change ivf and add bicarbonate and keep at current rate  - hold lasix done.   - fu uop and scr closely and will monitor respiratory status  - bp and hr stable now, cardizem adjusted and lopressor added    7/9 MK  - ARYA: worsening renal parameters with metabolic acidosis, non ag.  Previous lactate WNL and since placed on bicarbonate drip  fu repeat abg today.  Increase IVF rate  possibility of HD discussed with enio, hcp daughter, pt has been agreeable in past.   DC hydralazine  dc morphine and change to dilaudid  - Cdiff with rising scr and worsening abd distension: CRS consult ongoing  possible repeat ct scan  DW Dr ballesteros    7/10  Anuric  anasarca  Non anion gap acidosis on bicarb drip  ARYA, anuric  CKD 3 history  d/w Family, and pt agreeable  called ICU for line placement and to dialyze the pt in ICU for monitoring    7/10  HD initiated via R temp HD catheter  tolerating well  no complaints  good abf    7/11 SY  --ARYA with Anasarca.  Urine output slightly improved.  However, remains quite fluid overloaded.  Will plan to continue HD until fluid status is much improved.  HD order written.  3 hour tx today.  Will aim to remove 2.5 kg as tolerated.  --C DIff colitis ; continue to monitor closely.    7/12 SY  --ARYA with Anasarca.   Remains Oligo-anuric.    --HD with goal of 2.5 kg UF again today.  --C Diff colitis.   Clinically improved   Continue to monitor.    7/13  The patient was dialyzed 3 days in a row this week  Discussed with the patient's daughter and hospitalist, intensivist  Will skip dialysis today, no urgent need for dialysis  We'll dialyze the patient tomorrow on Saturday and then skip Sunday to dialyze the patient again on Monday.    7/14  We'll dialyze against 4 K bath  Case discussed with the patient's daughter  May need a permanent catheter by Monday      7/15  Dialyze with a 4K bath  Potassium is 3.3 most likely from the diarrhea  Schedule for permacath placement after dialysis on Monday or Tuesday  Patient is comfortable no complications noted at this time    7/16 MK  - ARYA/ CKD stage 3 remains oliguirc and HD dependent.  LImited UF at HD toleated with the   hypokalemia corrected with HD.  Permcath planned today  - Cdiff: on PO vanc.  improved leukocytosis and abd distension     7/17  s/p perm cath  HD tomorrow  4 K bath  replace K   overall stable    7/18 SY  --ARYA/CKD for now remaining dialysis dependent.  Continue TIW HD.  --C Diff colitis ; improving clinically.    7/19 SY  --ARYA/CKD : Continue TIW HD.   Will continue as outpatient for now as no signs of recovery at this point.  --C Diff colitis  : clinically improving.  Continue po abtx.  --D/c Planning  D/w pt's daughter re : rehab with HD.  --Replete K.  Continue regular diet without restrictions for now.    7/20 MK  - ARYA/CKD remains HD dependent with oliguria.  Will attempt UF with HD.  K correction with HD and changed to regular diet with fluid restriction  - Cdiff: PO vanc with improving renal paramenters-  - Episodes of Desat: will have pulmonary re-eval prior to dc.  - DC planning rehab with dialysis  dw Dr Hightower  will see pt    7/21 MK  - ARYA/CKD remains hd dependent. post hd cxr with improved PVC.  Still with episodes of desaturation and dr hightower input noted.  CT with evidence of ascites for paracentesis  - cdiff: on po vanc.  still with loose stools,     7/22 MK  - ARYA/CKD remains HD dependent.  Will coordinate AM HD based upon timing of paracentesis, hypokalemia improved  - hypoxia with ascites: for paracentesis   - cdif on po vanc, rectal tube in place    7/23 MK  - ARYA/CKD remains HD dependent, toelrating hd.  + inc uop   - hypoxia with ascites: s/p paracentesis today, monitor response to oxygenation  - AOCD; fu trend of h/h, on epogen  - cdiff: po vanc    7/24 SY  --ARYA/CKD  Urine output may be increasing.  Follow creat trend to determine further dialysis support.  --Post Paracentesis : improve resp status.  --Anemia : continue LETICIA.  --C Diff : continue po Vanco.    7/25 SY  --ARYA/CKD  Monitor urine output.  Noted with increased creat.  Will maintain on TIW HD since fluid overloaded state is persistent.  --Monitor GI function.  --Post Paracentesis : resp status stabilized.  --Anemia :continue LETICIA.    7/26 SY  --ARYA/CKD  : Creat slowly  trending down.  Urine output not recorded.  Check creat in am to determine whether HD can be held off.  --Recurrent abdominal pain of concern.  Being assessed by primary team and GI.  --Bladder scan to assure full void.

## 2018-07-26 NOTE — PROGRESS NOTE ADULT - ASSESSMENT
Pseudomembranous colitis status post recent antibiotics    By mouth vancomycin   case discussed with  family  improving diarrhea and will monitor JYOTI strauss hospitalist  appetite may improve after DC flagyl, encourage PO intake        Patient had a recent course of C. difficile that was treated with vancomycin with a recurrence and he's had C. difficile in the past.  Due to severe C. difficile, would strongly consider a prolonged taper of vancomycin.  For now, would complete two-week course of vancomycin and then would taper her vancomycin over a long time.  Discussed with family.  Taper vancomycin, please dec vanco to 125 qid  Vancomycin 125 mg 4 times a day, one more week  Then 3 times a day for 2 weeks, then twice a day for one week, then daily for one week, then every other day for 1 week, then every 3rd day for 1 week.  Follow-up with me after that    inc reglan due to N and possible gastroparesis and post prandial abd pain  A fibrillation    Would hold anticoagulation secondary to history bleeding and colitis    COPD obesity, obstructive sleep apnea, coronary artery disease, overall comorbid risk factors     IVF per renal  HD as needed      acites, SP paracentesis

## 2018-07-26 NOTE — PROGRESS NOTE ADULT - PROBLEM SELECTOR PLAN 2
- Resolved  - continue to maintain fluid balance with HD MWF   - control HFpEF with medical management   - O2 as needed  - Pulm appreciated, add nebs  - Incentive Spirometry  - CXR: improvement of Pulmonary Vascular Congestion  - CT Abdomen: Moderate Ascites - contributing to desaturation;   - S/P Paracentesis 7/23/18 2800 cc fluid removed, improved respiratory status   - 3L NC O2 at night and on exertion as needed  - Hourizadeh appreciated, no longer needs BIPAP.

## 2018-07-26 NOTE — PROGRESS NOTE ADULT - ASSESSMENT
1) Clostridium Difficile Colitis clinically improved  2) Metabolic Acidosis  3) Restrictive Ventilatory Disease  4) SHAYY  5) Bilateral Pleural Effusions  6) Renal failure on dilaysis now  7) Leukocytosis is clinically improved  8)s/p shiley placed  9) increased ascites and small pleural effusions  10)Mucoid impaction seen in RLL cont to low recent O2 levels    82 y/o M w/pmhx of Afib, CHF, CAD s/p stents, COPD, PNA, upper GI bleed (duodenal)  BIB EMS from [facility] assisted living for fever, abd pain, and diarrhea that began 3 weeks ago. Was in the hospital for ESBL and was later dx with C-diff and started on a course of PO vancomycin for 10 days for the C-diff. At the present time has diffuse abdominal pain, being treated Vancomycin and IV Metronidazole  ABG reviewed and reveals 7.18/39/68, LA normal limits  Etiology likely from sepsis   repeat ABG noted  ABG - ( 09 Jul 2018 11:50 )  pH, Arterial: 7.21  pH, Blood: x     /  pCO2: 42    /  pO2: 67    / HCO3: 16    / Base Excess: -10.6 /  SaO2: 91      repeat cxr noted  At the present time, patient states he would like to be a full code  Continue monitoring hemodynamics (daughter states baseline diastolic tends to run between 25-40mmHg)   Monitor urine output aggressively   Used BIPAP/CPAP in the past (stopped as an outpatient after patient lost weight), may worsen intraabdominal pressures but if arrhythmias are present, may consider starting again.   Appreciate nephrology/cardiology/ID/GI/hospitalist recommendations  Will continue to monitor   hx of resp failure and intubation 2012 x 3-4 days  s/p Shiley and hemodialysis    overall prognosis remains guarded      hx SHAYY and treated with BIPAP in the past / stopped using it after wt loss  I was asked by Dr Tati Tomas to re eval for reported episodes of desaturations  pt's reported hypoxemic episodes after narcotics / while sleeping or having abd distention  he has known hx SHAYY / OHS and prior BIPAP 12/8 with O2 attached at home  will need supplemental O2 at night 3 liters nc as well as exertional need for O2 supplement as outpt  currently on o2 with o2 sat 96%  resp status improved after paracentesis  increased ascites and small pleural effusions improved after paracentesis  Mucoid impaction seen in RLL cont to low recent O2 levels  waiting for bed placement / rehab  I shall fu prn, reconsult if needed / dtr updated today at bedside 7/26    Thank you for the consult

## 2018-07-27 LAB
ALBUMIN SERPL ELPH-MCNC: 2.3 G/DL — LOW (ref 3.3–5)
ANION GAP SERPL CALC-SCNC: 10 MMOL/L — SIGNIFICANT CHANGE UP (ref 5–17)
BUN SERPL-MCNC: 39 MG/DL — HIGH (ref 7–23)
CALCIUM SERPL-MCNC: 7.5 MG/DL — LOW (ref 8.5–10.1)
CHLORIDE SERPL-SCNC: 110 MMOL/L — HIGH (ref 96–108)
CO2 SERPL-SCNC: 21 MMOL/L — LOW (ref 22–31)
CREAT SERPL-MCNC: 4.29 MG/DL — HIGH (ref 0.5–1.3)
GLUCOSE SERPL-MCNC: 89 MG/DL — SIGNIFICANT CHANGE UP (ref 70–99)
HCT VFR BLD CALC: 26.8 % — LOW (ref 39–50)
HGB BLD-MCNC: 8 G/DL — LOW (ref 13–17)
MAGNESIUM SERPL-MCNC: 1.8 MG/DL — SIGNIFICANT CHANGE UP (ref 1.6–2.6)
MCHC RBC-ENTMCNC: 29.2 PG — SIGNIFICANT CHANGE UP (ref 27–34)
MCHC RBC-ENTMCNC: 29.9 GM/DL — LOW (ref 32–36)
MCV RBC AUTO: 97.8 FL — SIGNIFICANT CHANGE UP (ref 80–100)
NRBC # BLD: 0 /100 WBCS — SIGNIFICANT CHANGE UP (ref 0–0)
PHOSPHATE SERPL-MCNC: 3.7 MG/DL — SIGNIFICANT CHANGE UP (ref 2.5–4.5)
PLATELET # BLD AUTO: 188 K/UL — SIGNIFICANT CHANGE UP (ref 150–400)
POTASSIUM SERPL-MCNC: 4.3 MMOL/L — SIGNIFICANT CHANGE UP (ref 3.5–5.3)
POTASSIUM SERPL-SCNC: 4.3 MMOL/L — SIGNIFICANT CHANGE UP (ref 3.5–5.3)
RBC # BLD: 2.74 M/UL — LOW (ref 4.2–5.8)
RBC # FLD: 19.8 % — HIGH (ref 10.3–14.5)
SODIUM SERPL-SCNC: 141 MMOL/L — SIGNIFICANT CHANGE UP (ref 135–145)
WBC # BLD: 4.28 K/UL — SIGNIFICANT CHANGE UP (ref 3.8–10.5)
WBC # FLD AUTO: 4.28 K/UL — SIGNIFICANT CHANGE UP (ref 3.8–10.5)

## 2018-07-27 RX ORDER — METOCLOPRAMIDE HCL 10 MG
5 TABLET ORAL THREE TIMES A DAY
Qty: 0 | Refills: 0 | Status: DISCONTINUED | OUTPATIENT
Start: 2018-07-27 | End: 2018-08-09

## 2018-07-27 RX ORDER — VANCOMYCIN HCL 1 G
500 VIAL (EA) INTRAVENOUS EVERY 6 HOURS
Qty: 0 | Refills: 0 | Status: DISCONTINUED | OUTPATIENT
Start: 2018-07-27 | End: 2018-08-07

## 2018-07-27 RX ORDER — METRONIDAZOLE 500 MG
250 TABLET ORAL EVERY 8 HOURS
Qty: 0 | Refills: 0 | Status: COMPLETED | OUTPATIENT
Start: 2018-07-27 | End: 2018-08-02

## 2018-07-27 RX ADMIN — HEPARIN SODIUM 5000 UNIT(S): 5000 INJECTION INTRAVENOUS; SUBCUTANEOUS at 05:28

## 2018-07-27 RX ADMIN — Medication 500 MILLIGRAM(S): at 17:11

## 2018-07-27 RX ADMIN — Medication 0.5 MILLIGRAM(S): at 20:04

## 2018-07-27 RX ADMIN — PANTOPRAZOLE SODIUM 40 MILLIGRAM(S): 20 TABLET, DELAYED RELEASE ORAL at 05:28

## 2018-07-27 RX ADMIN — Medication 8 MILLIGRAM(S): at 22:05

## 2018-07-27 RX ADMIN — SIMVASTATIN 10 MILLIGRAM(S): 20 TABLET, FILM COATED ORAL at 22:05

## 2018-07-27 RX ADMIN — PANTOPRAZOLE SODIUM 40 MILLIGRAM(S): 20 TABLET, DELAYED RELEASE ORAL at 17:11

## 2018-07-27 RX ADMIN — HEPARIN SODIUM 5000 UNIT(S): 5000 INJECTION INTRAVENOUS; SUBCUTANEOUS at 17:11

## 2018-07-27 RX ADMIN — FINASTERIDE 5 MILLIGRAM(S): 5 TABLET, FILM COATED ORAL at 12:36

## 2018-07-27 RX ADMIN — Medication 10 MILLIGRAM(S): at 05:28

## 2018-07-27 RX ADMIN — Medication 100 MILLIGRAM(S): at 12:37

## 2018-07-27 RX ADMIN — Medication 50 MILLIGRAM(S): at 22:05

## 2018-07-27 RX ADMIN — Medication 81 MILLIGRAM(S): at 12:36

## 2018-07-27 RX ADMIN — CHOLESTYRAMINE 4 GRAM(S): 4 POWDER, FOR SUSPENSION ORAL at 22:05

## 2018-07-27 RX ADMIN — Medication 50 MILLIGRAM(S): at 13:07

## 2018-07-27 RX ADMIN — CHOLESTYRAMINE 4 GRAM(S): 4 POWDER, FOR SUSPENSION ORAL at 09:06

## 2018-07-27 RX ADMIN — Medication 125 MILLIGRAM(S): at 05:29

## 2018-07-27 RX ADMIN — HYDROMORPHONE HYDROCHLORIDE 0.5 MILLIGRAM(S): 2 INJECTION INTRAMUSCULAR; INTRAVENOUS; SUBCUTANEOUS at 05:28

## 2018-07-27 RX ADMIN — Medication 5 MILLIGRAM(S): at 13:07

## 2018-07-27 RX ADMIN — Medication 500 MILLIGRAM(S): at 13:07

## 2018-07-27 NOTE — PROGRESS NOTE ADULT - SUBJECTIVE AND OBJECTIVE BOX
Patient is a 83y old  Male who presents with a chief complaint of Fever/Diarrhea (20 Jul 2018 13:30)      HPI:  Pt is a 82 y/o M w/pmhx of Afib, CHF, CAD s/p stents, COPD, PNA, upper GI bleed (duodenal)  BIB EMS from [facility] living for fever, abd pain, and diarrhea that began 3 weeks ago. Was in the hospital for PNA tx and was later dx with C-diff and started on a course of PO vancomycin for 10 days for the C-diff. States that he has had diarrhea since before the course of abx. Pain has been increased for the past few weeks and is characterized as a cramping feeling. Daughters also note that there is increased distension. Also notes chest pain characterized as a cramping in the central chest. 83% O2 sat noted this morning at the assisted living facility. Takes O2 routinely at night but not during the day. (06 Jul 2018 23:55)    History per patient and daughter. Patient admits to continued lower abdominal pain most prominent in the right lower quadrant. Negative nausea or vomiting. Noted increased loose stools. Negative blood in stool.  The pain is crampy, crescendo decrescendo, unchanged with eating.  Stools increased after we increased Reglan dosage but his vancomycin dosage was also recently decreased.        PAST MEDICAL & SURGICAL HISTORY:  Diastolic CHF  Peripheral vascular disease  Afib  Anemia  CKD (chronic kidney disease)  COPD (chronic obstructive pulmonary disease)  SHAYY (obstructive sleep apnea)  Sepsis, due to unspecified organism: 2/2 poorly healing wounds b/l  Dyspepsia: On moderate exertion.  Sleep apnea, obstructive: Requires home 02 therapy, and treatment with BIPAP  Atelectasis  Pleural effusion, bilateral  Respiratory failure  Peripheral edema  CRI (chronic renal insufficiency)  Gout  Benign prostatic hypertrophy  Spinal stenosis  Hypercholesterolemia  GERD (gastroesophageal reflux disease)  CAD (coronary artery disease)  Hypertension  S/P angioplasty with stent  Cataract of left eye  Prostate: Surgery green light procedure.  S/P rotator cuff surgery: Right  S/P angioplasty      MEDICATIONS  (STANDING):  ALBUTerol/ipratropium for Nebulization. 3 milliLiter(s) Nebulizer once  allopurinol 100 milliGRAM(s) Oral daily  aspirin  chewable 81 milliGRAM(s) Oral daily  buDESOnide   0.5 milliGRAM(s) Respule 0.5 milliGRAM(s) Inhalation two times a day  cholestyramine Powder (Sugar-Free) 4 Gram(s) Oral two times a day  diltiazem    Tablet 30 milliGRAM(s) Oral every 6 hours  doxazosin 8 milliGRAM(s) Oral at bedtime  epoetin nelly Injectable 3000 Unit(s) IV Push <User Schedule>  epoetin nelly Injectable 4000 Unit(s) IV Push <User Schedule>  finasteride 5 milliGRAM(s) Oral daily  heparin  Injectable 5000 Unit(s) SubCutaneous every 12 hours  metoclopramide 10 milliGRAM(s) Oral three times a day  pantoprazole    Tablet 40 milliGRAM(s) Oral every 12 hours  simvastatin 10 milliGRAM(s) Oral at bedtime  sodium ferric gluconate complex Injectable 125 milliGRAM(s) IV Push <User Schedule>  vancomycin    Solution 500 milliGRAM(s) Oral every 6 hours    MEDICATIONS  (PRN):  acetaminophen   Tablet 650 milliGRAM(s) Oral every 8 hours PRN For Temp greater than 38 C (100.4 F)  acetaminophen   Tablet. 650 milliGRAM(s) Oral every 8 hours PRN Mild Pain (1 - 3)  albumin human 25% IVPB 50 milliLiter(s) IV Intermittent <User Schedule> PRN sbp<=120mmhg  ALBUTerol   0.5% 2.5 milliGRAM(s) Nebulizer every 4 hours PRN Shortness of Breath and/or Wheezing  diphenhydrAMINE   Capsule 25 milliGRAM(s) Oral at bedtime PRN sleep  HYDROmorphone  Injectable 0.5 milliGRAM(s) IV Push every 6 hours PRN Severe Pain (7 - 10)  ondansetron Injectable 4 milliGRAM(s) IV Push every 6 hours PRN Nausea and/or Vomiting      Allergies    Zosyn (Rash)    Intolerances        SOCIAL HISTORY:NC    FAMILY HISTORY:  No pertinent family history in first degree relatives      REVIEW OF SYSTEMS:    CONSTITUTIONAL: No weakness, fevers or chills  EYES/ENT: No visual changes;  No vertigo or throat pain   NECK: No pain or stiffness  RESPIRATORY: No cough, wheezing, hemoptysis; No shortness of breath  CARDIOVASCULAR: No chest pain or palpitations  GENITOURINARY: No dysuria, frequency or hematuria  NEUROLOGICAL: No numbness or weakness  SKIN: No itching, burning, rashes, or lesions   All other review of systems is negative unless indicated above.    Vital Signs Last 24 Hrs  T(C): 36.7 (27 Jul 2018 05:46), Max: 37.2 (26 Jul 2018 16:57)  T(F): 98 (27 Jul 2018 05:46), Max: 99 (26 Jul 2018 21:09)  HR: 87 (27 Jul 2018 05:46) (79 - 87)  BP: 112/28 (27 Jul 2018 05:46) (112/28 - 142/39)  BP(mean): --  RR: 18 (27 Jul 2018 05:46) (18 - 20)  SpO2: 93% (27 Jul 2018 05:46) (92% - 97%)    PHYSICAL EXAM:    Constitutional: NAD, well-developed  HEENT: EOMI, throat clear  Neck: No LAD, supple  Respiratory: CTA and P  Cardiovascular: S1 and S2, RRR, no M  Gastrointestinal: BS+, soft, mild RLQ tend/ND, neg HSM,  Extremities: pos peripheral edema, neg clubing, cyanosis    Neurological: A/O x 3, no focal deficits  Psychiatric: Normal mood, normal affect  Skin: No rashes    LABS:  CBC Full  -  ( 27 Jul 2018 07:00 )  WBC Count : 4.28 K/uL  Hemoglobin : 8.0 g/dL  Hematocrit : 26.8 %  Platelet Count - Automated : 188 K/uL  Mean Cell Volume : 97.8 fl  Mean Cell Hemoglobin : 29.2 pg  Mean Cell Hemoglobin Concentration : 29.9 gm/dL  Auto Neutrophil # : x  Auto Lymphocyte # : x  Auto Monocyte # : x  Auto Eosinophil # : x  Auto Basophil # : x  Auto Neutrophil % : x  Auto Lymphocyte % : x  Auto Monocyte % : x  Auto Eosinophil % : x  Auto Basophil % : x    07-27    141  |  110<H>  |  39<H>  ----------------------------<  89  4.3   |  21<L>  |  4.29<H>    Ca    7.5<L>      27 Jul 2018 07:00  Phos  3.7     07-27  Mg     1.8     07-27    TPro  x   /  Alb  2.3<L>  /  TBili  x   /  DBili  x   /  AST  x   /  ALT  x   /  AlkPhos  x   07-27            RADIOLOGY & ADDITIONAL STUDIES:  < from: CT Abdomen and Pelvis No Cont (07.21.18 @ 11:15) >  EXAM:  CT ABDOMEN AND PELVIS                            PROCEDURE DATE:  07/21/2018          INTERPRETATION:  CT ABDOMEN AND PELVIS    HISTORY:  abdominal distension, pseudomembranous colitis    Technique: CT of the abdomen and pelvis is performed without oral or   intravenous contrast. Axial images are supplemented with coronal and   sagittal reformations. This study was performed using automatic exposure   control (radiation dose reduction software) to obtain a diagnostic image   quality scanwith patient dose as low as reasonably achievable.    Contrast:     None    Comparison: CT abdomen and pelvis 7/9/2018    Findings:  LIVER: Unchanged small right lobe hypoattenuation.  SPLEEN: Normal.  PANCREAS: Normal.  GALLBLADDER/BILIARY TREE: Nondilated. Gallstones.  ADRENALS: Normal.  KIDNEYS: No hydronephrosis or urinary tract calculi.  LYMPHADENOPATHY/RETROPERITONEUM: No adenopathy.  VASCULATURE: Aortoiliac atherosclerosis is severe without aneurysm. IVC   filter.  BOWEL: Gastric clips again noted at the fundus. No bowel obstruction.   Stable appearance of pancolitis with moderate colonic wall thickening and   pericolonic fluid. No pneumatosis or free air.  PELVIC VISCERA: Diffuse urinary bladder wall thickening and perivesical   stranding. Sedimentation level in the bladder lumen.  PELVIC LYMPH NODES: No pelvic adenopathy.  PERITONEUM/ABDOMINAL WALL: No free air. Increasing moderate ascites.   Diffuse body wall edema.  SKELETAL: No acute bony abnormality.  LUNG BASES: Small bilateral pleural effusions. Loculated fluid versus   mucoid impaction at the right lung base.    IMPRESSION:     Stable appearance of pancolitis consistent with pseudomembranous colitis.   No pneumatosis or free air.    Increasing moderate ascites.    Anasarca.    Urinary bladder cystitis. Correlate with UA.              < end of copied text >

## 2018-07-27 NOTE — PROGRESS NOTE ADULT - ASSESSMENT
Pt is a 84 y/o M w/pmhx of Afib, CHF, CAD s/p stents, COPD, PNA, upper GI bleed (duodenal), PVD, s/p right lower extremity amputation,  recent hospitalization for pneumonia and subsequent Cdiff colitis admitted on 7/6 from assisted living for evaluation of persistent abdominal pain, fever and diarrhea that has been ongoing despite the po vancomycin course that the patient had been on. The patient also notes mid epigastric/chest pain. History per daughter at bedside and medical record as patient is poor historian.  1. Patient admitted with severe Cdiff pan colitis, also noted with leukocytosis most likely reactive to Cdiff infection  - follow up cultures   - iv hydration and supportive care   - serial cbc and monitor temperature   - back on vancomycin 500 mg po q 6 hours  - restart flagyl 250 mg iv q 8 hours, lower dose given dialysis  - continue contact isolation  - limit antibiotics exposure  2. other issues: Afib, CHF, CAD s/p stents, COPD, PNA, upper GI bleed (duodenal), PVD, s/p right lower extremity amputation  - per medicine  3. Bladder wall thickening most likely due to the fact that patient now dialysis patient  - would avoid starting antibiotics in this patient with severe CDiff colitis  - monitor off antibiotics

## 2018-07-27 NOTE — PROGRESS NOTE ADULT - ASSESSMENT
Pseudomembranous colitis status post recent antibiotics    By mouth vancomycin   case discussed with  family  Patient with continued abdominal pain and possible inc diarrhea, discussed with ID, would increase tobacco to 500 4 times a day and add IV Flagyl.  I D will determine dosage.    not clear if c diff not cleared    Discussed with family, we will decrease Reglan dosage.                    A fibrillation    Would hold anticoagulation secondary to history bleeding and colitis    COPD obesity, obstructive sleep apnea, coronary artery disease, overall comorbid risk factors     IVF per renal  HD as needed      acites, SP paracentesis

## 2018-07-27 NOTE — PROGRESS NOTE ADULT - PROBLEM SELECTOR PLAN 1
- vanc increased from 125 mg -> 500 mg q6H -Then 3 times a day for 2 weeks, then twice a day for one week, then daily for one week, then every other day for 1 week, then every 3rd day for 1 week. Recommend follow-up with Dr. Grullon after d/c  - s/p Flagyl 10 days- restarted flagyl  mg q8h on 7/27  - ID appreciated, continue PO vancomycin  - GI appreciated, taper regimen structured  - Colorectal Consult appreciated, would not consider intervention at this time  -Paracentesis 7/23/18- 2800 cc removed, cytopathology report-no malig, inflammatory cells, PMNs seen on analysis of fluid  -SAAG= .6 (<1.1), therefore not pointing towards HF, or portal hypertension--DDx:  serositis

## 2018-07-27 NOTE — PROGRESS NOTE ADULT - SUBJECTIVE AND OBJECTIVE BOX
84 y/o M w/pmhx of Afib (CHADVASC 3, not on AC, GI bleed), CHF, CAD s/p stents, COPD, recent PNA on abx, upper GI bleed (duodenal)  BIB EMS from [facility] for fever, abd pain, and diarrhea that began 3 weeks ago. Was in the hospital for PNA tx and was later dx with C-diff and started on a course of PO vancomycin for 10 days for the C-diff. States that he has had diarrhea since before the course of abx. Pain has been increased for the past few weeks and is characterized as a cramping feeling. Daughters also note that there is increased distension. Also notes chest pain characterized as a cramping in the central chest. 83% O2 sat noted morning of admission on 7/6/18, at the assisted living facility. Takes O2 routinely at night but not during the day.    7/25: seen and eval. patient very deconditioned and frail. Currently getting HD. Paracentesis reviewed. No overnight fevers, chills or shakes. Labs and vitals reviewed. Patient denies any HA, CP, SOB.     7/26: Patient seen and eval. hemodynamically stable. very frail. complaining of (R) sided abdominal pain 6/10, comfortable with positioning. No fevers, chills. Care discussed with Dr. Palacio. Care discussed with the family at the bedside. Patient denies any HA, CP, SOB.     7/27: deconditioned frail, less abdominal pain today.     ROS:  as per HPI    PHYSICAL EXAM:    T(C): 36.6 (27 Jul 2018 12:18), Max: 37.2 (26 Jul 2018 16:57)  T(F): 97.9 (27 Jul 2018 12:18), Max: 99 (26 Jul 2018 21:09)  HR: 79 (27 Jul 2018 12:53) (73 - 87)  BP: 119/30 (27 Jul 2018 12:53) (112/28 - 142/39)  RR: 18 (27 Jul 2018 12:18) (18 - 20)  SpO2: 96% (27 Jul 2018 12:18) (92% - 96%)    Constitutional: very deconditioned, frail, sick appearing  HEENT: PERR, EOMI, Normal Hearing, MMM  Neck: no JVD  Respiratory: Breath sounds are clear bilaterally, No wheezing, rales or rhonchi  Cardiovascular: S1 and S2, regular rate and rhythm, no Murmurs, gallops or rubs  Gastrointestinal:  Distended+, no rigidity, no rebound  Extremities: LLE lesion with dressings c/d/i, RLE s/p AKA +  Vascular: 2+ peripheral pulses  Neurological: A/O x 3, no focal deficits  Musculoskeletal: unable to assess  Skin: Incontinence associated Dermatitis with Rectal tube+ output green/loose stool    LABS:       CBC Full  -  ( 27 Jul 2018 07:00 )  WBC Count : 4.28 K/uL  Hemoglobin : 8.0 g/dL  Hematocrit : 26.8 %  Platelet Count - Automated : 188 K/uL  Mean Cell Volume : 97.8 fl  Mean Cell Hemoglobin : 29.2 pg  Mean Cell Hemoglobin Concentration : 29.9 gm/dL  Auto Neutrophil # : x  Auto Lymphocyte # : x  Auto Monocyte # : x  Auto Eosinophil # : x  Auto Basophil # : x  Auto Neutrophil % : x  Auto Lymphocyte % : x  Auto Monocyte % : x  Auto Eosinophil % : x  Auto Basophil % : x        07-27    141  |  110<H>  |  39<H>  ----------------------------<  89  4.3   |  21<L>  |  4.29<H>    Ca    7.5<L>      27 Jul 2018 07:00  Phos  3.7     07-27  Mg     1.8     07-27    TPro  x   /  Alb  2.3<L>  /  TBili  x   /  DBili  x   /  AST  x   /  ALT  x   /  AlkPhos  x   07-27    # Pseudomembranous colitis  - Due to severe C. difficile, patient is anticipated to be on a prolonged antibiotic taper  - back on vancomycin 500 mg po q 6 hours  - restart flagyl 250 mg iv q 8 hours, lower dose given dialysis  - Then 3 times a day for 2 weeks, then twice a day for one week, then daily for one week, then every other day for 1 week, then every 3rd day for 1 week.  - tolerating antibiotics without rashes or side effects   - continue contact isolation      # Acute respiratory failure with hypoxia   - continue to maintain fluid balance with HD MWF   - control HFpEF with medical management   - O2 as needed  - Pulm appreciated, add nebs  - Incentive Spirometry  - CXR: improvement of Pulmonary Vascular Congestion  - CT Abdomen: Moderate Ascites - contributing to desaturation;   - 3L NC O2 at night and on exertion as needed.     # Renal insufficiency  -  s/p Tunnel Cath on 7/16/18, patient currently HD dependent  - patient currently on HD.     # Afib   - Currently sinus  - No AC due to hx of Severe GI bleed  - BP does not tolerate Cardizem or Beta Blocker.     # Mobitz type 2 second degree AV block  - non recurrent, now in sinus rhythm RRR  - EP consult appreciated: likely vagal, may be 2/2 SHAYY.

## 2018-07-27 NOTE — PROGRESS NOTE ADULT - SUBJECTIVE AND OBJECTIVE BOX
NEPHROLOGY INTERVAL HPI/OVERNIGHT EVENTS:  no c/o  11 episodes of diarrhea ( ?related to reglan)  2+ incont UOP in diaper  toelrating po       MEDICATIONS  (STANDING):  ALBUTerol/ipratropium for Nebulization. 3 milliLiter(s) Nebulizer once  allopurinol 100 milliGRAM(s) Oral daily  aspirin  chewable 81 milliGRAM(s) Oral daily  buDESOnide   0.5 milliGRAM(s) Respule 0.5 milliGRAM(s) Inhalation two times a day  cholestyramine Powder (Sugar-Free) 4 Gram(s) Oral two times a day  diltiazem    Tablet 30 milliGRAM(s) Oral every 6 hours  doxazosin 8 milliGRAM(s) Oral at bedtime  epoetin nelly Injectable 3000 Unit(s) IV Push <User Schedule>  epoetin nelly Injectable 4000 Unit(s) IV Push <User Schedule>  finasteride 5 milliGRAM(s) Oral daily  heparin  Injectable 5000 Unit(s) SubCutaneous every 12 hours  metoclopramide 5 milliGRAM(s) Oral three times a day  metroNIDAZOLE  IVPB 250 milliGRAM(s) IV Intermittent every 8 hours  pantoprazole    Tablet 40 milliGRAM(s) Oral every 12 hours  simvastatin 10 milliGRAM(s) Oral at bedtime  sodium ferric gluconate complex Injectable 125 milliGRAM(s) IV Push <User Schedule>  vancomycin    Solution 500 milliGRAM(s) Oral every 6 hours    MEDICATIONS  (PRN):  acetaminophen   Tablet 650 milliGRAM(s) Oral every 8 hours PRN For Temp greater than 38 C (100.4 F)  acetaminophen   Tablet. 650 milliGRAM(s) Oral every 8 hours PRN Mild Pain (1 - 3)  albumin human 25% IVPB 50 milliLiter(s) IV Intermittent <User Schedule> PRN sbp<=120mmhg  ALBUTerol   0.5% 2.5 milliGRAM(s) Nebulizer every 4 hours PRN Shortness of Breath and/or Wheezing  diphenhydrAMINE   Capsule 25 milliGRAM(s) Oral at bedtime PRN sleep  HYDROmorphone  Injectable 0.5 milliGRAM(s) IV Push every 6 hours PRN Severe Pain (7 - 10)  ondansetron Injectable 4 milliGRAM(s) IV Push every 6 hours PRN Nausea and/or Vomiting      Allergies    Zosyn (Rash)    Intolerances        I&O's Detail    2018 07:01  -  2018 13:23  --------------------------------------------------------  IN:  Total IN: 0 mL    OUT:    Voided: 75 mL  Total OUT: 75 mL    Total NET: -75 mL       Vital Signs Last 24 Hrs  T(C): 36.6 (2018 12:18), Max: 37.2 (2018 16:57)  T(F): 97.9 (2018 12:18), Max: 99 (2018 21:09)  HR: 79 (2018 12:53) (73 - 87)  BP: 119/30 (2018 12:53) (112/28 - 142/39)  BP(mean): --  RR: 18 (2018 12:18) (18 - 20)  SpO2: 96% (2018 12:18) (92% - 96%)  Daily     Daily Weight in k.1 (2018 09:50)    PHYSICAL EXAM:  General: alert. awake Ox3  HEENT: MMM  CV: s1s2 rrr  LUNGS: B/L CTA  EXT: no edema    LABS:                        8.0    4.28  )-----------( 188      ( 2018 07:00 )             26.8     07-27    141  |  110<H>  |  39<H>  ----------------------------<  89  4.3   |  21<L>  |  4.29<H>    Ca    7.5<L>      2018 07:00  Phos  3.7       Mg     1.8         TPro  x   /  Alb  2.3<L>  /  TBili  x   /  DBili  x   /  AST  x   /  ALT  x   /  AlkPhos  x           Phosphorus Level, Serum: 3.7 mg/dL ( @ 07:00)  Magnesium, Serum: 1.8 mg/dL ( @ 07:00) NEPHROLOGY INTERVAL HPI/OVERNIGHT EVENTS:  no c/o  11 episodes of diarrhea ( ?related to reglan)  2+ incont UOP in diaper  toelrating po   IV flagyll added and on higher dose of po vanc  ID/GI input noted_ reglan dec      MEDICATIONS  (STANDING):  ALBUTerol/ipratropium for Nebulization. 3 milliLiter(s) Nebulizer once  allopurinol 100 milliGRAM(s) Oral daily  aspirin  chewable 81 milliGRAM(s) Oral daily  buDESOnide   0.5 milliGRAM(s) Respule 0.5 milliGRAM(s) Inhalation two times a day  cholestyramine Powder (Sugar-Free) 4 Gram(s) Oral two times a day  diltiazem    Tablet 30 milliGRAM(s) Oral every 6 hours  doxazosin 8 milliGRAM(s) Oral at bedtime  epoetin nelly Injectable 3000 Unit(s) IV Push <User Schedule>  epoetin nelly Injectable 4000 Unit(s) IV Push <User Schedule>  finasteride 5 milliGRAM(s) Oral daily  heparin  Injectable 5000 Unit(s) SubCutaneous every 12 hours  metoclopramide 5 milliGRAM(s) Oral three times a day  metroNIDAZOLE  IVPB 250 milliGRAM(s) IV Intermittent every 8 hours  pantoprazole    Tablet 40 milliGRAM(s) Oral every 12 hours  simvastatin 10 milliGRAM(s) Oral at bedtime  sodium ferric gluconate complex Injectable 125 milliGRAM(s) IV Push <User Schedule>  vancomycin    Solution 500 milliGRAM(s) Oral every 6 hours    MEDICATIONS  (PRN):  acetaminophen   Tablet 650 milliGRAM(s) Oral every 8 hours PRN For Temp greater than 38 C (100.4 F)  acetaminophen   Tablet. 650 milliGRAM(s) Oral every 8 hours PRN Mild Pain (1 - 3)  albumin human 25% IVPB 50 milliLiter(s) IV Intermittent <User Schedule> PRN sbp<=120mmhg  ALBUTerol   0.5% 2.5 milliGRAM(s) Nebulizer every 4 hours PRN Shortness of Breath and/or Wheezing  diphenhydrAMINE   Capsule 25 milliGRAM(s) Oral at bedtime PRN sleep  HYDROmorphone  Injectable 0.5 milliGRAM(s) IV Push every 6 hours PRN Severe Pain (7 - 10)  ondansetron Injectable 4 milliGRAM(s) IV Push every 6 hours PRN Nausea and/or Vomiting      Allergies    Zosyn (Rash)    Intolerances        I&O's Detail    2018 07:01  -  2018 13:23  --------------------------------------------------------  IN:  Total IN: 0 mL    OUT:    Voided: 75 mL  Total OUT: 75 mL    Total NET: -75 mL       Vital Signs Last 24 Hrs  T(C): 36.6 (2018 12:18), Max: 37.2 (2018 16:57)  T(F): 97.9 (2018 12:18), Max: 99 (2018 21:09)  HR: 79 (2018 12:53) (73 - 87)  BP: 119/30 (2018 12:53) (112/28 - 142/39)  BP(mean): --  RR: 18 (2018 12:18) (18 - 20)  SpO2: 96% (2018 12:18) (92% - 96%)  Daily     Daily Weight in k.1 (2018 09:50)    PHYSICAL EXAM:  General: alert. awake Ox3  HEENT: MMM  CV: s1s2 rrr  LUNGS: B/L CTA  EXT: no edema    LABS:                        8.0    4.28  )-----------( 188      ( 2018 07:00 )             26.8         141  |  110<H>  |  39<H>  ----------------------------<  89  4.3   |  21<L>  |  4.29<H>    Ca    7.5<L>      2018 07:00  Phos  3.7       Mg     1.8         TPro  x   /  Alb  2.3<L>  /  TBili  x   /  DBili  x   /  AST  x   /  ALT  x   /  AlkPhos  x           Phosphorus Level, Serum: 3.7 mg/dL ( @ 07:00)  Magnesium, Serum: 1.8 mg/dL ( @ 07:00)

## 2018-07-27 NOTE — PROGRESS NOTE ADULT - ASSESSMENT
82 y/o wm with hx of ckd stage 3 ( scr 1.5-1.7) with ARYA due to volume depletion from CDiff associated colitis and diarrhea in presence of diuretic use PTA.  Now with non anion gap metabolic acidosis and worsening renal parameters.  CXR with mild CHF with hx of diastolic CHF.    PLAN  - obtain abd and lactate  - will change ivf and add bicarbonate and keep at current rate  - hold lasix done.   - fu uop and scr closely and will monitor respiratory status  - bp and hr stable now, cardizem adjusted and lopressor added    7/9 MK  - ARYA: worsening renal parameters with metabolic acidosis, non ag.  Previous lactate WNL and since placed on bicarbonate drip  fu repeat abg today.  Increase IVF rate  possibility of HD discussed with enio, hcp daughter, pt has been agreeable in past.   DC hydralazine  dc morphine and change to dilaudid  - Cdiff with rising scr and worsening abd distension: CRS consult ongoing  possible repeat ct scan  DW Dr ballesteros    7/10  Anuric  anasarca  Non anion gap acidosis on bicarb drip  ARYA, anuric  CKD 3 history  d/w Family, and pt agreeable  called ICU for line placement and to dialyze the pt in ICU for monitoring    7/10  HD initiated via R temp HD catheter  tolerating well  no complaints  good abf    7/11 SY  --ARYA with Anasarca.  Urine output slightly improved.  However, remains quite fluid overloaded.  Will plan to continue HD until fluid status is much improved.  HD order written.  3 hour tx today.  Will aim to remove 2.5 kg as tolerated.  --C DIff colitis ; continue to monitor closely.    7/12 SY  --ARYA with Anasarca.   Remains Oligo-anuric.    --HD with goal of 2.5 kg UF again today.  --C Diff colitis.   Clinically improved   Continue to monitor.    7/13  The patient was dialyzed 3 days in a row this week  Discussed with the patient's daughter and hospitalist, intensivist  Will skip dialysis today, no urgent need for dialysis  We'll dialyze the patient tomorrow on Saturday and then skip Sunday to dialyze the patient again on Monday.    7/14  We'll dialyze against 4 K bath  Case discussed with the patient's daughter  May need a permanent catheter by Monday      7/15  Dialyze with a 4K bath  Potassium is 3.3 most likely from the diarrhea  Schedule for permacath placement after dialysis on Monday or Tuesday  Patient is comfortable no complications noted at this time    7/16 MK  - ARYA/ CKD stage 3 remains oliguirc and HD dependent.  LImited UF at HD toleated with the   hypokalemia corrected with HD.  Permcath planned today  - Cdiff: on PO vanc.  improved leukocytosis and abd distension     7/17  s/p perm cath  HD tomorrow  4 K bath  replace K   overall stable    7/18 SY  --ARYA/CKD for now remaining dialysis dependent.  Continue TIW HD.  --C Diff colitis ; improving clinically.    7/19 SY  --ARYA/CKD : Continue TIW HD.   Will continue as outpatient for now as no signs of recovery at this point.  --C Diff colitis  : clinically improving.  Continue po abtx.  --D/c Planning  D/w pt's daughter re : rehab with HD.  --Replete K.  Continue regular diet without restrictions for now.    7/20 MK  - ARYA/CKD remains HD dependent with oliguria.  Will attempt UF with HD.  K correction with HD and changed to regular diet with fluid restriction  - Cdiff: PO vanc with improving renal paramenters-  - Episodes of Desat: will have pulmonary re-eval prior to dc.  - DC planning rehab with dialysis  dw Dr Hightower  will see pt    7/21 MK  - ARYA/CKD remains hd dependent. post hd cxr with improved PVC.  Still with episodes of desaturation and dr hightower input noted.  CT with evidence of ascites for paracentesis  - cdiff: on po vanc.  still with loose stools,     7/22 MK  - ARYA/CKD remains HD dependent.  Will coordinate AM HD based upon timing of paracentesis, hypokalemia improved  - hypoxia with ascites: for paracentesis   - cdif on po vanc, rectal tube in place    7/23 MK  - ARYA/CKD remains HD dependent, toelrating hd.  + inc uop   - hypoxia with ascites: s/p paracentesis today, monitor response to oxygenation  - AOCD; fu trend of h/h, on epogen  - cdiff: po vanc    7/24 SY  --ARYA/CKD  Urine output may be increasing.  Follow creat trend to determine further dialysis support.  --Post Paracentesis : improve resp status.  --Anemia : continue LETICIA.  --C Diff : continue po Vanco.    7/25 SY  --ARYA/CKD  Monitor urine output.  Noted with increased creat.  Will maintain on TIW HD since fluid overloaded state is persistent.  --Monitor GI function.  --Post Paracentesis : resp status stabilized.  --Anemia :continue LETICIA.    7/26 SY  --ARYA/CKD  : Creat slowly  trending down.  Urine output not recorded.  Check creat in am to determine whether HD can be held off.  --Recurrent abdominal pain of concern.  Being assessed by primary team and GI.  --Bladder scan to assure full void.    7/27 MK  - ARYA/CKD: with + inc Uop and significant volume depletion overnight.  Bladder scan of 77 ml with stable respiratory status.  NO Hd today and reassess in the AM.

## 2018-07-27 NOTE — PROGRESS NOTE ADULT - SUBJECTIVE AND OBJECTIVE BOX
Patient is a 83y old  Male who presents with a chief complaint of complain of diarrhea, fever (06 Jul 2018 23:55)      Date of service: 07-27-18 @ 10:28    Patient has had increase in diarrhea  Abdominal pain, abdominal distention          ROS: no fever or chills; denies dizziness, no HA, no SOB or cough, no abdominal pain, no diarrhea or constipation; no dysuria, no urinary frequency, no legs pain, no rashes    MEDICATIONS  (STANDING):  ALBUTerol/ipratropium for Nebulization. 3 milliLiter(s) Nebulizer once  allopurinol 100 milliGRAM(s) Oral daily  aspirin  chewable 81 milliGRAM(s) Oral daily  buDESOnide   0.5 milliGRAM(s) Respule 0.5 milliGRAM(s) Inhalation two times a day  cholestyramine Powder (Sugar-Free) 4 Gram(s) Oral two times a day  diltiazem    Tablet 30 milliGRAM(s) Oral every 6 hours  doxazosin 8 milliGRAM(s) Oral at bedtime  epoetin nelly Injectable 3000 Unit(s) IV Push <User Schedule>  epoetin nelly Injectable 4000 Unit(s) IV Push <User Schedule>  finasteride 5 milliGRAM(s) Oral daily  heparin  Injectable 5000 Unit(s) SubCutaneous every 12 hours  metoclopramide 5 milliGRAM(s) Oral three times a day  metroNIDAZOLE  IVPB 250 milliGRAM(s) IV Intermittent every 8 hours  pantoprazole    Tablet 40 milliGRAM(s) Oral every 12 hours  simvastatin 10 milliGRAM(s) Oral at bedtime  sodium ferric gluconate complex Injectable 125 milliGRAM(s) IV Push <User Schedule>  vancomycin    Solution 500 milliGRAM(s) Oral every 6 hours    MEDICATIONS  (PRN):  acetaminophen   Tablet 650 milliGRAM(s) Oral every 8 hours PRN For Temp greater than 38 C (100.4 F)  acetaminophen   Tablet. 650 milliGRAM(s) Oral every 8 hours PRN Mild Pain (1 - 3)  albumin human 25% IVPB 50 milliLiter(s) IV Intermittent <User Schedule> PRN sbp<=120mmhg  ALBUTerol   0.5% 2.5 milliGRAM(s) Nebulizer every 4 hours PRN Shortness of Breath and/or Wheezing  diphenhydrAMINE   Capsule 25 milliGRAM(s) Oral at bedtime PRN sleep  HYDROmorphone  Injectable 0.5 milliGRAM(s) IV Push every 6 hours PRN Severe Pain (7 - 10)  ondansetron Injectable 4 milliGRAM(s) IV Push every 6 hours PRN Nausea and/or Vomiting      Vital Signs Last 24 Hrs  T(C): 36.7 (27 Jul 2018 05:46), Max: 37.2 (26 Jul 2018 16:57)  T(F): 98 (27 Jul 2018 05:46), Max: 99 (26 Jul 2018 21:09)  HR: 87 (27 Jul 2018 05:46) (79 - 87)  BP: 112/28 (27 Jul 2018 05:46) (112/28 - 142/39)  BP(mean): --  RR: 18 (27 Jul 2018 05:46) (18 - 20)  SpO2: 93% (27 Jul 2018 05:46) (92% - 97%)    Physical Exam:        PE:    Constitutional: frail looking  HEENT: NC/AT, EOMI, PERRLA, conjunctivae clear; ears and nose atraumatic; pharynx clear  Neck: supple; thyroid not palpable  Respiratory: respiratory effort normal; clear to auscultation  Cardiovascular: S1S2 regular, no murmurs  Abdomen: soft, right lower quadrant tender, difusely distended, positive BS; no liver or spleen organomegaly  Genitourinary: no suprapubic tenderness  Musculoskeletal: s/p right lower extremity amputation; left lower extremity with dressing in place  Neurological/ Psychiatric: AxOx3, judgement and insight normal;  moving all extremities  Skin: no rashes; no palpable lesions    Labs: all available labs reviewed                        Labs:                        8.0    4.28  )-----------( 188      ( 27 Jul 2018 07:00 )             26.8     07-27    141  |  110<H>  |  39<H>  ----------------------------<  89  4.3   |  21<L>  |  4.29<H>    Ca    7.5<L>      27 Jul 2018 07:00  Phos  3.7     07-27  Mg     1.8     07-27    TPro  x   /  Alb  2.3<L>  /  TBili  x   /  DBili  x   /  AST  x   /  ALT  x   /  AlkPhos  x   07-27           Cultures:       Culture - Fungal, Body Fluid (collected 07-23-18 @ 09:00)  Source: Peritoneal Peritoneal Fluid  Preliminary Report (07-24-18 @ 07:53):    Testing in progress    Culture - Body Fluid with Gram Stain (collected 07-23-18 @ 09:00)  Source: Peritoneal Peritoneal Fluid  Gram Stain (07-23-18 @ 21:41):    polymorphonuclear leukocytes seen    No organisms seen    by cytocentrifuge  Preliminary Report (07-24-18 @ 17:24):    No growth    Culture - Acid Fast - Body Fluid w/Smear (collected 07-23-18 @ 09:00)  Source: Peritoneal Peritoneal Fluid                 Clostridium difficile Toxin by PCR (07.06.18 @ 16:19)    C Diff by PCR Result: Detected    Clostridium difficile Toxin by PCR: The results of this test should be interpreted with consideration of all  clinical and laboratory findings. This test determines the presence of  the C. difficile tcdB gene at a given time and is not intended to  identify antibiotic associated disease or C. difficile infection without  clinical context. Successful treatment is based on the resolution of  clinical symptoms. This test should not be used as a "test of cure"  because C. difficile DNA will persist after successful treatment. Repeat  testing will not be permitted.    This test is performed on the BD MAX system using Real-Time PCR and  fluorogenic target-specific hybridization.        < from: CT Abdomen and Pelvis w/ Oral Cont (07.06.18 @ 18:03) >    IMPRESSION:     Severe pancolitis consistent with pseudomembranous colitis.    Mild pulmonary edema.    Small loculated right and trace layering left pleural effusions.      < end of copied text >    < from: CT Abdomen and Pelvis No Cont (07.21.18 @ 11:15) >    IMPRESSION:     Stable appearance of pancolitis consistent with pseudomembranous colitis.   No pneumatosis or free air.    Increasing moderate ascites.    Anasarca.    Urinary bladder cystitis. Correlate with UA.      < end of copied text >        Radiology: all available radiological tests reviewed    Advanced directives addressed: full resuscitation

## 2018-07-27 NOTE — PROGRESS NOTE ADULT - SUBJECTIVE AND OBJECTIVE BOX
84 y/o M w/pmhx of Afib, CHF, CAD s/p stents, COPD, recent PNA on abx, upper GI bleed (duodenal)  BIB EMS from [facility] for fever, abd pain, and diarrhea that began 3 weeks ago. Was in the hospital for PNA tx and was later dx with C-diff and started on a course of PO vancomycin for 10 days for the C-diff. States that he has had diarrhea since before the course of abx. Pain has been increased for the past few weeks and is characterized as a cramping feeling. Daughters also note that there is increased distension. Also notes chest pain characterized as a cramping in the central chest. 83% O2 sat noted morning of admission on 7/6/18, at the assisted living facility. Takes O2 routinely at night but not during the day.    7/27/18 - Patient seen and examined at bedside. Hemodynamically stable, no events overnight. Pt had 2 bowel movements during the day, one was a stripe of brown stool and the other was more loose as per nurse. Nurse stated that BP was 117/22 and HR was 73 in the afternoon, Cardizam was still given. GI recommended increase vancomycin to 500 q6h, and flagyl  mg q8h restarted. Pt was not dialyzed today.    Vital Signs Last 24 Hrs  T(C): 36.8 (27 Jul 2018 17:19), Max: 37.2 (26 Jul 2018 21:09)  T(F): 98.2 (27 Jul 2018 17:19), Max: 99 (26 Jul 2018 21:09)  HR: 72 (27 Jul 2018 17:19) (72 - 87)  BP: 133/33 (27 Jul 2018 17:19) (112/28 - 142/39)  BP(mean): --  RR: 18 (27 Jul 2018 17:19) (18 - 20)  SpO2: 93% (27 Jul 2018 17:19) (92% - 96%)    PHYSICAL EXAM:  Constitutional: NAD  HEENT: PERR, EOMI  Neck: Soft and supple, No LAD,   Respiratory: Breath sounds are clear bilaterally, No wheezing, rales or rhonchi  Cardiovascular: S1 and S2, regular rate and rhythm, no Murmurs, gallops or rubs  Gastrointestinal: Bowel Sounds present, soft, tender to palpation RUQ and RLQ +, Distended+, no rigidity, no rebound  Extremities: LLE lesion with dressings c/d/i, RLE s/p AKA +  Vascular: 2+ peripheral pulses  Neurological: A/O x 3, no focal deficits  Musculoskeletal: unable to assess  Skin: Incontinence associated Dermatitis     MEDICATIONS  (STANDING):  ALBUTerol/ipratropium for Nebulization. 3 milliLiter(s) Nebulizer once  allopurinol 100 milliGRAM(s) Oral daily  aspirin  chewable 81 milliGRAM(s) Oral daily  buDESOnide   0.5 milliGRAM(s) Respule 0.5 milliGRAM(s) Inhalation two times a day  cholestyramine Powder (Sugar-Free) 4 Gram(s) Oral two times a day  diltiazem    Tablet 30 milliGRAM(s) Oral every 6 hours  doxazosin 8 milliGRAM(s) Oral at bedtime  epoetin nelly Injectable 3000 Unit(s) IV Push <User Schedule>  epoetin nelly Injectable 4000 Unit(s) IV Push <User Schedule>  finasteride 5 milliGRAM(s) Oral daily  heparin  Injectable 5000 Unit(s) SubCutaneous every 12 hours  metoclopramide 5 milliGRAM(s) Oral three times a day  metroNIDAZOLE  IVPB 250 milliGRAM(s) IV Intermittent every 8 hours  pantoprazole    Tablet 40 milliGRAM(s) Oral every 12 hours  simvastatin 10 milliGRAM(s) Oral at bedtime  sodium ferric gluconate complex Injectable 125 milliGRAM(s) IV Push <User Schedule>  vancomycin    Solution 500 milliGRAM(s) Oral every 6 hours    MEDICATIONS  (PRN):  acetaminophen   Tablet 650 milliGRAM(s) Oral every 8 hours PRN For Temp greater than 38 C (100.4 F)  acetaminophen   Tablet. 650 milliGRAM(s) Oral every 8 hours PRN Mild Pain (1 - 3)  albumin human 25% IVPB 50 milliLiter(s) IV Intermittent <User Schedule> PRN sbp<=120mmhg  ALBUTerol   0.5% 2.5 milliGRAM(s) Nebulizer every 4 hours PRN Shortness of Breath and/or Wheezing  diphenhydrAMINE   Capsule 25 milliGRAM(s) Oral at bedtime PRN sleep  HYDROmorphone  Injectable 0.5 milliGRAM(s) IV Push every 6 hours PRN Severe Pain (7 - 10)  ondansetron Injectable 4 milliGRAM(s) IV Push every 6 hours PRN Nausea and/or Vomiting                          8.0    4.28  )-----------( 188      ( 27 Jul 2018 07:00 )             26.8     07-27    141  |  110<H>  |  39<H>  ----------------------------<  89  4.3   |  21<L>  |  4.29<H>    Ca    7.5<L>      27 Jul 2018 07:00  Phos  3.7     07-27  Mg     1.8     07-27    TPro  x   /  Alb  2.3<L>  /  TBili  x   /  DBili  x   /  AST  x   /  ALT  x   /  AlkPhos  x   07-27        Blood Culture:   Culture - Urine (07.10.18 @ 16:30)    Specimen Source: .Urine Catheterized    Culture Results:   <10,000 CFU/ml  Normal Urogenital ravinder present    Culture - Blood (07.06.18 @ 13:42)    Specimen Source: .Blood Blood-Peripheral    Culture Results:   No growth at 5 days.          RADIOLOGY/EKG:    < from: CT Abdomen and Pelvis No Cont (07.21.18 @ 11:15) >  IMPRESSION:     Stable appearance of pancolitis consistent with pseudomembranous colitis.   No pneumatosis or free air.    Increasing moderate ascites.    Anasarca.    Urinary bladder cystitis. Correlate with UA.    < end of copied text >      < from: Xray Chest 1 View- PORTABLE-Urgent (07.16.18 @ 18:34) >  IMPRESSION:    Findings suggesting persistent mild CHF with mild pulmonary vascular   congestion and trace fluid in the right minor fissure.    Stable right CVC.    < end of copied text >    < from: Transthoracic Echocardiogram (07.10.18 @ 10:21) >   Impression     Summary     Fibrocalcific changes noted to the mitral valve leaflets with preserved   leaflet excursion.   Moderate (2+) mitral regurgitation is present.   The left atrium is moderately dilated.   Mild concentric left ventricular hypertrophy is present.   Estimated left ventricular ejection fraction is 55-60 %.    < end of copied text >    < from: CT Abdomen and Pelvis No Cont (07.21.18 @ 11:15) >  IMPRESSION:     Stable appearance of pancolitis consistent with pseudomembranous colitis.   No pneumatosis or free air.    Increasing moderate ascites.    < end of copied text >    FINDINGS:  1. Ascites on preprocedure ultrasound.  2. Access of the peritoneal cavity under direct ultrasound guidance with   a 5 French Yueh needle   3. 2800 cc of clear yellow fluid aspirated  4. Resolution of ascites on postprocedure ultrasound    IMPRESSION:  Successful ultrasound-guided paracentesis    PLAN:   1. Return to interventional radiology for repeat paracentesis as needed.  2. Follow laboratory studies as ordered    Peritoneal fluid analysis  PMN seen    Cytopathology of Ascitic fluid  negative for malignant cells, acute and chronic nflammationary cells present     Serum   Albumin= 2.3  Protein= 4.7   LDH= 160       Ascitis Fluid  LDH= 83, Protein= 2.8, Albumin= 1.7     DVT PPX: NO AC due to severe GI bleed in past-- on Heparin subQ 5000U q12h

## 2018-07-28 LAB
ALBUMIN SERPL ELPH-MCNC: 2.3 G/DL — LOW (ref 3.3–5)
ANION GAP SERPL CALC-SCNC: 10 MMOL/L — SIGNIFICANT CHANGE UP (ref 5–17)
BUN SERPL-MCNC: 52 MG/DL — HIGH (ref 7–23)
CALCIUM SERPL-MCNC: 7.7 MG/DL — LOW (ref 8.5–10.1)
CHLORIDE SERPL-SCNC: 109 MMOL/L — HIGH (ref 96–108)
CO2 SERPL-SCNC: 19 MMOL/L — LOW (ref 22–31)
CREAT SERPL-MCNC: 5.06 MG/DL — HIGH (ref 0.5–1.3)
CULTURE RESULTS: SIGNIFICANT CHANGE UP
GLUCOSE SERPL-MCNC: 68 MG/DL — LOW (ref 70–99)
HCT VFR BLD CALC: 26.7 % — LOW (ref 39–50)
HGB BLD-MCNC: 8.2 G/DL — LOW (ref 13–17)
MAGNESIUM SERPL-MCNC: 1.9 MG/DL — SIGNIFICANT CHANGE UP (ref 1.6–2.6)
MCHC RBC-ENTMCNC: 30.3 PG — SIGNIFICANT CHANGE UP (ref 27–34)
MCHC RBC-ENTMCNC: 30.7 GM/DL — LOW (ref 32–36)
MCV RBC AUTO: 98.5 FL — SIGNIFICANT CHANGE UP (ref 80–100)
NRBC # BLD: 0 /100 WBCS — SIGNIFICANT CHANGE UP (ref 0–0)
PHOSPHATE SERPL-MCNC: 4.3 MG/DL — SIGNIFICANT CHANGE UP (ref 2.5–4.5)
PLATELET # BLD AUTO: 201 K/UL — SIGNIFICANT CHANGE UP (ref 150–400)
POTASSIUM SERPL-MCNC: 4.4 MMOL/L — SIGNIFICANT CHANGE UP (ref 3.5–5.3)
POTASSIUM SERPL-SCNC: 4.4 MMOL/L — SIGNIFICANT CHANGE UP (ref 3.5–5.3)
RBC # BLD: 2.71 M/UL — LOW (ref 4.2–5.8)
RBC # FLD: 19.5 % — HIGH (ref 10.3–14.5)
SODIUM SERPL-SCNC: 138 MMOL/L — SIGNIFICANT CHANGE UP (ref 135–145)
SPECIMEN SOURCE: SIGNIFICANT CHANGE UP
WBC # BLD: 4.6 K/UL — SIGNIFICANT CHANGE UP (ref 3.8–10.5)
WBC # FLD AUTO: 4.6 K/UL — SIGNIFICANT CHANGE UP (ref 3.8–10.5)

## 2018-07-28 RX ADMIN — Medication 500 MILLIGRAM(S): at 15:11

## 2018-07-28 RX ADMIN — Medication 500 MILLIGRAM(S): at 00:36

## 2018-07-28 RX ADMIN — Medication 81 MILLIGRAM(S): at 15:22

## 2018-07-28 RX ADMIN — Medication 50 MILLIGRAM(S): at 22:22

## 2018-07-28 RX ADMIN — Medication 0.5 MILLIGRAM(S): at 08:26

## 2018-07-28 RX ADMIN — Medication 5 MILLIGRAM(S): at 22:22

## 2018-07-28 RX ADMIN — CHOLESTYRAMINE 4 GRAM(S): 4 POWDER, FOR SUSPENSION ORAL at 22:22

## 2018-07-28 RX ADMIN — Medication 25 MILLIGRAM(S): at 00:53

## 2018-07-28 RX ADMIN — HYDROMORPHONE HYDROCHLORIDE 0.5 MILLIGRAM(S): 2 INJECTION INTRAMUSCULAR; INTRAVENOUS; SUBCUTANEOUS at 12:36

## 2018-07-28 RX ADMIN — FINASTERIDE 5 MILLIGRAM(S): 5 TABLET, FILM COATED ORAL at 13:53

## 2018-07-28 RX ADMIN — Medication 50 MILLIGRAM(S): at 06:27

## 2018-07-28 RX ADMIN — CHOLESTYRAMINE 4 GRAM(S): 4 POWDER, FOR SUSPENSION ORAL at 10:28

## 2018-07-28 RX ADMIN — Medication 0.5 MILLIGRAM(S): at 20:15

## 2018-07-28 RX ADMIN — Medication 5 MILLIGRAM(S): at 15:12

## 2018-07-28 RX ADMIN — Medication 500 MILLIGRAM(S): at 18:34

## 2018-07-28 RX ADMIN — Medication 50 MILLIGRAM(S): at 15:11

## 2018-07-28 RX ADMIN — Medication 8 MILLIGRAM(S): at 22:22

## 2018-07-28 RX ADMIN — ERYTHROPOIETIN 4000 UNIT(S): 10000 INJECTION, SOLUTION INTRAVENOUS; SUBCUTANEOUS at 13:25

## 2018-07-28 RX ADMIN — Medication 125 MILLIGRAM(S): at 13:25

## 2018-07-28 RX ADMIN — Medication 100 MILLIGRAM(S): at 15:11

## 2018-07-28 RX ADMIN — HEPARIN SODIUM 5000 UNIT(S): 5000 INJECTION INTRAVENOUS; SUBCUTANEOUS at 18:33

## 2018-07-28 RX ADMIN — PANTOPRAZOLE SODIUM 40 MILLIGRAM(S): 20 TABLET, DELAYED RELEASE ORAL at 18:33

## 2018-07-28 RX ADMIN — ERYTHROPOIETIN 3000 UNIT(S): 10000 INJECTION, SOLUTION INTRAVENOUS; SUBCUTANEOUS at 13:25

## 2018-07-28 RX ADMIN — SIMVASTATIN 10 MILLIGRAM(S): 20 TABLET, FILM COATED ORAL at 22:22

## 2018-07-28 NOTE — PROGRESS NOTE ADULT - SUBJECTIVE AND OBJECTIVE BOX
Patient is a 83y old  Male who presents with a chief complaint of complain of diarrhea, fever (06 Jul 2018 23:55)    Date of service: 07-28-18 @ 16:44      Patient noted with 4 bowel movements so far today  Notes right lower quadrant pain, otherwise he states he is "okay"      ROS: no fever or chills; denies dizziness, no HA, no SOB or cough,  no constipation; no dysuria, no urinary frequency, no legs pain, no rashes    MEDICATIONS  (STANDING):  ALBUTerol/ipratropium for Nebulization. 3 milliLiter(s) Nebulizer once  allopurinol 100 milliGRAM(s) Oral daily  aspirin  chewable 81 milliGRAM(s) Oral daily  buDESOnide   0.5 milliGRAM(s) Respule 0.5 milliGRAM(s) Inhalation two times a day  cholestyramine Powder (Sugar-Free) 4 Gram(s) Oral two times a day  diltiazem    Tablet 30 milliGRAM(s) Oral every 6 hours  doxazosin 8 milliGRAM(s) Oral at bedtime  epoetin nelly Injectable 3000 Unit(s) IV Push <User Schedule>  epoetin nelly Injectable 4000 Unit(s) IV Push <User Schedule>  finasteride 5 milliGRAM(s) Oral daily  heparin  Injectable 5000 Unit(s) SubCutaneous every 12 hours  metoclopramide 5 milliGRAM(s) Oral three times a day  metroNIDAZOLE  IVPB 250 milliGRAM(s) IV Intermittent every 8 hours  pantoprazole    Tablet 40 milliGRAM(s) Oral every 12 hours  simvastatin 10 milliGRAM(s) Oral at bedtime  sodium ferric gluconate complex Injectable 125 milliGRAM(s) IV Push <User Schedule>  vancomycin    Solution 500 milliGRAM(s) Oral every 6 hours    MEDICATIONS  (PRN):  acetaminophen   Tablet 650 milliGRAM(s) Oral every 8 hours PRN For Temp greater than 38 C (100.4 F)  acetaminophen   Tablet. 650 milliGRAM(s) Oral every 8 hours PRN Mild Pain (1 - 3)  albumin human 25% IVPB 50 milliLiter(s) IV Intermittent <User Schedule> PRN sbp<=120mmhg  ALBUTerol   0.5% 2.5 milliGRAM(s) Nebulizer every 4 hours PRN Shortness of Breath and/or Wheezing  diphenhydrAMINE   Capsule 25 milliGRAM(s) Oral at bedtime PRN sleep  HYDROmorphone  Injectable 0.5 milliGRAM(s) IV Push every 6 hours PRN Severe Pain (7 - 10)  ondansetron Injectable 4 milliGRAM(s) IV Push every 6 hours PRN Nausea and/or Vomiting      Vital Signs Last 24 Hrs  T(C): 36.3 (28 Jul 2018 05:27), Max: 36.8 (27 Jul 2018 17:19)  T(F): 97.3 (28 Jul 2018 05:27), Max: 98.2 (27 Jul 2018 17:19)  HR: 77 (28 Jul 2018 13:51) (71 - 94)  BP: 157/94 (28 Jul 2018 13:51) (133/33 - 159/65)  BP(mean): --  RR: 16 (28 Jul 2018 13:51) (16 - 18)  SpO2: 96% (28 Jul 2018 05:27) (93% - 96%)    Physical Exam:        PE:    Constitutional: frail looking  HEENT: NC/AT, EOMI, PERRLA, conjunctivae clear; ears and nose atraumatic; pharynx clear  Neck: supple; thyroid not palpable  Respiratory: respiratory effort normal; clear to auscultation  Cardiovascular: S1S2 regular, no murmurs  Abdomen: soft, right lower quadrant tender, difusely distended, positive BS; no liver or spleen organomegaly  Genitourinary: no suprapubic tenderness  Musculoskeletal: s/p right lower extremity amputation; left lower extremity with dressing in place  Neurological/ Psychiatric: AxOx3, judgement and insight normal;  moving all extremities  Skin: no rashes; no palpable lesions    Labs: all available labs reviewed                        Labs:                        Labs:                        8.2    4.60  )-----------( 201      ( 28 Jul 2018 07:24 )             26.7     07-28    138  |  109<H>  |  52<H>  ----------------------------<  68<L>  4.4   |  19<L>  |  5.06<H>    Ca    7.7<L>      28 Jul 2018 07:24  Phos  4.3     07-28  Mg     1.9     07-28    TPro  x   /  Alb  2.3<L>  /  TBili  x   /  DBili  x   /  AST  x   /  ALT  x   /  AlkPhos  x   07-28           Cultures:       Culture - Fungal, Body Fluid (collected 07-23-18 @ 09:00)  Source: Peritoneal Peritoneal Fluid  Preliminary Report (07-24-18 @ 07:53):    Testing in progress    Culture - Body Fluid with Gram Stain (collected 07-23-18 @ 09:00)  Source: Peritoneal Peritoneal Fluid  Gram Stain (07-23-18 @ 21:41):    polymorphonuclear leukocytes seen    No organisms seen    by cytocentrifuge  Final Report (07-28-18 @ 16:27):    No growth at 5 days    Culture - Acid Fast - Body Fluid w/Smear (collected 07-23-18 @ 09:00)  Source: Peritoneal Peritoneal Fluid                 Clostridium difficile Toxin by PCR (07.06.18 @ 16:19)    C Diff by PCR Result: Detected    Clostridium difficile Toxin by PCR: The results of this test should be interpreted with consideration of all  clinical and laboratory findings. This test determines the presence of  the C. difficile tcdB gene at a given time and is not intended to  identify antibiotic associated disease or C. difficile infection without  clinical context. Successful treatment is based on the resolution of  clinical symptoms. This test should not be used as a "test of cure"  because C. difficile DNA will persist after successful treatment. Repeat  testing will not be permitted.    This test is performed on the BD MAX system using Real-Time PCR and  fluorogenic target-specific hybridization.        < from: CT Abdomen and Pelvis w/ Oral Cont (07.06.18 @ 18:03) >    IMPRESSION:     Severe pancolitis consistent with pseudomembranous colitis.    Mild pulmonary edema.    Small loculated right and trace layering left pleural effusions.      < end of copied text >    < from: CT Abdomen and Pelvis No Cont (07.21.18 @ 11:15) >    IMPRESSION:     Stable appearance of pancolitis consistent with pseudomembranous colitis.   No pneumatosis or free air.    Increasing moderate ascites.    Anasarca.    Urinary bladder cystitis. Correlate with UA.      < end of copied text >        Radiology: all available radiological tests reviewed    Advanced directives addressed: full resuscitation

## 2018-07-28 NOTE — PROGRESS NOTE ADULT - PROBLEM SELECTOR PLAN 1
- vanc increased from 125 mg -> 500 mg q6H -Then 3 times a day for 2 weeks, then twice a day for one week, then daily for one week, then every other day for 1 week, then every 3rd day for 1 week. Recommend follow-up with Dr. Grullon after d/c  - s/p Flagyl 10 days- restarted flagyl  mg q8h on 7/27  - ID appreciated, continue PO vancomycin  - GI appreciated, taper regimen structured  - Colorectal Consult appreciated, would not consider intervention at this time  -Paracentesis 7/23/18- 2800 cc removed, cytopathology report-no malig, inflammatory cells, PMNs seen on analysis of fluid  -SAAG= .6 (<1.1), therefore not pointing towards HF, or portal hypertension--DDx:  serositis - vanc increased from 125 mg -> 500 mg q6H -Then 3 times a day for 2 weeks, then twice a day for one week, then daily for one week, then every other day for 1 week, then every 3rd day for 1 week. Recommend follow-up with Dr. Grullon after d/c  - s/p Flagyl 10 days- restarted flagyl  mg q8h on 7/27  - ID appreciated, continue PO vancomycin  - GI appreciated, taper regimen structured  -Paracentesis 7/23/18- 2800 cc removed, cytopathology report-no malig, inflammatory cells, PMNs seen on analysis of fluid  -SAAG= .6 (<1.1), therefore not pointing towards HF, or portal hypertension--DDx:  serositis

## 2018-07-28 NOTE — PROGRESS NOTE ADULT - SUBJECTIVE AND OBJECTIVE BOX
Patient is a 83y old  Male who presents with a chief complaint of Fever/Diarrhea (20 Jul 2018 13:30)      HPI:  Pt is a 84 y/o M w/pmhx of Afib, CHF, CAD s/p stents, COPD, PNA, upper GI bleed (duodenal)  BIB EMS from [facility] assisted living for fever, abd pain, and diarrhea that began 3 weeks ago. Was in the hospital for PNA tx and was later dx with C-diff and started on a course of PO vancomycin for 10 days for the C-diff. States that he has had diarrhea since before the course of abx. Pain has been increased for the past few weeks and is characterized as a cramping feeling. Daughters also note that there is increased distension. Also notes chest pain characterized as a cramping in the central chest. 83% O2 sat noted this morning at the assisted living facility. Takes O2 routinely at night but not during the day. (06 Jul 2018 23:55)      mild RLQ pain madalyn tis a bit better  cont with diarrhea, 8 times yesterday  neg CP or sob  vida diet      PAST MEDICAL & SURGICAL HISTORY:  Diastolic CHF  Peripheral vascular disease  Afib  Anemia  CKD (chronic kidney disease)  COPD (chronic obstructive pulmonary disease)  SHAYY (obstructive sleep apnea)  Sepsis, due to unspecified organism: 2/2 poorly healing wounds b/l  Dyspepsia: On moderate exertion.  Sleep apnea, obstructive: Requires home 02 therapy, and treatment with BIPAP  Atelectasis  Pleural effusion, bilateral  Respiratory failure  Peripheral edema  CRI (chronic renal insufficiency)  Gout  Benign prostatic hypertrophy  Spinal stenosis  Hypercholesterolemia  GERD (gastroesophageal reflux disease)  CAD (coronary artery disease)  Hypertension  S/P angioplasty with stent  Cataract of left eye  Prostate: Surgery green light procedure.  S/P rotator cuff surgery: Right  S/P angioplasty      MEDICATIONS  (STANDING):  ALBUTerol/ipratropium for Nebulization. 3 milliLiter(s) Nebulizer once  allopurinol 100 milliGRAM(s) Oral daily  aspirin  chewable 81 milliGRAM(s) Oral daily  buDESOnide   0.5 milliGRAM(s) Respule 0.5 milliGRAM(s) Inhalation two times a day  cholestyramine Powder (Sugar-Free) 4 Gram(s) Oral two times a day  diltiazem    Tablet 30 milliGRAM(s) Oral every 6 hours  doxazosin 8 milliGRAM(s) Oral at bedtime  epoetin nelly Injectable 3000 Unit(s) IV Push <User Schedule>  epoetin nelly Injectable 4000 Unit(s) IV Push <User Schedule>  finasteride 5 milliGRAM(s) Oral daily  heparin  Injectable 5000 Unit(s) SubCutaneous every 12 hours  metoclopramide 5 milliGRAM(s) Oral three times a day  metroNIDAZOLE  IVPB 250 milliGRAM(s) IV Intermittent every 8 hours  pantoprazole    Tablet 40 milliGRAM(s) Oral every 12 hours  simvastatin 10 milliGRAM(s) Oral at bedtime  sodium ferric gluconate complex Injectable 125 milliGRAM(s) IV Push <User Schedule>  vancomycin    Solution 500 milliGRAM(s) Oral every 6 hours    MEDICATIONS  (PRN):  acetaminophen   Tablet 650 milliGRAM(s) Oral every 8 hours PRN For Temp greater than 38 C (100.4 F)  acetaminophen   Tablet. 650 milliGRAM(s) Oral every 8 hours PRN Mild Pain (1 - 3)  albumin human 25% IVPB 50 milliLiter(s) IV Intermittent <User Schedule> PRN sbp<=120mmhg  ALBUTerol   0.5% 2.5 milliGRAM(s) Nebulizer every 4 hours PRN Shortness of Breath and/or Wheezing  diphenhydrAMINE   Capsule 25 milliGRAM(s) Oral at bedtime PRN sleep  HYDROmorphone  Injectable 0.5 milliGRAM(s) IV Push every 6 hours PRN Severe Pain (7 - 10)  ondansetron Injectable 4 milliGRAM(s) IV Push every 6 hours PRN Nausea and/or Vomiting      Allergies    Zosyn (Rash)    Intolerances        SOCIAL HISTORY:  NC  FAMILY HISTORY:  No pertinent family history in first degree relatives      REVIEW OF SYSTEMS:    CONSTITUTIONAL: No weakness, fevers or chills  EYES/ENT: No visual changes;  No vertigo or throat pain   NECK: No pain or stiffness  RESPIRATORY: No cough, wheezing, hemoptysis; No shortness of breath  CARDIOVASCULAR: No chest pain or palpitations  GENITOURINARY: No dysuria, frequency or hematuria  NEUROLOGICAL: No numbness or weakness  SKIN: No itching, burning, rashes, or lesions   All other review of systems is negative unless indicated above.    Vital Signs Last 24 Hrs  T(C): 36.3 (28 Jul 2018 05:27), Max: 36.8 (27 Jul 2018 17:19)  T(F): 97.3 (28 Jul 2018 05:27), Max: 98.2 (27 Jul 2018 17:19)  HR: 77 (28 Jul 2018 13:51) (71 - 94)  BP: 157/94 (28 Jul 2018 13:51) (133/33 - 159/65)  BP(mean): --  RR: 16 (28 Jul 2018 13:51) (16 - 18)  SpO2: 96% (28 Jul 2018 05:27) (93% - 96%)    PHYSICAL EXAM:    Constitutional: NAD, well-developed  HEENT: EOMI, throat clear  Neck: No LAD, supple  Respiratory: CTA and P  Cardiovascular: S1 and S2, RRR, no M  Gastrointestinal: BS+, soft, NT/ND, neg HSM,  Extremities: No peripheral edema, neg clubing, cyanosis  Vascular: 2+ peripheral pulses  Neurological: A/O x 3, no focal deficits  Psychiatric: Normal mood, normal affect  Skin: No rashes    LABS:  CBC Full  -  ( 28 Jul 2018 07:24 )  WBC Count : 4.60 K/uL  Hemoglobin : 8.2 g/dL  Hematocrit : 26.7 %  Platelet Count - Automated : 201 K/uL  Mean Cell Volume : 98.5 fl  Mean Cell Hemoglobin : 30.3 pg  Mean Cell Hemoglobin Concentration : 30.7 gm/dL  Auto Neutrophil # : x  Auto Lymphocyte # : x  Auto Monocyte # : x  Auto Eosinophil # : x  Auto Basophil # : x  Auto Neutrophil % : x  Auto Lymphocyte % : x  Auto Monocyte % : x  Auto Eosinophil % : x  Auto Basophil % : x    07-28    138  |  109<H>  |  52<H>  ----------------------------<  68<L>  4.4   |  19<L>  |  5.06<H>    Ca    7.7<L>      28 Jul 2018 07:24  Phos  4.3     07-28  Mg     1.9     07-28    TPro  x   /  Alb  2.3<L>  /  TBili  x   /  DBili  x   /  AST  x   /  ALT  x   /  AlkPhos  x   07-28            RADIOLOGY & ADDITIONAL STUDIES:

## 2018-07-28 NOTE — PROGRESS NOTE ADULT - SUBJECTIVE AND OBJECTIVE BOX
82 y/o M w/pmhx of Afib (CHADVASC 3, not on AC, GI bleed), CHF, CAD s/p stents, COPD, recent PNA on abx, upper GI bleed (duodenal)  BIB EMS from [facility] assisted [facility] for fever, abd pain, and diarrhea that began 3 weeks ago. Was in the hospital for PNA tx and was later dx with C-diff and started on a course of PO vancomycin for 10 days for the C-diff. States that he has had diarrhea since before the course of abx. Pain has been increased for the past few weeks and is characterized as a cramping feeling. Daughters also note that there is increased distension. Also notes chest pain characterized as a cramping in the central chest. 83% O2 sat noted morning of admission on 7/6/18, at the assisted living facility. Takes O2 routinely at night but not during the day.    7/25: seen and eval. patient very deconditioned and frail. Currently getting HD. Paracentesis reviewed. No overnight fevers, chills or shakes. Labs and vitals reviewed. Patient denies any HA, CP, SOB.     7/26: Patient seen and eval. hemodynamically stable. very frail. complaining of (R) sided abdominal pain 6/10, comfortable with positioning. No fevers, chills. Care discussed with Dr. Palacio. Care discussed with the family at the bedside. Patient denies any HA, CP, SOB.     7/27: deconditioned frail, less abdominal pain today. more awake. tolerating po diet. care discussed with daughter at the bedside. labs and vitals reviewed.     ROS:  still with mild abdominal pain, 3 episodes of diarrhea today.     PHYSICAL EXAM:  T(C): 36.3 (28 Jul 2018 05:27), Max: 36.8 (27 Jul 2018 17:19)  T(F): 97.3 (28 Jul 2018 05:27), Max: 98.2 (27 Jul 2018 17:19)  HR: 72 (28 Jul 2018 05:27) (71 - 79)  BP: 151/44 (28 Jul 2018 05:27) (117/22 - 151/44)  RR: 18 (28 Jul 2018 05:27) (18 - 18)  SpO2: 96% (28 Jul 2018 05:27) (93% - 96%)  Constitutional: very deconditioned, frail, sick appearing  HEENT: PERR, EOMI, Normal Hearing, MMM  Neck: no JVD  Respiratory: Breath sounds are clear bilaterally, No wheezing, rales or rhonchi  Cardiovascular: S1 and S2, regular rate and rhythm, no Murmurs, gallops or rubs  Gastrointestinal:  Distended+, no rigidity, no rebound  Extremities: LLE lesion with dressings c/d/i, RLE s/p AKA +  Vascular: 2+ peripheral pulses  Neurological: A/O x 3, no focal deficits  Musculoskeletal: unable to assess  Skin: Incontinence associated Dermatitis with Rectal tube+ output green/loose stool    LABS:   CBC Full  -  ( 27 Jul 2018 07:00 )  WBC Count : 4.28 K/uL  Hemoglobin : 8.0 g/dL  Hematocrit : 26.8 %  Platelet Count - Automated : 188 K/uL    141  |  110<H>  |  39<H>  ----------------------------<  89  4.3   |  21<L>  |  4.29<H>    Ca    7.5<L>      27 Jul 2018 07:00  Phos  3.7     07-27  Mg     1.8     07-27    TPro  x   /  Alb  2.3<L>  /  TBili  x   /  DBili  x   /  AST  x   /  ALT  x   /  AlkPhos  x   07-27    # Pseudomembranous colitis  - Due to severe C. difficile, patient is anticipated to be on a prolonged antibiotic taper  - back on vancomycin 500 mg po q 6 hours  - restart flagyl 250 mg iv q 8 hours, lower dose given dialysis  - Then 3 times a day for 2 weeks, then twice a day for one week, then daily for one week, then every other day for 1 week, then every 3rd day for 1 week.  - tolerating antibiotics without rashes or side effects   - continue contact isolation      # Acute respiratory failure with hypoxia   - continue to maintain fluid balance with HD MWF   - control HFpEF with medical management   - O2 as needed  - Pulm appreciated, add nebs  - Incentive Spirometry  - CXR: improvement of Pulmonary Vascular Congestion  - CT Abdomen: Moderate Ascites - contributing to desaturation;   - 3L NC O2 at night and on exertion as needed.     # Renal insufficiency  - s/p Tunnel Cath on 7/16/18, patient currently HD dependent  - patient currently on HD.     # Afib   - Currently sinus  - No AC due to hx of Severe GI bleed  - BP does not tolerate Cardizem or Beta Blocker.     # Mobitz type 2 second degree AV block  - non recurrent, now in sinus rhythm RRR  - EP consult appreciated: likely vagal, may be 2/2 SHAYY.

## 2018-07-28 NOTE — PROGRESS NOTE ADULT - SUBJECTIVE AND OBJECTIVE BOX
NEPHROLOGY INTERVAL HPI/OVERNIGHT EVENTS:  7/28 SY  Resting comfortably today.  No new complaints.  Reports improved Bms.  Still mild continued abdominal discomfort.    7/27   no c/o  11 episodes of diarrhea ( ?related to reglan)  2+ incont UOP in diaper  toelrating po   IV flagyll added and on higher dose of po vanc  ID/GI input noted_ reglan dec    7/26 SY  Not feeling well this am.  Complains of lower abdominal pain again.  Does report increased urine output.    7/25 SY  No acute events.  Again reports increased urine output.--Not recorded.  Complains of continued diarrhea.    7/24 SY  Post HD yesterday.  post Paracentesis.     Reports feeling much better.  Reports increased urine output.      MEDICATIONS  (STANDING):  ALBUTerol/ipratropium for Nebulization. 3 milliLiter(s) Nebulizer once  allopurinol 100 milliGRAM(s) Oral daily  aspirin  chewable 81 milliGRAM(s) Oral daily  buDESOnide   0.5 milliGRAM(s) Respule 0.5 milliGRAM(s) Inhalation two times a day  cholestyramine Powder (Sugar-Free) 4 Gram(s) Oral two times a day  diltiazem    Tablet 30 milliGRAM(s) Oral every 6 hours  doxazosin 8 milliGRAM(s) Oral at bedtime  epoetin nelly Injectable 3000 Unit(s) IV Push <User Schedule>  epoetin nelly Injectable 4000 Unit(s) IV Push <User Schedule>  finasteride 5 milliGRAM(s) Oral daily  heparin  Injectable 5000 Unit(s) SubCutaneous every 12 hours  metoclopramide 5 milliGRAM(s) Oral three times a day  metroNIDAZOLE  IVPB 250 milliGRAM(s) IV Intermittent every 8 hours  pantoprazole    Tablet 40 milliGRAM(s) Oral every 12 hours  simvastatin 10 milliGRAM(s) Oral at bedtime  sodium ferric gluconate complex Injectable 125 milliGRAM(s) IV Push <User Schedule>  vancomycin    Solution 500 milliGRAM(s) Oral every 6 hours    MEDICATIONS  (PRN):  acetaminophen   Tablet 650 milliGRAM(s) Oral every 8 hours PRN For Temp greater than 38 C (100.4 F)  acetaminophen   Tablet. 650 milliGRAM(s) Oral every 8 hours PRN Mild Pain (1 - 3)  albumin human 25% IVPB 50 milliLiter(s) IV Intermittent <User Schedule> PRN sbp<=120mmhg  ALBUTerol   0.5% 2.5 milliGRAM(s) Nebulizer every 4 hours PRN Shortness of Breath and/or Wheezing  diphenhydrAMINE   Capsule 25 milliGRAM(s) Oral at bedtime PRN sleep  HYDROmorphone  Injectable 0.5 milliGRAM(s) IV Push every 6 hours PRN Severe Pain (7 - 10)  ondansetron Injectable 4 milliGRAM(s) IV Push every 6 hours PRN Nausea and/or Vomiting          Vital Signs Last 24 Hrs  T(C): 36.3 (28 Jul 2018 05:27), Max: 36.8 (27 Jul 2018 17:19)  T(F): 97.3 (28 Jul 2018 05:27), Max: 98.2 (27 Jul 2018 17:19)  HR: 80 (28 Jul 2018 09:45) (71 - 80)  BP: 134/34 (28 Jul 2018 09:45) (117/22 - 151/44)  BP(mean): --  RR: 18 (28 Jul 2018 05:27) (18 - 18)  SpO2: 96% (28 Jul 2018 05:27) (93% - 96%)  Daily     Daily     07-27 @ 07:01  -  07-28 @ 07:00  --------------------------------------------------------  IN: 50 mL / OUT: 75 mL / NET: -25 mL        PHYSICAL EXAM:  Alert and appropriate  GENERAL: No distress  CHEST/LUNG: fair air entry  HEART: S1S2 RRR  ABDOMEN: distended   EXTREMITIES: no significant edema in RLE and Left stump.  SKIN:     LABS:                        8.2    4.60  )-----------( 201      ( 28 Jul 2018 07:24 )             26.7     07-28    138  |  109<H>  |  52<H>  ----------------------------<  68<L>  4.4   |  19<L>  |  5.06<H>    Ca    7.7<L>      28 Jul 2018 07:24  Phos  4.3     07-28  Mg     1.9     07-28    TPro  x   /  Alb  2.3<L>  /  TBili  x   /  DBili  x   /  AST  x   /  ALT  x   /  AlkPhos  x   07-28        Magnesium, Serum: 1.9 mg/dL (07-28 @ 07:24)  Phosphorus Level, Serum: 4.3 mg/dL (07-28 @ 07:24)          RADIOLOGY & ADDITIONAL TESTS:

## 2018-07-28 NOTE — PROGRESS NOTE ADULT - ASSESSMENT
Pseudomembranous colitis status post recent antibiotics    By mouth vancomycin   case discussed with  family  Patient with continued abdominal pain and possible inc diarrhea, discussed with ID,  increased vanco to 500 4 times a day and add IV Flagyl.  DW ID    not clear if c diff not cleared, will follow    Discussed with family, we will decrease Reglan dosage.      A fibrillation    Would hold anticoagulation secondary to history bleeding and colitis    COPD obesity, obstructive sleep apnea, coronary artery disease, overall comorbid risk factors     IVF per renal  HD as needed      acites, SP paracentesis

## 2018-07-28 NOTE — PROGRESS NOTE ADULT - PROBLEM SELECTOR PLAN 3
- s/p Tunnel Cath on 7/16/18, patient currently HD dependent  - Nephro consult appreciated: cont HD, Creatinine improving - s/p Tunnel Cath on 7/16/18, patient currently HD dependent  - Nephro consult appreciated: cont HD, Creatinine worsened past couple days  -HD today, no UF, no fluid restrictions

## 2018-07-28 NOTE — PROGRESS NOTE ADULT - ASSESSMENT
82 y/o wm with hx of ckd stage 3 ( scr 1.5-1.7) with ARYA due to volume depletion from CDiff associated colitis and diarrhea in presence of diuretic use PTA.  Now with non anion gap metabolic acidosis and worsening renal parameters.  CXR with mild CHF with hx of diastolic CHF.    PLAN  - obtain abd and lactate  - will change ivf and add bicarbonate and keep at current rate  - hold lasix done.   - fu uop and scr closely and will monitor respiratory status  - bp and hr stable now, cardizem adjusted and lopressor added    7/9 MK  - ARYA: worsening renal parameters with metabolic acidosis, non ag.  Previous lactate WNL and since placed on bicarbonate drip  fu repeat abg today.  Increase IVF rate  possibility of HD discussed with enio, hcp daughter, pt has been agreeable in past.   DC hydralazine  dc morphine and change to dilaudid  - Cdiff with rising scr and worsening abd distension: CRS consult ongoing  possible repeat ct scan  DW Dr ballesteros    7/10  Anuric  anasarca  Non anion gap acidosis on bicarb drip  ARYA, anuric  CKD 3 history  d/w Family, and pt agreeable  called ICU for line placement and to dialyze the pt in ICU for monitoring    7/10  HD initiated via R temp HD catheter  tolerating well  no complaints  good abf    7/11 SY  --ARYA with Anasarca.  Urine output slightly improved.  However, remains quite fluid overloaded.  Will plan to continue HD until fluid status is much improved.  HD order written.  3 hour tx today.  Will aim to remove 2.5 kg as tolerated.  --C DIff colitis ; continue to monitor closely.    7/12 SY  --ARYA with Anasarca.   Remains Oligo-anuric.    --HD with goal of 2.5 kg UF again today.  --C Diff colitis.   Clinically improved   Continue to monitor.    7/13  The patient was dialyzed 3 days in a row this week  Discussed with the patient's daughter and hospitalist, intensivist  Will skip dialysis today, no urgent need for dialysis  We'll dialyze the patient tomorrow on Saturday and then skip Sunday to dialyze the patient again on Monday.    7/14  We'll dialyze against 4 K bath  Case discussed with the patient's daughter  May need a permanent catheter by Monday      7/15  Dialyze with a 4K bath  Potassium is 3.3 most likely from the diarrhea  Schedule for permacath placement after dialysis on Monday or Tuesday  Patient is comfortable no complications noted at this time    7/16 MK  - ARYA/ CKD stage 3 remains oliguirc and HD dependent.  LImited UF at HD toleated with the   hypokalemia corrected with HD.  Permcath planned today  - Cdiff: on PO vanc.  improved leukocytosis and abd distension     7/17  s/p perm cath  HD tomorrow  4 K bath  replace K   overall stable    7/18 SY  --ARYA/CKD for now remaining dialysis dependent.  Continue TIW HD.  --C Diff colitis ; improving clinically.    7/19 SY  --ARYA/CKD : Continue TIW HD.   Will continue as outpatient for now as no signs of recovery at this point.  --C Diff colitis  : clinically improving.  Continue po abtx.  --D/c Planning  D/w pt's daughter re : rehab with HD.  --Replete K.  Continue regular diet without restrictions for now.    7/20 MK  - ARYA/CKD remains HD dependent with oliguria.  Will attempt UF with HD.  K correction with HD and changed to regular diet with fluid restriction  - Cdiff: PO vanc with improving renal paramenters-  - Episodes of Desat: will have pulmonary re-eval prior to dc.  - DC planning rehab with dialysis  dw Dr Hightower  will see pt    7/21 MK  - ARYA/CKD remains hd dependent. post hd cxr with improved PVC.  Still with episodes of desaturation and dr hightower input noted.  CT with evidence of ascites for paracentesis  - cdiff: on po vanc.  still with loose stools,     7/22 MK  - ARYA/CKD remains HD dependent.  Will coordinate AM HD based upon timing of paracentesis, hypokalemia improved  - hypoxia with ascites: for paracentesis   - cdif on po vanc, rectal tube in place    7/23 MK  - ARYA/CKD remains HD dependent, toelrating hd.  + inc uop   - hypoxia with ascites: s/p paracentesis today, monitor response to oxygenation  - AOCD; fu trend of h/h, on epogen  - cdiff: po vanc    7/24 SY  --ARYA/CKD  Urine output may be increasing.  Follow creat trend to determine further dialysis support.  --Post Paracentesis : improve resp status.  --Anemia : continue LETICIA.  --C Diff : continue po Vanco.    7/25 SY  --ARYA/CKD  Monitor urine output.  Noted with increased creat.  Will maintain on TIW HD since fluid overloaded state is persistent.  --Monitor GI function.  --Post Paracentesis : resp status stabilized.  --Anemia :continue LETICIA.    7/26 SY  --ARYA/CKD  : Creat slowly  trending down.  Urine output not recorded.  Check creat in am to determine whether HD can be held off.  --Recurrent abdominal pain of concern.  Being assessed by primary team and GI.  --Bladder scan to assure full void.    7/27 MK  - ARYA/CKD: with + inc Uop and significant volume depletion overnight.  Bladder scan of 77 ml with stable respiratory status.  NO Hd today and reassess in the AM.      7/28 SY  --ARYA /CKD : positive increase in urine output. However, renal parameters remain elevated.  Therefore, will maintain on TIW HD schedule for now.  --Pt's fluid status has much improved.   Will HD today with no UF and allow increased fluid intake without restrictions.  --C DIff : continue to monitor and continue po abtx.

## 2018-07-28 NOTE — PROGRESS NOTE ADULT - ASSESSMENT
Pt is a 82 y/o M w/pmhx of Afib, CHF, CAD s/p stents, COPD, PNA, upper GI bleed (duodenal), PVD, s/p right lower extremity amputation,  recent hospitalization for pneumonia and subsequent Cdiff colitis admitted on 7/6 from assisted living for evaluation of persistent abdominal pain, fever and diarrhea that has been ongoing despite the po vancomycin course that the patient had been on. The patient also notes mid epigastric/chest pain. History per daughter at bedside and medical record as patient is poor historian.  1. Patient admitted with severe Cdiff pan colitis, also noted with leukocytosis most likely reactive to Cdiff infection  - follow up cultures   - iv hydration and supportive care   - serial cbc and monitor temperature   - back on vancomycin 500 mg po q 6 hours  - restarted flagyl 250 mg iv q 8 hours, lower dose given dialysis  - continue contact isolation  - limit antibiotics exposure  2. other issues: Afib, CHF, CAD s/p stents, COPD, PNA, upper GI bleed (duodenal), PVD, s/p right lower extremity amputation  - per medicine  3. Bladder wall thickening most likely due to the fact that patient now dialysis patient  - would avoid starting antibiotics in this patient with severe CDiff colitis  - monitor off antibiotics

## 2018-07-28 NOTE — PROGRESS NOTE ADULT - SUBJECTIVE AND OBJECTIVE BOX
84 y/o M w/pmhx of Afib, CHF, CAD s/p stents, COPD, recent PNA on abx, upper GI bleed (duodenal)  BIB EMS from [facility] for fever, abd pain, and diarrhea that began 3 weeks ago. Was in the hospital for PNA tx and was later dx with C-diff and started on a course of PO vancomycin for 10 days for the C-diff. States that he has had diarrhea since before the course of abx. Pain has been increased for the past few weeks and is characterized as a cramping feeling. Daughters also note that there is increased distension. Also notes chest pain characterized as a cramping in the central chest. 83% O2 sat noted morning of admission on 7/6/18, at the assisted living facility. Takes O2 routinely at night but not during the day.    7/28/18 - Patient seen and examined at bedside. Patient for HD today, Creatinine increasing. Patient breathing on NC, without SOB, still admits some RLQ pain with guarding/tenderness. 3BM's overnight, brown and loose but form is improving. Will follow up GI about increasing Reglan, and Nephrology with HD tolerance.    ROS negative unless stated above.    Vital Signs Last 24 Hrs  T(C): 36.3 (28 Jul 2018 05:27), Max: 36.8 (27 Jul 2018 17:19)  T(F): 97.3 (28 Jul 2018 05:27), Max: 98.2 (27 Jul 2018 17:19)  HR: 80 (28 Jul 2018 09:45) (71 - 80)  BP: 134/34 (28 Jul 2018 09:45) (117/22 - 151/44)  BP(mean): --  RR: 18 (28 Jul 2018 05:27) (18 - 18)  SpO2: 96% (28 Jul 2018 05:27) (93% - 96%)    PHYSICAL EXAM:  Constitutional: NAD  HEENT: PERR, EOMI  Neck: Soft and supple, No LAD,   Respiratory: Breath sounds are clear bilaterally, No wheezing, rales or rhonchi, +suppO2 with NC @ 4LO2  Cardiovascular: S1 and S2, regular rate and rhythm, no Murmurs, gallops or rubs  Gastrointestinal: Bowel Sounds present, soft, tender to palpation RUQ and RLQ +, Distended+, no rigidity, no rebound  Extremities: LLE lesion with dressings c/d/i, RLE s/p AKA +  Vascular: 2+ peripheral pulses  Neurological: A/O x 3, no focal deficits  Musculoskeletal: unable to assess  Skin: Incontinence associated Dermatitis     MEDICATIONS  (STANDING):  ALBUTerol/ipratropium for Nebulization. 3 milliLiter(s) Nebulizer once  allopurinol 100 milliGRAM(s) Oral daily  aspirin  chewable 81 milliGRAM(s) Oral daily  buDESOnide   0.5 milliGRAM(s) Respule 0.5 milliGRAM(s) Inhalation two times a day  cholestyramine Powder (Sugar-Free) 4 Gram(s) Oral two times a day  diltiazem    Tablet 30 milliGRAM(s) Oral every 6 hours  doxazosin 8 milliGRAM(s) Oral at bedtime  epoetin nelly Injectable 3000 Unit(s) IV Push <User Schedule>  epoetin nelly Injectable 4000 Unit(s) IV Push <User Schedule>  finasteride 5 milliGRAM(s) Oral daily  heparin  Injectable 5000 Unit(s) SubCutaneous every 12 hours  metoclopramide 5 milliGRAM(s) Oral three times a day  metroNIDAZOLE  IVPB 250 milliGRAM(s) IV Intermittent every 8 hours  pantoprazole    Tablet 40 milliGRAM(s) Oral every 12 hours  simvastatin 10 milliGRAM(s) Oral at bedtime  sodium ferric gluconate complex Injectable 125 milliGRAM(s) IV Push <User Schedule>  vancomycin    Solution 500 milliGRAM(s) Oral every 6 hours    MEDICATIONS  (PRN):  acetaminophen   Tablet 650 milliGRAM(s) Oral every 8 hours PRN For Temp greater than 38 C (100.4 F)  acetaminophen   Tablet. 650 milliGRAM(s) Oral every 8 hours PRN Mild Pain (1 - 3)  albumin human 25% IVPB 50 milliLiter(s) IV Intermittent <User Schedule> PRN sbp<=120mmhg  ALBUTerol   0.5% 2.5 milliGRAM(s) Nebulizer every 4 hours PRN Shortness of Breath and/or Wheezing  diphenhydrAMINE   Capsule 25 milliGRAM(s) Oral at bedtime PRN sleep  HYDROmorphone  Injectable 0.5 milliGRAM(s) IV Push every 6 hours PRN Severe Pain (7 - 10)  ondansetron Injectable 4 milliGRAM(s) IV Push every 6 hours PRN Nausea and/or Vomiting                          8.2    4.60  )-----------( 201      ( 28 Jul 2018 07:24 )             26.7   07-28    138  |  109<H>  |  52<H>  ----------------------------<  68<L>  4.4   |  19<L>  |  5.06<H>    Ca    7.7<L>      28 Jul 2018 07:24  Phos  4.3     07-28  Mg     1.9     07-28    TPro  x   /  Alb  2.3<L>  /  TBili  x   /  DBili  x   /  AST  x   /  ALT  x   /  AlkPhos  x   07-28      Blood Culture:   Culture - Urine (07.10.18 @ 16:30)    Specimen Source: .Urine Catheterized    Culture Results:   <10,000 CFU/ml  Normal Urogenital ravinder present    Culture - Blood (07.06.18 @ 13:42)    Specimen Source: .Blood Blood-Peripheral    Culture Results:   No growth at 5 days.          RADIOLOGY/EKG:    < from: CT Abdomen and Pelvis No Cont (07.21.18 @ 11:15) >  IMPRESSION:     Stable appearance of pancolitis consistent with pseudomembranous colitis.   No pneumatosis or free air.    Increasing moderate ascites.    Anasarca.    Urinary bladder cystitis. Correlate with UA.    < end of copied text >      < from: Xray Chest 1 View- PORTABLE-Urgent (07.16.18 @ 18:34) >  IMPRESSION:    Findings suggesting persistent mild CHF with mild pulmonary vascular   congestion and trace fluid in the right minor fissure.    Stable right CVC.    < end of copied text >    < from: Transthoracic Echocardiogram (07.10.18 @ 10:21) >   Impression     Summary     Fibrocalcific changes noted to the mitral valve leaflets with preserved   leaflet excursion.   Moderate (2+) mitral regurgitation is present.   The left atrium is moderately dilated.   Mild concentric left ventricular hypertrophy is present.   Estimated left ventricular ejection fraction is 55-60 %.    < end of copied text >    < from: CT Abdomen and Pelvis No Cont (07.21.18 @ 11:15) >  IMPRESSION:     Stable appearance of pancolitis consistent with pseudomembranous colitis.   No pneumatosis or free air.    Increasing moderate ascites.    < end of copied text >    FINDINGS:  1. Ascites on preprocedure ultrasound.  2. Access of the peritoneal cavity under direct ultrasound guidance with   a 5 French Yueh needle   3. 2800 cc of clear yellow fluid aspirated  4. Resolution of ascites on postprocedure ultrasound    IMPRESSION:  Successful ultrasound-guided paracentesis    PLAN:   1. Return to interventional radiology for repeat paracentesis as needed.  2. Follow laboratory studies as ordered    Peritoneal fluid analysis  PMN seen    Cytopathology of Ascitic fluid  negative for malignant cells, acute and chronic nflammationary cells present     Serum   Albumin= 2.3  Protein= 4.7   LDH= 160       Ascitis Fluid  LDH= 83, Protein= 2.8, Albumin= 1.7     DVT PPX: NO AC due to severe GI bleed in past-- on Heparin subQ 5000U q12h

## 2018-07-28 NOTE — PROGRESS NOTE ADULT - PROBLEM SELECTOR PLAN 2
- Resolved  - continue to maintain fluid balance with HD MWF   - control HFpEF with medical management   - O2 as needed  - Pulm appreciated, add nebs  - Incentive Spirometry  - CXR: improvement of Pulmonary Vascular Congestion  - CT Abdomen: Moderate Ascites - contributing to desaturation;   - S/P Paracentesis 7/23/18 2800 cc fluid removed, improved respiratory status   - 3L NC O2 at night and on exertion as needed  - Hourizadeh appreciated, no longer needs BIPAP. - continue to maintain fluid balance with HD MWF   - control HFpEF with medical management   - O2 as needed  - Pulm appreciated, add nebs  - Incentive Spirometry  - CXR: improvement of Pulmonary Vascular Congestion  - CT Abdomen: Moderate Ascites - contributing to desaturation;   - S/P Paracentesis 7/23/18 2800 cc fluid removed, improved respiratory status   - 4L NC O2 at night and on exertion as needed  - Hourizadeh appreciated, no longer needs BIPAP.

## 2018-07-29 LAB
ALBUMIN SERPL ELPH-MCNC: 2.2 G/DL — LOW (ref 3.3–5)
ANION GAP SERPL CALC-SCNC: 7 MMOL/L — SIGNIFICANT CHANGE UP (ref 5–17)
BUN SERPL-MCNC: 30 MG/DL — HIGH (ref 7–23)
CALCIUM SERPL-MCNC: 7.7 MG/DL — LOW (ref 8.5–10.1)
CHLORIDE SERPL-SCNC: 108 MMOL/L — SIGNIFICANT CHANGE UP (ref 96–108)
CO2 SERPL-SCNC: 25 MMOL/L — SIGNIFICANT CHANGE UP (ref 22–31)
CREAT SERPL-MCNC: 3.57 MG/DL — HIGH (ref 0.5–1.3)
GLUCOSE SERPL-MCNC: 87 MG/DL — SIGNIFICANT CHANGE UP (ref 70–99)
HCT VFR BLD CALC: 25.9 % — LOW (ref 39–50)
HGB BLD-MCNC: 7.9 G/DL — LOW (ref 13–17)
MAGNESIUM SERPL-MCNC: 2 MG/DL — SIGNIFICANT CHANGE UP (ref 1.6–2.6)
MCHC RBC-ENTMCNC: 29.9 PG — SIGNIFICANT CHANGE UP (ref 27–34)
MCHC RBC-ENTMCNC: 30.5 GM/DL — LOW (ref 32–36)
MCV RBC AUTO: 98.1 FL — SIGNIFICANT CHANGE UP (ref 80–100)
NRBC # BLD: 0 /100 WBCS — SIGNIFICANT CHANGE UP (ref 0–0)
PHOSPHATE SERPL-MCNC: 2.9 MG/DL — SIGNIFICANT CHANGE UP (ref 2.5–4.5)
PLATELET # BLD AUTO: 186 K/UL — SIGNIFICANT CHANGE UP (ref 150–400)
POTASSIUM SERPL-MCNC: 3.6 MMOL/L — SIGNIFICANT CHANGE UP (ref 3.5–5.3)
POTASSIUM SERPL-SCNC: 3.6 MMOL/L — SIGNIFICANT CHANGE UP (ref 3.5–5.3)
RBC # BLD: 2.64 M/UL — LOW (ref 4.2–5.8)
RBC # FLD: 19.8 % — HIGH (ref 10.3–14.5)
SODIUM SERPL-SCNC: 140 MMOL/L — SIGNIFICANT CHANGE UP (ref 135–145)
WBC # BLD: 4.76 K/UL — SIGNIFICANT CHANGE UP (ref 3.8–10.5)
WBC # FLD AUTO: 4.76 K/UL — SIGNIFICANT CHANGE UP (ref 3.8–10.5)

## 2018-07-29 RX ADMIN — HYDROMORPHONE HYDROCHLORIDE 0.5 MILLIGRAM(S): 2 INJECTION INTRAMUSCULAR; INTRAVENOUS; SUBCUTANEOUS at 12:15

## 2018-07-29 RX ADMIN — HEPARIN SODIUM 5000 UNIT(S): 5000 INJECTION INTRAVENOUS; SUBCUTANEOUS at 17:50

## 2018-07-29 RX ADMIN — FINASTERIDE 5 MILLIGRAM(S): 5 TABLET, FILM COATED ORAL at 12:01

## 2018-07-29 RX ADMIN — Medication 500 MILLIGRAM(S): at 06:30

## 2018-07-29 RX ADMIN — Medication 500 MILLIGRAM(S): at 17:50

## 2018-07-29 RX ADMIN — Medication 50 MILLIGRAM(S): at 14:33

## 2018-07-29 RX ADMIN — Medication 0.5 MILLIGRAM(S): at 20:06

## 2018-07-29 RX ADMIN — Medication 5 MILLIGRAM(S): at 06:30

## 2018-07-29 RX ADMIN — CHOLESTYRAMINE 4 GRAM(S): 4 POWDER, FOR SUSPENSION ORAL at 09:11

## 2018-07-29 RX ADMIN — Medication 500 MILLIGRAM(S): at 12:02

## 2018-07-29 RX ADMIN — PANTOPRAZOLE SODIUM 40 MILLIGRAM(S): 20 TABLET, DELAYED RELEASE ORAL at 17:50

## 2018-07-29 RX ADMIN — HEPARIN SODIUM 5000 UNIT(S): 5000 INJECTION INTRAVENOUS; SUBCUTANEOUS at 06:31

## 2018-07-29 RX ADMIN — Medication 50 MILLIGRAM(S): at 06:30

## 2018-07-29 RX ADMIN — HYDROMORPHONE HYDROCHLORIDE 0.5 MILLIGRAM(S): 2 INJECTION INTRAMUSCULAR; INTRAVENOUS; SUBCUTANEOUS at 12:00

## 2018-07-29 RX ADMIN — Medication 81 MILLIGRAM(S): at 12:01

## 2018-07-29 RX ADMIN — PANTOPRAZOLE SODIUM 40 MILLIGRAM(S): 20 TABLET, DELAYED RELEASE ORAL at 06:31

## 2018-07-29 RX ADMIN — Medication 100 MILLIGRAM(S): at 12:01

## 2018-07-29 RX ADMIN — SIMVASTATIN 10 MILLIGRAM(S): 20 TABLET, FILM COATED ORAL at 21:46

## 2018-07-29 RX ADMIN — Medication 8 MILLIGRAM(S): at 21:47

## 2018-07-29 RX ADMIN — Medication 500 MILLIGRAM(S): at 23:13

## 2018-07-29 RX ADMIN — Medication 5 MILLIGRAM(S): at 12:01

## 2018-07-29 RX ADMIN — Medication 5 MILLIGRAM(S): at 21:47

## 2018-07-29 RX ADMIN — Medication 0.5 MILLIGRAM(S): at 08:27

## 2018-07-29 RX ADMIN — Medication 50 MILLIGRAM(S): at 21:48

## 2018-07-29 RX ADMIN — Medication 500 MILLIGRAM(S): at 00:46

## 2018-07-29 NOTE — PROGRESS NOTE ADULT - SUBJECTIVE AND OBJECTIVE BOX
NEPHROLOGY INTERVAL HPI/OVERNIGHT EVENTS:   SY  Appearing comfortable.  Though continued abdominal discomfort, able to eat better with improved diarrhea.     SY  Resting comfortably today.  No new complaints.  Reports improved Bms.  Still mild continued abdominal discomfort.       no c/o  11 episodes of diarrhea ( ?related to reglan)  2+ incont UOP in diaper  toelrating po   IV flagyll added and on higher dose of po vanc  ID/GI input noted_ reglan dec    7/26 SY  Not feeling well this am.  Complains of lower abdominal pain again.  Does report increased urine output.     SY  No acute events.  Again reports increased urine output.--Not recorded.  Complains of continued diarrhea.  MEDICATIONS  (STANDING):  ALBUTerol/ipratropium for Nebulization. 3 milliLiter(s) Nebulizer once  allopurinol 100 milliGRAM(s) Oral daily  aspirin  chewable 81 milliGRAM(s) Oral daily  buDESOnide   0.5 milliGRAM(s) Respule 0.5 milliGRAM(s) Inhalation two times a day  cholestyramine Powder (Sugar-Free) 4 Gram(s) Oral two times a day  diltiazem    Tablet 30 milliGRAM(s) Oral every 6 hours  doxazosin 8 milliGRAM(s) Oral at bedtime  epoetin nelly Injectable 3000 Unit(s) IV Push <User Schedule>  epoetin nelly Injectable 4000 Unit(s) IV Push <User Schedule>  finasteride 5 milliGRAM(s) Oral daily  heparin  Injectable 5000 Unit(s) SubCutaneous every 12 hours  metoclopramide 5 milliGRAM(s) Oral three times a day  metroNIDAZOLE  IVPB 250 milliGRAM(s) IV Intermittent every 8 hours  pantoprazole    Tablet 40 milliGRAM(s) Oral every 12 hours  simvastatin 10 milliGRAM(s) Oral at bedtime  sodium ferric gluconate complex Injectable 125 milliGRAM(s) IV Push <User Schedule>  vancomycin    Solution 500 milliGRAM(s) Oral every 6 hours    MEDICATIONS  (PRN):  acetaminophen   Tablet 650 milliGRAM(s) Oral every 8 hours PRN For Temp greater than 38 C (100.4 F)  acetaminophen   Tablet. 650 milliGRAM(s) Oral every 8 hours PRN Mild Pain (1 - 3)  albumin human 25% IVPB 50 milliLiter(s) IV Intermittent <User Schedule> PRN sbp<=120mmhg  ALBUTerol   0.5% 2.5 milliGRAM(s) Nebulizer every 4 hours PRN Shortness of Breath and/or Wheezing  diphenhydrAMINE   Capsule 25 milliGRAM(s) Oral at bedtime PRN sleep  HYDROmorphone  Injectable 0.5 milliGRAM(s) IV Push every 6 hours PRN Severe Pain (7 - 10)  ondansetron Injectable 4 milliGRAM(s) IV Push every 6 hours PRN Nausea and/or Vomiting          Vital Signs Last 24 Hrs  T(C): 36.6 (2018 11:23), Max: 36.9 (2018 17:56)  T(F): 97.8 (2018 11:23), Max: 98.5 (2018 17:56)  HR: 72 (2018 11:23) (67 - 95)  BP: 123/35 (2018 11:23) (123/35 - 137/41)  BP(mean): --  RR: 18 (2018 11:23) (17 - 18)  SpO2: 95% (2018 11:23) (92% - 96%)  Daily     Daily Weight in k.2 (2018 10:47)     @ : @ 07:00  --------------------------------------------------------  IN: 150 mL / OUT: 0 mL / NET: 150 mL     @ : @ 16:22  --------------------------------------------------------  IN: 0 mL / OUT: 75 mL / NET: -75 mL        PHYSICAL EXAM:  Alert and appropriate  GENERAL: No distress  CHEST/LUNG: Clear to aus  HEART: S1S2 RRR  ABDOMEN: distended.  EXTREMITIES: much improved edema  SKIN:     LABS:                        7.9    4.76  )-----------( 186      ( 2018 07:23 )             25.9         140  |  108  |  30<H>  ----------------------------<  87  3.6   |  25  |  3.57<H>    Ca    7.7<L>      2018 07:23  Phos  2.9       Mg     2.0         TPro  x   /  Alb  2.2<L>  /  TBili  x   /  DBili  x   /  AST  x   /  ALT  x   /  AlkPhos  x           Phosphorus Level, Serum: 2.9 mg/dL ( @ 07:23)  Magnesium, Serum: 2.0 mg/dL ( @ 07:23)          RADIOLOGY & ADDITIONAL TESTS:

## 2018-07-29 NOTE — PROGRESS NOTE ADULT - PROBLEM SELECTOR PLAN 1
- vancomycin 500 mg q6H -Will eventually taper to 3 times a day for 2 weeks, then twice a day for one week, then daily for one week, then every other day for 1 week, then every 3rd day for 1 week. Recommend follow-up with Dr. Grullon after d/c  - s/p Flagyl 10 days- restarted flagyl  mg q8h on 7/27  - ID appreciated, continue PO vancomycin  - GI appreciated, taper regimen structured  -Paracentesis 7/23/18- 2800 cc removed, cytopathology report-no malig, inflammatory cells, PMNs seen on analysis of fluid  -SAAG= .6 (<1.1), therefore not pointing towards HF, or portal hypertension--DDx:  serositis

## 2018-07-29 NOTE — PROGRESS NOTE ADULT - SUBJECTIVE AND OBJECTIVE BOX
84 y/o M w/pmhx of Afib (CHADVASC 3, not on AC, GI bleed), CHF, CAD s/p stents, COPD, recent PNA on abx, upper GI bleed (duodenal)  BIB EMS from [facility] assisted [facility] for fever, abd pain, and diarrhea that began 3 weeks ago. Was in the hospital for PNA tx and was later dx with C-diff and started on a course of PO vancomycin for 10 days for the C-diff. States that he has had diarrhea since before the course of abx. Pain has been increased for the past few weeks and is characterized as a cramping feeling. Daughters also note that there is increased distension. Also notes chest pain characterized as a cramping in the central chest. 83% O2 sat noted morning of admission on 7/6/18, at the assisted living facility. Takes O2 routinely at night but not during the day.    7/25: seen and eval. patient very deconditioned and frail. Currently getting HD. Paracentesis reviewed. No overnight fevers, chills or shakes. Labs and vitals reviewed. Patient denies any HA, CP, SOB.     7/26: Patient seen and eval. hemodynamically stable. very frail. complaining of (R) sided abdominal pain 6/10, comfortable with positioning. No fevers, chills. Care discussed with Dr. Palacio. Care discussed with the family at the bedside. Patient denies any HA, CP, SOB.     7/29: Seen and eval. patient is very deconditioned, frail with multiple comorbidities     ROS:  still with mild abdominal pain, 3 episodes of diarrhea today.     PHYSICAL EXAM:  T(C): 36.3 (28 Jul 2018 05:27), Max: 36.8 (27 Jul 2018 17:19)  T(F): 97.3 (28 Jul 2018 05:27), Max: 98.2 (27 Jul 2018 17:19)  HR: 72 (28 Jul 2018 05:27) (71 - 79)  BP: 151/44 (28 Jul 2018 05:27) (117/22 - 151/44)  RR: 18 (28 Jul 2018 05:27) (18 - 18)  SpO2: 96% (28 Jul 2018 05:27) (93% - 96%)  Constitutional: very deconditioned, frail, sick appearing  HEENT: PERR, EOMI, Normal Hearing, MMM  Neck: no JVD  Respiratory: Breath sounds are clear bilaterally, No wheezing, rales or rhonchi  Cardiovascular: S1 and S2, regular rate and rhythm, no Murmurs, gallops or rubs  Gastrointestinal:  Distended+, no rigidity, no rebound  Extremities: LLE lesion with dressings c/d/i, RLE s/p AKA +  Vascular: 2+ peripheral pulses  Neurological: A/O x 3, no focal deficits  Musculoskeletal: unable to assess  Skin: Incontinence associated Dermatitis with Rectal tube+ output green/loose stool    LABS:     CBC Full  -  ( 29 Jul 2018 07:23 )  WBC Count : 4.76 K/uL  Hemoglobin : 7.9 g/dL  Hematocrit : 25.9 %  Platelet Count - Automated : 186 K/uL    140  |  108  |  30<H>  ----------------------------<  87  3.6   |  25  |  3.57<H>    Ca    7.7<L>      29 Jul 2018 07:23  Phos  2.9     07-29  Mg     2.0     07-29    TPro  x   /  Alb  2.2<L>  /  TBili  x   /  DBili  x   /  AST  x   /  ALT  x   /  AlkPhos  x   07-29      # Pseudomembranous colitis  - Due to severe C. difficile, patient is anticipated to be on a prolonged antibiotic taper  - Vancomycin 500 mg po q 6 hours  - Restart flagyl 250 mg iv q 8 hours, lower dose given dialysis  - Then 3 times a day for 2 weeks, then twice a day for one week, then daily for one week, then every other day for 1 week, then every 3rd day for 1 week.  - isolation      # Acute respiratory failure with hypoxia   - continue to maintain fluid balance with HD MWF   - control HFpEF with medical management   - O2 as needed  - Pulm appreciated, add nebs  - Incentive Spirometry  - CXR: improvement of Pulmonary Vascular Congestion  - CT Abdomen: Moderate Ascites - contributing to desaturation;   - 3L NC O2 at night and on exertion as needed.     # Renal insufficiency  - s/p Tunnel Cath on 7/16/18, patient currently HD dependent  - patient currently on HD.     # Afib   - Currently sinus  - No AC due to hx of Severe GI bleed  - BP does not tolerate Cardizem or Beta Blocker.     # Mobitz type 2 second degree AV block  - non recurrent, now in sinus rhythm RRR  - EP consult appreciated: likely vagal, may be 2/2 SHAYY. 84 y/o M w/pmhx of Afib (CHADVASC 3, not on AC, GI bleed), CHF, CAD s/p stents, COPD, recent PNA on abx, upper GI bleed (duodenal)  BIB EMS from [facility] assisted [facility] for fever, abd pain, and diarrhea that began 3 weeks ago. Was in the hospital for PNA tx and was later dx with C-diff and started on a course of PO vancomycin for 10 days for the C-diff. States that he has had diarrhea since before the course of abx. Pain has been increased for the past few weeks and is characterized as a cramping feeling. Daughters also note that there is increased distension. Also notes chest pain characterized as a cramping in the central chest. 83% O2 sat noted morning of admission on 7/6/18, at the assisted living facility. Takes O2 routinely at night but not during the day.    7/25: seen and eval. patient very deconditioned and frail. Currently getting HD. Paracentesis reviewed. No overnight fevers, chills or shakes. Labs and vitals reviewed. Patient denies any HA, CP, SOB.     7/26: Patient seen and eval. hemodynamically stable. very frail. complaining of (R) sided abdominal pain 6/10, comfortable with positioning. No fevers, chills. Care discussed with Dr. Palacio. Care discussed with the family at the bedside. Patient denies any HA, CP, SOB.     7/29: Seen and eval. patient is very deconditioned, frail with multiple comorbidities. Patient had 4 episodes of loose stools. Labs and vitals reviewed. patient clinically appears to look better.     ROS:  still with mild abdominal pain, 3 episodes of diarrhea today.     PHYSICAL EXAM:  T(C): 36.3 (28 Jul 2018 05:27), Max: 36.8 (27 Jul 2018 17:19)  T(F): 97.3 (28 Jul 2018 05:27), Max: 98.2 (27 Jul 2018 17:19)  HR: 72 (28 Jul 2018 05:27) (71 - 79)  BP: 151/44 (28 Jul 2018 05:27) (117/22 - 151/44)  RR: 18 (28 Jul 2018 05:27) (18 - 18)  SpO2: 96% (28 Jul 2018 05:27) (93% - 96%)  Constitutional: very deconditioned, frail, sick appearing  HEENT: PERR, EOMI, Normal Hearing, MMM  Neck: no JVD  Respiratory: Breath sounds are clear bilaterally, No wheezing, rales or rhonchi  Cardiovascular: S1 and S2, regular rate and rhythm, no Murmurs, gallops or rubs  Gastrointestinal:  Distended+, no rigidity, no rebound  Extremities: LLE lesion with dressings c/d/i, RLE s/p AKA +  Vascular: 2+ peripheral pulses  Neurological: A/O x 3, no focal deficits  Musculoskeletal: unable to assess  Skin: Incontinence associated Dermatitis with Rectal tube+ output green/loose stool    LABS:     CBC Full  -  ( 29 Jul 2018 07:23 )  WBC Count : 4.76 K/uL  Hemoglobin : 7.9 g/dL  Hematocrit : 25.9 %  Platelet Count - Automated : 186 K/uL    140  |  108  |  30<H>  ----------------------------<  87  3.6   |  25  |  3.57<H>    Ca    7.7<L>      29 Jul 2018 07:23  Phos  2.9     07-29  Mg     2.0     07-29    TPro  x   /  Alb  2.2<L>  /  TBili  x   /  DBili  x   /  AST  x   /  ALT  x   /  AlkPhos  x   07-29      # Pseudomembranous colitis  - Due to severe C. difficile, patient is anticipated to be on a prolonged antibiotic taper  - Vancomycin 500 mg po q 6 hours  - Restart flagyl 250 mg iv q 8 hours, lower dose given dialysis  - Then 3 times a day for 2 weeks, then twice a day for one week, then daily for one week, then every other day for 1 week, then every 3rd day for 1 week.  - isolation      # Acute respiratory failure with hypoxia   - continue to maintain fluid balance with HD MWF   - control HFpEF with medical management   - O2 as needed  - Pulm appreciated, add nebs  - Incentive Spirometry  - CXR: improvement of Pulmonary Vascular Congestion  - CT Abdomen: Moderate Ascites - contributing to desaturation;   - 3L NC O2 at night and on exertion as needed.     # Renal insufficiency  - s/p Tunnel Cath on 7/16/18, patient currently HD dependent  - patient currently on HD.     # Afib   - Currently sinus  - No AC due to hx of Severe GI bleed  - BP does not tolerate Cardizem or Beta Blocker.     # Mobitz type 2 second degree AV block  - non recurrent, now in sinus rhythm RRR  - EP consult appreciated: likely vagal, may be 2/2 SHAYY.

## 2018-07-29 NOTE — PROGRESS NOTE ADULT - ASSESSMENT
INTERVAL/OVERNIGHT EVENTS: This is a 82 y/o M w/pmhx of Afib, CHF, CAD s/p stents, COPD, recent PNA on abx, upper GI bleed (duodenal)  BIB EMS from [facility] for fever, abd pain, and diarrhea that began 3 weeks ago. Was in the hospital for PNA tx and was later dx with C-diff and started on a course of PO vancomycin for 10 days for the C-diff. States that he has had diarrhea since before the course of abx. Pain has been increased for the past few weeks and is characterized as a cramping feeling. Daughters also note that there is increased distension. Also notes chest pain characterized as a cramping in the central chest. 83% O2 sat noted morning of admission on 7/6/18, at the assisted living facility. Takes O2 routinely at night but not during the day.    7/29/2018: Patient feeling better. Less abdominal pain but still minimal tenderness. Still with diarrhea however less watery. Denies any additional acute complaints.

## 2018-07-29 NOTE — PROGRESS NOTE ADULT - SUBJECTIVE AND OBJECTIVE BOX
INTERVAL/OVERNIGHT EVENTS: This is a 82 y/o M w/pmhx of Afib, CHF, CAD s/p stents, COPD, recent PNA on abx, upper GI bleed (duodenal)  BIB EMS from [facility] for fever, abd pain, and diarrhea that began 3 weeks ago. Was in the hospital for PNA tx and was later dx with C-diff and started on a course of PO vancomycin for 10 days for the C-diff. States that he has had diarrhea since before the course of abx. Pain has been increased for the past few weeks and is characterized as a cramping feeling. Daughters also note that there is increased distension. Also notes chest pain characterized as a cramping in the central chest. 83% O2 sat noted morning of admission on 7/6/18, at the assisted living facility. Takes O2 routinely at night but not during the day.    7/29/2018: Patient feeling better. Less abdominal pain but still minimal tenderness. Still with diarrhea however less watery. Denies any additional acute complaints.    MEDICATIONS  (STANDING):  ALBUTerol/ipratropium for Nebulization. 3 milliLiter(s) Nebulizer once  allopurinol 100 milliGRAM(s) Oral daily  aspirin  chewable 81 milliGRAM(s) Oral daily  buDESOnide   0.5 milliGRAM(s) Respule 0.5 milliGRAM(s) Inhalation two times a day  cholestyramine Powder (Sugar-Free) 4 Gram(s) Oral two times a day  diltiazem    Tablet 30 milliGRAM(s) Oral every 6 hours  doxazosin 8 milliGRAM(s) Oral at bedtime  epoetin nelly Injectable 3000 Unit(s) IV Push <User Schedule>  epoetin nelly Injectable 4000 Unit(s) IV Push <User Schedule>  finasteride 5 milliGRAM(s) Oral daily  heparin  Injectable 5000 Unit(s) SubCutaneous every 12 hours  metoclopramide 5 milliGRAM(s) Oral three times a day  metroNIDAZOLE  IVPB 250 milliGRAM(s) IV Intermittent every 8 hours  pantoprazole    Tablet 40 milliGRAM(s) Oral every 12 hours  simvastatin 10 milliGRAM(s) Oral at bedtime  sodium ferric gluconate complex Injectable 125 milliGRAM(s) IV Push <User Schedule>  vancomycin    Solution 500 milliGRAM(s) Oral every 6 hours    MEDICATIONS  (PRN):  acetaminophen   Tablet 650 milliGRAM(s) Oral every 8 hours PRN For Temp greater than 38 C (100.4 F)  acetaminophen   Tablet. 650 milliGRAM(s) Oral every 8 hours PRN Mild Pain (1 - 3)  albumin human 25% IVPB 50 milliLiter(s) IV Intermittent <User Schedule> PRN sbp<=120mmhg  ALBUTerol   0.5% 2.5 milliGRAM(s) Nebulizer every 4 hours PRN Shortness of Breath and/or Wheezing  diphenhydrAMINE   Capsule 25 milliGRAM(s) Oral at bedtime PRN sleep  HYDROmorphone  Injectable 0.5 milliGRAM(s) IV Push every 6 hours PRN Severe Pain (7 - 10)  ondansetron Injectable 4 milliGRAM(s) IV Push every 6 hours PRN Nausea and/or Vomiting    Allergies    Zosyn (Rash)    REVIEW OF SYSTEMS:    CONSTITUTIONAL: No weakness, fevers or chills  EYES/ENT: No visual changes;  No vertigo or throat pain   NECK: No pain or stiffness  RESPIRATORY: No cough, wheezing, hemoptysis; No shortness of breath  CARDIOVASCULAR: No chest pain or palpitations  GASTROINTESTINAL: +abdominal pain, no epigastric pain. No nausea, vomiting, or hematemesis; No diarrhea or constipation. No melena or hematochezia.  GENITOURINARY: No dysuria, frequency or hematuria  NEUROLOGICAL: No numbness or weakness  SKIN: No itching, burning, rashes, or lesions   All other review of systems is negative unless indicated above.    Vital Signs Last 24 Hrs  T(C): 36.4 (29 Jul 2018 17:00), Max: 36.7 (29 Jul 2018 05:00)  T(F): 97.6 (29 Jul 2018 17:00), Max: 98 (29 Jul 2018 05:00)  HR: 75 (29 Jul 2018 17:00) (67 - 78)  BP: 145/43 (29 Jul 2018 17:00) (123/35 - 145/43)  BP(mean): --  RR: 16 (29 Jul 2018 17:00) (16 - 18)  SpO2: 94% (29 Jul 2018 17:00) (93% - 96%)  I&O's Summary    VS reviewed, stable.  Gen: patient is frail appearing, but in no acute distress  HEENT: NC/AT  Neck: FROM, supple, no cervical LAD  Chest: CTA b/l, no crackles/wheezes, good air entry, no tachypnea or retractions  CV: regular rate and rhythm, no murmurs   Abd: soft, mild RLQ tenderness, mild distention, no HSM appreciated, +BS  Back: no vertebral or paraspinal tenderness along entire spine; no CVAT  Extrem: 2+ peripheral pulses, WWP.   Neuro: non-focal    Interval Lab Results:                        7.9    4.76  )-----------( 186      ( 29 Jul 2018 07:23 )             25.9                         8.2    4.60  )-----------( 201      ( 28 Jul 2018 07:24 )             26.7                         8.0    4.28  )-----------( 188      ( 27 Jul 2018 07:00 )             26.8                               140    |  108    |  30                  Calcium: 7.7   / iCa: x      (07-29 @ 07:23)    ----------------------------<  87        Magnesium: 2.0                              3.6     |  25     |  3.57             Phosphorous: 2.9      TPro  x      /  Alb  2.2    /  TBili  x      /  DBili  x      /  AST  x      /  ALT  x      /  AlkPhos  x      29 Jul 2018 07:23

## 2018-07-29 NOTE — PROGRESS NOTE ADULT - PROBLEM SELECTOR PLAN 2
- continue to maintain fluid balance with HD MWF   - control HFpEF with medical management   - O2 as needed  - Pulm appreciated, add nebs  - Incentive Spirometry  - CT Abdomen: Moderate Ascites - contributing to desaturation;   - S/P Paracentesis 7/23/18 2800 cc fluid removed, improved respiratory status   - 4L NC O2 at night and on exertion as needed  - Hourizadeh appreciated, no longer needs BIPAP.

## 2018-07-29 NOTE — PROGRESS NOTE ADULT - PROBLEM SELECTOR PLAN 3
- s/p Tunnel Cath on 7/16/18, patient currently HD dependent  - Nephro consult appreciated: cont HD, Creatinine worsened past couple days  -HD today, no UF, no fluid restrictions

## 2018-07-29 NOTE — PROGRESS NOTE ADULT - ASSESSMENT
84 y/o wm with hx of ckd stage 3 ( scr 1.5-1.7) with ARYA due to volume depletion from CDiff associated colitis and diarrhea in presence of diuretic use PTA.  Now with non anion gap metabolic acidosis and worsening renal parameters.  CXR with mild CHF with hx of diastolic CHF.    PLAN  - obtain abd and lactate  - will change ivf and add bicarbonate and keep at current rate  - hold lasix done.   - fu uop and scr closely and will monitor respiratory status  - bp and hr stable now, cardizem adjusted and lopressor added    7/9 MK  - ARYA: worsening renal parameters with metabolic acidosis, non ag.  Previous lactate WNL and since placed on bicarbonate drip  fu repeat abg today.  Increase IVF rate  possibility of HD discussed with enio, hcp daughter, pt has been agreeable in past.   DC hydralazine  dc morphine and change to dilaudid  - Cdiff with rising scr and worsening abd distension: CRS consult ongoing  possible repeat ct scan  DW Dr ballesteros    7/10  Anuric  anasarca  Non anion gap acidosis on bicarb drip  ARYA, anuric  CKD 3 history  d/w Family, and pt agreeable  called ICU for line placement and to dialyze the pt in ICU for monitoring    7/10  HD initiated via R temp HD catheter  tolerating well  no complaints  good abf    7/11 SY  --ARYA with Anasarca.  Urine output slightly improved.  However, remains quite fluid overloaded.  Will plan to continue HD until fluid status is much improved.  HD order written.  3 hour tx today.  Will aim to remove 2.5 kg as tolerated.  --C DIff colitis ; continue to monitor closely.    7/12 SY  --ARYA with Anasarca.   Remains Oligo-anuric.    --HD with goal of 2.5 kg UF again today.  --C Diff colitis.   Clinically improved   Continue to monitor.    7/13  The patient was dialyzed 3 days in a row this week  Discussed with the patient's daughter and hospitalist, intensivist  Will skip dialysis today, no urgent need for dialysis  We'll dialyze the patient tomorrow on Saturday and then skip Sunday to dialyze the patient again on Monday.    7/14  We'll dialyze against 4 K bath  Case discussed with the patient's daughter  May need a permanent catheter by Monday      7/15  Dialyze with a 4K bath  Potassium is 3.3 most likely from the diarrhea  Schedule for permacath placement after dialysis on Monday or Tuesday  Patient is comfortable no complications noted at this time    7/16 MK  - ARYA/ CKD stage 3 remains oliguirc and HD dependent.  LImited UF at HD toleated with the   hypokalemia corrected with HD.  Permcath planned today  - Cdiff: on PO vanc.  improved leukocytosis and abd distension     7/17  s/p perm cath  HD tomorrow  4 K bath  replace K   overall stable    7/18 SY  --ARYA/CKD for now remaining dialysis dependent.  Continue TIW HD.  --C Diff colitis ; improving clinically.    7/19 SY  --ARYA/CKD : Continue TIW HD.   Will continue as outpatient for now as no signs of recovery at this point.  --C Diff colitis  : clinically improving.  Continue po abtx.  --D/c Planning  D/w pt's daughter re : rehab with HD.  --Replete K.  Continue regular diet without restrictions for now.    7/20 MK  - ARYA/CKD remains HD dependent with oliguria.  Will attempt UF with HD.  K correction with HD and changed to regular diet with fluid restriction  - Cdiff: PO vanc with improving renal paramenters-  - Episodes of Desat: will have pulmonary re-eval prior to dc.  - DC planning rehab with dialysis  dw Dr Hightower  will see pt    7/21 MK  - ARYA/CKD remains hd dependent. post hd cxr with improved PVC.  Still with episodes of desaturation and dr hightower input noted.  CT with evidence of ascites for paracentesis  - cdiff: on po vanc.  still with loose stools,     7/22 MK  - ARYA/CKD remains HD dependent.  Will coordinate AM HD based upon timing of paracentesis, hypokalemia improved  - hypoxia with ascites: for paracentesis   - cdif on po vanc, rectal tube in place    7/23 MK  - ARYA/CKD remains HD dependent, toelrating hd.  + inc uop   - hypoxia with ascites: s/p paracentesis today, monitor response to oxygenation  - AOCD; fu trend of h/h, on epogen  - cdiff: po vanc    7/24 SY  --ARYA/CKD  Urine output may be increasing.  Follow creat trend to determine further dialysis support.  --Post Paracentesis : improve resp status.  --Anemia : continue LETICIA.  --C Diff : continue po Vanco.    7/25 SY  --ARYA/CKD  Monitor urine output.  Noted with increased creat.  Will maintain on TIW HD since fluid overloaded state is persistent.  --Monitor GI function.  --Post Paracentesis : resp status stabilized.  --Anemia :continue LETICIA.    7/26 SY  --ARYA/CKD  : Creat slowly  trending down.  Urine output not recorded.  Check creat in am to determine whether HD can be held off.  --Recurrent abdominal pain of concern.  Being assessed by primary team and GI.  --Bladder scan to assure full void.    7/27 MK  - ARYA/CKD: with + inc Uop and significant volume depletion overnight.  Bladder scan of 77 ml with stable respiratory status.  NO Hd today and reassess in the AM.      7/28 SY  --ARYA /CKD : positive increase in urine output. However, renal parameters remain elevated.  Therefore, will maintain on TIW HD schedule for now.  --Pt's fluid status has much improved.   Will HD today with no UF and allow increased fluid intake without restrictions.  --C DIff : continue to monitor and continue po abtx.    7/29 SY  --ARYA/CKD   post HD yesterday.   Continue to monitor for signs of renal recovery.  --Fluid overloaded state much improved.  --Monitor on regular diet.  --C Diff : continue po abtx.

## 2018-07-29 NOTE — PROGRESS NOTE ADULT - ASSESSMENT
82yo male with multiple medical problems with cdiff  slow clinical improvement on flagyl/vanco  Dr. Hernández to resume care tomorrow

## 2018-07-29 NOTE — PROGRESS NOTE ADULT - ASSESSMENT
Pt is a 84 y/o M w/pmhx of Afib, CHF, CAD s/p stents, COPD, PNA, upper GI bleed (duodenal), PVD, s/p right lower extremity amputation,  recent hospitalization for pneumonia and subsequent Cdiff colitis admitted on 7/6 from assisted living for evaluation of persistent abdominal pain, fever and diarrhea that has been ongoing despite the po vancomycin course that the patient had been on. The patient also notes mid epigastric/chest pain. History per daughter at bedside and medical record as patient is poor historian.  1. Patient admitted with severe Cdiff pan colitis, also noted with leukocytosis most likely reactive to Cdiff infection  - follow up cultures   - iv hydration and supportive care   - serial cbc and monitor temperature   - back on vancomycin 500 mg po q 6 hours  - restarted flagyl 250 mg iv q 8 hours, lower dose given dialysis  - continue contact isolation  - limit antibiotics exposure  2. other issues: Afib, CHF, CAD s/p stents, COPD, PNA, upper GI bleed (duodenal), PVD, s/p right lower extremity amputation  - per medicine  3. Bladder wall thickening most likely due to the fact that patient now dialysis patient  - would avoid starting antibiotics in this patient with severe CDiff colitis  - monitor off antibiotics

## 2018-07-29 NOTE — PROGRESS NOTE ADULT - SUBJECTIVE AND OBJECTIVE BOX
Patient is a 83y old  Male who presents with a chief complaint of Fever/Diarrhea (20 Jul 2018 13:30)      HPI:  pt resting comfortably   awakens easily to answer questions  some abdominal discomfort and diarrhea    PAST MEDICAL & SURGICAL HISTORY:  Diastolic CHF  Peripheral vascular disease  Afib  Anemia  CKD (chronic kidney disease)  COPD (chronic obstructive pulmonary disease)  SHAYY (obstructive sleep apnea)  Sepsis, due to unspecified organism: 2/2 poorly healing wounds b/l  Dyspepsia: On moderate exertion.  Sleep apnea, obstructive: Requires home 02 therapy, and treatment with BIPAP  Atelectasis  Pleural effusion, bilateral  Respiratory failure  Peripheral edema  CRI (chronic renal insufficiency)  Gout  Benign prostatic hypertrophy  Spinal stenosis  Hypercholesterolemia  GERD (gastroesophageal reflux disease)  CAD (coronary artery disease)  Hypertension  S/P angioplasty with stent  Cataract of left eye  Prostate: Surgery green light procedure.  S/P rotator cuff surgery: Right  S/P angioplasty      MEDICATIONS  (STANDING):  ALBUTerol/ipratropium for Nebulization. 3 milliLiter(s) Nebulizer once  allopurinol 100 milliGRAM(s) Oral daily  aspirin  chewable 81 milliGRAM(s) Oral daily  buDESOnide   0.5 milliGRAM(s) Respule 0.5 milliGRAM(s) Inhalation two times a day  cholestyramine Powder (Sugar-Free) 4 Gram(s) Oral two times a day  diltiazem    Tablet 30 milliGRAM(s) Oral every 6 hours  doxazosin 8 milliGRAM(s) Oral at bedtime  epoetin nelly Injectable 3000 Unit(s) IV Push <User Schedule>  epoetin nelly Injectable 4000 Unit(s) IV Push <User Schedule>  finasteride 5 milliGRAM(s) Oral daily  heparin  Injectable 5000 Unit(s) SubCutaneous every 12 hours  metoclopramide 5 milliGRAM(s) Oral three times a day  metroNIDAZOLE  IVPB 250 milliGRAM(s) IV Intermittent every 8 hours  pantoprazole    Tablet 40 milliGRAM(s) Oral every 12 hours  simvastatin 10 milliGRAM(s) Oral at bedtime  sodium ferric gluconate complex Injectable 125 milliGRAM(s) IV Push <User Schedule>  vancomycin    Solution 500 milliGRAM(s) Oral every 6 hours    MEDICATIONS  (PRN):  acetaminophen   Tablet 650 milliGRAM(s) Oral every 8 hours PRN For Temp greater than 38 C (100.4 F)  acetaminophen   Tablet. 650 milliGRAM(s) Oral every 8 hours PRN Mild Pain (1 - 3)  albumin human 25% IVPB 50 milliLiter(s) IV Intermittent <User Schedule> PRN sbp<=120mmhg  ALBUTerol   0.5% 2.5 milliGRAM(s) Nebulizer every 4 hours PRN Shortness of Breath and/or Wheezing  diphenhydrAMINE   Capsule 25 milliGRAM(s) Oral at bedtime PRN sleep  HYDROmorphone  Injectable 0.5 milliGRAM(s) IV Push every 6 hours PRN Severe Pain (7 - 10)  ondansetron Injectable 4 milliGRAM(s) IV Push every 6 hours PRN Nausea and/or Vomiting    Allergies    Zosyn (Rash)    Intolerances        REVIEW OF SYSTEMS:    CONSTITUTIONAL: weakness  RESPIRATORY: mild sob  CARDIOVASCULAR: No chest pain or palpitations  GASTROINTESTINAL: as above  All other review of systems is negative unless indicated above.    Vital Signs Last 24 Hrs  T(C): 36.6 (29 Jul 2018 11:23), Max: 36.9 (28 Jul 2018 17:56)  T(F): 97.8 (29 Jul 2018 11:23), Max: 98.5 (28 Jul 2018 17:56)  HR: 72 (29 Jul 2018 11:23) (67 - 95)  BP: 123/35 (29 Jul 2018 11:23) (123/35 - 157/94)  BP(mean): --  RR: 18 (29 Jul 2018 11:23) (16 - 18)  SpO2: 95% (29 Jul 2018 11:23) (92% - 96%)    PHYSICAL EXAM:    Constitutional: appears chronically ill  Respiratory: CTA  Cardiovascular: S1 and S2  Gastrointestinal: BS+, soft      LABS:                        7.9    4.76  )-----------( 186      ( 29 Jul 2018 07:23 )             25.9     07-29    140  |  108  |  30<H>  ----------------------------<  87  3.6   |  25  |  3.57<H>    Ca    7.7<L>      29 Jul 2018 07:23  Phos  2.9     07-29  Mg     2.0     07-29    TPro  x   /  Alb  2.2<L>  /  TBili  x   /  DBili  x   /  AST  x   /  ALT  x   /  AlkPhos  x   07-29      LIVER FUNCTIONS - ( 29 Jul 2018 07:23 )  Alb: 2.2 g/dL / Pro: x     / ALK PHOS: x     / ALT: x     / AST: x     / GGT: x             RADIOLOGY & ADDITIONAL STUDIES:

## 2018-07-29 NOTE — PROGRESS NOTE ADULT - SUBJECTIVE AND OBJECTIVE BOX
Patient is a 83y old  Male who presents with a chief complaint of complain of diarrhea, fever (06 Jul 2018 23:55)    Date of service: 07-29-18 @ 15:24    Patient states he had 2 bowel movements today  Afebrile  Still with right lower quadrant pain        ROS: no fever or chills; denies dizziness, no HA, no SOB or cough, no  constipation; no dysuria, no urinary frequency, no legs pain, no rashes    MEDICATIONS  (STANDING):  ALBUTerol/ipratropium for Nebulization. 3 milliLiter(s) Nebulizer once  allopurinol 100 milliGRAM(s) Oral daily  aspirin  chewable 81 milliGRAM(s) Oral daily  buDESOnide   0.5 milliGRAM(s) Respule 0.5 milliGRAM(s) Inhalation two times a day  cholestyramine Powder (Sugar-Free) 4 Gram(s) Oral two times a day  diltiazem    Tablet 30 milliGRAM(s) Oral every 6 hours  doxazosin 8 milliGRAM(s) Oral at bedtime  epoetin nelly Injectable 3000 Unit(s) IV Push <User Schedule>  epoetin nelly Injectable 4000 Unit(s) IV Push <User Schedule>  finasteride 5 milliGRAM(s) Oral daily  heparin  Injectable 5000 Unit(s) SubCutaneous every 12 hours  metoclopramide 5 milliGRAM(s) Oral three times a day  metroNIDAZOLE  IVPB 250 milliGRAM(s) IV Intermittent every 8 hours  pantoprazole    Tablet 40 milliGRAM(s) Oral every 12 hours  simvastatin 10 milliGRAM(s) Oral at bedtime  sodium ferric gluconate complex Injectable 125 milliGRAM(s) IV Push <User Schedule>  vancomycin    Solution 500 milliGRAM(s) Oral every 6 hours    MEDICATIONS  (PRN):  acetaminophen   Tablet 650 milliGRAM(s) Oral every 8 hours PRN For Temp greater than 38 C (100.4 F)  acetaminophen   Tablet. 650 milliGRAM(s) Oral every 8 hours PRN Mild Pain (1 - 3)  albumin human 25% IVPB 50 milliLiter(s) IV Intermittent <User Schedule> PRN sbp<=120mmhg  ALBUTerol   0.5% 2.5 milliGRAM(s) Nebulizer every 4 hours PRN Shortness of Breath and/or Wheezing  diphenhydrAMINE   Capsule 25 milliGRAM(s) Oral at bedtime PRN sleep  HYDROmorphone  Injectable 0.5 milliGRAM(s) IV Push every 6 hours PRN Severe Pain (7 - 10)  ondansetron Injectable 4 milliGRAM(s) IV Push every 6 hours PRN Nausea and/or Vomiting      Vital Signs Last 24 Hrs  T(C): 36.6 (29 Jul 2018 11:23), Max: 36.9 (28 Jul 2018 17:56)  T(F): 97.8 (29 Jul 2018 11:23), Max: 98.5 (28 Jul 2018 17:56)  HR: 72 (29 Jul 2018 11:23) (67 - 95)  BP: 123/35 (29 Jul 2018 11:23) (123/35 - 137/41)  BP(mean): --  RR: 18 (29 Jul 2018 11:23) (17 - 18)  SpO2: 95% (29 Jul 2018 11:23) (92% - 96%)    Physical Exam:      PE:    Constitutional: frail looking  HEENT: NC/AT, EOMI, PERRLA, conjunctivae clear; ears and nose atraumatic; pharynx clear  Neck: supple; thyroid not palpable  Respiratory: respiratory effort normal; clear to auscultation  Cardiovascular: S1S2 regular, no murmurs  Abdomen: soft, right lower quadrant tender, difusely distended, positive BS; no liver or spleen organomegaly  Genitourinary: no suprapubic tenderness  Musculoskeletal: s/p right lower extremity amputation; left lower extremity with dressing in place  Neurological/ Psychiatric: AxOx3, judgement and insight normal;  moving all extremities  Skin: no rashes; no palpable lesions    Labs: all available labs reviewed                        Labs:           Labs:                        7.9    4.76  )-----------( 186      ( 29 Jul 2018 07:23 )             25.9     07-29    140  |  108  |  30<H>  ----------------------------<  87  3.6   |  25  |  3.57<H>    Ca    7.7<L>      29 Jul 2018 07:23  Phos  2.9     07-29  Mg     2.0     07-29    TPro  x   /  Alb  2.2<L>  /  TBili  x   /  DBili  x   /  AST  x   /  ALT  x   /  AlkPhos  x   07-29           Cultures:       Culture - Fungal, Body Fluid (collected 07-23-18 @ 09:00)  Source: Peritoneal Peritoneal Fluid  Preliminary Report (07-24-18 @ 07:53):    Testing in progress    Culture - Body Fluid with Gram Stain (collected 07-23-18 @ 09:00)  Source: Peritoneal Peritoneal Fluid  Gram Stain (07-23-18 @ 21:41):    polymorphonuclear leukocytes seen    No organisms seen    by cytocentrifuge  Final Report (07-28-18 @ 16:27):    No growth at 5 days    Culture - Acid Fast - Body Fluid w/Smear (collected 07-23-18 @ 09:00)  Source: Peritoneal Peritoneal Fluid               Clostridium difficile Toxin by PCR (07.06.18 @ 16:19)    C Diff by PCR Result: Detected    Clostridium difficile Toxin by PCR: The results of this test should be interpreted with consideration of all  clinical and laboratory findings. This test determines the presence of  the C. difficile tcdB gene at a given time and is not intended to  identify antibiotic associated disease or C. difficile infection without  clinical context. Successful treatment is based on the resolution of  clinical symptoms. This test should not be used as a "test of cure"  because C. difficile DNA will persist after successful treatment. Repeat  testing will not be permitted.    This test is performed on the BD MAX system using Real-Time PCR and  fluorogenic target-specific hybridization.        < from: CT Abdomen and Pelvis w/ Oral Cont (07.06.18 @ 18:03) >    IMPRESSION:     Severe pancolitis consistent with pseudomembranous colitis.    Mild pulmonary edema.    Small loculated right and trace layering left pleural effusions.      < end of copied text >    < from: CT Abdomen and Pelvis No Cont (07.21.18 @ 11:15) >    IMPRESSION:     Stable appearance of pancolitis consistent with pseudomembranous colitis.   No pneumatosis or free air.    Increasing moderate ascites.    Anasarca.    Urinary bladder cystitis. Correlate with UA.      < end of copied text >        Radiology: all available radiological tests reviewed    Advanced directives addressed: full resuscitation

## 2018-07-30 LAB
ALBUMIN SERPL ELPH-MCNC: 2.2 G/DL — LOW (ref 3.3–5)
ANION GAP SERPL CALC-SCNC: 9 MMOL/L — SIGNIFICANT CHANGE UP (ref 5–17)
BUN SERPL-MCNC: 37 MG/DL — HIGH (ref 7–23)
CALCIUM SERPL-MCNC: 7.3 MG/DL — LOW (ref 8.5–10.1)
CHLORIDE SERPL-SCNC: 109 MMOL/L — HIGH (ref 96–108)
CO2 SERPL-SCNC: 23 MMOL/L — SIGNIFICANT CHANGE UP (ref 22–31)
CREAT SERPL-MCNC: 4.19 MG/DL — HIGH (ref 0.5–1.3)
GLUCOSE SERPL-MCNC: 81 MG/DL — SIGNIFICANT CHANGE UP (ref 70–99)
HCT VFR BLD CALC: 25.1 % — LOW (ref 39–50)
HGB BLD-MCNC: 7.7 G/DL — LOW (ref 13–17)
MAGNESIUM SERPL-MCNC: 1.8 MG/DL — SIGNIFICANT CHANGE UP (ref 1.6–2.6)
MCHC RBC-ENTMCNC: 30.1 PG — SIGNIFICANT CHANGE UP (ref 27–34)
MCHC RBC-ENTMCNC: 30.7 GM/DL — LOW (ref 32–36)
MCV RBC AUTO: 98 FL — SIGNIFICANT CHANGE UP (ref 80–100)
NRBC # BLD: 0 /100 WBCS — SIGNIFICANT CHANGE UP (ref 0–0)
PHOSPHATE SERPL-MCNC: 3.3 MG/DL — SIGNIFICANT CHANGE UP (ref 2.5–4.5)
PLATELET # BLD AUTO: 200 K/UL — SIGNIFICANT CHANGE UP (ref 150–400)
POTASSIUM SERPL-MCNC: 3.6 MMOL/L — SIGNIFICANT CHANGE UP (ref 3.5–5.3)
POTASSIUM SERPL-SCNC: 3.6 MMOL/L — SIGNIFICANT CHANGE UP (ref 3.5–5.3)
RBC # BLD: 2.56 M/UL — LOW (ref 4.2–5.8)
RBC # FLD: 19.5 % — HIGH (ref 10.3–14.5)
SODIUM SERPL-SCNC: 141 MMOL/L — SIGNIFICANT CHANGE UP (ref 135–145)
WBC # BLD: 4.75 K/UL — SIGNIFICANT CHANGE UP (ref 3.8–10.5)
WBC # FLD AUTO: 4.75 K/UL — SIGNIFICANT CHANGE UP (ref 3.8–10.5)

## 2018-07-30 RX ORDER — FLUTICASONE PROPIONATE 50 MCG
1 SPRAY, SUSPENSION NASAL
Qty: 0 | Refills: 0 | Status: DISCONTINUED | OUTPATIENT
Start: 2018-07-30 | End: 2018-08-09

## 2018-07-30 RX ADMIN — Medication 500 MILLIGRAM(S): at 17:17

## 2018-07-30 RX ADMIN — Medication 500 MILLIGRAM(S): at 23:25

## 2018-07-30 RX ADMIN — Medication 5 MILLIGRAM(S): at 23:24

## 2018-07-30 RX ADMIN — Medication 50 MILLIGRAM(S): at 14:59

## 2018-07-30 RX ADMIN — CHOLESTYRAMINE 4 GRAM(S): 4 POWDER, FOR SUSPENSION ORAL at 08:35

## 2018-07-30 RX ADMIN — Medication 5 MILLIGRAM(S): at 12:47

## 2018-07-30 RX ADMIN — PANTOPRAZOLE SODIUM 40 MILLIGRAM(S): 20 TABLET, DELAYED RELEASE ORAL at 17:17

## 2018-07-30 RX ADMIN — HEPARIN SODIUM 5000 UNIT(S): 5000 INJECTION INTRAVENOUS; SUBCUTANEOUS at 05:59

## 2018-07-30 RX ADMIN — CHOLESTYRAMINE 4 GRAM(S): 4 POWDER, FOR SUSPENSION ORAL at 01:15

## 2018-07-30 RX ADMIN — Medication 50 MILLIGRAM(S): at 05:59

## 2018-07-30 RX ADMIN — Medication 0.5 MILLIGRAM(S): at 07:46

## 2018-07-30 RX ADMIN — HEPARIN SODIUM 5000 UNIT(S): 5000 INJECTION INTRAVENOUS; SUBCUTANEOUS at 17:17

## 2018-07-30 RX ADMIN — Medication 500 MILLIGRAM(S): at 05:58

## 2018-07-30 RX ADMIN — Medication 81 MILLIGRAM(S): at 12:47

## 2018-07-30 RX ADMIN — FINASTERIDE 5 MILLIGRAM(S): 5 TABLET, FILM COATED ORAL at 12:47

## 2018-07-30 RX ADMIN — SIMVASTATIN 10 MILLIGRAM(S): 20 TABLET, FILM COATED ORAL at 23:25

## 2018-07-30 RX ADMIN — Medication 8 MILLIGRAM(S): at 23:24

## 2018-07-30 RX ADMIN — Medication 500 MILLIGRAM(S): at 12:47

## 2018-07-30 RX ADMIN — PANTOPRAZOLE SODIUM 40 MILLIGRAM(S): 20 TABLET, DELAYED RELEASE ORAL at 06:03

## 2018-07-30 RX ADMIN — Medication 100 MILLIGRAM(S): at 12:48

## 2018-07-30 RX ADMIN — Medication 0.5 MILLIGRAM(S): at 19:33

## 2018-07-30 RX ADMIN — Medication 5 MILLIGRAM(S): at 05:58

## 2018-07-30 RX ADMIN — CHOLESTYRAMINE 4 GRAM(S): 4 POWDER, FOR SUSPENSION ORAL at 21:07

## 2018-07-30 RX ADMIN — Medication 50 MILLIGRAM(S): at 23:24

## 2018-07-30 NOTE — PROGRESS NOTE ADULT - PROBLEM SELECTOR PLAN 1
- follow up cultures   - iv hydration and supportive care   - serial cbc and monitor temperature   - back on vancomycin 500 mg po q 6 hours  - restarted flagyl 250 mg iv q 8 hours, lower dose given dialysis  - continue contact isolation  - limit antibiotics exposure

## 2018-07-30 NOTE — PROGRESS NOTE ADULT - PROBLEM SELECTOR PLAN 3
- s/p Tunnel Cath on 7/16/18, patient currently HD dependent  - Nephro consult appreciated: cont HD, Creatinine worsened past couple days  - HD today, no UF, no fluid restrictions - ARYA/CKD, with stable renal parameters Unclear if with increasing UOP.  Will hold off on HD tomorrow and assess in AM  - Cloudy urine with white sputum production: fu with ua and urine cultures

## 2018-07-30 NOTE — PROGRESS NOTE ADULT - ASSESSMENT
INTERVAL/OVERNIGHT EVENTS: This is a 84 y/o M w/pmhx of Afib, CHF, CAD s/p stents, COPD, recent PNA on abx, upper GI bleed (duodenal)  BIB EMS from [facility] for fever, abd pain, and diarrhea that began 3 weeks ago. Was in the hospital for PNA tx and was later dx with C-diff and started on a course of PO vancomycin for 10 days for the C-diff. States that he has had diarrhea since before the course of abx. Pain has been increased for the past few weeks and is characterized as a cramping feeling. Daughters also note that there is increased distension. Also notes chest pain characterized as a cramping in the central chest. 83% O2 sat noted morning of admission on 7/6/18, at the assisted living facility. Takes O2 routinely at night but not during the day.    7/29/2018: Patient feeling better. Less abdominal pain but still minimal tenderness. Still with diarrhea however less watery. Denies any additional acute complaints.

## 2018-07-30 NOTE — PROGRESS NOTE ADULT - SUBJECTIVE AND OBJECTIVE BOX
Patient is a 83y old  Male who presents with a chief complaint of Fever/Diarrhea (20 Jul 2018 13:30)      HPI:  Pt is a 84 y/o M w/pmhx of Afib, CHF, CAD s/p stents, COPD, PNA, upper GI bleed (duodenal)  BIB EMS from [facility] living for fever, abd pain, and diarrhea that began 3 weeks ago. Was in the hospital for PNA tx and was later dx with C-diff and started on a course of PO vancomycin for 10 days for the C-diff. States that he has had diarrhea since before the course of abx. Pain has been increased for the past few weeks and is characterized as a cramping feeling. Daughters also note that there is increased distension. Also notes chest pain characterized as a cramping in the central chest. 83% O2 sat noted this morning at the assisted living facility. Takes O2 routinely at night but not during the day. (06 Jul 2018 23:55)    Patient more comfortable. He has some right lower quadrant pain however, states that this is better. His crampy, crescendo decrescendo, negative radiation.  Diarrhea decreased to 5/24 hours per patient and RN  Case discussed with patient, RN and history is also per RN and daughter.      PAST MEDICAL & SURGICAL HISTORY:  Diastolic CHF  Peripheral vascular disease  Afib  Anemia  CKD (chronic kidney disease)  COPD (chronic obstructive pulmonary disease)  SHAYY (obstructive sleep apnea)  Sepsis, due to unspecified organism: 2/2 poorly healing wounds b/l  Dyspepsia: On moderate exertion.  Sleep apnea, obstructive: Requires home 02 therapy, and treatment with BIPAP  Atelectasis  Pleural effusion, bilateral  Respiratory failure  Peripheral edema  CRI (chronic renal insufficiency)  Gout  Benign prostatic hypertrophy  Spinal stenosis  Hypercholesterolemia  GERD (gastroesophageal reflux disease)  CAD (coronary artery disease)  Hypertension  S/P angioplasty with stent  Cataract of left eye  Prostate: Surgery green light procedure.  S/P rotator cuff surgery: Right  S/P angioplasty      MEDICATIONS  (STANDING):  ALBUTerol/ipratropium for Nebulization. 3 milliLiter(s) Nebulizer once  allopurinol 100 milliGRAM(s) Oral daily  aspirin  chewable 81 milliGRAM(s) Oral daily  buDESOnide   0.5 milliGRAM(s) Respule 0.5 milliGRAM(s) Inhalation two times a day  cholestyramine Powder (Sugar-Free) 4 Gram(s) Oral two times a day  diltiazem    Tablet 30 milliGRAM(s) Oral every 6 hours  doxazosin 8 milliGRAM(s) Oral at bedtime  epoetin nelly Injectable 3000 Unit(s) IV Push <User Schedule>  epoetin nelly Injectable 4000 Unit(s) IV Push <User Schedule>  finasteride 5 milliGRAM(s) Oral daily  heparin  Injectable 5000 Unit(s) SubCutaneous every 12 hours  metoclopramide 5 milliGRAM(s) Oral three times a day  metroNIDAZOLE  IVPB 250 milliGRAM(s) IV Intermittent every 8 hours  pantoprazole    Tablet 40 milliGRAM(s) Oral every 12 hours  simvastatin 10 milliGRAM(s) Oral at bedtime  sodium ferric gluconate complex Injectable 125 milliGRAM(s) IV Push <User Schedule>  vancomycin    Solution 500 milliGRAM(s) Oral every 6 hours    MEDICATIONS  (PRN):  acetaminophen   Tablet 650 milliGRAM(s) Oral every 8 hours PRN For Temp greater than 38 C (100.4 F)  acetaminophen   Tablet. 650 milliGRAM(s) Oral every 8 hours PRN Mild Pain (1 - 3)  albumin human 25% IVPB 50 milliLiter(s) IV Intermittent <User Schedule> PRN sbp<=120mmhg  ALBUTerol   0.5% 2.5 milliGRAM(s) Nebulizer every 4 hours PRN Shortness of Breath and/or Wheezing  diphenhydrAMINE   Capsule 25 milliGRAM(s) Oral at bedtime PRN sleep  HYDROmorphone  Injectable 0.5 milliGRAM(s) IV Push every 6 hours PRN Severe Pain (7 - 10)  ondansetron Injectable 4 milliGRAM(s) IV Push every 6 hours PRN Nausea and/or Vomiting      Allergies    Zosyn (Rash)    Intolerances        SOCIAL HISTORY:NC    FAMILY HISTORY:  No pertinent family history in first degree relatives      REVIEW OF SYSTEMS:    CONSTITUTIONAL: No weakness, fevers or chills  EYES/ENT: No visual changes;  No vertigo or throat pain   NECK: No pain or stiffness  RESPIRATORY: No cough, wheezing, hemoptysis; No shortness of breath  CARDIOVASCULAR: No chest pain or palpitations  GENITOURINARY: No dysuria, frequency or hematuria  NEUROLOGICAL: No numbness or weakness  SKIN: No itching, burning, rashes, or lesions   All other review of systems is negative unless indicated above.    Vital Signs Last 24 Hrs  T(C): 36.8 (29 Jul 2018 22:18), Max: 36.8 (29 Jul 2018 22:18)  T(F): 98.2 (29 Jul 2018 22:18), Max: 98.2 (29 Jul 2018 22:18)  HR: 78 (30 Jul 2018 07:49) (72 - 85)  BP: 135/43 (29 Jul 2018 22:18) (123/35 - 145/43)  BP(mean): --  RR: 18 (29 Jul 2018 22:18) (16 - 18)  SpO2: 95% (29 Jul 2018 22:18) (93% - 95%)    PHYSICAL EXAM:    Constitutional: NAD, well-developed  HEENT: EOMI, throat clear  Neck: No LAD, supple  Respiratory: CTA and P  Cardiovascular: S1 and S2, RRR, no M  Gastrointestinal: BS+, soft, NT/ND, neg HSM,  Extremities: No peripheral edema, neg clubing, cyanosis    Neurological: A/O x 3, no focal deficits  Psychiatric: Normal mood, normal affect  Skin: No rashes    LABS:  CBC Full  -  ( 30 Jul 2018 05:52 )  WBC Count : 4.75 K/uL  Hemoglobin : 7.7 g/dL  Hematocrit : 25.1 %  Platelet Count - Automated : 200 K/uL  Mean Cell Volume : 98.0 fl  Mean Cell Hemoglobin : 30.1 pg  Mean Cell Hemoglobin Concentration : 30.7 gm/dL  Auto Neutrophil # : x  Auto Lymphocyte # : x  Auto Monocyte # : x  Auto Eosinophil # : x  Auto Basophil # : x  Auto Neutrophil % : x  Auto Lymphocyte % : x  Auto Monocyte % : x  Auto Eosinophil % : x  Auto Basophil % : x    07-30    141  |  109<H>  |  37<H>  ----------------------------<  81  3.6   |  23  |  4.19<H>    Ca    7.3<L>      30 Jul 2018 05:52  Phos  3.3     07-30  Mg     1.8     07-30    TPro  x   /  Alb  2.2<L>  /  TBili  x   /  DBili  x   /  AST  x   /  ALT  x   /  AlkPhos  x   07-30            RADIOLOGY & ADDITIONAL STUDIES:

## 2018-07-30 NOTE — PROGRESS NOTE ADULT - SUBJECTIVE AND OBJECTIVE BOX
NEPHROLOGY INTERVAL HPI/OVERNIGHT EVENTS:  6 loose bm yesterday  2+ UOP with about 75-150cc each, cloudy      MEDICATIONS  (STANDING):  ALBUTerol/ipratropium for Nebulization. 3 milliLiter(s) Nebulizer once  allopurinol 100 milliGRAM(s) Oral daily  aspirin  chewable 81 milliGRAM(s) Oral daily  buDESOnide   0.5 milliGRAM(s) Respule 0.5 milliGRAM(s) Inhalation two times a day  cholestyramine Powder (Sugar-Free) 4 Gram(s) Oral two times a day  diltiazem    Tablet 30 milliGRAM(s) Oral every 6 hours  doxazosin 8 milliGRAM(s) Oral at bedtime  epoetin nelly Injectable 3000 Unit(s) IV Push <User Schedule>  epoetin nelly Injectable 4000 Unit(s) IV Push <User Schedule>  finasteride 5 milliGRAM(s) Oral daily  heparin  Injectable 5000 Unit(s) SubCutaneous every 12 hours  metoclopramide 5 milliGRAM(s) Oral three times a day  metroNIDAZOLE  IVPB 250 milliGRAM(s) IV Intermittent every 8 hours  pantoprazole    Tablet 40 milliGRAM(s) Oral every 12 hours  simvastatin 10 milliGRAM(s) Oral at bedtime  sodium ferric gluconate complex Injectable 125 milliGRAM(s) IV Push <User Schedule>  vancomycin    Solution 500 milliGRAM(s) Oral every 6 hours    MEDICATIONS  (PRN):  acetaminophen   Tablet 650 milliGRAM(s) Oral every 8 hours PRN For Temp greater than 38 C (100.4 F)  acetaminophen   Tablet. 650 milliGRAM(s) Oral every 8 hours PRN Mild Pain (1 - 3)  albumin human 25% IVPB 50 milliLiter(s) IV Intermittent <User Schedule> PRN sbp<=120mmhg  ALBUTerol   0.5% 2.5 milliGRAM(s) Nebulizer every 4 hours PRN Shortness of Breath and/or Wheezing  diphenhydrAMINE   Capsule 25 milliGRAM(s) Oral at bedtime PRN sleep  HYDROmorphone  Injectable 0.5 milliGRAM(s) IV Push every 6 hours PRN Severe Pain (7 - 10)  ondansetron Injectable 4 milliGRAM(s) IV Push every 6 hours PRN Nausea and/or Vomiting      Allergies    Zosyn (Rash)    Intolerances        I&O's Detail    2018 07:01  -  2018 07:00  --------------------------------------------------------  IN:  Total IN: 0 mL    OUT:    Voided: 75 mL  Total OUT: 75 mL    Total NET: -75 mL        Vital Signs Last 24 Hrs  T(C): 36.8 (2018 22:18), Max: 36.8 (2018 22:18)  T(F): 98.2 (2018 22:18), Max: 98.2 (2018 22:18)  HR: 78 (2018 07:49) (72 - 85)  BP: 135/43 (2018 22:18) (123/35 - 145/43)  BP(mean): --  RR: 18 (2018 22:18) (16 - 18)  SpO2: 95% (2018 22:18) (93% - 95%)  Daily     Daily Weight in k.2 (2018 10:47)    PHYSICAL EXAM:  General: alert. awake Ox3  HEENT: MMM  CV: s1s2 rrr  LUNGS: B/L CTA  EXT: no edema    LABS:                        7.7    4.75  )-----------( 200      ( 2018 05:52 )             25.1     0730    141  |  109<H>  |  37<H>  ----------------------------<  81  3.6   |  23  |  4.19<H>    Ca    7.3<L>      2018 05:52  Phos  3.3       Mg     1.8         TPro  x   /  Alb  2.2<L>  /  TBili  x   /  DBili  x   /  AST  x   /  ALT  x   /  AlkPhos  x           Phosphorus Level, Serum: 3.3 mg/dL ( @ 05:52)  Magnesium, Serum: 1.8 mg/dL ( @ 05:52)

## 2018-07-30 NOTE — PROGRESS NOTE ADULT - SUBJECTIVE AND OBJECTIVE BOX
INTERVAL/OVERNIGHT EVENTS: This is a 84 y/o M w/pmhx of Afib, CHF, CAD s/p stents, COPD, recent PNA on abx, upper GI bleed (duodenal)  BIB EMS from [facility] for fever, abd pain, and diarrhea that began 3 weeks ago. Was in the hospital for PNA tx and was later dx with C-diff and started on a course of PO vancomycin for 10 days for the C-diff. States that he has had diarrhea since before the course of abx. Pain has been increased for the past few weeks and is characterized as a cramping feeling. Daughters also note that there is increased distension. Also notes chest pain characterized as a cramping in the central chest. 83% O2 sat noted morning of admission on 7/6/18, at the assisted living facility. Takes O2 routinely at night but not during the day.    7/29/2018: Patient feeling better. Less abdominal pain but still minimal tenderness. Still with diarrhea however less watery. Denies any additional acute complaints.      REVIEW OF SYSTEMS:  CONSTITUTIONAL: No weakness, fevers or chills  EYES/ENT: No visual changes;  No vertigo or throat pain   NECK: No pain or stiffness  RESPIRATORY: No cough, wheezing, hemoptysis; No shortness of breath  CARDIOVASCULAR: No chest pain or palpitations  GASTROINTESTINAL: +abdominal pain, no epigastric pain. No nausea, vomiting, or hematemesis; No diarrhea or constipation. No melena or hematochezia.  GENITOURINARY: No dysuria, frequency or hematuria  NEUROLOGICAL: No numbness or weakness  SKIN: No itching, burning, rashes, or lesions   All other review of systems is negative unless indicated above.      VS reviewed, stable.  Gen: patient is frail appearing, but in no acute distress  HEENT: NC/AT  Neck: FROM, supple, no cervical LAD  Chest: CTA b/l, no crackles/wheezes, good air entry, no tachypnea or retractions  CV: regular rate and rhythm, no murmurs   Abd: soft, mild RLQ tenderness, mild distention, no HSM appreciated, +BS  Back: no vertebral or paraspinal tenderness along entire spine; no CVAT  Extrem: 2+ peripheral pulses, WWP.   Neuro: non-focal    Interval Lab Results:               CBC Full  -  ( 30 Jul 2018 05:52 )  WBC Count : 4.75 K/uL  Hemoglobin : 7.7 g/dL  Hematocrit : 25.1 %  Platelet Count - Automated : 200 K/uL      141  |  109<H>  |  37<H>  ----------------------------<  81  3.6   |  23  |  4.19<H>    Ca    7.3<L>      30 Jul 2018 05:52  Phos  3.3     07-30  Mg     1.8     07-30    TPro  x   /  Alb  2.2<L>  /  TBili  x   /  DBili  x   /  AST  x   /  ALT  x   /  AlkPhos  x   07-30 INTERVAL/OVERNIGHT EVENTS: This is a 82 y/o M w/pmhx of Afib, CHF, CAD s/p stents, COPD, recent PNA on abx, upper GI bleed (duodenal)  BIB EMS from [facility] assisted living for fever, abd pain, and diarrhea that began 3 weeks ago. Was in the hospital for PNA tx and was later dx with C-diff and started on a course of PO vancomycin for 10 days for the C-diff. States that he has had diarrhea since before the course of abx. Pain has been increased for the past few weeks and is characterized as a cramping feeling. Daughters also note that there is increased distension. Also notes chest pain characterized as a cramping in the central chest. 83% O2 sat noted morning of admission on 7/6/18, at the assisted living facility. Takes O2 routinely at night but not during the day.    7/30: seen and eval. Hemodynamically stable. Denies any HA, CP, SOB. abdominal pain better. No fevers, chills or shakes.       REVIEW OF SYSTEMS:    CONSTITUTIONAL: No weakness, fevers or chills  EYES/ENT: No visual changes;  No vertigo or throat pain   NECK: No pain or stiffness  RESPIRATORY: No cough, wheezing, hemoptysis; No shortness of breath  CARDIOVASCULAR: No chest pain or palpitations  GASTROINTESTINAL: (+) abdominal pain, no epigastric pain. No nausea, vomiting, or hematemesis; No diarrhea or constipation. No melena or hematochezia.  GENITOURINARY: No dysuria, frequency or hematuria  NEUROLOGICAL: No numbness or weakness  SKIN: No itching, burning, rashes, or lesions   All other review of systems is negative unless indicated above.    Physical:     T(C): 36.8 (29 Jul 2018 22:18), Max: 36.8 (29 Jul 2018 22:18)  T(F): 98.2 (29 Jul 2018 22:18), Max: 98.2 (29 Jul 2018 22:18)  HR: 78 (30 Jul 2018 07:49) (74 - 85)  BP: 135/43 (29 Jul 2018 22:18) (135/43 - 145/43)  RR: 18 (29 Jul 2018 22:18) (16 - 18)  SpO2: 95% (29 Jul 2018 22:18) (94% - 95%)  Gen: patient is frail appearing, but in no acute distress  HEENT: NC/AT  Neck: FROM, supple, no cervical LAD  Chest: CTA b/l, no crackles/wheezes, good air entry, no tachypnea or retractions  CV: regular rate and rhythm, no murmurs   Abd: soft, mild RLQ tenderness, mild distention, no HSM appreciated, +BS  Back: no vertebral or paraspinal tenderness along entire spine; no CVAT  Extrem: 2+ peripheral pulses, WWP.   Neuro: non-focal    Lab Results:  CBC Full  -  ( 30 Jul 2018 05:52 )  WBC Count : 4.75 K/uL  Hemoglobin : 7.7 g/dL  Hematocrit : 25.1 %  Platelet Count - Automated : 200 K/uL      141  |  109<H>  |  37<H>  ----------------------------<  81  3.6   |  23  |  4.19<H>    Ca    7.3<L>      30 Jul 2018 05:52  Phos  3.3     07-30  Mg     1.8     07-30    TPro  x   /  Alb  2.2<L>  /  TBili  x   /  DBili  x   /  AST  x   /  ALT  x   /  AlkPhos  x   07-30

## 2018-07-30 NOTE — PROGRESS NOTE ADULT - ASSESSMENT
82 y/o M w/pmhx of Afib, CHF, CAD s/p stents, COPD, recent PNA on abx, upper GI bleed (duodenal) was admitted for recurrent c. diff colitis. CT showed appearance of pancolitis consistent with pseudomembranous colitis, no pneumatosis or free air. Increasing moderate ascites, anasarca and urinary bladder cyctitis.

## 2018-07-30 NOTE — PROGRESS NOTE ADULT - PROBLEM SELECTOR PLAN 5
non recurrent, now in sinus rhythm RRR  - EP consult appreciated: likely vagal, may be 2/2 SHAYY. - non recurrent, now in sinus rhythm RRR  - EP consult appreciated: likely vagal, may be 2/2 SHAYY.

## 2018-07-30 NOTE — PROGRESS NOTE ADULT - SUBJECTIVE AND OBJECTIVE BOX
Patient is a 83y old  Male who presents with a chief complaint of complain of diarrhea, fever (06 Jul 2018 23:55)    Date of service: 07-30-18 @ 14:18    Patient lying in bed  Had 2 bowel movements since yesterday        ROS: no fever or chills; denies dizziness, no HA, no SOB or cough, no abdominal pain, no diarrhea or constipation; no dysuria, no urinary frequency, no legs pain, no rashes    MEDICATIONS  (STANDING):  ALBUTerol/ipratropium for Nebulization. 3 milliLiter(s) Nebulizer once  allopurinol 100 milliGRAM(s) Oral daily  aspirin  chewable 81 milliGRAM(s) Oral daily  buDESOnide   0.5 milliGRAM(s) Respule 0.5 milliGRAM(s) Inhalation two times a day  cholestyramine Powder (Sugar-Free) 4 Gram(s) Oral two times a day  diltiazem    Tablet 30 milliGRAM(s) Oral every 6 hours  doxazosin 8 milliGRAM(s) Oral at bedtime  epoetin nelly Injectable 3000 Unit(s) IV Push <User Schedule>  epoetin nelly Injectable 4000 Unit(s) IV Push <User Schedule>  finasteride 5 milliGRAM(s) Oral daily  heparin  Injectable 5000 Unit(s) SubCutaneous every 12 hours  metoclopramide 5 milliGRAM(s) Oral three times a day  metroNIDAZOLE  IVPB 250 milliGRAM(s) IV Intermittent every 8 hours  pantoprazole    Tablet 40 milliGRAM(s) Oral every 12 hours  simvastatin 10 milliGRAM(s) Oral at bedtime  sodium ferric gluconate complex Injectable 125 milliGRAM(s) IV Push <User Schedule>  vancomycin    Solution 500 milliGRAM(s) Oral every 6 hours    MEDICATIONS  (PRN):  acetaminophen   Tablet 650 milliGRAM(s) Oral every 8 hours PRN For Temp greater than 38 C (100.4 F)  acetaminophen   Tablet. 650 milliGRAM(s) Oral every 8 hours PRN Mild Pain (1 - 3)  albumin human 25% IVPB 50 milliLiter(s) IV Intermittent <User Schedule> PRN sbp<=120mmhg  ALBUTerol   0.5% 2.5 milliGRAM(s) Nebulizer every 4 hours PRN Shortness of Breath and/or Wheezing  diphenhydrAMINE   Capsule 25 milliGRAM(s) Oral at bedtime PRN sleep  HYDROmorphone  Injectable 0.5 milliGRAM(s) IV Push every 6 hours PRN Severe Pain (7 - 10)  ondansetron Injectable 4 milliGRAM(s) IV Push every 6 hours PRN Nausea and/or Vomiting      Vital Signs Last 24 Hrs  T(C): 36.8 (29 Jul 2018 22:18), Max: 36.8 (29 Jul 2018 22:18)  T(F): 98.2 (29 Jul 2018 22:18), Max: 98.2 (29 Jul 2018 22:18)  HR: 78 (30 Jul 2018 07:49) (74 - 85)  BP: 135/43 (29 Jul 2018 22:18) (135/43 - 145/43)  BP(mean): --  RR: 18 (29 Jul 2018 22:18) (16 - 18)  SpO2: 95% (29 Jul 2018 22:18) (94% - 95%)    Physical Exam:          Physical Exam:      PE:    Constitutional: frail looking  HEENT: NC/AT, EOMI, PERRLA, conjunctivae clear; ears and nose atraumatic; pharynx clear  Neck: supple; thyroid not palpable  Respiratory: respiratory effort normal; clear to auscultation  Cardiovascular: S1S2 regular, no murmurs  Abdomen: soft, right lower quadrant tender, difusely distended, positive BS; no liver or spleen organomegaly  Genitourinary: no suprapubic tenderness  Musculoskeletal: s/p right lower extremity amputation; left lower extremity with dressing in place  Neurological/ Psychiatric: AxOx3, judgement and insight normal;  moving all extremities  Skin: no rashes; no palpable lesions    Labs: all available labs reviewed                        Labs:           Labs:                        7.9    4.76  )-----------( 186      ( 29 Jul 2018 07:23 )             25.9     07-29    140  |  108  |  30<H>  ----------------------------<  87  3.6   |  25  |  3.57<H>    Ca    7.7<L>      29 Jul 2018 07:23  Phos  2.9     07-29  Mg     2.0     07-29    TPro  x   /  Alb  2.2<L>  /  TBili  x   /  DBili  x   /  AST  x   /  ALT  x   /  AlkPhos  x   07-29           Cultures:       Culture - Fungal, Body Fluid (collected 07-23-18 @ 09:00)  Source: Peritoneal Peritoneal Fluid  Preliminary Report (07-24-18 @ 07:53):    Testing in progress    Culture - Body Fluid with Gram Stain (collected 07-23-18 @ 09:00)  Source: Peritoneal Peritoneal Fluid  Gram Stain (07-23-18 @ 21:41):    polymorphonuclear leukocytes seen    No organisms seen    by cytocentrifuge  Final Report (07-28-18 @ 16:27):    No growth at 5 days    Culture - Acid Fast - Body Fluid w/Smear (collected 07-23-18 @ 09:00)  Source: Peritoneal Peritoneal Fluid               Clostridium difficile Toxin by PCR (07.06.18 @ 16:19)    C Diff by PCR Result: Detected    Clostridium difficile Toxin by PCR: The results of this test should be interpreted with consideration of all  clinical and laboratory findings. This test determines the presence of  the C. difficile tcdB gene at a given time and is not intended to  identify antibiotic associated disease or C. difficile infection without  clinical context. Successful treatment is based on the resolution of  clinical symptoms. This test should not be used as a "test of cure"  because C. difficile DNA will persist after successful treatment. Repeat  testing will not be permitted.    This test is performed on the BD MAX system using Real-Time PCR and  fluorogenic target-specific hybridization.        < from: CT Abdomen and Pelvis w/ Oral Cont (07.06.18 @ 18:03) >    IMPRESSION:     Severe pancolitis consistent with pseudomembranous colitis.    Mild pulmonary edema.    Small loculated right and trace layering left pleural effusions.      < end of copied text >    < from: CT Abdomen and Pelvis No Cont (07.21.18 @ 11:15) >    IMPRESSION:     Stable appearance of pancolitis consistent with pseudomembranous colitis.   No pneumatosis or free air.    Increasing moderate ascites.    Anasarca.    Urinary bladder cystitis. Correlate with UA.      < end of copied text >        Radiology: all available radiological tests reviewed    Advanced directives addressed: full resuscitation

## 2018-07-30 NOTE — PROGRESS NOTE ADULT - SUBJECTIVE AND OBJECTIVE BOX
CHIEF COMPLAINT: Diarrhea and fever *3 weeks    SUBJECTIVE:   HPI: This is a 84 y/o M w/pmhx of Afib, CHF, CAD s/p stents, COPD, recent PNA on abx, upper GI bleed (duodenal)  BIB EMS from [facility] for fever, abd pain, and diarrhea that began 3 weeks ago. Was in the hospital for PNA tx and was later dx with C-diff and started on a course of PO vancomycin for 10 days for the C-diff. States that he has had diarrhea since before the course of abx. Pain has been increased for the past few weeks and is characterized as a cramping feeling. Daughters also note that there is increased distension. Also notes chest pain characterized as a cramping in the central chest. 83% O2 sat noted morning of admission on 7/6/18, at the [facility] living facility. Takes O2 routinely at night but not during the day.      7/30/18:  Pt seen and evaluated. Pt reports feeling better. Abdominal pain minimal. Diarrhea is resolving and becoming less watery. Complained of having runny nose, clear. No other complaints.     REVIEW OF SYSTEMS:  CONSTITUTIONAL: No weakness, fevers or chills  EYES/ENT: No visual changes;  No vertigo or throat pain   NECK: No pain or stiffness  RESPIRATORY: No cough, wheezing, hemoptysis; No shortness of breath  CARDIOVASCULAR: No chest pain or palpitations  GASTROINTESTINAL: No abdominal or epigastric pain. No nausea, vomiting, or hematemesis; No diarrhea or constipation. No melena or hematochezia.  GENITOURINARY: No dysuria, frequency or hematuria  NEUROLOGICAL: No numbness or weakness  SKIN: No itching, burning, rashes, or lesions   All other review of systems is negative unless indicated above    Vital Signs Last 24 Hrs  T(C): 36.7 (30 Jul 2018 16:56), Max: 36.8 (29 Jul 2018 22:18)  T(F): 98.1 (30 Jul 2018 16:56), Max: 98.2 (29 Jul 2018 22:18)  HR: 78 (30 Jul 2018 16:56) (74 - 85)  BP: 126/33 (30 Jul 2018 16:56) (126/33 - 135/43)  BP(mean): --  RR: 18 (30 Jul 2018 16:56) (18 - 18)  SpO2: 100% (30 Jul 2018 16:56) (95% - 100%)    I&O's Summary    29 Jul 2018 07:01  -  30 Jul 2018 07:00  --------------------------------------------------------  IN: 0 mL / OUT: 75 mL / NET: -75 mL        CAPILLARY BLOOD GLUCOSE          PHYSICAL EXAM:  Constitutional: NAD, awake and alert, well-developed  Respiratory: Breath sounds are clear bilaterally, No wheezing, rales or rhonchi  Cardiovascular: S1 and S2, regular rate and rhythm, no Murmurs, gallops or rubs  Gastrointestinal: Bowel Sounds present, soft, nontender, nondistended, no guarding, no rebound  Extremities: No peripheral edema  Vascular: 2+ peripheral pulses  Neurological: A/O x 3, no focal deficits  Skin: No rashes    MEDICATIONS:  MEDICATIONS  (STANDING):  ALBUTerol/ipratropium for Nebulization. 3 milliLiter(s) Nebulizer once  allopurinol 100 milliGRAM(s) Oral daily  aspirin  chewable 81 milliGRAM(s) Oral daily  buDESOnide   0.5 milliGRAM(s) Respule 0.5 milliGRAM(s) Inhalation two times a day  cholestyramine Powder (Sugar-Free) 4 Gram(s) Oral two times a day  diltiazem    Tablet 30 milliGRAM(s) Oral every 6 hours  doxazosin 8 milliGRAM(s) Oral at bedtime  epoetin nelly Injectable 3000 Unit(s) IV Push <User Schedule>  epoetin nelly Injectable 4000 Unit(s) IV Push <User Schedule>  finasteride 5 milliGRAM(s) Oral daily  heparin  Injectable 5000 Unit(s) SubCutaneous every 12 hours  metoclopramide 5 milliGRAM(s) Oral three times a day  metroNIDAZOLE  IVPB 250 milliGRAM(s) IV Intermittent every 8 hours  pantoprazole    Tablet 40 milliGRAM(s) Oral every 12 hours  simvastatin 10 milliGRAM(s) Oral at bedtime  sodium ferric gluconate complex Injectable 125 milliGRAM(s) IV Push <User Schedule>  vancomycin    Solution 500 milliGRAM(s) Oral every 6 hours      LABS: All Labs Reviewed:                        7.7    4.75  )-----------( 200      ( 30 Jul 2018 05:52 )             25.1     07-30    141  |  109<H>  |  37<H>  ----------------------------<  81  3.6   |  23  |  4.19<H>    Ca    7.3<L>      30 Jul 2018 05:52  Phos  3.3     07-30  Mg     1.8     07-30    TPro  x   /  Alb  2.2<L>  /  TBili  x   /  DBili  x   /  AST  x   /  ALT  x   /  AlkPhos  x   07-30          Blood Culture:     RADIOLOGY/EKG:    DVT PPX:    ADVANCED DIRECTIVE:    DISPOSITION:

## 2018-07-30 NOTE — PROGRESS NOTE ADULT - ASSESSMENT
82 y/o wm with hx of ckd stage 3 ( scr 1.5-1.7) with ARYA due to volume depletion from CDiff associated colitis and diarrhea in presence of diuretic use PTA.  Now with non anion gap metabolic acidosis and worsening renal parameters.  CXR with mild CHF with hx of diastolic CHF.    PLAN  - obtain abd and lactate  - will change ivf and add bicarbonate and keep at current rate  - hold lasix done.   - fu uop and scr closely and will monitor respiratory status  - bp and hr stable now, cardizem adjusted and lopressor added    7/9 MK  - ARYA: worsening renal parameters with metabolic acidosis, non ag.  Previous lactate WNL and since placed on bicarbonate drip  fu repeat abg today.  Increase IVF rate  possibility of HD discussed with enio, hcp daughter, pt has been agreeable in past.   DC hydralazine  dc morphine and change to dilaudid  - Cdiff with rising scr and worsening abd distension: CRS consult ongoing  possible repeat ct scan  DW Dr ballesteros    7/10  Anuric  anasarca  Non anion gap acidosis on bicarb drip  ARYA, anuric  CKD 3 history  d/w Family, and pt agreeable  called ICU for line placement and to dialyze the pt in ICU for monitoring    7/10  HD initiated via R temp HD catheter  tolerating well  no complaints  good abf    7/11 SY  --ARYA with Anasarca.  Urine output slightly improved.  However, remains quite fluid overloaded.  Will plan to continue HD until fluid status is much improved.  HD order written.  3 hour tx today.  Will aim to remove 2.5 kg as tolerated.  --C DIff colitis ; continue to monitor closely.    7/12 SY  --ARYA with Anasarca.   Remains Oligo-anuric.    --HD with goal of 2.5 kg UF again today.  --C Diff colitis.   Clinically improved   Continue to monitor.    7/13  The patient was dialyzed 3 days in a row this week  Discussed with the patient's daughter and hospitalist, intensivist  Will skip dialysis today, no urgent need for dialysis  We'll dialyze the patient tomorrow on Saturday and then skip Sunday to dialyze the patient again on Monday.    7/14  We'll dialyze against 4 K bath  Case discussed with the patient's daughter  May need a permanent catheter by Monday      7/15  Dialyze with a 4K bath  Potassium is 3.3 most likely from the diarrhea  Schedule for permacath placement after dialysis on Monday or Tuesday  Patient is comfortable no complications noted at this time    7/16 MK  - ARYA/ CKD stage 3 remains oliguirc and HD dependent.  LImited UF at HD toleated with the   hypokalemia corrected with HD.  Permcath planned today  - Cdiff: on PO vanc.  improved leukocytosis and abd distension     7/17  s/p perm cath  HD tomorrow  4 K bath  replace K   overall stable    7/18 SY  --ARYA/CKD for now remaining dialysis dependent.  Continue TIW HD.  --C Diff colitis ; improving clinically.    7/19 SY  --ARYA/CKD : Continue TIW HD.   Will continue as outpatient for now as no signs of recovery at this point.  --C Diff colitis  : clinically improving.  Continue po abtx.  --D/c Planning  D/w pt's daughter re : rehab with HD.  --Replete K.  Continue regular diet without restrictions for now.    7/20 MK  - ARYA/CKD remains HD dependent with oliguria.  Will attempt UF with HD.  K correction with HD and changed to regular diet with fluid restriction  - Cdiff: PO vanc with improving renal paramenters-  - Episodes of Desat: will have pulmonary re-eval prior to dc.  - DC planning rehab with dialysis  dw Dr Hightower  will see pt    7/21 MK  - ARYA/CKD remains hd dependent. post hd cxr with improved PVC.  Still with episodes of desaturation and dr hightower input noted.  CT with evidence of ascites for paracentesis  - cdiff: on po vanc.  still with loose stools,     7/22 MK  - ARYA/CKD remains HD dependent.  Will coordinate AM HD based upon timing of paracentesis, hypokalemia improved  - hypoxia with ascites: for paracentesis   - cdif on po vanc, rectal tube in place    7/23 MK  - ARYA/CKD remains HD dependent, toelrating hd.  + inc uop   - hypoxia with ascites: s/p paracentesis today, monitor response to oxygenation  - AOCD; fu trend of h/h, on epogen  - cdiff: po vanc    7/24 SY  --ARYA/CKD  Urine output may be increasing.  Follow creat trend to determine further dialysis support.  --Post Paracentesis : improve resp status.  --Anemia : continue LETICIA.  --C Diff : continue po Vanco.    7/25 SY  --ARYA/CKD  Monitor urine output.  Noted with increased creat.  Will maintain on TIW HD since fluid overloaded state is persistent.  --Monitor GI function.  --Post Paracentesis : resp status stabilized.  --Anemia :continue LETICIA.    7/26 SY  --ARYA/CKD  : Creat slowly  trending down.  Urine output not recorded.  Check creat in am to determine whether HD can be held off.  --Recurrent abdominal pain of concern.  Being assessed by primary team and GI.  --Bladder scan to assure full void.    7/27 MK  - ARYA/CKD: with + inc Uop and significant volume depletion overnight.  Bladder scan of 77 ml with stable respiratory status.  NO Hd today and reassess in the AM.      7/28 SY  --ARYA /CKD : positive increase in urine output. However, renal parameters remain elevated.  Therefore, will maintain on TIW HD schedule for now.  --Pt's fluid status has much improved.   Will HD today with no UF and allow increased fluid intake without restrictions.  --C DIff : continue to monitor and continue po abtx.    7/29 SY  --ARYA/CKD   post HD yesterday.   Continue to monitor for signs of renal recovery.  --Fluid overloaded state much improved.  --Monitor on regular diet.  --C Diff : continue po abtx.    7/30 MK  - ARYA/CKD, with stable renal parameters Unclear if with increasing UOP.  Will hold off on HD tomorrow and assess in AM  - Cloudy urine with white sputum production: fu with ua and urine cultures

## 2018-07-30 NOTE — PROGRESS NOTE ADULT - ASSESSMENT
c diff colitis  By mouth vancomycin   case discussed with  family  Patient with continued abdominal pain and possible inc diarrhea, discussed with ID,  increased vanco to 500 4 times a day and add IV Flagyl.  DW ID        Discussed with family,  decrease Reglan dosage.      A fibrillation    Would hold anticoagulation secondary to history bleeding and colitis    COPD obesity, obstructive sleep apnea, coronary artery disease, overall comorbid risk factors     IVF per renal  HD as needed      acites, SP paracentesis

## 2018-07-31 LAB
ALBUMIN SERPL ELPH-MCNC: 2.2 G/DL — LOW (ref 3.3–5)
ANION GAP SERPL CALC-SCNC: 8 MMOL/L — SIGNIFICANT CHANGE UP (ref 5–17)
APPEARANCE UR: ABNORMAL
BACTERIA # UR AUTO: ABNORMAL
BILIRUB UR-MCNC: NEGATIVE — SIGNIFICANT CHANGE UP
BUN SERPL-MCNC: 47 MG/DL — HIGH (ref 7–23)
CALCIUM SERPL-MCNC: 7.5 MG/DL — LOW (ref 8.5–10.1)
CHLORIDE SERPL-SCNC: 111 MMOL/L — HIGH (ref 96–108)
CO2 SERPL-SCNC: 22 MMOL/L — SIGNIFICANT CHANGE UP (ref 22–31)
COLOR SPEC: YELLOW — SIGNIFICANT CHANGE UP
CREAT SERPL-MCNC: 4.88 MG/DL — HIGH (ref 0.5–1.3)
DIFF PNL FLD: ABNORMAL
EPI CELLS # UR: SIGNIFICANT CHANGE UP
GLUCOSE SERPL-MCNC: 78 MG/DL — SIGNIFICANT CHANGE UP (ref 70–99)
GLUCOSE UR QL: NEGATIVE MG/DL — SIGNIFICANT CHANGE UP
HCT VFR BLD CALC: 25.6 % — LOW (ref 39–50)
HGB BLD-MCNC: 7.8 G/DL — LOW (ref 13–17)
KETONES UR-MCNC: ABNORMAL
LEUKOCYTE ESTERASE UR-ACNC: ABNORMAL
MAGNESIUM SERPL-MCNC: 1.9 MG/DL — SIGNIFICANT CHANGE UP (ref 1.6–2.6)
MCHC RBC-ENTMCNC: 29.7 PG — SIGNIFICANT CHANGE UP (ref 27–34)
MCHC RBC-ENTMCNC: 30.5 GM/DL — LOW (ref 32–36)
MCV RBC AUTO: 97.3 FL — SIGNIFICANT CHANGE UP (ref 80–100)
NITRITE UR-MCNC: NEGATIVE — SIGNIFICANT CHANGE UP
NRBC # BLD: 0 /100 WBCS — SIGNIFICANT CHANGE UP (ref 0–0)
PH UR: 6 — SIGNIFICANT CHANGE UP (ref 5–8)
PHOSPHATE SERPL-MCNC: 3.8 MG/DL — SIGNIFICANT CHANGE UP (ref 2.5–4.5)
PLATELET # BLD AUTO: 214 K/UL — SIGNIFICANT CHANGE UP (ref 150–400)
POTASSIUM SERPL-MCNC: 3.6 MMOL/L — SIGNIFICANT CHANGE UP (ref 3.5–5.3)
POTASSIUM SERPL-SCNC: 3.6 MMOL/L — SIGNIFICANT CHANGE UP (ref 3.5–5.3)
PROT UR-MCNC: 500 MG/DL
RBC # BLD: 2.63 M/UL — LOW (ref 4.2–5.8)
RBC # FLD: 19.4 % — HIGH (ref 10.3–14.5)
RBC CASTS # UR COMP ASSIST: ABNORMAL /HPF (ref 0–4)
SODIUM SERPL-SCNC: 141 MMOL/L — SIGNIFICANT CHANGE UP (ref 135–145)
SP GR SPEC: 1.01 — SIGNIFICANT CHANGE UP (ref 1.01–1.02)
UROBILINOGEN FLD QL: NEGATIVE MG/DL — SIGNIFICANT CHANGE UP
WBC # BLD: 6.18 K/UL — SIGNIFICANT CHANGE UP (ref 3.8–10.5)
WBC # FLD AUTO: 6.18 K/UL — SIGNIFICANT CHANGE UP (ref 3.8–10.5)
WBC UR QL: >50

## 2018-07-31 RX ADMIN — Medication 81 MILLIGRAM(S): at 16:47

## 2018-07-31 RX ADMIN — ERYTHROPOIETIN 4000 UNIT(S): 10000 INJECTION, SOLUTION INTRAVENOUS; SUBCUTANEOUS at 14:45

## 2018-07-31 RX ADMIN — Medication 50 MILLIGRAM(S): at 05:37

## 2018-07-31 RX ADMIN — CHOLESTYRAMINE 4 GRAM(S): 4 POWDER, FOR SUSPENSION ORAL at 07:59

## 2018-07-31 RX ADMIN — HEPARIN SODIUM 5000 UNIT(S): 5000 INJECTION INTRAVENOUS; SUBCUTANEOUS at 05:37

## 2018-07-31 RX ADMIN — Medication 100 MILLIGRAM(S): at 16:49

## 2018-07-31 RX ADMIN — HYDROMORPHONE HYDROCHLORIDE 0.5 MILLIGRAM(S): 2 INJECTION INTRAMUSCULAR; INTRAVENOUS; SUBCUTANEOUS at 17:24

## 2018-07-31 RX ADMIN — Medication 5 MILLIGRAM(S): at 05:36

## 2018-07-31 RX ADMIN — FINASTERIDE 5 MILLIGRAM(S): 5 TABLET, FILM COATED ORAL at 16:45

## 2018-07-31 RX ADMIN — PANTOPRAZOLE SODIUM 40 MILLIGRAM(S): 20 TABLET, DELAYED RELEASE ORAL at 16:46

## 2018-07-31 RX ADMIN — Medication 500 MILLIGRAM(S): at 05:37

## 2018-07-31 RX ADMIN — ERYTHROPOIETIN 3000 UNIT(S): 10000 INJECTION, SOLUTION INTRAVENOUS; SUBCUTANEOUS at 14:51

## 2018-07-31 RX ADMIN — Medication 0.5 MILLIGRAM(S): at 19:58

## 2018-07-31 RX ADMIN — Medication 50 MILLIGRAM(S): at 22:23

## 2018-07-31 RX ADMIN — Medication 500 MILLIGRAM(S): at 16:52

## 2018-07-31 RX ADMIN — Medication 8 MILLIGRAM(S): at 22:24

## 2018-07-31 RX ADMIN — Medication 125 MILLIGRAM(S): at 14:46

## 2018-07-31 RX ADMIN — HEPARIN SODIUM 5000 UNIT(S): 5000 INJECTION INTRAVENOUS; SUBCUTANEOUS at 16:47

## 2018-07-31 RX ADMIN — CHOLESTYRAMINE 4 GRAM(S): 4 POWDER, FOR SUSPENSION ORAL at 22:24

## 2018-07-31 RX ADMIN — Medication 5 MILLIGRAM(S): at 22:23

## 2018-07-31 RX ADMIN — Medication 500 MILLIGRAM(S): at 23:05

## 2018-07-31 RX ADMIN — PANTOPRAZOLE SODIUM 40 MILLIGRAM(S): 20 TABLET, DELAYED RELEASE ORAL at 05:37

## 2018-07-31 RX ADMIN — SIMVASTATIN 10 MILLIGRAM(S): 20 TABLET, FILM COATED ORAL at 22:23

## 2018-07-31 NOTE — PROGRESS NOTE ADULT - ASSESSMENT
82 y/o wm with hx of ckd stage 3 ( scr 1.5-1.7) with ARYA due to volume depletion from CDiff associated colitis and diarrhea in presence of diuretic use PTA.  Now with non anion gap metabolic acidosis and worsening renal parameters.  CXR with mild CHF with hx of diastolic CHF.    PLAN  - obtain abd and lactate  - will change ivf and add bicarbonate and keep at current rate  - hold lasix done.   - fu uop and scr closely and will monitor respiratory status  - bp and hr stable now, cardizem adjusted and lopressor added    7/9 MK  - ARYA: worsening renal parameters with metabolic acidosis, non ag.  Previous lactate WNL and since placed on bicarbonate drip  fu repeat abg today.  Increase IVF rate  possibility of HD discussed with enio, hcp daughter, pt has been agreeable in past.   DC hydralazine  dc morphine and change to dilaudid  - Cdiff with rising scr and worsening abd distension: CRS consult ongoing  possible repeat ct scan  DW Dr ballesteros    7/10  Anuric  anasarca  Non anion gap acidosis on bicarb drip  ARYA, anuric  CKD 3 history  d/w Family, and pt agreeable  called ICU for line placement and to dialyze the pt in ICU for monitoring    7/10  HD initiated via R temp HD catheter  tolerating well  no complaints  good abf    7/11 SY  --ARYA with Anasarca.  Urine output slightly improved.  However, remains quite fluid overloaded.  Will plan to continue HD until fluid status is much improved.  HD order written.  3 hour tx today.  Will aim to remove 2.5 kg as tolerated.  --C DIff colitis ; continue to monitor closely.    7/12 SY  --ARYA with Anasarca.   Remains Oligo-anuric.    --HD with goal of 2.5 kg UF again today.  --C Diff colitis.   Clinically improved   Continue to monitor.    7/13  The patient was dialyzed 3 days in a row this week  Discussed with the patient's daughter and hospitalist, intensivist  Will skip dialysis today, no urgent need for dialysis  We'll dialyze the patient tomorrow on Saturday and then skip Sunday to dialyze the patient again on Monday.    7/14  We'll dialyze against 4 K bath  Case discussed with the patient's daughter  May need a permanent catheter by Monday      7/15  Dialyze with a 4K bath  Potassium is 3.3 most likely from the diarrhea  Schedule for permacath placement after dialysis on Monday or Tuesday  Patient is comfortable no complications noted at this time    7/16 MK  - ARYA/ CKD stage 3 remains oliguirc and HD dependent.  LImited UF at HD toleated with the   hypokalemia corrected with HD.  Permcath planned today  - Cdiff: on PO vanc.  improved leukocytosis and abd distension     7/17  s/p perm cath  HD tomorrow  4 K bath  replace K   overall stable    7/18 SY  --ARYA/CKD for now remaining dialysis dependent.  Continue TIW HD.  --C Diff colitis ; improving clinically.    7/19 SY  --ARYA/CKD : Continue TIW HD.   Will continue as outpatient for now as no signs of recovery at this point.  --C Diff colitis  : clinically improving.  Continue po abtx.  --D/c Planning  D/w pt's daughter re : rehab with HD.  --Replete K.  Continue regular diet without restrictions for now.    7/20 MK  - ARYA/CKD remains HD dependent with oliguria.  Will attempt UF with HD.  K correction with HD and changed to regular diet with fluid restriction  - Cdiff: PO vanc with improving renal paramenters-  - Episodes of Desat: will have pulmonary re-eval prior to dc.  - DC planning rehab with dialysis  dw Dr Andrade  will see pt    7/21 MK  - ARYA/CKD remains hd dependent. post hd cxr with improved PVC.  Still with episodes of desaturation and dr andrade input noted.  CT with evidence of ascites for paracentesis  - cdiff: on po vanc.  still with loose stools,     7/22 MK  - ARYA/CKD remains HD dependent.  Will coordinate AM HD based upon timing of paracentesis, hypokalemia improved  - hypoxia with ascites: for paracentesis   - cdif on po vanc, rectal tube in place    7/23 MK  - ARYA/CKD remains HD dependent, toelrating hd.  + inc uop   - hypoxia with ascites: s/p paracentesis today, monitor response to oxygenation  - AOCD; fu trend of h/h, on epogen  - cdiff: po vanc    7/24 SY  --ARYA/CKD  Urine output may be increasing.  Follow creat trend to determine further dialysis support.  --Post Paracentesis : improve resp status.  --Anemia : continue LETICIA.  --C Diff : continue po Vanco.    7/25 SY  --ARYA/CKD  Monitor urine output.  Noted with increased creat.  Will maintain on TIW HD since fluid overloaded state is persistent.  --Monitor GI function.  --Post Paracentesis : resp status stabilized.  --Anemia :continue LETICIA.    7/26 SY  --ARYA/CKD  : Creat slowly  trending down.  Urine output not recorded.  Check creat in am to determine whether HD can be held off.  --Recurrent abdominal pain of concern.  Being assessed by primary team and GI.  --Bladder scan to assure full void.    7/27 MK  - ARYA/CKD: with + inc Uop and significant volume depletion overnight.  Bladder scan of 77 ml with stable respiratory status.  NO Hd today and reassess in the AM.      7/28 SY  --ARYA /CKD : positive increase in urine output. However, renal parameters remain elevated.  Therefore, will maintain on TIW HD schedule for now.  --Pt's fluid status has much improved.   Will HD today with no UF and allow increased fluid intake without restrictions.  --C DIff : continue to monitor and continue po abtx.    7/29 SY  --ARYA/CKD   post HD yesterday.   Continue to monitor for signs of renal recovery.  --Fluid overloaded state much improved.  --Monitor on regular diet.  --C Diff : continue po abtx.    7/30 MK  - ARYA/CKD, with stable renal parameters Unclear if with increasing UOP.  Will hold off on HD tomorrow and assess in AM  - Cloudy urine with white sputum production: fu with ua and urine cultures    7/31  minimal cloudy urine   creat up to 4.88  Abd distended \examined w Dr zendejas  Will dialyze today for fluid removal as tolerated, 4 K bath  May need paracentesis as per Dr Zendejas

## 2018-07-31 NOTE — PROGRESS NOTE ADULT - SUBJECTIVE AND OBJECTIVE BOX
Patient is a 83y old  Male who presents with a chief complaint of Fever/Diarrhea (20 Jul 2018 13:30)      HPI:  Pt is a 84 y/o M w/pmhx of Afib, CHF, CAD s/p stents, COPD, PNA, upper GI bleed (duodenal)  BIB EMS from [facility] living for fever, abd pain, and diarrhea that began 3 weeks ago. Was in the hospital for PNA tx and was later dx with C-diff and started on a course of PO vancomycin for 10 days for the C-diff. States that he has had diarrhea since before the course of abx. Pain has been increased for the past few weeks and is characterized as a cramping feeling. Daughters also note that there is increased distension. Also notes chest pain characterized as a cramping in the central chest. 83% O2 sat noted this morning at the assisted living facility. Takes O2 routinely at night but not during the day. (06 Jul 2018 23:55)  Patient fatigue. Diarrhea is improving per patient. Maybe a little bit more formed. Increased right lower quadrant pain last night but now has resolved.  Negative nausea or vomiting. Tolerating by mouth.  Pain in the right lower quadrant was crampy and crescendo but has resolved. He will be followed for this.  There was no radiation with pain, 5 out of 10,    PAST MEDICAL & SURGICAL HISTORY:  Diastolic CHF  Peripheral vascular disease  Afib  Anemia  CKD (chronic kidney disease)  COPD (chronic obstructive pulmonary disease)  SHAYY (obstructive sleep apnea)  Sepsis, due to unspecified organism: 2/2 poorly healing wounds b/l  Dyspepsia: On moderate exertion.  Sleep apnea, obstructive: Requires home 02 therapy, and treatment with BIPAP  Atelectasis  Pleural effusion, bilateral  Respiratory failure  Peripheral edema  CRI (chronic renal insufficiency)  Gout  Benign prostatic hypertrophy  Spinal stenosis  Hypercholesterolemia  GERD (gastroesophageal reflux disease)  CAD (coronary artery disease)  Hypertension  S/P angioplasty with stent  Cataract of left eye  Prostate: Surgery green light procedure.  S/P rotator cuff surgery: Right  S/P angioplasty      MEDICATIONS  (STANDING):  ALBUTerol/ipratropium for Nebulization. 3 milliLiter(s) Nebulizer once  allopurinol 100 milliGRAM(s) Oral daily  aspirin  chewable 81 milliGRAM(s) Oral daily  buDESOnide   0.5 milliGRAM(s) Respule 0.5 milliGRAM(s) Inhalation two times a day  cholestyramine Powder (Sugar-Free) 4 Gram(s) Oral two times a day  diltiazem    Tablet 30 milliGRAM(s) Oral every 6 hours  doxazosin 8 milliGRAM(s) Oral at bedtime  epoetin nelly Injectable 3000 Unit(s) IV Push <User Schedule>  epoetin nelly Injectable 4000 Unit(s) IV Push <User Schedule>  finasteride 5 milliGRAM(s) Oral daily  heparin  Injectable 5000 Unit(s) SubCutaneous every 12 hours  metoclopramide 5 milliGRAM(s) Oral three times a day  metroNIDAZOLE  IVPB 250 milliGRAM(s) IV Intermittent every 8 hours  pantoprazole    Tablet 40 milliGRAM(s) Oral every 12 hours  simvastatin 10 milliGRAM(s) Oral at bedtime  sodium ferric gluconate complex Injectable 125 milliGRAM(s) IV Push <User Schedule>  vancomycin    Solution 500 milliGRAM(s) Oral every 6 hours    MEDICATIONS  (PRN):  acetaminophen   Tablet 650 milliGRAM(s) Oral every 8 hours PRN For Temp greater than 38 C (100.4 F)  acetaminophen   Tablet. 650 milliGRAM(s) Oral every 8 hours PRN Mild Pain (1 - 3)  albumin human 25% IVPB 50 milliLiter(s) IV Intermittent <User Schedule> PRN sbp<=120mmhg  ALBUTerol   0.5% 2.5 milliGRAM(s) Nebulizer every 4 hours PRN Shortness of Breath and/or Wheezing  diphenhydrAMINE   Capsule 25 milliGRAM(s) Oral at bedtime PRN sleep  fluticasone propionate 50 MICROgram(s)/spray Nasal Spray 1 Spray(s) Both Nostrils two times a day PRN runny nose  HYDROmorphone  Injectable 0.5 milliGRAM(s) IV Push every 6 hours PRN Severe Pain (7 - 10)  ondansetron Injectable 4 milliGRAM(s) IV Push every 6 hours PRN Nausea and/or Vomiting      Allergies    Zosyn (Rash)    Intolerances        SOCIAL HISTORY:NC    FAMILY HISTORY:  No pertinent family history in first degree relatives      REVIEW OF SYSTEMS:    CONSTITUTIONAL: No weakness, fevers or chills  EYES/ENT: No visual changes;  No vertigo or throat pain   NECK: No pain or stiffness  RESPIRATORY: No cough, wheezing, hemoptysis; No shortness of breath  CARDIOVASCULAR: No chest pain or palpitations  GENITOURINARY: No dysuria, frequency or hematuria  NEUROLOGICAL: No numbness or weakness  SKIN: No itching, burning, rashes, or lesions   All other review of systems is negative unless indicated above.    Vital Signs Last 24 Hrs  T(C): 36.6 (31 Jul 2018 05:30), Max: 37 (30 Jul 2018 23:15)  T(F): 97.9 (31 Jul 2018 05:30), Max: 98.6 (30 Jul 2018 23:15)  HR: 69 (31 Jul 2018 05:30) (69 - 79)  BP: 138/49 (31 Jul 2018 05:30) (121/69 - 138/49)  BP(mean): --  RR: 18 (31 Jul 2018 05:30) (18 - 18)  SpO2: 93% (31 Jul 2018 05:30) (93% - 100%)    PHYSICAL EXAM:    Constitutional: NAD, well-developed  HEENT: EOMI, throat clear  Neck: No LAD, supple  Respiratory: CTA and P  Cardiovascular: S1 and S2, RRR, no M  Gastrointestinal: BS+, soft, mild RLQ tend/ND, neg HSM,  Extremities: No peripheral edema, neg clubing, cyanosis    Neurological: A/O x 3, no focal deficits  Psychiatric: Normal mood, normal affect  Skin: No rashes    LABS:  CBC Full  -  ( 31 Jul 2018 07:07 )  WBC Count : 6.18 K/uL  Hemoglobin : 7.8 g/dL  Hematocrit : 25.6 %  Platelet Count - Automated : 214 K/uL  Mean Cell Volume : 97.3 fl  Mean Cell Hemoglobin : 29.7 pg  Mean Cell Hemoglobin Concentration : 30.5 gm/dL  Auto Neutrophil # : x  Auto Lymphocyte # : x  Auto Monocyte # : x  Auto Eosinophil # : x  Auto Basophil # : x  Auto Neutrophil % : x  Auto Lymphocyte % : x  Auto Monocyte % : x  Auto Eosinophil % : x  Auto Basophil % : x    07-30    141  |  109<H>  |  37<H>  ----------------------------<  81  3.6   |  23  |  4.19<H>    Ca    7.3<L>      30 Jul 2018 05:52  Phos  3.3     07-30  Mg     1.8     07-30    TPro  x   /  Alb  2.2<L>  /  TBili  x   /  DBili  x   /  AST  x   /  ALT  x   /  AlkPhos  x   07-30            RADIOLOGY & ADDITIONAL STUDIES:

## 2018-07-31 NOTE — PROGRESS NOTE ADULT - PROBLEM SELECTOR PLAN 1
Cont vanc 500 mg q6H -Will eventually taper to 3 times a day for 2 weeks, then twice a day for one week, then daily for one week, then every other day for 1 week, then every 3rd day for 1 week.  Cont flagyl  mg q8h (restarted on 7/27)  ID recs: limit abx exposure, cont current abx regimen, contact isolation, do not add new abx for cystitis  GI recs: cont current abx for now. Hold a/c due to risk of bleeding.

## 2018-07-31 NOTE — PROGRESS NOTE ADULT - SUBJECTIVE AND OBJECTIVE BOX
83M w/ PMH of Afib, CHF, CAD s/p stents, COPD, recent PNA on abx, upper GI bleed (duodenal)  BIB EMS from [facility] for fever, abd pain, and diarrhea that began 3 weeks ago. Was in the hospital for PNA tx and was later dx with C-diff and started on a course of PO vancomycin for 10 days for the C-diff. States that he has had diarrhea since before the course of abx. Pain has been increased for the past few weeks and is characterized as a cramping feeling. Daughters also note that there is increased distension. Also notes chest pain characterized as a cramping in the central chest. 83% O2 sat noted morning of admission on 18, at the assisted living facility. Takes O2 routinely at night but not during the day.    18: Pt seen and evaluated. Pt reports feeling better. Abdominal pain minimal. Diarrhea is resolving and becoming less watery. Complained of having runny nose, clear. No other complaints.     REVIEW OF SYSTEMS:  CONSTITUTIONAL: No weakness, fevers or chills  EYES/ENT: No visual changes;  No vertigo or throat pain   NECK: No pain or stiffness  RESPIRATORY: No cough, wheezing, hemoptysis; No shortness of breath  CARDIOVASCULAR: No chest pain or palpitations  GASTROINTESTINAL: No abdominal or epigastric pain. No nausea, vomiting, or hematemesis; No diarrhea or constipation. No melena or hematochezia.  GENITOURINARY: No dysuria, frequency or hematuria  NEUROLOGICAL: No numbness or weakness  SKIN: No itching, burning, rashes, or lesions   All other review of systems is negative unless indicated above    Vital Signs Last 24 Hrs  T(C): 36.6 (18 @ 15:47)  T(F): 97.8 (18 @ 15:47), Max: 98.6 (18 @ 23:15)  HR: 95 (18 @ 15:47) (69 - 99)  BP: 114/47 (18 @ 15:47)  BP(mean): --  RR: 17 (18 @ 15:47) (16 - 18)  SpO2: 95% (18 @ 12:14) (93% - 100%)  Wt(kg): --    PHYSICAL EXAM:  Constitutional: NAD, awake and alert, well-developed  Respiratory: Breath sounds are clear bilaterally, No wheezing, rales or rhonchi  Cardiovascular: S1 and S2, regular rate and rhythm, no Murmurs, gallops or rubs  Gastrointestinal: Bowel Sounds present, soft, nontender, nondistended, no guarding, no rebound  Extremities: No peripheral edema  Vascular: 2+ peripheral pulses  Neurological: A/O x 3, no focal deficits  Skin: No rashes    Lab Results:                                            7.8                   Neurophils% (auto):   x      ( @ 07:07):    6.18 )-----------(214          Lymphocytes% (auto):  x                                             25.6                   Eosinphils% (auto):   x        Manual%: Neutrophils x    ; Lymphocytes x    ; Eosinophils x    ; Bands%: x    ; Blasts x         Differential:	[] Automated		[] Manual        141  |  111<H>  |  47<H>  ----------------------------<  78  3.6   |  22  |  4.88<H>    Ca    7.5<L>      2018 07:07  Phos  3.8       Mg     1.9         TPro  x   /  Alb  2.2<L>  /  TBili  x   /  DBili  x   /  AST  x   /  ALT  x   /  AlkPhos  x         Urinalysis Basic - ( 2018 06:00 )    Color: Yellow / Appearance: very cloudy / S.015 / pH: x  Gluc: x / Ketone: Trace  / Bili: Negative / Urobili: Negative mg/dL   Blood: x / Protein: 500 mg/dL / Nitrite: Negative   Leuk Esterase: Moderate / RBC: 3-5 /HPF / WBC >50   Sq Epi: x / Non Sq Epi: Occasional / Bacteria: Few    IMAGING    < from: CT Abdomen and Pelvis No Cont (18 @ 11:15) >  IMPRESSION:     Stable appearance of pancolitis consistent with pseudomembranous colitis.   No pneumatosis or free air.    Increasing moderate ascites.    Anasarca.    Urinary bladder cystitis. Correlate with UA.      < end of copied text >

## 2018-07-31 NOTE — PROGRESS NOTE ADULT - SUBJECTIVE AND OBJECTIVE BOX
CHIEF COMPLAINT: Diarrhea and fever *3 weeks    SUBJECTIVE:   HPI: This is a 84 y/o M w/pmhx of Afib, CHF, CAD s/p stents, COPD, recent PNA on abx, upper GI bleed (duodenal)  BIB EMS from [facility] for fever, abd pain, and diarrhea that began 3 weeks ago. Was in the hospital for PNA tx and was later dx with C-diff and started on a course of PO vancomycin for 10 days for the C-diff. States that he has had diarrhea since before the course of abx. Pain has been increased for the past few weeks and is characterized as a cramping feeling. Daughters also note that there is increased distension. Also notes chest pain characterized as a cramping in the central chest. 83% O2 sat noted morning of admission on 18, at the assisted living facility. Takes O2 routinely at night but not during the day.      18:  Pt seen and evaluated. Pt reports feeling better. Abdominal pain minimal. Diarrhea is resolving and becoming less watery. Complained of having runny nose, clear. No other complaints.     :   patient doing clinically better. anticipated HD today.     REVIEW OF SYSTEMS:  CONSTITUTIONAL: No weakness, fevers or chills  EYES/ENT: No visual changes;  No vertigo or throat pain   NECK: No pain or stiffness  RESPIRATORY: No cough, wheezing, hemoptysis; No shortness of breath  CARDIOVASCULAR: No chest pain or palpitations  GASTROINTESTINAL: No abdominal or epigastric pain. No nausea, vomiting, or hematemesis; No diarrhea or constipation. No melena or hematochezia.  GENITOURINARY: No dysuria, frequency or hematuria  NEUROLOGICAL: No numbness or weakness  SKIN: No itching, burning, rashes, or lesions   All other review of systems is negative unless indicated above      PHYSICAL EXAM:  ICU Vital Signs Last 24 Hrs  T(C): 37 (2018 12:21), Max: 37 (2018 23:15)  T(F): 98.6 (2018 12:21), Max: 98.6 (2018 23:15)  HR: 78 (2018 15:10) (69 - 99)  BP: 138/46 (2018 15:10) (113/51 - 140/67)  RR: 16 (2018 13:25) (16 - 18)  SpO2: 95% (2018 12:14) (93% - 100%)    Constitutional: very deconditioned, frail  Respiratory: Breath sounds are clear bilaterally, No wheezing, rales or rhonchi  Cardiovascular: S1 and S2, regular rate and rhythm, no Murmurs, gallops or rubs  Gastrointestinal: abdomen soft, distended, mildly tender  Extremities: (R) lower extremity amputation  Vascular: 2+ peripheral pulses  Neurological: A/O x 3, no focal deficits  Skin: No rashes      LABS:       CBC Full  -  ( 2018 07:07 )  WBC Count : 6.18 K/uL  Hemoglobin : 7.8 g/dL  Hematocrit : 25.6 %  Platelet Count - Automated : 214 K/uL    Urinalysis Basic - ( 2018 06:00 )    Color: Yellow / Appearance: very cloudy / S.015 / pH: x  Gluc: x / Ketone: Trace  / Bili: Negative / Urobili: Negative mg/dL   Blood: x / Protein: 500 mg/dL / Nitrite: Negative   Leuk Esterase: Moderate / RBC: 3-5 /HPF / WBC >50   Sq Epi: x / Non Sq Epi: Occasional / Bacteria: Few    141  |  111<H>  |  47<H>  ----------------------------<  78  3.6   |  22  |  4.88<H>    Ca    7.5<L>      2018 07:07  Phos  3.8       Mg     1.9         TPro  x   /  Alb  2.2<L>  /  TBili  x   /  DBili  x   /  AST  x   /  ALT  x   /  AlkPhos  x

## 2018-07-31 NOTE — PROGRESS NOTE ADULT - ASSESSMENT
c diff colitis  By mouth vancomycin, high dose   case discussed with  family  Patient with continued abdominal pain and possible inc diarrhea, discussed with ID,  increased vanco to 500 4 times a day and add IV Flagyl.  improving slowly  possible UTI noted and if tx needed, it will make c diff tx more difficult, await eval              A fibrillation    Would hold anticoagulation secondary to history bleeding and colitis    COPD obesity, obstructive sleep apnea, coronary artery disease, overall comorbid risk factors     IVF per renal  HD as needed      acites, SP paracentesis

## 2018-07-31 NOTE — PROGRESS NOTE ADULT - PROBLEM SELECTOR PLAN 3
- s/p Tunnel Cath on 7/16/18, patient currently HD dependent  - Nephro consult appreciated: cont HD, Creatinine worsened past couple days

## 2018-07-31 NOTE — PROGRESS NOTE ADULT - ASSESSMENT
83M w/ PMH of Afib, CHF, CAD s/p stents, COPD, recent PNA on abx, upper GI bleed (duodenal) was admitted for recurrent c. diff colitis.

## 2018-07-31 NOTE — PROGRESS NOTE ADULT - PROBLEM SELECTOR PLAN 2
- continue to maintain fluid balance with HD MWF   - control HFpEF with medical management   - O2 as needed  - Pulm appreciated, add nebs  - Incentive Spirometry  - Moderate Ascites - contributing to desaturation; s/p Paracentesis 7/23/18 w/ 2.8 L fluid removed, improved respiratory status   - 4L NC O2 at night and on exertion as needed  - Hourizadeh appreciated, no longer needs BIPAP.

## 2018-07-31 NOTE — PROGRESS NOTE ADULT - SUBJECTIVE AND OBJECTIVE BOX
NEPHROLOGY INTERVAL HPI/OVERNIGHT EVENTS:    7/31  feels well  awake alert  last HD on saturday 7/28  creat up to 4.88 today  urine cloudy      MEDICATIONS  (STANDING):  ALBUTerol/ipratropium for Nebulization. 3 milliLiter(s) Nebulizer once  allopurinol 100 milliGRAM(s) Oral daily  aspirin  chewable 81 milliGRAM(s) Oral daily  buDESOnide   0.5 milliGRAM(s) Respule 0.5 milliGRAM(s) Inhalation two times a day  cholestyramine Powder (Sugar-Free) 4 Gram(s) Oral two times a day  diltiazem    Tablet 30 milliGRAM(s) Oral every 6 hours  doxazosin 8 milliGRAM(s) Oral at bedtime  epoetin nelly Injectable 3000 Unit(s) IV Push <User Schedule>  epoetin nelly Injectable 4000 Unit(s) IV Push <User Schedule>  finasteride 5 milliGRAM(s) Oral daily  heparin  Injectable 5000 Unit(s) SubCutaneous every 12 hours  metoclopramide 5 milliGRAM(s) Oral three times a day  metroNIDAZOLE  IVPB 250 milliGRAM(s) IV Intermittent every 8 hours  pantoprazole    Tablet 40 milliGRAM(s) Oral every 12 hours  simvastatin 10 milliGRAM(s) Oral at bedtime  sodium ferric gluconate complex Injectable 125 milliGRAM(s) IV Push <User Schedule>  vancomycin    Solution 500 milliGRAM(s) Oral every 6 hours    MEDICATIONS  (PRN):  acetaminophen   Tablet 650 milliGRAM(s) Oral every 8 hours PRN For Temp greater than 38 C (100.4 F)  acetaminophen   Tablet. 650 milliGRAM(s) Oral every 8 hours PRN Mild Pain (1 - 3)  albumin human 25% IVPB 50 milliLiter(s) IV Intermittent <User Schedule> PRN sbp<=120mmhg  ALBUTerol   0.5% 2.5 milliGRAM(s) Nebulizer every 4 hours PRN Shortness of Breath and/or Wheezing  diphenhydrAMINE   Capsule 25 milliGRAM(s) Oral at bedtime PRN sleep  fluticasone propionate 50 MICROgram(s)/spray Nasal Spray 1 Spray(s) Both Nostrils two times a day PRN runny nose  HYDROmorphone  Injectable 0.5 milliGRAM(s) IV Push every 6 hours PRN Severe Pain (7 - 10)  ondansetron Injectable 4 milliGRAM(s) IV Push every 6 hours PRN Nausea and/or Vomiting      Allergies    Zosyn (Rash)    Intolerances    I&O's Detail        ICU Vital Signs Last 24 Hrs  T(C): 36.6 (31 Jul 2018 05:30), Max: 37 (30 Jul 2018 23:15)  T(F): 97.9 (31 Jul 2018 05:30), Max: 98.6 (30 Jul 2018 23:15)  HR: 69 (31 Jul 2018 05:30) (69 - 79)  BP: 138/49 (31 Jul 2018 05:30) (121/69 - 138/49)  BP(mean): --  ABP: --  ABP(mean): --  RR: 18 (31 Jul 2018 05:30) (18 - 18)  SpO2: 93% (31 Jul 2018 05:30) (93% - 100%)    PHYSICAL EXAM:  General: alert. awake Ox3  HEENT: MMM  CV: s1s2 rrr  LUNGS: B/L CTA  EXT: no edema    LABS:                                   7.8    6.18  )-----------( 214      ( 31 Jul 2018 07:07 )             25.6       07-31    141  |  111<H>  |  47<H>  ----------------------------<  78  3.6   |  22  |  4.88<H>    Ca    7.5<L>      31 Jul 2018 07:07  Phos  3.8     07-31  Mg     1.9     07-31    TPro  x   /  Alb  2.2<L>  /  TBili  x   /  DBili  x   /  AST  x   /  ALT  x   /  AlkPhos  x   07-31

## 2018-08-01 LAB
ANION GAP SERPL CALC-SCNC: 7 MMOL/L — SIGNIFICANT CHANGE UP (ref 5–17)
BUN SERPL-MCNC: 27 MG/DL — HIGH (ref 7–23)
CALCIUM SERPL-MCNC: 7.4 MG/DL — LOW (ref 8.5–10.1)
CHLORIDE SERPL-SCNC: 108 MMOL/L — SIGNIFICANT CHANGE UP (ref 96–108)
CO2 SERPL-SCNC: 26 MMOL/L — SIGNIFICANT CHANGE UP (ref 22–31)
CREAT SERPL-MCNC: 3.2 MG/DL — HIGH (ref 0.5–1.3)
CULTURE RESULTS: SIGNIFICANT CHANGE UP
GLUCOSE SERPL-MCNC: 85 MG/DL — SIGNIFICANT CHANGE UP (ref 70–99)
HCT VFR BLD CALC: 25.7 % — LOW (ref 39–50)
HGB BLD-MCNC: 7.8 G/DL — LOW (ref 13–17)
MAGNESIUM SERPL-MCNC: 1.7 MG/DL — SIGNIFICANT CHANGE UP (ref 1.6–2.6)
MCHC RBC-ENTMCNC: 29.7 PG — SIGNIFICANT CHANGE UP (ref 27–34)
MCHC RBC-ENTMCNC: 30.4 GM/DL — LOW (ref 32–36)
MCV RBC AUTO: 97.7 FL — SIGNIFICANT CHANGE UP (ref 80–100)
NRBC # BLD: 0 /100 WBCS — SIGNIFICANT CHANGE UP (ref 0–0)
PHOSPHATE SERPL-MCNC: 2.8 MG/DL — SIGNIFICANT CHANGE UP (ref 2.5–4.5)
PLATELET # BLD AUTO: 193 K/UL — SIGNIFICANT CHANGE UP (ref 150–400)
POTASSIUM SERPL-MCNC: 3.8 MMOL/L — SIGNIFICANT CHANGE UP (ref 3.5–5.3)
POTASSIUM SERPL-SCNC: 3.8 MMOL/L — SIGNIFICANT CHANGE UP (ref 3.5–5.3)
RBC # BLD: 2.63 M/UL — LOW (ref 4.2–5.8)
RBC # FLD: 19.5 % — HIGH (ref 10.3–14.5)
SODIUM SERPL-SCNC: 141 MMOL/L — SIGNIFICANT CHANGE UP (ref 135–145)
SPECIMEN SOURCE: SIGNIFICANT CHANGE UP
WBC # BLD: 5.51 K/UL — SIGNIFICANT CHANGE UP (ref 3.8–10.5)
WBC # FLD AUTO: 5.51 K/UL — SIGNIFICANT CHANGE UP (ref 3.8–10.5)

## 2018-08-01 PROCEDURE — 71045 X-RAY EXAM CHEST 1 VIEW: CPT | Mod: 26

## 2018-08-01 RX ADMIN — PANTOPRAZOLE SODIUM 40 MILLIGRAM(S): 20 TABLET, DELAYED RELEASE ORAL at 17:17

## 2018-08-01 RX ADMIN — SIMVASTATIN 10 MILLIGRAM(S): 20 TABLET, FILM COATED ORAL at 20:35

## 2018-08-01 RX ADMIN — Medication 5 MILLIGRAM(S): at 05:23

## 2018-08-01 RX ADMIN — HEPARIN SODIUM 5000 UNIT(S): 5000 INJECTION INTRAVENOUS; SUBCUTANEOUS at 17:18

## 2018-08-01 RX ADMIN — Medication 50 MILLIGRAM(S): at 14:17

## 2018-08-01 RX ADMIN — Medication 0.5 MILLIGRAM(S): at 19:49

## 2018-08-01 RX ADMIN — FINASTERIDE 5 MILLIGRAM(S): 5 TABLET, FILM COATED ORAL at 11:01

## 2018-08-01 RX ADMIN — HEPARIN SODIUM 5000 UNIT(S): 5000 INJECTION INTRAVENOUS; SUBCUTANEOUS at 05:23

## 2018-08-01 RX ADMIN — Medication 5 MILLIGRAM(S): at 20:35

## 2018-08-01 RX ADMIN — Medication 8 MILLIGRAM(S): at 20:35

## 2018-08-01 RX ADMIN — CHOLESTYRAMINE 4 GRAM(S): 4 POWDER, FOR SUSPENSION ORAL at 20:36

## 2018-08-01 RX ADMIN — Medication 50 MILLIGRAM(S): at 05:23

## 2018-08-01 RX ADMIN — Medication 81 MILLIGRAM(S): at 11:02

## 2018-08-01 RX ADMIN — CHOLESTYRAMINE 4 GRAM(S): 4 POWDER, FOR SUSPENSION ORAL at 08:45

## 2018-08-01 RX ADMIN — Medication 500 MILLIGRAM(S): at 17:17

## 2018-08-01 RX ADMIN — Medication 500 MILLIGRAM(S): at 23:38

## 2018-08-01 RX ADMIN — Medication 5 MILLIGRAM(S): at 11:02

## 2018-08-01 RX ADMIN — Medication 50 MILLIGRAM(S): at 20:34

## 2018-08-01 RX ADMIN — Medication 500 MILLIGRAM(S): at 05:23

## 2018-08-01 RX ADMIN — Medication 500 MILLIGRAM(S): at 11:01

## 2018-08-01 RX ADMIN — Medication 0.5 MILLIGRAM(S): at 08:00

## 2018-08-01 RX ADMIN — PANTOPRAZOLE SODIUM 40 MILLIGRAM(S): 20 TABLET, DELAYED RELEASE ORAL at 05:23

## 2018-08-01 RX ADMIN — Medication 100 MILLIGRAM(S): at 11:02

## 2018-08-01 NOTE — PROGRESS NOTE ADULT - SUBJECTIVE AND OBJECTIVE BOX
Patient is a 83y old  Male who presents with a chief complaint of complain of diarrhea, fever (06 Jul 2018 23:55)  Date of service: 08-01-18 @ 13:51        Patient notes 2 bowel movements over past 24 hours  Afebrile   Still with right lower quadrant pain    ROS: no fever or chills; denies dizziness, no HA, no SOB or cough,, no  constipation; no dysuria, no urinary frequency, no legs pain, no rashes    MEDICATIONS  (STANDING):  ALBUTerol/ipratropium for Nebulization. 3 milliLiter(s) Nebulizer once  allopurinol 100 milliGRAM(s) Oral daily  aspirin  chewable 81 milliGRAM(s) Oral daily  buDESOnide   0.5 milliGRAM(s) Respule 0.5 milliGRAM(s) Inhalation two times a day  cholestyramine Powder (Sugar-Free) 4 Gram(s) Oral two times a day  diltiazem    Tablet 30 milliGRAM(s) Oral every 6 hours  doxazosin 8 milliGRAM(s) Oral at bedtime  epoetin nelly Injectable 3000 Unit(s) IV Push <User Schedule>  epoetin nelly Injectable 4000 Unit(s) IV Push <User Schedule>  finasteride 5 milliGRAM(s) Oral daily  heparin  Injectable 5000 Unit(s) SubCutaneous every 12 hours  metoclopramide 5 milliGRAM(s) Oral three times a day  metroNIDAZOLE  IVPB 250 milliGRAM(s) IV Intermittent every 8 hours  pantoprazole    Tablet 40 milliGRAM(s) Oral every 12 hours  simvastatin 10 milliGRAM(s) Oral at bedtime  sodium ferric gluconate complex Injectable 125 milliGRAM(s) IV Push <User Schedule>  vancomycin    Solution 500 milliGRAM(s) Oral every 6 hours    MEDICATIONS  (PRN):  acetaminophen   Tablet 650 milliGRAM(s) Oral every 8 hours PRN For Temp greater than 38 C (100.4 F)  acetaminophen   Tablet. 650 milliGRAM(s) Oral every 8 hours PRN Mild Pain (1 - 3)  albumin human 25% IVPB 50 milliLiter(s) IV Intermittent <User Schedule> PRN sbp<=120mmhg  ALBUTerol   0.5% 2.5 milliGRAM(s) Nebulizer every 4 hours PRN Shortness of Breath and/or Wheezing  diphenhydrAMINE   Capsule 25 milliGRAM(s) Oral at bedtime PRN sleep  fluticasone propionate 50 MICROgram(s)/spray Nasal Spray 1 Spray(s) Both Nostrils two times a day PRN runny nose  HYDROmorphone  Injectable 0.5 milliGRAM(s) IV Push every 6 hours PRN Severe Pain (7 - 10)  ondansetron Injectable 4 milliGRAM(s) IV Push every 6 hours PRN Nausea and/or Vomiting      Vital Signs Last 24 Hrs  T(C): 36.4 (01 Aug 2018 11:14), Max: 36.7 (31 Jul 2018 21:34)  T(F): 97.6 (01 Aug 2018 11:14), Max: 98 (31 Jul 2018 21:34)  HR: 86 (01 Aug 2018 11:14) (73 - 99)  BP: 136/32 (01 Aug 2018 11:14) (113/32 - 158/47)  BP(mean): --  RR: 18 (01 Aug 2018 11:14) (16 - 18)  SpO2: 99% (01 Aug 2018 11:14) (97% - 99%)    Physical Exam:          Physical Exam:      PE:    Constitutional: frail looking  HEENT: NC/AT, EOMI, PERRLA, conjunctivae clear; ears and nose atraumatic; pharynx clear  Neck: supple; thyroid not palpable  Respiratory: respiratory effort normal; clear to auscultation  Cardiovascular: S1S2 regular, no murmurs  Abdomen: soft, right lower quadrant tender, difusely distended, positive BS; no liver or spleen organomegaly  Genitourinary: no suprapubic tenderness  Musculoskeletal: s/p right lower extremity amputation; left lower extremity with dressing in place  Neurological/ Psychiatric: AxOx3, judgement and insight normal;  moving all extremities  Skin: no rashes; no palpable lesions    Labs: all available labs reviewed                        Labs:           Labs:                        7.9    4.76  )-----------( 186      ( 29 Jul 2018 07:23 )             25.9     07-29    140  |  108  |  30<H>  ----------------------------<  87  3.6   |  25  |  3.57<H>    Ca    7.7<L>      29 Jul 2018 07:23  Phos  2.9     07-29  Mg     2.0     07-29    TPro  x   /  Alb  2.2<L>  /  TBili  x   /  DBili  x   /  AST  x   /  ALT  x   /  AlkPhos  x   07-29           Cultures:       Culture - Fungal, Body Fluid (collected 07-23-18 @ 09:00)  Source: Peritoneal Peritoneal Fluid  Preliminary Report (07-24-18 @ 07:53):    Testing in progress    Culture - Body Fluid with Gram Stain (collected 07-23-18 @ 09:00)  Source: Peritoneal Peritoneal Fluid  Gram Stain (07-23-18 @ 21:41):    polymorphonuclear leukocytes seen    No organisms seen    by cytocentrifuge  Final Report (07-28-18 @ 16:27):    No growth at 5 days    Culture - Acid Fast - Body Fluid w/Smear (collected 07-23-18 @ 09:00)  Source: Peritoneal Peritoneal Fluid               Clostridium difficile Toxin by PCR (07.06.18 @ 16:19)    C Diff by PCR Result: Detected    Clostridium difficile Toxin by PCR: The results of this test should be interpreted with consideration of all  clinical and laboratory findings. This test determines the presence of  the C. difficile tcdB gene at a given time and is not intended to  identify antibiotic associated disease or C. difficile infection without  clinical context. Successful treatment is based on the resolution of  clinical symptoms. This test should not be used as a "test of cure"  because C. difficile DNA will persist after successful treatment. Repeat  testing will not be permitted.    This test is performed on the BD MAX system using Real-Time PCR and  fluorogenic target-specific hybridization.        < from: CT Abdomen and Pelvis w/ Oral Cont (07.06.18 @ 18:03) >    IMPRESSION:     Severe pancolitis consistent with pseudomembranous colitis.    Mild pulmonary edema.    Small loculated right and trace layering left pleural effusions.      < end of copied text >    < from: CT Abdomen and Pelvis No Cont (07.21.18 @ 11:15) >    IMPRESSION:     Stable appearance of pancolitis consistent with pseudomembranous colitis.   No pneumatosis or free air.    Increasing moderate ascites.    Anasarca.    Urinary bladder cystitis. Correlate with UA.      < end of copied text >        Radiology: all available radiological tests reviewed    Advanced directives addressed: full resuscitation

## 2018-08-01 NOTE — PROGRESS NOTE ADULT - SUBJECTIVE AND OBJECTIVE BOX
CHIEF COMPLAINT: Diarrhea and fever* 3 weeks    SUBJECTIVE:   HPI: This is a 84 y/o M w/pmhx of Afib, CHF, CAD s/p stents, COPD, recent PNA on abx, upper GI bleed (duodenal)  BIB EMS from [facility] for fever, abd pain, and diarrhea that began 3 weeks ago. Was in the hospital for PNA tx and was later dx with C-diff and started on a course of PO vancomycin for 10 days for the C-diff. States that he has had diarrhea since before the course of abx. Pain has been increased for the past few weeks and is characterized as a cramping feeling. Daughters also note that there is increased distension. Also notes chest pain characterized as a cramping in the central chest. 83% O2 sat noted morning of admission on 7/6/18, at the assisted living facility. Takes O2 routinely at night but not during the day.      8/1/18:  Pt was seen and evaluated at bedside. Pt reports feeling better than yesterday. Pt report abdominal pain/cramps yesterday after HD. No acute events overnight. No stools overnight as well. Tolerated minimum food.     REVIEW OF SYSTEMS:  CONSTITUTIONAL: No weakness, fevers or chills  EYES/ENT: No visual changes;  No vertigo or throat pain   NECK: No pain or stiffness  RESPIRATORY: No cough, wheezing, hemoptysis; No shortness of breath  CARDIOVASCULAR: No chest pain or palpitations  GASTROINTESTINAL: No abdominal or epigastric pain. No nausea, vomiting, or hematemesis; No diarrhea or constipation. No melena or hematochezia.  GENITOURINARY: No dysuria, frequency or hematuria  NEUROLOGICAL: No numbness or weakness  SKIN: No itching, burning, rashes, or lesions   All other review of systems is negative unless indicated above    Vital Signs Last 24 Hrs  T(C): 36.5 (01 Aug 2018 06:18), Max: 37 (31 Jul 2018 12:21)  T(F): 97.7 (01 Aug 2018 06:18), Max: 98.6 (31 Jul 2018 12:21)  HR: 75 (01 Aug 2018 06:18) (73 - 99)  BP: 121/40 (01 Aug 2018 06:18) (113/32 - 158/47)  BP(mean): --  RR: 18 (01 Aug 2018 06:18) (16 - 18)  SpO2: 97% (01 Aug 2018 06:18) (95% - 97%)    I&O's Summary    31 Jul 2018 07:01  -  01 Aug 2018 07:00  --------------------------------------------------------  IN: 520 mL / OUT: 0 mL / NET: 520 mL        CAPILLARY BLOOD GLUCOSE          PHYSICAL EXAM:  Constitutional: NAD, awake and alert, well-developed  Neck: Soft and supple, No LAD, No JVD  Respiratory: Breath sounds are clear bilaterally, No wheezing, rales or rhonchi  Cardiovascular: S1 and S2, regular rate and rhythm, no Murmurs, gallops or rubs  Gastrointestinal: Bowel Sounds present, soft, tender right lower quadrant, distended abdomen. no guarding, no rebound  Extremities: No peripheral edema  Vascular: 2+ peripheral pulses  Neurological: A/O x 3, no focal deficits  Skin: No rashes    MEDICATIONS:  MEDICATIONS  (STANDING):  ALBUTerol/ipratropium for Nebulization. 3 milliLiter(s) Nebulizer once  allopurinol 100 milliGRAM(s) Oral daily  aspirin  chewable 81 milliGRAM(s) Oral daily  buDESOnide   0.5 milliGRAM(s) Respule 0.5 milliGRAM(s) Inhalation two times a day  cholestyramine Powder (Sugar-Free) 4 Gram(s) Oral two times a day  diltiazem    Tablet 30 milliGRAM(s) Oral every 6 hours  doxazosin 8 milliGRAM(s) Oral at bedtime  epoetin nelly Injectable 3000 Unit(s) IV Push <User Schedule>  epoetin nelly Injectable 4000 Unit(s) IV Push <User Schedule>  finasteride 5 milliGRAM(s) Oral daily  heparin  Injectable 5000 Unit(s) SubCutaneous every 12 hours  metoclopramide 5 milliGRAM(s) Oral three times a day  metroNIDAZOLE  IVPB 250 milliGRAM(s) IV Intermittent every 8 hours  pantoprazole    Tablet 40 milliGRAM(s) Oral every 12 hours  simvastatin 10 milliGRAM(s) Oral at bedtime  sodium ferric gluconate complex Injectable 125 milliGRAM(s) IV Push <User Schedule>  vancomycin    Solution 500 milliGRAM(s) Oral every 6 hours      LABS: All Labs Reviewed:                        7.8    5.51  )-----------( 193      ( 01 Aug 2018 06:58 )             25.7     08-01    141  |  108  |  27<H>  ----------------------------<  85  3.8   |  26  |  3.20<H>    Ca    7.4<L>      01 Aug 2018 06:58  Phos  2.8     08-01  Mg     1.7     08-01    TPro  x   /  Alb  2.2<L>  /  TBili  x   /  DBili  x   /  AST  x   /  ALT  x   /  AlkPhos  x   07-31          Blood Culture:     RADIOLOGY/EKG:    DVT PPX:    ADVANCED DIRECTIVE:    DISPOSITION:

## 2018-08-01 NOTE — PROGRESS NOTE ADULT - SUBJECTIVE AND OBJECTIVE BOX
Patient is a 83y old  Male who presents with a chief complaint of Fever/Diarrhea (20 Jul 2018 13:30)      HPI:  Pt is a 82 y/o M w/pmhx of Afib, CHF, CAD s/p stents, COPD, PNA, upper GI bleed (duodenal)  BIB EMS from [facility] living for fever, abd pain, and diarrhea that began 3 weeks ago. Was in the hospital for PNA tx and was later dx with C-diff and started on a course of PO vancomycin for 10 days for the C-diff. States that he has had diarrhea since before the course of abx. Pain has been increased for the past few weeks and is characterized as a cramping feeling. Daughters also note that there is increased distension. Also notes chest pain characterized as a cramping in the central chest. 83% O2 sat noted this morning at the assisted living facility. Takes O2 routinely at night but not during the day. (06 Jul 2018 23:55)    August 1, patient with mild right upper quadrant pain, improving. He had 3 bowel movements yesterday. Negative nausea vomiting. Negative chest pain.  During dialysis, he had some upper abdominal crampy pain with some reflux however, this has improved. Pain was without any radiation. He tolerated food yesterday.  Negative nausea vomiting per patient.        PAST MEDICAL & SURGICAL HISTORY:  Diastolic CHF  Peripheral vascular disease  Afib  Anemia  CKD (chronic kidney disease)  COPD (chronic obstructive pulmonary disease)  SHAYY (obstructive sleep apnea)  Sepsis, due to unspecified organism: 2/2 poorly healing wounds b/l  Dyspepsia: On moderate exertion.  Sleep apnea, obstructive: Requires home 02 therapy, and treatment with BIPAP  Atelectasis  Pleural effusion, bilateral  Respiratory failure  Peripheral edema  CRI (chronic renal insufficiency)  Gout  Benign prostatic hypertrophy  Spinal stenosis  Hypercholesterolemia  GERD (gastroesophageal reflux disease)  CAD (coronary artery disease)  Hypertension  S/P angioplasty with stent  Cataract of left eye  Prostate: Surgery green light procedure.  S/P rotator cuff surgery: Right  S/P angioplasty      MEDICATIONS  (STANDING):  ALBUTerol/ipratropium for Nebulization. 3 milliLiter(s) Nebulizer once  allopurinol 100 milliGRAM(s) Oral daily  aspirin  chewable 81 milliGRAM(s) Oral daily  buDESOnide   0.5 milliGRAM(s) Respule 0.5 milliGRAM(s) Inhalation two times a day  cholestyramine Powder (Sugar-Free) 4 Gram(s) Oral two times a day  diltiazem    Tablet 30 milliGRAM(s) Oral every 6 hours  doxazosin 8 milliGRAM(s) Oral at bedtime  epoetin nelly Injectable 3000 Unit(s) IV Push <User Schedule>  epoetin nelly Injectable 4000 Unit(s) IV Push <User Schedule>  finasteride 5 milliGRAM(s) Oral daily  heparin  Injectable 5000 Unit(s) SubCutaneous every 12 hours  metoclopramide 5 milliGRAM(s) Oral three times a day  metroNIDAZOLE  IVPB 250 milliGRAM(s) IV Intermittent every 8 hours  pantoprazole    Tablet 40 milliGRAM(s) Oral every 12 hours  simvastatin 10 milliGRAM(s) Oral at bedtime  sodium ferric gluconate complex Injectable 125 milliGRAM(s) IV Push <User Schedule>  vancomycin    Solution 500 milliGRAM(s) Oral every 6 hours    MEDICATIONS  (PRN):  acetaminophen   Tablet 650 milliGRAM(s) Oral every 8 hours PRN For Temp greater than 38 C (100.4 F)  acetaminophen   Tablet. 650 milliGRAM(s) Oral every 8 hours PRN Mild Pain (1 - 3)  albumin human 25% IVPB 50 milliLiter(s) IV Intermittent <User Schedule> PRN sbp<=120mmhg  ALBUTerol   0.5% 2.5 milliGRAM(s) Nebulizer every 4 hours PRN Shortness of Breath and/or Wheezing  diphenhydrAMINE   Capsule 25 milliGRAM(s) Oral at bedtime PRN sleep  fluticasone propionate 50 MICROgram(s)/spray Nasal Spray 1 Spray(s) Both Nostrils two times a day PRN runny nose  HYDROmorphone  Injectable 0.5 milliGRAM(s) IV Push every 6 hours PRN Severe Pain (7 - 10)  ondansetron Injectable 4 milliGRAM(s) IV Push every 6 hours PRN Nausea and/or Vomiting      Allergies    Zosyn (Rash)    Intolerances        SOCIAL HISTORY:NC    FAMILY HISTORY:  No pertinent family history in first degree relatives      REVIEW OF SYSTEMS:    CONSTITUTIONAL: No weakness, fevers or chills  EYES/ENT: No visual changes;  No vertigo or throat pain   NECK: No pain or stiffness  RESPIRATORY: No cough, wheezing, hemoptysis; No shortness of breath  CARDIOVASCULAR: No chest pain or palpitations  GENITOURINARY: No dysuria, frequency or hematuria  NEUROLOGICAL: No numbness or weakness  SKIN: No itching, burning, rashes, or lesions   All other review of systems is negative unless indicated above.    Vital Signs Last 24 Hrs  T(C): 36.5 (01 Aug 2018 06:18), Max: 37 (31 Jul 2018 12:21)  T(F): 97.7 (01 Aug 2018 06:18), Max: 98.6 (31 Jul 2018 12:21)  HR: 75 (01 Aug 2018 06:18) (73 - 99)  BP: 121/40 (01 Aug 2018 06:18) (113/32 - 158/47)  BP(mean): --  RR: 18 (01 Aug 2018 06:18) (16 - 18)  SpO2: 97% (01 Aug 2018 06:18) (95% - 97%)    PHYSICAL EXAM:    Constitutional: NAD, well-developed  HEENT: EOMI, throat clear  Neck: No LAD, supple  Respiratory: CTA and P  Cardiovascular: S1 and S2, RRR, no M  Gastrointestinal: BS+, soft, mild RLQ tend/ND, neg HSM,  Extremities: No peripheral edema, neg clubing, cyanosis  Vascular: 2+ peripheral pulses  Neurological: A/O x 3, no focal deficits  Psychiatric: Normal mood, normal affect  Skin: No rashes    LABS:  CBC Full  -  ( 01 Aug 2018 06:58 )  WBC Count : 5.51 K/uL  Hemoglobin : 7.8 g/dL  Hematocrit : 25.7 %  Platelet Count - Automated : 193 K/uL  Mean Cell Volume : 97.7 fl  Mean Cell Hemoglobin : 29.7 pg  Mean Cell Hemoglobin Concentration : 30.4 gm/dL  Auto Neutrophil # : x  Auto Lymphocyte # : x  Auto Monocyte # : x  Auto Eosinophil # : x  Auto Basophil # : x  Auto Neutrophil % : x  Auto Lymphocyte % : x  Auto Monocyte % : x  Auto Eosinophil % : x  Auto Basophil % : x    07-31    141  |  111<H>  |  47<H>  ----------------------------<  78  3.6   |  22  |  4.88<H>    Ca    7.5<L>      31 Jul 2018 07:07  Phos  3.8     07-31  Mg     1.9     07-31    TPro  x   /  Alb  2.2<L>  /  TBili  x   /  DBili  x   /  AST  x   /  ALT  x   /  AlkPhos  x   07-31            RADIOLOGY & ADDITIONAL STUDIES:

## 2018-08-01 NOTE — PROGRESS NOTE ADULT - SUBJECTIVE AND OBJECTIVE BOX
Patient is a 83y old  Male who presents with a chief complaint of complain of diarrhea, fever (06 Jul 2018 23:55)      HPI:  Pt is a 84 y/o M w/pmhx of Afib, CHF, CAD s/p stents, COPD, PNA, upper GI bleed (duodenal)  BIB EMS from [facility] assisted living for fever, abd pain, and diarrhea that began 3 weeks ago. Was in the hospital for PNA tx and was later dx with C-diff and started on a course of PO vancomycin for 10 days for the C-diff. States that he has had diarrhea since before the course of abx. Pain has been increased for the past few weeks and is characterized as a cramping feeling. Daughters also note that there is increased distension. Also notes chest pain characterized as a cramping in the central chest. 83% O2 sat noted this morning at the assisted living facility. Takes O2 routinely at night but not during the day. (06 Jul 2018 23:55)  Patient's daughter states he was treated for ESBL with antibiotics prior to this  7/10  seen and examined with his dter at bedside  hx of resp failure and intubation 2012 x 3-4 days  feels ok with breathing now  being evaluated for surgical interventions with pancolitis  possibility of post op need for vent support discussed witrh pt and his dtr  he wants to proceed understanding high risk for surgery due to compromised medical condition  hx SHAYY and treated with BIPAP in the past / stopped using it after wt loss  7/11  seen in ICU with dtr at bedside  data discussed with critical care RN  s/p Ross and hemodialysis  still with abd distention  family wants to wait on abd surgery given he is high risk  7/12  family at bedside updated  no cp  no difficulty breathing  intermittent wheezing  7/13  NGT is discontinued  dtr at bedside updated  no issue with breathing now  7/14  having HD  some po intake tolerated ok  dtr at bedside updated  no active pulm issues  7/15  resting comfortably  no distress  Gi and renal eval noted  7/16  feels better  having dialysis  awake and alert  po intake tolerated well  7/17  dtr at bedside  s/lauren tate placed right chest  no cp    7/21  I was asked by Dr Tati Tomas to re eval for reported episodes of desaturations  DTR at bedside  data discussed at length  pt's reported hypoxemic episodes after narcotics / while sleeping or having abd distention  he has known hx SHAYY / OHS and prior BIPAP 12/8 with O2 attached at home  this am after 35% VM was removed in his room, RA O2 sat 88%-93% range  no distress  no significant cough  occasional wheeze    7/22  feels the same  family updated at bedside  reported plan for paracentesis in am  vomited once  7/23  data rev with critical care RN at bedside this am  overnight desat required 35% VM, o2 sat this am 93%  no distress  sleeping  plan for paracentesis   7/24  seen on 5 [place] today  feels better  no issues with his breathing  s/p paracentesis  7/26  dtr at bedside updated today  no active pulm issues now  8/1  pt is seen and examined today  no active pulm sxs  no abd pain  awake and alert  PAST MEDICAL & SURGICAL HISTORY:  Diastolic CHF  Peripheral vascular disease  Afib  Anemia  CKD (chronic kidney disease)  COPD (chronic obstructive pulmonary disease)  SHAYY (obstructive sleep apnea)  Sepsis, due to unspecified organism: 2/2 poorly healing wounds b/l  Dyspepsia: On moderate exertion.  Sleep apnea, obstructive: Requires home 02 therapy, and treatment with BIPAP  Atelectasis  Pleural effusion, bilateral  Respiratory failure  Peripheral edema  CRI (chronic renal insufficiency)  Gout  Benign prostatic hypertrophy  Spinal stenosis  Hypercholesterolemia  GERD (gastroesophageal reflux disease)  CAD (coronary artery disease)  Hypertension  S/P angioplasty with stent  Cataract of left eye  Prostate: Surgery green light procedure.  S/P rotator cuff surgery: Right  S/P angioplasty    MEDICATIONS  (STANDING):  ALBUTerol/ipratropium for Nebulization. 3 milliLiter(s) Nebulizer once  allopurinol 100 milliGRAM(s) Oral daily  aspirin  chewable 81 milliGRAM(s) Oral daily  buDESOnide   0.5 milliGRAM(s) Respule 0.5 milliGRAM(s) Inhalation two times a day  cholestyramine Powder (Sugar-Free) 4 Gram(s) Oral two times a day  diltiazem    Tablet 30 milliGRAM(s) Oral every 6 hours  doxazosin 8 milliGRAM(s) Oral at bedtime  epoetin nelly Injectable 3000 Unit(s) IV Push <User Schedule>  epoetin nelly Injectable 4000 Unit(s) IV Push <User Schedule>  finasteride 5 milliGRAM(s) Oral daily  heparin  Injectable 5000 Unit(s) SubCutaneous every 12 hours  metoclopramide 5 milliGRAM(s) Oral three times a day  metroNIDAZOLE  IVPB 250 milliGRAM(s) IV Intermittent every 8 hours  pantoprazole    Tablet 40 milliGRAM(s) Oral every 12 hours  simvastatin 10 milliGRAM(s) Oral at bedtime  sodium ferric gluconate complex Injectable 125 milliGRAM(s) IV Push <User Schedule>  vancomycin    Solution 500 milliGRAM(s) Oral every 6 hours            MEDICATIONS  (PRN):  acetaminophen   Tablet 650 milliGRAM(s) Oral every 8 hours PRN For Temp greater than 38 C (100.4 F)  acetaminophen   Tablet. 650 milliGRAM(s) Oral every 8 hours PRN Mild Pain (1 - 3)  ALBUTerol   0.5% 2.5 milliGRAM(s) Nebulizer every 4 hours PRN Shortness of Breath and/or Wheezing  morphine  - Injectable 2 milliGRAM(s) IV Push every 6 hours PRN Severe Pain (7 - 10)  ondansetron Injectable 4 milliGRAM(s) IV Push every 6 hours PRN Nausea and/or Vomiting      FAMILY HISTORY:  No pertinent family history in first degree relatives    REVIEW OF SYSTEM:  abdominal pain, otherwise 12 point ROS negative     Vital Signs Last 24 Hrs  T(C): 36.5 (01 Aug 2018 06:18), Max: 37 (31 Jul 2018 12:21)  T(F): 97.7 (01 Aug 2018 06:18), Max: 98.6 (31 Jul 2018 12:21)  HR: 75 (01 Aug 2018 06:18) (73 - 99)  BP: 121/40 (01 Aug 2018 06:18) (113/32 - 158/47)  BP(mean): --  RR: 18 (01 Aug 2018 06:18) (16 - 18)  SpO2: 97% (01 Aug 2018 06:18) (95% - 97%)  PHYSICAL EXAM  General Appearance: cooperative, no acute distress,   HEENT: PERRL, conjunctiva clear, EOM's intact, non injected pharynx, no exudate, TM   normal  Neck: Supple, , no adenopathy, thyroid: not enlarged, no carotid bruit or JVD  Back: Symmetric, no  tenderness,no soft tissue tenderness  Lungs:IMPROVED Diminished bilaterally R>L with some dullness to percussion, mostly resolved  no wheezing  Heart: Regular rate and rhythm, S1, S2 normal, no murmur, rub or gallop  Abdomen:  non tender,  active bowel sounds, decreased Distention   Extremities: pos peripheral edema / Hx Right leg amputation  Skin: Skin color, texture normal, no rashes   Neurologic: Alert and oriented X3 , cranial nerves intact, sensory and motor normal,    ECG:    LABS:                          7.8    5.51  )-----------( 193      ( 01 Aug 2018 06:58 )             25.7   08-01    141  |  108  |  27<H>  ----------------------------<  85  3.8   |  26  |  3.20<H>    Ca    7.4<L>      01 Aug 2018 06:58  Phos  2.8     08-01  Mg     1.7     08-01    TPro  x   /  Alb  2.2<L>  /  TBili  x   /  DBili  x   /  AST  x   /  ALT  x   /  AlkPhos  x   07-31                          7.7    4.32  )-----------( 203      ( 24 Jul 2018 07:08 )             25.3   07-24    142  |  106  |  29<H>  ----------------------------<  89  3.6   |  27  |  3.46<H>    Ca    7.4<L>      24 Jul 2018 07:08  Phos  5.4     07-23  Mg     1.9     07-24    TPro  4.7<L>  /  Alb  2.3<L>  /  TBili  x   /  DBili  x   /  AST  x   /  ALT  x   /  AlkPhos  x   07-23                                   8.0    6.95  )-----------( 247      ( 22 Jul 2018 06:19 )             26.7   07-22    144  |  111<H>  |  33<H>  ----------------------------<  104<H>  4.1   |  25  |  4.10<H>    Ca    7.3<L>      22 Jul 2018 06:19  Mg     1.8     07-22    TPro  4.4<L>  /  Alb  2.1<L>  /  TBili  0.4  /  DBili  x   /  AST  7<L>  /  ALT  <6<L>  /  AlkPhos  48  07-22               7.5    9.09  )-----------( 225      ( 21 Jul 2018 06:08 )             24.5   07-21    139  |  107  |  24<H>  ----------------------------<  101<H>  3.2<L>   |  28  |  3.31<H>    Ca    7.5<L>      21 Jul 2018 06:08  Phos  4.1     07-20  Mg     1.9     07-21    TPro  x   /  Alb  2.1<L>  /  TBili  x   /  DBili  x   /  AST  x   /  ALT  x   /  AlkPhos  x   07-20                                   8.4    10.13 )-----------( 199      ( 17 Jul 2018 05:41 )             26.8   07-17    142  |  108  |  19  ----------------------------<  89  3.4<L>   |  25  |  3.02<H>    Ca    7.2<L>      17 Jul 2018 05:41                 8.4    8.91  )-----------( 181      ( 15 Jul 2018 06:16 )             26.4   07-15    142  |  107  |  23  ----------------------------<  101<H>  3.3<L>   |  26  |  3.04<H>    Ca    7.5<L>      15 Jul 2018 06:16                            8.2    10.15 )-----------( 183      ( 13 Jul 2018 05:14 )             26.1   07-13    143  |  107  |  26<H>  ----------------------------<  89  3.5   |  26  |  2.62<H>    Ca    7.2<L>      13 Jul 2018 05:14  Phos  4.3     07-12  Mg     2.0     07-12    TPro  x   /  Alb  2.0<L>  /  TBili  x   /  DBili  x   /  AST  x   /  ALT  x   /  AlkPhos  x   07-12                            8.3    10.62 )-----------( 202      ( 12 Jul 2018 05:17 )             26.8   07-12    141  |  107  |  33<H>  ----------------------------<  66<L>  3.9   |  23  |  2.84<H>    Ca    7.4<L>      12 Jul 2018 05:17  Phos  4.3     07-12  Mg     2.0     07-12    TPro  x   /  Alb  2.0<L>  /  TBili  x   /  DBili  x   /  AST  x   /  ALT  x   /  AlkPhos  x   07-12                          8.8    20.36 )-----------( 265      ( 10 Jul 2018 05:47 )             28.0   07-11    142  |  108  |  52<H>  ----------------------------<  80  4.0   |  23  |  3.82<H>    Ca    7.0<L>      11 Jul 2018 06:20  Phos  4.9     07-11  Mg     1.9     07-11                              8.1    20.78 )-----------( 239      ( 09 Jul 2018 05:48 )             25.6     07-09    144  |  114<H>  |  65<H>  ----------------------------<  113<H>  4.5   |  17<L>  |  3.90<H>    Ca    7.1<L>      09 Jul 2018 05:48  Mg     1.9     07-09      CARDIAC MARKERS ( 07 Jul 2018 17:51 )  0.024 ng/mL / x     / x     / x     / x                  ABG - ( 08 Jul 2018 15:47 )  pH, Arterial: 7.18  pH, Blood: x     /  pCO2: 39    /  pO2: 68    / HCO3: 14    / Base Excess: -13.1 /  SaO2: 92            < from: Xray Chest 1 View-PORTABLE IMMEDIATE (07.10.18 @ 15:50) >  INTERPRETATION:  CLINICAL INDICATION:  R  I J dialysis line placement    TECHNIQUE: Single view AP chest radiograph was performed.    COMPARISON:  Chest radiograph performed earlier on the same day,   7/10/2018 at 8:28 AM and chest radiograph dated 7/6/2018.    FINDINGS:    Interval placement of right-sided jugular central venous catheter with   tip projecting over the right atrium. No evidence forpneumothorax.    There is persistent mild pulmonary vascular congestion. There is trace   fluid within the right minor fissure.    The cardiac silhouette size is stable. Diffuse aortic calcifications are   noted    Redemonstration of anchor screw within the right humeral head.     IMPRESSION:    Interval placement of right-sided IJ CVC, with tip projecting over the   right atrium. No pneumothorax.     < end of copied text >          RADIOLOGY & ADDITIONAL STUDIES:  IMPRESSION:     Severe pancolitis consistent with pseudomembranous colitis.    Mild pulmonary edema.    Small loculated right and trace layering left pleural effusions.    < from: Xray Chest 1 View- PORTABLE-Routine (07.20.18 @ 15:52) >    PROCEDURE DATE:  07/20/2018          INTERPRETATION:  History: Acute on chronic kidney disease on   hemodialysis. Oxygen desaturation.    Single view of the chest was performed.    Comparison is made to July 16, 2018.    Findings:    There is a right-sided hemodialysis catheter in place tip at cavoatrial   junction. The lungs are clear. There is moderate improvement of   previously noted pulmonary vascular congestion. Heart size within normal   limits. Patient is status post right rotator cuff repair.    Impression:    Improvement of previously noted pulmonary vascular congestion.    < end of copied text >  LUNG BASES: Small bilateral pleural effusions. Loculated fluid versus   mucoid impaction at the right lung base.    IMPRESSION:     Stable appearance of pancolitis consistent with pseudomembranous colitis.   No pneumatosis or free air.    Increasing moderate ascites.    Anasarca.    Urinary bladder cystitis. Correlate with UA.    < from: Xray Chest 1 View- PORTABLE-Routine (07.20.18 @ 15:52) >  PROCEDURE DATE:  07/20/2018          INTERPRETATION:  History: Acute on chronic kidney disease on   hemodialysis. Oxygen desaturation.    Single view of the chest was performed.    Comparison is made to July 16, 2018.    Findings:    There is a right-sided hemodialysis catheter in place tip at cavoatrial   junction. The lungs are clear. There is moderate improvement of   previously noted pulmonary vascular congestion. Heart size within normal   limits. Patient is status post right rotator cuff repair.    Impression:    Improvement of previously noted pulmonary vascular congestion.    < end of copied text >

## 2018-08-01 NOTE — PROGRESS NOTE ADULT - ASSESSMENT
c diff colitis  By mouth vancomycin, high dose   case discussed with  family  Patient with continued abdominal pain and possible inc diarrhea, improving slowly      Vanco taper eventually. However At this time, would continue high-dose vancomycin with Flagyl. Is improving slowly on it.  Encourage by mouth intake, encourage physical therapy              A fibrillation    Would hold anticoagulation secondary to history bleeding and colitis    COPD obesity, obstructive sleep apnea, coronary artery disease, overall comorbid risk factors     IVF per renal  HD as needed      acites, SP paracentesis

## 2018-08-01 NOTE — PROGRESS NOTE ADULT - SUBJECTIVE AND OBJECTIVE BOX
NEPHROLOGY INTERVAL HPI/OVERNIGHT EVENTS:  no new c/o doing well  still with uop of 75-100ml   diarrhea 1x per day  appetite remains poor    MEDICATIONS  (STANDING):  ALBUTerol/ipratropium for Nebulization. 3 milliLiter(s) Nebulizer once  allopurinol 100 milliGRAM(s) Oral daily  aspirin  chewable 81 milliGRAM(s) Oral daily  buDESOnide   0.5 milliGRAM(s) Respule 0.5 milliGRAM(s) Inhalation two times a day  cholestyramine Powder (Sugar-Free) 4 Gram(s) Oral two times a day  diltiazem    Tablet 30 milliGRAM(s) Oral every 6 hours  doxazosin 8 milliGRAM(s) Oral at bedtime  epoetin nelly Injectable 3000 Unit(s) IV Push <User Schedule>  epoetin nelly Injectable 4000 Unit(s) IV Push <User Schedule>  finasteride 5 milliGRAM(s) Oral daily  heparin  Injectable 5000 Unit(s) SubCutaneous every 12 hours  metoclopramide 5 milliGRAM(s) Oral three times a day  metroNIDAZOLE  IVPB 250 milliGRAM(s) IV Intermittent every 8 hours  pantoprazole    Tablet 40 milliGRAM(s) Oral every 12 hours  simvastatin 10 milliGRAM(s) Oral at bedtime  sodium ferric gluconate complex Injectable 125 milliGRAM(s) IV Push <User Schedule>  vancomycin    Solution 500 milliGRAM(s) Oral every 6 hours    MEDICATIONS  (PRN):  acetaminophen   Tablet 650 milliGRAM(s) Oral every 8 hours PRN For Temp greater than 38 C (100.4 F)  acetaminophen   Tablet. 650 milliGRAM(s) Oral every 8 hours PRN Mild Pain (1 - 3)  albumin human 25% IVPB 50 milliLiter(s) IV Intermittent <User Schedule> PRN sbp<=120mmhg  ALBUTerol   0.5% 2.5 milliGRAM(s) Nebulizer every 4 hours PRN Shortness of Breath and/or Wheezing  diphenhydrAMINE   Capsule 25 milliGRAM(s) Oral at bedtime PRN sleep  fluticasone propionate 50 MICROgram(s)/spray Nasal Spray 1 Spray(s) Both Nostrils two times a day PRN runny nose  HYDROmorphone  Injectable 0.5 milliGRAM(s) IV Push every 6 hours PRN Severe Pain (7 - 10)  ondansetron Injectable 4 milliGRAM(s) IV Push every 6 hours PRN Nausea and/or Vomiting      Allergies    Zosyn (Rash)    Intolerances        I&O's Detail    2018 07:01  -  01 Aug 2018 07:00  --------------------------------------------------------  IN:    IV PiggyBack: 100 mL    Oral Fluid: 420 mL  Total IN: 520 mL    OUT:  Total OUT: 0 mL    Total NET: 520 mL          Vital Signs Last 24 Hrs  T(C): 36.4 (01 Aug 2018 11:14), Max: 36.7 (2018 21:34)  T(F): 97.6 (01 Aug 2018 11:14), Max: 98 (2018 21:34)  HR: 86 (01 Aug 2018 11:14) (73 - 99)  BP: 136/32 (01 Aug 2018 11:14) (113/32 - 158/47)  BP(mean): --  RR: 18 (01 Aug 2018 11:14) (16 - 18)  SpO2: 99% (01 Aug 2018 11:14) (97% - 99%)  Daily     Daily Weight in k.6 (01 Aug 2018 11:14)    PHYSICAL EXAM:  General: alert. awake Ox3  HEENT: MMM  CV: s1s2 rrr  LUNGS: B/L CTA  EXT: no edema    LABS:                        7.8    5.51  )-----------( 193      ( 01 Aug 2018 06:58 )             25.7     08-    141  |  108  |  27<H>  ----------------------------<  85  3.8   |  26  |  3.20<H>    Ca    7.4<L>      01 Aug 2018 06:58  Phos  2.8     08-  Mg     1.7     08-    TPro  x   /  Alb  2.2<L>  /  TBili  x   /  DBili  x   /  AST  x   /  ALT  x   /  AlkPhos  x   0731      Urinalysis Basic - ( 2018 06:00 )    Color: Yellow / Appearance: very cloudy / S.015 / pH: x  Gluc: x / Ketone: Trace  / Bili: Negative / Urobili: Negative mg/dL   Blood: x / Protein: 500 mg/dL / Nitrite: Negative   Leuk Esterase: Moderate / RBC: 3-5 /HPF / WBC >50   Sq Epi: x / Non Sq Epi: Occasional / Bacteria: Few      Phosphorus Level, Serum: 2.8 mg/dL ( @ 06:58)  Magnesium, Serum: 1.7 mg/dL ( @ 06:58)

## 2018-08-01 NOTE — CHART NOTE - NSCHARTNOTEFT_GEN_A_CORE
Assessment:   Pt started on HD in hospital, pt noted with cramping during sessions. Pt reports PO intake is improving slowly and pt is drinking supplement on try. Pt offered nepro supplement, in agreement to try. Discussed Renal diet with pt briefly. Pt at this time can remain on regular diet, but if PO intake increases will need to monitor PO4 and K. Pt aware. Will continue to monitor pt's nutritional status.     Recommendations:  Offer nepro BID  C/w current nutrition care plan and allow liberal diet      Diet Presciption: Diet, Regular (07-28-18 @ 10:20)      Wt Hx:  Height (cm): 170.18 (07-06-18 @ 13:17)  Weight (kg): 79.4 (07-06-18 @ 13:17)  BMI (kg/m2): 27.4 (07-06-18 @ 13:17)        Estimated Needs:   [x] no change since previous assessment    Estimated Energy Needs (25-30 calories/kg):  · Weight  (lbs) 151 lb  · Weight (kg) 68.4 kg  · From 1710 To 2052 Kcal/day     Other Calculation:  · Other Calculation  Ht.    67  "        Wt.191.8    #              BMI 31  (Adjusted for AKA)     #    Pt is at  140  %  IBW  #    Estimated Protein Needs (1.2-1.4 gm/kg):  From 82.08 To 95.76 g protein/day      Nutrition Diagnosis is [ ] ongoing  [ ] resolved [ ] not applicable   Nutrition Diagnostic Terminology #1: Nutrient  · Nutrient: Malnutrition; Pt meet criteria for severe protein/calorie malnutrition in context of acute illness secondary to significant weight loss, poor po intake < 75% x > 5 days, and severe muscle wasting.  · Etiology: Rapid AFIB, ileus, ARF/HD  · Signs/Symptoms: significant weight loss, severe muscle wasting, NPO status x 6 days  · Nutrition Intervention: Meals and Snack  · Meals and Snacks: Other (specify); Advance to clear liquid diet if feasible. Initiate TPN/PPN if NPO status > 7 days  · Goal/Expected Outcome: Tolerate advanced po diet/ TPN, No s/s of malnutrition        New Nutrition Diagnosis: [x] not applicable         Pertinent Medications: MEDICATIONS  (STANDING):  ALBUTerol/ipratropium for Nebulization. 3 milliLiter(s) Nebulizer once  allopurinol 100 milliGRAM(s) Oral daily  aspirin  chewable 81 milliGRAM(s) Oral daily  buDESOnide   0.5 milliGRAM(s) Respule 0.5 milliGRAM(s) Inhalation two times a day  cholestyramine Powder (Sugar-Free) 4 Gram(s) Oral two times a day  diltiazem    Tablet 30 milliGRAM(s) Oral every 6 hours  doxazosin 8 milliGRAM(s) Oral at bedtime  epoetin nelly Injectable 3000 Unit(s) IV Push <User Schedule>  epoetin nelly Injectable 4000 Unit(s) IV Push <User Schedule>  finasteride 5 milliGRAM(s) Oral daily  heparin  Injectable 5000 Unit(s) SubCutaneous every 12 hours  metoclopramide 5 milliGRAM(s) Oral three times a day  metroNIDAZOLE  IVPB 250 milliGRAM(s) IV Intermittent every 8 hours  pantoprazole    Tablet 40 milliGRAM(s) Oral every 12 hours  simvastatin 10 milliGRAM(s) Oral at bedtime  sodium ferric gluconate complex Injectable 125 milliGRAM(s) IV Push <User Schedule>  vancomycin    Solution 500 milliGRAM(s) Oral every 6 hours    MEDICATIONS  (PRN):  acetaminophen   Tablet 650 milliGRAM(s) Oral every 8 hours PRN For Temp greater than 38 C (100.4 F)  acetaminophen   Tablet. 650 milliGRAM(s) Oral every 8 hours PRN Mild Pain (1 - 3)  albumin human 25% IVPB 50 milliLiter(s) IV Intermittent <User Schedule> PRN sbp<=120mmhg  ALBUTerol   0.5% 2.5 milliGRAM(s) Nebulizer every 4 hours PRN Shortness of Breath and/or Wheezing  diphenhydrAMINE   Capsule 25 milliGRAM(s) Oral at bedtime PRN sleep  fluticasone propionate 50 MICROgram(s)/spray Nasal Spray 1 Spray(s) Both Nostrils two times a day PRN runny nose  HYDROmorphone  Injectable 0.5 milliGRAM(s) IV Push every 6 hours PRN Severe Pain (7 - 10)  ondansetron Injectable 4 milliGRAM(s) IV Push every 6 hours PRN Nausea and/or Vomiting    Pertinent Labs: 08-01 Na141 mmol/L Glu 85 mg/dL K+ 3.8 mmol/L Cr  3.20 mg/dL<H> BUN 27 mg/dL<H> 08-01 Phos 2.8 mg/dL 07-31 Alb 2.2 g/dL<L>     CAPILLARY BLOOD GLUCOSE          Skin: omar score = 17           Monitoring and Evaluation:   [x] PO intake/Nutr support infusion [ x ] Tolerance to Nutr [ x ] weights [ x ] labs[ x ] follow up per protocol  [ ] other:

## 2018-08-01 NOTE — PROGRESS NOTE ADULT - ASSESSMENT
82 y/o wm with hx of ckd stage 3 ( scr 1.5-1.7) with ARYA due to volume depletion from CDiff associated colitis and diarrhea in presence of diuretic use PTA.  Now with non anion gap metabolic acidosis and worsening renal parameters.  CXR with mild CHF with hx of diastolic CHF.    PLAN  - obtain abd and lactate  - will change ivf and add bicarbonate and keep at current rate  - hold lasix done.   - fu uop and scr closely and will monitor respiratory status  - bp and hr stable now, cardizem adjusted and lopressor added    7/9 MK  - ARYA: worsening renal parameters with metabolic acidosis, non ag.  Previous lactate WNL and since placed on bicarbonate drip  fu repeat abg today.  Increase IVF rate  possibility of HD discussed with enio, hcp daughter, pt has been agreeable in past.   DC hydralazine  dc morphine and change to dilaudid  - Cdiff with rising scr and worsening abd distension: CRS consult ongoing  possible repeat ct scan  DW Dr ballesteros    7/10  Anuric  anasarca  Non anion gap acidosis on bicarb drip  ARYA, anuric  CKD 3 history  d/w Family, and pt agreeable  called ICU for line placement and to dialyze the pt in ICU for monitoring    7/10  HD initiated via R temp HD catheter  tolerating well  no complaints  good abf    7/11 SY  --ARYA with Anasarca.  Urine output slightly improved.  However, remains quite fluid overloaded.  Will plan to continue HD until fluid status is much improved.  HD order written.  3 hour tx today.  Will aim to remove 2.5 kg as tolerated.  --C DIff colitis ; continue to monitor closely.    7/12 SY  --ARYA with Anasarca.   Remains Oligo-anuric.    --HD with goal of 2.5 kg UF again today.  --C Diff colitis.   Clinically improved   Continue to monitor.    7/13  The patient was dialyzed 3 days in a row this week  Discussed with the patient's daughter and hospitalist, intensivist  Will skip dialysis today, no urgent need for dialysis  We'll dialyze the patient tomorrow on Saturday and then skip Sunday to dialyze the patient again on Monday.    7/14  We'll dialyze against 4 K bath  Case discussed with the patient's daughter  May need a permanent catheter by Monday      7/15  Dialyze with a 4K bath  Potassium is 3.3 most likely from the diarrhea  Schedule for permacath placement after dialysis on Monday or Tuesday  Patient is comfortable no complications noted at this time    7/16 MK  - ARYA/ CKD stage 3 remains oliguirc and HD dependent.  LImited UF at HD toleated with the   hypokalemia corrected with HD.  Permcath planned today  - Cdiff: on PO vanc.  improved leukocytosis and abd distension     7/17  s/p perm cath  HD tomorrow  4 K bath  replace K   overall stable    7/18 SY  --ARYA/CKD for now remaining dialysis dependent.  Continue TIW HD.  --C Diff colitis ; improving clinically.    7/19 SY  --ARYA/CKD : Continue TIW HD.   Will continue as outpatient for now as no signs of recovery at this point.  --C Diff colitis  : clinically improving.  Continue po abtx.  --D/c Planning  D/w pt's daughter re : rehab with HD.  --Replete K.  Continue regular diet without restrictions for now.    7/20 MK  - ARYA/CKD remains HD dependent with oliguria.  Will attempt UF with HD.  K correction with HD and changed to regular diet with fluid restriction  - Cdiff: PO vanc with improving renal paramenters-  - Episodes of Desat: will have pulmonary re-eval prior to dc.  - DC planning rehab with dialysis  dw Dr Andrade  will see pt    7/21 MK  - ARYA/CKD remains hd dependent. post hd cxr with improved PVC.  Still with episodes of desaturation and dr andrade input noted.  CT with evidence of ascites for paracentesis  - cdiff: on po vanc.  still with loose stools,     7/22 MK  - ARYA/CKD remains HD dependent.  Will coordinate AM HD based upon timing of paracentesis, hypokalemia improved  - hypoxia with ascites: for paracentesis   - cdif on po vanc, rectal tube in place    7/23 MK  - ARYA/CKD remains HD dependent, toelrating hd.  + inc uop   - hypoxia with ascites: s/p paracentesis today, monitor response to oxygenation  - AOCD; fu trend of h/h, on epogen  - cdiff: po vanc    7/24 SY  --ARYA/CKD  Urine output may be increasing.  Follow creat trend to determine further dialysis support.  --Post Paracentesis : improve resp status.  --Anemia : continue LETICIA.  --C Diff : continue po Vanco.    7/25 SY  --ARYA/CKD  Monitor urine output.  Noted with increased creat.  Will maintain on TIW HD since fluid overloaded state is persistent.  --Monitor GI function.  --Post Paracentesis : resp status stabilized.  --Anemia :continue LETICIA.    7/26 SY  --ARYA/CKD  : Creat slowly  trending down.  Urine output not recorded.  Check creat in am to determine whether HD can be held off.  --Recurrent abdominal pain of concern.  Being assessed by primary team and GI.  --Bladder scan to assure full void.    7/27 MK  - ARYA/CKD: with + inc Uop and significant volume depletion overnight.  Bladder scan of 77 ml with stable respiratory status.  NO Hd today and reassess in the AM.      7/28 SY  --ARYA /CKD : positive increase in urine output. However, renal parameters remain elevated.  Therefore, will maintain on TIW HD schedule for now.  --Pt's fluid status has much improved.   Will HD today with no UF and allow increased fluid intake without restrictions.  --C DIff : continue to monitor and continue po abtx.    7/29 SY  --ARYA/CKD   post HD yesterday.   Continue to monitor for signs of renal recovery.  --Fluid overloaded state much improved.  --Monitor on regular diet.  --C Diff : continue po abtx.    7/30 MK  - ARYA/CKD, with stable renal parameters Unclear if with increasing UOP.  Will hold off on HD tomorrow and assess in AM  - Cloudy urine with white sputum production: fu with ua and urine cultures    7/31  minimal cloudy urine   creat up to 4.88  Abd distended \examined w Dr zendejas  Will dialyze today for fluid removal as tolerated, 4 K bath  May need paracentesis as per Dr Zendejas    8/1 MK  - ARYA/CKD will assess in am need for HD  - cdiff; on po vanc/iv flagyll

## 2018-08-01 NOTE — PROGRESS NOTE ADULT - ASSESSMENT
83M w/ PMH of Afib, CHF, CAD s/p stents, COPD, recent PNA on abx, upper GI bleed (duodenal) was admitted for recurrent c. diff colitis. Currently stable. No stool production overnight.

## 2018-08-01 NOTE — PROGRESS NOTE ADULT - ASSESSMENT
1) Clostridium Difficile Colitis clinically improved  2) Metabolic Acidosis  3) Restrictive Ventilatory Disease  4) SHAYY  5) Bilateral Pleural Effusions  6) Renal failure on dilaysis now  7) Leukocytosis is clinically improved  8)s/p shiley placed  9) increased ascites and small pleural effusions  10)Mucoid impaction seen in RLL cont to low recent O2 levels    82 y/o M w/pmhx of Afib, CHF, CAD s/p stents, COPD, PNA, upper GI bleed (duodenal)  BIB EMS from [facility] assisted living for fever, abd pain, and diarrhea that began 3 weeks ago. Was in the hospital for ESBL and was later dx with C-diff and started on a course of PO vancomycin for 10 days for the C-diff. At the present time has diffuse abdominal pain, being treated Vancomycin and IV Metronidazole  ABG reviewed and reveals 7.18/39/68, LA normal limits  Etiology likely from sepsis   repeat ABG noted  ABG - ( 09 Jul 2018 11:50 )  pH, Arterial: 7.21  pH, Blood: x     /  pCO2: 42    /  pO2: 67    / HCO3: 16    / Base Excess: -10.6 /  SaO2: 91      repeat cxr noted  At the present time, patient states he would like to be a full code  Continue monitoring hemodynamics (daughter states baseline diastolic tends to run between 25-40mmHg)   Monitor urine output aggressively   Used BIPAP/CPAP in the past (stopped as an outpatient after patient lost weight), may worsen intraabdominal pressures but if arrhythmias are present, may consider starting again.   Appreciate nephrology/cardiology/ID/GI/hospitalist recommendations  Will continue to monitor   hx of resp failure and intubation 2012 x 3-4 days  s/p Shiley and hemodialysis    overall prognosis remains guarded      hx SHAYY and treated with BIPAP in the past / stopped using it after wt loss  I was asked by Dr Tati Tomas to re eval for reported episodes of desaturations  pt's reported hypoxemic episodes after narcotics / while sleeping or having abd distention  he has known hx SHAYY / OHS and prior BIPAP 12/8 with O2 attached at home  will need supplemental O2 at night 3 liters nc as well as exertional need for O2 supplement as outpt  currently on o2 with o2 sat 96%  resp status improved after paracentesis  increased ascites and small pleural effusions improved after paracentesis  Mucoid impaction seen in RLL cont to low recent O2 levels  waiting for bed placement / rehab  stable resp status  will get fu cxr today  all reviewed with pt today 8/1/2018    Thank you for the consult

## 2018-08-01 NOTE — PROGRESS NOTE ADULT - ASSESSMENT
Pt is a 82 y/o M w/pmhx of Afib, CHF, CAD s/p stents, COPD, PNA, upper GI bleed (duodenal), PVD, s/p right lower extremity amputation,  recent hospitalization for pneumonia and subsequent Cdiff colitis admitted on 7/6 from assisted living for evaluation of persistent abdominal pain, fever and diarrhea that has been ongoing despite the po vancomycin course that the patient had been on. The patient also notes mid epigastric/chest pain. History per daughter at bedside and medical record as patient is poor historian.  1. Patient admitted with severe Cdiff pan colitis, also noted with leukocytosis most likely reactive to Cdiff infection  - follow up cultures   - iv hydration and supportive care   - serial cbc and monitor temperature   - back on vancomycin 500 mg po q 6 hours, day #9  - restarted flagyl 250 mg iv q 8 hours, lower dose given dialysis, day #9  - continue contact isolation  - limit antibiotics exposure  2. other issues: Afib, CHF, CAD s/p stents, COPD, PNA, upper GI bleed (duodenal), PVD, s/p right lower extremity amputation  - per medicine  3. Bladder wall thickening most likely due to the fact that patient now dialysis patient  - would avoid starting antibiotics in this patient with severe CDiff colitis  - monitor off antibiotics

## 2018-08-01 NOTE — PROGRESS NOTE ADULT - PROBLEM SELECTOR PLAN 1
- Cont vanc 500 mg q6H -Will eventually taper to 3 times a day for 2 weeks, then twice a day for one week, then daily for one week, then every other day for 1 week, then every 3rd day for 1 week.  - Cont flagyl  mg q8h (restarted on 7/27) -- Consider switch to po flagyl if no diarrhea > 24hrs.  ID recs: limit abx exposure, cont current abx regimen, contact isolation, do not add new abx for cystitis  GI recs: cont current abx for now. Hold a/c due to risk of bleeding. - Cont vanc 500 mg q6H -Will eventually taper to 3 times a day for 2 weeks, then twice a day for one week, then daily for one week, then every other day for 1 week, then every 3rd day for 1 week.  - Cont flagyl  mg q8h (restarted on 7/27) -- Consider switch to po flagyl if no diarrhea > 24hrs.  -ID recs: limit abx exposure, cont current abx regimen, contact isolation, do not add new abx for cystitis  -GI recs: cont current abx for now. Hold a/c due to risk of bleeding.  - F/u GI and Nephro recommendation about D/C

## 2018-08-01 NOTE — PROGRESS NOTE ADULT - SUBJECTIVE AND OBJECTIVE BOX
Pt has been seen and examined with FP resident, resident supervised agree with a/p       Patient is a 83y old  Male who presents with a chief complaint of Fever/Diarrhea (20 Jul 2018 13:30)        PHYSICAL EXAM:  Vital Signs Last 24 Hrs  T(C): 36.4 (01 Aug 2018 11:14), Max: 36.7 (31 Jul 2018 21:34)  T(F): 97.6 (01 Aug 2018 11:14), Max: 98 (31 Jul 2018 21:34)  HR: 86 (01 Aug 2018 11:14) (73 - 89)  BP: 136/32 (01 Aug 2018 11:14) (113/32 - 136/32)  BP(mean): --  RR: 18 (01 Aug 2018 11:14) (17 - 18)  SpO2: 99% (01 Aug 2018 11:14) (97% - 99%)  general- comfortable, chronically ill appearance   -[id number],cta  -cvs-[id number] normal   -p/a-big, soft, tender on deep palpation bs+, ? ascitis present   -extremity- no asymmetrical swelling noted         A/P    #Appears that his Ascitics fluids has reaccumulated   -get us of abdomen and tap prn for symptomatic releif    #C diff- ct oral vanco and iv flagyl     #supportive care

## 2018-08-02 LAB
ALBUMIN SERPL ELPH-MCNC: 2.2 G/DL — LOW (ref 3.3–5)
ANION GAP SERPL CALC-SCNC: 8 MMOL/L — SIGNIFICANT CHANGE UP (ref 5–17)
BLD GP AB SCN SERPL QL: SIGNIFICANT CHANGE UP
BUN SERPL-MCNC: 34 MG/DL — HIGH (ref 7–23)
CALCIUM SERPL-MCNC: 7.7 MG/DL — LOW (ref 8.5–10.1)
CHLORIDE SERPL-SCNC: 108 MMOL/L — SIGNIFICANT CHANGE UP (ref 96–108)
CO2 SERPL-SCNC: 24 MMOL/L — SIGNIFICANT CHANGE UP (ref 22–31)
CREAT SERPL-MCNC: 4.09 MG/DL — HIGH (ref 0.5–1.3)
GLUCOSE SERPL-MCNC: 75 MG/DL — SIGNIFICANT CHANGE UP (ref 70–99)
HCT VFR BLD CALC: 25.7 % — LOW (ref 39–50)
HGB BLD-MCNC: 7.6 G/DL — LOW (ref 13–17)
MAGNESIUM SERPL-MCNC: 1.9 MG/DL — SIGNIFICANT CHANGE UP (ref 1.6–2.6)
MCHC RBC-ENTMCNC: 29.2 PG — SIGNIFICANT CHANGE UP (ref 27–34)
MCHC RBC-ENTMCNC: 29.6 GM/DL — LOW (ref 32–36)
MCV RBC AUTO: 98.8 FL — SIGNIFICANT CHANGE UP (ref 80–100)
NRBC # BLD: 0 /100 WBCS — SIGNIFICANT CHANGE UP (ref 0–0)
PHOSPHATE SERPL-MCNC: 3 MG/DL — SIGNIFICANT CHANGE UP (ref 2.5–4.5)
PLATELET # BLD AUTO: 190 K/UL — SIGNIFICANT CHANGE UP (ref 150–400)
POTASSIUM SERPL-MCNC: 3.9 MMOL/L — SIGNIFICANT CHANGE UP (ref 3.5–5.3)
POTASSIUM SERPL-SCNC: 3.9 MMOL/L — SIGNIFICANT CHANGE UP (ref 3.5–5.3)
RBC # BLD: 2.6 M/UL — LOW (ref 4.2–5.8)
RBC # FLD: 19.3 % — HIGH (ref 10.3–14.5)
SODIUM SERPL-SCNC: 140 MMOL/L — SIGNIFICANT CHANGE UP (ref 135–145)
TYPE + AB SCN PNL BLD: SIGNIFICANT CHANGE UP
WBC # BLD: 5.93 K/UL — SIGNIFICANT CHANGE UP (ref 3.8–10.5)
WBC # FLD AUTO: 5.93 K/UL — SIGNIFICANT CHANGE UP (ref 3.8–10.5)

## 2018-08-02 PROCEDURE — 76705 ECHO EXAM OF ABDOMEN: CPT | Mod: 26

## 2018-08-02 RX ADMIN — SIMVASTATIN 10 MILLIGRAM(S): 20 TABLET, FILM COATED ORAL at 22:54

## 2018-08-02 RX ADMIN — Medication 500 MILLIGRAM(S): at 17:16

## 2018-08-02 RX ADMIN — HEPARIN SODIUM 5000 UNIT(S): 5000 INJECTION INTRAVENOUS; SUBCUTANEOUS at 17:16

## 2018-08-02 RX ADMIN — Medication 0.5 MILLIGRAM(S): at 08:09

## 2018-08-02 RX ADMIN — Medication 5 MILLIGRAM(S): at 22:53

## 2018-08-02 RX ADMIN — Medication 50 MILLIGRAM(S): at 21:18

## 2018-08-02 RX ADMIN — Medication 5 MILLIGRAM(S): at 13:10

## 2018-08-02 RX ADMIN — FINASTERIDE 5 MILLIGRAM(S): 5 TABLET, FILM COATED ORAL at 11:34

## 2018-08-02 RX ADMIN — Medication 50 MILLIGRAM(S): at 13:10

## 2018-08-02 RX ADMIN — Medication 500 MILLIGRAM(S): at 05:36

## 2018-08-02 RX ADMIN — CHOLESTYRAMINE 4 GRAM(S): 4 POWDER, FOR SUSPENSION ORAL at 08:00

## 2018-08-02 RX ADMIN — Medication 500 MILLIGRAM(S): at 22:54

## 2018-08-02 RX ADMIN — Medication 5 MILLIGRAM(S): at 05:36

## 2018-08-02 RX ADMIN — PANTOPRAZOLE SODIUM 40 MILLIGRAM(S): 20 TABLET, DELAYED RELEASE ORAL at 05:38

## 2018-08-02 RX ADMIN — Medication 100 MILLIGRAM(S): at 11:34

## 2018-08-02 RX ADMIN — Medication 8 MILLIGRAM(S): at 22:53

## 2018-08-02 RX ADMIN — Medication 81 MILLIGRAM(S): at 11:34

## 2018-08-02 RX ADMIN — Medication 50 MILLIGRAM(S): at 05:35

## 2018-08-02 RX ADMIN — Medication 0.5 MILLIGRAM(S): at 19:16

## 2018-08-02 RX ADMIN — Medication 500 MILLIGRAM(S): at 11:34

## 2018-08-02 RX ADMIN — HEPARIN SODIUM 5000 UNIT(S): 5000 INJECTION INTRAVENOUS; SUBCUTANEOUS at 05:37

## 2018-08-02 RX ADMIN — PANTOPRAZOLE SODIUM 40 MILLIGRAM(S): 20 TABLET, DELAYED RELEASE ORAL at 17:16

## 2018-08-02 RX ADMIN — CHOLESTYRAMINE 4 GRAM(S): 4 POWDER, FOR SUSPENSION ORAL at 21:18

## 2018-08-02 NOTE — PROGRESS NOTE ADULT - SUBJECTIVE AND OBJECTIVE BOX
Patient is a 83y old  Male who presents with a chief complaint of complain of diarrhea, fever (06 Jul 2018 23:55)      HPI:  Pt is a 84 y/o M w/pmhx of Afib, CHF, CAD s/p stents, COPD, PNA, upper GI bleed (duodenal)  BIB EMS from [facility] assisted living for fever, abd pain, and diarrhea that began 3 weeks ago. Was in the hospital for PNA tx and was later dx with C-diff and started on a course of PO vancomycin for 10 days for the C-diff. States that he has had diarrhea since before the course of abx. Pain has been increased for the past few weeks and is characterized as a cramping feeling. Daughters also note that there is increased distension. Also notes chest pain characterized as a cramping in the central chest. 83% O2 sat noted this morning at the assisted living facility. Takes O2 routinely at night but not during the day. (06 Jul 2018 23:55)  Patient's daughter states he was treated for ESBL with antibiotics prior to this  7/10  seen and examined with his dter at bedside  hx of resp failure and intubation 2012 x 3-4 days  feels ok with breathing now  being evaluated for surgical interventions with pancolitis  possibility of post op need for vent support discussed witrh pt and his dtr  he wants to proceed understanding high risk for surgery due to compromised medical condition  hx SHAYY and treated with BIPAP in the past / stopped using it after wt loss  7/11  seen in ICU with dtr at bedside  data discussed with critical care RN  s/p Ross and hemodialysis  still with abd distention  family wants to wait on abd surgery given he is high risk  7/12  family at bedside updated  no cp  no difficulty breathing  intermittent wheezing  7/13  NGT is discontinued  dtr at bedside updated  no issue with breathing now  7/14  having HD  some po intake tolerated ok  dtr at bedside updated  no active pulm issues  7/15  resting comfortably  no distress  Gi and renal eval noted  7/16  feels better  having dialysis  awake and alert  po intake tolerated well  7/17  dtr at bedside  s/lauren tate placed right chest  no cp    7/21  I was asked by Dr Tati Tomas to re eval for reported episodes of desaturations  DTR at bedside  data discussed at length  pt's reported hypoxemic episodes after narcotics / while sleeping or having abd distention  he has known hx SHAYY / OHS and prior BIPAP 12/8 with O2 attached at home  this am after 35% VM was removed in his room, RA O2 sat 88%-93% range  no distress  no significant cough  occasional wheeze    7/22  feels the same  family updated at bedside  reported plan for paracentesis in am  vomited once  7/23  data rev with critical care RN at bedside this am  overnight desat required 35% VM, o2 sat this am 93%  no distress  sleeping  plan for paracentesis   7/24  seen on 5 [place] today  feels better  no issues with his breathing  s/p paracentesis  7/26  dtr at bedside updated today  no active pulm issues now  8/1  pt is seen and examined today  no active pulm sxs  no abd pain  awake and alert  8/2  data discussed with pt and his dtr today  feels the same  some residual abd pain  PAST MEDICAL & SURGICAL HISTORY:  Diastolic CHF  Peripheral vascular disease  Afib  Anemia  CKD (chronic kidney disease)  COPD (chronic obstructive pulmonary disease)  SHAYY (obstructive sleep apnea)  Sepsis, due to unspecified organism: 2/2 poorly healing wounds b/l  Dyspepsia: On moderate exertion.  Sleep apnea, obstructive: Requires home 02 therapy, and treatment with BIPAP  Atelectasis  Pleural effusion, bilateral  Respiratory failure  Peripheral edema  CRI (chronic renal insufficiency)  Gout  Benign prostatic hypertrophy  Spinal stenosis  Hypercholesterolemia  GERD (gastroesophageal reflux disease)  CAD (coronary artery disease)  Hypertension  S/P angioplasty with stent  Cataract of left eye  Prostate: Surgery green light procedure.  S/P rotator cuff surgery: Right  S/P angioplasty    MEDICATIONS  (STANDING):  ALBUTerol/ipratropium for Nebulization. 3 milliLiter(s) Nebulizer once  allopurinol 100 milliGRAM(s) Oral daily  aspirin  chewable 81 milliGRAM(s) Oral daily  buDESOnide   0.5 milliGRAM(s) Respule 0.5 milliGRAM(s) Inhalation two times a day  cholestyramine Powder (Sugar-Free) 4 Gram(s) Oral two times a day  diltiazem    Tablet 30 milliGRAM(s) Oral every 6 hours  doxazosin 8 milliGRAM(s) Oral at bedtime  epoetin nelly Injectable 3000 Unit(s) IV Push <User Schedule>  epoetin nelly Injectable 4000 Unit(s) IV Push <User Schedule>  finasteride 5 milliGRAM(s) Oral daily  heparin  Injectable 5000 Unit(s) SubCutaneous every 12 hours  metoclopramide 5 milliGRAM(s) Oral three times a day  metroNIDAZOLE  IVPB 250 milliGRAM(s) IV Intermittent every 8 hours  pantoprazole    Tablet 40 milliGRAM(s) Oral every 12 hours  simvastatin 10 milliGRAM(s) Oral at bedtime  sodium ferric gluconate complex Injectable 125 milliGRAM(s) IV Push <User Schedule>  vancomycin    Solution 500 milliGRAM(s) Oral every 6 hours            MEDICATIONS  (PRN):  acetaminophen   Tablet 650 milliGRAM(s) Oral every 8 hours PRN For Temp greater than 38 C (100.4 F)  acetaminophen   Tablet. 650 milliGRAM(s) Oral every 8 hours PRN Mild Pain (1 - 3)  ALBUTerol   0.5% 2.5 milliGRAM(s) Nebulizer every 4 hours PRN Shortness of Breath and/or Wheezing  morphine  - Injectable 2 milliGRAM(s) IV Push every 6 hours PRN Severe Pain (7 - 10)  ondansetron Injectable 4 milliGRAM(s) IV Push every 6 hours PRN Nausea and/or Vomiting      FAMILY HISTORY:  No pertinent family history in first degree relatives    REVIEW OF SYSTEM:  abdominal pain, otherwise 12 point ROS negative     Vital Signs Last 24 Hrs  T(C): 37 (02 Aug 2018 05:16), Max: 37 (02 Aug 2018 05:16)  T(F): 98.6 (02 Aug 2018 05:16), Max: 98.6 (02 Aug 2018 05:16)  HR: 76 (02 Aug 2018 05:16) (76 - 86)  BP: 136/40 (02 Aug 2018 05:16) (117/29 - 144/66)  BP(mean): --  RR: 18 (02 Aug 2018 05:16) (18 - 18)  SpO2: 95% (02 Aug 2018 05:16) (94% - 99%)  PHYSICAL EXAM  General Appearance: cooperative, no acute distress,   HEENT: PERRL, conjunctiva clear, EOM's intact, non injected pharynx, no exudate, TM   normal  Neck: Supple, , no adenopathy, thyroid: not enlarged, no carotid bruit or JVD  Back: Symmetric, no  tenderness,no soft tissue tenderness  Lungs:IMPROVED Diminished bilaterally R>L with some dullness to percussion, mostly resolved  no wheezing  Heart: Regular rate and rhythm, S1, S2 normal, no murmur, rub or gallop  Abdomen:  non tender,  active bowel sounds, decreased Distention   Extremities: pos peripheral edema / Hx Right leg amputation  Skin: Skin color, texture normal, no rashes   Neurologic: Alert and oriented X3 , cranial nerves intact, sensory and motor normal,    ECG:    LABS:                          7.8    5.51  )-----------( 193      ( 01 Aug 2018 06:58 )             25.7   08-01    141  |  108  |  27<H>  ----------------------------<  85  3.8   |  26  |  3.20<H>    Ca    7.4<L>      01 Aug 2018 06:58  Phos  2.8     08-01  Mg     1.7     08-01    TPro  x   /  Alb  2.2<L>  /  TBili  x   /  DBili  x   /  AST  x   /  ALT  x   /  AlkPhos  x   07-31                          7.7    4.32  )-----------( 203      ( 24 Jul 2018 07:08 )             25.3   07-24    142  |  106  |  29<H>  ----------------------------<  89  3.6   |  27  |  3.46<H>    Ca    7.4<L>      24 Jul 2018 07:08  Phos  5.4     07-23  Mg     1.9     07-24    TPro  4.7<L>  /  Alb  2.3<L>  /  TBili  x   /  DBili  x   /  AST  x   /  ALT  x   /  AlkPhos  x   07-23                                   8.0    6.95  )-----------( 247      ( 22 Jul 2018 06:19 )             26.7   07-22    144  |  111<H>  |  33<H>  ----------------------------<  104<H>  4.1   |  25  |  4.10<H>    Ca    7.3<L>      22 Jul 2018 06:19  Mg     1.8     07-22    TPro  4.4<L>  /  Alb  2.1<L>  /  TBili  0.4  /  DBili  x   /  AST  7<L>  /  ALT  <6<L>  /  AlkPhos  48  07-22               7.5    9.09  )-----------( 225      ( 21 Jul 2018 06:08 )             24.5   07-21    139  |  107  |  24<H>  ----------------------------<  101<H>  3.2<L>   |  28  |  3.31<H>    Ca    7.5<L>      21 Jul 2018 06:08  Phos  4.1     07-20  Mg     1.9     07-21    TPro  x   /  Alb  2.1<L>  /  TBili  x   /  DBili  x   /  AST  x   /  ALT  x   /  AlkPhos  x   07-20                                   8.4    10.13 )-----------( 199      ( 17 Jul 2018 05:41 )             26.8   07-17    142  |  108  |  19  ----------------------------<  89  3.4<L>   |  25  |  3.02<H>    Ca    7.2<L>      17 Jul 2018 05:41                 8.4    8.91  )-----------( 181      ( 15 Jul 2018 06:16 )             26.4   07-15    142  |  107  |  23  ----------------------------<  101<H>  3.3<L>   |  26  |  3.04<H>    Ca    7.5<L>      15 Jul 2018 06:16                            8.2    10.15 )-----------( 183      ( 13 Jul 2018 05:14 )             26.1   07-13    143  |  107  |  26<H>  ----------------------------<  89  3.5   |  26  |  2.62<H>    Ca    7.2<L>      13 Jul 2018 05:14  Phos  4.3     07-12  Mg     2.0     07-12    TPro  x   /  Alb  2.0<L>  /  TBili  x   /  DBili  x   /  AST  x   /  ALT  x   /  AlkPhos  x   07-12                            8.3    10.62 )-----------( 202      ( 12 Jul 2018 05:17 )             26.8   07-12    141  |  107  |  33<H>  ----------------------------<  66<L>  3.9   |  23  |  2.84<H>    Ca    7.4<L>      12 Jul 2018 05:17  Phos  4.3     07-12  Mg     2.0     07-12    TPro  x   /  Alb  2.0<L>  /  TBili  x   /  DBili  x   /  AST  x   /  ALT  x   /  AlkPhos  x   07-12                          8.8    20.36 )-----------( 265      ( 10 Jul 2018 05:47 )             28.0   07-11    142  |  108  |  52<H>  ----------------------------<  80  4.0   |  23  |  3.82<H>    Ca    7.0<L>      11 Jul 2018 06:20  Phos  4.9     07-11  Mg     1.9     07-11                              8.1    20.78 )-----------( 239      ( 09 Jul 2018 05:48 )             25.6     07-09    144  |  114<H>  |  65<H>  ----------------------------<  113<H>  4.5   |  17<L>  |  3.90<H>    Ca    7.1<L>      09 Jul 2018 05:48  Mg     1.9     07-09      CARDIAC MARKERS ( 07 Jul 2018 17:51 )  0.024 ng/mL / x     / x     / x     / x                  ABG - ( 08 Jul 2018 15:47 )  pH, Arterial: 7.18  pH, Blood: x     /  pCO2: 39    /  pO2: 68    / HCO3: 14    / Base Excess: -13.1 /  SaO2: 92            < from: Xray Chest 1 View-PORTABLE IMMEDIATE (07.10.18 @ 15:50) >  INTERPRETATION:  CLINICAL INDICATION:  R  I J dialysis line placement    TECHNIQUE: Single view AP chest radiograph was performed.    COMPARISON:  Chest radiograph performed earlier on the same day,   7/10/2018 at 8:28 AM and chest radiograph dated 7/6/2018.    FINDINGS:    Interval placement of right-sided jugular central venous catheter with   tip projecting over the right atrium. No evidence forpneumothorax.    There is persistent mild pulmonary vascular congestion. There is trace   fluid within the right minor fissure.    The cardiac silhouette size is stable. Diffuse aortic calcifications are   noted    Redemonstration of anchor screw within the right humeral head.     IMPRESSION:    Interval placement of right-sided IJ CVC, with tip projecting over the   right atrium. No pneumothorax.     < end of copied text >          RADIOLOGY & ADDITIONAL STUDIES:  IMPRESSION:     Severe pancolitis consistent with pseudomembranous colitis.    Mild pulmonary edema.    Small loculated right and trace layering left pleural effusions.    < from: Xray Chest 1 View- PORTABLE-Routine (07.20.18 @ 15:52) >    PROCEDURE DATE:  07/20/2018          INTERPRETATION:  History: Acute on chronic kidney disease on   hemodialysis. Oxygen desaturation.    Single view of the chest was performed.    Comparison is made to July 16, 2018.    Findings:    There is a right-sided hemodialysis catheter in place tip at cavoatrial   junction. The lungs are clear. There is moderate improvement of   previously noted pulmonary vascular congestion. Heart size within normal   limits. Patient is status post right rotator cuff repair.    Impression:    Improvement of previously noted pulmonary vascular congestion.    < end of copied text >  LUNG BASES: Small bilateral pleural effusions. Loculated fluid versus   mucoid impaction at the right lung base.    IMPRESSION:     Stable appearance of pancolitis consistent with pseudomembranous colitis.   No pneumatosis or free air.    Increasing moderate ascites.    Anasarca.    Urinary bladder cystitis. Correlate with UA.    < from: Xray Chest 1 View- PORTABLE-Routine (07.20.18 @ 15:52) >  PROCEDURE DATE:  07/20/2018          INTERPRETATION:  History: Acute on chronic kidney disease on   hemodialysis. Oxygen desaturation.    Single view of the chest was performed.    Comparison is made to July 16, 2018.    Findings:    There is a right-sided hemodialysis catheter in place tip at cavoatrial   junction. The lungs are clear. There is moderate improvement of   previously noted pulmonary vascular congestion. Heart size within normal   limits. Patient is status post right rotator cuff repair.    Impression:    Improvement of previously noted pulmonary vascular congestion.    < end of copied text >  cxr personally reveiwed  < from: Xray Chest 1 View- PORTABLE-Routine (08.01.18 @ 10:09) >  PROCEDURE DATE:  08/01/2018          INTERPRETATION:  Exam Date: 8/1/2018 10:09 AM    Chest radiograph (one view)         CLINICAL INFORMATION:  CHF follow-up    TECHNIQUE:  Single frontal view of the chest was obtained.    COMPARISON: July 20, 2018    FINDINGS/  IMPRESSION:          Pulmonary vascular congestion and small left pleural effusion is present.   Cardiomediastinal silhouette is intact.    < end of copied text >

## 2018-08-02 NOTE — PROGRESS NOTE ADULT - ASSESSMENT
Pt is a 82 y/o M w/pmhx of Afib, CHF, CAD s/p stents, COPD, PNA, upper GI bleed (duodenal), PVD, s/p right lower extremity amputation,  recent hospitalization for pneumonia and subsequent Cdiff colitis admitted on 7/6 from assisted living for evaluation of persistent abdominal pain, fever and diarrhea that has been ongoing despite the po vancomycin course that the patient had been on. The patient also notes mid epigastric/chest pain. History per daughter at bedside and medical record as patient is poor historian.  1. Patient admitted with severe Cdiff pan colitis, also noted with leukocytosis most likely reactive to Cdiff infection  - follow up cultures   - iv hydration and supportive care   - serial cbc and monitor temperature   - back on vancomycin 500 mg po q 6 hours, day #10  - restarted flagyl 250 mg iv q 8 hours, lower dose given dialysis, day #10  - after 14 days of rx will start the vancomycin taper; will stop flagyl today  - continue contact isolation  - limit antibiotics exposure  2. other issues: Afib, CHF, CAD s/p stents, COPD, PNA, upper GI bleed (duodenal), PVD, s/p right lower extremity amputation  - per medicine  3. Bladder wall thickening most likely due to the fact that patient now dialysis patient  - would avoid starting antibiotics in this patient with severe CDiff colitis  - monitor off antibiotics

## 2018-08-02 NOTE — PROGRESS NOTE ADULT - SUBJECTIVE AND OBJECTIVE BOX
Pt has been seen and examined with FP resident, resident supervised agree with a/p       Patient is a 83y old  Male who presents with a chief complaint of Fever/Diarrhea (20 Jul 2018 13:30)        PHYSICAL EXAM:    general- comfortable, chronically ill appearance   -[id number],cta  -cvs-s1s2 normal   -p/a-big, soft, tender on deep palpation bs+, ? ascitis present   -extremity- no asymmetrical swelling noted         A/P    #Appears that his Ascitics fluids has reaccumulated   -get us of abdomen and tap prn for symptomatic releif    #C diff colitis and diarrhea- his diarrhea is almost getting better and stool consistency changing to solid   -ct oral vanco     #supportive care

## 2018-08-02 NOTE — PROGRESS NOTE ADULT - PROBLEM SELECTOR PLAN 1
- Vancomycin po.  - Cont flagyl  mg q8h (restarted on 7/27)   -ID recs: limit abx exposure, cont current abx regimen, contact isolation, do not add new abx for cystitis  -GI recs: cont current abx for now. Hold a/c due to risk of bleeding.  - F/u GI and Nephro recommendation about D/C

## 2018-08-02 NOTE — PROGRESS NOTE ADULT - ASSESSMENT
1) Clostridium Difficile Colitis clinically improved  2) Metabolic Acidosis  3) Restrictive Ventilatory Disease  4) SHAYY  5) Bilateral Pleural Effusions  6) Renal failure on dilaysis now  7) Leukocytosis is clinically improved  8)s/p shiley placed  9) increased ascites and small pleural effusions  10)Mucoid impaction seen in RLL cont to low recent O2 levels    84 y/o M w/pmhx of Afib, CHF, CAD s/p stents, COPD, PNA, upper GI bleed (duodenal)  BIB EMS from [facility] assisted living for fever, abd pain, and diarrhea that began 3 weeks ago. Was in the hospital for ESBL and was later dx with C-diff and started on a course of PO vancomycin for 10 days for the C-diff. At the present time has diffuse abdominal pain, being treated Vancomycin and IV Metronidazole  ABG reviewed and reveals 7.18/39/68, LA normal limits  Etiology likely from sepsis   repeat ABG noted  ABG - ( 09 Jul 2018 11:50 )  pH, Arterial: 7.21  pH, Blood: x     /  pCO2: 42    /  pO2: 67    / HCO3: 16    / Base Excess: -10.6 /  SaO2: 91      repeat cxr noted  At the present time, patient states he would like to be a full code  Continue monitoring hemodynamics (daughter states baseline diastolic tends to run between 25-40mmHg)   Monitor urine output aggressively   Used BIPAP/CPAP in the past (stopped as an outpatient after patient lost weight), may worsen intraabdominal pressures but if arrhythmias are present, may consider starting again.   Appreciate nephrology/cardiology/ID/GI/hospitalist recommendations  Will continue to monitor   hx of resp failure and intubation 2012 x 3-4 days  s/p Shiley and hemodialysis    overall prognosis remains guarded      hx SHAYY and treated with BIPAP in the past / stopped using it after wt loss  I was asked by Dr Tati Tomas to re eval for reported episodes of desaturations  pt's reported hypoxemic episodes after narcotics / while sleeping or having abd distention  he has known hx SHAYY / OHS and prior BIPAP 12/8 with O2 attached at home  will need supplemental O2 at night 3 liters nc as well as exertional need for O2 supplement as outpt  currently on o2 with o2 sat 96%  resp status improved after paracentesis  increased ascites and small pleural effusions improved after paracentesis  Mucoid impaction seen in RLL cont to low recent O2 levels  waiting for bed placement / rehab  stable resp status  cxr findings noted and discussed  all reviewed with pt and dtr today 8/2/2018    Thank you for the consult

## 2018-08-02 NOTE — PROGRESS NOTE ADULT - SUBJECTIVE AND OBJECTIVE BOX
Patient is a 83y old  Male who presents with a chief complaint of Fever/Diarrhea (20 Jul 2018 13:30)      HPI:  Pt is a 84 y/o M w/pmhx of Afib, CHF, CAD s/p stents, COPD, PNA, upper GI bleed (duodenal)  BIB EMS from [facility] living for fever, abd pain, and diarrhea that began 3 weeks ago. Was in the hospital for PNA tx and was later dx with C-diff and started on a course of PO vancomycin for 10 days for the C-diff. States that he has had diarrhea since before the course of abx. Pain has been increased for the past few weeks and is characterized as a cramping feeling. Daughters also note that there is increased distension. Also notes chest pain characterized as a cramping in the central chest. 83% O2 sat noted this morning at the assisted living facility. Takes O2 routinely at night but not during the day. (06 Jul 2018 23:55)    August 2, patient with mild right upper quadrant pain, improving. He had 3 bowel movements in 24 hours. Negative nausea vomiting. Negative chest pain.  During dialysis, he had some upper abdominal crampy pain with some reflux however, this has improved, 2 days ago.    He has been having a cough, productive of clear sputum. Denies any shortness of breath.  History is per patient and daughter.        PAST MEDICAL & SURGICAL HISTORY:  Diastolic CHF  Peripheral vascular disease  Afib  Anemia  CKD (chronic kidney disease)  COPD (chronic obstructive pulmonary disease)  SHAYY (obstructive sleep apnea)  Sepsis, due to unspecified organism: 2/2 poorly healing wounds b/l  Dyspepsia: On moderate exertion.  Sleep apnea, obstructive: Requires home 02 therapy, and treatment with BIPAP  Atelectasis  Pleural effusion, bilateral  Respiratory failure  Peripheral edema  CRI (chronic renal insufficiency)  Gout  Benign prostatic hypertrophy  Spinal stenosis  Hypercholesterolemia  GERD (gastroesophageal reflux disease)  CAD (coronary artery disease)  Hypertension  S/P angioplasty with stent  Cataract of left eye  Prostate: Surgery green light procedure.  S/P rotator cuff surgery: Right  S/P angioplasty      MEDICATIONS  (STANDING):  ALBUTerol/ipratropium for Nebulization. 3 milliLiter(s) Nebulizer once  allopurinol 100 milliGRAM(s) Oral daily  aspirin  chewable 81 milliGRAM(s) Oral daily  buDESOnide   0.5 milliGRAM(s) Respule 0.5 milliGRAM(s) Inhalation two times a day  cholestyramine Powder (Sugar-Free) 4 Gram(s) Oral two times a day  diltiazem    Tablet 30 milliGRAM(s) Oral every 6 hours  doxazosin 8 milliGRAM(s) Oral at bedtime  epoetin nelly Injectable 3000 Unit(s) IV Push <User Schedule>  epoetin nelly Injectable 4000 Unit(s) IV Push <User Schedule>  finasteride 5 milliGRAM(s) Oral daily  heparin  Injectable 5000 Unit(s) SubCutaneous every 12 hours  metoclopramide 5 milliGRAM(s) Oral three times a day  metroNIDAZOLE  IVPB 250 milliGRAM(s) IV Intermittent every 8 hours  pantoprazole    Tablet 40 milliGRAM(s) Oral every 12 hours  simvastatin 10 milliGRAM(s) Oral at bedtime  sodium ferric gluconate complex Injectable 125 milliGRAM(s) IV Push <User Schedule>  vancomycin    Solution 500 milliGRAM(s) Oral every 6 hours    MEDICATIONS  (PRN):  acetaminophen   Tablet 650 milliGRAM(s) Oral every 8 hours PRN For Temp greater than 38 C (100.4 F)  acetaminophen   Tablet. 650 milliGRAM(s) Oral every 8 hours PRN Mild Pain (1 - 3)  albumin human 25% IVPB 50 milliLiter(s) IV Intermittent <User Schedule> PRN sbp<=120mmhg  ALBUTerol   0.5% 2.5 milliGRAM(s) Nebulizer every 4 hours PRN Shortness of Breath and/or Wheezing  diphenhydrAMINE   Capsule 25 milliGRAM(s) Oral at bedtime PRN sleep  fluticasone propionate 50 MICROgram(s)/spray Nasal Spray 1 Spray(s) Both Nostrils two times a day PRN runny nose  HYDROmorphone  Injectable 0.5 milliGRAM(s) IV Push every 6 hours PRN Severe Pain (7 - 10)  ondansetron Injectable 4 milliGRAM(s) IV Push every 6 hours PRN Nausea and/or Vomiting      Allergies    Zosyn (Rash)    Intolerances        SOCIAL HISTORY:NC    FAMILY HISTORY:  No pertinent family history in first degree relatives      REVIEW OF SYSTEMS:    CONSTITUTIONAL: No weakness, fevers or chills  EYES/ENT: No visual changes;  No vertigo or throat pain   NECK: No pain or stiffness  RESPIRATORY: No cough, wheezing, hemoptysis; No shortness of breath  CARDIOVASCULAR: No chest pain or palpitations  GENITOURINARY: No dysuria, frequency or hematuria  NEUROLOGICAL: No numbness or weakness  SKIN: No itching, burning, rashes, or lesions   All other review of systems is negative unless indicated above.    Vital Signs Last 24 Hrs  T(C): 36.5 (01 Aug 2018 06:18), Max: 37 (31 Jul 2018 12:21)  T(F): 97.7 (01 Aug 2018 06:18), Max: 98.6 (31 Jul 2018 12:21)  HR: 75 (01 Aug 2018 06:18) (73 - 99)  BP: 121/40 (01 Aug 2018 06:18) (113/32 - 158/47)  BP(mean): --  RR: 18 (01 Aug 2018 06:18) (16 - 18)  SpO2: 97% (01 Aug 2018 06:18) (95% - 97%)    PHYSICAL EXAM:    Constitutional: NAD, well-developed  HEENT: EOMI, throat clear  Neck: No LAD, supple  Respiratory: CTA and P  Cardiovascular: S1 and S2, RRR, no M  Gastrointestinal: BS+, soft, mild RLQ tend/ND, neg HSM,  Extremities: No peripheral edema, neg clubing, cyanosis  Vascular: 2+ peripheral pulses  Neurological: A/O x 3, no focal deficits  Psychiatric: Normal mood, normal affect  Skin: No rashes    LABS:  CBC Full  -  ( 01 Aug 2018 06:58 )  WBC Count : 5.51 K/uL  Hemoglobin : 7.8 g/dL  Hematocrit : 25.7 %  Platelet Count - Automated : 193 K/uL  Mean Cell Volume : 97.7 fl  Mean Cell Hemoglobin : 29.7 pg  Mean Cell Hemoglobin Concentration : 30.4 gm/dL  Auto Neutrophil # : x  Auto Lymphocyte # : x  Auto Monocyte # : x  Auto Eosinophil # : x  Auto Basophil # : x  Auto Neutrophil % : x  Auto Lymphocyte % : x  Auto Monocyte % : x  Auto Eosinophil % : x  Auto Basophil % : x    07-31    141  |  111<H>  |  47<H>  ----------------------------<  78  3.6   |  22  |  4.88<H>    Ca    7.5<L>      31 Jul 2018 07:07  Phos  3.8     07-31  Mg     1.9     07-31    TPro  x   /  Alb  2.2<L>  /  TBili  x   /  DBili  x   /  AST  x   /  ALT  x   /  AlkPhos  x   07-31            RADIOLOGY & ADDITIONAL STUDIES:  < from: Xray Chest 1 View- PORTABLE-Routine (08.01.18 @ 10:09) >    EXAM:  XR CHEST PORTABLE ROUTINE 1V                            PROCEDURE DATE:  08/01/2018          INTERPRETATION:  Exam Date: 8/1/2018 10:09 AM    Chest radiograph (one view)         CLINICAL INFORMATION:  CHF follow-up    TECHNIQUE:  Single frontal view of the chest was obtained.    COMPARISON: July 20, 2018    FINDINGS/  IMPRESSION:          Pulmonary vascular congestion and small left pleural effusion is present.   Cardiomediastinal silhouette is intact.    < end of copied text >

## 2018-08-02 NOTE — PROGRESS NOTE ADULT - ASSESSMENT
c diff colitis  By mouth vancomycin, high dose   case discussed with  family  Patient with continued abdominal pain and possible inc diarrhea, improving slowly      Vanco taper eventually. However At this time, would continue high-dose vancomycin with Flagyl. Is improving slowly on it.  Encourage by mouth intake, encourage physical therapy    cough/sneeze, clinical FU and hospitalist management          A fibrillation    Would hold anticoagulation secondary to history bleeding and colitis    COPD obesity, obstructive sleep apnea, coronary artery disease, overall comorbid risk factors     IVF per renal  HD as needed      acites, SP paracentesis

## 2018-08-02 NOTE — PROGRESS NOTE ADULT - ASSESSMENT
83M w/ PMH of Afib, CHF, CAD s/p stents, COPD, recent PNA on abx, upper GI bleed (duodenal) was admitted for recurrent c. diff colitis. Currently stable. No stool production overnight. CXR shows pulmonary congestion and mild plural effusion. 83M w/ PMH of Afib, CHF, CAD s/p stents, COPD, recent PNA on abx, upper GI bleed (duodenal) was admitted for recurrent c. diff colitis. Currently stable. No stool production overnight. CXR shows pulmonary congestion and mild plural effusion. US shows small to moderate amount of abdominal ascites, greatest in the LLQ. -

## 2018-08-02 NOTE — PROGRESS NOTE ADULT - SUBJECTIVE AND OBJECTIVE BOX
CHIEF COMPLAINT: Diarrhea * 3 weeks    SUBJECTIVE:   HPI: This is a 84 y/o M w/pmhx of Afib, CHF, CAD s/p stents, COPD, recent PNA on abx, upper GI bleed (duodenal)  BIB EMS from [facility] for fever, abd pain, and diarrhea that began 3 weeks ago. Was in the hospital for PNA tx and was later dx with C-diff and started on a course of PO vancomycin for 10 days for the C-diff. States that he has had diarrhea since before the course of abx. Pain has been increased for the past few weeks and is characterized as a cramping feeling. Daughters also note that there is increased distension. Also notes chest pain characterized as a cramping in the central chest. 83% O2 sat noted morning of admission on 7/6/18, at the [facility] living facility. Takes O2 routinely at night but not during the day.    8/2/18:  Pt was seen and evaluated. Pt feels better overall. Has no acute complaints. No bowel movement overnight. Diarrhea clinically improved. Mild right lower quadrant pain. no n/v.     REVIEW OF SYSTEMS:  CONSTITUTIONAL: No weakness, fevers or chills  EYES/ENT: No visual changes;  No vertigo or throat pain   NECK: No pain or stiffness  RESPIRATORY: No cough, wheezing, hemoptysis; No shortness of breath  CARDIOVASCULAR: No chest pain or palpitations  GASTROINTESTINAL: No abdominal or epigastric pain. No nausea, vomiting, or hematemesis; No diarrhea or constipation. No melena or hematochezia.  GENITOURINARY: No dysuria, frequency or hematuria  NEUROLOGICAL: No numbness or weakness  SKIN: No itching, burning, rashes, or lesions   All other review of systems is negative unless indicated above    Vital Signs Last 24 Hrs  T(C): 36.5 (02 Aug 2018 10:30), Max: 37 (02 Aug 2018 05:16)  T(F): 97.7 (02 Aug 2018 10:30), Max: 98.6 (02 Aug 2018 05:16)  HR: 74 (02 Aug 2018 10:30) (74 - 85)  BP: 138/43 (02 Aug 2018 10:30) (117/29 - 144/66)  BP(mean): --  RR: 18 (02 Aug 2018 10:30) (18 - 18)  SpO2: 98% (02 Aug 2018 10:30) (94% - 99%)    I&O's Summary    01 Aug 2018 07:01  -  02 Aug 2018 07:00  --------------------------------------------------------  IN: 0 mL / OUT: 75 mL / NET: -75 mL        CAPILLARY BLOOD GLUCOSE          PHYSICAL EXAM:  Constitutional: NAD.  HEENT: PERR, Normal Hearing  Respiratory: Breath sounds are clear bilaterally.  Cardiovascular: S1 and S2, RRR.  Gastrointestinal: Bowel Sounds present, soft, tender, distended abdomen.  Vascular: 2+ peripheral pulses  Neurological: A/O x 3, no focal deficits      MEDICATIONS:  MEDICATIONS  (STANDING):  ALBUTerol/ipratropium for Nebulization. 3 milliLiter(s) Nebulizer once  allopurinol 100 milliGRAM(s) Oral daily  aspirin  chewable 81 milliGRAM(s) Oral daily  buDESOnide   0.5 milliGRAM(s) Respule 0.5 milliGRAM(s) Inhalation two times a day  cholestyramine Powder (Sugar-Free) 4 Gram(s) Oral two times a day  diltiazem    Tablet 30 milliGRAM(s) Oral every 6 hours  doxazosin 8 milliGRAM(s) Oral at bedtime  epoetin nelly Injectable 3000 Unit(s) IV Push <User Schedule>  epoetin nelly Injectable 4000 Unit(s) IV Push <User Schedule>  finasteride 5 milliGRAM(s) Oral daily  heparin  Injectable 5000 Unit(s) SubCutaneous every 12 hours  metoclopramide 5 milliGRAM(s) Oral three times a day  metroNIDAZOLE  IVPB 250 milliGRAM(s) IV Intermittent every 8 hours  pantoprazole    Tablet 40 milliGRAM(s) Oral every 12 hours  simvastatin 10 milliGRAM(s) Oral at bedtime  sodium ferric gluconate complex Injectable 125 milliGRAM(s) IV Push <User Schedule>  vancomycin    Solution 500 milliGRAM(s) Oral every 6 hours      LABS: All Labs Reviewed:                        7.6    5.93  )-----------( 190      ( 02 Aug 2018 06:52 )             25.7     08-02    140  |  108  |  34<H>  ----------------------------<  75  3.9   |  24  |  4.09<H>    Ca    7.7<L>      02 Aug 2018 06:52  Phos  3.0     08-02  Mg     1.9     08-02    TPro  x   /  Alb  2.2<L>  /  TBili  x   /  DBili  x   /  AST  x   /  ALT  x   /  AlkPhos  x   08-02          Blood Culture: 07-31 @ 06:00  Organism --  Gram Stain Blood -- Gram Stain --  Specimen Source .Urine Clean Catch (Midstream)  Culture-Blood --        RADIOLOGY/EKG:    DVT PPX:    ADVANCED DIRECTIVE:    DISPOSITION:

## 2018-08-02 NOTE — PROGRESS NOTE ADULT - SUBJECTIVE AND OBJECTIVE BOX
NEPHROLOGY INTERVAL HPI/OVERNIGHT EVENTS    8/2  OOB  feels well  poor appetite  ne new events  minimal cloudy urine     8/1  no new c/o doing well  still with uop of 75-100ml   diarrhea 1x per day  appetite remains poor    MEDICATIONS  (STANDING):  ALBUTerol/ipratropium for Nebulization. 3 milliLiter(s) Nebulizer once  allopurinol 100 milliGRAM(s) Oral daily  aspirin  chewable 81 milliGRAM(s) Oral daily  buDESOnide   0.5 milliGRAM(s) Respule 0.5 milliGRAM(s) Inhalation two times a day  cholestyramine Powder (Sugar-Free) 4 Gram(s) Oral two times a day  diltiazem    Tablet 30 milliGRAM(s) Oral every 6 hours  doxazosin 8 milliGRAM(s) Oral at bedtime  epoetin nelly Injectable 3000 Unit(s) IV Push <User Schedule>  epoetin nelly Injectable 4000 Unit(s) IV Push <User Schedule>  finasteride 5 milliGRAM(s) Oral daily  heparin  Injectable 5000 Unit(s) SubCutaneous every 12 hours  metoclopramide 5 milliGRAM(s) Oral three times a day  metroNIDAZOLE  IVPB 250 milliGRAM(s) IV Intermittent every 8 hours  pantoprazole    Tablet 40 milliGRAM(s) Oral every 12 hours  simvastatin 10 milliGRAM(s) Oral at bedtime  sodium ferric gluconate complex Injectable 125 milliGRAM(s) IV Push <User Schedule>  vancomycin    Solution 500 milliGRAM(s) Oral every 6 hours    MEDICATIONS  (PRN):  acetaminophen   Tablet 650 milliGRAM(s) Oral every 8 hours PRN For Temp greater than 38 C (100.4 F)  acetaminophen   Tablet. 650 milliGRAM(s) Oral every 8 hours PRN Mild Pain (1 - 3)  albumin human 25% IVPB 50 milliLiter(s) IV Intermittent <User Schedule> PRN sbp<=120mmhg  ALBUTerol   0.5% 2.5 milliGRAM(s) Nebulizer every 4 hours PRN Shortness of Breath and/or Wheezing  diphenhydrAMINE   Capsule 25 milliGRAM(s) Oral at bedtime PRN sleep  fluticasone propionate 50 MICROgram(s)/spray Nasal Spray 1 Spray(s) Both Nostrils two times a day PRN runny nose  HYDROmorphone  Injectable 0.5 milliGRAM(s) IV Push every 6 hours PRN Severe Pain (7 - 10)  ondansetron Injectable 4 milliGRAM(s) IV Push every 6 hours PRN Nausea and/or Vomiting      Allergies    Zosyn (Rash)    Intolerances        I&O's Detail    31 Jul 2018 07:01  -  01 Aug 2018 07:00  --------------------------------------------------------  IN:    IV PiggyBack: 100 mL    Oral Fluid: 420 mL  Total IN: 520 mL    OUT:  Total OUT: 0 mL    Total NET: 520 mL          Vital Signs Last 24 Hrs  T(C): 36.5 (02 Aug 2018 10:30), Max: 37 (02 Aug 2018 05:16)  T(F): 97.7 (02 Aug 2018 10:30), Max: 98.6 (02 Aug 2018 05:16)  HR: 74 (02 Aug 2018 10:30) (74 - 85)  BP: 138/43 (02 Aug 2018 10:30) (117/29 - 144/66)  BP(mean): --  RR: 18 (02 Aug 2018 10:30) (18 - 18)  SpO2: 98% (02 Aug 2018 10:30) (94% - 99%)      PHYSICAL EXAM:  General: alert. awake Ox3  HEENT: MMM  CV: s1s2 rrr  LUNGS: B/L CTA  EXT: no edema    LABS:                          7.6    5.93  )-----------( 190      ( 02 Aug 2018 06:52 )             25.7       08-02    140  |  108  |  34<H>  ----------------------------<  75  3.9   |  24  |  4.09<H>    Ca    7.7<L>      02 Aug 2018 06:52  Phos  3.0     08-02  Mg     1.9     08-02    TPro  x   /  Alb  2.2<L>  /  TBili  x   /  DBili  x   /  AST  x   /  ALT  x   /  AlkPhos  x   08-02

## 2018-08-02 NOTE — PROGRESS NOTE ADULT - ASSESSMENT
82 y/o wm with hx of ckd stage 3 ( scr 1.5-1.7) with ARYA due to volume depletion from CDiff associated colitis and diarrhea in presence of diuretic use PTA.  Now with non anion gap metabolic acidosis and worsening renal parameters.  CXR with mild CHF with hx of diastolic CHF.    PLAN  - obtain abd and lactate  - will change ivf and add bicarbonate and keep at current rate  - hold lasix done.   - fu uop and scr closely and will monitor respiratory status  - bp and hr stable now, cardizem adjusted and lopressor added    7/9 MK  - ARYA: worsening renal parameters with metabolic acidosis, non ag.  Previous lactate WNL and since placed on bicarbonate drip  fu repeat abg today.  Increase IVF rate  possibility of HD discussed with enio, hcp daughter, pt has been agreeable in past.   DC hydralazine  dc morphine and change to dilaudid  - Cdiff with rising scr and worsening abd distension: CRS consult ongoing  possible repeat ct scan  DW Dr ballesteros    7/10  Anuric  anasarca  Non anion gap acidosis on bicarb drip  ARYA, anuric  CKD 3 history  d/w Family, and pt agreeable  called ICU for line placement and to dialyze the pt in ICU for monitoring    7/10  HD initiated via R temp HD catheter  tolerating well  no complaints  good abf    7/11 SY  --ARYA with Anasarca.  Urine output slightly improved.  However, remains quite fluid overloaded.  Will plan to continue HD until fluid status is much improved.  HD order written.  3 hour tx today.  Will aim to remove 2.5 kg as tolerated.  --C DIff colitis ; continue to monitor closely.    7/12 SY  --ARYA with Anasarca.   Remains Oligo-anuric.    --HD with goal of 2.5 kg UF again today.  --C Diff colitis.   Clinically improved   Continue to monitor.    7/13  The patient was dialyzed 3 days in a row this week  Discussed with the patient's daughter and hospitalist, intensivist  Will skip dialysis today, no urgent need for dialysis  We'll dialyze the patient tomorrow on Saturday and then skip Sunday to dialyze the patient again on Monday.    7/14  We'll dialyze against 4 K bath  Case discussed with the patient's daughter  May need a permanent catheter by Monday      7/15  Dialyze with a 4K bath  Potassium is 3.3 most likely from the diarrhea  Schedule for permacath placement after dialysis on Monday or Tuesday  Patient is comfortable no complications noted at this time    7/16 MK  - ARYA/ CKD stage 3 remains oliguirc and HD dependent.  LImited UF at HD toleated with the   hypokalemia corrected with HD.  Permcath planned today  - Cdiff: on PO vanc.  improved leukocytosis and abd distension     7/17  s/p perm cath  HD tomorrow  4 K bath  replace K   overall stable    7/18 SY  --ARYA/CKD for now remaining dialysis dependent.  Continue TIW HD.  --C Diff colitis ; improving clinically.    7/19 SY  --ARYA/CKD : Continue TIW HD.   Will continue as outpatient for now as no signs of recovery at this point.  --C Diff colitis  : clinically improving.  Continue po abtx.  --D/c Planning  D/w pt's daughter re : rehab with HD.  --Replete K.  Continue regular diet without restrictions for now.    7/20 MK  - ARYA/CKD remains HD dependent with oliguria.  Will attempt UF with HD.  K correction with HD and changed to regular diet with fluid restriction  - Cdiff: PO vanc with improving renal paramenters-  - Episodes of Desat: will have pulmonary re-eval prior to dc.  - DC planning rehab with dialysis  dw Dr Andrade  will see pt    7/21 MK  - ARYA/CKD remains hd dependent. post hd cxr with improved PVC.  Still with episodes of desaturation and dr andrade input noted.  CT with evidence of ascites for paracentesis  - cdiff: on po vanc.  still with loose stools,     7/22 MK  - ARYA/CKD remains HD dependent.  Will coordinate AM HD based upon timing of paracentesis, hypokalemia improved  - hypoxia with ascites: for paracentesis   - cdif on po vanc, rectal tube in place    7/23 MK  - ARYA/CKD remains HD dependent, toelrating hd.  + inc uop   - hypoxia with ascites: s/p paracentesis today, monitor response to oxygenation  - AOCD; fu trend of h/h, on epogen  - cdiff: po vanc    7/24 SY  --ARYA/CKD  Urine output may be increasing.  Follow creat trend to determine further dialysis support.  --Post Paracentesis : improve resp status.  --Anemia : continue LETICIA.  --C Diff : continue po Vanco.    7/25 SY  --ARYA/CKD  Monitor urine output.  Noted with increased creat.  Will maintain on TIW HD since fluid overloaded state is persistent.  --Monitor GI function.  --Post Paracentesis : resp status stabilized.  --Anemia :continue LETICIA.    7/26 SY  --ARYA/CKD  : Creat slowly  trending down.  Urine output not recorded.  Check creat in am to determine whether HD can be held off.  --Recurrent abdominal pain of concern.  Being assessed by primary team and GI.  --Bladder scan to assure full void.    7/27 MK  - ARYA/CKD: with + inc Uop and significant volume depletion overnight.  Bladder scan of 77 ml with stable respiratory status.  NO Hd today and reassess in the AM.      7/28 SY  --ARYA /CKD : positive increase in urine output. However, renal parameters remain elevated.  Therefore, will maintain on TIW HD schedule for now.  --Pt's fluid status has much improved.   Will HD today with no UF and allow increased fluid intake without restrictions.  --C DIff : continue to monitor and continue po abtx.    7/29 SY  --ARYA/CKD   post HD yesterday.   Continue to monitor for signs of renal recovery.  --Fluid overloaded state much improved.  --Monitor on regular diet.  --C Diff : continue po abtx.    7/30 MK  - ARYA/CKD, with stable renal parameters Unclear if with increasing UOP.  Will hold off on HD tomorrow and assess in AM  - Cloudy urine with white sputum production: fu with ua and urine cultures    7/31  minimal cloudy urine   creat up to 4.88  Abd distended \examined w Dr zendejas  Will dialyze today for fluid removal as tolerated, 4 K bath  May need paracentesis as per Dr Zendejas    8/1 MK  - ARYA/CKD will assess in am need for HD  - cdiff; on po vanc/iv flagyll    8/2  HD in am 8/3  t/c for possible HD if drops further  feels better overall

## 2018-08-02 NOTE — PROGRESS NOTE ADULT - SUBJECTIVE AND OBJECTIVE BOX
Patient is a 83y old  Male who presents with a chief complaint of complain of diarrhea, fever (06 Jul 2018 23:55)  Date of service: 08-02-18 @ 11:19    Patient sitting in chair; had one bowel movement overnite        ROS: no fever or chills; denies dizziness, no HA, no SOB or cough, no abdominal pain, no diarrhea or constipation; no dysuria, no urinary frequency, no legs pain, no rashes    MEDICATIONS  (STANDING):  ALBUTerol/ipratropium for Nebulization. 3 milliLiter(s) Nebulizer once  allopurinol 100 milliGRAM(s) Oral daily  aspirin  chewable 81 milliGRAM(s) Oral daily  buDESOnide   0.5 milliGRAM(s) Respule 0.5 milliGRAM(s) Inhalation two times a day  cholestyramine Powder (Sugar-Free) 4 Gram(s) Oral two times a day  diltiazem    Tablet 30 milliGRAM(s) Oral every 6 hours  doxazosin 8 milliGRAM(s) Oral at bedtime  epoetin nelly Injectable 3000 Unit(s) IV Push <User Schedule>  epoetin nelly Injectable 4000 Unit(s) IV Push <User Schedule>  finasteride 5 milliGRAM(s) Oral daily  heparin  Injectable 5000 Unit(s) SubCutaneous every 12 hours  metoclopramide 5 milliGRAM(s) Oral three times a day  metroNIDAZOLE  IVPB 250 milliGRAM(s) IV Intermittent every 8 hours  pantoprazole    Tablet 40 milliGRAM(s) Oral every 12 hours  simvastatin 10 milliGRAM(s) Oral at bedtime  sodium ferric gluconate complex Injectable 125 milliGRAM(s) IV Push <User Schedule>  vancomycin    Solution 500 milliGRAM(s) Oral every 6 hours    MEDICATIONS  (PRN):  acetaminophen   Tablet 650 milliGRAM(s) Oral every 8 hours PRN For Temp greater than 38 C (100.4 F)  acetaminophen   Tablet. 650 milliGRAM(s) Oral every 8 hours PRN Mild Pain (1 - 3)  albumin human 25% IVPB 50 milliLiter(s) IV Intermittent <User Schedule> PRN sbp<=120mmhg  ALBUTerol   0.5% 2.5 milliGRAM(s) Nebulizer every 4 hours PRN Shortness of Breath and/or Wheezing  diphenhydrAMINE   Capsule 25 milliGRAM(s) Oral at bedtime PRN sleep  fluticasone propionate 50 MICROgram(s)/spray Nasal Spray 1 Spray(s) Both Nostrils two times a day PRN runny nose  ondansetron Injectable 4 milliGRAM(s) IV Push every 6 hours PRN Nausea and/or Vomiting      Vital Signs Last 24 Hrs  T(C): 36.5 (02 Aug 2018 10:30), Max: 37 (02 Aug 2018 05:16)  T(F): 97.7 (02 Aug 2018 10:30), Max: 98.6 (02 Aug 2018 05:16)  HR: 74 (02 Aug 2018 10:30) (74 - 85)  BP: 138/43 (02 Aug 2018 10:30) (117/29 - 144/66)  BP(mean): --  RR: 18 (02 Aug 2018 10:30) (18 - 18)  SpO2: 98% (02 Aug 2018 10:30) (94% - 99%)    Physical Exam:      PE:    Constitutional: frail looking  HEENT: NC/AT, EOMI, PERRLA, conjunctivae clear; ears and nose atraumatic; pharynx clear  Neck: supple; thyroid not palpable  Respiratory: respiratory effort normal; clear to auscultation  Cardiovascular: S1S2 regular, no murmurs  Abdomen: soft, right lower quadrant tender, difusely distended, positive BS; no liver or spleen organomegaly  Genitourinary: no suprapubic tenderness  Musculoskeletal: s/p right lower extremity amputation; left lower extremity with dressing in place  Neurological/ Psychiatric: AxOx3, judgement and insight normal;  moving all extremities  Skin: no rashes; no palpable lesions    Labs: all available labs reviewed                        Labs:           Labs:                        7.6    5.93  )-----------( 190      ( 02 Aug 2018 06:52 )             25.7     08-02    140  |  108  |  34<H>  ----------------------------<  75  3.9   |  24  |  4.09<H>    Ca    7.7<L>      02 Aug 2018 06:52  Phos  3.0     08-02  Mg     1.9     08-02    TPro  x   /  Alb  2.2<L>  /  TBili  x   /  DBili  x   /  AST  x   /  ALT  x   /  AlkPhos  x   08-02           Cultures:       Culture - Urine (collected 07-31-18 @ 06:00)  Source: .Urine Clean Catch (Midstream)  Final Report (08-01-18 @ 14:46):    Culture grew 3 or more types of organisms which indicate    collection contamination; consider recollection only if clinically    indicated.          Clostridium difficile Toxin by PCR (07.06.18 @ 16:19)    C Diff by PCR Result: Detected    Clostridium difficile Toxin by PCR: The results of this test should be interpreted with consideration of all  clinical and laboratory findings. This test determines the presence of  the C. difficile tcdB gene at a given time and is not intended to  identify antibiotic associated disease or C. difficile infection without  clinical context. Successful treatment is based on the resolution of  clinical symptoms. This test should not be used as a "test of cure"  because C. difficile DNA will persist after successful treatment. Repeat  testing will not be permitted.    This test is performed on the BD MAX system using Real-Time PCR and  fluorogenic target-specific hybridization.        < from: CT Abdomen and Pelvis w/ Oral Cont (07.06.18 @ 18:03) >    IMPRESSION:     Severe pancolitis consistent with pseudomembranous colitis.    Mild pulmonary edema.    Small loculated right and trace layering left pleural effusions.      < end of copied text >    < from: CT Abdomen and Pelvis No Cont (07.21.18 @ 11:15) >    IMPRESSION:     Stable appearance of pancolitis consistent with pseudomembranous colitis.   No pneumatosis or free air.    Increasing moderate ascites.    Anasarca.    Urinary bladder cystitis. Correlate with UA.      < end of copied text >        Radiology: all available radiological tests reviewed    Advanced directives addressed: full resuscitation

## 2018-08-02 NOTE — PROGRESS NOTE ADULT - PROBLEM SELECTOR PLAN 2
- continue to maintain fluid balance with HD MWF   - control HFpEF with medical management   - O2 as needed  - Pulm appreciated, add nebs  - Incentive Spirometry  - Moderate Ascites - contributing to desaturation; s/p Paracentesis 7/23/18 w/ 2.8 L fluid removed, improved respiratory status   - 4L NC O2 at night and on exertion as needed  - Hourizadeh appreciated, no longer needs BIPAP. - continue to maintain fluid balance with HD MWF   - control HFpEF with medical management   - O2 as needed  - As per GI, no paracentesis needed for ascites since there is no respiratory distress.  - Pulm appreciated, add nebs  - Incentive Spirometry  - Moderate Ascites - contributing to desaturation; s/p Paracentesis 7/23/18 w/ 2.8 L fluid removed, improved respiratory status   - 4L NC O2 at night and on exertion as needed  - Hourizadeh appreciated, no longer needs BIPAP.

## 2018-08-03 LAB
ALBUMIN SERPL ELPH-MCNC: 2.6 G/DL — LOW (ref 3.3–5)
ANION GAP SERPL CALC-SCNC: 10 MMOL/L — SIGNIFICANT CHANGE UP (ref 5–17)
BUN SERPL-MCNC: 39 MG/DL — HIGH (ref 7–23)
CALCIUM SERPL-MCNC: 7.8 MG/DL — LOW (ref 8.5–10.1)
CHLORIDE SERPL-SCNC: 110 MMOL/L — HIGH (ref 96–108)
CO2 SERPL-SCNC: 23 MMOL/L — SIGNIFICANT CHANGE UP (ref 22–31)
CREAT SERPL-MCNC: 3.81 MG/DL — HIGH (ref 0.5–1.3)
GLUCOSE SERPL-MCNC: 116 MG/DL — HIGH (ref 70–99)
HCT VFR BLD CALC: 26.4 % — LOW (ref 39–50)
HGB BLD-MCNC: 7.9 G/DL — LOW (ref 13–17)
MAGNESIUM SERPL-MCNC: 2 MG/DL — SIGNIFICANT CHANGE UP (ref 1.6–2.6)
MCHC RBC-ENTMCNC: 29.7 PG — SIGNIFICANT CHANGE UP (ref 27–34)
MCHC RBC-ENTMCNC: 29.9 GM/DL — LOW (ref 32–36)
MCV RBC AUTO: 99.2 FL — SIGNIFICANT CHANGE UP (ref 80–100)
NRBC # BLD: 0 /100 WBCS — SIGNIFICANT CHANGE UP (ref 0–0)
PHOSPHATE SERPL-MCNC: 2.7 MG/DL — SIGNIFICANT CHANGE UP (ref 2.5–4.5)
PLATELET # BLD AUTO: 232 K/UL — SIGNIFICANT CHANGE UP (ref 150–400)
POTASSIUM SERPL-MCNC: 3.7 MMOL/L — SIGNIFICANT CHANGE UP (ref 3.5–5.3)
POTASSIUM SERPL-SCNC: 3.7 MMOL/L — SIGNIFICANT CHANGE UP (ref 3.5–5.3)
RBC # BLD: 2.66 M/UL — LOW (ref 4.2–5.8)
RBC # FLD: 19 % — HIGH (ref 10.3–14.5)
SODIUM SERPL-SCNC: 143 MMOL/L — SIGNIFICANT CHANGE UP (ref 135–145)
WBC # BLD: 7.06 K/UL — SIGNIFICANT CHANGE UP (ref 3.8–10.5)
WBC # FLD AUTO: 7.06 K/UL — SIGNIFICANT CHANGE UP (ref 3.8–10.5)

## 2018-08-03 PROCEDURE — 93010 ELECTROCARDIOGRAM REPORT: CPT

## 2018-08-03 RX ORDER — ERYTHROPOIETIN 10000 [IU]/ML
10000 INJECTION, SOLUTION INTRAVENOUS; SUBCUTANEOUS ONCE
Qty: 0 | Refills: 0 | Status: COMPLETED | OUTPATIENT
Start: 2018-08-03 | End: 2018-08-03

## 2018-08-03 RX ADMIN — Medication 5 MILLIGRAM(S): at 05:34

## 2018-08-03 RX ADMIN — Medication 500 MILLIGRAM(S): at 05:34

## 2018-08-03 RX ADMIN — CHOLESTYRAMINE 4 GRAM(S): 4 POWDER, FOR SUSPENSION ORAL at 08:16

## 2018-08-03 RX ADMIN — Medication 500 MILLIGRAM(S): at 17:57

## 2018-08-03 RX ADMIN — Medication 100 MILLIGRAM(S): at 17:56

## 2018-08-03 RX ADMIN — PANTOPRAZOLE SODIUM 40 MILLIGRAM(S): 20 TABLET, DELAYED RELEASE ORAL at 17:55

## 2018-08-03 RX ADMIN — HEPARIN SODIUM 5000 UNIT(S): 5000 INJECTION INTRAVENOUS; SUBCUTANEOUS at 05:34

## 2018-08-03 RX ADMIN — Medication 5 MILLIGRAM(S): at 22:40

## 2018-08-03 RX ADMIN — ERYTHROPOIETIN 10000 UNIT(S): 10000 INJECTION, SOLUTION INTRAVENOUS; SUBCUTANEOUS at 17:18

## 2018-08-03 RX ADMIN — SIMVASTATIN 10 MILLIGRAM(S): 20 TABLET, FILM COATED ORAL at 22:40

## 2018-08-03 RX ADMIN — Medication 500 MILLIGRAM(S): at 11:42

## 2018-08-03 RX ADMIN — PANTOPRAZOLE SODIUM 40 MILLIGRAM(S): 20 TABLET, DELAYED RELEASE ORAL at 05:34

## 2018-08-03 RX ADMIN — CHOLESTYRAMINE 4 GRAM(S): 4 POWDER, FOR SUSPENSION ORAL at 21:15

## 2018-08-03 RX ADMIN — Medication 81 MILLIGRAM(S): at 18:00

## 2018-08-03 RX ADMIN — FINASTERIDE 5 MILLIGRAM(S): 5 TABLET, FILM COATED ORAL at 18:00

## 2018-08-03 RX ADMIN — Medication 125 MILLIGRAM(S): at 17:10

## 2018-08-03 RX ADMIN — Medication 0.5 MILLIGRAM(S): at 07:58

## 2018-08-03 RX ADMIN — Medication 8 MILLIGRAM(S): at 22:40

## 2018-08-03 RX ADMIN — HEPARIN SODIUM 5000 UNIT(S): 5000 INJECTION INTRAVENOUS; SUBCUTANEOUS at 17:55

## 2018-08-03 NOTE — PROGRESS NOTE ADULT - PROBLEM SELECTOR PLAN 1
- Vancomycin po.  - Cont flagyl  mg q8h (restarted on 7/27)   -ID recs: limit abx exposure, cont current abx regimen, contact isolation, do not add new abx for cystitis  -GI recs: cont current abx for now. Hold a/c due to risk of bleeding.  - D/C planning for Monday 7/6/18 pending HD arrangements for outpatient.

## 2018-08-03 NOTE — PROGRESS NOTE ADULT - SUBJECTIVE AND OBJECTIVE BOX
Patient is a 83y old  Male who presents with a chief complaint of complain of diarrhea, fever (06 Jul 2018 23:55)      HPI:  Pt is a 84 y/o M w/pmhx of Afib, CHF, CAD s/p stents, COPD, PNA, upper GI bleed (duodenal)  BIB EMS from [facility] assisted living for fever, abd pain, and diarrhea that began 3 weeks ago. Was in the hospital for PNA tx and was later dx with C-diff and started on a course of PO vancomycin for 10 days for the C-diff. States that he has had diarrhea since before the course of abx. Pain has been increased for the past few weeks and is characterized as a cramping feeling. Daughters also note that there is increased distension. Also notes chest pain characterized as a cramping in the central chest. 83% O2 sat noted this morning at the assisted living facility. Takes O2 routinely at night but not during the day. (06 Jul 2018 23:55)  Patient's daughter states he was treated for ESBL with antibiotics prior to this  7/10  seen and examined with his dter at bedside  hx of resp failure and intubation 2012 x 3-4 days  feels ok with breathing now  being evaluated for surgical interventions with pancolitis  possibility of post op need for vent support discussed witrh pt and his dtr  he wants to proceed understanding high risk for surgery due to compromised medical condition  hx SHAYY and treated with BIPAP in the past / stopped using it after wt loss  7/11  seen in ICU with dtr at bedside  data discussed with critical care RN  s/p Ross and hemodialysis  still with abd distention  family wants to wait on abd surgery given he is high risk  7/12  family at bedside updated  no cp  no difficulty breathing  intermittent wheezing  7/13  NGT is discontinued  dtr at bedside updated  no issue with breathing now  7/14  having HD  some po intake tolerated ok  dtr at bedside updated  no active pulm issues  7/15  resting comfortably  no distress  Gi and renal eval noted  7/16  feels better  having dialysis  awake and alert  po intake tolerated well  7/17  dtr at bedside  s/lauren tate placed right chest  no cp    7/21  I was asked by Dr Tati Tomas to re eval for reported episodes of desaturations  DTR at bedside  data discussed at length  pt's reported hypoxemic episodes after narcotics / while sleeping or having abd distention  he has known hx SHAYY / OHS and prior BIPAP 12/8 with O2 attached at home  this am after 35% VM was removed in his room, RA O2 sat 88%-93% range  no distress  no significant cough  occasional wheeze    7/22  feels the same  family updated at bedside  reported plan for paracentesis in am  vomited once  7/23  data rev with critical care RN at bedside this am  overnight desat required 35% VM, o2 sat this am 93%  no distress  sleeping  plan for paracentesis   7/24  seen on [address] [place] today  feels better  no issues with his breathing  s/p paracentesis  7/26  dtr at bedside updated today  no active pulm issues now  8/1  pt is seen and examined today  no active pulm sxs  no abd pain  awake and alert  8/2  data discussed with pt and his dtr today  feels the same  some residual abd pain  8/3  resting comfortably  no distress  uneventful  PAST MEDICAL & SURGICAL HISTORY:  Diastolic CHF  Peripheral vascular disease  Afib  Anemia  CKD (chronic kidney disease)  COPD (chronic obstructive pulmonary disease)  SHAYY (obstructive sleep apnea)  Sepsis, due to unspecified organism: 2/2 poorly healing wounds b/l  Dyspepsia: On moderate exertion.  Sleep apnea, obstructive: Requires home 02 therapy, and treatment with BIPAP  Atelectasis  Pleural effusion, bilateral  Respiratory failure  Peripheral edema  CRI (chronic renal insufficiency)  Gout  Benign prostatic hypertrophy  Spinal stenosis  Hypercholesterolemia  GERD (gastroesophageal reflux disease)  CAD (coronary artery disease)  Hypertension  S/P angioplasty with stent  Cataract of left eye  Prostate: Surgery green light procedure.  S/P rotator cuff surgery: Right  S/P angioplasty  MEDICATIONS  (STANDING):  ALBUTerol/ipratropium for Nebulization. 3 milliLiter(s) Nebulizer once  allopurinol 100 milliGRAM(s) Oral daily  aspirin  chewable 81 milliGRAM(s) Oral daily  buDESOnide   0.5 milliGRAM(s) Respule 0.5 milliGRAM(s) Inhalation two times a day  cholestyramine Powder (Sugar-Free) 4 Gram(s) Oral two times a day  diltiazem    Tablet 30 milliGRAM(s) Oral every 6 hours  doxazosin 8 milliGRAM(s) Oral at bedtime  epoetin nelly Injectable 3000 Unit(s) IV Push <User Schedule>  epoetin nelly Injectable 4000 Unit(s) IV Push <User Schedule>  finasteride 5 milliGRAM(s) Oral daily  heparin  Injectable 5000 Unit(s) SubCutaneous every 12 hours  metoclopramide 5 milliGRAM(s) Oral three times a day  pantoprazole    Tablet 40 milliGRAM(s) Oral every 12 hours  simvastatin 10 milliGRAM(s) Oral at bedtime  sodium ferric gluconate complex Injectable 125 milliGRAM(s) IV Push <User Schedule>  vancomycin    Solution 500 milliGRAM(s) Oral every 6 hours          MEDICATIONS  (PRN):  acetaminophen   Tablet 650 milliGRAM(s) Oral every 8 hours PRN For Temp greater than 38 C (100.4 F)  acetaminophen   Tablet. 650 milliGRAM(s) Oral every 8 hours PRN Mild Pain (1 - 3)  ALBUTerol   0.5% 2.5 milliGRAM(s) Nebulizer every 4 hours PRN Shortness of Breath and/or Wheezing  morphine  - Injectable 2 milliGRAM(s) IV Push every 6 hours PRN Severe Pain (7 - 10)  ondansetron Injectable 4 milliGRAM(s) IV Push every 6 hours PRN Nausea and/or Vomiting      FAMILY HISTORY:  No pertinent family history in first degree relatives    REVIEW OF SYSTEM:  abdominal pain, otherwise 12 point ROS negative     Vital Signs Last 24 Hrs  T(C): 36.6 (03 Aug 2018 05:10), Max: 36.6 (03 Aug 2018 05:10)  T(F): 97.9 (03 Aug 2018 05:10), Max: 97.9 (03 Aug 2018 05:10)  HR: 70 (03 Aug 2018 05:10) (70 - 85)  BP: 145/32 (03 Aug 2018 05:10) (132/55 - 145/32)  BP(mean): --  RR: 18 (03 Aug 2018 05:10) (18 - 18)  SpO2: 98% (03 Aug 2018 05:10) (95% - 98%)  PHYSICAL EXAM  General Appearance: cooperative, no acute distress,   HEENT: PERRL, conjunctiva clear, EOM's intact, non injected pharynx, no exudate, TM   normal  Neck: Supple, , no adenopathy, thyroid: not enlarged, no carotid bruit or JVD  Back: Symmetric, no  tenderness,no soft tissue tenderness  Lungs:IMPROVED Diminished bilaterally R>L with some dullness to percussion, mostly resolved  no wheezing  Heart: Regular rate and rhythm, S1, S2 normal, no murmur, rub or gallop  Abdomen:  non tender,  active bowel sounds, decreased Distention   Extremities: pos peripheral edema / Hx Right leg amputation  Skin: Skin color, texture normal, no rashes   Neurologic: Alert and oriented X3 , cranial nerves intact, sensory and motor normal,    ECG:    LABS:                                   7.6    5.93  )-----------( 190      ( 02 Aug 2018 06:52 )             25.7   08-02    140  |  108  |  34<H>  ----------------------------<  75  3.9   |  24  |  4.09<H>    Ca    7.7<L>      02 Aug 2018 06:52  Phos  3.0     08-02  Mg     1.9     08-02    TPro  x   /  Alb  2.2<L>  /  TBili  x   /  DBili  x   /  AST  x   /  ALT  x   /  AlkPhos  x   08-02               7.8    5.51  )-----------( 193      ( 01 Aug 2018 06:58 )             25.7   08-01    141  |  108  |  27<H>  ----------------------------<  85  3.8   |  26  |  3.20<H>    Ca    7.4<L>      01 Aug 2018 06:58  Phos  2.8     08-01  Mg     1.7     08-01    TPro  x   /  Alb  2.2<L>  /  TBili  x   /  DBili  x   /  AST  x   /  ALT  x   /  AlkPhos  x   07-31                          7.7    4.32  )-----------( 203      ( 24 Jul 2018 07:08 )             25.3   07-24    142  |  106  |  29<H>  ----------------------------<  89  3.6   |  27  |  3.46<H>    Ca    7.4<L>      24 Jul 2018 07:08  Phos  5.4     07-23  Mg     1.9     07-24    TPro  4.7<L>  /  Alb  2.3<L>  /  TBili  x   /  DBili  x   /  AST  x   /  ALT  x   /  AlkPhos  x   07-23                                   8.0    6.95  )-----------( 247      ( 22 Jul 2018 06:19 )             26.7   07-22    144  |  111<H>  |  33<H>  ----------------------------<  104<H>  4.1   |  25  |  4.10<H>    Ca    7.3<L>      22 Jul 2018 06:19  Mg     1.8     07-22    TPro  4.4<L>  /  Alb  2.1<L>  /  TBili  0.4  /  DBili  x   /  AST  7<L>  /  ALT  <6<L>  /  AlkPhos  48  07-22               7.5    9.09  )-----------( 225      ( 21 Jul 2018 06:08 )             24.5   07-21    139  |  107  |  24<H>  ----------------------------<  101<H>  3.2<L>   |  28  |  3.31<H>    Ca    7.5<L>      21 Jul 2018 06:08  Phos  4.1     07-20  Mg     1.9     07-21    TPro  x   /  Alb  2.1<L>  /  TBili  x   /  DBili  x   /  AST  x   /  ALT  x   /  AlkPhos  x   07-20                                   8.4    10.13 )-----------( 199      ( 17 Jul 2018 05:41 )             26.8   07-17    142  |  108  |  19  ----------------------------<  89  3.4<L>   |  25  |  3.02<H>    Ca    7.2<L>      17 Jul 2018 05:41                 8.4    8.91  )-----------( 181      ( 15 Jul 2018 06:16 )             26.4   07-15    142  |  107  |  23  ----------------------------<  101<H>  3.3<L>   |  26  |  3.04<H>    Ca    7.5<L>      15 Jul 2018 06:16                            8.2    10.15 )-----------( 183      ( 13 Jul 2018 05:14 )             26.1   07-13    143  |  107  |  26<H>  ----------------------------<  89  3.5   |  26  |  2.62<H>    Ca    7.2<L>      13 Jul 2018 05:14  Phos  4.3     07-12  Mg     2.0     07-12    TPro  x   /  Alb  2.0<L>  /  TBili  x   /  DBili  x   /  AST  x   /  ALT  x   /  AlkPhos  x   07-12                            8.3    10.62 )-----------( 202      ( 12 Jul 2018 05:17 )             26.8   07-12    141  |  107  |  33<H>  ----------------------------<  66<L>  3.9   |  23  |  2.84<H>    Ca    7.4<L>      12 Jul 2018 05:17  Phos  4.3     07-12  Mg     2.0     07-12    TPro  x   /  Alb  2.0<L>  /  TBili  x   /  DBili  x   /  AST  x   /  ALT  x   /  AlkPhos  x   07-12                          8.8    20.36 )-----------( 265      ( 10 Jul 2018 05:47 )             28.0   07-11    142  |  108  |  52<H>  ----------------------------<  80  4.0   |  23  |  3.82<H>    Ca    7.0<L>      11 Jul 2018 06:20  Phos  4.9     07-11  Mg     1.9     07-11                              8.1    20.78 )-----------( 239      ( 09 Jul 2018 05:48 )             25.6     07-09    144  |  114<H>  |  65<H>  ----------------------------<  113<H>  4.5   |  17<L>  |  3.90<H>    Ca    7.1<L>      09 Jul 2018 05:48  Mg     1.9     07-09      CARDIAC MARKERS ( 07 Jul 2018 17:51 )  0.024 ng/mL / x     / x     / x     / x                  ABG - ( 08 Jul 2018 15:47 )  pH, Arterial: 7.18  pH, Blood: x     /  pCO2: 39    /  pO2: 68    / HCO3: 14    / Base Excess: -13.1 /  SaO2: 92            < from: Xray Chest 1 View-PORTABLE IMMEDIATE (07.10.18 @ 15:50) >  INTERPRETATION:  CLINICAL INDICATION:  R  I J dialysis line placement    TECHNIQUE: Single view AP chest radiograph was performed.    COMPARISON:  Chest radiograph performed earlier on the same day,   7/10/2018 at 8:28 AM and chest radiograph dated 7/6/2018.    FINDINGS:    Interval placement of right-sided jugular central venous catheter with   tip projecting over the right atrium. No evidence forpneumothorax.    There is persistent mild pulmonary vascular congestion. There is trace   fluid within the right minor fissure.    The cardiac silhouette size is stable. Diffuse aortic calcifications are   noted    Redemonstration of anchor screw within the right humeral head.     IMPRESSION:    Interval placement of right-sided IJ CVC, with tip projecting over the   right atrium. No pneumothorax.     < end of copied text >          RADIOLOGY & ADDITIONAL STUDIES:  IMPRESSION:     Severe pancolitis consistent with pseudomembranous colitis.    Mild pulmonary edema.    Small loculated right and trace layering left pleural effusions.    < from: Xray Chest 1 View- PORTABLE-Routine (07.20.18 @ 15:52) >    PROCEDURE DATE:  07/20/2018          INTERPRETATION:  History: Acute on chronic kidney disease on   hemodialysis. Oxygen desaturation.    Single view of the chest was performed.    Comparison is made to July 16, 2018.    Findings:    There is a right-sided hemodialysis catheter in place tip at cavoatrial   junction. The lungs are clear. There is moderate improvement of   previously noted pulmonary vascular congestion. Heart size within normal   limits. Patient is status post right rotator cuff repair.    Impression:    Improvement of previously noted pulmonary vascular congestion.    < end of copied text >  LUNG BASES: Small bilateral pleural effusions. Loculated fluid versus   mucoid impaction at the right lung base.    IMPRESSION:     Stable appearance of pancolitis consistent with pseudomembranous colitis.   No pneumatosis or free air.    Increasing moderate ascites.    Anasarca.    Urinary bladder cystitis. Correlate with UA.    < from: Xray Chest 1 View- PORTABLE-Routine (07.20.18 @ 15:52) >  PROCEDURE DATE:  07/20/2018          INTERPRETATION:  History: Acute on chronic kidney disease on   hemodialysis. Oxygen desaturation.    Single view of the chest was performed.    Comparison is made to July 16, 2018.    Findings:    There is a right-sided hemodialysis catheter in place tip at cavoatrial   junction. The lungs are clear. There is moderate improvement of   previously noted pulmonary vascular congestion. Heart size within normal   limits. Patient is status post right rotator cuff repair.    Impression:    Improvement of previously noted pulmonary vascular congestion.    < end of copied text >  cxr personally reveiwed  < from: Xray Chest 1 View- PORTABLE-Routine (08.01.18 @ 10:09) >  PROCEDURE DATE:  08/01/2018          INTERPRETATION:  Exam Date: 8/1/2018 10:09 AM    Chest radiograph (one view)         CLINICAL INFORMATION:  CHF follow-up    TECHNIQUE:  Single frontal view of the chest was obtained.    COMPARISON: July 20, 2018    FINDINGS/  IMPRESSION:          Pulmonary vascular congestion and small left pleural effusion is present.   Cardiomediastinal silhouette is intact.    < end of copied text >

## 2018-08-03 NOTE — PROGRESS NOTE ADULT - ASSESSMENT
84 y/o wm with hx of ckd stage 3 ( scr 1.5-1.7) with ARYA due to volume depletion from CDiff associated colitis and diarrhea in presence of diuretic use PTA.  Now with non anion gap metabolic acidosis and worsening renal parameters.  CXR with mild CHF with hx of diastolic CHF.    PLAN  - obtain abd and lactate  - will change ivf and add bicarbonate and keep at current rate  - hold lasix done.   - fu uop and scr closely and will monitor respiratory status  - bp and hr stable now, cardizem adjusted and lopressor added    7/9 MK  - ARYA: worsening renal parameters with metabolic acidosis, non ag.  Previous lactate WNL and since placed on bicarbonate drip  fu repeat abg today.  Increase IVF rate  possibility of HD discussed with enio, hcp daughter, pt has been agreeable in past.   DC hydralazine  dc morphine and change to dilaudid  - Cdiff with rising scr and worsening abd distension: CRS consult ongoing  possible repeat ct scan  DW Dr ballesteros    7/10  Anuric  anasarca  Non anion gap acidosis on bicarb drip  ARYA, anuric  CKD 3 history  d/w Family, and pt agreeable  called ICU for line placement and to dialyze the pt in ICU for monitoring    7/10  HD initiated via R temp HD catheter  tolerating well  no complaints  good abf    7/11 SY  --ARYA with Anasarca.  Urine output slightly improved.  However, remains quite fluid overloaded.  Will plan to continue HD until fluid status is much improved.  HD order written.  3 hour tx today.  Will aim to remove 2.5 kg as tolerated.  --C DIff colitis ; continue to monitor closely.    7/12 SY  --ARYA with Anasarca.   Remains Oligo-anuric.    --HD with goal of 2.5 kg UF again today.  --C Diff colitis.   Clinically improved   Continue to monitor.    7/13  The patient was dialyzed 3 days in a row this week  Discussed with the patient's daughter and hospitalist, intensivist  Will skip dialysis today, no urgent need for dialysis  We'll dialyze the patient tomorrow on Saturday and then skip Sunday to dialyze the patient again on Monday.    7/14  We'll dialyze against 4 K bath  Case discussed with the patient's daughter  May need a permanent catheter by Monday      7/15  Dialyze with a 4K bath  Potassium is 3.3 most likely from the diarrhea  Schedule for permacath placement after dialysis on Monday or Tuesday  Patient is comfortable no complications noted at this time    7/16 MK  - ARYA/ CKD stage 3 remains oliguirc and HD dependent.  LImited UF at HD toleated with the   hypokalemia corrected with HD.  Permcath planned today  - Cdiff: on PO vanc.  improved leukocytosis and abd distension     7/17  s/p perm cath  HD tomorrow  4 K bath  replace K   overall stable    7/18 SY  --ARYA/CKD for now remaining dialysis dependent.  Continue TIW HD.  --C Diff colitis ; improving clinically.    7/19 SY  --ARYA/CKD : Continue TIW HD.   Will continue as outpatient for now as no signs of recovery at this point.  --C Diff colitis  : clinically improving.  Continue po abtx.  --D/c Planning  D/w pt's daughter re : rehab with HD.  --Replete K.  Continue regular diet without restrictions for now.    7/20 MK  - ARYA/CKD remains HD dependent with oliguria.  Will attempt UF with HD.  K correction with HD and changed to regular diet with fluid restriction  - Cdiff: PO vanc with improving renal paramenters-  - Episodes of Desat: will have pulmonary re-eval prior to dc.  - DC planning rehab with dialysis  dw Dr Andrade  will see pt    7/21 MK  - AYRA/CKD remains hd dependent. post hd cxr with improved PVC.  Still with episodes of desaturation and dr andrade input noted.  CT with evidence of ascites for paracentesis  - cdiff: on po vanc.  still with loose stools,     7/22 MK  - ARYA/CKD remains HD dependent.  Will coordinate AM HD based upon timing of paracentesis, hypokalemia improved  - hypoxia with ascites: for paracentesis   - cdif on po vanc, rectal tube in place    7/23 MK  - ARYA/CKD remains HD dependent, toelrating hd.  + inc uop   - hypoxia with ascites: s/p paracentesis today, monitor response to oxygenation  - AOCD; fu trend of h/h, on epogen  - cdiff: po vanc    7/24 SY  --ARYA/CKD  Urine output may be increasing.  Follow creat trend to determine further dialysis support.  --Post Paracentesis : improve resp status.  --Anemia : continue LETICIA.  --C Diff : continue po Vanco.    7/25 SY  --ARYA/CKD  Monitor urine output.  Noted with increased creat.  Will maintain on TIW HD since fluid overloaded state is persistent.  --Monitor GI function.  --Post Paracentesis : resp status stabilized.  --Anemia :continue LETICIA.    7/26 SY  --ARYA/CKD  : Creat slowly  trending down.  Urine output not recorded.  Check creat in am to determine whether HD can be held off.  --Recurrent abdominal pain of concern.  Being assessed by primary team and GI.  --Bladder scan to assure full void.    7/27 MK  - ARYA/CKD: with + inc Uop and significant volume depletion overnight.  Bladder scan of 77 ml with stable respiratory status.  NO Hd today and reassess in the AM.      7/28 SY  --ARYA /CKD : positive increase in urine output. However, renal parameters remain elevated.  Therefore, will maintain on TIW HD schedule for now.  --Pt's fluid status has much improved.   Will HD today with no UF and allow increased fluid intake without restrictions.  --C DIff : continue to monitor and continue po abtx.    7/29 SY  --ARYA/CKD   post HD yesterday.   Continue to monitor for signs of renal recovery.  --Fluid overloaded state much improved.  --Monitor on regular diet.  --C Diff : continue po abtx.    7/30 MK  - ARYA/CKD, with stable renal parameters Unclear if with increasing UOP.  Will hold off on HD tomorrow and assess in AM  - Cloudy urine with white sputum production: fu with ua and urine cultures    7/31  minimal cloudy urine   creat up to 4.88  Abd distended \examined w Dr zendejas  Will dialyze today for fluid removal as tolerated, 4 K bath  May need paracentesis as per Dr Zendejas    8/1 MK  - ARYA/CKD will assess in am need for HD  - cdiff; on po vanc/iv flagyll    8/2  HD in am 8/3  t/c for possible HD if drops further  feels better overall    8/3  the patient is seen on dialysis.  Hemoglobin stable at 7.9  Increase erythropoietin to 10,000 units each dialysis

## 2018-08-03 NOTE — PROGRESS NOTE ADULT - ASSESSMENT
83M w/ PMH of Afib, CHF, CAD s/p stents, COPD, recent PNA on abx, upper GI bleed (duodenal) was admitted for recurrent c. diff colitis. Currently stable. No stool production overnight. 2-3 medium loose stools yesterday. CXR shows pulmonary congestion and mild plural effusion. US shows small to moderate amount of abdominal ascites, greatest in the LLQ.

## 2018-08-03 NOTE — PROGRESS NOTE ADULT - ASSESSMENT
1) Clostridium Difficile Colitis clinically improved  2) Metabolic Acidosis  3) Restrictive Ventilatory Disease  4) SHAYY  5) Bilateral Pleural Effusions  6) Renal failure on dilaysis now  7) Leukocytosis is clinically improved  8)s/p shiley placed  9) increased ascites and small pleural effusions  10)Mucoid impaction seen in RLL cont to low recent O2 levels  11)repeat abd sono with some ascites and pleural effusions seen    84 y/o M w/pmhx of Afib, CHF, CAD s/p stents, COPD, PNA, upper GI bleed (duodenal)  BIB EMS from [facility] assisted living for fever, abd pain, and diarrhea that began 3 weeks ago. Was in the hospital for ESBL and was later dx with C-diff and started on a course of PO vancomycin for 10 days for the C-diff. At the present time has diffuse abdominal pain, being treated Vancomycin and IV Metronidazole  ABG reviewed and reveals 7.18/39/68, LA normal limits  Etiology likely from sepsis   repeat ABG noted  ABG - ( 09 Jul 2018 11:50 )  pH, Arterial: 7.21  pH, Blood: x     /  pCO2: 42    /  pO2: 67    / HCO3: 16    / Base Excess: -10.6 /  SaO2: 91      repeat cxr noted  At the present time, patient states he would like to be a full code  Continue monitoring hemodynamics (daughter states baseline diastolic tends to run between 25-40mmHg)   Monitor urine output aggressively   Used BIPAP/CPAP in the past (stopped as an outpatient after patient lost weight), may worsen intraabdominal pressures but if arrhythmias are present, may consider starting again.   Appreciate nephrology/cardiology/ID/GI/hospitalist recommendations  Will continue to monitor   hx of resp failure and intubation 2012 x 3-4 days  s/p Shiley and hemodialysis    overall prognosis remains guarded      hx SHAYY and treated with BIPAP in the past / stopped using it after wt loss  I was asked by Dr Tati Tomas to re eval for reported episodes of desaturations  pt's reported hypoxemic episodes after narcotics / while sleeping or having abd distention  he has known hx SHAYY / OHS and prior BIPAP 12/8 with O2 attached at home  will need supplemental O2 at night 3 liters nc as well as exertional need for O2 supplement as outpt  currently on o2 with o2 sat 96%  resp status improved after paracentesis  increased ascites and small pleural effusions improved after paracentesis  Mucoid impaction seen in RLL cont to low recent O2 levels  waiting for bed placement / rehab  stable resp status  cxr findings noted and discussed  all reviewed with pt and dtr on 8/2/2018  I shall sign off / pls reconsult if needed

## 2018-08-03 NOTE — PROGRESS NOTE ADULT - SUBJECTIVE AND OBJECTIVE BOX
CHIEF COMPLAINT: Diarrhea * 3 weeks    SUBJECTIVE:   HPI: This is a 82 y/o M w/pmhx of Afib, CHF, CAD s/p stents, COPD, recent PNA on abx, upper GI bleed (duodenal)  BIB EMS from [facility] for fever, abd pain, and diarrhea that began 3 weeks ago. Was in the hospital for PNA tx and was later dx with C-diff and started on a course of PO vancomycin for 10 days for the C-diff. States that he has had diarrhea since before the course of abx. Pain has been increased for the past few weeks and is characterized as a cramping feeling. Daughters also note that there is increased distension. Also notes chest pain characterized as a cramping in the central chest. 83% O2 sat noted morning of admission on 7/6/18, at the [facility] living facility. Takes O2 routinely at night but not during the day.      8/3/18:  Pt was seen and evaluated. Pt was in sitting position and eating breakfast and tolerating diet. Pt had no acute complaint and feels well. Pt report abdominal discomfort secondary to ascites. Diarrhea has minimized. HD today. D/C pending d/t HD arrangement as outpatient.       REVIEW OF SYSTEMS:  CONSTITUTIONAL: No weakness, fevers or chills  EYES/ENT: No visual changes;  No vertigo or throat pain   NECK: No pain or stiffness  RESPIRATORY: No cough, wheezing, hemoptysis; No shortness of breath  CARDIOVASCULAR: No chest pain or palpitations  GASTROINTESTINAL: No abdominal or epigastric pain. No nausea, vomiting, or hematemesis; No diarrhea or constipation. No melena or hematochezia.  GENITOURINARY: No dysuria, frequency or hematuria  NEUROLOGICAL: No numbness or weakness  SKIN: No itching, burning, rashes, or lesions   All other review of systems is negative unless indicated above    Vital Signs Last 24 Hrs  T(C): 36.4 (03 Aug 2018 11:53), Max: 36.6 (03 Aug 2018 05:10)  T(F): 97.5 (03 Aug 2018 11:53), Max: 97.9 (03 Aug 2018 05:10)  HR: 81 (03 Aug 2018 13:47) (70 - 87)  BP: 154/87 (03 Aug 2018 13:47) (132/55 - 154/87)  BP(mean): --  RR: 17 (03 Aug 2018 13:47) (17 - 18)  SpO2: 100% (03 Aug 2018 11:53) (98% - 100%)    I&O's Summary    02 Aug 2018 07:01  -  03 Aug 2018 07:00  --------------------------------------------------------  IN: 50 mL / OUT: 0 mL / NET: 50 mL        CAPILLARY BLOOD GLUCOSE          PHYSICAL EXAM:  Constitutional: NAD, awake and alert, well-developed  HEENT: PERR, EOMI, Normal Hearing, MMM  Neck: Soft and supple, No LAD, No JVD  Respiratory: Breath sounds are clear bilaterally, No wheezing, rales or rhonchi  Cardiovascular: S1 and S2, regular rate and rhythm, no Murmurs, gallops or rubs  Gastrointestinal: Bowel Sounds present, soft, nontender, nondistended, no guarding, no rebound  Extremities: No peripheral edema  Vascular: 2+ peripheral pulses  Neurological: A/O x 3, no focal deficits  Musculoskeletal: 5/5 strength b/l upper and lower extremities  Skin: No rashes    MEDICATIONS:  MEDICATIONS  (STANDING):  ALBUTerol/ipratropium for Nebulization. 3 milliLiter(s) Nebulizer once  allopurinol 100 milliGRAM(s) Oral daily  aspirin  chewable 81 milliGRAM(s) Oral daily  buDESOnide   0.5 milliGRAM(s) Respule 0.5 milliGRAM(s) Inhalation two times a day  cholestyramine Powder (Sugar-Free) 4 Gram(s) Oral two times a day  diltiazem    Tablet 30 milliGRAM(s) Oral every 6 hours  doxazosin 8 milliGRAM(s) Oral at bedtime  epoetin nelly Injectable 3000 Unit(s) IV Push <User Schedule>  epoetin nelly Injectable 4000 Unit(s) IV Push <User Schedule>  finasteride 5 milliGRAM(s) Oral daily  heparin  Injectable 5000 Unit(s) SubCutaneous every 12 hours  metoclopramide 5 milliGRAM(s) Oral three times a day  pantoprazole    Tablet 40 milliGRAM(s) Oral every 12 hours  simvastatin 10 milliGRAM(s) Oral at bedtime  sodium ferric gluconate complex Injectable 125 milliGRAM(s) IV Push <User Schedule>  vancomycin    Solution 500 milliGRAM(s) Oral every 6 hours      LABS: All Labs Reviewed:                        7.9    7.06  )-----------( 232      ( 03 Aug 2018 07:24 )             26.4     08-02    140  |  108  |  34<H>  ----------------------------<  75  3.9   |  24  |  4.09<H>    Ca    7.7<L>      02 Aug 2018 06:52  Phos  3.0     08-02  Mg     2.0     08-03    TPro  x   /  Alb  2.2<L>  /  TBili  x   /  DBili  x   /  AST  x   /  ALT  x   /  AlkPhos  x   08-02          Blood Culture: 07-31 @ 06:00  Organism --  Gram Stain Blood -- Gram Stain --  Specimen Source .Urine Clean Catch (Midstream)  Culture-Blood --        RADIOLOGY/EKG:    DVT PPX:    ADVANCED DIRECTIVE:    DISPOSITION: CHIEF COMPLAINT: Diarrhea * 3 weeks    SUBJECTIVE:   HPI: This is a 82 y/o M w/pmhx of Afib, CHF, CAD s/p stents, COPD, recent PNA on abx, upper GI bleed (duodenal)  BIB EMS from [facility] for fever, abd pain, and diarrhea that began 3 weeks ago. Was in the hospital for PNA tx and was later dx with C-diff and started on a course of PO vancomycin for 10 days for the C-diff. States that he has had diarrhea since before the course of abx. Pain has been increased for the past few weeks and is characterized as a cramping feeling. Daughters also note that there is increased distension. Also notes chest pain characterized as a cramping in the central chest. 83% O2 sat noted morning of admission on 7/6/18, at the [facility] living facility. Takes O2 routinely at night but not during the day.      8/3/18:  Pt was seen and evaluated. Pt was in sitting position and eating breakfast and tolerating diet. Pt had no acute complaint and feels well. Pt report abdominal discomfort secondary to ascites. Diarrhea has minimized. HD today. D/C pending d/t HD arrangement as outpatient.       REVIEW OF SYSTEMS:  CONSTITUTIONAL: No weakness, fevers or chills  EYES/ENT: No visual changes;  No vertigo or throat pain   NECK: No pain or stiffness  RESPIRATORY: No cough, wheezing, hemoptysis; No shortness of breath  CARDIOVASCULAR: No chest pain or palpitations  GASTROINTESTINAL: No abdominal or epigastric pain. No nausea, vomiting, or hematemesis; No diarrhea or constipation. No melena or hematochezia.  GENITOURINARY: No dysuria, frequency or hematuria  NEUROLOGICAL: No numbness or weakness  SKIN: No itching, burning, rashes, or lesions   All other review of systems is negative unless indicated above    Vital Signs Last 24 Hrs  T(C): 36.4 (03 Aug 2018 11:53), Max: 36.6 (03 Aug 2018 05:10)  T(F): 97.5 (03 Aug 2018 11:53), Max: 97.9 (03 Aug 2018 05:10)  HR: 81 (03 Aug 2018 13:47) (70 - 87)  BP: 154/87 (03 Aug 2018 13:47) (132/55 - 154/87)  BP(mean): --  RR: 17 (03 Aug 2018 13:47) (17 - 18)  SpO2: 100% (03 Aug 2018 11:53) (98% - 100%)    I&O's Summary    02 Aug 2018 07:01  -  03 Aug 2018 07:00  --------------------------------------------------------  IN: 50 mL / OUT: 0 mL / NET: 50 mL        CAPILLARY BLOOD GLUCOSE          PHYSICAL EXAM:  Constitutional: NAD, awake and alert.  Respiratory: Breath sounds are clear bilaterally.  Cardiovascular: S1 and S2, regular rate and rhythm.  Gastrointestinal: Bowel Sounds present, soft, distended, RLQ tenderness. + guarding, no rebound  Extremities: No peripheral edema  Vascular: 2+ peripheral pulses  Neurological: A/O x 3.    MEDICATIONS:  MEDICATIONS  (STANDING):  ALBUTerol/ipratropium for Nebulization. 3 milliLiter(s) Nebulizer once  allopurinol 100 milliGRAM(s) Oral daily  aspirin  chewable 81 milliGRAM(s) Oral daily  buDESOnide   0.5 milliGRAM(s) Respule 0.5 milliGRAM(s) Inhalation two times a day  cholestyramine Powder (Sugar-Free) 4 Gram(s) Oral two times a day  diltiazem    Tablet 30 milliGRAM(s) Oral every 6 hours  doxazosin 8 milliGRAM(s) Oral at bedtime  epoetin nelly Injectable 3000 Unit(s) IV Push <User Schedule>  epoetin nelly Injectable 4000 Unit(s) IV Push <User Schedule>  finasteride 5 milliGRAM(s) Oral daily  heparin  Injectable 5000 Unit(s) SubCutaneous every 12 hours  metoclopramide 5 milliGRAM(s) Oral three times a day  pantoprazole    Tablet 40 milliGRAM(s) Oral every 12 hours  simvastatin 10 milliGRAM(s) Oral at bedtime  sodium ferric gluconate complex Injectable 125 milliGRAM(s) IV Push <User Schedule>  vancomycin    Solution 500 milliGRAM(s) Oral every 6 hours      LABS: All Labs Reviewed:                        7.9    7.06  )-----------( 232      ( 03 Aug 2018 07:24 )             26.4     08-02    140  |  108  |  34<H>  ----------------------------<  75  3.9   |  24  |  4.09<H>    Ca    7.7<L>      02 Aug 2018 06:52  Phos  3.0     08-02  Mg     2.0     08-03    TPro  x   /  Alb  2.2<L>  /  TBili  x   /  DBili  x   /  AST  x   /  ALT  x   /  AlkPhos  x   08-02          Blood Culture: 07-31 @ 06:00  Organism --  Gram Stain Blood -- Gram Stain --  Specimen Source .Urine Clean Catch (Midstream)  Culture-Blood --        RADIOLOGY/EKG:    DVT PPX:    ADVANCED DIRECTIVE:    DISPOSITION:

## 2018-08-03 NOTE — PROGRESS NOTE ADULT - SUBJECTIVE AND OBJECTIVE BOX
Patient is a 83y old  Male who presents with a chief complaint of Fever/Diarrhea (20 Jul 2018 13:30)      HPI:  Pt is a 82 y/o M w/pmhx of Afib, CHF, CAD s/p stents, COPD, PNA, upper GI bleed (duodenal)  BIB EMS from [facility] living for fever, abd pain, and diarrhea that began 3 weeks ago. Was in the hospital for PNA tx and was later dx with C-diff and started on a course of PO vancomycin for 10 days for the C-diff. States that he has had diarrhea since before the course of abx. Pain has been increased for the past few weeks and is characterized as a cramping feeling. Daughters also note that there is increased distension. Also notes chest pain characterized as a cramping in the central chest. 83% O2 sat noted this morning at the assisted living facility. Takes O2 routinely at night but not during the day. (06 Jul 2018 23:55)  pt with mild inc lower abd pain last night, crampy and unrelated to anything and then resolved, 20 min,  BM times 3 yest, inc in form  neg N V  vida PO    PAST MEDICAL & SURGICAL HISTORY:  Diastolic CHF  Peripheral vascular disease  Afib  Anemia  CKD (chronic kidney disease)  COPD (chronic obstructive pulmonary disease)  SHAYY (obstructive sleep apnea)  Sepsis, due to unspecified organism: 2/2 poorly healing wounds b/l  Dyspepsia: On moderate exertion.  Sleep apnea, obstructive: Requires home 02 therapy, and treatment with BIPAP  Atelectasis  Pleural effusion, bilateral  Respiratory failure  Peripheral edema  CRI (chronic renal insufficiency)  Gout  Benign prostatic hypertrophy  Spinal stenosis  Hypercholesterolemia  GERD (gastroesophageal reflux disease)  CAD (coronary artery disease)  Hypertension  S/P angioplasty with stent  Cataract of left eye  Prostate: Surgery green light procedure.  S/P rotator cuff surgery: Right  S/P angioplasty      MEDICATIONS  (STANDING):  ALBUTerol/ipratropium for Nebulization. 3 milliLiter(s) Nebulizer once  allopurinol 100 milliGRAM(s) Oral daily  aspirin  chewable 81 milliGRAM(s) Oral daily  buDESOnide   0.5 milliGRAM(s) Respule 0.5 milliGRAM(s) Inhalation two times a day  cholestyramine Powder (Sugar-Free) 4 Gram(s) Oral two times a day  diltiazem    Tablet 30 milliGRAM(s) Oral every 6 hours  doxazosin 8 milliGRAM(s) Oral at bedtime  epoetin nelly Injectable 3000 Unit(s) IV Push <User Schedule>  epoetin nelly Injectable 4000 Unit(s) IV Push <User Schedule>  finasteride 5 milliGRAM(s) Oral daily  heparin  Injectable 5000 Unit(s) SubCutaneous every 12 hours  metoclopramide 5 milliGRAM(s) Oral three times a day  pantoprazole    Tablet 40 milliGRAM(s) Oral every 12 hours  simvastatin 10 milliGRAM(s) Oral at bedtime  sodium ferric gluconate complex Injectable 125 milliGRAM(s) IV Push <User Schedule>  vancomycin    Solution 500 milliGRAM(s) Oral every 6 hours    MEDICATIONS  (PRN):  acetaminophen   Tablet 650 milliGRAM(s) Oral every 8 hours PRN For Temp greater than 38 C (100.4 F)  acetaminophen   Tablet. 650 milliGRAM(s) Oral every 8 hours PRN Mild Pain (1 - 3)  albumin human 25% IVPB 50 milliLiter(s) IV Intermittent <User Schedule> PRN sbp<=120mmhg  ALBUTerol   0.5% 2.5 milliGRAM(s) Nebulizer every 4 hours PRN Shortness of Breath and/or Wheezing  diphenhydrAMINE   Capsule 25 milliGRAM(s) Oral at bedtime PRN sleep  fluticasone propionate 50 MICROgram(s)/spray Nasal Spray 1 Spray(s) Both Nostrils two times a day PRN runny nose  ondansetron Injectable 4 milliGRAM(s) IV Push every 6 hours PRN Nausea and/or Vomiting      Allergies    Zosyn (Rash)    Intolerances        SOCIAL HISTORY:NC    FAMILY HISTORY:  No pertinent family history in first degree relatives      REVIEW OF SYSTEMS:    CONSTITUTIONAL: No weakness, fevers or chills  EYES/ENT: No visual changes;  No vertigo or throat pain   NECK: No pain or stiffness  RESPIRATORY: No cough, wheezing, hemoptysis; No shortness of breath  CARDIOVASCULAR: No chest pain or palpitations  GENITOURINARY: No dysuria, frequency or hematuria  NEUROLOGICAL: No numbness or weakness  SKIN: No itching, burning, rashes, or lesions   All other review of systems is negative unless indicated above.    Vital Signs Last 24 Hrs  T(C): 36.6 (03 Aug 2018 05:10), Max: 36.6 (03 Aug 2018 05:10)  T(F): 97.9 (03 Aug 2018 05:10), Max: 97.9 (03 Aug 2018 05:10)  HR: 70 (03 Aug 2018 05:10) (70 - 85)  BP: 145/32 (03 Aug 2018 05:10) (132/55 - 145/32)  BP(mean): --  RR: 18 (03 Aug 2018 05:10) (18 - 18)  SpO2: 98% (03 Aug 2018 05:10) (98% - 98%)    PHYSICAL EXAM:    Constitutional: NAD, well-developed  HEENT: EOMI, throat clear  Neck: No LAD, supple  Respiratory: CTA and P  Cardiovascular: S1 and S2, RRR, no M  Gastrointestinal: BS+, soft, NT/mild distension and shifting dullness, neg HSM,  Extremities: pos peripheral edema, neg clubing, cyanosis    Neurological: A/O x 3, no focal deficits  Psychiatric: Normal mood, normal affect  Skin: No rashes    LABS:  CBC Full  -  ( 03 Aug 2018 07:24 )  WBC Count : 7.06 K/uL  Hemoglobin : 7.9 g/dL  Hematocrit : 26.4 %  Platelet Count - Automated : 232 K/uL  Mean Cell Volume : 99.2 fl  Mean Cell Hemoglobin : 29.7 pg  Mean Cell Hemoglobin Concentration : 29.9 gm/dL  Auto Neutrophil # : x  Auto Lymphocyte # : x  Auto Monocyte # : x  Auto Eosinophil # : x  Auto Basophil # : x  Auto Neutrophil % : x  Auto Lymphocyte % : x  Auto Monocyte % : x  Auto Eosinophil % : x  Auto Basophil % : x    08-02    140  |  108  |  34<H>  ----------------------------<  75  3.9   |  24  |  4.09<H>    Ca    7.7<L>      02 Aug 2018 06:52  Phos  3.0     08-02  Mg     2.0     08-03    TPro  x   /  Alb  2.2<L>  /  TBili  x   /  DBili  x   /  AST  x   /  ALT  x   /  AlkPhos  x   08-02            RADIOLOGY & ADDITIONAL STUDIES:  < from: US Abdomen Limited (08.02.18 @ 15:25) >  EXAM:  US ABDOMEN LIMITED                            PROCEDURE DATE:  08/02/2018          INTERPRETATION:  Exam Date: August 2, 2018    Ultrasound of the 4 abdominal quadrants    CLINICAL INFORMATION:   Distended abdomen, evaluate for ascites.      TECHNIQUE:   Transcutaneous ultrasonography limited to the 4 quadrants of   the abdomen was performed for evaluation of ascites.    COMPARISON: Abdominal/pelvic CT dated 7/21/2018.    FINDINGS:       Within the right upper quadrant there is evidence for a right pleural   effusion.    There is trace free fluid about the liver.    Within the right lower, left upper, and left lower quadrants of the   abdomen there is small to moderate amount of abdominal ascites, with   greatest amount within the left lower quadrant.    IMPRESSION:       Small to moderate amount of abdominal ascites, greatest in the left lower   quadrant.    Right pleural effusion.    < end of copied text >

## 2018-08-03 NOTE — PROGRESS NOTE ADULT - PROBLEM SELECTOR PLAN 2
- continue to maintain fluid balance with HD MWF   - control HFpEF with medical management   - O2 as needed  - As per GI, no paracentesis needed for ascites since there is no respiratory distress.  - Pulm appreciated, add nebs  - Incentive Spirometry  - Moderate Ascites - contributing to desaturation; s/p Paracentesis 7/23/18 w/ 2.8 L fluid removed, improved respiratory status   - 4L NC O2 at night and on exertion as needed  - Hourizadeh appreciated, no longer needs BIPAP.

## 2018-08-03 NOTE — PROGRESS NOTE ADULT - ASSESSMENT
c diff colitis  By mouth vancomycin, high dose   case discussed with  family  Patient with continued abdominal pain but improving and possible inc diarrhea, improving slowly      Patient had a recent course of C. difficile that was treated with vancomycin with a recurrence and he's had C. difficile in the past.  Due to severe C. difficile, would strongly consider a prolonged taper of vancomycin.  For now, would complete two-week course of vancomycin and then would taper her vancomycin over a long time.  Discussed with family.  Taper vancomycin  Vancomycin 125 mg 4 times a day, one more week  Then 3 times a day for 2 weeks, then twice a day for one week, then daily for one week, then every other day for 1 week, then every 3rd day for 1 week.  Follow-up with me after that  Vanco taper eventually. However At this time, would continue high-dose vancomycin with Flagyl. Is improving slowly on it.  Encourage by mouth intake, encourage physical therapy    cough/sneeze, clinical FU and hospitalist management          A fibrillation    COPD obesity, obstructive sleep apnea, coronary artery disease, overall comorbid risk factors     IVF per renal  HD as needed      acites, SP paracentesis c diff colitis  By mouth vancomycin, high dose   case discussed with  family  Patient with continued abdominal pain but improving and possible inc diarrhea, improving slowly      Patient had a recent course of C. difficile that was treated with vancomycin with a recurrence and he's had C. difficile in the past.  Due to severe C. difficile, would strongly consider a prolonged taper of vancomycin.  For now, would complete two-week course of vancomycin 500 qid  and then would taper her vancomycin over a long time.  Discussed with family.  Taper vancomycin  Vancomycin 500 mg 4 times a day, total of 2 weeks  Then 3 times a day for 2 weeks, then twice a day for one week, then daily for one week, then every other day for 1 week, then every 3rd day for 1 week.  Follow-up with me after that  Vanco taper eventually. However At this time, would continue high-dose vancomycin with Flagyl. Is improving slowly on it.  Encourage by mouth intake, encourage physical therapy    cough/sneeze, clinical FU and hospitalist management          A fibrillation    COPD obesity, obstructive sleep apnea, coronary artery disease, overall comorbid risk factors     IVF per renal  HD as needed      acites, SP paracentesis      DW ID

## 2018-08-03 NOTE — PROGRESS NOTE ADULT - SUBJECTIVE AND OBJECTIVE BOX
NEPHROLOGY INTERVAL HPI/OVERNIGHT EVENTS    8/3  Seen on dialysis  We'll place on erythropoietin 10,000 units each dialysis  Hemoglobin 7.9      8/2  OOB  feels well  poor appetite  ne new events  minimal cloudy urine     8/1  no new c/o doing well  still with uop of 75-100ml   diarrhea 1x per day  appetite remains poor    MEDICATIONS  (STANDING):  ALBUTerol/ipratropium for Nebulization. 3 milliLiter(s) Nebulizer once  allopurinol 100 milliGRAM(s) Oral daily  aspirin  chewable 81 milliGRAM(s) Oral daily  buDESOnide   0.5 milliGRAM(s) Respule 0.5 milliGRAM(s) Inhalation two times a day  cholestyramine Powder (Sugar-Free) 4 Gram(s) Oral two times a day  diltiazem    Tablet 30 milliGRAM(s) Oral every 6 hours  doxazosin 8 milliGRAM(s) Oral at bedtime  epoetin nelly Injectable 3000 Unit(s) IV Push <User Schedule>  epoetin nelly Injectable 4000 Unit(s) IV Push <User Schedule>  finasteride 5 milliGRAM(s) Oral daily  heparin  Injectable 5000 Unit(s) SubCutaneous every 12 hours  metoclopramide 5 milliGRAM(s) Oral three times a day  metroNIDAZOLE  IVPB 250 milliGRAM(s) IV Intermittent every 8 hours  pantoprazole    Tablet 40 milliGRAM(s) Oral every 12 hours  simvastatin 10 milliGRAM(s) Oral at bedtime  sodium ferric gluconate complex Injectable 125 milliGRAM(s) IV Push <User Schedule>  vancomycin    Solution 500 milliGRAM(s) Oral every 6 hours    MEDICATIONS  (PRN):  acetaminophen   Tablet 650 milliGRAM(s) Oral every 8 hours PRN For Temp greater than 38 C (100.4 F)  acetaminophen   Tablet. 650 milliGRAM(s) Oral every 8 hours PRN Mild Pain (1 - 3)  albumin human 25% IVPB 50 milliLiter(s) IV Intermittent <User Schedule> PRN sbp<=120mmhg  ALBUTerol   0.5% 2.5 milliGRAM(s) Nebulizer every 4 hours PRN Shortness of Breath and/or Wheezing  diphenhydrAMINE   Capsule 25 milliGRAM(s) Oral at bedtime PRN sleep  fluticasone propionate 50 MICROgram(s)/spray Nasal Spray 1 Spray(s) Both Nostrils two times a day PRN runny nose  HYDROmorphone  Injectable 0.5 milliGRAM(s) IV Push every 6 hours PRN Severe Pain (7 - 10)  ondansetron Injectable 4 milliGRAM(s) IV Push every 6 hours PRN Nausea and/or Vomiting      Allergies    Zosyn (Rash)    Intolerances        I&O's Detail    31 Jul 2018 07:01  -  01 Aug 2018 07:00  --------------------------------------------------------  IN:    IV PiggyBack: 100 mL    Oral Fluid: 420 mL  Total IN: 520 mL    OUT:  Total OUT: 0 mL    Total NET: 520 mL          Vital Signs Last 24 Hrs  T(C): 36.5 (02 Aug 2018 10:30), Max: 37 (02 Aug 2018 05:16)  T(F): 97.7 (02 Aug 2018 10:30), Max: 98.6 (02 Aug 2018 05:16)  HR: 74 (02 Aug 2018 10:30) (74 - 85)  BP: 138/43 (02 Aug 2018 10:30) (117/29 - 144/66)  BP(mean): --  RR: 18 (02 Aug 2018 10:30) (18 - 18)  SpO2: 98% (02 Aug 2018 10:30) (94% - 99%)      PHYSICAL EXAM:  General: alert. awake Ox3  HEENT: MMM  CV: s1s2 rrr  LUNGS: B/L CTA  EXT: no edema    LABS:                          7.9    7.06  )-----------( 232      ( 03 Aug 2018 07:24 )             26.4       08-03    143  |  110<H>  |  39<H>  ----------------------------<  116<H>  3.7   |  23  |  3.81<H>    Ca    7.8<L>      03 Aug 2018 13:55  Phos  2.7     08-03  Mg     2.0     08-03    TPro  x   /  Alb  2.6<L>  /  TBili  x   /  DBili  x   /  AST  x   /  ALT  x   /  AlkPhos  x   08-03

## 2018-08-04 LAB
HCT VFR BLD CALC: 24.9 % — LOW (ref 39–50)
HGB BLD-MCNC: 7.6 G/DL — LOW (ref 13–17)
MAGNESIUM SERPL-MCNC: 1.9 MG/DL — SIGNIFICANT CHANGE UP (ref 1.6–2.6)
MCHC RBC-ENTMCNC: 30.2 PG — SIGNIFICANT CHANGE UP (ref 27–34)
MCHC RBC-ENTMCNC: 30.5 GM/DL — LOW (ref 32–36)
MCV RBC AUTO: 98.8 FL — SIGNIFICANT CHANGE UP (ref 80–100)
NRBC # BLD: 0 /100 WBCS — SIGNIFICANT CHANGE UP (ref 0–0)
PLATELET # BLD AUTO: 193 K/UL — SIGNIFICANT CHANGE UP (ref 150–400)
RBC # BLD: 2.52 M/UL — LOW (ref 4.2–5.8)
RBC # FLD: 18.9 % — HIGH (ref 10.3–14.5)
WBC # BLD: 5.52 K/UL — SIGNIFICANT CHANGE UP (ref 3.8–10.5)
WBC # FLD AUTO: 5.52 K/UL — SIGNIFICANT CHANGE UP (ref 3.8–10.5)

## 2018-08-04 RX ORDER — NYSTATIN CREAM 100000 [USP'U]/G
1 CREAM TOPICAL
Qty: 0 | Refills: 0 | Status: DISCONTINUED | OUTPATIENT
Start: 2018-08-04 | End: 2018-08-09

## 2018-08-04 RX ORDER — ERYTHROPOIETIN 10000 [IU]/ML
10000 INJECTION, SOLUTION INTRAVENOUS; SUBCUTANEOUS
Qty: 0 | Refills: 0 | Status: DISCONTINUED | OUTPATIENT
Start: 2018-08-04 | End: 2018-08-09

## 2018-08-04 RX ADMIN — Medication 500 MILLIGRAM(S): at 17:57

## 2018-08-04 RX ADMIN — PANTOPRAZOLE SODIUM 40 MILLIGRAM(S): 20 TABLET, DELAYED RELEASE ORAL at 17:58

## 2018-08-04 RX ADMIN — Medication 5 MILLIGRAM(S): at 06:14

## 2018-08-04 RX ADMIN — CHOLESTYRAMINE 4 GRAM(S): 4 POWDER, FOR SUSPENSION ORAL at 22:54

## 2018-08-04 RX ADMIN — Medication 8 MILLIGRAM(S): at 22:55

## 2018-08-04 RX ADMIN — PANTOPRAZOLE SODIUM 40 MILLIGRAM(S): 20 TABLET, DELAYED RELEASE ORAL at 06:14

## 2018-08-04 RX ADMIN — HEPARIN SODIUM 5000 UNIT(S): 5000 INJECTION INTRAVENOUS; SUBCUTANEOUS at 17:58

## 2018-08-04 RX ADMIN — Medication 81 MILLIGRAM(S): at 09:30

## 2018-08-04 RX ADMIN — Medication 0.5 MILLIGRAM(S): at 20:11

## 2018-08-04 RX ADMIN — NYSTATIN CREAM 1 APPLICATION(S): 100000 CREAM TOPICAL at 12:48

## 2018-08-04 RX ADMIN — Medication 5 MILLIGRAM(S): at 14:41

## 2018-08-04 RX ADMIN — Medication 500 MILLIGRAM(S): at 12:49

## 2018-08-04 RX ADMIN — Medication 500 MILLIGRAM(S): at 06:14

## 2018-08-04 RX ADMIN — HEPARIN SODIUM 5000 UNIT(S): 5000 INJECTION INTRAVENOUS; SUBCUTANEOUS at 06:13

## 2018-08-04 RX ADMIN — Medication 500 MILLIGRAM(S): at 22:55

## 2018-08-04 RX ADMIN — Medication 0.5 MILLIGRAM(S): at 08:45

## 2018-08-04 RX ADMIN — Medication 500 MILLIGRAM(S): at 00:06

## 2018-08-04 RX ADMIN — SIMVASTATIN 10 MILLIGRAM(S): 20 TABLET, FILM COATED ORAL at 22:55

## 2018-08-04 RX ADMIN — Medication 5 MILLIGRAM(S): at 22:55

## 2018-08-04 RX ADMIN — CHOLESTYRAMINE 4 GRAM(S): 4 POWDER, FOR SUSPENSION ORAL at 09:29

## 2018-08-04 RX ADMIN — Medication 100 MILLIGRAM(S): at 09:29

## 2018-08-04 RX ADMIN — FINASTERIDE 5 MILLIGRAM(S): 5 TABLET, FILM COATED ORAL at 09:30

## 2018-08-04 NOTE — PROGRESS NOTE ADULT - SUBJECTIVE AND OBJECTIVE BOX
Pt has been seen and examined with FP resident, resident supervised agree with a/p       Patient is a 83y old  Male who presents with a chief complaint of Fever/Diarrhea (20 Jul 2018 13:30)        PHYSICAL EXAM:    general- comfortable, chronically ill appearance   -[id number]-[id number],cta  -cvs-s1s2 normal   -p/a-big, soft, tender on deep palpation bs+, ? ascitis present   -extremity- no asymmetrical swelling noted         A/P    # ct same rx     # awaiting HD chair, probably discharge on monday

## 2018-08-04 NOTE — PROGRESS NOTE ADULT - ASSESSMENT
82 y/o wm with hx of ckd stage 3 ( scr 1.5-1.7) with ARYA due to volume depletion from CDiff associated colitis and diarrhea in presence of diuretic use PTA.  Now with non anion gap metabolic acidosis and worsening renal parameters.  CXR with mild CHF with hx of diastolic CHF.    PLAN  - obtain abd and lactate  - will change ivf and add bicarbonate and keep at current rate  - hold lasix done.   - fu uop and scr closely and will monitor respiratory status  - bp and hr stable now, cardizem adjusted and lopressor added    7/9 MK  - ARYA: worsening renal parameters with metabolic acidosis, non ag.  Previous lactate WNL and since placed on bicarbonate drip  fu repeat abg today.  Increase IVF rate  possibility of HD discussed with enio, hcp daughter, pt has been agreeable in past.   DC hydralazine  dc morphine and change to dilaudid  - Cdiff with rising scr and worsening abd distension: CRS consult ongoing  possible repeat ct scan  DW Dr ballesteros    7/10  Anuric  anasarca  Non anion gap acidosis on bicarb drip  ARYA, anuric  CKD 3 history  d/w Family, and pt agreeable  called ICU for line placement and to dialyze the pt in ICU for monitoring    7/10  HD initiated via R temp HD catheter  tolerating well  no complaints  good abf    7/11 SY  --ARYA with Anasarca.  Urine output slightly improved.  However, remains quite fluid overloaded.  Will plan to continue HD until fluid status is much improved.  HD order written.  3 hour tx today.  Will aim to remove 2.5 kg as tolerated.  --C DIff colitis ; continue to monitor closely.    7/12 SY  --ARYA with Anasarca.   Remains Oligo-anuric.    --HD with goal of 2.5 kg UF again today.  --C Diff colitis.   Clinically improved   Continue to monitor.    7/13  The patient was dialyzed 3 days in a row this week  Discussed with the patient's daughter and hospitalist, intensivist  Will skip dialysis today, no urgent need for dialysis  We'll dialyze the patient tomorrow on Saturday and then skip Sunday to dialyze the patient again on Monday.    7/14  We'll dialyze against 4 K bath  Case discussed with the patient's daughter  May need a permanent catheter by Monday      7/15  Dialyze with a 4K bath  Potassium is 3.3 most likely from the diarrhea  Schedule for permacath placement after dialysis on Monday or Tuesday  Patient is comfortable no complications noted at this time    7/16 MK  - ARYA/ CKD stage 3 remains oliguirc and HD dependent.  LImited UF at HD toleated with the   hypokalemia corrected with HD.  Permcath planned today  - Cdiff: on PO vanc.  improved leukocytosis and abd distension     7/17  s/p perm cath  HD tomorrow  4 K bath  replace K   overall stable    7/18 SY  --ARYA/CKD for now remaining dialysis dependent.  Continue TIW HD.  --C Diff colitis ; improving clinically.    7/19 SY  --ARYA/CKD : Continue TIW HD.   Will continue as outpatient for now as no signs of recovery at this point.  --C Diff colitis  : clinically improving.  Continue po abtx.  --D/c Planning  D/w pt's daughter re : rehab with HD.  --Replete K.  Continue regular diet without restrictions for now.    7/20 MK  - ARYA/CKD remains HD dependent with oliguria.  Will attempt UF with HD.  K correction with HD and changed to regular diet with fluid restriction  - Cdiff: PO vanc with improving renal paramenters-  - Episodes of Desat: will have pulmonary re-eval prior to dc.  - DC planning rehab with dialysis  dw Dr Andrade  will see pt    7/21 MK  - ARYA/CKD remains hd dependent. post hd cxr with improved PVC.  Still with episodes of desaturation and dr andrade input noted.  CT with evidence of ascites for paracentesis  - cdiff: on po vanc.  still with loose stools,     7/22 MK  - ARYA/CKD remains HD dependent.  Will coordinate AM HD based upon timing of paracentesis, hypokalemia improved  - hypoxia with ascites: for paracentesis   - cdif on po vanc, rectal tube in place    7/23 MK  - ARYA/CKD remains HD dependent, toelrating hd.  + inc uop   - hypoxia with ascites: s/p paracentesis today, monitor response to oxygenation  - AOCD; fu trend of h/h, on epogen  - cdiff: po vanc    7/24 SY  --ARYA/CKD  Urine output may be increasing.  Follow creat trend to determine further dialysis support.  --Post Paracentesis : improve resp status.  --Anemia : continue LETICIA.  --C Diff : continue po Vanco.    7/25 SY  --ARYA/CKD  Monitor urine output.  Noted with increased creat.  Will maintain on TIW HD since fluid overloaded state is persistent.  --Monitor GI function.  --Post Paracentesis : resp status stabilized.  --Anemia :continue LETICIA.    7/26 SY  --ARYA/CKD  : Creat slowly  trending down.  Urine output not recorded.  Check creat in am to determine whether HD can be held off.  --Recurrent abdominal pain of concern.  Being assessed by primary team and GI.  --Bladder scan to assure full void.    7/27 MK  - ARYA/CKD: with + inc Uop and significant volume depletion overnight.  Bladder scan of 77 ml with stable respiratory status.  NO Hd today and reassess in the AM.      7/28 SY  --ARYA /CKD : positive increase in urine output. However, renal parameters remain elevated.  Therefore, will maintain on TIW HD schedule for now.  --Pt's fluid status has much improved.   Will HD today with no UF and allow increased fluid intake without restrictions.  --C DIff : continue to monitor and continue po abtx.    7/29 SY  --ARYA/CKD   post HD yesterday.   Continue to monitor for signs of renal recovery.  --Fluid overloaded state much improved.  --Monitor on regular diet.  --C Diff : continue po abtx.    7/30 MK  - ARYA/CKD, with stable renal parameters Unclear if with increasing UOP.  Will hold off on HD tomorrow and assess in AM  - Cloudy urine with white sputum production: fu with ua and urine cultures    7/31  minimal cloudy urine   creat up to 4.88  Abd distended \examined w Dr zendejas  Will dialyze today for fluid removal as tolerated, 4 K bath  May need paracentesis as per Dr Zendejas    8/1 MK  - ARYA/CKD will assess in am need for HD  - cdiff; on po vanc/iv flagyll    8/2  HD in am 8/3  t/c for possible HD if drops further  feels better overall    8/3  the patient is seen on dialysis.  Hemoglobin stable at 7.9  Increase erythropoietin to 10,000 units each dialysis    8/4  Tolerating regular diet  Out of bed  Feels well  Status post hemodialysis yesterday  Next dialysis is scheduled for Monday  From a renal standpoint may be discharged early next week.  We'll need to arrange outpatient dialysis

## 2018-08-04 NOTE — PROGRESS NOTE ADULT - SUBJECTIVE AND OBJECTIVE BOX
CHIEF COMPLAINT: diarrhea * 3weeks    SUBJECTIVE:   HPI: This is a 84 y/o M w/pmhx of Afib, CHF, CAD s/p stents, COPD, recent PNA on abx, upper GI bleed (duodenal)  BIB EMS from [facility] for fever, abd pain, and diarrhea that began 3 weeks ago. Was in the hospital for PNA tx and was later dx with C-diff and started on a course of PO vancomycin for 10 days for the C-diff. States that he has had diarrhea since before the course of abx. Pain has been increased for the past few weeks and is characterized as a cramping feeling. Daughters also note that there is increased distension. Also notes chest pain characterized as a cramping in the central chest. 83% O2 sat noted morning of admission on 7/6/18, at the [facility] living facility. Takes O2 routinely at night but not during the day.      8/4/18:  Pt was seen and evaluated. Pt feels better. Still has lower abdominal tenderness. Abdomen still distended and unchanged from yesterday. Pt reports increased production of phlegm Phlegm are clear and no blood streaks.  No stools overnight. Nurse reports 3 loose stools today. No n/v/fever/chills.     REVIEW OF SYSTEMS:  CONSTITUTIONAL: No weakness, fevers or chills  EYES/ENT: No visual changes;  No vertigo or throat pain   NECK: No pain or stiffness  RESPIRATORY: No cough, wheezing, hemoptysis; No shortness of breath  CARDIOVASCULAR: No chest pain or palpitations  GASTROINTESTINAL: No abdominal or epigastric pain. No nausea, vomiting, or hematemesis; No diarrhea or constipation. No melena or hematochezia.  GENITOURINARY: No dysuria, frequency or hematuria  NEUROLOGICAL: No numbness or weakness  SKIN: No itching, burning, rashes, or lesions   All other review of systems is negative unless indicated above    Vital Signs Last 24 Hrs  T(C): 36.9 (04 Aug 2018 12:57), Max: 36.9 (04 Aug 2018 12:57)  T(F): 98.4 (04 Aug 2018 12:57), Max: 98.4 (04 Aug 2018 12:57)  HR: 77 (04 Aug 2018 12:57) (58 - 130)  BP: 128/35 (04 Aug 2018 12:57) (113/47 - 153/56)  BP(mean): --  RR: 18 (04 Aug 2018 12:57) (18 - 18)  SpO2: 100% (04 Aug 2018 12:57) (95% - 100%)    I&O's Summary    04 Aug 2018 07:01  -  04 Aug 2018 15:28  --------------------------------------------------------  IN: 300 mL / OUT: 0 mL / NET: 300 mL        CAPILLARY BLOOD GLUCOSE          PHYSICAL EXAM:  Constitutional: NAD, awake and alert.  Respiratory: Breath sounds are clear bilaterally.  Cardiovascular: S1 and S2, RRR.  Gastrointestinal: Bowel Sounds present, soft, tender at RLQ, distended abdomen. +guarding, no rebound.  Extremities: No peripheral edema  Vascular: 2+ peripheral pulses  Neurological: A/O x 3.    MEDICATIONS:  MEDICATIONS  (STANDING):  ALBUTerol/ipratropium for Nebulization. 3 milliLiter(s) Nebulizer once  allopurinol 100 milliGRAM(s) Oral daily  aspirin  chewable 81 milliGRAM(s) Oral daily  buDESOnide   0.5 milliGRAM(s) Respule 0.5 milliGRAM(s) Inhalation two times a day  cholestyramine Powder (Sugar-Free) 4 Gram(s) Oral two times a day  diltiazem    Tablet 30 milliGRAM(s) Oral every 6 hours  doxazosin 8 milliGRAM(s) Oral at bedtime  epoetin nelly Injectable 63000 Unit(s) IV Push <User Schedule>  finasteride 5 milliGRAM(s) Oral daily  heparin  Injectable 5000 Unit(s) SubCutaneous every 12 hours  metoclopramide 5 milliGRAM(s) Oral three times a day  pantoprazole    Tablet 40 milliGRAM(s) Oral every 12 hours  simvastatin 10 milliGRAM(s) Oral at bedtime  sodium ferric gluconate complex Injectable 125 milliGRAM(s) IV Push <User Schedule>  vancomycin    Solution 500 milliGRAM(s) Oral every 6 hours      LABS: All Labs Reviewed:                        7.6    5.52  )-----------( 193      ( 04 Aug 2018 07:34 )             24.9     08-03    143  |  110<H>  |  39<H>  ----------------------------<  116<H>  3.7   |  23  |  3.81<H>    Ca    7.8<L>      03 Aug 2018 13:55  Phos  2.7     08-03  Mg     1.9     08-04    TPro  x   /  Alb  2.6<L>  /  TBili  x   /  DBili  x   /  AST  x   /  ALT  x   /  AlkPhos  x   08-03          Blood Culture: 07-31 @ 06:00  Organism --  Gram Stain Blood -- Gram Stain --  Specimen Source .Urine Clean Catch (Midstream)  Culture-Blood --        RADIOLOGY/EKG:    DVT PPX:    ADVANCED DIRECTIVE:    DISPOSITION:

## 2018-08-04 NOTE — PROGRESS NOTE ADULT - SUBJECTIVE AND OBJECTIVE BOX
NEPHROLOGY INTERVAL HPI/OVERNIGHT EVENTS    8/4  Dialyzed yesterday  Hemoglobin was 7.9  Abdominal ultrasound on August 2 shows mild abdominal ascites  Comfortable today eating tolerating a regular diet    8/3  Seen on dialysis  We'll place on erythropoietin 10,000 units each dialysis  Hemoglobin 7.9      8/2  OOB  feels well  poor appetite  ne new events  minimal cloudy urine     8/1  no new c/o doing well  still with uop of 75-100ml   diarrhea 1x per day  appetite remains poor    MEDICATIONS  (STANDING):  ALBUTerol/ipratropium for Nebulization. 3 milliLiter(s) Nebulizer once  allopurinol 100 milliGRAM(s) Oral daily  aspirin  chewable 81 milliGRAM(s) Oral daily  buDESOnide   0.5 milliGRAM(s) Respule 0.5 milliGRAM(s) Inhalation two times a day  cholestyramine Powder (Sugar-Free) 4 Gram(s) Oral two times a day  diltiazem    Tablet 30 milliGRAM(s) Oral every 6 hours  doxazosin 8 milliGRAM(s) Oral at bedtime  epoetin nelly Injectable 3000 Unit(s) IV Push <User Schedule>  epoetin nelly Injectable 4000 Unit(s) IV Push <User Schedule>  finasteride 5 milliGRAM(s) Oral daily  heparin  Injectable 5000 Unit(s) SubCutaneous every 12 hours  metoclopramide 5 milliGRAM(s) Oral three times a day  metroNIDAZOLE  IVPB 250 milliGRAM(s) IV Intermittent every 8 hours  pantoprazole    Tablet 40 milliGRAM(s) Oral every 12 hours  simvastatin 10 milliGRAM(s) Oral at bedtime  sodium ferric gluconate complex Injectable 125 milliGRAM(s) IV Push <User Schedule>  vancomycin    Solution 500 milliGRAM(s) Oral every 6 hours    MEDICATIONS  (PRN):  acetaminophen   Tablet 650 milliGRAM(s) Oral every 8 hours PRN For Temp greater than 38 C (100.4 F)  acetaminophen   Tablet. 650 milliGRAM(s) Oral every 8 hours PRN Mild Pain (1 - 3)  albumin human 25% IVPB 50 milliLiter(s) IV Intermittent <User Schedule> PRN sbp<=120mmhg  ALBUTerol   0.5% 2.5 milliGRAM(s) Nebulizer every 4 hours PRN Shortness of Breath and/or Wheezing  diphenhydrAMINE   Capsule 25 milliGRAM(s) Oral at bedtime PRN sleep  fluticasone propionate 50 MICROgram(s)/spray Nasal Spray 1 Spray(s) Both Nostrils two times a day PRN runny nose  HYDROmorphone  Injectable 0.5 milliGRAM(s) IV Push every 6 hours PRN Severe Pain (7 - 10)  ondansetron Injectable 4 milliGRAM(s) IV Push every 6 hours PRN Nausea and/or Vomiting      Allergies    Zosyn (Rash)    Intolerances        I&O's Detail    31 Jul 2018 07:01  -  01 Aug 2018 07:00  --------------------------------------------------------  IN:    IV PiggyBack: 100 mL    Oral Fluid: 420 mL  Total IN: 520 mL    OUT:  Total OUT: 0 mL    Total NET: 520 mL    Vital Signs Last 24 Hrs  T(C): 36.9 (04 Aug 2018 12:57), Max: 36.9 (04 Aug 2018 12:57)  T(F): 98.4 (04 Aug 2018 12:57), Max: 98.4 (04 Aug 2018 12:57)  HR: 77 (04 Aug 2018 12:57) (58 - 130)  BP: 128/35 (04 Aug 2018 12:57) (113/47 - 154/87)  BP(mean): --  RR: 18 (04 Aug 2018 12:57) (17 - 18)  SpO2: 100% (04 Aug 2018 12:57) (95% - 100%)          PHYSICAL EXAM:  Tolerating regular diet sitting out of bed  General: alert. awake Ox3  HEENT: MMM  CV: s1s2 rrr  LUNGS: B/L CTA  EXT: no edema    LABS:                          7.6    5.52  )-----------( 193      ( 04 Aug 2018 07:34 )             24.9     08-03    143  |  110<H>  |  39<H>  ----------------------------<  116<H>  3.7   |  23  |  3.81<H>    Ca    7.8<L>      03 Aug 2018 13:55  Phos  2.7     08-03  Mg     1.9     08-04    TPro  x   /  Alb  2.6<L>  /  TBili  x   /  DBili  x   /  AST  x   /  ALT  x   /  AlkPhos  x   08-03

## 2018-08-04 NOTE — PROGRESS NOTE ADULT - PROBLEM SELECTOR PLAN 1
- Vancomycin po.  - S/p flagyl  mg q8h (d/c 8/3/18 by ID)  -ID recs: limit abx exposure, cont current abx regimen, contact isolation, do not add new abx for cystitis  -GI recs: cont current abx for now. Hold a/c due to risk of bleeding.  - D/C planning for Monday 7/6/18 pending HD arrangements for outpatient.

## 2018-08-04 NOTE — PROGRESS NOTE ADULT - ASSESSMENT
83M w/ PMH of Afib, CHF, CAD s/p stents, COPD, recent PNA on abx, upper GI bleed (duodenal) was admitted for recurrent c. diff colitis. Currently stable. No stool production overnight. 3 loose stools today. CXR shows pulmonary congestion and mild plural effusion. US shows small to moderate amount of abdominal ascites, greatest in the LLQ.

## 2018-08-05 LAB
HCT VFR BLD CALC: 24.9 % — LOW (ref 39–50)
HGB BLD-MCNC: 7.6 G/DL — LOW (ref 13–17)
MAGNESIUM SERPL-MCNC: 1.9 MG/DL — SIGNIFICANT CHANGE UP (ref 1.6–2.6)
MCHC RBC-ENTMCNC: 29.9 PG — SIGNIFICANT CHANGE UP (ref 27–34)
MCHC RBC-ENTMCNC: 30.5 GM/DL — LOW (ref 32–36)
MCV RBC AUTO: 98 FL — SIGNIFICANT CHANGE UP (ref 80–100)
NRBC # BLD: 0 /100 WBCS — SIGNIFICANT CHANGE UP (ref 0–0)
PLATELET # BLD AUTO: 202 K/UL — SIGNIFICANT CHANGE UP (ref 150–400)
RBC # BLD: 2.54 M/UL — LOW (ref 4.2–5.8)
RBC # FLD: 18.6 % — HIGH (ref 10.3–14.5)
WBC # BLD: 6.24 K/UL — SIGNIFICANT CHANGE UP (ref 3.8–10.5)
WBC # FLD AUTO: 6.24 K/UL — SIGNIFICANT CHANGE UP (ref 3.8–10.5)

## 2018-08-05 RX ADMIN — CHOLESTYRAMINE 4 GRAM(S): 4 POWDER, FOR SUSPENSION ORAL at 21:20

## 2018-08-05 RX ADMIN — Medication 500 MILLIGRAM(S): at 17:24

## 2018-08-05 RX ADMIN — PANTOPRAZOLE SODIUM 40 MILLIGRAM(S): 20 TABLET, DELAYED RELEASE ORAL at 17:25

## 2018-08-05 RX ADMIN — Medication 5 MILLIGRAM(S): at 05:23

## 2018-08-05 RX ADMIN — Medication 5 MILLIGRAM(S): at 15:33

## 2018-08-05 RX ADMIN — SIMVASTATIN 10 MILLIGRAM(S): 20 TABLET, FILM COATED ORAL at 21:20

## 2018-08-05 RX ADMIN — Medication 8 MILLIGRAM(S): at 21:21

## 2018-08-05 RX ADMIN — Medication 81 MILLIGRAM(S): at 09:55

## 2018-08-05 RX ADMIN — CHOLESTYRAMINE 4 GRAM(S): 4 POWDER, FOR SUSPENSION ORAL at 09:54

## 2018-08-05 RX ADMIN — HEPARIN SODIUM 5000 UNIT(S): 5000 INJECTION INTRAVENOUS; SUBCUTANEOUS at 17:25

## 2018-08-05 RX ADMIN — PANTOPRAZOLE SODIUM 40 MILLIGRAM(S): 20 TABLET, DELAYED RELEASE ORAL at 05:22

## 2018-08-05 RX ADMIN — Medication 500 MILLIGRAM(S): at 11:59

## 2018-08-05 RX ADMIN — Medication 5 MILLIGRAM(S): at 21:20

## 2018-08-05 RX ADMIN — Medication 500 MILLIGRAM(S): at 05:22

## 2018-08-05 RX ADMIN — HEPARIN SODIUM 5000 UNIT(S): 5000 INJECTION INTRAVENOUS; SUBCUTANEOUS at 05:22

## 2018-08-05 RX ADMIN — Medication 100 MILLIGRAM(S): at 09:54

## 2018-08-05 RX ADMIN — FINASTERIDE 5 MILLIGRAM(S): 5 TABLET, FILM COATED ORAL at 09:55

## 2018-08-05 RX ADMIN — Medication 0.5 MILLIGRAM(S): at 08:22

## 2018-08-05 NOTE — PROGRESS NOTE ADULT - ASSESSMENT
84 y/o wm with hx of ckd stage 3 ( scr 1.5-1.7) with ARYA due to volume depletion from CDiff associated colitis and diarrhea in presence of diuretic use PTA.  Now with non anion gap metabolic acidosis and worsening renal parameters.  CXR with mild CHF with hx of diastolic CHF.    PLAN  - obtain abd and lactate  - will change ivf and add bicarbonate and keep at current rate  - hold lasix done.   - fu uop and scr closely and will monitor respiratory status  - bp and hr stable now, cardizem adjusted and lopressor added    7/9 MK  - ARYA: worsening renal parameters with metabolic acidosis, non ag.  Previous lactate WNL and since placed on bicarbonate drip  fu repeat abg today.  Increase IVF rate  possibility of HD discussed with enio, hcp daughter, pt has been agreeable in past.   DC hydralazine  dc morphine and change to dilaudid  - Cdiff with rising scr and worsening abd distension: CRS consult ongoing  possible repeat ct scan  DW Dr ballesteros    7/10  Anuric  anasarca  Non anion gap acidosis on bicarb drip  ARYA, anuric  CKD 3 history  d/w Family, and pt agreeable  called ICU for line placement and to dialyze the pt in ICU for monitoring    7/10  HD initiated via R temp HD catheter  tolerating well  no complaints  good abf    7/11 SY  --ARYA with Anasarca.  Urine output slightly improved.  However, remains quite fluid overloaded.  Will plan to continue HD until fluid status is much improved.  HD order written.  3 hour tx today.  Will aim to remove 2.5 kg as tolerated.  --C DIff colitis ; continue to monitor closely.    7/12 SY  --ARYA with Anasarca.   Remains Oligo-anuric.    --HD with goal of 2.5 kg UF again today.  --C Diff colitis.   Clinically improved   Continue to monitor.    7/13  The patient was dialyzed 3 days in a row this week  Discussed with the patient's daughter and hospitalist, intensivist  Will skip dialysis today, no urgent need for dialysis  We'll dialyze the patient tomorrow on Saturday and then skip Sunday to dialyze the patient again on Monday.    7/14  We'll dialyze against 4 K bath  Case discussed with the patient's daughter  May need a permanent catheter by Monday      7/15  Dialyze with a 4K bath  Potassium is 3.3 most likely from the diarrhea  Schedule for permacath placement after dialysis on Monday or Tuesday  Patient is comfortable no complications noted at this time    7/16 MK  - ARYA/ CKD stage 3 remains oliguirc and HD dependent.  LImited UF at HD toleated with the   hypokalemia corrected with HD.  Permcath planned today  - Cdiff: on PO vanc.  improved leukocytosis and abd distension     7/17  s/p perm cath  HD tomorrow  4 K bath  replace K   overall stable    7/18 SY  --ARYA/CKD for now remaining dialysis dependent.  Continue TIW HD.  --C Diff colitis ; improving clinically.    7/19 SY  --ARYA/CKD : Continue TIW HD.   Will continue as outpatient for now as no signs of recovery at this point.  --C Diff colitis  : clinically improving.  Continue po abtx.  --D/c Planning  D/w pt's daughter re : rehab with HD.  --Replete K.  Continue regular diet without restrictions for now.    7/20 MK  - ARYA/CKD remains HD dependent with oliguria.  Will attempt UF with HD.  K correction with HD and changed to regular diet with fluid restriction  - Cdiff: PO vanc with improving renal paramenters-  - Episodes of Desat: will have pulmonary re-eval prior to dc.  - DC planning rehab with dialysis  dw Dr Andrade  will see pt    7/21 MK  - ARYA/CKD remains hd dependent. post hd cxr with improved PVC.  Still with episodes of desaturation and dr andrade input noted.  CT with evidence of ascites for paracentesis  - cdiff: on po vanc.  still with loose stools,     7/22 MK  - ARYA/CKD remains HD dependent.  Will coordinate AM HD based upon timing of paracentesis, hypokalemia improved  - hypoxia with ascites: for paracentesis   - cdif on po vanc, rectal tube in place    7/23 MK  - ARYA/CKD remains HD dependent, toelrating hd.  + inc uop   - hypoxia with ascites: s/p paracentesis today, monitor response to oxygenation  - AOCD; fu trend of h/h, on epogen  - cdiff: po vanc    7/24 SY  --ARYA/CKD  Urine output may be increasing.  Follow creat trend to determine further dialysis support.  --Post Paracentesis : improve resp status.  --Anemia : continue LETICIA.  --C Diff : continue po Vanco.    7/25 SY  --ARYA/CKD  Monitor urine output.  Noted with increased creat.  Will maintain on TIW HD since fluid overloaded state is persistent.  --Monitor GI function.  --Post Paracentesis : resp status stabilized.  --Anemia :continue LETICIA.    7/26 SY  --ARYA/CKD  : Creat slowly  trending down.  Urine output not recorded.  Check creat in am to determine whether HD can be held off.  --Recurrent abdominal pain of concern.  Being assessed by primary team and GI.  --Bladder scan to assure full void.    7/27 MK  - ARYA/CKD: with + inc Uop and significant volume depletion overnight.  Bladder scan of 77 ml with stable respiratory status.  NO Hd today and reassess in the AM.      7/28 SY  --ARYA /CKD : positive increase in urine output. However, renal parameters remain elevated.  Therefore, will maintain on TIW HD schedule for now.  --Pt's fluid status has much improved.   Will HD today with no UF and allow increased fluid intake without restrictions.  --C DIff : continue to monitor and continue po abtx.    7/29 SY  --ARYA/CKD   post HD yesterday.   Continue to monitor for signs of renal recovery.  --Fluid overloaded state much improved.  --Monitor on regular diet.  --C Diff : continue po abtx.    7/30 MK  - ARYA/CKD, with stable renal parameters Unclear if with increasing UOP.  Will hold off on HD tomorrow and assess in AM  - Cloudy urine with white sputum production: fu with ua and urine cultures    7/31  minimal cloudy urine   creat up to 4.88  Abd distended \examined w Dr zendejas  Will dialyze today for fluid removal as tolerated, 4 K bath  May need paracentesis as per Dr Zendejas    8/1 MK  - ARYA/CKD will assess in am need for HD  - cdiff; on po vanc/iv flagyll    8/2  HD in am 8/3  t/c for possible HD if drops further  feels better overall    8/3  the patient is seen on dialysis.  Hemoglobin stable at 7.9  Increase erythropoietin to 10,000 units each dialysis    8/4  Tolerating regular diet  Out of bed  Feels well  Status post hemodialysis yesterday  Next dialysis is scheduled for Monday  From a renal standpoint may be discharged early next week.  We'll need to arrange outpatient dialysis    8/5 MK  - ARYA/CKD remains HD dependent,  HD in am and will fu the labs send  fu UOP and DC planning in progress  - Cdif  on po vanc and flagyll  - cloudy urine with clear sputum production: monitor

## 2018-08-05 NOTE — PROGRESS NOTE ADULT - ASSESSMENT
INTERVAL/OVERNIGHT EVENTS: This is a 82 y/o M w/ PMHx of Afib, CHF, CAD s/p stents, COPD, recent PNA on abx, upper GI bleed (duodenal)  BIB EMS from [facility] assisted living for fever, abd pain, and diarrhea that began 3 weeks PTA. Was in the hospital for PNA tx and was later dx with C-diff and started on a course of PO vancomycin for 10 days for the C-diff. States that he has had diarrhea since before the course of abx. Patient with prolonged hospitalization for c. diff very slow to improve. Hospital course complicated by ARYA on CKD3 now hemodialysis dependent.     8/5/18: Patient seen and examined at the bedside. Reports coughing up clear mucus intermittently. Still with diarrhea, but improved compared to previous. Hemodynamically stable. Care plan discussed with patient and family member.

## 2018-08-05 NOTE — PROGRESS NOTE ADULT - SUBJECTIVE AND OBJECTIVE BOX
Patient is a 83y old  Male who presents with a chief complaint of Fever/Diarrhea (20 Jul 2018 13:30)      HPI:  Pt is a 82 y/o M w/pmhx of Afib, CHF, CAD s/p stents, COPD, PNA, upper GI bleed (duodenal)  BIB EMS from [facility] living for fever, abd pain, and diarrhea that began 3 weeks ago. Was in the hospital for PNA tx and was later dx with C-diff and started on a course of PO vancomycin for 10 days for the C-diff. States that he has had diarrhea since before the course of abx. Pain has been increased for the past few weeks and is characterized as a cramping feeling. Daughters also note that there is increased distension. Also notes chest pain characterized as a cramping in the central chest. 83% O2 sat noted this morning at the assisted living facility. Takes O2 routinely at night but not during the day. (06 Jul 2018 23:55)  pt with mild inc RUQ abd pain last night, crampy and unrelated to anything and then resolved, 20 min,  BM times 3 yest, inc in form  neg N V  vida PO  Hx per pt and daughter    PAST MEDICAL & SURGICAL HISTORY:  Diastolic CHF  Peripheral vascular disease  Afib  Anemia  CKD (chronic kidney disease)  COPD (chronic obstructive pulmonary disease)  SHAYY (obstructive sleep apnea)  Sepsis, due to unspecified organism: 2/2 poorly healing wounds b/l  Dyspepsia: On moderate exertion.  Sleep apnea, obstructive: Requires home 02 therapy, and treatment with BIPAP  Atelectasis  Pleural effusion, bilateral  Respiratory failure  Peripheral edema  CRI (chronic renal insufficiency)  Gout  Benign prostatic hypertrophy  Spinal stenosis  Hypercholesterolemia  GERD (gastroesophageal reflux disease)  CAD (coronary artery disease)  Hypertension  S/P angioplasty with stent  Cataract of left eye  Prostate: Surgery green light procedure.  S/P rotator cuff surgery: Right  S/P angioplasty      MEDICATIONS  (STANDING):  ALBUTerol/ipratropium for Nebulization. 3 milliLiter(s) Nebulizer once  allopurinol 100 milliGRAM(s) Oral daily  aspirin  chewable 81 milliGRAM(s) Oral daily  buDESOnide   0.5 milliGRAM(s) Respule 0.5 milliGRAM(s) Inhalation two times a day  cholestyramine Powder (Sugar-Free) 4 Gram(s) Oral two times a day  diltiazem    Tablet 30 milliGRAM(s) Oral every 6 hours  doxazosin 8 milliGRAM(s) Oral at bedtime  epoetin nelly Injectable 3000 Unit(s) IV Push <User Schedule>  epoetin nelly Injectable 4000 Unit(s) IV Push <User Schedule>  finasteride 5 milliGRAM(s) Oral daily  heparin  Injectable 5000 Unit(s) SubCutaneous every 12 hours  metoclopramide 5 milliGRAM(s) Oral three times a day  pantoprazole    Tablet 40 milliGRAM(s) Oral every 12 hours  simvastatin 10 milliGRAM(s) Oral at bedtime  sodium ferric gluconate complex Injectable 125 milliGRAM(s) IV Push <User Schedule>  vancomycin    Solution 500 milliGRAM(s) Oral every 6 hours    MEDICATIONS  (PRN):  acetaminophen   Tablet 650 milliGRAM(s) Oral every 8 hours PRN For Temp greater than 38 C (100.4 F)  acetaminophen   Tablet. 650 milliGRAM(s) Oral every 8 hours PRN Mild Pain (1 - 3)  albumin human 25% IVPB 50 milliLiter(s) IV Intermittent <User Schedule> PRN sbp<=120mmhg  ALBUTerol   0.5% 2.5 milliGRAM(s) Nebulizer every 4 hours PRN Shortness of Breath and/or Wheezing  diphenhydrAMINE   Capsule 25 milliGRAM(s) Oral at bedtime PRN sleep  fluticasone propionate 50 MICROgram(s)/spray Nasal Spray 1 Spray(s) Both Nostrils two times a day PRN runny nose  ondansetron Injectable 4 milliGRAM(s) IV Push every 6 hours PRN Nausea and/or Vomiting      Allergies    Zosyn (Rash)    Intolerances        SOCIAL HISTORY:NC    FAMILY HISTORY:  No pertinent family history in first degree relatives      REVIEW OF SYSTEMS:    CONSTITUTIONAL: No weakness, fevers or chills  EYES/ENT: No visual changes;  No vertigo or throat pain   NECK: No pain or stiffness  RESPIRATORY: No cough, wheezing, hemoptysis; No shortness of breath  CARDIOVASCULAR: No chest pain or palpitations  GENITOURINARY: No dysuria, frequency or hematuria  NEUROLOGICAL: No numbness or weakness  SKIN: No itching, burning, rashes, or lesions   All other review of systems is negative unless indicated above.    Vital Signs Last 24 Hrs  T(C): 36.6 (03 Aug 2018 05:10), Max: 36.6 (03 Aug 2018 05:10)  T(F): 97.9 (03 Aug 2018 05:10), Max: 97.9 (03 Aug 2018 05:10)  HR: 70 (03 Aug 2018 05:10) (70 - 85)  BP: 145/32 (03 Aug 2018 05:10) (132/55 - 145/32)  BP(mean): --  RR: 18 (03 Aug 2018 05:10) (18 - 18)  SpO2: 98% (03 Aug 2018 05:10) (98% - 98%)    PHYSICAL EXAM:    Constitutional: NAD, well-developed  HEENT: EOMI, throat clear  Neck: No LAD, supple  Respiratory: CTA and P  Cardiovascular: S1 and S2, RRR, no M  Gastrointestinal: BS+, soft, NT/mild distension and shifting dullness, neg HSM,  Extremities: pos peripheral edema, neg clubing, cyanosis    Neurological: A/O x 3, no focal deficits  Psychiatric: Normal mood, normal affect  Skin: No rashes    LABS:  CBC Full  -  ( 03 Aug 2018 07:24 )  WBC Count : 7.06 K/uL  Hemoglobin : 7.9 g/dL  Hematocrit : 26.4 %  Platelet Count - Automated : 232 K/uL  Mean Cell Volume : 99.2 fl  Mean Cell Hemoglobin : 29.7 pg  Mean Cell Hemoglobin Concentration : 29.9 gm/dL  Auto Neutrophil # : x  Auto Lymphocyte # : x  Auto Monocyte # : x  Auto Eosinophil # : x  Auto Basophil # : x  Auto Neutrophil % : x  Auto Lymphocyte % : x  Auto Monocyte % : x  Auto Eosinophil % : x  Auto Basophil % : x    08-02    140  |  108  |  34<H>  ----------------------------<  75  3.9   |  24  |  4.09<H>    Ca    7.7<L>      02 Aug 2018 06:52  Phos  3.0     08-02  Mg     2.0     08-03    TPro  x   /  Alb  2.2<L>  /  TBili  x   /  DBili  x   /  AST  x   /  ALT  x   /  AlkPhos  x   08-02            RADIOLOGY & ADDITIONAL STUDIES:  < from: US Abdomen Limited (08.02.18 @ 15:25) >  EXAM:  US ABDOMEN LIMITED                            PROCEDURE DATE:  08/02/2018          INTERPRETATION:  Exam Date: August 2, 2018    Ultrasound of the 4 abdominal quadrants    CLINICAL INFORMATION:   Distended abdomen, evaluate for ascites.      TECHNIQUE:   Transcutaneous ultrasonography limited to the 4 quadrants of   the abdomen was performed for evaluation of ascites.    COMPARISON: Abdominal/pelvic CT dated 7/21/2018.    FINDINGS:       Within the right upper quadrant there is evidence for a right pleural   effusion.    There is trace free fluid about the liver.    Within the right lower, left upper, and left lower quadrants of the   abdomen there is small to moderate amount of abdominal ascites, with   greatest amount within the left lower quadrant.    IMPRESSION:       Small to moderate amount of abdominal ascites, greatest in the left lower   quadrant.    Right pleural effusion.    < end of copied text >

## 2018-08-05 NOTE — PROGRESS NOTE ADULT - PROBLEM SELECTOR PLAN 1
-PO vancomycin 500 mg Q6H  - S/p flagyl  mg q8h (d/c 8/3/18 by ID)  -ID recs: limit abx exposure, cont current abx regimen, contact isolation, do not add new abx for cystitis  - D/C planning for Monday 7/6/18 pending HD arrangements for outpatient.

## 2018-08-05 NOTE — PROGRESS NOTE ADULT - SUBJECTIVE AND OBJECTIVE BOX
INTERVAL/OVERNIGHT EVENTS: This is a 84 y/o M w/ PMHx of Afib, CHF, CAD s/p stents, COPD, recent PNA on abx, upper GI bleed (duodenal)  BIB EMS from [facility] assisted living for fever, abd pain, and diarrhea that began 3 weeks PTA. Was in the hospital for PNA tx and was later dx with C-diff and started on a course of PO vancomycin for 10 days for the C-diff. States that he has had diarrhea since before the course of abx. Patient with prolonged hospitalization for c. diff very slow to improve. Hospital course complicated by ARYA on CKD3 now hemodialysis dependent.     8/5/18: Patient seen and examined at the bedside. Reports coughing up clear mucus intermittently. Still with diarrhea, but improved compared to previous. Hemodynamically stable. Care plan discussed with patient and family member.    MEDICATIONS  (STANDING):  ALBUTerol/ipratropium for Nebulization. 3 milliLiter(s) Nebulizer once  allopurinol 100 milliGRAM(s) Oral daily  aspirin  chewable 81 milliGRAM(s) Oral daily  buDESOnide   0.5 milliGRAM(s) Respule 0.5 milliGRAM(s) Inhalation two times a day  cholestyramine Powder (Sugar-Free) 4 Gram(s) Oral two times a day  diltiazem    Tablet 30 milliGRAM(s) Oral every 6 hours  doxazosin 8 milliGRAM(s) Oral at bedtime  epoetin nelly Injectable 90077 Unit(s) IV Push <User Schedule>  finasteride 5 milliGRAM(s) Oral daily  heparin  Injectable 5000 Unit(s) SubCutaneous every 12 hours  metoclopramide 5 milliGRAM(s) Oral three times a day  pantoprazole    Tablet 40 milliGRAM(s) Oral every 12 hours  simvastatin 10 milliGRAM(s) Oral at bedtime  sodium ferric gluconate complex Injectable 125 milliGRAM(s) IV Push <User Schedule>  vancomycin    Solution 500 milliGRAM(s) Oral every 6 hours    MEDICATIONS  (PRN):  acetaminophen   Tablet 650 milliGRAM(s) Oral every 8 hours PRN For Temp greater than 38 C (100.4 F)  acetaminophen   Tablet. 650 milliGRAM(s) Oral every 8 hours PRN Mild Pain (1 - 3)  albumin human 25% IVPB 50 milliLiter(s) IV Intermittent <User Schedule> PRN sbp<=120mmhg  ALBUTerol   0.5% 2.5 milliGRAM(s) Nebulizer every 4 hours PRN Shortness of Breath and/or Wheezing  diphenhydrAMINE   Capsule 25 milliGRAM(s) Oral at bedtime PRN sleep  fluticasone propionate 50 MICROgram(s)/spray Nasal Spray 1 Spray(s) Both Nostrils two times a day PRN runny nose  nystatin Powder 1 Application(s) Topical two times a day PRN fungal rash  ondansetron Injectable 4 milliGRAM(s) IV Push every 6 hours PRN Nausea and/or Vomiting    Allergies    Zosyn (Rash)    REVIEW OF SYSTEMS:    CONSTITUTIONAL: +generalized weakness, no fevers or chills  EYES/ENT: No visual changes;  No vertigo or throat pain   NECK: No pain or stiffness  RESPIRATORY: No cough, wheezing, hemoptysis; No shortness of breath  CARDIOVASCULAR: No chest pain or palpitations  GASTROINTESTINAL: No abdominal or epigastric pain. No nausea, vomiting, or hematemesis; + diarrhea.No melena or hematochezia.  GENITOURINARY: No dysuria, frequency or hematuria  NEUROLOGICAL: No numbness or weakness  SKIN: No itching, burning, rashes, or lesions   All other review of systems is negative unless indicated above.    Vital Signs Last 24 Hrs  T(C): 36.6 (05 Aug 2018 12:00), Max: 37.1 (04 Aug 2018 17:25)  T(F): 97.9 (05 Aug 2018 12:00), Max: 98.7 (04 Aug 2018 17:25)  HR: 77 (05 Aug 2018 12:00) (61 - 77)  BP: 139/48 (05 Aug 2018 12:00) (135/32 - 154/36)  BP(mean): --  RR: 18 (05 Aug 2018 12:00) (18 - 18)  SpO2: 96% (05 Aug 2018 12:00) (96% - 100%)  I&O's Summary    04 Aug 2018 07:01  -  05 Aug 2018 07:00  --------------------------------------------------------  IN: 300 mL / OUT: 0 mL / NET: 300 mL    VS reviewed, stable.  Gen: patient is frail, but in no acute distress  HEENT: NC/AT, no conjunctivitis or scleral icterus  Neck: FROM, supple, no cervical LAD  Chest: CTA b/l, no crackles/wheezes, good air entry, no tachypnea or retractions  CV: regular rate and rhythm, no murmurs   Abd: soft, non-tender, mild distention, no HSM appreciated, +BS  Back: no vertebral or paraspinal tenderness along entire spine; no CVAT  Extrem: 2+ peripheral pulses, WWP.   Neuro: non-focal    Interval Lab Results:                        7.6    6.24  )-----------( 202      ( 05 Aug 2018 08:00 )             24.9                         7.6    5.52  )-----------( 193      ( 04 Aug 2018 07:34 )             24.9                         7.9    7.06  )-----------( 232      ( 03 Aug 2018 07:24 )             26.4                               x      |  x      |  x                   Calcium: x     / iCa: x      (08-05 @ 08:00)    ----------------------------<  x         Magnesium: 1.9                              x       |  x      |  x                Phosphorous: x

## 2018-08-06 LAB
ALBUMIN SERPL ELPH-MCNC: 2.3 G/DL — LOW (ref 3.3–5)
ANION GAP SERPL CALC-SCNC: 9 MMOL/L — SIGNIFICANT CHANGE UP (ref 5–17)
BLD GP AB SCN SERPL QL: SIGNIFICANT CHANGE UP
BUN SERPL-MCNC: 44 MG/DL — HIGH (ref 7–23)
CALCIUM SERPL-MCNC: 7.7 MG/DL — LOW (ref 8.5–10.1)
CHLORIDE SERPL-SCNC: 110 MMOL/L — HIGH (ref 96–108)
CO2 SERPL-SCNC: 23 MMOL/L — SIGNIFICANT CHANGE UP (ref 22–31)
CREAT SERPL-MCNC: 4.09 MG/DL — HIGH (ref 0.5–1.3)
GLUCOSE SERPL-MCNC: 75 MG/DL — SIGNIFICANT CHANGE UP (ref 70–99)
HCT VFR BLD CALC: 24.1 % — LOW (ref 39–50)
HGB BLD-MCNC: 7.3 G/DL — LOW (ref 13–17)
MAGNESIUM SERPL-MCNC: 1.8 MG/DL — SIGNIFICANT CHANGE UP (ref 1.6–2.6)
MCHC RBC-ENTMCNC: 30.2 PG — SIGNIFICANT CHANGE UP (ref 27–34)
MCHC RBC-ENTMCNC: 30.3 GM/DL — LOW (ref 32–36)
MCV RBC AUTO: 99.6 FL — SIGNIFICANT CHANGE UP (ref 80–100)
NRBC # BLD: 0 /100 WBCS — SIGNIFICANT CHANGE UP (ref 0–0)
PHOSPHATE SERPL-MCNC: 3.9 MG/DL — SIGNIFICANT CHANGE UP (ref 2.5–4.5)
PLATELET # BLD AUTO: 185 K/UL — SIGNIFICANT CHANGE UP (ref 150–400)
POTASSIUM SERPL-MCNC: 4.1 MMOL/L — SIGNIFICANT CHANGE UP (ref 3.5–5.3)
POTASSIUM SERPL-SCNC: 4.1 MMOL/L — SIGNIFICANT CHANGE UP (ref 3.5–5.3)
RBC # BLD: 2.42 M/UL — LOW (ref 4.2–5.8)
RBC # FLD: 18.4 % — HIGH (ref 10.3–14.5)
SODIUM SERPL-SCNC: 142 MMOL/L — SIGNIFICANT CHANGE UP (ref 135–145)
TYPE + AB SCN PNL BLD: SIGNIFICANT CHANGE UP
WBC # BLD: 5.01 K/UL — SIGNIFICANT CHANGE UP (ref 3.8–10.5)
WBC # FLD AUTO: 5.01 K/UL — SIGNIFICANT CHANGE UP (ref 3.8–10.5)

## 2018-08-06 RX ADMIN — Medication 125 MILLIGRAM(S): at 15:22

## 2018-08-06 RX ADMIN — Medication 100 MILLIGRAM(S): at 17:17

## 2018-08-06 RX ADMIN — Medication 5 MILLIGRAM(S): at 22:20

## 2018-08-06 RX ADMIN — FINASTERIDE 5 MILLIGRAM(S): 5 TABLET, FILM COATED ORAL at 17:16

## 2018-08-06 RX ADMIN — Medication 81 MILLIGRAM(S): at 17:16

## 2018-08-06 RX ADMIN — Medication 0.5 MILLIGRAM(S): at 22:01

## 2018-08-06 RX ADMIN — PANTOPRAZOLE SODIUM 40 MILLIGRAM(S): 20 TABLET, DELAYED RELEASE ORAL at 06:26

## 2018-08-06 RX ADMIN — PANTOPRAZOLE SODIUM 40 MILLIGRAM(S): 20 TABLET, DELAYED RELEASE ORAL at 17:16

## 2018-08-06 RX ADMIN — Medication 8 MILLIGRAM(S): at 22:20

## 2018-08-06 RX ADMIN — Medication 0.5 MILLIGRAM(S): at 11:00

## 2018-08-06 RX ADMIN — SIMVASTATIN 10 MILLIGRAM(S): 20 TABLET, FILM COATED ORAL at 22:20

## 2018-08-06 RX ADMIN — Medication 5 MILLIGRAM(S): at 06:26

## 2018-08-06 RX ADMIN — HEPARIN SODIUM 5000 UNIT(S): 5000 INJECTION INTRAVENOUS; SUBCUTANEOUS at 06:26

## 2018-08-06 RX ADMIN — HEPARIN SODIUM 5000 UNIT(S): 5000 INJECTION INTRAVENOUS; SUBCUTANEOUS at 17:16

## 2018-08-06 RX ADMIN — Medication 500 MILLIGRAM(S): at 00:22

## 2018-08-06 RX ADMIN — Medication 500 MILLIGRAM(S): at 17:15

## 2018-08-06 RX ADMIN — CHOLESTYRAMINE 4 GRAM(S): 4 POWDER, FOR SUSPENSION ORAL at 22:20

## 2018-08-06 RX ADMIN — Medication 5 MILLIGRAM(S): at 17:16

## 2018-08-06 RX ADMIN — ERYTHROPOIETIN 10000 UNIT(S): 10000 INJECTION, SOLUTION INTRAVENOUS; SUBCUTANEOUS at 15:20

## 2018-08-06 RX ADMIN — Medication 500 MILLIGRAM(S): at 06:26

## 2018-08-06 NOTE — PROGRESS NOTE ADULT - SUBJECTIVE AND OBJECTIVE BOX
CHIEF COMPLAINT: diarrhea * 3 weeks    SUBJECTIVE:   HPI: This is a 82 y/o M w/pmhx of Afib, CHF, CAD s/p stents, COPD, recent PNA on abx, upper GI bleed (duodenal)  BIB EMS from [facility] for fever, abd pain, and diarrhea that began 3 weeks prior to admission. Was in the hospital for PNA tx and was later dx with C-diff and started on a course of PO vancomycin for 10 days for the C-diff. States that he has had diarrhea since before the course of abx. Pain has been increased for the past few weeks and is characterized as a cramping feeling. Daughters also note that there is increased distension. Also notes chest pain characterized as a cramping in the central chest. 83% O2 sat noted morning of admission on 7/6/18, at the [facility] living facility. Takes O2 routinely at night but not during the day.    8/6/18:  Pt was seen and evaluated at bedside. Pt has no acute complaints. Reports abdominal distension is better than before but still moderately distended. Pt reports no diarrhea overnight and this morning. Tolerating po diet. Denies cp, sob, n/v. Reports mild RLQ cramping better than previous days.       REVIEW OF SYSTEMS:  CONSTITUTIONAL: No weakness, fevers or chills  EYES/ENT: No visual changes;  No vertigo or throat pain   NECK: No pain or stiffness  RESPIRATORY: No cough, wheezing, hemoptysis; No shortness of breath  CARDIOVASCULAR: No chest pain or palpitations  GASTROINTESTINAL: No nausea, vomiting, or hematemesis; No diarrhea or constipation. No melena or hematochezia.  GENITOURINARY: No dysuria, frequency or hematuria  NEUROLOGICAL: No numbness or weakness  SKIN: No itching, burning, rashes, or lesions   All other review of systems is negative unless indicated above    Vital Signs Last 24 Hrs  T(C): 36.9 (06 Aug 2018 14:26), Max: 37.3 (05 Aug 2018 17:21)  T(F): 98.4 (06 Aug 2018 14:26), Max: 99.2 (05 Aug 2018 17:21)  HR: 63 (06 Aug 2018 14:26) (63 - 79)  BP: 121/43 (06 Aug 2018 14:26) (112/42 - 164/32)  BP(mean): --  RR: 18 (06 Aug 2018 14:26) (18 - 18)  SpO2: 95% (06 Aug 2018 05:17) (95% - 98%)    I&O's Summary    06 Aug 2018 07:01  -  06 Aug 2018 14:37  --------------------------------------------------------  IN: 350 mL / OUT: 0 mL / NET: 350 mL        CAPILLARY BLOOD GLUCOSE          PHYSICAL EXAM:  Constitutional: NAD, awake and alert.  HEENT: PERR, Normal Hearing.  Neck: Soft and supple, No JVD  Respiratory: CTAB  Cardiovascular: S1 and S2, RRR.  Gastrointestinal: Bowel Sounds present, soft, RLQ tenderness, moderate distension.   Extremities: No peripheral edema  Vascular: 2+ peripheral pulses  Neurological: A/O x 3.      MEDICATIONS:  MEDICATIONS  (STANDING):  ALBUTerol/ipratropium for Nebulization. 3 milliLiter(s) Nebulizer once  allopurinol 100 milliGRAM(s) Oral daily  aspirin  chewable 81 milliGRAM(s) Oral daily  buDESOnide   0.5 milliGRAM(s) Respule 0.5 milliGRAM(s) Inhalation two times a day  cholestyramine Powder (Sugar-Free) 4 Gram(s) Oral two times a day  diltiazem    Tablet 30 milliGRAM(s) Oral every 6 hours  doxazosin 8 milliGRAM(s) Oral at bedtime  epoetin nelly Injectable 79409 Unit(s) IV Push <User Schedule>  finasteride 5 milliGRAM(s) Oral daily  heparin  Injectable 5000 Unit(s) SubCutaneous every 12 hours  metoclopramide 5 milliGRAM(s) Oral three times a day  pantoprazole    Tablet 40 milliGRAM(s) Oral every 12 hours  simvastatin 10 milliGRAM(s) Oral at bedtime  sodium ferric gluconate complex Injectable 125 milliGRAM(s) IV Push <User Schedule>  vancomycin    Solution 500 milliGRAM(s) Oral every 6 hours      LABS: All Labs Reviewed:                        7.3    5.01  )-----------( 185      ( 06 Aug 2018 08:32 )             24.1     08-06    142  |  110<H>  |  44<H>  ----------------------------<  75  4.1   |  23  |  4.09<H>    Ca    7.7<L>      06 Aug 2018 08:32  Phos  3.9     08-06  Mg     1.8     08-06    TPro  x   /  Alb  2.3<L>  /  TBili  x   /  DBili  x   /  AST  x   /  ALT  x   /  AlkPhos  x   08-06          Blood Culture:     RADIOLOGY/EKG:    DVT PPX:    ADVANCED DIRECTIVE:    DISPOSITION:

## 2018-08-06 NOTE — PROGRESS NOTE ADULT - ASSESSMENT
82 y/o wm with hx of ckd stage 3 ( scr 1.5-1.7) with ARYA due to volume depletion from CDiff associated colitis and diarrhea in presence of diuretic use PTA.  Now with non anion gap metabolic acidosis and worsening renal parameters.  CXR with mild CHF with hx of diastolic CHF.    PLAN  - obtain abd and lactate  - will change ivf and add bicarbonate and keep at current rate  - hold lasix done.   - fu uop and scr closely and will monitor respiratory status  - bp and hr stable now, cardizem adjusted and lopressor added    7/9 MK  - ARYA: worsening renal parameters with metabolic acidosis, non ag.  Previous lactate WNL and since placed on bicarbonate drip  fu repeat abg today.  Increase IVF rate  possibility of HD discussed with enio, hcp daughter, pt has been agreeable in past.   DC hydralazine  dc morphine and change to dilaudid  - Cdiff with rising scr and worsening abd distension: CRS consult ongoing  possible repeat ct scan  DW Dr ballesteros    7/10  Anuric  anasarca  Non anion gap acidosis on bicarb drip  ARYA, anuric  CKD 3 history  d/w Family, and pt agreeable  called ICU for line placement and to dialyze the pt in ICU for monitoring    7/10  HD initiated via R temp HD catheter  tolerating well  no complaints  good abf    7/11 SY  --ARYA with Anasarca.  Urine output slightly improved.  However, remains quite fluid overloaded.  Will plan to continue HD until fluid status is much improved.  HD order written.  3 hour tx today.  Will aim to remove 2.5 kg as tolerated.  --C DIff colitis ; continue to monitor closely.    7/12 SY  --ARYA with Anasarca.   Remains Oligo-anuric.    --HD with goal of 2.5 kg UF again today.  --C Diff colitis.   Clinically improved   Continue to monitor.    7/13  The patient was dialyzed 3 days in a row this week  Discussed with the patient's daughter and hospitalist, intensivist  Will skip dialysis today, no urgent need for dialysis  We'll dialyze the patient tomorrow on Saturday and then skip Sunday to dialyze the patient again on Monday.    7/14  We'll dialyze against 4 K bath  Case discussed with the patient's daughter  May need a permanent catheter by Monday      7/15  Dialyze with a 4K bath  Potassium is 3.3 most likely from the diarrhea  Schedule for permacath placement after dialysis on Monday or Tuesday  Patient is comfortable no complications noted at this time    7/16 MK  - ARYA/ CKD stage 3 remains oliguirc and HD dependent.  LImited UF at HD toleated with the   hypokalemia corrected with HD.  Permcath planned today  - Cdiff: on PO vanc.  improved leukocytosis and abd distension     7/17  s/p perm cath  HD tomorrow  4 K bath  replace K   overall stable    7/18 SY  --ARYA/CKD for now remaining dialysis dependent.  Continue TIW HD.  --C Diff colitis ; improving clinically.    7/19 SY  --ARYA/CKD : Continue TIW HD.   Will continue as outpatient for now as no signs of recovery at this point.  --C Diff colitis  : clinically improving.  Continue po abtx.  --D/c Planning  D/w pt's daughter re : rehab with HD.  --Replete K.  Continue regular diet without restrictions for now.    7/20 MK  - ARYA/CKD remains HD dependent with oliguria.  Will attempt UF with HD.  K correction with HD and changed to regular diet with fluid restriction  - Cdiff: PO vanc with improving renal paramenters-  - Episodes of Desat: will have pulmonary re-eval prior to dc.  - DC planning rehab with dialysis  dw Dr Andrade  will see pt    7/21 MK  - ARYA/CKD remains hd dependent. post hd cxr with improved PVC.  Still with episodes of desaturation and dr andrade input noted.  CT with evidence of ascites for paracentesis  - cdiff: on po vanc.  still with loose stools,     7/22 MK  - ARYA/CKD remains HD dependent.  Will coordinate AM HD based upon timing of paracentesis, hypokalemia improved  - hypoxia with ascites: for paracentesis   - cdif on po vanc, rectal tube in place    7/23 MK  - ARYA/CKD remains HD dependent, toelrating hd.  + inc uop   - hypoxia with ascites: s/p paracentesis today, monitor response to oxygenation  - AOCD; fu trend of h/h, on epogen  - cdiff: po vanc    7/24 SY  --ARYA/CKD  Urine output may be increasing.  Follow creat trend to determine further dialysis support.  --Post Paracentesis : improve resp status.  --Anemia : continue LETICIA.  --C Diff : continue po Vanco.    7/25 SY  --ARYA/CKD  Monitor urine output.  Noted with increased creat.  Will maintain on TIW HD since fluid overloaded state is persistent.  --Monitor GI function.  --Post Paracentesis : resp status stabilized.  --Anemia :continue LETICIA.    7/26 SY  --ARYA/CKD  : Creat slowly  trending down.  Urine output not recorded.  Check creat in am to determine whether HD can be held off.  --Recurrent abdominal pain of concern.  Being assessed by primary team and GI.  --Bladder scan to assure full void.    7/27 MK  - ARYA/CKD: with + inc Uop and significant volume depletion overnight.  Bladder scan of 77 ml with stable respiratory status.  NO Hd today and reassess in the AM.      7/28 SY  --ARYA /CKD : positive increase in urine output. However, renal parameters remain elevated.  Therefore, will maintain on TIW HD schedule for now.  --Pt's fluid status has much improved.   Will HD today with no UF and allow increased fluid intake without restrictions.  --C DIff : continue to monitor and continue po abtx.    7/29 SY  --ARYA/CKD   post HD yesterday.   Continue to monitor for signs of renal recovery.  --Fluid overloaded state much improved.  --Monitor on regular diet.  --C Diff : continue po abtx.    7/30 MK  - ARYA/CKD, with stable renal parameters Unclear if with increasing UOP.  Will hold off on HD tomorrow and assess in AM  - Cloudy urine with white sputum production: fu with ua and urine cultures    7/31  minimal cloudy urine   creat up to 4.88  Abd distended \examined w Dr zendjeas  Will dialyze today for fluid removal as tolerated, 4 K bath  May need paracentesis as per Dr Zendejas    8/1 MK  - ARYA/CKD will assess in am need for HD  - cdiff; on po vanc/iv flagyll    8/2  HD in am 8/3  t/c for possible HD if drops further  feels better overall    8/3  the patient is seen on dialysis.  Hemoglobin stable at 7.9  Increase erythropoietin to 10,000 units each dialysis    8/4  Tolerating regular diet  Out of bed  Feels well  Status post hemodialysis yesterday  Next dialysis is scheduled for Monday  From a renal standpoint may be discharged early next week.  We'll need to arrange outpatient dialysis    8/5 MK  - ARYA/CKD remains HD dependent,  HD in am and will fu the labs send  fu UOP and DC planning in progress  - Cdif  on po vanc and flagyll  - cloudy urine with clear sputum production: monitor    8/6 MK  - ARYA/CKD with possible inc UOP. , Hd today with transfusion in anticipation of transfer to rehab today/tomorrow  - Cdif on po vanc and flagyl  - anemia: transfusion and epo with hd

## 2018-08-06 NOTE — PROGRESS NOTE ADULT - PROBLEM SELECTOR PLAN 3
- s/p Tunnel Cath on 7/16/18, patient currently HD dependent  - Nephro consult appreciated: cont HD, Creatinine worsened past couple days   - Tranfusion and EPO with HD today.

## 2018-08-06 NOTE — PROGRESS NOTE ADULT - SUBJECTIVE AND OBJECTIVE BOX
Patient is a 83y old  Male who presents with a chief complaint of Fever/Diarrhea (20 Jul 2018 13:30)      HPI:  Pt is a 82 y/o M w/pmhx of Afib, CHF, CAD s/p stents, COPD, PNA, upper GI bleed (duodenal)  BIB EMS from [facility] living for fever, abd pain, and diarrhea that began 3 weeks ago. Was in the hospital for PNA tx and was later dx with C-diff and started on a course of PO vancomycin for 10 days for the C-diff. States that he has had diarrhea since before the course of abx. Pain has been increased for the past few weeks and is characterized as a cramping feeling. Daughters also note that there is increased distension. Also notes chest pain characterized as a cramping in the central chest. 83% O2 sat noted this morning at the assisted living facility. Takes O2 routinely at night but not during the day. (06 Jul 2018 23:55)  Patient comfortable. He did have some mild abdominal pain last night but much better than before. Denies any nausea or vomiting. Diarrhea is improving.  Abdominal pain was in the lower abdomen, without any radiation, has a hard time describing it.      PAST MEDICAL & SURGICAL HISTORY:  Diastolic CHF  Peripheral vascular disease  Afib  Anemia  CKD (chronic kidney disease)  COPD (chronic obstructive pulmonary disease)  SHAYY (obstructive sleep apnea)  Sepsis, due to unspecified organism: 2/2 poorly healing wounds b/l  Dyspepsia: On moderate exertion.  Sleep apnea, obstructive: Requires home 02 therapy, and treatment with BIPAP  Atelectasis  Pleural effusion, bilateral  Respiratory failure  Peripheral edema  CRI (chronic renal insufficiency)  Gout  Benign prostatic hypertrophy  Spinal stenosis  Hypercholesterolemia  GERD (gastroesophageal reflux disease)  CAD (coronary artery disease)  Hypertension  S/P angioplasty with stent  Cataract of left eye  Prostate: Surgery green light procedure.  S/P rotator cuff surgery: Right  S/P angioplasty      MEDICATIONS  (STANDING):  ALBUTerol/ipratropium for Nebulization. 3 milliLiter(s) Nebulizer once  allopurinol 100 milliGRAM(s) Oral daily  aspirin  chewable 81 milliGRAM(s) Oral daily  buDESOnide   0.5 milliGRAM(s) Respule 0.5 milliGRAM(s) Inhalation two times a day  cholestyramine Powder (Sugar-Free) 4 Gram(s) Oral two times a day  diltiazem    Tablet 30 milliGRAM(s) Oral every 6 hours  doxazosin 8 milliGRAM(s) Oral at bedtime  epoetin nelly Injectable 94545 Unit(s) IV Push <User Schedule>  finasteride 5 milliGRAM(s) Oral daily  heparin  Injectable 5000 Unit(s) SubCutaneous every 12 hours  metoclopramide 5 milliGRAM(s) Oral three times a day  pantoprazole    Tablet 40 milliGRAM(s) Oral every 12 hours  simvastatin 10 milliGRAM(s) Oral at bedtime  sodium ferric gluconate complex Injectable 125 milliGRAM(s) IV Push <User Schedule>  vancomycin    Solution 500 milliGRAM(s) Oral every 6 hours    MEDICATIONS  (PRN):  acetaminophen   Tablet 650 milliGRAM(s) Oral every 8 hours PRN For Temp greater than 38 C (100.4 F)  acetaminophen   Tablet. 650 milliGRAM(s) Oral every 8 hours PRN Mild Pain (1 - 3)  albumin human 25% IVPB 50 milliLiter(s) IV Intermittent <User Schedule> PRN sbp<=120mmhg  ALBUTerol   0.5% 2.5 milliGRAM(s) Nebulizer every 4 hours PRN Shortness of Breath and/or Wheezing  diphenhydrAMINE   Capsule 25 milliGRAM(s) Oral at bedtime PRN sleep  fluticasone propionate 50 MICROgram(s)/spray Nasal Spray 1 Spray(s) Both Nostrils two times a day PRN runny nose  nystatin Powder 1 Application(s) Topical two times a day PRN fungal rash  ondansetron Injectable 4 milliGRAM(s) IV Push every 6 hours PRN Nausea and/or Vomiting      Allergies    Zosyn (Rash)    Intolerances        SOCIAL HISTORY:    FAMILY HISTORY:  No pertinent family history in first degree relatives      REVIEW OF SYSTEMS:    CONSTITUTIONAL: No weakness, fevers or chills  EYES/ENT: No visual changes;  No vertigo or throat pain   NECK: No pain or stiffness  RESPIRATORY: No cough, wheezing, hemoptysis; No shortness of breath  CARDIOVASCULAR: No chest pain or palpitations  GENITOURINARY: No dysuria, frequency or hematuria  NEUROLOGICAL: No numbness or weakness  SKIN: No itching, burning, rashes, or lesions   All other review of systems is negative unless indicated above.    Vital Signs Last 24 Hrs  T(C): 36.8 (06 Aug 2018 16:11), Max: 37.3 (05 Aug 2018 17:21)  T(F): 98.2 (06 Aug 2018 16:11), Max: 99.2 (05 Aug 2018 17:21)  HR: 83 (06 Aug 2018 16:14) (63 - 88)  BP: 156/65 (06 Aug 2018 16:14) (112/42 - 164/32)  BP(mean): --  RR: 18 (06 Aug 2018 16:11) (18 - 18)  SpO2: 95% (06 Aug 2018 05:17) (95% - 98%)    PHYSICAL EXAM:    Constitutional: NAD, well-developed  HEENT: EOMI, throat clear  Neck: No LAD, supple  Respiratory: CTA and P  Cardiovascular: S1 and S2, RRR, no M  Gastrointestinal: BS+, soft, NT/ND, neg HSM,  Extremities: No peripheral edema, neg clubing, cyanosis  Vascular: 2+ peripheral pulses  Neurological: A/O x 3, no focal deficits  Psychiatric: Normal mood, normal affect  Skin: No rashes    LABS:  CBC Full  -  ( 06 Aug 2018 08:32 )  WBC Count : 5.01 K/uL  Hemoglobin : 7.3 g/dL  Hematocrit : 24.1 %  Platelet Count - Automated : 185 K/uL  Mean Cell Volume : 99.6 fl  Mean Cell Hemoglobin : 30.2 pg  Mean Cell Hemoglobin Concentration : 30.3 gm/dL  Auto Neutrophil # : x  Auto Lymphocyte # : x  Auto Monocyte # : x  Auto Eosinophil # : x  Auto Basophil # : x  Auto Neutrophil % : x  Auto Lymphocyte % : x  Auto Monocyte % : x  Auto Eosinophil % : x  Auto Basophil % : x    08-06    142  |  110<H>  |  44<H>  ----------------------------<  75  4.1   |  23  |  4.09<H>    Ca    7.7<L>      06 Aug 2018 08:32  Phos  3.9     08-06  Mg     1.8     08-06    TPro  x   /  Alb  2.3<L>  /  TBili  x   /  DBili  x   /  AST  x   /  ALT  x   /  AlkPhos  x   08-06            RADIOLOGY & ADDITIONAL STUDIES:  < from: US Abdomen Limited (08.02.18 @ 15:25) >      PROCEDURE DATE:  08/02/2018          INTERPRETATION:  Exam Date: August 2, 2018    Ultrasound of the 4 abdominal quadrants    CLINICAL INFORMATION:   Distended abdomen, evaluate for ascites.      TECHNIQUE:   Transcutaneous ultrasonography limited to the 4 quadrants of   the abdomen was performed for evaluation of ascites.    COMPARISON: Abdominal/pelvic CT dated 7/21/2018.    FINDINGS:       Within the right upper quadrant there is evidence for a right pleural   effusion.    There is trace free fluid about the liver.    Within the right lower, left upper, and left lower quadrants of the   abdomen there is small to moderate amount of abdominal ascites, with   greatest amount within the left lower quadrant.    IMPRESSION:       Small to moderate amount of abdominal ascites, greatest in the left lower   quadrant.    Right pleural effusion.        < end of copied text >

## 2018-08-06 NOTE — PROGRESS NOTE ADULT - PROBLEM SELECTOR PLAN 1
- Vancomycin po.  - S/p flagyl  mg q8h (d/c 8/3/18 by ID)  -ID recs: limit abx exposure, cont current abx regimen, contact isolation, do not add new abx for cystitis  -GI recs: cont current abx for now. Hold a/c due to risk of bleeding.  - D/C planning for today 7/6/18 or tomorrow 7/7/18 pending HD arrangements for outpatient.

## 2018-08-06 NOTE — PROGRESS NOTE ADULT - ASSESSMENT
c diff colitis  By mouth vancomycin, high dose   case discussed with  family  Patient with continued abdominal pain but improving and possible inc diarrhea, improving slowly      Patient had a recent course of C. difficile that was treated with vancomycin with a recurrence and he's had C. difficile in the past.  Due to severe C. difficile, would strongly consider a prolonged taper of vancomycin.  For now, would complete two-week course of vancomycin 500 qid  and then would taper her vancomycin over a long time.  Discussed with family.  Taper vancomycin  Vancomycin 500 mg 4 times a day, total of 2 weeks  Then 3 times a day for 2 weeks, then twice a day for one week, then daily for one week, then every other day for 1 week, then every 3rd day for 1 week.  Follow-up with me after that  Vanco taper eventually. However At this time, would continue high-dose vancomycin with Flagyl. Is improving slowly on it.  Encourage by mouth intake, encourage physical therapy    cough/sneeze, clinical FU and hospitalist management    DW hospitalist      A fibrillation    COPD obesity, obstructive sleep apnea, coronary artery disease, overall comorbid risk factors     IVF per renal  HD as needed, but is making more urine and it is encouraging

## 2018-08-06 NOTE — PROGRESS NOTE ADULT - ASSESSMENT
83M w/ PMH of Afib, CHF, CAD s/p stents, COPD, recent PNA on abx, upper GI bleed (duodenal) was admitted for recurrent c. diff colitis. Currently stable. No stool production overnight and this morning. CXR shows pulmonary congestion and mild plural effusion. US shows small to moderate amount of abdominal ascites, greatest in the LLQ. VSS.

## 2018-08-06 NOTE — PROGRESS NOTE ADULT - SUBJECTIVE AND OBJECTIVE BOX
NEPHROLOGY INTERVAL HPI/OVERNIGHT EVENTS:  UOP of 4-5 x   no diarrhea  tolerating po      MEDICATIONS  (STANDING):  ALBUTerol/ipratropium for Nebulization. 3 milliLiter(s) Nebulizer once  allopurinol 100 milliGRAM(s) Oral daily  aspirin  chewable 81 milliGRAM(s) Oral daily  buDESOnide   0.5 milliGRAM(s) Respule 0.5 milliGRAM(s) Inhalation two times a day  cholestyramine Powder (Sugar-Free) 4 Gram(s) Oral two times a day  diltiazem    Tablet 30 milliGRAM(s) Oral every 6 hours  doxazosin 8 milliGRAM(s) Oral at bedtime  epoetin nelly Injectable 77537 Unit(s) IV Push <User Schedule>  finasteride 5 milliGRAM(s) Oral daily  heparin  Injectable 5000 Unit(s) SubCutaneous every 12 hours  metoclopramide 5 milliGRAM(s) Oral three times a day  pantoprazole    Tablet 40 milliGRAM(s) Oral every 12 hours  simvastatin 10 milliGRAM(s) Oral at bedtime  sodium ferric gluconate complex Injectable 125 milliGRAM(s) IV Push <User Schedule>  vancomycin    Solution 500 milliGRAM(s) Oral every 6 hours    MEDICATIONS  (PRN):  acetaminophen   Tablet 650 milliGRAM(s) Oral every 8 hours PRN For Temp greater than 38 C (100.4 F)  acetaminophen   Tablet. 650 milliGRAM(s) Oral every 8 hours PRN Mild Pain (1 - 3)  albumin human 25% IVPB 50 milliLiter(s) IV Intermittent <User Schedule> PRN sbp<=120mmhg  ALBUTerol   0.5% 2.5 milliGRAM(s) Nebulizer every 4 hours PRN Shortness of Breath and/or Wheezing  diphenhydrAMINE   Capsule 25 milliGRAM(s) Oral at bedtime PRN sleep  fluticasone propionate 50 MICROgram(s)/spray Nasal Spray 1 Spray(s) Both Nostrils two times a day PRN runny nose  nystatin Powder 1 Application(s) Topical two times a day PRN fungal rash  ondansetron Injectable 4 milliGRAM(s) IV Push every 6 hours PRN Nausea and/or Vomiting      Allergies    Zosyn (Rash)    Intolerances        I&O's Detail      .    Patient was seen and evaluated on dialysis.   Patient is tolerating the procedure well.   Continue dialysis:   Dialyzer:  Revaclear with bfr of 350-400 on 2k with uf goal of 2kg   PRBC transfusion     Vital Signs Last 24 Hrs  T(C): 36.9 (06 Aug 2018 05:17), Max: 37.3 (05 Aug 2018 17:21)  T(F): 98.4 (06 Aug 2018 05:17), Max: 99.2 (05 Aug 2018 17:21)  HR: 68 (06 Aug 2018 11:00) (68 - 74)  BP: 164/32 (06 Aug 2018 05:17) (152/39 - 164/32)  BP(mean): --  RR: 18 (06 Aug 2018 05:17) (18 - 18)  SpO2: 95% (06 Aug 2018 05:17) (95% - 98%)  Daily     Daily     PHYSICAL EXAM:  General: alert. awake Ox3  HEENT: MMM  CV: s1s2 rrr  LUNGS: B/L CTA  EXT: no edema    LABS:                        7.3    5.01  )-----------( 185      ( 06 Aug 2018 08:32 )             24.1     08-06    142  |  110<H>  |  44<H>  ----------------------------<  75  4.1   |  23  |  4.09<H>    Ca    7.7<L>      06 Aug 2018 08:32  Phos  3.9     08-06  Mg     1.8     08-06    TPro  x   /  Alb  2.3<L>  /  TBili  x   /  DBili  x   /  AST  x   /  ALT  x   /  AlkPhos  x   08-06        Phosphorus Level, Serum: 3.9 mg/dL (08-06 @ 08:32)  Magnesium, Serum: 1.8 mg/dL (08-06 @ 08:32) NEPHROLOGY INTERVAL HPI/OVERNIGHT EVENTS:  UOP of 4-5 x   no diarrhea  tolerating po      MEDICATIONS  (STANDING):  ALBUTerol/ipratropium for Nebulization. 3 milliLiter(s) Nebulizer once  allopurinol 100 milliGRAM(s) Oral daily  aspirin  chewable 81 milliGRAM(s) Oral daily  buDESOnide   0.5 milliGRAM(s) Respule 0.5 milliGRAM(s) Inhalation two times a day  cholestyramine Powder (Sugar-Free) 4 Gram(s) Oral two times a day  diltiazem    Tablet 30 milliGRAM(s) Oral every 6 hours  doxazosin 8 milliGRAM(s) Oral at bedtime  epoetin nelly Injectable 71699 Unit(s) IV Push <User Schedule>  finasteride 5 milliGRAM(s) Oral daily  heparin  Injectable 5000 Unit(s) SubCutaneous every 12 hours  metoclopramide 5 milliGRAM(s) Oral three times a day  pantoprazole    Tablet 40 milliGRAM(s) Oral every 12 hours  simvastatin 10 milliGRAM(s) Oral at bedtime  sodium ferric gluconate complex Injectable 125 milliGRAM(s) IV Push <User Schedule>  vancomycin    Solution 500 milliGRAM(s) Oral every 6 hours    MEDICATIONS  (PRN):  acetaminophen   Tablet 650 milliGRAM(s) Oral every 8 hours PRN For Temp greater than 38 C (100.4 F)  acetaminophen   Tablet. 650 milliGRAM(s) Oral every 8 hours PRN Mild Pain (1 - 3)  albumin human 25% IVPB 50 milliLiter(s) IV Intermittent <User Schedule> PRN sbp<=120mmhg  ALBUTerol   0.5% 2.5 milliGRAM(s) Nebulizer every 4 hours PRN Shortness of Breath and/or Wheezing  diphenhydrAMINE   Capsule 25 milliGRAM(s) Oral at bedtime PRN sleep  fluticasone propionate 50 MICROgram(s)/spray Nasal Spray 1 Spray(s) Both Nostrils two times a day PRN runny nose  nystatin Powder 1 Application(s) Topical two times a day PRN fungal rash  ondansetron Injectable 4 milliGRAM(s) IV Push every 6 hours PRN Nausea and/or Vomiting      Allergies    Zosyn (Rash)    Intolerances        I&O's Detail      .    Patient was seen and evaluated on dialysis.   Patient is tolerating the procedure well.   Continue dialysis: seen at hd at 1:30pm  Dialyzer:  Revaclear with bfr of 350-400 on 2k with uf goal of 2kg   PRBC transfusion     Vital Signs Last 24 Hrs  T(C): 36.9 (06 Aug 2018 05:17), Max: 37.3 (05 Aug 2018 17:21)  T(F): 98.4 (06 Aug 2018 05:17), Max: 99.2 (05 Aug 2018 17:21)  HR: 68 (06 Aug 2018 11:00) (68 - 74)  BP: 164/32 (06 Aug 2018 05:17) (152/39 - 164/32)  BP(mean): --  RR: 18 (06 Aug 2018 05:17) (18 - 18)  SpO2: 95% (06 Aug 2018 05:17) (95% - 98%)  Daily     Daily     PHYSICAL EXAM:  General: alert. awake Ox3  HEENT: MMM  CV: s1s2 rrr  LUNGS: B/L CTA  EXT: no edema    LABS:                        7.3    5.01  )-----------( 185      ( 06 Aug 2018 08:32 )             24.1     08-06    142  |  110<H>  |  44<H>  ----------------------------<  75  4.1   |  23  |  4.09<H>    Ca    7.7<L>      06 Aug 2018 08:32  Phos  3.9     08-06  Mg     1.8     08-06    TPro  x   /  Alb  2.3<L>  /  TBili  x   /  DBili  x   /  AST  x   /  ALT  x   /  AlkPhos  x   08-06        Phosphorus Level, Serum: 3.9 mg/dL (08-06 @ 08:32)  Magnesium, Serum: 1.8 mg/dL (08-06 @ 08:32)

## 2018-08-07 LAB
ADD ON TEST-SPECIMEN IN LAB: SIGNIFICANT CHANGE UP
ANION GAP SERPL CALC-SCNC: 10 MMOL/L — SIGNIFICANT CHANGE UP (ref 5–17)
BUN SERPL-MCNC: 28 MG/DL — HIGH (ref 7–23)
CALCIUM SERPL-MCNC: 7.7 MG/DL — LOW (ref 8.5–10.1)
CHLORIDE SERPL-SCNC: 109 MMOL/L — HIGH (ref 96–108)
CO2 SERPL-SCNC: 26 MMOL/L — SIGNIFICANT CHANGE UP (ref 22–31)
CREAT SERPL-MCNC: 2.89 MG/DL — HIGH (ref 0.5–1.3)
GLUCOSE SERPL-MCNC: 81 MG/DL — SIGNIFICANT CHANGE UP (ref 70–99)
HCT VFR BLD CALC: 27.1 % — LOW (ref 39–50)
HGB BLD-MCNC: 8.3 G/DL — LOW (ref 13–17)
MAGNESIUM SERPL-MCNC: 1.7 MG/DL — SIGNIFICANT CHANGE UP (ref 1.6–2.6)
MCHC RBC-ENTMCNC: 29.7 PG — SIGNIFICANT CHANGE UP (ref 27–34)
MCHC RBC-ENTMCNC: 30.6 GM/DL — LOW (ref 32–36)
MCV RBC AUTO: 97.1 FL — SIGNIFICANT CHANGE UP (ref 80–100)
NRBC # BLD: 0 /100 WBCS — SIGNIFICANT CHANGE UP (ref 0–0)
PLATELET # BLD AUTO: 196 K/UL — SIGNIFICANT CHANGE UP (ref 150–400)
POTASSIUM SERPL-MCNC: 3.8 MMOL/L — SIGNIFICANT CHANGE UP (ref 3.5–5.3)
POTASSIUM SERPL-SCNC: 3.8 MMOL/L — SIGNIFICANT CHANGE UP (ref 3.5–5.3)
RBC # BLD: 2.79 M/UL — LOW (ref 4.2–5.8)
RBC # FLD: 18.9 % — HIGH (ref 10.3–14.5)
SODIUM SERPL-SCNC: 145 MMOL/L — SIGNIFICANT CHANGE UP (ref 135–145)
WBC # BLD: 4.05 K/UL — SIGNIFICANT CHANGE UP (ref 3.8–10.5)
WBC # FLD AUTO: 4.05 K/UL — SIGNIFICANT CHANGE UP (ref 3.8–10.5)

## 2018-08-07 RX ORDER — VANCOMYCIN HCL 1 G
500 VIAL (EA) INTRAVENOUS EVERY 8 HOURS
Qty: 0 | Refills: 0 | Status: DISCONTINUED | OUTPATIENT
Start: 2018-08-07 | End: 2018-08-09

## 2018-08-07 RX ADMIN — Medication 0.5 MILLIGRAM(S): at 20:05

## 2018-08-07 RX ADMIN — Medication 500 MILLIGRAM(S): at 05:54

## 2018-08-07 RX ADMIN — Medication 8 MILLIGRAM(S): at 21:04

## 2018-08-07 RX ADMIN — PANTOPRAZOLE SODIUM 40 MILLIGRAM(S): 20 TABLET, DELAYED RELEASE ORAL at 17:29

## 2018-08-07 RX ADMIN — HEPARIN SODIUM 5000 UNIT(S): 5000 INJECTION INTRAVENOUS; SUBCUTANEOUS at 05:54

## 2018-08-07 RX ADMIN — PANTOPRAZOLE SODIUM 40 MILLIGRAM(S): 20 TABLET, DELAYED RELEASE ORAL at 05:54

## 2018-08-07 RX ADMIN — Medication 5 MILLIGRAM(S): at 12:59

## 2018-08-07 RX ADMIN — Medication 5 MILLIGRAM(S): at 05:54

## 2018-08-07 RX ADMIN — Medication 100 MILLIGRAM(S): at 12:59

## 2018-08-07 RX ADMIN — Medication 500 MILLIGRAM(S): at 00:46

## 2018-08-07 RX ADMIN — Medication 0.5 MILLIGRAM(S): at 07:45

## 2018-08-07 RX ADMIN — FINASTERIDE 5 MILLIGRAM(S): 5 TABLET, FILM COATED ORAL at 12:59

## 2018-08-07 RX ADMIN — Medication 500 MILLIGRAM(S): at 12:59

## 2018-08-07 RX ADMIN — SIMVASTATIN 10 MILLIGRAM(S): 20 TABLET, FILM COATED ORAL at 21:04

## 2018-08-07 RX ADMIN — CHOLESTYRAMINE 4 GRAM(S): 4 POWDER, FOR SUSPENSION ORAL at 08:16

## 2018-08-07 RX ADMIN — HEPARIN SODIUM 5000 UNIT(S): 5000 INJECTION INTRAVENOUS; SUBCUTANEOUS at 17:29

## 2018-08-07 RX ADMIN — Medication 81 MILLIGRAM(S): at 12:59

## 2018-08-07 RX ADMIN — Medication 5 MILLIGRAM(S): at 21:04

## 2018-08-07 RX ADMIN — Medication 500 MILLIGRAM(S): at 17:29

## 2018-08-07 RX ADMIN — Medication 500 MILLIGRAM(S): at 21:04

## 2018-08-07 NOTE — PROGRESS NOTE ADULT - ASSESSMENT
c diff colitis  By mouth vancomycin, high dose   case discussed with  family  Patient with continued abdominal pain but improving and possible inc diarrhea, improving slowly      Patient had a recent course of C. difficile that was treated with vancomycin with a recurrence and he's had C. difficile in the past.  Due to severe C. difficile, would strongly consider a prolonged taper of vancomycin.  For now, would complete two-week course of vancomycin 500 qid  and then would taper her vancomycin over a long time.  Discussed with family.  Taper vancomycin  Vancomycin 500 mg 4 times a day, total of 2 weeks  Then 3 times a day for 2 weeks, then twice a day for one week, then daily for one week, then every other day for 1 week, then every 3rd day for 1 week.  Follow-up with me after that  Vanco taper eventually. However At this time, would continue high-dose vancomycin with Flagyl. Is improving slowly on it.  Encourage by mouth intake, encourage physical therapy    improving and DC planning in progress    DW hospitalist      BHUPENDRA fibrillation    COPD obesity, obstructive sleep apnea, coronary artery disease, overall comorbid risk factors     IVF per renal  HD as needed, but is making more urine and it is encouraging

## 2018-08-07 NOTE — PROGRESS NOTE ADULT - SUBJECTIVE AND OBJECTIVE BOX
NEPHROLOGY INTERVAL HPI/OVERNIGHT EVENTS:  8/7 SY  Post HD yesterday  Feeling fair.   Denies SOB.   Fair appetite.    MEDICATIONS  (STANDING):  ALBUTerol/ipratropium for Nebulization. 3 milliLiter(s) Nebulizer once  allopurinol 100 milliGRAM(s) Oral daily  aspirin  chewable 81 milliGRAM(s) Oral daily  buDESOnide   0.5 milliGRAM(s) Respule 0.5 milliGRAM(s) Inhalation two times a day  cholestyramine Powder (Sugar-Free) 4 Gram(s) Oral two times a day  diltiazem    Tablet 30 milliGRAM(s) Oral every 6 hours  doxazosin 8 milliGRAM(s) Oral at bedtime  epoetin nelly Injectable 68897 Unit(s) IV Push <User Schedule>  finasteride 5 milliGRAM(s) Oral daily  heparin  Injectable 5000 Unit(s) SubCutaneous every 12 hours  metoclopramide 5 milliGRAM(s) Oral three times a day  pantoprazole    Tablet 40 milliGRAM(s) Oral every 12 hours  simvastatin 10 milliGRAM(s) Oral at bedtime  sodium ferric gluconate complex Injectable 125 milliGRAM(s) IV Push <User Schedule>  vancomycin    Solution 500 milliGRAM(s) Oral every 6 hours    MEDICATIONS  (PRN):  acetaminophen   Tablet 650 milliGRAM(s) Oral every 8 hours PRN For Temp greater than 38 C (100.4 F)  acetaminophen   Tablet. 650 milliGRAM(s) Oral every 8 hours PRN Mild Pain (1 - 3)  albumin human 25% IVPB 50 milliLiter(s) IV Intermittent <User Schedule> PRN sbp<=120mmhg  ALBUTerol   0.5% 2.5 milliGRAM(s) Nebulizer every 4 hours PRN Shortness of Breath and/or Wheezing  diphenhydrAMINE   Capsule 25 milliGRAM(s) Oral at bedtime PRN sleep  fluticasone propionate 50 MICROgram(s)/spray Nasal Spray 1 Spray(s) Both Nostrils two times a day PRN runny nose  nystatin Powder 1 Application(s) Topical two times a day PRN fungal rash  ondansetron Injectable 4 milliGRAM(s) IV Push every 6 hours PRN Nausea and/or Vomiting          Vital Signs Last 24 Hrs  T(C): 36.7 (07 Aug 2018 12:05), Max: 37.1 (06 Aug 2018 13:09)  T(F): 98.1 (07 Aug 2018 12:05), Max: 98.8 (06 Aug 2018 13:09)  HR: 69 (07 Aug 2018 12:05) (63 - 89)  BP: 131/36 (07 Aug 2018 12:05) (112/42 - 163/50)  BP(mean): --  RR: 18 (07 Aug 2018 12:05) (18 - 18)  SpO2: 97% (07 Aug 2018 12:05) (96% - 98%)  Daily     Daily     08-06 @ 07:01  -  08-07 @ 07:00  --------------------------------------------------------  IN: 350 mL / OUT: 0 mL / NET: 350 mL      PHYSICAL EXAM:  Alert and comfortable  GENERAL: No distress  CHEST/LUNG:Clear to aus   HEART: S1s2 RRR  ABDOMEN: soft  EXTREMITIES: no edema  SKIN:     LABS:                        8.3    4.05  )-----------( 196      ( 07 Aug 2018 05:13 )             27.1     08-07    145  |  109<H>  |  28<H>  ----------------------------<  81  3.8   |  26  |  2.89<H>    Ca    7.7<L>      07 Aug 2018 05:13  Phos  3.9     08-06  Mg     1.7     08-07    TPro  x   /  Alb  2.3<L>  /  TBili  x   /  DBili  x   /  AST  x   /  ALT  x   /  AlkPhos  x   08-06        Magnesium, Serum: 1.7 mg/dL (08-07 @ 05:13)          RADIOLOGY & ADDITIONAL TESTS:

## 2018-08-07 NOTE — PROGRESS NOTE ADULT - ASSESSMENT
Pt is a 82 y/o M w/pmhx of Afib, CHF, CAD s/p stents, COPD, PNA, upper GI bleed (duodenal), PVD, s/p right lower extremity amputation,  recent hospitalization for pneumonia and subsequent Cdiff colitis admitted on 7/6 from assisted living for evaluation of persistent abdominal pain, fever and diarrhea that has been ongoing despite the po vancomycin course that the patient had been on. The patient also notes mid epigastric/chest pain. History per daughter at bedside and medical record as patient is poor historian.  1. Patient admitted with severe Cdiff pan colitis, also noted with leukocytosis most likely reactive to Cdiff infection  - follow up cultures   - iv hydration and supportive care   - serial cbc and monitor temperature   - back on vancomycin 500 mg po q 6 hours, day #15  - would start vancomycin taper as follows: vanco 500 mg po q 8 hours for one week, then po q 12 hours for one week, then q day for one week, then every other day for 2 weeks then discontinue  - discharge planning  - completed 10 days flagyl iv  - continue contact isolation  - limit antibiotics exposure  2. other issues: Afib, CHF, CAD s/p stents, COPD, PNA, upper GI bleed (duodenal), PVD, s/p right lower extremity amputation  - per medicine  3. Bladder wall thickening most likely due to the fact that patient now dialysis patient  - would avoid starting antibiotics in this patient with severe CDiff colitis  If further ID issues please reconsult   - monitor off antibiotics

## 2018-08-07 NOTE — PROGRESS NOTE ADULT - SUBJECTIVE AND OBJECTIVE BOX
CHIEF COMPLAINT: Diarrhea * 3weeks.     SUBJECTIVE:   HPI: This is a 82 y/o M w/pmhx of Afib, CHF, CAD s/p stents, COPD, recent PNA on abx, upper GI bleed (duodenal)  BIB EMS from [facility] for fever, abd pain, and diarrhea that began 3 weeks prior to admission. Was in the hospital for PNA tx and was later dx with C-diff and started on a course of PO vancomycin for 10 days for the C-diff. States that he has had diarrhea since before the course of abx. Pain has been increased for the past few weeks and is characterized as a cramping feeling. Daughters also note that there is increased distension. Also notes chest pain characterized as a cramping in the central chest. 83% O2 sat noted morning of admission on 7/6/18, at the [facility] living facility. Takes O2 routinely at night but not during the day.      8/7/18:   Pt was seen and examined at bedside. Pt still reports right lower quadrant abdominal cramping better today. Denies diarrhea overnight. No other acute complaints. Abdomen still moderately distended but no respiratory compromise. Tolerating po diet. Denies cp, sob, headaches, fever, chills.     REVIEW OF SYSTEMS:  CONSTITUTIONAL: No weakness, fevers or chills  EYES/ENT: No visual changes.  NECK: No pain or stiffness  RESPIRATORY: No cough, wheezing, hemoptysis; No shortness of breath  CARDIOVASCULAR: No chest pain or palpitations  GASTROINTESTINAL: + abdominal pain. No nausea, vomiting, or hematemesis; + diarrhea but better. No constipation. No melena or hematochezia.  GENITOURINARY: No dysuria, frequency or hematuria  NEUROLOGICAL: No numbness or weakness  SKIN: No itching, burning, rashes, or lesions   All other review of systems is negative unless indicated above    Vital Signs Last 24 Hrs  T(C): 36.7 (07 Aug 2018 12:05), Max: 37.1 (06 Aug 2018 13:09)  T(F): 98.1 (07 Aug 2018 12:05), Max: 98.8 (06 Aug 2018 13:09)  HR: 69 (07 Aug 2018 12:05) (63 - 89)  BP: 131/36 (07 Aug 2018 12:05) (112/42 - 163/50)  BP(mean): --  RR: 18 (07 Aug 2018 12:05) (18 - 18)  SpO2: 97% (07 Aug 2018 12:05) (96% - 98%)    I&O's Summary    06 Aug 2018 07:01  -  07 Aug 2018 07:00  --------------------------------------------------------  IN: 350 mL / OUT: 0 mL / NET: 350 mL        CAPILLARY BLOOD GLUCOSE          PHYSICAL EXAM:  Constitutional: Awake and alert.  HEENT: PERR, Normal Hearing.  Neck: Soft and supple, No JVD  Respiratory: CTAB  Cardiovascular: S1 and S2, RRR, No m/r/g.  Gastrointestinal: Bowel Sounds present, soft, mildly RLQ tenderness, moderately distended, + guarding, no rebound  Extremities: No peripheral edema  Vascular: 2+ peripheral pulses  Neurological: A/O x 3.    MEDICATIONS:  MEDICATIONS  (STANDING):  ALBUTerol/ipratropium for Nebulization. 3 milliLiter(s) Nebulizer once  allopurinol 100 milliGRAM(s) Oral daily  aspirin  chewable 81 milliGRAM(s) Oral daily  buDESOnide   0.5 milliGRAM(s) Respule 0.5 milliGRAM(s) Inhalation two times a day  cholestyramine Powder (Sugar-Free) 4 Gram(s) Oral two times a day  diltiazem    Tablet 30 milliGRAM(s) Oral every 6 hours  doxazosin 8 milliGRAM(s) Oral at bedtime  epoetin nelly Injectable 33095 Unit(s) IV Push <User Schedule>  finasteride 5 milliGRAM(s) Oral daily  heparin  Injectable 5000 Unit(s) SubCutaneous every 12 hours  metoclopramide 5 milliGRAM(s) Oral three times a day  pantoprazole    Tablet 40 milliGRAM(s) Oral every 12 hours  simvastatin 10 milliGRAM(s) Oral at bedtime  sodium ferric gluconate complex Injectable 125 milliGRAM(s) IV Push <User Schedule>  vancomycin    Solution 500 milliGRAM(s) Oral every 6 hours      LABS: All Labs Reviewed:                        8.3    4.05  )-----------( 196      ( 07 Aug 2018 05:13 )             27.1     08-07    145  |  109<H>  |  28<H>  ----------------------------<  81  3.8   |  26  |  2.89<H>    Ca    7.7<L>      07 Aug 2018 05:13  Phos  3.9     08-06  Mg     1.7     08-07    TPro  x   /  Alb  2.3<L>  /  TBili  x   /  DBili  x   /  AST  x   /  ALT  x   /  AlkPhos  x   08-06

## 2018-08-07 NOTE — PROGRESS NOTE ADULT - ASSESSMENT
83M w/ PMH of Afib, CHF, CAD s/p stents, COPD, recent PNA on abx, upper GI bleed (duodenal) was admitted for recurrent c. diff colitis. Currently stable. No stool production overnight. CXR shows pulmonary congestion and mild plural effusion. US shows small to moderate amount of abdominal ascites, greatest in the LLQ. S/p HD, transfusion and EPO yesterday (8/7/18). VSS.

## 2018-08-07 NOTE — PROGRESS NOTE ADULT - I WAS PHYSICALLY PRESENT FOR THE KEY PORTIONS OF THE EVALUATION AND MANAGEMENT (E/M) SERVICE PROVIDED.  I AGREE WITH THE ABOVE HISTORY, PHYSICAL, AND PLAN WHICH I HAVE REVIEWED AND EDITED WHERE APPROPRIATE
Statement Selected
Clothing

## 2018-08-07 NOTE — PROGRESS NOTE ADULT - ASSESSMENT
84 y/o wm with hx of ckd stage 3 ( scr 1.5-1.7) with ARYA due to volume depletion from CDiff associated colitis and diarrhea in presence of diuretic use PTA.  Now with non anion gap metabolic acidosis and worsening renal parameters.  CXR with mild CHF with hx of diastolic CHF.    PLAN  - obtain abd and lactate  - will change ivf and add bicarbonate and keep at current rate  - hold lasix done.   - fu uop and scr closely and will monitor respiratory status  - bp and hr stable now, cardizem adjusted and lopressor added    7/9 MK  - ARYA: worsening renal parameters with metabolic acidosis, non ag.  Previous lactate WNL and since placed on bicarbonate drip  fu repeat abg today.  Increase IVF rate  possibility of HD discussed with enio, hcp daughter, pt has been agreeable in past.   DC hydralazine  dc morphine and change to dilaudid  - Cdiff with rising scr and worsening abd distension: CRS consult ongoing  possible repeat ct scan  DW Dr ballesteros    7/10  Anuric  anasarca  Non anion gap acidosis on bicarb drip  ARYA, anuric  CKD 3 history  d/w Family, and pt agreeable  called ICU for line placement and to dialyze the pt in ICU for monitoring    7/10  HD initiated via R temp HD catheter  tolerating well  no complaints  good abf    7/11 SY  --ARYA with Anasarca.  Urine output slightly improved.  However, remains quite fluid overloaded.  Will plan to continue HD until fluid status is much improved.  HD order written.  3 hour tx today.  Will aim to remove 2.5 kg as tolerated.  --C DIff colitis ; continue to monitor closely.    7/12 SY  --ARYA with Anasarca.   Remains Oligo-anuric.    --HD with goal of 2.5 kg UF again today.  --C Diff colitis.   Clinically improved   Continue to monitor.    7/13  The patient was dialyzed 3 days in a row this week  Discussed with the patient's daughter and hospitalist, intensivist  Will skip dialysis today, no urgent need for dialysis  We'll dialyze the patient tomorrow on Saturday and then skip Sunday to dialyze the patient again on Monday.    7/14  We'll dialyze against 4 K bath  Case discussed with the patient's daughter  May need a permanent catheter by Monday      7/15  Dialyze with a 4K bath  Potassium is 3.3 most likely from the diarrhea  Schedule for permacath placement after dialysis on Monday or Tuesday  Patient is comfortable no complications noted at this time    7/16 MK  - ARYA/ CKD stage 3 remains oliguirc and HD dependent.  LImited UF at HD toleated with the   hypokalemia corrected with HD.  Permcath planned today  - Cdiff: on PO vanc.  improved leukocytosis and abd distension     7/17  s/p perm cath  HD tomorrow  4 K bath  replace K   overall stable    7/18 SY  --ARYA/CKD for now remaining dialysis dependent.  Continue TIW HD.  --C Diff colitis ; improving clinically.    7/19 SY  --ARYA/CKD : Continue TIW HD.   Will continue as outpatient for now as no signs of recovery at this point.  --C Diff colitis  : clinically improving.  Continue po abtx.  --D/c Planning  D/w pt's daughter re : rehab with HD.  --Replete K.  Continue regular diet without restrictions for now.    7/20 MK  - ARYA/CKD remains HD dependent with oliguria.  Will attempt UF with HD.  K correction with HD and changed to regular diet with fluid restriction  - Cdiff: PO vanc with improving renal paramenters-  - Episodes of Desat: will have pulmonary re-eval prior to dc.  - DC planning rehab with dialysis  dw Dr Andrade  will see pt    7/21 MK  - ARYA/CKD remains hd dependent. post hd cxr with improved PVC.  Still with episodes of desaturation and dr andrade input noted.  CT with evidence of ascites for paracentesis  - cdiff: on po vanc.  still with loose stools,     7/22 MK  - ARYA/CKD remains HD dependent.  Will coordinate AM HD based upon timing of paracentesis, hypokalemia improved  - hypoxia with ascites: for paracentesis   - cdif on po vanc, rectal tube in place    7/23 MK  - ARYA/CKD remains HD dependent, toelrating hd.  + inc uop   - hypoxia with ascites: s/p paracentesis today, monitor response to oxygenation  - AOCD; fu trend of h/h, on epogen  - cdiff: po vanc    7/24 SY  --ARYA/CKD  Urine output may be increasing.  Follow creat trend to determine further dialysis support.  --Post Paracentesis : improve resp status.  --Anemia : continue LETICIA.  --C Diff : continue po Vanco.    7/25 SY  --ARYA/CKD  Monitor urine output.  Noted with increased creat.  Will maintain on TIW HD since fluid overloaded state is persistent.  --Monitor GI function.  --Post Paracentesis : resp status stabilized.  --Anemia :continue LETICIA.    7/26 SY  --ARYA/CKD  : Creat slowly  trending down.  Urine output not recorded.  Check creat in am to determine whether HD can be held off.  --Recurrent abdominal pain of concern.  Being assessed by primary team and GI.  --Bladder scan to assure full void.    7/27 MK  - ARYA/CKD: with + inc Uop and significant volume depletion overnight.  Bladder scan of 77 ml with stable respiratory status.  NO Hd today and reassess in the AM.      7/28 SY  --ARYA /CKD : positive increase in urine output. However, renal parameters remain elevated.  Therefore, will maintain on TIW HD schedule for now.  --Pt's fluid status has much improved.   Will HD today with no UF and allow increased fluid intake without restrictions.  --C DIff : continue to monitor and continue po abtx.    7/29 SY  --ARYA/CKD   post HD yesterday.   Continue to monitor for signs of renal recovery.  --Fluid overloaded state much improved.  --Monitor on regular diet.  --C Diff : continue po abtx.    7/30 MK  - ARYA/CKD, with stable renal parameters Unclear if with increasing UOP.  Will hold off on HD tomorrow and assess in AM  - Cloudy urine with white sputum production: fu with ua and urine cultures    7/31  minimal cloudy urine   creat up to 4.88  Abd distended \examined w Dr zendejas  Will dialyze today for fluid removal as tolerated, 4 K bath  May need paracentesis as per Dr Zendejas    8/1 MK  - ARYA/CKD will assess in am need for HD  - cdiff; on po vanc/iv flagyll    8/2  HD in am 8/3  t/c for possible HD if drops further  feels better overall    8/3  the patient is seen on dialysis.  Hemoglobin stable at 7.9  Increase erythropoietin to 10,000 units each dialysis    8/4  Tolerating regular diet  Out of bed  Feels well  Status post hemodialysis yesterday  Next dialysis is scheduled for Monday  From a renal standpoint may be discharged early next week.  We'll need to arrange outpatient dialysis    8/5 MK  - ARYA/CKD remains HD dependent,  HD in am and will fu the labs send  fu UOP and DC planning in progress  - Cdif  on po vanc and flagyll  - cloudy urine with clear sputum production: monitor    8/6 MK  - ARYA/CKD with possible inc UOP. , Hd today with transfusion in anticipation of transfer to rehab today/tomorrow  - Cdif on po vanc and flagyl  - anemia: transfusion and epo with hd     8/7 SY  --ARYA/CKD   Continue HD until significant renal recovery due to propensity for CHF and fluid overloaded state.  --C Diff colitis ; improving.  --D/c planning.

## 2018-08-07 NOTE — PROGRESS NOTE ADULT - PROBLEM SELECTOR PLAN 3
- s/p Tunnel Cath on 7/16/18, patient currently HD dependent  - Nephro consult appreciated: cont HD, Creatinine worsened past couple days   - S/p Tranfusion and EPO with HD yesterday (8/7/18)

## 2018-08-07 NOTE — PROGRESS NOTE ADULT - SUBJECTIVE AND OBJECTIVE BOX
Patient is a 83y old  Male who presents with a chief complaint of complain of diarrhea, fever (06 Jul 2018 23:55)  Date of service: 08-07-18 @ 17:55      Patient bowel movements are more formed  Afebrile, minimal abdominal discomfort      ROS: no fever or chills; denies dizziness, no HA, no SOB or cough, no abdominal pain, no diarrhea or constipation; no dysuria, no urinary frequency, no legs pain, no rashes    MEDICATIONS  (STANDING):  ALBUTerol/ipratropium for Nebulization. 3 milliLiter(s) Nebulizer once  allopurinol 100 milliGRAM(s) Oral daily  aspirin  chewable 81 milliGRAM(s) Oral daily  buDESOnide   0.5 milliGRAM(s) Respule 0.5 milliGRAM(s) Inhalation two times a day  cholestyramine Powder (Sugar-Free) 4 Gram(s) Oral two times a day  diltiazem    Tablet 30 milliGRAM(s) Oral every 6 hours  doxazosin 8 milliGRAM(s) Oral at bedtime  epoetin nelly Injectable 21322 Unit(s) IV Push <User Schedule>  finasteride 5 milliGRAM(s) Oral daily  heparin  Injectable 5000 Unit(s) SubCutaneous every 12 hours  metoclopramide 5 milliGRAM(s) Oral three times a day  pantoprazole    Tablet 40 milliGRAM(s) Oral every 12 hours  simvastatin 10 milliGRAM(s) Oral at bedtime  sodium ferric gluconate complex Injectable 125 milliGRAM(s) IV Push <User Schedule>  vancomycin    Solution 500 milliGRAM(s) Oral every 6 hours    MEDICATIONS  (PRN):  acetaminophen   Tablet 650 milliGRAM(s) Oral every 8 hours PRN For Temp greater than 38 C (100.4 F)  acetaminophen   Tablet. 650 milliGRAM(s) Oral every 8 hours PRN Mild Pain (1 - 3)  albumin human 25% IVPB 50 milliLiter(s) IV Intermittent <User Schedule> PRN sbp<=120mmhg  ALBUTerol   0.5% 2.5 milliGRAM(s) Nebulizer every 4 hours PRN Shortness of Breath and/or Wheezing  diphenhydrAMINE   Capsule 25 milliGRAM(s) Oral at bedtime PRN sleep  fluticasone propionate 50 MICROgram(s)/spray Nasal Spray 1 Spray(s) Both Nostrils two times a day PRN runny nose  nystatin Powder 1 Application(s) Topical two times a day PRN fungal rash  ondansetron Injectable 4 milliGRAM(s) IV Push every 6 hours PRN Nausea and/or Vomiting      Vital Signs Last 24 Hrs  T(C): 36.7 (07 Aug 2018 17:19), Max: 37.1 (07 Aug 2018 00:40)  T(F): 98.1 (07 Aug 2018 17:19), Max: 98.7 (07 Aug 2018 00:40)  HR: 72 (07 Aug 2018 17:19) (64 - 89)  BP: 142/39 (07 Aug 2018 17:19) (131/36 - 161/39)  BP(mean): --  RR: 18 (07 Aug 2018 17:19) (18 - 18)  SpO2: 99% (07 Aug 2018 17:19) (96% - 99%)    Physical Exam:    PE:    Constitutional: frail looking  HEENT: NC/AT, EOMI, PERRLA, conjunctivae clear; ears and nose atraumatic; pharynx clear  Neck: supple; thyroid not palpable  Respiratory: respiratory effort normal; clear to auscultation  Cardiovascular: S1S2 regular, no murmurs  Abdomen: soft, right lower quadrant less tender, difusely distended, positive BS; no liver or spleen organomegaly  Genitourinary: no suprapubic tenderness  Musculoskeletal: s/p right lower extremity amputation; left lower extremity with dressing in place  Neurological/ Psychiatric: AxOx3, judgement and insight normal;  moving all extremities  Skin: no rashes; no palpable lesions    Labs: all available labs reviewed                        Labs:           Labs:                        8.3    4.05  )-----------( 196      ( 07 Aug 2018 05:13 )             27.1     08-07    145  |  109<H>  |  28<H>  ----------------------------<  81  3.8   |  26  |  2.89<H>    Ca    7.7<L>      07 Aug 2018 05:13  Phos  3.9     08-06  Mg     1.7     08-07    TPro  x   /  Alb  2.3<L>  /  TBili  x   /  DBili  x   /  AST  x   /  ALT  x   /  AlkPhos  x   08-06           Cultures:         Clostridium difficile Toxin by PCR (07.06.18 @ 16:19)    C Diff by PCR Result: Detected    Clostridium difficile Toxin by PCR: The results of this test should be interpreted with consideration of all  clinical and laboratory findings. This test determines the presence of  the C. difficile tcdB gene at a given time and is not intended to  identify antibiotic associated disease or C. difficile infection without  clinical context. Successful treatment is based on the resolution of  clinical symptoms. This test should not be used as a "test of cure"  because C. difficile DNA will persist after successful treatment. Repeat  testing will not be permitted.    This test is performed on the BD MAX system using Real-Time PCR and  fluorogenic target-specific hybridization.        < from: CT Abdomen and Pelvis w/ Oral Cont (07.06.18 @ 18:03) >    IMPRESSION:     Severe pancolitis consistent with pseudomembranous colitis.    Mild pulmonary edema.    Small loculated right and trace layering left pleural effusions.      < end of copied text >    < from: CT Abdomen and Pelvis No Cont (07.21.18 @ 11:15) >    IMPRESSION:     Stable appearance of pancolitis consistent with pseudomembranous colitis.   No pneumatosis or free air.    Increasing moderate ascites.    Anasarca.    Urinary bladder cystitis. Correlate with UA.      < end of copied text >        Radiology: all available radiological tests reviewed    Advanced directives addressed: full resuscitation

## 2018-08-07 NOTE — PROGRESS NOTE ADULT - SUBJECTIVE AND OBJECTIVE BOX
Patient is a 83y old  Male who presents with a chief complaint of Fever/Diarrhea (20 Jul 2018 13:30)      HPI:  Pt is a 82 y/o M w/pmhx of Afib, CHF, CAD s/p stents, COPD, PNA, upper GI bleed (duodenal)  BIB EMS from [facility] assisted living for fever, abd pain, and diarrhea that began 3 weeks ago. Was in the hospital for PNA tx and was later dx with C-diff and started on a course of PO vancomycin for 10 days for the C-diff. States that he has had diarrhea since before the course of abx. Pain has been increased for the past few weeks and is characterized as a cramping feeling. Daughters also note that there is increased distension. Also notes chest pain characterized as a cramping in the central chest. 83% O2 sat noted this morning at the assisted living facility. Takes O2 routinely at night but not during the day. (06 Jul 2018 23:55)    Patient for comfortable. Had 3 or 4 bowel movements yesterday which were more formed. Increased urine production. Ascites is decreasing.  Feels a bit stronger but is still is fatigued.  less abd crampy pain      PAST MEDICAL & SURGICAL HISTORY:  Diastolic CHF  Peripheral vascular disease  Afib  Anemia  CKD (chronic kidney disease)  COPD (chronic obstructive pulmonary disease)  SHAYY (obstructive sleep apnea)  Sepsis, due to unspecified organism: 2/2 poorly healing wounds b/l  Dyspepsia: On moderate exertion.  Sleep apnea, obstructive: Requires home 02 therapy, and treatment with BIPAP  Atelectasis  Pleural effusion, bilateral  Respiratory failure  Peripheral edema  CRI (chronic renal insufficiency)  Gout  Benign prostatic hypertrophy  Spinal stenosis  Hypercholesterolemia  GERD (gastroesophageal reflux disease)  CAD (coronary artery disease)  Hypertension  S/P angioplasty with stent  Cataract of left eye  Prostate: Surgery green light procedure.  S/P rotator cuff surgery: Right  S/P angioplasty      MEDICATIONS  (STANDING):  ALBUTerol/ipratropium for Nebulization. 3 milliLiter(s) Nebulizer once  allopurinol 100 milliGRAM(s) Oral daily  aspirin  chewable 81 milliGRAM(s) Oral daily  buDESOnide   0.5 milliGRAM(s) Respule 0.5 milliGRAM(s) Inhalation two times a day  cholestyramine Powder (Sugar-Free) 4 Gram(s) Oral two times a day  diltiazem    Tablet 30 milliGRAM(s) Oral every 6 hours  doxazosin 8 milliGRAM(s) Oral at bedtime  epoetin nelly Injectable 75111 Unit(s) IV Push <User Schedule>  finasteride 5 milliGRAM(s) Oral daily  heparin  Injectable 5000 Unit(s) SubCutaneous every 12 hours  metoclopramide 5 milliGRAM(s) Oral three times a day  pantoprazole    Tablet 40 milliGRAM(s) Oral every 12 hours  simvastatin 10 milliGRAM(s) Oral at bedtime  sodium ferric gluconate complex Injectable 125 milliGRAM(s) IV Push <User Schedule>  vancomycin    Solution 500 milliGRAM(s) Oral every 6 hours    MEDICATIONS  (PRN):  acetaminophen   Tablet 650 milliGRAM(s) Oral every 8 hours PRN For Temp greater than 38 C (100.4 F)  acetaminophen   Tablet. 650 milliGRAM(s) Oral every 8 hours PRN Mild Pain (1 - 3)  albumin human 25% IVPB 50 milliLiter(s) IV Intermittent <User Schedule> PRN sbp<=120mmhg  ALBUTerol   0.5% 2.5 milliGRAM(s) Nebulizer every 4 hours PRN Shortness of Breath and/or Wheezing  diphenhydrAMINE   Capsule 25 milliGRAM(s) Oral at bedtime PRN sleep  fluticasone propionate 50 MICROgram(s)/spray Nasal Spray 1 Spray(s) Both Nostrils two times a day PRN runny nose  nystatin Powder 1 Application(s) Topical two times a day PRN fungal rash  ondansetron Injectable 4 milliGRAM(s) IV Push every 6 hours PRN Nausea and/or Vomiting      Allergies    Zosyn (Rash)    Intolerances        SOCIAL HISTORY:NC    FAMILY HISTORY:  No pertinent family history in first degree relatives      REVIEW OF SYSTEMS:    CONSTITUTIONAL: No weakness, fevers or chills  EYES/ENT: No visual changes;  No vertigo or throat pain   NECK: No pain or stiffness  RESPIRATORY: No cough, wheezing, hemoptysis; No shortness of breath  CARDIOVASCULAR: No chest pain or palpitations  GENITOURINARY: No dysuria, frequency or hematuria  NEUROLOGICAL: No numbness or weakness  SKIN: No itching, burning, rashes, or lesions   All other review of systems is negative unless indicated above.    Vital Signs Last 24 Hrs  T(C): 36.8 (07 Aug 2018 05:31), Max: 37.1 (06 Aug 2018 13:09)  T(F): 98.2 (07 Aug 2018 05:31), Max: 98.8 (06 Aug 2018 13:09)  HR: 64 (07 Aug 2018 05:31) (63 - 89)  BP: 154/37 (07 Aug 2018 05:31) (112/42 - 163/50)  BP(mean): --  RR: 18 (07 Aug 2018 05:31) (18 - 18)  SpO2: 97% (07 Aug 2018 05:31) (96% - 98%)    PHYSICAL EXAM:    Constitutional: NAD, well-developed  HEENT: EOMI, throat clear  Neck: No LAD, supple  Respiratory: CTA and P  Cardiovascular: S1 and S2, RRR, no M  Gastrointestinal: BS+, soft, mild lower tender/ND, neg HSM,dec ascites  Extremities: No peripheral edema, neg clubing, cyanosis    Neurological: A/O x 3, no focal deficits  Psychiatric: Normal mood, normal affect  Skin: No rashes    LABS:  CBC Full  -  ( 07 Aug 2018 05:13 )  WBC Count : 4.05 K/uL  Hemoglobin : 8.3 g/dL  Hematocrit : 27.1 %  Platelet Count - Automated : 196 K/uL  Mean Cell Volume : 97.1 fl  Mean Cell Hemoglobin : 29.7 pg  Mean Cell Hemoglobin Concentration : 30.6 gm/dL  Auto Neutrophil # : x  Auto Lymphocyte # : x  Auto Monocyte # : x  Auto Eosinophil # : x  Auto Basophil # : x  Auto Neutrophil % : x  Auto Lymphocyte % : x  Auto Monocyte % : x  Auto Eosinophil % : x  Auto Basophil % : x    08-06    142  |  110<H>  |  44<H>  ----------------------------<  75  4.1   |  23  |  4.09<H>    Ca    7.7<L>      06 Aug 2018 08:32  Phos  3.9     08-06  Mg     1.7     08-07    TPro  x   /  Alb  2.3<L>  /  TBili  x   /  DBili  x   /  AST  x   /  ALT  x   /  AlkPhos  x   08-06            RADIOLOGY & ADDITIONAL STUDIES:

## 2018-08-07 NOTE — PROGRESS NOTE ADULT - PROBLEM SELECTOR PLAN 1
- Vancomycin po.  - S/p flagyl  mg q8h (d/c 8/3/18 by ID)  -ID recs: limit abx exposure, cont current abx regimen, contact isolation, do not add new abx for cystitis  -GI recs: cont current abx for now. Hold a/c due to risk of bleeding.  -PT evals today.  - D/C planning for today 7/7/18 pending Rehab placement by [facility].

## 2018-08-08 LAB
ANION GAP SERPL CALC-SCNC: 10 MMOL/L — SIGNIFICANT CHANGE UP (ref 5–17)
BUN SERPL-MCNC: 35 MG/DL — HIGH (ref 7–23)
CALCIUM SERPL-MCNC: 7.9 MG/DL — LOW (ref 8.5–10.1)
CHLORIDE SERPL-SCNC: 110 MMOL/L — HIGH (ref 96–108)
CO2 SERPL-SCNC: 25 MMOL/L — SIGNIFICANT CHANGE UP (ref 22–31)
CREAT SERPL-MCNC: 3.47 MG/DL — HIGH (ref 0.5–1.3)
GLUCOSE SERPL-MCNC: 87 MG/DL — SIGNIFICANT CHANGE UP (ref 70–99)
HAV IGM SER-ACNC: SIGNIFICANT CHANGE UP
HBV CORE IGM SER-ACNC: SIGNIFICANT CHANGE UP
HBV SURFACE AG SER-ACNC: SIGNIFICANT CHANGE UP
HCT VFR BLD CALC: 27.4 % — LOW (ref 39–50)
HCV AB S/CO SERPL IA: 0.09 S/CO — SIGNIFICANT CHANGE UP
HCV AB SERPL-IMP: SIGNIFICANT CHANGE UP
HGB BLD-MCNC: 8.4 G/DL — LOW (ref 13–17)
MCHC RBC-ENTMCNC: 29.9 PG — SIGNIFICANT CHANGE UP (ref 27–34)
MCHC RBC-ENTMCNC: 30.7 GM/DL — LOW (ref 32–36)
MCV RBC AUTO: 97.5 FL — SIGNIFICANT CHANGE UP (ref 80–100)
NRBC # BLD: 0 /100 WBCS — SIGNIFICANT CHANGE UP (ref 0–0)
PLATELET # BLD AUTO: 185 K/UL — SIGNIFICANT CHANGE UP (ref 150–400)
POTASSIUM SERPL-MCNC: 3.7 MMOL/L — SIGNIFICANT CHANGE UP (ref 3.5–5.3)
POTASSIUM SERPL-SCNC: 3.7 MMOL/L — SIGNIFICANT CHANGE UP (ref 3.5–5.3)
RBC # BLD: 2.81 M/UL — LOW (ref 4.2–5.8)
RBC # FLD: 18.6 % — HIGH (ref 10.3–14.5)
SODIUM SERPL-SCNC: 145 MMOL/L — SIGNIFICANT CHANGE UP (ref 135–145)
WBC # BLD: 4.31 K/UL — SIGNIFICANT CHANGE UP (ref 3.8–10.5)
WBC # FLD AUTO: 4.31 K/UL — SIGNIFICANT CHANGE UP (ref 3.8–10.5)

## 2018-08-08 RX ADMIN — Medication 500 MILLIGRAM(S): at 05:48

## 2018-08-08 RX ADMIN — Medication 0.5 MILLIGRAM(S): at 20:21

## 2018-08-08 RX ADMIN — Medication 0.5 MILLIGRAM(S): at 08:14

## 2018-08-08 RX ADMIN — Medication 500 MILLIGRAM(S): at 13:00

## 2018-08-08 RX ADMIN — CHOLESTYRAMINE 4 GRAM(S): 4 POWDER, FOR SUSPENSION ORAL at 21:08

## 2018-08-08 RX ADMIN — CHOLESTYRAMINE 4 GRAM(S): 4 POWDER, FOR SUSPENSION ORAL at 09:40

## 2018-08-08 RX ADMIN — Medication 500 MILLIGRAM(S): at 21:08

## 2018-08-08 RX ADMIN — Medication 81 MILLIGRAM(S): at 11:18

## 2018-08-08 RX ADMIN — Medication 8 MILLIGRAM(S): at 21:07

## 2018-08-08 RX ADMIN — HEPARIN SODIUM 5000 UNIT(S): 5000 INJECTION INTRAVENOUS; SUBCUTANEOUS at 17:24

## 2018-08-08 RX ADMIN — HEPARIN SODIUM 5000 UNIT(S): 5000 INJECTION INTRAVENOUS; SUBCUTANEOUS at 05:47

## 2018-08-08 RX ADMIN — Medication 5 MILLIGRAM(S): at 13:00

## 2018-08-08 RX ADMIN — Medication 100 MILLIGRAM(S): at 11:18

## 2018-08-08 RX ADMIN — FINASTERIDE 5 MILLIGRAM(S): 5 TABLET, FILM COATED ORAL at 11:18

## 2018-08-08 RX ADMIN — SIMVASTATIN 10 MILLIGRAM(S): 20 TABLET, FILM COATED ORAL at 21:07

## 2018-08-08 RX ADMIN — PANTOPRAZOLE SODIUM 40 MILLIGRAM(S): 20 TABLET, DELAYED RELEASE ORAL at 05:47

## 2018-08-08 RX ADMIN — PANTOPRAZOLE SODIUM 40 MILLIGRAM(S): 20 TABLET, DELAYED RELEASE ORAL at 17:23

## 2018-08-08 RX ADMIN — Medication 5 MILLIGRAM(S): at 05:47

## 2018-08-08 RX ADMIN — Medication 5 MILLIGRAM(S): at 21:07

## 2018-08-08 NOTE — PROGRESS NOTE ADULT - ASSESSMENT
82 y/o wm with hx of ckd stage 3 ( scr 1.5-1.7) with ARYA due to volume depletion from CDiff associated colitis and diarrhea in presence of diuretic use PTA.  Now with non anion gap metabolic acidosis and worsening renal parameters.  CXR with mild CHF with hx of diastolic CHF.    PLAN  - obtain abd and lactate  - will change ivf and add bicarbonate and keep at current rate  - hold lasix done.   - fu uop and scr closely and will monitor respiratory status  - bp and hr stable now, cardizem adjusted and lopressor added    7/9 MK  - ARYA: worsening renal parameters with metabolic acidosis, non ag.  Previous lactate WNL and since placed on bicarbonate drip  fu repeat abg today.  Increase IVF rate  possibility of HD discussed with enio, hcp daughter, pt has been agreeable in past.   DC hydralazine  dc morphine and change to dilaudid  - Cdiff with rising scr and worsening abd distension: CRS consult ongoing  possible repeat ct scan  DW Dr ballesteros    7/10  Anuric  anasarca  Non anion gap acidosis on bicarb drip  ARYA, anuric  CKD 3 history  d/w Family, and pt agreeable  called ICU for line placement and to dialyze the pt in ICU for monitoring    7/10  HD initiated via R temp HD catheter  tolerating well  no complaints  good abf    7/11 SY  --ARYA with Anasarca.  Urine output slightly improved.  However, remains quite fluid overloaded.  Will plan to continue HD until fluid status is much improved.  HD order written.  3 hour tx today.  Will aim to remove 2.5 kg as tolerated.  --C DIff colitis ; continue to monitor closely.    7/12 SY  --ARYA with Anasarca.   Remains Oligo-anuric.    --HD with goal of 2.5 kg UF again today.  --C Diff colitis.   Clinically improved   Continue to monitor.    7/13  The patient was dialyzed 3 days in a row this week  Discussed with the patient's daughter and hospitalist, intensivist  Will skip dialysis today, no urgent need for dialysis  We'll dialyze the patient tomorrow on Saturday and then skip Sunday to dialyze the patient again on Monday.    7/14  We'll dialyze against 4 K bath  Case discussed with the patient's daughter  May need a permanent catheter by Monday      7/15  Dialyze with a 4K bath  Potassium is 3.3 most likely from the diarrhea  Schedule for permacath placement after dialysis on Monday or Tuesday  Patient is comfortable no complications noted at this time    7/16 MK  - ARYA/ CKD stage 3 remains oliguirc and HD dependent.  LImited UF at HD toleated with the   hypokalemia corrected with HD.  Permcath planned today  - Cdiff: on PO vanc.  improved leukocytosis and abd distension     7/17  s/p perm cath  HD tomorrow  4 K bath  replace K   overall stable    7/18 SY  --ARYA/CKD for now remaining dialysis dependent.  Continue TIW HD.  --C Diff colitis ; improving clinically.    7/19 SY  --ARYA/CKD : Continue TIW HD.   Will continue as outpatient for now as no signs of recovery at this point.  --C Diff colitis  : clinically improving.  Continue po abtx.  --D/c Planning  D/w pt's daughter re : rehab with HD.  --Replete K.  Continue regular diet without restrictions for now.    7/20 MK  - ARYA/CKD remains HD dependent with oliguria.  Will attempt UF with HD.  K correction with HD and changed to regular diet with fluid restriction  - Cdiff: PO vanc with improving renal paramenters-  - Episodes of Desat: will have pulmonary re-eval prior to dc.  - DC planning rehab with dialysis  dw Dr Andrade  will see pt    7/21 MK  - ARYA/CKD remains hd dependent. post hd cxr with improved PVC.  Still with episodes of desaturation and dr andrade input noted.  CT with evidence of ascites for paracentesis  - cdiff: on po vanc.  still with loose stools,     7/22 MK  - ARYA/CKD remains HD dependent.  Will coordinate AM HD based upon timing of paracentesis, hypokalemia improved  - hypoxia with ascites: for paracentesis   - cdif on po vanc, rectal tube in place    7/23 MK  - ARYA/CKD remains HD dependent, toelrating hd.  + inc uop   - hypoxia with ascites: s/p paracentesis today, monitor response to oxygenation  - AOCD; fu trend of h/h, on epogen  - cdiff: po vanc    7/24 SY  --ARYA/CKD  Urine output may be increasing.  Follow creat trend to determine further dialysis support.  --Post Paracentesis : improve resp status.  --Anemia : continue LETICIA.  --C Diff : continue po Vanco.    7/25 SY  --ARYA/CKD  Monitor urine output.  Noted with increased creat.  Will maintain on TIW HD since fluid overloaded state is persistent.  --Monitor GI function.  --Post Paracentesis : resp status stabilized.  --Anemia :continue LETICIA.    7/26 SY  --ARYA/CKD  : Creat slowly  trending down.  Urine output not recorded.  Check creat in am to determine whether HD can be held off.  --Recurrent abdominal pain of concern.  Being assessed by primary team and GI.  --Bladder scan to assure full void.    7/27 MK  - ARYA/CKD: with + inc Uop and significant volume depletion overnight.  Bladder scan of 77 ml with stable respiratory status.  NO Hd today and reassess in the AM.      7/28 SY  --ARYA /CKD : positive increase in urine output. However, renal parameters remain elevated.  Therefore, will maintain on TIW HD schedule for now.  --Pt's fluid status has much improved.   Will HD today with no UF and allow increased fluid intake without restrictions.  --C DIff : continue to monitor and continue po abtx.    7/29 SY  --ARYA/CKD   post HD yesterday.   Continue to monitor for signs of renal recovery.  --Fluid overloaded state much improved.  --Monitor on regular diet.  --C Diff : continue po abtx.    7/30 MK  - RAYA/CKD, with stable renal parameters Unclear if with increasing UOP.  Will hold off on HD tomorrow and assess in AM  - Cloudy urine with white sputum production: fu with ua and urine cultures    7/31  minimal cloudy urine   creat up to 4.88  Abd distended \examined w Dr zendejas  Will dialyze today for fluid removal as tolerated, 4 K bath  May need paracentesis as per Dr Zendejas    8/1 MK  - ARYA/CKD will assess in am need for HD  - cdiff; on po vanc/iv flagyll    8/2  HD in am 8/3  t/c for possible HD if drops further  feels better overall    8/3  the patient is seen on dialysis.  Hemoglobin stable at 7.9  Increase erythropoietin to 10,000 units each dialysis    8/4  Tolerating regular diet  Out of bed  Feels well  Status post hemodialysis yesterday  Next dialysis is scheduled for Monday  From a renal standpoint may be discharged early next week.  We'll need to arrange outpatient dialysis    8/5 MK  - ARYA/CKD remains HD dependent,  HD in am and will fu the labs send  fu UOP and DC planning in progress  - Cdif  on po vanc and flagyll  - cloudy urine with clear sputum production: monitor    8/6 MK  - ARYA/CKD with possible inc UOP. , Hd today with transfusion in anticipation of transfer to rehab today/tomorrow  - Cdif on po vanc and flagyl  - anemia: transfusion and epo with hd     8/7 SY  --ARYA/CKD   Continue HD until significant renal recovery due to propensity for CHF and fluid overloaded state.  --C Diff colitis ; improving.  --D/c planning.    8/8 SY  --ARYA/CKD : creat increasing interdialytic period.   Noted for slight increase in urine output.  Will monitor closely and plan to continue BIW HD.  --C DIff Colitis : continue po vanco taper.  --D/c planning.

## 2018-08-08 NOTE — PROGRESS NOTE ADULT - ASSESSMENT
83M w/ PMH of Afib, CHF, CAD s/p stents, COPD, recent PNA on abx, upper GI bleed (duodenal) was admitted for recurrent c. diff colitis. Currently stable. Minimal stool production overnight. CXR shows pulmonary congestion and mild plural effusion. US shows small to moderate amount of abdominal ascites, greatest in the LLQ. S/p HD, transfusion and EPO (8/7/18). VSS.

## 2018-08-08 NOTE — PROGRESS NOTE ADULT - PROBLEM SELECTOR PLAN 1
- Vancomycin po with prolong taper. Instructions in D/C document  - S/p flagyl  mg q8h (d/c 8/3/18 by ID)  -ID recs: limit abx exposure, cont current abx regimen, contact isolation, do not add new abx for cystitis  -GI recs: cont current abx for now. Hold a/c due to risk of bleeding.  -PT evals done 8/7/18. Pt cleared for d/c to [facility].  - D/C planning for today 8/8/18 to [facility] pending available bed.

## 2018-08-08 NOTE — PROGRESS NOTE ADULT - ATTENDING COMMENTS
on exam- sleepy but comfortable and feeling much better, on nasal canula    Rs- aee improved   pa-less big abdomen, soft ,bs+    #out of bed to chair, incentive spirometry, supportive care    #Rectal tube out now. discharge plan
Agree with above evaluation of the patient by residents. patient clinically improving. No new complaints.
on exam - comfortable   -pa-soft,bs+    #A/P- d/c today if no social issue
on exam- comfortable   -P/A- soft, bs+    #Discharge plan, d/c if no social issue, time spent 65 minutes
on exam- comfortable   P/A- soft, bs+    #discharge plan for tomorrow
on exam- sleepy but comfortable and feeling much better, on nasal canula    Rs- aee improved   pa-less big abdomen, soft ,bs+    #out of bed to chair, incentive spirometry, supportive care    #Rectal tube out now. discharge plan
on exam-comfortable   -rs-aeeb,cta    #supportive care, discharge plan
on exam- comfortable   -RS- aeeb,cta   -p/a-soft,bs+    #ct abx for C diff
on exam- comfortable   -p/a-soft, slightly tender on deep palpation- ? sensitive abdomen, bs+    #ct supportive care, discharge plan-awaiting HD chair placement

## 2018-08-08 NOTE — PROGRESS NOTE ADULT - SUBJECTIVE AND OBJECTIVE BOX
Patient is a 83y old  Male who presents with a chief complaint of Fever/Diarrhea (20 Jul 2018 13:30)      HPI:  Pt is a 84 y/o M w/pmhx of Afib, CHF, CAD s/p stents, COPD, PNA, upper GI bleed (duodenal)  BIB EMS from [facility] assisted living for fever, abd pain, and diarrhea that began 3 weeks ago. Was in the hospital for PNA tx and was later dx with C-diff and started on a course of PO vancomycin for 10 days for the C-diff. States that he has had diarrhea since before the course of abx. Pain has been increased for the past few weeks and is characterized as a cramping feeling. Daughters also note that there is increased distension. Also notes chest pain characterized as a cramping in the central chest. 83% O2 sat noted this morning at the assisted living facility. Takes O2 routinely at night but not during the day. (06 Jul 2018 23:55)    Patient for comfortable. Had 2  bowel movements yesterday which were more formed. Increased urine production. Ascites is decreasing.  Feels a bit stronger but is still is fatigued.  less abd crampy pain      PAST MEDICAL & SURGICAL HISTORY:  Diastolic CHF  Peripheral vascular disease  Afib  Anemia  CKD (chronic kidney disease)  COPD (chronic obstructive pulmonary disease)  SHAYY (obstructive sleep apnea)  Sepsis, due to unspecified organism: 2/2 poorly healing wounds b/l  Dyspepsia: On moderate exertion.  Sleep apnea, obstructive: Requires home 02 therapy, and treatment with BIPAP  Atelectasis  Pleural effusion, bilateral  Respiratory failure  Peripheral edema  CRI (chronic renal insufficiency)  Gout  Benign prostatic hypertrophy  Spinal stenosis  Hypercholesterolemia  GERD (gastroesophageal reflux disease)  CAD (coronary artery disease)  Hypertension  S/P angioplasty with stent  Cataract of left eye  Prostate: Surgery green light procedure.  S/P rotator cuff surgery: Right  S/P angioplasty      MEDICATIONS  (STANDING):  ALBUTerol/ipratropium for Nebulization. 3 milliLiter(s) Nebulizer once  allopurinol 100 milliGRAM(s) Oral daily  aspirin  chewable 81 milliGRAM(s) Oral daily  buDESOnide   0.5 milliGRAM(s) Respule 0.5 milliGRAM(s) Inhalation two times a day  cholestyramine Powder (Sugar-Free) 4 Gram(s) Oral two times a day  diltiazem    Tablet 30 milliGRAM(s) Oral every 6 hours  doxazosin 8 milliGRAM(s) Oral at bedtime  epoetin nelly Injectable 31496 Unit(s) IV Push <User Schedule>  finasteride 5 milliGRAM(s) Oral daily  heparin  Injectable 5000 Unit(s) SubCutaneous every 12 hours  metoclopramide 5 milliGRAM(s) Oral three times a day  pantoprazole    Tablet 40 milliGRAM(s) Oral every 12 hours  simvastatin 10 milliGRAM(s) Oral at bedtime  sodium ferric gluconate complex Injectable 125 milliGRAM(s) IV Push <User Schedule>  vancomycin    Solution 500 milliGRAM(s) Oral every 6 hours    MEDICATIONS  (PRN):  acetaminophen   Tablet 650 milliGRAM(s) Oral every 8 hours PRN For Temp greater than 38 C (100.4 F)  acetaminophen   Tablet. 650 milliGRAM(s) Oral every 8 hours PRN Mild Pain (1 - 3)  albumin human 25% IVPB 50 milliLiter(s) IV Intermittent <User Schedule> PRN sbp<=120mmhg  ALBUTerol   0.5% 2.5 milliGRAM(s) Nebulizer every 4 hours PRN Shortness of Breath and/or Wheezing  diphenhydrAMINE   Capsule 25 milliGRAM(s) Oral at bedtime PRN sleep  fluticasone propionate 50 MICROgram(s)/spray Nasal Spray 1 Spray(s) Both Nostrils two times a day PRN runny nose  nystatin Powder 1 Application(s) Topical two times a day PRN fungal rash  ondansetron Injectable 4 milliGRAM(s) IV Push every 6 hours PRN Nausea and/or Vomiting      Allergies    Zosyn (Rash)    Intolerances        SOCIAL HISTORY:NC    FAMILY HISTORY:  No pertinent family history in first degree relatives      REVIEW OF SYSTEMS:    CONSTITUTIONAL: No weakness, fevers or chills  EYES/ENT: No visual changes;  No vertigo or throat pain   NECK: No pain or stiffness  RESPIRATORY: No cough, wheezing, hemoptysis; No shortness of breath  CARDIOVASCULAR: No chest pain or palpitations  GENITOURINARY: No dysuria, frequency or hematuria  NEUROLOGICAL: No numbness or weakness  SKIN: No itching, burning, rashes, or lesions   All other review of systems is negative unless indicated above.    Vital Signs Last 24 Hrs  T(C): 36.8 (07 Aug 2018 05:31), Max: 37.1 (06 Aug 2018 13:09)  T(F): 98.2 (07 Aug 2018 05:31), Max: 98.8 (06 Aug 2018 13:09)  HR: 64 (07 Aug 2018 05:31) (63 - 89)  BP: 154/37 (07 Aug 2018 05:31) (112/42 - 163/50)  BP(mean): --  RR: 18 (07 Aug 2018 05:31) (18 - 18)  SpO2: 97% (07 Aug 2018 05:31) (96% - 98%)    PHYSICAL EXAM:    Constitutional: NAD, well-developed  HEENT: EOMI, throat clear  Neck: No LAD, supple  Respiratory: CTA and P  Cardiovascular: S1 and S2, RRR, no M  Gastrointestinal: BS+, soft, mild lower tender/ND, neg HSM,dec ascites  Extremities: No peripheral edema, neg clubing, cyanosis    Neurological: A/O x 3, no focal deficits  Psychiatric: Normal mood, normal affect  Skin: No rashes    LABS:  CBC Full  -  ( 07 Aug 2018 05:13 )  WBC Count : 4.05 K/uL  Hemoglobin : 8.3 g/dL  Hematocrit : 27.1 %  Platelet Count - Automated : 196 K/uL  Mean Cell Volume : 97.1 fl  Mean Cell Hemoglobin : 29.7 pg  Mean Cell Hemoglobin Concentration : 30.6 gm/dL  Auto Neutrophil # : x  Auto Lymphocyte # : x  Auto Monocyte # : x  Auto Eosinophil # : x  Auto Basophil # : x  Auto Neutrophil % : x  Auto Lymphocyte % : x  Auto Monocyte % : x  Auto Eosinophil % : x  Auto Basophil % : x    08-06    142  |  110<H>  |  44<H>  ----------------------------<  75  4.1   |  23  |  4.09<H>    Ca    7.7<L>      06 Aug 2018 08:32  Phos  3.9     08-06  Mg     1.7     08-07    TPro  x   /  Alb  2.3<L>  /  TBili  x   /  DBili  x   /  AST  x   /  ALT  x   /  AlkPhos  x   08-06            RADIOLOGY & ADDITIONAL STUDIES:

## 2018-08-08 NOTE — PROGRESS NOTE ADULT - PROBLEM SELECTOR PLAN 2
- continue to maintain fluid balance with HD BIW  - control HFpEF with medical management   - O2 as needed  - As per GI, no paracentesis needed for ascites since there is no respiratory distress.  - Pulm appreciated, add nebs  - Incentive Spirometry  - Moderate Ascites - contributing to desaturation; s/p Paracentesis 7/23/18 w/ 2.8 L fluid removed, improved respiratory status   - 4L NC O2 at night and on exertion as needed  - Hourizadeh appreciated, no longer needs BIPAP.

## 2018-08-08 NOTE — PROGRESS NOTE ADULT - SUBJECTIVE AND OBJECTIVE BOX
NEPHROLOGY INTERVAL HPI/OVERNIGHT EVENTS:  8/8 SY  Feeling fair.  No SOB.  No acute events.    8/7 SY  Post HD yesterday  Feeling fair.   Denies SOB.   Fair appetite.      MEDICATIONS  (STANDING):  ALBUTerol/ipratropium for Nebulization. 3 milliLiter(s) Nebulizer once  allopurinol 100 milliGRAM(s) Oral daily  aspirin  chewable 81 milliGRAM(s) Oral daily  buDESOnide   0.5 milliGRAM(s) Respule 0.5 milliGRAM(s) Inhalation two times a day  cholestyramine Powder (Sugar-Free) 4 Gram(s) Oral two times a day  diltiazem    Tablet 30 milliGRAM(s) Oral every 6 hours  doxazosin 8 milliGRAM(s) Oral at bedtime  epoetin nelly Injectable 53855 Unit(s) IV Push <User Schedule>  finasteride 5 milliGRAM(s) Oral daily  heparin  Injectable 5000 Unit(s) SubCutaneous every 12 hours  metoclopramide 5 milliGRAM(s) Oral three times a day  pantoprazole    Tablet 40 milliGRAM(s) Oral every 12 hours  simvastatin 10 milliGRAM(s) Oral at bedtime  sodium ferric gluconate complex Injectable 125 milliGRAM(s) IV Push <User Schedule>  vancomycin    Solution 500 milliGRAM(s) Oral every 8 hours    MEDICATIONS  (PRN):  acetaminophen   Tablet 650 milliGRAM(s) Oral every 8 hours PRN For Temp greater than 38 C (100.4 F)  acetaminophen   Tablet. 650 milliGRAM(s) Oral every 8 hours PRN Mild Pain (1 - 3)  albumin human 25% IVPB 50 milliLiter(s) IV Intermittent <User Schedule> PRN sbp<=120mmhg  ALBUTerol   0.5% 2.5 milliGRAM(s) Nebulizer every 4 hours PRN Shortness of Breath and/or Wheezing  diphenhydrAMINE   Capsule 25 milliGRAM(s) Oral at bedtime PRN sleep  fluticasone propionate 50 MICROgram(s)/spray Nasal Spray 1 Spray(s) Both Nostrils two times a day PRN runny nose  nystatin Powder 1 Application(s) Topical two times a day PRN fungal rash  ondansetron Injectable 4 milliGRAM(s) IV Push every 6 hours PRN Nausea and/or Vomiting          Vital Signs Last 24 Hrs  T(C): 36.7 (08 Aug 2018 05:06), Max: 36.7 (07 Aug 2018 12:05)  T(F): 98 (08 Aug 2018 05:06), Max: 98.1 (07 Aug 2018 12:05)  HR: 70 (08 Aug 2018 08:14) (69 - 76)  BP: 151/43 (08 Aug 2018 05:06) (131/36 - 151/43)  BP(mean): --  RR: 18 (08 Aug 2018 05:06) (18 - 18)  SpO2: 95% (08 Aug 2018 05:06) (95% - 99%)  Daily     Daily       PHYSICAL EXAM:  Alert and appropriate  GENERAL: no distress  CHEST/LUNG: fair air entry  HEART: S1S2 RRR  ABDOMEN: soft  EXTREMITIES: no edema  SKIN:     LABS:                        8.4    4.31  )-----------( 185      ( 08 Aug 2018 06:53 )             27.4     08-08    145  |  110<H>  |  35<H>  ----------------------------<  87  3.7   |  25  |  3.47<H>    Ca    7.9<L>      08 Aug 2018 06:53  Mg     1.7     08-07                  RADIOLOGY & ADDITIONAL TESTS:

## 2018-08-08 NOTE — PROGRESS NOTE ADULT - SUBJECTIVE AND OBJECTIVE BOX
CHIEF COMPLAINT: Diarrhea * 3 weeks    SUBJECTIVE:   HPI: This is a 84 y/o M w/pmhx of Afib, CHF, CAD s/p stents, COPD, recent PNA on abx, upper GI bleed (duodenal)  BIB EMS from [facility] for fever, abd pain, and diarrhea that began 3 weeks prior to admission. Was in the hospital for PNA tx and was later dx with C-diff and started on a course of PO vancomycin for 10 days for the C-diff. States that he has had diarrhea since before the course of abx. Pain has been increased for the past few weeks and is characterized as a cramping feeling. Daughters also note that there is increased distension. Also notes chest pain characterized as a cramping in the central chest. 83% O2 sat noted morning of admission on 7/6/18, at the [facility] living facility. Takes O2 routinely at night but not during the day.    8/8/18:   Pt was seen and examined. Pt had no acute complaints. Minimal diarrhea overnight. Still have abdominal distention but pt reports its better than previously. RLQ tenderness. tolerating po diet. PT eval recommend d/c to subacute rehab. Denies headaches, cp, sob, fever, chills.     REVIEW OF SYSTEMS:  CONSTITUTIONAL: No weakness, fevers or chills  EYES/ENT: No visual changes;  No vertigo or throat pain   RESPIRATORY: No cough, wheezing, hemoptysis; No shortness of breath  CARDIOVASCULAR: No chest pain or palpitations  GASTROINTESTINAL: No nausea, vomiting, or hematemesis; No constipation. No melena or hematochezia.  GENITOURINARY: No dysuria, frequency or hematuria  NEUROLOGICAL: No numbness or weakness  SKIN: No itching, burning, rashes, or lesions   All other review of systems is negative unless indicated above    Vital Signs Last 24 Hrs  T(C): 36.8 (08 Aug 2018 11:16), Max: 36.8 (08 Aug 2018 11:16)  T(F): 98.2 (08 Aug 2018 11:16), Max: 98.2 (08 Aug 2018 11:16)  HR: 72 (08 Aug 2018 11:16) (70 - 76)  BP: 152/41 (08 Aug 2018 11:16) (142/39 - 152/41)  BP(mean): --  RR: 18 (08 Aug 2018 11:16) (18 - 18)  SpO2: 100% (08 Aug 2018 11:16) (95% - 100%)    I&O's Summary      CAPILLARY BLOOD GLUCOSE          PHYSICAL EXAM:  Constitutional: Awake and alert.  HEENT: PERR, Normal Hearing.  Neck: Soft and supple, No JVD  Respiratory: Breath sounds are clear bilaterally  Cardiovascular: S1 and S2, regular rate and rhythm.  Gastrointestinal: Bowel Sounds present, soft, RLQ tender, moderately distended, + guarding, no rebound  Extremities: No peripheral edema  Vascular: 2+ peripheral pulses  Neurological: A/O x 3.    MEDICATIONS:  MEDICATIONS  (STANDING):  ALBUTerol/ipratropium for Nebulization. 3 milliLiter(s) Nebulizer once  allopurinol 100 milliGRAM(s) Oral daily  aspirin  chewable 81 milliGRAM(s) Oral daily  buDESOnide   0.5 milliGRAM(s) Respule 0.5 milliGRAM(s) Inhalation two times a day  cholestyramine Powder (Sugar-Free) 4 Gram(s) Oral two times a day  diltiazem    Tablet 30 milliGRAM(s) Oral every 6 hours  doxazosin 8 milliGRAM(s) Oral at bedtime  epoetin nelly Injectable 69554 Unit(s) IV Push <User Schedule>  finasteride 5 milliGRAM(s) Oral daily  heparin  Injectable 5000 Unit(s) SubCutaneous every 12 hours  metoclopramide 5 milliGRAM(s) Oral three times a day  pantoprazole    Tablet 40 milliGRAM(s) Oral every 12 hours  simvastatin 10 milliGRAM(s) Oral at bedtime  sodium ferric gluconate complex Injectable 125 milliGRAM(s) IV Push <User Schedule>  vancomycin    Solution 500 milliGRAM(s) Oral every 8 hours      LABS: All Labs Reviewed:                        8.4    4.31  )-----------( 185      ( 08 Aug 2018 06:53 )             27.4     08-08    145  |  110<H>  |  35<H>  ----------------------------<  87  3.7   |  25  |  3.47<H>    Ca    7.9<L>      08 Aug 2018 06:53  Mg     1.7     08-07

## 2018-08-08 NOTE — PROGRESS NOTE ADULT - ASSESSMENT
c diff colitis  By mouth vancomycin, high dose   case discussed with  family  Patient with continued abdominal pain but improving and possible inc diarrhea, improving slowly  cont to monitor      Patient had a recent course of C. difficile that was treated with vancomycin with a recurrence and he's had C. difficile in the past.  Due to severe C. difficile, would strongly consider a prolonged taper of vancomycin.  For now, would complete two-week course of vancomycin 500 qid  and then would taper her vancomycin over a long time.  Discussed with family.  Taper vancomycin  Vancomycin 500 mg 4 times a day, total of 2 weeks  Then 3 times a day for 2 weeks, then twice a day for one week, then daily for one week, then every other day for 1 week, then every 3rd day for 1 week.  Follow-up with me after that  Vanco taper eventually. However At this time, would continue high-dose vancomycin with Flagyl. Is improving slowly on it.  Encourage by mouth intake, encourage physical therapy    improving and DC planning in progress    DW hospitalist      A fibrillation    COPD obesity, obstructive sleep apnea, coronary artery disease, overall comorbid risk factors     IVF per renal  HD as needed, but is making more urine and it is encouraging

## 2018-08-08 NOTE — PROGRESS NOTE ADULT - PROBLEM SELECTOR PLAN 3
- s/p Tunnel Cath on 7/16/18, patient currently HD dependent  - Nephro consult appreciated: cont HD BIW at rehab  - S/p Transfusion and EPO with HD 8/7/18

## 2018-08-09 VITALS
OXYGEN SATURATION: 99 % | DIASTOLIC BLOOD PRESSURE: 41 MMHG | SYSTOLIC BLOOD PRESSURE: 144 MMHG | HEART RATE: 73 BPM | RESPIRATION RATE: 17 BRPM | TEMPERATURE: 98 F

## 2018-08-09 LAB
ALBUMIN SERPL ELPH-MCNC: 2.6 G/DL — LOW (ref 3.3–5)
ANION GAP SERPL CALC-SCNC: 8 MMOL/L — SIGNIFICANT CHANGE UP (ref 5–17)
BUN SERPL-MCNC: 46 MG/DL — HIGH (ref 7–23)
CALCIUM SERPL-MCNC: 8.2 MG/DL — LOW (ref 8.5–10.1)
CHLORIDE SERPL-SCNC: 110 MMOL/L — HIGH (ref 96–108)
CO2 SERPL-SCNC: 26 MMOL/L — SIGNIFICANT CHANGE UP (ref 22–31)
CREAT SERPL-MCNC: 4.12 MG/DL — HIGH (ref 0.5–1.3)
GLUCOSE SERPL-MCNC: 93 MG/DL — SIGNIFICANT CHANGE UP (ref 70–99)
PHOSPHATE SERPL-MCNC: 4.2 MG/DL — SIGNIFICANT CHANGE UP (ref 2.5–4.5)
POTASSIUM SERPL-MCNC: 4.1 MMOL/L — SIGNIFICANT CHANGE UP (ref 3.5–5.3)
POTASSIUM SERPL-SCNC: 4.1 MMOL/L — SIGNIFICANT CHANGE UP (ref 3.5–5.3)
SODIUM SERPL-SCNC: 144 MMOL/L — SIGNIFICANT CHANGE UP (ref 135–145)

## 2018-08-09 RX ORDER — DUTASTERIDE 0.5 MG/1
1 CAPSULE, LIQUID FILLED ORAL
Qty: 30 | Refills: 0
Start: 2018-08-09 | End: 2018-09-07

## 2018-08-09 RX ORDER — VANCOMYCIN HCL 1 G
500 VIAL (EA) INTRAVENOUS
Qty: 0 | Refills: 0 | COMMUNITY

## 2018-08-09 RX ORDER — POLYMYXIN B SULF/TRIMETHOPRIM 10000-1/ML
1 DROPS OPHTHALMIC (EYE)
Qty: 0 | Refills: 0 | COMMUNITY
Start: 2018-08-09

## 2018-08-09 RX ORDER — POLYMYXIN B SULF/TRIMETHOPRIM 10000-1/ML
1 DROPS OPHTHALMIC (EYE) EVERY 6 HOURS
Qty: 0 | Refills: 0 | Status: DISCONTINUED | OUTPATIENT
Start: 2018-08-09 | End: 2018-08-09

## 2018-08-09 RX ADMIN — Medication 81 MILLIGRAM(S): at 12:26

## 2018-08-09 RX ADMIN — Medication 100 MILLIGRAM(S): at 12:27

## 2018-08-09 RX ADMIN — HEPARIN SODIUM 5000 UNIT(S): 5000 INJECTION INTRAVENOUS; SUBCUTANEOUS at 05:45

## 2018-08-09 RX ADMIN — Medication 5 MILLIGRAM(S): at 13:08

## 2018-08-09 RX ADMIN — CHOLESTYRAMINE 4 GRAM(S): 4 POWDER, FOR SUSPENSION ORAL at 09:57

## 2018-08-09 RX ADMIN — Medication 5 MILLIGRAM(S): at 05:46

## 2018-08-09 RX ADMIN — Medication 500 MILLIGRAM(S): at 13:08

## 2018-08-09 RX ADMIN — PANTOPRAZOLE SODIUM 40 MILLIGRAM(S): 20 TABLET, DELAYED RELEASE ORAL at 05:45

## 2018-08-09 RX ADMIN — Medication 500 MILLIGRAM(S): at 05:45

## 2018-08-09 RX ADMIN — Medication 0.5 MILLIGRAM(S): at 08:42

## 2018-08-09 RX ADMIN — FINASTERIDE 5 MILLIGRAM(S): 5 TABLET, FILM COATED ORAL at 12:26

## 2018-08-09 NOTE — PROGRESS NOTE ADULT - PROBLEM SELECTOR PROBLEM 4
Afib

## 2018-08-09 NOTE — PROGRESS NOTE ADULT - PROBLEM SELECTOR PROBLEM 2
Acute respiratory failure with hypoxia

## 2018-08-09 NOTE — PROGRESS NOTE ADULT - SUBJECTIVE AND OBJECTIVE BOX
CHIEF COMPLAINT: Diarrhea * 3weeks    SUBJECTIVE:   HPI: This is a 82 y/o M w/pmhx of Afib, CHF, CAD s/p stents, COPD, recent PNA on abx, upper GI bleed (duodenal)  BIB EMS from [facility] for fever, abd pain, and diarrhea that began 3 weeks prior to admission. Was in the hospital for PNA tx and was later dx with C-diff and started on a course of PO vancomycin for 10 days for the C-diff. States that he has had diarrhea since before the course of abx. Pain has been increased for the past few weeks and is characterized as a cramping feeling. Daughters also note that there is increased distension. Also notes chest pain characterized as a cramping in the central chest. 83% O2 sat noted morning of admission on 7/6/18, at the [facility] living facility. Takes O2 routinely at night but not during the day.    8/8/18:   Pt was seen and evaluated. Pt has conjunctivitis of the left eye. Pt noticed it began abt 3 days ago but was mild. Last night he had difficulty opening his eye d/t crusting. No other complaints. No diarrhea overnight. Still have abdominal distention but pt reports its getting better. RLQ tenderness. Tolerating po diet. Denies headaches, cp, sob, fever, chills.       REVIEW OF SYSTEMS:  CONSTITUTIONAL: No weakness, fevers or chills  EYES/ENT: No visual changes;  No vertigo or throat pain   NECK: No pain or stiffness  RESPIRATORY: No cough, wheezing, hemoptysis; No shortness of breath  CARDIOVASCULAR: No chest pain or palpitations  GASTROINTESTINAL: No nausea, vomiting, or hematemesis; No diarrhea or constipation. No melena or hematochezia.  GENITOURINARY: No dysuria, frequency or hematuria  NEUROLOGICAL: No numbness or weakness  SKIN: No itching, burning, rashes, or lesions   All other review of systems is negative unless indicated above    Vital Signs Last 24 Hrs  T(C): 36.4 (09 Aug 2018 12:11), Max: 36.7 (08 Aug 2018 17:06)  T(F): 97.6 (09 Aug 2018 12:11), Max: 98.1 (08 Aug 2018 17:06)  HR: 73 (09 Aug 2018 12:11) (67 - 73)  BP: 144/41 (09 Aug 2018 12:11) (144/41 - 164/35)  RR: 17 (09 Aug 2018 12:11) (16 - 18)  SpO2: 99% (09 Aug 2018 12:11) (95% - 100%)    I&O's Summary    09 Aug 2018 07:01  -  09 Aug 2018 14:14  --------------------------------------------------------  IN: 0 mL / OUT: 50 mL / NET: -50 mL        CAPILLARY BLOOD GLUCOSE          PHYSICAL EXAM:  Constitutional: Awake and alert.  HEENT: PERR, Normal Hearing.  Neck: Soft and supple, No JVD  Respiratory: Breath sounds are clear bilaterally, No wheezing, rales or rhonchi  Cardiovascular: S1 and S2, RRR.  Gastrointestinal: Bowel Sounds present, soft, tender RLQ, moderately distended, + guarding, no rebound  Extremities: No peripheral edema  Vascular: 2+ peripheral pulse  Neurological: A/O x 3.    MEDICATIONS:  MEDICATIONS  (STANDING):  ALBUTerol/ipratropium for Nebulization. 3 milliLiter(s) Nebulizer once  allopurinol 100 milliGRAM(s) Oral daily  aspirin  chewable 81 milliGRAM(s) Oral daily  buDESOnide   0.5 milliGRAM(s) Respule 0.5 milliGRAM(s) Inhalation two times a day  cholestyramine Powder (Sugar-Free) 4 Gram(s) Oral two times a day  diltiazem    Tablet 30 milliGRAM(s) Oral every 6 hours  doxazosin 8 milliGRAM(s) Oral at bedtime  epoetin nelly Injectable 19080 Unit(s) IV Push <User Schedule>  finasteride 5 milliGRAM(s) Oral daily  heparin  Injectable 5000 Unit(s) SubCutaneous every 12 hours  metoclopramide 5 milliGRAM(s) Oral three times a day  pantoprazole    Tablet 40 milliGRAM(s) Oral every 12 hours  simvastatin 10 milliGRAM(s) Oral at bedtime  sodium ferric gluconate complex Injectable 125 milliGRAM(s) IV Push <User Schedule>  vancomycin    Solution 500 milliGRAM(s) Oral every 8 hours      LABS: All Labs Reviewed:                        8.4    4.31  )-----------( 185      ( 08 Aug 2018 06:53 )             27.4     08-09    144  |  110<H>  |  46<H>  ----------------------------<  93  4.1   |  26  |  4.12<H>    Ca    8.2<L>      09 Aug 2018 10:11  Phos  4.2     08-09    TPro  x   /  Alb  2.6<L>  /  TBili  x   /  DBili  x   /  AST  x   /  ALT  x   /  AlkPhos  x   08-09 CHIEF COMPLAINT: Diarrhea * 3weeks    SUBJECTIVE:   HPI: This is a 82 y/o M w/pmhx of Afib, CHF, CAD s/p stents, COPD, recent PNA on abx, upper GI bleed (duodenal)  BIB EMS from [facility] for fever, abd pain, and diarrhea that began 3 weeks prior to admission. Was in the hospital for PNA tx and was later dx with C-diff and started on a course of PO vancomycin for 10 days for the C-diff. States that he has had diarrhea since before the course of abx. Pain has been increased for the past few weeks and is characterized as a cramping feeling. Daughters also note that there is increased distension. Also notes chest pain characterized as a cramping in the central chest. 83% O2 sat noted morning of admission on 7/6/18, at the [facility] living facility. Takes O2 routinely at night but not during the day.    8/9/18:   Pt was seen and evaluated. Pt has conjunctivitis of the left eye. Pt noticed it began abt 3 days ago but was mild. Last night he had difficulty opening his eye d/t crusting. No other complaints. No diarrhea overnight. Still have abdominal distention but pt reports its getting better. RLQ tenderness. Tolerating po diet. Denies headaches, cp, sob, fever, chills.       REVIEW OF SYSTEMS:  CONSTITUTIONAL: No weakness, fevers or chills  EYES/ENT: No visual changes;  No vertigo or throat pain   NECK: No pain or stiffness  RESPIRATORY: No cough, wheezing, hemoptysis; No shortness of breath  CARDIOVASCULAR: No chest pain or palpitations  GASTROINTESTINAL: No nausea, vomiting, or hematemesis; No diarrhea or constipation. No melena or hematochezia.  GENITOURINARY: No dysuria, frequency or hematuria  NEUROLOGICAL: No numbness or weakness  SKIN: No itching, burning, rashes, or lesions   All other review of systems is negative unless indicated above    Vital Signs Last 24 Hrs  T(C): 36.4 (09 Aug 2018 12:11), Max: 36.7 (08 Aug 2018 17:06)  T(F): 97.6 (09 Aug 2018 12:11), Max: 98.1 (08 Aug 2018 17:06)  HR: 73 (09 Aug 2018 12:11) (67 - 73)  BP: 144/41 (09 Aug 2018 12:11) (144/41 - 164/35)  RR: 17 (09 Aug 2018 12:11) (16 - 18)  SpO2: 99% (09 Aug 2018 12:11) (95% - 100%)    I&O's Summary    09 Aug 2018 07:01  -  09 Aug 2018 14:14  --------------------------------------------------------  IN: 0 mL / OUT: 50 mL / NET: -50 mL        CAPILLARY BLOOD GLUCOSE          PHYSICAL EXAM:  Constitutional: Awake and alert.  HEENT: PERR, Normal Hearing.  Neck: Soft and supple, No JVD  Respiratory: Breath sounds are clear bilaterally, No wheezing, rales or rhonchi  Cardiovascular: S1 and S2, RRR.  Gastrointestinal: Bowel Sounds present, soft, tender RLQ, moderately distended, + guarding, no rebound  Extremities: No peripheral edema  Vascular: 2+ peripheral pulse  Neurological: A/O x 3.    MEDICATIONS:  MEDICATIONS  (STANDING):  ALBUTerol/ipratropium for Nebulization. 3 milliLiter(s) Nebulizer once  allopurinol 100 milliGRAM(s) Oral daily  aspirin  chewable 81 milliGRAM(s) Oral daily  buDESOnide   0.5 milliGRAM(s) Respule 0.5 milliGRAM(s) Inhalation two times a day  cholestyramine Powder (Sugar-Free) 4 Gram(s) Oral two times a day  diltiazem    Tablet 30 milliGRAM(s) Oral every 6 hours  doxazosin 8 milliGRAM(s) Oral at bedtime  epoetin nelly Injectable 16567 Unit(s) IV Push <User Schedule>  finasteride 5 milliGRAM(s) Oral daily  heparin  Injectable 5000 Unit(s) SubCutaneous every 12 hours  metoclopramide 5 milliGRAM(s) Oral three times a day  pantoprazole    Tablet 40 milliGRAM(s) Oral every 12 hours  simvastatin 10 milliGRAM(s) Oral at bedtime  sodium ferric gluconate complex Injectable 125 milliGRAM(s) IV Push <User Schedule>  vancomycin    Solution 500 milliGRAM(s) Oral every 8 hours      LABS: All Labs Reviewed:                        8.4    4.31  )-----------( 185      ( 08 Aug 2018 06:53 )             27.4     08-09    144  |  110<H>  |  46<H>  ----------------------------<  93  4.1   |  26  |  4.12<H>    Ca    8.2<L>      09 Aug 2018 10:11  Phos  4.2     08-09    TPro  x   /  Alb  2.6<L>  /  TBili  x   /  DBili  x   /  AST  x   /  ALT  x   /  AlkPhos  x   08-09

## 2018-08-09 NOTE — PROGRESS NOTE ADULT - ASSESSMENT
83M w/ PMH of Afib, CHF, CAD s/p stents, COPD, recent PNA on abx, upper GI bleed (duodenal) was admitted for recurrent c. diff colitis. Currently stable. No stool production overnight. CXR shows pulmonary congestion and mild plural effusion. US shows small to moderate amount of abdominal ascites, greatest in the LLQ. S/p HD, transfusion and EPO (8/7/18). VSS.

## 2018-08-09 NOTE — PROGRESS NOTE ADULT - PROVIDER SPECIALTY LIST ADULT
Cardiology
Colorectal Surgery
Critical Care
Family Medicine
Gastroenterology
Hospitalist
Infectious Disease
Nephrology
Pulmonology
Infectious Disease
Nephrology
Pulmonology
Pulmonology
Cardiology
Cardiology
Gastroenterology
Gastroenterology
Hospitalist
Hospitalist
Infectious Disease
Nephrology
Pulmonology
Hospitalist
Hospitalist
Cardiology
Critical Care
Family Medicine
Family Medicine
Hospitalist
Hospitalist
Family Medicine
Cardiology
Family Medicine
Family Medicine

## 2018-08-09 NOTE — PROGRESS NOTE ADULT - PROBLEM SELECTOR PROBLEM 1
Pseudomembranous colitis

## 2018-08-09 NOTE — PROGRESS NOTE ADULT - SUBJECTIVE AND OBJECTIVE BOX
Patient is a 83y old  Male who presents with a chief complaint of Fever/Diarrhea (20 Jul 2018 13:30)      HPI:  Pt is a 82 y/o M w/pmhx of Afib, CHF, CAD s/p stents, COPD, PNA, upper GI bleed (duodenal)  BIB EMS from [facility] assisted living for fever, abd pain, and diarrhea that began 3 weeks ago. Was in the hospital for PNA tx and was later dx with C-diff and started on a course of PO vancomycin for 10 days for the C-diff. States that he has had diarrhea since before the course of abx. Pain has been increased for the past few weeks and is characterized as a cramping feeling. Daughters also note that there is increased distension. Also notes chest pain characterized as a cramping in the central chest. 83% O2 sat noted this morning at the assisted living facility. Takes O2 routinely at night but not during the day. (06 Jul 2018 23:55)    Patient for comfortable. Had 2  bowel movements yesterday which were more formed.pos urine production. Ascites is decreasing.  Feels a bit stronger but is still is fatigued.  less abd crampy pain but recurred in RLQ last night      PAST MEDICAL & SURGICAL HISTORY:  Diastolic CHF  Peripheral vascular disease  Afib  Anemia  CKD (chronic kidney disease)  COPD (chronic obstructive pulmonary disease)  SHAYY (obstructive sleep apnea)  Sepsis, due to unspecified organism: 2/2 poorly healing wounds b/l  Dyspepsia: On moderate exertion.  Sleep apnea, obstructive: Requires home 02 therapy, and treatment with BIPAP  Atelectasis  Pleural effusion, bilateral  Respiratory failure  Peripheral edema  CRI (chronic renal insufficiency)  Gout  Benign prostatic hypertrophy  Spinal stenosis  Hypercholesterolemia  GERD (gastroesophageal reflux disease)  CAD (coronary artery disease)  Hypertension  S/P angioplasty with stent  Cataract of left eye  Prostate: Surgery green light procedure.  S/P rotator cuff surgery: Right  S/P angioplasty      MEDICATIONS  (STANDING):  ALBUTerol/ipratropium for Nebulization. 3 milliLiter(s) Nebulizer once  allopurinol 100 milliGRAM(s) Oral daily  aspirin  chewable 81 milliGRAM(s) Oral daily  buDESOnide   0.5 milliGRAM(s) Respule 0.5 milliGRAM(s) Inhalation two times a day  cholestyramine Powder (Sugar-Free) 4 Gram(s) Oral two times a day  diltiazem    Tablet 30 milliGRAM(s) Oral every 6 hours  doxazosin 8 milliGRAM(s) Oral at bedtime  epoetin nelly Injectable 38446 Unit(s) IV Push <User Schedule>  finasteride 5 milliGRAM(s) Oral daily  heparin  Injectable 5000 Unit(s) SubCutaneous every 12 hours  metoclopramide 5 milliGRAM(s) Oral three times a day  pantoprazole    Tablet 40 milliGRAM(s) Oral every 12 hours  simvastatin 10 milliGRAM(s) Oral at bedtime  sodium ferric gluconate complex Injectable 125 milliGRAM(s) IV Push <User Schedule>  vancomycin    Solution 500 milliGRAM(s) Oral every 6 hours    MEDICATIONS  (PRN):  acetaminophen   Tablet 650 milliGRAM(s) Oral every 8 hours PRN For Temp greater than 38 C (100.4 F)  acetaminophen   Tablet. 650 milliGRAM(s) Oral every 8 hours PRN Mild Pain (1 - 3)  albumin human 25% IVPB 50 milliLiter(s) IV Intermittent <User Schedule> PRN sbp<=120mmhg  ALBUTerol   0.5% 2.5 milliGRAM(s) Nebulizer every 4 hours PRN Shortness of Breath and/or Wheezing  diphenhydrAMINE   Capsule 25 milliGRAM(s) Oral at bedtime PRN sleep  fluticasone propionate 50 MICROgram(s)/spray Nasal Spray 1 Spray(s) Both Nostrils two times a day PRN runny nose  nystatin Powder 1 Application(s) Topical two times a day PRN fungal rash  ondansetron Injectable 4 milliGRAM(s) IV Push every 6 hours PRN Nausea and/or Vomiting      Allergies    Zosyn (Rash)    Intolerances        SOCIAL HISTORY:NC    FAMILY HISTORY:  No pertinent family history in first degree relatives      REVIEW OF SYSTEMS:    CONSTITUTIONAL: No weakness, fevers or chills  EYES/ENT: No visual changes;  No vertigo or throat pain   NECK: No pain or stiffness  RESPIRATORY: No cough, wheezing, hemoptysis; No shortness of breath  CARDIOVASCULAR: No chest pain or palpitations  GENITOURINARY: No dysuria, frequency or hematuria  NEUROLOGICAL: No numbness or weakness  SKIN: No itching, burning, rashes, or lesions   All other review of systems is negative unless indicated above.    Vital Signs Last 24 Hrs  T(C): 36.8 (07 Aug 2018 05:31), Max: 37.1 (06 Aug 2018 13:09)  T(F): 98.2 (07 Aug 2018 05:31), Max: 98.8 (06 Aug 2018 13:09)  HR: 64 (07 Aug 2018 05:31) (63 - 89)  BP: 154/37 (07 Aug 2018 05:31) (112/42 - 163/50)  BP(mean): --  RR: 18 (07 Aug 2018 05:31) (18 - 18)  SpO2: 97% (07 Aug 2018 05:31) (96% - 98%)    PHYSICAL EXAM:    Constitutional: NAD, well-developed  HEENT: EOMI, throat clear  Neck: No LAD, supple  Respiratory: CTA and P  Cardiovascular: S1 and S2, RRR, no M  Gastrointestinal: BS+, soft, mild R lower tender/ND, neg HSM,dec ascites  Extremities: No peripheral edema, neg clubing, cyanosis    Neurological: A/O x 3, no focal deficits  Psychiatric: Normal mood, normal affect  Skin: No rashes    LABS:  CBC Full  -  ( 07 Aug 2018 05:13 )  WBC Count : 4.05 K/uL  Hemoglobin : 8.3 g/dL  Hematocrit : 27.1 %  Platelet Count - Automated : 196 K/uL  Mean Cell Volume : 97.1 fl  Mean Cell Hemoglobin : 29.7 pg  Mean Cell Hemoglobin Concentration : 30.6 gm/dL  Auto Neutrophil # : x  Auto Lymphocyte # : x  Auto Monocyte # : x  Auto Eosinophil # : x  Auto Basophil # : x  Auto Neutrophil % : x  Auto Lymphocyte % : x  Auto Monocyte % : x  Auto Eosinophil % : x  Auto Basophil % : x    08-06    142  |  110<H>  |  44<H>  ----------------------------<  75  4.1   |  23  |  4.09<H>    Ca    7.7<L>      06 Aug 2018 08:32  Phos  3.9     08-06  Mg     1.7     08-07    TPro  x   /  Alb  2.3<L>  /  TBili  x   /  DBili  x   /  AST  x   /  ALT  x   /  AlkPhos  x   08-06            RADIOLOGY & ADDITIONAL STUDIES:

## 2018-08-09 NOTE — PROGRESS NOTE ADULT - PROBLEM SELECTOR PROBLEM 3
Renal insufficiency

## 2018-08-09 NOTE — PROGRESS NOTE ADULT - PROBLEM SELECTOR PLAN 4
-CHADsVASc= 3  - Currently sinus  - No AC due to hx of Severe GI bleed  - Cardizem as tolerated- will discharge on long term coverage
- Currently sinus  - No AC due to hx of Severe GI bleed  - BP does not tolerate Cardizem or Beta Blocker
-CHADsVASc= 3  - Currently sinus  - No AC due to hx of Severe GI bleed  - Cardizem as tolerated- will discharge on long term coverage
-Currently sinus  -No AC due to hx of Severe GI bleed  -Diltiazem 30mg PO q6h  -Cards appreciated, may switch to long acting Cardizem on discharge
- CHADsVASc= 3  - Currently sinus  - No AC due to hx of Severe GI bleed  - Cardizam as tolerated- will discharge on long term coverage
- Currently sinus  - No AC due to hx of Severe GI bleed  - BP does not tolerate Cardizem or Beta Blocker
- Currently sinus  - No AC due to hx of Severe GI bleed  - Diltiazem 30mg PO q6h  -Cards appreciated, may switch to long acting Cardizem on discharge
- Currently sinus  - No AC due to hx of Severe GI bleed  - Diltiazem 30mg PO q6h  -Cards appreciated, may switch to long acting Cardizem on discharge
-CHADsVASc= 3  - Currently sinus  - No AC due to hx of Severe GI bleed  - BP does not tolerate Cardizem or Beta Blocker
-CHADsVASc= 3  - Currently sinus  - No AC due to hx of Severe GI bleed  - BP does not tolerate Cardizem or Beta Blocker
-CHADsVASc= 3  - Currently sinus  - No AC due to hx of Severe GI bleed  - Cardizam as tolerated- will discharge on long term coverage
-CHADsVASc= 3  - Currently sinus  - No AC due to hx of Severe GI bleed  - Cardizem as tolerated- will discharge on long term coverage
-Currently sinus  -No AC due to hx of Severe GI bleed  -Diltiazem 30mg PO q6h  -Cards appreciated, may switch to long acting Cardizem on discharge
-CHADsVASc= 3  - Currently sinus  - No AC due to hx of Severe GI bleed  - Cardizam as tolerated- will discharge on long term coverage

## 2018-08-09 NOTE — PROGRESS NOTE ADULT - PROBLEM SELECTOR PLAN 1
- Vancomycin po with prolong taper. Instructions in D/C document  - S/p flagyl  mg q8h (d/c 8/3/18 by ID)  -ID recs: limit abx exposure, cont current abx regimen, contact isolation, do not add new abx for cystitis  -GI recs: cont current abx for now. Hold a/c due to risk of bleeding.  -PT evals done 8/7/18. Pt cleared for d/c to [facility].  - D/C planning for today 8/8/18 to [facility] pending available bed.

## 2018-08-09 NOTE — PROGRESS NOTE ADULT - PROBLEM SELECTOR PROBLEM 5
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

## 2018-08-09 NOTE — PROGRESS NOTE ADULT - ASSESSMENT
c diff colitis  By mouth vancomycin, high dose   case discussed with  pt  Patient with continued abdominal pain but improving and possible inc diarrhea, improving slowly  cont to monitor      Patient had a recent course of C. difficile that was treated with vancomycin with a recurrence and he's had C. difficile in the past.  Due to severe C. difficile, would strongly consider a prolonged taper of vancomycin.  For now, would complete two-week course of vancomycin 500 qid  and then would taper her vancomycin over a long time.  Discussed with family.  Taper vancomycin  Vancomycin 500 mg 4 times a day, total of 2 weeks  Then 3 times a day for 2 weeks, then twice a day for one week, then daily for one week, then every other day for 1 week, then every 3rd day for 1 week.  Follow-up with me after that  Vanco taper eventually. However At this time, would continue high-dose vancomycin with Flagyl. Is improving slowly on it.  Encourage by mouth intake, encourage physical therapy    improving and DC planning in progress          A fibrillation    COPD obesity, obstructive sleep apnea, coronary artery disease, overall comorbid risk factors     IVF per renal  HD as needed, but is making more urine and it is encouraging

## 2018-08-13 DIAGNOSIS — R50.9 FEVER, UNSPECIFIED: ICD-10-CM

## 2018-08-13 DIAGNOSIS — G47.33 OBSTRUCTIVE SLEEP APNEA (ADULT) (PEDIATRIC): ICD-10-CM

## 2018-08-13 DIAGNOSIS — A41.9 SEPSIS, UNSPECIFIED ORGANISM: ICD-10-CM

## 2018-08-13 DIAGNOSIS — E66.9 OBESITY, UNSPECIFIED: ICD-10-CM

## 2018-08-13 DIAGNOSIS — I50.32 CHRONIC DIASTOLIC (CONGESTIVE) HEART FAILURE: ICD-10-CM

## 2018-08-13 DIAGNOSIS — E43 UNSPECIFIED SEVERE PROTEIN-CALORIE MALNUTRITION: ICD-10-CM

## 2018-08-13 DIAGNOSIS — J90 PLEURAL EFFUSION, NOT ELSEWHERE CLASSIFIED: ICD-10-CM

## 2018-08-13 DIAGNOSIS — I48.0 PAROXYSMAL ATRIAL FIBRILLATION: ICD-10-CM

## 2018-08-13 DIAGNOSIS — R18.8 OTHER ASCITES: ICD-10-CM

## 2018-08-13 DIAGNOSIS — Z95.5 PRESENCE OF CORONARY ANGIOPLASTY IMPLANT AND GRAFT: ICD-10-CM

## 2018-08-13 DIAGNOSIS — R00.1 BRADYCARDIA, UNSPECIFIED: ICD-10-CM

## 2018-08-13 DIAGNOSIS — I13.2 HYPERTENSIVE HEART AND CHRONIC KIDNEY DISEASE WITH HEART FAILURE AND WITH STAGE 5 CHRONIC KIDNEY DISEASE, OR END STAGE RENAL DISEASE: ICD-10-CM

## 2018-08-13 DIAGNOSIS — J44.9 CHRONIC OBSTRUCTIVE PULMONARY DISEASE, UNSPECIFIED: ICD-10-CM

## 2018-08-13 DIAGNOSIS — Z79.899 OTHER LONG TERM (CURRENT) DRUG THERAPY: ICD-10-CM

## 2018-08-13 DIAGNOSIS — N18.6 END STAGE RENAL DISEASE: ICD-10-CM

## 2018-08-13 DIAGNOSIS — N40.0 BENIGN PROSTATIC HYPERPLASIA WITHOUT LOWER URINARY TRACT SYMPTOMS: ICD-10-CM

## 2018-08-13 DIAGNOSIS — I44.1 ATRIOVENTRICULAR BLOCK, SECOND DEGREE: ICD-10-CM

## 2018-08-13 DIAGNOSIS — J81.0 ACUTE PULMONARY EDEMA: ICD-10-CM

## 2018-08-13 DIAGNOSIS — E87.6 HYPOKALEMIA: ICD-10-CM

## 2018-08-13 DIAGNOSIS — A04.72 ENTEROCOLITIS DUE TO CLOSTRIDIUM DIFFICILE, NOT SPECIFIED AS RECURRENT: ICD-10-CM

## 2018-08-13 DIAGNOSIS — I25.10 ATHEROSCLEROTIC HEART DISEASE OF NATIVE CORONARY ARTERY WITHOUT ANGINA PECTORIS: ICD-10-CM

## 2018-08-13 DIAGNOSIS — Z88.8 ALLERGY STATUS TO OTHER DRUGS, MEDICAMENTS AND BIOLOGICAL SUBSTANCES: ICD-10-CM

## 2018-08-13 DIAGNOSIS — N17.9 ACUTE KIDNEY FAILURE, UNSPECIFIED: ICD-10-CM

## 2018-08-13 DIAGNOSIS — J96.01 ACUTE RESPIRATORY FAILURE WITH HYPOXIA: ICD-10-CM

## 2018-08-22 LAB
CULTURE RESULTS: SIGNIFICANT CHANGE UP
SPECIMEN SOURCE: SIGNIFICANT CHANGE UP

## 2018-09-12 LAB
CULTURE RESULTS: SIGNIFICANT CHANGE UP
SPECIMEN SOURCE: SIGNIFICANT CHANGE UP

## 2018-09-30 NOTE — CONSULT NOTE ADULT - OPHTHALMOLOGIC
EF 20%  Increase lisinopril, 5 mg daily  ICD placed on 9/26  Start Aldactone, 25 mg daily   negative

## 2018-10-05 ENCOUNTER — INPATIENT (INPATIENT)
Facility: HOSPITAL | Age: 83
LOS: 10 days | Discharge: HOME HEALTH | End: 2018-10-16
Attending: FAMILY MEDICINE | Admitting: FAMILY MEDICINE
Payer: MEDICARE

## 2018-10-05 VITALS
RESPIRATION RATE: 18 BRPM | DIASTOLIC BLOOD PRESSURE: 70 MMHG | HEART RATE: 141 BPM | WEIGHT: 145.06 LBS | SYSTOLIC BLOOD PRESSURE: 151 MMHG | OXYGEN SATURATION: 100 % | TEMPERATURE: 103 F | HEIGHT: 67 IN

## 2018-10-05 DIAGNOSIS — Z95.9 PRESENCE OF CARDIAC AND VASCULAR IMPLANT AND GRAFT, UNSPECIFIED: Chronic | ICD-10-CM

## 2018-10-05 LAB
ALBUMIN SERPL ELPH-MCNC: 2.7 G/DL — LOW (ref 3.3–5)
ALP SERPL-CCNC: 74 U/L — SIGNIFICANT CHANGE UP (ref 40–120)
ALT FLD-CCNC: 36 U/L — SIGNIFICANT CHANGE UP (ref 12–78)
ANION GAP SERPL CALC-SCNC: 10 MMOL/L — SIGNIFICANT CHANGE UP (ref 5–17)
APTT BLD: 59.5 SEC — HIGH (ref 27.5–37.4)
AST SERPL-CCNC: 28 U/L — SIGNIFICANT CHANGE UP (ref 15–37)
BASOPHILS # BLD AUTO: 0.03 K/UL — SIGNIFICANT CHANGE UP (ref 0–0.2)
BASOPHILS NFR BLD AUTO: 0.6 % — SIGNIFICANT CHANGE UP (ref 0–2)
BILIRUB SERPL-MCNC: 0.3 MG/DL — SIGNIFICANT CHANGE UP (ref 0.2–1.2)
BUN SERPL-MCNC: 27 MG/DL — HIGH (ref 7–23)
CALCIUM SERPL-MCNC: 8.2 MG/DL — LOW (ref 8.5–10.1)
CHLORIDE SERPL-SCNC: 106 MMOL/L — SIGNIFICANT CHANGE UP (ref 96–108)
CO2 SERPL-SCNC: 23 MMOL/L — SIGNIFICANT CHANGE UP (ref 22–31)
CREAT SERPL-MCNC: 1.59 MG/DL — HIGH (ref 0.5–1.3)
EOSINOPHIL # BLD AUTO: 0.1 K/UL — SIGNIFICANT CHANGE UP (ref 0–0.5)
EOSINOPHIL NFR BLD AUTO: 2.1 % — SIGNIFICANT CHANGE UP (ref 0–6)
GLUCOSE SERPL-MCNC: 105 MG/DL — HIGH (ref 70–99)
HCT VFR BLD CALC: 33.1 % — LOW (ref 39–50)
HGB BLD-MCNC: 10.4 G/DL — LOW (ref 13–17)
IMM GRANULOCYTES NFR BLD AUTO: 0.6 % — SIGNIFICANT CHANGE UP (ref 0–1.5)
INR BLD: 1.14 RATIO — SIGNIFICANT CHANGE UP (ref 0.88–1.16)
LACTATE SERPL-SCNC: 1.2 MMOL/L — SIGNIFICANT CHANGE UP (ref 0.7–2)
LYMPHOCYTES # BLD AUTO: 0.35 K/UL — LOW (ref 1–3.3)
LYMPHOCYTES # BLD AUTO: 7.2 % — LOW (ref 13–44)
MCHC RBC-ENTMCNC: 29.5 PG — SIGNIFICANT CHANGE UP (ref 27–34)
MCHC RBC-ENTMCNC: 31.4 GM/DL — LOW (ref 32–36)
MCV RBC AUTO: 94 FL — SIGNIFICANT CHANGE UP (ref 80–100)
MONOCYTES # BLD AUTO: 0.51 K/UL — SIGNIFICANT CHANGE UP (ref 0–0.9)
MONOCYTES NFR BLD AUTO: 10.6 % — SIGNIFICANT CHANGE UP (ref 2–14)
NEUTROPHILS # BLD AUTO: 3.81 K/UL — SIGNIFICANT CHANGE UP (ref 1.8–7.4)
NEUTROPHILS NFR BLD AUTO: 78.9 % — HIGH (ref 43–77)
NRBC # BLD: 0 /100 WBCS — SIGNIFICANT CHANGE UP (ref 0–0)
PLATELET # BLD AUTO: 143 K/UL — LOW (ref 150–400)
POTASSIUM SERPL-MCNC: 3.5 MMOL/L — SIGNIFICANT CHANGE UP (ref 3.5–5.3)
POTASSIUM SERPL-SCNC: 3.5 MMOL/L — SIGNIFICANT CHANGE UP (ref 3.5–5.3)
PROT SERPL-MCNC: 6.5 GM/DL — SIGNIFICANT CHANGE UP (ref 6–8.3)
PROTHROM AB SERPL-ACNC: 12.3 SEC — SIGNIFICANT CHANGE UP (ref 9.8–12.7)
RBC # BLD: 3.52 M/UL — LOW (ref 4.2–5.8)
RBC # FLD: 14.8 % — HIGH (ref 10.3–14.5)
SODIUM SERPL-SCNC: 139 MMOL/L — SIGNIFICANT CHANGE UP (ref 135–145)
WBC # BLD: 4.83 K/UL — SIGNIFICANT CHANGE UP (ref 3.8–10.5)
WBC # FLD AUTO: 4.83 K/UL — SIGNIFICANT CHANGE UP (ref 3.8–10.5)

## 2018-10-05 PROCEDURE — 71045 X-RAY EXAM CHEST 1 VIEW: CPT | Mod: 26

## 2018-10-05 PROCEDURE — 93010 ELECTROCARDIOGRAM REPORT: CPT

## 2018-10-05 PROCEDURE — 99285 EMERGENCY DEPT VISIT HI MDM: CPT

## 2018-10-05 PROCEDURE — 74176 CT ABD & PELVIS W/O CONTRAST: CPT | Mod: 26

## 2018-10-05 RX ORDER — CEFEPIME 1 G/1
1000 INJECTION, POWDER, FOR SOLUTION INTRAMUSCULAR; INTRAVENOUS ONCE
Qty: 0 | Refills: 0 | Status: DISCONTINUED | OUTPATIENT
Start: 2018-10-05 | End: 2018-10-05

## 2018-10-05 RX ORDER — VANCOMYCIN HCL 1 G
1000 VIAL (EA) INTRAVENOUS ONCE
Qty: 0 | Refills: 0 | Status: COMPLETED | OUTPATIENT
Start: 2018-10-05 | End: 2018-10-05

## 2018-10-05 RX ORDER — SODIUM CHLORIDE 9 MG/ML
1500 INJECTION INTRAMUSCULAR; INTRAVENOUS; SUBCUTANEOUS ONCE
Qty: 0 | Refills: 0 | Status: COMPLETED | OUTPATIENT
Start: 2018-10-05 | End: 2018-10-05

## 2018-10-05 RX ORDER — CEFEPIME 1 G/1
1000 INJECTION, POWDER, FOR SOLUTION INTRAMUSCULAR; INTRAVENOUS ONCE
Qty: 0 | Refills: 0 | Status: COMPLETED | OUTPATIENT
Start: 2018-10-05 | End: 2018-10-05

## 2018-10-05 RX ADMIN — SODIUM CHLORIDE 1500 MILLILITER(S): 9 INJECTION INTRAMUSCULAR; INTRAVENOUS; SUBCUTANEOUS at 21:03

## 2018-10-05 RX ADMIN — SODIUM CHLORIDE 1500 MILLILITER(S): 9 INJECTION INTRAMUSCULAR; INTRAVENOUS; SUBCUTANEOUS at 22:57

## 2018-10-05 RX ADMIN — Medication 250 MILLIGRAM(S): at 22:02

## 2018-10-05 RX ADMIN — CEFEPIME 1000 MILLIGRAM(S): 1 INJECTION, POWDER, FOR SOLUTION INTRAMUSCULAR; INTRAVENOUS at 22:57

## 2018-10-05 RX ADMIN — Medication 1000 MILLIGRAM(S): at 22:57

## 2018-10-05 NOTE — ED PROVIDER NOTE - ENMT, MLM
Airway patent, Nasal mucosa clear. +Dry mucous membranes. Throat has no vesicles, no oropharyngeal exudates and uvula is midline.

## 2018-10-05 NOTE — ED PROVIDER NOTE - MEDICAL DECISION MAKING DETAILS
Code sepsis; plan for broad spectrum abx, 1500cc NS, and reevaluate. Will give another 500cc NS if necessary.

## 2018-10-05 NOTE — ED ADULT TRIAGE NOTE - BP NONINVASIVE DIASTOLIC (MM HG)
Visit Information Date & Time Provider Department Dept. Phone Encounter #  
 12/12/2017  9:00 AM Oly Padron  [place] [phone] [id number] Upcoming Health Maintenance Date Due COLONOSCOPY 1/1/2017 MEDICARE YEARLY EXAM 7/27/2017 GLAUCOMA SCREENING Q2Y 9/6/2019 DTaP/Tdap/Td series (2 - Td) 7/16/2022 Allergies as of 12/12/2017  Review Complete On: 12/12/2017 By: Oly Padron MD  
 No Known Allergies Current Immunizations  Reviewed on 8/14/2013 Name Date Influenza Vaccine Split 10/18/2010 TDAP Vaccine 7/16/2012 Not reviewed this visit You Were Diagnosed With   
  
 Codes Comments Encounter for Medicare annual wellness exam    -  Primary ICD-10-CM: Z00.00 ICD-9-CM: V70.0 Essential hypertension, benign     ICD-10-CM: I10 
ICD-9-CM: 401.1 Insomnia, unspecified type     ICD-10-CM: G47.00 ICD-9-CM: 780.52 Vitamin D deficiency     ICD-10-CM: E55.9 ICD-9-CM: 268.9 Vitals BP Pulse Temp Resp Height(growth percentile) Weight(growth percentile) 137/82 (BP 1 Location: Right arm, BP Patient Position: Sitting) (!) 55 97.4 °F (36.3 °C) (Oral) 18 5' 8\" (1.727 m) 227 lb 3.2 oz (103.1 kg) SpO2 BMI Smoking Status 97% 34.55 kg/m2 Never Smoker Vitals History BMI and BSA Data Body Mass Index Body Surface Area 34.55 kg/m 2 2.22 m 2 Preferred Pharmacy Pharmacy Name Phone [id number] Sep, [facility] [phone] Your Updated Medication List  
  
   
This list is accurate as of: 12/12/17 10:25 AM.  Always use your most recent med list. ADVIL 200 mg tablet Generic drug:  ibuprofen Take 3-4 Tabs by mouth every six (6) hours as needed for Pain. ALEVE 220 mg Cap Generic drug:  naproxen sodium Take 2 Caps by mouth daily as needed. lisinopril 10 mg tablet Commonly known as:  PRINIVIL, ZESTRIL  
TAKE 1 TABLET BY MOUTH EVERY DAY  
  
 pneumococcal 13 trisha conj dip 0.5 mL Syrg injection Commonly known as:  PREVNAR-13  
0.5 mL by IntraMUSCular route once for 1 dose. zolpidem 10 mg tablet Commonly known as:  AMBIEN  
TAKE 1 TABLET BY MOUTH AT BEDTIME AS NEEDED INSOMNIA Prescriptions Printed Refills  
 pneumococcal 13 trisha conj dip (PREVNAR-13) 0.5 mL syrg injection 0 Si.5 mL by IntraMUSCular route once for 1 dose. Class: Print Route: IntraMUSCular Prescriptions Sent to Pharmacy Refills  
 lisinopril (PRINIVIL, ZESTRIL) 10 mg tablet 1 Sig: TAKE 1 TABLET BY MOUTH EVERY DAY Class: Normal  
 [facility] [address] [place] [address], [address] [place] [address] #: [phone] We Performed the Following AMB POC EKG ROUTINE W/ 12 LEADS, INTER & REP [96692 CPT(R)] LIPID PANEL [02532 CPT(R)] METABOLIC PANEL, COMPREHENSIVE [31575 CPT(R)] PSA W/ REFLX FREE PSA [28971 CPT(R)] URINALYSIS W/MICROSCOPIC [86988 CPT(R)] VITAMIN D, 25 HYDROXY [id number] CPT(R)] Patient Instructions Well Visit, Over 72: Care Instructions Your Care Instructions Physical exams can help you stay healthy. Your doctor has checked your overall health and may have suggested ways to take good care of yourself. He or she also may have recommended tests. At home, you can help prevent illness with healthy eating, regular exercise, and other steps. Follow-up care is a key part of your treatment and safety. Be sure to make and go to all appointments, and call your doctor if you are having problems. It's also a good idea to know your test results and keep a list of the medicines you take. How can you care for yourself at home? · Reach and stay at a healthy weight. This will lower your risk for many problems, such as obesity, diabetes, heart disease, and high blood pressure. · Get at least 30 minutes of exercise on most days of the week. Walking is a good choice. You also may want to do other activities, such as running, swimming, cycling, or playing tennis or team sports. · Do not smoke. Smoking can make health problems worse. If you need help quitting, talk to your doctor about stop-smoking programs and medicines. These can increase your chances of quitting for good. · Protect your skin from too much sun. When you're outdoors from 10 a.m. to 4 p.m., stay in the shade or cover up with clothing and a hat with a wide brim. Wear sunglasses that block UV rays. Even when it's cloudy, put broad-spectrum sunscreen (SPF 30 or higher) on any exposed skin. · See a dentist one or two times a year for checkups and to have your teeth cleaned. · Wear a seat belt in the car. · Limit alcohol to 2 drinks a day for men and 1 drink a day for women. Too much alcohol can cause health problems. Follow your doctor's advice about when to have certain tests. These tests can spot problems early. For men and women · Cholesterol. Your doctor will tell you how often to have this done based on your overall health and other things that can increase your risk for heart attack and stroke. · Blood pressure. Have your blood pressure checked during a routine doctor visit. Your doctor will tell you how often to check your blood pressure based on your age, your blood pressure results, and other factors. · Diabetes. Ask your doctor whether you should have tests for diabetes. · Vision. Experts recommend that you have yearly exams for glaucoma and other age-related eye problems. · Hearing. Tell your doctor if you notice any change in your hearing. You can have tests to find out how well you hear. · Colon cancer tests. Keep having colon cancer tests as your doctor recommends. You can have one of several types of tests. · Heart attack and stroke risk.  At least every 4 to 6 years, you should have your risk for heart attack and stroke assessed. Your doctor uses factors such as your age, blood pressure, cholesterol, and whether you smoke or have diabetes to show what your risk for a heart attack or stroke is over the next 10 years. · Osteoporosis. Talk to your doctor about whether you should have a bone density test to find out whether you have thinning bones. Also ask your doctor about whether you should take calcium and vitamin D supplements. For women · Pap test and pelvic exam. You may no longer need a Pap test. Talk with your doctor about whether to stop or continue to have Pap tests. · Breast exam and mammogram. Ask how often you should have a mammogram, which is an X-ray of your breasts. A mammogram can spot breast cancer before it can be felt and when it is easiest to treat. · Thyroid disease. Talk to your doctor about whether to have your thyroid checked as part of a regular physical exam. Women have an increased chance of a thyroid problem. For men · Prostate exam. Talk to your doctor about whether you should have a blood test (called a PSA test) for prostate cancer. Experts disagree on whether men should have this test. Some experts recommend that you discuss the benefits and risks of the test with your doctor. · Abdominal aortic aneurysm. Ask your doctor whether you should have a test to check for an aneurysm. You may need a test if you ever smoked or if your parent, brother, sister, or child has had an aneurysm. When should you call for help? Watch closely for changes in your health, and be sure to contact your doctor if you have any problems or symptoms that concern you. Where can you learn more? Go to http://cedrick-romero.info/. Enter D307 in the search box to learn more about \"Well Visit, Over 65: Care Instructions. \" Current as of: May 12, 2017 Content Version: 11.4 © 5819-9217 Healthwise, Incorporated.  Care instructions adapted under license by [address] (which disclaims liability or warranty for this information). If you have questions about a medical condition or this instruction, always ask your healthcare professional. [address] any warranty or liability for your use of this information. Introducing [facility]! Dear Corinne Aviles: Thank you for requesting a Ambassador account. Our records indicate that you already have an active Ambassador account. You can access your account anytime at [email or web address] Did you know that you can access your hospital and ER discharge instructions at any time in Ambassador? You can also review all of your test results from your hospital stay or ER visit. Additional Information If you have questions, please visit the Frequently Asked Questions section of the Ambassador website at [email or web address] Remember, Ambassador is NOT to be used for urgent needs. For medical emergencies, dial 911. Now available from your iPhone and Android! Please provide this summary of care documentation to your next provider. Your primary care clinician is listed as [place] [address], [facility]/s . If you have any questions after today's visit, please call [phone]. 70

## 2018-10-05 NOTE — ED PROVIDER NOTE - PMH
Above knee amputation of right lower extremity    Afib    Anemia    Atelectasis    Benign prostatic hypertrophy    CAD (coronary artery disease)    CKD (chronic kidney disease)    COPD (chronic obstructive pulmonary disease)    CRI (chronic renal insufficiency)    Diastolic CHF    Dyspepsia  On moderate exertion.  GERD (gastroesophageal reflux disease)    Gout    Hypercholesterolemia    Hypertension    SHAYY (obstructive sleep apnea)    Peripheral edema    Peripheral vascular disease    Pleural effusion, bilateral    Recurrent Clostridium difficile diarrhea    Respiratory failure    Sepsis, due to unspecified organism  2/2 poorly healing wounds b/l  Sleep apnea, obstructive  Requires home 02 therapy, and treatment with BIPAP  Spinal stenosis

## 2018-10-05 NOTE — ED PROVIDER NOTE - OBJECTIVE STATEMENT
82 y/o male with a PMHx of recurrent C-diff, Afib, anemia, atelectasis, CAD, CKD on dialysis ([facility]), COPD, HTN, HLD, and pleural effusion presents to the ED c/o fever. Pt was at dialysis earlier today when he was seen shaking; temperature was taken at that time and pt was determined to have a fever. Pt was given Tylenol for fever at that time. Denies abd pain, cough, or any other complaints. Pt has history of pneumonia in March 2018 with resultant rehab and secondary infections. No other acute complaints at time of eval.

## 2018-10-05 NOTE — ED PROVIDER NOTE - SKIN, MLM
+Dialysis catheter R chest wall. +Diffuse ecchymosis on arms b/l. +Feels hot to the touch. +Dressing on R leg. +R AKA.

## 2018-10-06 LAB
ALBUMIN SERPL ELPH-MCNC: 2.4 G/DL — LOW (ref 3.3–5)
ALP SERPL-CCNC: 63 U/L — SIGNIFICANT CHANGE UP (ref 40–120)
ALT FLD-CCNC: 31 U/L — SIGNIFICANT CHANGE UP (ref 12–78)
ANION GAP SERPL CALC-SCNC: 12 MMOL/L — SIGNIFICANT CHANGE UP (ref 5–17)
APPEARANCE UR: ABNORMAL
AST SERPL-CCNC: 24 U/L — SIGNIFICANT CHANGE UP (ref 15–37)
BACTERIA # UR AUTO: ABNORMAL
BASOPHILS # BLD AUTO: 0.03 K/UL — SIGNIFICANT CHANGE UP (ref 0–0.2)
BASOPHILS NFR BLD AUTO: 0.7 % — SIGNIFICANT CHANGE UP (ref 0–2)
BILIRUB SERPL-MCNC: 0.4 MG/DL — SIGNIFICANT CHANGE UP (ref 0.2–1.2)
BILIRUB UR-MCNC: NEGATIVE — SIGNIFICANT CHANGE UP
BUN SERPL-MCNC: 39 MG/DL — HIGH (ref 7–23)
CALCIUM SERPL-MCNC: 7.9 MG/DL — LOW (ref 8.5–10.1)
CHLORIDE SERPL-SCNC: 110 MMOL/L — HIGH (ref 96–108)
CO2 SERPL-SCNC: 22 MMOL/L — SIGNIFICANT CHANGE UP (ref 22–31)
COLOR SPEC: YELLOW — SIGNIFICANT CHANGE UP
CREAT SERPL-MCNC: 2.41 MG/DL — HIGH (ref 0.5–1.3)
DIFF PNL FLD: ABNORMAL
EOSINOPHIL # BLD AUTO: 0.16 K/UL — SIGNIFICANT CHANGE UP (ref 0–0.5)
EOSINOPHIL NFR BLD AUTO: 3.8 % — SIGNIFICANT CHANGE UP (ref 0–6)
EPI CELLS # UR: NEGATIVE — SIGNIFICANT CHANGE UP
GLUCOSE SERPL-MCNC: 83 MG/DL — SIGNIFICANT CHANGE UP (ref 70–99)
GLUCOSE UR QL: NEGATIVE MG/DL — SIGNIFICANT CHANGE UP
HCT VFR BLD CALC: 31 % — LOW (ref 39–50)
HGB BLD-MCNC: 9.6 G/DL — LOW (ref 13–17)
HYALINE CASTS # UR AUTO: NEGATIVE /LPF — SIGNIFICANT CHANGE UP
IMM GRANULOCYTES NFR BLD AUTO: 0.7 % — SIGNIFICANT CHANGE UP (ref 0–1.5)
KETONES UR-MCNC: ABNORMAL
LACTATE SERPL-SCNC: 0.8 MMOL/L — SIGNIFICANT CHANGE UP (ref 0.7–2)
LACTATE SERPL-SCNC: 1 MMOL/L — SIGNIFICANT CHANGE UP (ref 0.7–2)
LEUKOCYTE ESTERASE UR-ACNC: ABNORMAL
LYMPHOCYTES # BLD AUTO: 0.37 K/UL — LOW (ref 1–3.3)
LYMPHOCYTES # BLD AUTO: 8.8 % — LOW (ref 13–44)
MAGNESIUM SERPL-MCNC: 1.7 MG/DL — SIGNIFICANT CHANGE UP (ref 1.6–2.6)
MCHC RBC-ENTMCNC: 29.2 PG — SIGNIFICANT CHANGE UP (ref 27–34)
MCHC RBC-ENTMCNC: 31 GM/DL — LOW (ref 32–36)
MCV RBC AUTO: 94.2 FL — SIGNIFICANT CHANGE UP (ref 80–100)
MONOCYTES # BLD AUTO: 0.5 K/UL — SIGNIFICANT CHANGE UP (ref 0–0.9)
MONOCYTES NFR BLD AUTO: 11.8 % — SIGNIFICANT CHANGE UP (ref 2–14)
NEUTROPHILS # BLD AUTO: 3.13 K/UL — SIGNIFICANT CHANGE UP (ref 1.8–7.4)
NEUTROPHILS NFR BLD AUTO: 74.2 % — SIGNIFICANT CHANGE UP (ref 43–77)
NITRITE UR-MCNC: NEGATIVE — SIGNIFICANT CHANGE UP
NRBC # BLD: 0 /100 WBCS — SIGNIFICANT CHANGE UP (ref 0–0)
PH UR: 6 — SIGNIFICANT CHANGE UP (ref 5–8)
PHOSPHATE SERPL-MCNC: 4.5 MG/DL — SIGNIFICANT CHANGE UP (ref 2.5–4.5)
PLATELET # BLD AUTO: 142 K/UL — LOW (ref 150–400)
POTASSIUM SERPL-MCNC: 3.5 MMOL/L — SIGNIFICANT CHANGE UP (ref 3.5–5.3)
POTASSIUM SERPL-SCNC: 3.5 MMOL/L — SIGNIFICANT CHANGE UP (ref 3.5–5.3)
PROT SERPL-MCNC: 5.8 GM/DL — LOW (ref 6–8.3)
PROT UR-MCNC: 500 MG/DL
RAPID RVP RESULT: SIGNIFICANT CHANGE UP
RBC # BLD: 3.29 M/UL — LOW (ref 4.2–5.8)
RBC # FLD: 14.9 % — HIGH (ref 10.3–14.5)
RBC CASTS # UR COMP ASSIST: SIGNIFICANT CHANGE UP /HPF (ref 0–4)
SODIUM SERPL-SCNC: 144 MMOL/L — SIGNIFICANT CHANGE UP (ref 135–145)
SP GR SPEC: 1.01 — SIGNIFICANT CHANGE UP (ref 1.01–1.02)
UROBILINOGEN FLD QL: NEGATIVE MG/DL — SIGNIFICANT CHANGE UP
WBC # BLD: 4.22 K/UL — SIGNIFICANT CHANGE UP (ref 3.8–10.5)
WBC # FLD AUTO: 4.22 K/UL — SIGNIFICANT CHANGE UP (ref 3.8–10.5)
WBC UR QL: SIGNIFICANT CHANGE UP /HPF (ref 0–5)

## 2018-10-06 RX ORDER — AMLODIPINE BESYLATE 2.5 MG/1
1 TABLET ORAL
Qty: 0 | Refills: 0 | COMMUNITY

## 2018-10-06 RX ORDER — FERROUS SULFATE 325(65) MG
1 TABLET ORAL
Qty: 0 | Refills: 0 | COMMUNITY

## 2018-10-06 RX ORDER — ONDANSETRON 8 MG/1
4 TABLET, FILM COATED ORAL EVERY 6 HOURS
Qty: 0 | Refills: 0 | Status: DISCONTINUED | OUTPATIENT
Start: 2018-10-06 | End: 2018-10-13

## 2018-10-06 RX ORDER — BUDESONIDE, MICRONIZED 100 %
0.5 POWDER (GRAM) MISCELLANEOUS
Qty: 0 | Refills: 0 | Status: DISCONTINUED | OUTPATIENT
Start: 2018-10-06 | End: 2018-10-13

## 2018-10-06 RX ORDER — SIMVASTATIN 20 MG/1
10 TABLET, FILM COATED ORAL AT BEDTIME
Qty: 0 | Refills: 0 | Status: DISCONTINUED | OUTPATIENT
Start: 2018-10-06 | End: 2018-10-13

## 2018-10-06 RX ORDER — ACETAMINOPHEN 500 MG
650 TABLET ORAL EVERY 6 HOURS
Qty: 0 | Refills: 0 | Status: DISCONTINUED | OUTPATIENT
Start: 2018-10-06 | End: 2018-10-13

## 2018-10-06 RX ORDER — CEFEPIME 1 G/1
1000 INJECTION, POWDER, FOR SOLUTION INTRAMUSCULAR; INTRAVENOUS DAILY
Qty: 0 | Refills: 0 | Status: DISCONTINUED | OUTPATIENT
Start: 2018-10-06 | End: 2018-10-06

## 2018-10-06 RX ORDER — DOXAZOSIN MESYLATE 4 MG
8 TABLET ORAL AT BEDTIME
Qty: 0 | Refills: 0 | Status: DISCONTINUED | OUTPATIENT
Start: 2018-10-06 | End: 2018-10-13

## 2018-10-06 RX ORDER — CEFEPIME 1 G/1
1000 INJECTION, POWDER, FOR SOLUTION INTRAMUSCULAR; INTRAVENOUS DAILY
Qty: 0 | Refills: 0 | Status: DISCONTINUED | OUTPATIENT
Start: 2018-10-06 | End: 2018-10-08

## 2018-10-06 RX ORDER — ISOSORBIDE MONONITRATE 60 MG/1
1 TABLET, EXTENDED RELEASE ORAL
Qty: 0 | Refills: 0 | COMMUNITY

## 2018-10-06 RX ORDER — ALLOPURINOL 300 MG
100 TABLET ORAL DAILY
Qty: 0 | Refills: 0 | Status: DISCONTINUED | OUTPATIENT
Start: 2018-10-06 | End: 2018-10-13

## 2018-10-06 RX ORDER — VANCOMYCIN HCL 1 G
500 VIAL (EA) INTRAVENOUS
Qty: 0 | Refills: 0 | COMMUNITY

## 2018-10-06 RX ORDER — POTASSIUM CHLORIDE 20 MEQ
20 PACKET (EA) ORAL ONCE
Qty: 0 | Refills: 0 | Status: COMPLETED | OUTPATIENT
Start: 2018-10-06 | End: 2018-10-06

## 2018-10-06 RX ORDER — PANTOPRAZOLE SODIUM 20 MG/1
1 TABLET, DELAYED RELEASE ORAL
Qty: 0 | Refills: 0 | COMMUNITY

## 2018-10-06 RX ORDER — HYDRALAZINE HCL 50 MG
1 TABLET ORAL
Qty: 0 | Refills: 0 | COMMUNITY

## 2018-10-06 RX ORDER — FINASTERIDE 5 MG/1
5 TABLET, FILM COATED ORAL DAILY
Qty: 0 | Refills: 0 | Status: DISCONTINUED | OUTPATIENT
Start: 2018-10-06 | End: 2018-10-13

## 2018-10-06 RX ORDER — VANCOMYCIN HCL 1 G
125 VIAL (EA) INTRAVENOUS EVERY 6 HOURS
Qty: 0 | Refills: 0 | Status: DISCONTINUED | OUTPATIENT
Start: 2018-10-06 | End: 2018-10-13

## 2018-10-06 RX ORDER — METOPROLOL TARTRATE 50 MG
1 TABLET ORAL
Qty: 0 | Refills: 0 | COMMUNITY

## 2018-10-06 RX ORDER — FUROSEMIDE 40 MG
1 TABLET ORAL
Qty: 0 | Refills: 0 | COMMUNITY

## 2018-10-06 RX ORDER — HEPARIN SODIUM 5000 [USP'U]/ML
5000 INJECTION INTRAVENOUS; SUBCUTANEOUS EVERY 8 HOURS
Qty: 0 | Refills: 0 | Status: DISCONTINUED | OUTPATIENT
Start: 2018-10-06 | End: 2018-10-13

## 2018-10-06 RX ORDER — CHOLECALCIFEROL (VITAMIN D3) 125 MCG
1 CAPSULE ORAL
Qty: 0 | Refills: 0 | COMMUNITY

## 2018-10-06 RX ORDER — ASPIRIN/CALCIUM CARB/MAGNESIUM 324 MG
81 TABLET ORAL DAILY
Qty: 0 | Refills: 0 | Status: DISCONTINUED | OUTPATIENT
Start: 2018-10-06 | End: 2018-10-13

## 2018-10-06 RX ADMIN — Medication 650 MILLIGRAM(S): at 13:41

## 2018-10-06 RX ADMIN — FINASTERIDE 5 MILLIGRAM(S): 5 TABLET, FILM COATED ORAL at 12:37

## 2018-10-06 RX ADMIN — CEFEPIME 1000 MILLIGRAM(S): 1 INJECTION, POWDER, FOR SOLUTION INTRAMUSCULAR; INTRAVENOUS at 12:37

## 2018-10-06 RX ADMIN — Medication 81 MILLIGRAM(S): at 12:37

## 2018-10-06 RX ADMIN — HEPARIN SODIUM 5000 UNIT(S): 5000 INJECTION INTRAVENOUS; SUBCUTANEOUS at 17:22

## 2018-10-06 RX ADMIN — Medication 125 MILLIGRAM(S): at 23:51

## 2018-10-06 RX ADMIN — Medication 100 MILLIGRAM(S): at 12:37

## 2018-10-06 RX ADMIN — Medication 650 MILLIGRAM(S): at 12:37

## 2018-10-06 RX ADMIN — Medication 0.5 MILLIGRAM(S): at 08:17

## 2018-10-06 RX ADMIN — SIMVASTATIN 10 MILLIGRAM(S): 20 TABLET, FILM COATED ORAL at 21:53

## 2018-10-06 RX ADMIN — HEPARIN SODIUM 5000 UNIT(S): 5000 INJECTION INTRAVENOUS; SUBCUTANEOUS at 21:53

## 2018-10-06 RX ADMIN — Medication 125 MILLIGRAM(S): at 17:22

## 2018-10-06 RX ADMIN — Medication 8 MILLIGRAM(S): at 21:53

## 2018-10-06 RX ADMIN — Medication 20 MILLIEQUIVALENT(S): at 18:05

## 2018-10-06 NOTE — H&P ADULT - NSHPOUTPATIENTPROVIDERS_GEN_ALL_CORE
PCP; Dr. Masters PCP; Dr. Masters  Renal; Dr. Irma Tomas Renal; Dr. Irma REDDY - Dr. Devante Hernández  Vascular - Dr. Urbano Taylor - Dr. Sosa  Pulmonary - Dr. Maxwell

## 2018-10-06 NOTE — H&P ADULT - ASSESSMENT
84 y/o M PMHx significant for CAD s/p stents, AFib, diastolic CHF, hx of upper GI bleeding, PVD, hypertension, hypertension, severe COPD (O2 dependent), SHAYY on bipap at night, ESRD on HD (MWF), recurrent c. diff who was BIBA from dialysis ([facility]) where he was found to have a post-dialysis fever associated w/ tachycardia. In triage => /min, Tmax 102.7'F. Labs => LA 1.2, BUN/Cr 27/1.59, Alb 2.7, Phos 4.2. UA (+), RVP (-). CT ABD/Pelvis => Trace pericolonic stranding at the level of proximal to mid descending colon. Clinical correlation is advised to assess for mild colitis. No significant wall thickening. Moderate fecal burden. No evidence of small bowel obstruction or extraluminal collection. Re-demonstrated incompletely characterize right hepatic lobe hypodensity during 3 x 1.6 cm for which nonemergent liver protocol MR is advised provided no contraindications. Diffuse bladder wall thickening with trace perivesicular stranding. Correlate with urinalysis to assess for cystitis in appropriate clinical setting. Consider nonemergent retrograde evaluation is concern for bladder malignancy. Uncomplicated cholelithiasis. In the ED the patient was given Xtbnkjbk9c IVPB x 1, Bjtayamofk9q IVPB x 1, NS 1.5L x 1. 84 y/o M PMHx significant for CAD s/p stents, AFib, diastolic CHF, hx of upper GI bleeding, PVD, hypertension, hypertension, severe COPD (O2 dependent), SHAYY on bipap at night, ESRD on HD (MWF), recurrent c. diff who was BIBA from dialysis ([facility]) where he was found to have a post-dialysis fever associated w/ tachycardia. In triage => /min, Tmax 102.7'F. Labs => LA 1.2, BUN/Cr 27/1.59, Alb 2.7, Phos 4.2. UA (+), RVP (-). CT ABD/Pelvis => Trace pericolonic stranding at the level of proximal to mid descending colon. Clinical correlation is advised to assess for mild colitis. No significant wall thickening. Moderate fecal burden. No evidence of small bowel obstruction or extraluminal collection. Re-demonstrated incompletely characterize right hepatic lobe hypodensity during 3 x 1.6 cm for which nonemergent liver protocol MR is advised provided no contraindications. Diffuse bladder wall thickening with trace perivesicular stranding. Correlate with urinalysis to assess for cystitis in appropriate clinical setting. Consider nonemergent retrograde evaluation is concern for bladder malignancy. Uncomplicated cholelithiasis. In the ED the patient was given Pfxolynu0p IVPB x 1, Schccvwpdw5g IVPB x 1, NS 1.5L x 1.    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 contact isolation for now  ~Ennlekrs9p IVPB x 1, Rnnjouarvk6x IVPB x 1 in the ED  ~f/u w/ ID consultation in the am  ~f/u c. diff titers  ~f/u GI PCR  ~f/u w/ GI consultation in the am    2)CAD/AFib  ~cont. rate control w/ Diltiazem 30mg po q6h  ~cont. ASA 81mg po daily    3)BPH  ~cont. Doxazosin 8mg po qHS  ~cont. Dutasteride 0.5mg po qHS    4)COPD/SHAYY  ~cont. Budesonide nebs  ~cont. supplemental O2  ~cont. BiPAP at night    5)ESRD  ~f/u w/ Nephrology in the am  ~f/u am labs    6)Vte ppx  ~IMPROVE VTE Risk Score is 2  [  ] Previous VTE                                                  3  [  ] Thrombophilia                                               2  [  ] Lower limb paralysis                                      2        (unable to hold up >15 seconds)    [  ] Current Cancer                                              2         (within 6 months)  [X] Immobilization > 24 hrs                                1  [  ] ICU/CCU stay > 24 hours                              1  [X] Age > 60                                                      1  ~cont. Heparin sq

## 2018-10-06 NOTE — H&P ADULT - NSHPPHYSICALEXAM_GEN_ALL_CORE
Vital Signs Last 24 Hrs  T(C): 38.1 (05 Oct 2018 23:09), Max: 39.3 (05 Oct 2018 20:02)  T(F): 100.6 (05 Oct 2018 23:09), Max: 102.7 (05 Oct 2018 20:02)  HR: 103 (05 Oct 2018 23:09) (103 - 141)  BP: 116/59 (05 Oct 2018 23:09) (116/59 - 151/70)  RR: 16 (05 Oct 2018 23:09) (16 - 18)  SpO2: 92% (05 Oct 2018 23:09) (92% - 100%)

## 2018-10-06 NOTE — CONSULT NOTE ADULT - ASSESSMENT
84 y/o M PMHx significant for CAD s/p stents, AFib, diastolic CHF, hx of upper GI bleeding, PVD, hypertension, hypertension, severe COPD (O2 dependent), SHAYY on bipap at night, ESRD on HD (MW), recurrent c. diff who was BIBA from dialysis ([facility]) where he was found to have a post-dialysis fever associated w/ tachycardia. In triage => /min, Tmax 102.7'F.   Seen in ER with daughter at bedside  chart reviewed case d/w HD nurses  possible UTI  blood cx done    a/p  ESRD on HD   dialysis via cvc  possible UTI  evaluate for bacteremia as well  duplex of UE for avf

## 2018-10-06 NOTE — CONSULT NOTE ADULT - SUBJECTIVE AND OBJECTIVE BOX
NEPHROLOGY CONSULT  HPI:  82 y/o M PMHx significant for CAD s/p stents, AFib, diastolic CHF, hx of upper GI bleeding, PVD, hypertension, hypertension, severe COPD (O2 dependent), SHAYY on bipap at night, ESRD on HD (MWF), recurrent c. diff who was BIBA from dialysis ([facility]) where he was found to have a post-dialysis fever associated w/ tachycardia. In triage => /min, Tmax 102.7'F. Labs => LA 1.2, BUN/Cr 27/1.59, Alb 2.7, Phos 4.2. UA (+), RVP (-). CT ABD/Pelvis => Trace pericolonic stranding at the level of proximal to mid descending colon. Clinical correlation is advised to assess for mild colitis. No significant wall thickening. Moderate fecal burden. No evidence of small bowel obstruction or extraluminal collection. Re-demonstrated incompletely characterize right hepatic lobe hypodensity during 3 x 1.6 cm for which nonemergent liver protocol MR is advised provided no contraindications. Diffuse bladder wall thickening with trace perivesicular stranding. Correlate with urinalysis to assess for cystitis in appropriate clinical setting. Consider nonemergent retrograde evaluation is concern for bladder malignancy. Uncomplicated cholelithiasis. In the ED the patient was given Aaxtiptw2b IVPB x 1, Bvyfqkjxii9e IVPB x 1, NS 1.5L x 1.   Seen in ER with daughter at bedside  chart reviewed case d/w HD nurses  possible UTI  blood cx done      PAST MEDICAL & SURGICAL HISTORY:  Above knee amputation of right lower extremity  Recurrent Clostridium difficile diarrhea  Diastolic CHF  Peripheral vascular disease  Afib  Anemia  CKD (chronic kidney disease)  COPD (chronic obstructive pulmonary disease)  SHAYY (obstructive sleep apnea)  Sepsis, due to unspecified organism: 2/2 poorly healing wounds b/l  Dyspepsia: On moderate exertion.  Sleep apnea, obstructive: Requires home 02 therapy, and treatment with BIPAP  Atelectasis  Pleural effusion, bilateral  Respiratory failure  Peripheral edema  CRI (chronic renal insufficiency)  Gout  Benign prostatic hypertrophy  Spinal stenosis  Hypercholesterolemia  GERD (gastroesophageal reflux disease)  CAD (coronary artery disease)  Hypertension  S/P angioplasty with stent  Cataract of left eye  Prostate: Surgery green light procedure.  S/P rotator cuff surgery: Right  S/P angioplasty      FAMILY HISTORY:  Family history of colorectal cancer (Father)  Family history of diabetes mellitus (Mother, Sibling)  Family history of hypertension (Mother)      MEDICATIONS  (STANDING):  allopurinol 100 milliGRAM(s) Oral daily  aspirin  chewable 81 milliGRAM(s) Oral daily  buDESOnide   0.5 milliGRAM(s) Respule 0.5 milliGRAM(s) Inhalation two times a day  cefepime  Injectable. 1000 milliGRAM(s) IV Push daily  diltiazem    Tablet 30 milliGRAM(s) Oral every 6 hours  doxazosin 8 milliGRAM(s) Oral at bedtime  finasteride 5 milliGRAM(s) Oral daily  heparin  Injectable 5000 Unit(s) SubCutaneous every 8 hours  simvastatin 10 milliGRAM(s) Oral at bedtime  vancomycin    Solution 125 milliGRAM(s) Oral every 6 hours    MEDICATIONS  (PRN):  acetaminophen   Tablet .. 650 milliGRAM(s) Oral every 6 hours PRN Temp greater or equal to 38C (100.4F), Mild Pain (1 - 3)  ondansetron Injectable 4 milliGRAM(s) IV Push every 6 hours PRN Nausea      Allergies    Zosyn (Rash)    Intolerances        I&O's Summary        REVIEW OF SYSTEMS:    CONSTITUTIONAL:  As per HPI.  CONSTITUTIONAL: No weakness, fevers or chills  EYES/ENT: No visual changes;  No vertigo or throat pain   NECK: No pain or stiffness  CARDIOVASCULAR: No chest pain or palpitations  GASTROINTESTINAL: No abdominal or epigastric pain. No nausea, vomiting, or hematemesis; No diarrhea or constipation. No melena or hematochezia.  GENITOURINARY: No dysuria, frequency or hematuria  NEUROLOGICAL: No numbness or weakness  SKIN: No itching, burning, rashes, or lesions   All other review of systems is negative unless indicated above      Vital Signs Last 24 Hrs  T(C): 37.8 (06 Oct 2018 12:34), Max: 39.3 (05 Oct 2018 20:02)  T(F): 100.1 (06 Oct 2018 12:34), Max: 102.7 (05 Oct 2018 20:02)  HR: 100 (06 Oct 2018 12:34) (85 - 141)  BP: 141/51 (06 Oct 2018 12:34) (116/59 - 151/70)  BP(mean): --  RR: 20 (06 Oct 2018 12:34) (16 - 20)  SpO2: 95% (06 Oct 2018 12:34) (92% - 100%)  Daily Height in cm: 170.18 (05 Oct 2018 20:02)    Daily   I&O's Summary      PHYSICAL EXAM:    HEENT   no jvd  lungs clear   heart RR no rub  abd soft non tender  ext no edema  Urine in urinal cloudy, sediment+    LABS:                                   9.6    4.22  )-----------( 142      ( 06 Oct 2018 11:36 )             31.0       10-06    144  |  110<H>  |  39<H>  ----------------------------<  83  3.5   |  22  |  2.41<H>    Ca    7.9<L>      06 Oct 2018 11:36  Phos  4.5     10-06  Mg     1.7     10-06    TPro  5.8<L>  /  Alb  2.4<L>  /  TBili  0.4  /  DBili  x   /  AST  24  /  ALT  31  /  AlkPhos  63  10-06

## 2018-10-06 NOTE — H&P ADULT - HISTORY OF PRESENT ILLNESS
84 y/o M PMHx significant for CAD s/p stents, AFib, diastolic CHF, hx of upper GI bleeding, PVD, hypertension, hypertension, severe COPD (O2 dependent), SHAYY on bipap at night, ESRD on HD (MWF), recurrent c. diff who was BIBA from dialysis ([facility]) where he was found to have a post-dialysis fever associated w/ tachycardia. In triage => /min, Tmax 102.7'F. Labs => LA 1.2, BUN/Cr 27/1.59, Alb 2.7, Phos 4.2. UA (+), RVP (-). CT ABD/Pelvis => Trace pericolonic stranding at the level of proximal to mid descending colon. Clinical correlation is advised to assess for mild colitis. No significant wall thickening. Moderate fecal burden. No evidence of small bowel obstruction or extraluminal collection. Re-demonstrated incompletely characterize right hepatic lobe hypodensity during 3 x 1.6 cm for which nonemergent liver protocol MR is advised provided no contraindications. Diffuse bladder wall thickening with trace perivesicular stranding. Correlate with urinalysis to assess for cystitis in appropriate clinical setting. Consider nonemergent retrograde evaluation is concern for bladder malignancy. Uncomplicated cholelithiasis. In the ED the patient was given Kfsltgcm1v IVPB x 1, Ujllyujfov0k IVPB x 1, NS 1.5L x 1.

## 2018-10-06 NOTE — PROGRESS NOTE ADULT - ASSESSMENT
84 y/o M PMHx significant for CAD s/p stents, AFib, diastolic CHF, hx of upper GI bleeding, PVD, hypertension, hypertension, severe COPD (O2 dependent), SHAYY on bipap at night, ESRD on HD (MWF), recurrent c. diff who was BIBA from dialysis ([facility]) where he was found to have a post-dialysis fever associated w/ tachycardia. In triage => /min, Tmax 102.7'F. Labs => LA 1.2, BUN/Cr 27/1.59, Alb 2.7, Phos 4.2. UA (+), RVP (-). CT ABD/Pelvis => Trace pericolonic stranding at the level of proximal to mid descending colon. Clinical correlation is advised to assess for mild colitis. No significant wall thickening. Moderate fecal burden. No evidence of small bowel obstruction or extraluminal collection. Re-demonstrated incompletely characterize right hepatic lobe hypodensity during 3 x 1.6 cm for which nonemergent liver protocol MR is advised provided no contraindications. Diffuse bladder wall thickening with trace perivesicular stranding. Correlate with urinalysis to assess for cystitis in appropriate clinical setting. Consider nonemergent retrograde evaluation is concern for bladder malignancy. Uncomplicated cholelithiasis. In the ED the patient was given Yulxbvse2e IVPB x 1, Xbnxmpsprl5k IVPB x 1, NS 1.5L x 1.    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 contact isolation for now  ~Rpwlfphl8n IVPB x 1, Psiyptmqlt3z IVPB x 1 in the ED  ~f/u w/ ID consultation in the am  ~f/u c. diff titers  ~f/u GI PCR  ~f/u w/ GI consultation in the am    2)CAD/AFib  ~cont. rate control w/ Diltiazem 30mg po q6h  ~cont. ASA 81mg po daily    3)BPH  ~cont. Doxazosin 8mg po qHS  ~cont. Dutasteride 0.5mg po qHS    4)COPD/SHAYY  ~cont. Budesonide nebs  ~cont. supplemental O2  ~cont. BiPAP at night    5)ESRD  ~f/u w/ Nephrology in the am  ~f/u am labs    6)Vte ppx 82 y/o M PMHx significant for CAD s/p stents, AFib, diastolic CHF, hx of upper GI bleeding, PVD, hypertension, hypertension, severe COPD (O2 dependent), SHAYY on bipap at night, ESRD on HD ([facility]), recurrent c. diff who was admitted for:     1)Fevers due to probable  infection, likely cystitis  - fevers better   - leukocytosis better   - CT reviewed   - No diarrhea, off isolation   - F/u BCX and UCX   - C/w Ewiavwpc4s IVPB x 1, Gfdwllcavc4e IVPB x 1 in the ED  - Monitor for fevers   - IF and GI eval appreciated         2)CAD/AFib  -  rate control   - c/w  Diltiazem 30mg po q6h  - cont. ASA 81mg po daily    3)BPH  - c/w  Doxazosin 8mg po qHS  - c/w Dutasteride 0.5mg po qHS    4) COPD/SHAYY  - stable respiratory status   -CXR: neg   -cont. Budesonide nebs  -cont. supplemental O2 PRN   - BiPAP at night    5)ESRD  - C/w HD as per Renal     6) P      6)Vte ppx 84 y/o M PMHx significant for CAD s/p stents, AFib, diastolic CHF, hx of upper GI bleeding, PVD, hypertension, hypertension, severe COPD (O2 dependent), SHAYY on bipap at night, ESRD on HD ([place]), recurrent c. diff who was admitted for:     1)Fevers due to probable  infection, likely cystitis  - fevers better   - leukocytosis better   - CT reviewed   - No diarrhea, off isolation   - F/u BCX and UCX   - C/w Znlddznt2q IVPB x 1, Xyleljefnc6f IVPB x 1 in the ED  - Monitor for fevers   - IF and GI eval appreciated         2)CAD/AFib  -  rate control   - c/w  Diltiazem 30mg po q6h  - cont. ASA 81mg po daily    3)BPH  - c/w  Doxazosin 8mg po qHS  - c/w Dutasteride 0.5mg po qHS    4) COPD/SHAYY  - stable respiratory status   -CXR: neg   -cont. Budesonide nebs  -cont. supplemental O2 PRN   - BiPAP at night    5)ESRD  - C/w HD as per Renal     6) PAD, s/p R AKA, Chronic LLE ulcers   could be source of infection?  wound care eval  change dressing with Xeroform for now   Dr kailee toussaint       6)Vte ppx

## 2018-10-06 NOTE — PROGRESS NOTE ADULT - SUBJECTIVE AND OBJECTIVE BOX
CC: Fever and palpitations (06 Oct 2018 03:43)    HPI:  82 y/o M PMHx significant for CAD s/p stents, AFib, diastolic CHF, hx of upper GI bleeding, PVD, hypertension, hypertension, severe COPD (O2 dependent), SHAYY on bipap at night, ESRD on HD (MWF), recurrent c. diff who was BIBA from dialysis ([facility]) where he was found to have a post-dialysis fever associated w/ tachycardia. In triage => /min, Tmax 102.7'F. Labs => LA 1.2, BUN/Cr 27/1.59, Alb 2.7, Phos 4.2. UA (+), RVP (-). CT ABD/Pelvis => Trace pericolonic stranding at the level of proximal to mid descending colon. Clinical correlation is advised to assess for mild colitis. No significant wall thickening. Moderate fecal burden. No evidence of small bowel obstruction or extraluminal collection. Re-demonstrated incompletely characterize right hepatic lobe hypodensity during 3 x 1.6 cm for which nonemergent liver protocol MR is advised provided no contraindications. Diffuse bladder wall thickening with trace perivesicular stranding. Correlate with urinalysis to assess for cystitis in appropriate clinical setting. Consider nonemergent retrograde evaluation is concern for bladder malignancy. Uncomplicated cholelithiasis. In the ED the patient was given Ptdolcnt4m IVPB x 1, Gvqkjttifo9l IVPB x 1, NS 1.5L x 1. (06 Oct 2018 03:43)    INTERVAL HPI/OVERNIGHT EVENTS:    Vital Signs Last 24 Hrs  T(C): 36.9 (06 Oct 2018 08:23), Max: 39.3 (05 Oct 2018 20:02)  T(F): 98.4 (06 Oct 2018 08:23), Max: 102.7 (05 Oct 2018 20:02)  HR: 85 (06 Oct 2018 07:40) (85 - 141)  BP: 143/48 (06 Oct 2018 07:40) (116/59 - 151/70)  BP(mean): --  RR: 19 (06 Oct 2018 07:40) (16 - 19)  SpO2: 94% (06 Oct 2018 07:40) (92% - 100%)  I&O's Detail    REVIEW OF SYSTEMS:    CONSTITUTIONAL: No weakness, fevers or chills  EYES/ENT: No visual changes;  No vertigo or throat pain   NECK: No pain or stiffness  RESPIRATORY: No cough, wheezing, hemoptysis; No shortness of breath  CARDIOVASCULAR: No chest pain or palpitations  GASTROINTESTINAL: No abdominal or epigastric pain. No nausea, vomiting, or hematemesis; No diarrhea or constipation. No melena or hematochezia.  GENITOURINARY: No dysuria, frequency or hematuria  NEUROLOGICAL: No numbness or weakness  SKIN: No itching, burning, rashes, or lesions   All other review of systems is negative unless indicated above.  PHYSICAL EXAM:    General: Well developed; well nourished; in no acute distress  Eyes: PERRLA, EOMI; conjunctiva and sclera clear  Head: Normocephalic; atraumatic  ENMT: No nasal discharge; airway clear  Neck: Supple; non tender; no masses  Respiratory: No wheezes, rales or rhonchi  Cardiovascular: Regular rate and rhythm. S1 and S2 Normal; No murmurs, gallops or rubs  Gastrointestinal: Soft non-tender non-distended; Normal bowel sounds  Genitourinary: No costovertebral angle tenderness  Extremities: Normal range of motion, No clubbing, cyanosis or edema  Vascular: Peripheral pulses palpable 2+ bilaterally  Neurological: Alert and oriented x4  Skin: Warm and dry. No acute rash  Lymph Nodes: No acute cervical adenopathy  Musculoskeletal: Normal gait, tone, without deformities  Psychiatric: Cooperative and appropriate                            10.4   4.83  )-----------( 143      ( 05 Oct 2018 20:40 )             33.1     05 Oct 2018 20:40    139    |  106    |  27     ----------------------------<  105    3.5     |  23     |  1.59     Ca    8.2        05 Oct 2018 20:40    TPro  6.5    /  Alb  2.7    /  TBili  0.3    /  DBili  x      /  AST  28     /  ALT  36     /  AlkPhos  74     05 Oct 2018 20:40    PT/INR - ( 05 Oct 2018 20:40 )   PT: 12.3 sec;   INR: 1.14 ratio         PTT - ( 05 Oct 2018 20:40 )  PTT:59.5 sec  CAPILLARY BLOOD GLUCOSE        LIVER FUNCTIONS - ( 05 Oct 2018 20:40 )  Alb: 2.7 g/dL / Pro: 6.5 gm/dL / ALK PHOS: 74 U/L / ALT: 36 U/L / AST: 28 U/L / GGT: x           Urinalysis Basic - ( 06 Oct 2018 01:57 )    Color: Yellow / Appearance: very cloudy / S.015 / pH: x  Gluc: x / Ketone: Trace  / Bili: Negative / Urobili: Negative mg/dL   Blood: x / Protein: 500 mg/dL / Nitrite: Negative   Leuk Esterase: Moderate / RBC: 0-2 /HPF / WBC TNTC /HPF   Sq Epi: x / Non Sq Epi: Negative / Bacteria: Few        MEDICATIONS  (STANDING):  allopurinol 100 milliGRAM(s) Oral daily  aspirin  chewable 81 milliGRAM(s) Oral daily  buDESOnide   0.5 milliGRAM(s) Respule 0.5 milliGRAM(s) Inhalation two times a day  diltiazem    Tablet 30 milliGRAM(s) Oral every 6 hours  doxazosin 8 milliGRAM(s) Oral at bedtime  finasteride 5 milliGRAM(s) Oral daily  heparin  Injectable 5000 Unit(s) SubCutaneous every 8 hours  simvastatin 10 milliGRAM(s) Oral at bedtime    MEDICATIONS  (PRN):  acetaminophen   Tablet .. 650 milliGRAM(s) Oral every 6 hours PRN Temp greater or equal to 38C (100.4F), Mild Pain (1 - 3)  ondansetron Injectable 4 milliGRAM(s) IV Push every 6 hours PRN Nausea      RADIOLOGY & ADDITIONAL TESTS: CC: Fever and palpitations (06 Oct 2018 03:43)    HPI:  82 y/o M PMHx significant for CAD s/p stents, AFib, diastolic CHF, hx of upper GI bleeding, PVD, hypertension, hypertension, severe COPD (O2 dependent), SHAYY on bipap at night, ESRD on HD (MWF), recurrent c. diff who was BIBA from dialysis ([facility]) where he was found to have a post-dialysis fever associated w/ tachycardia. In triage => /min, Tmax 102.7'F. Labs => LA 1.2, BUN/Cr 27/1.59, Alb 2.7, Phos 4.2. UA (+), RVP (-). CT ABD/Pelvis => Trace pericolonic stranding at the level of proximal to mid descending colon. Clinical correlation is advised to assess for mild colitis. No significant wall thickening. Moderate fecal burden. No evidence of small bowel obstruction or extraluminal collection. Re-demonstrated incompletely characterize right hepatic lobe hypodensity during 3 x 1.6 cm for which nonemergent liver protocol MR is advised provided no contraindications. Diffuse bladder wall thickening with trace perivesicular stranding. Correlate with urinalysis to assess for cystitis in appropriate clinical setting. Consider nonemergent retrograde evaluation is concern for bladder malignancy. Uncomplicated cholelithiasis. In the ED the patient was given Rhsqvqqi0y IVPB x 1, Hawxssuqjm2d IVPB x 1, NS 1.5L x 1. (06 Oct 2018 03:43)    INTERVAL HPI/ OVERNIGHT EVENTS: Chart reviewed, Pt was seen and examined,  confirms history above, states feels better since he was admitted, no further chills and fever, no abd pain, no dysuria, no diarrhea. Pt reports that follows up with wound care and Dr Clement for his chronic LLE ulcers.     Vital Signs Last 24 Hrs  T(C): 36.9 (06 Oct 2018 08:23), Max: 39.3 (05 Oct 2018 20:02)  T(F): 98.4 (06 Oct 2018 08:23), Max: 102.7 (05 Oct 2018 20:02)  HR: 85 (06 Oct 2018 07:40) (85 - 141)  BP: 143/48 (06 Oct 2018 07:40) (116/59 - 151/70)  RR: 19 (06 Oct 2018 07:40) (16 - 19)  SpO2: 94% (06 Oct 2018 07:40) (92% - 100%)      REVIEW OF SYSTEMS:  All other review of systems is negative unless indicated above.      PHYSICAL EXAM:  General: Well developed; well nourished; in no acute distress  Eyes: PERRLA, EOMI; conjunctiva and sclera clear  Head: Normocephalic; atraumatic  ENMT: No nasal discharge; airway clear  Neck: Supple; non tender; no masses  Respiratory: Good air entry  No wheezes, rales or rhonchi  Cardiovascular: Regular rate and rhythm. S1 and S2 Normal; No murmurs  Gastrointestinal: Soft non-tender non-distended; Normal bowel sounds  Genitourinary: No costovertebral angle tenderness  Extremities: No edema, S/p R AKA  Vascular: Peripheral pulses diminished, LLE  Neurological: Alert and oriented x4  Skin: Warm and dry. No acute rash. Multiple LLE ulcers with polymem dressing, anterior ulcer with min exudate  Lymph Nodes: No acute cervical adenopathy  Musculoskeletal: Normal muscle tone, without deformities  Psychiatric: Cooperative and appropriate        LABS:                     10.4   4.83  )-----------( 143      ( 05 Oct 2018 20:40 )             33.1     05 Oct 2018 20:40    139    |  106    |  27     ----------------------------<  105    3.5     |  23     |  1.59     Ca    8.2        05 Oct 2018 20:40    TPro  6.5    /  Alb  2.7    /  TBili  0.3    /  DBili  x      /  AST  28     /  ALT  36     /  AlkPhos  74     05 Oct 2018 20:40    PT/INR - ( 05 Oct 2018 20:40 )   PT: 12.3 sec;   INR: 1.14 ratio         PTT - ( 05 Oct 2018 20:40 )  PTT:59.5 sec  CAPILLARY BLOOD GLUCOSE        LIVER FUNCTIONS - ( 05 Oct 2018 20:40 )  Alb: 2.7 g/dL / Pro: 6.5 gm/dL / ALK PHOS: 74 U/L / ALT: 36 U/L / AST: 28 U/L / GGT: x           Urinalysis Basic - ( 06 Oct 2018 01:57 )    Color: Yellow / Appearance: very cloudy / S.015 / pH: x  Gluc: x / Ketone: Trace  / Bili: Negative / Urobili: Negative mg/dL   Blood: x / Protein: 500 mg/dL / Nitrite: Negative   Leuk Esterase: Moderate / RBC: 0-2 /HPF / WBC TNTC /HPF   Sq Epi: x / Non Sq Epi: Negative / Bacteria: Few        MEDICATIONS  (STANDING):  allopurinol 100 milliGRAM(s) Oral daily  aspirin  chewable 81 milliGRAM(s) Oral daily  buDESOnide   0.5 milliGRAM(s) Respule 0.5 milliGRAM(s) Inhalation two times a day  diltiazem    Tablet 30 milliGRAM(s) Oral every 6 hours  doxazosin 8 milliGRAM(s) Oral at bedtime  finasteride 5 milliGRAM(s) Oral daily  heparin  Injectable 5000 Unit(s) SubCutaneous every 8 hours  simvastatin 10 milliGRAM(s) Oral at bedtime    MEDICATIONS  (PRN):  acetaminophen   Tablet .. 650 milliGRAM(s) Oral every 6 hours PRN Temp greater or equal to 38C (100.4F), Mild Pain (1 - 3)  ondansetron Injectable 4 milliGRAM(s) IV Push every 6 hours PRN Nausea      RADIOLOGY & ADDITIONAL TESTS: CC: Fever and palpitations (06 Oct 2018 03:43)    HPI:  82 y/o M PMHx significant for CAD s/p stents, AFib, diastolic CHF, hx of upper GI bleeding, PVD, hypertension, hypertension, severe COPD (O2 dependent), SHAYY on bipap at night, ESRD on HD (MWF), recurrent c. diff who was BIBA from dialysis ([facility]) where he was found to have a post-dialysis fever associated w/ tachycardia. In triage => /min, Tmax 102.7'F. Labs => LA 1.2, BUN/Cr 27/1.59, Alb 2.7, Phos 4.2. UA (+), RVP (-). CT ABD/Pelvis => Trace pericolonic stranding at the level of proximal to mid descending colon. Clinical correlation is advised to assess for mild colitis. No significant wall thickening. Moderate fecal burden. No evidence of small bowel obstruction or extraluminal collection. Re-demonstrated incompletely characterize right hepatic lobe hypodensity during 3 x 1.6 cm for which nonemergent liver protocol MR is advised provided no contraindications. Diffuse bladder wall thickening with trace perivesicular stranding. Correlate with urinalysis to assess for cystitis in appropriate clinical setting. Consider nonemergent retrograde evaluation is concern for bladder malignancy. Uncomplicated cholelithiasis. In the ED the patient was given Ulvankiy5g IVPB x 1, Siyqutrpbe8i IVPB x 1, NS 1.5L x 1. (06 Oct 2018 03:43)    INTERVAL HPI/ OVERNIGHT EVENTS: Chart reviewed, Pt was seen and examined,  confirms history above, states feels better since he was admitted, no further chills and fever, no abd pain, no dysuria, no diarrhea. Pt reports that follows up with wound care and Dr Clement for his chronic LLE ulcers.     Vital Signs Last 24 Hrs  T(C): 36.9 (06 Oct 2018 08:23), Max: 39.3 (05 Oct 2018 20:02)  T(F): 98.4 (06 Oct 2018 08:23), Max: 102.7 (05 Oct 2018 20:02)  HR: 85 (06 Oct 2018 07:40) (85 - 141)  BP: 143/48 (06 Oct 2018 07:40) (116/59 - 151/70)  RR: 19 (06 Oct 2018 07:40) (16 - 19)  SpO2: 94% (06 Oct 2018 07:40) (92% - 100%)      REVIEW OF SYSTEMS:  All other review of systems is negative unless indicated above.      PHYSICAL EXAM:  General: Well developed; well nourished; in no acute distress  Eyes: PERRLA, EOMI; conjunctiva and sclera clear  Head: Normocephalic; atraumatic  ENMT: No nasal discharge; airway clear  Neck: Supple; non tender; no masses  Respiratory: Good air entry  No wheezes, rales or rhonchi  Cardiovascular: Regular rate and rhythm. S1 and S2 Normal; No murmurs  Gastrointestinal: Soft non-tender non-distended; Normal bowel sounds  Genitourinary: No costovertebral angle tenderness  Extremities: No edema, S/p R AKA  Vascular: Peripheral pulses diminished, LLE  Neurological: Alert and oriented x4  Skin: Warm and dry. No acute rash. Multiple LLE ulcers with polymem dressing, anterior ulcer with min exudate  Lymph Nodes: No acute cervical adenopathy  Musculoskeletal: Normal muscle tone, without deformities  Psychiatric: Cooperative and appropriate        LABS:                     10.4   4.83  )-----------( 143      ( 05 Oct 2018 20:40 )             33.1     05 Oct 2018 20:40    139    |  106    |  27     ----------------------------<  105    3.5     |  23     |  1.59     Ca    8.2        05 Oct 2018 20:40    TPro  6.5    /  Alb  2.7    /  TBili  0.3    /  DBili  x      /  AST  28     /  ALT  36     /  AlkPhos  74     05 Oct 2018 20:40    PT/INR - ( 05 Oct 2018 20:40 )   PT: 12.3 sec;   INR: 1.14 ratio         PTT - ( 05 Oct 2018 20:40 )  PTT:59.5 sec  CAPILLARY BLOOD GLUCOSE        LIVER FUNCTIONS - ( 05 Oct 2018 20:40 )  Alb: 2.7 g/dL / Pro: 6.5 gm/dL / ALK PHOS: 74 U/L / ALT: 36 U/L / AST: 28 U/L / GGT: x           Urinalysis Basic - ( 06 Oct 2018 01:57 )    Color: Yellow / Appearance: very cloudy / S.015 / pH: x  Gluc: x / Ketone: Trace  / Bili: Negative / Urobili: Negative mg/dL   Blood: x / Protein: 500 mg/dL / Nitrite: Negative   Leuk Esterase: Moderate / RBC: 0-2 /HPF / WBC TNTC /HPF   Sq Epi: x / Non Sq Epi: Negative / Bacteria: Few    Culture - Urine (18 @ 06:00)    Specimen Source: .Urine Clean Catch (Midstream)    Culture Results:   Culture grew 3 or more types of organisms which indicate  collection contamination; consider recollection only if clinically  indicated.      MEDICATIONS  (STANDING):  allopurinol 100 milliGRAM(s) Oral daily  aspirin  chewable 81 milliGRAM(s) Oral daily  buDESOnide   0.5 milliGRAM(s) Respule 0.5 milliGRAM(s) Inhalation two times a day  cefepime  Injectable. 1000 milliGRAM(s) IV Push daily  diltiazem    Tablet 30 milliGRAM(s) Oral every 6 hours  doxazosin 8 milliGRAM(s) Oral at bedtime  finasteride 5 milliGRAM(s) Oral daily  heparin  Injectable 5000 Unit(s) SubCutaneous every 8 hours  potassium chloride    Tablet ER 20 milliEquivalent(s) Oral once  simvastatin 10 milliGRAM(s) Oral at bedtime  vancomycin    Solution 125 milliGRAM(s) Oral every 6 hours    MEDICATIONS  (PRN):  acetaminophen   Tablet .. 650 milliGRAM(s) Oral every 6 hours PRN Temp greater or equal to 38C (100.4F), Mild Pain (1 - 3)  ondansetron Injectable 4 milliGRAM(s) IV Push every 6 hours PRN Nausea      RADIOLOGY & ADDITIONAL TESTS:    EXAM:  CT ABDOMEN AND PELVIS                        PROCEDURE DATE:  10/05/2018      FINDINGS:    Absence of intravenous contrast limits evaluation for focal lesions,   neoplasm, and vascular pathology.    Lower Thorax: No consolidation or effusion. Incompletely characterized   oblong right lower lobe opacity which nonemergent CT chest follow-up is   advised for better characterization.    Liver: Redemonstrated incompletely characterize right hepatic lobe   hypodensity during 3 x 1.6 cm on image 19 of series 2 for which   nonemergent liver protocol MR is advised provided no contraindications.  Biliary: No dilatation. Uncomplicated cholelithiasis.  Spleen: No suspicious lesions.      Pancreas: No inflammatory changes or ductal dilatation.      Adrenals: Normal.      Kidneys: No hydronephrosis. Stable scattered renal hypodense and   hyperdense lesions.  Vessels: Normal caliber. Atherosclerotic disease of the aorta and its   branches with associated diffuse dense vascular calcifications.   Infrarenal IVC filter noted. Vascular surgical clips are seen in the   right groin region. Right external iliac artery vascular stent identified.    GI tract: Gastric clips again noted at the fundus. Trace pericolonic   stranding at the level of proximal to mid descending colon. Clinical   correlation is advised to assess for mild colitis.No significant wall   thickening. Moderate fecal burden. No evidence of small bowel   obstruction. Normal-appearing appendix.    Peritoneum/retroperitoneum and mesentery: No free air. No organized fluid   collection. No adenopathy.        Pelvic organs/Bladder: Diffuse bladder wall thickening with trace   perivesicular stranding. Correlate with urinalysis to assess for cystitis   in appropriate clinical setting. Consider nonemergent retrograde   evaluation is concern for bladder malignancy. Heterogenous prostate   containing calcifications.    Abdominal wall: A small umbilical hernia containing portion of   obstructive bowelloop noted.  Bones and soft tissues: Multilevel degenerative changes of the spine   noted stable mild compression deformity of T12 vertebral body.   Degenerative changes of bilateral hip joints with nonspecific lucencies   in the right femoral head. Recommend clinical correlation to assess for   avascular necrosis.    IMPRESSION:    Trace pericolonic stranding at the level of proximal to mid descending   colon. Clinical correlation is advised to assess for mild colitis.No   significant wall thickening. Moderate fecal burden. No evidence of small   bowel obstruction or extraluminal collection.     Redemonstrated incompletely characterize right hepatic lobe hypodensity   during 3 x 1.6 cm for which nonemergent liver protocol MR is advised   provided no contraindications.    Diffuse bladder wall thickening with trace perivesicular stranding.   Correlate with urinalysis to assess for cystitis in appropriate clinical   setting. Consider nonemergent retrograde evaluation is concern for   bladder malignancy.    Uncomplicated cholelithiasis.

## 2018-10-06 NOTE — CONSULT NOTE ADULT - CONSULT REASON
fevers Detail Level: Simple General Sunscreen Counseling: I recommended a broad spectrum sunscreen with a SPF of 30 or higher.  I explained that SPF 30 sunscreens block approximately 97 percent of the sun's harmful rays.  Sunscreens should be applied at least 15 minutes prior to expected sun exposure and then every 2 hours after that as long as sun exposure continues. If swimming or exercising sunscreen should be reapplied every 45 minutes to an hour after getting wet or sweating.  One ounce, or the equivalent of a shot glass full of sunscreen, is adequate to protect the skin not covered by a bathing suit. I also recommended a lip balm with a sunscreen as well. Sun protective clothing can be used in lieu of sunscreen but must be worn the entire time you are exposed to the sun's rays.

## 2018-10-06 NOTE — CONSULT NOTE ADULT - ASSESSMENT
hypertension, hypertension, severe COPD (O2 dependent), SHAYY on bipap at night, ESRD on HD (MWF), recurrent c. diff who was BIBA from dialysis ([facility]) where he was found to have a post-dialysis fever associated w/ tachycardia. In triage => /min, Tmax 102.7'F. Labs => LA 1.2, BUN/Cr 27/1.59, Alb 2.7, Phos 4.2. UA (+), RVP (-). CT ABD/Pelvis => Trace pericolonic stranding at the level of proximal to mid descending colon. Clinical correlation is advised to assess for mild colitis. No significant wall thickening. Moderate fecal burden. No evidence of small bowel obstruction or extraluminal collection. Re-demonstrated incompletely characterize right hepatic lobe hypodensity during 3 x 1.6 cm for which nonemergent liver protocol MR is advised provided no contraindications. Diffuse bladder wall thickening with trace perivesicular stranding. Correlate with urinalysis to assess for cystitis in appropriate clinical setting. Consider nonemergent retrograde evaluation is concern for bladder malignancy. Uncomplicated cholelithiasis. In the ED the patient was given Wkairmid2u IVPB x 1, Vvvqqvfglm3y IVPB x 1, NS 1.5L x 1.    Febrile syndrome with change in frequency of urination.  I agree with awaiting culture data, and antibiotics.  Patient with likely cystitis/urinary tract infection as cause of his symptoms.  Antibiotics    No evidence of active C. difficile at this time although patient is at high risk of recurrent C. difficile. Would empirically treat with vancomycin 4 times a day for the duration that he is on antibiotics and for 1-2 weeks afterwards.    Coronary artery disease, atrial fibrillation–rate control. Continue aspirin.  Anticoagulation was held in the past secondary to significant episodes of GI bleeding.    End-stage renal disease–continue hemodialysis

## 2018-10-06 NOTE — H&P ADULT - NSHPSOCIALHISTORY_GEN_ALL_CORE
Tobacco: smoked 1ppd X 50 years, quit over 20 years ago  Rare ETOH use   Tobacco: smoked 1ppd X 50 years, quit over 20 years ago  Rare ETOH use    Lives at [facility]

## 2018-10-06 NOTE — CONSULT NOTE ADULT - ASSESSMENT
84 y/o M PMHx significant for CAD s/p stents, AFib, diastolic CHF, hx of upper GI bleeding, PVD, hypertension, hypertension, severe COPD (O2 dependent), SHAYY on bipap at night, ESRD on HD (MWF), recurrent c. diff who was BIBA from dialysis ([facility]) where he was found to have a post-dialysis fever associated w/ tachycardia. In triage => /min, Tmax 102.7'F. Labs => LA 1.2, BUN/Cr 27/1.59, Alb 2.7, Phos 4.2. UA (+), RVP (-). CT ABD/Pelvis => Trace pericolonic stranding at the level of proximal to mid descending colon. Clinical correlation is advised to assess for mild colitis.  Diffuse bladder wall thickening with trace perivesicular stranding. in the ED the patient was given Bmriloau7w IVPB x 1, Zjsvrjmkka2w IVPB x 1, NS 1.5L x 1.     1. febrile syndrome/cystitis/hx of CDAD/ESRD  - agree with cefepime 1gm daily for gnr resistant coverage  - fu urine cx/blood cx  - start oral vancomycin [id number] for c diff prophylaxis  - not having any diarrhea and formed BMS, can dc contact precautions  - do not check c diff pcr/gi pcr at this time  - monitor temps  - tolerating abx well so far; no side effects noted  - reason for abx use and side effects reviewed with patient  - supportive care  - f/u cbc

## 2018-10-06 NOTE — CONSULT NOTE ADULT - SUBJECTIVE AND OBJECTIVE BOX
Patient is a 83y old  Male who presents with a chief complaint of Fever and palpitations (06 Oct 2018 11:03)    HPI:  82 y/o M PMHx significant for CAD s/p stents, AFib, diastolic CHF, hx of upper GI bleeding, PVD, hypertension, hypertension, severe COPD (O2 dependent), SHAYY on bipap at night, ESRD on HD (MWF), recurrent c. diff who was BIBA from dialysis ([facility]) where he was found to have a post-dialysis fever associated w/ tachycardia. In triage => /min, Tmax 102.7'F. Labs => LA 1.2, BUN/Cr 27/1.59, Alb 2.7, Phos 4.2. UA (+), RVP (-). CT ABD/Pelvis => Trace pericolonic stranding at the level of proximal to mid descending colon. Clinical correlation is advised to assess for mild colitis.  Diffuse bladder wall thickening with trace perivesicular stranding. in the ED the patient was given Ltwpeldx0b IVPB x 1, Azfaihdmoz1o IVPB x 1, NS 1.5L x 1.       PMH: as above  PSH: as above  Meds: per reconciliation sheet, noted below  MEDICATIONS  (STANDING):  allopurinol 100 milliGRAM(s) Oral daily  aspirin  chewable 81 milliGRAM(s) Oral daily  buDESOnide   0.5 milliGRAM(s) Respule 0.5 milliGRAM(s) Inhalation two times a day  cefepime   IVPB 1000 milliGRAM(s) IV Intermittent daily  diltiazem    Tablet 30 milliGRAM(s) Oral every 6 hours  doxazosin 8 milliGRAM(s) Oral at bedtime  finasteride 5 milliGRAM(s) Oral daily  heparin  Injectable 5000 Unit(s) SubCutaneous every 8 hours  simvastatin 10 milliGRAM(s) Oral at bedtime  vancomycin    Solution 125 milliGRAM(s) Oral every 6 hours    Allergies    Zosyn (Rash)    Intolerances      Social: no smoking, no alcohol, no illegal drugs; no recent travel, no exposure to TB  FAMILY HISTORY:  Family history of colorectal cancer (Father)  Family history of diabetes mellitus (Mother, Sibling)  Family history of hypertension (Mother)     no history of premature cardiovascular disease in first degree relatives    ROS: no HA, no dizziness, no sore throat, no blurry vision, no CP, no palpitations, no SOB, no cough, no diarrhea, no N/V,no leg pain, no claudication, no rash, no joint aches, no rectal pain or bleeding, no night sweats  All other systems reviewed and are negative    Vital Signs Last 24 Hrs  T(C): 36.9 (06 Oct 2018 08:23), Max: 39.3 (05 Oct 2018 20:02)  T(F): 98.4 (06 Oct 2018 08:23), Max: 102.7 (05 Oct 2018 20:02)  HR: 85 (06 Oct 2018 07:40) (85 - 141)  BP: 143/48 (06 Oct 2018 07:40) (116/59 - 151/70)  BP(mean): --  RR: 19 (06 Oct 2018 07:40) (16 - 19)  SpO2: 94% (06 Oct 2018 07:40) (92% - 100%)  Daily Height in cm: 170.18 (05 Oct 2018 20:02)    Daily     PE:  Constitutional: frail looking  HEENT: NC/AT, EOMI, PERRLA, conjunctivae clear; ears and nose atraumatic; pharynx benign  Neck: supple; thyroid not palpable  Back: no tenderness  Respiratory: respiratory effort normal; clear to auscultation  Cardiovascular: S1S2 regular, no murmurs  Abdomen: soft,  tender on deep palpation, not distended, positive BS; liver and spleen WNL  Genitourinary: no suprapubic tenderness  Lymphatic: no LN palpable  Musculoskeletal: no muscle tenderness, no joint swelling or tenderness  Extremities: R ext s/p BKA, L foot in brace  Neurological/ Psychiatric:   moving all extremities  Skin: no rashes; no palpable lesions    Labs: all available labs reviewed                        10.4   4.83  )-----------( 143      ( 05 Oct 2018 20:40 )             33.1     10-    139  |  106  |  27<H>  ----------------------------<  105<H>  3.5   |  23  |  1.59<H>    Ca    8.2<L>      05 Oct 2018 20:40    TPro  6.5  /  Alb  2.7<L>  /  TBili  0.3  /  DBili  x   /  AST  28  /  ALT  36  /  AlkPhos  74  10-05     LIVER FUNCTIONS - ( 05 Oct 2018 20:40 )  Alb: 2.7 g/dL / Pro: 6.5 gm/dL / ALK PHOS: 74 U/L / ALT: 36 U/L / AST: 28 U/L / GGT: x           Urinalysis Basic - ( 06 Oct 2018 01:57 )    Color: Yellow / Appearance: very cloudy / S.015 / pH: x  Gluc: x / Ketone: Trace  / Bili: Negative / Urobili: Negative mg/dL   Blood: x / Protein: 500 mg/dL / Nitrite: Negative   Leuk Esterase: Moderate / RBC: 0-2 /HPF / WBC TNTC /HPF   Sq Epi: x / Non Sq Epi: Negative / Bacteria: Few          Radiology: all available radiological tests reviewed  < from: Xray Chest 1 View-PORTABLE IMMEDIATE (10.05.18 @ 21:16) >  EXAM:  XR CHEST PORTABLE IMMED 1V                            PROCEDURE DATE:  10/05/2018          INTERPRETATION:  History: Sepsis dyspnea    Chest:  one view.      Comparison: 2018    AP radiograph of the chest demonstrates no evidence of infiltrate,   pleural effusion or vascular congestion. No atelectasis is seen. RIGHT   central venous catheter tip in SVC. The cardiac silhouette is normal in   size. Vascular calcification involves the aorta. Osseous structures are   intact.    Impression: No active pulmonary disease.        < end of copied text >    < from: CT Abdomen and Pelvis No Cont (10.05.18 @ 21:36) >    EXAM:  CT ABDOMEN AND PELVIS                            PROCEDURE DATE:  10/05/2018          INTERPRETATION:  CT ABDOMEN AND PELVIS WITHOUT CONTRAST    INDICATION: Sepsis and abdominal distention.    TECHNIQUE: Abdominopelvic CT without intravenouscontrast.Images are   reformatted in the sagittal and coronal planes.    COMPARISON: CT abdomen pelvis 2018.    FINDINGS:    Absence of intravenous contrast limits evaluation for focal lesions,   neoplasm, and vascular pathology.    Lower Thorax: No consolidation or effusion. Incompletely characterized   oblong right lower lobe opacity which nonemergent CT chest follow-up is   advised for better characterization.    Liver: Redemonstrated incompletely characterize right hepatic lobe   hypodensity during 3 x 1.6 cm on image 19 of series 2 for which   nonemergent liver protocol MR is advised provided no contraindications.  Biliary: No dilatation. Uncomplicated cholelithiasis.  Spleen: No suspicious lesions.      Pancreas: No inflammatory changes or ductal dilatation.      Adrenals: Normal.      Kidneys: No hydronephrosis. Stable scattered renal hypodense and   hyperdense lesions.  Vessels: Normal caliber. Atherosclerotic disease of the aorta and its   branches with associated diffuse dense vascular calcifications.   Infrarenal IVC filter noted. Vascular surgical clips are seen in the   right groin region. Right external iliac artery vascular stent identified.    GI tract: Gastric clips again noted at the fundus. Trace pericolonic   stranding at the level of proximal to mid descending colon. Clinical   correlation is advised to assess for mild colitis.No significant wall   thickening. Moderate fecal burden. No evidence of small bowel   obstruction. Normal-appearing appendix.    Peritoneum/retroperitoneum and mesentery: No free air. No organized fluid   collection. No adenopathy.        Pelvic organs/Bladder: Diffuse bladder wall thickening with trace   perivesicular stranding. Correlate with urinalysis to assess for cystitis   in appropriate clinical setting. Consider nonemergent retrograde   evaluation is concern for bladder malignancy. Heterogenous prostate   containing calcifications.    Abdominal wall: A small umbilical hernia containing portion of   obstructive bowelloop noted.  Bones and soft tissues: Multilevel degenerative changes of the spine   noted stable mild compression deformity of T12 vertebral body.   Degenerative changes of bilateral hip joints with nonspecific lucencies   in the right femoral head. Recommend clinical correlation to assess for   avascular necrosis.    IMPRESSION:    Trace pericolonic stranding at the level of proximal to mid descending   colon. Clinical correlation is advised to assess for mild colitis.No   significant wall thickening. Moderate fecal burden. No evidence of small   bowel obstruction or extraluminal collection.     Redemonstrated incompletely characterize right hepatic lobe hypodensity   during 3 x 1.6 cm for which nonemergent liver protocol MR is advised   provided no contraindications.    Diffuse bladder wall thickening with trace perivesicular stranding.   Correlate with urinalysis to assess for cystitis in appropriate clinical   setting. Consider nonemergent retrograde evaluation is concern for   bladder malignancy.    Uncomplicated cholelithiasis.    Additional findings as detailed above.      Advanced directives addressed: full resuscitation

## 2018-10-06 NOTE — H&P ADULT - FAMILY HISTORY
No pertinent family history in first degree relatives Mother  Still living? Unknown  Family history of hypertension, Age at diagnosis: Age Unknown  Family history of diabetes mellitus, Age at diagnosis: Age Unknown     Sibling  Still living? Unknown  Family history of diabetes mellitus, Age at diagnosis: Age Unknown     Father  Still living? Unknown  Family history of colorectal cancer, Age at diagnosis: Age Unknown

## 2018-10-06 NOTE — CONSULT NOTE ADULT - SUBJECTIVE AND OBJECTIVE BOX
Patient is a 83y old  Male who presents with a chief complaint of Fever and palpitations (06 Oct 2018 10:22)      HPI:  84 y/o M PMHx significant for CAD s/p stents, AFib, diastolic CHF, hx of upper GI bleeding, PVD, hypertension, hypertension, severe COPD (O2 dependent), SHAYY on bipap at night, ESRD on HD (MWF), recurrent c. diff who was BIBA from dialysis ([facility]) where he was found to have a post-dialysis fever associated w/ tachycardia. In triage => /min, Tmax 102.7'F. Labs => LA 1.2, BUN/Cr 27/1.59, Alb 2.7, Phos 4.2. UA (+), RVP (-). CT ABD/Pelvis => Trace pericolonic stranding at the level of proximal to mid descending colon. Clinical correlation is advised to assess for mild colitis. No significant wall thickening. Moderate fecal burden. No evidence of small bowel obstruction or extraluminal collection. Re-demonstrated incompletely characterize right hepatic lobe hypodensity during 3 x 1.6 cm for which nonemergent liver protocol MR is advised provided no contraindications. Diffuse bladder wall thickening with trace perivesicular stranding. Correlate with urinalysis to assess for cystitis in appropriate clinical setting. Consider nonemergent retrograde evaluation is concern for bladder malignancy. Uncomplicated cholelithiasis. In the ED the patient was given Rtvrgjfm3p IVPB x 1, Sxcvzrpopt1a IVPB x 1, NS 1.5L x 1. (06 Oct 2018 03:43)    Patient with recent history of severe C. difficile and recently stopped vancomycin taper.  Has noted that over the last day or so, his urine frequency has increased. Ordinarily, he has 1-2 urinations a day and is on hemodialysis however, had 5 episodes of urination overnight, night prior to admission.  History is per patient and family.  Stool has been formed and he has not been having any diarrhea.  Negative chest pain.  He has been doing well with rehabilitation and has learned to transfer from bed to wheelchair by himself.        PAST MEDICAL & SURGICAL HISTORY:  Above knee amputation of right lower extremity  Recurrent Clostridium difficile diarrhea  Diastolic CHF  Peripheral vascular disease  Afib  Anemia  CKD (chronic kidney disease)  COPD (chronic obstructive pulmonary disease)  SHAYY (obstructive sleep apnea)  Sepsis, due to unspecified organism: 2/2 poorly healing wounds b/l  Dyspepsia: On moderate exertion.  Sleep apnea, obstructive: Requires home 02 therapy, and treatment with BIPAP  Atelectasis  Pleural effusion, bilateral  Respiratory failure  Peripheral edema  CRI (chronic renal insufficiency)  Gout  Benign prostatic hypertrophy  Spinal stenosis  Hypercholesterolemia  GERD (gastroesophageal reflux disease)  CAD (coronary artery disease)  Hypertension  S/P angioplasty with stent  Cataract of left eye  Prostate: Surgery green light procedure.  S/P rotator cuff surgery: Right  S/P angioplasty      MEDICATIONS  (STANDING):  allopurinol 100 milliGRAM(s) Oral daily  aspirin  chewable 81 milliGRAM(s) Oral daily  buDESOnide   0.5 milliGRAM(s) Respule 0.5 milliGRAM(s) Inhalation two times a day  diltiazem    Tablet 30 milliGRAM(s) Oral every 6 hours  doxazosin 8 milliGRAM(s) Oral at bedtime  finasteride 5 milliGRAM(s) Oral daily  heparin  Injectable 5000 Unit(s) SubCutaneous every 8 hours  simvastatin 10 milliGRAM(s) Oral at bedtime    MEDICATIONS  (PRN):  acetaminophen   Tablet .. 650 milliGRAM(s) Oral every 6 hours PRN Temp greater or equal to 38C (100.4F), Mild Pain (1 - 3)  ondansetron Injectable 4 milliGRAM(s) IV Push every 6 hours PRN Nausea      Allergies    Zosyn (Rash)    Intolerances        SOCIAL HISTORY:came out of rehab and has been transferring on own    FAMILY HISTORY:  Family history of colorectal cancer (Father)  Family history of diabetes mellitus (Mother, Sibling)  Family history of hypertension (Mother)      REVIEW OF SYSTEMS:    CONSTITUTIONAL: No weakness, chills  EYES/ENT: No visual changes;  No vertigo or throat pain   NECK: No pain or stiffness  RESPIRATORY: No cough, wheezing, hemoptysis; No shortness of breath  CARDIOVASCULAR: No chest pain or palpitations  GENITOURINARY: No dysuria, frequency or hematuria  NEUROLOGICAL: No numbness or weakness  SKIN: No itching, burning, rashes, or lesions   All other review of systems is negative unless indicated above.    Vital Signs Last 24 Hrs  T(C): 36.9 (06 Oct 2018 08:23), Max: 39.3 (05 Oct 2018 20:02)  T(F): 98.4 (06 Oct 2018 08:23), Max: 102.7 (05 Oct 2018 20:02)  HR: 85 (06 Oct 2018 07:40) (85 - 141)  BP: 143/48 (06 Oct 2018 07:40) (116/59 - 151/70)  BP(mean): --  RR: 19 (06 Oct 2018 07:40) (16 - 19)  SpO2: 94% (06 Oct 2018 07:40) (92% - 100%)    PHYSICAL EXAM:    Constitutional: NAD, well-developed  HEENT: EOMI, throat clear  Neck: No LAD, supple  Respiratory: CTA and P  Cardiovascular: S1 and S2, RRR, no M  Gastrointestinal: BS+, soft, NT/ND, neg HSM,  Extremities: No peripheral edema, neg clubing, cyanosis    Neurological: A/O x 3, no focal deficits  Psychiatric: Normal mood, normal affect  Skin: No rashes    LABS:  CBC Full  -  ( 05 Oct 2018 20:40 )  WBC Count : 4.83 K/uL  Hemoglobin : 10.4 g/dL  Hematocrit : 33.1 %  Platelet Count - Automated : 143 K/uL  Mean Cell Volume : 94.0 fl  Mean Cell Hemoglobin : 29.5 pg  Mean Cell Hemoglobin Concentration : 31.4 gm/dL  Auto Neutrophil # : 3.81 K/uL  Auto Lymphocyte # : 0.35 K/uL  Auto Monocyte # : 0.51 K/uL  Auto Eosinophil # : 0.10 K/uL  Auto Basophil # : 0.03 K/uL  Auto Neutrophil % : 78.9 %  Auto Lymphocyte % : 7.2 %  Auto Monocyte % : 10.6 %  Auto Eosinophil % : 2.1 %  Auto Basophil % : 0.6 %    10-05    139  |  106  |  27<H>  ----------------------------<  105<H>  3.5   |  23  |  1.59<H>    Ca    8.2<L>      05 Oct 2018 20:40    TPro  6.5  /  Alb  2.7<L>  /  TBili  0.3  /  DBili  x   /  AST  28  /  ALT  36  /  AlkPhos  74  10-05    PT/INR - ( 05 Oct 2018 20:40 )   PT: 12.3 sec;   INR: 1.14 ratio         PTT - ( 05 Oct 2018 20:40 )  PTT:59.5 sec        RADIOLOGY & ADDITIONAL STUDIES:  < from: CT Abdomen and Pelvis No Cont (10.05.18 @ 21:36) >  EXAM:  CT ABDOMEN AND PELVIS                            PROCEDURE DATE:  10/05/2018          INTERPRETATION:  CT ABDOMEN AND PELVIS WITHOUT CONTRAST    INDICATION: Sepsis and abdominal distention.    TECHNIQUE: Abdominopelvic CT without intravenouscontrast.Images are   reformatted in the sagittal and coronal planes.    COMPARISON: CT abdomen pelvis 7/21/2018.    FINDINGS:    Absence of intravenous contrast limits evaluation for focal lesions,   neoplasm, and vascular pathology.    Lower Thorax: No consolidation or effusion. Incompletely characterized   oblong right lower lobe opacity which nonemergent CT chest follow-up is   advised for better characterization.    Liver: Redemonstrated incompletely characterize right hepatic lobe   hypodensity during 3 x 1.6 cm on image 19 of series 2 for which   nonemergent liver protocol MR is advised provided no contraindications.  Biliary: No dilatation. Uncomplicated cholelithiasis.  Spleen: No suspicious lesions.      Pancreas: No inflammatory changes or ductal dilatation.      Adrenals: Normal.      Kidneys: No hydronephrosis. Stable scattered renal hypodense and   hyperdense lesions.  Vessels: Normal caliber. Atherosclerotic disease of the aorta and its   branches with associated diffuse dense vascular calcifications.   Infrarenal IVC filter noted. Vascular surgical clips are seen in the   right groin region. Right external iliac artery vascular stent identified.    GI tract: Gastric clips again noted at the fundus. Trace pericolonic   stranding at the level of proximal to mid descending colon. Clinical   correlation is advised to assess for mild colitis.No significant wall   thickening. Moderate fecal burden. No evidence of small bowel   obstruction. Normal-appearing appendix.    Peritoneum/retroperitoneum and mesentery: No free air. No organized fluid   collection. No adenopathy.        Pelvic organs/Bladder: Diffuse bladder wall thickening with trace   perivesicular stranding. Correlate with urinalysis to assess for cystitis   in appropriate clinical setting. Consider nonemergent retrograde   evaluation is concern for bladder malignancy. Heterogenous prostate   containing calcifications.    Abdominal wall: A small umbilical hernia containing portion of   obstructive bowelloop noted.  Bones and soft tissues: Multilevel degenerative changes of the spine   noted stable mild compression deformity of T12 vertebral body.   Degenerative changes of bilateral hip joints with nonspecific lucencies   in the right femoral head. Recommend clinical correlation to assess for   avascular necrosis.    IMPRESSION:    Trace pericolonic stranding at the level of proximal to mid descending   colon. Clinical correlation is advised to assess for mild colitis.No   significant wall thickening. Moderate fecal burden. No evidence of small   bowel obstruction or extraluminal collection.     Redemonstrated incompletely characterize right hepatic lobe hypodensity   during 3 x 1.6 cm for which nonemergent liver protocol MR is advised   provided no contraindications.    Diffuse bladder wall thickening with trace perivesicular stranding.   Correlate with urinalysis to assess for cystitis in appropriate clinical   setting. Consider nonemergent retrograde evaluation is concern for   bladder malignancy.    Uncomplicated cholelithiasis.    Additional findings as detailed above.

## 2018-10-06 NOTE — CHART NOTE - NSCHARTNOTEFT_GEN_A_CORE
neb tx given w.5mg pulmicort udx10 min. bs are diminished and clear pre/post tx. hr=86/87. vida tx ell via mask

## 2018-10-06 NOTE — ED ADULT NURSE REASSESSMENT NOTE - NS ED NURSE REASSESS COMMENT FT1
Patient care received from FARHAD HOOD. Patient currently sleeping comfortably in bed, no s/s of distress present. VSS. Contact precautions maintained, safety & comfort measures in place. Will continue to monitor.
Patient now awake, resting comfortably in bed. 100.1 temp orally, ST per tele monitor; all other VSS. Tylenol administered as prescribed. Plan of care discussed, abx as ordered. Contact precautions discontinued per MD Falnagan, no diarrhea per patient. No s/s of distress present. Patient will be transferred to  after giving report to receiving RN Krystal, awaiting call-back. Safety & comfort measures in place, family bedside. Purposefu active rounding every hour on my time.
Patient resting comfortably, no s/s of distress present. Dose request sent to pharmacy for PO vanco, medication not stocked. Hand-off report to RN Sabi, awaiting transport to . Safety & comfort measures in place, hourly rounding on my time.
attempt to collect urine sample. pt is dialysis pt and is urinary incontinent. per pt's daughter, since they reduced his dialysis days from 3 to 2, he has been able to use urinal. pt was not able to give a sample. attempt at straight catheter to obtain sample also unsuccessful. will continue to monitor.

## 2018-10-06 NOTE — ED ADULT NURSE NOTE - NSIMPLEMENTINTERV_GEN_ALL_ED
Implemented All Fall Risk Interventions:  [place] to call system. Call bell, personal items and telephone within reach. Instruct patient to call for assistance. Room bathroom lighting operational. Non-slip footwear when patient is off stretcher. Physically safe environment: no spills, clutter or unnecessary equipment. Stretcher in lowest position, wheels locked, appropriate side rails in place. Provide visual cue, wrist band, yellow gown, etc. Monitor gait and stability. Monitor for mental status changes and reorient to person, place, and time. Review medications for side effects contributing to fall risk. Reinforce activity limits and safety measures with patient and family.

## 2018-10-07 LAB
ANION GAP SERPL CALC-SCNC: 9 MMOL/L — SIGNIFICANT CHANGE UP (ref 5–17)
BUN SERPL-MCNC: 52 MG/DL — HIGH (ref 7–23)
CALCIUM SERPL-MCNC: 8.5 MG/DL — SIGNIFICANT CHANGE UP (ref 8.5–10.1)
CHLORIDE SERPL-SCNC: 112 MMOL/L — HIGH (ref 96–108)
CO2 SERPL-SCNC: 23 MMOL/L — SIGNIFICANT CHANGE UP (ref 22–31)
CREAT SERPL-MCNC: 2.87 MG/DL — HIGH (ref 0.5–1.3)
GLUCOSE SERPL-MCNC: 131 MG/DL — HIGH (ref 70–99)
HAV IGM SER-ACNC: SIGNIFICANT CHANGE UP
HBV CORE IGM SER-ACNC: SIGNIFICANT CHANGE UP
HBV SURFACE AG SER-ACNC: SIGNIFICANT CHANGE UP
HCT VFR BLD CALC: 32.4 % — LOW (ref 39–50)
HCV AB S/CO SERPL IA: 0.14 S/CO — SIGNIFICANT CHANGE UP
HCV AB SERPL-IMP: SIGNIFICANT CHANGE UP
HGB BLD-MCNC: 9.9 G/DL — LOW (ref 13–17)
MCHC RBC-ENTMCNC: 29.1 PG — SIGNIFICANT CHANGE UP (ref 27–34)
MCHC RBC-ENTMCNC: 30.6 GM/DL — LOW (ref 32–36)
MCV RBC AUTO: 95.3 FL — SIGNIFICANT CHANGE UP (ref 80–100)
NRBC # BLD: 0 /100 WBCS — SIGNIFICANT CHANGE UP (ref 0–0)
PLATELET # BLD AUTO: 142 K/UL — LOW (ref 150–400)
POTASSIUM SERPL-MCNC: 3.7 MMOL/L — SIGNIFICANT CHANGE UP (ref 3.5–5.3)
POTASSIUM SERPL-SCNC: 3.7 MMOL/L — SIGNIFICANT CHANGE UP (ref 3.5–5.3)
RBC # BLD: 3.4 M/UL — LOW (ref 4.2–5.8)
RBC # FLD: 15 % — HIGH (ref 10.3–14.5)
SODIUM SERPL-SCNC: 144 MMOL/L — SIGNIFICANT CHANGE UP (ref 135–145)
WBC # BLD: 4.93 K/UL — SIGNIFICANT CHANGE UP (ref 3.8–10.5)
WBC # FLD AUTO: 4.93 K/UL — SIGNIFICANT CHANGE UP (ref 3.8–10.5)

## 2018-10-07 RX ADMIN — Medication 125 MILLIGRAM(S): at 17:34

## 2018-10-07 RX ADMIN — FINASTERIDE 5 MILLIGRAM(S): 5 TABLET, FILM COATED ORAL at 11:48

## 2018-10-07 RX ADMIN — CEFEPIME 1000 MILLIGRAM(S): 1 INJECTION, POWDER, FOR SOLUTION INTRAMUSCULAR; INTRAVENOUS at 11:47

## 2018-10-07 RX ADMIN — Medication 125 MILLIGRAM(S): at 22:34

## 2018-10-07 RX ADMIN — Medication 100 MILLIGRAM(S): at 11:47

## 2018-10-07 RX ADMIN — Medication 125 MILLIGRAM(S): at 05:25

## 2018-10-07 RX ADMIN — Medication 81 MILLIGRAM(S): at 11:49

## 2018-10-07 RX ADMIN — SIMVASTATIN 10 MILLIGRAM(S): 20 TABLET, FILM COATED ORAL at 22:33

## 2018-10-07 RX ADMIN — Medication 125 MILLIGRAM(S): at 11:49

## 2018-10-07 RX ADMIN — HEPARIN SODIUM 5000 UNIT(S): 5000 INJECTION INTRAVENOUS; SUBCUTANEOUS at 05:25

## 2018-10-07 RX ADMIN — Medication 8 MILLIGRAM(S): at 22:33

## 2018-10-07 RX ADMIN — HEPARIN SODIUM 5000 UNIT(S): 5000 INJECTION INTRAVENOUS; SUBCUTANEOUS at 22:33

## 2018-10-07 RX ADMIN — HEPARIN SODIUM 5000 UNIT(S): 5000 INJECTION INTRAVENOUS; SUBCUTANEOUS at 14:00

## 2018-10-07 NOTE — PROGRESS NOTE ADULT - ASSESSMENT
hypertension, hypertension, severe COPD (O2 dependent), SHAYY on bipap at night, ESRD on HD (MWF), recurrent c. diff who was BIBA from dialysis ([facility]) where he was found to have a post-dialysis fever associated w/ tachycardia. In triage => /min, Tmax 102.7'F. Labs => LA 1.2, BUN/Cr 27/1.59, Alb 2.7, Phos 4.2. UA (+), RVP (-). CT ABD/Pelvis => Trace pericolonic stranding at the level of proximal to mid descending colon. Clinical correlation is advised to assess for mild colitis. No significant wall thickening. Moderate fecal burden. No evidence of small bowel obstruction or extraluminal collection. Re-demonstrated incompletely characterize right hepatic lobe hypodensity during 3 x 1.6 cm for which nonemergent liver protocol MR is advised provided no contraindications. Diffuse bladder wall thickening with trace perivesicular stranding. Correlate with urinalysis to assess for cystitis in appropriate clinical setting. Consider nonemergent retrograde evaluation is concern for bladder malignancy. Uncomplicated cholelithiasis. In the ED the patient was given Ysndiyze6o IVPB x 1, Cfecrrqamv0e IVPB x 1, NS 1.5L x 1.    vascular note appreciated  Febrile syndrome with change in frequency of urination.  I agree with awaiting culture data, and antibiotics.  Patient with likely cystitis/urinary tract infection as cause of his symptoms.  Antibiotics    No evidence of active C. difficile at this time although patient is at high risk of recurrent C. difficile. Would empirically treat with vancomycin 4 times a day for the duration that he is on antibiotics and for 1-2 weeks afterwards.    Coronary artery disease, atrial fibrillation–rate control. Continue aspirin.  Anticoagulation was held in the past secondary to significant episodes of GI bleeding.    End-stage renal disease–continue hemodialysis

## 2018-10-07 NOTE — PROGRESS NOTE ADULT - ASSESSMENT
84 y/o M PMHx significant for CAD s/p stents, AFib, diastolic CHF, hx of upper GI bleeding, PVD, hypertension, hypertension, severe COPD (O2 dependent), SHAYY on bipap at night, ESRD on HD (MW), recurrent c. diff who was admitted for:     1)Fevers due to probable  infection, likely cystitis  - fevers better   - leukocytosis better   - CT reviewed   - No diarrhea, off isolation   - F/u BCX and UCX   - C/w Kvrpxnhv7u IVPB x 1, Waxaqvrpeg6z IVPB x 1 in the ED  - Monitor for fevers   - IF and GI eval appreciated         2)CAD/AFib  -  rate control   - c/w  Diltiazem 30mg po q6h  - cont. ASA 81mg po daily    3)BPH  - c/w  Doxazosin 8mg po qHS  - c/w Dutasteride 0.5mg po qHS    4) COPD/SHAYY  - stable respiratory status   -CXR: neg   -cont. Budesonide nebs  -cont. supplemental O2 PRN   - BiPAP at night    5)ESRD  - C/w HD as per Renal     6) PAD, s/p R AKA, Chronic LLE ulcers   could be source of infection?  wound care eval  change dressing with Xeroform for now   Dr kailee toussaint       6)Vte ppx 82 y/o M PMHx significant for CAD s/p stents, AFib, diastolic CHF, hx of upper GI bleeding, PVD, hypertension, hypertension, severe COPD (O2 dependent), SHAYY on bipap at night, ESRD on HD (MW), recurrent c. diff who was admitted for:     1) Fevers due to Klebsiella  UTI/ cystitis  - fevers resolved   - leukocytosis better   - CT reviewed   - No diarrhea, off isolation   - F/u BCX: NGTD   - UCX: + Klebsiella   - C/w [id number]   - On PO Vanco for Cdiff PPxs       2)CAD/AFib  -  rate control   - c/w  Diltiazem 30mg po q6h  - cont. ASA 81mg po daily    3)BPH  - c/w  Doxazosin 8mg po qHS  - c/w Dutasteride 0.5mg po qHS    4) COPD/SHAYY  - stable respiratory status   -CXR: neg   -cont. Budesonide nebs  -cont. supplemental O2 PRN   - BiPAP at night    5)ESRD  - C/w HD as per Renal     6) PAD, s/p R AKA, Chronic LLE ulcers   wound care eval  wound orders as per SX   Dr kailee toussaint       6)Vte ppx

## 2018-10-07 NOTE — PROGRESS NOTE ADULT - SUBJECTIVE AND OBJECTIVE BOX
NEPHROLOGY CONSULT  HPI:  82 y/o M PMHx significant for CAD s/p stents, AFib, diastolic CHF, hx of upper GI bleeding, PVD, hypertension, hypertension, severe COPD (O2 dependent), SHAYY on bipap at night, ESRD on HD (MWF), recurrent c. diff who was BIBA from dialysis ([facility]) where he was found to have a post-dialysis fever associated w/ tachycardia. In triage => /min, Tmax 102.7'F. Labs => LA 1.2, BUN/Cr 27/1.59, Alb 2.7, Phos 4.2. UA (+), RVP (-). CT ABD/Pelvis => Trace pericolonic stranding at the level of proximal to mid descending colon. Clinical correlation is advised to assess for mild colitis. No significant wall thickening. Moderate fecal burden. No evidence of small bowel obstruction or extraluminal collection. Re-demonstrated incompletely characterize right hepatic lobe hypodensity during 3 x 1.6 cm for which nonemergent liver protocol MR is advised provided no contraindications. Diffuse bladder wall thickening with trace perivesicular stranding. Correlate with urinalysis to assess for cystitis in appropriate clinical setting. Consider nonemergent retrograde evaluation is concern for bladder malignancy. Uncomplicated cholelithiasis. In the ED the patient was given Ztmzkhui1h IVPB x 1, Nziajwjvov0o IVPB x 1, NS 1.5L x 1.   Seen in ER with daughter at bedside  chart reviewed case d/w HD nurses  possible UTI  blood cx done    10/7  Blood cx negative so far  urine cx pending  comfortable afebrile  OOB  good appetite      PAST MEDICAL & SURGICAL HISTORY:  Above knee amputation of right lower extremity  Recurrent Clostridium difficile diarrhea  Diastolic CHF  Peripheral vascular disease  Afib  Anemia  CKD (chronic kidney disease)  COPD (chronic obstructive pulmonary disease)  SHAYY (obstructive sleep apnea)  Sepsis, due to unspecified organism: 2/2 poorly healing wounds b/l  Dyspepsia: On moderate exertion.  Sleep apnea, obstructive: Requires home 02 therapy, and treatment with BIPAP  Atelectasis  Pleural effusion, bilateral  Respiratory failure  Peripheral edema  CRI (chronic renal insufficiency)  Gout  Benign prostatic hypertrophy  Spinal stenosis  Hypercholesterolemia  GERD (gastroesophageal reflux disease)  CAD (coronary artery disease)  Hypertension  S/P angioplasty with stent  Cataract of left eye  Prostate: Surgery green light procedure.  S/P rotator cuff surgery: Right  S/P angioplasty      FAMILY HISTORY:  Family history of colorectal cancer (Father)  Family history of diabetes mellitus (Mother, Sibling)  Family history of hypertension (Mother)      MEDICATIONS  (STANDING):  allopurinol 100 milliGRAM(s) Oral daily  aspirin  chewable 81 milliGRAM(s) Oral daily  buDESOnide   0.5 milliGRAM(s) Respule 0.5 milliGRAM(s) Inhalation two times a day  cefepime  Injectable. 1000 milliGRAM(s) IV Push daily  diltiazem    Tablet 30 milliGRAM(s) Oral every 6 hours  doxazosin 8 milliGRAM(s) Oral at bedtime  finasteride 5 milliGRAM(s) Oral daily  heparin  Injectable 5000 Unit(s) SubCutaneous every 8 hours  simvastatin 10 milliGRAM(s) Oral at bedtime  vancomycin    Solution 125 milliGRAM(s) Oral every 6 hours    MEDICATIONS  (PRN):  acetaminophen   Tablet .. 650 milliGRAM(s) Oral every 6 hours PRN Temp greater or equal to 38C (100.4F), Mild Pain (1 - 3)  ondansetron Injectable 4 milliGRAM(s) IV Push every 6 hours PRN Nausea        Allergies    Zosyn (Rash)    Intolerances        I&O's Summary        REVIEW OF SYSTEMS:    CONSTITUTIONAL:  As per HPI.  CONSTITUTIONAL: No weakness, fevers or chills  EYES/ENT: No visual changes;  No vertigo or throat pain   NECK: No pain or stiffness  CARDIOVASCULAR: No chest pain or palpitations  GASTROINTESTINAL: No abdominal or epigastric pain.   GENITOURINARY: No dysuria, frequency or hematuria  NEUROLOGICAL: No numbness or weakness  SKIN: No itching, burning, rashes, or lesions   All other review of systems is negative unless indicated above    Vital Signs Last 24 Hrs  T(C): --  T(F): --  HR: --  BP: --  BP(mean): --  RR: --  SpO2: --      PHYSICAL EXAM:    HEENT   no jvd  lungs clear   heart RR no rub  abd soft non tender  ext no edema      LABS:             10-07    144  |  112<H>  |  52<H>  ----------------------------<  131<H>  3.7   |  23  |  2.87<H>    Ca    8.5      07 Oct 2018 11:18  Phos  4.5     10-06  Mg     1.7     10-06    TPro  5.8<L>  /  Alb  2.4<L>  /  TBili  0.4  /  DBili  x   /  AST  24  /  ALT  31  /  AlkPhos  63  10-06                            9.9    4.93  )-----------( 142      ( 07 Oct 2018 11:18 )             32.4

## 2018-10-07 NOTE — PROGRESS NOTE ADULT - SUBJECTIVE AND OBJECTIVE BOX
Patient is a 83y old  Male who presents with a chief complaint of Fever and palpitations (07 Oct 2018 09:21)      HPI:  82 y/o M PMHx significant for CAD s/p stents, AFib, diastolic CHF, hx of upper GI bleeding, PVD, hypertension, hypertension, severe COPD (O2 dependent), SHAYY on bipap at night, ESRD on HD (MWF), recurrent c. diff who was BIBA from dialysis ([facility]) where he was found to have a post-dialysis fever associated w/ tachycardia. In triage => /min, Tmax 102.7'F. Labs => LA 1.2, BUN/Cr 27/1.59, Alb 2.7, Phos 4.2. UA (+), RVP (-). CT ABD/Pelvis => Trace pericolonic stranding at the level of proximal to mid descending colon. Clinical correlation is advised to assess for mild colitis. No significant wall thickening. Moderate fecal burden. No evidence of small bowel obstruction or extraluminal collection. Re-demonstrated incompletely characterize right hepatic lobe hypodensity during 3 x 1.6 cm for which nonemergent liver protocol MR is advised provided no contraindications. Diffuse bladder wall thickening with trace perivesicular stranding. Correlate with urinalysis to assess for cystitis in appropriate clinical setting. Consider nonemergent retrograde evaluation is concern for bladder malignancy. Uncomplicated cholelithiasis. In the ED the patient was given Yxxdfsbl4g IVPB x 1, Qdhymwtxkm9i IVPB x 1, NS 1.5L x 1. (06 Oct 2018 03:43)    pt comf and fatigued  neg cp  mild upper abd discomfort  and some lower abd burning prior to urination      PAST MEDICAL & SURGICAL HISTORY:  Above knee amputation of right lower extremity  Recurrent Clostridium difficile diarrhea  Diastolic CHF  Peripheral vascular disease  Afib  Anemia  CKD (chronic kidney disease)  COPD (chronic obstructive pulmonary disease)  SHAYY (obstructive sleep apnea)  Sepsis, due to unspecified organism: 2/2 poorly healing wounds b/l  Dyspepsia: On moderate exertion.  Sleep apnea, obstructive: Requires home 02 therapy, and treatment with BIPAP  Atelectasis  Pleural effusion, bilateral  Respiratory failure  Peripheral edema  CRI (chronic renal insufficiency)  Gout  Benign prostatic hypertrophy  Spinal stenosis  Hypercholesterolemia  GERD (gastroesophageal reflux disease)  CAD (coronary artery disease)  Hypertension  S/P angioplasty with stent  Cataract of left eye  Prostate: Surgery green light procedure.  S/P rotator cuff surgery: Right  S/P angioplasty      MEDICATIONS  (STANDING):  allopurinol 100 milliGRAM(s) Oral daily  aspirin  chewable 81 milliGRAM(s) Oral daily  buDESOnide   0.5 milliGRAM(s) Respule 0.5 milliGRAM(s) Inhalation two times a day  cefepime  Injectable. 1000 milliGRAM(s) IV Push daily  diltiazem    Tablet 30 milliGRAM(s) Oral every 6 hours  doxazosin 8 milliGRAM(s) Oral at bedtime  finasteride 5 milliGRAM(s) Oral daily  heparin  Injectable 5000 Unit(s) SubCutaneous every 8 hours  simvastatin 10 milliGRAM(s) Oral at bedtime  vancomycin    Solution 125 milliGRAM(s) Oral every 6 hours    MEDICATIONS  (PRN):  acetaminophen   Tablet .. 650 milliGRAM(s) Oral every 6 hours PRN Temp greater or equal to 38C (100.4F), Mild Pain (1 - 3)  ondansetron Injectable 4 milliGRAM(s) IV Push every 6 hours PRN Nausea      Allergies    Zosyn (Rash)    Intolerances        SOCIAL HISTORY:NC    FAMILY HISTORY:  Family history of colorectal cancer (Father)  Family history of diabetes mellitus (Mother, Sibling)  Family history of hypertension (Mother)      REVIEW OF SYSTEMS:    CONSTITUTIONAL: No weakness, fevers or chills  EYES/ENT: No visual changes;  No vertigo or throat pain   NECK: No pain or stiffness  RESPIRATORY: No cough, wheezing, hemoptysis; No shortness of breath  CARDIOVASCULAR: No chest pain or palpitations  GENITOURINARY: No dysuria, frequency or hematuria  NEUROLOGICAL: No numbness or weakness  SKIN: No itching, burning, rashes, or lesions   All other review of systems is negative unless indicated above.        PHYSICAL EXAM:    Constitutional: NAD, well-developed  HEENT: EOMI, throat clear  Neck: No LAD, supple  Respiratory: CTA and P  Cardiovascular: S1 and S2, RRR, no M  Gastrointestinal: BS+, soft, NT/ND, neg HSM,  Extremities: No peripheral edema, neg clubing, cyanosis, SP amp    Neurological: A/O x 3, no focal deficits  Psychiatric: Normal mood, normal affect  Skin: No rashes    LABS:  CBC Full  -  ( 07 Oct 2018 11:18 )  WBC Count : 4.93 K/uL  Hemoglobin : 9.9 g/dL  Hematocrit : 32.4 %  Platelet Count - Automated : 142 K/uL  Mean Cell Volume : 95.3 fl  Mean Cell Hemoglobin : 29.1 pg  Mean Cell Hemoglobin Concentration : 30.6 gm/dL  Auto Neutrophil # : x  Auto Lymphocyte # : x  Auto Monocyte # : x  Auto Eosinophil # : x  Auto Basophil # : x  Auto Neutrophil % : x  Auto Lymphocyte % : x  Auto Monocyte % : x  Auto Eosinophil % : x  Auto Basophil % : x    10-07    144  |  112<H>  |  52<H>  ----------------------------<  131<H>  3.7   |  23  |  2.87<H>    Ca    8.5      07 Oct 2018 11:18  Phos  4.5     10-06  Mg     1.7     10-06    TPro  5.8<L>  /  Alb  2.4<L>  /  TBili  0.4  /  DBili  x   /  AST  24  /  ALT  31  /  AlkPhos  63  10-06    PT/INR - ( 05 Oct 2018 20:40 )   PT: 12.3 sec;   INR: 1.14 ratio         PTT - ( 05 Oct 2018 20:40 )  PTT:59.5 sec        RADIOLOGY & ADDITIONAL STUDIES:

## 2018-10-07 NOTE — PROGRESS NOTE ADULT - SUBJECTIVE AND OBJECTIVE BOX
CC: Fever and palpitations (06 Oct 2018 03:43)    HPI:  82 y/o M PMHx significant for CAD s/p stents, AFib, diastolic CHF, hx of upper GI bleeding, PVD, hypertension, hypertension, severe COPD (O2 dependent), SHAYY on bipap at night, ESRD on HD (MWF), recurrent c. diff who was BIBA from dialysis ([facility]) where he was found to have a post-dialysis fever associated w/ tachycardia. In triage => /min, Tmax 102.7'F. Labs => LA 1.2, BUN/Cr 27/1.59, Alb 2.7, Phos 4.2. UA (+), RVP (-). CT ABD/Pelvis => Trace pericolonic stranding at the level of proximal to mid descending colon. Clinical correlation is advised to assess for mild colitis. No significant wall thickening. Moderate fecal burden. No evidence of small bowel obstruction or extraluminal collection. Re-demonstrated incompletely characterize right hepatic lobe hypodensity during 3 x 1.6 cm for which nonemergent liver protocol MR is advised provided no contraindications. Diffuse bladder wall thickening with trace perivesicular stranding. Correlate with urinalysis to assess for cystitis in appropriate clinical setting. Consider nonemergent retrograde evaluation is concern for bladder malignancy. Uncomplicated cholelithiasis. In the ED the patient was given [id number] IVPB x 1, Kmuvahnzkv0n IVPB x 1, NS 1.5L x 1. (06 Oct 2018 03:43)    INTERVAL HPI/ OVERNIGHT EVENTS: Chart reviewed, Pt was seen and examined,  confirms history above, states feels better since he was admitted, no further chills and fever, no abd pain, no dysuria, no diarrhea. Pt reports that follows up with wound care and Dr Clement for his chronic LLE ulcers.         REVIEW OF SYSTEMS:  All other review of systems is negative unless indicated above.      PHYSICAL EXAM:  General: Well developed; well nourished; in no acute distress  Eyes: PERRLA, EOMI; conjunctiva and sclera clear  Head: Normocephalic; atraumatic  ENMT: No nasal discharge; airway clear  Neck: Supple; non tender; no masses  Respiratory: Good air entry  No wheezes, rales or rhonchi  Cardiovascular: Regular rate and rhythm. S1 and S2 Normal; No murmurs  Gastrointestinal: Soft non-tender non-distended; Normal bowel sounds  Genitourinary: No costovertebral angle tenderness  Extremities: No edema, S/p R AKA  Vascular: Peripheral pulses diminished, LLE  Neurological: Alert and oriented x4  Skin: Warm and dry. No acute rash. Multiple LLE ulcers with polymem dressing, anterior ulcer with min exudate  Lymph Nodes: No acute cervical adenopathy  Musculoskeletal: Normal muscle tone, without deformities  Psychiatric: Cooperative and appropriate        LABS:                     10.4   4.83  )-----------( 143      ( 05 Oct 2018 20:40 )             33.1     05 Oct 2018 20:40    139    |  106    |  27     ----------------------------<  105    3.5     |  23     |  1.59     Ca    8.2        05 Oct 2018 20:40    TPro  6.5    /  Alb  2.7    /  TBili  0.3    /  DBili  x      /  AST  28     /  ALT  36     /  AlkPhos  74     05 Oct 2018 20:40    PT/INR - ( 05 Oct 2018 20:40 )   PT: 12.3 sec;   INR: 1.14 ratio         PTT - ( 05 Oct 2018 20:40 )  PTT:59.5 sec  CAPILLARY BLOOD GLUCOSE        LIVER FUNCTIONS - ( 05 Oct 2018 20:40 )  Alb: 2.7 g/dL / Pro: 6.5 gm/dL / ALK PHOS: 74 U/L / ALT: 36 U/L / AST: 28 U/L / GGT: x           Urinalysis Basic - ( 06 Oct 2018 01:57 )    Color: Yellow / Appearance: very cloudy / S.015 / pH: x  Gluc: x / Ketone: Trace  / Bili: Negative / Urobili: Negative mg/dL   Blood: x / Protein: 500 mg/dL / Nitrite: Negative   Leuk Esterase: Moderate / RBC: 0-2 /HPF / WBC TNTC /HPF   Sq Epi: x / Non Sq Epi: Negative / Bacteria: Few    Culture - Urine (18 @ 06:00)    Specimen Source: .Urine Clean Catch (Midstream)    Culture Results:   Culture grew 3 or more types of organisms which indicate  collection contamination; consider recollection only if clinically  indicated.      MEDICATIONS  (STANDING):  allopurinol 100 milliGRAM(s) Oral daily  aspirin  chewable 81 milliGRAM(s) Oral daily  buDESOnide   0.5 milliGRAM(s) Respule 0.5 milliGRAM(s) Inhalation two times a day  cefepime  Injectable. 1000 milliGRAM(s) IV Push daily  diltiazem    Tablet 30 milliGRAM(s) Oral every 6 hours  doxazosin 8 milliGRAM(s) Oral at bedtime  finasteride 5 milliGRAM(s) Oral daily  heparin  Injectable 5000 Unit(s) SubCutaneous every 8 hours  potassium chloride    Tablet ER 20 milliEquivalent(s) Oral once  simvastatin 10 milliGRAM(s) Oral at bedtime  vancomycin    Solution 125 milliGRAM(s) Oral every 6 hours    MEDICATIONS  (PRN):  acetaminophen   Tablet .. 650 milliGRAM(s) Oral every 6 hours PRN Temp greater or equal to 38C (100.4F), Mild Pain (1 - 3)  ondansetron Injectable 4 milliGRAM(s) IV Push every 6 hours PRN Nausea      RADIOLOGY & ADDITIONAL TESTS:    EXAM:  CT ABDOMEN AND PELVIS                        PROCEDURE DATE:  10/05/2018      FINDINGS:    Absence of intravenous contrast limits evaluation for focal lesions,   neoplasm, and vascular pathology.    Lower Thorax: No consolidation or effusion. Incompletely characterized   oblong right lower lobe opacity which nonemergent CT chest follow-up is   advised for better characterization.    Liver: Redemonstrated incompletely characterize right hepatic lobe   hypodensity during 3 x 1.6 cm on image 19 of series 2 for which   nonemergent liver protocol MR is advised provided no contraindications.  Biliary: No dilatation. Uncomplicated cholelithiasis.  Spleen: No suspicious lesions.      Pancreas: No inflammatory changes or ductal dilatation.      Adrenals: Normal.      Kidneys: No hydronephrosis. Stable scattered renal hypodense and   hyperdense lesions.  Vessels: Normal caliber. Atherosclerotic disease of the aorta and its   branches with associated diffuse dense vascular calcifications.   Infrarenal IVC filter noted. Vascular surgical clips are seen in the   right groin region. Right external iliac artery vascular stent identified.    GI tract: Gastric clips again noted at the fundus. Trace pericolonic   stranding at the level of proximal to mid descending colon. Clinical   correlation is advised to assess for mild colitis.No significant wall   thickening. Moderate fecal burden. No evidence of small bowel   obstruction. Normal-appearing appendix.    Peritoneum/retroperitoneum and mesentery: No free air. No organized fluid   collection. No adenopathy.        Pelvic organs/Bladder: Diffuse bladder wall thickening with trace   perivesicular stranding. Correlate with urinalysis to assess for cystitis   in appropriate clinical setting. Consider nonemergent retrograde   evaluation is concern for bladder malignancy. Heterogenous prostate   containing calcifications.    Abdominal wall: A small umbilical hernia containing portion of   obstructive bowelloop noted.  Bones and soft tissues: Multilevel degenerative changes of the spine   noted stable mild compression deformity of T12 vertebral body.   Degenerative changes of bilateral hip joints with nonspecific lucencies   in the right femoral head. Recommend clinical correlation to assess for   avascular necrosis.    IMPRESSION:    Trace pericolonic stranding at the level of proximal to mid descending   colon. Clinical correlation is advised to assess for mild colitis.No   significant wall thickening. Moderate fecal burden. No evidence of small   bowel obstruction or extraluminal collection.     Redemonstrated incompletely characterize right hepatic lobe hypodensity   during 3 x 1.6 cm for which nonemergent liver protocol MR is advised   provided no contraindications.    Diffuse bladder wall thickening with trace perivesicular stranding.   Correlate with urinalysis to assess for cystitis in appropriate clinical   setting. Consider nonemergent retrograde evaluation is concern for   bladder malignancy.    Uncomplicated cholelithiasis. CC: Fever and palpitations (06 Oct 2018 03:43)    HPI:  82 y/o M PMHx significant for CAD s/p stents, AFib, diastolic CHF, hx of upper GI bleeding, PVD, hypertension, hypertension, severe COPD (O2 dependent), SHAYY on bipap at night, ESRD on HD (MWF), recurrent c. diff who was BIBA from dialysis ([facility]) where he was found to have a post-dialysis fever associated w/ tachycardia. In triage => /min, Tmax 102.7'F. Labs => LA 1.2, BUN/Cr 27/1.59, Alb 2.7, Phos 4.2. UA (+), RVP (-). CT ABD/Pelvis => Trace pericolonic stranding at the level of proximal to mid descending colon. Clinical correlation is advised to assess for mild colitis. No significant wall thickening. Moderate fecal burden. No evidence of small bowel obstruction or extraluminal collection. Re-demonstrated incompletely characterize right hepatic lobe hypodensity during 3 x 1.6 cm for which nonemergent liver protocol MR is advised provided no contraindications. Diffuse bladder wall thickening with trace perivesicular stranding. Correlate with urinalysis to assess for cystitis in appropriate clinical setting. Consider nonemergent retrograde evaluation is concern for bladder malignancy. Uncomplicated cholelithiasis. In the ED the patient was given [id number] IVPB x 1, [id number] IVPB x 1, NS 1.5L x 1. (06 Oct 2018 03:43)    INTERVAL HPI/ OVERNIGHT EVENTS:  Pt was seen and examined,  reports feeling well no complains. No fevers, no CP or SOB       REVIEW OF SYSTEMS:  All other review of systems is negative unless indicated above.      PHYSICAL EXAM:  General: Well developed; well nourished; in no acute distress  Eyes: PERRLA, EOMI; conjunctiva and sclera clear  Head: Normocephalic; atraumatic  ENMT: No nasal discharge; airway clear  Neck: Supple; non tender; no masses  Respiratory: Good air entry  No wheezes, rales or rhonchi  Cardiovascular: Regular rate and rhythm. S1 and S2 Normal; No murmurs  Gastrointestinal: Soft non-tender non-distended; Normal bowel sounds  Genitourinary: No costovertebral angle tenderness  Extremities: No edema, S/p R AKA  Vascular: Peripheral pulses diminished, LLE  Neurological: Alert and oriented x4  Skin: Warm and dry. No acute rash.  LLE iwth dressing and in a boot   Lymph Nodes: No acute cervical adenopathy  Musculoskeletal: Normal muscle tone, without deformities  Psychiatric: Cooperative and appropriate        LABS:                        9.9    4.93  )-----------( 142      ( 07 Oct 2018 11:18 )             32.4     10-07    144  |  112<H>  |  52<H>  ----------------------------<  131<H>  3.7   |  23  |  2.87<H>    Ca    8.5      07 Oct 2018 11:18  Phos  4.5     10-06  Mg     1.7     10-06    TPro  5.8<L>  /  Alb  2.4<L>  /  TBili  0.4  /  DBili  x   /  AST  24  /  ALT  31  /  AlkPhos  63  10-06                       10.4   4.83  )-----------( 143      ( 05 Oct 2018 20:40 )             33.1     05 Oct 2018 20:40    139    |  106    |  27     ----------------------------<  105    3.5     |  23     |  1.59     Ca    8.2        05 Oct 2018 20:40    TPro  6.5    /  Alb  2.7    /  TBili  0.3    /  DBili  x      /  AST  28     /  ALT  36     /  AlkPhos  74     05 Oct 2018 20:40    PT/INR - ( 05 Oct 2018 20:40 )   PT: 12.3 sec;   INR: 1.14 ratio         PTT - ( 05 Oct 2018 20:40 )  PTT:59.5 sec  CAPILLARY BLOOD GLUCOSE        LIVER FUNCTIONS - ( 05 Oct 2018 20:40 )  Alb: 2.7 g/dL / Pro: 6.5 gm/dL / ALK PHOS: 74 U/L / ALT: 36 U/L / AST: 28 U/L / GGT: x           Urinalysis Basic - ( 06 Oct 2018 01:57 )    Color: Yellow / Appearance: very cloudy / S.015 / pH: x  Gluc: x / Ketone: Trace  / Bili: Negative / Urobili: Negative mg/dL   Blood: x / Protein: 500 mg/dL / Nitrite: Negative   Leuk Esterase: Moderate / RBC: 0-2 /HPF / WBC TNTC /HPF   Sq Epi: x / Non Sq Epi: Negative / Bacteria: Few    Culture - Urine (10.06.18 @ 01:57)    Specimen Source: .Urine None    Culture Results:   >100,000 CFU/ml Klebsiella pneumoniae        MEDICATIONS  (STANDING):  allopurinol 100 milliGRAM(s) Oral daily  aspirin  chewable 81 milliGRAM(s) Oral daily  buDESOnide   0.5 milliGRAM(s) Respule 0.5 milliGRAM(s) Inhalation two times a day  cefepime  Injectable. 1000 milliGRAM(s) IV Push daily  diltiazem    Tablet 30 milliGRAM(s) Oral every 6 hours  doxazosin 8 milliGRAM(s) Oral at bedtime  finasteride 5 milliGRAM(s) Oral daily  heparin  Injectable 5000 Unit(s) SubCutaneous every 8 hours  simvastatin 10 milliGRAM(s) Oral at bedtime  vancomycin    Solution 125 milliGRAM(s) Oral every 6 hours    MEDICATIONS  (PRN):  acetaminophen   Tablet .. 650 milliGRAM(s) Oral every 6 hours PRN Temp greater or equal to 38C (100.4F), Mild Pain (1 - 3)  ondansetron Injectable 4 milliGRAM(s) IV Push every 6 hours PRN Nausea        RADIOLOGY & ADDITIONAL TESTS:    EXAM:  CT ABDOMEN AND PELVIS                        PROCEDURE DATE:  10/05/2018      FINDINGS:    Absence of intravenous contrast limits evaluation for focal lesions,   neoplasm, and vascular pathology.    Lower Thorax: No consolidation or effusion. Incompletely characterized   oblong right lower lobe opacity which nonemergent CT chest follow-up is   advised for better characterization.    Liver: Redemonstrated incompletely characterize right hepatic lobe   hypodensity during 3 x 1.6 cm on image 19 of series 2 for which   nonemergent liver protocol MR is advised provided no contraindications.  Biliary: No dilatation. Uncomplicated cholelithiasis.  Spleen: No suspicious lesions.      Pancreas: No inflammatory changes or ductal dilatation.      Adrenals: Normal.      Kidneys: No hydronephrosis. Stable scattered renal hypodense and   hyperdense lesions.  Vessels: Normal caliber. Atherosclerotic disease of the aorta and its   branches with associated diffuse dense vascular calcifications.   Infrarenal IVC filter noted. Vascular surgical clips are seen in the   right groin region. Right external iliac artery vascular stent identified.    GI tract: Gastric clips again noted at the fundus. Trace pericolonic   stranding at the level of proximal to mid descending colon. Clinical   correlation is advised to assess for mild colitis.No significant wall   thickening. Moderate fecal burden. No evidence of small bowel   obstruction. Normal-appearing appendix.    Peritoneum/retroperitoneum and mesentery: No free air. No organized fluid   collection. No adenopathy.        Pelvic organs/Bladder: Diffuse bladder wall thickening with trace   perivesicular stranding. Correlate with urinalysis to assess for cystitis   in appropriate clinical setting. Consider nonemergent retrograde   evaluation is concern for bladder malignancy. Heterogenous prostate   containing calcifications.    Abdominal wall: A small umbilical hernia containing portion of   obstructive bowelloop noted.  Bones and soft tissues: Multilevel degenerative changes of the spine   noted stable mild compression deformity of T12 vertebral body.   Degenerative changes of bilateral hip joints with nonspecific lucencies   in the right femoral head. Recommend clinical correlation to assess for   avascular necrosis.    IMPRESSION:    Trace pericolonic stranding at the level of proximal to mid descending   colon. Clinical correlation is advised to assess for mild colitis.No   significant wall thickening. Moderate fecal burden. No evidence of small   bowel obstruction or extraluminal collection.     Redemonstrated incompletely characterize right hepatic lobe hypodensity   during 3 x 1.6 cm for which nonemergent liver protocol MR is advised   provided no contraindications.    Diffuse bladder wall thickening with trace perivesicular stranding.   Correlate with urinalysis to assess for cystitis in appropriate clinical   setting. Consider nonemergent retrograde evaluation is concern for   bladder malignancy.    Uncomplicated cholelithiasis.

## 2018-10-07 NOTE — PROGRESS NOTE ADULT - ASSESSMENT
82 y/o M PMHx significant for CAD s/p stents, AFib, diastolic CHF, hx of upper GI bleeding, PVD, hypertension, hypertension, severe COPD (O2 dependent), SHAYY on bipap at night, ESRD on HD (MWF), recurrent c. diff who was BIBA from dialysis ([facility]) where he was found to have a post-dialysis fever associated w/ tachycardia. In triage => /min, Tmax 102.7'F.   Seen in ER with daughter at bedside  chart reviewed case d/w HD nurses  possible UTI  blood cx done    a/p  ESRD on HD   dialysis via cvc  possible UTI  evaluate for bacteremia as well  duplex of UE for avf    10/7   febrile syndrome  cystitis  on Cefepime IV  Vanco po  HD MWF  vein mapping UE  Dr Clement evaluating for possible avf

## 2018-10-08 LAB
-  AMIKACIN: SIGNIFICANT CHANGE UP
-  AMOXICILLIN/CLAVULANIC ACID: SIGNIFICANT CHANGE UP
-  AMPICILLIN/SULBACTAM: SIGNIFICANT CHANGE UP
-  AMPICILLIN: SIGNIFICANT CHANGE UP
-  AZTREONAM: SIGNIFICANT CHANGE UP
-  CEFAZOLIN: SIGNIFICANT CHANGE UP
-  CEFEPIME: SIGNIFICANT CHANGE UP
-  CEFOXITIN: SIGNIFICANT CHANGE UP
-  CEFTRIAXONE: SIGNIFICANT CHANGE UP
-  CIPROFLOXACIN: SIGNIFICANT CHANGE UP
-  ERTAPENEM: SIGNIFICANT CHANGE UP
-  GENTAMICIN: SIGNIFICANT CHANGE UP
-  IMIPENEM: SIGNIFICANT CHANGE UP
-  LEVOFLOXACIN: SIGNIFICANT CHANGE UP
-  MEROPENEM: SIGNIFICANT CHANGE UP
-  NITROFURANTOIN: SIGNIFICANT CHANGE UP
-  PIPERACILLIN/TAZOBACTAM: SIGNIFICANT CHANGE UP
-  TIGECYCLINE: SIGNIFICANT CHANGE UP
-  TOBRAMYCIN: SIGNIFICANT CHANGE UP
-  TRIMETHOPRIM/SULFAMETHOXAZOLE: SIGNIFICANT CHANGE UP
ANION GAP SERPL CALC-SCNC: 10 MMOL/L — SIGNIFICANT CHANGE UP (ref 5–17)
BUN SERPL-MCNC: 58 MG/DL — HIGH (ref 7–23)
CALCIUM SERPL-MCNC: 8.5 MG/DL — SIGNIFICANT CHANGE UP (ref 8.5–10.1)
CHLORIDE SERPL-SCNC: 110 MMOL/L — HIGH (ref 96–108)
CO2 SERPL-SCNC: 23 MMOL/L — SIGNIFICANT CHANGE UP (ref 22–31)
CREAT SERPL-MCNC: 2.84 MG/DL — HIGH (ref 0.5–1.3)
CULTURE RESULTS: SIGNIFICANT CHANGE UP
GLUCOSE SERPL-MCNC: 127 MG/DL — HIGH (ref 70–99)
HCT VFR BLD CALC: 31.8 % — LOW (ref 39–50)
HGB BLD-MCNC: 9.7 G/DL — LOW (ref 13–17)
MCHC RBC-ENTMCNC: 29 PG — SIGNIFICANT CHANGE UP (ref 27–34)
MCHC RBC-ENTMCNC: 30.5 GM/DL — LOW (ref 32–36)
MCV RBC AUTO: 94.9 FL — SIGNIFICANT CHANGE UP (ref 80–100)
METHOD TYPE: SIGNIFICANT CHANGE UP
NRBC # BLD: 0 /100 WBCS — SIGNIFICANT CHANGE UP (ref 0–0)
ORGANISM # SPEC MICROSCOPIC CNT: SIGNIFICANT CHANGE UP
ORGANISM # SPEC MICROSCOPIC CNT: SIGNIFICANT CHANGE UP
PLATELET # BLD AUTO: 148 K/UL — LOW (ref 150–400)
POTASSIUM SERPL-MCNC: 3.9 MMOL/L — SIGNIFICANT CHANGE UP (ref 3.5–5.3)
POTASSIUM SERPL-SCNC: 3.9 MMOL/L — SIGNIFICANT CHANGE UP (ref 3.5–5.3)
RBC # BLD: 3.35 M/UL — LOW (ref 4.2–5.8)
RBC # FLD: 14.9 % — HIGH (ref 10.3–14.5)
SODIUM SERPL-SCNC: 143 MMOL/L — SIGNIFICANT CHANGE UP (ref 135–145)
SPECIMEN SOURCE: SIGNIFICANT CHANGE UP
WBC # BLD: 3.85 K/UL — SIGNIFICANT CHANGE UP (ref 3.8–10.5)
WBC # FLD AUTO: 3.85 K/UL — SIGNIFICANT CHANGE UP (ref 3.8–10.5)

## 2018-10-08 RX ORDER — MEROPENEM 1 G/30ML
500 INJECTION INTRAVENOUS EVERY 24 HOURS
Qty: 0 | Refills: 0 | Status: DISCONTINUED | OUTPATIENT
Start: 2018-10-08 | End: 2018-10-13

## 2018-10-08 RX ADMIN — SIMVASTATIN 10 MILLIGRAM(S): 20 TABLET, FILM COATED ORAL at 20:41

## 2018-10-08 RX ADMIN — Medication 125 MILLIGRAM(S): at 11:43

## 2018-10-08 RX ADMIN — Medication 0.5 MILLIGRAM(S): at 08:11

## 2018-10-08 RX ADMIN — FINASTERIDE 5 MILLIGRAM(S): 5 TABLET, FILM COATED ORAL at 11:43

## 2018-10-08 RX ADMIN — HEPARIN SODIUM 5000 UNIT(S): 5000 INJECTION INTRAVENOUS; SUBCUTANEOUS at 13:14

## 2018-10-08 RX ADMIN — Medication 0.5 MILLIGRAM(S): at 20:50

## 2018-10-08 RX ADMIN — HEPARIN SODIUM 5000 UNIT(S): 5000 INJECTION INTRAVENOUS; SUBCUTANEOUS at 05:15

## 2018-10-08 RX ADMIN — Medication 125 MILLIGRAM(S): at 05:15

## 2018-10-08 RX ADMIN — Medication 100 MILLIGRAM(S): at 11:43

## 2018-10-08 RX ADMIN — MEROPENEM 100 MILLIGRAM(S): 1 INJECTION INTRAVENOUS at 13:14

## 2018-10-08 RX ADMIN — Medication 125 MILLIGRAM(S): at 17:11

## 2018-10-08 RX ADMIN — Medication 125 MILLIGRAM(S): at 23:25

## 2018-10-08 RX ADMIN — Medication 8 MILLIGRAM(S): at 20:41

## 2018-10-08 RX ADMIN — HEPARIN SODIUM 5000 UNIT(S): 5000 INJECTION INTRAVENOUS; SUBCUTANEOUS at 20:40

## 2018-10-08 RX ADMIN — Medication 81 MILLIGRAM(S): at 11:43

## 2018-10-08 NOTE — PROGRESS NOTE ADULT - SUBJECTIVE AND OBJECTIVE BOX
NEPHROLOGY INTERVAL HPI/OVERNIGHT EVENTS:  10/8 SY  Feeling well.    Denies SOB.  Denies dysuria.  unclear if has increased urine output.    HPI:  84 y/o M PMHx significant for CAD s/p stents, AFib, diastolic CHF, hx of upper GI bleeding, PVD, hypertension, hypertension, severe COPD (O2 dependent), SHAYY on bipap at night, ESRD on HD (MWF), recurrent c. diff who was BIBA from dialysis ([facility]) where he was found to have a post-dialysis fever associated w/ tachycardia. In triage => /min, Tmax 102.7'F. Labs => LA 1.2, BUN/Cr 27/1.59, Alb 2.7, Phos 4.2. UA (+), RVP (-). CT ABD/Pelvis => Trace pericolonic stranding at the level of proximal to mid descending colon. Clinical correlation is advised to assess for mild colitis. No significant wall thickening. Moderate fecal burden. No evidence of small bowel obstruction or extraluminal collection. Re-demonstrated incompletely characterize right hepatic lobe hypodensity during 3 x 1.6 cm for which nonemergent liver protocol MR is advised provided no contraindications. Diffuse bladder wall thickening with trace perivesicular stranding. Correlate with urinalysis to assess for cystitis in appropriate clinical setting. Consider nonemergent retrograde evaluation is concern for bladder malignancy. Uncomplicated cholelithiasis. In the ED the patient was given Iwkusjei8o IVPB x 1, Pwtdvwygis1i IVPB x 1, NS 1.5L x 1.   Seen in ER with daughter at bedside  chart reviewed case d/w HD nurses  possible UTI  blood cx done    MEDICATIONS  (STANDING):  allopurinol 100 milliGRAM(s) Oral daily  aspirin  chewable 81 milliGRAM(s) Oral daily  buDESOnide   0.5 milliGRAM(s) Respule 0.5 milliGRAM(s) Inhalation two times a day  diltiazem    Tablet 30 milliGRAM(s) Oral every 6 hours  doxazosin 8 milliGRAM(s) Oral at bedtime  finasteride 5 milliGRAM(s) Oral daily  heparin  Injectable 5000 Unit(s) SubCutaneous every 8 hours  meropenem  IVPB 500 milliGRAM(s) IV Intermittent every 24 hours  simvastatin 10 milliGRAM(s) Oral at bedtime  vancomycin    Solution 125 milliGRAM(s) Oral every 6 hours    MEDICATIONS  (PRN):  acetaminophen   Tablet .. 650 milliGRAM(s) Oral every 6 hours PRN Temp greater or equal to 38C (100.4F), Mild Pain (1 - 3)  ondansetron Injectable 4 milliGRAM(s) IV Push every 6 hours PRN Nausea      PHYSICAL EXAM:  Alert and comfortable.  GENERAL: No distress  CHEST/LUNG: Clear to aus  HEART: S1S2 RRR  ABDOMEN: soft  EXTREMITIES: trace edema  SKIN:     LABS:                        9.7    3.85  )-----------( 148      ( 08 Oct 2018 11:25 )             31.8     10-08    143  |  110<H>  |  58<H>  ----------------------------<  127<H>  3.9   |  23  |  2.84<H>    Ca    8.5      08 Oct 2018 11:25                  RADIOLOGY & ADDITIONAL TESTS:

## 2018-10-08 NOTE — PROGRESS NOTE ADULT - ASSESSMENT
84 y/o M PMHx significant for CAD s/p stents, AFib, diastolic CHF, hx of upper GI bleeding, PVD, hypertension, hypertension, severe COPD (O2 dependent), SHAYY on bipap at night, ESRD on HD ([facility]), recurrent c. diff who was admitted for:     1) Fevers due to Klebsiella  UTI/ cystitis ESBL  - fevers resolved   - leukocytosis better   - CT reviewed   - No diarrhea, off isolation   - F/u BCX: NGTD   - UCX: + Klebsiella   - C/w Fshfpnnm1i ->meropenem as per ID   - On PO Vanco for Cdiff PPxs       2)CAD/AFib  -  rate control   - c/w  Diltiazem 30mg po q6h  - cont. ASA 81mg po daily    3)BPH  - c/w  Doxazosin 8mg po qHS  - c/w Dutasteride 0.5mg po qHS    4) COPD/SHAYY  - stable respiratory status   -CXR: neg   -cont. Budesonide nebs  -cont. supplemental O2 PRN   - BiPAP at night    5)ESRD  - C/w HD as per Renal     6) PAD, s/p R AKA, Chronic LLE ulcers   wound care eval  wound orders as per SX   Dr kailee toussaint - for AVF       6)Vte ppx

## 2018-10-08 NOTE — PROGRESS NOTE ADULT - SUBJECTIVE AND OBJECTIVE BOX
no significant changes     vein mapping ordered by Dr. Sanabria: pending    urine with Klebsiella > 100,000    blood cultures negative thus far    MEDICATIONS  (STANDING):  allopurinol 100 milliGRAM(s) Oral daily  aspirin  chewable 81 milliGRAM(s) Oral daily  buDESOnide   0.5 milliGRAM(s) Respule 0.5 milliGRAM(s) Inhalation two times a day  cefepime  Injectable. 1000 milliGRAM(s) IV Push daily  diltiazem    Tablet 30 milliGRAM(s) Oral every 6 hours  doxazosin 8 milliGRAM(s) Oral at bedtime  finasteride 5 milliGRAM(s) Oral daily  heparin  Injectable 5000 Unit(s) SubCutaneous every 8 hours  simvastatin 10 milliGRAM(s) Oral at bedtime  vancomycin    Solution 125 milliGRAM(s) Oral every 6 hours    MEDICATIONS  (PRN):  acetaminophen   Tablet .. 650 milliGRAM(s) Oral every 6 hours PRN Temp greater or equal to 38C (100.4F), Mild Pain (1 - 3)  ondansetron Injectable 4 milliGRAM(s) IV Push every 6 hours PRN Nausea      Allergies    Zosyn (Rash)    Intolerances        Flatus: [ ] YES [ ] NO             Bowel Movement: [ ] YES [ ] NO  Pain (0-10):            Pain Control Adequate: [ ] YES [ ] NO  Nausea: [ ] YES [ ] NO            Vomiting: [ ] YES [ ] NO  Diarrhea: [ ] YES [ ] NO         Constipation: [ ] YES [ ] NO     Chest Pain: [ ] YES [ ] NO    SOB:  [ ] YES [ ] NO    Vital Signs Last 24 Hrs  T(C): --  T(F): --  HR: --  BP: --  BP(mean): --  RR: --  SpO2: --    I&O's Summary      Physical Exam:  General: NAD, resting comfortably  Pulmonary: normal resp effort, CTA-B  Cardiovascular: NSR  Abdominal: soft, NT/ND  Extremities: WWP, normal strength  Neuro: A/O x 3, CNs II-XII grossly intact, normal motor/sensation, no focal deficits  Pulses:   Right:                                                                          Left:  FEM [ ]2+ [ ]1+ [ ]doppler                                             FEM [ ]2+ [ ]1+ [ ]doppler    POP [ ]2+ [ ]1+ [ ]doppler                                             POP [ ]2+ [ ]1+ [ ]doppler    DP [ ]2+ [ ]1+ [ ]doppler                                                DP [ ]2+ [ ]1+ [ ]doppler  PT[ ]2+ [ ]1+ [ ]doppler                                                  PT [ ]2+ [ ]1+ [ ]doppler    LABS:                        9.9    4.93  )-----------( 142      ( 07 Oct 2018 11:18 )             32.4     10-07    144  |  112<H>  |  52<H>  ----------------------------<  131<H>  3.7   |  23  |  2.87<H>    Ca    8.5      07 Oct 2018 11:18  Phos  4.5     10-06  Mg     1.7     10-06    TPro  5.8<L>  /  Alb  2.4<L>  /  TBili  0.4  /  DBili  x   /  AST  24  /  ALT  31  /  AlkPhos  63  10-06        LIVER FUNCTIONS - ( 06 Oct 2018 11:36 )  Alb: 2.4 g/dL / Pro: 5.8 gm/dL / ALK PHOS: 63 U/L / ALT: 31 U/L / AST: 24 U/L / GGT: x           CAPILLARY BLOOD GLUCOSE          RADIOLOGY & ADDITIONAL TESTS:

## 2018-10-08 NOTE — PROGRESS NOTE ADULT - ASSESSMENT
hypertension, hypertension, severe COPD (O2 dependent), SHAYY on bipap at night, ESRD on HD (MWF), recurrent c. diff who was BIBA from dialysis ([facility]) where he was found to have a post-dialysis fever associated w/ tachycardia. In triage => /min, Tmax 102.7'F. Labs => LA 1.2, BUN/Cr 27/1.59, Alb 2.7, Phos 4.2. UA (+), RVP (-). CT ABD/Pelvis => Trace pericolonic stranding at the level of proximal to mid descending colon. Clinical correlation is advised to assess for mild colitis. No significant wall thickening. Moderate fecal burden. No evidence of small bowel obstruction or extraluminal collection. Re-demonstrated incompletely characterize right hepatic lobe hypodensity during 3 x 1.6 cm for which nonemergent liver protocol MR is advised provided no contraindications. Diffuse bladder wall thickening with trace perivesicular stranding. Correlate with urinalysis to assess for cystitis in appropriate clinical setting. Consider nonemergent retrograde evaluation is concern for bladder malignancy. Uncomplicated cholelithiasis. In the ED the patient was given [id number] IVPB x 1, Sugmxwfcqr6d IVPB x 1, NS 1.5L x 1.    vascular note appreciated  Febrile syndrome with change in frequency of urination.    Patient with likely cystitis/urinary tract infection as cause of his symptoms.  Antibiotics    No evidence of active C. difficile at this time although patient is at high risk of recurrent C. difficile. Would empirically treat with vancomycin 4 times a day for the duration that he is on antibiotics and for 1-2 weeks afterwards.    Coronary artery disease, atrial fibrillation–rate control. Continue aspirin.  Anticoagulation was held in the past secondary to significant episodes of GI bleeding.  however, if needed can be restarted with inc risk bleed  last EKG not is A fib, but sinus tach.  However, if pt at ic risk embolic dx and AC needed would consider rerstarting and moitoring pt for anemia and evidence of bleeding    End-stage renal disease–continue hemodialysis

## 2018-10-08 NOTE — PROGRESS NOTE ADULT - ASSESSMENT
84 y/o M PMHx significant for CAD s/p stents, AFib, diastolic CHF, hx of upper GI bleeding, PVD, hypertension, hypertension, severe COPD (O2 dependent), SHAYY on bipap at night, ESRD on HD (MWF), recurrent c. diff who was BIBA from dialysis ([facility]) where he was found to have a post-dialysis fever associated w/ tachycardia. In triage => /min, Tmax 102.7'F.   Seen in ER with daughter at bedside  chart reviewed case d/w HD nurses  possible UTI  blood cx done    a/p  ESRD on HD   dialysis via cvc  possible UTI  evaluate for bacteremia as well  duplex of UE for avf    10/7   febrile syndrome  cystitis  on Cefepime IV  Vanco po  HD MWF  vein mapping UE  Dr Clement evaluating for possible avf    10/8 SY  --ARYA/CKD   For now HD dependent at [facility] tx schedule.  Will plan for HD in am.  Creat trending down.   Continue to monitor for renal recovery.  --UTI with resistant Klebsiella.  Started on Meropenem today.  --Plan for AVF once ID cleared and if pt does remain on hd this admission.

## 2018-10-08 NOTE — PROGRESS NOTE ADULT - SUBJECTIVE AND OBJECTIVE BOX
Patient is a 83y old  Male who presents with a chief complaint of Fever and palpitations (07 Oct 2018 14:30)      HPI:  84 y/o M PMHx significant for CAD s/p stents, AFib, diastolic CHF, hx of upper GI bleeding, PVD, hypertension, hypertension, severe COPD (O2 dependent), SHAYY on bipap at night, ESRD on HD (MWF), recurrent c. diff who was BIBA from dialysis ([facility]) where he was found to have a post-dialysis fever associated w/ tachycardia. In triage => /min, Tmax 102.7'F. Labs => LA 1.2, BUN/Cr 27/1.59, Alb 2.7, Phos 4.2. UA (+), RVP (-). CT ABD/Pelvis => Trace pericolonic stranding at the level of proximal to mid descending colon. Clinical correlation is advised to assess for mild colitis. No significant wall thickening. Moderate fecal burden. No evidence of small bowel obstruction or extraluminal collection. Re-demonstrated incompletely characterize right hepatic lobe hypodensity during 3 x 1.6 cm for which nonemergent liver protocol MR is advised provided no contraindications. Diffuse bladder wall thickening with trace perivesicular stranding. Correlate with urinalysis to assess for cystitis in appropriate clinical setting. Consider nonemergent retrograde evaluation is concern for bladder malignancy. Uncomplicated cholelithiasis. In the ED the patient was given Xtntyrjj3i IVPB x 1, Laxgrxfiqe2w IVPB x 1, NS 1.5L x 1. (06 Oct 2018 03:43)    pt comf with some mild lower abd cramping prior to urination  neg abd pain  neg CP or sob  pos fatigue      PAST MEDICAL & SURGICAL HISTORY:  Above knee amputation of right lower extremity  Recurrent Clostridium difficile diarrhea  Diastolic CHF  Peripheral vascular disease  Afib  Anemia  CKD (chronic kidney disease)  COPD (chronic obstructive pulmonary disease)  SHAYY (obstructive sleep apnea)  Sepsis, due to unspecified organism: 2/2 poorly healing wounds b/l  Dyspepsia: On moderate exertion.  Sleep apnea, obstructive: Requires home 02 therapy, and treatment with BIPAP  Atelectasis  Pleural effusion, bilateral  Respiratory failure  Peripheral edema  CRI (chronic renal insufficiency)  Gout  Benign prostatic hypertrophy  Spinal stenosis  Hypercholesterolemia  GERD (gastroesophageal reflux disease)  CAD (coronary artery disease)  Hypertension  S/P angioplasty with stent  Cataract of left eye  Prostate: Surgery green light procedure.  S/P rotator cuff surgery: Right  S/P angioplasty      MEDICATIONS  (STANDING):  allopurinol 100 milliGRAM(s) Oral daily  aspirin  chewable 81 milliGRAM(s) Oral daily  buDESOnide   0.5 milliGRAM(s) Respule 0.5 milliGRAM(s) Inhalation two times a day  cefepime  Injectable. 1000 milliGRAM(s) IV Push daily  diltiazem    Tablet 30 milliGRAM(s) Oral every 6 hours  doxazosin 8 milliGRAM(s) Oral at bedtime  finasteride 5 milliGRAM(s) Oral daily  heparin  Injectable 5000 Unit(s) SubCutaneous every 8 hours  simvastatin 10 milliGRAM(s) Oral at bedtime  vancomycin    Solution 125 milliGRAM(s) Oral every 6 hours    MEDICATIONS  (PRN):  acetaminophen   Tablet .. 650 milliGRAM(s) Oral every 6 hours PRN Temp greater or equal to 38C (100.4F), Mild Pain (1 - 3)  ondansetron Injectable 4 milliGRAM(s) IV Push every 6 hours PRN Nausea      Allergies    Zosyn (Rash)    Intolerances        SOCIAL HISTORY:NC    FAMILY HISTORY:  Family history of colorectal cancer (Father)  Family history of diabetes mellitus (Mother, Sibling)  Family history of hypertension (Mother)      REVIEW OF SYSTEMS:    CONSTITUTIONAL: No weakness, fevers or chills  EYES/ENT: No visual changes;  No vertigo or throat pain   NECK: No pain or stiffness  RESPIRATORY: No cough, wheezing, hemoptysis; No shortness of breath  CARDIOVASCULAR: No chest pain or palpitations  GENITOURINARY: No dysuria, frequency or hematuria  NEUROLOGICAL: No numbness or weakness  SKIN: No itching, burning, rashes, or lesions   All other review of systems is negative unless indicated above.    Vital Signs Last 24 Hrs  stable and reviewed    PHYSICAL EXAM:    Constitutional: NAD, well-developed  HEENT: EOMI, throat clear  Neck: No LAD, supple  Respiratory: CTA and P  Cardiovascular: S1 and S2, RRR, no M  Gastrointestinal: BS+, soft, NT/ND, neg HSM,  Extremities: No peripheral edema, neg clubing, cyanosis  Vascular: 2+ peripheral pulses  Neurological: A/O x 3, no focal deficits  Psychiatric: Normal mood, normal affect  Skin: No rashes    LABS:  CBC Full  -  ( 07 Oct 2018 11:18 )  WBC Count : 4.93 K/uL  Hemoglobin : 9.9 g/dL  Hematocrit : 32.4 %  Platelet Count - Automated : 142 K/uL  Mean Cell Volume : 95.3 fl  Mean Cell Hemoglobin : 29.1 pg  Mean Cell Hemoglobin Concentration : 30.6 gm/dL  Auto Neutrophil # : x  Auto Lymphocyte # : x  Auto Monocyte # : x  Auto Eosinophil # : x  Auto Basophil # : x  Auto Neutrophil % : x  Auto Lymphocyte % : x  Auto Monocyte % : x  Auto Eosinophil % : x  Auto Basophil % : x    10-07    144  |  112<H>  |  52<H>  ----------------------------<  131<H>  3.7   |  23  |  2.87<H>    Ca    8.5      07 Oct 2018 11:18  Phos  4.5     10-06  Mg     1.7     10-06    TPro  5.8<L>  /  Alb  2.4<L>  /  TBili  0.4  /  DBili  x   /  AST  24  /  ALT  31  /  AlkPhos  63  10-06        10-07 @ 11:19  Hep A Igm Nonreact  Hep A total ab, IgA and M --  Hep B core Ab total --  Hep B core IgM Nonreact  Hep B DNA PCR --  Hep BSAg --  Hep BSAb --  Hep BSAb --  HCV Ab --  HCV RNA Log --  HCV RNA interp --                RADIOLOGY & ADDITIONAL STUDIES:

## 2018-10-08 NOTE — PROGRESS NOTE ADULT - SUBJECTIVE AND OBJECTIVE BOX
CC: Fever and palpitations     HPI:     84 y/o M PMHx significant for CAD s/p stents, AFib, diastolic CHF, hx of upper GI bleeding, PVD, hypertension, hypertension, severe COPD (O2 dependent), SHAYY on bipap at night, ESRD on HD (MWF), recurrent c. diff who was BIBA on 10/5/18  from dialysis ([facility]) where he was found to have a post-dialysis fever associated w/ tachycardia. In triage => /min, Tmax 102.7'F. Labs => LA 1.2, BUN/Cr 27/1.59, Alb 2.7, Phos 4.2. UA (+), RVP (-). CT ABD/Pelvis => Trace pericolonic stranding at the level of proximal to mid descending colon. Clinical correlation is advised to assess for mild colitis. No significant wall thickening. Moderate fecal burden. No evidence of small bowel obstruction or extraluminal collection. Re-demonstrated incompletely characterize right hepatic lobe hypodensity during 3 x 1.6 cm for which nonemergent liver protocol MR is advised provided no contraindications. Diffuse bladder wall thickening with trace perivesicular stranding. Correlate with urinalysis to assess for cystitis in appropriate clinical setting. Consider nonemergent retrograde evaluation is concern for bladder malignancy. Uncomplicated cholelithiasis. In the ED the patient was given Iqwdxygz3k IVPB x 1, Trfccpshgl3r IVPB x 1, NS 1.5L x 1.      INTERVAL HPI/ OVERNIGHT EVENTS:    10/7/18 Pt was seen and examined,  reports feeling well no complains. No fevers, no CP or SOB   10/8 /18 pt seen and examined, denies fever, chills, abdominal pain, tolerating PO diet, denies new sx  REVIEW OF SYSTEMS:  All other review of systems is negative unless indicated above.  T(C): 36.3 (10-08-18 @ 17:26), Max: 36.7 (10-08-18 @ 13:15)  T(F): 97.3 (10-08-18 @ 17:26), Max: 98 (10-08-18 @ 13:15)  HR: 69 (10-08-18 @ 17:26) (69 - 82)  BP: 138/42 (10-08-18 @ 17:26) (121/43 - 138/42)  RR: 18 (10-08-18 @ 17:26) (17 - 18)  SpO2: 100% (10-08-18 @ 17:26) (96% - 100%)  Wt(kg): --    PHYSICAL EXAM:  General: Well developed; well nourished; in no acute distress  Eyes: PERRLA, EOMI; conjunctiva and sclera clear  Head: Normocephalic; atraumatic  ENMT: No nasal discharge; airway clear  Neck: Supple; non tender; no masses  Respiratory: Good air entry  No wheezes, rales or rhonchi  Cardiovascular: Regular rate and rhythm. S1 and S2 Normal; No murmurs  Gastrointestinal: Soft non-tender non-distended; Normal bowel sounds  Genitourinary: No costovertebral angle tenderness  Extremities: No edema, S/p R AKA  Vascular: Peripheral pulses diminished, LLE  Neurological: Alert and oriented x4  Skin: Warm and dry. No acute rash.  LLE with dressing and in a boot   Lymph Nodes: No acute cervical adenopathy  Musculoskeletal: Normal muscle tone, without deformities  Psychiatric: Cooperative and appropriate        LABS:  10-08    143  |  110<H>  |  58<H>  ----------------------------<  127<H>  3.9   |  23  |  2.84<H>    Ca    8.5      08 Oct 2018 11:25                          9.7    3.85  )-----------( 148      ( 08 Oct 2018 11:25 )             31.8                         9.9    4.93  )-----------( 142      ( 07 Oct 2018 11:18 )             32.4     10-07    144  |  112<H>  |  52<H>  ----------------------------<  131<H>  3.7   |  23  |  2.87<H>    Ca    8.5      07 Oct 2018 11:18  Phos  4.5     10-06  Mg     1.7     10-06    TPro  5.8<L>  /  Alb  2.4<L>  /  TBili  0.4  /  DBili  x   /  AST  24  /  ALT  31  /  AlkPhos  63  10-06                       10.4   4.83  )-----------( 143      ( 05 Oct 2018 20:40 )             33.1     05 Oct 2018 20:40    139    |  106    |  27     ----------------------------<  105    3.5     |  23     |  1.59     Ca    8.2        05 Oct 2018 20:40    TPro  6.5    /  Alb  2.7    /  TBili  0.3    /  DBili  x      /  AST  28     /  ALT  36     /  AlkPhos  74     05 Oct 2018 20:40    PT/INR - ( 05 Oct 2018 20:40 )   PT: 12.3 sec;   INR: 1.14 ratio         PTT - ( 05 Oct 2018 20:40 )  PTT:59.5 sec  CAPILLARY BLOOD GLUCOSE        LIVER FUNCTIONS - ( 05 Oct 2018 20:40 )  Alb: 2.7 g/dL / Pro: 6.5 gm/dL / ALK PHOS: 74 U/L / ALT: 36 U/L / AST: 28 U/L / GGT: x           Urinalysis Basic - ( 06 Oct 2018 01:57 )    Color: Yellow / Appearance: very cloudy / S.015 / pH: x  Gluc: x / Ketone: Trace  / Bili: Negative / Urobili: Negative mg/dL   Blood: x / Protein: 500 mg/dL / Nitrite: Negative   Leuk Esterase: Moderate / RBC: 0-2 /HPF / WBC TNTC /HPF   Sq Epi: x / Non Sq Epi: Negative / Bacteria: Few    Culture - Urine (10.06.18 @ 01:57)    Specimen Source: .Urine None    Culture Results:   >100,000 CFU/ml Klebsiella pneumoniae      Culture - Urine (10.06.18 @ 01:57)    -  Amikacin: S <=8    -  Amoxicillin/Clavulanic Acid: S <=8/4    -  Ampicillin: R >16 These ampicillin results predict results for amoxicillin    -  Ampicillin/Sulbactam: R >16/8    -  Aztreonam: R >16    -  Cefazolin: R >16 For uncomplicated UTI with K. pneumoniae, E. coli, or P. mirablis: DAVIDSON <=16 is sensitive and DAVIDSON >=32 is resistant. This also predicts results for oral agents cefaclor, cefdinir, cefpodoxime, cefprozil, cefuroxime axetil, cephalexin and locarbef for uncomplicated UTI. Note that some isolates may be susceptible to these agents while testing resistant to cefazolin.    -  Cefepime: R >16    -  Cefoxitin: S <=4    -  Ceftriaxone: R >32 Enterobacter, Citrobacter, and Serratia may develop resistance during prolonged therapy    -  Ciprofloxacin: S 1    -  Ertapenem: S <=0.5    -  Gentamicin: R >8    -  Imipenem: S <=1    -  Levofloxacin: S <=1    -  Meropenem: S <=1    -  Nitrofurantoin: R >64 Should not be used to treat pyelonephritis    -  Piperacillin/Tazobactam: R <=8    -  Tigecycline: S <=1    -  Tobramycin: I 8    -  Trimethoprim/Sulfamethoxazole: R >    Specimen Source: .Urine None    Culture Results:   >100,000 CFU/ml Klebsiella pneumoniae ESBL  Multiple Morphological Strains    Organism Identification: Klebsiella pneumoniae ESBL    Organism: Klebsiella pneumoniae ESBL    Method Type: DAVIDSON        RADIOLOGY & ADDITIONAL TESTS:    EXAM:  CT ABDOMEN AND PELVIS                        PROCEDURE DATE:  10/05/2018      FINDINGS:    Absence of intravenous contrast limits evaluation for focal lesions,   neoplasm, and vascular pathology.    Lower Thorax: No consolidation or effusion. Incompletely characterized   oblong right lower lobe opacity which nonemergent CT chest follow-up is   advised for better characterization.    Liver: Redemonstrated incompletely characterize right hepatic lobe   hypodensity during 3 x 1.6 cm on image 19 of series 2 for which   nonemergent liver protocol MR is advised provided no contraindications.  Biliary: No dilatation. Uncomplicated cholelithiasis.  Spleen: No suspicious lesions.      Pancreas: No inflammatory changes or ductal dilatation.      Adrenals: Normal.      Kidneys: No hydronephrosis. Stable scattered renal hypodense and   hyperdense lesions.  Vessels: Normal caliber. Atherosclerotic disease of the aorta and its   branches with associated diffuse dense vascular calcifications.   Infrarenal IVC filter noted. Vascular surgical clips are seen in the   right groin region. Right external iliac artery vascular stent identified.    GI tract: Gastric clips again noted at the fundus. Trace pericolonic   stranding at the level of proximal to mid descending colon. Clinical   correlation is advised to assess for mild colitis.No significant wall   thickening. Moderate fecal burden. No evidence of small bowel   obstruction. Normal-appearing appendix.    Peritoneum/retroperitoneum and mesentery: No free air. No organized fluid   collection. No adenopathy.        Pelvic organs/Bladder: Diffuse bladder wall thickening with trace   perivesicular stranding. Correlate with urinalysis to assess for cystitis   in appropriate clinical setting. Consider nonemergent retrograde   evaluation is concern for bladder malignancy. Heterogenous prostate   containing calcifications.    Abdominal wall: A small umbilical hernia containing portion of   obstructive bowelloop noted.  Bones and soft tissues: Multilevel degenerative changes of the spine   noted stable mild compression deformity of T12 vertebral body.   Degenerative changes of bilateral hip joints with nonspecific lucencies   in the right femoral head. Recommend clinical correlation to assess for   avascular necrosis.    IMPRESSION:    Trace pericolonic stranding at the level of proximal to mid descending   colon. Clinical correlation is advised to assess for mild colitis.No   significant wall thickening. Moderate fecal burden. No evidence of small   bowel obstruction or extraluminal collection.     Redemonstrated incompletely characterize right hepatic lobe hypodensity   during 3 x 1.6 cm for which nonemergent liver protocol MR is advised   provided no contraindications.    Diffuse bladder wall thickening with trace perivesicular stranding.   Correlate with urinalysis to assess for cystitis in appropriate clinical   setting. Consider nonemergent retrograde evaluation is concern for   bladder malignancy.    Uncomplicated cholelithiasis.

## 2018-10-08 NOTE — PROGRESS NOTE ADULT - SUBJECTIVE AND OBJECTIVE BOX
Date of service: 10-08-18 @ 14:15    pt seen and examined  feeling better  no fevers    ROS: no fever or chills; denies dizziness, no HA, no SOB or cough, no abdominal pain, no diarrhea or constipation;  no legs pain, no rashes    MEDICATIONS  (STANDING):  allopurinol 100 milliGRAM(s) Oral daily  aspirin  chewable 81 milliGRAM(s) Oral daily  buDESOnide   0.5 milliGRAM(s) Respule 0.5 milliGRAM(s) Inhalation two times a day  diltiazem    Tablet 30 milliGRAM(s) Oral every 6 hours  doxazosin 8 milliGRAM(s) Oral at bedtime  finasteride 5 milliGRAM(s) Oral daily  heparin  Injectable 5000 Unit(s) SubCutaneous every 8 hours  meropenem  IVPB 500 milliGRAM(s) IV Intermittent every 24 hours  simvastatin 10 milliGRAM(s) Oral at bedtime  vancomycin    Solution 125 milliGRAM(s) Oral every 6 hours    Vital Signs Last 24 Hrs  T(C): 36.7 (08 Oct 2018 13:15), Max: 36.7 (08 Oct 2018 13:15)  T(F): 98 (08 Oct 2018 13:15), Max: 98 (08 Oct 2018 13:15)  HR: 74 (08 Oct 2018 13:15) (74 - 82)  BP: 121/43 (08 Oct 2018 13:15) (121/43 - 121/43)  BP(mean): --  RR: 17 (08 Oct 2018 13:15) (17 - 17)  SpO2: 96% (08 Oct 2018 13:15) (96% - 96%)      PE:  Constitutional: frail looking  HEENT: NC/AT, EOMI, PERRLA, conjunctivae clear; ears and nose atraumatic; pharynx benign  Neck: supple; thyroid not palpable  Back: no tenderness  Respiratory: respiratory effort normal; clear to auscultation  Cardiovascular: S1S2 regular, no murmurs  Abdomen: soft,  tender on deep palpation, not distended, positive BS; liver and spleen WNL  Genitourinary: no suprapubic tenderness  Lymphatic: no LN palpable  Musculoskeletal: no muscle tenderness, no joint swelling or tenderness  Extremities: R ext s/p BKA, L foot in brace  Neurological/ Psychiatric:   moving all extremities  Skin: no rashes; no palpable lesions    Labs: all available labs reviewed                                   9.7    3.85  )-----------( 148      ( 08 Oct 2018 11:25 )             31.8     10-08    143  |  110<H>  |  58<H>  ----------------------------<  127<H>  3.9   |  23  |  2.84<H>    Ca    8.5      08 Oct 2018 11:25        Urinalysis Basic - ( 06 Oct 2018 01:57 )    Color: Yellow / Appearance: very cloudy / S.015 / pH: x  Gluc: x / Ketone: Trace  / Bili: Negative / Urobili: Negative mg/dL   Blood: x / Protein: 500 mg/dL / Nitrite: Negative   Leuk Esterase: Moderate / RBC: 0-2 /HPF / WBC TNTC /HPF   Sq Epi: x / Non Sq Epi: Negative / Bacteria: Few    Culture - Urine (10.06.18 @ 01:57)    -  Amikacin: S <=8    -  Amoxicillin/Clavulanic Acid: S <=8/4    -  Ampicillin: R >16 These ampicillin results predict results for amoxicillin    -  Ampicillin/Sulbactam: R >16/8    -  Aztreonam: R >16    -  Cefazolin: R >16 For uncomplicated UTI with K. pneumoniae, E. coli, or P. mirablis: DAVIDSON <=16 is sensitive and DAVIDSON >=32 is resistant. This also predicts results for oral agents cefaclor, cefdinir, cefpodoxime, cefprozil, cefuroxime axetil, cephalexin and locarbef for uncomplicated UTI. Note that some isolates may be susceptible to these agents while testing resistant to cefazolin.    -  Cefepime: R >16    -  Cefoxitin: S <=4    -  Ceftriaxone: R >32 Enterobacter, Citrobacter, and Serratia may develop resistance during prolonged therapy    -  Ciprofloxacin: S 1    -  Ertapenem: S <=0.5    -  Gentamicin: R >8    -  Imipenem: S <=1    -  Levofloxacin: S <=1    -  Meropenem: S <=1    -  Nitrofurantoin: R >64 Should not be used to treat pyelonephritis    -  Piperacillin/Tazobactam: R <=8    -  Tigecycline: S <=1    -  Tobramycin: I 8    -  Trimethoprim/Sulfamethoxazole: R >    Specimen Source: .Urine None    Culture Results:   >100,000 CFU/ml Klebsiella pneumoniae ESBL  Multiple Morphological Strains    Organism Identification: Klebsiella pneumoniae ESBL    Organism: Klebsiella pneumoniae ESBL    Method Type: DAVIDSON          Radiology: all available radiological tests reviewed  < from: Xray Chest 1 View-PORTABLE IMMEDIATE (10.05.18 @ 21:16) >  EXAM:  XR CHEST PORTABLE IMMED 1V                            PROCEDURE DATE:  10/05/2018          INTERPRETATION:  History: Sepsis dyspnea    Chest:  one view.      Comparison: 2018    AP radiograph of the chest demonstrates no evidence of infiltrate,   pleural effusion or vascular congestion. No atelectasis is seen. RIGHT   central venous catheter tip in SVC. The cardiac silhouette is normal in   size. Vascular calcification involves the aorta. Osseous structures are   intact.    Impression: No active pulmonary disease.        < end of copied text >    < from: CT Abdomen and Pelvis No Cont (10.05.18 @ 21:36) >    EXAM:  CT ABDOMEN AND PELVIS                            PROCEDURE DATE:  10/05/2018          INTERPRETATION:  CT ABDOMEN AND PELVIS WITHOUT CONTRAST    INDICATION: Sepsis and abdominal distention.    TECHNIQUE: Abdominopelvic CT without intravenouscontrast.Images are   reformatted in the sagittal and coronal planes.    COMPARISON: CT abdomen pelvis 2018.    FINDINGS:    Absence of intravenous contrast limits evaluation for focal lesions,   neoplasm, and vascular pathology.    Lower Thorax: No consolidation or effusion. Incompletely characterized   oblong right lower lobe opacity which nonemergent CT chest follow-up is   advised for better characterization.    Liver: Redemonstrated incompletely characterize right hepatic lobe   hypodensity during 3 x 1.6 cm on image 19 of series 2 for which   nonemergent liver protocol MR is advised provided no contraindications.  Biliary: No dilatation. Uncomplicated cholelithiasis.  Spleen: No suspicious lesions.      Pancreas: No inflammatory changes or ductal dilatation.      Adrenals: Normal.      Kidneys: No hydronephrosis. Stable scattered renal hypodense and   hyperdense lesions.  Vessels: Normal caliber. Atherosclerotic disease of the aorta and its   branches with associated diffuse dense vascular calcifications.   Infrarenal IVC filter noted. Vascular surgical clips are seen in the   right groin region. Right external iliac artery vascular stent identified.    GI tract: Gastric clips again noted at the fundus. Trace pericolonic   stranding at the level of proximal to mid descending colon. Clinical   correlation is advised to assess for mild colitis.No significant wall   thickening. Moderate fecal burden. No evidence of small bowel   obstruction. Normal-appearing appendix.    Peritoneum/retroperitoneum and mesentery: No free air. No organized fluid   collection. No adenopathy.        Pelvic organs/Bladder: Diffuse bladder wall thickening with trace   perivesicular stranding. Correlate with urinalysis to assess for cystitis   in appropriate clinical setting. Consider nonemergent retrograde   evaluation is concern for bladder malignancy. Heterogenous prostate   containing calcifications.    Abdominal wall: A small umbilical hernia containing portion of   obstructive bowelloop noted.  Bones and soft tissues: Multilevel degenerative changes of the spine   noted stable mild compression deformity of T12 vertebral body.   Degenerative changes of bilateral hip joints with nonspecific lucencies   in the right femoral head. Recommend clinical correlation to assess for   avascular necrosis.    IMPRESSION:    Trace pericolonic stranding at the level of proximal to mid descending   colon. Clinical correlation is advised to assess for mild colitis.No   significant wall thickening. Moderate fecal burden. No evidence of small   bowel obstruction or extraluminal collection.     Redemonstrated incompletely characterize right hepatic lobe hypodensity   during 3 x 1.6 cm for which nonemergent liver protocol MR is advised   provided no contraindications.    Diffuse bladder wall thickening with trace perivesicular stranding.   Correlate with urinalysis to assess for cystitis in appropriate clinical   setting. Consider nonemergent retrograde evaluation is concern for   bladder malignancy.    Uncomplicated cholelithiasis.    Additional findings as detailed above.      Advanced directives addressed: full resuscitation

## 2018-10-08 NOTE — PROGRESS NOTE ADULT - ASSESSMENT
82 y/o M PMHx significant for CAD s/p stents, AFib, diastolic CHF, hx of upper GI bleeding, PVD, hypertension, hypertension, severe COPD (O2 dependent), SHAYY on bipap at night, ESRD on HD (MWF), recurrent c. diff who was BIBA from dialysis ([facility]) where he was found to have a post-dialysis fever associated w/ tachycardia. In triage => /min, Tmax 102.7'F. Labs => LA 1.2, BUN/Cr 27/1.59, Alb 2.7, Phos 4.2. UA (+), RVP (-). CT ABD/Pelvis => Trace pericolonic stranding at the level of proximal to mid descending colon. Clinical correlation is advised to assess for mild colitis.  Diffuse bladder wall thickening with trace perivesicular stranding. in the ED the patient was given [id number] IVPB x 1, Jyvxmycwpt0k IVPB x 1, NS 1.5L x 1.     1. febrile syndrome/cystitis/hx of CDAD/ESRD  - improving   - s/p cefepime #2  - urine cx growing KLPN ESBL >100,000  - switch to meropenem 500mg daily   - plan for 3 days  - on oral vancomycin [id number] for c diff prophylaxis while remains on abx #3  - monitor temps  - tolerating abx well so far; no side effects noted  - reason for abx use and side effects reviewed with patient  - supportive care  - f/u cbc

## 2018-10-09 LAB
ALBUMIN SERPL ELPH-MCNC: 1.6 G/DL — LOW (ref 3.3–5)
ANION GAP SERPL CALC-SCNC: 14 MMOL/L — SIGNIFICANT CHANGE UP (ref 5–17)
BUN SERPL-MCNC: 56 MG/DL — HIGH (ref 7–23)
CALCIUM SERPL-MCNC: 7.7 MG/DL — LOW (ref 8.5–10.1)
CHLORIDE SERPL-SCNC: 114 MMOL/L — HIGH (ref 96–108)
CO2 SERPL-SCNC: 21 MMOL/L — LOW (ref 22–31)
CREAT SERPL-MCNC: 2.59 MG/DL — HIGH (ref 0.5–1.3)
GLUCOSE SERPL-MCNC: 110 MG/DL — HIGH (ref 70–99)
HCT VFR BLD CALC: 28.8 % — LOW (ref 39–50)
HGB BLD-MCNC: 8.9 G/DL — LOW (ref 13–17)
INR BLD: 1.03 RATIO — SIGNIFICANT CHANGE UP (ref 0.88–1.16)
MCHC RBC-ENTMCNC: 29.7 PG — SIGNIFICANT CHANGE UP (ref 27–34)
MCHC RBC-ENTMCNC: 30.9 GM/DL — LOW (ref 32–36)
MCV RBC AUTO: 96 FL — SIGNIFICANT CHANGE UP (ref 80–100)
NRBC # BLD: 0 /100 WBCS — SIGNIFICANT CHANGE UP (ref 0–0)
PHOSPHATE SERPL-MCNC: 4.6 MG/DL — HIGH (ref 2.5–4.5)
PLATELET # BLD AUTO: 141 K/UL — LOW (ref 150–400)
POTASSIUM SERPL-MCNC: 3.5 MMOL/L — SIGNIFICANT CHANGE UP (ref 3.5–5.3)
POTASSIUM SERPL-SCNC: 3.5 MMOL/L — SIGNIFICANT CHANGE UP (ref 3.5–5.3)
PROTHROM AB SERPL-ACNC: 11.1 SEC — SIGNIFICANT CHANGE UP (ref 9.8–12.7)
RBC # BLD: 3 M/UL — LOW (ref 4.2–5.8)
RBC # FLD: 14.9 % — HIGH (ref 10.3–14.5)
SODIUM SERPL-SCNC: 149 MMOL/L — HIGH (ref 135–145)
WBC # BLD: 3.58 K/UL — LOW (ref 3.8–10.5)
WBC # FLD AUTO: 3.58 K/UL — LOW (ref 3.8–10.5)

## 2018-10-09 RX ORDER — WARFARIN SODIUM 2.5 MG/1
5 TABLET ORAL ONCE
Qty: 0 | Refills: 0 | Status: COMPLETED | OUTPATIENT
Start: 2018-10-09 | End: 2018-10-09

## 2018-10-09 RX ORDER — ERYTHROPOIETIN 10000 [IU]/ML
6000 INJECTION, SOLUTION INTRAVENOUS; SUBCUTANEOUS ONCE
Qty: 0 | Refills: 0 | Status: COMPLETED | OUTPATIENT
Start: 2018-10-09 | End: 2018-10-09

## 2018-10-09 RX ADMIN — Medication 81 MILLIGRAM(S): at 13:16

## 2018-10-09 RX ADMIN — HEPARIN SODIUM 5000 UNIT(S): 5000 INJECTION INTRAVENOUS; SUBCUTANEOUS at 05:02

## 2018-10-09 RX ADMIN — HEPARIN SODIUM 5000 UNIT(S): 5000 INJECTION INTRAVENOUS; SUBCUTANEOUS at 21:35

## 2018-10-09 RX ADMIN — FINASTERIDE 5 MILLIGRAM(S): 5 TABLET, FILM COATED ORAL at 13:16

## 2018-10-09 RX ADMIN — ERYTHROPOIETIN 6000 UNIT(S): 10000 INJECTION, SOLUTION INTRAVENOUS; SUBCUTANEOUS at 11:20

## 2018-10-09 RX ADMIN — WARFARIN SODIUM 5 MILLIGRAM(S): 2.5 TABLET ORAL at 21:35

## 2018-10-09 RX ADMIN — Medication 125 MILLIGRAM(S): at 05:03

## 2018-10-09 RX ADMIN — Medication 8 MILLIGRAM(S): at 21:35

## 2018-10-09 RX ADMIN — SIMVASTATIN 10 MILLIGRAM(S): 20 TABLET, FILM COATED ORAL at 21:35

## 2018-10-09 RX ADMIN — Medication 100 MILLIGRAM(S): at 13:22

## 2018-10-09 RX ADMIN — Medication 125 MILLIGRAM(S): at 13:22

## 2018-10-09 RX ADMIN — Medication 125 MILLIGRAM(S): at 17:25

## 2018-10-09 RX ADMIN — Medication 0.5 MILLIGRAM(S): at 20:54

## 2018-10-09 RX ADMIN — Medication 125 MILLIGRAM(S): at 23:01

## 2018-10-09 RX ADMIN — HEPARIN SODIUM 5000 UNIT(S): 5000 INJECTION INTRAVENOUS; SUBCUTANEOUS at 13:20

## 2018-10-09 RX ADMIN — MEROPENEM 100 MILLIGRAM(S): 1 INJECTION INTRAVENOUS at 13:20

## 2018-10-09 NOTE — PROGRESS NOTE ADULT - SUBJECTIVE AND OBJECTIVE BOX
NEPHROLOGY INTERVAL HPI/OVERNIGHT EVENTS:  10/8 SY  Feeling well.    Denies SOB.  Denies dysuria.  unclear if has increased urine output.    10/9  ESBL in urine switched to Meropenem   feels well  no new events  on isolation    HPI:  84 y/o M PMHx significant for CAD s/p stents, AFib, diastolic CHF, hx of upper GI bleeding, PVD, hypertension, hypertension, severe COPD (O2 dependent), SHAYY on bipap at night, ESRD on HD (MWF), recurrent c. diff who was BIBA from dialysis ([facility]) where he was found to have a post-dialysis fever associated w/ tachycardia. In triage => /min, Tmax 102.7'F. Labs => LA 1.2, BUN/Cr 27/1.59, Alb 2.7, Phos 4.2. UA (+), RVP (-). CT ABD/Pelvis => Trace pericolonic stranding at the level of proximal to mid descending colon. Clinical correlation is advised to assess for mild colitis. No significant wall thickening. Moderate fecal burden. No evidence of small bowel obstruction or extraluminal collection. Re-demonstrated incompletely characterize right hepatic lobe hypodensity during 3 x 1.6 cm for which nonemergent liver protocol MR is advised provided no contraindications. Diffuse bladder wall thickening with trace perivesicular stranding. Correlate with urinalysis to assess for cystitis in appropriate clinical setting. Consider nonemergent retrograde evaluation is concern for bladder malignancy. Uncomplicated cholelithiasis. In the ED the patient was given Dzqnizgx8x IVPB x 1, Nlitpixtia1x IVPB x 1, NS 1.5L x 1.   Seen in ER with daughter at bedside  chart reviewed case d/w HD nurses  possible UTI  blood cx done    MEDICATIONS  (STANDING):  allopurinol 100 milliGRAM(s) Oral daily  aspirin  chewable 81 milliGRAM(s) Oral daily  buDESOnide   0.5 milliGRAM(s) Respule 0.5 milliGRAM(s) Inhalation two times a day  diltiazem    Tablet 30 milliGRAM(s) Oral every 6 hours  doxazosin 8 milliGRAM(s) Oral at bedtime  finasteride 5 milliGRAM(s) Oral daily  heparin  Injectable 5000 Unit(s) SubCutaneous every 8 hours  meropenem  IVPB 500 milliGRAM(s) IV Intermittent every 24 hours  simvastatin 10 milliGRAM(s) Oral at bedtime  vancomycin    Solution 125 milliGRAM(s) Oral every 6 hours    MEDICATIONS  (PRN):  acetaminophen   Tablet .. 650 milliGRAM(s) Oral every 6 hours PRN Temp greater or equal to 38C (100.4F), Mild Pain (1 - 3)  ondansetron Injectable 4 milliGRAM(s) IV Push every 6 hours PRN Nausea    Vital Signs Last 24 Hrs  T(C): 37.1 (09 Oct 2018 08:12), Max: 37.1 (09 Oct 2018 08:12)  T(F): 98.8 (09 Oct 2018 08:12), Max: 98.8 (09 Oct 2018 08:12)  HR: 82 (09 Oct 2018 09:49) (61 - 84)  BP: 125/43 (09 Oct 2018 09:49) (116/50 - 179/48)  BP(mean): --  RR: 18 (09 Oct 2018 09:49) (17 - 18)  SpO2: 98% (09 Oct 2018 05:00) (96% - 100%)      PHYSICAL EXAM:  Alert and comfortable.  GENERAL: No distress  CHEST/LUNG: Clear to aus  HEART: S1S2 RRR  ABDOMEN: soft  EXTREMITIES: trace edema  SKIN:     LABS:                                   8.9    3.58  )-----------( 141      ( 09 Oct 2018 08:25 )             28.8       10-09    149<H>  |  114<H>  |  56<H>  ----------------------------<  110<H>  3.5   |  21<L>  |  2.59<H>    Ca    7.7<L>      09 Oct 2018 08:25  Phos  4.6     10-09    TPro  x   /  Alb  1.6<L>  /  TBili  x   /  DBili  x   /  AST  x   /  ALT  x   /  AlkPhos  x   10-09

## 2018-10-09 NOTE — CONSULT NOTE ADULT - SUBJECTIVE AND OBJECTIVE BOX
Patient is a 83y old  Male who presents with a chief complaint of Fever and palpitations.    HPI:  82 y/o M PMHx significant for CAD s/p stents, AFib, diastolic CHF, hx of upper GI bleeding, PVD, hypertension, hypertension, severe COPD (O2 dependent), SHAYY on bipap at night, ESRD on HD (MWF), recurrent c. diff who was BIBA from dialysis ([facility]) where he was found to have a post-dialysis fever associated w/ tachycardia.  He was being evaluated for fever in the hospital.  Cardiology team consulted for evaluation and risk stratification for anticoagulation for atrial fibrillation.  Pt is not on anticoagulation as an outpt secondary to massive GI bleed in past.  Pt denies any CP or SOB today.      PAST MEDICAL & SURGICAL HISTORY:  Above knee amputation of right lower extremity  Recurrent Clostridium difficile diarrhea  Diastolic CHF  Peripheral vascular disease  Afib  Anemia  CKD (chronic kidney disease)  COPD (chronic obstructive pulmonary disease)  SHAYY (obstructive sleep apnea)  Sepsis, due to unspecified organism: 2/2 poorly healing wounds b/l  Dyspepsia: On moderate exertion.  Sleep apnea, obstructive: Requires home 02 therapy, and treatment with BIPAP  Atelectasis  Pleural effusion, bilateral  Respiratory failure  Peripheral edema  CRI (chronic renal insufficiency)  Gout  Benign prostatic hypertrophy  Spinal stenosis  Hypercholesterolemia  GERD (gastroesophageal reflux disease)  CAD (coronary artery disease)  Hypertension  S/P angioplasty with stent  Cataract of left eye  Prostate: Surgery green light procedure.  S/P rotator cuff surgery: Right  S/P angioplasty      MEDICATIONS  (STANDING):  allopurinol 100 milliGRAM(s) Oral daily  aspirin  chewable 81 milliGRAM(s) Oral daily  buDESOnide   0.5 milliGRAM(s) Respule 0.5 milliGRAM(s) Inhalation two times a day  diltiazem    Tablet 30 milliGRAM(s) Oral every 6 hours  doxazosin 8 milliGRAM(s) Oral at bedtime  finasteride 5 milliGRAM(s) Oral daily  heparin  Injectable 5000 Unit(s) SubCutaneous every 8 hours  meropenem  IVPB 500 milliGRAM(s) IV Intermittent every 24 hours  simvastatin 10 milliGRAM(s) Oral at bedtime  vancomycin    Solution 125 milliGRAM(s) Oral every 6 hours    MEDICATIONS  (PRN):  acetaminophen   Tablet .. 650 milliGRAM(s) Oral every 6 hours PRN Temp greater or equal to 38C (100.4F), Mild Pain (1 - 3)  ondansetron Injectable 4 milliGRAM(s) IV Push every 6 hours PRN Nausea      FAMILY HISTORY:  Family history of colorectal cancer (Father)  Family history of diabetes mellitus (Mother, Sibling)  Family history of hypertension (Mother)      SOCIAL HISTORY:  no smoking or alcohol use recently.     REVIEW OF SYSTEMS:  CONSTITUTIONAL:    No fatigue, malaise, lethargy.  No fever or chills.  HEENT:  Eyes:  No visual changes.     ENT:  No epistaxis.  No sinus pain.    RESPIRATORY:  No cough.  No wheeze.  No hemoptysis.  No shortness of breath.  CARDIOVASCULAR:  No chest pains.  No palpitations. No shortness of breath, No orthopnea or PND.  GASTROINTESTINAL:  No abdominal pain.  No nausea or vomiting.    GENITOURINARY:    No hematuria.    MUSCULOSKELETAL:  No musculoskeletal pain.  No joint swelling.  No arthritis.  NEUROLOGICAL:  No tingling or numbness or weakness.  PSYCHIATRIC:  No confusion  SKIN:  No rashes.    ENDOCRINE:   No polydipsia.   HEMATOLOGIC:  c/o anemia.  No prolonged or excessive bleeding.   ALLERGIC AND IMMUNOLOGIC:  No pruritus.          Vital Signs Last 24 Hrs  T(C): 37.1 (09 Oct 2018 11:57), Max: 37.1 (09 Oct 2018 08:12)  T(F): 98.8 (09 Oct 2018 11:57), Max: 98.8 (09 Oct 2018 08:12)  HR: 89 (09 Oct 2018 12:13) (61 - 89)  BP: 133/54 (09 Oct 2018 12:13) (116/50 - 179/48)  BP(mean): --  RR: 18 (09 Oct 2018 11:57) (18 - 18)  SpO2: 98% (09 Oct 2018 05:00) (97% - 100%)    PHYSICAL EXAM-    Constitutional: The patient appears to be normal, well developed, well nourished and alert and oriented to time, place and person. The patient does not appear acutely ill.     Head: Head is normocephalic and atraumatic.      Neck: No jugular venous distention. No audible carotid bruits. There are strong carotid pulses bilaterally. No JVD.     Cardiovascular: Regular rate and rhythm without S3, S4. No murmurs or rubs are appreciated.      Respiratory: Breath sounds are normal. No rales. No wheezing.    Abdomen: Soft, nontender, nondistended with positive bowel sounds.      Extremity: R above knee amputation and L foot in boot.     Neurologic: The patient is alert and oriented.      Skin: No rash, no obvious lesions noted.      Psychiatric: The patient appears to be emotionally stable.      INTERPRETATION OF TELEMETRY: not on     ECG: Sinus tachycardia.     I&O's Detail      LABS:                        8.9    3.58  )-----------( 141      ( 09 Oct 2018 08:25 )             28.8     10-09    149<H>  |  114<H>  |  56<H>  ----------------------------<  110<H>  3.5   |  21<L>  |  2.59<H>    Ca    7.7<L>      09 Oct 2018 08:25  Phos  4.6     10-09    TPro  x   /  Alb  1.6<L>  /  TBili  x   /  DBili  x   /  AST  x   /  ALT  x   /  AlkPhos  x   10-09            I&O's Summary    BNP  RADIOLOGY & ADDITIONAL STUDIES:

## 2018-10-09 NOTE — CONSULT NOTE ADULT - ASSESSMENT
Atrial fibrillation- paroxysmal-   I called Dr Ibarra, gastroenterologist and he stated that pt did not bleed in the last few months and he is relatively functional in daily life including transferring from chair to bed.  I discussed this with his daughter Anamika as well. She stated that her dad had massive GI bleed in past but recently he did not have any GI bleed after the clips placed in the duodenum and he is more functional than before.  She agreed to low dose anticoagulation and she stated that she already discussed with her father and he is aware of the risk for bleeding.  His CHADvasc score is atleast 4 and he is at high risk for CVA.  If the anticoagulation is contemplated I would recommend coumadin with goal INR 2-2.5 to start with and closely monitor for GI bleed.  He will be ideal candidate for Watchmann device and the evaluation to be done as outpt after discharge.    Other medical issues- Management per primary team.   Thank you for allowing me to participate in the care of this patient. Please feel free to contact me with any questions.

## 2018-10-09 NOTE — PROGRESS NOTE ADULT - ASSESSMENT
hypertension, hypertension, severe COPD (O2 dependent), SHAYY on bipap at night, ESRD on HD (MWF), recurrent c. diff who was BIBA from dialysis ([facility]) where he was found to have a post-dialysis fever associated w/ tachycardia. In triage => /min, Tmax 102.7'F. Labs => LA 1.2, BUN/Cr 27/1.59, Alb 2.7, Phos 4.2. UA (+), RVP (-). CT ABD/Pelvis => Trace pericolonic stranding at the level of proximal to mid descending colon. Clinical correlation is advised to assess for mild colitis. No significant wall thickening. Moderate fecal burden. No evidence of small bowel obstruction or extraluminal collection. Re-demonstrated incompletely characterize right hepatic lobe hypodensity during 3 x 1.6 cm for which nonemergent liver protocol MR is advised provided no contraindications. Diffuse bladder wall thickening with trace perivesicular stranding. Correlate with urinalysis to assess for cystitis in appropriate clinical setting. Consider nonemergent retrograde evaluation is concern for bladder malignancy. Uncomplicated cholelithiasis. In the ED the patient was given Qmrqmfku3l IVPB x 1, Wtdzhxwmzh0c IVPB x 1, NS 1.5L x 1.    vascular note appreciated  Febrile syndrome with change in frequency of urination.    Patient with likely cystitis/urinary tract infection as cause of his symptoms.  Antibiotics    No evidence of active C. difficile at this time although patient is at high risk of recurrent C. difficile. Would empirically treat with vancomycin 4 times a day for the duration that he is on antibiotics and for 1-2 weeks afterwards.    Coronary artery disease, atrial fibrillation–rate control. Continue aspirin.      Anticoagulation was held in the past secondary to significant episodes of GI bleeding.  however, if needed can be restarted with inc risk bleed  last EKG not is A fib, but sinus tach.  However, if pt at ic risk embolic dx and AC needed would consider rerstarting and moitoring pt for anemia and evidence of bleeding, will DW hospitalist  cardiology may need to risk stratify for AC need  MOM left for family    End-stage renal disease–continue hemodialysis

## 2018-10-09 NOTE — PROGRESS NOTE ADULT - SUBJECTIVE AND OBJECTIVE BOX
Patient is a 83y old  Male who presents with a chief complaint of Fever and palpitations (08 Oct 2018 19:24)      HPI:  82 y/o M PMHx significant for CAD s/p stents, AFib, diastolic CHF, hx of upper GI bleeding, PVD, hypertension, hypertension, severe COPD (O2 dependent), SHAYY on bipap at night, ESRD on HD (MWF), recurrent c. diff who was BIBA from dialysis ([facility]) where he was found to have a post-dialysis fever associated w/ tachycardia. In triage => /min, Tmax 102.7'F. Labs => LA 1.2, BUN/Cr 27/1.59, Alb 2.7, Phos 4.2. UA (+), RVP (-). CT ABD/Pelvis => Trace pericolonic stranding at the level of proximal to mid descending colon. Clinical correlation is advised to assess for mild colitis. No significant wall thickening. Moderate fecal burden. No evidence of small bowel obstruction or extraluminal collection. Re-demonstrated incompletely characterize right hepatic lobe hypodensity during 3 x 1.6 cm for which nonemergent liver protocol MR is advised provided no contraindications. Diffuse bladder wall thickening with trace perivesicular stranding. Correlate with urinalysis to assess for cystitis in appropriate clinical setting. Consider nonemergent retrograde evaluation is concern for bladder malignancy. Uncomplicated cholelithiasis. In the ED the patient was given Dcyiksdu2w IVPB x 1, Xudrgntqfp9o IVPB x 1, NS 1.5L x 1. (06 Oct 2018 03:43)      pt comf and vida po   neg n V  on isolation due to pos cx  neg diarrhea    PAST MEDICAL & SURGICAL HISTORY:  Above knee amputation of right lower extremity  Recurrent Clostridium difficile diarrhea  Diastolic CHF  Peripheral vascular disease  Afib  Anemia  CKD (chronic kidney disease)  COPD (chronic obstructive pulmonary disease)  SHAYY (obstructive sleep apnea)  Sepsis, due to unspecified organism: 2/2 poorly healing wounds b/l  Dyspepsia: On moderate exertion.  Sleep apnea, obstructive: Requires home 02 therapy, and treatment with BIPAP  Atelectasis  Pleural effusion, bilateral  Respiratory failure  Peripheral edema  CRI (chronic renal insufficiency)  Gout  Benign prostatic hypertrophy  Spinal stenosis  Hypercholesterolemia  GERD (gastroesophageal reflux disease)  CAD (coronary artery disease)  Hypertension  S/P angioplasty with stent  Cataract of left eye  Prostate: Surgery green light procedure.  S/P rotator cuff surgery: Right  S/P angioplasty      MEDICATIONS  (STANDING):  allopurinol 100 milliGRAM(s) Oral daily  aspirin  chewable 81 milliGRAM(s) Oral daily  buDESOnide   0.5 milliGRAM(s) Respule 0.5 milliGRAM(s) Inhalation two times a day  diltiazem    Tablet 30 milliGRAM(s) Oral every 6 hours  doxazosin 8 milliGRAM(s) Oral at bedtime  finasteride 5 milliGRAM(s) Oral daily  heparin  Injectable 5000 Unit(s) SubCutaneous every 8 hours  meropenem  IVPB 500 milliGRAM(s) IV Intermittent every 24 hours  simvastatin 10 milliGRAM(s) Oral at bedtime  vancomycin    Solution 125 milliGRAM(s) Oral every 6 hours    MEDICATIONS  (PRN):  acetaminophen   Tablet .. 650 milliGRAM(s) Oral every 6 hours PRN Temp greater or equal to 38C (100.4F), Mild Pain (1 - 3)  ondansetron Injectable 4 milliGRAM(s) IV Push every 6 hours PRN Nausea      Allergies    Zosyn (Rash)    Intolerances        SOCIAL HISTORY:NC    FAMILY HISTORY:  Family history of colorectal cancer (Father)  Family history of diabetes mellitus (Mother, Sibling)  Family history of hypertension (Mother)      REVIEW OF SYSTEMS:    CONSTITUTIONAL: No weakness, fevers or chills  EYES/ENT: No visual changes;  No vertigo or throat pain   NECK: No pain or stiffness  RESPIRATORY: No cough, wheezing, hemoptysis; No shortness of breath  CARDIOVASCULAR: No chest pain or palpitations  GENITOURINARY: No dysuria, frequency or hematuria  NEUROLOGICAL: No numbness or weakness  SKIN: No itching, burning, rashes, or lesions   All other review of systems is negative unless indicated above.    Vital Signs Last 24 Hrs  T(C): 36.9 (09 Oct 2018 05:00), Max: 36.9 (09 Oct 2018 05:00)  T(F): 98.5 (09 Oct 2018 05:00), Max: 98.5 (09 Oct 2018 05:00)  HR: 68 (09 Oct 2018 05:00) (61 - 77)  BP: 179/48 (09 Oct 2018 05:00) (121/43 - 179/48)  BP(mean): --  RR: 18 (09 Oct 2018 05:00) (17 - 18)  SpO2: 98% (09 Oct 2018 05:00) (96% - 100%)    PHYSICAL EXAM:    Constitutional: NAD, well-developed  HEENT: EOMI, throat clear  Neck: No LAD, supple  Respiratory: CTA and P  Cardiovascular: S1 and S2, RRR, no M  Gastrointestinal: BS+, soft, NT/ND, neg HSM,  Extremities: No peripheral edema, neg clubing, cyanosis, SP amp    Neurological: A/O x 3, no focal deficits  Psychiatric: Normal mood, normal affect  Skin: No rashes    LABS:  CBC Full  -  ( 08 Oct 2018 11:25 )  WBC Count : 3.85 K/uL  Hemoglobin : 9.7 g/dL  Hematocrit : 31.8 %  Platelet Count - Automated : 148 K/uL  Mean Cell Volume : 94.9 fl  Mean Cell Hemoglobin : 29.0 pg  Mean Cell Hemoglobin Concentration : 30.5 gm/dL  Auto Neutrophil # : x  Auto Lymphocyte # : x  Auto Monocyte # : x  Auto Eosinophil # : x  Auto Basophil # : x  Auto Neutrophil % : x  Auto Lymphocyte % : x  Auto Monocyte % : x  Auto Eosinophil % : x  Auto Basophil % : x    10-08    143  |  110<H>  |  58<H>  ----------------------------<  127<H>  3.9   |  23  |  2.84<H>    Ca    8.5      08 Oct 2018 11:25          10-07 @ 11:19  Hep A Igm Nonreact  Hep A total ab, IgA and M --  Hep B core Ab total --  Hep B core IgM Nonreact  Hep B DNA PCR --  Hep BSAg --  Hep BSAb --  Hep BSAb --  HCV Ab --  HCV RNA Log --  HCV RNA interp --                RADIOLOGY & ADDITIONAL STUDIES:

## 2018-10-09 NOTE — PROGRESS NOTE ADULT - SUBJECTIVE AND OBJECTIVE BOX
CC: Fever and palpitations     HPI:     84 y/o M PMHx significant for CAD s/p stents, AFib, diastolic CHF, hx of upper GI bleeding, PVD, hypertension, hypertension, severe COPD (O2 dependent), SHAYY on bipap at night, ESRD on HD (MWF), recurrent c. diff who was BIBA on 10/5/18  from dialysis ([facility]) where he was found to have a post-dialysis fever associated w/ tachycardia. In triage => /min, Tmax 102.7'F. Labs => LA 1.2, BUN/Cr 27/1.59, Alb 2.7, Phos 4.2. UA (+), RVP (-). CT ABD/Pelvis => Trace pericolonic stranding at the level of proximal to mid descending colon. Clinical correlation is advised to assess for mild colitis. No significant wall thickening. Moderate fecal burden. No evidence of small bowel obstruction or extraluminal collection. Re-demonstrated incompletely characterize right hepatic lobe hypodensity during 3 x 1.6 cm for which nonemergent liver protocol MR is advised provided no contraindications. Diffuse bladder wall thickening with trace perivesicular stranding. Correlate with urinalysis to assess for cystitis in appropriate clinical setting. Consider nonemergent retrograde evaluation is concern for bladder malignancy. Uncomplicated cholelithiasis. In the ED the patient was given Tohklwjt9m IVPB x 1, Itnpkyfwag5m IVPB x 1, NS 1.5L x 1.      INTERVAL HPI/ OVERNIGHT EVENTS:    10/7/18 Pt was seen and examined,  reports feeling well no complains. No fevers, no CP or SOB   10/8 /18 pt seen and examined, denies fever, chills, abdominal pain, tolerating PO diet, denies new sx  10/9/18 - pt seen and examined at HD, denies CP, dyspnea, weakness, afebrile  REVIEW OF SYSTEMS:  All other review of systems is negative unless indicated above.    T(C): 37.1 (10-09-18 @ 11:57), Max: 37.1 (10-09-18 @ 08:12)  T(F): 98.8 (10-09-18 @ 11:57), Max: 98.8 (10-09-18 @ 08:12)  HR: 89 (10-09-18 @ 12:13) (61 - 89)  BP: 133/54 (10-09-18 @ 12:13) (116/50 - 179/48)  RR: 18 (10-09-18 @ 11:57) (18 - 18)  SpO2: 98% (10-09-18 @ 05:00) (97% - 100%)  Wt(kg): --    PHYSICAL EXAM:  General: Well developed; well nourished; in no acute distress  Eyes: PERRLA, EOMI; conjunctiva and sclera clear  Head: Normocephalic; atraumatic  ENMT: No nasal discharge; airway clear  Neck: Supple; non tender; no masses  Respiratory: Good air entry  No wheezes, rales or rhonchi  Cardiovascular: Regular rate and rhythm. S1 and S2 Normal; No murmurs  Gastrointestinal: Soft non-tender non-distended; Normal bowel sounds  Genitourinary: No costovertebral angle tenderness  Extremities: No edema, S/p R AKA  Vascular: Peripheral pulses diminished, LLE  Neurological: Alert and oriented x4  Skin: Warm and dry. No acute rash.  LLE with dressing and in a boot   Lymph Nodes: No acute cervical adenopathy  Musculoskeletal: Normal muscle tone, without deformities  Psychiatric: Cooperative and appropriate        LABS:  10-09    149<H>  |  114<H>  |  56<H>  ----------------------------<  110<H>  3.5   |  21<L>  |  2.59<H>    Ca    7.7<L>      09 Oct 2018 08:25  Phos  4.6     10-09    TPro  x   /  Alb  1.6<L>  /  TBili  x   /  DBili  x   /  AST  x   /  ALT  x   /  AlkPhos  x   10-09                      8.9    3.58  )-----------( 141      ( 09 Oct 2018 08:25 )             28.8     LIVER FUNCTIONS - ( 09 Oct 2018 08:25 )  Alb: 1.6 g/dL / Pro: x     / ALK PHOS: x     / ALT: x     / AST: x     / GGT: x           10-08    143  |  110<H>  |  58<H>  ----------------------------<  127<H>  3.9   |  23  |  2.84<H>    Ca    8.5      08 Oct 2018 11:25                          9.7    3.85  )-----------( 148      ( 08 Oct 2018 11:25 )             31.8                         9.9    4.93  )-----------( 142      ( 07 Oct 2018 11:18 )             32.4     10-07    144  |  112<H>  |  52<H>  ----------------------------<  131<H>  3.7   |  23  |  2.87<H>    Ca    8.5      07 Oct 2018 11:18  Phos  4.5     10-  Mg     1.7     10-    TPro  5.8<L>  /  Alb  2.4<L>  /  TBili  0.4  /  DBili  x   /  AST  24  /  ALT  31  /  AlkPhos  63  10-                       10.4   4.83  )-----------( 143      ( 05 Oct 2018 20:40 )             33.1     05 Oct 2018 20:40    139    |  106    |  27     ----------------------------<  105    3.5     |  23     |  1.59     Ca    8.2        05 Oct 2018 20:40    TPro  6.5    /  Alb  2.7    /  TBili  0.3    /  DBili  x      /  AST  28     /  ALT  36     /  AlkPhos  74     05 Oct 2018 20:40    PT/INR - ( 05 Oct 2018 20:40 )   PT: 12.3 sec;   INR: 1.14 ratio         PTT - ( 05 Oct 2018 20:40 )  PTT:59.5 sec  CAPILLARY BLOOD GLUCOSE        LIVER FUNCTIONS - ( 05 Oct 2018 20:40 )  Alb: 2.7 g/dL / Pro: 6.5 gm/dL / ALK PHOS: 74 U/L / ALT: 36 U/L / AST: 28 U/L / GGT: x           Urinalysis Basic - ( 06 Oct 2018 01:57 )    Color: Yellow / Appearance: very cloudy / S.015 / pH: x  Gluc: x / Ketone: Trace  / Bili: Negative / Urobili: Negative mg/dL   Blood: x / Protein: 500 mg/dL / Nitrite: Negative   Leuk Esterase: Moderate / RBC: 0-2 /HPF / WBC TNTC /HPF   Sq Epi: x / Non Sq Epi: Negative / Bacteria: Few    Culture - Urine (10.06.18 @ 01:57)    Specimen Source: .Urine None    Culture Results:   >100,000 CFU/ml Klebsiella pneumoniae      Culture - Urine (10.06.18 @ 01:57)    -  Amikacin: S <=8    -  Amoxicillin/Clavulanic Acid: S <=8/4    -  Ampicillin: R >16 These ampicillin results predict results for amoxicillin    -  Ampicillin/Sulbactam: R >16/8    -  Aztreonam: R >16    -  Cefazolin: R >16 For uncomplicated UTI with K. pneumoniae, E. coli, or P. mirablis: DAVIDSON <=16 is sensitive and DAVIDSON >=32 is resistant. This also predicts results for oral agents cefaclor, cefdinir, cefpodoxime, cefprozil, cefuroxime axetil, cephalexin and locarbef for uncomplicated UTI. Note that some isolates may be susceptible to these agents while testing resistant to cefazolin.    -  Cefepime: R >16    -  Cefoxitin: S <=4    -  Ceftriaxone: R >32 Enterobacter, Citrobacter, and Serratia may develop resistance during prolonged therapy    -  Ciprofloxacin: S 1    -  Ertapenem: S <=0.5    -  Gentamicin: R >8    -  Imipenem: S <=1    -  Levofloxacin: S <=1    -  Meropenem: S <=1    -  Nitrofurantoin: R >64 Should not be used to treat pyelonephritis    -  Piperacillin/Tazobactam: R <=8    -  Tigecycline: S <=1    -  Tobramycin: I 8    -  Trimethoprim/Sulfamethoxazole: R >2/38    Specimen Source: .Urine None    Culture Results:   >100,000 CFU/ml Klebsiella pneumoniae ESBL  Multiple Morphological Strains    Organism Identification: Klebsiella pneumoniae ESBL    Organism: Klebsiella pneumoniae ESBL    Method Type: DAVIDSON        RADIOLOGY & ADDITIONAL TESTS:    EXAM:  CT ABDOMEN AND PELVIS                        PROCEDURE DATE:  10/05/2018      FINDINGS:    Absence of intravenous contrast limits evaluation for focal lesions,   neoplasm, and vascular pathology.    Lower Thorax: No consolidation or effusion. Incompletely characterized   oblong right lower lobe opacity which nonemergent CT chest follow-up is   advised for better characterization.    Liver: Redemonstrated incompletely characterize right hepatic lobe   hypodensity during 3 x 1.6 cm on image 19 of series 2 for which   nonemergent liver protocol MR is advised provided no contraindications.  Biliary: No dilatation. Uncomplicated cholelithiasis.  Spleen: No suspicious lesions.      Pancreas: No inflammatory changes or ductal dilatation.      Adrenals: Normal.      Kidneys: No hydronephrosis. Stable scattered renal hypodense and   hyperdense lesions.  Vessels: Normal caliber. Atherosclerotic disease of the aorta and its   branches with associated diffuse dense vascular calcifications.   Infrarenal IVC filter noted. Vascular surgical clips are seen in the   right groin region. Right external iliac artery vascular stent identified.    GI tract: Gastric clips again noted at the fundus. Trace pericolonic   stranding at the level of proximal to mid descending colon. Clinical   correlation is advised to assess for mild colitis.No significant wall   thickening. Moderate fecal burden. No evidence of small bowel   obstruction. Normal-appearing appendix.    Peritoneum/retroperitoneum and mesentery: No free air. No organized fluid   collection. No adenopathy.        Pelvic organs/Bladder: Diffuse bladder wall thickening with trace   perivesicular stranding. Correlate with urinalysis to assess for cystitis   in appropriate clinical setting. Consider nonemergent retrograde   evaluation is concern for bladder malignancy. Heterogenous prostate   containing calcifications.    Abdominal wall: A small umbilical hernia containing portion of   obstructive bowelloop noted.  Bones and soft tissues: Multilevel degenerative changes of the spine   noted stable mild compression deformity of T12 vertebral body.   Degenerative changes of bilateral hip joints with nonspecific lucencies   in the right femoral head. Recommend clinical correlation to assess for   avascular necrosis.    IMPRESSION:    Trace pericolonic stranding at the level of proximal to mid descending   colon. Clinical correlation is advised to assess for mild colitis.No   significant wall thickening. Moderate fecal burden. No evidence of small   bowel obstruction or extraluminal collection.     Redemonstrated incompletely characterize right hepatic lobe hypodensity   during 3 x 1.6 cm for which nonemergent liver protocol MR is advised   provided no contraindications.    Diffuse bladder wall thickening with trace perivesicular stranding.   Correlate with urinalysis to assess for cystitis in appropriate clinical   setting. Consider nonemergent retrograde evaluation is concern for   bladder malignancy.    Uncomplicated cholelithiasis.

## 2018-10-09 NOTE — PROGRESS NOTE ADULT - ASSESSMENT
82 y/o M PMHx significant for CAD s/p stents, AFib, diastolic CHF, hx of upper GI bleeding, PVD, hypertension, hypertension, severe COPD (O2 dependent), SHAYY on bipap at night, ESRD on HD ([place]), recurrent c. diff who was admitted for:     1) Fevers due to Klebsiella  UTI/ cystitis ESBL  - fevers resolved   - leukocytosis better   - CT reviewed   - No diarrhea, off isolation   - F/u BCX: NGTD   - UCX: + Klebsiella   - C/w [id number] ->meropenem as per ID   - On PO Vanco for Cdiff PPxs       2)CAD/AFib  -  rate control   - c/w  Diltiazem 30mg po q6h  - cont. ASA 81mg po daily  -cardiology consult - for risk stratification and choice of  AC   - d/w Dr. Carvalho - ok to restart AC with increased risk of bleeding     3)BPH  - c/w  Doxazosin 8mg po qHS  - c/w Dutasteride 0.5mg po qHS    4) COPD/SHAYY  - stable respiratory status   -CXR: neg   -cont. Budesonide nebs  -cont. supplemental O2 PRN   - BiPAP at night    5)ESRD  - C/w HD as per Renal     6) PAD, s/p R AKA, Chronic LLE ulcers   wound care eval  wound orders as per SX   Dr kailee toussaint - for AVF       6)Vte ppx

## 2018-10-10 LAB
INR BLD: 1.06 RATIO — SIGNIFICANT CHANGE UP (ref 0.88–1.16)
PROTHROM AB SERPL-ACNC: 11.5 SEC — SIGNIFICANT CHANGE UP (ref 9.8–12.7)

## 2018-10-10 PROCEDURE — 93970 EXTREMITY STUDY: CPT | Mod: 26

## 2018-10-10 RX ORDER — LORATADINE 10 MG/1
10 TABLET ORAL DAILY
Qty: 0 | Refills: 0 | Status: DISCONTINUED | OUTPATIENT
Start: 2018-10-10 | End: 2018-10-13

## 2018-10-10 RX ORDER — WARFARIN SODIUM 2.5 MG/1
5 TABLET ORAL DAILY
Qty: 0 | Refills: 0 | Status: DISCONTINUED | OUTPATIENT
Start: 2018-10-10 | End: 2018-10-11

## 2018-10-10 RX ORDER — PANTOPRAZOLE SODIUM 20 MG/1
40 TABLET, DELAYED RELEASE ORAL
Qty: 0 | Refills: 0 | Status: DISCONTINUED | OUTPATIENT
Start: 2018-10-10 | End: 2018-10-13

## 2018-10-10 RX ADMIN — HEPARIN SODIUM 5000 UNIT(S): 5000 INJECTION INTRAVENOUS; SUBCUTANEOUS at 14:17

## 2018-10-10 RX ADMIN — Medication 125 MILLIGRAM(S): at 11:42

## 2018-10-10 RX ADMIN — FINASTERIDE 5 MILLIGRAM(S): 5 TABLET, FILM COATED ORAL at 11:42

## 2018-10-10 RX ADMIN — WARFARIN SODIUM 5 MILLIGRAM(S): 2.5 TABLET ORAL at 21:41

## 2018-10-10 RX ADMIN — HEPARIN SODIUM 5000 UNIT(S): 5000 INJECTION INTRAVENOUS; SUBCUTANEOUS at 21:41

## 2018-10-10 RX ADMIN — Medication 8 MILLIGRAM(S): at 21:41

## 2018-10-10 RX ADMIN — HEPARIN SODIUM 5000 UNIT(S): 5000 INJECTION INTRAVENOUS; SUBCUTANEOUS at 05:25

## 2018-10-10 RX ADMIN — Medication 125 MILLIGRAM(S): at 18:25

## 2018-10-10 RX ADMIN — Medication 81 MILLIGRAM(S): at 11:42

## 2018-10-10 RX ADMIN — PANTOPRAZOLE SODIUM 40 MILLIGRAM(S): 20 TABLET, DELAYED RELEASE ORAL at 11:42

## 2018-10-10 RX ADMIN — Medication 0.5 MILLIGRAM(S): at 20:15

## 2018-10-10 RX ADMIN — Medication 0.5 MILLIGRAM(S): at 08:24

## 2018-10-10 RX ADMIN — MEROPENEM 100 MILLIGRAM(S): 1 INJECTION INTRAVENOUS at 14:16

## 2018-10-10 RX ADMIN — Medication 100 MILLIGRAM(S): at 11:42

## 2018-10-10 RX ADMIN — Medication 125 MILLIGRAM(S): at 05:25

## 2018-10-10 RX ADMIN — LORATADINE 10 MILLIGRAM(S): 10 TABLET ORAL at 15:50

## 2018-10-10 RX ADMIN — SIMVASTATIN 10 MILLIGRAM(S): 20 TABLET, FILM COATED ORAL at 21:41

## 2018-10-10 NOTE — SWALLOW BEDSIDE ASSESSMENT ADULT - SWALLOW EVAL: DIAGNOSIS
1) The patient demonstrates Oropharyngeal Swallowing integrity which subjectively appears to be within functional parameters for age. No behavioral aspiration signs exhibited on exam.   2) Pt was alert and interactive but irritable at times. He was able to verbalize during communicative probes and in conversation. Verbalizations were intelligible, fluent, linguistically intact and contextually appropriate. No underlying dysarthria, verbal apraxia or aphasia were evident on exam. At reported communicative baseline.

## 2018-10-10 NOTE — SWALLOW BEDSIDE ASSESSMENT ADULT - DIET PRIOR TO ADMI
The patient was reportedly on a regular texture diet with thin liquid consistencies PTH and was on a regular texture diet with thin liquids when seen by this service during a prior hospitalization at this facility in 3/18.

## 2018-10-10 NOTE — PROGRESS NOTE ADULT - SUBJECTIVE AND OBJECTIVE BOX
Date of service: 10-10-18 @ 11:05    pt seen and examined  feeling better  no fevers  dysuria resolved    ROS: no fever or chills; denies dizziness, no HA, no SOB or cough, no abdominal pain, no diarrhea or constipation;  no legs pain, no rashes      MEDICATIONS  (STANDING):  allopurinol 100 milliGRAM(s) Oral daily  aspirin  chewable 81 milliGRAM(s) Oral daily  buDESOnide   0.5 milliGRAM(s) Respule 0.5 milliGRAM(s) Inhalation two times a day  diltiazem    Tablet 30 milliGRAM(s) Oral every 6 hours  doxazosin 8 milliGRAM(s) Oral at bedtime  finasteride 5 milliGRAM(s) Oral daily  heparin  Injectable 5000 Unit(s) SubCutaneous every 8 hours  meropenem  IVPB 500 milliGRAM(s) IV Intermittent every 24 hours  pantoprazole    Tablet 40 milliGRAM(s) Oral before breakfast  simvastatin 10 milliGRAM(s) Oral at bedtime  vancomycin    Solution 125 milliGRAM(s) Oral every 6 hours      Vital Signs Last 24 Hrs  T(C): 36.7 (10 Oct 2018 05:40), Max: 37.1 (09 Oct 2018 11:57)  T(F): 98 (10 Oct 2018 05:40), Max: 98.8 (09 Oct 2018 11:57)  HR: 70 (10 Oct 2018 08:25) (70 - 89)  BP: 129/40 (10 Oct 2018 05:40) (108/49 - 139/60)  BP(mean): --  RR: 18 (10 Oct 2018 05:40) (18 - 18)  SpO2: 95% (10 Oct 2018 05:40) (95% - 98%)        PE:  Constitutional: frail looking  HEENT: NC/AT, EOMI, PERRLA, conjunctivae clear; ears and nose atraumatic; pharynx benign  Neck: supple; thyroid not palpable  Back: no tenderness  Respiratory: respiratory effort normal; clear to auscultation  Cardiovascular: S1S2 regular, no murmurs  Abdomen: soft,  tender on deep palpation, not distended, positive BS; liver and spleen WNL  Genitourinary: no suprapubic tenderness  Lymphatic: no LN palpable  Musculoskeletal: no muscle tenderness, no joint swelling or tenderness  Extremities: R ext s/p BKA, L foot in brace  Neurological/ Psychiatric: moving all extremities  Skin: no rashes; no palpable lesions    Labs: all available labs reviewed                                              8.9    3.58  )-----------( 141      ( 09 Oct 2018 08:25 )             28.8     10-09    149<H>  |  114<H>  |  56<H>  ----------------------------<  110<H>  3.5   |  21<L>  |  2.59<H>    Ca    7.7<L>      09 Oct 2018 08:25  Phos  4.6     10-09    TPro  x   /  Alb  1.6<L>  /  TBili  x   /  DBili  x   /  AST  x   /  ALT  x   /  AlkPhos  x   10-09      Urinalysis Basic - ( 06 Oct 2018 01:57 )    Color: Yellow / Appearance: very cloudy / S.015 / pH: x  Gluc: x / Ketone: Trace  / Bili: Negative / Urobili: Negative mg/dL   Blood: x / Protein: 500 mg/dL / Nitrite: Negative   Leuk Esterase: Moderate / RBC: 0-2 /HPF / WBC TNTC /HPF   Sq Epi: x / Non Sq Epi: Negative / Bacteria: Few    Culture - Urine (10.06.18 @ 01:57)    -  Amikacin: S <=8    -  Amoxicillin/Clavulanic Acid: S <=8/4    -  Ampicillin: R >16 These ampicillin results predict results for amoxicillin    -  Ampicillin/Sulbactam: R >16/8    -  Aztreonam: R >16    -  Cefazolin: R >16 For uncomplicated UTI with K. pneumoniae, E. coli, or P. mirablis: DAVIDSON <=16 is sensitive and DAVIDSON >=32 is resistant. This also predicts results for oral agents cefaclor, cefdinir, cefpodoxime, cefprozil, cefuroxime axetil, cephalexin and locarbef for uncomplicated UTI. Note that some isolates may be susceptible to these agents while testing resistant to cefazolin.    -  Cefepime: R >16    -  Cefoxitin: S <=4    -  Ceftriaxone: R >32 Enterobacter, Citrobacter, and Serratia may develop resistance during prolonged therapy    -  Ciprofloxacin: S 1    -  Ertapenem: S <=0.5    -  Gentamicin: R >8    -  Imipenem: S <=1    -  Levofloxacin: S <=1    -  Meropenem: S <=1    -  Nitrofurantoin: R >64 Should not be used to treat pyelonephritis    -  Piperacillin/Tazobactam: R <=8    -  Tigecycline: S <=1    -  Tobramycin: I 8    -  Trimethoprim/Sulfamethoxazole: R >    Specimen Source: .Urine None    Culture Results:   >100,000 CFU/ml Klebsiella pneumoniae ESBL  Multiple Morphological Strains    Organism Identification: Klebsiella pneumoniae ESBL    Organism: Klebsiella pneumoniae ESBL    Method Type: DAVIDSON      Radiology: all available radiological tests reviewed  < from: Xray Chest 1 View-PORTABLE IMMEDIATE (10.05.18 @ 21:16) >  EXAM:  XR CHEST PORTABLE IMMED 1V                            PROCEDURE DATE:  10/05/2018          INTERPRETATION:  History: Sepsis dyspnea    Chest:  one view.      Comparison: 2018    AP radiograph of the chest demonstrates no evidence of infiltrate,   pleural effusion or vascular congestion. No atelectasis is seen. RIGHT   central venous catheter tip in SVC. The cardiac silhouette is normal in   size. Vascular calcification involves the aorta. Osseous structures are   intact.    Impression: No active pulmonary disease.        < end of copied text >    < from: CT Abdomen and Pelvis No Cont (10.05.18 @ 21:36) >    EXAM:  CT ABDOMEN AND PELVIS                            PROCEDURE DATE:  10/05/2018          INTERPRETATION:  CT ABDOMEN AND PELVIS WITHOUT CONTRAST    INDICATION: Sepsis and abdominal distention.    TECHNIQUE: Abdominopelvic CT without intravenouscontrast.Images are   reformatted in the sagittal and coronal planes.    COMPARISON: CT abdomen pelvis 2018.    FINDINGS:    Absence of intravenous contrast limits evaluation for focal lesions,   neoplasm, and vascular pathology.    Lower Thorax: No consolidation or effusion. Incompletely characterized   oblong right lower lobe opacity which nonemergent CT chest follow-up is   advised for better characterization.    Liver: Redemonstrated incompletely characterize right hepatic lobe   hypodensity during 3 x 1.6 cm on image 19 of series 2 for which   nonemergent liver protocol MR is advised provided no contraindications.  Biliary: No dilatation. Uncomplicated cholelithiasis.  Spleen: No suspicious lesions.      Pancreas: No inflammatory changes or ductal dilatation.      Adrenals: Normal.      Kidneys: No hydronephrosis. Stable scattered renal hypodense and   hyperdense lesions.  Vessels: Normal caliber. Atherosclerotic disease of the aorta and its   branches with associated diffuse dense vascular calcifications.   Infrarenal IVC filter noted. Vascular surgical clips are seen in the   right groin region. Right external iliac artery vascular stent identified.    GI tract: Gastric clips again noted at the fundus. Trace pericolonic   stranding at the level of proximal to mid descending colon. Clinical   correlation is advised to assess for mild colitis.No significant wall   thickening. Moderate fecal burden. No evidence of small bowel   obstruction. Normal-appearing appendix.    Peritoneum/retroperitoneum and mesentery: No free air. No organized fluid   collection. No adenopathy.        Pelvic organs/Bladder: Diffuse bladder wall thickening with trace   perivesicular stranding. Correlate with urinalysis to assess for cystitis   in appropriate clinical setting. Consider nonemergent retrograde   evaluation is concern for bladder malignancy. Heterogenous prostate   containing calcifications.    Abdominal wall: A small umbilical hernia containing portion of   obstructive bowelloop noted.  Bones and soft tissues: Multilevel degenerative changes of the spine   noted stable mild compression deformity of T12 vertebral body.   Degenerative changes of bilateral hip joints with nonspecific lucencies   in the right femoral head. Recommend clinical correlation to assess for   avascular necrosis.    IMPRESSION:    Trace pericolonic stranding at the level of proximal to mid descending   colon. Clinical correlation is advised to assess for mild colitis.No   significant wall thickening. Moderate fecal burden. No evidence of small   bowel obstruction or extraluminal collection.     Redemonstrated incompletely characterize right hepatic lobe hypodensity   during 3 x 1.6 cm for which nonemergent liver protocol MR is advised   provided no contraindications.    Diffuse bladder wall thickening with trace perivesicular stranding.   Correlate with urinalysis to assess for cystitis in appropriate clinical   setting. Consider nonemergent retrograde evaluation is concern for   bladder malignancy.    Uncomplicated cholelithiasis.    Additional findings as detailed above.      Advanced directives addressed: full resuscitation

## 2018-10-10 NOTE — PROGRESS NOTE ADULT - SUBJECTIVE AND OBJECTIVE BOX
CC: Fever and palpitations     HPI:     82 y/o M PMHx significant for CAD s/p stents, AFib, diastolic CHF, hx of upper GI bleeding, PVD, hypertension, hypertension, severe COPD (O2 dependent), SHAYY on bipap at night, ESRD on HD (MWF), recurrent c. diff who was BIBA on 10/5/18  from dialysis ([facility]) where he was found to have a post-dialysis fever associated w/ tachycardia. In triage => /min, Tmax 102.7'F. Labs => LA 1.2, BUN/Cr 27/1.59, Alb 2.7, Phos 4.2. UA (+), RVP (-). CT ABD/Pelvis => Trace pericolonic stranding at the level of proximal to mid descending colon. Clinical correlation is advised to assess for mild colitis. No significant wall thickening. Moderate fecal burden. No evidence of small bowel obstruction or extraluminal collection. Re-demonstrated incompletely characterize right hepatic lobe hypodensity during 3 x 1.6 cm for which nonemergent liver protocol MR is advised provided no contraindications. Diffuse bladder wall thickening with trace perivesicular stranding. Correlate with urinalysis to assess for cystitis in appropriate clinical setting. Consider nonemergent retrograde evaluation is concern for bladder malignancy. Uncomplicated cholelithiasis. In the ED the patient was given [id number] IVPB x 1, [id number] IVPB x 1, NS 1.5L x 1.    INTERVAL HPI/ OVERNIGHT EVENTS:    10/7/18 Pt was seen and examined,  reports feeling well no complains. No fevers, no CP or SOB   10/8 /18 pt seen and examined, denies fever, chills, abdominal pain, tolerating PO diet, denies new sx  10/9/18 - pt seen and examined at HD, denies CP, dyspnea, weakness, afebrile  10/10/18 - pt seen and examined, denies fever, chills, + nasal congestion, denies new sx  REVIEW OF SYSTEMS:  All other review of systems is negative unless indicated above.    T(C): 36.9 (10-10-18 @ 16:43), Max: 37.1 (10-09-18 @ 22:40)  T(F): 98.5 (10-10-18 @ 16:43), Max: 98.8 (10-09-18 @ 22:40)  HR: 74 (10-10-18 @ 16:43) (70 - 74)  BP: 137/35 (10-10-18 @ 16:43) (105/35 - 137/35)  RR: 19 (10-10-18 @ 16:43) (18 - 20)  SpO2: 98% (10-10-18 @ 16:43) (95% - 100%)  Wt(kg): --  PHYSICAL EXAM:  General: Well developed; well nourished; in no acute distress  Eyes: PERRLA, EOMI; conjunctiva and sclera clear  Head: Normocephalic; atraumatic  ENMT: No nasal discharge; airway clear  Neck: Supple; non tender; no masses  Respiratory: Good air entry  No wheezes, rales or rhonchi  Cardiovascular: Regular rate and rhythm. S1 and S2 Normal; No murmurs  Gastrointestinal: Soft non-tender non-distended; Normal bowel sounds  Genitourinary: No costovertebral angle tenderness  Extremities: No edema, S/p R AKA  Vascular: Peripheral pulses diminished, LLE  Neurological: Alert and oriented x4  Skin: Warm and dry. No acute rash.  LLE with dressing and in a boot   Lymph Nodes: No acute cervical adenopathy  Musculoskeletal: Normal muscle tone, without deformities  Psychiatric: Cooperative and appropriate        LABS:  10-09    149<H>  |  114<H>  |  56<H>  ----------------------------<  110<H>  3.5   |  21<L>  |  2.59<H>    Ca    7.7<L>      09 Oct 2018 08:25  Phos  4.6     10-09    TPro  x   /  Alb  1.6<L>  /  TBili  x   /  DBili  x   /  AST  x   /  ALT  x   /  AlkPhos  x   10-09                      8.9    3.58  )-----------( 141      ( 09 Oct 2018 08:25 )             28.8     LIVER FUNCTIONS - ( 09 Oct 2018 08:25 )  Alb: 1.6 g/dL / Pro: x     / ALK PHOS: x     / ALT: x     / AST: x     / GGT: x           10-08    143  |  110<H>  |  58<H>  ----------------------------<  127<H>  3.9   |  23  |  2.84<H>    Ca    8.5      08 Oct 2018 11:25                          9.7    3.85  )-----------( 148      ( 08 Oct 2018 11:25 )             31.8                         9.9    4.93  )-----------( 142      ( 07 Oct 2018 11:18 )             32.4     10-07    144  |  112<H>  |  52<H>  ----------------------------<  131<H>  3.7   |  23  |  2.87<H>    Ca    8.5      07 Oct 2018 11:18  Phos  4.5     10-06  Mg     1.7     10-    TPro  5.8<L>  /  Alb  2.4<L>  /  TBili  0.4  /  DBili  x   /  AST  24  /  ALT  31  /  AlkPhos  63  10-06                       10.4   4.83  )-----------( 143      ( 05 Oct 2018 20:40 )             33.1     05 Oct 2018 20:40    139    |  106    |  27     ----------------------------<  105    3.5     |  23     |  1.59     Ca    8.2        05 Oct 2018 20:40    TPro  6.5    /  Alb  2.7    /  TBili  0.3    /  DBili  x      /  AST  28     /  ALT  36     /  AlkPhos  74     05 Oct 2018 20:40    PT/INR - ( 05 Oct 2018 20:40 )   PT: 12.3 sec;   INR: 1.14 ratio         PTT - ( 05 Oct 2018 20:40 )  PTT:59.5 sec  CAPILLARY BLOOD GLUCOSE        LIVER FUNCTIONS - ( 05 Oct 2018 20:40 )  Alb: 2.7 g/dL / Pro: 6.5 gm/dL / ALK PHOS: 74 U/L / ALT: 36 U/L / AST: 28 U/L / GGT: x           Urinalysis Basic - ( 06 Oct 2018 01:57 )    Color: Yellow / Appearance: very cloudy / S.015 / pH: x  Gluc: x / Ketone: Trace  / Bili: Negative / Urobili: Negative mg/dL   Blood: x / Protein: 500 mg/dL / Nitrite: Negative   Leuk Esterase: Moderate / RBC: 0-2 /HPF / WBC TNTC /HPF   Sq Epi: x / Non Sq Epi: Negative / Bacteria: Few    Culture - Urine (10.06.18 @ 01:57)    Specimen Source: .Urine None    Culture Results:   >100,000 CFU/ml Klebsiella pneumoniae      Culture - Urine (10.06.18 @ 01:57)    -  Amikacin: S <=8    -  Amoxicillin/Clavulanic Acid: S <=8/4    -  Ampicillin: R >16 These ampicillin results predict results for amoxicillin    -  Ampicillin/Sulbactam: R >16/8    -  Aztreonam: R >16    -  Cefazolin: R >16 For uncomplicated UTI with K. pneumoniae, E. coli, or P. mirablis: DAVIDSON <=16 is sensitive and DAVIDSON >=32 is resistant. This also predicts results for oral agents cefaclor, cefdinir, cefpodoxime, cefprozil, cefuroxime axetil, cephalexin and locarbef for uncomplicated UTI. Note that some isolates may be susceptible to these agents while testing resistant to cefazolin.    -  Cefepime: R >16    -  Cefoxitin: S <=4    -  Ceftriaxone: R >32 Enterobacter, Citrobacter, and Serratia may develop resistance during prolonged therapy    -  Ciprofloxacin: S 1    -  Ertapenem: S <=0.5    -  Gentamicin: R >8    -  Imipenem: S <=1    -  Levofloxacin: S <=1    -  Meropenem: S <=1    -  Nitrofurantoin: R >64 Should not be used to treat pyelonephritis    -  Piperacillin/Tazobactam: R <=8    -  Tigecycline: S <=1    -  Tobramycin: I 8    -  Trimethoprim/Sulfamethoxazole: R >2/38    Specimen Source: .Urine None    Culture Results:   >100,000 CFU/ml Klebsiella pneumoniae ESBL  Multiple Morphological Strains    Organism Identification: Klebsiella pneumoniae ESBL    Organism: Klebsiella pneumoniae ESBL    Method Type: DAVIDSON        RADIOLOGY & ADDITIONAL TESTS:    EXAM:  CT ABDOMEN AND PELVIS                        PROCEDURE DATE:  10/05/2018      FINDINGS:    Absence of intravenous contrast limits evaluation for focal lesions,   neoplasm, and vascular pathology.    Lower Thorax: No consolidation or effusion. Incompletely characterized   oblong right lower lobe opacity which nonemergent CT chest follow-up is   advised for better characterization.    Liver: Redemonstrated incompletely characterize right hepatic lobe   hypodensity during 3 x 1.6 cm on image 19 of series 2 for which   nonemergent liver protocol MR is advised provided no contraindications.  Biliary: No dilatation. Uncomplicated cholelithiasis.  Spleen: No suspicious lesions.      Pancreas: No inflammatory changes or ductal dilatation.      Adrenals: Normal.      Kidneys: No hydronephrosis. Stable scattered renal hypodense and   hyperdense lesions.  Vessels: Normal caliber. Atherosclerotic disease of the aorta and its   branches with associated diffuse dense vascular calcifications.   Infrarenal IVC filter noted. Vascular surgical clips are seen in the   right groin region. Right external iliac artery vascular stent identified.    GI tract: Gastric clips again noted at the fundus. Trace pericolonic   stranding at the level of proximal to mid descending colon. Clinical   correlation is advised to assess for mild colitis.No significant wall   thickening. Moderate fecal burden. No evidence of small bowel   obstruction. Normal-appearing appendix.    Peritoneum/retroperitoneum and mesentery: No free air. No organized fluid   collection. No adenopathy.        Pelvic organs/Bladder: Diffuse bladder wall thickening with trace   perivesicular stranding. Correlate with urinalysis to assess for cystitis   in appropriate clinical setting. Consider nonemergent retrograde   evaluation is concern for bladder malignancy. Heterogenous prostate   containing calcifications.    Abdominal wall: A small umbilical hernia containing portion of   obstructive bowelloop noted.  Bones and soft tissues: Multilevel degenerative changes of the spine   noted stable mild compression deformity of T12 vertebral body.   Degenerative changes of bilateral hip joints with nonspecific lucencies   in the right femoral head. Recommend clinical correlation to assess for   avascular necrosis.    IMPRESSION:    Trace pericolonic stranding at the level of proximal to mid descending   colon. Clinical correlation is advised to assess for mild colitis.No   significant wall thickening. Moderate fecal burden. No evidence of small   bowel obstruction or extraluminal collection.     Redemonstrated incompletely characterize right hepatic lobe hypodensity   during 3 x 1.6 cm for which nonemergent liver protocol MR is advised   provided no contraindications.    Diffuse bladder wall thickening with trace perivesicular stranding.   Correlate with urinalysis to assess for cystitis in appropriate clinical   setting. Consider nonemergent retrograde evaluation is concern for   bladder malignancy.    Uncomplicated cholelithiasis.

## 2018-10-10 NOTE — PROGRESS NOTE ADULT - ASSESSMENT
84 y/o M PMHx significant for CAD s/p stents, AFib, diastolic CHF, hx of upper GI bleeding, PVD, hypertension, hypertension, severe COPD (O2 dependent), SHAYY on bipap at night, ESRD on HD ([facility]), recurrent c. diff who was admitted for:     1) Fevers due to Klebsiella  UTI/ cystitis ESBL  - fevers resolved   - leukocytosis better   - CT reviewed   - No diarrhea, off isolation   - F/u BCX: NGTD   - UCX: + Klebsiella   - C/w [id number] ->meropenem as per ID   - On PO Vanco for Cdiff PPxs       2)CAD/AFib  -  rate control   - c/w  Diltiazem 30mg po q6h  - cont. ASA 81mg po daily  -cardiology consult - for risk stratification and choice of  AC   - d/w Dr. Carvalho - ok to restart AC with increased risk of bleeding     3)BPH  - c/w  Doxazosin 8mg po qHS  - c/w Dutasteride 0.5mg po qHS    4) COPD/SHAYY  - stable respiratory status   -CXR: neg   -cont. Budesonide nebs  -cont. supplemental O2 PRN   - BiPAP at night    5)ESRD  - C/w HD as per Renal     6) PAD, s/p R AKA, Chronic LLE ulcers   wound care eval  wound orders as per SX   Dr kailee toussaint - for AVF     7) Allergic rhinitis  - restart claritin        6)Vte ppx 84 y/o M PMHx significant for CAD s/p stents, AFib, diastolic CHF, hx of upper GI bleeding, PVD, hypertension, hypertension, severe COPD (O2 dependent), SHAYY on bipap at night, ESRD on HD ([facility]), recurrent c. diff who was admitted for:     1) Fevers due to Klebsiella  UTI/ cystitis ESBL  - fevers resolved   - leukocytosis better   - CT reviewed   - No diarrhea, off isolation   - F/u BCX: NGTD   - UCX: + Klebsiella   - C/w [id number] ->meropenem as per ID   - On PO Vanco for Cdiff PPxs       2)CAD/AFib  -  rate control   - c/w  Diltiazem 30mg po q6h  - cont. ASA 81mg po daily  - restarted on coumadin 5 mg, pt/inr daily  -cardiology consult - for risk stratification and choice of  AC   - d/w Dr. Carvalho - ok to restart AC with increased risk of bleeding     3)BPH  - c/w  Doxazosin 8mg po qHS  - c/w Dutasteride 0.5mg po qHS    4) COPD/SHAYY  - stable respiratory status   -CXR: neg   -cont. Budesonide nebs  -cont. supplemental O2 PRN   - BiPAP at night    5)ESRD  - C/w HD as per Renal     6) PAD, s/p R AKA, Chronic LLE ulcers   wound care eval  wound orders as per SX   Dr kailee toussaint - for AVF     7) Allergic rhinitis  - restart claritin        6)Vte ppx

## 2018-10-10 NOTE — SWALLOW BEDSIDE ASSESSMENT ADULT - COMMENTS
The patient was admitted to . Hospital course is notable for palpitations, fevers, positive UA and cystitis. This profile is superimposed upon a history of a prior hospitalization at this facility in 3/18 with left sided weakness due to a right CVA. This profile is superimposed upon a history of recent LLE cellulitis, PVD s/p right BKA, A-Fib, CHF, COPD, SHAYY, HLD, iron deficiency anemia, gout, BPH, ESRD,, past GIB and prior RUE/RLE DVT's in the past. See below for additional prior medical information. The patient was admitted to . Hospital course is notable for palpitations, fevers, positive UA and cystitis. This profile is superimposed upon a history of a prior hospitalization at this facility in 3/18 with left sided weakness due to a right CVA. Other past medical history is remarkable for fairly  recent LLE cellulitis, PVD s/p right BKA, A-Fib, CHF, COPD, SHAYY, HLD, iron deficiency anemia, gout, BPH, ESRD, past GIB and prior RUE/RLE DVT's in the past. See below for additional prior medical information.

## 2018-10-10 NOTE — PROGRESS NOTE ADULT - ASSESSMENT
84 y/o M PMHx significant for CAD s/p stents, AFib, diastolic CHF, hx of upper GI bleeding, PVD, hypertension, hypertension, severe COPD (O2 dependent), SHAYY on bipap at night, ESRD on HD (MWF), recurrent c. diff who was BIBA from dialysis ([facility]) where he was found to have a post-dialysis fever associated w/ tachycardia. In triage => /min, Tmax 102.7'F.   Seen in ER with daughter at bedside  chart reviewed case d/w HD nurses  possible UTI  blood cx done    a/p  ESRD on HD   dialysis via cvc  possible UTI  evaluate for bacteremia as well  duplex of UE for avf    10/7   febrile syndrome  cystitis  on Cefepime IV  Vanco po  HD MWF  vein mapping UE  Dr Clement evaluating for possible avf    10/8 SY  --ARYA/CKD   For now HD dependent at [facility] tx schedule.  Will plan for HD in am.  Creat trending down.   Continue to monitor for renal recovery.  --UTI with resistant Klebsiella.  Started on Meropenem today.  --Plan for AVF once ID cleared and if pt does remain on hd this admission.    10/9  ESBL in urine switched to Meropenem   feels well  no new events  on isolation  4 k bath    10/10 MK  - arya/ckd for next hd on friday, remains hd dependent  - esbl uti: on meropenem  - avf after ID clearance, fu trend of the cr

## 2018-10-10 NOTE — SWALLOW BEDSIDE ASSESSMENT ADULT - MODE OF PRESENTATION
Pt can feed self with right hand. His LUE is weak, Pt can feed self. His LUE is somewhat weak due to a prior stroke but he was able to feed self with both hands.

## 2018-10-10 NOTE — SWALLOW BEDSIDE ASSESSMENT ADULT - SLP GENERAL OBSERVATIONS
Pt seen at bedside. On encounter, Pt was alert and interactive but irritable at times. He was able to verbalize during communicative probes and in conversation. Verbalizations were intelligible, fluent, linguistically intact and contextually appropriate. No underlying dysarthria, verbal apraxia or aphasia were evident on exam. At reported communicative baseline.  Note that pt denied Dysphagia. Pt seen at bedside. On encounter, he was alert and interactive but irritable at times. He was able to verbalize during communicative probes and in conversation. Verbalizations were intelligible, fluent, linguistically intact and contextually appropriate. No underlying dysarthria, verbal apraxia or aphasia were evident on exam. At reported communicative baseline.  Note that pt denied Dysphagia.

## 2018-10-10 NOTE — PROGRESS NOTE ADULT - ASSESSMENT
82 y/o M PMHx significant for CAD s/p stents, AFib, diastolic CHF, hx of upper GI bleeding, PVD, hypertension, hypertension, severe COPD (O2 dependent), SHAYY on bipap at night, ESRD on HD (MWF), recurrent c. diff who was BIBA from dialysis ([facility]) where he was found to have a post-dialysis fever associated w/ tachycardia. In triage => /min, Tmax 102.7'F. Labs => LA 1.2, BUN/Cr 27/1.59, Alb 2.7, Phos 4.2. UA (+), RVP (-). CT ABD/Pelvis => Trace pericolonic stranding at the level of proximal to mid descending colon. Clinical correlation is advised to assess for mild colitis.  Diffuse bladder wall thickening with trace perivesicular stranding. in the ED the patient was given Lgudlzta9g IVPB x 1, Vxdtmeinkr0j IVPB x 1, NS 1.5L x 1.     1. febrile syndrome/cystitis/hx of CDAD/ESRD  - improving   - s/p cefepime #2  - urine cx growing KLPN ESBL >100,000  - on meropenem 500mg daily #3/5  - plan for 5 days  - on oral vancomycin [id number] for c diff prophylaxis while remains on abx #5  - monitor temps  - tolerating abx well so far; no side effects noted  - reason for abx use and side effects reviewed with patient  - supportive care  - f/u cbc

## 2018-10-10 NOTE — PROGRESS NOTE ADULT - ASSESSMENT
hypertension, hypertension, severe COPD (O2 dependent), SHAYY on bipap at night, ESRD on HD (MWF), recurrent c. diff who was BIBA from dialysis ([facility]) where he was found to have a post-dialysis fever associated w/ tachycardia. In triage => /min, Tmax 102.7'F. Labs => LA 1.2, BUN/Cr 27/1.59, Alb 2.7, Phos 4.2. UA (+), RVP (-). CT ABD/Pelvis => Trace pericolonic stranding at the level of proximal to mid descending colon. Clinical correlation is advised to assess for mild colitis. No significant wall thickening. Moderate fecal burden. No evidence of small bowel obstruction or extraluminal collection. Re-demonstrated incompletely characterize right hepatic lobe hypodensity during 3 x 1.6 cm for which nonemergent liver protocol MR is advised provided no contraindications. Diffuse bladder wall thickening with trace perivesicular stranding. Correlate with urinalysis to assess for cystitis in appropriate clinical setting. Consider nonemergent retrograde evaluation is concern for bladder malignancy. Uncomplicated cholelithiasis. In the ED the patient was given Ujiijdqt0c IVPB x 1, Xzwesycatx8m IVPB x 1, NS 1.5L x 1.      Patient with likely cystitis/urinary tract infection as cause of his symptoms.  Antibiotics    No evidence of active C. difficile at this time although patient is at high risk of recurrent C. difficile. Would empirically treat with vancomycin 4 times a day for the duration that he is on antibiotics and for 1-2 weeks afterwards.    Coronary artery disease, atrial fibrillation–rate control. Continue aspirin.      Anticoagulation was held in the past secondary to significant episodes of GI bleeding.  however,   Discussed with hospitalist cardiologist patient and daughter regarding increased risk of bleeding and anticoagulation however, patient also had high risk for strokes.  After long discussion, family and patient agreed with anticoagulation.  Would resume Protonix  Anticoagulation management per cardiologist.  Risk of bleeding versus risk of cardio embolic events reviewed with patient and daughter

## 2018-10-10 NOTE — PROGRESS NOTE ADULT - SUBJECTIVE AND OBJECTIVE BOX
Patient is a 83y old  Male who presents with a chief complaint of Fever and palpitations (09 Oct 2018 17:20)      HPI:  84 y/o M PMHx significant for CAD s/p stents, AFib, diastolic CHF, hx of upper GI bleeding, PVD, hypertension, hypertension, severe COPD (O2 dependent), SHAYY on bipap at night, ESRD on HD (MWF), recurrent c. diff who was BIBA from dialysis ([facility]) where he was found to have a post-dialysis fever associated w/ tachycardia. In triage => /min, Tmax 102.7'F. Labs => LA 1.2, BUN/Cr 27/1.59, Alb 2.7, Phos 4.2. UA (+), RVP (-). CT ABD/Pelvis => Trace pericolonic stranding at the level of proximal to mid descending colon. Clinical correlation is advised to assess for mild colitis. No significant wall thickening. Moderate fecal burden. No evidence of small bowel obstruction or extraluminal collection. Re-demonstrated incompletely characterize right hepatic lobe hypodensity during 3 x 1.6 cm for which nonemergent liver protocol MR is advised provided no contraindications. Diffuse bladder wall thickening with trace perivesicular stranding. Correlate with urinalysis to assess for cystitis in appropriate clinical setting. Consider nonemergent retrograde evaluation is concern for bladder malignancy. Uncomplicated cholelithiasis. In the ED the patient was given Awiquyij1z IVPB x 1, Cwdakpgffh5v IVPB x 1, NS 1.5L x 1. (06 Oct 2018 03:43)    Patient comfortable. Negative nausea or vomiting. Has some weakness.  On isolation secondary positive culture. Negative diarrhea  Case discussed with hospitalist as well as with cardiologist.  Additionally, history further obtained from daughter and case discussed with her as well.        PAST MEDICAL & SURGICAL HISTORY:  Above knee amputation of right lower extremity  Recurrent Clostridium difficile diarrhea  Diastolic CHF  Peripheral vascular disease  Afib  Anemia  CKD (chronic kidney disease)  COPD (chronic obstructive pulmonary disease)  SHAYY (obstructive sleep apnea)  Sepsis, due to unspecified organism: 2/2 poorly healing wounds b/l  Dyspepsia: On moderate exertion.  Sleep apnea, obstructive: Requires home 02 therapy, and treatment with BIPAP  Atelectasis  Pleural effusion, bilateral  Respiratory failure  Peripheral edema  CRI (chronic renal insufficiency)  Gout  Benign prostatic hypertrophy  Spinal stenosis  Hypercholesterolemia  GERD (gastroesophageal reflux disease)  CAD (coronary artery disease)  Hypertension  S/P angioplasty with stent  Cataract of left eye  Prostate: Surgery green light procedure.  S/P rotator cuff surgery: Right  S/P angioplasty      MEDICATIONS  (STANDING):  allopurinol 100 milliGRAM(s) Oral daily  aspirin  chewable 81 milliGRAM(s) Oral daily  buDESOnide   0.5 milliGRAM(s) Respule 0.5 milliGRAM(s) Inhalation two times a day  diltiazem    Tablet 30 milliGRAM(s) Oral every 6 hours  doxazosin 8 milliGRAM(s) Oral at bedtime  finasteride 5 milliGRAM(s) Oral daily  heparin  Injectable 5000 Unit(s) SubCutaneous every 8 hours  meropenem  IVPB 500 milliGRAM(s) IV Intermittent every 24 hours  simvastatin 10 milliGRAM(s) Oral at bedtime  vancomycin    Solution 125 milliGRAM(s) Oral every 6 hours    MEDICATIONS  (PRN):  acetaminophen   Tablet .. 650 milliGRAM(s) Oral every 6 hours PRN Temp greater or equal to 38C (100.4F), Mild Pain (1 - 3)  ondansetron Injectable 4 milliGRAM(s) IV Push every 6 hours PRN Nausea      Allergies    Zosyn (Rash)    Intolerances        SOCIAL HISTORY:NC    FAMILY HISTORY:  Family history of colorectal cancer (Father)  Family history of diabetes mellitus (Mother, Sibling)  Family history of hypertension (Mother)      REVIEW OF SYSTEMS:    CONSTITUTIONAL: No weakness, fevers or chills  EYES/ENT: No visual changes;  No vertigo or throat pain   NECK: No pain or stiffness  RESPIRATORY: No cough, wheezing, hemoptysis; No shortness of breath  CARDIOVASCULAR: No chest pain or palpitations  GENITOURINARY: No dysuria, frequency or hematuria  NEUROLOGICAL: No numbness or weakness  SKIN: No itching, burning, rashes, or lesions   All other review of systems is negative unless indicated above.    Vital Signs Last 24 Hrs  T(C): 36.7 (10 Oct 2018 05:40), Max: 37.1 (09 Oct 2018 11:57)  T(F): 98 (10 Oct 2018 05:40), Max: 98.8 (09 Oct 2018 11:57)  HR: 70 (10 Oct 2018 05:40) (70 - 89)  BP: 129/40 (10 Oct 2018 05:40) (108/49 - 139/60)  BP(mean): --  RR: 18 (10 Oct 2018 05:40) (18 - 18)  SpO2: 95% (10 Oct 2018 05:40) (95% - 98%)    PHYSICAL EXAM:    Constitutional: NAD, well-developed  HEENT: EOMI, throat clear  Neck: No LAD, supple  Respiratory: CTA and P  Cardiovascular: S1 and S2, RRR, no M  Gastrointestinal: BS+, soft, NT/ND, neg HSM,  Extremities: No peripheral edema, neg clubing, cyanosis  Vascular: 2+ peripheral pulses  Neurological: A/O x 3, no focal deficits  Psychiatric: Normal mood, normal affect  Skin: No rashes    LABS:  CBC Full  -  ( 09 Oct 2018 08:25 )  WBC Count : 3.58 K/uL  Hemoglobin : 8.9 g/dL  Hematocrit : 28.8 %  Platelet Count - Automated : 141 K/uL  Mean Cell Volume : 96.0 fl  Mean Cell Hemoglobin : 29.7 pg  Mean Cell Hemoglobin Concentration : 30.9 gm/dL  Auto Neutrophil # : x  Auto Lymphocyte # : x  Auto Monocyte # : x  Auto Eosinophil # : x  Auto Basophil # : x  Auto Neutrophil % : x  Auto Lymphocyte % : x  Auto Monocyte % : x  Auto Eosinophil % : x  Auto Basophil % : x    10-09    149<H>  |  114<H>  |  56<H>  ----------------------------<  110<H>  3.5   |  21<L>  |  2.59<H>    Ca    7.7<L>      09 Oct 2018 08:25  Phos  4.6     10-09    TPro  x   /  Alb  1.6<L>  /  TBili  x   /  DBili  x   /  AST  x   /  ALT  x   /  AlkPhos  x   10-09    PT/INR - ( 10 Oct 2018 06:51 )   PT: 11.5 sec;   INR: 1.06 ratio             10-07 @ 11:19  Hep A Igm Nonreact  Hep A total ab, IgA and M --  Hep B core Ab total --  Hep B core IgM Nonreact  Hep B DNA PCR --  Hep BSAg --  Hep BSAb --  Hep BSAb --  HCV Ab --  HCV RNA Log --  HCV RNA interp --                RADIOLOGY & ADDITIONAL STUDIES:

## 2018-10-10 NOTE — PROGRESS NOTE ADULT - SUBJECTIVE AND OBJECTIVE BOX
NEPHROLOGY INTERVAL HPI/OVERNIGHT EVENTS:  no c/o tolerating po well      MEDICATIONS  (STANDING):  allopurinol 100 milliGRAM(s) Oral daily  aspirin  chewable 81 milliGRAM(s) Oral daily  buDESOnide   0.5 milliGRAM(s) Respule 0.5 milliGRAM(s) Inhalation two times a day  diltiazem    Tablet 30 milliGRAM(s) Oral every 6 hours  doxazosin 8 milliGRAM(s) Oral at bedtime  finasteride 5 milliGRAM(s) Oral daily  heparin  Injectable 5000 Unit(s) SubCutaneous every 8 hours  loratadine 10 milliGRAM(s) Oral daily  meropenem  IVPB 500 milliGRAM(s) IV Intermittent every 24 hours  pantoprazole    Tablet 40 milliGRAM(s) Oral before breakfast  simvastatin 10 milliGRAM(s) Oral at bedtime  vancomycin    Solution 125 milliGRAM(s) Oral every 6 hours    MEDICATIONS  (PRN):  acetaminophen   Tablet .. 650 milliGRAM(s) Oral every 6 hours PRN Temp greater or equal to 38C (100.4F), Mild Pain (1 - 3)  ondansetron Injectable 4 milliGRAM(s) IV Push every 6 hours PRN Nausea      Allergies    Zosyn (Rash)    Intolerances        I&O's Detail          Vital Signs Last 24 Hrs  T(C): 36.7 (10 Oct 2018 05:40), Max: 37.1 (09 Oct 2018 22:40)  T(F): 98 (10 Oct 2018 05:40), Max: 98.8 (09 Oct 2018 22:40)  HR: 73 (10 Oct 2018 12:23) (70 - 86)  BP: 105/35 (10 Oct 2018 12:23) (105/35 - 131/36)  BP(mean): --  RR: 20 (10 Oct 2018 12:23) (18 - 20)  SpO2: 100% (10 Oct 2018 12:23) (95% - 100%)  Daily     Daily     PHYSICAL EXAM:  General: alert. awake Ox3  HEENT: MMM  CV: s1s2 rrr  LUNGS: B/L CTA  EXT: no edema    LABS:                        8.9    3.58  )-----------( 141      ( 09 Oct 2018 08:25 )             28.8     10-09    149<H>  |  114<H>  |  56<H>  ----------------------------<  110<H>  3.5   |  21<L>  |  2.59<H>    Ca    7.7<L>      09 Oct 2018 08:25  Phos  4.6     10-09    TPro  x   /  Alb  1.6<L>  /  TBili  x   /  DBili  x   /  AST  x   /  ALT  x   /  AlkPhos  x   10-09    PT/INR - ( 10 Oct 2018 06:51 )   PT: 11.5 sec;   INR: 1.06 ratio

## 2018-10-11 LAB
ANION GAP SERPL CALC-SCNC: 12 MMOL/L — SIGNIFICANT CHANGE UP (ref 5–17)
BLD GP AB SCN SERPL QL: SIGNIFICANT CHANGE UP
BUN SERPL-MCNC: 41 MG/DL — HIGH (ref 7–23)
CALCIUM SERPL-MCNC: 7.7 MG/DL — LOW (ref 8.5–10.1)
CHLORIDE SERPL-SCNC: 106 MMOL/L — SIGNIFICANT CHANGE UP (ref 96–108)
CO2 SERPL-SCNC: 22 MMOL/L — SIGNIFICANT CHANGE UP (ref 22–31)
CREAT SERPL-MCNC: 2.64 MG/DL — HIGH (ref 0.5–1.3)
CULTURE RESULTS: SIGNIFICANT CHANGE UP
CULTURE RESULTS: SIGNIFICANT CHANGE UP
GLUCOSE SERPL-MCNC: 79 MG/DL — SIGNIFICANT CHANGE UP (ref 70–99)
HCT VFR BLD CALC: 29.5 % — LOW (ref 39–50)
HGB BLD-MCNC: 9.2 G/DL — LOW (ref 13–17)
INR BLD: 1.08 RATIO — SIGNIFICANT CHANGE UP (ref 0.88–1.16)
MCHC RBC-ENTMCNC: 29.2 PG — SIGNIFICANT CHANGE UP (ref 27–34)
MCHC RBC-ENTMCNC: 31.2 GM/DL — LOW (ref 32–36)
MCV RBC AUTO: 93.7 FL — SIGNIFICANT CHANGE UP (ref 80–100)
NRBC # BLD: 0 /100 WBCS — SIGNIFICANT CHANGE UP (ref 0–0)
PLATELET # BLD AUTO: 146 K/UL — LOW (ref 150–400)
POTASSIUM SERPL-MCNC: 3.7 MMOL/L — SIGNIFICANT CHANGE UP (ref 3.5–5.3)
POTASSIUM SERPL-SCNC: 3.7 MMOL/L — SIGNIFICANT CHANGE UP (ref 3.5–5.3)
PROTHROM AB SERPL-ACNC: 11.7 SEC — SIGNIFICANT CHANGE UP (ref 9.8–12.7)
RBC # BLD: 3.15 M/UL — LOW (ref 4.2–5.8)
RBC # FLD: 14.8 % — HIGH (ref 10.3–14.5)
SODIUM SERPL-SCNC: 140 MMOL/L — SIGNIFICANT CHANGE UP (ref 135–145)
SPECIMEN SOURCE: SIGNIFICANT CHANGE UP
SPECIMEN SOURCE: SIGNIFICANT CHANGE UP
TYPE + AB SCN PNL BLD: SIGNIFICANT CHANGE UP
WBC # BLD: 4.57 K/UL — SIGNIFICANT CHANGE UP (ref 3.8–10.5)
WBC # FLD AUTO: 4.57 K/UL — SIGNIFICANT CHANGE UP (ref 3.8–10.5)

## 2018-10-11 RX ADMIN — Medication 0.5 MILLIGRAM(S): at 08:03

## 2018-10-11 RX ADMIN — Medication 125 MILLIGRAM(S): at 12:18

## 2018-10-11 RX ADMIN — HEPARIN SODIUM 5000 UNIT(S): 5000 INJECTION INTRAVENOUS; SUBCUTANEOUS at 05:20

## 2018-10-11 RX ADMIN — HEPARIN SODIUM 5000 UNIT(S): 5000 INJECTION INTRAVENOUS; SUBCUTANEOUS at 14:36

## 2018-10-11 RX ADMIN — Medication 125 MILLIGRAM(S): at 03:21

## 2018-10-11 RX ADMIN — LORATADINE 10 MILLIGRAM(S): 10 TABLET ORAL at 12:18

## 2018-10-11 RX ADMIN — Medication 81 MILLIGRAM(S): at 12:18

## 2018-10-11 RX ADMIN — Medication 8 MILLIGRAM(S): at 21:30

## 2018-10-11 RX ADMIN — MEROPENEM 100 MILLIGRAM(S): 1 INJECTION INTRAVENOUS at 14:33

## 2018-10-11 RX ADMIN — Medication 125 MILLIGRAM(S): at 08:34

## 2018-10-11 RX ADMIN — FINASTERIDE 5 MILLIGRAM(S): 5 TABLET, FILM COATED ORAL at 12:18

## 2018-10-11 RX ADMIN — Medication 125 MILLIGRAM(S): at 17:19

## 2018-10-11 RX ADMIN — Medication 100 MILLIGRAM(S): at 12:18

## 2018-10-11 RX ADMIN — PANTOPRAZOLE SODIUM 40 MILLIGRAM(S): 20 TABLET, DELAYED RELEASE ORAL at 05:19

## 2018-10-11 RX ADMIN — Medication 125 MILLIGRAM(S): at 23:43

## 2018-10-11 RX ADMIN — SIMVASTATIN 10 MILLIGRAM(S): 20 TABLET, FILM COATED ORAL at 21:30

## 2018-10-11 RX ADMIN — HEPARIN SODIUM 5000 UNIT(S): 5000 INJECTION INTRAVENOUS; SUBCUTANEOUS at 21:30

## 2018-10-11 RX ADMIN — Medication 0.5 MILLIGRAM(S): at 19:58

## 2018-10-11 NOTE — PROGRESS NOTE ADULT - ASSESSMENT
84 y/o M PMHx significant for CAD s/p stents, AFib, diastolic CHF, hx of upper GI bleeding, PVD, hypertension, hypertension, severe COPD (O2 dependent), SHAYY on bipap at night, ESRD on HD (MWF), recurrent c. diff who was BIBA from dialysis ([facility]) where he was found to have a post-dialysis fever associated w/ tachycardia. In triage => /min, Tmax 102.7'F. Labs => LA 1.2, BUN/Cr 27/1.59, Alb 2.7, Phos 4.2. UA (+), RVP (-). CT ABD/Pelvis => Trace pericolonic stranding at the level of proximal to mid descending colon. Clinical correlation is advised to assess for mild colitis.  Diffuse bladder wall thickening with trace perivesicular stranding. in the ED the patient was given Csaljhsk3j IVPB x 1, Qfdyiywuzi3i IVPB x 1, NS 1.5L x 1.     1. febrile syndrome/cystitis/hx of CDAD/ESRD  - improving   - s/p cefepime #2  - urine cx growing KLPN ESBL >100,000  - on meropenem 500mg daily #4/5  - plan for 5 days  - on oral vancomycin [id number] for c diff prophylaxis while remains on abx #6  - monitor temps  - tolerating abx well so far; no side effects noted  - reason for abx use and side effects reviewed with patient  - supportive care  - f/u cbc

## 2018-10-11 NOTE — PHYSICAL THERAPY INITIAL EVALUATION ADULT - MANUAL MUSCLE TESTING RESULTS, REHAB EVAL
Bilateral UE strength 3+/5 throughout grossly. Right LE 3/5. Left LE not fully assesses secondary to pain. DF 3/5. All MMT performed within available ROM and in supine position.

## 2018-10-11 NOTE — PHYSICAL THERAPY INITIAL EVALUATION ADULT - GENERAL OBSERVATIONS, REHAB EVAL
Pt found supine in bed with dressing/gauze left LE from mid thigh to left foot and bed alarm activated. Pt c/o pain left LE 8/10 with movement.

## 2018-10-11 NOTE — PROGRESS NOTE ADULT - ASSESSMENT
Atrial fibrillation- paroxysmal-   Pt remains clincally stable without any obivous gi bleed at this time on coumadin.  His CHADvasc score is atleast 4 and he is at high risk for CVA.  If the anticoagulation is contemplated I would recommend coumadin with goal INR 2-2.5 to start with and closely monitor for GI bleed.  He will be ideal candidate for Watchmann device and the evaluation to be done as outpt after discharge.    HTN -continue current meds.  BP optimal.    Peripheral vascular disease- continue statin.    Other medical issues- Management per primary team.   Thank you for allowing me to participate in the care of this patient. Please feel free to contact me with any questions.

## 2018-10-11 NOTE — PHYSICAL THERAPY INITIAL EVALUATION ADULT - ORIENTATION, REHAB EVAL
Acute Care - Occupational Therapy Treatment Note  [facility]     Patient Name: Ravi Park  : 1930  MRN: [id number]  Today's Date: 2018  Onset of Illness/Injury or Date of Surgery: 18  Date of Referral to OT: 18  Referring Physician: JUSTIN Russo    Admit Date: 2018       ICD-10-CM ICD-9-CM   1. Urinary tract infection associated with indwelling urethral catheter, initial encounter T83.511A 996.64    N39.0 599.0   2. Hypoxia R09.02 799.02   3. Chronic atrial fibrillation I48.2 427.31   4. Impaired functional mobility and endurance Z74.09 V49.89   5. Nephrolithiasis N20.0 592.0   6. Impaired mobility and activities of daily living Z74.09 799.89     Patient Active Problem List   Diagnosis   • Peripheral arterial disease   • Chronic atrial fibrillation   • Backache   • Lumbar radiculopathy   • Edema   • Acute congestive heart failure   • Acute blood loss anemia   • Acute on chronic diastolic heart failure   • YESENIA (acute kidney injury)   • (HFpEF) heart failure with preserved ejection fraction   • Urinary retention   • Personal history of tobacco use, presenting hazards to health   • Vitreous floaters   • Upper respiratory infection   • Sprain of shoulder and upper arm   • Shoulder pain   • Serous otitis media   • Sensorineural hearing loss   • Pure hypercholesterolemia   • Pseudophakia   • Prostatitis   • Pain in wrist   • Pain in thumb joint with movement   • Pain in joint involving right lower leg   • Pain in eye   • Otorrhea   • Open wound of toe   • Open wound of lower leg with complication   • Open wound of lower leg   • Open wound of lesser toe of left foot without damage to nail   • Olecranon bursitis   • Need for prophylactic vaccination and inoculation against influenza   • Need for immunization against influenza   • Multiple joint pain   • Malaise and fatigue   • Keratoconjunctivitis sicca   • Insect bite   • Impacted cerumen   • Hypertensive disorder   • History of  colonoscopy   • History of colon polyps   • History of artificial eye lens   • Herpes zoster   • Gout   • Exposure to communicable disease   • Dog bite of hand   • Disorder of sacroiliac joint   • Decreased platelet count   • Cough   • Contusion of thumb   • Common cold   • Cervical arthritis   • Cerebrovascular accident   • Cellulitis of lower leg   • Cataract   • Benign prostatic hyperplasia   • Atrial fibrillation   • Astigmatism   • Allergic rhinitis due to pollen   • Allergic rhinitis   • Adverse reaction to drug   • Acute confusion   • Acute bronchitis   • Abnormal gait   • Urinary tract infection associated with indwelling urethral catheter   • Hydronephrosis   • Nephrolithiasis     Past Medical History:   Diagnosis Date   • Allergic rhinitis    • Benign prostatic hyperplasia    • Cerebrovascular accident    • Chronic anemia    • Chronic atrial fibrillation    • CKD (chronic kidney disease) stage 3, GFR 30-59 ml/min    • Elevated cholesterol    • GERD (gastroesophageal reflux disease)    • Gout    • Herpes zoster    • History of transfusion    • Hypercholesterolemia    • Hypertension    • Peripheral arterial disease    • Sensorineural hearing loss      Past Surgical History:   Procedure Laterality Date   • CARDIAC CATHETERIZATION     • COLONOSCOPY     • CYSTOSCOPY, URETEROSCOPY, RETROGRADE PYELOGRAM, STENT INSERTION Left 5/27/2018    Procedure: CYSTOSCOPY, URETEROSCOPY, RETROGRADE PYELOGRAM;  Surgeon: Stephen Serrano MD;  Location[facility];  Service: Urology   • HERNIA REPAIR         Therapy Treatment          Rehabilitation Treatment Summary     Row Name 06/07/18 1335 06/07/18 0720          Treatment Time/Intention    Discipline occupational therapy assistant  -BB occupational therapy assistant  -BB     Document Type therapy note (daily note)  -BB therapy note (daily note)  -BB     Subjective Information no complaints  -BB no complaints  -BB     Mode of Treatment individual therapy;occupational therapy   -BB individual therapy;occupational therapy  -BB     Patient/Family Observations  -- sitting EOB  -BB     Total Minutes, Occupational Therapy Treatment 25  -BB 70  -BB     Therapy Frequency (OT Eval) other (see comments)   3-14 tx's/wk  -BB other (see comments)   3-14 txs/wk  -BB     Patient Effort good  -BB good  -BB     Existing Precautions/Restrictions fall  -BB fall  -BB     Recorded by [BB] Carla Lutz VALENCIA/L 06/07/18 1456 [BB] Carla Lutz VALENCIA/L 06/07/18 1019     Row Name 06/07/18 1335 06/07/18 0720          Vital Signs    Pre Systolic BP Rehab  -- 120  -BB     Pre Treatment Diastolic BP  -- 72  -BB     Pretreatment Heart Rate (beats/min) 86  -BB 83  -BB     Posttreatment Heart Rate (beats/min) 80  -BB 86  -BB     Pre SpO2 (%) 94  -BB 96  -BB     O2 Delivery Pre Treatment room air  -BB supplemental O2  -BB     Post SpO2 (%) 93  -BB 94  -BB     O2 Delivery Post Treatment room air  -BB supplemental O2  -BB     Pre Patient Position Supine  -BB Sitting  -BB     Post Patient Position Sitting  -BB Sitting  -BB     Recorded by [BB] Carla Lutz VALENCIA/L 06/07/18 1456 [BB] Carla Lutz VALENCIA/L 06/07/18 1019     Row Name 06/07/18 1335 06/07/18 0720          Cognitive Assessment/Intervention- PT/OT    Affect/Mental Status (Cognitive)  -- WFL  -BB     Orientation Status (Cognition) oriented x 4  -BB oriented x 4  -BB     Follows Commands (Cognition) WNL  -BB WNL  -BB     Cognitive Function (Cognitive) WNL  -BB WNL  -BB     Safety Deficit (Cognitive) mild deficit  -BB mild deficit  -BB     Personal Safety Interventions fall prevention program maintained;gait belt;nonskid shoes/slippers when out of bed;supervised activity  -BB fall prevention program maintained;gait belt;nonskid shoes/slippers when out of bed;muscle strengthening facilitated;supervised activity  -BB     Recorded by [BB] Carla Lutz VALENCIA/L 06/07/18 1456 [BB] Carla Lutz VALENCIA/L 06/07/18 1019     Row Name 06/07/18  1335             Bed Mobility Assessment/Treatment    Supine-Sit [place] (Bed Mobility) contact guard  -BB      Bed Mobility, Safety Issues decreased use of arms for pushing/pulling;decreased use of legs for bridging/pushing  -BB      Assistive Device (Bed Mobility) bed rails;head of bed elevated  -BB      Recorded by [BB] Carla Lutz VALENCIA/L 06/07/18 1456      Row Name 06/07/18 0720             Transfer Assessment/Treatment    Sit-Stand [place] (Transfers) minimum assist (75% patient effort)  -BB      Stand-Sit [place] (Transfers) contact guard  -BB      Recorded by [BB] Carla Lutz VALENCIA/L 06/07/18 1019      Row Name 06/07/18 0720             Sit-Stand Transfer    Assistive Device (Sit-Stand Transfers) walker, front-wheeled  -BB      Recorded by [BB] Carla Lutz VALENCIA/L 06/07/18 1019      Row Name 06/07/18 0720             Stand-Sit Transfer    Assistive Device (Stand-Sit Transfers) walker, front-wheeled  -BB      Recorded by [BB] Carla Lutz VALENCIA/L 06/07/18 1019      Row Name 06/07/18 0720             ADL Assessment/Intervention    Additional Documentation --   pt states he is too cold for a bath at this time  -BB      Recorded by [BB] Carla Lutz VALENCIA/L 06/07/18 1019      Row Name 06/07/18 1335 06/07/18 0720          Upper Body Dressing Assessment/Training    Upper Body Dressing [place] Level doff;don;contact guard assist   hospital gown  -BB don;contact guard assist   gown as robe  -BB     Upper Body Dressing Position edge of bed sitting  -BB edge of bed sitting  -BB     Comment (Upper Body Dressing) pt c/o hospital gown being sticky  -BB  --     Recorded by [BB] Carla Lutz VALENCIA/L 06/07/18 1456 [BB] Carla Lutz VALENCIA/L 06/07/18 1019     Row Name 06/07/18 1335 06/07/18 0720          Lower Body Dressing Assessment/Training    Lower Body Dressing [place] Level doff;socks;minimum assist (75% patient effort)  -BB doff;don;socks;minimum  assist (75% patient effort)  -BB     Lower Body Dressing Position long sitting  -BB edge of bed sitting  -BB     Recorded by [BB] ERIK More/NICOLE 06/07/18 1456 [BB] CASSIE MoreA/L 06/07/18 1019     Row Name 06/07/18 1335 06/07/18 0720          Grooming Assessment/Training    [place] Level (Grooming) wash face, hands;set up;conditional independence  -BB hair care, combing/brushing;oral care regimen;wash face, hands;set up;supervision  -BB     Grooming Position edge of bed sitting  -BB edge of bed sitting  -BB     Recorded by [BB] ERIK More/NICOLE 06/07/18 1456 [BB] ERIK More/L 06/07/18 1019     Row Name 06/07/18 0720             Toileting Assessment/Training    [place] Level (Toileting) toileting skills;minimum assist (75% patient effort)  -BB      Toileting Position supported sitting  -BB      Recorded by [BB] ERIK More/L 06/07/18 1019      Row Name 06/07/18 0720             Upper Extremity Seated Therapeutic Exercise    Performed, Seated Upper Extremity (Therapeutic Exercise) --   across chest, diagonals, pull ups, punch outs  -BB      Device, Seated Upper Extremity (Therapeutic Exercise) elastic bands/tubing  -BB      Exercise Type, Seated Upper Extremity (Therapeutic Exercise) AROM (active range of motion)  -BB      Expected Outcomes, Seated Upper Extremity (Therapeutic Exercise) improve functional tolerance, self-care activity;improve functional tolerance, household activity  -BB      Sets/Reps Detail, Seated Upper Extremity (Therapeutic Exercise) 1 set x15 reps  -BB      Comment, Seated Upper Extremity (Therapeutic Exercise) RBs as needed  -BB      Recorded by [BB] ERIK More/L 06/07/18 1019      Row Name 06/07/18 1335 06/07/18 0720          Therapeutic Exercise    Upper Extremity Range of Motion (Therapeutic Exercise) shoulder flexion/extension, bilateral   shoulder shrugs, shoulder ROM AAROM 2' to L UE limited ROM  -BB  shoulder internal/external rotation, bilateral;elbow flexion/extension, bilateral;forearm supination/pronation, bilateral;wrist flexion/extension, bilateral;shoulder flexion/extension, right  -BB     Hand (Therapeutic Exercise)  -- finger flexion/extension, bilateral  -BB     Exercise Type (Therapeutic Exercise)  -- AROM (active range of motion)  -BB     Position (Therapeutic Exercise) seated  -BB seated  -BB     Sets/Reps (Therapeutic Exercise) 1 x10 reps  -BB 2 setsx20  -BB     Expected Outcome (Therapeutic Exercise) improve functional tolerance, self-care activity;improve functional tolerance, household activity  -BB2  --     Recorded by [BB] CASSIE MoreA/L 06/07/18 1456  [BB2] CASSIE MoreA/NICOLE 06/07/18 1459 [BB] CASSIE MoreA/L 06/07/18 1019     Row Name 06/07/18 1335             Static Sitting Balance    Level of [place] (Unsupported Sitting, Static Balance) independent  -BB      Sitting Position (Unsupported Sitting, Static Balance) sitting on edge of bed  -BB      Time Able to Maintain Position (Unsupported Sitting, Static Balance) more than 5 minutes  -BB      Recorded by [BB] CASSIE MoreA/L 06/07/18 1456      Row Name 06/07/18 1335 06/07/18 0720          Positioning and Restraints    Pre-Treatment Position in bed  -BB in bed  -BB     Post Treatment Position bed  -BB bed  -BB     In Bed sitting EOB;call light within reach;encouraged to call for assist;exit alarm on  -BB sitting EOB;encouraged to call for assist;exit alarm on;call light within reach  -BB     Recorded by [BB] CASSIE MoreA/NICOLE 06/07/18 1456 [BB] CASSIE MoreA/L 06/07/18 1019     Row Name 06/07/18 1335             Pain Assessment    Additional Documentation Pain Scale: Numbers Pre/Post-Treatment (Group)  -BB      Recorded by [BB] ERIK More/L 06/07/18 1456      Row Name 06/07/18 1335 06/07/18 0720          Pain Scale: Numbers Pre/Post-Treatment    Pain Scale:  Numbers, Pretreatment 6/10  -BB 6/10  -BB     Pain Scale: Numbers, Post-Treatment 7/10  -BB 5/10  -BB     Pain Location - Side  -- Left  -BB     Pain Location back  -BB ankle  -BB     Pain Intervention(s)  -- --   nsg notified  -BB     Recorded by [BB] CASSIE MoreA/L 06/07/18 1456 [BB] Carla Lutz VALENCIA/L 06/07/18 1019     Row Name                Wound 04/24/18 1440 Left lower leg    Wound - Properties Group Date first assessed: 04/24/18 [DL] Time first assessed: 1440 [DL2] Present On Admission : picture taken [DL] Side: Left [DL] Orientation: lower [DL] Location: leg [DL] Recorded by:  [DL] Soco Montes RN 04/24/18 1813 [DL2] Soco Montes RN 04/24/18 1814    Row Name                Wound 06/01/18 0800 Right lower leg ulceration, venous    Wound - Properties Group Date first assessed: 06/01/18 [CC] Time first assessed: 0800 [CC] Present On Admission : yes [CC] Side: Right [CC] Orientation: lower [CC] Location: leg [CC] Type: ulceration, venous [CC] Recorded by:  [CC] Jeanne Coley RN 06/03/18 1829    Row Name 06/07/18 1335 06/07/18 0720          Plan of Care Review    Plan of Care Reviewed With patient  -BB patient  -BB     Recorded by [BB] Carla Lutz VALENCIA/L 06/07/18 1456 [BB] Carla Lutz VALENCIA/L 06/07/18 1019     Row Name 06/07/18 1335 06/07/18 0720          Outcome Summary/Treatment Plan (OT)    Daily Summary of Progress (OT) progress toward functional goals is good  -BB progress toward functional goals is good  -BB     Plan for Continued Treatment (OT) continue POC  -BB continue POC  -BB     Anticipated Discharge Disposition (OT) long term acute care facility  -BB home with assist;home with home health  -BB     Recorded by [BB] Carla Lutz VALENCIA/L 06/07/18 1456 [BB] Carla Lutz VALENCIA/L 06/07/18 1019       User Key  (r) = Recorded By, (t) = Taken By, (c) = Cosigned By    Initials Name Effective Dates Discipline    DL Soco Montes RN 10/17/16 -   Nurse    BB Carla Lutz, VALENCIA/L 03/07/18 -  OT    CC Jeanne Coley RN 10/17/16 -  Nurse        Wound 04/24/18 1440 Left lower leg (Active)   Dressing Appearance open to air 6/7/2018  7:06 AM   Base closed/resurfaced 6/7/2018  7:06 AM   Drainage Amount none 6/7/2018  7:06 AM   Care, Wound barrier applied 6/7/2018  7:06 AM   Dressing Care, Wound open to air 6/7/2018  7:06 AM       Wound 06/01/18 0800 Right lower leg ulceration, venous (Active)   Dressing Appearance open to air 6/7/2018  7:06 AM   Base closed/resurfaced 6/7/2018  7:06 AM   Drainage Amount none 6/7/2018  7:06 AM   Care, Wound barrier applied 6/7/2018  7:06 AM   Dressing Care, Wound open to air 6/7/2018  7:06 AM     Non-skid socks and gait belt in place. Toileting offered. Call light and needs within reach. Pt advised to not get up alone and call the nurse for assistance.  Bed alarm on.           OT Rehab Goals     Row Name 06/07/18 1335 06/07/18 0720          Bathing Goal 1 (OT)    Activity/Assistive Device (Bathing Goal 1, OT) bathing skills, all   AE PRN  -BB bathing skills, all   AE PRN  -BB     [place] Level/Cues Needed (Bathing Goal 1, OT) supervision required  -BB supervision required  -BB     Time Frame (Bathing Goal 1, OT) long term goal (LTG);by discharge  -BB long term goal (LTG);by discharge  -BB     Progress/Outcomes (Bathing Goal 1, OT) goal not met;continuing progress toward goal  -BB goal not met;continuing progress toward goal  -BB        Dressing Goal 1 (OT)    Activity/Assistive Device (Dressing Goal 1, OT) dressing skills, all   AE PRN  -BB dressing skills, all   AE PRN  -BB     [place]/Cues Needed (Dressing Goal 1, OT) supervision required  -BB supervision required  -BB     Time Frame (Dressing Goal 1, OT) long term goal (LTG);by discharge  -BB long term goal (LTG);by discharge  -BB     Progress/Outcome (Dressing Goal 1, OT) goal not met;continuing progress toward goal  -BB goal not met;continuing progress toward  goal  -BB        Patient Education Goal (OT)    Activity (Patient Education Goal, OT) AE management, home safety, BUE HEP, EC/WS  -BB AE management, home safety, BUE HEP, EC/WS  -BB     [place]/Cues/Accuracy (Memory Goal 2, OT) demonstrates adequately;independent;verbalizes understanding  -BB demonstrates adequately;independent;verbalizes understanding  -BB     Time Frame (Patient Education Goal, OT) long term goal (LTG);by discharge  -BB long term goal (LTG);by discharge  -BB     Progress/Outcome (Patient Education Goal, OT) goal not met;continuing progress toward goal  -BB goal not met;continuing progress toward goal  -BB       User Key  (r) = Recorded By, (t) = Taken By, (c) = Cosigned By    Initials Name Provider Type Discipline    ERIK Durham/L Occupational Therapy Assistant OT        Occupational Therapy Education     Title: PT OT SLP Therapies (Active)     Topic: Occupational Therapy (Active)     Point: ADL training (Active)     Description: Instruct learner(s) on proper safety adaptation and remediation techniques during self care or transfers.   Instruct in proper use of assistive devices.   Learning Progress Summary     Learner Status Readiness Method Response Comment Documented by    Patient Active Acceptance E NR  BB 06/07/18 1021     Done Acceptance E,TB VU  LW 06/06/18 1414     Done Acceptance E,TB,H VU Educated pt on HEP and Home safety LW 05/31/18 1321     Done Acceptance E VU Educated on role of OT and POC. Educated on benefit of activity and safety with t/f and functional mobility in room. Educated to call for help and not to get OOB on his own. MR 05/28/18 1142          Point: Home exercise program (Active)     Description: Instruct learner(s) on appropriate technique for monitoring, assisting and/or progressing therapeutic exercises/activities.   Learning Progress Summary     Learner Status Readiness Method Response Comment Documented by    Patient Active Acceptance E NR  BB  06/07/18 1021     Done Acceptance E,TB VU  LW 06/06/18 1414     Done Acceptance E,TB,H VU Educated pt on HEP and Home safety  05/31/18 1321          Point: Precautions (Done)     Description: Instruct learner(s) on prescribed precautions during self-care and functional transfers.   Learning Progress Summary     Learner Status Readiness Method Response Comment Documented by    Patient Done Acceptance E,TB VU  LW 06/06/18 1414     Done Acceptance E,TB,H VU Educated pt on HEP and Home safety  05/31/18 1321     Active Acceptance E NR   05/30/18 1435     Active Acceptance E NR   05/29/18 0927     Done Acceptance E VU Educated on role of OT and POC. Educated on benefit of activity and safety with t/f and functional mobility in room. Educated to call for help and not to get OOB on his own. MR 05/28/18 1142          Point: Body mechanics (Active)     Description: Instruct learner(s) on proper positioning and spine alignment during self-care, functional mobility activities and/or exercises.   Learning Progress Summary     Learner Status Readiness Method Response Comment Documented by    Patient Active Acceptance E NR   06/07/18 1021     Done Acceptance E,TB VU   06/06/18 1414     Active Acceptance E NR   06/01/18 1130     Done Acceptance E,TB,H VU Educated pt on HEP and Home safety  05/31/18 1321     Active Acceptance E NR   05/30/18 1435     Active Acceptance E NR   05/29/18 0927     Done Acceptance E VU Educated on role of OT and POC. Educated on benefit of activity and safety with t/f and functional mobility in room. Educated to call for help and not to get OOB on his own. MR 05/28/18 1142                      User Key     Initials Effective Dates Name Provider Type Discipline     03/07/18 -  ERIK More/L Occupational Therapy Assistant OT     03/07/18 -  ERIK Forbes/L Occupational Therapy Assistant OT     03/07/18 -  ERIK Evans/L Occupational Therapy Assistant  person/place OT    MR 04/03/18 -  Katie Silvestre, OT Occupational Therapist OT                OT Recommendation and Plan  Outcome Summary/Treatment Plan (OT)  Daily Summary of Progress (OT): progress toward functional goals is good  Plan for Continued Treatment (OT): continue POC  Anticipated Discharge Disposition (OT): long term acute care facility  Therapy Frequency (OT Eval): other (see comments) (3-14 tx's/wk)  Daily Summary of Progress (OT): progress toward functional goals is good  Plan of Care Review  Plan of Care Reviewed With: patient  Plan of Care Reviewed With: patient  Outcome Summary: Pt sitting EOB during B UE exercises. Pt's O2 remained >90% during activity. Pt t/f bed to toilet with min A. No goals met this am.        Outcome Measures     Row Name 06/07/18 0720 06/06/18 1010          How much help from another is currently needed...    Putting on and taking off regular lower body clothing? 2  -BB 2  -LW     Bathing (including washing, rinsing, and drying) 2  -BB 2  -LW     Toileting (which includes using toilet bed pan or urinal) 2  -BB 2  -LW     Putting on and taking off regular upper body clothing 3  -BB 3  -LW     Taking care of personal grooming (such as brushing teeth) 3  -BB 3  -LW     Eating meals 4  -BB 4  -LW     Score 16  -BB 16  -LW       User Key  (r) = Recorded By, (t) = Taken By, (c) = Cosigned By    Initials Name Provider Type    BB ERIK More/L Occupational Therapy Assistant    ERIK Purvis/L Occupational Therapy Assistant           Time Calculation:         Time Calculation- OT     Row Name 06/07/18 1458 06/07/18 1023          Time Calculation- OT    OT Start Time 1335  -BB 0720  -BB     OT Stop Time 1400  -BB 0830  -BB     OT Time Calculation (min) 25 min  -BB 70 min  -BB     Total Timed Code Minutes- OT 25 minute(s)  -BB 70 minute(s)  -BB     OT Received On 06/07/18  -BB 06/07/18  -BB       User Key  (r) = Recorded By, (t) = Taken By, (c) = Cosigned By    Initials  Name Provider Type    BB ERIK More/L Occupational Therapy Assistant           Therapy Charges for Today     Code Description Service Date Service Provider Modifiers Qty    25408967634 HC OT SELF CARE/MGMT/TRAIN EA 15 MIN 6/7/2018 ERIK More/L GO 3    89665260396 HC OT THERAPEUTIC ACT EA 15 MIN 6/7/2018 CASSIE MoreA/L GO 2    99214713234 HC OT SELF CARE/MGMT/TRAIN EA 15 MIN 6/7/2018 CASSIE MoreA/L GO 1    31832477093 HC OT THERAPEUTIC ACT EA 15 MIN 6/7/2018 CASSIE MoreA/L GO 1          OT G-codes  OT Professional Judgement Used?: Yes  OT Functional Scales Options: AM-PAC 6 Clicks Daily Activity (OT)  Score: 16  Functional Limitation: Self care  Self Care Current Status (): At least 40 percent but less than 60 percent impaired, limited or restricted  Self Care Goal Status (): At least 20 percent but less than 40 percent impaired, limited or restricted    ERIK More/NICOLE  6/7/2018

## 2018-10-11 NOTE — PHYSICAL THERAPY INITIAL EVALUATION ADULT - ADDITIONAL COMMENTS
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 Pt utilizes sliding board for transfers OOB to w/c. Has never used prosthetic. Info obtained from eval 7/9/2018.

## 2018-10-11 NOTE — CONSULT NOTE ADULT - REASON FOR ADMISSION
Fever and palpitations

## 2018-10-11 NOTE — PROGRESS NOTE ADULT - SUBJECTIVE AND OBJECTIVE BOX
CC: Fever and palpitations     HPI:     82 y/o M PMHx significant for CAD s/p stents, AFib, diastolic CHF, hx of upper GI bleeding, PVD, hypertension, hypertension, severe COPD (O2 dependent), SHAYY on bipap at night, ESRD on HD (MWF), recurrent c. diff who was BIBA on 10/5/18  from dialysis ([facility]) where he was found to have a post-dialysis fever associated w/ tachycardia. In triage => /min, Tmax 102.7'F. Labs => LA 1.2, BUN/Cr 27/1.59, Alb 2.7, Phos 4.2. UA (+), RVP (-). CT ABD/Pelvis => Trace pericolonic stranding at the level of proximal to mid descending colon. Clinical correlation is advised to assess for mild colitis. No significant wall thickening. Moderate fecal burden. No evidence of small bowel obstruction or extraluminal collection. Re-demonstrated incompletely characterize right hepatic lobe hypodensity during 3 x 1.6 cm for which nonemergent liver protocol MR is advised provided no contraindications. Diffuse bladder wall thickening with trace perivesicular stranding. Correlate with urinalysis to assess for cystitis in appropriate clinical setting. Consider nonemergent retrograde evaluation is concern for bladder malignancy. Uncomplicated cholelithiasis. In the ED the patient was given [id number] IVPB x 1, [id number] IVPB x 1, NS 1.5L x 1.    INTERVAL HPI/ OVERNIGHT EVENTS:    10/7/18 Pt was seen and examined,  reports feeling well no complains. No fevers, no CP or SOB   10/8 /18 pt seen and examined, denies fever, chills, abdominal pain, tolerating PO diet, denies new sx  10/9/18 - pt seen and examined at HD, denies CP, dyspnea, weakness, afebrile  10/10/18 - pt seen and examined, denies fever, chills, + nasal congestion, denies new sx  18 - pt seen and examined , reports left leg pain, dressing removed feels better , denies fever, chills  REVIEW OF SYSTEMS:  All other review of systems is negative unless indicated above.    T(C): 37.2 (10-11-18 @ 17:11), Max: 37.2 (10-11-18 @ 17:11)  T(F): 99 (10-11-18 @ 17:11), Max: 99 (10-11-18 @ 17:11)  HR: 74 (10-11-18 @ 17:11) (66 - 80)  BP: 140/38 (10-11-18 @ 17:11) (131/46 - 140/38)  RR: 19 (10-11-18 @ 17:11) (19 - 20)  SpO2: 98% (10-11-18 @ 17:11) (95% - 98%)  Wt(kg): --  PHYSICAL EXAM:  General: Well developed; well nourished; in no acute distress  Eyes: PERRLA, EOMI; conjunctiva and sclera clear  Head: Normocephalic; atraumatic  ENMT: No nasal discharge; airway clear  Neck: Supple; non tender; no masses  Respiratory: Good air entry  No wheezes, rales or rhonchi  Cardiovascular: Regular rate and rhythm. S1 and S2 Normal; No murmurs  Gastrointestinal: Soft non-tender non-distended; Normal bowel sounds  Genitourinary: No costovertebral angle tenderness  Extremities: No edema, S/p R AKA  Vascular: Peripheral pulses diminished, LLE  Neurological: Alert and oriented x4  Skin: Warm and dry. No acute rash.  LLE with dressing and in a boot   Lymph Nodes: No acute cervical adenopathy  Musculoskeletal: Normal muscle tone, without deformities  Psychiatric: Cooperative and appropriate        LABS:  10-11    140  |  106  |  41<H>  ----------------------------<  79  3.7   |  22  |  2.64<H>    Ca    7.7<L>      11 Oct 2018 07:04                         9.2    4.57  )-----------( 146      ( 11 Oct 2018 07:04 )             29.5     PT/INR - ( 11 Oct 2018 07:04 )   PT: 11.7 sec;   INR: 1.08 ratio        10-09    149<H>  |  114<H>  |  56<H>  ----------------------------<  110<H>  3.5   |  21<L>  |  2.59<H>    Ca    7.7<L>      09 Oct 2018 08:25  Phos  4.6     10-09    TPro  x   /  Alb  1.6<L>  /  TBili  x   /  DBili  x   /  AST  x   /  ALT  x   /  AlkPhos  x   10-09                      8.9    3.58  )-----------( 141      ( 09 Oct 2018 08:25 )             28.8     LIVER FUNCTIONS - ( 09 Oct 2018 08:25 )  Alb: 1.6 g/dL / Pro: x     / ALK PHOS: x     / ALT: x     / AST: x     / GGT: x           10-08    143  |  110<H>  |  58<H>  ----------------------------<  127<H>  3.9   |  23  |  2.84<H>    Ca    8.5      08 Oct 2018 11:25                          9.7    3.85  )-----------( 148      ( 08 Oct 2018 11:25 )             31.8                         9.9    4.93  )-----------( 142      ( 07 Oct 2018 11:18 )             32.4     10-07    144  |  112<H>  |  52<H>  ----------------------------<  131<H>  3.7   |  23  |  2.87<H>    Ca    8.5      07 Oct 2018 11:18  Phos  4.5     10-06  Mg     1.7     10-    TPro  5.8<L>  /  Alb  2.4<L>  /  TBili  0.4  /  DBili  x   /  AST  24  /  ALT  31  /  AlkPhos  63  10-06                       10.4   4.83  )-----------( 143      ( 05 Oct 2018 20:40 )             33.1     05 Oct 2018 20:40    139    |  106    |  27     ----------------------------<  105    3.5     |  23     |  1.59     Ca    8.2        05 Oct 2018 20:40    TPro  6.5    /  Alb  2.7    /  TBili  0.3    /  DBili  x      /  AST  28     /  ALT  36     /  AlkPhos  74     05 Oct 2018 20:40    PT/INR - ( 05 Oct 2018 20:40 )   PT: 12.3 sec;   INR: 1.14 ratio         PTT - ( 05 Oct 2018 20:40 )  PTT:59.5 sec  CAPILLARY BLOOD GLUCOSE        LIVER FUNCTIONS - ( 05 Oct 2018 20:40 )  Alb: 2.7 g/dL / Pro: 6.5 gm/dL / ALK PHOS: 74 U/L / ALT: 36 U/L / AST: 28 U/L / GGT: x           Urinalysis Basic - ( 06 Oct 2018 01:57 )    Color: Yellow / Appearance: very cloudy / S.015 / pH: x  Gluc: x / Ketone: Trace  / Bili: Negative / Urobili: Negative mg/dL   Blood: x / Protein: 500 mg/dL / Nitrite: Negative   Leuk Esterase: Moderate / RBC: 0-2 /HPF / WBC TNTC /HPF   Sq Epi: x / Non Sq Epi: Negative / Bacteria: Few    Culture - Urine (10.06.18 @ 01:57)    Specimen Source: .Urine None    Culture Results:   >100,000 CFU/ml Klebsiella pneumoniae      Culture - Urine (10.06.18 @ 01:57)    -  Amikacin: S <=8    -  Amoxicillin/Clavulanic Acid: S <=8/4    -  Ampicillin: R >16 These ampicillin results predict results for amoxicillin    -  Ampicillin/Sulbactam: R >16/8    -  Aztreonam: R >16    -  Cefazolin: R >16 For uncomplicated UTI with K. pneumoniae, E. coli, or P. mirablis: DAVIDSON <=16 is sensitive and DAVIDSON >=32 is resistant. This also predicts results for oral agents cefaclor, cefdinir, cefpodoxime, cefprozil, cefuroxime axetil, cephalexin and locarbef for uncomplicated UTI. Note that some isolates may be susceptible to these agents while testing resistant to cefazolin.    -  Cefepime: R >16    -  Cefoxitin: S <=4    -  Ceftriaxone: R >32 Enterobacter, Citrobacter, and Serratia may develop resistance during prolonged therapy    -  Ciprofloxacin: S 1    -  Ertapenem: S <=0.5    -  Gentamicin: R >8    -  Imipenem: S <=1    -  Levofloxacin: S <=1    -  Meropenem: S <=1    -  Nitrofurantoin: R >64 Should not be used to treat pyelonephritis    -  Piperacillin/Tazobactam: R <=8    -  Tigecycline: S <=1    -  Tobramycin: I 8    -  Trimethoprim/Sulfamethoxazole: R >    Specimen Source: .Urine None    Culture Results:   >100,000 CFU/ml Klebsiella pneumoniae ESBL  Multiple Morphological Strains    Organism Identification: Klebsiella pneumoniae ESBL    Organism: Klebsiella pneumoniae ESBL    Method Type: DAVIDSON        RADIOLOGY & ADDITIONAL TESTS:    EXAM:  CT ABDOMEN AND PELVIS                        PROCEDURE DATE:  10/05/2018      FINDINGS:    Absence of intravenous contrast limits evaluation for focal lesions,   neoplasm, and vascular pathology.    Lower Thorax: No consolidation or effusion. Incompletely characterized   oblong right lower lobe opacity which nonemergent CT chest follow-up is   advised for better characterization.    Liver: Redemonstrated incompletely characterize right hepatic lobe   hypodensity during 3 x 1.6 cm on image 19 of series 2 for which   nonemergent liver protocol MR is advised provided no contraindications.  Biliary: No dilatation. Uncomplicated cholelithiasis.  Spleen: No suspicious lesions.      Pancreas: No inflammatory changes or ductal dilatation.      Adrenals: Normal.      Kidneys: No hydronephrosis. Stable scattered renal hypodense and   hyperdense lesions.  Vessels: Normal caliber. Atherosclerotic disease of the aorta and its   branches with associated diffuse dense vascular calcifications.   Infrarenal IVC filter noted. Vascular surgical clips are seen in the   right groin region. Right external iliac artery vascular stent identified.    GI tract: Gastric clips again noted at the fundus. Trace pericolonic   stranding at the level of proximal to mid descending colon. Clinical   correlation is advised to assess for mild colitis.No significant wall   thickening. Moderate fecal burden. No evidence of small bowel   obstruction. Normal-appearing appendix.    Peritoneum/retroperitoneum and mesentery: No free air. No organized fluid   collection. No adenopathy.        Pelvic organs/Bladder: Diffuse bladder wall thickening with trace   perivesicular stranding. Correlate with urinalysis to assess for cystitis   in appropriate clinical setting. Consider nonemergent retrograde   evaluation is concern for bladder malignancy. Heterogenous prostate   containing calcifications.    Abdominal wall: A small umbilical hernia containing portion of   obstructive bowelloop noted.  Bones and soft tissues: Multilevel degenerative changes of the spine   noted stable mild compression deformity of T12 vertebral body.   Degenerative changes of bilateral hip joints with nonspecific lucencies   in the right femoral head. Recommend clinical correlation to assess for   avascular necrosis.    IMPRESSION:    Trace pericolonic stranding at the level of proximal to mid descending   colon. Clinical correlation is advised to assess for mild colitis.No   significant wall thickening. Moderate fecal burden. No evidence of small   bowel obstruction or extraluminal collection.     Redemonstrated incompletely characterize right hepatic lobe hypodensity   during 3 x 1.6 cm for which nonemergent liver protocol MR is advised   provided no contraindications.    Diffuse bladder wall thickening with trace perivesicular stranding.   Correlate with urinalysis to assess for cystitis in appropriate clinical   setting. Consider nonemergent retrograde evaluation is concern for   bladder malignancy.    Uncomplicated cholelithiasis.

## 2018-10-11 NOTE — PROGRESS NOTE ADULT - SUBJECTIVE AND OBJECTIVE BOX
Date of service: 10-11-18 @ 11:04    pt seen and examined  afebrile  no o/n events  feeling well       ROS: no fever or chills; denies dizziness, no HA, no SOB or cough, no abdominal pain, no diarrhea or constipation;  no legs pain, no rashes      MEDICATIONS  (STANDING):  allopurinol 100 milliGRAM(s) Oral daily  aspirin  chewable 81 milliGRAM(s) Oral daily  buDESOnide   0.5 milliGRAM(s) Respule 0.5 milliGRAM(s) Inhalation two times a day  diltiazem    Tablet 30 milliGRAM(s) Oral every 6 hours  doxazosin 8 milliGRAM(s) Oral at bedtime  finasteride 5 milliGRAM(s) Oral daily  heparin  Injectable 5000 Unit(s) SubCutaneous every 8 hours  loratadine 10 milliGRAM(s) Oral daily  meropenem  IVPB 500 milliGRAM(s) IV Intermittent every 24 hours  pantoprazole    Tablet 40 milliGRAM(s) Oral before breakfast  simvastatin 10 milliGRAM(s) Oral at bedtime  vancomycin    Solution 125 milliGRAM(s) Oral every 6 hours  warfarin 5 milliGRAM(s) Oral daily      Vital Signs Last 24 Hrs  T(C): 36.7 (11 Oct 2018 04:25), Max: 36.9 (10 Oct 2018 16:43)  T(F): 98 (11 Oct 2018 04:25), Max: 98.5 (10 Oct 2018 16:43)  HR: 80 (11 Oct 2018 08:04) (66 - 80)  BP: 139/35 (11 Oct 2018 04:25) (105/35 - 139/35)  BP(mean): --  RR: 19 (11 Oct 2018 04:25) (19 - 20)  SpO2: 98% (11 Oct 2018 04:25) (95% - 100%)    PE:  Constitutional: frail looking  HEENT: NC/AT, EOMI, PERRLA, conjunctivae clear; ears and nose atraumatic; pharynx benign  Neck: supple; thyroid not palpable  Back: no tenderness  Respiratory: respiratory effort normal; clear to auscultation  Cardiovascular: S1S2 regular, no murmurs  Abdomen: soft,  tender on deep palpation, not distended, positive BS; liver and spleen WNL  Genitourinary: no suprapubic tenderness  Lymphatic: no LN palpable  Musculoskeletal: no muscle tenderness, no joint swelling or tenderness  Extremities: R ext s/p BKA, L foot in brace  Neurological/ Psychiatric: moving all extremities  Skin: no rashes; no palpable lesions    Labs: all available labs reviewed                                                         9.2    57  )-----------( 146      ( 11 Oct 2018 07:04 )             29.5     10-11    140  |  106  |  41<H>  ----------------------------<  79  3.7   |  22  |  2.64<H>    Ca    7.7<L>      11 Oct 2018 07:04            Urinalysis Basic - ( 06 Oct 2018 01:57 )    Color: Yellow / Appearance: very cloudy / S.015 / pH: x  Gluc: x / Ketone: Trace  / Bili: Negative / Urobili: Negative mg/dL   Blood: x / Protein: 500 mg/dL / Nitrite: Negative   Leuk Esterase: Moderate / RBC: 0-2 /HPF / WBC TNTC /HPF   Sq Epi: x / Non Sq Epi: Negative / Bacteria: Few    Culture - Urine (10.06.18 @ 01:57)    -  Amikacin: S <=8    -  Amoxicillin/Clavulanic Acid: S <=8/4    -  Ampicillin: R >16 These ampicillin results predict results for amoxicillin    -  Ampicillin/Sulbactam: R >16/8    -  Aztreonam: R >16    -  Cefazolin: R >16 For uncomplicated UTI with K. pneumoniae, E. coli, or P. mirablis: DAVIDSON <=16 is sensitive and DAVIDSON >=32 is resistant. This also predicts results for oral agents cefaclor, cefdinir, cefpodoxime, cefprozil, cefuroxime axetil, cephalexin and locarbef for uncomplicated UTI. Note that some isolates may be susceptible to these agents while testing resistant to cefazolin.    -  Cefepime: R >16    -  Cefoxitin: S <=4    -  Ceftriaxone: R >32 Enterobacter, Citrobacter, and Serratia may develop resistance during prolonged therapy    -  Ciprofloxacin: S 1    -  Ertapenem: S <=0.5    -  Gentamicin: R >8    -  Imipenem: S <=1    -  Levofloxacin: S <=1    -  Meropenem: S <=1    -  Nitrofurantoin: R >64 Should not be used to treat pyelonephritis    -  Piperacillin/Tazobactam: R <=8    -  Tigecycline: S <=1    -  Tobramycin: I 8    -  Trimethoprim/Sulfamethoxazole: R >    Specimen Source: .Urine None    Culture Results:   >100,000 CFU/ml Klebsiella pneumoniae ESBL  Multiple Morphological Strains    Organism Identification: Klebsiella pneumoniae ESBL    Organism: Klebsiella pneumoniae ESBL    Method Type: DAVIDSON      Radiology: all available radiological tests reviewed  < from: Xray Chest 1 View-PORTABLE IMMEDIATE (10.05.18 @ 21:16) >  EXAM:  XR CHEST PORTABLE IMMED 1V                            PROCEDURE DATE:  10/05/2018          INTERPRETATION:  History: Sepsis dyspnea    Chest:  one view.      Comparison: 2018    AP radiograph of the chest demonstrates no evidence of infiltrate,   pleural effusion or vascular congestion. No atelectasis is seen. RIGHT   central venous catheter tip in SVC. The cardiac silhouette is normal in   size. Vascular calcification involves the aorta. Osseous structures are   intact.    Impression: No active pulmonary disease.        < end of copied text >    < from: CT Abdomen and Pelvis No Cont (10.05.18 @ 21:36) >    EXAM:  CT ABDOMEN AND PELVIS                            PROCEDURE DATE:  10/05/2018          INTERPRETATION:  CT ABDOMEN AND PELVIS WITHOUT CONTRAST    INDICATION: Sepsis and abdominal distention.    TECHNIQUE: Abdominopelvic CT without intravenouscontrast.Images are   reformatted in the sagittal and coronal planes.    COMPARISON: CT abdomen pelvis 2018.    FINDINGS:    Absence of intravenous contrast limits evaluation for focal lesions,   neoplasm, and vascular pathology.    Lower Thorax: No consolidation or effusion. Incompletely characterized   oblong right lower lobe opacity which nonemergent CT chest follow-up is   advised for better characterization.    Liver: Redemonstrated incompletely characterize right hepatic lobe   hypodensity during 3 x 1.6 cm on image 19 of series 2 for which   nonemergent liver protocol MR is advised provided no contraindications.  Biliary: No dilatation. Uncomplicated cholelithiasis.  Spleen: No suspicious lesions.      Pancreas: No inflammatory changes or ductal dilatation.      Adrenals: Normal.      Kidneys: No hydronephrosis. Stable scattered renal hypodense and   hyperdense lesions.  Vessels: Normal caliber. Atherosclerotic disease of the aorta and its   branches with associated diffuse dense vascular calcifications.   Infrarenal IVC filter noted. Vascular surgical clips are seen in the   right groin region. Right external iliac artery vascular stent identified.    GI tract: Gastric clips again noted at the fundus. Trace pericolonic   stranding at the level of proximal to mid descending colon. Clinical   correlation is advised to assess for mild colitis.No significant wall   thickening. Moderate fecal burden. No evidence of small bowel   obstruction. Normal-appearing appendix.    Peritoneum/retroperitoneum and mesentery: No free air. No organized fluid   collection. No adenopathy.        Pelvic organs/Bladder: Diffuse bladder wall thickening with trace   perivesicular stranding. Correlate with urinalysis to assess for cystitis   in appropriate clinical setting. Consider nonemergent retrograde   evaluation is concern for bladder malignancy. Heterogenous prostate   containing calcifications.    Abdominal wall: A small umbilical hernia containing portion of   obstructive bowelloop noted.  Bones and soft tissues: Multilevel degenerative changes of the spine   noted stable mild compression deformity of T12 vertebral body.   Degenerative changes of bilateral hip joints with nonspecific lucencies   in the right femoral head. Recommend clinical correlation to assess for   avascular necrosis.    IMPRESSION:    Trace pericolonic stranding at the level of proximal to mid descending   colon. Clinical correlation is advised to assess for mild colitis.No   significant wall thickening. Moderate fecal burden. No evidence of small   bowel obstruction or extraluminal collection.     Redemonstrated incompletely characterize right hepatic lobe hypodensity   during 3 x 1.6 cm for which nonemergent liver protocol MR is advised   provided no contraindications.    Diffuse bladder wall thickening with trace perivesicular stranding.   Correlate with urinalysis to assess for cystitis in appropriate clinical   setting. Consider nonemergent retrograde evaluation is concern for   bladder malignancy.    Uncomplicated cholelithiasis.    Additional findings as detailed above.      Advanced directives addressed: full resuscitation

## 2018-10-11 NOTE — PROGRESS NOTE ADULT - ASSESSMENT
82 y/o M PMHx significant for CAD s/p stents, AFib, diastolic CHF, hx of upper GI bleeding, PVD, hypertension, hypertension, severe COPD (O2 dependent), SHAYY on bipap at night, ESRD on HD ([facility]), recurrent c. diff who was admitted for:     1) Fevers due to Klebsiella  UTI/ cystitis ESBL  - fevers resolved   - leukocytosis better   - CT reviewed   - No diarrhea, off isolation   - F/u BCX: NGTD   - UCX: + Klebsiella   - C/w [id number] ->meropenem as per ID   - On PO Vanco for Cdiff PPxs       2)CAD/AFib  -  rate control   - c/w  Diltiazem 30mg po q6h  - cont. ASA 81mg po daily  - restarted on coumadin 5 mg, pt/inr daily  -cardiology consult - for risk stratification and choice of  AC   - d/w Dr. Carvalho - ok to restart AC with increased risk of bleeding     3)BPH  - c/w  Doxazosin 8mg po qHS  - c/w Dutasteride 0.5mg po qHS    4) COPD/SHAYY  - stable respiratory status   -CXR: neg   -cont. Budesonide nebs  -cont. supplemental O2 PRN   - BiPAP at night    5)ESRD  - C/w HD as per Renal     6) PAD, s/p R AKA, Chronic LLE ulcers   wound care eval  wound orders as per SX   Dr kailee toussaint - for AVF  evaluation     7) Allergic rhinitis  - restart claritin        6)Vte ppx

## 2018-10-11 NOTE — PHYSICAL THERAPY INITIAL EVALUATION ADULT - PERTINENT HX OF CURRENT PROBLEM, REHAB EVAL
Pt admitted to  secondary to fever and palpitations following dialysis. Bilateral UE dopplers: non-occlusive thrombus seen within the right antecubital cephalic vein. H/H- 9.2/29.6.

## 2018-10-11 NOTE — DIETITIAN INITIAL EVALUATION ADULT. - OTHER INFO
82 y/o male admitted for fever and palpatations. PMHx significant for CAD s/p stents, AFib, CHF, hx of upper GI bleeding, PVD, hypertension, hypertension, severe COPD, SHAYY, ESRD on HD, recurrent c. diff and above the knee amputation. Jalen score of 16 with stage 1 pressure ucler on sacrum and trace edema on left foot. No difficulties chewing or swallowing and no N/V/D/C reported at this time. Pt reported having a good appetite both PTA and during his stay. Pt had his right leg amputated above the knee about a year and a half ago. Since then, he has been in and out of hospitals and assited living facilities. Before the amputation he was 235# and has been at a steady decline since then. His weight has been stable at 145# for the past 6 months. NFPE preformed and showed mild temporal wasting, however, there is no evidence to show a malnutrition diagnosis at this time. His daughter was also present at time of the interview and has been helping him manage his diet since he started dialysis. He seemed to be complient, knowing which foods to limit but did need further education. Handouts and education on a renal diet given to pt and his daughter. Pt will likely be discharged back to his assisted living after his stay. Pts daughter also requesting Pro-Source supplement to be given. Reccomendations 1) Continue current DASH/TLC Renal restricted diet 2) Supplement with Pro-Source BID 3) Monitor daily weights 4) Continue to monitor nutrition status

## 2018-10-11 NOTE — PHYSICAL THERAPY INITIAL EVALUATION ADULT - ACTIVE RANGE OF MOTION EXAMINATION, REHAB EVAL
no Active ROM deficits were identified/bilateral Shoulder flex ~ 150 degrees. Left LE unable to fully assess secondary to pain. PROM left ankle DF ~-10 degrees. Right LE AROM WFL(AKA).

## 2018-10-11 NOTE — PROGRESS NOTE ADULT - SUBJECTIVE AND OBJECTIVE BOX
Patient is a 83y old  Male who presents with a chief complaint of Fever and palpitations.    HPI:  84 y/o M PMHx significant for CAD s/p stents, AFib, diastolic CHF, hx of upper GI bleeding, PVD, hypertension, hypertension, severe COPD (O2 dependent), SHAYY on bipap at night, ESRD on HD (MWF), recurrent c. diff who was BIBA from dialysis ([facility]) where he was found to have a post-dialysis fever associated w/ tachycardia.  He was being evaluated for fever in the hospital.  Cardiology team consulted for evaluation and risk stratification for anticoagulation for atrial fibrillation.  Pt is not on anticoagulation as an outpt secondary to massive GI bleed in past.  Pt denies any CP or SOB today.      10/11- pt seen and examined by me today.  Pt denies any black stools, or bleeding per rectum.    PAST MEDICAL & SURGICAL HISTORY:  Above knee amputation of right lower extremity  Recurrent Clostridium difficile diarrhea  Diastolic CHF  Peripheral vascular disease  Afib  Anemia  CKD (chronic kidney disease)  COPD (chronic obstructive pulmonary disease)  SHAYY (obstructive sleep apnea)  Sepsis, due to unspecified organism: 2/2 poorly healing wounds b/l  Dyspepsia: On moderate exertion.  Sleep apnea, obstructive: Requires home 02 therapy, and treatment with BIPAP  Atelectasis  Pleural effusion, bilateral  Respiratory failure  Peripheral edema  CRI (chronic renal insufficiency)  Gout  Benign prostatic hypertrophy  Spinal stenosis  Hypercholesterolemia  GERD (gastroesophageal reflux disease)  CAD (coronary artery disease)  Hypertension  S/P angioplasty with stent  Cataract of left eye  Prostate: Surgery green light procedure.  S/P rotator cuff surgery: Right  S/P angioplasty      MEDICATIONS  (STANDING):  allopurinol 100 milliGRAM(s) Oral daily  aspirin  chewable 81 milliGRAM(s) Oral daily  buDESOnide   0.5 milliGRAM(s) Respule 0.5 milliGRAM(s) Inhalation two times a day  diltiazem    Tablet 30 milliGRAM(s) Oral every 6 hours  doxazosin 8 milliGRAM(s) Oral at bedtime  finasteride 5 milliGRAM(s) Oral daily  heparin  Injectable 5000 Unit(s) SubCutaneous every 8 hours  meropenem  IVPB 500 milliGRAM(s) IV Intermittent every 24 hours  simvastatin 10 milliGRAM(s) Oral at bedtime  vancomycin    Solution 125 milliGRAM(s) Oral every 6 hours    MEDICATIONS  (PRN):  acetaminophen   Tablet .. 650 milliGRAM(s) Oral every 6 hours PRN Temp greater or equal to 38C (100.4F), Mild Pain (1 - 3)  ondansetron Injectable 4 milliGRAM(s) IV Push every 6 hours PRN Nausea      FAMILY HISTORY:  Family history of colorectal cancer (Father)  Family history of diabetes mellitus (Mother, Sibling)  Family history of hypertension (Mother)      SOCIAL HISTORY:  no smoking or alcohol use recently.     REVIEW OF SYSTEMS:  CONSTITUTIONAL:    No fatigue, malaise, lethargy.  No fever or chills.  HEENT:  Eyes:  No visual changes.     ENT:  No epistaxis.  No sinus pain.    RESPIRATORY:  No cough.  No wheeze.  No hemoptysis.  No shortness of breath.  CARDIOVASCULAR:  No chest pains.  No palpitations. No shortness of breath, No orthopnea or PND.  GASTROINTESTINAL:  No abdominal pain.  No nausea or vomiting.    GENITOURINARY:    No hematuria.    MUSCULOSKELETAL:  No musculoskeletal pain.  No joint swelling.  No arthritis.  NEUROLOGICAL:  No tingling or numbness or weakness.  PSYCHIATRIC:  No confusion  SKIN:  No rashes.    ENDOCRINE:   No polydipsia.   HEMATOLOGIC:  c/o anemia.  No prolonged or excessive bleeding.   ALLERGIC AND IMMUNOLOGIC:  No pruritus.          Vital Signs Last 24 Hrs  T(C): 37.1 (09 Oct 2018 11:57), Max: 37.1 (09 Oct 2018 08:12)  T(F): 98.8 (09 Oct 2018 11:57), Max: 98.8 (09 Oct 2018 08:12)  HR: 89 (09 Oct 2018 12:13) (61 - 89)  BP: 133/54 (09 Oct 2018 12:13) (116/50 - 179/48)  BP(mean): --  RR: 18 (09 Oct 2018 11:57) (18 - 18)  SpO2: 98% (09 Oct 2018 05:00) (97% - 100%)    PHYSICAL EXAM-    Constitutional: The patient appears to be normal, well developed, well nourished and alert and oriented to time, place and person. The patient does not appear acutely ill.     Head: Head is normocephalic and atraumatic.      Neck: No jugular venous distention. No audible carotid bruits. There are strong carotid pulses bilaterally. No JVD.     Cardiovascular: Regular rate and rhythm without S3, S4. No murmurs or rubs are appreciated.      Respiratory: Breath sounds are normal. No rales. No wheezing.    Abdomen: Soft, nontender, nondistended with positive bowel sounds.      Extremity: R above knee amputation and L foot in boot.     Neurologic: The patient is alert and oriented.      Skin: No rash, no obvious lesions noted.      Psychiatric: The patient appears to be emotionally stable.      INTERPRETATION OF TELEMETRY: not on     ECG: Sinus tachycardia.     I&O's Detail      LABS:                        8.9    3.58  )-----------( 141      ( 09 Oct 2018 08:25 )             28.8     10-09    149<H>  |  114<H>  |  56<H>  ----------------------------<  110<H>  3.5   |  21<L>  |  2.59<H>    Ca    7.7<L>      09 Oct 2018 08:25  Phos  4.6     10-09    TPro  x   /  Alb  1.6<L>  /  TBili  x   /  DBili  x   /  AST  x   /  ALT  x   /  AlkPhos  x   10-09            I&O's Summary    BNP  RADIOLOGY & ADDITIONAL STUDIES:

## 2018-10-11 NOTE — PROGRESS NOTE ADULT - SUBJECTIVE AND OBJECTIVE BOX
NEPHROLOGY INTERVAL HPI/OVERNIGHT EVENTS:  10/11 SY  No acute events overnight.  Feeling fairly well.  Complains of pain in Left LE    HPI:  84 y/o M PMHx significant for CAD s/p stents, AFib, diastolic CHF, hx of upper GI bleeding, PVD, hypertension, hypertension, severe COPD (O2 dependent), SHAYY on bipap at night, ESRD on HD (MWF), recurrent c. diff who was BIBA from dialysis ([facility]) where he was found to have a post-dialysis fever associated w/ tachycardia. In triage => /min, Tmax 102.7'F. Labs => LA 1.2, BUN/Cr 27/1.59, Alb 2.7, Phos 4.2. UA (+), RVP (-). CT ABD/Pelvis => Trace pericolonic stranding at the level of proximal to mid descending colon. Clinical correlation is advised to assess for mild colitis. No significant wall thickening. Moderate fecal burden. No evidence of small bowel obstruction or extraluminal collection. Re-demonstrated incompletely characterize right hepatic lobe hypodensity during 3 x 1.6 cm for which nonemergent liver protocol MR is advised provided no contraindications. Diffuse bladder wall thickening with trace perivesicular stranding. Correlate with urinalysis to assess for cystitis in appropriate clinical setting. Consider nonemergent retrograde evaluation is concern for bladder malignancy. Uncomplicated cholelithiasis. In the ED the patient was given [id number] IVPB x 1, [id number] IVPB x 1, NS 1.5L x 1.   Seen in ER with daughter at bedside  chart reviewed case d/w HD nurses  possible UTI  blood cx done          MEDICATIONS  (STANDING):  allopurinol 100 milliGRAM(s) Oral daily  aspirin  chewable 81 milliGRAM(s) Oral daily  buDESOnide   0.5 milliGRAM(s) Respule 0.5 milliGRAM(s) Inhalation two times a day  diltiazem    Tablet 30 milliGRAM(s) Oral every 6 hours  doxazosin 8 milliGRAM(s) Oral at bedtime  finasteride 5 milliGRAM(s) Oral daily  heparin  Injectable 5000 Unit(s) SubCutaneous every 8 hours  loratadine 10 milliGRAM(s) Oral daily  meropenem  IVPB 500 milliGRAM(s) IV Intermittent every 24 hours  pantoprazole    Tablet 40 milliGRAM(s) Oral before breakfast  simvastatin 10 milliGRAM(s) Oral at bedtime  vancomycin    Solution 125 milliGRAM(s) Oral every 6 hours  warfarin 5 milliGRAM(s) Oral daily    MEDICATIONS  (PRN):  acetaminophen   Tablet .. 650 milliGRAM(s) Oral every 6 hours PRN Temp greater or equal to 38C (100.4F), Mild Pain (1 - 3)  ondansetron Injectable 4 milliGRAM(s) IV Push every 6 hours PRN Nausea          Vital Signs Last 24 Hrs  T(C): 36.7 (11 Oct 2018 04:25), Max: 36.9 (10 Oct 2018 16:43)  T(F): 98 (11 Oct 2018 04:25), Max: 98.5 (10 Oct 2018 16:43)  HR: 80 (11 Oct 2018 08:04) (66 - 80)  BP: 139/35 (11 Oct 2018 04:25) (105/35 - 139/35)  BP(mean): --  RR: 19 (11 Oct 2018 04:25) (19 - 20)  SpO2: 98% (11 Oct 2018 04:25) (95% - 100%)  Daily     Daily       PHYSICAL EXAM:  Alert and appropriate  GENERAL: No distress  CHEST/LUNG: Clear to aus  HEART: S1S2 RRR  ABDOMEN: soft  EXTREMITIES: decreased edema.  SKIN:     LABS:                        9.2    4.57  )-----------( 146      ( 11 Oct 2018 07:04 )             29.5     10-11    140  |  106  |  41<H>  ----------------------------<  79  3.7   |  22  |  2.64<H>    Ca    7.7<L>      11 Oct 2018 07:04      PT/INR - ( 11 Oct 2018 07:04 )   PT: 11.7 sec;   INR: 1.08 ratio                     RADIOLOGY & ADDITIONAL TESTS:

## 2018-10-11 NOTE — CONSULT NOTE ADULT - SUBJECTIVE AND OBJECTIVE BOX
Consult Note:   · Provider Specialty	Cardiology	    Referral/Consultation:   Initial Consult:  · Requested by Name:	Dr Howe	  · Date/Time:	09-Oct-2018 17:20	  · Reason for Referral/Consultation:	Risk stratification for anticoagulation with afib	  · Reason for Admission	Fever and palpitations	      · Subjective and Objective: 	  Patient is a 83y old  Male who presents with a chief complaint of Fever and palpitations.    HPI:  84 y/o M PMHx significant for CAD s/p stents, AFib, diastolic CHF, hx of upper GI bleeding, PVD, hypertension, hypertension, severe COPD (O2 dependent), SHAYY on bipap at night, ESRD on HD (MWF), recurrent c. diff who was BIBA from dialysis ([facility]) where he was found to have a post-dialysis fever associated w/ tachycardia.  He was being evaluated for fever in the hospital.  Cardiology team consulted for evaluation and risk stratification for anticoagulation for atrial fibrillation.  Pt is not on anticoagulation as an outpt secondary to massive GI bleed in past.  Pt denies any CP or SOB today.      History as above. 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  He has known significant PAD of L leg and severe venous disease as well.  He has developed an ulcer of the posterior L calf that has been treated with local wound care.  He denies ischemic rest pain and his L foot is without ulcers or gangrene.   The patient is currently being dialyzed and vascular has been consulted for creation of access.  He is R handed. Vein mapping demonstrated acceptable R cephalic and basilic veins.      PAST MEDICAL & SURGICAL HISTORY:  Above knee amputation of right lower extremity  Recurrent Clostridium difficile diarrhea  Diastolic CHF  Peripheral vascular disease  Afib  Anemia  CKD (chronic kidney disease)  COPD (chronic obstructive pulmonary disease)  SHAYY (obstructive sleep apnea)  Sepsis, due to unspecified organism: 2/2 poorly healing wounds b/l  Dyspepsia: On moderate exertion.  Sleep apnea, obstructive: Requires home 02 therapy, and treatment with BIPAP  Atelectasis  Pleural effusion, bilateral  Respiratory failure  Peripheral edema  CRI (chronic renal insufficiency)  Gout  Benign prostatic hypertrophy  Spinal stenosis  Hypercholesterolemia  GERD (gastroesophageal reflux disease)  CAD (coronary artery disease)  Hypertension  S/P angioplasty with stent  Cataract of left eye  Prostate: Surgery green light procedure.  S/P rotator cuff surgery: Right  S/P angioplasty      MEDICATIONS  (STANDING):  allopurinol 100 milliGRAM(s) Oral daily  aspirin  chewable 81 milliGRAM(s) Oral daily  buDESOnide   0.5 milliGRAM(s) Respule 0.5 milliGRAM(s) Inhalation two times a day  diltiazem    Tablet 30 milliGRAM(s) Oral every 6 hours  doxazosin 8 milliGRAM(s) Oral at bedtime  finasteride 5 milliGRAM(s) Oral daily  heparin  Injectable 5000 Unit(s) SubCutaneous every 8 hours  meropenem  IVPB 500 milliGRAM(s) IV Intermittent every 24 hours  simvastatin 10 milliGRAM(s) Oral at bedtime  vancomycin    Solution 125 milliGRAM(s) Oral every 6 hours    MEDICATIONS  (PRN):  acetaminophen   Tablet .. 650 milliGRAM(s) Oral every 6 hours PRN Temp greater or equal to 38C (100.4F), Mild Pain (1 - 3)  ondansetron Injectable 4 milliGRAM(s) IV Push every 6 hours PRN Nausea      FAMILY HISTORY:  Family history of colorectal cancer (Father)  Family history of diabetes mellitus (Mother, Sibling)  Family history of hypertension (Mother)      SOCIAL HISTORY:  no smoking or alcohol use recently.     REVIEW OF SYSTEMS:  CONSTITUTIONAL:    No fatigue, malaise, lethargy.  No fever or chills.  HEENT:  Eyes:  No visual changes.     ENT:  No epistaxis.  No sinus pain.    RESPIRATORY:  No cough.  No wheeze.  No hemoptysis.  No shortness of breath.  CARDIOVASCULAR:  No chest pains.  No palpitations. No shortness of breath, No orthopnea or PND.  GASTROINTESTINAL:  No abdominal pain.  No nausea or vomiting.    GENITOURINARY:    No hematuria.    MUSCULOSKELETAL:  No musculoskeletal pain.  No joint swelling.  No arthritis.  NEUROLOGICAL:  No tingling or numbness or weakness.  PSYCHIATRIC:  No confusion  SKIN:  No rashes.    ENDOCRINE:   No polydipsia.   HEMATOLOGIC:  c/o anemia.  No prolonged or excessive bleeding.   ALLERGIC AND IMMUNOLOGIC:  No pruritus.          Vital Signs Last 24 Hrs  T(C): 37.1 (09 Oct 2018 11:57), Max: 37.1 (09 Oct 2018 08:12)  T(F): 98.8 (09 Oct 2018 11:57), Max: 98.8 (09 Oct 2018 08:12)  HR: 89 (09 Oct 2018 12:13) (61 - 89)  BP: 133/54 (09 Oct 2018 12:13) (116/50 - 179/48)  BP(mean): --  RR: 18 (09 Oct 2018 11:57) (18 - 18)  SpO2: 98% (09 Oct 2018 05:00) (97% - 100%)    PHYSICAL EXAM-    Constitutional: The patient appears to be normal, well developed, well nourished and alert and oriented to time, place and person. The patient does not appear acutely ill.     Head: Head is normocephalic and atraumatic.      Neck: No jugular venous distention. No audible carotid bruits. There are strong carotid pulses bilaterally. No JVD.     Cardiovascular: Regular rate and rhythm without S3, S4. No murmurs or rubs are appreciated.      Respiratory: Breath sounds are normal. No rales. No wheezing.    Abdomen: Soft, nontender, nondistended with positive bowel sounds.      Extremity: well healed R above knee amputation; L foot intact and without ulcers or gangrene; venous stasis changes of lower leg; supf abrasions of anterior distal L leg; 2x 6 cm ulcer of posterior surface of distal L calf without evidence of infection such as cellulitis or lymphangitis.     Neurologic: The patient is alert and oriented.      Skin: No rash, no obvious lesions noted.      Psychiatric: The patient appears to be emotionally stable.      INTERPRETATION OF TELEMETRY: not on     ECG: Sinus tachycardia.     I&O's Detail      LABS:                        8.9    3.58  )-----------( 141      ( 09 Oct 2018 08:25 )             28.8     10-09    149<H>  |  114<H>  |  56<H>  ----------------------------<  110<H>  3.5   |  21<L>  |  2.59<H>    Ca    7.7<L>      09 Oct 2018 08:25  Phos  4.6     10-09    TPro  x   /  Alb  1.6<L>  /  TBili  x   /  DBili  x   /  AST  x   /  ALT  x   /  AlkPhos  x   10-09            I&O's Summary    BNP  RADIOLOGY & ADDITIONAL STUDIES:          Assessment and Recommendation:   · Assessment		      83 year old with multiple significant medical problems and CKD V on dialysis.  Will discuss with renal re creation of access and will plan for R brachiocephalic or brachiobasilic AVF.    In addition, he has significant arterial disease of L leg and non invasives from ~ 3 years ago demonstrating moderate to severe PAD of L leg.  Although he does not have ischemic rest pain of L foot, I suspect his posterior calf ulcer is partly secondary to impaired perfusion and will discuss with patient and family re angiography and potentially catheter based procedure to improve L leg circulation.  If it is believed patient's renal function may recover, I would be conservative with angiography for the time being. Consult Note:   · Provider Specialty	Cardiology	    Referral/Consultation:   Initial Consult:  · Requested by Name:	Dr Howe	  · Date/Time:	09-Oct-2018 17:20	  · Reason for Referral/Consultation:	Risk stratification for anticoagulation with afib	  · Reason for Admission	Fever and palpitations	      · Subjective and Objective: 	  Patient is a 83y old  Male who presents with a chief complaint of Fever and palpitations.    HPI:  84 y/o M PMHx significant for CAD s/p stents, AFib, diastolic CHF, hx of upper GI bleeding, PVD, hypertension, hypertension, severe COPD (O2 dependent), SHAYY on bipap at night, ESRD on HD (MWF), recurrent c. diff who was BIBA from dialysis ([facility]) where he was found to have a post-dialysis fever associated w/ tachycardia.  He was being evaluated for fever in the hospital.  Cardiology team consulted for evaluation and risk stratification for anticoagulation for atrial fibrillation.  Pt is not on anticoagulation as an outpt secondary to massive GI bleed in past.  Pt denies any CP or SOB today.      History as above. 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  He has known significant PAD of L leg and severe venous disease as well.  He has developed an ulcer of the posterior L calf that has been treated with local wound care.  He denies ischemic rest pain and his L foot is without ulcers or gangrene.   The patient is currently being dialyzed and vascular has been consulted for creation of access.  He is R handed. Vein mapping demonstrated acceptable R cephalic and basilic veins.      PAST MEDICAL & SURGICAL HISTORY:  Above knee amputation of right lower extremity  Recurrent Clostridium difficile diarrhea  Diastolic CHF  Peripheral vascular disease  Afib  Anemia  CKD (chronic kidney disease)  COPD (chronic obstructive pulmonary disease)  SHAYY (obstructive sleep apnea)  Sepsis, due to unspecified organism: 2/2 poorly healing wounds b/l  Dyspepsia: On moderate exertion.  Sleep apnea, obstructive: Requires home 02 therapy, and treatment with BIPAP  Atelectasis  Pleural effusion, bilateral  Respiratory failure  Peripheral edema  CRI (chronic renal insufficiency)  Gout  Benign prostatic hypertrophy  Spinal stenosis  Hypercholesterolemia  GERD (gastroesophageal reflux disease)  CAD (coronary artery disease)  Hypertension  S/P angioplasty with stent  Cataract of left eye  Prostate: Surgery green light procedure.  S/P rotator cuff surgery: Right  S/P angioplasty      MEDICATIONS  (STANDING):  allopurinol 100 milliGRAM(s) Oral daily  aspirin  chewable 81 milliGRAM(s) Oral daily  buDESOnide   0.5 milliGRAM(s) Respule 0.5 milliGRAM(s) Inhalation two times a day  diltiazem    Tablet 30 milliGRAM(s) Oral every 6 hours  doxazosin 8 milliGRAM(s) Oral at bedtime  finasteride 5 milliGRAM(s) Oral daily  heparin  Injectable 5000 Unit(s) SubCutaneous every 8 hours  meropenem  IVPB 500 milliGRAM(s) IV Intermittent every 24 hours  simvastatin 10 milliGRAM(s) Oral at bedtime  vancomycin    Solution 125 milliGRAM(s) Oral every 6 hours    MEDICATIONS  (PRN):  acetaminophen   Tablet .. 650 milliGRAM(s) Oral every 6 hours PRN Temp greater or equal to 38C (100.4F), Mild Pain (1 - 3)  ondansetron Injectable 4 milliGRAM(s) IV Push every 6 hours PRN Nausea      FAMILY HISTORY:  Family history of colorectal cancer (Father)  Family history of diabetes mellitus (Mother, Sibling)  Family history of hypertension (Mother)      SOCIAL HISTORY:  no smoking or alcohol use recently.     REVIEW OF SYSTEMS:  CONSTITUTIONAL:    No fatigue, malaise, lethargy.  No fever or chills.  HEENT:  Eyes:  No visual changes.     ENT:  No epistaxis.  No sinus pain.    RESPIRATORY:  No cough.  No wheeze.  No hemoptysis.  No shortness of breath.  CARDIOVASCULAR:  No chest pains.  No palpitations. No shortness of breath, No orthopnea or PND.  GASTROINTESTINAL:  No abdominal pain.  No nausea or vomiting.    GENITOURINARY:    No hematuria.    MUSCULOSKELETAL:  No musculoskeletal pain.  No joint swelling.  No arthritis.  NEUROLOGICAL:  No tingling or numbness or weakness.  PSYCHIATRIC:  No confusion  SKIN:  No rashes.    ENDOCRINE:   No polydipsia.   HEMATOLOGIC:  c/o anemia.  No prolonged or excessive bleeding.   ALLERGIC AND IMMUNOLOGIC:  No pruritus.          Vital Signs Last 24 Hrs  T(C): 37.1 (09 Oct 2018 11:57), Max: 37.1 (09 Oct 2018 08:12)  T(F): 98.8 (09 Oct 2018 11:57), Max: 98.8 (09 Oct 2018 08:12)  HR: 89 (09 Oct 2018 12:13) (61 - 89)  BP: 133/54 (09 Oct 2018 12:13) (116/50 - 179/48)  BP(mean): --  RR: 18 (09 Oct 2018 11:57) (18 - 18)  SpO2: 98% (09 Oct 2018 05:00) (97% - 100%)    PHYSICAL EXAM-    Constitutional: The patient appears to be normal, well developed, well nourished and alert and oriented to time, place and person. The patient does not appear acutely ill.     Head: Head is normocephalic and atraumatic.      Neck: No jugular venous distention. No audible carotid bruits. There are strong carotid pulses bilaterally. No JVD.     Cardiovascular: Regular rate and rhythm without S3, S4. No murmurs or rubs are appreciated.      Respiratory: Breath sounds are normal. No rales. No wheezing.    Abdomen: Soft, nontender, nondistended with positive bowel sounds.      Extremity: well healed R above knee amputation; L foot intact and without ulcers or gangrene; venous stasis changes of lower leg; supf abrasions of anterior distal L leg; 2x 6 cm ulcer of posterior surface of distal L calf without evidence of infection such as cellulitis or lymphangitis. Palpable femoral pulses bilaterally without palpable distal L pulse.    Neurologic: The patient is alert and oriented.      Skin: No rash, no obvious lesions noted.      Psychiatric: The patient appears to be emotionally stable.      INTERPRETATION OF TELEMETRY: not on     ECG: Sinus tachycardia.     I&O's Detail      LABS:                        8.9    3.58  )-----------( 141      ( 09 Oct 2018 08:25 )             28.8     10-09    149<H>  |  114<H>  |  56<H>  ----------------------------<  110<H>  3.5   |  21<L>  |  2.59<H>    Ca    7.7<L>      09 Oct 2018 08:25  Phos  4.6     10-09    TPro  x   /  Alb  1.6<L>  /  TBili  x   /  DBili  x   /  AST  x   /  ALT  x   /  AlkPhos  x   10-09            I&O's Summary    BNP  RADIOLOGY & ADDITIONAL STUDIES:          Assessment and Recommendation:   · Assessment		      83 year old with multiple significant medical problems and CKD V on dialysis.  Will discuss with renal re creation of access and will plan for R brachiocephalic or brachiobasilic AVF.    In addition, he has significant arterial disease of L leg and non invasives from ~ 3 years ago demonstrating moderate to severe PAD of L leg.  Although he does not have ischemic rest pain of L foot, I suspect his posterior calf ulcer is partly secondary to impaired perfusion and will discuss with patient and family re angiography and potentially catheter based procedure to improve L leg circulation.  If it is believed patient's renal function may recover, I would be conservative with angiography for the time being.    Hold coumadin in preparation for fistula creation.

## 2018-10-11 NOTE — PROGRESS NOTE ADULT - ASSESSMENT
84 y/o M PMHx significant for CAD s/p stents, AFib, diastolic CHF, hx of upper GI bleeding, PVD, hypertension, hypertension, severe COPD (O2 dependent), SHAYY on bipap at night, ESRD on HD (MWF), recurrent c. diff who was BIBA from dialysis ([facility]) where he was found to have a post-dialysis fever associated w/ tachycardia. In triage => /min, Tmax 102.7'F.   Seen in ER with daughter at bedside  chart reviewed case d/w HD nurses  possible UTI  blood cx done    a/p  ESRD on HD   dialysis via cvc  possible UTI  evaluate for bacteremia as well  duplex of UE for avf    10/7   febrile syndrome  cystitis  on Cefepime IV  Vanco po  HD MWF  vein mapping UE  Dr Clement evaluating for possible avf    10/8 SY  --ARAY/CKD   For now HD dependent at BIW tx schedule.  Will plan for HD in am.  Creat trending down.   Continue to monitor for renal recovery.  --UTI with resistant Klebsiella.  Started on Meropenem today.  --Plan for AVF once ID cleared and if pt does remain on hd this admission.    10/9  ESBL in urine switched to Meropenem   feels well  no new events  on isolation  4 k bath    10/10 MK  - arya/ckd for next hd on friday, remains hd dependent  - esbl uti: on meropenem  - avf after ID clearance, fu trend of the cr    10/11 SY  --ARYA/CKD  Creat is slowly trending down, however, remains oligoanuric with significant fluid weight gain intradialytic period.  plan to continue BIW HD for now.  Plan for AVF this admission if feasible.  --CHF : well compensated on HD.  Again pt may be dependent of HD to prevent CHF decompensation.  --ESBL UTI : Meropenem until tomorrow.  --Vascular eval for LLE wound and AVF.

## 2018-10-12 LAB
ALBUMIN SERPL ELPH-MCNC: 2.4 G/DL — LOW (ref 3.3–5)
ANION GAP SERPL CALC-SCNC: 10 MMOL/L — SIGNIFICANT CHANGE UP (ref 5–17)
BUN SERPL-MCNC: 51 MG/DL — HIGH (ref 7–23)
CALCIUM SERPL-MCNC: 7.7 MG/DL — LOW (ref 8.5–10.1)
CHLORIDE SERPL-SCNC: 109 MMOL/L — HIGH (ref 96–108)
CO2 SERPL-SCNC: 24 MMOL/L — SIGNIFICANT CHANGE UP (ref 22–31)
CREAT SERPL-MCNC: 2.98 MG/DL — HIGH (ref 0.5–1.3)
GLUCOSE SERPL-MCNC: 87 MG/DL — SIGNIFICANT CHANGE UP (ref 70–99)
HCT VFR BLD CALC: 29.1 % — LOW (ref 39–50)
HGB BLD-MCNC: 9.1 G/DL — LOW (ref 13–17)
INR BLD: 1.17 RATIO — HIGH (ref 0.88–1.16)
MCHC RBC-ENTMCNC: 29.3 PG — SIGNIFICANT CHANGE UP (ref 27–34)
MCHC RBC-ENTMCNC: 31.3 GM/DL — LOW (ref 32–36)
MCV RBC AUTO: 93.6 FL — SIGNIFICANT CHANGE UP (ref 80–100)
NRBC # BLD: 0 /100 WBCS — SIGNIFICANT CHANGE UP (ref 0–0)
PHOSPHATE SERPL-MCNC: 6.8 MG/DL — HIGH (ref 2.5–4.5)
PLATELET # BLD AUTO: 160 K/UL — SIGNIFICANT CHANGE UP (ref 150–400)
POTASSIUM SERPL-MCNC: 3.9 MMOL/L — SIGNIFICANT CHANGE UP (ref 3.5–5.3)
POTASSIUM SERPL-SCNC: 3.9 MMOL/L — SIGNIFICANT CHANGE UP (ref 3.5–5.3)
PROTHROM AB SERPL-ACNC: 12.7 SEC — SIGNIFICANT CHANGE UP (ref 9.8–12.7)
RBC # BLD: 3.11 M/UL — LOW (ref 4.2–5.8)
RBC # FLD: 15 % — HIGH (ref 10.3–14.5)
SODIUM SERPL-SCNC: 143 MMOL/L — SIGNIFICANT CHANGE UP (ref 135–145)
WBC # BLD: 4.7 K/UL — SIGNIFICANT CHANGE UP (ref 3.8–10.5)
WBC # FLD AUTO: 4.7 K/UL — SIGNIFICANT CHANGE UP (ref 3.8–10.5)

## 2018-10-12 RX ORDER — ERYTHROPOIETIN 10000 [IU]/ML
6000 INJECTION, SOLUTION INTRAVENOUS; SUBCUTANEOUS
Qty: 0 | Refills: 0 | Status: DISCONTINUED | OUTPATIENT
Start: 2018-10-12 | End: 2018-10-13

## 2018-10-12 RX ADMIN — LORATADINE 10 MILLIGRAM(S): 10 TABLET ORAL at 13:10

## 2018-10-12 RX ADMIN — PANTOPRAZOLE SODIUM 40 MILLIGRAM(S): 20 TABLET, DELAYED RELEASE ORAL at 05:46

## 2018-10-12 RX ADMIN — Medication 8 MILLIGRAM(S): at 23:04

## 2018-10-12 RX ADMIN — Medication 81 MILLIGRAM(S): at 13:10

## 2018-10-12 RX ADMIN — SIMVASTATIN 10 MILLIGRAM(S): 20 TABLET, FILM COATED ORAL at 23:04

## 2018-10-12 RX ADMIN — FINASTERIDE 5 MILLIGRAM(S): 5 TABLET, FILM COATED ORAL at 13:10

## 2018-10-12 RX ADMIN — Medication 125 MILLIGRAM(S): at 23:04

## 2018-10-12 RX ADMIN — MEROPENEM 100 MILLIGRAM(S): 1 INJECTION INTRAVENOUS at 13:11

## 2018-10-12 RX ADMIN — ERYTHROPOIETIN 6000 UNIT(S): 10000 INJECTION, SOLUTION INTRAVENOUS; SUBCUTANEOUS at 11:57

## 2018-10-12 RX ADMIN — HEPARIN SODIUM 5000 UNIT(S): 5000 INJECTION INTRAVENOUS; SUBCUTANEOUS at 23:04

## 2018-10-12 RX ADMIN — HEPARIN SODIUM 5000 UNIT(S): 5000 INJECTION INTRAVENOUS; SUBCUTANEOUS at 05:46

## 2018-10-12 RX ADMIN — Medication 125 MILLIGRAM(S): at 17:56

## 2018-10-12 RX ADMIN — HEPARIN SODIUM 5000 UNIT(S): 5000 INJECTION INTRAVENOUS; SUBCUTANEOUS at 13:10

## 2018-10-12 RX ADMIN — Medication 100 MILLIGRAM(S): at 13:10

## 2018-10-12 RX ADMIN — Medication 125 MILLIGRAM(S): at 05:46

## 2018-10-12 RX ADMIN — Medication 125 MILLIGRAM(S): at 13:10

## 2018-10-12 NOTE — PROGRESS NOTE ADULT - SUBJECTIVE AND OBJECTIVE BOX
CC: Fever and palpitations     HPI:     84 y/o M PMHx significant for CAD s/p stents, AFib, diastolic CHF, hx of upper GI bleeding, PVD, hypertension, hypertension, severe COPD (O2 dependent), SHAYY on bipap at night, ESRD on HD (MWF), recurrent c. diff who was BIBA on 10/5/18  from dialysis ([facility]) where he was found to have a post-dialysis fever associated w/ tachycardia. In triage => /min, Tmax 102.7'F. Labs => LA 1.2, BUN/Cr 27/1.59, Alb 2.7, Phos 4.2. UA (+), RVP (-). CT ABD/Pelvis => Trace pericolonic stranding at the level of proximal to mid descending colon. Clinical correlation is advised to assess for mild colitis. No significant wall thickening. Moderate fecal burden. No evidence of small bowel obstruction or extraluminal collection. Re-demonstrated incompletely characterize right hepatic lobe hypodensity during 3 x 1.6 cm for which nonemergent liver protocol MR is advised provided no contraindications. Diffuse bladder wall thickening with trace perivesicular stranding. Correlate with urinalysis to assess for cystitis in appropriate clinical setting. Consider nonemergent retrograde evaluation is concern for bladder malignancy. Uncomplicated cholelithiasis. In the ED the patient was given [id number] IVPB x 1, [id number] IVPB x 1, NS 1.5L x 1.         INTERVAL HPI/ OVERNIGHT EVENTS:    10/7/18 Pt was seen and examined,  reports feeling well no complains. No fevers, no CP or SOB   10/8 /18 pt seen and examined, denies fever, chills, abdominal pain, tolerating PO diet, denies new sx  10/9/18 - pt seen and examined at HD, denies CP, dyspnea, weakness, afebrile  10/10/18 - pt seen and examined, denies fever, chills, + nasal congestion, denies new sx  10/11/18 - pt seen and examined , reports left leg pain, dressing removed feels better , denies fever, chills  10/12/18 - pt seen and examined, denies left leg pain, afebrile, tolerated HD well  REVIEW OF SYSTEMS:  All other review of systems is negative unless indicated above.  T(C): 37 (10-12-18 @ 17:27), Max: 37.1 (10-12-18 @ 11:58)  T(F): 98.6 (10-12-18 @ 17:27), Max: 98.8 (10-12-18 @ 11:58)  HR: 88 (10-12-18 @ 17:27) (69 - 88)  BP: 126/46 (10-12-18 @ 17:27) (109/50 - 172/53)  RR: 17 (10-12-18 @ 17:27) (16 - 18)  SpO2: 99% (10-12-18 @ 17:27) (94% - 99%)  Wt(kg): --  PHYSICAL EXAM:  General: Well developed; well nourished; in no acute distress  Eyes: PERRLA, EOMI; conjunctiva and sclera clear  Head: Normocephalic; atraumatic  ENMT: No nasal discharge; airway clear  Neck: Supple; non tender; no masses  Respiratory: Good air entry  No wheezes, rales or rhonchi  Cardiovascular: Regular rate and rhythm. S1 and S2 Normal; No murmurs  Gastrointestinal: Soft non-tender non-distended; Normal bowel sounds  Genitourinary: No costovertebral angle tenderness  Extremities: No edema, S/p R AKA  Vascular: Peripheral pulses diminished, LLE  Neurological: Alert and oriented x4  Skin: Warm and dry. No acute rash.  LLE with dressing and in a boot   Lymph Nodes: No acute cervical adenopathy  Musculoskeletal: Normal muscle tone, without deformities  Psychiatric: Cooperative and appropriate        LABS:  10-12    143  |  109<H>  |  51<H>  ----------------------------<  87  3.9   |  24  |  2.98<H>    Ca    7.7<L>      12 Oct 2018 08:20  Phos  6.8     10-12    TPro  x   /  Alb  2.4<L>  /  TBili  x   /  DBili  x   /  AST  x   /  ALT  x   /  AlkPhos  x   10-12                       9.1    4.70  )-----------( 160      ( 12 Oct 2018 08:20 )             29.1   LIVER FUNCTIONS - ( 12 Oct 2018 08:20 )  Alb: 2.4 g/dL / Pro: x     / ALK PHOS: x     / ALT: x     / AST: x     / GGT: x             PT/INR - ( 12 Oct 2018 08:20 )   PT: 12.7 sec;   INR: 1.17 ratio         10-11    140  |  106  |  41<H>  ----------------------------<  79  3.7   |  22  |  2.64<H>    Ca    7.7<L>      11 Oct 2018 07:04                         9.2    4.57  )-----------( 146      ( 11 Oct 2018 07:04 )             29.5     PT/INR - ( 11 Oct 2018 07:04 )   PT: 11.7 sec;   INR: 1.08 ratio        10-09    149<H>  |  114<H>  |  56<H>  ----------------------------<  110<H>  3.5   |  21<L>  |  2.59<H>    Ca    7.7<L>      09 Oct 2018 08:25  Phos  4.6     10-09    TPro  x   /  Alb  1.6<L>  /  TBili  x   /  DBili  x   /  AST  x   /  ALT  x   /  AlkPhos  x   10-09                      8.9    3.58  )-----------( 141      ( 09 Oct 2018 08:25 )             28.8     LIVER FUNCTIONS - ( 09 Oct 2018 08:25 )  Alb: 1.6 g/dL / Pro: x     / ALK PHOS: x     / ALT: x     / AST: x     / GGT: x           10-08    143  |  110<H>  |  58<H>  ----------------------------<  127<H>  3.9   |  23  |  2.84<H>    Ca    8.5      08 Oct 2018 11:25                          9.7    3.85  )-----------( 148      ( 08 Oct 2018 11:25 )             31.8                         9.9    4.93  )-----------( 142      ( 07 Oct 2018 11:18 )             32.4     10    144  |  112<H>  |  52<H>  ----------------------------<  131<H>  3.7   |  23  |  2.87<H>    Ca    8.5      07 Oct 2018 11:18  Phos  4.5     10-  Mg     1.7     10    TPro  5.8<L>  /  Alb  2.4<L>  /  TBili  0.4  /  DBili  x   /  AST  24  /  ALT  31  /  AlkPhos  63  10-06                       10.4   4.83  )-----------( 143      ( 05 Oct 2018 20:40 )             33.1     05 Oct 2018 20:40    139    |  106    |  27     ----------------------------<  105    3.5     |  23     |  1.59     Ca    8.2        05 Oct 2018 20:40    TPro  6.5    /  Alb  2.7    /  TBili  0.3    /  DBili  x      /  AST  28     /  ALT  36     /  AlkPhos  74     05 Oct 2018 20:40    PT/INR - ( 05 Oct 2018 20:40 )   PT: 12.3 sec;   INR: 1.14 ratio         PTT - ( 05 Oct 2018 20:40 )  PTT:59.5 sec  CAPILLARY BLOOD GLUCOSE        LIVER FUNCTIONS - ( 05 Oct 2018 20:40 )  Alb: 2.7 g/dL / Pro: 6.5 gm/dL / ALK PHOS: 74 U/L / ALT: 36 U/L / AST: 28 U/L / GGT: x           Urinalysis Basic - ( 06 Oct 2018 01:57 )    Color: Yellow / Appearance: very cloudy / S.015 / pH: x  Gluc: x / Ketone: Trace  / Bili: Negative / Urobili: Negative mg/dL   Blood: x / Protein: 500 mg/dL / Nitrite: Negative   Leuk Esterase: Moderate / RBC: 0-2 /HPF / WBC TNTC /HPF   Sq Epi: x / Non Sq Epi: Negative / Bacteria: Few    Culture - Urine (10.06.18 @ 01:57)    Specimen Source: .Urine None    Culture Results:   >100,000 CFU/ml Klebsiella pneumoniae      Culture - Urine (10.06.18 @ 01:57)    -  Amikacin: S <=8    -  Amoxicillin/Clavulanic Acid: S <=8/4    -  Ampicillin: R >16 These ampicillin results predict results for amoxicillin    -  Ampicillin/Sulbactam: R >16/8    -  Aztreonam: R >16    -  Cefazolin: R >16 For uncomplicated UTI with K. pneumoniae, E. coli, or P. mirablis: DAVIDSON <=16 is sensitive and DAVIDSON >=32 is resistant. This also predicts results for oral agents cefaclor, cefdinir, cefpodoxime, cefprozil, cefuroxime axetil, cephalexin and locarbef for uncomplicated UTI. Note that some isolates may be susceptible to these agents while testing resistant to cefazolin.    -  Cefepime: R >16    -  Cefoxitin: S <=4    -  Ceftriaxone: R >32 Enterobacter, Citrobacter, and Serratia may develop resistance during prolonged therapy    -  Ciprofloxacin: S 1    -  Ertapenem: S <=0.5    -  Gentamicin: R >8    -  Imipenem: S <=1    -  Levofloxacin: S <=1    -  Meropenem: S <=1    -  Nitrofurantoin: R >64 Should not be used to treat pyelonephritis    -  Piperacillin/Tazobactam: R <=8    -  Tigecycline: S <=1    -  Tobramycin: I 8    -  Trimethoprim/Sulfamethoxazole: R >2/38    Specimen Source: .Urine None    Culture Results:   >100,000 CFU/ml Klebsiella pneumoniae ESBL  Multiple Morphological Strains    Organism Identification: Klebsiella pneumoniae ESBL    Organism: Klebsiella pneumoniae ESBL    Method Type: DAVIDSON        RADIOLOGY & ADDITIONAL TESTS:    EXAM:  CT ABDOMEN AND PELVIS                        PROCEDURE DATE:  10/05/2018      FINDINGS:    Absence of intravenous contrast limits evaluation for focal lesions,   neoplasm, and vascular pathology.    Lower Thorax: No consolidation or effusion. Incompletely characterized   oblong right lower lobe opacity which nonemergent CT chest follow-up is   advised for better characterization.    Liver: Redemonstrated incompletely characterize right hepatic lobe   hypodensity during 3 x 1.6 cm on image 19 of series 2 for which   nonemergent liver protocol MR is advised provided no contraindications.  Biliary: No dilatation. Uncomplicated cholelithiasis.  Spleen: No suspicious lesions.      Pancreas: No inflammatory changes or ductal dilatation.      Adrenals: Normal.      Kidneys: No hydronephrosis. Stable scattered renal hypodense and   hyperdense lesions.  Vessels: Normal caliber. Atherosclerotic disease of the aorta and its   branches with associated diffuse dense vascular calcifications.   Infrarenal IVC filter noted. Vascular surgical clips are seen in the   right groin region. Right external iliac artery vascular stent identified.    GI tract: Gastric clips again noted at the fundus. Trace pericolonic   stranding at the level of proximal to mid descending colon. Clinical   correlation is advised to assess for mild colitis.No significant wall   thickening. Moderate fecal burden. No evidence of small bowel   obstruction. Normal-appearing appendix.    Peritoneum/retroperitoneum and mesentery: No free air. No organized fluid   collection. No adenopathy.        Pelvic organs/Bladder: Diffuse bladder wall thickening with trace   perivesicular stranding. Correlate with urinalysis to assess for cystitis   in appropriate clinical setting. Consider nonemergent retrograde   evaluation is concern for bladder malignancy. Heterogenous prostate   containing calcifications.    Abdominal wall: A small umbilical hernia containing portion of   obstructive bowelloop noted.  Bones and soft tissues: Multilevel degenerative changes of the spine   noted stable mild compression deformity of T12 vertebral body.   Degenerative changes of bilateral hip joints with nonspecific lucencies   in the right femoral head. Recommend clinical correlation to assess for   avascular necrosis.    IMPRESSION:    Trace pericolonic stranding at the level of proximal to mid descending   colon. Clinical correlation is advised to assess for mild colitis.No   significant wall thickening. Moderate fecal burden. No evidence of small   bowel obstruction or extraluminal collection.     Redemonstrated incompletely characterize right hepatic lobe hypodensity   during 3 x 1.6 cm for which nonemergent liver protocol MR is advised   provided no contraindications.    Diffuse bladder wall thickening with trace perivesicular stranding.   Correlate with urinalysis to assess for cystitis in appropriate clinical   setting. Consider nonemergent retrograde evaluation is concern for   bladder malignancy.    Uncomplicated cholelithiasis.

## 2018-10-12 NOTE — PROGRESS NOTE ADULT - SUBJECTIVE AND OBJECTIVE BOX
Patient is a 83y old  Male who presents with a chief complaint of Fever and palpitations (12 Oct 2018 16:18)      HPI:  84 y/o M PMHx significant for CAD s/p stents, AFib, diastolic CHF, hx of upper GI bleeding, PVD, hypertension, hypertension, severe COPD (O2 dependent), SHAYY on bipap at night, ESRD on HD (MWF), recurrent c. diff who was BIBA from dialysis ([facility]) where he was found to have a post-dialysis fever associated w/ tachycardia. In triage => /min, Tmax 102.7'F. Labs => LA 1.2, BUN/Cr 27/1.59, Alb 2.7, Phos 4.2. UA (+), RVP (-). CT ABD/Pelvis => Trace pericolonic stranding at the level of proximal to mid descending colon. Clinical correlation is advised to assess for mild colitis. No significant wall thickening. Moderate fecal burden. No evidence of small bowel obstruction or extraluminal collection. Re-demonstrated incompletely characterize right hepatic lobe hypodensity during 3 x 1.6 cm for which nonemergent liver protocol MR is advised provided no contraindications. Diffuse bladder wall thickening with trace perivesicular stranding. Correlate with urinalysis to assess for cystitis in appropriate clinical setting. Consider nonemergent retrograde evaluation is concern for bladder malignancy. Uncomplicated cholelithiasis. In the ED the patient was given [id number] IVPB x 1, [id number] IVPB x 1, NS 1.5L x 1. (06 Oct 2018 03:43)  Patient with positive fatigue. Reflux improved with keeping the head of bed elevated. Had some mild nausea last night but this has resolved.  Positive bowel movements but negative blood. Denies any chest pain or shortness of breath.        PAST MEDICAL & SURGICAL HISTORY:  Above knee amputation of right lower extremity  Recurrent Clostridium difficile diarrhea  Diastolic CHF  Peripheral vascular disease  Afib  Anemia  CKD (chronic kidney disease)  COPD (chronic obstructive pulmonary disease)  SHAYY (obstructive sleep apnea)  Sepsis, due to unspecified organism: 2/2 poorly healing wounds b/l  Dyspepsia: On moderate exertion.  Sleep apnea, obstructive: Requires home 02 therapy, and treatment with BIPAP  Atelectasis  Pleural effusion, bilateral  Respiratory failure  Peripheral edema  CRI (chronic renal insufficiency)  Gout  Benign prostatic hypertrophy  Spinal stenosis  Hypercholesterolemia  GERD (gastroesophageal reflux disease)  CAD (coronary artery disease)  Hypertension  S/P angioplasty with stent  Cataract of left eye  Prostate: Surgery green light procedure.  S/P rotator cuff surgery: Right  S/P angioplasty      MEDICATIONS  (STANDING):  allopurinol 100 milliGRAM(s) Oral daily  aspirin  chewable 81 milliGRAM(s) Oral daily  buDESOnide   0.5 milliGRAM(s) Respule 0.5 milliGRAM(s) Inhalation two times a day  diltiazem    Tablet 30 milliGRAM(s) Oral every 6 hours  doxazosin 8 milliGRAM(s) Oral at bedtime  epoetin nelly Injectable 6000 Unit(s) IV Push <User Schedule>  finasteride 5 milliGRAM(s) Oral daily  heparin  Injectable 5000 Unit(s) SubCutaneous every 8 hours  loratadine 10 milliGRAM(s) Oral daily  meropenem  IVPB 500 milliGRAM(s) IV Intermittent every 24 hours  pantoprazole    Tablet 40 milliGRAM(s) Oral before breakfast  simvastatin 10 milliGRAM(s) Oral at bedtime  vancomycin    Solution 125 milliGRAM(s) Oral every 6 hours    MEDICATIONS  (PRN):  acetaminophen   Tablet .. 650 milliGRAM(s) Oral every 6 hours PRN Temp greater or equal to 38C (100.4F), Mild Pain (1 - 3)  ondansetron Injectable 4 milliGRAM(s) IV Push every 6 hours PRN Nausea      Allergies    Zosyn (Rash)    Intolerances        SOCIAL HISTORY:NC    FAMILY HISTORY:  Family history of colorectal cancer (Father)  Family history of diabetes mellitus (Mother, Sibling)  Family history of hypertension (Mother)      REVIEW OF SYSTEMS:    CONSTITUTIONAL: No weakness, fevers or chills  EYES/ENT: No visual changes;  No vertigo or throat pain   NECK: No pain or stiffness  RESPIRATORY: No cough, wheezing, hemoptysis; No shortness of breath  CARDIOVASCULAR: No chest pain or palpitations  GENITOURINARY: No dysuria, frequency or hematuria  NEUROLOGICAL: No numbness or weakness  SKIN: No itching, burning, rashes, or lesions   All other review of systems is negative unless indicated above.    Vital Signs Last 24 Hrs  T(C): 37 (12 Oct 2018 17:27), Max: 37.1 (12 Oct 2018 11:58)  T(F): 98.6 (12 Oct 2018 17:27), Max: 98.8 (12 Oct 2018 11:58)  HR: 88 (12 Oct 2018 17:27) (69 - 88)  BP: 126/46 (12 Oct 2018 17:27) (109/50 - 172/53)  BP(mean): --  RR: 17 (12 Oct 2018 17:27) (16 - 18)  SpO2: 99% (12 Oct 2018 17:27) (94% - 99%)    PHYSICAL EXAM:    Constitutional: NAD, well-developed  HEENT: EOMI, throat clear  Neck: No LAD, supple  Respiratory: CTA and P  Cardiovascular: S1 and S2, RRR, no M  Gastrointestinal: BS+, soft, NT/ND, neg HSM,  Extremities: No peripheral edema, neg clubing, cyanosis  SP amp  Neurological: A/O x 3, no focal deficits  Psychiatric: Normal mood, normal affect  Skin: No rashes    LABS:  CBC Full  -  ( 12 Oct 2018 08:20 )  WBC Count : 4.70 K/uL  Hemoglobin : 9.1 g/dL  Hematocrit : 29.1 %  Platelet Count - Automated : 160 K/uL  Mean Cell Volume : 93.6 fl  Mean Cell Hemoglobin : 29.3 pg  Mean Cell Hemoglobin Concentration : 31.3 gm/dL  Auto Neutrophil # : x  Auto Lymphocyte # : x  Auto Monocyte # : x  Auto Eosinophil # : x  Auto Basophil # : x  Auto Neutrophil % : x  Auto Lymphocyte % : x  Auto Monocyte % : x  Auto Eosinophil % : x  Auto Basophil % : x    10-12    143  |  109<H>  |  51<H>  ----------------------------<  87  3.9   |  24  |  2.98<H>    Ca    7.7<L>      12 Oct 2018 08:20  Phos  6.8     10-12    TPro  x   /  Alb  2.4<L>  /  TBili  x   /  DBili  x   /  AST  x   /  ALT  x   /  AlkPhos  x   10-12    PT/INR - ( 12 Oct 2018 08:20 )   PT: 12.7 sec;   INR: 1.17 ratio             10-07 @ 11:19  Hep A Igm Nonreact  Hep A total ab, IgA and M --  Hep B core Ab total --  Hep B core IgM Nonreact  Hep B DNA PCR --  Hep BSAg --  Hep BSAb --  Hep BSAb --  HCV Ab --  HCV RNA Log --  HCV RNA interp --                RADIOLOGY & ADDITIONAL STUDIES:

## 2018-10-12 NOTE — PROGRESS NOTE ADULT - SUBJECTIVE AND OBJECTIVE BOX
Date of service: 10-12-18 @ 11:24    pt seen and examined  afebrile  home today  no complaints       ROS: no fever or chills; denies dizziness, no HA, no SOB or cough, no abdominal pain, no diarrhea or constipation;  no legs pain, no rashes      MEDICATIONS  (STANDING):  allopurinol 100 milliGRAM(s) Oral daily  aspirin  chewable 81 milliGRAM(s) Oral daily  buDESOnide   0.5 milliGRAM(s) Respule 0.5 milliGRAM(s) Inhalation two times a day  diltiazem    Tablet 30 milliGRAM(s) Oral every 6 hours  doxazosin 8 milliGRAM(s) Oral at bedtime  finasteride 5 milliGRAM(s) Oral daily  heparin  Injectable 5000 Unit(s) SubCutaneous every 8 hours  loratadine 10 milliGRAM(s) Oral daily  meropenem  IVPB 500 milliGRAM(s) IV Intermittent every 24 hours  pantoprazole    Tablet 40 milliGRAM(s) Oral before breakfast  simvastatin 10 milliGRAM(s) Oral at bedtime  vancomycin    Solution 125 milliGRAM(s) Oral every 6 hours      Vital Signs Last 24 Hrs  T(C): 36.6 (12 Oct 2018 08:15), Max: 37.2 (11 Oct 2018 17:11)  T(F): 97.8 (12 Oct 2018 08:15), Max: 99 (11 Oct 2018 17:11)  HR: 81 (12 Oct 2018 11:13) (69 - 83)  BP: 135/61 (12 Oct 2018 11:13) (109/50 - 172/53)  BP(mean): --  RR: 16 (12 Oct 2018 10:40) (16 - 20)  SpO2: 96% (12 Oct 2018 04:47) (95% - 98%)          PE:  Constitutional: frail looking  HEENT: NC/AT, EOMI, PERRLA, conjunctivae clear; ears and nose atraumatic; pharynx benign  Neck: supple; thyroid not palpable  Back: no tenderness  Respiratory: respiratory effort normal; clear to auscultation  Cardiovascular: S1S2 regular, no murmurs  Abdomen: soft,  tender on deep palpation, not distended, positive BS; liver and spleen WNL  Genitourinary: no suprapubic tenderness  Lymphatic: no LN palpable  Musculoskeletal: no muscle tenderness, no joint swelling or tenderness  Extremities: R ext s/p BKA, L foot in brace  Neurological/ Psychiatric: moving all extremities  Skin: no rashes; no palpable lesions    Labs: all available labs reviewed                                                9.1    4.70  )-----------( 160      ( 12 Oct 2018 08:20 )             29.1     10-12    143  |  109<H>  |  51<H>  ----------------------------<  87  3.9   |  24  |  2.98<H>    Ca    7.7<L>      12 Oct 2018 08:20  Phos  6.8     10-12    TPro  x   /  Alb  2.4<L>  /  TBili  x   /  DBili  x   /  AST  x   /  ALT  x   /  AlkPhos  x   10-12             Culture - Urine (10.06.18 @ 01:57)    -  Gentamicin: R >8    -  Imipenem: S <=1    -  Piperacillin/Tazobactam: R <=8    -  Tigecycline: S <=1    -  Tobramycin: I 8    -  Trimethoprim/Sulfamethoxazole: R >2/38    -  Amikacin: S <=8    -  Ampicillin: R >16 These ampicillin results predict results for amoxicillin    -  Ampicillin/Sulbactam: R >16/8    -  Aztreonam: R >16    -  Cefazolin: R >16 For uncomplicated UTI with K. pneumoniae, E. coli, or P. mirablis: DAVIDSON <=16 is sensitive and DAVIDSON >=32 is resistant. This also predicts results for oral agents cefaclor, cefdinir, cefpodoxime, cefprozil, cefuroxime axetil, cephalexin and locarbef for uncomplicated UTI. Note that some isolates may be susceptible to these agents while testing resistant to cefazolin.    -  Cefepime: R >16    -  Cefoxitin: S <=4    -  Ceftriaxone: R >32 Enterobacter, Citrobacter, and Serratia may develop resistance during prolonged therapy    -  Ciprofloxacin: S 1    -  Ertapenem: S <=0.5    -  Amoxicillin/Clavulanic Acid: S <=8/4    -  Levofloxacin: S <=1    -  Meropenem: S <=1    -  Nitrofurantoin: R >64 Should not be used to treat pyelonephritis    Specimen Source: .Urine None    Culture Results:   >100,000 CFU/ml Klebsiella pneumoniae ESBL  Multiple Morphological Strains    Organism Identification: Klebsiella pneumoniae ESBL    Organism: Klebsiella pneumoniae ESBL    Method Type: DAVIDSON      Radiology: all available radiological tests reviewed  < from: Xray Chest 1 View-PORTABLE IMMEDIATE (10.05.18 @ 21:16) >  EXAM:  XR CHEST PORTABLE IMMED 1V                            PROCEDURE DATE:  10/05/2018          INTERPRETATION:  History: Sepsis dyspnea    Chest:  one view.      Comparison: 8/1/2018    AP radiograph of the chest demonstrates no evidence of infiltrate,   pleural effusion or vascular congestion. No atelectasis is seen. RIGHT   central venous catheter tip in SVC. The cardiac silhouette is normal in   size. Vascular calcification involves the aorta. Osseous structures are   intact.    Impression: No active pulmonary disease.        < end of copied text >    < from: CT Abdomen and Pelvis No Cont (10.05.18 @ 21:36) >    EXAM:  CT ABDOMEN AND PELVIS                            PROCEDURE DATE:  10/05/2018          INTERPRETATION:  CT ABDOMEN AND PELVIS WITHOUT CONTRAST    INDICATION: Sepsis and abdominal distention.    TECHNIQUE: Abdominopelvic CT without intravenouscontrast.Images are   reformatted in the sagittal and coronal planes.    COMPARISON: CT abdomen pelvis 7/21/2018.    FINDINGS:    Absence of intravenous contrast limits evaluation for focal lesions,   neoplasm, and vascular pathology.    Lower Thorax: No consolidation or effusion. Incompletely characterized   oblong right lower lobe opacity which nonemergent CT chest follow-up is   advised for better characterization.    Liver: Redemonstrated incompletely characterize right hepatic lobe   hypodensity during 3 x 1.6 cm on image 19 of series 2 for which   nonemergent liver protocol MR is advised provided no contraindications.  Biliary: No dilatation. Uncomplicated cholelithiasis.  Spleen: No suspicious lesions.      Pancreas: No inflammatory changes or ductal dilatation.      Adrenals: Normal.      Kidneys: No hydronephrosis. Stable scattered renal hypodense and   hyperdense lesions.  Vessels: Normal caliber. Atherosclerotic disease of the aorta and its   branches with associated diffuse dense vascular calcifications.   Infrarenal IVC filter noted. Vascular surgical clips are seen in the   right groin region. Right external iliac artery vascular stent identified.    GI tract: Gastric clips again noted at the fundus. Trace pericolonic   stranding at the level of proximal to mid descending colon. Clinical   correlation is advised to assess for mild colitis.No significant wall   thickening. Moderate fecal burden. No evidence of small bowel   obstruction. Normal-appearing appendix.    Peritoneum/retroperitoneum and mesentery: No free air. No organized fluid   collection. No adenopathy.        Pelvic organs/Bladder: Diffuse bladder wall thickening with trace   perivesicular stranding. Correlate with urinalysis to assess for cystitis   in appropriate clinical setting. Consider nonemergent retrograde   evaluation is concern for bladder malignancy. Heterogenous prostate   containing calcifications.    Abdominal wall: A small umbilical hernia containing portion of   obstructive bowelloop noted.  Bones and soft tissues: Multilevel degenerative changes of the spine   noted stable mild compression deformity of T12 vertebral body.   Degenerative changes of bilateral hip joints with nonspecific lucencies   in the right femoral head. Recommend clinical correlation to assess for   avascular necrosis.    IMPRESSION:    Trace pericolonic stranding at the level of proximal to mid descending   colon. Clinical correlation is advised to assess for mild colitis.No   significant wall thickening. Moderate fecal burden. No evidence of small   bowel obstruction or extraluminal collection.     Redemonstrated incompletely characterize right hepatic lobe hypodensity   during 3 x 1.6 cm for which nonemergent liver protocol MR is advised   provided no contraindications.    Diffuse bladder wall thickening with trace perivesicular stranding.   Correlate with urinalysis to assess for cystitis in appropriate clinical   setting. Consider nonemergent retrograde evaluation is concern for   bladder malignancy.    Uncomplicated cholelithiasis.    Additional findings as detailed above.      Advanced directives addressed: full resuscitation

## 2018-10-12 NOTE — PROGRESS NOTE ADULT - ASSESSMENT
82 y/o M PMHx significant for CAD s/p stents, AFib, diastolic CHF, hx of upper GI bleeding, PVD, hypertension, hypertension, severe COPD (O2 dependent), SHAYY on bipap at night, ESRD on HD (MWF), recurrent c. diff who was BIBA from dialysis ([facility]) where he was found to have a post-dialysis fever associated w/ tachycardia. In triage => /min, Tmax 102.7'F.   Seen in ER with daughter at bedside  chart reviewed case d/w HD nurses  possible UTI  blood cx done    a/p  ESRD on HD   dialysis via cvc  possible UTI  evaluate for bacteremia as well  duplex of UE for avf    10/7   febrile syndrome  cystitis  on Cefepime IV  Vanco po  HD MWF  vein mapping UE  Dr Clement evaluating for possible avf    10/8 SY  --ARYA/CKD   For now HD dependent at [facility] tx schedule.  Will plan for HD in am.  Creat trending down.   Continue to monitor for renal recovery.  --UTI with resistant Klebsiella.  Started on Meropenem today.  --Plan for AVF once ID cleared and if pt does remain on hd this admission.    10/9  ESBL in urine switched to Meropenem   feels well  no new events  on isolation  4 k bath    10/10 MK  - arya/ckd for next hd on friday, remains hd dependent  - esbl uti: on meropenem  - avf after ID clearance, fu trend of the cr    10/11 SY  --ARYA/CKD  Creat is slowly trending down, however, remains oligoanuric with significant fluid weight gain intradialytic period.  plan to continue BIW HD for now.  Plan for AVF this admission if feasible.  --CHF : well compensated on HD.  Again pt may be dependent of HD to prevent CHF decompensation.  --ESBL UTI : Meropenem until tomorrow.  --Vascular eval for LLE wound and AVF.    10/12  Cystitis, Klebsiella pneumoniae, ESBL  On IV meropenem  Continue dialysis as outlined  For AV fistula placement

## 2018-10-12 NOTE — PROGRESS NOTE ADULT - ASSESSMENT
82 y/o M PMHx significant for CAD s/p stents, AFib, diastolic CHF, hx of upper GI bleeding, PVD, hypertension, hypertension, severe COPD (O2 dependent), SHAYY on bipap at night, ESRD on HD (MWF), recurrent c. diff who was BIBA from dialysis ([facility]) where he was found to have a post-dialysis fever associated w/ tachycardia. In triage => /min, Tmax 102.7'F. Labs => LA 1.2, BUN/Cr 27/1.59, Alb 2.7, Phos 4.2. UA (+), RVP (-). CT ABD/Pelvis => Trace pericolonic stranding at the level of proximal to mid descending colon. Clinical correlation is advised to assess for mild colitis.  Diffuse bladder wall thickening with trace perivesicular stranding. in the ED the patient was given [id number] IVPB x 1, Ipvpkgbkhr2b IVPB x 1, NS 1.5L x 1.     1. febrile syndrome/cystitis/hx of CDAD/ESRD  - improving   - s/p cefepime #2  - urine cx growing KLPN ESBL >100,000  - on meropenem 500mg daily #5/5  - plan for 5 days  - on oral vancomycin [id number] for c diff prophylaxis while remains on abx #7  - monitor temps  - tolerating abx well so far; no side effects noted  - reason for abx use and side effects reviewed with patient  - supportive care  - f/u cbc

## 2018-10-12 NOTE — PROGRESS NOTE ADULT - SUBJECTIVE AND OBJECTIVE BOX
NEPHROLOGY INTERVAL HPI/OVERNIGHT EVENTS:  10/11 SY  No acute events overnight.  Feeling fairly well.  Complains of pain in Left LE    10/12  Seen on dialysis  Continues to be on IV antibiotics for ESBL  Tolerating treatment well  On vancomycin orally for prophylaxis for C. difficile    HPI:  82 y/o M PMHx significant for CAD s/p stents, AFib, diastolic CHF, hx of upper GI bleeding, PVD, hypertension, hypertension, severe COPD (O2 dependent), SHAYY on bipap at night, ESRD on HD (MWF), recurrent c. diff who was BIBA from dialysis ([facility]) where he was found to have a post-dialysis fever associated w/ tachycardia. In triage => /min, Tmax 102.7'F. Labs => LA 1.2, BUN/Cr 27/1.59, Alb 2.7, Phos 4.2. UA (+), RVP (-). CT ABD/Pelvis => Trace pericolonic stranding at the level of proximal to mid descending colon. Clinical correlation is advised to assess for mild colitis. No significant wall thickening. Moderate fecal burden. No evidence of small bowel obstruction or extraluminal collection. Re-demonstrated incompletely characterize right hepatic lobe hypodensity during 3 x 1.6 cm for which nonemergent liver protocol MR is advised provided no contraindications. Diffuse bladder wall thickening with trace perivesicular stranding. Correlate with urinalysis to assess for cystitis in appropriate clinical setting. Consider nonemergent retrograde evaluation is concern for bladder malignancy. Uncomplicated cholelithiasis. In the ED the patient was given [id number] IVPB x 1, Snnhjdcgdi5y IVPB x 1, NS 1.5L x 1.   Seen in ER with daughter at bedside  chart reviewed case d/w HD nurses  possible UTI  blood cx done          MEDICATIONS  (STANDING):  allopurinol 100 milliGRAM(s) Oral daily  aspirin  chewable 81 milliGRAM(s) Oral daily  buDESOnide   0.5 milliGRAM(s) Respule 0.5 milliGRAM(s) Inhalation two times a day  diltiazem    Tablet 30 milliGRAM(s) Oral every 6 hours  doxazosin 8 milliGRAM(s) Oral at bedtime  finasteride 5 milliGRAM(s) Oral daily  heparin  Injectable 5000 Unit(s) SubCutaneous every 8 hours  loratadine 10 milliGRAM(s) Oral daily  meropenem  IVPB 500 milliGRAM(s) IV Intermittent every 24 hours  pantoprazole    Tablet 40 milliGRAM(s) Oral before breakfast  simvastatin 10 milliGRAM(s) Oral at bedtime  vancomycin    Solution 125 milliGRAM(s) Oral every 6 hours  warfarin 5 milliGRAM(s) Oral daily    MEDICATIONS  (PRN):  acetaminophen   Tablet .. 650 milliGRAM(s) Oral every 6 hours PRN Temp greater or equal to 38C (100.4F), Mild Pain (1 - 3)  ondansetron Injectable 4 milliGRAM(s) IV Push every 6 hours PRN Nausea          Vital Signs Last 24 Hrs  T(C): 36.9 (12 Oct 2018 13:07), Max: 37.2 (11 Oct 2018 17:11)  T(F): 98.4 (12 Oct 2018 13:07), Max: 99 (11 Oct 2018 17:11)  HR: 85 (12 Oct 2018 13:07) (69 - 85)  BP: 144/50 (12 Oct 2018 13:07) (109/50 - 172/53)  BP(mean): --  RR: 18 (12 Oct 2018 13:07) (16 - 19)  SpO2: 94% (12 Oct 2018 13:07) (94% - 98%)      PHYSICAL EXAM:  Alert and appropriate  GENERAL: No distress  CHEST/LUNG: Clear to aus  HEART: S1S2 RRR  ABDOMEN: soft  EXTREMITIES: decreased edema.  SKIN:     LABS:                          9.1    4.70  )-----------( 160      ( 12 Oct 2018 08:20 )             29.1       10-12    143  |  109<H>  |  51<H>  ----------------------------<  87  3.9   |  24  |  2.98<H>    Ca    7.7<L>      12 Oct 2018 08:20  Phos  6.8     10-12    TPro  x   /  Alb  2.4<L>  /  TBili  x   /  DBili  x   /  AST  x   /  ALT  x   /  AlkPhos  x   10-12

## 2018-10-12 NOTE — PROGRESS NOTE ADULT - ASSESSMENT
hypertension, hypertension, severe COPD (O2 dependent), SHAYY on bipap at night, ESRD on HD (MWF), recurrent c. diff who was [place] where he was found to have a post-dialysis fever associated w/ tachycardia. In triage => /min, Tmax 102.7'F. Labs => LA 1.2, BUN/Cr 27/1.59, Alb 2.7, Phos 4.2. UA (+), RVP (-). CT ABD/Pelvis => Trace pericolonic stranding at the level of proximal to mid descending colon. Clinical correlation is advised to assess for mild colitis. No significant wall thickening. Moderate fecal burden. No evidence of small bowel obstruction or extraluminal collection. Re-demonstrated incompletely characterize right hepatic lobe hypodensity during 3 x 1.6 cm for which nonemergent liver protocol MR is advised provided no contraindications. Diffuse bladder wall thickening with trace perivesicular stranding. Correlate with urinalysis to assess for cystitis in appropriate clinical setting. Consider nonemergent retrograde evaluation is concern for bladder malignancy. Uncomplicated cholelithiasis. In the ED the patient was given [id number] IVPB x 1, Kskcqvolhk3q IVPB x 1, NS 1.5L x 1.      Patient with likely cystitis/urinary tract infection as cause of his symptoms.  Antibiotics    No evidence of active C. difficile at this time although patient is at high risk of recurrent C. difficile. Would empirically treat with vancomycin 4 times a day for the duration that he is on antibiotics and for 2 weeks afterwards.    Coronary artery disease, atrial fibrillation–rate control. Continue aspirin.      Anticoagulation was held in the past secondary to significant episodes of GI bleeding.  however,   Discussed with hospitalist cardiologist patient and daughter regarding increased risk of bleeding and anticoagulation however, patient also had high risk for strokes.  After long discussion, family and patient agreed with anticoagulation and no evidence of bleeding so far  Would cont Protonix  Anticoagulation management per cardiologist.  Risk of bleeding versus risk of cardio embolic events reviewed with patient and daughter

## 2018-10-12 NOTE — PROGRESS NOTE ADULT - ASSESSMENT
82 y/o M PMHx significant for CAD s/p stents, AFib, diastolic CHF, hx of upper GI bleeding, PVD, hypertension, hypertension, severe COPD (O2 dependent), SHAYY on bipap at night, ESRD on HD (MWF), recurrent c. diff who was admitted for:     * Fevers due to Klebsiella  UTI/ cystitis ESBL, resolving   - fevers resolved   - leukocytosis better   - CT reviewed   - No diarrhea, off isolation   - F/u BCX: NGTD   - UCX: + Klebsiella   - C/w [id number] ->meropenem completed 5 days  - On PO Vanco for Cdiff PPxs       2)CAD/AFib  -  rate control   - c/w  Diltiazem 30mg po q6h  - cont. ASA 81mg po daily  - restarted on coumadin 5 mg, pt/inr daily, will hold 10/12 for now  for AVF as per vascular surgery  -cardiology consult - for risk stratification and choice of  AC   - d/w Dr. Carvalho - ok to restart AC with increased risk of bleeding     3)BPH  - c/w  Doxazosin 8mg po qHS  - c/w Dutasteride 0.5mg po qHS    4) COPD/SHAYY  - stable respiratory status   -CXR: neg   -cont. Budesonide nebs  -cont. supplemental O2 PRN   - BiPAP at night    5)ESRD  - C/w HD as per Renal     6) PAD, s/p R AKA, Chronic LLE ulcers   wound care eval  wound orders as per SX   Dr kailee toussaint - for AVF  evaluation     7) Allergic rhinitis  - restart claritin        6)Vte ppx

## 2018-10-13 LAB
ANION GAP SERPL CALC-SCNC: 11 MMOL/L — SIGNIFICANT CHANGE UP (ref 5–17)
BUN SERPL-MCNC: 29 MG/DL — HIGH (ref 7–23)
CALCIUM SERPL-MCNC: 8.2 MG/DL — LOW (ref 8.5–10.1)
CHLORIDE SERPL-SCNC: 105 MMOL/L — SIGNIFICANT CHANGE UP (ref 96–108)
CO2 SERPL-SCNC: 24 MMOL/L — SIGNIFICANT CHANGE UP (ref 22–31)
CREAT SERPL-MCNC: 2.67 MG/DL — HIGH (ref 0.5–1.3)
GLUCOSE SERPL-MCNC: 81 MG/DL — SIGNIFICANT CHANGE UP (ref 70–99)
HCT VFR BLD CALC: 30.9 % — LOW (ref 39–50)
HGB BLD-MCNC: 9.4 G/DL — LOW (ref 13–17)
INR BLD: 1.21 RATIO — HIGH (ref 0.88–1.16)
MCHC RBC-ENTMCNC: 29.1 PG — SIGNIFICANT CHANGE UP (ref 27–34)
MCHC RBC-ENTMCNC: 30.4 GM/DL — LOW (ref 32–36)
MCV RBC AUTO: 95.7 FL — SIGNIFICANT CHANGE UP (ref 80–100)
NRBC # BLD: 0 /100 WBCS — SIGNIFICANT CHANGE UP (ref 0–0)
PLATELET # BLD AUTO: 187 K/UL — SIGNIFICANT CHANGE UP (ref 150–400)
POTASSIUM SERPL-MCNC: 4 MMOL/L — SIGNIFICANT CHANGE UP (ref 3.5–5.3)
POTASSIUM SERPL-SCNC: 4 MMOL/L — SIGNIFICANT CHANGE UP (ref 3.5–5.3)
PROTHROM AB SERPL-ACNC: 13.1 SEC — HIGH (ref 9.8–12.7)
RBC # BLD: 3.23 M/UL — LOW (ref 4.2–5.8)
RBC # FLD: 15.4 % — HIGH (ref 10.3–14.5)
SODIUM SERPL-SCNC: 140 MMOL/L — SIGNIFICANT CHANGE UP (ref 135–145)
WBC # BLD: 4.94 K/UL — SIGNIFICANT CHANGE UP (ref 3.8–10.5)
WBC # FLD AUTO: 4.94 K/UL — SIGNIFICANT CHANGE UP (ref 3.8–10.5)

## 2018-10-13 PROCEDURE — 99024 POSTOP FOLLOW-UP VISIT: CPT

## 2018-10-13 RX ORDER — ASPIRIN/CALCIUM CARB/MAGNESIUM 324 MG
81 TABLET ORAL DAILY
Qty: 0 | Refills: 0 | Status: DISCONTINUED | OUTPATIENT
Start: 2018-10-13 | End: 2018-10-16

## 2018-10-13 RX ORDER — BUDESONIDE, MICRONIZED 100 %
0.5 POWDER (GRAM) MISCELLANEOUS
Qty: 0 | Refills: 0 | Status: DISCONTINUED | OUTPATIENT
Start: 2018-10-13 | End: 2018-10-16

## 2018-10-13 RX ORDER — VANCOMYCIN HCL 1 G
125 VIAL (EA) INTRAVENOUS EVERY 6 HOURS
Qty: 0 | Refills: 0 | Status: DISCONTINUED | OUTPATIENT
Start: 2018-10-13 | End: 2018-10-16

## 2018-10-13 RX ORDER — TRAMADOL HYDROCHLORIDE 50 MG/1
50 TABLET ORAL THREE TIMES A DAY
Qty: 0 | Refills: 0 | Status: DISCONTINUED | OUTPATIENT
Start: 2018-10-13 | End: 2018-10-13

## 2018-10-13 RX ORDER — MEROPENEM 1 G/30ML
500 INJECTION INTRAVENOUS EVERY 24 HOURS
Qty: 0 | Refills: 0 | Status: DISCONTINUED | OUTPATIENT
Start: 2018-10-13 | End: 2018-10-16

## 2018-10-13 RX ORDER — ALLOPURINOL 300 MG
100 TABLET ORAL DAILY
Qty: 0 | Refills: 0 | Status: DISCONTINUED | OUTPATIENT
Start: 2018-10-13 | End: 2018-10-16

## 2018-10-13 RX ORDER — ACETAMINOPHEN 500 MG
1000 TABLET ORAL ONCE
Qty: 0 | Refills: 0 | Status: COMPLETED | OUTPATIENT
Start: 2018-10-13 | End: 2018-10-13

## 2018-10-13 RX ORDER — SODIUM CHLORIDE 9 MG/ML
1000 INJECTION, SOLUTION INTRAVENOUS
Qty: 0 | Refills: 0 | Status: DISCONTINUED | OUTPATIENT
Start: 2018-10-13 | End: 2018-10-13

## 2018-10-13 RX ORDER — DOXAZOSIN MESYLATE 4 MG
8 TABLET ORAL AT BEDTIME
Qty: 0 | Refills: 0 | Status: DISCONTINUED | OUTPATIENT
Start: 2018-10-13 | End: 2018-10-16

## 2018-10-13 RX ORDER — ONDANSETRON 8 MG/1
4 TABLET, FILM COATED ORAL ONCE
Qty: 0 | Refills: 0 | Status: DISCONTINUED | OUTPATIENT
Start: 2018-10-13 | End: 2018-10-16

## 2018-10-13 RX ORDER — SIMVASTATIN 20 MG/1
10 TABLET, FILM COATED ORAL AT BEDTIME
Qty: 0 | Refills: 0 | Status: DISCONTINUED | OUTPATIENT
Start: 2018-10-13 | End: 2018-10-16

## 2018-10-13 RX ORDER — LORATADINE 10 MG/1
10 TABLET ORAL DAILY
Qty: 0 | Refills: 0 | Status: DISCONTINUED | OUTPATIENT
Start: 2018-10-13 | End: 2018-10-16

## 2018-10-13 RX ORDER — ONDANSETRON 8 MG/1
4 TABLET, FILM COATED ORAL EVERY 6 HOURS
Qty: 0 | Refills: 0 | Status: DISCONTINUED | OUTPATIENT
Start: 2018-10-13 | End: 2018-10-16

## 2018-10-13 RX ORDER — ERYTHROPOIETIN 10000 [IU]/ML
6000 INJECTION, SOLUTION INTRAVENOUS; SUBCUTANEOUS
Qty: 0 | Refills: 0 | Status: DISCONTINUED | OUTPATIENT
Start: 2018-10-13 | End: 2018-10-16

## 2018-10-13 RX ORDER — PANTOPRAZOLE SODIUM 20 MG/1
40 TABLET, DELAYED RELEASE ORAL
Qty: 0 | Refills: 0 | Status: DISCONTINUED | OUTPATIENT
Start: 2018-10-13 | End: 2018-10-16

## 2018-10-13 RX ORDER — FINASTERIDE 5 MG/1
5 TABLET, FILM COATED ORAL DAILY
Qty: 0 | Refills: 0 | Status: DISCONTINUED | OUTPATIENT
Start: 2018-10-13 | End: 2018-10-16

## 2018-10-13 RX ORDER — TRAMADOL HYDROCHLORIDE 50 MG/1
50 TABLET ORAL EVERY 6 HOURS
Qty: 0 | Refills: 0 | Status: DISCONTINUED | OUTPATIENT
Start: 2018-10-13 | End: 2018-10-16

## 2018-10-13 RX ORDER — FENTANYL CITRATE 50 UG/ML
25 INJECTION INTRAVENOUS
Qty: 0 | Refills: 0 | Status: DISCONTINUED | OUTPATIENT
Start: 2018-10-13 | End: 2018-10-16

## 2018-10-13 RX ORDER — ACETAMINOPHEN 500 MG
650 TABLET ORAL EVERY 6 HOURS
Qty: 0 | Refills: 0 | Status: DISCONTINUED | OUTPATIENT
Start: 2018-10-13 | End: 2018-10-16

## 2018-10-13 RX ORDER — OXYCODONE HYDROCHLORIDE 5 MG/1
5 TABLET ORAL ONCE
Qty: 0 | Refills: 0 | Status: DISCONTINUED | OUTPATIENT
Start: 2018-10-13 | End: 2018-10-16

## 2018-10-13 RX ORDER — HEPARIN SODIUM 5000 [USP'U]/ML
5000 INJECTION INTRAVENOUS; SUBCUTANEOUS EVERY 8 HOURS
Qty: 0 | Refills: 0 | Status: DISCONTINUED | OUTPATIENT
Start: 2018-10-13 | End: 2018-10-16

## 2018-10-13 RX ADMIN — MEROPENEM 100 MILLIGRAM(S): 1 INJECTION INTRAVENOUS at 13:25

## 2018-10-13 RX ADMIN — HEPARIN SODIUM 5000 UNIT(S): 5000 INJECTION INTRAVENOUS; SUBCUTANEOUS at 13:25

## 2018-10-13 RX ADMIN — Medication 125 MILLIGRAM(S): at 05:15

## 2018-10-13 RX ADMIN — PANTOPRAZOLE SODIUM 40 MILLIGRAM(S): 20 TABLET, DELAYED RELEASE ORAL at 13:28

## 2018-10-13 RX ADMIN — Medication 8 MILLIGRAM(S): at 20:59

## 2018-10-13 RX ADMIN — Medication 0.5 MILLIGRAM(S): at 21:35

## 2018-10-13 RX ADMIN — Medication 100 MILLIGRAM(S): at 13:25

## 2018-10-13 RX ADMIN — Medication 125 MILLIGRAM(S): at 17:23

## 2018-10-13 RX ADMIN — LORATADINE 10 MILLIGRAM(S): 10 TABLET ORAL at 13:25

## 2018-10-13 RX ADMIN — Medication 125 MILLIGRAM(S): at 13:28

## 2018-10-13 RX ADMIN — TRAMADOL HYDROCHLORIDE 50 MILLIGRAM(S): 50 TABLET ORAL at 20:59

## 2018-10-13 RX ADMIN — Medication 81 MILLIGRAM(S): at 13:25

## 2018-10-13 RX ADMIN — TRAMADOL HYDROCHLORIDE 50 MILLIGRAM(S): 50 TABLET ORAL at 13:28

## 2018-10-13 RX ADMIN — Medication 650 MILLIGRAM(S): at 11:24

## 2018-10-13 RX ADMIN — SIMVASTATIN 10 MILLIGRAM(S): 20 TABLET, FILM COATED ORAL at 20:59

## 2018-10-13 RX ADMIN — Medication 125 MILLIGRAM(S): at 22:56

## 2018-10-13 RX ADMIN — FINASTERIDE 5 MILLIGRAM(S): 5 TABLET, FILM COATED ORAL at 13:25

## 2018-10-13 NOTE — BRIEF OPERATIVE NOTE - PROCEDURE
<<-----Click on this checkbox to enter Procedure AV fistula  10/13/2018  R brachiocephalic AVF  Active  MANFRED

## 2018-10-13 NOTE — PROGRESS NOTE ADULT - ASSESSMENT
84 y/o M PMHx significant for CAD s/p stents, AFib, diastolic CHF, hx of upper GI bleeding, PVD, hypertension, hypertension, severe COPD (O2 dependent), SHAYY on bipap at night, ESRD on HD (MWF), recurrent c. diff who was admitted for:     * Fevers due to Klebsiella  UTI/ cystitis ESBL, resolving   - fevers resolved   - leukocytosis better   - CT reviewed   - No diarrhea, off isolation   - F/u BCX: NGTD   - UCX: + Klebsiella   - C/w [id number] ->meropenem completed 5 days  - On PO Vanco for Cdiff PPxs     2)CAD/AFib  -  rate control   - c/w  Diltiazem 30mg po q6h  - cont. ASA 81mg po daily  - restarted on coumadin 5 mg, pt/inr daily, will hold 10/12 for now  for AVF as per vascular surgery  -cardiology consult - for risk stratification and choice of  AC   - d/w Dr. Carvalho - ok to restart AC with increased risk of bleeding     3)BPH  - c/w  Doxazosin 8mg po qHS  - c/w Dutasteride 0.5mg po qHS    4) COPD/SHAYY  - stable respiratory status   -CXR: neg   -cont. Budesonide nebs  -cont. supplemental O2 PRN   - BiPAP at night    5)ESRD  - C/w HD as per Renal   10/13 - s/p  right brachiocephalic AVF by Dr. Clement  - pain management       6) PAD, s/p R AKA, Chronic LLE ulcers   wound care eval  wound orders as per SX   vascular surgery consult    7) Allergic rhinitis  - restart claritin        6)Vte ppx

## 2018-10-13 NOTE — PROGRESS NOTE ADULT - SUBJECTIVE AND OBJECTIVE BOX
CC: Fever and palpitations     HPI:     82 y/o M PMHx significant for CAD s/p stents, AFib, diastolic CHF, hx of upper GI bleeding, PVD, hypertension, hypertension, severe COPD (O2 dependent), SHAYY on bipap at night, ESRD on HD (MWF), recurrent c. diff who was BIBA on 10/5/18  from dialysis ([facility]) where he was found to have a post-dialysis fever associated w/ tachycardia. In triage => /min, Tmax 102.7'F. Labs => LA 1.2, BUN/Cr 27/1.59, Alb 2.7, Phos 4.2. UA (+), RVP (-). CT ABD/Pelvis => Trace pericolonic stranding at the level of proximal to mid descending colon. Clinical correlation is advised to assess for mild colitis. No significant wall thickening. Moderate fecal burden. No evidence of small bowel obstruction or extraluminal collection. Re-demonstrated incompletely characterize right hepatic lobe hypodensity during 3 x 1.6 cm for which nonemergent liver protocol MR is advised provided no contraindications. Diffuse bladder wall thickening with trace perivesicular stranding. Correlate with urinalysis to assess for cystitis in appropriate clinical setting. Consider nonemergent retrograde evaluation is concern for bladder malignancy. Uncomplicated cholelithiasis. In the ED the patient was given [id number] IVPB x 1, [id number] IVPB x 1, NS 1.5L x 1.         INTERVAL HPI/ OVERNIGHT EVENTS:    10/7/18 Pt was seen and examined,  reports feeling well no complains. No fevers, no CP or SOB   10/8 /18 pt seen and examined, denies fever, chills, abdominal pain, tolerating PO diet, denies new sx  10/9/18 - pt seen and examined at HD, denies CP, dyspnea, weakness, afebrile  10/10/18 - pt seen and examined, denies fever, chills, + nasal congestion, denies new sx  10/11/18 - pt seen and examined , reports left leg pain, dressing removed feels better , denies fever, chills  10/12/18 - pt seen and examined, denies left leg pain, afebrile, tolerated HD well  10/13 - s/p  right brachiocephalic AVF , tolerated procedure well, pain 6/10, denies new sx, afebrile  REVIEW OF SYSTEMS:  All other review of systems is negative unless indicated above.    T(C): 36.6 (10-13-18 @ 11:09), Max: 37.2 (10-12-18 @ 23:00)  T(F): 97.8 (10-13-18 @ 11:09), Max: 99 (10-12-18 @ 23:00)  HR: 77 (10-13-18 @ 11:09) (71 - 88)  BP: 133/48 (10-13-18 @ 11:09) (107/39 - 136/47)  RR: 18 (10-13-18 @ 11:09) (14 - 18)  SpO2: 96% (10-13-18 @ 11:09) (96% - 100%)  Wt(kg): --  PHYSICAL EXAM:  General: Well developed; well nourished; in no acute distress  Eyes: PERRLA, EOMI; conjunctiva and sclera clear  Head: Normocephalic; atraumatic  ENMT: No nasal discharge; airway clear  Neck: Supple; non tender; no masses  Respiratory: Good air entry  No wheezes, rales or rhonchi  Cardiovascular: Regular rate and rhythm. S1 and S2 Normal; No murmurs  Gastrointestinal: Soft non-tender non-distended; Normal bowel sounds  Genitourinary: No costovertebral angle tenderness  Extremities: No edema, S/p R AKA  Vascular: Peripheral pulses diminished, LLE  Neurological: Alert and oriented x4  Skin: Warm and dry. No acute rash.  LLE with dressing and in a boot   Lymph Nodes: No acute cervical adenopathy  Musculoskeletal: Normal muscle tone, without deformities  Psychiatric: Cooperative and appropriate        LABS:  10-13    140  |  105  |  29<H>  ----------------------------<  81  4.0   |  24  |  2.67<H>    Ca    8.2<L>      13 Oct 2018 07:14  Phos  6.8     10-12    TPro  x   /  Alb  2.4<L>  /  TBili  x   /  DBili  x   /  AST  x   /  ALT  x   /  AlkPhos  x   10-12                         9.4    4.94  )-----------( 187      ( 13 Oct 2018 07:14 )             30.9       LIVER FUNCTIONS - ( 12 Oct 2018 08:20 )  Alb: 2.4 g/dL / Pro: x     / ALK PHOS: x     / ALT: x     / AST: x     / GGT: x           PT/INR - ( 13 Oct 2018 07:14 )   PT: 13.1 sec;   INR: 1.21 ratio        10-12    143  |  109<H>  |  51<H>  ----------------------------<  87  3.9   |  24  |  2.98<H>    Ca    7.7<L>      12 Oct 2018 08:20  Phos  6.8     10-12    TPro  x   /  Alb  2.4<L>  /  TBili  x   /  DBili  x   /  AST  x   /  ALT  x   /  AlkPhos  x   10-12                       9.1    4.70  )-----------( 160      ( 12 Oct 2018 08:20 )             29.1   LIVER FUNCTIONS - ( 12 Oct 2018 08:20 )  Alb: 2.4 g/dL / Pro: x     / ALK PHOS: x     / ALT: x     / AST: x     / GGT: x             PT/INR - ( 12 Oct 2018 08:20 )   PT: 12.7 sec;   INR: 1.17 ratio         10-11    140  |  106  |  41<H>  ----------------------------<  79  3.7   |  22  |  2.64<H>    Ca    7.7<L>      11 Oct 2018 07:04                         9.2    4.57  )-----------( 146      ( 11 Oct 2018 07:04 )             29.5     PT/INR - ( 11 Oct 2018 07:04 )   PT: 11.7 sec;   INR: 1.08 ratio        10-09    149<H>  |  114<H>  |  56<H>  ----------------------------<  110<H>  3.5   |  21<L>  |  2.59<H>    Ca    7.7<L>      09 Oct 2018 08:25  Phos  4.6     10-09    TPro  x   /  Alb  1.6<L>  /  TBili  x   /  DBili  x   /  AST  x   /  ALT  x   /  AlkPhos  x   1009                      8.9    3.58  )-----------( 141      ( 09 Oct 2018 08:25 )             28.8     LIVER FUNCTIONS - ( 09 Oct 2018 08:25 )  Alb: 1.6 g/dL / Pro: x     / ALK PHOS: x     / ALT: x     / AST: x     / GGT: x           10-08    143  |  110<H>  |  58<H>  ----------------------------<  127<H>  3.9   |  23  |  2.84<H>    Ca    8.5      08 Oct 2018 11:25                          9.7    3.85  )-----------( 148      ( 08 Oct 2018 11:25 )             31.8                         9.9    4.93  )-----------( 142      ( 07 Oct 2018 11:18 )             32.4     10-07    144  |  112<H>  |  52<H>  ----------------------------<  131<H>  3.7   |  23  |  2.87<H>    Ca    8.5      07 Oct 2018 11:18  Phos  4.5     10-06  Mg     1.7     10-06    TPro  5.8<L>  /  Alb  2.4<L>  /  TBili  0.4  /  DBili  x   /  AST  24  /  ALT  31  /  AlkPhos  63  10-06                       10.4   4.83  )-----------( 143      ( 05 Oct 2018 20:40 )             33.1     05 Oct 2018 20:40    139    |  106    |  27     ----------------------------<  105    3.5     |  23     |  1.59     Ca    8.2        05 Oct 2018 20:40    TPro  6.5    /  Alb  2.7    /  TBili  0.3    /  DBili  x      /  AST  28     /  ALT  36     /  AlkPhos  74     05 Oct 2018 20:40    PT/INR - ( 05 Oct 2018 20:40 )   PT: 12.3 sec;   INR: 1.14 ratio         PTT - ( 05 Oct 2018 20:40 )  PTT:59.5 sec  CAPILLARY BLOOD GLUCOSE        LIVER FUNCTIONS - ( 05 Oct 2018 20:40 )  Alb: 2.7 g/dL / Pro: 6.5 gm/dL / ALK PHOS: 74 U/L / ALT: 36 U/L / AST: 28 U/L / GGT: x           Urinalysis Basic - ( 06 Oct 2018 01:57 )    Color: Yellow / Appearance: very cloudy / S.015 / pH: x  Gluc: x / Ketone: Trace  / Bili: Negative / Urobili: Negative mg/dL   Blood: x / Protein: 500 mg/dL / Nitrite: Negative   Leuk Esterase: Moderate / RBC: 0-2 /HPF / WBC TNTC /HPF   Sq Epi: x / Non Sq Epi: Negative / Bacteria: Few    Culture - Urine (10.06.18 @ 01:57)    Specimen Source: .Urine None    Culture Results:   >100,000 CFU/ml Klebsiella pneumoniae      Culture - Urine (10.06.18 @ 01:57)    -  Amikacin: S <=8    -  Amoxicillin/Clavulanic Acid: S <=8/4    -  Ampicillin: R >16 These ampicillin results predict results for amoxicillin    -  Ampicillin/Sulbactam: R >16/8    -  Aztreonam: R >16    -  Cefazolin: R >16 For uncomplicated UTI with K. pneumoniae, E. coli, or P. mirablis: DAVIDSON <=16 is sensitive and DAVIDSON >=32 is resistant. This also predicts results for oral agents cefaclor, cefdinir, cefpodoxime, cefprozil, cefuroxime axetil, cephalexin and locarbef for uncomplicated UTI. Note that some isolates may be susceptible to these agents while testing resistant to cefazolin.    -  Cefepime: R >16    -  Cefoxitin: S <=4    -  Ceftriaxone: R >32 Enterobacter, Citrobacter, and Serratia may develop resistance during prolonged therapy    -  Ciprofloxacin: S 1    -  Ertapenem: S <=0.5    -  Gentamicin: R >8    -  Imipenem: S <=1    -  Levofloxacin: S <=1    -  Meropenem: S <=1    -  Nitrofurantoin: R >64 Should not be used to treat pyelonephritis    -  Piperacillin/Tazobactam: R <=8    -  Tigecycline: S <=1    -  Tobramycin: I 8    -  Trimethoprim/Sulfamethoxazole: R >    Specimen Source: .Urine None    Culture Results:   >100,000 CFU/ml Klebsiella pneumoniae ESBL  Multiple Morphological Strains    Organism Identification: Klebsiella pneumoniae ESBL    Organism: Klebsiella pneumoniae ESBL    Method Type: DAVIDSON        RADIOLOGY & ADDITIONAL TESTS:    EXAM:  CT ABDOMEN AND PELVIS                        PROCEDURE DATE:  10/05/2018      FINDINGS:    Absence of intravenous contrast limits evaluation for focal lesions,   neoplasm, and vascular pathology.    Lower Thorax: No consolidation or effusion. Incompletely characterized   oblong right lower lobe opacity which nonemergent CT chest follow-up is   advised for better characterization.    Liver: Redemonstrated incompletely characterize right hepatic lobe   hypodensity during 3 x 1.6 cm on image 19 of series 2 for which   nonemergent liver protocol MR is advised provided no contraindications.  Biliary: No dilatation. Uncomplicated cholelithiasis.  Spleen: No suspicious lesions.      Pancreas: No inflammatory changes or ductal dilatation.      Adrenals: Normal.      Kidneys: No hydronephrosis. Stable scattered renal hypodense and   hyperdense lesions.  Vessels: Normal caliber. Atherosclerotic disease of the aorta and its   branches with associated diffuse dense vascular calcifications.   Infrarenal IVC filter noted. Vascular surgical clips are seen in the   right groin region. Right external iliac artery vascular stent identified.    GI tract: Gastric clips again noted at the fundus. Trace pericolonic   stranding at the level of proximal to mid descending colon. Clinical   correlation is advised to assess for mild colitis.No significant wall   thickening. Moderate fecal burden. No evidence of small bowel   obstruction. Normal-appearing appendix.    Peritoneum/retroperitoneum and mesentery: No free air. No organized fluid   collection. No adenopathy.        Pelvic organs/Bladder: Diffuse bladder wall thickening with trace   perivesicular stranding. Correlate with urinalysis to assess for cystitis   in appropriate clinical setting. Consider nonemergent retrograde   evaluation is concern for bladder malignancy. Heterogenous prostate   containing calcifications.    Abdominal wall: A small umbilical hernia containing portion of   obstructive bowelloop noted.  Bones and soft tissues: Multilevel degenerative changes of the spine   noted stable mild compression deformity of T12 vertebral body.   Degenerative changes of bilateral hip joints with nonspecific lucencies   in the right femoral head. Recommend clinical correlation to assess for   avascular necrosis.    IMPRESSION:    Trace pericolonic stranding at the level of proximal to mid descending   colon. Clinical correlation is advised to assess for mild colitis.No   significant wall thickening. Moderate fecal burden. No evidence of small   bowel obstruction or extraluminal collection.     Redemonstrated incompletely characterize right hepatic lobe hypodensity   during 3 x 1.6 cm for which nonemergent liver protocol MR is advised   provided no contraindications.    Diffuse bladder wall thickening with trace perivesicular stranding.   Correlate with urinalysis to assess for cystitis in appropriate clinical   setting. Consider nonemergent retrograde evaluation is concern for   bladder malignancy.    Uncomplicated cholelithiasis.

## 2018-10-13 NOTE — PROGRESS NOTE ADULT - SUBJECTIVE AND OBJECTIVE BOX
Asked by RN to evaluate Pt RUE dressing which was noted to be saturated with blood and ? swelling. Pt is Post-Op Rt UE AV fistula this AM. Mild pain to Rt UE/unchanged. Denies weakness, numbness, tingling, feels well.     RUE:   Sm Telfa dressing  was noted to be saturated.   + Thrill, min echymosis , soft with no swelling/ hematoma appreciated. took down dressing, no bleeding  noted . New sterile telfa/tegaderm dressing placed as previous.   NVI, DANNIELLE, warm     A/P:  S/P Rt UE AV fistula  dressing saturated/changed  No Bleeding/NVI  will continue to monitor

## 2018-10-14 LAB
ANION GAP SERPL CALC-SCNC: 9 MMOL/L — SIGNIFICANT CHANGE UP (ref 5–17)
BASOPHILS # BLD AUTO: 0.04 K/UL — SIGNIFICANT CHANGE UP (ref 0–0.2)
BASOPHILS NFR BLD AUTO: 0.7 % — SIGNIFICANT CHANGE UP (ref 0–2)
BUN SERPL-MCNC: 43 MG/DL — HIGH (ref 7–23)
CALCIUM SERPL-MCNC: 8.4 MG/DL — LOW (ref 8.5–10.1)
CHLORIDE SERPL-SCNC: 110 MMOL/L — HIGH (ref 96–108)
CO2 SERPL-SCNC: 25 MMOL/L — SIGNIFICANT CHANGE UP (ref 22–31)
CREAT SERPL-MCNC: 3.09 MG/DL — HIGH (ref 0.5–1.3)
EOSINOPHIL # BLD AUTO: 0.32 K/UL — SIGNIFICANT CHANGE UP (ref 0–0.5)
EOSINOPHIL NFR BLD AUTO: 5.8 % — SIGNIFICANT CHANGE UP (ref 0–6)
GLUCOSE SERPL-MCNC: 81 MG/DL — SIGNIFICANT CHANGE UP (ref 70–99)
HCT VFR BLD CALC: 29.8 % — LOW (ref 39–50)
HGB BLD-MCNC: 9 G/DL — LOW (ref 13–17)
IMM GRANULOCYTES NFR BLD AUTO: 0.7 % — SIGNIFICANT CHANGE UP (ref 0–1.5)
INR BLD: 1.22 RATIO — HIGH (ref 0.88–1.16)
LYMPHOCYTES # BLD AUTO: 0.72 K/UL — LOW (ref 1–3.3)
LYMPHOCYTES # BLD AUTO: 13.1 % — SIGNIFICANT CHANGE UP (ref 13–44)
MCHC RBC-ENTMCNC: 28.8 PG — SIGNIFICANT CHANGE UP (ref 27–34)
MCHC RBC-ENTMCNC: 30.2 GM/DL — LOW (ref 32–36)
MCV RBC AUTO: 95.2 FL — SIGNIFICANT CHANGE UP (ref 80–100)
MONOCYTES # BLD AUTO: 0.62 K/UL — SIGNIFICANT CHANGE UP (ref 0–0.9)
MONOCYTES NFR BLD AUTO: 11.3 % — SIGNIFICANT CHANGE UP (ref 2–14)
NEUTROPHILS # BLD AUTO: 3.74 K/UL — SIGNIFICANT CHANGE UP (ref 1.8–7.4)
NEUTROPHILS NFR BLD AUTO: 68.4 % — SIGNIFICANT CHANGE UP (ref 43–77)
NRBC # BLD: 0 /100 WBCS — SIGNIFICANT CHANGE UP (ref 0–0)
PLATELET # BLD AUTO: 185 K/UL — SIGNIFICANT CHANGE UP (ref 150–400)
POTASSIUM SERPL-MCNC: 4 MMOL/L — SIGNIFICANT CHANGE UP (ref 3.5–5.3)
POTASSIUM SERPL-SCNC: 4 MMOL/L — SIGNIFICANT CHANGE UP (ref 3.5–5.3)
PROTHROM AB SERPL-ACNC: 13.2 SEC — HIGH (ref 9.8–12.7)
RBC # BLD: 3.13 M/UL — LOW (ref 4.2–5.8)
RBC # FLD: 15.7 % — HIGH (ref 10.3–14.5)
SODIUM SERPL-SCNC: 144 MMOL/L — SIGNIFICANT CHANGE UP (ref 135–145)
WBC # BLD: 5.48 K/UL — SIGNIFICANT CHANGE UP (ref 3.8–10.5)
WBC # FLD AUTO: 5.48 K/UL — SIGNIFICANT CHANGE UP (ref 3.8–10.5)

## 2018-10-14 RX ORDER — WARFARIN SODIUM 2.5 MG/1
3 TABLET ORAL DAILY
Qty: 0 | Refills: 0 | Status: DISCONTINUED | OUTPATIENT
Start: 2018-10-14 | End: 2018-10-16

## 2018-10-14 RX ADMIN — HEPARIN SODIUM 5000 UNIT(S): 5000 INJECTION INTRAVENOUS; SUBCUTANEOUS at 21:43

## 2018-10-14 RX ADMIN — Medication 100 MILLIGRAM(S): at 13:09

## 2018-10-14 RX ADMIN — Medication 125 MILLIGRAM(S): at 17:20

## 2018-10-14 RX ADMIN — Medication 125 MILLIGRAM(S): at 23:37

## 2018-10-14 RX ADMIN — MEROPENEM 100 MILLIGRAM(S): 1 INJECTION INTRAVENOUS at 13:10

## 2018-10-14 RX ADMIN — Medication 81 MILLIGRAM(S): at 13:09

## 2018-10-14 RX ADMIN — HEPARIN SODIUM 5000 UNIT(S): 5000 INJECTION INTRAVENOUS; SUBCUTANEOUS at 06:45

## 2018-10-14 RX ADMIN — LORATADINE 10 MILLIGRAM(S): 10 TABLET ORAL at 13:09

## 2018-10-14 RX ADMIN — FINASTERIDE 5 MILLIGRAM(S): 5 TABLET, FILM COATED ORAL at 13:10

## 2018-10-14 RX ADMIN — PANTOPRAZOLE SODIUM 40 MILLIGRAM(S): 20 TABLET, DELAYED RELEASE ORAL at 05:45

## 2018-10-14 RX ADMIN — Medication 125 MILLIGRAM(S): at 13:09

## 2018-10-14 RX ADMIN — Medication 125 MILLIGRAM(S): at 05:45

## 2018-10-14 RX ADMIN — Medication 8 MILLIGRAM(S): at 21:42

## 2018-10-14 RX ADMIN — HEPARIN SODIUM 5000 UNIT(S): 5000 INJECTION INTRAVENOUS; SUBCUTANEOUS at 13:09

## 2018-10-14 RX ADMIN — Medication 0.5 MILLIGRAM(S): at 20:19

## 2018-10-14 RX ADMIN — SIMVASTATIN 10 MILLIGRAM(S): 20 TABLET, FILM COATED ORAL at 21:42

## 2018-10-14 RX ADMIN — WARFARIN SODIUM 3 MILLIGRAM(S): 2.5 TABLET ORAL at 21:43

## 2018-10-14 NOTE — PROGRESS NOTE ADULT - SUBJECTIVE AND OBJECTIVE BOX
CC: Fever and palpitations     HPI:     82 y/o M PMHx significant for CAD s/p stents, AFib, diastolic CHF, hx of upper GI bleeding, PVD, hypertension, hypertension, severe COPD (O2 dependent), SHAYY on bipap at night, ESRD on HD (MWF), recurrent c. diff who was BIBA on 10/5/18  from dialysis ([facility]) where he was found to have a post-dialysis fever associated w/ tachycardia. In triage => /min, Tmax 102.7'F. Labs => LA 1.2, BUN/Cr 27/1.59, Alb 2.7, Phos 4.2. UA (+), RVP (-). CT ABD/Pelvis => Trace pericolonic stranding at the level of proximal to mid descending colon. Clinical correlation is advised to assess for mild colitis. No significant wall thickening. Moderate fecal burden. No evidence of small bowel obstruction or extraluminal collection. Re-demonstrated incompletely characterize right hepatic lobe hypodensity during 3 x 1.6 cm for which nonemergent liver protocol MR is advised provided no contraindications. Diffuse bladder wall thickening with trace perivesicular stranding. Correlate with urinalysis to assess for cystitis in appropriate clinical setting. Consider nonemergent retrograde evaluation is concern for bladder malignancy. Uncomplicated cholelithiasis. In the ED the patient was given [id number] IVPB x 1, [id number] IVPB x 1, NS 1.5L x 1.         INTERVAL HPI/ OVERNIGHT EVENTS:    10/7/18 Pt was seen and examined,  reports feeling well no complains. No fevers, no CP or SOB   10/8 /18 pt seen and examined, denies fever, chills, abdominal pain, tolerating PO diet, denies new sx  10/9/18 - pt seen and examined at HD, denies CP, dyspnea, weakness, afebrile  10/10/18 - pt seen and examined, denies fever, chills, + nasal congestion, denies new sx  10/11/18 - pt seen and examined , reports left leg pain, dressing removed feels better , denies fever, chills  10/12/18 - pt seen and examined, denies left leg pain, afebrile, tolerated HD well  10/13 - s/p  right brachiocephalic AVF , tolerated procedure well, pain 6/10, denies new sx, afebrile  10/14 - pt seen and examined, right arm pain better, denies Cp, dyspnea, palpitations, afebrile, no new sx  REVIEW OF SYSTEMS:  All other review of systems is negative unless indicated above.    T(C): 36.9 (10-14-18 @ 12:06), Max: 37.1 (10-14-18 @ 05:45)  T(F): 98.4 (10-14-18 @ 12:06), Max: 98.7 (10-14-18 @ 05:45)  HR: 84 (10-14-18 @ 12:06) (72 - 84)  BP: 132/39 (10-14-18 @ 12:06) (132/39 - 146/45)  RR: 18 (10-14-18 @ 12:06) (18 - 18)  SpO2: 96% (10-14-18 @ 12:06) (96% - 98%)  Wt(kg): --  PHYSICAL EXAM:  General: Well developed; well nourished; in no acute distress  Eyes: PERRLA, EOMI; conjunctiva and sclera clear  Head: Normocephalic; atraumatic  ENMT: No nasal discharge; airway clear  Neck: Supple; non tender; no masses  Respiratory: Good air entry  No wheezes, rales or rhonchi  Cardiovascular: Regular rate and rhythm. S1 and S2 Normal; No murmurs  Gastrointestinal: Soft non-tender non-distended; Normal bowel sounds  Genitourinary: No costovertebral angle tenderness  Extremities: No edema, S/p R AKA  Vascular: Peripheral pulses diminished, LLE  Neurological: Alert and oriented x4  Skin: Warm and dry. No acute rash.  LLE with dressing and in a boot   Lymph Nodes: No acute cervical adenopathy  Musculoskeletal: Normal muscle tone, without deformities  Psychiatric: Cooperative and appropriate        LABS:  10-14    144  |  110<H>  |  43<H>  ----------------------------<  81  4.0   |  25  |  3.09<H>    Ca    8.4<L>      14 Oct 2018 06:14                        9.0    5.48  )-----------( 185      ( 14 Oct 2018 06:14 )             29.8       PT/INR - ( 14 Oct 2018 06:14 )   PT: 13.2 sec;   INR: 1.22 ratio        10-13    140  |  105  |  29<H>  ----------------------------<  81  4.0   |  24  |  2.67<H>    Ca    8.2<L>      13 Oct 2018 07:14  Phos  6.8     10-12    TPro  x   /  Alb  2.4<L>  /  TBili  x   /  DBili  x   /  AST  x   /  ALT  x   /  AlkPhos  x   10-12                         9.4    4.94  )-----------( 187      ( 13 Oct 2018 07:14 )             30.9       LIVER FUNCTIONS - ( 12 Oct 2018 08:20 )  Alb: 2.4 g/dL / Pro: x     / ALK PHOS: x     / ALT: x     / AST: x     / GGT: x           PT/INR - ( 13 Oct 2018 07:14 )   PT: 13.1 sec;   INR: 1.21 ratio          Culture - Urine (10.06.18 @ 01:57)    Specimen Source: .Urine None    Culture Results:   >100,000 CFU/ml Klebsiella pneumoniae      Culture - Urine (10.06.18 @ 01:57)    -  Amikacin: S <=8    -  Amoxicillin/Clavulanic Acid: S <=8/4    -  Ampicillin: R >16 These ampicillin results predict results for amoxicillin    -  Ampicillin/Sulbactam: R >16/8    -  Aztreonam: R >16    -  Cefazolin: R >16 For uncomplicated UTI with K. pneumoniae, E. coli, or P. mirablis: DAVIDSON <=16 is sensitive and DAVIDSON >=32 is resistant. This also predicts results for oral agents cefaclor, cefdinir, cefpodoxime, cefprozil, cefuroxime axetil, cephalexin and locarbef for uncomplicated UTI. Note that some isolates may be susceptible to these agents while testing resistant to cefazolin.    -  Cefepime: R >16    -  Cefoxitin: S <=4    -  Ceftriaxone: R >32 Enterobacter, Citrobacter, and Serratia may develop resistance during prolonged therapy    -  Ciprofloxacin: S 1    -  Ertapenem: S <=0.5    -  Gentamicin: R >8    -  Imipenem: S <=1    -  Levofloxacin: S <=1    -  Meropenem: S <=1    -  Nitrofurantoin: R >64 Should not be used to treat pyelonephritis    -  Piperacillin/Tazobactam: R <=8    -  Tigecycline: S <=1    -  Tobramycin: I 8    -  Trimethoprim/Sulfamethoxazole: R >2/38    Specimen Source: .Urine None    Culture Results:   >100,000 CFU/ml Klebsiella pneumoniae ESBL  Multiple Morphological Strains    Organism Identification: Klebsiella pneumoniae ESBL    Organism: Klebsiella pneumoniae ESBL    Method Type: DAVIDSON        RADIOLOGY & ADDITIONAL TESTS:    EXAM:  CT ABDOMEN AND PELVIS                        PROCEDURE DATE:  10/05/2018      FINDINGS:    Absence of intravenous contrast limits evaluation for focal lesions,   neoplasm, and vascular pathology.    Lower Thorax: No consolidation or effusion. Incompletely characterized   oblong right lower lobe opacity which nonemergent CT chest follow-up is   advised for better characterization.    Liver: Redemonstrated incompletely characterize right hepatic lobe   hypodensity during 3 x 1.6 cm on image 19 of series 2 for which   nonemergent liver protocol MR is advised provided no contraindications.  Biliary: No dilatation. Uncomplicated cholelithiasis.  Spleen: No suspicious lesions.      Pancreas: No inflammatory changes or ductal dilatation.      Adrenals: Normal.      Kidneys: No hydronephrosis. Stable scattered renal hypodense and   hyperdense lesions.  Vessels: Normal caliber. Atherosclerotic disease of the aorta and its   branches with associated diffuse dense vascular calcifications.   Infrarenal IVC filter noted. Vascular surgical clips are seen in the   right groin region. Right external iliac artery vascular stent identified.    GI tract: Gastric clips again noted at the fundus. Trace pericolonic   stranding at the level of proximal to mid descending colon. Clinical   correlation is advised to assess for mild colitis.No significant wall   thickening. Moderate fecal burden. No evidence of small bowel   obstruction. Normal-appearing appendix.    Peritoneum/retroperitoneum and mesentery: No free air. No organized fluid   collection. No adenopathy.        Pelvic organs/Bladder: Diffuse bladder wall thickening with trace   perivesicular stranding. Correlate with urinalysis to assess for cystitis   in appropriate clinical setting. Consider nonemergent retrograde   evaluation is concern for bladder malignancy. Heterogenous prostate   containing calcifications.    Abdominal wall: A small umbilical hernia containing portion of   obstructive bowelloop noted.  Bones and soft tissues: Multilevel degenerative changes of the spine   noted stable mild compression deformity of T12 vertebral body.   Degenerative changes of bilateral hip joints with nonspecific lucencies   in the right femoral head. Recommend clinical correlation to assess for   avascular necrosis.    IMPRESSION:    Trace pericolonic stranding at the level of proximal to mid descending   colon. Clinical correlation is advised to assess for mild colitis.No   significant wall thickening. Moderate fecal burden. No evidence of small   bowel obstruction or extraluminal collection.     Redemonstrated incompletely characterize right hepatic lobe hypodensity   during 3 x 1.6 cm for which nonemergent liver protocol MR is advised   provided no contraindications.    Diffuse bladder wall thickening with trace perivesicular stranding.   Correlate with urinalysis to assess for cystitis in appropriate clinical   setting. Consider nonemergent retrograde evaluation is concern for   bladder malignancy.    Uncomplicated cholelithiasis.

## 2018-10-14 NOTE — PROGRESS NOTE ADULT - SUBJECTIVE AND OBJECTIVE BOX
Rn noticed some  increased echymosis to RUE,  still "sore"/unchanged    RUE:   Dressing dry with no further bleeding  + Thrill, no swelling or hematoma  milsly tender to palpation  some increased echymosis , soft  DANNIELLE, NVI    A/P:  Mildly increased echymosos  No hematoma /swelling  minimal pain  continue to monitor

## 2018-10-14 NOTE — PROGRESS NOTE ADULT - SUBJECTIVE AND OBJECTIVE BOX
NEPHROLOGY INTERVAL HPI/OVERNIGHT EVENTS:  doing well, no new c/o   AVF with surrounding swelling noted      MEDICATIONS  (STANDING):  allopurinol 100 milliGRAM(s) Oral daily  aspirin  chewable 81 milliGRAM(s) Oral daily  buDESOnide   0.5 milliGRAM(s) Respule 0.5 milliGRAM(s) Inhalation two times a day  diltiazem    Tablet 30 milliGRAM(s) Oral every 6 hours  doxazosin 8 milliGRAM(s) Oral at bedtime  epoetin nelly Injectable 6000 Unit(s) IV Push <User Schedule>  finasteride 5 milliGRAM(s) Oral daily  heparin  Injectable 5000 Unit(s) SubCutaneous every 8 hours  loratadine 10 milliGRAM(s) Oral daily  meropenem  IVPB 500 milliGRAM(s) IV Intermittent every 24 hours  pantoprazole    Tablet 40 milliGRAM(s) Oral before breakfast  simvastatin 10 milliGRAM(s) Oral at bedtime  vancomycin    Solution 125 milliGRAM(s) Oral every 6 hours  warfarin 3 milliGRAM(s) Oral daily    MEDICATIONS  (PRN):  acetaminophen   Tablet .. 650 milliGRAM(s) Oral every 6 hours PRN Temp greater or equal to 38C (100.4F), Mild Pain (1 - 3)  fentaNYL    Injectable 25 MICROGram(s) IV Push every 5 minutes PRN Moderate Pain (4 - 6)  ondansetron Injectable 4 milliGRAM(s) IV Push once PRN Nausea and/or Vomiting  ondansetron Injectable 4 milliGRAM(s) IV Push every 6 hours PRN Nausea  oxyCODONE    IR 5 milliGRAM(s) Oral once PRN Moderate Pain (4 - 6)  traMADol 50 milliGRAM(s) Oral every 6 hours PRN Moderate Pain (4 - 6)      Allergies    Zosyn (Rash)    Intolerances        I&O's Detail    13 Oct 2018 07:  -  14 Oct 2018 07:00  --------------------------------------------------------  IN:    Other: 250 mL  Total IN: 250 mL    OUT:  Total OUT: 0 mL    Total NET: 250 mL      14 Oct 2018 07:  -  14 Oct 2018 15:25  --------------------------------------------------------  IN:    Oral Fluid: 420 mL  Total IN: 420 mL    OUT:  Total OUT: 0 mL    Total NET: 420 mL          Vital Signs Last 24 Hrs  T(C): 36.9 (14 Oct 2018 12:06), Max: 37.1 (14 Oct 2018 05:45)  T(F): 98.4 (14 Oct 2018 12:06), Max: 98.7 (14 Oct 2018 05:45)  HR: 84 (14 Oct 2018 12:06) (72 - 84)  BP: 132/39 (14 Oct 2018 12:06) (132/39 - 146/45)  BP(mean): --  RR: 18 (14 Oct 2018 12:06) (18 - 18)  SpO2: 96% (14 Oct 2018 12:06) (96% - 98%)  Daily     Daily Weight in k (14 Oct 2018 08:00)    PHYSICAL EXAM:  General: alert. awake Ox3  HEENT: MMM  CV: s1s2 rrr  LUNGS: B/L CTA  EXT: no edema, RUE with swelling + thrill and bruit  warm with + radial     LABS:                        9.0    5.48  )-----------( 185      ( 14 Oct 2018 06:14 )             29.8     10-14    144  |  110<H>  |  43<H>  ----------------------------<  81  4.0   |  25  |  3.09<H>    Ca    8.4<L>      14 Oct 2018 06:14      PT/INR - ( 14 Oct 2018 06:14 )   PT: 13.2 sec;   INR: 1.22 ratio

## 2018-10-14 NOTE — PROVIDER CONTACT NOTE (MEDICATION) - ASSESSMENT
right arm fistula site with increased bruising,dsg intact and dry, +thrill+ bruit noted, +NV on right hand

## 2018-10-14 NOTE — PROGRESS NOTE ADULT - ASSESSMENT
82 y/o M PMHx significant for CAD s/p stents, AFib, diastolic CHF, hx of upper GI bleeding, PVD, hypertension, hypertension, severe COPD (O2 dependent), SHAYY on bipap at night, ESRD on HD (MWF), recurrent c. diff who was BIBA from dialysis ([facility]) where he was found to have a post-dialysis fever associated w/ tachycardia. In triage => /min, Tmax 102.7'F.   Seen in ER with daughter at bedside  chart reviewed case d/w HD nurses  possible UTI  blood cx done    a/p  ESRD on HD   dialysis via cvc  possible UTI  evaluate for bacteremia as well  duplex of UE for avf    10/7   febrile syndrome  cystitis  on Cefepime IV  Vanco po  HD MWF  vein mapping UE  Dr Clement evaluating for possible avf    10/8 SY  --ARYA/CKD   For now HD dependent at [facility] tx schedule.  Will plan for HD in am.  Creat trending down.   Continue to monitor for renal recovery.  --UTI with resistant Klebsiella.  Started on Meropenem today.  --Plan for AVF once ID cleared and if pt does remain on hd this admission.    10/9  ESBL in urine switched to Meropenem   feels well  no new events  on isolation  4 k bath    10/10 MK  - arya/ckd for next hd on friday, remains hd dependent  - esbl uti: on meropenem  - avf after ID clearance, fu trend of the cr    10/11 SY  --ARYA/CKD  Creat is slowly trending down, however, remains oligoanuric with significant fluid weight gain intradialytic period.  plan to continue BIW HD for now.  Plan for AVF this admission if feasible.  --CHF : well compensated on HD.  Again pt may be dependent of HD to prevent CHF decompensation.  --ESBL UTI : Meropenem until tomorrow.  --Vascular eval for LLE wound and AVF.    10/12  Cystitis, Klebsiella pneumoniae, ESBL  On IV meropenem  Continue dialysis as outlined  For AV fistula placement    10/14 MK  - ARYA/CKD remains HD dependent    s/p avf : monitor on restarting of AC

## 2018-10-14 NOTE — PROGRESS NOTE ADULT - ASSESSMENT
84 y/o M PMHx significant for CAD s/p stents, AFib, diastolic CHF, hx of upper GI bleeding, PVD, hypertension, hypertension, severe COPD (O2 dependent), SHAYY on bipap at night, ESRD on HD (MWF), recurrent c. diff who was admitted for:     * Fevers due to Klebsiella  UTI/ cystitis ESBL, resolving   - fevers resolved   - leukocytosis better   - CT reviewed   - No diarrhea, off isolation   - F/u BCX: NGTD   - UCX: + Klebsiella   - C/w [id number] ->meropenem completed 5 days  - On PO Vanco for Cdiff PPxs     * CAD/AFib  -  rate control   - c/w  Diltiazem 30mg po q6h  - cont. ASA 81mg po daily  - restarted on coumadin 5 mg, pt/inr daily, will hold 10/12 for now  for AVF as per vascular surgery  -cardiology consult - for risk stratification and choice of  AC   - d/w Dr. Carvalho - ok to restart AC with increased risk of bleeding     3)BPH  - c/w  Doxazosin 8mg po qHS  - c/w Dutasteride 0.5mg po qHS    4) COPD/SHAYY  - stable respiratory status   -CXR: neg   -cont. Budesonide nebs  -cont. supplemental O2 PRN   - BiPAP at night    5)ESRD  - C/w HD as per Renal   10/13 - s/p  right brachiocephalic AVF by Dr. Clement  - pain management       6) PAD, s/p R AKA, Chronic LLE ulcers   wound care eval  wound orders as per SX   vascular surgery consult    7) Allergic rhinitis  - restart claritin        6)Vte ppx

## 2018-10-14 NOTE — PROGRESS NOTE ADULT - SUBJECTIVE AND OBJECTIVE BOX
no pain; no symptoms of steal in R hand    PE  antecubital fossa ecchymosis without hematoma  pulsatile thrill proximally and non pulsatile further up arm  hand well perfused    A/P  Patent AVF with area of stenosis within several centimeters of anastomosis    will allow further dilatation but anticipate angioplasty pre emptive may be required    will discuss with renal re status of dialysis: i.e. whether there is hope of recovery and therefore I would forego angiogram of L leg for now    MEDICATIONS  (STANDING):  allopurinol 100 milliGRAM(s) Oral daily  aspirin  chewable 81 milliGRAM(s) Oral daily  buDESOnide   0.5 milliGRAM(s) Respule 0.5 milliGRAM(s) Inhalation two times a day  diltiazem    Tablet 30 milliGRAM(s) Oral every 6 hours  doxazosin 8 milliGRAM(s) Oral at bedtime  epoetin nelly Injectable 6000 Unit(s) IV Push <User Schedule>  finasteride 5 milliGRAM(s) Oral daily  heparin  Injectable 5000 Unit(s) SubCutaneous every 8 hours  loratadine 10 milliGRAM(s) Oral daily  meropenem  IVPB 500 milliGRAM(s) IV Intermittent every 24 hours  pantoprazole    Tablet 40 milliGRAM(s) Oral before breakfast  simvastatin 10 milliGRAM(s) Oral at bedtime  vancomycin    Solution 125 milliGRAM(s) Oral every 6 hours    MEDICATIONS  (PRN):  acetaminophen   Tablet .. 650 milliGRAM(s) Oral every 6 hours PRN Temp greater or equal to 38C (100.4F), Mild Pain (1 - 3)  fentaNYL    Injectable 25 MICROGram(s) IV Push every 5 minutes PRN Moderate Pain (4 - 6)  ondansetron Injectable 4 milliGRAM(s) IV Push once PRN Nausea and/or Vomiting  ondansetron Injectable 4 milliGRAM(s) IV Push every 6 hours PRN Nausea  oxyCODONE    IR 5 milliGRAM(s) Oral once PRN Moderate Pain (4 - 6)  traMADol 50 milliGRAM(s) Oral every 6 hours PRN Moderate Pain (4 - 6)      Allergies    Zosyn (Rash)    Intolerances        Flatus: [ ] YES [ ] NO             Bowel Movement: [ ] YES [ ] NO  Pain (0-10):            Pain Control Adequate: [ ] YES [ ] NO  Nausea: [ ] YES [ ] NO            Vomiting: [ ] YES [ ] NO  Diarrhea: [ ] YES [ ] NO         Constipation: [ ] YES [ ] NO     Chest Pain: [ ] YES [ ] NO    SOB:  [ ] YES [ ] NO    Vital Signs Last 24 Hrs  T(C): 37.1 (14 Oct 2018 05:45), Max: 37.2 (13 Oct 2018 10:45)  T(F): 98.7 (14 Oct 2018 05:45), Max: 98.9 (13 Oct 2018 10:45)  HR: 73 (14 Oct 2018 05:45) (71 - 78)  BP: 146/45 (14 Oct 2018 05:45) (118/46 - 146/45)  BP(mean): --  RR: 18 (14 Oct 2018 05:45) (14 - 18)  SpO2: 97% (14 Oct 2018 05:45) (96% - 100%)    I&O's Summary    12 Oct 2018 07:01  -  13 Oct 2018 07:00  --------------------------------------------------------  IN: 0 mL / OUT: 1 mL / NET: -1 mL    13 Oct 2018 07:01  -  14 Oct 2018 06:46  --------------------------------------------------------  IN: 250 mL / OUT: 0 mL / NET: 250 mL        Physical Exam:  General: NAD, resting comfortably  Pulmonary: normal resp effort, CTA-B  Cardiovascular: NSR  Abdominal: soft, NT/ND  Extremities: WWP, normal strength  Neuro: A/O x 3, CNs II-XII grossly intact, normal motor/sensation, no focal deficits  Pulses:   Right:                                                                          Left:  FEM [ ]2+ [ ]1+ [ ]doppler                                             FEM [ ]2+ [ ]1+ [ ]doppler    POP [ ]2+ [ ]1+ [ ]doppler                                             POP [ ]2+ [ ]1+ [ ]doppler    DP [ ]2+ [ ]1+ [ ]doppler                                                DP [ ]2+ [ ]1+ [ ]doppler  PT[ ]2+ [ ]1+ [ ]doppler                                                  PT [ ]2+ [ ]1+ [ ]doppler    LABS:                        9.4    4.94  )-----------( 187      ( 13 Oct 2018 07:14 )             30.9     10-13    140  |  105  |  29<H>  ----------------------------<  81  4.0   |  24  |  2.67<H>    Ca    8.2<L>      13 Oct 2018 07:14  Phos  6.8     10-12    TPro  x   /  Alb  2.4<L>  /  TBili  x   /  DBili  x   /  AST  x   /  ALT  x   /  AlkPhos  x   10-12    PT/INR - ( 13 Oct 2018 07:14 )   PT: 13.1 sec;   INR: 1.21 ratio             LIVER FUNCTIONS - ( 12 Oct 2018 08:20 )  Alb: 2.4 g/dL / Pro: x     / ALK PHOS: x     / ALT: x     / AST: x     / GGT: x           CAPILLARY BLOOD GLUCOSE          RADIOLOGY & ADDITIONAL TESTS:

## 2018-10-15 ENCOUNTER — TRANSCRIPTION ENCOUNTER (OUTPATIENT)
Age: 83
End: 2018-10-15

## 2018-10-15 LAB
ALBUMIN SERPL ELPH-MCNC: 2.4 G/DL — LOW (ref 3.3–5)
ANION GAP SERPL CALC-SCNC: 13 MMOL/L — SIGNIFICANT CHANGE UP (ref 5–17)
BUN SERPL-MCNC: 55 MG/DL — HIGH (ref 7–23)
CALCIUM SERPL-MCNC: 8 MG/DL — LOW (ref 8.5–10.1)
CHLORIDE SERPL-SCNC: 108 MMOL/L — SIGNIFICANT CHANGE UP (ref 96–108)
CO2 SERPL-SCNC: 20 MMOL/L — LOW (ref 22–31)
CREAT SERPL-MCNC: 3.39 MG/DL — HIGH (ref 0.5–1.3)
GLUCOSE SERPL-MCNC: 87 MG/DL — SIGNIFICANT CHANGE UP (ref 70–99)
HCT VFR BLD CALC: 29.5 % — LOW (ref 39–50)
HGB BLD-MCNC: 8.9 G/DL — LOW (ref 13–17)
INR BLD: 1.18 RATIO — HIGH (ref 0.88–1.16)
MCHC RBC-ENTMCNC: 29.5 PG — SIGNIFICANT CHANGE UP (ref 27–34)
MCHC RBC-ENTMCNC: 30.2 GM/DL — LOW (ref 32–36)
MCV RBC AUTO: 97.7 FL — SIGNIFICANT CHANGE UP (ref 80–100)
NRBC # BLD: 0 /100 WBCS — SIGNIFICANT CHANGE UP (ref 0–0)
PHOSPHATE SERPL-MCNC: 8.5 MG/DL — HIGH (ref 2.5–4.5)
PLATELET # BLD AUTO: 180 K/UL — SIGNIFICANT CHANGE UP (ref 150–400)
POTASSIUM SERPL-MCNC: 4.5 MMOL/L — SIGNIFICANT CHANGE UP (ref 3.5–5.3)
POTASSIUM SERPL-SCNC: 4.5 MMOL/L — SIGNIFICANT CHANGE UP (ref 3.5–5.3)
PROTHROM AB SERPL-ACNC: 12.8 SEC — HIGH (ref 9.8–12.7)
RBC # BLD: 3.02 M/UL — LOW (ref 4.2–5.8)
RBC # FLD: 15.5 % — HIGH (ref 10.3–14.5)
SODIUM SERPL-SCNC: 141 MMOL/L — SIGNIFICANT CHANGE UP (ref 135–145)
WBC # BLD: 5.83 K/UL — SIGNIFICANT CHANGE UP (ref 3.8–10.5)
WBC # FLD AUTO: 5.83 K/UL — SIGNIFICANT CHANGE UP (ref 3.8–10.5)

## 2018-10-15 RX ORDER — TRAMADOL HYDROCHLORIDE 50 MG/1
1 TABLET ORAL
Qty: 0 | Refills: 0 | COMMUNITY
Start: 2018-10-15

## 2018-10-15 RX ORDER — WARFARIN SODIUM 2.5 MG/1
1 TABLET ORAL
Qty: 0 | Refills: 0 | COMMUNITY
Start: 2018-10-15

## 2018-10-15 RX ORDER — ACETAMINOPHEN 500 MG
2 TABLET ORAL
Qty: 0 | Refills: 0 | DISCHARGE
Start: 2018-10-15

## 2018-10-15 RX ORDER — PANTOPRAZOLE SODIUM 20 MG/1
1 TABLET, DELAYED RELEASE ORAL
Qty: 0 | Refills: 0 | COMMUNITY
Start: 2018-10-15

## 2018-10-15 RX ADMIN — PANTOPRAZOLE SODIUM 40 MILLIGRAM(S): 20 TABLET, DELAYED RELEASE ORAL at 05:23

## 2018-10-15 RX ADMIN — Medication 125 MILLIGRAM(S): at 13:06

## 2018-10-15 RX ADMIN — Medication 8 MILLIGRAM(S): at 21:54

## 2018-10-15 RX ADMIN — WARFARIN SODIUM 3 MILLIGRAM(S): 2.5 TABLET ORAL at 21:54

## 2018-10-15 RX ADMIN — Medication 100 MILLIGRAM(S): at 13:07

## 2018-10-15 RX ADMIN — ERYTHROPOIETIN 6000 UNIT(S): 10000 INJECTION, SOLUTION INTRAVENOUS; SUBCUTANEOUS at 11:04

## 2018-10-15 RX ADMIN — HEPARIN SODIUM 5000 UNIT(S): 5000 INJECTION INTRAVENOUS; SUBCUTANEOUS at 05:23

## 2018-10-15 RX ADMIN — Medication 0.5 MILLIGRAM(S): at 20:28

## 2018-10-15 RX ADMIN — FINASTERIDE 5 MILLIGRAM(S): 5 TABLET, FILM COATED ORAL at 13:11

## 2018-10-15 RX ADMIN — HEPARIN SODIUM 5000 UNIT(S): 5000 INJECTION INTRAVENOUS; SUBCUTANEOUS at 21:54

## 2018-10-15 RX ADMIN — LORATADINE 10 MILLIGRAM(S): 10 TABLET ORAL at 13:07

## 2018-10-15 RX ADMIN — Medication 125 MILLIGRAM(S): at 05:24

## 2018-10-15 RX ADMIN — HEPARIN SODIUM 5000 UNIT(S): 5000 INJECTION INTRAVENOUS; SUBCUTANEOUS at 13:11

## 2018-10-15 RX ADMIN — Medication 81 MILLIGRAM(S): at 13:11

## 2018-10-15 RX ADMIN — Medication 125 MILLIGRAM(S): at 18:19

## 2018-10-15 RX ADMIN — SIMVASTATIN 10 MILLIGRAM(S): 20 TABLET, FILM COATED ORAL at 21:54

## 2018-10-15 RX ADMIN — MEROPENEM 100 MILLIGRAM(S): 1 INJECTION INTRAVENOUS at 13:35

## 2018-10-15 NOTE — DISCHARGE NOTE ADULT - CARE PROVIDER_API CALL
Jason Clement), Vascular Surgery  [address]  [place] [address]  Phone: [phone]  Fax: [phone]    Irma Tomas), Internal Medicine; Nephrology  [address] [facility]  [address] [place] [address]  Phone: [phone]  Fax: [phone]    PCP,   Phone: (   )    -  Fax: (   )    -    Devante Hernández), Gastroenterology  [address] [place] [address]  Phone: [phone]  Fax: [phone]

## 2018-10-15 NOTE — DISCHARGE NOTE ADULT - MEDICATION SUMMARY - MEDICATIONS TO TAKE
I will START or STAY ON the medications listed below when I get home from the hospital:    dutasteride 0.5 mg oral capsule  -- 1 cap(s) by mouth once a day (at bedtime)  -- Indication: For home meds    budesonide 0.5 mg/2 mL inhalation suspension  -- 2 milliliter(s) inhaled 2 times a day  -- Indication: For home meds    aspirin 81 mg oral tablet, chewable  -- 1 tab(s) by mouth once a day  -- Indication: For home meds    traMADol 50 mg oral tablet  -- 1 tab(s) by mouth every 6 hours, As needed, Moderate Pain (4 - 6)  -- Indication: For home meds    acetaminophen 325 mg oral tablet  -- 2 tab(s) by mouth every 6 hours, As needed, Temp greater or equal to 38C (100.4F), Mild Pain (1 - 3)  -- Indication: For home meds    dilTIAZem 30 mg oral tablet  -- 1 tab(s) by mouth every 6 hours  -- Indication: For home meds    warfarin 3 mg oral tablet  -- 1 tab(s) by mouth once a day check INR in 1-2 days, adjust the dose to keep INR between 2-3  -- Indication: For Atrial fibrilation    allopurinol 100 mg oral tablet  -- 1 tab(s) by mouth once a day  -- Indication: For home meds    Claritin 10 mg oral tablet  -- 1 tab(s) by mouth once a day  -- Indication: For home meds    simvastatin 10 mg oral tablet  -- 1 tab(s) by mouth once a day (at bedtime)  -- Indication: For home meds    cholestyramine 4 g/9 g oral powder for reconstitution  -- 1 packet(s) by mouth once a day  -- Indication: For home meds    pantoprazole 40 mg oral delayed release tablet  -- 1 tab(s) by mouth once a day (before a meal)  -- Indication: For h/o GI bleeding     Vitamin D3 1000 intl units oral tablet  -- 1 tab(s) by mouth once a day  -- Indication: For home meds I will START or STAY ON the medications listed below when I get home from the hospital:    dutasteride 0.5 mg oral capsule  -- 1 cap(s) by mouth once a day (at bedtime)  -- Indication: For home meds    budesonide 0.5 mg/2 mL inhalation suspension  -- 2 milliliter(s) inhaled 2 times a day  -- Indication: For home meds    aspirin 81 mg oral tablet, chewable  -- 1 tab(s) by mouth once a day  -- Indication: For home meds    traMADol 50 mg oral tablet  -- 1 tab(s) by mouth every 6 hours, As needed, Moderate Pain (4 - 6)  -- Indication: For home meds    acetaminophen 325 mg oral tablet  -- 2 tab(s) by mouth every 6 hours, As needed, Temp greater or equal to 38C (100.4F), Mild Pain (1 - 3)  -- Indication: For home meds    dilTIAZem 30 mg oral tablet  -- 1 tab(s) by mouth every 6 hours  -- Indication: For home meds    warfarin 3 mg oral tablet  -- 1 tab(s) by mouth once a day check INR in 1-2 days, adjust the dose to keep INR between 2-3  -- Indication: For Atrial fibrilation    allopurinol 100 mg oral tablet  -- 1 tab(s) by mouth once a day  -- Indication: For home meds    Claritin 10 mg oral tablet  -- 1 tab(s) by mouth once a day  -- Indication: For home meds    simvastatin 10 mg oral tablet  -- 1 tab(s) by mouth once a day (at bedtime)  -- Indication: For home meds    cholestyramine 4 g/9 g oral powder for reconstitution  -- 1 packet(s) by mouth once a day  -- Indication: For home meds    vancomycin 125 mg oral capsule  -- 1 cap(s) by mouth every 6 hours  -- Indication: For Prophylaxis after antibiotics    pantoprazole 40 mg oral delayed release tablet  -- 1 tab(s) by mouth once a day (before a meal)  -- Indication: For h/o GI bleeding     Vitamin D3 1000 intl units oral tablet  -- 1 tab(s) by mouth once a day  -- Indication: For home meds

## 2018-10-15 NOTE — DISCHARGE NOTE ADULT - CARE PLAN
Principal Discharge DX:	Sepsis, due to unspecified organism  Goal:	prevent recurrence  Assessment and plan of treatment:	completed antibiotics , follow up with PCP within 1 week  Secondary Diagnosis:	Afib  Assessment and plan of treatment:	started on coumadin 3 mg, check INR in 1-2 days, adjust the dose to keep INR between 2-3  monitor CBC weekly, follow up with cardiologist  within 1-2 weeks  monitor for signs of rectal bleeding  Secondary Diagnosis:	CKD (chronic kidney disease)  Assessment and plan of treatment:	s/p right arm AV fistula formation, follow up with vascular surgery within 1 week Dr. Clement for post-operative check  Secondary Diagnosis:	Peripheral vascular disease  Assessment and plan of treatment:	follow up with Dr. Clement for chronic left leg vascular ulcers management   local wound care  Secondary Diagnosis:	Hepatic lesion  Assessment and plan of treatment:	follow up with Dr. Carvalho for further work-up and monitoring

## 2018-10-15 NOTE — PROGRESS NOTE ADULT - SUBJECTIVE AND OBJECTIVE BOX
CC: Fever and palpitations     HPI:     82 y/o M PMHx significant for CAD s/p stents, AFib, diastolic CHF, hx of upper GI bleeding, PVD, hypertension, hypertension, severe COPD (O2 dependent), SHAYY on bipap at night, ESRD on HD (MWF), recurrent c. diff who was BIBA on 10/5/18  from dialysis ([facility]) where he was found to have a post-dialysis fever associated w/ tachycardia. In triage => /min, Tmax 102.7'F. Labs => LA 1.2, BUN/Cr 27/1.59, Alb 2.7, Phos 4.2. UA (+), RVP (-). CT ABD/Pelvis => Trace pericolonic stranding at the level of proximal to mid descending colon. Clinical correlation is advised to assess for mild colitis. No significant wall thickening. Moderate fecal burden. No evidence of small bowel obstruction or extraluminal collection. Re-demonstrated incompletely characterize right hepatic lobe hypodensity during 3 x 1.6 cm for which nonemergent liver protocol MR is advised provided no contraindications. Diffuse bladder wall thickening with trace perivesicular stranding. Correlate with urinalysis to assess for cystitis in appropriate clinical setting. Consider nonemergent retrograde evaluation is concern for bladder malignancy. Uncomplicated cholelithiasis. In the ED the patient was given [id number] IVPB x 1, Hpibhypmzj0u IVPB x 1, NS 1.5L x 1.         INTERVAL HPI/ OVERNIGHT EVENTS:    10/7/18 Pt was seen and examined,  reports feeling well no complains. No fevers, no CP or SOB   10/8 /18 pt seen and examined, denies fever, chills, abdominal pain, tolerating PO diet, denies new sx  10/9/18 - pt seen and examined at HD, denies CP, dyspnea, weakness, afebrile  10/10/18 - pt seen and examined, denies fever, chills, + nasal congestion, denies new sx  10/11/18 - pt seen and examined , reports left leg pain, dressing removed feels better , denies fever, chills  10/12/18 - pt seen and examined, denies left leg pain, afebrile, tolerated HD well  10/13 - s/p  right brachiocephalic AVF , tolerated procedure well, pain 6/10, denies new sx, afebrile  10/14 - pt seen and examined, right arm pain better, denies Cp, dyspnea, palpitations, afebrile, no new sx  10/15 - pt seen and examined, reports right arm mild pain, denies Cp, dyspnea, afebrile, tolerating HD , + gen weakness  REVIEW OF SYSTEMS:  All other review of systems is negative unless indicated above.    T(C): 37.5 (10-15-18 @ 17:36), Max: 37.6 (10-15-18 @ 08:01)  T(F): 99.5 (10-15-18 @ 17:36), Max: 99.6 (10-15-18 @ 08:01)  HR: 83 (10-15-18 @ 17:36) (60 - 85)  BP: 137/40 (10-15-18 @ 17:36) (119/53 - 147/56)  RR: 18 (10-15-18 @ 17:36) (16 - 187)  SpO2: 96% (10-15-18 @ 17:36) (95% - 100%)  Wt(kg): --    PHYSICAL EXAM:  General: Well developed; well nourished; in no acute distress  Eyes: PERRLA, EOMI; conjunctiva and sclera clear  Head: Normocephalic; atraumatic  ENMT: No nasal discharge; airway clear  Neck: Supple; non tender; no masses  Respiratory: Good air entry  No wheezes, rales or rhonchi  Cardiovascular: Regular rate and rhythm. S1 and S2 Normal; No murmurs  Gastrointestinal: Soft non-tender non-distended; Normal bowel sounds  Genitourinary: No costovertebral angle tenderness  Extremities: No edema, S/p R AKA  Vascular: Peripheral pulses diminished, LLE  Neurological: Alert and oriented x4  Skin: Warm and dry. No acute rash.  LLE with dressing and in a boot   Lymph Nodes: No acute cervical adenopathy  Musculoskeletal: Normal muscle tone, without deformities  Psychiatric: Cooperative and appropriate        LABS:  10-15    141  |  108  |  55<H>  ----------------------------<  87  4.5   |  20<L>  |  3.39<H>    Ca    8.0<L>      15 Oct 2018 07:30  Phos  8.5     10-15    TPro  x   /  Alb  2.4<L>  /  TBili  x   /  DBili  x   /  AST  x   /  ALT  x   /  AlkPhos  x   10-15                        8.9    5.83  )-----------( 180      ( 15 Oct 2018 07:30 )             29.5       LIVER FUNCTIONS - ( 15 Oct 2018 07:30 )  Alb: 2.4 g/dL / Pro: x     / ALK PHOS: x     / ALT: x     / AST: x     / GGT: x             PT/INR - ( 15 Oct 2018 07:30 )   PT: 12.8 sec;   INR: 1.18 ratio               10-14    144  |  110<H>  |  43<H>  ----------------------------<  81  4.0   |  25  |  3.09<H>    Ca    8.4<L>      14 Oct 2018 06:14                        9.0    5.48  )-----------( 185      ( 14 Oct 2018 06:14 )             29.8       PT/INR - ( 14 Oct 2018 06:14 )   PT: 13.2 sec;   INR: 1.22 ratio        10-13    140  |  105  |  29<H>  ----------------------------<  81  4.0   |  24  |  2.67<H>    Ca    8.2<L>      13 Oct 2018 07:14  Phos  6.8     10-12    TPro  x   /  Alb  2.4<L>  /  TBili  x   /  DBili  x   /  AST  x   /  ALT  x   /  AlkPhos  x   10-12                         9.4    4.94  )-----------( 187      ( 13 Oct 2018 07:14 )             30.9       LIVER FUNCTIONS - ( 12 Oct 2018 08:20 )  Alb: 2.4 g/dL / Pro: x     / ALK PHOS: x     / ALT: x     / AST: x     / GGT: x           PT/INR - ( 13 Oct 2018 07:14 )   PT: 13.1 sec;   INR: 1.21 ratio          Culture - Urine (10.06.18 @ 01:57)    Specimen Source: .Urine None    Culture Results:   >100,000 CFU/ml Klebsiella pneumoniae      Culture - Urine (10.06.18 @ 01:57)    -  Amikacin: S <=8    -  Amoxicillin/Clavulanic Acid: S <=8/4    -  Ampicillin: R >16 These ampicillin results predict results for amoxicillin    -  Ampicillin/Sulbactam: R >16/8    -  Aztreonam: R >16    -  Cefazolin: R >16 For uncomplicated UTI with K. pneumoniae, E. coli, or P. mirablis: DAVIDSON <=16 is sensitive and DAVIDSON >=32 is resistant. This also predicts results for oral agents cefaclor, cefdinir, cefpodoxime, cefprozil, cefuroxime axetil, cephalexin and locarbef for uncomplicated UTI. Note that some isolates may be susceptible to these agents while testing resistant to cefazolin.    -  Cefepime: R >16    -  Cefoxitin: S <=4    -  Ceftriaxone: R >32 Enterobacter, Citrobacter, and Serratia may develop resistance during prolonged therapy    -  Ciprofloxacin: S 1    -  Ertapenem: S <=0.5    -  Gentamicin: R >8    -  Imipenem: S <=1    -  Levofloxacin: S <=1    -  Meropenem: S <=1    -  Nitrofurantoin: R >64 Should not be used to treat pyelonephritis    -  Piperacillin/Tazobactam: R <=8    -  Tigecycline: S <=1    -  Tobramycin: I 8    -  Trimethoprim/Sulfamethoxazole: R >2/38    Specimen Source: .Urine None    Culture Results:   >100,000 CFU/ml Klebsiella pneumoniae ESBL  Multiple Morphological Strains    Organism Identification: Klebsiella pneumoniae ESBL    Organism: Klebsiella pneumoniae ESBL    Method Type: DAVIDSON        RADIOLOGY & ADDITIONAL TESTS:    EXAM:  CT ABDOMEN AND PELVIS                        PROCEDURE DATE:  10/05/2018      FINDINGS:    Absence of intravenous contrast limits evaluation for focal lesions,   neoplasm, and vascular pathology.    Lower Thorax: No consolidation or effusion. Incompletely characterized   oblong right lower lobe opacity which nonemergent CT chest follow-up is   advised for better characterization.    Liver: Redemonstrated incompletely characterize right hepatic lobe   hypodensity during 3 x 1.6 cm on image 19 of series 2 for which   nonemergent liver protocol MR is advised provided no contraindications.  Biliary: No dilatation. Uncomplicated cholelithiasis.  Spleen: No suspicious lesions.      Pancreas: No inflammatory changes or ductal dilatation.      Adrenals: Normal.      Kidneys: No hydronephrosis. Stable scattered renal hypodense and   hyperdense lesions.  Vessels: Normal caliber. Atherosclerotic disease of the aorta and its   branches with associated diffuse dense vascular calcifications.   Infrarenal IVC filter noted. Vascular surgical clips are seen in the   right groin region. Right external iliac artery vascular stent identified.    GI tract: Gastric clips again noted at the fundus. Trace pericolonic   stranding at the level of proximal to mid descending colon. Clinical   correlation is advised to assess for mild colitis.No significant wall   thickening. Moderate fecal burden. No evidence of small bowel   obstruction. Normal-appearing appendix.    Peritoneum/retroperitoneum and mesentery: No free air. No organized fluid   collection. No adenopathy.        Pelvic organs/Bladder: Diffuse bladder wall thickening with trace   perivesicular stranding. Correlate with urinalysis to assess for cystitis   in appropriate clinical setting. Consider nonemergent retrograde   evaluation is concern for bladder malignancy. Heterogenous prostate   containing calcifications.    Abdominal wall: A small umbilical hernia containing portion of   obstructive bowelloop noted.  Bones and soft tissues: Multilevel degenerative changes of the spine   noted stable mild compression deformity of T12 vertebral body.   Degenerative changes of bilateral hip joints with nonspecific lucencies   in the right femoral head. Recommend clinical correlation to assess for   avascular necrosis.    IMPRESSION:    Trace pericolonic stranding at the level of proximal to mid descending   colon. Clinical correlation is advised to assess for mild colitis.No   significant wall thickening. Moderate fecal burden. No evidence of small   bowel obstruction or extraluminal collection.     Redemonstrated incompletely characterize right hepatic lobe hypodensity   during 3 x 1.6 cm for which nonemergent liver protocol MR is advised   provided no contraindications.    Diffuse bladder wall thickening with trace perivesicular stranding.   Correlate with urinalysis to assess for cystitis in appropriate clinical   setting. Consider nonemergent retrograde evaluation is concern for   bladder malignancy.    Uncomplicated cholelithiasis.    MEDICATIONS  (STANDING):  allopurinol 100 milliGRAM(s) Oral daily  aspirin  chewable 81 milliGRAM(s) Oral daily  buDESOnide   0.5 milliGRAM(s) Respule 0.5 milliGRAM(s) Inhalation two times a day  diltiazem    Tablet 30 milliGRAM(s) Oral every 6 hours  doxazosin 8 milliGRAM(s) Oral at bedtime  epoetin nelly Injectable 6000 Unit(s) IV Push <User Schedule>  finasteride 5 milliGRAM(s) Oral daily  heparin  Injectable 5000 Unit(s) SubCutaneous every 8 hours  loratadine 10 milliGRAM(s) Oral daily  meropenem  IVPB 500 milliGRAM(s) IV Intermittent every 24 hours  pantoprazole    Tablet 40 milliGRAM(s) Oral before breakfast  simvastatin 10 milliGRAM(s) Oral at bedtime  vancomycin    Solution 125 milliGRAM(s) Oral every 6 hours  warfarin 3 milliGRAM(s) Oral daily    MEDICATIONS  (PRN):  acetaminophen   Tablet .. 650 milliGRAM(s) Oral every 6 hours PRN Temp greater or equal to 38C (100.4F), Mild Pain (1 - 3)  fentaNYL    Injectable 25 MICROGram(s) IV Push every 5 minutes PRN Moderate Pain (4 - 6)  ondansetron Injectable 4 milliGRAM(s) IV Push once PRN Nausea and/or Vomiting  ondansetron Injectable 4 milliGRAM(s) IV Push every 6 hours PRN Nausea  oxyCODONE    IR 5 milliGRAM(s) Oral once PRN Moderate Pain (4 - 6)  traMADol 50 milliGRAM(s) Oral every 6 hours PRN Moderate Pain (4 - 6)

## 2018-10-15 NOTE — DISCHARGE NOTE ADULT - PATIENT PORTAL LINK FT
You can access the [facility] Patient Portal, offered by [facility], by registering with the following website: [email or web address]

## 2018-10-15 NOTE — PROGRESS NOTE ADULT - ASSESSMENT
82 y/o M PMHx significant for CAD s/p stents, AFib, diastolic CHF, hx of upper GI bleeding, PVD, hypertension, hypertension, severe COPD (O2 dependent), SHAYY on bipap at night, ESRD on HD (MW), recurrent c. diff who was admitted for:     * Fevers due to Klebsiella  UTI/ cystitis ESBL, resolving   - fevers resolved   - CT reviewed   - No diarrhea, off isolation   - F/u BCX: NGTD   - UCX: + Klebsiella   - C/w [id number] ->meropenem completed 5 days  - On PO Vanco for Cdiff PPxs  - will stop    * CAD/AFib  -  rate control   - c/w  Diltiazem 30mg po q6h  - cont. ASA 81mg po daily  - restarted on coumadin 5 mg, pt/inr daily, will hold 10/12 for now  for AVF as per vascular surgery  -cardiology consult - for risk stratification and choice of  AC   - coumadin restart post-op c/w coumadin 3 mg , daily PT/INR   - repeat cbc in am   - d/w Dr. Carvalho - ok to restart AC with increased risk of bleeding     3)BPH  - c/w  Doxazosin 8mg po qHS  - c/w Dutasteride 0.5mg po qHS    4) COPD/SHAYY  - stable respiratory status   -CXR: neg   -cont. Budesonide nebs  -cont. supplemental O2 PRN   - BiPAP at night    5)ESRD  - C/w HD as per Renal   10/13 - s/p  right brachiocephalic AVF by Dr. Clement  - pain management       6) PAD, s/p R AKA, Chronic LLE ulcers   wound care eval  wound orders as per SX   vascular surgery consult    7) Allergic rhinitis  - restart claritin        6)Vte ppx    Dispo - plan for d/c in am  to [facility] 84 y/o M PMHx significant for CAD s/p stents, AFib, diastolic CHF, hx of upper GI bleeding, PVD, hypertension, hypertension, severe COPD (O2 dependent), SHAYY on bipap at night, ESRD on HD (MWF), recurrent c. diff who was admitted for:     * Fevers due to Klebsiella  UTI/ cystitis ESBL, resolving   - fevers resolved   - CT reviewed   - No diarrhea, off isolation   - F/u BCX: NGTD   - UCX: + Klebsiella   - C/w [id number] ->meropenem completed 3 days, d/w Dr. Masha wu for discharge  - On PO Vanco for Cdiff PPxs  - will stop    * CAD/AFib  -  rate control   - c/w  Diltiazem 30mg po q6h  - cont. ASA 81mg po daily  - restarted on coumadin 5 mg, pt/inr daily, will hold 10/12 for now  for AVF as per vascular surgery  -cardiology consult - for risk stratification and choice of  AC   - coumadin restart post-op c/w coumadin 3 mg , daily PT/INR   - repeat cbc in am   - d/w Dr. Maia wu to restart AC with increased risk of bleeding     3)BPH  - c/w  Doxazosin 8mg po qHS  - c/w Dutasteride 0.5mg po qHS    4) COPD/SHAYY  - stable respiratory status   -CXR: neg   -cont. Budesonide nebs  -cont. supplemental O2 PRN   - BiPAP at night    5)ESRD  - C/w HD as per Renal   10/13 - s/p  right brachiocephalic AVF by Dr. Clement  - pain management       6) PAD, s/p R AKA, Chronic LLE ulcers   wound care eval  wound orders as per SX   vascular surgery consult    7) Allergic rhinitis  - restart claritin        6)Vte ppx    Dispo - plan for d/c in am  to [facility]

## 2018-10-15 NOTE — PROGRESS NOTE ADULT - SUBJECTIVE AND OBJECTIVE BOX
Patient is a 83y old  Male who presents with a chief complaint of Fever and palpitations (14 Oct 2018 15:24)      HPI:  82 y/o M PMHx significant for CAD s/p stents, AFib, diastolic CHF, hx of upper GI bleeding, PVD, hypertension, hypertension, severe COPD (O2 dependent), SHAYY on bipap at night, ESRD on HD (MWF), recurrent c. diff who was BIBA from dialysis ([facility]) where he was found to have a post-dialysis fever associated w/ tachycardia. In triage => /min, Tmax 102.7'F. Labs => LA 1.2, BUN/Cr 27/1.59, Alb 2.7, Phos 4.2. UA (+), RVP (-). CT ABD/Pelvis => Trace pericolonic stranding at the level of proximal to mid descending colon. Clinical correlation is advised to assess for mild colitis. No significant wall thickening. Moderate fecal burden. No evidence of small bowel obstruction or extraluminal collection. Re-demonstrated incompletely characterize right hepatic lobe hypodensity during 3 x 1.6 cm for which nonemergent liver protocol MR is advised provided no contraindications. Diffuse bladder wall thickening with trace perivesicular stranding. Correlate with urinalysis to assess for cystitis in appropriate clinical setting. Consider nonemergent retrograde evaluation is concern for bladder malignancy. Uncomplicated cholelithiasis. In the ED the patient was given Htathcck3o IVPB x 1, Yrheqhvdtj3j IVPB x 1, NS 1.5L x 1. (06 Oct 2018 03:43)    Patient comfortable. Positive fatigue. Negative nausea or vomiting. Tolerating diet. Negative blood in stool. Did have some mild reflux but feels that this is well controlled      PAST MEDICAL & SURGICAL HISTORY:  Above knee amputation of right lower extremity  Recurrent Clostridium difficile diarrhea  Diastolic CHF  Peripheral vascular disease  Afib  Anemia  CKD (chronic kidney disease)  COPD (chronic obstructive pulmonary disease)  SHAYY (obstructive sleep apnea)  Sepsis, due to unspecified organism: 2/2 poorly healing wounds b/l  Dyspepsia: On moderate exertion.  Sleep apnea, obstructive: Requires home 02 therapy, and treatment with BIPAP  Atelectasis  Pleural effusion, bilateral  Respiratory failure  Peripheral edema  CRI (chronic renal insufficiency)  Gout  Benign prostatic hypertrophy  Spinal stenosis  Hypercholesterolemia  GERD (gastroesophageal reflux disease)  CAD (coronary artery disease)  Hypertension  S/P angioplasty with stent  Cataract of left eye  Prostate: Surgery green light procedure.  S/P rotator cuff surgery: Right  S/P angioplasty      MEDICATIONS  (STANDING):  allopurinol 100 milliGRAM(s) Oral daily  aspirin  chewable 81 milliGRAM(s) Oral daily  buDESOnide   0.5 milliGRAM(s) Respule 0.5 milliGRAM(s) Inhalation two times a day  diltiazem    Tablet 30 milliGRAM(s) Oral every 6 hours  doxazosin 8 milliGRAM(s) Oral at bedtime  epoetin nelly Injectable 6000 Unit(s) IV Push <User Schedule>  finasteride 5 milliGRAM(s) Oral daily  heparin  Injectable 5000 Unit(s) SubCutaneous every 8 hours  loratadine 10 milliGRAM(s) Oral daily  meropenem  IVPB 500 milliGRAM(s) IV Intermittent every 24 hours  pantoprazole    Tablet 40 milliGRAM(s) Oral before breakfast  simvastatin 10 milliGRAM(s) Oral at bedtime  vancomycin    Solution 125 milliGRAM(s) Oral every 6 hours  warfarin 3 milliGRAM(s) Oral daily    MEDICATIONS  (PRN):  acetaminophen   Tablet .. 650 milliGRAM(s) Oral every 6 hours PRN Temp greater or equal to 38C (100.4F), Mild Pain (1 - 3)  fentaNYL    Injectable 25 MICROGram(s) IV Push every 5 minutes PRN Moderate Pain (4 - 6)  ondansetron Injectable 4 milliGRAM(s) IV Push once PRN Nausea and/or Vomiting  ondansetron Injectable 4 milliGRAM(s) IV Push every 6 hours PRN Nausea  oxyCODONE    IR 5 milliGRAM(s) Oral once PRN Moderate Pain (4 - 6)  traMADol 50 milliGRAM(s) Oral every 6 hours PRN Moderate Pain (4 - 6)      Allergies    Zosyn (Rash)    Intolerances        SOCIAL HISTORY:NC    FAMILY HISTORY:  Family history of colorectal cancer (Father)  Family history of diabetes mellitus (Mother, Sibling)  Family history of hypertension (Mother)      REVIEW OF SYSTEMS:    CONSTITUTIONAL: No weakness, fevers or chills  EYES/ENT: No visual changes;  No vertigo or throat pain   NECK: No pain or stiffness  RESPIRATORY: No cough, wheezing, hemoptysis; No shortness of breath  CARDIOVASCULAR: No chest pain or palpitations  GENITOURINARY: No dysuria, frequency or hematuria  NEUROLOGICAL: No numbness or weakness  SKIN: No itching, burning, rashes, or lesions   All other review of systems is negative unless indicated above.    Vital Signs Last 24 Hrs  T(C): 36.9 (15 Oct 2018 04:57), Max: 36.9 (14 Oct 2018 12:06)  T(F): 98.4 (15 Oct 2018 04:57), Max: 98.4 (14 Oct 2018 12:06)  HR: 68 (15 Oct 2018 04:57) (66 - 89)  BP: 145/44 (15 Oct 2018 04:57) (129/44 - 145/44)  BP(mean): --  RR: 18 (15 Oct 2018 04:57) (18 - 187)  SpO2: 95% (15 Oct 2018 04:57) (95% - 100%)    PHYSICAL EXAM:    Constitutional: NAD, well-developed  HEENT: EOMI, throat clear  Neck: No LAD, supple  Respiratory: CTA and P  Cardiovascular: S1 and S2, RRR, no M  Gastrointestinal: BS+, soft, NT/ND, neg HSM,  Extremities: No peripheral edema, neg clubing, cyanosis, SP amputation LE  \  Neurological: A/O x 3, no focal deficits  Psychiatric: Normal mood, normal affect  Skin: No rashes    LABS:  CBC Full  -  ( 15 Oct 2018 07:30 )  WBC Count : 5.83 K/uL  Hemoglobin : 8.9 g/dL  Hematocrit : 29.5 %  Platelet Count - Automated : 180 K/uL  Mean Cell Volume : 97.7 fl  Mean Cell Hemoglobin : 29.5 pg  Mean Cell Hemoglobin Concentration : 30.2 gm/dL  Auto Neutrophil # : x  Auto Lymphocyte # : x  Auto Monocyte # : x  Auto Eosinophil # : x  Auto Basophil # : x  Auto Neutrophil % : x  Auto Lymphocyte % : x  Auto Monocyte % : x  Auto Eosinophil % : x  Auto Basophil % : x    10-15    141  |  108  |  55<H>  ----------------------------<  87  4.5   |  20<L>  |  3.39<H>    Ca    8.0<L>      15 Oct 2018 07:30  Phos  8.5     10-15    TPro  x   /  Alb  2.4<L>  /  TBili  x   /  DBili  x   /  AST  x   /  ALT  x   /  AlkPhos  x   10-15    PT/INR - ( 15 Oct 2018 07:30 )   PT: 12.8 sec;   INR: 1.18 ratio             10-07 @ 11:19  Hep A Igm Nonreact  Hep A total ab, IgA and M --  Hep B core Ab total --  Hep B core IgM Nonreact  Hep B DNA PCR --  Hep BSAg --  Hep BSAb --  Hep BSAb --  HCV Ab --  HCV RNA Log --  HCV RNA interp --                RADIOLOGY & ADDITIONAL STUDIES:

## 2018-10-15 NOTE — DISCHARGE NOTE ADULT - PROVIDER TOKENS
TOKEN:'[id number]:'[id number]',FREE:[LAST:[PCP],PHONE:[(   )    -],FAX:[(   )    -]],TOKEN:'[id number]'

## 2018-10-15 NOTE — PROGRESS NOTE ADULT - ASSESSMENT
82 y/o M PMHx significant for CAD s/p stents, AFib, diastolic CHF, hx of upper GI bleeding, PVD, hypertension, hypertension, severe COPD (O2 dependent), SHAYY on bipap at night, ESRD on HD (MWF), recurrent c. diff who was BIBA from dialysis ([facility]) where he was found to have a post-dialysis fever associated w/ tachycardia. In triage => /min, Tmax 102.7'F.   Seen in ER with daughter at bedside  chart reviewed case d/w HD nurses  possible UTI  blood cx done    a/p  ESRD on HD   dialysis via cvc  possible UTI  evaluate for bacteremia as well  duplex of UE for avf    10/7   febrile syndrome  cystitis  on Cefepime IV  Vanco po  HD MWF  vein mapping UE  Dr Clement evaluating for possible avf    10/8 SY  --ARYA/CKD   For now HD dependent at [facility] tx schedule.  Will plan for HD in am.  Creat trending down.   Continue to monitor for renal recovery.  --UTI with resistant Klebsiella.  Started on Meropenem today.  --Plan for AVF once ID cleared and if pt does remain on hd this admission.    10/9  ESBL in urine switched to Meropenem   feels well  no new events  on isolation  4 k bath    10/10 MK  - arya/ckd for next hd on friday, remains hd dependent  - esbl uti: on meropenem  - avf after ID clearance, fu trend of the cr    10/11 SY  --ARYA/CKD  Creat is slowly trending down, however, remains oligoanuric with significant fluid weight gain intradialytic period.  plan to continue BIW HD for now.  Plan for AVF this admission if feasible.  --CHF : well compensated on HD.  Again pt may be dependent of HD to prevent CHF decompensation.  --ESBL UTI : Meropenem until tomorrow.  --Vascular eval for LLE wound and AVF.    10/12  Cystitis, Klebsiella pneumoniae, ESBL  On IV meropenem  Continue dialysis as outlined  For AV fistula placement    10/14 MK  - ARYA/CKD remains HD dependent    s/p avf : monitor on restarting of AC    10/15 MK  - ARYA/CKD remains HD dependent, tolerating hd   - AOCD: epo with hd   - s/p avf : h/h stable post -op  no renal barrier for dc

## 2018-10-15 NOTE — DISCHARGE NOTE ADULT - WEIGHT IN LBS
Care Manager contacted the patient by telephone in follow up. Verified  and zip code with patient as identifiers. Patient states she is feeling much better and is back at work. States she is scheduled to FU with her PCP appointment in one week. States her symptoms have resolved and has no SOB or pain at this time. Declines any additional CM services at this time but states she has my contact information in case she has a question. [phone]

## 2018-10-15 NOTE — PROGRESS NOTE ADULT - SUBJECTIVE AND OBJECTIVE BOX
stable    no hand pain    + thrill (slightly pulsatile)  hand well perfused without steal symptoms    A/P  Patent R brachiocephalic AVF   hand exercises    will defer angiogram of L leg for now    MEDICATIONS  (STANDING):  allopurinol 100 milliGRAM(s) Oral daily  aspirin  chewable 81 milliGRAM(s) Oral daily  buDESOnide   0.5 milliGRAM(s) Respule 0.5 milliGRAM(s) Inhalation two times a day  diltiazem    Tablet 30 milliGRAM(s) Oral every 6 hours  doxazosin 8 milliGRAM(s) Oral at bedtime  epoetin nelly Injectable 6000 Unit(s) IV Push <User Schedule>  finasteride 5 milliGRAM(s) Oral daily  heparin  Injectable 5000 Unit(s) SubCutaneous every 8 hours  loratadine 10 milliGRAM(s) Oral daily  meropenem  IVPB 500 milliGRAM(s) IV Intermittent every 24 hours  pantoprazole    Tablet 40 milliGRAM(s) Oral before breakfast  simvastatin 10 milliGRAM(s) Oral at bedtime  vancomycin    Solution 125 milliGRAM(s) Oral every 6 hours  warfarin 3 milliGRAM(s) Oral daily    MEDICATIONS  (PRN):  acetaminophen   Tablet .. 650 milliGRAM(s) Oral every 6 hours PRN Temp greater or equal to 38C (100.4F), Mild Pain (1 - 3)  fentaNYL    Injectable 25 MICROGram(s) IV Push every 5 minutes PRN Moderate Pain (4 - 6)  ondansetron Injectable 4 milliGRAM(s) IV Push once PRN Nausea and/or Vomiting  ondansetron Injectable 4 milliGRAM(s) IV Push every 6 hours PRN Nausea  oxyCODONE    IR 5 milliGRAM(s) Oral once PRN Moderate Pain (4 - 6)  traMADol 50 milliGRAM(s) Oral every 6 hours PRN Moderate Pain (4 - 6)      Allergies    Zosyn (Rash)    Intolerances        Flatus: [ ] YES [ ] NO             Bowel Movement: [ ] YES [ ] NO  Pain (0-10):            Pain Control Adequate: [ ] YES [ ] NO  Nausea: [ ] YES [ ] NO            Vomiting: [ ] YES [ ] NO  Diarrhea: [ ] YES [ ] NO         Constipation: [ ] YES [ ] NO     Chest Pain: [ ] YES [ ] NO    SOB:  [ ] YES [ ] NO    Vital Signs Last 24 Hrs  T(C): 37.6 (15 Oct 2018 08:01), Max: 37.6 (15 Oct 2018 08:01)  T(F): 99.6 (15 Oct 2018 08:01), Max: 99.6 (15 Oct 2018 08:01)  HR: 75 (15 Oct 2018 09:30) (60 - 89)  BP: 132/55 (15 Oct 2018 09:30) (119/53 - 147/56)  BP(mean): --  RR: 16 (15 Oct 2018 09:30) (16 - 187)  SpO2: 95% (15 Oct 2018 04:57) (95% - 100%)    I&O's Summary    14 Oct 2018 07:01  -  15 Oct 2018 07:00  --------------------------------------------------------  IN: 420 mL / OUT: 0 mL / NET: 420 mL        Physical Exam:  General: NAD, resting comfortably  Pulmonary: normal resp effort, CTA-B  Cardiovascular: NSR  Abdominal: soft, NT/ND  Extremities: WWP, normal strength  Neuro: A/O x 3, CNs II-XII grossly intact, normal motor/sensation, no focal deficits  Pulses:   Right:                                                                          Left:  FEM [ ]2+ [ ]1+ [ ]doppler                                             FEM [ ]2+ [ ]1+ [ ]doppler    POP [ ]2+ [ ]1+ [ ]doppler                                             POP [ ]2+ [ ]1+ [ ]doppler    DP [ ]2+ [ ]1+ [ ]doppler                                                DP [ ]2+ [ ]1+ [ ]doppler  PT[ ]2+ [ ]1+ [ ]doppler                                                  PT [ ]2+ [ ]1+ [ ]doppler    LABS:                        8.9    5.83  )-----------( 180      ( 15 Oct 2018 07:30 )             29.5     10-15    141  |  108  |  55<H>  ----------------------------<  87  4.5   |  20<L>  |  3.39<H>    Ca    8.0<L>      15 Oct 2018 07:30  Phos  8.5     10-15    TPro  x   /  Alb  2.4<L>  /  TBili  x   /  DBili  x   /  AST  x   /  ALT  x   /  AlkPhos  x   10-15    PT/INR - ( 15 Oct 2018 07:30 )   PT: 12.8 sec;   INR: 1.18 ratio             LIVER FUNCTIONS - ( 15 Oct 2018 07:30 )  Alb: 2.4 g/dL / Pro: x     / ALK PHOS: x     / ALT: x     / AST: x     / GGT: x           CAPILLARY BLOOD GLUCOSE          RADIOLOGY & ADDITIONAL TESTS:

## 2018-10-15 NOTE — PROGRESS NOTE ADULT - ASSESSMENT
hypertension, hypertension, severe COPD (O2 dependent), SHAYY on bipap at night, ESRD on HD (MWF), recurrent c. diff who was BIBA from dialysis ([facility]) where he was found to have a post-dialysis fever associated w/ tachycardia. In triage => /min, Tmax 102.7'F. Labs => LA 1.2, BUN/Cr 27/1.59, Alb 2.7, Phos 4.2. UA (+), RVP (-). CT ABD/Pelvis => Trace pericolonic stranding at the level of proximal to mid descending colon. Clinical correlation is advised to assess for mild colitis. No significant wall thickening. Moderate fecal burden. No evidence of small bowel obstruction or extraluminal collection. Re-demonstrated incompletely characterize right hepatic lobe hypodensity during 3 x 1.6 cm for which nonemergent liver protocol MR is advised provided no contraindications. Diffuse bladder wall thickening with trace perivesicular stranding. Correlate with urinalysis to assess for cystitis in appropriate clinical setting. Consider nonemergent retrograde evaluation is concern for bladder malignancy. Uncomplicated cholelithiasis. In the ED the patient was given Fpxcogra6h IVPB x 1, Xeaalpajcx1x IVPB x 1, NS 1.5L x 1.      Patient with likely cystitis/urinary tract infection as cause of his symptoms.  Antibiotics    No evidence of active C. difficile at this time although patient is at high risk of recurrent C. difficile. Would empirically treat with vancomycin 4 times a day for the duration that he is on antibiotics and for 2 weeks afterwards.    Coronary artery disease, atrial fibrillation–rate control. Continue aspirin.    * CAD/AFib  Anticoagulation was held in the past secondary to significant episodes of GI bleeding.  however,   Discussed with hospitalist cardiologist patient and daughter regarding increased risk of bleeding and anticoagulation however, patient also had high risk for strokes.  After long discussion, family and patient agreed with anticoagulation and no evidence of bleeding so far  Would cont Protonix  Anticoagulation management per cardiologist.  Risk of bleeding versus risk of cardio embolic events reviewed with patient again

## 2018-10-15 NOTE — DISCHARGE NOTE ADULT - PLAN OF CARE
prevent recurrence completed antibiotics , follow up with PCP within 1 week started on coumadin 3 mg, check INR in 1-2 days, adjust the dose to keep INR between 2-3  monitor CBC weekly, follow up with cardiologist  within 1-2 weeks  monitor for signs of rectal bleeding s/p right arm AV fistula formation, follow up with vascular surgery within 1 week Dr. Clement for post-operative check follow up with Dr. Clement for chronic left leg vascular ulcers management   local wound care follow up with Dr. Carvalho for further work-up and monitoring

## 2018-10-15 NOTE — PROGRESS NOTE ADULT - SUBJECTIVE AND OBJECTIVE BOX
NEPHROLOGY INTERVAL HPI/OVERNIGHT EVENTS:  doing well, no new c/o for dc today      MEDICATIONS  (STANDING):  allopurinol 100 milliGRAM(s) Oral daily  aspirin  chewable 81 milliGRAM(s) Oral daily  buDESOnide   0.5 milliGRAM(s) Respule 0.5 milliGRAM(s) Inhalation two times a day  diltiazem    Tablet 30 milliGRAM(s) Oral every 6 hours  doxazosin 8 milliGRAM(s) Oral at bedtime  epoetin nelly Injectable 6000 Unit(s) IV Push <User Schedule>  finasteride 5 milliGRAM(s) Oral daily  heparin  Injectable 5000 Unit(s) SubCutaneous every 8 hours  loratadine 10 milliGRAM(s) Oral daily  meropenem  IVPB 500 milliGRAM(s) IV Intermittent every 24 hours  pantoprazole    Tablet 40 milliGRAM(s) Oral before breakfast  simvastatin 10 milliGRAM(s) Oral at bedtime  vancomycin    Solution 125 milliGRAM(s) Oral every 6 hours  warfarin 3 milliGRAM(s) Oral daily    MEDICATIONS  (PRN):  acetaminophen   Tablet .. 650 milliGRAM(s) Oral every 6 hours PRN Temp greater or equal to 38C (100.4F), Mild Pain (1 - 3)  fentaNYL    Injectable 25 MICROGram(s) IV Push every 5 minutes PRN Moderate Pain (4 - 6)  ondansetron Injectable 4 milliGRAM(s) IV Push once PRN Nausea and/or Vomiting  ondansetron Injectable 4 milliGRAM(s) IV Push every 6 hours PRN Nausea  oxyCODONE    IR 5 milliGRAM(s) Oral once PRN Moderate Pain (4 - 6)  traMADol 50 milliGRAM(s) Oral every 6 hours PRN Moderate Pain (4 - 6)      Allergies    Zosyn (Rash)    Intolerances        I&O's Detail    14 Oct 2018 07:01  -  15 Oct 2018 07:00  --------------------------------------------------------  IN:    Oral Fluid: 420 mL  Total IN: 420 mL    OUT:  Total OUT: 0 mL    Total NET: 420 mL          .    Patient was seen and evaluated on dialysis.   Patient is tolerating the procedure well.   Continue dialysis:   Dialyzer:  revaclear 300 with uf goal of 2kg on 2k  bfr 400     Vital Signs Last 24 Hrs  T(C): 37.6 (15 Oct 2018 08:01), Max: 37.6 (15 Oct 2018 08:01)  T(F): 99.6 (15 Oct 2018 08:01), Max: 99.6 (15 Oct 2018 08:01)  HR: 75 (15 Oct 2018 10:01) (60 - 89)  BP: 120/59 (15 Oct 2018 10:01) (119/53 - 147/56)  BP(mean): --  RR: 16 (15 Oct 2018 09:30) (16 - 187)  SpO2: 95% (15 Oct 2018 04:57) (95% - 100%)  Daily     Daily     PHYSICAL EXAM:  General: alert. awake Ox3  HEENT: MMM  CV: s1s2 rrr  LUNGS: B/L CTA  EXT: no edema, rt AC with + thrill, bruit    LABS:                        8.9    5.83  )-----------( 180      ( 15 Oct 2018 07:30 )             29.5     10-15    141  |  108  |  55<H>  ----------------------------<  87  4.5   |  20<L>  |  3.39<H>    Ca    8.0<L>      15 Oct 2018 07:30  Phos  8.5     10-15  TPro  x   /  Alb  2.4<L>  /  TBili  x   /  DBili  x   /  AST  x   /  ALT  x   /  AlkPhos  x   10-15  PT/INR - ( 15 Oct 2018 07:30 )   PT: 12.8 sec;   INR: 1.18 ratio    Phosphorus Level, Serum: 8.5 mg/dL (10-15 @ 07:30)

## 2018-10-15 NOTE — DISCHARGE NOTE ADULT - OTHER SIGNIFICANT FINDINGS
Complete Blood Count in AM (10.15.18 @ 07:30)    Nucleated RBC: 0 /100 WBCs    WBC Count: 5.83 K/uL    RBC Count: 3.02 M/uL    Hemoglobin: 8.9 g/dL    Hematocrit: 29.5 %    Mean Cell Volume: 97.7 fl    Mean Cell Hemoglobin: 29.5 pg    Mean Cell Hemoglobin Conc: 30.2 gm/dL    Red Cell Distrib Width: 15.5 %    Platelet Count - Automated: 180 K/uL    Basic Metabolic Panel in AM (10.15.18 @ 07:30)    Sodium, Serum: 141 mmol/L    Potassium, Serum: 4.5 mmol/L    Chloride, Serum: 108 mmol/L    Carbon Dioxide, Serum: 20 mmol/L    Anion Gap, Serum: 13 mmol/L    Blood Urea Nitrogen, Serum: 55 mg/dL    Creatinine, Serum: 3.39 mg/dL    Glucose, Serum: 87 mg/dL    Calcium, Total Serum: 8.0 mg/dL   Prothrombin Time and INR, Plasma in AM (10.15.18 @ 07:30)    Prothrombin Time, Plasma: 12.8 sec    INR: 1.18 ratio  < from: US Duplex Venous Upper Ext Complete, Bilateral (10.10.18 @ 17:03) >    IMPRESSION:   Measurements of the bilateral upper extremity veins as   provided above. Note nonocclusive thrombus seen within the RIGHT   antecubital cephalic vein.    < end of copied text >  < from: CT Abdomen and Pelvis No Cont (10.05.18 @ 21:36) >  IMPRESSION:    Trace pericolonic stranding at the level of proximal to mid descending   colon. Clinical correlation is advised to assess for mild colitis. No   significant wall thickening. Moderate fecal burden. No evidence of small   bowel obstruction or extraluminal collection.     Redemonstrated incompletely characterize right hepatic lobe hypodensity   during 3 x 1.6 cm for which nonemergent liver protocol MR is advised   provided no contraindications.    Diffuse bladder wall thickening with trace perivesicular stranding.   Correlate with urinalysis to assess for cystitis in appropriate clinical   setting. Consider nonemergent retrograde evaluation is concern for   bladder malignancy.    Uncomplicated cholelithiasis.    Additional findings as detailed above.      < end of copied text >

## 2018-10-15 NOTE — DISCHARGE NOTE ADULT - HOSPITAL COURSE
CC: Fever and palpitations     HPI:     84 y/o M PMHx significant for CAD s/p stents, AFib, diastolic CHF, hx of upper GI bleeding, PVD, hypertension, hypertension, severe COPD (O2 dependent), SHAYY on bipap at night, ESRD on HD (MWF), recurrent c. diff who was BIBA on 10/5/18  from dialysis ([facility]) where he was found to have a post-dialysis fever associated w/ tachycardia. In triage => /min, Tmax 102.7'F. Labs => LA 1.2, BUN/Cr 27/1.59, Alb 2.7, Phos 4.2. UA (+), RVP (-). CT ABD/Pelvis => Trace pericolonic stranding at the level of proximal to mid descending colon. Clinical correlation is advised to assess for mild colitis. No significant wall thickening. Moderate fecal burden. No evidence of small bowel obstruction or extraluminal collection. Re-demonstrated incompletely characterize right hepatic lobe hypodensity during 3 x 1.6 cm for which nonemergent liver protocol MR is advised provided no contraindications. Diffuse bladder wall thickening with trace perivesicular stranding. Correlate with urinalysis to assess for cystitis in appropriate clinical setting. Consider nonemergent retrograde evaluation is concern for bladder malignancy. Uncomplicated cholelithiasis. In the ED the patient was given Ggssnjel4e IVPB x 1, Czqgrsvokn0m IVPB x 1, NS 1.5L x 1.         INTERVAL HPI/ OVERNIGHT EVENTS:    10/7/18 Pt was seen and examined,  reports feeling well no complains. No fevers, no CP or SOB   10/8 /18 pt seen and examined, denies fever, chills, abdominal pain, tolerating PO diet, denies new sx  10/9/18 - pt seen and examined at HD, denies CP, dyspnea, weakness, afebrile  10/10/18 - pt seen and examined, denies fever, chills, + nasal congestion, denies new sx  10/11/18 - pt seen and examined , reports left leg pain, dressing removed feels better , denies fever, chills  10/12/18 - pt seen and examined, denies left leg pain, afebrile, tolerated HD well  10/13 - s/p  right brachiocephalic AVF , tolerated procedure well, pain 6/10, denies new sx, afebrile  10/14 - pt seen and examined, right arm pain better, denies Cp, dyspnea, palpitations, afebrile, no new sx  10/15 - pt seen and examined, reports right arm mild pain, denies Cp, dyspnea, afebrile, tolerating HD , + gen weakness  REVIEW OF SYSTEMS:  All other review of systems is negative unless indicated above.  T(F): 99.5 (10-15-18 @ 17:36), Max: 99.6 (10-15-18 @ 08:01)  HR: 83 (10-15-18 @ 17:36) (60 - 85)  BP: 137/40 (10-15-18 @ 17:36) (119/53 - 147/56)  RR: 18 (10-15-18 @ 17:36) (16 - 187)  SpO2: 96% (10-15-18 @ 17:36) (95% - 100%)   Fevers due to Klebsiella  UTI/ cystitis ESBL, resolving   - fevers resolved   - CT reviewed   - No diarrhea, off isolation   - F/u BCX: NGTD   - UCX: + Klebsiella   - C/w [id number] ->meropenem completed 3 days, d/w Dr. Masha wu for discharge  - On PO Vanco for Cdiff PPxs  - will stop  * CAD/AFib  -  rate control   - c/w  Diltiazem 30mg po q6h  - cont. ASA 81mg po daily  - restarted on coumadin 5 mg, pt/inr daily, will hold 10/12 for now  for AVF as per vascular surgery  -cardiology consult - for risk stratification and choice of  AC   - coumadin restart post-op c/w coumadin 3 mg , daily PT/INR   - repeat cbc in am   - d/w Dr. Carvalho - ok to restart AC with increased risk of bleeding   * Right hepatic lobe hypodensity lesion during 3 x 1.6 cm   - f/u with GI as o/p for further monitoring and work-up   - vs inpatient evaluation left the message to Dr. Carvalho   * BPH  - c/w  Doxazosin 8mg po qHS  - c/w Dutasteride 0.5mg po qHS  * COPD/SHAYY  - stable respiratory status   -CXR: neg   -cont. Budesonide nebs  -cont. supplemental O2 PRN   - BiPAP at night  * ESRD  - C/w HD as per Renal   10/13 - s/p  right brachiocephalic AVF by Dr. Clement  - pain management   *  PAD, s/p R AKA, Chronic LLE ulcers   wound care eval  wound orders as per SX   vascular surgery consult  * Allergic rhinitis  - restart claritin  Disposition - medically optimized to be discharged to [facility] with close follow up with PCP, vascular, renal specialist within 1 week  return to ED if fever, abdominal pain, nausea, vomiting, chest pain, dyspnea  Discharge plan discussed with patient, RN  Patient advised to follow up with PCP within 3-7 days  time spend 40 min

## 2018-10-16 VITALS
OXYGEN SATURATION: 100 % | RESPIRATION RATE: 17 BRPM | SYSTOLIC BLOOD PRESSURE: 141 MMHG | TEMPERATURE: 98 F | DIASTOLIC BLOOD PRESSURE: 74 MMHG | HEART RATE: 87 BPM

## 2018-10-16 LAB
INR BLD: 1.24 RATIO — HIGH (ref 0.88–1.16)
PROTHROM AB SERPL-ACNC: 13.4 SEC — HIGH (ref 9.8–12.7)

## 2018-10-16 RX ORDER — INFLUENZA VIRUS VACCINE 15; 15; 15; 15 UG/.5ML; UG/.5ML; UG/.5ML; UG/.5ML
0.5 SUSPENSION INTRAMUSCULAR ONCE
Qty: 0 | Refills: 0 | Status: COMPLETED | OUTPATIENT
Start: 2018-10-16 | End: 2018-10-16

## 2018-10-16 RX ORDER — VANCOMYCIN HCL 1 G
1 VIAL (EA) INTRAVENOUS
Qty: 56 | Refills: 0 | OUTPATIENT
Start: 2018-10-16 | End: 2018-10-29

## 2018-10-16 RX ORDER — WARFARIN SODIUM 2.5 MG/1
1 TABLET ORAL
Qty: 30 | Refills: 0 | OUTPATIENT
Start: 2018-10-16 | End: 2018-11-14

## 2018-10-16 RX ADMIN — Medication 125 MILLIGRAM(S): at 05:21

## 2018-10-16 RX ADMIN — Medication 81 MILLIGRAM(S): at 11:32

## 2018-10-16 RX ADMIN — Medication 0.5 MILLIGRAM(S): at 07:54

## 2018-10-16 RX ADMIN — PANTOPRAZOLE SODIUM 40 MILLIGRAM(S): 20 TABLET, DELAYED RELEASE ORAL at 05:21

## 2018-10-16 RX ADMIN — FINASTERIDE 5 MILLIGRAM(S): 5 TABLET, FILM COATED ORAL at 11:32

## 2018-10-16 RX ADMIN — HEPARIN SODIUM 5000 UNIT(S): 5000 INJECTION INTRAVENOUS; SUBCUTANEOUS at 05:21

## 2018-10-16 RX ADMIN — Medication 125 MILLIGRAM(S): at 00:17

## 2018-10-16 RX ADMIN — Medication 100 MILLIGRAM(S): at 11:33

## 2018-10-16 RX ADMIN — MEROPENEM 100 MILLIGRAM(S): 1 INJECTION INTRAVENOUS at 14:44

## 2018-10-16 RX ADMIN — LORATADINE 10 MILLIGRAM(S): 10 TABLET ORAL at 11:33

## 2018-10-16 RX ADMIN — HEPARIN SODIUM 5000 UNIT(S): 5000 INJECTION INTRAVENOUS; SUBCUTANEOUS at 14:42

## 2018-10-16 RX ADMIN — Medication 125 MILLIGRAM(S): at 11:33

## 2018-10-16 RX ADMIN — INFLUENZA VIRUS VACCINE 0.5 MILLILITER(S): 15; 15; 15; 15 SUSPENSION INTRAMUSCULAR at 15:43

## 2018-10-16 NOTE — PROGRESS NOTE ADULT - ASSESSMENT
hypertension, hypertension, severe COPD (O2 dependent), SHAYY on bipap at night, ESRD on HD (MWF), recurrent c. diff who was BIBA from dialysis ([facility]) where he was found to have a post-dialysis fever associated w/ tachycardia. In triage => /min, Tmax 102.7'F. Labs => LA 1.2, BUN/Cr 27/1.59, Alb 2.7, Phos 4.2. UA (+), RVP (-). CT ABD/Pelvis => Trace pericolonic stranding at the level of proximal to mid descending colon. Clinical correlation is advised to assess for mild colitis. No significant wall thickening. Moderate fecal burden. No evidence of small bowel obstruction or extraluminal collection. Re-demonstrated incompletely characterize right hepatic lobe hypodensity during 3 x 1.6 cm for which nonemergent liver protocol MR is advised provided no contraindications. Diffuse bladder wall thickening with trace perivesicular stranding. Correlate with urinalysis to assess for cystitis in appropriate clinical setting. Consider nonemergent retrograde evaluation is concern for bladder malignancy. Uncomplicated cholelithiasis. In the ED the patient was given Xmbxcodl0k IVPB x 1, Lpykgugtco3t IVPB x 1, NS 1.5L x 1.    Liver lesion, seen on last CAT scan, reviewed with radiology.  Lesion is unchanged over many years.  Would not follow this further, as it likely represents benign lesion after no change over a decade. No MRI needed at this time.        Patient with likely cystitis/urinary tract infection as cause of his symptoms.  Antibiotics    No evidence of active C. difficile at this time although patient is at high risk of recurrent C. difficile. Would empirically treat with vancomycin 4 times a day for the duration that he is on antibiotics and for 2 weeks afterwards.    Coronary artery disease, atrial fibrillation–rate control. Continue aspirin.    * CAD/AFib  Anticoagulation was held in the past secondary to significant episodes of GI bleeding.  however,   Discussed with hospitalist cardiologist patient and daughter regarding increased risk of bleeding and anticoagulation however, patient also had high risk for strokes.  After long discussion, family and patient agreed with anticoagulation and no evidence of bleeding so far  Would cont Protonix  Anticoagulation management per cardiologist.  Risk of bleeding versus risk of cardio embolic events reviewed with patient again

## 2018-10-16 NOTE — PROGRESS NOTE ADULT - PROVIDER SPECIALTY LIST ADULT
Cardiology
Gastroenterology
Hospitalist
Infectious Disease
Nephrology
Surgery
Surgery
Vascular Surgery
Nephrology
Gastroenterology
Left without being seen (saw a nurse but never saw a physician or midlevel provider)

## 2018-10-16 NOTE — PROGRESS NOTE ADULT - REASON FOR ADMISSION
Fever and palpitations
CRF
Fever and palpitations

## 2018-10-16 NOTE — PROGRESS NOTE ADULT - SUBJECTIVE AND OBJECTIVE BOX
pt seen and examined. remains medically stable for discharge. medication reconciliation adjusted to reflex prophylaxis with oral vancomycin after merrem course.     total time, including coordination of care: 33 minutes, including disucssion with patient and case management  GEN: NAD  EYES: eomi  CV: no edema  RESP: unlabored

## 2018-10-16 NOTE — PROGRESS NOTE ADULT - SUBJECTIVE AND OBJECTIVE BOX
Patient is a 83y old  Male who presents with a chief complaint of Fever and palpitations (15 Oct 2018 18:44)      HPI:  84 y/o M PMHx significant for CAD s/p stents, AFib, diastolic CHF, hx of upper GI bleeding, PVD, hypertension, hypertension, severe COPD (O2 dependent), SHAYY on bipap at night, ESRD on HD (MWF), recurrent c. diff who was BIBA from dialysis ([facility]) where he was found to have a post-dialysis fever associated w/ tachycardia. In triage => /min, Tmax 102.7'F. Labs => LA 1.2, BUN/Cr 27/1.59, Alb 2.7, Phos 4.2. UA (+), RVP (-). CT ABD/Pelvis => Trace pericolonic stranding at the level of proximal to mid descending colon. Clinical correlation is advised to assess for mild colitis. No significant wall thickening. Moderate fecal burden. No evidence of small bowel obstruction or extraluminal collection. Re-demonstrated incompletely characterize right hepatic lobe hypodensity during 3 x 1.6 cm for which nonemergent liver protocol MR is advised provided no contraindications. Diffuse bladder wall thickening with trace perivesicular stranding. Correlate with urinalysis to assess for cystitis in appropriate clinical setting. Consider nonemergent retrograde evaluation is concern for bladder malignancy. Uncomplicated cholelithiasis. In the ED the patient was given [id number] IVPB x 1, Ewiebirjsz8r IVPB x 1, NS 1.5L x 1. (06 Oct 2018 03:43)    Patient tolerating diet well. No diarrhea and no evidence of bleeding.  Liver lesion noted, reviewed with radiology and compared to old CTs/radiographic evaluation. Liver lesion appears to be unchanged over many years, last CAT scan also mentions that he may be cystic        PAST MEDICAL & SURGICAL HISTORY:  Above knee amputation of right lower extremity  Recurrent Clostridium difficile diarrhea  Diastolic CHF  Peripheral vascular disease  Afib  Anemia  CKD (chronic kidney disease)  COPD (chronic obstructive pulmonary disease)  SHAYY (obstructive sleep apnea)  Sepsis, due to unspecified organism: 2/2 poorly healing wounds b/l  Dyspepsia: On moderate exertion.  Sleep apnea, obstructive: Requires home 02 therapy, and treatment with BIPAP  Atelectasis  Pleural effusion, bilateral  Respiratory failure  Peripheral edema  CRI (chronic renal insufficiency)  Gout  Benign prostatic hypertrophy  Spinal stenosis  Hypercholesterolemia  GERD (gastroesophageal reflux disease)  CAD (coronary artery disease)  Hypertension  S/P angioplasty with stent  Cataract of left eye  Prostate: Surgery green light procedure.  S/P rotator cuff surgery: Right  S/P angioplasty      MEDICATIONS  (STANDING):  allopurinol 100 milliGRAM(s) Oral daily  aspirin  chewable 81 milliGRAM(s) Oral daily  buDESOnide   0.5 milliGRAM(s) Respule 0.5 milliGRAM(s) Inhalation two times a day  diltiazem    Tablet 30 milliGRAM(s) Oral every 6 hours  doxazosin 8 milliGRAM(s) Oral at bedtime  epoetin nelly Injectable 6000 Unit(s) IV Push <User Schedule>  finasteride 5 milliGRAM(s) Oral daily  heparin  Injectable 5000 Unit(s) SubCutaneous every 8 hours  loratadine 10 milliGRAM(s) Oral daily  meropenem  IVPB 500 milliGRAM(s) IV Intermittent every 24 hours  pantoprazole    Tablet 40 milliGRAM(s) Oral before breakfast  simvastatin 10 milliGRAM(s) Oral at bedtime  vancomycin    Solution 125 milliGRAM(s) Oral every 6 hours  warfarin 3 milliGRAM(s) Oral daily    MEDICATIONS  (PRN):  acetaminophen   Tablet .. 650 milliGRAM(s) Oral every 6 hours PRN Temp greater or equal to 38C (100.4F), Mild Pain (1 - 3)  fentaNYL    Injectable 25 MICROGram(s) IV Push every 5 minutes PRN Moderate Pain (4 - 6)  ondansetron Injectable 4 milliGRAM(s) IV Push once PRN Nausea and/or Vomiting  ondansetron Injectable 4 milliGRAM(s) IV Push every 6 hours PRN Nausea  oxyCODONE    IR 5 milliGRAM(s) Oral once PRN Moderate Pain (4 - 6)  traMADol 50 milliGRAM(s) Oral every 6 hours PRN Moderate Pain (4 - 6)      Allergies    Zosyn (Rash)    Intolerances        SOCIAL HISTORY:NC    FAMILY HISTORY:  Family history of colorectal cancer (Father)  Family history of diabetes mellitus (Mother, Sibling)  Family history of hypertension (Mother)      REVIEW OF SYSTEMS:    CONSTITUTIONAL: No weakness, fevers or chills  EYES/ENT: No visual changes;  No vertigo or throat pain   NECK: No pain or stiffness  RESPIRATORY: No cough, wheezing, hemoptysis; No shortness of breath  CARDIOVASCULAR: No chest pain or palpitations  GENITOURINARY: No dysuria, frequency or hematuria  NEUROLOGICAL: No numbness or weakness  SKIN: No itching, burning, rashes, or lesions   All other review of systems is negative unless indicated above.    Vital Signs Last 24 Hrs  T(C): 37.5 (16 Oct 2018 04:27), Max: 37.5 (15 Oct 2018 17:36)  T(F): 99.5 (16 Oct 2018 04:27), Max: 99.5 (15 Oct 2018 17:36)  HR: 70 (16 Oct 2018 07:59) (70 - 84)  BP: 136/37 (16 Oct 2018 04:27) (125/51 - 145/55)  BP(mean): --  RR: 18 (16 Oct 2018 04:27) (16 - 18)  SpO2: 91% (16 Oct 2018 07:59) (91% - 98%)    PHYSICAL EXAM:    Constitutional: NAD, well-developed  HEENT: EOMI, throat clear  Neck: No LAD, supple  Respiratory: CTA and P  Cardiovascular: S1 and S2, RRR, no M  Gastrointestinal: BS+, soft, NT/ND, neg HSM,  Extremities: No peripheral edema, neg clubing, cyanosis  SP amputation  Neurological: A/O x 3, no focal deficits  Psychiatric: Normal mood, normal affect  Skin: No rashes    LABS:  CBC Full  -  ( 15 Oct 2018 07:30 )  WBC Count : 5.83 K/uL  Hemoglobin : 8.9 g/dL  Hematocrit : 29.5 %  Platelet Count - Automated : 180 K/uL  Mean Cell Volume : 97.7 fl  Mean Cell Hemoglobin : 29.5 pg  Mean Cell Hemoglobin Concentration : 30.2 gm/dL  Auto Neutrophil # : x  Auto Lymphocyte # : x  Auto Monocyte # : x  Auto Eosinophil # : x  Auto Basophil # : x  Auto Neutrophil % : x  Auto Lymphocyte % : x  Auto Monocyte % : x  Auto Eosinophil % : x  Auto Basophil % : x    10-15    141  |  108  |  55<H>  ----------------------------<  87  4.5   |  20<L>  |  3.39<H>    Ca    8.0<L>      15 Oct 2018 07:30  Phos  8.5     10-15    TPro  x   /  Alb  2.4<L>  /  TBili  x   /  DBili  x   /  AST  x   /  ALT  x   /  AlkPhos  x   10-15    PT/INR - ( 16 Oct 2018 08:27 )   PT: 13.4 sec;   INR: 1.24 ratio             10-07 @ 11:19  Hep A Igm Nonreact  Hep A total ab, IgA and M --  Hep B core Ab total --  Hep B core IgM Nonreact  Hep B DNA PCR --  Hep BSAg --  Hep BSAb --  Hep BSAb --  HCV Ab --  HCV RNA Log --  HCV RNA interp --                RADIOLOGY & ADDITIONAL STUDIES:  < from: CT Abdomen and Pelvis No Cont (10.05.18 @ 21:36) >  EXAM:  CT ABDOMEN AND PELVIS                            *** ADDENDUM 10/16/2018  ***    Comparison is made to CT abdomen/pelvis January 18, 2007. The right   hepatic lobe hypodense lesion is unchanged in size.    IMPRESSION: Hypodense right hepaticlobe lesion is unchanged compared to   January 18, 2007.      *** END OF ADDENDUM 10/16/2018  ***      PROCEDURE DATE:  10/05/2018          INTERPRETATION:  CT ABDOMEN AND PELVIS WITHOUT CONTRAST    INDICATION: Sepsis and abdominal distention.    TECHNIQUE: Abdominopelvic CT without intravenous contrast.Images are   reformatted in the sagittal and coronal planes.    COMPARISON: CT abdomen pelvis 7/21/2018.    FINDINGS:    Absence of intravenous contrast limits evaluation for focal lesions,   neoplasm, and vascular pathology.    Lower Thorax: No consolidation or effusion. Incompletely characterized   oblong right lower lobe opacity which nonemergent CT chest follow-up is   advised for better characterization.    Liver: Redemonstrated incompletely characterize right hepatic lobe   hypodensity during 3 x 1.6 cm on image 19 of series 2 for which   nonemergent liver protocol MR is advised provided no contraindications.  Biliary: No dilatation. Uncomplicated cholelithiasis.  Spleen: No suspicious lesions.      Pancreas: No inflammatory changes or ductal dilatation.      Adrenals: Normal.      Kidneys: No hydronephrosis. Stable scattered renal hypodense and   hyperdense lesions.  Vessels: Normal caliber. Atherosclerotic disease of the aorta and its   branches with associated diffuse dense vascular calcifications.   Infrarenal IVC filter noted. Vascular surgical clips are seen in the   right groin region. Right external iliac artery vascular stent identified.    GI tract: Gastric clips again noted at the fundus. Trace pericolonic   stranding at the level of proximal to mid descending colon. Clinical   correlation is advised to assess for mild colitis.No significant wall   thickening. Moderate fecal burden. No evidence of small bowel   obstruction. Normal-appearing appendix.    Peritoneum/retroperitoneum and mesentery: No free air. No organized fluid   collection. No adenopathy.        Pelvic organs/Bladder: Diffuse bladder wall thickening with trace   perivesicular stranding. Correlate with urinalysis to assess for cystitis   in appropriate clinical setting. Consider nonemergent retrograde   evaluation is concern for bladder malignancy. Heterogenous prostate   containing calcifications.    Abdominal wall: A small umbilical hernia containing portion of   obstructive bowel loop noted.  Bones and soft tissues: Multilevel degenerative changes of the spine   noted stable mild compression deformity of T12 vertebral body.   Degenerative changes of bilateral hip joints with nonspecific lucencies   in the right femoral head. Recommend clinical correlation to assess for   avascular necrosis.    IMPRESSION:    Trace pericolonic stranding at the level of proximal to mid descending   colon. Clinical correlation is advised to assessfor mild colitis.No   significant wall thickening. Moderate fecal burden. No evidence of small   bowel obstruction or extraluminal collection.     Redemonstrated incompletely characterize right hepatic lobe hypodensity   during 3 x 1.6 cm for which nonemergent liver protocol MR is advised   provided no contraindications.    Diffuse bladder wall thickening with trace perivesicular stranding.   Correlate with urinalysis to assess for cystitis in appropriate clinical   setting. Consider nonemergent retrograde evaluation is concern for   bladder malignancy.    Uncomplicated cholelithiasis.    Additional findings as detailed above.                ***Please see the addendum at the top of this report. It may contain   additional important information or changes.****            < end of copied text >

## 2018-10-21 DIAGNOSIS — K80.20 CALCULUS OF GALLBLADDER WITHOUT CHOLECYSTITIS WITHOUT OBSTRUCTION: ICD-10-CM

## 2018-10-21 DIAGNOSIS — E78.5 HYPERLIPIDEMIA, UNSPECIFIED: ICD-10-CM

## 2018-10-21 DIAGNOSIS — N17.9 ACUTE KIDNEY FAILURE, UNSPECIFIED: ICD-10-CM

## 2018-10-21 DIAGNOSIS — Z99.2 DEPENDENCE ON RENAL DIALYSIS: ICD-10-CM

## 2018-10-21 DIAGNOSIS — I25.10 ATHEROSCLEROTIC HEART DISEASE OF NATIVE CORONARY ARTERY WITHOUT ANGINA PECTORIS: ICD-10-CM

## 2018-10-21 DIAGNOSIS — J30.9 ALLERGIC RHINITIS, UNSPECIFIED: ICD-10-CM

## 2018-10-21 DIAGNOSIS — I50.32 CHRONIC DIASTOLIC (CONGESTIVE) HEART FAILURE: ICD-10-CM

## 2018-10-21 DIAGNOSIS — I48.0 PAROXYSMAL ATRIAL FIBRILLATION: ICD-10-CM

## 2018-10-21 DIAGNOSIS — I73.9 PERIPHERAL VASCULAR DISEASE, UNSPECIFIED: ICD-10-CM

## 2018-10-21 DIAGNOSIS — B96.1 KLEBSIELLA PNEUMONIAE [K. PNEUMONIAE] AS THE CAUSE OF DISEASES CLASSIFIED ELSEWHERE: ICD-10-CM
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
## 2018-10-21 DIAGNOSIS — T82.858A STENOSIS OF OTHER VASCULAR PROSTHETIC DEVICES, IMPLANTS AND GRAFTS, INITIAL ENCOUNTER: ICD-10-CM

## 2018-10-21 DIAGNOSIS — I13.2 HYPERTENSIVE HEART AND CHRONIC KIDNEY DISEASE WITH HEART FAILURE AND WITH STAGE 5 CHRONIC KIDNEY DISEASE, OR END STAGE RENAL DISEASE: ICD-10-CM

## 2018-10-21 DIAGNOSIS — N40.0 BENIGN PROSTATIC HYPERPLASIA WITHOUT LOWER URINARY TRACT SYMPTOMS: ICD-10-CM

## 2018-10-21 DIAGNOSIS — M10.9 GOUT, UNSPECIFIED: ICD-10-CM

## 2018-10-21 DIAGNOSIS — N30.90 CYSTITIS, UNSPECIFIED WITHOUT HEMATURIA: ICD-10-CM

## 2018-10-21 DIAGNOSIS — Z89.611 ACQUIRED ABSENCE OF RIGHT LEG ABOVE KNEE: ICD-10-CM

## 2018-10-21 DIAGNOSIS — K21.9 GASTRO-ESOPHAGEAL REFLUX DISEASE WITHOUT ESOPHAGITIS: ICD-10-CM

## 2018-10-21 DIAGNOSIS — L97.229 NON-PRESSURE CHRONIC ULCER OF LEFT CALF WITH UNSPECIFIED SEVERITY: ICD-10-CM

## 2018-10-21 DIAGNOSIS — N18.6 END STAGE RENAL DISEASE: ICD-10-CM

## 2018-10-28 ENCOUNTER — INPATIENT (INPATIENT)
Facility: HOSPITAL | Age: 83
LOS: 3 days | Discharge: NURSING, REHAB OR LONG-TERM CARE | End: 2018-11-01
Attending: FAMILY MEDICINE | Admitting: FAMILY MEDICINE
Payer: MEDICARE

## 2018-10-28 VITALS
RESPIRATION RATE: 16 BRPM | DIASTOLIC BLOOD PRESSURE: 42 MMHG | SYSTOLIC BLOOD PRESSURE: 121 MMHG | HEART RATE: 104 BPM | WEIGHT: 145.06 LBS | TEMPERATURE: 99 F | OXYGEN SATURATION: 99 % | HEIGHT: 67 IN

## 2018-10-28 DIAGNOSIS — Z95.9 PRESENCE OF CARDIAC AND VASCULAR IMPLANT AND GRAFT, UNSPECIFIED: Chronic | ICD-10-CM

## 2018-10-28 LAB
ALBUMIN SERPL ELPH-MCNC: 2.5 G/DL — LOW (ref 3.3–5)
ALP SERPL-CCNC: 69 U/L — SIGNIFICANT CHANGE UP (ref 40–120)
ALT FLD-CCNC: 25 U/L — SIGNIFICANT CHANGE UP (ref 12–78)
ANION GAP SERPL CALC-SCNC: 12 MMOL/L — SIGNIFICANT CHANGE UP (ref 5–17)
APTT BLD: 39.4 SEC — HIGH (ref 27.5–37.4)
AST SERPL-CCNC: 11 U/L — LOW (ref 15–37)
BASOPHILS # BLD AUTO: 0.04 K/UL — SIGNIFICANT CHANGE UP (ref 0–0.2)
BASOPHILS NFR BLD AUTO: 0.5 % — SIGNIFICANT CHANGE UP (ref 0–2)
BILIRUB SERPL-MCNC: 0.3 MG/DL — SIGNIFICANT CHANGE UP (ref 0.2–1.2)
BUN SERPL-MCNC: 62 MG/DL — HIGH (ref 7–23)
CALCIUM SERPL-MCNC: 8.3 MG/DL — LOW (ref 8.5–10.1)
CHLORIDE SERPL-SCNC: 107 MMOL/L — SIGNIFICANT CHANGE UP (ref 96–108)
CK SERPL-CCNC: 47 U/L — SIGNIFICANT CHANGE UP (ref 26–308)
CO2 SERPL-SCNC: 22 MMOL/L — SIGNIFICANT CHANGE UP (ref 22–31)
CREAT SERPL-MCNC: 3.84 MG/DL — HIGH (ref 0.5–1.3)
EOSINOPHIL # BLD AUTO: 0.11 K/UL — SIGNIFICANT CHANGE UP (ref 0–0.5)
EOSINOPHIL NFR BLD AUTO: 1.3 % — SIGNIFICANT CHANGE UP (ref 0–6)
GLUCOSE SERPL-MCNC: 113 MG/DL — HIGH (ref 70–99)
HCT VFR BLD CALC: 31.4 % — LOW (ref 39–50)
HGB BLD-MCNC: 9.6 G/DL — LOW (ref 13–17)
IMM GRANULOCYTES NFR BLD AUTO: 0.5 % — SIGNIFICANT CHANGE UP (ref 0–1.5)
INR BLD: 2.29 RATIO — HIGH (ref 0.88–1.16)
LYMPHOCYTES # BLD AUTO: 0.64 K/UL — LOW (ref 1–3.3)
LYMPHOCYTES # BLD AUTO: 7.5 % — LOW (ref 13–44)
MCHC RBC-ENTMCNC: 29.3 PG — SIGNIFICANT CHANGE UP (ref 27–34)
MCHC RBC-ENTMCNC: 30.6 GM/DL — LOW (ref 32–36)
MCV RBC AUTO: 95.7 FL — SIGNIFICANT CHANGE UP (ref 80–100)
MONOCYTES # BLD AUTO: 1.01 K/UL — HIGH (ref 0–0.9)
MONOCYTES NFR BLD AUTO: 11.9 % — SIGNIFICANT CHANGE UP (ref 2–14)
NEUTROPHILS # BLD AUTO: 6.65 K/UL — SIGNIFICANT CHANGE UP (ref 1.8–7.4)
NEUTROPHILS NFR BLD AUTO: 78.3 % — HIGH (ref 43–77)
NRBC # BLD: 0 /100 WBCS — SIGNIFICANT CHANGE UP (ref 0–0)
PLATELET # BLD AUTO: 225 K/UL — SIGNIFICANT CHANGE UP (ref 150–400)
POTASSIUM SERPL-MCNC: 4.2 MMOL/L — SIGNIFICANT CHANGE UP (ref 3.5–5.3)
POTASSIUM SERPL-SCNC: 4.2 MMOL/L — SIGNIFICANT CHANGE UP (ref 3.5–5.3)
PROT SERPL-MCNC: 6.6 GM/DL — SIGNIFICANT CHANGE UP (ref 6–8.3)
PROTHROM AB SERPL-ACNC: 25.2 SEC — HIGH (ref 9.8–12.7)
RBC # BLD: 3.28 M/UL — LOW (ref 4.2–5.8)
RBC # FLD: 14.6 % — HIGH (ref 10.3–14.5)
SODIUM SERPL-SCNC: 141 MMOL/L — SIGNIFICANT CHANGE UP (ref 135–145)
TROPONIN I SERPL-MCNC: <0.015 NG/ML — SIGNIFICANT CHANGE UP (ref 0.01–0.04)
TROPONIN I SERPL-MCNC: <0.015 NG/ML — SIGNIFICANT CHANGE UP (ref 0.01–0.04)
WBC # BLD: 8.49 K/UL — SIGNIFICANT CHANGE UP (ref 3.8–10.5)
WBC # FLD AUTO: 8.49 K/UL — SIGNIFICANT CHANGE UP (ref 3.8–10.5)

## 2018-10-28 PROCEDURE — 71045 X-RAY EXAM CHEST 1 VIEW: CPT | Mod: 26

## 2018-10-28 PROCEDURE — 99291 CRITICAL CARE FIRST HOUR: CPT

## 2018-10-28 PROCEDURE — 93010 ELECTROCARDIOGRAM REPORT: CPT

## 2018-10-28 RX ORDER — CHOLESTYRAMINE 4 G/9G
4 POWDER, FOR SUSPENSION ORAL DAILY
Qty: 0 | Refills: 0 | Status: DISCONTINUED | OUTPATIENT
Start: 2018-10-28 | End: 2018-11-01

## 2018-10-28 RX ORDER — ALLOPURINOL 300 MG
100 TABLET ORAL DAILY
Qty: 0 | Refills: 0 | Status: DISCONTINUED | OUTPATIENT
Start: 2018-10-28 | End: 2018-11-01

## 2018-10-28 RX ORDER — PANTOPRAZOLE SODIUM 20 MG/1
40 TABLET, DELAYED RELEASE ORAL
Qty: 0 | Refills: 0 | Status: DISCONTINUED | OUTPATIENT
Start: 2018-10-28 | End: 2018-11-01

## 2018-10-28 RX ORDER — WARFARIN SODIUM 2.5 MG/1
3 TABLET ORAL AT BEDTIME
Qty: 0 | Refills: 0 | Status: DISCONTINUED | OUTPATIENT
Start: 2018-10-29 | End: 2018-11-01

## 2018-10-28 RX ORDER — WARFARIN SODIUM 2.5 MG/1
3 TABLET ORAL AT BEDTIME
Qty: 0 | Refills: 0 | Status: DISCONTINUED | OUTPATIENT
Start: 2018-10-28 | End: 2018-10-28

## 2018-10-28 RX ORDER — ASPIRIN/CALCIUM CARB/MAGNESIUM 324 MG
81 TABLET ORAL DAILY
Qty: 0 | Refills: 0 | Status: DISCONTINUED | OUTPATIENT
Start: 2018-10-28 | End: 2018-11-01

## 2018-10-28 RX ORDER — CHOLECALCIFEROL (VITAMIN D3) 125 MCG
1000 CAPSULE ORAL DAILY
Qty: 0 | Refills: 0 | Status: DISCONTINUED | OUTPATIENT
Start: 2018-10-28 | End: 2018-11-01

## 2018-10-28 RX ORDER — ACETAMINOPHEN 500 MG
650 TABLET ORAL EVERY 6 HOURS
Qty: 0 | Refills: 0 | Status: DISCONTINUED | OUTPATIENT
Start: 2018-10-28 | End: 2018-11-01

## 2018-10-28 RX ORDER — LORATADINE 10 MG/1
10 TABLET ORAL DAILY
Qty: 0 | Refills: 0 | Status: DISCONTINUED | OUTPATIENT
Start: 2018-10-28 | End: 2018-11-01

## 2018-10-28 RX ORDER — FINASTERIDE 5 MG/1
5 TABLET, FILM COATED ORAL DAILY
Qty: 0 | Refills: 0 | Status: DISCONTINUED | OUTPATIENT
Start: 2018-10-28 | End: 2018-11-01

## 2018-10-28 RX ORDER — RANOLAZINE 500 MG/1
500 TABLET, FILM COATED, EXTENDED RELEASE ORAL
Qty: 0 | Refills: 0 | Status: DISCONTINUED | OUTPATIENT
Start: 2018-10-28 | End: 2018-11-01

## 2018-10-28 RX ORDER — FLUTICASONE PROPIONATE 50 MCG
1 SPRAY, SUSPENSION NASAL
Qty: 0 | Refills: 0 | Status: DISCONTINUED | OUTPATIENT
Start: 2018-10-28 | End: 2018-11-01

## 2018-10-28 RX ORDER — VANCOMYCIN HCL 1 G
125 VIAL (EA) INTRAVENOUS EVERY 6 HOURS
Qty: 0 | Refills: 0 | Status: COMPLETED | OUTPATIENT
Start: 2018-10-28 | End: 2018-10-29

## 2018-10-28 RX ORDER — SIMVASTATIN 20 MG/1
10 TABLET, FILM COATED ORAL AT BEDTIME
Qty: 0 | Refills: 0 | Status: DISCONTINUED | OUTPATIENT
Start: 2018-10-28 | End: 2018-10-29

## 2018-10-28 RX ORDER — DOXAZOSIN MESYLATE 4 MG
8 TABLET ORAL AT BEDTIME
Qty: 0 | Refills: 0 | Status: DISCONTINUED | OUTPATIENT
Start: 2018-10-28 | End: 2018-11-01

## 2018-10-28 RX ORDER — BUDESONIDE, MICRONIZED 100 %
0.5 POWDER (GRAM) MISCELLANEOUS
Qty: 0 | Refills: 0 | Status: DISCONTINUED | OUTPATIENT
Start: 2018-10-28 | End: 2018-11-01

## 2018-10-28 RX ADMIN — RANOLAZINE 500 MILLIGRAM(S): 500 TABLET, FILM COATED, EXTENDED RELEASE ORAL at 21:15

## 2018-10-28 RX ADMIN — Medication 8 MILLIGRAM(S): at 21:01

## 2018-10-28 RX ADMIN — SIMVASTATIN 10 MILLIGRAM(S): 20 TABLET, FILM COATED ORAL at 21:01

## 2018-10-28 RX ADMIN — Medication 125 MILLIGRAM(S): at 21:55

## 2018-10-28 NOTE — H&P ADULT - ASSESSMENT
83 y.o. male PMH CAD s/p stents, AFib, diastolic CHF, hx of upper GI bleeding, PVD, hypertension, severe COPD (O2 dependent), SHAYY on bipap at night, ESRD on HD (MWF), recurrent c. diff presents with chest pain since this AM.    #chest pain with EKG changes, ?STEMI  #AFib on coumadin  #CAD s/p stents  -admit to CCU  -pt s/p LHC by Dr Wolff  -recommended start on ranexa  -cont coumadin  -cont home meds  -cardio consult, Dr Sosa    #chronic diastolic CHF  -I/O, daily weight    #COPD on O2  -supplemental oxygen and pulse ox monitoring    #ESRD on HD MWF  -pt with recent right arm AV fistula placement  -renal consult    #c diff infection  -was previously admitted and discharged on vanco PO  -per d/c, last day 10/29    #left leg wound  -wound consult    #DVT ppx  -cont coumadin 83 y.o. male PMH CAD s/p stents, AFib, diastolic CHF, hx of upper GI bleeding, PVD, hypertension, severe COPD (O2 dependent), SHAYY on bipap at night, ESRD on HD (MWF), recurrent c. diff presents with chest pain since this AM.    #chest pain with EKG changes, ?STEMI  #AFib on coumadin  #CAD s/p stents  -admit to CCU  -pt s/p LHC by Dr Wolff  -recommended start on ranexa  -cont coumadin  -cont home meds  -cardio consult, Dr Sosa    #chronic diastolic CHF  -I/O, daily weight    #COPD on O2  -supplemental oxygen and pulse ox monitoring    #ESRD on HD MWF  -pt with recent right arm AV fistula placement  -renal consult    #c diff infection  -was previously admitted and discharged on vanco PO  -per d/c, last day 10/29    #left leg wound  -wound consult    #DVT ppx  -cont coumadin    IMPROVE VTE Individual Risk Assessment    RISK                                                                Points    [  ] Previous VTE                                                  3    [  ] Thrombophilia                                               2    [  ] Lower limb paralysis                                      2        (unable to hold up >15 seconds)      [  ] Current Cancer                                              2         (within 6 months)    [ x ] Immobilization > 24 hrs                                1    [ x ] ICU/CCU stay > 24 hours                              1    [ x ] Age > 60                                                      1    IMPROVE VTE Score _____3____

## 2018-10-28 NOTE — CONSULT NOTE ADULT - SUBJECTIVE AND OBJECTIVE BOX
Patient is a 83y old  Male who presents with a chief complaint of chest discomfort started this morning, on and off getting worse this afternoon.  In ER ST changes were noted in the II/III/ and AVF  patient was taken to cath lab, [facility] revealing patent LAD LCx and RCA with patent stents, moderate ISR in RCA stent.    HPI:      PAST MEDICAL & SURGICAL HISTORY:  Above knee amputation of right lower extremity  Recurrent Clostridium difficile diarrhea  Diastolic CHF  Peripheral vascular disease  Afib  Anemia  CKD (chronic kidney disease)  COPD (chronic obstructive pulmonary disease)  SHAYY (obstructive sleep apnea)  Sepsis, due to unspecified organism: 2/2 poorly healing wounds b/l  Dyspepsia: On moderate exertion.  Sleep apnea, obstructive: Requires home 02 therapy, and treatment with BIPAP  Atelectasis  Pleural effusion, bilateral  Respiratory failure  Peripheral edema  CRI (chronic renal insufficiency)  Gout  Benign prostatic hypertrophy  Spinal stenosis  Hypercholesterolemia  GERD (gastroesophageal reflux disease)  CAD (coronary artery disease)  Hypertension  S/P angioplasty with stent  Cataract of left eye  Prostate: Surgery green light procedure.  S/P rotator cuff surgery: Right  S/P angioplasty      HPI:                PREVIOUS DIAGNOSTIC TESTING:      ECHO  FINDINGS:    STRESS  FINDINGS:    CATHETERIZATION  FINDINGS:    MEDICATIONS  (STANDING):    MEDICATIONS  (PRN):      FAMILY HISTORY:  Family history of colorectal cancer (Father)  Family history of diabetes mellitus (Mother, Sibling)  Family history of hypertension (Mother)      SOCIAL HISTORY:    CIGARETTES:    ALCOHOL:            Vital Signs Last 24 Hrs  T(C): 37.3 (28 Oct 2018 16:20), Max: 37.3 (28 Oct 2018 16:20)  T(F): 99.2 (28 Oct 2018 16:20), Max: 99.2 (28 Oct 2018 16:20)  HR: 104 (28 Oct 2018 16:20) (104 - 104)  BP: 121/42 (28 Oct 2018 16:20) (121/42 - 121/42)  BP(mean): --  RR: 16 (28 Oct 2018 16:20) (16 - 16)  SpO2: 99% (28 Oct 2018 16:20) (99% - 99%)    PHYSICAL EXAM-    Constitutional: The patient is on mild discomfort. The patient is alert.     Head: Head is normocephalic and atraumatic.      Neck: The patient's neck is supple without enlargement, has no palpable thyromegaly nor thyroid nodules and has no jugular venous distention. No audible carotid bruits. There are strong carotid pulses bilaterally. No JVD.     Cardiovascular: Regular rate and rhythm without S3, S4. systolic murmur    Respiratory:  The patient has no rales and no rhonchi. The patient has no wheezes, HD catheter.     Abdomen: Soft, nontender, nondistended with positive bowel sounds.      Extremity: Amputated R leg, Fistula on R arm           INTERPRETATION OF TELEMETRY:    ECG:    I&O's Detail      LABS:                        9.6    8.49  )-----------( 225      ( 28 Oct 2018 16:24 )             31.4     10-28    141  |  107  |  62<H>  ----------------------------<  113<H>  4.2   |  22  |  3.84<H>    Ca    8.3<L>      28 Oct 2018 16:24    TPro  6.6  /  Alb  2.5<L>  /  TBili  0.3  /  DBili  x   /  AST  11<L>  /  ALT  25  /  AlkPhos  69  10-28    CARDIAC MARKERS ( 28 Oct 2018 16:24 )  <0.015 ng/mL / x     / 47 U/L / x     / x          PT/INR - ( 28 Oct 2018 16:24 )   PT: 25.2 sec;   INR: 2.29 ratio         PTT - ( 28 Oct 2018 16:24 )  PTT:39.4 sec    I&O's Summary    BNP  RADIOLOGY & ADDITIONAL STUDIES:

## 2018-10-28 NOTE — ED PROVIDER NOTE - OBJECTIVE STATEMENT
84 y/o male with a PMHx of HTN, HLD, CAD s/p stent, BPH, Afib, CKD on HD (MF), COPD, s/p right leg BKA, s/p fistula surgery 2 weeks ago presents to the ED BIBEMS c/o intermittent chest pain at 6:00 today. Pt received ASA 325mg and 0.4mg nitro in route by EMS. No chest pain at time of exam. No nausea, vomiting, diarrhea. Pt receives 1 liter of fluid daily. Started on coumadin 2 weeks ago. On ASA daily. Cardio: Dr. Simons. Vascular: Dr. Shaffer. Pulm: Dr. Maxwell.

## 2018-10-28 NOTE — ED ADULT NURSE REASSESSMENT NOTE - NS ED NURSE REASSESS COMMENT FT1
MD Wolff, cardiologist, arrived at pt bedside at this time, is at pt bedside evaluating pt at this time. Pt remains on bedside monitor and ZOLLS at this time.

## 2018-10-28 NOTE — ED PROVIDER NOTE - MUSCULOSKELETAL, MLM
Spine appears normal, range of motion is not limited, no muscle or joint tenderness. Right leg s/p BKA. Left leg: +1 edema, currently in wrapped/boot for ulcer. Spine appears normal, range of motion is not limited, no muscle or joint tenderness. Right leg s/p AKA. Left leg: +1 edema, currently in wrapped/boot for ulcer.

## 2018-10-28 NOTE — H&P ADULT - NSHPPHYSICALEXAM_GEN_ALL_CORE
Vital Signs Last 24 Hrs  T(C): 37.3 (28 Oct 2018 16:20), Max: 37.3 (28 Oct 2018 16:20)  T(F): 99.2 (28 Oct 2018 16:20), Max: 99.2 (28 Oct 2018 16:20)  HR: 97 (28 Oct 2018 19:00) (88 - 104)  BP: 167/62 (28 Oct 2018 19:00) (121/42 - 181/61)  BP(mean): 91 (28 Oct 2018 19:00) (80 - 97)  RR: 20 (28 Oct 2018 19:00) (16 - 27)  SpO2: 100% (28 Oct 2018 19:00) (99% - 100%)    GEN: appears comfortable  Neuro: Alert, conversant, CN grossly nonfocal  HEENT: NC/AT, EOMI  Neck: no thyroidmegaly, no JVD  Cardiovascular: S1S2 present, regular rhythm, II/VI systolic murmur  Respiratory: breath sounds normal bilaterally, no wheezing, no rales, +rhonchi  Gastrointestinal: bowel sounds normal, soft, no abdominal tenderness  Musculoskeletal: no muscle tenderness  Extremities: right leg amputation  Skin: No rash, left leg wound

## 2018-10-28 NOTE — H&P ADULT - HISTORY OF PRESENT ILLNESS
presents with chest pain since this AM. Pt reports sharp, substernal, nonradiating, moderate intensity, on and off. Pt denies fever, chills, SOB, abd pain, dysuria, or diarrhea. Pt was found to have STEMI inferior lead. Pt was taken to cardiac cath for LHC and without any occlusion. Dr Wolff's note noted. Pt reports continued pain since the cath. 83 y.o. male PMH CAD s/p stents, AFib, diastolic CHF, hx of upper GI bleeding, PVD, hypertension, severe COPD (O2 dependent), SHAYY on bipap at night, ESRD on HD (MWF), recurrent c. diff presents with chest pain since this AM. Pt reports sharp, substernal, nonradiating, moderate intensity, on and off. Pt denies fever, chills, SOB, abd pain, dysuria, or diarrhea. Pt was found to have STEMI inferior lead. Pt was taken to cardiac cath for LHC and without any occlusion. Dr Wolff's note noted. Pt reports continued pain since the cath.

## 2018-10-28 NOTE — ED ADULT TRIAGE NOTE - CHIEF COMPLAINT QUOTE
Pt presents to the ED with complaints of chest pain that woke him at 7AM today. Pt was given a full ASA 325mg in route as well as 0.4mg nitro. IV inserted in L hand via EMS in route.

## 2018-10-28 NOTE — ED PROVIDER NOTE - NS_ ATTENDINGSCRIBEDETAILS _ED_A_ED_FT
Alexx Campbell DO (Attending): The history, relevant review of systems, past medical and surgical history, medical decision making, and physical examination was documented by the scribe in my presence and I attest to the accuracy of the documentation.

## 2018-10-28 NOTE — ED PROVIDER NOTE - MEDICAL DECISION MAKING DETAILS
Pt with intermittent active chest pain. Acute STEMI on EKG. Called PCI team, Dr. Wolff and he is currently actively catheterizing another pt. EKG brought to him and visualized at 16:31. Dr. Wolff to come down immediately after completion of his procedure.

## 2018-10-28 NOTE — ED PROVIDER NOTE - FAMILY HISTORY
Mother  Still living? Unknown  Family history of hypertension, Age at diagnosis: Age Unknown  Family history of diabetes mellitus, Age at diagnosis: Age Unknown     Sibling  Still living? Unknown  Family history of diabetes mellitus, Age at diagnosis: Age Unknown     Father  Still living? Unknown  Family history of colorectal cancer, Age at diagnosis: Age Unknown

## 2018-10-29 PROBLEM — Z89.611 ACQUIRED ABSENCE OF RIGHT LEG ABOVE KNEE: Chronic | Status: ACTIVE | Noted: 2018-10-05

## 2018-10-29 PROBLEM — A04.71 ENTEROCOLITIS DUE TO CLOSTRIDIUM DIFFICILE, RECURRENT: Chronic | Status: ACTIVE | Noted: 2018-10-05

## 2018-10-29 LAB
ANION GAP SERPL CALC-SCNC: 13 MMOL/L — SIGNIFICANT CHANGE UP (ref 5–17)
BUN SERPL-MCNC: 69 MG/DL — HIGH (ref 7–23)
CALCIUM SERPL-MCNC: 8.3 MG/DL — LOW (ref 8.5–10.1)
CHLORIDE SERPL-SCNC: 107 MMOL/L — SIGNIFICANT CHANGE UP (ref 96–108)
CO2 SERPL-SCNC: 20 MMOL/L — LOW (ref 22–31)
CREAT SERPL-MCNC: 3.68 MG/DL — HIGH (ref 0.5–1.3)
CRP SERPL-MCNC: 22.84 MG/DL — HIGH (ref 0–0.4)
ERYTHROCYTE [SEDIMENTATION RATE] IN BLOOD: 91 MM/HR — HIGH (ref 0–20)
GLUCOSE SERPL-MCNC: 85 MG/DL — SIGNIFICANT CHANGE UP (ref 70–99)
HAV IGM SER-ACNC: SIGNIFICANT CHANGE UP
HBV CORE IGM SER-ACNC: SIGNIFICANT CHANGE UP
HBV SURFACE AG SER-ACNC: SIGNIFICANT CHANGE UP
HCT VFR BLD CALC: 29.5 % — LOW (ref 39–50)
HCV AB S/CO SERPL IA: 0.13 S/CO — SIGNIFICANT CHANGE UP
HCV AB SERPL-IMP: SIGNIFICANT CHANGE UP
HGB BLD-MCNC: 9.2 G/DL — LOW (ref 13–17)
INR BLD: 2.54 RATIO — HIGH (ref 0.88–1.16)
MCHC RBC-ENTMCNC: 29.2 PG — SIGNIFICANT CHANGE UP (ref 27–34)
MCHC RBC-ENTMCNC: 31.2 GM/DL — LOW (ref 32–36)
MCV RBC AUTO: 93.7 FL — SIGNIFICANT CHANGE UP (ref 80–100)
NRBC # BLD: 0 /100 WBCS — SIGNIFICANT CHANGE UP (ref 0–0)
PLATELET # BLD AUTO: 196 K/UL — SIGNIFICANT CHANGE UP (ref 150–400)
POTASSIUM SERPL-MCNC: 4.2 MMOL/L — SIGNIFICANT CHANGE UP (ref 3.5–5.3)
POTASSIUM SERPL-SCNC: 4.2 MMOL/L — SIGNIFICANT CHANGE UP (ref 3.5–5.3)
PROTHROM AB SERPL-ACNC: 28 SEC — HIGH (ref 9.8–12.7)
RBC # BLD: 3.15 M/UL — LOW (ref 4.2–5.8)
RBC # FLD: 14.7 % — HIGH (ref 10.3–14.5)
RHEUMATOID FACT SERPL-ACNC: 16 IU/ML — HIGH (ref 0–13)
SODIUM SERPL-SCNC: 140 MMOL/L — SIGNIFICANT CHANGE UP (ref 135–145)
TROPONIN I SERPL-MCNC: 0.03 NG/ML — SIGNIFICANT CHANGE UP (ref 0.01–0.04)
URATE SERPL-MCNC: 5.3 MG/DL — SIGNIFICANT CHANGE UP (ref 3.4–8.8)
WBC # BLD: 8.02 K/UL — SIGNIFICANT CHANGE UP (ref 3.8–10.5)
WBC # FLD AUTO: 8.02 K/UL — SIGNIFICANT CHANGE UP (ref 3.8–10.5)

## 2018-10-29 PROCEDURE — 93010 ELECTROCARDIOGRAM REPORT: CPT

## 2018-10-29 RX ORDER — ERYTHROPOIETIN 10000 [IU]/ML
3000 INJECTION, SOLUTION INTRAVENOUS; SUBCUTANEOUS
Qty: 0 | Refills: 0 | Status: DISCONTINUED | OUTPATIENT
Start: 2018-10-29 | End: 2018-11-01

## 2018-10-29 RX ORDER — ERYTHROPOIETIN 10000 [IU]/ML
12000 INJECTION, SOLUTION INTRAVENOUS; SUBCUTANEOUS
Qty: 0 | Refills: 0 | Status: DISCONTINUED | OUTPATIENT
Start: 2018-10-29 | End: 2018-11-01

## 2018-10-29 RX ORDER — DILTIAZEM HCL 120 MG
5 CAPSULE, EXT RELEASE 24 HR ORAL
Qty: 125 | Refills: 0 | Status: DISCONTINUED | OUTPATIENT
Start: 2018-10-29 | End: 2018-10-30

## 2018-10-29 RX ADMIN — Medication 5 MG/HR: at 22:13

## 2018-10-29 RX ADMIN — Medication 0.5 MILLIGRAM(S): at 21:11

## 2018-10-29 RX ADMIN — RANOLAZINE 500 MILLIGRAM(S): 500 TABLET, FILM COATED, EXTENDED RELEASE ORAL at 06:34

## 2018-10-29 RX ADMIN — Medication 0.5 MILLIGRAM(S): at 10:26

## 2018-10-29 RX ADMIN — Medication 5 MG/HR: at 14:17

## 2018-10-29 RX ADMIN — Medication 81 MILLIGRAM(S): at 16:06

## 2018-10-29 RX ADMIN — CHOLESTYRAMINE 4 GRAM(S): 4 POWDER, FOR SUSPENSION ORAL at 16:05

## 2018-10-29 RX ADMIN — Medication 100 MILLIGRAM(S): at 16:06

## 2018-10-29 RX ADMIN — Medication 125 MILLIGRAM(S): at 21:53

## 2018-10-29 RX ADMIN — PANTOPRAZOLE SODIUM 40 MILLIGRAM(S): 20 TABLET, DELAYED RELEASE ORAL at 06:34

## 2018-10-29 RX ADMIN — Medication 125 MILLIGRAM(S): at 01:00

## 2018-10-29 RX ADMIN — Medication 8 MILLIGRAM(S): at 21:53

## 2018-10-29 RX ADMIN — RANOLAZINE 500 MILLIGRAM(S): 500 TABLET, FILM COATED, EXTENDED RELEASE ORAL at 19:03

## 2018-10-29 RX ADMIN — Medication 125 MILLIGRAM(S): at 06:44

## 2018-10-29 RX ADMIN — Medication 650 MILLIGRAM(S): at 01:17

## 2018-10-29 RX ADMIN — Medication 125 MILLIGRAM(S): at 16:09

## 2018-10-29 RX ADMIN — LORATADINE 10 MILLIGRAM(S): 10 TABLET ORAL at 16:07

## 2018-10-29 RX ADMIN — Medication 650 MILLIGRAM(S): at 02:00

## 2018-10-29 RX ADMIN — WARFARIN SODIUM 3 MILLIGRAM(S): 2.5 TABLET ORAL at 21:55

## 2018-10-29 RX ADMIN — Medication 1000 UNIT(S): at 16:08

## 2018-10-29 RX ADMIN — FINASTERIDE 5 MILLIGRAM(S): 5 TABLET, FILM COATED ORAL at 16:07

## 2018-10-29 NOTE — PROGRESS NOTE ADULT - ASSESSMENT
A/P    #chest pain with EKG changes  -s/p cath - did not require any stent during this time   -ranexa has been added   -ct to monitor pt closely in CCU     #AFib on coumadin- ct coumadin as per INR     #CAD s/p stents      #chronic diastolic CHF  -I/O, daily weight    #COPD on O2  -supplemental oxygen and pulse ox monitoring    #ESRD on HD MWF  -pt with recent right arm AV fistula placement  -HD as per schedule     #c diff infection  -was previously admitted and discharged on vanco PO  -per d/c, last day 10/29    #left leg wound- local care   -wound consult    #DVT ppx  -cont coumadin    #Above discussed with Dr. Angeles     #Above discussed with pt and pt's daughter at bedside, all questions have been answered

## 2018-10-29 NOTE — PROGRESS NOTE ADULT - ASSESSMENT
83 year old male with extensive medical issues.    CAD s/p multiple PCIs in past- STEMI inferior- s/p LHC, patent major coronaries and stents-   Pt chest pain free.  So far cardiac enzymes were negative.  Continue current medications including ranexa.    Atrial fibrillation- paroxysmal- on full dose anticoagulation.    Hyperlipidemia- consider starting atorvastatin 40mg po HS in light of his extensive atherosclerotic vascular disease.    ESRD- on HD.    Other medical issues- Management per primary team.   Thank you for allowing me to participate in the care of this patient. Please feel free to contact me with any questions.

## 2018-10-29 NOTE — CONSULT NOTE ADULT - SUBJECTIVE AND OBJECTIVE BOX
NEPHROLOGY INTERVAL HPI/OVERNIGHT EVENTS:  ELDA [id number]\  84 y/o M with hx of arya/ckd HD depended presented with SSCP since the AM.  EKG revealed ST elevation in II/III/AVF.  LHC cath done last PM and was negative.  Pt CP free since this AM but again with chest pain with inspiration that spontaneouly resolved   Recently since mid last week with pain in b/l hand/wrist and now localized to left hand/wrist with associated swelling.  Limited ROM of left wrist with pain noted on internal rotation with radiation of his pain UP his left arm.   NO n/v/d  recently at  for ESBL in urine, tx  hx of cdiff  AVF in rt arm placed during this admission   HPI:  83 y.o. male PMH CAD s/p stents, AFib, diastolic CHF, hx of upper GI bleeding, PVD, hypertension, severe COPD (O2 dependent), SHAYY on bipap at night, ESRD on HD (MWF), recurrent c. diff presents with chest pain since this AM. Pt reports sharp, substernal, nonradiating, moderate intensity, on and off. Pt denies fever, chills, SOB, abd pain, dysuria, or diarrhea. Pt was found to have STEMI inferior lead. Pt was taken to cardiac cath for LHC and without any occlusion. Dr Wolff's note noted. Pt reports continued pain since the cath. (28 Oct 2018 19:15)      Patient is a 83y old  Male who presents with a chief complaint of chest pain (29 Oct 2018 08:11)      PAST MEDICAL & SURGICAL HISTORY:  - shayy on cpap   - CKD stage 3 ( scr 2- pre-amputation) , now closer to 1.6-1.7/with ARYA HD dependent since july 2018  - chf diastolic   - afib on ac  - DM2  - GI bleed with hx of Dieulafoy clipped in past   - dvt s/p ivc filter  - BPH s/p green light procedure  -gout  - HTN  - cad s/p PCI  (LAD, RCA bare metal around 9/16)  - COPD with O2  - spinal stenosis  - HLD  - GERD  - PAD /PVD s/p rt aka 6/28/17  - s/p rotator cuff tear  -  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,  - cdiff colitis  - ESBL UTI in sept 2018 [place] uti    FAMILY HISTORY:  Family history of colorectal cancer (Father)  Family history of diabetes mellitus (Mother, Sibling)  Family history of hypertension (Mother)      MEDICATIONS  (STANDING):  allopurinol 100 milliGRAM(s) Oral daily  aspirin  chewable 81 milliGRAM(s) Oral daily  buDESOnide   0.5 milliGRAM(s) Respule 0.5 milliGRAM(s) Inhalation two times a day  cholecalciferol 1000 Unit(s) Oral daily  cholestyramine Powder (Sugar-Free) 4 Gram(s) Oral daily  diltiazem    Tablet 30 milliGRAM(s) Oral every 6 hours  doxazosin 8 milliGRAM(s) Oral at bedtime  finasteride 5 milliGRAM(s) Oral daily  fluticasone propionate 50 MICROgram(s)/spray Nasal Spray 1 Spray(s) Both Nostrils <User Schedule>  loratadine 10 milliGRAM(s) Oral daily  pantoprazole    Tablet 40 milliGRAM(s) Oral before breakfast  ranolazine 500 milliGRAM(s) Oral two times a day  vancomycin    Solution 125 milliGRAM(s) Oral every 6 hours  warfarin 3 milliGRAM(s) Oral at bedtime    MEDICATIONS  (PRN):  acetaminophen   Tablet .. 650 milliGRAM(s) Oral every 6 hours PRN Temp greater or equal to 38C (100.4F), Mild Pain (1 - 3)      Allergies    Zosyn (Rash)    Intolerances        I&O's Summary      Home Medications:  acetaminophen 325 mg oral tablet: 2 tab(s) orally every 6 hours, As needed, Temp greater or equal to 38C (100.4F), Mild Pain (1 - 3) (28 Oct 2018 19:24)  allopurinol 100 mg oral tablet: 1 tab(s) orally once a day (28 Oct 2018 19:24)  aspirin 81 mg oral tablet, chewable: 1 tab(s) orally once a day (28 Oct 2018 19:24)  budesonide 0.5 mg/2 mL inhalation suspension: 2 milliliter(s) inhaled 2 times a day (28 Oct 2018 19:24)  Cardura 8 mg oral tablet: 1 tab(s) orally once a day (at bedtime) (28 Oct 2018 19:24)  cholestyramine 4 g/9 g oral powder for reconstitution: 1 packet(s) orally once a day (28 Oct 2018 19:24)  Claritin 10 mg oral tablet: 1 tab(s) orally once a day (28 Oct 2018 19:24)  dilTIAZem 30 mg oral tablet: 1 tab(s) orally every 6 hours (28 Oct 2018 19:24)  dutasteride 0.5 mg oral capsule: 1 cap(s) orally once a day (at bedtime) (28 Oct 2018 19:24)  Flonase 50 mcg/inh nasal spray: 1 spray(s) nasal once a day (28 Oct 2018 19:24)  pantoprazole 40 mg oral delayed release tablet: 1 tab(s) orally once a day (before a meal) (28 Oct 2018 19:24)  simvastatin 10 mg oral tablet: 1 tab(s) orally once a day (at bedtime) (28 Oct 2018 19:24)  Vitamin D3 1000 intl units oral tablet: 1 tab(s) orally once a day (28 Oct 2018 19:24)        Vital Signs Last 24 Hrs  T(C): 37 (29 Oct 2018 08:43), Max: 37.3 (28 Oct 2018 16:20)  T(F): 98.6 (29 Oct 2018 08:43), Max: 99.2 (28 Oct 2018 16:20)  HR: 74 (29 Oct 2018 09:00) (74 - 104)  BP: 122/48 (29 Oct 2018 09:00) (113/45 - 181/61)  BP(mean): 66 (29 Oct 2018 09:00) (61 - 97)  RR: 21 (29 Oct 2018 09:00) (11 - 28)  SpO2: 100% (29 Oct 2018 09:00) (99% - 100%)  Daily Height in cm: 170.18 (28 Oct 2018 16:20)    Daily   I&O's Summary      PHYSICAL EXAM:  GEN: alert awake O X 3  HEENT: MMM  NECK supple no jvd  CV: RRR s1s2 no rub  LUNGS: b/l CTA  ABD: + soft,   EXT: no edema    LABS:                        9.2    8.02  )-----------( 196      ( 29 Oct 2018 05:56 )             29.5     10-29    140  |  107  |  69<H>  ----------------------------<  85  4.2   |  20<L>  |  3.68<H>    Ca    8.3<L>      29 Oct 2018 05:56    TPro  6.6  /  Alb  2.5<L>  /  TBili  0.3  /  DBili  x   /  AST  11<L>  /  ALT  25  /  AlkPhos  69  10-28    PT/INR - ( 29 Oct 2018 05:56 )   PT: 28.0 sec;   INR: 2.54 ratio         PTT - ( 28 Oct 2018 16:24 )  PTT:39.4 sec

## 2018-10-30 LAB
ALBUMIN SERPL ELPH-MCNC: 2.2 G/DL — LOW (ref 3.3–5)
ANION GAP SERPL CALC-SCNC: 14 MMOL/L — SIGNIFICANT CHANGE UP (ref 5–17)
BASOPHILS # BLD AUTO: 0.05 K/UL — SIGNIFICANT CHANGE UP (ref 0–0.2)
BASOPHILS NFR BLD AUTO: 0.7 % — SIGNIFICANT CHANGE UP (ref 0–2)
BUN SERPL-MCNC: 82 MG/DL — HIGH (ref 7–23)
CALCIUM SERPL-MCNC: 8.7 MG/DL — SIGNIFICANT CHANGE UP (ref 8.5–10.1)
CHLORIDE SERPL-SCNC: 106 MMOL/L — SIGNIFICANT CHANGE UP (ref 96–108)
CO2 SERPL-SCNC: 20 MMOL/L — LOW (ref 22–31)
CREAT SERPL-MCNC: 4.45 MG/DL — HIGH (ref 0.5–1.3)
EOSINOPHIL # BLD AUTO: 0.33 K/UL — SIGNIFICANT CHANGE UP (ref 0–0.5)
EOSINOPHIL NFR BLD AUTO: 4.5 % — SIGNIFICANT CHANGE UP (ref 0–6)
GLUCOSE SERPL-MCNC: 95 MG/DL — SIGNIFICANT CHANGE UP (ref 70–99)
HCT VFR BLD CALC: 30.3 % — LOW (ref 39–50)
HGB BLD-MCNC: 9.4 G/DL — LOW (ref 13–17)
IMM GRANULOCYTES NFR BLD AUTO: 0.3 % — SIGNIFICANT CHANGE UP (ref 0–1.5)
INR BLD: 3.03 RATIO — HIGH (ref 0.88–1.16)
LYMPHOCYTES # BLD AUTO: 0.68 K/UL — LOW (ref 1–3.3)
LYMPHOCYTES # BLD AUTO: 9.3 % — LOW (ref 13–44)
MCHC RBC-ENTMCNC: 29.3 PG — SIGNIFICANT CHANGE UP (ref 27–34)
MCHC RBC-ENTMCNC: 31 GM/DL — LOW (ref 32–36)
MCV RBC AUTO: 94.4 FL — SIGNIFICANT CHANGE UP (ref 80–100)
MONOCYTES # BLD AUTO: 0.76 K/UL — SIGNIFICANT CHANGE UP (ref 0–0.9)
MONOCYTES NFR BLD AUTO: 10.4 % — SIGNIFICANT CHANGE UP (ref 2–14)
NEUTROPHILS # BLD AUTO: 5.46 K/UL — SIGNIFICANT CHANGE UP (ref 1.8–7.4)
NEUTROPHILS NFR BLD AUTO: 74.8 % — SIGNIFICANT CHANGE UP (ref 43–77)
NRBC # BLD: 0 /100 WBCS — SIGNIFICANT CHANGE UP (ref 0–0)
PHOSPHATE SERPL-MCNC: 7.5 MG/DL — HIGH (ref 2.5–4.5)
PLATELET # BLD AUTO: 245 K/UL — SIGNIFICANT CHANGE UP (ref 150–400)
POTASSIUM SERPL-MCNC: 4.7 MMOL/L — SIGNIFICANT CHANGE UP (ref 3.5–5.3)
POTASSIUM SERPL-SCNC: 4.7 MMOL/L — SIGNIFICANT CHANGE UP (ref 3.5–5.3)
PROTHROM AB SERPL-ACNC: 34.8 SEC — HIGH (ref 10–12.9)
RBC # BLD: 3.21 M/UL — LOW (ref 4.2–5.8)
RBC # FLD: 14.8 % — HIGH (ref 10.3–14.5)
SODIUM SERPL-SCNC: 140 MMOL/L — SIGNIFICANT CHANGE UP (ref 135–145)
WBC # BLD: 7.3 K/UL — SIGNIFICANT CHANGE UP (ref 3.8–10.5)
WBC # FLD AUTO: 7.3 K/UL — SIGNIFICANT CHANGE UP (ref 3.8–10.5)

## 2018-10-30 PROCEDURE — 93010 ELECTROCARDIOGRAM REPORT: CPT

## 2018-10-30 PROCEDURE — 73110 X-RAY EXAM OF WRIST: CPT | Mod: 26,LT

## 2018-10-30 RX ORDER — ERYTHROPOIETIN 10000 [IU]/ML
3000 INJECTION, SOLUTION INTRAVENOUS; SUBCUTANEOUS ONCE
Qty: 0 | Refills: 0 | Status: COMPLETED | OUTPATIENT
Start: 2018-10-30 | End: 2018-10-30

## 2018-10-30 RX ORDER — ERYTHROPOIETIN 10000 [IU]/ML
12000 INJECTION, SOLUTION INTRAVENOUS; SUBCUTANEOUS ONCE
Qty: 0 | Refills: 0 | Status: COMPLETED | OUTPATIENT
Start: 2018-10-30 | End: 2018-10-30

## 2018-10-30 RX ORDER — DILTIAZEM HCL 120 MG
120 CAPSULE, EXT RELEASE 24 HR ORAL DAILY
Qty: 0 | Refills: 0 | Status: DISCONTINUED | OUTPATIENT
Start: 2018-10-30 | End: 2018-11-01

## 2018-10-30 RX ADMIN — Medication 1000 UNIT(S): at 13:16

## 2018-10-30 RX ADMIN — Medication 5 MG/HR: at 05:36

## 2018-10-30 RX ADMIN — RANOLAZINE 500 MILLIGRAM(S): 500 TABLET, FILM COATED, EXTENDED RELEASE ORAL at 18:31

## 2018-10-30 RX ADMIN — Medication 120 MILLIGRAM(S): at 18:31

## 2018-10-30 RX ADMIN — FINASTERIDE 5 MILLIGRAM(S): 5 TABLET, FILM COATED ORAL at 13:16

## 2018-10-30 RX ADMIN — LORATADINE 10 MILLIGRAM(S): 10 TABLET ORAL at 13:16

## 2018-10-30 RX ADMIN — Medication 0.5 MILLIGRAM(S): at 08:11

## 2018-10-30 RX ADMIN — Medication 100 MILLIGRAM(S): at 13:16

## 2018-10-30 RX ADMIN — ERYTHROPOIETIN 12000 UNIT(S): 10000 INJECTION, SOLUTION INTRAVENOUS; SUBCUTANEOUS at 10:56

## 2018-10-30 RX ADMIN — Medication 650 MILLIGRAM(S): at 08:05

## 2018-10-30 RX ADMIN — ERYTHROPOIETIN 3000 UNIT(S): 10000 INJECTION, SOLUTION INTRAVENOUS; SUBCUTANEOUS at 10:57

## 2018-10-30 RX ADMIN — Medication 8 MILLIGRAM(S): at 21:19

## 2018-10-30 RX ADMIN — Medication 81 MILLIGRAM(S): at 13:16

## 2018-10-30 RX ADMIN — PANTOPRAZOLE SODIUM 40 MILLIGRAM(S): 20 TABLET, DELAYED RELEASE ORAL at 13:16

## 2018-10-30 RX ADMIN — RANOLAZINE 500 MILLIGRAM(S): 500 TABLET, FILM COATED, EXTENDED RELEASE ORAL at 05:36

## 2018-10-30 RX ADMIN — Medication 0.5 MILLIGRAM(S): at 21:30

## 2018-10-30 RX ADMIN — Medication 1 SPRAY(S): at 13:16

## 2018-10-30 NOTE — PROGRESS NOTE ADULT - ASSESSMENT
83 year old male with extensive medical issues.    Atrial fibrillation with RVR- rate controlled on cardizem drip now.  WIll start po cardizem and will try to downtitrate the drip.  BP optimal.   close monitoring of the renal function and electrolytes.  Goal potassium of 4 and magnesium of 2.   Full dose anticoagulation to continue.     CAD s/p multiple PCIs in past- STEMI inferior- s/p LHC, patent major coronaries and stents-   Pt chest pain free.  So far cardiac enzymes were negative.  Continue current medications including ranexa.  Pts daughter concerned that her mother  3 days after starting Ranexa.  Will check QT interval with EKG.  Reassurance provided to daughter stating that pt is on the lower dose and QT interval will be monitored.       Hyperlipidemia- consider starting atorvastatin 40mg po HS in light of his extensive atherosclerotic vascular disease.    ESRD- on HD.    Other medical issues- Management per primary team.   Thank you for allowing me to participate in the care of this patient. Please feel free to contact me with any questions.

## 2018-10-30 NOTE — ADVANCED PRACTICE NURSE CONSULT - ASSESSMENT
This is an 83 year old male that was admitted to the hospital on 10/28/2018 for an MI.  PMH- CAD s/p stents, AFib, diastolic CHF, hx of upper GI bleeding, PVD, hypertension, severe COPD (O2 dependent), SHAYY on bipap at night, ESRD on HD (MWF), recurrent c. diff.    Requested by MD to assess and treat patient's LLE venous stasis ulcer. Patient presents on a [employer] Sport Mattress on his left side with right heel elevated off mattress. Old dressing removed to LLE. Wound irrigated with normal saline. Measures 8cmx4.5cmx0.2cm.  Wound with 100% beefy red tissue.  No odor noted. Periwound intact. Positive pedal pulse auscultated with doppler. Extremity warm to touch. Xeroform gauze applied to wound bed to promote autolytic debridement and wound healing. Kerlix and Ace bandage wrapped to LLE.  Extremity elevated off mattress with pillow.  Patient positioned to his backside per RN.

## 2018-10-30 NOTE — ADVANCED PRACTICE NURSE CONSULT - RECOMMEDATIONS
1) Turn and position every 2 hours  2) Elevate LLE off mattress with pillow.   3) Change dressing with xeroform, kerlix and ace bandage daily

## 2018-10-30 NOTE — PROGRESS NOTE ADULT - ASSESSMENT
A/P    #chest pain with EKG changes  -s/p cath - did not require any stent during this time   -now chest pain free   -ct same care and monitor pt     #AFib on coumadin- ct coumadin as per INR   -developed RVR and now in sinus with control heart rate   -change Cardizem drip to po   -s/p Cardizem drip      #CAD s/p stents      #chronic diastolic CHF  -I/O, daily weight    #COPD on O2  -supplemental oxygen and pulse ox monitoring    #ESRD on HD MWF  -pt with recent right arm AV fistula placement  -HD as per schedule       #left leg wound- local care   - local care     #DVT ppx  -cont coumadin    #Above discussed with Dr. Angeles     #Above discussed with pt and pt's daughter at bedside, all questions have been answered     #discharge plan

## 2018-10-30 NOTE — PROGRESS NOTE ADULT - ASSESSMENT
82 y/o M with with ARYA/CKD remains HD dependent with SSCP with ST elevation in infolateral pattern.  LHC, CK, troponin negative with now recurrance of CP worse on inspiration.    New onset of left> right wrist pain with limited ROM.    PLAN  - ESRD: hd today and will plan for 3x per hd this week and monitor response  - AOCD with hx of GI bleed epo with HD   - Chest pain with ST elevation: with recurrence of pain on ranexa ? pericarditis  DW Dr Contreras will plan for ECHO and will send out ESR etc to be drawn at HD today.      10/30  s/p HD   cardiology input noted  esr crp elevated  cardiac enzymes neg

## 2018-10-31 LAB
ALBUMIN SERPL ELPH-MCNC: 2.3 G/DL — LOW (ref 3.3–5)
ANION GAP SERPL CALC-SCNC: 10 MMOL/L — SIGNIFICANT CHANGE UP (ref 5–17)
BUN SERPL-MCNC: 46 MG/DL — HIGH (ref 7–23)
CALCIUM SERPL-MCNC: 8.2 MG/DL — LOW (ref 8.5–10.1)
CHLORIDE SERPL-SCNC: 106 MMOL/L — SIGNIFICANT CHANGE UP (ref 96–108)
CO2 SERPL-SCNC: 25 MMOL/L — SIGNIFICANT CHANGE UP (ref 22–31)
CREAT SERPL-MCNC: 3.44 MG/DL — HIGH (ref 0.5–1.3)
GLUCOSE SERPL-MCNC: 133 MG/DL — HIGH (ref 70–99)
HCT VFR BLD CALC: 31.2 % — LOW (ref 39–50)
HGB BLD-MCNC: 9.6 G/DL — LOW (ref 13–17)
INR BLD: 2.88 RATIO — HIGH (ref 0.88–1.16)
MCHC RBC-ENTMCNC: 29.4 PG — SIGNIFICANT CHANGE UP (ref 27–34)
MCHC RBC-ENTMCNC: 30.8 GM/DL — LOW (ref 32–36)
MCV RBC AUTO: 95.4 FL — SIGNIFICANT CHANGE UP (ref 80–100)
NRBC # BLD: 0 /100 WBCS — SIGNIFICANT CHANGE UP (ref 0–0)
PHOSPHATE SERPL-MCNC: 5.3 MG/DL — HIGH (ref 2.5–4.5)
PLATELET # BLD AUTO: 224 K/UL — SIGNIFICANT CHANGE UP (ref 150–400)
POTASSIUM SERPL-MCNC: 4.1 MMOL/L — SIGNIFICANT CHANGE UP (ref 3.5–5.3)
POTASSIUM SERPL-SCNC: 4.1 MMOL/L — SIGNIFICANT CHANGE UP (ref 3.5–5.3)
PROTHROM AB SERPL-ACNC: 33 SEC — HIGH (ref 10–12.9)
RBC # BLD: 3.27 M/UL — LOW (ref 4.2–5.8)
RBC # FLD: 14.6 % — HIGH (ref 10.3–14.5)
SODIUM SERPL-SCNC: 141 MMOL/L — SIGNIFICANT CHANGE UP (ref 135–145)
WBC # BLD: 5.25 K/UL — SIGNIFICANT CHANGE UP (ref 3.8–10.5)
WBC # FLD AUTO: 5.25 K/UL — SIGNIFICANT CHANGE UP (ref 3.8–10.5)

## 2018-10-31 PROCEDURE — 93306 TTE W/DOPPLER COMPLETE: CPT | Mod: 26

## 2018-10-31 RX ORDER — ERYTHROPOIETIN 10000 [IU]/ML
3000 INJECTION, SOLUTION INTRAVENOUS; SUBCUTANEOUS ONCE
Qty: 0 | Refills: 0 | Status: COMPLETED | OUTPATIENT
Start: 2018-10-31 | End: 2018-10-31

## 2018-10-31 RX ORDER — ATORVASTATIN CALCIUM 80 MG/1
20 TABLET, FILM COATED ORAL AT BEDTIME
Qty: 0 | Refills: 0 | Status: DISCONTINUED | OUTPATIENT
Start: 2018-10-31 | End: 2018-11-01

## 2018-10-31 RX ORDER — ERYTHROPOIETIN 10000 [IU]/ML
12000 INJECTION, SOLUTION INTRAVENOUS; SUBCUTANEOUS ONCE
Qty: 0 | Refills: 0 | Status: COMPLETED | OUTPATIENT
Start: 2018-10-31 | End: 2018-10-31

## 2018-10-31 RX ORDER — SEVELAMER CARBONATE 2400 MG/1
800 POWDER, FOR SUSPENSION ORAL
Qty: 0 | Refills: 0 | Status: DISCONTINUED | OUTPATIENT
Start: 2018-10-31 | End: 2018-11-01

## 2018-10-31 RX ADMIN — WARFARIN SODIUM 3 MILLIGRAM(S): 2.5 TABLET ORAL at 21:46

## 2018-10-31 RX ADMIN — ERYTHROPOIETIN 3000 UNIT(S): 10000 INJECTION, SOLUTION INTRAVENOUS; SUBCUTANEOUS at 13:53

## 2018-10-31 RX ADMIN — Medication 8 MILLIGRAM(S): at 21:46

## 2018-10-31 RX ADMIN — SEVELAMER CARBONATE 800 MILLIGRAM(S): 2400 POWDER, FOR SUSPENSION ORAL at 18:47

## 2018-10-31 RX ADMIN — ATORVASTATIN CALCIUM 20 MILLIGRAM(S): 80 TABLET, FILM COATED ORAL at 21:46

## 2018-10-31 RX ADMIN — Medication 0.5 MILLIGRAM(S): at 08:31

## 2018-10-31 RX ADMIN — Medication 1 APPLICATION(S): at 15:11

## 2018-10-31 RX ADMIN — Medication 100 MILLIGRAM(S): at 15:11

## 2018-10-31 RX ADMIN — Medication 1000 UNIT(S): at 15:11

## 2018-10-31 RX ADMIN — Medication 1 SPRAY(S): at 11:48

## 2018-10-31 RX ADMIN — PANTOPRAZOLE SODIUM 40 MILLIGRAM(S): 20 TABLET, DELAYED RELEASE ORAL at 06:02

## 2018-10-31 RX ADMIN — SEVELAMER CARBONATE 800 MILLIGRAM(S): 2400 POWDER, FOR SUSPENSION ORAL at 15:11

## 2018-10-31 RX ADMIN — RANOLAZINE 500 MILLIGRAM(S): 500 TABLET, FILM COATED, EXTENDED RELEASE ORAL at 18:47

## 2018-10-31 RX ADMIN — CHOLESTYRAMINE 4 GRAM(S): 4 POWDER, FOR SUSPENSION ORAL at 08:55

## 2018-10-31 RX ADMIN — RANOLAZINE 500 MILLIGRAM(S): 500 TABLET, FILM COATED, EXTENDED RELEASE ORAL at 06:01

## 2018-10-31 RX ADMIN — FINASTERIDE 5 MILLIGRAM(S): 5 TABLET, FILM COATED ORAL at 15:11

## 2018-10-31 RX ADMIN — ERYTHROPOIETIN 12000 UNIT(S): 10000 INJECTION, SOLUTION INTRAVENOUS; SUBCUTANEOUS at 13:53

## 2018-10-31 RX ADMIN — Medication 120 MILLIGRAM(S): at 06:02

## 2018-10-31 RX ADMIN — Medication 0.5 MILLIGRAM(S): at 20:33

## 2018-10-31 RX ADMIN — LORATADINE 10 MILLIGRAM(S): 10 TABLET ORAL at 15:11

## 2018-10-31 RX ADMIN — Medication 81 MILLIGRAM(S): at 15:11

## 2018-10-31 NOTE — DIETITIAN INITIAL EVALUATION ADULT. - NUTRITIONGOAL OUTCOME1
EXAMINATION: Two-view chest (PA and Lateral views).

 

HISTORY: Shortness of breath.

 

FINDINGS: 

The trachea is midline. The cardiomediastinal silhouette is within normal limits. Mild left basilar 
atelectasis and/or infiltrate, trace pleural effusion is not excluded. Right-sided portacatheter is 
noted. Left-sided ICD also present.

 

Osseous structures appear unremarkable.

 

IMPRESSION: 

Mild left basilar atelectasis and/or infiltrate. PO intake >80% of ENN, healing wound/pressure ulcers, no signs of edema

## 2018-10-31 NOTE — DIETITIAN INITIAL EVALUATION ADULT. - ENERGY NEEDS
Ht.   67   "        Wt. 132.2   #              BMI     20.7             IBW  145  #               Pt is at  91  %  IBW Ht.   67   "        Wt. 151  #              BMI     23.6             IBW  145  #               Pt is at  104  %  IBW

## 2018-10-31 NOTE — PROGRESS NOTE ADULT - ASSESSMENT
A/P    #chest pain with EKG changes  -s/p cath - did not require any stent during this time   -now chest pain free   -stable on ranexa with EKG not showing any worsening of QT   -transfer out of icu and discharge plan     #AFib on coumadin- ct coumadin as per INR   -developed RVR during this hospitalization, stable heart rate now   -ct to monitor it     #CAD s/p stents      #chronic diastolic CHF  -I/O, daily weight    #COPD on O2  stable     #ESRD on HD MWF  -pt with recent right arm AV fistula placement  -HD as per schedule       #left leg wound- local care   - local care     #DVT ppx  -cont coumadin    #Above discussed with Dr. Angeles     #Above discussed with pt , all questions have been answered     #discharge plan for tomorrow if remains stable

## 2018-10-31 NOTE — DIETITIAN INITIAL EVALUATION ADULT. - OTHER INFO
Nutrition assessment 2/2 to pressure ulcer stage 2 or > with no nutrition consult. Pt is a 82yo male admitted from [facility] with MI. History of AFIB, Anemia, CKD, Severe COPD, Bipap at night, ESRD/HD. Pt with good appetite and ~100% of meals consumed. Pt aware of renal restrictions however meals provided by living facility. Phosphorus levels high and Reglan initiated this morning 10/31/18. Skin: stage 1 sacrum, Left buttocks suspected DTI, and wounds: left lateral shin, left heel wound +1 edema documented. Jalen score=17 (high risk of skin breakdown). Denies any difficulty chewing or swallowing. Pt appears well nourished however unable to perform NFPE due to pt receiving HD. Significant intentional weight loss noted (due to decreased intentional intake and fluid shifting). Weights currently stable x 1 month. Possible d/c to home tomorrow 11/1-if medically stable. Recommendations: 1) Continue with Renal diet and fluid restriction. 2) Monitor weights daily. 3) Nepro shake once daily (8oz) for additional protein 2/2 HD.

## 2018-10-31 NOTE — PROGRESS NOTE ADULT - ASSESSMENT
83 year old male with extensive medical issues.    Atrial fibrillation with RVR- spontaneous conversion to sinus rythm.  will continue cardizem PO. Off cardizem drip.  close monitoring of the renal function and electrolytes.  Goal potassium of 4 and magnesium of 2.   Full dose anticoagulation to continue.     CAD s/p multiple PCIs in past- STEMI inferior- s/p LHC, patent major coronaries and stents-   Pt chest pain free.    So far cardiac enzymes were negative.  Continue current medications including ranexa.  EKG from yesterday reviewed and it showed normal QT interval with persistent ST elevation.    Hyperlipidemia- consider starting atorvastatin 40mg po HS in light of his extensive atherosclerotic vascular disease.    ESRD- on HD.    Other medical issues- Management per primary team.   Thank you for allowing me to participate in the care of this patient. Please feel free to contact me with any questions. 83 year old male with extensive medical issues.    Atrial fibrillation with RVR- spontaneous conversion to sinus rythm.  will continue cardizem PO. Off cardizem drip.  close monitoring of the renal function and electrolytes.  Goal potassium of 4 and magnesium of 2.   Full dose anticoagulation to continue.     CAD s/p multiple PCIs in past- STEMI inferior- s/p LHC, patent major coronaries and stents-   Pt chest pain free.    So far cardiac enzymes were negative.  Continue current medications including ranexa.  EKG from yesterday reviewed and it showed normal QT interval with persistent ST elevation.    Hyperlipidemia- Will start him on atorvastatin and if he toleartes can increase to 40mg wtih periodic monitoring of the LFTs.     ESRD- on HD.    L arm pain- Management pre primary team.     Other medical issues- Management per primary team.   Thank you for allowing me to participate in the care of this patient. Please feel free to contact me with any questions.

## 2018-10-31 NOTE — PROGRESS NOTE ADULT - ASSESSMENT
84 y/o M with with ARYA/CKD remains HD dependent with SSCP with ST elevation in infolateral pattern.  LHC, CK, troponin negative with now recurrance of CP worse on inspiration.    New onset of left> right wrist pain with limited ROM.    PLAN  - ESRD: hd today and will plan for 3x per hd this week and monitor response  - AOCD with hx of GI bleed epo with HD   - Chest pain with ST elevation: with recurrence of pain on ranexa ? pericarditis  DW Dr Contreras will plan for ECHO and will send out ESR etc to be drawn at HD today.      10/30  s/p HD   cardiology input noted  esr crp elevated  cardiac enzymes neg    10/31 SY  --ESRD ; noted with increased BUN and creat compared to past few weeks.  Will change to TIW HD schedule.    HD for 2 1/2 hours today for 1 kg UF goal.  --A fib : HR controlled.  Noted for persistent st elevation.    Check Echo.  On exam, no rub to indicate pericarditis.

## 2018-10-31 NOTE — DIETITIAN INITIAL EVALUATION ADULT. - NS FNS WEIGHT CHANGE REASON
50 yo M with history of lymphoma in remission, with episode of cellulitis in 4/2017 treated with Keflex/clindamycin, now returns with fevers, chills and RLE redness. No trauma to RLE. No purulence or ulcers. Having fevers, was tachycardic in ED. Appears to be sepsis 2/2 to non-purulent cellulitis. On exam appears consistent with a strep cellulitis. Most likely recurrent due to prior insult to skin (prior cellulitis). Not consistent with deeper infection.  Overall appears hemodynamically stable.  - Cefazolin 1 gram IV q 8  - F/U blood cultures, if positive would need adjustment in abx  - Check CK for tomorrow AM  - Monitor RLE, if worsening or appears as rapidly progressive may need surgical eval or imaging (presently no signs of nec fasc--no bullae, no necrosis, no excruciating pain, appears stable)      Orion Vale MD  Pager [phone]  After 5pm and on weekends call [phone] intentional

## 2018-10-31 NOTE — DIETITIAN INITIAL EVALUATION ADULT. - PERTINENT MEDS FT
MEDICATIONS  (STANDING):  allopurinol 100 milliGRAM(s) Oral daily  aspirin  chewable 81 milliGRAM(s) Oral daily  atorvastatin 20 milliGRAM(s) Oral at bedtime  buDESOnide   0.5 milliGRAM(s) Respule 0.5 milliGRAM(s) Inhalation two times a day  cholecalciferol 1000 Unit(s) Oral daily  cholestyramine Powder (Sugar-Free) 4 Gram(s) Oral daily  diltiazem    milliGRAM(s) Oral daily  doxazosin 8 milliGRAM(s) Oral at bedtime  epoetin nelly Injectable 74110 Unit(s) IV Push <User Schedule>  epoetin nelly Injectable 3000 Unit(s) IV Push <User Schedule>  epoetin nelly Injectable 61230 Unit(s) IV Push once  epoetin nelly Injectable 3000 Unit(s) IV Push once  finasteride 5 milliGRAM(s) Oral daily  fluticasone propionate 50 MICROgram(s)/spray Nasal Spray 1 Spray(s) Both Nostrils <User Schedule>  loratadine 10 milliGRAM(s) Oral daily  pantoprazole    Tablet 40 milliGRAM(s) Oral before breakfast  ranolazine 500 milliGRAM(s) Oral two times a day  sevelamer hydrochloride 800 milliGRAM(s) Oral three times a day with meals  silver sulfADIAZINE 1% Cream 1 Application(s) Topical <User Schedule>  warfarin 3 milliGRAM(s) Oral at bedtime    MEDICATIONS  (PRN):  acetaminophen   Tablet .. 650 milliGRAM(s) Oral every 6 hours PRN Temp greater or equal to 38C (100.4F), Mild Pain (1 - 3)

## 2018-11-01 ENCOUNTER — TRANSCRIPTION ENCOUNTER (OUTPATIENT)
Age: 83
End: 2018-11-01

## 2018-11-01 VITALS — WEIGHT: 132.28 LBS

## 2018-11-01 LAB
INR BLD: 3.11 RATIO — HIGH (ref 0.88–1.16)
PROTHROM AB SERPL-ACNC: 35.7 SEC — HIGH (ref 10–12.9)

## 2018-11-01 RX ORDER — ATORVASTATIN CALCIUM 80 MG/1
1 TABLET, FILM COATED ORAL
Qty: 30 | Refills: 0 | OUTPATIENT
Start: 2018-11-01

## 2018-11-01 RX ORDER — DOXAZOSIN MESYLATE 4 MG
1 TABLET ORAL
Qty: 30 | Refills: 0 | OUTPATIENT
Start: 2018-11-01

## 2018-11-01 RX ORDER — LORATADINE 10 MG/1
1 TABLET ORAL
Qty: 0 | Refills: 0 | COMMUNITY
Start: 2018-11-01

## 2018-11-01 RX ORDER — WARFARIN SODIUM 2.5 MG/1
1 TABLET ORAL
Qty: 14 | Refills: 0 | OUTPATIENT
Start: 2018-11-01

## 2018-11-01 RX ORDER — RANOLAZINE 500 MG/1
1 TABLET, FILM COATED, EXTENDED RELEASE ORAL
Qty: 60 | Refills: 0 | OUTPATIENT
Start: 2018-11-01

## 2018-11-01 RX ORDER — ALLOPURINOL 300 MG
1 TABLET ORAL
Qty: 30 | Refills: 0 | OUTPATIENT
Start: 2018-11-01

## 2018-11-01 RX ORDER — FLUTICASONE PROPIONATE 50 MCG
2 SPRAY, SUSPENSION NASAL
Qty: 0 | Refills: 0 | DISCHARGE
Start: 2018-11-01

## 2018-11-01 RX ORDER — BUDESONIDE, MICRONIZED 100 %
2 POWDER (GRAM) MISCELLANEOUS
Qty: 2 | Refills: 0
Start: 2018-11-01

## 2018-11-01 RX ORDER — SEVELAMER CARBONATE 2400 MG/1
1 POWDER, FOR SUSPENSION ORAL
Qty: 42 | Refills: 0 | OUTPATIENT
Start: 2018-11-01 | End: 2018-11-14

## 2018-11-01 RX ORDER — ASPIRIN/CALCIUM CARB/MAGNESIUM 324 MG
1 TABLET ORAL
Qty: 30 | Refills: 0
Start: 2018-11-01 | End: 2018-11-30

## 2018-11-01 RX ORDER — CHOLECALCIFEROL (VITAMIN D3) 125 MCG
1 CAPSULE ORAL
Qty: 0 | Refills: 0 | COMMUNITY

## 2018-11-01 RX ORDER — FLUTICASONE PROPIONATE 50 MCG
1 SPRAY, SUSPENSION NASAL
Qty: 2 | Refills: 0 | OUTPATIENT
Start: 2018-11-01

## 2018-11-01 RX ORDER — ALLOPURINOL 300 MG
1 TABLET ORAL
Qty: 0 | Refills: 0 | DISCHARGE
Start: 2018-11-01

## 2018-11-01 RX ORDER — DUTASTERIDE 0.5 MG/1
1 CAPSULE, LIQUID FILLED ORAL
Qty: 0 | Refills: 0 | COMMUNITY

## 2018-11-01 RX ORDER — CHOLECALCIFEROL (VITAMIN D3) 125 MCG
1 CAPSULE ORAL
Qty: 30 | Refills: 0
Start: 2018-11-01 | End: 2018-11-30

## 2018-11-01 RX ORDER — PANTOPRAZOLE SODIUM 20 MG/1
1 TABLET, DELAYED RELEASE ORAL
Qty: 30 | Refills: 0
Start: 2018-11-01 | End: 2018-11-30

## 2018-11-01 RX ORDER — FLUTICASONE PROPIONATE 50 MCG
1 SPRAY, SUSPENSION NASAL
Qty: 0 | Refills: 0 | COMMUNITY

## 2018-11-01 RX ORDER — DILTIAZEM HCL 120 MG
1 CAPSULE, EXT RELEASE 24 HR ORAL
Qty: 30 | Refills: 0 | OUTPATIENT
Start: 2018-11-01

## 2018-11-01 RX ORDER — CHOLESTYRAMINE 4 G/9G
1 POWDER, FOR SUSPENSION ORAL
Qty: 30 | Refills: 0 | OUTPATIENT
Start: 2018-11-01 | End: 2018-11-30

## 2018-11-01 RX ADMIN — FINASTERIDE 5 MILLIGRAM(S): 5 TABLET, FILM COATED ORAL at 13:37

## 2018-11-01 RX ADMIN — RANOLAZINE 500 MILLIGRAM(S): 500 TABLET, FILM COATED, EXTENDED RELEASE ORAL at 16:16

## 2018-11-01 RX ADMIN — Medication 1000 UNIT(S): at 13:38

## 2018-11-01 RX ADMIN — CHOLESTYRAMINE 4 GRAM(S): 4 POWDER, FOR SUSPENSION ORAL at 13:45

## 2018-11-01 RX ADMIN — SEVELAMER CARBONATE 800 MILLIGRAM(S): 2400 POWDER, FOR SUSPENSION ORAL at 13:37

## 2018-11-01 RX ADMIN — Medication 120 MILLIGRAM(S): at 06:07

## 2018-11-01 RX ADMIN — LORATADINE 10 MILLIGRAM(S): 10 TABLET ORAL at 13:45

## 2018-11-01 RX ADMIN — Medication 100 MILLIGRAM(S): at 13:37

## 2018-11-01 RX ADMIN — PANTOPRAZOLE SODIUM 40 MILLIGRAM(S): 20 TABLET, DELAYED RELEASE ORAL at 06:07

## 2018-11-01 RX ADMIN — Medication 81 MILLIGRAM(S): at 13:38

## 2018-11-01 RX ADMIN — RANOLAZINE 500 MILLIGRAM(S): 500 TABLET, FILM COATED, EXTENDED RELEASE ORAL at 06:07

## 2018-11-01 RX ADMIN — Medication 0.5 MILLIGRAM(S): at 08:29

## 2018-11-01 RX ADMIN — Medication 1 SPRAY(S): at 13:45

## 2018-11-01 NOTE — DISCHARGE NOTE ADULT - MEDICATION SUMMARY - MEDICATIONS TO STOP TAKING
I will STOP taking the medications listed below when I get home from the hospital:    simvastatin 10 mg oral tablet  -- 1 tab(s) by mouth once a day (at bedtime)    vancomycin 125 mg oral capsule  -- 1 cap(s) by mouth every 6 hours

## 2018-11-01 NOTE — PROGRESS NOTE ADULT - REASON FOR ADMISSION
chest pain

## 2018-11-01 NOTE — DISCHARGE NOTE ADULT - PLAN OF CARE
Stable patient Ranolazine added.  Follow-up with cardiology within 1 week.  Take atorvastatin in stead of simvastatin.  Let primary doctor or emergency services know if pain re-occurs.  Pain resolved now, has left arm pain which is chronic and unrelated  Cardiac cath showed clear stents. Stable. Continue coumadin.  Follow-up with cardiology and primary care physician.  Check INR levels routinely. Continue with dialysis per schedule.   Takes procrit with dialysis for chronic anemia.  Follow-up with nephrology. Continue inhaler medications

## 2018-11-01 NOTE — PHYSICAL THERAPY INITIAL EVALUATION ADULT - DISCHARGE DISPOSITION, PT EVAL
home w/ home PT/return to [facility] for HD with physical therapy and return to slide board transfers.

## 2018-11-01 NOTE — DISCHARGE NOTE ADULT - CARE PROVIDER_API CALL
Shefali Sosa), Cardiovascular Disease; Critical Care Medicine; Internal Medicine; Interventional Cardiology  [address] [place] [address]  Phone: [phone]  Fax: [phone]

## 2018-11-01 NOTE — PROGRESS NOTE ADULT - ASSESSMENT
83 year old male with extensive medical issues.    Atrial fibrillation with RVR- maintaining sinus rythm.  will continue cardizem PO.  close monitoring of the renal function and electrolytes.  Goal potassium of 4 and magnesium of 2.   Full dose anticoagulation to continue.     CAD s/p multiple PCIs in past- STEMI inferior- s/p LHC, patent major coronaries and stents-   Pt chest pain free.    Continue current meds.     Hyperlipidemia- Will start him on atorvastatin and if he tolerates can increase to 40mg wtih periodic monitoring of the LFTs.     ESRD- on HD.    L arm pain- Management pre primary team.     Other medical issues- Management per primary team.   Thank you for allowing me to participate in the care of this patient. Please feel free to contact me with any questions.

## 2018-11-01 NOTE — DISCHARGE NOTE ADULT - MEDICATION SUMMARY - MEDICATIONS TO TAKE
I will START or STAY ON the medications listed below when I get home from the hospital:    Avodart 0.5 mg oral capsule  -- 1 cap(s) by mouth once a day (at bedtime)   -- Do not take this drug if you are pregnant.    -- Indication: For BPH    budesonide 0.5 mg/2 mL inhalation suspension  -- 2 milliliter(s) inhaled 2 times a day  -- Indication: For COPD    aspirin 81 mg oral tablet, chewable  -- 1 tab(s) by mouth once a day  -- Indication: For Heart health    acetaminophen 325 mg oral tablet  -- 2 tab(s) by mouth every 6 hours, As needed, Temp greater or equal to 38C (100.4F), Mild Pain (1 - 3)  -- Indication: For Pain    Cardura 8 mg oral tablet  -- 1 tab(s) by mouth once a day (at bedtime)  -- Indication: For BPH    ranolazine 500 mg oral tablet, extended release  -- 1 tab(s) by mouth 2 times a day  -- Indication: For Chest pain    dilTIAZem 120 mg/24 hours oral capsule, extended release  -- 1 cap(s) by mouth once a day  -- Indication: For A fib    warfarin 3 mg oral tablet  -- 1 tab(s) by mouth once a day (at bedtime)  -- Indication: For A fib    allopurinol 100 mg oral tablet  -- 1 tab(s) by mouth once a day  -- Indication: For Gout    loratadine 10 mg oral tablet  -- 1 tab(s) by mouth once a day  -- Indication: For Allergies    Lipitor 20 mg oral tablet  -- 1 tab(s) by mouth once a day (at bedtime)  -- Indication: For Heart health    cholestyramine 4 g/9 g oral powder for reconstitution  -- 1 packet(s) by mouth once a day  -- Indication: For Malnutrition    silver sulfADIAZINE 1% topical cream  -- Apply on skin to affected area once a day   -- Indication: For As needed for rash/ skin protection    Flonase 50 mcg/inh nasal spray  -- 1 spray(s) into nose once a day  -- Indication: For Allergies    sevelamer hydrochloride 800 mg oral tablet  -- 1 tab(s) by mouth 3 times a day (with meals)  -- Indication: For CKD    pantoprazole 40 mg oral delayed release tablet  -- 1 tab(s) by mouth once a day (before a meal)  -- Indication: For GERD    Vitamin D3 1000 intl units oral tablet  -- 1 tab(s) by mouth once a day  -- Indication: For Supplement I will START or STAY ON the medications listed below when I get home from the hospital:    Avodart 0.5 mg oral capsule  -- 1 cap(s) by mouth once a day (at bedtime)   -- Do not take this drug if you are pregnant.    -- Indication: For BPH    budesonide 0.5 mg/2 mL inhalation suspension  -- 2 milliliter(s) inhaled 2 times a day  -- Indication: For COPD    acetaminophen 325 mg oral tablet  -- 2 tab(s) by mouth every 6 hours, As needed, Temp greater or equal to 38C (100.4F), Mild Pain (1 - 3)  -- Indication: For Pain    aspirin 81 mg oral tablet, chewable  -- 1 tab(s) by mouth once a day  -- Indication: For Heart health    Cardura 8 mg oral tablet  -- 1 tab(s) by mouth once a day (at bedtime)  -- Indication: For BPH    ranolazine 500 mg oral tablet, extended release  -- 1 tab(s) by mouth 2 times a day  -- Indication: For Chest pain    dilTIAZem 120 mg/24 hours oral capsule, extended release  -- 1 cap(s) by mouth once a day  -- Indication: For A fib    warfarin 3 mg oral tablet  -- 1 tab(s) by mouth once a day (at bedtime)  -- Indication: For A fib    allopurinol 100 mg oral tablet  -- 1 tab(s) by mouth once a day  -- Indication: For Gout    loratadine 10 mg oral tablet  -- 1 tab(s) by mouth once a day  -- Indication: For Allergies    Lipitor 20 mg oral tablet  -- 1 tab(s) by mouth once a day (at bedtime)  -- Indication: For Heart health    cholestyramine 4 g/9 g oral powder for reconstitution  -- 1 packet(s) by mouth once a day  -- Indication: For Malnutrition    silver sulfADIAZINE 1% topical cream  -- Apply on skin to affected area once a day   -- Indication: For As needed for rash/ skin protection    Flonase 50 mcg/inh nasal spray  -- 1 spray(s) into nose once a day  -- Indication: For Allergies    sevelamer hydrochloride 800 mg oral tablet  -- 1 tab(s) by mouth 3 times a day (with meals)  -- Indication: For CKD    pantoprazole 40 mg oral delayed release tablet  -- 1 tab(s) by mouth once a day (before a meal)  -- Indication: For GERD    Vitamin D3 1000 intl units oral tablet  -- 1 tab(s) by mouth once a day  -- Indication: For Supplement

## 2018-11-01 NOTE — PROGRESS NOTE ADULT - PROVIDER SPECIALTY LIST ADULT
Cardiology
Hospitalist
Nephrology
Cardiology

## 2018-11-01 NOTE — DISCHARGE NOTE ADULT - SECONDARY DIAGNOSIS.
Chronic atrial fibrillation Chronic kidney disease, unspecified CKD stage COPD (chronic obstructive pulmonary disease)

## 2018-11-01 NOTE — PHYSICAL THERAPY INITIAL EVALUATION ADULT - ADDITIONAL COMMENTS
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 Pt utilizes sliding board for transfers OOB to w/c. Has never used prosthetic.

## 2018-11-01 NOTE — DISCHARGE NOTE ADULT - PATIENT PORTAL LINK FT
You can access the [email or web address] Patient Portal, offered by [facility], by registering with the following website: [email or web address]

## 2018-11-01 NOTE — DISCHARGE NOTE ADULT - HOSPITAL COURSE
83 y.o. male PMH CAD s/p stents, AFib, diastolic CHF, hx of upper GI bleeding, PVD, hypertension, severe COPD (O2 dependent), SHAYY on bipap at night, ESRD on HD (MWF), recurrent c. diff presents with chest pain. Pt reports sharp, substernal, non radiating moderate intensity, on and off. Pt denies fever, chills, SOB, abd pain, dysuria, or diarrhea. Pt was found to have STEMI inferior lead. Pt was taken to cardiac cath for LHC and without any occlusion.     Patient comfortable without acute complaints.    T(C): 36.9 (11-01-18 @ 08:12)  T(F): 98.4 (11-01-18 @ 08:12), Max: 98.5 (10-31-18 @ 21:00)  HR: 84 (11-01-18 @ 10:00) (71 - 84)  BP: 130/41 (11-01-18 @ 10:00) (115/32 - 154/48)  RR:  (10 - 26)  SpO2:  (100% - 100%)  Wt(kg): --    PHYSICAL EXAM:    GENERAL: NAD, frail  HEAD:  NC/AT  EYES: EOMI, PERRLA, no scleral icterus  HEENT: Moist mucous membranes  NECK: Supple, No JVD  CNS:  Alert & Oriented X3  LUNG: Clear to auscultation bilaterally; No rales, rhonchi, wheezing, or rubs  HEART: RRR; No murmurs, rubs, or gallops  ABDOMEN: +BS, ST/ND/NT  EXTREMITIES:  2+ Peripheral Pulses, No clubbing, cyanosis, or edema  MUSCULOSKELTAL- Joints normal ROM, no Muscle or joint tenderness

## 2018-11-01 NOTE — DISCHARGE NOTE ADULT - MEDICATION SUMMARY - MEDICATIONS TO CHANGE
I will SWITCH the dose or number of times a day I take the medications listed below when I get home from the hospital:    dilTIAZem 30 mg oral tablet  -- 1 tab(s) by mouth every 6 hours

## 2018-11-01 NOTE — PHYSICAL THERAPY INITIAL EVALUATION ADULT - ACTIVE RANGE OF MOTION EXAMINATION, REHAB EVAL
L shoulder to 90* due to c/o pain from "sleeping on it" R residual limb AROM WFLs/bilateral upper extremity Active ROM was WFL (within functional limits)/Left LE Active ROM was WFL (within functional limits)

## 2018-11-01 NOTE — PROGRESS NOTE ADULT - ASSESSMENT
84 y/o M with with ARYA/CKD remains HD dependent with SSCP with ST elevation in infolateral pattern.  LHC, CK, troponin negative with now recurrance of CP worse on inspiration.    New onset of left> right wrist pain with limited ROM.    PLAN  - ESRD: hd today and will plan for 3x per hd this week and monitor response  - AOCD with hx of GI bleed epo with HD   - Chest pain with ST elevation: with recurrence of pain on ranexa ? pericarditis  DW Dr Contreras will plan for ECHO and will send out ESR etc to be drawn at HD today.      10/30  s/p HD   cardiology input noted  esr crp elevated  cardiac enzymes neg    10/31 SY  --ESRD ; noted with increased BUN and creat compared to past few weeks.  Will change to TIW HD schedule.    HD for 2 1/2 hours today for 1 kg UF goal.  --A fib : HR controlled.  Noted for persistent st elevation.    Check Echo.  On exam, no rub to indicate pericarditis.    11/1 SY  --ESRD  : now on TIW HD.  --A fib : rated controlled.  --Echo with minimal amount of pericardial effusion.  Continue with increased dose of HD.  --From renal standpoint, ok for d/c.

## 2018-11-01 NOTE — DISCHARGE NOTE ADULT - CARE PLAN
Principal Discharge DX:	Chest pain, unspecified type  Goal:	Stable patient  Assessment and plan of treatment:	Ranolazine added.  Follow-up with cardiology within 1 week.  Take atorvastatin in stead of simvastatin.  Let primary doctor or emergency services know if pain re-occurs.  Pain resolved now, has left arm pain which is chronic and unrelated  Cardiac cath showed clear stents.  Secondary Diagnosis:	Chronic atrial fibrillation  Goal:	Stable.  Assessment and plan of treatment:	Continue coumadin.  Follow-up with cardiology and primary care physician.  Check INR levels routinely.  Secondary Diagnosis:	Chronic kidney disease, unspecified CKD stage  Goal:	Stable patient  Assessment and plan of treatment:	Continue with dialysis per schedule.   Takes procrit with dialysis for chronic anemia.  Follow-up with nephrology. Principal Discharge DX:	Chest pain, unspecified type  Goal:	Stable patient  Assessment and plan of treatment:	Ranolazine added.  Follow-up with cardiology within 1 week.  Take atorvastatin in stead of simvastatin.  Let primary doctor or emergency services know if pain re-occurs.  Pain resolved now, has left arm pain which is chronic and unrelated  Cardiac cath showed clear stents.  Secondary Diagnosis:	Chronic atrial fibrillation  Goal:	Stable.  Assessment and plan of treatment:	Continue coumadin.  Follow-up with cardiology and primary care physician.  Check INR levels routinely.  Secondary Diagnosis:	Chronic kidney disease, unspecified CKD stage  Goal:	Stable patient  Assessment and plan of treatment:	Continue with dialysis per schedule.   Takes procrit with dialysis for chronic anemia.  Follow-up with nephrology.  Secondary Diagnosis:	COPD (chronic obstructive pulmonary disease)  Assessment and plan of treatment:	Continue inhaler medications

## 2018-11-01 NOTE — PHYSICAL THERAPY INITIAL EVALUATION ADULT - MODALITIES TREATMENT COMMENTS
Patient left OOB in chair with CBIR., daughter in room.  O2 off with sats at 100% on room air throughout session.  All ICU monitors intact. RN informed of session.  Patient appears to be at baseline LOF of max A without the slideboard.

## 2018-11-01 NOTE — PHYSICAL THERAPY INITIAL EVALUATION ADULT - GENERAL OBSERVATIONS, REHAB EVAL
Patient received in bed in CCU, daughter at bedside.  +O2@2L via nc, +ICU monitors. L LE wrapped in gauze. +contact isolation.

## 2018-11-01 NOTE — PROGRESS NOTE ADULT - SUBJECTIVE AND OBJECTIVE BOX
HPI-  Patient is a 83y old  Male who presents with a chief complaint of chest discomfort started in the  morning, on and off getting worse toward afternoon.  In ER ST changes were noted in the II/III/ and AVF  patient was taken to cath lab, [facility] revealing patent LAD LCx and RCA with patent stents, moderate ISR in RCA stent.      10/30- Pt developed Afib with RVR yesterday and was started on cardizem drip.  He did not have any CP at HR of 160/min.  He still c/o L arm pain.      PAST MEDICAL & SURGICAL HISTORY:  Above knee amputation of right lower extremity  Recurrent Clostridium difficile diarrhea  Diastolic CHF  Peripheral vascular disease  Afib  Anemia  CKD (chronic kidney disease)  COPD (chronic obstructive pulmonary disease)  SHAYY (obstructive sleep apnea)  Sepsis, due to unspecified organism: 2/2 poorly healing wounds b/l  Dyspepsia: On moderate exertion.  Sleep apnea, obstructive: Requires home 02 therapy, and treatment with BIPAP  Atelectasis  Pleural effusion, bilateral  Respiratory failure  Peripheral edema  CRI (chronic renal insufficiency)  Gout  Benign prostatic hypertrophy  Spinal stenosis  Hypercholesterolemia  GERD (gastroesophageal reflux disease)  CAD (coronary artery disease)  Hypertension  S/P angioplasty with stent  Cataract of left eye  Prostate: Surgery green light procedure.  S/P rotator cuff surgery: Right  S/P angioplasty      FAMILY HISTORY:  Family history of colorectal cancer (Father)  Family history of diabetes mellitus (Mother, Sibling)  Family history of hypertension (Mother)      SOCIAL HISTORY: no smoking in recent past. no alcohol use           Vital Signs Last 24 Hrs  T(C): 37.3 (28 Oct 2018 16:20), Max: 37.3 (28 Oct 2018 16:20)  T(F): 99.2 (28 Oct 2018 16:20), Max: 99.2 (28 Oct 2018 16:20)  HR: 104 (28 Oct 2018 16:20) (104 - 104)  BP: 121/42 (28 Oct 2018 16:20) (121/42 - 121/42)  BP(mean): --  RR: 16 (28 Oct 2018 16:20) (16 - 16)  SpO2: 99% (28 Oct 2018 16:20) (99% - 99%)    PHYSICAL EXAM-    Constitutional: The patient is in no acute distress    Head: Head is normocephalic and atraumatic.      Neck: no JVD    Cardiovascular: Regular rate and rhythm without S3, S4. systolic murmur    Respiratory:  The patient has no rales and no rhonchi. The patient has no wheezes, HD catheter.     Abdomen: Soft, nontender, nondistended with positive bowel sounds.      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   ECG: sinus rythm, isolated PVCs, PACs, ST elevation in inferior leads.     I&O's Detail      LABS:                        9.6    8.49  )-----------( 225      ( 28 Oct 2018 16:24 )             31.4     10-28    141  |  107  |  62<H>  ----------------------------<  113<H>  4.2   |  22  |  3.84<H>    Ca    8.3<L>      28 Oct 2018 16:24    TPro  6.6  /  Alb  2.5<L>  /  TBili  0.3  /  DBili  x   /  AST  11<L>  /  ALT  25  /  AlkPhos  69  10-28    CARDIAC MARKERS ( 28 Oct 2018 16:24 )  <0.015 ng/mL / x     / 47 U/L / x     / x          PT/INR - ( 28 Oct 2018 16:24 )   PT: 25.2 sec;   INR: 2.29 ratio         PTT - ( 28 Oct 2018 16:24 )  PTT:39.4 sec    I&O's Summary    BNP  RADIOLOGY & ADDITIONAL STUDIES:
HPI-  Patient is a 83y old  Male who presents with a chief complaint of chest discomfort started in the  morning, on and off getting worse toward afternoon.  In ER ST changes were noted in the II/III/ and AVF  patient was taken to cath lab, [facility] revealing patent LAD LCx and RCA with patent stents, moderate ISR in RCA stent.      10/30- Pt developed Afib with RVR yesterday and was started on cardizem drip.  He did not have any CP at HR of 160/min.  He still c/o L arm pain.    10/31- Pt seen and examined by me today. He denies any symptoms including CP or SOB.     11/1- pt seen and examined by me today. Pt denies any CP or SOB.  L arm pain still present.     PAST MEDICAL & SURGICAL HISTORY:  Above knee amputation of right lower extremity  Recurrent Clostridium difficile diarrhea  Diastolic CHF  Peripheral vascular disease  Afib  Anemia  CKD (chronic kidney disease)  COPD (chronic obstructive pulmonary disease)  SHAYY (obstructive sleep apnea)  Sepsis, due to unspecified organism: 2/2 poorly healing wounds b/l  Dyspepsia: On moderate exertion.  Sleep apnea, obstructive: Requires home 02 therapy, and treatment with BIPAP  Atelectasis  Pleural effusion, bilateral  Respiratory failure  Peripheral edema  CRI (chronic renal insufficiency)  Gout  Benign prostatic hypertrophy  Spinal stenosis  Hypercholesterolemia  GERD (gastroesophageal reflux disease)  CAD (coronary artery disease)  Hypertension  S/P angioplasty with stent  Cataract of left eye  Prostate: Surgery green light procedure.  S/P rotator cuff surgery: Right  S/P angioplasty      FAMILY HISTORY:  Family history of colorectal cancer (Father)  Family history of diabetes mellitus (Mother, Sibling)  Family history of hypertension (Mother)      SOCIAL HISTORY: no smoking in recent past. no alcohol use           Vital Signs Last 24 Hrs  T(C): 37.3 (28 Oct 2018 16:20), Max: 37.3 (28 Oct 2018 16:20)  T(F): 99.2 (28 Oct 2018 16:20), Max: 99.2 (28 Oct 2018 16:20)  HR: 104 (28 Oct 2018 16:20) (104 - 104)  BP: 121/42 (28 Oct 2018 16:20) (121/42 - 121/42)  BP(mean): --  RR: 16 (28 Oct 2018 16:20) (16 - 16)  SpO2: 99% (28 Oct 2018 16:20) (99% - 99%)    PHYSICAL EXAM-    Constitutional: The patient is in no acute distress    Head: Head is normocephalic and atraumatic.      Neck: no JVD    Cardiovascular: Regular rate and rhythm without S3, S4. systolic murmur    Respiratory:  The patient has no rales and no rhonchi. The patient has no wheezes, HD catheter.     Abdomen: Soft, nontender, nondistended with positive bowel sounds.      Extremity: Amputated R leg, Fistula on R arm           INTERPRETATION OF TELEMETRY: sinus rythm.  ECG: sinus rythm, isolated PVCs, PACs, ST elevation in inferior leads.     I&O's Detail      LABS:                        9.6    8.49  )-----------( 225      ( 28 Oct 2018 16:24 )             31.4     10-28    141  |  107  |  62<H>  ----------------------------<  113<H>  4.2   |  22  |  3.84<H>    Ca    8.3<L>      28 Oct 2018 16:24    TPro  6.6  /  Alb  2.5<L>  /  TBili  0.3  /  DBili  x   /  AST  11<L>  /  ALT  25  /  AlkPhos  69  10-28    CARDIAC MARKERS ( 28 Oct 2018 16:24 )  <0.015 ng/mL / x     / 47 U/L / x     / x          PT/INR - ( 28 Oct 2018 16:24 )   PT: 25.2 sec;   INR: 2.29 ratio         PTT - ( 28 Oct 2018 16:24 )  PTT:39.4 sec    I&O's Summary    BNP  RADIOLOGY & ADDITIONAL STUDIES:
HPI-  Patient is a 83y old  Male who presents with a chief complaint of chest discomfort started in the  morning, on and off getting worse toward afternoon.  In ER ST changes were noted in the II/III/ and AVF  patient was taken to cath lab, [facility] revealing patent LAD LCx and RCA with patent stents, moderate ISR in RCA stent.          PAST MEDICAL & SURGICAL HISTORY:  Above knee amputation of right lower extremity  Recurrent Clostridium difficile diarrhea  Diastolic CHF  Peripheral vascular disease  Afib  Anemia  CKD (chronic kidney disease)  COPD (chronic obstructive pulmonary disease)  SHAYY (obstructive sleep apnea)  Sepsis, due to unspecified organism: 2/2 poorly healing wounds b/l  Dyspepsia: On moderate exertion.  Sleep apnea, obstructive: Requires home 02 therapy, and treatment with BIPAP  Atelectasis  Pleural effusion, bilateral  Respiratory failure  Peripheral edema  CRI (chronic renal insufficiency)  Gout  Benign prostatic hypertrophy  Spinal stenosis  Hypercholesterolemia  GERD (gastroesophageal reflux disease)  CAD (coronary artery disease)  Hypertension  S/P angioplasty with stent  Cataract of left eye  Prostate: Surgery green light procedure.  S/P rotator cuff surgery: Right  S/P angioplasty      FAMILY HISTORY:  Family history of colorectal cancer (Father)  Family history of diabetes mellitus (Mother, Sibling)  Family history of hypertension (Mother)      SOCIAL HISTORY: no smoking in recent past. no alcohol use           Vital Signs Last 24 Hrs  T(C): 37.3 (28 Oct 2018 16:20), Max: 37.3 (28 Oct 2018 16:20)  T(F): 99.2 (28 Oct 2018 16:20), Max: 99.2 (28 Oct 2018 16:20)  HR: 104 (28 Oct 2018 16:20) (104 - 104)  BP: 121/42 (28 Oct 2018 16:20) (121/42 - 121/42)  BP(mean): --  RR: 16 (28 Oct 2018 16:20) (16 - 16)  SpO2: 99% (28 Oct 2018 16:20) (99% - 99%)    PHYSICAL EXAM-    Constitutional: The patient is in no acute distress    Head: Head is normocephalic and atraumatic.      Neck: no JVD    Cardiovascular: Regular rate and rhythm without S3, S4. systolic murmur    Respiratory:  The patient has no rales and no rhonchi. The patient has no wheezes, HD catheter.     Abdomen: Soft, nontender, nondistended with positive bowel sounds.      Extremity: Amputated R leg, Fistula on R arm           INTERPRETATION OF TELEMETRY: sinus rythm.     ECG: sinus rythm, isolated PVCs, PACs, ST elevation in inferior leads.     I&O's Detail      LABS:                        9.6    8.49  )-----------( 225      ( 28 Oct 2018 16:24 )             31.4     10-28    141  |  107  |  62<H>  ----------------------------<  113<H>  4.2   |  22  |  3.84<H>    Ca    8.3<L>      28 Oct 2018 16:24    TPro  6.6  /  Alb  2.5<L>  /  TBili  0.3  /  DBili  x   /  AST  11<L>  /  ALT  25  /  AlkPhos  69  10-28    CARDIAC MARKERS ( 28 Oct 2018 16:24 )  <0.015 ng/mL / x     / 47 U/L / x     / x          PT/INR - ( 28 Oct 2018 16:24 )   PT: 25.2 sec;   INR: 2.29 ratio         PTT - ( 28 Oct 2018 16:24 )  PTT:39.4 sec    I&O's Summary    BNP  RADIOLOGY & ADDITIONAL STUDIES:
HPI:  83 y.o. male PMH CAD s/p stents, AFib, diastolic CHF, hx of upper GI bleeding, PVD, hypertension, severe COPD (O2 dependent), SHAYY on bipap at night, ESRD on HD (MWF), recurrent c. diff presents with chest pain since this AM. Pt reports sharp, substernal, nonradiating, moderate intensity, on and off. Pt denies fever, chills, SOB, abd pain, dysuria, or diarrhea. Pt was found to have STEMI inferior lead. Pt was taken to cardiac cath for LHC and without any occlusion. Dr Wolff's note noted. Pt reports continued pain since the cath. (28 Oct 2018 19:15)      Review of Systems:  CONSTITUTIONAL: No weakness, fevers or chills  EYES/ENT: No visual changes;  No vertigo or throat pain   NECK: No pain or stiffness  RESPIRATORY: No cough, wheezing, hemoptysis; No shortness of breath,   CARDIOVASCULAR: chest pain present or palpitations  GASTROINTESTINAL: No abdominal or epigastric pain. No nausea, vomiting, or hematemesis; No diarrhea or constipation.   GENITOURINARY: No dysuria, frequency or hematuria  NEUROLOGICAL: No numbness or weakness  SKIN: No itching, burning, rashes, or lesions   All other review of systems is negative unless indicated above    PHYSICAL EXAM:    Vital Signs Last 24 Hrs  T(C): 36.8 (29 Oct 2018 16:11), Max: 37 (29 Oct 2018 08:43)  T(F): 98.3 (29 Oct 2018 16:11), Max: 98.6 (29 Oct 2018 08:43)  HR: 89 (29 Oct 2018 12:00) (74 - 102)  BP: 125/43 (29 Oct 2018 12:00) (113/45 - 181/61)  BP(mean): 64 (29 Oct 2018 12:00) (57 - 97)  RR: 14 (29 Oct 2018 12:00) (11 - 28)  SpO2: 100% (29 Oct 2018 12:00) (99% - 100%)    GENERAL: comfortable   HEAD:  Atraumatic, Normocephalic  EYES: EOMI, PERRLA, conjunctiva and sclera clear  HEENT: Moist mucous membranes  NECK: Supple, No JVD  NERVOUS SYSTEM:  Alert & Oriented X3, Motor Strength 5/5 B/L upper and lower extremities; DTRs 2+ intact and symmetric  CHEST/LUNG: Clear to auscultation bilaterally; No rales, rhonchi, wheezing, or rubs  HEART:S1S2 normal, no murmer  ABDOMEN: Soft, Nontender, Nondistended; Bowel sounds present  GENITOURINARY- Voiding, no palpable bladder  MUSCULOSKELTAL- No muscle tenderness, Muscle tone normal,   SKIN-no rash, no lesion  PSYCH- Mood stable  LYMPH Node- No palpable lymph node    LABS:                        9.2    8.02  )-----------( 196      ( 29 Oct 2018 05:56 )             29.5     10-29    140  |  107  |  69<H>  ----------------------------<  85  4.2   |  20<L>  |  3.68<H>    Ca    8.3<L>      29 Oct 2018 05:56    TPro  6.6  /  Alb  2.5<L>  /  TBili  0.3  /  DBili  x   /  AST  11<L>  /  ALT  25  /  AlkPhos  69  10-28    PT/INR - ( 29 Oct 2018 05:56 )   PT: 28.0 sec;   INR: 2.54 ratio         PTT - ( 28 Oct 2018 16:24 )  PTT:39.4 sec      CAPILLARY BLOOD GLUCOSE          CARDIAC MARKERS ( 29 Oct 2018 00:04 )  0.026 ng/mL / x     / x     / x     / x      CARDIAC MARKERS ( 28 Oct 2018 20:00 )  <0.015 ng/mL / x     / x     / x     / x      CARDIAC MARKERS ( 28 Oct 2018 16:24 )  <0.015 ng/mL / x     / 47 U/L / x     / x            Standing medicine  acetaminophen   Tablet .. 650 milliGRAM(s) Oral every 6 hours PRN  allopurinol 100 milliGRAM(s) Oral daily  aspirin  chewable 81 milliGRAM(s) Oral daily  buDESOnide   0.5 milliGRAM(s) Respule 0.5 milliGRAM(s) Inhalation two times a day  cholecalciferol 1000 Unit(s) Oral daily  cholestyramine Powder (Sugar-Free) 4 Gram(s) Oral daily  diltiazem Infusion 5 mG/Hr IV Continuous <Continuous>  doxazosin 8 milliGRAM(s) Oral at bedtime  epoetin nelly Injectable 26142 Unit(s) IV Push <User Schedule>  epoetin nelly Injectable 3000 Unit(s) IV Push <User Schedule>  finasteride 5 milliGRAM(s) Oral daily  fluticasone propionate 50 MICROgram(s)/spray Nasal Spray 1 Spray(s) Both Nostrils <User Schedule>  loratadine 10 milliGRAM(s) Oral daily  pantoprazole    Tablet 40 milliGRAM(s) Oral before breakfast  ranolazine 500 milliGRAM(s) Oral two times a day  vancomycin    Solution 125 milliGRAM(s) Oral every 6 hours  warfarin 3 milliGRAM(s) Oral at bedtime
HPI:  83 y.o. male PMH CAD s/p stents, AFib, diastolic CHF, hx of upper GI bleeding, PVD, hypertension, severe COPD (O2 dependent), SHAYY on bipap at night, ESRD on HD (MWF), recurrent c. diff presents with chest pain. Pt reports sharp, substernal, non radiating moderate intensity, on and off. Pt denies fever, chills, SOB, abd pain, dysuria, or diarrhea. Pt was found to have STEMI inferior lead. Pt was taken to cardiac cath for LHC and without any occlusion.     Review of Systems:  CONSTITUTIONAL: No weakness, fevers or chills  EYES/ENT: No visual changes;  No vertigo or throat pain   NECK: No pain or stiffness  RESPIRATORY: No cough, wheezing, hemoptysis; No shortness of breath,   CARDIOVASCULAR: chest pain present or palpitations  GASTROINTESTINAL: No abdominal or epigastric pain. No nausea, vomiting, or hematemesis; No diarrhea or constipation.   GENITOURINARY: No dysuria, frequency or hematuria  NEUROLOGICAL: No numbness or weakness  SKIN: No itching, burning, rashes, or lesions   All other review of systems is negative unless indicated above    PHYSICAL EXAM:    Vital Signs Last 24 Hrs  T(C): 36.8 (31 Oct 2018 14:18), Max: 36.9 (30 Oct 2018 20:59)  T(F): 98.2 (31 Oct 2018 14:18), Max: 98.5 (30 Oct 2018 20:59)  HR: 79 (31 Oct 2018 13:50) (66 - 84)  BP: 133/42 (31 Oct 2018 13:50) (97/40 - 142/48)  BP(mean): 60 (31 Oct 2018 13:00) (48 - 71)  RR: 18 (31 Oct 2018 13:50) (15 - 26)  SpO2: 100% (31 Oct 2018 13:00) (94% - 100%)      GENERAL: comfortable   HEAD:  Atraumatic, Normocephalic  EYES: EOMI, PERRLA, conjunctiva and sclera clear  HEENT: Moist mucous membranes  NECK: Supple, No JVD  NERVOUS SYSTEM:  Alert & Oriented X3, Motor Strength 5/5 B/L upper and lower extremities; DTRs 2+ intact and symmetric  CHEST/LUNG: Clear to auscultation bilaterally; No rales, rhonchi, wheezing, or rubs  HEART:S1S2 normal, no murmur  ABDOMEN: Soft, Nontender, Nondistended; Bowel sounds present  GENITOURINARY- Voiding, no palpable bladder  MUSCULOSKELETAL No muscle tenderness, Muscle tone normal,   SKIN-wound present in left leg   PSYCH- Mood stable  LYMPH Node- No palpable lymph node                          9.6    5.25  )-----------( 224      ( 31 Oct 2018 11:15 )             31.2     10-31    141  |  106  |  46<H>  ----------------------------<  133<H>  4.1   |  25  |  3.44<H>    Ca    8.2<L>      31 Oct 2018 11:15  Phos  5.3     10-31    TPro  x   /  Alb  2.3<L>  /  TBili  x   /  DBili  x   /  AST  x   /  ALT  x   /  AlkPhos  x   10-31    LIVER FUNCTIONS - ( 31 Oct 2018 11:15 )  Alb: 2.3 g/dL / Pro: x     / ALK PHOS: x     / ALT: x     / AST: x     / GGT: x             PT/INR - ( 31 Oct 2018 11:15 )   PT: 33.0 sec;   INR: 2.88 ratio               PT/INR - ( 31 Oct 2018 11:15 )   PT: 33.0 sec;   INR: 2.88 ratio           CAPILLARY BLOOD GLUCOSE          MEDICATIONS  (STANDING):  allopurinol 100 milliGRAM(s) Oral daily  aspirin  chewable 81 milliGRAM(s) Oral daily  atorvastatin 20 milliGRAM(s) Oral at bedtime  buDESOnide   0.5 milliGRAM(s) Respule 0.5 milliGRAM(s) Inhalation two times a day  cholecalciferol 1000 Unit(s) Oral daily  cholestyramine Powder (Sugar-Free) 4 Gram(s) Oral daily  diltiazem    milliGRAM(s) Oral daily  doxazosin 8 milliGRAM(s) Oral at bedtime  epoetin nelly Injectable 05223 Unit(s) IV Push <User Schedule>  epoetin nelly Injectable 3000 Unit(s) IV Push <User Schedule>  finasteride 5 milliGRAM(s) Oral daily  fluticasone propionate 50 MICROgram(s)/spray Nasal Spray 1 Spray(s) Both Nostrils <User Schedule>  loratadine 10 milliGRAM(s) Oral daily  pantoprazole    Tablet 40 milliGRAM(s) Oral before breakfast  ranolazine 500 milliGRAM(s) Oral two times a day  sevelamer hydrochloride 800 milliGRAM(s) Oral three times a day with meals  silver sulfADIAZINE 1% Cream 1 Application(s) Topical <User Schedule>  warfarin 3 milliGRAM(s) Oral at bedtime    MEDICATIONS  (PRN):  acetaminophen   Tablet .. 650 milliGRAM(s) Oral every 6 hours PRN Temp greater or equal to 38C (100.4F), Mild Pain (1 - 3)
NEPHROLOGY INTERVAL HPI/OVERNIGHT EVENTS:    Date of Service: 10-31-18 @ 08:53  10/31 SY  No acute events overnight.  HR better controlled now --off IV cardizem.  Denies chest pain or SOB.  Much improved pain in hands.  Notes improved edema in hands.    84 y/o M with hx of leila/ckd HD depended presented with SSCP since the AM.  EKG revealed ST elevation in II/III/AVF.  LHC cath done last PM and was negative.  Pt CP free since this AM but again with chest pain with inspiration that spontaneouly resolved   Recently since mid last week with pain in b/l hand/wrist and now localized to left hand/wrist with associated swelling.  Limited ROM of left wrist with pain noted on internal rotation with radiation of his pain UP his left arm.   NO n/v/d  recently at  for ESBL in urine, tx  hx of cdiff  AVF in rt arm placed during this admission   HPI:  83 y.o. male PMH CAD s/p stents, AFib, diastolic CHF, hx of upper GI bleeding, PVD, hypertension, severe COPD (O2 dependent), SHAYY on bipap at night, ESRD on HD (MWF), recurrent c. diff presents with chest pain since this AM. Pt reports sharp, substernal, nonradiating, moderate intensity, on and off. Pt denies fever, chills, SOB, abd pain, dysuria, or diarrhea. Pt was found to have STEMI inferior lead. Pt was taken to cardiac cath for LHC and without any occlusion. Dr Wolff's note noted. Pt reports continued pain since the cath. (28 Oct 2018 19:15)      MEDICATIONS  (STANDING):  allopurinol 100 milliGRAM(s) Oral daily  aspirin  chewable 81 milliGRAM(s) Oral daily  atorvastatin 20 milliGRAM(s) Oral at bedtime  buDESOnide   0.5 milliGRAM(s) Respule 0.5 milliGRAM(s) Inhalation two times a day  cholecalciferol 1000 Unit(s) Oral daily  cholestyramine Powder (Sugar-Free) 4 Gram(s) Oral daily  diltiazem    milliGRAM(s) Oral daily  doxazosin 8 milliGRAM(s) Oral at bedtime  epoetin nelly Injectable 73941 Unit(s) IV Push <User Schedule>  epoetin nelly Injectable 3000 Unit(s) IV Push <User Schedule>  finasteride 5 milliGRAM(s) Oral daily  fluticasone propionate 50 MICROgram(s)/spray Nasal Spray 1 Spray(s) Both Nostrils <User Schedule>  loratadine 10 milliGRAM(s) Oral daily  pantoprazole    Tablet 40 milliGRAM(s) Oral before breakfast  ranolazine 500 milliGRAM(s) Oral two times a day  silver sulfADIAZINE 1% Cream 1 Application(s) Topical <User Schedule>  warfarin 3 milliGRAM(s) Oral at bedtime    MEDICATIONS  (PRN):  acetaminophen   Tablet .. 650 milliGRAM(s) Oral every 6 hours PRN Temp greater or equal to 38C (100.4F), Mild Pain (1 - 3)          Vital Signs Last 24 Hrs  T(C): 36.9 (31 Oct 2018 05:00), Max: 37.1 (30 Oct 2018 11:52)  T(F): 98.5 (31 Oct 2018 05:00), Max: 98.8 (30 Oct 2018 11:52)  HR: 74 (31 Oct 2018 08:32) (65 - 90)  BP: 112/32 (31 Oct 2018 08:00) (96/37 - 133/44)  BP(mean): 53 (31 Oct 2018 08:00) (48 - 70)  RR: 17 (31 Oct 2018 08:00) (15 - 24)  SpO2: 100% (31 Oct 2018 08:00) (94% - 100%)  Daily     Daily Weight in k (31 Oct 2018 05:00)      PHYSICAL EXAM:  Alert and appropriate  GENERAL: no distress  CHEST/LUNG: fair air entry  HEART: S1S2 , no rub  ABDOMEN: soft  EXTREMITIES: decreased edema in hands and LLE.  SKIN:     LABS:                        9.4    7.30  )-----------( 245      ( 30 Oct 2018 08:00 )             30.3     10-30    140  |  106  |  82<H>  ----------------------------<  95  4.7   |  20<L>  |  4.45<H>    Ca    8.7      30 Oct 2018 08:00  Phos  7.5     10-30    TPro  x   /  Alb  2.2<L>  /  TBili  x   /  DBili  x   /  AST  x   /  ALT  x   /  AlkPhos  x   10-30    PT/INR - ( 30 Oct 2018 14:30 )   PT: 34.8 sec;   INR: 3.03 ratio                     RADIOLOGY & ADDITIONAL TESTS:
NEPHROLOGY INTERVAL HPI/OVERNIGHT EVENTS:    Date of Service: 18 @ 10:28   SY  Feeling well.  No new complaints.  No SOB or chest discomfort.    10/31 SY  No acute events overnight.  HR better controlled now --off IV cardizem.  Denies chest pain or SOB.  Much improved pain in hands.  Notes improved edema in hands.    84 y/o M with hx of leila/ckd HD depended presented with SSCP since the AM.  EKG revealed ST elevation in II/III/AVF.  LHC cath done last PM and was negative.  Pt CP free since this AM but again with chest pain with inspiration that spontaneouly resolved   Recently since mid last week with pain in b/l hand/wrist and now localized to left hand/wrist with associated swelling.  Limited ROM of left wrist with pain noted on internal rotation with radiation of his pain UP his left arm.   NO n/v/d  recently at  for ESBL in urine, tx  hx of cdiff  AVF in rt arm placed during this admission     HPI:  83 y.o. male PMH CAD s/p stents, AFib, diastolic CHF, hx of upper GI bleeding, PVD, hypertension, severe COPD (O2 dependent), SHAYY on bipap at night, ESRD on HD (MWF), recurrent c. diff presents with chest pain since this AM. Pt reports sharp, substernal, nonradiating, moderate intensity, on and off. Pt denies fever, chills, SOB, abd pain, dysuria, or diarrhea. Pt was found to have STEMI inferior lead. Pt was taken to cardiac cath for LHC and without any occlusion. Dr Wolff's note noted. Pt reports continued pain since the cath. (28 Oct 2018 19:15)      MEDICATIONS  (STANDING):  allopurinol 100 milliGRAM(s) Oral daily  aspirin  chewable 81 milliGRAM(s) Oral daily  atorvastatin 20 milliGRAM(s) Oral at bedtime  buDESOnide   0.5 milliGRAM(s) Respule 0.5 milliGRAM(s) Inhalation two times a day  cholecalciferol 1000 Unit(s) Oral daily  cholestyramine Powder (Sugar-Free) 4 Gram(s) Oral daily  diltiazem    milliGRAM(s) Oral daily  doxazosin 8 milliGRAM(s) Oral at bedtime  epoetin nelly Injectable 11714 Unit(s) IV Push <User Schedule>  epoetin nelly Injectable 3000 Unit(s) IV Push <User Schedule>  finasteride 5 milliGRAM(s) Oral daily  fluticasone propionate 50 MICROgram(s)/spray Nasal Spray 1 Spray(s) Both Nostrils <User Schedule>  loratadine 10 milliGRAM(s) Oral daily  pantoprazole    Tablet 40 milliGRAM(s) Oral before breakfast  ranolazine 500 milliGRAM(s) Oral two times a day  sevelamer hydrochloride 800 milliGRAM(s) Oral three times a day with meals  silver sulfADIAZINE 1% Cream 1 Application(s) Topical <User Schedule>  warfarin 3 milliGRAM(s) Oral at bedtime    MEDICATIONS  (PRN):  acetaminophen   Tablet .. 650 milliGRAM(s) Oral every 6 hours PRN Temp greater or equal to 38C (100.4F), Mild Pain (1 - 3)          Vital Signs Last 24 Hrs  T(C): 36.9 (2018 08:12), Max: 36.9 (31 Oct 2018 21:00)  T(F): 98.4 (2018 08:12), Max: 98.5 (31 Oct 2018 21:00)  HR: 74 (2018 06:00) (67 - 83)  BP: 115/32 (2018 06:00) (115/32 - 154/48)  BP(mean): 52 (2018 06:00) (52 - 78)  RR: 10 (2018 06:00) (10 - 26)  SpO2: 100% (2018 06:00) (100% - 100%)  Daily     Daily Weight in k.4 (31 Oct 2018 10:41)      PHYSICAL EXAM:  Alert and comfortable  GENERAL: no distress  CHEST/LUNG: clear to aus  HEART: S1S2 RRR  ABDOMEN: soft  EXTREMITIES: decreased edema  SKIN:     LABS:                        9.6    5.25  )-----------( 224      ( 31 Oct 2018 11:15 )             31.2     10-    141  |  106  |  46<H>  ----------------------------<  133<H>  4.1   |  25  |  3.44<H>    Ca    8.2<L>      31 Oct 2018 11:15  Phos  5.3     10-31    TPro  x   /  Alb  2.3<L>  /  TBili  x   /  DBili  x   /  AST  x   /  ALT  x   /  AlkPhos  x   10-31    PT/INR - ( 2018 04:10 )   PT: 35.7 sec;   INR: 3.11 ratio             Phosphorus Level, Serum: 5.3 mg/dL (10-31 @ 11:15)          RADIOLOGY & ADDITIONAL TESTS:      < from: Transthoracic Echocardiogram (10.31.18 @ 10:22) >   EXAM:  ECHO TTE WO CON COMP W DOP         PROCEDURE DATE:  10/31/2018        INTERPRETATION:  Transthoracic Echocardiography Report (TTE)     Demographics     Patient name        TERRI NEWTON      Age           83 year(s)     Med Rec #           [id number]         Gender        Male     Account #           [id number]           Date of Birth 1934     Interpreting        Chas Mckenzie,   Room Number   0033   Physician           MD Chas Mckenzie MD     Referring Physician SUK-HYEON YUN MD  Sonographer   Opal Otto,                                                       [facility] [[email or web address]]     Date of study       10/31/2018 09:55                       AM     Height              69 in             Weight     143.3 pounds    Type of Study:     TTE procedure: ECHO TTE WO CON COMP W DOP     BP: 133/40 mmHg     Study Location: [facility] Quality: Fair    Indications   1) I31.3 - Pericardial effusion noninflammatory    M-Mode Measurements (cm)     LVEDd: 5.57 cm            LVESd: 3.34 cm   IVSEd: 1.3 cm   LVPWd: 1.2 cm             AO Root Dimension: 3.5 cm                             ACS: 2.2 cm    Doppler Measurements:      MV Peak E-Wave: 98.2 cm/s    MV Peak A-Wave: 115 cm/s    MV E/A Ratio: 0.85 %    MV Peak Gradient: 3.86 mmHg     Findings     Mitral Valve   Normal appearing mitral valve structure and function.   Mild to Moderate mitral regurgitation is present.   Mild mitral annular calcification is present.     Aortic Valve   Mild aorticsclerosis is present with normal valvular opening.     Tricuspid Valve   Normal appearing tricuspid valve structure and function.   Trace tricuspid valve regurgitation is present.     Pulmonic Valve   Normal appearing pulmonic valve structure and function.   Mild pulmonic valvular regurgitation (1+) is present.     Left Atrium   The left atrium is moderately dilated.     Left Ventricle   The left ventricle is normal in size, wall motion and contractility.   Mild concentric left ventricular hypertrophy is present.   Estimated left ventricular ejection fraction is 60-65 %.     Right Atrium   Normal appearing right atrium.     Right Ventricle   Normal appearing right ventricle structure and function.     Pericardial Effusion   Trivial pericardial effusion is present.     Pleural Effusion   No evidence of pleural effusion.     Miscellaneous   All visualized extra cardiac structures appears to be normal.     Impression     Signature     ----------------------------------------------------------------   Electronically signed by Chas Mckenzie MD(Interpreting   physician) on 10/31/2018 12:26 PM   ----------------------------------------------------------------    Valves     Mitral Valve     Peak E-Wave: 98.2 cm/s   Peak A-Wave: 115 cm/s   Peak Gradient: 3.86 mmHg                                                 E/A Ratio: 0.85     Aortic Valve     Cusp Separation: 2.2 cm    Structures     Left Atrium          LA Area: 26.4 cm^2        LA Volume/Index: 85 ml /47m^2     Left Ventricle     Diastolic Dimension: 5.57 cm          Systolic Dimension: 3.34 cm   Septum Diastolic: 1.3 cm   PW Diastolic: 1.2 cm     FS: 40.04 %     Miscellaneous     Aorta     Aortic Root: 3.5 cm                    CHAS MCKENZIE   This document has been electronically signed. Oct 31 2018 12:27PM                < end of copied text >
NEPHROLOGY INTERVAL HPI/OVERNIGHT EVENTS:  ELDA [id number]\  84 y/o M with hx of arya/ckd HD depended presented with SSCP since the AM.  EKG revealed ST elevation in II/III/AVF.  LHC cath done last PM and was negative.  Pt CP free since this AM but again with chest pain with inspiration that spontaneouly resolved   Recently since mid last week with pain in b/l hand/wrist and now localized to left hand/wrist with associated swelling.  Limited ROM of left wrist with pain noted on internal rotation with radiation of his pain UP his left arm.   NO n/v/d  recently at  for ESBL in urine, tx  hx of cdiff  AVF in rt arm placed during this admission   HPI:  83 y.o. male PMH CAD s/p stents, AFib, diastolic CHF, hx of upper GI bleeding, PVD, hypertension, severe COPD (O2 dependent), SHAYY on bipap at night, ESRD on HD (MWF), recurrent c. diff presents with chest pain since this AM. Pt reports sharp, substernal, nonradiating, moderate intensity, on and off. Pt denies fever, chills, SOB, abd pain, dysuria, or diarrhea. Pt was found to have STEMI inferior lead. Pt was taken to cardiac cath for LHC and without any occlusion. Dr Wolff's note noted. Pt reports continued pain since the cath. (28 Oct 2018 19:15)    10/30  s/p HD today  feels well no complaints   labs reviewed      Patient is a 83y old  Male who presents with a chief complaint of chest pain (29 Oct 2018 08:11)      PAST MEDICAL & SURGICAL HISTORY:  - shayy on cpap   - CKD stage 3 ( scr 2- pre-amputation) , now closer to 1.6-1.7/with ARYA HD dependent since july 2018  - chf diastolic   - afib on ac  - DM2  - GI bleed with hx of Dieulafoy clipped in past   - dvt s/p ivc filter  - BPH s/p green light procedure  -gout  - HTN  - cad s/p PCI  (LAD, RCA bare metal around 9/16)  - COPD with O2  - spinal stenosis  - HLD  - GERD  - PAD /PVD s/p rt aka 6/28/17  - s/p rotator cuff tear  -  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,  - cdiff colitis  - ESBL UTI in sept 2018 klebsella uti    FAMILY HISTORY:  Family history of colorectal cancer (Father)  Family history of diabetes mellitus (Mother, Sibling)  Family history of hypertension (Mother)      MEDICATIONS  (STANDING):  allopurinol 100 milliGRAM(s) Oral daily  aspirin  chewable 81 milliGRAM(s) Oral daily  buDESOnide   0.5 milliGRAM(s) Respule 0.5 milliGRAM(s) Inhalation two times a day  cholecalciferol 1000 Unit(s) Oral daily  cholestyramine Powder (Sugar-Free) 4 Gram(s) Oral daily  diltiazem    Tablet 30 milliGRAM(s) Oral every 6 hours  doxazosin 8 milliGRAM(s) Oral at bedtime  finasteride 5 milliGRAM(s) Oral daily  fluticasone propionate 50 MICROgram(s)/spray Nasal Spray 1 Spray(s) Both Nostrils <User Schedule>  loratadine 10 milliGRAM(s) Oral daily  pantoprazole    Tablet 40 milliGRAM(s) Oral before breakfast  ranolazine 500 milliGRAM(s) Oral two times a day  vancomycin    Solution 125 milliGRAM(s) Oral every 6 hours  warfarin 3 milliGRAM(s) Oral at bedtime    MEDICATIONS  (PRN):  acetaminophen   Tablet .. 650 milliGRAM(s) Oral every 6 hours PRN Temp greater or equal to 38C (100.4F), Mild Pain (1 - 3)      Allergies    Zosyn (Rash)    Intolerances        I&O's Detail      MEDICATIONS  (STANDING):  allopurinol 100 milliGRAM(s) Oral daily  aspirin  chewable 81 milliGRAM(s) Oral daily  buDESOnide   0.5 milliGRAM(s) Respule 0.5 milliGRAM(s) Inhalation two times a day  cholecalciferol 1000 Unit(s) Oral daily  cholestyramine Powder (Sugar-Free) 4 Gram(s) Oral daily  diltiazem    milliGRAM(s) Oral daily  doxazosin 8 milliGRAM(s) Oral at bedtime  epoetin nelly Injectable 19720 Unit(s) IV Push <User Schedule>  epoetin nelly Injectable 3000 Unit(s) IV Push <User Schedule>  finasteride 5 milliGRAM(s) Oral daily  fluticasone propionate 50 MICROgram(s)/spray Nasal Spray 1 Spray(s) Both Nostrils <User Schedule>  loratadine 10 milliGRAM(s) Oral daily  pantoprazole    Tablet 40 milliGRAM(s) Oral before breakfast  ranolazine 500 milliGRAM(s) Oral two times a day  silver sulfADIAZINE 1% Cream 1 Application(s) Topical <User Schedule>  warfarin 3 milliGRAM(s) Oral at bedtime    MEDICATIONS  (PRN):  acetaminophen   Tablet .. 650 milliGRAM(s) Oral every 6 hours PRN Temp greater or equal to 38C (100.4F), Mild Pain (1 - 3)        Vital Signs Last 24 Hrs                        9.4    7.30  )-----------( 245      ( 30 Oct 2018 08:00 )             30.3   T(C): 37.1 (30 Oct 2018 11:52), Max: 37.2 (30 Oct 2018 05:00)  T(F): 98.8 (30 Oct 2018 11:52), Max: 98.9 (30 Oct 2018 05:00)  HR: 72 (30 Oct 2018 11:52) (65 - 95)  BP: 128/44 (30 Oct 2018 11:52) (96/37 - 133/44)  BP(mean): 64 (30 Oct 2018 06:00) (56 - 78)  RR: 20 (30 Oct 2018 11:30) (1 - 24)  SpO2: 100% (30 Oct 2018 11:30) (99% - 100%)    PHYSICAL EXAM:  GEN: alert awake O X 3  HEENT: MMM  NECK supple no jvd  CV: RRR s1s2 no rub  LUNGS: b/l CTA  ABD: + soft,   EXT: no edema    LABS:  10-30    140  |  106  |  82<H>  ----------------------------<  95  4.7   |  20<L>  |  4.45<H>    Ca    8.7      30 Oct 2018 08:00  Phos  7.5     10-30    TPro  x   /  Alb  2.2<L>  /  TBili  x   /  DBili  x   /  AST  x   /  ALT  x   /  AlkPhos  x   10-30
Pt has been seen and examined with FP resident, resident supervised agree with a/p       Patient is a 83y old  Male who presents with a chief complaint of chest pain (01 Nov 2018 10:28)      83 y.o. male PMH CAD s/p stents, AFib, diastolic CHF, hx of upper GI bleeding, PVD, hypertension, severe COPD (O2 dependent), SHAYY on bipap at night, ESRD on HD (MWF), recurrent c. diff presents with chest pain      PHYSICAL EXAM:  Vital Signs Last 24 Hrs  T(C): 36.9 (01 Nov 2018 08:12), Max: 36.9 (31 Oct 2018 21:00)  T(F): 98.4 (01 Nov 2018 08:12), Max: 98.5 (31 Oct 2018 21:00)  HR: 84 (01 Nov 2018 10:00) (71 - 84)  BP: 130/41 (01 Nov 2018 10:00) (115/32 - 154/48)  BP(mean): 64 (01 Nov 2018 10:00) (52 - 78)  RR: 21 (01 Nov 2018 10:00) (10 - 26)  SpO2: 100% (01 Nov 2018 10:00) (100% - 100%)  general- comfortable   -rs-aeeb, cta  -cvs-s1s2 normal   -p/a- soft,bs+      A/P    #d/c today with further management as an outpt     #time spent 65 minutes
HPI-  Patient is a 83y old  Male who presents with a chief complaint of chest discomfort started in the  morning, on and off getting worse toward afternoon.  In ER ST changes were noted in the II/III/ and AVF  patient was taken to cath lab, [facility] revealing patent LAD LCx and RCA with patent stents, moderate ISR in RCA stent.      10/30- Pt developed Afib with RVR yesterday and was started on cardizem drip.  He did not have any CP at HR of 160/min.  He still c/o L arm pain.    10/31- Pt seen and examined by me today. He denies any symptoms including CP or SOB.         PAST MEDICAL & SURGICAL HISTORY:  Above knee amputation of right lower extremity  Recurrent Clostridium difficile diarrhea  Diastolic CHF  Peripheral vascular disease  Afib  Anemia  CKD (chronic kidney disease)  COPD (chronic obstructive pulmonary disease)  SHAYY (obstructive sleep apnea)  Sepsis, due to unspecified organism: 2/2 poorly healing wounds b/l  Dyspepsia: On moderate exertion.  Sleep apnea, obstructive: Requires home 02 therapy, and treatment with BIPAP  Atelectasis  Pleural effusion, bilateral  Respiratory failure  Peripheral edema  CRI (chronic renal insufficiency)  Gout  Benign prostatic hypertrophy  Spinal stenosis  Hypercholesterolemia  GERD (gastroesophageal reflux disease)  CAD (coronary artery disease)  Hypertension  S/P angioplasty with stent  Cataract of left eye  Prostate: Surgery green light procedure.  S/P rotator cuff surgery: Right  S/P angioplasty      FAMILY HISTORY:  Family history of colorectal cancer (Father)  Family history of diabetes mellitus (Mother, Sibling)  Family history of hypertension (Mother)      SOCIAL HISTORY: no smoking in recent past. no alcohol use           Vital Signs Last 24 Hrs  T(C): 37.3 (28 Oct 2018 16:20), Max: 37.3 (28 Oct 2018 16:20)  T(F): 99.2 (28 Oct 2018 16:20), Max: 99.2 (28 Oct 2018 16:20)  HR: 104 (28 Oct 2018 16:20) (104 - 104)  BP: 121/42 (28 Oct 2018 16:20) (121/42 - 121/42)  BP(mean): --  RR: 16 (28 Oct 2018 16:20) (16 - 16)  SpO2: 99% (28 Oct 2018 16:20) (99% - 99%)    PHYSICAL EXAM-    Constitutional: The patient is in no acute distress    Head: Head is normocephalic and atraumatic.      Neck: no JVD    Cardiovascular: Regular rate and rhythm without S3, S4. systolic murmur    Respiratory:  The patient has no rales and no rhonchi. The patient has no wheezes, HD catheter.     Abdomen: Soft, nontender, nondistended with positive bowel sounds.      Extremity: Amputated R leg, Fistula on R arm           INTERPRETATION OF TELEMETRY: sinus rythm.  ECG: sinus rythm, isolated PVCs, PACs, ST elevation in inferior leads.     I&O's Detail      LABS:                        9.6    8.49  )-----------( 225      ( 28 Oct 2018 16:24 )             31.4     10-28    141  |  107  |  62<H>  ----------------------------<  113<H>  4.2   |  22  |  3.84<H>    Ca    8.3<L>      28 Oct 2018 16:24    TPro  6.6  /  Alb  2.5<L>  /  TBili  0.3  /  DBili  x   /  AST  11<L>  /  ALT  25  /  AlkPhos  69  10-28    CARDIAC MARKERS ( 28 Oct 2018 16:24 )  <0.015 ng/mL / x     / 47 U/L / x     / x          PT/INR - ( 28 Oct 2018 16:24 )   PT: 25.2 sec;   INR: 2.29 ratio         PTT - ( 28 Oct 2018 16:24 )  PTT:39.4 sec    I&O's Summary    BNP  RADIOLOGY & ADDITIONAL STUDIES:
HPI:  83 y.o. male PMH CAD s/p stents, AFib, diastolic CHF, hx of upper GI bleeding, PVD, hypertension, severe COPD (O2 dependent), SHAYY on bipap at night, ESRD on HD (MWF), recurrent c. diff presents with chest pain. Pt reports sharp, substernal, non radiating moderate intensity, on and off. Pt denies fever, chills, SOB, abd pain, dysuria, or diarrhea. Pt was found to have STEMI inferior lead. Pt was taken to cardiac cath for LHC and without any occlusion. Dr Wolff's note noted.     Review of Systems:  CONSTITUTIONAL: No weakness, fevers or chills  EYES/ENT: No visual changes;  No vertigo or throat pain   NECK: No pain or stiffness  RESPIRATORY: No cough, wheezing, hemoptysis; No shortness of breath,   CARDIOVASCULAR: chest pain present or palpitations  GASTROINTESTINAL: No abdominal or epigastric pain. No nausea, vomiting, or hematemesis; No diarrhea or constipation.   GENITOURINARY: No dysuria, frequency or hematuria  NEUROLOGICAL: No numbness or weakness  SKIN: No itching, burning, rashes, or lesions   All other review of systems is negative unless indicated above    PHYSICAL EXAM:    Vital Signs Last 24 Hrs  T(C): 37.1 (30 Oct 2018 11:52), Max: 37.2 (30 Oct 2018 05:00)  T(F): 98.8 (30 Oct 2018 11:52), Max: 98.9 (30 Oct 2018 05:00)  HR: 72 (30 Oct 2018 11:52) (65 - 95)  BP: 128/44 (30 Oct 2018 11:52) (96/37 - 133/44)  BP(mean): 64 (30 Oct 2018 06:00) (56 - 78)  RR: 20 (30 Oct 2018 11:30) (1 - 24)  SpO2: 100% (30 Oct 2018 11:30) (99% - 100%)    GENERAL: comfortable   HEAD:  Atraumatic, Normocephalic  EYES: EOMI, PERRLA, conjunctiva and sclera clear  HEENT: Moist mucous membranes  NECK: Supple, No JVD  NERVOUS SYSTEM:  Alert & Oriented X3, Motor Strength 5/5 B/L upper and lower extremities; DTRs 2+ intact and symmetric  CHEST/LUNG: Clear to auscultation bilaterally; No rales, rhonchi, wheezing, or rubs  HEART:S1S2 normal, no murmur  ABDOMEN: Soft, Nontender, Nondistended; Bowel sounds present  GENITOURINARY- Voiding, no palpable bladder  MUSCULOSKELETAL No muscle tenderness, Muscle tone normal,   SKIN-no rash, no lesion  PSYCH- Mood stable  LYMPH Node- No palpable lymph node                          9.4    7.30  )-----------( 245      ( 30 Oct 2018 08:00 )             30.3     10-30    140  |  106  |  82<H>  ----------------------------<  95  4.7   |  20<L>  |  4.45<H>    Ca    8.7      30 Oct 2018 08:00  Phos  7.5     10-30    TPro  x   /  Alb  2.2<L>  /  TBili  x   /  DBili  x   /  AST  x   /  ALT  x   /  AlkPhos  x   10-30    LIVER FUNCTIONS - ( 30 Oct 2018 08:00 )  Alb: 2.2 g/dL / Pro: x     / ALK PHOS: x     / ALT: x     / AST: x     / GGT: x             PT/INR - ( 29 Oct 2018 05:56 )   PT: 28.0 sec;   INR: 2.54 ratio         PTT - ( 28 Oct 2018 16:24 )  PTT:39.4 sec      PT/INR - ( 29 Oct 2018 05:56 )   PT: 28.0 sec;   INR: 2.54 ratio         PTT - ( 28 Oct 2018 16:24 )  PTT:39.4 sec  CAPILLARY BLOOD GLUCOSE          MEDICATIONS  (STANDING):  allopurinol 100 milliGRAM(s) Oral daily  aspirin  chewable 81 milliGRAM(s) Oral daily  buDESOnide   0.5 milliGRAM(s) Respule 0.5 milliGRAM(s) Inhalation two times a day  cholecalciferol 1000 Unit(s) Oral daily  cholestyramine Powder (Sugar-Free) 4 Gram(s) Oral daily  diltiazem    milliGRAM(s) Oral daily  doxazosin 8 milliGRAM(s) Oral at bedtime  epoetin nelly Injectable 55740 Unit(s) IV Push <User Schedule>  epoetin nelly Injectable 3000 Unit(s) IV Push <User Schedule>  finasteride 5 milliGRAM(s) Oral daily  fluticasone propionate 50 MICROgram(s)/spray Nasal Spray 1 Spray(s) Both Nostrils <User Schedule>  loratadine 10 milliGRAM(s) Oral daily  pantoprazole    Tablet 40 milliGRAM(s) Oral before breakfast  ranolazine 500 milliGRAM(s) Oral two times a day  silver sulfADIAZINE 1% Cream 1 Application(s) Topical <User Schedule>  warfarin 3 milliGRAM(s) Oral at bedtime    MEDICATIONS  (PRN):  acetaminophen   Tablet .. 650 milliGRAM(s) Oral every 6 hours PRN Temp greater or equal to 38C (100.4F), Mild Pain (1 - 3)

## 2018-11-06 DIAGNOSIS — K21.9 GASTRO-ESOPHAGEAL REFLUX DISEASE WITHOUT ESOPHAGITIS: ICD-10-CM

## 2018-11-06 DIAGNOSIS — Z79.01 LONG TERM (CURRENT) USE OF ANTICOAGULANTS: ICD-10-CM

## 2018-11-06 DIAGNOSIS — Z99.81 DEPENDENCE ON SUPPLEMENTAL OXYGEN: ICD-10-CM

## 2018-11-06 DIAGNOSIS — Z95.5 PRESENCE OF CORONARY ANGIOPLASTY IMPLANT AND GRAFT: ICD-10-CM

## 2018-11-06 DIAGNOSIS — N18.6 END STAGE RENAL DISEASE: ICD-10-CM

## 2018-11-06 DIAGNOSIS — Z89.611 ACQUIRED ABSENCE OF RIGHT LEG ABOVE KNEE: ICD-10-CM

## 2018-11-06 DIAGNOSIS — I13.2 HYPERTENSIVE HEART AND CHRONIC KIDNEY DISEASE WITH HEART FAILURE AND WITH STAGE 5 CHRONIC KIDNEY DISEASE, OR END STAGE RENAL DISEASE: ICD-10-CM

## 2018-11-06 DIAGNOSIS — M10.9 GOUT, UNSPECIFIED: ICD-10-CM

## 2018-11-06 DIAGNOSIS — Z99.2 DEPENDENCE ON RENAL DIALYSIS: ICD-10-CM

## 2018-11-06 DIAGNOSIS — E78.5 HYPERLIPIDEMIA, UNSPECIFIED: ICD-10-CM

## 2018-11-06 DIAGNOSIS — I73.9 PERIPHERAL VASCULAR DISEASE, UNSPECIFIED: ICD-10-CM

## 2018-11-06 DIAGNOSIS — J44.9 CHRONIC OBSTRUCTIVE PULMONARY DISEASE, UNSPECIFIED: ICD-10-CM

## 2018-11-06 DIAGNOSIS — I21.3 ST ELEVATION (STEMI) MYOCARDIAL INFARCTION OF UNSPECIFIED SITE: ICD-10-CM

## 2018-11-06 DIAGNOSIS — N40.0 BENIGN PROSTATIC HYPERPLASIA WITHOUT LOWER URINARY TRACT SYMPTOMS: ICD-10-CM

## 2018-11-06 DIAGNOSIS — Z88.0 ALLERGY STATUS TO PENICILLIN: ICD-10-CM

## 2018-11-06 DIAGNOSIS — I50.32 CHRONIC DIASTOLIC (CONGESTIVE) HEART FAILURE: ICD-10-CM

## 2018-11-06 DIAGNOSIS — I25.10 ATHEROSCLEROTIC HEART DISEASE OF NATIVE CORONARY ARTERY WITHOUT ANGINA PECTORIS: ICD-10-CM

## 2018-11-06 DIAGNOSIS — Z86.19 PERSONAL HISTORY OF OTHER INFECTIOUS AND PARASITIC DISEASES: ICD-10-CM

## 2018-11-06 DIAGNOSIS — Z79.82 LONG TERM (CURRENT) USE OF ASPIRIN: ICD-10-CM

## 2018-11-06 DIAGNOSIS — G47.33 OBSTRUCTIVE SLEEP APNEA (ADULT) (PEDIATRIC): ICD-10-CM

## 2018-11-06 DIAGNOSIS — I48.91 UNSPECIFIED ATRIAL FIBRILLATION: ICD-10-CM

## 2018-11-06 DIAGNOSIS — Z87.19 PERSONAL HISTORY OF OTHER DISEASES OF THE DIGESTIVE SYSTEM: ICD-10-CM

## 2019-01-07 NOTE — BRIEF OPERATIVE NOTE - TYPE OF ANESTHESIA
***************************************************************  Enoch Freedman PGY1  Internal Medicine   Pager: [phone]  [facility] in house pager: [phone]  ***************************************************************    BEVERLEY DEL RIO  77y  MRN: [id number]    Patient is a 77y old  Female who presents with a chief complaint of Diarrhea (06 Jan 2019 13:20)      Subjective: Received ativan for anxiety (0.5mg), which pt takes as home med. Pt had several more episodes of diarrhea (now soft) overnight. Has been eating meals. Denies fever, CP, SOB, abn pain, N/V, dysuria.     MEDICATIONS  (STANDING):  atorvastatin 40 milliGRAM(s) Oral at bedtime  clopidogrel Tablet 75 milliGRAM(s) Oral daily  dabigatran 75 milliGRAM(s) Oral every 12 hours  lactated ringers. 750 milliLiter(s) (75 mL/Hr) IV Continuous <Continuous>  LORazepam     Tablet 0.5 milliGRAM(s) Oral two times a day  metoprolol tartrate 25 milliGRAM(s) Oral two times a day  saccharomyces boulardii 250 milliGRAM(s) Oral two times a day  vancomycin    Solution 125 milliGRAM(s) Oral every 6 hours    MEDICATIONS  (PRN):  acetaminophen   Tablet .. 650 milliGRAM(s) Oral every 6 hours PRN Temp greater or equal to 38C (100.4F), Mild Pain (1 - 3)  traMADol 25 milliGRAM(s) Oral two times a day PRN Moderate Pain (4 - 6)      Objective:    Vitals: Vital Signs Last 24 Hrs  T(C): 36.2 (01-07-19 @ 05:00), Max: 36.6 (01-06-19 @ 14:33)  T(F): 97.2 (01-07-19 @ 05:00), Max: 97.9 (01-06-19 @ 14:33)  HR: 114 (01-07-19 @ 05:00) (76 - 132)  BP: 97/67 (01-07-19 @ 05:00) (92/65 - 103/65)  BP(mean): --  RR: 20 (01-07-19 @ 05:00) (18 - 20)  SpO2: 96% (01-07-19 @ 05:00) (94% - 97%)              I&O's Summary    06 Jan 2019 07:01  -  07 Jan 2019 07:00  --------------------------------------------------------  IN: 360 mL / OUT: 0 mL / NET: 360 mL        PHYSICAL EXAM:  GENERAL: NAD, chronically ill appearing  HEAD:  Atraumatic, Normocephalic  EYES: EOMI, PERRLA  ENMT: No tonsillar erythema, exudates, or enlargement; dry mucous membranes, Good dentition  NECK: Supple, No JVD  NERVOUS SYSTEM: AOX3, motor and sensation grossly intact in b/l UE and b/l LE  PSYCHIATRIC: Appropriate affect and mood  CHEST/LUNG: Clear to auscultation bilaterally; No rales, rhonchi, wheezing, or rubs  HEART: irregular rate and rhythm; No murmurs, rubs, or gallops. 1+ b/l LE pitting edema to knees R>L  ABDOMEN: Soft, Nontender, Nondistended; Bowel sounds present  EXTREMITIES:  2+ Peripheral Pulses, No clubbing, cyanosis  SKIN: No rashes or lesions    LABS:                        10.8   6.9   )-----------( 194      ( 07 Jan 2019 07:10 )             31.9                         10.8   6.4   )-----------( 214      ( 06 Jan 2019 07:05 )             32.7                         10.9   6.2   )-----------( 228      ( 05 Jan 2019 06:05 )             33.1     01-07    130<L>  |  95<L>  |  52<H>  ----------------------------<  85  4.3   |  23  |  2.15<H>  01-06    129<L>  |  94<L>  |  45<H>  ----------------------------<  85  4.4   |  23  |  1.77<H>  01-05    128<L>  |  94<L>  |  44<H>  ----------------------------<  109<H>  4.3   |  25  |  1.77<H>    Ca    8.4      07 Jan 2019 07:10  Ca    8.4      06 Jan 2019 07:02  Ca    8.6      05 Jan 2019 19:31  Phos  4.0     01-07  Mg     2.0     01-07    TPro  6.1  /  Alb  2.6<L>  /  TBili  0.5  /  DBili  x   /  AST  9<L>  /  ALT  9<L>  /  AlkPhos  92  01-06  TPro  6.1  /  Alb  2.6<L>  /  TBili  0.6  /  DBili  x   /  AST  12  /  ALT  8<L>  /  AlkPhos  90  01-05  TPro  6.6  /  Alb  2.9<L>  /  TBili  0.5  /  DBili  x   /  AST  12  /  ALT  10  /  AlkPhos  105  01-04    PT/INR - ( 06 Jan 2019 07:05 )   PT: 27.3 sec;   INR: 2.33 ratio         PTT - ( 06 Jan 2019 07:05 )  PTT:86.7 sec                  CAPILLARY BLOOD GLUCOSE        RADIOLOGY & ADDITIONAL TESTS:  Imaging Personally Reviewed:  [x ] YES  [ ] NO  Consultants involved in case:   Consultant(s) Notes Reviewed:  [ x] YES  [ ] NO:   Care Discussed with Consultants/Other Providers [x ] YES  [ ] NO
Local without sedation
Local with sedation
***************************************************************  Enoch Freedman PGY1  Internal Medicine   Pager: [phone]  [facility] in house pager: [phone]  ***************************************************************    BEVERLEY DEL RIO  77y  MRN: [id number]    Patient is a 77y old  Female who presents with a chief complaint of Diarrhea (06 Jan 2019 13:20)      Subjective: Received ativan for anxiety (0.5mg), which pt takes as home med. Pt had several more 4-5 episodes of diarrhea (now soft) overnight. Has been eating meals. Denies fever, CP, SOB, abn pain, N/V, dysuria.     MEDICATIONS  (STANDING):  atorvastatin 40 milliGRAM(s) Oral at bedtime  clopidogrel Tablet 75 milliGRAM(s) Oral daily  dabigatran 75 milliGRAM(s) Oral every 12 hours  lactated ringers. 750 milliLiter(s) (75 mL/Hr) IV Continuous <Continuous>  LORazepam     Tablet 0.5 milliGRAM(s) Oral two times a day  metoprolol tartrate 25 milliGRAM(s) Oral two times a day  saccharomyces boulardii 250 milliGRAM(s) Oral two times a day  vancomycin    Solution 125 milliGRAM(s) Oral every 6 hours    MEDICATIONS  (PRN):  acetaminophen   Tablet .. 650 milliGRAM(s) Oral every 6 hours PRN Temp greater or equal to 38C (100.4F), Mild Pain (1 - 3)  traMADol 25 milliGRAM(s) Oral two times a day PRN Moderate Pain (4 - 6)      Objective:    Vitals: Vital Signs Last 24 Hrs  T(C): 36.2 (01-07-19 @ 05:00), Max: 36.6 (01-06-19 @ 14:33)  T(F): 97.2 (01-07-19 @ 05:00), Max: 97.9 (01-06-19 @ 14:33)  HR: 114 (01-07-19 @ 05:00) (76 - 132)  BP: 97/67 (01-07-19 @ 05:00) (92/65 - 103/65)  BP(mean): --  RR: 20 (01-07-19 @ 05:00) (18 - 20)  SpO2: 96% (01-07-19 @ 05:00) (94% - 97%)              I&O's Summary    06 Jan 2019 07:01  -  07 Jan 2019 07:00  --------------------------------------------------------  IN: 360 mL / OUT: 0 mL / NET: 360 mL        PHYSICAL EXAM:  GENERAL: NAD, chronically ill appearing  HEAD:  Atraumatic, Normocephalic  NECK: Supple, No JVD  NERVOUS SYSTEM: AOX3,   PSYCHIATRIC: Appropriate affect and mood  CHEST/LUNG: Clear to auscultation bilaterally;   HEART: irregular rate and rhythm; .  ABDOMEN: Soft, Nontender, Nondistended; Bowel sounds present  EXTREMITIES:  2+ Peripheral Pulses, No clubbing, cyanosis 2+ b/l LE pitting edema to knees       LABS:                        10.8   6.9   )-----------( 194      ( 07 Jan 2019 07:10 )             31.9                         10.8   6.4   )-----------( 214      ( 06 Jan 2019 07:05 )             32.7                         10.9   6.2   )-----------( 228      ( 05 Jan 2019 06:05 )             33.1     01-07    130<L>  |  95<L>  |  52<H>  ----------------------------<  85  4.3   |  23  |  2.15<H>  01-06    129<L>  |  94<L>  |  45<H>  ----------------------------<  85  4.4   |  23  |  1.77<H>  01-05    128<L>  |  94<L>  |  44<H>  ----------------------------<  109<H>  4.3   |  25  |  1.77<H>    Ca    8.4      07 Jan 2019 07:10  Ca    8.4      06 Jan 2019 07:02  Ca    8.6      05 Jan 2019 19:31  Phos  4.0     01-07  Mg     2.0     01-07    TPro  6.1  /  Alb  2.6<L>  /  TBili  0.5  /  DBili  x   /  AST  9<L>  /  ALT  9<L>  /  AlkPhos  92  01-06  TPro  6.1  /  Alb  2.6<L>  /  TBili  0.6  /  DBili  x   /  AST  12  /  ALT  8<L>  /  AlkPhos  90  01-05  TPro  6.6  /  Alb  2.9<L>  /  TBili  0.5  /  DBili  x   /  AST  12  /  ALT  10  /  AlkPhos  105  01-04    PT/INR - ( 06 Jan 2019 07:05 )   PT: 27.3 sec;   INR: 2.33 ratio         PTT - ( 06 Jan 2019 07:05 )  PTT:86.7 sec                  CAPILLARY BLOOD GLUCOSE        RADIOLOGY & ADDITIONAL TESTS:  Imaging Personally Reviewed:  [x ] YES  [ ] NO  Consultants involved in case:   Consultant(s) Notes Reviewed:  [ x] YES  [ ] NO:   Care Discussed with Consultants/Other Providers [x ] YES  [ ] NO

## 2019-01-16 NOTE — PROGRESS NOTE ADULT - ASSESSMENT
Patient states she got new insurance and needs the following script sent to [facility]. Patient states it will need a PA.    Please call patient with any questions.   82 year old male with history of CAD s/p stents (LAD, RCA bare metal around 9/16),PVD (RLE stent/ angioplasty and surgical debridment by Dr Siu 5/20,right lower extremity amputation ) A.Fib  on coumadin, Hypertension, COPD on home O2 2L, SHAYY onhistory of  nocturnal BIPAP, ex-smoker (smoked 1ppd X 50 years, quit 22 years ago),  Chronic diastolic CHF, Hyperlipidemia, PVD, Iron deficiency anemia, Chronic back pain, Gout, BPH, CKD III . 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    COPD with no significant bronchospasm  chronic hypoxemic resp failure on home o2 therapy  adequate oxygenation  CHF cont factor  SHAYY / OHS on home BIPAP  resp status improvedfurther significant wt loss reported  newly dxed LUE Thrombus    being evaluated for SVC filter placement  repeat cxr  if elevated bicarb procedures needed will get ABG, but currently does not change our management  incentive spirometry  bronchodilator rx  all rev memo family member at bedside today  I SHALL FU PRN

## 2019-01-31 ENCOUNTER — OUTPATIENT (OUTPATIENT)
Dept: OUTPATIENT SERVICES | Facility: HOSPITAL | Age: 84
LOS: 1 days | Discharge: HOME | End: 2019-01-31
Payer: MEDICARE

## 2019-01-31 DIAGNOSIS — L97.801 NON-PRESSURE CHRONIC ULCER OF OTHER PART OF UNSPECIFIED LOWER LEG LIMITED TO BREAKDOWN OF SKIN: ICD-10-CM

## 2019-01-31 DIAGNOSIS — Z95.9 PRESENCE OF CARDIAC AND VASCULAR IMPLANT AND GRAFT, UNSPECIFIED: Chronic | ICD-10-CM

## 2019-01-31 PROCEDURE — G0463: CPT

## 2019-02-02 DIAGNOSIS — J44.9 CHRONIC OBSTRUCTIVE PULMONARY DISEASE, UNSPECIFIED: ICD-10-CM

## 2019-02-02 DIAGNOSIS — M10.9 GOUT, UNSPECIFIED: ICD-10-CM

## 2019-02-02 DIAGNOSIS — I12.9 HYPERTENSIVE CHRONIC KIDNEY DISEASE WITH STAGE 1 THROUGH STAGE 4 CHRONIC KIDNEY DISEASE, OR UNSPECIFIED CHRONIC KIDNEY DISEASE: ICD-10-CM

## 2019-02-02 DIAGNOSIS — K21.9 GASTRO-ESOPHAGEAL REFLUX DISEASE WITHOUT ESOPHAGITIS: ICD-10-CM

## 2019-02-02 DIAGNOSIS — M48.00 SPINAL STENOSIS, SITE UNSPECIFIED: ICD-10-CM

## 2019-02-02 DIAGNOSIS — I50.30 UNSPECIFIED DIASTOLIC (CONGESTIVE) HEART FAILURE: ICD-10-CM

## 2019-02-02 DIAGNOSIS — Z88.0 ALLERGY STATUS TO PENICILLIN: ICD-10-CM

## 2019-02-02 DIAGNOSIS — N18.9 CHRONIC KIDNEY DISEASE, UNSPECIFIED: ICD-10-CM

## 2019-02-02 DIAGNOSIS — L97.822 NON-PRESSURE CHRONIC ULCER OF OTHER PART OF LEFT LOWER LEG WITH FAT LAYER EXPOSED: ICD-10-CM

## 2019-02-02 DIAGNOSIS — Z87.891 PERSONAL HISTORY OF NICOTINE DEPENDENCE: ICD-10-CM

## 2019-02-02 DIAGNOSIS — I70.202 UNSPECIFIED ATHEROSCLEROSIS OF NATIVE ARTERIES OF EXTREMITIES, LEFT LEG: ICD-10-CM

## 2019-02-02 DIAGNOSIS — Z89.611 ACQUIRED ABSENCE OF RIGHT LEG ABOVE KNEE: ICD-10-CM

## 2019-02-02 DIAGNOSIS — I83.028 VARICOSE VEINS OF LEFT LOWER EXTREMITY WITH ULCER OTHER PART OF LOWER LEG: ICD-10-CM

## 2019-02-02 DIAGNOSIS — N40.0 BENIGN PROSTATIC HYPERPLASIA WITHOUT LOWER URINARY TRACT SYMPTOMS: ICD-10-CM

## 2019-02-02 DIAGNOSIS — D64.9 ANEMIA, UNSPECIFIED: ICD-10-CM

## 2019-02-02 DIAGNOSIS — E78.00 PURE HYPERCHOLESTEROLEMIA, UNSPECIFIED: ICD-10-CM

## 2019-02-02 DIAGNOSIS — I25.10 ATHEROSCLEROTIC HEART DISEASE OF NATIVE CORONARY ARTERY WITHOUT ANGINA PECTORIS: ICD-10-CM

## 2019-02-02 DIAGNOSIS — Z98.61 CORONARY ANGIOPLASTY STATUS: ICD-10-CM

## 2019-02-02 DIAGNOSIS — Z79.82 LONG TERM (CURRENT) USE OF ASPIRIN: ICD-10-CM

## 2019-02-02 DIAGNOSIS — Z83.3 FAMILY HISTORY OF DIABETES MELLITUS: ICD-10-CM

## 2019-02-02 DIAGNOSIS — Z79.899 OTHER LONG TERM (CURRENT) DRUG THERAPY: ICD-10-CM

## 2019-02-11 ENCOUNTER — OUTPATIENT (OUTPATIENT)
Dept: OUTPATIENT SERVICES | Facility: HOSPITAL | Age: 84
LOS: 1 days | Discharge: HOME | End: 2019-02-11

## 2019-02-11 VITALS
SYSTOLIC BLOOD PRESSURE: 135 MMHG | RESPIRATION RATE: 16 BRPM | TEMPERATURE: 99 F | DIASTOLIC BLOOD PRESSURE: 55 MMHG | HEART RATE: 79 BPM

## 2019-02-11 VITALS
SYSTOLIC BLOOD PRESSURE: 129 MMHG | RESPIRATION RATE: 16 BRPM | DIASTOLIC BLOOD PRESSURE: 50 MMHG | HEART RATE: 80 BPM | TEMPERATURE: 98 F

## 2019-02-11 DIAGNOSIS — D63.1 ANEMIA IN CHRONIC KIDNEY DISEASE: ICD-10-CM

## 2019-02-11 DIAGNOSIS — N18.6 END STAGE RENAL DISEASE: ICD-10-CM

## 2019-02-11 DIAGNOSIS — Z95.9 PRESENCE OF CARDIAC AND VASCULAR IMPLANT AND GRAFT, UNSPECIFIED: Chronic | ICD-10-CM

## 2019-02-11 LAB
ALBUMIN SERPL ELPH-MCNC: 2.8 G/DL — LOW (ref 3.3–5)
ANION GAP SERPL CALC-SCNC: 9 MMOL/L — SIGNIFICANT CHANGE UP (ref 5–17)
BLD GP AB SCN SERPL QL: SIGNIFICANT CHANGE UP
BUN SERPL-MCNC: 70 MG/DL — HIGH (ref 7–23)
CALCIUM SERPL-MCNC: 7.8 MG/DL — LOW (ref 8.5–10.1)
CHLORIDE SERPL-SCNC: 106 MMOL/L — SIGNIFICANT CHANGE UP (ref 96–108)
CO2 SERPL-SCNC: 24 MMOL/L — SIGNIFICANT CHANGE UP (ref 22–31)
CREAT SERPL-MCNC: 5.51 MG/DL — HIGH (ref 0.5–1.3)
GLUCOSE SERPL-MCNC: 101 MG/DL — HIGH (ref 70–99)
HCT VFR BLD CALC: 24.1 % — LOW (ref 39–50)
HGB BLD-MCNC: 7.2 G/DL — LOW (ref 13–17)
MCHC RBC-ENTMCNC: 29.9 GM/DL — LOW (ref 32–36)
MCHC RBC-ENTMCNC: 31 PG — SIGNIFICANT CHANGE UP (ref 27–34)
MCV RBC AUTO: 103.9 FL — HIGH (ref 80–100)
NRBC # BLD: 0 /100 WBCS — SIGNIFICANT CHANGE UP (ref 0–0)
PHOSPHATE SERPL-MCNC: 4.2 MG/DL — SIGNIFICANT CHANGE UP (ref 2.5–4.5)
PLATELET # BLD AUTO: 163 K/UL — SIGNIFICANT CHANGE UP (ref 150–400)
POTASSIUM SERPL-MCNC: 5.9 MMOL/L — HIGH (ref 3.5–5.3)
POTASSIUM SERPL-SCNC: 5.9 MMOL/L — HIGH (ref 3.5–5.3)
RBC # BLD: 2.32 M/UL — LOW (ref 4.2–5.8)
RBC # FLD: 21.3 % — HIGH (ref 10.3–14.5)
SODIUM SERPL-SCNC: 139 MMOL/L — SIGNIFICANT CHANGE UP (ref 135–145)
TYPE + AB SCN PNL BLD: SIGNIFICANT CHANGE UP
WBC # BLD: 2.5 K/UL — LOW (ref 3.8–10.5)
WBC # FLD AUTO: 2.5 K/UL — LOW (ref 3.8–10.5)

## 2019-02-12 LAB
HAV IGM SER-ACNC: SIGNIFICANT CHANGE UP
HBV CORE IGM SER-ACNC: SIGNIFICANT CHANGE UP
HBV SURFACE AG SER-ACNC: REACTIVE
HCV AB S/CO SERPL IA: 0.08 S/CO — SIGNIFICANT CHANGE UP
HCV AB SERPL-IMP: SIGNIFICANT CHANGE UP

## 2019-02-12 NOTE — PROVIDER CONTACT NOTE (OTHER) - ACTION/TREATMENT ORDERED:
[facility]  [facility] to [facility]. Mach #[id number] will be isolated until notified of additional Hepatitis B testing is completed ,

## 2019-03-04 RX ORDER — VANCOMYCIN HCL 1 G
1 VIAL (EA) INTRAVENOUS
Qty: 0 | Refills: 0 | COMMUNITY
Start: 2019-03-04 | End: 2019-03-15

## 2019-03-07 ENCOUNTER — OUTPATIENT (OUTPATIENT)
Dept: OUTPATIENT SERVICES | Facility: HOSPITAL | Age: 84
LOS: 1 days | Discharge: HOME | End: 2019-03-07
Payer: MEDICARE

## 2019-03-07 DIAGNOSIS — L89.893 PRESSURE ULCER OF OTHER SITE, STAGE 3: ICD-10-CM

## 2019-03-07 DIAGNOSIS — Z95.9 PRESENCE OF CARDIAC AND VASCULAR IMPLANT AND GRAFT, UNSPECIFIED: Chronic | ICD-10-CM

## 2019-03-07 PROCEDURE — 97602 WOUND(S) CARE NON-SELECTIVE: CPT

## 2019-03-08 NOTE — PHYSICAL THERAPY INITIAL EVALUATION ADULT - PHYSICAL ASSIST/NONPHYSICAL ASSIST: STAND/SIT, REHAB EVAL
1 person assist
Arthralgia of right knee    Deviated septum    Fatty liver    Flank pain  Left  Hemorrhoids, unspecified hemorrhoid type    Hypertension, unspecified type    Kidney stone on left side    Nasal congestion    Obesity    Obstructive sleep apnea syndrome  CPAP Machine  Psoriasis

## 2019-03-10 DIAGNOSIS — Z79.899 OTHER LONG TERM (CURRENT) DRUG THERAPY: ICD-10-CM

## 2019-03-10 DIAGNOSIS — I25.10 ATHEROSCLEROTIC HEART DISEASE OF NATIVE CORONARY ARTERY WITHOUT ANGINA PECTORIS: ICD-10-CM

## 2019-03-10 DIAGNOSIS — E78.00 PURE HYPERCHOLESTEROLEMIA, UNSPECIFIED: ICD-10-CM

## 2019-03-10 DIAGNOSIS — J44.9 CHRONIC OBSTRUCTIVE PULMONARY DISEASE, UNSPECIFIED: ICD-10-CM

## 2019-03-10 DIAGNOSIS — Z87.891 PERSONAL HISTORY OF NICOTINE DEPENDENCE: ICD-10-CM

## 2019-03-10 DIAGNOSIS — I83.028 VARICOSE VEINS OF LEFT LOWER EXTREMITY WITH ULCER OTHER PART OF LOWER LEG: ICD-10-CM

## 2019-03-10 DIAGNOSIS — N40.0 BENIGN PROSTATIC HYPERPLASIA WITHOUT LOWER URINARY TRACT SYMPTOMS: ICD-10-CM

## 2019-03-10 DIAGNOSIS — K21.9 GASTRO-ESOPHAGEAL REFLUX DISEASE WITHOUT ESOPHAGITIS: ICD-10-CM

## 2019-03-10 DIAGNOSIS — Z79.82 LONG TERM (CURRENT) USE OF ASPIRIN: ICD-10-CM

## 2019-03-10 DIAGNOSIS — Z88.0 ALLERGY STATUS TO PENICILLIN: ICD-10-CM

## 2019-03-10 DIAGNOSIS — L97.825 NON-PRESSURE CHRONIC ULCER OF OTHER PART OF LEFT LOWER LEG WITH MUSCLE INVOLVEMENT WITHOUT EVIDENCE OF NECROSIS: ICD-10-CM

## 2019-03-10 DIAGNOSIS — Z89.611 ACQUIRED ABSENCE OF RIGHT LEG ABOVE KNEE: ICD-10-CM

## 2019-03-10 DIAGNOSIS — I12.9 HYPERTENSIVE CHRONIC KIDNEY DISEASE WITH STAGE 1 THROUGH STAGE 4 CHRONIC KIDNEY DISEASE, OR UNSPECIFIED CHRONIC KIDNEY DISEASE: ICD-10-CM

## 2019-03-10 DIAGNOSIS — Z99.2 DEPENDENCE ON RENAL DIALYSIS: ICD-10-CM

## 2019-03-10 DIAGNOSIS — Z98.61 CORONARY ANGIOPLASTY STATUS: ICD-10-CM

## 2019-03-10 DIAGNOSIS — Z83.3 FAMILY HISTORY OF DIABETES MELLITUS: ICD-10-CM

## 2019-03-10 DIAGNOSIS — D64.9 ANEMIA, UNSPECIFIED: ICD-10-CM

## 2019-03-10 DIAGNOSIS — I70.202 UNSPECIFIED ATHEROSCLEROSIS OF NATIVE ARTERIES OF EXTREMITIES, LEFT LEG: ICD-10-CM

## 2019-03-10 DIAGNOSIS — M48.00 SPINAL STENOSIS, SITE UNSPECIFIED: ICD-10-CM

## 2019-03-10 DIAGNOSIS — I50.30 UNSPECIFIED DIASTOLIC (CONGESTIVE) HEART FAILURE: ICD-10-CM

## 2019-03-10 DIAGNOSIS — M10.9 GOUT, UNSPECIFIED: ICD-10-CM

## 2019-03-10 DIAGNOSIS — N18.9 CHRONIC KIDNEY DISEASE, UNSPECIFIED: ICD-10-CM

## 2019-03-27 NOTE — ASU PATIENT PROFILE, ADULT - IS PATIENT PREGNANT?
[facility]   Chair/Bed-to-Chair not applicable (Male)

## 2019-03-27 NOTE — ASU PATIENT PROFILE, ADULT - LOCATION
left buttock,  duoderm in place,  daughter states "stage  2" right buttock red, purple area left lower leg  venous stasis ulcer,  dsg D&I

## 2019-03-28 ENCOUNTER — INPATIENT (INPATIENT)
Facility: HOSPITAL | Age: 84
LOS: 13 days | Discharge: HOME HEALTH | End: 2019-04-11
Attending: THORACIC SURGERY (CARDIOTHORACIC VASCULAR SURGERY) | Admitting: THORACIC SURGERY (CARDIOTHORACIC VASCULAR SURGERY)
Payer: MEDICARE

## 2019-03-28 VITALS
SYSTOLIC BLOOD PRESSURE: 136 MMHG | DIASTOLIC BLOOD PRESSURE: 47 MMHG | OXYGEN SATURATION: 97 % | HEART RATE: 83 BPM | TEMPERATURE: 98 F | RESPIRATION RATE: 16 BRPM | HEIGHT: 67 IN | WEIGHT: 169.98 LBS

## 2019-03-28 DIAGNOSIS — Z95.9 PRESENCE OF CARDIAC AND VASCULAR IMPLANT AND GRAFT, UNSPECIFIED: Chronic | ICD-10-CM

## 2019-03-28 LAB
ALLERGY+IMMUNOLOGY DIAG STUDY NOTE: SIGNIFICANT CHANGE UP
ANION GAP SERPL CALC-SCNC: 6 MMOL/L — SIGNIFICANT CHANGE UP (ref 5–17)
APTT BLD: 31.4 SEC — SIGNIFICANT CHANGE UP (ref 27.5–36.3)
BUN SERPL-MCNC: 28 MG/DL — HIGH (ref 7–23)
CALCIUM SERPL-MCNC: 8.5 MG/DL — SIGNIFICANT CHANGE UP (ref 8.5–10.1)
CHLORIDE SERPL-SCNC: 107 MMOL/L — SIGNIFICANT CHANGE UP (ref 96–108)
CLOSURE TME COLL+EPINEP BLD: 86 K/UL — LOW (ref 150–400)
CO2 SERPL-SCNC: 28 MMOL/L — SIGNIFICANT CHANGE UP (ref 22–31)
CREAT SERPL-MCNC: 3.63 MG/DL — HIGH (ref 0.5–1.3)
GLUCOSE SERPL-MCNC: 89 MG/DL — SIGNIFICANT CHANGE UP (ref 70–99)
HCT VFR BLD CALC: 31.7 % — LOW (ref 39–50)
HGB BLD-MCNC: 10.2 G/DL — LOW (ref 13–17)
INR BLD: 1.09 RATIO — SIGNIFICANT CHANGE UP (ref 0.88–1.16)
MCHC RBC-ENTMCNC: 32.2 GM/DL — SIGNIFICANT CHANGE UP (ref 32–36)
MCHC RBC-ENTMCNC: 33.4 PG — SIGNIFICANT CHANGE UP (ref 27–34)
MCV RBC AUTO: 103.9 FL — HIGH (ref 80–100)
NRBC # BLD: 0 /100 WBCS — SIGNIFICANT CHANGE UP (ref 0–0)
PLATELET # BLD AUTO: SIGNIFICANT CHANGE UP (ref 150–400)
POTASSIUM SERPL-MCNC: 4 MMOL/L — SIGNIFICANT CHANGE UP (ref 3.5–5.3)
POTASSIUM SERPL-SCNC: 4 MMOL/L — SIGNIFICANT CHANGE UP (ref 3.5–5.3)
PROTHROM AB SERPL-ACNC: 12.1 SEC — SIGNIFICANT CHANGE UP (ref 10–12.9)
RBC # BLD: 3.05 M/UL — LOW (ref 4.2–5.8)
RBC # FLD: 19.1 % — HIGH (ref 10.3–14.5)
SODIUM SERPL-SCNC: 141 MMOL/L — SIGNIFICANT CHANGE UP (ref 135–145)
WBC # BLD: 2.89 K/UL — LOW (ref 3.8–10.5)
WBC # FLD AUTO: 2.89 K/UL — LOW (ref 3.8–10.5)

## 2019-03-28 PROCEDURE — 93010 ELECTROCARDIOGRAM REPORT: CPT

## 2019-03-28 RX ORDER — ALLOPURINOL 300 MG
100 TABLET ORAL DAILY
Qty: 0 | Refills: 0 | Status: DISCONTINUED | OUTPATIENT
Start: 2019-03-28 | End: 2019-04-01

## 2019-03-28 RX ORDER — ACETAMINOPHEN 500 MG
650 TABLET ORAL EVERY 6 HOURS
Qty: 0 | Refills: 0 | Status: DISCONTINUED | OUTPATIENT
Start: 2019-03-28 | End: 2019-04-01

## 2019-03-28 RX ORDER — SEVELAMER CARBONATE 2400 MG/1
800 POWDER, FOR SUSPENSION ORAL
Qty: 0 | Refills: 0 | Status: DISCONTINUED | OUTPATIENT
Start: 2019-03-28 | End: 2019-04-01

## 2019-03-28 RX ORDER — LIDOCAINE AND PRILOCAINE CREAM 25; 25 MG/G; MG/G
1 CREAM TOPICAL
Qty: 0 | Refills: 0 | Status: DISCONTINUED | OUTPATIENT
Start: 2019-03-28 | End: 2019-04-01

## 2019-03-28 RX ORDER — SODIUM CHLORIDE 9 MG/ML
1000 INJECTION INTRAMUSCULAR; INTRAVENOUS; SUBCUTANEOUS
Qty: 0 | Refills: 0 | Status: DISCONTINUED | OUTPATIENT
Start: 2019-03-28 | End: 2019-03-28

## 2019-03-28 RX ORDER — DILTIAZEM HCL 120 MG
0 CAPSULE, EXT RELEASE 24 HR ORAL
Qty: 0 | Refills: 0 | COMMUNITY

## 2019-03-28 RX ORDER — OXYCODONE HYDROCHLORIDE 5 MG/1
5 TABLET ORAL ONCE
Qty: 0 | Refills: 0 | Status: DISCONTINUED | OUTPATIENT
Start: 2019-03-28 | End: 2019-03-28

## 2019-03-28 RX ORDER — LORATADINE 10 MG/1
10 TABLET ORAL DAILY
Qty: 0 | Refills: 0 | Status: DISCONTINUED | OUTPATIENT
Start: 2019-03-28 | End: 2019-04-01

## 2019-03-28 RX ORDER — PANTOPRAZOLE SODIUM 20 MG/1
40 TABLET, DELAYED RELEASE ORAL
Qty: 0 | Refills: 0 | Status: DISCONTINUED | OUTPATIENT
Start: 2019-03-28 | End: 2019-04-01

## 2019-03-28 RX ORDER — FINASTERIDE 5 MG/1
5 TABLET, FILM COATED ORAL DAILY
Qty: 0 | Refills: 0 | Status: DISCONTINUED | OUTPATIENT
Start: 2019-03-28 | End: 2019-04-01

## 2019-03-28 RX ORDER — CHOLESTYRAMINE 4 G/9G
4 POWDER, FOR SUSPENSION ORAL DAILY
Qty: 0 | Refills: 0 | Status: DISCONTINUED | OUTPATIENT
Start: 2019-03-28 | End: 2019-04-01

## 2019-03-28 RX ORDER — CHOLECALCIFEROL (VITAMIN D3) 125 MCG
1000 CAPSULE ORAL DAILY
Qty: 0 | Refills: 0 | Status: DISCONTINUED | OUTPATIENT
Start: 2019-03-28 | End: 2019-04-01

## 2019-03-28 RX ORDER — FLUTICASONE PROPIONATE 50 MCG
1 SPRAY, SUSPENSION NASAL DAILY
Qty: 0 | Refills: 0 | Status: DISCONTINUED | OUTPATIENT
Start: 2019-03-28 | End: 2019-04-01

## 2019-03-28 RX ORDER — ASPIRIN/CALCIUM CARB/MAGNESIUM 324 MG
81 TABLET ORAL DAILY
Qty: 0 | Refills: 0 | Status: DISCONTINUED | OUTPATIENT
Start: 2019-03-28 | End: 2019-04-01

## 2019-03-28 RX ORDER — ATORVASTATIN CALCIUM 80 MG/1
20 TABLET, FILM COATED ORAL AT BEDTIME
Qty: 0 | Refills: 0 | Status: DISCONTINUED | OUTPATIENT
Start: 2019-03-28 | End: 2019-04-01

## 2019-03-28 RX ORDER — NITROGLYCERIN 6.5 MG
0.4 CAPSULE, EXTENDED RELEASE ORAL DAILY
Qty: 0 | Refills: 0 | Status: DISCONTINUED | OUTPATIENT
Start: 2019-03-28 | End: 2019-04-01

## 2019-03-28 RX ORDER — BUDESONIDE, MICRONIZED 100 %
0.5 POWDER (GRAM) MISCELLANEOUS
Qty: 0 | Refills: 0 | Status: DISCONTINUED | OUTPATIENT
Start: 2019-03-28 | End: 2019-04-01

## 2019-03-28 RX ORDER — DOXAZOSIN MESYLATE 4 MG
0 TABLET ORAL
Qty: 0 | Refills: 0 | COMMUNITY

## 2019-03-28 RX ORDER — METHOTREXATE 2.5 MG/1
10 TABLET ORAL
Qty: 0 | Refills: 0 | Status: DISCONTINUED | OUTPATIENT
Start: 2019-03-28 | End: 2019-04-01

## 2019-03-28 RX ORDER — FENTANYL CITRATE 50 UG/ML
50 INJECTION INTRAVENOUS
Qty: 0 | Refills: 0 | Status: DISCONTINUED | OUTPATIENT
Start: 2019-03-28 | End: 2019-03-28

## 2019-03-28 RX ORDER — ACETAMINOPHEN 500 MG
325 TABLET ORAL
Qty: 0 | Refills: 0 | COMMUNITY

## 2019-03-28 RX ORDER — ACETAMINOPHEN 500 MG
1000 TABLET ORAL ONCE
Qty: 0 | Refills: 0 | Status: DISCONTINUED | OUTPATIENT
Start: 2019-03-28 | End: 2019-03-28

## 2019-03-28 RX ORDER — METHOTREXATE 2.5 MG/1
2.5 TABLET ORAL
Qty: 0 | Refills: 0 | Status: DISCONTINUED | OUTPATIENT
Start: 2019-03-28 | End: 2019-03-28

## 2019-03-28 RX ADMIN — Medication 650 MILLIGRAM(S): at 23:07

## 2019-03-28 RX ADMIN — CHOLESTYRAMINE 4 GRAM(S): 4 POWDER, FOR SUSPENSION ORAL at 12:20

## 2019-03-28 RX ADMIN — Medication 81 MILLIGRAM(S): at 12:21

## 2019-03-28 RX ADMIN — LORATADINE 10 MILLIGRAM(S): 10 TABLET ORAL at 12:20

## 2019-03-28 RX ADMIN — Medication 1000 UNIT(S): at 12:21

## 2019-03-28 RX ADMIN — Medication 100 MILLIGRAM(S): at 12:21

## 2019-03-28 RX ADMIN — PANTOPRAZOLE SODIUM 40 MILLIGRAM(S): 20 TABLET, DELAYED RELEASE ORAL at 12:21

## 2019-03-28 RX ADMIN — Medication 1 APPLICATION(S): at 17:33

## 2019-03-28 RX ADMIN — SEVELAMER CARBONATE 800 MILLIGRAM(S): 2400 POWDER, FOR SUSPENSION ORAL at 17:34

## 2019-03-28 RX ADMIN — FINASTERIDE 5 MILLIGRAM(S): 5 TABLET, FILM COATED ORAL at 21:53

## 2019-03-28 RX ADMIN — Medication 0.5 MILLIGRAM(S): at 21:15

## 2019-03-28 RX ADMIN — Medication 5 MILLIGRAM(S): at 12:21

## 2019-03-29 LAB
ANION GAP SERPL CALC-SCNC: 11 MMOL/L — SIGNIFICANT CHANGE UP (ref 5–17)
ANISOCYTOSIS BLD QL: SIGNIFICANT CHANGE UP
BASOPHILS # BLD AUTO: 0 K/UL — SIGNIFICANT CHANGE UP (ref 0–0.2)
BASOPHILS NFR BLD AUTO: 0 % — SIGNIFICANT CHANGE UP (ref 0–2)
BUN SERPL-MCNC: 46 MG/DL — HIGH (ref 7–23)
CALCIUM SERPL-MCNC: 7.9 MG/DL — LOW (ref 8.5–10.1)
CHLORIDE SERPL-SCNC: 107 MMOL/L — SIGNIFICANT CHANGE UP (ref 96–108)
CO2 SERPL-SCNC: 23 MMOL/L — SIGNIFICANT CHANGE UP (ref 22–31)
CREAT SERPL-MCNC: 4.74 MG/DL — HIGH (ref 0.5–1.3)
ELLIPTOCYTES BLD QL SMEAR: SLIGHT — SIGNIFICANT CHANGE UP
EOSINOPHIL # BLD AUTO: 0.06 K/UL — SIGNIFICANT CHANGE UP (ref 0–0.5)
EOSINOPHIL NFR BLD AUTO: 2 % — SIGNIFICANT CHANGE UP (ref 0–6)
GLUCOSE SERPL-MCNC: 74 MG/DL — SIGNIFICANT CHANGE UP (ref 70–99)
HAV IGM SER-ACNC: SIGNIFICANT CHANGE UP
HBV CORE IGM SER-ACNC: SIGNIFICANT CHANGE UP
HBV SURFACE AG SER-ACNC: SIGNIFICANT CHANGE UP
HCT VFR BLD CALC: 28.9 % — LOW (ref 39–50)
HCV AB S/CO SERPL IA: 0.07 S/CO — SIGNIFICANT CHANGE UP (ref 0–0.79)
HCV AB SERPL-IMP: SIGNIFICANT CHANGE UP
HGB BLD-MCNC: 9.1 G/DL — LOW (ref 13–17)
HYPOCHROMIA BLD QL: SLIGHT — SIGNIFICANT CHANGE UP
LYMPHOCYTES # BLD AUTO: 0.51 K/UL — LOW (ref 1–3.3)
LYMPHOCYTES # BLD AUTO: 18 % — SIGNIFICANT CHANGE UP (ref 13–44)
MACROCYTES BLD QL: SLIGHT — SIGNIFICANT CHANGE UP
MANUAL SMEAR VERIFICATION: SIGNIFICANT CHANGE UP
MCHC RBC-ENTMCNC: 31.5 GM/DL — LOW (ref 32–36)
MCHC RBC-ENTMCNC: 33.6 PG — SIGNIFICANT CHANGE UP (ref 27–34)
MCV RBC AUTO: 106.6 FL — HIGH (ref 80–100)
MICROCYTES BLD QL: SLIGHT — SIGNIFICANT CHANGE UP
MONOCYTES # BLD AUTO: 0.17 K/UL — SIGNIFICANT CHANGE UP (ref 0–0.9)
MONOCYTES NFR BLD AUTO: 6 % — SIGNIFICANT CHANGE UP (ref 2–14)
NEUTROPHILS # BLD AUTO: 2.09 K/UL — SIGNIFICANT CHANGE UP (ref 1.8–7.4)
NEUTROPHILS NFR BLD AUTO: 72 % — SIGNIFICANT CHANGE UP (ref 43–77)
NEUTS BAND # BLD: 2 % — SIGNIFICANT CHANGE UP (ref 0–8)
NRBC # BLD: 0 /100 — SIGNIFICANT CHANGE UP (ref 0–0)
NRBC # BLD: SIGNIFICANT CHANGE UP /100 WBCS (ref 0–0)
PHOSPHATE SERPL-MCNC: 3.6 MG/DL — SIGNIFICANT CHANGE UP (ref 2.5–4.5)
PLAT MORPH BLD: NORMAL — SIGNIFICANT CHANGE UP
PLATELET # BLD AUTO: 104 K/UL — LOW (ref 150–400)
POIKILOCYTOSIS BLD QL AUTO: SLIGHT — SIGNIFICANT CHANGE UP
POLYCHROMASIA BLD QL SMEAR: SLIGHT — SIGNIFICANT CHANGE UP
POTASSIUM SERPL-MCNC: 4.4 MMOL/L — SIGNIFICANT CHANGE UP (ref 3.5–5.3)
POTASSIUM SERPL-SCNC: 4.4 MMOL/L — SIGNIFICANT CHANGE UP (ref 3.5–5.3)
RBC # BLD: 2.71 M/UL — LOW (ref 4.2–5.8)
RBC # FLD: 19.3 % — HIGH (ref 10.3–14.5)
RBC BLD AUTO: ABNORMAL
SCHISTOCYTES BLD QL AUTO: SLIGHT — SIGNIFICANT CHANGE UP
SODIUM SERPL-SCNC: 141 MMOL/L — SIGNIFICANT CHANGE UP (ref 135–145)
WBC # BLD: 2.83 K/UL — LOW (ref 3.8–10.5)
WBC # FLD AUTO: 2.83 K/UL — LOW (ref 3.8–10.5)

## 2019-03-29 RX ORDER — ERYTHROPOIETIN 10000 [IU]/ML
6000 INJECTION, SOLUTION INTRAVENOUS; SUBCUTANEOUS
Qty: 0 | Refills: 0 | Status: DISCONTINUED | OUTPATIENT
Start: 2019-03-29 | End: 2019-04-01

## 2019-03-29 RX ORDER — HEPARIN SODIUM 5000 [USP'U]/ML
5000 INJECTION INTRAVENOUS; SUBCUTANEOUS EVERY 12 HOURS
Qty: 0 | Refills: 0 | Status: DISCONTINUED | OUTPATIENT
Start: 2019-03-29 | End: 2019-04-01

## 2019-03-29 RX ORDER — DOXERCALCIFEROL 2.5 UG/1
1 CAPSULE ORAL
Qty: 0 | Refills: 0 | Status: DISCONTINUED | OUTPATIENT
Start: 2019-03-29 | End: 2019-04-01

## 2019-03-29 RX ADMIN — LORATADINE 10 MILLIGRAM(S): 10 TABLET ORAL at 18:48

## 2019-03-29 RX ADMIN — FINASTERIDE 5 MILLIGRAM(S): 5 TABLET, FILM COATED ORAL at 22:09

## 2019-03-29 RX ADMIN — ATORVASTATIN CALCIUM 20 MILLIGRAM(S): 80 TABLET, FILM COATED ORAL at 09:39

## 2019-03-29 RX ADMIN — Medication 5 MILLIGRAM(S): at 05:48

## 2019-03-29 RX ADMIN — LIDOCAINE AND PRILOCAINE CREAM 1 APPLICATION(S): 25; 25 CREAM TOPICAL at 12:34

## 2019-03-29 RX ADMIN — DOXERCALCIFEROL 1 MICROGRAM(S): 2.5 CAPSULE ORAL at 16:52

## 2019-03-29 RX ADMIN — Medication 650 MILLIGRAM(S): at 05:48

## 2019-03-29 RX ADMIN — Medication 1000 UNIT(S): at 18:48

## 2019-03-29 RX ADMIN — ERYTHROPOIETIN 6000 UNIT(S): 10000 INJECTION, SOLUTION INTRAVENOUS; SUBCUTANEOUS at 16:53

## 2019-03-29 RX ADMIN — Medication 1 SPRAY(S): at 18:50

## 2019-03-29 RX ADMIN — Medication 81 MILLIGRAM(S): at 18:48

## 2019-03-29 RX ADMIN — HEPARIN SODIUM 5000 UNIT(S): 5000 INJECTION INTRAVENOUS; SUBCUTANEOUS at 19:06

## 2019-03-29 RX ADMIN — Medication 100 MILLIGRAM(S): at 18:48

## 2019-03-29 RX ADMIN — SEVELAMER CARBONATE 800 MILLIGRAM(S): 2400 POWDER, FOR SUSPENSION ORAL at 09:39

## 2019-03-29 RX ADMIN — CHOLESTYRAMINE 4 GRAM(S): 4 POWDER, FOR SUSPENSION ORAL at 09:39

## 2019-03-29 RX ADMIN — Medication 0.5 MILLIGRAM(S): at 20:12

## 2019-03-29 RX ADMIN — SEVELAMER CARBONATE 800 MILLIGRAM(S): 2400 POWDER, FOR SUSPENSION ORAL at 12:34

## 2019-03-29 RX ADMIN — PANTOPRAZOLE SODIUM 40 MILLIGRAM(S): 20 TABLET, DELAYED RELEASE ORAL at 05:48

## 2019-03-29 RX ADMIN — SEVELAMER CARBONATE 800 MILLIGRAM(S): 2400 POWDER, FOR SUSPENSION ORAL at 18:50

## 2019-03-29 RX ADMIN — Medication 650 MILLIGRAM(S): at 18:48

## 2019-03-29 NOTE — CONSULT NOTE ADULT - SUBJECTIVE AND OBJECTIVE BOX
Chief complaints.   Pt admitted for evaluation of Right Subclavian and Innominate vein stenoses.    HPI:  85 yo man with ESRD on maintenance HD for several months.   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  Post elective angioplasty.  Planned for Left LE angiogram due to non-healing foot wound.  Reports feeling well.  No new complaints.      PMHX and PSHX.  1.CAD ( RCA and LAD Bare metal stents in 2016)  2.A FIB on A/C  3.CHF-diastolic  4.HTN  5.Hx of UGIB  6.COPD  7.DM  8.Hx of DVT/IVC filter  9.PVD -post Right AKA   10.Hx of C diff colitis  11.Hx of BPH :S/p Green Light laser.                                                                                                                                                                                                                                                                                                                   FAMILY HISTORY:  Family history of colorectal cancer (Father)  Family history of diabetes mellitus (Mother, Sibling)  Family history of hypertension (Mother)      SOCIAL HISTORY : Former smoker, NO ETOH.  Allergies    Zosyn (Rash)    REVIEW OF SYSTEMS :  Feeling well.  Denies SOB  Denies Abdominal pain  Positive pain in buttock area    MEDICATIONS  (STANDING):  acetaminophen   Tablet .. 650 milliGRAM(s) Oral every 6 hours  acetaminophen  IVPB .. 1000 milliGRAM(s) IV Intermittent once  allopurinol 100 milliGRAM(s) Oral daily  aspirin  chewable 81 milliGRAM(s) Oral daily  atorvastatin 20 milliGRAM(s) Oral at bedtime  buDESOnide   0.5 milliGRAM(s) Respule 0.5 milliGRAM(s) Inhalation two times a day  cholecalciferol 1000 Unit(s) Oral daily  cholestyramine Powder (Sugar-Free) 4 Gram(s) Oral daily  diltiazem    Tablet 60 milliGRAM(s) Oral four times a day  finasteride 5 milliGRAM(s) Oral daily  fluticasone propionate (50 MICROgram(s)/actuation) Nasal Spray - Peds 1 Spray(s) Alternating Nostrils daily  loratadine 10 milliGRAM(s) Oral daily  methotrexate 10 milliGRAM(s) Oral every week  pantoprazole    Tablet 40 milliGRAM(s) Oral before breakfast  predniSONE   Tablet 5 milliGRAM(s) Oral daily  sevelamer hydrochloride Oral Tab/Cap - Peds 800 milliGRAM(s) Oral three times a day with meals  silver sulfADIAZINE 1% Cream 1 Application(s) Topical daily  sodium chloride 0.9%. 1000 milliLiter(s) (75 mL/Hr) IV Continuous <Continuous>    MEDICATIONS  (PRN):  fentaNYL    Injectable 50 MICROGram(s) IV Push every 10 minutes PRN Severe Pain (7 - 10)  nitroglycerin     SubLingual 0.4 milliGRAM(s) SubLingual daily PRN Chest Pain  oxyCODONE    IR 5 milliGRAM(s) Oral once PRN Moderate Pain (4 - 6)         Vital Signs Last 24 Hrs  T(C): 36.3 (28 Mar 2019 12:00), Max: 36.7 (28 Mar 2019 06:43)  T(F): 97.3 (28 Mar 2019 12:00), Max: 98.1 (28 Mar 2019 06:43)  HR: 78 (28 Mar 2019 12:00) (77 - 88)  BP: 129/42 (28 Mar 2019 12:00) (129/42 - 156/46)  BP(mean): --  RR: 18 (28 Mar 2019 12:00) (12 - 18)  SpO2: 98% (28 Mar 2019 12:00) (96% - 98%)  Daily Height in cm: 170.18 (28 Mar 2019 06:43)    Daily   I&O's Summary    28 Mar 2019 07:01  -  28 Mar 2019 13:13  --------------------------------------------------------  IN: 700 mL / OUT: 0 mL / NET: 700 mL      PHYSICAL EXAM:  alert and comfortable  GEN: no distress  HEENT: WNL  NECK : supple  CV: S1S2 RRR  LUNGS: Clear to aus  ABD: soft  EXT: Left foot in dressing ..  Edema much improved.    LABS:                        10.2   2.89  )-----------( Clumped    ( 28 Mar 2019 06:43 )             31.7     03-28    141  |  107  |  28<H>  ----------------------------<  89  4.0   |  28  |  3.63<H>    Ca    8.5      28 Mar 2019 06:43      PT/INR - ( 28 Mar 2019 06:43 )   PT: 12.1 sec;   INR: 1.09 ratio         PTT - ( 28 Mar 2019 06:43 )  PTT:31.4 sec
HPI:  85 y/o male with CAD, s/p stentsm PVD, s/p RLE AKA, diastolic HF, ESRD on HD s/p angioplasty of right subclavian vein due to persistent edema of RUE post fistula placement.  Pt also to have femoral endarterectomy on  Monday.  Medicine consult requested for medical management.  3/29/19: No cp, sob, n/v/f/c; feels as though getting a sore on his bottom.    PAST MEDICAL & SURGICAL HISTORY:  Above knee amputation of right lower extremity  Recurrent Clostridium difficile diarrhea  Diastolic CHF  Peripheral vascular disease  Afib  Anemia  CKD (chronic kidney disease)  COPD (chronic obstructive pulmonary disease)  SHAYY (obstructive sleep apnea)  Sepsis, due to unspecified organism: 2/2 poorly healing wounds b/l  Dyspepsia: On moderate exertion.  Sleep apnea, obstructive: Requires home 02 therapy, and treatment with BIPAP  Atelectasis  Pleural effusion, bilateral  Respiratory failure  Peripheral edema  CRI (chronic renal insufficiency)  Gout  Benign prostatic hypertrophy  Spinal stenosis  Hypercholesterolemia  GERD (gastroesophageal reflux disease)  CAD (coronary artery disease)  Hypertension  S/P angioplasty with stent  Cataract of left eye  Prostate: Surgery green light procedure.  S/P rotator cuff surgery: Right  S/P angioplasty      FAMILY HISTORY:    Family history of colorectal cancer (Father)  Family history of diabetes mellitus (Mother, Sibling)  Family history of hypertension (Mother)      SOCIAL HISTORY:  former smoking hx, no alcohol, no drugs    REVIEW OF SYSTEMS:   All 10 systems reviewed in detailed and found to be negative with the exception of what has already been described above    MEDICATIONS  (STANDING):  acetaminophen   Tablet .. 650 milliGRAM(s) Oral every 6 hours  allopurinol 100 milliGRAM(s) Oral daily  aspirin  chewable 81 milliGRAM(s) Oral daily  atorvastatin 20 milliGRAM(s) Oral at bedtime  buDESOnide   0.5 milliGRAM(s) Respule 0.5 milliGRAM(s) Inhalation two times a day  cholecalciferol 1000 Unit(s) Oral daily  cholestyramine Powder (Sugar-Free) 4 Gram(s) Oral daily  diltiazem    Tablet 60 milliGRAM(s) Oral four times a day  doxercalciferol Injectable 1 MICROGram(s) IV Push <User Schedule>  epoetin nelly Injectable 6000 Unit(s) IV Push <User Schedule>  finasteride 5 milliGRAM(s) Oral daily  fluticasone propionate (50 MICROgram(s)/actuation) Nasal Spray - Peds 1 Spray(s) Alternating Nostrils daily  lidocaine/prilocaine Cream 1 Application(s) Topical <User Schedule>  loratadine 10 milliGRAM(s) Oral daily  methotrexate 10 milliGRAM(s) Oral <User Schedule>  pantoprazole    Tablet 40 milliGRAM(s) Oral before breakfast  predniSONE   Tablet 5 milliGRAM(s) Oral daily  sevelamer hydrochloride Oral Tab/Cap - Peds 800 milliGRAM(s) Oral three times a day with meals  silver sulfADIAZINE 1% Cream 1 Application(s) Topical daily    MEDICATIONS  (PRN):  nitroglycerin     SubLingual 0.4 milliGRAM(s) SubLingual daily PRN Chest Pain      Allergies    Zosyn (Rash)    Intolerances          PHYSICAL EXAM:    Vital Signs Last 24 Hrs  T(C): 36.6 (29 Mar 2019 14:19), Max: 36.8 (29 Mar 2019 12:27)  T(F): 97.8 (29 Mar 2019 14:19), Max: 98.2 (29 Mar 2019 12:27)  HR: 87 (29 Mar 2019 16:51) (70 - 87)  BP: 139/58 (29 Mar 2019 16:51) (128/53 - 142/54)  BP(mean): --  RR: 17 (29 Mar 2019 16:51) (16 - 18)  SpO2: 95% (29 Mar 2019 12:27) (95% - 98%)    GEN: A and O, NAD, PLEASANT mood stable  HEENT:  NC/AT, EOMI, no oropharyngeal lesions    NECK:   supple    CV:  +S1, +S2, regular, no murmurs or rubs    RESP:   lungs clear to auscultation bilaterally, no wheezing, rales, rhonchi, good air entry bilaterally RIGHT CHEST CATHETER    GI:  abdomen soft, non-tender, non-distended, normal BS,  no abdominal masses, no palpable masses    RECTAL:  not examined    :  not examined    MSK:   normal muscle tone, no atrophy, no rigidity, no contractions    EXT:   no clubbing, no cyanosis, RUE EDEMA, POS FISTULA no calf pain, swelling or erythema, RIGHT AKA, LLE WITH DRESSING C/D/I    VASCULAR:  pulses equal and symmetric in the upper and lower extremities    NEURO:  AAOX3, no focal neurological deficits, follows all commands, able to move extremities spontaneously    SKIN:  no ulcers, lesions or rashes    LABS/IMAGIN.1    2.83  )-----------( 104      ( 29 Mar 2019 07:06 )             28.9     03-    141  |  107  |  46<H>  ----------------------------<  74  4.4   |  23  |  4.74<H>    Ca    7.9<L>      29 Mar 2019 07:06  Phos  3.6     -            PT/INR - ( 28 Mar 2019 06:43 )   PT: 12.1 sec;   INR: 1.09 ratio         PTT - ( 28 Mar 2019 06:43 )  PTT:31.4 sec                                  EKG:     RADIOLOGY STUDIES:      DVT PROPHYLAXIS:

## 2019-03-29 NOTE — CONSULT NOTE ADULT - ASSESSMENT
85 yo man with ESRD on maintenance HD admitted for Angioplasty of Right Subclavian and Innominate vein stenoses to improve AVF maturation.  --ESRD :  Continue TIW HD   --AVF : d/w Dr. Mccracken.  Start cannulation tomorrow.  --PVD : for evaluation on 4/1.  --Fluid/Electrolytes stable.
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

## 2019-03-29 NOTE — PHYSICAL THERAPY INITIAL EVALUATION ADULT - MODALITIES TREATMENT COMMENTS
pt left in bed supine post Eval; bed alarm on; pillow under L ankle / heel unloaded; callbell in reach; pt instructed not to get up alone; call nursing for assist; vida well; denied pain; isolation maintained

## 2019-03-30 LAB
ALLERGY+IMMUNOLOGY DIAG STUDY NOTE: SIGNIFICANT CHANGE UP
HCT VFR BLD CALC: 31.4 % — LOW (ref 39–50)
HGB BLD-MCNC: 9.7 G/DL — LOW (ref 13–17)
MCHC RBC-ENTMCNC: 30.9 GM/DL — LOW (ref 32–36)
MCHC RBC-ENTMCNC: 33.4 PG — SIGNIFICANT CHANGE UP (ref 27–34)
MCV RBC AUTO: 108.3 FL — HIGH (ref 80–100)
NRBC # BLD: 0 /100 WBCS — SIGNIFICANT CHANGE UP (ref 0–0)
PLATELET # BLD AUTO: 121 K/UL — LOW (ref 150–400)
RBC # BLD: 2.9 M/UL — LOW (ref 4.2–5.8)
RBC # FLD: 20.2 % — HIGH (ref 10.3–14.5)
WBC # BLD: 5.44 K/UL — SIGNIFICANT CHANGE UP (ref 3.8–10.5)
WBC # FLD AUTO: 5.44 K/UL — SIGNIFICANT CHANGE UP (ref 3.8–10.5)

## 2019-03-30 RX ADMIN — ATORVASTATIN CALCIUM 20 MILLIGRAM(S): 80 TABLET, FILM COATED ORAL at 12:24

## 2019-03-30 RX ADMIN — SEVELAMER CARBONATE 800 MILLIGRAM(S): 2400 POWDER, FOR SUSPENSION ORAL at 12:26

## 2019-03-30 RX ADMIN — PANTOPRAZOLE SODIUM 40 MILLIGRAM(S): 20 TABLET, DELAYED RELEASE ORAL at 05:29

## 2019-03-30 RX ADMIN — METHOTREXATE 10 MILLIGRAM(S): 2.5 TABLET ORAL at 12:26

## 2019-03-30 RX ADMIN — HEPARIN SODIUM 5000 UNIT(S): 5000 INJECTION INTRAVENOUS; SUBCUTANEOUS at 05:29

## 2019-03-30 RX ADMIN — Medication 650 MILLIGRAM(S): at 05:28

## 2019-03-30 RX ADMIN — Medication 0.5 MILLIGRAM(S): at 20:56

## 2019-03-30 RX ADMIN — Medication 650 MILLIGRAM(S): at 17:08

## 2019-03-30 RX ADMIN — HEPARIN SODIUM 5000 UNIT(S): 5000 INJECTION INTRAVENOUS; SUBCUTANEOUS at 17:08

## 2019-03-30 RX ADMIN — FINASTERIDE 5 MILLIGRAM(S): 5 TABLET, FILM COATED ORAL at 20:11

## 2019-03-30 RX ADMIN — SEVELAMER CARBONATE 800 MILLIGRAM(S): 2400 POWDER, FOR SUSPENSION ORAL at 17:08

## 2019-03-30 RX ADMIN — Medication 81 MILLIGRAM(S): at 12:24

## 2019-03-30 RX ADMIN — Medication 1000 UNIT(S): at 12:24

## 2019-03-30 RX ADMIN — Medication 650 MILLIGRAM(S): at 05:49

## 2019-03-30 RX ADMIN — Medication 1 APPLICATION(S): at 12:27

## 2019-03-30 RX ADMIN — Medication 650 MILLIGRAM(S): at 12:26

## 2019-03-30 RX ADMIN — Medication 650 MILLIGRAM(S): at 00:59

## 2019-03-30 RX ADMIN — Medication 650 MILLIGRAM(S): at 23:30

## 2019-03-30 RX ADMIN — CHOLESTYRAMINE 4 GRAM(S): 4 POWDER, FOR SUSPENSION ORAL at 10:50

## 2019-03-30 RX ADMIN — Medication 5 MILLIGRAM(S): at 05:28

## 2019-03-30 RX ADMIN — Medication 100 MILLIGRAM(S): at 12:25

## 2019-03-30 RX ADMIN — LORATADINE 10 MILLIGRAM(S): 10 TABLET ORAL at 12:25

## 2019-03-30 RX ADMIN — SEVELAMER CARBONATE 800 MILLIGRAM(S): 2400 POWDER, FOR SUSPENSION ORAL at 10:04

## 2019-03-30 RX ADMIN — Medication 1 SPRAY(S): at 12:25

## 2019-03-31 PROCEDURE — 71045 X-RAY EXAM CHEST 1 VIEW: CPT | Mod: 26

## 2019-03-31 RX ADMIN — Medication 650 MILLIGRAM(S): at 23:25

## 2019-03-31 RX ADMIN — Medication 1 SPRAY(S): at 11:07

## 2019-03-31 RX ADMIN — LORATADINE 10 MILLIGRAM(S): 10 TABLET ORAL at 11:05

## 2019-03-31 RX ADMIN — Medication 650 MILLIGRAM(S): at 17:20

## 2019-03-31 RX ADMIN — Medication 5 MILLIGRAM(S): at 05:16

## 2019-03-31 RX ADMIN — SEVELAMER CARBONATE 800 MILLIGRAM(S): 2400 POWDER, FOR SUSPENSION ORAL at 07:55

## 2019-03-31 RX ADMIN — FINASTERIDE 5 MILLIGRAM(S): 5 TABLET, FILM COATED ORAL at 23:29

## 2019-03-31 RX ADMIN — Medication 1 APPLICATION(S): at 11:07

## 2019-03-31 RX ADMIN — Medication 100 MILLIGRAM(S): at 11:05

## 2019-03-31 RX ADMIN — Medication 81 MILLIGRAM(S): at 11:05

## 2019-03-31 RX ADMIN — Medication 0.5 MILLIGRAM(S): at 09:15

## 2019-03-31 RX ADMIN — ATORVASTATIN CALCIUM 20 MILLIGRAM(S): 80 TABLET, FILM COATED ORAL at 07:54

## 2019-03-31 RX ADMIN — Medication 1000 UNIT(S): at 11:05

## 2019-03-31 RX ADMIN — HEPARIN SODIUM 5000 UNIT(S): 5000 INJECTION INTRAVENOUS; SUBCUTANEOUS at 05:16

## 2019-03-31 RX ADMIN — Medication 0.5 MILLIGRAM(S): at 19:49

## 2019-03-31 RX ADMIN — SEVELAMER CARBONATE 800 MILLIGRAM(S): 2400 POWDER, FOR SUSPENSION ORAL at 11:05

## 2019-03-31 RX ADMIN — Medication 650 MILLIGRAM(S): at 05:16

## 2019-03-31 RX ADMIN — PANTOPRAZOLE SODIUM 40 MILLIGRAM(S): 20 TABLET, DELAYED RELEASE ORAL at 05:16

## 2019-03-31 RX ADMIN — HEPARIN SODIUM 5000 UNIT(S): 5000 INJECTION INTRAVENOUS; SUBCUTANEOUS at 17:20

## 2019-03-31 RX ADMIN — CHOLESTYRAMINE 4 GRAM(S): 4 POWDER, FOR SUSPENSION ORAL at 10:03

## 2019-03-31 RX ADMIN — SEVELAMER CARBONATE 800 MILLIGRAM(S): 2400 POWDER, FOR SUSPENSION ORAL at 17:20

## 2019-03-31 RX ADMIN — Medication 650 MILLIGRAM(S): at 11:06

## 2019-04-01 ENCOUNTER — RESULT REVIEW (OUTPATIENT)
Age: 84
End: 2019-04-01

## 2019-04-01 DIAGNOSIS — I73.9 PERIPHERAL VASCULAR DISEASE, UNSPECIFIED: ICD-10-CM

## 2019-04-01 LAB
ALBUMIN SERPL ELPH-MCNC: 2.6 G/DL — LOW (ref 3.3–5)
ANION GAP SERPL CALC-SCNC: 12 MMOL/L — SIGNIFICANT CHANGE UP (ref 5–17)
ANION GAP SERPL CALC-SCNC: 13 MMOL/L — SIGNIFICANT CHANGE UP (ref 5–17)
BUN SERPL-MCNC: 24 MG/DL — HIGH (ref 7–23)
BUN SERPL-MCNC: 61 MG/DL — HIGH (ref 7–23)
CALCIUM SERPL-MCNC: 7.6 MG/DL — LOW (ref 8.5–10.1)
CALCIUM SERPL-MCNC: 7.9 MG/DL — LOW (ref 8.5–10.1)
CHLORIDE SERPL-SCNC: 104 MMOL/L — SIGNIFICANT CHANGE UP (ref 96–108)
CHLORIDE SERPL-SCNC: 109 MMOL/L — HIGH (ref 96–108)
CO2 SERPL-SCNC: 18 MMOL/L — LOW (ref 22–31)
CO2 SERPL-SCNC: 24 MMOL/L — SIGNIFICANT CHANGE UP (ref 22–31)
CREAT SERPL-MCNC: 3.15 MG/DL — HIGH (ref 0.5–1.3)
CREAT SERPL-MCNC: 5.82 MG/DL — HIGH (ref 0.5–1.3)
GLUCOSE SERPL-MCNC: 151 MG/DL — HIGH (ref 70–99)
GLUCOSE SERPL-MCNC: 95 MG/DL — SIGNIFICANT CHANGE UP (ref 70–99)
HCT VFR BLD CALC: 30.2 % — LOW (ref 39–50)
HCT VFR BLD CALC: 36.1 % — LOW (ref 39–50)
HGB BLD-MCNC: 12.1 G/DL — LOW (ref 13–17)
HGB BLD-MCNC: 9.6 G/DL — LOW (ref 13–17)
MCHC RBC-ENTMCNC: 31.8 GM/DL — LOW (ref 32–36)
MCHC RBC-ENTMCNC: 32.5 PG — SIGNIFICANT CHANGE UP (ref 27–34)
MCHC RBC-ENTMCNC: 33.5 GM/DL — SIGNIFICANT CHANGE UP (ref 32–36)
MCHC RBC-ENTMCNC: 33.9 PG — SIGNIFICANT CHANGE UP (ref 27–34)
MCV RBC AUTO: 106.7 FL — HIGH (ref 80–100)
MCV RBC AUTO: 97 FL — SIGNIFICANT CHANGE UP (ref 80–100)
NRBC # BLD: 0 /100 WBCS — SIGNIFICANT CHANGE UP (ref 0–0)
NRBC # BLD: 0 /100 WBCS — SIGNIFICANT CHANGE UP (ref 0–0)
PHOSPHATE SERPL-MCNC: 5.6 MG/DL — HIGH (ref 2.5–4.5)
PLATELET # BLD AUTO: 133 K/UL — LOW (ref 150–400)
PLATELET # BLD AUTO: 162 K/UL — SIGNIFICANT CHANGE UP (ref 150–400)
POTASSIUM SERPL-MCNC: 4.9 MMOL/L — SIGNIFICANT CHANGE UP (ref 3.5–5.3)
POTASSIUM SERPL-MCNC: 4.9 MMOL/L — SIGNIFICANT CHANGE UP (ref 3.5–5.3)
POTASSIUM SERPL-SCNC: 4.9 MMOL/L — SIGNIFICANT CHANGE UP (ref 3.5–5.3)
POTASSIUM SERPL-SCNC: 4.9 MMOL/L — SIGNIFICANT CHANGE UP (ref 3.5–5.3)
RBC # BLD: 2.83 M/UL — LOW (ref 4.2–5.8)
RBC # BLD: 3.72 M/UL — LOW (ref 4.2–5.8)
RBC # FLD: 18.3 % — HIGH (ref 10.3–14.5)
RBC # FLD: 20.2 % — HIGH (ref 10.3–14.5)
SODIUM SERPL-SCNC: 140 MMOL/L — SIGNIFICANT CHANGE UP (ref 135–145)
SODIUM SERPL-SCNC: 140 MMOL/L — SIGNIFICANT CHANGE UP (ref 135–145)
WBC # BLD: 4.98 K/UL — SIGNIFICANT CHANGE UP (ref 3.8–10.5)
WBC # BLD: 5.94 K/UL — SIGNIFICANT CHANGE UP (ref 3.8–10.5)
WBC # FLD AUTO: 4.98 K/UL — SIGNIFICANT CHANGE UP (ref 3.8–10.5)
WBC # FLD AUTO: 5.94 K/UL — SIGNIFICANT CHANGE UP (ref 3.8–10.5)

## 2019-04-01 PROCEDURE — 88311 DECALCIFY TISSUE: CPT | Mod: 26

## 2019-04-01 PROCEDURE — 75630 X-RAY AORTA LEG ARTERIES: CPT | Mod: 26,AS

## 2019-04-01 PROCEDURE — 88304 TISSUE EXAM BY PATHOLOGIST: CPT | Mod: 26

## 2019-04-01 PROCEDURE — 35371 RECHANNELING OF ARTERY: CPT | Mod: AS,LT

## 2019-04-01 RX ORDER — OXYCODONE HYDROCHLORIDE 5 MG/1
5 TABLET ORAL EVERY 4 HOURS
Qty: 0 | Refills: 0 | Status: DISCONTINUED | OUTPATIENT
Start: 2019-04-01 | End: 2019-04-03

## 2019-04-01 RX ORDER — MEPERIDINE HYDROCHLORIDE 50 MG/ML
12.5 INJECTION INTRAMUSCULAR; INTRAVENOUS; SUBCUTANEOUS
Qty: 0 | Refills: 0 | Status: DISCONTINUED | OUTPATIENT
Start: 2019-04-01 | End: 2019-04-01

## 2019-04-01 RX ORDER — CHOLECALCIFEROL (VITAMIN D3) 125 MCG
1000 CAPSULE ORAL DAILY
Qty: 0 | Refills: 0 | Status: DISCONTINUED | OUTPATIENT
Start: 2019-04-01 | End: 2019-04-11

## 2019-04-01 RX ORDER — SODIUM CHLORIDE 9 MG/ML
500 INJECTION INTRAMUSCULAR; INTRAVENOUS; SUBCUTANEOUS ONCE
Qty: 0 | Refills: 0 | Status: COMPLETED | OUTPATIENT
Start: 2019-04-01 | End: 2019-04-01

## 2019-04-01 RX ORDER — PANTOPRAZOLE SODIUM 20 MG/1
40 TABLET, DELAYED RELEASE ORAL
Qty: 0 | Refills: 0 | Status: DISCONTINUED | OUTPATIENT
Start: 2019-04-01 | End: 2019-04-11

## 2019-04-01 RX ORDER — DILTIAZEM HCL 120 MG
60 CAPSULE, EXT RELEASE 24 HR ORAL
Qty: 0 | Refills: 0 | Status: DISCONTINUED | OUTPATIENT
Start: 2019-04-01 | End: 2019-04-11

## 2019-04-01 RX ORDER — DOXERCALCIFEROL 2.5 UG/1
1 CAPSULE ORAL
Qty: 0 | Refills: 0 | Status: DISCONTINUED | OUTPATIENT
Start: 2019-04-01 | End: 2019-04-11

## 2019-04-01 RX ORDER — LORATADINE 10 MG/1
10 TABLET ORAL DAILY
Qty: 0 | Refills: 0 | Status: DISCONTINUED | OUTPATIENT
Start: 2019-04-01 | End: 2019-04-11

## 2019-04-01 RX ORDER — SODIUM CHLORIDE 9 MG/ML
1000 INJECTION INTRAMUSCULAR; INTRAVENOUS; SUBCUTANEOUS
Qty: 0 | Refills: 0 | Status: DISCONTINUED | OUTPATIENT
Start: 2019-04-01 | End: 2019-04-01

## 2019-04-01 RX ORDER — FINASTERIDE 5 MG/1
5 TABLET, FILM COATED ORAL DAILY
Qty: 0 | Refills: 0 | Status: DISCONTINUED | OUTPATIENT
Start: 2019-04-01 | End: 2019-04-11

## 2019-04-01 RX ORDER — SEVELAMER CARBONATE 2400 MG/1
800 POWDER, FOR SUSPENSION ORAL
Qty: 0 | Refills: 0 | Status: DISCONTINUED | OUTPATIENT
Start: 2019-04-01 | End: 2019-04-10

## 2019-04-01 RX ORDER — CHOLESTYRAMINE 4 G/9G
4 POWDER, FOR SUSPENSION ORAL DAILY
Qty: 0 | Refills: 0 | Status: DISCONTINUED | OUTPATIENT
Start: 2019-04-01 | End: 2019-04-11

## 2019-04-01 RX ORDER — OXYCODONE HYDROCHLORIDE 5 MG/1
5 TABLET ORAL ONCE
Qty: 0 | Refills: 0 | Status: DISCONTINUED | OUTPATIENT
Start: 2019-04-01 | End: 2019-04-01

## 2019-04-01 RX ORDER — METHOTREXATE 2.5 MG/1
10 TABLET ORAL
Qty: 0 | Refills: 0 | Status: DISCONTINUED | OUTPATIENT
Start: 2019-04-01 | End: 2019-04-11

## 2019-04-01 RX ORDER — HYDROMORPHONE HYDROCHLORIDE 2 MG/ML
0.5 INJECTION INTRAMUSCULAR; INTRAVENOUS; SUBCUTANEOUS EVERY 4 HOURS
Qty: 0 | Refills: 0 | Status: DISCONTINUED | OUTPATIENT
Start: 2019-04-01 | End: 2019-04-01

## 2019-04-01 RX ORDER — BUDESONIDE, MICRONIZED 100 %
0.5 POWDER (GRAM) MISCELLANEOUS
Qty: 0 | Refills: 0 | Status: DISCONTINUED | OUTPATIENT
Start: 2019-04-01 | End: 2019-04-11

## 2019-04-01 RX ORDER — ALLOPURINOL 300 MG
100 TABLET ORAL DAILY
Qty: 0 | Refills: 0 | Status: DISCONTINUED | OUTPATIENT
Start: 2019-04-01 | End: 2019-04-11

## 2019-04-01 RX ORDER — ATORVASTATIN CALCIUM 80 MG/1
20 TABLET, FILM COATED ORAL AT BEDTIME
Qty: 0 | Refills: 0 | Status: DISCONTINUED | OUTPATIENT
Start: 2019-04-01 | End: 2019-04-11

## 2019-04-01 RX ORDER — ONDANSETRON 8 MG/1
4 TABLET, FILM COATED ORAL ONCE
Qty: 0 | Refills: 0 | Status: DISCONTINUED | OUTPATIENT
Start: 2019-04-01 | End: 2019-04-01

## 2019-04-01 RX ORDER — FLUTICASONE PROPIONATE 50 MCG
1 SPRAY, SUSPENSION NASAL DAILY
Qty: 0 | Refills: 0 | Status: DISCONTINUED | OUTPATIENT
Start: 2019-04-01 | End: 2019-04-11

## 2019-04-01 RX ORDER — ERYTHROPOIETIN 10000 [IU]/ML
6000 INJECTION, SOLUTION INTRAVENOUS; SUBCUTANEOUS
Qty: 0 | Refills: 0 | Status: DISCONTINUED | OUTPATIENT
Start: 2019-04-01 | End: 2019-04-11

## 2019-04-01 RX ORDER — HEPARIN SODIUM 5000 [USP'U]/ML
5000 INJECTION INTRAVENOUS; SUBCUTANEOUS EVERY 12 HOURS
Qty: 0 | Refills: 0 | Status: DISCONTINUED | OUTPATIENT
Start: 2019-04-02 | End: 2019-04-06

## 2019-04-01 RX ORDER — SEVELAMER CARBONATE 2400 MG/1
800 POWDER, FOR SUSPENSION ORAL
Qty: 0 | Refills: 0 | Status: DISCONTINUED | OUTPATIENT
Start: 2019-04-01 | End: 2019-04-01

## 2019-04-01 RX ORDER — ACETAMINOPHEN 500 MG
650 TABLET ORAL EVERY 6 HOURS
Qty: 0 | Refills: 0 | Status: DISCONTINUED | OUTPATIENT
Start: 2019-04-01 | End: 2019-04-11

## 2019-04-01 RX ORDER — ACETAMINOPHEN 500 MG
1000 TABLET ORAL ONCE
Qty: 0 | Refills: 0 | Status: COMPLETED | OUTPATIENT
Start: 2019-04-01 | End: 2019-04-01

## 2019-04-01 RX ORDER — NITROGLYCERIN 6.5 MG
0.4 CAPSULE, EXTENDED RELEASE ORAL DAILY
Qty: 0 | Refills: 0 | Status: DISCONTINUED | OUTPATIENT
Start: 2019-04-01 | End: 2019-04-11

## 2019-04-01 RX ORDER — LIDOCAINE AND PRILOCAINE CREAM 25; 25 MG/G; MG/G
1 CREAM TOPICAL
Qty: 0 | Refills: 0 | Status: DISCONTINUED | OUTPATIENT
Start: 2019-04-01 | End: 2019-04-11

## 2019-04-01 RX ORDER — HYDROMORPHONE HYDROCHLORIDE 2 MG/ML
0.5 INJECTION INTRAMUSCULAR; INTRAVENOUS; SUBCUTANEOUS
Qty: 0 | Refills: 0 | Status: DISCONTINUED | OUTPATIENT
Start: 2019-04-01 | End: 2019-04-01

## 2019-04-01 RX ORDER — ASPIRIN/CALCIUM CARB/MAGNESIUM 324 MG
81 TABLET ORAL DAILY
Qty: 0 | Refills: 0 | Status: DISCONTINUED | OUTPATIENT
Start: 2019-04-02 | End: 2019-04-11

## 2019-04-01 RX ORDER — SEVELAMER CARBONATE 2400 MG/1
800 POWDER, FOR SUSPENSION ORAL
Qty: 0 | Refills: 0 | Status: DISCONTINUED | OUTPATIENT
Start: 2019-04-01 | End: 2019-04-03

## 2019-04-01 RX ADMIN — Medication 650 MILLIGRAM(S): at 23:24

## 2019-04-01 RX ADMIN — OXYCODONE HYDROCHLORIDE 5 MILLIGRAM(S): 5 TABLET ORAL at 18:56

## 2019-04-01 RX ADMIN — OXYCODONE HYDROCHLORIDE 5 MILLIGRAM(S): 5 TABLET ORAL at 19:04

## 2019-04-01 RX ADMIN — PANTOPRAZOLE SODIUM 40 MILLIGRAM(S): 20 TABLET, DELAYED RELEASE ORAL at 06:52

## 2019-04-01 RX ADMIN — LIDOCAINE AND PRILOCAINE CREAM 1 APPLICATION(S): 25; 25 CREAM TOPICAL at 06:52

## 2019-04-01 RX ADMIN — Medication 0.5 MILLIGRAM(S): at 23:33

## 2019-04-01 RX ADMIN — Medication 5 MILLIGRAM(S): at 06:53

## 2019-04-01 RX ADMIN — Medication 100 MILLIGRAM(S): at 23:24

## 2019-04-01 RX ADMIN — Medication 650 MILLIGRAM(S): at 06:52

## 2019-04-01 RX ADMIN — ERYTHROPOIETIN 6000 UNIT(S): 10000 INJECTION, SOLUTION INTRAVENOUS; SUBCUTANEOUS at 09:56

## 2019-04-01 RX ADMIN — SODIUM CHLORIDE 1000 MILLILITER(S): 9 INJECTION INTRAMUSCULAR; INTRAVENOUS; SUBCUTANEOUS at 18:05

## 2019-04-01 RX ADMIN — ATORVASTATIN CALCIUM 20 MILLIGRAM(S): 80 TABLET, FILM COATED ORAL at 23:24

## 2019-04-01 RX ADMIN — Medication 400 MILLIGRAM(S): at 17:53

## 2019-04-01 RX ADMIN — DOXERCALCIFEROL 1 MICROGRAM(S): 2.5 CAPSULE ORAL at 09:56

## 2019-04-01 NOTE — BRIEF OPERATIVE NOTE - OPERATION/FINDINGS
high grade stenosis of junction of subclavian and innominate veins and stenosis of innominate vein
extremely calcified rock like femoral and iliac arteries

## 2019-04-01 NOTE — BRIEF OPERATIVE NOTE - NSICDXBRIEFPREOP_GEN_ALL_CORE_FT
PRE-OP DIAGNOSIS:  CRF (chronic renal failure), stage 5 28-Mar-2019 10:03:43  Jason Clement
PRE-OP DIAGNOSIS:  PAD (peripheral artery disease) 01-Apr-2019 17:33:52  Jason Clement  PAD (peripheral artery disease) 01-Apr-2019 17:32:53  Jason Clement  CRF (chronic renal failure), stage 5 28-Mar-2019 10:03:43  Jason Clement

## 2019-04-01 NOTE — BRIEF OPERATIVE NOTE - NSICDXBRIEFPOSTOP_GEN_ALL_CORE_FT
POST-OP DIAGNOSIS:  CRF (chronic renal failure), stage 5 28-Mar-2019 10:04:03  Jason Clement
POST-OP DIAGNOSIS:  CRF (chronic renal failure), stage 5 28-Mar-2019 10:04:03  Jason Clement

## 2019-04-01 NOTE — BRIEF OPERATIVE NOTE - NSICDXBRIEFPROCEDURE_GEN_ALL_CORE_FT
PROCEDURES:  Angioplasty, vein, subclavian 28-Mar-2019 10:02:25 angioplasty of R subclavian and innominate vein with 10, 12, 14 mm diameter balloon Jason Clement
PROCEDURES:  Endarterectomy, iliofemoral 01-Apr-2019 17:32:22  Jason Clement  Angioplasty, vein, subclavian 28-Mar-2019 10:02:25 angioplasty of R subclavian and innominate vein with 10, 12, 14 mm diameter balloon Jason Clement

## 2019-04-02 LAB
ANION GAP SERPL CALC-SCNC: 11 MMOL/L — SIGNIFICANT CHANGE UP (ref 5–17)
BUN SERPL-MCNC: 40 MG/DL — HIGH (ref 7–23)
CALCIUM SERPL-MCNC: 7.2 MG/DL — LOW (ref 8.5–10.1)
CHLORIDE SERPL-SCNC: 104 MMOL/L — SIGNIFICANT CHANGE UP (ref 96–108)
CO2 SERPL-SCNC: 26 MMOL/L — SIGNIFICANT CHANGE UP (ref 22–31)
CREAT SERPL-MCNC: 4.03 MG/DL — HIGH (ref 0.5–1.3)
GLUCOSE BLDC GLUCOMTR-MCNC: 103 MG/DL — HIGH (ref 70–99)
GLUCOSE BLDC GLUCOMTR-MCNC: 124 MG/DL — HIGH (ref 70–99)
GLUCOSE SERPL-MCNC: 85 MG/DL — SIGNIFICANT CHANGE UP (ref 70–99)
HCT VFR BLD CALC: 33.7 % — LOW (ref 39–50)
HGB BLD-MCNC: 11.3 G/DL — LOW (ref 13–17)
MCHC RBC-ENTMCNC: 32.4 PG — SIGNIFICANT CHANGE UP (ref 27–34)
MCHC RBC-ENTMCNC: 33.5 GM/DL — SIGNIFICANT CHANGE UP (ref 32–36)
MCV RBC AUTO: 96.6 FL — SIGNIFICANT CHANGE UP (ref 80–100)
NRBC # BLD: 0 /100 WBCS — SIGNIFICANT CHANGE UP (ref 0–0)
PLATELET # BLD AUTO: 150 K/UL — SIGNIFICANT CHANGE UP (ref 150–400)
POTASSIUM SERPL-MCNC: 5.2 MMOL/L — SIGNIFICANT CHANGE UP (ref 3.5–5.3)
POTASSIUM SERPL-SCNC: 5.2 MMOL/L — SIGNIFICANT CHANGE UP (ref 3.5–5.3)
RBC # BLD: 3.49 M/UL — LOW (ref 4.2–5.8)
RBC # FLD: 19.4 % — HIGH (ref 10.3–14.5)
SODIUM SERPL-SCNC: 141 MMOL/L — SIGNIFICANT CHANGE UP (ref 135–145)
WBC # BLD: 3.95 K/UL — SIGNIFICANT CHANGE UP (ref 3.8–10.5)
WBC # FLD AUTO: 3.95 K/UL — SIGNIFICANT CHANGE UP (ref 3.8–10.5)

## 2019-04-02 RX ADMIN — Medication 1 SPRAY(S): at 14:02

## 2019-04-02 RX ADMIN — CHOLESTYRAMINE 4 GRAM(S): 4 POWDER, FOR SUSPENSION ORAL at 08:29

## 2019-04-02 RX ADMIN — SEVELAMER CARBONATE 800 MILLIGRAM(S): 2400 POWDER, FOR SUSPENSION ORAL at 17:18

## 2019-04-02 RX ADMIN — Medication 650 MILLIGRAM(S): at 00:00

## 2019-04-02 RX ADMIN — Medication 60 MILLIGRAM(S): at 17:21

## 2019-04-02 RX ADMIN — Medication 650 MILLIGRAM(S): at 17:20

## 2019-04-02 RX ADMIN — SEVELAMER CARBONATE 800 MILLIGRAM(S): 2400 POWDER, FOR SUSPENSION ORAL at 07:39

## 2019-04-02 RX ADMIN — Medication 100 MILLIGRAM(S): at 12:48

## 2019-04-02 RX ADMIN — Medication 60 MILLIGRAM(S): at 23:30

## 2019-04-02 RX ADMIN — Medication 650 MILLIGRAM(S): at 23:31

## 2019-04-02 RX ADMIN — Medication 5 MILLIGRAM(S): at 05:04

## 2019-04-02 RX ADMIN — Medication 650 MILLIGRAM(S): at 13:45

## 2019-04-02 RX ADMIN — Medication 0.5 MILLIGRAM(S): at 19:46

## 2019-04-02 RX ADMIN — Medication 60 MILLIGRAM(S): at 12:49

## 2019-04-02 RX ADMIN — Medication 650 MILLIGRAM(S): at 12:47

## 2019-04-02 RX ADMIN — Medication 650 MILLIGRAM(S): at 06:00

## 2019-04-02 RX ADMIN — Medication 100 MILLIGRAM(S): at 05:04

## 2019-04-02 RX ADMIN — FINASTERIDE 5 MILLIGRAM(S): 5 TABLET, FILM COATED ORAL at 12:50

## 2019-04-02 RX ADMIN — Medication 0.5 MILLIGRAM(S): at 08:14

## 2019-04-02 RX ADMIN — Medication 60 MILLIGRAM(S): at 05:04

## 2019-04-02 RX ADMIN — Medication 650 MILLIGRAM(S): at 05:05

## 2019-04-02 RX ADMIN — ATORVASTATIN CALCIUM 20 MILLIGRAM(S): 80 TABLET, FILM COATED ORAL at 21:35

## 2019-04-02 RX ADMIN — PANTOPRAZOLE SODIUM 40 MILLIGRAM(S): 20 TABLET, DELAYED RELEASE ORAL at 05:04

## 2019-04-02 RX ADMIN — SEVELAMER CARBONATE 800 MILLIGRAM(S): 2400 POWDER, FOR SUSPENSION ORAL at 12:51

## 2019-04-02 RX ADMIN — LORATADINE 10 MILLIGRAM(S): 10 TABLET ORAL at 12:50

## 2019-04-02 RX ADMIN — Medication 650 MILLIGRAM(S): at 18:00

## 2019-04-02 RX ADMIN — Medication 81 MILLIGRAM(S): at 12:53

## 2019-04-02 RX ADMIN — Medication 1000 UNIT(S): at 12:49

## 2019-04-02 RX ADMIN — HEPARIN SODIUM 5000 UNIT(S): 5000 INJECTION INTRAVENOUS; SUBCUTANEOUS at 17:21

## 2019-04-02 RX ADMIN — HEPARIN SODIUM 5000 UNIT(S): 5000 INJECTION INTRAVENOUS; SUBCUTANEOUS at 05:06

## 2019-04-03 LAB
ALBUMIN SERPL ELPH-MCNC: 2.3 G/DL — LOW (ref 3.3–5)
ANION GAP SERPL CALC-SCNC: 11 MMOL/L — SIGNIFICANT CHANGE UP (ref 5–17)
BUN SERPL-MCNC: 56 MG/DL — HIGH (ref 7–23)
CALCIUM SERPL-MCNC: 7.4 MG/DL — LOW (ref 8.5–10.1)
CHLORIDE SERPL-SCNC: 102 MMOL/L — SIGNIFICANT CHANGE UP (ref 96–108)
CO2 SERPL-SCNC: 22 MMOL/L — SIGNIFICANT CHANGE UP (ref 22–31)
CREAT SERPL-MCNC: 4.95 MG/DL — HIGH (ref 0.5–1.3)
GLUCOSE SERPL-MCNC: 116 MG/DL — HIGH (ref 70–99)
HCT VFR BLD CALC: 30.2 % — LOW (ref 39–50)
HGB BLD-MCNC: 9.9 G/DL — LOW (ref 13–17)
INR BLD: 0.97 RATIO — SIGNIFICANT CHANGE UP (ref 0.88–1.16)
MCHC RBC-ENTMCNC: 32.1 PG — SIGNIFICANT CHANGE UP (ref 27–34)
MCHC RBC-ENTMCNC: 32.8 GM/DL — SIGNIFICANT CHANGE UP (ref 32–36)
MCV RBC AUTO: 98.1 FL — SIGNIFICANT CHANGE UP (ref 80–100)
NRBC # BLD: 0 /100 WBCS — SIGNIFICANT CHANGE UP (ref 0–0)
PHOSPHATE SERPL-MCNC: 6.7 MG/DL — HIGH (ref 2.5–4.5)
PLATELET # BLD AUTO: 156 K/UL — SIGNIFICANT CHANGE UP (ref 150–400)
POTASSIUM SERPL-MCNC: 4.8 MMOL/L — SIGNIFICANT CHANGE UP (ref 3.5–5.3)
POTASSIUM SERPL-SCNC: 4.8 MMOL/L — SIGNIFICANT CHANGE UP (ref 3.5–5.3)
PROTHROM AB SERPL-ACNC: 10.7 SEC — SIGNIFICANT CHANGE UP (ref 10–12.9)
RBC # BLD: 3.08 M/UL — LOW (ref 4.2–5.8)
RBC # FLD: 18.5 % — HIGH (ref 10.3–14.5)
SODIUM SERPL-SCNC: 135 MMOL/L — SIGNIFICANT CHANGE UP (ref 135–145)
SURGICAL PATHOLOGY STUDY: SIGNIFICANT CHANGE UP
WBC # BLD: 3.31 K/UL — LOW (ref 3.8–10.5)
WBC # FLD AUTO: 3.31 K/UL — LOW (ref 3.8–10.5)

## 2019-04-03 RX ORDER — LIDOCAINE HCL 20 MG/ML
0.2 VIAL (ML) INJECTION
Qty: 0 | Refills: 0 | Status: DISCONTINUED | OUTPATIENT
Start: 2019-04-03 | End: 2019-04-11

## 2019-04-03 RX ORDER — ASPIRIN/CALCIUM CARB/MAGNESIUM 324 MG
1 TABLET ORAL
Qty: 0 | Refills: 0 | COMMUNITY

## 2019-04-03 RX ORDER — WARFARIN SODIUM 2.5 MG/1
5 TABLET ORAL DAILY
Qty: 0 | Refills: 0 | Status: DISCONTINUED | OUTPATIENT
Start: 2019-04-03 | End: 2019-04-05

## 2019-04-03 RX ORDER — METHOTREXATE 2.5 MG/1
0 TABLET ORAL
Qty: 0 | Refills: 0 | COMMUNITY

## 2019-04-03 RX ORDER — DOXAZOSIN MESYLATE 4 MG
1 TABLET ORAL
Qty: 0 | Refills: 0 | COMMUNITY

## 2019-04-03 RX ORDER — LORATADINE 10 MG/1
1 TABLET ORAL
Qty: 0 | Refills: 0 | COMMUNITY

## 2019-04-03 RX ORDER — WARFARIN SODIUM 2.5 MG/1
1 TABLET ORAL
Qty: 0 | Refills: 0 | COMMUNITY

## 2019-04-03 RX ORDER — BUDESONIDE, MICRONIZED 100 %
2 POWDER (GRAM) MISCELLANEOUS
Qty: 0 | Refills: 0 | COMMUNITY

## 2019-04-03 RX ORDER — METHOTREXATE 2.5 MG/1
1 TABLET ORAL
Qty: 0 | Refills: 0 | COMMUNITY

## 2019-04-03 RX ADMIN — Medication 650 MILLIGRAM(S): at 23:11

## 2019-04-03 RX ADMIN — WARFARIN SODIUM 5 MILLIGRAM(S): 2.5 TABLET ORAL at 23:12

## 2019-04-03 RX ADMIN — Medication 650 MILLIGRAM(S): at 13:45

## 2019-04-03 RX ADMIN — SEVELAMER CARBONATE 800 MILLIGRAM(S): 2400 POWDER, FOR SUSPENSION ORAL at 13:10

## 2019-04-03 RX ADMIN — Medication 60 MILLIGRAM(S): at 13:07

## 2019-04-03 RX ADMIN — ATORVASTATIN CALCIUM 20 MILLIGRAM(S): 80 TABLET, FILM COATED ORAL at 23:12

## 2019-04-03 RX ADMIN — Medication 60 MILLIGRAM(S): at 19:04

## 2019-04-03 RX ADMIN — Medication 650 MILLIGRAM(S): at 19:35

## 2019-04-03 RX ADMIN — Medication 60 MILLIGRAM(S): at 23:13

## 2019-04-03 RX ADMIN — Medication 60 MILLIGRAM(S): at 05:21

## 2019-04-03 RX ADMIN — FINASTERIDE 5 MILLIGRAM(S): 5 TABLET, FILM COATED ORAL at 13:08

## 2019-04-03 RX ADMIN — Medication 81 MILLIGRAM(S): at 13:12

## 2019-04-03 RX ADMIN — Medication 5 MILLIGRAM(S): at 05:21

## 2019-04-03 RX ADMIN — Medication 650 MILLIGRAM(S): at 00:20

## 2019-04-03 RX ADMIN — OXYCODONE HYDROCHLORIDE 5 MILLIGRAM(S): 5 TABLET ORAL at 13:45

## 2019-04-03 RX ADMIN — SEVELAMER CARBONATE 800 MILLIGRAM(S): 2400 POWDER, FOR SUSPENSION ORAL at 19:05

## 2019-04-03 RX ADMIN — OXYCODONE HYDROCHLORIDE 5 MILLIGRAM(S): 5 TABLET ORAL at 13:04

## 2019-04-03 RX ADMIN — Medication 0.2 MILLILITER(S): at 08:52

## 2019-04-03 RX ADMIN — Medication 100 MILLIGRAM(S): at 13:06

## 2019-04-03 RX ADMIN — Medication 650 MILLIGRAM(S): at 13:04

## 2019-04-03 RX ADMIN — Medication 1000 UNIT(S): at 13:07

## 2019-04-03 RX ADMIN — HEPARIN SODIUM 5000 UNIT(S): 5000 INJECTION INTRAVENOUS; SUBCUTANEOUS at 19:08

## 2019-04-03 RX ADMIN — HEPARIN SODIUM 5000 UNIT(S): 5000 INJECTION INTRAVENOUS; SUBCUTANEOUS at 05:22

## 2019-04-03 RX ADMIN — LORATADINE 10 MILLIGRAM(S): 10 TABLET ORAL at 13:08

## 2019-04-03 RX ADMIN — Medication 650 MILLIGRAM(S): at 19:06

## 2019-04-03 RX ADMIN — Medication 1 SPRAY(S): at 13:10

## 2019-04-03 RX ADMIN — ERYTHROPOIETIN 6000 UNIT(S): 10000 INJECTION, SOLUTION INTRAVENOUS; SUBCUTANEOUS at 09:54

## 2019-04-03 RX ADMIN — CHOLESTYRAMINE 4 GRAM(S): 4 POWDER, FOR SUSPENSION ORAL at 13:05

## 2019-04-03 RX ADMIN — DOXERCALCIFEROL 1 MICROGRAM(S): 2.5 CAPSULE ORAL at 09:55

## 2019-04-03 RX ADMIN — Medication 0.5 MILLIGRAM(S): at 20:59

## 2019-04-03 RX ADMIN — Medication 650 MILLIGRAM(S): at 05:22

## 2019-04-04 LAB
ANION GAP SERPL CALC-SCNC: 9 MMOL/L — SIGNIFICANT CHANGE UP (ref 5–17)
BUN SERPL-MCNC: 42 MG/DL — HIGH (ref 7–23)
CALCIUM SERPL-MCNC: 7.4 MG/DL — LOW (ref 8.5–10.1)
CHLORIDE SERPL-SCNC: 102 MMOL/L — SIGNIFICANT CHANGE UP (ref 96–108)
CO2 SERPL-SCNC: 26 MMOL/L — SIGNIFICANT CHANGE UP (ref 22–31)
CREAT SERPL-MCNC: 4.4 MG/DL — HIGH (ref 0.5–1.3)
GLUCOSE SERPL-MCNC: 117 MG/DL — HIGH (ref 70–99)
INR BLD: 1.02 RATIO — SIGNIFICANT CHANGE UP (ref 0.88–1.16)
POTASSIUM SERPL-MCNC: 4.5 MMOL/L — SIGNIFICANT CHANGE UP (ref 3.5–5.3)
POTASSIUM SERPL-SCNC: 4.5 MMOL/L — SIGNIFICANT CHANGE UP (ref 3.5–5.3)
PROTHROM AB SERPL-ACNC: 11.3 SEC — SIGNIFICANT CHANGE UP (ref 10–12.9)
SODIUM SERPL-SCNC: 137 MMOL/L — SIGNIFICANT CHANGE UP (ref 135–145)

## 2019-04-04 RX ADMIN — Medication 650 MILLIGRAM(S): at 14:15

## 2019-04-04 RX ADMIN — Medication 1 SPRAY(S): at 13:52

## 2019-04-04 RX ADMIN — Medication 60 MILLIGRAM(S): at 23:04

## 2019-04-04 RX ADMIN — SEVELAMER CARBONATE 800 MILLIGRAM(S): 2400 POWDER, FOR SUSPENSION ORAL at 13:51

## 2019-04-04 RX ADMIN — Medication 650 MILLIGRAM(S): at 00:00

## 2019-04-04 RX ADMIN — Medication 650 MILLIGRAM(S): at 18:39

## 2019-04-04 RX ADMIN — Medication 650 MILLIGRAM(S): at 13:46

## 2019-04-04 RX ADMIN — Medication 650 MILLIGRAM(S): at 23:02

## 2019-04-04 RX ADMIN — PANTOPRAZOLE SODIUM 40 MILLIGRAM(S): 20 TABLET, DELAYED RELEASE ORAL at 05:31

## 2019-04-04 RX ADMIN — HEPARIN SODIUM 5000 UNIT(S): 5000 INJECTION INTRAVENOUS; SUBCUTANEOUS at 18:41

## 2019-04-04 RX ADMIN — Medication 650 MILLIGRAM(S): at 23:35

## 2019-04-04 RX ADMIN — Medication 60 MILLIGRAM(S): at 18:40

## 2019-04-04 RX ADMIN — Medication 650 MILLIGRAM(S): at 06:20

## 2019-04-04 RX ADMIN — FINASTERIDE 5 MILLIGRAM(S): 5 TABLET, FILM COATED ORAL at 13:49

## 2019-04-04 RX ADMIN — ATORVASTATIN CALCIUM 20 MILLIGRAM(S): 80 TABLET, FILM COATED ORAL at 23:04

## 2019-04-04 RX ADMIN — LORATADINE 10 MILLIGRAM(S): 10 TABLET ORAL at 13:49

## 2019-04-04 RX ADMIN — CHOLESTYRAMINE 4 GRAM(S): 4 POWDER, FOR SUSPENSION ORAL at 09:35

## 2019-04-04 RX ADMIN — SEVELAMER CARBONATE 800 MILLIGRAM(S): 2400 POWDER, FOR SUSPENSION ORAL at 18:41

## 2019-04-04 RX ADMIN — Medication 100 MILLIGRAM(S): at 13:49

## 2019-04-04 RX ADMIN — HEPARIN SODIUM 5000 UNIT(S): 5000 INJECTION INTRAVENOUS; SUBCUTANEOUS at 05:31

## 2019-04-04 RX ADMIN — Medication 5 MILLIGRAM(S): at 05:31

## 2019-04-04 RX ADMIN — Medication 81 MILLIGRAM(S): at 13:52

## 2019-04-04 RX ADMIN — Medication 1000 UNIT(S): at 13:49

## 2019-04-04 RX ADMIN — Medication 650 MILLIGRAM(S): at 05:30

## 2019-04-04 RX ADMIN — WARFARIN SODIUM 5 MILLIGRAM(S): 2.5 TABLET ORAL at 23:04

## 2019-04-04 RX ADMIN — Medication 60 MILLIGRAM(S): at 13:50

## 2019-04-04 RX ADMIN — SEVELAMER CARBONATE 800 MILLIGRAM(S): 2400 POWDER, FOR SUSPENSION ORAL at 09:34

## 2019-04-04 RX ADMIN — Medication 650 MILLIGRAM(S): at 19:00

## 2019-04-04 RX ADMIN — Medication 0.5 MILLIGRAM(S): at 19:35

## 2019-04-04 NOTE — PHYSICAL THERAPY INITIAL EVALUATION ADULT - DID THE PATIENT HAVE SURGERY?
endarterctomy LLE ,angioplasty/yes endarterctomy LLE ,angioplasty R subclavian,inomminate arteries 3/28/19/yes

## 2019-04-04 NOTE — DIETITIAN INITIAL EVALUATION ADULT. - NS FNS WEIGHT USED FOR CALC
Pt c/o generalized abd pain/diarrhea x 2 weeks/NVD x 3 days, seen and dcd here 8 days ago dx w colitis w same symptoms. ideal/60Kg (adjusted for AKA)

## 2019-04-04 NOTE — PHYSICAL THERAPY INITIAL EVALUATION ADULT - PATIENT PROFILE REVIEW, REHAB EVAL
yes/pt unavailable for PT Re-Evaluation 4/3 due to dialysis (started after 8am) returned to room near 1400 and needed to eat ,had severe RUE pain )

## 2019-04-04 NOTE — PHYSICAL THERAPY INITIAL EVALUATION ADULT - GENERAL OBSERVATIONS, REHAB EVAL
resting in bed rolled off backside toward R on specilaty mattress (low air loss type) extensive ecchymosis RUE,dressing in place to antecubital region (HD access),reports "soreness" RUE,denies usually having pain R arm after HD  ,awake,alert,[place],Ox3,R AKA intact ,c/o ++discomfort to diaper area lower buttocks bilat

## 2019-04-04 NOTE — PHYSICAL THERAPY INITIAL EVALUATION ADULT - ASR WT BEARING STATUS EVAL
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

## 2019-04-04 NOTE — DIETITIAN INITIAL EVALUATION ADULT. - NS AS NUTRI INTERV MEALS SNACK
Protein - modified diet/Other (specify)/encourage protein with each meal and high protein snack between meals/General/healthful diet

## 2019-04-04 NOTE — PHYSICAL THERAPY INITIAL EVALUATION ADULT - PLANNED THERAPY INTERVENTIONS, PT EVAL
strengthening/bed mobility training/transfer training
strengthening/wheelchair management/propulsion training/bed mobility training/ROM/balance training/transfer training

## 2019-04-04 NOTE — PHYSICAL THERAPY INITIAL EVALUATION ADULT - MANUAL MUSCLE TESTING RESULTS, REHAB EVAL
WFL >than or =to 4/5 LUE,;3+/5 RUE,4/5 R hip mm ,LLE >3+/5 thruout
except L LE: hip/knee flex 4/5; knee ext 4-/5; ankle PF/DF 3+/5/no strength deficits were identified

## 2019-04-04 NOTE — PHYSICAL THERAPY INITIAL EVALUATION ADULT - ACTIVE RANGE OF MOTION EXAMINATION, REHAB EVAL
bilateral upper extremity Active ROM was WFL (within functional limits)/bilateral  lower extremity Active ROM was WFL (within functional limits)/deficits as listed below/+hamstring tightness LLE noted with passive TKE, due to chair bound status,non-ambulatory; c/o L groin discomfort with ROM LLE limiting hip FLEX to 70 degrees on this encounter
no Active ROM deficits were identified

## 2019-04-04 NOTE — DIETITIAN INITIAL EVALUATION ADULT. - PERTINENT LABORATORY DATA
04-03 Na135 mmol/L Glu 116 mg/dL<H> K+ 4.8 mmol/L Cr  4.95 mg/dL<H> BUN 56 mg/dL<H> Phos 6.7 mg/dL<H> Alb 2.3 g/dL<L> PAB n/a

## 2019-04-04 NOTE — PHYSICAL THERAPY INITIAL EVALUATION ADULT - ADDITIONAL COMMENTS
pt has his own w/c with thick w/c cushion,retractable armrests ,extended hand brakes ,and sliding board
pt @ bed to w/c functional level using slide board

## 2019-04-04 NOTE — DIETITIAN INITIAL EVALUATION ADULT. - PERTINENT MEDS FT
MEDICATIONS  (STANDING):  acetaminophen   Tablet .. 650 milliGRAM(s) Oral every 6 hours  allopurinol 100 milliGRAM(s) Oral daily  aspirin  chewable 81 milliGRAM(s) Oral daily  atorvastatin 20 milliGRAM(s) Oral at bedtime  buDESOnide   0.5 milliGRAM(s) Respule 0.5 milliGRAM(s) Inhalation two times a day  cholecalciferol 1000 Unit(s) Oral daily  cholestyramine Powder (Sugar-Free) 4 Gram(s) Oral daily  diltiazem    Tablet 60 milliGRAM(s) Oral four times a day  doxercalciferol Injectable 1 MICROGram(s) IV Push <User Schedule>  epoetin nelly Injectable 6000 Unit(s) IV Push <User Schedule>  finasteride 5 milliGRAM(s) Oral daily  fluticasone propionate (50 MICROgram(s)/actuation) Nasal Spray - Peds 1 Spray(s) Alternating Nostrils daily  heparin  Injectable 5000 Unit(s) SubCutaneous every 12 hours  lidocaine 1% Injectable 0.2 milliLiter(s) Local Injection <User Schedule>  lidocaine/prilocaine Cream 1 Application(s) Topical <User Schedule>  loratadine 10 milliGRAM(s) Oral daily  methotrexate 10 milliGRAM(s) Oral <User Schedule>  pantoprazole    Tablet 40 milliGRAM(s) Oral before breakfast  predniSONE   Tablet 5 milliGRAM(s) Oral daily  sevelamer hydrochloride Oral Tab/Cap - Peds 800 milliGRAM(s) Oral three times a day with meals  silver sulfADIAZINE 1% Cream 1 Application(s) Topical daily  warfarin 5 milliGRAM(s) Oral daily    MEDICATIONS  (PRN):  HYDROmorphone  Injectable 0.5 milliGRAM(s) IV Push every 4 hours PRN Severe Pain (7 - 10)  nitroglycerin     SubLingual 0.4 milliGRAM(s) SubLingual daily PRN Chest Pain  oxyCODONE    IR 5 milliGRAM(s) Oral every 4 hours PRN Moderate Pain (4 - 6)

## 2019-04-04 NOTE — DIETITIAN INITIAL EVALUATION ADULT. - ENERGY NEEDS
Ht. 67 "     Wt. 72 Kg       25 BMI (not accurate 2/2 Rt AKA)       IBW 60  Kg  (adjusted for Rt AKA)     Pt is at  120  %  IBW

## 2019-04-04 NOTE — PHYSICAL THERAPY INITIAL EVALUATION ADULT - SKIN INTEGRITY
surgical incision/incontinence/incontinent assoc dermatitis(IAD)/dressing C/D/I L groin s/p endarterectomy

## 2019-04-04 NOTE — PHYSICAL THERAPY INITIAL EVALUATION ADULT - PRECAUTIONS/LIMITATIONS, REHAB EVAL
pt wears PRAFO L foot with walking sole; [place]/fall precautions/hearing precautions fall precautions/hearing precautions/pt wears PRAFO L foot with walking sole; [place]/isolation precautions

## 2019-04-04 NOTE — PHYSICAL THERAPY INITIAL EVALUATION ADULT - PHYSICAL ASSIST/NONPHYSICAL ASSIST: SUPINE/SIT, REHAB EVAL
verbal cues/nonverbal cues (demo/gestures)/1 person + 1 person to manage equipment
verbal cues/tactile cues

## 2019-04-05 LAB
ALBUMIN SERPL ELPH-MCNC: 2.3 G/DL — LOW (ref 3.3–5)
ANION GAP SERPL CALC-SCNC: 12 MMOL/L — SIGNIFICANT CHANGE UP (ref 5–17)
BUN SERPL-MCNC: 48 MG/DL — HIGH (ref 7–23)
CALCIUM SERPL-MCNC: 7.3 MG/DL — LOW (ref 8.5–10.1)
CHLORIDE SERPL-SCNC: 102 MMOL/L — SIGNIFICANT CHANGE UP (ref 96–108)
CO2 SERPL-SCNC: 23 MMOL/L — SIGNIFICANT CHANGE UP (ref 22–31)
CREAT SERPL-MCNC: 4.8 MG/DL — HIGH (ref 0.5–1.3)
GLUCOSE SERPL-MCNC: 160 MG/DL — HIGH (ref 70–99)
HCT VFR BLD CALC: 28.9 % — LOW (ref 39–50)
HGB BLD-MCNC: 9.3 G/DL — LOW (ref 13–17)
INR BLD: 1.82 RATIO — HIGH (ref 0.88–1.16)
MCHC RBC-ENTMCNC: 32.2 GM/DL — SIGNIFICANT CHANGE UP (ref 32–36)
MCHC RBC-ENTMCNC: 32.4 PG — SIGNIFICANT CHANGE UP (ref 27–34)
MCV RBC AUTO: 100.7 FL — HIGH (ref 80–100)
NRBC # BLD: 0 /100 WBCS — SIGNIFICANT CHANGE UP (ref 0–0)
PHOSPHATE SERPL-MCNC: 5.7 MG/DL — HIGH (ref 2.5–4.5)
PLATELET # BLD AUTO: 125 K/UL — LOW (ref 150–400)
POTASSIUM SERPL-MCNC: 4.4 MMOL/L — SIGNIFICANT CHANGE UP (ref 3.5–5.3)
POTASSIUM SERPL-SCNC: 4.4 MMOL/L — SIGNIFICANT CHANGE UP (ref 3.5–5.3)
PROTHROM AB SERPL-ACNC: 20.6 SEC — HIGH (ref 10–12.9)
RBC # BLD: 2.87 M/UL — LOW (ref 4.2–5.8)
RBC # FLD: 17.2 % — HIGH (ref 10.3–14.5)
SODIUM SERPL-SCNC: 137 MMOL/L — SIGNIFICANT CHANGE UP (ref 135–145)
WBC # BLD: 2.4 K/UL — LOW (ref 3.8–10.5)
WBC # FLD AUTO: 2.4 K/UL — LOW (ref 3.8–10.5)

## 2019-04-05 RX ORDER — WARFARIN SODIUM 2.5 MG/1
3 TABLET ORAL DAILY
Qty: 0 | Refills: 0 | Status: COMPLETED | OUTPATIENT
Start: 2019-04-05 | End: 2019-04-07

## 2019-04-05 RX ADMIN — Medication 650 MILLIGRAM(S): at 17:37

## 2019-04-05 RX ADMIN — ATORVASTATIN CALCIUM 20 MILLIGRAM(S): 80 TABLET, FILM COATED ORAL at 23:03

## 2019-04-05 RX ADMIN — HEPARIN SODIUM 5000 UNIT(S): 5000 INJECTION INTRAVENOUS; SUBCUTANEOUS at 17:33

## 2019-04-05 RX ADMIN — Medication 60 MILLIGRAM(S): at 05:40

## 2019-04-05 RX ADMIN — FINASTERIDE 5 MILLIGRAM(S): 5 TABLET, FILM COATED ORAL at 17:35

## 2019-04-05 RX ADMIN — Medication 0.5 MILLIGRAM(S): at 20:47

## 2019-04-05 RX ADMIN — Medication 650 MILLIGRAM(S): at 23:03

## 2019-04-05 RX ADMIN — Medication 100 MILLIGRAM(S): at 17:34

## 2019-04-05 RX ADMIN — LORATADINE 10 MILLIGRAM(S): 10 TABLET ORAL at 17:35

## 2019-04-05 RX ADMIN — Medication 650 MILLIGRAM(S): at 23:30

## 2019-04-05 RX ADMIN — ERYTHROPOIETIN 6000 UNIT(S): 10000 INJECTION, SOLUTION INTRAVENOUS; SUBCUTANEOUS at 11:35

## 2019-04-05 RX ADMIN — Medication 0.2 MILLILITER(S): at 11:09

## 2019-04-05 RX ADMIN — WARFARIN SODIUM 3 MILLIGRAM(S): 2.5 TABLET ORAL at 23:03

## 2019-04-05 RX ADMIN — LIDOCAINE AND PRILOCAINE CREAM 1 APPLICATION(S): 25; 25 CREAM TOPICAL at 06:45

## 2019-04-05 RX ADMIN — Medication 81 MILLIGRAM(S): at 17:33

## 2019-04-05 RX ADMIN — Medication 650 MILLIGRAM(S): at 18:41

## 2019-04-05 RX ADMIN — Medication 1000 UNIT(S): at 17:35

## 2019-04-05 RX ADMIN — Medication 1 SPRAY(S): at 18:25

## 2019-04-05 RX ADMIN — PANTOPRAZOLE SODIUM 40 MILLIGRAM(S): 20 TABLET, DELAYED RELEASE ORAL at 05:40

## 2019-04-05 RX ADMIN — HEPARIN SODIUM 5000 UNIT(S): 5000 INJECTION INTRAVENOUS; SUBCUTANEOUS at 05:40

## 2019-04-05 RX ADMIN — Medication 650 MILLIGRAM(S): at 05:38

## 2019-04-05 RX ADMIN — DOXERCALCIFEROL 1 MICROGRAM(S): 2.5 CAPSULE ORAL at 11:35

## 2019-04-05 RX ADMIN — Medication 650 MILLIGRAM(S): at 06:14

## 2019-04-05 RX ADMIN — SEVELAMER CARBONATE 800 MILLIGRAM(S): 2400 POWDER, FOR SUSPENSION ORAL at 17:35

## 2019-04-05 RX ADMIN — CHOLESTYRAMINE 4 GRAM(S): 4 POWDER, FOR SUSPENSION ORAL at 17:35

## 2019-04-05 RX ADMIN — Medication 5 MILLIGRAM(S): at 05:40

## 2019-04-05 RX ADMIN — Medication 60 MILLIGRAM(S): at 17:34

## 2019-04-05 RX ADMIN — Medication 60 MILLIGRAM(S): at 23:03

## 2019-04-05 NOTE — PHARMACOTHERAPY INTERVENTION NOTE - COMMENTS
Performed med rec when pt admitted to [facility]. Reviewed medication list from nursing
Pt on coumadin 5 mg, rec'd for 2 doses, now INR increased from 1.02 to 1.82, recommend to lower dose to 3 mg daily
Left msg for MD re: methotrexate dose pta was 4 tabs of 2.5mg weekly. Order on file is for 1 tab weekly.
Pt restarted on coumadin. Per policy, ordered stat INR and INR for tomorrow
pt on coumadin 5 mg daily, per policy order daily INR

## 2019-04-06 LAB
INR BLD: 3.08 RATIO — HIGH (ref 0.88–1.16)
PROTHROM AB SERPL-ACNC: 35.4 SEC — HIGH (ref 10–12.9)

## 2019-04-06 RX ADMIN — Medication 60 MILLIGRAM(S): at 12:59

## 2019-04-06 RX ADMIN — METHOTREXATE 10 MILLIGRAM(S): 2.5 TABLET ORAL at 12:59

## 2019-04-06 RX ADMIN — SEVELAMER CARBONATE 800 MILLIGRAM(S): 2400 POWDER, FOR SUSPENSION ORAL at 18:54

## 2019-04-06 RX ADMIN — CHOLESTYRAMINE 4 GRAM(S): 4 POWDER, FOR SUSPENSION ORAL at 10:43

## 2019-04-06 RX ADMIN — Medication 1 APPLICATION(S): at 13:02

## 2019-04-06 RX ADMIN — Medication 650 MILLIGRAM(S): at 22:44

## 2019-04-06 RX ADMIN — Medication 650 MILLIGRAM(S): at 18:52

## 2019-04-06 RX ADMIN — Medication 60 MILLIGRAM(S): at 22:44

## 2019-04-06 RX ADMIN — SEVELAMER CARBONATE 800 MILLIGRAM(S): 2400 POWDER, FOR SUSPENSION ORAL at 13:01

## 2019-04-06 RX ADMIN — HEPARIN SODIUM 5000 UNIT(S): 5000 INJECTION INTRAVENOUS; SUBCUTANEOUS at 05:16

## 2019-04-06 RX ADMIN — Medication 100 MILLIGRAM(S): at 12:57

## 2019-04-06 RX ADMIN — Medication 0.5 MILLIGRAM(S): at 08:04

## 2019-04-06 RX ADMIN — Medication 81 MILLIGRAM(S): at 12:59

## 2019-04-06 RX ADMIN — Medication 650 MILLIGRAM(S): at 19:30

## 2019-04-06 RX ADMIN — Medication 0.5 MILLIGRAM(S): at 20:07

## 2019-04-06 RX ADMIN — Medication 1000 UNIT(S): at 12:57

## 2019-04-06 RX ADMIN — ATORVASTATIN CALCIUM 20 MILLIGRAM(S): 80 TABLET, FILM COATED ORAL at 22:44

## 2019-04-06 RX ADMIN — FINASTERIDE 5 MILLIGRAM(S): 5 TABLET, FILM COATED ORAL at 12:59

## 2019-04-06 RX ADMIN — SEVELAMER CARBONATE 800 MILLIGRAM(S): 2400 POWDER, FOR SUSPENSION ORAL at 09:43

## 2019-04-06 RX ADMIN — Medication 60 MILLIGRAM(S): at 18:51

## 2019-04-06 RX ADMIN — Medication 650 MILLIGRAM(S): at 23:35

## 2019-04-06 RX ADMIN — Medication 60 MILLIGRAM(S): at 05:16

## 2019-04-06 RX ADMIN — Medication 650 MILLIGRAM(S): at 12:58

## 2019-04-06 RX ADMIN — Medication 650 MILLIGRAM(S): at 13:50

## 2019-04-06 RX ADMIN — LORATADINE 10 MILLIGRAM(S): 10 TABLET ORAL at 12:58

## 2019-04-06 RX ADMIN — Medication 5 MILLIGRAM(S): at 05:16

## 2019-04-06 RX ADMIN — PANTOPRAZOLE SODIUM 40 MILLIGRAM(S): 20 TABLET, DELAYED RELEASE ORAL at 05:17

## 2019-04-06 RX ADMIN — Medication 1 SPRAY(S): at 13:03

## 2019-04-06 RX ADMIN — Medication 650 MILLIGRAM(S): at 05:15

## 2019-04-06 RX ADMIN — Medication 650 MILLIGRAM(S): at 06:02

## 2019-04-07 LAB
INR BLD: 3.62 RATIO — HIGH (ref 0.88–1.16)
PROTHROM AB SERPL-ACNC: 41.8 SEC — HIGH (ref 10–12.9)

## 2019-04-07 RX ADMIN — Medication 650 MILLIGRAM(S): at 14:15

## 2019-04-07 RX ADMIN — CHOLESTYRAMINE 4 GRAM(S): 4 POWDER, FOR SUSPENSION ORAL at 11:40

## 2019-04-07 RX ADMIN — ATORVASTATIN CALCIUM 20 MILLIGRAM(S): 80 TABLET, FILM COATED ORAL at 23:28

## 2019-04-07 RX ADMIN — Medication 100 MILLIGRAM(S): at 13:53

## 2019-04-07 RX ADMIN — Medication 1 APPLICATION(S): at 13:55

## 2019-04-07 RX ADMIN — Medication 60 MILLIGRAM(S): at 18:52

## 2019-04-07 RX ADMIN — FINASTERIDE 5 MILLIGRAM(S): 5 TABLET, FILM COATED ORAL at 13:53

## 2019-04-07 RX ADMIN — Medication 5 MILLIGRAM(S): at 05:42

## 2019-04-07 RX ADMIN — Medication 0.5 MILLIGRAM(S): at 20:35

## 2019-04-07 RX ADMIN — Medication 1000 UNIT(S): at 13:55

## 2019-04-07 RX ADMIN — Medication 60 MILLIGRAM(S): at 13:55

## 2019-04-07 RX ADMIN — SEVELAMER CARBONATE 800 MILLIGRAM(S): 2400 POWDER, FOR SUSPENSION ORAL at 18:52

## 2019-04-07 RX ADMIN — Medication 650 MILLIGRAM(S): at 13:53

## 2019-04-07 RX ADMIN — Medication 650 MILLIGRAM(S): at 23:28

## 2019-04-07 RX ADMIN — Medication 650 MILLIGRAM(S): at 19:20

## 2019-04-07 RX ADMIN — Medication 0.5 MILLIGRAM(S): at 07:52

## 2019-04-07 RX ADMIN — Medication 60 MILLIGRAM(S): at 05:42

## 2019-04-07 RX ADMIN — Medication 650 MILLIGRAM(S): at 05:41

## 2019-04-07 RX ADMIN — Medication 650 MILLIGRAM(S): at 23:58

## 2019-04-07 RX ADMIN — SEVELAMER CARBONATE 800 MILLIGRAM(S): 2400 POWDER, FOR SUSPENSION ORAL at 10:11

## 2019-04-07 RX ADMIN — Medication 1 SPRAY(S): at 13:52

## 2019-04-07 RX ADMIN — LORATADINE 10 MILLIGRAM(S): 10 TABLET ORAL at 13:53

## 2019-04-07 RX ADMIN — Medication 650 MILLIGRAM(S): at 18:51

## 2019-04-07 RX ADMIN — Medication 81 MILLIGRAM(S): at 13:55

## 2019-04-07 RX ADMIN — PANTOPRAZOLE SODIUM 40 MILLIGRAM(S): 20 TABLET, DELAYED RELEASE ORAL at 05:42

## 2019-04-08 LAB
ALBUMIN SERPL ELPH-MCNC: 2.3 G/DL — LOW (ref 3.3–5)
ANION GAP SERPL CALC-SCNC: 14 MMOL/L — SIGNIFICANT CHANGE UP (ref 5–17)
ANISOCYTOSIS BLD QL: SLIGHT — SIGNIFICANT CHANGE UP
BUN SERPL-MCNC: 54 MG/DL — HIGH (ref 7–23)
CALCIUM SERPL-MCNC: 7.5 MG/DL — LOW (ref 8.5–10.1)
CHLORIDE SERPL-SCNC: 108 MMOL/L — SIGNIFICANT CHANGE UP (ref 96–108)
CO2 SERPL-SCNC: 21 MMOL/L — LOW (ref 22–31)
CREAT SERPL-MCNC: 5.04 MG/DL — HIGH (ref 0.5–1.3)
GLUCOSE SERPL-MCNC: 143 MG/DL — HIGH (ref 70–99)
HCT VFR BLD CALC: 30.1 % — LOW (ref 39–50)
HGB BLD-MCNC: 9.6 G/DL — LOW (ref 13–17)
HYPOCHROMIA BLD QL: SLIGHT — SIGNIFICANT CHANGE UP
INR BLD: 2.95 RATIO — HIGH (ref 0.88–1.16)
MACROCYTES BLD QL: SLIGHT — SIGNIFICANT CHANGE UP
MANUAL SMEAR VERIFICATION: SIGNIFICANT CHANGE UP
MCHC RBC-ENTMCNC: 31.9 GM/DL — LOW (ref 32–36)
MCHC RBC-ENTMCNC: 32.2 PG — SIGNIFICANT CHANGE UP (ref 27–34)
MCV RBC AUTO: 101 FL — HIGH (ref 80–100)
NRBC # BLD: 0 /100 WBCS — SIGNIFICANT CHANGE UP (ref 0–0)
PHOSPHATE SERPL-MCNC: 5.9 MG/DL — HIGH (ref 2.5–4.5)
PLAT MORPH BLD: NORMAL — SIGNIFICANT CHANGE UP
PLATELET # BLD AUTO: 100 K/UL — LOW (ref 150–400)
POIKILOCYTOSIS BLD QL AUTO: SLIGHT — SIGNIFICANT CHANGE UP
POLYCHROMASIA BLD QL SMEAR: SLIGHT — SIGNIFICANT CHANGE UP
POTASSIUM SERPL-MCNC: 3.9 MMOL/L — SIGNIFICANT CHANGE UP (ref 3.5–5.3)
POTASSIUM SERPL-SCNC: 3.9 MMOL/L — SIGNIFICANT CHANGE UP (ref 3.5–5.3)
PROTHROM AB SERPL-ACNC: 33.9 SEC — HIGH (ref 10–12.9)
RBC # BLD: 2.98 M/UL — LOW (ref 4.2–5.8)
RBC # FLD: 17.9 % — HIGH (ref 10.3–14.5)
RBC BLD AUTO: ABNORMAL
SODIUM SERPL-SCNC: 143 MMOL/L — SIGNIFICANT CHANGE UP (ref 135–145)
WBC # BLD: 6.38 K/UL — SIGNIFICANT CHANGE UP (ref 3.8–10.5)
WBC # FLD AUTO: 6.38 K/UL — SIGNIFICANT CHANGE UP (ref 3.8–10.5)

## 2019-04-08 RX ADMIN — Medication 1 APPLICATION(S): at 11:20

## 2019-04-08 RX ADMIN — Medication 100 MILLIGRAM(S): at 18:22

## 2019-04-08 RX ADMIN — LIDOCAINE AND PRILOCAINE CREAM 1 APPLICATION(S): 25; 25 CREAM TOPICAL at 09:25

## 2019-04-08 RX ADMIN — Medication 5 MILLIGRAM(S): at 06:06

## 2019-04-08 RX ADMIN — Medication 650 MILLIGRAM(S): at 06:05

## 2019-04-08 RX ADMIN — Medication 0.2 MILLILITER(S): at 11:45

## 2019-04-08 RX ADMIN — Medication 81 MILLIGRAM(S): at 18:27

## 2019-04-08 RX ADMIN — Medication 0.5 MILLIGRAM(S): at 20:13

## 2019-04-08 RX ADMIN — Medication 1000 UNIT(S): at 18:22

## 2019-04-08 RX ADMIN — Medication 650 MILLIGRAM(S): at 18:24

## 2019-04-08 RX ADMIN — PANTOPRAZOLE SODIUM 40 MILLIGRAM(S): 20 TABLET, DELAYED RELEASE ORAL at 06:05

## 2019-04-08 RX ADMIN — Medication 60 MILLIGRAM(S): at 23:03

## 2019-04-08 RX ADMIN — Medication 1 SPRAY(S): at 18:21

## 2019-04-08 RX ADMIN — Medication 650 MILLIGRAM(S): at 23:02

## 2019-04-08 RX ADMIN — SEVELAMER CARBONATE 800 MILLIGRAM(S): 2400 POWDER, FOR SUSPENSION ORAL at 18:21

## 2019-04-08 RX ADMIN — ERYTHROPOIETIN 6000 UNIT(S): 10000 INJECTION, SOLUTION INTRAVENOUS; SUBCUTANEOUS at 14:16

## 2019-04-08 RX ADMIN — Medication 0.5 MILLIGRAM(S): at 08:11

## 2019-04-08 RX ADMIN — Medication 650 MILLIGRAM(S): at 19:00

## 2019-04-08 RX ADMIN — Medication 650 MILLIGRAM(S): at 06:35

## 2019-04-08 RX ADMIN — ATORVASTATIN CALCIUM 20 MILLIGRAM(S): 80 TABLET, FILM COATED ORAL at 22:50

## 2019-04-08 RX ADMIN — LORATADINE 10 MILLIGRAM(S): 10 TABLET ORAL at 18:22

## 2019-04-08 RX ADMIN — SEVELAMER CARBONATE 800 MILLIGRAM(S): 2400 POWDER, FOR SUSPENSION ORAL at 09:25

## 2019-04-08 RX ADMIN — Medication 60 MILLIGRAM(S): at 18:22

## 2019-04-08 RX ADMIN — FINASTERIDE 5 MILLIGRAM(S): 5 TABLET, FILM COATED ORAL at 18:23

## 2019-04-08 RX ADMIN — Medication 650 MILLIGRAM(S): at 23:03

## 2019-04-08 RX ADMIN — DOXERCALCIFEROL 1 MICROGRAM(S): 2.5 CAPSULE ORAL at 14:15

## 2019-04-08 NOTE — PROGRESS NOTE ADULT - REASON FOR ADMISSION
CRF/L leg wound
PAD
ulcer L leg
angiogram

## 2019-04-09 LAB
ANION GAP SERPL CALC-SCNC: 7 MMOL/L — SIGNIFICANT CHANGE UP (ref 5–17)
BUN SERPL-MCNC: 43 MG/DL — HIGH (ref 7–23)
CALCIUM SERPL-MCNC: 7.4 MG/DL — LOW (ref 8.5–10.1)
CHLORIDE SERPL-SCNC: 111 MMOL/L — HIGH (ref 96–108)
CO2 SERPL-SCNC: 24 MMOL/L — SIGNIFICANT CHANGE UP (ref 22–31)
CREAT SERPL-MCNC: 3.84 MG/DL — HIGH (ref 0.5–1.3)
GLUCOSE SERPL-MCNC: 133 MG/DL — HIGH (ref 70–99)
POTASSIUM SERPL-MCNC: 3.7 MMOL/L — SIGNIFICANT CHANGE UP (ref 3.5–5.3)
POTASSIUM SERPL-SCNC: 3.7 MMOL/L — SIGNIFICANT CHANGE UP (ref 3.5–5.3)
SODIUM SERPL-SCNC: 142 MMOL/L — SIGNIFICANT CHANGE UP (ref 135–145)

## 2019-04-09 RX ADMIN — Medication 60 MILLIGRAM(S): at 17:38

## 2019-04-09 RX ADMIN — CHOLESTYRAMINE 4 GRAM(S): 4 POWDER, FOR SUSPENSION ORAL at 08:37

## 2019-04-09 RX ADMIN — Medication 650 MILLIGRAM(S): at 05:19

## 2019-04-09 RX ADMIN — Medication 60 MILLIGRAM(S): at 23:34

## 2019-04-09 RX ADMIN — Medication 1 APPLICATION(S): at 13:13

## 2019-04-09 RX ADMIN — Medication 100 MILLIGRAM(S): at 13:08

## 2019-04-09 RX ADMIN — Medication 5 MILLIGRAM(S): at 05:20

## 2019-04-09 RX ADMIN — Medication 650 MILLIGRAM(S): at 18:30

## 2019-04-09 RX ADMIN — Medication 1000 UNIT(S): at 13:08

## 2019-04-09 RX ADMIN — Medication 60 MILLIGRAM(S): at 05:20

## 2019-04-09 RX ADMIN — Medication 650 MILLIGRAM(S): at 17:39

## 2019-04-09 RX ADMIN — SEVELAMER CARBONATE 800 MILLIGRAM(S): 2400 POWDER, FOR SUSPENSION ORAL at 17:38

## 2019-04-09 RX ADMIN — SEVELAMER CARBONATE 800 MILLIGRAM(S): 2400 POWDER, FOR SUSPENSION ORAL at 09:05

## 2019-04-09 RX ADMIN — Medication 650 MILLIGRAM(S): at 14:10

## 2019-04-09 RX ADMIN — FINASTERIDE 5 MILLIGRAM(S): 5 TABLET, FILM COATED ORAL at 13:08

## 2019-04-09 RX ADMIN — ATORVASTATIN CALCIUM 20 MILLIGRAM(S): 80 TABLET, FILM COATED ORAL at 21:35

## 2019-04-09 RX ADMIN — Medication 0.5 MILLIGRAM(S): at 08:05

## 2019-04-09 RX ADMIN — Medication 650 MILLIGRAM(S): at 13:10

## 2019-04-09 RX ADMIN — SEVELAMER CARBONATE 800 MILLIGRAM(S): 2400 POWDER, FOR SUSPENSION ORAL at 13:08

## 2019-04-09 RX ADMIN — Medication 650 MILLIGRAM(S): at 05:28

## 2019-04-09 RX ADMIN — PANTOPRAZOLE SODIUM 40 MILLIGRAM(S): 20 TABLET, DELAYED RELEASE ORAL at 05:20

## 2019-04-09 RX ADMIN — Medication 1 SPRAY(S): at 13:12

## 2019-04-09 RX ADMIN — LORATADINE 10 MILLIGRAM(S): 10 TABLET ORAL at 13:08

## 2019-04-09 RX ADMIN — Medication 81 MILLIGRAM(S): at 13:07

## 2019-04-09 RX ADMIN — Medication 650 MILLIGRAM(S): at 23:35

## 2019-04-09 RX ADMIN — Medication 0.5 MILLIGRAM(S): at 19:52

## 2019-04-09 RX ADMIN — Medication 60 MILLIGRAM(S): at 13:08

## 2019-04-09 NOTE — CHART NOTE - NSCHARTNOTEFT_GEN_A_CORE
Assessment:   *pt s/p femoral endarterectomy.  *pt with good PO intake, tolerating PO diet well without N/V.  Based on report, pt likely meeting estimated nutr needs.  *wt fluctuations likely 2/2 fluid wt gain/loss.  will continue to monitor.  *labs noted; hyperphosphatemia; pt on phosphate binders.    Recommendations:  1) encourage adequate protein/calorie intake  2) daily wt checks  3) monitor lytes/mins; adjust meds prn      Diet Prescription: Diet, Renal Restrictions:   For patients receiving Renal Replacement - No Protein Restr, No Conc K, No Conc Phos, Low Sodium  DASH/TLC {Sodium & Cholesterol Restricted} (DASH)  1000mL Fluid Restriction ([id number]) (04-02-19 @ 08:22)      Wt Hx:  73.1Kg (post HD wt on 4/8)  71.5Kg (4/6)  70.5Kg (post HD 4/5)  78Kg (post HD, 4/3)  Weight (kg): [id number] (03-28-19 @ 07:06)        Estimated Needs:   [x] no change since previous assessment  Estimated Energy Needs (25-30 calories/kg):  · Weight  (lbs) 132.2 lb  · Weight (kg) 60 kg  · From (25cal/kg) 1500 To (30cal/kg) 1800 Kcal    Other Calculation:  · Other Calculation  Ht. 67 "     Wt. 72 Kg       25 BMI (not accurate 2/2 Rt AKA)       IBW 60  Kg  (adjusted for Rt AKA)     Pt is at  120  %  IBW    Estimated Protein Needs (1.4-1.8 g/kg):  · Weight  (lbs) 132.2  · Weight (kg) 60 kg  · From (1.4 g/kg) 84 To (1.8 g/kg) 108 g protein    Nutrition Diagnostic #1:  · Nutrition Diagnostic Terminology #1: Nutrient  · Nutrient: Increased nutrient needs (specify)  · Etiology: increased demand for protein due to dialysis  · Signs/Symptoms: Dx ESRD on HD  · Nutrition Intervention: Meals and Snack  · Meals and Snacks: General/healthful diet; Protein - modified diet; Other (specify); encourage protein with each meal and high protein snack between meals  · Goal/Expected Outcome: pt meet >80% of estimated nutr needs      Nutrition Diagnosis is [x] ongoing  [ ] resolved [ ] not applicable     New Nutrition Diagnosis: [x] not applicable     Pertinent Medications: MEDICATIONS  (STANDING):  acetaminophen   Tablet .. 650 milliGRAM(s) Oral every 6 hours  allopurinol 100 milliGRAM(s) Oral daily  aspirin  chewable 81 milliGRAM(s) Oral daily  atorvastatin 20 milliGRAM(s) Oral at bedtime  buDESOnide   0.5 milliGRAM(s) Respule 0.5 milliGRAM(s) Inhalation two times a day  cholecalciferol 1000 Unit(s) Oral daily  cholestyramine Powder (Sugar-Free) 4 Gram(s) Oral daily  diltiazem    Tablet 60 milliGRAM(s) Oral four times a day  doxercalciferol Injectable 1 MICROGram(s) IV Push <User Schedule>  epoetin nelly Injectable 6000 Unit(s) IV Push <User Schedule>  finasteride 5 milliGRAM(s) Oral daily  fluticasone propionate (50 MICROgram(s)/actuation) Nasal Spray - Peds 1 Spray(s) Alternating Nostrils daily  lidocaine 1% Injectable 0.2 milliLiter(s) Local Injection <User Schedule>  lidocaine/prilocaine Cream 1 Application(s) Topical <User Schedule>  loratadine 10 milliGRAM(s) Oral daily  methotrexate 10 milliGRAM(s) Oral <User Schedule>  pantoprazole    Tablet 40 milliGRAM(s) Oral before breakfast  predniSONE   Tablet 5 milliGRAM(s) Oral daily  sevelamer hydrochloride Oral Tab/Cap - Peds 800 milliGRAM(s) Oral three times a day with meals  silver sulfADIAZINE 1% Cream 1 Application(s) Topical daily    MEDICATIONS  (PRN):  nitroglycerin     SubLingual 0.4 milliGRAM(s) SubLingual daily PRN Chest Pain    Pertinent Labs: 04-08 Na143 mmol/L Glu 143 mg/dL<H> K+ 3.9 mmol/L Cr  5.04 mg/dL<H> BUN 54 mg/dL<H> 04-08 Phos 5.9 mg/dL<H> 04-08 Alb 2.3 g/dL<L>       Skin: omar score = 15  Lt heel stage 1 PU    Monitoring and Evaluation:   [x] PO intake/Nutr support infusion [ x ] Tolerance to Nutr [ x ] weights [ x ] labs[ x ] follow up per protocol  [ ] other:

## 2019-04-10 LAB
ALBUMIN SERPL ELPH-MCNC: 2.2 G/DL — LOW (ref 3.3–5)
ANION GAP SERPL CALC-SCNC: 13 MMOL/L — SIGNIFICANT CHANGE UP (ref 5–17)
ANISOCYTOSIS BLD QL: SIGNIFICANT CHANGE UP
APTT BLD: 35.6 SEC — SIGNIFICANT CHANGE UP (ref 27.5–36.3)
BASOPHILS # BLD AUTO: 0.06 K/UL — SIGNIFICANT CHANGE UP (ref 0–0.2)
BASOPHILS NFR BLD AUTO: 1 % — SIGNIFICANT CHANGE UP (ref 0–2)
BUN SERPL-MCNC: 48 MG/DL — HIGH (ref 7–23)
CALCIUM SERPL-MCNC: 7.6 MG/DL — LOW (ref 8.5–10.1)
CHLORIDE SERPL-SCNC: 111 MMOL/L — HIGH (ref 96–108)
CO2 SERPL-SCNC: 20 MMOL/L — LOW (ref 22–31)
CREAT SERPL-MCNC: 4.09 MG/DL — HIGH (ref 0.5–1.3)
EOSINOPHIL # BLD AUTO: 0.24 K/UL — SIGNIFICANT CHANGE UP (ref 0–0.5)
EOSINOPHIL NFR BLD AUTO: 4 % — SIGNIFICANT CHANGE UP (ref 0–6)
GLUCOSE SERPL-MCNC: 131 MG/DL — HIGH (ref 70–99)
HCT VFR BLD CALC: 28.6 % — LOW (ref 39–50)
HGB BLD-MCNC: 8.9 G/DL — LOW (ref 13–17)
INR BLD: 1.47 RATIO — HIGH (ref 0.88–1.16)
LYMPHOCYTES # BLD AUTO: 0.6 K/UL — LOW (ref 1–3.3)
LYMPHOCYTES # BLD AUTO: 10 % — LOW (ref 13–44)
MACROCYTES BLD QL: SLIGHT — SIGNIFICANT CHANGE UP
MANUAL SMEAR VERIFICATION: SIGNIFICANT CHANGE UP
MCHC RBC-ENTMCNC: 31.1 GM/DL — LOW (ref 32–36)
MCHC RBC-ENTMCNC: 31.8 PG — SIGNIFICANT CHANGE UP (ref 27–34)
MCV RBC AUTO: 102.1 FL — HIGH (ref 80–100)
MONOCYTES # BLD AUTO: 0.12 K/UL — SIGNIFICANT CHANGE UP (ref 0–0.9)
MONOCYTES NFR BLD AUTO: 2 % — SIGNIFICANT CHANGE UP (ref 2–14)
NEUTROPHILS # BLD AUTO: 4.97 K/UL — SIGNIFICANT CHANGE UP (ref 1.8–7.4)
NEUTROPHILS NFR BLD AUTO: 83 % — HIGH (ref 43–77)
NRBC # BLD: 0 /100 — SIGNIFICANT CHANGE UP (ref 0–0)
NRBC # BLD: SIGNIFICANT CHANGE UP /100 WBCS (ref 0–0)
OVALOCYTES BLD QL SMEAR: SLIGHT — SIGNIFICANT CHANGE UP
PHOSPHATE SERPL-MCNC: 4.7 MG/DL — HIGH (ref 2.5–4.5)
PLAT MORPH BLD: NORMAL — SIGNIFICANT CHANGE UP
PLATELET # BLD AUTO: 99 K/UL — LOW (ref 150–400)
POIKILOCYTOSIS BLD QL AUTO: SLIGHT — SIGNIFICANT CHANGE UP
POTASSIUM SERPL-MCNC: 3.8 MMOL/L — SIGNIFICANT CHANGE UP (ref 3.5–5.3)
POTASSIUM SERPL-SCNC: 3.8 MMOL/L — SIGNIFICANT CHANGE UP (ref 3.5–5.3)
PROTHROM AB SERPL-ACNC: 16.5 SEC — HIGH (ref 10–12.9)
RBC # BLD: 2.8 M/UL — LOW (ref 4.2–5.8)
RBC # FLD: 17.9 % — HIGH (ref 10.3–14.5)
RBC BLD AUTO: ABNORMAL
SODIUM SERPL-SCNC: 144 MMOL/L — SIGNIFICANT CHANGE UP (ref 135–145)
WBC # BLD: 5.99 K/UL — SIGNIFICANT CHANGE UP (ref 3.8–10.5)
WBC # FLD AUTO: 5.99 K/UL — SIGNIFICANT CHANGE UP (ref 3.8–10.5)

## 2019-04-10 RX ORDER — SEVELAMER CARBONATE 2400 MG/1
800 POWDER, FOR SUSPENSION ORAL
Qty: 0 | Refills: 0 | Status: DISCONTINUED | OUTPATIENT
Start: 2019-04-10 | End: 2019-04-11

## 2019-04-10 RX ORDER — WARFARIN SODIUM 2.5 MG/1
3 TABLET ORAL ONCE
Qty: 0 | Refills: 0 | Status: COMPLETED | OUTPATIENT
Start: 2019-04-10 | End: 2019-04-10

## 2019-04-10 RX ADMIN — Medication 100 MILLIGRAM(S): at 13:10

## 2019-04-10 RX ADMIN — LIDOCAINE AND PRILOCAINE CREAM 1 APPLICATION(S): 25; 25 CREAM TOPICAL at 06:32

## 2019-04-10 RX ADMIN — Medication 1000 UNIT(S): at 13:17

## 2019-04-10 RX ADMIN — Medication 650 MILLIGRAM(S): at 18:22

## 2019-04-10 RX ADMIN — WARFARIN SODIUM 3 MILLIGRAM(S): 2.5 TABLET ORAL at 22:00

## 2019-04-10 RX ADMIN — Medication 81 MILLIGRAM(S): at 13:17

## 2019-04-10 RX ADMIN — Medication 650 MILLIGRAM(S): at 06:31

## 2019-04-10 RX ADMIN — LORATADINE 10 MILLIGRAM(S): 10 TABLET ORAL at 14:50

## 2019-04-10 RX ADMIN — Medication 650 MILLIGRAM(S): at 13:26

## 2019-04-10 RX ADMIN — Medication 60 MILLIGRAM(S): at 18:22

## 2019-04-10 RX ADMIN — PANTOPRAZOLE SODIUM 40 MILLIGRAM(S): 20 TABLET, DELAYED RELEASE ORAL at 06:31

## 2019-04-10 RX ADMIN — Medication 650 MILLIGRAM(S): at 13:21

## 2019-04-10 RX ADMIN — SEVELAMER CARBONATE 800 MILLIGRAM(S): 2400 POWDER, FOR SUSPENSION ORAL at 18:22

## 2019-04-10 RX ADMIN — Medication 1 SPRAY(S): at 13:26

## 2019-04-10 RX ADMIN — Medication 650 MILLIGRAM(S): at 23:36

## 2019-04-10 RX ADMIN — ATORVASTATIN CALCIUM 20 MILLIGRAM(S): 80 TABLET, FILM COATED ORAL at 22:00

## 2019-04-10 RX ADMIN — Medication 5 MILLIGRAM(S): at 06:31

## 2019-04-10 RX ADMIN — Medication 60 MILLIGRAM(S): at 06:31

## 2019-04-10 RX ADMIN — FINASTERIDE 5 MILLIGRAM(S): 5 TABLET, FILM COATED ORAL at 13:17

## 2019-04-10 RX ADMIN — Medication 60 MILLIGRAM(S): at 13:10

## 2019-04-10 RX ADMIN — Medication 0.2 MILLILITER(S): at 13:28

## 2019-04-10 RX ADMIN — Medication 0.5 MILLIGRAM(S): at 20:25

## 2019-04-10 RX ADMIN — SEVELAMER CARBONATE 800 MILLIGRAM(S): 2400 POWDER, FOR SUSPENSION ORAL at 13:10

## 2019-04-10 RX ADMIN — DOXERCALCIFEROL 1 MICROGRAM(S): 2.5 CAPSULE ORAL at 09:06

## 2019-04-10 RX ADMIN — Medication 60 MILLIGRAM(S): at 23:36

## 2019-04-10 RX ADMIN — ERYTHROPOIETIN 6000 UNIT(S): 10000 INJECTION, SOLUTION INTRAVENOUS; SUBCUTANEOUS at 09:07

## 2019-04-10 RX ADMIN — CHOLESTYRAMINE 4 GRAM(S): 4 POWDER, FOR SUSPENSION ORAL at 13:10

## 2019-04-11 ENCOUNTER — TRANSCRIPTION ENCOUNTER (OUTPATIENT)
Age: 84
End: 2019-04-11

## 2019-04-11 VITALS
HEART RATE: 67 BPM | SYSTOLIC BLOOD PRESSURE: 120 MMHG | OXYGEN SATURATION: 96 % | RESPIRATION RATE: 16 BRPM | DIASTOLIC BLOOD PRESSURE: 41 MMHG | TEMPERATURE: 98 F

## 2019-04-11 RX ORDER — FINASTERIDE 5 MG/1
1 TABLET, FILM COATED ORAL
Qty: 0 | Refills: 0 | COMMUNITY
Start: 2019-04-11

## 2019-04-11 RX ORDER — DOXERCALCIFEROL 2.5 UG/1
1 CAPSULE ORAL
Qty: 0 | Refills: 0 | COMMUNITY
Start: 2019-04-11

## 2019-04-11 RX ORDER — ERYTHROPOIETIN 10000 [IU]/ML
0 INJECTION, SOLUTION INTRAVENOUS; SUBCUTANEOUS
Qty: 0 | Refills: 0 | COMMUNITY
Start: 2019-04-11

## 2019-04-11 RX ADMIN — PANTOPRAZOLE SODIUM 40 MILLIGRAM(S): 20 TABLET, DELAYED RELEASE ORAL at 05:18

## 2019-04-11 RX ADMIN — FINASTERIDE 5 MILLIGRAM(S): 5 TABLET, FILM COATED ORAL at 12:12

## 2019-04-11 RX ADMIN — SEVELAMER CARBONATE 800 MILLIGRAM(S): 2400 POWDER, FOR SUSPENSION ORAL at 15:14

## 2019-04-11 RX ADMIN — Medication 60 MILLIGRAM(S): at 05:18

## 2019-04-11 RX ADMIN — Medication 81 MILLIGRAM(S): at 12:09

## 2019-04-11 RX ADMIN — CHOLESTYRAMINE 4 GRAM(S): 4 POWDER, FOR SUSPENSION ORAL at 11:33

## 2019-04-11 RX ADMIN — Medication 1000 UNIT(S): at 12:10

## 2019-04-11 RX ADMIN — Medication 650 MILLIGRAM(S): at 12:14

## 2019-04-11 RX ADMIN — Medication 650 MILLIGRAM(S): at 05:18

## 2019-04-11 RX ADMIN — SEVELAMER CARBONATE 800 MILLIGRAM(S): 2400 POWDER, FOR SUSPENSION ORAL at 11:00

## 2019-04-11 RX ADMIN — Medication 100 MILLIGRAM(S): at 12:09

## 2019-04-11 RX ADMIN — Medication 1 SPRAY(S): at 12:15

## 2019-04-11 RX ADMIN — LORATADINE 10 MILLIGRAM(S): 10 TABLET ORAL at 12:12

## 2019-04-11 RX ADMIN — Medication 60 MILLIGRAM(S): at 12:13

## 2019-04-11 RX ADMIN — Medication 5 MILLIGRAM(S): at 05:17

## 2019-04-11 NOTE — PROGRESS NOTE ADULT - PROVIDER SPECIALTY LIST ADULT
Internal Medicine
Nephrology
Surgery
Vascular Surgery
Nephrology
Nephrology
Internal Medicine

## 2019-04-11 NOTE — CHART NOTE - NSCHARTNOTEFT_GEN_A_CORE
Assisted Dr. Clement to remove Right IJ Tesio Catheter for sepsis.    Procedure: Hemodialysis Catheter Removal  Indication: Sepsis  Site: Right IJ  After verifying correct patient, procedure, site, positioning, and special equipment obtained. The patient was placed in a 45 degree position The patient’s right  neck was prepped and draped in sterile fashion. 1% Lidocaine was used to anesthetize the surrounding of cath skin area. Then  make an incision over the cuff area to release fibrotic tissue. Then removed the catheter smoothly and found intact tip. 10 minute digital pressure applied proximal to cut down area for hemostasis. Applied DSD and surgicel with compressed gauze for hemostasis.  Pt tolerated the procedure well. Estimated Blood Loss: Minimal

## 2019-04-11 NOTE — DISCHARGE NOTE NURSING/CASE MANAGEMENT/SOCIAL WORK - NSDCVIVACCINE_GEN_ALL_CORE_FT
Influenza , 2016/9/29 13:19 , Cheryl Kwok (RN)  Influenza , 2017/9/7 10:04 , Jodi Khan (RN)  Influenza , 2018/10/16 15:43 , Sun Christiansen (RN)

## 2019-04-11 NOTE — DISCHARGE NOTE PROVIDER - CARE PROVIDER_API CALL
Jason Clement)  Vascular Surgery  [address], [place] [address]  Phone: [phone]  Fax: [phone]  Follow Up Time:

## 2019-04-11 NOTE — PROGRESS NOTE ADULT - ASSESSMENT
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
83 yo man with ESRD on maintenance HD admitted for Angioplasty of Right Subclavian and Innominate vein stenoses to improve AVF maturation.  --ESRD :  Continue TIW HD   --AVF : d/w Dr. Mccracken.  Start cannulation tomorrow.  --PVD : for evaluation on 4/1.  --Fluid/Electrolytes stable.    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  NO indication for urgent HD today.  --AVF : cannulated x2 without difficulty.  Monitor function.  S/p Right Subclavian and Innominate vein angioplasty.  RUE edema much improved.  --PAD : Post Left Ileofemoral Endarterectomy.  Improved perfusion to left foot noted.    --Anemia of ESRD : post acute blood loss and transfusion.  Monitor.    4/3 SY  --ESRD : Tolerating tx well.   UF at 2 kg goal.  --AVF : cannulated x3.  Plan 16 G needles for next tx.  --PAD :Post Left Ileofemoral Endarterectomy : monitor perfusion and wound.  --Anemia os ESRD : post acute blood loss and transfusion : continue to follow.  --PT today.    4/4 SY  --ESRD : Continue TIW HD.  --AVF : Plan to cannulate with 16 G tomorrow.  Removal of Permcath prior to d/c if AVF continues to function well.  --PVD : Post Left Ileofemoral Endarterectomy : Perfusion improved.  --Anemia : Decreased HGB  : follow up repeat in am with HD.  --D/c planning.    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
85 yo man with ESRD on maintenance HD admitted for Angioplasty of Right Subclavian and Innominate vein stenoses to improve AVF maturation.  --ESRD :  Continue TIW HD   --AVF : d/w Dr. Mccracken.  Start cannulation tomorrow.  --PVD : for evaluation on 4/1.  --Fluid/Electrolytes stable.    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  NO indication for urgent HD today.  --AVF : cannulated x2 without difficulty.  Monitor function.  S/p Right Subclavian and Innominate vein angioplasty.  RUE edema much improved.  --PAD : Post Left Ileofemoral Endarterectomy.  Improved perfusion to left foot noted.    --Anemia of ESRD : post acute blood loss and transfusion.  Monitor.
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
83 yo man with ESRD on maintenance HD admitted for Angioplasty of Right Subclavian and Innominate vein stenoses to improve AVF maturation - s/p angioplasty    ESRD     Continue TIW HD  - next HD Tuesday     Fluid electrolytes stable    AVF    s/p successful cannulation Friday     PVD   LE angio on 4/1 Dr Clement      Anemia  monitor h/h  EPO resumed     Dr Sanabria to resume care 4/1
83 yo man with ESRD on maintenance HD admitted for Angioplasty of Right Subclavian and Innominate vein stenoses to improve AVF maturation.  --ESRD :  Continue TIW HD   --AVF : d/w Dr. Mccracken.  Start cannulation tomorrow.  --PVD : for evaluation on 4/1.  --Fluid/Electrolytes stable.    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
83 yo man with ESRD on maintenance HD admitted for Angioplasty of Right Subclavian and Innominate vein stenoses to improve AVF maturation.  --ESRD :  Continue TIW HD   --AVF : d/w Dr. Mccracken.  Start cannulation tomorrow.  --PVD : for evaluation on 4/1.  --Fluid/Electrolytes stable.    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  NO indication for urgent HD today.  --AVF : cannulated x2 without difficulty.  Monitor function.  S/p Right Subclavian and Innominate vein angioplasty.  RUE edema much improved.  --PAD : Post Left Ileofemoral Endarterectomy.  Improved perfusion to left foot noted.    --Anemia of ESRD : post acute blood loss and transfusion.  Monitor.    4/3 SY  --ESRD : Tolerating tx well.   UF at 2 kg goal.  --AVF : cannulated x3.  Plan 16 G needles for next tx.  --PAD :Post Left Ileofemoral Endarterectomy : monitor perfusion and wound.  --Anemia os ESRD : post acute blood loss and transfusion : continue to follow.  --PT today.
83 yo man with ESRD on maintenance HD admitted for Angioplasty of Right Subclavian and Innominate vein stenoses to improve AVF maturation.  --ESRD :  Continue TIW HD   --AVF : d/w Dr. Mccracken.  Start cannulation tomorrow.  --PVD : for evaluation on 4/1.  --Fluid/Electrolytes stable.    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  NO indication for urgent HD today.  --AVF : cannulated x2 without difficulty.  Monitor function.  S/p Right Subclavian and Innominate vein angioplasty.  RUE edema much improved.  --PAD : Post Left Ileofemoral Endarterectomy.  Improved perfusion to left foot noted.    --Anemia of ESRD : post acute blood loss and transfusion.  Monitor.    4/3 SY  --ESRD : Tolerating tx well.   UF at 2 kg goal.  --AVF : cannulated x3.  Plan 16 G needles for next tx.  --PAD :Post Left Ileofemoral Endarterectomy : monitor perfusion and wound.  --Anemia os ESRD : post acute blood loss and transfusion : continue to follow.  --PT today.    4/4 SY  --ESRD : Continue TIW HD.  --AVF : Plan to cannulate with 16 G tomorrow.  Removal of Permcath prior to d/c if AVF continues to function well.  --PVD : Post Left Ileofemoral Endarterectomy : Perfusion improved.  --Anemia : Decreased HGB  : follow up repeat in am with HD.  --D/c planning.
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
85 yo man with ESRD on maintenance HD admitted for Angioplasty of Right Subclavian and Innominate vein stenoses to improve AVF maturation.  --ESRD :  Continue TIW HD   --AVF : d/w Dr. Mccracken.  Start cannulation tomorrow.  --PVD : for evaluation on 4/1.  --Fluid/Electrolytes stable.    3/29  s/p angioplasty of R subclavian and innominate vein  AVF accessed w/o problem  L foot evaluation by Dr Clement 4/1  Small drop in HGB post op,  will resume EPO  monitor h/h
85 yo man with ESRD on maintenance HD admitted for Angioplasty of Right Subclavian and Innominate vein stenoses to improve AVF maturation.  --ESRD :  Continue TIW HD   --AVF : d/w Dr. Mccracken.  Start cannulation tomorrow.  --PVD : for evaluation on 4/1.  --Fluid/Electrolytes stable.    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  NO indication for urgent HD today.  --AVF : cannulated x2 without difficulty.  Monitor function.  S/p Right Subclavian and Innominate vein angioplasty.  RUE edema much improved.  --PAD : Post Left Ileofemoral Endarterectomy.  Improved perfusion to left foot noted.    --Anemia of ESRD : post acute blood loss and transfusion.  Monitor.    4/3 SY  --ESRD : Tolerating tx well.   UF at 2 kg goal.  --AVF : cannulated x3.  Plan 16 G needles for next tx.  --PAD :Post Left Ileofemoral Endarterectomy : monitor perfusion and wound.  --Anemia os ESRD : post acute blood loss and transfusion : continue to follow.  --PT today.    4/4 SY  --ESRD : Continue TIW HD.  --AVF : Plan to cannulate with 16 G tomorrow.  Removal of Permcath prior to d/c if AVF continues to function well.  --PVD : Post Left Ileofemoral Endarterectomy : Perfusion improved.  --Anemia : Decreased HGB  : follow up repeat in am with HD.  --D/c planning.    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
85 yo man with ESRD on maintenance HD admitted for Angioplasty of Right Subclavian and Innominate vein stenoses to improve AVF maturation.  --ESRD :  Continue TIW HD   --AVF : d/w Dr. Mccracken.  Start cannulation tomorrow.  --PVD : for evaluation on 4/1.  --Fluid/Electrolytes stable.    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  NO indication for urgent HD today.  --AVF : cannulated x2 without difficulty.  Monitor function.  S/p Right Subclavian and Innominate vein angioplasty.  RUE edema much improved.  --PAD : Post Left Ileofemoral Endarterectomy.  Improved perfusion to left foot noted.    --Anemia of ESRD : post acute blood loss and transfusion.  Monitor.    4/3 SY  --ESRD : Tolerating tx well.   UF at 2 kg goal.  --AVF : cannulated x3.  Plan 16 G needles for next tx.  --PAD :Post Left Ileofemoral Endarterectomy : monitor perfusion and wound.  --Anemia os ESRD : post acute blood loss and transfusion : continue to follow.  --PT today.    4/4 SY  --ESRD : Continue TIW HD.  --AVF : Plan to cannulate with 16 G tomorrow.  Removal of Permcath prior to d/c if AVF continues to function well.  --PVD : Post Left Ileofemoral Endarterectomy : Perfusion improved.  --Anemia : Decreased HGB  : follow up repeat in am with HD.  --D/c planning.    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   Will d/w Dr. Mccracken.  --Anemia : stable  --PVD : improved perfusion post Left Ileofemoral Endarterectomy.  --Fluid/Electrolytes  stable.
85 yo man with ESRD on maintenance HD admitted for Angioplasty of Right Subclavian and Innominate vein stenoses to improve AVF maturation.  --ESRD :  Continue TIW HD   --AVF : d/w Dr. Mccracken.  Start cannulation tomorrow.  --PVD : for evaluation on 4/1.  --Fluid/Electrolytes stable.    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  NO indication for urgent HD today.  --AVF : cannulated x2 without difficulty.  Monitor function.  S/p Right Subclavian and Innominate vein angioplasty.  RUE edema much improved.  --PAD : Post Left Ileofemoral Endarterectomy.  Improved perfusion to left foot noted.    --Anemia of ESRD : post acute blood loss and transfusion.  Monitor.    4/3 SY  --ESRD : Tolerating tx well.   UF at 2 kg goal.  --AVF : cannulated x3.  Plan 16 G needles for next tx.  --PAD :Post Left Ileofemoral Endarterectomy : monitor perfusion and wound.  --Anemia os ESRD : post acute blood loss and transfusion : continue to follow.  --PT today.    4/4 SY  --ESRD : Continue TIW HD.  --AVF : Plan to cannulate with 16 G tomorrow.  Removal of Permcath prior to d/c if AVF continues to function well.  --PVD : Post Left Ileofemoral Endarterectomy : Perfusion improved.  --Anemia : Decreased HGB  : follow up repeat in am with HD.  --D/c planning.    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   Will d/w Dr. Mccracken.  --Anemia : stable  --PVD : improved perfusion post Left Ileofemoral Endarterectomy.  --Fluid/Electrolytes  stable.    4/10 SY  --ESRD : Tolerating HD well today.  --AVF cannulated without difficulty.  Will d/w Dr. Mccracken for removal of permcath.  --PVD : stable.  --Anemia : continue LETICIA,.
85 yo man with ESRD on maintenance HD admitted for Angioplasty of Right Subclavian and Innominate vein stenoses to improve AVF maturation.  --ESRD :  Continue TIW HD   --AVF : d/w Dr. Mccracken.  Start cannulation tomorrow.  --PVD : for evaluation on 4/1.  --Fluid/Electrolytes stable.    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  NO indication for urgent HD today.  --AVF : cannulated x2 without difficulty.  Monitor function.  S/p Right Subclavian and Innominate vein angioplasty.  RUE edema much improved.  --PAD : Post Left Ileofemoral Endarterectomy.  Improved perfusion to left foot noted.    --Anemia of ESRD : post acute blood loss and transfusion.  Monitor.    4/3 SY  --ESRD : Tolerating tx well.   UF at 2 kg goal.  --AVF : cannulated x3.  Plan 16 G needles for next tx.  --PAD :Post Left Ileofemoral Endarterectomy : monitor perfusion and wound.  --Anemia os ESRD : post acute blood loss and transfusion : continue to follow.  --PT today.    4/4 SY  --ESRD : Continue TIW HD.  --AVF : Plan to cannulate with 16 G tomorrow.  Removal of Permcath prior to d/c if AVF continues to function well.  --PVD : Post Left Ileofemoral Endarterectomy : Perfusion improved.  --Anemia : Decreased HGB  : follow up repeat in am with HD.  --D/c planning.    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   Will d/w Dr. Mccracken.  --Anemia : stable  --PVD : improved perfusion post Left Ileofemoral Endarterectomy.  --Fluid/Electrolytes  stable.    4/10 SY  --ESRD : Tolerating HD well today.  --AVF cannulated without difficulty.  Will d/w Dr. Mccracken for removal of permcath.  --PVD : stable.  --Anemia : continue LETICIA,.    4/11 SY  --ESRD : Continue TIW HD  --Continue to monitor AVF function.  Permcath to be removed.  --PVD : stable  --Anemia : stable  From renal standpoint, ok for d/c.
Ass:  S/P iliofemoral Endarterectomy, and angioplasty of R subclavian and innominate vein, h/o CAD, s/p stents PVD, s/p RLE AKA, diastolic HF, ESRD on HD   Plan:  Continue present management.  Pt remains in PACU at this time awaiting transfer to Surgical Stepdown.

## 2019-04-11 NOTE — DISCHARGE NOTE PROVIDER - NSDCCPCAREPLAN_GEN_ALL_CORE_FT
PRINCIPAL DISCHARGE DIAGNOSIS  Diagnosis: PAD (peripheral artery disease)  Assessment and Plan of Treatment:       SECONDARY DISCHARGE DIAGNOSES  Diagnosis: CRF (chronic renal failure)  Assessment and Plan of Treatment:

## 2019-04-11 NOTE — DISCHARGE NOTE NURSING/CASE MANAGEMENT/SOCIAL WORK - NSDCDPATPORTLINK_GEN_ALL_CORE
You can access the [facility] Patient Portal, offered by [facility], by registering with the following website: [email or web address]

## 2019-04-11 NOTE — PROGRESS NOTE ADULT - SUBJECTIVE AND OBJECTIVE BOX
HPI:  83 y/o male with CAD, s/p stentsm PVD, s/p RLE AKA, diastolic HF, ESRD on HD s/p angioplasty of right subclavian vein due to persistent edema of RUE post fistula placement.  Pt also to have femoral endarterectomy on  Monday.  Medicine consult requested for medical management.  3/29/19: No cp, sob, n/v/f/c; feels as though getting a sore on his bottom.  3/30/19: No cp, sob, n/v/f/c; feels ok; offers no complaints  3/31/19: No cp, sob, n/v/f/c; await surgery tomorrow  19: PT TOLERATED surgery well - denies any pain currently, mild groin pain at times; no cp, sob, n/v/f/c  4/3/19: No cp, sob, n/v/f/c; currently on hd; left foot feels fine, some left groin pain  19: no cp, sob, n/v/f/c; left  to touch, left groin feels better    REVIEW OF SYSTEMS:   All 10 systems reviewed in detailed and found to be negative with the exception of what has already been described above      PHYSICAL EXAM:    Vital Signs Last 24 Hrs  T(C): 36.8 (2019 10:22), Max: 37.3 (2019 21:19)  T(F): 98.3 (2019 10:22), Max: 99.2 (2019 21:19)  HR: 77 (2019 15:00) (70 - 125)  BP: 138/45 (2019 14:00) (90/33 - 144/53)  BP(mean): 69 (2019 14:00) (45 - 76)  RR: 14 (2019 15:00) (12 - 34)  SpO2: 96% (2019 15:00) (92% - 98%)                GEN: A and O, NAD, PLEASANT mood stable  HEENT:  NC/AT, EOMI, no oropharyngeal lesions    NECK:   supple    CV:  +S1, +S2, regular, no murmurs or rubs    RESP:   lungs clear to auscultation bilaterally, no wheezing, rales, rhonchi, good air entry bilaterally RIGHT CHEST CATHETER    GI:  abdomen soft, non-tender, non-distended, normal BS,  no abdominal masses, no palpable masses    RECTAL:  not examined    :  not examined    MSK:   normal muscle tone, no atrophy, no rigidity, no contractions    EXT:   no clubbing, no cyanosis, RUE EDEMA - resolving, POS FISTULA no calf pain, swelling or erythema, RIGHT AKA, LLE WITH DRESSING C/D/I, LEFT FOOT DRESSING C/D/I, LEFT GROIN DRESSING C/D/I    VASCULAR:  pulses equal and symmetric in the upper and lower extremities    NEURO:  AAOX3, no focal neurological deficits, follows all commands, able to move extremities spontaneously    SKIN:  no ulcers, lesions or rashes    LABS/IMAGIN.9    3.31  )-----------( 156      ( 2019 08:00 )             30.2     04-    135  |  102  |  56<H>  ----------------------------<  116<H>  4.8   |  22  |  4.95<H>    Ca    7.4<L>      2019 08:00  Phos  6.7     04-    TPro  x   /  Alb  2.3<L>  /  TBili  x   /  DBili  x   /  AST  x   /  ALT  x   /  AlkPhos  x   04-        LIVER FUNCTIONS - ( 2019 08:00 )  Alb: 2.3 g/dL / Pro: x     / ALK PHOS: x     / ALT: x     / AST: x     / GGT: x           PT/INR - ( 2019 06:05 )   PT: 11.3 sec;   INR: 1.02 ratio         MEDICATIONS  (STANDING):  acetaminophen   Tablet .. 650 milliGRAM(s) Oral every 6 hours  allopurinol 100 milliGRAM(s) Oral daily  aspirin  chewable 81 milliGRAM(s) Oral daily  atorvastatin 20 milliGRAM(s) Oral at bedtime  buDESOnide   0.5 milliGRAM(s) Respule 0.5 milliGRAM(s) Inhalation two times a day  cholecalciferol 1000 Unit(s) Oral daily  cholestyramine Powder (Sugar-Free) 4 Gram(s) Oral daily  diltiazem    Tablet 60 milliGRAM(s) Oral four times a day  doxercalciferol Injectable 1 MICROGram(s) IV Push <User Schedule>  epoetin nelly Injectable 6000 Unit(s) IV Push <User Schedule>  finasteride 5 milliGRAM(s) Oral daily  fluticasone propionate (50 MICROgram(s)/actuation) Nasal Spray - Peds 1 Spray(s) Alternating Nostrils daily  heparin  Injectable 5000 Unit(s) SubCutaneous every 12 hours  lidocaine 1% Injectable 0.2 milliLiter(s) Local Injection <User Schedule>  lidocaine/prilocaine Cream 1 Application(s) Topical <User Schedule>  loratadine 10 milliGRAM(s) Oral daily  methotrexate 10 milliGRAM(s) Oral <User Schedule>  pantoprazole    Tablet 40 milliGRAM(s) Oral before breakfast  predniSONE   Tablet 5 milliGRAM(s) Oral daily  sevelamer hydrochloride Oral Tab/Cap - Peds 800 milliGRAM(s) Oral three times a day with meals  silver sulfADIAZINE 1% Cream 1 Application(s) Topical daily  warfarin 5 milliGRAM(s) Oral daily    MEDICATIONS  (PRN):  HYDROmorphone  Injectable 0.5 milliGRAM(s) IV Push every 4 hours PRN Severe Pain (7 - 10)  nitroglycerin     SubLingual 0.4 milliGRAM(s) SubLingual daily PRN Chest Pain  oxyCODONE    IR 5 milliGRAM(s) Oral every 4 hours PRN Moderate Pain (4 - 6)
NEPHROLOGY INTERVAL HPI/OVERNIGHT EVENTS:    Date of Service: 19 @ 09:42  4/ SY  S/p Left Ileofemoral endarterectomy.   Complicated by large blood loss due to calcified vessels leading to PRBC x 5 units and Plts.  Reports feeling comfortable this morning.  Improved Left foot pain.    HPI:  83 yo man with ESRD on maintenance HD for several months.   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  Post elective angioplasty.  Planned for Left LE angiogram due to non-healing foot wound.  Reports feeling well.  No new complaints.    PMHX and PSHX.  1.CAD ( RCA and LAD Bare metal stents in 2016)  2.A FIB on A/C  3.CHF-diastolic  4.HTN  5.Hx of UGIB  6.COPD  7.DM  8.Hx of DVT/IVC filter  9.PVD -post Right AKA   10.Hx of C diff colitis  11.Hx of BPH :S/p Green Light laser.                                                                                                                                                                                                                                                                                                                   MEDICATIONS  (STANDING):  acetaminophen   Tablet .. 650 milliGRAM(s) Oral every 6 hours  allopurinol 100 milliGRAM(s) Oral daily  aspirin  chewable 81 milliGRAM(s) Oral daily  atorvastatin 20 milliGRAM(s) Oral at bedtime  buDESOnide   0.5 milliGRAM(s) Respule 0.5 milliGRAM(s) Inhalation two times a day  cholecalciferol 1000 Unit(s) Oral daily  cholestyramine Powder (Sugar-Free) 4 Gram(s) Oral daily  diltiazem    Tablet 60 milliGRAM(s) Oral four times a day  doxercalciferol Injectable 1 MICROGram(s) IV Push <User Schedule>  epoetin nelly Injectable 6000 Unit(s) IV Push <User Schedule>  finasteride 5 milliGRAM(s) Oral daily  fluticasone propionate (50 MICROgram(s)/actuation) Nasal Spray - Peds 1 Spray(s) Alternating Nostrils daily  heparin  Injectable 5000 Unit(s) SubCutaneous every 12 hours  lidocaine/prilocaine Cream 1 Application(s) Topical <User Schedule>  loratadine 10 milliGRAM(s) Oral daily  methotrexate 10 milliGRAM(s) Oral <User Schedule>  pantoprazole    Tablet 40 milliGRAM(s) Oral before breakfast  predniSONE   Tablet 5 milliGRAM(s) Oral daily  sevelamer carbonate 800 milliGRAM(s) Oral three times a day with meals  sevelamer hydrochloride Oral Tab/Cap - Peds 800 milliGRAM(s) Oral three times a day with meals  silver sulfADIAZINE 1% Cream 1 Application(s) Topical daily    MEDICATIONS  (PRN):  HYDROmorphone  Injectable 0.5 milliGRAM(s) IV Push every 4 hours PRN Severe Pain (7 - 10)  nitroglycerin     SubLingual 0.4 milliGRAM(s) SubLingual daily PRN Chest Pain  oxyCODONE    IR 5 milliGRAM(s) Oral every 4 hours PRN Moderate Pain (4 - 6)    Vital Signs Last 24 Hrs  T(C): 35.8 (2019 09:26), Max: 37.6 (2019 11:24)  T(F): 96.4 (2019 09:26), Max: 99.6 (2019 11:24)  HR: 105 (2019 08:14) (80 - 117)  BP: 119/52 (2019 08:00) (87/36 - 155/46)  BP(mean): 71 (2019 08:00) (54 - 71)  RR: 21 (2019 08:00) (11 - 27)  SpO2: 94% (2019 08:00) (92% - 100%)  Daily     Daily Weight in k (2019 05:00)    - @ 07:01  -  -02 @ 07:00  --------------------------------------------------------  IN: 3158 mL / OUT: 2000 mL / NET: 1158 mL    PHYSICAL EXAM:  Alert and comfortable  GENERAL: No distress  CHEST/LUNG: S1S2 RRR  HEART: S1S2 RRR  ABDOMEN: soft  EXTREMITIES: no edema  SKIN:     LABS:                        11.3   3.95  )-----------( 150      ( 2019 08:38 )             33.7     04-02    141  |  104  |  40<H>  ----------------------------<  85  5.2   |  26  |  4.03<H>    Ca    7.2<L>      2019 08:38  Phos  5.6     04-01    TPro  x   /  Alb  2.6<L>  /  TBili  x   /  DBili  x   /  AST  x   /  ALT  x   /  AlkPhos  x   04-                RADIOLOGY & ADDITIONAL TESTS:
NEPHROLOGY INTERVAL HPI/OVERNIGHT EVENTS:  04-08-19 @ 10:11  doing well, no new c/o  await INR to drift down for CVC to be pulled     MEDICATIONS  (STANDING):  acetaminophen   Tablet .. 650 milliGRAM(s) Oral every 6 hours  allopurinol 100 milliGRAM(s) Oral daily  aspirin  chewable 81 milliGRAM(s) Oral daily  atorvastatin 20 milliGRAM(s) Oral at bedtime  buDESOnide   0.5 milliGRAM(s) Respule 0.5 milliGRAM(s) Inhalation two times a day  cholecalciferol 1000 Unit(s) Oral daily  cholestyramine Powder (Sugar-Free) 4 Gram(s) Oral daily  diltiazem    Tablet 60 milliGRAM(s) Oral four times a day  doxercalciferol Injectable 1 MICROGram(s) IV Push <User Schedule>  epoetin nelly Injectable 6000 Unit(s) IV Push <User Schedule>  finasteride 5 milliGRAM(s) Oral daily  fluticasone propionate (50 MICROgram(s)/actuation) Nasal Spray - Peds 1 Spray(s) Alternating Nostrils daily  lidocaine 1% Injectable 0.2 milliLiter(s) Local Injection <User Schedule>  lidocaine/prilocaine Cream 1 Application(s) Topical <User Schedule>  loratadine 10 milliGRAM(s) Oral daily  methotrexate 10 milliGRAM(s) Oral <User Schedule>  pantoprazole    Tablet 40 milliGRAM(s) Oral before breakfast  predniSONE   Tablet 5 milliGRAM(s) Oral daily  sevelamer hydrochloride Oral Tab/Cap - Peds 800 milliGRAM(s) Oral three times a day with meals  silver sulfADIAZINE 1% Cream 1 Application(s) Topical daily    MEDICATIONS  (PRN):  HYDROmorphone  Injectable 0.5 milliGRAM(s) IV Push every 4 hours PRN Severe Pain (7 - 10)  nitroglycerin     SubLingual 0.4 milliGRAM(s) SubLingual daily PRN Chest Pain  oxyCODONE    IR 5 milliGRAM(s) Oral every 4 hours PRN Moderate Pain (4 - 6)      Allergies    Zosyn (Rash)    Intolerances    I&O's Detail    07 Apr 2019 07:01  -  08 Apr 2019 07:00  --------------------------------------------------------  IN:  Total IN: 0 mL    OUT:    Voided: 100 mL  Total OUT: 100 mL    Total NET: -100 mL      Patient was seen and evaluated on dialysis.   Patient is tolerating the procedure well.   Continue dialysis:   Dialyzer:  revaclear 300 on 2k with uf goal 2kg  bfr of 400 via avf     Vital Signs Last 24 Hrs  T(C): 36.1 (07 Apr 2019 21:26), Max: 36.4 (07 Apr 2019 11:52)  T(F): 97 (07 Apr 2019 21:26), Max: 97.5 (07 Apr 2019 11:52)  HR: 65 (08 Apr 2019 09:00) (56 - 104)  BP: 113/36 (08 Apr 2019 09:00) (101/32 - 137/36)  BP(mean): 57 (08 Apr 2019 09:00) (48 - 72)  RR: 16 (08 Apr 2019 09:00) (11 - 22)  SpO2: 95% (08 Apr 2019 09:00) (93% - 100%)  Daily     Daily     PHYSICAL EXAM:  General: alert. awake Ox3  HEENT: MMM  CV: s1s2 rrr  LUNGS: B/L CTA  EXT: no edema    LABS:  pending         PT/INR - ( 07 Apr 2019 06:47 )   PT: 41.8 sec;   INR: 3.62 ratio
COVERING FOR DR PERRY   Chief complaints.   Pt admitted for evaluation of Right Subclavian and Innominate vein stenoses.  83 yo man with ESRD on maintenance HD for several months.   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  Post elective angioplasty.  Planned for Left LE angiogram due to non-healing foot wound.  Reports feeling well.  No new complaints.      today   feeling well   no complaints today   for LE angio Monday     3/29  angioplasty of R subclavian and innominate vein 3/28  AVF accessed successfully    PMHX and PSHX.  1.CAD ( RCA and LAD Bare metal stents in 2016)  2.A FIB on A/C  3.CHF-diastolic  4.HTN  5.Hx of UGIB  6.COPD  7.DM  8.Hx of DVT/IVC filter  9.PVD -post Right AKA   10.Hx of C diff colitis  11.Hx of BPH :S/p Green Light laser.                                                                                                                                                                                                                                                                                                                   FAMILY HISTORY:  Family history of colorectal cancer (Father)  Family history of diabetes mellitus (Mother, Sibling)  Family history of hypertension (Mother)      SOCIAL HISTORY : Former smoker, NO ETOH.  Allergies    Zosyn (Rash)    REVIEW OF SYSTEMS :  Feeling well.  Denies SOB  Denies Abdominal pain  Positive pain in buttock area    MEDICATIONS  (STANDING):  acetaminophen   Tablet .. 650 milliGRAM(s) Oral every 6 hours  allopurinol 100 milliGRAM(s) Oral daily  aspirin  chewable 81 milliGRAM(s) Oral daily  atorvastatin 20 milliGRAM(s) Oral at bedtime  buDESOnide   0.5 milliGRAM(s) Respule 0.5 milliGRAM(s) Inhalation two times a day  cholecalciferol 1000 Unit(s) Oral daily  cholestyramine Powder (Sugar-Free) 4 Gram(s) Oral daily  diltiazem    Tablet 60 milliGRAM(s) Oral four times a day  doxercalciferol Injectable 1 MICROGram(s) IV Push <User Schedule>  epoetin nelly Injectable 6000 Unit(s) IV Push <User Schedule>  finasteride 5 milliGRAM(s) Oral daily  fluticasone propionate (50 MICROgram(s)/actuation) Nasal Spray - Peds 1 Spray(s) Alternating Nostrils daily  heparin  Injectable 5000 Unit(s) SubCutaneous every 12 hours  lidocaine/prilocaine Cream 1 Application(s) Topical <User Schedule>  loratadine 10 milliGRAM(s) Oral daily  methotrexate 10 milliGRAM(s) Oral <User Schedule>  pantoprazole    Tablet 40 milliGRAM(s) Oral before breakfast  predniSONE   Tablet 5 milliGRAM(s) Oral daily  sevelamer hydrochloride Oral Tab/Cap - Peds 800 milliGRAM(s) Oral three times a day with meals  silver sulfADIAZINE 1% Cream 1 Application(s) Topical daily    MEDICATIONS  (PRN):  nitroglycerin     SubLingual 0.4 milliGRAM(s) SubLingual daily PRN Chest Pain       Vital Signs Last 24 Hrs  T(C): 36.5 (30 Mar 2019 11:59), Max: 36.7 (29 Mar 2019 18:10)  T(F): 97.7 (30 Mar 2019 11:59), Max: 98 (29 Mar 2019 18:10)  HR: 80 (30 Mar 2019 11:59) (75 - 93)  BP: 122/46 (30 Mar 2019 11:59) (122/46 - 159/57)  BP(mean): --  RR: 17 (30 Mar 2019 11:59) (17 - 18)  SpO2: 96% (30 Mar 2019 11:59) (93% - 96%)    I&O's Summary    29 Mar 2019 07:01  -  30 Mar 2019 07:00  --------------------------------------------------------  IN: 580 mL / OUT: 0 mL / NET: 580 mL      PHYSICAL EXAM:  alert and comfortable  GEN: no distress  HEENT: WNL  NECK : supple  CV: S1S2 RRR  LUNGS: Clear to aus  ABD: soft  EXT: Left foot in dressing ..  Edema much improved.  R AVF with ecchymosis, no hematoma or dc    LABS:                    141    |  107    |  46     ----------------------------<  74        29 Mar 2019 07:06  4.4     |  23     |  4.74     141    |  107    |  28     ----------------------------<  89        28 Mar 2019 06:43  4.0     |  28     |  3.63     Ca    7.9        29 Mar 2019 07:06  Ca    8.5        28 Mar 2019 06:43    Phos  3.6       29 Mar 2019 14:20                       9.7    5.44  )-----------( 121      ( 30 Mar 2019 12:19 )             31.4                         9.1    2.83  )-----------( 104      ( 29 Mar 2019 07:06 )             28.9
Chief complaints.   Pt admitted for evaluation of Right Subclavian and Innominate vein stenoses.    HPI:  85 yo man with ESRD on maintenance HD for several months.   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  Post elective angioplasty.  Planned for Left LE angiogram due to non-healing foot wound.  Reports feeling well.  No new complaints.    3/29  angioplasty of R subclavian and innominate vein 3/28  AVF accessed successfully  Tolerating HD  pts daughter at bedside  cont epo 6000 u  for LE angio monday      PMHX and PSHX.  1.CAD ( RCA and LAD Bare metal stents in 2016)  2.A FIB on A/C  3.CHF-diastolic  4.HTN  5.Hx of UGIB  6.COPD  7.DM  8.Hx of DVT/IVC filter  9.PVD -post Right AKA   10.Hx of C diff colitis  11.Hx of BPH :S/p Green Light laser.                                                                                                                                                                                                                                                                                                                   FAMILY HISTORY:  Family history of colorectal cancer (Father)  Family history of diabetes mellitus (Mother, Sibling)  Family history of hypertension (Mother)      SOCIAL HISTORY : Former smoker, NO ETOH.  Allergies    Zosyn (Rash)    REVIEW OF SYSTEMS :  Feeling well.  Denies SOB  Denies Abdominal pain  Positive pain in buttock area    MEDICATIONS  (STANDING):  acetaminophen   Tablet .. 650 milliGRAM(s) Oral every 6 hours  acetaminophen  IVPB .. 1000 milliGRAM(s) IV Intermittent once  allopurinol 100 milliGRAM(s) Oral daily  aspirin  chewable 81 milliGRAM(s) Oral daily  atorvastatin 20 milliGRAM(s) Oral at bedtime  buDESOnide   0.5 milliGRAM(s) Respule 0.5 milliGRAM(s) Inhalation two times a day  cholecalciferol 1000 Unit(s) Oral daily  cholestyramine Powder (Sugar-Free) 4 Gram(s) Oral daily  diltiazem    Tablet 60 milliGRAM(s) Oral four times a day  finasteride 5 milliGRAM(s) Oral daily  fluticasone propionate (50 MICROgram(s)/actuation) Nasal Spray - Peds 1 Spray(s) Alternating Nostrils daily  loratadine 10 milliGRAM(s) Oral daily  methotrexate 10 milliGRAM(s) Oral every week  pantoprazole    Tablet 40 milliGRAM(s) Oral before breakfast  predniSONE   Tablet 5 milliGRAM(s) Oral daily  sevelamer hydrochloride Oral Tab/Cap - Peds 800 milliGRAM(s) Oral three times a day with meals  silver sulfADIAZINE 1% Cream 1 Application(s) Topical daily  sodium chloride 0.9%. 1000 milliLiter(s) (75 mL/Hr) IV Continuous <Continuous>    MEDICATIONS  (PRN):  fentaNYL    Injectable 50 MICROGram(s) IV Push every 10 minutes PRN Severe Pain (7 - 10)  nitroglycerin     SubLingual 0.4 milliGRAM(s) SubLingual daily PRN Chest Pain  oxyCODONE    IR 5 milliGRAM(s) Oral once PRN Moderate Pain (4 - 6)       Vital Signs Last 24 Hrs  T(C): 36.6 (29 Mar 2019 14:19), Max: 36.8 (29 Mar 2019 12:27)  T(F): 97.8 (29 Mar 2019 14:19), Max: 98.2 (29 Mar 2019 12:27)  HR: 87 (29 Mar 2019 16:51) (70 - 87)  BP: 139/58 (29 Mar 2019 16:51) (128/53 - 142/54)  BP(mean): --  RR: 17 (29 Mar 2019 16:51) (16 - 18)  SpO2: 95% (29 Mar 2019 12:27) (95% - 98%)    28 Mar 2019 07:01  -  28 Mar 2019 13:13  --------------------------------------------------------  IN: 700 mL / OUT: 0 mL / NET: 700 mL      PHYSICAL EXAM:  alert and comfortable  seen on hd  GEN: no distress  HEENT: WNL  NECK : supple  CV: S1S2 RRR  LUNGS: Clear to aus  ABD: soft  EXT: Left foot in dressing ..  Edema much improved.  R AVF with ecchymosis, no hematoma or dc    LABS:                                   9.1    2.83  )-----------( 104      ( 29 Mar 2019 07:06 )             28.9     03-29    141  |  107  |  46<H>  ----------------------------<  74  4.4   |  23  |  4.74<H>    Ca    7.9<L>      29 Mar 2019 07:06  Phos  3.6     03-29
HPI:  83 y/o male with CAD, s/p stentsm PVD, s/p RLE AKA, diastolic HF, ESRD on HD s/p angioplasty of right subclavian vein due to persistent edema of RUE post fistula placement.  Pt also to have femoral endarterectomy on  Monday.  Medicine consult requested for medical management.  3/29/19: No cp, sob, n/v/f/c; feels as though getting a sore on his bottom.  3/30/19: No cp, sob, n/v/f/c; feels ok; offers no complaints  3/31/19: No cp, sob, n/v/f/c; await surgery tomorrow  19: PT TOLERATED surgery well - denies any pain currently, mild groin pain at times; no cp, sob, n/v/f/c  4/3/19: No cp, sob, n/v/f/c; currently on hd; left foot feels fine, some left groin pain  19: no cp, sob, n/v/f/c; left  to touch, left groin feels better  19: No cp, sob, n/v/f/c; on hd now; left leg pain is controlled    REVIEW OF SYSTEMS:   All 10 systems reviewed in detailed and found to be negative with the exception of what has already been described above      PHYSICAL EXAM:    Vital Signs Last 24 Hrs  T(C): 36.4 (2019 06:00), Max: 36.8 (2019 10:22)  T(F): 97.6 (2019 06:00), Max: 98.3 (2019 10:22)  HR: 67 (2019 06:00) (62 - 99)  BP: 125/41 (2019 05:00) (100/33 - 145/46)  BP(mean): 63 (2019 05:00) (49 - 72)  RR: 19 (2019 06:00) (12 - 20)  SpO2: 94% (2019 05:00) (91% - 96%)                GEN: A and O, NAD, PLEASANT mood stable  HEENT:  NC/AT, EOMI, no oropharyngeal lesions    NECK:   supple    CV:  +S1, +S2, regular, no murmurs or rubs    RESP:   lungs clear to auscultation bilaterally, no wheezing, rales, rhonchi, good air entry bilaterally RIGHT CHEST CATHETER    GI:  abdomen soft, non-tender, non-distended, normal BS,  no abdominal masses, no palpable masses    RECTAL:  not examined    :  not examined    MSK:   normal muscle tone, no atrophy, no rigidity, no contractions    EXT:   no clubbing, no cyanosis, RUE EDEMA - resolving, POS FISTULA no calf pain, swelling or erythema, RIGHT AKA, LLE WITH DRESSING C/D/I, LEFT FOOT DRESSING C/D/I    VASCULAR:  pulses equal and symmetric in the upper and lower extremities    NEURO:  AAOX3, no focal neurological deficits, follows all commands, able to move extremities spontaneously    SKIN:  no ulcers, lesions or rashes    LABS/IMAGIN-04    137  |  102  |  42<H>  ----------------------------<  117<H>  4.5   |  26  |  4.40<H>    Ca    7.4<L>      2019 20:06            PT/INR - ( 2019 06:05 )   PT: 11.3 sec;   INR: 1.02 ratio         MEDICATIONS  (STANDING):  acetaminophen   Tablet .. 650 milliGRAM(s) Oral every 6 hours  allopurinol 100 milliGRAM(s) Oral daily  aspirin  chewable 81 milliGRAM(s) Oral daily  atorvastatin 20 milliGRAM(s) Oral at bedtime  buDESOnide   0.5 milliGRAM(s) Respule 0.5 milliGRAM(s) Inhalation two times a day  cholecalciferol 1000 Unit(s) Oral daily  cholestyramine Powder (Sugar-Free) 4 Gram(s) Oral daily  diltiazem    Tablet 60 milliGRAM(s) Oral four times a day  doxercalciferol Injectable 1 MICROGram(s) IV Push <User Schedule>  epoetin nelly Injectable 6000 Unit(s) IV Push <User Schedule>  finasteride 5 milliGRAM(s) Oral daily  fluticasone propionate (50 MICROgram(s)/actuation) Nasal Spray - Peds 1 Spray(s) Alternating Nostrils daily  heparin  Injectable 5000 Unit(s) SubCutaneous every 12 hours  lidocaine 1% Injectable 0.2 milliLiter(s) Local Injection <User Schedule>  lidocaine/prilocaine Cream 1 Application(s) Topical <User Schedule>  loratadine 10 milliGRAM(s) Oral daily  methotrexate 10 milliGRAM(s) Oral <User Schedule>  pantoprazole    Tablet 40 milliGRAM(s) Oral before breakfast  predniSONE   Tablet 5 milliGRAM(s) Oral daily  sevelamer hydrochloride Oral Tab/Cap - Peds 800 milliGRAM(s) Oral three times a day with meals  silver sulfADIAZINE 1% Cream 1 Application(s) Topical daily  warfarin 5 milliGRAM(s) Oral daily    MEDICATIONS  (PRN):  HYDROmorphone  Injectable 0.5 milliGRAM(s) IV Push every 4 hours PRN Severe Pain (7 - 10)  nitroglycerin     SubLingual 0.4 milliGRAM(s) SubLingual daily PRN Chest Pain  oxyCODONE    IR 5 milliGRAM(s) Oral every 4 hours PRN Moderate Pain (4 - 6)
HPI:  83 y/o male with CAD, s/p stentsm PVD, s/p RLE AKA, diastolic HF, ESRD on HD s/p angioplasty of right subclavian vein due to persistent edema of RUE post fistula placement.  Pt also to have femoral endarterectomy on  Monday.  Medicine consult requested for medical management.  3/29/19: No cp, sob, n/v/f/c; feels as though getting a sore on his bottom.  3/30/19: No cp, sob, n/v/f/c; feels ok; offers no complaints  3/31/19: No cp, sob, n/v/f/c; await surgery tomorrow  4/2/19: PT TOLERATED surgery well - denies any pain currently, mild groin pain at times; no cp, sob, n/v/f/c  4/3/19: No cp, sob, n/v/f/c; currently on hd; left foot feels fine, some left groin pain  4/4/19: no cp, sob, n/v/f/c; left  to touch, left groin feels better  4/5/19: No cp, sob, n/v/f/c; on hd now; left leg pain is controlled  4/6/19:  No cp, sob, n/v/f/c; did fine with hd yest; no complaints today  4/7/19; no cp, sob, n/v/f/c; good appetite; no complaints this morning  4/8/19: No cp, sob, n/v/f/c; some left groin pain but not major    REVIEW OF SYSTEMS:   All 10 systems reviewed in detailed and found to be negative with the exception of what has already been described above      Vital Signs Last 24 Hrs  T(C): 36.1 (07 Apr 2019 21:26), Max: 36.4 (07 Apr 2019 11:52)  T(F): 97 (07 Apr 2019 21:26), Max: 97.5 (07 Apr 2019 11:52)  HR: 65 (08 Apr 2019 09:00) (56 - 104)  BP: 113/36 (08 Apr 2019 09:00) (101/32 - 137/36)  BP(mean): 57 (08 Apr 2019 09:00) (48 - 72)  RR: 16 (08 Apr 2019 09:00) (11 - 22)  SpO2: 95% (08 Apr 2019 09:00) (93% - 100%)              GEN: A and O, NAD, PLEASANT mood stable  HEENT:  NC/AT, EOMI, no oropharyngeal lesions    NECK:   supple    CV:  +S1, +S2, regular, no murmurs or rubs    RESP:   lungs clear to auscultation bilaterally, no wheezing, rales, rhonchi, good air entry bilaterally RIGHT CHEST CATHETER    GI:  abdomen soft, non-tender, non-distended, normal BS,  no abdominal masses, no palpable masses    RECTAL:  not examined    :  not examined    MSK:   normal muscle tone, no atrophy, no rigidity, no contractions    EXT:   no clubbing, no cyanosis, RUE EDEMA - resolving, POS FISTULA no calf pain, swelling or erythema, RIGHT AKA, LLE WITH DRESSING C/D/I, LEFT FOOT DRESSING C/D/I    VASCULAR:  pulses equal and symmetric in the upper and lower extremities    NEURO:  AAOX3, no focal neurological deficits, follows all commands, able to move extremities spontaneously    SKIN:  no ulcers, lesions or rashes    LABS/IMAGING:    am labs pending                  PT/INR - ( 07 Apr 2019 06:47 )   PT: 41.8 sec;   INR: 3.62 ratio      MEDICATIONS  (STANDING):  acetaminophen   Tablet .. 650 milliGRAM(s) Oral every 6 hours  allopurinol 100 milliGRAM(s) Oral daily  aspirin  chewable 81 milliGRAM(s) Oral daily  atorvastatin 20 milliGRAM(s) Oral at bedtime  buDESOnide   0.5 milliGRAM(s) Respule 0.5 milliGRAM(s) Inhalation two times a day  cholecalciferol 1000 Unit(s) Oral daily  cholestyramine Powder (Sugar-Free) 4 Gram(s) Oral daily  diltiazem    Tablet 60 milliGRAM(s) Oral four times a day  doxercalciferol Injectable 1 MICROGram(s) IV Push <User Schedule>  epoetin nelly Injectable 6000 Unit(s) IV Push <User Schedule>  finasteride 5 milliGRAM(s) Oral daily  fluticasone propionate (50 MICROgram(s)/actuation) Nasal Spray - Peds 1 Spray(s) Alternating Nostrils daily  lidocaine 1% Injectable 0.2 milliLiter(s) Local Injection <User Schedule>  lidocaine/prilocaine Cream 1 Application(s) Topical <User Schedule>  loratadine 10 milliGRAM(s) Oral daily  methotrexate 10 milliGRAM(s) Oral <User Schedule>  pantoprazole    Tablet 40 milliGRAM(s) Oral before breakfast  predniSONE   Tablet 5 milliGRAM(s) Oral daily  sevelamer hydrochloride Oral Tab/Cap - Peds 800 milliGRAM(s) Oral three times a day with meals  silver sulfADIAZINE 1% Cream 1 Application(s) Topical daily    MEDICATIONS  (PRN):  HYDROmorphone  Injectable 0.5 milliGRAM(s) IV Push every 4 hours PRN Severe Pain (7 - 10)  nitroglycerin     SubLingual 0.4 milliGRAM(s) SubLingual daily PRN Chest Pain  oxyCODONE    IR 5 milliGRAM(s) Oral every 4 hours PRN Moderate Pain (4 - 6)
HPI:  85 y/o male with CAD, s/p stentsm PVD, s/p RLE AKA, diastolic HF, ESRD on HD s/p angioplasty of right subclavian vein due to persistent edema of RUE post fistula placement.  Pt also to have femoral endarterectomy on  Monday.  Medicine consult requested for medical management.  3/29/19: No cp, sob, n/v/f/c; feels as though getting a sore on his bottom.  3/30/19: No cp, sob, n/v/f/c; feels ok; offers no complaints      REVIEW OF SYSTEMS:   All 10 systems reviewed in detailed and found to be negative with the exception of what has already been described above      PHYSICAL EXAM:    Vital Signs Last 24 Hrs  T(C): 36.5 (30 Mar 2019 11:59), Max: 36.7 (29 Mar 2019 18:10)  T(F): 97.7 (30 Mar 2019 11:59), Max: 98 (29 Mar 2019 18:10)  HR: 80 (30 Mar 2019 11:59) (70 - 93)  BP: 122/46 (30 Mar 2019 11:59) (122/46 - 159/57)  BP(mean): --  RR: 17 (30 Mar 2019 11:59) (16 - 18)  SpO2: 96% (30 Mar 2019 11:59) (93% - 96%)    GEN: A and O, NAD, PLEASANT mood stable  HEENT:  NC/AT, EOMI, no oropharyngeal lesions    NECK:   supple    CV:  +S1, +S2, regular, no murmurs or rubs    RESP:   lungs clear to auscultation bilaterally, no wheezing, rales, rhonchi, good air entry bilaterally RIGHT CHEST CATHETER    GI:  abdomen soft, non-tender, non-distended, normal BS,  no abdominal masses, no palpable masses    RECTAL:  not examined    :  not examined    MSK:   normal muscle tone, no atrophy, no rigidity, no contractions    EXT:   no clubbing, no cyanosis, RUE EDEMA, POS FISTULA no calf pain, swelling or erythema, RIGHT AKA, LLE WITH DRESSING C/D/I    VASCULAR:  pulses equal and symmetric in the upper and lower extremities    NEURO:  AAOX3, no focal neurological deficits, follows all commands, able to move extremities spontaneously    SKIN:  no ulcers, lesions or rashes    LABS/IMAGIN.1    2.83  )-----------( 104      ( 29 Mar 2019 07:06 )             28.9     03-    141  |  107  |  46<H>  ----------------------------<  74  4.4   |  23  |  4.74<H>    Ca    7.9<L>      29 Mar 2019 07:06  Phos  3.6                 PT/INR - ( 28 Mar 2019 06:43 )   PT: 12.1 sec;   INR: 1.09 ratio         PTT - ( 28 Mar 2019 06:43 )  PTT:31.4 sec    med  MEDICATIONS  (STANDING):  acetaminophen   Tablet .. 650 milliGRAM(s) Oral every 6 hours  allopurinol 100 milliGRAM(s) Oral daily  aspirin  chewable 81 milliGRAM(s) Oral daily  atorvastatin 20 milliGRAM(s) Oral at bedtime  buDESOnide   0.5 milliGRAM(s) Respule 0.5 milliGRAM(s) Inhalation two times a day  cholecalciferol 1000 Unit(s) Oral daily  cholestyramine Powder (Sugar-Free) 4 Gram(s) Oral daily  diltiazem    Tablet 60 milliGRAM(s) Oral four times a day  doxercalciferol Injectable 1 MICROGram(s) IV Push <User Schedule>  epoetin nelly Injectable 6000 Unit(s) IV Push <User Schedule>  finasteride 5 milliGRAM(s) Oral daily  fluticasone propionate (50 MICROgram(s)/actuation) Nasal Spray - Peds 1 Spray(s) Alternating Nostrils daily  heparin  Injectable 5000 Unit(s) SubCutaneous every 12 hours  lidocaine/prilocaine Cream 1 Application(s) Topical <User Schedule>  loratadine 10 milliGRAM(s) Oral daily  methotrexate 10 milliGRAM(s) Oral <User Schedule>  pantoprazole    Tablet 40 milliGRAM(s) Oral before breakfast  predniSONE   Tablet 5 milliGRAM(s) Oral daily  sevelamer hydrochloride Oral Tab/Cap - Peds 800 milliGRAM(s) Oral three times a day with meals  silver sulfADIAZINE 1% Cream 1 Application(s) Topical daily    MEDICATIONS  (PRN):  nitroglycerin     SubLingual 0.4 milliGRAM(s) SubLingual daily PRN Chest Pain
HPI:  85 y/o male with CAD, s/p stentsm PVD, s/p RLE AKA, diastolic HF, ESRD on HD s/p angioplasty of right subclavian vein due to persistent edema of RUE post fistula placement.  Pt also to have femoral endarterectomy on  Monday.  Medicine consult requested for medical management.  3/29/19: No cp, sob, n/v/f/c; feels as though getting a sore on his bottom.  3/30/19: No cp, sob, n/v/f/c; feels ok; offers no complaints  3/31/19: No cp, sob, n/v/f/c; await surgery tomorrow    REVIEW OF SYSTEMS:   All 10 systems reviewed in detailed and found to be negative with the exception of what has already been described above      PHYSICAL EXAM:    Vital Signs Last 24 Hrs  T(C): 36.7 (31 Mar 2019 05:11), Max: 36.7 (30 Mar 2019 17:05)  T(F): 98 (31 Mar 2019 05:11), Max: 98 (30 Mar 2019 17:05)  HR: 73 (31 Mar 2019 09:23) (73 - 80)  BP: 119/43 (31 Mar 2019 05:11) (119/43 - 133/48)  BP(mean): --  RR: 18 (31 Mar 2019 05:11) (17 - 18)  SpO2: 94% (31 Mar 2019 05:11) (94% - 100%)                GEN: A and O, NAD, PLEASANT mood stable  HEENT:  NC/AT, EOMI, no oropharyngeal lesions    NECK:   supple    CV:  +S1, +S2, regular, no murmurs or rubs    RESP:   lungs clear to auscultation bilaterally, no wheezing, rales, rhonchi, good air entry bilaterally RIGHT CHEST CATHETER    GI:  abdomen soft, non-tender, non-distended, normal BS,  no abdominal masses, no palpable masses    RECTAL:  not examined    :  not examined    MSK:   normal muscle tone, no atrophy, no rigidity, no contractions    EXT:   no clubbing, no cyanosis, RUE EDEMA - resolving, POS FISTULA no calf pain, swelling or erythema, RIGHT AKA, LLE WITH DRESSING C/D/I    VASCULAR:  pulses equal and symmetric in the upper and lower extremities    NEURO:  AAOX3, no focal neurological deficits, follows all commands, able to move extremities spontaneously    SKIN:  no ulcers, lesions or rashes    LABS/IMAGIN.1    2.83  )-----------( 104      ( 29 Mar 2019 07:06 )             28.9         141  |  107  |  46<H>  ----------------------------<  74  4.4   |  23  |  4.74<H>    Ca    7.9<L>      29 Mar 2019 07:06  Phos  3.6                 PT/INR - ( 28 Mar 2019 06:43 )   PT: 12.1 sec;   INR: 1.09 ratio         PTT - ( 28 Mar 2019 06:43 )  PTT:31.4 sec    MEDICATIONS  (STANDING):  acetaminophen   Tablet .. 650 milliGRAM(s) Oral every 6 hours  allopurinol 100 milliGRAM(s) Oral daily  aspirin  chewable 81 milliGRAM(s) Oral daily  atorvastatin 20 milliGRAM(s) Oral at bedtime  buDESOnide   0.5 milliGRAM(s) Respule 0.5 milliGRAM(s) Inhalation two times a day  cholecalciferol 1000 Unit(s) Oral daily  cholestyramine Powder (Sugar-Free) 4 Gram(s) Oral daily  diltiazem    Tablet 60 milliGRAM(s) Oral four times a day  doxercalciferol Injectable 1 MICROGram(s) IV Push <User Schedule>  epoetin nelly Injectable 6000 Unit(s) IV Push <User Schedule>  finasteride 5 milliGRAM(s) Oral daily  fluticasone propionate (50 MICROgram(s)/actuation) Nasal Spray - Peds 1 Spray(s) Alternating Nostrils daily  heparin  Injectable 5000 Unit(s) SubCutaneous every 12 hours  lidocaine/prilocaine Cream 1 Application(s) Topical <User Schedule>  loratadine 10 milliGRAM(s) Oral daily  methotrexate 10 milliGRAM(s) Oral <User Schedule>  pantoprazole    Tablet 40 milliGRAM(s) Oral before breakfast  predniSONE   Tablet 5 milliGRAM(s) Oral daily  sevelamer hydrochloride Oral Tab/Cap - Peds 800 milliGRAM(s) Oral three times a day with meals  silver sulfADIAZINE 1% Cream 1 Application(s) Topical daily    MEDICATIONS  (PRN):  nitroglycerin     SubLingual 0.4 milliGRAM(s) SubLingual daily PRN Chest Pain
HPI:  85 y/o male with CAD, s/p stentsm PVD, s/p RLE AKA, diastolic HF, ESRD on HD s/p angioplasty of right subclavian vein due to persistent edema of RUE post fistula placement.  Pt also to have femoral endarterectomy on  Monday.  Medicine consult requested for medical management.  3/29/19: No cp, sob, n/v/f/c; feels as though getting a sore on his bottom.  3/30/19: No cp, sob, n/v/f/c; feels ok; offers no complaints  3/31/19: No cp, sob, n/v/f/c; await surgery tomorrow  19: PT TOLERATED surgery well - denies any pain currently, mild groin pain at times; no cp, sob, n/v/f/c    REVIEW OF SYSTEMS:   All 10 systems reviewed in detailed and found to be negative with the exception of what has already been described above      PHYSICAL EXAM:    Vital Signs Last 24 Hrs  T(C): 36.1 (2019 14:25), Max: 37.2 (2019 22:33)  T(F): 96.9 (2019 14:25), Max: 98.9 (2019 22:33)  HR: 116 (2019 16:00) (80 - 121)  BP: 113/45 (2019 16:00) (87/34 - 133/43)  BP(mean): 63 (2019 16:00) (45 - 77)  RR: 21 (2019 16:00) (7 - 27)  SpO2: 96% (2019 16:00) (91% - 100%)                GEN: A and O, NAD, PLEASANT mood stable  HEENT:  NC/AT, EOMI, no oropharyngeal lesions    NECK:   supple    CV:  +S1, +S2, regular, no murmurs or rubs    RESP:   lungs clear to auscultation bilaterally, no wheezing, rales, rhonchi, good air entry bilaterally RIGHT CHEST CATHETER    GI:  abdomen soft, non-tender, non-distended, normal BS,  no abdominal masses, no palpable masses    RECTAL:  not examined    :  not examined    MSK:   normal muscle tone, no atrophy, no rigidity, no contractions    EXT:   no clubbing, no cyanosis, RUE EDEMA - resolving, POS FISTULA no calf pain, swelling or erythema, RIGHT AKA, LLE WITH DRESSING C/D/I, LEFT FOOT DRESSING C/D/I    VASCULAR:  pulses equal and symmetric in the upper and lower extremities    NEURO:  AAOX3, no focal neurological deficits, follows all commands, able to move extremities spontaneously    SKIN:  no ulcers, lesions or rashes    LABS/IMAGIN.3   3.95  )-----------( 150      ( 2019 08:38 )             33.7     04-    141  |  104  |  40<H>  ----------------------------<  85  5.2   |  26  |  4.03<H>    Ca    7.2<L>      2019 08:38  Phos  5.6     04-    TPro  x   /  Alb  2.6<L>  /  TBili  x   /  DBili  x   /  AST  x   /  ALT  x   /  AlkPhos  x           LIVER FUNCTIONS - ( 2019 07:30 )  Alb: 2.6 g/dL / Pro: x     / ALK PHOS: x     / ALT: x     / AST: x     / GGT: x               MEDICATIONS  (STANDING):  acetaminophen   Tablet .. 650 milliGRAM(s) Oral every 6 hours  allopurinol 100 milliGRAM(s) Oral daily  aspirin  chewable 81 milliGRAM(s) Oral daily  atorvastatin 20 milliGRAM(s) Oral at bedtime  buDESOnide   0.5 milliGRAM(s) Respule 0.5 milliGRAM(s) Inhalation two times a day  cholecalciferol 1000 Unit(s) Oral daily  cholestyramine Powder (Sugar-Free) 4 Gram(s) Oral daily  diltiazem    Tablet 60 milliGRAM(s) Oral four times a day  doxercalciferol Injectable 1 MICROGram(s) IV Push <User Schedule>  epoetin nelly Injectable 6000 Unit(s) IV Push <User Schedule>  finasteride 5 milliGRAM(s) Oral daily  fluticasone propionate (50 MICROgram(s)/actuation) Nasal Spray - Peds 1 Spray(s) Alternating Nostrils daily  heparin  Injectable 5000 Unit(s) SubCutaneous every 12 hours  lidocaine/prilocaine Cream 1 Application(s) Topical <User Schedule>  loratadine 10 milliGRAM(s) Oral daily  methotrexate 10 milliGRAM(s) Oral <User Schedule>  pantoprazole    Tablet 40 milliGRAM(s) Oral before breakfast  predniSONE   Tablet 5 milliGRAM(s) Oral daily  sevelamer carbonate 800 milliGRAM(s) Oral three times a day with meals  sevelamer hydrochloride Oral Tab/Cap - Peds 800 milliGRAM(s) Oral three times a day with meals  silver sulfADIAZINE 1% Cream 1 Application(s) Topical daily    MEDICATIONS  (PRN):  HYDROmorphone  Injectable 0.5 milliGRAM(s) IV Push every 4 hours PRN Severe Pain (7 - 10)  nitroglycerin     SubLingual 0.4 milliGRAM(s) SubLingual daily PRN Chest Pain  oxyCODONE    IR 5 milliGRAM(s) Oral every 4 hours PRN Moderate Pain (4 - 6)
HPI:  85 y/o male with CAD, s/p stentsm PVD, s/p RLE AKA, diastolic HF, ESRD on HD s/p angioplasty of right subclavian vein due to persistent edema of RUE post fistula placement.  Pt also to have femoral endarterectomy on  Monday.  Medicine consult requested for medical management.  3/29/19: No cp, sob, n/v/f/c; feels as though getting a sore on his bottom.  3/30/19: No cp, sob, n/v/f/c; feels ok; offers no complaints  3/31/19: No cp, sob, n/v/f/c; await surgery tomorrow  19: PT TOLERATED surgery well - denies any pain currently, mild groin pain at times; no cp, sob, n/v/f/c  4/3/19: No cp, sob, n/v/f/c; currently on hd; left foot feels fine, some left groin pain    REVIEW OF SYSTEMS:   All 10 systems reviewed in detailed and found to be negative with the exception of what has already been described above      PHYSICAL EXAM:    Vital Signs Last 24 Hrs  T(C): 36.6 (2019 07:45), Max: 36.7 (2019 21:11)  T(F): 97.8 (2019 07:45), Max: 98 (2019 21:11)  HR: 84 (2019 11:03) (68 - 121)  BP: 158/35 (2019 11:03) (87/34 - 158/35)  BP(mean): 57 (2019 05:00) (45 - 71)  RR: 19 (2019 11:03) (0 - 22)  SpO2: 99% (2019 10:30) (91% - 100%)                GEN: A and O, NAD, PLEASANT mood stable  HEENT:  NC/AT, EOMI, no oropharyngeal lesions    NECK:   supple    CV:  +S1, +S2, regular, no murmurs or rubs    RESP:   lungs clear to auscultation bilaterally, no wheezing, rales, rhonchi, good air entry bilaterally RIGHT CHEST CATHETER    GI:  abdomen soft, non-tender, non-distended, normal BS,  no abdominal masses, no palpable masses    RECTAL:  not examined    :  not examined    MSK:   normal muscle tone, no atrophy, no rigidity, no contractions    EXT:   no clubbing, no cyanosis, RUE EDEMA - resolving, POS FISTULA no calf pain, swelling or erythema, RIGHT AKA, LLE WITH DRESSING C/D/I, LEFT FOOT DRESSING C/D/I, LEFT GROIN DRESSING C/D/I    VASCULAR:  pulses equal and symmetric in the upper and lower extremities    NEURO:  AAOX3, no focal neurological deficits, follows all commands, able to move extremities spontaneously    SKIN:  no ulcers, lesions or rashes    LABS/IMAGIN.9    3.31  )-----------( 156      ( 2019 08:00 )             30.2     04-03    135  |  102  |  56<H>  ----------------------------<  116<H>  4.8   |  22  |  4.95<H>    Ca    7.4<L>      2019 08:00  Phos  6.7     04-03    TPro  x   /  Alb  2.3<L>  /  TBili  x   /  DBili  x   /  AST  x   /  ALT  x   /  AlkPhos  x   04-        LIVER FUNCTIONS - ( 2019 08:00 )  Alb: 2.3 g/dL / Pro: x     / ALK PHOS: x     / ALT: x     / AST: x     / GGT: x           MEDICATIONS  (STANDING):  acetaminophen   Tablet .. 650 milliGRAM(s) Oral every 6 hours  allopurinol 100 milliGRAM(s) Oral daily  aspirin  chewable 81 milliGRAM(s) Oral daily  atorvastatin 20 milliGRAM(s) Oral at bedtime  buDESOnide   0.5 milliGRAM(s) Respule 0.5 milliGRAM(s) Inhalation two times a day  cholecalciferol 1000 Unit(s) Oral daily  cholestyramine Powder (Sugar-Free) 4 Gram(s) Oral daily  diltiazem    Tablet 60 milliGRAM(s) Oral four times a day  doxercalciferol Injectable 1 MICROGram(s) IV Push <User Schedule>  epoetin nelly Injectable 6000 Unit(s) IV Push <User Schedule>  finasteride 5 milliGRAM(s) Oral daily  fluticasone propionate (50 MICROgram(s)/actuation) Nasal Spray - Peds 1 Spray(s) Alternating Nostrils daily  heparin  Injectable 5000 Unit(s) SubCutaneous every 12 hours  lidocaine 1% Injectable 0.2 milliLiter(s) Local Injection <User Schedule>  lidocaine/prilocaine Cream 1 Application(s) Topical <User Schedule>  loratadine 10 milliGRAM(s) Oral daily  methotrexate 10 milliGRAM(s) Oral <User Schedule>  pantoprazole    Tablet 40 milliGRAM(s) Oral before breakfast  predniSONE   Tablet 5 milliGRAM(s) Oral daily  sevelamer carbonate 800 milliGRAM(s) Oral three times a day with meals  sevelamer hydrochloride Oral Tab/Cap - Peds 800 milliGRAM(s) Oral three times a day with meals  silver sulfADIAZINE 1% Cream 1 Application(s) Topical daily  warfarin 5 milliGRAM(s) Oral daily    MEDICATIONS  (PRN):  HYDROmorphone  Injectable 0.5 milliGRAM(s) IV Push every 4 hours PRN Severe Pain (7 - 10)  nitroglycerin     SubLingual 0.4 milliGRAM(s) SubLingual daily PRN Chest Pain  oxyCODONE    IR 5 milliGRAM(s) Oral every 4 hours PRN Moderate Pain (4 - 6)
HPI:  85 y/o male with CAD, s/p stentsm PVD, s/p RLE AKA, diastolic HF, ESRD on HD s/p angioplasty of right subclavian vein due to persistent edema of RUE post fistula placement.  Pt also to have femoral endarterectomy on  Monday.  Medicine consult requested for medical management.  3/29/19: No cp, sob, n/v/f/c; feels as though getting a sore on his bottom.  3/30/19: No cp, sob, n/v/f/c; feels ok; offers no complaints  3/31/19: No cp, sob, n/v/f/c; await surgery tomorrow  19: PT TOLERATED surgery well - denies any pain currently, mild groin pain at times; no cp, sob, n/v/f/c  4/3/19: No cp, sob, n/v/f/c; currently on hd; left foot feels fine, some left groin pain  19: no cp, sob, n/v/f/c; left  to touch, left groin feels better  19: No cp, sob, n/v/f/c; on hd now; left leg pain is controlled  19:  No cp, sob, n/v/f/c; did fine with hd yest; no complaints today    REVIEW OF SYSTEMS:   All 10 systems reviewed in detailed and found to be negative with the exception of what has already been described above      PHYSICAL EXAM:    Vital Signs Last 24 Hrs  T(C): 36.6 (2019 05:00), Max: 36.6 (2019 17:44)  T(F): 97.8 (2019 05:00), Max: 97.9 (2019 17:44)  HR: 80 (2019 08:05) (68 - 97)  BP: 126/52 (2019 06:00) (103/34 - 152/35)  BP(mean): 51 (2019 04:00) (50 - 74)  RR: 11 (2019 08:00) (11 - 27)  SpO2: 96% (2019 08:00) (93% - 97%)                GEN: A and O, NAD, PLEASANT mood stable  HEENT:  NC/AT, EOMI, no oropharyngeal lesions    NECK:   supple    CV:  +S1, +S2, regular, no murmurs or rubs    RESP:   lungs clear to auscultation bilaterally, no wheezing, rales, rhonchi, good air entry bilaterally RIGHT CHEST CATHETER    GI:  abdomen soft, non-tender, non-distended, normal BS,  no abdominal masses, no palpable masses    RECTAL:  not examined    :  not examined    MSK:   normal muscle tone, no atrophy, no rigidity, no contractions    EXT:   no clubbing, no cyanosis, RUE EDEMA - resolving, POS FISTULA no calf pain, swelling or erythema, RIGHT AKA, LLE WITH DRESSING C/D/I, LEFT FOOT DRESSING C/D/I    VASCULAR:  pulses equal and symmetric in the upper and lower extremities    NEURO:  AAOX3, no focal neurological deficits, follows all commands, able to move extremities spontaneously    SKIN:  no ulcers, lesions or rashes    LABS/IMAGIN.3    2.40  )-----------( 125      ( 2019 09:30 )             28.9     04-05    137  |  102  |  48<H>  ----------------------------<  160<H>  4.4   |  23  |  4.80<H>    Ca    7.3<L>      2019 09:30  Phos  5.7     04-05    TPro  x   /  Alb  2.3<L>  /  TBili  x   /  DBili  x   /  AST  x   /  ALT  x   /  AlkPhos  x   04-05        LIVER FUNCTIONS - ( 2019 09:30 )  Alb: 2.3 g/dL / Pro: x     / ALK PHOS: x     / ALT: x     / AST: x     / GGT: x           PT/INR - ( 2019 06:47 )   PT: 35.4 sec;   INR: 3.08 ratio         MEDICATIONS  (STANDING):  acetaminophen   Tablet .. 650 milliGRAM(s) Oral every 6 hours  allopurinol 100 milliGRAM(s) Oral daily  aspirin  chewable 81 milliGRAM(s) Oral daily  atorvastatin 20 milliGRAM(s) Oral at bedtime  buDESOnide   0.5 milliGRAM(s) Respule 0.5 milliGRAM(s) Inhalation two times a day  cholecalciferol 1000 Unit(s) Oral daily  cholestyramine Powder (Sugar-Free) 4 Gram(s) Oral daily  diltiazem    Tablet 60 milliGRAM(s) Oral four times a day  doxercalciferol Injectable 1 MICROGram(s) IV Push <User Schedule>  epoetin nelly Injectable 6000 Unit(s) IV Push <User Schedule>  finasteride 5 milliGRAM(s) Oral daily  fluticasone propionate (50 MICROgram(s)/actuation) Nasal Spray - Peds 1 Spray(s) Alternating Nostrils daily  heparin  Injectable 5000 Unit(s) SubCutaneous every 12 hours  lidocaine 1% Injectable 0.2 milliLiter(s) Local Injection <User Schedule>  lidocaine/prilocaine Cream 1 Application(s) Topical <User Schedule>  loratadine 10 milliGRAM(s) Oral daily  methotrexate 10 milliGRAM(s) Oral <User Schedule>  pantoprazole    Tablet 40 milliGRAM(s) Oral before breakfast  predniSONE   Tablet 5 milliGRAM(s) Oral daily  sevelamer hydrochloride Oral Tab/Cap - Peds 800 milliGRAM(s) Oral three times a day with meals  silver sulfADIAZINE 1% Cream 1 Application(s) Topical daily  warfarin 3 milliGRAM(s) Oral daily    MEDICATIONS  (PRN):  HYDROmorphone  Injectable 0.5 milliGRAM(s) IV Push every 4 hours PRN Severe Pain (7 - 10)  nitroglycerin     SubLingual 0.4 milliGRAM(s) SubLingual daily PRN Chest Pain  oxyCODONE    IR 5 milliGRAM(s) Oral every 4 hours PRN Moderate Pain (4 - 6)
HPI:  85 y/o male with CAD, s/p stentsm PVD, s/p RLE AKA, diastolic HF, ESRD on HD s/p angioplasty of right subclavian vein due to persistent edema of RUE post fistula placement.  Pt also to have femoral endarterectomy on  Monday.  Medicine consult requested for medical management.  3/29/19: No cp, sob, n/v/f/c; feels as though getting a sore on his bottom.  3/30/19: No cp, sob, n/v/f/c; feels ok; offers no complaints  3/31/19: No cp, sob, n/v/f/c; await surgery tomorrow  4/2/19: PT TOLERATED surgery well - denies any pain currently, mild groin pain at times; no cp, sob, n/v/f/c  4/3/19: No cp, sob, n/v/f/c; currently on hd; left foot feels fine, some left groin pain  4/4/19: no cp, sob, n/v/f/c; left  to touch, left groin feels better  4/5/19: No cp, sob, n/v/f/c; on hd now; left leg pain is controlled  4/6/19:  No cp, sob, n/v/f/c; did fine with hd yest; no complaints today  4/7/19; no cp, sob, n/v/f/c; good appetite; no complaints this morning    REVIEW OF SYSTEMS:   All 10 systems reviewed in detailed and found to be negative with the exception of what has already been described above      PHYSICAL EXAM:  Vital Signs Last 24 Hrs  T(C): 36.3 (07 Apr 2019 06:08), Max: 36.7 (06 Apr 2019 21:35)  T(F): 97.3 (07 Apr 2019 06:08), Max: 98 (06 Apr 2019 21:35)  HR: 83 (07 Apr 2019 10:00) (62 - 108)  BP: 140/41 (07 Apr 2019 09:00) (104/37 - 143/46)  BP(mean): 67 (07 Apr 2019 09:00) (49 - 83)  RR: 23 (07 Apr 2019 10:00) (12 - 23)  SpO2: 97% (07 Apr 2019 10:00) (93% - 100%)              GEN: A and O, NAD, PLEASANT mood stable  HEENT:  NC/AT, EOMI, no oropharyngeal lesions    NECK:   supple    CV:  +S1, +S2, regular, no murmurs or rubs    RESP:   lungs clear to auscultation bilaterally, no wheezing, rales, rhonchi, good air entry bilaterally RIGHT CHEST CATHETER    GI:  abdomen soft, non-tender, non-distended, normal BS,  no abdominal masses, no palpable masses    RECTAL:  not examined    :  not examined    MSK:   normal muscle tone, no atrophy, no rigidity, no contractions    EXT:   no clubbing, no cyanosis, RUE EDEMA - resolving, POS FISTULA no calf pain, swelling or erythema, RIGHT AKA, LLE WITH DRESSING C/D/I, LEFT FOOT DRESSING C/D/I    VASCULAR:  pulses equal and symmetric in the upper and lower extremities    NEURO:  AAOX3, no focal neurological deficits, follows all commands, able to move extremities spontaneously    SKIN:  no ulcers, lesions or rashes    LABS/IMAGING:                      PT/INR - ( 07 Apr 2019 06:47 )   PT: 41.8 sec;   INR: 3.62 ratio         MEDICATIONS  (STANDING):  acetaminophen   Tablet .. 650 milliGRAM(s) Oral every 6 hours  allopurinol 100 milliGRAM(s) Oral daily  aspirin  chewable 81 milliGRAM(s) Oral daily  atorvastatin 20 milliGRAM(s) Oral at bedtime  buDESOnide   0.5 milliGRAM(s) Respule 0.5 milliGRAM(s) Inhalation two times a day  cholecalciferol 1000 Unit(s) Oral daily  cholestyramine Powder (Sugar-Free) 4 Gram(s) Oral daily  diltiazem    Tablet 60 milliGRAM(s) Oral four times a day  doxercalciferol Injectable 1 MICROGram(s) IV Push <User Schedule>  epoetin nelly Injectable 6000 Unit(s) IV Push <User Schedule>  finasteride 5 milliGRAM(s) Oral daily  fluticasone propionate (50 MICROgram(s)/actuation) Nasal Spray - Peds 1 Spray(s) Alternating Nostrils daily  lidocaine 1% Injectable 0.2 milliLiter(s) Local Injection <User Schedule>  lidocaine/prilocaine Cream 1 Application(s) Topical <User Schedule>  loratadine 10 milliGRAM(s) Oral daily  methotrexate 10 milliGRAM(s) Oral <User Schedule>  pantoprazole    Tablet 40 milliGRAM(s) Oral before breakfast  predniSONE   Tablet 5 milliGRAM(s) Oral daily  sevelamer hydrochloride Oral Tab/Cap - Peds 800 milliGRAM(s) Oral three times a day with meals  silver sulfADIAZINE 1% Cream 1 Application(s) Topical daily  warfarin 3 milliGRAM(s) Oral daily    MEDICATIONS  (PRN):  HYDROmorphone  Injectable 0.5 milliGRAM(s) IV Push every 4 hours PRN Severe Pain (7 - 10)  nitroglycerin     SubLingual 0.4 milliGRAM(s) SubLingual daily PRN Chest Pain  oxyCODONE    IR 5 milliGRAM(s) Oral every 4 hours PRN Moderate Pain (4 - 6)
NEPHROLOGY INTERVAL HPI/OVERNIGHT EVENTS:    Date of Service: 04-03-19 @ 08:34  4/3 SY  Feeling well.  No new complaints.  No new events.    4/2 SY  S/p Left Ileofemoral endarterectomy.   Complicated by large blood loss due to calcified vessels leading to PRBC x 5 units and Plts.  Reports feeling comfortable this morning.  Improved Left foot pain.    HPI:  85 yo man with ESRD on maintenance HD for several months.   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  Post elective angioplasty.  Planned for Left LE angiogram due to non-healing foot wound.  Reports feeling well.  No new complaints.    PMHX and PSHX.  1.CAD ( RCA and LAD Bare metal stents in 2016)  2.A FIB on A/C  3.CHF-diastolic  4.HTN  5.Hx of UGIB  6.COPD  7.DM  8.Hx of DVT/IVC filter  9.PVD -post Right AKA   10.Hx of C diff colitis  11.Hx of BPH :S/p Green Light laser.                                                                                                                                                                                                                                                                                                                MEDICATIONS  (STANDING):  acetaminophen   Tablet .. 650 milliGRAM(s) Oral every 6 hours  allopurinol 100 milliGRAM(s) Oral daily  aspirin  chewable 81 milliGRAM(s) Oral daily  atorvastatin 20 milliGRAM(s) Oral at bedtime  buDESOnide   0.5 milliGRAM(s) Respule 0.5 milliGRAM(s) Inhalation two times a day  cholecalciferol 1000 Unit(s) Oral daily  cholestyramine Powder (Sugar-Free) 4 Gram(s) Oral daily  diltiazem    Tablet 60 milliGRAM(s) Oral four times a day  doxercalciferol Injectable 1 MICROGram(s) IV Push <User Schedule>  epoetin nelly Injectable 6000 Unit(s) IV Push <User Schedule>  finasteride 5 milliGRAM(s) Oral daily  fluticasone propionate (50 MICROgram(s)/actuation) Nasal Spray - Peds 1 Spray(s) Alternating Nostrils daily  heparin  Injectable 5000 Unit(s) SubCutaneous every 12 hours  lidocaine/prilocaine Cream 1 Application(s) Topical <User Schedule>  loratadine 10 milliGRAM(s) Oral daily  methotrexate 10 milliGRAM(s) Oral <User Schedule>  pantoprazole    Tablet 40 milliGRAM(s) Oral before breakfast  predniSONE   Tablet 5 milliGRAM(s) Oral daily  sevelamer carbonate 800 milliGRAM(s) Oral three times a day with meals  sevelamer hydrochloride Oral Tab/Cap - Peds 800 milliGRAM(s) Oral three times a day with meals  silver sulfADIAZINE 1% Cream 1 Application(s) Topical daily  warfarin 5 milliGRAM(s) Oral daily    MEDICATIONS  (PRN):  HYDROmorphone  Injectable 0.5 milliGRAM(s) IV Push every 4 hours PRN Severe Pain (7 - 10)  nitroglycerin     SubLingual 0.4 milliGRAM(s) SubLingual daily PRN Chest Pain  oxyCODONE    IR 5 milliGRAM(s) Oral every 4 hours PRN Moderate Pain (4 - 6)    Vital Signs Last 24 Hrs  T(C): 36.6 (03 Apr 2019 07:45), Max: 36.7 (02 Apr 2019 21:11)  T(F): 97.8 (03 Apr 2019 07:45), Max: 98 (02 Apr 2019 21:11)  HR: 75 (03 Apr 2019 08:30) (68 - 121)  BP: 119/42 (03 Apr 2019 08:30) (87/34 - 132/57)  BP(mean): 57 (03 Apr 2019 05:00) (45 - 77)  RR: 14 (03 Apr 2019 08:30) (0 - 22)  SpO2: 97% (03 Apr 2019 07:45) (91% - 100%)  Daily     Daily     04-02 @ 07:01  -  04-03 @ 07:00  --------------------------------------------------------  IN: 400 mL / OUT: 0 mL / NET: 400 mL    PHYSICAL EXAM:  Alert and comfortable  GENERAL: No distress  CHEST/LUNG: fair air entry  HEART: S1S2 RRR  ABDOMEN: soft  EXTREMITIES: LLE no edema  SKIN:     LABS:                        9.9    3.31  )-----------( 156      ( 03 Apr 2019 08:00 )             30.2     04-02    141  |  104  |  40<H>  ----------------------------<  85  5.2   |  26  |  4.03<H>    Ca    7.2<L>      02 Apr 2019 08:38                  RADIOLOGY & ADDITIONAL TESTS:
NEPHROLOGY INTERVAL HPI/OVERNIGHT EVENTS:    Date of Service: 04-04-19 @ 09:32  4/4 SY  No acute distress.  No new complaints.    4/3 SY  Feeling well.  No new complaints.  No new events.    4/2 SY  S/p Left Ileofemoral endarterectomy.   Complicated by large blood loss due to calcified vessels leading to PRBC x 5 units and Plts.  Reports feeling comfortable this morning.  Improved Left foot pain.    HPI:  83 yo man with ESRD on maintenance HD for several months.   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  Post elective angioplasty.  Planned for Left LE angiogram due to non-healing foot wound.  Reports feeling well.  No new complaints.    PMHX and PSHX.  1.CAD ( RCA and LAD Bare metal stents in 2016)  2.A FIB on A/C  3.CHF-diastolic  4.HTN  5.Hx of UGIB  6.COPD  7.DM  8.Hx of DVT/IVC filter  9.PVD -post Right AKA   10.Hx of C diff colitis  11.Hx of BPH :S/p Green Light laser.                                                                                                                                                                                                                                                                                                                MEDICATIONS  (STANDING):  acetaminophen   Tablet .. 650 milliGRAM(s) Oral every 6 hours  allopurinol 100 milliGRAM(s) Oral daily  aspirin  chewable 81 milliGRAM(s) Oral daily  atorvastatin 20 milliGRAM(s) Oral at bedtime  buDESOnide   0.5 milliGRAM(s) Respule 0.5 milliGRAM(s) Inhalation two times a day  cholecalciferol 1000 Unit(s) Oral daily  cholestyramine Powder (Sugar-Free) 4 Gram(s) Oral daily  diltiazem    Tablet 60 milliGRAM(s) Oral four times a day  doxercalciferol Injectable 1 MICROGram(s) IV Push <User Schedule>  epoetin nelly Injectable 6000 Unit(s) IV Push <User Schedule>  finasteride 5 milliGRAM(s) Oral daily  fluticasone propionate (50 MICROgram(s)/actuation) Nasal Spray - Peds 1 Spray(s) Alternating Nostrils daily  heparin  Injectable 5000 Unit(s) SubCutaneous every 12 hours  lidocaine 1% Injectable 0.2 milliLiter(s) Local Injection <User Schedule>  lidocaine/prilocaine Cream 1 Application(s) Topical <User Schedule>  loratadine 10 milliGRAM(s) Oral daily  methotrexate 10 milliGRAM(s) Oral <User Schedule>  pantoprazole    Tablet 40 milliGRAM(s) Oral before breakfast  predniSONE   Tablet 5 milliGRAM(s) Oral daily  sevelamer hydrochloride Oral Tab/Cap - Peds 800 milliGRAM(s) Oral three times a day with meals  silver sulfADIAZINE 1% Cream 1 Application(s) Topical daily  warfarin 5 milliGRAM(s) Oral daily    MEDICATIONS  (PRN):  HYDROmorphone  Injectable 0.5 milliGRAM(s) IV Push every 4 hours PRN Severe Pain (7 - 10)  nitroglycerin     SubLingual 0.4 milliGRAM(s) SubLingual daily PRN Chest Pain  oxyCODONE    IR 5 milliGRAM(s) Oral every 4 hours PRN Moderate Pain (4 - 6)    Vital Signs Last 24 Hrs  T(C): 36.4 (04 Apr 2019 05:40), Max: 37.3 (03 Apr 2019 21:19)  T(F): 97.5 (04 Apr 2019 05:40), Max: 99.2 (03 Apr 2019 21:19)  HR: 73 (04 Apr 2019 09:00) (70 - 125)  BP: 109/23 (04 Apr 2019 09:00) (90/33 - 158/35)  BP(mean): 45 (04 Apr 2019 09:00) (45 - 76)  RR: 17 (04 Apr 2019 09:00) (12 - 34)  SpO2: 98% (04 Apr 2019 09:00) (92% - 99%)    PHYSICAL EXAM:  Alert and comfortable  GENERAL: No distress  CHEST/LUNG: Fair air entry  HEART: S1S2 RRR  ABDOMEN: soft  EXTREMITIES: no edema  SKIN:     LABS:                        9.9    3.31  )-----------( 156      ( 03 Apr 2019 08:00 )             30.2     04-03    135  |  102  |  56<H>  ----------------------------<  116<H>  4.8   |  22  |  4.95<H>    Ca    7.4<L>      03 Apr 2019 08:00  Phos  6.7     04-03    TPro  x   /  Alb  2.3<L>  /  TBili  x   /  DBili  x   /  AST  x   /  ALT  x   /  AlkPhos  x   04-03    PT/INR - ( 04 Apr 2019 06:05 )   PT: 11.3 sec;   INR: 1.02 ratio                     RADIOLOGY & ADDITIONAL TESTS:
NEPHROLOGY INTERVAL HPI/OVERNIGHT EVENTS:    Date of Service: 04-09-19 @ 14:35  4/9 SY  Reports pain in right elbow post AVF cannulation.  Per RN staff, AVF cannulation problematic with venous return through Catheter.    04-08-19 @ 10:11  doing well, no new c/o  await INR to drift down for CVC to be pulled     4/4 SY  No acute distress.  No new complaints.    4/3 SY  Feeling well.  No new complaints.  No new events.    4/2 SY  S/p Left Ileofemoral endarterectomy.   Complicated by large blood loss due to calcified vessels leading to PRBC x 5 units and Plts.  Reports feeling comfortable this morning.  Improved Left foot pain.    HPI:  85 yo man with ESRD on maintenance HD for several months.   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  Post elective angioplasty.  Planned for Left LE angiogram due to non-healing foot wound.  Reports feeling well.  No new complaints.    PMHX and PSHX.  1.CAD ( RCA and LAD Bare metal stents in 2016)  2.A FIB on A/C  3.CHF-diastolic  4.HTN  5.Hx of UGIB  6.COPD  7.DM  8.Hx of DVT/IVC filter  9.PVD -post Right AKA   10.Hx of C diff colitis  11.Hx of BPH :S/p Green Light laser.                                                                                                                                                                                                                                                                                                                MEDICATIONS  (STANDING):  acetaminophen   Tablet .. 650 milliGRAM(s) Oral every 6 hours  allopurinol 100 milliGRAM(s) Oral daily  aspirin  chewable 81 milliGRAM(s) Oral daily  atorvastatin 20 milliGRAM(s) Oral at bedtime  buDESOnide   0.5 milliGRAM(s) Respule 0.5 milliGRAM(s) Inhalation two times a day  cholecalciferol 1000 Unit(s) Oral daily  cholestyramine Powder (Sugar-Free) 4 Gram(s) Oral daily  diltiazem    Tablet 60 milliGRAM(s) Oral four times a day  doxercalciferol Injectable 1 MICROGram(s) IV Push <User Schedule>  epoetin nelly Injectable 6000 Unit(s) IV Push <User Schedule>  finasteride 5 milliGRAM(s) Oral daily  fluticasone propionate (50 MICROgram(s)/actuation) Nasal Spray - Peds 1 Spray(s) Alternating Nostrils daily  lidocaine 1% Injectable 0.2 milliLiter(s) Local Injection <User Schedule>  lidocaine/prilocaine Cream 1 Application(s) Topical <User Schedule>  loratadine 10 milliGRAM(s) Oral daily  methotrexate 10 milliGRAM(s) Oral <User Schedule>  pantoprazole    Tablet 40 milliGRAM(s) Oral before breakfast  predniSONE   Tablet 5 milliGRAM(s) Oral daily  sevelamer hydrochloride Oral Tab/Cap - Peds 800 milliGRAM(s) Oral three times a day with meals  silver sulfADIAZINE 1% Cream 1 Application(s) Topical daily    MEDICATIONS  (PRN):  nitroglycerin     SubLingual 0.4 milliGRAM(s) SubLingual daily PRN Chest Pain    Vital Signs Last 24 Hrs  T(C): 36.2 (09 Apr 2019 12:24), Max: 37 (08 Apr 2019 15:15)  T(F): 97.1 (09 Apr 2019 12:24), Max: 98.6 (08 Apr 2019 15:15)  HR: 71 (09 Apr 2019 13:19) (66 - 102)  BP: 109/31 (09 Apr 2019 13:19) (100/29 - 166/49)  BP(mean): 52 (09 Apr 2019 13:19) (47 - 86)  RR: 19 (09 Apr 2019 13:19) (10 - 23)  SpO2: 94% (09 Apr 2019 13:19) (93% - 100%)  Daily     Daily     04-08 @ 07:01  -  04-09 @ 07:00  --------------------------------------------------------  IN: 0 mL / OUT: 175 mL / NET: -175 mL    PHYSICAL EXAM:  Alert and comfortable  GENERAL: No distress  CHEST/LUNG: Clear to aus  HEART: S1S2 RRR  ABDOMEN: soft  EXTREMITIES: no edema  SKIN:     LABS:                        9.6    6.38  )-----------( 100      ( 08 Apr 2019 11:00 )             30.1     04-08    143  |  108  |  54<H>  ----------------------------<  143<H>  3.9   |  21<L>  |  5.04<H>    Ca    7.5<L>      08 Apr 2019 11:00  Phos  5.9     04-08    TPro  x   /  Alb  2.3<L>  /  TBili  x   /  DBili  x   /  AST  x   /  ALT  x   /  AlkPhos  x   04-08    PT/INR - ( 08 Apr 2019 11:00 )   PT: 33.9 sec;   INR: 2.95 ratio                     RADIOLOGY & ADDITIONAL TESTS:
NEPHROLOGY INTERVAL HPI/OVERNIGHT EVENTS:    Date of Service: 04-10-19 @ 08:56  4/10 SY  Continues with discomfort in right arm.  AVF cannulated without difficulty today.    / SY  Reports pain in right elbow post AVF cannulation.  Per RN staff, AVF cannulation problematic with venous return through Catheter.    19 @ 10:11  doing well, no new c/o  await INR to drift down for CVC to be pulled     4/ SY  No acute distress.  No new complaints.    4/3 SY  Feeling well.  No new complaints.  No new events.    4/2 SY  S/p Left Ileofemoral endarterectomy.   Complicated by large blood loss due to calcified vessels leading to PRBC x 5 units and Plts.  Reports feeling comfortable this morning.  Improved Left foot pain.    HPI:  85 yo man with ESRD on maintenance HD for several months.   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  Post elective angioplasty.  Planned for Left LE angiogram due to non-healing foot wound.  Reports feeling well.  No new complaints.    PMHX and PSHX.  1.CAD ( RCA and LAD Bare metal stents in 2016)  2.A FIB on A/C  3.CHF-diastolic  4.HTN  5.Hx of UGIB  6.COPD  7.DM  8.Hx of DVT/IVC filter  9.PVD -post Right AKA   10.Hx of C diff colitis  11.Hx of BPH :S/p Green Light laser.                                                                                                                                                                                                                                                                                                               MEDICATIONS  (STANDING):  acetaminophen   Tablet .. 650 milliGRAM(s) Oral every 6 hours  allopurinol 100 milliGRAM(s) Oral daily  aspirin  chewable 81 milliGRAM(s) Oral daily  atorvastatin 20 milliGRAM(s) Oral at bedtime  buDESOnide   0.5 milliGRAM(s) Respule 0.5 milliGRAM(s) Inhalation two times a day  cholecalciferol 1000 Unit(s) Oral daily  cholestyramine Powder (Sugar-Free) 4 Gram(s) Oral daily  diltiazem    Tablet 60 milliGRAM(s) Oral four times a day  doxercalciferol Injectable 1 MICROGram(s) IV Push <User Schedule>  epoetin nelly Injectable 6000 Unit(s) IV Push <User Schedule>  finasteride 5 milliGRAM(s) Oral daily  fluticasone propionate (50 MICROgram(s)/actuation) Nasal Spray - Peds 1 Spray(s) Alternating Nostrils daily  lidocaine 1% Injectable 0.2 milliLiter(s) Local Injection <User Schedule>  lidocaine/prilocaine Cream 1 Application(s) Topical <User Schedule>  loratadine 10 milliGRAM(s) Oral daily  methotrexate 10 milliGRAM(s) Oral <User Schedule>  pantoprazole    Tablet 40 milliGRAM(s) Oral before breakfast  predniSONE   Tablet 5 milliGRAM(s) Oral daily  sevelamer hydrochloride Oral Tab/Cap - Peds 800 milliGRAM(s) Oral three times a day with meals  silver sulfADIAZINE 1% Cream 1 Application(s) Topical daily    MEDICATIONS  (PRN):  nitroglycerin     SubLingual 0.4 milliGRAM(s) SubLingual daily PRN Chest Pain    Vital Signs Last 24 Hrs  T(C): 36.7 (10 Apr 2019 08:27), Max: 36.8 (2019 16:53)  T(F): 98 (10 Apr 2019 08:27), Max: 98.2 (2019 16:53)  HR: 62 (10 Apr 2019 08:40) (59 - 86)  BP: 125/50 (10 Apr 2019 08:40) (104/31 - 153/63)  BP(mean): 50 (2019 20:00) (50 - 86)  RR: 19 (10 Apr 2019 08:40) (12 - 24)  SpO2: 98% (10 Apr 2019 05:16) (94% - 100%)  Daily     Daily Weight in k.3 (10 Apr 2019 05:16)    PHYSICAL EXAM:  Alert and appropriate  GENERAL: No distress  CHEST/LUNG: Clear to aus  HEART: S1S2 RRR  ABDOMEN: soft  EXTREMITIES: RUE with increased edema in upper arm.  SKIN:     LABS:                        8.9    5.99  )-----------( 99       ( 10 Apr 2019 08:20 )             28.6     04-09    142  |  111<H>  |  43<H>  ----------------------------<  133<H>  3.7   |  24  |  3.84<H>    Ca    7.4<L>      2019 19:58  Phos  5.9     04-08    TPro  x   /  Alb  2.3<L>  /  TBili  x   /  DBili  x   /  AST  x   /  ALT  x   /  AlkPhos  x   04-08    PT/INR - ( 10 Apr 2019 06:12 )   PT: 16.5 sec;   INR: 1.47 ratio         PTT - ( 10 Apr 2019 06:12 )  PTT:35.6 sec            RADIOLOGY & ADDITIONAL TESTS:
NEPHROLOGY INTERVAL HPI/OVERNIGHT EVENTS:    Date of Service: 19 @ 11:39   SY  Feeling well.  No new complaints.  Right Permcath to be removed.--D/w Dr. Mccracken.    4/10 SY  Continues with discomfort in right arm.  AVF cannulated without difficulty today.     SY  Reports pain in right elbow post AVF cannulation.  Per RN staff, AVF cannulation problematic with venous return through Catheter.    19 @ 10:11  doing well, no new c/o  await INR to drift down for CVC to be pulled      SY  No acute distress.  No new complaints.    4/3 SY  Feeling well.  No new complaints.  No new events.     SY  S/p Left Ileofemoral endarterectomy.   Complicated by large blood loss due to calcified vessels leading to PRBC x 5 units and Plts.  Reports feeling comfortable this morning.  Improved Left foot pain.    HPI:  83 yo man with ESRD on maintenance HD for several months.   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  Post elective angioplasty.  Planned for Left LE angiogram due to non-healing foot wound.  Reports feeling well.  No new complaints.    PMHX and PSHX.  1.CAD ( RCA and LAD Bare metal stents in 2016)  2.A FIB on A/C  3.CHF-diastolic  4.HTN  5.Hx of UGIB  6.COPD  7.DM  8.Hx of DVT/IVC filter  9.PVD -post Right AKA   10.Hx of C diff colitis  11.Hx of BPH :S/p Green Light laser.                                                                                                                                                                                                                                                                                                             MEDICATIONS  (STANDING):  acetaminophen   Tablet .. 650 milliGRAM(s) Oral every 6 hours  allopurinol 100 milliGRAM(s) Oral daily  aspirin  chewable 81 milliGRAM(s) Oral daily  atorvastatin 20 milliGRAM(s) Oral at bedtime  buDESOnide   0.5 milliGRAM(s) Respule 0.5 milliGRAM(s) Inhalation two times a day  cholecalciferol 1000 Unit(s) Oral daily  cholestyramine Powder (Sugar-Free) 4 Gram(s) Oral daily  diltiazem    Tablet 60 milliGRAM(s) Oral four times a day  doxercalciferol Injectable 1 MICROGram(s) IV Push <User Schedule>  epoetin nelly Injectable 6000 Unit(s) IV Push <User Schedule>  finasteride 5 milliGRAM(s) Oral daily  fluticasone propionate (50 MICROgram(s)/actuation) Nasal Spray - Peds 1 Spray(s) Alternating Nostrils daily  lidocaine 1% Injectable 0.2 milliLiter(s) Local Injection <User Schedule>  lidocaine/prilocaine Cream 1 Application(s) Topical <User Schedule>  loratadine 10 milliGRAM(s) Oral daily  methotrexate 10 milliGRAM(s) Oral <User Schedule>  pantoprazole    Tablet 40 milliGRAM(s) Oral before breakfast  predniSONE   Tablet 5 milliGRAM(s) Oral daily  sevelamer carbonate 800 milliGRAM(s) Oral three times a day with meals  silver sulfADIAZINE 1% Cream 1 Application(s) Topical daily    MEDICATIONS  (PRN):  nitroglycerin     SubLingual 0.4 milliGRAM(s) SubLingual daily PRN Chest Pain    Vital Signs Last 24 Hrs  T(C): 36.5 (2019 10:40), Max: 37.5 (10 Apr 2019 20:33)  T(F): 97.7 (2019 10:40), Max: 99.5 (10 Apr 2019 20:33)  HR: 67 (2019 10:40) (67 - 84)  BP: 120/41 (2019 10:40) (110/34 - 139/43)  BP(mean): --  RR: 16 (2019 10:40) (16 - 18)  SpO2: 96% (2019 10:40) (96% - 100%)  Daily     Daily Weight in k.5 (2019 05:04)    PHYSICAL EXAM:  Alert and comfortable  GENERAL: No distress  CHEST/LUNG: Clear to aus  HEART: S1S2 RRR  ABDOMEN: soft  EXTREMITIES: RUE edema  SKIN:     LABS:                        8.9    5.99  )-----------( 99       ( 10 Apr 2019 08:20 )             28.6     04-10    144  |  111<H>  |  48<H>  ----------------------------<  131<H>  3.8   |  20<L>  |  4.09<H>    Ca    7.6<L>      10 Apr 2019 08:20  Phos  4.7     04-10    TPro  x   /  Alb  2.2<L>  /  TBili  x   /  DBili  x   /  AST  x   /  ALT  x   /  AlkPhos  x   04-10    PT/INR - ( 10 Apr 2019 06:12 )   PT: 16.5 sec;   INR: 1.47 ratio         PTT - ( 10 Apr 2019 06:12 )  PTT:35.6 sec            RADIOLOGY & ADDITIONAL TESTS:
NEPHROLOGY INTERVAL HPI/OVERNIGHT EVENTS:  19 @ 11:42  no c/o doing well ,mild incisional pain   po intake moderate     MEDICATIONS  (STANDING):  acetaminophen   Tablet .. 650 milliGRAM(s) Oral every 6 hours  allopurinol 100 milliGRAM(s) Oral daily  aspirin  chewable 81 milliGRAM(s) Oral daily  atorvastatin 20 milliGRAM(s) Oral at bedtime  buDESOnide   0.5 milliGRAM(s) Respule 0.5 milliGRAM(s) Inhalation two times a day  cholecalciferol 1000 Unit(s) Oral daily  cholestyramine Powder (Sugar-Free) 4 Gram(s) Oral daily  diltiazem    Tablet 60 milliGRAM(s) Oral four times a day  doxercalciferol Injectable 1 MICROGram(s) IV Push <User Schedule>  epoetin nelly Injectable 6000 Unit(s) IV Push <User Schedule>  finasteride 5 milliGRAM(s) Oral daily  fluticasone propionate (50 MICROgram(s)/actuation) Nasal Spray - Peds 1 Spray(s) Alternating Nostrils daily  heparin  Injectable 5000 Unit(s) SubCutaneous every 12 hours  lidocaine 1% Injectable 0.2 milliLiter(s) Local Injection <User Schedule>  lidocaine/prilocaine Cream 1 Application(s) Topical <User Schedule>  loratadine 10 milliGRAM(s) Oral daily  methotrexate 10 milliGRAM(s) Oral <User Schedule>  pantoprazole    Tablet 40 milliGRAM(s) Oral before breakfast  predniSONE   Tablet 5 milliGRAM(s) Oral daily  sevelamer hydrochloride Oral Tab/Cap - Peds 800 milliGRAM(s) Oral three times a day with meals  silver sulfADIAZINE 1% Cream 1 Application(s) Topical daily  warfarin 5 milliGRAM(s) Oral daily    MEDICATIONS  (PRN):  HYDROmorphone  Injectable 0.5 milliGRAM(s) IV Push every 4 hours PRN Severe Pain (7 - 10)  nitroglycerin     SubLingual 0.4 milliGRAM(s) SubLingual daily PRN Chest Pain  oxyCODONE    IR 5 milliGRAM(s) Oral every 4 hours PRN Moderate Pain (4 - 6)      Allergies    Zosyn (Rash)    Intolerances        I&O's Detail    2019 07:01  -  2019 07:00  --------------------------------------------------------  IN:  Total IN: 0 mL    OUT:    Voided: 150 mL  Total OUT: 150 mL    Total NET: -150 mL    Patient was seen and evaluated on dialysis.   Patient is tolerating the procedure well.   Continue dialysis:   Dialyzer:  Revaclear 300 on 2k with uf goal of 1kg  via avf    Vital Signs Last 24 Hrs  T(C): 36.6 (2019 09:41), Max: 36.8 (2019 23:05)  T(F): 97.8 (2019 09:41), Max: 98.2 (2019 23:05)  HR: 70 (2019 11:31) (62 - 99)  BP: 120/36 (2019 11:31) (100/33 - 145/46)  BP(mean): 63 (2019 05:00) (49 - 72)  RR: 16 (2019 11:31) (12 - 20)  SpO2: 94% (2019 10:14) (91% - 96%)  Daily     Daily Weight in k (2019 06:00)    PHYSICAL EXAM:  General: alert. awake Ox3  HEENT: MMM  CV: s1s2 rrr  LUNGS: B/L CTA  EXT: no edema      LABS:                        9.3    2.40  )-----------( 125      ( 2019 09:30 )             28.9         137  |  102  |  48<H>  ----------------------------<  160<H>  4.4   |  23  |  4.80<H>    Ca    7.3<L>      2019 09:30  Phos  5.7         TPro  x   /  Alb  2.3<L>  /  TBili  x   /  DBili  x   /  AST  x   /  ALT  x   /  AlkPhos  x       PT/INR - ( 2019 09:30 )   PT: 20.6 sec;   INR: 1.82 ratio             Phosphorus Level, Serum: 5.7 mg/dL ( @ 09:30)
NEPHROLOGY INTERVAL HPI/OVERNIGHT EVENTS:  19 @ 11:50    doing well, no new c/o   for OR today   seen at HD    MEDICATIONS  (STANDING):  acetaminophen   Tablet .. 650 milliGRAM(s) Oral every 6 hours  allopurinol 100 milliGRAM(s) Oral daily  aspirin  chewable 81 milliGRAM(s) Oral daily  atorvastatin 20 milliGRAM(s) Oral at bedtime  buDESOnide   0.5 milliGRAM(s) Respule 0.5 milliGRAM(s) Inhalation two times a day  cholecalciferol 1000 Unit(s) Oral daily  cholestyramine Powder (Sugar-Free) 4 Gram(s) Oral daily  diltiazem    Tablet 60 milliGRAM(s) Oral four times a day  doxercalciferol Injectable 1 MICROGram(s) IV Push <User Schedule>  epoetin nelly Injectable 6000 Unit(s) IV Push <User Schedule>  finasteride 5 milliGRAM(s) Oral daily  fluticasone propionate (50 MICROgram(s)/actuation) Nasal Spray - Peds 1 Spray(s) Alternating Nostrils daily  heparin  Injectable 5000 Unit(s) SubCutaneous every 12 hours  lidocaine/prilocaine Cream 1 Application(s) Topical <User Schedule>  loratadine 10 milliGRAM(s) Oral daily  methotrexate 10 milliGRAM(s) Oral <User Schedule>  pantoprazole    Tablet 40 milliGRAM(s) Oral before breakfast  predniSONE   Tablet 5 milliGRAM(s) Oral daily  sevelamer hydrochloride Oral Tab/Cap - Peds 800 milliGRAM(s) Oral three times a day with meals  silver sulfADIAZINE 1% Cream 1 Application(s) Topical daily    MEDICATIONS  (PRN):  nitroglycerin     SubLingual 0.4 milliGRAM(s) SubLingual daily PRN Chest Pain      Allergies    Zosyn (Rash)    Intolerances        I&O's Detail    31 Mar 2019 07:01  -  2019 07:00  --------------------------------------------------------  IN:    Oral Fluid: 480 mL  Total IN: 480 mL    OUT:  Total OUT: 0 mL    Total NET: 480 mL    Patient was seen and evaluated on dialysis.   Patient is tolerating the procedure well.   Continue dialysis:   Dialyzer: revaclear 300 on 2k with uf goal of   2kg     Vital Signs Last 24 Hrs  T(C): 37.6 (2019 11:24), Max: 37.6 (2019 11:24)  T(F): 99.6 (2019 11:24), Max: 99.6 (2019 11:24)  HR: 89 (2019 11:24) (74 - 96)  BP: 155/46 (2019 11:24) (107/71 - 155/46)  BP(mean): --  RR: 16 (2019 11:24) (16 - 18)  SpO2: 95% (2019 05:47) (92% - 95%)  Daily     Daily Weight in k.8 (2019 05:47)    PHYSICAL EXAM:  General: alert. awake Ox3  HEENT: MMM  CV: s1s2 rrr  LUNGS: B/L CTA  EXT: no edema    LABS:                        9.6    4.98  )-----------( 133      ( 2019 07:30 )             30.2     04-    140  |  109<H>  |  61<H>  ----------------------------<  95  4.9   |  18<L>  |  5.82<H>    Ca    7.9<L>      2019 07:30  Phos  5.6         TPro  x   /  Alb  2.6<L>  /  TBili  x   /  DBili  x   /  AST  x   /  ALT  x   /  AlkPhos  x           Phosphorus Level, Serum: 5.6 mg/dL ( @ 07:30)
Post-op check: S/P iliofemoral Endarterectomy, and angioplasty of R subclavian and innominate vein      HPI:  The pt is an 83 y/o male with CAD, s/p stents PVD, s/p RLE AKA, diastolic HF, ESRD on HD s/p angioplasty of right subclavian vein due to persistent edema of RUE post fistula placement.     PAST MEDICAL & SURGICAL HISTORY:  Right AKA  Recurrent Clostridium difficile diarrhea  Diastolic CHF  Peripheral vascular disease  Afib  Anemia  CKD (chronic kidney disease)  COPD (chronic obstructive pulmonary disease)  SHAYY (obstructive sleep apnea)  Sepsis, due to unspecified organism: 2/2 poorly healing wounds b/l  Dyspepsia: On moderate exertion.  Sleep apnea, obstructive: Requires home 02 therapy, and treatment with BIPAP  Atelectasis  Pleural effusion, bilateral  Respiratory failure  Peripheral edema  CRI (chronic renal insufficiency)  Gout  Benign prostatic hypertrophy  Spinal stenosis  Hypercholesterolemia  GERD (gastroesophageal reflux disease)  CAD (coronary artery disease)  Hypertension  S/P angioplasty with stent  Cataract of left eye  Prostate: Surgery green light procedure.  S/P rotator cuff surgery: Right  S/P angioplasty      MEDICATIONS  (STANDING):  acetaminophen   Tablet .. 650 milliGRAM(s) Oral every 6 hours  allopurinol 100 milliGRAM(s) Oral daily  atorvastatin 20 milliGRAM(s) Oral at bedtime  buDESOnide   0.5 milliGRAM(s) Respule 0.5 milliGRAM(s) Inhalation two times a day  cholecalciferol 1000 Unit(s) Oral daily  cholestyramine Powder (Sugar-Free) 4 Gram(s) Oral daily  clindamycin IVPB 900 milliGRAM(s) IV Intermittent every 8 hours  diltiazem    Tablet 60 milliGRAM(s) Oral four times a day  doxercalciferol Injectable 1 MICROGram(s) IV Push <User Schedule>  epoetin nelly Injectable 6000 Unit(s) IV Push <User Schedule>  finasteride 5 milliGRAM(s) Oral daily  fluticasone propionate (50 MICROgram(s)/actuation) Nasal Spray - Peds 1 Spray(s) Alternating Nostrils daily  lidocaine/prilocaine Cream 1 Application(s) Topical <User Schedule>  loratadine 10 milliGRAM(s) Oral daily  methotrexate 10 milliGRAM(s) Oral <User Schedule>  pantoprazole    Tablet 40 milliGRAM(s) Oral before breakfast  predniSONE   Tablet 5 milliGRAM(s) Oral daily  sevelamer carbonate 800 milliGRAM(s) Oral three times a day with meals  sevelamer hydrochloride Oral Tab/Cap - Peds 800 milliGRAM(s) Oral three times a day with meals  silver sulfADIAZINE 1% Cream 1 Application(s) Topical daily      PHYSICAL EXAM:  T(C): 36.3 (04-01-19 @ 20:30), Max: 37.6 (04-01-19 @ 11:24)  HR: 82 (04-01-19 @ 20:00) (74 - 96)  BP: 114/43 (04-01-19 @ 20:00) (87/36 - 155/46)  RR: 17 (04-01-19 @ 20:00) (11 - 27)  SpO2: 100% (04-01-19 @ 20:00) (94% - 100%)  Wt(kg): --    Skin:  warm and dry.    Left groin:  minimal blood on dressing.  no hematoma, + palpable femoral pulse    Extremities:  Left pedal pulses not palpable, +doppler DP and PT, warm to touch                     Right AKA                                12.1   5.94  )-----------( 162      ( 01 Apr 2019 17:55 )             36.1       04-01    140  |  104  |  24<H>  ----------------------------<  151<H>  4.9   |  24  |  3.15<H>    Ca    7.6<L>      01 Apr 2019 17:55  Phos  5.6     04-01    TPro  x   /  Alb  2.6<L>  /  TBili  x   /  DBili  x   /  AST  x   /  ALT  x   /  AlkPhos  x   04-01
afebrile, VSS    comfortable    will check INR and observe dialysis via AVF but will probably discharge with catheter in place and remove as out patient after fistula proves to provide adequate dialysis for 2 weeks  MEDICATIONS  (STANDING):  acetaminophen   Tablet .. 650 milliGRAM(s) Oral every 6 hours  allopurinol 100 milliGRAM(s) Oral daily  aspirin  chewable 81 milliGRAM(s) Oral daily  atorvastatin 20 milliGRAM(s) Oral at bedtime  buDESOnide   0.5 milliGRAM(s) Respule 0.5 milliGRAM(s) Inhalation two times a day  cholecalciferol 1000 Unit(s) Oral daily  cholestyramine Powder (Sugar-Free) 4 Gram(s) Oral daily  diltiazem    Tablet 60 milliGRAM(s) Oral four times a day  doxercalciferol Injectable 1 MICROGram(s) IV Push <User Schedule>  epoetin nelly Injectable 6000 Unit(s) IV Push <User Schedule>  finasteride 5 milliGRAM(s) Oral daily  fluticasone propionate (50 MICROgram(s)/actuation) Nasal Spray - Peds 1 Spray(s) Alternating Nostrils daily  lidocaine 1% Injectable 0.2 milliLiter(s) Local Injection <User Schedule>  lidocaine/prilocaine Cream 1 Application(s) Topical <User Schedule>  loratadine 10 milliGRAM(s) Oral daily  methotrexate 10 milliGRAM(s) Oral <User Schedule>  pantoprazole    Tablet 40 milliGRAM(s) Oral before breakfast  predniSONE   Tablet 5 milliGRAM(s) Oral daily  sevelamer hydrochloride Oral Tab/Cap - Peds 800 milliGRAM(s) Oral three times a day with meals  silver sulfADIAZINE 1% Cream 1 Application(s) Topical daily    MEDICATIONS  (PRN):  nitroglycerin     SubLingual 0.4 milliGRAM(s) SubLingual daily PRN Chest Pain      Allergies    Zosyn (Rash)    Intolerances        Flatus: [ ] YES [ ] NO             Bowel Movement: [ ] YES [ ] NO  Pain (0-10):            Pain Control Adequate: [ ] YES [ ] NO  Nausea: [ ] YES [ ] NO            Vomiting: [ ] YES [ ] NO  Diarrhea: [ ] YES [ ] NO         Constipation: [ ] YES [ ] NO     Chest Pain: [ ] YES [ ] NO    SOB:  [ ] YES [ ] NO    Vital Signs Last 24 Hrs  T(C): 36.7 (10 Apr 2019 05:16), Max: 36.8 (09 Apr 2019 16:53)  T(F): 98.1 (10 Apr 2019 05:16), Max: 98.2 (09 Apr 2019 16:53)  HR: 59 (10 Apr 2019 05:16) (59 - 86)  BP: 139/43 (10 Apr 2019 05:16) (104/31 - 153/63)  BP(mean): 50 (09 Apr 2019 20:00) (50 - 86)  RR: 18 (10 Apr 2019 05:16) (12 - 24)  SpO2: 98% (10 Apr 2019 05:16) (94% - 100%)    I&O's Summary      Physical Exam:  General: NAD, resting comfortably  Pulmonary: normal resp effort, CTA-B  Cardiovascular: NSR  Abdominal: soft, NT/ND  Extremities: WWP, normal strength  Neuro: A/O x 3, CNs II-XII grossly intact, normal motor/sensation, no focal deficits  Pulses:   Right:                                                                          Left:  FEM [ ]2+ [ ]1+ [ ]doppler                                             FEM [ ]2+ [ ]1+ [ ]doppler    POP [ ]2+ [ ]1+ [ ]doppler                                             POP [ ]2+ [ ]1+ [ ]doppler    DP [ ]2+ [ ]1+ [ ]doppler                                                DP [ ]2+ [ ]1+ [ ]doppler  PT[ ]2+ [ ]1+ [ ]doppler                                                  PT [ ]2+ [ ]1+ [ ]doppler    LABS:                        9.6    6.38  )-----------( 100      ( 08 Apr 2019 11:00 )             30.1     04-09    142  |  111<H>  |  43<H>  ----------------------------<  133<H>  3.7   |  24  |  3.84<H>    Ca    7.4<L>      09 Apr 2019 19:58  Phos  5.9     04-08    TPro  x   /  Alb  2.3<L>  /  TBili  x   /  DBili  x   /  AST  x   /  ALT  x   /  AlkPhos  x   04-08    PT/INR - ( 10 Apr 2019 06:12 )   PT: 16.5 sec;   INR: 1.47 ratio         PTT - ( 10 Apr 2019 06:12 )  PTT:35.6 sec    LIVER FUNCTIONS - ( 08 Apr 2019 11:00 )  Alb: 2.3 g/dL / Pro: x     / ALK PHOS: x     / ALT: x     / AST: x     / GGT: x           CAPILLARY BLOOD GLUCOSE          RADIOLOGY & ADDITIONAL TESTS:
afebrile, VSS    no significant changes    dialysis proceeded uneventfully via fistula yesterday    will remove catheter at bedside    plan for discharge today or tomorrow    MEDICATIONS  (STANDING):  acetaminophen   Tablet .. 650 milliGRAM(s) Oral every 6 hours  allopurinol 100 milliGRAM(s) Oral daily  aspirin  chewable 81 milliGRAM(s) Oral daily  atorvastatin 20 milliGRAM(s) Oral at bedtime  buDESOnide   0.5 milliGRAM(s) Respule 0.5 milliGRAM(s) Inhalation two times a day  cholecalciferol 1000 Unit(s) Oral daily  cholestyramine Powder (Sugar-Free) 4 Gram(s) Oral daily  diltiazem    Tablet 60 milliGRAM(s) Oral four times a day  doxercalciferol Injectable 1 MICROGram(s) IV Push <User Schedule>  epoetin nelly Injectable 6000 Unit(s) IV Push <User Schedule>  finasteride 5 milliGRAM(s) Oral daily  fluticasone propionate (50 MICROgram(s)/actuation) Nasal Spray - Peds 1 Spray(s) Alternating Nostrils daily  lidocaine 1% Injectable 0.2 milliLiter(s) Local Injection <User Schedule>  lidocaine/prilocaine Cream 1 Application(s) Topical <User Schedule>  loratadine 10 milliGRAM(s) Oral daily  methotrexate 10 milliGRAM(s) Oral <User Schedule>  pantoprazole    Tablet 40 milliGRAM(s) Oral before breakfast  predniSONE   Tablet 5 milliGRAM(s) Oral daily  sevelamer carbonate 800 milliGRAM(s) Oral three times a day with meals  silver sulfADIAZINE 1% Cream 1 Application(s) Topical daily    MEDICATIONS  (PRN):  nitroglycerin     SubLingual 0.4 milliGRAM(s) SubLingual daily PRN Chest Pain      Allergies    Zosyn (Rash)    Intolerances        Flatus: [ ] YES [ ] NO             Bowel Movement: [ ] YES [ ] NO  Pain (0-10):            Pain Control Adequate: [ ] YES [ ] NO  Nausea: [ ] YES [ ] NO            Vomiting: [ ] YES [ ] NO  Diarrhea: [ ] YES [ ] NO         Constipation: [ ] YES [ ] NO     Chest Pain: [ ] YES [ ] NO    SOB:  [ ] YES [ ] NO    Vital Signs Last 24 Hrs  T(C): 36.5 (11 Apr 2019 10:40), Max: 37.5 (10 Apr 2019 20:33)  T(F): 97.7 (11 Apr 2019 10:40), Max: 99.5 (10 Apr 2019 20:33)  HR: 67 (11 Apr 2019 10:40) (67 - 84)  BP: 120/41 (11 Apr 2019 10:40) (110/34 - 139/43)  BP(mean): --  RR: 16 (11 Apr 2019 10:40) (16 - 18)  SpO2: 96% (11 Apr 2019 10:40) (96% - 100%)    I&O's Summary      Physical Exam:  General: NAD, resting comfortably  Pulmonary: normal resp effort, CTA-B  Cardiovascular: NSR  Abdominal: soft, NT/ND  Extremities: WWP, normal strength  Neuro: A/O x 3, CNs II-XII grossly intact, normal motor/sensation, no focal deficits  Pulses:   Right:                                                                          Left:  FEM [ ]2+ [ ]1+ [ ]doppler                                             FEM [ ]2+ [ ]1+ [ ]doppler    POP [ ]2+ [ ]1+ [ ]doppler                                             POP [ ]2+ [ ]1+ [ ]doppler    DP [ ]2+ [ ]1+ [ ]doppler                                                DP [ ]2+ [ ]1+ [ ]doppler  PT[ ]2+ [ ]1+ [ ]doppler                                                  PT [ ]2+ [ ]1+ [ ]doppler    LABS:                        8.9    5.99  )-----------( 99       ( 10 Apr 2019 08:20 )             28.6     04-10    144  |  111<H>  |  48<H>  ----------------------------<  131<H>  3.8   |  20<L>  |  4.09<H>    Ca    7.6<L>      10 Apr 2019 08:20  Phos  4.7     04-10    TPro  x   /  Alb  2.2<L>  /  TBili  x   /  DBili  x   /  AST  x   /  ALT  x   /  AlkPhos  x   04-10    PT/INR - ( 10 Apr 2019 06:12 )   PT: 16.5 sec;   INR: 1.47 ratio         PTT - ( 10 Apr 2019 06:12 )  PTT:35.6 sec    LIVER FUNCTIONS - ( 10 Apr 2019 08:20 )  Alb: 2.2 g/dL / Pro: x     / ALK PHOS: x     / ALT: x     / AST: x     / GGT: x           CAPILLARY BLOOD GLUCOSE          RADIOLOGY & ADDITIONAL TESTS:
afebrile, VSS    no significant changes overnight    will probably remove catheter this weekend (bedside) if dialysis via fistula continues without difficulty    MEDICATIONS  (STANDING):  acetaminophen   Tablet .. 650 milliGRAM(s) Oral every 6 hours  allopurinol 100 milliGRAM(s) Oral daily  aspirin  chewable 81 milliGRAM(s) Oral daily  atorvastatin 20 milliGRAM(s) Oral at bedtime  buDESOnide   0.5 milliGRAM(s) Respule 0.5 milliGRAM(s) Inhalation two times a day  cholecalciferol 1000 Unit(s) Oral daily  cholestyramine Powder (Sugar-Free) 4 Gram(s) Oral daily  diltiazem    Tablet 60 milliGRAM(s) Oral four times a day  doxercalciferol Injectable 1 MICROGram(s) IV Push <User Schedule>  epoetin nelly Injectable 6000 Unit(s) IV Push <User Schedule>  finasteride 5 milliGRAM(s) Oral daily  fluticasone propionate (50 MICROgram(s)/actuation) Nasal Spray - Peds 1 Spray(s) Alternating Nostrils daily  heparin  Injectable 5000 Unit(s) SubCutaneous every 12 hours  lidocaine 1% Injectable 0.2 milliLiter(s) Local Injection <User Schedule>  lidocaine/prilocaine Cream 1 Application(s) Topical <User Schedule>  loratadine 10 milliGRAM(s) Oral daily  methotrexate 10 milliGRAM(s) Oral <User Schedule>  pantoprazole    Tablet 40 milliGRAM(s) Oral before breakfast  predniSONE   Tablet 5 milliGRAM(s) Oral daily  sevelamer hydrochloride Oral Tab/Cap - Peds 800 milliGRAM(s) Oral three times a day with meals  silver sulfADIAZINE 1% Cream 1 Application(s) Topical daily  warfarin 5 milliGRAM(s) Oral daily    MEDICATIONS  (PRN):  HYDROmorphone  Injectable 0.5 milliGRAM(s) IV Push every 4 hours PRN Severe Pain (7 - 10)  nitroglycerin     SubLingual 0.4 milliGRAM(s) SubLingual daily PRN Chest Pain  oxyCODONE    IR 5 milliGRAM(s) Oral every 4 hours PRN Moderate Pain (4 - 6)      Allergies    Zosyn (Rash)    Intolerances        Flatus: [ ] YES [ ] NO             Bowel Movement: [ ] YES [ ] NO  Pain (0-10):            Pain Control Adequate: [ ] YES [ ] NO  Nausea: [ ] YES [ ] NO            Vomiting: [ ] YES [ ] NO  Diarrhea: [ ] YES [ ] NO         Constipation: [ ] YES [ ] NO     Chest Pain: [ ] YES [ ] NO    SOB:  [ ] YES [ ] NO    Vital Signs Last 24 Hrs  T(C): 36.4 (05 Apr 2019 06:00), Max: 36.8 (04 Apr 2019 10:22)  T(F): 97.6 (05 Apr 2019 06:00), Max: 98.3 (04 Apr 2019 10:22)  HR: 67 (05 Apr 2019 06:00) (62 - 99)  BP: 125/41 (05 Apr 2019 05:00) (100/33 - 145/46)  BP(mean): 63 (05 Apr 2019 05:00) (45 - 72)  RR: 19 (05 Apr 2019 06:00) (12 - 20)  SpO2: 94% (05 Apr 2019 05:00) (91% - 98%)    I&O's Summary    04 Apr 2019 07:01  -  05 Apr 2019 07:00  --------------------------------------------------------  IN: 0 mL / OUT: 150 mL / NET: -150 mL        Physical Exam:  General: NAD, resting comfortably  Pulmonary: normal resp effort, CTA-B  Cardiovascular: NSR  Abdominal: soft, NT/ND  Extremities: WWP, normal strength  Neuro: A/O x 3, CNs II-XII grossly intact, normal motor/sensation, no focal deficits  Pulses:   Right:                                                                          Left:  FEM [ ]2+ [ ]1+ [ ]doppler                                             FEM [ ]2+ [ ]1+ [ ]doppler    POP [ ]2+ [ ]1+ [ ]doppler                                             POP [ ]2+ [ ]1+ [ ]doppler    DP [ ]2+ [ ]1+ [ ]doppler                                                DP [ ]2+ [ ]1+ [ ]doppler  PT[ ]2+ [ ]1+ [ ]doppler                                                  PT [ ]2+ [ ]1+ [ ]doppler    LABS:                        9.9    3.31  )-----------( 156      ( 03 Apr 2019 08:00 )             30.2     04-04    137  |  102  |  42<H>  ----------------------------<  117<H>  4.5   |  26  |  4.40<H>    Ca    7.4<L>      04 Apr 2019 20:06  Phos  6.7     04-03    TPro  x   /  Alb  2.3<L>  /  TBili  x   /  DBili  x   /  AST  x   /  ALT  x   /  AlkPhos  x   04-03    PT/INR - ( 04 Apr 2019 06:05 )   PT: 11.3 sec;   INR: 1.02 ratio             LIVER FUNCTIONS - ( 03 Apr 2019 08:00 )  Alb: 2.3 g/dL / Pro: x     / ALK PHOS: x     / ALT: x     / AST: x     / GGT: x           CAPILLARY BLOOD GLUCOSE          RADIOLOGY & ADDITIONAL TESTS:
afebrile, VSS  INR noted    No L foot discomfort    dialysis proceeding via fistula without difficulty    will plan on catheter removal tomorrow    MEDICATIONS  (STANDING):  acetaminophen   Tablet .. 650 milliGRAM(s) Oral every 6 hours  allopurinol 100 milliGRAM(s) Oral daily  aspirin  chewable 81 milliGRAM(s) Oral daily  atorvastatin 20 milliGRAM(s) Oral at bedtime  buDESOnide   0.5 milliGRAM(s) Respule 0.5 milliGRAM(s) Inhalation two times a day  cholecalciferol 1000 Unit(s) Oral daily  cholestyramine Powder (Sugar-Free) 4 Gram(s) Oral daily  diltiazem    Tablet 60 milliGRAM(s) Oral four times a day  doxercalciferol Injectable 1 MICROGram(s) IV Push <User Schedule>  epoetin nelly Injectable 6000 Unit(s) IV Push <User Schedule>  finasteride 5 milliGRAM(s) Oral daily  fluticasone propionate (50 MICROgram(s)/actuation) Nasal Spray - Peds 1 Spray(s) Alternating Nostrils daily  heparin  Injectable 5000 Unit(s) SubCutaneous every 12 hours  lidocaine 1% Injectable 0.2 milliLiter(s) Local Injection <User Schedule>  lidocaine/prilocaine Cream 1 Application(s) Topical <User Schedule>  loratadine 10 milliGRAM(s) Oral daily  methotrexate 10 milliGRAM(s) Oral <User Schedule>  pantoprazole    Tablet 40 milliGRAM(s) Oral before breakfast  predniSONE   Tablet 5 milliGRAM(s) Oral daily  sevelamer hydrochloride Oral Tab/Cap - Peds 800 milliGRAM(s) Oral three times a day with meals  silver sulfADIAZINE 1% Cream 1 Application(s) Topical daily  warfarin 3 milliGRAM(s) Oral daily    MEDICATIONS  (PRN):  HYDROmorphone  Injectable 0.5 milliGRAM(s) IV Push every 4 hours PRN Severe Pain (7 - 10)  nitroglycerin     SubLingual 0.4 milliGRAM(s) SubLingual daily PRN Chest Pain  oxyCODONE    IR 5 milliGRAM(s) Oral every 4 hours PRN Moderate Pain (4 - 6)      Allergies    Zosyn (Rash)    Intolerances        Flatus: [ ] YES [ ] NO             Bowel Movement: [ ] YES [ ] NO  Pain (0-10):            Pain Control Adequate: [ ] YES [ ] NO  Nausea: [ ] YES [ ] NO            Vomiting: [ ] YES [ ] NO  Diarrhea: [ ] YES [ ] NO         Constipation: [ ] YES [ ] NO     Chest Pain: [ ] YES [ ] NO    SOB:  [ ] YES [ ] NO    Vital Signs Last 24 Hrs  T(C): 36.6 (06 Apr 2019 05:00), Max: 36.6 (05 Apr 2019 09:41)  T(F): 97.8 (06 Apr 2019 05:00), Max: 97.9 (05 Apr 2019 17:44)  HR: 80 (06 Apr 2019 08:05) (68 - 97)  BP: 126/52 (06 Apr 2019 06:00) (103/34 - 152/35)  BP(mean): 51 (06 Apr 2019 04:00) (50 - 74)  RR: 11 (06 Apr 2019 08:00) (11 - 27)  SpO2: 96% (06 Apr 2019 08:00) (93% - 97%)    I&O's Summary      Physical Exam:  General: NAD, resting comfortably  Pulmonary: normal resp effort, CTA-B  Cardiovascular: NSR  Abdominal: soft, NT/ND  Extremities: WWP, normal strength  Neuro: A/O x 3, CNs II-XII grossly intact, normal motor/sensation, no focal deficits  Pulses:   Right:                                                                          Left:  FEM [ ]2+ [ ]1+ [ ]doppler                                             FEM [ ]2+ [ ]1+ [ ]doppler    POP [ ]2+ [ ]1+ [ ]doppler                                             POP [ ]2+ [ ]1+ [ ]doppler    DP [ ]2+ [ ]1+ [ ]doppler                                                DP [ ]2+ [ ]1+ [ ]doppler  PT[ ]2+ [ ]1+ [ ]doppler                                                  PT [ ]2+ [ ]1+ [ ]doppler    LABS:                        9.3    2.40  )-----------( 125      ( 05 Apr 2019 09:30 )             28.9     04-05    137  |  102  |  48<H>  ----------------------------<  160<H>  4.4   |  23  |  4.80<H>    Ca    7.3<L>      05 Apr 2019 09:30  Phos  5.7     04-05    TPro  x   /  Alb  2.3<L>  /  TBili  x   /  DBili  x   /  AST  x   /  ALT  x   /  AlkPhos  x   04-05    PT/INR - ( 06 Apr 2019 06:47 )   PT: 35.4 sec;   INR: 3.08 ratio             LIVER FUNCTIONS - ( 05 Apr 2019 09:30 )  Alb: 2.3 g/dL / Pro: x     / ALK PHOS: x     / ALT: x     / AST: x     / GGT: x           CAPILLARY BLOOD GLUCOSE          RADIOLOGY & ADDITIONAL TESTS:
afebrile, VSS  feels better; wound pain negligible    L foot warm; groin dressing intact and without drainage    INR 3.6    will wait for it to drift down before removing catheter    MEDICATIONS  (STANDING):  acetaminophen   Tablet .. 650 milliGRAM(s) Oral every 6 hours  allopurinol 100 milliGRAM(s) Oral daily  aspirin  chewable 81 milliGRAM(s) Oral daily  atorvastatin 20 milliGRAM(s) Oral at bedtime  buDESOnide   0.5 milliGRAM(s) Respule 0.5 milliGRAM(s) Inhalation two times a day  cholecalciferol 1000 Unit(s) Oral daily  cholestyramine Powder (Sugar-Free) 4 Gram(s) Oral daily  diltiazem    Tablet 60 milliGRAM(s) Oral four times a day  doxercalciferol Injectable 1 MICROGram(s) IV Push <User Schedule>  epoetin nelly Injectable 6000 Unit(s) IV Push <User Schedule>  finasteride 5 milliGRAM(s) Oral daily  fluticasone propionate (50 MICROgram(s)/actuation) Nasal Spray - Peds 1 Spray(s) Alternating Nostrils daily  lidocaine 1% Injectable 0.2 milliLiter(s) Local Injection <User Schedule>  lidocaine/prilocaine Cream 1 Application(s) Topical <User Schedule>  loratadine 10 milliGRAM(s) Oral daily  methotrexate 10 milliGRAM(s) Oral <User Schedule>  pantoprazole    Tablet 40 milliGRAM(s) Oral before breakfast  predniSONE   Tablet 5 milliGRAM(s) Oral daily  sevelamer hydrochloride Oral Tab/Cap - Peds 800 milliGRAM(s) Oral three times a day with meals  silver sulfADIAZINE 1% Cream 1 Application(s) Topical daily  warfarin 3 milliGRAM(s) Oral daily    MEDICATIONS  (PRN):  HYDROmorphone  Injectable 0.5 milliGRAM(s) IV Push every 4 hours PRN Severe Pain (7 - 10)  nitroglycerin     SubLingual 0.4 milliGRAM(s) SubLingual daily PRN Chest Pain  oxyCODONE    IR 5 milliGRAM(s) Oral every 4 hours PRN Moderate Pain (4 - 6)      Allergies    Zosyn (Rash)    Intolerances        Flatus: [ ] YES [ ] NO             Bowel Movement: [ ] YES [ ] NO  Pain (0-10):            Pain Control Adequate: [ ] YES [ ] NO  Nausea: [ ] YES [ ] NO            Vomiting: [ ] YES [ ] NO  Diarrhea: [ ] YES [ ] NO         Constipation: [ ] YES [ ] NO     Chest Pain: [ ] YES [ ] NO    SOB:  [ ] YES [ ] NO    Vital Signs Last 24 Hrs  T(C): 36.3 (07 Apr 2019 06:08), Max: 36.7 (06 Apr 2019 21:35)  T(F): 97.3 (07 Apr 2019 06:08), Max: 98 (06 Apr 2019 21:35)  HR: 83 (07 Apr 2019 10:00) (62 - 108)  BP: 140/41 (07 Apr 2019 09:00) (104/37 - 143/46)  BP(mean): 67 (07 Apr 2019 09:00) (49 - 83)  RR: 23 (07 Apr 2019 10:00) (12 - 23)  SpO2: 97% (07 Apr 2019 10:00) (93% - 100%)    I&O's Summary      Physical Exam:  General: NAD, resting comfortably  Pulmonary: normal resp effort, CTA-B  Cardiovascular: NSR  Abdominal: soft, NT/ND  Extremities: WWP, normal strength  Neuro: A/O x 3, CNs II-XII grossly intact, normal motor/sensation, no focal deficits  Pulses:   Right:                                                                          Left:  FEM [ ]2+ [ ]1+ [ ]doppler                                             FEM [ ]2+ [ ]1+ [ ]doppler    POP [ ]2+ [ ]1+ [ ]doppler                                             POP [ ]2+ [ ]1+ [ ]doppler    DP [ ]2+ [ ]1+ [ ]doppler                                                DP [ ]2+ [ ]1+ [ ]doppler  PT[ ]2+ [ ]1+ [ ]doppler                                                  PT [ ]2+ [ ]1+ [ ]doppler    LABS:          PT/INR - ( 07 Apr 2019 06:47 )   PT: 41.8 sec;   INR: 3.62 ratio               CAPILLARY BLOOD GLUCOSE          RADIOLOGY & ADDITIONAL TESTS:
afebrile, VSS  no complaints of L foot pain, wound not painful  had problem with dialysis today with venous needle, returned via catheter  INR ~ 2.9    PE  R upper arm fistula patent with slight pulsatility of thrill, + bruit, softer in upper arm; ecchymosis of mid to upper arm but no signficant hematoma  L foot adequately perfused appearing  L groin incision without hematoma; no cellulitis or drainage; epidermolysis of distal medial flap (~ 3 cm)    A/P  patent AVF but issues with venous needle.  Will hold off removing catheter until dialysis consistent with two needles    continue local care to leg wound    hold coumadin again tonight        MEDICATIONS  (STANDING):  acetaminophen   Tablet .. 650 milliGRAM(s) Oral every 6 hours  allopurinol 100 milliGRAM(s) Oral daily  aspirin  chewable 81 milliGRAM(s) Oral daily  atorvastatin 20 milliGRAM(s) Oral at bedtime  buDESOnide   0.5 milliGRAM(s) Respule 0.5 milliGRAM(s) Inhalation two times a day  cholecalciferol 1000 Unit(s) Oral daily  cholestyramine Powder (Sugar-Free) 4 Gram(s) Oral daily  diltiazem    Tablet 60 milliGRAM(s) Oral four times a day  doxercalciferol Injectable 1 MICROGram(s) IV Push <User Schedule>  epoetin nelly Injectable 6000 Unit(s) IV Push <User Schedule>  finasteride 5 milliGRAM(s) Oral daily  fluticasone propionate (50 MICROgram(s)/actuation) Nasal Spray - Peds 1 Spray(s) Alternating Nostrils daily  lidocaine 1% Injectable 0.2 milliLiter(s) Local Injection <User Schedule>  lidocaine/prilocaine Cream 1 Application(s) Topical <User Schedule>  loratadine 10 milliGRAM(s) Oral daily  methotrexate 10 milliGRAM(s) Oral <User Schedule>  pantoprazole    Tablet 40 milliGRAM(s) Oral before breakfast  predniSONE   Tablet 5 milliGRAM(s) Oral daily  sevelamer hydrochloride Oral Tab/Cap - Peds 800 milliGRAM(s) Oral three times a day with meals  silver sulfADIAZINE 1% Cream 1 Application(s) Topical daily    MEDICATIONS  (PRN):  HYDROmorphone  Injectable 0.5 milliGRAM(s) IV Push every 4 hours PRN Severe Pain (7 - 10)  nitroglycerin     SubLingual 0.4 milliGRAM(s) SubLingual daily PRN Chest Pain  oxyCODONE    IR 5 milliGRAM(s) Oral every 4 hours PRN Moderate Pain (4 - 6)      Allergies    Zosyn (Rash)    Intolerances        Flatus: [ ] YES [ ] NO             Bowel Movement: [ ] YES [ ] NO  Pain (0-10):            Pain Control Adequate: [ ] YES [ ] NO  Nausea: [ ] YES [ ] NO            Vomiting: [ ] YES [ ] NO  Diarrhea: [ ] YES [ ] NO         Constipation: [ ] YES [ ] NO     Chest Pain: [ ] YES [ ] NO    SOB:  [ ] YES [ ] NO    Vital Signs Last 24 Hrs  T(C): 37 (08 Apr 2019 15:15), Max: 37 (08 Apr 2019 15:15)  T(F): 98.6 (08 Apr 2019 15:15), Max: 98.6 (08 Apr 2019 15:15)  HR: 94 (08 Apr 2019 15:15) (56 - 100)  BP: 132/50 (08 Apr 2019 16:02) (101/32 - 156/39)  BP(mean): 65 (08 Apr 2019 13:00) (51 - 67)  RR: 20 (08 Apr 2019 15:15) (11 - 27)  SpO2: 99% (08 Apr 2019 15:15) (94% - 100%)    I&O's Summary    07 Apr 2019 07:01  -  08 Apr 2019 07:00  --------------------------------------------------------  IN: 0 mL / OUT: 100 mL / NET: -100 mL    08 Apr 2019 07:01  -  08 Apr 2019 17:24  --------------------------------------------------------  IN: 0 mL / OUT: 100 mL / NET: -100 mL        Physical Exam:  General: NAD, resting comfortably  Pulmonary: normal resp effort, CTA-B  Cardiovascular: NSR  Abdominal: soft, NT/ND  Extremities: WWP, normal strength  Neuro: A/O x 3, CNs II-XII grossly intact, normal motor/sensation, no focal deficits  Pulses:   Right:                                                                          Left:  FEM [ ]2+ [ ]1+ [ ]doppler                                             FEM [ ]2+ [ ]1+ [ ]doppler    POP [ ]2+ [ ]1+ [ ]doppler                                             POP [ ]2+ [ ]1+ [ ]doppler    DP [ ]2+ [ ]1+ [ ]doppler                                                DP [ ]2+ [ ]1+ [ ]doppler  PT[ ]2+ [ ]1+ [ ]doppler                                                  PT [ ]2+ [ ]1+ [ ]doppler    LABS:                        9.6    6.38  )-----------( 100      ( 08 Apr 2019 11:00 )             30.1     04-08    143  |  108  |  54<H>  ----------------------------<  143<H>  3.9   |  21<L>  |  5.04<H>    Ca    7.5<L>      08 Apr 2019 11:00  Phos  5.9     04-08    TPro  x   /  Alb  2.3<L>  /  TBili  x   /  DBili  x   /  AST  x   /  ALT  x   /  AlkPhos  x   04-08    PT/INR - ( 08 Apr 2019 11:00 )   PT: 33.9 sec;   INR: 2.95 ratio             LIVER FUNCTIONS - ( 08 Apr 2019 11:00 )  Alb: 2.3 g/dL / Pro: x     / ALK PHOS: x     / ALT: x     / AST: x     / GGT: x           CAPILLARY BLOOD GLUCOSE          RADIOLOGY & ADDITIONAL TESTS:
afebrile, VSS  only complains of eliane anal pain; no L foot pain; he was OOB to chair today    Groin incision intact but epidermolysis of distal medial aspect; no drainage or cellulitis  L foot adequately perfused appearing; distal calf wound clean and without evidence of  infection; some granulation tissue forming    eliane anal area erythematous     A/P  stable  fistula providing acceptable dialysis, will remove Hemastar this weekend  continue local care to L leg wound  nurses have already performed eliane anal care      MEDICATIONS  (STANDING):  acetaminophen   Tablet .. 650 milliGRAM(s) Oral every 6 hours  allopurinol 100 milliGRAM(s) Oral daily  aspirin  chewable 81 milliGRAM(s) Oral daily  atorvastatin 20 milliGRAM(s) Oral at bedtime  buDESOnide   0.5 milliGRAM(s) Respule 0.5 milliGRAM(s) Inhalation two times a day  cholecalciferol 1000 Unit(s) Oral daily  cholestyramine Powder (Sugar-Free) 4 Gram(s) Oral daily  diltiazem    Tablet 60 milliGRAM(s) Oral four times a day  doxercalciferol Injectable 1 MICROGram(s) IV Push <User Schedule>  epoetin nelly Injectable 6000 Unit(s) IV Push <User Schedule>  finasteride 5 milliGRAM(s) Oral daily  fluticasone propionate (50 MICROgram(s)/actuation) Nasal Spray - Peds 1 Spray(s) Alternating Nostrils daily  heparin  Injectable 5000 Unit(s) SubCutaneous every 12 hours  lidocaine 1% Injectable 0.2 milliLiter(s) Local Injection <User Schedule>  lidocaine/prilocaine Cream 1 Application(s) Topical <User Schedule>  loratadine 10 milliGRAM(s) Oral daily  methotrexate 10 milliGRAM(s) Oral <User Schedule>  pantoprazole    Tablet 40 milliGRAM(s) Oral before breakfast  predniSONE   Tablet 5 milliGRAM(s) Oral daily  sevelamer hydrochloride Oral Tab/Cap - Peds 800 milliGRAM(s) Oral three times a day with meals  silver sulfADIAZINE 1% Cream 1 Application(s) Topical daily  warfarin 5 milliGRAM(s) Oral daily    MEDICATIONS  (PRN):  HYDROmorphone  Injectable 0.5 milliGRAM(s) IV Push every 4 hours PRN Severe Pain (7 - 10)  nitroglycerin     SubLingual 0.4 milliGRAM(s) SubLingual daily PRN Chest Pain  oxyCODONE    IR 5 milliGRAM(s) Oral every 4 hours PRN Moderate Pain (4 - 6)      Allergies    Zosyn (Rash)    Intolerances        Flatus: [ ] YES [ ] NO             Bowel Movement: [ ] YES [ ] NO  Pain (0-10):            Pain Control Adequate: [ ] YES [ ] NO  Nausea: [ ] YES [ ] NO            Vomiting: [ ] YES [ ] NO  Diarrhea: [ ] YES [ ] NO         Constipation: [ ] YES [ ] NO     Chest Pain: [ ] YES [ ] NO    SOB:  [ ] YES [ ] NO    Vital Signs Last 24 Hrs  T(C): 36.8 (04 Apr 2019 10:22), Max: 37.3 (03 Apr 2019 21:19)  T(F): 98.3 (04 Apr 2019 10:22), Max: 99.2 (03 Apr 2019 21:19)  HR: 68 (04 Apr 2019 17:00) (68 - 125)  BP: 100/33 (04 Apr 2019 17:00) (90/33 - 143/47)  BP(mean): 49 (04 Apr 2019 17:00) (45 - 71)  RR: 12 (04 Apr 2019 17:00) (12 - 34)  SpO2: 93% (04 Apr 2019 17:00) (92% - 98%)    I&O's Summary      Physical Exam:  General: NAD, resting comfortably  Pulmonary: normal resp effort, CTA-B  Cardiovascular: NSR  Abdominal: soft, NT/ND  Extremities: WWP, normal strength  Neuro: A/O x 3, CNs II-XII grossly intact, normal motor/sensation, no focal deficits  Pulses:   Right:                                                                          Left:  FEM [ ]2+ [ ]1+ [ ]doppler                                             FEM [ ]2+ [ ]1+ [ ]doppler    POP [ ]2+ [ ]1+ [ ]doppler                                             POP [ ]2+ [ ]1+ [ ]doppler    DP [ ]2+ [ ]1+ [ ]doppler                                                DP [ ]2+ [ ]1+ [ ]doppler  PT[ ]2+ [ ]1+ [ ]doppler                                                  PT [ ]2+ [ ]1+ [ ]doppler    LABS:                        9.9    3.31  )-----------( 156      ( 03 Apr 2019 08:00 )             30.2     04-03    135  |  102  |  56<H>  ----------------------------<  116<H>  4.8   |  22  |  4.95<H>    Ca    7.4<L>      03 Apr 2019 08:00  Phos  6.7     04-03    TPro  x   /  Alb  2.3<L>  /  TBili  x   /  DBili  x   /  AST  x   /  ALT  x   /  AlkPhos  x   04-03    PT/INR - ( 04 Apr 2019 06:05 )   PT: 11.3 sec;   INR: 1.02 ratio             LIVER FUNCTIONS - ( 03 Apr 2019 08:00 )  Alb: 2.3 g/dL / Pro: x     / ALK PHOS: x     / ALT: x     / AST: x     / GGT: x           CAPILLARY BLOOD GLUCOSE          RADIOLOGY & ADDITIONAL TESTS:
no changes    for OR tomorrow    MEDICATIONS  (STANDING):  acetaminophen   Tablet .. 650 milliGRAM(s) Oral every 6 hours  allopurinol 100 milliGRAM(s) Oral daily  aspirin  chewable 81 milliGRAM(s) Oral daily  atorvastatin 20 milliGRAM(s) Oral at bedtime  buDESOnide   0.5 milliGRAM(s) Respule 0.5 milliGRAM(s) Inhalation two times a day  cholecalciferol 1000 Unit(s) Oral daily  cholestyramine Powder (Sugar-Free) 4 Gram(s) Oral daily  diltiazem    Tablet 60 milliGRAM(s) Oral four times a day  doxercalciferol Injectable 1 MICROGram(s) IV Push <User Schedule>  epoetin nelly Injectable 6000 Unit(s) IV Push <User Schedule>  finasteride 5 milliGRAM(s) Oral daily  fluticasone propionate (50 MICROgram(s)/actuation) Nasal Spray - Peds 1 Spray(s) Alternating Nostrils daily  heparin  Injectable 5000 Unit(s) SubCutaneous every 12 hours  lidocaine/prilocaine Cream 1 Application(s) Topical <User Schedule>  loratadine 10 milliGRAM(s) Oral daily  methotrexate 10 milliGRAM(s) Oral <User Schedule>  pantoprazole    Tablet 40 milliGRAM(s) Oral before breakfast  predniSONE   Tablet 5 milliGRAM(s) Oral daily  sevelamer hydrochloride Oral Tab/Cap - Peds 800 milliGRAM(s) Oral three times a day with meals  silver sulfADIAZINE 1% Cream 1 Application(s) Topical daily    MEDICATIONS  (PRN):  nitroglycerin     SubLingual 0.4 milliGRAM(s) SubLingual daily PRN Chest Pain      Allergies    Zosyn (Rash)    Intolerances        Flatus: [ ] YES [ ] NO             Bowel Movement: [ ] YES [ ] NO  Pain (0-10):            Pain Control Adequate: [ ] YES [ ] NO  Nausea: [ ] YES [ ] NO            Vomiting: [ ] YES [ ] NO  Diarrhea: [ ] YES [ ] NO         Constipation: [ ] YES [ ] NO     Chest Pain: [ ] YES [ ] NO    SOB:  [ ] YES [ ] NO    Vital Signs Last 24 Hrs  T(C): 36.7 (31 Mar 2019 05:11), Max: 36.7 (30 Mar 2019 17:05)  T(F): 98 (31 Mar 2019 05:11), Max: 98 (30 Mar 2019 17:05)  HR: 73 (31 Mar 2019 09:23) (73 - 80)  BP: 119/43 (31 Mar 2019 05:11) (119/43 - 133/48)  BP(mean): --  RR: 18 (31 Mar 2019 05:11) (17 - 18)  SpO2: 94% (31 Mar 2019 05:11) (94% - 100%)    I&O's Summary      Physical Exam:  General: NAD, resting comfortably  Pulmonary: normal resp effort, CTA-B  Cardiovascular: NSR  Abdominal: soft, NT/ND  Extremities: WWP, normal strength  Neuro: A/O x 3, CNs II-XII grossly intact, normal motor/sensation, no focal deficits  Pulses:   Right:                                                                          Left:  FEM [ ]2+ [ ]1+ [ ]doppler                                             FEM [ ]2+ [ ]1+ [ ]doppler    POP [ ]2+ [ ]1+ [ ]doppler                                             POP [ ]2+ [ ]1+ [ ]doppler    DP [ ]2+ [ ]1+ [ ]doppler                                                DP [ ]2+ [ ]1+ [ ]doppler  PT[ ]2+ [ ]1+ [ ]doppler                                                  PT [ ]2+ [ ]1+ [ ]doppler    LABS:                        9.7    5.44  )-----------( 121      ( 30 Mar 2019 12:19 )             31.4       Phos  3.6     03-29            CAPILLARY BLOOD GLUCOSE          RADIOLOGY & ADDITIONAL TESTS:
no significant changes    will check INR tomorrow    also, to see if AVF provides acceptable dialysis before considering removing catheter    MEDICATIONS  (STANDING):  acetaminophen   Tablet .. 650 milliGRAM(s) Oral every 6 hours  allopurinol 100 milliGRAM(s) Oral daily  aspirin  chewable 81 milliGRAM(s) Oral daily  atorvastatin 20 milliGRAM(s) Oral at bedtime  buDESOnide   0.5 milliGRAM(s) Respule 0.5 milliGRAM(s) Inhalation two times a day  cholecalciferol 1000 Unit(s) Oral daily  cholestyramine Powder (Sugar-Free) 4 Gram(s) Oral daily  diltiazem    Tablet 60 milliGRAM(s) Oral four times a day  doxercalciferol Injectable 1 MICROGram(s) IV Push <User Schedule>  epoetin nelly Injectable 6000 Unit(s) IV Push <User Schedule>  finasteride 5 milliGRAM(s) Oral daily  fluticasone propionate (50 MICROgram(s)/actuation) Nasal Spray - Peds 1 Spray(s) Alternating Nostrils daily  lidocaine 1% Injectable 0.2 milliLiter(s) Local Injection <User Schedule>  lidocaine/prilocaine Cream 1 Application(s) Topical <User Schedule>  loratadine 10 milliGRAM(s) Oral daily  methotrexate 10 milliGRAM(s) Oral <User Schedule>  pantoprazole    Tablet 40 milliGRAM(s) Oral before breakfast  predniSONE   Tablet 5 milliGRAM(s) Oral daily  sevelamer hydrochloride Oral Tab/Cap - Peds 800 milliGRAM(s) Oral three times a day with meals  silver sulfADIAZINE 1% Cream 1 Application(s) Topical daily    MEDICATIONS  (PRN):  nitroglycerin     SubLingual 0.4 milliGRAM(s) SubLingual daily PRN Chest Pain      Allergies    Zosyn (Rash)    Intolerances        Flatus: [ ] YES [ ] NO             Bowel Movement: [ ] YES [ ] NO  Pain (0-10):            Pain Control Adequate: [ ] YES [ ] NO  Nausea: [ ] YES [ ] NO            Vomiting: [ ] YES [ ] NO  Diarrhea: [ ] YES [ ] NO         Constipation: [ ] YES [ ] NO     Chest Pain: [ ] YES [ ] NO    SOB:  [ ] YES [ ] NO    Vital Signs Last 24 Hrs  T(C): 36.8 (09 Apr 2019 16:53), Max: 36.8 (09 Apr 2019 16:53)  T(F): 98.2 (09 Apr 2019 16:53), Max: 98.2 (09 Apr 2019 16:53)  HR: 63 (09 Apr 2019 17:00) (63 - 102)  BP: 104/31 (09 Apr 2019 17:00) (100/29 - 166/49)  BP(mean): 51 (09 Apr 2019 17:00) (47 - 86)  RR: 12 (09 Apr 2019 17:00) (10 - 21)  SpO2: 96% (09 Apr 2019 17:00) (93% - 100%)    I&O's Summary    08 Apr 2019 07:01  -  09 Apr 2019 07:00  --------------------------------------------------------  IN: 0 mL / OUT: 175 mL / NET: -175 mL        Physical Exam:  General: NAD, resting comfortably  Pulmonary: normal resp effort, CTA-B  Cardiovascular: NSR  Abdominal: soft, NT/ND  Extremities: WWP, normal strength  Neuro: A/O x 3, CNs II-XII grossly intact, normal motor/sensation, no focal deficits  Pulses:   Right:                                                                          Left:  FEM [ ]2+ [ ]1+ [ ]doppler                                             FEM [ ]2+ [ ]1+ [ ]doppler    POP [ ]2+ [ ]1+ [ ]doppler                                             POP [ ]2+ [ ]1+ [ ]doppler    DP [ ]2+ [ ]1+ [ ]doppler                                                DP [ ]2+ [ ]1+ [ ]doppler  PT[ ]2+ [ ]1+ [ ]doppler                                                  PT [ ]2+ [ ]1+ [ ]doppler    LABS:                        9.6    6.38  )-----------( 100      ( 08 Apr 2019 11:00 )             30.1     04-08    143  |  108  |  54<H>  ----------------------------<  143<H>  3.9   |  21<L>  |  5.04<H>    Ca    7.5<L>      08 Apr 2019 11:00  Phos  5.9     04-08    TPro  x   /  Alb  2.3<L>  /  TBili  x   /  DBili  x   /  AST  x   /  ALT  x   /  AlkPhos  x   04-08    PT/INR - ( 08 Apr 2019 11:00 )   PT: 33.9 sec;   INR: 2.95 ratio             LIVER FUNCTIONS - ( 08 Apr 2019 11:00 )  Alb: 2.3 g/dL / Pro: x     / ALK PHOS: x     / ALT: x     / AST: x     / GGT: x           CAPILLARY BLOOD GLUCOSE          RADIOLOGY & ADDITIONAL TESTS:
remains stable, complains of wound pain L lower extremity; no pain in foot as he had pre op  HCT 33    L foot warm    stable post endarterectomy L CFA  for dialysis today via R braciocephalic AVF  will remove catheter next week once fistula provides acceptable dialysis for a few more sessions and needle graduated to #15 gauge  will start coumadin    MEDICATIONS  (STANDING):  acetaminophen   Tablet .. 650 milliGRAM(s) Oral every 6 hours  allopurinol 100 milliGRAM(s) Oral daily  aspirin  chewable 81 milliGRAM(s) Oral daily  atorvastatin 20 milliGRAM(s) Oral at bedtime  buDESOnide   0.5 milliGRAM(s) Respule 0.5 milliGRAM(s) Inhalation two times a day  cholecalciferol 1000 Unit(s) Oral daily  cholestyramine Powder (Sugar-Free) 4 Gram(s) Oral daily  diltiazem    Tablet 60 milliGRAM(s) Oral four times a day  doxercalciferol Injectable 1 MICROGram(s) IV Push <User Schedule>  epoetin nelly Injectable 6000 Unit(s) IV Push <User Schedule>  finasteride 5 milliGRAM(s) Oral daily  fluticasone propionate (50 MICROgram(s)/actuation) Nasal Spray - Peds 1 Spray(s) Alternating Nostrils daily  heparin  Injectable 5000 Unit(s) SubCutaneous every 12 hours  lidocaine/prilocaine Cream 1 Application(s) Topical <User Schedule>  loratadine 10 milliGRAM(s) Oral daily  methotrexate 10 milliGRAM(s) Oral <User Schedule>  pantoprazole    Tablet 40 milliGRAM(s) Oral before breakfast  predniSONE   Tablet 5 milliGRAM(s) Oral daily  sevelamer carbonate 800 milliGRAM(s) Oral three times a day with meals  sevelamer hydrochloride Oral Tab/Cap - Peds 800 milliGRAM(s) Oral three times a day with meals  silver sulfADIAZINE 1% Cream 1 Application(s) Topical daily    MEDICATIONS  (PRN):  HYDROmorphone  Injectable 0.5 milliGRAM(s) IV Push every 4 hours PRN Severe Pain (7 - 10)  nitroglycerin     SubLingual 0.4 milliGRAM(s) SubLingual daily PRN Chest Pain  oxyCODONE    IR 5 milliGRAM(s) Oral every 4 hours PRN Moderate Pain (4 - 6)      Allergies    Zosyn (Rash)    Intolerances        Flatus: [ ] YES [ ] NO             Bowel Movement: [ ] YES [ ] NO  Pain (0-10):            Pain Control Adequate: [ ] YES [ ] NO  Nausea: [ ] YES [ ] NO            Vomiting: [ ] YES [ ] NO  Diarrhea: [ ] YES [ ] NO         Constipation: [ ] YES [ ] NO     Chest Pain: [ ] YES [ ] NO    SOB:  [ ] YES [ ] NO    Vital Signs Last 24 Hrs  T(C): 35.8 (03 Apr 2019 06:15), Max: 36.7 (02 Apr 2019 21:11)  T(F): 96.4 (03 Apr 2019 06:15), Max: 98 (02 Apr 2019 21:11)  HR: 75 (03 Apr 2019 04:00) (75 - 121)  BP: 120/38 (03 Apr 2019 04:00) (87/34 - 132/57)  BP(mean): 60 (03 Apr 2019 04:00) (45 - 77)  RR: 15 (03 Apr 2019 04:00) (7 - 22)  SpO2: 100% (03 Apr 2019 04:00) (91% - 100%)    I&O's Summary    02 Apr 2019 07:01  -  03 Apr 2019 07:00  --------------------------------------------------------  IN: 400 mL / OUT: 0 mL / NET: 400 mL        Physical Exam:  General: NAD, resting comfortably  Pulmonary: normal resp effort, CTA-B  Cardiovascular: NSR  Abdominal: soft, NT/ND  Extremities: WWP, normal strength  Neuro: A/O x 3, CNs II-XII grossly intact, normal motor/sensation, no focal deficits  Pulses:   Right:                                                                          Left:  FEM [ ]2+ [ ]1+ [ ]doppler                                             FEM [ ]2+ [ ]1+ [ ]doppler    POP [ ]2+ [ ]1+ [ ]doppler                                             POP [ ]2+ [ ]1+ [ ]doppler    DP [ ]2+ [ ]1+ [ ]doppler                                                DP [ ]2+ [ ]1+ [ ]doppler  PT[ ]2+ [ ]1+ [ ]doppler                                                  PT [ ]2+ [ ]1+ [ ]doppler    LABS:                        11.3   3.95  )-----------( 150      ( 02 Apr 2019 08:38 )             33.7     04-02    141  |  104  |  40<H>  ----------------------------<  85  5.2   |  26  |  4.03<H>    Ca    7.2<L>      02 Apr 2019 08:38            CAPILLARY BLOOD GLUCOSE      POCT Blood Glucose.: 103 mg/dL (02 Apr 2019 17:42)  POCT Blood Glucose.: 124 mg/dL (02 Apr 2019 12:15)      RADIOLOGY & ADDITIONAL TESTS:
stable  dialysis went yesterday without difficulty    for L femoral endarterectomy Monday    MEDICATIONS  (STANDING):  acetaminophen   Tablet .. 650 milliGRAM(s) Oral every 6 hours  allopurinol 100 milliGRAM(s) Oral daily  aspirin  chewable 81 milliGRAM(s) Oral daily  atorvastatin 20 milliGRAM(s) Oral at bedtime  buDESOnide   0.5 milliGRAM(s) Respule 0.5 milliGRAM(s) Inhalation two times a day  cholecalciferol 1000 Unit(s) Oral daily  cholestyramine Powder (Sugar-Free) 4 Gram(s) Oral daily  diltiazem    Tablet 60 milliGRAM(s) Oral four times a day  doxercalciferol Injectable 1 MICROGram(s) IV Push <User Schedule>  epoetin nelly Injectable 6000 Unit(s) IV Push <User Schedule>  finasteride 5 milliGRAM(s) Oral daily  fluticasone propionate (50 MICROgram(s)/actuation) Nasal Spray - Peds 1 Spray(s) Alternating Nostrils daily  heparin  Injectable 5000 Unit(s) SubCutaneous every 12 hours  lidocaine/prilocaine Cream 1 Application(s) Topical <User Schedule>  loratadine 10 milliGRAM(s) Oral daily  methotrexate 10 milliGRAM(s) Oral <User Schedule>  pantoprazole    Tablet 40 milliGRAM(s) Oral before breakfast  predniSONE   Tablet 5 milliGRAM(s) Oral daily  sevelamer hydrochloride Oral Tab/Cap - Peds 800 milliGRAM(s) Oral three times a day with meals  silver sulfADIAZINE 1% Cream 1 Application(s) Topical daily    MEDICATIONS  (PRN):  nitroglycerin     SubLingual 0.4 milliGRAM(s) SubLingual daily PRN Chest Pain      Allergies    Zosyn (Rash)    Intolerances        Flatus: [ ] YES [ ] NO             Bowel Movement: [ ] YES [ ] NO  Pain (0-10):            Pain Control Adequate: [ ] YES [ ] NO  Nausea: [ ] YES [ ] NO            Vomiting: [ ] YES [ ] NO  Diarrhea: [ ] YES [ ] NO         Constipation: [ ] YES [ ] NO     Chest Pain: [ ] YES [ ] NO    SOB:  [ ] YES [ ] NO    Vital Signs Last 24 Hrs  T(C): 36.5 (30 Mar 2019 05:46), Max: 36.8 (29 Mar 2019 12:27)  T(F): 97.7 (30 Mar 2019 05:46), Max: 98.2 (29 Mar 2019 12:27)  HR: 75 (30 Mar 2019 05:46) (70 - 93)  BP: 150/42 (30 Mar 2019 05:46) (128/53 - 159/57)  BP(mean): --  RR: 17 (30 Mar 2019 05:46) (16 - 18)  SpO2: 95% (30 Mar 2019 05:46) (93% - 95%)    I&O's Summary    29 Mar 2019 07:01  -  30 Mar 2019 07:00  --------------------------------------------------------  IN: 580 mL / OUT: 0 mL / NET: 580 mL        Physical Exam:  General: NAD, resting comfortably  Pulmonary: normal resp effort, CTA-B  Cardiovascular: NSR  Abdominal: soft, NT/ND  Extremities: WWP, normal strength  Neuro: A/O x 3, CNs II-XII grossly intact, normal motor/sensation, no focal deficits  Pulses:   Right:                                                                          Left:  FEM [ ]2+ [ ]1+ [ ]doppler                                             FEM [ ]2+ [ ]1+ [ ]doppler    POP [ ]2+ [ ]1+ [ ]doppler                                             POP [ ]2+ [ ]1+ [ ]doppler    DP [ ]2+ [ ]1+ [ ]doppler                                                DP [ ]2+ [ ]1+ [ ]doppler  PT[ ]2+ [ ]1+ [ ]doppler                                                  PT [ ]2+ [ ]1+ [ ]doppler    LABS:                        9.1    2.83  )-----------( 104      ( 29 Mar 2019 07:06 )             28.9     03-29    141  |  107  |  46<H>  ----------------------------<  74  4.4   |  23  |  4.74<H>    Ca    7.9<L>      29 Mar 2019 07:06  Phos  3.6     03-29            CAPILLARY BLOOD GLUCOSE          RADIOLOGY & ADDITIONAL TESTS:
status post L CFA endarterectomy and bovine pericardial patch with significant intra operative blood loss but acceptable post operative HCT and lytes    states his L foot feels better    L groin incision without hematoma  L foot with intact sensory and motor    A/P  stable status post endarterectomy    continue bedrest    check HCT and lytes    MEDICATIONS  (STANDING):  acetaminophen   Tablet .. 650 milliGRAM(s) Oral every 6 hours  allopurinol 100 milliGRAM(s) Oral daily  aspirin  chewable 81 milliGRAM(s) Oral daily  atorvastatin 20 milliGRAM(s) Oral at bedtime  buDESOnide   0.5 milliGRAM(s) Respule 0.5 milliGRAM(s) Inhalation two times a day  cholecalciferol 1000 Unit(s) Oral daily  cholestyramine Powder (Sugar-Free) 4 Gram(s) Oral daily  diltiazem    Tablet 60 milliGRAM(s) Oral four times a day  doxercalciferol Injectable 1 MICROGram(s) IV Push <User Schedule>  epoetin nelly Injectable 6000 Unit(s) IV Push <User Schedule>  finasteride 5 milliGRAM(s) Oral daily  fluticasone propionate (50 MICROgram(s)/actuation) Nasal Spray - Peds 1 Spray(s) Alternating Nostrils daily  heparin  Injectable 5000 Unit(s) SubCutaneous every 12 hours  lidocaine/prilocaine Cream 1 Application(s) Topical <User Schedule>  loratadine 10 milliGRAM(s) Oral daily  methotrexate 10 milliGRAM(s) Oral <User Schedule>  pantoprazole    Tablet 40 milliGRAM(s) Oral before breakfast  predniSONE   Tablet 5 milliGRAM(s) Oral daily  sevelamer carbonate 800 milliGRAM(s) Oral three times a day with meals  sevelamer hydrochloride Oral Tab/Cap - Peds 800 milliGRAM(s) Oral three times a day with meals  silver sulfADIAZINE 1% Cream 1 Application(s) Topical daily    MEDICATIONS  (PRN):  HYDROmorphone  Injectable 0.5 milliGRAM(s) IV Push every 4 hours PRN Severe Pain (7 - 10)  nitroglycerin     SubLingual 0.4 milliGRAM(s) SubLingual daily PRN Chest Pain  oxyCODONE    IR 5 milliGRAM(s) Oral every 4 hours PRN Moderate Pain (4 - 6)      Allergies    Zosyn (Rash)    Intolerances        Flatus: [ ] YES [ ] NO             Bowel Movement: [ ] YES [ ] NO  Pain (0-10):            Pain Control Adequate: [ ] YES [ ] NO  Nausea: [ ] YES [ ] NO            Vomiting: [ ] YES [ ] NO  Diarrhea: [ ] YES [ ] NO         Constipation: [ ] YES [ ] NO     Chest Pain: [ ] YES [ ] NO    SOB:  [ ] YES [ ] NO    Vital Signs Last 24 Hrs  T(C): 36.8 (02 Apr 2019 05:00), Max: 37.6 (01 Apr 2019 11:24)  T(F): 98.3 (02 Apr 2019 05:00), Max: 99.6 (01 Apr 2019 11:24)  HR: 105 (02 Apr 2019 07:00) (80 - 117)  BP: 108/43 (02 Apr 2019 07:00) (87/36 - 155/46)  BP(mean): 59 (02 Apr 2019 07:00) (54 - 65)  RR: 18 (02 Apr 2019 07:00) (11 - 27)  SpO2: 97% (02 Apr 2019 07:00) (92% - 100%)    I&O's Summary    01 Apr 2019 07:01  -  02 Apr 2019 07:00  --------------------------------------------------------  IN: 3158 mL / OUT: 2000 mL / NET: 1158 mL        Physical Exam:  General: NAD, resting comfortably  Pulmonary: normal resp effort, CTA-B  Cardiovascular: NSR  Abdominal: soft, NT/ND  Extremities: WWP, normal strength  Neuro: A/O x 3, CNs II-XII grossly intact, normal motor/sensation, no focal deficits  Pulses:   Right:                                                                          Left:  FEM [ ]2+ [ ]1+ [ ]doppler                                             FEM [ ]2+ [ ]1+ [ ]doppler    POP [ ]2+ [ ]1+ [ ]doppler                                             POP [ ]2+ [ ]1+ [ ]doppler    DP [ ]2+ [ ]1+ [ ]doppler                                                DP [ ]2+ [ ]1+ [ ]doppler  PT[ ]2+ [ ]1+ [ ]doppler                                                  PT [ ]2+ [ ]1+ [ ]doppler    LABS:                        12.1   5.94  )-----------( 162      ( 01 Apr 2019 17:55 )             36.1     04-01    140  |  104  |  24<H>  ----------------------------<  151<H>  4.9   |  24  |  3.15<H>    Ca    7.6<L>      01 Apr 2019 17:55  Phos  5.6     04-01    TPro  x   /  Alb  2.6<L>  /  TBili  x   /  DBili  x   /  AST  x   /  ALT  x   /  AlkPhos  x   04-01        LIVER FUNCTIONS - ( 01 Apr 2019 07:30 )  Alb: 2.6 g/dL / Pro: x     / ALK PHOS: x     / ALT: x     / AST: x     / GGT: x           CAPILLARY BLOOD GLUCOSE          RADIOLOGY & ADDITIONAL TESTS:
status post angioplasty of R innominate and subclavian veins.  Edema of R hand better.    no puncture site hematoma    to use fistula today for dialysis    femoral endarterectomy Monday    wound care written for L leg    Dr. Correia to see for medical management    MEDICATIONS  (STANDING):  acetaminophen   Tablet .. 650 milliGRAM(s) Oral every 6 hours  allopurinol 100 milliGRAM(s) Oral daily  aspirin  chewable 81 milliGRAM(s) Oral daily  atorvastatin 20 milliGRAM(s) Oral at bedtime  buDESOnide   0.5 milliGRAM(s) Respule 0.5 milliGRAM(s) Inhalation two times a day  cholecalciferol 1000 Unit(s) Oral daily  cholestyramine Powder (Sugar-Free) 4 Gram(s) Oral daily  diltiazem    Tablet 60 milliGRAM(s) Oral four times a day  finasteride 5 milliGRAM(s) Oral daily  fluticasone propionate (50 MICROgram(s)/actuation) Nasal Spray - Peds 1 Spray(s) Alternating Nostrils daily  lidocaine/prilocaine Cream 1 Application(s) Topical <User Schedule>  loratadine 10 milliGRAM(s) Oral daily  methotrexate 10 milliGRAM(s) Oral <User Schedule>  pantoprazole    Tablet 40 milliGRAM(s) Oral before breakfast  predniSONE   Tablet 5 milliGRAM(s) Oral daily  sevelamer hydrochloride Oral Tab/Cap - Peds 800 milliGRAM(s) Oral three times a day with meals  silver sulfADIAZINE 1% Cream 1 Application(s) Topical daily    MEDICATIONS  (PRN):  nitroglycerin     SubLingual 0.4 milliGRAM(s) SubLingual daily PRN Chest Pain      Allergies    Zosyn (Rash)    Intolerances        Flatus: [ ] YES [ ] NO             Bowel Movement: [ ] YES [ ] NO  Pain (0-10):            Pain Control Adequate: [ ] YES [ ] NO  Nausea: [ ] YES [ ] NO            Vomiting: [ ] YES [ ] NO  Diarrhea: [ ] YES [ ] NO         Constipation: [ ] YES [ ] NO     Chest Pain: [ ] YES [ ] NO    SOB:  [ ] YES [ ] NO    Vital Signs Last 24 Hrs  T(C): 36.4 (29 Mar 2019 05:42), Max: 36.7 (28 Mar 2019 23:21)  T(F): 97.5 (29 Mar 2019 05:42), Max: 98.1 (28 Mar 2019 23:21)  HR: 71 (29 Mar 2019 05:42) (71 - 88)  BP: 139/46 (29 Mar 2019 05:42) (129/42 - 156/46)  BP(mean): --  RR: 18 (29 Mar 2019 05:42) (12 - 18)  SpO2: 96% (29 Mar 2019 05:42) (96% - 98%)    I&O's Summary    28 Mar 2019 07:01  -  29 Mar 2019 06:58  --------------------------------------------------------  IN: 700 mL / OUT: 0 mL / NET: 700 mL        Physical Exam:  General: NAD, resting comfortably  Pulmonary: normal resp effort, CTA-B  Cardiovascular: NSR  Abdominal: soft, NT/ND  Extremities: WWP, normal strength  Neuro: A/O x 3, CNs II-XII grossly intact, normal motor/sensation, no focal deficits  Pulses:   Right:                                                                          Left:  FEM [ ]2+ [ ]1+ [ ]doppler                                             FEM [ ]2+ [ ]1+ [ ]doppler    POP [ ]2+ [ ]1+ [ ]doppler                                             POP [ ]2+ [ ]1+ [ ]doppler    DP [ ]2+ [ ]1+ [ ]doppler                                                DP [ ]2+ [ ]1+ [ ]doppler  PT[ ]2+ [ ]1+ [ ]doppler                                                  PT [ ]2+ [ ]1+ [ ]doppler    LABS:                        10.2   2.89  )-----------( Clumped    ( 28 Mar 2019 06:43 )             31.7     03-28    141  |  107  |  28<H>  ----------------------------<  89  4.0   |  28  |  3.63<H>    Ca    8.5      28 Mar 2019 06:43      PT/INR - ( 28 Mar 2019 06:43 )   PT: 12.1 sec;   INR: 1.09 ratio         PTT - ( 28 Mar 2019 06:43 )  PTT:31.4 sec      CAPILLARY BLOOD GLUCOSE          RADIOLOGY & ADDITIONAL TESTS:

## 2019-04-11 NOTE — DISCHARGE NOTE NURSING/CASE MANAGEMENT/SOCIAL WORK - NSDCPNDISPN_GEN_ALL_CORE
Safe use, storage and disposal of opioids when prescribed/Activities of daily living, including home environment that might     exacerbate pain or reduce effectiveness of the pain management plan of care as well as strategies to address these issues/Side effects of pain management treatment/Opioids not applicable/not prescribed/Education provided on the pain management plan of care

## 2019-04-16 DIAGNOSIS — E11.22 TYPE 2 DIABETES MELLITUS WITH DIABETIC CHRONIC KIDNEY DISEASE: ICD-10-CM

## 2019-04-16 DIAGNOSIS — J44.9 CHRONIC OBSTRUCTIVE PULMONARY DISEASE, UNSPECIFIED: ICD-10-CM

## 2019-04-16 DIAGNOSIS — I97.418 INTRAOPERATIVE HEMORRHAGE AND HEMATOMA OF A CIRCULATORY SYSTEM ORGAN OR STRUCTURE COMPLICATING OTHER CIRCULATORY SYSTEM PROCEDURE: ICD-10-CM

## 2019-04-16 DIAGNOSIS — E78.5 HYPERLIPIDEMIA, UNSPECIFIED: ICD-10-CM

## 2019-04-16 DIAGNOSIS — Z87.891 PERSONAL HISTORY OF NICOTINE DEPENDENCE: ICD-10-CM

## 2019-04-16 DIAGNOSIS — Z79.82 LONG TERM (CURRENT) USE OF ASPIRIN: ICD-10-CM

## 2019-04-16 DIAGNOSIS — D63.1 ANEMIA IN CHRONIC KIDNEY DISEASE: ICD-10-CM

## 2019-04-16 DIAGNOSIS — N18.6 END STAGE RENAL DISEASE: ICD-10-CM

## 2019-04-16 DIAGNOSIS — I48.91 UNSPECIFIED ATRIAL FIBRILLATION: ICD-10-CM

## 2019-04-16 DIAGNOSIS — I50.32 CHRONIC DIASTOLIC (CONGESTIVE) HEART FAILURE: ICD-10-CM

## 2019-04-16 DIAGNOSIS — I82.B11 ACUTE EMBOLISM AND THROMBOSIS OF RIGHT SUBCLAVIAN VEIN: ICD-10-CM

## 2019-04-16 DIAGNOSIS — Z99.2 DEPENDENCE ON RENAL DIALYSIS: ICD-10-CM

## 2019-04-16 DIAGNOSIS — Z95.5 PRESENCE OF CORONARY ANGIOPLASTY IMPLANT AND GRAFT: ICD-10-CM

## 2019-04-16 DIAGNOSIS — I87.1 COMPRESSION OF VEIN: ICD-10-CM
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
## 2019-04-16 DIAGNOSIS — I25.10 ATHEROSCLEROTIC HEART DISEASE OF NATIVE CORONARY ARTERY WITHOUT ANGINA PECTORIS: ICD-10-CM

## 2019-04-16 DIAGNOSIS — I13.2 HYPERTENSIVE HEART AND CHRONIC KIDNEY DISEASE WITH HEART FAILURE AND WITH STAGE 5 CHRONIC KIDNEY DISEASE, OR END STAGE RENAL DISEASE: ICD-10-CM

## 2019-04-16 DIAGNOSIS — I73.9 PERIPHERAL VASCULAR DISEASE, UNSPECIFIED: ICD-10-CM

## 2019-04-16 DIAGNOSIS — Z79.01 LONG TERM (CURRENT) USE OF ANTICOAGULANTS: ICD-10-CM

## 2019-04-19 ENCOUNTER — INPATIENT (INPATIENT)
Facility: HOSPITAL | Age: 84
LOS: 25 days | Discharge: NURSING, REHAB OR LONG-TERM CARE | End: 2019-05-15
Attending: HOSPITALIST | Admitting: HOSPITALIST
Payer: MEDICARE

## 2019-04-19 VITALS
WEIGHT: 151.02 LBS | TEMPERATURE: 98 F | DIASTOLIC BLOOD PRESSURE: 50 MMHG | HEIGHT: 67 IN | OXYGEN SATURATION: 96 % | RESPIRATION RATE: 18 BRPM | SYSTOLIC BLOOD PRESSURE: 100 MMHG | HEART RATE: 90 BPM

## 2019-04-19 DIAGNOSIS — Z95.9 PRESENCE OF CARDIAC AND VASCULAR IMPLANT AND GRAFT, UNSPECIFIED: Chronic | ICD-10-CM

## 2019-04-19 LAB
ACANTHOCYTES BLD QL SMEAR: SLIGHT — SIGNIFICANT CHANGE UP
ADD ON TEST-SPECIMEN IN LAB: SIGNIFICANT CHANGE UP
ALBUMIN SERPL ELPH-MCNC: 2.3 G/DL — LOW (ref 3.3–5)
ALP SERPL-CCNC: 85 U/L — SIGNIFICANT CHANGE UP (ref 40–120)
ALT FLD-CCNC: 40 U/L — SIGNIFICANT CHANGE UP (ref 12–78)
ANION GAP SERPL CALC-SCNC: 12 MMOL/L — SIGNIFICANT CHANGE UP (ref 5–17)
ANISOCYTOSIS BLD QL: SLIGHT — SIGNIFICANT CHANGE UP
APTT BLD: 35.7 SEC — SIGNIFICANT CHANGE UP (ref 27.5–36.3)
AST SERPL-CCNC: 25 U/L — SIGNIFICANT CHANGE UP (ref 15–37)
BASO STIPL BLD QL SMEAR: PRESENT — SIGNIFICANT CHANGE UP
BASOPHILS # BLD AUTO: 0.01 K/UL — SIGNIFICANT CHANGE UP (ref 0–0.2)
BASOPHILS NFR BLD AUTO: 0.5 % — SIGNIFICANT CHANGE UP (ref 0–2)
BILIRUB SERPL-MCNC: 0.5 MG/DL — SIGNIFICANT CHANGE UP (ref 0.2–1.2)
BIZARRE PLATELETS BLD QL SMEAR: PRESENT — SIGNIFICANT CHANGE UP
BLD GP AB SCN SERPL QL: SIGNIFICANT CHANGE UP
BUN SERPL-MCNC: 51 MG/DL — HIGH (ref 7–23)
BURR CELLS BLD QL SMEAR: PRESENT — SIGNIFICANT CHANGE UP
CALCIUM SERPL-MCNC: 7.6 MG/DL — LOW (ref 8.5–10.1)
CHLORIDE SERPL-SCNC: 104 MMOL/L — SIGNIFICANT CHANGE UP (ref 96–108)
CO2 SERPL-SCNC: 23 MMOL/L — SIGNIFICANT CHANGE UP (ref 22–31)
CREAT SERPL-MCNC: 4.27 MG/DL — HIGH (ref 0.5–1.3)
DACRYOCYTES BLD QL SMEAR: SLIGHT — SIGNIFICANT CHANGE UP
ELLIPTOCYTES BLD QL SMEAR: SLIGHT — SIGNIFICANT CHANGE UP
EOSINOPHIL # BLD AUTO: 0.05 K/UL — SIGNIFICANT CHANGE UP (ref 0–0.5)
EOSINOPHIL NFR BLD AUTO: 2.6 % — SIGNIFICANT CHANGE UP (ref 0–6)
GLUCOSE SERPL-MCNC: 107 MG/DL — HIGH (ref 70–99)
HAV IGM SER-ACNC: SIGNIFICANT CHANGE UP
HBV CORE IGM SER-ACNC: SIGNIFICANT CHANGE UP
HBV SURFACE AG SER-ACNC: SIGNIFICANT CHANGE UP
HCT VFR BLD CALC: 23.1 % — LOW (ref 39–50)
HCV AB S/CO SERPL IA: 0.05 S/CO — SIGNIFICANT CHANGE UP (ref 0–0.99)
HCV AB SERPL-IMP: SIGNIFICANT CHANGE UP
HGB BLD-MCNC: 7.4 G/DL — LOW (ref 13–17)
IMM GRANULOCYTES NFR BLD AUTO: 0.5 % — SIGNIFICANT CHANGE UP (ref 0–1.5)
INR BLD: 3.46 RATIO — HIGH (ref 0.88–1.16)
LACTATE SERPL-SCNC: 1.7 MMOL/L — SIGNIFICANT CHANGE UP (ref 0.7–2)
LYMPHOCYTES # BLD AUTO: 0.27 K/UL — LOW (ref 1–3.3)
LYMPHOCYTES # BLD AUTO: 14.2 % — SIGNIFICANT CHANGE UP (ref 13–44)
MACROCYTES BLD QL: SLIGHT — SIGNIFICANT CHANGE UP
MANUAL SMEAR VERIFICATION: SIGNIFICANT CHANGE UP
MCHC RBC-ENTMCNC: 32 GM/DL — SIGNIFICANT CHANGE UP (ref 32–36)
MCHC RBC-ENTMCNC: 33 PG — SIGNIFICANT CHANGE UP (ref 27–34)
MCV RBC AUTO: 103.1 FL — HIGH (ref 80–100)
MONOCYTES # BLD AUTO: 0.04 K/UL — SIGNIFICANT CHANGE UP (ref 0–0.9)
MONOCYTES NFR BLD AUTO: 2.1 % — SIGNIFICANT CHANGE UP (ref 2–14)
NEUTROPHILS # BLD AUTO: 1.52 K/UL — LOW (ref 1.8–7.4)
NEUTROPHILS NFR BLD AUTO: 80.1 % — HIGH (ref 43–77)
NRBC # BLD: 0 /100 WBCS — SIGNIFICANT CHANGE UP (ref 0–0)
OVALOCYTES BLD QL SMEAR: SLIGHT — SIGNIFICANT CHANGE UP
PHOSPHATE SERPL-MCNC: 3.7 MG/DL — SIGNIFICANT CHANGE UP (ref 2.5–4.5)
PLAT MORPH BLD: NORMAL — SIGNIFICANT CHANGE UP
PLATELET # BLD AUTO: 65 K/UL — LOW (ref 150–400)
POIKILOCYTOSIS BLD QL AUTO: SLIGHT — SIGNIFICANT CHANGE UP
POTASSIUM SERPL-MCNC: 4.6 MMOL/L — SIGNIFICANT CHANGE UP (ref 3.5–5.3)
POTASSIUM SERPL-SCNC: 4.6 MMOL/L — SIGNIFICANT CHANGE UP (ref 3.5–5.3)
PROT SERPL-MCNC: 5.5 GM/DL — LOW (ref 6–8.3)
PROTHROM AB SERPL-ACNC: 39.9 SEC — HIGH (ref 10–12.9)
RBC # BLD: 2.24 M/UL — LOW (ref 4.2–5.8)
RBC # FLD: 19.2 % — HIGH (ref 10.3–14.5)
RBC BLD AUTO: ABNORMAL
SODIUM SERPL-SCNC: 139 MMOL/L — SIGNIFICANT CHANGE UP (ref 135–145)
TYPE + AB SCN PNL BLD: SIGNIFICANT CHANGE UP
WBC # BLD: 1.9 K/UL — LOW (ref 3.8–10.5)
WBC # FLD AUTO: 1.9 K/UL — LOW (ref 3.8–10.5)

## 2019-04-19 PROCEDURE — 99285 EMERGENCY DEPT VISIT HI MDM: CPT

## 2019-04-19 PROCEDURE — 99221 1ST HOSP IP/OBS SF/LOW 40: CPT

## 2019-04-19 PROCEDURE — 71045 X-RAY EXAM CHEST 1 VIEW: CPT | Mod: 26

## 2019-04-19 PROCEDURE — 93010 ELECTROCARDIOGRAM REPORT: CPT

## 2019-04-19 PROCEDURE — 93971 EXTREMITY STUDY: CPT | Mod: 26,RT

## 2019-04-19 PROCEDURE — 93926 LOWER EXTREMITY STUDY: CPT | Mod: 26,LT

## 2019-04-19 RX ORDER — DILTIAZEM HCL 120 MG
1 CAPSULE, EXT RELEASE 24 HR ORAL
Qty: 0 | Refills: 0 | COMMUNITY

## 2019-04-19 RX ORDER — AZTREONAM 2 G
2000 VIAL (EA) INJECTION ONCE
Qty: 0 | Refills: 0 | Status: COMPLETED | OUTPATIENT
Start: 2019-04-19 | End: 2019-04-19

## 2019-04-19 RX ORDER — ATORVASTATIN CALCIUM 80 MG/1
20 TABLET, FILM COATED ORAL AT BEDTIME
Qty: 0 | Refills: 0 | Status: DISCONTINUED | OUTPATIENT
Start: 2019-04-19 | End: 2019-05-06

## 2019-04-19 RX ORDER — VANCOMYCIN HCL 1 G
1000 VIAL (EA) INTRAVENOUS ONCE
Qty: 0 | Refills: 0 | Status: COMPLETED | OUTPATIENT
Start: 2019-04-19 | End: 2019-04-19

## 2019-04-19 RX ORDER — SEVELAMER CARBONATE 2400 MG/1
800 POWDER, FOR SUSPENSION ORAL
Qty: 0 | Refills: 0 | Status: DISCONTINUED | OUTPATIENT
Start: 2019-04-19 | End: 2019-05-06

## 2019-04-19 RX ORDER — ACETAMINOPHEN 500 MG
650 TABLET ORAL EVERY 6 HOURS
Qty: 0 | Refills: 0 | Status: DISCONTINUED | OUTPATIENT
Start: 2019-04-19 | End: 2019-04-22

## 2019-04-19 RX ORDER — METHOTREXATE 2.5 MG/1
10 TABLET ORAL
Qty: 0 | Refills: 0 | Status: DISCONTINUED | OUTPATIENT
Start: 2019-04-20 | End: 2019-04-20

## 2019-04-19 RX ORDER — COLLAGENASE CLOSTRIDIUM HIST. 250 UNIT/G
1 OINTMENT (GRAM) TOPICAL
Qty: 0 | Refills: 0 | Status: DISCONTINUED | OUTPATIENT
Start: 2019-04-19 | End: 2019-04-23

## 2019-04-19 RX ORDER — SODIUM CHLORIDE 9 MG/ML
2100 INJECTION, SOLUTION INTRAVENOUS ONCE
Qty: 0 | Refills: 0 | Status: COMPLETED | OUTPATIENT
Start: 2019-04-19 | End: 2019-04-19

## 2019-04-19 RX ORDER — PANTOPRAZOLE SODIUM 20 MG/1
40 TABLET, DELAYED RELEASE ORAL
Qty: 0 | Refills: 0 | Status: DISCONTINUED | OUTPATIENT
Start: 2019-04-19 | End: 2019-05-06

## 2019-04-19 RX ORDER — FINASTERIDE 5 MG/1
5 TABLET, FILM COATED ORAL DAILY
Qty: 0 | Refills: 0 | Status: DISCONTINUED | OUTPATIENT
Start: 2019-04-19 | End: 2019-05-06

## 2019-04-19 RX ORDER — ASPIRIN/CALCIUM CARB/MAGNESIUM 324 MG
81 TABLET ORAL DAILY
Qty: 0 | Refills: 0 | Status: DISCONTINUED | OUTPATIENT
Start: 2019-04-19 | End: 2019-05-03

## 2019-04-19 RX ORDER — DILTIAZEM HCL 120 MG
60 CAPSULE, EXT RELEASE 24 HR ORAL
Qty: 0 | Refills: 0 | Status: DISCONTINUED | OUTPATIENT
Start: 2019-04-19 | End: 2019-05-06

## 2019-04-19 RX ORDER — DOXERCALCIFEROL 2.5 UG/1
1 CAPSULE ORAL
Qty: 0 | Refills: 0 | Status: DISCONTINUED | OUTPATIENT
Start: 2019-04-19 | End: 2019-05-06

## 2019-04-19 RX ORDER — CHOLESTYRAMINE 4 G/9G
4 POWDER, FOR SUSPENSION ORAL DAILY
Qty: 0 | Refills: 0 | Status: DISCONTINUED | OUTPATIENT
Start: 2019-04-19 | End: 2019-05-06

## 2019-04-19 RX ORDER — ALLOPURINOL 300 MG
100 TABLET ORAL
Qty: 0 | Refills: 0 | Status: DISCONTINUED | OUTPATIENT
Start: 2019-04-19 | End: 2019-04-26

## 2019-04-19 RX ORDER — AZTREONAM 2 G
250 VIAL (EA) INJECTION EVERY 12 HOURS
Qty: 0 | Refills: 0 | Status: DISCONTINUED | OUTPATIENT
Start: 2019-04-19 | End: 2019-04-20

## 2019-04-19 RX ORDER — LIDOCAINE AND PRILOCAINE CREAM 25; 25 MG/G; MG/G
1 CREAM TOPICAL DAILY
Qty: 0 | Refills: 0 | Status: DISCONTINUED | OUTPATIENT
Start: 2019-04-19 | End: 2019-05-06

## 2019-04-19 RX ORDER — ERYTHROPOIETIN 10000 [IU]/ML
10000 INJECTION, SOLUTION INTRAVENOUS; SUBCUTANEOUS
Qty: 0 | Refills: 0 | Status: DISCONTINUED | OUTPATIENT
Start: 2019-04-19 | End: 2019-05-06

## 2019-04-19 RX ADMIN — Medication 650 MILLIGRAM(S): at 23:27

## 2019-04-19 RX ADMIN — Medication 100 MILLIGRAM(S): at 12:15

## 2019-04-19 RX ADMIN — Medication 250 MILLIGRAM(S): at 11:14

## 2019-04-19 RX ADMIN — Medication 50 MILLIGRAM(S): at 22:55

## 2019-04-19 RX ADMIN — Medication 650 MILLIGRAM(S): at 15:28

## 2019-04-19 RX ADMIN — SEVELAMER CARBONATE 800 MILLIGRAM(S): 2400 POWDER, FOR SUSPENSION ORAL at 22:54

## 2019-04-19 RX ADMIN — ATORVASTATIN CALCIUM 20 MILLIGRAM(S): 80 TABLET, FILM COATED ORAL at 22:53

## 2019-04-19 RX ADMIN — Medication 1000 MILLIGRAM(S): at 12:14

## 2019-04-19 RX ADMIN — SODIUM CHLORIDE 2100 MILLILITER(S): 9 INJECTION, SOLUTION INTRAVENOUS at 11:22

## 2019-04-19 RX ADMIN — ERYTHROPOIETIN 10000 UNIT(S): 10000 INJECTION, SOLUTION INTRAVENOUS; SUBCUTANEOUS at 16:27

## 2019-04-19 RX ADMIN — DOXERCALCIFEROL 1 MICROGRAM(S): 2.5 CAPSULE ORAL at 16:26

## 2019-04-19 RX ADMIN — Medication 650 MILLIGRAM(S): at 16:18

## 2019-04-19 RX ADMIN — SODIUM CHLORIDE 2100 MILLILITER(S): 9 INJECTION, SOLUTION INTRAVENOUS at 11:14

## 2019-04-19 RX ADMIN — Medication 60 MILLIGRAM(S): at 23:11

## 2019-04-19 NOTE — H&P ADULT - NSHPPHYSICALEXAM_GEN_ALL_CORE
Vital Signs Last 24 Hrs  T(C): 37 (19 Apr 2019 16:15), Max: 37.2 (19 Apr 2019 14:57)  T(F): 98.6 (19 Apr 2019 16:15), Max: 98.9 (19 Apr 2019 14:57)  HR: 84 (19 Apr 2019 16:15) (83 - 91)  BP: 149/40 (19 Apr 2019 16:15) (100/50 - 167/60)  BP(mean): --  RR: 17 (19 Apr 2019 16:15) (17 - 18)  SpO2: 96% (19 Apr 2019 09:59) (96% - 96%)

## 2019-04-19 NOTE — PATIENT PROFILE ADULT - LAST ORAL INTAKE
Routine blood pressure check.  Continue with current medications.   Low salt,low cholesterol, DASH diet
19-Apr-2019 23:14
(4) rarely moist

## 2019-04-19 NOTE — ED PROVIDER NOTE - OBJECTIVE STATEMENT
85 y/o male with PMHx of afib, anemia, BPH, CAD, CKD on HD MWF, COPD, CRI, CHF, GERD, Gout, HLD, HTN, PVD presents to the ED from [facility] for fever of 100.5 and change in color of drainage from left groin s/p cath starting this morning. Pt also with swelling and pain to right arm. Pt was d/c 8 days ago s/p fistula revision and catheterization with Dr. Mccracken. Pt took Tylenol PTA. Denies NVD. Former smoker. Allergic to zosyn.

## 2019-04-19 NOTE — CHART NOTE - NSCHARTNOTEFT_GEN_A_CORE
Called to see pt for continued bleeding from arm after Hemodialysis.  Pressure was held by RN and upon our arrival all bleeding had stopped.  Area is covered with sterile dressing and is dry.  Dr. Clement aware please recall as needed.

## 2019-04-19 NOTE — H&P ADULT - ASSESSMENT
IMP:    85 y/o male with ESRD on HD (MWF), HTN, RA on MTX and steroids, R leg amputation, severe CDI and megacolon, Severe PAD s/p LLE endarterectomy 2 weeks ago with dr dugan admitted with post op infection/sepsis    Plan:    Admit to med surg    Will cont with aztreonam--ID eval as pt will need PO vanco given h/o severe CDI  Cont with outpt meds--no need for stress dose steroids at this time given hemodynamic stability   MTX for RA  RRT as per renal  DVT prophy--cont with coumadin--dose coumadin daily with INR    Above d/w pt and daughter at bedside-- All concerns addressed

## 2019-04-19 NOTE — ED PROVIDER NOTE - DATE/TIME 1
MARII Vuong: pt resting comfortably in bed NAD, pt states she feels well.  Pt awaiting Endocrine consult.  Will continue to monitor. 19-Apr-2019 10:24

## 2019-04-19 NOTE — ED PROVIDER NOTE - NS_ ATTENDINGSCRIBEDETAILS _ED_A_ED_FT
I, Devante Mccall MD,  performed the initial face to face bedside interview with this patient regarding history of present illness, review of symptoms and relevant past medical, social and family history.  I completed an independent physical examination.  I was the initial provider who evaluated this patient.  The history, relevant review of systems, past medical and surgical history, medical decision making, and physical examination was documented by the scribe in my presence and I attest to the accuracy of the documentation.

## 2019-04-19 NOTE — ED PROVIDER NOTE - NS ED ROS FT
Constitutional: No fever or chills  Eyes: No visual changes  HEENT: No throat pain  CV: No chest pain  Resp: No SOB no cough  GI: No abd pain, nausea or vomiting  : No dysuria  MSK: +left groin pain +right arm pain and swelling  Skin: No rash  Neuro: No headache Constitutional: + fever and chills  Eyes: No visual changes  HEENT: No throat pain  CV: No chest pain  Resp: No SOB no cough  GI: No abd pain, nausea or vomiting  : No dysuria  MSK: +left groin pain +right arm pain and swelling  Skin: No rash  Neuro: No headache

## 2019-04-19 NOTE — CONSULT NOTE ADULT - SUBJECTIVE AND OBJECTIVE BOX
83 y/o male with PMHx of afib, anemia, BPH, CAD, CKD on HD MWF, COPD, CRI, CHF, GERD, Gout, HLD, HTN, PVDpresents to the ED from [facility] after having 100.5F temp this morning and wound care nurse noticing drainage from surgical wound site in left groin. The patient is accompanied by his daughter who is at bedside. The patient was discharged 8 days ago after a left femoral endarterectomy and right arm fistula revision with Dr. Clement. 83 y/o male with PMHx of afib, anemia, BPH, CAD, CKD on HD MWF, COPD, CRI, CHF, GERD, Gout, HLD, HTN, PVD presents to the ED from [facility] after having 100.5F temp this morning and wound care nurse noticing drainage from surgical wound site in left groin. The patient is accompanied by his daughter who is at bedside. The patient was discharged 8 days ago after a left femoral endarterectomy and right arm fistula revision with Dr. Clement. Pt states he has some pain in LLE wounds, otherwise denies fevers, chills, nausea, vomiting, cp, sob.    PMH: afib, anemia, BPH, CAD, CKD on HD MWF, COPD, CRI, CHF, GERD, Gout, HLD, HTN, PVD    PAST MEDICAL & SURGICAL HISTORY:  Recurrent Clostridium difficile diarrhea  Diastolic CHF  Peripheral vascular disease  Afib  Anemia  CKD (chronic kidney disease)  COPD (chronic obstructive pulmonary disease)  SHAYY (obstructive sleep apnea)  Sepsis, due to unspecified organism: 2/2 poorly healing wounds b/l  Dyspepsia: On moderate exertion.  Sleep apnea, obstructive: Requires home 02 therapy, and treatment with BIPAP  Atelectasis  Pleural effusion, bilateral  Respiratory failure  Peripheral edema  CRI (chronic renal insufficiency)  Gout  Benign prostatic hypertrophy  Spinal stenosis  Hypercholesterolemia  GERD (gastroesophageal reflux disease)  CAD (coronary artery disease)  Hypertension  S/P angioplasty with stent  Cataract of left eye  Prostate: Surgery green light procedure.  S/P rotator cuff surgery: Right  S/P angioplasty    Home Medications:  acetaminophen 325 mg oral tablet: 2 tab(s) orally every 6 hours, As needed, Temp greater or equal to 38C (100.4F), Mild Pain (1 - 3) (19 Apr 2019 12:26)  allopurinol 100 mg oral tablet: 1 tab(s) orally 4 times a week  **Tuesday, Thursday, Saturday, and Sunday*** (19 Apr 2019 12:26)  atorvastatin 20 mg oral tablet: 1 tab(s) orally once a day (19 Apr 2019 12:26)  cholestyramine 4 g/5 g oral powder for reconstitution: 1 packet(s) orally once a day (19 Apr 2019 12:26)  Claritin 10 mg oral tablet: 1 tab(s) orally once a day (19 Apr 2019 12:26)  dilTIAZem 60 mg oral tablet: 1 tab(s) orally 4 times a day (19 Apr 2019 12:26)  Emla 2.5%-2.5% topical cream: Apply topically to affected area befor dialysis (19 Apr 2019 12:26)  Flonase 50 mcg/inh nasal spray: 2 spray(s) in each nostril once a day (19 Apr 2019 12:26)  methotrexate 2.5 mg oral tablet: 4 tab(s) orally once a week on Saturday night (19 Apr 2019 12:26)  nitroglycerin: 0.4 milligram(s) orally , As Needed (19 Apr 2019 12:26)  predniSONE 2.5 mg oral tablet: 1 tab(s) orally once a day  ***starts on the 25th of April*** (19 Apr 2019 12:26)  predniSONE 5 mg oral tablet: 1 tab(s) orally once a day  ***Alternating with 2.5mg*** (19 Apr 2019 12:26)  Santyl 250 units/g topical ointment: Apply topically to affected area as directed (19 Apr 2019 12:26)  warfarin 3 mg oral tablet: 1 tab(s) orally once a day (19 Apr 2019 12:26)    Allergies    Zosyn (Rash)    Intolerances    Social Hx: pt was a previous smoker, lives in an assisted living facility    ROS: all systems reviewed, as stated above    ICU Vital Signs Last 24 Hrs  T(C): 36.5 (19 Apr 2019 09:59), Max: 36.5 (19 Apr 2019 09:59)  T(F): 97.7 (19 Apr 2019 09:59), Max: 97.7 (19 Apr 2019 09:59)  HR: 90 (19 Apr 2019 09:59) (90 - 90)  BP: 100/50 (19 Apr 2019 09:59) (100/50 - 100/50)  BP(mean): --  ABP: --  ABP(mean): --  RR: 18 (19 Apr 2019 09:59) (18 - 18)  SpO2: 96% (19 Apr 2019 09:59) (96% - 96%)    A&Ox3, NAD, lying comfortably in bed with daughter at bedside  HEENT: normocephalic, atraumatic, EOMI, PERLLA  Cardiac: normal s1s2, RRR  Pulm: CTA bl  Abd: obese abdomen, no pain on palpation, BS present.  Ext:   RUE with Fistula, +bruit, +thrill, puncture site notes with stitch in place, no active bleeding.  LE groin wound erythematous, black eschar noted with some purulent and foul smelling drainage. palpable femoral pulse. LLE calf wound is clean with no purulent drainag, small ulceration noted in medial aspect of great toe. PT and DP pulses appreciated with the doppler.                          7.4    1.90  )-----------( 65       ( 19 Apr 2019 10:37 )             23.1     04-19    139  |  104  |  51<H>  ----------------------------<  107<H>  4.6   |  23  |  4.27<H>    Ca    7.6<L>      19 Apr 2019 10:37    TPro  5.5<L>  /  Alb  2.3<L>  /  TBili  0.5  /  DBili  x   /  AST  25  /  ALT  40  /  AlkPhos  85  04-19    PT/INR - ( 19 Apr 2019 10:37 )   PT: 39.9 sec;   INR: 3.46 ratio         PTT - ( 19 Apr 2019 10:37 )  PTT:35.7 sec

## 2019-04-19 NOTE — ED ADULT NURSE NOTE - NSIMPLEMENTINTERV_GEN_ALL_ED
Implemented All Fall with Harm Risk Interventions:  [place] to call system. Call bell, personal items and telephone within reach. Instruct patient to call for assistance. Room bathroom lighting operational. Non-slip footwear when patient is off stretcher. Physically safe environment: no spills, clutter or unnecessary equipment. Stretcher in lowest position, wheels locked, appropriate side rails in place. Provide visual cue, wrist band, yellow gown, etc. Monitor gait and stability. Monitor for mental status changes and reorient to person, place, and time. Review medications for side effects contributing to fall risk. Reinforce activity limits and safety measures with patient and family. Provide visual clues: red socks.

## 2019-04-19 NOTE — CONSULT NOTE ADULT - SUBJECTIVE AND OBJECTIVE BOX
NEPHROLOGY CONSULT  HPI:  83 y/o male with ESRD on HD (MWF), HTN, RA on MTX and steroids, R leg amputation, severe CDI and megacolon, Severe PAD s/p LLE endarterectomy 2 weeks ago with dr dugan presents with T 100 and DC from L groin.  Pt also c/o R arm pain/swelling where pt had fistulogram of the AC fistula on last admission.  RUL dopplers negative for DVT.  L groin sono--negative for pseudo aneurysm and + hematoma.  Pt given 2.1 L IVF, IV vanco/aztreonam in ED  On my exam in HD suite, awake, chronically ill appearing, NAD, daughter at bedside. Pt seen by Dr dugan in ED.  CXR--clear    TTE (10/18)--mild-mod MR/EF 60%    Pt d/w daughter regarding advance directives--Pt is FULL RESUSCITATION (19 Apr 2019 16:12)  pt seen in HD   R groin with dressing, erythema / moist and tenderness  R arm with swelling, ecchymosis, atender  was able to be accessed by the dialysis nurses  case d/w pts daughter        PAST MEDICAL & SURGICAL HISTORY:  Above knee amputation of right lower extremity  Recurrent Clostridium difficile diarrhea  Diastolic CHF  Peripheral vascular disease  Afib  Anemia  CKD (chronic kidney disease)  COPD (chronic obstructive pulmonary disease)  SHAYY (obstructive sleep apnea)  Sepsis, due to unspecified organism: 2/2 poorly healing wounds b/l  Dyspepsia: On moderate exertion.  Sleep apnea, obstructive: Requires home 02 therapy, and treatment with BIPAP  Atelectasis  Pleural effusion, bilateral  Respiratory failure  Peripheral edema  CRI (chronic renal insufficiency)  Gout  Benign prostatic hypertrophy  Spinal stenosis  Hypercholesterolemia  GERD (gastroesophageal reflux disease)  CAD (coronary artery disease)  Hypertension  S/P angioplasty with stent  Cataract of left eye  Prostate: Surgery green light procedure.  S/P rotator cuff surgery: Right  S/P angioplasty      FAMILY HISTORY:  Family history of colorectal cancer (Father)  Family history of diabetes mellitus (Mother, Sibling)  Family history of hypertension (Mother)      MEDICATIONS  (STANDING):  allopurinol 100 milliGRAM(s) Oral <User Schedule>  aspirin  chewable 81 milliGRAM(s) Oral daily  atorvastatin 20 milliGRAM(s) Oral at bedtime  aztreonam  IVPB 250 milliGRAM(s) IV Intermittent every 12 hours  cholestyramine Powder (Sugar-Free) 4 Gram(s) Oral daily  collagenase Ointment 1 Application(s) Topical two times a day  diltiazem    Tablet 60 milliGRAM(s) Oral four times a day  doxercalciferol Injectable 1 MICROGram(s) IV Push <User Schedule>  epoetin nelly Injectable 08286 Unit(s) IV Push <User Schedule>  finasteride 5 milliGRAM(s) Oral daily  lidocaine/prilocaine Cream 1 Application(s) Topical daily  pantoprazole    Tablet 40 milliGRAM(s) Oral before breakfast  predniSONE   Tablet 5 milliGRAM(s) Oral every other day  sevelamer hydrochloride Oral Tab/Cap - Peds 800 milliGRAM(s) Oral three times a day with meals  silver sulfADIAZINE 1% Cream 1 Application(s) Topical daily    MEDICATIONS  (PRN):  acetaminophen   Tablet .. 650 milliGRAM(s) Oral every 6 hours PRN Mild Pain (1 - 3)  acetaminophen   Tablet .. 650 milliGRAM(s) Oral every 6 hours PRN Temp greater or equal to 38.5C (101.3F), Moderate Pain (4 - 6)      Allergies    Zosyn (Rash)    Intolerances        I&O's Summary    19 Apr 2019 07:01  -  19 Apr 2019 22:03  --------------------------------------------------------  IN: 274 mL / OUT: 0 mL / NET: 274 mL          REVIEW OF SYSTEMS:      Vital Signs Last 24 Hrs  T(C): 37.9 (19 Apr 2019 21:19), Max: 37.9 (19 Apr 2019 21:19)  T(F): 100.3 (19 Apr 2019 21:19), Max: 100.3 (19 Apr 2019 21:19)  HR: 88 (19 Apr 2019 21:19) (83 - 94)  BP: 140/39 (19 Apr 2019 21:19) (100/50 - 167/60)  BP(mean): --  RR: 17 (19 Apr 2019 21:19) (16 - 18)  SpO2: 100% (19 Apr 2019 21:19) (96% - 100%)  Daily Height in cm: 170.18 (19 Apr 2019 18:40)    Daily   I&O's Summary    19 Apr 2019 07:01  -  19 Apr 2019 22:03  --------------------------------------------------------  IN: 274 mL / OUT: 0 mL / NET: 274 mL        PHYSICAL EXAM:    General:  Alert, well-developed ,No acute distress.    Neuro:  Alert and oriented to person, place, and time. Able to communicate  well. Cranial nerves 2-12 grossly intact. 5/5 strength in all  extremities bilaterally. Sensation intact in all extremities.  Appropriate affect.     HEENT:  No JVD, no masses, Eyes anicteric, No carotid bruits.No lymphadenopathy,    Cardiovascular:  Regular rate and rhythm, with normal S1 and S2. No murmurs, rubs,  or gallops. No JVD.     Lungs:  clear. no rales, no wheezing, .    Abdomen:  Normoactive bowel sounds. Soft, flat, non-tender, and non-distended.  No hepatosplenomegaly, positive bowel sounds    Skin:  Warm, dry, well-perfused. No rashes or other lesions.     Extremities:  left groin w erythema and dc    LABS:                        7.4    1.90  )-----------( 65       ( 19 Apr 2019 10:37 )             23.1     04-19    139  |  104  |  51<H>  ----------------------------<  107<H>  4.6   |  23  |  4.27<H>    Ca    7.6<L>      19 Apr 2019 10:37  Phos  3.7     04-19    TPro  5.5<L>  /  Alb  2.3<L>  /  TBili  0.5  /  DBili  x   /  AST  25  /  ALT  40  /  AlkPhos  85  04-19    PT/INR - ( 19 Apr 2019 10:37 )   PT: 39.9 sec;   INR: 3.46 ratio         PTT - ( 19 Apr 2019 10:37 )  PTT:35.7 sec    Phosphorus Level, Serum: 3.7 mg/dL (04-19 @ 10:37)

## 2019-04-19 NOTE — ED PROVIDER NOTE - CLINICAL SUMMARY MEDICAL DECISION MAKING FREE TEXT BOX
83 y/o male with PMHx of HTN, HLD, ESRD on HD MWF, RA on methotrexate and prednisone presents to the ED for fever which started today with associated left groin pain and d/c. Pt with recent admission and surgical procedure by Dr. Mccracken. Pt also c/o swelling in right arm. Exam with swelling to right arm, normal pulses, TTP left groin with brownish d/c, no crepitus or fluctuance. Concern for surgical site infection. Will do septic workup vascular consult and admit.

## 2019-04-19 NOTE — CONSULT NOTE ADULT - ASSESSMENT
85 y/o M with left infected groin s/p left femoral endarterectomy   -admitted to hospitalist service  -cont IV antibiotics  -LLE dressing changes daily with silvadene, non adherent gauze, 4x4, ABD pad, Kerlix and ACE bandage  -keep left groin surgical wound clean, apply 4x4s and tape QD and PRN  plan d/w Dr. Clement

## 2019-04-19 NOTE — ED ADULT TRIAGE NOTE - CHIEF COMPLAINT QUOTE
Pt BIBEMS for fever 100.5 at [facility] and change in drainage color in right groin s/p cath. Pt was medicated with Tylenol and an NSAID this morning PTA.

## 2019-04-19 NOTE — H&P ADULT - NSICDXFAMILYHX_GEN_ALL_CORE_FT
FAMILY HISTORY:  Father  Still living? Unknown  Family history of colorectal cancer, Age at diagnosis: Age Unknown    Mother  Still living? Unknown  Family history of diabetes mellitus, Age at diagnosis: Age Unknown  Family history of hypertension, Age at diagnosis: Age Unknown    Sibling  Still living? Unknown  Family history of diabetes mellitus, Age at diagnosis: Age Unknown

## 2019-04-19 NOTE — H&P ADULT - NSHPLABSRESULTS_GEN_ALL_CORE
7.4    1.90  )-----------( 65       ( 19 Apr 2019 10:37 )             23.1       CBC Full  -  ( 19 Apr 2019 10:37 )  WBC Count : 1.90 K/uL  RBC Count : 2.24 M/uL  Hemoglobin : 7.4 g/dL  Hematocrit : 23.1 %  Platelet Count - Automated : 65 K/uL  Mean Cell Volume : 103.1 fl  Mean Cell Hemoglobin : 33.0 pg  Mean Cell Hemoglobin Concentration : 32.0 gm/dL  Auto Neutrophil # : 1.52 K/uL  Auto Lymphocyte # : 0.27 K/uL  Auto Monocyte # : 0.04 K/uL  Auto Eosinophil # : 0.05 K/uL  Auto Basophil # : 0.01 K/uL  Auto Neutrophil % : 80.1 %  Auto Lymphocyte % : 14.2 %  Auto Monocyte % : 2.1 %  Auto Eosinophil % : 2.6 %  Auto Basophil % : 0.5 %      04-19    139  |  104  |  51<H>  ----------------------------<  107<H>  4.6   |  23  |  4.27<H>    Ca    7.6<L>      19 Apr 2019 10:37  Phos  3.7     04-19    TPro  5.5<L>  /  Alb  2.3<L>  /  TBili  0.5  /  DBili  x   /  AST  25  /  ALT  40  /  AlkPhos  85  04-19      PT/INR - ( 19 Apr 2019 10:37 )   PT: 39.9 sec;   INR: 3.46 ratio         PTT - ( 19 Apr 2019 10:37 )  PTT:35.7 sec        LIVER FUNCTIONS - ( 19 Apr 2019 10:37 )  Alb: 2.3 g/dL / Pro: 5.5 gm/dL / ALK PHOS: 85 U/L / ALT: 40 U/L / AST: 25 U/L / GGT: x

## 2019-04-19 NOTE — H&P ADULT - NSICDXPASTSURGICALHX_GEN_ALL_CORE_FT
PAST SURGICAL HISTORY:  Cataract of left eye     Prostate Surgery green light procedure.    S/P angioplasty     S/P angioplasty with stent     S/P rotator cuff surgery Right

## 2019-04-19 NOTE — ED PROVIDER NOTE - PROGRESS NOTE DETAILS
Ronald LOUIS for Dr. GALO Mccall: Spoke to Dr. Mccracken who will se pt today. Requests pt be admitted to hospitalist. patient seen by vascular surgery PA - agrees no drainable collection palpable - pt tba to medicine with vascular following- abx given - pt to get dialysis now - pt endorsed to Dr. Phillip Mccall M.D., Attending Physician

## 2019-04-19 NOTE — ED PROVIDER NOTE - PHYSICAL EXAMINATION
Constitutional: mild distress AAOx3  Eyes: PERRLA EOMI  Head: Normocephalic atraumatic  Mouth: MMM  Cardiac: regular rate   Resp: Lungs CTAB  GI: Abd s/nt/nd  Neuro: CN2-12 intact  Skin: No rashes  Msk: +right arm swelling. normal peripheral pulses. +good thrill at fistula site. +left groin surgical site with mild brownish d/c and mild TTP. Constitutional: mild distress AAOx3  Eyes: PERRLA EOMI  Head: Normocephalic atraumatic  Mouth: MMM  Cardiac: regular rate   Resp: Lungs CTAB  GI: Abd s/nt/nd  Neuro: CN2-12 intact  Skin: No rashes  Msk: +right arm swelling. normal peripheral pulses. +good thrill at fistula site. +left groin surgical site with mild brownish d/c and mild TTP. no fluctuance no crepitus.

## 2019-04-19 NOTE — CONSULT NOTE ADULT - ASSESSMENT
3 y/o male with ESRD on HD (MWF), HTN, RA on MTX and steroids, R leg amputation, severe CDI and megacolon, Severe PAD s/p LLE endarterectomy 2 weeks ago with dr dugan presents with T 100 and DC from L groin.  Pt also c/o R arm pain/swelling where pt had fistulogram of the AC fistula on last admission.  RUL dopplers negative for DVT.  L groin sono--negative for pseudo aneurysm and + hematoma.  Pt given 2.1 L IVF, IV vanco/aztreonam in ED  On my exam in HD suite, awake, chronically ill appearing, NAD, daughter at bedside. Pt seen by Dr dugan in ED.  CXR--clear    TTE (10/18)--mild-mod MR/EF 60%    Pt d/w daughter regarding advance directives--Pt is FULL RESUSCITATION (19 Apr 2019 16:12)  pt seen in HD   R groin with dressing, erythema / moist and tenderness  R arm with swelling, ecchymosis, atender  was able to be accessed by the dialysis nurses  case d/w pts daughter  cont hd as per outpt labs  contact vascular surgery for evaluation

## 2019-04-19 NOTE — H&P ADULT - NSICDXPASTMEDICALHX_GEN_ALL_CORE_FT
PAST MEDICAL HISTORY:  Above knee amputation of right lower extremity     Afib     Anemia     Atelectasis     Benign prostatic hypertrophy     CAD (coronary artery disease)     CKD (chronic kidney disease)     COPD (chronic obstructive pulmonary disease)     CRI (chronic renal insufficiency)     Diastolic CHF     Dyspepsia On moderate exertion.    GERD (gastroesophageal reflux disease)     Gout     Hypercholesterolemia     Hypertension     SHAYY (obstructive sleep apnea)     Peripheral edema     Peripheral vascular disease     Pleural effusion, bilateral     Recurrent Clostridium difficile diarrhea     Respiratory failure     Sepsis, due to unspecified organism 2/2 poorly healing wounds b/l    Sleep apnea, obstructive Requires home 02 therapy, and treatment with BIPAP    Spinal stenosis

## 2019-04-19 NOTE — H&P ADULT - HISTORY OF PRESENT ILLNESS
85 y/o male with ESRD on HD (MWF), HTN, RA on MTX and steroids, R leg amputation, severe CDI and megacolon, Severe PAD s/p LLE endarterectomy 2 weeks ago with dr dugan presents with T 100 and DC from L groin.  Pt also c/o R arm pain/swelling where pt had fistulogram of the AC fistula on last admission.  RUL dopplers negative for DVT.  L groin sono--negative for pseudo aneurysm and + hematoma.  Pt given 2.1 L IVF, IV vanco/aztreonam in ED  On my exam in HD suite, awake, chronically ill appearing, NAD, daughter at bedside. Pt seen by Dr dugan in ED.  CXR--clear    TTE (10/18)--mild-mod MR/EF 60%    Pt d/w daughter regarding advance directives--Pt is FULL RESUSCITATION

## 2019-04-20 LAB
ANION GAP SERPL CALC-SCNC: 7 MMOL/L — SIGNIFICANT CHANGE UP (ref 5–17)
APPEARANCE UR: CLEAR — SIGNIFICANT CHANGE UP
APTT BLD: 38.4 SEC — HIGH (ref 27.5–36.3)
BACTERIA # UR AUTO: ABNORMAL
BILIRUB UR-MCNC: ABNORMAL
BUN SERPL-MCNC: 27 MG/DL — HIGH (ref 7–23)
CALCIUM SERPL-MCNC: 8.1 MG/DL — LOW (ref 8.5–10.1)
CHLORIDE SERPL-SCNC: 108 MMOL/L — SIGNIFICANT CHANGE UP (ref 96–108)
CO2 SERPL-SCNC: 26 MMOL/L — SIGNIFICANT CHANGE UP (ref 22–31)
COLOR SPEC: YELLOW — SIGNIFICANT CHANGE UP
COMMENT - URINE: SIGNIFICANT CHANGE UP
CREAT SERPL-MCNC: 2.81 MG/DL — HIGH (ref 0.5–1.3)
DIFF PNL FLD: NEGATIVE — SIGNIFICANT CHANGE UP
EPI CELLS # UR: NEGATIVE — SIGNIFICANT CHANGE UP
GLUCOSE SERPL-MCNC: 88 MG/DL — SIGNIFICANT CHANGE UP (ref 70–99)
GLUCOSE UR QL: NEGATIVE MG/DL — SIGNIFICANT CHANGE UP
HCT VFR BLD CALC: 25.2 % — LOW (ref 39–50)
HGB BLD-MCNC: 8.1 G/DL — LOW (ref 13–17)
INR BLD: 3.3 RATIO — HIGH (ref 0.88–1.16)
KETONES UR-MCNC: NEGATIVE — SIGNIFICANT CHANGE UP
LEUKOCYTE ESTERASE UR-ACNC: ABNORMAL
MCHC RBC-ENTMCNC: 32.1 GM/DL — SIGNIFICANT CHANGE UP (ref 32–36)
MCHC RBC-ENTMCNC: 32.8 PG — SIGNIFICANT CHANGE UP (ref 27–34)
MCV RBC AUTO: 102 FL — HIGH (ref 80–100)
NITRITE UR-MCNC: NEGATIVE — SIGNIFICANT CHANGE UP
NRBC # BLD: 0 /100 WBCS — SIGNIFICANT CHANGE UP (ref 0–0)
PH UR: 5 — SIGNIFICANT CHANGE UP (ref 5–8)
PLATELET # BLD AUTO: 54 K/UL — LOW (ref 150–400)
POTASSIUM SERPL-MCNC: 4.1 MMOL/L — SIGNIFICANT CHANGE UP (ref 3.5–5.3)
POTASSIUM SERPL-SCNC: 4.1 MMOL/L — SIGNIFICANT CHANGE UP (ref 3.5–5.3)
PROT UR-MCNC: 100 MG/DL
PROTHROM AB SERPL-ACNC: 38 SEC — HIGH (ref 10–12.9)
RBC # BLD: 2.47 M/UL — LOW (ref 4.2–5.8)
RBC # FLD: 19.6 % — HIGH (ref 10.3–14.5)
RBC CASTS # UR COMP ASSIST: NEGATIVE /HPF — SIGNIFICANT CHANGE UP (ref 0–4)
SODIUM SERPL-SCNC: 141 MMOL/L — SIGNIFICANT CHANGE UP (ref 135–145)
SP GR SPEC: 1.02 — SIGNIFICANT CHANGE UP (ref 1.01–1.02)
UROBILINOGEN FLD QL: 1 MG/DL
VANCOMYCIN FLD-MCNC: 6.9 UG/ML — LOW (ref 10–20)
WBC # BLD: 2.05 K/UL — LOW (ref 3.8–10.5)
WBC # FLD AUTO: 2.05 K/UL — LOW (ref 3.8–10.5)
WBC UR QL: SIGNIFICANT CHANGE UP

## 2019-04-20 RX ORDER — AZTREONAM 2 G
500 VIAL (EA) INJECTION EVERY 12 HOURS
Qty: 0 | Refills: 0 | Status: DISCONTINUED | OUTPATIENT
Start: 2019-04-20 | End: 2019-04-23

## 2019-04-20 RX ORDER — VANCOMYCIN HCL 1 G
125 VIAL (EA) INTRAVENOUS EVERY 6 HOURS
Qty: 0 | Refills: 0 | Status: DISCONTINUED | OUTPATIENT
Start: 2019-04-20 | End: 2019-05-02

## 2019-04-20 RX ADMIN — Medication 60 MILLIGRAM(S): at 23:04

## 2019-04-20 RX ADMIN — Medication 5 MILLIGRAM(S): at 11:52

## 2019-04-20 RX ADMIN — Medication 650 MILLIGRAM(S): at 18:42

## 2019-04-20 RX ADMIN — Medication 125 MILLIGRAM(S): at 18:00

## 2019-04-20 RX ADMIN — Medication 100 MILLIGRAM(S): at 11:50

## 2019-04-20 RX ADMIN — Medication 81 MILLIGRAM(S): at 11:53

## 2019-04-20 RX ADMIN — Medication 2.5 MILLIGRAM(S): at 11:54

## 2019-04-20 RX ADMIN — PANTOPRAZOLE SODIUM 40 MILLIGRAM(S): 20 TABLET, DELAYED RELEASE ORAL at 05:54

## 2019-04-20 RX ADMIN — SEVELAMER CARBONATE 800 MILLIGRAM(S): 2400 POWDER, FOR SUSPENSION ORAL at 08:00

## 2019-04-20 RX ADMIN — Medication 60 MILLIGRAM(S): at 05:54

## 2019-04-20 RX ADMIN — Medication 60 MILLIGRAM(S): at 11:53

## 2019-04-20 RX ADMIN — ATORVASTATIN CALCIUM 20 MILLIGRAM(S): 80 TABLET, FILM COATED ORAL at 23:03

## 2019-04-20 RX ADMIN — SEVELAMER CARBONATE 800 MILLIGRAM(S): 2400 POWDER, FOR SUSPENSION ORAL at 17:22

## 2019-04-20 RX ADMIN — CHOLESTYRAMINE 4 GRAM(S): 4 POWDER, FOR SUSPENSION ORAL at 11:53

## 2019-04-20 RX ADMIN — Medication 125 MILLIGRAM(S): at 23:04

## 2019-04-20 RX ADMIN — Medication 50 MILLIGRAM(S): at 17:20

## 2019-04-20 RX ADMIN — Medication 60 MILLIGRAM(S): at 17:20

## 2019-04-20 RX ADMIN — FINASTERIDE 5 MILLIGRAM(S): 5 TABLET, FILM COATED ORAL at 11:53

## 2019-04-20 RX ADMIN — SEVELAMER CARBONATE 800 MILLIGRAM(S): 2400 POWDER, FOR SUSPENSION ORAL at 11:55

## 2019-04-20 NOTE — PROGRESS NOTE ADULT - SUBJECTIVE AND OBJECTIVE BOX
CHIEF COMPLAINT:  Per HPI;  83 y/o male with ESRD on HD (MWF), HTN, RA on MTX and steroids, R leg amputation, severe CDI and megacolon, Severe PAD s/p LLE endarterectomy 2 weeks ago with dr clement presents with T 100 and DC from L groin.  Pt also c/o R arm pain/swelling where pt had fistulogram of the AC fistula on last admission.  RUL dopplers negative for DVT.  L groin sono--negative for pseudo aneurysm and + hematoma.  Pt given 2.1 L IVF, IV vanco/aztreonam in ED  On my exam in HD suite, awake, chronically ill appearing, NAD, daughter at bedside. Pt seen by Dr Clement in ED.  CXR--clear. TTE (10/18)--mild-mod MR/EF 60%.  Pt d/w daughter regarding advance directives--Pt is FULL RESUSCITATION     SUBJECTIVE:   4/20: Pt seen and examined at bedside this AM, Dr. Clement also at bedside with wound dressing changed. Pt reports pain at site of left groin wound, also reports pain in the right arm. Last highest temp of 100.3 ~9pm last evening. Receiving Tylenol for pain control, on aztreonam abx. Pt denies any chills, shortness of breath, no CP.    REVIEW OF SYSTEMS:  CONSTITUTIONAL: No fevers or chills  EYES/ENT: No visual changes;  No vertigo or throat pain   NECK: No pain or stiffness  RESPIRATORY: No cough, wheezing, hemoptysis; No shortness of breath  CARDIOVASCULAR: No chest pain or palpitations  GASTROINTESTINAL: No abdominal or epigastric pain. No nausea, vomiting, or hematemesis; No diarrhea or constipation. No melena or hematochezia.  GENITOURINARY: No dysuria, frequency or hematuria  NEUROLOGICAL: No numbness or weakness  SKIN: No itching, burning, rashes, or lesions   All other review of systems is negative unless indicated above    Vital Signs Last 24 Hrs  T(C): 37 (20 Apr 2019 11:25), Max: 37.9 (19 Apr 2019 21:19)  T(F): 98.6 (20 Apr 2019 11:25), Max: 100.3 (19 Apr 2019 21:19)  HR: 93 (20 Apr 2019 11:25) (85 - 93)  BP: 136/76 (20 Apr 2019 11:25) (124/45 - 148/40)  BP(mean): --  RR: 16 (20 Apr 2019 11:25) (16 - 18)  SpO2: 96% (20 Apr 2019 11:25) (96% - 100%)    I&O's Summary    19 Apr 2019 07:01  -  20 Apr 2019 07:00  --------------------------------------------------------  IN: 274 mL / OUT: 0 mL / NET: 274 mL        CAPILLARY BLOOD GLUCOSE          PHYSICAL EXAM:  Constitutional: NAD, awake and alert, well-developed  HEENT: PERR, EOMI, Normal Hearing, MMM  Neck: Soft and supple, No LAD, No JVD  Respiratory: Breath sounds are clear bilaterally, No wheezing, rales or rhonchi  Cardiovascular: S1 and S2, regular rate and rhythm, no Murmurs, gallops or rubs  Gastrointestinal: Bowel Sounds present, soft, nontender, nondistended, no guarding, no rebound  Extremities: No peripheral edema  Vascular: 2+ peripheral pulses  Neurological: A/O x 3, no focal deficits  Musculoskeletal: 5/5 strength b/l upper and lower extremities  Skin: No rashes    MEDICATIONS:  MEDICATIONS  (STANDING):  allopurinol 100 milliGRAM(s) Oral <User Schedule>  aspirin  chewable 81 milliGRAM(s) Oral daily  atorvastatin 20 milliGRAM(s) Oral at bedtime  aztreonam  IVPB 500 milliGRAM(s) IV Intermittent every 12 hours  cholestyramine Powder (Sugar-Free) 4 Gram(s) Oral daily  collagenase Ointment 1 Application(s) Topical two times a day  diltiazem    Tablet 60 milliGRAM(s) Oral four times a day  doxercalciferol Injectable 1 MICROGram(s) IV Push <User Schedule>  epoetin nelly Injectable 73905 Unit(s) IV Push <User Schedule>  finasteride 5 milliGRAM(s) Oral daily  lidocaine/prilocaine Cream 1 Application(s) Topical daily  pantoprazole    Tablet 40 milliGRAM(s) Oral before breakfast  predniSONE   Tablet 5 milliGRAM(s) Oral every other day  predniSONE   Tablet 2.5 milliGRAM(s) Oral every other day  sevelamer hydrochloride Oral Tab/Cap - Peds 800 milliGRAM(s) Oral three times a day with meals  silver sulfADIAZINE 1% Cream 1 Application(s) Topical daily  vancomycin    Solution 125 milliGRAM(s) Oral every 6 hours      LABS: All Labs Reviewed:                        8.1    2.05  )-----------( 54       ( 20 Apr 2019 07:24 )             25.2     04-20    141  |  108  |  27<H>  ----------------------------<  88  4.1   |  26  |  2.81<H>    Ca    8.1<L>      20 Apr 2019 07:24  Phos  3.7     04-19    TPro  5.5<L>  /  Alb  2.3<L>  /  TBili  0.5  /  DBili  x   /  AST  25  /  ALT  40  /  AlkPhos  85  04-19    PT/INR - ( 20 Apr 2019 07:24 )   PT: 38.0 sec;   INR: 3.30 ratio         PTT - ( 20 Apr 2019 07:24 )  PTT:38.4 sec      Blood Culture:     RADIOLOGY/EKG:    DVT PPX:    ADVANCED DIRECTIVE:    DISPOSITION: CHIEF COMPLAINT:  Per HPI;  85 y/o male with ESRD on HD (MWF), HTN, RA on MTX and steroids, R leg amputation, severe CDI and megacolon, Severe PAD s/p LLE endarterectomy 2 weeks ago with dr clement presents with T 100 and DC from L groin.  Pt also c/o R arm pain/swelling where pt had fistulogram of the AC fistula on last admission.  RUL dopplers negative for DVT.  L groin sono--negative for pseudo aneurysm and + hematoma.  Pt given 2.1 L IVF, IV vanco/aztreonam in ED  On my exam in HD suite, awake, chronically ill appearing, NAD, daughter at bedside. Pt seen by Dr Clement in ED.  CXR--clear. TTE (10/18)--mild-mod MR/EF 60%.  Pt d/w daughter regarding advance directives--Pt is FULL RESUSCITATION     SUBJECTIVE:   4/20: Pt seen and examined at bedside this AM, Dr. Clement also at bedside with wound dressing changed. Pt reports pain at site of left groin wound, also reports pain in the right arm. Last highest temp of 100.3 ~9pm last evening. Receiving Tylenol for pain control, on aztreonam abx. Pt denies any chills, shortness of breath, no CP.    REVIEW OF SYSTEMS:  CONSTITUTIONAL: No fevers or chills  EYES/ENT: No visual changes;  No vertigo or throat pain   NECK: No pain or stiffness  RESPIRATORY: No cough, wheezing, hemoptysis; No shortness of breath  CARDIOVASCULAR: No chest pain or palpitations  GASTROINTESTINAL: left groin pain, no nausea or vomiting  GENITOURINARY: No dysuria, frequency or hematuria  NEUROLOGICAL: No numbness or weakness  SKIN: No itching, burning, rashes, or lesions   All other review of systems is negative unless indicated above    Vital Signs Last 24 Hrs  T(C): 37 (20 Apr 2019 11:25), Max: 37.9 (19 Apr 2019 21:19)  T(F): 98.6 (20 Apr 2019 11:25), Max: 100.3 (19 Apr 2019 21:19)  HR: 93 (20 Apr 2019 11:25) (85 - 93)  BP: 136/76 (20 Apr 2019 11:25) (124/45 - 148/40)  BP(mean): --  RR: 16 (20 Apr 2019 11:25) (16 - 18)  SpO2: 96% (20 Apr 2019 11:25) (96% - 100%)    I&O's Summary    19 Apr 2019 07:01  -  20 Apr 2019 07:00  --------------------------------------------------------  IN: 274 mL / OUT: 0 mL / NET: 274 mL        CAPILLARY BLOOD GLUCOSE          PHYSICAL EXAM:  Constitutional: NAD, awake and alert  HEENT: EOMI, Normal Hearing, MMM  Neck: Soft and supple, No LAD, No JVD  Respiratory: Breath sounds are clear bilaterally, No wheezing, rales or rhonchi  Cardiovascular: S1 and S2, regular rate and rhythm, no Murmurs, gallops or rubs  Gastrointestinal: + Left groin tenderness, wound dressing in place, +BS  Extremities: RLE amputation  Vascular: 2+ peripheral pulses  Neurological: A/O x 3, no focal deficits  Musculoskeletal: 5/5 strength b/l upper and lower extremities  Skin: marked ecchymoses of b/l UE R>L    MEDICATIONS:  MEDICATIONS  (STANDING):  allopurinol 100 milliGRAM(s) Oral <User Schedule>  aspirin  chewable 81 milliGRAM(s) Oral daily  atorvastatin 20 milliGRAM(s) Oral at bedtime  aztreonam  IVPB 500 milliGRAM(s) IV Intermittent every 12 hours  cholestyramine Powder (Sugar-Free) 4 Gram(s) Oral daily  collagenase Ointment 1 Application(s) Topical two times a day  diltiazem    Tablet 60 milliGRAM(s) Oral four times a day  doxercalciferol Injectable 1 MICROGram(s) IV Push <User Schedule>  epoetin nelly Injectable 18833 Unit(s) IV Push <User Schedule>  finasteride 5 milliGRAM(s) Oral daily  lidocaine/prilocaine Cream 1 Application(s) Topical daily  pantoprazole    Tablet 40 milliGRAM(s) Oral before breakfast  predniSONE   Tablet 5 milliGRAM(s) Oral every other day  predniSONE   Tablet 2.5 milliGRAM(s) Oral every other day  sevelamer hydrochloride Oral Tab/Cap - Peds 800 milliGRAM(s) Oral three times a day with meals  silver sulfADIAZINE 1% Cream 1 Application(s) Topical daily  vancomycin    Solution 125 milliGRAM(s) Oral every 6 hours      LABS: All Labs Reviewed:                        8.1    2.05  )-----------( 54       ( 20 Apr 2019 07:24 )             25.2     04-20    141  |  108  |  27<H>  ----------------------------<  88  4.1   |  26  |  2.81<H>    Ca    8.1<L>      20 Apr 2019 07:24  Phos  3.7     04-19    TPro  5.5<L>  /  Alb  2.3<L>  /  TBili  0.5  /  DBili  x   /  AST  25  /  ALT  40  /  AlkPhos  85  04-19    PT/INR - ( 20 Apr 2019 07:24 )   PT: 38.0 sec;   INR: 3.30 ratio         PTT - ( 20 Apr 2019 07:24 )  PTT:38.4 sec      < from: US Duplex Arterial Lower Ext Ltd, Left (04.19.19 @ 12:13) >  Findings:    There is no evidence of pseudoaneurysm in the left groin. There is a   hematoma anterior to the left common femoral artery measuring   approximately 3.2 x 0.9 x 5.0 cm.    There is plaque within the proximal left femoral artery, with an elevated   velocity of 110 cm/s.    Impression:    Focal ultrasound of the left groin demonstrates no evidence of   pseudoaneurysm.    Hematoma anterior to the left common femoral artery measuring   approximately 3.2 x 0.9 x 5.0 cm.    There is plaque within the proximal left femoral artery, with an elevated   velocity of 110 cm/s.          < from: US Duplex Venous Upper Ext Ltd, Right (04.19.19 @ 12:16) >  FINDINGS:    The right internal jugular, subclavian, axillary, brachial, basilic and   cephalic veins are patent and compressible where applicable.     Doppler examination shows normal spontaneous and phasic flow.    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  Markedly increased velocities within the patient's arteriovenous fistula   raising suspicious for hemodynamically significant stenosis.    < end of copied text > CHIEF COMPLAINT:  Per HPI:  85 y/o male with ESRD on HD (MWF), HTN, RA on MTX and steroids, R leg amputation, severe CDI and megacolon, Severe PAD s/p LLE endarterectomy 2 weeks ago with dr clement presents with T 100 and DC from L groin.  Pt also c/o R arm pain/swelling where pt had fistulogram of the AC fistula on last admission.  RUL dopplers negative for DVT.  L groin sono--negative for pseudo aneurysm and + hematoma.  Pt given 2.1 L IVF, IV vanco/aztreonam in ED  On my exam in HD suite, awake, chronically ill appearing, NAD, daughter at bedside. Pt seen by Dr Clement in ED.  CXR--clear. TTE (10/18)--mild-mod MR/EF 60%.  Pt d/w daughter regarding advance directives--Pt is FULL RESUSCITATION     SUBJECTIVE:   4/20: Pt seen and examined at bedside this AM, Dr. Clement also at bedside with wound dressing changed. Pt reports pain at site of left groin wound, also reports pain in the right arm. Last highest temp of 100.3 ~9pm last evening. Receiving Tylenol for pain control, on aztreonam abx. Pt denies any chills, shortness of breath, no CP.    REVIEW OF SYSTEMS:  CONSTITUTIONAL: No fevers or chills  EYES/ENT: No visual changes;  No vertigo or throat pain   NECK: No pain or stiffness  RESPIRATORY: No cough, wheezing, hemoptysis; No shortness of breath  CARDIOVASCULAR: No chest pain or palpitations  GASTROINTESTINAL: left groin pain, no nausea or vomiting  GENITOURINARY: No dysuria, frequency or hematuria  NEUROLOGICAL: No numbness or weakness  SKIN: No itching or burning  All other review of systems is negative unless indicated above    Vital Signs Last 24 Hrs  T(C): 37 (20 Apr 2019 11:25), Max: 37.9 (19 Apr 2019 21:19)  T(F): 98.6 (20 Apr 2019 11:25), Max: 100.3 (19 Apr 2019 21:19)  HR: 93 (20 Apr 2019 11:25) (85 - 93)  BP: 136/76 (20 Apr 2019 11:25) (124/45 - 148/40)  BP(mean): --  RR: 16 (20 Apr 2019 11:25) (16 - 18)  SpO2: 96% (20 Apr 2019 11:25) (96% - 100%)    I&O's Summary    19 Apr 2019 07:01  -  20 Apr 2019 07:00  --------------------------------------------------------  IN: 274 mL / OUT: 0 mL / NET: 274 mL        CAPILLARY BLOOD GLUCOSE        PHYSICAL EXAM:  Constitutional: NAD, awake and alert  HEENT: EOMI, Normal Hearing, MMM  Neck: Soft and supple, No LAD, No JVD  Respiratory: Breath sounds are clear bilaterally, No wheezing, rales or rhonchi  Cardiovascular: S1 and S2, regular rate and rhythm, no Murmurs, gallops or rubs  Gastrointestinal: + Left groin tenderness, wound dressing in place, +BS  Extremities: RLE amputation  Vascular: 2+ peripheral pulses  Neurological: A/O x 3, no focal deficits  Skin: marked ecchymoses of b/l UE R>L    MEDICATIONS:  MEDICATIONS  (STANDING):  allopurinol 100 milliGRAM(s) Oral <User Schedule>  aspirin  chewable 81 milliGRAM(s) Oral daily  atorvastatin 20 milliGRAM(s) Oral at bedtime  aztreonam  IVPB 500 milliGRAM(s) IV Intermittent every 12 hours  cholestyramine Powder (Sugar-Free) 4 Gram(s) Oral daily  collagenase Ointment 1 Application(s) Topical two times a day  diltiazem    Tablet 60 milliGRAM(s) Oral four times a day  doxercalciferol Injectable 1 MICROGram(s) IV Push <User Schedule>  epoetin nelly Injectable 96584 Unit(s) IV Push <User Schedule>  finasteride 5 milliGRAM(s) Oral daily  lidocaine/prilocaine Cream 1 Application(s) Topical daily  pantoprazole    Tablet 40 milliGRAM(s) Oral before breakfast  predniSONE   Tablet 5 milliGRAM(s) Oral every other day  predniSONE   Tablet 2.5 milliGRAM(s) Oral every other day  sevelamer hydrochloride Oral Tab/Cap - Peds 800 milliGRAM(s) Oral three times a day with meals  silver sulfADIAZINE 1% Cream 1 Application(s) Topical daily  vancomycin    Solution 125 milliGRAM(s) Oral every 6 hours      LABS: All Labs Reviewed:                        8.1    2.05  )-----------( 54       ( 20 Apr 2019 07:24 )             25.2     04-20    141  |  108  |  27<H>  ----------------------------<  88  4.1   |  26  |  2.81<H>    Ca    8.1<L>      20 Apr 2019 07:24  Phos  3.7     04-19    TPro  5.5<L>  /  Alb  2.3<L>  /  TBili  0.5  /  DBili  x   /  AST  25  /  ALT  40  /  AlkPhos  85  04-19    PT/INR - ( 20 Apr 2019 07:24 )   PT: 38.0 sec;   INR: 3.30 ratio         PTT - ( 20 Apr 2019 07:24 )  PTT:38.4 sec      < from: US Duplex Arterial Lower Ext Ltd, Left (04.19.19 @ 12:13) >  Findings:    There is no evidence of pseudoaneurysm in the left groin. There is a   hematoma anterior to the left common femoral artery measuring   approximately 3.2 x 0.9 x 5.0 cm.    There is plaque within the proximal left femoral artery, with an elevated   velocity of 110 cm/s.    Impression:    Focal ultrasound of the left groin demonstrates no evidence of   pseudoaneurysm.    Hematoma anterior to the left common femoral artery measuring   approximately 3.2 x 0.9 x 5.0 cm.    There is plaque within the proximal left femoral artery, with an elevated   velocity of 110 cm/s.          < from: US Duplex Venous Upper Ext Ltd, Right (04.19.19 @ 12:16) >  FINDINGS:    The right internal jugular, subclavian, axillary, brachial, basilic and   cephalic veins are patent and compressible where applicable.     Doppler examination shows normal spontaneous and phasic flow.    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  Markedly increased velocities within the patient's arteriovenous fistula   raising suspicious for hemodynamically significant stenosis.    < end of copied text >

## 2019-04-20 NOTE — PROGRESS NOTE ADULT - SUBJECTIVE AND OBJECTIVE BOX
History as per surgical PA note.  Pt recently status post R upper extremity fistulogram and angioplasty of innominate vein and L common femoral endarterctomy performed on Mar 28 and  respectively.  His post operative course from the endarterectomy was noteworthy for an improvement in his L foot pain.  He did develop epidermolysis of the distal third of the medial flap but had no drainage until last several days when he began draining with associated low grade fever.  He currently notes mild L groin incisional discomfort.  An arterial duplex performed yesterday demonstrated a small collection but no pseudoaneurysm.     Yesterday in dialysis after decannulation, he developed prolonged bleeding from the arterial needle puncture site that eventually stopped with prolonged pressure.     PE  L groin with epidermolysis of  medial flap and seropurulent drainage; slight erythema of wound edges  L toes warm; bandage on calf ulcer/wound left in place    R arm AV patent with slightly pulsatile thrill and audible normal pitched bruit; ecchymosis of upper arm; R hand adequately perfused appearing with grossly normal neurosensory and motor function    A/P  L groin infection.  Incision opened and culture obtained.  Noted to patient he may require operative washout and potentially muscle flap coverage    will observe results of culture    will observe conduct of next dialysis session to determine whether function acceptable.     MEDICATIONS  (STANDING):  allopurinol 100 milliGRAM(s) Oral <User Schedule>  aspirin  chewable 81 milliGRAM(s) Oral daily  atorvastatin 20 milliGRAM(s) Oral at bedtime  aztreonam  IVPB 250 milliGRAM(s) IV Intermittent every 12 hours  cholestyramine Powder (Sugar-Free) 4 Gram(s) Oral daily  collagenase Ointment 1 Application(s) Topical two times a day  diltiazem    Tablet 60 milliGRAM(s) Oral four times a day  doxercalciferol Injectable 1 MICROGram(s) IV Push <User Schedule>  epoetin nelly Injectable 05246 Unit(s) IV Push <User Schedule>  finasteride 5 milliGRAM(s) Oral daily  lidocaine/prilocaine Cream 1 Application(s) Topical daily  methotrexate 2.5 milliGRAM(s) Oral every week  pantoprazole    Tablet 40 milliGRAM(s) Oral before breakfast  predniSONE   Tablet 5 milliGRAM(s) Oral every other day  predniSONE   Tablet 2.5 milliGRAM(s) Oral every other day  sevelamer hydrochloride Oral Tab/Cap - Peds 800 milliGRAM(s) Oral three times a day with meals  silver sulfADIAZINE 1% Cream 1 Application(s) Topical daily    MEDICATIONS  (PRN):  acetaminophen   Tablet .. 650 milliGRAM(s) Oral every 6 hours PRN Mild Pain (1 - 3)  acetaminophen   Tablet .. 650 milliGRAM(s) Oral every 6 hours PRN Temp greater or equal to 38.5C (101.3F), Moderate Pain (4 - 6)      Allergies    Zosyn (Rash)    Intolerances        Flatus: [ ] YES [ ] NO             Bowel Movement: [ ] YES [ ] NO  Pain (0-10):            Pain Control Adequate: [ ] YES [ ] NO  Nausea: [ ] YES [ ] NO            Vomiting: [ ] YES [ ] NO  Diarrhea: [ ] YES [ ] NO         Constipation: [ ] YES [ ] NO     Chest Pain: [ ] YES [ ] NO    SOB:  [ ] YES [ ] NO    Vital Signs Last 24 Hrs  T(C): 37.3 (2019 04:49), Max: 37.9 (2019 21:19)  T(F): 99.1 (2019 04:49), Max: 100.3 (2019 21:19)  HR: 85 (2019 04:49) (83 - 94)  BP: 130/45 (2019 04:49) (100/50 - 167/60)  BP(mean): --  RR: 18 (2019 04:49) (16 - 18)  SpO2: 100% (2019 04:49) (96% - 100%)    I&O's Summary    2019 07:01  -  2019 07:00  --------------------------------------------------------  IN: 274 mL / OUT: 0 mL / NET: 274 mL          LABS:                        8.1    2.05  )-----------( 54       ( 2019 07:24 )             25.2     04-20    141  |  108  |  27<H>  ----------------------------<  88  4.1   |  26  |  2.81<H>    Ca    8.1<L>      2019 07:24  Phos  3.7     -    TPro  5.5<L>  /  Alb  2.3<L>  /  TBili  0.5  /  DBili  x   /  AST  25  /  ALT  40  /  AlkPhos  85  04-19    PT/INR - ( 2019 07:24 )   PT: 38.0 sec;   INR: 3.30 ratio         PTT - ( 2019 07:24 )  PTT:38.4 sec  Urinalysis Basic - ( 2019 04:15 )    Color: Yellow / Appearance: Clear / S.020 / pH: x  Gluc: x / Ketone: Negative  / Bili: Moderate / Urobili: 1 mg/dL   Blood: x / Protein: 100 mg/dL / Nitrite: Negative   Leuk Esterase: Small / RBC: Negative /HPF / WBC 3-5   Sq Epi: x / Non Sq Epi: Negative / Bacteria: Few      LIVER FUNCTIONS - ( 2019 10:37 )  Alb: 2.3 g/dL / Pro: 5.5 gm/dL / ALK PHOS: 85 U/L / ALT: 40 U/L / AST: 25 U/L / GGT: x           CAPILLARY BLOOD GLUCOSE          RADIOLOGY & ADDITIONAL TESTS:

## 2019-04-20 NOTE — PROGRESS NOTE ADULT - SUBJECTIVE AND OBJECTIVE BOX
NEPHROLOGY CONSULT  HPI:  83 y/o male with ESRD on HD (MWF), HTN, RA on MTX and steroids, R leg amputation, severe CDI and megacolon, Severe PAD s/p LLE endarterectomy 2 weeks ago with dr dugan presents with T 100 and DC from L groin.  Pt also c/o R arm pain/swelling where pt had fistulogram of the AC fistula on last admission.  RUL dopplers negative for DVT.  L groin sono--negative for pseudo aneurysm and + hematoma.  Pt given 2.1 L IVF, IV vanco/aztreonam in ED  On my exam in HD suite, awake, chronically ill appearing, NAD, daughter at bedside. Pt seen by Dr dugan in ED.  CXR--clear    TTE (10/18)--mild-mod MR/EF 60%    Pt d/w daughter regarding advance directives--Pt is FULL RESUSCITATION (19 Apr 2019 16:12)    4/20  s/p hd yesterday  prolonged bleed from access stopped w pressure  feels well  arm less tender  received 1 u prbc on hd yesterday      PAST MEDICAL & SURGICAL HISTORY:  Above knee amputation of right lower extremity  Recurrent Clostridium difficile diarrhea  Diastolic CHF  Peripheral vascular disease  Afib  Anemia  CKD (chronic kidney disease)  COPD (chronic obstructive pulmonary disease)  SHAYY (obstructive sleep apnea)  Sepsis, due to unspecified organism: 2/2 poorly healing wounds b/l  Dyspepsia: On moderate exertion.  Sleep apnea, obstructive: Requires home 02 therapy, and treatment with BIPAP  Atelectasis  Pleural effusion, bilateral  Respiratory failure  Peripheral edema  CRI (chronic renal insufficiency)  Gout  Benign prostatic hypertrophy  Spinal stenosis  Hypercholesterolemia  GERD (gastroesophageal reflux disease)  CAD (coronary artery disease)  Hypertension  S/P angioplasty with stent  Cataract of left eye  Prostate: Surgery green light procedure.  S/P rotator cuff surgery: Right  S/P angioplasty      FAMILY HISTORY:  Family history of colorectal cancer (Father)  Family history of diabetes mellitus (Mother, Sibling)  Family history of hypertension (Mother)      MEDICATIONS  (STANDING):  allopurinol 100 milliGRAM(s) Oral <User Schedule>  aspirin  chewable 81 milliGRAM(s) Oral daily  atorvastatin 20 milliGRAM(s) Oral at bedtime  aztreonam  IVPB 500 milliGRAM(s) IV Intermittent every 12 hours  cholestyramine Powder (Sugar-Free) 4 Gram(s) Oral daily  collagenase Ointment 1 Application(s) Topical two times a day  diltiazem    Tablet 60 milliGRAM(s) Oral four times a day  doxercalciferol Injectable 1 MICROGram(s) IV Push <User Schedule>  epoetin nelly Injectable 61466 Unit(s) IV Push <User Schedule>  finasteride 5 milliGRAM(s) Oral daily  lidocaine/prilocaine Cream 1 Application(s) Topical daily  pantoprazole    Tablet 40 milliGRAM(s) Oral before breakfast  predniSONE   Tablet 5 milliGRAM(s) Oral every other day  predniSONE   Tablet 2.5 milliGRAM(s) Oral every other day  sevelamer hydrochloride Oral Tab/Cap - Peds 800 milliGRAM(s) Oral three times a day with meals  silver sulfADIAZINE 1% Cream 1 Application(s) Topical daily  vancomycin    Solution 125 milliGRAM(s) Oral every 6 hours    MEDICATIONS  (PRN):  acetaminophen   Tablet .. 650 milliGRAM(s) Oral every 6 hours PRN Mild Pain (1 - 3)  acetaminophen   Tablet .. 650 milliGRAM(s) Oral every 6 hours PRN Temp greater or equal to 38.5C (101.3F), Moderate Pain (4 - 6)        Allergies    Zosyn (Rash)    Intolerances          REVIEW OF SYSTEMS:    CONSTITUTIONAL:  As per HPI.  CONSTITUTIONAL: +  weakness, fevers or chills  EYES/ENT: No visual changes;  No vertigo or throat pain   NECK: No pain or stiffness  CARDIOVASCULAR: No chest pain or palpitations  GASTROINTESTINAL: No abdominal or epigastric pain. No nausea, vomiting, or hematemesis; No diarrhea or constipation. No melena or hematochezia.  GENITOURINARY: No dysuria, frequency or hematuria  NEUROLOGICAL: No numbness or weakness  SKIN: No itching, burning, rashes, or lesions   All other review of systems is negative unless indicated above      Vital Signs Last 24 Hrs  T(C): 38.2 (20 Apr 2019 18:08), Max: 38.2 (20 Apr 2019 18:08)  T(F): 100.7 (20 Apr 2019 18:08), Max: 100.7 (20 Apr 2019 18:08)  HR: 68 (20 Apr 2019 18:10) (68 - 93)  BP: 148/48 (20 Apr 2019 18:08) (130/45 - 148/48)  BP(mean): --  RR: 17 (20 Apr 2019 18:08) (16 - 18)  SpO2: 96% (20 Apr 2019 18:08) (96% - 100%)      PHYSICAL EXAM:    General:  Alert, well-developed ,No acute distress.    Neuro:  Alert and oriented to person, place, and time.     HEENT:  No JVD,    Cardiovascular:  Regular rate and rhythm, with normal S1 and S2. No murmurs, rubs,  or gallops. No JVD.     Lungs:  clear. no rales, no wheezing, .    Abdomen:  Normoactive bowel sounds. Soft, flat, non-tender, and non-distended.  No hepatosplenomegaly, positive bowel sounds    Skin:  Warm, dry, well-perfused. No rashes or other lesions.     Extremities:  R avf  less swelling  + resolving ecchymosis    LABS:    04-20    141  |  108  |  27<H>  ----------------------------<  88  4.1   |  26  |  2.81<H>    Ca    8.1<L>      20 Apr 2019 07:24  Phos  3.7     04-19    TPro  5.5<L>  /  Alb  2.3<L>  /  TBili  0.5  /  DBili  x   /  AST  25  /  ALT  40  /  AlkPhos  85  04-19                          8.1    2.05  )-----------( 54       ( 20 Apr 2019 07:24 )             25.2

## 2019-04-20 NOTE — PROGRESS NOTE ADULT - ASSESSMENT
83 y/o male with ESRD on HD (MWF), HTN, RA on MTX and steroids, R leg amputation, severe CDI and megacolon, Severe PAD s/p LLE endarterectomy 2 weeks ago with dr dugan presents with T 100 and DC from L groin.  Pt also c/o R arm pain/swelling where pt had fistulogram of the AC fistula on last admission.  RUL dopplers negative for DVT.  L groin sono--negative for pseudo aneurysm and + hematoma.  Pt given 2.1 L IVF, IV vanco/aztreonam in ED  On my exam in HD suite, awake, chronically ill appearing, NAD, daughter at bedside. Pt seen by Dr dugan in ED.  CXR--clear        4/20  s/p hd yesterday  prolonged bleed from access stopped w pressure  feels well  arm less tender  received 1 u prbc on hd yesterday  monitor cbc

## 2019-04-20 NOTE — PROGRESS NOTE ADULT - ASSESSMENT
#Post-op infection of left groin  - Pt with h/o severe PAD s/p LLLE endarterectomy on _______  - Pt is currently afebrile  - LLE arterial doppler with hematoma anterior to left common femoral artery  - LLE venous doppler -   - Continue on Aztreonam  - Vascular recs appreciated  - F/U wound culture, bcx    #Pancytopenia  - Hold methotrexate (MTX) tonight, as can be a contributing factor  - F/U AM CBC    #Supratherapeutic INR  - INR of 3.46 on admission ->3.30 this AM  - Hold coumadin tonight    #R arm pain/swelling  - R arm with marked ecchymosis, not hard to palpation  - s/p R arm fistulogram on previous admission  - RUE venous doppler - no evidence of DVT; markedly increased velocity in AV fistula of arm raising suspicion of hemodynamically significant stenosis    #h/o severe C. diff Infection with megacolon  - PO vanco during hospitalization- continue  - ID recs appreciated    #ESRD  - on hemodialysis (MWF)  - Continue HD per schedule    #Rheumatoid Arthritis  - Pt on MTX once/week (saturday)  - Will tonight's dose of MTX given pancytopenia      #DVT ppx  - Pt on coumadin - Hold tonight given supratherapeutic INR #Post-op infection of left groin  - Pt with h/o severe PAD s/p LLE endarterectomy on April1, 2019  - Noted to have drainage from surgical wound site of L groin region, pt also with pain at site  - Tmax temp of 100.5 prior to ED presentation  - Pt is currently afebrile  - LLE arterial doppler with hematoma anterior to left common femoral artery  - Continue on Aztreonam  - Tylenol PRN for pain/fever  - Wound care - LLE dressing changes daily with silvadene, non adherent gauze, 4x4, ABD pad, Kerlix and ACE bandage, keep wound clean, apply 4x4s and tape QD and PRN  - Vascular recs appreciated  - F/U wound culture, bcx, Ucx    #R arm pain/swelling  - R arm with marked ecchymosis, not hard to palpation  - s/p R arm fistula revision with fistulogram on March 28, 2019 by Dr. Clement  - s/p prolonged bleed on previous day following decannulation at fistula site - bleeding stopped after applying pressure  - RUE venous doppler - no evidence of DVT; markedly increased velocity in AV fistula of arm raising suspicion of hemodynamically significant stenosis  - Per Vascular surgery, will observe function of fistula on next dialysis to evaluate further management    #Pancytopenia  - Low WBC, H/H and Plt levels  - Hold methotrexate (MTX) tonight, as can be a contributing factor  - F/U AM CBC    #Supratherapeutic INR  - INR of 3.46 on admission ->3.30 this AM  - Hold coumadin tonight  - Check INR in the AM    #Afib  - rate controlled  - On coumadin- HOLD tonight as INR supratherapeutic  - Continue Cardizem PO/statin      #h/o severe C. diff Infection with megacolon  - PO Vanco Q6hrs  - ID recs appreciated    #HFpEF  - TTE (10/18)--mild-mod MR/EF 60%.    #COPD  -continue home medications    #ESRD  - on hemodialysis (MWF)  - Continue HD per schedule  - Nephro recs appreciated    #Rheumatoid Arthritis  - Pt on MTX once/week (saturday)  - HOLD tonight's dose of MTX given pancytopenia  - Continue prednisone    #DVT ppx  - Pt on coumadin - Hold tonight given supratherapeutic INR

## 2019-04-20 NOTE — CONSULT NOTE ADULT - ASSESSMENT
85 y/o male with ESRD on HD (MWF), HTN, RA on MTX and steroids, R leg amputation, severe CDI and megacolon, Severe PAD s/p LLE endarterectomy 2 weeks ago with dr dugan presents with T 100 and DC from L groin.  Pt also c/o R arm pain/swelling where pt had fistulogram of the AC fistula on last admission.  RUL dopplers negative for DVT.  L groin sono--negative for pseudo aneurysm and + hematoma, eval [facility] vascular surgery noted to have seropurulent drainage from L groin along medial flap and erythema along wound edges concerning for superimposed infection.  Pt given 2.1 L IVF, IV vanco/aztreonam in ED.     1. L groin hematoma. L groin wound w/ superimposed infection/cellulitis. ESRD. PCN allergy  - appreciate vascular eval  - agree with vancomycin 1gm post HD check level today dose given 4/19  - agree with aztreonam [id number] gnr resistant coverage  - fu cultures  - monitor temps  - if doesnt improve may need washout  - tolerating abx well so far; no side effects noted  - reason for abx use and side effects reviewed with patient  - supportive care  - f/u cbc    2. other issues - care per medicine 83 y/o male with ESRD on HD (MWF), HTN, RA on MTX and steroids, R leg amputation, severe CDI and megacolon, Severe PAD s/p LLE endarterectomy 2 weeks ago with dr dugan presents with T 100 and DC from L groin.  Pt also c/o R arm pain/swelling where pt had fistulogram of the AC fistula on last admission.  RUL dopplers negative for DVT.  L groin sono--negative for pseudo aneurysm and + hematoma, eval [facility] vascular surgery noted to have seropurulent drainage from L groin along medial flap and erythema along wound edges concerning for superimposed infection.  Pt given 2.1 L IVF, IV vanco/aztreonam in ED.     1. L groin wound infection/hematoma. s/p LLE endarterectomy. ESRD. PCN allergy  - appreciate vascular eval  - agree with vancomycin 1gm post HD check level today dose given 4/19  - agree with aztreonam [id number] gnr resistant coverage  - fu cultures  - monitor temps  - if doesnt improve may need washout  - tolerating abx well so far; no side effects noted  - reason for abx use and side effects reviewed with patient  - supportive care  - f/u cbc    2. other issues - care per medicine

## 2019-04-20 NOTE — CHART NOTE - NSCHARTNOTEFT_GEN_A_CORE
Pt seen and examined with house staff.  Plan formulated and reviewed on rounds     Briefly, 83 y/o male with ESRD on HD (MWF), HTN, RA on MTX and steroids, R leg amputation, severe CDI and megacolon, Severe PAD s/p LLE endarterectomy 2 weeks ago with dr dugan admitted with post op infection/sepsis.    Pancytopenic   On Aztreonam   No events overnight     Stable vitals. No Fevers  NAD  Awake and alert    L groin post op infection  Severe PAD on coumadin  Pancytopenia    Cont with aztreonam  Appreciate Dr amador input  Will hold tonight's dose of MTX (2.5mg every Sat night)--Follow CBC  Hold coumadin tonight--Follow INR  RRT as per renal

## 2019-04-20 NOTE — CONSULT NOTE ADULT - SUBJECTIVE AND OBJECTIVE BOX
Patient is a 84y old  Male who presents with a chief complaint of L groin DC (2019 09:11)    HPI:  85 y/o male with ESRD on HD (MWF), HTN, RA on MTX and steroids, R leg amputation, severe CDI and megacolon, Severe PAD s/p LLE endarterectomy 2 weeks ago with dr dugan presents with T 100 and DC from L groin.  Pt also c/o R arm pain/swelling where pt had fistulogram of the AC fistula on last admission.  RUL dopplers negative for DVT.  L groin sono--negative for pseudo aneurysm and + hematoma, eval [facility] vascular surgery noted to have seropurulent drainage from L groin along medial flap and erythema along wound edges concerning for superimposed infection.  Pt given 2.1 L IVF, IV vanco/aztreonam in ED.       PMH: as above  PSH: as above  Meds: per reconciliation sheet, noted below  MEDICATIONS  (STANDING):  allopurinol 100 milliGRAM(s) Oral <User Schedule>  aspirin  chewable 81 milliGRAM(s) Oral daily  atorvastatin 20 milliGRAM(s) Oral at bedtime  aztreonam  IVPB 500 milliGRAM(s) IV Intermittent every 12 hours  cholestyramine Powder (Sugar-Free) 4 Gram(s) Oral daily  collagenase Ointment 1 Application(s) Topical two times a day  diltiazem    Tablet 60 milliGRAM(s) Oral four times a day  doxercalciferol Injectable 1 MICROGram(s) IV Push <User Schedule>  epoetin nelly Injectable 75495 Unit(s) IV Push <User Schedule>  finasteride 5 milliGRAM(s) Oral daily  lidocaine/prilocaine Cream 1 Application(s) Topical daily  methotrexate 10 milliGRAM(s) Oral <User Schedule>  pantoprazole    Tablet 40 milliGRAM(s) Oral before breakfast  predniSONE   Tablet 5 milliGRAM(s) Oral every other day  predniSONE   Tablet 2.5 milliGRAM(s) Oral every other day  sevelamer hydrochloride Oral Tab/Cap - Peds 800 milliGRAM(s) Oral three times a day with meals  silver sulfADIAZINE 1% Cream 1 Application(s) Topical daily      Allergies    Zosyn (Rash)    Intolerances      Social: no smoking, no alcohol, no illegal drugs; no recent travel, no exposure to TB  FAMILY HISTORY:  Family history of colorectal cancer (Father)  Family history of diabetes mellitus (Mother, Sibling)  Family history of hypertension (Mother)     no history of premature cardiovascular disease in first degree relatives    ROS: the patient denies fever, no chills, no HA, no dizziness, no sore throat, no blurry vision, no CP, no palpitations, no SOB, no cough, no abdominal pain, no diarrhea, no N/V, no dysuria, no leg pain, no claudication, no rash, no joint aches, no rectal pain or bleeding, no night sweats  All other systems reviewed and are negative    Vital Signs Last 24 Hrs  T(C): 37.3 (2019 04:49), Max: 37.9 (2019 21:19)  T(F): 99.1 (2019 04:49), Max: 100.3 (2019 21:19)  HR: 85 (2019 04:49) (83 - 94)  BP: 130/45 (2019 04:49) (122/44 - 167/60)  BP(mean): --  RR: 18 (2019 04:49) (16 - 18)  SpO2: 100% (2019 04:49) (100% - 100%)  Daily Height in cm: 170.18 (2019 18:40)    Daily     PE:    Constitutional: frail looking  HEENT: NC/AT, EOMI, PERRLA, conjunctivae clear; ears and nose atraumatic; pharynx benign  Neck: supple; thyroid not palpable  Back: no tenderness  Respiratory: respiratory effort normal; clear to auscultation  Cardiovascular: S1S2 regular, no murmurs  Abdomen: soft, not tender, not distended, positive BS; liver and spleen WNL  Genitourinary: no suprapubic tenderness  Lymphatic: no LN palpable  Musculoskeletal: no muscle tenderness, no joint swelling or tenderness  Extremities: R fistula   Neurological/ Psychiatric:  moving all extremities  Skin: L groin hematoma, ecchymosis, erythema, drainage no palpable lesions    Labs: all available labs reviewed                        8.1    2.05  )-----------( 54       ( 2019 07:24 )             25.2     04-20    141  |  108  |  27<H>  ----------------------------<  88  4.1   |  26  |  2.81<H>    Ca    8.1<L>      2019 07:24  Phos  3.7     -    TPro  5.5<L>  /  Alb  2.3<L>  /  TBili  0.5  /  DBili  x   /  AST  25  /  ALT  40  /  AlkPhos  85  04-     LIVER FUNCTIONS - ( 2019 10:37 )  Alb: 2.3 g/dL / Pro: 5.5 gm/dL / ALK PHOS: 85 U/L / ALT: 40 U/L / AST: 25 U/L / GGT: x           Urinalysis Basic - ( 2019 04:15 )    Color: Yellow / Appearance: Clear / S.020 / pH: x  Gluc: x / Ketone: Negative  / Bili: Moderate / Urobili: 1 mg/dL   Blood: x / Protein: 100 mg/dL / Nitrite: Negative   Leuk Esterase: Small / RBC: Negative /HPF / WBC 3-5   Sq Epi: x / Non Sq Epi: Negative / Bacteria: Few          Radiology: all available radiological tests reviewed    EXAM:  US DPLX UPR EXT VEINS LTD RT                            PROCEDURE DATE:  2019          INTERPRETATION:  CLINICAL INFORMATION: Right arm swelling    COMPARISON: None available.    TECHNIQUE: Duplex sonography of the RIGHT UPPER extremity with color and   spectral Doppler, with and without compression.      FINDINGS:    The right internal jugular, subclavian, axillary, brachial, basilic and   cephalic veins are patent and compressible where applicable.     Doppler examination shows normal spontaneous and phasic flow.    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  Markedly increased velocities within the patient's arteriovenous fistula   raising suspicious for hemodynamically significant stenosis.    < end of copied text >    Advanced directives addressed: full resuscitation

## 2019-04-21 DIAGNOSIS — I73.9 PERIPHERAL VASCULAR DISEASE, UNSPECIFIED: ICD-10-CM

## 2019-04-21 DIAGNOSIS — A04.71 ENTEROCOLITIS DUE TO CLOSTRIDIUM DIFFICILE, RECURRENT: ICD-10-CM

## 2019-04-21 DIAGNOSIS — M79.601 PAIN IN RIGHT ARM: ICD-10-CM

## 2019-04-21 DIAGNOSIS — I50.32 CHRONIC DIASTOLIC (CONGESTIVE) HEART FAILURE: ICD-10-CM

## 2019-04-21 DIAGNOSIS — T81.49XA INFECTION FOLLOWING A PROCEDURE, OTHER SURGICAL SITE, INITIAL ENCOUNTER: ICD-10-CM

## 2019-04-21 DIAGNOSIS — M06.9 RHEUMATOID ARTHRITIS, UNSPECIFIED: ICD-10-CM

## 2019-04-21 DIAGNOSIS — D61.818 OTHER PANCYTOPENIA: ICD-10-CM

## 2019-04-21 DIAGNOSIS — N18.9 CHRONIC KIDNEY DISEASE, UNSPECIFIED: ICD-10-CM

## 2019-04-21 DIAGNOSIS — I48.91 UNSPECIFIED ATRIAL FIBRILLATION: ICD-10-CM

## 2019-04-21 LAB
ANION GAP SERPL CALC-SCNC: 11 MMOL/L — SIGNIFICANT CHANGE UP (ref 5–17)
APTT BLD: 39.9 SEC — HIGH (ref 27.5–36.3)
BASOPHILS # BLD AUTO: 0 K/UL — SIGNIFICANT CHANGE UP (ref 0–0.2)
BASOPHILS NFR BLD AUTO: 0 % — SIGNIFICANT CHANGE UP (ref 0–2)
BLD GP AB SCN SERPL QL: SIGNIFICANT CHANGE UP
BUN SERPL-MCNC: 43 MG/DL — HIGH (ref 7–23)
CALCIUM SERPL-MCNC: 7.6 MG/DL — LOW (ref 8.5–10.1)
CHLORIDE SERPL-SCNC: 107 MMOL/L — SIGNIFICANT CHANGE UP (ref 96–108)
CO2 SERPL-SCNC: 21 MMOL/L — LOW (ref 22–31)
CREAT SERPL-MCNC: 3.93 MG/DL — HIGH (ref 0.5–1.3)
CULTURE RESULTS: SIGNIFICANT CHANGE UP
EOSINOPHIL # BLD AUTO: 0.08 K/UL — SIGNIFICANT CHANGE UP (ref 0–0.5)
EOSINOPHIL NFR BLD AUTO: 4.1 % — SIGNIFICANT CHANGE UP (ref 0–6)
GLUCOSE SERPL-MCNC: 91 MG/DL — SIGNIFICANT CHANGE UP (ref 70–99)
HCT VFR BLD CALC: 25.4 % — LOW (ref 39–50)
HCT VFR BLD CALC: 26 % — LOW (ref 39–50)
HGB BLD-MCNC: 8.2 G/DL — LOW (ref 13–17)
HGB BLD-MCNC: 8.4 G/DL — LOW (ref 13–17)
IMM GRANULOCYTES NFR BLD AUTO: 5.2 % — HIGH (ref 0–1.5)
INR BLD: 2.7 RATIO — HIGH (ref 0.88–1.16)
LYMPHOCYTES # BLD AUTO: 0.46 K/UL — LOW (ref 1–3.3)
LYMPHOCYTES # BLD AUTO: 23.7 % — SIGNIFICANT CHANGE UP (ref 13–44)
MANUAL SMEAR VERIFICATION: SIGNIFICANT CHANGE UP
MCHC RBC-ENTMCNC: 32.3 GM/DL — SIGNIFICANT CHANGE UP (ref 32–36)
MCHC RBC-ENTMCNC: 32.3 GM/DL — SIGNIFICANT CHANGE UP (ref 32–36)
MCHC RBC-ENTMCNC: 32.6 PG — SIGNIFICANT CHANGE UP (ref 27–34)
MCHC RBC-ENTMCNC: 32.9 PG — SIGNIFICANT CHANGE UP (ref 27–34)
MCV RBC AUTO: 100.8 FL — HIGH (ref 80–100)
MCV RBC AUTO: 102 FL — HIGH (ref 80–100)
MONOCYTES # BLD AUTO: 0.18 K/UL — SIGNIFICANT CHANGE UP (ref 0–0.9)
MONOCYTES NFR BLD AUTO: 9.3 % — SIGNIFICANT CHANGE UP (ref 2–14)
NEUTROPHILS # BLD AUTO: 1.12 K/UL — LOW (ref 1.8–7.4)
NEUTROPHILS NFR BLD AUTO: 57.7 % — SIGNIFICANT CHANGE UP (ref 43–77)
NRBC # BLD: 0 /100 WBCS — SIGNIFICANT CHANGE UP (ref 0–0)
NRBC # BLD: 0 /100 WBCS — SIGNIFICANT CHANGE UP (ref 0–0)
PHOSPHATE SERPL-MCNC: 2.8 MG/DL — SIGNIFICANT CHANGE UP (ref 2.5–4.5)
PLAT MORPH BLD: NORMAL — SIGNIFICANT CHANGE UP
PLATELET # BLD AUTO: 50 K/UL — LOW (ref 150–400)
PLATELET # BLD AUTO: 62 K/UL — LOW (ref 150–400)
POLYCHROMASIA BLD QL SMEAR: SLIGHT — SIGNIFICANT CHANGE UP
POTASSIUM SERPL-MCNC: 3.9 MMOL/L — SIGNIFICANT CHANGE UP (ref 3.5–5.3)
POTASSIUM SERPL-SCNC: 3.9 MMOL/L — SIGNIFICANT CHANGE UP (ref 3.5–5.3)
PROTHROM AB SERPL-ACNC: 30.9 SEC — HIGH (ref 10–12.9)
RBC # BLD: 2.49 M/UL — LOW (ref 4.2–5.8)
RBC # BLD: 2.58 M/UL — LOW (ref 4.2–5.8)
RBC # FLD: 19.1 % — HIGH (ref 10.3–14.5)
RBC # FLD: 19.3 % — HIGH (ref 10.3–14.5)
RBC BLD AUTO: SIGNIFICANT CHANGE UP
ROULEAUX BLD QL SMEAR: PRESENT
SODIUM SERPL-SCNC: 139 MMOL/L — SIGNIFICANT CHANGE UP (ref 135–145)
SPECIMEN SOURCE: SIGNIFICANT CHANGE UP
TYPE + AB SCN PNL BLD: SIGNIFICANT CHANGE UP
WBC # BLD: 1.94 K/UL — LOW (ref 3.8–10.5)
WBC # BLD: 2.81 K/UL — LOW (ref 3.8–10.5)
WBC # FLD AUTO: 1.94 K/UL — LOW (ref 3.8–10.5)
WBC # FLD AUTO: 2.81 K/UL — LOW (ref 3.8–10.5)

## 2019-04-21 PROCEDURE — 71045 X-RAY EXAM CHEST 1 VIEW: CPT | Mod: 26

## 2019-04-21 RX ORDER — VANCOMYCIN HCL 1 G
1000 VIAL (EA) INTRAVENOUS ONCE
Qty: 0 | Refills: 0 | Status: COMPLETED | OUTPATIENT
Start: 2019-04-21 | End: 2019-04-21

## 2019-04-21 RX ORDER — OXYCODONE AND ACETAMINOPHEN 5; 325 MG/1; MG/1
1 TABLET ORAL EVERY 6 HOURS
Qty: 0 | Refills: 0 | Status: DISCONTINUED | OUTPATIENT
Start: 2019-04-21 | End: 2019-04-22

## 2019-04-21 RX ORDER — WARFARIN SODIUM 2.5 MG/1
3 TABLET ORAL AT BEDTIME
Qty: 0 | Refills: 0 | Status: DISCONTINUED | OUTPATIENT
Start: 2019-04-21 | End: 2019-04-21

## 2019-04-21 RX ORDER — OXYMETAZOLINE HYDROCHLORIDE 0.5 MG/ML
2 SPRAY NASAL ONCE
Qty: 0 | Refills: 0 | Status: COMPLETED | OUTPATIENT
Start: 2019-04-21 | End: 2019-04-21

## 2019-04-21 RX ADMIN — OXYCODONE AND ACETAMINOPHEN 1 TABLET(S): 5; 325 TABLET ORAL at 14:21

## 2019-04-21 RX ADMIN — CHOLESTYRAMINE 4 GRAM(S): 4 POWDER, FOR SUSPENSION ORAL at 12:27

## 2019-04-21 RX ADMIN — OXYCODONE AND ACETAMINOPHEN 1 TABLET(S): 5; 325 TABLET ORAL at 15:00

## 2019-04-21 RX ADMIN — Medication 60 MILLIGRAM(S): at 12:27

## 2019-04-21 RX ADMIN — OXYCODONE AND ACETAMINOPHEN 1 TABLET(S): 5; 325 TABLET ORAL at 23:08

## 2019-04-21 RX ADMIN — Medication 81 MILLIGRAM(S): at 12:27

## 2019-04-21 RX ADMIN — Medication 50 MILLIGRAM(S): at 06:00

## 2019-04-21 RX ADMIN — ATORVASTATIN CALCIUM 20 MILLIGRAM(S): 80 TABLET, FILM COATED ORAL at 23:10

## 2019-04-21 RX ADMIN — SEVELAMER CARBONATE 800 MILLIGRAM(S): 2400 POWDER, FOR SUSPENSION ORAL at 08:57

## 2019-04-21 RX ADMIN — Medication 650 MILLIGRAM(S): at 06:36

## 2019-04-21 RX ADMIN — Medication 250 MILLIGRAM(S): at 20:40

## 2019-04-21 RX ADMIN — PANTOPRAZOLE SODIUM 40 MILLIGRAM(S): 20 TABLET, DELAYED RELEASE ORAL at 06:00

## 2019-04-21 RX ADMIN — OXYMETAZOLINE HYDROCHLORIDE 2 SPRAY(S): 0.5 SPRAY NASAL at 20:40

## 2019-04-21 RX ADMIN — Medication 125 MILLIGRAM(S): at 06:00

## 2019-04-21 RX ADMIN — Medication 60 MILLIGRAM(S): at 23:09

## 2019-04-21 RX ADMIN — FINASTERIDE 5 MILLIGRAM(S): 5 TABLET, FILM COATED ORAL at 12:27

## 2019-04-21 RX ADMIN — Medication 125 MILLIGRAM(S): at 12:27

## 2019-04-21 RX ADMIN — SEVELAMER CARBONATE 800 MILLIGRAM(S): 2400 POWDER, FOR SUSPENSION ORAL at 17:42

## 2019-04-21 RX ADMIN — Medication 50 MILLIGRAM(S): at 17:14

## 2019-04-21 RX ADMIN — Medication 125 MILLIGRAM(S): at 23:10

## 2019-04-21 RX ADMIN — SEVELAMER CARBONATE 800 MILLIGRAM(S): 2400 POWDER, FOR SUSPENSION ORAL at 12:27

## 2019-04-21 RX ADMIN — Medication 60 MILLIGRAM(S): at 06:00

## 2019-04-21 RX ADMIN — Medication 60 MILLIGRAM(S): at 17:12

## 2019-04-21 RX ADMIN — Medication 125 MILLIGRAM(S): at 17:12

## 2019-04-21 NOTE — PROGRESS NOTE ADULT - ASSESSMENT
85 y/o male with ESRD on HD (MWF), HTN, RA on MTX and steroids, R leg amputation, severe CDI and megacolon, Severe PAD s/p LLE endarterectomy (4/1/19) with dr dugan presents with T 100 and drainage from L groin.

## 2019-04-21 NOTE — PROGRESS NOTE ADULT - PROBLEM SELECTOR PLAN 1
Pt who had an endarterectomy April 1st who developed an infection at entry site and fever of 100.5  -now afebrile  -dopplers of the LLE showed hematoma anterior to the left common femoral artery measuring approximately 3.2 x 0.9 x 5.0 cm  -Dr. Clement recommendation appreciated:pt may need debridement of wound site  Pain control w/ percocet as tylenol did not work  -continue aztreonam(pcn allergy) Day #3  -wound care: LLE dressing changes daily with silvadene, non adherent gauze, 4x4, ABD pad, Kerlix and ACE bandage, keep wound clean, apply 4x4s and tape QD and PRN Pt who had an endarterectomy April 1st who developed an infection at entry site and fever of 100.5  -now afebrile  -dopplers of the LLE showed hematoma anterior to the left common femoral artery measuring approximately 3.2 x 0.9 x 5.0 cm  -Dr. Clement recommendation appreciated:pt may need debridement of wound site  Pain control w/ percocet as tylenol did not work  -continue aztreonam(pcn allergy) Day #3  -wound care: LLE dressing changes daily with silvadene, non adherent gauze, 4x4, ABD pad, Kerlix and ACE bandage, keep wound clean, apply 4x4s and tape QD and PRN  -f/u wound culture

## 2019-04-21 NOTE — PROGRESS NOTE ADULT - PROBLEM SELECTOR PLAN 2
- s/p R arm fistula revision with fistulogram on March 28, 2019 by Dr. Urbano CURRIE venous doppler - no evidence of DVT; markedly increased velocity in AV fistula of arm raising suspicion of hemodynamically significant stenosis  - Vascular surgery recs appreciated. Will observe during dialysis on Monday

## 2019-04-21 NOTE — PROVIDER CONTACT NOTE (CHANGE IN STATUS NOTIFICATION) - SITUATION
pt at change of shift noted to have left sided nose bleed. pt also spitting up blood and reported going through multiple boxes of tissues due to nose bleed. pressure applied to nose.

## 2019-04-21 NOTE — PROGRESS NOTE ADULT - SUBJECTIVE AND OBJECTIVE BOX
low grade temp 100.7 F, vitals stable    no L foot pain but wound pain    PE  L groin incision slightly better but still some drainage.  Cultured today but suspect more deep infection and that operative wash out and debridement and potentially muscle flap coverage may be necessary  wound on posterior L calf relatively clean  foot not compromised but clearly not well perfused    A/P  will re evaluate tomorrrow but suspect will need debridement of incision    MEDICATIONS  (STANDING):  allopurinol 100 milliGRAM(s) Oral <User Schedule>  aspirin  chewable 81 milliGRAM(s) Oral daily  atorvastatin 20 milliGRAM(s) Oral at bedtime  aztreonam  IVPB 500 milliGRAM(s) IV Intermittent every 12 hours  cholestyramine Powder (Sugar-Free) 4 Gram(s) Oral daily  collagenase Ointment 1 Application(s) Topical two times a day  diltiazem    Tablet 60 milliGRAM(s) Oral four times a day  doxercalciferol Injectable 1 MICROGram(s) IV Push <User Schedule>  epoetin nelly Injectable 39040 Unit(s) IV Push <User Schedule>  finasteride 5 milliGRAM(s) Oral daily  lidocaine/prilocaine Cream 1 Application(s) Topical daily  pantoprazole    Tablet 40 milliGRAM(s) Oral before breakfast  predniSONE   Tablet 5 milliGRAM(s) Oral every other day  predniSONE   Tablet 2.5 milliGRAM(s) Oral every other day  sevelamer hydrochloride Oral Tab/Cap - Peds 800 milliGRAM(s) Oral three times a day with meals  silver sulfADIAZINE 1% Cream 1 Application(s) Topical daily  vancomycin    Solution 125 milliGRAM(s) Oral every 6 hours    MEDICATIONS  (PRN):  acetaminophen   Tablet .. 650 milliGRAM(s) Oral every 6 hours PRN Mild Pain (1 - 3)  acetaminophen   Tablet .. 650 milliGRAM(s) Oral every 6 hours PRN Temp greater or equal to 38.5C (101.3F), Moderate Pain (4 - 6)      Allergies    Zosyn (Rash)    Intolerances        Flatus: [ ] YES [ ] NO             Bowel Movement: [ ] YES [ ] NO  Pain (0-10):            Pain Control Adequate: [ ] YES [ ] NO  Nausea: [ ] YES [ ] NO            Vomiting: [ ] YES [ ] NO  Diarrhea: [ ] YES [ ] NO         Constipation: [ ] YES [ ] NO     Chest Pain: [ ] YES [ ] NO    SOB:  [ ] YES [ ] NO    Vital Signs Last 24 Hrs  T(C): 36.6 (21 Apr 2019 05:11), Max: 38.2 (20 Apr 2019 18:08)  T(F): 97.8 (21 Apr 2019 05:11), Max: 100.7 (20 Apr 2019 18:08)  HR: 74 (21 Apr 2019 05:11) (68 - 93)  BP: 136/48 (21 Apr 2019 05:11) (126/87 - 148/48)  BP(mean): --  RR: 18 (21 Apr 2019 05:11) (16 - 18)  SpO2: 98% (21 Apr 2019 05:11) (95% - 98%)    I&O's Summary      Physical Exam:  General: NAD, resting comfortably  Pulmonary: normal resp effort, CTA-B  Cardiovascular: NSR  Abdominal: soft, NT/ND  Extremities: WWP, normal strength  Neuro: A/O x 3, CNs II-XII grossly intact, normal motor/sensation, no focal deficits  Pulses:   Right:                                                                          Left:  FEM [ ]2+ [ ]1+ [ ]doppler                                             FEM [ ]2+ [ ]1+ [ ]doppler    POP [ ]2+ [ ]1+ [ ]doppler                                             POP [ ]2+ [ ]1+ [ ]doppler    DP [ ]2+ [ ]1+ [ ]doppler                                                DP [ ]2+ [ ]1+ [ ]doppler  PT[ ]2+ [ ]1+ [ ]doppler                                                  PT [ ]2+ [ ]1+ [ ]doppler    LABS:                        8.2    x     )-----------( 50       ( 21 Apr 2019 06:47 )             25.4     04-21    139  |  107  |  43<H>  ----------------------------<  91  3.9   |  21<L>  |  3.93<H>    Ca    7.6<L>      21 Apr 2019 06:47  Phos  2.8     04-21    TPro  5.5<L>  /  Alb  2.3<L>  /  TBili  0.5  /  DBili  x   /  AST  25  /  ALT  40  /  AlkPhos  85  04-19    PT/INR - ( 21 Apr 2019 06:47 )   PT: 30.9 sec;   INR: 2.70 ratio         PTT - ( 21 Apr 2019 06:47 )  PTT:39.9 sec    LIVER FUNCTIONS - ( 19 Apr 2019 10:37 )  Alb: 2.3 g/dL / Pro: 5.5 gm/dL / ALK PHOS: 85 U/L / ALT: 40 U/L / AST: 25 U/L / GGT: x           CAPILLARY BLOOD GLUCOSE          RADIOLOGY & ADDITIONAL TESTS:

## 2019-04-21 NOTE — PROVIDER CONTACT NOTE (CHANGE IN STATUS NOTIFICATION) - ACTION/TREATMENT ORDERED:
STAT CBC  STAT chest xray  oxymetazoline 0.05% nasal spray, 2 sprays to left nostril once and hold pressure

## 2019-04-21 NOTE — CHART NOTE - NSCHARTNOTEFT_GEN_A_CORE
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     Pt reports left-sided epistaxis started earlier today. Pt has been spitting up small amounts of blood as well. No cough. pt has never had this type of episode prior to today.     VSS  Nose/throat: left nostril with blood clot, throat without any blood seen   CV: RRR, nl S1/s2, no M/R/G  Pulm: CTAB    Stat CXR: opacity in RLL likely vasodilation unchanged from previous films (awaiting offical read)    a/p: 84M w/ epistaxis and secondary hemoptysis likely from noseblood, epistaxis likely secondary to thrombocytopenia from MTX use   - stable  - H/H stat  - oxymetazoline spray + pressure   - may need rapid rhino if spray does not stop bleeding     d/w Dr. Walden, PGY3

## 2019-04-21 NOTE — CHART NOTE - NSCHARTNOTEFT_GEN_A_CORE
Pt seen and examined with house staff.  Plan formulated and reviewed on rounds    Briefly, 83 y/o male with ESRD on HD (MWF), HTN, RA on MTX and steroids, R leg amputation, severe CDI and megacolon, Severe PAD s/p LLE endarterectomy 2 weeks ago with dr dugan admitted with post op infection/sepsis at surgical site     Pancytopenic--stable today   On Aztreonam and PO vanco    No events overnight. C/O pain at post op site    Stable vitals. No Fevers  NAD  Awake and alert  RLE amputation     L groin post op infection  Severe PAD on coumadin  Pancytopenia--stable--?? related to MTX    Cont with aztreonam--agree with PO vanco  Appreciate Dr amador input  Start low dose percocet/oxy for pain   Will hold tonight's dose of MTX (2.5mg every Sat night)--Follow CBC  Dose coumadin to INR   RRT as per renal  OOB

## 2019-04-22 LAB
ANION GAP SERPL CALC-SCNC: 9 MMOL/L — SIGNIFICANT CHANGE UP (ref 5–17)
BUN SERPL-MCNC: 58 MG/DL — HIGH (ref 7–23)
CALCIUM SERPL-MCNC: 7.1 MG/DL — LOW (ref 8.5–10.1)
CHLORIDE SERPL-SCNC: 107 MMOL/L — SIGNIFICANT CHANGE UP (ref 96–108)
CO2 SERPL-SCNC: 22 MMOL/L — SIGNIFICANT CHANGE UP (ref 22–31)
CREAT SERPL-MCNC: 4.85 MG/DL — HIGH (ref 0.5–1.3)
GLUCOSE SERPL-MCNC: 135 MG/DL — HIGH (ref 70–99)
HCT VFR BLD CALC: 22.5 % — LOW (ref 39–50)
HCT VFR BLD CALC: 22.5 % — LOW (ref 39–50)
HGB BLD-MCNC: 7.2 G/DL — LOW (ref 13–17)
HGB BLD-MCNC: 7.4 G/DL — LOW (ref 13–17)
INR BLD: 2.59 RATIO — HIGH (ref 0.88–1.16)
MCHC RBC-ENTMCNC: 32 GM/DL — SIGNIFICANT CHANGE UP (ref 32–36)
MCHC RBC-ENTMCNC: 32.4 PG — SIGNIFICANT CHANGE UP (ref 27–34)
MCV RBC AUTO: 101.4 FL — HIGH (ref 80–100)
NRBC # BLD: 1 /100 WBCS — HIGH (ref 0–0)
PLATELET # BLD AUTO: 59 K/UL — LOW (ref 150–400)
POTASSIUM SERPL-MCNC: 4.1 MMOL/L — SIGNIFICANT CHANGE UP (ref 3.5–5.3)
POTASSIUM SERPL-SCNC: 4.1 MMOL/L — SIGNIFICANT CHANGE UP (ref 3.5–5.3)
PROTHROM AB SERPL-ACNC: 29.6 SEC — HIGH (ref 10–12.9)
RBC # BLD: 2.22 M/UL — LOW (ref 4.2–5.8)
RBC # FLD: 19.3 % — HIGH (ref 10.3–14.5)
SODIUM SERPL-SCNC: 138 MMOL/L — SIGNIFICANT CHANGE UP (ref 135–145)
WBC # BLD: 2.62 K/UL — LOW (ref 3.8–10.5)
WBC # FLD AUTO: 2.62 K/UL — LOW (ref 3.8–10.5)

## 2019-04-22 RX ORDER — PHYTONADIONE (VIT K1) 5 MG
5 TABLET ORAL ONCE
Qty: 0 | Refills: 0 | Status: COMPLETED | OUTPATIENT
Start: 2019-04-22 | End: 2019-04-22

## 2019-04-22 RX ORDER — VANCOMYCIN HCL 1 G
1000 VIAL (EA) INTRAVENOUS
Qty: 0 | Refills: 0 | Status: DISCONTINUED | OUTPATIENT
Start: 2019-04-22 | End: 2019-04-25

## 2019-04-22 RX ORDER — OXYCODONE AND ACETAMINOPHEN 5; 325 MG/1; MG/1
2 TABLET ORAL EVERY 6 HOURS
Qty: 0 | Refills: 0 | Status: DISCONTINUED | OUTPATIENT
Start: 2019-04-22 | End: 2019-04-29

## 2019-04-22 RX ORDER — ACETAMINOPHEN 500 MG
650 TABLET ORAL EVERY 6 HOURS
Qty: 0 | Refills: 0 | Status: DISCONTINUED | OUTPATIENT
Start: 2019-04-22 | End: 2019-05-06

## 2019-04-22 RX ORDER — OXYMETAZOLINE HYDROCHLORIDE 0.5 MG/ML
2 SPRAY NASAL ONCE
Qty: 0 | Refills: 0 | Status: COMPLETED | OUTPATIENT
Start: 2019-04-22 | End: 2019-04-22

## 2019-04-22 RX ADMIN — LIDOCAINE AND PRILOCAINE CREAM 1 APPLICATION(S): 25; 25 CREAM TOPICAL at 07:02

## 2019-04-22 RX ADMIN — Medication 50 MILLIGRAM(S): at 18:45

## 2019-04-22 RX ADMIN — Medication 125 MILLIGRAM(S): at 23:24

## 2019-04-22 RX ADMIN — PANTOPRAZOLE SODIUM 40 MILLIGRAM(S): 20 TABLET, DELAYED RELEASE ORAL at 06:08

## 2019-04-22 RX ADMIN — CHOLESTYRAMINE 4 GRAM(S): 4 POWDER, FOR SUSPENSION ORAL at 16:11

## 2019-04-22 RX ADMIN — ATORVASTATIN CALCIUM 20 MILLIGRAM(S): 80 TABLET, FILM COATED ORAL at 21:33

## 2019-04-22 RX ADMIN — ERYTHROPOIETIN 10000 UNIT(S): 10000 INJECTION, SOLUTION INTRAVENOUS; SUBCUTANEOUS at 11:37

## 2019-04-22 RX ADMIN — Medication 5 MILLIGRAM(S): at 15:00

## 2019-04-22 RX ADMIN — Medication 100 MILLIGRAM(S): at 14:49

## 2019-04-22 RX ADMIN — DOXERCALCIFEROL 1 MICROGRAM(S): 2.5 CAPSULE ORAL at 11:38

## 2019-04-22 RX ADMIN — OXYMETAZOLINE HYDROCHLORIDE 2 SPRAY(S): 0.5 SPRAY NASAL at 20:09

## 2019-04-22 RX ADMIN — SEVELAMER CARBONATE 800 MILLIGRAM(S): 2400 POWDER, FOR SUSPENSION ORAL at 17:29

## 2019-04-22 RX ADMIN — Medication 125 MILLIGRAM(S): at 18:45

## 2019-04-22 RX ADMIN — Medication 5 MILLIGRAM(S): at 15:04

## 2019-04-22 RX ADMIN — Medication 50 MILLIGRAM(S): at 06:10

## 2019-04-22 RX ADMIN — Medication 325 MILLIGRAM(S): at 09:37

## 2019-04-22 RX ADMIN — Medication 60 MILLIGRAM(S): at 14:52

## 2019-04-22 RX ADMIN — Medication 81 MILLIGRAM(S): at 14:48

## 2019-04-22 RX ADMIN — Medication 125 MILLIGRAM(S): at 06:08

## 2019-04-22 RX ADMIN — Medication 125 MILLIGRAM(S): at 15:00

## 2019-04-22 RX ADMIN — Medication 60 MILLIGRAM(S): at 06:09

## 2019-04-22 RX ADMIN — Medication 250 MILLIGRAM(S): at 17:27

## 2019-04-22 RX ADMIN — OXYCODONE AND ACETAMINOPHEN 1 TABLET(S): 5; 325 TABLET ORAL at 10:23

## 2019-04-22 RX ADMIN — OXYCODONE AND ACETAMINOPHEN 1 TABLET(S): 5; 325 TABLET ORAL at 09:33

## 2019-04-22 RX ADMIN — Medication 60 MILLIGRAM(S): at 23:24

## 2019-04-22 RX ADMIN — Medication 60 MILLIGRAM(S): at 18:45

## 2019-04-22 RX ADMIN — OXYCODONE AND ACETAMINOPHEN 2 TABLET(S): 5; 325 TABLET ORAL at 14:47

## 2019-04-22 RX ADMIN — FINASTERIDE 5 MILLIGRAM(S): 5 TABLET, FILM COATED ORAL at 14:49

## 2019-04-22 RX ADMIN — Medication 1 APPLICATION(S): at 16:16

## 2019-04-22 RX ADMIN — OXYCODONE AND ACETAMINOPHEN 2 TABLET(S): 5; 325 TABLET ORAL at 15:47

## 2019-04-22 NOTE — PROGRESS NOTE ADULT - SUBJECTIVE AND OBJECTIVE BOX
afebrile, VSS    INR 2.7    epistaxis noted    L groin unchanged    for dialysis today    will discuss with Plastics debridement and potentially muscle flap coverage    MEDICATIONS  (STANDING):  allopurinol 100 milliGRAM(s) Oral <User Schedule>  aspirin  chewable 81 milliGRAM(s) Oral daily  atorvastatin 20 milliGRAM(s) Oral at bedtime  aztreonam  IVPB 500 milliGRAM(s) IV Intermittent every 12 hours  cholestyramine Powder (Sugar-Free) 4 Gram(s) Oral daily  collagenase Ointment 1 Application(s) Topical two times a day  diltiazem    Tablet 60 milliGRAM(s) Oral four times a day  doxercalciferol Injectable 1 MICROGram(s) IV Push <User Schedule>  epoetin nelly Injectable 15563 Unit(s) IV Push <User Schedule>  finasteride 5 milliGRAM(s) Oral daily  lidocaine/prilocaine Cream 1 Application(s) Topical daily  pantoprazole    Tablet 40 milliGRAM(s) Oral before breakfast  predniSONE   Tablet 5 milliGRAM(s) Oral every other day  predniSONE   Tablet 2.5 milliGRAM(s) Oral every other day  sevelamer hydrochloride Oral Tab/Cap - Peds 800 milliGRAM(s) Oral three times a day with meals  silver sulfADIAZINE 1% Cream 1 Application(s) Topical daily  vancomycin    Solution 125 milliGRAM(s) Oral every 6 hours    MEDICATIONS  (PRN):  acetaminophen   Tablet .. 650 milliGRAM(s) Oral every 6 hours PRN Mild Pain (1 - 3)  acetaminophen   Tablet .. 650 milliGRAM(s) Oral every 6 hours PRN Temp greater or equal to 38.5C (101.3F), Moderate Pain (4 - 6)  oxyCODONE    5 mG/acetaminophen 325 mG 1 Tablet(s) Oral every 6 hours PRN Moderate Pain (4 - 6)      Allergies    Zosyn (Rash)    Intolerances        Flatus: [ ] YES [ ] NO             Bowel Movement: [ ] YES [ ] NO  Pain (0-10):            Pain Control Adequate: [ ] YES [ ] NO  Nausea: [ ] YES [ ] NO            Vomiting: [ ] YES [ ] NO  Diarrhea: [ ] YES [ ] NO         Constipation: [ ] YES [ ] NO     Chest Pain: [ ] YES [ ] NO    SOB:  [ ] YES [ ] NO    Vital Signs Last 24 Hrs  T(C): 36.9 (22 Apr 2019 06:05), Max: 37.3 (21 Apr 2019 23:10)  T(F): 98.5 (22 Apr 2019 06:05), Max: 99.1 (21 Apr 2019 23:10)  HR: 81 (22 Apr 2019 06:05) (78 - 95)  BP: 125/46 (22 Apr 2019 06:05) (125/46 - 142/52)  BP(mean): --  RR: 18 (22 Apr 2019 06:05) (18 - 20)  SpO2: 96% (22 Apr 2019 06:05) (95% - 98%)    I&O's Summary      Physical Exam:  General: NAD, resting comfortably  Pulmonary: normal resp effort, CTA-B  Cardiovascular: NSR  Abdominal: soft, NT/ND  Extremities: WWP, normal strength  Neuro: A/O x 3, CNs II-XII grossly intact, normal motor/sensation, no focal deficits  Pulses:   Right:                                                                          Left:  FEM [ ]2+ [ ]1+ [ ]doppler                                             FEM [ ]2+ [ ]1+ [ ]doppler    POP [ ]2+ [ ]1+ [ ]doppler                                             POP [ ]2+ [ ]1+ [ ]doppler    DP [ ]2+ [ ]1+ [ ]doppler                                                DP [ ]2+ [ ]1+ [ ]doppler  PT[ ]2+ [ ]1+ [ ]doppler                                                  PT [ ]2+ [ ]1+ [ ]doppler    LABS:                        8.4    2.81  )-----------( 62       ( 21 Apr 2019 20:01 )             26.0     04-21    139  |  107  |  43<H>  ----------------------------<  91  3.9   |  21<L>  |  3.93<H>    Ca    7.6<L>      21 Apr 2019 06:47  Phos  2.8     04-21      PT/INR - ( 21 Apr 2019 06:47 )   PT: 30.9 sec;   INR: 2.70 ratio         PTT - ( 21 Apr 2019 06:47 )  PTT:39.9 sec      CAPILLARY BLOOD GLUCOSE          RADIOLOGY & ADDITIONAL TESTS:

## 2019-04-22 NOTE — PROVIDER CONTACT NOTE (OTHER) - SITUATION
patient actively bleeding from the left nostril. Nasal spray sprayed. Ice pack applied.Patient still bleeding

## 2019-04-22 NOTE — PROGRESS NOTE ADULT - ASSESSMENT
85 y/o male with ESRD on HD (MWF), HTN, RA on MTX and steroids, R leg amputation, severe CDI and megacolon, Severe PAD s/p LLE endarterectomy 2 weeks ago with dr dugan presents with T 100 and DC from L groin.  Pt also c/o R arm pain/swelling where pt had fistulogram of the AC fistula on last admission.  RUL dopplers negative for DVT.  L groin sono--negative for pseudo aneurysm and + hematoma, eval [facility] vascular surgery noted to have seropurulent drainage from L groin along medial flap and erythema along wound edges concerning for superimposed infection.  Pt given 2.1 L IVF, IV vanco/aztreonam in ED.     1. L groin wound infection/hematoma. s/p LLE endarterectomy. ESRD. PCN allergy  - appreciate vascular eval, plan for debridement possible muscle flap in am  - on vancomycin 1gm post HD #4  - on aztreonam [id number] gnr resistant coverage #4  - wound cx growing E faecium/GNRs f/u final cx  - continue with abx coverage  - on c diff prophylaxis with oral vancomycin  - blood cx no growth  - monitor temps  - tolerating abx well so far; no side effects noted  - reason for abx use and side effects reviewed with patient  - supportive care  - f/u cbc    2. other issues - care per medicine

## 2019-04-22 NOTE — PROGRESS NOTE ADULT - ASSESSMENT
85 y/o male with ESRD on HD (MWF), HTN, RA on MTX and steroids, R leg amputation, severe CDI and megacolon, Severe PAD s/p LLE endarterectomy 2 weeks ago with dr dugan presents with T 100 and DC from L groin.  Pt also c/o R arm pain/swelling where pt had fistulogram of the AC fistula on last admission.  RUL dopplers negative for DVT.  L groin sono--negative for pseudo aneurysm and + hematoma.  Pt given 2.1 L IVF, IV vanco/aztreonam in ED  On my exam in HD suite, awake, chronically ill appearing, NAD, daughter at bedside. Pt seen by Dr dugan in ED.  CXR--clear        4/20  s/p hd yesterday  prolonged bleed from access stopped w pressure  feels well  arm less tender  received 1 u prbc on hd yesterday  monitor cbc    4/22 SY  --ESRD : HD order and tx reviewed.   Tolerating tx well.  --AVF : as above.  Prolonged bleeding post tx.  Venous pressure acceptable today.  Monitor AVF function.  --Anemia : with acute loss.  Active infection leading to LETICIA hyporesponsiveness.  Transfuse 2 units today.  --PAD : post Left Ileofemoral Endarterectomy : Monitor Left foot perfusion.  --ID ; Left Groin infection : Continue abtx.  For debridement in am.

## 2019-04-22 NOTE — PROGRESS NOTE ADULT - PROBLEM SELECTOR PLAN 2
- s/p R arm fistula revision with fistulogram on March 28, 2019 by Dr. Urbano CURRIE venous doppler - no evidence of DVT; markedly increased velocity in AV fistula of arm raising suspicion of hemodynamically significant stenosis  - Vascular surgery recs appreciated.   -As per nephro AVF had post tx bleeding that stopped with pressure.

## 2019-04-22 NOTE — PROGRESS NOTE ADULT - PROBLEM SELECTOR PLAN 1
Pt who had an endarterectomy April 1st who developed an infection at entry site and fever of 100.5  -now afebrile  -dopplers of the LLE showed hematoma anterior to the left common femoral artery measuring approximately 3.2 x 0.9 x 5.0 cm  -Dr. Clement recommendation appreciated:pt may need debridement and will go for muscle flap tomorrow with plastic surgery  Pain control w/ percocet as tylenol did not work  -continue aztreonam(pcn allergy) Day #4  -wound care: LLE dressing changes daily with silvadene, non adherent gauze, 4x4, ABD pad, Kerlix and ACE bandage, keep wound clean, apply 4x4s and tape QD and PRN  -f/u cultures

## 2019-04-22 NOTE — PROGRESS NOTE ADULT - SUBJECTIVE AND OBJECTIVE BOX
NEPHROLOGY INTERVAL HPI/OVERNIGHT EVENTS:    Date of Service: 04-22-19 @ 09:38  4/22 SY  Alert.  No acute distress.  Complains of soreness in Left foot.  Continued pain in Left Groin area.  Right Arm and Elbow pain improved.    HPI:  83 y/o male with ESRD on HD (MWF), HTN, RA on MTX and steroids, R leg amputation, severe CDI and megacolon, Severe PAD s/p LLE endarterectomy 2 weeks ago with dr dugan presents with T 100 and DC from L groin.  Pt also c/o R arm pain/swelling where pt had fistulogram of the AC fistula on last admission.  RUL dopplers negative for DVT.  L groin sono--negative for pseudo aneurysm and + hematoma.  Pt given 2.1 L IVF, IV vanco/aztreonam in ED  On my exam in HD suite, awake, chronically ill appearing, NAD, daughter at bedside. Pt seen by Dr dugan in ED.  CXR--clear    TTE (10/18)--mild-mod MR/EF 60%    Pt d/w daughter regarding advance directives--Pt is FULL RESUSCITATION (19 Apr 2019 16:12)      MEDICATIONS  (STANDING):  allopurinol 100 milliGRAM(s) Oral <User Schedule>  aspirin  chewable 81 milliGRAM(s) Oral daily  atorvastatin 20 milliGRAM(s) Oral at bedtime  aztreonam  IVPB 500 milliGRAM(s) IV Intermittent every 12 hours  cholestyramine Powder (Sugar-Free) 4 Gram(s) Oral daily  collagenase Ointment 1 Application(s) Topical two times a day  diltiazem    Tablet 60 milliGRAM(s) Oral four times a day  doxercalciferol Injectable 1 MICROGram(s) IV Push <User Schedule>  epoetin nelly Injectable 87404 Unit(s) IV Push <User Schedule>  finasteride 5 milliGRAM(s) Oral daily  lidocaine/prilocaine Cream 1 Application(s) Topical daily  pantoprazole    Tablet 40 milliGRAM(s) Oral before breakfast  predniSONE   Tablet 5 milliGRAM(s) Oral every other day  predniSONE   Tablet 2.5 milliGRAM(s) Oral every other day  sevelamer hydrochloride Oral Tab/Cap - Peds 800 milliGRAM(s) Oral three times a day with meals  silver sulfADIAZINE 1% Cream 1 Application(s) Topical daily  vancomycin    Solution 125 milliGRAM(s) Oral every 6 hours    MEDICATIONS  (PRN):  acetaminophen   Tablet .. 650 milliGRAM(s) Oral every 6 hours PRN Mild Pain (1 - 3)  acetaminophen   Tablet .. 650 milliGRAM(s) Oral every 6 hours PRN Temp greater or equal to 38.5C (101.3F), Moderate Pain (4 - 6)  oxyCODONE    5 mG/acetaminophen 325 mG 1 Tablet(s) Oral every 6 hours PRN Moderate Pain (4 - 6)      Vital Signs Last 24 Hrs  T(C): 37.1 (22 Apr 2019 09:05), Max: 37.3 (21 Apr 2019 23:10)  T(F): 98.8 (22 Apr 2019 09:05), Max: 99.1 (21 Apr 2019 23:10)  HR: 79 (22 Apr 2019 09:24) (68 - 95)  BP: 126/45 (22 Apr 2019 09:24) (125/46 - 142/52)  BP(mean): --  RR: 17 (22 Apr 2019 09:24) (17 - 20)  SpO2: 96% (22 Apr 2019 06:05) (95% - 98%)    PHYSICAL EXAM:  Alert and comfortable  GENERAL: No apparent distress  CHEST/LUNG: Clear to aus  HEART: S1S2 RRR  ABDOMEN: soft  EXTREMITIES: no edema  SKIN:     LABS:                        7.4    x     )-----------( x        ( 22 Apr 2019 09:05 )             22.5     04-22    138  |  107  |  58<H>  ----------------------------<  135<H>  4.1   |  22  |  4.85<H>    Ca    7.1<L>      22 Apr 2019 07:46  Phos  2.8     04-21      PT/INR - ( 22 Apr 2019 07:46 )   PT: 29.6 sec;   INR: 2.59 ratio         PTT - ( 21 Apr 2019 06:47 )  PTT:39.9 sec            RADIOLOGY & ADDITIONAL TESTS:

## 2019-04-22 NOTE — PROGRESS NOTE ADULT - SUBJECTIVE AND OBJECTIVE BOX
CHIEF COMPLAINT:    SUBJECTIVE:   83 y/o male with ESRD on HD (MWF), HTN, RA on MTX and steroids, R leg amputation, severe CDI and megacolon, Severe PAD s/p LLE endarterectomy (4/1/19)with dr Clement presents with T 100.5 and DC from L groin.  Pt also c/o R arm pain/swelling where pt had fistulogram of the AC fistula on last admission.    4/22 Pt still complaining of left leg pain. Increased percocet to 10. The epistaxis has stopped. Pt will go for procedure tomorrow. Gave vitamin K to help decrease INR to be optimal for procedure tomorrow. Pt received 2 units of PRBCs during dialysis today.  REVIEW OF SYSTEMS:  CONSTITUTIONAL: No weakness, fevers or chills  EYES/ENT: No visual changes;  No vertigo or throat pain   NECK: No pain or stiffness  RESPIRATORY: No cough, wheezing, hemoptysis; No shortness of breath  CARDIOVASCULAR: No chest pain or palpitations  GASTROINTESTINAL: No abdominal or epigastric pain. No nausea, vomiting, or hematemesis; No diarrhea or constipation. No melena or hematochezia.  GENITOURINARY: No dysuria, frequency or hematuria  NEUROLOGICAL: No numbness or weakness  SKIN: No itching, burning, rashes, or lesions   Extremities: Left leg pain  All other review of systems is negative unless indicated above    Vital Signs Last 24 Hrs  T(C): 36.8 (22 Apr 2019 14:17), Max: 37.3 (21 Apr 2019 23:10)  T(F): 98.2 (22 Apr 2019 14:17), Max: 99.1 (21 Apr 2019 23:10)  HR: 98 (22 Apr 2019 14:17) (68 - 99)  BP: 144/45 (22 Apr 2019 14:17) (125/46 - 163/49)  BP(mean): --  RR: 18 (22 Apr 2019 14:17) (17 - 18)  SpO2: 96% (22 Apr 2019 14:17) (95% - 97%)    I&O's Summary    22 Apr 2019 07:01  -  22 Apr 2019 17:16  --------------------------------------------------------  IN: 592 mL / OUT: 0 mL / NET: 592 mL        CAPILLARY BLOOD GLUCOSE          PHYSICAL EXAM:  Constitutional: NAD, awake and alert, well-developed  HEENT: PERR, EOMI, Normal Hearing, MMM  Neck: Soft and supple, No LAD, No JVD  Respiratory: Breath sounds are clear bilaterally, No wheezing, rales or rhonchi  Cardiovascular: S1 and S2, regular rate and rhythm, no Murmurs, gallops or rubs  Gastrointestinal: Bowel Sounds present, soft, nontender, nondistended, no guarding, no rebound  Extremities: No peripheral edema. Right lower leg amputated. Left lower leg wrapped.  Neurological: A/O x 3, no focal deficits  Skin: No rashes. Ecchymosis of upper ext b/l. Dressing at surgical site damp with light brown drainage    MEDICATIONS:  MEDICATIONS  (STANDING):  allopurinol 100 milliGRAM(s) Oral <User Schedule>  aspirin  chewable 81 milliGRAM(s) Oral daily  atorvastatin 20 milliGRAM(s) Oral at bedtime  aztreonam  IVPB 500 milliGRAM(s) IV Intermittent every 12 hours  cholestyramine Powder (Sugar-Free) 4 Gram(s) Oral daily  collagenase Ointment 1 Application(s) Topical two times a day  diltiazem    Tablet 60 milliGRAM(s) Oral four times a day  doxercalciferol Injectable 1 MICROGram(s) IV Push <User Schedule>  epoetin nelly Injectable 92987 Unit(s) IV Push <User Schedule>  finasteride 5 milliGRAM(s) Oral daily  lidocaine/prilocaine Cream 1 Application(s) Topical daily  pantoprazole    Tablet 40 milliGRAM(s) Oral before breakfast  predniSONE   Tablet 5 milliGRAM(s) Oral every other day  sevelamer hydrochloride Oral Tab/Cap - Peds 800 milliGRAM(s) Oral three times a day with meals  silver sulfADIAZINE 1% Cream 1 Application(s) Topical daily  vancomycin    Solution 125 milliGRAM(s) Oral every 6 hours  vancomycin  IVPB 1000 milliGRAM(s) IV Intermittent <User Schedule>      LABS: All Labs Reviewed:                        7.4    x     )-----------( x        ( 22 Apr 2019 09:05 )             22.5     04-22    138  |  107  |  58<H>  ----------------------------<  135<H>  4.1   |  22  |  4.85<H>    Ca    7.1<L>      22 Apr 2019 07:46  Phos  2.8     04-21      PT/INR - ( 22 Apr 2019 07:46 )   PT: 29.6 sec;   INR: 2.59 ratio         PTT - ( 21 Apr 2019 06:47 )  PTT:39.9 sec      Blood Culture: 04-21 @ 08:40  Organism --  Gram Stain Blood -- Gram Stain --  Specimen Source .Abscess Leg - Left  Culture-Blood --    04-19 @ 10:37  Organism --  Gram Stain Blood -- Gram Stain --  Specimen Source .Blood None  Culture-Blood --    04-19 @ 04:15  Organism --  Gram Stain Blood -- Gram Stain --  Specimen Source .Urine Clean Catch (Midstream)  Culture-Blood --        RADIOLOGY/EKG:  < from: US Duplex Arterial Lower Ext Ltd, Left (04.19.19 @ 12:13) >  Impression:    Focal ultrasound of the left groin demonstrates no evidence of   pseudoaneurysm.    Hematoma anterior to the left common femoral artery measuring   approximately 3.2 x 0.9 x 5.0 cm.    There is plaque within the proximal left femoral artery, with an elevated   velocity of 110 cm/s.        < from: US Duplex Venous Upper Ext Ltd, Right (04.19.19 @ 12:16) >  IMPRESSION:     No evidence of right upper extremity deep venous thrombosis.  Markedly increased velocities within the patient's arteriovenous fistula   raising suspicious for hemodynamically significant stenosis.    DVT PPX:  coumadin held  ADVANCED DIRECTIVE:    DISPOSITION:

## 2019-04-22 NOTE — PROGRESS NOTE ADULT - ASSESSMENT
83 y/o male with ESRD on HD (MWF), HTN, RA on MTX and steroids, R leg amputation, severe CDI and megacolon, Severe PAD s/p LLE endarterectomy (4/1/19) with dr dugan presents with T 100 and drainage from L groin.

## 2019-04-22 NOTE — CHART NOTE - NSCHARTNOTEFT_GEN_A_CORE
Patient with persistent epistaxis from left nostril  oxymetazoline ordered and administered and came to evaluate patient.  Area of oozing bleeding noted, area cauterized with silver nitrate and CPA holding pressure for 10 minutes continuously  will re-evaluate  stat cbc to eval platelet count

## 2019-04-22 NOTE — PROGRESS NOTE ADULT - SUBJECTIVE AND OBJECTIVE BOX
Date of service: 19 @ 12:48    pt seen and examined  c/o L groin pain  at HD  no fevers   plan for debridement in am    ROS: no fever or chills; denies dizziness, no HA, no SOB or cough, no abdominal pain, no diarrhea or constipation; no dysuria, no urinary frequency, no legs pain, no rashes    MEDICATIONS  (STANDING):  allopurinol 100 milliGRAM(s) Oral <User Schedule>  aspirin  chewable 81 milliGRAM(s) Oral daily  atorvastatin 20 milliGRAM(s) Oral at bedtime  aztreonam  IVPB 500 milliGRAM(s) IV Intermittent every 12 hours  cholestyramine Powder (Sugar-Free) 4 Gram(s) Oral daily  collagenase Ointment 1 Application(s) Topical two times a day  diltiazem    Tablet 60 milliGRAM(s) Oral four times a day  doxercalciferol Injectable 1 MICROGram(s) IV Push <User Schedule>  epoetin nelly Injectable 27771 Unit(s) IV Push <User Schedule>  finasteride 5 milliGRAM(s) Oral daily  lidocaine/prilocaine Cream 1 Application(s) Topical daily  pantoprazole    Tablet 40 milliGRAM(s) Oral before breakfast  phytonadione   Solution 5 milliGRAM(s) Oral once  predniSONE   Tablet 5 milliGRAM(s) Oral every other day  sevelamer hydrochloride Oral Tab/Cap - Peds 800 milliGRAM(s) Oral three times a day with meals  silver sulfADIAZINE 1% Cream 1 Application(s) Topical daily  vancomycin    Solution 125 milliGRAM(s) Oral every 6 hours  vancomycin  IVPB 1000 milliGRAM(s) IV Intermittent <User Schedule>      Vital Signs Last 24 Hrs  T(C): 36.6 (2019 12:20), Max: 37.3 (2019 23:10)  T(F): 97.8 (2019 12:20), Max: 99.1 (2019 23:10)  HR: 99 (2019 12:39) (68 - 99)  BP: 138/51 (2019 12:23) (125/46 - 163/49)  BP(mean): --  RR: 17 (2019 12:39) (17 - 18)  SpO2: 96% (2019 06:05) (95% - 98%)      PE:  Constitutional: frail looking  HEENT: NC/AT, EOMI, PERRLA, conjunctivae clear; ears and nose atraumatic; pharynx benign  Neck: supple; thyroid not palpable  Back: no tenderness  Respiratory: respiratory effort normal; clear to auscultation  Cardiovascular: S1S2 regular, no murmurs  Abdomen: soft, not tender, not distended, positive BS; liver and spleen WNL  Genitourinary: no suprapubic tenderness  Lymphatic: no LN palpable  Musculoskeletal: no muscle tenderness, no joint swelling or tenderness  Extremities: R fistula   Neurological/ Psychiatric:  moving all extremities  Skin: L groin hematoma, ecchymosis, erythema, drainage no palpable lesions, LLE wound    Labs: all available labs reviewed                           7.4    x     )-----------( x        ( 2019 09:05 )             22.5     04-    138  |  107  |  58<H>  ----------------------------<  135<H>  4.1   |  22  |  4.85<H>    Ca    7.1<L>      2019 07:46  Phos  2.8     04-21          Urinalysis Basic - ( 2019 04:15 )    Color: Yellow / Appearance: Clear / S.020 / pH: x  Gluc: x / Ketone: Negative  / Bili: Moderate / Urobili: 1 mg/dL   Blood: x / Protein: 100 mg/dL / Nitrite: Negative   Leuk Esterase: Small / RBC: Negative /HPF / WBC 3-5   Sq Epi: x / Non Sq Epi: Negative / Bacteria: Few    Culture - Abscess with Gram Stain (19 @ 08:40)    Specimen Source: .Abscess Leg - Left    Culture Results:   Moderate Enterococcus faecium Susceptibility to follow.  Few Gram Negative Rods Identification and susceptibility to follow.    Culture - Blood (19 @ 10:37)    Specimen Source: .Blood None    Culture Results:   No growth to date.        Culture - Blood (19 @ 10:37)    Specimen Source: .Blood None    Culture Results:   No growth to date.    Radiology: all available radiological tests reviewed    EXAM:  US DPLX UPR EXT VEINS LTD RT                              PROCEDURE DATE:  2019          INTERPRETATION:  CLINICAL INFORMATION: Right arm swelling    COMPARISON: None available.    TECHNIQUE: Duplex sonography of the RIGHT UPPER extremity with color and   spectral Doppler, with and without compression.      FINDINGS:    The right internal jugular, subclavian, axillary, brachial, basilic and   cephalic veins are patent and compressible where applicable.     Doppler examination shows normal spontaneous and phasic flow.    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  Markedly increased velocities within the patient's arteriovenous fistula   raising suspicious for hemodynamically significant stenosis.    < end of copied text >    Advanced directives addressed: full resuscitation

## 2019-04-23 ENCOUNTER — RESULT REVIEW (OUTPATIENT)
Age: 84
End: 2019-04-23

## 2019-04-23 LAB
-  AMIKACIN: SIGNIFICANT CHANGE UP
-  AMPICILLIN/SULBACTAM: SIGNIFICANT CHANGE UP
-  AMPICILLIN: SIGNIFICANT CHANGE UP
-  AMPICILLIN: SIGNIFICANT CHANGE UP
-  AZTREONAM: SIGNIFICANT CHANGE UP
-  CEFAZOLIN: SIGNIFICANT CHANGE UP
-  CEFEPIME: SIGNIFICANT CHANGE UP
-  CEFOXITIN: SIGNIFICANT CHANGE UP
-  CEFTRIAXONE: SIGNIFICANT CHANGE UP
-  CIPROFLOXACIN: SIGNIFICANT CHANGE UP
-  DAPTOMYCIN: SIGNIFICANT CHANGE UP
-  ERTAPENEM: SIGNIFICANT CHANGE UP
-  GENTAMICIN: SIGNIFICANT CHANGE UP
-  LEVOFLOXACIN: SIGNIFICANT CHANGE UP
-  LEVOFLOXACIN: SIGNIFICANT CHANGE UP
-  LINEZOLID: SIGNIFICANT CHANGE UP
-  MEROPENEM: SIGNIFICANT CHANGE UP
-  PIPERACILLIN/TAZOBACTAM: SIGNIFICANT CHANGE UP
-  TETRACYCLINE: SIGNIFICANT CHANGE UP
-  TOBRAMYCIN: SIGNIFICANT CHANGE UP
-  TRIMETHOPRIM/SULFAMETHOXAZOLE: SIGNIFICANT CHANGE UP
-  VANCOMYCIN: SIGNIFICANT CHANGE UP
ALBUMIN SERPL ELPH-MCNC: 1.9 G/DL — LOW (ref 3.3–5)
ANION GAP SERPL CALC-SCNC: 8 MMOL/L — SIGNIFICANT CHANGE UP (ref 5–17)
APTT BLD: 29.9 SEC — SIGNIFICANT CHANGE UP (ref 27.5–36.3)
BUN SERPL-MCNC: 28 MG/DL — HIGH (ref 7–23)
CALCIUM SERPL-MCNC: 7.8 MG/DL — LOW (ref 8.5–10.1)
CHLORIDE SERPL-SCNC: 108 MMOL/L — SIGNIFICANT CHANGE UP (ref 96–108)
CO2 SERPL-SCNC: 24 MMOL/L — SIGNIFICANT CHANGE UP (ref 22–31)
CREAT SERPL-MCNC: 3.05 MG/DL — HIGH (ref 0.5–1.3)
GLUCOSE SERPL-MCNC: 101 MG/DL — HIGH (ref 70–99)
HCT VFR BLD CALC: 28 % — LOW (ref 39–50)
HGB BLD-MCNC: 9 G/DL — LOW (ref 13–17)
INR BLD: 1.16 RATIO — SIGNIFICANT CHANGE UP (ref 0.88–1.16)
INR BLD: 1.22 RATIO — HIGH (ref 0.88–1.16)
MCHC RBC-ENTMCNC: 31.4 PG — SIGNIFICANT CHANGE UP (ref 27–34)
MCHC RBC-ENTMCNC: 32.1 GM/DL — SIGNIFICANT CHANGE UP (ref 32–36)
MCV RBC AUTO: 97.6 FL — SIGNIFICANT CHANGE UP (ref 80–100)
METHOD TYPE: SIGNIFICANT CHANGE UP
METHOD TYPE: SIGNIFICANT CHANGE UP
NRBC # BLD: 2 /100 WBCS — HIGH (ref 0–0)
PLATELET # BLD AUTO: 56 K/UL — LOW (ref 150–400)
POTASSIUM SERPL-MCNC: 4.1 MMOL/L — SIGNIFICANT CHANGE UP (ref 3.5–5.3)
POTASSIUM SERPL-SCNC: 4.1 MMOL/L — SIGNIFICANT CHANGE UP (ref 3.5–5.3)
PROTHROM AB SERPL-ACNC: 12.9 SEC — SIGNIFICANT CHANGE UP (ref 10–12.9)
PROTHROM AB SERPL-ACNC: 13.6 SEC — HIGH (ref 10–12.9)
RBC # BLD: 2.87 M/UL — LOW (ref 4.2–5.8)
RBC # FLD: 20.5 % — HIGH (ref 10.3–14.5)
SODIUM SERPL-SCNC: 140 MMOL/L — SIGNIFICANT CHANGE UP (ref 135–145)
WBC # BLD: 3.43 K/UL — LOW (ref 3.8–10.5)
WBC # FLD AUTO: 3.43 K/UL — LOW (ref 3.8–10.5)

## 2019-04-23 PROCEDURE — 88304 TISSUE EXAM BY PATHOLOGIST: CPT | Mod: 26

## 2019-04-23 RX ORDER — HYDROMORPHONE HYDROCHLORIDE 2 MG/ML
0.5 INJECTION INTRAMUSCULAR; INTRAVENOUS; SUBCUTANEOUS ONCE
Qty: 0 | Refills: 0 | Status: DISCONTINUED | OUTPATIENT
Start: 2019-04-23 | End: 2019-04-23

## 2019-04-23 RX ORDER — CEFEPIME 1 G/1
1000 INJECTION, POWDER, FOR SOLUTION INTRAMUSCULAR; INTRAVENOUS DAILY
Qty: 0 | Refills: 0 | Status: DISCONTINUED | OUTPATIENT
Start: 2019-04-23 | End: 2019-05-02

## 2019-04-23 RX ORDER — SODIUM CHLORIDE 9 MG/ML
3 INJECTION INTRAMUSCULAR; INTRAVENOUS; SUBCUTANEOUS EVERY 8 HOURS
Qty: 0 | Refills: 0 | Status: DISCONTINUED | OUTPATIENT
Start: 2019-04-23 | End: 2019-05-06

## 2019-04-23 RX ORDER — WARFARIN SODIUM 2.5 MG/1
3 TABLET ORAL ONCE
Qty: 0 | Refills: 0 | Status: DISCONTINUED | OUTPATIENT
Start: 2019-04-23 | End: 2019-04-23

## 2019-04-23 RX ORDER — FENTANYL CITRATE 50 UG/ML
50 INJECTION INTRAVENOUS
Qty: 0 | Refills: 0 | Status: DISCONTINUED | OUTPATIENT
Start: 2019-04-23 | End: 2019-04-23

## 2019-04-23 RX ORDER — ONDANSETRON 8 MG/1
4 TABLET, FILM COATED ORAL ONCE
Qty: 0 | Refills: 0 | Status: DISCONTINUED | OUTPATIENT
Start: 2019-04-23 | End: 2019-04-23

## 2019-04-23 RX ORDER — OXYCODONE HYDROCHLORIDE 5 MG/1
5 TABLET ORAL ONCE
Qty: 0 | Refills: 0 | Status: DISCONTINUED | OUTPATIENT
Start: 2019-04-23 | End: 2019-04-23

## 2019-04-23 RX ORDER — MEPERIDINE HYDROCHLORIDE 50 MG/ML
12.5 INJECTION INTRAMUSCULAR; INTRAVENOUS; SUBCUTANEOUS
Qty: 0 | Refills: 0 | Status: DISCONTINUED | OUTPATIENT
Start: 2019-04-23 | End: 2019-04-23

## 2019-04-23 RX ORDER — SODIUM CHLORIDE 9 MG/ML
1000 INJECTION, SOLUTION INTRAVENOUS
Qty: 0 | Refills: 0 | Status: DISCONTINUED | OUTPATIENT
Start: 2019-04-23 | End: 2019-04-23

## 2019-04-23 RX ADMIN — SODIUM CHLORIDE 40 MILLILITER(S): 9 INJECTION, SOLUTION INTRAVENOUS at 18:58

## 2019-04-23 RX ADMIN — CEFEPIME 1000 MILLIGRAM(S): 1 INJECTION, POWDER, FOR SOLUTION INTRAMUSCULAR; INTRAVENOUS at 12:43

## 2019-04-23 RX ADMIN — ATORVASTATIN CALCIUM 20 MILLIGRAM(S): 80 TABLET, FILM COATED ORAL at 21:05

## 2019-04-23 RX ADMIN — FINASTERIDE 5 MILLIGRAM(S): 5 TABLET, FILM COATED ORAL at 12:38

## 2019-04-23 RX ADMIN — OXYCODONE AND ACETAMINOPHEN 2 TABLET(S): 5; 325 TABLET ORAL at 21:05

## 2019-04-23 RX ADMIN — Medication 125 MILLIGRAM(S): at 05:28

## 2019-04-23 RX ADMIN — Medication 125 MILLIGRAM(S): at 12:33

## 2019-04-23 RX ADMIN — OXYCODONE AND ACETAMINOPHEN 2 TABLET(S): 5; 325 TABLET ORAL at 07:25

## 2019-04-23 RX ADMIN — Medication 60 MILLIGRAM(S): at 05:28

## 2019-04-23 RX ADMIN — Medication 60 MILLIGRAM(S): at 20:08

## 2019-04-23 RX ADMIN — Medication 125 MILLIGRAM(S): at 20:08

## 2019-04-23 RX ADMIN — Medication 60 MILLIGRAM(S): at 12:33

## 2019-04-23 RX ADMIN — PANTOPRAZOLE SODIUM 40 MILLIGRAM(S): 20 TABLET, DELAYED RELEASE ORAL at 05:28

## 2019-04-23 RX ADMIN — Medication 50 MILLIGRAM(S): at 05:28

## 2019-04-23 RX ADMIN — Medication 2.5 MILLIGRAM(S): at 12:36

## 2019-04-23 RX ADMIN — Medication 1 APPLICATION(S): at 12:34

## 2019-04-23 RX ADMIN — OXYCODONE AND ACETAMINOPHEN 2 TABLET(S): 5; 325 TABLET ORAL at 06:25

## 2019-04-23 RX ADMIN — OXYCODONE AND ACETAMINOPHEN 2 TABLET(S): 5; 325 TABLET ORAL at 22:05

## 2019-04-23 RX ADMIN — HYDROMORPHONE HYDROCHLORIDE 0.5 MILLIGRAM(S): 2 INJECTION INTRAMUSCULAR; INTRAVENOUS; SUBCUTANEOUS at 22:47

## 2019-04-23 RX ADMIN — HYDROMORPHONE HYDROCHLORIDE 0.5 MILLIGRAM(S): 2 INJECTION INTRAMUSCULAR; INTRAVENOUS; SUBCUTANEOUS at 23:00

## 2019-04-23 RX ADMIN — SODIUM CHLORIDE 3 MILLILITER(S): 9 INJECTION INTRAMUSCULAR; INTRAVENOUS; SUBCUTANEOUS at 21:00

## 2019-04-23 NOTE — PROGRESS NOTE ADULT - PROBLEM SELECTOR PLAN 1
Pt who had an endarterectomy April 1st who developed an infection at entry site and fever of 100.5  -now afebrile  -dopplers of the LLE showed hematoma anterior to the left common femoral artery measuring approximately 3.2 x 0.9 x 5.0 cm  -Dr. Clement recommendation appreciated:Debridement 4/23/19 and possible muscle flap  Pain control w/ percocet as tylenol did not work  -continue aztreonam(pcn allergy) Day #5  -wound care: LLE dressing changes daily with silvadene, non adherent gauze, 4x4, ABD pad, Kerlix and ACE bandage, keep wound clean, apply 4x4s and tape QD and PRN  -wound cx growing E faecium. Vanc resistant  -ID consult appreciated

## 2019-04-23 NOTE — PROGRESS NOTE ADULT - ASSESSMENT
83 y/o male with ESRD on HD (MWF), HTN, RA on MTX and steroids, R leg amputation, severe CDI and megacolon, Severe PAD s/p LLE endarterectomy 2 weeks ago with dr dugan presents with T 100 and DC from L groin.  Pt also c/o R arm pain/swelling where pt had fistulogram of the AC fistula on last admission.  RUL dopplers negative for DVT.  L groin sono--negative for pseudo aneurysm and + hematoma.  Pt given 2.1 L IVF, IV vanco/aztreonam in ED  On my exam in HD suite, awake, chronically ill appearing, NAD, daughter at bedside. Pt seen by Dr dugan in ED.  CXR--clear        4/20  s/p hd yesterday  prolonged bleed from access stopped w pressure  feels well  arm less tender  received 1 u prbc on hd yesterday  monitor cbc    4/22 SY  --ESRD : HD order and tx reviewed.   Tolerating tx well.  --AVF : as above.  Prolonged bleeding post tx.  Venous pressure acceptable today.  Monitor AVF function.  --Anemia : with acute loss.  Active infection leading to LETICIA hyporesponsiveness.  Transfuse 2 units today.  --PAD : post Left Ileofemoral Endarterectomy : Monitor Left foot perfusion.  --ID ; Left Groin infection : Continue abtx.  For debridement in am.    4/23  as above  For HD Wednesday  groin wash out today

## 2019-04-23 NOTE — PROGRESS NOTE ADULT - SUBJECTIVE AND OBJECTIVE BOX
NEPHROLOGY INTERVAL HPI/OVERNIGHT EVENTS:    Date of Service: 04-22-19 @ 09:38    4/23  feels much better today  arm less swollen  for OR later washout groin wound  HD in am    4/22 SY  Alert.  No acute distress.  Complains of soreness in Left foot.  Continued pain in Left Groin area.  Right Arm and Elbow pain improved.    HPI:  85 y/o male with ESRD on HD (MWF), HTN, RA on MTX and steroids, R leg amputation, severe CDI and megacolon, Severe PAD s/p LLE endarterectomy 2 weeks ago with dr dugan presents with T 100 and DC from L groin.  Pt also c/o R arm pain/swelling where pt had fistulogram of the AC fistula on last admission.  RUL dopplers negative for DVT.  L groin sono--negative for pseudo aneurysm and + hematoma.  Pt given 2.1 L IVF, IV vanco/aztreonam in ED  On my exam in HD suite, awake, chronically ill appearing, NAD, daughter at bedside. Pt seen by Dr dugan in ED.  CXR--clear    TTE (10/18)--mild-mod MR/EF 60%    Pt d/w daughter regarding advance directives--Pt is FULL RESUSCITATION (19 Apr 2019 16:12)      MEDICATIONS  (STANDING):  allopurinol 100 milliGRAM(s) Oral <User Schedule>  aspirin  chewable 81 milliGRAM(s) Oral daily  atorvastatin 20 milliGRAM(s) Oral at bedtime  cefepime  Injectable. 1000 milliGRAM(s) IV Push daily  cholestyramine Powder (Sugar-Free) 4 Gram(s) Oral daily  diltiazem    Tablet 60 milliGRAM(s) Oral four times a day  doxercalciferol Injectable 1 MICROGram(s) IV Push <User Schedule>  epoetin nelly Injectable 32378 Unit(s) IV Push <User Schedule>  finasteride 5 milliGRAM(s) Oral daily  lidocaine/prilocaine Cream 1 Application(s) Topical daily  pantoprazole    Tablet 40 milliGRAM(s) Oral before breakfast  predniSONE   Tablet 2.5 milliGRAM(s) Oral every other day  predniSONE   Tablet 5 milliGRAM(s) Oral every other day  sevelamer hydrochloride Oral Tab/Cap - Peds 800 milliGRAM(s) Oral three times a day with meals  silver sulfADIAZINE 1% Cream 1 Application(s) Topical daily  vancomycin    Solution 125 milliGRAM(s) Oral every 6 hours  vancomycin  IVPB 1000 milliGRAM(s) IV Intermittent <User Schedule>    MEDICATIONS  (PRN):  acetaminophen   Tablet .. 650 milliGRAM(s) Oral every 6 hours PRN Temp greater or equal to 38.5C (101.3F)  acetaminophen   Tablet .. 650 milliGRAM(s) Oral every 6 hours PRN Mild Pain (1 - 3)  oxyCODONE    5 mG/acetaminophen 325 mG 2 Tablet(s) Oral every 6 hours PRN Moderate Pain (4 - 6)      Vital Signs Last 24 Hrs  T(C): 37.1 (23 Apr 2019 10:59), Max: 37.1 (23 Apr 2019 05:26)  T(F): 98.7 (23 Apr 2019 10:59), Max: 98.8 (23 Apr 2019 05:26)  HR: 77 (23 Apr 2019 10:59) (73 - 98)  BP: 109/46 (23 Apr 2019 10:59) (109/46 - 148/45)  BP(mean): --  RR: 17 (23 Apr 2019 10:59) (17 - 18)  SpO2: 95% (23 Apr 2019 10:59) (95% - 96%)    PHYSICAL EXAM:  Alert and comfortable  GENERAL: No apparent distress  CHEST/LUNG: Clear to aus  HEART: S1S2 RRR  ABDOMEN: soft  EXTREMITIES: no edema  SKIN:     LABS:             04-23    140  |  108  |  28<H>  ----------------------------<  101<H>  4.1   |  24  |  3.05<H>    Ca    7.8<L>      23 Apr 2019 07:02    TPro  x   /  Alb  1.9<L>  /  TBili  x   /  DBili  x   /  AST  x   /  ALT  x   /  AlkPhos  x   04-23                          9.0    3.43  )-----------( 56       ( 23 Apr 2019 07:02 )             28.0

## 2019-04-23 NOTE — CHART NOTE - NSCHARTNOTEFT_GEN_A_CORE
Post op check  Went to see the patient at bedside. Pt is hemodynamically stable with 2 drains placed at surgical site. Pt is comfortable and not in any pain. Spoke w/ Dr. Clement who recommends holding coumadin today and restart the pt tomorrow.

## 2019-04-23 NOTE — PROGRESS NOTE ADULT - SUBJECTIVE AND OBJECTIVE BOX
INR 1.22, lytes acceptable    dialysis proceeded yesterday without much difficulty    for debridement L groin today, possible muscle flap coverage    MEDICATIONS  (STANDING):  allopurinol 100 milliGRAM(s) Oral <User Schedule>  aspirin  chewable 81 milliGRAM(s) Oral daily  atorvastatin 20 milliGRAM(s) Oral at bedtime  aztreonam  IVPB 500 milliGRAM(s) IV Intermittent every 12 hours  cholestyramine Powder (Sugar-Free) 4 Gram(s) Oral daily  collagenase Ointment 1 Application(s) Topical two times a day  diltiazem    Tablet 60 milliGRAM(s) Oral four times a day  doxercalciferol Injectable 1 MICROGram(s) IV Push <User Schedule>  epoetin nelly Injectable 86900 Unit(s) IV Push <User Schedule>  finasteride 5 milliGRAM(s) Oral daily  lidocaine/prilocaine Cream 1 Application(s) Topical daily  pantoprazole    Tablet 40 milliGRAM(s) Oral before breakfast  predniSONE   Tablet 2.5 milliGRAM(s) Oral every other day  predniSONE   Tablet 5 milliGRAM(s) Oral every other day  sevelamer hydrochloride Oral Tab/Cap - Peds 800 milliGRAM(s) Oral three times a day with meals  silver sulfADIAZINE 1% Cream 1 Application(s) Topical daily  vancomycin    Solution 125 milliGRAM(s) Oral every 6 hours  vancomycin  IVPB 1000 milliGRAM(s) IV Intermittent <User Schedule>    MEDICATIONS  (PRN):  acetaminophen   Tablet .. 650 milliGRAM(s) Oral every 6 hours PRN Temp greater or equal to 38.5C (101.3F)  acetaminophen   Tablet .. 650 milliGRAM(s) Oral every 6 hours PRN Mild Pain (1 - 3)  oxyCODONE    5 mG/acetaminophen 325 mG 2 Tablet(s) Oral every 6 hours PRN Moderate Pain (4 - 6)      Allergies    Zosyn (Rash)    Intolerances        Flatus: [ ] YES [ ] NO             Bowel Movement: [ ] YES [ ] NO  Pain (0-10):            Pain Control Adequate: [ ] YES [ ] NO  Nausea: [ ] YES [ ] NO            Vomiting: [ ] YES [ ] NO  Diarrhea: [ ] YES [ ] NO         Constipation: [ ] YES [ ] NO     Chest Pain: [ ] YES [ ] NO    SOB:  [ ] YES [ ] NO    Vital Signs Last 24 Hrs  T(C): 37.1 (23 Apr 2019 05:26), Max: 37.1 (22 Apr 2019 08:55)  T(F): 98.8 (23 Apr 2019 05:26), Max: 98.8 (22 Apr 2019 08:55)  HR: 73 (23 Apr 2019 05:26) (68 - 99)  BP: 123/41 (23 Apr 2019 05:26) (123/41 - 163/49)  BP(mean): --  RR: 17 (23 Apr 2019 05:26) (17 - 18)  SpO2: 96% (23 Apr 2019 05:26) (96% - 96%)    I&O's Summary    22 Apr 2019 07:01  -  23 Apr 2019 07:00  --------------------------------------------------------  IN: 892 mL / OUT: 0 mL / NET: 892 mL        Physical Exam:  General: NAD, resting comfortably  Pulmonary: normal resp effort, CTA-B  Cardiovascular: NSR  Abdominal: soft, NT/ND  Extremities: WWP, normal strength  Neuro: A/O x 3, CNs II-XII grossly intact, normal motor/sensation, no focal deficits  Pulses:   Right:                                                                          Left:  FEM [ ]2+ [ ]1+ [ ]doppler                                             FEM [ ]2+ [ ]1+ [ ]doppler    POP [ ]2+ [ ]1+ [ ]doppler                                             POP [ ]2+ [ ]1+ [ ]doppler    DP [ ]2+ [ ]1+ [ ]doppler                                                DP [ ]2+ [ ]1+ [ ]doppler  PT[ ]2+ [ ]1+ [ ]doppler                                                  PT [ ]2+ [ ]1+ [ ]doppler    LABS:                        9.0    3.43  )-----------( 56       ( 23 Apr 2019 07:02 )             28.0     04-23    140  |  108  |  28<H>  ----------------------------<  101<H>  4.1   |  24  |  3.05<H>    Ca    7.8<L>      23 Apr 2019 07:02    TPro  x   /  Alb  1.9<L>  /  TBili  x   /  DBili  x   /  AST  x   /  ALT  x   /  AlkPhos  x   04-23    PT/INR - ( 23 Apr 2019 07:02 )   PT: 13.6 sec;   INR: 1.22 ratio         PTT - ( 23 Apr 2019 07:02 )  PTT:29.9 sec    LIVER FUNCTIONS - ( 23 Apr 2019 07:02 )  Alb: 1.9 g/dL / Pro: x     / ALK PHOS: x     / ALT: x     / AST: x     / GGT: x           CAPILLARY BLOOD GLUCOSE          RADIOLOGY & ADDITIONAL TESTS:

## 2019-04-23 NOTE — PROGRESS NOTE ADULT - SUBJECTIVE AND OBJECTIVE BOX
CHIEF COMPLAINT:    SUBJECTIVE:   85 y/o male with ESRD on HD (MWF), HTN, RA on MTX and steroids, R leg amputation, severe CDI and megacolon, Severe PAD s/p LLE endarterectomy (4/1/19)with dr Clement presents with T 100.5 and DC from L groin.  Pt also c/o R arm pain/swelling where pt had fistulogram of the AC fistula on last admission.    4/23 pt seen and examined at bedside. Pt states he is still having some left leg pain. Pt has no other complaints at this time and is going for debridement at 2:30pm. Nose bleed overnight. Given oxymetazoline and silver nitrate used for cauterization.    REVIEW OF SYSTEMS:  CONSTITUTIONAL: No weakness, fevers or chills. + pain of left leg  EYES/ENT: No visual changes;  No vertigo or throat pain   NECK: No pain or stiffness  RESPIRATORY: No cough, wheezing, hemoptysis; No shortness of breath  CARDIOVASCULAR: No chest pain or palpitations  GASTROINTESTINAL: No abdominal or epigastric pain. No nausea, vomiting, or hematemesis; No diarrhea or constipation. No melena or hematochezia.  GENITOURINARY: No dysuria, frequency or hematuria  NEUROLOGICAL: No numbness or weakness  SKIN: No itching, burning, rashes, or lesions   All other review of systems is negative unless indicated above    Vital Signs Last 24 Hrs  T(C): 37.1 (23 Apr 2019 10:59), Max: 37.1 (23 Apr 2019 05:26)  T(F): 98.7 (23 Apr 2019 10:59), Max: 98.8 (23 Apr 2019 05:26)  HR: 77 (23 Apr 2019 10:59) (73 - 98)  BP: 109/46 (23 Apr 2019 10:59) (109/46 - 148/45)  BP(mean): --  RR: 17 (23 Apr 2019 10:59) (17 - 18)  SpO2: 95% (23 Apr 2019 10:59) (95% - 96%)    I&O's Summary    22 Apr 2019 07:01  -  23 Apr 2019 07:00  --------------------------------------------------------  IN: 892 mL / OUT: 0 mL / NET: 892 mL        CAPILLARY BLOOD GLUCOSE          PHYSICAL EXAM:  Constitutional: NAD, awake and alert, well-developed  HEENT: PERR, EOMI, Normal Hearing, MMM  Neck: Soft and supple, No LAD, No JVD  Respiratory: LLL mild crackles, No wheezing, rales or rhonchi  Cardiovascular: S1 and S2, regular rate and rhythm, no Murmurs, gallops or rubs  Gastrointestinal: Bowel Sounds present, soft, nontender, nondistended, no guarding, no rebound  Extremities: No peripheral edema. R BKA. Would site has bandage stained brown from drainage  Vascular: 2+ peripheral pulses  Neurological: A/O x 3, no focal deficits  Musculoskeletal: 5/5 strength b/l upper and lower extremities  Skin: No rashes    MEDICATIONS:  MEDICATIONS  (STANDING):  allopurinol 100 milliGRAM(s) Oral <User Schedule>  aspirin  chewable 81 milliGRAM(s) Oral daily  atorvastatin 20 milliGRAM(s) Oral at bedtime  cefepime  Injectable. 1000 milliGRAM(s) IV Push daily  cholestyramine Powder (Sugar-Free) 4 Gram(s) Oral daily  diltiazem    Tablet 60 milliGRAM(s) Oral four times a day  doxercalciferol Injectable 1 MICROGram(s) IV Push <User Schedule>  epoetin nelly Injectable 98478 Unit(s) IV Push <User Schedule>  finasteride 5 milliGRAM(s) Oral daily  lidocaine/prilocaine Cream 1 Application(s) Topical daily  pantoprazole    Tablet 40 milliGRAM(s) Oral before breakfast  predniSONE   Tablet 2.5 milliGRAM(s) Oral every other day  predniSONE   Tablet 5 milliGRAM(s) Oral every other day  sevelamer hydrochloride Oral Tab/Cap - Peds 800 milliGRAM(s) Oral three times a day with meals  silver sulfADIAZINE 1% Cream 1 Application(s) Topical daily  vancomycin    Solution 125 milliGRAM(s) Oral every 6 hours  vancomycin  IVPB 1000 milliGRAM(s) IV Intermittent <User Schedule>      LABS: All Labs Reviewed:                        9.0    3.43  )-----------( 56       ( 23 Apr 2019 07:02 )             28.0     04-23    140  |  108  |  28<H>  ----------------------------<  101<H>  4.1   |  24  |  3.05<H>    Ca    7.8<L>      23 Apr 2019 07:02    TPro  x   /  Alb  1.9<L>  /  TBili  x   /  DBili  x   /  AST  x   /  ALT  x   /  AlkPhos  x   04-23    PT/INR - ( 23 Apr 2019 10:04 )   PT: 12.9 sec;   INR: 1.16 ratio         PTT - ( 23 Apr 2019 07:02 )  PTT:29.9 sec      Blood Culture: 04-21 @ 08:40  Organism Enterococcus faecium (vancomycin resistant)  Gram Stain Blood -- Gram Stain --  Specimen Source .Abscess Leg - Left  Culture-Blood --    04-19 @ 10:37  Organism --  Gram Stain Blood -- Gram Stain --  Specimen Source .Blood None  Culture-Blood --    04-19 @ 04:15  Organism --  Gram Stain Blood -- Gram Stain --  Specimen Source .Urine Clean Catch (Midstream)  Culture-Blood --        RADIOLOGY/EKG:  < from: US Duplex Arterial Lower Ext Ltd, Left (04.19.19 @ 12:13) >  Impression:    Focal ultrasound of the left groin demonstrates no evidence of   pseudoaneurysm.    Hematoma anterior to the left common femoral artery measuring   approximately 3.2 x 0.9 x 5.0 cm.    There is plaque within the proximal left femoral artery, with an elevated   velocity of 110 cm/s.        < from: US Duplex Venous Upper Ext Ltd, Right (04.19.19 @ 12:16) >  IMPRESSION:     No evidence of right upper extremity deep venous thrombosis.  Markedly increased velocities within the patient's arteriovenous fistula   raising suspicious for hemodynamically significant stenosis.  DVT PPX:  coumadin held for procedure  ADVANCED DIRECTIVE:    DISPOSITION: CHIEF COMPLAINT:    SUBJECTIVE:   83 y/o male with ESRD on HD (MWF), HTN, RA on MTX and steroids, R leg amputation, severe CDI and megacolon, Severe PAD s/p LLE endarterectomy (4/1/19)with dr Clement presents with T 100.5 and DC from L groin.  Pt also c/o R arm pain/swelling where pt had fistulogram of the AC fistula on last admission.    4/23 pt seen and examined at bedside. Pt states he is still having some left leg pain. Pt has no other complaints at this time and is going for debridement at 2:30pm. Nose bleed overnight. Given oxymetazoline and silver nitrate used for cauterization. Patient is medically cleared for procedure for today.    REVIEW OF SYSTEMS:  CONSTITUTIONAL: No weakness, fevers or chills. + pain of left leg  EYES/ENT: No visual changes;  No vertigo or throat pain   NECK: No pain or stiffness  RESPIRATORY: No cough, wheezing, hemoptysis; No shortness of breath  CARDIOVASCULAR: No chest pain or palpitations  GASTROINTESTINAL: No abdominal or epigastric pain. No nausea, vomiting, or hematemesis; No diarrhea or constipation. No melena or hematochezia.  GENITOURINARY: No dysuria, frequency or hematuria  NEUROLOGICAL: No numbness or weakness  SKIN: No itching, burning, rashes, or lesions   All other review of systems is negative unless indicated above    Vital Signs Last 24 Hrs  T(C): 37.1 (23 Apr 2019 10:59), Max: 37.1 (23 Apr 2019 05:26)  T(F): 98.7 (23 Apr 2019 10:59), Max: 98.8 (23 Apr 2019 05:26)  HR: 77 (23 Apr 2019 10:59) (73 - 98)  BP: 109/46 (23 Apr 2019 10:59) (109/46 - 148/45)  BP(mean): --  RR: 17 (23 Apr 2019 10:59) (17 - 18)  SpO2: 95% (23 Apr 2019 10:59) (95% - 96%)    I&O's Summary    22 Apr 2019 07:01  -  23 Apr 2019 07:00  --------------------------------------------------------  IN: 892 mL / OUT: 0 mL / NET: 892 mL        CAPILLARY BLOOD GLUCOSE          PHYSICAL EXAM:  Constitutional: NAD, awake and alert, well-developed  HEENT: PERR, EOMI, Normal Hearing, MMM  Neck: Soft and supple, No LAD, No JVD  Respiratory: LLL mild crackles, No wheezing, rales or rhonchi  Cardiovascular: S1 and S2, regular rate and rhythm, no Murmurs, gallops or rubs  Gastrointestinal: Bowel Sounds present, soft, nontender, nondistended, no guarding, no rebound  Extremities: No peripheral edema. R BKA. Would site has bandage stained brown from drainage  Vascular: 2+ peripheral pulses  Neurological: A/O x 3, no focal deficits  Musculoskeletal: 5/5 strength b/l upper and lower extremities  Skin: No rashes    MEDICATIONS:  MEDICATIONS  (STANDING):  allopurinol 100 milliGRAM(s) Oral <User Schedule>  aspirin  chewable 81 milliGRAM(s) Oral daily  atorvastatin 20 milliGRAM(s) Oral at bedtime  cefepime  Injectable. 1000 milliGRAM(s) IV Push daily  cholestyramine Powder (Sugar-Free) 4 Gram(s) Oral daily  diltiazem    Tablet 60 milliGRAM(s) Oral four times a day  doxercalciferol Injectable 1 MICROGram(s) IV Push <User Schedule>  epoetin nelly Injectable 86084 Unit(s) IV Push <User Schedule>  finasteride 5 milliGRAM(s) Oral daily  lidocaine/prilocaine Cream 1 Application(s) Topical daily  pantoprazole    Tablet 40 milliGRAM(s) Oral before breakfast  predniSONE   Tablet 2.5 milliGRAM(s) Oral every other day  predniSONE   Tablet 5 milliGRAM(s) Oral every other day  sevelamer hydrochloride Oral Tab/Cap - Peds 800 milliGRAM(s) Oral three times a day with meals  silver sulfADIAZINE 1% Cream 1 Application(s) Topical daily  vancomycin    Solution 125 milliGRAM(s) Oral every 6 hours  vancomycin  IVPB 1000 milliGRAM(s) IV Intermittent <User Schedule>      LABS: All Labs Reviewed:                        9.0    3.43  )-----------( 56       ( 23 Apr 2019 07:02 )             28.0     04-23    140  |  108  |  28<H>  ----------------------------<  101<H>  4.1   |  24  |  3.05<H>    Ca    7.8<L>      23 Apr 2019 07:02    TPro  x   /  Alb  1.9<L>  /  TBili  x   /  DBili  x   /  AST  x   /  ALT  x   /  AlkPhos  x   04-23    PT/INR - ( 23 Apr 2019 10:04 )   PT: 12.9 sec;   INR: 1.16 ratio         PTT - ( 23 Apr 2019 07:02 )  PTT:29.9 sec      Blood Culture: 04-21 @ 08:40  Organism Enterococcus faecium (vancomycin resistant)  Gram Stain Blood -- Gram Stain --  Specimen Source .Abscess Leg - Left  Culture-Blood --    04-19 @ 10:37  Organism --  Gram Stain Blood -- Gram Stain --  Specimen Source .Blood None  Culture-Blood --    04-19 @ 04:15  Organism --  Gram Stain Blood -- Gram Stain --  Specimen Source .Urine Clean Catch (Midstream)  Culture-Blood --        RADIOLOGY/EKG:  < from: US Duplex Arterial Lower Ext Ltd, Left (04.19.19 @ 12:13) >  Impression:    Focal ultrasound of the left groin demonstrates no evidence of   pseudoaneurysm.    Hematoma anterior to the left common femoral artery measuring   approximately 3.2 x 0.9 x 5.0 cm.    There is plaque within the proximal left femoral artery, with an elevated   velocity of 110 cm/s.        < from: US Duplex Venous Upper Ext Ltd, Right (04.19.19 @ 12:16) >  IMPRESSION:     No evidence of right upper extremity deep venous thrombosis.  Markedly increased velocities within the patient's arteriovenous fistula   raising suspicious for hemodynamically significant stenosis.  DVT PPX:  coumadin held for procedure  ADVANCED DIRECTIVE:    DISPOSITION:

## 2019-04-24 LAB
ADD ON TEST-SPECIMEN IN LAB: SIGNIFICANT CHANGE UP
ALBUMIN SERPL ELPH-MCNC: 2 G/DL — LOW (ref 3.3–5)
ANION GAP SERPL CALC-SCNC: 8 MMOL/L — SIGNIFICANT CHANGE UP (ref 5–17)
ANISOCYTOSIS BLD QL: SLIGHT — SIGNIFICANT CHANGE UP
BASO STIPL BLD QL SMEAR: PRESENT — SIGNIFICANT CHANGE UP
BASOPHILS # BLD AUTO: 0 K/UL — SIGNIFICANT CHANGE UP (ref 0–0.2)
BASOPHILS NFR BLD AUTO: 0 % — SIGNIFICANT CHANGE UP (ref 0–2)
BUN SERPL-MCNC: 40 MG/DL — HIGH (ref 7–23)
CALCIUM SERPL-MCNC: 7.9 MG/DL — LOW (ref 8.5–10.1)
CHLORIDE SERPL-SCNC: 106 MMOL/L — SIGNIFICANT CHANGE UP (ref 96–108)
CO2 SERPL-SCNC: 23 MMOL/L — SIGNIFICANT CHANGE UP (ref 22–31)
CREAT SERPL-MCNC: 4.16 MG/DL — HIGH (ref 0.5–1.3)
CULTURE RESULTS: SIGNIFICANT CHANGE UP
CULTURE RESULTS: SIGNIFICANT CHANGE UP
EOSINOPHIL # BLD AUTO: 0.16 K/UL — SIGNIFICANT CHANGE UP (ref 0–0.5)
EOSINOPHIL NFR BLD AUTO: 4 % — SIGNIFICANT CHANGE UP (ref 0–6)
GLUCOSE SERPL-MCNC: 90 MG/DL — SIGNIFICANT CHANGE UP (ref 70–99)
HCT VFR BLD CALC: 31.2 % — LOW (ref 39–50)
HGB BLD-MCNC: 10 G/DL — LOW (ref 13–17)
INR BLD: 1.05 RATIO — SIGNIFICANT CHANGE UP (ref 0.88–1.16)
LYMPHOCYTES # BLD AUTO: 0.72 K/UL — LOW (ref 1–3.3)
LYMPHOCYTES # BLD AUTO: 18 % — SIGNIFICANT CHANGE UP (ref 13–44)
MANUAL SMEAR VERIFICATION: SIGNIFICANT CHANGE UP
MCHC RBC-ENTMCNC: 30.9 PG — SIGNIFICANT CHANGE UP (ref 27–34)
MCHC RBC-ENTMCNC: 32.1 GM/DL — SIGNIFICANT CHANGE UP (ref 32–36)
MCV RBC AUTO: 96.3 FL — SIGNIFICANT CHANGE UP (ref 80–100)
MONOCYTES # BLD AUTO: 0.76 K/UL — SIGNIFICANT CHANGE UP (ref 0–0.9)
MONOCYTES NFR BLD AUTO: 19 % — HIGH (ref 2–14)
NEUTROPHILS # BLD AUTO: 2.36 K/UL — SIGNIFICANT CHANGE UP (ref 1.8–7.4)
NEUTROPHILS NFR BLD AUTO: 59 % — SIGNIFICANT CHANGE UP (ref 43–77)
NRBC # BLD: 0 /100 — SIGNIFICANT CHANGE UP (ref 0–0)
NRBC # BLD: SIGNIFICANT CHANGE UP /100 WBCS (ref 0–0)
PHOSPHATE SERPL-MCNC: 3.7 MG/DL — SIGNIFICANT CHANGE UP (ref 2.5–4.5)
PLAT MORPH BLD: NORMAL — SIGNIFICANT CHANGE UP
PLATELET # BLD AUTO: 102 K/UL — LOW (ref 150–400)
POIKILOCYTOSIS BLD QL AUTO: SLIGHT — SIGNIFICANT CHANGE UP
POLYCHROMASIA BLD QL SMEAR: SLIGHT — SIGNIFICANT CHANGE UP
POTASSIUM SERPL-MCNC: 4.7 MMOL/L — SIGNIFICANT CHANGE UP (ref 3.5–5.3)
POTASSIUM SERPL-SCNC: 4.7 MMOL/L — SIGNIFICANT CHANGE UP (ref 3.5–5.3)
PROTHROM AB SERPL-ACNC: 11.7 SEC — SIGNIFICANT CHANGE UP (ref 10–12.9)
RBC # BLD: 3.24 M/UL — LOW (ref 4.2–5.8)
RBC # FLD: 21.2 % — HIGH (ref 10.3–14.5)
RBC BLD AUTO: ABNORMAL
SODIUM SERPL-SCNC: 137 MMOL/L — SIGNIFICANT CHANGE UP (ref 135–145)
SPECIMEN SOURCE: SIGNIFICANT CHANGE UP
SPECIMEN SOURCE: SIGNIFICANT CHANGE UP
WBC # BLD: 4 K/UL — SIGNIFICANT CHANGE UP (ref 3.8–10.5)
WBC # FLD AUTO: 4 K/UL — SIGNIFICANT CHANGE UP (ref 3.8–10.5)

## 2019-04-24 PROCEDURE — 99024 POSTOP FOLLOW-UP VISIT: CPT

## 2019-04-24 RX ORDER — WARFARIN SODIUM 2.5 MG/1
5 TABLET ORAL ONCE
Qty: 0 | Refills: 0 | Status: COMPLETED | OUTPATIENT
Start: 2019-04-24 | End: 2019-04-24

## 2019-04-24 RX ORDER — HYDROMORPHONE HYDROCHLORIDE 2 MG/ML
0.5 INJECTION INTRAMUSCULAR; INTRAVENOUS; SUBCUTANEOUS EVERY 4 HOURS
Qty: 0 | Refills: 0 | Status: DISCONTINUED | OUTPATIENT
Start: 2019-04-24 | End: 2019-05-01

## 2019-04-24 RX ADMIN — Medication 250 MILLIGRAM(S): at 17:01

## 2019-04-24 RX ADMIN — Medication 5 MILLIGRAM(S): at 12:31

## 2019-04-24 RX ADMIN — Medication 125 MILLIGRAM(S): at 12:31

## 2019-04-24 RX ADMIN — Medication 81 MILLIGRAM(S): at 12:24

## 2019-04-24 RX ADMIN — Medication 1 APPLICATION(S): at 12:24

## 2019-04-24 RX ADMIN — DOXERCALCIFEROL 1 MICROGRAM(S): 2.5 CAPSULE ORAL at 10:42

## 2019-04-24 RX ADMIN — Medication 125 MILLIGRAM(S): at 22:37

## 2019-04-24 RX ADMIN — OXYCODONE AND ACETAMINOPHEN 2 TABLET(S): 5; 325 TABLET ORAL at 04:30

## 2019-04-24 RX ADMIN — OXYCODONE AND ACETAMINOPHEN 2 TABLET(S): 5; 325 TABLET ORAL at 12:10

## 2019-04-24 RX ADMIN — OXYCODONE AND ACETAMINOPHEN 2 TABLET(S): 5; 325 TABLET ORAL at 03:49

## 2019-04-24 RX ADMIN — SODIUM CHLORIDE 3 MILLILITER(S): 9 INJECTION INTRAMUSCULAR; INTRAVENOUS; SUBCUTANEOUS at 05:15

## 2019-04-24 RX ADMIN — SODIUM CHLORIDE 3 MILLILITER(S): 9 INJECTION INTRAMUSCULAR; INTRAVENOUS; SUBCUTANEOUS at 22:31

## 2019-04-24 RX ADMIN — Medication 125 MILLIGRAM(S): at 17:02

## 2019-04-24 RX ADMIN — WARFARIN SODIUM 5 MILLIGRAM(S): 2.5 TABLET ORAL at 22:36

## 2019-04-24 RX ADMIN — Medication 60 MILLIGRAM(S): at 05:18

## 2019-04-24 RX ADMIN — ATORVASTATIN CALCIUM 20 MILLIGRAM(S): 80 TABLET, FILM COATED ORAL at 22:37

## 2019-04-24 RX ADMIN — Medication 60 MILLIGRAM(S): at 17:02

## 2019-04-24 RX ADMIN — Medication 60 MILLIGRAM(S): at 00:35

## 2019-04-24 RX ADMIN — SEVELAMER CARBONATE 800 MILLIGRAM(S): 2400 POWDER, FOR SUSPENSION ORAL at 08:07

## 2019-04-24 RX ADMIN — Medication 125 MILLIGRAM(S): at 05:18

## 2019-04-24 RX ADMIN — SEVELAMER CARBONATE 800 MILLIGRAM(S): 2400 POWDER, FOR SUSPENSION ORAL at 12:31

## 2019-04-24 RX ADMIN — CHOLESTYRAMINE 4 GRAM(S): 4 POWDER, FOR SUSPENSION ORAL at 08:07

## 2019-04-24 RX ADMIN — ERYTHROPOIETIN 10000 UNIT(S): 10000 INJECTION, SOLUTION INTRAVENOUS; SUBCUTANEOUS at 10:42

## 2019-04-24 RX ADMIN — CEFEPIME 1000 MILLIGRAM(S): 1 INJECTION, POWDER, FOR SOLUTION INTRAMUSCULAR; INTRAVENOUS at 12:32

## 2019-04-24 RX ADMIN — OXYCODONE AND ACETAMINOPHEN 2 TABLET(S): 5; 325 TABLET ORAL at 11:08

## 2019-04-24 RX ADMIN — Medication 100 MILLIGRAM(S): at 11:09

## 2019-04-24 RX ADMIN — SEVELAMER CARBONATE 800 MILLIGRAM(S): 2400 POWDER, FOR SUSPENSION ORAL at 17:02

## 2019-04-24 RX ADMIN — FINASTERIDE 5 MILLIGRAM(S): 5 TABLET, FILM COATED ORAL at 12:31

## 2019-04-24 RX ADMIN — Medication 125 MILLIGRAM(S): at 00:36

## 2019-04-24 RX ADMIN — Medication 60 MILLIGRAM(S): at 22:38

## 2019-04-24 RX ADMIN — SODIUM CHLORIDE 3 MILLILITER(S): 9 INJECTION INTRAMUSCULAR; INTRAVENOUS; SUBCUTANEOUS at 13:01

## 2019-04-24 RX ADMIN — LIDOCAINE AND PRILOCAINE CREAM 1 APPLICATION(S): 25; 25 CREAM TOPICAL at 07:05

## 2019-04-24 RX ADMIN — Medication 60 MILLIGRAM(S): at 12:30

## 2019-04-24 RX ADMIN — PANTOPRAZOLE SODIUM 40 MILLIGRAM(S): 20 TABLET, DELAYED RELEASE ORAL at 05:18

## 2019-04-24 NOTE — PROGRESS NOTE ADULT - PROBLEM SELECTOR PLAN 2
- s/p R arm fistula revision with fistulogram on March 28, 2019 by Dr. Urbano CURRIE venous doppler - no evidence of DVT; markedly increased velocity in AV fistula of arm raising suspicion of hemodynamically significant stenosis  - Vascular surgery recs appreciated.

## 2019-04-24 NOTE — PROGRESS NOTE ADULT - ASSESSMENT
85 y/o male with ESRD on HD (MWF), HTN, RA on MTX and steroids, R leg amputation, severe CDI and megacolon, Severe PAD s/p LLE endarterectomy 2 weeks ago with dr dugan presents with T 100 and DC from L groin.  Pt also c/o R arm pain/swelling where pt had fistulogram of the AC fistula on last admission.  RUL dopplers negative for DVT.  L groin sono--negative for pseudo aneurysm and + hematoma, eval [facility] vascular surgery noted to have seropurulent drainage from L groin along medial flap and erythema along wound edges concerning for superimposed infection.  Pt given 2.1 L IVF, IV vanco/aztreonam in ED.     1. L groin wound infection/hematoma. s/p LLE endarterectomy. ESRD. 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 other issues - care per medicine

## 2019-04-24 NOTE — PROGRESS NOTE ADULT - SUBJECTIVE AND OBJECTIVE BOX
Date of service: 19 @ 11:51    pt seen and examined  c/o L groin pain  s/p debridement of L groin wound   afebrile in SDU    ROS: no fever or chills; denies dizziness, no HA, no SOB or cough, no abdominal pain, no diarrhea or constipation; no dysuria, no urinary frequency, no legs pain, no rashes      MEDICATIONS  (STANDING)  allopurinol 100 milliGRAM(s) Oral <User Schedule>  aspirin  chewable 81 milliGRAM(s) Oral daily  atorvastatin 20 milliGRAM(s) Oral at bedtime  cefepime  Injectable. 1000 milliGRAM(s) IV Push daily  cholestyramine Powder (Sugar-Free) 4 Gram(s) Oral daily  diltiazem    Tablet 60 milliGRAM(s) Oral four times a day  doxercalciferol Injectable 1 MICROGram(s) IV Push <User Schedule>  epoetin nelly Injectable 87929 Unit(s) IV Push <User Schedule>  finasteride 5 milliGRAM(s) Oral daily  lidocaine/prilocaine Cream 1 Application(s) Topical daily  pantoprazole    Tablet 40 milliGRAM(s) Oral before breakfast  predniSONE   Tablet 2.5 milliGRAM(s) Oral every other day  predniSONE   Tablet 5 milliGRAM(s) Oral every other day  sevelamer hydrochloride Oral Tab/Cap - Peds 800 milliGRAM(s) Oral three times a day with meals  silver sulfADIAZINE 1% Cream 1 Application(s) Topical daily  sodium chloride 0.9% lock flush 3 milliLiter(s) IV Push every 8 hours  vancomycin    Solution 125 milliGRAM(s) Oral every 6 hours  vancomycin  IVPB 1000 milliGRAM(s) IV Intermittent <User Schedule>      Vital Signs Last 24 Hrs  T(C): 37 (2019 08:30), Max: 37 (2019 08:30)  T(F): 98.6 (2019 08:30), Max: 98.6 (2019 08:30)  HR: 109 (2019 11:28) (77 - 110)  BP: 139/54 (2019 11:28) (125/46 - 157/42)  BP(mean): 72 (2019 06:00) (62 - 72)  RR: 19 (2019 11:28) (13 - 20)  SpO2: 99% (2019 10:24) (93% - 100%)      PE:  Constitutional: frail looking  HEENT: NC/AT, EOMI, PERRLA, conjunctivae clear; ears and nose atraumatic; pharynx benign  Neck: supple; thyroid not palpable  Back: no tenderness  Respiratory: respiratory effort normal; clear to auscultation  Cardiovascular: S1S2 regular, no murmurs  Abdomen: soft, not tender, not distended, positive BS; liver and spleen WNL  Genitourinary: no suprapubic tenderness  Lymphatic: no LN palpable  Musculoskeletal: no muscle tenderness, no joint swelling or tenderness  Extremities: R fistula   Neurological/ Psychiatric:  moving all extremities  Skin: L groin hematoma, ecchymosis, erythema, drainage no palpable lesions, LLE wound    Labs: all available labs reviewed                                      10.0   .00  )-----------( 102      ( 2019 06:48 )             31.2     -    137  |  106  |  40<H>  ----------------------------<  90  4.7   |  23  |  4.16<H>    Ca    7.9<L>      2019 06:48  Phos  3.7         TPro  x   /  Alb  2.0<L>  /  TBili  x   /  DBili  x   /  AST  x   /  ALT  x   /  AlkPhos  x              Cultures:     Culture - Abscess with Gram Stain (19 @ 08:40)    -  Amikacin: S <=16    -  Levofloxacin: R >4    -  Levofloxacin: S <=2    -  Ciprofloxacin: S <=1    -  Daptomycin: S 2    -  Ceftriaxone: S <=1 Enterobacter, Citrobacter, and Serratia may develop resistance during prolonged therapy    -  Cefoxitin: S <=8    -  Cefepime: S <=4    -  Cefazolin: S <=8    -  Aztreonam: S <=4    -  Piperacillin/Tazobactam: S <=16    -  Tetra/Doxy: R >8    -  Linezolid: S 1    -  Ertapenem: S <=1    -  Gentamicin: S <=4    -  Tobramycin: S <=4    -  Meropenem: S <=1    -  Trimethoprim/Sulfamethoxazole: S <=2/38    -  Vancomycin: R >16    -  Ampicillin: R >8 Predicts results to ampicillin/sulbactam, amoxacillin-clavulanate and  piperacillin-tazobactam.    -  Ampicillin: S <=8 These ampicillin results predict results for amoxicillin    -  Ampicillin/Sulbactam: S <=8/4    Specimen Source: .Abscess Leg - Left    Culture Results:   Moderate Enterococcus faecium (vancomycin resistant)  Few Proteus mirabilis    Organism Identification: Enterococcus faecium (vancomycin resistant)  Proteus mirabilis    Organism: Enterococcus faecium (vancomycin resistant)    Organism: Proteus mirabilis    Method Type: DAVIDSON    Method Type: DAVIDSON        Urinalysis Basic - ( 2019 04:15 )    Color: Yellow / Appearance: Clear / S.020 / pH: x  Gluc: x / Ketone: Negative  / Bili: Moderate / Urobili: 1 mg/dL   Blood: x / Protein: 100 mg/dL / Nitrite: Negative   Leuk Esterase: Small / RBC: Negative /HPF / WBC 3-5   Sq Epi: x / Non Sq Epi: Negative / Bacteria: Few    Culture - Abscess with Gram Stain (19 @ 08:40)    Specimen Source: .Abscess Leg - Left    Culture Results:   Moderate Enterococcus faecium Susceptibility to follow.  Few Gram Negative Rods Identification and susceptibility to follow.    Culture - Blood (19 @ 10:37)    Specimen Source: .Blood None    Culture Results:   No growth to date.        Culture - Blood (19 @ 10:37)    Specimen Source: .Blood None    Culture Results:   No growth to date.    Radiology: all available radiological tests reviewed    EXAM:  US DPLX UPR EXT VEINS LTD RT                              PROCEDURE DATE:  2019          INTERPRETATION:  CLINICAL INFORMATION: Right arm swelling    COMPARISON: None available.    TECHNIQUE: Duplex sonography of the RIGHT UPPER extremity with color and   spectral Doppler, with and without compression.      FINDINGS:    The right internal jugular, subclavian, axillary, brachial, basilic and   cephalic veins are patent and compressible where applicable.     Doppler examination shows normal spontaneous and phasic flow.    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  Markedly increased velocities within the patient's arteriovenous fistula   raising suspicious for hemodynamically significant stenosis.    < end of copied text >    Advanced directives addressed: full resuscitation

## 2019-04-24 NOTE — PROGRESS NOTE ADULT - SUBJECTIVE AND OBJECTIVE BOX
CHIEF COMPLAINT:    SUBJECTIVE:   85 y/o male with ESRD on HD (MWF), HTN, RA on MTX and steroids, R leg amputation, severe CDI and megacolon, Severe PAD s/p LLE endarterectomy (4/1/19)with dr Clement presents with T 100.5 and DC from L groin.  Pt also c/o R arm pain/swelling where pt had fistulogram of the AC fistula on last admission.    4/24 Pt seen and examined at bedside. PT received dialysis today. Pt has no complaints  REVIEW OF SYSTEMS:  CONSTITUTIONAL: No weakness, fevers or chills  EYES/ENT: No visual changes;  No vertigo or throat pain   NECK: No pain or stiffness  RESPIRATORY: No cough, wheezing, hemoptysis; No shortness of breath  CARDIOVASCULAR: No chest pain or palpitations  GASTROINTESTINAL: No abdominal or epigastric pain. No nausea, vomiting, or hematemesis; No diarrhea or constipation. No melena or hematochezia.  GENITOURINARY: No dysuria, frequency or hematuria  NEUROLOGICAL: No numbness or weakness  SKIN: No itching, burning, rashes   All other review of systems is negative unless indicated above    Vital Signs Last 24 Hrs  T(C): 37.6 (24 Apr 2019 21:06), Max: 37.7 (24 Apr 2019 12:16)  T(F): 99.7 (24 Apr 2019 21:06), Max: 99.8 (24 Apr 2019 12:16)  HR: 83 (24 Apr 2019 22:00) (78 - 125)  BP: 100/16 (24 Apr 2019 22:00) (100/16 - 159/38)  BP(mean): 32 (24 Apr 2019 22:00) (32 - 105)  RR: 18 (24 Apr 2019 22:00) (13 - 23)  SpO2: 98% (24 Apr 2019 22:00) (92% - 100%)    I&O's Summary    23 Apr 2019 07:01  -  24 Apr 2019 07:00  --------------------------------------------------------  IN: 825 mL / OUT: 121 mL / NET: 704 mL    24 Apr 2019 07:01  -  24 Apr 2019 23:06  --------------------------------------------------------  IN: 0 mL / OUT: 10 mL / NET: -10 mL        CAPILLARY BLOOD GLUCOSE          PHYSICAL EXAM:  Constitutional: NAD, awake and alert, well-developed  HEENT: PERR, EOMI, Normal Hearing, MMM  Neck: Soft and supple, No LAD  Respiratory: Breath sounds are clear bilaterally, No wheezing, rales or rhonchi  Cardiovascular: S1 and S2, irregular rate and rhythm, no Murmurs, gallops or rubs  Gastrointestinal: Bowel Sounds present, soft, nontender, nondistended, no guarding, no rebound  Extremities: No peripheral edema. R BKA, Left leg has bandage at surgical site with drains containing serosanguinous fluid   Vascular: 2+ peripheral pulses  Neurological: A/O x 3, no focal deficits  Skin: No rashes    MEDICATIONS:  MEDICATIONS  (STANDING):  allopurinol 100 milliGRAM(s) Oral <User Schedule>  aspirin  chewable 81 milliGRAM(s) Oral daily  atorvastatin 20 milliGRAM(s) Oral at bedtime  cefepime  Injectable. 1000 milliGRAM(s) IV Push daily  cholestyramine Powder (Sugar-Free) 4 Gram(s) Oral daily  diltiazem    Tablet 60 milliGRAM(s) Oral four times a day  doxercalciferol Injectable 1 MICROGram(s) IV Push <User Schedule>  epoetin nelly Injectable 39369 Unit(s) IV Push <User Schedule>  finasteride 5 milliGRAM(s) Oral daily  lidocaine/prilocaine Cream 1 Application(s) Topical daily  pantoprazole    Tablet 40 milliGRAM(s) Oral before breakfast  predniSONE   Tablet 2.5 milliGRAM(s) Oral every other day  predniSONE   Tablet 5 milliGRAM(s) Oral every other day  sevelamer hydrochloride Oral Tab/Cap - Peds 800 milliGRAM(s) Oral three times a day with meals  silver sulfADIAZINE 1% Cream 1 Application(s) Topical daily  sodium chloride 0.9% lock flush 3 milliLiter(s) IV Push every 8 hours  vancomycin    Solution 125 milliGRAM(s) Oral every 6 hours  vancomycin  IVPB 1000 milliGRAM(s) IV Intermittent <User Schedule>      LABS: All Labs Reviewed:                        10.0   4.00  )-----------( 102      ( 24 Apr 2019 06:48 )             31.2     04-24    137  |  106  |  40<H>  ----------------------------<  90  4.7   |  23  |  4.16<H>    Ca    7.9<L>      24 Apr 2019 06:48  Phos  3.7     04-24    TPro  x   /  Alb  2.0<L>  /  TBili  x   /  DBili  x   /  AST  x   /  ALT  x   /  AlkPhos  x   04-24    PT/INR - ( 24 Apr 2019 06:48 )   PT: 11.7 sec;   INR: 1.05 ratio         PTT - ( 23 Apr 2019 07:02 )  PTT:29.9 sec      Blood Culture: 04-23 @ 15:08  Organism --  Gram Stain Blood -- Gram Stain --  Specimen Source .Surgical Swab left groin deep wound culture  Culture-Blood --    04-21 @ 08:40  Organism Enterococcus faecium (vancomycin resistant)  Gram Stain Blood -- Gram Stain --  Specimen Source .Abscess Leg - Left  Culture-Blood --        RADIOLOGY/EKG:  < from: US Duplex Arterial Lower Ext Ltd, Left (04.19.19 @ 12:13) >  Impression:    Focal ultrasound of the left groin demonstrates no evidence of   pseudoaneurysm.    Hematoma anterior to the left common femoral artery measuring   approximately 3.2 x 0.9 x 5.0 cm.    There is plaque within the proximal left femoral artery, with an elevated   velocity of 110 cm/s.        < from: US Duplex Venous Upper Ext Ltd, Right (04.19.19 @ 12:16) >  IMPRESSION:     No evidence of right upper extremity deep venous thrombosis.  Markedly increased velocities within the patient's arteriovenous fistula   raising suspicious for hemodynamically significant stenosis.    DVT PPX:  coumadin  ADVANCED DIRECTIVE:    DISPOSITION:

## 2019-04-24 NOTE — PROGRESS NOTE ADULT - SUBJECTIVE AND OBJECTIVE BOX
Pt has been seen and examined with FP resident, resident supervised agree with a/p       Patient is a 84y old  Male who presents with a chief complaint of L groin DC (24 Apr 2019 11:51)          PHYSICAL EXAM:  Vital Signs Last 24 Hrs  T(C): 37.7 (24 Apr 2019 12:16), Max: 37.7 (24 Apr 2019 12:16)  T(F): 99.8 (24 Apr 2019 12:16), Max: 99.8 (24 Apr 2019 12:16)  HR: 125 (24 Apr 2019 13:00) (77 - 125)  BP: 118/59 (24 Apr 2019 13:00) (118/59 - 159/38)  BP(mean): 73 (24 Apr 2019 13:00) (62 - 73)  RR: 22 (24 Apr 2019 13:00) (13 - 22)  SpO2: 99% (24 Apr 2019 13:00) (93% - 100%)  general- comfortable   -[id number]-[id number],cta  -cvs-[id number] normal   -p/a-soft,bs+          A/P    #Ct abx, supportive care, Discussed with Dr. Flanagan, Dr. Zamora

## 2019-04-24 NOTE — PROGRESS NOTE ADULT - SUBJECTIVE AND OBJECTIVE BOX
afebrile, VSS  complains of R groin incisional pain and lower leg wound discomfort  TRACY output noted; CBC noted    dressings intact  L foot about the same    A/P  status post muscle flap coverage L bovine pericardial patch (patent)  continue IV antibiotics  start coumadin tonight  local care to lower leg wound    MEDICATIONS  (STANDING):  allopurinol 100 milliGRAM(s) Oral <User Schedule>  aspirin  chewable 81 milliGRAM(s) Oral daily  atorvastatin 20 milliGRAM(s) Oral at bedtime  cefepime  Injectable. 1000 milliGRAM(s) IV Push daily  cholestyramine Powder (Sugar-Free) 4 Gram(s) Oral daily  diltiazem    Tablet 60 milliGRAM(s) Oral four times a day  doxercalciferol Injectable 1 MICROGram(s) IV Push <User Schedule>  epoetin nelly Injectable 07125 Unit(s) IV Push <User Schedule>  finasteride 5 milliGRAM(s) Oral daily  lidocaine/prilocaine Cream 1 Application(s) Topical daily  pantoprazole    Tablet 40 milliGRAM(s) Oral before breakfast  predniSONE   Tablet 2.5 milliGRAM(s) Oral every other day  predniSONE   Tablet 5 milliGRAM(s) Oral every other day  sevelamer hydrochloride Oral Tab/Cap - Peds 800 milliGRAM(s) Oral three times a day with meals  silver sulfADIAZINE 1% Cream 1 Application(s) Topical daily  sodium chloride 0.9% lock flush 3 milliLiter(s) IV Push every 8 hours  vancomycin    Solution 125 milliGRAM(s) Oral every 6 hours  vancomycin  IVPB 1000 milliGRAM(s) IV Intermittent <User Schedule>    MEDICATIONS  (PRN):  acetaminophen   Tablet .. 650 milliGRAM(s) Oral every 6 hours PRN Temp greater or equal to 38.5C (101.3F)  acetaminophen   Tablet .. 650 milliGRAM(s) Oral every 6 hours PRN Mild Pain (1 - 3)  oxyCODONE    5 mG/acetaminophen 325 mG 2 Tablet(s) Oral every 6 hours PRN Moderate Pain (4 - 6)      Allergies    Zosyn (Rash)    Intolerances        Flatus: [ ] YES [ ] NO             Bowel Movement: [ ] YES [ ] NO  Pain (0-10):            Pain Control Adequate: [ ] YES [ ] NO  Nausea: [ ] YES [ ] NO            Vomiting: [ ] YES [ ] NO  Diarrhea: [ ] YES [ ] NO         Constipation: [ ] YES [ ] NO     Chest Pain: [ ] YES [ ] NO    SOB:  [ ] YES [ ] NO    Vital Signs Last 24 Hrs  T(C): 36.6 (24 Apr 2019 04:50), Max: 37.1 (23 Apr 2019 10:59)  T(F): 97.8 (24 Apr 2019 04:50), Max: 98.7 (23 Apr 2019 10:59)  HR: 88 (24 Apr 2019 06:00) (77 - 110)  BP: 144/47 (24 Apr 2019 06:00) (109/46 - 157/42)  BP(mean): 72 (24 Apr 2019 06:00) (62 - 72)  RR: 19 (24 Apr 2019 06:00) (13 - 20)  SpO2: 98% (24 Apr 2019 06:00) (95% - 100%)    I&O's Summary    23 Apr 2019 07:01  -  24 Apr 2019 07:00  --------------------------------------------------------  IN: 825 mL / OUT: 121 mL / NET: 704 mL        Physical Exam:  General: NAD, resting comfortably  Pulmonary: normal resp effort, CTA-B  Cardiovascular: NSR  Abdominal: soft, NT/ND  Extremities: WWP, normal strength  Neuro: A/O x 3, CNs II-XII grossly intact, normal motor/sensation, no focal deficits  Pulses:   Right:                                                                          Left:  FEM [ ]2+ [ ]1+ [ ]doppler                                             FEM [ ]2+ [ ]1+ [ ]doppler    POP [ ]2+ [ ]1+ [ ]doppler                                             POP [ ]2+ [ ]1+ [ ]doppler    DP [ ]2+ [ ]1+ [ ]doppler                                                DP [ ]2+ [ ]1+ [ ]doppler  PT[ ]2+ [ ]1+ [ ]doppler                                                  PT [ ]2+ [ ]1+ [ ]doppler    LABS:                        10.0   4.00  )-----------( 102      ( 24 Apr 2019 06:48 )             31.2     04-24    137  |  106  |  40<H>  ----------------------------<  90  4.7   |  23  |  4.16<H>    Ca    7.9<L>      24 Apr 2019 06:48    TPro  x   /  Alb  2.0<L>  /  TBili  x   /  DBili  x   /  AST  x   /  ALT  x   /  AlkPhos  x   04-24    PT/INR - ( 24 Apr 2019 06:48 )   PT: 11.7 sec;   INR: 1.05 ratio         PTT - ( 23 Apr 2019 07:02 )  PTT:29.9 sec    LIVER FUNCTIONS - ( 24 Apr 2019 06:48 )  Alb: 2.0 g/dL / Pro: x     / ALK PHOS: x     / ALT: x     / AST: x     / GGT: x           CAPILLARY BLOOD GLUCOSE          RADIOLOGY & ADDITIONAL TESTS:

## 2019-04-24 NOTE — PROVIDER CONTACT NOTE (CRITICAL VALUE NOTIFICATION) - TEST AND RESULT REPORTED:
Left groin culture: gram stain with moderate enterococcus, vancomycin resistant, few proteus mirabilis (preliminary results)

## 2019-04-24 NOTE — PROGRESS NOTE ADULT - ASSESSMENT
85 y/o male with ESRD on HD (MWF), HTN, RA on MTX and steroids, R leg amputation, severe CDI and megacolon, Severe PAD s/p LLE endarterectomy 2 weeks ago with dr dugan presents with T 100 and DC from L groin.  Pt also c/o R arm pain/swelling where pt had fistulogram of the AC fistula on last admission.  RUL dopplers negative for DVT.  L groin sono--negative for pseudo aneurysm and + hematoma.  Pt given 2.1 L IVF, IV vanco/aztreonam in ED  On my exam in HD suite, awake, chronically ill appearing, NAD, daughter at bedside. Pt seen by Dr dugan in ED.  CXR--clear        4/20  s/p hd yesterday  prolonged bleed from access stopped w pressure  feels well  arm less tender  received 1 u prbc on hd yesterday  monitor cbc    4/22 SY  --ESRD : HD order and tx reviewed.   Tolerating tx well.  --AVF : as above.  Prolonged bleeding post tx.  Venous pressure acceptable today.  Monitor AVF function.  --Anemia : with acute loss.  Active infection leading to LETICIA hyporesponsiveness.  Transfuse 2 units today.  --PAD : post Left Ileofemoral Endarterectomy : Monitor Left foot perfusion.  --ID ; Left Groin infection : Continue abtx.  For debridement in am.    4/23  as above  For HD Wednesday  groin wash out today    4/24 SY  --ESRD : HD order and tx reviewed.  Tolerating tx well.  AVF cannulated without difficulty.  --Left Groin wound --Post  Muscle Flap Coverage.  Continue abtx.  --PAD : monitor perfusion.  --ID : continue Cefepime.

## 2019-04-24 NOTE — PROGRESS NOTE ADULT - SUBJECTIVE AND OBJECTIVE BOX
NEPHROLOGY INTERVAL HPI/OVERNIGHT EVENTS:    Date of Service: 04-24-19 @ 08:39  4/24 SY  Post Muscle Flap coverage of Left groin wound.  Reports pain in left foot.  Ate breakfast well.  No SOB    4/23  feels much better today  arm less swollen  for OR later washout groin wound  HD in am    4/22 SY  Alert.  No acute distress.  Complains of soreness in Left foot.  Continued pain in Left Groin area.  Right Arm and Elbow pain improved.    HPI:  85 y/o male with ESRD on HD (MWF), HTN, RA on MTX and steroids, R leg amputation, severe CDI and megacolon, Severe PAD s/p LLE endarterectomy 2 weeks ago with dr dugan presents with T 100 and DC from L groin.  Pt also c/o R arm pain/swelling where pt had fistulogram of the AC fistula on last admission.  RUL dopplers negative for DVT.  L groin sono--negative for pseudo aneurysm and + hematoma.  Pt given 2.1 L IVF, IV vanco/aztreonam in ED  On my exam in HD suite, awake, chronically ill appearing, NAD, daughter at bedside. Pt seen by Dr dugan in ED.  CXR--clear    TTE (10/18)--mild-mod MR/EF 60%    Pt d/w daughter regarding advance directives--Pt is FULL RESUSCITATION (19 Apr 2019 16:12)    MEDICATIONS  (STANDING):  allopurinol 100 milliGRAM(s) Oral <User Schedule>  aspirin  chewable 81 milliGRAM(s) Oral daily  atorvastatin 20 milliGRAM(s) Oral at bedtime  cefepime  Injectable. 1000 milliGRAM(s) IV Push daily  cholestyramine Powder (Sugar-Free) 4 Gram(s) Oral daily  diltiazem    Tablet 60 milliGRAM(s) Oral four times a day  doxercalciferol Injectable 1 MICROGram(s) IV Push <User Schedule>  epoetin nelly Injectable 19028 Unit(s) IV Push <User Schedule>  finasteride 5 milliGRAM(s) Oral daily  lidocaine/prilocaine Cream 1 Application(s) Topical daily  pantoprazole    Tablet 40 milliGRAM(s) Oral before breakfast  predniSONE   Tablet 2.5 milliGRAM(s) Oral every other day  predniSONE   Tablet 5 milliGRAM(s) Oral every other day  sevelamer hydrochloride Oral Tab/Cap - Peds 800 milliGRAM(s) Oral three times a day with meals  silver sulfADIAZINE 1% Cream 1 Application(s) Topical daily  sodium chloride 0.9% lock flush 3 milliLiter(s) IV Push every 8 hours  vancomycin    Solution 125 milliGRAM(s) Oral every 6 hours  vancomycin  IVPB 1000 milliGRAM(s) IV Intermittent <User Schedule>    MEDICATIONS  (PRN):  acetaminophen   Tablet .. 650 milliGRAM(s) Oral every 6 hours PRN Temp greater or equal to 38.5C (101.3F)  acetaminophen   Tablet .. 650 milliGRAM(s) Oral every 6 hours PRN Mild Pain (1 - 3)  oxyCODONE    5 mG/acetaminophen 325 mG 2 Tablet(s) Oral every 6 hours PRN Moderate Pain (4 - 6)          Vital Signs Last 24 Hrs  T(C): 36.6 (24 Apr 2019 04:50), Max: 37.1 (23 Apr 2019 10:59)  T(F): 97.8 (24 Apr 2019 04:50), Max: 98.7 (23 Apr 2019 10:59)  HR: 81 (24 Apr 2019 08:30) (77 - 110)  BP: 137/40 (24 Apr 2019 08:30) (109/46 - 157/42)  BP(mean): 72 (24 Apr 2019 06:00) (62 - 72)  RR: 17 (24 Apr 2019 08:30) (13 - 20)  SpO2: 100% (24 Apr 2019 08:30) (93% - 100%)  Daily     Daily     04-23 @ 07:01  -  04-24 @ 07:00  --------------------------------------------------------  IN: 825 mL / OUT: 121 mL / NET: 704 mL    PHYSICAL EXAM:  Alert and comfortable  GENERAL: No distress  CHEST/LUNG: Clear to aus  HEART: S1S2 RRR  ABDOMEN: soft  EXTREMITIES: Left foot no edema  SKIN:     LABS:                        10.0   4.00  )-----------( 102      ( 24 Apr 2019 06:48 )             31.2     04-24    137  |  106  |  40<H>  ----------------------------<  90  4.7   |  23  |  4.16<H>    Ca    7.9<L>      24 Apr 2019 06:48  Phos  3.7     04-24    TPro  x   /  Alb  2.0<L>  /  TBili  x   /  DBili  x   /  AST  x   /  ALT  x   /  AlkPhos  x   04-24    PT/INR - ( 24 Apr 2019 06:48 )   PT: 11.7 sec;   INR: 1.05 ratio         PTT - ( 23 Apr 2019 07:02 )  PTT:29.9 sec    Phosphorus Level, Serum: 3.7 mg/dL (04-24 @ 06:48)          RADIOLOGY & ADDITIONAL TESTS:

## 2019-04-24 NOTE — CHART NOTE - NSCHARTNOTEFT_GEN_A_CORE
was called by nurse to remove right femoral line as the patient has new access placed in his right upper extremity. Removed right femoral line with no difficulty, held pressure for 5 minutes and no active bleeding was noted.  No hematoma, swelling or erythema of the area noted. Placed a compression dressing on the site with 4x4s and tape. Pt tolerated the removal of the catheter well and he was left in stable condition.

## 2019-04-24 NOTE — PROGRESS NOTE ADULT - PROBLEM SELECTOR PLAN 1
Pt who had an endarterectomy April 1st who developed an infection at entry site and fever of 100.5  -now afebrile  -dopplers of the LLE showed hematoma anterior to the left common femoral artery measuring approximately 3.2 x 0.9 x 5.0 cm  -Dr. Clement recommendation appreciated:Debridement done  Pain control w/ dilaudid  s/p aztreonam  -continue cefepime Day #2, Vancomycin Day #6  -wound care: LLE dressing changes daily with silvadene, non adherent gauze, 4x4, ABD pad, Kerlix and ACE bandage, keep wound clean, apply 4x4s and tape QD and PRN  -wound cx growing E faecium. Vanc resistant  -ID consult appreciated

## 2019-04-25 LAB
-  AMPICILLIN: SIGNIFICANT CHANGE UP
-  AMPICILLIN: SIGNIFICANT CHANGE UP
-  DAPTOMYCIN: SIGNIFICANT CHANGE UP
-  DAPTOMYCIN: SIGNIFICANT CHANGE UP
-  LEVOFLOXACIN: SIGNIFICANT CHANGE UP
-  LEVOFLOXACIN: SIGNIFICANT CHANGE UP
-  LINEZOLID: SIGNIFICANT CHANGE UP
-  LINEZOLID: SIGNIFICANT CHANGE UP
-  TETRACYCLINE: SIGNIFICANT CHANGE UP
-  TETRACYCLINE: SIGNIFICANT CHANGE UP
-  VANCOMYCIN: SIGNIFICANT CHANGE UP
-  VANCOMYCIN: SIGNIFICANT CHANGE UP
ADD ON TEST-SPECIMEN IN LAB: SIGNIFICANT CHANGE UP
ANION GAP SERPL CALC-SCNC: 5 MMOL/L — SIGNIFICANT CHANGE UP (ref 5–17)
APTT BLD: 28.6 SEC — SIGNIFICANT CHANGE UP (ref 27.5–36.3)
BUN SERPL-MCNC: 24 MG/DL — HIGH (ref 7–23)
CALCIUM SERPL-MCNC: 7.9 MG/DL — LOW (ref 8.5–10.1)
CHLORIDE SERPL-SCNC: 102 MMOL/L — SIGNIFICANT CHANGE UP (ref 96–108)
CK SERPL-CCNC: 68 U/L — SIGNIFICANT CHANGE UP (ref 26–308)
CO2 SERPL-SCNC: 31 MMOL/L — SIGNIFICANT CHANGE UP (ref 22–31)
CREAT SERPL-MCNC: 3.15 MG/DL — HIGH (ref 0.5–1.3)
GLUCOSE SERPL-MCNC: 112 MG/DL — HIGH (ref 70–99)
HCT VFR BLD CALC: 32.3 % — LOW (ref 39–50)
HGB BLD-MCNC: 10.2 G/DL — LOW (ref 13–17)
INR BLD: 1.1 RATIO — SIGNIFICANT CHANGE UP (ref 0.88–1.16)
MCHC RBC-ENTMCNC: 31 PG — SIGNIFICANT CHANGE UP (ref 27–34)
MCHC RBC-ENTMCNC: 31.6 GM/DL — LOW (ref 32–36)
MCV RBC AUTO: 98.2 FL — SIGNIFICANT CHANGE UP (ref 80–100)
METHOD TYPE: SIGNIFICANT CHANGE UP
METHOD TYPE: SIGNIFICANT CHANGE UP
NRBC # BLD: 0 /100 WBCS — SIGNIFICANT CHANGE UP (ref 0–0)
PLATELET # BLD AUTO: 111 K/UL — LOW (ref 150–400)
POTASSIUM SERPL-MCNC: 4 MMOL/L — SIGNIFICANT CHANGE UP (ref 3.5–5.3)
POTASSIUM SERPL-SCNC: 4 MMOL/L — SIGNIFICANT CHANGE UP (ref 3.5–5.3)
PROTHROM AB SERPL-ACNC: 12.2 SEC — SIGNIFICANT CHANGE UP (ref 10–12.9)
RBC # BLD: 3.29 M/UL — LOW (ref 4.2–5.8)
RBC # FLD: 21.4 % — HIGH (ref 10.3–14.5)
SODIUM SERPL-SCNC: 138 MMOL/L — SIGNIFICANT CHANGE UP (ref 135–145)
WBC # BLD: 4.59 K/UL — SIGNIFICANT CHANGE UP (ref 3.8–10.5)
WBC # FLD AUTO: 4.59 K/UL — SIGNIFICANT CHANGE UP (ref 3.8–10.5)

## 2019-04-25 RX ORDER — WARFARIN SODIUM 2.5 MG/1
5 TABLET ORAL ONCE
Qty: 0 | Refills: 0 | Status: COMPLETED | OUTPATIENT
Start: 2019-04-25 | End: 2019-04-25

## 2019-04-25 RX ORDER — DAPTOMYCIN 500 MG/10ML
290 INJECTION, POWDER, LYOPHILIZED, FOR SOLUTION INTRAVENOUS
Qty: 0 | Refills: 0 | Status: DISCONTINUED | OUTPATIENT
Start: 2019-04-27 | End: 2019-05-02

## 2019-04-25 RX ORDER — DAPTOMYCIN 500 MG/10ML
290 INJECTION, POWDER, LYOPHILIZED, FOR SOLUTION INTRAVENOUS ONCE
Qty: 0 | Refills: 0 | Status: COMPLETED | OUTPATIENT
Start: 2019-04-25 | End: 2019-04-25

## 2019-04-25 RX ORDER — DAPTOMYCIN 500 MG/10ML
INJECTION, POWDER, LYOPHILIZED, FOR SOLUTION INTRAVENOUS
Qty: 0 | Refills: 0 | Status: DISCONTINUED | OUTPATIENT
Start: 2019-04-25 | End: 2019-05-02

## 2019-04-25 RX ADMIN — ATORVASTATIN CALCIUM 20 MILLIGRAM(S): 80 TABLET, FILM COATED ORAL at 22:44

## 2019-04-25 RX ADMIN — CEFEPIME 1000 MILLIGRAM(S): 1 INJECTION, POWDER, FOR SOLUTION INTRAMUSCULAR; INTRAVENOUS at 13:30

## 2019-04-25 RX ADMIN — Medication 81 MILLIGRAM(S): at 13:28

## 2019-04-25 RX ADMIN — OXYCODONE AND ACETAMINOPHEN 2 TABLET(S): 5; 325 TABLET ORAL at 14:30

## 2019-04-25 RX ADMIN — WARFARIN SODIUM 5 MILLIGRAM(S): 2.5 TABLET ORAL at 22:44

## 2019-04-25 RX ADMIN — Medication 1 APPLICATION(S): at 13:32

## 2019-04-25 RX ADMIN — SODIUM CHLORIDE 3 MILLILITER(S): 9 INJECTION INTRAMUSCULAR; INTRAVENOUS; SUBCUTANEOUS at 13:50

## 2019-04-25 RX ADMIN — SEVELAMER CARBONATE 800 MILLIGRAM(S): 2400 POWDER, FOR SUSPENSION ORAL at 09:21

## 2019-04-25 RX ADMIN — FINASTERIDE 5 MILLIGRAM(S): 5 TABLET, FILM COATED ORAL at 13:29

## 2019-04-25 RX ADMIN — SEVELAMER CARBONATE 800 MILLIGRAM(S): 2400 POWDER, FOR SUSPENSION ORAL at 17:22

## 2019-04-25 RX ADMIN — CHOLESTYRAMINE 4 GRAM(S): 4 POWDER, FOR SUSPENSION ORAL at 09:21

## 2019-04-25 RX ADMIN — DAPTOMYCIN 111.6 MILLIGRAM(S): 500 INJECTION, POWDER, LYOPHILIZED, FOR SOLUTION INTRAVENOUS at 13:28

## 2019-04-25 RX ADMIN — Medication 60 MILLIGRAM(S): at 22:44

## 2019-04-25 RX ADMIN — Medication 125 MILLIGRAM(S): at 05:15

## 2019-04-25 RX ADMIN — SODIUM CHLORIDE 3 MILLILITER(S): 9 INJECTION INTRAMUSCULAR; INTRAVENOUS; SUBCUTANEOUS at 21:11

## 2019-04-25 RX ADMIN — Medication 2.5 MILLIGRAM(S): at 13:31

## 2019-04-25 RX ADMIN — Medication 60 MILLIGRAM(S): at 17:22

## 2019-04-25 RX ADMIN — Medication 125 MILLIGRAM(S): at 13:30

## 2019-04-25 RX ADMIN — OXYCODONE AND ACETAMINOPHEN 2 TABLET(S): 5; 325 TABLET ORAL at 13:28

## 2019-04-25 RX ADMIN — PANTOPRAZOLE SODIUM 40 MILLIGRAM(S): 20 TABLET, DELAYED RELEASE ORAL at 05:16

## 2019-04-25 RX ADMIN — Medication 650 MILLIGRAM(S): at 23:24

## 2019-04-25 RX ADMIN — SODIUM CHLORIDE 3 MILLILITER(S): 9 INJECTION INTRAMUSCULAR; INTRAVENOUS; SUBCUTANEOUS at 04:04

## 2019-04-25 RX ADMIN — Medication 650 MILLIGRAM(S): at 22:45

## 2019-04-25 RX ADMIN — Medication 125 MILLIGRAM(S): at 17:22

## 2019-04-25 RX ADMIN — Medication 60 MILLIGRAM(S): at 05:15

## 2019-04-25 RX ADMIN — Medication 60 MILLIGRAM(S): at 13:27

## 2019-04-25 RX ADMIN — SEVELAMER CARBONATE 800 MILLIGRAM(S): 2400 POWDER, FOR SUSPENSION ORAL at 13:30

## 2019-04-25 RX ADMIN — Medication 125 MILLIGRAM(S): at 22:43

## 2019-04-25 NOTE — PROGRESS NOTE ADULT - SUBJECTIVE AND OBJECTIVE BOX
Pt has been seen and examined with FP resident, resident supervised agree with a/p       Patient is a 84y old  Male who presents with a chief complaint of L groin DC (24 Apr 2019 11:51)          PHYSICAL EXAM:    general- comfortable   -rs-aeeb,cta  -cvs-s1s2 normal   -p/a-soft,bs+          A/P    #Ct abx, supportive care, Discussed with Dr. Flanagan, Dr. Zamora

## 2019-04-25 NOTE — PROGRESS NOTE ADULT - ASSESSMENT
85 y/o male with ESRD on HD (MWF), HTN, RA on MTX and steroids, R leg amputation, severe CDI and megacolon, Severe PAD s/p LLE endarterectomy 2 weeks ago with dr dugan presents with T 100 and DC from L groin.  Pt also c/o R arm pain/swelling where pt had fistulogram of the AC fistula on last admission.  RUL dopplers negative for DVT.  L groin sono--negative for pseudo aneurysm and + hematoma, eval [facility] vascular surgery noted to have seropurulent drainage from L groin along medial flap and erythema along wound edges concerning for superimposed infection.  Pt given 2.1 L IVF, IV vanco/aztreonam in ED.     1. L groin wound infection/hematoma. s/p LLE endarterectomy. ESRD. PCN allergy  - s/p debridement 4/23 deep wound cx gram + cocci pairs/chains f/u final cx slowly improving   - superficial cx 4/21 growing proteus/VRE probable colonization S reviewed  - s/p vancomycin #6, switch to daptomycin check cpk deep wound cx ? VRE as well f/u final cx  - on cefepime 1gm daily #3  - s/p aztreonam  #4   - continue with abx coverage  - on c diff prophylaxis with oral vancomycin  - blood cx no growth  - monitor temps  - tolerating abx well so far; no side effects noted  - reason for abx use and side effects reviewed with patient  - supportive care  - f/u cbc    2. other issues - care per medicine

## 2019-04-25 NOTE — PROGRESS NOTE ADULT - SUBJECTIVE AND OBJECTIVE BOX
CHIEF COMPLAINT:    SUBJECTIVE:   85 y/o male with ESRD on HD (MWF), HTN, RA on MTX and steroids, R leg amputation, severe CDI and megacolon, Severe PAD s/p LLE endarterectomy (4/1/19)with dr Clement presents with T 100.5 and DC from L groin.  Pt also c/o R arm pain/swelling where pt had fistulogram of the AC fistula on last admission.    4/25 Pt seen and examined at bedside. Pt states that the left leg does cause pain but it is improved with the medications. Pt has no other complaints  REVIEW OF SYSTEMS:  CONSTITUTIONAL: No weakness, fevers or chills  EYES/ENT: No visual changes;  No vertigo or throat pain   NECK: No pain or stiffness  RESPIRATORY: No cough, wheezing, hemoptysis; No shortness of breath  CARDIOVASCULAR: No chest pain or palpitations  GASTROINTESTINAL: No abdominal or epigastric pain. No nausea, vomiting, or hematemesis; No diarrhea or constipation. No melena or hematochezia.  GENITOURINARY: No dysuria, frequency or hematuria  NEUROLOGICAL: No numbness or weakness  SKIN: No itching, burning, rashes, or lesions   All other review of systems is negative unless indicated above    Vital Signs Last 24 Hrs  T(C): 37.1 (25 Apr 2019 16:31), Max: 37.6 (24 Apr 2019 21:06)  T(F): 98.8 (25 Apr 2019 16:31), Max: 99.7 (24 Apr 2019 21:06)  HR: 76 (25 Apr 2019 16:00) (71 - 99)  BP: 123/87 (25 Apr 2019 16:00) (92/77 - 146/32)  BP(mean): 95 (25 Apr 2019 16:00) (32 - 113)  RR: 17 (25 Apr 2019 16:00) (15 - 23)  SpO2: 99% (25 Apr 2019 16:00) (93% - 100%)    I&O's Summary    24 Apr 2019 07:01  -  25 Apr 2019 07:00  --------------------------------------------------------  IN: 0 mL / OUT: 25 mL / NET: -25 mL        CAPILLARY BLOOD GLUCOSE          PHYSICAL EXAM:  Constitutional: NAD, awake and alert, well-developed  HEENT: PERR, EOMI, Normal Hearing, MMM  Neck: Soft and supple, No LAD  Respiratory: Breath sounds are clear bilaterally, No wheezing, rales or rhonchi  Cardiovascular: S1 and S2, irregular rate and rhythm, no Murmurs, gallops or rubs  Gastrointestinal: Bowel Sounds present, soft, nontender, nondistended, no guarding, no rebound  Extremities: No peripheral edema. R BKA, Left leg has bandage at surgical site with drains containing serosanguinous fluid   Vascular: 2+ peripheral pulses  Neurological: A/O x 3, no focal deficits  Skin: No rashes    MEDICATIONS:  MEDICATIONS  (STANDING):  allopurinol 100 milliGRAM(s) Oral <User Schedule>  aspirin  chewable 81 milliGRAM(s) Oral daily  atorvastatin 20 milliGRAM(s) Oral at bedtime  cefepime  Injectable. 1000 milliGRAM(s) IV Push daily  cholestyramine Powder (Sugar-Free) 4 Gram(s) Oral daily  DAPTOmycin IVPB      diltiazem    Tablet 60 milliGRAM(s) Oral four times a day  doxercalciferol Injectable 1 MICROGram(s) IV Push <User Schedule>  epoetin nelly Injectable 56108 Unit(s) IV Push <User Schedule>  finasteride 5 milliGRAM(s) Oral daily  lidocaine/prilocaine Cream 1 Application(s) Topical daily  pantoprazole    Tablet 40 milliGRAM(s) Oral before breakfast  predniSONE   Tablet 2.5 milliGRAM(s) Oral every other day  predniSONE   Tablet 5 milliGRAM(s) Oral every other day  sevelamer hydrochloride Oral Tab/Cap - Peds 800 milliGRAM(s) Oral three times a day with meals  silver sulfADIAZINE 1% Cream 1 Application(s) Topical daily  sodium chloride 0.9% lock flush 3 milliLiter(s) IV Push every 8 hours  vancomycin    Solution 125 milliGRAM(s) Oral every 6 hours  warfarin 5 milliGRAM(s) Oral once      LABS: All Labs Reviewed:                        10.2   4.59  )-----------( 111      ( 25 Apr 2019 06:44 )             32.3     04-25    138  |  102  |  24<H>  ----------------------------<  112<H>  4.0   |  31  |  3.15<H>    Ca    7.9<L>      25 Apr 2019 06:44  Phos  3.7     04-24    TPro  x   /  Alb  2.0<L>  /  TBili  x   /  DBili  x   /  AST  x   /  ALT  x   /  AlkPhos  x   04-24    PT/INR - ( 25 Apr 2019 06:44 )   PT: 12.2 sec;   INR: 1.10 ratio         PTT - ( 25 Apr 2019 06:44 )  PTT:28.6 sec  CARDIAC MARKERS ( 25 Apr 2019 06:44 )  x     / x     / 68 U/L / x     / x          Blood Culture: 04-23 @ 15:08  Organism Enterococcus faecium (vancomycin resistant)  Gram Stain Blood -- Gram Stain --  Specimen Source .Surgical Swab left groin deep wound culture  Culture-Blood --    04-21 @ 08:40  Organism Enterococcus faecium (vancomycin resistant)  Gram Stain Blood -- Gram Stain --  Specimen Source .Abscess Leg - Left  Culture-Blood --        RADIOLOGY/EKG:  < from: US Duplex Arterial Lower Ext Ltd, Left (04.19.19 @ 12:13) >  Impression:    Focal ultrasound of the left groin demonstrates no evidence of   pseudoaneurysm.    Hematoma anterior to the left common femoral artery measuring   approximately 3.2 x 0.9 x 5.0 cm.    There is plaque within the proximal left femoral artery, with an elevated   velocity of 110 cm/s.        < from: US Duplex Venous Upper Ext Ltd, Right (04.19.19 @ 12:16) >  IMPRESSION:     No evidence of right upper extremity deep venous thrombosis.  Markedly increased velocities within the patient's arteriovenous fistula   raising suspicious for hemodynamically significant stenosis.  DVT PPX:  coumadin  ADVANCED DIRECTIVE:    DISPOSITION:

## 2019-04-25 NOTE — DIETITIAN INITIAL EVALUATION ADULT. - PERTINENT LABORATORY DATA
04-25 Na138 mmol/L Glu 112 mg/dL<H> K+ 4.0 mmol/L Cr  3.15 mg/dL<H> BUN 24 mg/dL<H> Phos n/a   Alb n/a   PAB n/a

## 2019-04-25 NOTE — PROGRESS NOTE ADULT - PROBLEM SELECTOR PLAN 1
Pt who had an endarterectomy April 1st who developed an infection at entry site and fever of 100.5  -now afebrile  -dopplers of the LLE showed hematoma anterior to the left common femoral artery measuring approximately 3.2 x 0.9 x 5.0 cm  -Dr. Clement recommendation appreciated:Debridement done. States will monitor for a few more days  Pain control w/ dilaudid  s/p aztreonam  -continue cefepime Day #3, Vancomycin Day #7, Daptomycin added Day #1  -wound care  -wound cx growing E faecium. Vanc resistant  -ID consult appreciated

## 2019-04-25 NOTE — PROGRESS NOTE ADULT - SUBJECTIVE AND OBJECTIVE BOX
2 days s/p Exploration left groin wound, and muscle flap closure. Afebrile, VSS. Comfortable. Drains with minimal serosanguinous output, D/C'd. Incisions C/D/I. Groin skin slightly ecchymotic. Will continue to follow.

## 2019-04-25 NOTE — DIETITIAN INITIAL EVALUATION ADULT. - PERTINENT MEDS FT
MEDICATIONS  (STANDING):  allopurinol 100 milliGRAM(s) Oral <User Schedule>  aspirin  chewable 81 milliGRAM(s) Oral daily  atorvastatin 20 milliGRAM(s) Oral at bedtime  cefepime  Injectable. 1000 milliGRAM(s) IV Push daily  cholestyramine Powder (Sugar-Free) 4 Gram(s) Oral daily  DAPTOmycin IVPB      DAPTOmycin IVPB 290 milliGRAM(s) IV Intermittent once  diltiazem    Tablet 60 milliGRAM(s) Oral four times a day  doxercalciferol Injectable 1 MICROGram(s) IV Push <User Schedule>  epoetin nelly Injectable 88723 Unit(s) IV Push <User Schedule>  finasteride 5 milliGRAM(s) Oral daily  lidocaine/prilocaine Cream 1 Application(s) Topical daily  pantoprazole    Tablet 40 milliGRAM(s) Oral before breakfast  predniSONE   Tablet 2.5 milliGRAM(s) Oral every other day  predniSONE   Tablet 5 milliGRAM(s) Oral every other day  sevelamer hydrochloride Oral Tab/Cap - Peds 800 milliGRAM(s) Oral three times a day with meals  silver sulfADIAZINE 1% Cream 1 Application(s) Topical daily  sodium chloride 0.9% lock flush 3 milliLiter(s) IV Push every 8 hours  vancomycin    Solution 125 milliGRAM(s) Oral every 6 hours    MEDICATIONS  (PRN):  acetaminophen   Tablet .. 650 milliGRAM(s) Oral every 6 hours PRN Temp greater or equal to 38.5C (101.3F)  acetaminophen   Tablet .. 650 milliGRAM(s) Oral every 6 hours PRN Mild Pain (1 - 3)  HYDROmorphone  Injectable 0.5 milliGRAM(s) IV Push every 4 hours PRN breakthrough pain  oxyCODONE    5 mG/acetaminophen 325 mG 2 Tablet(s) Oral every 6 hours PRN Moderate Pain (4 - 6)

## 2019-04-25 NOTE — PROGRESS NOTE ADULT - SUBJECTIVE AND OBJECTIVE BOX
Date of service: 19 @ 11:04    pt seen and examined  s/p debridement of L groin wound   afebrile  feeling better    ROS: no fever or chills; denies dizziness, no HA, no SOB or cough, no abdominal pain, no diarrhea or constipation; no dysuria, no urinary frequency, no legs pain, no rashes      MEDICATIONS  (STANDING):  allopurinol 100 milliGRAM(s) Oral <User Schedule>  aspirin  chewable 81 milliGRAM(s) Oral daily  atorvastatin 20 milliGRAM(s) Oral at bedtime  cefepime  Injectable. 1000 milliGRAM(s) IV Push daily  cholestyramine Powder (Sugar-Free) 4 Gram(s) Oral daily  diltiazem    Tablet 60 milliGRAM(s) Oral four times a day  doxercalciferol Injectable 1 MICROGram(s) IV Push <User Schedule>  epoetin nelly Injectable 46346 Unit(s) IV Push <User Schedule>  finasteride 5 milliGRAM(s) Oral daily  lidocaine/prilocaine Cream 1 Application(s) Topical daily  pantoprazole    Tablet 40 milliGRAM(s) Oral before breakfast  predniSONE   Tablet 2.5 milliGRAM(s) Oral every other day  predniSONE   Tablet 5 milliGRAM(s) Oral every other day  sevelamer hydrochloride Oral Tab/Cap - Peds 800 milliGRAM(s) Oral three times a day with meals  silver sulfADIAZINE 1% Cream 1 Application(s) Topical daily  sodium chloride 0.9% lock flush 3 milliLiter(s) IV Push every 8 hours  vancomycin    Solution 125 milliGRAM(s) Oral every 6 hours  vancomycin  IVPB 1000 milliGRAM(s) IV Intermittent <User Schedule>      Vital Signs Last 24 Hrs  T(C): 36.8 (2019 06:25), Max: 37.7 (2019 12:16)  T(F): 98.2 (2019 06:25), Max: 99.8 (2019 12:16)  HR: 72 (2019 09:20) (71 - 125)  BP: 105/25 (2019 09:20) (98/45 - 159/38)  BP(mean): 43 (2019 09:20) (32 - 105)  RR: 17 (2019 09:20) (15 - 23)  SpO2: 99% (2019 09:20) (92% - 100%)      PE:  Constitutional: frail looking  HEENT: NC/AT, EOMI, PERRLA, conjunctivae clear; ears and nose atraumatic; pharynx benign  Neck: supple; thyroid not palpable  Back: no tenderness  Respiratory: respiratory effort normal; clear to auscultation  Cardiovascular: S1S2 regular, no murmurs  Abdomen: soft, not tender, not distended, positive BS; liver and spleen WNL  Genitourinary: no suprapubic tenderness  Lymphatic: no LN palpable  Musculoskeletal: no muscle tenderness, no joint swelling or tenderness  Extremities: R fistula   Neurological/ Psychiatric:  moving all extremities  Skin: L groin hematoma, ecchymosis, erythema, drainage no palpable lesions, LLE wound    Labs: all available labs reviewed                                      10.2   4.59  )-----------( 111      ( 2019 06:44 )             32.3     04-    138  |  102  |  24<H>  ----------------------------<  112<H>  4.0   |  31  |  3.15<H>    Ca    7.9<L>      2019 06:44  Phos  3.7     -    TPro  x   /  Alb  2.0<L>  /  TBili  x   /  DBili  x   /  AST  x   /  ALT  x   /  AlkPhos  x          Culture - Abscess with Gram Stain (19 @ 08:40)    -  Amikacin: S <=16    -  Levofloxacin: R >4    -  Levofloxacin: S <=2    -  Ciprofloxacin: S <=1    -  Daptomycin: S 2    -  Ceftriaxone: S <=1 Enterobacter, Citrobacter, and Serratia may develop resistance during prolonged therapy    -  Cefoxitin: S <=8    -  Cefepime: S <=4    -  Cefazolin: S <=8    -  Aztreonam: S <=4    -  Piperacillin/Tazobactam: S <=16    -  Tetra/Doxy: R >8    -  Linezolid: S 1    -  Ertapenem: S <=1    -  Gentamicin: S <=4    -  Tobramycin: S <=4    -  Meropenem: S <=1    -  Trimethoprim/Sulfamethoxazole: S <=2/38    -  Vancomycin: R >16    -  Ampicillin: R >8 Predicts results to ampicillin/sulbactam, amoxacillin-clavulanate and  piperacillin-tazobactam.    -  Ampicillin: S <=8 These ampicillin results predict results for amoxicillin    -  Ampicillin/Sulbactam: S <=8/4    Specimen Source: .Abscess Leg - Left    Culture Results:   Moderate Enterococcus faecium (vancomycin resistant)  Few Proteus mirabilis    Organism Identification: Enterococcus faecium (vancomycin resistant)  Proteus mirabilis    Organism: Enterococcus faecium (vancomycin resistant)    Organism: Proteus mirabilis    Method Type: DAVIDSON    Method Type: DAVIDSON        Urinalysis Basic - ( 2019 04:15 )    Color: Yellow / Appearance: Clear / S.020 / pH: x  Gluc: x / Ketone: Negative  / Bili: Moderate / Urobili: 1 mg/dL   Blood: x / Protein: 100 mg/dL / Nitrite: Negative   Leuk Esterase: Small / RBC: Negative /HPF / WBC 3-5   Sq Epi: x / Non Sq Epi: Negative / Bacteria: Few    Culture - Abscess with Gram Stain (19 @ 08:40)    -  Trimethoprim/Sulfamethoxazole: S <=2/38    -  Amikacin: S <=16    -  Ampicillin: R >8 Predicts results to ampicillin/sulbactam, amoxacillin-clavulanate and  piperacillin-tazobactam.    -  Ampicillin: S <=8 These ampicillin results predict results for amoxicillin    -  Aztreonam: S <=4    -  Cefazolin: S <=8    -  Cefepime: S <=4    -  Cefoxitin: S <=8    -  Ciprofloxacin: S <=1    -  Ertapenem: S <=1    -  Daptomycin: S 2    -  Vancomycin: R >16    -  Ampicillin/Sulbactam: S <=8/4    -  Ceftriaxone: S <=1 Enterobacter, Citrobacter, and Serratia may develop resistance during prolonged therapy    -  Gentamicin: S <=4    -  Levofloxacin: R >4    -  Levofloxacin: S <=2    -  Linezolid: S 1    -  Piperacillin/Tazobactam: S <=16    -  Tetra/Doxy: R >8    -  Meropenem: S <=1    -  Tobramycin: S <=4    Specimen Source: .Abscess Leg - Left    Culture Results:   Moderate Enterococcus faecium (vancomycin resistant)  Few Proteus mirabilis    Organism Identification: Enterococcus faecium (vancomycin resistant)  Proteus mirabilis    Organism: Enterococcus faecium (vancomycin resistant)    Organism: Proteus mirabilis    Method Type: DAVIDSON    Method Type: DAVIDSON        Culture - Blood (19 @ 10:37)    Specimen Source: .Blood None    Culture Results:   No growth to date.    Culture - Surgical Swab (19 @ 15:08)    Specimen Source: .Surgical Swab left groin deep wound culture    Culture Results:   Rare Gram Positive Cocci in Pairs and Chains        Radiology: all available radiological tests reviewed    EXAM:  US DPLX UPR EXT VEINS LTD RT                              PROCEDURE DATE:  2019          INTERPRETATION:  CLINICAL INFORMATION: Right arm swelling    COMPARISON: None available.    TECHNIQUE: Duplex sonography of the RIGHT UPPER extremity with color and   spectral Doppler, with and without compression.      FINDINGS:    The right internal jugular, subclavian, axillary, brachial, basilic and   cephalic veins are patent and compressible where applicable.     Doppler examination shows normal spontaneous and phasic flow.    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  Markedly increased velocities within the patient's arteriovenous fistula   raising suspicious for hemodynamically significant stenosis.    < end of copied text >    Advanced directives addressed: full resuscitation

## 2019-04-25 NOTE — PROGRESS NOTE ADULT - SUBJECTIVE AND OBJECTIVE BOX
NEPHROLOGY INTERVAL HPI/OVERNIGHT EVENTS:    Date of Service: 04-25-19 @ 09:21  4/25 SY  Feeling fair.  Continues with pain in Left groin and foot.  Pain control tolerable.    4/24 SY  Post Muscle Flap coverage of Left groin wound.  Reports pain in left foot.  Ate breakfast well.  No SOB    4/23  feels much better today  arm less swollen  for OR later washout groin wound  HD in am    4/22 SY  Alert.  No acute distress.  Complains of soreness in Left foot.  Continued pain in Left Groin area.  Right Arm and Elbow pain improved.    HPI:  83 y/o male with ESRD on HD (MWF), HTN, RA on MTX and steroids, R leg amputation, severe CDI and megacolon, Severe PAD s/p LLE endarterectomy 2 weeks ago with dr dugan presents with T 100 and DC from L groin.  Pt also c/o R arm pain/swelling where pt had fistulogram of the AC fistula on last admission.  RUL dopplers negative for DVT.  L groin sono--negative for pseudo aneurysm and + hematoma.  Pt given 2.1 L IVF, IV vanco/aztreonam in ED  On my exam in HD suite, awake, chronically ill appearing, NAD, daughter at bedside. Pt seen by Dr dugan in ED.  CXR--clear    TTE (10/18)--mild-mod MR/EF 60%    Pt d/w daughter regarding advance directives--Pt is FULL RESUSCITATION (19 Apr 2019 16:12)    MEDICATIONS  (STANDING):  allopurinol 100 milliGRAM(s) Oral <User Schedule>  aspirin  chewable 81 milliGRAM(s) Oral daily  atorvastatin 20 milliGRAM(s) Oral at bedtime  cefepime  Injectable. 1000 milliGRAM(s) IV Push daily  cholestyramine Powder (Sugar-Free) 4 Gram(s) Oral daily  diltiazem    Tablet 60 milliGRAM(s) Oral four times a day  doxercalciferol Injectable 1 MICROGram(s) IV Push <User Schedule>  epoetin nelly Injectable 02389 Unit(s) IV Push <User Schedule>  finasteride 5 milliGRAM(s) Oral daily  lidocaine/prilocaine Cream 1 Application(s) Topical daily  pantoprazole    Tablet 40 milliGRAM(s) Oral before breakfast  predniSONE   Tablet 2.5 milliGRAM(s) Oral every other day  predniSONE   Tablet 5 milliGRAM(s) Oral every other day  sevelamer hydrochloride Oral Tab/Cap - Peds 800 milliGRAM(s) Oral three times a day with meals  silver sulfADIAZINE 1% Cream 1 Application(s) Topical daily  sodium chloride 0.9% lock flush 3 milliLiter(s) IV Push every 8 hours  vancomycin    Solution 125 milliGRAM(s) Oral every 6 hours  vancomycin  IVPB 1000 milliGRAM(s) IV Intermittent <User Schedule>    MEDICATIONS  (PRN):  acetaminophen   Tablet .. 650 milliGRAM(s) Oral every 6 hours PRN Temp greater or equal to 38.5C (101.3F)  acetaminophen   Tablet .. 650 milliGRAM(s) Oral every 6 hours PRN Mild Pain (1 - 3)  HYDROmorphone  Injectable 0.5 milliGRAM(s) IV Push every 4 hours PRN breakthrough pain  oxyCODONE    5 mG/acetaminophen 325 mG 2 Tablet(s) Oral every 6 hours PRN Moderate Pain (4 - 6)    Vital Signs Last 24 Hrs  T(C): 36.8 (25 Apr 2019 06:25), Max: 37.7 (24 Apr 2019 12:16)  T(F): 98.2 (25 Apr 2019 06:25), Max: 99.8 (24 Apr 2019 12:16)  HR: 72 (25 Apr 2019 09:20) (71 - 125)  BP: 105/25 (25 Apr 2019 09:20) (98/45 - 159/38)  BP(mean): 43 (25 Apr 2019 09:20) (32 - 105)  RR: 17 (25 Apr 2019 09:20) (15 - 23)  SpO2: 99% (25 Apr 2019 09:20) (92% - 100%)  Daily     Daily     04-24 @ 07:01  -  04-25 @ 07:00  --------------------------------------------------------  IN: 0 mL / OUT: 25 mL / NET: -25 mL    PHYSICAL EXAM:  Alert and comfortable  GENERAL: No distress  CHEST/LUNG: fair air entry  HEART: S1S2 RRR  ABDOMEN: soft  EXTREMITIES: no edema  SKIN:     LABS:                        10.2   4.59  )-----------( 111      ( 25 Apr 2019 06:44 )             32.3     04-25    138  |  102  |  24<H>  ----------------------------<  112<H>  4.0   |  31  |  3.15<H>    Ca    7.9<L>      25 Apr 2019 06:44  Phos  3.7     04-24    TPro  x   /  Alb  2.0<L>  /  TBili  x   /  DBili  x   /  AST  x   /  ALT  x   /  AlkPhos  x   04-24    PT/INR - ( 25 Apr 2019 06:44 )   PT: 12.2 sec;   INR: 1.10 ratio         PTT - ( 25 Apr 2019 06:44 )  PTT:28.6 sec            RADIOLOGY & ADDITIONAL TESTS:

## 2019-04-25 NOTE — DIETITIAN INITIAL EVALUATION ADULT. - OTHER INFO
pt seen as LOS: 83yo male with PMH of ESRD on HD, HTN, Rt AKA, severe SDI and megacolon, severe PAD s/p LLE endarterectomy p/w fever and DC from L groin with additionl c/o Rt arm pain/swelling.  Pt being managed for surgical site infection, pancytopenia, and C.Diff (+).  Upon visit, pt appears well nourished.  Pt endorses purposeful wt loss 2/2 MD recommendations over the past 1.5 years of 76# (32%), clinically significant.  Diet recall reveals pt likely meeting estimated nutr needs.  (+) mild Lt thigh edema.  BM (+) 4/22.  omar score of 14, no PU noted.  Pt with increased protein needs 2/2 dialysis and infection.  RECOMMENDATIONS: 1) add gelatein once daily to optimize protein intake 2) add nephrovite daily to ensure 100% RDI met 3) daily wt checks

## 2019-04-25 NOTE — PROGRESS NOTE ADULT - ASSESSMENT
83 y/o male with ESRD on HD (MWF), HTN, RA on MTX and steroids, R leg amputation, severe CDI and megacolon, Severe PAD s/p LLE endarterectomy 2 weeks ago with dr dugan presents with T 100 and DC from L groin.  Pt also c/o R arm pain/swelling where pt had fistulogram of the AC fistula on last admission.  RUL dopplers negative for DVT.  L groin sono--negative for pseudo aneurysm and + hematoma.  Pt given 2.1 L IVF, IV vanco/aztreonam in ED  On my exam in HD suite, awake, chronically ill appearing, NAD, daughter at bedside. Pt seen by Dr dugan in ED.  CXR--clear    4/20  s/p hd yesterday  prolonged bleed from access stopped w pressure  feels well  arm less tender  received 1 u prbc on hd yesterday  monitor cbc    4/22 SY  --ESRD : HD order and tx reviewed.   Tolerating tx well.  --AVF : as above.  Prolonged bleeding post tx.  Venous pressure acceptable today.  Monitor AVF function.  --Anemia : with acute loss.  Active infection leading to LETICIA hyporesponsiveness.  Transfuse 2 units today.  --PAD : post Left Ileofemoral Endarterectomy : Monitor Left foot perfusion.  --ID ; Left Groin infection : Continue abtx.  For debridement in am.    4/23  as above  For HD Wednesday  groin wash out today    4/24 SY  --ESRD : HD order and tx reviewed.  Tolerating tx well.  AVF cannulated without difficulty.  --Left Groin wound --Post  Muscle Flap Coverage.  Continue abtx.  --PAD : monitor perfusion.  --ID : continue Cefepime.    4/25 SY  --ESRD : Continue TIW HD  --AVF : cannulated without difficulty.  RUE edema much improved.  --Left Groin wound : post Muscle Flap Coverage.   Continue abtx.  --ID: continue abtx.  --PAD : post Left Ileofemoral endarterectomy : monitor perfusion.

## 2019-04-25 NOTE — DIETITIAN INITIAL EVALUATION ADULT. - ENERGY NEEDS
Ht. 67 "     Wt. 72.1 Kg       no correct 2/2 Rt AKA BMI       IBW  60 Kg      Pt is at 121   %  IBW

## 2019-04-26 LAB
ADD ON TEST-SPECIMEN IN LAB: SIGNIFICANT CHANGE UP
ALBUMIN SERPL ELPH-MCNC: 1.8 G/DL — LOW (ref 3.3–5)
ANION GAP SERPL CALC-SCNC: 8 MMOL/L — SIGNIFICANT CHANGE UP (ref 5–17)
BASOPHILS # BLD AUTO: 0.03 K/UL — SIGNIFICANT CHANGE UP (ref 0–0.2)
BASOPHILS NFR BLD AUTO: 0.7 % — SIGNIFICANT CHANGE UP (ref 0–2)
BUN SERPL-MCNC: 33 MG/DL — HIGH (ref 7–23)
CALCIUM SERPL-MCNC: 7.9 MG/DL — LOW (ref 8.5–10.1)
CHLORIDE SERPL-SCNC: 101 MMOL/L — SIGNIFICANT CHANGE UP (ref 96–108)
CO2 SERPL-SCNC: 26 MMOL/L — SIGNIFICANT CHANGE UP (ref 22–31)
CREAT SERPL-MCNC: 4.23 MG/DL — HIGH (ref 0.5–1.3)
CULTURE RESULTS: SIGNIFICANT CHANGE UP
EOSINOPHIL # BLD AUTO: 0.32 K/UL — SIGNIFICANT CHANGE UP (ref 0–0.5)
EOSINOPHIL NFR BLD AUTO: 7.4 % — HIGH (ref 0–6)
GLUCOSE SERPL-MCNC: 99 MG/DL — SIGNIFICANT CHANGE UP (ref 70–99)
HCT VFR BLD CALC: 31.3 % — LOW (ref 39–50)
HGB BLD-MCNC: 9.7 G/DL — LOW (ref 13–17)
IMM GRANULOCYTES NFR BLD AUTO: 5.8 % — HIGH (ref 0–1.5)
INR BLD: 1.67 RATIO — HIGH (ref 0.88–1.16)
LYMPHOCYTES # BLD AUTO: 0.63 K/UL — LOW (ref 1–3.3)
LYMPHOCYTES # BLD AUTO: 14.7 % — SIGNIFICANT CHANGE UP (ref 13–44)
MCHC RBC-ENTMCNC: 31 GM/DL — LOW (ref 32–36)
MCHC RBC-ENTMCNC: 31 PG — SIGNIFICANT CHANGE UP (ref 27–34)
MCV RBC AUTO: 100 FL — SIGNIFICANT CHANGE UP (ref 80–100)
MONOCYTES # BLD AUTO: 1.32 K/UL — HIGH (ref 0–0.9)
MONOCYTES NFR BLD AUTO: 30.7 % — HIGH (ref 2–14)
NEUTROPHILS # BLD AUTO: 1.75 K/UL — LOW (ref 1.8–7.4)
NEUTROPHILS NFR BLD AUTO: 40.7 % — LOW (ref 43–77)
NRBC # BLD: 0 /100 WBCS — SIGNIFICANT CHANGE UP (ref 0–0)
ORGANISM # SPEC MICROSCOPIC CNT: SIGNIFICANT CHANGE UP
PHOSPHATE SERPL-MCNC: 3.1 MG/DL — SIGNIFICANT CHANGE UP (ref 2.5–4.5)
PLATELET # BLD AUTO: 134 K/UL — LOW (ref 150–400)
POTASSIUM SERPL-MCNC: 4 MMOL/L — SIGNIFICANT CHANGE UP (ref 3.5–5.3)
POTASSIUM SERPL-SCNC: 4 MMOL/L — SIGNIFICANT CHANGE UP (ref 3.5–5.3)
PROTHROM AB SERPL-ACNC: 18.8 SEC — HIGH (ref 10–12.9)
RBC # BLD: 3.13 M/UL — LOW (ref 4.2–5.8)
RBC # FLD: 20.6 % — HIGH (ref 10.3–14.5)
SODIUM SERPL-SCNC: 135 MMOL/L — SIGNIFICANT CHANGE UP (ref 135–145)
SPECIMEN SOURCE: SIGNIFICANT CHANGE UP
SURGICAL PATHOLOGY STUDY: SIGNIFICANT CHANGE UP
WBC # BLD: 4.3 K/UL — SIGNIFICANT CHANGE UP (ref 3.8–10.5)
WBC # FLD AUTO: 4.3 K/UL — SIGNIFICANT CHANGE UP (ref 3.8–10.5)

## 2019-04-26 RX ORDER — ALLOPURINOL 300 MG
100 TABLET ORAL
Qty: 0 | Refills: 0 | Status: DISCONTINUED | OUTPATIENT
Start: 2019-04-26 | End: 2019-05-06

## 2019-04-26 RX ORDER — WARFARIN SODIUM 2.5 MG/1
3 TABLET ORAL ONCE
Qty: 0 | Refills: 0 | Status: COMPLETED | OUTPATIENT
Start: 2019-04-26 | End: 2019-04-26

## 2019-04-26 RX ADMIN — FINASTERIDE 5 MILLIGRAM(S): 5 TABLET, FILM COATED ORAL at 15:04

## 2019-04-26 RX ADMIN — Medication 100 MILLIGRAM(S): at 09:35

## 2019-04-26 RX ADMIN — OXYCODONE AND ACETAMINOPHEN 2 TABLET(S): 5; 325 TABLET ORAL at 16:15

## 2019-04-26 RX ADMIN — Medication 81 MILLIGRAM(S): at 15:04

## 2019-04-26 RX ADMIN — LIDOCAINE AND PRILOCAINE CREAM 1 APPLICATION(S): 25; 25 CREAM TOPICAL at 09:35

## 2019-04-26 RX ADMIN — OXYCODONE AND ACETAMINOPHEN 2 TABLET(S): 5; 325 TABLET ORAL at 15:21

## 2019-04-26 RX ADMIN — Medication 125 MILLIGRAM(S): at 15:05

## 2019-04-26 RX ADMIN — ATORVASTATIN CALCIUM 20 MILLIGRAM(S): 80 TABLET, FILM COATED ORAL at 23:19

## 2019-04-26 RX ADMIN — PANTOPRAZOLE SODIUM 40 MILLIGRAM(S): 20 TABLET, DELAYED RELEASE ORAL at 05:15

## 2019-04-26 RX ADMIN — Medication 125 MILLIGRAM(S): at 23:20

## 2019-04-26 RX ADMIN — SEVELAMER CARBONATE 800 MILLIGRAM(S): 2400 POWDER, FOR SUSPENSION ORAL at 09:34

## 2019-04-26 RX ADMIN — WARFARIN SODIUM 3 MILLIGRAM(S): 2.5 TABLET ORAL at 23:19

## 2019-04-26 RX ADMIN — ERYTHROPOIETIN 10000 UNIT(S): 10000 INJECTION, SOLUTION INTRAVENOUS; SUBCUTANEOUS at 13:05

## 2019-04-26 RX ADMIN — CHOLESTYRAMINE 4 GRAM(S): 4 POWDER, FOR SUSPENSION ORAL at 09:34

## 2019-04-26 RX ADMIN — Medication 60 MILLIGRAM(S): at 05:15

## 2019-04-26 RX ADMIN — DOXERCALCIFEROL 1 MICROGRAM(S): 2.5 CAPSULE ORAL at 13:06

## 2019-04-26 RX ADMIN — Medication 60 MILLIGRAM(S): at 15:04

## 2019-04-26 RX ADMIN — Medication 60 MILLIGRAM(S): at 23:20

## 2019-04-26 RX ADMIN — Medication 1 APPLICATION(S): at 15:15

## 2019-04-26 RX ADMIN — HYDROMORPHONE HYDROCHLORIDE 0.5 MILLIGRAM(S): 2 INJECTION INTRAMUSCULAR; INTRAVENOUS; SUBCUTANEOUS at 17:10

## 2019-04-26 RX ADMIN — SODIUM CHLORIDE 3 MILLILITER(S): 9 INJECTION INTRAMUSCULAR; INTRAVENOUS; SUBCUTANEOUS at 16:00

## 2019-04-26 RX ADMIN — Medication 5 MILLIGRAM(S): at 15:04

## 2019-04-26 RX ADMIN — Medication 125 MILLIGRAM(S): at 05:15

## 2019-04-26 RX ADMIN — HYDROMORPHONE HYDROCHLORIDE 0.5 MILLIGRAM(S): 2 INJECTION INTRAMUSCULAR; INTRAVENOUS; SUBCUTANEOUS at 17:40

## 2019-04-26 RX ADMIN — SODIUM CHLORIDE 3 MILLILITER(S): 9 INJECTION INTRAMUSCULAR; INTRAVENOUS; SUBCUTANEOUS at 22:50

## 2019-04-26 RX ADMIN — SODIUM CHLORIDE 3 MILLILITER(S): 9 INJECTION INTRAMUSCULAR; INTRAVENOUS; SUBCUTANEOUS at 04:36

## 2019-04-26 RX ADMIN — Medication 125 MILLIGRAM(S): at 19:49

## 2019-04-26 RX ADMIN — CEFEPIME 1000 MILLIGRAM(S): 1 INJECTION, POWDER, FOR SOLUTION INTRAMUSCULAR; INTRAVENOUS at 15:05

## 2019-04-26 RX ADMIN — SEVELAMER CARBONATE 800 MILLIGRAM(S): 2400 POWDER, FOR SUSPENSION ORAL at 17:12

## 2019-04-26 NOTE — PROGRESS NOTE ADULT - ASSESSMENT
85 y/o male with ESRD on HD (MWF), HTN, RA on MTX and steroids, R leg amputation, severe CDI and megacolon, Severe PAD s/p LLE endarterectomy 2 weeks ago with dr dugan presents with T 100 and DC from L groin.  Pt also c/o R arm pain/swelling where pt had fistulogram of the AC fistula on last admission.  RUL dopplers negative for DVT.  L groin sono--negative for pseudo aneurysm and + hematoma.  Pt given 2.1 L IVF, IV vanco/aztreonam in ED  On my exam in HD suite, awake, chronically ill appearing, NAD, daughter at bedside. Pt seen by Dr dugan in ED.  CXR--clear    4/20  s/p hd yesterday  prolonged bleed from access stopped w pressure  feels well  arm less tender  received 1 u prbc on hd yesterday  monitor cbc    4/22 SY  --ESRD : HD order and tx reviewed.   Tolerating tx well.  --AVF : as above.  Prolonged bleeding post tx.  Venous pressure acceptable today.  Monitor AVF function.  --Anemia : with acute loss.  Active infection leading to LETICIA hyporesponsiveness.  Transfuse 2 units today.  --PAD : post Left Ileofemoral Endarterectomy : Monitor Left foot perfusion.  --ID ; Left Groin infection : Continue abtx.  For debridement in am.    4/23  as above  For HD Wednesday  groin wash out today    4/24 SY  --ESRD : HD order and tx reviewed.  Tolerating tx well.  AVF cannulated without difficulty.  --Left Groin wound --Post  Muscle Flap Coverage.  Continue abtx.  --PAD : monitor perfusion.  --ID : continue Cefepime.    4/25 SY  --ESRD : Continue TIW HD  --AVF : cannulated without difficulty.  RUE edema much improved.  --Left Groin wound : post Muscle Flap Coverage.   Continue abtx.  --ID: continue abtx.  --PAD : post Left Ileofemoral endarterectomy : monitor perfusion.    4/26  seen on hd  in good spirits   stable on hd  fluid removal reduced due to low BP  hgb stable

## 2019-04-26 NOTE — PROGRESS NOTE ADULT - PROBLEM SELECTOR PLAN 1
Pt who had an endarterectomy April 1st who developed an infection at entry site and fever of 100.5  -now afebrile  -dopplers of the LLE showed hematoma anterior to the left common femoral artery measuring approximately 3.2 x 0.9 x 5.0 cm  -Dr. Clement recommendation appreciated:Debridement done. States will monitor for a few more days  Pain control w/ dilaudid  s/p aztreonam  -continue cefepime Day #4, Vancomycin for c diff prophylaxis Day #8, Daptomycin added Day #2  -wound care  -wound cx growing E faecium. Vanc resistant  -ID consult appreciated

## 2019-04-26 NOTE — PROGRESS NOTE ADULT - SUBJECTIVE AND OBJECTIVE BOX
Date of service: 19 @ 10:17    pt seen and examined  s/p debridement/muscle flap closure of L groin wound   afebrile  feeling better  no o/n events    ROS: no fever or chills; denies dizziness, no HA, no SOB or cough, no abdominal pain, no diarrhea or constipation; no dysuria, no urinary frequency, no legs pain, no rashes    MEDICATIONS  (STANDING):  allopurinol 100 milliGRAM(s) Oral <User Schedule>  aspirin  chewable 81 milliGRAM(s) Oral daily  atorvastatin 20 milliGRAM(s) Oral at bedtime  cefepime  Injectable. 1000 milliGRAM(s) IV Push daily  cholestyramine Powder (Sugar-Free) 4 Gram(s) Oral daily  DAPTOmycin IVPB      diltiazem    Tablet 60 milliGRAM(s) Oral four times a day  doxercalciferol Injectable 1 MICROGram(s) IV Push <User Schedule>  epoetin nelly Injectable 54340 Unit(s) IV Push <User Schedule>  finasteride 5 milliGRAM(s) Oral daily  lidocaine/prilocaine Cream 1 Application(s) Topical daily  pantoprazole    Tablet 40 milliGRAM(s) Oral before breakfast  predniSONE   Tablet 2.5 milliGRAM(s) Oral every other day  predniSONE   Tablet 5 milliGRAM(s) Oral every other day  sevelamer hydrochloride Oral Tab/Cap - Peds 800 milliGRAM(s) Oral three times a day with meals  silver sulfADIAZINE 1% Cream 1 Application(s) Topical daily  sodium chloride 0.9% lock flush 3 milliLiter(s) IV Push every 8 hours  vancomycin    Solution 125 milliGRAM(s) Oral every 6 hours      Vital Signs Last 24 Hrs  T(C): 37 (2019 09:14), Max: 37.1 (2019 16:31)  T(F): 98.6 (2019 09:14), Max: 98.8 (2019 16:31)  HR: 71 (2019 09:00) (71 - 84)  BP: 128/108 (2019 09:00) (92/77 - 168/45)  BP(mean): 112 (2019 09:00) (40 - 113)  RR: 17 (2019 09:00) (16 - 24)  SpO2: 100% (2019 09:00) (90% - 100%)      PE:  Constitutional: frail looking  HEENT: NC/AT, EOMI, PERRLA, conjunctivae clear; ears and nose atraumatic; pharynx benign  Neck: supple; thyroid not palpable  Back: no tenderness  Respiratory: respiratory effort normal; clear to auscultation  Cardiovascular: S1S2 regular, no murmurs  Abdomen: soft, not tender, not distended, positive BS; liver and spleen WNL  Genitourinary: no suprapubic tenderness  Lymphatic: no LN palpable  Musculoskeletal: no muscle tenderness, no joint swelling or tenderness  Extremities: R fistula   Neurological/ Psychiatric:  moving all extremities  Skin: L groin  erythema, ecchymosis, staples intact, no rainage no palpable lesions, LLE wound    Labs: all available labs reviewed                                   9.7    4.30  )-----------( 134      ( 2019 06:00 )             31.3         135  |  101  |  33<H>  ----------------------------<  99  4.0   |  26  |  4.23<H>    Ca    7.9<L>      2019 06:00  Phos  3.1         TPro  x   /  Alb  1.8<L>  /  TBili  x   /  DBili  x   /  AST  x   /  ALT  x   /  AlkPhos  x     Culture - Surgical Swab (19 @ 15:08)    -  Vancomycin: R >16    -  Tetra/Doxy: R >8    -  Linezolid: S 2    -  Levofloxacin: R >4    -  Daptomycin: S 4    -  Ampicillin: R >8 Predicts results to ampicillin/sulbactam, amoxacillin-clavulanate and  piperacillin-tazobactam.    Specimen Source: .Surgical Swab left groin deep wound culture    Culture Results:   Rare Enterococcus faecium (vancomycin resistant)    Organism Identification: Enterococcus faecium (vancomycin resistant)    Organism: Enterococcus faecium (vancomycin resistant)    Method Type: DAVIDSON           Culture - Abscess with Gram Stain (19 @ 08:40)    -  Amikacin: S <=16    -  Levofloxacin: R >4    -  Levofloxacin: S <=2    -  Ciprofloxacin: S <=1    -  Daptomycin: S 2    -  Ceftriaxone: S <=1 Enterobacter, Citrobacter, and Serratia may develop resistance during prolonged therapy    -  Cefoxitin: S <=8    -  Cefepime: S <=4    -  Cefazolin: S <=8    -  Aztreonam: S <=4    -  Piperacillin/Tazobactam: S <=16    -  Tetra/Doxy: R >8    -  Linezolid: S 1    -  Ertapenem: S <=1    -  Gentamicin: S <=4    -  Tobramycin: S <=4    -  Meropenem: S <=1    -  Trimethoprim/Sulfamethoxazole: S <=2/38    -  Vancomycin: R >16    -  Ampicillin: R >8 Predicts results to ampicillin/sulbactam, amoxacillin-clavulanate and  piperacillin-tazobactam.    -  Ampicillin: S <=8 These ampicillin results predict results for amoxicillin    -  Ampicillin/Sulbactam: S <=8/4    Specimen Source: .Abscess Leg - Left    Culture Results:   Moderate Enterococcus faecium (vancomycin resistant)  Few Proteus mirabilis    Organism Identification: Enterococcus faecium (vancomycin resistant)  Proteus mirabilis    Organism: Enterococcus faecium (vancomycin resistant)    Organism: Proteus mirabilis    Method Type: DAVIDSON    Method Type: DAVIDSON        Urinalysis Basic - ( 2019 04:15 )    Color: Yellow / Appearance: Clear / S.020 / pH: x  Gluc: x / Ketone: Negative  / Bili: Moderate / Urobili: 1 mg/dL   Blood: x / Protein: 100 mg/dL / Nitrite: Negative   Leuk Esterase: Small / RBC: Negative /HPF / WBC 3-5   Sq Epi: x / Non Sq Epi: Negative / Bacteria: Few    Culture - Abscess with Gram Stain (19 @ 08:40)    -  Trimethoprim/Sulfamethoxazole: S <=2/38    -  Amikacin: S <=16    -  Ampicillin: R >8 Predicts results to ampicillin/sulbactam, amoxacillin-clavulanate and  piperacillin-tazobactam.    -  Ampicillin: S <=8 These ampicillin results predict results for amoxicillin    -  Aztreonam: S <=4    -  Cefazolin: S <=8    -  Cefepime: S <=4    -  Cefoxitin: S <=8    -  Ciprofloxacin: S <=1    -  Ertapenem: S <=1    -  Daptomycin: S 2    -  Vancomycin: R >16    -  Ampicillin/Sulbactam: S <=8/4    -  Ceftriaxone: S <=1 Enterobacter, Citrobacter, and Serratia may develop resistance during prolonged therapy    -  Gentamicin: S <=4    -  Levofloxacin: R >4    -  Levofloxacin: S <=2    -  Linezolid: S 1    -  Piperacillin/Tazobactam: S <=16    -  Tetra/Doxy: R >8    -  Meropenem: S <=1    -  Tobramycin: S <=4    Specimen Source: .Abscess Leg - Left    Culture Results:   Moderate Enterococcus faecium (vancomycin resistant)  Few Proteus mirabilis    Organism Identification: Enterococcus faecium (vancomycin resistant)  Proteus mirabilis    Organism: Enterococcus faecium (vancomycin resistant)    Organism: Proteus mirabilis    Method Type: DAVIDSON    Method Type: DAVIDSON        Culture - Blood (19 @ 10:37)    Specimen Source: .Blood None    Culture Results:   No growth to date.    Culture - Surgical Swab (19 @ 15:08)    Specimen Source: .Surgical Swab left groin deep wound culture    Culture Results:   Rare Gram Positive Cocci in Pairs and Chains        Radiology: all available radiological tests reviewed    EXAM:  US DPLX UPR EXT VEINS LTD RT                              PROCEDURE DATE:  2019          INTERPRETATION:  CLINICAL INFORMATION: Right arm swelling    COMPARISON: None available.    TECHNIQUE: Duplex sonography of the RIGHT UPPER extremity with color and   spectral Doppler, with and without compression.      FINDINGS:    The right internal jugular, subclavian, axillary, brachial, basilic and   cephalic veins are patent and compressible where applicable.     Doppler examination shows normal spontaneous and phasic flow.    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  Markedly increased velocities within the patient's arteriovenous fistula   raising suspicious for hemodynamically significant stenosis.    < end of copied text >    Advanced directives addressed: full resuscitation

## 2019-04-26 NOTE — PROGRESS NOTE ADULT - SUBJECTIVE AND OBJECTIVE BOX
NEPHROLOGY INTERVAL HPI/OVERNIGHT EVENTS:        4/26  stable groin flap  in SSD  seen on hd  in good spirits   stable on hd  fluid removal reduced due to low BP  hgb stable    4/25 SY  Feeling fair.  Continues with pain in Left groin and foot.  Pain control tolerable.    4/24 SY  Post Muscle Flap coverage of Left groin wound.  Reports pain in left foot.  Ate breakfast well.  No SOB    4/23  feels much better today  arm less swollen  for OR later washout groin wound  HD in am    4/22 SY  Alert.  No acute distress.  Complains of soreness in Left foot.  Continued pain in Left Groin area.  Right Arm and Elbow pain improved.    HPI:  85 y/o male with ESRD on HD (MWF), HTN, RA on MTX and steroids, R leg amputation, severe CDI and megacolon, Severe PAD s/p LLE endarterectomy 2 weeks ago with dr dugan presents with T 100 and DC from L groin.  Pt also c/o R arm pain/swelling where pt had fistulogram of the AC fistula on last admission.  RUL dopplers negative for DVT.  L groin sono--negative for pseudo aneurysm and + hematoma.  Pt given 2.1 L IVF, IV vanco/aztreonam in ED  On my exam in HD suite, awake, chronically ill appearing, NAD, daughter at bedside. Pt seen by Dr dugan in ED.  CXR--clear    TTE (10/18)--mild-mod MR/EF 60%    Pt d/w daughter regarding advance directives--Pt is FULL RESUSCITATION (19 Apr 2019 16:12)    MEDICATIONS  (STANDING):  allopurinol 100 milliGRAM(s) Oral <User Schedule>  aspirin  chewable 81 milliGRAM(s) Oral daily  atorvastatin 20 milliGRAM(s) Oral at bedtime  cefepime  Injectable. 1000 milliGRAM(s) IV Push daily  cholestyramine Powder (Sugar-Free) 4 Gram(s) Oral daily  DAPTOmycin IVPB      diltiazem    Tablet 60 milliGRAM(s) Oral four times a day  doxercalciferol Injectable 1 MICROGram(s) IV Push <User Schedule>  epoetin nelly Injectable 53718 Unit(s) IV Push <User Schedule>  finasteride 5 milliGRAM(s) Oral daily  lidocaine/prilocaine Cream 1 Application(s) Topical daily  pantoprazole    Tablet 40 milliGRAM(s) Oral before breakfast  predniSONE   Tablet 2.5 milliGRAM(s) Oral every other day  predniSONE   Tablet 5 milliGRAM(s) Oral every other day  sevelamer hydrochloride Oral Tab/Cap - Peds 800 milliGRAM(s) Oral three times a day with meals  silver sulfADIAZINE 1% Cream 1 Application(s) Topical daily  sodium chloride 0.9% lock flush 3 milliLiter(s) IV Push every 8 hours  vancomycin    Solution 125 milliGRAM(s) Oral every 6 hours  warfarin 3 milliGRAM(s) Oral once    MEDICATIONS  (PRN):  acetaminophen   Tablet .. 650 milliGRAM(s) Oral every 6 hours PRN Temp greater or equal to 38.5C (101.3F)  acetaminophen   Tablet .. 650 milliGRAM(s) Oral every 6 hours PRN Mild Pain (1 - 3)  HYDROmorphone  Injectable 0.5 milliGRAM(s) IV Push every 4 hours PRN breakthrough pain  oxyCODONE    5 mG/acetaminophen 325 mG 2 Tablet(s) Oral every 6 hours PRN Moderate Pain (4 - 6)    I&O's Detail    25 Apr 2019 07:01  -  26 Apr 2019 07:00  --------------------------------------------------------  IN:    IV PiggyBack: 50 mL  Total IN: 50 mL    OUT:    Drain: 2 mL    Drain: 5 mL  Total OUT: 7 mL    Total NET: 43 mL    Vital Signs Last 24 Hrs  T(C): 36.1 (26 Apr 2019 14:14), Max: 37.1 (25 Apr 2019 16:31)  T(F): 97 (26 Apr 2019 14:14), Max: 98.8 (25 Apr 2019 16:31)  HR: 78 (26 Apr 2019 14:29) (71 - 88)  BP: 107/29 (26 Apr 2019 14:29) (76/54 - 168/45)  BP(mean): 112 (26 Apr 2019 09:00) (40 - 112)  RR: 91 (26 Apr 2019 14:29) (16 - 91)  SpO2: 99% (26 Apr 2019 14:29) (90% - 100%)LABS:    PHYSICAL EXAM:  Alert and comfortable  GENERAL: No distress  CHEST/LUNG: fair air entry  HEART: S1S2 RRR  ABDOMEN: soft  EXTREMITIES: no edema  SKIN:        04-26    135  |  101  |  33<H>  ----------------------------<  99  4.0   |  26  |  4.23<H>    Ca    7.9<L>      26 Apr 2019 06:00  Phos  3.1     04-26    TPro  x   /  Alb  1.8<L>  /  TBili  x   /  DBili  x   /  AST  x   /  ALT  x   /  AlkPhos  x   04-26                            9.7    4.30  )-----------( 134      ( 26 Apr 2019 06:00 )             31.3

## 2019-04-26 NOTE — CONSULT NOTE ADULT - CONSULT REQUESTED BY NAME
Patient and significant other walked 1 lap around neuroscience department. Tolerated well. Patient also dressed self, toileted self, and brushed his own teeth.   hospitalist

## 2019-04-26 NOTE — PROGRESS NOTE ADULT - SUBJECTIVE AND OBJECTIVE BOX
CHIEF COMPLAINT:    SUBJECTIVE:   85 y/o male with ESRD on HD (MWF), HTN, RA on MTX and steroids, R leg amputation, severe CDI and megacolon, Severe PAD s/p LLE endarterectomy (4/1/19)with dr Clement presents with T 100.5 and DC from L groin.  Pt also c/o R arm pain/swelling where pt had fistulogram of the AC fistula on last admission.    4/26 Pt seen and examined at bedside with no complaints.  REVIEW OF SYSTEMS:  CONSTITUTIONAL: No weakness, fevers or chills  EYES/ENT: No visual changes;  No vertigo or throat pain   NECK: No pain or stiffness  RESPIRATORY: No cough, wheezing, hemoptysis; No shortness of breath  CARDIOVASCULAR: No chest pain or palpitations  GASTROINTESTINAL: No abdominal or epigastric pain. No nausea, vomiting, or hematemesis; No diarrhea or constipation. No melena or hematochezia.  GENITOURINARY: No dysuria, frequency or hematuria  NEUROLOGICAL: No numbness or weakness  SKIN: No itching, burning, rashes, or lesions   All other review of systems is negative unless indicated above    Vital Signs Last 24 Hrs  T(C): 36.1 (26 Apr 2019 14:14), Max: 37.1 (25 Apr 2019 16:31)  T(F): 97 (26 Apr 2019 14:14), Max: 98.8 (25 Apr 2019 16:31)  HR: 78 (26 Apr 2019 14:29) (71 - 88)  BP: 107/29 (26 Apr 2019 14:29) (76/54 - 168/45)  BP(mean): 112 (26 Apr 2019 09:00) (40 - 112)  RR: 91 (26 Apr 2019 14:29) (16 - 91)  SpO2: 99% (26 Apr 2019 14:29) (90% - 100%)    I&O's Summary    25 Apr 2019 07:01  -  26 Apr 2019 07:00  --------------------------------------------------------  IN: 50 mL / OUT: 7 mL / NET: 43 mL        CAPILLARY BLOOD GLUCOSE          PHYSICAL EXAM:  Constitutional: NAD, awake and alert, well-developed  HEENT: PERR, EOMI, Normal Hearing, MMM  Neck: Soft and supple, No LAD  Respiratory: Breath sounds are clear bilaterally, No wheezing, rales or rhonchi  Cardiovascular: S1 and S2, irregular rate and rhythm, no Murmurs, gallops or rubs  Gastrointestinal: Bowel Sounds present, soft, nontender, nondistended, no guarding, no rebound  Extremities: No peripheral edema. R BKA, Left surgical site has staples and is clean dry and intact. No drainage or erythema  Vascular: 2+ peripheral pulses  Neurological: A/O x 3, no focal deficits  Skin: No rashes    MEDICATIONS:  MEDICATIONS  (STANDING):  allopurinol 100 milliGRAM(s) Oral <User Schedule>  aspirin  chewable 81 milliGRAM(s) Oral daily  atorvastatin 20 milliGRAM(s) Oral at bedtime  cefepime  Injectable. 1000 milliGRAM(s) IV Push daily  cholestyramine Powder (Sugar-Free) 4 Gram(s) Oral daily  DAPTOmycin IVPB      diltiazem    Tablet 60 milliGRAM(s) Oral four times a day  doxercalciferol Injectable 1 MICROGram(s) IV Push <User Schedule>  epoetin nelly Injectable 87995 Unit(s) IV Push <User Schedule>  finasteride 5 milliGRAM(s) Oral daily  lidocaine/prilocaine Cream 1 Application(s) Topical daily  pantoprazole    Tablet 40 milliGRAM(s) Oral before breakfast  predniSONE   Tablet 2.5 milliGRAM(s) Oral every other day  predniSONE   Tablet 5 milliGRAM(s) Oral every other day  sevelamer hydrochloride Oral Tab/Cap - Peds 800 milliGRAM(s) Oral three times a day with meals  silver sulfADIAZINE 1% Cream 1 Application(s) Topical daily  sodium chloride 0.9% lock flush 3 milliLiter(s) IV Push every 8 hours  vancomycin    Solution 125 milliGRAM(s) Oral every 6 hours  warfarin 3 milliGRAM(s) Oral once      LABS: All Labs Reviewed:                        9.7    4.30  )-----------( 134      ( 26 Apr 2019 06:00 )             31.3     04-26    135  |  101  |  33<H>  ----------------------------<  99  4.0   |  26  |  4.23<H>    Ca    7.9<L>      26 Apr 2019 06:00  Phos  3.1     04-26    TPro  x   /  Alb  1.8<L>  /  TBili  x   /  DBili  x   /  AST  x   /  ALT  x   /  AlkPhos  x   04-26    PT/INR - ( 26 Apr 2019 06:00 )   PT: 18.8 sec;   INR: 1.67 ratio         PTT - ( 25 Apr 2019 06:44 )  PTT:28.6 sec  CARDIAC MARKERS ( 25 Apr 2019 06:44 )  x     / x     / 68 U/L / x     / x          Blood Culture: 04-23 @ 15:08  Organism Enterococcus faecium (vancomycin resistant)  Gram Stain Blood -- Gram Stain --  Specimen Source .Surgical Swab left groin deep wound culture  Culture-Blood --        RADIOLOGY/EKG:  < from: US Duplex Arterial Lower Ext Ltd, Left (04.19.19 @ 12:13) >  Impression:    Focal ultrasound of the left groin demonstrates no evidence of   pseudoaneurysm.    Hematoma anterior to the left common femoral artery measuring   approximately 3.2 x 0.9 x 5.0 cm.    There is plaque within the proximal left femoral artery, with an elevated   velocity of 110 cm/s.        < from: US Duplex Venous Upper Ext Ltd, Right (04.19.19 @ 12:16) >  IMPRESSION:     No evidence of right upper extremity deep venous thrombosis.  Markedly increased velocities within the patient's arteriovenous fistula   raising suspicious for hemodynamically significant stenosis.  DVT PPX:  coumadin  ADVANCED DIRECTIVE:    DISPOSITION: Likely discharge on Monday. Will have PT evaluate patient

## 2019-04-26 NOTE — PROGRESS NOTE ADULT - ASSESSMENT
85 y/o male with ESRD on HD (MWF), HTN, RA on MTX and steroids, R leg amputation, severe CDI and megacolon, Severe PAD s/p LLE endarterectomy 2 weeks ago with dr dugan presents with T 100 and DC from L groin.  Pt also c/o R arm pain/swelling where pt had fistulogram of the AC fistula on last admission.  RUL dopplers negative for DVT.  L groin sono--negative for pseudo aneurysm and + hematoma, eval [facility] vascular surgery noted to have seropurulent drainage from L groin along medial flap and erythema along wound edges concerning for superimposed infection.  Pt given 2.1 L IVF, IV vanco/aztreonam in ED.     1. L groin wound infection/hematoma. s/p LLE endarterectomy. ESRD. PCN allergy  - slowly improving   - s/p debridement/muscle flap closure 4/23 deep wound cx w VRE  -, on IV daptomycin #2 cpk wnl check weekly on therapy  - on cefepime 1gm daily #4  - continue with abx coverage  - s/p aztreonam  #4, s/p vancomycin #6  - on c diff prophylaxis with oral vancomycin  - blood cx no growth  - monitor temps  - tolerating abx well so far; no side effects noted  - reason for abx use and side effects reviewed with patient  - supportive care  - f/u cbc    2. other issues - care per medicine

## 2019-04-26 NOTE — PHYSICAL THERAPY INITIAL EVALUATION ADULT - PLANNED THERAPY INTERVENTIONS, PT EVAL
250
strengthening
wheelchair management/propulsion training/gait training/stump shaping/shrinking,desensitization,pre-prosthetic training/transfer training/bed mobility training/strengthening/balance training

## 2019-04-26 NOTE — PROGRESS NOTE ADULT - SUBJECTIVE AND OBJECTIVE BOX
Pt has been seen and examined with FP resident, resident supervised agree with a/p       Patient is a 84y old  Male who presents with a chief complaint of L groin DC (24 Apr 2019 11:51)      PHYSICAL EXAM:    general- comfortable   -rs-aeeb,cta  -cvs-s1s2 normal   -p/a-soft,bs+          A/P    #Ct abx, supportive care, Discussed with Dr. Flanagan    #DVT pr

## 2019-04-27 DIAGNOSIS — R79.1 ABNORMAL COAGULATION PROFILE: ICD-10-CM

## 2019-04-27 LAB
-  AMPICILLIN/SULBACTAM: SIGNIFICANT CHANGE UP
-  AMPICILLIN/SULBACTAM: SIGNIFICANT CHANGE UP
-  CEFAZOLIN: SIGNIFICANT CHANGE UP
-  CEFAZOLIN: SIGNIFICANT CHANGE UP
-  CLINDAMYCIN: SIGNIFICANT CHANGE UP
-  CLINDAMYCIN: SIGNIFICANT CHANGE UP
-  ERYTHROMYCIN: SIGNIFICANT CHANGE UP
-  ERYTHROMYCIN: SIGNIFICANT CHANGE UP
-  GENTAMICIN: SIGNIFICANT CHANGE UP
-  GENTAMICIN: SIGNIFICANT CHANGE UP
-  OXACILLIN: SIGNIFICANT CHANGE UP
-  OXACILLIN: SIGNIFICANT CHANGE UP
-  PENICILLIN: SIGNIFICANT CHANGE UP
-  PENICILLIN: SIGNIFICANT CHANGE UP
-  RIFAMPIN: SIGNIFICANT CHANGE UP
-  RIFAMPIN: SIGNIFICANT CHANGE UP
-  TETRACYCLINE: SIGNIFICANT CHANGE UP
-  TETRACYCLINE: SIGNIFICANT CHANGE UP
-  TRIMETHOPRIM/SULFAMETHOXAZOLE: SIGNIFICANT CHANGE UP
-  TRIMETHOPRIM/SULFAMETHOXAZOLE: SIGNIFICANT CHANGE UP
-  VANCOMYCIN: SIGNIFICANT CHANGE UP
-  VANCOMYCIN: SIGNIFICANT CHANGE UP
ANION GAP SERPL CALC-SCNC: 8 MMOL/L — SIGNIFICANT CHANGE UP (ref 5–17)
BUN SERPL-MCNC: 18 MG/DL — SIGNIFICANT CHANGE UP (ref 7–23)
CALCIUM SERPL-MCNC: 8.3 MG/DL — LOW (ref 8.5–10.1)
CHLORIDE SERPL-SCNC: 106 MMOL/L — SIGNIFICANT CHANGE UP (ref 96–108)
CO2 SERPL-SCNC: 25 MMOL/L — SIGNIFICANT CHANGE UP (ref 22–31)
CREAT SERPL-MCNC: 2.77 MG/DL — HIGH (ref 0.5–1.3)
GLUCOSE SERPL-MCNC: 86 MG/DL — SIGNIFICANT CHANGE UP (ref 70–99)
HCT VFR BLD CALC: 33.3 % — LOW (ref 39–50)
HGB BLD-MCNC: 10.3 G/DL — LOW (ref 13–17)
INR BLD: 3.32 RATIO — HIGH (ref 0.88–1.16)
MCHC RBC-ENTMCNC: 30.9 GM/DL — LOW (ref 32–36)
MCHC RBC-ENTMCNC: 31.4 PG — SIGNIFICANT CHANGE UP (ref 27–34)
MCV RBC AUTO: 101.5 FL — HIGH (ref 80–100)
METHOD TYPE: SIGNIFICANT CHANGE UP
METHOD TYPE: SIGNIFICANT CHANGE UP
NRBC # BLD: 0 /100 WBCS — SIGNIFICANT CHANGE UP (ref 0–0)
PLATELET # BLD AUTO: 229 K/UL — SIGNIFICANT CHANGE UP (ref 150–400)
POTASSIUM SERPL-MCNC: 4.4 MMOL/L — SIGNIFICANT CHANGE UP (ref 3.5–5.3)
POTASSIUM SERPL-SCNC: 4.4 MMOL/L — SIGNIFICANT CHANGE UP (ref 3.5–5.3)
PROTHROM AB SERPL-ACNC: 38.2 SEC — HIGH (ref 10–12.9)
RBC # BLD: 3.28 M/UL — LOW (ref 4.2–5.8)
RBC # FLD: 20.9 % — HIGH (ref 10.3–14.5)
SODIUM SERPL-SCNC: 139 MMOL/L — SIGNIFICANT CHANGE UP (ref 135–145)
WBC # BLD: 5.32 K/UL — SIGNIFICANT CHANGE UP (ref 3.8–10.5)
WBC # FLD AUTO: 5.32 K/UL — SIGNIFICANT CHANGE UP (ref 3.8–10.5)

## 2019-04-27 PROCEDURE — 71045 X-RAY EXAM CHEST 1 VIEW: CPT | Mod: 26

## 2019-04-27 RX ADMIN — HYDROMORPHONE HYDROCHLORIDE 0.5 MILLIGRAM(S): 2 INJECTION INTRAMUSCULAR; INTRAVENOUS; SUBCUTANEOUS at 22:56

## 2019-04-27 RX ADMIN — Medication 125 MILLIGRAM(S): at 17:41

## 2019-04-27 RX ADMIN — ATORVASTATIN CALCIUM 20 MILLIGRAM(S): 80 TABLET, FILM COATED ORAL at 22:26

## 2019-04-27 RX ADMIN — Medication 60 MILLIGRAM(S): at 05:24

## 2019-04-27 RX ADMIN — FINASTERIDE 5 MILLIGRAM(S): 5 TABLET, FILM COATED ORAL at 13:29

## 2019-04-27 RX ADMIN — SEVELAMER CARBONATE 800 MILLIGRAM(S): 2400 POWDER, FOR SUSPENSION ORAL at 17:40

## 2019-04-27 RX ADMIN — CHOLESTYRAMINE 4 GRAM(S): 4 POWDER, FOR SUSPENSION ORAL at 09:17

## 2019-04-27 RX ADMIN — SODIUM CHLORIDE 3 MILLILITER(S): 9 INJECTION INTRAMUSCULAR; INTRAVENOUS; SUBCUTANEOUS at 07:42

## 2019-04-27 RX ADMIN — SODIUM CHLORIDE 3 MILLILITER(S): 9 INJECTION INTRAMUSCULAR; INTRAVENOUS; SUBCUTANEOUS at 13:36

## 2019-04-27 RX ADMIN — Medication 100 MILLIGRAM(S): at 10:49

## 2019-04-27 RX ADMIN — PANTOPRAZOLE SODIUM 40 MILLIGRAM(S): 20 TABLET, DELAYED RELEASE ORAL at 05:24

## 2019-04-27 RX ADMIN — Medication 2.5 MILLIGRAM(S): at 13:31

## 2019-04-27 RX ADMIN — DAPTOMYCIN 111.6 MILLIGRAM(S): 500 INJECTION, POWDER, LYOPHILIZED, FOR SOLUTION INTRAVENOUS at 12:47

## 2019-04-27 RX ADMIN — Medication 125 MILLIGRAM(S): at 23:43

## 2019-04-27 RX ADMIN — CEFEPIME 1000 MILLIGRAM(S): 1 INJECTION, POWDER, FOR SOLUTION INTRAMUSCULAR; INTRAVENOUS at 13:30

## 2019-04-27 RX ADMIN — Medication 125 MILLIGRAM(S): at 05:24

## 2019-04-27 RX ADMIN — HYDROMORPHONE HYDROCHLORIDE 0.5 MILLIGRAM(S): 2 INJECTION INTRAMUSCULAR; INTRAVENOUS; SUBCUTANEOUS at 22:26

## 2019-04-27 RX ADMIN — SEVELAMER CARBONATE 800 MILLIGRAM(S): 2400 POWDER, FOR SUSPENSION ORAL at 09:18

## 2019-04-27 RX ADMIN — Medication 60 MILLIGRAM(S): at 17:40

## 2019-04-27 RX ADMIN — SODIUM CHLORIDE 3 MILLILITER(S): 9 INJECTION INTRAMUSCULAR; INTRAVENOUS; SUBCUTANEOUS at 21:56

## 2019-04-27 RX ADMIN — Medication 1 APPLICATION(S): at 13:34

## 2019-04-27 RX ADMIN — Medication 125 MILLIGRAM(S): at 13:31

## 2019-04-27 RX ADMIN — Medication 81 MILLIGRAM(S): at 13:34

## 2019-04-27 RX ADMIN — HYDROMORPHONE HYDROCHLORIDE 0.5 MILLIGRAM(S): 2 INJECTION INTRAMUSCULAR; INTRAVENOUS; SUBCUTANEOUS at 13:00

## 2019-04-27 RX ADMIN — HYDROMORPHONE HYDROCHLORIDE 0.5 MILLIGRAM(S): 2 INJECTION INTRAMUSCULAR; INTRAVENOUS; SUBCUTANEOUS at 12:44

## 2019-04-27 RX ADMIN — Medication 60 MILLIGRAM(S): at 23:43

## 2019-04-27 NOTE — PROGRESS NOTE ADULT - PROBLEM SELECTOR PLAN 6
Rate controlled  coumadin held tonight- trend INR  Cardizem 60mg po q6h Rate controlled  coumadin held tonight for supratherapeutic INR- trend INR  Cardizem 60mg po q6h

## 2019-04-27 NOTE — PROGRESS NOTE ADULT - SUBJECTIVE AND OBJECTIVE BOX
NEPHROLOGY INTERVAL HPI/OVERNIGHT EVENTS:    Date of Service: 04-27-19 @ 09:26  4/27 SY  Resting comfortably  No new complaints.  Reports much improved pain in Left groin and foot.    4/26  stable groin flap  in SSD  seen on hd  in good spirits   stable on hd  fluid removal reduced due to low BP  hgb stable    4/25 SY  Feeling fair.  Continues with pain in Left groin and foot.  Pain control tolerable.    4/24 SY  Post Muscle Flap coverage of Left groin wound.  Reports pain in left foot.  Ate breakfast well.  No SOB    4/23  feels much better today  arm less swollen  for OR later washout groin wound  HD in am    4/22 SY  Alert.  No acute distress.  Complains of soreness in Left foot.  Continued pain in Left Groin area.  Right Arm and Elbow pain improved.    HPI:  85 y/o male with ESRD on HD (MWF), HTN, RA on MTX and steroids, R leg amputation, severe CDI and megacolon, Severe PAD s/p LLE endarterectomy 2 weeks ago with dr dugan presents with T 100 and DC from L groin.  Pt also c/o R arm pain/swelling where pt had fistulogram of the AC fistula on last admission.  RUL dopplers negative for DVT.  L groin sono--negative for pseudo aneurysm and + hematoma.  Pt given 2.1 L IVF, IV vanco/aztreonam in ED  On my exam in HD suite, awake, chronically ill appearing, NAD, daughter at bedside. Pt seen by Dr dugan in ED.  CXR--clear    TTE (10/18)--mild-mod MR/EF 60%    Pt d/w daughter regarding advance directives--Pt is FULL RESUSCITATION (19 Apr 2019 16:12)      MEDICATIONS  (STANDING):  allopurinol 100 milliGRAM(s) Oral <User Schedule>  aspirin  chewable 81 milliGRAM(s) Oral daily  atorvastatin 20 milliGRAM(s) Oral at bedtime  cefepime  Injectable. 1000 milliGRAM(s) IV Push daily  cholestyramine Powder (Sugar-Free) 4 Gram(s) Oral daily  DAPTOmycin IVPB      DAPTOmycin IVPB 290 milliGRAM(s) IV Intermittent every 48 hours  diltiazem    Tablet 60 milliGRAM(s) Oral four times a day  doxercalciferol Injectable 1 MICROGram(s) IV Push <User Schedule>  epoetin nelly Injectable 62008 Unit(s) IV Push <User Schedule>  finasteride 5 milliGRAM(s) Oral daily  lidocaine/prilocaine Cream 1 Application(s) Topical daily  pantoprazole    Tablet 40 milliGRAM(s) Oral before breakfast  predniSONE   Tablet 2.5 milliGRAM(s) Oral every other day  predniSONE   Tablet 5 milliGRAM(s) Oral every other day  sevelamer hydrochloride Oral Tab/Cap - Peds 800 milliGRAM(s) Oral three times a day with meals  silver sulfADIAZINE 1% Cream 1 Application(s) Topical daily  sodium chloride 0.9% lock flush 3 milliLiter(s) IV Push every 8 hours  vancomycin    Solution 125 milliGRAM(s) Oral every 6 hours    MEDICATIONS  (PRN):  acetaminophen   Tablet .. 650 milliGRAM(s) Oral every 6 hours PRN Temp greater or equal to 38.5C (101.3F)  acetaminophen   Tablet .. 650 milliGRAM(s) Oral every 6 hours PRN Mild Pain (1 - 3)  HYDROmorphone  Injectable 0.5 milliGRAM(s) IV Push every 4 hours PRN breakthrough pain  oxyCODONE    5 mG/acetaminophen 325 mG 2 Tablet(s) Oral every 6 hours PRN Moderate Pain (4 - 6)    Vital Signs Last 24 Hrs  T(C): 36.2 (27 Apr 2019 06:00), Max: 37.1 (26 Apr 2019 20:41)  T(F): 97.1 (27 Apr 2019 06:00), Max: 98.7 (26 Apr 2019 20:41)  HR: 83 (27 Apr 2019 06:00) (65 - 88)  BP: 144/20 (27 Apr 2019 06:00) (76/54 - 159/41)  BP(mean): 47 (27 Apr 2019 06:00) (41 - 70)  RR: 18 (27 Apr 2019 06:00) (15 - 91)  SpO2: 97% (27 Apr 2019 06:00) (94% - 100%)    PHYSICAL EXAM:  Alert and comfortable  GENERAL: No distress  CHEST/LUNG: Clear to aus  HEART: S1S2 RRR  ABDOMEN: soft  EXTREMITIES: no edema  SKIN:     LABS:                        10.3   5.32  )-----------( 229      ( 27 Apr 2019 06:38 )             33.3     04-27    139  |  106  |  18  ----------------------------<  86  4.4   |  25  |  2.77<H>    Ca    8.3<L>      27 Apr 2019 06:38  Phos  3.1     04-26    TPro  x   /  Alb  1.8<L>  /  TBili  x   /  DBili  x   /  AST  x   /  ALT  x   /  AlkPhos  x   04-26    PT/INR - ( 27 Apr 2019 06:38 )   PT: 38.2 sec;   INR: 3.32 ratio                     RADIOLOGY & ADDITIONAL TESTS:

## 2019-04-27 NOTE — PHYSICAL THERAPY INITIAL EVALUATION ADULT - PERTINENT HX OF CURRENT PROBLEM, REHAB EVAL
Pt. is an 83 y/o male with ESRD on HD (MWF), HTN, RA on MTX and steroids, R leg amputation, severe CDI and megacolon, Severe PAD s/p LLE endarterectomy 2 weeks ago with dr dugan presents with T 100 and DC from L groin.  Pt also c/o R arm pain/swelling where pt had fistulogram of the AC fistula on last admission.  RUL dopplers negative for DVT.  Pt. with s/p bovine pericardial patch intact; minimally necrotic SQ tissue and skin, s/p LLE endarterectomy Pt. is an 83 y/o male with ESRD on HD (MWF), HTN, RA on MTX and steroids, R leg amputation, severe CDI and megacolon, Severe PAD s/p LLE endarterectomy 2 weeks ago with dr dugan presents with T 100 and DC from L groin.  Pt also c/o R arm pain/swelling where pt had fistulogram of the AC fistula on last admission.  RUE dopplers negative for DVT + AVF.  Pt. with s/p bovine pericardial patch intact; minimally necrotic SQ tissue and skin, s/p LLE endarterectomy

## 2019-04-27 NOTE — PROGRESS NOTE ADULT - ASSESSMENT
83 y/o male with ESRD on HD (MWF), HTN, RA on MTX and steroids, R leg amputation, severe CDI and megacolon, Severe PAD s/p LLE endarterectomy (4/1/19) with dr dugan presents with T 100 and drainage from L groin. 85 y/o male with ESRD on HD (MWF), HTN, RA on MTX and steroids, R leg amputation, severe CDI and megacolon, Severe PAD s/p LLE endarterectomy (4/1/19) with dr dugan presents with T 100 and drainage from L groin. dopplers of the LLE showed hematoma anterior to the left common femoral artery measuring approximately 3.2 x 0.9 x 5.0 cm s/p debridement of L groin hematoma and muscle flap on 4/23.

## 2019-04-27 NOTE — PHYSICAL THERAPY INITIAL EVALUATION ADULT - CRITERIA FOR SKILLED THERAPEUTIC INTERVENTIONS
anticipated equipment needs at discharge/impairments found/anticipated discharge recommendation/functional limitations in following categories/risk reduction/prevention/rehab potential/therapy frequency/predicted duration of therapy intervention

## 2019-04-27 NOTE — PROGRESS NOTE ADULT - SUBJECTIVE AND OBJECTIVE BOX
Pt has been seen and examined with FP resident, resident supervised agree with a/p       Patient is a 84y old  Male who presents with a chief complaint of L groin DC (24 Apr 2019 11:51)      PHYSICAL EXAM:    general- comfortable   -rs-aeeb,cta  -cvs-s1s2 normal   -p/a-soft,bs+          A/P    #Ct abx, supportive care, Discussed with Dr. Zamora      #DVT pr

## 2019-04-27 NOTE — PHYSICAL THERAPY INITIAL EVALUATION ADULT - GENERAL OBSERVATIONS, REHAB EVAL
Pt. received supine in bed at SD unit with all cardiac monitor. Pt. agreeable to PT after third attempt. Pt. is R LE amp and surgical staples to L groin area.

## 2019-04-27 NOTE — PHYSICAL THERAPY INITIAL EVALUATION ADULT - ADDITIONAL COMMENTS
Pt. lives at [facility] and was able to transfer from bed to W/C with sliding board and was getting assistance for ADL's

## 2019-04-27 NOTE — PROGRESS NOTE ADULT - PROBLEM SELECTOR PLAN 1
Pt who had an endarterectomy April 1st who developed an infection at entry site and fever of 100.5  -now afebrile  -dopplers of the LLE showed hematoma anterior to the left common femoral artery measuring approximately 3.2 x 0.9 x 5.0 cm  -Dr. Clement recommendation appreciated:Debridement done. States will monitor for a few more days  Pain control w/ dilaudid  s/p aztreonam  -continue cefepime Day #5, Vancomycin for c diff prophylaxis Day #9, Daptomycin added Day #3  -wound care  -wound cx growing VRE  -ID consult appreciated Pt who had an endarterectomy April 1st who developed an infection at entry site and fever of 100.5  -now afebrile  -Dr. Clement recommendation appreciated  Pain control w/ dilaudid  s/p aztreonam  -continue cefepime Day #5, Vancomycin for c diff prophylaxis Day #9, Daptomycin added Day #2 (weekly CPKs)  -wound care  -wound cx growing VRE  -ID consult appreciated

## 2019-04-27 NOTE — PROGRESS NOTE ADULT - SUBJECTIVE AND OBJECTIVE BOX
HPI:  85 y/o male with ESRD on HD (MWF), HTN, RA on MTX and steroids, R leg amputation, severe CDI and megacolon, Severe PAD s/p LLE endarterectomy 2 weeks ago with dr dugan presents with T 100 and DC from L groin.  Pt also c/o R arm pain/swelling where pt had fistulogram of the AC fistula on last admission.  RUL dopplers negative for DVT.  L groin sono--negative for pseudo aneurysm and + hematoma.  Pt given 2.1 L IVF, IV vanco/aztreonam in ED  On my exam in HD suite, awake, chronically ill appearing, NAD, daughter at bedside. Pt seen by Dr dugan in ED.  CXR--clear    TTE (10/18)--mild-mod MR/EF 60%    Pt d/w daughter regarding advance directives--Pt is FULL RESUSCITATION (19 Apr 2019 16:12)      CHIEF COMPLAINT:    SUBJECTIVE:     REVIEW OF SYSTEMS:  CONSTITUTIONAL: No weakness, fevers or chills  EYES/ENT: No visual changes;  No vertigo or throat pain   NECK: No pain or stiffness  RESPIRATORY: No cough, wheezing, hemoptysis; No shortness of breath  CARDIOVASCULAR: No chest pain or palpitations  GASTROINTESTINAL: No abdominal or epigastric pain. No nausea, vomiting, or hematemesis; No diarrhea or constipation. No melena or hematochezia.  GENITOURINARY: No dysuria, frequency or hematuria  NEUROLOGICAL: No numbness or weakness  SKIN: No itching, burning, rashes, or lesions   All other review of systems is negative unless indicated above    Vital Signs Last 24 Hrs  T(C): 36.7 (27 Apr 2019 16:40), Max: 37.1 (26 Apr 2019 20:41)  T(F): 98 (27 Apr 2019 16:40), Max: 98.7 (26 Apr 2019 20:41)  HR: 75 (27 Apr 2019 15:01) (65 - 84)  BP: 133/24 (27 Apr 2019 15:01) (67/47 - 159/41)  BP(mean): 50 (27 Apr 2019 15:01) (41 - 97)  RR: 20 (27 Apr 2019 15:01) (15 - 23)  SpO2: 95% (27 Apr 2019 15:01) (91% - 99%)    I&O's Summary      CAPILLARY BLOOD GLUCOSE          PHYSICAL EXAM:  Constitutional: NAD, awake and alert, well-developed  HEENT: PERR, EOMI, Normal Hearing, MMM  Neck: Soft and supple, No LAD, No JVD  Respiratory: Breath sounds are clear bilaterally, No wheezing, rales or rhonchi  Cardiovascular: S1 and S2, regular rate and rhythm, no Murmurs, gallops or rubs  Gastrointestinal: Bowel Sounds present, soft, nontender, nondistended, no guarding, no rebound  Extremities: No peripheral edema  Vascular: 2+ peripheral pulses  Neurological: A/O x 3, no focal deficits  Musculoskeletal: 5/5 strength b/l upper and lower extremities  Skin: No rashes    MEDICATIONS:  MEDICATIONS  (STANDING):  allopurinol 100 milliGRAM(s) Oral <User Schedule>  aspirin  chewable 81 milliGRAM(s) Oral daily  atorvastatin 20 milliGRAM(s) Oral at bedtime  cefepime  Injectable. 1000 milliGRAM(s) IV Push daily  cholestyramine Powder (Sugar-Free) 4 Gram(s) Oral daily  DAPTOmycin IVPB      DAPTOmycin IVPB 290 milliGRAM(s) IV Intermittent every 48 hours  diltiazem    Tablet 60 milliGRAM(s) Oral four times a day  doxercalciferol Injectable 1 MICROGram(s) IV Push <User Schedule>  epoetin nelly Injectable 50645 Unit(s) IV Push <User Schedule>  finasteride 5 milliGRAM(s) Oral daily  lidocaine/prilocaine Cream 1 Application(s) Topical daily  pantoprazole    Tablet 40 milliGRAM(s) Oral before breakfast  predniSONE   Tablet 2.5 milliGRAM(s) Oral every other day  predniSONE   Tablet 5 milliGRAM(s) Oral every other day  sevelamer hydrochloride Oral Tab/Cap - Peds 800 milliGRAM(s) Oral three times a day with meals  silver sulfADIAZINE 1% Cream 1 Application(s) Topical daily  sodium chloride 0.9% lock flush 3 milliLiter(s) IV Push every 8 hours  vancomycin    Solution 125 milliGRAM(s) Oral every 6 hours      LABS: All Labs Reviewed:                        10.3   5.32  )-----------( 229      ( 27 Apr 2019 06:38 )             33.3     04-27    139  |  106  |  18  ----------------------------<  86  4.4   |  25  |  2.77<H>    Ca    8.3<L>      27 Apr 2019 06:38  Phos  3.1     04-26    TPro  x   /  Alb  1.8<L>  /  TBili  x   /  DBili  x   /  AST  x   /  ALT  x   /  AlkPhos  x   04-26    PT/INR - ( 27 Apr 2019 06:38 )   PT: 38.2 sec;   INR: 3.32 ratio               Blood Culture: 04-23 @ 15:08  Organism Enterococcus faecium (vancomycin resistant)  Gram Stain Blood -- Gram Stain --  Specimen Source .Surgical Swab left groin deep wound culture  Culture-Blood --        RADIOLOGY/EKG:    DVT PPX:    ADVANCED DIRECTIVE:    DISPOSITION: HPI:  83 y/o male with ESRD on HD (MWF), HTN, RA on MTX and steroids, R leg amputation, severe CDI and megacolon, Severe PAD s/p LLE endarterectomy 2 weeks ago with dr dugan presents with T 100 and DC from L groin.  Pt also c/o R arm pain/swelling where pt had fistulogram of the AC fistula on last admission.  RUL dopplers negative for DVT.  L groin sono--negative for pseudo aneurysm and + hematoma.  Pt given 2.1 L IVF, IV vanco/aztreonam in ED  TTE (10/18)--mild-mod MR/EF 60%  Pt d/w daughter regarding advance directives--Pt is FULL RESUSCITATION (19 Apr 2019 16:12)      SUBJECTIVE: Pt seen and examined at bedside. Pt states dark blood tinged sputum x1 this a.m. No fevers/chills, headaches, no cough. No melana/hematochezia. No CP/palpitations/SOB.    REVIEW OF SYSTEMS:  CONSTITUTIONAL: No fevers or chills  EYES/ENT: No throat pain   RESPIRATORY: No cough, No shortness of breath  CARDIOVASCULAR: No chest pain or palpitations  GASTROINTESTINAL: No abdominal or epigastric pain. No melena or hematochezia.  GENITOURINARY: No hematuria  SKIN: wound on lateral LLE  All other review of systems is negative unless indicated above    Vital Signs Last 24 Hrs  T(C): 36.7 (27 Apr 2019 16:40), Max: 37.1 (26 Apr 2019 20:41)  T(F): 98 (27 Apr 2019 16:40), Max: 98.7 (26 Apr 2019 20:41)  HR: 75 (27 Apr 2019 15:01) (65 - 84)  BP: 133/24 (27 Apr 2019 15:01) (67/47 - 159/41)  BP(mean): 50 (27 Apr 2019 15:01) (41 - 97)  RR: 20 (27 Apr 2019 15:01) (15 - 23)  SpO2: 95% (27 Apr 2019 15:01) (91% - 99%)    PHYSICAL EXAM:  Constitutional: NAD, awake and alert, well-developed  HEENT: PERR, EOMI, Normal Hearing, MMM  Neck: Soft and supple, No LAD, No JVD  Respiratory: Breath sounds are clear bilaterally, No wheezing, rales or rhonchi  Cardiovascular: S1 and S2, regular rate and rhythm, no Murmurs, gallops or rubs  Gastrointestinal: Bowel Sounds present, soft, nontender, nondistended, no guarding, no rebound  Extremities: No peripheral edema. R BKA, Left surgical site is clean dry and intact. No drainage or erythema; L leg has ~1m1ysya wound ~4inches above Lateral malleolus, +erythema, no pus or swelling.  Neurological: A/O x 3  Musculoskeletal: 5/5 strength b/l upper and lower extremities    MEDICATIONS:  MEDICATIONS  (STANDING):  allopurinol 100 milliGRAM(s) Oral <User Schedule>  aspirin  chewable 81 milliGRAM(s) Oral daily  atorvastatin 20 milliGRAM(s) Oral at bedtime  cefepime  Injectable. 1000 milliGRAM(s) IV Push daily  cholestyramine Powder (Sugar-Free) 4 Gram(s) Oral daily  DAPTOmycin IVPB      DAPTOmycin IVPB 290 milliGRAM(s) IV Intermittent every 48 hours  diltiazem    Tablet 60 milliGRAM(s) Oral four times a day  doxercalciferol Injectable 1 MICROGram(s) IV Push <User Schedule>  epoetin nelly Injectable 64551 Unit(s) IV Push <User Schedule>  finasteride 5 milliGRAM(s) Oral daily  lidocaine/prilocaine Cream 1 Application(s) Topical daily  pantoprazole    Tablet 40 milliGRAM(s) Oral before breakfast  predniSONE   Tablet 2.5 milliGRAM(s) Oral every other day  predniSONE   Tablet 5 milliGRAM(s) Oral every other day  sevelamer hydrochloride Oral Tab/Cap - Peds 800 milliGRAM(s) Oral three times a day with meals  silver sulfADIAZINE 1% Cream 1 Application(s) Topical daily  sodium chloride 0.9% lock flush 3 milliLiter(s) IV Push every 8 hours  vancomycin    Solution 125 milliGRAM(s) Oral every 6 hours      LABS: All Labs Reviewed:                        10.3   5.32  )-----------( 229      ( 27 Apr 2019 06:38 )             33.3     04-27    139  |  106  |  18  ----------------------------<  86  4.4   |  25  |  2.77<H>    Ca    8.3<L>      27 Apr 2019 06:38  Phos  3.1     04-26    TPro  x   /  Alb  1.8<L>  /  TBili  x   /  DBili  x   /  AST  x   /  ALT  x   /  AlkPhos  x   04-26    PT/INR - ( 27 Apr 2019 06:38 )   PT: 38.2 sec;   INR: 3.32 ratio         Blood Culture: 04-23 @ 15:08  Organism Enterococcus faecium (vancomycin resistant)  Gram Stain Blood -- Gram Stain --  Specimen Source .Surgical Swab left groin deep wound culture  Culture-Blood --    RADIOLOGY/EKG:    DVT PPX:    ADVANCED DIRECTIVE:    DISPOSITION:

## 2019-04-27 NOTE — PROGRESS NOTE ADULT - ASSESSMENT
83 y/o male with ESRD on HD (MWF), HTN, RA on MTX and steroids, R leg amputation, severe CDI and megacolon, Severe PAD s/p LLE endarterectomy 2 weeks ago with dr dugan presents with T 100 and DC from L groin.  Pt also c/o R arm pain/swelling where pt had fistulogram of the AC fistula on last admission.  RUL dopplers negative for DVT.  L groin sono--negative for pseudo aneurysm and + hematoma.  Pt given 2.1 L IVF, IV vanco/aztreonam in ED  On my exam in HD suite, awake, chronically ill appearing, NAD, daughter at bedside. Pt seen by Dr dugan in ED.  CXR--clear    4/20  s/p hd yesterday  prolonged bleed from access stopped w pressure  feels well  arm less tender  received 1 u prbc on hd yesterday  monitor cbc    4/22 SY  --ESRD : HD order and tx reviewed.   Tolerating tx well.  --AVF : as above.  Prolonged bleeding post tx.  Venous pressure acceptable today.  Monitor AVF function.  --Anemia : with acute loss.  Active infection leading to LETICIA hyporesponsiveness.  Transfuse 2 units today.  --PAD : post Left Ileofemoral Endarterectomy : Monitor Left foot perfusion.  --ID ; Left Groin infection : Continue abtx.  For debridement in am.    4/23  as above  For HD Wednesday  groin wash out today    4/24 SY  --ESRD : HD order and tx reviewed.  Tolerating tx well.  AVF cannulated without difficulty.  --Left Groin wound --Post  Muscle Flap Coverage.  Continue abtx.  --PAD : monitor perfusion.  --ID : continue Cefepime.    4/25 SY  --ESRD : Continue TIW HD  --AVF : cannulated without difficulty.  RUE edema much improved.  --Left Groin wound : post Muscle Flap Coverage.   Continue abtx.  --ID: continue abtx.  --PAD : post Left Ileofemoral endarterectomy : monitor perfusion.    4/26  seen on hd  in good spirits   stable on hd  fluid removal reduced due to low BP  hgb stable    4/27 SY  --ESRD : Continue TIW HD  --AVF : functioning well.  --PAD : Post Left Ileofemoral Endarterectomy : monitor perfusion  --Left Groin Wound : post Muscle Flap Coverage : continue abtx and wound care.

## 2019-04-27 NOTE — PROGRESS NOTE ADULT - PROBLEM SELECTOR PLAN 5
Pt w/ hx of recurrent C diff w/ megacolon  -ID recs appreciated  -continue Vancomycin PO( day #9) Pt w/ hx of recurrent C diff w/ megacolon  -ID recs appreciated  -continue Vancomycin PO (day #9)

## 2019-04-28 DIAGNOSIS — R04.2 HEMOPTYSIS: ICD-10-CM

## 2019-04-28 LAB
-  AMPICILLIN: SIGNIFICANT CHANGE UP
-  DAPTOMYCIN: SIGNIFICANT CHANGE UP
-  LEVOFLOXACIN: SIGNIFICANT CHANGE UP
-  LINEZOLID: SIGNIFICANT CHANGE UP
-  TETRACYCLINE: SIGNIFICANT CHANGE UP
-  VANCOMYCIN: SIGNIFICANT CHANGE UP
ANION GAP SERPL CALC-SCNC: 7 MMOL/L — SIGNIFICANT CHANGE UP (ref 5–17)
BUN SERPL-MCNC: 27 MG/DL — HIGH (ref 7–23)
CALCIUM SERPL-MCNC: 8.1 MG/DL — LOW (ref 8.5–10.1)
CHLORIDE SERPL-SCNC: 107 MMOL/L — SIGNIFICANT CHANGE UP (ref 96–108)
CO2 SERPL-SCNC: 24 MMOL/L — SIGNIFICANT CHANGE UP (ref 22–31)
CREAT SERPL-MCNC: 3.83 MG/DL — HIGH (ref 0.5–1.3)
CULTURE RESULTS: SIGNIFICANT CHANGE UP
CULTURE RESULTS: SIGNIFICANT CHANGE UP
GLUCOSE SERPL-MCNC: 90 MG/DL — SIGNIFICANT CHANGE UP (ref 70–99)
HCT VFR BLD CALC: 32.4 % — LOW (ref 39–50)
HGB BLD-MCNC: 10.1 G/DL — LOW (ref 13–17)
INR BLD: 6.31 RATIO — CRITICAL HIGH (ref 0.88–1.16)
MCHC RBC-ENTMCNC: 31.2 GM/DL — LOW (ref 32–36)
MCHC RBC-ENTMCNC: 31.3 PG — SIGNIFICANT CHANGE UP (ref 27–34)
MCV RBC AUTO: 100.3 FL — HIGH (ref 80–100)
METHOD TYPE: SIGNIFICANT CHANGE UP
NRBC # BLD: 0 /100 WBCS — SIGNIFICANT CHANGE UP (ref 0–0)
ORGANISM # SPEC MICROSCOPIC CNT: SIGNIFICANT CHANGE UP
PLATELET # BLD AUTO: 310 K/UL — SIGNIFICANT CHANGE UP (ref 150–400)
POTASSIUM SERPL-MCNC: 4.3 MMOL/L — SIGNIFICANT CHANGE UP (ref 3.5–5.3)
POTASSIUM SERPL-SCNC: 4.3 MMOL/L — SIGNIFICANT CHANGE UP (ref 3.5–5.3)
PROTHROM AB SERPL-ACNC: 74.1 SEC — HIGH (ref 10–12.9)
RBC # BLD: 3.23 M/UL — LOW (ref 4.2–5.8)
RBC # FLD: 20.8 % — HIGH (ref 10.3–14.5)
SODIUM SERPL-SCNC: 138 MMOL/L — SIGNIFICANT CHANGE UP (ref 135–145)
SPECIMEN SOURCE: SIGNIFICANT CHANGE UP
SPECIMEN SOURCE: SIGNIFICANT CHANGE UP
WBC # BLD: 7.04 K/UL — SIGNIFICANT CHANGE UP (ref 3.8–10.5)
WBC # FLD AUTO: 7.04 K/UL — SIGNIFICANT CHANGE UP (ref 3.8–10.5)

## 2019-04-28 PROCEDURE — 71250 CT THORAX DX C-: CPT | Mod: 26

## 2019-04-28 RX ORDER — PHYTONADIONE (VIT K1) 5 MG
2.5 TABLET ORAL ONCE
Qty: 0 | Refills: 0 | Status: COMPLETED | OUTPATIENT
Start: 2019-04-28 | End: 2019-04-28

## 2019-04-28 RX ADMIN — Medication 100 MILLIGRAM(S): at 12:58

## 2019-04-28 RX ADMIN — HYDROMORPHONE HYDROCHLORIDE 0.5 MILLIGRAM(S): 2 INJECTION INTRAMUSCULAR; INTRAVENOUS; SUBCUTANEOUS at 21:31

## 2019-04-28 RX ADMIN — FINASTERIDE 5 MILLIGRAM(S): 5 TABLET, FILM COATED ORAL at 12:54

## 2019-04-28 RX ADMIN — CEFEPIME 1000 MILLIGRAM(S): 1 INJECTION, POWDER, FOR SOLUTION INTRAMUSCULAR; INTRAVENOUS at 12:54

## 2019-04-28 RX ADMIN — SEVELAMER CARBONATE 800 MILLIGRAM(S): 2400 POWDER, FOR SUSPENSION ORAL at 09:05

## 2019-04-28 RX ADMIN — Medication 60 MILLIGRAM(S): at 18:34

## 2019-04-28 RX ADMIN — Medication 125 MILLIGRAM(S): at 05:30

## 2019-04-28 RX ADMIN — OXYCODONE AND ACETAMINOPHEN 2 TABLET(S): 5; 325 TABLET ORAL at 12:55

## 2019-04-28 RX ADMIN — Medication 5 MILLIGRAM(S): at 13:03

## 2019-04-28 RX ADMIN — SODIUM CHLORIDE 3 MILLILITER(S): 9 INJECTION INTRAMUSCULAR; INTRAVENOUS; SUBCUTANEOUS at 22:00

## 2019-04-28 RX ADMIN — Medication 60 MILLIGRAM(S): at 05:37

## 2019-04-28 RX ADMIN — HYDROMORPHONE HYDROCHLORIDE 0.5 MILLIGRAM(S): 2 INJECTION INTRAMUSCULAR; INTRAVENOUS; SUBCUTANEOUS at 15:45

## 2019-04-28 RX ADMIN — HYDROMORPHONE HYDROCHLORIDE 0.5 MILLIGRAM(S): 2 INJECTION INTRAMUSCULAR; INTRAVENOUS; SUBCUTANEOUS at 05:39

## 2019-04-28 RX ADMIN — Medication 2.5 MILLIGRAM(S): at 11:57

## 2019-04-28 RX ADMIN — Medication 125 MILLIGRAM(S): at 18:33

## 2019-04-28 RX ADMIN — SODIUM CHLORIDE 3 MILLILITER(S): 9 INJECTION INTRAMUSCULAR; INTRAVENOUS; SUBCUTANEOUS at 05:34

## 2019-04-28 RX ADMIN — OXYCODONE AND ACETAMINOPHEN 2 TABLET(S): 5; 325 TABLET ORAL at 19:10

## 2019-04-28 RX ADMIN — PANTOPRAZOLE SODIUM 40 MILLIGRAM(S): 20 TABLET, DELAYED RELEASE ORAL at 05:37

## 2019-04-28 RX ADMIN — CHOLESTYRAMINE 4 GRAM(S): 4 POWDER, FOR SUSPENSION ORAL at 09:05

## 2019-04-28 RX ADMIN — HYDROMORPHONE HYDROCHLORIDE 0.5 MILLIGRAM(S): 2 INJECTION INTRAMUSCULAR; INTRAVENOUS; SUBCUTANEOUS at 15:31

## 2019-04-28 RX ADMIN — Medication 60 MILLIGRAM(S): at 12:55

## 2019-04-28 RX ADMIN — SODIUM CHLORIDE 3 MILLILITER(S): 9 INJECTION INTRAMUSCULAR; INTRAVENOUS; SUBCUTANEOUS at 14:26

## 2019-04-28 RX ADMIN — SEVELAMER CARBONATE 800 MILLIGRAM(S): 2400 POWDER, FOR SUSPENSION ORAL at 18:33

## 2019-04-28 RX ADMIN — Medication 1 APPLICATION(S): at 12:54

## 2019-04-28 RX ADMIN — HYDROMORPHONE HYDROCHLORIDE 0.5 MILLIGRAM(S): 2 INJECTION INTRAMUSCULAR; INTRAVENOUS; SUBCUTANEOUS at 06:09

## 2019-04-28 RX ADMIN — HYDROMORPHONE HYDROCHLORIDE 0.5 MILLIGRAM(S): 2 INJECTION INTRAMUSCULAR; INTRAVENOUS; SUBCUTANEOUS at 21:01

## 2019-04-28 RX ADMIN — Medication 81 MILLIGRAM(S): at 12:59

## 2019-04-28 RX ADMIN — OXYCODONE AND ACETAMINOPHEN 2 TABLET(S): 5; 325 TABLET ORAL at 18:33

## 2019-04-28 RX ADMIN — Medication 125 MILLIGRAM(S): at 13:02

## 2019-04-28 RX ADMIN — OXYCODONE AND ACETAMINOPHEN 2 TABLET(S): 5; 325 TABLET ORAL at 13:45

## 2019-04-28 RX ADMIN — ATORVASTATIN CALCIUM 20 MILLIGRAM(S): 80 TABLET, FILM COATED ORAL at 21:01

## 2019-04-28 NOTE — PROGRESS NOTE ADULT - PROBLEM SELECTOR PLAN 2
Pt w/ hemoptysis could be from the low platelet  -chest xray as above: PNA vs neoplasm vs artifact  -CT scan of chest  -pulmonary consult

## 2019-04-28 NOTE — PROGRESS NOTE ADULT - SUBJECTIVE AND OBJECTIVE BOX
CHIEF COMPLAINT:    SUBJECTIVE:   85 y/o male with ESRD on HD (MWF), HTN, RA on MTX and steroids, R leg amputation, severe CDI and megacolon, Severe PAD s/p LLE endarterectomy (4/1/19)with dr Clement presents with T 100.5 and DC from L groin.  Pt also c/o R arm pain/swelling where pt had fistulogram of the AC fistula on last admission.    4/28: pt seen and examined at bedside. Pt has no complaints and was being placed in the chair  REVIEW OF SYSTEMS:  CONSTITUTIONAL: No weakness, fevers or chills  EYES/ENT: No visual changes;  No vertigo or throat pain   NECK: No pain or stiffness  RESPIRATORY: No cough, wheezing, hemoptysis; No shortness of breath  CARDIOVASCULAR: No chest pain or palpitations  GASTROINTESTINAL: No abdominal or epigastric pain. No nausea, vomiting, or hematemesis; No diarrhea or constipation. No melena or hematochezia.  GENITOURINARY: No dysuria, frequency or hematuria  NEUROLOGICAL: No numbness or weakness  SKIN: No itching, burning, rashes, or lesions. Incision site on LLE   All other review of systems is negative unless indicated above    Vital Signs Last 24 Hrs  T(C): 36.8 (28 Apr 2019 10:08), Max: 36.8 (28 Apr 2019 10:08)  T(F): 98.3 (28 Apr 2019 10:08), Max: 98.3 (28 Apr 2019 10:08)  HR: 77 (28 Apr 2019 11:00) (68 - 80)  BP: 138/29 (28 Apr 2019 11:00) (103/38 - 138/29)  BP(mean): 54 (28 Apr 2019 11:00) (40 - 97)  RR: 20 (28 Apr 2019 11:00) (15 - 23)  SpO2: 100% (28 Apr 2019 11:00) (95% - 100%)    I&O's Summary      CAPILLARY BLOOD GLUCOSE          PHYSICAL EXAM:  Constitutional: NAD, awake and alert, well-developed  HEENT: PERR, EOMI, Normal Hearing, MMM  Neck: Soft and supple, No LAD, No JVD  Respiratory: Breath sounds are clear bilaterally, No wheezing, rales or rhonchi  Cardiovascular: S1 and S2, regular rate and rhythm, no Murmurs, gallops or rubs  Gastrointestinal: Bowel Sounds present, soft, nontender, nondistended, no guarding, no rebound  Extremities: No peripheral edema  Vascular: 2+ peripheral pulses  Neurological: A/O x 3, no focal deficits  Musculoskeletal: 5/5 strength b/l upper and lower extremities  Skin: No rashes. LLE incision site clean and dry, + erythema, no drainage     MEDICATIONS:  MEDICATIONS  (STANDING):  allopurinol 100 milliGRAM(s) Oral <User Schedule>  aspirin  chewable 81 milliGRAM(s) Oral daily  atorvastatin 20 milliGRAM(s) Oral at bedtime  cefepime  Injectable. 1000 milliGRAM(s) IV Push daily  cholestyramine Powder (Sugar-Free) 4 Gram(s) Oral daily  DAPTOmycin IVPB      DAPTOmycin IVPB 290 milliGRAM(s) IV Intermittent every 48 hours  diltiazem    Tablet 60 milliGRAM(s) Oral four times a day  doxercalciferol Injectable 1 MICROGram(s) IV Push <User Schedule>  epoetin nelly Injectable 39918 Unit(s) IV Push <User Schedule>  finasteride 5 milliGRAM(s) Oral daily  lidocaine/prilocaine Cream 1 Application(s) Topical daily  pantoprazole    Tablet 40 milliGRAM(s) Oral before breakfast  phytonadione   Solution 2.5 milliGRAM(s) Oral once  predniSONE   Tablet 2.5 milliGRAM(s) Oral every other day  predniSONE   Tablet 5 milliGRAM(s) Oral every other day  sevelamer hydrochloride Oral Tab/Cap - Peds 800 milliGRAM(s) Oral three times a day with meals  silver sulfADIAZINE 1% Cream 1 Application(s) Topical daily  sodium chloride 0.9% lock flush 3 milliLiter(s) IV Push every 8 hours  vancomycin    Solution 125 milliGRAM(s) Oral every 6 hours      LABS: All Labs Reviewed:                        10.1   7.04  )-----------( 310      ( 28 Apr 2019 06:23 )             32.4     04-28    138  |  107  |  27<H>  ----------------------------<  90  4.3   |  24  |  3.83<H>    Ca    8.1<L>      28 Apr 2019 06:23      PT/INR - ( 28 Apr 2019 06:23 )   PT: 74.1 sec;   INR: 6.31 ratio               Blood Culture: 04-23 @ 15:08  Organism Enterococcus faecium (vancomycin resistant)  Gram Stain Blood -- Gram Stain --  Specimen Source .Surgical Swab left groin deep wound culture  Culture-Blood --        RADIOLOGY/EKG:  < from: Xray Chest 1 View- PORTABLE-Urgent (04.27.19 @ 15:51) >  Impression:    Right lower lobe opacity may represent pneumonia versus neoplasm versus   vascular artifact, recommend correlation with clinical symptoms and   short-term follow-up to resolution    < end of copied text >      < from: US Duplex Arterial Lower Ext Ltd, Left (04.19.19 @ 12:13) >  Impression:    Focal ultrasound of the left groin demonstrates no evidence of   pseudoaneurysm.    Hematoma anterior to the left common femoral artery measuring   approximately 3.2 x 0.9 x 5.0 cm.    There is plaque within the proximal left femoral artery, with an elevated   velocity of 110 cm/s.        < from: US Duplex Venous Upper Ext Ltd, Right (04.19.19 @ 12:16) >  IMPRESSION:     No evidence of right upper extremity deep venous thrombosis.  Markedly increased velocities within the patient's arteriovenous fistula   raising suspicious for hemodynamically significant stenosis.    DVT PPX:    ADVANCED DIRECTIVE:    DISPOSITION:

## 2019-04-28 NOTE — PROGRESS NOTE ADULT - PROBLEM SELECTOR PLAN 6
Rate controlled  coumadin held  Supratherapeutic INR of 6.3. Will give vit K  2.5  Cardizem 60mg po q6h

## 2019-04-28 NOTE — PROVIDER CONTACT NOTE (CRITICAL VALUE NOTIFICATION) - SITUATION
culture - gram stain rare enterococcus faceium - vancomycin resistant     rare anti co aug neg staphococcal

## 2019-04-28 NOTE — PROGRESS NOTE ADULT - SUBJECTIVE AND OBJECTIVE BOX
NEPHROLOGY INTERVAL HPI/OVERNIGHT EVENTS:    Date of Service: 04-28-19 @ 16:38  4/28 SY  Feeling fair.  No new complaints.    4/27 SY  Resting comfortably  No new complaints.  Reports much improved pain in Left groin and foot.    4/26  stable groin flap  in SSD  seen on hd  in good spirits   stable on hd  fluid removal reduced due to low BP  hgb stable    4/25 SY  Feeling fair.  Continues with pain in Left groin and foot.  Pain control tolerable.    4/24 SY  Post Muscle Flap coverage of Left groin wound.  Reports pain in left foot.  Ate breakfast well.  No SOB    4/23  feels much better today  arm less swollen  for OR later washout groin wound  HD in am    4/22 SY  Alert.  No acute distress.  Complains of soreness in Left foot.  Continued pain in Left Groin area.  Right Arm and Elbow pain improved.    HPI:  83 y/o male with ESRD on HD (MWF), HTN, RA on MTX and steroids, R leg amputation, severe CDI and megacolon, Severe PAD s/p LLE endarterectomy 2 weeks ago with dr dugan presents with T 100 and DC from L groin.  Pt also c/o R arm pain/swelling where pt had fistulogram of the AC fistula on last admission.  RUL dopplers negative for DVT.  L groin sono--negative for pseudo aneurysm and + hematoma.  Pt given 2.1 L IVF, IV vanco/aztreonam in ED  On my exam in HD suite, awake, chronically ill appearing, NAD, daughter at bedside. Pt seen by Dr dugan in ED.  CXR--clear    TTE (10/18)--mild-mod MR/EF 60%    Pt d/w daughter regarding advance directives--Pt is FULL RESUSCITATION (19 Apr 2019 16:12)      MEDICATIONS  (STANDING):  allopurinol 100 milliGRAM(s) Oral <User Schedule>  aspirin  chewable 81 milliGRAM(s) Oral daily  atorvastatin 20 milliGRAM(s) Oral at bedtime  cefepime  Injectable. 1000 milliGRAM(s) IV Push daily  cholestyramine Powder (Sugar-Free) 4 Gram(s) Oral daily  DAPTOmycin IVPB      DAPTOmycin IVPB 290 milliGRAM(s) IV Intermittent every 48 hours  diltiazem    Tablet 60 milliGRAM(s) Oral four times a day  doxercalciferol Injectable 1 MICROGram(s) IV Push <User Schedule>  epoetin nelly Injectable 79940 Unit(s) IV Push <User Schedule>  finasteride 5 milliGRAM(s) Oral daily  lidocaine/prilocaine Cream 1 Application(s) Topical daily  pantoprazole    Tablet 40 milliGRAM(s) Oral before breakfast  predniSONE   Tablet 2.5 milliGRAM(s) Oral every other day  predniSONE   Tablet 5 milliGRAM(s) Oral every other day  sevelamer hydrochloride Oral Tab/Cap - Peds 800 milliGRAM(s) Oral three times a day with meals  silver sulfADIAZINE 1% Cream 1 Application(s) Topical daily  sodium chloride 0.9% lock flush 3 milliLiter(s) IV Push every 8 hours  vancomycin    Solution 125 milliGRAM(s) Oral every 6 hours    MEDICATIONS  (PRN):  acetaminophen   Tablet .. 650 milliGRAM(s) Oral every 6 hours PRN Temp greater or equal to 38.5C (101.3F)  acetaminophen   Tablet .. 650 milliGRAM(s) Oral every 6 hours PRN Mild Pain (1 - 3)  HYDROmorphone  Injectable 0.5 milliGRAM(s) IV Push every 4 hours PRN breakthrough pain  oxyCODONE    5 mG/acetaminophen 325 mG 2 Tablet(s) Oral every 6 hours PRN Moderate Pain (4 - 6)    Vital Signs Last 24 Hrs  T(C): 36.9 (28 Apr 2019 14:03), Max: 36.9 (28 Apr 2019 14:03)  T(F): 98.5 (28 Apr 2019 14:03), Max: 98.5 (28 Apr 2019 14:03)  HR: 73 (28 Apr 2019 16:00) (67 - 80)  BP: 117/19 (28 Apr 2019 16:00) (104/51 - 147/23)  BP(mean): 40 (28 Apr 2019 16:00) (40 - 93)  RR: 16 (28 Apr 2019 16:00) (15 - 23)  SpO2: 99% (28 Apr 2019 16:00) (96% - 100%)    PHYSICAL EXAM:  Alert and comfortable  GENERAL: No distress  CHEST/LUNG: Clear to aus  HEART: S1S2 RRR  ABDOMEN: soft  EXTREMITIES: no edema  SKIN:     LABS:                        10.1   7.04  )-----------( 310      ( 28 Apr 2019 06:23 )             32.4     04-28    138  |  107  |  27<H>  ----------------------------<  90  4.3   |  24  |  3.83<H>    Ca    8.1<L>      28 Apr 2019 06:23      PT/INR - ( 28 Apr 2019 06:23 )   PT: 74.1 sec;   INR: 6.31 ratio                     RADIOLOGY & ADDITIONAL TESTS:

## 2019-04-28 NOTE — PROGRESS NOTE ADULT - ASSESSMENT
83 y/o male with ESRD on HD (MWF), HTN, RA on MTX and steroids, R leg amputation, severe CDI and megacolon, Severe PAD s/p LLE endarterectomy 2 weeks ago with dr dugan presents with T 100 and DC from L groin.  Pt also c/o R arm pain/swelling where pt had fistulogram of the AC fistula on last admission.  RUL dopplers negative for DVT.  L groin sono--negative for pseudo aneurysm and + hematoma.  Pt given 2.1 L IVF, IV vanco/aztreonam in ED  On my exam in HD suite, awake, chronically ill appearing, NAD, daughter at bedside. Pt seen by Dr dugan in ED.  CXR--clear    4/20  s/p hd yesterday  prolonged bleed from access stopped w pressure  feels well  arm less tender  received 1 u prbc on hd yesterday  monitor cbc    4/22 SY  --ESRD : HD order and tx reviewed.   Tolerating tx well.  --AVF : as above.  Prolonged bleeding post tx.  Venous pressure acceptable today.  Monitor AVF function.  --Anemia : with acute loss.  Active infection leading to LETICIA hyporesponsiveness.  Transfuse 2 units today.  --PAD : post Left Ileofemoral Endarterectomy : Monitor Left foot perfusion.  --ID ; Left Groin infection : Continue abtx.  For debridement in am.    4/23  as above  For HD Wednesday  groin wash out today    4/24 SY  --ESRD : HD order and tx reviewed.  Tolerating tx well.  AVF cannulated without difficulty.  --Left Groin wound --Post  Muscle Flap Coverage.  Continue abtx.  --PAD : monitor perfusion.  --ID : continue Cefepime.    4/25 SY  --ESRD : Continue TIW HD  --AVF : cannulated without difficulty.  RUE edema much improved.  --Left Groin wound : post Muscle Flap Coverage.   Continue abtx.  --ID: continue abtx.  --PAD : post Left Ileofemoral endarterectomy : monitor perfusion.    4/26  seen on hd  in good spirits   stable on hd  fluid removal reduced due to low BP  hgb stable    4/27 SY  --ESRD : Continue TIW HD  --AVF : functioning well.  --PAD : Post Left Ileofemoral Endarterectomy : monitor perfusion  --Left Groin Wound : post Muscle Flap Coverage : continue abtx and wound care.    4/28 SY  --ESRD : for HD in am  --AVF : functioning well with improved cannulation.  --PAD : monitor perfusion ( Post Left Ileofemoral Endarterectomy)  --Left Groin wound : post Muscle Flap : continue abtx and wound care.

## 2019-04-28 NOTE — PROGRESS NOTE ADULT - SUBJECTIVE AND OBJECTIVE BOX
No complaints. Doing well  Afebrile, VSS  WBC 7  Left groin incision still mildly ecchymotic, but viable. Minimal surrounding erythema, and serosanguinous drainage.  Muscle flap donor site incision C/D/I  Continue dry dressings to left groin.

## 2019-04-29 DIAGNOSIS — R41.0 DISORIENTATION, UNSPECIFIED: ICD-10-CM

## 2019-04-29 LAB
ADD ON TEST-SPECIMEN IN LAB: SIGNIFICANT CHANGE UP
ALBUMIN SERPL ELPH-MCNC: 1.8 G/DL — LOW (ref 3.3–5)
AMMONIA BLD-MCNC: 24 UMOL/L — SIGNIFICANT CHANGE UP (ref 11–32)
ANION GAP SERPL CALC-SCNC: 8 MMOL/L — SIGNIFICANT CHANGE UP (ref 5–17)
BASE EXCESS BLDA CALC-SCNC: -3.1 MMOL/L — LOW (ref -2–2)
BUN SERPL-MCNC: 35 MG/DL — HIGH (ref 7–23)
CALCIUM SERPL-MCNC: 8 MG/DL — LOW (ref 8.5–10.1)
CALCIUM SERPL-MCNC: 8.1 MG/DL — LOW (ref 8.4–10.5)
CHLORIDE SERPL-SCNC: 108 MMOL/L — SIGNIFICANT CHANGE UP (ref 96–108)
CO2 SERPL-SCNC: 22 MMOL/L — SIGNIFICANT CHANGE UP (ref 22–31)
CREAT SERPL-MCNC: 4.61 MG/DL — HIGH (ref 0.5–1.3)
GAS PNL BLDA: SIGNIFICANT CHANGE UP
GLUCOSE SERPL-MCNC: 95 MG/DL — SIGNIFICANT CHANGE UP (ref 70–99)
HCO3 BLDA-SCNC: 22 MMOL/L — SIGNIFICANT CHANGE UP (ref 21–29)
HCT VFR BLD CALC: 33.2 % — LOW (ref 39–50)
HGB BLD-MCNC: 10.1 G/DL — LOW (ref 13–17)
INR BLD: 3.46 RATIO — HIGH (ref 0.88–1.16)
MCHC RBC-ENTMCNC: 30.4 GM/DL — LOW (ref 32–36)
MCHC RBC-ENTMCNC: 31.1 PG — SIGNIFICANT CHANGE UP (ref 27–34)
MCV RBC AUTO: 102.2 FL — HIGH (ref 80–100)
NRBC # BLD: 0 /100 WBCS — SIGNIFICANT CHANGE UP (ref 0–0)
PCO2 BLDA: 40 MMHG — SIGNIFICANT CHANGE UP (ref 32–46)
PH BLDA: 7.35 — SIGNIFICANT CHANGE UP (ref 7.35–7.45)
PHOSPHATE SERPL-MCNC: 3.4 MG/DL — SIGNIFICANT CHANGE UP (ref 2.5–4.5)
PLATELET # BLD AUTO: 334 K/UL — SIGNIFICANT CHANGE UP (ref 150–400)
PO2 BLDA: 35 MMHG — CRITICAL LOW (ref 74–108)
POTASSIUM SERPL-MCNC: 4.4 MMOL/L — SIGNIFICANT CHANGE UP (ref 3.5–5.3)
POTASSIUM SERPL-SCNC: 4.4 MMOL/L — SIGNIFICANT CHANGE UP (ref 3.5–5.3)
PROTHROM AB SERPL-ACNC: 39.9 SEC — HIGH (ref 10–12.9)
PTH-INTACT FLD-MCNC: 39 PG/ML — SIGNIFICANT CHANGE UP (ref 15–65)
RBC # BLD: 3.25 M/UL — LOW (ref 4.2–5.8)
RBC # FLD: 21 % — HIGH (ref 10.3–14.5)
SAO2 % BLDA: 68 % — LOW (ref 92–96)
SODIUM SERPL-SCNC: 138 MMOL/L — SIGNIFICANT CHANGE UP (ref 135–145)
WBC # BLD: 7.96 K/UL — SIGNIFICANT CHANGE UP (ref 3.8–10.5)
WBC # FLD AUTO: 7.96 K/UL — SIGNIFICANT CHANGE UP (ref 3.8–10.5)

## 2019-04-29 PROCEDURE — 76536 US EXAM OF HEAD AND NECK: CPT | Mod: 26

## 2019-04-29 RX ORDER — ALBUTEROL 90 UG/1
2 AEROSOL, METERED ORAL EVERY 6 HOURS
Qty: 0 | Refills: 0 | Status: DISCONTINUED | OUTPATIENT
Start: 2019-04-29 | End: 2019-05-06

## 2019-04-29 RX ORDER — IPRATROPIUM/ALBUTEROL SULFATE 18-103MCG
3 AEROSOL WITH ADAPTER (GRAM) INHALATION ONCE
Qty: 0 | Refills: 0 | Status: COMPLETED | OUTPATIENT
Start: 2019-04-29 | End: 2019-04-29

## 2019-04-29 RX ADMIN — LIDOCAINE AND PRILOCAINE CREAM 1 APPLICATION(S): 25; 25 CREAM TOPICAL at 09:59

## 2019-04-29 RX ADMIN — Medication 3 MILLILITER(S): at 14:42

## 2019-04-29 RX ADMIN — FINASTERIDE 5 MILLIGRAM(S): 5 TABLET, FILM COATED ORAL at 13:03

## 2019-04-29 RX ADMIN — Medication 125 MILLIGRAM(S): at 22:52

## 2019-04-29 RX ADMIN — DAPTOMYCIN 111.6 MILLIGRAM(S): 500 INJECTION, POWDER, LYOPHILIZED, FOR SOLUTION INTRAVENOUS at 15:57

## 2019-04-29 RX ADMIN — Medication 81 MILLIGRAM(S): at 15:57

## 2019-04-29 RX ADMIN — DOXERCALCIFEROL 1 MICROGRAM(S): 2.5 CAPSULE ORAL at 14:03

## 2019-04-29 RX ADMIN — SODIUM CHLORIDE 3 MILLILITER(S): 9 INJECTION INTRAMUSCULAR; INTRAVENOUS; SUBCUTANEOUS at 22:00

## 2019-04-29 RX ADMIN — SEVELAMER CARBONATE 800 MILLIGRAM(S): 2400 POWDER, FOR SUSPENSION ORAL at 16:20

## 2019-04-29 RX ADMIN — Medication 2.5 MILLIGRAM(S): at 16:20

## 2019-04-29 RX ADMIN — Medication 125 MILLIGRAM(S): at 00:25

## 2019-04-29 RX ADMIN — ERYTHROPOIETIN 10000 UNIT(S): 10000 INJECTION, SOLUTION INTRAVENOUS; SUBCUTANEOUS at 14:04

## 2019-04-29 RX ADMIN — Medication 60 MILLIGRAM(S): at 15:58

## 2019-04-29 RX ADMIN — Medication 650 MILLIGRAM(S): at 23:35

## 2019-04-29 RX ADMIN — Medication 650 MILLIGRAM(S): at 12:08

## 2019-04-29 RX ADMIN — SODIUM CHLORIDE 3 MILLILITER(S): 9 INJECTION INTRAMUSCULAR; INTRAVENOUS; SUBCUTANEOUS at 13:07

## 2019-04-29 RX ADMIN — HYDROMORPHONE HYDROCHLORIDE 0.5 MILLIGRAM(S): 2 INJECTION INTRAMUSCULAR; INTRAVENOUS; SUBCUTANEOUS at 05:34

## 2019-04-29 RX ADMIN — OXYCODONE AND ACETAMINOPHEN 2 TABLET(S): 5; 325 TABLET ORAL at 00:47

## 2019-04-29 RX ADMIN — Medication 60 MILLIGRAM(S): at 22:52

## 2019-04-29 RX ADMIN — Medication 125 MILLIGRAM(S): at 18:04

## 2019-04-29 RX ADMIN — OXYCODONE AND ACETAMINOPHEN 2 TABLET(S): 5; 325 TABLET ORAL at 01:17

## 2019-04-29 RX ADMIN — ATORVASTATIN CALCIUM 20 MILLIGRAM(S): 80 TABLET, FILM COATED ORAL at 22:53

## 2019-04-29 RX ADMIN — CHOLESTYRAMINE 4 GRAM(S): 4 POWDER, FOR SUSPENSION ORAL at 09:53

## 2019-04-29 RX ADMIN — PANTOPRAZOLE SODIUM 40 MILLIGRAM(S): 20 TABLET, DELAYED RELEASE ORAL at 05:33

## 2019-04-29 RX ADMIN — Medication 125 MILLIGRAM(S): at 05:32

## 2019-04-29 RX ADMIN — Medication 650 MILLIGRAM(S): at 13:00

## 2019-04-29 RX ADMIN — Medication 125 MILLIGRAM(S): at 13:03

## 2019-04-29 RX ADMIN — SEVELAMER CARBONATE 800 MILLIGRAM(S): 2400 POWDER, FOR SUSPENSION ORAL at 09:53

## 2019-04-29 RX ADMIN — Medication 650 MILLIGRAM(S): at 22:53

## 2019-04-29 RX ADMIN — Medication 100 MILLIGRAM(S): at 15:56

## 2019-04-29 RX ADMIN — CEFEPIME 1000 MILLIGRAM(S): 1 INJECTION, POWDER, FOR SOLUTION INTRAMUSCULAR; INTRAVENOUS at 15:57

## 2019-04-29 RX ADMIN — Medication 60 MILLIGRAM(S): at 00:25

## 2019-04-29 RX ADMIN — SODIUM CHLORIDE 3 MILLILITER(S): 9 INJECTION INTRAMUSCULAR; INTRAVENOUS; SUBCUTANEOUS at 05:32

## 2019-04-29 RX ADMIN — Medication 60 MILLIGRAM(S): at 10:01

## 2019-04-29 RX ADMIN — Medication 1 APPLICATION(S): at 13:16

## 2019-04-29 NOTE — PROGRESS NOTE ADULT - SUBJECTIVE AND OBJECTIVE BOX
NEPHROLOGY INTERVAL HPI/OVERNIGHT EVENTS:  04-29-19 @ 11:02  to start hd soon, confused and agitated  + dark sputum with blood tinge  has not been using his bipap  now with jerking motion    MEDICATIONS  (STANDING):  ALBUTerol/ipratropium for Nebulization. 3 milliLiter(s) Nebulizer once  allopurinol 100 milliGRAM(s) Oral <User Schedule>  aspirin  chewable 81 milliGRAM(s) Oral daily  atorvastatin 20 milliGRAM(s) Oral at bedtime  cefepime  Injectable. 1000 milliGRAM(s) IV Push daily  cholestyramine Powder (Sugar-Free) 4 Gram(s) Oral daily  DAPTOmycin IVPB      DAPTOmycin IVPB 290 milliGRAM(s) IV Intermittent every 48 hours  diltiazem    Tablet 60 milliGRAM(s) Oral four times a day  doxercalciferol Injectable 1 MICROGram(s) IV Push <User Schedule>  epoetin nelly Injectable 51001 Unit(s) IV Push <User Schedule>  finasteride 5 milliGRAM(s) Oral daily  lidocaine/prilocaine Cream 1 Application(s) Topical daily  pantoprazole    Tablet 40 milliGRAM(s) Oral before breakfast  predniSONE   Tablet 2.5 milliGRAM(s) Oral every other day  predniSONE   Tablet 5 milliGRAM(s) Oral every other day  sevelamer hydrochloride Oral Tab/Cap - Peds 800 milliGRAM(s) Oral three times a day with meals  silver sulfADIAZINE 1% Cream 1 Application(s) Topical daily  sodium chloride 0.9% lock flush 3 milliLiter(s) IV Push every 8 hours  vancomycin    Solution 125 milliGRAM(s) Oral every 6 hours    MEDICATIONS  (PRN):  acetaminophen   Tablet .. 650 milliGRAM(s) Oral every 6 hours PRN Temp greater or equal to 38.5C (101.3F)  acetaminophen   Tablet .. 650 milliGRAM(s) Oral every 6 hours PRN Mild Pain (1 - 3)  HYDROmorphone  Injectable 0.5 milliGRAM(s) IV Push every 4 hours PRN breakthrough pain  oxyCODONE    5 mG/acetaminophen 325 mG 2 Tablet(s) Oral every 6 hours PRN Moderate Pain (4 - 6)      Allergies    Zosyn (Rash)    Intolerances        I&O's Detail      .    Patient was seen and evaluated on dialysis.   Patient is tolerating the procedure well.   Continue dialysis:   Dialyzer:revacelar 300 on 2k with uf goal of 2kg   with bfr of 300        Vital Signs Last 24 Hrs  T(C): 36.1 (29 Apr 2019 09:29), Max: 37 (29 Apr 2019 06:19)  T(F): 97 (29 Apr 2019 09:29), Max: 98.6 (29 Apr 2019 06:19)  HR: 68 (29 Apr 2019 08:00) (67 - 91)  BP: 110/23 (29 Apr 2019 08:00) (104/51 - 147/23)  BP(mean): 45 (29 Apr 2019 08:00) (40 - 120)  RR: 18 (29 Apr 2019 08:00) (15 - 23)  SpO2: 94% (29 Apr 2019 08:00) (94% - 99%)  Daily     Daily     PHYSICAL EXAM:  General: alert. awake Ox3, confused   HEENT: MMM  CV: s1s2 rrr  LUNGS: B/L CTA  EXT: no edema    LABS:                        10.1   7.96  )-----------( 334      ( 29 Apr 2019 07:08 )             33.2     04-29    138  |  108  |  35<H>  ----------------------------<  95  4.4   |  22  |  4.61<H>    Ca    8.0<L>      29 Apr 2019 07:08      PT/INR - ( 29 Apr 2019 07:08 )   PT: 39.9 sec;   INR: 3.46 ratio       EXAM:  CT CHEST                            PROCEDURE DATE:  04/28/2019          INTERPRETATION:  CLINICAL INFORMATION: Hemoptysis, possible neoplasm.    COMPARISON: December 21, 2015. Correlation is made to CT abdomen and   pelvis from October 5, 2018.    PROCEDURE:   CT of the Chest was performed without intravenous contrast.  Sagittal and coronal reformats were performed.    FINDINGS:    LUNGS AND LARGE AIRWAYS: Emphysema. Continued increase size of tubular   oblong opacity in the right middle lobe, possibly endobronchial mass with   impacted airways. Possible associated broncholiths. Irregular opacity in   the posterior left upper lobe (series 3, image 60), measuring 1.4 cm.  PLEURA: Small left pleural effusion. Trace right pleural effusion.  VESSELS: Atherosclerotic calcifications.   HEART: Heart size is normal. No pericardial effusion.  MEDIASTINUM AND [place]: No lymphadenopathy.  CHEST WALL AND LOWER NECK: Left thyroid nodule, measuring 2.9 cm.   Extension of the thyroid gland into the superior mediastinum.   VISUALIZED UPPER ABDOMEN: Cholelithiasis. Unchanged probable cyst in   hepatic segment 8.  BONES: Degenerative changes of the spine.    IMPRESSION:   Continued increase size of tubular oblong opacity in the right middle   lobe, possibly endobronchial mass with impacted airways. Irregular   opacity in the posterior left upper lobe, measuring 1.4 cm. PET/CT is   recommended for further evaluation.    Left thyroid nodule, measuring 2.9 cm. Further evaluation with thyroid   ultrasound is recommended.      TRUDY KLEIN M.D., ATTENDING RADIOLOGIST  This document has been electronically signed. Apr 28 2019  2:45PM

## 2019-04-29 NOTE — CONSULT NOTE ADULT - ASSESSMENT
85 y/o male with ESRD on HD (MWF), HTN, RA on MTX and steroids, R leg amputation, severe CDI and megacolon, Severe PAD s/p LLE endarterectomy 2 weeks ago with dr dugan presents with T 100 and DC from L groin.  Pt also c/o R arm pain/swelling where pt had fistulogram of the AC fistula on last admission.  RUL dopplers negative for DVT.  L groin sono--negative for pseudo aneurysm and + hematoma.  Pt given 2.1 L IVF, IV vanco/aztreonam in ED  On my exam in HD suite, awake, chronically ill appearing, NAD, daughter at bedside. Pt seen by Dr dugan in ED.  CXR--clear    TTE (10/18)--mild-mod MR/EF 60%    Pt d/w daughter regarding advance directives--Pt is FULL RESUSCITATION (19 Apr 2019 16:12)  events noted while admitted muscle flap and plastic surgery care developed increased cough with possible bloody streaked sputuim / none present now and RN at bedside, both noticed thhick yellow sputum production  ct scan findings personally reveiwed / old films are not available on PACS at this time for comparison  Continued increase size of tubular oblong opacity in the right middle   lobe, possibly endobronchial mass with impacted airways. Irregular   opacity in the posterior left upper lobe, measuring 1.4 cm    assessment / plan  suspected pneumonia with mucus plugging RML  can not r/o endobronchial lesion  separate FERNANDO pulm nodule seen needs further eval  after antibiotic therapy  pt si not good candidate for bronch at this time  outpt PET / ct is reasonable only after course of antibiotics completed  ESRD on dilaysis now  hx Sepsis, due to unspecified organism: 2/2 poorly healing wounds b/l  Sleep apnea, obstructive: Requires home 02 therapy, and treatment with BIPAP  bibasilar atelectasis with associated small effusions      agree with iv cefepime / vanco  fu imaging  may need BRONCH if no improvement  also PET as outpt is reasonable and all needs to be further discussed,   with pt and HCP depending on his overall wishes to proceed with workup as outpt  nebulizer  incentive spirometry

## 2019-04-29 NOTE — PROGRESS NOTE ADULT - SUBJECTIVE AND OBJECTIVE BOX
Pt has been seen and examined with FP resident, resident supervised agree with a/p       Patient is a 84y old  Male who presents with a chief complaint of L groin DC (24 Apr 2019 11:51)      PHYSICAL EXAM:    general- comfortable   -rs-aeeb,cta  -cvs-s1s2 normal   -p/a-soft,bs+          A/P    #Ct abx, supportive care, abx    #discussed with pt about endobronchial mass/lesion     #Discussed with Dr. Maxwell      #DVT pr

## 2019-04-29 NOTE — PROGRESS NOTE ADULT - PROBLEM SELECTOR PLAN 1
Pt who had an endarterectomy April 1st who developed an infection at entry site and fever of 100.5  -now afebrile  -dopplers of the LLE showed hematoma anterior to the left common femoral artery measuring approximately 3.2 x 0.9 x 5.0 cm  -Dr. Clement recommendation appreciated:Debridement done. States will monitor for a few more days  Pain control w/ dilaudid  s/p aztreonam  -continue cefepime Day #7, Vancomycin for c diff prophylaxis Day #9, Daptomycin added Day #5  -wound care  -wound cx growing E faecium. Vanc resistant  -ID consult appreciated

## 2019-04-29 NOTE — PROGRESS NOTE ADULT - PROBLEM SELECTOR PLAN 2
Pt w/ hemoptysis could be from the low platelet  -chest xray as above: PNA vs neoplasm vs artifact  -CT scan of chest as above  -Thyroid US results pending  -discussed with pt that the prognosis is poor and pt and family understands  -pulmonary consult appreciated PET scan outpatient and possible bronch if no improvement  -pulmonary consult

## 2019-04-29 NOTE — PROGRESS NOTE ADULT - ASSESSMENT
83 y/o male with ESRD on HD (MWF), HTN, RA on MTX and steroids, R leg amputation, severe CDI and megacolon, Severe PAD s/p LLE endarterectomy 2 weeks ago with dr dugan presents with T 100 and DC from L groin.  Pt also c/o R arm pain/swelling where pt had fistulogram of the AC fistula on last admission.  RUL dopplers negative for DVT.  L groin sono--negative for pseudo aneurysm and + hematoma, eval [facility] vascular surgery noted to have seropurulent drainage from L groin along medial flap and erythema along wound edges concerning for superimposed infection.  Pt given 2.1 L IVF, IV vanco/aztreonam in ED.     1. hemopytsis, RML opacity/pneumonia. FERNANDO pulmonary nodule. L groin wound infection/hematoma. s/p LLE endarterectomy. ESRD. PCN allergy  - pulm eval appreciated, CT chest reviewed  - plan for PET/bronchoscopy in future r/o malignancy  - s/p debridement/muscle flap closure 4/23 deep wound cx w VRE  -, on IV daptomycin #5 cpk wnl check weekly on therapy  - on cefepime 1gm daily #7  - continue with abx coverage  - s/p aztreonam  #4, s/p vancomycin #6  - on c diff prophylaxis with oral vancomycin  - blood cx no growth  - monitor temps  - tolerating abx well so far; no side effects noted  - reason for abx use and side effects reviewed with patient  - supportive care  - f/u cbc    2. other issues - care per medicine 83 y/o male with ESRD on HD (MWF), HTN, RA on MTX and steroids, R leg amputation, severe CDI and megacolon, Severe PAD s/p LLE endarterectomy 2 weeks ago with dr dugan presents with T 100 and DC from L groin.  Pt also c/o R arm pain/swelling where pt had fistulogram of the AC fistula on last admission.  RUL dopplers negative for DVT.  L groin sono--negative for pseudo aneurysm and + hematoma, eval [facility] vascular surgery noted to have seropurulent drainage from L groin along medial flap and erythema along wound edges concerning for superimposed infection.  Pt given 2.1 L IVF, IV vanco/aztreonam in ED.     1. hemopytsis, RML opacity/pneumonia. FERNANDO pulmonary nodule. L groin wound infection/hematoma. s/p LLE endarterectomy. ESRD. PCN allergy  - pulm eval appreciated, CT chest reviewed  - plan for PET/bronchoscopy in future r/o malignancy  - s/p debridement/muscle flap closure 4/23 deep wound cx w VRE/CONS  -, on IV daptomycin #5 cpk wnl check weekly on therapy for above organism coverage   - on cefepime 1gm daily #7 for gnr resistant coverage for probable pna  - continue with abx coverage  - s/p aztreonam  #4, s/p vancomycin #6  - on c diff prophylaxis with oral vancomycin  - blood cx no growth  - monitor temps  - tolerating abx well so far; no side effects noted  - reason for abx use and side effects reviewed with patient  - supportive care  - f/u cbc    2. other issues - care per medicine

## 2019-04-29 NOTE — PROGRESS NOTE ADULT - PROBLEM SELECTOR PLAN 3
Pt w/ acute altered mental status  -ammonia levels wnl. Pt received dialysis treatment today  -awaiting ABGs given pt has not been using bipap  -last dose of pain medications was at 5am. Would hold off. Consider tramadol x1 tonight instead of dilaudid Pt w/ acute altered mental status  -ammonia levels wnl. Pt received dialysis treatment today  -awaiting ABGs given pt has not been using bipap  -last dose of pain medications was at 5am. Would hold off. Consider tramadol x1 tonight instead of dilaudid  -Pt afebrile and wbc not elevated. Incision site looks c/d/i w/o drainage or pus making infectious less likely. Pt on appropriate abxs

## 2019-04-29 NOTE — PROGRESS NOTE ADULT - SUBJECTIVE AND OBJECTIVE BOX
CHIEF COMPLAINT:    SUBJECTIVE:   83 y/o male with ESRD on HD (MWF), HTN, RA on MTX and steroids, R leg amputation, severe CDI and megacolon, Severe PAD s/p LLE endarterectomy (4/1/19)with dr Clement presents with T 100.5 and DC from L groin.  Pt also c/o R arm pain/swelling where pt had fistulogram of the AC fistula on last admission.    4/29 Pt seen and examined at bedside. Pt a bit altered with some jerky movement prior to dialysis treatment. Pt states that he doesn't feel any pain. His last dilaudid was at 5am. Pt started having hemoptysis during the day  REVIEW OF SYSTEMS:  CONSTITUTIONAL: No weakness, fevers or chills  EYES/ENT: No visual changes;  No vertigo or throat pain   NECK: No pain or stiffness  RESPIRATORY: No cough, no wheezing,+ hemoptysis; No shortness of breath  CARDIOVASCULAR: No chest pain or palpitations  GASTROINTESTINAL: No abdominal or epigastric pain. No nausea, vomiting, or hematemesis; No diarrhea or constipation. No melena or hematochezia.  GENITOURINARY: No dysuria, frequency or hematuria  NEUROLOGICAL: No numbness or weakness  SKIN: No itching, burning, rashes, or lesions   All other review of systems is negative unless indicated above    Vital Signs Last 24 Hrs  T(C): 37.6 (29 Apr 2019 15:09), Max: 37.6 (29 Apr 2019 15:09)  T(F): 99.6 (29 Apr 2019 15:09), Max: 99.6 (29 Apr 2019 15:09)  HR: 114 (29 Apr 2019 16:30) (68 - 124)  BP: 121/43 (29 Apr 2019 16:30) (99/74 - 144/18)  BP(mean): 61 (29 Apr 2019 16:30) (42 - 120)  RR: 25 (29 Apr 2019 16:30) (15 - 26)  SpO2: 96% (29 Apr 2019 16:30) (94% - 100%)    I&O's Summary      CAPILLARY BLOOD GLUCOSE          PHYSICAL EXAM:  Constitutional: NAD, awake and alert, well-developed  HEENT: PERR, EOMI, Normal Hearing, MMM  Neck: Soft and supple, No LAD, No JVD  Respiratory: good air movement, bibasilar crackles, No wheezing, rales or rhonchi  Cardiovascular: S1 and S2, regular rate and rhythm, no Murmurs, gallops or rubs  Gastrointestinal: Bowel Sounds present, soft, nontender, nondistended, no guarding, no rebound  Extremities: No peripheral edema. R BKA, Left surgical site has staples and is clean dry and intact. No drainage or erythema  Vascular: 2+ peripheral pulses  Neurological: A/O x 3, no focal deficits  Skin:  LLE incision site clean and dry, + erythema, no drainage     MEDICATIONS:  MEDICATIONS  (STANDING):  allopurinol 100 milliGRAM(s) Oral <User Schedule>  aspirin  chewable 81 milliGRAM(s) Oral daily  atorvastatin 20 milliGRAM(s) Oral at bedtime  cefepime  Injectable. 1000 milliGRAM(s) IV Push daily  cholestyramine Powder (Sugar-Free) 4 Gram(s) Oral daily  DAPTOmycin IVPB      DAPTOmycin IVPB 290 milliGRAM(s) IV Intermittent every 48 hours  diltiazem    Tablet 60 milliGRAM(s) Oral four times a day  doxercalciferol Injectable 1 MICROGram(s) IV Push <User Schedule>  epoetin nelly Injectable 44326 Unit(s) IV Push <User Schedule>  finasteride 5 milliGRAM(s) Oral daily  lidocaine/prilocaine Cream 1 Application(s) Topical daily  pantoprazole    Tablet 40 milliGRAM(s) Oral before breakfast  predniSONE   Tablet 2.5 milliGRAM(s) Oral every other day  predniSONE   Tablet 5 milliGRAM(s) Oral every other day  sevelamer hydrochloride Oral Tab/Cap - Peds 800 milliGRAM(s) Oral three times a day with meals  silver sulfADIAZINE 1% Cream 1 Application(s) Topical daily  sodium chloride 0.9% lock flush 3 milliLiter(s) IV Push every 8 hours  vancomycin    Solution 125 milliGRAM(s) Oral every 6 hours      LABS: All Labs Reviewed:                        10.1   7.96  )-----------( 334      ( 29 Apr 2019 07:08 )             33.2     04-29    138  |  108  |  35<H>  ----------------------------<  95  4.4   |  22  |  4.61<H>    Ca    8.0<L>      29 Apr 2019 07:08  Phos  3.4     04-29    TPro  x   /  Alb  1.8<L>  /  TBili  x   /  DBili  x   /  AST  x   /  ALT  x   /  AlkPhos  x   04-29    PT/INR - ( 29 Apr 2019 07:08 )   PT: 39.9 sec;   INR: 3.46 ratio               Blood Culture:     RADIOLOGY/EKG:  < from: CT Chest No Cont (04.28.19 @ 12:17) >  IMPRESSION:   Continued increase size of tubular oblong opacity in the right middle   lobe, possibly endobronchial mass with impacted airways. Irregular   opacity in the posterior left upper lobe, measuring 1.4 cm. PET/CT is   recommended for further evaluation.    Left thyroid nodule, measuring 2.9 cm. Further evaluation with thyroid   ultrasound is recommended.    < end of copied text >      < from: Xray Chest 1 View- PORTABLE-Urgent (04.27.19 @ 15:51) >  Impression:    Right lower lobe opacity may represent pneumonia versus neoplasm versus   vascular artifact, recommend correlation with clinical symptoms and   short-term follow-up to resolution    < end of copied text >      < from: US Duplex Arterial Lower Ext Ltd, Left (04.19.19 @ 12:13) >  Impression:    Focal ultrasound of the left groin demonstrates no evidence of   pseudoaneurysm.    Hematoma anterior to the left common femoral artery measuring   approximately 3.2 x 0.9 x 5.0 cm.    There is plaque within the proximal left femoral artery, with an elevated   velocity of 110 cm/s.        < from: US Duplex Venous Upper Ext Ltd, Right (04.19.19 @ 12:16) >  IMPRESSION:     No evidence of right upper extremity deep venous thrombosis.  Markedly increased velocities within the patient's arteriovenous fistula   raising suspicious for hemodynamically significant stenosis.    DVT PPX:    ADVANCED DIRECTIVE:    DISPOSITION: CHIEF COMPLAINT:    #HPI- HPI:  85 y/o male with ESRD on HD (MWF), HTN, RA on MTX and steroids, R leg amputation, severe CDI and megacolon, Severe PAD s/p LLE endarterectomy 2 weeks ago prior to admission with dr clement presents with T 100 and DC from L groin.  Pt also c/o R arm pain/swelling where pt had fistulogram of the AC fistula on last admission.  RUL dopplers negative for DVT.  L groin sono--negative for pseudo aneurysm and + hematoma.        Pt d/w daughter regarding advance directives--Pt is FULL RESUSCITATION (19 Apr 2019 16:12)      SUBJECTIVE:   85 y/o male with ESRD on HD (MWF), HTN, RA on MTX and steroids, R leg amputation, severe CDI and megacolon, Severe PAD s/p LLE endarterectomy (4/1/19)with dr Clement presents with T 100.5 and DC from L groin.  Pt also c/o R arm pain/swelling where pt had fistulogram of the AC fistula on last admission.    4/29 Pt seen and examined at bedside. Pt a bit altered with some jerky movement prior to dialysis treatment. Pt states that he doesn't feel any pain. His last dilaudid was at 5am. Pt started having hemoptysis during the day  REVIEW OF SYSTEMS:  CONSTITUTIONAL: No weakness, fevers or chills  EYES/ENT: No visual changes;  No vertigo or throat pain   NECK: No pain or stiffness  RESPIRATORY: No cough, no wheezing,+ hemoptysis; No shortness of breath  CARDIOVASCULAR: No chest pain or palpitations  GASTROINTESTINAL: No abdominal or epigastric pain. No nausea, vomiting, or hematemesis; No diarrhea or constipation. No melena or hematochezia.  GENITOURINARY: No dysuria, frequency or hematuria  NEUROLOGICAL: No numbness or weakness  SKIN: No itching, burning, rashes, or lesions   All other review of systems is negative unless indicated above    Vital Signs Last 24 Hrs  T(C): 37.6 (29 Apr 2019 15:09), Max: 37.6 (29 Apr 2019 15:09)  T(F): 99.6 (29 Apr 2019 15:09), Max: 99.6 (29 Apr 2019 15:09)  HR: 114 (29 Apr 2019 16:30) (68 - 124)  BP: 121/43 (29 Apr 2019 16:30) (99/74 - 144/18)  BP(mean): 61 (29 Apr 2019 16:30) (42 - 120)  RR: 25 (29 Apr 2019 16:30) (15 - 26)  SpO2: 96% (29 Apr 2019 16:30) (94% - 100%)    I&O's Summary      CAPILLARY BLOOD GLUCOSE          PHYSICAL EXAM:  Constitutional: NAD, awake and alert, well-developed  HEENT: PERR, EOMI, Normal Hearing, MMM  Neck: Soft and supple, No LAD, No JVD  Respiratory: good air movement, bibasilar crackles, No wheezing, rales or rhonchi  Cardiovascular: S1 and S2, regular rate and rhythm, no Murmurs, gallops or rubs  Gastrointestinal: Bowel Sounds present, soft, nontender, nondistended, no guarding, no rebound  Extremities: No peripheral edema. R BKA, Left surgical site has staples and is clean dry and intact. No drainage or erythema  Vascular: 2+ peripheral pulses  Neurological: A/O x 3, no focal deficits  Skin:  LLE incision site clean and dry, + erythema, no drainage     MEDICATIONS:  MEDICATIONS  (STANDING):  allopurinol 100 milliGRAM(s) Oral <User Schedule>  aspirin  chewable 81 milliGRAM(s) Oral daily  atorvastatin 20 milliGRAM(s) Oral at bedtime  cefepime  Injectable. 1000 milliGRAM(s) IV Push daily  cholestyramine Powder (Sugar-Free) 4 Gram(s) Oral daily  DAPTOmycin IVPB      DAPTOmycin IVPB 290 milliGRAM(s) IV Intermittent every 48 hours  diltiazem    Tablet 60 milliGRAM(s) Oral four times a day  doxercalciferol Injectable 1 MICROGram(s) IV Push <User Schedule>  epoetin nelly Injectable 03761 Unit(s) IV Push <User Schedule>  finasteride 5 milliGRAM(s) Oral daily  lidocaine/prilocaine Cream 1 Application(s) Topical daily  pantoprazole    Tablet 40 milliGRAM(s) Oral before breakfast  predniSONE   Tablet 2.5 milliGRAM(s) Oral every other day  predniSONE   Tablet 5 milliGRAM(s) Oral every other day  sevelamer hydrochloride Oral Tab/Cap - Peds 800 milliGRAM(s) Oral three times a day with meals  silver sulfADIAZINE 1% Cream 1 Application(s) Topical daily  sodium chloride 0.9% lock flush 3 milliLiter(s) IV Push every 8 hours  vancomycin    Solution 125 milliGRAM(s) Oral every 6 hours      LABS: All Labs Reviewed:                        10.1   7.96  )-----------( 334      ( 29 Apr 2019 07:08 )             33.2     04-29    138  |  108  |  35<H>  ----------------------------<  95  4.4   |  22  |  4.61<H>    Ca    8.0<L>      29 Apr 2019 07:08  Phos  3.4     04-29    TPro  x   /  Alb  1.8<L>  /  TBili  x   /  DBili  x   /  AST  x   /  ALT  x   /  AlkPhos  x   04-29    PT/INR - ( 29 Apr 2019 07:08 )   PT: 39.9 sec;   INR: 3.46 ratio               Blood Culture:     RADIOLOGY/EKG:  < from: CT Chest No Cont (04.28.19 @ 12:17) >  IMPRESSION:   Continued increase size of tubular oblong opacity in the right middle   lobe, possibly endobronchial mass with impacted airways. Irregular   opacity in the posterior left upper lobe, measuring 1.4 cm. PET/CT is   recommended for further evaluation.    Left thyroid nodule, measuring 2.9 cm. Further evaluation with thyroid   ultrasound is recommended.    < end of copied text >      < from: Xray Chest 1 View- PORTABLE-Urgent (04.27.19 @ 15:51) >  Impression:    Right lower lobe opacity may represent pneumonia versus neoplasm versus   vascular artifact, recommend correlation with clinical symptoms and   short-term follow-up to resolution    < end of copied text >      < from: US Duplex Arterial Lower Ext Ltd, Left (04.19.19 @ 12:13) >  Impression:    Focal ultrasound of the left groin demonstrates no evidence of   pseudoaneurysm.    Hematoma anterior to the left common femoral artery measuring   approximately 3.2 x 0.9 x 5.0 cm.    There is plaque within the proximal left femoral artery, with an elevated   velocity of 110 cm/s.        < from: US Duplex Venous Upper Ext Ltd, Right (04.19.19 @ 12:16) >  IMPRESSION:     No evidence of right upper extremity deep venous thrombosis.  Markedly increased velocities within the patient's arteriovenous fistula   raising suspicious for hemodynamically significant stenosis.    DVT PPX:    ADVANCED DIRECTIVE:    DISPOSITION:

## 2019-04-29 NOTE — CONSULT NOTE ADULT - SUBJECTIVE AND OBJECTIVE BOX
Patient is a 84y old  Male who presents with a chief complaint of L groin DC (28 Apr 2019 17:14)      HPI:  85 y/o male with ESRD on HD (MWF), HTN, RA on MTX and steroids, R leg amputation, severe CDI and megacolon, Severe PAD s/p LLE endarterectomy 2 weeks ago with dr dugan presents with T 100 and DC from L groin.  Pt also c/o R arm pain/swelling where pt had fistulogram of the AC fistula on last admission.  RUL dopplers negative for DVT.  L groin sono--negative for pseudo aneurysm and + hematoma.  Pt given 2.1 L IVF, IV vanco/aztreonam in ED  On my exam in HD suite, awake, chronically ill appearing, NAD, daughter at bedside. Pt seen by Dr dugan in ED.  CXR--clear    TTE (10/18)--mild-mod MR/EF 60%    Pt d/w daughter regarding advance directives--Pt is FULL RESUSCITATION (19 Apr 2019 16:12)  events noted while admitted muscle flap and plastic surgery care developed increased cough with possible bloody sttreaked sputuim / none present now and RN at bedside, both noticed thhick yellow sputum production    PAST MEDICAL & SURGICAL HISTORY:  Above knee amputation of right lower extremity  Recurrent Clostridium difficile diarrhea  Diastolic CHF  Peripheral vascular disease  Afib  Anemia  CKD (chronic kidney disease)  COPD (chronic obstructive pulmonary disease)  SHAYY (obstructive sleep apnea)  Sepsis, due to unspecified organism: 2/2 poorly healing wounds b/l  Dyspepsia: On moderate exertion.  Sleep apnea, obstructive: Requires home 02 therapy, and treatment with BIPAP  Atelectasis  Pleural effusion, bilateral  Respiratory failure  Peripheral edema  CRI (chronic renal insufficiency)  Gout  Benign prostatic hypertrophy  Spinal stenosis  Hypercholesterolemia  GERD (gastroesophageal reflux disease)  CAD (coronary artery disease)  Hypertension  S/P angioplasty with stent  Cataract of left eye  Prostate: Surgery green light procedure.  S/P rotator cuff surgery: Right  S/P angioplasty      PREVIOUS DIAGNOSTIC TESTING:      MEDICATIONS  (STANDING):  allopurinol 100 milliGRAM(s) Oral <User Schedule>  aspirin  chewable 81 milliGRAM(s) Oral daily  atorvastatin 20 milliGRAM(s) Oral at bedtime  cefepime  Injectable. 1000 milliGRAM(s) IV Push daily  cholestyramine Powder (Sugar-Free) 4 Gram(s) Oral daily  DAPTOmycin IVPB      DAPTOmycin IVPB 290 milliGRAM(s) IV Intermittent every 48 hours  diltiazem    Tablet 60 milliGRAM(s) Oral four times a day  doxercalciferol Injectable 1 MICROGram(s) IV Push <User Schedule>  epoetin nelly Injectable 42923 Unit(s) IV Push <User Schedule>  finasteride 5 milliGRAM(s) Oral daily  lidocaine/prilocaine Cream 1 Application(s) Topical daily  pantoprazole    Tablet 40 milliGRAM(s) Oral before breakfast  predniSONE   Tablet 2.5 milliGRAM(s) Oral every other day  predniSONE   Tablet 5 milliGRAM(s) Oral every other day  sevelamer hydrochloride Oral Tab/Cap - Peds 800 milliGRAM(s) Oral three times a day with meals  silver sulfADIAZINE 1% Cream 1 Application(s) Topical daily  sodium chloride 0.9% lock flush 3 milliLiter(s) IV Push every 8 hours  vancomycin    Solution 125 milliGRAM(s) Oral every 6 hours    MEDICATIONS  (PRN):  acetaminophen   Tablet .. 650 milliGRAM(s) Oral every 6 hours PRN Temp greater or equal to 38.5C (101.3F)  acetaminophen   Tablet .. 650 milliGRAM(s) Oral every 6 hours PRN Mild Pain (1 - 3)  HYDROmorphone  Injectable 0.5 milliGRAM(s) IV Push every 4 hours PRN breakthrough pain  oxyCODONE    5 mG/acetaminophen 325 mG 2 Tablet(s) Oral every 6 hours PRN Moderate Pain (4 - 6)      FAMILY HISTORY:  Family history of colorectal cancer (Father)  Family history of diabetes mellitus (Mother, Sibling)  Family history of hypertension (Mother)      SOCIAL HISTORY:  ***    REVIEW OF SYSTEM:  Pertinent items are noted in HPI.  Constitutional negative for chills, fevers, sweats and weight loss  throat, and face:  negative for epistaxis, nasal congestion, sore throat and   tinnitus  Respiratory: negative for cough, dyspnea on exertion, pleuritic chest pain  and wheezing  Cardiovascular:  negative for chest pain, dyspnea and palpitations  Gastrointestinal: negative for abdominal pain, diarrhea, nausea and vomiting  Genitourinary: negative for dysuria, frequency and urinary incontinence  Skin:  negative for redness, rash, pruritus, swelling, dryness and   fissures  Hematologic/lymphatic: negative for bleeding and easy bruising  Musculoskeletal: negative for arthralgias, back pain and muscle weakness  Neurological: negative for dizziness, headaches, seizures and tremors  Behavioral/Psych:  negative for mood change, depression, suicidal attempts    Allergic/Immunologic: negative for anaphylaxis, angioedema and urticaria    Vital Signs Last 24 Hrs  T(C): 36.1 (29 Apr 2019 09:29), Max: 37 (29 Apr 2019 06:19)  T(F): 97 (29 Apr 2019 09:29), Max: 98.6 (29 Apr 2019 06:19)  HR: 68 (29 Apr 2019 08:00) (67 - 91)  BP: 110/23 (29 Apr 2019 08:00) (104/51 - 147/23)  BP(mean): 45 (29 Apr 2019 08:00) (40 - 120)  RR: 18 (29 Apr 2019 08:00) (15 - 23)  SpO2: 94% (29 Apr 2019 08:00) (94% - 100%)    I&O's Summary    PHYSICAL EXAM  General Appearance: cooperative, no acute distress,   HEENT: PERRL, conjunctiva clear, EOM's intact, non injected pharynx, no exudate, TM   normal  Neck: Supple, , no adenopathy, thyroid: not enlarged, no carotid bruit or JVD  Back: Symmetric, no  tenderness,no soft tissue tenderness  Lungs: Clear to auscultation bilateral,no adventitious breath sounds, normal   expiratory phase  Heart: Regular rate and rhythm, S1, S2 normal, no murmur, rub or gallop  Abdomen: Soft, non-tender, bowel sounds active , no hepatosplenomegaly  Extremities: no cyanosis or edema, no joint swelling, hx AKA right side  Skin: Skin color, texture normal, no rashes   Neurologic: Alert and oriented X3 , cranial nerves intact, sensory and motor normal,    ECG:    LABS:                          10.1   7.96  )-----------( 334      ( 29 Apr 2019 07:08 )             33.2     04-29    138  |  108  |  35<H>  ----------------------------<  95  4.4   |  22  |  4.61<H>    Ca    8.0<L>      29 Apr 2019 07:08              PT/INR - ( 29 Apr 2019 07:08 )   PT: 39.9 sec;   INR: 3.46 ratio                   RADIOLOGY & ADDITIONAL STUDIES:  < from: CT Chest No Cont (04.28.19 @ 12:17) >  PROCEDURE:   CT of the Chest was performed without intravenous contrast.  Sagittal and coronal reformats were performed.    FINDINGS:    LUNGS AND LARGE AIRWAYS: Emphysema. Continued increase size of tubular   oblong opacity in the right middle lobe, possibly endobronchial mass with   impacted airways. Possible associated broncholiths. Irregular opacity in   the posterior left upper lobe (series 3, image 60), measuring 1.4 cm.  PLEURA: Small left pleural effusion. Trace right pleural effusion.  VESSELS: Atherosclerotic calcifications.   HEART: Heart size is normal. No pericardial effusion.  MEDIASTINUM AND [place]: No lymphadenopathy.  CHEST WALL AND LOWER NECK: Left thyroid nodule, measuring 2.9 cm.   Extension of the thyroid gland into the superior mediastinum.   VISUALIZED UPPER ABDOMEN: Cholelithiasis. Unchanged probable cyst in   hepatic segment 8.  BONES: Degenerative changes of the spine.    IMPRESSION:   Continued increase size of tubular oblong opacity in the right middle   lobe, possibly endobronchial mass with impacted airways. Irregular   opacity in the posterior left upper lobe, measuring 1.4 cm. PET/CT is   recommended for further evaluation.    Left thyroid nodule, measuring 2.9 cm. Further evaluation with thyroid   ultrasound is recommended.    < end of copied text >

## 2019-04-29 NOTE — PROVIDER CONTACT NOTE (CRITICAL VALUE NOTIFICATION) - TEST AND RESULT REPORTED:
surgical swab culture/gram stain  1. from 4/23/19 final result rare coag (-) staphlococcus, rare enterococcus faecium (VRE), rare enterococcus faecalis(VRE)  2. from 4/23/19 resulted 4/28/19  final current: rare enterococcus faecium (VRE), rare coag (-) staphylococcus

## 2019-04-29 NOTE — PROGRESS NOTE ADULT - SUBJECTIVE AND OBJECTIVE BOX
Date of service: 04-29-19 @ 18:46    pt seen and examined  s/p debridement/muscle flap closure of L groin wound 4/23  afebrile, more confused this am  noted with episodes of hemopytsis/cough/sob    ROS: no fever or chills; denies dizziness, no HA, no abdominal pain, no diarrhea or constipation; no dysuria, no urinary frequency, no legs pain, no rashes    MEDICATIONS  (STANDING):  allopurinol 100 milliGRAM(s) Oral <User Schedule>  aspirin  chewable 81 milliGRAM(s) Oral daily  atorvastatin 20 milliGRAM(s) Oral at bedtime  cefepime  Injectable. 1000 milliGRAM(s) IV Push daily  cholestyramine Powder (Sugar-Free) 4 Gram(s) Oral daily  DAPTOmycin IVPB      DAPTOmycin IVPB 290 milliGRAM(s) IV Intermittent every 48 hours  diltiazem    Tablet 60 milliGRAM(s) Oral four times a day  doxercalciferol Injectable 1 MICROGram(s) IV Push <User Schedule>  epoetin nelly Injectable 99760 Unit(s) IV Push <User Schedule>  finasteride 5 milliGRAM(s) Oral daily  lidocaine/prilocaine Cream 1 Application(s) Topical daily  pantoprazole    Tablet 40 milliGRAM(s) Oral before breakfast  predniSONE   Tablet 2.5 milliGRAM(s) Oral every other day  predniSONE   Tablet 5 milliGRAM(s) Oral every other day  sevelamer hydrochloride Oral Tab/Cap - Peds 800 milliGRAM(s) Oral three times a day with meals  silver sulfADIAZINE 1% Cream 1 Application(s) Topical daily  sodium chloride 0.9% lock flush 3 milliLiter(s) IV Push every 8 hours  vancomycin    Solution 125 milliGRAM(s) Oral every 6 hours      Vital Signs Last 24 Hrs  T(C): 37.6 (29 Apr 2019 16:30), Max: 37.6 (29 Apr 2019 15:09)  T(F): 99.7 (29 Apr 2019 16:30), Max: 99.7 (29 Apr 2019 16:30)  HR: 110 (29 Apr 2019 18:00) (68 - 124)  BP: 116/67 (29 Apr 2019 18:00) (99/74 - 144/18)  BP(mean): 77 (29 Apr 2019 18:00) (42 - 120)  RR: 21 (29 Apr 2019 18:00) (15 - 26)  SpO2: 98% (29 Apr 2019 18:00) (94% - 100%)      PE:  Constitutional: frail looking  HEENT: NC/AT, EOMI, PERRLA, conjunctivae clear; ears and nose atraumatic; pharynx benign  Neck: supple; thyroid not palpable  Back: no tenderness  Respiratory: decreased breath sounds  Cardiovascular: S1S2 regular, no murmurs  Abdomen: soft, not tender, not distended, positive BS; liver and spleen WNL  Genitourinary: no suprapubic tenderness  Lymphatic: no LN palpable  Musculoskeletal: no muscle tenderness, no joint swelling or tenderness  Extremities: R fistula   Neurological/ Psychiatric:  moving all extremities  Skin: L groin  erythema, ecchymosis, staples intact, no drainage no palpable lesions, LLE wound    Labs: all available labs reviewed                                              10.1   7.96  )-----------( 334      ( 29 Apr 2019 07:08 )             33.2     04-29    138  |  108  |  35<H>  ----------------------------<  95  4.4   |  22  |  4.61<H>    Ca    8.0<L>      29 Apr 2019 07:08  Phos  3.4     04-29    TPro  x   /  Alb  1.8<L>  /  TBili  x   /  DBili  x   /  AST  x   /  ALT  x   /  AlkPhos  x   04-29        Culture - Surgical Swab (04.23.19 @ 15:08)    -  Cefazolin: R <=4    -  Trimethoprim/Sulfamethoxazole: R >2/38    -  Vancomycin: R >16    -  Vancomycin: S 4    -  RIF- Rifampin: S <=1 Should not be used as monotherapy    -  Tetra/Doxy: R >8    -  Tetra/Doxy: R >8    -  Oxacillin: R >2    -  Penicillin: R 8    -  Linezolid: S 2    -  Gentamicin: R >8 Should not be used as monotherapy    -  Levofloxacin: R >4    -  Erythromycin: R >4    -  Clindamycin: R >4    -  Daptomycin: S 4    -  Ampicillin: R >8 Predicts results to ampicillin/sulbactam, amoxacillin-clavulanate and  piperacillin-tazobactam.    -  Ampicillin/Sulbactam: R <=8/4    Specimen Source: .Surgical Swab left groin deep wound culture    Culture Results:   Rare Enterococcus faecium (vancomycin resistant)  Rare Coag Negative Staphylococcus    Organism Identification: Enterococcus faecium (vancomycin resistant)  Coag Negative Staphylococcus    Organism: Enterococcus faecium (vancomycin resistant)    Organism: Coag Negative Staphylococcus    Method Type: DAVIDSON    Method Type: DAVIDSON           Culture - Abscess with Gram Stain (04.21.19 @ 08:40)    -  Amikacin: S <=16    -  Levofloxacin: R >4    -  Levofloxacin: S <=2    -  Ciprofloxacin: S <=1    -  Daptomycin: S 2    -  Ceftriaxone: S <=1 Enterobacter, Citrobacter, and Serratia may develop resistance during prolonged therapy    -  Cefoxitin: S <=8    -  Cefepime: S <=4    -  Cefazolin: S <=8    -  Aztreonam: S <=4    -  Piperacillin/Tazobactam: S <=16    -  Tetra/Doxy: R >8    -  Linezolid: S 1    -  Ertapenem: S <=1    -  Gentamicin: S <=4    -  Tobramycin: S <=4    -  Meropenem: S <=1    -  Trimethoprim/Sulfamethoxazole: S <=2/38    -  Vancomycin: R >16    -  Ampicillin: R >8 Predicts results to ampicillin/sulbactam, amoxacillin-clavulanate and  piperacillin-tazobactam.    -  Ampicillin: S <=8 These ampicillin results predict results for amoxicillin    -  Ampicillin/Sulbactam: S <=8/4    Specimen Source: .Abscess Leg - Left    Culture Results:   Moderate Enterococcus faecium (vancomycin resistant)  Few Proteus mirabilis    Organism Identification: Enterococcus faecium (vancomycin resistant)  Proteus mirabilis    Organism: Enterococcus faecium (vancomycin resistant)    Organism: Proteus mirabilis    Method Type: DAVIDSON    Method Type: DAVIDSON      Culture - Abscess with Gram Stain (04.21.19 @ 08:40)    -  Trimethoprim/Sulfamethoxazole: S <=2/38    -  Amikacin: S <=16    -  Ampicillin: R >8 Predicts results to ampicillin/sulbactam, amoxacillin-clavulanate and  piperacillin-tazobactam.    -  Ampicillin: S <=8 These ampicillin results predict results for amoxicillin    -  Aztreonam: S <=4    -  Cefazolin: S <=8    -  Cefepime: S <=4    -  Cefoxitin: S <=8    -  Ciprofloxacin: S <=1    -  Ertapenem: S <=1    -  Daptomycin: S 2    -  Vancomycin: R >16    -  Ampicillin/Sulbactam: S <=8/4    -  Ceftriaxone: S <=1 Enterobacter, Citrobacter, and Serratia may develop resistance during prolonged therapy    -  Gentamicin: S <=4    -  Levofloxacin: R >4    -  Levofloxacin: S <=2    -  Linezolid: S 1    -  Piperacillin/Tazobactam: S <=16    -  Tetra/Doxy: R >8    -  Meropenem: S <=1    -  Tobramycin: S <=4    Specimen Source: .Abscess Leg - Left    Culture Results:   Moderate Enterococcus faecium (vancomycin resistant)  Few Proteus mirabilis    Organism Identification: Enterococcus faecium (vancomycin resistant)  Proteus mirabilis    Organism: Enterococcus faecium (vancomycin resistant)    Organism: Proteus mirabilis    Method Type: DAVIDSON    Method Type: DAVIDSON          Radiology: all available radiological tests reviewed  < from: CT Chest No Cont (04.28.19 @ 12:17) >  EXAM:  CT CHEST                            PROCEDURE DATE:  04/28/2019          INTERPRETATION:  CLINICAL INFORMATION: Hemoptysis, possible neoplasm.    COMPARISON: December 21, 2015. Correlation is made to CT abdomen and   pelvis from October 5, 2018.    PROCEDURE:   CT of the Chest was performed without intravenous contrast.  Sagittal and coronal reformats were performed.    FINDINGS:    LUNGS AND LARGE AIRWAYS: Emphysema. Continued increase size of tubular   oblong opacity in the right middle lobe, possibly endobronchial mass with   impacted airways. Possible associated broncholiths. Irregular opacity in   the posterior left upper lobe (series 3, image 60), measuring 1.4 cm.  PLEURA: Small left pleural effusion. Trace right pleural effusion.  VESSELS: Atherosclerotic calcifications.   HEART: Heart size is normal. No pericardial effusion.  MEDIASTINUM AND [place]: No lymphadenopathy.  CHEST WALL AND LOWER NECK: Left thyroid nodule, measuring 2.9 cm.   Extension of the thyroid gland into the superior mediastinum.   VISUALIZED UPPER ABDOMEN: Cholelithiasis. Unchanged probable cyst in   hepatic segment 8.  BONES: Degenerative changes of the spine.    IMPRESSION:   Continued increase size of tubular oblong opacity in the right middle   lobe, possibly endobronchial mass with impacted airways. Irregular   opacity in the posterior left upper lobe, measuring 1.4 cm. PET/CT is   recommended for further evaluation.    Left thyroid nodule, measuring 2.9 cm. Further evaluation with thyroid   ultrasound is recommended.      EXAM:  US DPLX UPR EXT VEINS LTD RT                              PROCEDURE DATE:  04/19/2019          INTERPRETATION:  CLINICAL INFORMATION: Right arm swelling    COMPARISON: None available.    TECHNIQUE: Duplex sonography of the RIGHT UPPER extremity with color and   spectral Doppler, with and without compression.      FINDINGS:    The right internal jugular, subclavian, axillary, brachial, basilic and   cephalic veins are patent and compressible where applicable.     Doppler examination shows normal spontaneous and phasic flow.    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  Markedly increased velocities within the patient's arteriovenous fistula   raising suspicious for hemodynamically significant stenosis.    < end of copied text >    Advanced directives addressed: full resuscitation

## 2019-04-29 NOTE — PROGRESS NOTE ADULT - ASSESSMENT
85 y/o male with ESRD on HD (MWF), HTN, RA on MTX and steroids, R leg amputation, severe CDI and megacolon, Severe PAD s/p LLE endarterectomy 2 weeks ago with dr dugan presents with T 100 and DC from L groin.  Pt also c/o R arm pain/swelling where pt had fistulogram of the AC fistula on last admission.  RUL dopplers negative for DVT.  L groin sono--negative for pseudo aneurysm and + hematoma.  Pt given 2.1 L IVF, IV vanco/aztreonam in ED  On my exam in HD suite, awake, chronically ill appearing, NAD, daughter at bedside. Pt seen by Dr dugan in ED.  CXR--clear    4/20  s/p hd yesterday  prolonged bleed from access stopped w pressure  feels well  arm less tender  received 1 u prbc on hd yesterday  monitor cbc    4/22 SY  --ESRD : HD order and tx reviewed.   Tolerating tx well.  --AVF : as above.  Prolonged bleeding post tx.  Venous pressure acceptable today.  Monitor AVF function.  --Anemia : with acute loss.  Active infection leading to LETICIA hyporesponsiveness.  Transfuse 2 units today.  --PAD : post Left Ileofemoral Endarterectomy : Monitor Left foot perfusion.  --ID ; Left Groin infection : Continue abtx.  For debridement in am.    4/23  as above  For HD Wednesday  groin wash out today    4/24 SY  --ESRD : HD order and tx reviewed.  Tolerating tx well.  AVF cannulated without difficulty.  --Left Groin wound --Post  Muscle Flap Coverage.  Continue abtx.  --PAD : monitor perfusion.  --ID : continue Cefepime.    4/25 SY  --ESRD : Continue TIW HD  --AVF : cannulated without difficulty.  RUE edema much improved.  --Left Groin wound : post Muscle Flap Coverage.   Continue abtx.  --ID: continue abtx.  --PAD : post Left Ileofemoral endarterectomy : monitor perfusion.    4/26  seen on hd  in good spirits   stable on hd  fluid removal reduced due to low BP  hgb stable    4/27 SY  --ESRD : Continue TIW HD  --AVF : functioning well.  --PAD : Post Left Ileofemoral Endarterectomy : monitor perfusion  --Left Groin Wound : post Muscle Flap Coverage : continue abtx and wound care.    4/28 SY  --ESRD : for HD in am  --AVF : functioning well with improved cannulation.  --PAD : monitor perfusion ( Post Left Ileofemoral Endarterectomy)  --Left Groin wound : post Muscle Flap : continue abtx and wound care.    4/29 MK   - ESRD: tolerting hd, AVF cannultion well  - inc sputum production with possible endobronchial mass: dr bush input noted, may need bronch  - AMS with more confusion and jerking motion: pain meds have been limited, will get ammonia and abg value, has not been using bipap during hospitalization  - [place]: fu h/h   - left groin wound s/p muscle flap coverage with s/p left ileofemoral endarterectomy

## 2019-04-30 LAB
ANION GAP SERPL CALC-SCNC: 7 MMOL/L — SIGNIFICANT CHANGE UP (ref 5–17)
BUN SERPL-MCNC: 19 MG/DL — SIGNIFICANT CHANGE UP (ref 7–23)
CALCIUM SERPL-MCNC: 8.3 MG/DL — LOW (ref 8.5–10.1)
CHLORIDE SERPL-SCNC: 111 MMOL/L — HIGH (ref 96–108)
CO2 SERPL-SCNC: 24 MMOL/L — SIGNIFICANT CHANGE UP (ref 22–31)
CREAT SERPL-MCNC: 2.7 MG/DL — HIGH (ref 0.5–1.3)
GLUCOSE SERPL-MCNC: 87 MG/DL — SIGNIFICANT CHANGE UP (ref 70–99)
HCT VFR BLD CALC: 32.1 % — LOW (ref 39–50)
HGB BLD-MCNC: 9.9 G/DL — LOW (ref 13–17)
INR BLD: 3.01 RATIO — HIGH (ref 0.88–1.16)
MCHC RBC-ENTMCNC: 30.8 GM/DL — LOW (ref 32–36)
MCHC RBC-ENTMCNC: 30.9 PG — SIGNIFICANT CHANGE UP (ref 27–34)
MCV RBC AUTO: 100.3 FL — HIGH (ref 80–100)
NRBC # BLD: 0 /100 WBCS — SIGNIFICANT CHANGE UP (ref 0–0)
PLATELET # BLD AUTO: 358 K/UL — SIGNIFICANT CHANGE UP (ref 150–400)
POTASSIUM SERPL-MCNC: 3.8 MMOL/L — SIGNIFICANT CHANGE UP (ref 3.5–5.3)
POTASSIUM SERPL-SCNC: 3.8 MMOL/L — SIGNIFICANT CHANGE UP (ref 3.5–5.3)
PROTHROM AB SERPL-ACNC: 34.6 SEC — HIGH (ref 10–12.9)
RBC # BLD: 3.2 M/UL — LOW (ref 4.2–5.8)
RBC # FLD: 21.2 % — HIGH (ref 10.3–14.5)
SODIUM SERPL-SCNC: 142 MMOL/L — SIGNIFICANT CHANGE UP (ref 135–145)
WBC # BLD: 13.16 K/UL — HIGH (ref 3.8–10.5)
WBC # FLD AUTO: 13.16 K/UL — HIGH (ref 3.8–10.5)

## 2019-04-30 PROCEDURE — 71045 X-RAY EXAM CHEST 1 VIEW: CPT | Mod: 26

## 2019-04-30 RX ADMIN — PANTOPRAZOLE SODIUM 40 MILLIGRAM(S): 20 TABLET, DELAYED RELEASE ORAL at 05:41

## 2019-04-30 RX ADMIN — FINASTERIDE 5 MILLIGRAM(S): 5 TABLET, FILM COATED ORAL at 13:23

## 2019-04-30 RX ADMIN — HYDROMORPHONE HYDROCHLORIDE 0.5 MILLIGRAM(S): 2 INJECTION INTRAMUSCULAR; INTRAVENOUS; SUBCUTANEOUS at 10:04

## 2019-04-30 RX ADMIN — Medication 125 MILLIGRAM(S): at 20:00

## 2019-04-30 RX ADMIN — CHOLESTYRAMINE 4 GRAM(S): 4 POWDER, FOR SUSPENSION ORAL at 08:42

## 2019-04-30 RX ADMIN — Medication 1 APPLICATION(S): at 13:24

## 2019-04-30 RX ADMIN — SEVELAMER CARBONATE 800 MILLIGRAM(S): 2400 POWDER, FOR SUSPENSION ORAL at 10:18

## 2019-04-30 RX ADMIN — HYDROMORPHONE HYDROCHLORIDE 0.5 MILLIGRAM(S): 2 INJECTION INTRAMUSCULAR; INTRAVENOUS; SUBCUTANEOUS at 16:45

## 2019-04-30 RX ADMIN — Medication 60 MILLIGRAM(S): at 05:40

## 2019-04-30 RX ADMIN — Medication 125 MILLIGRAM(S): at 13:23

## 2019-04-30 RX ADMIN — SODIUM CHLORIDE 3 MILLILITER(S): 9 INJECTION INTRAMUSCULAR; INTRAVENOUS; SUBCUTANEOUS at 16:14

## 2019-04-30 RX ADMIN — SODIUM CHLORIDE 3 MILLILITER(S): 9 INJECTION INTRAMUSCULAR; INTRAVENOUS; SUBCUTANEOUS at 22:58

## 2019-04-30 RX ADMIN — Medication 650 MILLIGRAM(S): at 22:57

## 2019-04-30 RX ADMIN — Medication 650 MILLIGRAM(S): at 09:30

## 2019-04-30 RX ADMIN — Medication 60 MILLIGRAM(S): at 13:23

## 2019-04-30 RX ADMIN — SEVELAMER CARBONATE 800 MILLIGRAM(S): 2400 POWDER, FOR SUSPENSION ORAL at 16:47

## 2019-04-30 RX ADMIN — CEFEPIME 1000 MILLIGRAM(S): 1 INJECTION, POWDER, FOR SOLUTION INTRAMUSCULAR; INTRAVENOUS at 13:23

## 2019-04-30 RX ADMIN — ATORVASTATIN CALCIUM 20 MILLIGRAM(S): 80 TABLET, FILM COATED ORAL at 22:57

## 2019-04-30 RX ADMIN — SODIUM CHLORIDE 3 MILLILITER(S): 9 INJECTION INTRAMUSCULAR; INTRAVENOUS; SUBCUTANEOUS at 05:16

## 2019-04-30 RX ADMIN — HYDROMORPHONE HYDROCHLORIDE 0.5 MILLIGRAM(S): 2 INJECTION INTRAMUSCULAR; INTRAVENOUS; SUBCUTANEOUS at 10:30

## 2019-04-30 RX ADMIN — Medication 125 MILLIGRAM(S): at 05:42

## 2019-04-30 RX ADMIN — Medication 100 MILLIGRAM(S): at 10:14

## 2019-04-30 RX ADMIN — Medication 125 MILLIGRAM(S): at 23:03

## 2019-04-30 RX ADMIN — Medication 5 MILLIGRAM(S): at 13:24

## 2019-04-30 RX ADMIN — Medication 650 MILLIGRAM(S): at 08:40

## 2019-04-30 RX ADMIN — HYDROMORPHONE HYDROCHLORIDE 0.5 MILLIGRAM(S): 2 INJECTION INTRAMUSCULAR; INTRAVENOUS; SUBCUTANEOUS at 15:25

## 2019-04-30 NOTE — PROGRESS NOTE ADULT - PROBLEM SELECTOR PLAN 3
Pt w/ acute altered mental status that is improving  -ammonia levels wnl.   -no hypercapnia on ABGs  -could be 2/2 dilaudid and percocet  -Pt afebrile. Incision site looks c/d/i w/o drainage or pus making infectious less likely. Pt on appropriate abxs

## 2019-04-30 NOTE — PROGRESS NOTE ADULT - SUBJECTIVE AND OBJECTIVE BOX
Patient is a 84y old  Male who presents with a chief complaint of L groin DC (28 Apr 2019 17:14)      HPI:  85 y/o male with ESRD on HD (MWF), HTN, RA on MTX and steroids, R leg amputation, severe CDI and megacolon, Severe PAD s/p LLE endarterectomy 2 weeks ago with dr dugan presents with T 100 and DC from L groin.  Pt also c/o R arm pain/swelling where pt had fistulogram of the AC fistula on last admission.  RUL dopplers negative for DVT.  L groin sono--negative for pseudo aneurysm and + hematoma.  Pt given 2.1 L IVF, IV vanco/aztreonam in ED  On my exam in HD suite, awake, chronically ill appearing, NAD, daughter at bedside. Pt seen by Dr dugan in ED.  CXR--clear    TTE (10/18)--mild-mod MR/EF 60%    Pt d/w daughter regarding advance directives--Pt is FULL RESUSCITATION (19 Apr 2019 16:12)  events noted while admitted muscle flap and plastic surgery care developed increased cough with possible bloody sttreaked sputuim / none present now and RN at bedside, both noticed thhick yellow sputum production    4/30  data discussed with RN at bedside  no cp  improved breathing  PAST MEDICAL & SURGICAL HISTORY:  Above knee amputation of right lower extremity  Recurrent Clostridium difficile diarrhea  Diastolic CHF  Peripheral vascular disease  Afib  Anemia  CKD (chronic kidney disease)  COPD (chronic obstructive pulmonary disease)  SHAYY (obstructive sleep apnea)  Sepsis, due to unspecified organism: 2/2 poorly healing wounds b/l  Dyspepsia: On moderate exertion.  Sleep apnea, obstructive: Requires home 02 therapy, and treatment with BIPAP  Atelectasis  Pleural effusion, bilateral  Respiratory failure  Peripheral edema  CRI (chronic renal insufficiency)  Gout  Benign prostatic hypertrophy  Spinal stenosis  Hypercholesterolemia  GERD (gastroesophageal reflux disease)  CAD (coronary artery disease)  Hypertension  S/P angioplasty with stent  Cataract of left eye  Prostate: Surgery green light procedure.  S/P rotator cuff surgery: Right  S/P angioplasty      PREVIOUS DIAGNOSTIC TESTING:      MEDICATIONS  (STANDING):  allopurinol 100 milliGRAM(s) Oral <User Schedule>  aspirin  chewable 81 milliGRAM(s) Oral daily  atorvastatin 20 milliGRAM(s) Oral at bedtime  cefepime  Injectable. 1000 milliGRAM(s) IV Push daily  cholestyramine Powder (Sugar-Free) 4 Gram(s) Oral daily  DAPTOmycin IVPB      DAPTOmycin IVPB 290 milliGRAM(s) IV Intermittent every 48 hours  diltiazem    Tablet 60 milliGRAM(s) Oral four times a day  doxercalciferol Injectable 1 MICROGram(s) IV Push <User Schedule>  epoetin nelly Injectable 54424 Unit(s) IV Push <User Schedule>  finasteride 5 milliGRAM(s) Oral daily  lidocaine/prilocaine Cream 1 Application(s) Topical daily  pantoprazole    Tablet 40 milliGRAM(s) Oral before breakfast  predniSONE   Tablet 2.5 milliGRAM(s) Oral every other day  predniSONE   Tablet 5 milliGRAM(s) Oral every other day  sevelamer hydrochloride Oral Tab/Cap - Peds 800 milliGRAM(s) Oral three times a day with meals  silver sulfADIAZINE 1% Cream 1 Application(s) Topical daily  sodium chloride 0.9% lock flush 3 milliLiter(s) IV Push every 8 hours  vancomycin    Solution 125 milliGRAM(s) Oral every 6 hours    MEDICATIONS  (PRN):  acetaminophen   Tablet .. 650 milliGRAM(s) Oral every 6 hours PRN Temp greater or equal to 38.5C (101.3F)  acetaminophen   Tablet .. 650 milliGRAM(s) Oral every 6 hours PRN Mild Pain (1 - 3)  HYDROmorphone  Injectable 0.5 milliGRAM(s) IV Push every 4 hours PRN breakthrough pain  oxyCODONE    5 mG/acetaminophen 325 mG 2 Tablet(s) Oral every 6 hours PRN Moderate Pain (4 - 6)      FAMILY HISTORY:  Family history of colorectal cancer (Father)  Family history of diabetes mellitus (Mother, Sibling)  Family history of hypertension (Mother)      SOCIAL HISTORY:  ***    REVIEW OF SYSTEM:  Pertinent items are noted in HPI.  Constitutional negative for chills, fevers, sweats and weight loss  throat, and face:  negative for epistaxis, nasal congestion, sore throat and   tinnitus  Respiratory: negative for cough, dyspnea on exertion, pleuritic chest pain  and wheezing  Cardiovascular:  negative for chest pain, dyspnea and palpitations  Gastrointestinal: negative for abdominal pain, diarrhea, nausea and vomiting  Genitourinary: negative for dysuria, frequency and urinary incontinence  Skin:  negative for redness, rash, pruritus, swelling, dryness and   fissures  Hematologic/lymphatic: negative for bleeding and easy bruising  Musculoskeletal: negative for arthralgias, back pain and muscle weakness  Neurological: negative for dizziness, headaches, seizures and tremors  Behavioral/Psych:  negative for mood change, depression, suicidal attempts    Allergic/Immunologic: negative for anaphylaxis, angioedema and urticaria    Vital Signs Last 24 Hrs  T(C): 36.5 (30 Apr 2019 05:47), Max: 37.6 (29 Apr 2019 15:09)  T(F): 97.7 (30 Apr 2019 05:47), Max: 99.7 (29 Apr 2019 16:30)  HR: 74 (30 Apr 2019 07:00) (74 - 124)  BP: 125/29 (30 Apr 2019 07:00) (99/74 - 139/114)  BP(mean): 49 (30 Apr 2019 07:00) (44 - 120)  RR: 22 (30 Apr 2019 07:00) (17 - 26)  SpO2: 100% (30 Apr 2019 07:00) (93% - 100%)    I&O's Summary    PHYSICAL EXAM  General Appearance: cooperative, no acute distress,   HEENT: PERRL, conjunctiva clear, EOM's intact, non injected pharynx, no exudate, TM   normal  Neck: Supple, , no adenopathy, thyroid: not enlarged, no carotid bruit or JVD  Back: Symmetric, no  tenderness,no soft tissue tenderness  Lungs: Clear to auscultation bilateral,no adventitious breath sounds, normal   expiratory phase  Heart: Regular rate and rhythm, S1, S2 normal, no murmur, rub or gallop  Abdomen: Soft, non-tender, bowel sounds active , no hepatosplenomegaly  Extremities: no cyanosis or edema, no joint swelling, hx AKA right side  Skin: Skin color, texture normal, no rashes   Neurologic: Alert and oriented X3 , cranial nerves intact, sensory and motor normal,    ECG:    LABS:                        9.9    13.16 )-----------( 358      ( 30 Apr 2019 06:08 )             32.1                           10.1   7.96  )-----------( 334      ( 29 Apr 2019 07:08 )             33.2     04-29    04-30    142  |  111<H>  |  19  ----------------------------<  87  3.8   |  24  |  2.70<H>    Ca    8.3<L>      30 Apr 2019 06:08  Phos  3.4     04-29    TPro  x   /  Alb  1.8<L>  /  TBili  x   /  DBili  x   /  AST  x   /  ALT  x   /  AlkPhos  x   04-29  138  |  108  |  35<H>  ----------------------------<  95  4.4   |  22  |  4.61<H>    Ca    8.0<L>      29 Apr 2019 07:08              PT/INR - ( 29 Apr 2019 07:08 )   PT: 39.9 sec;   INR: 3.46 ratio                   RADIOLOGY & ADDITIONAL STUDIES:  < from: CT Chest No Cont (04.28.19 @ 12:17) >  PROCEDURE:   CT of the Chest was performed without intravenous contrast.  Sagittal and coronal reformats were performed.    FINDINGS:    LUNGS AND LARGE AIRWAYS: Emphysema. Continued increase size of tubular   oblong opacity in the right middle lobe, possibly endobronchial mass with   impacted airways. Possible associated broncholiths. Irregular opacity in   the posterior left upper lobe (series 3, image 60), measuring 1.4 cm.  PLEURA: Small left pleural effusion. Trace right pleural effusion.  VESSELS: Atherosclerotic calcifications.   HEART: Heart size is normal. No pericardial effusion.  MEDIASTINUM AND [place]: No lymphadenopathy.  CHEST WALL AND LOWER NECK: Left thyroid nodule, measuring 2.9 cm.   Extension of the thyroid gland into the superior mediastinum.   VISUALIZED UPPER ABDOMEN: Cholelithiasis. Unchanged probable cyst in   hepatic segment 8.  BONES: Degenerative changes of the spine.    IMPRESSION:   Continued increase size of tubular oblong opacity in the right middle   lobe, possibly endobronchial mass with impacted airways. Irregular   opacity in the posterior left upper lobe, measuring 1.4 cm. PET/CT is   recommended for further evaluation.    Left thyroid nodule, measuring 2.9 cm. Further evaluation with thyroid   ultrasound is recommended.    < end of copied text >

## 2019-04-30 NOTE — PROGRESS NOTE ADULT - SUBJECTIVE AND OBJECTIVE BOX
CHIEF COMPLAINT:    SUBJECTIVE:   83 y/o male with ESRD on HD (MWF), HTN, RA on MTX and steroids, R leg amputation, severe CDI and megacolon, Severe PAD s/p LLE endarterectomy (4/1/19)with dr Clement presents with T 100.5 and DC from L groin.  Pt also c/o R arm pain/swelling where pt had fistulogram of the AC fistula on last admission.    4/30 pt seen and examined at bedside today. Pt is alert but a little agitated. Asked pt and family(daughter enio) about GOC with palliative and both stated that they would like to wait and speak with Dr. Flores about lung nodules.  REVIEW OF SYSTEMS:  CONSTITUTIONAL: No weakness, fevers or chills  EYES/ENT: No visual changes;  No vertigo or throat pain   NECK: No pain or stiffness  RESPIRATORY: No cough, no wheezing,+ hemoptysis; No shortness of breath  CARDIOVASCULAR: No chest pain or palpitations  GASTROINTESTINAL: No abdominal or epigastric pain. No nausea, vomiting, or hematemesis; No diarrhea or constipation. No melena or hematochezia.  GENITOURINARY: No dysuria, frequency or hematuria  NEUROLOGICAL: No numbness or weakness  SKIN: No itching, burning, rashes, or lesions   All other review of systems is negative unless indicated above    Vital Signs Last 24 Hrs  T(C): 37 (30 Apr 2019 16:23), Max: 37.4 (29 Apr 2019 22:19)  T(F): 98.6 (30 Apr 2019 16:23), Max: 99.4 (29 Apr 2019 22:19)  HR: 69 (30 Apr 2019 16:00) (69 - 100)  BP: 117/25 (30 Apr 2019 16:00) (101/40 - 139/114)  BP(mean): 47 (30 Apr 2019 16:00) (43 - 120)  RR: 16 (30 Apr 2019 16:00) (16 - 24)  SpO2: 98% (30 Apr 2019 16:00) (93% - 100%)    I&O's Summary    29 Apr 2019 07:01  -  30 Apr 2019 07:00  --------------------------------------------------------  IN: 170 mL / OUT: 0 mL / NET: 170 mL        CAPILLARY BLOOD GLUCOSE          PHYSICAL EXAM:  Constitutional: NAD, awake and alert, well-developed  HEENT: PERR, EOMI, Normal Hearing, MMM  Neck: Soft and supple, No LAD, No JVD  Respiratory: good air movement, bibasilar crackles, No wheezing, rales or rhonchi  Cardiovascular: S1 and S2, regular rate and rhythm, no Murmurs, gallops or rubs  Gastrointestinal: Bowel Sounds present, soft, nontender, nondistended, no guarding, no rebound  Extremities: No peripheral edema. R BKA, Left surgical site has staples and is clean dry and intact. No drainage or erythema  Vascular: 2+ peripheral pulses  Neurological: A/O x 3, no focal deficits  Skin:  LLE incision site clean and dry, no erythema, no drainage     MEDICATIONS:  MEDICATIONS  (STANDING):  allopurinol 100 milliGRAM(s) Oral <User Schedule>  aspirin  chewable 81 milliGRAM(s) Oral daily  atorvastatin 20 milliGRAM(s) Oral at bedtime  cefepime  Injectable. 1000 milliGRAM(s) IV Push daily  cholestyramine Powder (Sugar-Free) 4 Gram(s) Oral daily  DAPTOmycin IVPB      DAPTOmycin IVPB 290 milliGRAM(s) IV Intermittent every 48 hours  diltiazem    Tablet 60 milliGRAM(s) Oral four times a day  doxercalciferol Injectable 1 MICROGram(s) IV Push <User Schedule>  epoetin nelly Injectable 59535 Unit(s) IV Push <User Schedule>  finasteride 5 milliGRAM(s) Oral daily  lidocaine/prilocaine Cream 1 Application(s) Topical daily  pantoprazole    Tablet 40 milliGRAM(s) Oral before breakfast  predniSONE   Tablet 2.5 milliGRAM(s) Oral every other day  predniSONE   Tablet 5 milliGRAM(s) Oral every other day  sevelamer hydrochloride Oral Tab/Cap - Peds 800 milliGRAM(s) Oral three times a day with meals  silver sulfADIAZINE 1% Cream 1 Application(s) Topical daily  sodium chloride 0.9% lock flush 3 milliLiter(s) IV Push every 8 hours  vancomycin    Solution 125 milliGRAM(s) Oral every 6 hours      LABS: All Labs Reviewed:                        9.9    13.16 )-----------( 358      ( 30 Apr 2019 06:08 )             32.1     04-30    142  |  111<H>  |  19  ----------------------------<  87  3.8   |  24  |  2.70<H>    Ca    8.3<L>      30 Apr 2019 06:08  Phos  3.4     04-29    TPro  x   /  Alb  1.8<L>  /  TBili  x   /  DBili  x   /  AST  x   /  ALT  x   /  AlkPhos  x   04-29    PT/INR - ( 30 Apr 2019 06:08 )   PT: 34.6 sec;   INR: 3.01 ratio               Blood Culture:     RADIOLOGY/EKG:  < from: US Thyroid + Parathyroid (04.29.19 @ 17:06) >    IMPRESSION:     Dominant left thyroid nodule for which fine-needle aspiration could be   considered. Alternatively 6 month follow-up can be obtained.    < end of copied text >      < from: CT Chest No Cont (04.28.19 @ 12:17) >  IMPRESSION:   Continued increase size of tubular oblong opacity in the right middle   lobe, possibly endobronchial mass with impacted airways. Irregular   opacity in the posterior left upper lobe, measuring 1.4 cm. PET/CT is   recommended for further evaluation.    Left thyroid nodule, measuring 2.9 cm. Further evaluation with thyroid   ultrasound is recommended.    < end of copied text >      < from: Xray Chest 1 View- PORTABLE-Urgent (04.27.19 @ 15:51) >  Impression:    Right lower lobe opacity may represent pneumonia versus neoplasm versus   vascular artifact, recommend correlation with clinical symptoms and   short-term follow-up to resolution    < end of copied text >      < from: US Duplex Arterial Lower Ext Ltd, Left (04.19.19 @ 12:13) >  Impression:    Focal ultrasound of the left groin demonstrates no evidence of   pseudoaneurysm.    Hematoma anterior to the left common femoral artery measuring   approximately 3.2 x 0.9 x 5.0 cm.    There is plaque within the proximal left femoral artery, with an elevated   velocity of 110 cm/s.        < from: US Duplex Venous Upper Ext Ltd, Right (04.19.19 @ 12:16) >  IMPRESSION:     No evidence of right upper extremity deep venous thrombosis.  Markedly increased velocities within the patient's arteriovenous fistula   raising suspicious for hemodynamically significant stenosis.  DVT PPX:    ADVANCED DIRECTIVE:    DISPOSITION:

## 2019-04-30 NOTE — PROGRESS NOTE ADULT - ASSESSMENT
85 y/o male with ESRD on HD (MWF), HTN, RA on MTX and steroids, R leg amputation, severe CDI and megacolon, Severe PAD s/p LLE endarterectomy 2 weeks ago with dr dugan presents with T 100 and DC from L groin.  Pt also c/o R arm pain/swelling where pt had fistulogram of the AC fistula on last admission.  RUL dopplers negative for DVT.  L groin sono--negative for pseudo aneurysm and + hematoma.  Pt given 2.1 L IVF, IV vanco/aztreonam in ED  On my exam in HD suite, awake, chronically ill appearing, NAD, daughter at bedside. Pt seen by Dr dugan in ED.  CXR--clear    TTE (10/18)--mild-mod MR/EF 60%    Pt d/w daughter regarding advance directives--Pt is FULL RESUSCITATION (19 Apr 2019 16:12)  events noted while admitted muscle flap and plastic surgery care developed increased cough with possible bloody streaked sputuim / none present now and RN at bedside, both noticed thhick yellow sputum production  ct scan findings personally reveiwed / old films are not available on PACS at this time for comparison  Continued increase size of tubular oblong opacity in the right middle   lobe, possibly endobronchial mass with impacted airways. Irregular   opacity in the posterior left upper lobe, measuring 1.4 cm    assessment / plan  suspected pneumonia with mucus plugging RML  can not r/o endobronchial lesion  separate FERNANDO pulm nodule seen needs further eval  after antibiotic therapy  pt si not good candidate for bronch at this time  outpt PET / ct is reasonable only after course of antibiotics completed  ESRD on dilaysis now  hx Sepsis, due to unspecified organism: 2/2 poorly healing wounds b/l  Sleep apnea, obstructive: Requires home 02 therapy, and treatment with BIPAP  bibasilar atelectasis with associated small effusions      agree with iv cefepime / vanco  fu imaging  may need BRONCH if no improvement  also PET as outpt is reasonable and all needs to be further discussed,   with pt and HCP depending on his overall wishes to proceed with workup as outpt  nebulizer  incentive spirometry  may consider repeat ct scan after antibiotic course that would be easier to arrange than outpt PET scan given overall condition

## 2019-04-30 NOTE — PROGRESS NOTE ADULT - SUBJECTIVE AND OBJECTIVE BOX
NEPHROLOGY INTERVAL HPI/OVERNIGHT EVENTS:    Date of Service: 04-30-19 @ 09:29  4/30 SY  Events from yesterday reviewed.    Acute agitation and resp distress now improved.  Appearing very comfortable and appropriate this morning.    4/28 SY  Feeling fair.  No new complaints.    4/27 SY  Resting comfortably  No new complaints.  Reports much improved pain in Left groin and foot.    4/26  stable groin flap  in SSD  seen on hd  in good spirits   stable on hd  fluid removal reduced due to low BP  hgb stable    4/25 SY  Feeling fair.  Continues with pain in Left groin and foot.  Pain control tolerable.    4/24 SY  Post Muscle Flap coverage of Left groin wound.  Reports pain in left foot.  Ate breakfast well.  No SOB    4/23  feels much better today  arm less swollen  for OR later washout groin wound  HD in am    4/22 SY  Alert.  No acute distress.  Complains of soreness in Left foot.  Continued pain in Left Groin area.  Right Arm and Elbow pain improved.    HPI:  83 y/o male with ESRD on HD (MWF), HTN, RA on MTX and steroids, R leg amputation, severe CDI and megacolon, Severe PAD s/p LLE endarterectomy 2 weeks ago with dr dugan presents with T 100 and DC from L groin.  Pt also c/o R arm pain/swelling where pt had fistulogram of the AC fistula on last admission.  RUL dopplers negative for DVT.  L groin sono--negative for pseudo aneurysm and + hematoma.  Pt given 2.1 L IVF, IV vanco/aztreonam in ED  On my exam in HD suite, awake, chronically ill appearing, NAD, daughter at bedside. Pt seen by Dr dugan in ED.  CXR--clear    TTE (10/18)--mild-mod MR/EF 60%    Pt d/w daughter regarding advance directives--Pt is FULL RESUSCITATION (19 Apr 2019 16:12)      MEDICATIONS  (STANDING):  allopurinol 100 milliGRAM(s) Oral <User Schedule>  aspirin  chewable 81 milliGRAM(s) Oral daily  atorvastatin 20 milliGRAM(s) Oral at bedtime  cefepime  Injectable. 1000 milliGRAM(s) IV Push daily  cholestyramine Powder (Sugar-Free) 4 Gram(s) Oral daily  DAPTOmycin IVPB      DAPTOmycin IVPB 290 milliGRAM(s) IV Intermittent every 48 hours  diltiazem    Tablet 60 milliGRAM(s) Oral four times a day  doxercalciferol Injectable 1 MICROGram(s) IV Push <User Schedule>  epoetin nelly Injectable 44408 Unit(s) IV Push <User Schedule>  finasteride 5 milliGRAM(s) Oral daily  lidocaine/prilocaine Cream 1 Application(s) Topical daily  pantoprazole    Tablet 40 milliGRAM(s) Oral before breakfast  predniSONE   Tablet 2.5 milliGRAM(s) Oral every other day  predniSONE   Tablet 5 milliGRAM(s) Oral every other day  sevelamer hydrochloride Oral Tab/Cap - Peds 800 milliGRAM(s) Oral three times a day with meals  silver sulfADIAZINE 1% Cream 1 Application(s) Topical daily  sodium chloride 0.9% lock flush 3 milliLiter(s) IV Push every 8 hours  vancomycin    Solution 125 milliGRAM(s) Oral every 6 hours    MEDICATIONS  (PRN):  acetaminophen   Tablet .. 650 milliGRAM(s) Oral every 6 hours PRN Temp greater or equal to 38.5C (101.3F)  acetaminophen   Tablet .. 650 milliGRAM(s) Oral every 6 hours PRN Mild Pain (1 - 3)  ALBUTerol    90 MICROgram(s) HFA Inhaler 2 Puff(s) Inhalation every 6 hours PRN Shortness of Breath and/or Wheezing  guaiFENesin   Syrup  (Sugar-Free) 100 milliGRAM(s) Oral every 6 hours PRN Cough  HYDROmorphone  Injectable 0.5 milliGRAM(s) IV Push every 4 hours PRN breakthrough pain    Vital Signs Last 24 Hrs  T(C): 36.7 (30 Apr 2019 09:24), Max: 37.6 (29 Apr 2019 15:09)  T(F): 98.1 (30 Apr 2019 09:24), Max: 99.7 (29 Apr 2019 16:30)  HR: 74 (30 Apr 2019 07:00) (74 - 124)  BP: 125/29 (30 Apr 2019 07:00) (99/74 - 139/114)  BP(mean): 49 (30 Apr 2019 07:00) (44 - 120)  RR: 22 (30 Apr 2019 07:00) (17 - 26)  SpO2: 100% (30 Apr 2019 07:00) (93% - 100%)  Daily     Daily     04-29 @ 07:01  -  04-30 @ 07:00  --------------------------------------------------------  IN: 170 mL / OUT: 0 mL / NET: 170 mL    PHYSICAL EXAM:  Alert and appropriate  GENERAL: No distress  CHEST/LUNG: Occ rhonchi. good air entry  HEART: S1S2 RRR  ABDOMEN: soft  EXTREMITIES: no edema  SKIN:     LABS:                        9.9    13.16 )-----------( 358      ( 30 Apr 2019 06:08 )             32.1     04-30    142  |  111<H>  |  19  ----------------------------<  87  3.8   |  24  |  2.70<H>    Ca    8.3<L>      30 Apr 2019 06:08  Phos  3.4     04-29    TPro  x   /  Alb  1.8<L>  /  TBili  x   /  DBili  x   /  AST  x   /  ALT  x   /  AlkPhos  x   04-29    PT/INR - ( 30 Apr 2019 06:08 )   PT: 34.6 sec;   INR: 3.01 ratio             Intact PTH: 39 pg/mL (04-29 @ 11:15)    ABG - ( 29 Apr 2019 18:15 )  pH, Arterial: 7.35  pH, Blood: x     /  pCO2: 40    /  pO2: 35    / HCO3: 22    / Base Excess: -3.1  /  SaO2: 68                    RADIOLOGY & ADDITIONAL TESTS:

## 2019-04-30 NOTE — PROGRESS NOTE ADULT - ASSESSMENT
83 y/o male with ESRD on HD (MWF), HTN, RA on MTX and steroids, R leg amputation, severe CDI and megacolon, Severe PAD s/p LLE endarterectomy 2 weeks ago with dr dugan presents with T 100 and DC from L groin.  Pt also c/o R arm pain/swelling where pt had fistulogram of the AC fistula on last admission.  RUL dopplers negative for DVT.  L groin sono--negative for pseudo aneurysm and + hematoma, eval [facility] vascular surgery noted to have seropurulent drainage from L groin along medial flap and erythema along wound edges concerning for superimposed infection.  Pt given 2.1 L IVF, IV vanco/aztreonam in ED.     1. hemopytsis, RML opacity/pneumonia. FERNANDO pulmonary nodule. L groin wound infection/hematoma. s/p LLE endarterectomy. ESRD. PCN allergy  - pulm eval appreciated, CT chest reviewed  - plan for PET/bronchoscopy in future r/o malignancy  - s/p debridement/muscle flap closure 4/23 deep wound cx w VRE/CONS  -, on IV daptomycin #6 cpk wnl check weekly on therapy for above organism coverage   - on cefepime 1gm daily #8 for gnr resistant coverage for probable pna  - continue with abx coverage  - s/p aztreonam  #4, s/p vancomycin #6  - on c diff prophylaxis with oral vancomycin  - blood cx no growth  - monitor temps  - tolerating abx well so far; no side effects noted  - reason for abx use and side effects reviewed with patient  - supportive care  - f/u cbc    2. other issues - care per medicine

## 2019-04-30 NOTE — PROGRESS NOTE ADULT - ASSESSMENT
85 y/o male with ESRD on HD (MWF), HTN, RA on MTX and steroids, R leg amputation, severe CDI and megacolon, Severe PAD s/p LLE endarterectomy 2 weeks ago with dr dugan presents with T 100 and DC from L groin.  Pt also c/o R arm pain/swelling where pt had fistulogram of the AC fistula on last admission.  RUL dopplers negative for DVT.  L groin sono--negative for pseudo aneurysm and + hematoma.  Pt given 2.1 L IVF, IV vanco/aztreonam in ED  On my exam in HD suite, awake, chronically ill appearing, NAD, daughter at bedside. Pt seen by Dr dugan in ED.  CXR--clear    4/20  s/p hd yesterday  prolonged bleed from access stopped w pressure  feels well  arm less tender  received 1 u prbc on hd yesterday  monitor cbc    4/22 SY  --ESRD : HD order and tx reviewed.   Tolerating tx well.  --AVF : as above.  Prolonged bleeding post tx.  Venous pressure acceptable today.  Monitor AVF function.  --Anemia : with acute loss.  Active infection leading to LETICIA hyporesponsiveness.  Transfuse 2 units today.  --PAD : post Left Ileofemoral Endarterectomy : Monitor Left foot perfusion.  --ID ; Left Groin infection : Continue abtx.  For debridement in am.    4/23  as above  For HD Wednesday  groin wash out today    4/24 SY  --ESRD : HD order and tx reviewed.  Tolerating tx well.  AVF cannulated without difficulty.  --Left Groin wound --Post  Muscle Flap Coverage.  Continue abtx.  --PAD : monitor perfusion.  --ID : continue Cefepime.    4/25 SY  --ESRD : Continue TIW HD  --AVF : cannulated without difficulty.  RUE edema much improved.  --Left Groin wound : post Muscle Flap Coverage.   Continue abtx.  --ID: continue abtx.  --PAD : post Left Ileofemoral endarterectomy : monitor perfusion.    4/26  seen on hd  in good spirits   stable on hd  fluid removal reduced due to low BP  hgb stable    4/27 SY  --ESRD : Continue TIW HD  --AVF : functioning well.  --PAD : Post Left Ileofemoral Endarterectomy : monitor perfusion  --Left Groin Wound : post Muscle Flap Coverage : continue abtx and wound care.    4/28 SY  --ESRD : for HD in am  --AVF : functioning well with improved cannulation.  --PAD : monitor perfusion ( Post Left Ileofemoral Endarterectomy)  --Left Groin wound : post Muscle Flap : continue abtx and wound care.    4/29 MK   - ESRD: tolerting hd, AVF cannultion well  - inc sputum production with possible endobronchial mass: dr bush input noted, may need bronch  - AMS with more confusion and jerking motion: pain meds have been limited, will get ammonia and abg value, has not been using bipap during hospitalization  - Cutler: fu h/h   - left groin wound s/p muscle flap coverage with s/p left ileofemoral endarterectomy      4/30 SY  --ESRD : Continue TIW HD  --Pulm : Hemoptysis resolved.  New RML PNA and FERNANDO nodule   improved resp status . Now PNA with mucus plugging and new findings of lesion.  For outpt work up once clinically improved.  --AMS ;resolved and at baseline.  Attempt to minimize pain meds.  --PAD : Monitor Left groin and LE wound.  --ID : continue Cefepime and Daptomycin with po vanco for C diff prophylaxis.

## 2019-04-30 NOTE — PROGRESS NOTE ADULT - SUBJECTIVE AND OBJECTIVE BOX
Date of service: 04-30-19 @ 11:08    pt seen and examined  s/p debridement/muscle flap closure of L groin wound 4/23  afebrile feeling better   MS improving  no further hemopytsis     ROS: no fever or chills; denies dizziness, no HA, no abdominal pain, no diarrhea or constipation; no dysuria, no urinary frequency, no legs pain, no rashes      MEDICATIONS  (STANDING):  allopurinol 100 milliGRAM(s) Oral <User Schedule>  aspirin  chewable 81 milliGRAM(s) Oral daily  atorvastatin 20 milliGRAM(s) Oral at bedtime  cefepime  Injectable. 1000 milliGRAM(s) IV Push daily  cholestyramine Powder (Sugar-Free) 4 Gram(s) Oral daily  DAPTOmycin IVPB      DAPTOmycin IVPB 290 milliGRAM(s) IV Intermittent every 48 hours  diltiazem    Tablet 60 milliGRAM(s) Oral four times a day  doxercalciferol Injectable 1 MICROGram(s) IV Push <User Schedule>  epoetin nelly Injectable 41912 Unit(s) IV Push <User Schedule>  finasteride 5 milliGRAM(s) Oral daily  lidocaine/prilocaine Cream 1 Application(s) Topical daily  pantoprazole    Tablet 40 milliGRAM(s) Oral before breakfast  predniSONE   Tablet 2.5 milliGRAM(s) Oral every other day  predniSONE   Tablet 5 milliGRAM(s) Oral every other day  sevelamer hydrochloride Oral Tab/Cap - Peds 800 milliGRAM(s) Oral three times a day with meals  silver sulfADIAZINE 1% Cream 1 Application(s) Topical daily  sodium chloride 0.9% lock flush 3 milliLiter(s) IV Push every 8 hours  vancomycin    Solution 125 milliGRAM(s) Oral every 6 hours      Vital Signs Last 24 Hrs  T(C): 36.7 (30 Apr 2019 09:24), Max: 37.6 (29 Apr 2019 15:09)  T(F): 98.1 (30 Apr 2019 09:24), Max: 99.7 (29 Apr 2019 16:30)  HR: 81 (30 Apr 2019 10:15) (72 - 124)  BP: 105/36 (30 Apr 2019 10:15) (99/74 - 139/114)  BP(mean): 50 (30 Apr 2019 10:15) (44 - 120)  RR: 20 (30 Apr 2019 10:15) (17 - 26)  SpO2: 98% (30 Apr 2019 10:15) (93% - 100%)        PE:  Constitutional: frail looking  HEENT: NC/AT, EOMI, PERRLA, conjunctivae clear; ears and nose atraumatic; pharynx benign  Neck: supple; thyroid not palpable  Back: no tenderness  Respiratory: decreased breath sounds  Cardiovascular: S1S2 regular, no murmurs  Abdomen: soft, not tender, not distended, positive BS; liver and spleen WNL  Genitourinary: no suprapubic tenderness  Lymphatic: no LN palpable  Musculoskeletal: no muscle tenderness, no joint swelling or tenderness  Extremities: R fistula   Neurological/ Psychiatric:  moving all extremities  Skin: L groin  erythema, ecchymosis, staples intact, no drainage no palpable lesions, LLE wound    Labs: all available labs reviewed                                       9.9    13.16 )-----------( 358      ( 30 Apr 2019 06:08 )             32.1     04-30    142  |  111<H>  |  19  ----------------------------<  87  3.8   |  24  |  2.70<H>    Ca    8.3<L>      30 Apr 2019 06:08  Phos  3.4     04-29    TPro  x   /  Alb  1.8<L>  /  TBili  x   /  DBili  x   /  AST  x   /  ALT  x   /  AlkPhos  x   04-29           Culture - Surgical Swab (04.23.19 @ 15:08)    -  Cefazolin: R <=4    -  Trimethoprim/Sulfamethoxazole: R >2/38    -  Vancomycin: R >16    -  Vancomycin: S 4    -  RIF- Rifampin: S <=1 Should not be used as monotherapy    -  Tetra/Doxy: R >8    -  Tetra/Doxy: R >8    -  Oxacillin: R >2    -  Penicillin: R 8    -  Linezolid: S 2    -  Gentamicin: R >8 Should not be used as monotherapy    -  Levofloxacin: R >4    -  Erythromycin: R >4    -  Clindamycin: R >4    -  Daptomycin: S 4    -  Ampicillin: R >8 Predicts results to ampicillin/sulbactam, amoxacillin-clavulanate and  piperacillin-tazobactam.    -  Ampicillin/Sulbactam: R <=8/4    Specimen Source: .Surgical Swab left groin deep wound culture    Culture Results:   Rare Enterococcus faecium (vancomycin resistant)  Rare Coag Negative Staphylococcus    Organism Identification: Enterococcus faecium (vancomycin resistant)  Coag Negative Staphylococcus    Organism: Enterococcus faecium (vancomycin resistant)    Organism: Coag Negative Staphylococcus    Method Type: DAVIDSON    Method Type: DAVIDSON           Culture - Abscess with Gram Stain (04.21.19 @ 08:40)    -  Amikacin: S <=16    -  Levofloxacin: R >4    -  Levofloxacin: S <=2    -  Ciprofloxacin: S <=1    -  Daptomycin: S 2    -  Ceftriaxone: S <=1 Enterobacter, Citrobacter, and Serratia may develop resistance during prolonged therapy    -  Cefoxitin: S <=8    -  Cefepime: S <=4    -  Cefazolin: S <=8    -  Aztreonam: S <=4    -  Piperacillin/Tazobactam: S <=16    -  Tetra/Doxy: R >8    -  Linezolid: S 1    -  Ertapenem: S <=1    -  Gentamicin: S <=4    -  Tobramycin: S <=4    -  Meropenem: S <=1    -  Trimethoprim/Sulfamethoxazole: S <=2/38    -  Vancomycin: R >16    -  Ampicillin: R >8 Predicts results to ampicillin/sulbactam, amoxacillin-clavulanate and  piperacillin-tazobactam.    -  Ampicillin: S <=8 These ampicillin results predict results for amoxicillin    -  Ampicillin/Sulbactam: S <=8/4    Specimen Source: .Abscess Leg - Left    Culture Results:   Moderate Enterococcus faecium (vancomycin resistant)  Few Proteus mirabilis    Organism Identification: Enterococcus faecium (vancomycin resistant)  Proteus mirabilis    Organism: Enterococcus faecium (vancomycin resistant)    Organism: Proteus mirabilis    Method Type: DAVIDSON    Method Type: DAVIDSON      Culture - Abscess with Gram Stain (04.21.19 @ 08:40)    -  Trimethoprim/Sulfamethoxazole: S <=2/38    -  Amikacin: S <=16    -  Ampicillin: R >8 Predicts results to ampicillin/sulbactam, amoxacillin-clavulanate and  piperacillin-tazobactam.    -  Ampicillin: S <=8 These ampicillin results predict results for amoxicillin    -  Aztreonam: S <=4    -  Cefazolin: S <=8    -  Cefepime: S <=4    -  Cefoxitin: S <=8    -  Ciprofloxacin: S <=1    -  Ertapenem: S <=1    -  Daptomycin: S 2    -  Vancomycin: R >16    -  Ampicillin/Sulbactam: S <=8/4    -  Ceftriaxone: S <=1 Enterobacter, Citrobacter, and Serratia may develop resistance during prolonged therapy    -  Gentamicin: S <=4    -  Levofloxacin: R >4    -  Levofloxacin: S <=2    -  Linezolid: S 1    -  Piperacillin/Tazobactam: S <=16    -  Tetra/Doxy: R >8    -  Meropenem: S <=1    -  Tobramycin: S <=4    Specimen Source: .Abscess Leg - Left    Culture Results:   Moderate Enterococcus faecium (vancomycin resistant)  Few Proteus mirabilis    Organism Identification: Enterococcus faecium (vancomycin resistant)  Proteus mirabilis    Organism: Enterococcus faecium (vancomycin resistant)    Organism: Proteus mirabilis    Method Type: DAVIDSON    Method Type: DAVIDSON          Radiology: all available radiological tests reviewed  < from: CT Chest No Cont (04.28.19 @ 12:17) >  EXAM:  CT CHEST                            PROCEDURE DATE:  04/28/2019          INTERPRETATION:  CLINICAL INFORMATION: Hemoptysis, possible neoplasm.    COMPARISON: December 21, 2015. Correlation is made to CT abdomen and   pelvis from October 5, 2018.    PROCEDURE:   CT of the Chest was performed without intravenous contrast.  Sagittal and coronal reformats were performed.    FINDINGS:    LUNGS AND LARGE AIRWAYS: Emphysema. Continued increase size of tubular   oblong opacity in the right middle lobe, possibly endobronchial mass with   impacted airways. Possible associated broncholiths. Irregular opacity in   the posterior left upper lobe (series 3, image 60), measuring 1.4 cm.  PLEURA: Small left pleural effusion. Trace right pleural effusion.  VESSELS: Atherosclerotic calcifications.   HEART: Heart size is normal. No pericardial effusion.  MEDIASTINUM AND [place]: No lymphadenopathy.  CHEST WALL AND LOWER NECK: Left thyroid nodule, measuring 2.9 cm.   Extension of the thyroid gland into the superior mediastinum.   VISUALIZED UPPER ABDOMEN: Cholelithiasis. Unchanged probable cyst in   hepatic segment 8.  BONES: Degenerative changes of the spine.    IMPRESSION:   Continued increase size of tubular oblong opacity in the right middle   lobe, possibly endobronchial mass with impacted airways. Irregular   opacity in the posterior left upper lobe, measuring 1.4 cm. PET/CT is   recommended for further evaluation.    Left thyroid nodule, measuring 2.9 cm. Further evaluation with thyroid   ultrasound is recommended.      EXAM:  US DPLX UPR EXT VEINS LTD RT                              PROCEDURE DATE:  04/19/2019          INTERPRETATION:  CLINICAL INFORMATION: Right arm swelling    COMPARISON: None available.    TECHNIQUE: Duplex sonography of the RIGHT UPPER extremity with color and   spectral Doppler, with and without compression.      FINDINGS:    The right internal jugular, subclavian, axillary, brachial, basilic and   cephalic veins are patent and compressible where applicable.     Doppler examination shows normal spontaneous and phasic flow.    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  Markedly increased velocities within the patient's arteriovenous fistula   raising suspicious for hemodynamically significant stenosis.    < end of copied text >    Advanced directives addressed: full resuscitation

## 2019-04-30 NOTE — PROGRESS NOTE ADULT - PROBLEM SELECTOR PLAN 1
Pt who had an endarterectomy April 1st who developed an infection at entry site and fever of 100.5  -now afebrile  -dopplers of the LLE showed hematoma anterior to the left common femoral artery measuring approximately 3.2 x 0.9 x 5.0 cm  -Dr. Clement recommendation appreciated:Debridement done. States will monitor for a few more days  Pain control w/ dilaudid  s/p aztreonam  -continue cefepime Day #7, Vancomycin for c diff prophylaxis Day #9, Daptomycin added Day #5  -wound care  -wound cx growing E faecium. Vanc resistant  -ID consult appreciated Pt who had an endarterectomy April 1st who developed an infection at entry site and fever of 100.5  -now afebrile  -dopplers of the LLE showed hematoma anterior to the left common femoral artery measuring approximately 3.2 x 0.9 x 5.0 cm  -Dr. Clement recommendation appreciated:Debridement done. States will monitor for a few more days  Pain control w/ dilaudid  s/p aztreonam  -continue cefepime Day #8, Vancomycin for c diff prophylaxis Day #10, Daptomycin added Day #6  -wound care  -wound cx growing E faecium. Vanc resistant  -ID consult appreciated

## 2019-04-30 NOTE — PROGRESS NOTE ADULT - SUBJECTIVE AND OBJECTIVE BOX
Pt has been seen and examined with FP resident, resident supervised agree with a/p       Patient is a 84y old  Male who presents with a chief complaint of L groin DC (24 Apr 2019 11:51)      PHYSICAL EXAM:    general- comfortable   -rs-aeeb,b/l crackles present   -cvs-s1s2 normal   -p/a-soft,bs+          A/P    #Ct abx, supportive care, abx    #better today      #Discussed with Dr. Flanagan      #DVT pr

## 2019-05-01 LAB
ANION GAP SERPL CALC-SCNC: 9 MMOL/L — SIGNIFICANT CHANGE UP (ref 5–17)
BUN SERPL-MCNC: 28 MG/DL — HIGH (ref 7–23)
CALCIUM SERPL-MCNC: 8.2 MG/DL — LOW (ref 8.5–10.1)
CHLORIDE SERPL-SCNC: 108 MMOL/L — SIGNIFICANT CHANGE UP (ref 96–108)
CO2 SERPL-SCNC: 23 MMOL/L — SIGNIFICANT CHANGE UP (ref 22–31)
CREAT SERPL-MCNC: 3.72 MG/DL — HIGH (ref 0.5–1.3)
GLUCOSE SERPL-MCNC: 87 MG/DL — SIGNIFICANT CHANGE UP (ref 70–99)
HCT VFR BLD CALC: 32.1 % — LOW (ref 39–50)
HGB BLD-MCNC: 9.8 G/DL — LOW (ref 13–17)
INR BLD: 3.22 RATIO — HIGH (ref 0.88–1.16)
MCHC RBC-ENTMCNC: 30.5 GM/DL — LOW (ref 32–36)
MCHC RBC-ENTMCNC: 31 PG — SIGNIFICANT CHANGE UP (ref 27–34)
MCV RBC AUTO: 101.6 FL — HIGH (ref 80–100)
NRBC # BLD: 0 /100 WBCS — SIGNIFICANT CHANGE UP (ref 0–0)
PLATELET # BLD AUTO: 443 K/UL — HIGH (ref 150–400)
POTASSIUM SERPL-MCNC: 4.2 MMOL/L — SIGNIFICANT CHANGE UP (ref 3.5–5.3)
POTASSIUM SERPL-SCNC: 4.2 MMOL/L — SIGNIFICANT CHANGE UP (ref 3.5–5.3)
PROTHROM AB SERPL-ACNC: 37.1 SEC — HIGH (ref 10–12.9)
RBC # BLD: 3.16 M/UL — LOW (ref 4.2–5.8)
RBC # FLD: 21.1 % — HIGH (ref 10.3–14.5)
SODIUM SERPL-SCNC: 140 MMOL/L — SIGNIFICANT CHANGE UP (ref 135–145)
WBC # BLD: 16.81 K/UL — HIGH (ref 3.8–10.5)
WBC # FLD AUTO: 16.81 K/UL — HIGH (ref 3.8–10.5)

## 2019-05-01 RX ADMIN — HYDROMORPHONE HYDROCHLORIDE 0.5 MILLIGRAM(S): 2 INJECTION INTRAMUSCULAR; INTRAVENOUS; SUBCUTANEOUS at 22:42

## 2019-05-01 RX ADMIN — HYDROMORPHONE HYDROCHLORIDE 0.5 MILLIGRAM(S): 2 INJECTION INTRAMUSCULAR; INTRAVENOUS; SUBCUTANEOUS at 19:01

## 2019-05-01 RX ADMIN — Medication 125 MILLIGRAM(S): at 15:59

## 2019-05-01 RX ADMIN — Medication 1 APPLICATION(S): at 16:08

## 2019-05-01 RX ADMIN — Medication 60 MILLIGRAM(S): at 17:46

## 2019-05-01 RX ADMIN — CEFEPIME 1000 MILLIGRAM(S): 1 INJECTION, POWDER, FOR SOLUTION INTRAMUSCULAR; INTRAVENOUS at 15:57

## 2019-05-01 RX ADMIN — HYDROMORPHONE HYDROCHLORIDE 0.5 MILLIGRAM(S): 2 INJECTION INTRAMUSCULAR; INTRAVENOUS; SUBCUTANEOUS at 20:46

## 2019-05-01 RX ADMIN — ATORVASTATIN CALCIUM 20 MILLIGRAM(S): 80 TABLET, FILM COATED ORAL at 22:42

## 2019-05-01 RX ADMIN — Medication 125 MILLIGRAM(S): at 17:46

## 2019-05-01 RX ADMIN — SEVELAMER CARBONATE 800 MILLIGRAM(S): 2400 POWDER, FOR SUSPENSION ORAL at 17:46

## 2019-05-01 RX ADMIN — Medication 650 MILLIGRAM(S): at 13:36

## 2019-05-01 RX ADMIN — DAPTOMYCIN 111.6 MILLIGRAM(S): 500 INJECTION, POWDER, LYOPHILIZED, FOR SOLUTION INTRAVENOUS at 15:56

## 2019-05-01 RX ADMIN — HYDROMORPHONE HYDROCHLORIDE 0.5 MILLIGRAM(S): 2 INJECTION INTRAMUSCULAR; INTRAVENOUS; SUBCUTANEOUS at 22:43

## 2019-05-01 RX ADMIN — Medication 650 MILLIGRAM(S): at 05:55

## 2019-05-01 RX ADMIN — SODIUM CHLORIDE 3 MILLILITER(S): 9 INJECTION INTRAMUSCULAR; INTRAVENOUS; SUBCUTANEOUS at 05:58

## 2019-05-01 RX ADMIN — CHOLESTYRAMINE 4 GRAM(S): 4 POWDER, FOR SUSPENSION ORAL at 08:50

## 2019-05-01 RX ADMIN — Medication 60 MILLIGRAM(S): at 15:57

## 2019-05-01 RX ADMIN — LIDOCAINE AND PRILOCAINE CREAM 1 APPLICATION(S): 25; 25 CREAM TOPICAL at 09:00

## 2019-05-01 RX ADMIN — Medication 125 MILLIGRAM(S): at 23:04

## 2019-05-01 RX ADMIN — ERYTHROPOIETIN 10000 UNIT(S): 10000 INJECTION, SOLUTION INTRAVENOUS; SUBCUTANEOUS at 13:10

## 2019-05-01 RX ADMIN — SODIUM CHLORIDE 3 MILLILITER(S): 9 INJECTION INTRAMUSCULAR; INTRAVENOUS; SUBCUTANEOUS at 15:53

## 2019-05-01 RX ADMIN — Medication 650 MILLIGRAM(S): at 03:41

## 2019-05-01 RX ADMIN — SODIUM CHLORIDE 3 MILLILITER(S): 9 INJECTION INTRAMUSCULAR; INTRAVENOUS; SUBCUTANEOUS at 22:36

## 2019-05-01 RX ADMIN — PANTOPRAZOLE SODIUM 40 MILLIGRAM(S): 20 TABLET, DELAYED RELEASE ORAL at 05:58

## 2019-05-01 RX ADMIN — Medication 125 MILLIGRAM(S): at 05:55

## 2019-05-01 RX ADMIN — Medication 60 MILLIGRAM(S): at 23:04

## 2019-05-01 RX ADMIN — Medication 2.5 MILLIGRAM(S): at 15:58

## 2019-05-01 RX ADMIN — Medication 60 MILLIGRAM(S): at 05:55

## 2019-05-01 RX ADMIN — DOXERCALCIFEROL 1 MICROGRAM(S): 2.5 CAPSULE ORAL at 13:11

## 2019-05-01 RX ADMIN — FINASTERIDE 5 MILLIGRAM(S): 5 TABLET, FILM COATED ORAL at 15:59

## 2019-05-01 RX ADMIN — Medication 81 MILLIGRAM(S): at 15:55

## 2019-05-01 RX ADMIN — SEVELAMER CARBONATE 800 MILLIGRAM(S): 2400 POWDER, FOR SUSPENSION ORAL at 16:07

## 2019-05-01 NOTE — PROGRESS NOTE ADULT - PROBLEM SELECTOR PLAN 1
Pt who had an endarterectomy April 1st who developed an infection at entry site and fever of 100.5  -now afebrile  -dopplers of the LLE showed hematoma anterior to the left common femoral artery measuring approximately 3.2 x 0.9 x 5.0 cm  -Dr. Clement recommendation appreciated:Debridement done.   Pain control w/ percocet. Hold dilaudid can make drowsy. Only when absolutely necessary.  s/p aztreonam  -continue cefepime Day #9, Vancomycin for c diff prophylaxis Day #11, Daptomycin added Day #7  -wound care  -wound cx growing E faecium. Vanc resistant  -ID consult appreciated  -WBC increasing will follow up with ID about coverage.   -wound site not showing signs of infection. Possibly 2/2 from lung mass

## 2019-05-01 NOTE — PROGRESS NOTE ADULT - SUBJECTIVE AND OBJECTIVE BOX
NEPHROLOGY INTERVAL HPI/OVERNIGHT EVENTS:    Date of Service: 19 @ 09:55   SY  Feeling well today.  No distress noted.     SY  Events from yesterday reviewed.    Acute agitation and resp distress now improved.  Appearing very comfortable and appropriate this morning.     SY  Feeling fair.  No new complaints.     SY  Resting comfortably  No new complaints.  Reports much improved pain in Left groin and foot.      stable groin flap  in SSD  seen on hd  in good spirits   stable on hd  fluid removal reduced due to low BP  hgb stable     SY  Feeling fair.  Continues with pain in Left groin and foot.  Pain control tolerable.     SY  Post Muscle Flap coverage of Left groin wound.  Reports pain in left foot.  Ate breakfast well.  No SOB      feels much better today  arm less swollen  for OR later washout groin wound  HD in am     SY  Alert.  No acute distress.  Complains of soreness in Left foot.  Continued pain in Left Groin area.  Right Arm and Elbow pain improved.    HPI:  85 y/o male with ESRD on HD (MWF), HTN, RA on MTX and steroids, R leg amputation, severe CDI and megacolon, Severe PAD s/p LLE endarterectomy 2 weeks ago with dr dugan presents with T 100 and DC from L groin.  Pt also c/o R arm pain/swelling where pt had fistulogram of the AC fistula on last admission.  RUL dopplers negative for DVT.  L groin sono--negative for pseudo aneurysm and + hematoma.  Pt given 2.1 L IVF, IV vanco/aztreonam in ED  On my exam in HD suite, awake, chronically ill appearing, NAD, daughter at bedside. Pt seen by Dr dugan in ED.  CXR--clear    TTE (10/18)--mild-mod MR/EF 60%    Pt d/w daughter regarding advance directives--Pt is FULL RESUSCITATION (2019 16:12)    MEDICATIONS  (STANDING):  allopurinol 100 milliGRAM(s) Oral <User Schedule>  aspirin  chewable 81 milliGRAM(s) Oral daily  atorvastatin 20 milliGRAM(s) Oral at bedtime  cefepime  Injectable. 1000 milliGRAM(s) IV Push daily  cholestyramine Powder (Sugar-Free) 4 Gram(s) Oral daily  DAPTOmycin IVPB      DAPTOmycin IVPB 290 milliGRAM(s) IV Intermittent every 48 hours  diltiazem    Tablet 60 milliGRAM(s) Oral four times a day  doxercalciferol Injectable 1 MICROGram(s) IV Push <User Schedule>  epoetin nelly Injectable 98891 Unit(s) IV Push <User Schedule>  finasteride 5 milliGRAM(s) Oral daily  lidocaine/prilocaine Cream 1 Application(s) Topical daily  pantoprazole    Tablet 40 milliGRAM(s) Oral before breakfast  predniSONE   Tablet 2.5 milliGRAM(s) Oral every other day  predniSONE   Tablet 5 milliGRAM(s) Oral every other day  sevelamer hydrochloride Oral Tab/Cap - Peds 800 milliGRAM(s) Oral three times a day with meals  silver sulfADIAZINE 1% Cream 1 Application(s) Topical daily  sodium chloride 0.9% lock flush 3 milliLiter(s) IV Push every 8 hours  vancomycin    Solution 125 milliGRAM(s) Oral every 6 hours    MEDICATIONS  (PRN):  acetaminophen   Tablet .. 650 milliGRAM(s) Oral every 6 hours PRN Temp greater or equal to 38.5C (101.3F)  acetaminophen   Tablet .. 650 milliGRAM(s) Oral every 6 hours PRN Mild Pain (1 - 3)  ALBUTerol    90 MICROgram(s) HFA Inhaler 2 Puff(s) Inhalation every 6 hours PRN Shortness of Breath and/or Wheezing  guaiFENesin   Syrup  (Sugar-Free) 100 milliGRAM(s) Oral every 6 hours PRN Cough  HYDROmorphone  Injectable 0.5 milliGRAM(s) IV Push every 4 hours PRN breakthrough pain    Vital Signs Last 24 Hrs  T(C): 37.1 (01 May 2019 05:00), Max: 37.1 (01 May 2019 05:00)  T(F): 98.7 (01 May 2019 05:00), Max: 98.7 (01 May 2019 05:00)  HR: 78 (01 May 2019 06:00) (69 - 83)  BP: 129/23 (01 May 2019 06:00) (100/71 - 135/42)  BP(mean): 45 (01 May 2019 06:00) (43 - 77)  RR: 19 (01 May 2019 06:00) (16 - 23)  SpO2: 97% (01 May 2019 06:00) (96% - 99%)  Daily     Daily Weight in k (01 May 2019 05:00)    04-30 @ 07:01  -  05- @ 07:00  --------------------------------------------------------  IN: 480 mL / OUT: 0 mL / NET: 480 mL    PHYSICAL EXAM:  Alert and comfortable  GENERAL: No distress  CHEST/LUNG: Fair air entry  HEART: S1S2 RRR  ABDOMEN: soft  EXTREMITIES: trace edema  SKIN:     LABS:                        9.8    16.81 )-----------( 443      ( 01 May 2019 06:35 )             32.1     05    140  |  108  |  28<H>  ----------------------------<  87  4.2   |  23  |  3.72<H>    Ca    8.2<L>      01 May 2019 06:35      PT/INR - ( 01 May 2019 06:35 )   PT: 37.1 sec;   INR: 3.22 ratio               ABG - ( 2019 18:15 )  pH, Arterial: 7.35  pH, Blood: x     /  pCO2: 40    /  pO2: 35    / HCO3: 22    / Base Excess: -3.1  /  SaO2: 68                    RADIOLOGY & ADDITIONAL TESTS:

## 2019-05-01 NOTE — PROGRESS NOTE ADULT - ASSESSMENT
83 y/o male with ESRD on HD (MWF), HTN, RA on MTX and steroids, R leg amputation, severe CDI and megacolon, Severe PAD s/p LLE endarterectomy 2 weeks ago with dr dugan presents with T 100 and DC from L groin.  Pt also c/o R arm pain/swelling where pt had fistulogram of the AC fistula on last admission.  RUL dopplers negative for DVT.  L groin sono--negative for pseudo aneurysm and + hematoma.  Pt given 2.1 L IVF, IV vanco/aztreonam in ED  On my exam in HD suite, awake, chronically ill appearing, NAD, daughter at bedside. Pt seen by Dr dugan in ED.  CXR--clear    TTE (10/18)--mild-mod MR/EF 60%    Pt d/w daughter regarding advance directives--Pt is FULL RESUSCITATION (19 Apr 2019 16:12)  events noted while admitted muscle flap and plastic surgery care developed increased cough with possible bloody streaked sputuim / none present now and RN at bedside, both noticed thhick yellow sputum production  ct scan findings personally reveiwed / old films are not available on PACS at this time for comparison  Continued increase size of tubular oblong opacity in the right middle   lobe, possibly endobronchial mass with impacted airways. Irregular   opacity in the posterior left upper lobe, measuring 1.4 cm    assessment / plan  suspected pneumonia with mucus plugging RML  can not r/o endobronchial lesion  separate FERNANDO pulm nodule seen needs further eval  after antibiotic therapy  pt si not good candidate for bronch at this time  outpt PET / ct is reasonable only after course of antibiotics completed  ESRD on dilaysis now  hx Sepsis, due to unspecified organism: 2/2 poorly healing wounds b/l  Sleep apnea, obstructive: Requires home 02 therapy, and treatment with BIPAP  bibasilar atelectasis with associated small effusions      agree with iv cefepime / vanco  fu imaging  may need BRONCH if no improvement  also PET as outpt is reasonable and all needs to be further discussed,   with pt and HCP depending on his overall wishes to proceed with workup as outpt  nebulizer  incentive spirometry  may consider repeat ct scan after antibiotic course that would be easier to arrange than outpt PET scan given overall condition  data discussed with pt's Anamika COMER  [phone]    prior films reviewed with ct scan abd/pelvis done 7/2018 and 10/2018 with similar RLL findings NOW increased in size which raises suspicion for malignancy higher with this finding with possible endobronchial mass also present / all discussed with his DTR / may need BRONCH for diagnostic purposes / will have  eval with Dr Arnold

## 2019-05-01 NOTE — PROGRESS NOTE ADULT - PROBLEM SELECTOR PLAN 2
Pt w/ hemoptysis likely 2/2 from lung mass  -chest xray as above: PNA vs neoplasm vs artifact  -CT scan of chest as above  -Thyroid US results as above. Needs FNA  -pulmonary consult appreciated PET scan outpatient and bronch after antibiotics Pt w/ hemoptysis likely 2/2 from lung mass  -chest xray as above: PNA vs neoplasm vs artifact  -CT scan of chest as above  -Thyroid US results as above. Needs FNA. Can be outpatient  -pulmonary consult appreciated PET scan outpatient and bronch after antibiotics. Recommends thoracic surgery evaluation for possible biopsy

## 2019-05-01 NOTE — PROGRESS NOTE ADULT - ASSESSMENT
85 y/o male with ESRD on HD (MWF), HTN, RA on MTX and steroids, R leg amputation, severe CDI and megacolon, Severe PAD s/p LLE endarterectomy 2 weeks ago with dr dugan presents with T 100 and DC from L groin.  Pt also c/o R arm pain/swelling where pt had fistulogram of the AC fistula on last admission.  RUL dopplers negative for DVT.  L groin sono--negative for pseudo aneurysm and + hematoma.  Pt given 2.1 L IVF, IV vanco/aztreonam in ED  On my exam in HD suite, awake, chronically ill appearing, NAD, daughter at bedside. Pt seen by Dr dugan in ED.  CXR--clear    4/20  s/p hd yesterday  prolonged bleed from access stopped w pressure  feels well  arm less tender  received 1 u prbc on hd yesterday  monitor cbc    4/22 SY  --ESRD : HD order and tx reviewed.   Tolerating tx well.  --AVF : as above.  Prolonged bleeding post tx.  Venous pressure acceptable today.  Monitor AVF function.  --Anemia : with acute loss.  Active infection leading to LETICIA hyporesponsiveness.  Transfuse 2 units today.  --PAD : post Left Ileofemoral Endarterectomy : Monitor Left foot perfusion.  --ID ; Left Groin infection : Continue abtx.  For debridement in am.    4/23  as above  For HD Wednesday  groin wash out today    4/24 SY  --ESRD : HD order and tx reviewed.  Tolerating tx well.  AVF cannulated without difficulty.  --Left Groin wound --Post  Muscle Flap Coverage.  Continue abtx.  --PAD : monitor perfusion.  --ID : continue Cefepime.    4/25 SY  --ESRD : Continue TIW HD  --AVF : cannulated without difficulty.  RUE edema much improved.  --Left Groin wound : post Muscle Flap Coverage.   Continue abtx.  --ID: continue abtx.  --PAD : post Left Ileofemoral endarterectomy : monitor perfusion.    4/26  seen on hd  in good spirits   stable on hd  fluid removal reduced due to low BP  hgb stable    4/27 SY  --ESRD : Continue TIW HD  --AVF : functioning well.  --PAD : Post Left Ileofemoral Endarterectomy : monitor perfusion  --Left Groin Wound : post Muscle Flap Coverage : continue abtx and wound care.    4/28 SY  --ESRD : for HD in am  --AVF : functioning well with improved cannulation.  --PAD : monitor perfusion ( Post Left Ileofemoral Endarterectomy)  --Left Groin wound : post Muscle Flap : continue abtx and wound care.    4/29 MK   - ESRD: tolerting hd, AVF cannultion well  - inc sputum production with possible endobronchial mass: dr bush input noted, may need bronch  - AMS with more confusion and jerking motion: pain meds have been limited, will get ammonia and abg value, has not been using bipap during hospitalization  - [place]: fu h/h   - left groin wound s/p muscle flap coverage with s/p left ileofemoral endarterectomy      4/30 SY  --ESRD : Continue TIW HD  --Pulm : Hemoptysis resolved.  New RML PNA and FERNANDO nodule   improved resp status . Now PNA with mucus plugging and new findings of lesion.  For outpt work up once clinically improved.  --AMS ;resolved and at baseline.  Attempt to minimize pain meds.  --PAD : Monitor Left groin and LE wound.  --ID : continue Cefepime and Daptomycin with po vanco for C diff prophylaxis.    5/1 SY  --ESRD :HD order and tx reviewed.    --Pulm : New RML PNA and FERNANDO nodule.  Pulmonary follow up appreciated.  Continue ABTX.  --AMS : resolved and stable.  --Increasing Leukocytosis : continue abtx.  D/w Dr Flanagan.  --PAD : LLE pain improved. (post Left Ileofemoral endarterectomy last admission.

## 2019-05-01 NOTE — PROGRESS NOTE ADULT - SUBJECTIVE AND OBJECTIVE BOX
Patient is a 84y old  Male who presents with a chief complaint of L groin DC (28 Apr 2019 17:14)      HPI:  85 y/o male with ESRD on HD (MWF), HTN, RA on MTX and steroids, R leg amputation, severe CDI and megacolon, Severe PAD s/p LLE endarterectomy 2 weeks ago with dr dugan presents with T 100 and DC from L groin.  Pt also c/o R arm pain/swelling where pt had fistulogram of the AC fistula on last admission.  RUL dopplers negative for DVT.  L groin sono--negative for pseudo aneurysm and + hematoma.  Pt given 2.1 L IVF, IV vanco/aztreonam in ED  On my exam in HD suite, awake, chronically ill appearing, NAD, daughter at bedside. Pt seen by Dr dugan in ED.  CXR--clear    TTE (10/18)--mild-mod MR/EF 60%    Pt d/w daughter regarding advance directives--Pt is FULL RESUSCITATION (19 Apr 2019 16:12)  events noted while admitted muscle flap and plastic surgery care developed increased cough with possible bloody sttreaked sputuim / none present now and RN at bedside, both noticed thhick yellow sputum production    4/30  data discussed with RN at bedside  no cp  improved breathing    5/1  feels the same  decreased cough  DTR is contacted and data discussed regarding abnormal ct scan findings  PAST MEDICAL & SURGICAL HISTORY:  Above knee amputation of right lower extremity  Recurrent Clostridium difficile diarrhea  Diastolic CHF  Peripheral vascular disease  Afib  Anemia  CKD (chronic kidney disease)  COPD (chronic obstructive pulmonary disease)  SHAYY (obstructive sleep apnea)  Sepsis, due to unspecified organism: 2/2 poorly healing wounds b/l  Dyspepsia: On moderate exertion.  Sleep apnea, obstructive: Requires home 02 therapy, and treatment with BIPAP  Atelectasis  Pleural effusion, bilateral  Respiratory failure  Peripheral edema  CRI (chronic renal insufficiency)  Gout  Benign prostatic hypertrophy  Spinal stenosis  Hypercholesterolemia  GERD (gastroesophageal reflux disease)  CAD (coronary artery disease)  Hypertension  S/P angioplasty with stent  Cataract of left eye  Prostate: Surgery green light procedure.  S/P rotator cuff surgery: Right  S/P angioplasty      PREVIOUS DIAGNOSTIC TESTING:      MEDICATIONS  (STANDING):  allopurinol 100 milliGRAM(s) Oral <User Schedule>  aspirin  chewable 81 milliGRAM(s) Oral daily  atorvastatin 20 milliGRAM(s) Oral at bedtime  cefepime  Injectable. 1000 milliGRAM(s) IV Push daily  cholestyramine Powder (Sugar-Free) 4 Gram(s) Oral daily  DAPTOmycin IVPB      DAPTOmycin IVPB 290 milliGRAM(s) IV Intermittent every 48 hours  diltiazem    Tablet 60 milliGRAM(s) Oral four times a day  doxercalciferol Injectable 1 MICROGram(s) IV Push <User Schedule>  epoetin nelly Injectable 66552 Unit(s) IV Push <User Schedule>  finasteride 5 milliGRAM(s) Oral daily  lidocaine/prilocaine Cream 1 Application(s) Topical daily  pantoprazole    Tablet 40 milliGRAM(s) Oral before breakfast  predniSONE   Tablet 2.5 milliGRAM(s) Oral every other day  predniSONE   Tablet 5 milliGRAM(s) Oral every other day  sevelamer hydrochloride Oral Tab/Cap - Peds 800 milliGRAM(s) Oral three times a day with meals  silver sulfADIAZINE 1% Cream 1 Application(s) Topical daily  sodium chloride 0.9% lock flush 3 milliLiter(s) IV Push every 8 hours  vancomycin    Solution 125 milliGRAM(s) Oral every 6 hours    MEDICATIONS  (PRN):  acetaminophen   Tablet .. 650 milliGRAM(s) Oral every 6 hours PRN Temp greater or equal to 38.5C (101.3F)  acetaminophen   Tablet .. 650 milliGRAM(s) Oral every 6 hours PRN Mild Pain (1 - 3)  HYDROmorphone  Injectable 0.5 milliGRAM(s) IV Push every 4 hours PRN breakthrough pain  oxyCODONE    5 mG/acetaminophen 325 mG 2 Tablet(s) Oral every 6 hours PRN Moderate Pain (4 - 6)      FAMILY HISTORY:  Family history of colorectal cancer (Father)  Family history of diabetes mellitus (Mother, Sibling)  Family history of hypertension (Mother)      SOCIAL HISTORY:  ***    REVIEW OF SYSTEM:  Pertinent items are noted in HPI.  Constitutional negative for chills, fevers, sweats and weight loss  throat, and face:  negative for epistaxis, nasal congestion, sore throat and   tinnitus  Respiratory: negative for cough, dyspnea on exertion, pleuritic chest pain  and wheezing  Cardiovascular:  negative for chest pain, dyspnea and palpitations  Gastrointestinal: negative for abdominal pain, diarrhea, nausea and vomiting  Genitourinary: negative for dysuria, frequency and urinary incontinence  Skin:  negative for redness, rash, pruritus, swelling, dryness and   fissures  Hematologic/lymphatic: negative for bleeding and easy bruising  Musculoskeletal: negative for arthralgias, back pain and muscle weakness  Neurological: negative for dizziness, headaches, seizures and tremors  Behavioral/Psych:  negative for mood change, depression, suicidal attempts    Allergic/Immunologic: negative for anaphylaxis, angioedema and urticaria    Vital Signs Last 24 Hrs  T(C): 36.5 (30 Apr 2019 05:47), Max: 37.6 (29 Apr 2019 15:09)  T(F): 97.7 (30 Apr 2019 05:47), Max: 99.7 (29 Apr 2019 16:30)  HR: 74 (30 Apr 2019 07:00) (74 - 124)  BP: 125/29 (30 Apr 2019 07:00) (99/74 - 139/114)  BP(mean): 49 (30 Apr 2019 07:00) (44 - 120)  RR: 22 (30 Apr 2019 07:00) (17 - 26)  SpO2: 100% (30 Apr 2019 07:00) (93% - 100%)    I&O's Summary    PHYSICAL EXAM  General Appearance: cooperative, no acute distress,   HEENT: PERRL, conjunctiva clear, EOM's intact, non injected pharynx, no exudate, TM   normal  Neck: Supple, , no adenopathy, thyroid: not enlarged, no carotid bruit or JVD  Back: Symmetric, no  tenderness,no soft tissue tenderness  Lungs: Clear to auscultation bilateral,no adventitious breath sounds, normal   expiratory phase  Heart: Regular rate and rhythm, S1, S2 normal, no murmur, rub or gallop  Abdomen: Soft, non-tender, bowel sounds active , no hepatosplenomegaly  Extremities: no cyanosis or edema, no joint swelling, hx AKA right side  Skin: Skin color, texture normal, no rashes   Neurologic: Alert and oriented X3 , cranial nerves intact, sensory and motor normal,    ECG:    LABS:                          9.8    16.81 )-----------( 443      ( 01 May 2019 06:35 )             32.1                           9.9    13.16 )-----------( 358      ( 30 Apr 2019 06:08 )             32.1                           10.1   7.96  )-----------( 334      ( 29 Apr 2019 07:08 )             33.2     04-29    04-30    142  |  111<H>  |  19  ----------------------------<  87  3.8   |  24  |  2.70<H>    Ca    8.3<L>      30 Apr 2019 06:08  Phos  3.4     04-29    TPro  x   /  Alb  1.8<L>  /  TBili  x   /  DBili  x   /  AST  x   /  ALT  x   /  AlkPhos  x   04-29  138  |  108  |  35<H>  ----------------------------<  95  4.4   |  22  |  4.61<H>    Ca    8.0<L>      29 Apr 2019 07:08              PT/INR - ( 29 Apr 2019 07:08 )   PT: 39.9 sec;   INR: 3.46 ratio                   RADIOLOGY & ADDITIONAL STUDIES:  < from: CT Chest No Cont (04.28.19 @ 12:17) >  PROCEDURE:   CT of the Chest was performed without intravenous contrast.  Sagittal and coronal reformats were performed.    FINDINGS:    LUNGS AND LARGE AIRWAYS: Emphysema. Continued increase size of tubular   oblong opacity in the right middle lobe, possibly endobronchial mass with   impacted airways. Possible associated broncholiths. Irregular opacity in   the posterior left upper lobe (series 3, image 60), measuring 1.4 cm.  PLEURA: Small left pleural effusion. Trace right pleural effusion.  VESSELS: Atherosclerotic calcifications.   HEART: Heart size is normal. No pericardial effusion.  MEDIASTINUM AND [place]: No lymphadenopathy.  CHEST WALL AND LOWER NECK: Left thyroid nodule, measuring 2.9 cm.   Extension of the thyroid gland into the superior mediastinum.   VISUALIZED UPPER ABDOMEN: Cholelithiasis. Unchanged probable cyst in   hepatic segment 8.  BONES: Degenerative changes of the spine.    IMPRESSION:   Continued increase size of tubular oblong opacity in the right middle   lobe, possibly endobronchial mass with impacted airways. Irregular   opacity in the posterior left upper lobe, measuring 1.4 cm. PET/CT is   recommended for further evaluation.    Left thyroid nodule, measuring 2.9 cm. Further evaluation with thyroid   ultrasound is recommended.    < end of copied text >

## 2019-05-01 NOTE — PROGRESS NOTE ADULT - PROBLEM SELECTOR PLAN 3
Pt w/ acute altered mental status that hs resolved. Likely 2/2 dilaudid   -ammonia levels wnl.   -no hypercapnia on ABGs  -Pt afebrile. Incision site looks c/d/i w/o drainage or pus making infectious less likely. Pt on appropriate abxs Pt w/ metabolic encephalopathy that has resolved. Likely 2/2 dilaudid   -ammonia levels wnl.   -no hypercapnia on ABGs  -Pt afebrile. Incision site looks c/d/i w/o drainage or pus making infectious less likely. Pt on appropriate abxs

## 2019-05-01 NOTE — PROGRESS NOTE ADULT - SUBJECTIVE AND OBJECTIVE BOX
CHIEF COMPLAINT:    SUBJECTIVE:   85 y/o male with ESRD on HD (MWF), HTN, RA on MTX and steroids, R leg amputation, severe CDI and megacolon, Severe PAD s/p LLE endarterectomy (4/1/19)with dr Clement presents with T 100.5 and DC from L groin.  Pt also c/o R arm pain/swelling where pt had fistulogram of the AC fistula on last admission.    5/1 Pt seen and examined at bedside. Pt complaining of leg pain and needing to be turned onto his side. Decrease cough. No other complaints at this time. The daughter states that Dr. Maxwell says the masses are not malignant and similar to the prior film. As per the note there is a increase chance of malignancy and needs a bronchoscopy for diagnosis after the antibiotic course. Will clarify.    REVIEW OF SYSTEMS:  CONSTITUTIONAL: No weakness, fevers or chills  EYES/ENT: No visual changes;  No vertigo or throat pain   NECK: No pain or stiffness  RESPIRATORY: + cough decreased, no wheezing,no hemoptysis today; No shortness of breath  CARDIOVASCULAR: No chest pain or palpitations  GASTROINTESTINAL: No abdominal or epigastric pain. No nausea, vomiting, or hematemesis; No diarrhea or constipation. No melena or hematochezia.  GENITOURINARY: No dysuria, frequency or hematuria  NEUROLOGICAL: No numbness or weakness  SKIN: No itching, burning, rashes, or lesions   All other review of systems is negative unless indicated above    Vital Signs Last 24 Hrs  T(C): 37.1 (01 May 2019 05:00), Max: 37.1 (01 May 2019 05:00)  T(F): 98.7 (01 May 2019 05:00), Max: 98.7 (01 May 2019 05:00)  HR: 78 (01 May 2019 06:00) (69 - 84)  BP: 129/23 (01 May 2019 06:00) (100/71 - 135/42)  BP(mean): 45 (01 May 2019 06:00) (43 - 77)  RR: 19 (01 May 2019 06:00) (16 - 23)  SpO2: 97% (01 May 2019 06:00) (96% - 99%)    I&O's Summary    30 Apr 2019 07:01  -  01 May 2019 07:00  --------------------------------------------------------  IN: 480 mL / OUT: 0 mL / NET: 480 mL        CAPILLARY BLOOD GLUCOSE          PHYSICAL EXAM:  Constitutional: NAD, awake and alert, well-developed  HEENT: PERR, EOMI, Normal Hearing, MMM  Neck: Soft and supple, No LAD, No JVD  Respiratory: good air movement, R basilar crackles, No wheezing, rales or rhonchi  Cardiovascular: S1 and S2, regular rate and rhythm, no Murmurs, gallops or rubs  Gastrointestinal: Bowel Sounds present, soft, nontender, nondistended, no guarding, no rebound  Extremities: No peripheral edema. R BKA, Left surgical site has staples and is clean dry and intact. No drainage or erythema  Vascular: 2+ peripheral pulses  Neurological: A/O x 3, no focal deficits  Skin:  LLE incision site clean and dry, no erythema, no drainage     MEDICATIONS:  MEDICATIONS  (STANDING):  allopurinol 100 milliGRAM(s) Oral <User Schedule>  aspirin  chewable 81 milliGRAM(s) Oral daily  atorvastatin 20 milliGRAM(s) Oral at bedtime  cefepime  Injectable. 1000 milliGRAM(s) IV Push daily  cholestyramine Powder (Sugar-Free) 4 Gram(s) Oral daily  DAPTOmycin IVPB      DAPTOmycin IVPB 290 milliGRAM(s) IV Intermittent every 48 hours  diltiazem    Tablet 60 milliGRAM(s) Oral four times a day  doxercalciferol Injectable 1 MICROGram(s) IV Push <User Schedule>  epoetin nelly Injectable 95636 Unit(s) IV Push <User Schedule>  finasteride 5 milliGRAM(s) Oral daily  lidocaine/prilocaine Cream 1 Application(s) Topical daily  pantoprazole    Tablet 40 milliGRAM(s) Oral before breakfast  predniSONE   Tablet 2.5 milliGRAM(s) Oral every other day  predniSONE   Tablet 5 milliGRAM(s) Oral every other day  sevelamer hydrochloride Oral Tab/Cap - Peds 800 milliGRAM(s) Oral three times a day with meals  silver sulfADIAZINE 1% Cream 1 Application(s) Topical daily  sodium chloride 0.9% lock flush 3 milliLiter(s) IV Push every 8 hours  vancomycin    Solution 125 milliGRAM(s) Oral every 6 hours      LABS: All Labs Reviewed:                        9.8    16.81 )-----------( 443      ( 01 May 2019 06:35 )             32.1     05-01    140  |  108  |  28<H>  ----------------------------<  87  4.2   |  23  |  3.72<H>    Ca    8.2<L>      01 May 2019 06:35      PT/INR - ( 01 May 2019 06:35 )   PT: 37.1 sec;   INR: 3.22 ratio               Blood Culture:     RADIOLOGY/EKG:  < from: US Thyroid + Parathyroid (04.29.19 @ 17:06) >    IMPRESSION:     Dominant left thyroid nodule for which fine-needle aspiration could be   considered. Alternatively 6 month follow-up can be obtained.    < end of copied text >      < from: CT Chest No Cont (04.28.19 @ 12:17) >  IMPRESSION:   Continued increase size of tubular oblong opacity in the right middle   lobe, possibly endobronchial mass with impacted airways. Irregular   opacity in the posterior left upper lobe, measuring 1.4 cm. PET/CT is   recommended for further evaluation.    Left thyroid nodule, measuring 2.9 cm. Further evaluation with thyroid   ultrasound is recommended.    < end of copied text >      < from: Xray Chest 1 View- PORTABLE-Urgent (04.27.19 @ 15:51) >  Impression:    Right lower lobe opacity may represent pneumonia versus neoplasm versus   vascular artifact, recommend correlation with clinical symptoms and   short-term follow-up to resolution    < end of copied text >      < from: US Duplex Arterial Lower Ext Ltd, Left (04.19.19 @ 12:13) >  Impression:    Focal ultrasound of the left groin demonstrates no evidence of   pseudoaneurysm.    Hematoma anterior to the left common femoral artery measuring   approximately 3.2 x 0.9 x 5.0 cm.    There is plaque within the proximal left femoral artery, with an elevated   velocity of 110 cm/s.        < from: US Duplex Venous Upper Ext Ltd, Right (04.19.19 @ 12:16) >  IMPRESSION:     No evidence of right upper extremity deep venous thrombosis.  Markedly increased velocities within the patient's arteriovenous fistula   raising suspicious for hemodynamically significant stenosis.  DVT PPX:  on coumadin. Held because supratherapeutic  ADVANCED DIRECTIVE:    DISPOSITION:

## 2019-05-02 DIAGNOSIS — R91.8 OTHER NONSPECIFIC ABNORMAL FINDING OF LUNG FIELD: ICD-10-CM

## 2019-05-02 LAB
INR BLD: 2.76 RATIO — HIGH (ref 0.88–1.16)
PROTHROM AB SERPL-ACNC: 31.6 SEC — HIGH (ref 10–12.9)

## 2019-05-02 PROCEDURE — 99221 1ST HOSP IP/OBS SF/LOW 40: CPT

## 2019-05-02 RX ORDER — WARFARIN SODIUM 2.5 MG/1
2 TABLET ORAL ONCE
Qty: 0 | Refills: 0 | Status: COMPLETED | OUTPATIENT
Start: 2019-05-02 | End: 2019-05-02

## 2019-05-02 RX ORDER — OXYCODONE AND ACETAMINOPHEN 5; 325 MG/1; MG/1
1 TABLET ORAL EVERY 4 HOURS
Qty: 0 | Refills: 0 | Status: DISCONTINUED | OUTPATIENT
Start: 2019-05-02 | End: 2019-05-06

## 2019-05-02 RX ADMIN — SODIUM CHLORIDE 3 MILLILITER(S): 9 INJECTION INTRAMUSCULAR; INTRAVENOUS; SUBCUTANEOUS at 06:12

## 2019-05-02 RX ADMIN — OXYCODONE AND ACETAMINOPHEN 1 TABLET(S): 5; 325 TABLET ORAL at 11:45

## 2019-05-02 RX ADMIN — SEVELAMER CARBONATE 800 MILLIGRAM(S): 2400 POWDER, FOR SUSPENSION ORAL at 17:34

## 2019-05-02 RX ADMIN — OXYCODONE AND ACETAMINOPHEN 1 TABLET(S): 5; 325 TABLET ORAL at 22:39

## 2019-05-02 RX ADMIN — Medication 650 MILLIGRAM(S): at 08:23

## 2019-05-02 RX ADMIN — Medication 125 MILLIGRAM(S): at 06:13

## 2019-05-02 RX ADMIN — FINASTERIDE 5 MILLIGRAM(S): 5 TABLET, FILM COATED ORAL at 11:58

## 2019-05-02 RX ADMIN — Medication 60 MILLIGRAM(S): at 23:13

## 2019-05-02 RX ADMIN — PANTOPRAZOLE SODIUM 40 MILLIGRAM(S): 20 TABLET, DELAYED RELEASE ORAL at 06:13

## 2019-05-02 RX ADMIN — ATORVASTATIN CALCIUM 20 MILLIGRAM(S): 80 TABLET, FILM COATED ORAL at 21:51

## 2019-05-02 RX ADMIN — Medication 60 MILLIGRAM(S): at 11:58

## 2019-05-02 RX ADMIN — Medication 5 MILLIGRAM(S): at 11:58

## 2019-05-02 RX ADMIN — OXYCODONE AND ACETAMINOPHEN 1 TABLET(S): 5; 325 TABLET ORAL at 21:51

## 2019-05-02 RX ADMIN — Medication 81 MILLIGRAM(S): at 11:46

## 2019-05-02 RX ADMIN — SEVELAMER CARBONATE 800 MILLIGRAM(S): 2400 POWDER, FOR SUSPENSION ORAL at 08:26

## 2019-05-02 RX ADMIN — CEFEPIME 1000 MILLIGRAM(S): 1 INJECTION, POWDER, FOR SOLUTION INTRAMUSCULAR; INTRAVENOUS at 11:58

## 2019-05-02 RX ADMIN — Medication 100 MILLIGRAM(S): at 08:25

## 2019-05-02 RX ADMIN — SODIUM CHLORIDE 3 MILLILITER(S): 9 INJECTION INTRAMUSCULAR; INTRAVENOUS; SUBCUTANEOUS at 13:57

## 2019-05-02 RX ADMIN — WARFARIN SODIUM 2 MILLIGRAM(S): 2.5 TABLET ORAL at 21:51

## 2019-05-02 RX ADMIN — Medication 650 MILLIGRAM(S): at 23:31

## 2019-05-02 RX ADMIN — CHOLESTYRAMINE 4 GRAM(S): 4 POWDER, FOR SUSPENSION ORAL at 08:25

## 2019-05-02 RX ADMIN — Medication 125 MILLIGRAM(S): at 11:46

## 2019-05-02 RX ADMIN — SEVELAMER CARBONATE 800 MILLIGRAM(S): 2400 POWDER, FOR SUSPENSION ORAL at 11:59

## 2019-05-02 RX ADMIN — SODIUM CHLORIDE 3 MILLILITER(S): 9 INJECTION INTRAMUSCULAR; INTRAVENOUS; SUBCUTANEOUS at 21:50

## 2019-05-02 RX ADMIN — Medication 60 MILLIGRAM(S): at 06:13

## 2019-05-02 NOTE — PROGRESS NOTE ADULT - PROBLEM SELECTOR PLAN 6
Pt w/ hx of recurrent C diff w/ megacolon  -ID recs appreciated  -continue Vancomycin PO( day #12) Pt w/ hx of recurrent C diff w/ megacolon  -ID recs appreciated  -D/C abxs and monitor

## 2019-05-02 NOTE — CHART NOTE - NSCHARTNOTEFT_GEN_A_CORE
Pt seen and examined with house staff.  Plan formulated and reviewed on rounds     Briefly, 85 y/o male with AFib on coumadin, ESRD on HD (MWF), HTN, RA on MTX and steroids, R leg amputation, severe CDI and megacolon, Severe PAD s/p LLE endarterectomy 2 weeks PTA with dr dugan admitted with post op infection/sepsis at surgical site--s/p flap on 4/23.  Pt noted to have possible RML elongated endobronchial lesion and FERNANDO lesion on CT ordered for cough and bloody sputum.    ROS o/w negative     Stable vitals No fevers  Awake and alert  Daughter at bedside  L ankle ulcer wo discharge    For possible FOB and Bx next week--once date is set, will need to hold coumadin 3 days prior and start UFH gtt for AFib  Will need stress dose steroids on day of surgery--HC 100mg IV X 3 doses  Local wound care    Above d/w daughter at bedside

## 2019-05-02 NOTE — PROGRESS NOTE ADULT - PROBLEM SELECTOR PLAN 2
Pt w/ hemoptysis likely 2/2 from lung mass  -chest xray as above: PNA vs neoplasm vs artifact  -CT scan of chest as above  -Thyroid US results as above. Needs FNA. Can be outpatient  -pulmonary consult appreciated PET scan outpatient and bronch after antibiotics. awaiting  thoracic surgery evaluation for possible biopsy and bronchoscopy Pt w/ R and L lung mass seen on CT. R endobronchial mass enlarging from previous CT and the L lung mass is knew. Likely cause of hemoptysis. Pt also with Thyroid mass increase suspicion for malignancy  -chest xray as above: PNA vs neoplasm vs artifact  -CT scan of chest as above  -Thyroid US results as above. Needs FNA. Can be outpatient  -pulmonary consult appreciated PET scan outpatient   -Thoracic surgery will do flexible bronchoscopy next week

## 2019-05-02 NOTE — PROGRESS NOTE ADULT - ASSESSMENT
83 y/o male with ESRD on HD (MWF), HTN, RA on MTX and steroids, R leg amputation, severe CDI and megacolon, Severe PAD s/p LLE endarterectomy 2 weeks ago with dr dugan presents with T 100 and DC from L groin.  Pt also c/o R arm pain/swelling where pt had fistulogram of the AC fistula on last admission.  RUL dopplers negative for DVT.  L groin sono--negative for pseudo aneurysm and + hematoma, eval [facility] surgery noted to have seropurulent drainage from L groin along medial flap and erythema along wound edges concerning for superimposed infection.  Pt given 2.1 L IVF, IV vanco/aztreonam in ED.     1. RML opacity ?pna ? endobronchial lesion. FERNANDO pulmonary nodule. L groin wound infection/hematoma. s/p LLE endarterectomy. ESRD. PCN allergy  - pulm eval appreciated, CT chest reviewed, L groin wound improved  - s/p debridement/muscle flap closure 4/23 deep wound cx w VRE/CONS  - completed 7 days of daptomycin for VRE/CONS coverage, s/p vancomycin #6  - s/p cefepime 1gm daily #10 for gnr resistant coverage for probable pna, s/p aztreonam #4  - will dc further abx   - hx of cdad stop further prophylactic oral vancomycin  - plan for bronchoscopy per CTS to further eval RML/FERNANDO findings r/o malignancy  - blood cx no growth  - monitor temps  - tolerating abx well so far; no side effects noted  - reason for abx use and side effects reviewed with patient  - supportive care  - f/u cbc    2. other issues - care per medicine 85 y/o male with ESRD on HD (MWF), HTN, RA on MTX and steroids, R leg amputation, severe CDI and megacolon, Severe PAD s/p LLE endarterectomy 2 weeks ago with dr dugan presents with T 100 and DC from L groin.  Pt also c/o R arm pain/swelling where pt had fistulogram of the AC fistula on last admission.  RUL dopplers negative for DVT.  L groin sono--negative for pseudo aneurysm and + hematoma, eval [facility] surgery noted to have seropurulent drainage from L groin along medial flap and erythema along wound edges concerning for superimposed infection.  Pt given 2.1 L IVF, IV vanco/aztreonam in ED.     1. RML opacity ?pna ? endobronchial lesion. FERNANDO pulmonary nodule. L groin wound infection/hematoma. s/p LLE endarterectomy. ESRD. PCN allergy  - pulm eval appreciated, CT chest reviewed, L groin wound improved  - s/p debridement/muscle flap closure 4/23 deep wound cx w VRE/CONS  - completed 8 days of daptomycin for VRE/CONS coverage, s/p vancomycin #6  - s/p cefepime 1gm daily #10 for gnr resistant coverage for probable pna, s/p aztreonam #4  - will dc further abx   - hx of cdad stop further prophylactic oral vancomycin  - plan for bronchoscopy per CTS to further eval RML/FERNANDO findings r/o malignancy  - blood cx no growth  - monitor temps  - tolerating abx well so far; no side effects noted  - reason for abx use and side effects reviewed with patient  - supportive care  - f/u cbc    2. other issues - care per medicine

## 2019-05-02 NOTE — CONSULT NOTE ADULT - SUBJECTIVE AND OBJECTIVE BOX
HPI: 85 y/o male with ESRD on HD (MWF), CHF, A fib, CAD (stent), COPD (home O2), SHAYY (BIPAP), HTN, HLD, RA (on MTX and steroids), gout, GERD, C diff, megacolon,  s/p R AKA, severe PAD s/p LLE endarterectomy.   CT chest revealed RML opacity likely representing an endobronchial mass and a 1.4 cm FERNANDO opacity. Patient admits to coughing for a few months with intermittent bloody sputum production. Previous smoker since 10yo, but quit 25 years ago. PMH of emphysema with home O2 requirement at assisted living facility. Admits to fever and chills at home prior to admission and now on IV ABX for L groin wound infection. Family bedside.       PAST MEDICAL & SURGICAL HISTORY:  Above knee amputation of right lower extremity  Recurrent Clostridium difficile diarrhea  Diastolic CHF  Peripheral vascular disease  Afib  Anemia  CKD (chronic kidney disease)  COPD (chronic obstructive pulmonary disease)  SHAYY (obstructive sleep apnea)  Sepsis, due to unspecified organism: 2/2 poorly healing wounds b/l  Dyspepsia: On moderate exertion.  Sleep apnea, obstructive: Requires home 02 therapy, and treatment with BIPAP  Atelectasis  Pleural effusion, bilateral  Respiratory failure  Peripheral edema  CRI (chronic renal insufficiency)  Gout  Benign prostatic hypertrophy  Spinal stenosis  Hypercholesterolemia  GERD (gastroesophageal reflux disease)  CAD (coronary artery disease)  Hypertension  S/P angioplasty with stent  Cataract of left eye  Prostate: Surgery green light procedure.  S/P rotator cuff surgery: Right  S/P angioplasty      REVIEW OF SYSTEMS  General: No Weight change/ Fatigue/ HA/Dizzy	  Skin/Breast: No Rashes/ Lesions/ Masses  Ophthalmologic: No Blurry vision/ Glaucoma/ Blindness  ENMT: No Hearing loss/ Drainage/ Lesions	  Respiratory and Thorax: See HPI  Cardiovascular: No Chest pain/ Palpitations/ Diaphoresis	  Gastrointestinal: No Nausea/ Vomiting/ Constipation/ Appetite Change	  Genitourinary: ESRD on HD  Musculoskeletal: L foot pain  Neurological: No Seizures/ TIA/CVA/ Paresthesias  Psychiatric: No Dementia/ Depression	  Hematology/Lymphatics: No hx of bleeding/ Edema	  Endocrine: No Hyperglycemia/ Hypoglycemia  Allergic/Immunologic:	Allergic to Zosyn    MEDICATIONS  (STANDING):  allopurinol 100 milliGRAM(s) Oral <User Schedule>  aspirin  chewable 81 milliGRAM(s) Oral daily  atorvastatin 20 milliGRAM(s) Oral at bedtime  cefepime  Injectable. 1000 milliGRAM(s) IV Push daily  cholestyramine Powder (Sugar-Free) 4 Gram(s) Oral daily  DAPTOmycin IVPB      DAPTOmycin IVPB 290 milliGRAM(s) IV Intermittent every 48 hours  diltiazem    Tablet 60 milliGRAM(s) Oral four times a day  doxercalciferol Injectable 1 MICROGram(s) IV Push <User Schedule>  epoetin nelly Injectable 98509 Unit(s) IV Push <User Schedule>  finasteride 5 milliGRAM(s) Oral daily  lidocaine/prilocaine Cream 1 Application(s) Topical daily  pantoprazole    Tablet 40 milliGRAM(s) Oral before breakfast  predniSONE   Tablet 2.5 milliGRAM(s) Oral every other day  predniSONE   Tablet 5 milliGRAM(s) Oral every other day  sevelamer hydrochloride Oral Tab/Cap - Peds 800 milliGRAM(s) Oral three times a day with meals  silver sulfADIAZINE 1% Cream 1 Application(s) Topical daily  sodium chloride 0.9% lock flush 3 milliLiter(s) IV Push every 8 hours  vancomycin    Solution 125 milliGRAM(s) Oral every 6 hours    MEDICATIONS  (PRN):  acetaminophen   Tablet .. 650 milliGRAM(s) Oral every 6 hours PRN Temp greater or equal to 38.5C (101.3F)  acetaminophen   Tablet .. 650 milliGRAM(s) Oral every 6 hours PRN Mild Pain (1 - 3)  ALBUTerol    90 MICROgram(s) HFA Inhaler 2 Puff(s) Inhalation every 6 hours PRN Shortness of Breath and/or Wheezing  guaiFENesin   Syrup  (Sugar-Free) 100 milliGRAM(s) Oral every 6 hours PRN Cough      Allergies: Zosyn (Rash)    SOCIAL HISTORY:  Occupation: Previous [employer]  Smoking Hx: Yes, from ages [age]  Etoh Hx: No  IVDA Hx: No    FAMILY HISTORY:  Family history of colorectal cancer (Father)  Family history of diabetes mellitus (Mother, Sibling)  Family history of hypertension (Mother)      Vital Signs Last 24 Hrs  T(C): 36.9 (02 May 2019 05:24), Max: 37.3 (01 May 2019 14:34)  T(F): 98.4 (02 May 2019 05:24), Max: 99.2 (01 May 2019 14:34)  HR: 77 (02 May 2019 05:24) (63 - 90)  BP: 129/30 (02 May 2019 05:24) (104/32 - 168/36)  BP(mean): 60 (01 May 2019 16:00) (60 - 60)  RR: 18 (02 May 2019 05:24) (15 - 21)  SpO2: 97% (02 May 2019 05:24) (92% - 98%)    I&O's Detail    01 May 2019 07:01  -  02 May 2019 07:00  --------------------------------------------------------  IN:    Oral Fluid: 100 mL  Total IN: 100 mL    OUT:  Total OUT: 0 mL    Total NET: 100 mL      Exam:   General: NAD  Neurology: Awake, alert, nonfocal, MURPHY x 3  Eyes: Scleras clear, PERRLA/ EOMI, Gross vision intact  ENT: Gross hearing intact but [place], grossly patent pharynx, no stridor  Neck: Neck supple, trachea midline, No JVD,   Respiratory: RUL rhonchi otherwise CTA in all other fields  CV: RRR, S1S2, no murmurs, rubs or gallops  Abdominal: Soft, NT, ND   Extremities: No edema, staples intact on Left thigh, RUE fistula  Skin: multiple bruises, L groin wound erythematous and draining, staples intact  Lymphatic: No Neck, axilla, LAD  Psych: Oriented x 3, normal affect      LABS:                        9.8    16.81 )-----------( 443      ( 01 May 2019 06:35 )             32.1     05-01    140  |  108  |  28<H>  ----------------------------<  87  4.2   |  23  |  3.72<H>    Ca    8.2<L>      01 May 2019 06:35      PT/INR - ( 02 May 2019 05:56 )   PT: 31.6 sec;   INR: 2.76 ratio               RADIOLOGY & ADDITIONAL STUDIES:    < from: CT Chest No Cont (04.28.19 @ 12:17) >  Continued increase size of tubular oblong opacity in the right middle   lobe, possibly endobronchial mass with impacted airways. Irregular   opacity in the posterior left upper lobe, measuring 1.4 cm. PET/CT is   recommended for further evaluation.    < end of copied text >  < from: Xray Chest 1 View- PORTABLE-Routine (04.30.19 @ 07:34) >  Bilateral hazy opacity most consistent with a CHF pattern.  Retrocardiac opacity, likely atelectasis. Underlying pneumonia is   difficult to exclude.    < end of copied text >      ASSESSMENT:   84yMalePAST MEDICAL & SURGICAL HISTORY:  Above knee amputation of right lower extremity  Recurrent Clostridium difficile diarrhea  Diastolic CHF  Peripheral vascular disease  Afib  Anemia  CKD (chronic kidney disease)  COPD (chronic obstructive pulmonary disease)  SHAYY (obstructive sleep apnea)  Sepsis, due to unspecified organism: 2/2 poorly healing wounds b/l  Dyspepsia: On moderate exertion.  Sleep apnea, obstructive: Requires home 02 therapy, and treatment with BIPAP  Atelectasis  Pleural effusion, bilateral  Respiratory failure  Peripheral edema  CRI (chronic renal insufficiency)  Gout  Benign prostatic hypertrophy  Spinal stenosis  Hypercholesterolemia  GERD (gastroesophageal reflux disease)  CAD (coronary artery disease)  Hypertension  S/P angioplasty with stent  Cataract of left eye  Prostate: Surgery green light procedure.  S/P rotator cuff surgery: Right  S/P angioplasty  HEALTH ISSUES - PROBLEM Dx:  Confusion: Confusion  Hemoptysis: Hemoptysis  Supratherapeutic INR: Supratherapeutic INR  Peripheral vascular disease: Peripheral vascular disease  Rheumatoid arthritis: Rheumatoid arthritis  Chronic diastolic congestive heart failure: Chronic diastolic congestive heart failure  CKD (chronic kidney disease): CKD (chronic kidney disease)  Afib  Recurrent Clostridium difficile diarrhea  Pancytopenia: Pancytopenia  Pain of right upper extremity: Pain of right upper extremity  Surgical site infection: Surgical site infection

## 2019-05-02 NOTE — PROGRESS NOTE ADULT - SUBJECTIVE AND OBJECTIVE BOX
Date of service: 05-02-19 @ 11:58    pt seen and examined  s/p debridement/muscle flap closure of L groin wound 4/23  afebrile, reports cough  no o/n events    ROS: no fever or chills; denies dizziness, no HA, no abdominal pain, no diarrhea or constipation; no dysuria, no urinary frequency, no legs pain, no rashes      MEDICATIONS  (STANDING):  allopurinol 100 milliGRAM(s) Oral <User Schedule>  aspirin  chewable 81 milliGRAM(s) Oral daily  atorvastatin 20 milliGRAM(s) Oral at bedtime  cefepime  Injectable. 1000 milliGRAM(s) IV Push daily  cholestyramine Powder (Sugar-Free) 4 Gram(s) Oral daily  DAPTOmycin IVPB      DAPTOmycin IVPB 290 milliGRAM(s) IV Intermittent every 48 hours  diltiazem    Tablet 60 milliGRAM(s) Oral four times a day  doxercalciferol Injectable 1 MICROGram(s) IV Push <User Schedule>  epoetin nelly Injectable 50948 Unit(s) IV Push <User Schedule>  finasteride 5 milliGRAM(s) Oral daily  lidocaine/prilocaine Cream 1 Application(s) Topical daily  pantoprazole    Tablet 40 milliGRAM(s) Oral before breakfast  predniSONE   Tablet 2.5 milliGRAM(s) Oral every other day  predniSONE   Tablet 5 milliGRAM(s) Oral every other day  sevelamer hydrochloride Oral Tab/Cap - Peds 800 milliGRAM(s) Oral three times a day with meals  silver sulfADIAZINE 1% Cream 1 Application(s) Topical daily  sodium chloride 0.9% lock flush 3 milliLiter(s) IV Push every 8 hours  vancomycin    Solution 125 milliGRAM(s) Oral every 6 hours  warfarin 2 milliGRAM(s) Oral once      Vital Signs Last 24 Hrs  T(C): 37.2 (02 May 2019 10:49), Max: 37.3 (01 May 2019 14:34)  T(F): 99 (02 May 2019 10:49), Max: 99.2 (01 May 2019 14:34)  HR: 78 (02 May 2019 10:49) (67 - 90)  BP: 155/32 (02 May 2019 10:49) (106/32 - 168/36)  BP(mean): 60 (01 May 2019 16:00) (60 - 60)  RR: 18 (02 May 2019 10:49) (15 - 21)  SpO2: 98% (02 May 2019 10:49) (95% - 98%)      PE:  Constitutional: frail looking  HEENT: NC/AT, EOMI, PERRLA, conjunctivae clear; ears and nose atraumatic; pharynx benign  Neck: supple; thyroid not palpable  Back: no tenderness  Respiratory: decreased breath sounds  Cardiovascular: S1S2 regular, no murmurs  Abdomen: soft, not tender, not distended, positive BS; liver and spleen WNL  Genitourinary: no suprapubic tenderness  Lymphatic: no LN palpable  Musculoskeletal: no muscle tenderness, no joint swelling or tenderness  Extremities: R fistula   Neurological/ Psychiatric:  moving all extremities  Skin: L groin ecchymosis, staples intact, no drainage no palpable lesions, LLE wound    Labs: all available labs reviewed                                                  9.8    16.81 )-----------( 443      ( 01 May 2019 06:35 )             32.1     05-01    140  |  108  |  28<H>  ----------------------------<  87  4.2   |  23  |  3.72<H>    Ca    8.2<L>      01 May 2019 06:35             Culture - Surgical Swab (04.23.19 @ 15:08)    -  Cefazolin: R <=4    -  Trimethoprim/Sulfamethoxazole: R >2/38    -  Vancomycin: R >16    -  Vancomycin: S 4    -  RIF- Rifampin: S <=1 Should not be used as monotherapy    -  Tetra/Doxy: R >8    -  Tetra/Doxy: R >8    -  Oxacillin: R >2    -  Penicillin: R 8    -  Linezolid: S 2    -  Gentamicin: R >8 Should not be used as monotherapy    -  Levofloxacin: R >4    -  Erythromycin: R >4    -  Clindamycin: R >4    -  Daptomycin: S 4    -  Ampicillin: R >8 Predicts results to ampicillin/sulbactam, amoxacillin-clavulanate and  piperacillin-tazobactam.    -  Ampicillin/Sulbactam: R <=8/4    Specimen Source: .Surgical Swab left groin deep wound culture    Culture Results:   Rare Enterococcus faecium (vancomycin resistant)  Rare Coag Negative Staphylococcus    Organism Identification: Enterococcus faecium (vancomycin resistant)  Coag Negative Staphylococcus    Organism: Enterococcus faecium (vancomycin resistant)    Organism: Coag Negative Staphylococcus    Method Type: DAVIDSON    Method Type: DAVIDSON           Culture - Abscess with Gram Stain (04.21.19 @ 08:40)    -  Amikacin: S <=16    -  Levofloxacin: R >4    -  Levofloxacin: S <=2    -  Ciprofloxacin: S <=1    -  Daptomycin: S 2    -  Ceftriaxone: S <=1 Enterobacter, Citrobacter, and Serratia may develop resistance during prolonged therapy    -  Cefoxitin: S <=8    -  Cefepime: S <=4    -  Cefazolin: S <=8    -  Aztreonam: S <=4    -  Piperacillin/Tazobactam: S <=16    -  Tetra/Doxy: R >8    -  Linezolid: S 1    -  Ertapenem: S <=1    -  Gentamicin: S <=4    -  Tobramycin: S <=4    -  Meropenem: S <=1    -  Trimethoprim/Sulfamethoxazole: S <=2/38    -  Vancomycin: R >16    -  Ampicillin: R >8 Predicts results to ampicillin/sulbactam, amoxacillin-clavulanate and  piperacillin-tazobactam.    -  Ampicillin: S <=8 These ampicillin results predict results for amoxicillin    -  Ampicillin/Sulbactam: S <=8/4    Specimen Source: .Abscess Leg - Left    Culture Results:   Moderate Enterococcus faecium (vancomycin resistant)  Few Proteus mirabilis    Organism Identification: Enterococcus faecium (vancomycin resistant)  Proteus mirabilis    Organism: Enterococcus faecium (vancomycin resistant)    Organism: Proteus mirabilis    Method Type: DAVIDSON    Method Type: DAVIDSON      Culture - Abscess with Gram Stain (04.21.19 @ 08:40)    -  Trimethoprim/Sulfamethoxazole: S <=2/38    -  Amikacin: S <=16    -  Ampicillin: R >8 Predicts results to ampicillin/sulbactam, amoxacillin-clavulanate and  piperacillin-tazobactam.    -  Ampicillin: S <=8 These ampicillin results predict results for amoxicillin    -  Aztreonam: S <=4    -  Cefazolin: S <=8    -  Cefepime: S <=4    -  Cefoxitin: S <=8    -  Ciprofloxacin: S <=1    -  Ertapenem: S <=1    -  Daptomycin: S 2    -  Vancomycin: R >16    -  Ampicillin/Sulbactam: S <=8/4    -  Ceftriaxone: S <=1 Enterobacter, Citrobacter, and Serratia may develop resistance during prolonged therapy    -  Gentamicin: S <=4    -  Levofloxacin: R >4    -  Levofloxacin: S <=2    -  Linezolid: S 1    -  Piperacillin/Tazobactam: S <=16    -  Tetra/Doxy: R >8    -  Meropenem: S <=1    -  Tobramycin: S <=4    Specimen Source: .Abscess Leg - Left    Culture Results:   Moderate Enterococcus faecium (vancomycin resistant)  Few Proteus mirabilis    Organism Identification: Enterococcus faecium (vancomycin resistant)  Proteus mirabilis    Organism: Enterococcus faecium (vancomycin resistant)    Organism: Proteus mirabilis    Method Type: DAVIDSON    Method Type: DAVIDSON          Radiology: all available radiological tests reviewed  < from: CT Chest No Cont (04.28.19 @ 12:17) >  EXAM:  CT CHEST                            PROCEDURE DATE:  04/28/2019          INTERPRETATION:  CLINICAL INFORMATION: Hemoptysis, possible neoplasm.    COMPARISON: December 21, 2015. Correlation is made to CT abdomen and   pelvis from October 5, 2018.    PROCEDURE:   CT of the Chest was performed without intravenous contrast.  Sagittal and coronal reformats were performed.    FINDINGS:    LUNGS AND LARGE AIRWAYS: Emphysema. Continued increase size of tubular   oblong opacity in the right middle lobe, possibly endobronchial mass with   impacted airways. Possible associated broncholiths. Irregular opacity in   the posterior left upper lobe (series 3, image 60), measuring 1.4 cm.  PLEURA: Small left pleural effusion. Trace right pleural effusion.  VESSELS: Atherosclerotic calcifications.   HEART: Heart size is normal. No pericardial effusion.  MEDIASTINUM AND [place]: No lymphadenopathy.  CHEST WALL AND LOWER NECK: Left thyroid nodule, measuring 2.9 cm.   Extension of the thyroid gland into the superior mediastinum.   VISUALIZED UPPER ABDOMEN: Cholelithiasis. Unchanged probable cyst in   hepatic segment 8.  BONES: Degenerative changes of the spine.    IMPRESSION:   Continued increase size of tubular oblong opacity in the right middle   lobe, possibly endobronchial mass with impacted airways. Irregular   opacity in the posterior left upper lobe, measuring 1.4 cm. PET/CT is   recommended for further evaluation.    Left thyroid nodule, measuring 2.9 cm. Further evaluation with thyroid   ultrasound is recommended.      EXAM:  US DPLX UPR EXT VEINS LTD RT                              PROCEDURE DATE:  04/19/2019          INTERPRETATION:  CLINICAL INFORMATION: Right arm swelling    COMPARISON: None available.    TECHNIQUE: Duplex sonography of the RIGHT UPPER extremity with color and   spectral Doppler, with and without compression.      FINDINGS:    The right internal jugular, subclavian, axillary, brachial, basilic and   cephalic veins are patent and compressible where applicable.     Doppler examination shows normal spontaneous and phasic flow.    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  Markedly increased velocities within the patient's arteriovenous fistula   raising suspicious for hemodynamically significant stenosis.    < end of copied text >    Advanced directives addressed: full resuscitation

## 2019-05-02 NOTE — PROGRESS NOTE ADULT - SUBJECTIVE AND OBJECTIVE BOX
Patient is a 84y old  Male who presents with a chief complaint of L groin DC (28 Apr 2019 17:14)      HPI:  85 y/o male with ESRD on HD (MWF), HTN, RA on MTX and steroids, R leg amputation, severe CDI and megacolon, Severe PAD s/p LLE endarterectomy 2 weeks ago with dr dugan presents with T 100 and DC from L groin.  Pt also c/o R arm pain/swelling where pt had fistulogram of the AC fistula on last admission.  RUL dopplers negative for DVT.  L groin sono--negative for pseudo aneurysm and + hematoma.  Pt given 2.1 L IVF, IV vanco/aztreonam in ED  On my exam in HD suite, awake, chronically ill appearing, NAD, daughter at bedside. Pt seen by Dr dugan in ED.  CXR--clear    TTE (10/18)--mild-mod MR/EF 60%    Pt d/w daughter regarding advance directives--Pt is FULL RESUSCITATION (19 Apr 2019 16:12)  events noted while admitted muscle flap and plastic surgery care developed increased cough with possible bloody streaked sputuim / none present now and RN at bedside, both noticed thhick yellow sputum production    4/30  data discussed with RN at bedside  no cp  improved breathing    5/1  feels the same  decreased cough  DTR is contacted and data discussed regarding abnormal ct scan findings  5/2  data discussed with pt's DTR and medical team as well  although BRONCH is appropriate given ct scan findings, it can not be done yet till INR corrected specially in pt with ESRD.  DATA DISCUSSED WITH DR JORGE MASSEY AS WELL for thoracic surgery consult  PAST MEDICAL & SURGICAL HISTORY:  Above knee amputation of right lower extremity  Recurrent Clostridium difficile diarrhea  Diastolic CHF  Peripheral vascular disease  Afib  Anemia  CKD (chronic kidney disease)  COPD (chronic obstructive pulmonary disease)  SHAYY (obstructive sleep apnea)  Sepsis, due to unspecified organism: 2/2 poorly healing wounds b/l  Dyspepsia: On moderate exertion.  Sleep apnea, obstructive: Requires home 02 therapy, and treatment with BIPAP  Atelectasis  Pleural effusion, bilateral  Respiratory failure  Peripheral edema  CRI (chronic renal insufficiency)  Gout  Benign prostatic hypertrophy  Spinal stenosis  Hypercholesterolemia  GERD (gastroesophageal reflux disease)  CAD (coronary artery disease)  Hypertension  S/P angioplasty with stent  Cataract of left eye  Prostate: Surgery green light procedure.  S/P rotator cuff surgery: Right  S/P angioplasty      PREVIOUS DIAGNOSTIC TESTING:      MEDICATIONS  (STANDING):  allopurinol 100 milliGRAM(s) Oral <User Schedule>  aspirin  chewable 81 milliGRAM(s) Oral daily  atorvastatin 20 milliGRAM(s) Oral at bedtime  cefepime  Injectable. 1000 milliGRAM(s) IV Push daily  cholestyramine Powder (Sugar-Free) 4 Gram(s) Oral daily  DAPTOmycin IVPB      DAPTOmycin IVPB 290 milliGRAM(s) IV Intermittent every 48 hours  diltiazem    Tablet 60 milliGRAM(s) Oral four times a day  doxercalciferol Injectable 1 MICROGram(s) IV Push <User Schedule>  epoetin nelly Injectable 61765 Unit(s) IV Push <User Schedule>  finasteride 5 milliGRAM(s) Oral daily  lidocaine/prilocaine Cream 1 Application(s) Topical daily  pantoprazole    Tablet 40 milliGRAM(s) Oral before breakfast  predniSONE   Tablet 2.5 milliGRAM(s) Oral every other day  predniSONE   Tablet 5 milliGRAM(s) Oral every other day  sevelamer hydrochloride Oral Tab/Cap - Peds 800 milliGRAM(s) Oral three times a day with meals  silver sulfADIAZINE 1% Cream 1 Application(s) Topical daily  sodium chloride 0.9% lock flush 3 milliLiter(s) IV Push every 8 hours  vancomycin    Solution 125 milliGRAM(s) Oral every 6 hours      MEDICATIONS  (PRN):  acetaminophen   Tablet .. 650 milliGRAM(s) Oral every 6 hours PRN Temp greater or equal to 38.5C (101.3F)  acetaminophen   Tablet .. 650 milliGRAM(s) Oral every 6 hours PRN Mild Pain (1 - 3)  HYDROmorphone  Injectable 0.5 milliGRAM(s) IV Push every 4 hours PRN breakthrough pain  oxyCODONE    5 mG/acetaminophen 325 mG 2 Tablet(s) Oral every 6 hours PRN Moderate Pain (4 - 6)      FAMILY HISTORY:  Family history of colorectal cancer (Father)  Family history of diabetes mellitus (Mother, Sibling)  Family history of hypertension (Mother)      SOCIAL HISTORY:  ***    REVIEW OF SYSTEM:  Pertinent items are noted in HPI.  Constitutional negative for chills, fevers, sweats and weight loss  throat, and face:  negative for epistaxis, nasal congestion, sore throat and   tinnitus  Respiratory: negative for cough, dyspnea on exertion, pleuritic chest pain  and wheezing  Cardiovascular:  negative for chest pain, dyspnea and palpitations  Gastrointestinal: negative for abdominal pain, diarrhea, nausea and vomiting  Genitourinary: negative for dysuria, frequency and urinary incontinence  Skin:  negative for redness, rash, pruritus, swelling, dryness and   fissures  Hematologic/lymphatic: negative for bleeding and easy bruising  Musculoskeletal: negative for arthralgias, back pain and muscle weakness  Neurological: negative for dizziness, headaches, seizures and tremors  Behavioral/Psych:  negative for mood change, depression, suicidal attempts    Allergic/Immunologic: negative for anaphylaxis, angioedema and urticaria    Vital Signs Last 24 Hrs  T(C): 36.9 (02 May 2019 05:24), Max: 37.3 (01 May 2019 14:34)  T(F): 98.4 (02 May 2019 05:24), Max: 99.2 (01 May 2019 14:34)  HR: 77 (02 May 2019 05:24) (63 - 90)  BP: 129/30 (02 May 2019 05:24) (104/32 - 168/36)  BP(mean): 60 (01 May 2019 16:00) (48 - 60)  RR: 18 (02 May 2019 05:24) (15 - 21)  SpO2: 97% (02 May 2019 05:24) (92% - 98%)    I&O's Summary    PHYSICAL EXAM  General Appearance: cooperative, no acute distress,   HEENT: PERRL, conjunctiva clear, EOM's intact, non injected pharynx, no exudate, TM   normal  Neck: Supple, , no adenopathy, thyroid: not enlarged, no carotid bruit or JVD  Back: Symmetric, no  tenderness,no soft tissue tenderness  Lungs: Clear to auscultation bilateral,no adventitious breath sounds, normal   expiratory phase  Heart: Regular rate and rhythm, S1, S2 normal, no murmur, rub or gallop  Abdomen: Soft, non-tender, bowel sounds active , no hepatosplenomegaly  Extremities: no cyanosis or edema, no joint swelling, hx AKA right side  Skin: Skin color, texture normal, no rashes   Neurologic: Alert and oriented X3 , cranial nerves intact, sensory and motor normal,    ECG:    LABS:                        9.8    16.81 )-----------( 443      ( 01 May 2019 06:35 )             32.1                           9.9    13.16 )-----------( 358      ( 30 Apr 2019 06:08 )             32.1                           10.1   7.96  )-----------( 334      ( 29 Apr 2019 07:08 )             33.2     04-29 04-30    142  |  111<H>  |  19  ----------------------------<  87  3.8   |  24  |  2.70<H>    Ca    8.3<L>      30 Apr 2019 06:08  Phos  3.4     04-29    TPro  x   /  Alb  1.8<L>  /  TBili  x   /  DBili  x   /  AST  x   /  ALT  x   /  AlkPhos  x   04-29  138  |  108  |  35<H>  ----------------------------<  95  4.4   |  22  |  4.61<H>    Ca    8.0<L>      29 Apr 2019 07:08              PT/INR - ( 29 Apr 2019 07:08 )   PT: 39.9 sec;   INR: 3.46 ratio                   RADIOLOGY & ADDITIONAL STUDIES:  < from: CT Chest No Cont (04.28.19 @ 12:17) >  PROCEDURE:   CT of the Chest was performed without intravenous contrast.  Sagittal and coronal reformats were performed.    FINDINGS:    LUNGS AND LARGE AIRWAYS: Emphysema. Continued increase size of tubular   oblong opacity in the right middle lobe, possibly endobronchial mass with   impacted airways. Possible associated broncholiths. Irregular opacity in   the posterior left upper lobe (series 3, image 60), measuring 1.4 cm.  PLEURA: Small left pleural effusion. Trace right pleural effusion.  VESSELS: Atherosclerotic calcifications.   HEART: Heart size is normal. No pericardial effusion.  MEDIASTINUM AND [place]: No lymphadenopathy.  CHEST WALL AND LOWER NECK: Left thyroid nodule, measuring 2.9 cm.   Extension of the thyroid gland into the superior mediastinum.   VISUALIZED UPPER ABDOMEN: Cholelithiasis. Unchanged probable cyst in   hepatic segment 8.  BONES: Degenerative changes of the spine.    IMPRESSION:   Continued increase size of tubular oblong opacity in the right middle   lobe, possibly endobronchial mass with impacted airways. Irregular   opacity in the posterior left upper lobe, measuring 1.4 cm. PET/CT is   recommended for further evaluation.    Left thyroid nodule, measuring 2.9 cm. Further evaluation with thyroid   ultrasound is recommended.    < end of copied text >

## 2019-05-02 NOTE — PROGRESS NOTE ADULT - ASSESSMENT
85 y/o male with ESRD on HD (MWF), HTN, RA on MTX and steroids, R leg amputation, severe CDI and megacolon, Severe PAD s/p LLE endarterectomy 2 weeks ago with dr dugan presents with T 100 and DC from L groin.  Pt also c/o R arm pain/swelling where pt had fistulogram of the AC fistula on last admission.  RUL dopplers negative for DVT.  L groin sono--negative for pseudo aneurysm and + hematoma.  Pt given 2.1 L IVF, IV vanco/aztreonam in ED  On my exam in HD suite, awake, chronically ill appearing, NAD, daughter at bedside. Pt seen by Dr dugan in ED.  CXR--clear    TTE (10/18)--mild-mod MR/EF 60%    Pt d/w daughter regarding advance directives--Pt is FULL RESUSCITATION (19 Apr 2019 16:12)  events noted while admitted muscle flap and plastic surgery care developed increased cough with possible bloody streaked sputuim / none present now and RN at bedside, both noticed thhick yellow sputum production  ct scan findings personally reveiwed / old films are not available on PACS at this time for comparison  Continued increase size of tubular oblong opacity in the right middle   lobe, possibly endobronchial mass with impacted airways. Irregular   opacity in the posterior left upper lobe, measuring 1.4 cm    Assessment / plan:  suspected pneumonia with mucus plugging RML  can not r/o endobronchial lesion  separate FERNANDO pulm nodule seen needs further eval  after antibiotic therapy  pt is not good candidate for bronch at this time  outpt PET / ct is reasonable only after course of antibiotics completed  ESRD on dilaysis now  hx Sepsis, due to unspecified organism: 2/2 poorly healing wounds b/l  Sleep apnea, obstructive: Requires home 02 therapy, and treatment with BIPAP  bibasilar atelectasis with associated small effusions      agree with iv cefepime / vanco  fu imaging  will need BRONCH once INR corrected  also PET as outpt is reasonable and all needs to be further discussed,   with pt and HCP depending on his overall wishes to proceed with workup while he is here  nebulizer  incentive spirometry  may consider repeat ct scan after antibiotic course that would be easier to arrange than outpt PET scan given overall condition  data discussed with pt's GIGIRAnamika  [phone], I've gain explained possibility of malignancy or endobronchial lesion.  I was on the phone with her for 10 mins / all questions are answered.    prior films reviewed with ct scan abd/pelvis done 7/2018 and 10/2018 with similar RLL findings NOW increased in size which raises suspicion for malignancy higher with this finding with possible endobronchial mass also present / all discussed with his DTR / may need BRONCH for diagnostic purposes / will have  eval with Dr Hall today.

## 2019-05-02 NOTE — PROGRESS NOTE ADULT - SUBJECTIVE AND OBJECTIVE BOX
NEPHROLOGY INTERVAL HPI/OVERNIGHT EVENTS:    Date of Service: 05-01-19 @ 09:55    5/2  thoracic surgery/ pulmonary input noted   d/w Pts daughter  tolerating dialysis sessions    5/1 SY  Feeling well today.  No distress noted.    4/30 SY  Events from yesterday reviewed.    Acute agitation and resp distress now improved.  Appearing very comfortable and appropriate this morning.    4/28 SY  Feeling fair.  No new complaints.    4/27 SY  Resting comfortably  No new complaints.  Reports much improved pain in Left groin and foot.    4/26  stable groin flap  in SSD  seen on hd  in good spirits   stable on hd  fluid removal reduced due to low BP  hgb stable    4/25 SY  Feeling fair.  Continues with pain in Left groin and foot.  Pain control tolerable.    4/24 SY  Post Muscle Flap coverage of Left groin wound.  Reports pain in left foot.  Ate breakfast well.  No SOB    4/23  feels much better today  arm less swollen  for OR later washout groin wound  HD in am    4/22 SY  Alert.  No acute distress.  Complains of soreness in Left foot.  Continued pain in Left Groin area.  Right Arm and Elbow pain improved.    HPI:  83 y/o male with ESRD on HD (MWF), HTN, RA on MTX and steroids, R leg amputation, severe CDI and megacolon, Severe PAD s/p LLE endarterectomy 2 weeks ago with dr dugan presents with T 100 and DC from L groin.  Pt also c/o R arm pain/swelling where pt had fistulogram of the AC fistula on last admission.  RUL dopplers negative for DVT.  L groin sono--negative for pseudo aneurysm and + hematoma.  Pt given 2.1 L IVF, IV vanco/aztreonam in ED  On my exam in HD suite, awake, chronically ill appearing, NAD, daughter at bedside. Pt seen by Dr dugan in ED.  CXR--clear    TTE (10/18)--mild-mod MR/EF 60%    Pt d/w daughter regarding advance directives--Pt is FULL RESUSCITATION (19 Apr 2019 16:12)    MEDICATIONS  (STANDING):  allopurinol 100 milliGRAM(s) Oral <User Schedule>  aspirin  chewable 81 milliGRAM(s) Oral daily  atorvastatin 20 milliGRAM(s) Oral at bedtime  cefepime  Injectable. 1000 milliGRAM(s) IV Push daily  cholestyramine Powder (Sugar-Free) 4 Gram(s) Oral daily  DAPTOmycin IVPB      DAPTOmycin IVPB 290 milliGRAM(s) IV Intermittent every 48 hours  diltiazem    Tablet 60 milliGRAM(s) Oral four times a day  doxercalciferol Injectable 1 MICROGram(s) IV Push <User Schedule>  epoetin nelly Injectable 80264 Unit(s) IV Push <User Schedule>  finasteride 5 milliGRAM(s) Oral daily  lidocaine/prilocaine Cream 1 Application(s) Topical daily  pantoprazole    Tablet 40 milliGRAM(s) Oral before breakfast  predniSONE   Tablet 2.5 milliGRAM(s) Oral every other day  predniSONE   Tablet 5 milliGRAM(s) Oral every other day  sevelamer hydrochloride Oral Tab/Cap - Peds 800 milliGRAM(s) Oral three times a day with meals  silver sulfADIAZINE 1% Cream 1 Application(s) Topical daily  sodium chloride 0.9% lock flush 3 milliLiter(s) IV Push every 8 hours  vancomycin    Solution 125 milliGRAM(s) Oral every 6 hours  warfarin 2 milliGRAM(s) Oral once    MEDICATIONS  (PRN):  acetaminophen   Tablet .. 650 milliGRAM(s) Oral every 6 hours PRN Temp greater or equal to 38.5C (101.3F)  acetaminophen   Tablet .. 650 milliGRAM(s) Oral every 6 hours PRN Mild Pain (1 - 3)  ALBUTerol    90 MICROgram(s) HFA Inhaler 2 Puff(s) Inhalation every 6 hours PRN Shortness of Breath and/or Wheezing  guaiFENesin   Syrup  (Sugar-Free) 100 milliGRAM(s) Oral every 6 hours PRN Cough  oxyCODONE    5 mG/acetaminophen 325 mG 1 Tablet(s) Oral every 4 hours PRN Moderate Pain (4 - 6)    I&O's Detail    01 May 2019 07:01  -  02 May 2019 07:00  --------------------------------------------------------  IN:    Oral Fluid: 100 mL  Total IN: 100 mL    OUT:  Total OUT: 0 mL    Total NET: 100 mL      Vital Signs Last 24 Hrs  T(C): 37.2 (02 May 2019 10:49), Max: 37.3 (01 May 2019 14:34)  T(F): 99 (02 May 2019 10:49), Max: 99.2 (01 May 2019 14:34)  HR: 78 (02 May 2019 10:49) (67 - 90)  BP: 155/32 (02 May 2019 10:49) (104/32 - 168/36)  BP(mean): 60 (01 May 2019 16:00) (60 - 60)  RR: 18 (02 May 2019 10:49) (15 - 21)  SpO2: 98% (02 May 2019 10:49) (95% - 98%)      PHYSICAL EXAM:  Alert and comfortable  GENERAL: No distress  CHEST/LUNG: Fair air entry  HEART: S1S2 RRR  ABDOMEN: soft  EXTREMITIES: trace edema  SKIN:                           9.8    16.81 )-----------( 443      ( 01 May 2019 06:35 )             32.1       05-01    140  |  108  |  28<H>  ----------------------------<  87  4.2   |  23  |  3.72<H>    Ca    8.2<L>      01 May 2019 06:35                  RADIOLOGY & ADDITIONAL TESTS:

## 2019-05-02 NOTE — PROGRESS NOTE ADULT - SUBJECTIVE AND OBJECTIVE BOX
No complaints, feeling well  Afebrile, VSS  Left groin incision non healing, with superficial necrosis of medial skin flap.  Central staples d/c'd, and 2 deep sutures removed.  Underlying muscle flap healthy.  wound opened about [id number], packed wet to dry.   Start bid wet to dry dressing changes.

## 2019-05-02 NOTE — PROGRESS NOTE ADULT - SUBJECTIVE AND OBJECTIVE BOX
CHIEF COMPLAINT:    SUBJECTIVE:   85 y/o male with ESRD on HD (MWF), HTN, RA on MTX and steroids, R leg amputation, severe CDI and megacolon, Severe PAD s/p LLE endarterectomy (4/1/19)with dr Clement presents with T 100.5 and DC from L groin.  Pt also c/o R arm pain/swelling where pt had fistulogram of the AC fistula on last admission.    5/2 Pt was seen and examined at bedside. Daughter was present. Pt was complaining of leg pain. Pt has no other complaints at this time.   REVIEW OF SYSTEMS:  CONSTITUTIONAL: No weakness, fevers or chills  EYES/ENT: No visual changes;  No vertigo or throat pain   NECK: No pain or stiffness  RESPIRATORY: No cough, wheezing, hemoptysis; No shortness of breath  CARDIOVASCULAR: No chest pain or palpitations  GASTROINTESTINAL: No abdominal or epigastric pain. No nausea, vomiting, or hematemesis; No diarrhea or constipation. No melena or hematochezia.  GENITOURINARY: No dysuria, frequency or hematuria  NEUROLOGICAL: No numbness or weakness  SKIN: No itching, burning, rashes, or lesions   ext Left lower ext pain  All other review of systems is negative unless indicated above    Vital Signs Last 24 Hrs  T(C): 36.9 (02 May 2019 05:24), Max: 37.3 (01 May 2019 14:34)  T(F): 98.4 (02 May 2019 05:24), Max: 99.2 (01 May 2019 14:34)  HR: 77 (02 May 2019 05:24) (63 - 90)  BP: 129/30 (02 May 2019 05:24) (104/32 - 168/36)  BP(mean): 60 (01 May 2019 16:00) (48 - 60)  RR: 18 (02 May 2019 05:24) (15 - 21)  SpO2: 97% (02 May 2019 05:24) (92% - 98%)    I&O's Summary    01 May 2019 07:01  -  02 May 2019 07:00  --------------------------------------------------------  IN: 100 mL / OUT: 0 mL / NET: 100 mL        CAPILLARY BLOOD GLUCOSE          PHYSICAL EXAM:  Constitutional: NAD, awake and alert, well-developed  HEENT: PERR, EOMI, Normal Hearing, MMM  Neck: Soft and supple, No LAD, No JVD  Respiratory: good air movement, decreased BS on the right, No wheezing, rales or rhonchi  Cardiovascular: S1 and S2, regular rate and rhythm, no Murmurs, gallops or rubs  Gastrointestinal: Bowel Sounds present, soft, nontender, nondistended, no guarding, no rebound  Extremities: No peripheral edema. R BKA, Left surgical site has staples and is clean dry and intact. No drainage or erythema  Vascular: 2+ peripheral pulses  Neurological: A/O x 3, no focal deficits  Skin:  LLE incision site clean and dry, no erythema, no drainage     MEDICATIONS:  MEDICATIONS  (STANDING):  allopurinol 100 milliGRAM(s) Oral <User Schedule>  aspirin  chewable 81 milliGRAM(s) Oral daily  atorvastatin 20 milliGRAM(s) Oral at bedtime  cefepime  Injectable. 1000 milliGRAM(s) IV Push daily  cholestyramine Powder (Sugar-Free) 4 Gram(s) Oral daily  DAPTOmycin IVPB      DAPTOmycin IVPB 290 milliGRAM(s) IV Intermittent every 48 hours  diltiazem    Tablet 60 milliGRAM(s) Oral four times a day  doxercalciferol Injectable 1 MICROGram(s) IV Push <User Schedule>  epoetin nelly Injectable 64544 Unit(s) IV Push <User Schedule>  finasteride 5 milliGRAM(s) Oral daily  lidocaine/prilocaine Cream 1 Application(s) Topical daily  pantoprazole    Tablet 40 milliGRAM(s) Oral before breakfast  predniSONE   Tablet 2.5 milliGRAM(s) Oral every other day  predniSONE   Tablet 5 milliGRAM(s) Oral every other day  sevelamer hydrochloride Oral Tab/Cap - Peds 800 milliGRAM(s) Oral three times a day with meals  silver sulfADIAZINE 1% Cream 1 Application(s) Topical daily  sodium chloride 0.9% lock flush 3 milliLiter(s) IV Push every 8 hours  vancomycin    Solution 125 milliGRAM(s) Oral every 6 hours      LABS: All Labs Reviewed:                        9.8    16.81 )-----------( 443      ( 01 May 2019 06:35 )             32.1     05-01    140  |  108  |  28<H>  ----------------------------<  87  4.2   |  23  |  3.72<H>    Ca    8.2<L>      01 May 2019 06:35      PT/INR - ( 02 May 2019 05:56 )   PT: 31.6 sec;   INR: 2.76 ratio               Blood Culture:     RADIOLOGY/EKG:  < from: US Thyroid + Parathyroid (04.29.19 @ 17:06) >    IMPRESSION:     Dominant left thyroid nodule for which fine-needle aspiration could be   considered. Alternatively 6 month follow-up can be obtained.    < end of copied text >      < from: CT Chest No Cont (04.28.19 @ 12:17) >  IMPRESSION:   Continued increase size of tubular oblong opacity in the right middle   lobe, possibly endobronchial mass with impacted airways. Irregular   opacity in the posterior left upper lobe, measuring 1.4 cm. PET/CT is   recommended for further evaluation.    Left thyroid nodule, measuring 2.9 cm. Further evaluation with thyroid   ultrasound is recommended.    < end of copied text >      < from: Xray Chest 1 View- PORTABLE-Urgent (04.27.19 @ 15:51) >  Impression:    Right lower lobe opacity may represent pneumonia versus neoplasm versus   vascular artifact, recommend correlation with clinical symptoms and   short-term follow-up to resolution    < end of copied text >      < from: US Duplex Arterial Lower Ext Ltd, Left (04.19.19 @ 12:13) >  Impression:    Focal ultrasound of the left groin demonstrates no evidence of   pseudoaneurysm.    Hematoma anterior to the left common femoral artery measuring   approximately 3.2 x 0.9 x 5.0 cm.    There is plaque within the proximal left femoral artery, with an elevated   velocity of 110 cm/s.        < from: US Duplex Venous Upper Ext Ltd, Right (04.19.19 @ 12:16) >  IMPRESSION:     No evidence of right upper extremity deep venous thrombosis.  Markedly increased velocities within the patient's arteriovenous fistula   raising suspicious for hemodynamically significant stenosis.    DVT PPX:    ADVANCED DIRECTIVE:    DISPOSITION:

## 2019-05-02 NOTE — CONSULT NOTE ADULT - ASSESSMENT
83 y/o male previous smoker with ESRD on HD (MWF), CHF, A fib, CAD (stent), COPD (home O2), SHAYY (BIPAP), HTN, HLD, RA (on MTX and steroids), gout, GERD, C diff, megacolon,  s/p R AKA, severe PAD s/p LLE endarterectomy (4/1), s/p Exploration left groin wound, and muscle flap closure (4/23) for wound infection; presents with chronic cough and intermittent hemoptysis, found to have RML opacity likely representing an endobronchial mass and a 1.4 cm FERNANDO opacity on CT Chest.     -Flexible bronchoscopy next week  -Awaiting INR to normalize (2.76 today)  -Continue to hold AC, ok to c/w aspirin. May use DVT ppx when INR subtherapeutic.   -Will need cardiac clearance for general anesthesia  -D/W patient and daughter   -Will notify other daughter Anamika of procedure date when scheduled     Hannah BETANCOURT  Thoracic Surgery   #[id number]

## 2019-05-02 NOTE — CONSULT NOTE ADULT - SUBJECTIVE AND OBJECTIVE BOX
Patient is a 84y old  Male who presents with a chief complaint of L groin DC.    HPI:  85 y/o male with ESRD on HD (MWF), HTN, RA on MTX and steroids, R leg amputation, severe CDI and megacolon, Severe PAD s/p LLE endarterectomy  presents with T 100 and DC from L groin initally.  Pt is planned to have an endobronchial lesion biopsy of the right lung.  He denies any CP or pressure or SOB.    PAST MEDICAL & SURGICAL HISTORY:  Above knee amputation of right lower extremity  Recurrent Clostridium difficile diarrhea  Diastolic CHF  Peripheral vascular disease  Afib  Anemia  CKD (chronic kidney disease)  COPD (chronic obstructive pulmonary disease)  SHAYY (obstructive sleep apnea)  Sepsis, due to unspecified organism: 2/2 poorly healing wounds b/l  Dyspepsia: On moderate exertion.  Sleep apnea, obstructive: Requires home 02 therapy, and treatment with BIPAP  Atelectasis  Pleural effusion, bilateral  Respiratory failure  Peripheral edema  CRI (chronic renal insufficiency)  Gout  Benign prostatic hypertrophy  Spinal stenosis  Hypercholesterolemia  GERD (gastroesophageal reflux disease)  CAD (coronary artery disease)  Hypertension  S/P angioplasty with stent  Cataract of left eye  Prostate: Surgery green light procedure.  S/P rotator cuff surgery: Right  S/P angioplasty      MEDICATIONS  (STANDING):  allopurinol 100 milliGRAM(s) Oral <User Schedule>  aspirin  chewable 81 milliGRAM(s) Oral daily  atorvastatin 20 milliGRAM(s) Oral at bedtime  cholestyramine Powder (Sugar-Free) 4 Gram(s) Oral daily  diltiazem    Tablet 60 milliGRAM(s) Oral four times a day  doxercalciferol Injectable 1 MICROGram(s) IV Push <User Schedule>  epoetin nelly Injectable 80762 Unit(s) IV Push <User Schedule>  finasteride 5 milliGRAM(s) Oral daily  lidocaine/prilocaine Cream 1 Application(s) Topical daily  pantoprazole    Tablet 40 milliGRAM(s) Oral before breakfast  predniSONE   Tablet 2.5 milliGRAM(s) Oral every other day  predniSONE   Tablet 5 milliGRAM(s) Oral every other day  sevelamer hydrochloride Oral Tab/Cap - Peds 800 milliGRAM(s) Oral three times a day with meals  silver sulfADIAZINE 1% Cream 1 Application(s) Topical daily  sodium chloride 0.9% lock flush 3 milliLiter(s) IV Push every 8 hours  warfarin 2 milliGRAM(s) Oral once    MEDICATIONS  (PRN):  acetaminophen   Tablet .. 650 milliGRAM(s) Oral every 6 hours PRN Temp greater or equal to 38.5C (101.3F)  acetaminophen   Tablet .. 650 milliGRAM(s) Oral every 6 hours PRN Mild Pain (1 - 3)  ALBUTerol    90 MICROgram(s) HFA Inhaler 2 Puff(s) Inhalation every 6 hours PRN Shortness of Breath and/or Wheezing  guaiFENesin   Syrup  (Sugar-Free) 100 milliGRAM(s) Oral every 6 hours PRN Cough  oxyCODONE    5 mG/acetaminophen 325 mG 1 Tablet(s) Oral every 4 hours PRN Moderate Pain (4 - 6)      FAMILY HISTORY:  Family history of colorectal cancer (Father)  Family history of diabetes mellitus (Mother, Sibling)  Family history of hypertension (Mother)      SOCIAL HISTORY:  no recent smoking     REVIEW OF SYSTEMS:  CONSTITUTIONAL:  c/o fatigue  HEENT:  Eyes:  No visual changes.     ENT:  No epistaxis.  No sinus pain.    RESPIRATORY:  c/o hemoptysis.  CARDIOVASCULAR:  No chest pains.  No palpitations. No shortness of breath, No orthopnea or PND.  GASTROINTESTINAL:  No abdominal pain.  No nausea or vomiting.    GENITOURINARY:    No hematuria.    MUSCULOSKELETAL:  No musculoskeletal pain.  No joint swelling.  No arthritis.  NEUROLOGICAL:  No tingling or numbness or weakness.  PSYCHIATRIC:  No confusion  SKIN:  brusing , c/o R buttock pain         PHYSICAL EXAM-    Constitutional: no acute distress    Head: Head is normocephalic and atraumatic.      Neck:  No JVD.     Cardiovascular: Regular rate and rhythm without S3, S4. No murmurs or rubs are appreciated.      Respiratory: Breath sounds are normal. No rales. No wheezing.    Abdomen: Soft, nontender, nondistended with positive bowel sounds.      Extremity: No tenderness. No  pitting edema     Neurologic: The patient is alert and oriented.      Skin: No rash, no obvious lesions noted.      Psychiatric: The patient appears to be emotionally stable.      INTERPRETATION OF TELEMETRY: not on     ECG: Sinus rythm, normal axis, no ST T changes     I&O's Detail    01 May 2019 07:01  -  02 May 2019 07:00  --------------------------------------------------------  IN:    Oral Fluid: 100 mL  Total IN: 100 mL    OUT:  Total OUT: 0 mL    Total NET: 100 mL          LABS:                        9.8    16.81 )-----------( 443      ( 01 May 2019 06:35 )             32.1     05-01    140  |  108  |  28<H>  ----------------------------<  87  4.2   |  23  |  3.72<H>    Ca    8.2<L>      01 May 2019 06:35          PT/INR - ( 02 May 2019 05:56 )   PT: 31.6 sec;   INR: 2.76 ratio    < from: Transthoracic Echocardiogram (10.31.18 @ 10:22) >     EXAM:  ECHO TTE WO CON COMP W DOP         PROCEDURE DATE:  10/31/2018        INTERPRETATION:  Transthoracic Echocardiography Report (TTE)     Demographics     Patient name        TERRI NEWTON      Age           83 year(s)     Med Rec #           [id number]         Gender        Male     Account #           [id number]           Date of Birth 12/21/1934     Interpreting        Michele Zamora,   Room Number   0033   Physician           MD Michele Zamora MD     Referring Physician SUK-HYEON YUN MD  Sonographer   Opal Otto                                                       [facility]     Date of study       10/31/2018 09:55                       AM     Height              69 in             Weight     143.3 pounds    Type of Study:     TTE procedure: ECHO TTE WO CON COMP W DOP     BP: 133/40 mmHg     Study Location: [facility] Quality: Fair    Indications   1) I31.3 - Pericardial effusion noninflammatory    M-Mode Measurements (cm)     LVEDd: 5.57 cm            LVESd: 3.34 cm   IVSEd: 1.3 cm   LVPWd: 1.2 cm             AO Root Dimension: 3.5 cm                             ACS: 2.2 cm    Doppler Measurements:      MV Peak E-Wave: 98.2 cm/s    MV Peak A-Wave: 115 cm/s    MV E/A Ratio: 0.85 %    MV Peak Gradient: 3.86 mmHg     Findings     Mitral Valve   Normal appearing mitral valve structure and function.   Mild to Moderate mitral regurgitation is present.   Mild mitral annular calcification is present.     Aortic Valve   Mild aorticsclerosis is present with normal valvular opening.     Tricuspid Valve   Normal appearing tricuspid valve structure and function.   Trace tricuspid valve regurgitation is present.     Pulmonic Valve   Normal appearing pulmonic valve structure and function.   Mild pulmonic valvular regurgitation (1+) is present.     Left Atrium   The left atrium is moderately dilated.     Left Ventricle   The left ventricle is normal in size, wall motion and contractility.   Mild concentric left ventricular hypertrophy is present.   Estimated left ventricular ejection fraction is 60-65 %.     Right Atrium   Normal appearing right atrium.     Right Ventricle   Normal appearing right ventricle structure and function.     Pericardial Effusion   Trivial pericardial effusion is present.     Pleural Effusion   No evidence of pleural effusion.     Miscellaneous   All visualized extra cardiac structures appears to be normal.     Impression     Signature     ----------------------------------------------------------------   Electronically signed by Michele Zamora MD(Interpreting   physician) on 10/31/2018 12:26 PM   ----------------------------------------------------------------    Valves    < end of copied text >           I&O's Summary    01 May 2019 07:01  -  02 May 2019 07:00  --------------------------------------------------------  IN: 100 mL / OUT: 0 mL / NET: 100 mL      BNP  RADIOLOGY & ADDITIONAL STUDIES:  < from: Transthoracic Echocardiogram (10.31.18 @ 10:22) >     EXAM:  ECHO TTE WO CON COMP W DOP         PROCEDURE DATE:  10/31/2018        INTERPRETATION:  Transthoracic Echocardiography Report (TTE)     Demographics     Patient name        TERRI NEWTON      Age           83 year(s)     Med Rec #           [id number]         Gender        Male     Account #           [id number]           Date of Birth 12/21/1934     Interpreting        Michele Zamora,   Room Number   0033   Physician           MD Michele Zamora MD     Referring Physician SUK-HYEON YUN MD  Sonographrema Otto                                                       [facility]     Date of study       10/31/2018 09:55                       AM     Height              69 in             Weight     143.3 pounds    Type of Study:     TTE procedure: ECHO TTE WO CON COMP W DOP     BP: 133/40 mmHg     Study Location: [facility] Quality: Fair    Indications   1) I31.3 - Pericardial effusion noninflammatory    M-Mode Measurements (cm)     LVEDd: 5.57 cm            LVESd: 3.34 cm   IVSEd: 1.3 cm   LVPWd: 1.2 cm             AO Root Dimension: 3.5 cm                             ACS: 2.2 cm    Doppler Measurements:      MV Peak E-Wave: 98.2 cm/s    MV Peak A-Wave: 115 cm/s    MV E/A Ratio: 0.85 %    MV Peak Gradient: 3.86 mmHg     Findings     Mitral Valve   Normal appearing mitral valve structure and function.   Mild to Moderate mitral regurgitation is present.   Mild mitral annular calcification is present.     Aortic Valve   Mild aorticsclerosis is present with normal valvular opening.     Tricuspid Valve   Normal appearing tricuspid valve structure and function.   Trace tricuspid valve regurgitation is present.     Pulmonic Valve   Normal appearing pulmonic valve structure and function.   Mild pulmonic valvular regurgitation (1+) is present.     Left Atrium   The left atrium is moderately dilated.     Left Ventricle   The left ventricle is normal in size, wall motion and contractility.   Mild concentric left ventricular hypertrophy is present.   Estimated left ventricular ejection fraction is 60-65 %.     Right Atrium   Normal appearing right atrium.     Right Ventricle   Normal appearing right ventricle structure and function.     Pericardial Effusion   Trivial pericardial effusion is present.     Pleural Effusion   No evidence of pleural effusion.     Miscellaneous   All visualized extra cardiac structures appears to be normal.     Impression     Signature     ----------------------------------------------------------------   Electronically signed by Michele Zamora MD(Interpreting   physician) on 10/31/2018 12:26 PM   ----------------------------------------------------------------    < end of copied text >

## 2019-05-02 NOTE — PROGRESS NOTE ADULT - ASSESSMENT
83 y/o male with ESRD on HD (MWF), HTN, RA on MTX and steroids, R leg amputation, severe CDI and megacolon, Severe PAD s/p LLE endarterectomy 2 weeks ago with dr dugan presents with T 100 and DC from L groin.  Pt also c/o R arm pain/swelling where pt had fistulogram of the AC fistula on last admission.  RUL dopplers negative for DVT.  L groin sono--negative for pseudo aneurysm and + hematoma.  Pt given 2.1 L IVF, IV vanco/aztreonam in ED  On my exam in HD suite, awake, chronically ill appearing, NAD, daughter at bedside. Pt seen by Dr dugan in ED.  CXR--clear    4/20  s/p hd yesterday  prolonged bleed from access stopped w pressure  feels well  arm less tender  received 1 u prbc on hd yesterday  monitor cbc    4/22 SY  --ESRD : HD order and tx reviewed.   Tolerating tx well.  --AVF : as above.  Prolonged bleeding post tx.  Venous pressure acceptable today.  Monitor AVF function.  --Anemia : with acute loss.  Active infection leading to LETICIA hyporesponsiveness.  Transfuse 2 units today.  --PAD : post Left Ileofemoral Endarterectomy : Monitor Left foot perfusion.  --ID ; Left Groin infection : Continue abtx.  For debridement in am.    4/23  as above  For HD Wednesday  groin wash out today    4/24 SY  --ESRD : HD order and tx reviewed.  Tolerating tx well.  AVF cannulated without difficulty.  --Left Groin wound --Post  Muscle Flap Coverage.  Continue abtx.  --PAD : monitor perfusion.  --ID : continue Cefepime.    4/25 SY  --ESRD : Continue TIW HD  --AVF : cannulated without difficulty.  RUE edema much improved.  --Left Groin wound : post Muscle Flap Coverage.   Continue abtx.  --ID: continue abtx.  --PAD : post Left Ileofemoral endarterectomy : monitor perfusion.    4/26  seen on hd  in good spirits   stable on hd  fluid removal reduced due to low BP  hgb stable    4/27 SY  --ESRD : Continue TIW HD  --AVF : functioning well.  --PAD : Post Left Ileofemoral Endarterectomy : monitor perfusion  --Left Groin Wound : post Muscle Flap Coverage : continue abtx and wound care.    4/28 SY  --ESRD : for HD in am  --AVF : functioning well with improved cannulation.  --PAD : monitor perfusion ( Post Left Ileofemoral Endarterectomy)  --Left Groin wound : post Muscle Flap : continue abtx and wound care.    4/29 MK   - ESRD: tolerting hd, AVF cannultion well  - inc sputum production with possible endobronchial mass: dr bush input noted, may need bronch  - AMS with more confusion and jerking motion: pain meds have been limited, will get ammonia and abg value, has not been using bipap during hospitalization  - Gibson: fu h/h   - left groin wound s/p muscle flap coverage with s/p left ileofemoral endarterectomy      4/30 SY  --ESRD : Continue TIW HD  --Pulm : Hemoptysis resolved.  New RML PNA and FERNANDO nodule   improved resp status . Now PNA with mucus plugging and new findings of lesion.  For outpt work up once clinically improved.  --AMS ;resolved and at baseline.  Attempt to minimize pain meds.  --PAD : Monitor Left groin and LE wound.  --ID : continue Cefepime and Daptomycin with po vanco for C diff prophylaxis.    5/1 SY  --ESRD :HD order and tx reviewed.    --Pulm : New RML PNA and FERNANDO nodule.  Pulmonary follow up appreciated.  Continue ABTX.  --AMS : resolved and stable.  --Increasing Leukocytosis : continue abtx.  D/w Dr Flanagan.  --PAD : LLE pain improved. (post Left Ileofemoral endarterectomy last admission.    5/2  Some av access arm pain last hd improved w repositioning  stable vitals  pulmonary/ thoracic surgery input noted  d/w Pt daughter

## 2019-05-02 NOTE — CONSULT NOTE ADULT - ASSESSMENT
Preoperative cardiac evaluation- Pt denies cardiac symptoms currently.  he has extensive history   Patient is a moderate risk patient for a moderate risk procedure with poor functional tolerance.  Patient can proceed with anticipated procedure without any further cardiac testing.  Patient is optimized from cardiac standpoint.  He is not currently on betablockers and he is on cardizem.  CLose monitoring of the volume status periprocedurally.    Atrial fibrillation with high CHADVasc score is high.  Will start heparin drip once INR <2.  Discussed with Dr Rea and he expressed the need for holding since pt is going to have biopsy.    CAD s/p PCI- continue current meds.    Other medical issues- Management per primary team.   Thank you for allowing me to participate in the care of this patient. Please feel free to contact me with any questions.

## 2019-05-02 NOTE — PROGRESS NOTE ADULT - PROBLEM SELECTOR PLAN 1
Pt who had an endarterectomy April 1st who developed an infection at entry site and fever of 100.5  -now afebrile  -dopplers of the LLE showed hematoma anterior to the left common femoral artery measuring approximately 3.2 x 0.9 x 5.0 cm  -Dr. Clement recommendation appreciated:Debridement done.   Pain control w/ tylenol, tramadol if needed and then possibly percocet. Hold dilaudid can make drowsy. Only when absolutely necessary.  s/p aztreonam  -continue cefepime Day #10, Vancomycin for c diff prophylaxis Day #12, Daptomycin added Day #8  -wound care  -wound cx growing E faecium. Vanc resistant  -ID consult appreciated  -WBC increasing will follow up with ID about coverage.   -wound site not showing signs of infection. Possibly 2/2 from lung mass Pt who had an endarterectomy April 1st who developed an infection at entry site and fever of 100.5  -now afebrile  -dopplers of the LLE showed hematoma anterior to the left common femoral artery measuring approximately 3.2 x 0.9 x 5.0 cm  -Dr. Clement recommendation appreciated:Debridement done.   Pain control w/ tylenol, tramadol if needed and then possibly percocet. Hold dilaudid can make drowsy. Only when absolutely necessary.  -wound cx growing E faecium. Vanc resistant   s/p aztreonam 4 days  -Discussed w/ ID and agree that the patient has completed his abxs course for the VRE coverage at the wound site and PNA  -s/p daptomycin 8days, s/p vancomycin, s/p cefepime 10 days  - continue wound care

## 2019-05-03 LAB
ALBUMIN SERPL ELPH-MCNC: 2 G/DL — LOW (ref 3.3–5)
ANION GAP SERPL CALC-SCNC: 7 MMOL/L — SIGNIFICANT CHANGE UP (ref 5–17)
BUN SERPL-MCNC: 31 MG/DL — HIGH (ref 7–23)
CALCIUM SERPL-MCNC: 8.1 MG/DL — LOW (ref 8.5–10.1)
CHLORIDE SERPL-SCNC: 104 MMOL/L — SIGNIFICANT CHANGE UP (ref 96–108)
CO2 SERPL-SCNC: 25 MMOL/L — SIGNIFICANT CHANGE UP (ref 22–31)
CREAT SERPL-MCNC: 3.77 MG/DL — HIGH (ref 0.5–1.3)
GLUCOSE SERPL-MCNC: 114 MG/DL — HIGH (ref 70–99)
HCT VFR BLD CALC: 32.7 % — LOW (ref 39–50)
HGB BLD-MCNC: 10.4 G/DL — LOW (ref 13–17)
INR BLD: 3.37 RATIO — HIGH (ref 0.88–1.16)
MCHC RBC-ENTMCNC: 31.4 PG — SIGNIFICANT CHANGE UP (ref 27–34)
MCHC RBC-ENTMCNC: 31.8 GM/DL — LOW (ref 32–36)
MCV RBC AUTO: 98.8 FL — SIGNIFICANT CHANGE UP (ref 80–100)
NRBC # BLD: 0 /100 WBCS — SIGNIFICANT CHANGE UP (ref 0–0)
PHOSPHATE SERPL-MCNC: 3.3 MG/DL — SIGNIFICANT CHANGE UP (ref 2.5–4.5)
PLATELET # BLD AUTO: 403 K/UL — HIGH (ref 150–400)
POTASSIUM SERPL-MCNC: 3.9 MMOL/L — SIGNIFICANT CHANGE UP (ref 3.5–5.3)
POTASSIUM SERPL-SCNC: 3.9 MMOL/L — SIGNIFICANT CHANGE UP (ref 3.5–5.3)
PROTHROM AB SERPL-ACNC: 38.8 SEC — HIGH (ref 10–12.9)
RBC # BLD: 3.31 M/UL — LOW (ref 4.2–5.8)
RBC # FLD: 21.2 % — HIGH (ref 10.3–14.5)
SODIUM SERPL-SCNC: 136 MMOL/L — SIGNIFICANT CHANGE UP (ref 135–145)
WBC # BLD: 14.54 K/UL — HIGH (ref 3.8–10.5)
WBC # FLD AUTO: 14.54 K/UL — HIGH (ref 3.8–10.5)

## 2019-05-03 PROCEDURE — 99232 SBSQ HOSP IP/OBS MODERATE 35: CPT

## 2019-05-03 PROCEDURE — 71045 X-RAY EXAM CHEST 1 VIEW: CPT | Mod: 26

## 2019-05-03 RX ADMIN — SODIUM CHLORIDE 3 MILLILITER(S): 9 INJECTION INTRAMUSCULAR; INTRAVENOUS; SUBCUTANEOUS at 05:35

## 2019-05-03 RX ADMIN — DOXERCALCIFEROL 1 MICROGRAM(S): 2.5 CAPSULE ORAL at 16:33

## 2019-05-03 RX ADMIN — Medication 60 MILLIGRAM(S): at 23:06

## 2019-05-03 RX ADMIN — ATORVASTATIN CALCIUM 20 MILLIGRAM(S): 80 TABLET, FILM COATED ORAL at 21:21

## 2019-05-03 RX ADMIN — Medication 2.5 MILLIGRAM(S): at 11:38

## 2019-05-03 RX ADMIN — SEVELAMER CARBONATE 800 MILLIGRAM(S): 2400 POWDER, FOR SUSPENSION ORAL at 11:40

## 2019-05-03 RX ADMIN — ERYTHROPOIETIN 10000 UNIT(S): 10000 INJECTION, SOLUTION INTRAVENOUS; SUBCUTANEOUS at 16:34

## 2019-05-03 RX ADMIN — OXYCODONE AND ACETAMINOPHEN 1 TABLET(S): 5; 325 TABLET ORAL at 15:11

## 2019-05-03 RX ADMIN — Medication 81 MILLIGRAM(S): at 11:39

## 2019-05-03 RX ADMIN — FINASTERIDE 5 MILLIGRAM(S): 5 TABLET, FILM COATED ORAL at 11:38

## 2019-05-03 RX ADMIN — Medication 60 MILLIGRAM(S): at 19:02

## 2019-05-03 RX ADMIN — PANTOPRAZOLE SODIUM 40 MILLIGRAM(S): 20 TABLET, DELAYED RELEASE ORAL at 05:33

## 2019-05-03 RX ADMIN — Medication 1 APPLICATION(S): at 11:40

## 2019-05-03 RX ADMIN — SODIUM CHLORIDE 3 MILLILITER(S): 9 INJECTION INTRAMUSCULAR; INTRAVENOUS; SUBCUTANEOUS at 21:20

## 2019-05-03 RX ADMIN — CHOLESTYRAMINE 4 GRAM(S): 4 POWDER, FOR SUSPENSION ORAL at 08:41

## 2019-05-03 RX ADMIN — OXYCODONE AND ACETAMINOPHEN 1 TABLET(S): 5; 325 TABLET ORAL at 15:54

## 2019-05-03 RX ADMIN — Medication 60 MILLIGRAM(S): at 11:38

## 2019-05-03 RX ADMIN — SEVELAMER CARBONATE 800 MILLIGRAM(S): 2400 POWDER, FOR SUSPENSION ORAL at 08:41

## 2019-05-03 RX ADMIN — Medication 60 MILLIGRAM(S): at 05:32

## 2019-05-03 RX ADMIN — LIDOCAINE AND PRILOCAINE CREAM 1 APPLICATION(S): 25; 25 CREAM TOPICAL at 11:37

## 2019-05-03 RX ADMIN — OXYCODONE AND ACETAMINOPHEN 1 TABLET(S): 5; 325 TABLET ORAL at 05:34

## 2019-05-03 NOTE — PROGRESS NOTE ADULT - SUBJECTIVE AND OBJECTIVE BOX
Patient is a 84y old  Male who presents with a chief complaint of L groin DC (28 Apr 2019 17:14)      HPI:  85 y/o male with ESRD on HD (MWF), HTN, RA on MTX and steroids, R leg amputation, severe CDI and megacolon, Severe PAD s/p LLE endarterectomy 2 weeks ago with dr dugan presents with T 100 and DC from L groin.  Pt also c/o R arm pain/swelling where pt had fistulogram of the AC fistula on last admission.  RUL dopplers negative for DVT.  L groin sono--negative for pseudo aneurysm and + hematoma.  Pt given 2.1 L IVF, IV vanco/aztreonam in ED  On my exam in HD suite, awake, chronically ill appearing, NAD, daughter at bedside. Pt seen by Dr dugan in ED.  CXR--clear    TTE (10/18)--mild-mod MR/EF 60%    Pt d/w daughter regarding advance directives--Pt is FULL RESUSCITATION (19 Apr 2019 16:12)  events noted while admitted muscle flap and plastic surgery care developed increased cough with possible bloody streaked sputuim / none present now and RN at bedside, both noticed thhick yellow sputum production    4/30  data discussed with RN at bedside  no cp  improved breathing    5/1  feels the same  decreased cough  DTR is contacted and data discussed regarding abnormal ct scan findings  5/2  data discussed with pt's DTR and medical team as well  although BRONCH is appropriate given ct scan findings, it can not be done yet till INR corrected specially in pt with ESRD.  DATA DISCUSSED WITH DR JORGE MASSEY AS WELL for thoracic surgery consult  5/3  data discussed with thoracic surgery yesterday  discussed with dr Rashid today  pt is resting comfortably  no resp distress  no hemoptysis  PAST MEDICAL & SURGICAL HISTORY:  Above knee amputation of right lower extremity  Recurrent Clostridium difficile diarrhea  Diastolic CHF  Peripheral vascular disease  Afib  Anemia  CKD (chronic kidney disease)  COPD (chronic obstructive pulmonary disease)  SHAYY (obstructive sleep apnea)  Sepsis, due to unspecified organism: 2/2 poorly healing wounds b/l  Dyspepsia: On moderate exertion.  Sleep apnea, obstructive: Requires home 02 therapy, and treatment with BIPAP  Atelectasis  Pleural effusion, bilateral  Respiratory failure  Peripheral edema  CRI (chronic renal insufficiency)  Gout  Benign prostatic hypertrophy  Spinal stenosis  Hypercholesterolemia  GERD (gastroesophageal reflux disease)  CAD (coronary artery disease)  Hypertension  S/P angioplasty with stent  Cataract of left eye  Prostate: Surgery green light procedure.  S/P rotator cuff surgery: Right  S/P angioplasty      PREVIOUS DIAGNOSTIC TESTING:      MEDICATIONS  (STANDING):  allopurinol 100 milliGRAM(s) Oral <User Schedule>  aspirin  chewable 81 milliGRAM(s) Oral daily  atorvastatin 20 milliGRAM(s) Oral at bedtime  cefepime  Injectable. 1000 milliGRAM(s) IV Push daily  cholestyramine Powder (Sugar-Free) 4 Gram(s) Oral daily  DAPTOmycin IVPB      DAPTOmycin IVPB 290 milliGRAM(s) IV Intermittent every 48 hours  diltiazem    Tablet 60 milliGRAM(s) Oral four times a day  doxercalciferol Injectable 1 MICROGram(s) IV Push <User Schedule>  epoetin nelly Injectable 23748 Unit(s) IV Push <User Schedule>  finasteride 5 milliGRAM(s) Oral daily  lidocaine/prilocaine Cream 1 Application(s) Topical daily  pantoprazole    Tablet 40 milliGRAM(s) Oral before breakfast  predniSONE   Tablet 2.5 milliGRAM(s) Oral every other day  predniSONE   Tablet 5 milliGRAM(s) Oral every other day  sevelamer hydrochloride Oral Tab/Cap - Peds 800 milliGRAM(s) Oral three times a day with meals  silver sulfADIAZINE 1% Cream 1 Application(s) Topical daily  sodium chloride 0.9% lock flush 3 milliLiter(s) IV Push every 8 hours  vancomycin    Solution 125 milliGRAM(s) Oral every 6 hours      MEDICATIONS  (PRN):  acetaminophen   Tablet .. 650 milliGRAM(s) Oral every 6 hours PRN Temp greater or equal to 38.5C (101.3F)  acetaminophen   Tablet .. 650 milliGRAM(s) Oral every 6 hours PRN Mild Pain (1 - 3)  HYDROmorphone  Injectable 0.5 milliGRAM(s) IV Push every 4 hours PRN breakthrough pain  oxyCODONE    5 mG/acetaminophen 325 mG 2 Tablet(s) Oral every 6 hours PRN Moderate Pain (4 - 6)      FAMILY HISTORY:  Family history of colorectal cancer (Father)  Family history of diabetes mellitus (Mother, Sibling)  Family history of hypertension (Mother)      SOCIAL HISTORY:  ***    REVIEW OF SYSTEM:  Pertinent items are noted in HPI.  Constitutional negative for chills, fevers, sweats and weight loss  throat, and face:  negative for epistaxis, nasal congestion, sore throat and   tinnitus  Respiratory: negative for cough, dyspnea on exertion, pleuritic chest pain  and wheezing  Cardiovascular:  negative for chest pain, dyspnea and palpitations  Gastrointestinal: negative for abdominal pain, diarrhea, nausea and vomiting  Genitourinary: negative for dysuria, frequency and urinary incontinence  Skin:  negative for redness, rash, pruritus, swelling, dryness and   fissures  Hematologic/lymphatic: negative for bleeding and easy bruising  Musculoskeletal: negative for arthralgias, back pain and muscle weakness  Neurological: negative for dizziness, headaches, seizures and tremors  Behavioral/Psych:  negative for mood change, depression, suicidal attempts    Allergic/Immunologic: negative for anaphylaxis, angioedema and urticaria    Vital Signs Last 24 Hrs  T(C): 36.5 (03 May 2019 05:38), Max: 37.2 (02 May 2019 10:49)  T(F): 97.7 (03 May 2019 05:38), Max: 99 (02 May 2019 10:49)  HR: 69 (03 May 2019 05:38) (69 - 78)  BP: 123/43 (03 May 2019 05:38) (108/32 - 155/32)  BP(mean): --  RR: 18 (03 May 2019 05:38) (17 - 18)  SpO2: 94% (03 May 2019 05:38) (94% - 99%)  I&O's Summary    PHYSICAL EXAM  General Appearance: cooperative, no acute distress,   HEENT: PERRL, conjunctiva clear, EOM's intact, non injected pharynx, no exudate, TM   normal  Neck: Supple, , no adenopathy, thyroid: not enlarged, no carotid bruit or JVD  Back: Symmetric, no  tenderness,no soft tissue tenderness  Lungs: Clear to auscultation bilateral,no adventitious breath sounds, normal   expiratory phase  Heart: Regular rate and rhythm, S1, S2 normal, no murmur, rub or gallop  Abdomen: Soft, non-tender, bowel sounds active , no hepatosplenomegaly  Extremities: no cyanosis or edema, no joint swelling, hx AKA right side  Skin: Skin color, texture normal, no rashes   Neurologic: Alert and oriented X3 , cranial nerves intact, sensory and motor normal,    ECG:    LABS:                        9.8    16.81 )-----------( 443      ( 01 May 2019 06:35 )             32.1                           9.9    13.16 )-----------( 358      ( 30 Apr 2019 06:08 )             32.1                           10.1   7.96  )-----------( 334      ( 29 Apr 2019 07:08 )             33.2     04-29 04-30    142  |  111<H>  |  19  ----------------------------<  87  3.8   |  24  |  2.70<H>    Ca    8.3<L>      30 Apr 2019 06:08  Phos  3.4     04-29    TPro  x   /  Alb  1.8<L>  /  TBili  x   /  DBili  x   /  AST  x   /  ALT  x   /  AlkPhos  x   04-29  138  |  108  |  35<H>  ----------------------------<  95  4.4   |  22  |  4.61<H>    Ca    8.0<L>      29 Apr 2019 07:08              PT/INR - ( 29 Apr 2019 07:08 )   PT: 39.9 sec;   INR: 3.46 ratio                   RADIOLOGY & ADDITIONAL STUDIES:  < from: CT Chest No Cont (04.28.19 @ 12:17) >  PROCEDURE:   CT of the Chest was performed without intravenous contrast.  Sagittal and coronal reformats were performed.    FINDINGS:    LUNGS AND LARGE AIRWAYS: Emphysema. Continued increase size of tubular   oblong opacity in the right middle lobe, possibly endobronchial mass with   impacted airways. Possible associated broncholiths. Irregular opacity in   the posterior left upper lobe (series 3, image 60), measuring 1.4 cm.  PLEURA: Small left pleural effusion. Trace right pleural effusion.  VESSELS: Atherosclerotic calcifications.   HEART: Heart size is normal. No pericardial effusion.  MEDIASTINUM AND [place]: No lymphadenopathy.  CHEST WALL AND LOWER NECK: Left thyroid nodule, measuring 2.9 cm.   Extension of the thyroid gland into the superior mediastinum.   VISUALIZED UPPER ABDOMEN: Cholelithiasis. Unchanged probable cyst in   hepatic segment 8.  BONES: Degenerative changes of the spine.    IMPRESSION:   Continued increase size of tubular oblong opacity in the right middle   lobe, possibly endobronchial mass with impacted airways. Irregular   opacity in the posterior left upper lobe, measuring 1.4 cm. PET/CT is   recommended for further evaluation.    Left thyroid nodule, measuring 2.9 cm. Further evaluation with thyroid   ultrasound is recommended.    < end of copied text >

## 2019-05-03 NOTE — PROGRESS NOTE ADULT - SUBJECTIVE AND OBJECTIVE BOX
Cardiology Progress Note    HPI: 83 y/o male with ESRD on HD (MWF), HTN, RA on MTX and steroids, R leg amputation, severe CDI and megacolon, Severe PAD s/p LLE endarterectomy  presents with T 100 and DC from L groin initally.  Pt is planned to have an endobronchial lesion biopsy of the right lung.    5/3. No CP/SOB. No events last pm. Awaiting surgery. Not on tele.     PAST MEDICAL & SURGICAL HISTORY:  Above knee amputation of right lower extremity  Recurrent Clostridium difficile diarrhea  Diastolic CHF  Peripheral vascular disease  Afib  Anemia  CKD (chronic kidney disease)  COPD (chronic obstructive pulmonary disease)  SHAYY (obstructive sleep apnea)  Sepsis, due to unspecified organism: 2/2 poorly healing wounds b/l  Dyspepsia: On moderate exertion.  Sleep apnea, obstructive: Requires home 02 therapy, and treatment with BIPAP  Atelectasis  Pleural effusion, bilateral  Respiratory failure  Peripheral edema  CRI (chronic renal insufficiency)  Gout  Benign prostatic hypertrophy  Spinal stenosis  Hypercholesterolemia  GERD (gastroesophageal reflux disease)  CAD (coronary artery disease)  Hypertension  S/P angioplasty with stent  Cataract of left eye  Prostate: Surgery green light procedure.  S/P rotator cuff surgery: Right  S/P angioplasty      MEDICATIONS  (STANDING):  allopurinol 100 milliGRAM(s) Oral <User Schedule>  aspirin  chewable 81 milliGRAM(s) Oral daily  atorvastatin 20 milliGRAM(s) Oral at bedtime  cholestyramine Powder (Sugar-Free) 4 Gram(s) Oral daily  diltiazem    Tablet 60 milliGRAM(s) Oral four times a day  doxercalciferol Injectable 1 MICROGram(s) IV Push <User Schedule>  epoetin nelly Injectable 27699 Unit(s) IV Push <User Schedule>  finasteride 5 milliGRAM(s) Oral daily  lidocaine/prilocaine Cream 1 Application(s) Topical daily  pantoprazole    Tablet 40 milliGRAM(s) Oral before breakfast  predniSONE   Tablet 2.5 milliGRAM(s) Oral every other day  predniSONE   Tablet 5 milliGRAM(s) Oral every other day  sevelamer hydrochloride Oral Tab/Cap - Peds 800 milliGRAM(s) Oral three times a day with meals  silver sulfADIAZINE 1% Cream 1 Application(s) Topical daily  sodium chloride 0.9% lock flush 3 milliLiter(s) IV Push every 8 hours  warfarin 2 milliGRAM(s) Oral once    MEDICATIONS  (PRN):  acetaminophen   Tablet .. 650 milliGRAM(s) Oral every 6 hours PRN Temp greater or equal to 38.5C (101.3F)  acetaminophen   Tablet .. 650 milliGRAM(s) Oral every 6 hours PRN Mild Pain (1 - 3)  ALBUTerol    90 MICROgram(s) HFA Inhaler 2 Puff(s) Inhalation every 6 hours PRN Shortness of Breath and/or Wheezing  guaiFENesin   Syrup  (Sugar-Free) 100 milliGRAM(s) Oral every 6 hours PRN Cough  oxyCODONE    5 mG/acetaminophen 325 mG 1 Tablet(s) Oral every 4 hours PRN Moderate Pain (4 - 6)      FAMILY HISTORY:  Family history of colorectal cancer (Father)  Family history of diabetes mellitus (Mother, Sibling)  Family history of hypertension (Mother)      SOCIAL HISTORY:  no recent smoking     REVIEW OF SYSTEMS:  CONSTITUTIONAL:  c/o fatigue  HEENT:  Eyes:  No visual changes.     ENT:  No epistaxis.  No sinus pain.    RESPIRATORY:  c/o hemoptysis.  CARDIOVASCULAR:  No chest pains.  No palpitations. No shortness of breath, No orthopnea or PND.  GASTROINTESTINAL:  No abdominal pain.  No nausea or vomiting.    GENITOURINARY:    No hematuria.    MUSCULOSKELETAL:  No musculoskeletal pain.  No joint swelling.  No arthritis.  NEUROLOGICAL:  No tingling or numbness or weakness.  PSYCHIATRIC:  No confusion  SKIN:  brusing , c/o R buttock pain         PHYSICAL EXAM-    Constitutional: no acute distress    Head: Head is normocephalic and atraumatic.      Neck:  No JVD.     Cardiovascular: Regular rate and rhythm without S3, S4. No murmurs or rubs are appreciated.      Respiratory: Breath sounds are normal. No rales. No wheezing.    Abdomen: Soft, nontender, nondistended with positive bowel sounds.      Extremity: No tenderness. No  pitting edema     Neurologic: The patient is alert and oriented.      Skin: No rash, no obvious lesions noted.      Psychiatric: The patient appears to be emotionally stable.      INTERPRETATION OF TELEMETRY: not on     ECG: Sinus rythm, normal axis, no ST T changes     I&O's Detail    01 May 2019 07:01  -  02 May 2019 07:00  --------------------------------------------------------  IN:    Oral Fluid: 100 mL  Total IN: 100 mL    OUT:  Total OUT: 0 mL    Total NET: 100 mL          LABS:                        9.8    16.81 )-----------( 443      ( 01 May 2019 06:35 )             32.1     05-01    140  |  108  |  28<H>  ----------------------------<  87  4.2   |  23  |  3.72<H>    Ca    8.2<L>      01 May 2019 06:35          PT/INR - ( 02 May 2019 05:56 )   PT: 31.6 sec;   INR: 2.76 ratio    < from: Transthoracic Echocardiogram (10.31.18 @ 10:22) >     EXAM:  ECHO TTE WO CON COMP W DOP         PROCEDURE DATE:  10/31/2018        INTERPRETATION:  Transthoracic Echocardiography Report (TTE)     Demographics     Patient name        TERRI NEWTON      Age           83 year(s)     Med Rec #           [id number]         Gender        Male     Account #           [id number]           Date of Birth 12/21/1934     Interpreting        Michele Zamora,   Room Number   0033   Physician           MD Michele Zamora MD     Referring Physician SUK-HYEON YUN MD  Sonographer   Opal Otto                                                       [facility]     Date of study       10/31/2018 09:55                       AM     Height              69 in             Weight     143.3 pounds    Type of Study:     TTE procedure: ECHO TTE WO CON COMP W DOP     BP: 133/40 mmHg     Study Location: [facility] Quality: Fair    Indications   1) I31.3 - Pericardial effusion noninflammatory    M-Mode Measurements (cm)     LVEDd: 5.57 cm            LVESd: 3.34 cm   IVSEd: 1.3 cm   LVPWd: 1.2 cm             AO Root Dimension: 3.5 cm                             ACS: 2.2 cm    Doppler Measurements:      MV Peak E-Wave: 98.2 cm/s    MV Peak A-Wave: 115 cm/s    MV E/A Ratio: 0.85 %    MV Peak Gradient: 3.86 mmHg     Findings     Mitral Valve   Normal appearing mitral valve structure and function.   Mild to Moderate mitral regurgitation is present.   Mild mitral annular calcification is present.     Aortic Valve   Mild aorticsclerosis is present with normal valvular opening.     Tricuspid Valve   Normal appearing tricuspid valve structure and function.   Trace tricuspid valve regurgitation is present.     Pulmonic Valve   Normal appearing pulmonic valve structure and function.   Mild pulmonic valvular regurgitation (1+) is present.     Left Atrium   The left atrium is moderately dilated.     Left Ventricle   The left ventricle is normal in size, wall motion and contractility.   Mild concentric left ventricular hypertrophy is present.   Estimated left ventricular ejection fraction is 60-65 %.     Right Atrium   Normal appearing right atrium.     Right Ventricle   Normal appearing right ventricle structure and function.     Pericardial Effusion   Trivial pericardial effusion is present.     Pleural Effusion   No evidence of pleural effusion.     Miscellaneous   All visualized extra cardiac structures appears to be normal.     Impression     Signature     ----------------------------------------------------------------   Electronically signed by Michele Zamora MD(Interpreting   physician) on 10/31/2018 12:26 PM   ----------------------------------------------------------------    Valves    < end of copied text >           I&O's Summary    01 May 2019 07:01  -  02 May 2019 07:00  --------------------------------------------------------  IN: 100 mL / OUT: 0 mL / NET: 100 mL      BNP  RADIOLOGY & ADDITIONAL STUDIES:  < from: Transthoracic Echocardiogram (10.31.18 @ 10:22) >     EXAM:  ECHO TTE WO CON COMP W DOP         PROCEDURE DATE:  10/31/2018        INTERPRETATION:  Transthoracic Echocardiography Report (TTE)     Demographics     Patient name        TERRI NEWTON      Age           83 year(s)     Med Rec #           [id number]         Gender        Male     Account #           [id number]           Date of Birth 12/21/1934     Interpreting        Michele Zamora,   Room Number   0033   Physician           MD Michele Zamora MD     Referring Physician SUK-HYEON YUN MD  Sonographer   Opal Otto,                                                       [facility]     Date of study       10/31/2018 09:55                       AM     Height              69 in             Weight     143.3 pounds    Type of Study:     TTE procedure: ECHO TTE WO CON COMP W DOP     BP: 133/40 mmHg     Study Location: [facility] Quality: Fair    Indications   1) I31.3 - Pericardial effusion noninflammatory    M-Mode Measurements (cm)     LVEDd: 5.57 cm            LVESd: 3.34 cm   IVSEd: 1.3 cm   LVPWd: 1.2 cm             AO Root Dimension: 3.5 cm                             ACS: 2.2 cm    Doppler Measurements:      MV Peak E-Wave: 98.2 cm/s    MV Peak A-Wave: 115 cm/s    MV E/A Ratio: 0.85 %    MV Peak Gradient: 3.86 mmHg     Findings     Mitral Valve   Normal appearing mitral valve structure and function.   Mild to Moderate mitral regurgitation is present.   Mild mitral annular calcification is present.     Aortic Valve   Mild aorticsclerosis is present with normal valvular opening.     Tricuspid Valve   Normal appearing tricuspid valve structure and function.   Trace tricuspid valve regurgitation is present.     Pulmonic Valve   Normal appearing pulmonic valve structure and function.   Mild pulmonic valvular regurgitation (1+) is present.     Left Atrium   The left atrium is moderately dilated.     Left Ventricle   The left ventricle is normal in size, wall motion and contractility.   Mild concentric left ventricular hypertrophy is present.   Estimated left ventricular ejection fraction is 60-65 %.     Right Atrium   Normal appearing right atrium.     Right Ventricle   Normal appearing right ventricle structure and function.     Pericardial Effusion   Trivial pericardial effusion is present.     Pleural Effusion   No evidence of pleural effusion.     Miscellaneous   All visualized extra cardiac structures appears to be normal.     Impression     Signature     ----------------------------------------------------------------   Electronically signed by Michele Zamora MD(Interpreting   physician) on 10/31/2018 12:26 PM   ----------------------------------------------------------------    < end of copied text >

## 2019-05-03 NOTE — PROGRESS NOTE ADULT - ASSESSMENT
85 y/o male with ESRD on HD (MWF), HTN, RA on MTX and steroids, R leg amputation, severe CDI and megacolon, Severe PAD s/p LLE endarterectomy 2 weeks ago with dr dugan presents with T 100 and DC from L groin.  Pt also c/o R arm pain/swelling where pt had fistulogram of the AC fistula on last admission.  RUL dopplers negative for DVT.  L groin sono--negative for pseudo aneurysm and + hematoma.  Pt given 2.1 L IVF, IV vanco/aztreonam in ED  On my exam in HD suite, awake, chronically ill appearing, NAD, daughter at bedside. Pt seen by Dr dugan in ED.  CXR--clear    4/20  s/p hd yesterday  prolonged bleed from access stopped w pressure  feels well  arm less tender  received 1 u prbc on hd yesterday  monitor cbc    4/22 SY  --ESRD : HD order and tx reviewed.   Tolerating tx well.  --AVF : as above.  Prolonged bleeding post tx.  Venous pressure acceptable today.  Monitor AVF function.  --Anemia : with acute loss.  Active infection leading to LETICIA hyporesponsiveness.  Transfuse 2 units today.  --PAD : post Left Ileofemoral Endarterectomy : Monitor Left foot perfusion.  --ID ; Left Groin infection : Continue abtx.  For debridement in am.    4/23  as above  For HD Wednesday  groin wash out today    4/24 SY  --ESRD : HD order and tx reviewed.  Tolerating tx well.  AVF cannulated without difficulty.  --Left Groin wound --Post  Muscle Flap Coverage.  Continue abtx.  --PAD : monitor perfusion.  --ID : continue Cefepime.    4/25 SY  --ESRD : Continue TIW HD  --AVF : cannulated without difficulty.  RUE edema much improved.  --Left Groin wound : post Muscle Flap Coverage.   Continue abtx.  --ID: continue abtx.  --PAD : post Left Ileofemoral endarterectomy : monitor perfusion.    4/26  seen on hd  in good spirits   stable on hd  fluid removal reduced due to low BP  hgb stable    4/27 SY  --ESRD : Continue TIW HD  --AVF : functioning well.  --PAD : Post Left Ileofemoral Endarterectomy : monitor perfusion  --Left Groin Wound : post Muscle Flap Coverage : continue abtx and wound care.    4/28 SY  --ESRD : for HD in am  --AVF : functioning well with improved cannulation.  --PAD : monitor perfusion ( Post Left Ileofemoral Endarterectomy)  --Left Groin wound : post Muscle Flap : continue abtx and wound care.    4/29 MK   - ESRD: tolerting hd, AVF cannultion well  - inc sputum production with possible endobronchial mass: dr bush input noted, may need bronch  - AMS with more confusion and jerking motion: pain meds have been limited, will get ammonia and abg value, has not been using bipap during hospitalization  - Osborne: fu h/h   - left groin wound s/p muscle flap coverage with s/p left ileofemoral endarterectomy      4/30 SY  --ESRD : Continue TIW HD  --Pulm : Hemoptysis resolved.  New RML PNA and FERNANDO nodule   improved resp status . Now PNA with mucus plugging and new findings of lesion.  For outpt work up once clinically improved.  --AMS ;resolved and at baseline.  Attempt to minimize pain meds.  --PAD : Monitor Left groin and LE wound.  --ID : continue Cefepime and Daptomycin with po vanco for C diff prophylaxis.    5/1 SY  --ESRD :HD order and tx reviewed.    --Pulm : New RML PNA and FERNANDO nodule.  Pulmonary follow up appreciated.  Continue ABTX.  --AMS : resolved and stable.  --Increasing Leukocytosis : continue abtx.  D/w Dr Flanagan.  --PAD : LLE pain improved. (post Left Ileofemoral endarterectomy last admission.    5/2  Some av access arm pain last hd improved w repositioning  stable vitals  pulmonary/ thoracic surgery input noted  d/w Pt daughter    5/3  arm feels better today  for dialysis later  sacral evaluation for possible intervention

## 2019-05-03 NOTE — PROGRESS NOTE ADULT - PROBLEM SELECTOR PLAN 2
Pt w/ R and L lung mass seen on CT. R endobronchial mass enlarging from previous CT and the L lung mass is knew. Likely cause of hemoptysis. Pt also with Thyroid mass increase suspicion for malignancy  -chest xray as above: PNA vs neoplasm vs artifact  -CT scan of chest as above  -Thyroid US results as above. Needs FNA. Can be outpatient  -pulmonary consult appreciated PET scan outpatient   -Thoracic surgery will do flexible bronchoscopy next week Monday Pt w/ R and L lung mass seen on CT. R endobronchial mass enlarging from previous CT and the L lung mass is knew. Likely cause of hemoptysis. Pt also with Thyroid mass increase suspicion for malignancy  -chest xray as above: PNA vs neoplasm vs artifact  -CT scan of chest as above  -Thyroid US results as above. Needs FNA. Can be outpatient  -pulmonary consult appreciated PET scan outpatient   -Thoracic surgery will do flexible bronchoscopy next week Monday  -will need HD, and stress dose steroids day of procedure +/- FFP if the INR is not <2 Pt w/ R and L lung mass seen on CT. R endobronchial mass enlarging from previous CT and the L lung mass is new. Likely cause of hemoptysis. Pt also with Thyroid mass increase suspicion for malignancy  -chest xray as above: PNA vs neoplasm vs artifact  -CT scan of chest as above  -Thyroid US results as above. Needs FNA. Can be outpatient  -pulmonary consult appreciated PET scan outpatient   -Thoracic surgery will do flexible bronchoscopy next week Monday  -will need HD, and stress dose steroids day of procedure +/- FFP if the INR is not <2

## 2019-05-03 NOTE — PROGRESS NOTE ADULT - PROBLEM SELECTOR PLAN 3
Pt w/ metabolic encephalopathy that has resolved. Likely 2/2 dilaudid   -ammonia levels wnl.   -no hypercapnia on ABGs  -Pt afebrile. Incision site looks c/d/i w/o drainage or pus making infectious less likely. Pt on appropriate abxs

## 2019-05-03 NOTE — PROGRESS NOTE ADULT - PROBLEM SELECTOR PLAN 7
Rate controlled  on coumadin. Held because Cardio and Dr Maxwell wants INR below 2 and then start heparin drip

## 2019-05-03 NOTE — PROGRESS NOTE ADULT - SUBJECTIVE AND OBJECTIVE BOX
CHIEF COMPLAINT:    SUBJECTIVE:   85 y/o male with ESRD on HD (MWF), HTN, RA on MTX and steroids, R leg amputation, severe CDI and megacolon, Severe PAD s/p LLE endarterectomy (4/1/19)with dr Clement presents with T 100.5 and DC from L groin.  Pt also c/o R arm pain/swelling where pt had fistulogram of the AC fistula on last admission.    5/3 Pt seen and examined at bedside. Pt was complaining of his back hurting from laying down but once he was sitting up in the chair the patient felt much better. Pt has no other complaints at this time and is aware that the next step in care will be the bronchoscopy.    REVIEW OF SYSTEMS:  CONSTITUTIONAL: No weakness, fevers or chills  EYES/ENT: No visual changes;  No vertigo or throat pain   NECK: No pain or stiffness  RESPIRATORY: No cough, wheezing, hemoptysis; No shortness of breath  CARDIOVASCULAR: No chest pain or palpitations  GASTROINTESTINAL: No abdominal or epigastric pain. No nausea, vomiting, or hematemesis; No diarrhea or constipation. No melena or hematochezia.  GENITOURINARY: No dysuria, frequency or hematuria  NEUROLOGICAL: No numbness or weakness  SKIN: No itching, burning, rashes, or lesions   All other review of systems is negative unless indicated above    Vital Signs Last 24 Hrs  T(C): 36.4 (03 May 2019 11:40), Max: 37.2 (02 May 2019 17:34)  T(F): 97.6 (03 May 2019 11:40), Max: 99 (02 May 2019 17:34)  HR: 74 (03 May 2019 11:40) (69 - 84)  BP: 136/43 (03 May 2019 11:40) (108/32 - 154/43)  BP(mean): --  RR: 17 (03 May 2019 11:40) (17 - 18)  SpO2: 100% (03 May 2019 11:40) (94% - 100%)    I&O's Summary      CAPILLARY BLOOD GLUCOSE          PHYSICAL EXAM:  Constitutional: NAD, awake and alert, well-developed  HEENT: PERR, EOMI, Normal Hearing, MMM  Neck: Soft and supple, No LAD, No JVD  Respiratory: good air movement, decreased BS on the right, No wheezing, rales or rhonchi  Cardiovascular: S1 and S2, regular rate and rhythm, no Murmurs, gallops or rubs  Gastrointestinal: Bowel Sounds present, soft, nontender, nondistended, no guarding, no rebound  Extremities: No peripheral edema. R BKA, Left surgical site has staples and is clean dry and intact. No drainage or erythema  Vascular: 2+ peripheral pulses  Neurological: A/O x 3, no focal deficits  Skin:  LLE incision site clean and dry, no erythema, no drainage     MEDICATIONS:  MEDICATIONS  (STANDING):  allopurinol 100 milliGRAM(s) Oral <User Schedule>  aspirin  chewable 81 milliGRAM(s) Oral daily  atorvastatin 20 milliGRAM(s) Oral at bedtime  cholestyramine Powder (Sugar-Free) 4 Gram(s) Oral daily  diltiazem    Tablet 60 milliGRAM(s) Oral four times a day  doxercalciferol Injectable 1 MICROGram(s) IV Push <User Schedule>  epoetin nelly Injectable 29784 Unit(s) IV Push <User Schedule>  finasteride 5 milliGRAM(s) Oral daily  lidocaine/prilocaine Cream 1 Application(s) Topical daily  pantoprazole    Tablet 40 milliGRAM(s) Oral before breakfast  predniSONE   Tablet 2.5 milliGRAM(s) Oral every other day  predniSONE   Tablet 5 milliGRAM(s) Oral every other day  sevelamer hydrochloride Oral Tab/Cap - Peds 800 milliGRAM(s) Oral three times a day with meals  silver sulfADIAZINE 1% Cream 1 Application(s) Topical daily  sodium chloride 0.9% lock flush 3 milliLiter(s) IV Push every 8 hours      LABS: All Labs Reviewed:                        10.4   14.54 )-----------( 403      ( 03 May 2019 06:59 )             32.7           PT/INR - ( 03 May 2019 06:59 )   PT: 38.8 sec;   INR: 3.37 ratio               Blood Culture:     RADIOLOGY/EKG:  < from: US Thyroid + Parathyroid (04.29.19 @ 17:06) >    IMPRESSION:     Dominant left thyroid nodule for which fine-needle aspiration could be   considered. Alternatively 6 month follow-up can be obtained.    < end of copied text >      < from: CT Chest No Cont (04.28.19 @ 12:17) >  IMPRESSION:   Continued increase size of tubular oblong opacity in the right middle   lobe, possibly endobronchial mass with impacted airways. Irregular   opacity in the posterior left upper lobe, measuring 1.4 cm. PET/CT is   recommended for further evaluation.    Left thyroid nodule, measuring 2.9 cm. Further evaluation with thyroid   ultrasound is recommended.    < end of copied text >      < from: Xray Chest 1 View- PORTABLE-Urgent (04.27.19 @ 15:51) >  Impression:    Right lower lobe opacity may represent pneumonia versus neoplasm versus   vascular artifact, recommend correlation with clinical symptoms and   short-term follow-up to resolution    < end of copied text >      < from: US Duplex Arterial Lower Ext Ltd, Left (04.19.19 @ 12:13) >  Impression:    Focal ultrasound of the left groin demonstrates no evidence of   pseudoaneurysm.    Hematoma anterior to the left common femoral artery measuring   approximately 3.2 x 0.9 x 5.0 cm.    There is plaque within the proximal left femoral artery, with an elevated   velocity of 110 cm/s.        < from: US Duplex Venous Upper Ext Ltd, Right (04.19.19 @ 12:16) >  IMPRESSION:     No evidence of right upper extremity deep venous thrombosis.  Markedly increased velocities within the patient's arteriovenous fistula   raising suspicious for hemodynamically significant stenosis.  DVT PPX:  Hold coumadin until INR <2 then start heparin gtt for procedure  ADVANCED DIRECTIVE:    DISPOSITION: CHIEF COMPLAINT:    SUBJECTIVE:   83 y/o male with ESRD on HD (MWF), HTN, RA on MTX and steroids, R leg amputation, severe CDI and megacolon, Severe PAD s/p LLE endarterectomy (4/1/19)with dr Clement presents with T 100.5 and DC from L groin.  Pt also c/o R arm pain/swelling where pt had fistulogram of the AC fistula on last admission.    5/3 Pt seen and examined at bedside. Pt was complaining of his back hurting from laying down but once he was sitting up in the chair the patient felt much better. Pt has no other complaints at this time and is aware that the next step in care will be the bronchoscopy. Spoke w/ Jacklyn the  who is aware that the patient will be staying through the weekend    REVIEW OF SYSTEMS:  CONSTITUTIONAL: No weakness, fevers or chills  EYES/ENT: No visual changes;  No vertigo or throat pain   NECK: No pain or stiffness  RESPIRATORY: No cough, wheezing, hemoptysis; No shortness of breath  CARDIOVASCULAR: No chest pain or palpitations  GASTROINTESTINAL: No abdominal or epigastric pain. No nausea, vomiting, or hematemesis; No diarrhea or constipation. No melena or hematochezia.  GENITOURINARY: No dysuria, frequency or hematuria  NEUROLOGICAL: No numbness or weakness  SKIN: No itching, burning, rashes, or lesions   All other review of systems is negative unless indicated above    Vital Signs Last 24 Hrs  T(C): 36.4 (03 May 2019 11:40), Max: 37.2 (02 May 2019 17:34)  T(F): 97.6 (03 May 2019 11:40), Max: 99 (02 May 2019 17:34)  HR: 74 (03 May 2019 11:40) (69 - 84)  BP: 136/43 (03 May 2019 11:40) (108/32 - 154/43)  BP(mean): --  RR: 17 (03 May 2019 11:40) (17 - 18)  SpO2: 100% (03 May 2019 11:40) (94% - 100%)    I&O's Summary      CAPILLARY BLOOD GLUCOSE          PHYSICAL EXAM:  Constitutional: NAD, awake and alert, well-developed  HEENT: PERR, EOMI, Normal Hearing, MMM  Neck: Soft and supple, No LAD, No JVD  Respiratory: good air movement, decreased BS on the right, No wheezing, rales or rhonchi  Cardiovascular: S1 and S2, regular rate and rhythm, no Murmurs, gallops or rubs  Gastrointestinal: Bowel Sounds present, soft, nontender, nondistended, no guarding, no rebound  Extremities: No peripheral edema. R BKA, Left surgical site has staples and is clean dry and intact. No drainage or erythema  Vascular: 2+ peripheral pulses  Neurological: A/O x 3, no focal deficits  Skin:  LLE incision site clean and dry, no erythema, no drainage     MEDICATIONS:  MEDICATIONS  (STANDING):  allopurinol 100 milliGRAM(s) Oral <User Schedule>  aspirin  chewable 81 milliGRAM(s) Oral daily  atorvastatin 20 milliGRAM(s) Oral at bedtime  cholestyramine Powder (Sugar-Free) 4 Gram(s) Oral daily  diltiazem    Tablet 60 milliGRAM(s) Oral four times a day  doxercalciferol Injectable 1 MICROGram(s) IV Push <User Schedule>  epoetin nelly Injectable 69151 Unit(s) IV Push <User Schedule>  finasteride 5 milliGRAM(s) Oral daily  lidocaine/prilocaine Cream 1 Application(s) Topical daily  pantoprazole    Tablet 40 milliGRAM(s) Oral before breakfast  predniSONE   Tablet 2.5 milliGRAM(s) Oral every other day  predniSONE   Tablet 5 milliGRAM(s) Oral every other day  sevelamer hydrochloride Oral Tab/Cap - Peds 800 milliGRAM(s) Oral three times a day with meals  silver sulfADIAZINE 1% Cream 1 Application(s) Topical daily  sodium chloride 0.9% lock flush 3 milliLiter(s) IV Push every 8 hours      LABS: All Labs Reviewed:                        10.4   14.54 )-----------( 403      ( 03 May 2019 06:59 )             32.7           PT/INR - ( 03 May 2019 06:59 )   PT: 38.8 sec;   INR: 3.37 ratio               Blood Culture:     RADIOLOGY/EKG:  < from: US Thyroid + Parathyroid (04.29.19 @ 17:06) >    IMPRESSION:     Dominant left thyroid nodule for which fine-needle aspiration could be   considered. Alternatively 6 month follow-up can be obtained.    < end of copied text >      < from: CT Chest No Cont (04.28.19 @ 12:17) >  IMPRESSION:   Continued increase size of tubular oblong opacity in the right middle   lobe, possibly endobronchial mass with impacted airways. Irregular   opacity in the posterior left upper lobe, measuring 1.4 cm. PET/CT is   recommended for further evaluation.    Left thyroid nodule, measuring 2.9 cm. Further evaluation with thyroid   ultrasound is recommended.    < end of copied text >      < from: Xray Chest 1 View- PORTABLE-Urgent (04.27.19 @ 15:51) >  Impression:    Right lower lobe opacity may represent pneumonia versus neoplasm versus   vascular artifact, recommend correlation with clinical symptoms and   short-term follow-up to resolution    < end of copied text >      < from: US Duplex Arterial Lower Ext Ltd, Left (04.19.19 @ 12:13) >  Impression:    Focal ultrasound of the left groin demonstrates no evidence of   pseudoaneurysm.    Hematoma anterior to the left common femoral artery measuring   approximately 3.2 x 0.9 x 5.0 cm.    There is plaque within the proximal left femoral artery, with an elevated   velocity of 110 cm/s.        < from: US Duplex Venous Upper Ext Ltd, Right (04.19.19 @ 12:16) >  IMPRESSION:     No evidence of right upper extremity deep venous thrombosis.  Markedly increased velocities within the patient's arteriovenous fistula   raising suspicious for hemodynamically significant stenosis.  DVT PPX:  Hold coumadin until INR <2 then start heparin gtt for procedure  ADVANCED DIRECTIVE:    DISPOSITION:

## 2019-05-03 NOTE — PROGRESS NOTE ADULT - SUBJECTIVE AND OBJECTIVE BOX
NEPHROLOGY INTERVAL HPI/OVERNIGHT EVENTS:    Date of Service: 05-01-19 @ 09:55    5/3  up in chair  has pain on buttocks will be evaluated by nursing for breakdown  for dialysis today     5/2  thoracic surgery/ pulmonary input noted   d/w Pts daughter  tolerating dialysis sessions    5/1 SY  Feeling well today.  No distress noted.    HPI:  83 y/o male with ESRD on HD (MWF), HTN, RA on MTX and steroids, R leg amputation, severe CDI and megacolon, Severe PAD s/p LLE endarterectomy 2 weeks ago with dr dugan presents with T 100 and DC from L groin.  Pt also c/o R arm pain/swelling where pt had fistulogram of the AC fistula on last admission.  RUL dopplers negative for DVT.  L groin sono--negative for pseudo aneurysm and + hematoma.  Pt given 2.1 L IVF, IV vanco/aztreonam in ED  On my exam in HD suite, awake, chronically ill appearing, NAD, daughter at bedside. Pt seen by Dr dugan in ED.  CXR--clear    TTE (10/18)--mild-mod MR/EF 60%    Pt d/w daughter regarding advance directives--Pt is FULL RESUSCITATION (19 Apr 2019 16:12)    MEDICATIONS  (STANDING):  allopurinol 100 milliGRAM(s) Oral <User Schedule>  aspirin  chewable 81 milliGRAM(s) Oral daily  atorvastatin 20 milliGRAM(s) Oral at bedtime  cholestyramine Powder (Sugar-Free) 4 Gram(s) Oral daily  diltiazem    Tablet 60 milliGRAM(s) Oral four times a day  doxercalciferol Injectable 1 MICROGram(s) IV Push <User Schedule>  epoetin nelly Injectable 19338 Unit(s) IV Push <User Schedule>  finasteride 5 milliGRAM(s) Oral daily  lidocaine/prilocaine Cream 1 Application(s) Topical daily  pantoprazole    Tablet 40 milliGRAM(s) Oral before breakfast  predniSONE   Tablet 2.5 milliGRAM(s) Oral every other day  predniSONE   Tablet 5 milliGRAM(s) Oral every other day  sevelamer hydrochloride Oral Tab/Cap - Peds 800 milliGRAM(s) Oral three times a day with meals  silver sulfADIAZINE 1% Cream 1 Application(s) Topical daily  sodium chloride 0.9% lock flush 3 milliLiter(s) IV Push every 8 hours    MEDICATIONS  (PRN):  acetaminophen   Tablet .. 650 milliGRAM(s) Oral every 6 hours PRN Temp greater or equal to 38.5C (101.3F)  acetaminophen   Tablet .. 650 milliGRAM(s) Oral every 6 hours PRN Mild Pain (1 - 3)  ALBUTerol    90 MICROgram(s) HFA Inhaler 2 Puff(s) Inhalation every 6 hours PRN Shortness of Breath and/or Wheezing  guaiFENesin   Syrup  (Sugar-Free) 100 milliGRAM(s) Oral every 6 hours PRN Cough  oxyCODONE    5 mG/acetaminophen 325 mG 1 Tablet(s) Oral every 4 hours PRN Moderate Pain (4 - 6)    Vital Signs Last 24 Hrs  T(C): 36 (03 May 2019 14:00), Max: 37.2 (02 May 2019 17:34)  T(F): 96.8 (03 May 2019 14:00), Max: 99 (02 May 2019 17:34)  HR: 84 (03 May 2019 14:48) (69 - 84)  BP: 104/38 (03 May 2019 14:48) (104/38 - 178/48)  BP(mean): --  RR: 16 (03 May 2019 14:48) (16 - 18)  SpO2: 100% (03 May 2019 11:40) (94% - 100%)      PHYSICAL EXAM:  Alert and comfortable  GENERAL: No distress  CHEST/LUNG: Fair air entry  HEART: S1S2 RRR  ABDOMEN: soft  EXTREMITIES: trace edema  SKIN:     05-03    136  |  104  |  31<H>  ----------------------------<  114<H>  3.9   |  25  |  3.77<H>    Ca    8.1<L>      03 May 2019 14:00  Phos  3.3     05-03    TPro  x   /  Alb  2.0<L>  /  TBili  x   /  DBili  x   /  AST  x   /  ALT  x   /  AlkPhos  x   05-03                          10.4   14.54 )-----------( 403      ( 03 May 2019 06:59 )             32.7

## 2019-05-03 NOTE — PROGRESS NOTE ADULT - ASSESSMENT
85 y/o male with ESRD on HD (MWF), HTN, RA on MTX and steroids, R leg amputation, severe CDI and megacolon, Severe PAD s/p LLE endarterectomy 2 weeks ago with dr dugan presents with T 100 and DC from L groin.  Pt also c/o R arm pain/swelling where pt had fistulogram of the AC fistula on last admission.  RUL dopplers negative for DVT.  L groin sono--negative for pseudo aneurysm and + hematoma.  Pt given 2.1 L IVF, IV vanco/aztreonam in ED  On my exam in HD suite, awake, chronically ill appearing, NAD, daughter at bedside. Pt seen by Dr dugan in ED.  CXR--clear    TTE (10/18)--mild-mod MR/EF 60%    Pt d/w daughter regarding advance directives--Pt is FULL RESUSCITATION (19 Apr 2019 16:12)  events noted while admitted muscle flap and plastic surgery care developed increased cough with possible bloody streaked sputuim / none present now and RN at bedside, both noticed thhick yellow sputum production  ct scan findings personally reveiwed / old films are not available on PACS at this time for comparison  Continued increase size of tubular oblong opacity in the right middle   lobe, possibly endobronchial mass with impacted airways. Irregular   opacity in the posterior left upper lobe, measuring 1.4 cm    Assessment / plan:  suspected pneumonia with mucus plugging RML  can not r/o endobronchial lesion  separate FERNANDO pulm nodule seen needs further eval  after antibiotic therapy  pt is not good candidate for bronch at this time  outpt PET / ct is reasonable only after course of antibiotics completed  ESRD on dilaysis now  hx Sepsis, due to unspecified organism: 2/2 poorly healing wounds b/l  Sleep apnea, obstructive: Requires home 02 therapy, and treatment with BIPAP  bibasilar atelectasis with associated small effusions      agree with iv cefepime / vanco  fu imaging  will need BRONCH once INR corrected  also PET as outpt is reasonable and all needs to be further discussed,   with pt and HCP depending on his overall wishes to proceed with workup while he is here  nebulizer  incentive spirometry  may consider repeat ct scan after antibiotic course that would be easier to arrange than outpt PET scan given overall condition  data discussed with pt's GIGIRAnamika  [phone], I've gain explained possibility of malignancy or endobronchial lesion.  I was on the phone with her for 10 mins yesterday / all questions are answered.    prior films reviewed with ct scan abd/pelvis done 7/2018 and 10/2018 with similar RLL findings NOW increased in size which raises suspicion for malignancy higher with this finding with possible endobronchial mass also present / all discussed with his DTR / may need BRONCH for diagnostic purposes / will have  eval with Dr Hall yesterday.  plan for BRONCH once INR corrected.

## 2019-05-03 NOTE — PROGRESS NOTE ADULT - SUBJECTIVE AND OBJECTIVE BOX
Subjective:  85 y/o male with ESRD on HD (MWF), CHF, A fib, CAD (stent), COPD (home O2), ex-smoker, SHAYY (BIPAP), HTN, HLD, RA (on MTX and steroids), gout, GERD, C diff, megacolon,  s/p R AKA, severe PAD s/p LLE endarterectomy admitted w left groin infection. On CT chest revealed RML opacity likely representing an endobronchial mass and a 1.4 cm FERNANDO opacity. Called for bronch.  5/3/19 Pt for bronchoscopy on Monday, 5/6/19. Coumadin held.      Vital Signs:  Vital Signs Last 24 Hrs  T(C): 36.4 (05-03-19 @ 11:40), Max: 37.2 (05-02-19 @ 17:34)  T(F): 97.6 (05-03-19 @ 11:40), Max: 99 (05-02-19 @ 17:34)  HR: 74 (05-03-19 @ 11:40) (69 - 84)  BP: 136/43 (05-03-19 @ 11:40) (108/32 - 154/43)  RR: 17 (05-03-19 @ 11:40) (17 - 18)  SpO2: 100% (05-03-19 @ 11:40) (94% - 100%) on (O2)    Telemetry/Alarms:    Relevant labs, radiology and Medications reviewed                        10.4   14.54 )-----------( 403      ( 03 May 2019 06:59 )             32.7           PT/INR - ( 03 May 2019 06:59 )   PT: 38.8 sec;   INR: 3.37 ratio           MEDICATIONS  (STANDING):  allopurinol 100 milliGRAM(s) Oral <User Schedule>  aspirin  chewable 81 milliGRAM(s) Oral daily  atorvastatin 20 milliGRAM(s) Oral at bedtime  cholestyramine Powder (Sugar-Free) 4 Gram(s) Oral daily  diltiazem    Tablet 60 milliGRAM(s) Oral four times a day  doxercalciferol Injectable 1 MICROGram(s) IV Push <User Schedule>  epoetin nelly Injectable 63946 Unit(s) IV Push <User Schedule>  finasteride 5 milliGRAM(s) Oral daily  lidocaine/prilocaine Cream 1 Application(s) Topical daily  pantoprazole    Tablet 40 milliGRAM(s) Oral before breakfast  predniSONE   Tablet 2.5 milliGRAM(s) Oral every other day  predniSONE   Tablet 5 milliGRAM(s) Oral every other day  sevelamer hydrochloride Oral Tab/Cap - Peds 800 milliGRAM(s) Oral three times a day with meals  silver sulfADIAZINE 1% Cream 1 Application(s) Topical daily  sodium chloride 0.9% lock flush 3 milliLiter(s) IV Push every 8 hours    MEDICATIONS  (PRN):  acetaminophen   Tablet .. 650 milliGRAM(s) Oral every 6 hours PRN Temp greater or equal to 38.5C (101.3F)  acetaminophen   Tablet .. 650 milliGRAM(s) Oral every 6 hours PRN Mild Pain (1 - 3)  ALBUTerol    90 MICROgram(s) HFA Inhaler 2 Puff(s) Inhalation every 6 hours PRN Shortness of Breath and/or Wheezing  guaiFENesin   Syrup  (Sugar-Free) 100 milliGRAM(s) Oral every 6 hours PRN Cough  oxyCODONE    5 mG/acetaminophen 325 mG 1 Tablet(s) Oral every 4 hours PRN Moderate Pain (4 - 6)      Physical exam  Gen NAD  Neuro Alert  Card RRR  Pulm clear  Abd soft  Ext Rt aka      I&O's Summary      Assessment  84y Male  w/ PAST MEDICAL & SURGICAL HISTORY:  Above knee amputation of right lower extremity  Recurrent Clostridium difficile diarrhea  Diastolic CHF  Peripheral vascular disease  Afib  Anemia  CKD (chronic kidney disease)  COPD (chronic obstructive pulmonary disease)  SHAYY (obstructive sleep apnea)  Sepsis, due to unspecified organism: 2/2 poorly healing wounds b/l  Dyspepsia: On moderate exertion.  Sleep apnea, obstructive: Requires home 02 therapy, and treatment with BIPAP  Atelectasis  Pleural effusion, bilateral  Respiratory failure  Peripheral edema  CRI (chronic renal insufficiency)  Gout  Benign prostatic hypertrophy  Spinal stenosis  Hypercholesterolemia  GERD (gastroesophageal reflux disease)  CAD (coronary artery disease)  Hypertension  S/P angioplasty with stent  Cataract of left eye  Prostate: Surgery green light procedure.  S/P rotator cuff surgery: Right  S/P angioplasty  admitted with complaints of Patient is a 84y old  Male who presents with a chief complaint of L groin DC (03 May 2019 13:17)      PLAN  Plan for bronch on Monday, 5/6/19  hold coumadin    Discussed with Cardiothoracic Team at AM rounds.

## 2019-05-03 NOTE — PROGRESS NOTE ADULT - PROBLEM SELECTOR PLAN 1
Pt who had an endarterectomy April 1st who developed an infection at entry site and fever of 100.5  -now afebrile  -dopplers of the LLE showed hematoma anterior to the left common femoral artery measuring approximately 3.2 x 0.9 x 5.0 cm  -Dr. Clement recommendation appreciated:Debridement done.   Pain control w/ tylenol, tramadol if needed and then possibly percocet. Hold dilaudid can make drowsy. Only when absolutely necessary.  -wound cx growing E faecium. Vanc resistant   s/p aztreonam 4 days, s/p daptomycin 8days, s/p vancomycin, s/p cefepime 10 days  - continue wound care

## 2019-05-03 NOTE — PROGRESS NOTE ADULT - ASSESSMENT
1. Preoperative cardiac evaluation- Pt denies cardiac symptoms currently.  Pt has an extensive history as above.   Patient is a moderate risk patient for a moderate risk procedure with poor functional tolerance.   Patient is optimized to proceed with anticipated procedure from a cardiac standpoint.   He is not currently on betablockers and he is on cardizem.  Close monitoring of the volume status periprocedurally.    2. Atrial fibrillation with high CHADVasc score is high.  Will start heparin drip once INR <2.  Discussed with Dr Rea and he expressed the need for holding since pt is going to have biopsy.    3. CAD s/p PCI- continue current meds.    4. DVT proph, replete lytes as needed.

## 2019-05-03 NOTE — CHART NOTE - NSCHARTNOTEFT_GEN_A_CORE
Pt seen and examined with house staff.  Plan formulated and reviewed on rounds    Briefly, 85 y/o male with AFib on coumadin, ESRD on HD (MWF), HTN, RA on MTX and steroids, R leg amputation, severe CDI and megacolon, Severe PAD s/p LLE endarterectomy 2 weeks PTA with dr dugan admitted with post op infection/sepsis at surgical site--s/p flap on 4/23.    Pt noted to have possible RML elongated endobronchial lesion and FERNANDO lesion on CT ordered for cough and bloody sputum.    ROS o/w negative     Stable vitals No fevers  Awake and alert  Sitting in chair  Daughter at bedside  L ankle ulcer wo discharge    For possible FOB and Bx next week--Cont to HOLD coumadin--If INR drifts below 2, then start UFH gtt.  May have to correct INR prior to Bx  Will need stress dose steroids on day of surgery--HC 100mg IV X 3 doses  Local wound care

## 2019-05-04 LAB
ANION GAP SERPL CALC-SCNC: 7 MMOL/L — SIGNIFICANT CHANGE UP (ref 5–17)
APTT BLD: 45.4 SEC — HIGH (ref 27.5–36.3)
BUN SERPL-MCNC: 17 MG/DL — SIGNIFICANT CHANGE UP (ref 7–23)
CALCIUM SERPL-MCNC: 8.1 MG/DL — LOW (ref 8.5–10.1)
CHLORIDE SERPL-SCNC: 103 MMOL/L — SIGNIFICANT CHANGE UP (ref 96–108)
CO2 SERPL-SCNC: 27 MMOL/L — SIGNIFICANT CHANGE UP (ref 22–31)
CREAT SERPL-MCNC: 2.54 MG/DL — HIGH (ref 0.5–1.3)
GLUCOSE BLDC GLUCOMTR-MCNC: 145 MG/DL — HIGH (ref 70–99)
GLUCOSE BLDC GLUCOMTR-MCNC: 81 MG/DL — SIGNIFICANT CHANGE UP (ref 70–99)
GLUCOSE SERPL-MCNC: 84 MG/DL — SIGNIFICANT CHANGE UP (ref 70–99)
INR BLD: 3.59 RATIO — HIGH (ref 0.88–1.16)
POTASSIUM SERPL-MCNC: 3.8 MMOL/L — SIGNIFICANT CHANGE UP (ref 3.5–5.3)
POTASSIUM SERPL-SCNC: 3.8 MMOL/L — SIGNIFICANT CHANGE UP (ref 3.5–5.3)
PROTHROM AB SERPL-ACNC: 41.4 SEC — HIGH (ref 10–12.9)
SODIUM SERPL-SCNC: 137 MMOL/L — SIGNIFICANT CHANGE UP (ref 135–145)

## 2019-05-04 PROCEDURE — 99233 SBSQ HOSP IP/OBS HIGH 50: CPT

## 2019-05-04 RX ORDER — GABAPENTIN 400 MG/1
300 CAPSULE ORAL DAILY
Qty: 0 | Refills: 0 | Status: DISCONTINUED | OUTPATIENT
Start: 2019-05-04 | End: 2019-05-06

## 2019-05-04 RX ADMIN — SEVELAMER CARBONATE 800 MILLIGRAM(S): 2400 POWDER, FOR SUSPENSION ORAL at 11:39

## 2019-05-04 RX ADMIN — SEVELAMER CARBONATE 800 MILLIGRAM(S): 2400 POWDER, FOR SUSPENSION ORAL at 17:56

## 2019-05-04 RX ADMIN — CHOLESTYRAMINE 4 GRAM(S): 4 POWDER, FOR SUSPENSION ORAL at 11:39

## 2019-05-04 RX ADMIN — OXYCODONE AND ACETAMINOPHEN 1 TABLET(S): 5; 325 TABLET ORAL at 08:39

## 2019-05-04 RX ADMIN — Medication 60 MILLIGRAM(S): at 17:56

## 2019-05-04 RX ADMIN — OXYCODONE AND ACETAMINOPHEN 1 TABLET(S): 5; 325 TABLET ORAL at 01:54

## 2019-05-04 RX ADMIN — Medication 60 MILLIGRAM(S): at 23:09

## 2019-05-04 RX ADMIN — OXYCODONE AND ACETAMINOPHEN 1 TABLET(S): 5; 325 TABLET ORAL at 10:19

## 2019-05-04 RX ADMIN — GABAPENTIN 300 MILLIGRAM(S): 400 CAPSULE ORAL at 11:39

## 2019-05-04 RX ADMIN — SODIUM CHLORIDE 3 MILLILITER(S): 9 INJECTION INTRAMUSCULAR; INTRAVENOUS; SUBCUTANEOUS at 22:53

## 2019-05-04 RX ADMIN — Medication 5 MILLIGRAM(S): at 11:39

## 2019-05-04 RX ADMIN — Medication 100 MILLIGRAM(S): at 11:38

## 2019-05-04 RX ADMIN — ATORVASTATIN CALCIUM 20 MILLIGRAM(S): 80 TABLET, FILM COATED ORAL at 21:29

## 2019-05-04 RX ADMIN — Medication 60 MILLIGRAM(S): at 11:39

## 2019-05-04 RX ADMIN — SEVELAMER CARBONATE 800 MILLIGRAM(S): 2400 POWDER, FOR SUSPENSION ORAL at 08:38

## 2019-05-04 RX ADMIN — PANTOPRAZOLE SODIUM 40 MILLIGRAM(S): 20 TABLET, DELAYED RELEASE ORAL at 05:48

## 2019-05-04 RX ADMIN — SODIUM CHLORIDE 3 MILLILITER(S): 9 INJECTION INTRAMUSCULAR; INTRAVENOUS; SUBCUTANEOUS at 05:45

## 2019-05-04 RX ADMIN — FINASTERIDE 5 MILLIGRAM(S): 5 TABLET, FILM COATED ORAL at 11:39

## 2019-05-04 RX ADMIN — SODIUM CHLORIDE 3 MILLILITER(S): 9 INJECTION INTRAMUSCULAR; INTRAVENOUS; SUBCUTANEOUS at 13:47

## 2019-05-04 RX ADMIN — Medication 60 MILLIGRAM(S): at 05:48

## 2019-05-04 NOTE — PROGRESS NOTE ADULT - ASSESSMENT
83 y/o male with ESRD on HD (MWF), HTN, RA on MTX and steroids, R leg amputation, severe CDI and megacolon, Severe PAD s/p LLE endarterectomy 2 weeks ago with dr dugan presents with T 100 and DC from L groin.  Pt also c/o R arm pain/swelling where pt had fistulogram of the AC fistula on last admission.  RUL dopplers negative for DVT.  L groin sono--negative for pseudo aneurysm and + hematoma.  Pt given 2.1 L IVF, IV vanco/aztreonam in ED  On my exam in HD suite, awake, chronically ill appearing, NAD, daughter at bedside. Pt seen by Dr dugan in ED.  CXR--clear    TTE (10/18)--mild-mod MR/EF 60%    Pt d/w daughter regarding advance directives--Pt is FULL RESUSCITATION (19 Apr 2019 16:12)  events noted while admitted muscle flap and plastic surgery care developed increased cough with possible bloody streaked sputuim / none present now and RN at bedside, both noticed thhick yellow sputum production  ct scan findings personally reveiwed / old films are not available on PACS at this time for comparison  Continued increase size of tubular oblong opacity in the right middle   lobe, possibly endobronchial mass with impacted airways. Irregular   opacity in the posterior left upper lobe, measuring 1.4 cm    Assessment / plan:  suspected pneumonia with mucus plugging RML  can not r/o endobronchial lesion  separate FERNANDO pulm nodule seen needs further eval  after antibiotic therapy  pt is not good candidate for bronch at this time  outpt PET / ct is reasonable only after course of antibiotics completed  ESRD on dilaysis now  hx Sepsis, due to unspecified organism: 2/2 poorly healing wounds b/l  Sleep apnea, obstructive: Requires home 02 therapy, and treatment with BIPAP  bibasilar atelectasis with associated small effusions      agree with iv cefepime / vanco  fu imaging  will need BRONCH once INR corrected  also PET as outpt is reasonable and all needs to be further discussed,   with pt and HCP depending on his overall wishes to proceed with workup while he is here  nebulizer  incentive spirometry  may consider repeat ct scan after antibiotic course that would be easier to arrange than outpt PET scan given overall condition  data discussed with pt's GIGIRAnamika  [phone], I've gain explained possibility of malignancy or endobronchial lesion.  I was on the phone with her for 10 mins yesterday / all questions are answered.    prior films reviewed with ct scan abd/pelvis done 7/2018 and 10/2018 with similar RLL findings NOW increased in size which raises suspicion for malignancy higher with this finding with possible endobronchial mass also present / all discussed with his DTR / may need BRONCH for diagnostic purposes / will have  eval with Dr Hall yesterday.  plan for BRONCH once INR corrected.

## 2019-05-04 NOTE — PROGRESS NOTE ADULT - SUBJECTIVE AND OBJECTIVE BOX
Patient is a 84y Male who reports no complaints overnight.     REVIEW OF SYSTEMS:    CONSTITUTIONAL: stable weakness, no fevers or chills  RESPIRATORY: No cough, wheezing, hemoptysis; No shortness of breath  CARDIOVASCULAR: No chest pain or palpitations  GENITOURINARY: No dysuria, frequency or hematuria  All other review of systems is negative unless indicated above.    MEDICATIONS  (STANDING):  allopurinol 100 milliGRAM(s) Oral <User Schedule>  atorvastatin 20 milliGRAM(s) Oral at bedtime  cholestyramine Powder (Sugar-Free) 4 Gram(s) Oral daily  diltiazem    Tablet 60 milliGRAM(s) Oral four times a day  doxercalciferol Injectable 1 MICROGram(s) IV Push <User Schedule>  epoetin nelly Injectable 09656 Unit(s) IV Push <User Schedule>  finasteride 5 milliGRAM(s) Oral daily  gabapentin 300 milliGRAM(s) Oral daily  lidocaine/prilocaine Cream 1 Application(s) Topical daily  pantoprazole    Tablet 40 milliGRAM(s) Oral before breakfast  predniSONE   Tablet 2.5 milliGRAM(s) Oral every other day  predniSONE   Tablet 5 milliGRAM(s) Oral every other day  sevelamer hydrochloride Oral Tab/Cap - Peds 800 milliGRAM(s) Oral three times a day with meals  silver sulfADIAZINE 1% Cream 1 Application(s) Topical daily  sodium chloride 0.9% lock flush 3 milliLiter(s) IV Push every 8 hours    MEDICATIONS  (PRN):  acetaminophen   Tablet .. 650 milliGRAM(s) Oral every 6 hours PRN Temp greater or equal to 38.5C (101.3F)  acetaminophen   Tablet .. 650 milliGRAM(s) Oral every 6 hours PRN Mild Pain (1 - 3)  ALBUTerol    90 MICROgram(s) HFA Inhaler 2 Puff(s) Inhalation every 6 hours PRN Shortness of Breath and/or Wheezing  guaiFENesin   Syrup  (Sugar-Free) 100 milliGRAM(s) Oral every 6 hours PRN Cough  oxyCODONE    5 mG/acetaminophen 325 mG 1 Tablet(s) Oral every 4 hours PRN Moderate Pain (4 - 6)        T(C): , Max: 36.8 (05-04-19 @ 05:17)  T(F): , Max: 98.2 (05-04-19 @ 05:17)  HR: 71 (05-04-19 @ 11:39)  BP: 125/46 (05-04-19 @ 11:39)  BP(mean): --  RR: 17 (05-04-19 @ 11:39)  SpO2: 99% (05-04-19 @ 11:39)  Wt(kg): --        PHYSICAL EXAM:    Constitutional: NAD, frail, cachectic  HEENT: PERRLA, EOMI,  MMM  Respiratory: dist BS  Cardiovascular: S1 and S2  Gastrointestinal: BS+, soft, NT/ND  Extremities: + edema  Neurological: A/O x 3, no focal deficits  : No Feldman  Skin: No rashes  Access: + avf        LABS:                        10.4   14.54 )-----------( 403      ( 03 May 2019 06:59 )             32.7     04 May 2019 07:51    137    |  103    |  17     ----------------------------<  84     3.8     |  27     |  2.54   03 May 2019 14:00    136    |  104    |  31     ----------------------------<  114    3.9     |  25     |  3.77   01 May 2019 06:35    140    |  108    |  28     ----------------------------<  87     4.2     |  23     |  3.72     Ca    8.1        04 May 2019 07:51  Ca    8.1        03 May 2019 14:00  Ca    8.2        01 May 2019 06:35  Phos  3.3       03 May 2019 14:00    TPro  x      /  Alb  2.0    /  TBili  x      /  DBili  x      /  AST  x      /  ALT  x      /  AlkPhos  x      03 May 2019 14:00          Urine Studies:          RADIOLOGY & ADDITIONAL STUDIES:

## 2019-05-04 NOTE — PROGRESS NOTE ADULT - ASSESSMENT
83 y/o male with ESRD on HD (MWF), HTN, RA on MTX and steroids, R leg amputation, severe CDI and megacolon, Severe PAD s/p LLE endarterectomy 2 weeks ago with dr dugan presents with fever.    ESRD  -HD monday, plan early shift with procedure later in day  -If FFP required, order per medical team for AM HD monday  -K protocol, UF as tolerated  -Greg primary unit    ID  -Infectius disease follow up  -Abx renally dosed    Anemia  -LETICIA protocol    D/c with RN  d/c with daughter  d/c with medical resident

## 2019-05-04 NOTE — PROGRESS NOTE ADULT - SUBJECTIVE AND OBJECTIVE BOX
Subjective:  Patient seen and examined with Dr. Pierre.  Family at bedside.  States no hemoptysis in 4 days.  INR remains elevated at 3.6.    Vital Signs:  Vital Signs Last 24 Hrs  T(F): 98.1, Max: 98.2   HR: 71   BP: 125/46   RR: 17   SpO2: 99% on room air      Relevant labs, radiology and Medications reviewed                        10.4   14.54 )-----------( 403      ( 03 May 2019 06:59 )             32.7     05-04    137  |  103  |  17  ----------------------------<  84  3.8   |  27  |  2.54<H>    Ca    8.1<L>      04 May 2019 07:51  Phos  3.3     05-03    TPro  x   /  Alb  2.0<L>  /  TBili  x   /  DBili  x   /  AST  x   /  ALT  x   /  AlkPhos  x   05-03    PT/INR - ( 04 May 2019 07:51 )   PT: 41.4 sec;   INR: 3.59 ratio         PTT - ( 04 May 2019 07:51 )  PTT:45.4 sec  MEDICATIONS  (STANDING):  allopurinol 100 milliGRAM(s) Oral <User Schedule>  atorvastatin 20 milliGRAM(s) Oral at bedtime  cholestyramine Powder (Sugar-Free) 4 Gram(s) Oral daily  diltiazem    Tablet 60 milliGRAM(s) Oral four times a day  doxercalciferol Injectable 1 MICROGram(s) IV Push <User Schedule>  epoetin nelly Injectable 93905 Unit(s) IV Push <User Schedule>  finasteride 5 milliGRAM(s) Oral daily  gabapentin 300 milliGRAM(s) Oral daily  lidocaine/prilocaine Cream 1 Application(s) Topical daily  pantoprazole    Tablet 40 milliGRAM(s) Oral before breakfast  predniSONE   Tablet 2.5 milliGRAM(s) Oral every other day  predniSONE   Tablet 5 milliGRAM(s) Oral every other day  sevelamer hydrochloride Oral Tab/Cap - Peds 800 milliGRAM(s) Oral three times a day with meals  silver sulfADIAZINE 1% Cream 1 Application(s) Topical daily  sodium chloride 0.9% lock flush 3 milliLiter(s) IV Push every 8 hours    MEDICATIONS  (PRN):  acetaminophen   Tablet .. 650 milliGRAM(s) Oral every 6 hours PRN Temp greater or equal to 38.5C (101.3F)  acetaminophen   Tablet .. 650 milliGRAM(s) Oral every 6 hours PRN Mild Pain (1 - 3)  ALBUTerol    90 MICROgram(s) HFA Inhaler 2 Puff(s) Inhalation every 6 hours PRN Shortness of Breath and/or Wheezing  guaiFENesin   Syrup  (Sugar-Free) 100 milliGRAM(s) Oral every 6 hours PRN Cough  oxyCODONE    5 mG/acetaminophen 325 mG 1 Tablet(s) Oral every 4 hours PRN Moderate Pain (4 - 6)      Physical exam  Gen: NAD breathing comfortably.  Neuro: AO x 3.  Card: S1 S2.  Pulm: CTA bilaterally.  Abd: Soft NT ND.  Ext: Left leg surgical site dressing c/d/i.      Assessment  84y Male  w/ PAST MEDICAL & SURGICAL HISTORY:  Above knee amputation of right lower extremity  Recurrent Clostridium difficile diarrhea  Diastolic CHF  Peripheral vascular disease  Afib  Anemia  CKD (chronic kidney disease)  COPD (chronic obstructive pulmonary disease)  SHAYY (obstructive sleep apnea)  Sepsis, due to unspecified organism: 2/2 poorly healing wounds b/l  Dyspepsia: On moderate exertion.  Sleep apnea, obstructive: Requires home 02 therapy, and treatment with BIPAP  Atelectasis  Pleural effusion, bilateral  Respiratory failure  Peripheral edema  CRI (chronic renal insufficiency)  Gout  Benign prostatic hypertrophy  Spinal stenosis  Hypercholesterolemia  GERD (gastroesophageal reflux disease)  CAD (coronary artery disease)  Hypertension  S/P angioplasty with stent  Cataract of left eye  Prostate: Surgery green light procedure.  S/P rotator cuff surgery: Right  S/P angioplasty  admitted with complaints of Left groin wound infection (04 May 2019 11:53)  .  Patient underwent Debridement of open wound, 20 sq cm or less  . Postoperative course/issues:    * Episodes of mild hemoptysis, found to have endobronchial lesion suspicious for cancer    PLAN  Hold further anticoagulation for atrial fibrillation.  Plan to bring patient to OR for diagnostic bronchoscopy.  HD before procedure.  If INR remains elevated on Monday - will reschedule the bronchoscopy pending INR for Tuesday or Wednesday.    Discussed with Cardiothoracic Team at AM rounds.

## 2019-05-04 NOTE — CHART NOTE - NSCHARTNOTEFT_GEN_A_CORE
Pt seen and examined with house staff.  Plan formulated and reviewed on rounds    Briefly, 83 y/o male with AFib on coumadin, ESRD on HD (MWF), HTN, RA on MTX and steroids, R leg amputation, severe CDI and megacolon, Severe PAD s/p LLE endarterectomy 2 weeks PTA with dr duagn admitted with post op infection/sepsis at surgical site--s/p flap on 4/23.    Pt noted to have possible RML elongated endobronchial lesion and FERNANDO lesion on CT ordered for cough and bloody sputum.    Pt c/o neuropathy pain--ROS o/w negative     Stable vitals No fevers  Awake and alert  Daughter at bedside  L ankle ulcer wo discharge    INR remains elevated    For possible FOB and Bx next week--Cont to HOLD coumadin--If INR drifts below 2, then start UFH gtt.    May have to correct INR prior to Bx--to d/w nephrology for early HD on Monday morning along with FFP infusion  Will need stress dose steroids on day of surgery--HC 100mg IV X 3 doses  Local wound care  Will restart neurontin today    Above d/w pt and daughter-- All concerns addressed

## 2019-05-04 NOTE — PROGRESS NOTE ADULT - PROBLEM SELECTOR PLAN 1
Pt who had an endarterectomy April 1st who developed an infection at entry site and fever of 100.5  -now afebrile  -dopplers of the LLE showed hematoma anterior to the left common femoral artery measuring approximately 3.2 x 0.9 x 5.0 cm  -Dr. Clement recommendation appreciated:Debridement done.   Pain control w/ tylenol, tramadol if needed and then possibly percocet. Hold dilaudid can make drowsy. Only when absolutely necessary.  -wound cx growing E faecium. Vanc resistant   s/p aztreonam 4 days, s/p daptomycin 8days, s/p vancomycin, s/p cefepime 10 days  - continue wound care Pt who had an endarterectomy April 1st who developed an infection at entry site and fever of 100.5  -now afebrile   -dopplers of the LLE showed hematoma anterior to the left common femoral artery measuring approximately 3.2 x 0.9 x 5.0 cm  -Dr. Clement recommendation appreciated: Debridement done.   Pain control w/ tylenol, tramadol if needed and then possibly percocet.   Hold dilaudid can make drowsy. Only when absolutely necessary.  - renally dosed gabapentin for leg pain  -wound cx growing E faecium. Vanc resistant   s/p aztreonam 4 days, s/p daptomycin 8days, s/p vancomycin, s/p cefepime 10 days  - f/u wound care consult

## 2019-05-04 NOTE — PROGRESS NOTE ADULT - SUBJECTIVE AND OBJECTIVE BOX
Patient is a 84y old  Male who presents with a chief complaint of L groin DC (28 Apr 2019 17:14)      HPI:  83 y/o male with ESRD on HD (MWF), HTN, RA on MTX and steroids, R leg amputation, severe CDI and megacolon, Severe PAD s/p LLE endarterectomy 2 weeks ago with dr dugan presents with T 100 and DC from L groin.  Pt also c/o R arm pain/swelling where pt had fistulogram of the AC fistula on last admission.  RUL dopplers negative for DVT.  L groin sono--negative for pseudo aneurysm and + hematoma.  Pt given 2.1 L IVF, IV vanco/aztreonam in ED  On my exam in HD suite, awake, chronically ill appearing, NAD, daughter at bedside. Pt seen by Dr dugan in ED.  CXR--clear    TTE (10/18)--mild-mod MR/EF 60%    Pt d/w daughter regarding advance directives--Pt is FULL RESUSCITATION (19 Apr 2019 16:12)  events noted while admitted muscle flap and plastic surgery care developed increased cough with possible bloody streaked sputuim / none present now and RN at bedside, both noticed thhick yellow sputum production    4/30  data discussed with RN at bedside  no cp  improved breathing    5/1  feels the same  decreased cough  DTR is contacted and data discussed regarding abnormal ct scan findings  5/2  data discussed with pt's DTR and medical team as well  although BRONCH is appropriate given ct scan findings, it can not be done yet till INR corrected specially in pt with ESRD.  DATA DISCUSSED WITH DR JORGE MASSEY AS WELL for thoracic surgery consult  5/3  data discussed with thoracic surgery yesterday  discussed with dr Rashid today  pt is resting comfortably  no resp distress  no hemoptysis    5/4  No acute events occurred overnight  Discussed with hospitalist/resident team and daughter     MEDICATIONS  (STANDING):  allopurinol 100 milliGRAM(s) Oral <User Schedule>  artificial tears (preservative free) Ophthalmic Solution 1 Drop(s) Both EYES three times a day  atorvastatin 20 milliGRAM(s) Oral at bedtime  cholestyramine Powder (Sugar-Free) 4 Gram(s) Oral daily  diltiazem    Tablet 60 milliGRAM(s) Oral four times a day  doxercalciferol Injectable 1 MICROGram(s) IV Push <User Schedule>  epoetin nelly Injectable 96503 Unit(s) IV Push <User Schedule>  finasteride 5 milliGRAM(s) Oral daily  gabapentin 300 milliGRAM(s) Oral daily  lidocaine/prilocaine Cream 1 Application(s) Topical daily  pantoprazole    Tablet 40 milliGRAM(s) Oral before breakfast  predniSONE   Tablet 2.5 milliGRAM(s) Oral every other day  predniSONE   Tablet 5 milliGRAM(s) Oral every other day  sevelamer hydrochloride Oral Tab/Cap - Peds 800 milliGRAM(s) Oral three times a day with meals  silver sulfADIAZINE 1% Cream 1 Application(s) Topical daily  sodium chloride 0.9% lock flush 3 milliLiter(s) IV Push every 8 hours    MEDICATIONS  (PRN):  acetaminophen   Tablet .. 650 milliGRAM(s) Oral every 6 hours PRN Temp greater or equal to 38.5C (101.3F)  acetaminophen   Tablet .. 650 milliGRAM(s) Oral every 6 hours PRN Mild Pain (1 - 3)  ALBUTerol    90 MICROgram(s) HFA Inhaler 2 Puff(s) Inhalation every 6 hours PRN Shortness of Breath and/or Wheezing  guaiFENesin   Syrup  (Sugar-Free) 100 milliGRAM(s) Oral every 6 hours PRN Cough  oxyCODONE    5 mG/acetaminophen 325 mG 1 Tablet(s) Oral every 4 hours PRN Moderate Pain (4 - 6)      PHYSICAL EXAM  General Appearance: cooperative, no acute distress,   HEENT: PERRL, conjunctiva clear, EOM's intact, non injected pharynx, no exudate, TM   normal  Neck: Supple, , no adenopathy, thyroid: not enlarged, no carotid bruit or JVD  Back: Symmetric, no  tenderness,no soft tissue tenderness  Lungs: Clear to auscultation bilateral,no adventitious breath sounds, normal   expiratory phase  Heart: Regular rate and rhythm, S1, S2 normal, no murmur, rub or gallop  Abdomen: Soft, non-tender, bowel sounds active , no hepatosplenomegaly  Extremities: no cyanosis or edema, no joint swelling, hx AKA right side  Skin: Skin color, texture normal, no rashes   Neurologic: Alert and oriented X3 , cranial nerves intact, sensory and motor normal,    ECG:    LABS:                        9.8    16.81 )-----------( 443      ( 01 May 2019 06:35 )             32.1                           9.9    13.16 )-----------( 358      ( 30 Apr 2019 06:08 )             32.1                           10.1   7.96  )-----------( 334      ( 29 Apr 2019 07:08 )             33.2     04-29 04-30    142  |  111<H>  |  19  ----------------------------<  87  3.8   |  24  |  2.70<H>    Ca    8.3<L>      30 Apr 2019 06:08  Phos  3.4     04-29    TPro  x   /  Alb  1.8<L>  /  TBili  x   /  DBili  x   /  AST  x   /  ALT  x   /  AlkPhos  x   04-29  138  |  108  |  35<H>  ----------------------------<  95  4.4   |  22  |  4.61<H>    Ca    8.0<L>      29 Apr 2019 07:08              PT/INR - ( 29 Apr 2019 07:08 )   PT: 39.9 sec;   INR: 3.46 ratio                   RADIOLOGY & ADDITIONAL STUDIES:  < from: CT Chest No Cont (04.28.19 @ 12:17) >  PROCEDURE:   CT of the Chest was performed without intravenous contrast.  Sagittal and coronal reformats were performed.    FINDINGS:    LUNGS AND LARGE AIRWAYS: Emphysema. Continued increase size of tubular   oblong opacity in the right middle lobe, possibly endobronchial mass with   impacted airways. Possible associated broncholiths. Irregular opacity in   the posterior left upper lobe (series 3, image 60), measuring 1.4 cm.  PLEURA: Small left pleural effusion. Trace right pleural effusion.  VESSELS: Atherosclerotic calcifications.   HEART: Heart size is normal. No pericardial effusion.  MEDIASTINUM AND [place]: No lymphadenopathy.  CHEST WALL AND LOWER NECK: Left thyroid nodule, measuring 2.9 cm.   Extension of the thyroid gland into the superior mediastinum.   VISUALIZED UPPER ABDOMEN: Cholelithiasis. Unchanged probable cyst in   hepatic segment 8.  BONES: Degenerative changes of the spine.    IMPRESSION:   Continued increase size of tubular oblong opacity in the right middle   lobe, possibly endobronchial mass with impacted airways. Irregular   opacity in the posterior left upper lobe, measuring 1.4 cm. PET/CT is   recommended for further evaluation.    Left thyroid nodule, measuring 2.9 cm. Further evaluation with thyroid   ultrasound is recommended.    < end of copied text >

## 2019-05-04 NOTE — PROGRESS NOTE ADULT - PROBLEM SELECTOR PLAN 7
Rate controlled  on coumadin. Held because Cardio and Dr Maxwell wants INR below 2 and then start heparin drip Rate controlled  on coumadin.  Held Coumadin because Cardio and Dr Maxwell wants INR below 2 and then start heparin drip

## 2019-05-04 NOTE — PROGRESS NOTE ADULT - PROBLEM SELECTOR PLAN 3
Pt w/ metabolic encephalopathy that has resolved. Likely 2/2 dilaudid   -ammonia levels wnl.   -no hypercapnia on ABGs  -Pt afebrile. Incision site looks c/d/i w/o drainage or pus making infectious less likely. Pt on appropriate abxs Pt w/ metabolic encephalopathy that has resolved. Likely 2/2 dilaudid   - resolved  -ammonia levels wnl.   -no hypercapnia on ABGs  -Pt afebrile. Incision site looks c/d/i w/o drainage or pus making infectious less likely. Pt on appropriate abxs

## 2019-05-04 NOTE — PROGRESS NOTE ADULT - SUBJECTIVE AND OBJECTIVE BOX
83 y/o male with ESRD on HD (MWF), HTN, RA on MTX and steroids, R leg amputation, severe CDI and megacolon, Severe PAD s/p LLE endarterectomy (4/1/19)with dr Clement presents with T 100.5 and DC from L groin.  Pt also c/o R arm pain/swelling where pt had fistulogram of the AC fistula on last admission.    5/4/19:  Pt seen and examined at bedside. Pt was complaining of intermittent leg pain while laying in bed. Pt daughter Dominga is at bedside and reports he has this "spasm" like pain that comes and goes, which he takes gabapentin at home.  Pt has no other complaints at this time and is aware that the next step in care will be the bronchoscopy scheduled with cardiothoracic surgery on monday. Discussion held with daughter and patient about benefits and risks of procedure and that pt will also need FFP due to elevated coumadin. Consent was signed and is in chart. Contacted Dr. Dragan Gallegos and informed about early scheduled placement due to this HD is on MWF and will need approx 4 units of FFP Monday Morning    Spoke w/ Jacklyn the  who is aware that the patient will be staying through the weekend      REVIEW OF SYSTEMS:  CONSTITUTIONAL: No weakness, fevers or chills  EYES/ENT: No visual changes;  No vertigo or throat pain   NECK: No pain or stiffness  RESPIRATORY: No cough, wheezing, hemoptysis; No shortness of breath  CARDIOVASCULAR: No chest pain or palpitations  GASTROINTESTINAL: No abdominal or epigastric pain. No nausea, vomiting, or hematemesis; No diarrhea or constipation. No melena or hematochezia.  GENITOURINARY: No dysuria, frequency or hematuria  NEUROLOGICAL: No numbness or weakness  SKIN: No itching, burning, rashes, or lesions   All other review of systems is negative unless indicated above    Vital Signs Last 24 Hrs  T(C): 36.4 (03 May 2019 11:40), Max: 37.2 (02 May 2019 17:34)  T(F): 97.6 (03 May 2019 11:40), Max: 99 (02 May 2019 17:34)  HR: 74 (03 May 2019 11:40) (69 - 84)  BP: 136/43 (03 May 2019 11:40) (108/32 - 154/43)  BP(mean): --  RR: 17 (03 May 2019 11:40) (17 - 18)  SpO2: 100% (03 May 2019 11:40) (94% - 100%)    I&O's Summary      CAPILLARY BLOOD GLUCOSE          PHYSICAL EXAM:  Constitutional: NAD, awake and alert, well-developed  HEENT: PERR, EOMI, Normal Hearing, MMM  Neck: Soft and supple, No LAD, No JVD  Respiratory: good air movement, decreased BS on the right, No wheezing, rales or rhonchi  Cardiovascular: S1 and S2, regular rate and rhythm, no Murmurs, gallops or rubs  Gastrointestinal: Bowel Sounds present, soft, nontender, nondistended, no guarding, no rebound  Extremities: No peripheral edema. R BKA, Left surgical site has staples and is clean dry and intact. No drainage or erythema  Vascular: 2+ peripheral pulses  Neurological: A/O x 3, no focal deficits  Skin:  LLE incision site clean and dry, no erythema, no drainage     MEDICATIONS:  MEDICATIONS  (STANDING):  allopurinol 100 milliGRAM(s) Oral <User Schedule>  aspirin  chewable 81 milliGRAM(s) Oral daily  atorvastatin 20 milliGRAM(s) Oral at bedtime  cholestyramine Powder (Sugar-Free) 4 Gram(s) Oral daily  diltiazem    Tablet 60 milliGRAM(s) Oral four times a day  doxercalciferol Injectable 1 MICROGram(s) IV Push <User Schedule>  epoetin nelly Injectable 25227 Unit(s) IV Push <User Schedule>  finasteride 5 milliGRAM(s) Oral daily  lidocaine/prilocaine Cream 1 Application(s) Topical daily  pantoprazole    Tablet 40 milliGRAM(s) Oral before breakfast  predniSONE   Tablet 2.5 milliGRAM(s) Oral every other day  predniSONE   Tablet 5 milliGRAM(s) Oral every other day  sevelamer hydrochloride Oral Tab/Cap - Peds 800 milliGRAM(s) Oral three times a day with meals  silver sulfADIAZINE 1% Cream 1 Application(s) Topical daily  sodium chloride 0.9% lock flush 3 milliLiter(s) IV Push every 8 hours      LABS: All Labs Reviewed:                        10.4   14.54 )-----------( 403      ( 03 May 2019 06:59 )             32.7           PT/INR - ( 03 May 2019 06:59 )   PT: 38.8 sec;   INR: 3.37 ratio               Blood Culture:     RADIOLOGY/EKG:  < from: US Thyroid + Parathyroid (04.29.19 @ 17:06) >    IMPRESSION:     Dominant left thyroid nodule for which fine-needle aspiration could be   considered. Alternatively 6 month follow-up can be obtained.    < end of copied text >      < from: CT Chest No Cont (04.28.19 @ 12:17) >  IMPRESSION:   Continued increase size of tubular oblong opacity in the right middle   lobe, possibly endobronchial mass with impacted airways. Irregular   opacity in the posterior left upper lobe, measuring 1.4 cm. PET/CT is   recommended for further evaluation.    Left thyroid nodule, measuring 2.9 cm. Further evaluation with thyroid   ultrasound is recommended.    < end of copied text >      < from: Xray Chest 1 View- PORTABLE-Urgent (04.27.19 @ 15:51) >  Impression:    Right lower lobe opacity may represent pneumonia versus neoplasm versus   vascular artifact, recommend correlation with clinical symptoms and   short-term follow-up to resolution    < end of copied text >      < from: US Duplex Arterial Lower Ext Ltd, Left (04.19.19 @ 12:13) >  Impression:    Focal ultrasound of the left groin demonstrates no evidence of   pseudoaneurysm.    Hematoma anterior to the left common femoral artery measuring   approximately 3.2 x 0.9 x 5.0 cm.    There is plaque within the proximal left femoral artery, with an elevated   velocity of 110 cm/s.        < from: US Duplex Venous Upper Ext Ltd, Right (04.19.19 @ 12:16) >  IMPRESSION:     No evidence of right upper extremity deep venous thrombosis.  Markedly increased velocities within the patient's arteriovenous fistula   raising suspicious for hemodynamically significant stenosis.  DVT PPX:  Hold coumadin until INR <2 then start heparin gtt for procedure  ADVANCED DIRECTIVE:    DISPOSITION: 83 y/o male with ESRD on HD (MWF), HTN, RA on MTX and steroids, R leg amputation, severe CDI and megacolon, Severe PAD s/p LLE endarterectomy (4/1/19)with dr Clement presents with T 100.5 and DC from L groin.  Pt also c/o R arm pain/swelling where pt had fistulogram of the AC fistula on last admission.    5/4/19:  Pt seen and examined at bedside. Pt was complaining of intermittent leg pain while laying in bed. Pt daughter Dominga is at bedside and reports he has this "spasm" like pain that comes and goes, which he takes gabapentin at home.  Pt has no other complaints at this time and is aware that the next step in care will be the bronchoscopy scheduled with cardiothoracic surgery on monday. Discussion held with daughter and patient about benefits and risks of procedure and that pt will also need FFP due to elevated coumadin. Consent was signed and is in chart. Contacted Dr. Dragan Gallegos and informed about early scheduled placement due to this HD is on MWF and will need approx 4 units of FFP Monday Morning. Medicine Team spoke with Jackyln newman  who is aware that the patient will be staying through the weekend.      REVIEW OF SYSTEMS:  CONSTITUTIONAL: No weakness, fevers or chills  EYES/ENT: No visual changes;  No vertigo or throat pain   NECK: No pain or stiffness  RESPIRATORY: No cough, wheezing, hemoptysis; No shortness of breath  CARDIOVASCULAR: No chest pain or palpitations  GASTROINTESTINAL: No abdominal or epigastric pain. No nausea, vomiting, or hematemesis; No diarrhea or constipation. No melena or hematochezia.  GENITOURINARY: No dysuria, frequency or hematuria  NEUROLOGICAL: No numbness or weakness  MUSCULOSKELETAL: + intermittent leg pain   SKIN: No itching, burning, rashes, or lesions   All other review of systems is negative unless indicated above    ICU Vital Signs Last 24 Hrs  T(C): 36.7 (04 May 2019 11:39), Max: 36.8 (04 May 2019 05:17)  T(F): 98.1 (04 May 2019 11:39), Max: 98.2 (04 May 2019 05:17)  HR: 71 (04 May 2019 11:39) (68 - 92)  BP: 125/46 (04 May 2019 11:39) (104/38 - 178/48)  RR: 17 (04 May 2019 11:39) (14 - 18)  SpO2: 99% (04 May 2019 11:39) (97% - 100%)      PHYSICAL EXAM:  Constitutional: NAD, awake and alert, well-developed  HEENT: PERR, EOMI, Normal Hearing, MMM  Neck: Soft and supple, No LAD, No JVD  Respiratory: good air movement, decreased BS on the right, No wheezing, rales or rhonchi  Cardiovascular: S1 and S2, regular rate and rhythm, no Murmurs, gallops or rubs  Gastrointestinal: Bowel Sounds present, soft, nontender, nondistended, no guarding, no rebound  Extremities: No peripheral edema. R BKA, Left surgical site has staples and is clean dry and intact. No drainage or erythema.  left lower extremity: no erythema, no tenderness on palpation, no swelling.  Vascular: 2+ peripheral pulses  Neurological: A/O x 3, no focal deficits  Skin:  LLE incision site clean and dry, no erythema, no drainage     MEDICATIONS  (STANDING):  allopurinol 100 milliGRAM(s) Oral <User Schedule>  atorvastatin 20 milliGRAM(s) Oral at bedtime  cholestyramine Powder (Sugar-Free) 4 Gram(s) Oral daily  diltiazem    Tablet 60 milliGRAM(s) Oral four times a day  doxercalciferol Injectable 1 MICROGram(s) IV Push <User Schedule>  epoetin nelly Injectable 12604 Unit(s) IV Push <User Schedule>  finasteride 5 milliGRAM(s) Oral daily  gabapentin 300 milliGRAM(s) Oral daily  lidocaine/prilocaine Cream 1 Application(s) Topical daily  pantoprazole    Tablet 40 milliGRAM(s) Oral before breakfast  predniSONE   Tablet 2.5 milliGRAM(s) Oral every other day  predniSONE   Tablet 5 milliGRAM(s) Oral every other day  sevelamer hydrochloride Oral Tab/Cap - Peds 800 milliGRAM(s) Oral three times a day with meals  silver sulfADIAZINE 1% Cream 1 Application(s) Topical daily  sodium chloride 0.9% lock flush 3 milliLiter(s) IV Push every 8 hours    MEDICATIONS  (PRN):  acetaminophen   Tablet .. 650 milliGRAM(s) Oral every 6 hours PRN Temp greater or equal to 38.5C (101.3F)  acetaminophen   Tablet .. 650 milliGRAM(s) Oral every 6 hours PRN Mild Pain (1 - 3)  ALBUTerol    90 MICROgram(s) HFA Inhaler 2 Puff(s) Inhalation every 6 hours PRN Shortness of Breath and/or Wheezing  guaiFENesin   Syrup  (Sugar-Free) 100 milliGRAM(s) Oral every 6 hours PRN Cough  oxyCODONE    5 mG/acetaminophen 325 mG 1 Tablet(s) Oral every 4 hours PRN Moderate Pain (4 - 6)        Labs                        10.4   14.54 )-----------( 403      ( 03 May 2019 06:59 )             32.7     04 May 2019 07:51    137    |  103    |  17     ----------------------------<  84     3.8     |  27     |  2.54     Ca    8.1        04 May 2019 07:51  Phos  3.3       03 May 2019 14:00    TPro  x      /  Alb  2.0    /  TBili  x      /  DBili  x      /  AST  x      /  ALT  x      /  AlkPhos  x      03 May 2019 14:00    PT/INR - ( 04 May 2019 07:51 )   PT: 41.4 sec;   INR: 3.59 ratio         PTT - ( 04 May 2019 07:51 )  PTT:45.4 sec  CAPILLARY BLOOD GLUCOSE      POCT Blood Glucose.: 81 mg/dL (04 May 2019 07:52)        Blood Culture:     RADIOLOGY/EKG:  < from: US Thyroid + Parathyroid (04.29.19 @ 17:06) >    IMPRESSION:     Dominant left thyroid nodule for which fine-needle aspiration could be   considered. Alternatively 6 month follow-up can be obtained.    < end of copied text >      < from: CT Chest No Cont (04.28.19 @ 12:17) >  IMPRESSION:   Continued increase size of tubular oblong opacity in the right middle   lobe, possibly endobronchial mass with impacted airways. Irregular   opacity in the posterior left upper lobe, measuring 1.4 cm. PET/CT is   recommended for further evaluation.    Left thyroid nodule, measuring 2.9 cm. Further evaluation with thyroid   ultrasound is recommended.    < end of copied text >      < from: Xray Chest 1 View- PORTABLE-Urgent (04.27.19 @ 15:51) >  Impression:    Right lower lobe opacity may represent pneumonia versus neoplasm versus   vascular artifact, recommend correlation with clinical symptoms and   short-term follow-up to resolution    < end of copied text >      < from: US Duplex Arterial Lower Ext Ltd, Left (04.19.19 @ 12:13) >  Impression:    Focal ultrasound of the left groin demonstrates no evidence of   pseudoaneurysm.    Hematoma anterior to the left common femoral artery measuring   approximately 3.2 x 0.9 x 5.0 cm.    There is plaque within the proximal left femoral artery, with an elevated   velocity of 110 cm/s.        < from: US Duplex Venous Upper Ext Ltd, Right (04.19.19 @ 12:16) >  IMPRESSION:     No evidence of right upper extremity deep venous thrombosis.  Markedly increased velocities within the patient's arteriovenous fistula   raising suspicious for hemodynamically significant stenosis.  DVT PPX:  Hold coumadin until INR <2 then start heparin gtt for procedure  ADVANCED DIRECTIVE:    DISPOSITION:

## 2019-05-04 NOTE — PROGRESS NOTE ADULT - SUBJECTIVE AND OBJECTIVE BOX
pt well known to me    admitted for post dialysis fever and tachycardia.  CT of abdomen demonstrated possible eliane colonic inflammatory changes.  He does not note abdominal pain.    Boot on L leg    will re evaluate patient tomorrow when he is in bed    MEDICATIONS  (STANDING):  allopurinol 100 milliGRAM(s) Oral daily  aspirin  chewable 81 milliGRAM(s) Oral daily  buDESOnide   0.5 milliGRAM(s) Respule 0.5 milliGRAM(s) Inhalation two times a day  cefepime  Injectable. 1000 milliGRAM(s) IV Push daily  diltiazem    Tablet 30 milliGRAM(s) Oral every 6 hours  doxazosin 8 milliGRAM(s) Oral at bedtime  finasteride 5 milliGRAM(s) Oral daily  heparin  Injectable 5000 Unit(s) SubCutaneous every 8 hours  simvastatin 10 milliGRAM(s) Oral at bedtime  vancomycin    Solution 125 milliGRAM(s) Oral every 6 hours    MEDICATIONS  (PRN):  acetaminophen   Tablet .. 650 milliGRAM(s) Oral every 6 hours PRN Temp greater or equal to 38C (100.4F), Mild Pain (1 - 3)  ondansetron Injectable 4 milliGRAM(s) IV Push every 6 hours PRN Nausea      Allergies    Zosyn (Rash)    Intolerances        Flatus: [ ] YES [ ] NO             Bowel Movement: [ ] YES [ ] NO  Pain (0-10):            Pain Control Adequate: [ ] YES [ ] NO  Nausea: [ ] YES [ ] NO            Vomiting: [ ] YES [ ] NO  Diarrhea: [ ] YES [ ] NO         Constipation: [ ] YES [ ] NO     Chest Pain: [ ] YES [ ] NO    SOB:  [ ] YES [ ] NO    Vital Signs Last 24 Hrs  T(C): 37.8 (06 Oct 2018 12:34), Max: 37.8 (06 Oct 2018 12:34)  T(F): 100.1 (06 Oct 2018 12:34), Max: 100.1 (06 Oct 2018 12:34)  HR: 100 (06 Oct 2018 12:34) (100 - 100)  BP: 141/51 (06 Oct 2018 12:34) (141/51 - 141/51)  BP(mean): --  RR: 20 (06 Oct 2018 12:34) (20 - 20)  SpO2: 95% (06 Oct 2018 12:34) (95% - 95%)    I&O's Summary      Physical Exam:  General: NAD, resting comfortably  Pulmonary: normal resp effort, CTA-B  Cardiovascular: NSR  Abdominal: soft, NT/ND  Extremities: WWP, normal strength  Neuro: A/O x 3, CNs II-XII grossly intact, normal motor/sensation, no focal deficits  Pulses:   Right:                                                                          Left:  FEM [ ]2+ [ ]1+ [ ]doppler                                             FEM [ ]2+ [ ]1+ [ ]doppler    POP [ ]2+ [ ]1+ [ ]doppler                                             POP [ ]2+ [ ]1+ [ ]doppler    DP [ ]2+ [ ]1+ [ ]doppler                                                DP [ ]2+ [ ]1+ [ ]doppler  PT[ ]2+ [ ]1+ [ ]doppler                                                  PT [ ]2+ [ ]1+ [ ]doppler    LABS:                        9.6    4.22  )-----------( 142      ( 06 Oct 2018 11:36 )             31.0     10-    144  |  110<H>  |  39<H>  ----------------------------<  83  3.5   |  22  |  2.41<H>    Ca    7.9<L>      06 Oct 2018 11:36  Phos  4.5     10-  Mg     1.7     10-    TPro  5.8<L>  /  Alb  2.4<L>  /  TBili  0.4  /  DBili  x   /  AST  24  /  ALT  31  /  AlkPhos  63  10-06    PT/INR - ( 05 Oct 2018 20:40 )   PT: 12.3 sec;   INR: 1.14 ratio         PTT - ( 05 Oct 2018 20:40 )  PTT:59.5 sec  Urinalysis Basic - ( 06 Oct 2018 01:57 )    Color: Yellow / Appearance: very cloudy / S.015 / pH: x  Gluc: x / Ketone: Trace  / Bili: Negative / Urobili: Negative mg/dL   Blood: x / Protein: 500 mg/dL / Nitrite: Negative   Leuk Esterase: Moderate / RBC: 0-2 /HPF / WBC TNTC /HPF   Sq Epi: x / Non Sq Epi: Negative / Bacteria: Few      LIVER FUNCTIONS - ( 06 Oct 2018 11:36 )  Alb: 2.4 g/dL / Pro: 5.8 gm/dL / ALK PHOS: 63 U/L / ALT: 31 U/L / AST: 24 U/L / GGT: x           CAPILLARY BLOOD GLUCOSE          RADIOLOGY & ADDITIONAL TESTS: left

## 2019-05-04 NOTE — PROGRESS NOTE ADULT - SUBJECTIVE AND OBJECTIVE BOX
Patient is a 84y old  Male who presents with a chief complaint of L groin DC.    HPI:  83 y/o male with ESRD on HD (MWF), HTN, RA on MTX and steroids, R leg amputation, severe CDI and megacolon, Severe PAD s/p LLE endarterectomy  presents with T 100 and DC from L groin initally.  Pt is planned to have an endobronchial lesion biopsy of the right lung.  He denies any CP or pressure or SOB.    5/4- this am he c/o R buttock pain . No CP or SOB.     PAST MEDICAL & SURGICAL HISTORY:  Above knee amputation of right lower extremity  Recurrent Clostridium difficile diarrhea  Diastolic CHF  Peripheral vascular disease  Afib  Anemia  CKD (chronic kidney disease)  COPD (chronic obstructive pulmonary disease)  SHAYY (obstructive sleep apnea)  Sepsis, due to unspecified organism: 2/2 poorly healing wounds b/l  Dyspepsia: On moderate exertion.  Sleep apnea, obstructive: Requires home 02 therapy, and treatment with BIPAP  Atelectasis  Pleural effusion, bilateral  Respiratory failure  Peripheral edema  CRI (chronic renal insufficiency)  Gout  Benign prostatic hypertrophy  Spinal stenosis  Hypercholesterolemia  GERD (gastroesophageal reflux disease)  CAD (coronary artery disease)  Hypertension  S/P angioplasty with stent  Cataract of left eye  Prostate: Surgery green light procedure.  S/P rotator cuff surgery: Right  S/P angioplasty      MEDICATIONS  (STANDING):  allopurinol 100 milliGRAM(s) Oral <User Schedule>  aspirin  chewable 81 milliGRAM(s) Oral daily  atorvastatin 20 milliGRAM(s) Oral at bedtime  cholestyramine Powder (Sugar-Free) 4 Gram(s) Oral daily  diltiazem    Tablet 60 milliGRAM(s) Oral four times a day  doxercalciferol Injectable 1 MICROGram(s) IV Push <User Schedule>  epoetin nelly Injectable 00126 Unit(s) IV Push <User Schedule>  finasteride 5 milliGRAM(s) Oral daily  lidocaine/prilocaine Cream 1 Application(s) Topical daily  pantoprazole    Tablet 40 milliGRAM(s) Oral before breakfast  predniSONE   Tablet 2.5 milliGRAM(s) Oral every other day  predniSONE   Tablet 5 milliGRAM(s) Oral every other day  sevelamer hydrochloride Oral Tab/Cap - Peds 800 milliGRAM(s) Oral three times a day with meals  silver sulfADIAZINE 1% Cream 1 Application(s) Topical daily  sodium chloride 0.9% lock flush 3 milliLiter(s) IV Push every 8 hours  warfarin 2 milliGRAM(s) Oral once    MEDICATIONS  (PRN):  acetaminophen   Tablet .. 650 milliGRAM(s) Oral every 6 hours PRN Temp greater or equal to 38.5C (101.3F)  acetaminophen   Tablet .. 650 milliGRAM(s) Oral every 6 hours PRN Mild Pain (1 - 3)  ALBUTerol    90 MICROgram(s) HFA Inhaler 2 Puff(s) Inhalation every 6 hours PRN Shortness of Breath and/or Wheezing  guaiFENesin   Syrup  (Sugar-Free) 100 milliGRAM(s) Oral every 6 hours PRN Cough  oxyCODONE    5 mG/acetaminophen 325 mG 1 Tablet(s) Oral every 4 hours PRN Moderate Pain (4 - 6)      FAMILY HISTORY:  Family history of colorectal cancer (Father)  Family history of diabetes mellitus (Mother, Sibling)  Family history of hypertension (Mother)      SOCIAL HISTORY:  no recent smoking     ICU Vital Signs Last 24 Hrs  T(C): 36.8 (04 May 2019 05:17), Max: 36.8 (04 May 2019 05:17)  T(F): 98.2 (04 May 2019 05:17), Max: 98.2 (04 May 2019 05:17)  HR: 73 (04 May 2019 05:17) (68 - 92)  BP: 143/48 (04 May 2019 05:17) (104/38 - 178/48)  BP(mean): --  ABP: --  ABP(mean): --  RR: 18 (04 May 2019 05:17) (14 - 18)  SpO2: 100% (04 May 2019 05:17) (97% - 100%)    REVIEW OF SYSTEMS:  CONSTITUTIONAL:  c/o fatigue  HEENT:  Eyes:  No visual changes.     ENT:  No epistaxis.  No sinus pain.    RESPIRATORY:  c/o hemoptysis.  CARDIOVASCULAR:  No chest pains.  No palpitations. No shortness of breath, No orthopnea or PND.  GASTROINTESTINAL:  No abdominal pain.  No nausea or vomiting.    GENITOURINARY:    No hematuria.    MUSCULOSKELETAL:  No musculoskeletal pain.  No joint swelling.  No arthritis.  NEUROLOGICAL:  No tingling or numbness or weakness.  PSYCHIATRIC:  No confusion  SKIN:  brusing , c/o R buttock pain         PHYSICAL EXAM-    Constitutional: no acute distress    Head: Head is normocephalic and atraumatic.      Neck:  No JVD.     Cardiovascular: Regular rate and rhythm without S3, S4. No murmurs or rubs are appreciated.      Respiratory: Breath sounds are normal. No rales. No wheezing.    Abdomen: Soft, nontender, nondistended with positive bowel sounds.      Extremity: No tenderness. No  pitting edema     Neurologic: The patient is alert and oriented.      Skin: No rash, no obvious lesions noted.      Psychiatric: The patient appears to be emotionally stable.      INTERPRETATION OF TELEMETRY: not on     ECG: Sinus rythm, normal axis, no ST T changes     I&O's Detail    01 May 2019 07:01  -  02 May 2019 07:00  --------------------------------------------------------  IN:    Oral Fluid: 100 mL  Total IN: 100 mL    OUT:  Total OUT: 0 mL    Total NET: 100 mL                              10.4   14.54 )-----------( 403      ( 03 May 2019 06:59 )             32.7     05-04    137  |  103  |  17  ----------------------------<  84  3.8   |  27  |  2.54<H>    Ca    8.1<L>      04 May 2019 07:51  Phos  3.3     05-03    TPro  x   /  Alb  2.0<L>  /  TBili  x   /  DBili  x   /  AST  x   /  ALT  x   /  AlkPhos  x   05-03        LIVER FUNCTIONS - ( 03 May 2019 14:00 )  Alb: 2.0 g/dL / Pro: x     / ALK PHOS: x     / ALT: x     / AST: x     / GGT: x           PT/INR - ( 04 May 2019 07:51 )   PT: 41.4 sec;   INR: 3.59 ratio         PTT - ( 04 May 2019 07:51 )  PTT:45.4 sec               EXAM:  ECHO TTE WO CON COMP W DOP         PROCEDURE DATE:  10/31/2018        INTERPRETATION:  Transthoracic Echocardiography Report (TTE)     Demographics     Patient name        TERRI NEWTON      Age           83 year(s)     Med Rec #           [id number]         Gender        Male     Account #           [id number]           Date of Birth 12/21/1934     Interpreting        Michele Zamora,   Room Number   0033   Physician           MD Michele Zamora MD     Referring Physician SUK-HYEON YUN MD  Sonographer   Opal Otto                                                       [facility]     Date of study       10/31/2018 09:55                       AM     Height              69 in             Weight     143.3 pounds    Type of Study:     TTE procedure: ECHO TTE WO CON COMP W DOP     BP: 133/40 mmHg     Study Location: [facility] Quality: Fair    Indications   1) I31.3 - Pericardial effusion noninflammatory    M-Mode Measurements (cm)     LVEDd: 5.57 cm            LVESd: 3.34 cm   IVSEd: 1.3 cm   LVPWd: 1.2 cm             AO Root Dimension: 3.5 cm                             ACS: 2.2 cm    Doppler Measurements:      MV Peak E-Wave: 98.2 cm/s    MV Peak A-Wave: 115 cm/s    MV E/A Ratio: 0.85 %    MV Peak Gradient: 3.86 mmHg     Findings     Mitral Valve   Normal appearing mitral valve structure and function.   Mild to Moderate mitral regurgitation is present.   Mild mitral annular calcification is present.     Aortic Valve   Mild aorticsclerosis is present with normal valvular opening.     Tricuspid Valve   Normal appearing tricuspid valve structure and function.   Trace tricuspid valve regurgitation is present.     Pulmonic Valve   Normal appearing pulmonic valve structure and function.   Mild pulmonic valvular regurgitation (1+) is present.     Left Atrium   The left atrium is moderately dilated.     Left Ventricle   The left ventricle is normal in size, wall motion and contractility.   Mild concentric left ventricular hypertrophy is present.   Estimated left ventricular ejection fraction is 60-65 %.     Right Atrium   Normal appearing right atrium.     Right Ventricle   Normal appearing right ventricle structure and function.     Pericardial Effusion   Trivial pericardial effusion is present.     Pleural Effusion   No evidence of pleural effusion.     Miscellaneous   All visualized extra cardiac structures appears to be normal.     Impression     Signature     ----------------------------------------------------------------   Electronically signed by Michele Zamora MD(Interpreting   physician) on 10/31/2018 12:26 PM   ----------------------------------------------------------------    Valves    < end of copied text >           I&O's Summary    01 May 2019 07:01  -  02 May 2019 07:00  --------------------------------------------------------  IN: 100 mL / OUT: 0 mL / NET: 100 mL      BNP  RADIOLOGY & ADDITIONAL STUDIES:  < from: Transthoracic Echocardiogram (10.31.18 @ 10:22) >     EXAM:  ECHO TTE WO CON COMP W DOP         PROCEDURE DATE:  10/31/2018        INTERPRETATION:  Transthoracic Echocardiography Report (TTE)     Demographics     Patient name        TERRI NEWTON      Age           83 year(s)     Med Rec #           [id number]         Gender        Male     Account #           [id number]           Date of Birth 12/21/1934     Interpreting        Michele Zamora,   Room Number   0033   Physician           MD Michele Zamora MD     Referring Physician SUK-HYEON YUN MD  Sonographer   Opal Otto,                                                       [facility]     Date of study       10/31/2018 09:55                       AM     Height              69 in             Weight     143.3 pounds    Type of Study:     TTE procedure: ECHO TTE WO CON COMP W DOP     BP: 133/40 mmHg     Study Location: [facility] Quality: Fair    Indications   1) I31.3 - Pericardial effusion noninflammatory    M-Mode Measurements (cm)     LVEDd: 5.57 cm            LVESd: 3.34 cm   IVSEd: 1.3 cm   LVPWd: 1.2 cm             AO Root Dimension: 3.5 cm                             ACS: 2.2 cm    Doppler Measurements:      MV Peak E-Wave: 98.2 cm/s    MV Peak A-Wave: 115 cm/s    MV E/A Ratio: 0.85 %    MV Peak Gradient: 3.86 mmHg     Findings     Mitral Valve   Normal appearing mitral valve structure and function.   Mild to Moderate mitral regurgitation is present.   Mild mitral annular calcification is present.     Aortic Valve   Mild aorticsclerosis is present with normal valvular opening.     Tricuspid Valve   Normal appearing tricuspid valve structure and function.   Trace tricuspid valve regurgitation is present.     Pulmonic Valve   Normal appearing pulmonic valve structure and function.   Mild pulmonic valvular regurgitation (1+) is present.     Left Atrium   The left atrium is moderately dilated.     Left Ventricle   The left ventricle is normal in size, wall motion and contractility.   Mild concentric left ventricular hypertrophy is present.   Estimated left ventricular ejection fraction is 60-65 %.     Right Atrium   Normal appearing right atrium.     Right Ventricle   Normal appearing right ventricle structure and function.     Pericardial Effusion   Trivial pericardial effusion is present.     Pleural Effusion   No evidence of pleural effusion.     Miscellaneous   All visualized extra cardiac structures appears to be normal.     Impression     Signature     ----------------------------------------------------------------   Electronically signed by Michele Zamora MD(Interpreting   physician) on 10/31/2018 12:26 PM   ----------------------------------------------------------------    < end of copied text >

## 2019-05-04 NOTE — PROGRESS NOTE ADULT - ASSESSMENT
Preoperative cardiac evaluation- Pt denies cardiac symptoms currently.  he has extensive history   Patient is a moderate risk patient for a moderate risk procedure with poor functional tolerance.  Patient can proceed with anticipated procedure without any further cardiac testing.  Patient is optimized from cardiac standpoint.  He is not currently on betablockers and he is on cardizem.  CLose monitoring of the volume status periprocedurally.    Atrial fibrillation with high CHADVasc score is high.  INR still noted to be high today.  Bronchoscopy planned for MOnday er CTS team.   Will start heparin drip once INR <2.  Discussed with Dr Rea and he expressed the need for holding since pt is going to have biopsy.    CAD s/p PCI- continue current meds.  no anginal symptoms    Other medical issues- Management per primary team.   Thank you for allowing me to participate in the care of this patient. Please feel free to contact me with any questions.

## 2019-05-05 DIAGNOSIS — D64.9 ANEMIA, UNSPECIFIED: ICD-10-CM

## 2019-05-05 LAB
ANION GAP SERPL CALC-SCNC: 7 MMOL/L — SIGNIFICANT CHANGE UP (ref 5–17)
BUN SERPL-MCNC: 30 MG/DL — HIGH (ref 7–23)
CALCIUM SERPL-MCNC: 8.2 MG/DL — LOW (ref 8.5–10.1)
CHLORIDE SERPL-SCNC: 104 MMOL/L — SIGNIFICANT CHANGE UP (ref 96–108)
CO2 SERPL-SCNC: 26 MMOL/L — SIGNIFICANT CHANGE UP (ref 22–31)
CREAT SERPL-MCNC: 3.54 MG/DL — HIGH (ref 0.5–1.3)
GLUCOSE BLDC GLUCOMTR-MCNC: 92 MG/DL — SIGNIFICANT CHANGE UP (ref 70–99)
GLUCOSE SERPL-MCNC: 98 MG/DL — SIGNIFICANT CHANGE UP (ref 70–99)
HCT VFR BLD CALC: 34.1 % — LOW (ref 39–50)
HGB BLD-MCNC: 10.5 G/DL — LOW (ref 13–17)
INR BLD: 3.32 RATIO — HIGH (ref 0.88–1.16)
MCHC RBC-ENTMCNC: 30.8 GM/DL — LOW (ref 32–36)
MCHC RBC-ENTMCNC: 31.2 PG — SIGNIFICANT CHANGE UP (ref 27–34)
MCV RBC AUTO: 101.2 FL — HIGH (ref 80–100)
NRBC # BLD: 0 /100 WBCS — SIGNIFICANT CHANGE UP (ref 0–0)
PLATELET # BLD AUTO: 392 K/UL — SIGNIFICANT CHANGE UP (ref 150–400)
POTASSIUM SERPL-MCNC: 3.7 MMOL/L — SIGNIFICANT CHANGE UP (ref 3.5–5.3)
POTASSIUM SERPL-SCNC: 3.7 MMOL/L — SIGNIFICANT CHANGE UP (ref 3.5–5.3)
PROTHROM AB SERPL-ACNC: 38.3 SEC — HIGH (ref 10–12.9)
RBC # BLD: 3.37 M/UL — LOW (ref 4.2–5.8)
RBC # FLD: 21.5 % — HIGH (ref 10.3–14.5)
SODIUM SERPL-SCNC: 137 MMOL/L — SIGNIFICANT CHANGE UP (ref 135–145)
WBC # BLD: 15.96 K/UL — HIGH (ref 3.8–10.5)
WBC # FLD AUTO: 15.96 K/UL — HIGH (ref 3.8–10.5)

## 2019-05-05 RX ORDER — ONDANSETRON 8 MG/1
4 TABLET, FILM COATED ORAL ONCE
Qty: 0 | Refills: 0 | Status: COMPLETED | OUTPATIENT
Start: 2019-05-05 | End: 2019-05-05

## 2019-05-05 RX ORDER — PHYTONADIONE (VIT K1) 5 MG
2.5 TABLET ORAL ONCE
Qty: 0 | Refills: 0 | Status: COMPLETED | OUTPATIENT
Start: 2019-05-05 | End: 2019-05-05

## 2019-05-05 RX ADMIN — ATORVASTATIN CALCIUM 20 MILLIGRAM(S): 80 TABLET, FILM COATED ORAL at 21:57

## 2019-05-05 RX ADMIN — Medication 650 MILLIGRAM(S): at 06:35

## 2019-05-05 RX ADMIN — Medication 2.5 MILLIGRAM(S): at 12:18

## 2019-05-05 RX ADMIN — Medication 650 MILLIGRAM(S): at 08:52

## 2019-05-05 RX ADMIN — Medication 2.5 MILLIGRAM(S): at 12:20

## 2019-05-05 RX ADMIN — SEVELAMER CARBONATE 800 MILLIGRAM(S): 2400 POWDER, FOR SUSPENSION ORAL at 12:21

## 2019-05-05 RX ADMIN — OXYCODONE AND ACETAMINOPHEN 1 TABLET(S): 5; 325 TABLET ORAL at 18:29

## 2019-05-05 RX ADMIN — GABAPENTIN 300 MILLIGRAM(S): 400 CAPSULE ORAL at 12:16

## 2019-05-05 RX ADMIN — OXYCODONE AND ACETAMINOPHEN 1 TABLET(S): 5; 325 TABLET ORAL at 19:52

## 2019-05-05 RX ADMIN — FINASTERIDE 5 MILLIGRAM(S): 5 TABLET, FILM COATED ORAL at 12:16

## 2019-05-05 RX ADMIN — OXYCODONE AND ACETAMINOPHEN 1 TABLET(S): 5; 325 TABLET ORAL at 11:30

## 2019-05-05 RX ADMIN — Medication 60 MILLIGRAM(S): at 18:29

## 2019-05-05 RX ADMIN — Medication 60 MILLIGRAM(S): at 05:15

## 2019-05-05 RX ADMIN — Medication 1 DROP(S): at 12:43

## 2019-05-05 RX ADMIN — CHOLESTYRAMINE 4 GRAM(S): 4 POWDER, FOR SUSPENSION ORAL at 12:11

## 2019-05-05 RX ADMIN — Medication 100 MILLIGRAM(S): at 12:12

## 2019-05-05 RX ADMIN — Medication 1 DROP(S): at 22:46

## 2019-05-05 RX ADMIN — SEVELAMER CARBONATE 800 MILLIGRAM(S): 2400 POWDER, FOR SUSPENSION ORAL at 18:29

## 2019-05-05 RX ADMIN — SODIUM CHLORIDE 3 MILLILITER(S): 9 INJECTION INTRAMUSCULAR; INTRAVENOUS; SUBCUTANEOUS at 14:48

## 2019-05-05 RX ADMIN — SODIUM CHLORIDE 3 MILLILITER(S): 9 INJECTION INTRAMUSCULAR; INTRAVENOUS; SUBCUTANEOUS at 22:46

## 2019-05-05 RX ADMIN — SEVELAMER CARBONATE 800 MILLIGRAM(S): 2400 POWDER, FOR SUSPENSION ORAL at 12:10

## 2019-05-05 RX ADMIN — SODIUM CHLORIDE 3 MILLILITER(S): 9 INJECTION INTRAMUSCULAR; INTRAVENOUS; SUBCUTANEOUS at 05:13

## 2019-05-05 RX ADMIN — Medication 60 MILLIGRAM(S): at 12:16

## 2019-05-05 RX ADMIN — Medication 60 MILLIGRAM(S): at 23:08

## 2019-05-05 RX ADMIN — OXYCODONE AND ACETAMINOPHEN 1 TABLET(S): 5; 325 TABLET ORAL at 14:23

## 2019-05-05 RX ADMIN — ONDANSETRON 4 MILLIGRAM(S): 8 TABLET, FILM COATED ORAL at 21:57

## 2019-05-05 RX ADMIN — PANTOPRAZOLE SODIUM 40 MILLIGRAM(S): 20 TABLET, DELAYED RELEASE ORAL at 05:15

## 2019-05-05 NOTE — PROGRESS NOTE ADULT - PROBLEM SELECTOR PROBLEM 6
CKD (chronic kidney disease)
Afib
CKD (chronic kidney disease)
CKD (chronic kidney disease)
Recurrent Clostridium difficile diarrhea
Afib
CKD (chronic kidney disease)
Recurrent Clostridium difficile diarrhea

## 2019-05-05 NOTE — PROGRESS NOTE ADULT - PROBLEM SELECTOR PROBLEM 10
Peripheral vascular disease

## 2019-05-05 NOTE — PROGRESS NOTE ADULT - PROBLEM SELECTOR PROBLEM 7
Chronic diastolic congestive heart failure
CKD (chronic kidney disease)
Afib
Chronic diastolic congestive heart failure
Chronic diastolic congestive heart failure
Afib
CKD (chronic kidney disease)
Chronic diastolic congestive heart failure
Afib

## 2019-05-05 NOTE — CHART NOTE - NSCHARTNOTEFT_GEN_A_CORE
Pt seen and examined with house staff.  Plan formulated and reviewed on rounds    Briefly, 83 y/o male with AFib on coumadin, ESRD on HD (MWF), HTN, RA on MTX and steroids, R leg amputation, severe CDI and megacolon, Severe PAD s/p LLE endarterectomy 2 weeks PTA with dr dugan admitted with post op infection/sepsis at surgical site--s/p flap on 4/23.    Pt noted to have possible RML elongated endobronchial lesion and FERNANDO lesion on CT ordered for cough and bloody sputum.    ROS o/w negative     Stable vitals No fevers  Awake and alert  L ankle ulcer wo discharge    INR remains elevated    For possible FOB and Bx on Monday--Cont to HOLD coumadin--If INR drifts below 2, then start UFH gtt.    May have to correct INR prior to Bx--HD on Monday morning along with FFP infusion as needed--check INR prior to HD and post HD  Will need stress dose steroids on day of surgery--HC 100mg IV X 3 doses  Local wound care  Cont neurontin today    Above d/w pt

## 2019-05-05 NOTE — PROGRESS NOTE ADULT - PROBLEM SELECTOR PLAN 8
-hold MTX given pancytopenic  -continue steroids
Last echo 2018 EF of 60-65%
-hold MTX given pancytopenic  -continue steroids
-hold MTX given pancytopenic  -continue steroids
ESRD on dialysis MWF  -continue dialysis  -Nephro recs appreciated
-hold MTX given pancytopenic  -continue steroids
ESRD on dialysis MWF  -continue dialysis  -Nephro recs appreciated
Last echo 2018 EF of 60-65%
ESRD on dialysis MWF  -continue dialysis  -Nephro recs appreciated

## 2019-05-05 NOTE — PROGRESS NOTE ADULT - SUBJECTIVE AND OBJECTIVE BOX
CHIEF COMPLAINT:    SUBJECTIVE:   83 y/o male with ESRD on HD (MWF), HTN, RA on MTX and steroids, R leg amputation, severe CDI and megacolon, Severe PAD s/p LLE endarterectomy (4/1/19)with dr Clement presents with T 100.5 and DC from L groin.  Pt also c/o R arm pain/swelling where pt had fistulogram of the AC fistula on last admission.    5/5 Pt seen and examined at bedside this morning. Pt has no complaints other than left leg soreness.  REVIEW OF SYSTEMS:  CONSTITUTIONAL: No weakness, fevers or chills  EYES/ENT: No visual changes;  No vertigo or throat pain   NECK: No pain or stiffness  RESPIRATORY: No cough, wheezing, hemoptysis; No shortness of breath  CARDIOVASCULAR: No chest pain or palpitations  GASTROINTESTINAL: No abdominal or epigastric pain. No nausea, vomiting, or hematemesis; No diarrhea or constipation. No melena or hematochezia.  GENITOURINARY: No dysuria, frequency or hematuria  NEUROLOGICAL: No numbness or weakness  SKIN: No itching, burning, rashes, or lesions   Ext: left leg soreness  All other review of systems is negative unless indicated above    Vital Signs Last 24 Hrs  T(C): 37.2 (05 May 2019 04:41), Max: 37.2 (05 May 2019 04:41)  T(F): 98.9 (05 May 2019 04:41), Max: 98.9 (05 May 2019 04:41)  HR: 71 (05 May 2019 04:41) (70 - 75)  BP: 125/39 (05 May 2019 04:41) (125/39 - 150/48)  BP(mean): --  RR: 18 (05 May 2019 04:41) (17 - 18)  SpO2: 99% (05 May 2019 04:41) (95% - 100%)    I&O's Summary      CAPILLARY BLOOD GLUCOSE      POCT Blood Glucose.: 92 mg/dL (05 May 2019 08:30)  POCT Blood Glucose.: 145 mg/dL (04 May 2019 17:53)      PHYSICAL EXAM:  Constitutional: NAD, awake and alert, well-developed  HEENT: PERR, EOMI, Normal Hearing, MMM  Neck: Soft and supple, No LAD  Respiratory: good air movement, decreased BS on the right, No wheezing, rales or rhonchi  Cardiovascular: S1 and S2, regular rate and rhythm, no Murmurs, gallops or rubs  Gastrointestinal: Bowel Sounds present, soft, nontender, nondistended, no guarding, no rebound  Extremities: No peripheral edema. R BKA, Left surgical site has staples and is clean dry and intact. No drainage or erythema  Vascular: 2+ peripheral pulses  Neurological: A/O x 3, no focal deficits  Skin:  LLE incision site clean and dry, no erythema, no drainage     MEDICATIONS:  MEDICATIONS  (STANDING):  allopurinol 100 milliGRAM(s) Oral <User Schedule>  artificial tears (preservative free) Ophthalmic Solution 1 Drop(s) Both EYES three times a day  atorvastatin 20 milliGRAM(s) Oral at bedtime  cholestyramine Powder (Sugar-Free) 4 Gram(s) Oral daily  diltiazem    Tablet 60 milliGRAM(s) Oral four times a day  doxercalciferol Injectable 1 MICROGram(s) IV Push <User Schedule>  epoetin nelly Injectable 43117 Unit(s) IV Push <User Schedule>  finasteride 5 milliGRAM(s) Oral daily  gabapentin 300 milliGRAM(s) Oral daily  lidocaine/prilocaine Cream 1 Application(s) Topical daily  pantoprazole    Tablet 40 milliGRAM(s) Oral before breakfast  phytonadione   Solution 2.5 milliGRAM(s) Oral once  predniSONE   Tablet 2.5 milliGRAM(s) Oral every other day  predniSONE   Tablet 5 milliGRAM(s) Oral every other day  sevelamer hydrochloride Oral Tab/Cap - Peds 800 milliGRAM(s) Oral three times a day with meals  silver sulfADIAZINE 1% Cream 1 Application(s) Topical daily  sodium chloride 0.9% lock flush 3 milliLiter(s) IV Push every 8 hours      LABS: All Labs Reviewed:                        10.5   15.96 )-----------( 392      ( 05 May 2019 06:57 )             34.1     05-05    137  |  104  |  30<H>  ----------------------------<  98  3.7   |  26  |  3.54<H>    Ca    8.2<L>      05 May 2019 06:57  Phos  3.3     05-03    TPro  x   /  Alb  2.0<L>  /  TBili  x   /  DBili  x   /  AST  x   /  ALT  x   /  AlkPhos  x   05-03    PT/INR - ( 05 May 2019 06:57 )   PT: 38.3 sec;   INR: 3.32 ratio         PTT - ( 04 May 2019 07:51 )  PTT:45.4 sec      Blood Culture:     RADIOLOGY/EKG:  < from: US Thyroid + Parathyroid (04.29.19 @ 17:06) >    IMPRESSION:     Dominant left thyroid nodule for which fine-needle aspiration could be   considered. Alternatively 6 month follow-up can be obtained.    < end of copied text >      < from: CT Chest No Cont (04.28.19 @ 12:17) >  IMPRESSION:   Continued increase size of tubular oblong opacity in the right middle   lobe, possibly endobronchial mass with impacted airways. Irregular   opacity in the posterior left upper lobe, measuring 1.4 cm. PET/CT is   recommended for further evaluation.    Left thyroid nodule, measuring 2.9 cm. Further evaluation with thyroid   ultrasound is recommended.    < end of copied text >      < from: Xray Chest 1 View- PORTABLE-Urgent (04.27.19 @ 15:51) >  Impression:    Right lower lobe opacity may represent pneumonia versus neoplasm versus   vascular artifact, recommend correlation with clinical symptoms and   short-term follow-up to resolution    < end of copied text >      < from: US Duplex Arterial Lower Ext Ltd, Left (04.19.19 @ 12:13) >  Impression:    Focal ultrasound of the left groin demonstrates no evidence of   pseudoaneurysm.    Hematoma anterior to the left common femoral artery measuring   approximately 3.2 x 0.9 x 5.0 cm.    There is plaque within the proximal left femoral artery, with an elevated   velocity of 110 cm/s.        < from: US Duplex Venous Upper Ext Ltd, Right (04.19.19 @ 12:16) >  IMPRESSION:     No evidence of right upper extremity deep venous thrombosis.  Markedly increased velocities within the patient's arteriovenous fistula   raising suspicious for hemodynamically significant stenosis.    DVT PPX:  On coumadin, held for procedure  ADVANCED DIRECTIVE:    DISPOSITION:

## 2019-05-05 NOTE — PROGRESS NOTE ADULT - PROBLEM SELECTOR PLAN 10
on coumadin.  coumadin held  Supratherapeutic INR of 3.01
on coumadin.
on coumadin.  coumadin held  Supratherapeutic INR of 3.22
on coumadin.  coumadin held  Supratherapeutic INR of 3.46
on coumadin.  coumadin held  Supratherapeutic INR of 6.3. Will give vit K  2.5

## 2019-05-05 NOTE — PROGRESS NOTE ADULT - PROBLEM SELECTOR PLAN 9
on coumadin
on coumadin.
on coumadin. Held for procedure
-hold MTX given pancytopenic  -continue steroids
Last echo 2018 EF of 60-65%
on coumadin.
on coumadin. Held because of bleeding. Hold to make INR optimal for procedure tomorrow
-hold MTX given pancytopenic  -continue steroids
Last echo 2018 EF of 60-65%
on coumadin.
Last echo 2018 EF of 60-65%

## 2019-05-05 NOTE — PROGRESS NOTE ADULT - ASSESSMENT
83 y/o male with ESRD on HD (MWF), HTN, RA on MTX and steroids, R leg amputation, severe CDI and megacolon, Severe PAD s/p LLE endarterectomy 2 weeks ago with dr dugan presents with fever.    ESRD  -HD monday, plan first shift with procedure later in day (d/c with charge RN on 5/4)  -If FFP required, order per medical team to be entered today, for AM HD monday  -K protocol, UF as tolerated  -Greg primary unit    ID  Infectious disease follow up  -Bronch pending  -Abx renally dosed    Anemia  -LETICIA protocol    D/c with RN staff at bedside  LIKP to resume care in AM

## 2019-05-05 NOTE — PROGRESS NOTE ADULT - SUBJECTIVE AND OBJECTIVE BOX
Patient is a 84y old  Male who presents with a chief complaint of L groin DC (28 Apr 2019 17:14)      HPI:  85 y/o male with ESRD on HD (MWF), HTN, RA on MTX and steroids, R leg amputation, severe CDI and megacolon, Severe PAD s/p LLE endarterectomy 2 weeks ago with dr dugan presents with T 100 and DC from L groin.  Pt also c/o R arm pain/swelling where pt had fistulogram of the AC fistula on last admission.  RUL dopplers negative for DVT.  L groin sono--negative for pseudo aneurysm and + hematoma.  Pt given 2.1 L IVF, IV vanco/aztreonam in ED  On my exam in HD suite, awake, chronically ill appearing, NAD, daughter at bedside. Pt seen by Dr dugan in ED.  CXR--clear    TTE (10/18)--mild-mod MR/EF 60%    Pt d/w daughter regarding advance directives--Pt is FULL RESUSCITATION (19 Apr 2019 16:12)  events noted while admitted muscle flap and plastic surgery care developed increased cough with possible bloody streaked sputuim / none present now and RN at bedside, both noticed thhick yellow sputum production    4/30  data discussed with RN at bedside  no cp  improved breathing    5/1  feels the same  decreased cough  DTR is contacted and data discussed regarding abnormal ct scan findings  5/2  data discussed with pt's DTR and medical team as well  although BRONCH is appropriate given ct scan findings, it can not be done yet till INR corrected specially in pt with ESRD.  DATA DISCUSSED WITH DR JORGE MASSEY AS WELL for thoracic surgery consult  5/3  data discussed with thoracic surgery yesterday  discussed with dr Rashid today  pt is resting comfortably  no resp distress  no hemoptysis    5/4  No acute events occurred overnight  Discussed with hospitalist/resident team and daughter     5/5  INR still elevated   No acute pulmonary events occurred overnight    MEDICATIONS  (STANDING):  allopurinol 100 milliGRAM(s) Oral <User Schedule>  artificial tears (preservative free) Ophthalmic Solution 1 Drop(s) Both EYES three times a day  atorvastatin 20 milliGRAM(s) Oral at bedtime  cholestyramine Powder (Sugar-Free) 4 Gram(s) Oral daily  diltiazem    Tablet 60 milliGRAM(s) Oral four times a day  doxercalciferol Injectable 1 MICROGram(s) IV Push <User Schedule>  epoetin nelly Injectable 78685 Unit(s) IV Push <User Schedule>  finasteride 5 milliGRAM(s) Oral daily  gabapentin 300 milliGRAM(s) Oral daily  lidocaine/prilocaine Cream 1 Application(s) Topical daily  pantoprazole    Tablet 40 milliGRAM(s) Oral before breakfast  predniSONE   Tablet 2.5 milliGRAM(s) Oral every other day  predniSONE   Tablet 5 milliGRAM(s) Oral every other day  sevelamer hydrochloride Oral Tab/Cap - Peds 800 milliGRAM(s) Oral three times a day with meals  silver sulfADIAZINE 1% Cream 1 Application(s) Topical daily  sodium chloride 0.9% lock flush 3 milliLiter(s) IV Push every 8 hours    MEDICATIONS  (PRN):  acetaminophen   Tablet .. 650 milliGRAM(s) Oral every 6 hours PRN Temp greater or equal to 38.5C (101.3F)  acetaminophen   Tablet .. 650 milliGRAM(s) Oral every 6 hours PRN Mild Pain (1 - 3)  ALBUTerol    90 MICROgram(s) HFA Inhaler 2 Puff(s) Inhalation every 6 hours PRN Shortness of Breath and/or Wheezing  guaiFENesin   Syrup  (Sugar-Free) 100 milliGRAM(s) Oral every 6 hours PRN Cough  oxyCODONE    5 mG/acetaminophen 325 mG 1 Tablet(s) Oral every 4 hours PRN Moderate Pain (4 - 6)    Vital Signs Last 24 Hrs  T(C): 37.2 (05 May 2019 04:41), Max: 37.2 (05 May 2019 04:41)  T(F): 98.9 (05 May 2019 04:41), Max: 98.9 (05 May 2019 04:41)  HR: 71 (05 May 2019 04:41) (70 - 75)  BP: 125/39 (05 May 2019 04:41) (125/39 - 150/48)  BP(mean): --  RR: 18 (05 May 2019 04:41) (17 - 18)  SpO2: 99% (05 May 2019 04:41) (95% - 100%)    PHYSICAL EXAM  General Appearance: cooperative, no acute distress,   HEENT: PERRL, conjunctiva clear, EOM's intact, non injected pharynx, no exudate, TM   normal  Neck: Supple, , no adenopathy, thyroid: not enlarged, no carotid bruit or JVD  Back: Symmetric, no  tenderness,no soft tissue tenderness  Lungs: Clear to auscultation bilateral,no adventitious breath sounds, normal   expiratory phase  Heart: Regular rate and rhythm, S1, S2 normal, no murmur, rub or gallop  Abdomen: Soft, non-tender, bowel sounds active , no hepatosplenomegaly  Extremities: no cyanosis or edema, no joint swelling, hx AKA right side  Skin: Skin color, texture normal, no rashes   Neurologic: Alert and oriented X3 , cranial nerves intact, sensory and motor normal,    ECG:    LABS:                        9.8    16.81 )-----------( 443      ( 01 May 2019 06:35 )             32.1                           9.9    13.16 )-----------( 358      ( 30 Apr 2019 06:08 )             32.1                           10.1   7.96  )-----------( 334      ( 29 Apr 2019 07:08 )             33.2     04-29 04-30    142  |  111<H>  |  19  ----------------------------<  87  3.8   |  24  |  2.70<H>    Ca    8.3<L>      30 Apr 2019 06:08  Phos  3.4     04-29    TPro  x   /  Alb  1.8<L>  /  TBili  x   /  DBili  x   /  AST  x   /  ALT  x   /  AlkPhos  x   04-29  138  |  108  |  35<H>  ----------------------------<  95  4.4   |  22  |  4.61<H>    Ca    8.0<L>      29 Apr 2019 07:08              PT/INR - ( 29 Apr 2019 07:08 )   PT: 39.9 sec;   INR: 3.46 ratio                   RADIOLOGY & ADDITIONAL STUDIES:  < from: CT Chest No Cont (04.28.19 @ 12:17) >  PROCEDURE:   CT of the Chest was performed without intravenous contrast.  Sagittal and coronal reformats were performed.    FINDINGS:    LUNGS AND LARGE AIRWAYS: Emphysema. Continued increase size of tubular   oblong opacity in the right middle lobe, possibly endobronchial mass with   impacted airways. Possible associated broncholiths. Irregular opacity in   the posterior left upper lobe (series 3, image 60), measuring 1.4 cm.  PLEURA: Small left pleural effusion. Trace right pleural effusion.  VESSELS: Atherosclerotic calcifications.   HEART: Heart size is normal. No pericardial effusion.  MEDIASTINUM AND [place]: No lymphadenopathy.  CHEST WALL AND LOWER NECK: Left thyroid nodule, measuring 2.9 cm.   Extension of the thyroid gland into the superior mediastinum.   VISUALIZED UPPER ABDOMEN: Cholelithiasis. Unchanged probable cyst in   hepatic segment 8.  BONES: Degenerative changes of the spine.    IMPRESSION:   Continued increase size of tubular oblong opacity in the right middle   lobe, possibly endobronchial mass with impacted airways. Irregular   opacity in the posterior left upper lobe, measuring 1.4 cm. PET/CT is   recommended for further evaluation.    Left thyroid nodule, measuring 2.9 cm. Further evaluation with thyroid   ultrasound is recommended.    < end of copied text >

## 2019-05-05 NOTE — PROGRESS NOTE ADULT - SUBJECTIVE AND OBJECTIVE BOX
Patient is a 84y Male who reports no complaints overnight. Sittin gin chair     REVIEW OF SYSTEMS:    CONSTITUTIONAL: stable weakness, fevers or chills  RESPIRATORY: No cough, wheezing, hemoptysis; stable shortness of breath  CARDIOVASCULAR: No chest pain or palpitations  GENITOURINARY: No dysuria, frequency or hematuria  All other review of systems is negative unless indicated above.    MEDICATIONS  (STANDING):  allopurinol 100 milliGRAM(s) Oral <User Schedule>  artificial tears (preservative free) Ophthalmic Solution 1 Drop(s) Both EYES three times a day  atorvastatin 20 milliGRAM(s) Oral at bedtime  cholestyramine Powder (Sugar-Free) 4 Gram(s) Oral daily  diltiazem    Tablet 60 milliGRAM(s) Oral four times a day  doxercalciferol Injectable 1 MICROGram(s) IV Push <User Schedule>  epoetin nelly Injectable 94979 Unit(s) IV Push <User Schedule>  finasteride 5 milliGRAM(s) Oral daily  gabapentin 300 milliGRAM(s) Oral daily  lidocaine/prilocaine Cream 1 Application(s) Topical daily  pantoprazole    Tablet 40 milliGRAM(s) Oral before breakfast  phytonadione   Solution 2.5 milliGRAM(s) Oral once  predniSONE   Tablet 2.5 milliGRAM(s) Oral every other day  predniSONE   Tablet 5 milliGRAM(s) Oral every other day  sevelamer hydrochloride Oral Tab/Cap - Peds 800 milliGRAM(s) Oral three times a day with meals  silver sulfADIAZINE 1% Cream 1 Application(s) Topical daily  sodium chloride 0.9% lock flush 3 milliLiter(s) IV Push every 8 hours    MEDICATIONS  (PRN):  acetaminophen   Tablet .. 650 milliGRAM(s) Oral every 6 hours PRN Temp greater or equal to 38.5C (101.3F)  acetaminophen   Tablet .. 650 milliGRAM(s) Oral every 6 hours PRN Mild Pain (1 - 3)  ALBUTerol    90 MICROgram(s) HFA Inhaler 2 Puff(s) Inhalation every 6 hours PRN Shortness of Breath and/or Wheezing  guaiFENesin   Syrup  (Sugar-Free) 100 milliGRAM(s) Oral every 6 hours PRN Cough  oxyCODONE    5 mG/acetaminophen 325 mG 1 Tablet(s) Oral every 4 hours PRN Moderate Pain (4 - 6)        T(C): , Max: 37.2 (05-05-19 @ 04:41)  T(F): , Max: 98.9 (05-05-19 @ 04:41)  HR: 79 (05-05-19 @ 11:48)  BP: 142/32 (05-05-19 @ 11:48)  BP(mean): --  RR: 16 (05-05-19 @ 11:48)  SpO2: 100% (05-05-19 @ 11:48)  Wt(kg): --      Weight (kg): 74.7 (05-05 @ 06:30)    PHYSICAL EXAM:    Constitutional: NAD, frail, cachectic  Neck: No LAD, No JVD  Respiratory: dist BS  Cardiovascular: S1 and S2  Gastrointestinal: BS+, soft, NT/ND  Extremities:  peripheral edema  Neurological: A/O x 3, no focal deficits  Psychiatric: Normal mood, normal affect  : No Feldman  Skin: brusiing  Access: R avf + thrill      LABS:                        10.5   15.96 )-----------( 392      ( 05 May 2019 06:57 )             34.1     05 May 2019 06:57    137    |  104    |  30     ----------------------------<  98     3.7     |  26     |  3.54   04 May 2019 07:51    137    |  103    |  17     ----------------------------<  84     3.8     |  27     |  2.54   03 May 2019 14:00    136    |  104    |  31     ----------------------------<  114    3.9     |  25     |  3.77     Ca    8.2        05 May 2019 06:57  Ca    8.1        04 May 2019 07:51  Ca    8.1        03 May 2019 14:00  Phos  3.3       03 May 2019 14:00    TPro  x      /  Alb  2.0    /  TBili  x      /  DBili  x      /  AST  x      /  ALT  x      /  AlkPhos  x      03 May 2019 14:00          Urine Studies:          RADIOLOGY & ADDITIONAL STUDIES:

## 2019-05-05 NOTE — PROGRESS NOTE ADULT - PROBLEM SELECTOR PROBLEM 5
Afib
Recurrent Clostridium difficile diarrhea
Afib
Afib
Pancytopenia
Afib
Pain of right upper extremity
Pancytopenia
Recurrent Clostridium difficile diarrhea

## 2019-05-05 NOTE — PROGRESS NOTE ADULT - ASSESSMENT
85 y/o male with ESRD on HD (MWF), HTN, RA on MTX and steroids, R leg amputation, severe CDI and megacolon, Severe PAD s/p LLE endarterectomy 2 weeks ago with dr dugan presents with T 100 and DC from L groin.  Pt also c/o R arm pain/swelling where pt had fistulogram of the AC fistula on last admission.  RUL dopplers negative for DVT.  L groin sono--negative for pseudo aneurysm and + hematoma.  Pt given 2.1 L IVF, IV vanco/aztreonam in ED  On my exam in HD suite, awake, chronically ill appearing, NAD, daughter at bedside. Pt seen by Dr dugan in ED.  CXR--clear    TTE (10/18)--mild-mod MR/EF 60%    Pt d/w daughter regarding advance directives--Pt is FULL RESUSCITATION (19 Apr 2019 16:12)  events noted while admitted muscle flap and plastic surgery care developed increased cough with possible bloody streaked sputuim / none present now and RN at bedside, both noticed thhick yellow sputum production  ct scan findings personally reveiwed / old films are not available on PACS at this time for comparison  Continued increase size of tubular oblong opacity in the right middle   lobe, possibly endobronchial mass with impacted airways. Irregular   opacity in the posterior left upper lobe, measuring 1.4 cm    Assessment / plan:  suspected pneumonia with mucus plugging RML  can not r/o endobronchial lesion  separate FERNANDO pulm nodule seen needs further eval  after antibiotic therapy  pt is not good candidate for bronch at this time  outpt PET / ct is reasonable only after course of antibiotics completed  ESRD on dilaysis now  hx Sepsis, due to unspecified organism: 2/2 poorly healing wounds b/l  Sleep apnea, obstructive: Requires home 02 therapy, and treatment with BIPAP  bibasilar atelectasis with associated small effusions  Prior films reviewed with ct scan abd/pelvis done 7/2018 and 10/2018 with similar RLL findings NOW increased in size which raises suspicion for malignancy higher with this finding with possible endobronchial mass also present / all discussed with his DTR / may need BRONCH for diagnostic purposes / will have  eval with Dr Hall yesterday.  plan for BRONCH once INR corrected  Appreciate CTS recommendations 83 y/o male with ESRD on HD (MWF), HTN, RA on MTX and steroids, R leg amputation, severe CDI and megacolon, Severe PAD s/p LLE endarterectomy 2 weeks ago with dr dugan presents with T 100 and DC from L groin.  Pt also c/o R arm pain/swelling where pt had fistulogram of the AC fistula on last admission.  RUL dopplers negative for DVT.  L groin sono--negative for pseudo aneurysm and + hematoma.  Pt given 2.1 L IVF, IV vanco/aztreonam in ED  On my exam in HD suite, awake, chronically ill appearing, NAD, daughter at bedside. Pt seen by Dr dugan in ED.  CXR--clear    TTE (10/18)--mild-mod MR/EF 60%    Pt d/w daughter regarding advance directives--Pt is FULL RESUSCITATION (19 Apr 2019 16:12)  events noted while admitted muscle flap and plastic surgery care developed increased cough with possible bloody streaked sputuim / none present now and RN at bedside, both noticed thhick yellow sputum production  ct scan findings personally reveiwed / old films are not available on PACS at this time for comparison  Continued increase size of tubular oblong opacity in the right middle   lobe, possibly endobronchial mass with impacted airways. Irregular   opacity in the posterior left upper lobe, measuring 1.4 cm    Assessment / plan:  suspected pneumonia with mucus plugging RML  can not r/o endobronchial lesion  separate FERNANDO pulm nodule seen needs further eval  after antibiotic therapy  pt is not good candidate for bronch at this time  outpt PET / ct is reasonable only after course of antibiotics completed  ESRD on dilaysis now  Sleep apnea, obstructive: Requires home O2 therapy, and treatment with BIPAP  bibasilar atelectasis with associated small effusions  Prior films reviewed with ct scan abd/pelvis done 7/2018 and 10/2018 with similar RLL findings NOW increased in size which raises suspicion for malignancy higher with this finding with possible endobronchial mass also present / all discussed with his DTR   plan for BRONCH once INR corrected  Appreciate CTS recommendations

## 2019-05-05 NOTE — PROGRESS NOTE ADULT - PROBLEM SELECTOR PLAN 1
Pt who had an endarterectomy April 1st who developed an infection at entry site and fever of 100.5 on admission  -now afebrile  -dopplers of the LLE showed hematoma anterior to the left common femoral artery measuring approximately 3.2 x 0.9 x 5.0 cm  -Dr. Clement recommendation appreciated:Debridement done.   Pain control w/ tylenol, tramadol if needed and then possibly percocet. Hold dilaudid can make drowsy. Only when absolutely necessary.  -wound cx growing E faecium. Vanc resistant   s/p aztreonam 4 days, s/p daptomycin 8days, s/p vancomycin, s/p cefepime 10 days  - continue wound care

## 2019-05-05 NOTE — PROGRESS NOTE ADULT - PROBLEM SELECTOR PROBLEM 3
Pancytopenia
Confusion
Pancytopenia
Pancytopenia
Supratherapeutic INR
Confusion
Pain of right upper extremity
Pancytopenia

## 2019-05-05 NOTE — PROGRESS NOTE ADULT - PROBLEM SELECTOR PROBLEM 9
Peripheral vascular disease
Rheumatoid arthritis
Chronic diastolic congestive heart failure
Peripheral vascular disease
Peripheral vascular disease
Chronic diastolic congestive heart failure
Peripheral vascular disease
Rheumatoid arthritis
Chronic diastolic congestive heart failure

## 2019-05-05 NOTE — PROGRESS NOTE ADULT - PROBLEM SELECTOR PROBLEM 4
Recurrent Clostridium difficile diarrhea
Pancytopenia
Pain of right upper extremity
Recurrent Clostridium difficile diarrhea
Recurrent Clostridium difficile diarrhea
Anemia
Pain of right upper extremity
Pancytopenia
Recurrent Clostridium difficile diarrhea

## 2019-05-05 NOTE — PROGRESS NOTE ADULT - PROBLEM SELECTOR PROBLEM 8
Rheumatoid arthritis
Chronic diastolic congestive heart failure
CKD (chronic kidney disease)
Rheumatoid arthritis
Rheumatoid arthritis
CKD (chronic kidney disease)
Chronic diastolic congestive heart failure
Rheumatoid arthritis
CKD (chronic kidney disease)

## 2019-05-05 NOTE — PROGRESS NOTE ADULT - PROBLEM SELECTOR PLAN 2
Pt w/ R and L lung mass seen on CT. R endobronchial mass enlarging from previous CT and the L lung mass is new. Likely cause of hemoptysis. Pt also with Thyroid mass increase suspicion for malignancy  -chest xray as above: PNA vs neoplasm vs artifact  -CT scan of chest as above  -Thyroid US results as above. Needs FNA. Can be outpatient  -pulmonary consult appreciated PET scan outpatient   -Thoracic surgery will do flexible bronchoscopy next week Monday  -will need HD, and stress dose steroids day of procedure +/- FFP if the INR is not <2 in the AM  NPO after midnight

## 2019-05-05 NOTE — PROGRESS NOTE ADULT - PROBLEM SELECTOR PLAN 4
Pt w/ hx of recurrent C diff w/ megacolon  -ID recs appreciated  -continue Vancomycin PO( day #3)
Pt w/ hx of recurrent C diff w/ megacolon  -ID recs appreciated  -continue Vancomycin PO( day #8)
Pt w/ hx of recurrent C diff w/ megacolon  -ID recs appreciated  -continue Vancomycin PO( day #5)
Pt w/ pancytopenia improving  - likely 2/2 MTX use  -MTX held  -continue to monitor
- s/p R arm fistula revision with fistulogram on March 28, 2019 by Dr. Urbano CURRIE venous doppler - no evidence of DVT; markedly increased velocity in AV fistula of arm raising suspicion of hemodynamically significant stenosis  - Vascular surgery recs appreciated.
Pt w/ hx of recurrent C diff w/ megacolon  -ID recs appreciated  -continue Vancomycin PO( day #4)
Pt w/ hx of recurrent C diff w/ megacolon  -ID recs appreciated  -continue Vancomycin PO( day #7)
- s/p R arm fistula revision with fistulogram on March 28, 2019 by Dr. Urbano CURRIE venous doppler - no evidence of DVT; markedly increased velocity in AV fistula of arm raising suspicion of hemodynamically significant stenosis  - Vascular surgery recs appreciated.
Pt w/ anemia that is stable  likely 2/2 anemia of chronic disease given ESRD on dialysis  -will continue to monitor H&H  Pt was also pancytopenic which resolved because MTX was held so could have been another contributory factor
Pt w/ hx of recurrent C diff w/ megacolon  -ID recs appreciated  -continue Vancomycin PO( day #6)
Pt w/ pancytopenia improving  - likely 2/2 MTX use  -MTX held  -continue to monitor

## 2019-05-05 NOTE — PROGRESS NOTE ADULT - PROBLEM SELECTOR PROBLEM 1
Surgical site infection

## 2019-05-06 ENCOUNTER — RESULT REVIEW (OUTPATIENT)
Age: 84
End: 2019-05-06

## 2019-05-06 ENCOUNTER — APPOINTMENT (OUTPATIENT)
Dept: THORACIC SURGERY | Facility: HOSPITAL | Age: 84
End: 2019-05-06

## 2019-05-06 LAB
ADD ON TEST-SPECIMEN IN LAB: SIGNIFICANT CHANGE UP
ALBUMIN SERPL ELPH-MCNC: 2 G/DL — LOW (ref 3.3–5)
ANION GAP SERPL CALC-SCNC: 9 MMOL/L — SIGNIFICANT CHANGE UP (ref 5–17)
BUN SERPL-MCNC: 48 MG/DL — HIGH (ref 7–23)
CALCIUM SERPL-MCNC: 8.1 MG/DL — LOW (ref 8.5–10.1)
CHLORIDE SERPL-SCNC: 105 MMOL/L — SIGNIFICANT CHANGE UP (ref 96–108)
CO2 SERPL-SCNC: 23 MMOL/L — SIGNIFICANT CHANGE UP (ref 22–31)
CREAT SERPL-MCNC: 4.78 MG/DL — HIGH (ref 0.5–1.3)
GLUCOSE SERPL-MCNC: 90 MG/DL — SIGNIFICANT CHANGE UP (ref 70–99)
HCT VFR BLD CALC: 34.9 % — LOW (ref 39–50)
HGB BLD-MCNC: 10.7 G/DL — LOW (ref 13–17)
INR BLD: 1.72 RATIO — HIGH (ref 0.88–1.16)
MCHC RBC-ENTMCNC: 30.7 GM/DL — LOW (ref 32–36)
MCHC RBC-ENTMCNC: 31.6 PG — SIGNIFICANT CHANGE UP (ref 27–34)
MCV RBC AUTO: 102.9 FL — HIGH (ref 80–100)
NRBC # BLD: 0 /100 WBCS — SIGNIFICANT CHANGE UP (ref 0–0)
PHOSPHATE SERPL-MCNC: 4.1 MG/DL — SIGNIFICANT CHANGE UP (ref 2.5–4.5)
PLATELET # BLD AUTO: 361 K/UL — SIGNIFICANT CHANGE UP (ref 150–400)
POTASSIUM SERPL-MCNC: 4.3 MMOL/L — SIGNIFICANT CHANGE UP (ref 3.5–5.3)
POTASSIUM SERPL-SCNC: 4.3 MMOL/L — SIGNIFICANT CHANGE UP (ref 3.5–5.3)
PROTHROM AB SERPL-ACNC: 19.4 SEC — HIGH (ref 10–12.9)
RBC # BLD: 3.39 M/UL — LOW (ref 4.2–5.8)
RBC # FLD: 22.1 % — HIGH (ref 10.3–14.5)
SODIUM SERPL-SCNC: 137 MMOL/L — SIGNIFICANT CHANGE UP (ref 135–145)
WBC # BLD: 20.09 K/UL — HIGH (ref 3.8–10.5)
WBC # FLD AUTO: 20.09 K/UL — HIGH (ref 3.8–10.5)

## 2019-05-06 PROCEDURE — 88104 CYTOPATH FL NONGYN SMEARS: CPT | Mod: 26

## 2019-05-06 PROCEDURE — 93010 ELECTROCARDIOGRAM REPORT: CPT

## 2019-05-06 PROCEDURE — 99233 SBSQ HOSP IP/OBS HIGH 50: CPT

## 2019-05-06 PROCEDURE — 31625 BRONCHOSCOPY W/BIOPSY(S): CPT

## 2019-05-06 PROCEDURE — 31624 DX BRONCHOSCOPE/LAVAGE: CPT

## 2019-05-06 PROCEDURE — 88305 TISSUE EXAM BY PATHOLOGIST: CPT | Mod: 26

## 2019-05-06 RX ORDER — GABAPENTIN 400 MG/1
300 CAPSULE ORAL DAILY
Qty: 0 | Refills: 0 | Status: DISCONTINUED | OUTPATIENT
Start: 2019-05-06 | End: 2019-05-15

## 2019-05-06 RX ORDER — FINASTERIDE 5 MG/1
5 TABLET, FILM COATED ORAL DAILY
Qty: 0 | Refills: 0 | Status: DISCONTINUED | OUTPATIENT
Start: 2019-05-06 | End: 2019-05-15

## 2019-05-06 RX ORDER — FENTANYL CITRATE 50 UG/ML
25 INJECTION INTRAVENOUS
Qty: 0 | Refills: 0 | Status: DISCONTINUED | OUTPATIENT
Start: 2019-05-06 | End: 2019-05-07

## 2019-05-06 RX ORDER — ACETAMINOPHEN 500 MG
650 TABLET ORAL EVERY 6 HOURS
Qty: 0 | Refills: 0 | Status: DISCONTINUED | OUTPATIENT
Start: 2019-05-06 | End: 2019-05-15

## 2019-05-06 RX ORDER — ACETAMINOPHEN 500 MG
1000 TABLET ORAL ONCE
Qty: 0 | Refills: 0 | Status: COMPLETED | OUTPATIENT
Start: 2019-05-06 | End: 2019-05-06

## 2019-05-06 RX ORDER — DILTIAZEM HCL 120 MG
60 CAPSULE, EXT RELEASE 24 HR ORAL
Qty: 0 | Refills: 0 | Status: DISCONTINUED | OUTPATIENT
Start: 2019-05-06 | End: 2019-05-11

## 2019-05-06 RX ORDER — HYDROCORTISONE 20 MG
100 TABLET ORAL EVERY 8 HOURS
Qty: 0 | Refills: 0 | Status: DISCONTINUED | OUTPATIENT
Start: 2019-05-06 | End: 2019-05-07

## 2019-05-06 RX ORDER — HYDROCORTISONE 20 MG
100 TABLET ORAL ONCE
Qty: 0 | Refills: 0 | Status: DISCONTINUED | OUTPATIENT
Start: 2019-05-06 | End: 2019-05-06

## 2019-05-06 RX ORDER — SODIUM CHLORIDE 9 MG/ML
1000 INJECTION INTRAMUSCULAR; INTRAVENOUS; SUBCUTANEOUS
Qty: 0 | Refills: 0 | Status: DISCONTINUED | OUTPATIENT
Start: 2019-05-06 | End: 2019-05-07

## 2019-05-06 RX ORDER — LIDOCAINE AND PRILOCAINE CREAM 25; 25 MG/G; MG/G
1 CREAM TOPICAL DAILY
Qty: 0 | Refills: 0 | Status: DISCONTINUED | OUTPATIENT
Start: 2019-05-06 | End: 2019-05-15

## 2019-05-06 RX ORDER — OXYCODONE AND ACETAMINOPHEN 5; 325 MG/1; MG/1
1 TABLET ORAL EVERY 4 HOURS
Qty: 0 | Refills: 0 | Status: DISCONTINUED | OUTPATIENT
Start: 2019-05-06 | End: 2019-05-13

## 2019-05-06 RX ORDER — DOXERCALCIFEROL 2.5 UG/1
1 CAPSULE ORAL
Qty: 0 | Refills: 0 | Status: DISCONTINUED | OUTPATIENT
Start: 2019-05-06 | End: 2019-05-15

## 2019-05-06 RX ORDER — PANTOPRAZOLE SODIUM 20 MG/1
40 TABLET, DELAYED RELEASE ORAL
Qty: 0 | Refills: 0 | Status: DISCONTINUED | OUTPATIENT
Start: 2019-05-06 | End: 2019-05-08

## 2019-05-06 RX ORDER — ACETAMINOPHEN 500 MG
650 TABLET ORAL EVERY 6 HOURS
Qty: 0 | Refills: 0 | Status: DISCONTINUED | OUTPATIENT
Start: 2019-05-06 | End: 2019-05-08

## 2019-05-06 RX ORDER — ALLOPURINOL 300 MG
100 TABLET ORAL
Qty: 0 | Refills: 0 | Status: DISCONTINUED | OUTPATIENT
Start: 2019-05-06 | End: 2019-05-15

## 2019-05-06 RX ORDER — HYDROCORTISONE 20 MG
100 TABLET ORAL EVERY 8 HOURS
Qty: 0 | Refills: 0 | Status: DISCONTINUED | OUTPATIENT
Start: 2019-05-06 | End: 2019-05-06

## 2019-05-06 RX ORDER — ONDANSETRON 8 MG/1
4 TABLET, FILM COATED ORAL ONCE
Qty: 0 | Refills: 0 | Status: DISCONTINUED | OUTPATIENT
Start: 2019-05-06 | End: 2019-05-07

## 2019-05-06 RX ORDER — ALBUTEROL 90 UG/1
2 AEROSOL, METERED ORAL EVERY 6 HOURS
Qty: 0 | Refills: 0 | Status: DISCONTINUED | OUTPATIENT
Start: 2019-05-06 | End: 2019-05-15

## 2019-05-06 RX ORDER — CHOLESTYRAMINE 4 G/9G
4 POWDER, FOR SUSPENSION ORAL DAILY
Qty: 0 | Refills: 0 | Status: DISCONTINUED | OUTPATIENT
Start: 2019-05-06 | End: 2019-05-15

## 2019-05-06 RX ORDER — FENTANYL CITRATE 50 UG/ML
50 INJECTION INTRAVENOUS
Qty: 0 | Refills: 0 | Status: DISCONTINUED | OUTPATIENT
Start: 2019-05-06 | End: 2019-05-07

## 2019-05-06 RX ORDER — SEVELAMER CARBONATE 2400 MG/1
800 POWDER, FOR SUSPENSION ORAL
Qty: 0 | Refills: 0 | Status: DISCONTINUED | OUTPATIENT
Start: 2019-05-06 | End: 2019-05-10

## 2019-05-06 RX ORDER — ERYTHROPOIETIN 10000 [IU]/ML
10000 INJECTION, SOLUTION INTRAVENOUS; SUBCUTANEOUS
Qty: 0 | Refills: 0 | Status: DISCONTINUED | OUTPATIENT
Start: 2019-05-06 | End: 2019-05-15

## 2019-05-06 RX ORDER — ATORVASTATIN CALCIUM 80 MG/1
20 TABLET, FILM COATED ORAL AT BEDTIME
Qty: 0 | Refills: 0 | Status: DISCONTINUED | OUTPATIENT
Start: 2019-05-06 | End: 2019-05-15

## 2019-05-06 RX ORDER — SEVELAMER CARBONATE 2400 MG/1
800 POWDER, FOR SUSPENSION ORAL
Qty: 0 | Refills: 0 | Status: DISCONTINUED | OUTPATIENT
Start: 2019-05-06 | End: 2019-05-06

## 2019-05-06 RX ADMIN — ERYTHROPOIETIN 10000 UNIT(S): 10000 INJECTION, SOLUTION INTRAVENOUS; SUBCUTANEOUS at 10:36

## 2019-05-06 RX ADMIN — LIDOCAINE AND PRILOCAINE CREAM 1 APPLICATION(S): 25; 25 CREAM TOPICAL at 06:16

## 2019-05-06 RX ADMIN — Medication 60 MILLIGRAM(S): at 22:57

## 2019-05-06 RX ADMIN — Medication 1 DROP(S): at 06:16

## 2019-05-06 RX ADMIN — Medication 100 MILLIGRAM(S): at 22:57

## 2019-05-06 RX ADMIN — OXYCODONE AND ACETAMINOPHEN 1 TABLET(S): 5; 325 TABLET ORAL at 16:49

## 2019-05-06 RX ADMIN — Medication 60 MILLIGRAM(S): at 16:50

## 2019-05-06 RX ADMIN — Medication 400 MILLIGRAM(S): at 21:10

## 2019-05-06 RX ADMIN — ATORVASTATIN CALCIUM 20 MILLIGRAM(S): 80 TABLET, FILM COATED ORAL at 22:57

## 2019-05-06 RX ADMIN — SODIUM CHLORIDE 3 MILLILITER(S): 9 INJECTION INTRAMUSCULAR; INTRAVENOUS; SUBCUTANEOUS at 05:34

## 2019-05-06 RX ADMIN — DOXERCALCIFEROL 1 MICROGRAM(S): 2.5 CAPSULE ORAL at 10:35

## 2019-05-06 RX ADMIN — Medication 650 MILLIGRAM(S): at 13:18

## 2019-05-06 RX ADMIN — Medication 100 MILLIGRAM(S): at 17:33

## 2019-05-06 RX ADMIN — OXYCODONE AND ACETAMINOPHEN 1 TABLET(S): 5; 325 TABLET ORAL at 06:20

## 2019-05-06 RX ADMIN — Medication 60 MILLIGRAM(S): at 12:57

## 2019-05-06 RX ADMIN — SODIUM CHLORIDE 3 MILLILITER(S): 9 INJECTION INTRAMUSCULAR; INTRAVENOUS; SUBCUTANEOUS at 15:29

## 2019-05-06 RX ADMIN — Medication 1000 MILLIGRAM(S): at 22:15

## 2019-05-06 NOTE — BRIEF OPERATIVE NOTE - NSICDXBRIEFPREOP_GEN_ALL_CORE_FT
PRE-OP DIAGNOSIS:  Hemoptysis 06-May-2019 21:17:40  Cherise Pierre
PRE-OP DIAGNOSIS:  PAD (peripheral artery disease) 23-Apr-2019 17:21:18 status post L femoral endarterectomy Jason Clement

## 2019-05-06 NOTE — ADVANCED PRACTICE NURSE CONSULT - ASSESSMENT
This is an 84 year old male that was admitted to the hospital on/ / for fever/surgical site infection.  Requested by MD to assess patient's left groin wound. Patient's wound being followed by Dr. Arreaga with wet to dry dressings.  Wound care per Dr. Arreaga.

## 2019-05-06 NOTE — PROGRESS NOTE ADULT - ASSESSMENT
83 y/o male with ESRD on HD (MWF), HTN, RA on MTX and steroids, R leg amputation, severe CDI and megacolon, Severe PAD s/p LLE endarterectomy (4/1/19)with dr Clement presented with fever (100.5) and DC from L groin. Pt was noted to have lung mass increased in size.     #Surgical site infection s/p endarterectomy 4/1   - Continue daily dressing as recommended by plastic surgery and wound care team.   - Continue pain control with tylenol PRN, tramadol PRN and percocet PRN.   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 Give 2 additional doses q 8hrs after.   - CT scan noted R endobronchial mass enlarging from previous CT and new L lung mass  - Pulm, CT surgery consult appreciated    #Thyroid Nodule suspicion for malignancy  - Nodule noted on US  - Repeat US in 6 months outpatient. 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 Clement  - KUSH venous doppler - no evidence of DVT; markedly increased velocity in AV fistula of arm raising suspicion of hemodynamically significant stenosis  - Vascular surgery recs appreciated.       #Recurrent C. diff diarrhea with megacolon  - Monitor off abx  - ID recs appreciated      #PAF    - Tachy in the low 100s  - Continue cardizem  - Coumadin on hold for bronchoscopy. INR <2      #ESRD on HD M,W,F  -continue dialysis  -Nephro recs appreciated.       #Chronic diastolic CHF   - Last echo 2018 EF of 60-65%.       #PVD  - Coumadin on hold for bronchoscopy.    #Prophylactic Measure  - Coumadin on hold for bronchoscopy.

## 2019-05-06 NOTE — PROGRESS NOTE ADULT - SUBJECTIVE AND OBJECTIVE BOX
No complaints  Afebrile  L groin wound clean.  Viable muscle flap in base of wound.   Continue BID wet to dry dressing changes.

## 2019-05-06 NOTE — PROGRESS NOTE ADULT - SUBJECTIVE AND OBJECTIVE BOX
Patient is a 84y old  Male who presents with a chief complaint of L groin DC (28 Apr 2019 17:14)      HPI:  85 y/o male with ESRD on HD (MWF), HTN, RA on MTX and steroids, R leg amputation, severe CDI and megacolon, Severe PAD s/p LLE endarterectomy 2 weeks ago with dr dugan presents with T 100 and DC from L groin.  Pt also c/o R arm pain/swelling where pt had fistulogram of the AC fistula on last admission.  RUL dopplers negative for DVT.  L groin sono--negative for pseudo aneurysm and + hematoma.  Pt given 2.1 L IVF, IV vanco/aztreonam in ED  On my exam in HD suite, awake, chronically ill appearing, NAD, daughter at bedside. Pt seen by Dr dugan in ED.  CXR--clear    TTE (10/18)--mild-mod MR/EF 60%    Pt d/w daughter regarding advance directives--Pt is FULL RESUSCITATION (19 Apr 2019 16:12)  events noted while admitted muscle flap and plastic surgery care developed increased cough with possible bloody streaked sputuim / none present now and RN at bedside, both noticed thhick yellow sputum production    4/30  data discussed with RN at bedside  no cp  improved breathing    5/1  feels the same  decreased cough  DTR is contacted and data discussed regarding abnormal ct scan findings  5/2  data discussed with pt's DTR and medical team as well  although BRONCH is appropriate given ct scan findings, it can not be done yet till INR corrected specially in pt with ESRD.  DATA DISCUSSED WITH DR JORGE MASSEY AS WELL for thoracic surgery consult  5/3  data discussed with thoracic surgery yesterday  discussed with dr Rashid today  pt is resting comfortably  no resp distress  no hemoptysis    5/4  No acute events occurred overnight  Discussed with hospitalist/resident team and daughter     5/5  INR still elevated   No acute pulmonary events occurred overnight  5/6 uneventful night  in dialysis  bronch postponed till INR corrected    MEDICATIONS  (STANDING):  allopurinol 100 milliGRAM(s) Oral <User Schedule>  artificial tears (preservative free) Ophthalmic Solution 1 Drop(s) Both EYES three times a day  atorvastatin 20 milliGRAM(s) Oral at bedtime  cholestyramine Powder (Sugar-Free) 4 Gram(s) Oral daily  diltiazem    Tablet 60 milliGRAM(s) Oral four times a day  doxercalciferol Injectable 1 MICROGram(s) IV Push <User Schedule>  epoetin nelly Injectable 92128 Unit(s) IV Push <User Schedule>  finasteride 5 milliGRAM(s) Oral daily  gabapentin 300 milliGRAM(s) Oral daily  lidocaine/prilocaine Cream 1 Application(s) Topical daily  pantoprazole    Tablet 40 milliGRAM(s) Oral before breakfast  predniSONE   Tablet 2.5 milliGRAM(s) Oral every other day  predniSONE   Tablet 5 milliGRAM(s) Oral every other day  sevelamer hydrochloride Oral Tab/Cap - Peds 800 milliGRAM(s) Oral three times a day with meals  silver sulfADIAZINE 1% Cream 1 Application(s) Topical daily  sodium chloride 0.9% lock flush 3 milliLiter(s) IV Push every 8 hours    MEDICATIONS  (PRN):  acetaminophen   Tablet .. 650 milliGRAM(s) Oral every 6 hours PRN Temp greater or equal to 38.5C (101.3F)  acetaminophen   Tablet .. 650 milliGRAM(s) Oral every 6 hours PRN Mild Pain (1 - 3)  ALBUTerol    90 MICROgram(s) HFA Inhaler 2 Puff(s) Inhalation every 6 hours PRN Shortness of Breath and/or Wheezing  guaiFENesin   Syrup  (Sugar-Free) 100 milliGRAM(s) Oral every 6 hours PRN Cough  oxyCODONE    5 mG/acetaminophen 325 mG 1 Tablet(s) Oral every 4 hours PRN Moderate Pain (4 - 6)    Vital Signs Last 24 Hrs  T(C): 36.6 (06 May 2019 08:01), Max: 36.8 (05 May 2019 17:58)  T(F): 97.8 (06 May 2019 08:01), Max: 98.3 (05 May 2019 17:58)  HR: 88 (06 May 2019 08:40) (71 - 88)  BP: 160/85 (06 May 2019 08:40) (126/56 - 160/85)  BP(mean): --  RR: 16 (06 May 2019 08:40) (14 - 18)  SpO2: 95% (06 May 2019 05:35) (95% - 100%)    PHYSICAL EXAM    HEENT: PERRL, conjunctiva clear, EOM's intact, non injected pharynx, no exudate, TM   normal  Neck: Supple, , no adenopathy, thyroid: not enlarged, no carotid bruit or JVD  Back: Symmetric, no  tenderness,no soft tissue tenderness  Lungs: Clear to auscultation bilateral,no adventitious breath sounds, normal   expiratory phase  Heart: Regular rate and rhythm, S1, S2 normal, no murmur, rub or gallop  Abdomen: Soft, non-tender, bowel sounds active , no hepatosplenomegaly  Extremities: no cyanosis or edema, no joint swelling, hx AKA right side  Skin: Skin color, texture normal, no rashes   Neurologic: Alert and oriented X3 , cranial nerves intact, sensory and motor normal,    ECG:    LABS:                                 10.7   20.09 )-----------( 361      ( 06 May 2019 07:45 )             34.9   05-06    137  |  105  |  48<H>  ----------------------------<  90  4.3   |  23  |  4.78<H>    Ca    8.1<L>      06 May 2019 07:45  Phos  4.1     05-06    TPro  x   /  Alb  2.0<L>  /  TBili  x   /  DBili  x   /  AST  x   /  ALT  x   /  AlkPhos  x   05-06               9.8    16.81 )-----------( 443      ( 01 May 2019 06:35 )             32.1   PT/INR - ( 06 May 2019 07:45 )   PT: 19.4 sec;   INR: 1.72 ratio                                 9.9    13.16 )-----------( 358      ( 30 Apr 2019 06:08 )             32.1                           10.1   7.96  )-----------( 334      ( 29 Apr 2019 07:08 )             33.2     04-29    04-30    142  |  111<H>  |  19  ----------------------------<  87  3.8   |  24  |  2.70<H>    Ca    8.3<L>      30 Apr 2019 06:08  Phos  3.4     04-29    TPro  x   /  Alb  1.8<L>  /  TBili  x   /  DBili  x   /  AST  x   /  ALT  x   /  AlkPhos  x   04-29  138  |  108  |  35<H>  ----------------------------<  95  4.4   |  22  |  4.61<H>    Ca    8.0<L>      29 Apr 2019 07:08              PT/INR - ( 29 Apr 2019 07:08 )   PT: 39.9 sec;   INR: 3.46 ratio                   RADIOLOGY & ADDITIONAL STUDIES:  < from: CT Chest No Cont (04.28.19 @ 12:17) >  PROCEDURE:   CT of the Chest was performed without intravenous contrast.  Sagittal and coronal reformats were performed.    FINDINGS:    LUNGS AND LARGE AIRWAYS: Emphysema. Continued increase size of tubular   oblong opacity in the right middle lobe, possibly endobronchial mass with   impacted airways. Possible associated broncholiths. Irregular opacity in   the posterior left upper lobe (series 3, image 60), measuring 1.4 cm.  PLEURA: Small left pleural effusion. Trace right pleural effusion.  VESSELS: Atherosclerotic calcifications.   HEART: Heart size is normal. No pericardial effusion.  MEDIASTINUM AND [place]: No lymphadenopathy.  CHEST WALL AND LOWER NECK: Left thyroid nodule, measuring 2.9 cm.   Extension of the thyroid gland into the superior mediastinum.   VISUALIZED UPPER ABDOMEN: Cholelithiasis. Unchanged probable cyst in   hepatic segment 8.  BONES: Degenerative changes of the spine.    IMPRESSION:   Continued increase size of tubular oblong opacity in the right middle   lobe, possibly endobronchial mass with impacted airways. Irregular   opacity in the posterior left upper lobe, measuring 1.4 cm. PET/CT is   recommended for further evaluation.    Left thyroid nodule, measuring 2.9 cm. Further evaluation with thyroid   ultrasound is recommended.    < end of copied text >

## 2019-05-06 NOTE — BRIEF OPERATIVE NOTE - OPERATION/FINDINGS
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 Biopsies were difficult to obtain due to location and firm rubbery consistency of the mass. Brushings, BAL, and biopsy sent for cytology/path. BAL sample was sent for microbiology.
bovine pericardial patch intact; minimally necrotic SQ tissue and skin

## 2019-05-06 NOTE — PROGRESS NOTE ADULT - SUBJECTIVE AND OBJECTIVE BOX
NEPHROLOGY INTERVAL HPI/OVERNIGHT EVENTS:  19 @ 09:58  seen at hd   was for possible bronch this am ?cancelled   HR variable with cardizem that was held  no diarrhea, no abd pain       MEDICATIONS  (STANDING):  allopurinol 100 milliGRAM(s) Oral <User Schedule>  artificial tears (preservative free) Ophthalmic Solution 1 Drop(s) Both EYES three times a day  atorvastatin 20 milliGRAM(s) Oral at bedtime  cholestyramine Powder (Sugar-Free) 4 Gram(s) Oral daily  diltiazem    Tablet 60 milliGRAM(s) Oral four times a day  doxercalciferol Injectable 1 MICROGram(s) IV Push <User Schedule>  epoetin nelly Injectable 32064 Unit(s) IV Push <User Schedule>  finasteride 5 milliGRAM(s) Oral daily  gabapentin 300 milliGRAM(s) Oral daily  lidocaine/prilocaine Cream 1 Application(s) Topical daily  pantoprazole    Tablet 40 milliGRAM(s) Oral before breakfast  predniSONE   Tablet 2.5 milliGRAM(s) Oral every other day  predniSONE   Tablet 5 milliGRAM(s) Oral every other day  sevelamer hydrochloride Oral Tab/Cap - Peds 800 milliGRAM(s) Oral three times a day with meals  silver sulfADIAZINE 1% Cream 1 Application(s) Topical daily  sodium chloride 0.9% lock flush 3 milliLiter(s) IV Push every 8 hours    MEDICATIONS  (PRN):  acetaminophen   Tablet .. 650 milliGRAM(s) Oral every 6 hours PRN Temp greater or equal to 38.5C (101.3F)  acetaminophen   Tablet .. 650 milliGRAM(s) Oral every 6 hours PRN Mild Pain (1 - 3)  ALBUTerol    90 MICROgram(s) HFA Inhaler 2 Puff(s) Inhalation every 6 hours PRN Shortness of Breath and/or Wheezing  guaiFENesin   Syrup  (Sugar-Free) 100 milliGRAM(s) Oral every 6 hours PRN Cough  oxyCODONE    5 mG/acetaminophen 325 mG 1 Tablet(s) Oral every 4 hours PRN Moderate Pain (4 - 6)      Allergies    Zosyn (Rash)    Intolerances      Patient was seen and evaluated on dialysis.   Patient is tolerating the procedure well.   Continue dialysis:   Dialyzer:   revaclear 300 on 2k with uf goal of 1.2 kg   BFR of 350       Vital Signs Last 24 Hrs  T(C): 36.6 (06 May 2019 08:01), Max: 36.8 (05 May 2019 17:58)  T(F): 97.8 (06 May 2019 08:01), Max: 98.3 (05 May 2019 17:58)  HR: 84 (06 May 2019 09:51) (71 - 120)  BP: 155/73 (06 May 2019 09:51) (126/56 - 160/85)  BP(mean): --  RR: 15 (06 May 2019 09:51) (14 - 18)  SpO2: 94% (06 May 2019 09:51) (94% - 100%)  Daily     Daily Weight in k (06 May 2019 05:35)    PHYSICAL EXAM:  General: alert. awake Ox3  HEENT: MMM  CV: s1s2 rrr  LUNGS: B/L ronchi  EXT: no edema    LABS:                        10.7   20.09 )-----------( 361      ( 06 May 2019 07:45 )             34.9         137  |  105  |  48<H>  ----------------------------<  90  4.3   |  23  |  4.78<H>    Ca    8.1<L>      06 May 2019 07:45  Phos  4.1         TPro  x   /  Alb  2.0<L>  /  TBili  x   /  DBili  x   /  AST  x   /  ALT  x   /  AlkPhos  x       PT/INR - ( 06 May 2019 07:45 )   PT: 19.4 sec;   INR: 1.72 ratio             Phosphorus Level, Serum: 4.1 mg/dL ( @ 07:45)

## 2019-05-06 NOTE — BRIEF OPERATIVE NOTE - NSICDXBRIEFPROCEDURE_GEN_ALL_CORE_FT
PROCEDURES:  Bronchoscopy, adult 06-May-2019 21:17:25  Cherise Pierre
PROCEDURES:  Debridement of open wound, 20 sq cm or less 23-Apr-2019 17:20:55 Jason Sanderson

## 2019-05-06 NOTE — PROGRESS NOTE ADULT - SUBJECTIVE AND OBJECTIVE BOX
Subjective:  Patient seen on HD.  No new complaints.  Scheduled for bronchoscopy today, emergent case now in OR at [facility], patient made aware case may have to be cancelled or performed late tonight dependent on surgeon availability.    Vital Signs:  Vital Signs Last 24 Hrs  T(F): 97.8, Max: 98.3   HR: 95   BP: 141/105  RR: 16   SpO2: 92% on room air      Relevant labs, radiology and Medications reviewed                        10.7   20.09 )-----------( 361      ( 06 May 2019 07:45 )             34.9     05-06    137  |  105  |  48<H>  ----------------------------<  90  4.3   |  23  |  4.78<H>    Ca    8.1<L>      06 May 2019 07:45  Phos  4.1     05-06    TPro  x   /  Alb  2.0<L>  /  TBili  x   /  DBili  x   /  AST  x   /  ALT  x   /  AlkPhos  x   05-06    PT/INR - ( 06 May 2019 07:45 )   PT: 19.4 sec;   INR: 1.72 ratio           MEDICATIONS  (STANDING):  allopurinol 100 milliGRAM(s) Oral <User Schedule>  artificial tears (preservative free) Ophthalmic Solution 1 Drop(s) Both EYES three times a day  atorvastatin 20 milliGRAM(s) Oral at bedtime  cholestyramine Powder (Sugar-Free) 4 Gram(s) Oral daily  diltiazem    Tablet 60 milliGRAM(s) Oral four times a day  doxercalciferol Injectable 1 MICROGram(s) IV Push <User Schedule>  epoetin nelly Injectable 93994 Unit(s) IV Push <User Schedule>  finasteride 5 milliGRAM(s) Oral daily  gabapentin 300 milliGRAM(s) Oral daily  lidocaine/prilocaine Cream 1 Application(s) Topical daily  pantoprazole    Tablet 40 milliGRAM(s) Oral before breakfast  predniSONE   Tablet 2.5 milliGRAM(s) Oral every other day  predniSONE   Tablet 5 milliGRAM(s) Oral every other day  sevelamer hydrochloride Oral Tab/Cap - Peds 800 milliGRAM(s) Oral three times a day with meals  silver sulfADIAZINE 1% Cream 1 Application(s) Topical daily  sodium chloride 0.9% lock flush 3 milliLiter(s) IV Push every 8 hours    MEDICATIONS  (PRN):  acetaminophen   Tablet .. 650 milliGRAM(s) Oral every 6 hours PRN Temp greater or equal to 38.5C (101.3F)  acetaminophen   Tablet .. 650 milliGRAM(s) Oral every 6 hours PRN Mild Pain (1 - 3)  ALBUTerol    90 MICROgram(s) HFA Inhaler 2 Puff(s) Inhalation every 6 hours PRN Shortness of Breath and/or Wheezing  guaiFENesin   Syrup  (Sugar-Free) 100 milliGRAM(s) Oral every 6 hours PRN Cough  oxyCODONE    5 mG/acetaminophen 325 mG 1 Tablet(s) Oral every 4 hours PRN Moderate Pain (4 - 6)      Physical exam  Gen: NAD, breathing comfortably.  Neuro: AO x 3.  Card: S1 S2.  Pulm: CTA bilaterally.  Abd: Soft NT ND.  Ext: Bilateral amputations - left leg incision c/d/i.      Assessment  84y Male  w/ PAST MEDICAL & SURGICAL HISTORY:  Above knee amputation of right lower extremity  Recurrent Clostridium difficile diarrhea  Diastolic CHF  Peripheral vascular disease  Afib  Anemia  CKD (chronic kidney disease)  COPD (chronic obstructive pulmonary disease)  SHAYY (obstructive sleep apnea)  Sepsis, due to unspecified organism: 2/2 poorly healing wounds b/l  Dyspepsia: On moderate exertion.  Sleep apnea, obstructive: Requires home 02 therapy, and treatment with BIPAP  Atelectasis  Pleural effusion, bilateral  Respiratory failure  Peripheral edema  CRI (chronic renal insufficiency)  Gout  Benign prostatic hypertrophy  Spinal stenosis  Hypercholesterolemia  GERD (gastroesophageal reflux disease)  CAD (coronary artery disease)  Hypertension  S/P angioplasty with stent  Cataract of left eye  Prostate: Surgery green light procedure.  S/P rotator cuff surgery: Right  S/P angioplasty  admitted with complaints of L groin DC (06 May 2019 09:58)  .  patient underwent Debridement of open wound, 20 sq cm or less      PLAN  NPO except for meds with sips of water.  Plan for bronchoscopy later today possibly tomorrow.  Continue to hold all anticoagulation.

## 2019-05-06 NOTE — PROGRESS NOTE ADULT - ASSESSMENT
83 y/o male with ESRD on HD (MWF), HTN, RA on MTX and steroids, R leg amputation, severe CDI and megacolon, Severe PAD s/p LLE endarterectomy 2 weeks ago with dr dugan presents with T 100 and DC from L groin.  Pt also c/o R arm pain/swelling where pt had fistulogram of the AC fistula on last admission.  RUL dopplers negative for DVT.  L groin sono--negative for pseudo aneurysm and + hematoma.  Pt given 2.1 L IVF, IV vanco/aztreonam in ED  On my exam in HD suite, awake, chronically ill appearing, NAD, daughter at bedside. Pt seen by Dr dugan in ED.  CXR--clear    4/20  s/p hd yesterday  prolonged bleed from access stopped w pressure  feels well  arm less tender  received 1 u prbc on hd yesterday  monitor cbc    4/22 SY  --ESRD : HD order and tx reviewed.   Tolerating tx well.  --AVF : as above.  Prolonged bleeding post tx.  Venous pressure acceptable today.  Monitor AVF function.  --Anemia : with acute loss.  Active infection leading to LETICIA hyporesponsiveness.  Transfuse 2 units today.  --PAD : post Left Ileofemoral Endarterectomy : Monitor Left foot perfusion.  --ID ; Left Groin infection : Continue abtx.  For debridement in am.    4/23  as above  For HD Wednesday  groin wash out today    4/24 SY  --ESRD : HD order and tx reviewed.  Tolerating tx well.  AVF cannulated without difficulty.  --Left Groin wound --Post  Muscle Flap Coverage.  Continue abtx.  --PAD : monitor perfusion.  --ID : continue Cefepime.    4/25 SY  --ESRD : Continue TIW HD  --AVF : cannulated without difficulty.  RUE edema much improved.  --Left Groin wound : post Muscle Flap Coverage.   Continue abtx.  --ID: continue abtx.  --PAD : post Left Ileofemoral endarterectomy : monitor perfusion.    4/26  seen on hd  in good spirits   stable on hd  fluid removal reduced due to low BP  hgb stable    4/27 SY  --ESRD : Continue TIW HD  --AVF : functioning well.  --PAD : Post Left Ileofemoral Endarterectomy : monitor perfusion  --Left Groin Wound : post Muscle Flap Coverage : continue abtx and wound care.    4/28 SY  --ESRD : for HD in am  --AVF : functioning well with improved cannulation.  --PAD : monitor perfusion ( Post Left Ileofemoral Endarterectomy)  --Left Groin wound : post Muscle Flap : continue abtx and wound care.    4/29 MK   - ESRD: tolerting hd, AVF cannultion well  - inc sputum production with possible endobronchial mass: dr bush input noted, may need bronch  - AMS with more confusion and jerking motion: pain meds have been limited, will get ammonia and abg value, has not been using bipap during hospitalization  - [place]: fu h/h   - left groin wound s/p muscle flap coverage with s/p left ileofemoral endarterectomy      4/30 SY  --ESRD : Continue TIW HD  --Pulm : Hemoptysis resolved.  New RML PNA and FERNANDO nodule   improved resp status . Now PNA with mucus plugging and new findings of lesion.  For outpt work up once clinically improved.  --AMS ;resolved and at baseline.  Attempt to minimize pain meds.  --PAD : Monitor Left groin and LE wound.  --ID : continue Cefepime and Daptomycin with po vanco for C diff prophylaxis.    5/1 SY  --ESRD :HD order and tx reviewed.    --Pulm : New RML PNA and FERNANDO nodule.  Pulmonary follow up appreciated.  Continue ABTX.  --AMS : resolved and stable.  --Increasing Leukocytosis : continue abtx.  D/w Dr Flanagan.  --PAD : LLE pain improved. (post Left Ileofemoral endarterectomy last admission.    5/2  Some av access arm pain last hd improved w repositioning  stable vitals  pulmonary/ thoracic surgery input noted  d/w Pt daughter    5/3  arm feels better today  for dialysis later  sacral evaluation for possible intervention    5/4 MK   - ESRD on hd: tolerating uf with hd   - Afib on ac: cardizem and titrate as needed  - Anemia: epo   - PNA with mucus plug and possible endobronchial lesion, with rising leukocytosis await timing of bronch  - rising leukocytosis with hx of cdiff: no diarrhea   LM with dr bush 85 y/o male with ESRD on HD (MWF), HTN, RA on MTX and steroids, R leg amputation, severe CDI and megacolon, Severe PAD s/p LLE endarterectomy 2 weeks ago with dr dugan presents with T 100 and DC from L groin.  Pt also c/o R arm pain/swelling where pt had fistulogram of the AC fistula on last admission.  RUL dopplers negative for DVT.  L groin sono--negative for pseudo aneurysm and + hematoma.  Pt given 2.1 L IVF, IV vanco/aztreonam in ED  On my exam in HD suite, awake, chronically ill appearing, NAD, daughter at bedside. Pt seen by Dr dugan in ED.  CXR--clear    4/20  s/p hd yesterday  prolonged bleed from access stopped w pressure  feels well  arm less tender  received 1 u prbc on hd yesterday  monitor cbc    4/22 SY  --ESRD : HD order and tx reviewed.   Tolerating tx well.  --AVF : as above.  Prolonged bleeding post tx.  Venous pressure acceptable today.  Monitor AVF function.  --Anemia : with acute loss.  Active infection leading to LETICIA hyporesponsiveness.  Transfuse 2 units today.  --PAD : post Left Ileofemoral Endarterectomy : Monitor Left foot perfusion.  --ID ; Left Groin infection : Continue abtx.  For debridement in am.    4/23  as above  For HD Wednesday  groin wash out today    4/24 SY  --ESRD : HD order and tx reviewed.  Tolerating tx well.  AVF cannulated without difficulty.  --Left Groin wound --Post  Muscle Flap Coverage.  Continue abtx.  --PAD : monitor perfusion.  --ID : continue Cefepime.    4/25 SY  --ESRD : Continue TIW HD  --AVF : cannulated without difficulty.  RUE edema much improved.  --Left Groin wound : post Muscle Flap Coverage.   Continue abtx.  --ID: continue abtx.  --PAD : post Left Ileofemoral endarterectomy : monitor perfusion.    4/26  seen on hd  in good spirits   stable on hd  fluid removal reduced due to low BP  hgb stable    4/27 SY  --ESRD : Continue TIW HD  --AVF : functioning well.  --PAD : Post Left Ileofemoral Endarterectomy : monitor perfusion  --Left Groin Wound : post Muscle Flap Coverage : continue abtx and wound care.    4/28 SY  --ESRD : for HD in am  --AVF : functioning well with improved cannulation.  --PAD : monitor perfusion ( Post Left Ileofemoral Endarterectomy)  --Left Groin wound : post Muscle Flap : continue abtx and wound care.    4/29 MK   - ESRD: tolerting hd, AVF cannultion well  - inc sputum production with possible endobronchial mass: dr bush input noted, may need bronch  - AMS with more confusion and jerking motion: pain meds have been limited, will get ammonia and abg value, has not been using bipap during hospitalization  - [place]: fu h/h   - left groin wound s/p muscle flap coverage with s/p left ileofemoral endarterectomy      4/30 SY  --ESRD : Continue TIW HD  --Pulm : Hemoptysis resolved.  New RML PNA and FERNANDO nodule   improved resp status . Now PNA with mucus plugging and new findings of lesion.  For outpt work up once clinically improved.  --AMS ;resolved and at baseline.  Attempt to minimize pain meds.  --PAD : Monitor Left groin and LE wound.  --ID : continue Cefepime and Daptomycin with po vanco for C diff prophylaxis.    5/1 SY  --ESRD :HD order and tx reviewed.    --Pulm : New RML PNA and FERNANDO nodule.  Pulmonary follow up appreciated.  Continue ABTX.  --AMS : resolved and stable.  --Increasing Leukocytosis : continue abtx.  D/w Dr Flanagan.  --PAD : LLE pain improved. (post Left Ileofemoral endarterectomy last admission.    5/2  Some av access arm pain last hd improved w repositioning  stable vitals  pulmonary/ thoracic surgery input noted  d/w Pt daughter    5/3  arm feels better today  for dialysis later  sacral evaluation for possible intervention    5/4 MK   - ESRD on hd: tolerating uf with hd   - Afib on AC: cardizem and titrate as needed  - Anemia: epo   - PNA with mucus plug and possible endobronchial lesion, with rising leukocytosis await timing of bronch  - rising leukocytosis with hx of cdiff: no diarrhea   LM with dr bush   bronch to be done by [facility], Dr malave, if ffp needed, will do hd with ffp in am followed by bronch ...

## 2019-05-06 NOTE — PROGRESS NOTE ADULT - ASSESSMENT
85 y/o male with ESRD on HD (MWF), HTN, RA on MTX and steroids, R leg amputation, severe CDI and megacolon, Severe PAD s/p LLE endarterectomy 2 weeks ago with dr dugan presents with T 100 and DC from L groin.  Pt also c/o R arm pain/swelling where pt had fistulogram of the AC fistula on last admission.  RUL dopplers negative for DVT.  L groin sono--negative for pseudo aneurysm and + hematoma.  Pt given 2.1 L IVF, IV vanco/aztreonam in ED  On my exam in HD suite, awake, chronically ill appearing, NAD, daughter at bedside. Pt seen by Dr dugan in ED.  CXR--clear    TTE (10/18)--mild-mod MR/EF 60%    Pt d/w daughter regarding advance directives--Pt is FULL RESUSCITATION (19 Apr 2019 16:12)  events noted while admitted muscle flap and plastic surgery care developed increased cough with possible bloody streaked sputuim / none present now and RN at bedside, both noticed thhick yellow sputum production  ct scan findings personally reveiwed / old films are not available on PACS at this time for comparison  Continued increase size of tubular oblong opacity in the right middle   lobe, possibly endobronchial mass with impacted airways. Irregular   opacity in the posterior left upper lobe, measuring 1.4 cm    Assessment / plan:  suspected pneumonia with mucus plugging RML  can not r/o endobronchial lesion  separate FERNANDO pulm nodule seen needs further eval  after antibiotic therapy  pt is not good candidate for bronch at this time  outpt PET / ct is reasonable only after course of antibiotics completed  ESRD on dilaysis now  Sleep apnea, obstructive: Requires home O2 therapy, and treatment with BIPAP  bibasilar atelectasis with associated small effusions  Prior films reviewed with ct scan abd/pelvis done 7/2018 and 10/2018 with similar RLL findings NOW increased in size which raises suspicion for malignancy higher with this finding with possible endobronchial mass also present / all discussed with his DTR   plan for BRONCH once INR corrected  Appreciate CTS recommendations

## 2019-05-06 NOTE — PROGRESS NOTE ADULT - SUBJECTIVE AND OBJECTIVE BOX
HPI: 83 y/o male with ESRD on HD (MWF), HTN, RA on MTX and steroids, R leg amputation, severe CDI and megacolon, Severe PAD s/p LLE endarterectomy (4/1/19)with dr Clement presents with T 100.5 and DC from L groin.  Pt also c/o R arm pain/swelling where pt had fistulogram of the AC fistula on last admission.    5/6: Pt was seen and examined. Pt s/p HD today. No complaints or overnight events. Pt has labile HR. EKG shows sinus tachy. NPO for possible bronchoscopy tonight vs tomorrow am.     REVIEW OF SYSTEMS:  CONSTITUTIONAL: No weakness, fevers or chills  EYES/ENT: No visual changes;  No vertigo or throat pain   NECK: No pain or stiffness  RESPIRATORY: + cough. No wheezing, hemoptysis; No shortness of breath  CARDIOVASCULAR: No chest pain or palpitations  GASTROINTESTINAL: No abdominal or epigastric pain. No nausea, vomiting, or hematemesis; No diarrhea or constipation. No melena or hematochezia.  GENITOURINARY: No dysuria, frequency or hematuria  NEUROLOGICAL: No numbness or weakness  SKIN: Left leg wound.   All other review of systems is negative unless indicated above    Vital Signs Last 24 Hrs  T(C): 36.8 (06 May 2019 12:45), Max: 36.8 (05 May 2019 17:58)  T(F): 98.2 (06 May 2019 12:45), Max: 98.3 (05 May 2019 17:58)  HR: 102 (06 May 2019 13:10) (71 - 179)  BP: 113/41 (06 May 2019 13:10) (113/41 - 160/85)  RR: 16 (06 May 2019 11:38) (14 - 18)  SpO2: 95% (06 May 2019 12:45) (89% - 99%)      PHYSICAL EXAM:  Constitutional: NAD, awake and alert, well-developed  HEENT: PERR, EOMI, Normal Hearing, MMM  Neck: Soft and supple, No LAD, No JVD  Respiratory: Diminished breath sounds on the right  Cardiovascular: S1 and S2, regular rate and rhythm, no Murmurs, gallops or rubs  Gastrointestinal: Bowel Sounds present, soft, nontender, nondistended, no guarding, no rebound  Extremities: No peripheral edema. R BKA, Left surgical site C/D/I.  Vascular: 2+ peripheral pulses  Neurological: A/O x 3, no focal deficits  Musculoskeletal: 5/5 strength b/l upper and lower extremities  Skin: No rashes    MEDICATIONS:  MEDICATIONS  (STANDING):  allopurinol 100 milliGRAM(s) Oral <User Schedule>  artificial tears (preservative free) Ophthalmic Solution 1 Drop(s) Both EYES three times a day  atorvastatin 20 milliGRAM(s) Oral at bedtime  cholestyramine Powder (Sugar-Free) 4 Gram(s) Oral daily  diltiazem    Tablet 60 milliGRAM(s) Oral four times a day  doxercalciferol Injectable 1 MICROGram(s) IV Push <User Schedule>  epoetin nelly Injectable 41255 Unit(s) IV Push <User Schedule>  finasteride 5 milliGRAM(s) Oral daily  gabapentin 300 milliGRAM(s) Oral daily  hydrocortisone sodium succinate Injectable 100 milliGRAM(s) IV Push once  lidocaine/prilocaine Cream 1 Application(s) Topical daily  pantoprazole    Tablet 40 milliGRAM(s) Oral before breakfast  predniSONE   Tablet 2.5 milliGRAM(s) Oral every other day  predniSONE   Tablet 5 milliGRAM(s) Oral every other day  sevelamer hydrochloride Oral Tab/Cap - Peds 800 milliGRAM(s) Oral three times a day with meals  silver sulfADIAZINE 1% Cream 1 Application(s) Topical daily  sodium chloride 0.9% lock flush 3 milliLiter(s) IV Push every 8 hours      LABS: All Labs Reviewed:                        10.7   20.09 )-----------( 361      ( 06 May 2019 07:45 )             34.9     05-06    137  |  105  |  48<H>  ----------------------------<  90  4.3   |  23  |  4.78<H>    Ca    8.1<L>      06 May 2019 07:45  Phos  4.1     05-06    TPro  x   /  Alb  2.0<L>  /  TBili  x   /  DBili  x   /  AST  x   /  ALT  x   /  AlkPhos  x   05-06    PT/INR - ( 06 May 2019 07:45 )   PT: 19.4 sec;   INR: 1.72 ratio

## 2019-05-06 NOTE — CHART NOTE - NSCHARTNOTEFT_GEN_A_CORE
Pt seen and examined with house staff.  Plan formulated and reviewed on rounds    Briefly, 83 y/o male with AFib on coumadin, ESRD on HD (MWF), HTN, RA on MTX and steroids, R leg amputation, severe CDI and megacolon, Severe PAD s/p LLE endarterectomy 2 weeks PTA with dr dugan admitted with post op infection/sepsis at surgical site--s/p flap on 4/23.    Pt noted to have possible RML elongated endobronchial lesion and FERNANDO lesion on CT ordered for cough and bloody sputum.    ROS o/w negative     Pt seen during HD  Stable vitals No fevers  Awake and alert  L ankle ulcer wo discharge    INR 1.7    For possible FOB today with CTS  NPO  RRT   mg IV X 1  one hr prior to OR then q8 x 2 more doses  Local wound care    Above d/w pt

## 2019-05-07 LAB
ANION GAP SERPL CALC-SCNC: 5 MMOL/L — SIGNIFICANT CHANGE UP (ref 5–17)
APTT BLD: 31.5 SEC — SIGNIFICANT CHANGE UP (ref 27.5–36.3)
APTT BLD: 60.5 SEC — HIGH (ref 27.5–36.3)
BUN SERPL-MCNC: 28 MG/DL — HIGH (ref 7–23)
CALCIUM SERPL-MCNC: 7.6 MG/DL — LOW (ref 8.5–10.1)
CHLORIDE SERPL-SCNC: 107 MMOL/L — SIGNIFICANT CHANGE UP (ref 96–108)
CO2 SERPL-SCNC: 26 MMOL/L — SIGNIFICANT CHANGE UP (ref 22–31)
CREAT SERPL-MCNC: 3.09 MG/DL — HIGH (ref 0.5–1.3)
GLUCOSE SERPL-MCNC: 168 MG/DL — HIGH (ref 70–99)
GRAM STN FLD: SIGNIFICANT CHANGE UP
HCT VFR BLD CALC: 30.3 % — LOW (ref 39–50)
HCT VFR BLD CALC: 31.3 % — LOW (ref 39–50)
HGB BLD-MCNC: 9.3 G/DL — LOW (ref 13–17)
HGB BLD-MCNC: 9.4 G/DL — LOW (ref 13–17)
INR BLD: 1.15 RATIO — SIGNIFICANT CHANGE UP (ref 0.88–1.16)
MCHC RBC-ENTMCNC: 30 GM/DL — LOW (ref 32–36)
MCHC RBC-ENTMCNC: 30.7 GM/DL — LOW (ref 32–36)
MCHC RBC-ENTMCNC: 31.2 PG — SIGNIFICANT CHANGE UP (ref 27–34)
MCHC RBC-ENTMCNC: 31.5 PG — SIGNIFICANT CHANGE UP (ref 27–34)
MCV RBC AUTO: 102.7 FL — HIGH (ref 80–100)
MCV RBC AUTO: 104 FL — HIGH (ref 80–100)
NIGHT BLUE STAIN TISS: SIGNIFICANT CHANGE UP
NRBC # BLD: 0 /100 WBCS — SIGNIFICANT CHANGE UP (ref 0–0)
NRBC # BLD: 0 /100 WBCS — SIGNIFICANT CHANGE UP (ref 0–0)
PLATELET # BLD AUTO: 259 K/UL — SIGNIFICANT CHANGE UP (ref 150–400)
PLATELET # BLD AUTO: 302 K/UL — SIGNIFICANT CHANGE UP (ref 150–400)
POTASSIUM SERPL-MCNC: 3.8 MMOL/L — SIGNIFICANT CHANGE UP (ref 3.5–5.3)
POTASSIUM SERPL-SCNC: 3.8 MMOL/L — SIGNIFICANT CHANGE UP (ref 3.5–5.3)
PROTHROM AB SERPL-ACNC: 12.8 SEC — SIGNIFICANT CHANGE UP (ref 10–12.9)
RBC # BLD: 2.95 M/UL — LOW (ref 4.2–5.8)
RBC # BLD: 3.01 M/UL — LOW (ref 4.2–5.8)
RBC # FLD: 21.8 % — HIGH (ref 10.3–14.5)
RBC # FLD: 22 % — HIGH (ref 10.3–14.5)
SODIUM SERPL-SCNC: 138 MMOL/L — SIGNIFICANT CHANGE UP (ref 135–145)
SPECIMEN SOURCE: SIGNIFICANT CHANGE UP
SPECIMEN SOURCE: SIGNIFICANT CHANGE UP
WBC # BLD: 25.81 K/UL — HIGH (ref 3.8–10.5)
WBC # BLD: 26.86 K/UL — HIGH (ref 3.8–10.5)
WBC # FLD AUTO: 25.81 K/UL — HIGH (ref 3.8–10.5)
WBC # FLD AUTO: 26.86 K/UL — HIGH (ref 3.8–10.5)

## 2019-05-07 PROCEDURE — 99232 SBSQ HOSP IP/OBS MODERATE 35: CPT

## 2019-05-07 RX ORDER — WARFARIN SODIUM 2.5 MG/1
3 TABLET ORAL DAILY
Qty: 0 | Refills: 0 | Status: DISCONTINUED | OUTPATIENT
Start: 2019-05-07 | End: 2019-05-08

## 2019-05-07 RX ORDER — HEPARIN SODIUM 5000 [USP'U]/ML
INJECTION INTRAVENOUS; SUBCUTANEOUS
Qty: 25000 | Refills: 0 | Status: DISCONTINUED | OUTPATIENT
Start: 2019-05-07 | End: 2019-05-08

## 2019-05-07 RX ADMIN — SEVELAMER CARBONATE 800 MILLIGRAM(S): 2400 POWDER, FOR SUSPENSION ORAL at 17:13

## 2019-05-07 RX ADMIN — Medication 60 MILLIGRAM(S): at 23:03

## 2019-05-07 RX ADMIN — Medication 1 DROP(S): at 15:53

## 2019-05-07 RX ADMIN — Medication 1 DROP(S): at 22:05

## 2019-05-07 RX ADMIN — HEPARIN SODIUM 1300 UNIT(S)/HR: 5000 INJECTION INTRAVENOUS; SUBCUTANEOUS at 23:03

## 2019-05-07 RX ADMIN — Medication 1 DROP(S): at 05:40

## 2019-05-07 RX ADMIN — FINASTERIDE 5 MILLIGRAM(S): 5 TABLET, FILM COATED ORAL at 12:36

## 2019-05-07 RX ADMIN — SEVELAMER CARBONATE 800 MILLIGRAM(S): 2400 POWDER, FOR SUSPENSION ORAL at 09:06

## 2019-05-07 RX ADMIN — Medication 5 MILLIGRAM(S): at 12:36

## 2019-05-07 RX ADMIN — CHOLESTYRAMINE 4 GRAM(S): 4 POWDER, FOR SUSPENSION ORAL at 09:06

## 2019-05-07 RX ADMIN — WARFARIN SODIUM 3 MILLIGRAM(S): 2.5 TABLET ORAL at 22:05

## 2019-05-07 RX ADMIN — PANTOPRAZOLE SODIUM 40 MILLIGRAM(S): 20 TABLET, DELAYED RELEASE ORAL at 05:38

## 2019-05-07 RX ADMIN — Medication 60 MILLIGRAM(S): at 17:13

## 2019-05-07 RX ADMIN — Medication 100 MILLIGRAM(S): at 09:06

## 2019-05-07 RX ADMIN — HEPARIN SODIUM 1300 UNIT(S)/HR: 5000 INJECTION INTRAVENOUS; SUBCUTANEOUS at 16:14

## 2019-05-07 RX ADMIN — ATORVASTATIN CALCIUM 20 MILLIGRAM(S): 80 TABLET, FILM COATED ORAL at 22:05

## 2019-05-07 RX ADMIN — Medication 60 MILLIGRAM(S): at 05:38

## 2019-05-07 RX ADMIN — Medication 60 MILLIGRAM(S): at 12:36

## 2019-05-07 RX ADMIN — Medication 100 MILLIGRAM(S): at 05:38

## 2019-05-07 RX ADMIN — SEVELAMER CARBONATE 800 MILLIGRAM(S): 2400 POWDER, FOR SUSPENSION ORAL at 12:36

## 2019-05-07 RX ADMIN — GABAPENTIN 300 MILLIGRAM(S): 400 CAPSULE ORAL at 12:36

## 2019-05-07 NOTE — PROGRESS NOTE ADULT - ASSESSMENT
83 y/o male with ESRD on HD (MWF), HTN, RA on MTX and steroids, R leg amputation, severe CDI and megacolon, Severe PAD s/p LLE endarterectomy 2 weeks ago with dr dugan presents with T 100 and DC from L groin.  Pt also c/o R arm pain/swelling where pt had fistulogram of the AC fistula on last admission.  RUL dopplers negative for DVT.  L groin sono--negative for pseudo aneurysm and + hematoma.  Pt given 2.1 L IVF, IV vanco/aztreonam in ED  On my exam in HD suite, awake, chronically ill appearing, NAD, daughter at bedside. Pt seen by Dr dugan in ED.  CXR--clear    TTE (10/18)--mild-mod MR/EF 60%    Pt d/w daughter regarding advance directives--Pt is FULL RESUSCITATION (19 Apr 2019 16:12)  events noted while admitted muscle flap and plastic surgery care developed increased cough with possible bloody streaked sputuim / none present now and RN at bedside, both noticed thhick yellow sputum production  ct scan findings personally reveiwed / old films are not available on PACS at this time for comparison  Continued increase size of tubular oblong opacity in the right middle   lobe, possibly endobronchial mass with impacted airways. Irregular   opacity in the posterior left upper lobe, measuring 1.4 cm    Assessment / plan:  suspected pneumonia with mucus plugging RML  BRONCH with positive endobronchial lesion RML / possible carcinoid tumor  separate FERNANDO pulm nodule seen needs further eval  after antibiotic therapy  ESRD on dilaysis now  Sleep apnea, obstructive: Requires home O2 therapy, and treatment with BIPAP  bibasilar atelectasis with associated small effusions  Prior films reviewed with ct scan abd/pelvis done 7/2018 and 10/2018 with similar RLL findings NOW increased in size which raises suspicion for malignancy higher with this finding with possible endobronchial mass also present / all discussed with his DTR     Appreciate CTS recommendations   s/p BRONCH without bx done 5/6, tolerated well  POSITIVE ENDOBRONCHIAL LESION RML  no hemoptysis this am  fu BAL and cytology / will discuss further with thoracic surgery and family regarding data/results

## 2019-05-07 NOTE — CHART NOTE - NSCHARTNOTEFT_GEN_A_CORE
Pt seen and examined with house staff.  Plan formulated and reviewed on rounds    Briefly, 83 y/o male with AFib on coumadin, ESRD on HD (MWF), HTN, RA on MTX and steroids, R leg amputation, severe CDI and megacolon, Severe PAD s/p LLE endarterectomy 2 weeks PTA with dr dugan admitted with post op infection/sepsis at surgical site--s/p flap on 4/23.    Pt noted to have RML medial segemtn elongated endobronchial lesion and FERNANDO lesion on CT ordered for cough and bloody sputum  S/P FOB with brushing and BAL--No Bx taken on 5/6    No events overnight   ROS o/w negative     Stable vitals No fevers  Awake and alert  L ankle ulcer wo discharge  Sitting in chair    INR 1.1    Restart coumadin tonight  Follow up FOB results  PO diet  RRT as per renal  Back to PO prednisone  Local wound care    Above d/w pt

## 2019-05-07 NOTE — PROGRESS NOTE ADULT - ASSESSMENT
85 y/o male with ESRD on HD (MWF), HTN, RA on MTX and steroids, R leg amputation, severe CDI and megacolon, Severe PAD s/p LLE endarterectomy (4/1/19)with dr Clement presented with fever (100.5) and DC from L groin. Pt was noted to have lung mass increased in size.     #Surgical site infection s/p endarterectomy 4/1   - Continue daily dressing as recommended by plastic surgery and wound care team.   - Continue pain control with tylenol PRN, tramadol PRN and percocet PRN.   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 Clement  - KUSH venous doppler - no evidence of DVT; markedly increased velocity in AV fistula of arm raising suspicion of hemodynamically significant stenosis  - Vascular surgery recs appreciated.       #Recurrent C. diff diarrhea with megacolon  - Monitor off abx  - ID recs appreciated      #PAF    - Continue cardizem  - Coumadin resumed with hep bridging. Stop when INR therapeutic  - Daily INR      #ESRD on HD M,W,F  -continue dialysis  -Nephro recs appreciated.       #Chronic diastolic CHF   - Last echo 2018 EF of 60-65%.       #PVD  - Coumadin resumed with hep bridging. Stop when INR therapeutic  - Daily INR    #Prophylactic Measure  - On coumadin

## 2019-05-07 NOTE — PROGRESS NOTE ADULT - ASSESSMENT
85 y/o male previous smoker with ESRD on HD (MWF), CHF, A fib, CAD (stent), COPD (home O2), SHAYY (BIPAP), HTN, HLD, RA (on MTX and steroids), gout, GERD, C diff, megacolon,  s/p R AKA, severe PAD s/p LLE endarterectomy (4/1), s/p Exploration left groin wound, and muscle flap closure (4/23) for wound infection; presents with chronic cough and intermittent hemoptysis, found to have RML opacity likely representing an endobronchial mass and a 1.4 cm FERNANDO opacity on CT Chest. S/P bronchoscopy 5/6/19.     PLAN  Neuro: Pain management  Pulm: Encourage coughing, deep breathing and use of incentive spirometry. Wean off supplemental oxygen as able. Will follow up pathology and cultures from bronch.   Cardio: Monitor telemetry/alarms. May restart AC for A fib.   GI: Tolerating diet.   Renal: Monitor urine output, supplement electrolytes as needed  Heme: Stable H/H.  ID: Off antibiotics. WBC up to 25 today.   Therapy: OOB/ambulate      Hannah BETANCOURT  Thoracic Surgery   #[id number]

## 2019-05-07 NOTE — PROGRESS NOTE ADULT - ASSESSMENT
A/P: 85 y/o male with ESRD on HD (MWF), HTN, RA on MTX and steroids, R leg amputation, severe CDI and megacolon, Severe PAD s/p LLE endarterectomy (4/1/19)with dr Clement presented with fever (100.5) and DC from L groin. Pt was noted to have lung mass increased in size.     1. S/p lung bx, bronchoscopy 5/6- tolerated procedure well. Pulmonary following.   Tolerated procedure well. Post op care as per pulm.    2. Atrial fibrillation with high CHADVasc score is high.  INR 1.15 today. Restart heparin drip with INR <2 when ok by pulm. Restart coumadin as well.     3. CAD s/p PCI- continue current meds.  no anginal symptoms    4. HTN. Cont current regimen. BP well controlled.

## 2019-05-07 NOTE — PROGRESS NOTE ADULT - SUBJECTIVE AND OBJECTIVE BOX
Patient is a 84y old  Male who presents with a chief complaint of L groin DC (28 Apr 2019 17:14)      HPI:  83 y/o male with ESRD on HD (MWF), HTN, RA on MTX and steroids, R leg amputation, severe CDI and megacolon, Severe PAD s/p LLE endarterectomy 2 weeks ago with dr dugan presents with T 100 and DC from L groin.  Pt also c/o R arm pain/swelling where pt had fistulogram of the AC fistula on last admission.  RUL dopplers negative for DVT.  L groin sono--negative for pseudo aneurysm and + hematoma.  Pt given 2.1 L IVF, IV vanco/aztreonam in ED  On my exam in HD suite, awake, chronically ill appearing, NAD, daughter at bedside. Pt seen by Dr dugan in ED.  CXR--clear    TTE (10/18)--mild-mod MR/EF 60%    Pt d/w daughter regarding advance directives--Pt is FULL RESUSCITATION (19 Apr 2019 16:12)  events noted while admitted muscle flap and plastic surgery care developed increased cough with possible bloody streaked sputuim / none present now and RN at bedside, both noticed thhick yellow sputum production    4/30  data discussed with RN at bedside  no cp  improved breathing    5/1  feels the same  decreased cough  DTR is contacted and data discussed regarding abnormal ct scan findings  5/2  data discussed with pt's DTR and medical team as well  although BRONCH is appropriate given ct scan findings, it can not be done yet till INR corrected specially in pt with ESRD.  DATA DISCUSSED WITH DR JORGE MASSEY AS WELL for thoracic surgery consult  5/3  data discussed with thoracic surgery yesterday  discussed with dr Rashid today  pt is resting comfortably  no resp distress  no hemoptysis    5/4  No acute events occurred overnight  Discussed with hospitalist/resident team and daughter     5/5  INR still elevated   No acute pulmonary events occurred overnight  5/6 uneventful night  in dialysis  bronch postponed till INR corrected  5/7  s/p BRONCH without bx yesterday afternoon  tolerated well  POSITIVE ENDOBRONCHIAL LESION RML  no hemoptysis this am  feels ok    MEDICATIONS  (STANDING):  allopurinol 100 milliGRAM(s) Oral <User Schedule>  artificial tears (preservative free) Ophthalmic Solution 1 Drop(s) Both EYES three times a day  atorvastatin 20 milliGRAM(s) Oral at bedtime  cholestyramine Powder (Sugar-Free) 4 Gram(s) Oral daily  diltiazem    Tablet 60 milliGRAM(s) Oral four times a day  doxercalciferol Injectable 1 MICROGram(s) IV Push <User Schedule>  epoetin nelly Injectable 07055 Unit(s) IV Push <User Schedule>  finasteride 5 milliGRAM(s) Oral daily  gabapentin 300 milliGRAM(s) Oral daily  hydrocortisone sodium succinate Injectable 100 milliGRAM(s) IV Push every 8 hours  lidocaine/prilocaine Cream 1 Application(s) Topical daily  pantoprazole    Tablet 40 milliGRAM(s) Oral before breakfast  predniSONE   Tablet 5 milliGRAM(s) Oral every other day  predniSONE   Tablet 2.5 milliGRAM(s) Oral every other day  sevelamer carbonate 800 milliGRAM(s) Oral three times a day with meals  silver sulfADIAZINE 1% Cream 1 Application(s) Topical daily    MEDICATIONS  (PRN):  acetaminophen   Tablet .. 650 milliGRAM(s) Oral every 6 hours PRN Temp greater or equal to 38.5C (101.3F)  acetaminophen   Tablet .. 650 milliGRAM(s) Oral every 6 hours PRN Mild Pain (1 - 3)  ALBUTerol    90 MICROgram(s) HFA Inhaler 2 Puff(s) Inhalation every 6 hours PRN Shortness of Breath and/or Wheezing  guaiFENesin   Syrup  (Sugar-Free) 100 milliGRAM(s) Oral every 6 hours PRN Cough  oxyCODONE    5 mG/acetaminophen 325 mG 1 Tablet(s) Oral every 4 hours PRN Moderate Pain (4 - 6)    Vital Signs Last 24 Hrs  T(C): 36.5 (07 May 2019 05:40), Max: 37.3 (06 May 2019 16:46)  T(F): 97.7 (07 May 2019 05:40), Max: 99.1 (06 May 2019 16:46)  HR: 63 (07 May 2019 05:40) (63 - 179)  BP: 133/48 (07 May 2019 05:40) (107/61 - 167/43)  BP(mean): --  RR: 17 (07 May 2019 05:40) (14 - 20)  SpO2: 94% (07 May 2019 05:40) (89% - 99%)  PHYSICAL EXAM    HEENT: PERRL, conjunctiva clear, EOM's intact, non injected pharynx, no exudate, TM   normal  Neck: Supple, , no adenopathy, thyroid: not enlarged, no carotid bruit or JVD  Back: Symmetric, no  tenderness,no soft tissue tenderness  Lungs: Clear to auscultation bilateral,no adventitious breath sounds, normal   expiratory phase  Heart: Regular rate and rhythm, S1, S2 normal, no murmur, rub or gallop  Abdomen: Soft, non-tender, bowel sounds active , no hepatosplenomegaly  Extremities: no cyanosis or edema, no joint swelling, hx AKA right side  Skin: Skin color, texture normal, no rashes   Neurologic: Alert and oriented X3 , cranial nerves intact, sensory and motor normal,    ECG:    LABS:                                 10.7   20.09 )-----------( 361      ( 06 May 2019 07:45 )             34.9   05-06    137  |  105  |  48<H>  ----------------------------<  90  4.3   |  23  |  4.78<H>    Ca    8.1<L>      06 May 2019 07:45  Phos  4.1     05-06    TPro  x   /  Alb  2.0<L>  /  TBili  x   /  DBili  x   /  AST  x   /  ALT  x   /  AlkPhos  x   05-06               9.8    16.81 )-----------( 443      ( 01 May 2019 06:35 )             32.1   PT/INR - ( 06 May 2019 07:45 )   PT: 19.4 sec;   INR: 1.72 ratio                                 9.9    13.16 )-----------( 358      ( 30 Apr 2019 06:08 )             32.1                           10.1   7.96  )-----------( 334      ( 29 Apr 2019 07:08 )             33.2     04-29    04-30    142  |  111<H>  |  19  ----------------------------<  87  3.8   |  24  |  2.70<H>    Ca    8.3<L>      30 Apr 2019 06:08  Phos  3.4     04-29    TPro  x   /  Alb  1.8<L>  /  TBili  x   /  DBili  x   /  AST  x   /  ALT  x   /  AlkPhos  x   04-29  138  |  108  |  35<H>  ----------------------------<  95  4.4   |  22  |  4.61<H>    Ca    8.0<L>      29 Apr 2019 07:08              PT/INR - ( 29 Apr 2019 07:08 )   PT: 39.9 sec;   INR: 3.46 ratio                   RADIOLOGY & ADDITIONAL STUDIES:  < from: CT Chest No Cont (04.28.19 @ 12:17) >  PROCEDURE:   CT of the Chest was performed without intravenous contrast.  Sagittal and coronal reformats were performed.    FINDINGS:    LUNGS AND LARGE AIRWAYS: Emphysema. Continued increase size of tubular   oblong opacity in the right middle lobe, possibly endobronchial mass with   impacted airways. Possible associated broncholiths. Irregular opacity in   the posterior left upper lobe (series 3, image 60), measuring 1.4 cm.  PLEURA: Small left pleural effusion. Trace right pleural effusion.  VESSELS: Atherosclerotic calcifications.   HEART: Heart size is normal. No pericardial effusion.  MEDIASTINUM AND [place]: No lymphadenopathy.  CHEST WALL AND LOWER NECK: Left thyroid nodule, measuring 2.9 cm.   Extension of the thyroid gland into the superior mediastinum.   VISUALIZED UPPER ABDOMEN: Cholelithiasis. Unchanged probable cyst in   hepatic segment 8.  BONES: Degenerative changes of the spine.    IMPRESSION:   Continued increase size of tubular oblong opacity in the right middle   lobe, possibly endobronchial mass with impacted airways. Irregular   opacity in the posterior left upper lobe, measuring 1.4 cm. PET/CT is   recommended for further evaluation.    Left thyroid nodule, measuring 2.9 cm. Further evaluation with thyroid   ultrasound is recommended.    < end of copied text >

## 2019-05-07 NOTE — PROGRESS NOTE ADULT - SUBJECTIVE AND OBJECTIVE BOX
NEPHROLOGY INTERVAL HPI/OVERNIGHT EVENTS:    Date of Service: 05-07-19 @ 14:43  5/7 SY  Post Bronchoscopy last night.  No complications.  Feeling well.  Complains of sacral decub wound.    5/3  up in chair  has pain on buttocks will be evaluated by nursing for breakdown  for dialysis today     5/2  thoracic surgery/ pulmonary input noted   d/w Pts daughter  tolerating dialysis sessions    5/1 SY  Feeling well today.  No distress noted.    HPI:  83 y/o male with ESRD on HD (MWF), HTN, RA on MTX and steroids, R leg amputation, severe CDI and megacolon, Severe PAD s/p LLE endarterectomy 2 weeks ago with dr dugan presents with T 100 and DC from L groin.  Pt also c/o R arm pain/swelling where pt had fistulogram of the AC fistula on last admission.  RUL dopplers negative for DVT.  L groin sono--negative for pseudo aneurysm and + hematoma.  Pt given 2.1 L IVF, IV vanco/aztreonam in ED  On my exam in HD suite, awake, chronically ill appearing, NAD, daughter at bedside. Pt seen by Dr dugan in ED.  CXR--clear    TTE (10/18)--mild-mod MR/EF 60%    Pt d/w daughter regarding advance directives--Pt is FULL RESUSCITATION (19 Apr 2019 16:12)      MEDICATIONS  (STANDING):  allopurinol 100 milliGRAM(s) Oral <User Schedule>  artificial tears (preservative free) Ophthalmic Solution 1 Drop(s) Both EYES three times a day  atorvastatin 20 milliGRAM(s) Oral at bedtime  cholestyramine Powder (Sugar-Free) 4 Gram(s) Oral daily  diltiazem    Tablet 60 milliGRAM(s) Oral four times a day  doxercalciferol Injectable 1 MICROGram(s) IV Push <User Schedule>  epoetin nelly Injectable 09254 Unit(s) IV Push <User Schedule>  finasteride 5 milliGRAM(s) Oral daily  gabapentin 300 milliGRAM(s) Oral daily  lidocaine/prilocaine Cream 1 Application(s) Topical daily  pantoprazole    Tablet 40 milliGRAM(s) Oral before breakfast  predniSONE   Tablet 5 milliGRAM(s) Oral every other day  predniSONE   Tablet 2.5 milliGRAM(s) Oral every other day  sevelamer carbonate 800 milliGRAM(s) Oral three times a day with meals  silver sulfADIAZINE 1% Cream 1 Application(s) Topical daily  warfarin 3 milliGRAM(s) Oral daily    MEDICATIONS  (PRN):  acetaminophen   Tablet .. 650 milliGRAM(s) Oral every 6 hours PRN Temp greater or equal to 38.5C (101.3F)  acetaminophen   Tablet .. 650 milliGRAM(s) Oral every 6 hours PRN Mild Pain (1 - 3)  ALBUTerol    90 MICROgram(s) HFA Inhaler 2 Puff(s) Inhalation every 6 hours PRN Shortness of Breath and/or Wheezing  guaiFENesin   Syrup  (Sugar-Free) 100 milliGRAM(s) Oral every 6 hours PRN Cough  oxyCODONE    5 mG/acetaminophen 325 mG 1 Tablet(s) Oral every 4 hours PRN Moderate Pain (4 - 6)    Vital Signs Last 24 Hrs  T(C): 36.5 (07 May 2019 11:37), Max: 37.3 (06 May 2019 16:46)  T(F): 97.7 (07 May 2019 11:37), Max: 99.1 (06 May 2019 16:46)  HR: 69 (07 May 2019 11:37) (63 - 89)  BP: 143/45 (07 May 2019 11:37) (107/61 - 167/43)  BP(mean): --  RR: 18 (07 May 2019 11:37) (14 - 20)  SpO2: 98% (07 May 2019 11:37) (94% - 99%)    PHYSICAL EXAM:  Alert and appropriate  GENERAL: No distress  CHEST/LUNG: Minimal right basilar rhonchi  HEART: S1S2 RRR  ABDOMEN: soft  EXTREMITIES: trace edema  SKIN:     LABS:                        9.4    25.81 )-----------( 259      ( 07 May 2019 06:54 )             31.3     05-07    138  |  107  |  28<H>  ----------------------------<  168<H>  3.8   |  26  |  3.09<H>    Ca    7.6<L>      07 May 2019 06:54  Phos  4.1     05-06    TPro  x   /  Alb  2.0<L>  /  TBili  x   /  DBili  x   /  AST  x   /  ALT  x   /  AlkPhos  x   05-06    PT/INR - ( 07 May 2019 06:54 )   PT: 12.8 sec;   INR: 1.15 ratio         PTT - ( 07 May 2019 06:54 )  PTT:31.5 sec            RADIOLOGY & ADDITIONAL TESTS:

## 2019-05-07 NOTE — PROGRESS NOTE ADULT - SUBJECTIVE AND OBJECTIVE BOX
Subjective: Patient comfortable OOB in chair on supplemental oxygen via NC. Admits to only coughing up two small amounts of slightly bloody sputum post bronchoscopy overnight.  Denies fever, chills, chest pain, cough, and hemoptysis. Received Cardizem overnight for sinus tachycardia. Last HD 5/6.     Vital Signs:  Vital Signs Last 24 Hrs  T(C): 36.5 (05-07-19 @ 05:40), Max: 37.3 (05-06-19 @ 16:46)  T(F): 97.7 (05-07-19 @ 05:40), Max: 99.1 (05-06-19 @ 16:46)  HR: 63 (05-07-19 @ 05:40) (63 - 179)  BP: 133/48 (05-07-19 @ 05:40) (107/61 - 167/43)  RR: 17 (05-07-19 @ 05:40) (14 - 20)  SpO2: 94% (05-07-19 @ 05:40) (94% - 99%) on (O2)    I&O's Detail  Not recorded    Exam:  General: WN/WD NAD  Neurology: Awake, nonfocal, MURPHY x 3  Eyes: Scleras clear, PERRLA/ EOMI, Gross vision intact  ENT: Gross hearing intact, grossly patent pharynx, no stridor  Neck: Neck supple, trachea midline, No JVD  Respiratory: CTA B/L, No wheezing, rales, rhonchi  CV: RRR, S1S2, no murmurs, rubs or gallops  Abdominal: Soft, NT, ND  Extremities: No edema  Skin: No Rashes, Hematoma, Ecchymosis, L groin wound dressing c/d/i  Psych: Oriented x 3, normal affect      Relevant labs, radiology and Medications reviewed                        9.4    25.81 )-----------( 259      ( 07 May 2019 06:54 )             31.3     05-07    138  |  107  |  28<H>  ----------------------------<  168<H>  3.8   |  26  |  3.09<H>    Ca    7.6<L>      07 May 2019 06:54  Phos  4.1     05-06    TPro  x   /  Alb  2.0<L>  /  TBili  x   /  DBili  x   /  AST  x   /  ALT  x   /  AlkPhos  x   05-06    PT/INR - ( 07 May 2019 06:54 )   PT: 12.8 sec;   INR: 1.15 ratio         PTT - ( 07 May 2019 06:54 )  PTT:31.5 sec  MEDICATIONS  (STANDING):  allopurinol 100 milliGRAM(s) Oral <User Schedule>  artificial tears (preservative free) Ophthalmic Solution 1 Drop(s) Both EYES three times a day  atorvastatin 20 milliGRAM(s) Oral at bedtime  cholestyramine Powder (Sugar-Free) 4 Gram(s) Oral daily  diltiazem    Tablet 60 milliGRAM(s) Oral four times a day  doxercalciferol Injectable 1 MICROGram(s) IV Push <User Schedule>  epoetin nelly Injectable 59717 Unit(s) IV Push <User Schedule>  finasteride 5 milliGRAM(s) Oral daily  gabapentin 300 milliGRAM(s) Oral daily  lidocaine/prilocaine Cream 1 Application(s) Topical daily  pantoprazole    Tablet 40 milliGRAM(s) Oral before breakfast  predniSONE   Tablet 5 milliGRAM(s) Oral every other day  predniSONE   Tablet 2.5 milliGRAM(s) Oral every other day  sevelamer carbonate 800 milliGRAM(s) Oral three times a day with meals  silver sulfADIAZINE 1% Cream 1 Application(s) Topical daily    MEDICATIONS  (PRN):  acetaminophen   Tablet .. 650 milliGRAM(s) Oral every 6 hours PRN Temp greater or equal to 38.5C (101.3F)  acetaminophen   Tablet .. 650 milliGRAM(s) Oral every 6 hours PRN Mild Pain (1 - 3)  ALBUTerol    90 MICROgram(s) HFA Inhaler 2 Puff(s) Inhalation every 6 hours PRN Shortness of Breath and/or Wheezing  guaiFENesin   Syrup  (Sugar-Free) 100 milliGRAM(s) Oral every 6 hours PRN Cough  oxyCODONE    5 mG/acetaminophen 325 mG 1 Tablet(s) Oral every 4 hours PRN Moderate Pain (4 - 6)        Assessment  84y Male  w/ PAST MEDICAL & SURGICAL HISTORY:  Above knee amputation of right lower extremity  Recurrent Clostridium difficile diarrhea  Diastolic CHF  Peripheral vascular disease  Afib  Anemia  CKD (chronic kidney disease)  COPD (chronic obstructive pulmonary disease)  SHAYY (obstructive sleep apnea)  Sepsis, due to unspecified organism: 2/2 poorly healing wounds b/l  Dyspepsia: On moderate exertion.  Sleep apnea, obstructive: Requires home 02 therapy, and treatment with BIPAP  Atelectasis  Pleural effusion, bilateral  Respiratory failure  Peripheral edema  CRI (chronic renal insufficiency)  Gout  Benign prostatic hypertrophy  Spinal stenosis  Hypercholesterolemia  GERD (gastroesophageal reflux disease)  CAD (coronary artery disease)  Hypertension  S/P angioplasty with stent  Cataract of left eye  Prostate: Surgery green light procedure.  S/P rotator cuff surgery: Right  S/P angioplasty  admitted with complaints of Patient is a 84y old  Male who presents with a chief complaint of L groin DC (07 May 2019 08:19)  , patient underwent Bronchoscopy, adult  Debridement of open wound, 20 sq cm or less

## 2019-05-07 NOTE — PROGRESS NOTE ADULT - SUBJECTIVE AND OBJECTIVE BOX
HPI: 85 y/o male with ESRD on HD (MWF), HTN, RA on MTX and steroids, R leg amputation, severe CDI and megacolon, Severe PAD s/p LLE endarterectomy (4/1/19)with dr Clement presents with T 100.5 and DC from L groin.  Pt also c/o R arm pain/swelling where pt had fistulogram of the AC fistula on last admission.    5/7: Pt was seen and examined. Pt is s/p bronchoscopy 5/6. Pt is doing well. Pain well controlled. No overnight events. Called daughter with updates. All questions and concerns addressed. They will notify if any additional concerns.    REVIEW OF SYSTEMS:  CONSTITUTIONAL: No weakness, fevers or chills  EYES/ENT: No visual changes;  No vertigo or throat pain   NECK: No pain or stiffness  RESPIRATORY: + cough. No wheezing, hemoptysis; No shortness of breath  CARDIOVASCULAR: No chest pain or palpitations  GASTROINTESTINAL: No abdominal or epigastric pain. No nausea, vomiting, or hematemesis; No diarrhea or constipation. No melena or hematochezia.  GENITOURINARY: No dysuria, frequency or hematuria  NEUROLOGICAL: No numbness or weakness  SKIN: Left leg wound.   All other review of systems is negative unless indicated above    Vital Signs Last 24 Hrs  T(C): 36.5 (07 May 2019 11:37), Max: 37.3 (06 May 2019 16:46)  T(F): 97.7 (07 May 2019 11:37), Max: 99.1 (06 May 2019 16:46)  HR: 69 (07 May 2019 11:37) (63 - 89)  BP: 143/45 (07 May 2019 11:37) (107/61 - 167/43)  RR: 18 (07 May 2019 11:37) (14 - 20)  SpO2: 98% (07 May 2019 11:37) (94% - 99%)      PHYSICAL EXAM:  Constitutional: NAD, awake and alert, well-developed  HEENT: PERR, EOMI, Normal Hearing, MMM  Neck: Soft and supple, No LAD, No JVD  Respiratory: Diminished breath sounds on the right  Cardiovascular: S1 and S2, regular rate and rhythm, no Murmurs, gallops or rubs  Gastrointestinal: Bowel Sounds present, soft, nontender, nondistended, no guarding, no rebound  Extremities: No peripheral edema. R BKA, Left surgical site C/D/I.  Vascular: 2+ peripheral pulses  Neurological: A/O x 3, no focal deficits  Musculoskeletal: 5/5 strength b/l upper and lower extremities  Skin: No rashes    MEDICATIONS:  MEDICATIONS  (STANDING):  allopurinol 100 milliGRAM(s) Oral <User Schedule>  artificial tears (preservative free) Ophthalmic Solution 1 Drop(s) Both EYES three times a day  atorvastatin 20 milliGRAM(s) Oral at bedtime  cholestyramine Powder (Sugar-Free) 4 Gram(s) Oral daily  diltiazem    Tablet 60 milliGRAM(s) Oral four times a day  doxercalciferol Injectable 1 MICROGram(s) IV Push <User Schedule>  epoetin nelly Injectable 86977 Unit(s) IV Push <User Schedule>  finasteride 5 milliGRAM(s) Oral daily  gabapentin 300 milliGRAM(s) Oral daily  lidocaine/prilocaine Cream 1 Application(s) Topical daily  pantoprazole    Tablet 40 milliGRAM(s) Oral before breakfast  predniSONE   Tablet 5 milliGRAM(s) Oral every other day  predniSONE   Tablet 2.5 milliGRAM(s) Oral every other day  sevelamer carbonate 800 milliGRAM(s) Oral three times a day with meals  silver sulfADIAZINE 1% Cream 1 Application(s) Topical daily  warfarin 3 milliGRAM(s) Oral daily      LABS: All Labs Reviewed:                        9.4    25.81 )-----------( 259      ( 07 May 2019 06:54 )             31.3     05-07    138  |  107  |  28<H>  ----------------------------<  168<H>  3.8   |  26  |  3.09<H>    Ca    7.6<L>      07 May 2019 06:54  Phos  4.1     05-06    TPro  x   /  Alb  2.0<L>  /  TBili  x   /  DBili  x   /  AST  x   /  ALT  x   /  AlkPhos  x   05-06    PT/INR - ( 07 May 2019 06:54 )   PT: 12.8 sec;   INR: 1.15 ratio         PTT - ( 07 May 2019 06:54 )  PTT:31.5 sec      Blood Culture: 05-06 @ 19:49  Organism --  Gram Stain Blood -- Gram Stain   No polymorphonuclear cells seen per low power field  No squamous epithelial cells per low power field  No organisms seen per oil power field  Specimen Source Bronch Wash right lung middle lobe washings for culture  Culture-Blood --

## 2019-05-07 NOTE — PROGRESS NOTE ADULT - SUBJECTIVE AND OBJECTIVE BOX
Cardiology Progress Note    HPI: 83 y/o male with ESRD on HD (MWF), HTN, RA on MTX and steroids, R leg amputation, severe CDI and megacolon, Severe PAD s/p LLE endarterectomy  presents with T 100 and DC from L groin initally.  Pt is planned to have an endobronchial lesion biopsy of the right lung.  He denies any CP or pressure or SOB.    5/7. Not on tele. S/p lung bx yesterday. No CP/SOB.   Lying flat in bed.     PAST MEDICAL & SURGICAL HISTORY:  Above knee amputation of right lower extremity  Recurrent Clostridium difficile diarrhea  Diastolic CHF  Peripheral vascular disease  Afib  Anemia  CKD (chronic kidney disease)  COPD (chronic obstructive pulmonary disease)  SHAYY (obstructive sleep apnea)  Sepsis, due to unspecified organism: 2/2 poorly healing wounds b/l  Dyspepsia: On moderate exertion.  Sleep apnea, obstructive: Requires home 02 therapy, and treatment with BIPAP  Atelectasis  Pleural effusion, bilateral  Respiratory failure  Peripheral edema  CRI (chronic renal insufficiency)  Gout  Benign prostatic hypertrophy  Spinal stenosis  Hypercholesterolemia  GERD (gastroesophageal reflux disease)  CAD (coronary artery disease)  Hypertension  S/P angioplasty with stent  Cataract of left eye  Prostate: Surgery green light procedure.  S/P rotator cuff surgery: Right  S/P angioplasty      MEDICATIONS  (STANDING):  allopurinol 100 milliGRAM(s) Oral <User Schedule>  aspirin  chewable 81 milliGRAM(s) Oral daily  atorvastatin 20 milliGRAM(s) Oral at bedtime  cholestyramine Powder (Sugar-Free) 4 Gram(s) Oral daily  diltiazem    Tablet 60 milliGRAM(s) Oral four times a day  doxercalciferol Injectable 1 MICROGram(s) IV Push <User Schedule>  epoetin nelly Injectable 60693 Unit(s) IV Push <User Schedule>  finasteride 5 milliGRAM(s) Oral daily  lidocaine/prilocaine Cream 1 Application(s) Topical daily  pantoprazole    Tablet 40 milliGRAM(s) Oral before breakfast  predniSONE   Tablet 2.5 milliGRAM(s) Oral every other day  predniSONE   Tablet 5 milliGRAM(s) Oral every other day  sevelamer hydrochloride Oral Tab/Cap - Peds 800 milliGRAM(s) Oral three times a day with meals  silver sulfADIAZINE 1% Cream 1 Application(s) Topical daily  sodium chloride 0.9% lock flush 3 milliLiter(s) IV Push every 8 hours  warfarin 2 milliGRAM(s) Oral once    MEDICATIONS  (PRN):  acetaminophen   Tablet .. 650 milliGRAM(s) Oral every 6 hours PRN Temp greater or equal to 38.5C (101.3F)  acetaminophen   Tablet .. 650 milliGRAM(s) Oral every 6 hours PRN Mild Pain (1 - 3)  ALBUTerol    90 MICROgram(s) HFA Inhaler 2 Puff(s) Inhalation every 6 hours PRN Shortness of Breath and/or Wheezing  guaiFENesin   Syrup  (Sugar-Free) 100 milliGRAM(s) Oral every 6 hours PRN Cough  oxyCODONE    5 mG/acetaminophen 325 mG 1 Tablet(s) Oral every 4 hours PRN Moderate Pain (4 - 6)      FAMILY HISTORY:  Family history of colorectal cancer (Father)  Family history of diabetes mellitus (Mother, Sibling)  Family history of hypertension (Mother)      SOCIAL HISTORY:  no recent smoking     ICU Vital Signs Last 24 Hrs  T(C): 36.8 (04 May 2019 05:17), Max: 36.8 (04 May 2019 05:17)  T(F): 98.2 (04 May 2019 05:17), Max: 98.2 (04 May 2019 05:17)  HR: 73 (04 May 2019 05:17) (68 - 92)  BP: 143/48 (04 May 2019 05:17) (104/38 - 178/48)  BP(mean): --  ABP: --  ABP(mean): --  RR: 18 (04 May 2019 05:17) (14 - 18)  SpO2: 100% (04 May 2019 05:17) (97% - 100%)    REVIEW OF SYSTEMS:  CONSTITUTIONAL:  c/o fatigue  HEENT:  Eyes:  No visual changes.     ENT:  No epistaxis.  No sinus pain.    RESPIRATORY:  c/o hemoptysis.  CARDIOVASCULAR:  No chest pains.  No palpitations. No shortness of breath, No orthopnea or PND.  GASTROINTESTINAL:  No abdominal pain.  No nausea or vomiting.    GENITOURINARY:    No hematuria.    MUSCULOSKELETAL:  No musculoskeletal pain.  No joint swelling.  No arthritis.  NEUROLOGICAL:  No tingling or numbness or weakness.  PSYCHIATRIC:  No confusion  SKIN:  brusing , c/o R buttock pain         PHYSICAL EXAM-    Constitutional: no acute distress    Head: Head is normocephalic and atraumatic.      Neck:  No JVD.     Cardiovascular: Regular rate and rhythm without S3, S4. No murmurs or rubs are appreciated.      Respiratory: Breath sounds are normal. No rales. No wheezing.    Abdomen: Soft, nontender, nondistended with positive bowel sounds.      Extremity: No tenderness. No  pitting edema     Neurologic: The patient is alert and oriented.      INTERPRETATION OF TELEMETRY: not on tele    ECG: Sinus rythm, normal axis, no ST T changes     I&O's Detail    01 May 2019 07:01  -  02 May 2019 07:00  --------------------------------------------------------  IN:    Oral Fluid: 100 mL  Total IN: 100 mL    OUT:  Total OUT: 0 mL    Total NET: 100 mL                          10.4   14.54 )-----------( 403      ( 03 May 2019 06:59 )             32.7     05-04    137  |  103  |  17  ----------------------------<  84  3.8   |  27  |  2.54<H>    Ca    8.1<L>      04 May 2019 07:51  Phos  3.3     05-03    TPro  x   /  Alb  2.0<L>  /  TBili  x   /  DBili  x   /  AST  x   /  ALT  x   /  AlkPhos  x   05-03    LIVER FUNCTIONS - ( 03 May 2019 14:00 )  Alb: 2.0 g/dL / Pro: x     / ALK PHOS: x     / ALT: x     / AST: x     / GGT: x           PT/INR - ( 04 May 2019 07:51 )   PT: 41.4 sec;   INR: 3.59 ratio         PTT - ( 04 May 2019 07:51 )  PTT:45.4 sec       EXAM:  ECHO TTE WO CON COMP W DOP         PROCEDURE DATE:  10/31/2018        INTERPRETATION:  Transthoracic Echocardiography Report (TTE)     Demographics     Patient name        TERRI NEWTON      Age           83 year(s)     Med Rec #           [id number]         Gender        Male     Account #           [id number]           Date of Birth 12/21/1934     Interpreting        Michele Zamora,   Room Number   0033   Physician           MD Michele Zamora MD     Referring Physician SUK-HYEON YUN MD  Sonographer   Opal Otto RDCS     Date of study       10/31/2018 09:55                       AM     Height              69 in             Weight     143.3 pounds    Type of Study:     TTE procedure: ECHO TTE WO CON COMP W DOP     BP: 133/40 mmHg     Study Location: [facility] Quality: Fair    Indications   1) I31.3 - Pericardial effusion noninflammatory    M-Mode Measurements (cm)     LVEDd: 5.57 cm            LVESd: 3.34 cm   IVSEd: 1.3 cm   LVPWd: 1.2 cm             AO Root Dimension: 3.5 cm                             ACS: 2.2 cm    Doppler Measurements:      MV Peak E-Wave: 98.2 cm/s    MV Peak A-Wave: 115 cm/s    MV E/A Ratio: 0.85 %    MV Peak Gradient: 3.86 mmHg     Findings     Mitral Valve   Normal appearing mitral valve structure and function.   Mild to Moderate mitral regurgitation is present.   Mild mitral annular calcification is present.     Aortic Valve   Mild aorticsclerosis is present with normal valvular opening.     Tricuspid Valve   Normal appearing tricuspid valve structure and function.   Trace tricuspid valve regurgitation is present.     Pulmonic Valve   Normal appearing pulmonic valve structure and function.   Mild pulmonic valvular regurgitation (1+) is present.     Left Atrium   The left atrium is moderately dilated.     Left Ventricle   The left ventricle is normal in size, wall motion and contractility.   Mild concentric left ventricular hypertrophy is present.   Estimated left ventricular ejection fraction is 60-65 %.     Right Atrium   Normal appearing right atrium.     Right Ventricle   Normal appearing right ventricle structure and function.     Pericardial Effusion   Trivial pericardial effusion is present.     Pleural Effusion   No evidence of pleural effusion.     Miscellaneous   All visualized extra cardiac structures appears to be normal.     Impression     Signature     ----------------------------------------------------------------   Electronically signed by Michele Zamora MD(Interpreting   physician) on 10/31/2018 12:26 PM   ----------------------------------------------------------------    Valves    I&O's Summary    01 May 2019 07:01  -  02 May 2019 07:00  --------------------------------------------------------  IN: 100 mL / OUT: 0 mL / NET: 100 mL      BNP  RADIOLOGY & ADDITIONAL STUDIES:  < from: Transthoracic Echocardiogram (10.31.18 @ 10:22) >     EXAM:  ECHO TTE WO CON COMP W DOP         PROCEDURE DATE:  10/31/2018        INTERPRETATION:  Transthoracic Echocardiography Report (TTE)     Demographics     Patient name        TERRI NEWTON      Age           83 year(s)     Med Rec #           [id number]         Gender        Male     Account #           [id number]           Date of Birth 12/21/1934     Interpreting        Michele Zamora,   Room Number   0033   Physician           MD Michele Zamora MD     Referring Physician SUK-HYEON YUN MD  Sonographer   Opal Otto,                                                       [facility]     Date of study       10/31/2018 09:55                       AM     Height              69 in             Weight     143.3 pounds    Type of Study:     TTE procedure: ECHO TTE WO CON COMP W DOP     BP: 133/40 mmHg     Study Location: [facility] Quality: Fair    Indications   1) I31.3 - Pericardial effusion noninflammatory    M-Mode Measurements (cm)     LVEDd: 5.57 cm            LVESd: 3.34 cm   IVSEd: 1.3 cm   LVPWd: 1.2 cm             AO Root Dimension: 3.5 cm                             ACS: 2.2 cm    Doppler Measurements:      MV Peak E-Wave: 98.2 cm/s    MV Peak A-Wave: 115 cm/s    MV E/A Ratio: 0.85 %    MV Peak Gradient: 3.86 mmHg     Findings     Mitral Valve   Normal appearing mitral valve structure and function.   Mild to Moderate mitral regurgitation is present.   Mild mitral annular calcification is present.     Aortic Valve   Mild aorticsclerosis is present with normal valvular opening.     Tricuspid Valve   Normal appearing tricuspid valve structure and function.   Trace tricuspid valve regurgitation is present.     Pulmonic Valve   Normal appearing pulmonic valve structure and function.   Mild pulmonic valvular regurgitation (1+) is present.     Left Atrium   The left atrium is moderately dilated.     Left Ventricle   The left ventricle is normal in size, wall motion and contractility.   Mild concentric left ventricular hypertrophy is present.   Estimated left ventricular ejection fraction is 60-65 %.     Right Atrium   Normal appearing right atrium.     Right Ventricle   Normal appearing right ventricle structure and function.     Pericardial Effusion   Trivial pericardial effusion is present.     Pleural Effusion   No evidence of pleural effusion.     Miscellaneous   All visualized extra cardiac structures appears to be normal.     Impression

## 2019-05-07 NOTE — PROGRESS NOTE ADULT - ASSESSMENT
85 y/o male with ESRD on HD (MWF), HTN, RA on MTX and steroids, R leg amputation, severe CDI and megacolon, Severe PAD s/p LLE endarterectomy 2 weeks ago with dr dugan presents with T 100 and DC from L groin.  Pt also c/o R arm pain/swelling where pt had fistulogram of the AC fistula on last admission.  RUL dopplers negative for DVT.  L groin sono--negative for pseudo aneurysm and + hematoma.  Pt given 2.1 L IVF, IV vanco/aztreonam in ED  On my exam in HD suite, awake, chronically ill appearing, NAD, daughter at bedside. Pt seen by Dr dugan in ED.  CXR--clear    4/20  s/p hd yesterday  prolonged bleed from access stopped w pressure  feels well  arm less tender  received 1 u prbc on hd yesterday  monitor cbc    4/22 SY  --ESRD : HD order and tx reviewed.   Tolerating tx well.  --AVF : as above.  Prolonged bleeding post tx.  Venous pressure acceptable today.  Monitor AVF function.  --Anemia : with acute loss.  Active infection leading to LETICIA hyporesponsiveness.  Transfuse 2 units today.  --PAD : post Left Ileofemoral Endarterectomy : Monitor Left foot perfusion.  --ID ; Left Groin infection : Continue abtx.  For debridement in am.    4/23  as above  For HD Wednesday  groin wash out today    4/24 SY  --ESRD : HD order and tx reviewed.  Tolerating tx well.  AVF cannulated without difficulty.  --Left Groin wound --Post  Muscle Flap Coverage.  Continue abtx.  --PAD : monitor perfusion.  --ID : continue Cefepime.    4/25 SY  --ESRD : Continue TIW HD  --AVF : cannulated without difficulty.  RUE edema much improved.  --Left Groin wound : post Muscle Flap Coverage.   Continue abtx.  --ID: continue abtx.  --PAD : post Left Ileofemoral endarterectomy : monitor perfusion.    4/26  seen on hd  in good spirits   stable on hd  fluid removal reduced due to low BP  hgb stable    4/27 SY  --ESRD : Continue TIW HD  --AVF : functioning well.  --PAD : Post Left Ileofemoral Endarterectomy : monitor perfusion  --Left Groin Wound : post Muscle Flap Coverage : continue abtx and wound care.    4/28 SY  --ESRD : for HD in am  --AVF : functioning well with improved cannulation.  --PAD : monitor perfusion ( Post Left Ileofemoral Endarterectomy)  --Left Groin wound : post Muscle Flap : continue abtx and wound care.    4/29 MK   - ESRD: tolerting hd, AVF cannultion well  - inc sputum production with possible endobronchial mass: dr bush input noted, may need bronch  - AMS with more confusion and jerking motion: pain meds have been limited, will get ammonia and abg value, has not been using bipap during hospitalization  - [place]: fu h/h   - left groin wound s/p muscle flap coverage with s/p left ileofemoral endarterectomy      4/30 SY  --ESRD : Continue TIW HD  --Pulm : Hemoptysis resolved.  New RML PNA and FERNANDO nodule   improved resp status . Now PNA with mucus plugging and new findings of lesion.  For outpt work up once clinically improved.  --AMS ;resolved and at baseline.  Attempt to minimize pain meds.  --PAD : Monitor Left groin and LE wound.  --ID : continue Cefepime and Daptomycin with po vanco for C diff prophylaxis.    5/1 SY  --ESRD :HD order and tx reviewed.    --Pulm : New RML PNA and FERNANDO nodule.  Pulmonary follow up appreciated.  Continue ABTX.  --AMS : resolved and stable.  --Increasing Leukocytosis : continue abtx.  D/w Dr Flanagan.  --PAD : LLE pain improved. (post Left Ileofemoral endarterectomy last admission.    5/2  Some av access arm pain last hd improved w repositioning  stable vitals  pulmonary/ thoracic surgery input noted  d/w Pt daughter    5/3  arm feels better today  for dialysis later  sacral evaluation for possible intervention    5/4 MK  - ESRD on hd: tolerating uf with hd   - Afib on AC: cardizem and titrate as needed  - Anemia: epo   - PNA with mucus plug and possible endobronchial lesion, with rising leukocytosis await timing of bronch  - rising leukocytosis with hx of cdiff: no diarrhea   LM with dr bush   bronch to be done by CTS, Dr malave, if ffp needed, will do hd with ffp in am followed by bronch ...    5/7 SY  --RML opacity : post Bronchoscopy .  Positive for endobronchial lesion.   no biopsy done.   Follow up Cytology/Cultures.  Further options to be discussed with family.  --ESRD : for HD in am.  --A FIB : Continue Cardizem for rate control and to restart a/c,  --Leukocytosis : Increasing  : Post Bronch.  Continue abtx.

## 2019-05-08 LAB
ADD ON TEST-SPECIMEN IN LAB: SIGNIFICANT CHANGE UP
ALBUMIN SERPL ELPH-MCNC: 1.8 G/DL — LOW (ref 3.3–5)
ANION GAP SERPL CALC-SCNC: 6 MMOL/L — SIGNIFICANT CHANGE UP (ref 5–17)
APTT BLD: 125.2 SEC — CRITICAL HIGH (ref 27.5–36.3)
APTT BLD: 77.2 SEC — HIGH (ref 27.5–36.3)
BUN SERPL-MCNC: 44 MG/DL — HIGH (ref 7–23)
CALCIUM SERPL-MCNC: 7.6 MG/DL — LOW (ref 8.5–10.1)
CHLORIDE SERPL-SCNC: 104 MMOL/L — SIGNIFICANT CHANGE UP (ref 96–108)
CO2 SERPL-SCNC: 26 MMOL/L — SIGNIFICANT CHANGE UP (ref 22–31)
CREAT SERPL-MCNC: 4.03 MG/DL — HIGH (ref 0.5–1.3)
GLUCOSE SERPL-MCNC: 130 MG/DL — HIGH (ref 70–99)
HCT VFR BLD CALC: 30.3 % — LOW (ref 39–50)
HGB BLD-MCNC: 9.3 G/DL — LOW (ref 13–17)
INR BLD: 1.08 RATIO — SIGNIFICANT CHANGE UP (ref 0.88–1.16)
MCHC RBC-ENTMCNC: 30.7 GM/DL — LOW (ref 32–36)
MCHC RBC-ENTMCNC: 32 PG — SIGNIFICANT CHANGE UP (ref 27–34)
MCV RBC AUTO: 104.1 FL — HIGH (ref 80–100)
NRBC # BLD: 0 /100 WBCS — SIGNIFICANT CHANGE UP (ref 0–0)
PHOSPHATE SERPL-MCNC: 4.3 MG/DL — SIGNIFICANT CHANGE UP (ref 2.5–4.5)
PLATELET # BLD AUTO: 266 K/UL — SIGNIFICANT CHANGE UP (ref 150–400)
POTASSIUM SERPL-MCNC: 4.3 MMOL/L — SIGNIFICANT CHANGE UP (ref 3.5–5.3)
POTASSIUM SERPL-SCNC: 4.3 MMOL/L — SIGNIFICANT CHANGE UP (ref 3.5–5.3)
PROTHROM AB SERPL-ACNC: 12 SEC — SIGNIFICANT CHANGE UP (ref 10–12.9)
RBC # BLD: 2.91 M/UL — LOW (ref 4.2–5.8)
RBC # FLD: 21.7 % — HIGH (ref 10.3–14.5)
SODIUM SERPL-SCNC: 136 MMOL/L — SIGNIFICANT CHANGE UP (ref 135–145)
SURGICAL PATHOLOGY STUDY: SIGNIFICANT CHANGE UP
WBC # BLD: 22.43 K/UL — HIGH (ref 3.8–10.5)
WBC # FLD AUTO: 22.43 K/UL — HIGH (ref 3.8–10.5)

## 2019-05-08 PROCEDURE — 99232 SBSQ HOSP IP/OBS MODERATE 35: CPT

## 2019-05-08 RX ORDER — WARFARIN SODIUM 2.5 MG/1
5 TABLET ORAL DAILY
Qty: 0 | Refills: 0 | Status: COMPLETED | OUTPATIENT
Start: 2019-05-08 | End: 2019-05-10

## 2019-05-08 RX ORDER — HEPARIN SODIUM 5000 [USP'U]/ML
5000 INJECTION INTRAVENOUS; SUBCUTANEOUS EVERY 8 HOURS
Qty: 0 | Refills: 0 | Status: DISCONTINUED | OUTPATIENT
Start: 2019-05-08 | End: 2019-05-14

## 2019-05-08 RX ORDER — PANTOPRAZOLE SODIUM 20 MG/1
40 TABLET, DELAYED RELEASE ORAL
Qty: 0 | Refills: 0 | Status: DISCONTINUED | OUTPATIENT
Start: 2019-05-08 | End: 2019-05-10

## 2019-05-08 RX ADMIN — LIDOCAINE AND PRILOCAINE CREAM 1 APPLICATION(S): 25; 25 CREAM TOPICAL at 05:48

## 2019-05-08 RX ADMIN — CHOLESTYRAMINE 4 GRAM(S): 4 POWDER, FOR SUSPENSION ORAL at 16:40

## 2019-05-08 RX ADMIN — SEVELAMER CARBONATE 800 MILLIGRAM(S): 2400 POWDER, FOR SUSPENSION ORAL at 13:25

## 2019-05-08 RX ADMIN — Medication 60 MILLIGRAM(S): at 23:43

## 2019-05-08 RX ADMIN — HEPARIN SODIUM 5000 UNIT(S): 5000 INJECTION INTRAVENOUS; SUBCUTANEOUS at 21:19

## 2019-05-08 RX ADMIN — SEVELAMER CARBONATE 800 MILLIGRAM(S): 2400 POWDER, FOR SUSPENSION ORAL at 05:48

## 2019-05-08 RX ADMIN — HEPARIN SODIUM 1100 UNIT(S)/HR: 5000 INJECTION INTRAVENOUS; SUBCUTANEOUS at 05:26

## 2019-05-08 RX ADMIN — SEVELAMER CARBONATE 800 MILLIGRAM(S): 2400 POWDER, FOR SUSPENSION ORAL at 18:35

## 2019-05-08 RX ADMIN — OXYCODONE AND ACETAMINOPHEN 1 TABLET(S): 5; 325 TABLET ORAL at 16:39

## 2019-05-08 RX ADMIN — Medication 1 DROP(S): at 21:19

## 2019-05-08 RX ADMIN — WARFARIN SODIUM 5 MILLIGRAM(S): 2.5 TABLET ORAL at 21:20

## 2019-05-08 RX ADMIN — Medication 1 APPLICATION(S): at 16:41

## 2019-05-08 RX ADMIN — Medication 1 DROP(S): at 05:47

## 2019-05-08 RX ADMIN — FINASTERIDE 5 MILLIGRAM(S): 5 TABLET, FILM COATED ORAL at 13:25

## 2019-05-08 RX ADMIN — ATORVASTATIN CALCIUM 20 MILLIGRAM(S): 80 TABLET, FILM COATED ORAL at 21:20

## 2019-05-08 RX ADMIN — Medication 60 MILLIGRAM(S): at 20:01

## 2019-05-08 RX ADMIN — DOXERCALCIFEROL 1 MICROGRAM(S): 2.5 CAPSULE ORAL at 10:54

## 2019-05-08 RX ADMIN — Medication 60 MILLIGRAM(S): at 13:24

## 2019-05-08 RX ADMIN — PANTOPRAZOLE SODIUM 40 MILLIGRAM(S): 20 TABLET, DELAYED RELEASE ORAL at 05:48

## 2019-05-08 RX ADMIN — HEPARIN SODIUM 1100 UNIT(S)/HR: 5000 INJECTION INTRAVENOUS; SUBCUTANEOUS at 11:33

## 2019-05-08 RX ADMIN — HEPARIN SODIUM 5000 UNIT(S): 5000 INJECTION INTRAVENOUS; SUBCUTANEOUS at 16:38

## 2019-05-08 RX ADMIN — ERYTHROPOIETIN 10000 UNIT(S): 10000 INJECTION, SOLUTION INTRAVENOUS; SUBCUTANEOUS at 10:53

## 2019-05-08 RX ADMIN — GABAPENTIN 300 MILLIGRAM(S): 400 CAPSULE ORAL at 13:25

## 2019-05-08 RX ADMIN — PANTOPRAZOLE SODIUM 40 MILLIGRAM(S): 20 TABLET, DELAYED RELEASE ORAL at 18:35

## 2019-05-08 NOTE — PROGRESS NOTE ADULT - SUBJECTIVE AND OBJECTIVE BOX
NEPHROLOGY INTERVAL HPI/OVERNIGHT EVENTS:    Date of Service: 05-08-19 @ 10:30  5/8 SY  No acute events.  Reports feeling fair.  No new complaints.  Denies SOB.  understands d/w Dr Maxwell.    5/7 SY  Post Bronchoscopy last night.  No complications.  Feeling well.  Complains of sacral decub wound.    5/3  up in chair  has pain on buttocks will be evaluated by nursing for breakdown  for dialysis today     5/2  thoracic surgery/ pulmonary input noted   d/w Pts daughter  tolerating dialysis sessions    5/1 SY  Feeling well today.  No distress noted.    HPI:  83 y/o male with ESRD on HD (MWF), HTN, RA on MTX and steroids, R leg amputation, severe CDI and megacolon, Severe PAD s/p LLE endarterectomy 2 weeks ago with dr dugan presents with T 100 and DC from L groin.  Pt also c/o R arm pain/swelling where pt had fistulogram of the AC fistula on last admission.  RUL dopplers negative for DVT.  L groin sono--negative for pseudo aneurysm and + hematoma.  Pt given 2.1 L IVF, IV vanco/aztreonam in ED  On my exam in HD suite, awake, chronically ill appearing, NAD, daughter at bedside. Pt seen by Dr dugan in ED.  CXR--clear    TTE (10/18)--mild-mod MR/EF 60%    Pt d/w daughter regarding advance directives--Pt is FULL RESUSCITATION (19 Apr 2019 16:12)    MEDICATIONS  (STANDING):  allopurinol 100 milliGRAM(s) Oral <User Schedule>  artificial tears (preservative free) Ophthalmic Solution 1 Drop(s) Both EYES three times a day  atorvastatin 20 milliGRAM(s) Oral at bedtime  cholestyramine Powder (Sugar-Free) 4 Gram(s) Oral daily  diltiazem    Tablet 60 milliGRAM(s) Oral four times a day  doxercalciferol Injectable 1 MICROGram(s) IV Push <User Schedule>  epoetin nelly Injectable 72060 Unit(s) IV Push <User Schedule>  finasteride 5 milliGRAM(s) Oral daily  gabapentin 300 milliGRAM(s) Oral daily  heparin  Infusion.  Unit(s)/Hr (13 mL/Hr) IV Continuous <Continuous>  lidocaine/prilocaine Cream 1 Application(s) Topical daily  pantoprazole  Injectable 40 milliGRAM(s) IV Push two times a day  predniSONE   Tablet 5 milliGRAM(s) Oral every other day  predniSONE   Tablet 2.5 milliGRAM(s) Oral every other day  sevelamer carbonate 800 milliGRAM(s) Oral three times a day with meals  silver sulfADIAZINE 1% Cream 1 Application(s) Topical daily  warfarin 3 milliGRAM(s) Oral daily    MEDICATIONS  (PRN):  acetaminophen   Tablet .. 650 milliGRAM(s) Oral every 6 hours PRN Temp greater or equal to 38.5C (101.3F)  acetaminophen   Tablet .. 650 milliGRAM(s) Oral every 6 hours PRN Mild Pain (1 - 3)  ALBUTerol    90 MICROgram(s) HFA Inhaler 2 Puff(s) Inhalation every 6 hours PRN Shortness of Breath and/or Wheezing  guaiFENesin   Syrup  (Sugar-Free) 100 milliGRAM(s) Oral every 6 hours PRN Cough  oxyCODONE    5 mG/acetaminophen 325 mG 1 Tablet(s) Oral every 4 hours PRN Moderate Pain (4 - 6)          Vital Signs Last 24 Hrs  T(C): 36.6 (08 May 2019 08:15), Max: 36.6 (07 May 2019 17:42)  T(F): 97.8 (08 May 2019 08:15), Max: 97.9 (07 May 2019 17:42)  HR: 62 (08 May 2019 10:06) (57 - 89)  BP: 128/35 (08 May 2019 10:06) (123/42 - 153/40)  BP(mean): --  RR: 16 (08 May 2019 10:06) (16 - 18)  SpO2: 99% (08 May 2019 04:39) (97% - 99%)    PHYSICAL EXAM:  Alert and comfortable  GENERAL: No distress  CHEST/LUNG: Minimal right base rhonchi  HEART: S1S2 RRR  ABDOMEN: soft  EXTREMITIES: no edema  SKIN:     LABS:                        9.3    22.43 )-----------( 266      ( 08 May 2019 04:46 )             30.3     05-08    136  |  104  |  44<H>  ----------------------------<  130<H>  4.3   |  26  |  4.03<H>    Ca    7.6<L>      08 May 2019 04:46      PT/INR - ( 08 May 2019 04:46 )   PT: 12.0 sec;   INR: 1.08 ratio         PTT - ( 08 May 2019 04:46 )  PTT:125.2 sec            RADIOLOGY & ADDITIONAL TESTS:

## 2019-05-08 NOTE — PROGRESS NOTE ADULT - ASSESSMENT
83 y/o male previous smoker with ESRD on HD (MWF), CHF, A fib, CAD (stent), COPD (home O2), SHAYY (BIPAP), HTN, HLD, RA (on MTX and steroids), gout, GERD, C diff, megacolon,  s/p R AKA, severe PAD s/p LLE endarterectomy (4/1), s/p Exploration left groin wound, and muscle flap closure (4/23) for wound infection; presents with chronic cough and intermittent hemoptysis, found to have RML opacity likely representing an endobronchial mass and a 1.4 cm FERNANDO opacity on CT Chest. S/P bronchoscopy 5/6/19.     PLAN  Neuro: Pain management  Pulm: Encourage coughing, deep breathing and use of incentive spirometry. Will follow up pathology as outpatient and outpatient CT Chest as per pulm.   Cardio: Monitor telemetry/alarms. Full AC for A fib.   GI: Tolerating diet.   Renal: HD as per renal.   Vasc: Heparin SC/SCDs for DVT prophylaxis  Heme: Stable H/H.  ID: Off antibiotics. Stable.  Therapy: OOB/ambulate      Hannah BETANCOURT  Thoracic Surgery   #[id number]

## 2019-05-08 NOTE — PROVIDER CONTACT NOTE (CRITICAL VALUE NOTIFICATION) - ACTION/TREATMENT ORDERED:
Pt already on Daptomycin and report states results susceptible to Daptomycin, no new orders per dr amador
Heparin gtt decreased by 2ml/hr. Rate is now 11ml/hr.
message left on MD Hernandez voice mail.  Dr. Salinas returned call and aware of all mentioned above no repeat ABG to be done, will continue to monitor pt. alivia
Roslyn delivered this infor to Md Schultz
no new orders.

## 2019-05-08 NOTE — PROGRESS NOTE ADULT - SUBJECTIVE AND OBJECTIVE BOX
Patient is a 84y old  Male who presents with a chief complaint of L groin DC (28 Apr 2019 17:14)      HPI:  83 y/o male with ESRD on HD (MWF), HTN, RA on MTX and steroids, R leg amputation, severe CDI and megacolon, Severe PAD s/p LLE endarterectomy 2 weeks ago with dr dugan presents with T 100 and DC from L groin.  Pt also c/o R arm pain/swelling where pt had fistulogram of the AC fistula on last admission.  RUL dopplers negative for DVT.  L groin sono--negative for pseudo aneurysm and + hematoma.  Pt given 2.1 L IVF, IV vanco/aztreonam in ED  On my exam in HD suite, awake, chronically ill appearing, NAD, daughter at bedside. Pt seen by Dr dugan in ED.  CXR--clear    TTE (10/18)--mild-mod MR/EF 60%    Pt d/w daughter regarding advance directives--Pt is FULL RESUSCITATION (19 Apr 2019 16:12)  events noted while admitted muscle flap and plastic surgery care developed increased cough with possible bloody streaked sputuim / none present now and RN at bedside, both noticed thhick yellow sputum production    4/30  data discussed with RN at bedside  no cp  improved breathing    5/1  feels the same  decreased cough  DTR is contacted and data discussed regarding abnormal ct scan findings  5/2  data discussed with pt's DTR and medical team as well  although BRONCH is appropriate given ct scan findings, it can not be done yet till INR corrected specially in pt with ESRD.  DATA DISCUSSED WITH DR JORGE MASSEY AS WELL for thoracic surgery consult  5/3  data discussed with thoracic surgery yesterday  discussed with dr Rashid today  pt is resting comfortably  no resp distress  no hemoptysis    5/4  No acute events occurred overnight  Discussed with hospitalist/resident team and daughter     5/5  INR still elevated   No acute pulmonary events occurred overnight  5/6 uneventful night  in dialysis  bronch postponed till INR corrected  5/7  s/p BRONCH without bx yesterday afternoon  tolerated well  POSITIVE ENDOBRONCHIAL LESION RML  no hemoptysis this am  feels ok  5/8  Bronch findings discussed with pt  plan rev with thoracic surgery with plan for fu imaging in 2-3 months  no active sxs and no reported hemoptysis  pt is not a good surgical candidate at this time    MEDICATIONS  (STANDING):  allopurinol 100 milliGRAM(s) Oral <User Schedule>  artificial tears (preservative free) Ophthalmic Solution 1 Drop(s) Both EYES three times a day  atorvastatin 20 milliGRAM(s) Oral at bedtime  cholestyramine Powder (Sugar-Free) 4 Gram(s) Oral daily  diltiazem    Tablet 60 milliGRAM(s) Oral four times a day  doxercalciferol Injectable 1 MICROGram(s) IV Push <User Schedule>  epoetin nelly Injectable 10264 Unit(s) IV Push <User Schedule>  finasteride 5 milliGRAM(s) Oral daily  gabapentin 300 milliGRAM(s) Oral daily  hydrocortisone sodium succinate Injectable 100 milliGRAM(s) IV Push every 8 hours  lidocaine/prilocaine Cream 1 Application(s) Topical daily  pantoprazole    Tablet 40 milliGRAM(s) Oral before breakfast  predniSONE   Tablet 5 milliGRAM(s) Oral every other day  predniSONE   Tablet 2.5 milliGRAM(s) Oral every other day  sevelamer carbonate 800 milliGRAM(s) Oral three times a day with meals  silver sulfADIAZINE 1% Cream 1 Application(s) Topical daily    MEDICATIONS  (PRN):  acetaminophen   Tablet .. 650 milliGRAM(s) Oral every 6 hours PRN Temp greater or equal to 38.5C (101.3F)  acetaminophen   Tablet .. 650 milliGRAM(s) Oral every 6 hours PRN Mild Pain (1 - 3)  ALBUTerol    90 MICROgram(s) HFA Inhaler 2 Puff(s) Inhalation every 6 hours PRN Shortness of Breath and/or Wheezing  guaiFENesin   Syrup  (Sugar-Free) 100 milliGRAM(s) Oral every 6 hours PRN Cough  oxyCODONE    5 mG/acetaminophen 325 mG 1 Tablet(s) Oral every 4 hours PRN Moderate Pain (4 - 6)    Vital Signs Last 24 Hrs  T(C): 36.5 (07 May 2019 05:40), Max: 37.3 (06 May 2019 16:46)  T(F): 97.7 (07 May 2019 05:40), Max: 99.1 (06 May 2019 16:46)  HR: 63 (07 May 2019 05:40) (63 - 179)  BP: 133/48 (07 May 2019 05:40) (107/61 - 167/43)  BP(mean): --  RR: 17 (07 May 2019 05:40) (14 - 20)  SpO2: 94% (07 May 2019 05:40) (89% - 99%)  PHYSICAL EXAM    HEENT: PERRL, conjunctiva clear, EOM's intact, non injected pharynx, no exudate, TM   normal  Neck: Supple, , no adenopathy, thyroid: not enlarged, no carotid bruit or JVD  Back: Symmetric, no  tenderness,no soft tissue tenderness  Lungs: Clear to auscultation bilateral,no adventitious breath sounds, normal   expiratory phase  Heart: Regular rate and rhythm, S1, S2 normal, no murmur, rub or gallop  Abdomen: Soft, non-tender, bowel sounds active , no hepatosplenomegaly  Extremities: no cyanosis or edema, no joint swelling, hx AKA right side  Skin: Skin color, texture normal, no rashes   Neurologic: Alert and oriented X3 , cranial nerves intact, sensory and motor normal,    ECG:    LABS:                                 10.7   20.09 )-----------( 361      ( 06 May 2019 07:45 )             34.9   05-06    137  |  105  |  48<H>  ----------------------------<  90  4.3   |  23  |  4.78<H>    Ca    8.1<L>      06 May 2019 07:45  Phos  4.1     05-06    TPro  x   /  Alb  2.0<L>  /  TBili  x   /  DBili  x   /  AST  x   /  ALT  x   /  AlkPhos  x   05-06               9.8    16.81 )-----------( 443      ( 01 May 2019 06:35 )             32.1   PT/INR - ( 06 May 2019 07:45 )   PT: 19.4 sec;   INR: 1.72 ratio                                 9.9    13.16 )-----------( 358      ( 30 Apr 2019 06:08 )             32.1                           10.1   7.96  )-----------( 334      ( 29 Apr 2019 07:08 )             33.2     04-29    04-30    142  |  111<H>  |  19  ----------------------------<  87  3.8   |  24  |  2.70<H>    Ca    8.3<L>      30 Apr 2019 06:08  Phos  3.4     04-29    TPro  x   /  Alb  1.8<L>  /  TBili  x   /  DBili  x   /  AST  x   /  ALT  x   /  AlkPhos  x   04-29  138  |  108  |  35<H>  ----------------------------<  95  4.4   |  22  |  4.61<H>    Ca    8.0<L>      29 Apr 2019 07:08              PT/INR - ( 29 Apr 2019 07:08 )   PT: 39.9 sec;   INR: 3.46 ratio                   RADIOLOGY & ADDITIONAL STUDIES:  < from: CT Chest No Cont (04.28.19 @ 12:17) >  PROCEDURE:   CT of the Chest was performed without intravenous contrast.  Sagittal and coronal reformats were performed.    FINDINGS:    LUNGS AND LARGE AIRWAYS: Emphysema. Continued increase size of tubular   oblong opacity in the right middle lobe, possibly endobronchial mass with   impacted airways. Possible associated broncholiths. Irregular opacity in   the posterior left upper lobe (series 3, image 60), measuring 1.4 cm.  PLEURA: Small left pleural effusion. Trace right pleural effusion.  VESSELS: Atherosclerotic calcifications.   HEART: Heart size is normal. No pericardial effusion.  MEDIASTINUM AND [place]: No lymphadenopathy.  CHEST WALL AND LOWER NECK: Left thyroid nodule, measuring 2.9 cm.   Extension of the thyroid gland into the superior mediastinum.   VISUALIZED UPPER ABDOMEN: Cholelithiasis. Unchanged probable cyst in   hepatic segment 8.  BONES: Degenerative changes of the spine.    IMPRESSION:   Continued increase size of tubular oblong opacity in the right middle   lobe, possibly endobronchial mass with impacted airways. Irregular   opacity in the posterior left upper lobe, measuring 1.4 cm. PET/CT is   recommended for further evaluation.    Left thyroid nodule, measuring 2.9 cm. Further evaluation with thyroid   ultrasound is recommended.    < end of copied text >

## 2019-05-08 NOTE — PROGRESS NOTE ADULT - ASSESSMENT
83 y/o male with ESRD on HD (MWF), HTN, RA on MTX and steroids, R leg amputation, severe CDI and megacolon, Severe PAD s/p LLE endarterectomy 2 weeks ago with dr dugan presents with T 100 and DC from L groin.  Pt also c/o R arm pain/swelling where pt had fistulogram of the AC fistula on last admission.  RUL dopplers negative for DVT.  L groin sono--negative for pseudo aneurysm and + hematoma.  Pt given 2.1 L IVF, IV vanco/aztreonam in ED  On my exam in HD suite, awake, chronically ill appearing, NAD, daughter at bedside. Pt seen by Dr dugan in ED.  CXR--clear    4/20  s/p hd yesterday  prolonged bleed from access stopped w pressure  feels well  arm less tender  received 1 u prbc on hd yesterday  monitor cbc    4/22 SY  --ESRD : HD order and tx reviewed.   Tolerating tx well.  --AVF : as above.  Prolonged bleeding post tx.  Venous pressure acceptable today.  Monitor AVF function.  --Anemia : with acute loss.  Active infection leading to LETICIA hyporesponsiveness.  Transfuse 2 units today.  --PAD : post Left Ileofemoral Endarterectomy : Monitor Left foot perfusion.  --ID ; Left Groin infection : Continue abtx.  For debridement in am.    4/23  as above  For HD Wednesday  groin wash out today    4/24 SY  --ESRD : HD order and tx reviewed.  Tolerating tx well.  AVF cannulated without difficulty.  --Left Groin wound --Post  Muscle Flap Coverage.  Continue abtx.  --PAD : monitor perfusion.  --ID : continue Cefepime.    4/25 SY  --ESRD : Continue TIW HD  --AVF : cannulated without difficulty.  RUE edema much improved.  --Left Groin wound : post Muscle Flap Coverage.   Continue abtx.  --ID: continue abtx.  --PAD : post Left Ileofemoral endarterectomy : monitor perfusion.    4/26  seen on hd  in good spirits   stable on hd  fluid removal reduced due to low BP  hgb stable    4/27 SY  --ESRD : Continue TIW HD  --AVF : functioning well.  --PAD : Post Left Ileofemoral Endarterectomy : monitor perfusion  --Left Groin Wound : post Muscle Flap Coverage : continue abtx and wound care.    4/28 SY  --ESRD : for HD in am  --AVF : functioning well with improved cannulation.  --PAD : monitor perfusion ( Post Left Ileofemoral Endarterectomy)  --Left Groin wound : post Muscle Flap : continue abtx and wound care.    4/29 MK   - ESRD: tolerting hd, AVF cannultion well  - inc sputum production with possible endobronchial mass: dr bush input noted, may need bronch  - AMS with more confusion and jerking motion: pain meds have been limited, will get ammonia and abg value, has not been using bipap during hospitalization  - [place]: fu h/h   - left groin wound s/p muscle flap coverage with s/p left ileofemoral endarterectomy      4/30 SY  --ESRD : Continue TIW HD  --Pulm : Hemoptysis resolved.  New RML PNA and FERNANDO nodule   improved resp status . Now PNA with mucus plugging and new findings of lesion.  For outpt work up once clinically improved.  --AMS ;resolved and at baseline.  Attempt to minimize pain meds.  --PAD : Monitor Left groin and LE wound.  --ID : continue Cefepime and Daptomycin with po vanco for C diff prophylaxis.    5/1 SY  --ESRD :HD order and tx reviewed.    --Pulm : New RML PNA and FERNANDO nodule.  Pulmonary follow up appreciated.  Continue ABTX.  --AMS : resolved and stable.  --Increasing Leukocytosis : continue abtx.  D/w Dr Flanagan.  --PAD : LLE pain improved. (post Left Ileofemoral endarterectomy last admission.    5/2  Some av access arm pain last hd improved w repositioning  stable vitals  pulmonary/ thoracic surgery input noted  d/w Pt daughter    5/3  arm feels better today  for dialysis later  sacral evaluation for possible intervention    5/4 MK  - ESRD on hd: tolerating uf with hd   - Afib on AC: cardizem and titrate as needed  - Anemia: epo   - PNA with mucus plug and possible endobronchial lesion, with rising leukocytosis await timing of bronch  - rising leukocytosis with hx of cdiff: no diarrhea   LM with dr bush   bronch to be done by CTS, Dr malave, if ffp needed, will do hd with ffp in am followed by bronch ...    5/7 SY  --RML opacity : post Bronchoscopy .  Positive for endobronchial lesion.   no biopsy done.   Follow up Cytology/Cultures.  Further options to be discussed with family.  --ESRD : for HD in am.  --A FIB : Continue Cardizem for rate control and to restart a/c,  --Leukocytosis : Increasing  : Post Bronch.  Continue abtx.    5/8 SY  --ESRD : HD order and tx reviewed.  Tolerating tx well.  --Pulmonary : Positive Endobronchial lesion.  No further intervention at present and follow up as outpt,  --A FIB : continue Cardizem.  Restarted on A/C.  --Leukocytosis ;  Slightly improved. Off all abtx since 5/2.  Remains afebrile.

## 2019-05-08 NOTE — PROGRESS NOTE ADULT - ASSESSMENT
83 y/o male with ESRD on HD (MWF), HTN, RA on MTX and steroids, R leg amputation, severe CDI and megacolon, Severe PAD s/p LLE endarterectomy 2 weeks ago with dr dugan presents with T 100 and DC from L groin.  Pt also c/o R arm pain/swelling where pt had fistulogram of the AC fistula on last admission.  RUL dopplers negative for DVT.  L groin sono--negative for pseudo aneurysm and + hematoma.  Pt given 2.1 L IVF, IV vanco/aztreonam in ED  On my exam in HD suite, awake, chronically ill appearing, NAD, daughter at bedside. Pt seen by Dr dugan in ED.  CXR--clear    TTE (10/18)--mild-mod MR/EF 60%    Pt d/w daughter regarding advance directives--Pt is FULL RESUSCITATION (19 Apr 2019 16:12)  events noted while admitted muscle flap and plastic surgery care developed increased cough with possible bloody streaked sputuim / none present now and RN at bedside, both noticed thhick yellow sputum production  ct scan findings personally reveiwed / old films are not available on PACS at this time for comparison  Continued increase size of tubular oblong opacity in the right middle   lobe, possibly endobronchial mass with impacted airways. Irregular   opacity in the posterior left upper lobe, measuring 1.4 cm    Assessment / plan:  suspected pneumonia with mucus plugging RML  BRONCH with positive endobronchial lesion RML / possible carcinoid tumor  separate FERNANDO pulm nodule seen needs further eval  after antibiotic therapy  ESRD on dilaysis now  Sleep apnea, obstructive: Requires home O2 therapy, and treatment with BIPAP  bibasilar atelectasis with associated small effusions  Prior films reviewed with ct scan abd/pelvis done 7/2018 and 10/2018 with similar RLL findings NOW increased in size which raises suspicion for malignancy higher with this finding with possible endobronchial mass also present / all discussed with his DTR     Appreciate CTS recommendations   s/p BRONCH without bx done 5/6, tolerated well  POSITIVE ENDOBRONCHIAL LESION RML  no hemoptysis this am  fu BAL and cytology / will discuss further with thoracic surgery and family regarding data/results    5/8  Bronch findings discussed with pt  plan rev with thoracic surgery yesterday- spoke with Dr Hall with plan for fu imaging in 2-3 months  no active sxs and no reported hemoptysis  pt is not a good surgical candidate at this time

## 2019-05-08 NOTE — PROGRESS NOTE ADULT - SUBJECTIVE AND OBJECTIVE BOX
HPI: 83 y/o male with ESRD on HD (MWF), HTN, RA on MTX and steroids, R leg amputation, severe CDI and megacolon, Severe PAD s/p LLE endarterectomy (4/1/19)with dr Clement presents with T 100.5 and DC from L groin.  Pt also c/o R arm pain/swelling where pt had fistulogram of the AC fistula on last admission.    5/8: Pt was seen and examined. Pt is receiving HD today and tolerating. Pt overnight had elevated aPTT. Pt hep gtt rate was halved. Pt this am has 1 episode of bloody BM. Pt was restarted on coumadin and hep gtt yesterday.   Left a message and call back number for cardio for possibly holding his coumadin +/- hep gtt in the setting of bleeding with subtherapeutic INR. Awaiting response.    REVIEW OF SYSTEMS:  CONSTITUTIONAL: No weakness, fevers or chills  EYES/ENT: No visual changes;  No vertigo or throat pain   NECK: No pain or stiffness  RESPIRATORY: + cough. No wheezing, hemoptysis; No shortness of breath  CARDIOVASCULAR: No chest pain or palpitations  GASTROINTESTINAL: No abdominal or epigastric pain. No nausea, vomiting, or hematemesis; No diarrhea or constipation. No melena or hematochezia.  GENITOURINARY: No dysuria, frequency or hematuria  NEUROLOGICAL: No numbness or weakness  SKIN: Left ankle and thigh wound.   All other review of systems is negative unless indicated above    Vital Signs Last 24 Hrs  T(C): 36.6 (08 May 2019 08:15), Max: 36.6 (07 May 2019 17:42)  T(F): 97.8 (08 May 2019 08:15), Max: 97.9 (07 May 2019 17:42)  HR: 63 (08 May 2019 10:55) (57 - 89)  BP: 131/34 (08 May 2019 10:55) (123/42 - 153/40)  BP(mean): --  RR: 16 (08 May 2019 10:55) (16 - 18)  SpO2: 99% (08 May 2019 04:39) (97% - 99%)    I&O's Summary      CAPILLARY BLOOD GLUCOSE          PHYSICAL EXAM:  Constitutional: NAD, awake and alert, well-developed  HEENT: PERR, EOMI, Normal Hearing, MMM  Neck: Soft and supple, No LAD, No JVD  Respiratory: Breath sounds are clear bilaterally, No wheezing, rales or rhonchi  Cardiovascular: S1 and S2, regular rate and rhythm, no Murmurs, gallops or rubs  Gastrointestinal: Bowel Sounds present, soft, nontender, nondistended, no guarding, no rebound  Extremities: No peripheral edema  Vascular: 2+ peripheral pulses  Neurological: A/O x 3, no focal deficits  Musculoskeletal: 5/5 strength b/l upper and lower extremities  Skin: No rashes    MEDICATIONS:  MEDICATIONS  (STANDING):  allopurinol 100 milliGRAM(s) Oral <User Schedule>  artificial tears (preservative free) Ophthalmic Solution 1 Drop(s) Both EYES three times a day  atorvastatin 20 milliGRAM(s) Oral at bedtime  cholestyramine Powder (Sugar-Free) 4 Gram(s) Oral daily  diltiazem    Tablet 60 milliGRAM(s) Oral four times a day  doxercalciferol Injectable 1 MICROGram(s) IV Push <User Schedule>  epoetin nelly Injectable 49238 Unit(s) IV Push <User Schedule>  finasteride 5 milliGRAM(s) Oral daily  gabapentin 300 milliGRAM(s) Oral daily  heparin  Infusion.  Unit(s)/Hr (13 mL/Hr) IV Continuous <Continuous>  lidocaine/prilocaine Cream 1 Application(s) Topical daily  pantoprazole  Injectable 40 milliGRAM(s) IV Push two times a day  predniSONE   Tablet 5 milliGRAM(s) Oral every other day  predniSONE   Tablet 2.5 milliGRAM(s) Oral every other day  sevelamer carbonate 800 milliGRAM(s) Oral three times a day with meals  silver sulfADIAZINE 1% Cream 1 Application(s) Topical daily  warfarin 3 milliGRAM(s) Oral daily      LABS: All Labs Reviewed:                        9.3    22.43 )-----------( 266      ( 08 May 2019 04:46 )             30.3     05-08    136  |  104  |  44<H>  ----------------------------<  130<H>  4.3   |  26  |  4.03<H>    Ca    7.6<L>      08 May 2019 04:46  Phos  4.3     05-08    TPro  x   /  Alb  1.8<L>  /  TBili  x   /  DBili  x   /  AST  x   /  ALT  x   /  AlkPhos  x   05-08    PT/INR - ( 08 May 2019 04:46 )   PT: 12.0 sec;   INR: 1.08 ratio         PTT - ( 08 May 2019 04:46 )  PTT:125.2 sec      Blood Culture: 05-06 @ 19:49  Organism --  Gram Stain Blood -- Gram Stain   No polymorphonuclear cells seen per low power field  No squamous epithelial cells per low power field  No organisms seen per oil power field  Specimen Source Bronch Wash right lung middle lobe washings for culture  Culture-Blood --        RADIOLOGY/EKG:    DVT PPX:    ADVANCED DIRECTIVE:    DISPOSITION: HPI: 85 y/o male with ESRD on HD (MWF), HTN, RA on MTX and steroids, R leg amputation, severe CDI and megacolon, Severe PAD s/p LLE endarterectomy (4/1/19)with dr Clement presents with T 100.5 and DC from L groin.  Pt also c/o R arm pain/swelling where pt had fistulogram of the AC fistula on last admission.    5/8: Pt was seen and examined. Pt is receiving HD today and tolerating. Pt overnight had elevated aPTT. Pt hep gtt rate was halved. Pt this am has 1 episode of bloody BM which has currently resolved. Pt was restarted on coumadin and hep gtt yesterday.   Left a message and call back number for cardio for possibly holding his coumadin +/- hep gtt in the setting of bleeding with subtherapeutic INR. Awaiting response.  Pt complained of pain in his buttock. Examined with RN and noted dermatitis. Spoke with Ananya Alvarado (Wound care) and they are already aware and have been managing with silvadene cream.     REVIEW OF SYSTEMS:  CONSTITUTIONAL: No weakness, fevers or chills  EYES/ENT: No visual changes;  No vertigo or throat pain   NECK: No pain or stiffness  RESPIRATORY: No cough, wheezing, hemoptysis; No shortness of breath  CARDIOVASCULAR: No chest pain or palpitations  GASTROINTESTINAL: No abdominal or epigastric pain. No nausea, vomiting, or hematemesis; No diarrhea or constipation. No melena or hematochezia.  GENITOURINARY: No dysuria, frequency or hematuria  NEUROLOGICAL: No numbness or weakness  SKIN: Left ankle and thigh wound.   All other review of systems is negative unless indicated above    Vital Signs Last 24 Hrs  T(C): 36.6 (08 May 2019 08:15), Max: 36.6 (07 May 2019 17:42)  T(F): 97.8 (08 May 2019 08:15), Max: 97.9 (07 May 2019 17:42)  HR: 63 (08 May 2019 10:55) (57 - 89)  BP: 131/34 (08 May 2019 10:55) (123/42 - 153/40)  RR: 16 (08 May 2019 10:55) (16 - 18)  SpO2: 99% (08 May 2019 04:39) (97% - 99%)      PHYSICAL EXAM:  Constitutional: NAD, awake and alert, well-developed  HEENT: PERR, EOMI, Normal Hearing, MMM  Neck: Soft and supple, No LAD, No JVD  Respiratory: Diminished breath sounds on the right  Cardiovascular: S1 and S2, regular rate and rhythm, no Murmurs, gallops or rubs  Gastrointestinal: Bowel Sounds present, soft, nontender, nondistended, no guarding, no rebound  Extremities: No peripheral edema. R BKA, Left surgical site C/D/I. Left ankle wound not draining. IAD noted at buttocks.   Vascular: 2+ peripheral pulses  Neurological: A/O x 3, no focal deficits  Musculoskeletal: 5/5 strength b/l upper and lower extremities  Skin: No rashes    MEDICATIONS:  MEDICATIONS  (STANDING):  allopurinol 100 milliGRAM(s) Oral <User Schedule>  artificial tears (preservative free) Ophthalmic Solution 1 Drop(s) Both EYES three times a day  atorvastatin 20 milliGRAM(s) Oral at bedtime  cholestyramine Powder (Sugar-Free) 4 Gram(s) Oral daily  diltiazem    Tablet 60 milliGRAM(s) Oral four times a day  doxercalciferol Injectable 1 MICROGram(s) IV Push <User Schedule>  epoetin nelly Injectable 14522 Unit(s) IV Push <User Schedule>  finasteride 5 milliGRAM(s) Oral daily  gabapentin 300 milliGRAM(s) Oral daily  heparin  Infusion.  Unit(s)/Hr (13 mL/Hr) IV Continuous <Continuous>  lidocaine/prilocaine Cream 1 Application(s) Topical daily  pantoprazole  Injectable 40 milliGRAM(s) IV Push two times a day  predniSONE   Tablet 5 milliGRAM(s) Oral every other day  predniSONE   Tablet 2.5 milliGRAM(s) Oral every other day  sevelamer carbonate 800 milliGRAM(s) Oral three times a day with meals  silver sulfADIAZINE 1% Cream 1 Application(s) Topical daily  warfarin 3 milliGRAM(s) Oral daily      LABS: All Labs Reviewed:                        9.3    22.43 )-----------( 266      ( 08 May 2019 04:46 )             30.3     05-08    136  |  104  |  44<H>  ----------------------------<  130<H>  4.3   |  26  |  4.03<H>    Ca    7.6<L>      08 May 2019 04:46  Phos  4.3     05-08    TPro  x   /  Alb  1.8<L>  /  TBili  x   /  DBili  x   /  AST  x   /  ALT  x   /  AlkPhos  x   05-08    PT/INR - ( 08 May 2019 04:46 )   PT: 12.0 sec;   INR: 1.08 ratio         PTT - ( 08 May 2019 04:46 )  PTT:125.2 sec      Blood Culture: 05-06 @ 19:49  Organism --  Gram Stain Blood -- Gram Stain   No polymorphonuclear cells seen per low power field  No squamous epithelial cells per low power field  No organisms seen per oil power field  Specimen Source Bronch Wash right lung middle lobe washings for culture  Culture-Blood --

## 2019-05-08 NOTE — PROGRESS NOTE ADULT - SUBJECTIVE AND OBJECTIVE BOX
Subjective: Patient comfortable. Daughter bedside. Discussed awaiting pathology and repeat CT scan -planning as an outpatient with pulm in a few weeks. No complaints.     Vital Signs:  Vital Signs Last 24 Hrs  T(C): 36.8 (05-08-19 @ 16:14), Max: 36.8 (05-08-19 @ 16:14)  T(F): 98.3 (05-08-19 @ 16:14), Max: 98.3 (05-08-19 @ 16:14)  HR: 71 (05-08-19 @ 16:14) (57 - 89)  BP: 124/38 (05-08-19 @ 16:14) (123/42 - 157/37)  RR: 18 (05-08-19 @ 16:14) (16 - 18)  SpO2: 98% (05-08-19 @ 16:14) (97% - 99%) on (O2)    I&O's Detail  Not recorded  HD today       Exam:  General: NAD  Neurology: Awake, Alert  Eyes: Scleras clear, PERRLA/ EOMI, Gross vision intact  ENT: Gross hearing intact, grossly patent pharynx, no stridor  Neck: Neck supple, trachea midline, No JVD  Respiratory: CTA B/L, No wheezing, rales, rhonchi  CV: RRR, S1S2, no murmurs, rubs or gallops  Abdominal: Soft, NT, ND  Extremities: No edema  Skin: L groin wound  Psych: Oriented x 3, normal affect      Relevant labs, radiology and Medications reviewed                        9.3    22.43 )-----------( 266      ( 08 May 2019 04:46 )             30.3     05-08    136  |  104  |  44<H>  ----------------------------<  130<H>  4.3   |  26  |  4.03<H>    Ca    7.6<L>      08 May 2019 04:46  Phos  4.3     05-08    TPro  x   /  Alb  1.8<L>  /  TBili  x   /  DBili  x   /  AST  x   /  ALT  x   /  AlkPhos  x   05-08    PT/INR - ( 08 May 2019 04:46 )   PT: 12.0 sec;   INR: 1.08 ratio         PTT - ( 08 May 2019 11:00 )  PTT:77.2 sec  MEDICATIONS  (STANDING):  allopurinol 100 milliGRAM(s) Oral <User Schedule>  artificial  tears Solution 1 Drop(s) Both EYES three times a day  atorvastatin 20 milliGRAM(s) Oral at bedtime  cholestyramine Powder (Sugar-Free) 4 Gram(s) Oral daily  diltiazem    Tablet 60 milliGRAM(s) Oral four times a day  doxercalciferol Injectable 1 MICROGram(s) IV Push <User Schedule>  epoetin nelly Injectable [id number] Unit(s) IV Push <User Schedule>  finasteride 5 milliGRAM(s) Oral daily  gabapentin 300 milliGRAM(s) Oral daily  heparin  Injectable 5000 Unit(s) SubCutaneous every 8 hours  lidocaine/prilocaine Cream 1 Application(s) Topical daily  pantoprazole  Injectable 40 milliGRAM(s) IV Push two times a day  predniSONE   Tablet 5 milliGRAM(s) Oral every other day  predniSONE   Tablet 2.5 milliGRAM(s) Oral every other day  sevelamer carbonate 800 milliGRAM(s) Oral three times a day with meals  silver sulfADIAZINE 1% Cream 1 Application(s) Topical daily  warfarin 5 milliGRAM(s) Oral daily    MEDICATIONS  (PRN):  acetaminophen   Tablet .. 650 milliGRAM(s) Oral every 6 hours PRN Mild Pain (1 - 3)  ALBUTerol    90 MICROgram(s) HFA Inhaler 2 Puff(s) Inhalation every 6 hours PRN Shortness of Breath and/or Wheezing  guaiFENesin   Syrup  (Sugar-Free) 100 milliGRAM(s) Oral every 6 hours PRN Cough  oxyCODONE    5 mG/acetaminophen 325 mG 1 Tablet(s) Oral every 4 hours PRN Moderate Pain (4 - 6)        Assessment  84y Male  w/ PAST MEDICAL & SURGICAL HISTORY:  Above knee amputation of right lower extremity  Recurrent Clostridium difficile diarrhea  Diastolic CHF  Peripheral vascular disease  Afib  Anemia  CKD (chronic kidney disease)  COPD (chronic obstructive pulmonary disease)  SHAYY (obstructive sleep apnea)  Sepsis, due to unspecified organism: 2/2 poorly healing wounds b/l  Dyspepsia: On moderate exertion.  Sleep apnea, obstructive: Requires home 02 therapy, and treatment with BIPAP  Atelectasis  Pleural effusion, bilateral  Respiratory failure  Peripheral edema  CRI (chronic renal insufficiency)  Gout  Benign prostatic hypertrophy  Spinal stenosis  Hypercholesterolemia  GERD (gastroesophageal reflux disease)  CAD (coronary artery disease)  Hypertension  S/P angioplasty with stent  Cataract of left eye  Prostate: Surgery green light procedure.  S/P rotator cuff surgery: Right  S/P angioplasty  admitted with complaints of Patient is a 84y old  Male who presents with a chief complaint of L groin DC (08 May 2019 11:11)  patient underwent Bronchoscopy, adult  Debridement of open wound, 20 sq cm or less

## 2019-05-08 NOTE — PROGRESS NOTE ADULT - ASSESSMENT
85 y/o male with ESRD on HD (MWF), HTN, RA on MTX and steroids, R leg amputation, severe CDI and megacolon, Severe PAD s/p LLE endarterectomy (4/1/19)with dr Clement presented with fever (100.5) and DC from L groin. Pt was noted to have lung mass increased in size.     #Surgical site infection s/p endarterectomy 4/1   - Continue daily dressing as recommended by plastic surgery and wound care team.   - Continue pain control with tylenol PRN, tramadol PRN and percocet PRN.   - ID, Vascular and Plastic surgery consult appreciated    #Lung mass likely benign, malignant, infectious  - s/p bronchoscopy 5/6  - F/u final Path results  - Repeat imaging in 2-3 months  - CT scan noted R endobronchial mass enlarging from previous CT and new L lung mass  - Pulm, CT surgery consult appreciated    #Thyroid Nodule suspicion for malignancy  - Nodule noted on US  - Repeat US in 6 months outpatient. Might need FNA outpatient    #Incontinence Associated Dermatitis  - Continue silvadene and care as per wound care    #Anemia likely ACD  - acute loss due to procedure  - Continue to monitor CBC    #Leukocytosis  - 2/2 steroid  - Monitor    #right upper extremity pain   - Resolved  - s/p R arm fistula revision with fistulogram on March 28, 2019 by Dr. Urbano CURRIE venous doppler - no evidence of DVT; markedly increased velocity in AV fistula of arm raising suspicion of hemodynamically significant stenosis  - Vascular surgery recs appreciated.     #Recurrent C. diff diarrhea with megacolon  - Monitor off abx  - ID recs appreciated    #PAF    - ChadVasc score of 5 (Age, CHF, HTN, Vasc dz)   - Continue cardizem  - Continue coumadin with hep bridging until INR therapeutic  - Daily INR    #ESRD on HD M,W,F  -continue dialysis  -Nephro recs appreciated.     #Chronic diastolic CHF   - Last echo 2018 EF of 60-65%.   - Not fluid overloaded    #PVD  - ChadVasc score of 5 (Age, CHF, HTN, Vasc dz)   - Continue coumadin with hep bridging until INR therapeutic  - Daily INR    #Prophylactic Measure  - On coumadin 83 y/o male with ESRD on HD (MWF), HTN, RA on MTX and steroids, R leg amputation, severe CDI and megacolon, Severe PAD s/p LLE endarterectomy (4/1/19)with dr Clement presented with fever (100.5) and DC from L groin. Pt was noted to have lung mass increased in size.     #Surgical site infection s/p endarterectomy 4/1   - Continue daily dressing as recommended by plastic surgery and wound care team.   - Continue pain control with tylenol PRN, tramadol PRN and percocet PRN.   - ID, Vascular and Plastic surgery consult appreciated    #Lung mass likely benign, malignant, infectious  - s/p bronchoscopy 5/6  - F/u final Path results  - Repeat imaging in 2-3 months  - CT scan noted R endobronchial mass enlarging from previous CT and new L lung mass  - Pulm, CT surgery consult appreciated    #Thyroid Nodule suspicion for malignancy  - Nodule noted on US  - Repeat US in 6 months outpatient. Might need FNA outpatient    #Incontinence Associated Dermatitis  - Continue silvadene and care as per wound care    #Anemia likely ACD  - acute loss due to procedure  - Continue to monitor CBC    #Leukocytosis  - 2/2 steroid  - Monitor    #right upper extremity pain   - Resolved  - s/p R arm fistula revision with fistulogram on March 28, 2019 by Dr. Urbano CURRIE venous doppler - no evidence of DVT; markedly increased velocity in AV fistula of arm raising suspicion of hemodynamically significant stenosis  - Vascular surgery recs appreciated.     #Recurrent C. diff diarrhea with megacolon  - Monitor off abx  - ID recs appreciated    #PAF    - CHADS2 score - less than 5   -no need for bridging, increase coumadin and monitor inr   -monitor h/h closely and have GI evaluation   -    #ESRD on HD M,W,F  -continue dialysis  -Nephro recs appreciated.     #Chronic diastolic CHF   - Last echo 2018 EF of 60-65%.   - Not fluid overloaded    #PVD  - ChadVasc score of 5 (Age, CHF, HTN, Vasc dz)   - Continue coumadin with hep bridging until INR therapeutic  - Daily INR    #Prophylactic Measure  - On coumadin 85 y/o male with ESRD on HD (MWF), HTN, RA on MTX and steroids, R leg amputation, severe CDI and megacolon, Severe PAD s/p LLE endarterectomy (4/1/19)with dr Clement presented with fever (100.5) and DC from L groin. Pt was noted to have lung mass increased in size.     #Surgical site infection s/p endarterectomy 4/1   - Continue daily dressing as recommended by plastic surgery and wound care team.   - Continue pain control with tylenol PRN, tramadol PRN and percocet PRN.   - ID, Vascular and Plastic surgery consult appreciated    #Lung mass likely benign, malignant, infectious  - s/p bronchoscopy 5/6  - F/u final Path results  - Repeat imaging in 2-3 months  - CT scan noted R endobronchial mass enlarging from previous CT and new L lung mass  - Pulm, CT surgery consult appreciated    #Thyroid Nodule suspicion for malignancy  - Nodule noted on US  - Repeat US in 6 months outpatient. Might need FNA outpatient    #Incontinence Associated Dermatitis  - Continue silvadene and care as per wound care    #Anemia likely ACD  - acute loss due to procedure  - Continue to monitor CBC    #Leukocytosis  - 2/2 steroid  - Monitor    #right upper extremity pain   - Resolved  - s/p R arm fistula revision with fistulogram on March 28, 2019 by Dr. Urbano CURRIE venous doppler - no evidence of DVT; markedly increased velocity in AV fistula of arm raising suspicion of hemodynamically significant stenosis  - Vascular surgery recs appreciated.     #Recurrent C. diff diarrhea with megacolon  - Monitor off abx  - ID recs appreciated    #PAF    - CHADS2 score - less than 5   -no need for bridging, increase coumadin and monitor inr   -monitor h/h closely and have GI evaluation     #ESRD on HD M,W,F  -continue dialysis  -Nephro recs appreciated.     #Chronic diastolic CHF   - Last echo 2018 EF of 60-65%.   - Not fluid overloaded    #PVD  - CHADS2 score - less than 5   - no need for bridging, increase coumadin and monitor inr   - Daily INR    #Prophylactic Measure  - On coumadin

## 2019-05-09 LAB
ANION GAP SERPL CALC-SCNC: 7 MMOL/L — SIGNIFICANT CHANGE UP (ref 5–17)
BUN SERPL-MCNC: 33 MG/DL — HIGH (ref 7–23)
CALCIUM SERPL-MCNC: 7.5 MG/DL — LOW (ref 8.5–10.1)
CHLORIDE SERPL-SCNC: 106 MMOL/L — SIGNIFICANT CHANGE UP (ref 96–108)
CO2 SERPL-SCNC: 27 MMOL/L — SIGNIFICANT CHANGE UP (ref 22–31)
CREAT SERPL-MCNC: 3.07 MG/DL — HIGH (ref 0.5–1.3)
CULTURE RESULTS: SIGNIFICANT CHANGE UP
GLUCOSE SERPL-MCNC: 94 MG/DL — SIGNIFICANT CHANGE UP (ref 70–99)
HCT VFR BLD CALC: 32.8 % — LOW (ref 39–50)
HGB BLD-MCNC: 9.6 G/DL — LOW (ref 13–17)
INR BLD: 1.2 RATIO — HIGH (ref 0.88–1.16)
MCHC RBC-ENTMCNC: 29.3 GM/DL — LOW (ref 32–36)
MCHC RBC-ENTMCNC: 30.8 PG — SIGNIFICANT CHANGE UP (ref 27–34)
MCV RBC AUTO: 105.1 FL — HIGH (ref 80–100)
NRBC # BLD: 0 /100 WBCS — SIGNIFICANT CHANGE UP (ref 0–0)
PLATELET # BLD AUTO: 225 K/UL — SIGNIFICANT CHANGE UP (ref 150–400)
POTASSIUM SERPL-MCNC: 4 MMOL/L — SIGNIFICANT CHANGE UP (ref 3.5–5.3)
POTASSIUM SERPL-SCNC: 4 MMOL/L — SIGNIFICANT CHANGE UP (ref 3.5–5.3)
PROTHROM AB SERPL-ACNC: 13.4 SEC — HIGH (ref 10–12.9)
RBC # BLD: 3.12 M/UL — LOW (ref 4.2–5.8)
RBC # FLD: 22.3 % — HIGH (ref 10.3–14.5)
SODIUM SERPL-SCNC: 140 MMOL/L — SIGNIFICANT CHANGE UP (ref 135–145)
SPECIMEN SOURCE: SIGNIFICANT CHANGE UP
WBC # BLD: 14.26 K/UL — HIGH (ref 3.8–10.5)
WBC # FLD AUTO: 14.26 K/UL — HIGH (ref 3.8–10.5)

## 2019-05-09 PROCEDURE — 99232 SBSQ HOSP IP/OBS MODERATE 35: CPT

## 2019-05-09 RX ADMIN — Medication 60 MILLIGRAM(S): at 17:57

## 2019-05-09 RX ADMIN — Medication 100 MILLIGRAM(S): at 13:05

## 2019-05-09 RX ADMIN — Medication 5 MILLIGRAM(S): at 13:05

## 2019-05-09 RX ADMIN — WARFARIN SODIUM 5 MILLIGRAM(S): 2.5 TABLET ORAL at 23:00

## 2019-05-09 RX ADMIN — Medication 60 MILLIGRAM(S): at 23:02

## 2019-05-09 RX ADMIN — Medication 1 DROP(S): at 22:59

## 2019-05-09 RX ADMIN — Medication 1 DROP(S): at 05:19

## 2019-05-09 RX ADMIN — CHOLESTYRAMINE 4 GRAM(S): 4 POWDER, FOR SUSPENSION ORAL at 12:50

## 2019-05-09 RX ADMIN — HEPARIN SODIUM 5000 UNIT(S): 5000 INJECTION INTRAVENOUS; SUBCUTANEOUS at 13:07

## 2019-05-09 RX ADMIN — PANTOPRAZOLE SODIUM 40 MILLIGRAM(S): 20 TABLET, DELAYED RELEASE ORAL at 05:19

## 2019-05-09 RX ADMIN — SEVELAMER CARBONATE 800 MILLIGRAM(S): 2400 POWDER, FOR SUSPENSION ORAL at 12:51

## 2019-05-09 RX ADMIN — OXYCODONE AND ACETAMINOPHEN 1 TABLET(S): 5; 325 TABLET ORAL at 05:41

## 2019-05-09 RX ADMIN — GABAPENTIN 300 MILLIGRAM(S): 400 CAPSULE ORAL at 12:50

## 2019-05-09 RX ADMIN — FINASTERIDE 5 MILLIGRAM(S): 5 TABLET, FILM COATED ORAL at 12:50

## 2019-05-09 RX ADMIN — HEPARIN SODIUM 5000 UNIT(S): 5000 INJECTION INTRAVENOUS; SUBCUTANEOUS at 05:19

## 2019-05-09 RX ADMIN — PANTOPRAZOLE SODIUM 40 MILLIGRAM(S): 20 TABLET, DELAYED RELEASE ORAL at 17:59

## 2019-05-09 RX ADMIN — ATORVASTATIN CALCIUM 20 MILLIGRAM(S): 80 TABLET, FILM COATED ORAL at 22:59

## 2019-05-09 RX ADMIN — HEPARIN SODIUM 5000 UNIT(S): 5000 INJECTION INTRAVENOUS; SUBCUTANEOUS at 22:59

## 2019-05-09 RX ADMIN — SEVELAMER CARBONATE 800 MILLIGRAM(S): 2400 POWDER, FOR SUSPENSION ORAL at 12:53

## 2019-05-09 RX ADMIN — Medication 1 DROP(S): at 13:07

## 2019-05-09 RX ADMIN — Medication 1 APPLICATION(S): at 13:08

## 2019-05-09 RX ADMIN — Medication 60 MILLIGRAM(S): at 13:06

## 2019-05-09 RX ADMIN — Medication 60 MILLIGRAM(S): at 05:19

## 2019-05-09 RX ADMIN — LIDOCAINE AND PRILOCAINE CREAM 1 APPLICATION(S): 25; 25 CREAM TOPICAL at 12:54

## 2019-05-09 RX ADMIN — SEVELAMER CARBONATE 800 MILLIGRAM(S): 2400 POWDER, FOR SUSPENSION ORAL at 17:56

## 2019-05-09 NOTE — PROGRESS NOTE ADULT - ASSESSMENT
83 y/o male with ESRD on HD (MWF), HTN, RA on MTX and steroids, R leg amputation, severe CDI and megacolon, Severe PAD s/p LLE endarterectomy 2 weeks ago with dr dugan presents with T 100 and DC from L groin.  Pt also c/o R arm pain/swelling where pt had fistulogram of the AC fistula on last admission.  RUL dopplers negative for DVT.  L groin sono--negative for pseudo aneurysm and + hematoma.  Pt given 2.1 L IVF, IV vanco/aztreonam in ED    TTE (10/18)--mild-mod MR/EF 60%    Pt d/w daughter regarding advance directives--Pt is FULL RESUSCITATION (19 Apr 2019 16:12)  events noted while admitted muscle flap and plastic surgery care developed increased cough with possible bloody streaked sputuim / none present now and RN at bedside, both noticed thhick yellow sputum production  ct scan findings personally reveiwed / old films are not available on PACS at this time for comparison  Continued increase size of tubular oblong opacity in the right middle   lobe, possibly endobronchial mass with impacted airways. Irregular   opacity in the posterior left upper lobe, measuring 1.4 cm    Assessment / plan:  suspected pneumonia with mucus plugging RML  separate FERNANDO pulm nodule seen needs further eval  after antibiotic therapy  ESRD on dilaysis now  Sleep apnea, obstructive: Requires home O2 therapy, and treatment with BIPAP  bibasilar atelectasis with associated small effusions  Prior films reviewed with ct scan abd/pelvis done 7/2018 and 10/2018 with similar RLL findings NOW increased in size which raises suspicion for malignancy higher with this finding with possible endobronchial mass also present / all discussed with his DTR     Appreciate CTS recommendations   s/p BRONCH without bx done 5/6, tolerated well  POSITIVE ENDOBRONCHIAL LESION RML / 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

## 2019-05-09 NOTE — PROGRESS NOTE ADULT - ASSESSMENT
85 y/o male with ESRD on HD (MWF), HTN, RA on MTX and steroids, R leg amputation, severe CDI and megacolon, Severe PAD s/p LLE endarterectomy (4/1/19) with dr Clement presented with fever (100.5) and DC from L groin. Pt was noted to have lung mass increased in size.       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  - Repeat imaging in 2-3 months  - CT scan noted R endobronchial mass enlarging from previous CT and new L lung mass  - Pulm, CT surgery consult appreciated    #Surgical site infection s/p endarterectomy 4/1   - Continue daily dressing as recommended by plastic surgery and wound care team.   - Continue pain control with tylenol PRN, tramadol PRN and percocet PRN.   - ID, Vascular and Plastic surgery consult appreciated    #Thyroid Nodule suspicion for malignancy  - Nodule noted on US  - Repeat US in 6 months outpatient. 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 Urbano CURRIE venous doppler - no evidence of DVT; markedly increased velocity in AV fistula of arm raising suspicion of hemodynamically significant stenosis  - Vascular surgery recs appreciated.     #Recurrent C. diff diarrhea with megacolon  - Monitor off abx  - ID recs appreciated    #PAF    - CHADS2 score - less than 5   - no need for bridging  - Continue coumadin    #ESRD on HD M,W,F  -continue dialysis  -Nephro recs appreciated.     #Chronic diastolic CHF   - Last echo 2018 EF of 60-65%.   - Not fluid overloaded    #PVD  - CHADS2 score - less than 5   - no need for bridging, continue coumadin  - Daily INR    #Prophylactic Measure  - On coumadin

## 2019-05-09 NOTE — PROGRESS NOTE ADULT - SUBJECTIVE AND OBJECTIVE BOX
Subjective:  85 y/o male with ESRD on HD (MWF), CHF, A fib on AC, CAD (stent), COPD (home O2), ex-smoker, SHAYY (BIPAP), HTN, HLD, RA (on MTX and steroids), gout, GERD, C diff, megacolon,  s/p R AKA, severe PAD s/p LLE endarterectomy admitted w left groin infection. On CT chest revealed RML opacity likely representing an endobronchial mass and a 1.4 cm FERNANDO opacity. S/p bronch 5/6/19 w path POSITIVE FOR MALIGNANT CELLS, Non-small cell carcinoma. Not enough tissue to do molecular testing.    5/9/19 Pt seen,         Vital Signs:  Vital Signs Last 24 Hrs  T(C): 36.6 (05-09-19 @ 04:20), Max: 37.1 (05-08-19 @ 20:07)  T(F): 97.9 (05-09-19 @ 04:20), Max: 98.7 (05-08-19 @ 20:07)  HR: 66 (05-09-19 @ 04:20) (58 - 89)  BP: 121/56 (05-09-19 @ 04:20) (121/56 - 157/37)  RR: 18 (05-09-19 @ 04:20) (16 - 18)  SpO2: 98% (05-09-19 @ 04:20) (96% - 98%) on (O2)    Telemetry/Alarms:    Relevant labs, radiology and Medications reviewed                        9.6    14.26 )-----------( 225      ( 09 May 2019 06:45 )             32.8     05-09    140  |  106  |  33<H>  ----------------------------<  94  4.0   |  27  |  3.07<H>    Ca    7.5<L>      09 May 2019 06:45  Phos  4.3     05-08    TPro  x   /  Alb  1.8<L>  /  TBili  x   /  DBili  x   /  AST  x   /  ALT  x   /  AlkPhos  x   05-08    PT/INR - ( 09 May 2019 06:45 )   PT: 13.4 sec;   INR: 1.20 ratio         PTT - ( 08 May 2019 11:00 )  PTT:77.2 sec    Final Diagnosis    1. Bronchus, biopsy (RIGHT middle lobe, lung nodule):  - Scattered atypical, degenerated and poorly preserved cells  in a  background of extensive acute inflammatory cells and  necrosis  (see comment).  - Detached fragment of bronchial mucosa with mild reactive  changes.    2. Cytology (brushings, right middle lobe, lung nodule):  - POSITIVE FOR MALIGNANT CELLS.  - Non-small cell carcinoma, favor squamous cell carcinoma,  in a  background of extensive acute inflammatory cells and  necrosis.    3. Cytology (washings and cell block, right middle lobe, lung):  - Atypical cells present.  - Degenerated and poorly preserved atypical cells in a  background  of extensive acute inflammatory cells and necrosis      MEDICATIONS  (STANDING):  allopurinol 100 milliGRAM(s) Oral <User Schedule>  artificial  tears Solution 1 Drop(s) Both EYES three times a day  atorvastatin 20 milliGRAM(s) Oral at bedtime  cholestyramine Powder (Sugar-Free) 4 Gram(s) Oral daily  diltiazem    Tablet 60 milliGRAM(s) Oral four times a day  doxercalciferol Injectable 1 MICROGram(s) IV Push <User Schedule>  epoetin nelly Injectable 77770 Unit(s) IV Push <User Schedule>  finasteride 5 milliGRAM(s) Oral daily  gabapentin 300 milliGRAM(s) Oral daily  heparin  Injectable 5000 Unit(s) SubCutaneous every 8 hours  lidocaine/prilocaine Cream 1 Application(s) Topical daily  pantoprazole  Injectable 40 milliGRAM(s) IV Push two times a day  predniSONE   Tablet 5 milliGRAM(s) Oral every other day  predniSONE   Tablet 2.5 milliGRAM(s) Oral every other day  sevelamer carbonate 800 milliGRAM(s) Oral three times a day with meals  silver sulfADIAZINE 1% Cream 1 Application(s) Topical daily  warfarin 5 milliGRAM(s) Oral daily    MEDICATIONS  (PRN):  acetaminophen   Tablet .. 650 milliGRAM(s) Oral every 6 hours PRN Mild Pain (1 - 3)  ALBUTerol    90 MICROgram(s) HFA Inhaler 2 Puff(s) Inhalation every 6 hours PRN Shortness of Breath and/or Wheezing  guaiFENesin   Syrup  (Sugar-Free) 100 milliGRAM(s) Oral every 6 hours PRN Cough  oxyCODONE    5 mG/acetaminophen 325 mG 1 Tablet(s) Oral every 4 hours PRN Moderate Pain (4 - 6)      Physical exam  Gen  Neuro  Card  Pulm  Abd  Ext      I&O's Summary      Assessment  84y Male  w/ PAST MEDICAL & SURGICAL HISTORY:  Above knee amputation of right lower extremity  Recurrent Clostridium difficile diarrhea  Diastolic CHF  Peripheral vascular disease  Afib  Anemia  CKD (chronic kidney disease)  COPD (chronic obstructive pulmonary disease)  SHAYY (obstructive sleep apnea)  Sepsis, due to unspecified organism: 2/2 poorly healing wounds b/l  Dyspepsia: On moderate exertion.  Sleep apnea, obstructive: Requires home 02 therapy, and treatment with BIPAP  Atelectasis  Pleural effusion, bilateral  Respiratory failure  Peripheral edema  CRI (chronic renal insufficiency)  Gout  Benign prostatic hypertrophy  Spinal stenosis  Hypercholesterolemia  GERD (gastroesophageal reflux disease)  CAD (coronary artery disease)  Hypertension  S/P angioplasty with stent  Cataract of left eye  Prostate: Surgery green light procedure.  S/P rotator cuff surgery: Right  S/P angioplasty  admitted with complaints of Patient is a 84y old  Male who presents with a chief complaint of L groin DC (09 May 2019 07:28)  s/p t Bronchoscopy, adult 5/6/19 positive for CA      PLAN  oncology evaluation after family/pt discussion  might require more tissue for molecular testing of cancer, await further input from oncology/pulmonary  care as per medical services    Discussed with Cardiothoracic Team at AM rounds. Subjective:  85 y/o male with ESRD on HD (MWF), CHF, A fib on AC, CAD (stent), COPD (home O2), ex-smoker, SHAYY (BIPAP), HTN, HLD, RA (on MTX and steroids), gout, GERD, C diff, megacolon,  s/p R AKA, severe PAD s/p LLE endarterectomy admitted w left groin infection. On CT chest revealed RML opacity likely representing an endobronchial mass and a 1.4 cm FERNANDO opacity. S/p bronch 5/6/19 w path POSITIVE FOR MALIGNANT CELLS, Non-small cell carcinoma. Not enough tissue to do molecular testing.    5/9/19 Pt seen, asked pt if aware of patholgy results, states he was told "it was nothing to be worried about" Denies hemoptysis, SOB, pain.        Vital Signs:  Vital Signs Last 24 Hrs  T(C): 36.6 (05-09-19 @ 04:20), Max: 37.1 (05-08-19 @ 20:07)  T(F): 97.9 (05-09-19 @ 04:20), Max: 98.7 (05-08-19 @ 20:07)  HR: 66 (05-09-19 @ 04:20) (58 - 89)  BP: 121/56 (05-09-19 @ 04:20) (121/56 - 157/37)  RR: 18 (05-09-19 @ 04:20) (16 - 18)  SpO2: 98% (05-09-19 @ 04:20) (96% - 98%) on (O2)    Telemetry/Alarms:    Relevant labs, radiology and Medications reviewed                        9.6    14.26 )-----------( 225      ( 09 May 2019 06:45 )             32.8     05-09    140  |  106  |  33<H>  ----------------------------<  94  4.0   |  27  |  3.07<H>    Ca    7.5<L>      09 May 2019 06:45  Phos  4.3     05-08    TPro  x   /  Alb  1.8<L>  /  TBili  x   /  DBili  x   /  AST  x   /  ALT  x   /  AlkPhos  x   05-08    PT/INR - ( 09 May 2019 06:45 )   PT: 13.4 sec;   INR: 1.20 ratio         PTT - ( 08 May 2019 11:00 )  PTT:77.2 sec    Final Diagnosis    1. Bronchus, biopsy (RIGHT middle lobe, lung nodule):  - Scattered atypical, degenerated and poorly preserved cells  in a  background of extensive acute inflammatory cells and  necrosis  (see comment).  - Detached fragment of bronchial mucosa with mild reactive  changes.    2. Cytology (brushings, right middle lobe, lung nodule):  - POSITIVE FOR MALIGNANT CELLS.  - Non-small cell carcinoma, favor squamous cell carcinoma,  in a  background of extensive acute inflammatory cells and  necrosis.    3. Cytology (washings and cell block, right middle lobe, lung):  - Atypical cells present.  - Degenerated and poorly preserved atypical cells in a  background  of extensive acute inflammatory cells and necrosis      MEDICATIONS  (STANDING):  allopurinol 100 milliGRAM(s) Oral <User Schedule>  artificial  tears Solution 1 Drop(s) Both EYES three times a day  atorvastatin 20 milliGRAM(s) Oral at bedtime  cholestyramine Powder (Sugar-Free) 4 Gram(s) Oral daily  diltiazem    Tablet 60 milliGRAM(s) Oral four times a day  doxercalciferol Injectable 1 MICROGram(s) IV Push <User Schedule>  epoetin nelly Injectable 79339 Unit(s) IV Push <User Schedule>  finasteride 5 milliGRAM(s) Oral daily  gabapentin 300 milliGRAM(s) Oral daily  heparin  Injectable 5000 Unit(s) SubCutaneous every 8 hours  lidocaine/prilocaine Cream 1 Application(s) Topical daily  pantoprazole  Injectable 40 milliGRAM(s) IV Push two times a day  predniSONE   Tablet 5 milliGRAM(s) Oral every other day  predniSONE   Tablet 2.5 milliGRAM(s) Oral every other day  sevelamer carbonate 800 milliGRAM(s) Oral three times a day with meals  silver sulfADIAZINE 1% Cream 1 Application(s) Topical daily  warfarin 5 milliGRAM(s) Oral daily    MEDICATIONS  (PRN):  acetaminophen   Tablet .. 650 milliGRAM(s) Oral every 6 hours PRN Mild Pain (1 - 3)  ALBUTerol    90 MICROgram(s) HFA Inhaler 2 Puff(s) Inhalation every 6 hours PRN Shortness of Breath and/or Wheezing  guaiFENesin   Syrup  (Sugar-Free) 100 milliGRAM(s) Oral every 6 hours PRN Cough  oxyCODONE    5 mG/acetaminophen 325 mG 1 Tablet(s) Oral every 4 hours PRN Moderate Pain (4 - 6)      Physical exam  Gen NAD  Neuro AAOx3  Card RRR  Pulm clear  Abd soft  Ext Rt AKA, RUE thrill AV access, Left leg with dry dressing      I&O's Summary      Assessment  84y Male  w/ PAST MEDICAL & SURGICAL HISTORY:  Above knee amputation of right lower extremity  Recurrent Clostridium difficile diarrhea  Diastolic CHF  Peripheral vascular disease  Afib  Anemia  CKD (chronic kidney disease)  COPD (chronic obstructive pulmonary disease)  SHAYY (obstructive sleep apnea)  Sepsis, due to unspecified organism: 2/2 poorly healing wounds b/l  Dyspepsia: On moderate exertion.  Sleep apnea, obstructive: Requires home 02 therapy, and treatment with BIPAP  Atelectasis  Pleural effusion, bilateral  Respiratory failure  Peripheral edema  CRI (chronic renal insufficiency)  Gout  Benign prostatic hypertrophy  Spinal stenosis  Hypercholesterolemia  GERD (gastroesophageal reflux disease)  CAD (coronary artery disease)  Hypertension  S/P angioplasty with stent  Cataract of left eye  Prostate: Surgery green light procedure.  S/P rotator cuff surgery: Right  S/P angioplasty  admitted with complaints of Patient is a 84y old  Male who presents with a chief complaint of L groin DC (09 May 2019 07:28)  s/p t Bronchoscopy, adult 5/6/19 positive for CA      PLAN  oncology evaluation after family/pt discussion  might require more tissue for molecular testing of cancer, await further input from oncology/pulmonary  pt to be presented at [facility] 5/10/19  care as per medical services    Discussed with Cardiothoracic Team at AM rounds.

## 2019-05-09 NOTE — PROGRESS NOTE ADULT - ASSESSMENT
85 y/o male with ESRD on HD (MWF), HTN, RA on MTX and steroids, R leg amputation, severe CDI and megacolon, Severe PAD s/p LLE endarterectomy 2 weeks ago with dr dugan presents with T 100 and DC from L groin.  Pt also c/o R arm pain/swelling where pt had fistulogram of the AC fistula on last admission.  RUL dopplers negative for DVT.  L groin sono--negative for pseudo aneurysm and + hematoma.  Pt given 2.1 L IVF, IV vanco/aztreonam in ED  On my exam in HD suite, awake, chronically ill appearing, NAD, daughter at bedside. Pt seen by Dr dugan in ED.  CXR--clear    4/20  s/p hd yesterday  prolonged bleed from access stopped w pressure  feels well  arm less tender  received 1 u prbc on hd yesterday  monitor cbc    4/22 SY  --ESRD : HD order and tx reviewed.   Tolerating tx well.  --AVF : as above.  Prolonged bleeding post tx.  Venous pressure acceptable today.  Monitor AVF function.  --Anemia : with acute loss.  Active infection leading to LETICIA hyporesponsiveness.  Transfuse 2 units today.  --PAD : post Left Ileofemoral Endarterectomy : Monitor Left foot perfusion.  --ID ; Left Groin infection : Continue abtx.  For debridement in am.    4/23  as above  For HD Wednesday  groin wash out today    4/24 SY  --ESRD : HD order and tx reviewed.  Tolerating tx well.  AVF cannulated without difficulty.  --Left Groin wound --Post  Muscle Flap Coverage.  Continue abtx.  --PAD : monitor perfusion.  --ID : continue Cefepime.    4/25 SY  --ESRD : Continue TIW HD  --AVF : cannulated without difficulty.  RUE edema much improved.  --Left Groin wound : post Muscle Flap Coverage.   Continue abtx.  --ID: continue abtx.  --PAD : post Left Ileofemoral endarterectomy : monitor perfusion.    4/26  seen on hd  in good spirits   stable on hd  fluid removal reduced due to low BP  hgb stable    4/27 SY  --ESRD : Continue TIW HD  --AVF : functioning well.  --PAD : Post Left Ileofemoral Endarterectomy : monitor perfusion  --Left Groin Wound : post Muscle Flap Coverage : continue abtx and wound care.    4/28 SY  --ESRD : for HD in am  --AVF : functioning well with improved cannulation.  --PAD : monitor perfusion ( Post Left Ileofemoral Endarterectomy)  --Left Groin wound : post Muscle Flap : continue abtx and wound care.    4/29 MK   - ESRD: tolerting hd, AVF cannultion well  - inc sputum production with possible endobronchial mass: dr bush input noted, may need bronch  - AMS with more confusion and jerking motion: pain meds have been limited, will get ammonia and abg value, has not been using bipap during hospitalization  - [facility]: fu h/h   - left groin wound s/p muscle flap coverage with s/p left ileofemoral endarterectomy      4/30 SY  --ESRD : Continue TIW HD  --Pulm : Hemoptysis resolved.  New RML PNA and FERNANDO nodule   improved resp status . Now PNA with mucus plugging and new findings of lesion.  For outpt work up once clinically improved.  --AMS ;resolved and at baseline.  Attempt to minimize pain meds.  --PAD : Monitor Left groin and LE wound.  --ID : continue Cefepime and Daptomycin with po vanco for C diff prophylaxis.    5/1 SY  --ESRD :HD order and tx reviewed.    --Pulm : New RML PNA and FERNANDO nodule.  Pulmonary follow up appreciated.  Continue ABTX.  --AMS : resolved and stable.  --Increasing Leukocytosis : continue abtx.  D/w Dr Flanagan.  --PAD : LLE pain improved. (post Left Ileofemoral endarterectomy last admission.    5/2  Some av access arm pain last hd improved w repositioning  stable vitals  pulmonary/ thoracic surgery input noted  d/w Pt daughter    5/3  arm feels better today  for dialysis later  sacral evaluation for possible intervention    5/4 MK  - ESRD on hd: tolerating uf with hd   - Afib on AC: cardizem and titrate as needed  - Anemia: epo   - PNA with mucus plug and possible endobronchial lesion, with rising leukocytosis await timing of bronch  - rising leukocytosis with hx of cdiff: no diarrhea   LM with dr bush   bronch to be done by [facility], Dr malave, if ffp needed, will do hd with ffp in am followed by bronch ...    5/7 SY  --RML opacity : post Bronchoscopy .  Positive for endobronchial lesion.   no biopsy done.   Follow up Cytology/Cultures.  Further options to be discussed with family.  --ESRD : for HD in am.  --A FIB : Continue Cardizem for rate control and to restart a/c,  --Leukocytosis : Increasing  : Post Bronch.  Continue abtx.    5/8 SY  --ESRD : HD order and tx reviewed.  Tolerating tx well.  --Pulmonary : Positive Endobronchial lesion.  No further intervention at present and follow up as outpt,  --A FIB : continue Cardizem.  Restarted on A/C.  --Leukocytosis ;  Slightly improved. Off all abtx since 5/2.  Remains afebrile.    5/9  ESRD  on HD   For out pt RET scan, endobronchial lesion   HD am

## 2019-05-09 NOTE — PROGRESS NOTE ADULT - SUBJECTIVE AND OBJECTIVE BOX
HPI: 85 y/o male with ESRD on HD (MWF), HTN, RA on MTX and steroids, R leg amputation, severe CDI and megacolon, Severe PAD s/p LLE endarterectomy (4/1/19)with dr Clement presents with T 100.5 and DC from L groin.  Pt also c/o R arm pain/swelling where pt had fistulogram of the AC fistula on last admission.    5/9: Pt was seen and examined. No other overnight event. No acute concerns. Questions and concerns addressed. Bloody BM has resolved.      REVIEW OF SYSTEMS:  CONSTITUTIONAL: No weakness, fevers or chills  EYES/ENT: No visual changes;  No vertigo or throat pain   NECK: No pain or stiffness  RESPIRATORY: No cough, wheezing, hemoptysis; No shortness of breath  CARDIOVASCULAR: No chest pain or palpitations  GASTROINTESTINAL: No abdominal or epigastric pain. No nausea, vomiting, or hematemesis; No diarrhea or constipation. No melena or hematochezia.  GENITOURINARY: No dysuria, frequency or hematuria  NEUROLOGICAL: No numbness or weakness  SKIN: Left ankle and thigh wound.   All other review of systems is negative unless indicated above    Vital Signs Last 24 Hrs  T(C): 36.5 (09 May 2019 12:20), Max: 37.1 (08 May 2019 20:07)  T(F): 97.7 (09 May 2019 12:20), Max: 98.7 (08 May 2019 20:07)  HR: 77 (09 May 2019 12:20) (61 - 78)  BP: 123/36 (09 May 2019 12:20) (121/56 - 142/31)  RR: 20 (09 May 2019 12:20) (18 - 20)  SpO2: 95% (09 May 2019 12:20) (95% - 98%)      PHYSICAL EXAM:  Constitutional: NAD, awake and alert, well-developed  HEENT: PERR, EOMI, Normal Hearing, MMM  Neck: Soft and supple, No LAD, No JVD  Respiratory: Diminished breath sounds on the right  Cardiovascular: S1 and S2, regular rate and rhythm, no Murmurs, gallops or rubs  Gastrointestinal: Bowel Sounds present, soft, nontender, nondistended, no guarding, no rebound  Extremities: No peripheral edema. R BKA, Left surgical site C/D/I. Left ankle wound not draining. IAD noted at buttocks.   Vascular: 2+ peripheral pulses  Neurological: A/O x 3, no focal deficits  Musculoskeletal: 5/5 strength b/l upper and lower extremities  Skin: No rashes      MEDICATIONS:  MEDICATIONS  (STANDING):  allopurinol 100 milliGRAM(s) Oral <User Schedule>  artificial  tears Solution 1 Drop(s) Both EYES three times a day  atorvastatin 20 milliGRAM(s) Oral at bedtime  cholestyramine Powder (Sugar-Free) 4 Gram(s) Oral daily  diltiazem    Tablet 60 milliGRAM(s) Oral four times a day  doxercalciferol Injectable 1 MICROGram(s) IV Push <User Schedule>  epoetin nelly Injectable 76257 Unit(s) IV Push <User Schedule>  finasteride 5 milliGRAM(s) Oral daily  gabapentin 300 milliGRAM(s) Oral daily  heparin  Injectable 5000 Unit(s) SubCutaneous every 8 hours  lidocaine/prilocaine Cream 1 Application(s) Topical daily  pantoprazole  Injectable 40 milliGRAM(s) IV Push two times a day  predniSONE   Tablet 5 milliGRAM(s) Oral every other day  predniSONE   Tablet 2.5 milliGRAM(s) Oral every other day  sevelamer carbonate 800 milliGRAM(s) Oral three times a day with meals  silver sulfADIAZINE 1% Cream 1 Application(s) Topical daily  warfarin 5 milliGRAM(s) Oral daily      LABS: All Labs Reviewed:                        9.6    14.26 )-----------( 225      ( 09 May 2019 06:45 )             32.8     05-09    140  |  106  |  33<H>  ----------------------------<  94  4.0   |  27  |  3.07<H>    Ca    7.5<L>      09 May 2019 06:45  Phos  4.3     05-08    TPro  x   /  Alb  1.8<L>  /  TBili  x   /  DBili  x   /  AST  x   /  ALT  x   /  AlkPhos  x   05-08    PT/INR - ( 09 May 2019 06:45 )   PT: 13.4 sec;   INR: 1.20 ratio         PTT - ( 08 May 2019 11:00 )  PTT:77.2 sec      Blood Culture: 05-06 @ 19:49  Organism --  Gram Stain Blood -- Gram Stain   No polymorphonuclear cells seen per low power field  No squamous epithelial cells per low power field  No organisms seen per oil power field  Specimen Source Bronch Wash right lung middle lobe washings for culture  Culture-Blood --

## 2019-05-09 NOTE — PROGRESS NOTE ADULT - SUBJECTIVE AND OBJECTIVE BOX
Patient is a 84y old  Male who presents with a chief complaint of L groin DC (28 Apr 2019 17:14)      HPI:  83 y/o male with ESRD on HD (MWF), HTN, RA on MTX and steroids, R leg amputation, severe CDI and megacolon, Severe PAD s/p LLE endarterectomy 2 weeks ago with dr dugan presents with T 100 and DC from L groin.  Pt also c/o R arm pain/swelling where pt had fistulogram of the AC fistula on last admission.  RUL dopplers negative for DVT.  L groin sono--negative for pseudo aneurysm and + hematoma.  Pt given 2.1 L IVF, IV vanco/aztreonam in ED  On my exam in HD suite, awake, chronically ill appearing, NAD, daughter at bedside. Pt seen by Dr dugan in ED.  CXR--clear    TTE (10/18)--mild-mod MR/EF 60%    Pt d/w daughter regarding advance directives--Pt is FULL RESUSCITATION (19 Apr 2019 16:12)  events noted while admitted muscle flap and plastic surgery care developed increased cough with possible bloody streaked sputuim / none present now and RN at bedside, both noticed thhick yellow sputum production    4/30  data discussed with RN at bedside  no cp  improved breathing    5/1  feels the same  decreased cough  DTR is contacted and data discussed regarding abnormal ct scan findings  5/2  data discussed with pt's DTR and medical team as well  although BRONCH is appropriate given ct scan findings, it can not be done yet till INR corrected specially in pt with ESRD.  DATA DISCUSSED WITH DR JORGE MASSEY AS WELL for thoracic surgery consult  5/3  data discussed with thoracic surgery yesterday  discussed with dr Rashid today  pt is resting comfortably  no resp distress  no hemoptysis    5/4  No acute events occurred overnight  Discussed with hospitalist/resident team and daughter     5/5  INR still elevated   No acute pulmonary events occurred overnight  5/6 uneventful night  in dialysis  bronch postponed till INR corrected  5/7  s/p BRONCH without bx yesterday afternoon  tolerated well  POSITIVE ENDOBRONCHIAL LESION RML  no hemoptysis this am  feels ok  5/8  Bronch findings discussed with pt  plan rev with thoracic surgery with plan for fu imaging in 2-3 months  no active sxs and no reported hemoptysis  pt is not a good surgical candidate at this time  5/9  pt is resting comfortably  no hemoptysis  will need to discuss path data with family  pos brushing for NSC lung ca, favor Sq cell ca  MEDICATIONS  (STANDING):  allopurinol 100 milliGRAM(s) Oral <User Schedule>  artificial tears (preservative free) Ophthalmic Solution 1 Drop(s) Both EYES three times a day  atorvastatin 20 milliGRAM(s) Oral at bedtime  cholestyramine Powder (Sugar-Free) 4 Gram(s) Oral daily  diltiazem    Tablet 60 milliGRAM(s) Oral four times a day  doxercalciferol Injectable 1 MICROGram(s) IV Push <User Schedule>  epoetin nelly Injectable 11638 Unit(s) IV Push <User Schedule>  finasteride 5 milliGRAM(s) Oral daily  gabapentin 300 milliGRAM(s) Oral daily  hydrocortisone sodium succinate Injectable 100 milliGRAM(s) IV Push every 8 hours  lidocaine/prilocaine Cream 1 Application(s) Topical daily  pantoprazole    Tablet 40 milliGRAM(s) Oral before breakfast  predniSONE   Tablet 5 milliGRAM(s) Oral every other day  predniSONE   Tablet 2.5 milliGRAM(s) Oral every other day  sevelamer carbonate 800 milliGRAM(s) Oral three times a day with meals  silver sulfADIAZINE 1% Cream 1 Application(s) Topical daily    MEDICATIONS  (PRN):  acetaminophen   Tablet .. 650 milliGRAM(s) Oral every 6 hours PRN Temp greater or equal to 38.5C (101.3F)  acetaminophen   Tablet .. 650 milliGRAM(s) Oral every 6 hours PRN Mild Pain (1 - 3)  ALBUTerol    90 MICROgram(s) HFA Inhaler 2 Puff(s) Inhalation every 6 hours PRN Shortness of Breath and/or Wheezing  guaiFENesin   Syrup  (Sugar-Free) 100 milliGRAM(s) Oral every 6 hours PRN Cough  oxyCODONE    5 mG/acetaminophen 325 mG 1 Tablet(s) Oral every 4 hours PRN Moderate Pain (4 - 6)    Vital Signs Last 24 Hrs  T(C): 36.5 (07 May 2019 05:40), Max: 37.3 (06 May 2019 16:46)  T(F): 97.7 (07 May 2019 05:40), Max: 99.1 (06 May 2019 16:46)  HR: 63 (07 May 2019 05:40) (63 - 179)  BP: 133/48 (07 May 2019 05:40) (107/61 - 167/43)  BP(mean): --  RR: 17 (07 May 2019 05:40) (14 - 20)  SpO2: 94% (07 May 2019 05:40) (89% - 99%)  PHYSICAL EXAM    HEENT: PERRL, conjunctiva clear, EOM's intact, non injected pharynx, no exudate, TM   normal  Neck: Supple, , no adenopathy, thyroid: not enlarged, no carotid bruit or JVD  Back: Symmetric, no  tenderness,no soft tissue tenderness  Lungs: Clear to auscultation bilateral,no adventitious breath sounds, normal   expiratory phase  Heart: Regular rate and rhythm, S1, S2 normal, no murmur, rub or gallop  Abdomen: Soft, non-tender, bowel sounds active , no hepatosplenomegaly  Extremities: no cyanosis or edema, no joint swelling, hx AKA right side  Skin: Skin color, texture normal, no rashes   Neurologic: Alert and oriented X3 , cranial nerves intact, sensory and motor normal,    ECG:    LABS:                                 10.7   20.09 )-----------( 361      ( 06 May 2019 07:45 )             34.9   05-06    137  |  105  |  48<H>  ----------------------------<  90  4.3   |  23  |  4.78<H>    Ca    8.1<L>      06 May 2019 07:45  Phos  4.1     05-06    TPro  x   /  Alb  2.0<L>  /  TBili  x   /  DBili  x   /  AST  x   /  ALT  x   /  AlkPhos  x   05-06               9.8    16.81 )-----------( 443      ( 01 May 2019 06:35 )             32.1   PT/INR - ( 06 May 2019 07:45 )   PT: 19.4 sec;   INR: 1.72 ratio                                 9.9    13.16 )-----------( 358      ( 30 Apr 2019 06:08 )             32.1                           10.1   7.96  )-----------( 334      ( 29 Apr 2019 07:08 )             33.2     04-29    04-30    142  |  111<H>  |  19  ----------------------------<  87  3.8   |  24  |  2.70<H>    Ca    8.3<L>      30 Apr 2019 06:08  Phos  3.4     04-29    TPro  x   /  Alb  1.8<L>  /  TBili  x   /  DBili  x   /  AST  x   /  ALT  x   /  AlkPhos  x   04-29  138  |  108  |  35<H>  ----------------------------<  95  4.4   |  22  |  4.61<H>    Ca    8.0<L>      29 Apr 2019 07:08              PT/INR - ( 29 Apr 2019 07:08 )   PT: 39.9 sec;   INR: 3.46 ratio                   RADIOLOGY & ADDITIONAL STUDIES:  < from: CT Chest No Cont (04.28.19 @ 12:17) >  PROCEDURE:   CT of the Chest was performed without intravenous contrast.  Sagittal and coronal reformats were performed.    FINDINGS:    LUNGS AND LARGE AIRWAYS: Emphysema. Continued increase size of tubular   oblong opacity in the right middle lobe, possibly endobronchial mass with   impacted airways. Possible associated broncholiths. Irregular opacity in   the posterior left upper lobe (series 3, image 60), measuring 1.4 cm.  PLEURA: Small left pleural effusion. Trace right pleural effusion.  VESSELS: Atherosclerotic calcifications.   HEART: Heart size is normal. No pericardial effusion.  MEDIASTINUM AND [place]: No lymphadenopathy.  CHEST WALL AND LOWER NECK: Left thyroid nodule, measuring 2.9 cm.   Extension of the thyroid gland into the superior mediastinum.   VISUALIZED UPPER ABDOMEN: Cholelithiasis. Unchanged probable cyst in   hepatic segment 8.  BONES: Degenerative changes of the spine.    IMPRESSION:   Continued increase size of tubular oblong opacity in the right middle   lobe, possibly endobronchial mass with impacted airways. Irregular   opacity in the posterior left upper lobe, measuring 1.4 cm. PET/CT is   recommended for further evaluation.    Left thyroid nodule, measuring 2.9 cm. Further evaluation with thyroid   ultrasound is recommended.    < end of copied text >

## 2019-05-09 NOTE — PROGRESS NOTE ADULT - SUBJECTIVE AND OBJECTIVE BOX
NEPHROLOGY INTERVAL HPI/OVERNIGHT EVENTS:    Date of Service: 05-08-19 @ 10:30    5/9  daughter at bedside  lung bx results noted  vvs   HD in am    5/8 SY  No acute events.  Reports feeling fair.  No new complaints.  Denies SOB.  understands d/w Dr Maxwell.    5/7 SY  Post Bronchoscopy last night.  No complications.  Feeling well.  Complains of sacral decub wound.    5/3  up in chair  has pain on buttocks will be evaluated by nursing for breakdown  for dialysis today     5/2  thoracic surgery/ pulmonary input noted   d/w Pts daughter  tolerating dialysis sessions    5/1 SY  Feeling well today.  No distress noted.    HPI:  83 y/o male with ESRD on HD (MWF), HTN, RA on MTX and steroids, R leg amputation, severe CDI and megacolon, Severe PAD s/p LLE endarterectomy 2 weeks ago with dr dugan presents with T 100 and DC from L groin.  Pt also c/o R arm pain/swelling where pt had fistulogram of the AC fistula on last admission.  RUL dopplers negative for DVT.  L groin sono--negative for pseudo aneurysm and + hematoma.  Pt given 2.1 L IVF, IV vanco/aztreonam in ED  On my exam in HD suite, awake, chronically ill appearing, NAD, daughter at bedside. Pt seen by Dr dugan in ED.  CXR--clear    TTE (10/18)--mild-mod MR/EF 60%    Pt d/w daughter regarding advance directives--Pt is FULL RESUSCITATION (19 Apr 2019 16:12)    MEDICATIONS  (STANDING):  allopurinol 100 milliGRAM(s) Oral <User Schedule>  artificial  tears Solution 1 Drop(s) Both EYES three times a day  atorvastatin 20 milliGRAM(s) Oral at bedtime  cholestyramine Powder (Sugar-Free) 4 Gram(s) Oral daily  diltiazem    Tablet 60 milliGRAM(s) Oral four times a day  doxercalciferol Injectable 1 MICROGram(s) IV Push <User Schedule>  epoetin nelly Injectable 95547 Unit(s) IV Push <User Schedule>  finasteride 5 milliGRAM(s) Oral daily  gabapentin 300 milliGRAM(s) Oral daily  heparin  Injectable 5000 Unit(s) SubCutaneous every 8 hours  lidocaine/prilocaine Cream 1 Application(s) Topical daily  pantoprazole  Injectable 40 milliGRAM(s) IV Push two times a day  predniSONE   Tablet 5 milliGRAM(s) Oral every other day  predniSONE   Tablet 2.5 milliGRAM(s) Oral every other day  sevelamer carbonate 800 milliGRAM(s) Oral three times a day with meals  silver sulfADIAZINE 1% Cream 1 Application(s) Topical daily  warfarin 5 milliGRAM(s) Oral daily    MEDICATIONS  (PRN):  acetaminophen   Tablet .. 650 milliGRAM(s) Oral every 6 hours PRN Mild Pain (1 - 3)  ALBUTerol    90 MICROgram(s) HFA Inhaler 2 Puff(s) Inhalation every 6 hours PRN Shortness of Breath and/or Wheezing  guaiFENesin   Syrup  (Sugar-Free) 100 milliGRAM(s) Oral every 6 hours PRN Cough  oxyCODONE    5 mG/acetaminophen 325 mG 1 Tablet(s) Oral every 4 hours PRN Moderate Pain (4 - 6)      Vital Signs Last 24 Hrs  T(C): 36.6 (09 May 2019 04:20), Max: 37.1 (08 May 2019 20:07)  T(F): 97.9 (09 May 2019 04:20), Max: 98.7 (08 May 2019 20:07)  HR: 66 (09 May 2019 04:20) (61 - 78)  BP: 121/56 (09 May 2019 04:20) (121/56 - 157/37)  BP(mean): --  RR: 18 (09 May 2019 04:20) (16 - 18)  SpO2: 98% (09 May 2019 04:20) (96% - 98%)    PHYSICAL EXAM:  Alert and comfortable  GENERAL: No distress  CHEST/LUNG: Minimal right base rhonchi  HEART: S1S2 RRR  ABDOMEN: soft  EXTREMITIES: no edema  SKIN:     LABS:                                   9.6    14.26 )-----------( 225      ( 09 May 2019 06:45 )             32.8       05-09    140  |  106  |  33<H>  ----------------------------<  94  4.0   |  27  |  3.07<H>    Ca    7.5<L>      09 May 2019 06:45  Phos  4.3     05-08    TPro  x   /  Alb  1.8<L>  /  TBili  x   /  DBili  x   /  AST  x   /  ALT  x   /  AlkPhos  x   05-08            RADIOLOGY & ADDITIONAL TESTS:

## 2019-05-10 LAB
ADD ON TEST-SPECIMEN IN LAB: SIGNIFICANT CHANGE UP
ANION GAP SERPL CALC-SCNC: 9 MMOL/L — SIGNIFICANT CHANGE UP (ref 5–17)
BUN SERPL-MCNC: 56 MG/DL — HIGH (ref 7–23)
CALCIUM SERPL-MCNC: 7.4 MG/DL — LOW (ref 8.5–10.1)
CHLORIDE SERPL-SCNC: 105 MMOL/L — SIGNIFICANT CHANGE UP (ref 96–108)
CO2 SERPL-SCNC: 24 MMOL/L — SIGNIFICANT CHANGE UP (ref 22–31)
CREAT SERPL-MCNC: 4.11 MG/DL — HIGH (ref 0.5–1.3)
GLUCOSE SERPL-MCNC: 105 MG/DL — HIGH (ref 70–99)
HCT VFR BLD CALC: 31.3 % — LOW (ref 39–50)
HGB BLD-MCNC: 9.5 G/DL — LOW (ref 13–17)
INR BLD: 1.81 RATIO — HIGH (ref 0.88–1.16)
MCHC RBC-ENTMCNC: 30.4 GM/DL — LOW (ref 32–36)
MCHC RBC-ENTMCNC: 31.5 PG — SIGNIFICANT CHANGE UP (ref 27–34)
MCV RBC AUTO: 103.6 FL — HIGH (ref 80–100)
NRBC # BLD: 0 /100 WBCS — SIGNIFICANT CHANGE UP (ref 0–0)
PHOSPHATE SERPL-MCNC: 2.2 MG/DL — LOW (ref 2.5–4.5)
PLATELET # BLD AUTO: 227 K/UL — SIGNIFICANT CHANGE UP (ref 150–400)
POTASSIUM SERPL-MCNC: 4.1 MMOL/L — SIGNIFICANT CHANGE UP (ref 3.5–5.3)
POTASSIUM SERPL-SCNC: 4.1 MMOL/L — SIGNIFICANT CHANGE UP (ref 3.5–5.3)
PROTHROM AB SERPL-ACNC: 20.5 SEC — HIGH (ref 10–12.9)
RBC # BLD: 3.02 M/UL — LOW (ref 4.2–5.8)
RBC # FLD: 21.9 % — HIGH (ref 10.3–14.5)
SODIUM SERPL-SCNC: 138 MMOL/L — SIGNIFICANT CHANGE UP (ref 135–145)
WBC # BLD: 14.04 K/UL — HIGH (ref 3.8–10.5)
WBC # FLD AUTO: 14.04 K/UL — HIGH (ref 3.8–10.5)

## 2019-05-10 PROCEDURE — 99232 SBSQ HOSP IP/OBS MODERATE 35: CPT

## 2019-05-10 RX ORDER — PANTOPRAZOLE SODIUM 20 MG/1
40 TABLET, DELAYED RELEASE ORAL
Refills: 0 | Status: DISCONTINUED | OUTPATIENT
Start: 2019-05-11 | End: 2019-05-15

## 2019-05-10 RX ADMIN — Medication 1 DROP(S): at 06:13

## 2019-05-10 RX ADMIN — Medication 1 DROP(S): at 13:48

## 2019-05-10 RX ADMIN — Medication 1 DROP(S): at 22:27

## 2019-05-10 RX ADMIN — Medication 60 MILLIGRAM(S): at 17:40

## 2019-05-10 RX ADMIN — HEPARIN SODIUM 5000 UNIT(S): 5000 INJECTION INTRAVENOUS; SUBCUTANEOUS at 22:27

## 2019-05-10 RX ADMIN — HEPARIN SODIUM 5000 UNIT(S): 5000 INJECTION INTRAVENOUS; SUBCUTANEOUS at 06:13

## 2019-05-10 RX ADMIN — DOXERCALCIFEROL 1 MICROGRAM(S): 2.5 CAPSULE ORAL at 10:30

## 2019-05-10 RX ADMIN — OXYCODONE AND ACETAMINOPHEN 1 TABLET(S): 5; 325 TABLET ORAL at 22:27

## 2019-05-10 RX ADMIN — GABAPENTIN 300 MILLIGRAM(S): 400 CAPSULE ORAL at 13:48

## 2019-05-10 RX ADMIN — CHOLESTYRAMINE 4 GRAM(S): 4 POWDER, FOR SUSPENSION ORAL at 12:50

## 2019-05-10 RX ADMIN — Medication 60 MILLIGRAM(S): at 06:13

## 2019-05-10 RX ADMIN — Medication 1 APPLICATION(S): at 13:50

## 2019-05-10 RX ADMIN — LIDOCAINE AND PRILOCAINE CREAM 1 APPLICATION(S): 25; 25 CREAM TOPICAL at 06:13

## 2019-05-10 RX ADMIN — HEPARIN SODIUM 5000 UNIT(S): 5000 INJECTION INTRAVENOUS; SUBCUTANEOUS at 13:47

## 2019-05-10 RX ADMIN — FINASTERIDE 5 MILLIGRAM(S): 5 TABLET, FILM COATED ORAL at 13:49

## 2019-05-10 RX ADMIN — Medication 60 MILLIGRAM(S): at 13:49

## 2019-05-10 RX ADMIN — WARFARIN SODIUM 5 MILLIGRAM(S): 2.5 TABLET ORAL at 22:27

## 2019-05-10 RX ADMIN — ERYTHROPOIETIN 10000 UNIT(S): 10000 INJECTION, SOLUTION INTRAVENOUS; SUBCUTANEOUS at 10:26

## 2019-05-10 RX ADMIN — ATORVASTATIN CALCIUM 20 MILLIGRAM(S): 80 TABLET, FILM COATED ORAL at 22:27

## 2019-05-10 RX ADMIN — OXYCODONE AND ACETAMINOPHEN 1 TABLET(S): 5; 325 TABLET ORAL at 22:28

## 2019-05-10 RX ADMIN — SEVELAMER CARBONATE 800 MILLIGRAM(S): 2400 POWDER, FOR SUSPENSION ORAL at 07:24

## 2019-05-10 RX ADMIN — PANTOPRAZOLE SODIUM 40 MILLIGRAM(S): 20 TABLET, DELAYED RELEASE ORAL at 06:12

## 2019-05-10 NOTE — PROGRESS NOTE ADULT - SUBJECTIVE AND OBJECTIVE BOX
HPI: 85 y/o male with ESRD on HD (MWF), HTN, RA on MTX and steroids, R leg amputation, severe CDI and megacolon, Severe PAD s/p LLE endarterectomy (4/1/19)with dr Clement presents with T 100.5 and DC from L groin.  Pt also c/o R arm pain/swelling where pt had fistulogram of the AC fistula on last admission.    5/10: Pt was seen and examined. S/p HD today. No acute complaints. No overnights events. Awaiting interventions options.     REVIEW OF SYSTEMS:  CONSTITUTIONAL: No weakness, fevers or chills  EYES/ENT: No visual changes;  No vertigo or throat pain   NECK: No pain or stiffness  RESPIRATORY: No cough, wheezing, hemoptysis; No shortness of breath  CARDIOVASCULAR: No chest pain or palpitations  GASTROINTESTINAL: No abdominal or epigastric pain. No nausea, vomiting, or hematemesis; No diarrhea or constipation. No melena or hematochezia.  GENITOURINARY: No dysuria, frequency or hematuria  NEUROLOGICAL: No numbness or weakness  SKIN: Left ankle and thigh wound.   All other review of systems is negative unless indicated above    Vital Signs Last 24 Hrs  T(C): 37.3 (10 May 2019 16:27), Max: 37.3 (10 May 2019 16:27)  T(F): 99.2 (10 May 2019 16:27), Max: 99.2 (10 May 2019 16:27)  HR: 80 (10 May 2019 16:27) (60 - 80)  BP: 168/90 (10 May 2019 16:27) (115/34 - 168/90)  RR: 16 (10 May 2019 16:27) (16 - 18)  SpO2: 96% (10 May 2019 05:51) (90% - 96%)      PHYSICAL EXAM:  Constitutional: NAD, awake and alert, well-developed  HEENT: PERR, EOMI, Normal Hearing, MMM  Neck: Soft and supple, No LAD, No JVD  Respiratory: Diminished breath sounds on the right  Cardiovascular: S1 and S2, regular rate and rhythm, no Murmurs, gallops or rubs  Gastrointestinal: Bowel Sounds present, soft, nontender, nondistended, no guarding, no rebound  Extremities: No peripheral edema. R BKA, Left surgical site C/D/I. Left ankle wound not draining. IAD noted at buttocks.   Vascular: 2+ peripheral pulses  Neurological: A/O x 3, no focal deficits  Musculoskeletal: 5/5 strength b/l upper and lower extremities  Skin: No rashes    MEDICATIONS:  MEDICATIONS  (STANDING):  allopurinol 100 milliGRAM(s) Oral <User Schedule>  artificial  tears Solution 1 Drop(s) Both EYES three times a day  atorvastatin 20 milliGRAM(s) Oral at bedtime  cholestyramine Powder (Sugar-Free) 4 Gram(s) Oral daily  diltiazem    Tablet 60 milliGRAM(s) Oral four times a day  doxercalciferol Injectable 1 MICROGram(s) IV Push <User Schedule>  epoetin nelly Injectable 86906 Unit(s) IV Push <User Schedule>  finasteride 5 milliGRAM(s) Oral daily  gabapentin 300 milliGRAM(s) Oral daily  heparin  Injectable 5000 Unit(s) SubCutaneous every 8 hours  lidocaine/prilocaine Cream 1 Application(s) Topical daily  pantoprazole    Tablet 40 milliGRAM(s) Oral before breakfast  predniSONE   Tablet 5 milliGRAM(s) Oral every other day  predniSONE   Tablet 2.5 milliGRAM(s) Oral every other day  silver sulfADIAZINE 1% Cream 1 Application(s) Topical daily  warfarin 5 milliGRAM(s) Oral daily      LABS: All Labs Reviewed:                        9.5    14.04 )-----------( 227      ( 10 May 2019 08:00 )             31.3     05-10    138  |  105  |  56<H>  ----------------------------<  105<H>  4.1   |  24  |  4.11<H>    Ca    7.4<L>      10 May 2019 08:00  Phos  2.2     05-10      PT/INR - ( 10 May 2019 08:00 )   PT: 20.5 sec;   INR: 1.81 ratio               Blood Culture: 05-06 @ 19:49  Organism --  Gram Stain Blood -- Gram Stain   No polymorphonuclear cells seen per low power field  No squamous epithelial cells per low power field  No organisms seen per oil power field  Specimen Source Bronch Wash right lung middle lobe washings for culture  Culture-Blood --

## 2019-05-10 NOTE — PROGRESS NOTE ADULT - SUBJECTIVE AND OBJECTIVE BOX
Subjective:  85 y/o male with ESRD on HD (MWF), CHF, A fib on AC, CAD (stent), COPD (home O2), ex-smoker, SHAYY (BIPAP), HTN, HLD, RA (on MTX and steroids), gout, GERD, C diff, megacolon,  s/p R AKA, severe PAD s/p LLE endarterectomy admitted w left groin infection. On CT chest revealed RML opacity likely representing an endobronchial mass and a 1.4 cm FERNANDO opacity. S/p bronch 5/6/19 w path POSITIVE FOR MALIGNANT CELLS, Non-small cell carcinoma. Not enough tissue to do molecular testing.    5/9/19 Pt seen, asked pt if aware of patholgy results, states he was told "it was nothing to be worried about" Denies hemoptysis, SOB, pain.  5/10/19    Vital Signs:  Vital Signs Last 24 Hrs  T(C): 36.7 (05-09-19 @ 23:03), Max: 36.7 (05-09-19 @ 17:36)  T(F): 98.1 (05-09-19 @ 23:03), Max: 98.1 (05-09-19 @ 23:03)  HR: 71 (05-09-19 @ 23:03) (70 - 77)  BP: 131/35 (05-09-19 @ 23:03) (123/35 - 131/35)  RR: 18 (05-09-19 @ 23:03) (18 - 20)  SpO2: 95% (05-09-19 @ 23:03) (90% - 95%) on (O2)    Telemetry/Alarms:    Relevant labs, radiology and Medications reviewed                        9.6    14.26 )-----------( 225      ( 09 May 2019 06:45 )             32.8     05-09    140  |  106  |  33<H>  ----------------------------<  94  4.0   |  27  |  3.07<H>    Ca    7.5<L>      09 May 2019 06:45      PT/INR - ( 09 May 2019 06:45 )   PT: 13.4 sec;   INR: 1.20 ratio         PTT - ( 08 May 2019 11:00 )  PTT:77.2 sec  MEDICATIONS  (STANDING):  allopurinol 100 milliGRAM(s) Oral <User Schedule>  artificial  tears Solution 1 Drop(s) Both EYES three times a day  atorvastatin 20 milliGRAM(s) Oral at bedtime  cholestyramine Powder (Sugar-Free) 4 Gram(s) Oral daily  diltiazem    Tablet 60 milliGRAM(s) Oral four times a day  doxercalciferol Injectable 1 MICROGram(s) IV Push <User Schedule>  epoetin nelly Injectable 22800 Unit(s) IV Push <User Schedule>  finasteride 5 milliGRAM(s) Oral daily  gabapentin 300 milliGRAM(s) Oral daily  heparin  Injectable 5000 Unit(s) SubCutaneous every 8 hours  lidocaine/prilocaine Cream 1 Application(s) Topical daily  pantoprazole  Injectable 40 milliGRAM(s) IV Push two times a day  predniSONE   Tablet 5 milliGRAM(s) Oral every other day  predniSONE   Tablet 2.5 milliGRAM(s) Oral every other day  sevelamer carbonate 800 milliGRAM(s) Oral three times a day with meals  silver sulfADIAZINE 1% Cream 1 Application(s) Topical daily  warfarin 5 milliGRAM(s) Oral daily    MEDICATIONS  (PRN):  acetaminophen   Tablet .. 650 milliGRAM(s) Oral every 6 hours PRN Mild Pain (1 - 3)  ALBUTerol    90 MICROgram(s) HFA Inhaler 2 Puff(s) Inhalation every 6 hours PRN Shortness of Breath and/or Wheezing  guaiFENesin   Syrup  (Sugar-Free) 100 milliGRAM(s) Oral every 6 hours PRN Cough  oxyCODONE    5 mG/acetaminophen 325 mG 1 Tablet(s) Oral every 4 hours PRN Moderate Pain (4 - 6)      Physical exam  Gen  Neuro  Card  Pulm  Abd  Ext    Tubes:    I&O's Summary      Assessment  84y Male  w/ PAST MEDICAL & SURGICAL HISTORY:  Above knee amputation of right lower extremity  Recurrent Clostridium difficile diarrhea  Diastolic CHF  Peripheral vascular disease  Afib  Anemia  CKD (chronic kidney disease)  COPD (chronic obstructive pulmonary disease)  SHAYY (obstructive sleep apnea)  Sepsis, due to unspecified organism: 2/2 poorly healing wounds b/l  Dyspepsia: On moderate exertion.  Sleep apnea, obstructive: Requires home 02 therapy, and treatment with BIPAP  Atelectasis  Pleural effusion, bilateral  Respiratory failure  Peripheral edema  CRI (chronic renal insufficiency)  Gout  Benign prostatic hypertrophy  Spinal stenosis  Hypercholesterolemia  GERD (gastroesophageal reflux disease)  CAD (coronary artery disease)  Hypertension  S/P angioplasty with stent  Cataract of left eye  Prostate: Surgery green light procedure.  S/P rotator cuff surgery: Right  S/P angioplasty  admitted with complaints of Patient is a 84y old  Male who presents with a chief complaint of L groin DC (09 May 2019 14:53)  Right endobronchial mass s/p Bronchoscopy, adult      PLAN  pt to be presented this am at [facility]   oncology consultation after medicine/pulm discusses w family/pt path results    Discussed with Cardiothoracic Team at AM rounds. Subjective:  85 y/o male with ESRD on HD (MWF), CHF, A fib on AC, CAD (stent), COPD (home O2), ex-smoker, SHAYY (BIPAP), HTN, HLD, RA (on MTX and steroids), gout, GERD, C diff, megacolon,  s/p R AKA, severe PAD s/p LLE endarterectomy admitted w left groin infection. On CT chest revealed RML opacity likely representing an endobronchial mass and a 1.4 cm FERNANDO opacity. S/p bronch 5/6/19 w path POSITIVE FOR MALIGNANT CELLS, Non-small cell carcinoma. Not enough tissue to do molecular testing.    5/9/19 Pt seen, asked pt if aware of patholgy results, states he was told "it was nothing to be worried about" Denies hemoptysis, SOB, pain.  5/10/19 Pt in HD this am.     Vital Signs:  Vital Signs Last 24 Hrs  T(C): 36.7 (05-09-19 @ 23:03), Max: 36.7 (05-09-19 @ 17:36)  T(F): 98.1 (05-09-19 @ 23:03), Max: 98.1 (05-09-19 @ 23:03)  HR: 71 (05-09-19 @ 23:03) (70 - 77)  BP: 131/35 (05-09-19 @ 23:03) (123/35 - 131/35)  RR: 18 (05-09-19 @ 23:03) (18 - 20)  SpO2: 95% (05-09-19 @ 23:03) (90% - 95%) on (O2)    Telemetry/Alarms:    Relevant labs, radiology and Medications reviewed                        9.6    14.26 )-----------( 225      ( 09 May 2019 06:45 )             32.8     05-09    140  |  106  |  33<H>  ----------------------------<  94  4.0   |  27  |  3.07<H>    Ca    7.5<L>      09 May 2019 06:45      PT/INR - ( 09 May 2019 06:45 )   PT: 13.4 sec;   INR: 1.20 ratio         PTT - ( 08 May 2019 11:00 )  PTT:77.2 sec  MEDICATIONS  (STANDING):  allopurinol 100 milliGRAM(s) Oral <User Schedule>  artificial  tears Solution 1 Drop(s) Both EYES three times a day  atorvastatin 20 milliGRAM(s) Oral at bedtime  cholestyramine Powder (Sugar-Free) 4 Gram(s) Oral daily  diltiazem    Tablet 60 milliGRAM(s) Oral four times a day  doxercalciferol Injectable 1 MICROGram(s) IV Push <User Schedule>  epoetin nelly Injectable 48167 Unit(s) IV Push <User Schedule>  finasteride 5 milliGRAM(s) Oral daily  gabapentin 300 milliGRAM(s) Oral daily  heparin  Injectable 5000 Unit(s) SubCutaneous every 8 hours  lidocaine/prilocaine Cream 1 Application(s) Topical daily  pantoprazole  Injectable 40 milliGRAM(s) IV Push two times a day  predniSONE   Tablet 5 milliGRAM(s) Oral every other day  predniSONE   Tablet 2.5 milliGRAM(s) Oral every other day  sevelamer carbonate 800 milliGRAM(s) Oral three times a day with meals  silver sulfADIAZINE 1% Cream 1 Application(s) Topical daily  warfarin 5 milliGRAM(s) Oral daily    MEDICATIONS  (PRN):  acetaminophen   Tablet .. 650 milliGRAM(s) Oral every 6 hours PRN Mild Pain (1 - 3)  ALBUTerol    90 MICROgram(s) HFA Inhaler 2 Puff(s) Inhalation every 6 hours PRN Shortness of Breath and/or Wheezing  guaiFENesin   Syrup  (Sugar-Free) 100 milliGRAM(s) Oral every 6 hours PRN Cough  oxyCODONE    5 mG/acetaminophen 325 mG 1 Tablet(s) Oral every 4 hours PRN Moderate Pain (4 - 6)      Physical exam  Gen NAD  Neuro AAOx3  Card RRR  Pulm clear  Abd soft  Ext Rt AKA, RUE thrill AV access, Left leg with dry dressing    I&O's Summary      Assessment  84y Male  w/ PAST MEDICAL & SURGICAL HISTORY:  Above knee amputation of right lower extremity  Recurrent Clostridium difficile diarrhea  Diastolic CHF  Peripheral vascular disease  Afib  Anemia  CKD (chronic kidney disease)  COPD (chronic obstructive pulmonary disease)  SHAYY (obstructive sleep apnea)  Sepsis, due to unspecified organism: 2/2 poorly healing wounds b/l  Dyspepsia: On moderate exertion.  Sleep apnea, obstructive: Requires home 02 therapy, and treatment with BIPAP  Atelectasis  Pleural effusion, bilateral  Respiratory failure  Peripheral edema  CRI (chronic renal insufficiency)  Gout  Benign prostatic hypertrophy  Spinal stenosis  Hypercholesterolemia  GERD (gastroesophageal reflux disease)  CAD (coronary artery disease)  Hypertension  S/P angioplasty with stent  Cataract of left eye  Prostate: Surgery green light procedure.  S/P rotator cuff surgery: Right  S/P angioplasty  admitted with complaints of Patient is a 84y old  Male who presents with a chief complaint of L groin DC (09 May 2019 14:53)  Right endobronchial mass s/p Bronchoscopy, adult      PLAN  pt  presented this am at [facility]   oncology consultation after medicine/pulm discusses w family/pt path results, probably not a candidate for chemotherapy  possible Rad/onc evaluation for pallative radiation   no further surgical intervention    Discussed with Cardiothoracic Team at AM rounds.

## 2019-05-10 NOTE — PROGRESS NOTE ADULT - ASSESSMENT
83 y/o male with ESRD on HD (MWF), HTN, RA on MTX and steroids, R leg amputation, severe CDI and megacolon, Severe PAD s/p LLE endarterectomy (4/1/19) with dr Clement presented with fever (100.5) and DC from L groin. Pt was noted to have lung mass increased in size.       #Lung mass likely benign, malignant, infectious  - s/p bronchoscopy 5/6  - Awaiting [facility] meeting to decide interventions options  - Pathology results positive for malignant cells.  - Repeat imaging in 2-3 months  - CT scan noted R endobronchial mass enlarging from previous CT and new L lung mass  - Pulm, CT surgery consult appreciated    #Surgical site infection s/p endarterectomy 4/1   - Continue daily dressing as recommended by plastic surgery and wound care team.   - Continue pain control with tylenol PRN, tramadol PRN and percocet PRN.   - ID, Vascular and Plastic surgery consult appreciated    #Thyroid Nodule suspicion for malignancy  - Nodule noted on US  - Repeat US in 6 months outpatient. 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 Urbano CURRIE venous doppler - no evidence of DVT; markedly increased velocity in AV fistula of arm raising suspicion of hemodynamically significant stenosis  - Vascular surgery recs appreciated.     #Recurrent C. diff diarrhea with megacolon  - Monitor off abx  - ID recs appreciated    #PAF    - CHADS2 score - less than 5   - no need for bridging  - Continue coumadin    #ESRD on HD M,W,F  -continue dialysis  -Nephro recs appreciated.     #Chronic diastolic CHF   - Last echo 2018 EF of 60-65%.   - Not fluid overloaded    #PVD  - CHADS2 score - less than 5   - no need for bridging, continue coumadin  - Daily INR    #Prophylactic Measure  - On coumadin

## 2019-05-10 NOTE — PROGRESS NOTE ADULT - ASSESSMENT
83 y/o male with ESRD on HD (MWF), HTN, RA on MTX and steroids, R leg amputation, severe CDI and megacolon, Severe PAD s/p LLE endarterectomy 2 weeks ago with dr dugan presents with T 100 and DC from L groin.  Pt also c/o R arm pain/swelling where pt had fistulogram of the AC fistula on last admission.  RUL dopplers negative for DVT.  L groin sono--negative for pseudo aneurysm and + hematoma.  Pt given 2.1 L IVF, IV vanco/aztreonam in ED    TTE (10/18)--mild-mod MR/EF 60%    Pt d/w daughter regarding advance directives--Pt is FULL RESUSCITATION (19 Apr 2019 16:12)  events noted while admitted muscle flap and plastic surgery care developed increased cough with possible bloody streaked sputuim / none present now and RN at bedside, both noticed thhick yellow sputum production  ct scan findings personally reveiwed / old films are not available on PACS at this time for comparison  Continued increase size of tubular oblong opacity in the right middle   lobe, possibly endobronchial mass with impacted airways. Irregular   opacity in the posterior left upper lobe, measuring 1.4 cm    Assessment / plan:  suspected pneumonia with mucus plugging RML  separate FERNANDO pulm nodule seen needs further eval  after antibiotic therapy  ESRD on dilaysis now  Sleep apnea, obstructive: Requires home O2 therapy, and treatment with BIPAP  bibasilar atelectasis with associated small effusions  Prior films reviewed with ct scan abd/pelvis done 7/2018 and 10/2018 with similar RLL findings NOW increased in size which raises suspicion for malignancy higher with this finding with possible endobronchial mass also present / all discussed with his DTR     Appreciate CTS recommendations   s/p BRONCH without bx done 5/6, tolerated well  POSITIVE ENDOBRONCHIAL LESION RML / 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      5/10  pt is awake and alert  Bronch findings along with Brushings being positive for NSC lung cancer discussed with Dtr yesterday afternoon and with pt this am  all their questions are answered  appreciate thoracic surgery presenting his case at [facility] this am  no hemoptysis  radiation oncologist input would also be appreciated in view of Endobronchial mass RML    no hemoptysis  pos brushing for NSC lung ca, favor Sq cell ca  needs further eval for extent of dz  PET scan as outpt required as well as PFT  also additional nodular density FERNANDO, 1.4 cm in size - needs eval    no active sxs and no reported hemoptysis  pt is not a good surgical candidate at this time with infection being treated   I called Anamika yesterday twice and discussed all findings /[phone]  may consider ct abd / pelvis for further evaluation   Oncology eval pending

## 2019-05-10 NOTE — PROGRESS NOTE ADULT - SUBJECTIVE AND OBJECTIVE BOX
NEPHROLOGY INTERVAL HPI/OVERNIGHT EVENTS:  05-10-19 @ 10:13  seen at HD  pulmonary and CTS input noted.    no new c/o appetite fair, no sob, cought improved  no diarrhea    TTE (10/18)--mild-mod MR/EF 60%    Pt d/w daughter regarding advance directives--Pt is FULL RESUSCITATION (2019 16:12)        MEDICATIONS  (STANDING):  allopurinol 100 milliGRAM(s) Oral <User Schedule>  artificial  tears Solution 1 Drop(s) Both EYES three times a day  atorvastatin 20 milliGRAM(s) Oral at bedtime  cholestyramine Powder (Sugar-Free) 4 Gram(s) Oral daily  diltiazem    Tablet 60 milliGRAM(s) Oral four times a day  doxercalciferol Injectable 1 MICROGram(s) IV Push <User Schedule>  epoetin nelly Injectable 76483 Unit(s) IV Push <User Schedule>  finasteride 5 milliGRAM(s) Oral daily  gabapentin 300 milliGRAM(s) Oral daily  heparin  Injectable 5000 Unit(s) SubCutaneous every 8 hours  lidocaine/prilocaine Cream 1 Application(s) Topical daily  pantoprazole  Injectable 40 milliGRAM(s) IV Push two times a day  predniSONE   Tablet 5 milliGRAM(s) Oral every other day  predniSONE   Tablet 2.5 milliGRAM(s) Oral every other day  sevelamer carbonate 800 milliGRAM(s) Oral three times a day with meals  silver sulfADIAZINE 1% Cream 1 Application(s) Topical daily  warfarin 5 milliGRAM(s) Oral daily    MEDICATIONS  (PRN):  acetaminophen   Tablet .. 650 milliGRAM(s) Oral every 6 hours PRN Mild Pain (1 - 3)  ALBUTerol    90 MICROgram(s) HFA Inhaler 2 Puff(s) Inhalation every 6 hours PRN Shortness of Breath and/or Wheezing  guaiFENesin   Syrup  (Sugar-Free) 100 milliGRAM(s) Oral every 6 hours PRN Cough  oxyCODONE    5 mG/acetaminophen 325 mG 1 Tablet(s) Oral every 4 hours PRN Moderate Pain (4 - 6)      Allergies    Zosyn (Rash)    Intolerances        I&O's Detail      Patient was seen and evaluated on dialysis.   Patient is tolerating the procedure well.   Continue dialysis:   Dialyzer: revaclear 300 on 2k with uf goal of 1.5 kg    Vital Signs Last 24 Hrs  T(C): 36.2 (10 May 2019 08:07), Max: 36.8 (10 May 2019 05:51)  T(F): 97.2 (10 May 2019 08:07), Max: 98.2 (10 May 2019 05:51)  HR: 67 (10 May 2019 10:09) (60 - 77)  BP: 130/28 (10 May 2019 10:09) (115/34 - 152/43)  BP(mean): --  RR: 18 (10 May 2019 10:09) (18 - 20)  SpO2: 96% (10 May 2019 05:51) (90% - 96%)  Daily     Daily Weight in k (10 May 2019 05:50)    PHYSICAL EXAM:  General: alert. awake Ox3  HEENT: MMM  CV: s1s2 rrr  LUNGS: B/L ronchi  EXT: no edema    LABS:                        9.5    14.04 )-----------( 227      ( 10 May 2019 08:00 )             31.3     05-10    138  |  105  |  56<H>  ----------------------------<  105<H>  4.1   |  24  |  4.11<H>    Ca    7.4<L>      10 May 2019 08:00  Phos  2.2     10      PT/INR - ( 10 May 2019 08:00 )   PT: 20.5 sec;   INR: 1.81 ratio         PTT - ( 08 May 2019 11:00 )  PTT:77.2 sec    Phosphorus Level, Serum: 2.2 mg/dL (05-10 @ 08:00)

## 2019-05-10 NOTE — PROGRESS NOTE ADULT - ASSESSMENT
83 y/o male with ESRD on HD (MWF), HTN, RA on MTX and steroids, R leg amputation, severe CDI and megacolon, Severe PAD s/p LLE endarterectomy 2 weeks ago with dr dugan presents with T 100 and DC from L groin.  Pt also c/o R arm pain/swelling where pt had fistulogram of the AC fistula on last admission.  RUL dopplers negative for DVT.  L groin sono--negative for pseudo aneurysm and + hematoma.  Pt given 2.1 L IVF, IV vanco/aztreonam in ED  On my exam in HD suite, awake, chronically ill appearing, NAD, daughter at bedside. Pt seen by Dr dugan in ED.  CXR--clear    4/20  s/p hd yesterday  prolonged bleed from access stopped w pressure  feels well  arm less tender  received 1 u prbc on hd yesterday  monitor cbc    4/22 SY  --ESRD : HD order and tx reviewed.   Tolerating tx well.  --AVF : as above.  Prolonged bleeding post tx.  Venous pressure acceptable today.  Monitor AVF function.  --Anemia : with acute loss.  Active infection leading to LETICIA hyporesponsiveness.  Transfuse 2 units today.  --PAD : post Left Ileofemoral Endarterectomy : Monitor Left foot perfusion.  --ID ; Left Groin infection : Continue abtx.  For debridement in am.    4/23  as above  For HD Wednesday  groin wash out today    4/24 SY  --ESRD : HD order and tx reviewed.  Tolerating tx well.  AVF cannulated without difficulty.  --Left Groin wound --Post  Muscle Flap Coverage.  Continue abtx.  --PAD : monitor perfusion.  --ID : continue Cefepime.    4/25 SY  --ESRD : Continue TIW HD  --AVF : cannulated without difficulty.  RUE edema much improved.  --Left Groin wound : post Muscle Flap Coverage.   Continue abtx.  --ID: continue abtx.  --PAD : post Left Ileofemoral endarterectomy : monitor perfusion.    4/26  seen on hd  in good spirits   stable on hd  fluid removal reduced due to low BP  hgb stable    4/27 SY  --ESRD : Continue TIW HD  --AVF : functioning well.  --PAD : Post Left Ileofemoral Endarterectomy : monitor perfusion  --Left Groin Wound : post Muscle Flap Coverage : continue abtx and wound care.    4/28 SY  --ESRD : for HD in am  --AVF : functioning well with improved cannulation.  --PAD : monitor perfusion ( Post Left Ileofemoral Endarterectomy)  --Left Groin wound : post Muscle Flap : continue abtx and wound care.    4/29 MK   - ESRD: tolerting hd, AVF cannultion well  - inc sputum production with possible endobronchial mass: dr bush input noted, may need bronch  - AMS with more confusion and jerking motion: pain meds have been limited, will get ammonia and abg value, has not been using bipap during hospitalization  - [place]: fu h/h   - left groin wound s/p muscle flap coverage with s/p left ileofemoral endarterectomy      4/30 SY  --ESRD : Continue TIW HD  --Pulm : Hemoptysis resolved.  New RML PNA and FERNANDO nodule   improved resp status . Now PNA with mucus plugging and new findings of lesion.  For outpt work up once clinically improved.  --AMS ;resolved and at baseline.  Attempt to minimize pain meds.  --PAD : Monitor Left groin and LE wound.  --ID : continue Cefepime and Daptomycin with po vanco for C diff prophylaxis.    5/1 SY  --ESRD :HD order and tx reviewed.    --Pulm : New RML PNA and FERNANDO nodule.  Pulmonary follow up appreciated.  Continue ABTX.  --AMS : resolved and stable.  --Increasing Leukocytosis : continue abtx.  D/w Dr Flanagan.  --PAD : LLE pain improved. (post Left Ileofemoral endarterectomy last admission.    5/2  Some av access arm pain last hd improved w repositioning  stable vitals  pulmonary/ thoracic surgery input noted  d/w Pt daughter    5/3  arm feels better today  for dialysis later  sacral evaluation for possible intervention    5/4 MK  - ESRD on hd: tolerating uf with hd   - Afib on AC: cardizem and titrate as needed  - Anemia: epo   - PNA with mucus plug and possible endobronchial lesion, with rising leukocytosis await timing of bronch  - rising leukocytosis with hx of cdiff: no diarrhea   LM with dr bush   bronch to be done by CTS, Dr malave, if ffp needed, will do hd with ffp in am followed by bronch ...    5/7 SY  --RML opacity : post Bronchoscopy .  Positive for endobronchial lesion.   no biopsy done.   Follow up Cytology/Cultures.  Further options to be discussed with family.  --ESRD : for HD in am.  --A FIB : Continue Cardizem for rate control and to restart a/c,  --Leukocytosis : Increasing  : Post Bronch.  Continue abtx.    5/8 SY  --ESRD : HD order and tx reviewed.  Tolerating tx well.  --Pulmonary : Positive Endobronchial lesion.  No further intervention at present and follow up as outpt,  --A FIB : continue Cardizem.  Restarted on A/C.  --Leukocytosis ;  Slightly improved. Off all abtx since 5/2.  Remains afebrile.    5/9  ESRD  on HD   For out pt RET scan, endobronchial lesion   HD am    5/10 MK   - ESRD tolerating hd   - MBD: dc sevelamer, cont hectrol   - + endobronchial lesion: as per pul/cts await onc input.    - stable leukocytosis, off abx 83 y/o male with ESRD on HD (MWF), HTN, RA on MTX and steroids, R leg amputation, severe CDI and megacolon, Severe PAD s/p LLE endarterectomy 2 weeks ago with dr dugan presents with T 100 and DC from L groin.  Pt also c/o R arm pain/swelling where pt had fistulogram of the AC fistula on last admission.  RUL dopplers negative for DVT.  L groin sono--negative for pseudo aneurysm and + hematoma.  Pt given 2.1 L IVF, IV vanco/aztreonam in ED  On my exam in HD suite, awake, chronically ill appearing, NAD, daughter at bedside. Pt seen by Dr dugan in ED.  CXR--clear    4/20  s/p hd yesterday  prolonged bleed from access stopped w pressure  feels well  arm less tender  received 1 u prbc on hd yesterday  monitor cbc    4/22 SY  --ESRD : HD order and tx reviewed.   Tolerating tx well.  --AVF : as above.  Prolonged bleeding post tx.  Venous pressure acceptable today.  Monitor AVF function.  --Anemia : with acute loss.  Active infection leading to LETICIA hyporesponsiveness.  Transfuse 2 units today.  --PAD : post Left Ileofemoral Endarterectomy : Monitor Left foot perfusion.  --ID ; Left Groin infection : Continue abtx.  For debridement in am.    4/23  as above  For HD Wednesday  groin wash out today    4/24 SY  --ESRD : HD order and tx reviewed.  Tolerating tx well.  AVF cannulated without difficulty.  --Left Groin wound --Post  Muscle Flap Coverage.  Continue abtx.  --PAD : monitor perfusion.  --ID : continue Cefepime.    4/25 SY  --ESRD : Continue TIW HD  --AVF : cannulated without difficulty.  RUE edema much improved.  --Left Groin wound : post Muscle Flap Coverage.   Continue abtx.  --ID: continue abtx.  --PAD : post Left Ileofemoral endarterectomy : monitor perfusion.    4/26  seen on hd  in good spirits   stable on hd  fluid removal reduced due to low BP  hgb stable    4/27 SY  --ESRD : Continue TIW HD  --AVF : functioning well.  --PAD : Post Left Ileofemoral Endarterectomy : monitor perfusion  --Left Groin Wound : post Muscle Flap Coverage : continue abtx and wound care.    4/28 SY  --ESRD : for HD in am  --AVF : functioning well with improved cannulation.  --PAD : monitor perfusion ( Post Left Ileofemoral Endarterectomy)  --Left Groin wound : post Muscle Flap : continue abtx and wound care.    4/29 MK   - ESRD: tolerting hd, AVF cannultion well  - inc sputum production with possible endobronchial mass: dr bush input noted, may need bronch  - AMS with more confusion and jerking motion: pain meds have been limited, will get ammonia and abg value, has not been using bipap during hospitalization  - [place]: fu h/h   - left groin wound s/p muscle flap coverage with s/p left ileofemoral endarterectomy      4/30 SY  --ESRD : Continue TIW HD  --Pulm : Hemoptysis resolved.  New RML PNA and FERNANDO nodule   improved resp status . Now PNA with mucus plugging and new findings of lesion.  For outpt work up once clinically improved.  --AMS ;resolved and at baseline.  Attempt to minimize pain meds.  --PAD : Monitor Left groin and LE wound.  --ID : continue Cefepime and Daptomycin with po vanco for C diff prophylaxis.    5/1 SY  --ESRD :HD order and tx reviewed.    --Pulm : New RML PNA and FERNANDO nodule.  Pulmonary follow up appreciated.  Continue ABTX.  --AMS : resolved and stable.  --Increasing Leukocytosis : continue abtx.  D/w Dr Flanagan.  --PAD : LLE pain improved. (post Left Ileofemoral endarterectomy last admission.    5/2  Some av access arm pain last hd improved w repositioning  stable vitals  pulmonary/ thoracic surgery input noted  d/w Pt daughter    5/3  arm feels better today  for dialysis later  sacral evaluation for possible intervention    5/4 MK  - ESRD on hd: tolerating uf with hd   - Afib on AC: cardizem and titrate as needed  - Anemia: epo   - PNA with mucus plug and possible endobronchial lesion, with rising leukocytosis await timing of bronch  - rising leukocytosis with hx of cdiff: no diarrhea   LM with dr bush   bronch to be done by CTS, Dr malave, if ffp needed, will do hd with ffp in am followed by bronch ...    5/7 SY  --RML opacity : post Bronchoscopy .  Positive for endobronchial lesion.   no biopsy done.   Follow up Cytology/Cultures.  Further options to be discussed with family.  --ESRD : for HD in am.  --A FIB : Continue Cardizem for rate control and to restart a/c,  --Leukocytosis : Increasing  : Post Bronch.  Continue abtx.    5/8 SY  --ESRD : HD order and tx reviewed.  Tolerating tx well.  --Pulmonary : Positive Endobronchial lesion.  No further intervention at present and follow up as outpt,  --A FIB : continue Cardizem.  Restarted on A/C.  --Leukocytosis ;  Slightly improved. Off all abtx since 5/2.  Remains afebrile.    5/9  ESRD  on HD   For out pt PET scan, endobronchial lesion   HD am    5/10 MK   - ESRD tolerating hd   - MBD: dc sevelamer, cont hectrol   - + endobronchial lesion: as per pul/cts await onc input.    - stable leukocytosis, off abx

## 2019-05-10 NOTE — PROGRESS NOTE ADULT - SUBJECTIVE AND OBJECTIVE BOX
Patient is a 84y old  Male who presents with a chief complaint of L groin DC (28 Apr 2019 17:14)      HPI:  85 y/o male with ESRD on HD (MWF), HTN, RA on MTX and steroids, R leg amputation, severe CDI and megacolon, Severe PAD s/p LLE endarterectomy 2 weeks ago with dr dugan presents with T 100 and DC from L groin.  Pt also c/o R arm pain/swelling where pt had fistulogram of the AC fistula on last admission.  RUL dopplers negative for DVT.  L groin sono--negative for pseudo aneurysm and + hematoma.  Pt given 2.1 L IVF, IV vanco/aztreonam in ED  On my exam in HD suite, awake, chronically ill appearing, NAD, daughter at bedside. Pt seen by Dr dugan in ED.  CXR--clear    TTE (10/18)--mild-mod MR/EF 60%    Pt d/w daughter regarding advance directives--Pt is FULL RESUSCITATION (19 Apr 2019 16:12)  events noted while admitted muscle flap and plastic surgery care developed increased cough with possible bloody streaked sputuim / none present now and RN at bedside, both noticed thhick yellow sputum production    4/30  data discussed with RN at bedside  no cp  improved breathing    5/1  feels the same  decreased cough  DTR is contacted and data discussed regarding abnormal ct scan findings  5/2  data discussed with pt's DTR and medical team as well  although BRONCH is appropriate given ct scan findings, it can not be done yet till INR corrected specially in pt with ESRD.  DATA DISCUSSED WITH DR JORGE MASSEY AS WELL for thoracic surgery consult  5/3  data discussed with thoracic surgery yesterday  discussed with dr Rashid today  pt is resting comfortably  no resp distress  no hemoptysis    5/4  No acute events occurred overnight  Discussed with hospitalist/resident team and daughter     5/5  INR still elevated   No acute pulmonary events occurred overnight  5/6 uneventful night  in dialysis  bronch postponed till INR corrected  5/7  s/p BRONCH without bx yesterday afternoon  tolerated well  POSITIVE ENDOBRONCHIAL LESION RML  no hemoptysis this am  feels ok  5/8  Bronch findings discussed with pt  plan rev with thoracic surgery with plan for fu imaging in 2-3 months  no active sxs and no reported hemoptysis  pt is not a good surgical candidate at this time  5/9  pt is resting comfortably  no hemoptysis  will need to discuss path data with family  pos brushing for NSC lung ca, favor Sq cell ca  5/10  pt is awake and alert  Bronch findings along with Brushings being positive for NSC lung cancer discussed with Dtr yesterday afternoon and with pt this am  all their questions are answered  appreciate thoracic surgery presenting his case at [facility] this am  no hemoptysis  radiation oncologist input would also be appreciated in view of Endobronchial mass RML  MEDICATIONS  (STANDING):  allopurinol 100 milliGRAM(s) Oral <User Schedule>  artificial tears (preservative free) Ophthalmic Solution 1 Drop(s) Both EYES three times a day  atorvastatin 20 milliGRAM(s) Oral at bedtime  cholestyramine Powder (Sugar-Free) 4 Gram(s) Oral daily  diltiazem    Tablet 60 milliGRAM(s) Oral four times a day  doxercalciferol Injectable 1 MICROGram(s) IV Push <User Schedule>  epoetin nelly Injectable 34173 Unit(s) IV Push <User Schedule>  finasteride 5 milliGRAM(s) Oral daily  gabapentin 300 milliGRAM(s) Oral daily  hydrocortisone sodium succinate Injectable 100 milliGRAM(s) IV Push every 8 hours  lidocaine/prilocaine Cream 1 Application(s) Topical daily  pantoprazole    Tablet 40 milliGRAM(s) Oral before breakfast  predniSONE   Tablet 5 milliGRAM(s) Oral every other day  predniSONE   Tablet 2.5 milliGRAM(s) Oral every other day  sevelamer carbonate 800 milliGRAM(s) Oral three times a day with meals  silver sulfADIAZINE 1% Cream 1 Application(s) Topical daily    MEDICATIONS  (PRN):  acetaminophen   Tablet .. 650 milliGRAM(s) Oral every 6 hours PRN Temp greater or equal to 38.5C (101.3F)  acetaminophen   Tablet .. 650 milliGRAM(s) Oral every 6 hours PRN Mild Pain (1 - 3)  ALBUTerol    90 MICROgram(s) HFA Inhaler 2 Puff(s) Inhalation every 6 hours PRN Shortness of Breath and/or Wheezing  guaiFENesin   Syrup  (Sugar-Free) 100 milliGRAM(s) Oral every 6 hours PRN Cough  oxyCODONE    5 mG/acetaminophen 325 mG 1 Tablet(s) Oral every 4 hours PRN Moderate Pain (4 - 6)    Vital Signs Last 24 Hrs  T(C): 36.5 (07 May 2019 05:40), Max: 37.3 (06 May 2019 16:46)  T(F): 97.7 (07 May 2019 05:40), Max: 99.1 (06 May 2019 16:46)  HR: 63 (07 May 2019 05:40) (63 - 179)  BP: 133/48 (07 May 2019 05:40) (107/61 - 167/43)  BP(mean): --  RR: 17 (07 May 2019 05:40) (14 - 20)  SpO2: 94% (07 May 2019 05:40) (89% - 99%)  PHYSICAL EXAM    HEENT: PERRL, conjunctiva clear, EOM's intact, non injected pharynx, no exudate, TM   normal  Neck: Supple, , no adenopathy, thyroid: not enlarged, no carotid bruit or JVD  Back: Symmetric, no  tenderness,no soft tissue tenderness  Lungs: Clear to auscultation bilateral,no adventitious breath sounds, normal   expiratory phase  Heart: Regular rate and rhythm, S1, S2 normal, no murmur, rub or gallop  Abdomen: Soft, non-tender, bowel sounds active , no hepatosplenomegaly  Extremities: no cyanosis or edema, no joint swelling, hx AKA right side  Skin: Skin color, texture normal, no rashes   Neurologic: Alert and oriented X3 , cranial nerves intact, sensory and motor normal,    ECG:    LABS:                                 10.7   20.09 )-----------( 361      ( 06 May 2019 07:45 )             34.9   05-06    137  |  105  |  48<H>  ----------------------------<  90  4.3   |  23  |  4.78<H>    Ca    8.1<L>      06 May 2019 07:45  Phos  4.1     05-06    TPro  x   /  Alb  2.0<L>  /  TBili  x   /  DBili  x   /  AST  x   /  ALT  x   /  AlkPhos  x   05-06               9.8    16.81 )-----------( 443      ( 01 May 2019 06:35 )             32.1   PT/INR - ( 06 May 2019 07:45 )   PT: 19.4 sec;   INR: 1.72 ratio                                 9.9    13.16 )-----------( 358      ( 30 Apr 2019 06:08 )             32.1                           10.1   7.96  )-----------( 334      ( 29 Apr 2019 07:08 )             33.2     04-29 04-30    142  |  111<H>  |  19  ----------------------------<  87  3.8   |  24  |  2.70<H>    Ca    8.3<L>      30 Apr 2019 06:08  Phos  3.4     04-29    TPro  x   /  Alb  1.8<L>  /  TBili  x   /  DBili  x   /  AST  x   /  ALT  x   /  AlkPhos  x   04-29  138  |  108  |  35<H>  ----------------------------<  95  4.4   |  22  |  4.61<H>    Ca    8.0<L>      29 Apr 2019 07:08              PT/INR - ( 29 Apr 2019 07:08 )   PT: 39.9 sec;   INR: 3.46 ratio                   RADIOLOGY & ADDITIONAL STUDIES:  < from: CT Chest No Cont (04.28.19 @ 12:17) >  PROCEDURE:   CT of the Chest was performed without intravenous contrast.  Sagittal and coronal reformats were performed.    FINDINGS:    LUNGS AND LARGE AIRWAYS: Emphysema. Continued increase size of tubular   oblong opacity in the right middle lobe, possibly endobronchial mass with   impacted airways. Possible associated broncholiths. Irregular opacity in   the posterior left upper lobe (series 3, image 60), measuring 1.4 cm.  PLEURA: Small left pleural effusion. Trace right pleural effusion.  VESSELS: Atherosclerotic calcifications.   HEART: Heart size is normal. No pericardial effusion.  MEDIASTINUM AND [place]: No lymphadenopathy.  CHEST WALL AND LOWER NECK: Left thyroid nodule, measuring 2.9 cm.   Extension of the thyroid gland into the superior mediastinum.   VISUALIZED UPPER ABDOMEN: Cholelithiasis. Unchanged probable cyst in   hepatic segment 8.  BONES: Degenerative changes of the spine.    IMPRESSION:   Continued increase size of tubular oblong opacity in the right middle   lobe, possibly endobronchial mass with impacted airways. Irregular   opacity in the posterior left upper lobe, measuring 1.4 cm. PET/CT is   recommended for further evaluation.    Left thyroid nodule, measuring 2.9 cm. Further evaluation with thyroid   ultrasound is recommended.    < end of copied text >

## 2019-05-11 LAB
ANION GAP SERPL CALC-SCNC: 5 MMOL/L — SIGNIFICANT CHANGE UP (ref 5–17)
BUN SERPL-MCNC: 33 MG/DL — HIGH (ref 7–23)
CALCIUM SERPL-MCNC: 7.7 MG/DL — LOW (ref 8.5–10.1)
CHLORIDE SERPL-SCNC: 107 MMOL/L — SIGNIFICANT CHANGE UP (ref 96–108)
CO2 SERPL-SCNC: 27 MMOL/L — SIGNIFICANT CHANGE UP (ref 22–31)
CREAT SERPL-MCNC: 2.75 MG/DL — HIGH (ref 0.5–1.3)
GLUCOSE SERPL-MCNC: 77 MG/DL — SIGNIFICANT CHANGE UP (ref 70–99)
HCT VFR BLD CALC: 31.8 % — LOW (ref 39–50)
HGB BLD-MCNC: 9.5 G/DL — LOW (ref 13–17)
INR BLD: 2.97 RATIO — HIGH (ref 0.88–1.16)
MCHC RBC-ENTMCNC: 29.9 GM/DL — LOW (ref 32–36)
MCHC RBC-ENTMCNC: 31.5 PG — SIGNIFICANT CHANGE UP (ref 27–34)
MCV RBC AUTO: 105.3 FL — HIGH (ref 80–100)
NRBC # BLD: 0 /100 WBCS — SIGNIFICANT CHANGE UP (ref 0–0)
PLATELET # BLD AUTO: 188 K/UL — SIGNIFICANT CHANGE UP (ref 150–400)
POTASSIUM SERPL-MCNC: 3.9 MMOL/L — SIGNIFICANT CHANGE UP (ref 3.5–5.3)
POTASSIUM SERPL-SCNC: 3.9 MMOL/L — SIGNIFICANT CHANGE UP (ref 3.5–5.3)
PROTHROM AB SERPL-ACNC: 34.1 SEC — HIGH (ref 10–12.9)
RBC # BLD: 3.02 M/UL — LOW (ref 4.2–5.8)
RBC # FLD: 22.5 % — HIGH (ref 10.3–14.5)
SODIUM SERPL-SCNC: 139 MMOL/L — SIGNIFICANT CHANGE UP (ref 135–145)
WBC # BLD: 12.14 K/UL — HIGH (ref 3.8–10.5)
WBC # FLD AUTO: 12.14 K/UL — HIGH (ref 3.8–10.5)

## 2019-05-11 RX ORDER — DILTIAZEM HCL 120 MG
60 CAPSULE, EXT RELEASE 24 HR ORAL
Refills: 0 | Status: DISCONTINUED | OUTPATIENT
Start: 2019-05-11 | End: 2019-05-15

## 2019-05-11 RX ADMIN — ATORVASTATIN CALCIUM 20 MILLIGRAM(S): 80 TABLET, FILM COATED ORAL at 21:44

## 2019-05-11 RX ADMIN — OXYCODONE AND ACETAMINOPHEN 1 TABLET(S): 5; 325 TABLET ORAL at 18:34

## 2019-05-11 RX ADMIN — HEPARIN SODIUM 5000 UNIT(S): 5000 INJECTION INTRAVENOUS; SUBCUTANEOUS at 06:54

## 2019-05-11 RX ADMIN — FINASTERIDE 5 MILLIGRAM(S): 5 TABLET, FILM COATED ORAL at 12:18

## 2019-05-11 RX ADMIN — OXYCODONE AND ACETAMINOPHEN 1 TABLET(S): 5; 325 TABLET ORAL at 13:00

## 2019-05-11 RX ADMIN — Medication 1 DROP(S): at 21:44

## 2019-05-11 RX ADMIN — OXYCODONE AND ACETAMINOPHEN 1 TABLET(S): 5; 325 TABLET ORAL at 06:54

## 2019-05-11 RX ADMIN — HEPARIN SODIUM 5000 UNIT(S): 5000 INJECTION INTRAVENOUS; SUBCUTANEOUS at 18:07

## 2019-05-11 RX ADMIN — GABAPENTIN 300 MILLIGRAM(S): 400 CAPSULE ORAL at 12:18

## 2019-05-11 RX ADMIN — Medication 100 MILLIGRAM(S): at 12:20

## 2019-05-11 RX ADMIN — CHOLESTYRAMINE 4 GRAM(S): 4 POWDER, FOR SUSPENSION ORAL at 11:14

## 2019-05-11 RX ADMIN — Medication 5 MILLIGRAM(S): at 12:18

## 2019-05-11 RX ADMIN — Medication 60 MILLIGRAM(S): at 12:18

## 2019-05-11 RX ADMIN — Medication 1 DROP(S): at 06:54

## 2019-05-11 RX ADMIN — Medication 2.5 MILLIGRAM(S): at 12:18

## 2019-05-11 RX ADMIN — PANTOPRAZOLE SODIUM 40 MILLIGRAM(S): 20 TABLET, DELAYED RELEASE ORAL at 06:54

## 2019-05-11 RX ADMIN — Medication 60 MILLIGRAM(S): at 03:25

## 2019-05-11 RX ADMIN — Medication 60 MILLIGRAM(S): at 06:54

## 2019-05-11 RX ADMIN — Medication 1 APPLICATION(S): at 12:21

## 2019-05-11 RX ADMIN — OXYCODONE AND ACETAMINOPHEN 1 TABLET(S): 5; 325 TABLET ORAL at 06:55

## 2019-05-11 RX ADMIN — HEPARIN SODIUM 5000 UNIT(S): 5000 INJECTION INTRAVENOUS; SUBCUTANEOUS at 21:44

## 2019-05-11 RX ADMIN — OXYCODONE AND ACETAMINOPHEN 1 TABLET(S): 5; 325 TABLET ORAL at 12:18

## 2019-05-11 NOTE — PROGRESS NOTE ADULT - ASSESSMENT
83 y/o male with ESRD on HD (MWF), HTN, RA on MTX and steroids, R leg amputation, severe CDI and megacolon, Severe PAD s/p LLE endarterectomy 2 weeks ago with dr dugan presents with T 100 and DC from L groin.  Pt also c/o R arm pain/swelling where pt had fistulogram of the AC fistula on last admission.  RUL dopplers negative for DVT.  L groin sono--negative for pseudo aneurysm and + hematoma.  Pt given 2.1 L IVF, IV vanco/aztreonam in ED    TTE (10/18)--mild-mod MR/EF 60%    Pt d/w daughter regarding advance directives--Pt is FULL RESUSCITATION (19 Apr 2019 16:12)  events noted while admitted muscle flap and plastic surgery care developed increased cough with possible bloody streaked sputuim / none present now and RN at bedside, both noticed thhick yellow sputum production  ct scan findings personally reveiwed / old films are not available on PACS at this time for comparison  Continued increase size of tubular oblong opacity in the right middle   lobe, possibly endobronchial mass with impacted airways. Irregular   opacity in the posterior left upper lobe, measuring 1.4 cm    Assessment / plan:  suspected pneumonia with mucus plugging RML  separate FERNANDO pulm nodule seen needs further eval  after antibiotic therapy  ESRD on dilaysis now  Sleep apnea, obstructive: Requires home O2 therapy, and treatment with BIPAP  bibasilar atelectasis with associated small effusions  Prior films reviewed with ct scan abd/pelvis done 7/2018 and 10/2018 with similar RLL findings NOW increased in size which raises suspicion for malignancy higher with this finding with possible endobronchial mass also present / all discussed with his DTR     Appreciate CTS recommendations   s/p BRONCH without bx done 5/6, tolerated well  POSITIVE ENDOBRONCHIAL LESION RML / 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      5/11  pt is awake and alert  Bronch findings along with Brushings being positive for NSC lung cancer discussed with Dtr yesterday afternoon and with pt this am  all their questions are answered  appreciate thoracic surgery presenting his case at [facility] this am  no hemoptysis  radiation oncologist input would also be appreciated in view of Endobronchial mass RML    no hemoptysis  pos brushing for NSC lung ca, favor Sq cell ca  needs further eval for extent of dz  PET scan as outpt required as well as PFT  also additional nodular density FERNANDO, 1.4 cm in size - needs eval    no active sxs and no reported hemoptysis  pt is not a good surgical candidate at this time with infection being treated   I called Anamika yesterday twice and discussed all findings /[phone]  I spoke with family at bedside today 5/11  may consider ct abd / pelvis for further evaluation   Oncology eval pending

## 2019-05-11 NOTE — PROGRESS NOTE ADULT - SUBJECTIVE AND OBJECTIVE BOX
Patient is a 84y old  Male who presents with a chief complaint of L groin DC (28 Apr 2019 17:14)      HPI:  83 y/o male with ESRD on HD (MWF), HTN, RA on MTX and steroids, R leg amputation, severe CDI and megacolon, Severe PAD s/p LLE endarterectomy 2 weeks ago with dr dugan presents with T 100 and DC from L groin.  Pt also c/o R arm pain/swelling where pt had fistulogram of the AC fistula on last admission.  RUL dopplers negative for DVT.  L groin sono--negative for pseudo aneurysm and + hematoma.  Pt given 2.1 L IVF, IV vanco/aztreonam in ED  On my exam in HD suite, awake, chronically ill appearing, NAD, daughter at bedside. Pt seen by Dr dugan in ED.  CXR--clear    TTE (10/18)--mild-mod MR/EF 60%    Pt d/w daughter regarding advance directives--Pt is FULL RESUSCITATION (19 Apr 2019 16:12)  events noted while admitted muscle flap and plastic surgery care developed increased cough with possible bloody streaked sputuim / none present now and RN at bedside, both noticed thhick yellow sputum production    4/30  data discussed with RN at bedside  no cp  improved breathing    5/1  feels the same  decreased cough  DTR is contacted and data discussed regarding abnormal ct scan findings  5/2  data discussed with pt's DTR and medical team as well  although BRONCH is appropriate given ct scan findings, it can not be done yet till INR corrected specially in pt with ESRD.  DATA DISCUSSED WITH DR JORGE MASSEY AS WELL for thoracic surgery consult  5/3  data discussed with thoracic surgery yesterday  discussed with dr Rashid today  pt is resting comfortably  no resp distress  no hemoptysis    5/4  No acute events occurred overnight  Discussed with hospitalist/resident team and daughter     5/5  INR still elevated   No acute pulmonary events occurred overnight  5/6 uneventful night  in dialysis  bronch postponed till INR corrected  5/7  s/p BRONCH without bx yesterday afternoon  tolerated well  POSITIVE ENDOBRONCHIAL LESION RML  no hemoptysis this am  feels ok  5/8  Bronch findings discussed with pt  plan rev with thoracic surgery with plan for fu imaging in 2-3 months  no active sxs and no reported hemoptysis  pt is not a good surgical candidate at this time  5/9  pt is resting comfortably  no hemoptysis  will need to discuss path data with family  pos brushing for NSC lung ca, favor Sq cell ca  5/10  pt is awake and alert  Bronch findings along with Brushings being positive for NSC lung cancer discussed with Dtr yesterday afternoon and with pt this am  all their questions are answered  appreciate thoracic surgery presenting his case at [facility] this am  no hemoptysis  radiation oncologist input would also be appreciated in view of Endobronchial mass RML  5/11  all recent data discussed with pt and his family at bedside today  questions answered  RML lesion/ mass as well as FERNANDO pulm nodule discussed  no hemoptysis  MEDICATIONS  (STANDING):  allopurinol 100 milliGRAM(s) Oral <User Schedule>  artificial tears (preservative free) Ophthalmic Solution 1 Drop(s) Both EYES three times a day  atorvastatin 20 milliGRAM(s) Oral at bedtime  cholestyramine Powder (Sugar-Free) 4 Gram(s) Oral daily  diltiazem    Tablet 60 milliGRAM(s) Oral four times a day  doxercalciferol Injectable 1 MICROGram(s) IV Push <User Schedule>  epoetin nelly Injectable 90773 Unit(s) IV Push <User Schedule>  finasteride 5 milliGRAM(s) Oral daily  gabapentin 300 milliGRAM(s) Oral daily  hydrocortisone sodium succinate Injectable 100 milliGRAM(s) IV Push every 8 hours  lidocaine/prilocaine Cream 1 Application(s) Topical daily  pantoprazole    Tablet 40 milliGRAM(s) Oral before breakfast  predniSONE   Tablet 5 milliGRAM(s) Oral every other day  predniSONE   Tablet 2.5 milliGRAM(s) Oral every other day  sevelamer carbonate 800 milliGRAM(s) Oral three times a day with meals  silver sulfADIAZINE 1% Cream 1 Application(s) Topical daily    MEDICATIONS  (PRN):  acetaminophen   Tablet .. 650 milliGRAM(s) Oral every 6 hours PRN Temp greater or equal to 38.5C (101.3F)  acetaminophen   Tablet .. 650 milliGRAM(s) Oral every 6 hours PRN Mild Pain (1 - 3)  ALBUTerol    90 MICROgram(s) HFA Inhaler 2 Puff(s) Inhalation every 6 hours PRN Shortness of Breath and/or Wheezing  guaiFENesin   Syrup  (Sugar-Free) 100 milliGRAM(s) Oral every 6 hours PRN Cough  oxyCODONE    5 mG/acetaminophen 325 mG 1 Tablet(s) Oral every 4 hours PRN Moderate Pain (4 - 6)    Vital Signs Last 24 Hrs  T(C): 36.9 (11 May 2019 05:20), Max: 37.3 (10 May 2019 16:27)  T(F): 98.4 (11 May 2019 05:20), Max: 99.2 (10 May 2019 16:27)  HR: 64 (11 May 2019 06:51) (61 - 80)  BP: 138/34 (11 May 2019 06:51) (99/29 - 168/90)  BP(mean): --  RR: 18 (11 May 2019 05:20) (16 - 18)  SpO2: 100% (11 May 2019 05:20) (96% - 100%)  HEENT: PERRL, conjunctiva clear, EOM's intact, non injected pharynx, no exudate, TM   normal  Neck: Supple, , no adenopathy, thyroid: not enlarged, no carotid bruit or JVD  Back: Symmetric, no  tenderness,no soft tissue tenderness  Lungs: Clear to auscultation bilateral,no adventitious breath sounds, normal   expiratory phase  Heart: Regular rate and rhythm, S1, S2 normal, no murmur, rub or gallop  Abdomen: Soft, non-tender, bowel sounds active , no hepatosplenomegaly  Extremities: no cyanosis or edema, no joint swelling, hx AKA right side  Skin: Skin color, texture normal, no rashes   Neurologic: Alert and oriented X3 , cranial nerves intact, sensory and motor normal,    ECG:    LABS:                                 10.7   20.09 )-----------( 361      ( 06 May 2019 07:45 )             34.9   05-06    137  |  105  |  48<H>  ----------------------------<  90  4.3   |  23  |  4.78<H>    Ca    8.1<L>      06 May 2019 07:45  Phos  4.1     05-06    TPro  x   /  Alb  2.0<L>  /  TBili  x   /  DBili  x   /  AST  x   /  ALT  x   /  AlkPhos  x   05-06               9.8    16.81 )-----------( 443      ( 01 May 2019 06:35 )             32.1   PT/INR - ( 06 May 2019 07:45 )   PT: 19.4 sec;   INR: 1.72 ratio                                 9.9    13.16 )-----------( 358      ( 30 Apr 2019 06:08 )             32.1                           10.1   7.96  )-----------( 334      ( 29 Apr 2019 07:08 )             33.2     04-29 04-30    142  |  111<H>  |  19  ----------------------------<  87  3.8   |  24  |  2.70<H>    Ca    8.3<L>      30 Apr 2019 06:08  Phos  3.4     04-29    TPro  x   /  Alb  1.8<L>  /  TBili  x   /  DBili  x   /  AST  x   /  ALT  x   /  AlkPhos  x   04-29  138  |  108  |  35<H>  ----------------------------<  95  4.4   |  22  |  4.61<H>    Ca    8.0<L>      29 Apr 2019 07:08              PT/INR - ( 29 Apr 2019 07:08 )   PT: 39.9 sec;   INR: 3.46 ratio                   RADIOLOGY & ADDITIONAL STUDIES:  < from: CT Chest No Cont (04.28.19 @ 12:17) >  PROCEDURE:   CT of the Chest was performed without intravenous contrast.  Sagittal and coronal reformats were performed.    FINDINGS:    LUNGS AND LARGE AIRWAYS: Emphysema. Continued increase size of tubular   oblong opacity in the right middle lobe, possibly endobronchial mass with   impacted airways. Possible associated broncholiths. Irregular opacity in   the posterior left upper lobe (series 3, image 60), measuring 1.4 cm.  PLEURA: Small left pleural effusion. Trace right pleural effusion.  VESSELS: Atherosclerotic calcifications.   HEART: Heart size is normal. No pericardial effusion.  MEDIASTINUM AND [place]: No lymphadenopathy.  CHEST WALL AND LOWER NECK: Left thyroid nodule, measuring 2.9 cm.   Extension of the thyroid gland into the superior mediastinum.   VISUALIZED UPPER ABDOMEN: Cholelithiasis. Unchanged probable cyst in   hepatic segment 8.  BONES: Degenerative changes of the spine.    IMPRESSION:   Continued increase size of tubular oblong opacity in the right middle   lobe, possibly endobronchial mass with impacted airways. Irregular   opacity in the posterior left upper lobe, measuring 1.4 cm. PET/CT is   recommended for further evaluation.    Left thyroid nodule, measuring 2.9 cm. Further evaluation with thyroid   ultrasound is recommended.    < end of copied text >

## 2019-05-11 NOTE — PROGRESS NOTE ADULT - SUBJECTIVE AND OBJECTIVE BOX
[facility] consult   HPI:  85 y/o male with ESRD on HD (MWF), HTN, RA on MTX and steroids, R leg amputation, severe CDI and megacolon, Severe PAD s/p LLE endarterectomy 2 weeks ago with dr dugan presents with T 100 and DC from L groin.  Pt also c/o R arm pain/swelling where pt had fistulogram of the AC fistula on last admission.  RUL dopplers negative for DVT.  L groin sono--negative for pseudo aneurysm and + hematoma.  Pt given 2.1 L IVF, IV vanco/aztreonam in ED  On my exam in HD suite, awake, chronically ill appearing, NAD, daughter at bedside. Pt seen by Dr dugan in ED.  CXR--clear    ONCOLOGY CONSULT HPI:   Overall this is an absolutely pleasant 84-year-old gentleman with extensive past medical history significant for end-stage renal disease presently on hemodialysis hypertension rheumatoid arthritis on methotrexate and corticosteroids right lower extremity amputation C. difficile colitis and megacolon severe peripheral arterial disease status post endarterectomy complicated by postoperative infection as well as recent David discovered right lower lobe lung mass suggestive of possible malignancy.  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  Overall the patient denies any recent coughing weight loss appetite changes or such.  The patient has an extensive history of smoking whereby he smoked for approximately 40 years anywhere from 1 to 2 packs a day.    Allergies    Zosyn (Rash)    Intolerances        MEDICATIONS  (STANDING):  allopurinol 100 milliGRAM(s) Oral <User Schedule>  artificial  tears Solution 1 Drop(s) Both EYES three times a day  atorvastatin 20 milliGRAM(s) Oral at bedtime  cholestyramine Powder (Sugar-Free) 4 Gram(s) Oral daily  diltiazem    Tablet 60 milliGRAM(s) Oral four times a day  doxercalciferol Injectable 1 MICROGram(s) IV Push <User Schedule>  epoetin nelly Injectable 74180 Unit(s) IV Push <User Schedule>  finasteride 5 milliGRAM(s) Oral daily  gabapentin 300 milliGRAM(s) Oral daily  heparin  Injectable 5000 Unit(s) SubCutaneous every 8 hours  lidocaine/prilocaine Cream 1 Application(s) Topical daily  pantoprazole    Tablet 40 milliGRAM(s) Oral before breakfast  predniSONE   Tablet 5 milliGRAM(s) Oral every other day  predniSONE   Tablet 2.5 milliGRAM(s) Oral every other day  silver sulfADIAZINE 1% Cream 1 Application(s) Topical daily    MEDICATIONS  (PRN):  acetaminophen   Tablet .. 650 milliGRAM(s) Oral every 6 hours PRN Mild Pain (1 - 3)  ALBUTerol    90 MICROgram(s) HFA Inhaler 2 Puff(s) Inhalation every 6 hours PRN Shortness of Breath and/or Wheezing  guaiFENesin   Syrup  (Sugar-Free) 100 milliGRAM(s) Oral every 6 hours PRN Cough  oxyCODONE    5 mG/acetaminophen 325 mG 1 Tablet(s) Oral every 4 hours PRN Moderate Pain (4 - 6)      PAST MEDICAL & SURGICAL HISTORY:  Above knee amputation of right lower extremity  Recurrent Clostridium difficile diarrhea  Diastolic CHF  Peripheral vascular disease  Afib  Anemia  CKD (chronic kidney disease)  COPD (chronic obstructive pulmonary disease)  SHAYY (obstructive sleep apnea)  Sepsis, due to unspecified organism: 2/2 poorly healing wounds b/l  Dyspepsia: On moderate exertion.  Sleep apnea, obstructive: Requires home 02 therapy, and treatment with BIPAP  Atelectasis  Pleural effusion, bilateral  Respiratory failure  Peripheral edema  CRI (chronic renal insufficiency)  Gout  Benign prostatic hypertrophy  Spinal stenosis  Hypercholesterolemia  GERD (gastroesophageal reflux disease)  CAD (coronary artery disease)  Hypertension  S/P angioplasty with stent  Cataract of left eye  Prostate: Surgery green light procedure.  S/P rotator cuff surgery: Right  S/P angioplasty      FAMILY HISTORY:  Family history of colorectal cancer (Father)  Family history of diabetes mellitus (Mother, Sibling)  Family history of hypertension (Mother)      SOCIAL HISTORY: No EtOH, no tobacco      Todays's Evaluation:    Patient comfortable lying in bed no acute distress: Left upper extremity PICC line right upper extremity dialysis site.  Right lower extremity amputation status post  Left lower extremity mild edema appreciated on exam.  Abdomen soft nontender decreased bowel sounds  Heart S1-S2 no murmurs appreciated on exam skin exam notable for significant ecchymosis.    Laboratories:                           9.5    12.14 )-----------( 188      ( 11 May 2019 07:11 )             31.8       05-11    139  |  107  |  33<H>  ----------------------------<  77  3.9   |  27  |  2.75<H>    Ca    7.7<L>      11 May 2019 07:11  Phos  2.2     05-10            Summary:    Plan:

## 2019-05-11 NOTE — PROGRESS NOTE ADULT - ASSESSMENT
85 y/o male with ESRD on HD (MWF), HTN, RA on MTX and steroids, R leg amputation, severe CDI and megacolon, Severe PAD s/p LLE endarterectomy (4/1/19) with dr Clement presented with fever (100.5) and DC from L groin. Pt was noted to have lung mass increased in size.       #Malignant Lung mass - favors squamous  - s/p bronchoscopy 5/6  - Repeat imaging in 2-3 months  - CT scan noted R endobronchial mass enlarging from previous CT and new L lung mass  - Pulm, CT surgery consult appreciated  - Heme/onc, rad/onc evaluation pending- consults placed    # Supratherapeutic INR  - Hold Coumadin tonight  - f/u a.m. INR    #Surgical site infection s/p endarterectomy 4/1   - Continue daily dressing as recommended by plastic surgery and wound care team.   - Continue pain control with Tylenol PRN, tramadol PRN and percocet PRN.   - ID, Vascular and Plastic surgery consult appreciated    #Thyroid Nodule suspicion for malignancy  - Nodule noted on US  - Repeat US in 6 months outpatient. 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 Clement  - KUSH venous doppler - no evidence of DVT; markedly increased velocity in AV fistula of arm raising suspicion of hemodynamically significant stenosis  - Vascular surgery recs appreciated.     #Recurrent C. diff diarrhea with megacolon  - Monitor off abx  - ID recs appreciated    #PAF    - CHADS2 score - less than 5   - Continue coumadin  -Trend INR    #ESRD on HD M,W,F  -continue dialysis  -Nephro recs appreciated.     #Chronic diastolic CHF -compensated  - Last echo 2018 EF of 60-65%.     #PVD  - s/p R BKA  - Continue statin    #Prophylactic Measure  - On coumadin

## 2019-05-11 NOTE — PROGRESS NOTE ADULT - SUBJECTIVE AND OBJECTIVE BOX
Hospital Course: 85 y/o male with ESRD on hemodialysis (MWF), hypertension, rheumatoid arthritis, on methotrexate and steroids, right leg amputation, severe CDI and megacolon, Severe peripheral artery disease who had a left lower extremity endarterectomy (4/1/19)with dr Clement who presents with a temperature of 100.5 and discharge from the left groin. The patient had a duplex US that showed a hematoma of the left groin. The patient also complains of right arm pain and swelling where the patient had a fistulogram of the AC fistula on last admission. The patient was started on vancomycin given his history recurrent cdiff infection as well as aztreonam for the wound. The aztreonam was discontinued and the patient was started on daptomycin and cefepime for VRE bacteria from the wound site and possible pna found on chest xray. During the hospital course the patient was pancytopenic so the methotrexate was held and it resolved. Vascular surgery did debridement of the wound site as well as a muscle flap. While in the hospital the pt was coughing up blood which lead to a CT of the chest. This revealed an enlarging right middle lobe opacity, a new FERNANDO nodule, and a thyroid nodule. The US of the thyroid recommend FNA outpatient. A PET scan was also recommended for the outpatient. The patient had a flexible bronchoscopy on 5/6.   Path report yielded nonsmall cell carcinoma, favoring squamous cell. Heme/onc and rad onc have been consulted.     SUBJECTIVE: Pt seen and examined at bedside. States bottom feels sore and LLE with pain as well. Tolerating PO w/o N/V. Denies fever and chills.  RN at bedside as well. State Mr. Yost is due for his pain medication.     REVIEW OF SYSTEMS:  CONSTITUTIONAL: No fevers or chills  RESPIRATORY: No shortness of breath  CARDIOVASCULAR: No chest pain or palpitations  GASTROINTESTINAL: No abdominal pain. No nausea, vomiting, or hematemesis  GENITOURINARY: No dysuria  SKIN: LLE wound, groin surgical wound, buttock wound   All other review of systems is negative unless indicated above    Vital Signs Last 24 Hrs  T(C): 36.6 (11 May 2019 12:50), Max: 37.3 (10 May 2019 16:27)  T(F): 97.9 (11 May 2019 12:50), Max: 99.2 (10 May 2019 16:27)  HR: 64 (11 May 2019 12:50) (63 - 80)  BP: 138/32 (11 May 2019 12:50) (99/29 - 168/90)  RR: 18 (11 May 2019 12:50) (16 - 18)  SpO2: 96% (11 May 2019 12:50) (96% - 100%)    I&O's Summary    10 May 2019 07:01  -  11 May 2019 07:00  --------------------------------------------------------  IN: 50 mL / OUT: 0 mL / NET: 50 mL    PHYSICAL EXAM:  Constitutional: NAD, awake and alert, well-developed  Respiratory: Breath sounds are clear; decreased air movement in RL base  Cardiovascular: S1 and S2, regular rate and irregularly irregular rhythm  Gastrointestinal: Bowel Sounds present, soft, nontender, nondistended, no guarding, no rebound  Extremities: No peripheral edema. R BKA, Left surgical site C/D/I. Left ankle wound not draining. IAD noted at buttocks.   Vascular: 2+ peripheral pulses  Neurological: A/O x 3  Skin: No rashes    MEDICATIONS:  MEDICATIONS  (STANDING):  allopurinol 100 milliGRAM(s) Oral <User Schedule>  artificial  tears Solution 1 Drop(s) Both EYES three times a day  atorvastatin 20 milliGRAM(s) Oral at bedtime  cholestyramine Powder (Sugar-Free) 4 Gram(s) Oral daily  diltiazem    Tablet 60 milliGRAM(s) Oral four times a day  doxercalciferol Injectable 1 MICROGram(s) IV Push <User Schedule>  epoetin nelly Injectable 08630 Unit(s) IV Push <User Schedule>  finasteride 5 milliGRAM(s) Oral daily  gabapentin 300 milliGRAM(s) Oral daily  heparin  Injectable 5000 Unit(s) SubCutaneous every 8 hours  lidocaine/prilocaine Cream 1 Application(s) Topical daily  pantoprazole    Tablet 40 milliGRAM(s) Oral before breakfast  predniSONE   Tablet 5 milliGRAM(s) Oral every other day  predniSONE   Tablet 2.5 milliGRAM(s) Oral every other day  silver sulfADIAZINE 1% Cream 1 Application(s) Topical daily      LABS: All Labs Reviewed:                        9.5    12.14 )-----------( 188      ( 11 May 2019 07:11 )             31.8     05-11    139  |  107  |  33<H>  ----------------------------<  77  3.9   |  27  |  2.75<H>    Ca    7.7<L>      11 May 2019 07:11  Phos  2.2     05-10      PT/INR - ( 11 May 2019 07:11 )   PT: 34.1 sec;   INR: 2.97 ratio         Blood Culture: 05-06 @ 19:49  Organism --  Gram Stain Blood -- Gram Stain   No polymorphonuclear cells seen per low power field  No squamous epithelial cells per low power field  No organisms seen per oil power field  Specimen Source Bronch Wash right lung middle lobe washings for culture  Culture-Blood --    RADIOLOGY/EKG:

## 2019-05-11 NOTE — PROGRESS NOTE ADULT - ASSESSMENT
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  Overall at this time given the patient's overall performance status as well as concurrent comorbidities would recommend a having the patient complete systemic imaging.  At this point the patient is already undergone a CT of the chest to evaluate thoracic disease yet may benefit from also having a PET/CT as an outpatient versus having an additional CT of the abdomen pelvis to rule out for any additional occult metastatic sites.  In light of the patient's suspected local site of disease may be prudent to have radiation oncology come and evaluate the patient for his candidacy for possible CyberKnife to the local site of disease.  During this evening's discussion I explained to the patient that until we have further information regarding overall extent of disease at this point my recommendation would be for local joint disease and with regards to his candidacy for surgical intervention as well as systemic chemotherapy my clinical assessment is that the patient's overall performance status is very borderline and questionable and the risks of any potential treatment including surgical resection versus systemic chemotherapy which is not indicated if the disease is deemed to be early and localized.  Would recommend continued close follow-up.  Recommendations:  1.  Would recommend radiation oncology evaluation  2.  Would also consider additional systemic imaging including CT of the abdomen.  Versus having the patient undergo a PET/CT as an outpatient.

## 2019-05-12 LAB
ANION GAP SERPL CALC-SCNC: 8 MMOL/L — SIGNIFICANT CHANGE UP (ref 5–17)
BUN SERPL-MCNC: 51 MG/DL — HIGH (ref 7–23)
CALCIUM SERPL-MCNC: 7.9 MG/DL — LOW (ref 8.5–10.1)
CHLORIDE SERPL-SCNC: 108 MMOL/L — SIGNIFICANT CHANGE UP (ref 96–108)
CO2 SERPL-SCNC: 23 MMOL/L — SIGNIFICANT CHANGE UP (ref 22–31)
CREAT SERPL-MCNC: 3.74 MG/DL — HIGH (ref 0.5–1.3)
GLUCOSE SERPL-MCNC: 73 MG/DL — SIGNIFICANT CHANGE UP (ref 70–99)
HCT VFR BLD CALC: 33.3 % — LOW (ref 39–50)
HGB BLD-MCNC: 9.9 G/DL — LOW (ref 13–17)
INR BLD: 4.32 RATIO — HIGH (ref 0.88–1.16)
MCHC RBC-ENTMCNC: 29.7 GM/DL — LOW (ref 32–36)
MCHC RBC-ENTMCNC: 31.5 PG — SIGNIFICANT CHANGE UP (ref 27–34)
MCV RBC AUTO: 106.1 FL — HIGH (ref 80–100)
NRBC # BLD: 0 /100 WBCS — SIGNIFICANT CHANGE UP (ref 0–0)
PLATELET # BLD AUTO: 213 K/UL — SIGNIFICANT CHANGE UP (ref 150–400)
POTASSIUM SERPL-MCNC: 5.1 MMOL/L — SIGNIFICANT CHANGE UP (ref 3.5–5.3)
POTASSIUM SERPL-SCNC: 5.1 MMOL/L — SIGNIFICANT CHANGE UP (ref 3.5–5.3)
PROTHROM AB SERPL-ACNC: 50.2 SEC — HIGH (ref 10–12.9)
RBC # BLD: 3.14 M/UL — LOW (ref 4.2–5.8)
RBC # FLD: 22.2 % — HIGH (ref 10.3–14.5)
SODIUM SERPL-SCNC: 139 MMOL/L — SIGNIFICANT CHANGE UP (ref 135–145)
WBC # BLD: 13.16 K/UL — HIGH (ref 3.8–10.5)
WBC # FLD AUTO: 13.16 K/UL — HIGH (ref 3.8–10.5)

## 2019-05-12 RX ADMIN — Medication 100 MILLIGRAM(S): at 11:37

## 2019-05-12 RX ADMIN — FINASTERIDE 5 MILLIGRAM(S): 5 TABLET, FILM COATED ORAL at 11:37

## 2019-05-12 RX ADMIN — Medication 1 DROP(S): at 06:23

## 2019-05-12 RX ADMIN — ATORVASTATIN CALCIUM 20 MILLIGRAM(S): 80 TABLET, FILM COATED ORAL at 22:22

## 2019-05-12 RX ADMIN — OXYCODONE AND ACETAMINOPHEN 1 TABLET(S): 5; 325 TABLET ORAL at 11:35

## 2019-05-12 RX ADMIN — HEPARIN SODIUM 5000 UNIT(S): 5000 INJECTION INTRAVENOUS; SUBCUTANEOUS at 06:25

## 2019-05-12 RX ADMIN — CHOLESTYRAMINE 4 GRAM(S): 4 POWDER, FOR SUSPENSION ORAL at 08:41

## 2019-05-12 RX ADMIN — PANTOPRAZOLE SODIUM 40 MILLIGRAM(S): 20 TABLET, DELAYED RELEASE ORAL at 06:23

## 2019-05-12 RX ADMIN — HEPARIN SODIUM 5000 UNIT(S): 5000 INJECTION INTRAVENOUS; SUBCUTANEOUS at 13:28

## 2019-05-12 RX ADMIN — OXYCODONE AND ACETAMINOPHEN 1 TABLET(S): 5; 325 TABLET ORAL at 18:01

## 2019-05-12 RX ADMIN — GABAPENTIN 300 MILLIGRAM(S): 400 CAPSULE ORAL at 11:35

## 2019-05-12 RX ADMIN — Medication 1 DROP(S): at 13:40

## 2019-05-12 RX ADMIN — Medication 1 DROP(S): at 22:23

## 2019-05-12 RX ADMIN — Medication 1 APPLICATION(S): at 11:38

## 2019-05-12 RX ADMIN — OXYCODONE AND ACETAMINOPHEN 1 TABLET(S): 5; 325 TABLET ORAL at 12:45

## 2019-05-12 RX ADMIN — OXYCODONE AND ACETAMINOPHEN 1 TABLET(S): 5; 325 TABLET ORAL at 06:41

## 2019-05-12 RX ADMIN — HEPARIN SODIUM 5000 UNIT(S): 5000 INJECTION INTRAVENOUS; SUBCUTANEOUS at 22:23

## 2019-05-12 NOTE — PROGRESS NOTE ADULT - SUBJECTIVE AND OBJECTIVE BOX
NEPHROLOGY INTERVAL HPI/OVERNIGHT EVENTS:    5/12  feels well  Dr Mcneill input noted   for outpt staging of bronchial lesion    05-10-19 @ 10:13  seen at HD  pulmonary and CTS input noted.    no new c/o appetite fair, no sob, cough improved  no diarrhea    TTE (10/18)--mild-mod MR/EF 60%    Pt d/w daughter regarding advance directives--Pt is FULL RESUSCITATION (19 Apr 2019 16:12)    Allergies    Zosyn (Rash)    Intolerances      Vital Signs Last 24 Hrs  T(C): 37 (12 May 2019 17:12), Max: 37 (11 May 2019 21:45)  T(F): 98.6 (12 May 2019 17:12), Max: 98.6 (11 May 2019 21:45)  HR: 71 (12 May 2019 17:12) (70 - 79)  BP: 153/40 (12 May 2019 17:12) (130/45 - 153/40)  BP(mean): --  RR: 18 (12 May 2019 17:12) (17 - 18)  SpO2: 100% (12 May 2019 17:12) (98% - 100%)    PHYSICAL EXAM:  General: alert. awake Ox3  HEENT: MMM  CV: s1s2 rrr  LUNGS: B/L ronchi  EXT: no edema    LABS:             05-12    139  |  108  |  51<H>  ----------------------------<  73  5.1   |  23  |  3.74<H>    Ca    7.9<L>      12 May 2019 06:46                          9.9    13.16 )-----------( 213      ( 12 May 2019 06:46 )             33.3

## 2019-05-12 NOTE — PROGRESS NOTE ADULT - ASSESSMENT
85 y/o male with ESRD on HD (MWF), HTN, RA on MTX and steroids, R leg amputation, severe CDI and megacolon, Severe PAD s/p LLE endarterectomy (4/1/19) with dr Clement presented with fever (100.5) and DC from L groin. Pt was noted to have lung mass increased in size.       #Malignant Lung mass - favors squamous  - F/u CT A/P w/ IV contrast - to be done before HD  - F/u MRI brain to evaluate for extent of malignancy. Please do scan before HD d/t contrast use. Pt has ESRD.  - Will need PET outpatient  - s/p bronchoscopy 5/6  - Repeat imaging in 2-3 months  - CT scan noted R endobronchial mass enlarging from previous CT and new L lung mass  - Pulm, CT surgery, heme/onc, Rad/onc consult appreciated    #Supratherapeutic INR  - No s/s of acute bleeding  - Continue to hold Coumadin  - Daily INR    #Surgical site infection s/p endarterectomy 4/1   - Continue daily dressing as recommended by plastic surgery and wound care team.   - Continue pain control with Tylenol PRN, tramadol PRN and percocet PRN.   - ID, Vascular and Plastic surgery consult appreciated    #Thyroid Nodule suspicion for malignancy  - Nodule noted on US  - Repeat US in 6 months outpatient. 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 diff diarrhea with megacolon  - Monitor off abx  - ID recs appreciated    #PAF    - CHADS2 score - less than 5   - Hold Coumadin in the setting supratherapeutic INR  - Daily INR    #ESRD on HD M,W,F  - continue dialysis  - CT A/P and MRI to be done before HD  - Nephro recs appreciated.     #Chronic diastolic CHF -compensated  - Last echo 2018 EF of 60-65%.     #PVD  - s/p R AKA  - Continue statin    #Prophylactic Measure  - Hold coumadin in the setting of supratherapeutic INR 83 y/o male with ESRD on HD (MWF), HTN, RA on MTX and steroids, R leg amputation, severe CDI and megacolon, Severe PAD s/p LLE endarterectomy (4/1/19) with dr Clement presented with fever (100.5) and DC from L groin. Pt was noted to have lung mass increased in size.       #Malignant Lung mass - favors squamous  - F/u CT A/P w/ IV contrast - to be done before HD  - F/u MRI brain to evaluate for extent of malignancy. Please do scan before HD d/t contrast use. Pt has ESRD.  - Will need PET outpatient  - s/p bronchoscopy 5/6  - Repeat imaging in 2-3 months  - CT scan noted R endobronchial mass enlarging from previous CT and new L lung mass  - Pulm, CT surgery, heme/onc, Rad/onc consult appreciated    #Supratherapeutic INR  - No s/s of acute bleeding  - Continue to hold Coumadin  - Daily INR    #Surgical site infection s/p endarterectomy 4/1   - Continue daily dressing as recommended by plastic surgery and wound care team.   - Continue pain control with Tylenol PRN, tramadol PRN and percocet PRN.   - ID, Vascular and Plastic surgery consult appreciated    #Thyroid Nodule suspicion for malignancy  - Nodule noted on US  - Repeat US in 6 months outpatient. 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 diff diarrhea with megacolon  - Monitor off abx  - ID recs appreciated    #PAF    - CHADS2 score of 3 (Age, CHF, HTN)  - Hold Coumadin in the setting supratherapeutic INR  - Daily INR    #ESRD on HD M,W,F  - continue dialysis  - CT A/P and MRI to be done before HD  - Nephro recs appreciated.     #Chronic diastolic CHF -compensated  - Last echo 2018 EF of 60-65%.     #PVD  - s/p R AKA  - Asa held    #Prophylactic Measure  - Hold coumadin in the setting of supratherapeutic INR

## 2019-05-12 NOTE — CONSULT NOTE ADULT - CONSULT REASON
85 y/o gentleman with extensive smoking history and  with multiple medical problems now with newly diagnosed non-small cell carcinoma of lung per positive bronchoscopy obtained brushing. For evaluation and consideration of radiation therapy.

## 2019-05-12 NOTE — CONSULT NOTE ADULT - REASON FOR ADMISSION
NICHOLE gleason DC
left groin incision with drainage, fever in need of IV antibiotics
NICHOLE gleason DC

## 2019-05-12 NOTE — CONSULT NOTE ADULT - SUBJECTIVE AND OBJECTIVE BOX
HPI:  83 y/o male with ESRD on HD (MWF), HTN, RA on MTX and steroids, R leg amputation, severe CDI and megacolon, Severe PAD s/p LLE endarterectomy 2 weeks ago with dr clement presents with T 100 and DC from L groin.  Pt also c/o R arm pain/swelling where pt had fistulogram of the AC fistula on last admission.  RUL dopplers negative for DVT.  L groin sono--negative for pseudo aneurysm and + hematoma.  Pt given 2.1 L IVF, IV vanco/aztreonam in ED  On initial  exam in HD suite, awake, chronically ill appearing, NAD, daughter at bedside. Pt seen by Dr Clement in ED.  CXR--clear    Allergies    Zosyn (Rash)    Intolerances        MEDICATIONS  (STANDING):  allopurinol 100 milliGRAM(s) Oral <User Schedule>  artificial  tears Solution 1 Drop(s) Both EYES three times a day  atorvastatin 20 milliGRAM(s) Oral at bedtime  cholestyramine Powder (Sugar-Free) 4 Gram(s) Oral daily  diltiazem    Tablet 60 milliGRAM(s) Oral four times a day  doxercalciferol Injectable 1 MICROGram(s) IV Push <User Schedule>  epoetin nelly Injectable 52198 Unit(s) IV Push <User Schedule>  finasteride 5 milliGRAM(s) Oral daily  gabapentin 300 milliGRAM(s) Oral daily  heparin  Injectable 5000 Unit(s) SubCutaneous every 8 hours  lidocaine/prilocaine Cream 1 Application(s) Topical daily  pantoprazole    Tablet 40 milliGRAM(s) Oral before breakfast  predniSONE   Tablet 5 milliGRAM(s) Oral every other day  predniSONE   Tablet 2.5 milliGRAM(s) Oral every other day  silver sulfADIAZINE 1% Cream 1 Application(s) Topical daily    MEDICATIONS  (PRN):  acetaminophen   Tablet .. 650 milliGRAM(s) Oral every 6 hours PRN Mild Pain (1 - 3)  ALBUTerol    90 MICROgram(s) HFA Inhaler 2 Puff(s) Inhalation every 6 hours PRN Shortness of Breath and/or Wheezing  guaiFENesin   Syrup  (Sugar-Free) 100 milliGRAM(s) Oral every 6 hours PRN Cough  oxyCODONE    5 mG/acetaminophen 325 mG 1 Tablet(s) Oral every 4 hours PRN Moderate Pain (4 - 6)      PAST MEDICAL & SURGICAL HISTORY:  Above knee amputation of right lower extremity  Recurrent Clostridium difficile diarrhea  Diastolic CHF  Peripheral vascular disease  Afib  Anemia  CKD (chronic kidney disease)  COPD (chronic obstructive pulmonary disease)  SHAYY (obstructive sleep apnea)  Sepsis, due to unspecified organism: 2/2 poorly healing wounds b/l  Dyspepsia: On moderate exertion.  Sleep apnea, obstructive: Requires home 02 therapy, and treatment with BIPAP  Atelectasis  Pleural effusion, bilateral  Respiratory failure  Peripheral edema  CRI (chronic renal insufficiency)  Gout  Benign prostatic hypertrophy  Spinal stenosis  Hypercholesterolemia  GERD (gastroesophageal reflux disease)  CAD (coronary artery disease)  Hypertension  S/P angioplasty with stent  Cataract of left eye  Prostate: Surgery green light procedure.  S/P rotator cuff surgery: Right  S/P angioplasty      FAMILY HISTORY:  Family history of colorectal cancer (Father)  Family history of diabetes mellitus (Mother, Sibling)  Family history of hypertension (Mother)      SOCIAL HISTORY: No EtOH, no tobacco currently but smoked 1-2 packs of cigarretes daily in past.    PHYSICAL EXAM          Laboratories:                           9.5    12.14 )-----------( 188      ( 11 May 2019 07:11 )             31.8       05-11    139  |  107  |  33<H>  ----------------------------<  77  3.9   |  27  |  2.75<H>    Ca    7.7<L>      11 May 2019 07:11  Phos  2.2     05-10    RADIOGRAPHIC STUDIES    EXAM: CT CHEST       PROCEDURE DATE: 04/28/2019         INTERPRETATION: CLINICAL INFORMATION: Hemoptysis, possible neoplasm.     COMPARISON: December 21, 2015. Correlation is made to CT abdomen and pelvis   from October 5, 2018.     PROCEDURE:   CT of the Chest was performed without intravenous contrast.   Sagittal and coronal reformats were performed.     FINDINGS:     LUNGS AND LARGE AIRWAYS: Emphysema. Continued increase size of tubular   oblong opacity in the right middle lobe, possibly endobronchial mass with   impacted airways. Possible associated broncholiths. Irregular opacity in the   posterior left upper lobe (series 3, image 60), measuring 1.4 cm.   PLEURA: Small left pleural effusion. Trace right pleural effusion.   VESSELS: Atherosclerotic calcifications.   HEART: Heart size is normal. No pericardial effusion.   MEDIASTINUM AND [place]: No lymphadenopathy.   CHEST WALL AND LOWER NECK: Left thyroid nodule, measuring 2.9 cm. Extension   of the thyroid gland into the superior mediastinum.   VISUALIZED UPPER ABDOMEN: Cholelithiasis. Unchanged probable cyst in hepatic   segment 8.   BONES: Degenerative changes of the spine.     IMPRESSION:   Continued increase size of tubular oblong opacity in the right middle lobe,   possibly endobronchial mass with impacted airways. Irregular opacity in the   posterior left upper lobe, measuring 1.4 cm. PET/CT is recommended for   further evaluation.     Left thyroid nodule, measuring 2.9 cm. Further evaluation with thyroid   ultrasound is recommended.       TRUDY KLEIN M.D., ATTENDING RADIOLOGIST   This document has been electronically signed. Apr 28 2019 2:45PM HPI:  85 y/o male with ESRD on HD (MWF), HTN, RA on MTX and steroids, R leg amputation, severe CDI and megacolon, Severe PAD s/p LLE endarterectomy 2 weeks ago with dr clement presents with T 100 and DC from L groin.  Pt also c/o R arm pain/swelling where pt had fistulogram of the AC fistula on last admission.  RUL dopplers negative for DVT.  L groin sono--negative for pseudo aneurysm and + hematoma.  Pt given 2.1 L IVF, IV vanco/aztreonam in ED  On initial  exam in HD suite, awake, chronically ill appearing, NAD, daughter at bedside. Pt seen by Dr Clement in ED.  CXR--clear    Patient note on imaging to have a Right lower lobe mass suspicious for malignancy. 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  Patient denies chest pain , recent increase in shortness of breath. Occasional cough at times productive of non-blooding whitish-yellowish mucous.    Allergies    Zosyn (Rash)      MEDICATIONS  (STANDING):  allopurinol 100 milliGRAM(s) Oral <User Schedule>  artificial  tears Solution 1 Drop(s) Both EYES three times a day  atorvastatin 20 milliGRAM(s) Oral at bedtime  cholestyramine Powder (Sugar-Free) 4 Gram(s) Oral daily  diltiazem    Tablet 60 milliGRAM(s) Oral four times a day  doxercalciferol Injectable 1 MICROGram(s) IV Push <User Schedule>  epoetin nelly Injectable 05706 Unit(s) IV Push <User Schedule>  finasteride 5 milliGRAM(s) Oral daily  gabapentin 300 milliGRAM(s) Oral daily  heparin  Injectable 5000 Unit(s) SubCutaneous every 8 hours  lidocaine/prilocaine Cream 1 Application(s) Topical daily  pantoprazole    Tablet 40 milliGRAM(s) Oral before breakfast  predniSONE   Tablet 5 milliGRAM(s) Oral every other day  predniSONE   Tablet 2.5 milliGRAM(s) Oral every other day  silver sulfADIAZINE 1% Cream 1 Application(s) Topical daily    MEDICATIONS  (PRN):  acetaminophen   Tablet .. 650 milliGRAM(s) Oral every 6 hours PRN Mild Pain (1 - 3)  ALBUTerol    90 MICROgram(s) HFA Inhaler 2 Puff(s) Inhalation every 6 hours PRN Shortness of Breath and/or Wheezing  guaiFENesin   Syrup  (Sugar-Free) 100 milliGRAM(s) Oral every 6 hours PRN Cough  oxyCODONE    5 mG/acetaminophen 325 mG 1 Tablet(s) Oral every 4 hours PRN Moderate Pain (4 - 6)      PAST MEDICAL & SURGICAL HISTORY:  Above knee amputation of right lower extremity  Recurrent Clostridium difficile diarrhea  Diastolic CHF  Peripheral vascular disease  Afib  Anemia  CKD (chronic kidney disease)  COPD (chronic obstructive pulmonary disease)  SHAYY (obstructive sleep apnea)  Sepsis, due to unspecified organism: 2/2 poorly healing wounds b/l  Dyspepsia: On moderate exertion.  Sleep apnea, obstructive: Requires home 02 therapy, and treatment with BIPAP  Atelectasis  Pleural effusion, bilateral  Respiratory failure  Peripheral edema  CRI (chronic renal insufficiency)  Gout  Benign prostatic hypertrophy  Spinal stenosis  Hypercholesterolemia  GERD (gastroesophageal reflux disease)  CAD (coronary artery disease)  Hypertension  S/P angioplasty with stent  Cataract of left eye  Prostate: Surgery green light procedure.  S/P rotator cuff surgery: Right  S/P angioplasty      FAMILY HISTORY:  Family history of colorectal cancer (Father)  Family history of diabetes mellitus (Mother, Sibling)  Family history of hypertension (Mother)      SOCIAL HISTORY: No EtOH, no tobacco currently but smoked 1-2 packs of cigarretes daily in past.    PHYSICAL EXAM          Laboratories:                           9.5    12.14 )-----------( 188      ( 11 May 2019 07:11 )             31.8       05-11    139  |  107  |  33<H>  ----------------------------<  77  3.9   |  27  |  2.75<H>    Ca    7.7<L>      11 May 2019 07:11  Phos  2.2     05-10    RADIOGRAPHIC STUDIES    EXAM: CT CHEST       PROCEDURE DATE: 04/28/2019         INTERPRETATION: CLINICAL INFORMATION: Hemoptysis, possible neoplasm.     COMPARISON: December 21, 2015. Correlation is made to CT abdomen and pelvis   from October 5, 2018.     PROCEDURE:   CT of the Chest was performed without intravenous contrast.   Sagittal and coronal reformats were performed.     FINDINGS:     LUNGS AND LARGE AIRWAYS: Emphysema. Continued increase size of tubular   oblong opacity in the right middle lobe, possibly endobronchial mass with   impacted airways. Possible associated broncholiths. Irregular opacity in the   posterior left upper lobe (series 3, image 60), measuring 1.4 cm.   PLEURA: Small left pleural effusion. Trace right pleural effusion.   VESSELS: Atherosclerotic calcifications.   HEART: Heart size is normal. No pericardial effusion.   MEDIASTINUM AND [place]: No lymphadenopathy.   CHEST WALL AND LOWER NECK: Left thyroid nodule, measuring 2.9 cm. Extension   of the thyroid gland into the superior mediastinum.   VISUALIZED UPPER ABDOMEN: Cholelithiasis. Unchanged probable cyst in hepatic   segment 8.   BONES: Degenerative changes of the spine.     IMPRESSION:   Continued increase size of tubular oblong opacity in the right middle lobe,   possibly endobronchial mass with impacted airways. Irregular opacity in the   posterior left upper lobe, measuring 1.4 cm. PET/CT is recommended for   further evaluation.     Left thyroid nodule, measuring 2.9 cm. Further evaluation with thyroid   ultrasound is recommended.       TRUDY KLEIN M.D., ATTENDING RADIOLOGIST   This document has been electronically signed. Apr 28 2019 2:45PM HPI:  83 y/o male with ESRD on HD (MWF), HTN, RA on MTX and steroids, R leg amputation, severe CDI and megacolon, Severe PAD s/p LLE endarterectomy 2 weeks ago with dr clement presents with T 100 and DC from L groin.  Pt also c/o R arm pain/swelling where pt had fistulogram of the AC fistula on last admission.  RUL dopplers negative for DVT.  L groin sono--negative for pseudo aneurysm and + hematoma.  Pt given 2.1 L IVF, IV vanco/aztreonam in ED  On initial  exam in HD suite, awake, chronically ill appearing, NAD, daughter at bedside. Pt seen by Dr Clement in ED.  CXR--clear    Patient note on imaging to have a Right lower lobe mass suspicious for malignancy. 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  Patient denies chest pain , recent increase in shortness of breath. Occasional cough at times productive of non-blooding whitish-yellowish mucous.    Allergies    Zosyn (Rash)      MEDICATIONS  (STANDING):  allopurinol 100 milliGRAM(s) Oral <User Schedule>  artificial  tears Solution 1 Drop(s) Both EYES three times a day  atorvastatin 20 milliGRAM(s) Oral at bedtime  cholestyramine Powder (Sugar-Free) 4 Gram(s) Oral daily  diltiazem    Tablet 60 milliGRAM(s) Oral four times a day  doxercalciferol Injectable 1 MICROGram(s) IV Push <User Schedule>  epoetin nelly Injectable 99136 Unit(s) IV Push <User Schedule>  finasteride 5 milliGRAM(s) Oral daily  gabapentin 300 milliGRAM(s) Oral daily  heparin  Injectable 5000 Unit(s) SubCutaneous every 8 hours  lidocaine/prilocaine Cream 1 Application(s) Topical daily  pantoprazole    Tablet 40 milliGRAM(s) Oral before breakfast  predniSONE   Tablet 5 milliGRAM(s) Oral every other day  predniSONE   Tablet 2.5 milliGRAM(s) Oral every other day  silver sulfADIAZINE 1% Cream 1 Application(s) Topical daily    MEDICATIONS  (PRN):  acetaminophen   Tablet .. 650 milliGRAM(s) Oral every 6 hours PRN Mild Pain (1 - 3)  ALBUTerol    90 MICROgram(s) HFA Inhaler 2 Puff(s) Inhalation every 6 hours PRN Shortness of Breath and/or Wheezing  guaiFENesin   Syrup  (Sugar-Free) 100 milliGRAM(s) Oral every 6 hours PRN Cough  oxyCODONE    5 mG/acetaminophen 325 mG 1 Tablet(s) Oral every 4 hours PRN Moderate Pain (4 - 6)      PAST MEDICAL & SURGICAL HISTORY:  Above knee amputation of right lower extremity  Recurrent Clostridium difficile diarrhea  Diastolic CHF  Peripheral vascular disease  Afib  Anemia  CKD (chronic kidney disease)  COPD (chronic obstructive pulmonary disease)  SHAYY (obstructive sleep apnea)  Sepsis, due to unspecified organism: 2/2 poorly healing wounds b/l  Dyspepsia: On moderate exertion.  Sleep apnea, obstructive: Requires home 02 therapy, and treatment with BIPAP  Atelectasis  Pleural effusion, bilateral  Respiratory failure  Peripheral edema  CRI (chronic renal insufficiency)  Gout  Benign prostatic hypertrophy  Spinal stenosis  Hypercholesterolemia  GERD (gastroesophageal reflux disease)  CAD (coronary artery disease)  Hypertension  S/P angioplasty with stent  Cataract of left eye  Prostate: Surgery green light procedure.  S/P rotator cuff surgery: Right  S/P angioplasty      FAMILY HISTORY:  Family history of colorectal cancer (Father)  Family history of diabetes mellitus (Mother, Sibling)  Family history of hypertension (Mother)      SOCIAL HISTORY: No EtOH, no tobacco currently but smoked 1-2 packs of cigarretes daily in past.    PHYSICAL EXAM    Vital Signs Last 24 Hrs  T(C): 36.3 (12 May 2019 12:09), Max: 37 (11 May 2019 21:45)  T(F): 97.4 (12 May 2019 12:09), Max: 98.6 (11 May 2019 21:45)  HR: 70 (12 May 2019 12:09) (64 - 79)  BP: 130/45 (12 May 2019 12:09) (130/45 - 148/26)  BP(mean): --  RR: 18 (12 May 2019 12:09) (17 - 18)  SpO2: 100% (12 May 2019 12:09) (96% - 100%)    LABS                          9.5    12.14 )-----------( 188      ( 11 May 2019 07:11 )             31.8       05-11    139  |  107  |  33<H>  ----------------------------<  77  3.9   |  27  |  2.75<H>    Ca    7.7<L>      11 May 2019 07:11  Phos  2.2     05-10    RADIOGRAPHIC STUDIES    EXAM: CT CHEST       PROCEDURE DATE: 04/28/2019         INTERPRETATION: CLINICAL INFORMATION: Hemoptysis, possible neoplasm.     COMPARISON: December 21, 2015. Correlation is made to CT abdomen and pelvis   from October 5, 2018.     PROCEDURE:   CT of the Chest was performed without intravenous contrast.   Sagittal and coronal reformats were performed.     FINDINGS:     LUNGS AND LARGE AIRWAYS: Emphysema. Continued increase size of tubular   oblong opacity in the right middle lobe, possibly endobronchial mass with   impacted airways. Possible associated broncholiths. Irregular opacity in the   posterior left upper lobe (series 3, image 60), measuring 1.4 cm.   PLEURA: Small left pleural effusion. Trace right pleural effusion.   VESSELS: Atherosclerotic calcifications.   HEART: Heart size is normal. No pericardial effusion.   MEDIASTINUM AND [place]: No lymphadenopathy.   CHEST WALL AND LOWER NECK: Left thyroid nodule, measuring 2.9 cm. Extension   of the thyroid gland into the superior mediastinum.   VISUALIZED UPPER ABDOMEN: Cholelithiasis. Unchanged probable cyst in hepatic   segment 8.   BONES: Degenerative changes of the spine.     IMPRESSION:   Continued increase size of tubular oblong opacity in the right middle lobe,   possibly endobronchial mass with impacted airways. Irregular opacity in the   posterior left upper lobe, measuring 1.4 cm. PET/CT is recommended for   further evaluation.     Left thyroid nodule, measuring 2.9 cm. Further evaluation with thyroid   ultrasound is recommended.       TRUDY KLEIN M.D., ATTENDING RADIOLOGIST   This document has been electronically signed. Apr 28 2019 2:45PM

## 2019-05-12 NOTE — CONSULT NOTE ADULT - CONSTITUTIONAL COMMENTS
appears debilitated and chronically ill but in no acute distress , comfortable engaging in converstation

## 2019-05-12 NOTE — CONSULT NOTE ADULT - EXTREMITIES COMMENTS
s/p right LE AKA  left LE with well healing wound proximal anterolateral thigh  Dressing , clean and dry over distal left leg.

## 2019-05-12 NOTE — PROGRESS NOTE ADULT - SUBJECTIVE AND OBJECTIVE BOX
CHIEF COMPLAINT:    5/12: Pt seen and examined. No overnight events. No acute complaints. Right neck sutures still in place - will remove sutures today. Reports intermittent heel pain. Reports buttock pain is improving. Plan for today discussed with pt. Questions and answers answered.     REVIEW OF SYSTEMS:  CONSTITUTIONAL: No weakness, fevers or chills  EYES/ENT: No visual changes;  No vertigo or throat pain   NECK: No pain or stiffness  RESPIRATORY: No cough, wheezing, hemoptysis; No shortness of breath  CARDIOVASCULAR: No chest pain or palpitations  GASTROINTESTINAL: No abdominal or epigastric pain. No nausea, vomiting, or hematemesis; No diarrhea or constipation. No melena or hematochezia.  GENITOURINARY: No dysuria, frequency or hematuria  NEUROLOGICAL: No numbness or weakness  SKIN: No itching, burning, rashes, or lesions   All other review of systems is negative unless indicated above    Vital Signs Last 24 Hrs  T(C): 36.3 (12 May 2019 12:09), Max: 37 (11 May 2019 21:45)  T(F): 97.4 (12 May 2019 12:09), Max: 98.6 (11 May 2019 21:45)  HR: 70 (12 May 2019 12:09) (64 - 79)  BP: 130/45 (12 May 2019 12:09) (130/45 - 148/26)  BP(mean): --  RR: 18 (12 May 2019 12:09) (17 - 18)  SpO2: 100% (12 May 2019 12:09) (96% - 100%)    I&O's Summary    12 May 2019 07:01  -  12 May 2019 12:13  --------------------------------------------------------  IN: 360 mL / OUT: 0 mL / NET: 360 mL        CAPILLARY BLOOD GLUCOSE          PHYSICAL EXAM:  Constitutional: NAD, awake and alert, well-developed  HEENT: PERR, EOMI, Normal Hearing, MMM  Neck: Soft and supple, No LAD, No JVD  Respiratory: Breath sounds are clear bilaterally, No wheezing, rales or rhonchi  Cardiovascular: S1 and S2, regular rate and rhythm, no Murmurs, gallops or rubs  Gastrointestinal: Bowel Sounds present, soft, nontender, nondistended, no guarding, no rebound  Extremities: No peripheral edema  Vascular: 2+ peripheral pulses  Neurological: A/O x 3, no focal deficits  Musculoskeletal: 5/5 strength b/l upper and lower extremities  Skin: No rashes    MEDICATIONS:  MEDICATIONS  (STANDING):  allopurinol 100 milliGRAM(s) Oral <User Schedule>  artificial  tears Solution 1 Drop(s) Both EYES three times a day  atorvastatin 20 milliGRAM(s) Oral at bedtime  cholestyramine Powder (Sugar-Free) 4 Gram(s) Oral daily  diltiazem    Tablet 60 milliGRAM(s) Oral four times a day  doxercalciferol Injectable 1 MICROGram(s) IV Push <User Schedule>  epoetin nelly Injectable 41830 Unit(s) IV Push <User Schedule>  finasteride 5 milliGRAM(s) Oral daily  gabapentin 300 milliGRAM(s) Oral daily  heparin  Injectable 5000 Unit(s) SubCutaneous every 8 hours  lidocaine/prilocaine Cream 1 Application(s) Topical daily  pantoprazole    Tablet 40 milliGRAM(s) Oral before breakfast  predniSONE   Tablet 5 milliGRAM(s) Oral every other day  predniSONE   Tablet 2.5 milliGRAM(s) Oral every other day  silver sulfADIAZINE 1% Cream 1 Application(s) Topical daily      LABS: All Labs Reviewed:                        9.9    13.16 )-----------( 213      ( 12 May 2019 06:46 )             33.3     05-12    139  |  108  |  51<H>  ----------------------------<  73  5.1   |  23  |  3.74<H>    Ca    7.9<L>      12 May 2019 06:46      PT/INR - ( 12 May 2019 06:46 )   PT: 50.2 sec;   INR: 4.32 ratio               Blood Culture:     RADIOLOGY/EKG:    DVT PPX:    ADVANCED DIRECTIVE:    DISPOSITION: Hospital Course: 83 y/o male with ESRD on hemodialysis (MWF), hypertension, rheumatoid arthritis, on methotrexate and steroids, right leg amputation, severe CDI and megacolon, Severe peripheral artery disease who had a left lower extremity endarterectomy (4/1/19)with dr Clement who presents with a temperature of 100.5 and discharge from the left groin. The patient had a duplex US that showed a hematoma of the left groin. The patient also complains of right arm pain and swelling where the patient had a fistulogram of the AC fistula on last admission. The patient was started on vancomycin given his history recurrent cdiff infection as well as aztreonam for the wound. The aztreonam was discontinued and the patient was started on daptomycin and cefepime for VRE bacteria from the wound site and possible pna found on chest xray. During the hospital course the patient was pancytopenic so the methotrexate was held and it resolved. Vascular surgery did debridement of the wound site as well as a muscle flap. While in the hospital the pt was coughing up blood which lead to a CT of the chest. This revealed an enlarging right middle lobe opacity, a new FERNANDO nodule, and a thyroid nodule. The US of the thyroid recommend FNA outpatient. A PET scan was also recommended for the outpatient. The patient had a flexible bronchoscopy on 5/6.   Path report yielded nonsmall cell carcinoma, favoring squamous cell. Heme/onc and rad onc have been consulted.    5/12: Pt seen and examined. No overnight events. No acute complaints. Right neck sutures still in place - will remove sutures today. Reports intermittent heel pain. Reports buttock pain is improving. Plan for today discussed with pt. Questions and answers answered.     REVIEW OF SYSTEMS:  CONSTITUTIONAL: No weakness, fevers or chills  EYES/ENT: No visual changes;  No vertigo or throat pain   NECK: No pain or stiffness  RESPIRATORY: No cough, wheezing, hemoptysis; No shortness of breath  CARDIOVASCULAR: No chest pain or palpitations  GASTROINTESTINAL: No abdominal or epigastric pain. No nausea, vomiting, or hematemesis; No diarrhea or constipation. No melena or hematochezia.  GENITOURINARY: No dysuria, frequency or hematuria  NEUROLOGICAL: No numbness or weakness  SKIN: Left ankle and thigh wound.   All other review of systems is negative unless indicated above    Vital Signs Last 24 Hrs  T(C): 36.3 (12 May 2019 12:09), Max: 37 (11 May 2019 21:45)  T(F): 97.4 (12 May 2019 12:09), Max: 98.6 (11 May 2019 21:45)  HR: 70 (12 May 2019 12:09) (64 - 79)  BP: 130/45 (12 May 2019 12:09) (130/45 - 148/26)  RR: 18 (12 May 2019 12:09) (17 - 18)  SpO2: 100% (12 May 2019 12:09) (96% - 100%)    I&O's Summary    12 May 2019 07:01  -  12 May 2019 12:13  --------------------------------------------------------  IN: 360 mL / OUT: 0 mL / NET: 360 mL      PHYSICAL EXAM:  Constitutional: NAD, awake and alert, well-developed  HEENT: PERR, EOMI, Normal Hearing, MMM  Neck: Soft and supple, No LAD, No JVD. Right neck sutures  Respiratory: Diminished breath sounds on the right  Cardiovascular: S1 and S2, regular rate and rhythm, no Murmurs, gallops or rubs  Gastrointestinal: Bowel Sounds present, soft, nontender, nondistended, no guarding, no rebound  Extremities: No peripheral edema. R BKA, Left surgical site C/D/I. Left ankle wound not draining. IAD noted at buttocks.   Vascular: 2+ peripheral pulses  Neurological: A/O x 3, no focal deficits  Musculoskeletal: 5/5 strength b/l upper and lower extremities  Skin: No rashes    MEDICATIONS:  MEDICATIONS  (STANDING):  allopurinol 100 milliGRAM(s) Oral <User Schedule>  artificial  tears Solution 1 Drop(s) Both EYES three times a day  atorvastatin 20 milliGRAM(s) Oral at bedtime  cholestyramine Powder (Sugar-Free) 4 Gram(s) Oral daily  diltiazem    Tablet 60 milliGRAM(s) Oral four times a day  doxercalciferol Injectable 1 MICROGram(s) IV Push <User Schedule>  epoetin nelly Injectable 84590 Unit(s) IV Push <User Schedule>  finasteride 5 milliGRAM(s) Oral daily  gabapentin 300 milliGRAM(s) Oral daily  heparin  Injectable 5000 Unit(s) SubCutaneous every 8 hours  lidocaine/prilocaine Cream 1 Application(s) Topical daily  pantoprazole    Tablet 40 milliGRAM(s) Oral before breakfast  predniSONE   Tablet 5 milliGRAM(s) Oral every other day  predniSONE   Tablet 2.5 milliGRAM(s) Oral every other day  silver sulfADIAZINE 1% Cream 1 Application(s) Topical daily      LABS: All Labs Reviewed:                        9.9    13.16 )-----------( 213      ( 12 May 2019 06:46 )             33.3     05-12    139  |  108  |  51<H>  ----------------------------<  73  5.1   |  23  |  3.74<H>    Ca    7.9<L>      12 May 2019 06:46      PT/INR - ( 12 May 2019 06:46 )   PT: 50.2 sec;   INR: 4.32 ratio

## 2019-05-12 NOTE — CONSULT NOTE ADULT - CONSULT REQUESTED DATE/TIME
02-May-2019 10:37
02-May-2019 16:48
12-May-2019 10:48
19-Apr-2019 11:54
19-Apr-2019 22:03
20-Apr-2019 10:24
29-Apr-2019 10:29
20.47

## 2019-05-12 NOTE — PROGRESS NOTE ADULT - ASSESSMENT
85 y/o male with ESRD on HD (MWF), HTN, RA on MTX and steroids, R leg amputation, severe CDI and megacolon, Severe PAD s/p LLE endarterectomy 2 weeks ago with dr dugan presents with T 100 and DC from L groin.  Pt also c/o R arm pain/swelling where pt had fistulogram of the AC fistula on last admission.  RUL dopplers negative for DVT.  L groin sono--negative for pseudo aneurysm and + hematoma.  Pt given 2.1 L IVF, IV vanco/aztreonam in ED  On my exam in HD suite, awake, chronically ill appearing, NAD, daughter at bedside. Pt seen by Dr dugan in ED.  CXR--clear    4/20  s/p hd yesterday  prolonged bleed from access stopped w pressure  feels well  arm less tender  received 1 u prbc on hd yesterday  monitor cbc    4/22 SY  --ESRD : HD order and tx reviewed.   Tolerating tx well.  --AVF : as above.  Prolonged bleeding post tx.  Venous pressure acceptable today.  Monitor AVF function.  --Anemia : with acute loss.  Active infection leading to LETICIA hyporesponsiveness.  Transfuse 2 units today.  --PAD : post Left Ileofemoral Endarterectomy : Monitor Left foot perfusion.  --ID ; Left Groin infection : Continue abtx.  For debridement in am.    4/23  as above  For HD Wednesday  groin wash out today    4/24 SY  --ESRD : HD order and tx reviewed.  Tolerating tx well.  AVF cannulated without difficulty.  --Left Groin wound --Post  Muscle Flap Coverage.  Continue abtx.  --PAD : monitor perfusion.  --ID : continue Cefepime.    4/25 SY  --ESRD : Continue TIW HD  --AVF : cannulated without difficulty.  RUE edema much improved.  --Left Groin wound : post Muscle Flap Coverage.   Continue abtx.  --ID: continue abtx.  --PAD : post Left Ileofemoral endarterectomy : monitor perfusion.    4/26  seen on hd  in good spirits   stable on hd  fluid removal reduced due to low BP  hgb stable    4/27 SY  --ESRD : Continue TIW HD  --AVF : functioning well.  --PAD : Post Left Ileofemoral Endarterectomy : monitor perfusion  --Left Groin Wound : post Muscle Flap Coverage : continue abtx and wound care.    4/28 SY  --ESRD : for HD in am  --AVF : functioning well with improved cannulation.  --PAD : monitor perfusion ( Post Left Ileofemoral Endarterectomy)  --Left Groin wound : post Muscle Flap : continue abtx and wound care.    4/29 MK   - ESRD: tolerting hd, AVF cannultion well  - inc sputum production with possible endobronchial mass: dr bush input noted, may need bronch  - AMS with more confusion and jerking motion: pain meds have been limited, will get ammonia and abg value, has not been using bipap during hospitalization  - [place]: fu h/h   - left groin wound s/p muscle flap coverage with s/p left ileofemoral endarterectomy      4/30 SY  --ESRD : Continue TIW HD  --Pulm : Hemoptysis resolved.  New RML PNA and FERNANDO nodule   improved resp status . Now PNA with mucus plugging and new findings of lesion.  For outpt work up once clinically improved.  --AMS ;resolved and at baseline.  Attempt to minimize pain meds.  --PAD : Monitor Left groin and LE wound.  --ID : continue Cefepime and Daptomycin with po vanco for C diff prophylaxis.    5/1 SY  --ESRD :HD order and tx reviewed.    --Pulm : New RML PNA and FERNANDO nodule.  Pulmonary follow up appreciated.  Continue ABTX.  --AMS : resolved and stable.  --Increasing Leukocytosis : continue abtx.  D/w Dr Flanagan.  --PAD : LLE pain improved. (post Left Ileofemoral endarterectomy last admission.    5/2  Some av access arm pain last hd improved w repositioning  stable vitals  pulmonary/ thoracic surgery input noted  d/w Pt daughter    5/3  arm feels better today  for dialysis later  sacral evaluation for possible intervention    5/4 MK  - ESRD on hd: tolerating uf with hd   - Afib on AC: cardizem and titrate as needed  - Anemia: epo   - PNA with mucus plug and possible endobronchial lesion, with rising leukocytosis await timing of bronch  - rising leukocytosis with hx of cdiff: no diarrhea   LM with dr bush   bronch to be done by CTS, Dr malave, if ffp needed, will do hd with ffp in am followed by bronch ...    5/7 SY  --RML opacity : post Bronchoscopy .  Positive for endobronchial lesion.   no biopsy done.   Follow up Cytology/Cultures.  Further options to be discussed with family.  --ESRD : for HD in am.  --A FIB : Continue Cardizem for rate control and to restart a/c,  --Leukocytosis : Increasing  : Post Bronch.  Continue abtx.    5/8 SY  --ESRD : HD order and tx reviewed.  Tolerating tx well.  --Pulmonary : Positive Endobronchial lesion.  No further intervention at present and follow up as outpt,  --A FIB : continue Cardizem.  Restarted on A/C.  --Leukocytosis ;  Slightly improved. Off all abtx since 5/2.  Remains afebrile.    5/9  ESRD  on HD   For out pt PET scan, endobronchial lesion   HD am    5/10 MK   - ESRD tolerating hd   - MBD: dc sevelamer, cont hectrol   - + endobronchial lesion: as per pul/cts await onc input.    - stable leukocytosis, off abx     5/12  Dr Mcneill input noted  for outpt staging  treatment options as per pts eligibility and staging results

## 2019-05-12 NOTE — CONSULT NOTE ADULT - MUSCULOSKELETAL COMMENTS
denies pain in recently operated left lower extremity. 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

## 2019-05-12 NOTE — CONSULT NOTE ADULT - ATTENDING COMMENTS
83 y/o gentleman with extensive smoking history and  with multiple medical problems now with newly diagnosed non-small cell carcinoma of lung per positive bronchoscopy obtained brushing. For evaluation and consideration of radiation therapy.  Full Recommendation pending.

## 2019-05-12 NOTE — CONSULT NOTE ADULT - ASSESSMENT
83 y/o gentleman with extensive smoking history and  with multiple medical problems now with newly diagnosed non-small cell carcinoma of lung per positive bronchoscopy obtained brushing. For evaluation and consideration of radiation therapy.      RECOMMEDATION:    1. Agree with Med Onc suggestion for further staging work up including axial imaging of abdomen and pelvis , ideally PET/CT as outpatient. Would include brain MRI.  2. If no evidence of locally advanced or distant malignant disease, would consider stereotactic radiotherapy if otherwise an appropriate candidate. Could also consider more conventional fractionated radiotherapy as well , although would probably choose an accelerated hypofractionated regimen give maggieeint's overall poor performance status and potential issues with transportation for daily RT. Also with regards to patient's age, mulitple co-morbidities and  very modest  performance status (at best ECOG 3 or KPS of 50) would doubt maggieeint is a suitable candidate for systemic therapy of chemotherapy or probably even immunotherapy (if even a candidate).  Will follow results of staging work up and confer with healthcare team for ultimate radiotherapy recommendation.

## 2019-05-13 LAB
ALBUMIN SERPL ELPH-MCNC: 1.8 G/DL — LOW (ref 3.3–5)
ANION GAP SERPL CALC-SCNC: 10 MMOL/L — SIGNIFICANT CHANGE UP (ref 5–17)
ANISOCYTOSIS BLD QL: SIGNIFICANT CHANGE UP
BASOPHILS # BLD AUTO: 0.05 K/UL — SIGNIFICANT CHANGE UP (ref 0–0.2)
BASOPHILS NFR BLD AUTO: 0.4 % — SIGNIFICANT CHANGE UP (ref 0–2)
BIZARRE PLATELETS BLD QL SMEAR: PRESENT — SIGNIFICANT CHANGE UP
BUN SERPL-MCNC: 74 MG/DL — HIGH (ref 7–23)
BURR CELLS BLD QL SMEAR: PRESENT — SIGNIFICANT CHANGE UP
CALCIUM SERPL-MCNC: 7.5 MG/DL — LOW (ref 8.5–10.1)
CHLORIDE SERPL-SCNC: 107 MMOL/L — SIGNIFICANT CHANGE UP (ref 96–108)
CO2 SERPL-SCNC: 22 MMOL/L — SIGNIFICANT CHANGE UP (ref 22–31)
CREAT SERPL-MCNC: 4.65 MG/DL — HIGH (ref 0.5–1.3)
DACRYOCYTES BLD QL SMEAR: SIGNIFICANT CHANGE UP
ELLIPTOCYTES BLD QL SMEAR: SLIGHT — SIGNIFICANT CHANGE UP
EOSINOPHIL # BLD AUTO: 0.1 K/UL — SIGNIFICANT CHANGE UP (ref 0–0.5)
EOSINOPHIL NFR BLD AUTO: 0.8 % — SIGNIFICANT CHANGE UP (ref 0–6)
GLUCOSE SERPL-MCNC: 96 MG/DL — SIGNIFICANT CHANGE UP (ref 70–99)
HCT VFR BLD CALC: 30.4 % — LOW (ref 39–50)
HGB BLD-MCNC: 9.3 G/DL — LOW (ref 13–17)
IMM GRANULOCYTES NFR BLD AUTO: 0.9 % — SIGNIFICANT CHANGE UP (ref 0–1.5)
INR BLD: 4.05 RATIO — HIGH (ref 0.88–1.16)
LYMPHOCYTES # BLD AUTO: 0.53 K/UL — LOW (ref 1–3.3)
LYMPHOCYTES # BLD AUTO: 4.5 % — LOW (ref 13–44)
MACROCYTES BLD QL: SLIGHT — SIGNIFICANT CHANGE UP
MANUAL SMEAR VERIFICATION: SIGNIFICANT CHANGE UP
MCHC RBC-ENTMCNC: 30.6 GM/DL — LOW (ref 32–36)
MCHC RBC-ENTMCNC: 32.2 PG — SIGNIFICANT CHANGE UP (ref 27–34)
MCV RBC AUTO: 105.2 FL — HIGH (ref 80–100)
MICROCYTES BLD QL: SIGNIFICANT CHANGE UP
MONOCYTES # BLD AUTO: 1.24 K/UL — HIGH (ref 0–0.9)
MONOCYTES NFR BLD AUTO: 10.4 % — SIGNIFICANT CHANGE UP (ref 2–14)
NEUTROPHILS # BLD AUTO: 9.84 K/UL — HIGH (ref 1.8–7.4)
NEUTROPHILS NFR BLD AUTO: 83 % — HIGH (ref 43–77)
OVALOCYTES BLD QL SMEAR: SIGNIFICANT CHANGE UP
PHOSPHATE SERPL-MCNC: 3.5 MG/DL — SIGNIFICANT CHANGE UP (ref 2.5–4.5)
PLAT MORPH BLD: NORMAL — SIGNIFICANT CHANGE UP
PLATELET # BLD AUTO: 210 K/UL — SIGNIFICANT CHANGE UP (ref 150–400)
POIKILOCYTOSIS BLD QL AUTO: SIGNIFICANT CHANGE UP
POLYCHROMASIA BLD QL SMEAR: SLIGHT — SIGNIFICANT CHANGE UP
POTASSIUM SERPL-MCNC: 4.9 MMOL/L — SIGNIFICANT CHANGE UP (ref 3.5–5.3)
POTASSIUM SERPL-SCNC: 4.9 MMOL/L — SIGNIFICANT CHANGE UP (ref 3.5–5.3)
PROTHROM AB SERPL-ACNC: 46.9 SEC — HIGH (ref 10–12.9)
RBC # BLD: 2.89 M/UL — LOW (ref 4.2–5.8)
RBC # FLD: 21.5 % — HIGH (ref 10.3–14.5)
RBC BLD AUTO: ABNORMAL
SODIUM SERPL-SCNC: 139 MMOL/L — SIGNIFICANT CHANGE UP (ref 135–145)
WBC # BLD: 11.87 K/UL — HIGH (ref 3.8–10.5)
WBC # FLD AUTO: 11.87 K/UL — HIGH (ref 3.8–10.5)

## 2019-05-13 PROCEDURE — 74177 CT ABD & PELVIS W/CONTRAST: CPT | Mod: 26

## 2019-05-13 PROCEDURE — 70553 MRI BRAIN STEM W/O & W/DYE: CPT | Mod: 26,52

## 2019-05-13 RX ORDER — OXYCODONE AND ACETAMINOPHEN 5; 325 MG/1; MG/1
1 TABLET ORAL EVERY 4 HOURS
Refills: 0 | Status: DISCONTINUED | OUTPATIENT
Start: 2019-05-14 | End: 2019-05-15

## 2019-05-13 RX ADMIN — Medication 650 MILLIGRAM(S): at 18:37

## 2019-05-13 RX ADMIN — CHOLESTYRAMINE 4 GRAM(S): 4 POWDER, FOR SUSPENSION ORAL at 09:27

## 2019-05-13 RX ADMIN — LIDOCAINE AND PRILOCAINE CREAM 1 APPLICATION(S): 25; 25 CREAM TOPICAL at 11:25

## 2019-05-13 RX ADMIN — ATORVASTATIN CALCIUM 20 MILLIGRAM(S): 80 TABLET, FILM COATED ORAL at 22:38

## 2019-05-13 RX ADMIN — ERYTHROPOIETIN 10000 UNIT(S): 10000 INJECTION, SOLUTION INTRAVENOUS; SUBCUTANEOUS at 15:05

## 2019-05-13 RX ADMIN — Medication 1 APPLICATION(S): at 18:36

## 2019-05-13 RX ADMIN — HEPARIN SODIUM 5000 UNIT(S): 5000 INJECTION INTRAVENOUS; SUBCUTANEOUS at 22:39

## 2019-05-13 RX ADMIN — DOXERCALCIFEROL 1 MICROGRAM(S): 2.5 CAPSULE ORAL at 15:07

## 2019-05-13 RX ADMIN — OXYCODONE AND ACETAMINOPHEN 1 TABLET(S): 5; 325 TABLET ORAL at 19:53

## 2019-05-13 RX ADMIN — OXYCODONE AND ACETAMINOPHEN 1 TABLET(S): 5; 325 TABLET ORAL at 21:15

## 2019-05-13 RX ADMIN — Medication 650 MILLIGRAM(S): at 13:28

## 2019-05-13 RX ADMIN — Medication 1 DROP(S): at 22:39

## 2019-05-13 RX ADMIN — Medication 60 MILLIGRAM(S): at 18:28

## 2019-05-13 RX ADMIN — Medication 1 DROP(S): at 06:15

## 2019-05-13 RX ADMIN — GABAPENTIN 300 MILLIGRAM(S): 400 CAPSULE ORAL at 18:33

## 2019-05-13 RX ADMIN — Medication 2.5 MILLIGRAM(S): at 18:35

## 2019-05-13 RX ADMIN — FINASTERIDE 5 MILLIGRAM(S): 5 TABLET, FILM COATED ORAL at 18:33

## 2019-05-13 RX ADMIN — HEPARIN SODIUM 5000 UNIT(S): 5000 INJECTION INTRAVENOUS; SUBCUTANEOUS at 06:15

## 2019-05-13 RX ADMIN — PANTOPRAZOLE SODIUM 40 MILLIGRAM(S): 20 TABLET, DELAYED RELEASE ORAL at 06:15

## 2019-05-13 NOTE — PROGRESS NOTE ADULT - ASSESSMENT
83 y/o male with ESRD on HD (MWF), HTN, RA on MTX and steroids, R leg amputation, severe CDI and megacolon, Severe PAD s/p LLE endarterectomy (4/1/19) with dr Clement presented with fever (100.5) and DC from L groin. Pt was noted to have lung mass increased in size.       #Malignant Lung mass - favors squamous  - Will need PET outpatient  - s/p bronchoscopy 5/6  - Repeat imaging in 2-3 months  - CT Chest scan noted R endobronchial mass enlarging from previous CT and new L lung mass  - CT A/P: No evidence of mets  - MRI brain noted above  - Pulm, CT surgery, heme/onc, Rad/onc consult appreciated  - D/C planning with outpatient workup for mets.     #Supratherapeutic INR  - Improving  - Continue to hold Coumadin  - Daily INR    #Surgical site infection s/p endarterectomy 4/1   - Continue daily dressing as recommended by plastic surgery and wound care team.   - Continue pain control with Tylenol PRN, tramadol PRN and percocet PRN.   - ID, Vascular and Plastic surgery consult appreciated    #Thyroid Nodule suspicion for malignancy  - Nodule noted on US  - Repeat US in 6 months outpatient. 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 diff diarrhea with megacolon  - Monitor off abx  - ID recs appreciated    #PAF    - CHADS2 score of 3 (Age, CHF, HTN)  - Continue to hold Coumadin in the setting supratherapeutic INR  - Daily INR    #ESRD on HD M,W,F  - continue dialysis  - Nephro recs appreciated.     #Chronic diastolic CHF -compensated  - Last echo 2018 EF of 60-65%.     #PVD  - s/p R AKA    #Prophylactic Measure  - Continue to hold coumadin in the setting of supratherapeutic INR

## 2019-05-13 NOTE — PROGRESS NOTE ADULT - SUBJECTIVE AND OBJECTIVE BOX
Hospital Course: 85 y/o male with ESRD on hemodialysis (MWF), hypertension, rheumatoid arthritis, on methotrexate and steroids, right leg amputation, severe CDI and megacolon, Severe peripheral artery disease who had a left lower extremity endarterectomy (4/1/19)with dr Clement who presents with a temperature of 100.5 and discharge from the left groin. The patient had a duplex US that showed a hematoma of the left groin. The patient also complains of right arm pain and swelling where the patient had a fistulogram of the AC fistula on last admission. The patient was started on vancomycin given his history recurrent c. diff infection as well as aztreonam for the wound. The aztreonam was discontinued and the patient was started on daptomycin and cefepime for VRE bacteria from the wound site and possible pna found on chest xray. During the hospital course the patient was pancytopenic so the methotrexate was held and it resolved. Vascular surgery did debridement of the wound site as well as a muscle flap. While in the hospital the pt was coughing up blood which lead to a CT of the chest. This revealed an enlarging right middle lobe opacity, a new FERNANDO nodule, and a thyroid nodule. The US of the thyroid recommend FNA outpatient. A PET scan was also recommended for the outpatient. The patient had a flexible bronchoscopy on 5/6. Path report yielded nonsmall cell carcinoma, favoring squamous cell. Pt was seen by Heme/onc and rad onc. CT Abdomen and pelvis and MRI of the brain was done to evaluate for metastasis which showed no evidence.     5/13:  Pt was seen and examined. No overnight events or acute complaints. Pt underwent MRI today prior to HD to evaluate for mets. INR still supratherapeutic. Denies s/s of bleeding.  Spoke to daughter, Anamika HCP, about pt update and hospital course. Also discussed d/c planning to rehab tomorrow. HCP and pt agrees. Xenia BURRIS following. [facility] and [facility] and they will follow up with Dr. Pierre, Osito Parks and Dr. Maxwell for PET scan and outpatient management.   Left a voice message for Xenia BURRIS about conversation with Anamika and anticipating d/c planning for tomorrow.        REVIEW OF SYSTEMS:  CONSTITUTIONAL: No weakness, fevers or chills  EYES/ENT: No visual changes;  No vertigo or throat pain   NECK: No pain or stiffness  RESPIRATORY: No cough, wheezing, hemoptysis; No shortness of breath  CARDIOVASCULAR: No chest pain or palpitations  GASTROINTESTINAL: No abdominal or epigastric pain. No nausea, vomiting, or hematemesis; No diarrhea or constipation. No melena or hematochezia.  GENITOURINARY: No dysuria, frequency or hematuria  NEUROLOGICAL: No numbness or weakness  SKIN: Left ankle and thigh wound.   All other review of systems is negative unless indicated above    Vital Signs Last 24 Hrs  T(C): 37.1 (13 May 2019 11:45), Max: 37.1 (13 May 2019 11:45)  T(F): 98.8 (13 May 2019 11:45), Max: 98.8 (13 May 2019 11:45)  HR: 87 (13 May 2019 14:06) (69 - 98)  BP: 143/33 (13 May 2019 14:06) (117/43 - 168/48)  RR: 16 (13 May 2019 13:52) (15 - 18)  SpO2: 98% (13 May 2019 06:00) (98% - 100%)    I&O's Summary    12 May 2019 07:01  -  13 May 2019 07:00  --------------------------------------------------------  IN: 360 mL / OUT: 0 mL / NET: 360 mL      PHYSICAL EXAM:  Constitutional: NAD, awake and alert, well-developed  HEENT: PERR, EOMI, Normal Hearing, MMM  Neck: Soft and supple, No LAD, No JVD. Right neck sutures  Respiratory: Diminished breath sounds on the right  Cardiovascular: S1 and S2, regular rate and rhythm, no Murmurs, gallops or rubs  Gastrointestinal: Bowel Sounds present, soft, nontender, nondistended, no guarding, no rebound  Extremities: No peripheral edema. R BKA, Left surgical site C/D/I. Left ankle wound not draining. IAD noted at buttocks.   Vascular: 2+ peripheral pulses  Neurological: A/O x 3, no focal deficits  Musculoskeletal: 5/5 strength b/l upper and lower extremities  Skin: No rashes    MEDICATIONS:  MEDICATIONS  (STANDING):  allopurinol 100 milliGRAM(s) Oral <User Schedule>  artificial  tears Solution 1 Drop(s) Both EYES three times a day  atorvastatin 20 milliGRAM(s) Oral at bedtime  cholestyramine Powder (Sugar-Free) 4 Gram(s) Oral daily  diltiazem    Tablet 60 milliGRAM(s) Oral four times a day  doxercalciferol Injectable 1 MICROGram(s) IV Push <User Schedule>  epoetin nelly Injectable 50115 Unit(s) IV Push <User Schedule>  finasteride 5 milliGRAM(s) Oral daily  gabapentin 300 milliGRAM(s) Oral daily  heparin  Injectable 5000 Unit(s) SubCutaneous every 8 hours  lidocaine/prilocaine Cream 1 Application(s) Topical daily  pantoprazole    Tablet 40 milliGRAM(s) Oral before breakfast  predniSONE   Tablet 5 milliGRAM(s) Oral every other day  predniSONE   Tablet 2.5 milliGRAM(s) Oral every other day  silver sulfADIAZINE 1% Cream 1 Application(s) Topical daily      LABS: All Labs Reviewed:                        9.3    11.87 )-----------( 210      ( 13 May 2019 12:15 )             30.4     05-13    139  |  107  |  74<H>  ----------------------------<  96  4.9   |  22  |  4.65<H>    Ca    7.5<L>      13 May 2019 12:15  Phos  3.5     05-13    TPro  x   /  Alb  1.8<L>  /  TBili  x   /  DBili  x   /  AST  x   /  ALT  x   /  AlkPhos  x   05-13    PT/INR - ( 13 May 2019 12:15 )   PT: 46.9 sec;   INR: 4.05 ratio        RADIOLOGY/EKG:    < from: CT Abdomen and Pelvis w/ IV Cont (05.13.19 @ 10:37) >    EXAM:  CT ABDOMEN AND PELVIS IC                            PROCEDURE DATE:  05/13/2019          INTERPRETATION:  Clinical information: Lung malignancy. Assess for   metastatic disease.    COMPARISON: October 05, 2018, May 05, 2017    PROCEDURE:   CT of the Abdomen and Pelvis was performed with intravenous contrast.   Intravenous contrast: 90 ml Omnipaque 350. 10 ml discarded.  Oral contrast: Not administered.  Sagittal and coronal reformats were performed.    FINDINGS:    LOWER CHEST: Coronary artery calcifications. Lobulated mass in the right   middle lobe again noted as seen on CT chest April 28, 2019. Trace right   pleural effusion.    LIVER: Stable wedge-shaped hypodense focus in the anterior liver, near   the dome, indeterminate but unchanged dating back to May 05, 2017  SPLEEN: Within normal limits.  PANCREAS: Within normal limits.  GALLBLADDER: Cholelithiasis.  BILE DUCTS: Normal caliber.  ADRENALS: Within normal limits.  KIDNEYS/URETERS: No mass, stone or hydronephrosis. Bilateral renal cysts.    RETROPERITONEUM: No lymphadenopathy.    VESSELS:  Severe atherosclerotic calcification.  Normal caliber aorta.   IVC filter in place.    BOWEL: No bowel obstruction, wall thickening or inflammatory change.   Appendix normal.  PERITONEUM: No ascites or pneumoperitoneum.    REPRODUCTIVE ORGANS: The prostate and seminal vesicles are within normal   limits.  BLADDER: Within normal limits.    ABDOMINAL WALL: Postsurgical changes in both groins.  BONES: No acute bony abnormality. Stable moderate T12 compression   deformity.    IMPRESSION: No evidence of metastatic disease.    < end of copied text >      < from: MR Head w/wo IV Cont Disc (05.13.19 @ 11:59) >    EXAM:  MR BRAIN WAW IC DC                            PROCEDURE DATE:  05/13/2019          INTERPRETATION:      MR brain with and without gadolinium      CLINICAL INFORMATION:   Evaluate for malignancy      TECHNIQUE:    axial T1-weighted images, axial FLAIR images of the brain   were obtained. Following 5 cc of Gadavist administration, .5 cc   discarded, isotropic volumetric and fast spin echo T1-weighted images   were obtained; this data was reformatted using image post processing   software in multiple imaging planes.    FINDINGS:   MRI dated 3/2/2018 is available for review.    The brain demonstrates mild to moderate periventricular white matter   ischemia. Tiny old lacunar infarctions are seen in the RIGHT caudate   nucleus and BILATERAL external capsules. Following gadolinium   administration no abnormal enhancement occurs.  No acute cerebral   cortical infarct is found.   No intracranial hemorrhage is recognized.    No mass effect is found in the brain.    The ventricles, sulci and basal cisterns appear unremarkable.    The vertebral and internal carotid arteries demonstrate expected flow   voids indicating their patency.    The orbits are unremarkable.  The paranasal sinuses are significant for   small retention cysts in the BILATERAL maxillary sinuses. Mild BILATERAL   mastoiditis. The nasal cavity appears intact.  The nasopharynx is   symmetric.  The central skull base and petrous temporal bones are intact.    The calvarium appears unremarkable.      IMPRESSION:  Mild to moderate periventricular white matter ischemia. Tiny   old lacunar infarctions are seen in the RIGHT caudate nucleus and   BILATERAL external capsules.  Small retention cysts in the BILATERAL   maxillary sinuses. Mild BILATERAL mastoiditis.    < end of copied text >

## 2019-05-13 NOTE — PROGRESS NOTE ADULT - SUBJECTIVE AND OBJECTIVE BOX
NEPHROLOGY INTERVAL HPI/OVERNIGHT EVENTS:    Date of Service: 05-13-19 @ 15:28  5/13 SY  No acute events overnight.  Reports feeling fairly well.    5/12  feels well  Dr Mcneill input noted   for outpt staging of bronchial lesion    05-10-19 @ 10:13  seen at   pulmonary and CTS input noted.    no new c/o appetite fair, no sob, cough improved  no diarrhea    MEDICATIONS  (STANDING):  allopurinol 100 milliGRAM(s) Oral <User Schedule>  artificial  tears Solution 1 Drop(s) Both EYES three times a day  atorvastatin 20 milliGRAM(s) Oral at bedtime  cholestyramine Powder (Sugar-Free) 4 Gram(s) Oral daily  diltiazem    Tablet 60 milliGRAM(s) Oral four times a day  doxercalciferol Injectable 1 MICROGram(s) IV Push <User Schedule>  epoetin nelly Injectable 01366 Unit(s) IV Push <User Schedule>  finasteride 5 milliGRAM(s) Oral daily  gabapentin 300 milliGRAM(s) Oral daily  heparin  Injectable 5000 Unit(s) SubCutaneous every 8 hours  lidocaine/prilocaine Cream 1 Application(s) Topical daily  pantoprazole    Tablet 40 milliGRAM(s) Oral before breakfast  predniSONE   Tablet 5 milliGRAM(s) Oral every other day  predniSONE   Tablet 2.5 milliGRAM(s) Oral every other day  silver sulfADIAZINE 1% Cream 1 Application(s) Topical daily    MEDICATIONS  (PRN):  acetaminophen   Tablet .. 650 milliGRAM(s) Oral every 6 hours PRN Mild Pain (1 - 3)  ALBUTerol    90 MICROgram(s) HFA Inhaler 2 Puff(s) Inhalation every 6 hours PRN Shortness of Breath and/or Wheezing  guaiFENesin   Syrup  (Sugar-Free) 100 milliGRAM(s) Oral every 6 hours PRN Cough  oxyCODONE    5 mG/acetaminophen 325 mG 1 Tablet(s) Oral every 4 hours PRN Moderate Pain (4 - 6)    Vital Signs Last 24 Hrs  T(C): 37.1 (13 May 2019 11:45), Max: 37.1 (13 May 2019 11:45)  T(F): 98.8 (13 May 2019 11:45), Max: 98.8 (13 May 2019 11:45)  HR: 94 (13 May 2019 14:36) (69 - 98)  BP: 166/45 (13 May 2019 14:36) (117/43 - 168/48)  BP(mean): --  RR: 15 (13 May 2019 14:36) (15 - 18)  SpO2: 98% (13 May 2019 06:00) (98% - 100%)  Daily     Daily     05-12 @ 07:01  -  05-13 @ 07:00  --------------------------------------------------------  IN: 360 mL / OUT: 0 mL / NET: 360 mL    PHYSICAL EXAM:  Alert and appropriate  GENERAL: no distress  CHEST/LUNG: Right base rhonchi  HEART: S1S2 Irreg.  ABDOMEN: soft  EXTREMITIES: positive edema  SKIN:     LABS:                        9.3    11.87 )-----------( 210      ( 13 May 2019 12:15 )             30.4     05-13    139  |  107  |  74<H>  ----------------------------<  96  4.9   |  22  |  4.65<H>    Ca    7.5<L>      13 May 2019 12:15  Phos  3.5     05-13    TPro  x   /  Alb  1.8<L>  /  TBili  x   /  DBili  x   /  AST  x   /  ALT  x   /  AlkPhos  x   05-13    PT/INR - ( 13 May 2019 12:15 )   PT: 46.9 sec;   INR: 4.05 ratio             Phosphorus Level, Serum: 3.5 mg/dL (05-13 @ 12:15)          RADIOLOGY & ADDITIONAL TESTS:

## 2019-05-13 NOTE — PROGRESS NOTE ADULT - SUBJECTIVE AND OBJECTIVE BOX
Patient is a 84y old  Male who presents with a chief complaint of L groin DC (28 Apr 2019 17:14)      HPI:  85 y/o male with ESRD on HD (MWF), HTN, RA on MTX and steroids, R leg amputation, severe CDI and megacolon, Severe PAD s/p LLE endarterectomy 2 weeks ago with dr dugan presents with T 100 and DC from L groin.  Pt also c/o R arm pain/swelling where pt had fistulogram of the AC fistula on last admission.  RUL dopplers negative for DVT.  L groin sono--negative for pseudo aneurysm and + hematoma.  Pt given 2.1 L IVF, IV vanco/aztreonam in ED  On my exam in HD suite, awake, chronically ill appearing, NAD, daughter at bedside. Pt seen by Dr dugan in ED.  CXR--clear    TTE (10/18)--mild-mod MR/EF 60%    Pt d/w daughter regarding advance directives--Pt is FULL RESUSCITATION (19 Apr 2019 16:12)  events noted while admitted muscle flap and plastic surgery care developed increased cough with possible bloody streaked sputuim / none present now and RN at bedside, both noticed thhick yellow sputum production    4/30  data discussed with RN at bedside  no cp  improved breathing    5/1  feels the same  decreased cough  DTR is contacted and data discussed regarding abnormal ct scan findings  5/2  data discussed with pt's DTR and medical team as well  although BRONCH is appropriate given ct scan findings, it can not be done yet till INR corrected specially in pt with ESRD.  DATA DISCUSSED WITH DR JORGE MASSEY AS WELL for thoracic surgery consult  5/3  data discussed with thoracic surgery yesterday  discussed with dr Rashid today  pt is resting comfortably  no resp distress  no hemoptysis    5/4  No acute events occurred overnight  Discussed with hospitalist/resident team and daughter     5/5  INR still elevated   No acute pulmonary events occurred overnight  5/6 uneventful night  in dialysis  bronch postponed till INR corrected  5/7  s/p BRONCH without bx yesterday afternoon  tolerated well  POSITIVE ENDOBRONCHIAL LESION RML  no hemoptysis this am  feels ok  5/8  Bronch findings discussed with pt  plan rev with thoracic surgery with plan for fu imaging in 2-3 months  no active sxs and no reported hemoptysis  pt is not a good surgical candidate at this time  5/9  pt is resting comfortably  no hemoptysis  will need to discuss path data with family  pos brushing for NSC lung ca, favor Sq cell ca  5/10  pt is awake and alert  Bronch findings along with Brushings being positive for NSC lung cancer discussed with Dtr yesterday afternoon and with pt this am  all their questions are answered  appreciate thoracic surgery presenting his case at [facility] this am  no hemoptysis  radiation oncologist input would also be appreciated in view of Endobronchial mass RML  5/11  all recent data discussed with pt and his family at bedside today  questions answered  RML lesion/ mass as well as FERNANDO pulm nodule discussed  no hemoptysis  5/13  appreciate oncology and RT consult  data discussed with pt today  he is in agreement to [proceed with this approach  no hemoptysis  no difficulty breathing  MEDICATIONS  (STANDING):  allopurinol 100 milliGRAM(s) Oral <User Schedule>  artificial tears (preservative free) Ophthalmic Solution 1 Drop(s) Both EYES three times a day  atorvastatin 20 milliGRAM(s) Oral at bedtime  cholestyramine Powder (Sugar-Free) 4 Gram(s) Oral daily  diltiazem    Tablet 60 milliGRAM(s) Oral four times a day  doxercalciferol Injectable 1 MICROGram(s) IV Push <User Schedule>  epoetin nelly Injectable 85447 Unit(s) IV Push <User Schedule>  finasteride 5 milliGRAM(s) Oral daily  gabapentin 300 milliGRAM(s) Oral daily  hydrocortisone sodium succinate Injectable 100 milliGRAM(s) IV Push every 8 hours  lidocaine/prilocaine Cream 1 Application(s) Topical daily  pantoprazole    Tablet 40 milliGRAM(s) Oral before breakfast  predniSONE   Tablet 5 milliGRAM(s) Oral every other day  predniSONE   Tablet 2.5 milliGRAM(s) Oral every other day  sevelamer carbonate 800 milliGRAM(s) Oral three times a day with meals  silver sulfADIAZINE 1% Cream 1 Application(s) Topical daily    MEDICATIONS  (PRN):  acetaminophen   Tablet .. 650 milliGRAM(s) Oral every 6 hours PRN Temp greater or equal to 38.5C (101.3F)  acetaminophen   Tablet .. 650 milliGRAM(s) Oral every 6 hours PRN Mild Pain (1 - 3)  ALBUTerol    90 MICROgram(s) HFA Inhaler 2 Puff(s) Inhalation every 6 hours PRN Shortness of Breath and/or Wheezing  guaiFENesin   Syrup  (Sugar-Free) 100 milliGRAM(s) Oral every 6 hours PRN Cough  oxyCODONE    5 mG/acetaminophen 325 mG 1 Tablet(s) Oral every 4 hours PRN Moderate Pain (4 - 6)    Vital Signs Last 24 Hrs  T(C): 36.9 (11 May 2019 05:20), Max: 37.3 (10 May 2019 16:27)  T(F): 98.4 (11 May 2019 05:20), Max: 99.2 (10 May 2019 16:27)  HR: 64 (11 May 2019 06:51) (61 - 80)  BP: 138/34 (11 May 2019 06:51) (99/29 - 168/90)  BP(mean): --  RR: 18 (11 May 2019 05:20) (16 - 18)  SpO2: 100% (11 May 2019 05:20) (96% - 100%)  HEENT: PERRL, conjunctiva clear, EOM's intact, non injected pharynx, no exudate, TM   normal  Neck: Supple, , no adenopathy, thyroid: not enlarged, no carotid bruit or JVD  Back: Symmetric, no  tenderness,no soft tissue tenderness  Lungs: Clear to auscultation bilateral,no adventitious breath sounds, normal   expiratory phase  Heart: Regular rate and rhythm, S1, S2 normal, no murmur, rub or gallop  Abdomen: Soft, non-tender, bowel sounds active , no hepatosplenomegaly  Extremities: no cyanosis or edema, no joint swelling, hx AKA right side  Skin: Skin color, texture normal, no rashes   Neurologic: Alert and oriented X3 , cranial nerves intact, sensory and motor normal,    ECG:    LABS:                                 10.7   20.09 )-----------( 361      ( 06 May 2019 07:45 )             34.9   05-06    137  |  105  |  48<H>  ----------------------------<  90  4.3   |  23  |  4.78<H>    Ca    8.1<L>      06 May 2019 07:45  Phos  4.1     05-06    TPro  x   /  Alb  2.0<L>  /  TBili  x   /  DBili  x   /  AST  x   /  ALT  x   /  AlkPhos  x   05-06               9.8    16.81 )-----------( 443      ( 01 May 2019 06:35 )             32.1   PT/INR - ( 06 May 2019 07:45 )   PT: 19.4 sec;   INR: 1.72 ratio                                 9.9    13.16 )-----------( 358      ( 30 Apr 2019 06:08 )             32.1                           10.1   7.96  )-----------( 334      ( 29 Apr 2019 07:08 )             33.2     04-29    04-30    142  |  111<H>  |  19  ----------------------------<  87  3.8   |  24  |  2.70<H>    Ca    8.3<L>      30 Apr 2019 06:08  Phos  3.4     04-29    TPro  x   /  Alb  1.8<L>  /  TBili  x   /  DBili  x   /  AST  x   /  ALT  x   /  AlkPhos  x   04-29  138  |  108  |  35<H>  ----------------------------<  95  4.4   |  22  |  4.61<H>    Ca    8.0<L>      29 Apr 2019 07:08              PT/INR - ( 29 Apr 2019 07:08 )   PT: 39.9 sec;   INR: 3.46 ratio                   RADIOLOGY & ADDITIONAL STUDIES:  < from: CT Chest No Cont (04.28.19 @ 12:17) >  PROCEDURE:   CT of the Chest was performed without intravenous contrast.  Sagittal and coronal reformats were performed.    FINDINGS:    LUNGS AND LARGE AIRWAYS: Emphysema. Continued increase size of tubular   oblong opacity in the right middle lobe, possibly endobronchial mass with   impacted airways. Possible associated broncholiths. Irregular opacity in   the posterior left upper lobe (series 3, image 60), measuring 1.4 cm.  PLEURA: Small left pleural effusion. Trace right pleural effusion.  VESSELS: Atherosclerotic calcifications.   HEART: Heart size is normal. No pericardial effusion.  MEDIASTINUM AND [place]: No lymphadenopathy.  CHEST WALL AND LOWER NECK: Left thyroid nodule, measuring 2.9 cm.   Extension of the thyroid gland into the superior mediastinum.   VISUALIZED UPPER ABDOMEN: Cholelithiasis. Unchanged probable cyst in   hepatic segment 8.  BONES: Degenerative changes of the spine.    IMPRESSION:   Continued increase size of tubular oblong opacity in the right middle   lobe, possibly endobronchial mass with impacted airways. Irregular   opacity in the posterior left upper lobe, measuring 1.4 cm. PET/CT is   recommended for further evaluation.    Left thyroid nodule, measuring 2.9 cm. Further evaluation with thyroid   ultrasound is recommended.    < end of copied text >

## 2019-05-13 NOTE — PROGRESS NOTE ADULT - ASSESSMENT
85 y/o male with ESRD on HD (MWF), HTN, RA on MTX and steroids, R leg amputation, severe CDI and megacolon, Severe PAD s/p LLE endarterectomy 2 weeks ago with dr dugan presents with T 100 and DC from L groin.  Pt also c/o R arm pain/swelling where pt had fistulogram of the AC fistula on last admission.  RUL dopplers negative for DVT.  L groin sono--negative for pseudo aneurysm and + hematoma.  Pt given 2.1 L IVF, IV vanco/aztreonam in ED  On my exam in HD suite, awake, chronically ill appearing, NAD, daughter at bedside. Pt seen by Dr dugan in ED.  CXR--clear    4/20  s/p hd yesterday  prolonged bleed from access stopped w pressure  feels well  arm less tender  received 1 u prbc on hd yesterday  monitor cbc    4/22 SY  --ESRD : HD order and tx reviewed.   Tolerating tx well.  --AVF : as above.  Prolonged bleeding post tx.  Venous pressure acceptable today.  Monitor AVF function.  --Anemia : with acute loss.  Active infection leading to LETICIA hyporesponsiveness.  Transfuse 2 units today.  --PAD : post Left Ileofemoral Endarterectomy : Monitor Left foot perfusion.  --ID ; Left Groin infection : Continue abtx.  For debridement in am.    4/23  as above  For HD Wednesday  groin wash out today    4/24 SY  --ESRD : HD order and tx reviewed.  Tolerating tx well.  AVF cannulated without difficulty.  --Left Groin wound --Post  Muscle Flap Coverage.  Continue abtx.  --PAD : monitor perfusion.  --ID : continue Cefepime.    4/25 SY  --ESRD : Continue TIW HD  --AVF : cannulated without difficulty.  RUE edema much improved.  --Left Groin wound : post Muscle Flap Coverage.   Continue abtx.  --ID: continue abtx.  --PAD : post Left Ileofemoral endarterectomy : monitor perfusion.    4/26  seen on hd  in good spirits   stable on hd  fluid removal reduced due to low BP  hgb stable    4/27 SY  --ESRD : Continue TIW HD  --AVF : functioning well.  --PAD : Post Left Ileofemoral Endarterectomy : monitor perfusion  --Left Groin Wound : post Muscle Flap Coverage : continue abtx and wound care.    4/28 SY  --ESRD : for HD in am  --AVF : functioning well with improved cannulation.  --PAD : monitor perfusion ( Post Left Ileofemoral Endarterectomy)  --Left Groin wound : post Muscle Flap : continue abtx and wound care.    4/29 MK   - ESRD: tolerting hd, AVF cannultion well  - inc sputum production with possible endobronchial mass: dr bush input noted, may need bronch  - AMS with more confusion and jerking motion: pain meds have been limited, will get ammonia and abg value, has not been using bipap during hospitalization  - [place]: fu h/h   - left groin wound s/p muscle flap coverage with s/p left ileofemoral endarterectomy      4/30 SY  --ESRD : Continue TIW HD  --Pulm : Hemoptysis resolved.  New RML PNA and FERNANDO nodule   improved resp status . Now PNA with mucus plugging and new findings of lesion.  For outpt work up once clinically improved.  --AMS ;resolved and at baseline.  Attempt to minimize pain meds.  --PAD : Monitor Left groin and LE wound.  --ID : continue Cefepime and Daptomycin with po vanco for C diff prophylaxis.    5/1 SY  --ESRD :HD order and tx reviewed.    --Pulm : New RML PNA and FERNANDO nodule.  Pulmonary follow up appreciated.  Continue ABTX.  --AMS : resolved and stable.  --Increasing Leukocytosis : continue abtx.  D/w Dr Flanagan.  --PAD : LLE pain improved. (post Left Ileofemoral endarterectomy last admission.    5/2  Some av access arm pain last hd improved w repositioning  stable vitals  pulmonary/ thoracic surgery input noted  d/w Pt daughter    5/3  arm feels better today  for dialysis later  sacral evaluation for possible intervention    5/4 MK  - ESRD on hd: tolerating uf with hd   - Afib on AC: cardizem and titrate as needed  - Anemia: epo   - PNA with mucus plug and possible endobronchial lesion, with rising leukocytosis await timing of bronch  - rising leukocytosis with hx of cdiff: no diarrhea   LM with dr bush   bronch to be done by CTS, Dr malave, if ffp needed, will do hd with ffp in am followed by bronch ...    5/7 SY  --RML opacity : post Bronchoscopy .  Positive for endobronchial lesion.   no biopsy done.   Follow up Cytology/Cultures.  Further options to be discussed with family.  --ESRD : for HD in am.  --A FIB : Continue Cardizem for rate control and to restart a/c,  --Leukocytosis : Increasing  : Post Bronch.  Continue abtx.    5/8 SY  --ESRD : HD order and tx reviewed.  Tolerating tx well.  --Pulmonary : Positive Endobronchial lesion.  No further intervention at present and follow up as outpt,  --A FIB : continue Cardizem.  Restarted on A/C.  --Leukocytosis ;  Slightly improved. Off all abtx since 5/2.  Remains afebrile.    5/9  ESRD  on HD   For out pt PET scan, endobronchial lesion   HD am    5/10 MK   - ESRD tolerating hd   - MBD: dc sevelamer, cont hectrol   - + endobronchial lesion: as per pul/cts await onc input.    - stable leukocytosis, off abx     5/12  Dr Mcneill input noted  for outpt staging  treatment options as per pts eligibility and staging results     5/13 SY  --ESRD : HD order and tx reviewed.   BP decreased with UF.  Aim for 2 kg fluid removal today.  Pt will need HD x 3 daily due to Gadolinium adm today.  --Endobronchial lesion : for Outpt PET scan to further assess.  --A FIB : Monitor rate control.

## 2019-05-13 NOTE — PROGRESS NOTE ADULT - ATTENDING COMMENTS
As above, treatment plan formulated on rounds.
As above, treatment plan formulated on rounds.
Patient seen and examined with Family Medicine Residents Avinash Le and Asaf Srivastava on the Family Medicine Teaching Service.  Agree with history, physical, labs and plan which were reviewed in detail after a face to face encounter with the patient.
Patient seen and examined with Family Medicine Residents Avinash Le, Asaf Srivastava and Josie Garcia on the Family Medicine Teaching Service.  Agree with history, physical, labs and plan which were reviewed in detail after a face to face encounter with the patient.
Patient seen and examined with Family Medicine Residents Avinash Walden, Maik Le, and Josie Garcia on the Family Medicine Teaching Service.  Agree with history, physical, labs and plan which were reviewed in detail after a face to face encounter with the patient.
on exam- comfortable   -rs-aeeb, cta  -p/a-soft, bs+  -cvs-s1s2 normal     #Ct supportive care, discussed with pt and all questions have been answered     #Discussed with Dr. Zamora
on exam- comfortable   -rs-aeeb, cta  -p/a-soft, bs+  -cvs-s1s2 normal     #no need for bridging as CHADS2 <5     #monitor h/h closely and transfuse accordingly. pt had normal bowel movement and not bloody in ESRD area(as per HD nurse)    #Discussed in detail to pt and all questions have been answered. Discussed with Dr. Zamora
As above, treatment plan formulated on rounds.
Patient seen and examined with Family Medicine Residents Avinash Langford and Josie Garcia on the Family Medicine Teaching Service.  Agree with history, physical, labs and plan which were reviewed in detail after a face to face encounter with the patient.
Patient seen and examined with Family Medicine Residents Avinash Raymond, Luisa Peterson, and Karin Cates on the Family Medicine Teaching Service.  Agree with history, physical, labs and plan which were reviewed in detail after a face to face encounter with the patient.
Patient seen and examined with Family Medicine Residents Avinash Raymond, Maik Le, and NP student Herlinda Ratliff on the Family Medicine Teaching Service.  Agree with history, physical, labs and plan which were reviewed in detail after a face to face encounter with the patient.
on exam- comfortable   -rs-aeeb, cta  -p/a-soft, bs+  -cvs-s1s2 normal     #out of bed to chair today, discharge plan     #give small dose of vitamin k in view of elevated INR, mild hemoptysis and bruise skin     #ct to monitor pt closely
on exam- comfortable   -rs-aeeb, cta  -p/a-soft, bs+  -cvs-s1s2 normal    #ct supportive care, ct abx,     #discussed with Dr. Zamora    #transfer out of SD to med surg, discharge plan. Discussed with pt and all questions have been answered
As above, treatment plan formulated on rounds.

## 2019-05-13 NOTE — PROGRESS NOTE ADULT - ASSESSMENT
83 y/o male with ESRD on HD (MWF), HTN, RA on MTX and steroids, R leg amputation, severe CDI and megacolon, Severe PAD s/p LLE endarterectomy 2 weeks ago with dr dugan presents with T 100 and DC from L groin.  Pt also c/o R arm pain/swelling where pt had fistulogram of the AC fistula on last admission.  RUL dopplers negative for DVT.  L groin sono--negative for pseudo aneurysm and + hematoma.  Pt given 2.1 L IVF, IV vanco/aztreonam in ED    TTE (10/18)--mild-mod MR/EF 60%    Pt d/w daughter regarding advance directives--Pt is FULL RESUSCITATION (19 Apr 2019 16:12)  events noted while admitted muscle flap and plastic surgery care developed increased cough with possible bloody streaked sputuim / none present now and RN at bedside, both noticed thhick yellow sputum production  ct scan findings personally reveiwed / old films are not available on PACS at this time for comparison  Continued increase size of tubular oblong opacity in the right middle   lobe, possibly endobronchial mass with impacted airways. Irregular   opacity in the posterior left upper lobe, measuring 1.4 cm    Assessment / plan:  suspected pneumonia with mucus plugging RML  separate FERNANDO pulm nodule seen needs further eval  after antibiotic therapy  ESRD on dilaysis now  Sleep apnea, obstructive: Requires home O2 therapy, and treatment with BIPAP  bibasilar atelectasis with associated small effusions  Prior films reviewed with ct scan abd/pelvis done 7/2018 and 10/2018 with similar RLL findings NOW increased in size which raises suspicion for malignancy higher with this finding with possible endobronchial mass also present / all discussed with his DTR     Appreciate CTS recommendations   s/p BRONCH without bx done 5/6, tolerated well  POSITIVE ENDOBRONCHIAL LESION RML / 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      5/13  pt is awake and alert  Bronch findings along with Brushings being positive for NSC lung cancer discussed   Oncology consult and radiation oncology eval appreciated and discussed  no hemoptysis  radiation oncologist input also appreciated in view of Endobronchial mass RML    no hemoptysis  pos brushing for NSC lung ca, favor Sq cell ca  needs further eval for extent of dz  PET scan as outpt required as well as PFT  also additional nodular density FERNANDO, 1.4 cm in size - needs eval    no active sxs and no reported hemoptysis  pt is not a good surgical candidate at this time with infection being treated   ct abd / pelvis for further evaluation now  PET scan as outpt

## 2019-05-14 ENCOUNTER — TRANSCRIPTION ENCOUNTER (OUTPATIENT)
Age: 84
End: 2019-05-14

## 2019-05-14 DIAGNOSIS — T81.49XA INFECTION FOLLOWING A PROCEDURE, OTHER SURGICAL SITE, INITIAL ENCOUNTER: ICD-10-CM

## 2019-05-14 LAB
ADD ON TEST-SPECIMEN IN LAB: SIGNIFICANT CHANGE UP
ALBUMIN SERPL ELPH-MCNC: 1.9 G/DL — LOW (ref 3.3–5)
ANION GAP SERPL CALC-SCNC: 8 MMOL/L — SIGNIFICANT CHANGE UP (ref 5–17)
BUN SERPL-MCNC: 37 MG/DL — HIGH (ref 7–23)
CALCIUM SERPL-MCNC: 7.9 MG/DL — LOW (ref 8.5–10.1)
CALCIUM SERPL-MCNC: 8.4 MG/DL — SIGNIFICANT CHANGE UP (ref 8.4–10.5)
CHLORIDE SERPL-SCNC: 107 MMOL/L — SIGNIFICANT CHANGE UP (ref 96–108)
CO2 SERPL-SCNC: 25 MMOL/L — SIGNIFICANT CHANGE UP (ref 22–31)
CREAT SERPL-MCNC: 2.8 MG/DL — HIGH (ref 0.5–1.3)
FOLATE SERPL-MCNC: 2.2 NG/ML — LOW
GLUCOSE SERPL-MCNC: 119 MG/DL — HIGH (ref 70–99)
HCT VFR BLD CALC: 31.3 % — LOW (ref 39–50)
HGB BLD-MCNC: 9.5 G/DL — LOW (ref 13–17)
INR BLD: 2.95 RATIO — HIGH (ref 0.88–1.16)
MCHC RBC-ENTMCNC: 30.4 GM/DL — LOW (ref 32–36)
MCHC RBC-ENTMCNC: 32 PG — SIGNIFICANT CHANGE UP (ref 27–34)
MCV RBC AUTO: 105.4 FL — HIGH (ref 80–100)
PHOSPHATE SERPL-MCNC: 3.1 MG/DL — SIGNIFICANT CHANGE UP (ref 2.5–4.5)
PLATELET # BLD AUTO: 193 K/UL — SIGNIFICANT CHANGE UP (ref 150–400)
POTASSIUM SERPL-MCNC: 3.8 MMOL/L — SIGNIFICANT CHANGE UP (ref 3.5–5.3)
POTASSIUM SERPL-SCNC: 3.8 MMOL/L — SIGNIFICANT CHANGE UP (ref 3.5–5.3)
PROTHROM AB SERPL-ACNC: 33.8 SEC — HIGH (ref 10–12.9)
PTH-INTACT FLD-MCNC: 43 PG/ML — SIGNIFICANT CHANGE UP (ref 15–65)
RBC # BLD: 2.97 M/UL — LOW (ref 4.2–5.8)
RBC # FLD: 21.7 % — HIGH (ref 10.3–14.5)
SODIUM SERPL-SCNC: 140 MMOL/L — SIGNIFICANT CHANGE UP (ref 135–145)
VIT B12 SERPL-MCNC: 1165 PG/ML — SIGNIFICANT CHANGE UP (ref 232–1245)
WBC # BLD: 8.66 K/UL — SIGNIFICANT CHANGE UP (ref 3.8–10.5)
WBC # FLD AUTO: 8.66 K/UL — SIGNIFICANT CHANGE UP (ref 3.8–10.5)

## 2019-05-14 RX ORDER — GABAPENTIN 400 MG/1
1 CAPSULE ORAL
Qty: 0 | Refills: 0 | DISCHARGE
Start: 2019-05-14

## 2019-05-14 RX ORDER — COLLAGENASE CLOSTRIDIUM HIST. 250 UNIT/G
1 OINTMENT (GRAM) TOPICAL
Refills: 0 | Status: DISCONTINUED | OUTPATIENT
Start: 2019-05-14 | End: 2019-05-15

## 2019-05-14 RX ORDER — METHOTREXATE 2.5 MG/1
4 TABLET ORAL
Qty: 0 | Refills: 0 | DISCHARGE

## 2019-05-14 RX ORDER — ALBUTEROL 90 UG/1
2 AEROSOL, METERED ORAL
Qty: 0 | Refills: 0 | DISCHARGE
Start: 2019-05-14

## 2019-05-14 RX ORDER — WARFARIN SODIUM 2.5 MG/1
2 TABLET ORAL ONCE
Refills: 0 | Status: COMPLETED | OUTPATIENT
Start: 2019-05-14 | End: 2019-05-14

## 2019-05-14 RX ADMIN — Medication 1 APPLICATION(S): at 18:15

## 2019-05-14 RX ADMIN — OXYCODONE AND ACETAMINOPHEN 1 TABLET(S): 5; 325 TABLET ORAL at 21:53

## 2019-05-14 RX ADMIN — HEPARIN SODIUM 5000 UNIT(S): 5000 INJECTION INTRAVENOUS; SUBCUTANEOUS at 05:52

## 2019-05-14 RX ADMIN — FINASTERIDE 5 MILLIGRAM(S): 5 TABLET, FILM COATED ORAL at 16:01

## 2019-05-14 RX ADMIN — Medication 60 MILLIGRAM(S): at 16:01

## 2019-05-14 RX ADMIN — Medication 1 DROP(S): at 05:53

## 2019-05-14 RX ADMIN — PANTOPRAZOLE SODIUM 40 MILLIGRAM(S): 20 TABLET, DELAYED RELEASE ORAL at 05:53

## 2019-05-14 RX ADMIN — GABAPENTIN 300 MILLIGRAM(S): 400 CAPSULE ORAL at 16:00

## 2019-05-14 RX ADMIN — ATORVASTATIN CALCIUM 20 MILLIGRAM(S): 80 TABLET, FILM COATED ORAL at 21:54

## 2019-05-14 RX ADMIN — OXYCODONE AND ACETAMINOPHEN 1 TABLET(S): 5; 325 TABLET ORAL at 16:00

## 2019-05-14 RX ADMIN — Medication 650 MILLIGRAM(S): at 19:19

## 2019-05-14 RX ADMIN — Medication 1 APPLICATION(S): at 18:14

## 2019-05-14 RX ADMIN — Medication 650 MILLIGRAM(S): at 18:14

## 2019-05-14 RX ADMIN — Medication 100 MILLIGRAM(S): at 16:01

## 2019-05-14 RX ADMIN — WARFARIN SODIUM 2 MILLIGRAM(S): 2.5 TABLET ORAL at 21:54

## 2019-05-14 RX ADMIN — Medication 1 DROP(S): at 16:02

## 2019-05-14 RX ADMIN — OXYCODONE AND ACETAMINOPHEN 1 TABLET(S): 5; 325 TABLET ORAL at 08:13

## 2019-05-14 RX ADMIN — OXYCODONE AND ACETAMINOPHEN 1 TABLET(S): 5; 325 TABLET ORAL at 09:30

## 2019-05-14 RX ADMIN — OXYCODONE AND ACETAMINOPHEN 1 TABLET(S): 5; 325 TABLET ORAL at 17:00

## 2019-05-14 RX ADMIN — LIDOCAINE AND PRILOCAINE CREAM 1 APPLICATION(S): 25; 25 CREAM TOPICAL at 08:14

## 2019-05-14 RX ADMIN — Medication 1 DROP(S): at 21:54

## 2019-05-14 RX ADMIN — CHOLESTYRAMINE 4 GRAM(S): 4 POWDER, FOR SUSPENSION ORAL at 16:02

## 2019-05-14 RX ADMIN — OXYCODONE AND ACETAMINOPHEN 1 TABLET(S): 5; 325 TABLET ORAL at 22:50

## 2019-05-14 NOTE — DISCHARGE NOTE PROVIDER - PROVIDER TOKENS
PROVIDER:[TOKEN:[[id number]]],PROVIDER:[TOKEN:[[id number]]],PROVIDER:[TOKEN:[[id number]]],PROVIDER:[TOKEN:[[id number]]],PROVIDER:[TOKEN:[[id number]]]

## 2019-05-14 NOTE — PROGRESS NOTE ADULT - SUBJECTIVE AND OBJECTIVE BOX
Patient is a 84y old  Male who presents with a chief complaint of L groin DC (28 Apr 2019 17:14)      HPI:  85 y/o male with ESRD on HD (MWF), HTN, RA on MTX and steroids, R leg amputation, severe CDI and megacolon, Severe PAD s/p LLE endarterectomy 2 weeks ago with dr dugan presents with T 100 and DC from L groin.  Pt also c/o R arm pain/swelling where pt had fistulogram of the AC fistula on last admission.  RUL dopplers negative for DVT.  L groin sono--negative for pseudo aneurysm and + hematoma.  Pt given 2.1 L IVF, IV vanco/aztreonam in ED  On my exam in HD suite, awake, chronically ill appearing, NAD, daughter at bedside. Pt seen by Dr dugan in ED.  CXR--clear    TTE (10/18)--mild-mod MR/EF 60%    Pt d/w daughter regarding advance directives--Pt is FULL RESUSCITATION (19 Apr 2019 16:12)  events noted while admitted muscle flap and plastic surgery care developed increased cough with possible bloody streaked sputuim / none present now and RN at bedside, both noticed thhick yellow sputum production    4/30  data discussed with RN at bedside  no cp  improved breathing    5/1  feels the same  decreased cough  DTR is contacted and data discussed regarding abnormal ct scan findings  5/2  data discussed with pt's DTR and medical team as well  although BRONCH is appropriate given ct scan findings, it can not be done yet till INR corrected specially in pt with ESRD.  DATA DISCUSSED WITH DR JORGE MASSEY AS WELL for thoracic surgery consult  5/3  data discussed with thoracic surgery yesterday  discussed with dr Rashid today  pt is resting comfortably  no resp distress  no hemoptysis    5/4  No acute events occurred overnight  Discussed with hospitalist/resident team and daughter     5/5  INR still elevated   No acute pulmonary events occurred overnight  5/6 uneventful night  in dialysis  bronch postponed till INR corrected  5/7  s/p BRONCH without bx yesterday afternoon  tolerated well  POSITIVE ENDOBRONCHIAL LESION RML  no hemoptysis this am  feels ok  5/8  Bronch findings discussed with pt  plan rev with thoracic surgery with plan for fu imaging in 2-3 months  no active sxs and no reported hemoptysis  pt is not a good surgical candidate at this time  5/9  pt is resting comfortably  no hemoptysis  will need to discuss path data with family  pos brushing for NSC lung ca, favor Sq cell ca  5/10  pt is awake and alert  Bronch findings along with Brushings being positive for NSC lung cancer discussed with Dtr yesterday afternoon and with pt this am  all their questions are answered  appreciate thoracic surgery presenting his case at [facility] this am  no hemoptysis  radiation oncologist input would also be appreciated in view of Endobronchial mass RML  5/11  all recent data discussed with pt and his family at bedside today  questions answered  RML lesion/ mass as well as FERNANDO pulm nodule discussed  no hemoptysis  5/13  appreciate oncology and RT consult  data discussed with pt today  he is in agreement to [proceed with this approach  no hemoptysis  no difficulty breathing  5/14  sleeping comfortably  all recent data ct abd / pelvis and brain MRI reveiwed  MEDICATIONS  (STANDING):  allopurinol 100 milliGRAM(s) Oral <User Schedule>  artificial tears (preservative free) Ophthalmic Solution 1 Drop(s) Both EYES three times a day  atorvastatin 20 milliGRAM(s) Oral at bedtime  cholestyramine Powder (Sugar-Free) 4 Gram(s) Oral daily  diltiazem    Tablet 60 milliGRAM(s) Oral four times a day  doxercalciferol Injectable 1 MICROGram(s) IV Push <User Schedule>  epoetin nelly Injectable 48985 Unit(s) IV Push <User Schedule>  finasteride 5 milliGRAM(s) Oral daily  gabapentin 300 milliGRAM(s) Oral daily  hydrocortisone sodium succinate Injectable 100 milliGRAM(s) IV Push every 8 hours  lidocaine/prilocaine Cream 1 Application(s) Topical daily  pantoprazole    Tablet 40 milliGRAM(s) Oral before breakfast  predniSONE   Tablet 5 milliGRAM(s) Oral every other day  predniSONE   Tablet 2.5 milliGRAM(s) Oral every other day  sevelamer carbonate 800 milliGRAM(s) Oral three times a day with meals  silver sulfADIAZINE 1% Cream 1 Application(s) Topical daily    MEDICATIONS  (PRN):  acetaminophen   Tablet .. 650 milliGRAM(s) Oral every 6 hours PRN Temp greater or equal to 38.5C (101.3F)  acetaminophen   Tablet .. 650 milliGRAM(s) Oral every 6 hours PRN Mild Pain (1 - 3)  ALBUTerol    90 MICROgram(s) HFA Inhaler 2 Puff(s) Inhalation every 6 hours PRN Shortness of Breath and/or Wheezing  guaiFENesin   Syrup  (Sugar-Free) 100 milliGRAM(s) Oral every 6 hours PRN Cough  oxyCODONE    5 mG/acetaminophen 325 mG 1 Tablet(s) Oral every 4 hours PRN Moderate Pain (4 - 6)    Vital Signs Last 24 Hrs  T(C): 36.6 (14 May 2019 05:40), Max: 37.1 (13 May 2019 11:45)  T(F): 97.9 (14 May 2019 05:40), Max: 98.8 (13 May 2019 11:45)  HR: 70 (14 May 2019 05:40) (70 - 98)  BP: 118/30 (14 May 2019 05:40) (117/28 - 171/47)  BP(mean): --  RR: 18 (14 May 2019 05:40) (15 - 18)  SpO2: 99% (14 May 2019 05:40) (98% - 99%)  HEENT: PERRL, conjunctiva clear, EOM's intact, non injected pharynx, no exudate, TM   normal  Neck: Supple, , no adenopathy, thyroid: not enlarged, no carotid bruit or JVD  Back: Symmetric, no  tenderness,no soft tissue tenderness  Lungs: Clear to auscultation bilateral,no adventitious breath sounds, normal   expiratory phase  Heart: Regular rate and rhythm, S1, S2 normal, no murmur, rub or gallop  Abdomen: Soft, non-tender, bowel sounds active , no hepatosplenomegaly  Extremities: no cyanosis or edema, no joint swelling, hx AKA right side  Skin: Skin color, texture normal, no rashes   Neurologic: Alert and oriented X3 , cranial nerves intact, sensory and motor normal,    ECG:    LABS:                                 10.7   20.09 )-----------( 361      ( 06 May 2019 07:45 )             34.9   05-06    137  |  105  |  48<H>  ----------------------------<  90  4.3   |  23  |  4.78<H>    Ca    8.1<L>      06 May 2019 07:45  Phos  4.1     05-06    TPro  x   /  Alb  2.0<L>  /  TBili  x   /  DBili  x   /  AST  x   /  ALT  x   /  AlkPhos  x   05-06               9.8    16.81 )-----------( 443      ( 01 May 2019 06:35 )             32.1   PT/INR - ( 06 May 2019 07:45 )   PT: 19.4 sec;   INR: 1.72 ratio                                 9.9    13.16 )-----------( 358      ( 30 Apr 2019 06:08 )             32.1                           10.1   7.96  )-----------( 334      ( 29 Apr 2019 07:08 )             33.2     04-29    04-30    142  |  111<H>  |  19  ----------------------------<  87  3.8   |  24  |  2.70<H>    Ca    8.3<L>      30 Apr 2019 06:08  Phos  3.4     04-29    TPro  x   /  Alb  1.8<L>  /  TBili  x   /  DBili  x   /  AST  x   /  ALT  x   /  AlkPhos  x   04-29  138  |  108  |  35<H>  ----------------------------<  95  4.4   |  22  |  4.61<H>    Ca    8.0<L>      29 Apr 2019 07:08              PT/INR - ( 29 Apr 2019 07:08 )   PT: 39.9 sec;   INR: 3.46 ratio                   RADIOLOGY & ADDITIONAL STUDIES:  < from: CT Chest No Cont (04.28.19 @ 12:17) >  PROCEDURE:   CT of the Chest was performed without intravenous contrast.  Sagittal and coronal reformats were performed.    FINDINGS:    LUNGS AND LARGE AIRWAYS: Emphysema. Continued increase size of tubular   oblong opacity in the right middle lobe, possibly endobronchial mass with   impacted airways. Possible associated broncholiths. Irregular opacity in   the posterior left upper lobe (series 3, image 60), measuring 1.4 cm.  PLEURA: Small left pleural effusion. Trace right pleural effusion.  VESSELS: Atherosclerotic calcifications.   HEART: Heart size is normal. No pericardial effusion.  MEDIASTINUM AND [place]: No lymphadenopathy.  CHEST WALL AND LOWER NECK: Left thyroid nodule, measuring 2.9 cm.   Extension of the thyroid gland into the superior mediastinum.   VISUALIZED UPPER ABDOMEN: Cholelithiasis. Unchanged probable cyst in   hepatic segment 8.  BONES: Degenerative changes of the spine.    IMPRESSION:   Continued increase size of tubular oblong opacity in the right middle   lobe, possibly endobronchial mass with impacted airways. Irregular   opacity in the posterior left upper lobe, measuring 1.4 cm. PET/CT is   recommended for further evaluation.    Left thyroid nodule, measuring 2.9 cm. Further evaluation with thyroid   ultrasound is recommended.    < end of copied text >

## 2019-05-14 NOTE — DISCHARGE NOTE PROVIDER - HOSPITAL COURSE
85 y/o male with ESRD on hemodialysis (MWF), hypertension, rheumatoid arthritis on methotrexate and steroids, right leg amputation, severe CDI and megacolon, Severe peripheral artery disease who had a left lower extremity endarterectomy (4/1/19) with dr Clement presented with a temperature of 100.5 and discharges from the left groin. The patient had a duplex US that showed a hematoma of the left groin. The patient also complained of right arm pain and swelling where the patient had a fistulogram of the AC fistula on last admission. The patient was started on vancomycin given his history recurrent c. diff infection as well as aztreonam for the wound. The aztreonam was discontinued and the patient was started on daptomycin and cefepime for VRE bacteria (as noted by bood culture results) from the wound site and possible pneumonia found on chest xray. During the hospital course the patient was pancytopenic so the methotrexate was held and it resolved. Vascular surgery did debridement of the wound site as well as a muscle flap. While in the hospital the pt was coughing up blood which lead to a CT of the chest. This revealed an enlarging right middle lobe opacity, a new Left upper lobe nodule, and a thyroid nodule. The US of the thyroid recommend FNA outpatient. A PET scan was also recommended for the outpatient. The patient had a flexible bronchoscopy on 5/6. Pathology report yielded nonsmall cell carcinoma, favoring squamous cell. Pt was seen by Heme/onc and rad onc. CT Abdomen and pelvis and MRI of the brain was done to evaluate for metastasis which showed no evidence. Pt received extra dialysis for the contrast use. Pt is currently stable for discharge to rehab with follow up with Alissa Connor and [facility] for further work up and management of malignancy. 85 y/o male with ESRD on hemodialysis (MWF), hypertension, rheumatoid arthritis on methotrexate and steroids, right leg amputation, severe CDI and megacolon, Severe peripheral artery disease who had a left lower extremity endarterectomy (4/1/19) with dr Clement presented with a temperature of 100.5 and discharges from the left groin. The patient had a duplex US that showed a hematoma of the left groin. The patient also complained of right arm pain and swelling where the patient had a fistulogram of the AC fistula on last admission. The patient was started on vancomycin given his history recurrent c. diff infection as well as aztreonam for the wound. The aztreonam was discontinued and the patient was started on daptomycin and cefepime for VRE bacteria (as noted by bood culture results) from the wound site and possible pneumonia found on chest xray. During the hospital course the patient was pancytopenic so the methotrexate was held and it resolved. Vascular surgery did debridement of the wound site as well as a muscle flap. While in the hospital the pt was coughing up blood which lead to a CT of the chest. This revealed an enlarging right middle lobe opacity, a new Left upper lobe nodule, and a thyroid nodule. The US of the thyroid recommend FNA outpatient. A PET scan was also recommended for the outpatient. The patient had a flexible bronchoscopy on 5/6. Pathology report yielded nonsmall cell carcinoma, favoring squamous cell. Pt was seen by Heme/onc and rad onc. CT Abdomen and pelvis and MRI of the brain was done to evaluate for metastasis which showed no evidence. Pt received extra dialysis for the contrast use. Pt is currently stable for discharge to rehab with follow up with Alissa Connor and [facility] for further work up and management of malignancy.                 < from: CT Chest No Cont (04.28.19 @ 12:17) >        IMPRESSION:     Continued increase size of tubular oblong opacity in the right middle     lobe, possibly endobronchial mass with impacted airways. Irregular     opacity in the posterior left upper lobe, measuring 1.4 cm. PET/CT is     recommended for further evaluation.        Left thyroid nodule, measuring 2.9 cm. Further evaluation with thyroid     ultrasound is recommended.            < from: MR Head w/wo IV Cont Disc (05.13.19 @ 11:59) >        IMPRESSION:  Mild to moderate periventricular white matter ischemia. Tiny     old lacunar infarctions are seen in the RIGHT caudate nucleus and     BILATERAL external capsules.  Small retention cysts in the BILATERAL     maxillary sinuses. Mild BILATERAL mastoiditis.            < from: CT Abdomen and Pelvis w/ IV Cont (05.13.19 @ 10:37) >        IMPRESSION: No evidence of metastatic disease.

## 2019-05-14 NOTE — PROGRESS NOTE ADULT - SUBJECTIVE AND OBJECTIVE BOX
NEPHROLOGY INTERVAL HPI/OVERNIGHT EVENTS:    Date of Service: 05-13-19 @ 15:28    5/14  MR head w maninder and CT abd/ pelvis w IVC noted   no evidence of mets  Pt on extra dialysis sessions due to Maninder administration  tolerating well    5/13 SY  No acute events overnight.  Reports feeling fairly well.    5/12  feels well  Dr Mcneill input noted   for outpt staging of bronchial lesion    05-10-19 @ 10:13  seen at HD  pulmonary and CTS input noted.    no new c/o appetite fair, no sob, cough improved  no diarrhea    MEDICATIONS  (STANDING):  allopurinol 100 milliGRAM(s) Oral <User Schedule>  artificial  tears Solution 1 Drop(s) Both EYES three times a day  atorvastatin 20 milliGRAM(s) Oral at bedtime  cholestyramine Powder (Sugar-Free) 4 Gram(s) Oral daily  diltiazem    Tablet 60 milliGRAM(s) Oral four times a day  doxercalciferol Injectable 1 MICROGram(s) IV Push <User Schedule>  epoetin nelly Injectable 85159 Unit(s) IV Push <User Schedule>  finasteride 5 milliGRAM(s) Oral daily  gabapentin 300 milliGRAM(s) Oral daily  heparin  Injectable 5000 Unit(s) SubCutaneous every 8 hours  lidocaine/prilocaine Cream 1 Application(s) Topical daily  pantoprazole    Tablet 40 milliGRAM(s) Oral before breakfast  predniSONE   Tablet 5 milliGRAM(s) Oral every other day  predniSONE   Tablet 2.5 milliGRAM(s) Oral every other day  silver sulfADIAZINE 1% Cream 1 Application(s) Topical daily    MEDICATIONS  (PRN):  acetaminophen   Tablet .. 650 milliGRAM(s) Oral every 6 hours PRN Mild Pain (1 - 3)  ALBUTerol    90 MICROgram(s) HFA Inhaler 2 Puff(s) Inhalation every 6 hours PRN Shortness of Breath and/or Wheezing  guaiFENesin   Syrup  (Sugar-Free) 100 milliGRAM(s) Oral every 6 hours PRN Cough  oxyCODONE    5 mG/acetaminophen 325 mG 1 Tablet(s) Oral every 4 hours PRN Moderate Pain (4 - 6)    Vital Signs Last 24 Hrs  T(C): 36.6 (14 May 2019 05:40), Max: 37.1 (13 May 2019 11:45)  T(F): 97.9 (14 May 2019 05:40), Max: 98.8 (13 May 2019 11:45)  HR: 70 (14 May 2019 05:40) (70 - 98)  BP: 118/30 (14 May 2019 05:40) (117/28 - 171/47)  BP(mean): --  RR: 18 (14 May 2019 05:40) (15 - 18)  SpO2: 99% (14 May 2019 05:40) (98% - 99%)    I&O's Detail    PHYSICAL EXAM:  Alert and appropriate  GENERAL: no distress  CHEST/LUNG: Right base rhonchi  HEART: S1S2 Irreg.  ABDOMEN: soft  EXTREMITIES: positive edema  SKIN:     LABS:                                   9.3    11.87 )-----------( 210      ( 13 May 2019 12:15 )             30.4       05-13    139  |  107  |  74<H>  ----------------------------<  96  4.9   |  22  |  4.65<H>    Ca    7.5<L>      13 May 2019 12:15  Phos  3.5     05-13    TPro  x   /  Alb  1.8<L>  /  TBili  x   /  DBili  x   /  AST  x   /  ALT  x   /  AlkPhos  x   05-13

## 2019-05-14 NOTE — PROGRESS NOTE ADULT - SUBJECTIVE AND OBJECTIVE BOX
Pt has been seen and examined with FP resident, resident supervised agree with a/p       Patient is a 84y old  Male who presents with a chief complaint of L groin DC (14 May 2019 16:08)      PHYSICAL EXAM:  Vital Signs Last 24 Hrs  T(C): 36.1 (14 May 2019 13:18), Max: 37 (13 May 2019 20:12)  T(F): 97 (14 May 2019 13:18), Max: 98.6 (13 May 2019 20:12)  HR: 93 (14 May 2019 13:18) (67 - 93)  BP: 162/47 (14 May 2019 13:18) (117/28 - 178/53)  BP(mean): --  RR: 16 (14 May 2019 13:18) (16 - 18)  SpO2: 99% (14 May 2019 05:40) (98% - 99%)  general- comfortable   -rs-aeeb,cta  -cvs-s1s2 normal   -p/a-soft,bs+        A/P    #d/c today, time spent 60 minutes

## 2019-05-14 NOTE — DISCHARGE NOTE PROVIDER - CARE PROVIDER_API CALL
Cherise Pierre)  Thoracic and Cardiac Surgery  [address] [place] [address]  Phone: [phone]  Fax: [phone]  Follow Up Time:     OPAL Maxwell ()  Pulmonary Disease; Sleep Medicine  [address] [place] [address]  Phone: [phone]  Fax: [phone]  Follow Up Time:     Eric Mcneill)  Radiation Oncology  [address] [place] [address]  Phone: [phone]  Fax: [phone]  Follow Up Time:     Cherise Correia)  Internal Medicine  [address]  [place] [address]  Phone: [phone]  Fax: [phone]  Follow Up Time:     Jason Clement)  Vascular Surgery  [address] [facility], [place] [address]  Phone: [phone]  Fax: [phone]  Follow Up Time:

## 2019-05-14 NOTE — PHARMACOTHERAPY INTERVENTION NOTE - COMMENTS
recommended discontinuing heparin subQ as INR is therapeutic
Recommended changing aztreonam to cefepime. Pt was previously treated with and tolerated cefepime despite zosyn allergy (rash).
Recommended vanco level draw prior to dialysis

## 2019-05-14 NOTE — PROGRESS NOTE ADULT - ASSESSMENT
83 y/o male with ESRD on HD (MWF), HTN, RA on MTX and steroids, R leg amputation, severe CDI and megacolon, Severe PAD s/p LLE endarterectomy (4/1/19) with dr Clement presented with fever (100.5) and DC from L groin. Pt was noted to have lung mass increased in size.       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   - Will need PET outpatient  - s/p bronchoscopy 5/6  - Repeat imaging in 2-3 months  - CT Chest scan noted R endobronchial mass enlarging from previous CT and new L lung mass  - CT A/P: No evidence of mets  - MRI brain reviewed  - Pulm, CT surgery, heme/onc, Rad/onc consult appreciated  - D/C planning with outpatient workup for mets. D/C pending bed availability at rehab.    #Supratherapeutic INR  - Resolved  - Resume Coumadin at 2mg  - Daily INR and adjust dose to maintain therapeutic range    #Surgical site infection s/p endarterectomy 4/1   - Continue daily dressing as recommended by plastic surgery and wound care team.   - Continue pain control with Tylenol PRN, tramadol PRN and percocet PRN.   - ID, Vascular and Plastic surgery consult appreciated    #Thyroid Nodule suspicion for malignancy  - Nodule noted on US  - Repeat US in 6 months outpatient. 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 diff diarrhea with megacolon  - Monitor off abx  - ID recs appreciated    #PAF    - CHADS2 score of 3 (Age, CHF, HTN)  - Resume Coumadin  - Daily INR    #ESRD on HD M,W,F  - continue dialysis  - Extra HD today for contrast use  - Nephro recs appreciated.     #Chronic diastolic CHF -compensated  - Last echo 2018 EF of 60-65%.     #PVD  - s/p R AKA    #Prophylactic Measure  - Resume coumadin

## 2019-05-14 NOTE — PROGRESS NOTE ADULT - ASSESSMENT
83 y/o male with ESRD on HD (MWF), HTN, RA on MTX and steroids, R leg amputation, severe CDI and megacolon, Severe PAD s/p LLE endarterectomy 2 weeks ago with dr dugan presents with T 100 and DC from L groin.  Pt also c/o R arm pain/swelling where pt had fistulogram of the AC fistula on last admission.  RUL dopplers negative for DVT.  L groin sono--negative for pseudo aneurysm and + hematoma.  Pt given 2.1 L IVF, IV vanco/aztreonam in ED  On my exam in HD suite, awake, chronically ill appearing, NAD, daughter at bedside. Pt seen by Dr dugan in ED.  CXR--clear    4/20  s/p hd yesterday  prolonged bleed from access stopped w pressure  feels well  arm less tender  received 1 u prbc on hd yesterday  monitor cbc    4/22 SY  --ESRD : HD order and tx reviewed.   Tolerating tx well.  --AVF : as above.  Prolonged bleeding post tx.  Venous pressure acceptable today.  Monitor AVF function.  --Anemia : with acute loss.  Active infection leading to LETICIA hyporesponsiveness.  Transfuse 2 units today.  --PAD : post Left Ileofemoral Endarterectomy : Monitor Left foot perfusion.  --ID ; Left Groin infection : Continue abtx.  For debridement in am.    4/23  as above  For HD Wednesday  groin wash out today    4/24 SY  --ESRD : HD order and tx reviewed.  Tolerating tx well.  AVF cannulated without difficulty.  --Left Groin wound --Post  Muscle Flap Coverage.  Continue abtx.  --PAD : monitor perfusion.  --ID : continue Cefepime.    4/25 SY  --ESRD : Continue TIW HD  --AVF : cannulated without difficulty.  RUE edema much improved.  --Left Groin wound : post Muscle Flap Coverage.   Continue abtx.  --ID: continue abtx.  --PAD : post Left Ileofemoral endarterectomy : monitor perfusion.    4/26  seen on hd  in good spirits   stable on hd  fluid removal reduced due to low BP  hgb stable    4/27 SY  --ESRD : Continue TIW HD  --AVF : functioning well.  --PAD : Post Left Ileofemoral Endarterectomy : monitor perfusion  --Left Groin Wound : post Muscle Flap Coverage : continue abtx and wound care.    4/28 SY  --ESRD : for HD in am  --AVF : functioning well with improved cannulation.  --PAD : monitor perfusion ( Post Left Ileofemoral Endarterectomy)  --Left Groin wound : post Muscle Flap : continue abtx and wound care.    4/29 MK   - ESRD: tolerting hd, AVF cannultion well  - inc sputum production with possible endobronchial mass: dr bush input noted, may need bronch  - AMS with more confusion and jerking motion: pain meds have been limited, will get ammonia and abg value, has not been using bipap during hospitalization  - [place]: fu h/h   - left groin wound s/p muscle flap coverage with s/p left ileofemoral endarterectomy      4/30 SY  --ESRD : Continue TIW HD  --Pulm : Hemoptysis resolved.  New RML PNA and FERNANDO nodule   improved resp status . Now PNA with mucus plugging and new findings of lesion.  For outpt work up once clinically improved.  --AMS ;resolved and at baseline.  Attempt to minimize pain meds.  --PAD : Monitor Left groin and LE wound.  --ID : continue Cefepime and Daptomycin with po vanco for C diff prophylaxis.    5/1 SY  --ESRD :HD order and tx reviewed.    --Pulm : New RML PNA and FERNANDO nodule.  Pulmonary follow up appreciated.  Continue ABTX.  --AMS : resolved and stable.  --Increasing Leukocytosis : continue abtx.  D/w Dr Flanagan.  --PAD : LLE pain improved. (post Left Ileofemoral endarterectomy last admission.    5/2  Some av access arm pain last hd improved w repositioning  stable vitals  pulmonary/ thoracic surgery input noted  d/w Pt daughter    5/3  arm feels better today  for dialysis later  sacral evaluation for possible intervention    5/4 MK  - ESRD on hd: tolerating uf with hd   - Afib on AC: cardizem and titrate as needed  - Anemia: epo   - PNA with mucus plug and possible endobronchial lesion, with rising leukocytosis await timing of bronch  - rising leukocytosis with hx of cdiff: no diarrhea   LM with dr bush   bronch to be done by CTS, Dr malave, if ffp needed, will do hd with ffp in am followed by bronch ...    5/7 SY  --RML opacity : post Bronchoscopy .  Positive for endobronchial lesion.   no biopsy done.   Follow up Cytology/Cultures.  Further options to be discussed with family.  --ESRD : for HD in am.  --A FIB : Continue Cardizem for rate control and to restart a/c,  --Leukocytosis : Increasing  : Post Bronch.  Continue abtx.    5/8 SY  --ESRD : HD order and tx reviewed.  Tolerating tx well.  --Pulmonary : Positive Endobronchial lesion.  No further intervention at present and follow up as outpt,  --A FIB : continue Cardizem.  Restarted on A/C.  --Leukocytosis ;  Slightly improved. Off all abtx since 5/2.  Remains afebrile.    5/9  ESRD  on HD   For out pt PET scan, endobronchial lesion   HD am    5/10 MK   - ESRD tolerating hd   - MBD: dc sevelamer, cont hectrol   - + endobronchial lesion: as per pul/cts await onc input.    - stable leukocytosis, off abx     5/12  Dr Mcneill input noted  for outpt staging  treatment options as per pts eligibility and staging results     5/13 SY  --ESRD : HD order and tx reviewed.   BP decreased with UF.  Aim for 2 kg fluid removal today.  Pt will need HD x 3 daily due to Gadolinium adm today.  --Endobronchial lesion : for Outpt PET scan to further assess.  --A FIB : Monitor rate control.    5/14  Seen on HD,   s/p maninder administration  CT abd / pelvis and MR head neg for mets  extra HD sessions as outlined

## 2019-05-14 NOTE — PROGRESS NOTE ADULT - ASSESSMENT
85 y/o male with ESRD on HD (MWF), HTN, RA on MTX and steroids, R leg amputation, severe CDI and megacolon, Severe PAD s/p LLE endarterectomy 2 weeks ago with dr dugan presents with T 100 and DC from L groin.  Pt also c/o R arm pain/swelling where pt had fistulogram of the AC fistula on last admission.  RUL dopplers negative for DVT.  L groin sono--negative for pseudo aneurysm and + hematoma.  Pt given 2.1 L IVF, IV vanco/aztreonam in ED    TTE (10/18)--mild-mod MR/EF 60%    Pt d/w daughter regarding advance directives--Pt is FULL RESUSCITATION (19 Apr 2019 16:12)  events noted while admitted muscle flap and plastic surgery care developed increased cough with possible bloody streaked sputuim / none present now and RN at bedside, both noticed thhick yellow sputum production  ct scan findings personally reveiwed / old films are not available on PACS at this time for comparison  Continued increase size of tubular oblong opacity in the right middle   lobe, possibly endobronchial mass with impacted airways. Irregular   opacity in the posterior left upper lobe, measuring 1.4 cm    Assessment / plan:  suspected pneumonia with mucus plugging RML  separate FERNANDO pulm nodule seen needs further eval  after antibiotic therapy  ESRD on dilaysis now  Sleep apnea, obstructive: Requires home O2 therapy, and treatment with BIPAP  bibasilar atelectasis with associated small effusions  Prior films reviewed with ct scan abd/pelvis done 7/2018 and 10/2018 with similar RLL findings NOW increased in size which raises suspicion for malignancy higher with this finding with possible endobronchial mass also present / all discussed with his DTR     Appreciate CTS recommendations   s/p BRONCH without bx done 5/6, tolerated well  POSITIVE ENDOBRONCHIAL LESION RML / 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    Bronch findings along with Brushings being positive for NSC lung cancer discussed   Oncology consult and radiation oncology eval appreciated and discussed  no hemoptysis  radiation oncologist input also appreciated in view of Endobronchial mass RML    no hemoptysis  pos brushing for NSC lung ca, favor Sq cell ca  needs further eval for extent of dz  PET scan as outpt required as well as PFT  also additional nodular density FERNANDO, 1.4 cm in size - needs eval    no active sxs and no reported hemoptysis  pt is not a good surgical candidate at this time with infection being treated  PET scan as outpt  all recent data ct abd / pelvis and brain MRI reveiwed / no distant mets

## 2019-05-14 NOTE — DISCHARGE NOTE PROVIDER - NSDCCPCAREPLAN_GEN_ALL_CORE_FT
PRINCIPAL DISCHARGE DIAGNOSIS  Diagnosis: Non-small cell carcinoma of lung  Assessment and Plan of Treatment:   - You will need PET scan in a couple of weeks to a month as directed by Dr. Pierre.   - Follow up with Dr. Pierre, Dr. Mcneill, Dr. Maxwell for further management.      SECONDARY DISCHARGE DIAGNOSES  Diagnosis: Surgical site infection  Assessment and Plan of Treatment: - Apply collagenase santyl to wound.   - Apply wet to dry dressings.   - Follow up with Dr. Clement next week.    Diagnosis: Thyroid nodule  Assessment and Plan of Treatment:   - Nodule was found incidentally on US  - You will need a repeat US in 6 months outpatient. You might need FNA outpatient.   - Follow up with Dr. Correia next week.    Diagnosis: Incontinence associated dermatitis  Assessment and Plan of Treatment:   - Continue silvadene cream as needed    Diagnosis: Paroxysmal A-fib  Assessment and Plan of Treatment:   - Continue Warfarin 3mgs daily  - Continue cardizem    Diagnosis: ESRD on hemodialysis  Assessment and Plan of Treatment:   - continue dialysis Monday, Wednesday, Friday

## 2019-05-14 NOTE — PROGRESS NOTE ADULT - SUBJECTIVE AND OBJECTIVE BOX
Hospital Course: 83 y/o male with ESRD on hemodialysis (MWF), hypertension, rheumatoid arthritis, on methotrexate and steroids, right leg amputation, severe CDI and megacolon, Severe peripheral artery disease who had a left lower extremity endarterectomy (4/1/19)with dr Clement who presents with a temperature of 100.5 and discharge from the left groin. The patient had a duplex US that showed a hematoma of the left groin. The patient also complains of right arm pain and swelling where the patient had a fistulogram of the AC fistula on last admission. The patient was started on vancomycin given his history recurrent c. diff infection as well as aztreonam for the wound. The aztreonam was discontinued and the patient was started on daptomycin and cefepime for VRE bacteria from the wound site and possible pna found on chest xray. During the hospital course the patient was pancytopenic so the methotrexate was held and it resolved. Vascular surgery did debridement of the wound site as well as a muscle flap. While in the hospital the pt was coughing up blood which lead to a CT of the chest. This revealed an enlarging right middle lobe opacity, a new FERNANDO nodule, and a thyroid nodule. The US of the thyroid recommend FNA outpatient. A PET scan was also recommended for the outpatient. The patient had a flexible bronchoscopy on 5/6. Path report yielded nonsmall cell carcinoma, favoring squamous cell. Pt was seen by Heme/onc and rad onc. CT Abdomen and pelvis and MRI of the brain was done to evaluate for metastasis which showed no evidence.    5/14: Pt was seen and examined. No overnight events or complaints. Pt is having extra HD today d/t contrast use. Awaiting bed at rehab tomorrow. Pt is not on Bipap in the hospital. Pt is medically discharged. Pt will follow up with Arsen Pierre, Osito, Alissa for staging and further management of lung mass.   Daughter Anamika is aware/up to date of plan and demonstrates understanding.    REVIEW OF SYSTEMS:  CONSTITUTIONAL: No weakness, fevers or chills  EYES/ENT: No visual changes;  No vertigo or throat pain   NECK: No pain or stiffness  RESPIRATORY: No cough, wheezing, hemoptysis; No shortness of breath  CARDIOVASCULAR: No chest pain or palpitations  GASTROINTESTINAL: No abdominal or epigastric pain. No nausea, vomiting, or hematemesis; No diarrhea or constipation. No melena or hematochezia.  GENITOURINARY: No dysuria, frequency or hematuria  NEUROLOGICAL: No numbness or weakness  SKIN: Left ankle and thigh wound.   All other review of systems is negative unless indicated above    Vital Signs Last 24 Hrs  T(C): 36.1 (14 May 2019 13:18), Max: 37 (13 May 2019 20:12)  T(F): 97 (14 May 2019 13:18), Max: 98.6 (13 May 2019 20:12)  HR: 93 (14 May 2019 13:18) (67 - 93)  BP: 162/47 (14 May 2019 13:18) (117/28 - 178/53)  RR: 16 (14 May 2019 13:18) (16 - 18)  SpO2: 99% (14 May 2019 05:40) (98% - 99%)      PHYSICAL EXAM:  Constitutional: NAD, awake and alert, well-developed  HEENT: PERR, EOMI, Normal Hearing, MMM  Neck: Soft and supple, No LAD, No JVD. Right neck sutures  Respiratory: Diminished breath sounds on the right  Cardiovascular: S1 and S2, regular rate and rhythm, no Murmurs, gallops or rubs  Gastrointestinal: Bowel Sounds present, soft, nontender, nondistended, no guarding, no rebound  Extremities: No peripheral edema. R BKA, Left surgical site C/D/I. Left ankle wound not draining. IAD noted at buttocks.   Vascular: 2+ peripheral pulses  Neurological: A/O x 3, no focal deficits  Musculoskeletal: Normal strength  Skin: No rashes    MEDICATIONS:  MEDICATIONS  (STANDING):  allopurinol 100 milliGRAM(s) Oral <User Schedule>  artificial  tears Solution 1 Drop(s) Both EYES three times a day  atorvastatin 20 milliGRAM(s) Oral at bedtime  cholestyramine Powder (Sugar-Free) 4 Gram(s) Oral daily  collagenase Ointment 1 Application(s) Topical two times a day  diltiazem    Tablet 60 milliGRAM(s) Oral four times a day  doxercalciferol Injectable 1 MICROGram(s) IV Push <User Schedule>  epoetin nelly Injectable 19865 Unit(s) IV Push <User Schedule>  finasteride 5 milliGRAM(s) Oral daily  gabapentin 300 milliGRAM(s) Oral daily  lidocaine/prilocaine Cream 1 Application(s) Topical daily  pantoprazole    Tablet 40 milliGRAM(s) Oral before breakfast  predniSONE   Tablet 5 milliGRAM(s) Oral every other day  predniSONE   Tablet 2.5 milliGRAM(s) Oral every other day  silver sulfADIAZINE 1% Cream 1 Application(s) Topical daily  warfarin 2 milliGRAM(s) Oral once      LABS: All Labs Reviewed:                        9.5    8.66  )-----------( 193      ( 14 May 2019 09:45 )             31.3     05-14    140  |  107  |  37<H>  ----------------------------<  119<H>  3.8   |  25  |  2.80<H>    Ca    7.9<L>      14 May 2019 09:45  Phos  3.1     05-14    TPro  x   /  Alb  1.9<L>  /  TBili  x   /  DBili  x   /  AST  x   /  ALT  x   /  AlkPhos  x   05-14    PT/INR - ( 14 May 2019 09:45 )   PT: 33.8 sec;   INR: 2.95 ratio

## 2019-05-14 NOTE — PROGRESS NOTE ADULT - SUBJECTIVE AND OBJECTIVE BOX
Patient doing well. No complaints.  Afebrile VSS  L groin wound clean.  Medial skin necrosis.  Muscle flap sealing base of wound.  Only superficial local wound care needed.  Start collagenase santyl in addition to wed to dry dressings.

## 2019-05-14 NOTE — DISCHARGE NOTE PROVIDER - CARE PROVIDERS DIRECT ADDRESSES
,DirectAddress_Unknown,DirectAddress_Unknown,[email or web address],DirectAddress_Unknown,DirectAddress_Unknown

## 2019-05-15 ENCOUNTER — TRANSCRIPTION ENCOUNTER (OUTPATIENT)
Age: 84
End: 2019-05-15

## 2019-05-15 VITALS
TEMPERATURE: 98 F | RESPIRATION RATE: 18 BRPM | HEART RATE: 78 BPM | DIASTOLIC BLOOD PRESSURE: 51 MMHG | OXYGEN SATURATION: 99 % | SYSTOLIC BLOOD PRESSURE: 159 MMHG

## 2019-05-15 LAB
ALBUMIN SERPL ELPH-MCNC: 2 G/DL — LOW (ref 3.3–5)
ANION GAP SERPL CALC-SCNC: 6 MMOL/L — SIGNIFICANT CHANGE UP (ref 5–17)
BUN SERPL-MCNC: 32 MG/DL — HIGH (ref 7–23)
CALCIUM SERPL-MCNC: 7.9 MG/DL — LOW (ref 8.5–10.1)
CHLORIDE SERPL-SCNC: 109 MMOL/L — HIGH (ref 96–108)
CO2 SERPL-SCNC: 25 MMOL/L — SIGNIFICANT CHANGE UP (ref 22–31)
CREAT SERPL-MCNC: 2.72 MG/DL — HIGH (ref 0.5–1.3)
GLUCOSE SERPL-MCNC: 101 MG/DL — HIGH (ref 70–99)
HCT VFR BLD CALC: 32.7 % — LOW (ref 39–50)
HGB BLD-MCNC: 9.9 G/DL — LOW (ref 13–17)
INR BLD: 2.13 RATIO — HIGH (ref 0.88–1.16)
MCHC RBC-ENTMCNC: 30.3 GM/DL — LOW (ref 32–36)
MCHC RBC-ENTMCNC: 31.6 PG — SIGNIFICANT CHANGE UP (ref 27–34)
MCV RBC AUTO: 104.5 FL — HIGH (ref 80–100)
PHOSPHATE SERPL-MCNC: 3.5 MG/DL — SIGNIFICANT CHANGE UP (ref 2.5–4.5)
PLATELET # BLD AUTO: 215 K/UL — SIGNIFICANT CHANGE UP (ref 150–400)
POTASSIUM SERPL-MCNC: 3.9 MMOL/L — SIGNIFICANT CHANGE UP (ref 3.5–5.3)
POTASSIUM SERPL-SCNC: 3.9 MMOL/L — SIGNIFICANT CHANGE UP (ref 3.5–5.3)
PROTHROM AB SERPL-ACNC: 24.2 SEC — HIGH (ref 10–12.9)
RBC # BLD: 3.13 M/UL — LOW (ref 4.2–5.8)
RBC # FLD: 21.2 % — HIGH (ref 10.3–14.5)
SODIUM SERPL-SCNC: 140 MMOL/L — SIGNIFICANT CHANGE UP (ref 135–145)
WBC # BLD: 9.18 K/UL — SIGNIFICANT CHANGE UP (ref 3.8–10.5)
WBC # FLD AUTO: 9.18 K/UL — SIGNIFICANT CHANGE UP (ref 3.8–10.5)

## 2019-05-15 RX ADMIN — Medication 5 MILLIGRAM(S): at 13:30

## 2019-05-15 RX ADMIN — ERYTHROPOIETIN 10000 UNIT(S): 10000 INJECTION, SOLUTION INTRAVENOUS; SUBCUTANEOUS at 10:20

## 2019-05-15 RX ADMIN — CHOLESTYRAMINE 4 GRAM(S): 4 POWDER, FOR SUSPENSION ORAL at 13:35

## 2019-05-15 RX ADMIN — Medication 60 MILLIGRAM(S): at 13:28

## 2019-05-15 RX ADMIN — LIDOCAINE AND PRILOCAINE CREAM 1 APPLICATION(S): 25; 25 CREAM TOPICAL at 06:20

## 2019-05-15 RX ADMIN — FINASTERIDE 5 MILLIGRAM(S): 5 TABLET, FILM COATED ORAL at 13:28

## 2019-05-15 RX ADMIN — Medication 1 DROP(S): at 13:35

## 2019-05-15 RX ADMIN — Medication 1 APPLICATION(S): at 06:23

## 2019-05-15 RX ADMIN — PANTOPRAZOLE SODIUM 40 MILLIGRAM(S): 20 TABLET, DELAYED RELEASE ORAL at 06:21

## 2019-05-15 RX ADMIN — OXYCODONE AND ACETAMINOPHEN 1 TABLET(S): 5; 325 TABLET ORAL at 12:26

## 2019-05-15 RX ADMIN — GABAPENTIN 300 MILLIGRAM(S): 400 CAPSULE ORAL at 13:29

## 2019-05-15 RX ADMIN — Medication 1 DROP(S): at 06:20

## 2019-05-15 RX ADMIN — Medication 1 APPLICATION(S): at 13:32

## 2019-05-15 NOTE — PROGRESS NOTE ADULT - SUBJECTIVE AND OBJECTIVE BOX
Pt has been seen and examined with FP resident, resident supervised agree with a/p       Patient is a 84y old  Male who presents with a chief complaint of L groin DC (15 May 2019 10:34)      PHYSICAL EXAM:  Vital Signs Last 24 Hrs  T(C): 36.6 (15 May 2019 13:06), Max: 37.2 (14 May 2019 22:00)  T(F): 97.9 (15 May 2019 13:06), Max: 98.9 (14 May 2019 22:00)  HR: 78 (15 May 2019 13:06) (65 - 89)  BP: 159/51 (15 May 2019 13:06) (99/29 - 170/41)  BP(mean): --  RR: 18 (15 May 2019 13:06) (16 - 18)  SpO2: 99% (15 May 2019 13:06) (96% - 100%)  general- comfortable   -rs-aeeb,cta  -cvs-s1s2 normal   -p/a-soft,bs+          A/P    #d/c today if no social issue, time spent 55 minutes     #discussed with pt and all questions have been answered

## 2019-05-15 NOTE — CHART NOTE - NSCHARTNOTESELECT_GEN_ALL_CORE
11/21/2018     Anni Baig  [address] [facility] [address]      Dear Anni Baig :    Since we were not able to see you for your postpartum exam we would encourage you to schedule an annual exam with a primary provider in Family Practice or OB/GYN as soon as possible.     If you have any questions please call us at [phone]. Thank you.    Sincerely,        Christine RODRIGUEZ / Alison Pires CNM      
Event Note
Dietitian F/U
Event Note
Medicine

## 2019-05-15 NOTE — PROGRESS NOTE ADULT - REASON FOR ADMISSION
NICHOLE gleason DC
NICHOLE gelason DC
NICHOLE gleason DC

## 2019-05-15 NOTE — PROGRESS NOTE ADULT - ASSESSMENT
85 y/o male with ESRD on HD (MWF), HTN, RA on MTX and steroids, R leg amputation, severe CDI and megacolon, Severe PAD s/p LLE endarterectomy (4/1/19) with dr Clement presented with fever (100.5) and DC from L groin. Pt was noted to have lung mass increased in size.       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   - Will need PET outpatient  - s/p bronchoscopy 5/6  - Repeat imaging in 2-3 months  - CT Chest scan noted R endobronchial mass enlarging from previous CT and new L lung mass  - CT A/P: No evidence of mets  - MRI brain reviewed  - Pulm, CT surgery, heme/onc, Rad/onc consult appreciated  - D/C planning to day to [facility] with outpatient workup for mets.    #Surgical site infection s/p endarterectomy 4/1   - Continue daily dressing as recommended by plastic surgery and wound care team.   - Continue pain control with Tylenol PRN, tramadol PRN and percocet PRN.   - ID, Vascular and Plastic surgery consult appreciated    #Thyroid Nodule suspicion for malignancy  - Nodule noted on US  - Repeat US in 6 months outpatient. 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 diff diarrhea with megacolon  - Monitor off abx  - ID recs appreciated    #PAF    - CHADS2 score of 3 (Age, CHF, HTN)  - Continue Coumadin  - Daily INR    #ESRD on HD M,W,F  - continue dialysis  - Nephro recs appreciated.     #Chronic diastolic CHF -compensated  - Last echo 2018 EF of 60-65%.     #PVD  - s/p R AKA    #Prophylactic Measure  - Continue coumadin

## 2019-05-15 NOTE — PROGRESS NOTE ADULT - ASSESSMENT
83 y/o male with ESRD on HD (MWF), HTN, RA on MTX and steroids, R leg amputation, severe CDI and megacolon, Severe PAD s/p LLE endarterectomy 2 weeks ago with dr dugan presents with T 100 and DC from L groin.  Pt also c/o R arm pain/swelling where pt had fistulogram of the AC fistula on last admission.  RUL dopplers negative for DVT.  L groin sono--negative for pseudo aneurysm and + hematoma.  Pt given 2.1 L IVF, IV vanco/aztreonam in ED  On my exam in HD suite, awake, chronically ill appearing, NAD, daughter at bedside. Pt seen by Dr dugan in ED.  CXR--clear    4/20  s/p hd yesterday  prolonged bleed from access stopped w pressure  feels well  arm less tender  received 1 u prbc on hd yesterday  monitor cbc    4/22 SY  --ESRD : HD order and tx reviewed.   Tolerating tx well.  --AVF : as above.  Prolonged bleeding post tx.  Venous pressure acceptable today.  Monitor AVF function.  --Anemia : with acute loss.  Active infection leading to LETICIA hyporesponsiveness.  Transfuse 2 units today.  --PAD : post Left Ileofemoral Endarterectomy : Monitor Left foot perfusion.  --ID ; Left Groin infection : Continue abtx.  For debridement in am.    4/23  as above  For HD Wednesday  groin wash out today    4/24 SY  --ESRD : HD order and tx reviewed.  Tolerating tx well.  AVF cannulated without difficulty.  --Left Groin wound --Post  Muscle Flap Coverage.  Continue abtx.  --PAD : monitor perfusion.  --ID : continue Cefepime.    4/25 SY  --ESRD : Continue TIW HD  --AVF : cannulated without difficulty.  RUE edema much improved.  --Left Groin wound : post Muscle Flap Coverage.   Continue abtx.  --ID: continue abtx.  --PAD : post Left Ileofemoral endarterectomy : monitor perfusion.    4/26  seen on hd  in good spirits   stable on hd  fluid removal reduced due to low BP  hgb stable    4/27 SY  --ESRD : Continue TIW HD  --AVF : functioning well.  --PAD : Post Left Ileofemoral Endarterectomy : monitor perfusion  --Left Groin Wound : post Muscle Flap Coverage : continue abtx and wound care.    4/28 SY  --ESRD : for HD in am  --AVF : functioning well with improved cannulation.  --PAD : monitor perfusion ( Post Left Ileofemoral Endarterectomy)  --Left Groin wound : post Muscle Flap : continue abtx and wound care.    4/29 MK   - ESRD: tolerting hd, AVF cannultion well  - inc sputum production with possible endobronchial mass: dr bush input noted, may need bronch  - AMS with more confusion and jerking motion: pain meds have been limited, will get ammonia and abg value, has not been using bipap during hospitalization  - [place]: fu h/h   - left groin wound s/p muscle flap coverage with s/p left ileofemoral endarterectomy      4/30 SY  --ESRD : Continue TIW HD  --Pulm : Hemoptysis resolved.  New RML PNA and FERNANDO nodule   improved resp status . Now PNA with mucus plugging and new findings of lesion.  For outpt work up once clinically improved.  --AMS ;resolved and at baseline.  Attempt to minimize pain meds.  --PAD : Monitor Left groin and LE wound.  --ID : continue Cefepime and Daptomycin with po vanco for C diff prophylaxis.    5/1 SY  --ESRD :HD order and tx reviewed.    --Pulm : New RML PNA and FERNANDO nodule.  Pulmonary follow up appreciated.  Continue ABTX.  --AMS : resolved and stable.  --Increasing Leukocytosis : continue abtx.  D/w Dr Flanagan.  --PAD : LLE pain improved. (post Left Ileofemoral endarterectomy last admission.    5/2  Some av access arm pain last hd improved w repositioning  stable vitals  pulmonary/ thoracic surgery input noted  d/w Pt daughter    5/3  arm feels better today  for dialysis later  sacral evaluation for possible intervention    5/4 MK  - ESRD on hd: tolerating uf with hd   - Afib on AC: cardizem and titrate as needed  - Anemia: epo   - PNA with mucus plug and possible endobronchial lesion, with rising leukocytosis await timing of bronch  - rising leukocytosis with hx of cdiff: no diarrhea   LM with dr bush   bronch to be done by CTS, Dr malave, if ffp needed, will do hd with ffp in am followed by bronch ...    5/7 SY  --RML opacity : post Bronchoscopy .  Positive for endobronchial lesion.   no biopsy done.   Follow up Cytology/Cultures.  Further options to be discussed with family.  --ESRD : for HD in am.  --A FIB : Continue Cardizem for rate control and to restart a/c,  --Leukocytosis : Increasing  : Post Bronch.  Continue abtx.    5/8 SY  --ESRD : HD order and tx reviewed.  Tolerating tx well.  --Pulmonary : Positive Endobronchial lesion.  No further intervention at present and follow up as outpt,  --A FIB : continue Cardizem.  Restarted on A/C.  --Leukocytosis ;  Slightly improved. Off all abtx since 5/2.  Remains afebrile.    5/9  ESRD  on HD   For out pt PET scan, endobronchial lesion   HD am    5/10 MK   - ESRD tolerating hd   - MBD: dc sevelamer, cont hectrol   - + endobronchial lesion: as per pul/cts await onc input.    - stable leukocytosis, off abx     5/12  Dr Mcneill input noted  for outpt staging  treatment options as per pts eligibility and staging results     5/13 SY  --ESRD : HD order and tx reviewed.   BP decreased with UF.  Aim for 2 kg fluid removal today.  Pt will need HD x 3 daily due to Gadolinium adm today.  --Endobronchial lesion : for Outpt PET scan to further assess.  --A FIB : Monitor rate control.    5/14  Seen on HD,   s/p maninder administration  CT abd / pelvis and MR head neg for mets  extra HD sessions as outlined    5/15 SY  --ESRD : 3rd HD post Gadolinium.  Tolerating tx well.  Aim for 1.5 kg UF with much improved fluid status.  --Endobronchial lesion: Follow up as outpt with PET scan to decide tx plan.  --A FIB : Rate controlled and stable.  --D/c planning.

## 2019-05-15 NOTE — PROGRESS NOTE ADULT - ASSESSMENT
85 y/o male with ESRD on HD (MWF), HTN, RA on MTX and steroids, R leg amputation, severe CDI and megacolon, Severe PAD s/p LLE endarterectomy 2 weeks ago with dr dugan presents with T 100 and DC from L groin.  Pt also c/o R arm pain/swelling where pt had fistulogram of the AC fistula on last admission.  RUL dopplers negative for DVT.  L groin sono--negative for pseudo aneurysm and + hematoma.  Pt given 2.1 L IVF, IV vanco/aztreonam in ED    TTE (10/18)--mild-mod MR/EF 60%    Pt d/w daughter regarding advance directives--Pt is FULL RESUSCITATION (19 Apr 2019 16:12)  events noted while admitted muscle flap and plastic surgery care developed increased cough with possible bloody streaked sputuim / none present now and RN at bedside, both noticed thhick yellow sputum production  ct scan findings personally reveiwed / old films are not available on PACS at this time for comparison  Continued increase size of tubular oblong opacity in the right middle   lobe, possibly endobronchial mass with impacted airways. Irregular   opacity in the posterior left upper lobe, measuring 1.4 cm    Assessment / plan:  suspected pneumonia with mucus plugging RML  separate FERNANDO pulm nodule seen needs further eval  after antibiotic therapy  ESRD on dilaysis now  Sleep apnea, obstructive: Requires home O2 therapy, and treatment with BIPAP  bibasilar atelectasis with associated small effusions  Prior films reviewed with ct scan abd/pelvis done 7/2018 and 10/2018 with similar RLL findings NOW increased in size which raises suspicion for malignancy higher with this finding with possible endobronchial mass also present / all discussed with his DTR     Appreciate CTS recommendations   s/p BRONCH without bx done 5/6, tolerated well  POSITIVE ENDOBRONCHIAL LESION RML / 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    Bronch findings along with Brushings being positive for NSC lung cancer discussed   Oncology consult and radiation oncology eval appreciated and discussed  no hemoptysis  radiation oncologist input also appreciated in view of Endobronchial mass RML    no hemoptysis  pos brushing for NSC lung ca, favor Sq cell ca  needs further eval for extent of dz  PET scan as outpt required as well as PFT  also additional nodular density FERNANDO, 1.4 cm in size - needs eval    no active sxs and no reported hemoptysis  pt is not a good surgical candidate at this time with infection being treated  PET scan as outpt  all recent data ct abd / pelvis and brain MRI reveiwed / no distant mets    5/15  pt is awake today and all his recent data discussed with him  he will make fu appt with me for PET scan as outpt

## 2019-05-15 NOTE — DISCHARGE NOTE NURSING/CASE MANAGEMENT/SOCIAL WORK - NSDCDPATPORTLINK_GEN_ALL_CORE
You can access the [facility] Patient Portal, offered by [facility], by registering with the following website: [email or web address]

## 2019-05-15 NOTE — PROGRESS NOTE ADULT - SUBJECTIVE AND OBJECTIVE BOX
Patient is a 84y old  Male who presents with a chief complaint of L groin DC (28 Apr 2019 17:14)      HPI:  85 y/o male with ESRD on HD (MWF), HTN, RA on MTX and steroids, R leg amputation, severe CDI and megacolon, Severe PAD s/p LLE endarterectomy 2 weeks ago with dr dugan presents with T 100 and DC from L groin.  Pt also c/o R arm pain/swelling where pt had fistulogram of the AC fistula on last admission.  RUL dopplers negative for DVT.  L groin sono--negative for pseudo aneurysm and + hematoma.  Pt given 2.1 L IVF, IV vanco/aztreonam in ED  On my exam in HD suite, awake, chronically ill appearing, NAD, daughter at bedside. Pt seen by Dr dugan in ED.  CXR--clear    TTE (10/18)--mild-mod MR/EF 60%    Pt d/w daughter regarding advance directives--Pt is FULL RESUSCITATION (19 Apr 2019 16:12)  events noted while admitted muscle flap and plastic surgery care developed increased cough with possible bloody streaked sputuim / none present now and RN at bedside, both noticed thhick yellow sputum production    4/30  data discussed with RN at bedside  no cp  improved breathing    5/1  feels the same  decreased cough  DTR is contacted and data discussed regarding abnormal ct scan findings  5/2  data discussed with pt's DTR and medical team as well  although BRONCH is appropriate given ct scan findings, it can not be done yet till INR corrected specially in pt with ESRD.  DATA DISCUSSED WITH DR JORGE MASSEY AS WELL for thoracic surgery consult  5/3  data discussed with thoracic surgery yesterday  discussed with dr Rashid today  pt is resting comfortably  no resp distress  no hemoptysis    5/4  No acute events occurred overnight  Discussed with hospitalist/resident team and daughter     5/5  INR still elevated   No acute pulmonary events occurred overnight  5/6 uneventful night  in dialysis  bronch postponed till INR corrected  5/7  s/p BRONCH without bx yesterday afternoon  tolerated well  POSITIVE ENDOBRONCHIAL LESION RML  no hemoptysis this am  feels ok  5/8  Bronch findings discussed with pt  plan rev with thoracic surgery with plan for fu imaging in 2-3 months  no active sxs and no reported hemoptysis  pt is not a good surgical candidate at this time  5/9  pt is resting comfortably  no hemoptysis  will need to discuss path data with family  pos brushing for NSC lung ca, favor Sq cell ca  5/10  pt is awake and alert  Bronch findings along with Brushings being positive for NSC lung cancer discussed with Dtr yesterday afternoon and with pt this am  all their questions are answered  appreciate thoracic surgery presenting his case at [facility] this am  no hemoptysis  radiation oncologist input would also be appreciated in view of Endobronchial mass RML  5/11  all recent data discussed with pt and his family at bedside today  questions answered  RML lesion/ mass as well as FERNANDO pulm nodule discussed  no hemoptysis  5/13  appreciate oncology and RT consult  data discussed with pt today  he is in agreement to [proceed with this approach  no hemoptysis  no difficulty breathing  5/14  sleeping comfortably  all recent data ct abd / pelvis and brain MRI reveiwed  5/15  pt is awake today and all his recent data discussed with him  he will make fu appt with me for PET scan as outpt  MEDICATIONS  (STANDING):  allopurinol 100 milliGRAM(s) Oral <User Schedule>  artificial  tears Solution 1 Drop(s) Both EYES three times a day  atorvastatin 20 milliGRAM(s) Oral at bedtime  cholestyramine Powder (Sugar-Free) 4 Gram(s) Oral daily  collagenase Ointment 1 Application(s) Topical two times a day  diltiazem    Tablet 60 milliGRAM(s) Oral four times a day  doxercalciferol Injectable 1 MICROGram(s) IV Push <User Schedule>  epoetin nelly Injectable 65082 Unit(s) IV Push <User Schedule>  finasteride 5 milliGRAM(s) Oral daily  gabapentin 300 milliGRAM(s) Oral daily  lidocaine/prilocaine Cream 1 Application(s) Topical daily  pantoprazole    Tablet 40 milliGRAM(s) Oral before breakfast  predniSONE   Tablet 5 milliGRAM(s) Oral every other day  predniSONE   Tablet 2.5 milliGRAM(s) Oral every other day  silver sulfADIAZINE 1% Cream 1 Application(s) Topical daily    MEDICATIONS  (PRN):  acetaminophen   Tablet .. 650 milliGRAM(s) Oral every 6 hours PRN Temp greater or equal to 38.5C (101.3F)  acetaminophen   Tablet .. 650 milliGRAM(s) Oral every 6 hours PRN Mild Pain (1 - 3)  ALBUTerol    90 MICROgram(s) HFA Inhaler 2 Puff(s) Inhalation every 6 hours PRN Shortness of Breath and/or Wheezing  guaiFENesin   Syrup  (Sugar-Free) 100 milliGRAM(s) Oral every 6 hours PRN Cough  oxyCODONE    5 mG/acetaminophen 325 mG 1 Tablet(s) Oral every 4 hours PRN Moderate Pain (4 - 6)    Vital Signs Last 24 Hrs  T(C): 36.6 (15 May 2019 06:11), Max: 37.2 (14 May 2019 22:00)  T(F): 97.9 (15 May 2019 06:11), Max: 98.9 (14 May 2019 22:00)  HR: 65 (15 May 2019 06:18) (65 - 93)  BP: 144/33 (15 May 2019 06:18) (99/29 - 178/53)  BP(mean): --  RR: 18 (15 May 2019 06:11) (16 - 18)  SpO2: 96% (15 May 2019 06:11) (96% - 100%)  HEENT: PERRL, conjunctiva clear, EOM's intact, non injected pharynx, no exudate, TM   normal  Neck: Supple, , no adenopathy, thyroid: not enlarged, no carotid bruit or JVD  Back: Symmetric, no  tenderness,no soft tissue tenderness  Lungs: Clear to auscultation bilateral,no adventitious breath sounds, normal   expiratory phase  Heart: Regular rate and rhythm, S1, S2 normal, no murmur, rub or gallop  Abdomen: Soft, non-tender, bowel sounds active , no hepatosplenomegaly  Extremities: no cyanosis or edema, no joint swelling, hx AKA right side  Skin: Skin color, texture normal, no rashes   Neurologic: Alert and oriented X3 , cranial nerves intact, sensory and motor normal,    ECG:    LABS:                          9.5    8.66  )-----------( 193      ( 14 May 2019 09:45 )             31.3   05-14    140  |  107  |  37<H>  ----------------------------<  119<H>  3.8   |  25  |  2.80<H>    Ca    7.9<L>      14 May 2019 09:45  Phos  3.1     05-14    TPro  x   /  Alb  1.9<L>  /  TBili  x   /  DBili  x   /  AST  x   /  ALT  x   /  AlkPhos  x   05-14                                   10.7   20.09 )-----------( 361      ( 06 May 2019 07:45 )             34.9   05-06    137  |  105  |  48<H>  ----------------------------<  90  4.3   |  23  |  4.78<H>    Ca    8.1<L>      06 May 2019 07:45  Phos  4.1     05-06    TPro  x   /  Alb  2.0<L>  /  TBili  x   /  DBili  x   /  AST  x   /  ALT  x   /  AlkPhos  x   05-06               9.8    16.81 )-----------( 443      ( 01 May 2019 06:35 )             32.1   PT/INR - ( 06 May 2019 07:45 )   PT: 19.4 sec;   INR: 1.72 ratio                                 9.9    13.16 )-----------( 358      ( 30 Apr 2019 06:08 )             32.1                           10.1   7.96  )-----------( 334      ( 29 Apr 2019 07:08 )             33.2     04-29 04-30    142  |  111<H>  |  19  ----------------------------<  87  3.8   |  24  |  2.70<H>    Ca    8.3<L>      30 Apr 2019 06:08  Phos  3.4     04-29    TPro  x   /  Alb  1.8<L>  /  TBili  x   /  DBili  x   /  AST  x   /  ALT  x   /  AlkPhos  x   04-29  138  |  108  |  35<H>  ----------------------------<  95  4.4   |  22  |  4.61<H>    Ca    8.0<L>      29 Apr 2019 07:08              PT/INR - ( 29 Apr 2019 07:08 )   PT: 39.9 sec;   INR: 3.46 ratio                   RADIOLOGY & ADDITIONAL STUDIES:  < from: CT Chest No Cont (04.28.19 @ 12:17) >  PROCEDURE:   CT of the Chest was performed without intravenous contrast.  Sagittal and coronal reformats were performed.    FINDINGS:    LUNGS AND LARGE AIRWAYS: Emphysema. Continued increase size of tubular   oblong opacity in the right middle lobe, possibly endobronchial mass with   impacted airways. Possible associated broncholiths. Irregular opacity in   the posterior left upper lobe (series 3, image 60), measuring 1.4 cm.  PLEURA: Small left pleural effusion. Trace right pleural effusion.  VESSELS: Atherosclerotic calcifications.   HEART: Heart size is normal. No pericardial effusion.  MEDIASTINUM AND [place]: No lymphadenopathy.  CHEST WALL AND LOWER NECK: Left thyroid nodule, measuring 2.9 cm.   Extension of the thyroid gland into the superior mediastinum.   VISUALIZED UPPER ABDOMEN: Cholelithiasis. Unchanged probable cyst in   hepatic segment 8.  BONES: Degenerative changes of the spine.    IMPRESSION:   Continued increase size of tubular oblong opacity in the right middle   lobe, possibly endobronchial mass with impacted airways. Irregular   opacity in the posterior left upper lobe, measuring 1.4 cm. PET/CT is   recommended for further evaluation.    Left thyroid nodule, measuring 2.9 cm. Further evaluation with thyroid   ultrasound is recommended.    < end of copied text >

## 2019-05-15 NOTE — PROGRESS NOTE ADULT - SUBJECTIVE AND OBJECTIVE BOX
NEPHROLOGY INTERVAL HPI/OVERNIGHT EVENTS:    Date of Service: 05-15-19 @ 10:34  5/15 SY  Alert. No acute events overnight.  Feeling well today.      MR head w maninder and CT abd/ pelvis w IVC noted   no evidence of mets  Pt on extra dialysis sessions due to Maninder administration  tolerating well     SY  No acute events overnight.  Reports feeling fairly well.      feels well  Dr Mcneill input noted   for outpt staging of bronchial lesion    05-10-19 @ 10:13  seen at HD  pulmonary and CTS input noted.    no new c/o appetite fair, no sob, cough improved  no diarrhea    MEDICATIONS  (STANDING):  allopurinol 100 milliGRAM(s) Oral <User Schedule>  artificial  tears Solution 1 Drop(s) Both EYES three times a day  atorvastatin 20 milliGRAM(s) Oral at bedtime  cholestyramine Powder (Sugar-Free) 4 Gram(s) Oral daily  collagenase Ointment 1 Application(s) Topical two times a day  diltiazem    Tablet 60 milliGRAM(s) Oral four times a day  doxercalciferol Injectable 1 MICROGram(s) IV Push <User Schedule>  epoetin nelly Injectable 33588 Unit(s) IV Push <User Schedule>  finasteride 5 milliGRAM(s) Oral daily  gabapentin 300 milliGRAM(s) Oral daily  lidocaine/prilocaine Cream 1 Application(s) Topical daily  pantoprazole    Tablet 40 milliGRAM(s) Oral before breakfast  predniSONE   Tablet 5 milliGRAM(s) Oral every other day  predniSONE   Tablet 2.5 milliGRAM(s) Oral every other day  silver sulfADIAZINE 1% Cream 1 Application(s) Topical daily    MEDICATIONS  (PRN):  acetaminophen   Tablet .. 650 milliGRAM(s) Oral every 6 hours PRN Mild Pain (1 - 3)  ALBUTerol    90 MICROgram(s) HFA Inhaler 2 Puff(s) Inhalation every 6 hours PRN Shortness of Breath and/or Wheezing  guaiFENesin   Syrup  (Sugar-Free) 100 milliGRAM(s) Oral every 6 hours PRN Cough  oxyCODONE    5 mG/acetaminophen 325 mG 1 Tablet(s) Oral every 4 hours PRN Moderate Pain (4 - 6)      Vital Signs Last 24 Hrs  T(C): 36.3 (15 May 2019 08:10), Max: 37.2 (14 May 2019 22:00)  T(F): 97.4 (15 May 2019 08:10), Max: 98.9 (14 May 2019 22:00)  HR: 89 (15 May 2019 10:21) (65 - 93)  BP: 137/39 (15 May 2019 10:21) (99/29 - 170/41)  BP(mean): --  RR: 16 (15 May 2019 10:21) (16 - 18)  SpO2: 96% (15 May 2019 06:11) (96% - 100%)  Daily     Daily Weight in k.6 (15 May 2019 05:21)      PHYSICAL EXAM:  Alert and appropriate  GENERAL: No distress  CHEST/LUNG: Minimal right basilar rales  HEART: S1S2 RRR  ABDOMEN: soft  EXTREMITIES: improved edema  SKIN:     LABS:                        9.9    9.18  )-----------( 215      ( 15 May 2019 07:40 )             32.7     05-15    140  |  109<H>  |  32<H>  ----------------------------<  101<H>  3.9   |  25  |  2.72<H>    Ca    7.9<L>      15 May 2019 07:40  Phos  3.5     05-15    TPro  x   /  Alb  2.0<L>  /  TBili  x   /  DBili  x   /  AST  x   /  ALT  x   /  AlkPhos  x   05-15    PT/INR - ( 15 May 2019 07:40 )   PT: 24.2 sec;   INR: 2.13 ratio             Phosphorus Level, Serum: 3.5 mg/dL (05-15 @ 07:40)          RADIOLOGY & ADDITIONAL TESTS:

## 2019-05-15 NOTE — PROGRESS NOTE ADULT - SUBJECTIVE AND OBJECTIVE BOX
CHIEF COMPLAINT:    SUBJECTIVE:     REVIEW OF SYSTEMS:  CONSTITUTIONAL: No weakness, fevers or chills  EYES/ENT: No visual changes;  No vertigo or throat pain   NECK: No pain or stiffness  RESPIRATORY: No cough, wheezing, hemoptysis; No shortness of breath  CARDIOVASCULAR: No chest pain or palpitations  GASTROINTESTINAL: No abdominal or epigastric pain. No nausea, vomiting, or hematemesis; No diarrhea or constipation. No melena or hematochezia.  GENITOURINARY: No dysuria, frequency or hematuria  NEUROLOGICAL: No numbness or weakness  SKIN: No itching, burning, rashes, or lesions   All other review of systems is negative unless indicated above    Vital Signs Last 24 Hrs  T(C): 36.6 (15 May 2019 06:11), Max: 37.2 (14 May 2019 22:00)  T(F): 97.9 (15 May 2019 06:11), Max: 98.9 (14 May 2019 22:00)  HR: 65 (15 May 2019 06:18) (65 - 93)  BP: 144/33 (15 May 2019 06:18) (99/29 - 178/53)  BP(mean): --  RR: 18 (15 May 2019 06:11) (16 - 18)  SpO2: 96% (15 May 2019 06:11) (96% - 100%)    I&O's Summary      CAPILLARY BLOOD GLUCOSE          PHYSICAL EXAM:  Constitutional: NAD, awake and alert, well-developed  HEENT: PERR, EOMI, Normal Hearing, MMM  Neck: Soft and supple, No LAD, No JVD  Respiratory: Breath sounds are clear bilaterally, No wheezing, rales or rhonchi  Cardiovascular: S1 and S2, regular rate and rhythm, no Murmurs, gallops or rubs  Gastrointestinal: Bowel Sounds present, soft, nontender, nondistended, no guarding, no rebound  Extremities: No peripheral edema  Vascular: 2+ peripheral pulses  Neurological: A/O x 3, no focal deficits  Musculoskeletal: 5/5 strength b/l upper and lower extremities  Skin: No rashes    MEDICATIONS:  MEDICATIONS  (STANDING):  allopurinol 100 milliGRAM(s) Oral <User Schedule>  artificial  tears Solution 1 Drop(s) Both EYES three times a day  atorvastatin 20 milliGRAM(s) Oral at bedtime  cholestyramine Powder (Sugar-Free) 4 Gram(s) Oral daily  collagenase Ointment 1 Application(s) Topical two times a day  diltiazem    Tablet 60 milliGRAM(s) Oral four times a day  doxercalciferol Injectable 1 MICROGram(s) IV Push <User Schedule>  epoetin nelly Injectable 84880 Unit(s) IV Push <User Schedule>  finasteride 5 milliGRAM(s) Oral daily  gabapentin 300 milliGRAM(s) Oral daily  lidocaine/prilocaine Cream 1 Application(s) Topical daily  pantoprazole    Tablet 40 milliGRAM(s) Oral before breakfast  predniSONE   Tablet 5 milliGRAM(s) Oral every other day  predniSONE   Tablet 2.5 milliGRAM(s) Oral every other day  silver sulfADIAZINE 1% Cream 1 Application(s) Topical daily      LABS: All Labs Reviewed:                        9.5    8.66  )-----------( 193      ( 14 May 2019 09:45 )             31.3     05-14    140  |  107  |  37<H>  ----------------------------<  119<H>  3.8   |  25  |  2.80<H>    Ca    7.9<L>      14 May 2019 09:45  Phos  3.1     05-14    TPro  x   /  Alb  1.9<L>  /  TBili  x   /  DBili  x   /  AST  x   /  ALT  x   /  AlkPhos  x   05-14    PT/INR - ( 14 May 2019 09:45 )   PT: 33.8 sec;   INR: 2.95 ratio               Blood Culture:     RADIOLOGY/EKG:    DVT PPX:    ADVANCED DIRECTIVE:    DISPOSITION: Hospital Course: 85 y/o male with ESRD on hemodialysis (MWF), hypertension, rheumatoid arthritis, on methotrexate and steroids, right leg amputation, severe CDI and megacolon, Severe peripheral artery disease who had a left lower extremity endarterectomy (4/1/19)with dr Clement who presents with a temperature of 100.5 and discharge from the left groin. The patient had a duplex US that showed a hematoma of the left groin. The patient also complains of right arm pain and swelling where the patient had a fistulogram of the AC fistula on last admission. The patient was started on vancomycin given his history recurrent c. diff infection as well as aztreonam for the wound. The aztreonam was discontinued and the patient was started on daptomycin and cefepime for VRE bacteria from the wound site and possible pna found on chest xray. During the hospital course the patient was pancytopenic so the methotrexate was held and it resolved. Vascular surgery did debridement of the wound site as well as a muscle flap. While in the hospital the pt was coughing up blood which lead to a CT of the chest. This revealed an enlarging right middle lobe opacity, a new FERNANDO nodule, and a thyroid nodule. The US of the thyroid recommend FNA outpatient. A PET scan was also recommended for the outpatient. The patient had a flexible bronchoscopy on 5/6. Path report yielded nonsmall cell carcinoma, favoring squamous cell. Pt was seen by Heme/onc and rad onc. CT Abdomen and pelvis and MRI of the brain was done to evaluate for metastasis which showed no evidence.    5/15: Pt was seen and examined. No overnight events or acute complaints. Pt currently stable for d/c to [facility] today post HD.     REVIEW OF SYSTEMS:  CONSTITUTIONAL: No weakness, fevers or chills  EYES/ENT: No visual changes;  No vertigo or throat pain   NECK: No pain or stiffness  RESPIRATORY: No cough, wheezing, hemoptysis; No shortness of breath  CARDIOVASCULAR: No chest pain or palpitations  GASTROINTESTINAL: No abdominal or epigastric pain. No nausea, vomiting, or hematemesis; No diarrhea or constipation. No melena or hematochezia.  GENITOURINARY: No dysuria, frequency or hematuria  NEUROLOGICAL: No numbness or weakness  SKIN: Left ankle and thigh wound.   All other review of systems is negative unless indicated above    Vital Signs Last 24 Hrs  T(C): 36.6 (15 May 2019 06:11), Max: 37.2 (14 May 2019 22:00)  T(F): 97.9 (15 May 2019 06:11), Max: 98.9 (14 May 2019 22:00)  HR: 65 (15 May 2019 06:18) (65 - 93)  BP: 144/33 (15 May 2019 06:18) (99/29 - 178/53)  RR: 18 (15 May 2019 06:11) (16 - 18)  SpO2: 96% (15 May 2019 06:11) (96% - 100%)      PHYSICAL EXAM:  Constitutional: NAD, awake and alert, well-developed  HEENT: PERR, EOMI, Normal Hearing, MMM  Neck: Soft and supple, No LAD, No JVD. Right neck sutures  Respiratory: Diminished breath sounds on the right  Cardiovascular: S1 and S2, regular rate and rhythm, no Murmurs, gallops or rubs  Gastrointestinal: Bowel Sounds present, soft, nontender, nondistended, no guarding, no rebound  Extremities: No peripheral edema. R BKA, Left surgical site C/D/I. Left ankle wound not draining. IAD noted at buttocks.   Vascular: 2+ peripheral pulses  Neurological: A/O x 3, no focal deficits  Musculoskeletal: Normal strength  Skin: No rashes      MEDICATIONS:  MEDICATIONS  (STANDING):  allopurinol 100 milliGRAM(s) Oral <User Schedule>  artificial  tears Solution 1 Drop(s) Both EYES three times a day  atorvastatin 20 milliGRAM(s) Oral at bedtime  cholestyramine Powder (Sugar-Free) 4 Gram(s) Oral daily  collagenase Ointment 1 Application(s) Topical two times a day  diltiazem    Tablet 60 milliGRAM(s) Oral four times a day  doxercalciferol Injectable 1 MICROGram(s) IV Push <User Schedule>  epoetin nelly Injectable 00712 Unit(s) IV Push <User Schedule>  finasteride 5 milliGRAM(s) Oral daily  gabapentin 300 milliGRAM(s) Oral daily  lidocaine/prilocaine Cream 1 Application(s) Topical daily  pantoprazole    Tablet 40 milliGRAM(s) Oral before breakfast  predniSONE   Tablet 5 milliGRAM(s) Oral every other day  predniSONE   Tablet 2.5 milliGRAM(s) Oral every other day  silver sulfADIAZINE 1% Cream 1 Application(s) Topical daily      LABS: All Labs Reviewed:                        9.5    8.66  )-----------( 193      ( 14 May 2019 09:45 )             31.3     05-14    140  |  107  |  37<H>  ----------------------------<  119<H>  3.8   |  25  |  2.80<H>    Ca    7.9<L>      14 May 2019 09:45  Phos  3.1     05-14    TPro  x   /  Alb  1.9<L>  /  TBili  x   /  DBili  x   /  AST  x   /  ALT  x   /  AlkPhos  x   05-14    PT/INR - ( 14 May 2019 09:45 )   PT: 33.8 sec;   INR: 2.95 ratio

## 2019-05-15 NOTE — CHART NOTE - NSCHARTNOTEFT_GEN_A_CORE
Spoke to FLORESITA Kay at [facility] and updated of pt hospital course and follow up recommendations. Spoke to Dr. Kay, FLORESITA at [facility] and updated of pt hospital course and follow up recommendations.

## 2019-05-15 NOTE — PROGRESS NOTE ADULT - PROVIDER SPECIALTY LIST ADULT
Cardiology
Family Medicine
Heme/Onc
Hospitalist
Infectious Disease
Nephrology
Plastic Surgery
Pulmonology
Thoracic Surgery
Vascular Surgery
Plastic Surgery
Family Medicine
Family Medicine
Nephrology
Pulmonology
Infectious Disease
Nephrology
Pulmonology
Family Medicine
Hospitalist
Family Medicine

## 2019-05-15 NOTE — CHART NOTE - NSCHARTNOTEFT_GEN_A_CORE
Assessment:     *nutrition reconsulted for assessment.   *pt reports good appetite; typically having 2-3 meals/day. Documented intake for meals typically ranges from %. Pt reports consuming gelatein supplement daily which provides additional 20 g protein  *pt reports [facility] w/ future plan for [facility]  *labs reviewed: K and PO4+ WNL  *reviewed renal diet restrictions w/ pt as he states he has had limited education regarding. Pt will require further review to for full comprehension.         Recommendations:    1. encourage adequate protein at each meal and gelatein between meals   2. monitor wt daily  3. monitor labs and electrolytes daily  4. reinforce diet edu PRN    Diet Prescription: Diet, Renal Restrictions:   For patients receiving Renal Replacement - No Protein Restr, No Conc K, No Conc Phos, Low Sodium  DASH/TLC {Sodium & Cholesterol Restricted} (DASH)  1500mL Fluid Restriction ([id number]) (05-06-19 @ 21:19)      Wt Hx:  Height (cm): 170.18 (04-19-19 @ 18:40)  Weight (kg): 74.7 (05-05-19 @ 06:30)  BMI (kg/m2): 25.8 (05-05-19 @ 06:30)        Estimated Needs:   [ x] no change since previous assessment    Estimated Energy Needs (calories/kg):  · Weight Used for Calculation  ideal  60Kg    Estimated Energy Needs (25-30 calories/kg):  · Weight  (lbs)  132.2 lb  · Weight (kg)  60 kg  · From (25cal/kg)  1500  · To (30cal/kg)  1800    Other Calculation:  · Other Calculation  Ht. 67 "     Wt. 72.1 Kg       no correct 2/2 Rt AKA BMI       IBW  60 Kg      Pt is at 121   %  IBW    Estimated Protein Needs (1.6-1.8 gm/kg):  · Weight  (lbs)  132.2  · Weight (kg)  60 kg  · From (1.6 g/kg)  96  · To (1.8 g/kg)  108    Estimated Fluid Needs (other amt-specify):  · Weight  (lbs)  132.2  · Weight (kg)  60  · Other Amt (enter ml/kg)  18  · Other ml/kg  1080      Nutrition Diagnosis is [x ] ongoing  [ ] resolved [ ] not applicable         New Nutrition Diagnosis: [x ] not applicable     Nutrition Diagnostic #1:  · Nutrition Diagnostic Terminology #1: Nutrient  · Nutrient: Increased nutrient needs (specify)  · Etiology: increased demand for protein due to dialysis and infection  · Signs/Symptoms: Dx ESRD on HD and admission for surgical site infection  · Nutrition Intervention: Medical Food Supplements  · Medical and Food Supplements: Modified food  · Goal/Expected Outcome: pt meet >80% of estimated nutr needs      Pertinent Medications: MEDICATIONS  (STANDING):  allopurinol 100 milliGRAM(s) Oral <User Schedule>  artificial  tears Solution 1 Drop(s) Both EYES three times a day  atorvastatin 20 milliGRAM(s) Oral at bedtime  cholestyramine Powder (Sugar-Free) 4 Gram(s) Oral daily  collagenase Ointment 1 Application(s) Topical two times a day  diltiazem    Tablet 60 milliGRAM(s) Oral four times a day  doxercalciferol Injectable 1 MICROGram(s) IV Push <User Schedule>  epoetin nelly Injectable 56810 Unit(s) IV Push <User Schedule>  finasteride 5 milliGRAM(s) Oral daily  gabapentin 300 milliGRAM(s) Oral daily  lidocaine/prilocaine Cream 1 Application(s) Topical daily  pantoprazole    Tablet 40 milliGRAM(s) Oral before breakfast  predniSONE   Tablet 5 milliGRAM(s) Oral every other day  predniSONE   Tablet 2.5 milliGRAM(s) Oral every other day  silver sulfADIAZINE 1% Cream 1 Application(s) Topical daily    MEDICATIONS  (PRN):  acetaminophen   Tablet .. 650 milliGRAM(s) Oral every 6 hours PRN Mild Pain (1 - 3)  ALBUTerol    90 MICROgram(s) HFA Inhaler 2 Puff(s) Inhalation every 6 hours PRN Shortness of Breath and/or Wheezing  guaiFENesin   Syrup  (Sugar-Free) 100 milliGRAM(s) Oral every 6 hours PRN Cough  oxyCODONE    5 mG/acetaminophen 325 mG 1 Tablet(s) Oral every 4 hours PRN Moderate Pain (4 - 6)    Pertinent Labs: 05-15 Na140 mmol/L Glu 101 mg/dL<H> K+ 3.9 mmol/L Cr  2.72 mg/dL<H> BUN 32 mg/dL<H> 05-15 Phos 3.5 mg/dL 05-15 Alb 2.0 g/dL<L>     CAPILLARY BLOOD GLUCOSE          Skin: omar score = 7          Monitoring and Evaluation:   [x] PO intake/Nutr support infusion [ x ] Tolerance to Nutr [ x ] weights [ x ] labs[ x ] follow up per protocol  [ ] other:

## 2019-05-17 DIAGNOSIS — T81.41XA INFECTION FOLLOWING A PROCEDURE, SUPERFICIAL INCISIONAL SURGICAL SITE, INITIAL ENCOUNTER: ICD-10-CM

## 2019-05-17 DIAGNOSIS — I13.2 HYPERTENSIVE HEART AND CHRONIC KIDNEY DISEASE WITH HEART FAILURE AND WITH STAGE 5 CHRONIC KIDNEY DISEASE, OR END STAGE RENAL DISEASE: ICD-10-CM

## 2019-05-17 DIAGNOSIS — Z89.611 ACQUIRED ABSENCE OF RIGHT LEG ABOVE KNEE: ICD-10-CM

## 2019-05-17 DIAGNOSIS — C34.2 MALIGNANT NEOPLASM OF MIDDLE LOBE, BRONCHUS OR LUNG: ICD-10-CM

## 2019-05-17 DIAGNOSIS — N40.0 BENIGN PROSTATIC HYPERPLASIA WITHOUT LOWER URINARY TRACT SYMPTOMS: ICD-10-CM

## 2019-05-17 DIAGNOSIS — G47.33 OBSTRUCTIVE SLEEP APNEA (ADULT) (PEDIATRIC): ICD-10-CM

## 2019-05-17 DIAGNOSIS — Z87.891 PERSONAL HISTORY OF NICOTINE DEPENDENCE: ICD-10-CM

## 2019-05-17 DIAGNOSIS — I25.10 ATHEROSCLEROTIC HEART DISEASE OF NATIVE CORONARY ARTERY WITHOUT ANGINA PECTORIS: ICD-10-CM

## 2019-05-17 DIAGNOSIS — K21.9 GASTRO-ESOPHAGEAL REFLUX DISEASE WITHOUT ESOPHAGITIS: ICD-10-CM

## 2019-05-17 DIAGNOSIS — I48.0 PAROXYSMAL ATRIAL FIBRILLATION: ICD-10-CM

## 2019-05-17 DIAGNOSIS — J98.11 ATELECTASIS: ICD-10-CM

## 2019-05-17 DIAGNOSIS — I50.32 CHRONIC DIASTOLIC (CONGESTIVE) HEART FAILURE: ICD-10-CM

## 2019-05-17 DIAGNOSIS — D64.9 ANEMIA, UNSPECIFIED: ICD-10-CM

## 2019-05-17 DIAGNOSIS — J18.9 PNEUMONIA, UNSPECIFIED ORGANISM: ICD-10-CM

## 2019-05-17 DIAGNOSIS — M06.9 RHEUMATOID ARTHRITIS, UNSPECIFIED: ICD-10-CM

## 2019-05-17 DIAGNOSIS — J44.0 CHRONIC OBSTRUCTIVE PULMONARY DISEASE WITH (ACUTE) LOWER RESPIRATORY INFECTION: ICD-10-CM

## 2019-05-17 DIAGNOSIS — L30.8 OTHER SPECIFIED DERMATITIS: ICD-10-CM

## 2019-05-17 DIAGNOSIS — N18.6 END STAGE RENAL DISEASE: ICD-10-CM

## 2019-05-17 DIAGNOSIS — Z99.2 DEPENDENCE ON RENAL DIALYSIS: ICD-10-CM

## 2019-05-17 DIAGNOSIS — I73.9 PERIPHERAL VASCULAR DISEASE, UNSPECIFIED: ICD-10-CM

## 2019-05-17 DIAGNOSIS — M10.9 GOUT, UNSPECIFIED: ICD-10-CM

## 2019-05-17 DIAGNOSIS — H26.9 UNSPECIFIED CATARACT: ICD-10-CM

## 2019-05-17 DIAGNOSIS — D61.818 OTHER PANCYTOPENIA: ICD-10-CM

## 2019-05-22 LAB
CULTURE RESULTS: SIGNIFICANT CHANGE UP
CULTURE RESULTS: SIGNIFICANT CHANGE UP
SPECIMEN SOURCE: SIGNIFICANT CHANGE UP
SPECIMEN SOURCE: SIGNIFICANT CHANGE UP

## 2019-06-04 ENCOUNTER — OUTPATIENT (OUTPATIENT)
Dept: OUTPATIENT SERVICES | Facility: HOSPITAL | Age: 84
LOS: 1 days | End: 2019-06-04

## 2019-06-04 ENCOUNTER — APPOINTMENT (OUTPATIENT)
Dept: NUCLEAR MEDICINE | Facility: CLINIC | Age: 84
End: 2019-06-04
Payer: MEDICARE

## 2019-06-04 DIAGNOSIS — Z95.9 PRESENCE OF CARDIAC AND VASCULAR IMPLANT AND GRAFT, UNSPECIFIED: Chronic | ICD-10-CM

## 2019-06-04 DIAGNOSIS — Z00.8 ENCOUNTER FOR OTHER GENERAL EXAMINATION: ICD-10-CM

## 2019-06-04 PROCEDURE — 78815 PET IMAGE W/CT SKULL-THIGH: CPT | Mod: 26,PI

## 2019-06-06 LAB
CULTURE RESULTS: SIGNIFICANT CHANGE UP
SPECIMEN SOURCE: SIGNIFICANT CHANGE UP

## 2019-06-14 DIAGNOSIS — Z87.39 PERSONAL HISTORY OF OTHER DISEASES OF THE MUSCULOSKELETAL SYSTEM AND CONNECTIVE TISSUE: ICD-10-CM

## 2019-06-14 DIAGNOSIS — Z86.79 PERSONAL HISTORY OF OTHER DISEASES OF THE CIRCULATORY SYSTEM: ICD-10-CM

## 2019-06-14 DIAGNOSIS — K59.39 OTHER MEGACOLON: ICD-10-CM

## 2019-06-14 DIAGNOSIS — C34.91 MALIGNANT NEOPLASM OF UNSPECIFIED PART OF RIGHT BRONCHUS OR LUNG: ICD-10-CM

## 2019-06-17 NOTE — PROGRESS NOTE ADULT - ASSESSMENT
ANTICOAGULATION FOLLOW-UP CLINIC VISIT    Patient Name:  Shahid Villalta  Date:  2019  Contact Type:  Face to Face    SUBJECTIVE:  Patient Findings     Positives:   Change in health, Change in medications    Comments:   Pt's hgb has gone up to 7.9 from 7.7.  Per Gume Brown pt should be checked on Thursday with INR and to try to get INR down to the low end.  If pt is continuing to trend up in the HGB then ok to wait until provider is back in on Friday otherwise will need to discuss with in clinic provider about a blood tranfusion.        Clinical Outcomes     Comments:   Pt's hgb has gone up to 7.9 from 7.7.  Per Gume Brown pt should be checked on Thursday with INR and to try to get INR down to the low end.  If pt is continuing to trend up in the HGB then ok to wait until provider is back in on Friday otherwise will need to discuss with in clinic provider about a blood tranfusion.           OBJECTIVE    INR Protime   Date Value Ref Range Status   2019 2.8 (A) 0.86 - 1.14 Final     Chromogenic Factor 10   Date Value Ref Range Status   2019 14 (L) 70 - 130 % Final     Comment:     Therapeutic Range:  A Chromogenic Factor 10 level of approximately 20-40%   inversely correlates with an INR of 2-3 for patients receiving Warfarin.   Chromogenic Factor 10 levels below 20% indicate an INR greater than 3 and   levels above 40% indicate an INR less than 2.         ASSESSMENT / PLAN  No question data found.  Anticoagulation Summary  As of 2019    INR goal:   2.0-3.0   TTR:   45.3 % (3.4 y)   INR used for dosin.8 (2019)   Warfarin maintenance plan:   2.5 mg (5 mg x 0.5) every Mon, Wed, Fri; 5 mg (5 mg x 1) all other days   Full warfarin instructions:   : Hold; : Hold; Otherwise 2.5 mg every Mon, Wed, Fri; 5 mg all other days   Weekly warfarin total:   27.5 mg   Plan last modified:   Manisha Pierson RN (2019)   Next INR check:   2019   Target end date:       Indications    Long  term current use of anticoagulant therapy [Z79.01]  Paroxysmal ventricular tachycardia (H) (Resolved) [I47.2]             Anticoagulation Episode Summary     INR check location:       Preferred lab:       Send INR reminders to:   [facility] TRIAGE    Comments:               See the Encounter Report to view Anticoagulation Flowsheet and Dosing Calendar (Go to Encounters tab in chart review, and find the Anticoagulation Therapy Visit)    Dosage adjustment made based on physician directed care plan.    Manisha Pierson RN                  Patient with c. dif colitis  compolicated by acute renal failure  was dialyzed yesterday  continue po vanco, flagyl  oob to chair as able  conitnue clear liquid diet and encourage PO

## 2019-06-18 ENCOUNTER — OUTPATIENT (OUTPATIENT)
Dept: OUTPATIENT SERVICES | Facility: HOSPITAL | Age: 84
LOS: 1 days | Discharge: HOME | End: 2019-06-18

## 2019-06-18 ENCOUNTER — APPOINTMENT (OUTPATIENT)
Dept: RADIATION ONCOLOGY | Facility: CLINIC | Age: 84
End: 2019-06-18

## 2019-06-18 DIAGNOSIS — Z95.9 PRESENCE OF CARDIAC AND VASCULAR IMPLANT AND GRAFT, UNSPECIFIED: Chronic | ICD-10-CM

## 2019-06-26 LAB
CULTURE RESULTS: SIGNIFICANT CHANGE UP
SPECIMEN SOURCE: SIGNIFICANT CHANGE UP

## 2019-07-16 NOTE — PROGRESS NOTE ADULT - ASSESSMENT
16-Jul-2019 10:20 Pt is a 82 y/o M w/pmhx of Afib,not on AC bc of recurrent GIB  CHF, CAD s/p stents, COPD, PNA, upper GI bleed (duodenal)  BIB EMS from [facility] living for fever, abd pain, and diarrhea that began 3 weeks ago. Was in the hospital for PNA tx and was later dx with C-diff and started on a course of PO vancomycin for 10 days for the C-diff. States that he has had diarrhea since before the course of abx. Pain has been increased for the past few weeks and is characterized as a cramping feeling. Daughters also note that there is increased distension. Also notes chest pain characterized as a cramping in the central chest. 83% O2 sat noted this morning at the assisted living facility. Takes O2 routinely at night but not during the day. (06 Jul 2018 23:55) found to have pseudomembranous colitis   He denies CP or SOB , there was a mention of AFIB with RVR but I only see sinus tach on admission EKG   1) Cont Po cardizem for PAF but no AC due to signifcant GIB history . If pt has recurrent PAF would consider AMIO to maintain NSR so far no significant sustained AF  on tele   2) ABX for colitis.   3) cont hydralazine for HTN   4) ? mild pulm edema on CT but given diarrhea and elevated BUN/CR would hold off on IV lasix BUN/CR keeps rising would cont IVFs   5) given V triplet would check LVfxn with an echo this admission, would also DC PO cardizem and try lopressor 25mg BID instead

## 2019-09-01 ENCOUNTER — INPATIENT (INPATIENT)
Facility: HOSPITAL | Age: 84
LOS: 14 days | Discharge: HOSPICE | DRG: 871 | End: 2019-09-16
Attending: FAMILY MEDICINE | Admitting: INTERNAL MEDICINE
Payer: MEDICARE

## 2019-09-01 VITALS
OXYGEN SATURATION: 100 % | TEMPERATURE: 100 F | DIASTOLIC BLOOD PRESSURE: 16 MMHG | SYSTOLIC BLOOD PRESSURE: 72 MMHG | WEIGHT: 164.02 LBS | HEIGHT: 67 IN | HEART RATE: 124 BPM | RESPIRATION RATE: 20 BRPM

## 2019-09-01 DIAGNOSIS — K92.2 GASTROINTESTINAL HEMORRHAGE, UNSPECIFIED: ICD-10-CM

## 2019-09-01 DIAGNOSIS — Z95.9 PRESENCE OF CARDIAC AND VASCULAR IMPLANT AND GRAFT, UNSPECIFIED: Chronic | ICD-10-CM

## 2019-09-01 LAB
ALBUMIN SERPL ELPH-MCNC: 2.1 G/DL — LOW (ref 3.3–5)
ALBUMIN SERPL ELPH-MCNC: 2.3 G/DL — LOW (ref 3.3–5)
ALP SERPL-CCNC: 74 U/L — SIGNIFICANT CHANGE UP (ref 40–120)
ALT FLD-CCNC: 49 U/L — SIGNIFICANT CHANGE UP (ref 12–78)
ANION GAP SERPL CALC-SCNC: 12 MMOL/L — SIGNIFICANT CHANGE UP (ref 5–17)
ANION GAP SERPL CALC-SCNC: 16 MMOL/L — SIGNIFICANT CHANGE UP (ref 5–17)
ANION GAP SERPL CALC-SCNC: 9 MMOL/L — SIGNIFICANT CHANGE UP (ref 5–17)
APPEARANCE UR: ABNORMAL
APTT BLD: 43.8 SEC — HIGH (ref 27.5–36.3)
AST SERPL-CCNC: 21 U/L — SIGNIFICANT CHANGE UP (ref 15–37)
BASOPHILS # BLD AUTO: 0 K/UL — SIGNIFICANT CHANGE UP (ref 0–0.2)
BASOPHILS # BLD AUTO: 0 K/UL — SIGNIFICANT CHANGE UP (ref 0–0.2)
BASOPHILS NFR BLD AUTO: 0 % — SIGNIFICANT CHANGE UP (ref 0–2)
BASOPHILS NFR BLD AUTO: 0 % — SIGNIFICANT CHANGE UP (ref 0–2)
BILIRUB SERPL-MCNC: 0.4 MG/DL — SIGNIFICANT CHANGE UP (ref 0.2–1.2)
BILIRUB UR-MCNC: ABNORMAL
BUN SERPL-MCNC: 102 MG/DL — HIGH (ref 7–23)
BUN SERPL-MCNC: 93 MG/DL — HIGH (ref 7–23)
BUN SERPL-MCNC: 95 MG/DL — HIGH (ref 7–23)
CALCIUM SERPL-MCNC: 7.4 MG/DL — LOW (ref 8.5–10.1)
CALCIUM SERPL-MCNC: 7.8 MG/DL — LOW (ref 8.5–10.1)
CALCIUM SERPL-MCNC: 7.9 MG/DL — LOW (ref 8.5–10.1)
CHLORIDE SERPL-SCNC: 102 MMOL/L — SIGNIFICANT CHANGE UP (ref 96–108)
CHLORIDE SERPL-SCNC: 103 MMOL/L — SIGNIFICANT CHANGE UP (ref 96–108)
CHLORIDE SERPL-SCNC: 103 MMOL/L — SIGNIFICANT CHANGE UP (ref 96–108)
CO2 SERPL-SCNC: 20 MMOL/L — LOW (ref 22–31)
CO2 SERPL-SCNC: 23 MMOL/L — SIGNIFICANT CHANGE UP (ref 22–31)
CO2 SERPL-SCNC: 24 MMOL/L — SIGNIFICANT CHANGE UP (ref 22–31)
COLOR SPEC: ABNORMAL
CREAT SERPL-MCNC: 4.11 MG/DL — HIGH (ref 0.5–1.3)
CREAT SERPL-MCNC: 4.12 MG/DL — HIGH (ref 0.5–1.3)
CREAT SERPL-MCNC: 4.13 MG/DL — HIGH (ref 0.5–1.3)
DIFF PNL FLD: ABNORMAL
EOSINOPHIL # BLD AUTO: 0.01 K/UL — SIGNIFICANT CHANGE UP (ref 0–0.5)
EOSINOPHIL # BLD AUTO: 0.05 K/UL — SIGNIFICANT CHANGE UP (ref 0–0.5)
EOSINOPHIL NFR BLD AUTO: 1.7 % — SIGNIFICANT CHANGE UP (ref 0–6)
EOSINOPHIL NFR BLD AUTO: 8 % — HIGH (ref 0–6)
GLUCOSE SERPL-MCNC: 102 MG/DL — HIGH (ref 70–99)
GLUCOSE SERPL-MCNC: 105 MG/DL — HIGH (ref 70–99)
GLUCOSE SERPL-MCNC: 75 MG/DL — SIGNIFICANT CHANGE UP (ref 70–99)
GLUCOSE UR QL: NEGATIVE MG/DL — SIGNIFICANT CHANGE UP
HCT VFR BLD CALC: 21.8 % — LOW (ref 39–50)
HCT VFR BLD CALC: 24.8 % — LOW (ref 39–50)
HGB BLD-MCNC: 6.4 G/DL — CRITICAL LOW (ref 13–17)
HGB BLD-MCNC: 7.5 G/DL — LOW (ref 13–17)
IMM GRANULOCYTES NFR BLD AUTO: 0 % — SIGNIFICANT CHANGE UP (ref 0–1.5)
INR BLD: 2.06 RATIO — HIGH (ref 0.88–1.16)
INR BLD: 8.28 RATIO — CRITICAL HIGH (ref 0.88–1.16)
KETONES UR-MCNC: ABNORMAL
LACTATE SERPL-SCNC: 1.9 MMOL/L — SIGNIFICANT CHANGE UP (ref 0.7–2)
LDH SERPL L TO P-CCNC: 197 U/L — SIGNIFICANT CHANGE UP (ref 84–241)
LEUKOCYTE ESTERASE UR-ACNC: ABNORMAL
LYMPHOCYTES # BLD AUTO: 0.23 K/UL — LOW (ref 1–3.3)
LYMPHOCYTES # BLD AUTO: 0.3 K/UL — LOW (ref 1–3.3)
LYMPHOCYTES # BLD AUTO: 40 % — SIGNIFICANT CHANGE UP (ref 13–44)
LYMPHOCYTES # BLD AUTO: 50 % — HIGH (ref 13–44)
MCHC RBC-ENTMCNC: 29 PG — SIGNIFICANT CHANGE UP (ref 27–34)
MCHC RBC-ENTMCNC: 29.2 PG — SIGNIFICANT CHANGE UP (ref 27–34)
MCHC RBC-ENTMCNC: 29.4 GM/DL — LOW (ref 32–36)
MCHC RBC-ENTMCNC: 30.2 GM/DL — LOW (ref 32–36)
MCV RBC AUTO: 96.5 FL — SIGNIFICANT CHANGE UP (ref 80–100)
MCV RBC AUTO: 98.6 FL — SIGNIFICANT CHANGE UP (ref 80–100)
MONOCYTES # BLD AUTO: 0 K/UL — SIGNIFICANT CHANGE UP (ref 0–0.9)
MONOCYTES # BLD AUTO: 0.04 K/UL — SIGNIFICANT CHANGE UP (ref 0–0.9)
MONOCYTES NFR BLD AUTO: 0 % — LOW (ref 2–14)
MONOCYTES NFR BLD AUTO: 6.7 % — SIGNIFICANT CHANGE UP (ref 2–14)
NEUTROPHILS # BLD AUTO: 0.25 K/UL — LOW (ref 1.8–7.4)
NEUTROPHILS # BLD AUTO: 0.3 K/UL — LOW (ref 1.8–7.4)
NEUTROPHILS NFR BLD AUTO: 41.6 % — LOW (ref 43–77)
NEUTROPHILS NFR BLD AUTO: 44 % — SIGNIFICANT CHANGE UP (ref 43–77)
NITRITE UR-MCNC: NEGATIVE — SIGNIFICANT CHANGE UP
NRBC # BLD: SIGNIFICANT CHANGE UP /100 WBCS (ref 0–0)
PH UR: 5 — SIGNIFICANT CHANGE UP (ref 5–8)
PHOSPHATE SERPL-MCNC: 5 MG/DL — HIGH (ref 2.5–4.5)
PLATELET # BLD AUTO: 23 K/UL — LOW (ref 150–400)
PLATELET # BLD AUTO: 26 K/UL — LOW (ref 150–400)
POTASSIUM SERPL-MCNC: 5.4 MMOL/L — HIGH (ref 3.5–5.3)
POTASSIUM SERPL-MCNC: 6 MMOL/L — HIGH (ref 3.5–5.3)
POTASSIUM SERPL-MCNC: 6.1 MMOL/L — HIGH (ref 3.5–5.3)
POTASSIUM SERPL-SCNC: 5.4 MMOL/L — HIGH (ref 3.5–5.3)
POTASSIUM SERPL-SCNC: 6 MMOL/L — HIGH (ref 3.5–5.3)
POTASSIUM SERPL-SCNC: 6.1 MMOL/L — HIGH (ref 3.5–5.3)
PROT SERPL-MCNC: 5.3 GM/DL — LOW (ref 6–8.3)
PROT UR-MCNC: 100 MG/DL
PROTHROM AB SERPL-ACNC: 23.4 SEC — HIGH (ref 10–12.9)
PROTHROM AB SERPL-ACNC: 98.1 SEC — HIGH (ref 10–12.9)
RBC # BLD: 2.21 M/UL — LOW (ref 4.2–5.8)
RBC # BLD: 2.57 M/UL — LOW (ref 4.2–5.8)
RBC # FLD: 15.4 % — HIGH (ref 10.3–14.5)
RBC # FLD: 15.4 % — HIGH (ref 10.3–14.5)
SODIUM SERPL-SCNC: 136 MMOL/L — SIGNIFICANT CHANGE UP (ref 135–145)
SODIUM SERPL-SCNC: 138 MMOL/L — SIGNIFICANT CHANGE UP (ref 135–145)
SODIUM SERPL-SCNC: 138 MMOL/L — SIGNIFICANT CHANGE UP (ref 135–145)
SP GR SPEC: 1.01 — SIGNIFICANT CHANGE UP (ref 1.01–1.02)
URATE SERPL-MCNC: 3.5 MG/DL — SIGNIFICANT CHANGE UP (ref 3.4–8.8)
UROBILINOGEN FLD QL: NEGATIVE MG/DL — SIGNIFICANT CHANGE UP
WBC # BLD: 0.57 K/UL — CRITICAL LOW (ref 3.8–10.5)
WBC # BLD: 0.6 K/UL — CRITICAL LOW (ref 3.8–10.5)
WBC # FLD AUTO: 0.57 K/UL — CRITICAL LOW (ref 3.8–10.5)
WBC # FLD AUTO: 0.6 K/UL — CRITICAL LOW (ref 3.8–10.5)

## 2019-09-01 PROCEDURE — 36430 TRANSFUSION BLD/BLD COMPNT: CPT

## 2019-09-01 PROCEDURE — 94640 AIRWAY INHALATION TREATMENT: CPT

## 2019-09-01 PROCEDURE — P9016: CPT

## 2019-09-01 PROCEDURE — 80074 ACUTE HEPATITIS PANEL: CPT

## 2019-09-01 PROCEDURE — 85018 HEMOGLOBIN: CPT

## 2019-09-01 PROCEDURE — 80048 BASIC METABOLIC PNL TOTAL CA: CPT

## 2019-09-01 PROCEDURE — 83615 LACTATE (LD) (LDH) ENZYME: CPT

## 2019-09-01 PROCEDURE — 80299 QUANTITATIVE ASSAY DRUG: CPT

## 2019-09-01 PROCEDURE — 93010 ELECTROCARDIOGRAM REPORT: CPT

## 2019-09-01 PROCEDURE — 73030 X-RAY EXAM OF SHOULDER: CPT | Mod: RT

## 2019-09-01 PROCEDURE — 83735 ASSAY OF MAGNESIUM: CPT

## 2019-09-01 PROCEDURE — 85014 HEMATOCRIT: CPT

## 2019-09-01 PROCEDURE — 86850 RBC ANTIBODY SCREEN: CPT

## 2019-09-01 PROCEDURE — 85025 COMPLETE CBC W/AUTO DIFF WBC: CPT

## 2019-09-01 PROCEDURE — 94760 N-INVAS EAR/PLS OXIMETRY 1: CPT

## 2019-09-01 PROCEDURE — P9037: CPT

## 2019-09-01 PROCEDURE — C9113: CPT

## 2019-09-01 PROCEDURE — 82272 OCCULT BLD FECES 1-3 TESTS: CPT

## 2019-09-01 PROCEDURE — 97530 THERAPEUTIC ACTIVITIES: CPT | Mod: GP

## 2019-09-01 PROCEDURE — 97162 PT EVAL MOD COMPLEX 30 MIN: CPT | Mod: GP

## 2019-09-01 PROCEDURE — 74176 CT ABD & PELVIS W/O CONTRAST: CPT | Mod: 26

## 2019-09-01 PROCEDURE — 93971 EXTREMITY STUDY: CPT | Mod: RT

## 2019-09-01 PROCEDURE — 93005 ELECTROCARDIOGRAM TRACING: CPT

## 2019-09-01 PROCEDURE — 85027 COMPLETE CBC AUTOMATED: CPT

## 2019-09-01 PROCEDURE — 71250 CT THORAX DX C-: CPT | Mod: 26

## 2019-09-01 PROCEDURE — 87040 BLOOD CULTURE FOR BACTERIA: CPT

## 2019-09-01 PROCEDURE — 86901 BLOOD TYPING SEROLOGIC RH(D): CPT

## 2019-09-01 PROCEDURE — 86900 BLOOD TYPING SEROLOGIC ABO: CPT

## 2019-09-01 PROCEDURE — 85610 PROTHROMBIN TIME: CPT

## 2019-09-01 PROCEDURE — 36415 COLL VENOUS BLD VENIPUNCTURE: CPT

## 2019-09-01 PROCEDURE — 86923 COMPATIBILITY TEST ELECTRIC: CPT

## 2019-09-01 PROCEDURE — 71045 X-RAY EXAM CHEST 1 VIEW: CPT | Mod: 26

## 2019-09-01 PROCEDURE — 80069 RENAL FUNCTION PANEL: CPT

## 2019-09-01 PROCEDURE — 90935 HEMODIALYSIS ONE EVALUATION: CPT

## 2019-09-01 PROCEDURE — 84550 ASSAY OF BLOOD/URIC ACID: CPT

## 2019-09-01 RX ORDER — LIDOCAINE AND PRILOCAINE CREAM 25; 25 MG/G; MG/G
1 CREAM TOPICAL ONCE
Refills: 0 | Status: COMPLETED | OUTPATIENT
Start: 2019-09-01 | End: 2019-09-01

## 2019-09-01 RX ORDER — PANTOPRAZOLE SODIUM 20 MG/1
40 TABLET, DELAYED RELEASE ORAL
Refills: 0 | Status: DISCONTINUED | OUTPATIENT
Start: 2019-09-01 | End: 2019-09-10

## 2019-09-01 RX ORDER — PROTHROMBIN COMPLEX CONCENTRATE (HUMAN) 25.5; 16.5; 24; 22; 22; 26 [IU]/ML; [IU]/ML; [IU]/ML; [IU]/ML; [IU]/ML; [IU]/ML
1500 POWDER, FOR SOLUTION INTRAVENOUS ONCE
Refills: 0 | Status: COMPLETED | OUTPATIENT
Start: 2019-09-01 | End: 2019-09-01

## 2019-09-01 RX ORDER — DEXTROSE 50 % IN WATER 50 %
50 SYRINGE (ML) INTRAVENOUS ONCE
Refills: 0 | Status: COMPLETED | OUTPATIENT
Start: 2019-09-01 | End: 2019-09-01

## 2019-09-01 RX ORDER — DILTIAZEM HCL 120 MG
1 CAPSULE, EXT RELEASE 24 HR ORAL
Qty: 0 | Refills: 0 | DISCHARGE

## 2019-09-01 RX ORDER — ALBUTEROL 90 UG/1
10 AEROSOL, METERED ORAL ONCE
Refills: 0 | Status: COMPLETED | OUTPATIENT
Start: 2019-09-01 | End: 2019-09-01

## 2019-09-01 RX ORDER — HYDROCORTISONE 20 MG
100 TABLET ORAL EVERY 8 HOURS
Refills: 0 | Status: COMPLETED | OUTPATIENT
Start: 2019-09-01 | End: 2019-09-02

## 2019-09-01 RX ORDER — BUDESONIDE, MICRONIZED 100 %
0.5 POWDER (GRAM) MISCELLANEOUS
Refills: 0 | Status: DISCONTINUED | OUTPATIENT
Start: 2019-09-01 | End: 2019-09-09

## 2019-09-01 RX ORDER — SODIUM CHLORIDE 9 MG/ML
1000 INJECTION INTRAMUSCULAR; INTRAVENOUS; SUBCUTANEOUS ONCE
Refills: 0 | Status: COMPLETED | OUTPATIENT
Start: 2019-09-01 | End: 2019-09-01

## 2019-09-01 RX ORDER — SODIUM CHLORIDE 9 MG/ML
1000 INJECTION, SOLUTION INTRAVENOUS
Refills: 0 | Status: DISCONTINUED | OUTPATIENT
Start: 2019-09-01 | End: 2019-09-01

## 2019-09-01 RX ORDER — FINASTERIDE 5 MG/1
5 TABLET, FILM COATED ORAL DAILY
Refills: 0 | Status: DISCONTINUED | OUTPATIENT
Start: 2019-09-01 | End: 2019-09-12

## 2019-09-01 RX ORDER — VANCOMYCIN HCL 1 G
1000 VIAL (EA) INTRAVENOUS ONCE
Refills: 0 | Status: COMPLETED | OUTPATIENT
Start: 2019-09-01 | End: 2019-09-01

## 2019-09-01 RX ORDER — AZTREONAM 2 G
2000 VIAL (EA) INJECTION ONCE
Refills: 0 | Status: COMPLETED | OUTPATIENT
Start: 2019-09-01 | End: 2019-09-01

## 2019-09-01 RX ORDER — PANTOPRAZOLE SODIUM 20 MG/1
40 TABLET, DELAYED RELEASE ORAL ONCE
Refills: 0 | Status: COMPLETED | OUTPATIENT
Start: 2019-09-01 | End: 2019-09-01

## 2019-09-01 RX ORDER — FLUTICASONE PROPIONATE 50 MCG
1 SPRAY, SUSPENSION NASAL DAILY
Refills: 0 | Status: DISCONTINUED | OUTPATIENT
Start: 2019-09-01 | End: 2019-09-10

## 2019-09-01 RX ORDER — SODIUM BICARBONATE 1 MEQ/ML
50 SYRINGE (ML) INTRAVENOUS ONCE
Refills: 0 | Status: COMPLETED | OUTPATIENT
Start: 2019-09-01 | End: 2019-09-01

## 2019-09-01 RX ORDER — ACETAMINOPHEN 500 MG
650 TABLET ORAL ONCE
Refills: 0 | Status: COMPLETED | OUTPATIENT
Start: 2019-09-01 | End: 2019-09-01

## 2019-09-01 RX ORDER — ALLOPURINOL 300 MG
100 TABLET ORAL
Refills: 0 | Status: DISCONTINUED | OUTPATIENT
Start: 2019-09-01 | End: 2019-09-01

## 2019-09-01 RX ORDER — VANCOMYCIN HCL 1 G
125 VIAL (EA) INTRAVENOUS EVERY 6 HOURS
Refills: 0 | Status: DISCONTINUED | OUTPATIENT
Start: 2019-09-01 | End: 2019-09-10

## 2019-09-01 RX ORDER — PANTOPRAZOLE SODIUM 20 MG/1
40 TABLET, DELAYED RELEASE ORAL DAILY
Refills: 0 | Status: DISCONTINUED | OUTPATIENT
Start: 2019-09-01 | End: 2019-09-01

## 2019-09-01 RX ORDER — CALCIUM GLUCONATE 100 MG/ML
1 VIAL (ML) INTRAVENOUS ONCE
Refills: 0 | Status: COMPLETED | OUTPATIENT
Start: 2019-09-01 | End: 2019-09-01

## 2019-09-01 RX ORDER — ACETAMINOPHEN 500 MG
650 TABLET ORAL EVERY 6 HOURS
Refills: 0 | Status: DISCONTINUED | OUTPATIENT
Start: 2019-09-01 | End: 2019-09-16

## 2019-09-01 RX ORDER — CEFEPIME 1 G/1
1000 INJECTION, POWDER, FOR SOLUTION INTRAMUSCULAR; INTRAVENOUS DAILY
Refills: 0 | Status: DISCONTINUED | OUTPATIENT
Start: 2019-09-02 | End: 2019-09-10

## 2019-09-01 RX ORDER — INSULIN HUMAN 100 [IU]/ML
5 INJECTION, SOLUTION SUBCUTANEOUS ONCE
Refills: 0 | Status: COMPLETED | OUTPATIENT
Start: 2019-09-01 | End: 2019-09-01

## 2019-09-01 RX ORDER — ALBUTEROL 90 UG/1
2 AEROSOL, METERED ORAL EVERY 6 HOURS
Refills: 0 | Status: DISCONTINUED | OUTPATIENT
Start: 2019-09-01 | End: 2019-09-16

## 2019-09-01 RX ORDER — PHYTONADIONE (VIT K1) 5 MG
5 TABLET ORAL ONCE
Refills: 0 | Status: COMPLETED | OUTPATIENT
Start: 2019-09-01 | End: 2019-09-01

## 2019-09-01 RX ADMIN — Medication 250 MILLIGRAM(S): at 12:30

## 2019-09-01 RX ADMIN — SODIUM CHLORIDE 1000 MILLILITER(S): 9 INJECTION INTRAMUSCULAR; INTRAVENOUS; SUBCUTANEOUS at 11:40

## 2019-09-01 RX ADMIN — Medication 100 MILLIGRAM(S): at 17:39

## 2019-09-01 RX ADMIN — Medication 100 MILLIGRAM(S): at 14:28

## 2019-09-01 RX ADMIN — INSULIN HUMAN 5 UNIT(S): 100 INJECTION, SOLUTION SUBCUTANEOUS at 12:39

## 2019-09-01 RX ADMIN — PANTOPRAZOLE SODIUM 40 MILLIGRAM(S): 20 TABLET, DELAYED RELEASE ORAL at 17:40

## 2019-09-01 RX ADMIN — Medication 200 GRAM(S): at 13:34

## 2019-09-01 RX ADMIN — Medication 650 MILLIGRAM(S): at 12:42

## 2019-09-01 RX ADMIN — Medication 50 MILLIEQUIVALENT(S): at 12:44

## 2019-09-01 RX ADMIN — Medication 100 MILLIGRAM(S): at 23:02

## 2019-09-01 RX ADMIN — PROTHROMBIN COMPLEX CONCENTRATE (HUMAN) 400 INTERNATIONAL UNIT(S): 25.5; 16.5; 24; 22; 22; 26 POWDER, FOR SOLUTION INTRAVENOUS at 12:32

## 2019-09-01 RX ADMIN — SODIUM CHLORIDE 1000 MILLILITER(S): 9 INJECTION INTRAMUSCULAR; INTRAVENOUS; SUBCUTANEOUS at 13:35

## 2019-09-01 RX ADMIN — Medication 101 MILLIGRAM(S): at 12:51

## 2019-09-01 RX ADMIN — FINASTERIDE 5 MILLIGRAM(S): 5 TABLET, FILM COATED ORAL at 23:02

## 2019-09-01 RX ADMIN — Medication 125 MILLIGRAM(S): at 17:44

## 2019-09-01 RX ADMIN — ALBUTEROL 10 MILLIGRAM(S): 90 AEROSOL, METERED ORAL at 12:47

## 2019-09-01 RX ADMIN — LIDOCAINE AND PRILOCAINE CREAM 1 APPLICATION(S): 25; 25 CREAM TOPICAL at 22:32

## 2019-09-01 RX ADMIN — PANTOPRAZOLE SODIUM 40 MILLIGRAM(S): 20 TABLET, DELAYED RELEASE ORAL at 12:40

## 2019-09-01 RX ADMIN — Medication 50 MILLILITER(S): at 12:49

## 2019-09-01 RX ADMIN — Medication 125 MILLIGRAM(S): at 23:25

## 2019-09-01 RX ADMIN — PROTHROMBIN COMPLEX CONCENTRATE (HUMAN) 1500 INTERNATIONAL UNIT(S): 25.5; 16.5; 24; 22; 22; 26 POWDER, FOR SOLUTION INTRAVENOUS at 12:53

## 2019-09-01 RX ADMIN — Medication 5 MILLIGRAM(S): at 13:34

## 2019-09-01 RX ADMIN — Medication 1000 MILLIGRAM(S): at 13:35

## 2019-09-01 RX ADMIN — Medication 1 GRAM(S): at 14:29

## 2019-09-01 RX ADMIN — SODIUM CHLORIDE 100 MILLILITER(S): 9 INJECTION, SOLUTION INTRAVENOUS at 15:29

## 2019-09-01 NOTE — H&P ADULT - NSHPPHYSICALEXAM_GEN_ALL_CORE
PHYSICAL EXAM:    General: elderly obese male in moderate distress  Eyes: PERRLA, EOMI; conjunctiva and sclera clear  Head: Normocephalic; atraumatic  ENMT: No nasal discharge; airway clear, dried blood in mouth, dry mucosals  Neck: Supple; non tender; no masses  Respiratory: decreased BS at bases with some rales on right  Cardiovascular: S1, irregular, mildly tachycardic  Gastrointestinal: Soft abd with mild distention and NT  Genitourinary: No costovertebral angle tenderness  Extremities: Right AKA, Left LE with nearly healed distal 1/2 lateral wound  Vascular: Peripheral pulses palpable 2+ bilaterally  Neurological: Alert and oriented x 3, no gross deficits, + tremors  Skin: Warm and dry. + Excoriations on the back, Left distal lateral LE wound with granulation tissues  Musculoskeletal: Normal tone, Right AKA  Psychiatric: Cooperative

## 2019-09-01 NOTE — H&P ADULT - HISTORY OF PRESENT ILLNESS
84 y.o. Male with PMHx of ESRD on HD (MWF), HTN, RA on MTX and steroids, R AKA, Hx of severe CDI and megacolon, Severe PAD s/p LLE endarterectomy complicated by left groin infections s/p Tx and muscle flap, recent Dx of NSCLC not treated yet and planned for XRT this month sent from SNF due to fever up to 104F and black tarry stools this am as well as dry blood in mouth. Pt was lethargic day prior, c/o lower back pain. Found to have coagulopathy with INR>8, guaiac +, severe neutropenia, RUL/RLL infiltrates, increased in size RML mass. In ED pt was febrile, tachycardic and hypotensive, but rejected by intensivist for admission to MICU.  At the time of evaluation pt is more alert, shivering, c/o lower back pain with slightly improved BP.

## 2019-09-01 NOTE — CONSULT NOTE ADULT - SUBJECTIVE AND OBJECTIVE BOX
Patient is a 84y old  Male who presents with a chief complaint of fever, tarry stools (01 Sep 2019 15:09)    HPI:  84 y.o. Male with PMHx of ESRD on HD (MWF), HTN, RA on MTX and steroids, R AKA, Hx of severe CDI and megacolon, Severe PAD s/p LLE endarterectomy complicated by left groin infections s/p Tx and muscle flap, recent Dx of NSCLC not treated yet and planned for XRT this month admitted on  from [facility] for evaluation of fever to 104 and black tarry stools as well as dry blood in mouth; patient had been very lethargic over preceding 24 hours; he has complaints of lower back pain. Is tremulous awake, alert; history per family at bedside and medical record. Upon admission had elevated INR, neutropenia and extremely low hemoglobin. Patient denies cough specifically however is poor historian and family is unclear if he has been coughing.           PMH: as above  PSH: as above  Meds: per reconciliation sheet, noted below  MEDICATIONS  (STANDING):  allopurinol 100 milliGRAM(s) Oral <User Schedule>  buDESOnide    Inhalation Suspension 0.5 milliGRAM(s) Inhalation two times a day  finasteride 5 milliGRAM(s) Oral daily  fluticasone propionate 50 MICROgram(s)/spray Nasal Spray 1 Spray(s) Both Nostrils daily  hydrocortisone sodium succinate Injectable 100 milliGRAM(s) IV Push every 8 hours  lactated ringers. 1000 milliLiter(s) (100 mL/Hr) IV Continuous <Continuous>  pantoprazole  Injectable 40 milliGRAM(s) IV Push two times a day  vancomycin    Solution 125 milliGRAM(s) Oral every 6 hours    MEDICATIONS  (PRN):  acetaminophen   Tablet .. 650 milliGRAM(s) Oral every 6 hours PRN Temp greater or equal to 38C (100.4F), Mild Pain (1 - 3)  ALBUTerol    90 MICROgram(s) HFA Inhaler 2 Puff(s) Inhalation every 6 hours PRN Shortness of Breath and/or Wheezing    Allergies    Zosyn (Rash)    Intolerances      Social: no smoking, no alcohol, no illegal drugs; no recent travel, no exposure to TB  FAMILY HISTORY:  Family history of colorectal cancer  Family history of diabetes mellitus (Sibling)  Family history of hypertension     no history of premature cardiovascular disease in first degree relatives  ROS: unable to obtain secondary to patient medical condition     Vital Signs Last 24 Hrs  T(C): 37.2 (01 Sep 2019 15:30), Max: 39 (01 Sep 2019 11:31)  T(F): 98.9 (01 Sep 2019 15:30), Max: 102.2 (01 Sep 2019 11:31)  HR: 104 (01 Sep 2019 15:30) (101 - 124)  BP: 124/59 (01 Sep 2019 15:30) (72/16 - 124/59)  BP(mean): --  RR: 20 (01 Sep 2019 15:30) (18 - 24)  SpO2: 90% (01 Sep 2019 12:35) (90% - 100%)  Daily Height in cm: 170.18 (01 Sep 2019 11:05)    Daily     PE:    Constitutional: frail looking  HEENT: NC/AT, EOMI, PERRLA, conjunctivae clear; ears and nose atraumatic; pharynx clear  Neck: supple; thyroid not palpable  Back: no tenderness  Respiratory: respiratory effort normal; scattered coarse breath sounds  Cardiovascular: S1S2 regular, no murmurs  Abdomen: soft, not tender, not distended, positive BS; no liver or spleen organomegaly  Genitourinary: no suprapubic tenderness  Musculoskeletal: no muscle tenderness, right upper extremity with vascular access; right AKA  Neurological/ Psychiatric:   moving all extremities  Skin: no rashes; no palpable lesions    Labs: all available labs reviewed                        6.4    0.60  )-----------( 23       ( 01 Sep 2019 13:56 )             21.8     09    138  |  103  |  93<H>  ----------------------------<  75  6.1<H>   |  23  |  4.13<H>    Ca    7.9<L>      01 Sep 2019 11:17    TPro  5.3<L>  /  Alb  2.3<L>  /  TBili  0.4  /  DBili  x   /  AST  21  /  ALT  49  /  AlkPhos  74       LIVER FUNCTIONS - ( 01 Sep 2019 11:17 )  Alb: 2.3 g/dL / Pro: 5.3 gm/dL / ALK PHOS: 74 U/L / ALT: 49 U/L / AST: 21 U/L / GGT: x           Urinalysis Basic - ( 01 Sep 2019 11:27 )    Color: Brown / Appearance: very cloudy / S.015 / pH: x  Gluc: x / Ketone: Trace  / Bili: Moderate / Urobili: Negative mg/dL   Blood: x / Protein: 100 mg/dL / Nitrite: Negative   Leuk Esterase: Moderate / RBC: 6-10 /HPF / WBC >50   Sq Epi: x / Non Sq Epi: Occasional / Bacteria: Few    < from: CT Abdomen and Pelvis No Cont (19 @ 12:12) >    EXAM:  CT ABDOMEN AND PELVIS                          EXAM:  CT CHEST                            *** ADDENDUM 2019  ***    This addendum is dated 2019 at 12:49 PM.    There is some edema in the right paracolic gutter and adjacent to the   mesentery of the ascending colon. There is no colonic wall thickening in   this region but the possibility of mild colitis involving the ascending   colon is considered.    Differential diagnosis for the appearance of the focal thickening of the   right lateral wall of the rectum is focal proctitis or tumor. Again   direct visualization is recommended when clinically feasible.      *** END OF ADDENDUM 2019  ***      PROCEDURE DATE:  2019          INTERPRETATION:  Clinical information:Sepsis and lung cancer, back pain   and GI bleed.    Comparison: PET/CT dated 2019    PROCEDURE:   CT of the Chest, abdomen and pelvis was performed without intravenous   contrast.  Oral contrast was not administered.        FINDINGS:    CHEST:    LUNG AND LARGE AIRWAYS: Interval development of opacities in the right   upper lobe posteriorly likely on the basis of pneumonia. The mass in the   right middle lobe measures 7.1 x 5.5 cm previously 6.4 x 4.5 cm. Interval   development of opacities in the right lower lobe, likely on the basis of   pneumonia. There is atelectasis involving the left lower lobe medially.   There is a small amount of atelectasis involving the lingula.  PLEURA: within normal limits.  VESSELS: Atherosclerotic changes in the aorta and coronary arteries  HEART: normal size. No pericardial effusion.  MEDIASTINUM AND [place]: within normal limits.  CHEST WALL AND LOWER NECK: There is a new subcutaneous nodule in the left   anterior chest wall measuring 1.1 cm. A mediallylocated left anterior   chest wall nodule measuring 8 mm is stable. A 4.6 cm left lobe thyroid   nodule is stable.    ABDOMEN:  LIVER: Calcified granuloma.  BILE DUCTS: normal caliber.  GALLBLADDER: Distended containing a gallstone.  PANCREAS: withinnormal limits.  SPLEEN: within normal limits.  ADRENALS: within normal limits.  KIDNEYS: Bilateral renal hypodensities measuring up to 4.3 cm on the   right. A 1.4 cm hyperattenuating lesion in the upper pole of the right   kidney is again noted and may represent hemorrhagic cyst.    PELVIS:  REPRODUCTIVE ORGANS: no pelvic masses.  URETERS: within normal limits.  BLADDER: within normal limits.      BOWEL: There is a metallic clip again noted within the gastric fundus.   The colon is distended containing stool and air. There is asymmetric wall   thickening of the right side of the rectum.  PERITONEUM: no ascites or free air, no fluid collection.  VESSELS: Atherosclerotic changes. There is an IVC filter.  RETROPERITONEUM: No enlarged retroperitoneal or pelvic nodes.  ABDOMINAL WALL: There are postsurgical changes in both inguinal regions   again noted, left greater than right.  BONES: There is a mild to moderate anterior wedging deformity of the T12   vertebral body.    IMPRESSION: Increasein size of the right middle lobe lung mass.    New opacities in the right upper lobe and right lower lobe likely on the   basis of pneumonia. Tumor infiltration not excluded.    Areas of atelectasis involving the left lower lobe and lingula now noted.    2 subcutaneous nodules left anterior chest wall one of which is new and   for which metastatic disease is in the differential diagnosis.    Distended colon containing stool and air.    Asymmetric wall thickening right lateral rectum. This could represent   focal proctitis, however, direct visualization is suggested when   clinically feasible.        ***Please see the addendum at the top of this report. It may contain   additional important information or changes.****      < end of copied text >        Radiology: all available radiological tests reviewed    Advanced directives addressed: full resuscitation

## 2019-09-01 NOTE — ED ADULT TRIAGE NOTE - CHIEF COMPLAINT QUOTE
Patient brought in by EMS for bloody stools and fever. Patient initially had 104 temp at facility and then was given aspirin and came down to 102. Patient severely hypotensive in triage. MD Hunt aware. Patient direct bedded into trauma room. hx ESRD on dialysis (M/W/F). Patient on coumadin.

## 2019-09-01 NOTE — ED ADULT NURSE REASSESSMENT NOTE - NS ED NURSE REASSESS COMMENT FT1
PIV infiltrated left AC with Calcium gluconate infusing. PIV removed. IV team called to place new PIV. Pt sts he is feeling better. denies any pain or complaints at this time. PIV infiltrated left AC with Calcium gluconate infusing. PIV removed. IV team called to place new PIV. Pt sts he is feeling better. denies any pain or complaints at this time. Dr alberto aware of infiltrate.

## 2019-09-01 NOTE — ED PROVIDER NOTE - CLINICAL SUMMARY MEDICAL DECISION MAKING FREE TEXT BOX
85 y/o male with Hx of ESRD on dialysis presents with fevers and bloody stool from nursing facility. Septic by vitals. Will give broad spectrum Abx. Unclear source at this time will pan Cx and UA. Will CT chest/abd/pelvis to rule-out occult infection. Will give Protonix for concern for upper GI bleed.

## 2019-09-01 NOTE — ED ADULT NURSE NOTE - NSIMPLEMENTINTERV_GEN_ALL_ED
Implemented All Fall with Harm Risk Interventions:  [place] to call system. Call bell, personal items and telephone within reach. Instruct patient to call for assistance. Room bathroom lighting operational. Non-slip footwear when patient is off stretcher. Physically safe environment: no spills, clutter or unnecessary equipment. Stretcher in lowest position, wheels locked, appropriate side rails in place. Provide visual cue, wrist band, yellow gown, etc. Monitor gait and stability. Monitor for mental status changes and reorient to person, place, and time. Review medications for side effects contributing to fall risk. Reinforce activity limits and safety measures with patient and family. Provide visual clues: red socks.

## 2019-09-01 NOTE — ED ADULT NURSE NOTE - OBJECTIVE STATEMENT
pt brought to ED for evaluation  of GI bleed. pt only c/o back pain. pt placed in trauma, manual BP taken, 90/30, placed on monitor, . Straight cath done, family at bedside. MD evaluated at bedside. denies N/V/D. A and O x 3. pt on dialysis M/W/F. fistula RUE. RLE BKA. Avellyn dressing placed on pre existing pressure ulcers.

## 2019-09-01 NOTE — ED PROVIDER NOTE - OBJECTIVE STATEMENT
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 Pt was also found to have low BP. Family at bedside report receiving call from pt this afternoon, told pt febrile to 104. Hx of GI bleed due to "three hemostatic clips" in duodenum, where pt had to receive 14 units of blood. Family note pt is exhibiting behavior normal for baseline, but coughed up clot of blood PTA. +Back pain. Pt uses wheelchair at baseline, able to get into and out of wheelchair using "sliding board." Last dialysis Friday (08/30). Family not pt had surgery by Dr. Clement due to PVD and is in rehab for left leg weakness. Former smoker (Quit 25 years ago).    not on oxygen after baseline, uses o2 at dialysis. Allergy to Zosyn. 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 Pt was also found to have low BP. Family at bedside report receiving call from pt this morning, told pt febrile to 104. Hx of GI bleed due to "three hemostatic clips" in duodenum, where pt had to receive 14 units of blood. Family note pt is exhibiting behavior normal for baseline, but coughed up clot of blood PTA. +Back pain. Pt uses wheelchair at baseline, able to get into and out of wheelchair using "sliding board." Last dialysis Friday (08/30). Family not pt had surgery by Dr. Clement due to PVD and is in rehab for left leg weakness. Former smoker (Quit 25 years ago).    not on oxygen after baseline, uses o2 at dialysis. Allergy to Zosyn.

## 2019-09-01 NOTE — PROGRESS NOTE ADULT - ASSESSMENT
A/P: 84 male presenting with fevers to 104 and a likely RLL Pneumonia, GIB, and hyperkalemia    Plan:  PULM: Continue NC O2, RML known Mass(non-Small Cell), Likely RLL Pneumonia.  Agree with Aztreonam and Vanco.      Renal:  Patient hac cocktail to treat hyperkalemia in the ED.  He likely should have HD today.  Renal was called by the ED attending    GI: Hx of DU UGIB S/P clipping.  PAtient Guaiac + brown stool.  Suggest PPI BID, CBC q6H, and GI consult.      The patient does not require the ICU    If the patients condition changes please recall    D/W ED attending and family

## 2019-09-01 NOTE — CONSULT NOTE ADULT - ASSESSMENT
84 y.o. Male with PMHx of ESRD on HD (MWF), HTN, RA on MTX and steroids, R AKA, Hx of severe CDI and megacolon, Severe PAD s/p LLE endarterectomy complicated by left groin infections s/p Tx and muscle flap, recent Dx of NSCLC not treated yet and planned for XRT this month admitted on 9/1 from snf for evaluation of fever to 104 and black tarry stools as well as dry blood in mouth; patient had been very lethargic over preceding 24 hours; he has complaints of lower back pain. Is tremulous awake, alert; history per family at bedside and medical record. Upon admission had elevated INR, neutropenia and extremely low hemoglobin.     1. Patient admitted with GI bleeding, neutropenia, coagulopathy and profound anemia. Also noted with pneumonia which will treat as health care associated pneumonia given that patient admitted from snf  - patient at risk for gram negative rods and other resistant bacteria   - oxygen and nebs as needed   - serial cbc and monitor temperature   - iv hydration and supportive care   - reviewed prior medical records to evaluate for resistant or atypical pathogens   - will optimize antibiotics to cefepime, should tolerate with zosyn allergy  - Monitor closely in view of history of penicillin allergy   - will add vancomycin to treat resistant bacteria but intermittently dosed given renal function  - has history of cdiff therefore will start po vancomycin to prevent cdiff  - steroids per medicine   - given neutropenic fever patient at risk for atypical infections, monitor for this and will be on cefepime  - GI evaluation for GI Bleeding  - dialysis per schedule  - lung mass to be addressed at some point, the pneumonia most likely has component of post obstructive pneumonia as well  2. other issues: per medicine

## 2019-09-01 NOTE — PROGRESS NOTE ADULT - MINUTES
30 ,DirectAddress_Unknown,[email or web address]

## 2019-09-01 NOTE — ED PROVIDER NOTE - PROGRESS NOTE DETAILS
Ronald STONE for ED attending Dr. Hunt: ICU notified. Paged [facility]ro [facility]; still awaiting call back.

## 2019-09-01 NOTE — CONSULT NOTE ADULT - SUBJECTIVE AND OBJECTIVE BOX
COVERING FOR DR Zamora et al     84 y.o. Male with PMHx of ESRD on HD (MWF) at [facility] [address] , HTN, RA on MTX and steroids, R AKA, Hx of severe CDI and megacolon, Severe PAD s/p LLE endarterectomy complicated by left groin infections s/p Tx and muscle flap, recent Dx of NSCLC not treated yet and planned for XRT this month sent from SNF due to fever up to 104F and black tarry stools this am as well as dry blood/clots in mouth noted by daughter.   Pt was lethargic day prior, c/o lower back pain. Found to have coagulopathy with INR>8, guaiac +, severe neutropenia, RUL/RLL infiltrates, increased in size RML mass. In ED pt was febrile, tachycardic and hypotensive SBP 70s' and required IVF bolus.  Repeat Hb < 7 now - will need 2 Units PRBC    At the time of evaluation pt is more alert, shivering, c/o lower back pain with slightly improved BP   daughter at bedside  pt denies sob at this time       PAST MEDICAL & SURGICAL HISTORY:  Rheumatoid arthritis  Above knee amputation of right lower extremity  Recurrent Clostridium difficile diarrhea  Diastolic CHF  Peripheral vascular disease  Afib  Anemia  CKD (chronic kidney disease)  COPD (chronic obstructive pulmonary disease)  SHAYY (obstructive sleep apnea)  Sepsis, due to unspecified organism: 2/2 poorly healing wounds b/l  Dyspepsia: On moderate exertion.  Sleep apnea, obstructive: Requires home 02 therapy, and treatment with BIPAP  Atelectasis  Pleural effusion, bilateral  Respiratory failure  Peripheral edema  CRI (chronic renal insufficiency)  Gout  Benign prostatic hypertrophy  Spinal stenosis  Hypercholesterolemia  GERD (gastroesophageal reflux disease)  CAD (coronary artery disease)  Hypertension  S/P angioplasty with stent  Cataract of left eye  Prostate: Surgery green light procedure.  S/P rotator cuff surgery: Right  S/P angioplasty    Home Medications:  acetaminophen 325 mg oral tablet: 2 tab(s) orally every 6 hours, As needed, Temp greater or equal to 38C (100.4F), Mild Pain (1 - 3) (2019 12:26)  albuterol 90 mcg/inh inhalation aerosol: 2 puff(s) inhaled every 6 hours, As needed, Shortness of Breath and/or Wheezing (14 May 2019 16:12)  allopurinol 100 mg oral tablet: 1 tab(s) orally 4 times a week  **Tuesday, Thursday, Saturday, and *** (2019 12:26)  atorvastatin 20 mg oral tablet: 1 tab(s) orally once a day (2019 12:26)  cholestyramine 4 g/5 g oral powder for reconstitution: 1 packet(s) orally once a day (2019 12:26)  Claritin 10 mg oral tablet: 1 tab(s) orally once a day (2019 12:26)  dilTIAZem 240 mg/24 hours oral capsule, extended release: 1 cap(s) orally once a day (01 Sep 2019 14:58)  Emla 2.5%-2.5% topical cream: Apply topically to affected area befor dialysis (2019 12:26)  Flonase 50 mcg/inh nasal spray: 2 spray(s) in each nostril once a day (2019 12:26)  gabapentin 300 mg oral capsule: 1 cap(s) orally once a day (14 May 2019 16:12)  guaiFENesin 100 mg/5 mL oral liquid: 5 milliliter(s) orally every 6 hours, As needed, Cough (14 May 2019 16:13)  methotrexate 10 mg oral tablet: 1 tab(s) orally once a week (01 Sep 2019 15:04)  nitroglycerin: 0.4 milligram(s) orally , As Needed (2019 12:26)  predniSONE 5 mg oral tablet: 1 tab(s) orally once a day (01 Sep 2019 14:56)  Santyl 250 units/g topical ointment: Apply topically to affected area as directed (2019 12:26)  warfarin 3 mg oral tablet: 1 tab(s) orally once a day (2019 12:26)    MEDICATIONS  (STANDING):  allopurinol 100 milliGRAM(s) Oral <User Schedule>  buDESOnide    Inhalation Suspension 0.5 milliGRAM(s) Inhalation two times a day  finasteride 5 milliGRAM(s) Oral daily  fluticasone propionate 50 MICROgram(s)/spray Nasal Spray 1 Spray(s) Both Nostrils daily  hydrocortisone sodium succinate Injectable 100 milliGRAM(s) IV Push every 8 hours  lactated ringers. 1000 milliLiter(s) (100 mL/Hr) IV Continuous <Continuous>  pantoprazole  Injectable 40 milliGRAM(s) IV Push two times a day  vancomycin    Solution 125 milliGRAM(s) Oral every 6 hours      Allergies    Zosyn (Rash)    Intolerances      SOCIAL HISTORY:  Denies ETOh,Smoking,     FAMILY HISTORY:  Family history of colorectal cancer  Family history of diabetes mellitus (Sibling)  Family history of hypertension      REVIEW OF SYSTEMS:    CONSTITUTIONAL: weak ++   EYES/ENT: No visual changes;  No vertigo or throat pain   NECK: No pain or stiffness  RESPIRATORY: no cough   CARDIOVASCULAR: No chest pain or palpitations  GASTROINTESTINAL: No abdominal or epigastric pain. + melena .  GENITOURINARY: No dysuria, frequency or hematuria  NEUROLOGICAL: No numbness or weakness  SKIN: No itching, burning, rashes, or lesions   All other review of systems is negative unless indicated above.    Vital Signs Last 24 Hrs  T(C): 37.2 (01 Sep 2019 15:30), Max: 39 (01 Sep 2019 11:31)  T(F): 98.9 (01 Sep 2019 15:30), Max: 102.2 (01 Sep 2019 11:31)  HR: 104 (01 Sep 2019 17:01) (101 - 124)  BP: 114/75 (01 Sep 2019 17:01) (72/16 - 124/59)  BP(mean): 85 (01 Sep 2019 17:01) (72 - 85)  RR: 24 (01 Sep 2019 17:01) (18 - 24)  SpO2: 97% (01 Sep 2019 17:01) (90% - 100%)    I&O's Summary    01 Sep 2019 07:01  -  01 Sep 2019 17:30  --------------------------------------------------------  IN: 1300 mL / OUT: 0 mL / NET: 1300 mL      PHYSICAL EXAM:    Constitutional: NAD  HEENT: , EOMI,  MMM  Neck: No LAD, No JVD  Respiratory: CTAB  Cardiovascular: S1 and S2  Gastrointestinal: BS+, soft, NT/ND  Extremities: right AKA , left left ext - chronic changes  Neurological: A/O x 3,  Skin: No rashes  Access: RUE AVF + thrill and bruit     LABS:               138    |  102    |  95     ----------------------------<  105       01 Sep 2019 15:13  5.4     |  20     |  4.12     138    |  103    |  93     ----------------------------<  75        01 Sep 2019 11:17  6.1     |  23     |  4.13     Ca    7.8        01 Sep 2019 15:13  Ca    7.9        01 Sep 2019 11:17                           6.4    0.60  )-----------( 23       ( 01 Sep 2019 13:56 )             21.8                         7.5    0.57  )-----------( 26       ( 01 Sep 2019 11:17 )             24.8       Urine Studies:  Urinalysis Basic - ( 01 Sep 2019 11:27 )    Color: Brown / Appearance: very cloudy / S.015 / pH: x  Gluc: x / Ketone: Trace  / Bili: Moderate / Urobili: Negative mg/dL   Blood: x / Protein: 100 mg/dL / Nitrite: Negative   Leuk Esterase: Moderate / RBC: 6-10 /HPF / WBC >50   Sq Epi: x / Non Sq Epi: Occasional / Bacteria: Few      RADIOLOGY & ADDITIONAL STUDIES:        EXAM:  CT ABDOMEN AND PELVIS                          EXAM:  CT CHEST                            *** ADDENDUM 2019  ***    This addendum is dated 2019 at 12:49 PM.    There is some edema in the right paracolic gutter and adjacent to the   mesentery of the ascending colon. There is no colonic wall thickening in   this region but the possibility of mild colitis involving the ascending   colon is considered.    Differential diagnosis for the appearance of the focal thickening of the   right lateral wall of the rectum is focal proctitis or tumor. Again   direct visualization is recommended when clinically feasible.      *** END OF ADDENDUM 2019  ***      PROCEDURE DATE:  2019          INTERPRETATION:  Clinical information:Sepsis and lung cancer, back pain   and GI bleed.    Comparison: PET/CT dated 2019    PROCEDURE:   CT of the Chest, abdomen and pelvis was performed without intravenous   contrast.  Oral contrast was not administered.        FINDINGS:    CHEST:    LUNG AND LARGE AIRWAYS: Interval development of opacities in the right   upper lobe posteriorly likely on the basis of pneumonia. The mass in the   right middle lobe measures 7.1 x 5.5 cm previously 6.4 x 4.5 cm. Interval   development of opacities in the right lower lobe, likely on the basis of   pneumonia. There is atelectasis involving the left lower lobe medially.   There is a small amount of atelectasis involving the lingula.  PLEURA: within normal limits.  VESSELS: Atherosclerotic changes in the aorta and coronary arteries  HEART: normal size. No pericardial effusion.  MEDIASTINUM AND [place]: within normal limits.  CHEST WALL AND LOWER NECK: There is a new subcutaneous nodule in the left   anterior chest wall measuring 1.1 cm. A mediallylocated left anterior   chest wall nodule measuring 8 mm is stable. A 4.6 cm left lobe thyroid   nodule is stable.    ABDOMEN:  LIVER: Calcified granuloma.  BILE DUCTS: normal caliber.  GALLBLADDER: Distended containing a gallstone.  PANCREAS: withinnormal limits.  SPLEEN: within normal limits.  ADRENALS: within normal limits.  KIDNEYS: Bilateral renal hypodensities measuring up to 4.3 cm on the   right. A 1.4 cm hyperattenuating lesion in the upper pole of the right   kidney is again noted and may represent hemorrhagic cyst.    PELVIS:  REPRODUCTIVE ORGANS: no pelvic masses.  URETERS: within normal limits.  BLADDER: within normal limits.      BOWEL: There is a metallic clip again noted within the gastric fundus.   The colon is distended containing stool and air. There is asymmetric wall   thickening of the right side of the rectum.  PERITONEUM: no ascites or free air, no fluid collection.  VESSELS: Atherosclerotic changes. There is an IVC filter.  RETROPERITONEUM: No enlarged retroperitoneal or pelvic nodes.  ABDOMINAL WALL: There are postsurgical changes in both inguinal regions   again noted, left greater than right.  BONES: There is a mild to moderate anterior wedging deformity of the T12   vertebral body.    IMPRESSION: Increasein size of the right middle lobe lung mass.    New opacities in the right upper lobe and right lower lobe likely on the   basis of pneumonia. Tumor infiltration not excluded.    Areas of atelectasis involving the left lower lobe and lingula now noted.    2 subcutaneous nodules left anterior chest wall one of which is new and   for which metastatic disease is in the differential diagnosis.    Distended colon containing stool and air.    Asymmetric wall thickening right lateral rectum. This could represent   focal proctitis, however, direct visualization is suggested when   clinically feasible.        ***Please see the addendum at the top of this report. It may contain   additional important information or changes.****          JOMAR CARR M.D., ATTENDING RADIOLOGIST  This document has been electronically signed. Sep  1 2019 12:40PM  Addend:  JOMAR CARR M.D., ATTENDING RADIOLOGIST  This addendum was electronically signed on: Sep  1 2019 12:51PM.

## 2019-09-01 NOTE — ED ADULT NURSE NOTE - PLAN OF CARE
Bedside visitors/I and O/Call bell/Explanation of exam/test/Fall precautions/Position of comfort/Side rails

## 2019-09-01 NOTE — H&P ADULT - ASSESSMENT
84 y.o. Male with PMHx of ESRD on HD (MWF), HTN, RA on MTX and steroids, R AKA, Hx of severe CDI and megacolon, Severe PAD s/p LLE endarterectomy complicated by left groin infections s/p Tx and muscle flap, anemia of chronic dx s/p transfusions in the past, COPD/SHAYY, diastolic CHF, GERD, Gout, HLD, recent Dx of NSCLC not treated yet and planned for XRT this month sent from SNF due to fever up to 104F and black tarry stools this am as well as dry blood in mouth. Pt was lethargic day prior, c/o lower back pain. Found to have coagulopathy with INR>8, guaiac +, severe neutropenia, RUL/RLL infiltrates, increased in size RML mass. In ED pt was febrile, tachycardic and hypotensive, but rejected by intensivist for admission to MICU.  At the time of evaluation pt is more alert, shivering, c/o lower back pain with slightly improved BP.    1. Sepsis due to right sided PNA likely with gram positive/negative organisms in the settings of immunosuppression on methotrexate, steroids and progressive NSCLC of RML   - received IV vanco and aztreonam in ED - plan to cont vancomycin with HD, start cefepime tomorrow   - IVF hydration and reaccess fluid status after 12h   - BCx/UCx   - ID eval   - stress doses of steroids due to immunosuppression and neutropenia - reevaluate in am tapering    2. Coagulopathy - reversed with IV Vit K and KCentra with repeat INR 2 - f/u repeat in am    3. Hyperkalemia - s/p IV glucose with insulin, bicarbonate and calcium gluconate - f/u repeat K    4. GIB in the settings of coagulopathy - ? upper, cont IV PPI, clear liquid diet, monitor HH    5. Acute blood loss anemia on top of anemia of chronic Dx - monitor HH, may need transfusion    6. Afib on cardizem - held due to hypotension    7. ESRD - renal eval, HD in am, monitor fluid status closely    8. Gout - do not stop allopurinol    9. COPD/SHAYY - cont current meds    10. HFpEF - monitor for fluid overload    11. No VTE proph due to GIB

## 2019-09-01 NOTE — CONSULT NOTE ADULT - ASSESSMENT
85 yo w ESRD on HD ( MWF) , HTN, CDI/megalocolon hx , PAD w Right AKA, recent dx of NSLC not yet treated - was for planned radiation therapy after rehab completed at [facility], Now sent in c/o weakness, w fever 104, black tarry stools and blood clots in his mouth per his daughter. In ED hb was 7-->6's w hypotension SBP 70's and hyperkalemia K 6.1. Dtr reports this week pt required extra HD/PUF due to fluid overload and hypotension.      ESRD on HD w Hyperkalemia    in setting of  GIB /blood loss    will require PRBC - 2 untis - would arrange with HD w K/volume loads and hx of fluid overload    hypotension s/p 1 Liter fluid bolus    hx of CHF/fluid overload as above    arrange for HD today and then resume MWF if stable   low K diet once pt is eating      Anemia w coumadin coagulopathy /severe neutropenia/pancytopenia   while on MTX for RA   PRBC at HD as above   correct INR per Medicine    GI and hematology evaluations    DC allopurinol      Fevers w neutropenia    - ID eval for workup/abx    - fup cultures     d/w Dr Shabnma Cassidy   d/w HD RN   d/w SICU RN    Thank you for the courtesy of this consult. We will follow this patient with you.   Management is subject to change if new information becomes available or patient condition changes. 85 yo w ESRD on HD ( MWF) , HTN, CDI/megalocolon hx , PAD w Right AKA, recent dx of NSLC not yet treated - was for planned radiation therapy after rehab completed at [facility], Now sent in c/o weakness, w fever 104, black tarry stools and blood clots in his mouth per his daughter. In ED hb was 7-->6's w hypotension SBP 70's and hyperkalemia K 6.1. Dtr reports this week pt required extra HD/PUF due to fluid overload and hypotension.      ESRD on HD w Hyperkalemia    in setting of  GIB /blood loss    will require PRBC - 2 untis - would arrange with HD w K/volume loads and hx of fluid overload    hypotension s/p 1 Liter fluid bolus    hx of CHF/fluid overload as above    arrange for HD today and then resume MWF if stable   low K diet once pt is eating    DC LR ( has K content) if further IVF may use low rate NS however risk of fluid overload - then may require pressors         Anemia w coumadin coagulopathy /severe neutropenia/pancytopenia   while on MTX for RA   PRBC at HD as above   correct INR per Medicine    GI and hematology evaluations    DC allopurinol      Fevers w neutropenia    - ID eval for workup/abx    - fup cultures     d/w Dr Shabnam Cassidy   d/w HD RN   d/w SICU RN    Thank you for the courtesy of this consult. We will follow this patient with you.   Management is subject to change if new information becomes available or patient condition changes.

## 2019-09-02 LAB
ANION GAP SERPL CALC-SCNC: 7 MMOL/L — SIGNIFICANT CHANGE UP (ref 5–17)
BUN SERPL-MCNC: 59 MG/DL — HIGH (ref 7–23)
CALCIUM SERPL-MCNC: 7.7 MG/DL — LOW (ref 8.5–10.1)
CHLORIDE SERPL-SCNC: 105 MMOL/L — SIGNIFICANT CHANGE UP (ref 96–108)
CO2 SERPL-SCNC: 30 MMOL/L — SIGNIFICANT CHANGE UP (ref 22–31)
CREAT SERPL-MCNC: 2.61 MG/DL — HIGH (ref 0.5–1.3)
CULTURE RESULTS: SIGNIFICANT CHANGE UP
E COLI DNA BLD POS QL NAA+NON-PROBE: SIGNIFICANT CHANGE UP
GLUCOSE SERPL-MCNC: 106 MG/DL — HIGH (ref 70–99)
GRAM STN FLD: SIGNIFICANT CHANGE UP
GRAM STN FLD: SIGNIFICANT CHANGE UP
HAV IGM SER-ACNC: SIGNIFICANT CHANGE UP
HBV CORE IGM SER-ACNC: SIGNIFICANT CHANGE UP
HBV SURFACE AG SER-ACNC: SIGNIFICANT CHANGE UP
HCT VFR BLD CALC: 24.6 % — LOW (ref 39–50)
HCT VFR BLD CALC: 25.6 % — LOW (ref 39–50)
HCV AB S/CO SERPL IA: 0.1 S/CO — SIGNIFICANT CHANGE UP (ref 0–0.99)
HCV AB SERPL-IMP: SIGNIFICANT CHANGE UP
HGB BLD-MCNC: 7.7 G/DL — LOW (ref 13–17)
HGB BLD-MCNC: 7.8 G/DL — LOW (ref 13–17)
INR BLD: 1.43 RATIO — HIGH (ref 0.88–1.16)
MCHC RBC-ENTMCNC: 28.5 PG — SIGNIFICANT CHANGE UP (ref 27–34)
MCHC RBC-ENTMCNC: 30.5 GM/DL — LOW (ref 32–36)
MCV RBC AUTO: 93.4 FL — SIGNIFICANT CHANGE UP (ref 80–100)
METHOD TYPE: SIGNIFICANT CHANGE UP
PLATELET # BLD AUTO: 15 K/UL — CRITICAL LOW (ref 150–400)
POTASSIUM SERPL-MCNC: 4.8 MMOL/L — SIGNIFICANT CHANGE UP (ref 3.5–5.3)
POTASSIUM SERPL-SCNC: 4.8 MMOL/L — SIGNIFICANT CHANGE UP (ref 3.5–5.3)
PROTHROM AB SERPL-ACNC: 16.1 SEC — HIGH (ref 10–12.9)
RBC # BLD: 2.74 M/UL — LOW (ref 4.2–5.8)
RBC # FLD: 17.5 % — HIGH (ref 10.3–14.5)
SODIUM SERPL-SCNC: 142 MMOL/L — SIGNIFICANT CHANGE UP (ref 135–145)
SPECIMEN SOURCE: SIGNIFICANT CHANGE UP
WBC # BLD: 0.34 K/UL — CRITICAL LOW (ref 3.8–10.5)
WBC # FLD AUTO: 0.34 K/UL — CRITICAL LOW (ref 3.8–10.5)

## 2019-09-02 RX ORDER — ERYTHROPOIETIN 10000 [IU]/ML
10000 INJECTION, SOLUTION INTRAVENOUS; SUBCUTANEOUS
Refills: 0 | Status: DISCONTINUED | OUTPATIENT
Start: 2019-09-02 | End: 2019-09-16

## 2019-09-02 RX ORDER — OXYCODONE AND ACETAMINOPHEN 5; 325 MG/1; MG/1
1 TABLET ORAL EVERY 4 HOURS
Refills: 0 | Status: DISCONTINUED | OUTPATIENT
Start: 2019-09-02 | End: 2019-09-07

## 2019-09-02 RX ORDER — METHOCARBAMOL 500 MG/1
500 TABLET, FILM COATED ORAL EVERY 8 HOURS
Refills: 0 | Status: DISCONTINUED | OUTPATIENT
Start: 2019-09-02 | End: 2019-09-05

## 2019-09-02 RX ORDER — FILGRASTIM 480MCG/1.6
480 VIAL (ML) INJECTION DAILY
Refills: 0 | Status: COMPLETED | OUTPATIENT
Start: 2019-09-02 | End: 2019-09-04

## 2019-09-02 RX ADMIN — Medication 125 MILLIGRAM(S): at 15:08

## 2019-09-02 RX ADMIN — Medication 480 MICROGRAM(S): at 15:07

## 2019-09-02 RX ADMIN — PANTOPRAZOLE SODIUM 40 MILLIGRAM(S): 20 TABLET, DELAYED RELEASE ORAL at 05:32

## 2019-09-02 RX ADMIN — ERYTHROPOIETIN 10000 UNIT(S): 10000 INJECTION, SOLUTION INTRAVENOUS; SUBCUTANEOUS at 13:37

## 2019-09-02 RX ADMIN — CEFEPIME 1000 MILLIGRAM(S): 1 INJECTION, POWDER, FOR SOLUTION INTRAMUSCULAR; INTRAVENOUS at 15:10

## 2019-09-02 RX ADMIN — Medication 100 MILLIGRAM(S): at 05:32

## 2019-09-02 RX ADMIN — FINASTERIDE 5 MILLIGRAM(S): 5 TABLET, FILM COATED ORAL at 15:09

## 2019-09-02 RX ADMIN — Medication 125 MILLIGRAM(S): at 05:32

## 2019-09-02 RX ADMIN — PANTOPRAZOLE SODIUM 40 MILLIGRAM(S): 20 TABLET, DELAYED RELEASE ORAL at 20:27

## 2019-09-02 RX ADMIN — Medication 125 MILLIGRAM(S): at 20:27

## 2019-09-02 RX ADMIN — Medication 0.5 MILLIGRAM(S): at 07:54

## 2019-09-02 RX ADMIN — Medication 0.5 MILLIGRAM(S): at 20:25

## 2019-09-02 NOTE — PROGRESS NOTE ADULT - ASSESSMENT
83 yo w ESRD on HD ( MWF) , HTN, CDI/megalocolon hx , PAD w Right AKA, recent dx of NSLC not yet treated - was for planned radiation therapy after rehab completed at [facility], Now sent in c/o weakness, w fever 104, black tarry stools and blood clots in his mouth per his daughter. In ED hb was 7-->6's w hypotension SBP 70's and hyperkalemia K 6.1. Dtr reports this week pt required extra HD/PUF due to fluid overload and hypotension.      ESRD on HD w Hyperkalemia    in setting of  GIB /blood loss s/p PRBC - 2 units    arrange for regular HD today w PRBC x 1 unit   Uf as bp allows- 2 L today         Anemia w coumadin coagulopathy /severe neutropenia/pancytopenia   while on MTX for RA   PRBC at HD as above   correct INR per Medicine    GI    await heme - started on filgrastim   DC allopurinol    DC MTX      Fevers w neutropenia w GNR bactaremia    -  Iv abx per ID      d/w HD RN   d/w SICU RN      Thank you for the courtesy of this consult. We will follow this patient with you.   Management is subject to change if new information becomes available or patient condition changes.

## 2019-09-02 NOTE — CONSULT NOTE ADULT - SUBJECTIVE AND OBJECTIVE BOX
Patient is a 84y old  Male who presents with a chief complaint of fever, tarry stools (02 Sep 2019 14:32)      HPI:  84 y.o. Male with PMHx of ESRD on HD (MWF), HTN, RA on MTX and steroids, R AKA, Hx of severe CDI and megacolon, Severe PAD s/p LLE endarterectomy complicated by left groin infections s/p Tx and muscle flap, recent Dx of NSCLC in 5/2019 not treated yet and planned for XRT this month sent from [facility] due to fever up to 104F and black tarry stools this am as well as dry blood in mouth. Found to have coagulopathy with INR>8, guaiac +, severe neutropenia, RUL/RLL infiltrates, increased in size RML mass. In ED pt was febrile, tachycardic and hypotensive, upgradede to Stepdown unit.     THe patient underwent a interval PET CT prior to current hospitalization in June of 2014 which suggested local disease. The patient has not been able to follow up in our office as an outpatient as he had been at [facility] over the course of the last few weeks. He was initially scheduled to start RT a few weeks ago yet patient had postponed treatment due to fatigue. 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   Also of note the patient has a history of RA and recently had a flare and was started on MTX last dose Saturday.   Patient come to ED profoundly pancytopenic and febrile.     ROS:  Negative except for:    PAST MEDICAL & SURGICAL HISTORY:  Rheumatoid arthritis  Above knee amputation of right lower extremity  Recurrent Clostridium difficile diarrhea  Diastolic CHF  Peripheral vascular disease  Afib  Anemia  CKD (chronic kidney disease)  COPD (chronic obstructive pulmonary disease)  SHAYY (obstructive sleep apnea)  Sepsis, due to unspecified organism: 2/2 poorly healing wounds b/l  Dyspepsia: On moderate exertion.  Sleep apnea, obstructive: Requires home 02 therapy, and treatment with BIPAP  Atelectasis  Pleural effusion, bilateral  Respiratory failure  Peripheral edema  CRI (chronic renal insufficiency)  Gout  Benign prostatic hypertrophy  Spinal stenosis  Hypercholesterolemia  GERD (gastroesophageal reflux disease)  CAD (coronary artery disease)  Hypertension  S/P angioplasty with stent  Cataract of left eye  Prostate: Surgery green light procedure.  S/P rotator cuff surgery: Right  S/P angioplasty      SOCIAL HISTORY:    FAMILY HISTORY:  Family history of colorectal cancer  Family history of diabetes mellitus (Sibling)  Family history of hypertension      MEDICATIONS  (STANDING):  buDESOnide    Inhalation Suspension 0.5 milliGRAM(s) Inhalation two times a day  cefepime  Injectable. 1000 milliGRAM(s) IV Push daily  epoetin nelly Injectable 78098 Unit(s) IV Push <User Schedule>  [id number] Injectable 480 MICROGram(s) SubCutaneous daily  finasteride 5 milliGRAM(s) Oral daily  fluticasone propionate 50 MICROgram(s)/spray Nasal Spray 1 Spray(s) Both Nostrils daily  pantoprazole  Injectable 40 milliGRAM(s) IV Push two times a day  vancomycin    Solution 125 milliGRAM(s) Oral every 6 hours    MEDICATIONS  (PRN):  acetaminophen   Tablet .. 650 milliGRAM(s) Oral every 6 hours PRN Temp greater or equal to 38C (100.4F), Mild Pain (1 - 3)  ALBUTerol    90 MICROgram(s) HFA Inhaler 2 Puff(s) Inhalation every 6 hours PRN Shortness of Breath and/or Wheezing  methocarbamol 500 milliGRAM(s) Oral every 8 hours PRN mild pain  oxyCODONE    5 mG/acetaminophen 325 mG 1 Tablet(s) Oral every 4 hours PRN Moderate Pain (4 - 6)      Allergies    Zosyn (Rash)    Intolerances        Vital Signs Last 24 Hrs  T(C): 37.5 (02 Sep 2019 21:28), Max: 37.5 (02 Sep 2019 21:28)  T(F): 99.5 (02 Sep 2019 21:28), Max: 99.5 (02 Sep 2019 21:28)  HR: 96 (02 Sep 2019 21:00) (67 - 96)  BP: 147/69 (02 Sep 2019 21:00) (110/51 - 149/51)  BP(mean): 87 (02 Sep 2019 21:00) (65 - 87)  RR: 16 (02 Sep 2019 21:00) (11 - 22)  SpO2: 99% (02 Sep 2019 21:00) (97% - 100%)    PHYSICAL EXAM  General: adult in NAD  HEENT: clear oropharynx, anicteric sclera, pink conjunctiva  Neck: supple  CV: normal S1/S2 with no murmur rubs or gallops  Lungs: positive air movement b/l ant lungs,clear to auscultation, no wheezes, no rales  Abdomen: soft non-tender non-distended, no hepatosplenomegaly  Ext:  Right AKA  Skin: no rashes and no petechiae, Bilateral ecchymosis   Neuro: alert and oriented X 4, no focal deficits      LABS:                          7.8    0.34  )-----------( 15       ( 02 Sep 2019 03:48 )             25.6         Mean Cell Volume : 93.4 fl  Mean Cell Hemoglobin : 28.5 pg  Mean Cell Hemoglobin Concentration : 30.5 gm/dL  Auto Neutrophil # : 0.22 K/uL  Auto Lymphocyte # : 0.11 K/uL  Auto Monocyte # : 0.02 K/uL  Auto Eosinophil # : 0.01 K/uL  Auto Basophil # : 0.00 K/uL  Auto Neutrophil % : 59.5 %  Auto Lymphocyte % : 29.7 %  Auto Monocyte % : 5.4 %  Auto Eosinophil % : 2.7 %  Auto Basophil % : 0.0 %      09-02    142  |  105  |  59<H>  ----------------------------<  106<H>  4.8   |  30  |  2.61<H>    Ca    7.7<L>      02 Sep 2019 03:48  Phos  5.0     09-01    TPro  x   /  Alb  2.1<L>  /  TBili  x   /  DBili  x   /  AST  x   /  ALT  x   /  AlkPhos  x   09-01      PT/INR - ( 02 Sep 2019 03:48 )   PT: 16.1 sec;   INR: 1.43 ratio         PTT - ( 01 Sep 2019 11:17 )  PTT:43.8 sec                BLOOD SMEAR INTERPRETATION:       RADIOLOGY & ADDITIONAL STUDIES:

## 2019-09-02 NOTE — CONSULT NOTE ADULT - SUBJECTIVE AND OBJECTIVE BOX
HPI:  84 y.o. Male with PMHx of ESRD on HD (MWF), HTN, RA on MTX and steroids, R AKA, Hx of severe CDI and megacolon, Severe PAD s/p LLE endarterectomy complicated by left groin infections s/p Tx and muscle flap, recent Dx of NSCLC not treated yet and planned for XRT this month sent from SNF due to fever up to 104F and black tarry stools this am as well as dry blood in mouth. Pt was lethargic day prior, c/o lower back pain. Found to have coagulopathy with INR>8, guaiac +, severe neutropenia, RUL/RLL infiltrates, increased in size RML mass. In ED pt was febrile, tachycardic and hypotensive, but rejected by intensivist for admission to MICU.  At the time of evaluation pt is more alert, shivering, c/o lower back pain with slightly improved BP. (01 Sep 2019 15:09)  -------------------------------  Of note, patient was started on MTX 2 weeks ago. Here he is severely pancytopenic with INR > 8 which has improved now.  Has h/o UGI bleeding in 2017, endoscopic eval showing gastric ulcers, gastric AVMs and duodenal dieulafoy's    PAST MEDICAL & SURGICAL HISTORY:  Rheumatoid arthritis  Above knee amputation of right lower extremity  Recurrent Clostridium difficile diarrhea  Diastolic CHF  Peripheral vascular disease  Afib  Anemia  CKD (chronic kidney disease)  COPD (chronic obstructive pulmonary disease)  SHAYY (obstructive sleep apnea)  Sepsis, due to unspecified organism: 2/2 poorly healing wounds b/l  Dyspepsia: On moderate exertion.  Sleep apnea, obstructive: Requires home 02 therapy, and treatment with BIPAP  Atelectasis  Pleural effusion, bilateral  Respiratory failure  Peripheral edema  CRI (chronic renal insufficiency)  Gout  Benign prostatic hypertrophy  Spinal stenosis  Hypercholesterolemia  GERD (gastroesophageal reflux disease)  CAD (coronary artery disease)  Hypertension  S/P angioplasty with stent  Cataract of left eye  Prostate: Surgery green light procedure.  S/P rotator cuff surgery: Right  S/P angioplasty      Home Medications:  acetaminophen 325 mg oral tablet: 2 tab(s) orally every 6 hours, As needed, Temp greater or equal to 38C (100.4F), Mild Pain (1 - 3) (19 Apr 2019 12:26)  albuterol 90 mcg/inh inhalation aerosol: 2 puff(s) inhaled every 6 hours, As needed, Shortness of Breath and/or Wheezing (14 May 2019 16:12)  allopurinol 100 mg oral tablet: 1 tab(s) orally 4 times a week  **Tuesday, Thursday, Saturday, and Sunday*** (19 Apr 2019 12:26)  atorvastatin 20 mg oral tablet: 1 tab(s) orally once a day (19 Apr 2019 12:26)  cholestyramine 4 g/5 g oral powder for reconstitution: 1 packet(s) orally once a day (19 Apr 2019 12:26)  Claritin 10 mg oral tablet: 1 tab(s) orally once a day (19 Apr 2019 12:26)  dilTIAZem 240 mg/24 hours oral capsule, extended release: 1 cap(s) orally once a day (01 Sep 2019 14:58)  Emla 2.5%-2.5% topical cream: Apply topically to affected area befor dialysis (19 Apr 2019 12:26)  Flonase 50 mcg/inh nasal spray: 2 spray(s) in each nostril once a day (19 Apr 2019 12:26)  gabapentin 300 mg oral capsule: 1 cap(s) orally once a day (14 May 2019 16:12)  guaiFENesin 100 mg/5 mL oral liquid: 5 milliliter(s) orally every 6 hours, As needed, Cough (14 May 2019 16:13)  methotrexate 10 mg oral tablet: 1 tab(s) orally once a week (01 Sep 2019 15:04)  nitroglycerin: 0.4 milligram(s) orally , As Needed (19 Apr 2019 12:26)  predniSONE 5 mg oral tablet: 1 tab(s) orally once a day (01 Sep 2019 14:56)  Santyl 250 units/g topical ointment: Apply topically to affected area as directed (19 Apr 2019 12:26)  warfarin 3 mg oral tablet: 1 tab(s) orally once a day (19 Apr 2019 12:26)      MEDICATIONS  (STANDING):  buDESOnide    Inhalation Suspension 0.5 milliGRAM(s) Inhalation two times a day  cefepime  Injectable. 1000 milliGRAM(s) IV Push daily  filgrastim-sndz Injectable 480 MICROGram(s) SubCutaneous daily  finasteride 5 milliGRAM(s) Oral daily  fluticasone propionate 50 MICROgram(s)/spray Nasal Spray 1 Spray(s) Both Nostrils daily  pantoprazole  Injectable 40 milliGRAM(s) IV Push two times a day  vancomycin    Solution 125 milliGRAM(s) Oral every 6 hours    MEDICATIONS  (PRN):  acetaminophen   Tablet .. 650 milliGRAM(s) Oral every 6 hours PRN Temp greater or equal to 38C (100.4F), Mild Pain (1 - 3)  ALBUTerol    90 MICROgram(s) HFA Inhaler 2 Puff(s) Inhalation every 6 hours PRN Shortness of Breath and/or Wheezing      Allergies    Zosyn (Rash)    Intolerances        SOCIAL HISTORY:    FAMILY HISTORY:  Family history of colorectal cancer  Family history of diabetes mellitus (Sibling)  Family history of hypertension      ROS  As above  Otherwise unremarkable    Vital Signs Last 24 Hrs  T(C): 36.4 (02 Sep 2019 05:38), Max: 39 (01 Sep 2019 11:31)  T(F): 97.6 (02 Sep 2019 05:38), Max: 102.2 (01 Sep 2019 11:31)  HR: 79 (02 Sep 2019 10:01) (70 - 124)  BP: 128/66 (02 Sep 2019 10:01) (72/16 - 134/44)  BP(mean): 85 (02 Sep 2019 10:01) (63 - 85)  RR: 15 (02 Sep 2019 10:01) (11 - 24)  SpO2: 100% (02 Sep 2019 10:01) (90% - 100%)    Constitutional: NAD, well-developed  Respiratory: CTAB  Cardiovascular: S1 and S2, RRR  Gastrointestinal: BS+, soft, NT/ND  Extremities: No peripheral edema  Psychiatric: Normal mood, normal affect  Skin: No rashes    LABS:                        7.8    0.34  )-----------( 15       ( 02 Sep 2019 03:48 )             25.6     09-02    142  |  105  |  59<H>  ----------------------------<  106<H>  4.8   |  30  |  2.61<H>    Ca    7.7<L>      02 Sep 2019 03:48  Phos  5.0     09-01    TPro  x   /  Alb  2.1<L>  /  TBili  x   /  DBili  x   /  AST  x   /  ALT  x   /  AlkPhos  x   09-01    PT/INR - ( 02 Sep 2019 03:48 )   PT: 16.1 sec;   INR: 1.43 ratio         PTT - ( 01 Sep 2019 11:17 )  PTT:43.8 sec  LIVER FUNCTIONS - ( 01 Sep 2019 18:10 )  Alb: 2.1 g/dL / Pro: x     / ALK PHOS: x     / ALT: x     / AST: x     / GGT: x             RADIOLOGY & ADDITIONAL STUDIES:

## 2019-09-02 NOTE — PROGRESS NOTE ADULT - ASSESSMENT
84 y.o. Male with PMHx of ESRD on HD (MWF), HTN, RA on MTX and steroids, R AKA, Hx of severe CDI and megacolon, Severe PAD s/p LLE endarterectomy complicated by left groin infections s/p Tx and muscle flap, recent Dx of NSCLC not treated yet and planned for XRT this month admitted on 9/1 from snf for evaluation of fever to 104 and black tarry stools as well as dry blood in mouth; patient had been very lethargic over preceding 24 hours; he has complaints of lower back pain. Is tremulous awake, alert; history per family at bedside and medical record. Upon admission had elevated INR, neutropenia and extremely low hemoglobin.     1. Patient admitted with GI bleeding, neutropenia, coagulopathy and profound anemia. Also noted with pneumonia which will treat as health care associated pneumonia given that patient admitted from snf  - patient at risk for gram negative rods and other resistant bacteria --- found to have E coli in blood cultures  - oxygen and nebs as needed   - serial cbc and monitor temperature   - iv hydration and supportive care   - reviewed prior medical records to evaluate for resistant or atypical pathogens   - day #1 cefepime  - tolerating antibiotics without rashes or side effects   - Monitor closely in view of history of penicillin allergy   - will add vancomycin to treat resistant bacteria but intermittently dosed given renal function  -  po vancomycin to prevent cdiff  - steroids per medicine   - given neutropenic fever patient at risk for atypical infections, monitor for this and will be on cefepime; most likely E coli in blood is from bowel translocation in setting of neutropenia  - GI evaluation for GI Bleeding  - dialysis per schedule  - lung mass to be addressed at some point, the pneumonia most likely has component of post obstructive pneumonia as well  2. other issues: per medicine

## 2019-09-02 NOTE — CONSULT NOTE ADULT - ASSESSMENT
84 y.o. Male with PMHx of ESRD on HD (MWF), HTN, RA on MTX and steroids, R AKA, Hx of severe CDI and megacolon, Severe PAD s/p LLE endarterectomy complicated by left groin infections s/p Tx and muscle flap, recent Dx of NSCLC in 5/2019  now admitted for Gram negative Neutropenic Sepsis compliated by coagulopathy and multifactorial anemia secondary to blood loss.     discussed with mary and daughters at bedside that pancytopenia is  likely secondary to myelosuppression   Patient has undergone HD on two consecutive days   will assess MTX level in AM   will continue with supportive transfusion:   pRBCs if Hb less than 8   Platelets < 10 or active bleeding   Given gram negative bacteremia and critical status patient had been given filgrastim earlier this AM   CT suggestive of POD yet unclear if cutaneous suspicous lesions are actually disease, will require biopsy of lesions

## 2019-09-02 NOTE — CONSULT NOTE ADULT - ASSESSMENT
Imp:  Despite h/o upper GI pathlology, main cause of bleeding is likely related to high INR and low platelets, which I assume are related to MTX in setting of ESRD.    Rec;  Onc eval  Hold MTX  Supportive heme care etc  Protonix bid  Dr. Hernández returns tomorrow

## 2019-09-03 LAB
-  AMIKACIN: SIGNIFICANT CHANGE UP
-  AMPICILLIN/SULBACTAM: SIGNIFICANT CHANGE UP
-  AMPICILLIN: SIGNIFICANT CHANGE UP
-  AZTREONAM: SIGNIFICANT CHANGE UP
-  CEFAZOLIN: SIGNIFICANT CHANGE UP
-  CEFEPIME: SIGNIFICANT CHANGE UP
-  CEFOXITIN: SIGNIFICANT CHANGE UP
-  CEFTRIAXONE: SIGNIFICANT CHANGE UP
-  CIPROFLOXACIN: SIGNIFICANT CHANGE UP
-  ERTAPENEM: SIGNIFICANT CHANGE UP
-  GENTAMICIN: SIGNIFICANT CHANGE UP
-  IMIPENEM: SIGNIFICANT CHANGE UP
-  LEVOFLOXACIN: SIGNIFICANT CHANGE UP
-  MEROPENEM: SIGNIFICANT CHANGE UP
-  PIPERACILLIN/TAZOBACTAM: SIGNIFICANT CHANGE UP
-  TOBRAMYCIN: SIGNIFICANT CHANGE UP
-  TRIMETHOPRIM/SULFAMETHOXAZOLE: SIGNIFICANT CHANGE UP
ANION GAP SERPL CALC-SCNC: 11 MMOL/L — SIGNIFICANT CHANGE UP (ref 5–17)
BASOPHILS # BLD AUTO: 0 K/UL — SIGNIFICANT CHANGE UP (ref 0–0.2)
BASOPHILS NFR BLD AUTO: 0 % — SIGNIFICANT CHANGE UP (ref 0–2)
BUN SERPL-MCNC: 46 MG/DL — HIGH (ref 7–23)
CALCIUM SERPL-MCNC: 7.9 MG/DL — LOW (ref 8.5–10.1)
CHLORIDE SERPL-SCNC: 107 MMOL/L — SIGNIFICANT CHANGE UP (ref 96–108)
CO2 SERPL-SCNC: 25 MMOL/L — SIGNIFICANT CHANGE UP (ref 22–31)
CREAT SERPL-MCNC: 2.26 MG/DL — HIGH (ref 0.5–1.3)
CULTURE RESULTS: SIGNIFICANT CHANGE UP
CULTURE RESULTS: SIGNIFICANT CHANGE UP
EOSINOPHIL # BLD AUTO: 0.01 K/UL — SIGNIFICANT CHANGE UP (ref 0–0.5)
EOSINOPHIL NFR BLD AUTO: 3 % — SIGNIFICANT CHANGE UP (ref 0–6)
GLUCOSE SERPL-MCNC: 75 MG/DL — SIGNIFICANT CHANGE UP (ref 70–99)
HCT VFR BLD CALC: 28.9 % — LOW (ref 39–50)
HGB BLD-MCNC: 9 G/DL — LOW (ref 13–17)
INR BLD: 1.72 RATIO — HIGH (ref 0.88–1.16)
LYMPHOCYTES # BLD AUTO: 0.05 K/UL — LOW (ref 1–3.3)
LYMPHOCYTES # BLD AUTO: 15 % — SIGNIFICANT CHANGE UP (ref 13–44)
MCHC RBC-ENTMCNC: 28.6 PG — SIGNIFICANT CHANGE UP (ref 27–34)
MCHC RBC-ENTMCNC: 31.1 GM/DL — LOW (ref 32–36)
MCV RBC AUTO: 91.7 FL — SIGNIFICANT CHANGE UP (ref 80–100)
METHOD TYPE: SIGNIFICANT CHANGE UP
MONOCYTES # BLD AUTO: 0.03 K/UL — SIGNIFICANT CHANGE UP (ref 0–0.9)
MONOCYTES NFR BLD AUTO: 9 % — SIGNIFICANT CHANGE UP (ref 2–14)
MTX SERPL-SCNC: 0.06 UMOL/L — LOW (ref 0.5–5)
NEUTROPHILS # BLD AUTO: 0.22 K/UL — LOW (ref 1.8–7.4)
NEUTROPHILS NFR BLD AUTO: 73 % — SIGNIFICANT CHANGE UP (ref 43–77)
NRBC # BLD: SIGNIFICANT CHANGE UP /100 WBCS (ref 0–0)
ORGANISM # SPEC MICROSCOPIC CNT: SIGNIFICANT CHANGE UP
PLATELET # BLD AUTO: 15 K/UL — CRITICAL LOW (ref 150–400)
POTASSIUM SERPL-MCNC: 3.6 MMOL/L — SIGNIFICANT CHANGE UP (ref 3.5–5.3)
POTASSIUM SERPL-SCNC: 3.6 MMOL/L — SIGNIFICANT CHANGE UP (ref 3.5–5.3)
PROTHROM AB SERPL-ACNC: 19.4 SEC — HIGH (ref 10–12.9)
RBC # BLD: 3.15 M/UL — LOW (ref 4.2–5.8)
RBC # FLD: 18.9 % — HIGH (ref 10.3–14.5)
SODIUM SERPL-SCNC: 143 MMOL/L — SIGNIFICANT CHANGE UP (ref 135–145)
SPECIMEN SOURCE: SIGNIFICANT CHANGE UP
SPECIMEN SOURCE: SIGNIFICANT CHANGE UP
WBC # BLD: 0.3 K/UL — CRITICAL LOW (ref 3.8–10.5)
WBC # FLD AUTO: 0.3 K/UL — CRITICAL LOW (ref 3.8–10.5)

## 2019-09-03 PROCEDURE — 93010 ELECTROCARDIOGRAM REPORT: CPT

## 2019-09-03 RX ORDER — DILTIAZEM HCL 120 MG
5 CAPSULE, EXT RELEASE 24 HR ORAL ONCE
Refills: 0 | Status: COMPLETED | OUTPATIENT
Start: 2019-09-03 | End: 2019-09-03

## 2019-09-03 RX ORDER — SODIUM CHLORIDE 9 MG/ML
1000 INJECTION, SOLUTION INTRAVENOUS
Refills: 0 | Status: DISCONTINUED | OUTPATIENT
Start: 2019-09-03 | End: 2019-09-03

## 2019-09-03 RX ORDER — DILTIAZEM HCL 120 MG
5 CAPSULE, EXT RELEASE 24 HR ORAL ONCE
Refills: 0 | Status: DISCONTINUED | OUTPATIENT
Start: 2019-09-03 | End: 2019-09-03

## 2019-09-03 RX ORDER — DILTIAZEM HCL 120 MG
30 CAPSULE, EXT RELEASE 24 HR ORAL EVERY 6 HOURS
Refills: 0 | Status: DISCONTINUED | OUTPATIENT
Start: 2019-09-03 | End: 2019-09-04

## 2019-09-03 RX ADMIN — OXYCODONE AND ACETAMINOPHEN 1 TABLET(S): 5; 325 TABLET ORAL at 16:10

## 2019-09-03 RX ADMIN — Medication 1 SPRAY(S): at 12:07

## 2019-09-03 RX ADMIN — PANTOPRAZOLE SODIUM 40 MILLIGRAM(S): 20 TABLET, DELAYED RELEASE ORAL at 09:41

## 2019-09-03 RX ADMIN — Medication 125 MILLIGRAM(S): at 23:22

## 2019-09-03 RX ADMIN — Medication 0.5 MILLIGRAM(S): at 08:43

## 2019-09-03 RX ADMIN — Medication 5 MILLIGRAM(S): at 23:22

## 2019-09-03 RX ADMIN — Medication 480 MICROGRAM(S): at 13:03

## 2019-09-03 RX ADMIN — Medication 5 MILLIGRAM(S): at 23:57

## 2019-09-03 RX ADMIN — Medication 30 MILLIGRAM(S): at 12:08

## 2019-09-03 RX ADMIN — OXYCODONE AND ACETAMINOPHEN 1 TABLET(S): 5; 325 TABLET ORAL at 17:16

## 2019-09-03 RX ADMIN — Medication 125 MILLIGRAM(S): at 18:32

## 2019-09-03 RX ADMIN — OXYCODONE AND ACETAMINOPHEN 1 TABLET(S): 5; 325 TABLET ORAL at 04:48

## 2019-09-03 RX ADMIN — CEFEPIME 1000 MILLIGRAM(S): 1 INJECTION, POWDER, FOR SOLUTION INTRAMUSCULAR; INTRAVENOUS at 12:08

## 2019-09-03 RX ADMIN — METHOCARBAMOL 500 MILLIGRAM(S): 500 TABLET, FILM COATED ORAL at 00:23

## 2019-09-03 RX ADMIN — Medication 0.5 MILLIGRAM(S): at 20:11

## 2019-09-03 RX ADMIN — Medication 30 MILLIGRAM(S): at 18:31

## 2019-09-03 RX ADMIN — Medication 30 MILLIGRAM(S): at 23:03

## 2019-09-03 RX ADMIN — PANTOPRAZOLE SODIUM 40 MILLIGRAM(S): 20 TABLET, DELAYED RELEASE ORAL at 18:32

## 2019-09-03 RX ADMIN — Medication 30 MILLIGRAM(S): at 05:57

## 2019-09-03 RX ADMIN — Medication 125 MILLIGRAM(S): at 02:00

## 2019-09-03 RX ADMIN — Medication 125 MILLIGRAM(S): at 09:41

## 2019-09-03 RX ADMIN — FINASTERIDE 5 MILLIGRAM(S): 5 TABLET, FILM COATED ORAL at 12:09

## 2019-09-03 RX ADMIN — Medication 125 MILLIGRAM(S): at 14:21

## 2019-09-03 NOTE — PROGRESS NOTE ADULT - ASSESSMENT
Assessment/Plan  84 y.o. Male with PMHx of ESRD on HD (MWF), HTN, RA on MTX and steroids, R AKA, Hx of severe CDI and megacolon, Severe PAD s/p LLE endarterectomy complicated by left groin infections s/p Tx and muscle flap, anemia of chronic dx s/p transfusions in the past, COPD/SHAYY, diastolic CHF, GERD, Gout, HLD, recent Dx of NSCLC not treated yet and planned for XRT this month sent from SNF due to fever up to 104F and black tarry stools this am as well as dry blood in mouth. Pt was lethargic day prior, c/o lower back pain. Found to have coagulopathy with INR>8, guaiac +, severe neutropenia, RUL/RLL infiltrates, increased in size RML mass. In ED pt was febrile, tachycardic and hypotensive, but rejected by intensivist for admission to MICU.  At the time of evaluation pt is more alert, shivering, c/o lower back pain with slightly improved BP.    #Sepsis due to right sided PNA likely with gram negative organisms in the settings of immunosuppression on methotrexate, steroids and progressive NSCLC of RML  #Gram-negative bacteremia  F/u blood c/s- GNR  ID eval appreciated, further IV abx per ID  Oncology eval    #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  s/p methotrexate for inflammatory Arthritis   Oncology eval  F/u counts  - supportive transfusions of PRBC and platelets   - filgrastim     #Coagulopathy  with INR of 8  - reversed with IV Vit K and KCentra with repeat INR 2   - f/u repeat INR 1.7     #GIB in the settings of coagulopathy  #Anemia acute blood loss on chronic disease  S/p PRBC transfusion  Monitor   GI eval appreciated  Diet resumed    #Afib on coumadin  - warfarin held   - CCB - held due to hypotension  - AC services    #ESRD on HD  #Hyperkalemia- resolved  Renal f/u appreciated  Cont HD per renal    #Gout - do not stop allopurinol    #COPD/SHAYY - cont current meds    #HFpEF - monitor for fluid overload    #No VTE proph due to GIB    #Dispo- cont SDU care. D/w pt and daughter at bedside.

## 2019-09-03 NOTE — CHART NOTE - NSCHARTNOTEFT_GEN_A_CORE
84 y.o. Male with PMHx of ESRD on HD (MWF), HTN, RA on MTX and steroids, R AKA, Hx of severe CDI and megacolon,  Severe PAD s/p LLE endarterectomy complicated by left groin infections s/p Tx and muscle flap, recent Dx of NSCLC sent from [facility] due to fever up to 104F and black tarry stools.   Found to have coagulopathy with INR>8, guaiac +, severe neutropenia, RUL/RLL infiltrates, increased in size RML mass.  There is a history of AFIB controlled with Cardizem 240mg/day. Due to hypotension and GI bleed the anticoagulant was held  This after a 5 mg dose of IV Cardizem was given on Cardizem 30mg q6h this am.     Now patient in A-FIB with  b/p 104 /69 . asymptomatic   Cardizem 5mg IVP ordered   Monitor Ventricular rate   Consider increase Cardizem ot 60mg q6h in am. 84 y.o. Male with PMHx of ESRD on HD (MWF), HTN, RA on MTX and steroids, R AKA, Hx of severe CDI and megacolon,  Severe PAD s/p LLE endarterectomy complicated by left groin infections s/p Tx and muscle flap, recent Dx of NSCLC sent from [facility] due to fever up to 104F and black tarry stools.   Found to have coagulopathy with INR>8, guaiac +, severe neutropenia, RUL/RLL infiltrates, increased in size RML mass.    There is a history of AFIB controlled with Cardizem 240mg/day. Due to hypotension and GI bleed the anticoagulant was held  Received  5 mg dose of IV Cardizem @ 0400 am and was started on Cardizem 30mg q6h      Now patient in A-FIB with  b/p 104 /69 . asymptomatic   Cardizem 5mg IVP ordered   Monitor Ventricular rate   Consider increase Cardizem ot 60mg q6h in am. ( patient was on 240mgs/day at home)

## 2019-09-03 NOTE — PROGRESS NOTE ADULT - ASSESSMENT
85 yo w ESRD on HD ( MWF) , HTN, CDI/megalocolon hx , PAD w Right AKA, recent dx of NSLC not yet treated - was for planned radiation therapy after rehab completed at [facility], Now sent in c/o weakness, w fever 104, black tarry stools and blood clots in his mouth per his daughter. In ED hb was 7-->6's w hypotension SBP 70's and hyperkalemia K 6.1. Dtr reports this week pt required extra HD/PUF due to fluid overload and hypotension.      ESRD on HD w Hyperkalemia    in setting of  GIB /blood loss s/p PRBC - 2 units    arrange for regular HD today w PRBC x 1 unit   Uf as bp allows- 2 L today         Anemia w coumadin coagulopathy /severe neutropenia/pancytopenia   while on MTX for RA   PRBC at HD as above   correct INR per Medicine    GI    await heme - started on filgrastim   DC allopurinol    DC MTX      Fevers w neutropenia w GNR bactaremia    -  Iv abx per ID      d/w HD RN   d/w SICU RN      Thank you for the courtesy of this consult. We will follow this patient with you.   Management is subject to change if new information becomes available or patient condition changes.    9/3  ESRD  HCAP  E coli sepsis most likely bowel origin  ID input noted  s/p HD 9/2  No need for hd today, scheduled for am

## 2019-09-03 NOTE — PROGRESS NOTE ADULT - ASSESSMENT
84 y.o. Male with PMHx of ESRD on HD (MWF), HTN, RA on MTX and steroids, R AKA, Hx of severe CDI and megacolon, Severe PAD s/p LLE endarterectomy complicated by left groin infections s/p Tx and muscle flap, recent Dx of NSCLC not treated yet and planned for XRT this month admitted on 9/1 from snf for evaluation of fever to 104 and black tarry stools as well as dry blood in mouth; patient had been very lethargic over preceding 24 hours; he has complaints of lower back pain. Is tremulous awake, alert; history per family at bedside and medical record. Upon admission had elevated INR, neutropenia and extremely low hemoglobin.     1. Patient admitted with GI bleeding, neutropenia, coagulopathy and profound anemia. Also noted with pneumonia which will treat as health care associated pneumonia given that patient admitted from snf  - patient at risk for gram negative rods and other resistant bacteria --- found to have E coli in blood cultures  - oxygen and nebs as needed   - serial cbc and monitor temperature   - iv hydration and supportive care   - reviewed prior medical records to evaluate for resistant or atypical pathogens ---- had ESBL Klebsiella pneumoniae in past, this admission has E coli  - day #2 cefepime  - tolerating antibiotics without rashes or side effects   - Monitor closely in view of history of penicillin allergy   - will add vancomycin to treat resistant bacteria but intermittently dosed given renal function  -  po vancomycin to prevent cdiff  - steroids per medicine   - given neutropenic fever patient at risk for atypical infections, monitor for this and will be on cefepime; most likely E coli in blood is from bowel translocation in setting of neutropenia  - GI evaluation for GI Bleeding  - dialysis per schedule  - lung mass to be addressed at some point, the pneumonia most likely has component of post obstructive pneumonia as well  2. other issues: per medicine

## 2019-09-03 NOTE — CHART NOTE - NSCHARTNOTEFT_GEN_A_CORE
call to see pt for chest pain   Pt seen supine in bed c/o R sided chest pain; Denies palpitation/SOB/HA/dizziness/abd pain/n/v/d/f/c; per RN pt c/o of R shoulder pain with movement shortly before    Focal assessment  General: NAD  Resp: even and unlabored breathing, decrease breath sounds  CV: R anterior chest wall focal tendernes to 3-4 ICS 7/10  MSK: RUE AROM, AVF +bruit/thril, mild swelling   Neuro: A&Ox3    Vital Signs Last 24 Hrs  T(C): 37.5 (02 Sep 2019 21:28), Max: 37.5 (02 Sep 2019 21:28)  T(F): 99.5 (02 Sep 2019 21:28), Max: 99.5 (02 Sep 2019 21:28)  HR: 101 (03 Sep 2019 03:00) (67 - 101)  BP: 121/42 (03 Sep 2019 03:00) (110/51 - 150/43)  BP(mean): 66 (03 Sep 2019 03:00) (65 - 87)  RR: 20 (03 Sep 2019 03:00) (12 - 22)  SpO2: 96% (03 Sep 2019 03:00) (96% - 100%)    A/P  84 year old Man admitted on 9/1 with sepsis/hypotension/R sided PNA now with CP  reproducible CP likely musculoskeletal  percocet PRN  monitor

## 2019-09-03 NOTE — PROGRESS NOTE ADULT - ASSESSMENT
84 y.o. Male with PMHx of ESRD on HD (MWF), HTN, RA on MTX and steroids, R AKA, Hx of severe CDI and megacolon, Severe PAD s/p LLE endarterectomy complicated by left groin infections s/p Tx and muscle flap, recent Dx of NSCLC in 5/2019  now admitted for Gram negative Neutropenic Sepsis compliated by coagulopathy and multifactorial anemia secondary to blood loss.   Suspect MTX induced myelosuppression  Patient has undergone HD on two consecutive days   MTX level 0.06 if persists after next HD session will discuss with nephro regarding possibility of high flux   will continue with supportive transfusion:   pRBCs if Hb less than 8   Platelets < 10 or active bleeding   Given gram negative bacteremia  continue with Filgrastim daily   CT suggestive of POD yet unclear if cutaneous suspicous lesions are actually disease, will require biopsy of lesions which was discussed with patient and daughter.

## 2019-09-04 LAB
ANION GAP SERPL CALC-SCNC: 14 MMOL/L — SIGNIFICANT CHANGE UP (ref 5–17)
BUN SERPL-MCNC: 72 MG/DL — HIGH (ref 7–23)
CALCIUM SERPL-MCNC: 7.7 MG/DL — LOW (ref 8.5–10.1)
CHLORIDE SERPL-SCNC: 104 MMOL/L — SIGNIFICANT CHANGE UP (ref 96–108)
CO2 SERPL-SCNC: 21 MMOL/L — LOW (ref 22–31)
CREAT SERPL-MCNC: 3.22 MG/DL — HIGH (ref 0.5–1.3)
GLUCOSE SERPL-MCNC: 57 MG/DL — LOW (ref 70–99)
HCT VFR BLD CALC: 27.6 % — LOW (ref 39–50)
HGB BLD-MCNC: 8.6 G/DL — LOW (ref 13–17)
INR BLD: 2.62 RATIO — HIGH (ref 0.88–1.16)
MCHC RBC-ENTMCNC: 28.5 PG — SIGNIFICANT CHANGE UP (ref 27–34)
MCHC RBC-ENTMCNC: 31.2 GM/DL — LOW (ref 32–36)
MCV RBC AUTO: 91.4 FL — SIGNIFICANT CHANGE UP (ref 80–100)
NRBC # BLD: SIGNIFICANT CHANGE UP /100 WBCS (ref 0–0)
OB PNL STL: POSITIVE
PLATELET # BLD AUTO: 11 K/UL — CRITICAL LOW (ref 150–400)
POTASSIUM SERPL-MCNC: 3.9 MMOL/L — SIGNIFICANT CHANGE UP (ref 3.5–5.3)
POTASSIUM SERPL-SCNC: 3.9 MMOL/L — SIGNIFICANT CHANGE UP (ref 3.5–5.3)
PROTHROM AB SERPL-ACNC: 30 SEC — HIGH (ref 10–12.9)
RBC # BLD: 3.02 M/UL — LOW (ref 4.2–5.8)
RBC # FLD: 18.6 % — HIGH (ref 10.3–14.5)
SODIUM SERPL-SCNC: 139 MMOL/L — SIGNIFICANT CHANGE UP (ref 135–145)
WBC # BLD: 0.29 K/UL — CRITICAL LOW (ref 3.8–10.5)
WBC # FLD AUTO: 0.29 K/UL — CRITICAL LOW (ref 3.8–10.5)

## 2019-09-04 PROCEDURE — 99223 1ST HOSP IP/OBS HIGH 75: CPT

## 2019-09-04 RX ORDER — NYSTATIN 500MM UNIT
500000 POWDER (EA) MISCELLANEOUS EVERY 6 HOURS
Refills: 0 | Status: DISCONTINUED | OUTPATIENT
Start: 2019-09-04 | End: 2019-09-09

## 2019-09-04 RX ORDER — DILTIAZEM HCL 120 MG
60 CAPSULE, EXT RELEASE 24 HR ORAL EVERY 6 HOURS
Refills: 0 | Status: DISCONTINUED | OUTPATIENT
Start: 2019-09-04 | End: 2019-09-12

## 2019-09-04 RX ORDER — DILTIAZEM HCL 120 MG
30 CAPSULE, EXT RELEASE 24 HR ORAL ONCE
Refills: 0 | Status: COMPLETED | OUTPATIENT
Start: 2019-09-04 | End: 2019-09-04

## 2019-09-04 RX ORDER — DIGOXIN 250 MCG
0.25 TABLET ORAL ONCE
Refills: 0 | Status: COMPLETED | OUTPATIENT
Start: 2019-09-04 | End: 2019-09-04

## 2019-09-04 RX ADMIN — Medication 125 MILLIGRAM(S): at 18:45

## 2019-09-04 RX ADMIN — Medication 480 MICROGRAM(S): at 18:44

## 2019-09-04 RX ADMIN — Medication 125 MILLIGRAM(S): at 23:18

## 2019-09-04 RX ADMIN — OXYCODONE AND ACETAMINOPHEN 1 TABLET(S): 5; 325 TABLET ORAL at 15:05

## 2019-09-04 RX ADMIN — Medication 0.5 MILLIGRAM(S): at 07:46

## 2019-09-04 RX ADMIN — CEFEPIME 1000 MILLIGRAM(S): 1 INJECTION, POWDER, FOR SOLUTION INTRAMUSCULAR; INTRAVENOUS at 16:39

## 2019-09-04 RX ADMIN — Medication 125 MILLIGRAM(S): at 05:35

## 2019-09-04 RX ADMIN — Medication 0.5 MILLIGRAM(S): at 21:03

## 2019-09-04 RX ADMIN — Medication 1 SPRAY(S): at 16:38

## 2019-09-04 RX ADMIN — Medication 30 MILLIGRAM(S): at 00:06

## 2019-09-04 RX ADMIN — ERYTHROPOIETIN 10000 UNIT(S): 10000 INJECTION, SOLUTION INTRAVENOUS; SUBCUTANEOUS at 14:20

## 2019-09-04 RX ADMIN — Medication 30 MILLIGRAM(S): at 05:32

## 2019-09-04 RX ADMIN — OXYCODONE AND ACETAMINOPHEN 1 TABLET(S): 5; 325 TABLET ORAL at 14:37

## 2019-09-04 RX ADMIN — OXYCODONE AND ACETAMINOPHEN 1 TABLET(S): 5; 325 TABLET ORAL at 23:19

## 2019-09-04 RX ADMIN — FINASTERIDE 5 MILLIGRAM(S): 5 TABLET, FILM COATED ORAL at 16:39

## 2019-09-04 RX ADMIN — Medication 30 MILLIGRAM(S): at 12:38

## 2019-09-04 RX ADMIN — Medication 500000 UNIT(S): at 18:45

## 2019-09-04 RX ADMIN — PANTOPRAZOLE SODIUM 40 MILLIGRAM(S): 20 TABLET, DELAYED RELEASE ORAL at 05:32

## 2019-09-04 RX ADMIN — PANTOPRAZOLE SODIUM 40 MILLIGRAM(S): 20 TABLET, DELAYED RELEASE ORAL at 18:45

## 2019-09-04 RX ADMIN — Medication 0.25 MILLIGRAM(S): at 16:18

## 2019-09-04 RX ADMIN — Medication 60 MILLIGRAM(S): at 23:18

## 2019-09-04 RX ADMIN — Medication 500000 UNIT(S): at 23:18

## 2019-09-04 RX ADMIN — Medication 60 MILLIGRAM(S): at 18:44

## 2019-09-04 NOTE — CONSULT NOTE ADULT - ASSESSMENT
This is a 84 y.o. Male with PMHx of GISELLA Miller on coumadin [id number] Score: 5, ESRD on HD (MWF), HTN, RA on MTX and steroids, R AKA, Hx of severe CDI and megacolon, Severe PAD s/p LLE endarterectomy complicated by left groin infections s/p Tx and muscle flap, recent Dx of NSCLC not treated yet and planned for XRT this month sent from SNF due to fever up to 104F and black tarry stools this am as well as dry blood in mouth.Pt came into ED at  on 9-1-19, was lethargic day prior, c/o lower back pain. Found to have coagulopathy with INR>8, guaiac +, severe neutropenia, RUL/RLL infiltrates, increased in size RML mass. In ED pt was febrile, tachycardic and hypotensive, but rejected by intensivist for admission to MICU. Pt admitted to SICU.  PT'S BLD RISK 15.5.  Pt has both high bleed and high thrombosis risks.    9-4-10  Attempted to call Dr Ki Yin [phone] Hematologist  oncologist l/m for him to call back to discuss plan.  I spoke with Dr Bach and informed her pt is not a candidates for anticoagulation for now will monitor plts,, H/H.    Plan: hold all pharmacologic anticoagulation for now until pt is hemodynamically stable noted plts 11,000 and hgb 8.6.  Daily cbc/bmp PT/INR.  NOTED INR  2.62 TODAY AFTER K CENTRA ON 9-1-19.  INR LABILE  INR on admission 8.0.  :ONEIL carrion  :thanks for consult will f/u

## 2019-09-04 NOTE — PHYSICAL THERAPY INITIAL EVALUATION ADULT - ADDITIONAL COMMENTS
Pt is a R AKA, at baseline, he does not use his prothesis due to it cutting into him as per his dgt. He uses a slant board to transfer to his .

## 2019-09-04 NOTE — PHYSICAL THERAPY INITIAL EVALUATION ADULT - MODALITIES TREATMENT COMMENTS
Patient performed minimal therapeutic exercises, due to somnolence.  Patient left in chair as found, pillows under B UEs, chair reclined, All lines intact. RN informed of session/status.

## 2019-09-04 NOTE — PROGRESS NOTE ADULT - ASSESSMENT
Assessment/Plan  84 y.o. Male with PMHx of ESRD on HD (MWF), HTN, RA on MTX and steroids, R AKA, Hx of severe CDI and megacolon, Severe PAD s/p LLE endarterectomy complicated by left groin infections s/p Tx and muscle flap, anemia of chronic dx s/p transfusions in the past, COPD/SHAYY, diastolic CHF, GERD, Gout, HLD, recent Dx of NSCLC not treated yet and planned for XRT this month sent from SNF due to fever up to 104F and black tarry stools this am as well as dry blood in mouth. Pt was lethargic day prior, c/o lower back pain. Found to have coagulopathy with INR>8, guaiac +, severe neutropenia, RUL/RLL infiltrates, increased in size RML mass. In ED pt was febrile, tachycardic and hypotensive, but rejected by intensivist for admission to MICU.  At the time of evaluation pt is more alert, shivering, c/o lower back pain with slightly improved BP.    #Sepsis due to right sided PNA likely with gram negative organisms in the settings of immunosuppression on methotrexate, steroids and progressive NSCLC of RML  #Gram-negative bacteremia  F/u blood c/s- GNR  ID eval appreciated, further IV abx per ID  Oncology eval    #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  s/p methotrexate for inflammatory Arthritis   Oncology eval  F/u counts  - supportive transfusions of PRBC and platelets   - filgrastim    # Oral thrush  - start nystatin  - monitor     #Coagulopathy  with INR of 8  - reversed with IV Vit K and KCentra with repeat INR 2   - f/u repeat INR 1.7 --> 2.6    #GIB in the settings of coagulopathy  #Anemia acute blood loss on chronic disease  S/p PRBC transfusion  Monitor   GI eval appreciated  Diet resumed    #Afib on coumadin  - warfarin held   - CCB - held due to hypotension  - AC services    #ESRD on HD  #Hyperkalemia- resolved  Renal f/u appreciated  Cont HD per renal    #Gout - do not stop allopurinol    #COPD/SHAYY - cont current meds    #HFpEF - monitor for fluid overload    #No VTE proph due to GIB    #Dispo- cont SDU care. D/w pt and daughter at bedside.

## 2019-09-04 NOTE — PHYSICAL THERAPY INITIAL EVALUATION ADULT - GENERAL OBSERVATIONS, REHAB EVAL
Patient received out of bed in recliner chair in SDU, +[place] sling under patient, +ICU monitors, +O2@4L via nc  Denied pain at rest. Daughter, Eileen, in at end of session.

## 2019-09-04 NOTE — CONSULT NOTE ADULT - SUBJECTIVE AND OBJECTIVE BOX
HPI:  84 y.o. Male with PMHx of ESRD on HD (MWF), HTN, RA on MTX and steroids, R AKA, Hx of severe CDI and megacolon, Severe PAD s/p LLE endarterectomy complicated by left groin infections s/p Tx and muscle flap, recent Dx of NSCLC not treated yet and planned for XRT this month sent from SNF due to fever up to 104F and black tarry stools this am as well as dry blood in mouth. Pt was lethargic day prior, c/o lower back pain. Found to have coagulopathy with INR>8, guaiac +, severe neutropenia, RUL/RLL infiltrates, increased in size RML mass. In ED pt was febrile, tachycardic and hypotensive, but rejected by intensivist for admission to MICU.  At the time of evaluation pt is more alert, shivering, c/o lower back pain with slightly improved BP. (01 Sep 2019 15:09)      Patient is a 84y old  Male who presents with a chief complaint of fever, tarry stools (04 Sep 2019 09:01)      Consulted by Dr. Genie England  for VTE prophylaxis, risk stratification, and anticoagulation management.    PAST MEDICAL & SURGICAL HISTORY:  Rheumatoid arthritis  Above knee amputation of right lower extremity  Recurrent Clostridium difficile diarrhea  Diastolic CHF  Peripheral vascular disease  Afib  Anemia  CKD (chronic kidney disease)  COPD (chronic obstructive pulmonary disease)  SHAYY (obstructive sleep apnea)  Sepsis, due to unspecified organism: 2/2 poorly healing wounds b/l  Dyspepsia: On moderate exertion.  Sleep apnea, obstructive: Requires home 02 therapy, and treatment with BIPAP  Atelectasis  Pleural effusion, bilateral  Respiratory failure  Peripheral edema  CRI (chronic renal insufficiency)  Gout  Benign prostatic hypertrophy  Spinal stenosis  Hypercholesterolemia  GERD (gastroesophageal reflux disease)  CAD (coronary artery disease)  Hypertension  S/P angioplasty with stent  Cataract of left eye  Prostate: Surgery green light procedure.  S/P rotator cuff surgery: Right  S/P angioplasty    IMPROVE VTE Individual Risk Assessment    RISK                                                                Points    [  ] Previous VTE                                                  3    [  ] Thrombophilia                                               2    [ x ] Lower limb paralysis                                      2        (unable to hold up >15 seconds)      [  ] Current Cancer                                              2         (within 6 months)    [x  ] Immobilization > 24 hrs                                1    [x  ] ICU/CCU stay > 24 hours                              1    [x  ] Age > 60                                                      1    IMPROVE VTE Score __5_______    IMPROVE Score 0-1: Low Risk, No VTE prophylaxis required for most patients, encourage ambulation.   IMPROVE Score 2-3: At risk, pharmacologic VTE prophylaxis is indicated for most patients (in the absence of a contraindication)  IMPROVE Score > or = 4: High Risk, pharmacologic VTE prophylaxis is indicated for most patients (in the absence of a contraindication)    [id number] Score: 5    IMPROVE Bleeding Risk Score15.5    Falls Risk:   High (x  )  Mod (  )  Low (  )    crcl; 17.7    9-4-19 pt seen in SICU HD with daughter present.  Discussed his anticoagulation on hold due high bleed risk and low plts.    Vital Signs Last 24 Hrs  T(C): 36.6 (04 Sep 2019 11:30), Max: 38 (03 Sep 2019 20:41)  T(F): 97.8 (04 Sep 2019 11:30), Max: 100.4 (03 Sep 2019 20:41)  HR: 131 (04 Sep 2019 14:55) (90 - 131)  BP: 129/52 (04 Sep 2019 14:55) (96/58 - 149/45)  BP(mean): 74 (04 Sep 2019 11:00) (66 - 90)  RR: 29 (04 Sep 2019 14:55) (16 - 29)  SpO2: 92% (04 Sep 2019 14:55) (92% - 100%)  FAMILY HISTORY:  Family history of colorectal cancer  Family history of diabetes mellitus (Sibling)  Family history of hypertension    Denies any personal or familial history of clotting or bleeding disorders.    Allergies    Zosyn (Rash)    Intolerances        REVIEW OF SYSTEMS    (  )Fever	     (  )Constipation	(  )SOB				(  )Headache	(  )Dysuria  (  )Chills	     (  )Melena	(  )Dyspnea present on exertion	                    (  )Dizziness                    (  )Polyuria  (  )Nausea	     (  )Hematochezia	(  )Cough			                    (  )Syncope   	(  )Hematuria  (  )Vomiting    (  )Chest Pain	(  )Wheezing			( X )Weakness  (  )Diarrhea     (  )Palpitations	(  )Anorexia			(X  )Myalgia   (X) PAIN TO BUTTOCKS.    Pertinent positives in HPI and daily subjective.  All other ROS negative.      PHYSICAL EXAM:    Constitutional: Appears Well    Neurological: A& O x 2     Skin: Warm    Respiratory and Thorax: normal effort; Breath sounds: normal; No rales/wheezing/rhonchi  	  Cardiovascular: S1, S2, regular, NMBR	    Gastrointestinal: BS + x 4Q, nontender	    Genitourinary:  Bladder nondistended, nontender    Musculoskeletal:   General Right:   no muscle/joint tenderness,   normal tone, no joint swelling,   ROM: limited	    General Left:   no muscle/joint tenderness,   normal tone, no joint swelling,   ROM: limited      Lower extrems:   Right: no calf tenderness              negative zoltan's sign               + pedal pulses    Left:   no calf tenderness              negative zoltan's sign               + pedal pulses                          8.6    0.29  )-----------( 11       ( 04 Sep 2019 06:39 )             27.6       09-04    139  |  104  |  72<H>  ----------------------------<  57<L>  3.9   |  21<L>  |  3.22<H>    Ca    7.7<L>      04 Sep 2019 06:39  Mg     1.9     09-03        PT/INR - ( 04 Sep 2019 06:39 )   PT: 30.0 sec;   INR: 2.62 ratio         				    MEDICATIONS  (STANDING):  buDESOnide    Inhalation Suspension 0.5 milliGRAM(s) Inhalation two times a day  cefepime  Injectable. 1000 milliGRAM(s) IV Push daily  diltiazem    Tablet 30 milliGRAM(s) Oral every 6 hours  epoetin nelly Injectable 94263 Unit(s) IV Push <User Schedule>  filgrastim-sndz Injectable 480 MICROGram(s) SubCutaneous daily  finasteride 5 milliGRAM(s) Oral daily  fluticasone propionate 50 MICROgram(s)/spray Nasal Spray 1 Spray(s) Both Nostrils daily  nystatin    Suspension 297578 Unit(s) Swish and Swallow every 6 hours  pantoprazole  Injectable 40 milliGRAM(s) IV Push two times a day  vancomycin    Solution 125 milliGRAM(s) Oral every 6 hours          DVT Prophylaxis:  LMWH                   (  )  Heparin SQ           (  )  Coumadin             (  )  Xarelto                  (  )  Eliquis                   (  )  Venodynes           (  )  Ambulation          (  )  UFH                       (  )  Contraindicated  (x  )  EC Aspirin             (  ) HPI:  84 y.o. Male with PMHx of ESRD on HD (MWF), HTN, RA on MTX and steroids, R AKA, Hx of severe CDI and megacolon, Severe PAD s/p LLE endarterectomy complicated by left groin infections s/p Tx and muscle flap, recent Dx of NSCLC not treated yet and planned for XRT this month sent from SNF due to fever up to 104F and black tarry stools this am as well as dry blood in mouth. Pt was lethargic day prior, c/o lower back pain. Found to have coagulopathy with INR>8, guaiac +, severe neutropenia, RUL/RLL infiltrates, increased in size RML mass. In ED pt was febrile, tachycardic and hypotensive, but rejected by intensivist for admission to MICU.  At the time of evaluation pt is more alert, shivering, c/o lower back pain with slightly improved BP. (01 Sep 2019 15:09)      Patient is a 84y old  Male who presents with a chief complaint of fever, tarry stools (04 Sep 2019 09:01)      Consulted by Dr. Genie England  for VTE prophylaxis, risk stratification, and anticoagulation management.    PAST MEDICAL & SURGICAL HISTORY:  Rheumatoid arthritis  Above knee amputation of right lower extremity  Recurrent Clostridium difficile diarrhea  Diastolic CHF  Peripheral vascular disease  Afib  Anemia  CKD (chronic kidney disease)  COPD (chronic obstructive pulmonary disease)  SHAYY (obstructive sleep apnea)  Sepsis, due to unspecified organism: 2/2 poorly healing wounds b/l  Dyspepsia: On moderate exertion.  Sleep apnea, obstructive: Requires home 02 therapy, and treatment with BIPAP  Atelectasis  Pleural effusion, bilateral  Respiratory failure  Peripheral edema  CRI (chronic renal insufficiency)  Gout  Benign prostatic hypertrophy  Spinal stenosis  Hypercholesterolemia  GERD (gastroesophageal reflux disease)  CAD (coronary artery disease)  Hypertension  S/P angioplasty with stent  Cataract of left eye  Prostate: Surgery green light procedure.  S/P rotator cuff surgery: Right  S/P angioplasty    IMPROVE VTE Individual Risk Assessment    RISK                                                                Points    [  ] Previous VTE                                                  3    [  ] Thrombophilia                                               2    [ x ] Lower limb paralysis                                      2        (unable to hold up >15 seconds)      [  ] Current Cancer                                              2         (within 6 months)    [x  ] Immobilization > 24 hrs                                1    [x  ] ICU/CCU stay > 24 hours                              1    [x  ] Age > 60                                                      1    IMPROVE VTE Score __5_______    IMPROVE Score 0-1: Low Risk, No VTE prophylaxis required for most patients, encourage ambulation.   IMPROVE Score 2-3: At risk, pharmacologic VTE prophylaxis is indicated for most patients (in the absence of a contraindication)  IMPROVE Score > or = 4: High Risk, pharmacologic VTE prophylaxis is indicated for most patients (in the absence of a contraindication)    [id number] Score: 5    IMPROVE Bleeding Risk Score15.5    Falls Risk:   High (x  )  Mod (  )  Low (  )    crcl; 17.7    9-4-19 pt seen in SICU HD with daughter present.  Discussed his anticoagulation on hold due high bleed risk and low plts.    Vital Signs Last 24 Hrs  T(C): 36.6 (04 Sep 2019 11:30), Max: 38 (03 Sep 2019 20:41)  T(F): 97.8 (04 Sep 2019 11:30), Max: 100.4 (03 Sep 2019 20:41)  HR: 131 (04 Sep 2019 14:55) (90 - 131)  BP: 129/52 (04 Sep 2019 14:55) (96/58 - 149/45)  BP(mean): 74 (04 Sep 2019 11:00) (66 - 90)  RR: 29 (04 Sep 2019 14:55) (16 - 29)  SpO2: 92% (04 Sep 2019 14:55) (92% - 100%)  FAMILY HISTORY:  Family history of colorectal cancer  Family history of diabetes mellitus (Sibling)  Family history of hypertension    Denies any personal or familial history of clotting or bleeding disorders.    Allergies    Zosyn (Rash)    Intolerances        REVIEW OF SYSTEMS    (  )Fever	     (  )Constipation	(  )SOB				(  )Headache	(  )Dysuria  (  )Chills	     (  )Melena	(  )Dyspnea present on exertion	                    (  )Dizziness                    (  )Polyuria  (  )Nausea	     (  )Hematochezia	(  )Cough			                    (  )Syncope   	(  )Hematuria  (  )Vomiting    (  )Chest Pain	(  )Wheezing			( X )Weakness  (  )Diarrhea     (  )Palpitations	(  )Anorexia			(X  )Myalgia   (X) PAIN TO BUTTOCKS.    Pertinent positives in HPI and daily subjective.  All other ROS negative.      PHYSICAL EXAM:    Constitutional: Appears Well    Neurological: A& O x 2     Skin: Warm    Respiratory and Thorax: normal effort; Breath sounds: normal; No rales/wheezing/rhonchi  	  Cardiovascular: S1, S2, regular, NMBR	    Gastrointestinal: BS + x 4Q, nontender	    Genitourinary:  Bladder nondistended, nontender    Musculoskeletal:   General Right:   no muscle/joint tenderness,   normal tone, no joint swelling,   ROM: limited	    General Left:   no muscle/joint tenderness,   normal tone, no joint swelling,   ROM: limited      Lower extrems:   Right: no calf tenderness              negative zoltan's sign               + pedal pulses    Left:   no calf tenderness              negative zoltan's sign               + pedal pulses                          8.6    0.29  )-----------( 11       ( 04 Sep 2019 06:39 )             27.6       09-04    139  |  104  |  72<H>  ----------------------------<  57<L>  3.9   |  21<L>  |  3.22<H>    Ca    7.7<L>      04 Sep 2019 06:39  Mg     1.9     09-03        PT/INR - ( 04 Sep 2019 06:39 )   PT: 30.0 sec;   INR: 2.62 ratio         				    MEDICATIONS  (STANDING):  buDESOnide    Inhalation Suspension 0.5 milliGRAM(s) Inhalation two times a day  cefepime  Injectable. 1000 milliGRAM(s) IV Push daily  diltiazem    Tablet 30 milliGRAM(s) Oral every 6 hours  epoetin nelly Injectable 64363 Unit(s) IV Push <User Schedule>  filgrastim-sndz Injectable 480 MICROGram(s) SubCutaneous daily  finasteride 5 milliGRAM(s) Oral daily  fluticasone propionate 50 MICROgram(s)/spray Nasal Spray 1 Spray(s) Both Nostrils daily  nystatin    Suspension 005189 Unit(s) Swish and Swallow every 6 hours  pantoprazole  Injectable 40 milliGRAM(s) IV Push two times a day  vancomycin    Solution 125 milliGRAM(s) Oral every 6 hours   CT Chest No Cont (09.01.19 @ 12:11) >  IMPRESSION: Increasein size of the right middle lobe lung mass.    New opacities in the right upper lobe and right lower lobe likely on the   basis of pneumonia. Tumor infiltration not excluded.    Areas of atelectasis involving the left lower lobe and lingula now noted.    2 subcutaneous nodules left anterior chest wall one of which is new and   for which metastatic disease is in the differential diagnosis.    Distended colon containing stool and air.    Asymmetric wall thickening right lateral rectum. This could represent   focal proctitis, however, direct visualization is suggested when   clinically feasible.        DVT Prophylaxis:  LMWH                   (  )  Heparin SQ           (  )  Coumadin             (  )  Xarelto                  (  )  Eliquis                   (  )  Venodynes           (  )  Ambulation          (  )  UFH                       (  )  Contraindicated  (x  )  EC Aspirin             (  )

## 2019-09-04 NOTE — PHYSICAL THERAPY INITIAL EVALUATION ADULT - ACTIVE RANGE OF MOTION EXAMINATION, REHAB EVAL
bilateral upper extremity Active ROM was WFL (within functional limits)/B Shoulders to 90*. R stump mobile in minimal range seated.

## 2019-09-04 NOTE — PHYSICAL THERAPY INITIAL EVALUATION ADULT - DIAGNOSIS, PT EVAL
Sepsis due to right sided PNA, h/o R AKA, GIB, anemia Sepsis due to right sided PNA, h/o R AKA, GIB, anemia,  ESRD on HD (MW) at [facility]

## 2019-09-04 NOTE — PROGRESS NOTE ADULT - ASSESSMENT
84 y.o. Male with PMHx of ESRD on HD (MWF), HTN, RA on MTX and steroids, R AKA, Hx of severe CDI and megacolon, Severe PAD s/p LLE endarterectomy complicated by left groin infections s/p Tx and muscle flap, recent Dx of NSCLC in 5/2019  now admitted for Gram negative Neutropenic Sepsis compliated by coagulopathy and multifactorial anemia secondary to blood loss.     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   Given gram negative bacteremia  continue with Filgrastim daily     CT suggestive of POD yet unclear if cutaneous suspicous lesions are actually disease, will require biopsy of lesions which was discussed with patient and daughter.

## 2019-09-04 NOTE — PROGRESS NOTE ADULT - ASSESSMENT
84-year-old gentleman with multiple comorbid disorders    Pancytopenia, likely associated with myelosuppression.    He may have had a component of bleeding as well, however, difficult to ascertain severity of this. Apparently, he had some bleeding that was noted in his oral cavity however, hard to tell where that came from. At this time, it appears to be stable and at the time of evaluation of bleeding, he had an elevated INR and low platelet count.    Hematology notes appreciated.  Supportive measures for bleeding and control of INR.        Blood cells if hemoglobin is less than 8 and platelets supplement if patient is actively bleeding.    Gram-negative bacteremia, significant leukopenia. Would continue antibiotics.    I cannot rule out mild right-sided colitis but for now, we will monitor. He would be at high risk for endoscopic evaluation.  Discussed with daughter, Dominga  encourage PO intake

## 2019-09-04 NOTE — PHYSICAL THERAPY INITIAL EVALUATION ADULT - LEVEL OF INDEPENDENCE: GAIT, REHAB EVAL
[employer] Assistance [employer]ift, such as [place],, is recommended for OOB transfers for safe staff and patient handling.

## 2019-09-04 NOTE — PHYSICAL THERAPY INITIAL EVALUATION ADULT - PERTINENT HX OF CURRENT PROBLEM, REHAB EVAL
Patient is a 84y old  Male who presents with a chief complaint of fever, tarry stools, with multiple comorbid disorders, Pancytopenia, likely associated with myelosuppression.

## 2019-09-04 NOTE — PROGRESS NOTE ADULT - ASSESSMENT
85 yo w ESRD on HD ( MWF) , HTN, CDI/megalocolon hx , PAD w Right AKA, recent dx of NSLC not yet treated - was for planned radiation therapy after rehab completed at [facility], Now sent in c/o weakness, w fever 104, black tarry stools and blood clots in his mouth per his daughter. In ED hb was 7-->6's w hypotension SBP 70's and hyperkalemia K 6.1. Dtr reports this week pt required extra HD/PUF due to fluid overload and hypotension.      ESRD on HD w Hyperkalemia    in setting of  GIB /blood loss s/p PRBC - 2 units    arrange for regular HD today w PRBC x 1 unit   Uf as bp allows- 2 L today         Anemia w coumadin coagulopathy /severe neutropenia/pancytopenia   while on MTX for RA   PRBC at HD as above   correct INR per Medicine    GI    await heme - started on filgrastim   DC allopurinol    DC MTX      Fevers w neutropenia w GNR bactaremia    -  Iv abx per ID      d/w HD RN   d/w SICU RN      Thank you for the courtesy of this consult. We will follow this patient with you.   Management is subject to change if new information becomes available or patient condition changes.    9/3  ESRD  HCAP  E coli sepsis most likely bowel origin  ID input noted  s/p HD 9/2  No need for hd today, scheduled for am    9/4 SY  --ESRD : continue TIW HD.  --Sepsis : continue IV abtx and Po vanco per ID  --Pancytopenia : multifactorial with recent MTX therapy.    D/w Heme : Will use High Flux dialyzer in an attempt to increase MTX clearance. 85 yo w ESRD on HD ( MWF) , HTN, CDI/megalocolon hx , PAD w Right AKA, recent dx of NSLC not yet treated - was for planned radiation therapy after rehab completed at [facility], Now sent in c/o weakness, w fever 104, black tarry stools and blood clots in his mouth per his daughter. In ED hb was 7-->6's w hypotension SBP 70's and hyperkalemia K 6.1. Dtr reports this week pt required extra HD/PUF due to fluid overload and hypotension.      ESRD on HD w Hyperkalemia    in setting of  GIB /blood loss s/p PRBC - 2 units    arrange for regular HD today w PRBC x 1 unit   Uf as bp allows- 2 L today         Anemia w coumadin coagulopathy /severe neutropenia/pancytopenia   while on MTX for RA   PRBC at HD as above   correct INR per Medicine    GI    await heme - started on filgrastim   DC allopurinol    DC MTX      Fevers w neutropenia w GNR bactaremia    -  Iv abx per ID      d/w HD RN   d/w SICU RN      Thank you for the courtesy of this consult. We will follow this patient with you.   Management is subject to change if new information becomes available or patient condition changes.    9/3  ESRD  HCAP  E coli sepsis most likely bowel origin  ID input noted  s/p HD 9/2  No need for hd today, scheduled for am    9/4 SY  --ESRD : continue TIW HD.  --Sepsis : continue IV abtx and Po vanco per ID  --Pancytopenia : multifactorial with recent MTX therapy.    D/w Heme : Will use High Flux dialyzer in an attempt to increase MTX clearance.  --Nutrition: change to regular diet for now to maintain adequate po intake.

## 2019-09-05 LAB
ANION GAP SERPL CALC-SCNC: 12 MMOL/L — SIGNIFICANT CHANGE UP (ref 5–17)
BASOPHILS # BLD AUTO: 0 K/UL — SIGNIFICANT CHANGE UP (ref 0–0.2)
BASOPHILS NFR BLD AUTO: 0 % — SIGNIFICANT CHANGE UP (ref 0–2)
BUN SERPL-MCNC: 33 MG/DL — HIGH (ref 7–23)
CALCIUM SERPL-MCNC: 7.9 MG/DL — LOW (ref 8.5–10.1)
CHLORIDE SERPL-SCNC: 101 MMOL/L — SIGNIFICANT CHANGE UP (ref 96–108)
CO2 SERPL-SCNC: 27 MMOL/L — SIGNIFICANT CHANGE UP (ref 22–31)
CREAT SERPL-MCNC: 2.09 MG/DL — HIGH (ref 0.5–1.3)
EOSINOPHIL # BLD AUTO: 0.03 K/UL — SIGNIFICANT CHANGE UP (ref 0–0.5)
EOSINOPHIL NFR BLD AUTO: 10 % — HIGH (ref 0–6)
GLUCOSE SERPL-MCNC: 122 MG/DL — HIGH (ref 70–99)
HCT VFR BLD CALC: 28.4 % — LOW (ref 39–50)
HGB BLD-MCNC: 8.8 G/DL — LOW (ref 13–17)
INR BLD: 2.17 RATIO — HIGH (ref 0.88–1.16)
LYMPHOCYTES # BLD AUTO: 0.14 K/UL — LOW (ref 1–3.3)
LYMPHOCYTES # BLD AUTO: 41 % — SIGNIFICANT CHANGE UP (ref 13–44)
MCHC RBC-ENTMCNC: 28.5 PG — SIGNIFICANT CHANGE UP (ref 27–34)
MCHC RBC-ENTMCNC: 31 GM/DL — LOW (ref 32–36)
MCV RBC AUTO: 91.9 FL — SIGNIFICANT CHANGE UP (ref 80–100)
MONOCYTES # BLD AUTO: 0.05 K/UL — SIGNIFICANT CHANGE UP (ref 0–0.9)
MONOCYTES NFR BLD AUTO: 15 % — HIGH (ref 2–14)
MTX SERPL-SCNC: 0.05 UMOL/L — LOW (ref 0.5–5)
NEUTROPHILS # BLD AUTO: 0.11 K/UL — LOW (ref 1.8–7.4)
NEUTROPHILS NFR BLD AUTO: 34 % — LOW (ref 43–77)
NRBC # BLD: SIGNIFICANT CHANGE UP /100 WBCS (ref 0–0)
PLATELET # BLD AUTO: 28 K/UL — LOW (ref 150–400)
POTASSIUM SERPL-MCNC: 3.9 MMOL/L — SIGNIFICANT CHANGE UP (ref 3.5–5.3)
POTASSIUM SERPL-SCNC: 3.9 MMOL/L — SIGNIFICANT CHANGE UP (ref 3.5–5.3)
PROTHROM AB SERPL-ACNC: 24.7 SEC — HIGH (ref 10–12.9)
RBC # BLD: 3.09 M/UL — LOW (ref 4.2–5.8)
RBC # FLD: 18.2 % — HIGH (ref 10.3–14.5)
SODIUM SERPL-SCNC: 140 MMOL/L — SIGNIFICANT CHANGE UP (ref 135–145)
WBC # BLD: 0.33 K/UL — CRITICAL LOW (ref 3.8–10.5)
WBC # FLD AUTO: 0.33 K/UL — CRITICAL LOW (ref 3.8–10.5)

## 2019-09-05 PROCEDURE — 73030 X-RAY EXAM OF SHOULDER: CPT | Mod: 26,RT

## 2019-09-05 RX ORDER — ACETAMINOPHEN 500 MG
650 TABLET ORAL EVERY 12 HOURS
Refills: 0 | Status: DISCONTINUED | OUTPATIENT
Start: 2019-09-05 | End: 2019-09-12

## 2019-09-05 RX ORDER — LIDOCAINE 4 G/100G
1 CREAM TOPICAL DAILY
Refills: 0 | Status: DISCONTINUED | OUTPATIENT
Start: 2019-09-05 | End: 2019-09-16

## 2019-09-05 RX ADMIN — Medication 0.5 MILLIGRAM(S): at 21:02

## 2019-09-05 RX ADMIN — Medication 125 MILLIGRAM(S): at 18:53

## 2019-09-05 RX ADMIN — Medication 1 SPRAY(S): at 13:52

## 2019-09-05 RX ADMIN — Medication 125 MILLIGRAM(S): at 13:52

## 2019-09-05 RX ADMIN — FINASTERIDE 5 MILLIGRAM(S): 5 TABLET, FILM COATED ORAL at 13:51

## 2019-09-05 RX ADMIN — Medication 0.5 MILLIGRAM(S): at 09:10

## 2019-09-05 RX ADMIN — Medication 60 MILLIGRAM(S): at 05:41

## 2019-09-05 RX ADMIN — Medication 500000 UNIT(S): at 05:41

## 2019-09-05 RX ADMIN — METHOCARBAMOL 500 MILLIGRAM(S): 500 TABLET, FILM COATED ORAL at 07:50

## 2019-09-05 RX ADMIN — Medication 60 MILLIGRAM(S): at 18:52

## 2019-09-05 RX ADMIN — Medication 500000 UNIT(S): at 13:50

## 2019-09-05 RX ADMIN — PANTOPRAZOLE SODIUM 40 MILLIGRAM(S): 20 TABLET, DELAYED RELEASE ORAL at 18:56

## 2019-09-05 RX ADMIN — OXYCODONE AND ACETAMINOPHEN 1 TABLET(S): 5; 325 TABLET ORAL at 00:15

## 2019-09-05 RX ADMIN — CEFEPIME 1000 MILLIGRAM(S): 1 INJECTION, POWDER, FOR SOLUTION INTRAMUSCULAR; INTRAVENOUS at 13:53

## 2019-09-05 RX ADMIN — Medication 500000 UNIT(S): at 18:52

## 2019-09-05 RX ADMIN — PANTOPRAZOLE SODIUM 40 MILLIGRAM(S): 20 TABLET, DELAYED RELEASE ORAL at 05:41

## 2019-09-05 RX ADMIN — Medication 125 MILLIGRAM(S): at 05:41

## 2019-09-05 RX ADMIN — Medication 60 MILLIGRAM(S): at 13:51

## 2019-09-05 NOTE — PROGRESS NOTE ADULT - ASSESSMENT
84 y.o. Male with PMHx of ESRD on HD (MWF), HTN, RA on MTX and steroids, R AKA, Hx of severe CDI and megacolon, Severe PAD s/p LLE endarterectomy complicated by left groin infections s/p Tx and muscle flap, recent Dx of NSCLC not treated yet and planned for XRT this month admitted on 9/1 from [facility] for evaluation of fever to 104 and black tarry stools as well as dry blood in mouth; patient had been very lethargic over preceding 24 hours; he has complaints of lower back pain. Is tremulous awake, alert; history per family at bedside and medical record. Upon admission had elevated INR, neutropenia and extremely low hemoglobin.     1. Patient admitted with GI bleeding, neutropenia, coagulopathy and profound anemia. Also noted with pneumonia which will treat as health care associated pneumonia given that patient admitted from snf  - patient at risk for gram negative rods and other resistant bacteria --- found to have E coli in blood cultures  - oxygen and nebs as needed   - serial cbc and monitor temperature   - iv hydration and supportive care   - reviewed prior medical records to evaluate for resistant or atypical pathogens ---- had ESBL Klebsiella pneumoniae in past, this admission has E coli  - day #4 cefepime  - tolerating antibiotics without rashes or side effects   - Monitor closely in view of history of penicillin allergy   - will add vancomycin to treat resistant bacteria but intermittently dosed given renal function  -  po vancomycin to prevent cdiff  - steroids per medicine   - given neutropenic fever patient at risk for atypical infections, monitor for this and will be on cefepime; most likely E coli in blood is from bowel translocation in setting of neutropenia  - GI evaluation for GI Bleeding  - dialysis per schedule  - lung mass to be addressed at some point, the pneumonia most likely has component of post obstructive pneumonia as well  - will repeat blood cultures to ensure clearance of bacteria  2. other issues: per medicine Elevated Blood Pressure: Care Instructions  Your Care Instructions    Blood pressure is a measure of how hard the blood pushes against the walls of your arteries. It's normal for blood pressure to go up and down throughout the day. But if it stays up over time, you have high blood pressure. Two numbers tell you your blood pressure. The first number is the systolic pressure. It shows how hard the blood pushes when your heart is pumping. The second number is the diastolic pressure. It shows how hard the blood pushes between heartbeats, when your heart is relaxed and filling with blood. An ideal blood pressure in adults is less than 120/80 (say \"120 over 80\"). High blood pressure is 140/90 or higher. You have high blood pressure if your top number is 140 or higher or your bottom number is 90 or higher, or both. The main test for high blood pressure is simple, fast, and painless. To diagnose high blood pressure, your doctor will test your blood pressure at different times. After testing your blood pressure, your doctor may ask you to test it again when you are home. If you are diagnosed with high blood pressure, you can work with your doctor to make a long-term plan to manage it. Follow-up care is a key part of your treatment and safety. Be sure to make and go to all appointments, and call your doctor if you are having problems. It's also a good idea to know your test results and keep a list of the medicines you take. How can you care for yourself at home? · Do not smoke. Smoking increases your risk for heart attack and stroke. If you need help quitting, talk to your doctor about stop-smoking programs and medicines. These can increase your chances of quitting for good. · Stay at a healthy weight. · Try to limit how much sodium you eat to less than 2,300 milligrams (mg) a day. Your doctor may ask you to try to eat less than 1,500 mg a day. · Be physically active.  Get at least 30 minutes of exercise on most days of the week. Walking is a good choice. You also may want to do other activities, such as running, swimming, cycling, or playing tennis or team sports. · Avoid or limit alcohol. Talk to your doctor about whether you can drink any alcohol. · Eat plenty of fruits, vegetables, and low-fat dairy products. Eat less saturated and total fats. · Learn how to check your blood pressure at home. When should you call for help? Call your doctor now or seek immediate medical care if:  ? · Your blood pressure is much higher than normal (such as 180/110 or higher). ? · You think high blood pressure is causing symptoms such as:  ¨ Severe headache. ¨ Blurry vision. ? Watch closely for changes in your health, and be sure to contact your doctor if:  ? · You do not get better as expected. Where can you learn more? Go to http://kennRipple Technologiesnguyen.info/. Enter O022 in the search box to learn more about \"Elevated Blood Pressure: Care Instructions. \"  Current as of: September 21, 2016  Content Version: 11.4  © 2774-6552 [employer]. Care instructions adapted under license by [employer] (which disclaims liability or warranty for this information). If you have questions about a medical condition or this instruction, always ask your healthcare professional. [address] any warranty or liability for your use of this information. Patient Education        Headache: Care Instructions  Your Care Instructions    Headaches have many possible causes. Most headaches aren't a sign of a more serious problem, and they will get better on their own. Home treatment may help you feel better faster. The doctor has checked you carefully, but problems can develop later. If you notice any problems or new symptoms, get medical treatment right away. Follow-up care is a key part of your treatment and safety.  Be sure to make and go to all appointments, and call your doctor if you are having problems. It's also a good idea to know your test results and keep a list of the medicines you take. How can you care for yourself at home? · Do not drive if you have taken a prescription pain medicine. · Rest in a quiet, dark room until your headache is gone. Close your eyes and try to relax or go to sleep. Don't watch TV or read. · Put a cold, moist cloth or cold pack on the painful area for 10 to 20 minutes at a time. Put a thin cloth between the cold pack and your skin. · Use a warm, moist towel or a heating pad set on low to relax tight shoulder and neck muscles. · Have someone gently massage your neck and shoulders. · Take pain medicines exactly as directed. ? If the doctor gave you a prescription medicine for pain, take it as prescribed. ? If you are not taking a prescription pain medicine, ask your doctor if you can take an over-the-counter medicine. · Be careful not to take pain medicine more often than the instructions allow, because you may get worse or more frequent headaches when the medicine wears off. · Do not ignore new symptoms that occur with a headache, such as a fever, weakness or numbness, vision changes, or confusion. These may be signs of a more serious problem. To prevent headaches  · Keep a headache diary so you can figure out what triggers your headaches. Avoiding triggers may help you prevent headaches. Record when each headache began, how long it lasted, and what the pain was like (throbbing, aching, stabbing, or dull). Write down any other symptoms you had with the headache, such as nausea, flashing lights or dark spots, or sensitivity to bright light or loud noise. Note if the headache occurred near your period. List anything that might have triggered the headache, such as certain foods (chocolate, cheese, wine) or odors, smoke, bright light, stress, or lack of sleep. · Find healthy ways to deal with stress.  Headaches are most common during or right after stressful times. Take time to relax before and after you do something that has caused a headache in the past.  · Try to keep your muscles relaxed by keeping good posture. Check your jaw, face, neck, and shoulder muscles for tension, and try relaxing them. When sitting at a desk, change positions often, and stretch for 30 seconds each hour. · Get plenty of sleep and exercise. · Eat regularly and well. Long periods without food can trigger a headache. · Treat yourself to a massage. Some people find that regular massages are very helpful in relieving tension. · Limit caffeine by not drinking too much coffee, tea, or soda. But don't quit caffeine suddenly, because that can also give you headaches. · Reduce eyestrain from computers by blinking frequently and looking away from the computer screen every so often. Make sure you have proper eyewear and that your monitor is set up properly, about an arm's length away. · Seek help if you have depression or anxiety. Your headaches may be linked to these conditions. Treatment can both prevent headaches and help with symptoms of anxiety or depression. When should you call for help? Call 911 anytime you think you may need emergency care. For example, call if:    · You have signs of a stroke. These may include:  ? Sudden numbness, paralysis, or weakness in your face, arm, or leg, especially on only one side of your body. ? Sudden vision changes. ? Sudden trouble speaking. ? Sudden confusion or trouble understanding simple statements. ? Sudden problems with walking or balance. ? A sudden, severe headache that is different from past headaches.    Call your doctor now or seek immediate medical care if:    · You have a new or worse headache.     · Your headache gets much worse. Where can you learn more? Go to http://kenn-nguyen.info/. Enter M271 in the search box to learn more about \"Headache: Care Instructions. \"  Current as of: Jeanie 3, 2018  Content Version: 11.9  © 3776-9470 [employer]. Care instructions adapted under license by [employer] (which disclaims liability or warranty for this information). If you have questions about a medical condition or this instruction, always ask your healthcare professional. [address] any warranty or liability for your use of this information. Patient Education        Head or Face Pain: Care Instructions  Your Care Instructions    Common causes of head or face pain are allergies, stress, and injuries. Other causes include tooth problems and sinus infections. Eating certain foods, such as chocolate or cheese, or drinking certain liquids, such as coffee or cola, can cause head pain for some people. If you have mild head pain, you may not need treatment. It is important to watch your symptoms and talk to your doctor if your pain continues or gets worse. Follow-up care is a key part of your treatment and safety. Be sure to make and go to all appointments, and call your doctor if you are having problems. It's also a good idea to know your test results and keep a list of the medicines you take. How can you care for yourself at home? · Take pain medicines exactly as directed. ? If the doctor gave you a prescription medicine for pain, take it as prescribed. ? If you are not taking a prescription pain medicine, ask your doctor if you can take an over-the-counter pain medicine. · Take it easy for the next few days or longer if you are not feeling well. · Use a warm, moist towel or heating pad set on low to relax tight muscles in your shoulder and neck. Have someone gently massage your neck and shoulders. · Put ice or a cold pack on the area for 10 to 20 minutes at a time. Put a thin cloth between the ice and your skin. When should you call for help? Call 911 anytime you think you may need emergency care.  For example, call if:    · You have twitching, jerking, or a seizure.     · You passed out (lost consciousness).     · You have symptoms of a stroke. These may include:  ? Sudden numbness, tingling, weakness, or loss of movement in your face, arm, or leg, especially on only one side of your body. ? Sudden vision changes. ? Sudden trouble speaking. ? Sudden confusion or trouble understanding simple statements. ? Sudden problems with walking or balance. ? A sudden, severe headache that is different from past headaches.     · You have jaw pain and pain in your chest, shoulder, neck, or arm.    Call your doctor now or seek immediate medical care if:    · You have a fever with a stiff neck or a severe headache.     · You have nausea and vomiting, or you cannot keep food or liquids down.    Watch closely for changes in your health, and be sure to contact your doctor if:    · Your head or face pain does not get better as expected. Where can you learn more? Go to http://kenn-nguyen.info/. Enter P568 in the search box to learn more about \"Head or Face Pain: Care Instructions. \"  Current as of: September 23, 2018  Content Version: 11.9  © 8046-5532 [employer]. Care instructions adapted under license by [employer] (which disclaims liability or warranty for this information). If you have questions about a medical condition or this instruction, always ask your healthcare professional. [address] any warranty or liability for your use of this information.

## 2019-09-05 NOTE — PROGRESS NOTE ADULT - ASSESSMENT
83 yo w ESRD on HD ( MWF) , HTN, CDI/megalocolon hx , PAD w Right AKA, recent dx of NSLC not yet treated - was for planned radiation therapy after rehab completed at [facility], Now sent in c/o weakness, w fever 104, black tarry stools and blood clots in his mouth per his daughter. In ED hb was 7-->6's w hypotension SBP 70's and hyperkalemia K 6.1. Dtr reports this week pt required extra HD/PUF due to fluid overload and hypotension.      ESRD on HD w Hyperkalemia    in setting of  GIB /blood loss s/p PRBC - 2 units    arrange for regular HD today w PRBC x 1 unit   Uf as bp allows- 2 L today         Anemia w coumadin coagulopathy /severe neutropenia/pancytopenia   while on MTX for RA   PRBC at HD as above   correct INR per Medicine    GI    await heme - started on filgrastim   DC allopurinol    DC MTX      Fevers w neutropenia w GNR bactaremia    -  Iv abx per ID      d/w HD RN   d/w SICU RN      Thank you for the courtesy of this consult. We will follow this patient with you.   Management is subject to change if new information becomes available or patient condition changes.    9/3  ESRD  HCAP  E coli sepsis most likely bowel origin  ID input noted  s/p HD 9/2  No need for hd today, scheduled for am    9/4 SY  --ESRD : continue TIW HD.  --Sepsis : continue IV abtx and Po vanco per ID  --Pancytopenia : multifactorial with recent MTX therapy.    D/w Heme : Will use High Flux dialyzer in an attempt to increase MTX clearance.  --Nutrition: change to regular diet for now to maintain adequate po intake.    9/5 SY  --ESRD : Tolerated tx fairly ok yesterday.  Tx with High Flux dialyzer to increased Methotrexate clearance.  Follow up repeat MTX level.  --Pancytopenia with BM suppression due to meds.  : follow up MTX level.   --Nutrition : on Regular diet.  Follow electrolytes closely.  --Sepsis : Continue IV abtx and Po vanco.

## 2019-09-05 NOTE — PROGRESS NOTE ADULT - ASSESSMENT
84-year-old gentleman with multiple comorbid disorders    Pancytopenia, likely associated with myelosuppression.    He may have had a component of bleeding as well, however, difficult to ascertain severity of this. Apparently, he had some bleeding that was noted in his oral cavity however, hard to tell where that came from. At this time, it appears to be stable and at the time of evaluation of bleeding, he had an elevated INR and low platelet count.    Hematology notes appreciated.  Supportive measures for bleeding and control of INR.  Platelet count is improving.        Blood cells if hemoglobin is less than 8 and platelets supplement if patient is actively bleeding.    Gram-negative bacteremia, significant leukopenia. Would continue antibiotics.    I cannot rule out mild right-sided colitis but for now, we will monitor. He would be at high risk for endoscopic evaluation.  Discussed with daughter, Dominga  encourage PO intake

## 2019-09-05 NOTE — PROGRESS NOTE ADULT - ASSESSMENT
Assessment/Plan  84 y.o. Male with PMHx of ESRD on HD (MWF), HTN, RA on MTX and steroids, R AKA, Hx of severe CDI and megacolon, Severe PAD s/p LLE endarterectomy complicated by left groin infections s/p Tx and muscle flap, anemia of chronic dx s/p transfusions in the past, COPD/SHAYY, diastolic CHF, GERD, Gout, HLD, recent Dx of NSCLC not treated yet and planned for XRT this month sent from SNF due to fever up to 104F and black tarry stools this am as well as dry blood in mouth. Pt was lethargic day prior, c/o lower back pain. Found to have coagulopathy with INR>8, guaiac +, severe neutropenia, RUL/RLL infiltrates, increased in size RML mass. In ED pt was febrile, tachycardic and hypotensive, but rejected by intensivist for admission to MICU.  At the time of evaluation pt is more alert, shivering, c/o lower back pain with slightly improved BP.    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  s/p methotrexate for inflammatory Arthritis   Oncology eval  F/u counts  - supportive transfusions of PRBC and platelets   - filgrastim    # Oral thrush  - start nystatin  - monitor     #Coagulopathy  with INR of 8  - reversed with IV Vit K and KCentra with repeat INR 2   - f/u repeat INR 1.7 --> 2.6--> 2.1    #GIB in the settings of coagulopathy  #Anemia acute blood loss on chronic disease  S/p PRBC transfusion  Monitor HH  GI eval appreciated  Diet resumed    #Afib on coumadin  - warfarin held   - CCB  for rate control 60 q6h with parameters   - AC services    #ESRD on HD  #Hyperkalemia- resolved  Renal f/u appreciated  Cont HD per renal    # Right shoulder pain  - xray     #Gout - do not stop allopurinol    #COPD/SHAYY - cont current meds    #HFpEF - monitor for fluid overload    #No VTE proph due to GIB    #Dispo- cont SDU care. D/w pt and daughter at bedside.

## 2019-09-05 NOTE — CHART NOTE - NSCHARTNOTEFT_GEN_A_CORE
Consulted by Dr Howe for AC, D/W Heme Dr. Yin, pt no a candidate at this time for resumption of AC due to pancytopenia,  INR > 2 without recent Coumadin, very high risk for bleeding, will sign off, please reconsult when needed

## 2019-09-05 NOTE — PROGRESS NOTE ADULT - ASSESSMENT
84 y.o. Male with PMHx of ESRD on HD (MWF), HTN, RA on MTX and steroids, R AKA, Hx of severe CDI and megacolon, Severe PAD s/p LLE endarterectomy complicated by left groin infections s/p Tx and muscle flap, recent Dx of NSCLC in 5/2019  now admitted for Gram negative Neutropenic Sepsis compliated by coagulopathy and multifactorial anemia secondary to blood loss.     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

## 2019-09-06 LAB
ALBUMIN SERPL ELPH-MCNC: 1.6 G/DL — LOW (ref 3.3–5)
ANION GAP SERPL CALC-SCNC: 10 MMOL/L — SIGNIFICANT CHANGE UP (ref 5–17)
BASOPHILS # BLD AUTO: 0 K/UL — SIGNIFICANT CHANGE UP (ref 0–0.2)
BASOPHILS NFR BLD AUTO: 0 % — SIGNIFICANT CHANGE UP (ref 0–2)
BUN SERPL-MCNC: 54 MG/DL — HIGH (ref 7–23)
CALCIUM SERPL-MCNC: 8 MG/DL — LOW (ref 8.5–10.1)
CHLORIDE SERPL-SCNC: 99 MMOL/L — SIGNIFICANT CHANGE UP (ref 96–108)
CO2 SERPL-SCNC: 28 MMOL/L — SIGNIFICANT CHANGE UP (ref 22–31)
CREAT SERPL-MCNC: 3.13 MG/DL — HIGH (ref 0.5–1.3)
EOSINOPHIL # BLD AUTO: 0.11 K/UL — SIGNIFICANT CHANGE UP (ref 0–0.5)
EOSINOPHIL NFR BLD AUTO: 27.5 % — HIGH (ref 0–6)
GLUCOSE SERPL-MCNC: 134 MG/DL — HIGH (ref 70–99)
HCT VFR BLD CALC: 27 % — LOW (ref 39–50)
HGB BLD-MCNC: 8.6 G/DL — LOW (ref 13–17)
IMM GRANULOCYTES NFR BLD AUTO: 7.5 % — HIGH (ref 0–1.5)
INR BLD: 3.22 RATIO — HIGH (ref 0.88–1.16)
LYMPHOCYTES # BLD AUTO: 0.11 K/UL — LOW (ref 1–3.3)
LYMPHOCYTES # BLD AUTO: 27.5 % — SIGNIFICANT CHANGE UP (ref 13–44)
MCHC RBC-ENTMCNC: 28.6 PG — SIGNIFICANT CHANGE UP (ref 27–34)
MCHC RBC-ENTMCNC: 31.9 GM/DL — LOW (ref 32–36)
MCV RBC AUTO: 89.7 FL — SIGNIFICANT CHANGE UP (ref 80–100)
MONOCYTES # BLD AUTO: 0.02 K/UL — SIGNIFICANT CHANGE UP (ref 0–0.9)
MONOCYTES NFR BLD AUTO: 5 % — SIGNIFICANT CHANGE UP (ref 2–14)
NEUTROPHILS # BLD AUTO: 0.13 K/UL — LOW (ref 1.8–7.4)
NEUTROPHILS NFR BLD AUTO: 32.5 % — LOW (ref 43–77)
PHOSPHATE SERPL-MCNC: 1.7 MG/DL — LOW (ref 2.5–4.5)
PLATELET # BLD AUTO: 17 K/UL — CRITICAL LOW (ref 150–400)
POTASSIUM SERPL-MCNC: 4 MMOL/L — SIGNIFICANT CHANGE UP (ref 3.5–5.3)
POTASSIUM SERPL-SCNC: 4 MMOL/L — SIGNIFICANT CHANGE UP (ref 3.5–5.3)
PROTHROM AB SERPL-ACNC: 37.1 SEC — HIGH (ref 10–12.9)
RBC # BLD: 3.01 M/UL — LOW (ref 4.2–5.8)
RBC # FLD: 17.7 % — HIGH (ref 10.3–14.5)
SODIUM SERPL-SCNC: 137 MMOL/L — SIGNIFICANT CHANGE UP (ref 135–145)
WBC # BLD: 0.4 K/UL — CRITICAL LOW (ref 3.8–10.5)
WBC # FLD AUTO: 0.4 K/UL — CRITICAL LOW (ref 3.8–10.5)

## 2019-09-06 RX ORDER — SODIUM,POTASSIUM PHOSPHATES 278-250MG
1 POWDER IN PACKET (EA) ORAL
Refills: 0 | Status: COMPLETED | OUTPATIENT
Start: 2019-09-06 | End: 2019-09-08

## 2019-09-06 RX ADMIN — Medication 125 MILLIGRAM(S): at 12:42

## 2019-09-06 RX ADMIN — Medication 500000 UNIT(S): at 18:43

## 2019-09-06 RX ADMIN — LIDOCAINE 1 PATCH: 4 CREAM TOPICAL at 19:19

## 2019-09-06 RX ADMIN — Medication 125 MILLIGRAM(S): at 23:21

## 2019-09-06 RX ADMIN — OXYCODONE AND ACETAMINOPHEN 1 TABLET(S): 5; 325 TABLET ORAL at 01:15

## 2019-09-06 RX ADMIN — Medication 0.5 MILLIGRAM(S): at 19:35

## 2019-09-06 RX ADMIN — OXYCODONE AND ACETAMINOPHEN 1 TABLET(S): 5; 325 TABLET ORAL at 17:33

## 2019-09-06 RX ADMIN — Medication 125 MILLIGRAM(S): at 07:50

## 2019-09-06 RX ADMIN — Medication 650 MILLIGRAM(S): at 16:33

## 2019-09-06 RX ADMIN — LIDOCAINE 1 PATCH: 4 CREAM TOPICAL at 12:42

## 2019-09-06 RX ADMIN — Medication 500000 UNIT(S): at 23:20

## 2019-09-06 RX ADMIN — PANTOPRAZOLE SODIUM 40 MILLIGRAM(S): 20 TABLET, DELAYED RELEASE ORAL at 06:50

## 2019-09-06 RX ADMIN — Medication 60 MILLIGRAM(S): at 12:42

## 2019-09-06 RX ADMIN — Medication 500000 UNIT(S): at 12:42

## 2019-09-06 RX ADMIN — Medication 500000 UNIT(S): at 00:18

## 2019-09-06 RX ADMIN — Medication 125 MILLIGRAM(S): at 00:17

## 2019-09-06 RX ADMIN — Medication 650 MILLIGRAM(S): at 17:33

## 2019-09-06 RX ADMIN — Medication 1 PACKET(S): at 18:43

## 2019-09-06 RX ADMIN — Medication 1 SPRAY(S): at 12:42

## 2019-09-06 RX ADMIN — Medication 0.5 MILLIGRAM(S): at 08:58

## 2019-09-06 RX ADMIN — PANTOPRAZOLE SODIUM 40 MILLIGRAM(S): 20 TABLET, DELAYED RELEASE ORAL at 18:42

## 2019-09-06 RX ADMIN — Medication 60 MILLIGRAM(S): at 00:18

## 2019-09-06 RX ADMIN — OXYCODONE AND ACETAMINOPHEN 1 TABLET(S): 5; 325 TABLET ORAL at 00:18

## 2019-09-06 RX ADMIN — Medication 60 MILLIGRAM(S): at 18:42

## 2019-09-06 RX ADMIN — ERYTHROPOIETIN 10000 UNIT(S): 10000 INJECTION, SOLUTION INTRAVENOUS; SUBCUTANEOUS at 07:48

## 2019-09-06 RX ADMIN — Medication 125 MILLIGRAM(S): at 18:42

## 2019-09-06 RX ADMIN — CEFEPIME 1000 MILLIGRAM(S): 1 INJECTION, POWDER, FOR SOLUTION INTRAMUSCULAR; INTRAVENOUS at 12:42

## 2019-09-06 RX ADMIN — Medication 60 MILLIGRAM(S): at 23:20

## 2019-09-06 RX ADMIN — FINASTERIDE 5 MILLIGRAM(S): 5 TABLET, FILM COATED ORAL at 12:42

## 2019-09-06 RX ADMIN — LIDOCAINE 1 PATCH: 4 CREAM TOPICAL at 23:43

## 2019-09-06 RX ADMIN — OXYCODONE AND ACETAMINOPHEN 1 TABLET(S): 5; 325 TABLET ORAL at 16:32

## 2019-09-06 NOTE — CHART NOTE - NSCHARTNOTEFT_GEN_A_CORE
Upon Nutritional Assessment by the Registered Dietitian your patient was determined to meet criteria / has evidence of the following diagnosis/diagnoses:          [ ]  Mild Protein Calorie Malnutrition        [ ]  Moderate Protein Calorie Malnutrition        [x] Severe Protein Calorie Malnutrition        [ ] Unspecified Protein Calorie Malnutrition        [ ] Underweight / BMI <19        [ ] Morbid Obesity / BMI > 40      Findings:  severe malnutrition in acute on chronic illness r/t decreased ability to consume sufficient energy/protein 2/2 AMS/oral thrush AEB mod fat/mild muscle wasting; mod edema; meeting <50% of ENN    Findings as based on:  •  Comprehensive nutrition assessment and consultation  •  Calorie counts (nutrient intake analysis)  •  Food acceptance and intake status from observations by staff  •  Follow up  •  Patient education  •  Intervention secondary to interdisciplinary rounds  •   concerns      Treatment:    The following diet has been recommended:  1) change oral supplements to ensure enlive TID and gelatein TID to optimize PO intake   2) add nephrovite daily to ensure 100% RDI met   3) daily wt checks   4) monitor lytes/mins    PROVIDER Section:     By signing this assessment you are acknowledging and agree with the diagnosis/diagnoses assigned by the Registered Dietitian    Comments:

## 2019-09-06 NOTE — DIETITIAN INITIAL EVALUATION ADULT. - ENERGY NEEDS
Ht. 67 "     Wt. 67.4 Kg       not feasible 2/2 Rt AKA BMI       IBW 60 Kg     Pt is at  112  %  IBW

## 2019-09-06 NOTE — DIETITIAN INITIAL EVALUATION ADULT. - OTHER INFO
83yo male with PMH for ESRD on HD, HTN, RA, Rt AKA, severe CDI and megacolon p/w fever, black tarry stools, lethargy, c/o lower back pain.  Pt admitted with sepsis 2/2 Rt sided PNA in settings of immunosuppression on methotrexate, steroids and progressive NSCLC of RML, oral thrush, and pancytopenia. BM (+) 9/5: black.

## 2019-09-06 NOTE — DIETITIAN INITIAL EVALUATION ADULT. - FACTORS AFF FOOD INTAKE
PTA pt was eating well; if doesn't like foods served at NH, will call family to bring in food./other (specify)

## 2019-09-06 NOTE — DIETITIAN INITIAL EVALUATION ADULT. - PHYSICAL APPEARANCE
other (specify)/overweight NFPE significant for mild muscle wasting; temporal and clavicle.  moderate fat wasting; orbital.  moderate Lt leg edema.  omar score of 13: PU: SDTI on Lt outer 5th metatarsal.  Stage 2: Lt first metatarsal, Lt heel, Rt buttocks.  stage 3: sacrum.

## 2019-09-06 NOTE — PROVIDER CONTACT NOTE (CRITICAL VALUE NOTIFICATION) - SITUATION
ED attending DR. Gordon
WBC 0.34, PLT 15
growth in aerobic and anaerobic vials, Gm neg rods
lab called ---- wBC 0.29 and plt 11
no new orders at this time

## 2019-09-06 NOTE — DIETITIAN INITIAL EVALUATION ADULT. - ADD RECOMMEND
1) change oral supplements to ensure enlive TID and gelatein TID to optimize PO intake 2) add nephrovite daily to ensure 100% RDI met 3) daily wt checks 4) monitor lytes/mins

## 2019-09-06 NOTE — DIETITIAN INITIAL EVALUATION ADULT. - ENERGY INTAKE
since admission, pt with poor appetite; drinking ensure and eating gelatein: starting on 9/4: 3 days meeting <50% of ENN even with ensure and gelatein x 6 days. diet liberalized to optimize PO intake. Poor (<50%)

## 2019-09-06 NOTE — PROGRESS NOTE ADULT - ASSESSMENT
84-year-old gentleman with multiple comorbid disorders    Pancytopenia, likely associated with myelosuppression.    He may have had a component of bleeding as well, however, difficult to ascertain severity of this. Apparently, he had some bleeding that was noted in his oral cavity however, hard to tell where that came from. At this time, it appears to be stable and at the time of evaluation of bleeding, he had an elevated INR and low platelet count.    Hematology notes appreciated.  Supportive measures for bleeding and control of INR.  Platelet count is improving, by likely due to transfusion.  Hospital methotrexate toxicity.  Discussed with hematology.        Blood cells if hemoglobin is less than 8 and platelets supplement if patient is actively bleeding.    Gram-negative bacteremia, significant leukopenia. Would continue antibiotics.    I cannot rule out mild right-sided colitis but for now, we will monitor. He would be at high risk for endoscopic evaluation.  Discussed with daughter, Dominga  encourage PO intake and diet liberalized

## 2019-09-06 NOTE — PROGRESS NOTE ADULT - ASSESSMENT
Assessment/Plan  84 y.o. Male with PMHx of ESRD on HD (MWF), HTN, RA on MTX and steroids, R AKA, Hx of severe CDI and megacolon, Severe PAD s/p LLE endarterectomy complicated by left groin infections s/p Tx and muscle flap, anemia of chronic dx s/p transfusions in the past, COPD/SHAYY, diastolic CHF, GERD, Gout, HLD, recent Dx of NSCLC not treated yet and planned for XRT this month sent from SNF due to fever up to 104F and black tarry stools this am as well as dry blood in mouth. Pt was lethargic day prior, c/o lower back pain. Found to have coagulopathy with INR>8, guaiac +, severe neutropenia, RUL/RLL infiltrates, increased in size RML mass. In ED pt was febrile, tachycardic and hypotensive, but rejected by intensivist for admission to MICU.  At the time of evaluation pt is more alert, shivering, c/o lower back pain with slightly improved BP.    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  s/p methotrexate for inflammatory Arthritis   Oncology eval  F/u counts  - supportive transfusions of PRBC and platelets   - filgrastim    # Oral thrush  - start nystatin  - monitor     #Coagulopathy  with INR of 8  - reversed with IV Vit K and KCentra with repeat INR 2   - f/u repeat INR 1.7 --> 2.6--> 2.1    #GIB in the settings of coagulopathy  #Anemia acute blood loss on chronic disease  S/p PRBC transfusion  Monitor HH  GI eval appreciated  - high risk for endoscopic procedure at this time  Diet resumed    #Afib on coumadin  - warfarin held   - CCB  for rate control 60 q6h with parameters   - AC services    #ESRD on HD  #Hyperkalemia- resolved  Renal f/u appreciated  Cont HD per renal    # Right shoulder pain  - xray  - noted  - pain management    #Gout - do not stop allopurinol    #COPD/SHAYY - cont current meds    #HFpEF - monitor for fluid overload    #No VTE proph due to GIB    #Dispo- cont SDU care. D/w pt and daughter at bedside.

## 2019-09-06 NOTE — DIETITIAN INITIAL EVALUATION ADULT. - PERTINENT MEDS FT
MEDICATIONS  (STANDING):  acetaminophen   Tablet .. 650 milliGRAM(s) Oral every 12 hours  buDESOnide    Inhalation Suspension 0.5 milliGRAM(s) Inhalation two times a day  cefepime  Injectable. 1000 milliGRAM(s) IV Push daily  diltiazem    Tablet 60 milliGRAM(s) Oral every 6 hours  epoetin nelly Injectable 19230 Unit(s) IV Push <User Schedule>  finasteride 5 milliGRAM(s) Oral daily  fluticasone propionate 50 MICROgram(s)/spray Nasal Spray 1 Spray(s) Both Nostrils daily  lidocaine   Patch 1 Patch Transdermal daily  nystatin    Suspension 809893 Unit(s) Swish and Swallow every 6 hours  pantoprazole  Injectable 40 milliGRAM(s) IV Push two times a day  potassium phosphate / sodium phosphate powder 1 Packet(s) Oral two times a day  vancomycin    Solution 125 milliGRAM(s) Oral every 6 hours    MEDICATIONS  (PRN):  acetaminophen   Tablet .. 650 milliGRAM(s) Oral every 6 hours PRN Temp greater or equal to 38C (100.4F), Mild Pain (1 - 3)  ALBUTerol    90 MICROgram(s) HFA Inhaler 2 Puff(s) Inhalation every 6 hours PRN Shortness of Breath and/or Wheezing  oxyCODONE    5 mG/acetaminophen 325 mG 1 Tablet(s) Oral every 4 hours PRN Moderate Pain (4 - 6)

## 2019-09-06 NOTE — DIETITIAN INITIAL EVALUATION ADULT. - MALNUTRITION
severe malnutrition in acute on chronic illness r/t decreased ability to consume sufficient energy/protein 2/2 AMS/oral thrush AEB mod fat/mild muscle wasting; mod edema; meeting <50% of ENN severe malnutrition in acute on chronic illness

## 2019-09-06 NOTE — DIETITIAN INITIAL EVALUATION ADULT. - PERTINENT LABORATORY DATA
09-06 Na137 mmol/L Glu 134 mg/dL<H> K+ 4.0 mmol/L Cr  3.13 mg/dL<H> BUN 54 mg/dL<H> Phos 1.7 mg/dL<L> Alb 1.6 g/dL<L> PAB n/a

## 2019-09-06 NOTE — PROGRESS NOTE ADULT - ASSESSMENT
83 yo w ESRD on HD ( MWF) , HTN, CDI/megalocolon hx , PAD w Right AKA, recent dx of NSLC not yet treated - was for planned radiation therapy after rehab completed at [facility], Now sent in c/o weakness, w fever 104, black tarry stools and blood clots in his mouth per his daughter. In ED hb was 7-->6's w hypotension SBP 70's and hyperkalemia K 6.1. Dtr reports this week pt required extra HD/PUF due to fluid overload and hypotension.      ESRD on HD w Hyperkalemia    in setting of  GIB /blood loss s/p PRBC - 2 units    arrange for regular HD today w PRBC x 1 unit   Uf as bp allows- 2 L today         Anemia w coumadin coagulopathy /severe neutropenia/pancytopenia   while on MTX for RA   PRBC at HD as above   correct INR per Medicine    GI    await heme - started on filgrastim   DC allopurinol    DC MTX      Fevers w neutropenia w GNR bactaremia    -  Iv abx per ID      d/w HD RN   d/w SICU RN      Thank you for the courtesy of this consult. We will follow this patient with you.   Management is subject to change if new information becomes available or patient condition changes.    9/3  ESRD  HCAP  E coli sepsis most likely bowel origin  ID input noted  s/p HD 9/2  No need for hd today, scheduled for am    9/4 SY  --ESRD : continue TIW HD.  --Sepsis : continue IV abtx and Po vanco per ID  --Pancytopenia : multifactorial with recent MTX therapy.    D/w Heme : Will use High Flux dialyzer in an attempt to increase MTX clearance.  --Nutrition: change to regular diet for now to maintain adequate po intake.    9/5 SY  --ESRD : Tolerated tx fairly ok yesterday.  Tx with High Flux dialyzer to increased Methotrexate clearance.  Follow up repeat MTX level.  --Pancytopenia with BM suppression due to meds.  : follow up MTX level.   --Nutrition : on Regular diet.  Follow electrolytes closely.  --Sepsis : Continue IV abtx and Po vanco.    9/6  seen on HD   MTX level 0.05  platelet transfusion as ordered  hypophophatemia  neutra phos bid x 2 days, follow levels   not on PO4 binder

## 2019-09-07 LAB
ANION GAP SERPL CALC-SCNC: 12 MMOL/L — SIGNIFICANT CHANGE UP (ref 5–17)
BUN SERPL-MCNC: 38 MG/DL — HIGH (ref 7–23)
CALCIUM SERPL-MCNC: 8.4 MG/DL — LOW (ref 8.5–10.1)
CHLORIDE SERPL-SCNC: 101 MMOL/L — SIGNIFICANT CHANGE UP (ref 96–108)
CO2 SERPL-SCNC: 28 MMOL/L — SIGNIFICANT CHANGE UP (ref 22–31)
CREAT SERPL-MCNC: 2.28 MG/DL — HIGH (ref 0.5–1.3)
GLUCOSE SERPL-MCNC: 126 MG/DL — HIGH (ref 70–99)
HCT VFR BLD CALC: 29 % — LOW (ref 39–50)
HGB BLD-MCNC: 9.2 G/DL — LOW (ref 13–17)
INR BLD: 2.14 RATIO — HIGH (ref 0.88–1.16)
MCHC RBC-ENTMCNC: 28.6 PG — SIGNIFICANT CHANGE UP (ref 27–34)
MCHC RBC-ENTMCNC: 31.7 GM/DL — LOW (ref 32–36)
MCV RBC AUTO: 90.1 FL — SIGNIFICANT CHANGE UP (ref 80–100)
PLATELET # BLD AUTO: 37 K/UL — LOW (ref 150–400)
POTASSIUM SERPL-MCNC: 4.2 MMOL/L — SIGNIFICANT CHANGE UP (ref 3.5–5.3)
POTASSIUM SERPL-SCNC: 4.2 MMOL/L — SIGNIFICANT CHANGE UP (ref 3.5–5.3)
PROTHROM AB SERPL-ACNC: 24.3 SEC — HIGH (ref 10–12.9)
RBC # BLD: 3.22 M/UL — LOW (ref 4.2–5.8)
RBC # FLD: 18 % — HIGH (ref 10.3–14.5)
SODIUM SERPL-SCNC: 141 MMOL/L — SIGNIFICANT CHANGE UP (ref 135–145)
WBC # BLD: 0.4 K/UL — CRITICAL LOW (ref 3.8–10.5)
WBC # FLD AUTO: 0.4 K/UL — CRITICAL LOW (ref 3.8–10.5)

## 2019-09-07 PROCEDURE — 93010 ELECTROCARDIOGRAM REPORT: CPT

## 2019-09-07 RX ORDER — OXYCODONE AND ACETAMINOPHEN 5; 325 MG/1; MG/1
2 TABLET ORAL EVERY 4 HOURS
Refills: 0 | Status: DISCONTINUED | OUTPATIENT
Start: 2019-09-07 | End: 2019-09-08

## 2019-09-07 RX ORDER — FILGRASTIM 480MCG/1.6
480 VIAL (ML) INJECTION DAILY
Refills: 0 | Status: DISCONTINUED | OUTPATIENT
Start: 2019-09-07 | End: 2019-09-10

## 2019-09-07 RX ORDER — HYDROMORPHONE HYDROCHLORIDE 2 MG/ML
0.5 INJECTION INTRAMUSCULAR; INTRAVENOUS; SUBCUTANEOUS
Refills: 0 | Status: DISCONTINUED | OUTPATIENT
Start: 2019-09-07 | End: 2019-09-08

## 2019-09-07 RX ORDER — FENTANYL CITRATE 50 UG/ML
1 INJECTION INTRAVENOUS
Refills: 0 | Status: DISCONTINUED | OUTPATIENT
Start: 2019-09-07 | End: 2019-09-13

## 2019-09-07 RX ADMIN — FENTANYL CITRATE 1 PATCH: 50 INJECTION INTRAVENOUS at 19:26

## 2019-09-07 RX ADMIN — OXYCODONE AND ACETAMINOPHEN 2 TABLET(S): 5; 325 TABLET ORAL at 12:18

## 2019-09-07 RX ADMIN — Medication 500000 UNIT(S): at 06:09

## 2019-09-07 RX ADMIN — Medication 125 MILLIGRAM(S): at 06:09

## 2019-09-07 RX ADMIN — FENTANYL CITRATE 1 PATCH: 50 INJECTION INTRAVENOUS at 12:57

## 2019-09-07 RX ADMIN — Medication 125 MILLIGRAM(S): at 23:43

## 2019-09-07 RX ADMIN — FINASTERIDE 5 MILLIGRAM(S): 5 TABLET, FILM COATED ORAL at 12:18

## 2019-09-07 RX ADMIN — Medication 500000 UNIT(S): at 18:18

## 2019-09-07 RX ADMIN — Medication 125 MILLIGRAM(S): at 12:19

## 2019-09-07 RX ADMIN — HYDROMORPHONE HYDROCHLORIDE 0.5 MILLIGRAM(S): 2 INJECTION INTRAMUSCULAR; INTRAVENOUS; SUBCUTANEOUS at 09:46

## 2019-09-07 RX ADMIN — OXYCODONE AND ACETAMINOPHEN 2 TABLET(S): 5; 325 TABLET ORAL at 13:18

## 2019-09-07 RX ADMIN — Medication 500000 UNIT(S): at 12:19

## 2019-09-07 RX ADMIN — Medication 0.5 MILLIGRAM(S): at 20:30

## 2019-09-07 RX ADMIN — CEFEPIME 1000 MILLIGRAM(S): 1 INJECTION, POWDER, FOR SOLUTION INTRAMUSCULAR; INTRAVENOUS at 12:17

## 2019-09-07 RX ADMIN — Medication 1 SPRAY(S): at 12:17

## 2019-09-07 RX ADMIN — OXYCODONE AND ACETAMINOPHEN 1 TABLET(S): 5; 325 TABLET ORAL at 07:35

## 2019-09-07 RX ADMIN — Medication 1 PACKET(S): at 18:18

## 2019-09-07 RX ADMIN — Medication 650 MILLIGRAM(S): at 06:10

## 2019-09-07 RX ADMIN — HYDROMORPHONE HYDROCHLORIDE 0.5 MILLIGRAM(S): 2 INJECTION INTRAMUSCULAR; INTRAVENOUS; SUBCUTANEOUS at 22:21

## 2019-09-07 RX ADMIN — HYDROMORPHONE HYDROCHLORIDE 0.5 MILLIGRAM(S): 2 INJECTION INTRAMUSCULAR; INTRAVENOUS; SUBCUTANEOUS at 08:46

## 2019-09-07 RX ADMIN — Medication 60 MILLIGRAM(S): at 12:18

## 2019-09-07 RX ADMIN — Medication 480 MICROGRAM(S): at 22:25

## 2019-09-07 RX ADMIN — OXYCODONE AND ACETAMINOPHEN 2 TABLET(S): 5; 325 TABLET ORAL at 16:22

## 2019-09-07 RX ADMIN — Medication 650 MILLIGRAM(S): at 07:00

## 2019-09-07 RX ADMIN — Medication 60 MILLIGRAM(S): at 18:19

## 2019-09-07 RX ADMIN — PANTOPRAZOLE SODIUM 40 MILLIGRAM(S): 20 TABLET, DELAYED RELEASE ORAL at 22:25

## 2019-09-07 RX ADMIN — Medication 500000 UNIT(S): at 23:43

## 2019-09-07 RX ADMIN — Medication 125 MILLIGRAM(S): at 18:19

## 2019-09-07 RX ADMIN — PANTOPRAZOLE SODIUM 40 MILLIGRAM(S): 20 TABLET, DELAYED RELEASE ORAL at 06:08

## 2019-09-07 RX ADMIN — Medication 60 MILLIGRAM(S): at 06:09

## 2019-09-07 RX ADMIN — Medication 0.5 MILLIGRAM(S): at 07:46

## 2019-09-07 RX ADMIN — OXYCODONE AND ACETAMINOPHEN 1 TABLET(S): 5; 325 TABLET ORAL at 08:47

## 2019-09-07 RX ADMIN — HYDROMORPHONE HYDROCHLORIDE 0.5 MILLIGRAM(S): 2 INJECTION INTRAMUSCULAR; INTRAVENOUS; SUBCUTANEOUS at 18:16

## 2019-09-07 RX ADMIN — Medication 1 PACKET(S): at 06:08

## 2019-09-07 RX ADMIN — OXYCODONE AND ACETAMINOPHEN 2 TABLET(S): 5; 325 TABLET ORAL at 17:15

## 2019-09-07 NOTE — PROGRESS NOTE ADULT - ASSESSMENT
83 yo w ESRD on HD ( MWF) , HTN, CDI/megalocolon hx , PAD w Right AKA, recent dx of NSLC not yet treated - was for planned radiation therapy after rehab completed at [facility], Now sent in c/o weakness, w fever 104, black tarry stools and blood clots in his mouth per his daughter. In ED hb was 7-->6's w hypotension SBP 70's and hyperkalemia K 6.1. Dtr reports this week pt required extra HD/PUF due to fluid overload and hypotension.      ESRD on HD w Hyperkalemia    in setting of  GIB /blood loss s/p PRBC - 2 units    arrange for regular HD today w PRBC x 1 unit   Uf as bp allows- 2 L today         Anemia w coumadin coagulopathy /severe neutropenia/pancytopenia   while on MTX for RA   PRBC at HD as above   correct INR per Medicine    GI    await heme - started on filgrastim   DC allopurinol    DC MTX      Fevers w neutropenia w GNR bactaremia    -  Iv abx per ID      d/w HD RN   d/w SICU RN      Thank you for the courtesy of this consult. We will follow this patient with you.   Management is subject to change if new information becomes available or patient condition changes.    9/3  ESRD  HCAP  E coli sepsis most likely bowel origin  ID input noted  s/p HD 9/2  No need for hd today, scheduled for am    9/4 SY  --ESRD : continue TIW HD.  --Sepsis : continue IV abtx and Po vanco per ID  --Pancytopenia : multifactorial with recent MTX therapy.    D/w Heme : Will use High Flux dialyzer in an attempt to increase MTX clearance.  --Nutrition: change to regular diet for now to maintain adequate po intake.    9/5 SY  --ESRD : Tolerated tx fairly ok yesterday.  Tx with High Flux dialyzer to increased Methotrexate clearance.  Follow up repeat MTX level.  --Pancytopenia with BM suppression due to meds.  : follow up MTX level.   --Nutrition : on Regular diet.  Follow electrolytes closely.  --Sepsis : Continue IV abtx and Po vanco.    9/6  seen on HD   MTX level 0.05  platelet transfusion as ordered  hypophophatemia  neutra phos bid x 2 days, follow levels   not on PO4 binder    9/7 MK   - ESRD MWF   - pancytopenia due to myelosuppresion from MTX    on neupogen/ s/p plts transfusion   - anemia; epo with hd   - melena with concern for possible colitis: not candidate for endoscopic eval   - hypophos: po replacement fu trend   - pain: start low dose fentanyl patch and continue with percocet    decub ulcer

## 2019-09-07 NOTE — PROGRESS NOTE ADULT - ASSESSMENT
84 y.o. Male with PMHx of ESRD on HD (MWF), HTN, RA on MTX and steroids, R AKA, Hx of severe CDI and megacolon, Severe PAD s/p LLE endarterectomy complicated by left groin infections s/p Tx and muscle flap, recent Dx of NSCLC in 5/2019  now admitted for Gram negative Neutropenic Sepsis compliated by coagulopathy and multifactorial anemia secondary to blood loss.     Suspect MTX induced myelosuppression   patient went to  HD   high suspicion of medication induced myelosuppression confounded by active infx and bacteremia   patient has completed 4 days of filgrastim with High flux HD with nominal effect on MTX level   MTX level 0.05   can consider leucovorin 100mg q 6 hours  will recheck today.     will continue with supportive transfusion:   pRBCs if Hb less than 8   Platelets < 10 or active bleeding  Given gram negative bacteremia  continue with Filgrastim daily

## 2019-09-07 NOTE — PROGRESS NOTE ADULT - ASSESSMENT
Assessment/Plan  84 y.o. Male with PMHx of ESRD on HD (MWF), HTN, RA on MTX and steroids, R AKA, Hx of severe CDI and megacolon, Severe PAD s/p LLE endarterectomy complicated by left groin infections s/p Tx and muscle flap, anemia of chronic dx s/p transfusions in the past, COPD/SHAYY, diastolic CHF, GERD, Gout, HLD, recent Dx of NSCLC not treated yet and planned for XRT this month sent from SNF due to fever up to 104F and black tarry stools this am as well as dry blood in mouth. Pt was lethargic day prior, c/o lower back pain. Found to have coagulopathy with INR>8, guaiac +, severe neutropenia, RUL/RLL infiltrates, increased in size RML mass. In ED pt was febrile, tachycardic and hypotensive, but rejected by intensivist for admission to MICU.  At the time of evaluation pt is more alert, shivering, c/o lower back pain with slightly improved BP.    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  s/p methotrexate for inflammatory Arthritis   Palliative consult         #Coagulopathy  with INR of 8  - reversed with IV Vit K and KCentra with repeat INR 2   - f/u repeat INR 1.7 --> 2.6--> 2.1    #GIB in the settings of coagulopathy  #Anemia acute blood loss on chronic disease  S/p PRBC transfusion  Monitor   GI eval appreciated  - high risk for endoscopic procedure at this time  Diet resumed    #Afib on coumadin  - warfarin held   - CCB  for rate control 60 q6h with parameters   - AC services    #ESRD on HD  #Hyperkalemia- resolved  Cont HD per renal        #Gout - do not stop allopurinol    #COPD/SHAYY - cont current meds    #HFpEF - monitor for fluid overload    #No VTE proph due to GIB    #Dispo- Palliative consult and hospice eval

## 2019-09-08 LAB
ANION GAP SERPL CALC-SCNC: 11 MMOL/L — SIGNIFICANT CHANGE UP (ref 5–17)
BASOPHILS # BLD AUTO: 0 K/UL — SIGNIFICANT CHANGE UP (ref 0–0.2)
BASOPHILS NFR BLD AUTO: 0 % — SIGNIFICANT CHANGE UP (ref 0–2)
BUN SERPL-MCNC: 58 MG/DL — HIGH (ref 7–23)
CALCIUM SERPL-MCNC: 8.4 MG/DL — LOW (ref 8.5–10.1)
CHLORIDE SERPL-SCNC: 100 MMOL/L — SIGNIFICANT CHANGE UP (ref 96–108)
CO2 SERPL-SCNC: 29 MMOL/L — SIGNIFICANT CHANGE UP (ref 22–31)
CREAT SERPL-MCNC: 3.19 MG/DL — HIGH (ref 0.5–1.3)
EOSINOPHIL # BLD AUTO: 0.04 K/UL — SIGNIFICANT CHANGE UP (ref 0–0.5)
EOSINOPHIL NFR BLD AUTO: 7 % — HIGH (ref 0–6)
GLUCOSE SERPL-MCNC: 110 MG/DL — HIGH (ref 70–99)
HCT VFR BLD CALC: 29.1 % — LOW (ref 39–50)
HGB BLD-MCNC: 9.2 G/DL — LOW (ref 13–17)
INR BLD: 2.74 RATIO — HIGH (ref 0.88–1.16)
LYMPHOCYTES # BLD AUTO: 0.21 K/UL — LOW (ref 1–3.3)
LYMPHOCYTES # BLD AUTO: 37 % — SIGNIFICANT CHANGE UP (ref 13–44)
MCHC RBC-ENTMCNC: 28.8 PG — SIGNIFICANT CHANGE UP (ref 27–34)
MCHC RBC-ENTMCNC: 31.6 GM/DL — LOW (ref 32–36)
MCV RBC AUTO: 91.2 FL — SIGNIFICANT CHANGE UP (ref 80–100)
MONOCYTES # BLD AUTO: 0.01 K/UL — SIGNIFICANT CHANGE UP (ref 0–0.9)
MONOCYTES NFR BLD AUTO: 2 % — SIGNIFICANT CHANGE UP (ref 2–14)
NEUTROPHILS # BLD AUTO: 0.31 K/UL — LOW (ref 1.8–7.4)
NEUTROPHILS NFR BLD AUTO: 52 % — SIGNIFICANT CHANGE UP (ref 43–77)
NRBC # BLD: SIGNIFICANT CHANGE UP /100 WBCS (ref 0–0)
PLATELET # BLD AUTO: 25 K/UL — LOW (ref 150–400)
POTASSIUM SERPL-MCNC: 4.5 MMOL/L — SIGNIFICANT CHANGE UP (ref 3.5–5.3)
POTASSIUM SERPL-SCNC: 4.5 MMOL/L — SIGNIFICANT CHANGE UP (ref 3.5–5.3)
PROTHROM AB SERPL-ACNC: 31.4 SEC — HIGH (ref 10–12.9)
RBC # BLD: 3.19 M/UL — LOW (ref 4.2–5.8)
RBC # FLD: 18 % — HIGH (ref 10.3–14.5)
SODIUM SERPL-SCNC: 140 MMOL/L — SIGNIFICANT CHANGE UP (ref 135–145)
WBC # BLD: 0.58 K/UL — CRITICAL LOW (ref 3.8–10.5)
WBC # FLD AUTO: 0.58 K/UL — CRITICAL LOW (ref 3.8–10.5)

## 2019-09-08 RX ADMIN — Medication 125 MILLIGRAM(S): at 12:40

## 2019-09-08 RX ADMIN — FENTANYL CITRATE 1 PATCH: 50 INJECTION INTRAVENOUS at 06:35

## 2019-09-08 RX ADMIN — Medication 650 MILLIGRAM(S): at 17:49

## 2019-09-08 RX ADMIN — Medication 60 MILLIGRAM(S): at 17:52

## 2019-09-08 RX ADMIN — Medication 500000 UNIT(S): at 17:49

## 2019-09-08 RX ADMIN — PANTOPRAZOLE SODIUM 40 MILLIGRAM(S): 20 TABLET, DELAYED RELEASE ORAL at 17:52

## 2019-09-08 RX ADMIN — LIDOCAINE 1 PATCH: 4 CREAM TOPICAL at 12:41

## 2019-09-08 RX ADMIN — Medication 0.5 MILLIGRAM(S): at 21:14

## 2019-09-08 RX ADMIN — Medication 1 SPRAY(S): at 12:41

## 2019-09-08 RX ADMIN — Medication 500000 UNIT(S): at 05:39

## 2019-09-08 RX ADMIN — Medication 125 MILLIGRAM(S): at 17:49

## 2019-09-08 RX ADMIN — Medication 1 PACKET(S): at 05:39

## 2019-09-08 RX ADMIN — PANTOPRAZOLE SODIUM 40 MILLIGRAM(S): 20 TABLET, DELAYED RELEASE ORAL at 05:39

## 2019-09-08 RX ADMIN — Medication 0.5 MILLIGRAM(S): at 08:36

## 2019-09-08 RX ADMIN — FINASTERIDE 5 MILLIGRAM(S): 5 TABLET, FILM COATED ORAL at 12:40

## 2019-09-08 RX ADMIN — Medication 125 MILLIGRAM(S): at 23:11

## 2019-09-08 RX ADMIN — OXYCODONE AND ACETAMINOPHEN 2 TABLET(S): 5; 325 TABLET ORAL at 05:43

## 2019-09-08 RX ADMIN — Medication 60 MILLIGRAM(S): at 05:39

## 2019-09-08 RX ADMIN — CEFEPIME 1000 MILLIGRAM(S): 1 INJECTION, POWDER, FOR SOLUTION INTRAMUSCULAR; INTRAVENOUS at 12:41

## 2019-09-08 RX ADMIN — Medication 500000 UNIT(S): at 12:40

## 2019-09-08 RX ADMIN — Medication 125 MILLIGRAM(S): at 05:39

## 2019-09-08 RX ADMIN — Medication 60 MILLIGRAM(S): at 12:40

## 2019-09-08 RX ADMIN — Medication 480 MICROGRAM(S): at 12:57

## 2019-09-08 RX ADMIN — Medication 500000 UNIT(S): at 23:11

## 2019-09-08 RX ADMIN — Medication 60 MILLIGRAM(S): at 23:10

## 2019-09-08 RX ADMIN — FENTANYL CITRATE 1 PATCH: 50 INJECTION INTRAVENOUS at 19:15

## 2019-09-08 RX ADMIN — LIDOCAINE 1 PATCH: 4 CREAM TOPICAL at 19:15

## 2019-09-08 NOTE — PROGRESS NOTE ADULT - ASSESSMENT
Assessment/Plan  84 y.o. Male with PMHx of ESRD on HD (MWF), HTN, RA on MTX and steroids, R AKA, Hx of severe CDI and megacolon, Severe PAD s/p LLE endarterectomy complicated by left groin infections s/p Tx and muscle flap, anemia of chronic dx s/p transfusions in the past, COPD/SHAYY, diastolic CHF, GERD, Gout, HLD, recent Dx of NSCLC not treated yet and planned for XRT this month sent from SNF due to fever up to 104F and black tarry stools this am as well as dry blood in mouth. Pt was lethargic day prior, c/o lower back pain. Found to have coagulopathy with INR>8, guaiac +, severe neutropenia, RUL/RLL infiltrates, increased in size RML mass. In ED pt was febrile, tachycardic and hypotensive, but rejected by intensivist for admission to MICU.  At the time of evaluation pt is more alert, shivering, c/o lower back pain with slightly improved BP.    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  s/p methotrexate for inflammatory Arthritis   Palliative consult; duragesic patch; he should be hospice given his advance condition and age; pt asked many times to be fet alone " i want this to stop" family might not be that in tune with his wishes  dc dilaudid and percocet         #Coagulopathy  with INR of 8  - reversed with IV Vit K and KCentra with repeat INR 2   - f/u repeat INR 1.7 --> 2.6--> 2.1    #GIB in the settings of coagulopathy  #Anemia acute blood loss on chronic disease  S/p PRBC transfusion  Monitor   GI eval appreciated  - high risk for endoscopic procedure at this time  abnormal ct raising  suspicion for rectal  mass    #Afib on coumadin  - warfarin held   - CCB  for rate control 60 q6h with parameters   - AC services    #ESRD on HD  #Hyperkalemia- resolved  Cont HD per renal        #No VTE proph due to GIB    #Dispo- Palliative consult and hospice eval

## 2019-09-08 NOTE — PROGRESS NOTE ADULT - ASSESSMENT
85 yo w ESRD on HD ( MWF) , HTN, CDI/megalocolon hx , PAD w Right AKA, recent dx of NSLC not yet treated - was for planned radiation therapy after rehab completed at [facility], Now sent in c/o weakness, w fever 104, black tarry stools and blood clots in his mouth per his daughter. In ED hb was 7-->6's w hypotension SBP 70's and hyperkalemia K 6.1. Dtr reports this week pt required extra HD/PUF due to fluid overload and hypotension.      ESRD on HD w Hyperkalemia    in setting of  GIB /blood loss s/p PRBC - 2 units    arrange for regular HD today w PRBC x 1 unit   Uf as bp allows- 2 L today         Anemia w coumadin coagulopathy /severe neutropenia/pancytopenia   while on MTX for RA   PRBC at HD as above   correct INR per Medicine    GI    await heme - started on filgrastim   DC allopurinol    DC MTX      Fevers w neutropenia w GNR bactaremia    -  Iv abx per ID      d/w HD RN   d/w SICU RN      Thank you for the courtesy of this consult. We will follow this patient with you.   Management is subject to change if new information becomes available or patient condition changes.    9/3  ESRD  HCAP  E coli sepsis most likely bowel origin  ID input noted  s/p HD 9/2  No need for hd today, scheduled for am    9/4 SY  --ESRD : continue TIW HD.  --Sepsis : continue IV abtx and Po vanco per ID  --Pancytopenia : multifactorial with recent MTX therapy.    D/w Heme : Will use High Flux dialyzer in an attempt to increase MTX clearance.  --Nutrition: change to regular diet for now to maintain adequate po intake.    9/5 SY  --ESRD : Tolerated tx fairly ok yesterday.  Tx with High Flux dialyzer to increased Methotrexate clearance.  Follow up repeat MTX level.  --Pancytopenia with BM suppression due to meds.  : follow up MTX level.   --Nutrition : on Regular diet.  Follow electrolytes closely.  --Sepsis : Continue IV abtx and Po vanco.    9/6  seen on HD   MTX level 0.05  platelet transfusion as ordered  hypophophatemia  neutra phos bid x 2 days, follow levels   not on PO4 binder    9/7 MK   - ESRD MWF   - pancytopenia due to myelosuppresion from MTX    on neupogen/ s/p plts transfusion   - anemia; epo with hd   - melena with concern for possible colitis: not candidate for endoscopic eval   - hypophos: po replacement fu trend   - pain: start low dose fentanyl patch and continue with percocet    decub ulcer     9/8 MK   - ESRD MWF   - pancytopenia due to myelosuppresion from MTX    s/p neupogen/ s/p plts transfusion     ? leukovorin  - anemia; epo with hd   - melena with concern for possible colitis: not candidate for endoscopic eval   - hypophos: po replacement fu trend   - pain: start low dose fentanyl patch and dc dilaudid and percocet       decub ulcer   jerald rn

## 2019-09-09 LAB
ALBUMIN SERPL ELPH-MCNC: 1.7 G/DL — LOW (ref 3.3–5)
ANION GAP SERPL CALC-SCNC: 9 MMOL/L — SIGNIFICANT CHANGE UP (ref 5–17)
BASOPHILS # BLD AUTO: 0 K/UL — SIGNIFICANT CHANGE UP (ref 0–0.2)
BASOPHILS NFR BLD AUTO: 0 % — SIGNIFICANT CHANGE UP (ref 0–2)
BUN SERPL-MCNC: 75 MG/DL — HIGH (ref 7–23)
CALCIUM SERPL-MCNC: 8.6 MG/DL — SIGNIFICANT CHANGE UP (ref 8.5–10.1)
CHLORIDE SERPL-SCNC: 98 MMOL/L — SIGNIFICANT CHANGE UP (ref 96–108)
CO2 SERPL-SCNC: 27 MMOL/L — SIGNIFICANT CHANGE UP (ref 22–31)
CREAT SERPL-MCNC: 4.19 MG/DL — HIGH (ref 0.5–1.3)
EOSINOPHIL # BLD AUTO: 0.02 K/UL — SIGNIFICANT CHANGE UP (ref 0–0.5)
EOSINOPHIL NFR BLD AUTO: 1 % — SIGNIFICANT CHANGE UP (ref 0–6)
GLUCOSE SERPL-MCNC: 108 MG/DL — HIGH (ref 70–99)
HCT VFR BLD CALC: 28.8 % — LOW (ref 39–50)
HGB BLD-MCNC: 9 G/DL — LOW (ref 13–17)
INR BLD: 3.86 RATIO — HIGH (ref 0.88–1.16)
LYMPHOCYTES # BLD AUTO: 0.38 K/UL — LOW (ref 1–3.3)
LYMPHOCYTES # BLD AUTO: 20 % — SIGNIFICANT CHANGE UP (ref 13–44)
MCHC RBC-ENTMCNC: 28 PG — SIGNIFICANT CHANGE UP (ref 27–34)
MCHC RBC-ENTMCNC: 31.3 GM/DL — LOW (ref 32–36)
MCV RBC AUTO: 89.7 FL — SIGNIFICANT CHANGE UP (ref 80–100)
MONOCYTES # BLD AUTO: 0.21 K/UL — SIGNIFICANT CHANGE UP (ref 0–0.9)
MONOCYTES NFR BLD AUTO: 11 % — SIGNIFICANT CHANGE UP (ref 2–14)
NEUTROPHILS # BLD AUTO: 1.28 K/UL — LOW (ref 1.8–7.4)
NEUTROPHILS NFR BLD AUTO: 68 % — SIGNIFICANT CHANGE UP (ref 43–77)
NRBC # BLD: SIGNIFICANT CHANGE UP /100 WBCS (ref 0–0)
PHOSPHATE SERPL-MCNC: 3.6 MG/DL — SIGNIFICANT CHANGE UP (ref 2.5–4.5)
PLATELET # BLD AUTO: 34 K/UL — LOW (ref 150–400)
POTASSIUM SERPL-MCNC: 4.7 MMOL/L — SIGNIFICANT CHANGE UP (ref 3.5–5.3)
POTASSIUM SERPL-SCNC: 4.7 MMOL/L — SIGNIFICANT CHANGE UP (ref 3.5–5.3)
PROTHROM AB SERPL-ACNC: 44.7 SEC — HIGH (ref 10–12.9)
RBC # BLD: 3.21 M/UL — LOW (ref 4.2–5.8)
RBC # FLD: 18.4 % — HIGH (ref 10.3–14.5)
SODIUM SERPL-SCNC: 134 MMOL/L — LOW (ref 135–145)
WBC # BLD: 1.88 K/UL — LOW (ref 3.8–10.5)
WBC # FLD AUTO: 1.88 K/UL — LOW (ref 3.8–10.5)

## 2019-09-09 PROCEDURE — 93971 EXTREMITY STUDY: CPT | Mod: 26,RT

## 2019-09-09 RX ORDER — HYDROMORPHONE HYDROCHLORIDE 2 MG/ML
0.5 INJECTION INTRAMUSCULAR; INTRAVENOUS; SUBCUTANEOUS ONCE
Refills: 0 | Status: DISCONTINUED | OUTPATIENT
Start: 2019-09-09 | End: 2019-09-09

## 2019-09-09 RX ORDER — HYDROMORPHONE HYDROCHLORIDE 2 MG/ML
0.5 INJECTION INTRAMUSCULAR; INTRAVENOUS; SUBCUTANEOUS EVERY 4 HOURS
Refills: 0 | Status: DISCONTINUED | OUTPATIENT
Start: 2019-09-09 | End: 2019-09-10

## 2019-09-09 RX ORDER — LEUCOVORIN CALCIUM 5 MG
20 TABLET ORAL EVERY 8 HOURS
Refills: 0 | Status: DISCONTINUED | OUTPATIENT
Start: 2019-09-09 | End: 2019-09-09

## 2019-09-09 RX ORDER — FLUCONAZOLE 150 MG/1
100 TABLET ORAL ONCE
Refills: 0 | Status: COMPLETED | OUTPATIENT
Start: 2019-09-09 | End: 2019-09-09

## 2019-09-09 RX ORDER — FLUCONAZOLE 150 MG/1
TABLET ORAL
Refills: 0 | Status: DISCONTINUED | OUTPATIENT
Start: 2019-09-09 | End: 2019-09-10

## 2019-09-09 RX ORDER — FLUCONAZOLE 150 MG/1
100 TABLET ORAL EVERY 24 HOURS
Refills: 0 | Status: DISCONTINUED | OUTPATIENT
Start: 2019-09-10 | End: 2019-09-10

## 2019-09-09 RX ADMIN — Medication 125 MILLIGRAM(S): at 14:32

## 2019-09-09 RX ADMIN — Medication 480 MICROGRAM(S): at 14:38

## 2019-09-09 RX ADMIN — PANTOPRAZOLE SODIUM 40 MILLIGRAM(S): 20 TABLET, DELAYED RELEASE ORAL at 06:29

## 2019-09-09 RX ADMIN — Medication 125 MILLIGRAM(S): at 06:29

## 2019-09-09 RX ADMIN — ERYTHROPOIETIN 10000 UNIT(S): 10000 INJECTION, SOLUTION INTRAVENOUS; SUBCUTANEOUS at 10:52

## 2019-09-09 RX ADMIN — Medication 125 MILLIGRAM(S): at 23:58

## 2019-09-09 RX ADMIN — FINASTERIDE 5 MILLIGRAM(S): 5 TABLET, FILM COATED ORAL at 18:09

## 2019-09-09 RX ADMIN — HYDROMORPHONE HYDROCHLORIDE 0.5 MILLIGRAM(S): 2 INJECTION INTRAMUSCULAR; INTRAVENOUS; SUBCUTANEOUS at 09:21

## 2019-09-09 RX ADMIN — Medication 125 MILLIGRAM(S): at 18:10

## 2019-09-09 RX ADMIN — Medication 650 MILLIGRAM(S): at 06:30

## 2019-09-09 RX ADMIN — Medication 650 MILLIGRAM(S): at 18:09

## 2019-09-09 RX ADMIN — PANTOPRAZOLE SODIUM 40 MILLIGRAM(S): 20 TABLET, DELAYED RELEASE ORAL at 18:03

## 2019-09-09 RX ADMIN — CEFEPIME 1000 MILLIGRAM(S): 1 INJECTION, POWDER, FOR SOLUTION INTRAMUSCULAR; INTRAVENOUS at 14:38

## 2019-09-09 RX ADMIN — Medication 500000 UNIT(S): at 06:29

## 2019-09-09 RX ADMIN — LIDOCAINE 1 PATCH: 4 CREAM TOPICAL at 00:15

## 2019-09-09 RX ADMIN — Medication 60 MILLIGRAM(S): at 06:29

## 2019-09-09 RX ADMIN — FLUCONAZOLE 50 MILLIGRAM(S): 150 TABLET ORAL at 18:03

## 2019-09-09 NOTE — GOALS OF CARE CONVERSATION - PERSONAL ADVANCE DIRECTIVE - CONVERSATION DETAILS
Met with pt's family to discuss medical issues and overall GOC. They explained that pt has had a very complicated course over the past couple of years. 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 His wife also  few years ago, who the pt thinks of fondly often. He remains a family man, finding it important to make it to events like his granddaughter's recent wedding (August) even in the midst of his ailments. His daughters explain that the pt's issues started off with vascular compromise leading to need for R AKA which he was able to recover from quite well. However, he continued to have need for clearing of obstruction on the other LEs which was unfortunately frought with complications of infection, need for muscle flap, and also short bout of pneumonia. Amidst all of this, pt first lived in [facility] then moved to [facility] more recently. While there he has made many friends, continues to be an easy-going positive man, who needs help with majority of his ADLs, though was able to help with transferring to chair as back in May. Unfortunately, his health was also impacted by significant RA requiring MTX on and off under guidance of outpt rheumatologist, with a break from this for 2 moths after May, but reintroduction of it. Initially pt tolerated this well, but unfortunately now this seems to have contributed to immunosuppression and current need for hospitalization. With all of these events the pt has, for the first time, began to note that he feels his life is coming to an end, making peace with this, and leaving his family to figure out how to navigate what their father feels and clinicians are concerned is happening.     We spent some time reviewing pt's current acute illnesses as well and how they factor into his overall prognosis. The family was well-versed in the current issues including sepsis from PNA, pancytopenia requiring multiple forms of blood products and likely due to immunosuppression from MTX; NSCLC; delirium; and pain; GIB; and chronic need for HD. They shared understanding that pt has not rebounded as they would have hoped with PRBCs, neupogen, and platelets. They also note understanding that the pt does not have as much reserve as he once had, with multiple medical problems that are obstacles to his wellness.     When considering all of the above, family shared that the pt, in his own way, has been giving them messages about his acceptance of his mortality. In regard to his NSCLC, they explain that the pt was supposed to initiate RT but pushed off all the appointments, which they felt was his way of saying, without explicitly saying, that he did not want to go through with it. Of note, his sister also had lung cancer, and they all saw what she endured to have this treated. Likewise, they note him recently saying that after 3 years his wife has come to him saying that it was "time to come home." He also notes that he is dying and that he cannot do "this" (continuing treatments) any more. Of note, up until very recently the pt has always been "full steam ahead" when considering life-saving interventions, but now is telling his HCPs that he wants to die peacefully, without intubation or resuscitation. However, he never felt comfortable signing forms to this effect, saying instead, that his HCPs know and would carry out his wishes. We took time to discuss this, with HCP1 Ivis sharing her perspective as an ICU nurse with palliative care training as well. Ultimately, they all agreed with recommendation to put this in place solidly via MOLST to remove all doubt for clinicians who were caring for him.    The family was tearful often when considering how much things have changed for the pt, though not surprised by what has happened. They inquired about stopping dialysis and what a comfort plan would look like if they did. Took time to discuss the difference between his current plan (c/w HD/chronic illness tx, return to hospital for further acut events, i.e. palliative care) vs. comfort care under the auspices of hospice. Explained (when asked) that on average once someone stops dialysis their prognosis could be 8-10days, but to take this with a grain of salt because every pt is different. They noted that the pt has already slowed down with his intake and began to have some trouble with swallowing pills this am, worried that he was starving. Discussed the infinite intelligence of the body to ask for (verbally and nonverbally) what it wants and what it can handle. Noted that with all the issues his body is having, that it needs to conserve it's energy for what is absolutely necessary, like the function of his major organs, often not leaving much energy left over to have desire for or ability to manage adequate intake. Normalized their feelings of consternation about this with the help of HCP1 and her experience, reassuring them that at this point his diet has been quite liberalized and he is able to take in whatever gives him ciro blanka, they would not want a feeding tube.     Given how the family struggled to put in place what they explained at length was the pt's expressed wishes in the form of a DNR and DNI via MOLST, recommended we give them some time to talk more about big decision to stop dialysis and opt for hospice. Reviewed inpt hospice facility options, noting that, if/when ready, they would want [facility]. Reassured them that what is most important to the teams now is they pt's comfort (they agreed with prn dosing of IV dilaudid 0.5mg for breakthrough pain), and their peace with whatever they decide. Noted that we would visit daily to help ensure both, which they expressed gratefulness for. For now, continue with current interventions until they tell us what their decision will be moving forward: c/w palliative plan vs. cessation of dilaysis and initiation of hospice referral. Shared above with Dr. Castelan, RN, and floor DAYTON Ray.

## 2019-09-09 NOTE — CONSULT NOTE ADULT - CONSULT REQUESTED DATE/TIME
01-Sep-2019 15:37
01-Sep-2019 17:21
02-Sep-2019 10:59
02-Sep-2019 11:03
08-Sep-2019
04-Sep-2019 15:01

## 2019-09-09 NOTE — CONSULT NOTE ADULT - ASSESSMENT
84y old Male coming from [facility] with hx of ESRD on HD (MWF), HTN, RA on MTX and steroids, R AKA, dCHF, severe CDI and megacolon, Severe PAD s/p LLE endarterectomy complicated by left groin infections s/p Tx and muscle flap, recent Dx of NSCLC not treated yet and planned for XRT this month, 5th hospitalization in a year, sent from [facility]. Admitted 8/1 due to fever up to 104F and black tarry stools this am as well as dry blood in mouth. Found to have hypotension, fever, and tachycardia on admission, attributed to sepsis from finding of RUL/RLL pna on CTCAP. Also course c/b finding of pancytopenia with coagulopathy with INR>8, guaiac +, (s/p vit k, kcentra, PRBCs, neupogen, and platelets, likely due to myelosuppression from MTX. Imaging also showed increased in size RML mass and concern for rectal mass, deemed high risk for procedure. Palliative Care consulted to assist with establishing GOC.     1) Pain  - lower back pain, hx of sig RA on MTX  - agree with continuing with low dose fentanyl patch as this is the wish of the family and safest opioid in setting of ESRD  - having breakthrough pain--> suggest IV dilaudid 0.5mg q4h prn breakthrough  - needs longer-acting breakthrough as well, will monitor for tolerance of IV before adding po option    2) AMS  - likely due to delirium  - will wax and wane  - fall and aspiration precautions  - frequent reorientation    3) Sepsis  - on IV abx for PNa on imaging  - improved  - likely due to immunosuppression increasing risk for opportunistic infections  - seems to also have thrush as well  - agree with plan for decadron    4) Pancytopenia  - heme notes appreciated  - most likely due to myelosuppression from MTX  - s/p vitK, s/p Kcentra, s/p PRBCs, s/p neupogen  - platelets still quite low    5) GIB  - GI notes appreciated  - high risk for endoscopy  - supportive care in the interim with transfusions as needed  - H/H appears stable    6) ESRD  - nephrology notes appreciated  - tolerating HD    7) NSCLC  - supposed to initiate XRT but admitted and in [facility]  - unclear plan for this  - suggestion of progression on imaging since dx 5/2019    8) Debility  - PPS<40%  - nutrition notes appreciated- severe protein calorie malnutrition noted  - PT notes appreciated- EMMANUEL recommended  - Greg BENITEZ notes appreciated- needed assistance for ADLs and many activities did not occur    9) Prognosis  - appears poor  - in light of multiple significant comorbidities including ESRD on HD, severe vasculopathy (already s/p RAKA), myelosuppression and progressive newly dx NSCLC, significant debility with multiple hospitalizations, albumin 1.6, severe protein calorie malnutrition, PPS<40%. Pt would only be candidate for hospice if decision made to stop dialysis    10) GoC/Advanced Directives  - pt does not appear to have capacity for decision making at this time due to likely delirium, which may wax and wane  - HCP on file naming daughters: 1) Lore Esteves [phone]; and 2) Anamika Tena [phone]  - full code, will discuss  - GOC meeting this am, see note to follow    Thank you for including us in Mr. Yost's care. Will continue to follow with you.    Ronald Franco MD  Palliative Care Attending

## 2019-09-09 NOTE — PROGRESS NOTE ADULT - ASSESSMENT
84 y.o. Male with PMHx of ESRD on HD (MWF), HTN, RA on MTX and steroids, R AKA, Hx of severe CDI and megacolon, Severe PAD s/p LLE endarterectomy complicated by left groin infections s/p Tx and muscle flap, recent Dx of NSCLC in 5/2019  now admitted for Gram negative Neutropenic Sepsis compliated by coagulopathy and multifactorial anemia secondary to blood loss.     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

## 2019-09-09 NOTE — GOALS OF CARE CONVERSATION - PERSONAL ADVANCE DIRECTIVE - AGENT'S NAME
APPEARANCE: Alert, oriented and in no acute distress.  CARDIAC: Normal rate and rhythm, no murmur heard.   PERIPHERAL VASCULAR: peripheral pulses present. Normal cap refill. No edema. Warm to touch.    RESPIRATORY:Normal rate and effort, breath sounds clear bilaterally throughout chest. Respirations are equal and unlabored no obvious signs of distress.  GASTRO: soft, bowel sounds normal, no tenderness, no abdominal distention.  MUSC: Full ROM. No bony tenderness or soft tissue tenderness. No obvious deformity.  SKIN: Skin is warm and dry, normal skin turgor, mucous membranes moist. SWELLING TO LEFT ANKLE  NEURO: 5/5 strength major flexors/extensors bilaterally. Sensory intact to light touch bilaterally. Manisha coma scale: eyes open spontaneously-4, oriented & converses-5, obeys commands-6. No neurological abnormalities.   MENTAL STATUS: awake, alert and aware of environment.  EYE: PERRL, both eyes: pupils brisk and reactive to light. Normal size.  ENT: Patient is [place] to left ear and right ear has hearing aid.        
Patient presents to ED with c/o left ankle pain 10/10. Patient stated he twisted his ankle this morning. Left ankle is swollen and painful to touch. Ice pack applied to Left ankle.   
Lore Esteves and Anamika Yost

## 2019-09-09 NOTE — PROGRESS NOTE ADULT - ASSESSMENT
Assessment/Plan  84 y.o. Male with PMHx of ESRD on HD (MWF), HTN, RA on MTX and steroids, R AKA, Hx of severe CDI and megacolon, Severe PAD s/p LLE endarterectomy complicated by left groin infections s/p Tx and muscle flap, anemia of chronic dx s/p transfusions in the past, COPD/SHAYY, diastolic CHF, GERD, Gout, HLD, recent Dx of NSCLC not treated yet and planned for XRT this month sent from [facility] due to fever up to 104F and black tarry stools this am as well as dry blood in mouth. Pt was lethargic day prior, c/o lower back pain. Found to have coagulopathy with INR>8, guaiac +, severe neutropenia, RUL/RLL infiltrates, increased in size RML mass. In ED pt was febrile, tachycardic and hypotensive, but rejected by intensivist for admission to MICU.  At the time of evaluation pt is more alert, shivering, c/o lower back pain with slightly improved BP.    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  s/p methotrexate for inflammatory Arthritis   Palliative consult; duragesic patch; he should be hospice given his advance condition and age; pt asked many times to be fet alone " i want this to stop" family might not be that in tune with his wishes  dc dilaudid and percocet  poss hospice in few days if HD stopped    #GIB in the settings of coagulopathy  #Anemia acute blood loss on chronic disease  S/p PRBC transfusion  abnormal ct raising  suspicion for rectal  mass    #Afib on coumadin  - warfarin held   - CCB  for rate control 60 q6h with parameters   - AC services    #ESRD on HD            #Dispo- Palliative consult and hospice eval

## 2019-09-09 NOTE — GOALS OF CARE CONVERSATION - PERSONAL ADVANCE DIRECTIVE - TREATMENT GUIDELINE COMMENT
MOLST decisions: DNR, DNI, use abx (left the rest of the decisions off for now, as they are still deciding on overall plan).    *Spent 90 minutes discussing GOC with family including Advance care planning, explanation and discussion of advance directives, reviewed all treatment/dispo options, and MOLST.

## 2019-09-09 NOTE — CONSULT NOTE ADULT - CONSULT REASON
GI bleed
GOC
NSCLC, Pancytopenia
hyperkalemia   ESRD on HD
pneumonia
A. Fib on coumadin with coagulopathy s/p vit K, ? GI bleed for AC management

## 2019-09-09 NOTE — PROGRESS NOTE ADULT - ASSESSMENT
83 yo w ESRD on HD ( MWF) , HTN, CDI/megalocolon hx , PAD w Right AKA, recent dx of NSLC not yet treated - was for planned radiation therapy after rehab completed at [facility], Now sent in c/o weakness, w fever 104, black tarry stools and blood clots in his mouth per his daughter. In ED hb was 7-->6's w hypotension SBP 70's and hyperkalemia K 6.1. Dtr reports this week pt required extra HD/PUF due to fluid overload and hypotension.      ESRD on HD w Hyperkalemia    in setting of  GIB /blood loss s/p PRBC - 2 units    arrange for regular HD today w PRBC x 1 unit   Uf as bp allows- 2 L today         Anemia w coumadin coagulopathy /severe neutropenia/pancytopenia   while on MTX for RA   PRBC at HD as above   correct INR per Medicine    GI    await heme - started on filgrastim   DC allopurinol    DC MTX      Fevers w neutropenia w GNR bactaremia    -  Iv abx per ID      d/w HD RN   d/w SICU RN      Thank you for the courtesy of this consult. We will follow this patient with you.   Management is subject to change if new information becomes available or patient condition changes.    9/3  ESRD  HCAP  E coli sepsis most likely bowel origin  ID input noted  s/p HD 9/2  No need for hd today, scheduled for am    9/4 SY  --ESRD : continue TIW HD.  --Sepsis : continue IV abtx and Po vanco per ID  --Pancytopenia : multifactorial with recent MTX therapy.    D/w Heme : Will use High Flux dialyzer in an attempt to increase MTX clearance.  --Nutrition: change to regular diet for now to maintain adequate po intake.    9/5 SY  --ESRD : Tolerated tx fairly ok yesterday.  Tx with High Flux dialyzer to increased Methotrexate clearance.  Follow up repeat MTX level.  --Pancytopenia with BM suppression due to meds.  : follow up MTX level.   --Nutrition : on Regular diet.  Follow electrolytes closely.  --Sepsis : Continue IV abtx and Po vanco.    9/6  seen on HD   MTX level 0.05  platelet transfusion as ordered  hypophophatemia  neutra phos bid x 2 days, follow levels   not on PO4 binder    9/7 MK   - ESRD MWF   - pancytopenia due to myelosuppresion from MTX    on neupogen/ s/p plts transfusion   - anemia; epo with hd   - melena with concern for possible colitis: not candidate for endoscopic eval   - hypophos: po replacement fu trend   - pain: start low dose fentanyl patch and continue with percocet    decub ulcer     9/8 MK   - ESRD MWF   - pancytopenia due to myelosuppresion from MTX    s/p neupogen/ s/p plts transfusion     ? leukovorin  - anemia; epo with hd   - melena with concern for possible colitis: not candidate for endoscopic eval   - hypophos: po replacement fu trend   - pain: start low dose fentanyl patch and dc dilaudid and percocet       decub ulcer   dw rn     9/9 SY  --ESRD : BP in 80's.  Will treat for 3 hours today.  D/w Pt's daughter and son.  Palliative medicine evaluation and follow up noted.  D/w family at length re : withdrawal from dialysis.  All questions answered.  Daughter states family will likely come to a decision later today.

## 2019-09-09 NOTE — CHART NOTE - NSCHARTNOTEFT_GEN_A_CORE
RN notified, pt was lethargic.   Pt was seen and examined with family members at bedside (Daughter: Eileen and Grand daughter: Kelly). As per family, pt waxes and wanes. Pt was same in the evening but woke up at naomi with a lot more energy. As per family, pt had been gradually deteriorating. Pt was alert and responded to command when I spoke with him. Pt was able to identify both daughter and grand daughter at bedside by name and was able to recall recent wedding he went to in August and the date. Pt was able to recall his RN and aides names as well.   Also noted right arm swelling and pain at where his AV fistula is placed. Reports fatigue.     Vital Signs Last 24 Hrs  T(C): 36.1 (09 Sep 2019 05:13), Max: 36.8 (09 Sep 2019 00:32)  T(F): 97 (09 Sep 2019 05:13), Max: 98.2 (09 Sep 2019 00:32)  HR: 93 (09 Sep 2019 05:13) (69 - 93)  BP: 130/40 (09 Sep 2019 05:42) (113/30 - 140/31)  RR: 20 (09 Sep 2019 00:32) (20 - 20)  SpO2: 97% (09 Sep 2019 05:13) (94% - 97%)    PE:   GEN: NAD, + weakness  HEENT: pinpoint pupil, reactive to light. EOMI.  CV: +s1/s2  Lung: CTABL  Abdo: Soft, NT, ND  Extre: Right arm swelling    A/P:   #Lethargy likely d/t fentanyl patch; d/t pt being woken up suddenly  #RUE swelling and pain  - Family does not want patch to be removed and would like pt to be in no pain. As per family they are ok with pt having the fentanyl patch as opposed to it being removed and pt being more alert and in pain.   - Family again decline CTH as they believe pt changes began after fentanyl patch was placed and is d/t patch. Explained to family there could be other causes but family refused as they don't want pt to me moved out of the room to get the scan and make him uncomfortable.   - Tried to discuss what their wishes for the pt is but the family will rather wait for their meeting with palliative today to aide them make a decision.   - Family agrees to bedside US of right arm. F/u results.  - Will continue to monitor pt clinically.  - Pt was re assessed after 30 mins. Pt seems more awake and alert. Will continue to re assess pt clinically.

## 2019-09-09 NOTE — CONSULT NOTE ADULT - SUBJECTIVE AND OBJECTIVE BOX
HPI: Mr. Yost is an 84y old Male coming from [facility] with hx of ESRD on HD (MWF), HTN, RA on MTX and steroids, R AKA, dCHF, severe CDI and megacolon, Severe PAD s/p LLE endarterectomy complicated by left groin infections s/p Tx and muscle flap, recent Dx of NSCLC not treated yet and planned for XRT this month sent from SNF due to fever up to 104F and black tarry stools this am as well as dry blood in mouth. Pt was lethargic day prior, c/o lower back pain. Found to have coagulopathy with INR>8, guaiac +, severe neutropenia, RUL/RLL infiltrates, increased in size RML mass. In ED pt was febrile, tachycardic and hypotensive, adm for sepsis      PAIN: ( )Yes   ( )No  Level:  Location:  Intensity:    /10  Quality:  Aggravating Factors:  Alleviating Factors:  Radiation:  Duration/Timing:  Impact on ADLs:    DYSPNEA: ( ) Yes  ( ) No  Level:    PAST MEDICAL & SURGICAL HISTORY:  Rheumatoid arthritis  Above knee amputation of right lower extremity  Recurrent Clostridium difficile diarrhea  Diastolic CHF  Peripheral vascular disease  Afib  Anemia  CKD (chronic kidney disease)  COPD (chronic obstructive pulmonary disease)  SHAYY (obstructive sleep apnea)  Sepsis, due to unspecified organism: 2/2 poorly healing wounds b/l  Dyspepsia: On moderate exertion.  Sleep apnea, obstructive: Requires home 02 therapy, and treatment with BIPAP  Atelectasis  Pleural effusion, bilateral  Respiratory failure  Peripheral edema  CRI (chronic renal insufficiency)  Gout  Benign prostatic hypertrophy  Spinal stenosis  Hypercholesterolemia  GERD (gastroesophageal reflux disease)  CAD (coronary artery disease)  Hypertension  S/P angioplasty with stent  Cataract of left eye  Prostate: Surgery green light procedure.  S/P rotator cuff surgery: Right  S/P angioplasty      SOCIAL HX:    Hx opiate tolerance ( )YES  ( )NO    Baseline ADLs  (Prior to Admission)  ( ) Independent   ( )Dependent    FAMILY HISTORY:  Family history of colorectal cancer  Family history of diabetes mellitus (Sibling)  Family history of hypertension      Review of Systems:    Anxiety-  Depression-  Physical Discomfort-  Dyspnea-  Constipation-  Diarrhea-  Nausea-  Vomiting-  Anorexia-  Weight Loss-   Cough-  Secretions-  Fatigue-  Weakness-  Delirium-    All other systems reviewed and negative  Unable to obtain/Limited due to:      PHYSICAL EXAM:    Vital Signs Last 24 Hrs  T(C): 36.1 (09 Sep 2019 05:13), Max: 36.8 (09 Sep 2019 00:32)  T(F): 97 (09 Sep 2019 05:13), Max: 98.2 (09 Sep 2019 00:32)  HR: 93 (09 Sep 2019 05:13) (69 - 93)  BP: 130/40 (09 Sep 2019 05:42) (113/30 - 140/31)  BP(mean): --  RR: 20 (09 Sep 2019 00:32) (20 - 20)  SpO2: 97% (09 Sep 2019 05:13) (94% - 97%)  Daily     Daily Weight in k.1 (09 Sep 2019 05:13)    PPSV2:   %  FAST:    General:  Mental Status:  HEENT:  Lungs:  Cardiac:  GI:  :  Ext:  Neuro:      LABS:                        9.2    0.58  )-----------( 25       ( 08 Sep 2019 06:40 )             29.1         140  |  100  |  58<H>  ----------------------------<  110<H>  4.5   |  29  |  3.19<H>    Ca    8.4<L>      08 Sep 2019 06:40      PT/INR - ( 08 Sep 2019 06:40 )   PT: 31.4 sec;   INR: 2.74 ratio           Albumin: Albumin, Serum: 1.6 g/dL ( @ 07:10)      Allergies    Zosyn (Rash)    Intolerances      MEDICATIONS  (STANDING):  acetaminophen   Tablet .. 650 milliGRAM(s) Oral every 12 hours  buDESOnide    Inhalation Suspension 0.5 milliGRAM(s) Inhalation two times a day  cefepime  Injectable. 1000 milliGRAM(s) IV Push daily  diltiazem    Tablet 60 milliGRAM(s) Oral every 6 hours  epoetin nelly Injectable 98665 Unit(s) IV Push <User Schedule>  fentaNYL   Patch  12 MICROgram(s)/Hr 1 Patch Transdermal every 72 hours  filgrastim-sndz Injectable 480 MICROGram(s) SubCutaneous daily  finasteride 5 milliGRAM(s) Oral daily  fluticasone propionate 50 MICROgram(s)/spray Nasal Spray 1 Spray(s) Both Nostrils daily  leucovorin 50 milliGRAM(s) Oral every 8 hours  lidocaine   Patch 1 Patch Transdermal daily  nystatin    Suspension 279769 Unit(s) Swish and Swallow every 6 hours  pantoprazole  Injectable 40 milliGRAM(s) IV Push two times a day  vancomycin    Solution 125 milliGRAM(s) Oral every 6 hours    MEDICATIONS  (PRN):  acetaminophen   Tablet .. 650 milliGRAM(s) Oral every 6 hours PRN Temp greater or equal to 38C (100.4F), Mild Pain (1 - 3)  ALBUTerol    90 MICROgram(s) HFA Inhaler 2 Puff(s) Inhalation every 6 hours PRN Shortness of Breath and/or Wheezing      RADIOLOGY/ADDITIONAL STUDIES: HPI: Mr. Yost is an 84y old Male coming from [facility] with hx of ESRD on HD (MWF), HTN, RA on MTX and steroids, R AKA, dCHF, severe CDI and megacolon, Severe PAD s/p LLE endarterectomy complicated by left groin infections s/p Tx and muscle flap, recent Dx of NSCLC not treated yet and planned for XRT this month, 5th hospitalization in a year, sent from [facility]. Admitted  due to fever up to 104F and black tarry stools this am as well as dry blood in mouth. Found to have hypotension, fever, and tachycardia on admission, attributed to sepsis from finding of RUL/RLL pna on CTCAP. Also course c/b finding of pancytopenia with coagulopathy with INR>8, guaiac +, (s/p vit k, kcentra, PRBCs, neupogen, and platelets, likely due to myelosuppression from MTX. Imaging also showed increased in size RML mass and concern for rectal mass, deemed high risk for procedure. Palliative Care consulted to assist with establishing GOC.     Met Mr. Yost this am, daughters Anamika and Eileen at bedside, with 2 SILs also present. Pt awake, alert, cooperative but confused. He was able to say his name and where he is but unable to give date or any details of care thus far. He denied pain at first, but when transport came to take pt for dialysis he yelled in pain, requiring postponement of transport and family request for IV pain medication to aid with comfort. Plan to have GOC conversation once pt leaves for dialysis. See GOC note that will follow.     PAIN: (x )Yes   ( )No  Level: mod-sev  Location: lower back  Intensity:    /10- unable to say, unable to describe further    DYSPNEA: ( ) Yes  (x ) No  Level: denies    PAST MEDICAL & SURGICAL HISTORY:  Rheumatoid arthritis  Above knee amputation of right lower extremity  Recurrent Clostridium difficile diarrhea  Diastolic CHF  Peripheral vascular disease  Afib  Anemia  CKD (chronic kidney disease)  COPD (chronic obstructive pulmonary disease)  SHAYY (obstructive sleep apnea)  Sepsis, due to unspecified organism: 2/2 poorly healing wounds b/l  Dyspepsia: On moderate exertion.  Sleep apnea, obstructive: Requires home 02 therapy, and treatment with BIPAP  Atelectasis  Pleural effusion, bilateral  Respiratory failure  Peripheral edema  CRI (chronic renal insufficiency)  Gout  Benign prostatic hypertrophy  Spinal stenosis  Hypercholesterolemia  GERD (gastroesophageal reflux disease)  CAD (coronary artery disease)  Hypertension  S/P angioplasty with stent  Cataract of left eye  Prostate: Surgery green light procedure.  S/P rotator cuff surgery: Right  S/P angioplasty      SOCIAL HX:     Hx opiate tolerance (x )YES  ( )NO    Baseline ADLs  (Prior to Admission)  ( ) Independent   (x )Dependent    FAMILY HISTORY:  Family history of colorectal cancer  Family history of diabetes mellitus (Sibling)  Family history of hypertension      Review of Systems:  Nausea- denies    Otherwise unable to gather 2/2 to confusion      PHYSICAL EXAM:    Vital Signs Last 24 Hrs  T(C): 36.1 (09 Sep 2019 05:13), Max: 36.8 (09 Sep 2019 00:32)  T(F): 97 (09 Sep 2019 05:13), Max: 98.2 (09 Sep 2019 00:32)  HR: 93 (09 Sep 2019 05:13) (69 - 93)  BP: 130/40 (09 Sep 2019 05:42) (113/30 - 140/31)  RR: 20 (09 Sep 2019 00:32) (20 - 20)  SpO2: 97% (09 Sep 2019 05:13) (94% - 97%)  Daily Weight in k.1 (09 Sep 2019 05:13)    PPSV2:  20-30 %    General: Elderly male lying in bed, awake, alert, confused, but pleasant and cooperative  Mental Status: AOx2 (self and place)  HEENT: dmm, +mucosal ulcers most prominent along left edge of upper lip, nc in place  Lungs: dec at base bl  Cardiac: +s1 s2 rrr  GI: soft mild distention, +bs, nt  : incontinent  Ext: RAKA, chronic stasis changes over LLE  Neuro: confused but no focal deficits      LABS:                        9.2    0.58  )-----------( 25       ( 08 Sep 2019 06:40 )             29.1     -    140  |  100  |  58<H>  ----------------------------<  110<H>  4.5   |  29  |  3.19<H>    Ca    8.4<L>      08 Sep 2019 06:40      PT/INR - ( 08 Sep 2019 06:40 )   PT: 31.4 sec;   INR: 2.74 ratio      Albumin: Albumin, Serum: 1.6 g/dL ( @ 07:10)      Allergies  Zosyn (Rash)  Intolerances      MEDICATIONS  (STANDING):  acetaminophen   Tablet .. 650 milliGRAM(s) Oral every 12 hours  buDESOnide    Inhalation Suspension 0.5 milliGRAM(s) Inhalation two times a day  cefepime  Injectable. 1000 milliGRAM(s) IV Push daily  diltiazem    Tablet 60 milliGRAM(s) Oral every 6 hours  epoetin nelly Injectable 56764 Unit(s) IV Push <User Schedule>  fentaNYL   Patch  12 MICROgram(s)/Hr 1 Patch Transdermal every 72 hours  filgrastim-sndz Injectable 480 MICROGram(s) SubCutaneous daily  finasteride 5 milliGRAM(s) Oral daily  fluticasone propionate 50 MICROgram(s)/spray Nasal Spray 1 Spray(s) Both Nostrils daily  leucovorin 50 milliGRAM(s) Oral every 8 hours  lidocaine   Patch 1 Patch Transdermal daily  nystatin    Suspension [id number] Unit(s) Swish and Swallow every 6 hours  pantoprazole  Injectable 40 milliGRAM(s) IV Push two times a day  vancomycin    Solution 125 milliGRAM(s) Oral every 6 hours    MEDICATIONS  (PRN):  acetaminophen   Tablet .. 650 milliGRAM(s) Oral every 6 hours PRN Temp greater or equal to 38C (100.4F), Mild Pain (1 - 3)  ALBUTerol    90 MICROgram(s) HFA Inhaler 2 Puff(s) Inhalation every 6 hours PRN Shortness of Breath and/or Wheezing      RADIOLOGY/ADDITIONAL STUDIES:    EXAM:  CT ABDOMEN AND PELVIS                        EXAM:  CT CHEST                          *** ADDENDUM 2019  ***    This addendum is dated 2019 at 12:49 PM.    There is some edema in the right paracolic gutter and adjacent to the   mesentery of the ascending colon. There is no colonic wall thickening in   this region but the possibility of mild colitis involving the ascending   colon is considered.    Differential diagnosis for the appearance of the focal thickening of the   right lateral wall of the rectum is focal proctitis or tumor. Again   direct visualization is recommended when clinically feasible.      *** END OF ADDENDUM 2019  ***  IMPRESSION: Increasein size of the right middle lobe lung mass.    New opacities in the right upper lobe and right lower lobe likely on the   basis of pneumonia. Tumor infiltration not excluded.    Areas of atelectasis involving the left lower lobe and lingula now noted.    2 subcutaneous nodules left anterior chest wall one of which is new and   for which metastatic disease is in the differential diagnosis.    Distended colon containing stool and air.    Asymmetric wall thickening right lateral rectum. This could represent   focal proctitis, however, direct visualization is suggested when   clinically feasible.    JOMAR CARR M.D., ATTENDING RADIOLOGIST  This document has been electronically signed. Sep  1 2019 12:40PM  Addend:  JOMAR CARR M.D., ATTENDING RADIOLOGIST  This addendum was electronically signed on: Sep  1 2019 12:51PM.    EXAM:  XR SHOULDER COMP MIN 2V RT                        PROCEDURE DATE:  2019      Impression:    Separation of AC joint.    Evidence of prior shoulder surgery, likely associated rotator cuff injury.    Right lower lung disease, as seen in the prior CT scan of 2090 and   chest radiograph of 2019.    HUNG HENRY M.D., ATTENDING RADIOLOGIST  This document has been electronically signed. Sep  6 2019  9:26AM    EXAM:  US DPLX UPR EXT VEINS LTD RT                        PROCEDURE DATE:  2019      IMPRESSION:     No evidence of right upper extremity deep venous thrombosis.    ELDA DONOHUE   This document has been electronically signed. Sep  9 2019  9:11AM

## 2019-09-09 NOTE — GOALS OF CARE CONVERSATION - PERSONAL ADVANCE DIRECTIVE - WHAT MATTERS MOST
Comfort and being sure about what they decide on their father's behalf. It seems the pt has made his wishes known, but family is coming to terms with the best way to honor them.

## 2019-09-09 NOTE — CONSULT NOTE ADULT - REASON FOR ADMISSION
fever, tarry stools

## 2019-09-10 LAB
CULTURE RESULTS: SIGNIFICANT CHANGE UP
CULTURE RESULTS: SIGNIFICANT CHANGE UP
INR BLD: 4.07 RATIO — HIGH (ref 0.88–1.16)
MTX SERPL-SCNC: <0.04 UMOL/L — LOW (ref 0.5–5)
MTX SERPL-SCNC: <0.04 UMOL/L — LOW (ref 0.5–5)
PROTHROM AB SERPL-ACNC: 47.2 SEC — HIGH (ref 10–12.9)
SPECIMEN SOURCE: SIGNIFICANT CHANGE UP
SPECIMEN SOURCE: SIGNIFICANT CHANGE UP

## 2019-09-10 RX ORDER — HYDROMORPHONE HYDROCHLORIDE 2 MG/ML
0.2 INJECTION INTRAMUSCULAR; INTRAVENOUS; SUBCUTANEOUS
Refills: 0 | Status: DISCONTINUED | OUTPATIENT
Start: 2019-09-10 | End: 2019-09-16

## 2019-09-10 RX ORDER — HYDROMORPHONE HYDROCHLORIDE 2 MG/ML
0.5 INJECTION INTRAMUSCULAR; INTRAVENOUS; SUBCUTANEOUS
Refills: 0 | Status: DISCONTINUED | OUTPATIENT
Start: 2019-09-10 | End: 2019-09-14

## 2019-09-10 RX ORDER — FLUCONAZOLE 150 MG/1
100 TABLET ORAL
Refills: 0 | Status: DISCONTINUED | OUTPATIENT
Start: 2019-09-11 | End: 2019-09-16

## 2019-09-10 RX ADMIN — FENTANYL CITRATE 1 PATCH: 50 INJECTION INTRAVENOUS at 13:37

## 2019-09-10 RX ADMIN — LIDOCAINE 1 PATCH: 4 CREAM TOPICAL at 21:47

## 2019-09-10 RX ADMIN — HYDROMORPHONE HYDROCHLORIDE 0.5 MILLIGRAM(S): 2 INJECTION INTRAMUSCULAR; INTRAVENOUS; SUBCUTANEOUS at 20:53

## 2019-09-10 RX ADMIN — FENTANYL CITRATE 1 PATCH: 50 INJECTION INTRAVENOUS at 05:06

## 2019-09-10 RX ADMIN — PANTOPRAZOLE SODIUM 40 MILLIGRAM(S): 20 TABLET, DELAYED RELEASE ORAL at 06:11

## 2019-09-10 RX ADMIN — FENTANYL CITRATE 1 PATCH: 50 INJECTION INTRAVENOUS at 13:38

## 2019-09-10 RX ADMIN — FENTANYL CITRATE 1 PATCH: 50 INJECTION INTRAVENOUS at 09:20

## 2019-09-10 RX ADMIN — Medication 125 MILLIGRAM(S): at 06:12

## 2019-09-10 RX ADMIN — HYDROMORPHONE HYDROCHLORIDE 0.2 MILLIGRAM(S): 2 INJECTION INTRAMUSCULAR; INTRAVENOUS; SUBCUTANEOUS at 10:15

## 2019-09-10 RX ADMIN — HYDROMORPHONE HYDROCHLORIDE 0.5 MILLIGRAM(S): 2 INJECTION INTRAMUSCULAR; INTRAVENOUS; SUBCUTANEOUS at 21:19

## 2019-09-10 RX ADMIN — Medication 60 MILLIGRAM(S): at 18:07

## 2019-09-10 RX ADMIN — Medication 650 MILLIGRAM(S): at 18:07

## 2019-09-10 RX ADMIN — LIDOCAINE 1 PATCH: 4 CREAM TOPICAL at 13:38

## 2019-09-10 RX ADMIN — Medication 60 MILLIGRAM(S): at 06:12

## 2019-09-10 RX ADMIN — FENTANYL CITRATE 1 PATCH: 50 INJECTION INTRAVENOUS at 21:19

## 2019-09-10 RX ADMIN — Medication 650 MILLIGRAM(S): at 06:11

## 2019-09-10 RX ADMIN — HYDROMORPHONE HYDROCHLORIDE 0.2 MILLIGRAM(S): 2 INJECTION INTRAMUSCULAR; INTRAVENOUS; SUBCUTANEOUS at 18:07

## 2019-09-10 RX ADMIN — HYDROMORPHONE HYDROCHLORIDE 0.5 MILLIGRAM(S): 2 INJECTION INTRAMUSCULAR; INTRAVENOUS; SUBCUTANEOUS at 06:09

## 2019-09-10 NOTE — PROGRESS NOTE ADULT - ASSESSMENT
85 yo w ESRD on HD ( MWF) , HTN, CDI/megalocolon hx , PAD w Right AKA, recent dx of NSLC not yet treated - was for planned radiation therapy after rehab completed at [facility], Now sent in c/o weakness, w fever 104, black tarry stools and blood clots in his mouth per his daughter. In ED hb was 7-->6's w hypotension SBP 70's and hyperkalemia K 6.1. Dtr reports this week pt required extra HD/PUF due to fluid overload and hypotension.      ESRD on HD w Hyperkalemia    in setting of  GIB /blood loss s/p PRBC - 2 units    arrange for regular HD today w PRBC x 1 unit   Uf as bp allows- 2 L today         Anemia w coumadin coagulopathy /severe neutropenia/pancytopenia   while on MTX for RA   PRBC at HD as above   correct INR per Medicine    GI    await heme - started on filgrastim   DC allopurinol    DC MTX      Fevers w neutropenia w GNR bactaremia    -  Iv abx per ID      d/w HD RN   d/w SICU RN      Thank you for the courtesy of this consult. We will follow this patient with you.   Management is subject to change if new information becomes available or patient condition changes.    9/3  ESRD  HCAP  E coli sepsis most likely bowel origin  ID input noted  s/p HD 9/2  No need for hd today, scheduled for am    9/4 SY  --ESRD : continue TIW HD.  --Sepsis : continue IV abtx and Po vanco per ID  --Pancytopenia : multifactorial with recent MTX therapy.    D/w Heme : Will use High Flux dialyzer in an attempt to increase MTX clearance.  --Nutrition: change to regular diet for now to maintain adequate po intake.    9/5 SY  --ESRD : Tolerated tx fairly ok yesterday.  Tx with High Flux dialyzer to increased Methotrexate clearance.  Follow up repeat MTX level.  --Pancytopenia with BM suppression due to meds.  : follow up MTX level.   --Nutrition : on Regular diet.  Follow electrolytes closely.  --Sepsis : Continue IV abtx and Po vanco.    9/6  seen on HD   MTX level 0.05  platelet transfusion as ordered  hypophophatemia  neutra phos bid x 2 days, follow levels   not on PO4 binder    9/7 MK   - ESRD MWF   - pancytopenia due to myelosuppresion from MTX    on neupogen/ s/p plts transfusion   - anemia; epo with hd   - melena with concern for possible colitis: not candidate for endoscopic eval   - hypophos: po replacement fu trend   - pain: start low dose fentanyl patch and continue with percocet    decub ulcer     9/8 MK   - ESRD MWF   - pancytopenia due to myelosuppresion from MTX    s/p neupogen/ s/p plts transfusion     ? leukovorin  - anemia; epo with hd   - melena with concern for possible colitis: not candidate for endoscopic eval   - hypophos: po replacement fu trend   - pain: start low dose fentanyl patch and dc dilaudid and percocet       decub ulcer   dw rn     9/9 SY  --ESRD : BP in 80's.  Will treat for 3 hours today.  D/w Pt's daughter and son.  Palliative medicine evaluation and follow up noted.  D/w family at length re : withdrawal from dialysis.  All questions answered.  Daughter states family will likely come to a decision later today.    9/10 SY  As above.  Family likely to decide withdrawal from dialysis.  Will confirm decision in am.  --Pain control acceptable at present.

## 2019-09-10 NOTE — PROGRESS NOTE ADULT - ASSESSMENT
84 y.o. Male with PMHx of ESRD on HD (MWF), HTN, RA on MTX and steroids, R AKA, Hx of severe CDI and megacolon, Severe PAD s/p LLE endarterectomy complicated by left groin infections s/p Tx and muscle flap, recent Dx of NSCLC in 5/2019  now admitted for Gram negative Neutropenic Sepsis compliated by coagulopathy and multifactorial anemia secondary to blood loss.     Suspect MTX induced myelosuppression   patient went to  HD earlier today and was noted to have difficulty tolerating session due to hypotension   Methotrexate Level, Serum: <0.04  CBC suggestive of early hematologic recovery   Discussed with daughter and palliative care and consideration of HD discontinuation   no additional labs to be drawn and patient was made DNR/DNI

## 2019-09-10 NOTE — PROGRESS NOTE ADULT - ASSESSMENT
Assessment/Plan  84 y.o. Male with PMHx of ESRD on HD (MWF), HTN, RA on MTX and steroids, R AKA, Hx of severe CDI and megacolon, Severe PAD s/p LLE endarterectomy complicated by left groin infections s/p Tx and muscle flap, anemia of chronic dx s/p transfusions in the past, COPD/SHAYY, diastolic CHF, GERD, Gout, HLD, recent Dx of NSCLC not treated yet and planned for XRT this month sent from SNF due to fever up to 104F and black tarry stools this am as well as dry blood in mouth. Pt was lethargic day prior, c/o lower back pain. Found to have coagulopathy with INR>8, guaiac +, severe neutropenia, RUL/RLL infiltrates, increased in size RML mass. In ED pt was febrile, tachycardic and hypotensive, but rejected by intensivist for admission to MICU.  At the time of evaluation pt is more alert, shivering, c/o lower back pain with slightly improved BP.    #Sepsis due to right sided PNA likely with gram negative organisms in the settings of immunosuppression on methotrexate, steroids and progressive NSCLC of RML  #Gram-negative bacteremia  for hospice  dc abx c/w fluconazole while he is eating/ severe thrush    #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  s/p methotrexate for inflammatory Arthritis   hospice eval  prn analgesia        #ESRD   HD was stopped

## 2019-09-10 NOTE — PROGRESS NOTE ADULT - ASSESSMENT
84y old Male coming from [facility] with hx of ESRD on HD (MWF), HTN, RA on MTX and steroids, R AKA, dCHF, severe CDI and megacolon, Severe PAD s/p LLE endarterectomy complicated by left groin infections s/p Tx and muscle flap, recent Dx of NSCLC not treated yet and planned for XRT this month, 5th hospitalization in a year, sent from [facility]. Admitted 8/1 due to fever up to 104F and black tarry stools this am as well as dry blood in mouth. Found to have hypotension, fever, and tachycardia on admission, attributed to sepsis from finding of RUL/RLL pna on CTCAP. Also course c/b finding of pancytopenia with coagulopathy with INR>8, guaiac +, (s/p vit k, kcentra, PRBCs, neupogen, and platelets, likely due to myelosuppression from MTX. Imaging also showed increased in size RML mass and concern for rectal mass, deemed high risk for procedure. Palliative Care consulted to assist with establishing GOC.     1) Pain  - lower back pain, and sacrum (developing decubitus) hx of sig RA on MTX  - agree with continuing with low dose fentanyl patch as this is the wish of the family and safest opioid in setting of ESRD  - having breakthrough pain--> suggest IV dilaudid 0.2 mg q3h IV prn mod (lower dose to hopefully provide relief with less sedation)   - maintain 0.5mg q3h IV prn severe sx    2) AMS  - likely due to delirium  - will wax and wane  - fall and aspiration precautions  - frequent reorientation  - some signs of agitation this am--> added IV ativan 0.25mg q4h prn    3) Sepsis  - on IV abx for PNa on imaging  - improved  - likely due to immunosuppression increasing risk for opportunistic infections  - seems to also have thrush as well, diflucan started yesterday    4) Pancytopenia  - heme notes appreciated  - most likely due to myelosuppression from MTX  - s/p vitK, s/p Kcentra, s/p PRBCs, s/p neupogen  - platelets still quite low    5) GIB  - GI notes appreciated  - high risk for endoscopy  - supportive care in the interim with transfusions as needed  - H/H appears stable    6) ESRD  - nephrology notes appreciated  - some trouble in HD yesterday  - conversation held with Dr. Zamora and family, seem to agree that stopping HD would be most comfortable way to allow pt to die peacefully  - family awaiting last sister's arrival (she is also HCP1) to finalize this choice, but seem to have made up their mind    7) NSCLC  - supposed to initiate XRT but admitted and in EMMANUEL  - unclear plan for this  - suggestion of progression on imaging since dx 5/2019  - unlikely to be able to handle tx for this and pt previously giving family impression that he was not interested (see goc note for details on this)    8) Debility  - PPS<40%  - nutrition notes appreciated- severe protein calorie malnutrition noted  - PT notes appreciated- EMMANUEL recommended  - Greg EMMANUEL notes appreciated- needed assistance for ADLs and many activities did not occur    9) Prognosis  - appears poor  - in light of multiple significant comorbidities including ESRD on HD, severe vasculopathy (already s/p RAKA), myelosuppression and progressive newly dx NSCLC, significant debility with multiple hospitalizations, albumin 1.6, severe protein calorie malnutrition, PPS<40%. Pt would only be candidate for hospice if decision made to stop dialysis    10) GoC/Advanced Directives  - pt does not appear to have capacity for decision making at this time due to likely delirium, which may wax and wane  - HCP on file naming daughters: 1) Lore Danielito [phone]; and 2) Anamika YostLeticia [phone]  - MOLST completed 9/9/19- DNR and DNI  - GOC meeting held 9/9- moving toward comfort focus, awaiting last sister to finalize cessation of dialysis and pursuit of inpt hospice if pt is stable enough to go. Pt appears to be starting dying process. Family knows we will take each day one day at a time when helping them make decisions on their father's behalf, keeping his comfort at the forefront. They agreed to stop blood draws and imaging today and we will touch base in am to see if pt is stable enough to consider transfer to inpt hospice facility.     Thank you for including us in Mr. Yost's care. Will continue to follow with you.    Ronald Franco MD  Palliative Care Attending

## 2019-09-10 NOTE — PROGRESS NOTE ADULT - ASSESSMENT
84 y.o. Male with PMHx of ESRD on HD (MWF), HTN, RA on MTX and steroids, R AKA, Hx of severe CDI and megacolon, Severe PAD s/p LLE endarterectomy complicated by left groin infections s/p Tx and muscle flap, recent Dx of NSCLC not treated yet and planned for XRT this month admitted on 9/1 from [facility] for evaluation of fever to 104 and black tarry stools as well as dry blood in mouth; patient had been very lethargic over preceding 24 hours; he has complaints of lower back pain. Is tremulous awake, alert; history per family at bedside and medical record. Upon admission had elevated INR, neutropenia and extremely low hemoglobin.     1. Patient admitted with GI bleeding, neutropenia, coagulopathy and profound anemia. Also noted with pneumonia which will treat as health care associated pneumonia given that patient admitted from snf  - patient at risk for gram negative rods and other resistant bacteria --- found to have E coli in blood cultures  - oxygen and nebs as needed   - serial cbc and monitor temperature   - iv hydration and supportive care   - reviewed prior medical records to evaluate for resistant or atypical pathogens ---- had ESBL Klebsiella pneumoniae in past, this admission has E coli  - day #9 cefepime; has difficulty swallowing, therefore will continue with iv cefepime, however can eventually switch to oral antibiotics to complete 14 days rx  - tolerating antibiotics without rashes or side effects   - Monitor closely in view of history of penicillin allergy   - on diflucan for oral thrush, dose q 48 hours given patient is dialysis patient  -  po vancomycin to prevent cdiff  - dialysis per schedule  - lung mass to be addressed at some point, the pneumonia most likely has component of post obstructive pneumonia as well  - will repeat blood cultures to ensure clearance of bacteria--- are clear  2. other issues: per medicine

## 2019-09-11 RX ADMIN — Medication 0.25 MILLIGRAM(S): at 00:38

## 2019-09-11 RX ADMIN — HYDROMORPHONE HYDROCHLORIDE 0.5 MILLIGRAM(S): 2 INJECTION INTRAMUSCULAR; INTRAVENOUS; SUBCUTANEOUS at 03:43

## 2019-09-11 RX ADMIN — LIDOCAINE 1 PATCH: 4 CREAM TOPICAL at 23:40

## 2019-09-11 RX ADMIN — LIDOCAINE 1 PATCH: 4 CREAM TOPICAL at 02:24

## 2019-09-11 RX ADMIN — HYDROMORPHONE HYDROCHLORIDE 0.5 MILLIGRAM(S): 2 INJECTION INTRAMUSCULAR; INTRAVENOUS; SUBCUTANEOUS at 11:41

## 2019-09-11 RX ADMIN — LIDOCAINE 1 PATCH: 4 CREAM TOPICAL at 11:43

## 2019-09-11 RX ADMIN — HYDROMORPHONE HYDROCHLORIDE 0.5 MILLIGRAM(S): 2 INJECTION INTRAMUSCULAR; INTRAVENOUS; SUBCUTANEOUS at 18:47

## 2019-09-11 RX ADMIN — HYDROMORPHONE HYDROCHLORIDE 0.5 MILLIGRAM(S): 2 INJECTION INTRAMUSCULAR; INTRAVENOUS; SUBCUTANEOUS at 03:14

## 2019-09-11 RX ADMIN — LIDOCAINE 1 PATCH: 4 CREAM TOPICAL at 22:50

## 2019-09-11 RX ADMIN — FENTANYL CITRATE 1 PATCH: 50 INJECTION INTRAVENOUS at 20:36

## 2019-09-11 RX ADMIN — FENTANYL CITRATE 1 PATCH: 50 INJECTION INTRAVENOUS at 08:10

## 2019-09-11 RX ADMIN — FLUCONAZOLE 50 MILLIGRAM(S): 150 TABLET ORAL at 21:16

## 2019-09-11 NOTE — PROGRESS NOTE ADULT - ASSESSMENT
84y old Male coming from [facility] with hx of ESRD on HD (MWF), HTN, RA on MTX and steroids, R AKA, dCHF, severe CDI and megacolon, Severe PAD s/p LLE endarterectomy complicated by left groin infections s/p Tx and muscle flap, recent Dx of NSCLC not treated yet and planned for XRT this month, 5th hospitalization in a year, sent from [facility]. Admitted 8/1 due to fever up to 104F and black tarry stools this am as well as dry blood in mouth. Found to have hypotension, fever, and tachycardia on admission, attributed to sepsis from finding of RUL/RLL pna on CTCAP. Also course c/b finding of pancytopenia with coagulopathy with INR>8, guaiac +, (s/p vit k, kcentra, PRBCs, neupogen, and platelets, likely due to myelosuppression from MTX. Imaging also showed increased in size RML mass and concern for rectal mass, deemed high risk for procedure. Palliative Care consulted to assist with establishing GOC.     1) Pain  - lower back pain, and sacrum (developing decubitus) hx of sig RA on MTX  - agree with continuing with low dose fentanyl patch as this is the wish of the family and safest opioid in setting of ESRD  - c/w IV dilaudid 0.2 mg q3h IV prn mod (lower dose to hopefully provide relief with less sedation)   - maintain 0.5mg q3h IV prn severe sx    2) AMS  - likely due to delirium  - will wax and wane  - fall and aspiration precautions  - frequent reorientation  - c/w IV ativan 0.25mg q4h prn    3) Sepsis  - on IV abx for PNa on imaging  - improved  - likely due to immunosuppression increasing risk for opportunistic infections  - seems to also have thrush as well, diflucan started yesterday    4) Pancytopenia  - heme notes appreciated  - most likely due to myelosuppression from MTX  - s/p vitK, s/p Kcentra, s/p PRBCs, s/p neupogen  - platelets still quite low    5) GIB  - GI notes appreciated  - high risk for endoscopy  - supportive care in the interim with transfusions as needed  - H/H appears stable    6) ESRD  - nephrology notes appreciated  - some trouble in HD yesterday  - conversation held with Dr. Zamora and family, seem to agree that stopping HD would be most comfortable way to allow pt to die peacefully  - family confirmed decision to stop dialysis    7) NSCLC  - supposed to initiate XRT but admitted and in EMMANUEL  - unclear plan for this  - suggestion of progression on imaging since dx 5/2019  - unlikely to be able to handle tx for this and pt previously giving family impression that he was not interested (see goc note for details on this)    8) Debility  - PPS<40%  - nutrition notes appreciated- severe protein calorie malnutrition noted  - PT notes appreciated- EMMANUEL recommended  - Greg EMMANUEL notes appreciated- needed assistance for ADLs and many activities did not occur    9) Prognosis  - appears poor  - in light of multiple significant comorbidities including ESRD on HD, severe vasculopathy (already s/p RAKA), myelosuppression and progressive newly dx NSCLC, significant debility with multiple hospitalizations, albumin 1.6, severe protein calorie malnutrition, PPS<40%. Now that dialysis is stopped, fits criteria for hospice    10) GoC/Advanced Directives  - pt does not appear to have capacity for decision making at this time due to likely delirium, which may wax and wane  - HCP on file naming daughters: 1) Lore Esteves [phone]; and 2) Anamika Yost-Maged [phone]  [facility]  - GOC meeting held 9/9 and on-going since- family now on board with stopping dialysis and full comfort measures. Not yet ready to consider hospice transition, but open to discussing this again in the coming days. Pt imminently dying    Thank you for including us in Mr. Yost's care. Will continue to follow with you.    Ronald Franco MD  Palliative Care Attending

## 2019-09-11 NOTE — PROGRESS NOTE ADULT - ASSESSMENT
Assessment/Plan  84 y.o. Male with PMHx of ESRD on HD (MWF), HTN, RA on MTX and steroids, R AKA, Hx of severe CDI and megacolon, Severe PAD s/p LLE endarterectomy complicated by left groin infections s/p Tx and muscle flap, anemia of chronic dx s/p transfusions in the past, COPD/SHAYY, diastolic CHF, GERD, Gout, HLD, recent Dx of NSCLC not treated yet and planned for XRT this month sent from [facility] due to fever up to 104F and black tarry stools this am as well as dry blood in mouth. Pt was lethargic day prior, c/o lower back pain. Found to have coagulopathy with INR>8, guaiac +, severe neutropenia, RUL/RLL infiltrates, increased in size RML mass. In ED pt was febrile, tachycardic and hypotensive, but rejected by intensivist for admission to MICU.  At the time of evaluation pt is more alert, shivering, c/o lower back pain with slightly improved BP.    #Sepsis due to right sided PNA likely with gram negative organisms in the settings of immunosuppression on methotrexate, steroids and progressive NSCLC of RML  #Gram-negative bacteremia  comfort measures,  hospice care, family does not want patient to moved to the hospice  dc abx c/w fluconazole while he is eating/ severe thrush    #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  s/p methotrexate for inflammatory Arthritis   hospice eval  prn analgesia    #ESRD   HD was stopped      Dispo - comfort measures   Palliative team input appreciated

## 2019-09-11 NOTE — PROGRESS NOTE ADULT - ASSESSMENT
84 y.o. Male with PMHx of ESRD on HD (MWF), HTN, RA on MTX and steroids, R AKA, Hx of severe CDI and megacolon, Severe PAD s/p LLE endarterectomy complicated by left groin infections s/p Tx and muscle flap, anemia of chronic dx s/p transfusions in the past, COPD/SHAYY, diastolic CHF, GERD, Gout, HLD, recent Dx of NSCLC not treated yet and planned for XRT this month sent from SNF due to fever up to 104F and black tarry stools this am as well as dry blood in mouth. Pt was lethargic day prior, c/o lower back pain. Found to have coagulopathy with INR>8, guaiac +, severe neutropenia, RUL/RLL infiltrates, increased in size RML mass. In ED pt was febrile, tachycardic and hypotensive, but rejected by intensivist for admission to MICU.  At the time of evaluation pt is more alert, shivering, c/o lower back pain with slightly improved BP.      comf and would like comf measures

## 2019-09-11 NOTE — PROGRESS NOTE ADULT - ASSESSMENT
85 yo w ESRD on HD ( MWF) , HTN, CDI/megalocolon hx , PAD w Right AKA, recent dx of NSLC not yet treated - was for planned radiation therapy after rehab completed at [facility], Now sent in c/o weakness, w fever 104, black tarry stools and blood clots in his mouth per his daughter. In ED hb was 7-->6's w hypotension SBP 70's and hyperkalemia K 6.1. Dtr reports this week pt required extra HD/PUF due to fluid overload and hypotension.      ESRD on HD w Hyperkalemia    in setting of  GIB /blood loss s/p PRBC - 2 units    arrange for regular HD today w PRBC x 1 unit   Uf as bp allows- 2 L today         Anemia w coumadin coagulopathy /severe neutropenia/pancytopenia   while on MTX for RA   PRBC at HD as above   correct INR per Medicine    GI    await heme - started on filgrastim   DC allopurinol    DC MTX      Fevers w neutropenia w GNR bactaremia    -  Iv abx per ID      d/w HD RN   d/w SICU RN      Thank you for the courtesy of this consult. We will follow this patient with you.   Management is subject to change if new information becomes available or patient condition changes.    9/3  ESRD  HCAP  E coli sepsis most likely bowel origin  ID input noted  s/p HD 9/2  No need for hd today, scheduled for am    9/4 SY  --ESRD : continue TIW HD.  --Sepsis : continue IV abtx and Po vanco per ID  --Pancytopenia : multifactorial with recent MTX therapy.    D/w Heme : Will use High Flux dialyzer in an attempt to increase MTX clearance.  --Nutrition: change to regular diet for now to maintain adequate po intake.    9/5 SY  --ESRD : Tolerated tx fairly ok yesterday.  Tx with High Flux dialyzer to increased Methotrexate clearance.  Follow up repeat MTX level.  --Pancytopenia with BM suppression due to meds.  : follow up MTX level.   --Nutrition : on Regular diet.  Follow electrolytes closely.  --Sepsis : Continue IV abtx and Po vanco.    9/6  seen on HD   MTX level 0.05  platelet transfusion as ordered  hypophophatemia  neutra phos bid x 2 days, follow levels   not on PO4 binder    9/7 MK   - ESRD MWF   - pancytopenia due to myelosuppresion from MTX    on neupogen/ s/p plts transfusion   - anemia; epo with hd   - melena with concern for possible colitis: not candidate for endoscopic eval   - hypophos: po replacement fu trend   - pain: start low dose fentanyl patch and continue with percocet    decub ulcer     9/8 MK   - ESRD MWF   - pancytopenia due to myelosuppresion from MTX    s/p neupogen/ s/p plts transfusion     ? leukovorin  - anemia; epo with hd   - melena with concern for possible colitis: not candidate for endoscopic eval   - hypophos: po replacement fu trend   - pain: start low dose fentanyl patch and dc dilaudid and percocet       decub ulcer   jerald rn     9/9 SY  --ESRD : BP in 80's.  Will treat for 3 hours today.  D/w Pt's daughter and son.  Palliative medicine evaluation and follow up noted.  D/w family at length re : withdrawal from dialysis.  All questions answered.  Daughter states family will likely come to a decision later today.    9/10 SY  As above.  Family likely to decide withdrawal from dialysis.  Will confirm decision in am.  --Pain control acceptable at present.    9/11 MK  family has decided to stop HD.  Comfort measures  jerald family at bedside and dr klein

## 2019-09-12 ENCOUNTER — APPOINTMENT (OUTPATIENT)
Dept: RADIATION ONCOLOGY | Facility: CLINIC | Age: 84
End: 2019-09-12

## 2019-09-12 RX ORDER — FENTANYL CITRATE 50 UG/ML
1 INJECTION INTRAVENOUS
Refills: 0 | Status: DISCONTINUED | OUTPATIENT
Start: 2019-09-14 | End: 2019-09-16

## 2019-09-12 RX ADMIN — HYDROMORPHONE HYDROCHLORIDE 0.5 MILLIGRAM(S): 2 INJECTION INTRAMUSCULAR; INTRAVENOUS; SUBCUTANEOUS at 10:30

## 2019-09-12 RX ADMIN — LIDOCAINE 1 PATCH: 4 CREAM TOPICAL at 19:57

## 2019-09-12 RX ADMIN — HYDROMORPHONE HYDROCHLORIDE 0.5 MILLIGRAM(S): 2 INJECTION INTRAMUSCULAR; INTRAVENOUS; SUBCUTANEOUS at 21:55

## 2019-09-12 RX ADMIN — HYDROMORPHONE HYDROCHLORIDE 0.5 MILLIGRAM(S): 2 INJECTION INTRAMUSCULAR; INTRAVENOUS; SUBCUTANEOUS at 10:15

## 2019-09-12 RX ADMIN — LIDOCAINE 1 PATCH: 4 CREAM TOPICAL at 13:37

## 2019-09-12 RX ADMIN — HYDROMORPHONE HYDROCHLORIDE 0.5 MILLIGRAM(S): 2 INJECTION INTRAMUSCULAR; INTRAVENOUS; SUBCUTANEOUS at 15:14

## 2019-09-12 RX ADMIN — HYDROMORPHONE HYDROCHLORIDE 0.5 MILLIGRAM(S): 2 INJECTION INTRAMUSCULAR; INTRAVENOUS; SUBCUTANEOUS at 15:30

## 2019-09-12 RX ADMIN — HYDROMORPHONE HYDROCHLORIDE 0.5 MILLIGRAM(S): 2 INJECTION INTRAMUSCULAR; INTRAVENOUS; SUBCUTANEOUS at 23:08

## 2019-09-12 RX ADMIN — HYDROMORPHONE HYDROCHLORIDE 0.5 MILLIGRAM(S): 2 INJECTION INTRAMUSCULAR; INTRAVENOUS; SUBCUTANEOUS at 06:49

## 2019-09-12 RX ADMIN — FENTANYL CITRATE 1 PATCH: 50 INJECTION INTRAVENOUS at 07:07

## 2019-09-12 RX ADMIN — HYDROMORPHONE HYDROCHLORIDE 0.5 MILLIGRAM(S): 2 INJECTION INTRAMUSCULAR; INTRAVENOUS; SUBCUTANEOUS at 18:18

## 2019-09-12 RX ADMIN — HYDROMORPHONE HYDROCHLORIDE 0.5 MILLIGRAM(S): 2 INJECTION INTRAMUSCULAR; INTRAVENOUS; SUBCUTANEOUS at 07:08

## 2019-09-12 RX ADMIN — HYDROMORPHONE HYDROCHLORIDE 0.5 MILLIGRAM(S): 2 INJECTION INTRAMUSCULAR; INTRAVENOUS; SUBCUTANEOUS at 03:21

## 2019-09-12 RX ADMIN — FENTANYL CITRATE 1 PATCH: 50 INJECTION INTRAVENOUS at 18:50

## 2019-09-12 RX ADMIN — HYDROMORPHONE HYDROCHLORIDE 0.5 MILLIGRAM(S): 2 INJECTION INTRAMUSCULAR; INTRAVENOUS; SUBCUTANEOUS at 02:39

## 2019-09-12 RX ADMIN — HYDROMORPHONE HYDROCHLORIDE 0.5 MILLIGRAM(S): 2 INJECTION INTRAMUSCULAR; INTRAVENOUS; SUBCUTANEOUS at 18:35

## 2019-09-12 NOTE — PROGRESS NOTE ADULT - ASSESSMENT
Assessment/Plan  84 y.o. Male with PMHx of ESRD on HD (MWF), HTN, RA on MTX and steroids, R AKA, Hx of severe CDI and megacolon, Severe PAD s/p LLE endarterectomy complicated by left groin infections s/p Tx and muscle flap, anemia of chronic dx s/p transfusions in the past, COPD/SHAYY, diastolic CHF, GERD, Gout, HLD, recent Dx of NSCLC not treated yet and planned for XRT this month sent from SNF due to fever up to 104F and black tarry stools this am as well as dry blood in mouth. Pt was lethargic day prior, c/o lower back pain. Found to have coagulopathy with INR>8, guaiac +, severe neutropenia, RUL/RLL infiltrates, increased in size RML mass. In ED pt was febrile, tachycardic and hypotensive, but rejected by intensivist for admission to MICU.  At the time of evaluation pt is more alert, shivering, c/o lower back pain with slightly improved BP.    #Sepsis due to right sided PNA likely with gram negative organisms in the settings of immunosuppression on methotrexate, steroids and progressive NSCLC of RML  #Gram-negative bacteremia  comfort measures,  hospice care, family does not want patient to moved to the hospice  dc abx c/w fluconazole while he is eating/ severe thrush    #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  s/p methotrexate for inflammatory Arthritis   hospice eval  prn analgesia    #ESRD   HD was stopped      Dispo - comfort measures   Palliative team input appreciated   patient actively dying, family does not want to move the patient to the hospice , will revisit daily

## 2019-09-12 NOTE — PROGRESS NOTE ADULT - ASSESSMENT
84y old Male coming from [facility] with hx of ESRD on HD (MWF), HTN, RA on MTX and steroids, R AKA, dCHF, severe CDI and megacolon, Severe PAD s/p LLE endarterectomy complicated by left groin infections s/p Tx and muscle flap, recent Dx of NSCLC not treated yet and planned for XRT this month, 5th hospitalization in a year, sent from [facility]. Admitted 8/1 due to fever up to 104F and black tarry stools this am as well as dry blood in mouth. Found to have hypotension, fever, and tachycardia on admission, attributed to sepsis from finding of RUL/RLL pna on CTCAP. Also course c/b finding of pancytopenia with coagulopathy with INR>8, guaiac +, (s/p vit k, kcentra, PRBCs, neupogen, and platelets, likely due to myelosuppression from MTX. Imaging also showed increased in size RML mass and concern for rectal mass, deemed high risk for procedure. Palliative Care consulted to assist with establishing GOC.     1) Pain  - lower back pain, and sacrum (developing decubitus) hx of sig RA on MTX  - agree with continuing with low dose fentanyl patch as this is the wish of the family and safest opioid in setting of ESRD  - c/w IV dilaudid 0.2 mg q3h IV prn mod (lower dose to hopefully provide relief with less sedation)   - maintain 0.5mg q3h IV prn severe sx    2) AMS  - likely due to delirium  - will wax and wane  - fall and aspiration precautions  - frequent reorientation  - c/w IV ativan 0.25mg q4h prn    3) Sepsis  - on IV abx for PNa on imaging  - improved  - likely due to immunosuppression increasing risk for opportunistic infections  - seems to also have thrush as well, diflucan started yesterday    4) Pancytopenia  - heme notes appreciated  - most likely due to myelosuppression from MTX  - s/p vitK, s/p Kcentra, s/p PRBCs, s/p neupogen  - platelets still quite low    5) GIB  - GI notes appreciated  - high risk for endoscopy  - supportive care in the interim  - H/H stable prior to initiation of comfort measures, labs no longer being drawn    6) ESRD  - nephrology notes appreciated  - some trouble in HD few days ago due to hypotension  - conversation held with Dr. Zamora and family, seem to agree that stopping HD would be most comfortable way to allow pt to die peacefully  - family confirmed decision to stop dialysis    7) NSCLC  - supposed to initiate XRT but admitted and in EMMANUEL  - unclear plan for this  - suggestion of progression on imaging since dx 5/2019  - unlikely to be able to handle tx for this and pt previously giving family impression that he was not interested (see goc note for details on this)    8) Debility  - PPS<40%  - nutrition notes appreciated- severe protein calorie malnutrition noted  - PT notes appreciated- EMMANUEL recommended  - Greg EMMANUEL notes appreciated- needed assistance for ADLs and many activities did not occur    9) Prognosis  - appears poor  - in light of multiple significant comorbidities including ESRD on HD, severe vasculopathy (already s/p RAKA), myelosuppression and progressive newly dx NSCLC, significant debility with multiple hospitalizations, albumin 1.6, severe protein calorie malnutrition, PPS<40%. Now that dialysis is stopped, fits criteria for hospice    10) GoC/Advanced Directives  - pt does not appear to have capacity for decision making at this time due to likely delirium, which may wax and wane  - HCP on file naming daughters: 1) Lore Esteves [phone]; and 2) Anamika Darinel [phone]  [facility]  - GOC meeting held 9/9 and on-going since- family now on board with stopping dialysis and full comfort measures. Not yet ready to consider hospice transition, but open to discussing this again in the coming days. Pt imminently dying    Thank you for including us in Mr. Yost's care. Will continue to follow with you.    Ronadl Franco MD  Palliative Care Attending

## 2019-09-12 NOTE — CHART NOTE - NSCHARTNOTEFT_GEN_A_CORE
Clinical Nutrition BRIEF NOTE    *pt is now comfort measures, stopping dialysis, not ready to consider hospice.  Pt imminently dying. Not eating.  *given current status, no further nutrition interventions warranted.      RD remains available.

## 2019-09-12 NOTE — PROGRESS NOTE ADULT - ASSESSMENT
84 y.o. Male with PMHx of ESRD on HD (MWF), HTN, RA on MTX and steroids, R AKA, Hx of severe CDI and megacolon, Severe PAD s/p LLE endarterectomy complicated by left groin infections s/p Tx and muscle flap, anemia of chronic dx s/p transfusions in the past, COPD/SHAYY, diastolic CHF, GERD, Gout, HLD, recent Dx of NSCLC not treated yet and planned for XRT this month sent from [facility] due to fever up to 104F and black tarry stools this am as well as dry blood in mouth. Pt was lethargic day prior, c/o lower back pain. Found to have coagulopathy with INR>8, guaiac +, severe neutropenia, RUL/RLL infiltrates, increased in size RML mass. In ED pt was febrile, tachycardic and hypotensive, but rejected by intensivist for admission to MICU.  At the time of evaluation pt is more alert, shivering, c/o lower back pain with slightly improved BP.      comf and would like comf [id number] y.o. Male with PMHx of ESRD on HD (MWF), HTN, RA on MTX and steroids, R AKA, Hx of severe CDI and megacolon, Severe PAD s/p LLE endarterectomy complicated by left groin infections s/p Tx and muscle flap, anemia of chronic dx s/p transfusions in the past, COPD/SHAYY, diastolic CHF, GERD, Gout, HLD, recent Dx of NSCLC not treated yet and planned for XRT this month sent from [facility] due to fever up to 104F and black tarry stools this am as well as dry blood in mouth. Pt was lethargic day prior, c/o lower back pain. Found to have coagulopathy with INR>8, guaiac +, severe neutropenia, RUL/RLL infiltrates, increased in size RML mass. In ED pt was febrile, tachycardic and hypotensive, but rejected by intensivist for admission to MICU.  At the time of evaluation pt is more alert, shivering, c/o lower back pain with slightly improved BP.      comf and would like comf measures

## 2019-09-13 RX ORDER — ROBINUL 0.2 MG/ML
0.2 INJECTION INTRAMUSCULAR; INTRAVENOUS EVERY 6 HOURS
Refills: 0 | Status: DISCONTINUED | OUTPATIENT
Start: 2019-09-13 | End: 2019-09-16

## 2019-09-13 RX ADMIN — HYDROMORPHONE HYDROCHLORIDE 0.5 MILLIGRAM(S): 2 INJECTION INTRAMUSCULAR; INTRAVENOUS; SUBCUTANEOUS at 16:10

## 2019-09-13 RX ADMIN — HYDROMORPHONE HYDROCHLORIDE 0.5 MILLIGRAM(S): 2 INJECTION INTRAMUSCULAR; INTRAVENOUS; SUBCUTANEOUS at 10:46

## 2019-09-13 RX ADMIN — FENTANYL CITRATE 1 PATCH: 50 INJECTION INTRAVENOUS at 19:40

## 2019-09-13 RX ADMIN — FENTANYL CITRATE 1 PATCH: 50 INJECTION INTRAVENOUS at 14:01

## 2019-09-13 RX ADMIN — HYDROMORPHONE HYDROCHLORIDE 0.5 MILLIGRAM(S): 2 INJECTION INTRAMUSCULAR; INTRAVENOUS; SUBCUTANEOUS at 18:56

## 2019-09-13 RX ADMIN — HYDROMORPHONE HYDROCHLORIDE 0.5 MILLIGRAM(S): 2 INJECTION INTRAMUSCULAR; INTRAVENOUS; SUBCUTANEOUS at 16:40

## 2019-09-13 RX ADMIN — LIDOCAINE 1 PATCH: 4 CREAM TOPICAL at 19:40

## 2019-09-13 RX ADMIN — ROBINUL 0.2 MILLIGRAM(S): 0.2 INJECTION INTRAMUSCULAR; INTRAVENOUS at 23:11

## 2019-09-13 RX ADMIN — HYDROMORPHONE HYDROCHLORIDE 0.5 MILLIGRAM(S): 2 INJECTION INTRAMUSCULAR; INTRAVENOUS; SUBCUTANEOUS at 10:31

## 2019-09-13 RX ADMIN — FENTANYL CITRATE 1 PATCH: 50 INJECTION INTRAVENOUS at 14:03

## 2019-09-13 RX ADMIN — FLUCONAZOLE 50 MILLIGRAM(S): 150 TABLET ORAL at 21:27

## 2019-09-13 RX ADMIN — FENTANYL CITRATE 1 PATCH: 50 INJECTION INTRAVENOUS at 14:04

## 2019-09-13 RX ADMIN — ROBINUL 0.2 MILLIGRAM(S): 0.2 INJECTION INTRAMUSCULAR; INTRAVENOUS at 18:55

## 2019-09-13 RX ADMIN — ROBINUL 0.2 MILLIGRAM(S): 0.2 INJECTION INTRAMUSCULAR; INTRAVENOUS at 12:00

## 2019-09-13 RX ADMIN — HYDROMORPHONE HYDROCHLORIDE 0.5 MILLIGRAM(S): 2 INJECTION INTRAMUSCULAR; INTRAVENOUS; SUBCUTANEOUS at 05:56

## 2019-09-13 RX ADMIN — LIDOCAINE 1 PATCH: 4 CREAM TOPICAL at 00:54

## 2019-09-13 RX ADMIN — LIDOCAINE 1 PATCH: 4 CREAM TOPICAL at 14:00

## 2019-09-13 RX ADMIN — HYDROMORPHONE HYDROCHLORIDE 0.5 MILLIGRAM(S): 2 INJECTION INTRAMUSCULAR; INTRAVENOUS; SUBCUTANEOUS at 21:26

## 2019-09-13 NOTE — PROGRESS NOTE ADULT - ASSESSMENT
84y old Male coming from [facility] with hx of ESRD on HD (MWF), HTN, RA on MTX and steroids, R AKA, dCHF, severe CDI and megacolon, Severe PAD s/p LLE endarterectomy complicated by left groin infections s/p Tx and muscle flap, recent Dx of NSCLC not treated yet and planned for XRT this month, 5th hospitalization in a year, sent from [facility]. Admitted 8/1 due to fever up to 104F and black tarry stools this am as well as dry blood in mouth. Found to have hypotension, fever, and tachycardia on admission, attributed to sepsis from finding of RUL/RLL pna on CTCAP. Also course c/b finding of pancytopenia with coagulopathy with INR>8, guaiac +, (s/p vit k, kcentra, PRBCs, neupogen, and platelets, likely due to myelosuppression from MTX. Imaging also showed increased in size RML mass and concern for rectal mass, deemed high risk for procedure. Palliative Care consulted to assist with establishing GOC.     1) Pain  - lower back pain, and sacrum (developing decubitus) hx of sig RA on MTX  - agree with continuing with low dose fentanyl patch as this is the wish of the family and safest opioid in setting of ESRD  - c/w IV dilaudid 0.2 mg q3h IV prn mod (lower dose to hopefully provide relief with less sedation)   - maintain 0.5mg q3h IV prn severe sx    2) AMS  - likely due to delirium  - will wax and wane  - fall and aspiration precautions  - frequent reorientation  - c/w IV ativan 0.25mg q4h prn    3) cough/secretions  - adding IV glyco 0.2 q6h atc    4) Pancytopenia  - heme notes appreciated  - most likely due to myelosuppression from MTX  - s/p vitK, s/p Kcentra, s/p PRBCs, s/p neupogen  - platelets still quite low    5) GIB  - GI notes appreciated  - high risk for endoscopy  - supportive care in the interim  - H/H stable prior to initiation of comfort measures, labs no longer being drawn    6) ESRD  - nephrology notes appreciated  - some trouble in HD few days ago due to hypotension  - conversation held with Dr. Zamora and family, seem to agree that stopping HD would be most comfortable way to allow pt to die peacefully  - family confirmed decision to stop dialysis    7) NSCLC  - supposed to initiate XRT but admitted and in EMMANUEL  - unclear plan for this  - suggestion of progression on imaging since dx 5/2019  - unlikely to be able to handle tx for this and pt previously giving family impression that he was not interested (see goc note for details on this)    8) Debility  - PPS<40%  - nutrition notes appreciated- severe protein calorie malnutrition noted  - PT notes appreciated- EMMANUEL recommended  - Greg EMMANUEL notes appreciated- needed assistance for ADLs and many activities did not occur    9) Prognosis  - appears poor  - in light of multiple significant comorbidities including ESRD on HD, severe vasculopathy (already s/p RAKA), myelosuppression and progressive newly dx NSCLC, significant debility with multiple hospitalizations, albumin 1.6, severe protein calorie malnutrition, PPS<40%. Now that dialysis is stopped, fits criteria for hospice    10) GoC/Advanced Directives  - pt does not appear to have capacity for decision making at this time due to likely delirium, which may wax and wane  - HCP on file naming daughters: 1) Lore Esteves [phone]; and 2) Anamika Darinel [phone]  - [facility] completed 9/9/19- DNR and DNI  - GOC meeting held 9/9 and on-going since- family now on board with stopping dialysis and full comfort measures. Now considering hospice transition, will discuss amongst themselves and floor SW will follow up. Pt imminently dying    Thank you for including us in Mr. Yost's care. Will continue to follow with you.    Ronald Franco MD  Palliative Care Attending

## 2019-09-14 RX ORDER — HYDROMORPHONE HYDROCHLORIDE 2 MG/ML
0.5 INJECTION INTRAMUSCULAR; INTRAVENOUS; SUBCUTANEOUS
Refills: 0 | Status: DISCONTINUED | OUTPATIENT
Start: 2019-09-14 | End: 2019-09-16

## 2019-09-14 RX ADMIN — HYDROMORPHONE HYDROCHLORIDE 0.5 MILLIGRAM(S): 2 INJECTION INTRAMUSCULAR; INTRAVENOUS; SUBCUTANEOUS at 06:35

## 2019-09-14 RX ADMIN — FENTANYL CITRATE 1 PATCH: 50 INJECTION INTRAVENOUS at 19:23

## 2019-09-14 RX ADMIN — ROBINUL 0.2 MILLIGRAM(S): 0.2 INJECTION INTRAMUSCULAR; INTRAVENOUS at 06:35

## 2019-09-14 RX ADMIN — HYDROMORPHONE HYDROCHLORIDE 0.5 MILLIGRAM(S): 2 INJECTION INTRAMUSCULAR; INTRAVENOUS; SUBCUTANEOUS at 23:00

## 2019-09-14 RX ADMIN — HYDROMORPHONE HYDROCHLORIDE 0.5 MILLIGRAM(S): 2 INJECTION INTRAMUSCULAR; INTRAVENOUS; SUBCUTANEOUS at 22:25

## 2019-09-14 RX ADMIN — LIDOCAINE 1 PATCH: 4 CREAM TOPICAL at 02:00

## 2019-09-14 RX ADMIN — LIDOCAINE 1 PATCH: 4 CREAM TOPICAL at 19:24

## 2019-09-14 RX ADMIN — LIDOCAINE 1 PATCH: 4 CREAM TOPICAL at 12:56

## 2019-09-14 RX ADMIN — HYDROMORPHONE HYDROCHLORIDE 0.5 MILLIGRAM(S): 2 INJECTION INTRAMUSCULAR; INTRAVENOUS; SUBCUTANEOUS at 12:58

## 2019-09-14 RX ADMIN — ROBINUL 0.2 MILLIGRAM(S): 0.2 INJECTION INTRAMUSCULAR; INTRAVENOUS at 18:24

## 2019-09-14 RX ADMIN — ROBINUL 0.2 MILLIGRAM(S): 0.2 INJECTION INTRAMUSCULAR; INTRAVENOUS at 12:57

## 2019-09-14 RX ADMIN — HYDROMORPHONE HYDROCHLORIDE 0.5 MILLIGRAM(S): 2 INJECTION INTRAMUSCULAR; INTRAVENOUS; SUBCUTANEOUS at 09:23

## 2019-09-14 NOTE — PROGRESS NOTE ADULT - ASSESSMENT
Assessment/Plan  84 y.o. Male with PMHx of ESRD on HD (MWF), HTN, RA on MTX and steroids, R AKA, Hx of severe CDI and megacolon, Severe PAD s/p LLE endarterectomy complicated by left groin infections s/p Tx and muscle flap, anemia of chronic dx s/p transfusions in the past, COPD/SHAYY, diastolic CHF, GERD, Gout, HLD, recent Dx of NSCLC not treated yet and planned for XRT this month sent from SNF due to fever up to 104F and black tarry stools this am as well as dry blood in mouth. Pt was lethargic day prior, c/o lower back pain. Found to have coagulopathy with INR>8, guaiac +, severe neutropenia, RUL/RLL infiltrates, increased in size RML mass. In ED pt was febrile, tachycardic and hypotensive, but rejected by intensivist for admission to MICU.  At the time of evaluation pt is more alert, shivering, c/o lower back pain with slightly improved BP.    #Sepsis due to right sided PNA likely with gram negative organisms in the settings of immunosuppression on methotrexate, steroids and progressive NSCLC of RML  #Gram-negative bacteremia  comfort measures,  hospice care, family does not want patient to moved to the hospice  dc abx c/w fluconazole while he is eating/ severe thrush  pain management  fentanyl patch and dilaudid prn q2h for severe pain    #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  s/p methotrexate for inflammatory Arthritis   hospice eval  prn analgesia    #ESRD   HD was stopped      Dispo - comfort measures   Palliative team input appreciated   patient actively dying, family does not want to move the patient to the hospice , will revisit daily

## 2019-09-15 RX ADMIN — FENTANYL CITRATE 1 PATCH: 50 INJECTION INTRAVENOUS at 19:45

## 2019-09-15 RX ADMIN — FLUCONAZOLE 50 MILLIGRAM(S): 150 TABLET ORAL at 19:50

## 2019-09-15 RX ADMIN — HYDROMORPHONE HYDROCHLORIDE 0.5 MILLIGRAM(S): 2 INJECTION INTRAMUSCULAR; INTRAVENOUS; SUBCUTANEOUS at 03:05

## 2019-09-15 RX ADMIN — HYDROMORPHONE HYDROCHLORIDE 0.5 MILLIGRAM(S): 2 INJECTION INTRAMUSCULAR; INTRAVENOUS; SUBCUTANEOUS at 06:42

## 2019-09-15 RX ADMIN — LIDOCAINE 1 PATCH: 4 CREAM TOPICAL at 12:19

## 2019-09-15 RX ADMIN — LIDOCAINE 1 PATCH: 4 CREAM TOPICAL at 19:45

## 2019-09-15 RX ADMIN — HYDROMORPHONE HYDROCHLORIDE 0.5 MILLIGRAM(S): 2 INJECTION INTRAMUSCULAR; INTRAVENOUS; SUBCUTANEOUS at 19:50

## 2019-09-15 RX ADMIN — LIDOCAINE 1 PATCH: 4 CREAM TOPICAL at 00:51

## 2019-09-15 RX ADMIN — FENTANYL CITRATE 1 PATCH: 50 INJECTION INTRAVENOUS at 07:43

## 2019-09-15 NOTE — PROGRESS NOTE ADULT - ASSESSMENT
Assessment/Plan  84 y.o. Male with PMHx of ESRD on HD (MWF), HTN, RA on MTX and steroids, R AKA, Hx of severe CDI and megacolon, Severe PAD s/p LLE endarterectomy complicated by left groin infections s/p Tx and muscle flap, anemia of chronic dx s/p transfusions in the past, COPD/SHAYY, diastolic CHF, GERD, Gout, HLD, recent Dx of NSCLC not treated yet and planned for XRT this month sent from SN[facility] due to fever up to 104F and black tarry stools this am as well as dry blood in mouth. Pt was lethargic day prior, c/o lower back pain. Found to have coagulopathy with INR>8, guaiac +, severe neutropenia, RUL/RLL infiltrates, increased in size RML mass. In ED pt was febrile, tachycardic and hypotensive, but rejected by intensivist for admission to MICU.  At the time of evaluation pt is more alert, shivering, c/o lower back pain with slightly improved BP.    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

## 2019-09-16 ENCOUNTER — TRANSCRIPTION ENCOUNTER (OUTPATIENT)
Age: 84
End: 2019-09-16

## 2019-09-16 VITALS — DIASTOLIC BLOOD PRESSURE: 37 MMHG | SYSTOLIC BLOOD PRESSURE: 84 MMHG | HEART RATE: 99 BPM

## 2019-09-16 RX ORDER — HYDROMORPHONE HYDROCHLORIDE 2 MG/ML
0.5 INJECTION INTRAMUSCULAR; INTRAVENOUS; SUBCUTANEOUS
Qty: 0 | Refills: 0 | DISCHARGE
Start: 2019-09-16

## 2019-09-16 RX ORDER — FENTANYL CITRATE 50 UG/ML
1 INJECTION INTRAVENOUS
Qty: 0 | Refills: 0 | DISCHARGE
Start: 2019-09-16

## 2019-09-16 RX ORDER — NITROGLYCERIN 6.5 MG
0.4 CAPSULE, EXTENDED RELEASE ORAL
Qty: 0 | Refills: 0 | DISCHARGE

## 2019-09-16 RX ORDER — LIDOCAINE AND PRILOCAINE CREAM 25; 25 MG/G; MG/G
1 CREAM TOPICAL
Qty: 0 | Refills: 0 | DISCHARGE

## 2019-09-16 RX ORDER — METHOTREXATE 2.5 MG/1
1 TABLET ORAL
Qty: 0 | Refills: 0 | DISCHARGE

## 2019-09-16 RX ORDER — ROBINUL 0.2 MG/ML
0.2 INJECTION INTRAMUSCULAR; INTRAVENOUS
Qty: 0 | Refills: 0 | DISCHARGE
Start: 2019-09-16

## 2019-09-16 RX ORDER — ATORVASTATIN CALCIUM 80 MG/1
1 TABLET, FILM COATED ORAL
Qty: 0 | Refills: 0 | DISCHARGE

## 2019-09-16 RX ORDER — DILTIAZEM HCL 120 MG
1 CAPSULE, EXT RELEASE 24 HR ORAL
Qty: 0 | Refills: 0 | DISCHARGE

## 2019-09-16 RX ORDER — COLLAGENASE CLOSTRIDIUM HIST. 250 UNIT/G
1 OINTMENT (GRAM) TOPICAL
Qty: 0 | Refills: 0 | DISCHARGE

## 2019-09-16 RX ORDER — CHOLESTYRAMINE 4 G/9G
1 POWDER, FOR SUSPENSION ORAL
Qty: 0 | Refills: 0 | DISCHARGE

## 2019-09-16 RX ORDER — HYDROMORPHONE HYDROCHLORIDE 2 MG/ML
0.8 INJECTION INTRAMUSCULAR; INTRAVENOUS; SUBCUTANEOUS ONCE
Refills: 0 | Status: DISCONTINUED | OUTPATIENT
Start: 2019-09-16 | End: 2019-09-16

## 2019-09-16 RX ORDER — LORATADINE 10 MG/1
1 TABLET ORAL
Qty: 0 | Refills: 0 | DISCHARGE

## 2019-09-16 RX ORDER — LIDOCAINE 4 G/100G
1 CREAM TOPICAL
Qty: 0 | Refills: 0 | DISCHARGE
Start: 2019-09-16

## 2019-09-16 RX ORDER — WARFARIN SODIUM 2.5 MG/1
1 TABLET ORAL
Qty: 0 | Refills: 0 | DISCHARGE

## 2019-09-16 RX ADMIN — FENTANYL CITRATE 1 PATCH: 50 INJECTION INTRAVENOUS at 14:00

## 2019-09-16 RX ADMIN — LIDOCAINE 1 PATCH: 4 CREAM TOPICAL at 15:21

## 2019-09-16 RX ADMIN — HYDROMORPHONE HYDROCHLORIDE 0.5 MILLIGRAM(S): 2 INJECTION INTRAMUSCULAR; INTRAVENOUS; SUBCUTANEOUS at 06:43

## 2019-09-16 RX ADMIN — LIDOCAINE 1 PATCH: 4 CREAM TOPICAL at 00:58

## 2019-09-16 RX ADMIN — HYDROMORPHONE HYDROCHLORIDE 0.8 MILLIGRAM(S): 2 INJECTION INTRAMUSCULAR; INTRAVENOUS; SUBCUTANEOUS at 16:12

## 2019-09-16 RX ADMIN — HYDROMORPHONE HYDROCHLORIDE 0.5 MILLIGRAM(S): 2 INJECTION INTRAMUSCULAR; INTRAVENOUS; SUBCUTANEOUS at 10:03

## 2019-09-16 RX ADMIN — HYDROMORPHONE HYDROCHLORIDE 0.5 MILLIGRAM(S): 2 INJECTION INTRAMUSCULAR; INTRAVENOUS; SUBCUTANEOUS at 14:45

## 2019-09-16 RX ADMIN — FENTANYL CITRATE 1 PATCH: 50 INJECTION INTRAVENOUS at 15:20

## 2019-09-16 RX ADMIN — HYDROMORPHONE HYDROCHLORIDE 0.5 MILLIGRAM(S): 2 INJECTION INTRAMUSCULAR; INTRAVENOUS; SUBCUTANEOUS at 15:00

## 2019-09-16 RX ADMIN — HYDROMORPHONE HYDROCHLORIDE 0.5 MILLIGRAM(S): 2 INJECTION INTRAMUSCULAR; INTRAVENOUS; SUBCUTANEOUS at 10:18

## 2019-09-16 RX ADMIN — Medication 0.25 MILLIGRAM(S): at 16:12

## 2019-09-16 RX ADMIN — FENTANYL CITRATE 1 PATCH: 50 INJECTION INTRAVENOUS at 13:28

## 2019-09-16 NOTE — DISCHARGE NOTE PROVIDER - HOSPITAL COURSE
84 y.o. Male with PMHx of ESRD on HD (MWF), HTN, RA on MTX and steroids, R AKA, Hx of severe CDI and megacolon, Severe PAD s/p LLE endarterectomy complicated by left groin infections s/p Tx and muscle flap, recent Dx of NSCLC not treated yet and planned for XRT this month sent from SNF due to fever up to 104F and black tarry stools this am as well as dry blood in mouth. Pt was lethargic day prior, c/o lower back pain. Found to have coagulopathy with INR>8, guaiac +, severe neutropenia, RUL/RLL infiltrates, increased in size RML mass. In ED pt was febrile, tachycardic and hypotensive, adm for sepsis        9/11 - pt seen and examined, family at bedside, pt comfortable, encephalopathic, poor historian, all questions answered     9/12 - pt seen and examined, lethargic , no po intake, family at bedside, emotional support  provided, concern about right upper tooth being loose     9/13 - pt seen and examined, lethargic, open the eyes, was verbal in am with family , comfortable    9/14 - pt seen and examined, pain uncontrolled with q3 h timing of pain medications, family at bedside, pt minimally verbal , lethargic     9/15- pt seen and examined, comfortable, reports no dyspnea, no pain, tolerates minimal po intake, family at bedside     9/16 - pt seen and examined, denies pain, denies dyspnea, family at bedside, POC discussed in length , plan for hospice inpatient evaluation    T(C): --    HEAD:  Atraumatic, Normocephalic    EYES: EOMI, PERRLA, conjunctiva and sclera clear    HEENT: dry mucous membranes    NECK: Supple, No JVD    NERVOUS SYSTEM:  Alert & Oriented X1,  gen weakness, lethargic     CHEST/LUNG: occasional rhonchi, shalow breathing     HEART: Irregular rate and rhythm; No murmurs, rubs, or gallops    ABDOMEN: Soft, Nontender, Nondistended; Bowel sounds present    EXTREMITIES:  RT AKA    MUSCULOSKELETAL No muscle tenderness, Muscle tone normal, No joint tenderness, no Joint swelling, Joint range of motion-normal    SKIN- multiple skin hematomas; decub.ulcer    #Sepsis due to right sided PNA likely with gram negative organisms in the settings of immunosuppression on methotrexate, steroids and progressive NSCLC of RML    #E coli bacteremia    # NSCLC of RML     comfort measures,  hospice care, family does not want patient to moved to the hospice    dc abx c/w fluconazole while he is eating/ severe thrush    pain management    fentanyl patch and dilaudid prn q2h for severe pain         #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    s/p methotrexate for inflammatory Arthritis     hospice eval    prn analgesia        #ESRD     HD was stopped    # A fib on coumadin    # Coagulopathy with INR 8    # GI bleeding     # Anemia of acute blood loss     - s/p vit K     # Severe PAD s/p  R AKA     # COPD, SHAYY     # Diastolic CHF     Advanced care directives    - MOLST form    - DNR/DNI, comfort measures         Dispo - comfort measures , to inpatient hospice     Palliative team input appreciated     patient actively dying, family does not want to move the patient to the hospice     family agreed to move pt to hospice house at [facility] if stable        Discharge plan discussed with patient, RN    Patient advised to follow up with PCP within 3-7 days    time spend 40 min

## 2019-09-16 NOTE — PROGRESS NOTE ADULT - REASON FOR ADMISSION
fever, tarry stools
fever,
fever, tarry stools
GIB, hypotension, possible RLL PNA

## 2019-09-16 NOTE — PROGRESS NOTE ADULT - ASSESSMENT
84y old Male coming from [facility] with hx of ESRD on HD (MWF), HTN, RA on MTX and steroids, R AKA, dCHF, severe CDI and megacolon, Severe PAD s/p LLE endarterectomy complicated by left groin infections s/p Tx and muscle flap, recent Dx of NSCLC not treated yet and planned for XRT this month, 5th hospitalization in a year, sent from [facility]. Admitted 8/1 due to fever up to 104F and black tarry stools this am as well as dry blood in mouth. Found to have hypotension, fever, and tachycardia on admission, attributed to sepsis from finding of RUL/RLL pna on CTCAP. Also course c/b finding of pancytopenia with coagulopathy with INR>8, guaiac +, (s/p vit k, kcentra, PRBCs, neupogen, and platelets, likely due to myelosuppression from MTX. Imaging also showed increased in size RML mass and concern for rectal mass, deemed high risk for procedure. Palliative Care consulted to assist with establishing GOC.     1) Pain  - lower back pain, and sacrum (developing decubitus) hx of sig RA on MTX  - agree with continuing with low dose fentanyl patch as this is the wish of the family and safest opioid in setting of ESRD  - c/w IV dilaudid 0.2 mg q3h IV prn mod (lower dose to hopefully provide relief with less sedation)   - maintain 0.5mg q3h IV prn severe sx    2) AMS  - likely due to delirium  - will wax and wane  - fall and aspiration precautions  - frequent reorientation  - c/w IV ativan 0.25mg q4h prn    3) cough/secretions  - c/w IV glyco 0.2 q6h atc    4) Pancytopenia  - heme notes appreciated  - most likely due to myelosuppression from MTX  - s/p vitK, s/p Kcentra, s/p PRBCs, s/p neupogen  - platelets still quite low    5) GIB  - GI notes appreciated  - high risk for endoscopy  - supportive care in the interim  - H/H stable prior to initiation of comfort measures, labs no longer being drawn    6) ESRD  - nephrology notes appreciated  - some trouble in HD few days ago due to hypotension  - conversation held with Dr. Sera and family, seem to agree that stopping HD would be most comfortable way to allow pt to die peacefully  - family confirmed decision to stop dialysis    7) NSCLC  - supposed to initiate XRT but admitted and in [facility]  - unclear plan for this  - suggestion of progression on imaging since dx 5/2019  - unlikely to be able to handle tx for this and pt previously giving family impression that he was not interested (see goc note for details on this)    8) Debility  - PPS<40%  - nutrition notes appreciated- severe protein calorie malnutrition noted  - PT notes appreciated- EMM[facility]UEL recommended  - Greg [facility] notes appreciated- needed assistance for ADLs and many activities did not occur    9) Prognosis  - appears poor  - in light of multiple significant comorbidities including ESRD on HD, severe vasculopathy (already s/p RAKA), myelosuppression and progressive newly dx NSCLC, significant debility with multiple hospitalizations, albumin 1.6, severe protein calorie malnutrition, PPS<40%. Now that dialysis is stopped, fits criteria for hospice    10) GoC/Advanced Directives  - pt does not appear to have capacity for decision making at this time due to likely delirium, which may wax and wane  - HCP on file naming daughters: 1) Lore Esteves [phone]; and 2) Anamika Yost-Maged [phone]  - MOLST completed 9/9/19- DNR and DNI  - GOC meeting held 9/9 and on-going since- family now on board with stopping dialysis and full comfort measures. Now also open to [facility] hospice transition, plan to pursue referral and if bed available, to recheck vitals for stability to go. Floor SW will follow up.     Thank you for including us in Mr. Yost's care. Will continue to follow with you.    Ronald Franco MD  Palliative Care Attending

## 2019-09-16 NOTE — DISCHARGE NOTE PROVIDER - NSDCCPCAREPLAN_GEN_ALL_CORE_FT
PRINCIPAL DISCHARGE DIAGNOSIS  Diagnosis: Sepsis  Assessment and Plan of Treatment: comfort measures only      SECONDARY DISCHARGE DIAGNOSES  Diagnosis: End-stage renal disease (ESRD)  Assessment and Plan of Treatment: HD discontinued

## 2019-09-16 NOTE — PROGRESS NOTE ADULT - SUBJECTIVE AND OBJECTIVE BOX
84 y.o. Male with PMHx of ESRD on HD (MWF), HTN, RA on MTX and steroids, R AKA, Hx of severe CDI and megacolon, Severe PAD s/p LLE endarterectomy complicated by left groin infections s/p Tx and muscle flap, recent Dx of NSCLC not treated yet and planned for XRT this month sent from SNF due to fever up to 104F and black tarry stools this am as well as dry blood in mouth. Pt was lethargic day prior, c/o lower back pain. Found to have coagulopathy with INR>8, guaiac +, severe neutropenia, RUL/RLL infiltrates, increased in size RML mass. In ED pt was febrile, tachycardic and hypotensive, adm for sepsis    Pt c/o pain site of the bed sores; pain better ctr with small dose dilaudid. Daughter present      Vital Signs Last 24 Hrs  T(C): 36.7 (07 Sep 2019 09:57), Max: 38.2 (06 Sep 2019 16:40)  T(F): 98 (07 Sep 2019 09:57), Max: 100.8 (06 Sep 2019 16:40)  HR: 89 (07 Sep 2019 11:00) (72 - 108)  BP: 126/61 (07 Sep 2019 11:00) (95/54 - 139/65)  BP(mean): 78 (07 Sep 2019 11:00) (57 - 89)  RR: 20 (07 Sep 2019 11:00) (13 - 26)  SpO2: 98% (07 Sep 2019 11:00) (89% - 100%)    PHYSICAL EXAM:    HEAD:  Atraumatic, Normocephalic  EYES: EOMI, PERRLA, conjunctiva and sclera clear  HEENT: Moist mucous membranes  NECK: Supple, No JVD  NERVOUS SYSTEM:  Alert & Oriented X3,  gen weakness  CHEST/LUNG: occasional rhonchi  HEART: Irregular rate and rhythm; No murmurs, rubs, or gallops  ABDOMEN: Soft, Nontender, Nondistended; Bowel sounds present  GENITOURINARY- no palpable bladder  EXTREMITIES:  RT AKA  MUSCULOSKELETAL No muscle tenderness, Muscle tone normal, No joint tenderness, no Joint swelling, Joint range of motion-normal  SKIN- multiple skin hematomas; decub.ulcer  CNS- alert, oriented X3, gen weakness                            9.2    0.40  )-----------( 37       ( 07 Sep 2019 06:53 )             29.0   09-07    141  |  101  |  38<H>  ----------------------------<  126<H>  4.2   |  28  |  2.28<H>    Ca    8.4<L>      07 Sep 2019 06:53  Phos  1.7     09-06    TPro  x   /  Alb  1.6<L>  /  TBili  x   /  DBili  x   /  AST  x   /  ALT  x   /  AlkPhos  x   09-06          VT    Differential diagnosis for the appearance of the focal thickening of the   right lateral wall of the rectum is focal proctitis or tumor. Again   direct visualization is recommended when clinically feasible.  ABDOMINAL WALL: There are postsurgical changes in both inguinal regions   again noted, left greater than right.  BONES: There is a mild to moderate anterior wedging deformity of the T12   vertebral body.  IMPRESSION: Increase in size of the right middle lobe lung mass.
INTERVAL HISTORY:  Patient had difficult time with HD earlier today.   patient more lethargic this evening  discussed with daughter and son  REVIEW OF SYSTEMS:      Allergies    Zosyn (Rash)    Intolerances        MEDICATIONS  (STANDING):  acetaminophen   Tablet .. 650 milliGRAM(s) Oral every 12 hours  cefepime  Injectable. 1000 milliGRAM(s) IV Push daily  diltiazem    Tablet 60 milliGRAM(s) Oral every 6 hours  epoetin nelly Injectable 45698 Unit(s) IV Push <User Schedule>  fentaNYL   Patch  12 MICROgram(s)/Hr 1 Patch Transdermal every 72 hours  filgrastim-sndz Injectable 480 MICROGram(s) SubCutaneous daily  finasteride 5 milliGRAM(s) Oral daily  fluconAZOLE IVPB      fluconAZOLE IVPB 100 milliGRAM(s) IV Intermittent every 24 hours  fluticasone propionate 50 MICROgram(s)/spray Nasal Spray 1 Spray(s) Both Nostrils daily  lidocaine   Patch 1 Patch Transdermal daily  pantoprazole  Injectable 40 milliGRAM(s) IV Push two times a day  vancomycin    Solution 125 milliGRAM(s) Oral every 6 hours    MEDICATIONS  (PRN):  acetaminophen   Tablet .. 650 milliGRAM(s) Oral every 6 hours PRN Temp greater or equal to 38C (100.4F), Mild Pain (1 - 3)  ALBUTerol    90 MICROgram(s) HFA Inhaler 2 Puff(s) Inhalation every 6 hours PRN Shortness of Breath and/or Wheezing  HYDROmorphone  Injectable 0.5 milliGRAM(s) IV Push every 4 hours PRN brekathrough pain or dyspnea      Vital Signs Last 24 Hrs  T(C): 36.7 (09 Sep 2019 21:22), Max: 37.1 (09 Sep 2019 10:10)  T(F): 98.1 (09 Sep 2019 21:22), Max: 98.8 (09 Sep 2019 10:10)  HR: 85 (09 Sep 2019 23:53) (70 - 104)  BP: 115/30 (09 Sep 2019 23:53) (98/67 - 140/31)  BP(mean): --  RR: 18 (09 Sep 2019 21:22) (17 - 18)  SpO2: 94% (09 Sep 2019 21:22) (94% - 97%)    PHYSICAL EXAM:    GENERAL: NAD, frail elderly male + thrush  HEAD:  Atraumatic, Normocephalic  EYES: EOMI, PERRLA, conjunctiva and sclera clear  NECK: Supple, No JVD,   CHEST/LUNG: no rhonchi  HEART: Regular rate and rhythm;   ABDOMEN: Soft, Nontender.  EXTREMITIES:   edema:- right AKA   LYMPH: No lymphadenopathy noted        LABS:                        9.0    1.88  )-----------( 34       ( 09 Sep 2019 10:10 )             28.8     09-09    134<L>  |  98  |  75<H>  ----------------------------<  108<H>  4.7   |  27  |  4.19<H>    Ca    8.6      09 Sep 2019 10:10  Phos  3.6     09-09    TPro  x   /  Alb  1.7<L>  /  TBili  x   /  DBili  x   /  AST  x   /  ALT  x   /  AlkPhos  x   09-09    PT/INR - ( 09 Sep 2019 10:10 )   PT: 44.7 sec;   INR: 3.86 ratio                 RADIOLOGY & ADDITIONAL STUDIES:    PATHOLOGY:
NEPHROLOGY INTERVAL HPI/OVERNIGHT EVENTS:  19 @ 12:08  + pain, percocet with some response  sacral decub  shoulder pain with lidocaine patch       feels well  seen on hd  platelets ordered by dr Masood leonard  daughter at bedside     SY  Tired.  Slept poorly last night.  Complains of back pain.  Eating a little better this am.    94 SY  No acute events overnight.  No apparent distress.  No new complaints.    MEDICATIONS  (STANDING):  acetaminophen   Tablet .. 650 milliGRAM(s) Oral every 12 hours  buDESOnide    Inhalation Suspension 0.5 milliGRAM(s) Inhalation two times a day  cefepime  Injectable. 1000 milliGRAM(s) IV Push daily  diltiazem    Tablet 60 milliGRAM(s) Oral every 6 hours  epoetin nelly Injectable 60671 Unit(s) IV Push <User Schedule>  fentaNYL   Patch  12 MICROgram(s)/Hr 1 Patch Transdermal every 72 hours  finasteride 5 milliGRAM(s) Oral daily  fluticasone propionate 50 MICROgram(s)/spray Nasal Spray 1 Spray(s) Both Nostrils daily  lidocaine   Patch 1 Patch Transdermal daily  nystatin    Suspension 106036 Unit(s) Swish and Swallow every 6 hours  pantoprazole  Injectable 40 milliGRAM(s) IV Push two times a day  potassium phosphate / sodium phosphate powder 1 Packet(s) Oral two times a day  vancomycin    Solution 125 milliGRAM(s) Oral every 6 hours    MEDICATIONS  (PRN):  acetaminophen   Tablet .. 650 milliGRAM(s) Oral every 6 hours PRN Temp greater or equal to 38C (100.4F), Mild Pain (1 - 3)  ALBUTerol    90 MICROgram(s) HFA Inhaler 2 Puff(s) Inhalation every 6 hours PRN Shortness of Breath and/or Wheezing  HYDROmorphone  Injectable 0.5 milliGRAM(s) IV Push every 3 hours PRN Moderate Pain (4 - 6)  oxyCODONE    5 mG/acetaminophen 325 mG 2 Tablet(s) Oral every 4 hours PRN Moderate Pain (4 - 6)      Allergies    Zosyn (Rash)    Intolerances        I&O's Detail    06 Sep 2019 07:01  -  07 Sep 2019 07:00  --------------------------------------------------------  IN:    Platelets - Single Donor: 197 mL  Total IN: 197 mL    OUT:  Total OUT: 0 mL    Total NET: 197 mL        Vital Signs Last 24 Hrs  T(C): 36.7 (07 Sep 2019 09:57), Max: 38.2 (06 Sep 2019 16:40)  T(F): 98 (07 Sep 2019 09:57), Max: 100.8 (06 Sep 2019 16:40)  HR: 89 (07 Sep 2019 11:00) (72 - 108)  BP: 126/61 (07 Sep 2019 11:00) (95/54 - 139/65)  BP(mean): 78 (07 Sep 2019 11:00) (57 - 89)  RR: 20 (07 Sep 2019 11:00) (13 - 26)  SpO2: 98% (07 Sep 2019 11:00) (89% - 100%)  Daily     Daily Weight in k (06 Sep 2019 13:23)    PHYSICAL EXAM:  General: alert. awake Ox3  HEENT: MMM  CV: s1s2 rrr  LUNGS: B/L CTA  EXT: no edema    LABS:                        9.2    0.40  )-----------( 37       ( 07 Sep 2019 06:53 )             29.0     09-07    141  |  101  |  38<H>  ----------------------------<  126<H>  4.2   |  28  |  2.28<H>    Ca    8.4<L>      07 Sep 2019 06:53  Phos  1.7         TPro  x   /  Alb  1.6<L>  /  TBili  x   /  DBili  x   /  AST  x   /  ALT  x   /  AlkPhos  x       PT/INR - ( 07 Sep 2019 06:53 )   PT: 24.3 sec;   INR: 2.14 ratio
Patient is a 84y old  Male who presents with a chief complaint of fever, tarry stools (11 Sep 2019 15:48)      HPI:  84 y.o. Male with PMHx of ESRD on HD (MWF), HTN, RA on MTX and steroids, R AKA, Hx of severe CDI and megacolon, Severe PAD s/p LLE endarterectomy complicated by left groin infections s/p Tx and muscle flap, recent Dx of NSCLC not treated yet and planned for XRT this month sent from SNF due to fever up to 104F and black tarry stools this am as well as dry blood in mouth. Pt was lethargic day prior, c/o lower back pain. Found to have coagulopathy with INR>8, guaiac +, severe neutropenia, RUL/RLL infiltrates, increased in size RML mass. In ED pt was febrile, tachycardic and hypotensive, but rejected by intensivist for admission to MICU.  At the time of evaluation pt is more alert, shivering, c/o lower back pain with slightly improved BP. (01 Sep 2019 15:09)      comf on analgesics and DW family      PAST MEDICAL & SURGICAL HISTORY:  Rheumatoid arthritis  Above knee amputation of right lower extremity  Recurrent Clostridium difficile diarrhea  Diastolic CHF  Peripheral vascular disease  Afib  Anemia  CKD (chronic kidney disease)  COPD (chronic obstructive pulmonary disease)  SHAYY (obstructive sleep apnea)  Sepsis, due to unspecified organism: 2/2 poorly healing wounds b/l  Dyspepsia: On moderate exertion.  Sleep apnea, obstructive: Requires home 02 therapy, and treatment with BIPAP  Atelectasis  Pleural effusion, bilateral  Respiratory failure  Peripheral edema  CRI (chronic renal insufficiency)  Gout  Benign prostatic hypertrophy  Spinal stenosis  Hypercholesterolemia  GERD (gastroesophageal reflux disease)  CAD (coronary artery disease)  Hypertension  S/P angioplasty with stent  Cataract of left eye  Prostate: Surgery green light procedure.  S/P rotator cuff surgery: Right  S/P angioplasty      MEDICATIONS  (STANDING):  acetaminophen   Tablet .. 650 milliGRAM(s) Oral every 12 hours  diltiazem    Tablet 60 milliGRAM(s) Oral every 6 hours  epoetin nelly Injectable 03292 Unit(s) IV Push <User Schedule>  fentaNYL   Patch  12 MICROgram(s)/Hr 1 Patch Transdermal every 72 hours  finasteride 5 milliGRAM(s) Oral daily  fluconAZOLE IVPB 100 milliGRAM(s) IV Intermittent every 48 hours  lidocaine   Patch 1 Patch Transdermal daily    MEDICATIONS  (PRN):  acetaminophen   Tablet .. 650 milliGRAM(s) Oral every 6 hours PRN Temp greater or equal to 38C (100.4F), Mild Pain (1 - 3)  ALBUTerol    90 MICROgram(s) HFA Inhaler 2 Puff(s) Inhalation every 6 hours PRN Shortness of Breath and/or Wheezing  HYDROmorphone  Injectable 0.2 milliGRAM(s) IV Push every 3 hours PRN moderate pain or dyspnea  HYDROmorphone  Injectable 0.5 milliGRAM(s) IV Push every 3 hours PRN severe pain or dyspnea  LORazepam   Injectable 0.25 milliGRAM(s) IV Push every 4 hours PRN anxiety or agitation      Allergies    Zosyn (Rash)    Intolerances        SOCIAL HISTORY:NC    FAMILY HISTORY:  Family history of colorectal cancer  Family history of diabetes mellitus (Sibling)  Family history of hypertension      REVIEW OF SYSTEMS:    not reliable    Vital Signs Last 24 Hrs  T(C): --  T(F): --  HR: --  BP: --  BP(mean): --  RR: --  SpO2: --    PHYSICAL EXAM:    Constitutional: NAD, well-developed  HEENT: EOMI, throat clear  Neck: No LAD, supple  Respiratory: CTA and P  Cardiovascular: S1 and S2, RRR, no M  Gastrointestinal: BS+, soft, NT/ND, neg HSM,  Extremities: No peripheral edema, neg clubing, cyanosis  Vascular: 2+ peripheral pulses  Neurological: A/O x 3, no focal deficits  Psychiatric: Normal mood, normal affect  Skin: No rashes    LABS:              09-01 @ 19:40  Hep A Igm Nonreact  Hep A total ab, IgA and M --  Hep B core Ab total --  Hep B core IgM Nonreact  Hep B DNA PCR --  Hep BSAg --  Hep BSAb --  Hep BSAb --  HCV Ab --  HCV RNA Log --  HCV RNA interp --                RADIOLOGY & ADDITIONAL STUDIES:
84 y.o. Male with PMHx of ESRD on HD (MWF), HTN, RA on MTX and steroids, R AKA, Hx of severe CDI and megacolon, Severe PAD s/p LLE endarterectomy complicated by left groin infections s/p Tx and muscle flap, recent Dx of NSCLC not treated yet and planned for XRT this month sent from SNF due to fever up to 104F and black tarry stools this am as well as dry blood in mouth. Pt was lethargic day prior, c/o lower back pain. Found to have coagulopathy with INR>8, guaiac +, severe neutropenia, RUL/RLL infiltrates, increased in size RML mass. In ED pt was febrile, tachycardic and hypotensive, adm for sepsis    In less pain today; fam met with Pall; hospice is being contemplated      Vital Signs Last 24 Hrs  T(C): 37.1 (09 Sep 2019 10:10), Max: 37.1 (09 Sep 2019 10:10)  T(F): 98.8 (09 Sep 2019 10:10), Max: 98.8 (09 Sep 2019 10:10)  HR: 83 (09 Sep 2019 10:48) (69 - 93)  BP: 112/40 (09 Sep 2019 10:48) (112/40 - 140/31)  BP(mean): --  RR: 18 (09 Sep 2019 10:48) (18 - 20)  SpO2: 97% (09 Sep 2019 05:13) (94% - 97%)    PHYSICAL EXAM:    HEAD:  Atraumatic, Normocephalic  EYES: EOMI, PERRLA, conjunctiva and sclera clear  HEENT: Moist mucous membranes  NECK: Supple, No JVD  NERVOUS SYSTEM:  Alert & Oriented X3,  gen weakness  CHEST/LUNG: occasional rhonchi  HEART: Irregular rate and rhythm; No murmurs, rubs, or gallops  ABDOMEN: Soft, Nontender, Nondistended; Bowel sounds present  GENITOURINARY- no palpable bladder  EXTREMITIES:  RT AKA  MUSCULOSKELETAL No muscle tenderness, Muscle tone normal, No joint tenderness, no Joint swelling, Joint range of motion-normal  SKIN- multiple skin hematomas; decub.ulcer  CNS- alert, oriented X3, gen weakness                                        Differential diagnosis for the appearance of the focal thickening of the   right lateral wall of the rectum is focal proctitis or tumor. Again   direct visualization is recommended when clinically feasible.  ABDOMINAL WALL: There are postsurgical changes in both inguinal regions   again noted, left greater than right.  BONES: There is a mild to moderate anterior wedging deformity of the T12   vertebral body.  IMPRESSION: Increase in size of the right middle lobe lung mass.
84 y.o. Male with PMHx of ESRD on HD (MWF), HTN, RA on MTX and steroids, R AKA, Hx of severe CDI and megacolon, Severe PAD s/p LLE endarterectomy complicated by left groin infections s/p Tx and muscle flap, recent Dx of NSCLC not treated yet and planned for XRT this month sent from SNF due to fever up to 104F and black tarry stools this am as well as dry blood in mouth. Pt was lethargic day prior, c/o lower back pain. Found to have coagulopathy with INR>8, guaiac +, severe neutropenia, RUL/RLL infiltrates, increased in size RML mass. In ED pt was febrile, tachycardic and hypotensive, adm for sepsis    In less pain today; slept until 1100; can't name daughter's name who is present      Vital Signs Last 24 Hrs  T(C): 36.7 (08 Sep 2019 11:43), Max: 37.3 (07 Sep 2019 17:57)  T(F): 98 (08 Sep 2019 11:43), Max: 99.2 (07 Sep 2019 17:57)  HR: 83 (08 Sep 2019 11:43) (82 - 92)  BP: 127/59 (08 Sep 2019 11:43) (106/40 - 132/56)  BP(mean): 73 (07 Sep 2019 16:00) (68 - 73)  RR: 20 (08 Sep 2019 11:43) (19 - 21)  SpO2: 95% (08 Sep 2019 11:43) (95% - 100%)    PHYSICAL EXAM:    HEAD:  Atraumatic, Normocephalic  EYES: EOMI, PERRLA, conjunctiva and sclera clear  HEENT: Moist mucous membranes  NECK: Supple, No JVD  NERVOUS SYSTEM:  Alert & Oriented X3,  gen weakness  CHEST/LUNG: occasional rhonchi  HEART: Irregular rate and rhythm; No murmurs, rubs, or gallops  ABDOMEN: Soft, Nontender, Nondistended; Bowel sounds present  GENITOURINARY- no palpable bladder  EXTREMITIES:  RT AKA  MUSCULOSKELETAL No muscle tenderness, Muscle tone normal, No joint tenderness, no Joint swelling, Joint range of motion-normal  SKIN- multiple skin hematomas; decub.ulcer  CNS- alert, oriented X3, gen weakness                                       9.2    0.58  )-----------( 25       ( 08 Sep 2019 06:40 )             29.1   09-08    140  |  100  |  58<H>  ----------------------------<  110<H>  4.5   |  29  |  3.19<H>    Ca    8.4<L>      08 Sep 2019 06:40              Differential diagnosis for the appearance of the focal thickening of the   right lateral wall of the rectum is focal proctitis or tumor. Again   direct visualization is recommended when clinically feasible.  ABDOMINAL WALL: There are postsurgical changes in both inguinal regions   again noted, left greater than right.  BONES: There is a mild to moderate anterior wedging deformity of the T12   vertebral body.  IMPRESSION: Increase in size of the right middle lobe lung mass.
84 y.o. Male with PMHx of ESRD on HD (MWF), HTN, RA on MTX and steroids, R AKA, Hx of severe CDI and megacolon, Severe PAD s/p LLE endarterectomy complicated by left groin infections s/p Tx and muscle flap, recent Dx of NSCLC not treated yet and planned for XRT this month sent from SNF due to fever up to 104F and black tarry stools this am as well as dry blood in mouth. Pt was lethargic day prior, c/o lower back pain. Found to have coagulopathy with INR>8, guaiac +, severe neutropenia, RUL/RLL infiltrates, increased in size RML mass. In ED pt was febrile, tachycardic and hypotensive, adm for sepsis    Pt will be ref to hospice in the nxt 24 if stable        HEAD:  Atraumatic, Normocephalic  EYES: EOMI, PERRLA, conjunctiva and sclera clear  HEENT: dry mucous membranes  NECK: Supple, No JVD  NERVOUS SYSTEM:  Alert & Oriented X3,  gen weakness  CHEST/LUNG: occasional rhonchi  HEART: Irregular rate and rhythm; No murmurs, rubs, or gallops  ABDOMEN: Soft, Nontender, Nondistended; Bowel sounds present  EXTREMITIES:  RT AKA  MUSCULOSKELETAL No muscle tenderness, Muscle tone normal, No joint tenderness, no Joint swelling, Joint range of motion-normal  SKIN- multiple skin hematomas; decub.ulcer
CC - lethargic, pain        84 y.o. Male with PMHx of ESRD on HD (MWF), HTN, RA on MTX and steroids, R AKA, Hx of severe CDI and megacolon, Severe PAD s/p LLE endarterectomy complicated by left groin infections s/p Tx and muscle flap, recent Dx of NSCLC not treated yet and planned for XRT this month sent from SNF due to fever up to 104F and black tarry stools this am as well as dry blood in mouth. Pt was lethargic day prior, c/o lower back pain. Found to have coagulopathy with INR>8, guaiac +, severe neutropenia, RUL/RLL infiltrates, increased in size RML mass. In ED pt was febrile, tachycardic and hypotensive, adm for sepsis    9/11 - pt seen and examined, family at bedside, pt comfortable, encephalopathic, poor historian, all questions answered   9/12 - pt seen and examined, lethargic , no po intake, family at bedside, emotional support  provided, concern about right upper tooth being loose   9/13 - pt seen and examined, lethargic, open the eyes, was verbal in am with family , comfortable  9/14 - pt seen and examined, pain uncontrolled with q3 h timing of pain medications, family at bedside, pt minimally verbal , lethargic   9/15- pt seen and examined, comfortable, reports no dyspnea, no pain, tolerates minimal po intake, family at bedside   T(C): --  T(F): --  HR: --  BP: 139/20 (09-10-19 @ 18:01) (139/20 - 139/20)  RR: --  SpO2: --  Wt(kg): --  HEAD:  Atraumatic, Normocephalic  EYES: EOMI, PERRLA, conjunctiva and sclera clear  HEENT: dry mucous membranes  NECK: Supple, No JVD  NERVOUS SYSTEM:  Alert & Oriented X1,  gen weakness, lethargic   CHEST/LUNG: occasional rhonchi, shalow breathing   HEART: Irregular rate and rhythm; No murmurs, rubs, or gallops  ABDOMEN: Soft, Nontender, Nondistended; Bowel sounds present  EXTREMITIES:  RT AKA  MUSCULOSKELETAL No muscle tenderness, Muscle tone normal, No joint tenderness, no Joint swelling, Joint range of motion-normal  SKIN- multiple skin hematomas; decub.ulcer  Neuro - lethargic, open the eyes, minimally verbal
CC - lethargic, pain        84 y.o. Male with PMHx of ESRD on HD (MWF), HTN, RA on MTX and steroids, R AKA, Hx of severe CDI and megacolon, Severe PAD s/p LLE endarterectomy complicated by left groin infections s/p Tx and muscle flap, recent Dx of NSCLC not treated yet and planned for XRT this month sent from SNF due to fever up to 104F and black tarry stools this am as well as dry blood in mouth. Pt was lethargic day prior, c/o lower back pain. Found to have coagulopathy with INR>8, guaiac +, severe neutropenia, RUL/RLL infiltrates, increased in size RML mass. In ED pt was febrile, tachycardic and hypotensive, adm for sepsis    9/11 - pt seen and examined, family at bedside, pt comfortable, encephalopathic, poor historian, all questions answered   9/12 - pt seen and examined, lethargic , no po intake, family at bedside, emotional support  provided, concern about right upper tooth being loose   9/13 - pt seen and examined, lethargic, open the eyes, was verbal in am with family , comfortable  9/14 - pt seen and examined, pain uncontrolled with q3 h timing of pain medications, family at bedside, pt minimally verbal , lethargic   ROS - unable to obtain due to mental status   T(C): --  T(F): --  HR: --  BP: 139/20 (09-10-19 @ 18:01) (139/20 - 139/20)  RR: --  SpO2: --  Wt(kg): --  HEAD:  Atraumatic, Normocephalic  EYES: EOMI, PERRLA, conjunctiva and sclera clear  HEENT: dry mucous membranes  NECK: Supple, No JVD  NERVOUS SYSTEM:  Alert & Oriented X1,  gen weakness, lethargic   CHEST/LUNG: occasional rhonchi, shalow breathing   HEART: Irregular rate and rhythm; No murmurs, rubs, or gallops  ABDOMEN: Soft, Nontender, Nondistended; Bowel sounds present  EXTREMITIES:  RT AKA  MUSCULOSKELETAL No muscle tenderness, Muscle tone normal, No joint tenderness, no Joint swelling, Joint range of motion-normal  SKIN- multiple skin hematomas; decub.ulcer  Neuro - lethargic, open the eyes, minimally verbal
CC - lethargic, pain        84 y.o. Male with PMHx of ESRD on HD (MWF), HTN, RA on MTX and steroids, R AKA, Hx of severe CDI and megacolon, Severe PAD s/p LLE endarterectomy complicated by left groin infections s/p Tx and muscle flap, recent Dx of NSCLC not treated yet and planned for XRT this month sent from SNF due to fever up to 104F and black tarry stools this am as well as dry blood in mouth. Pt was lethargic day prior, c/o lower back pain. Found to have coagulopathy with INR>8, guaiac +, severe neutropenia, RUL/RLL infiltrates, increased in size RML mass. In ED pt was febrile, tachycardic and hypotensive, adm for sepsis    9/11 - pt seen and examined, family at bedside, pt comfortable, encephalopathic, poor historian, all questions answered   9/12 - pt seen and examined, lethargic , no po intake, family at bedside, emotional support  provided, concern about right upper tooth being loose   9/13 - pt seen and examined, lethargic, open the eyes, was verbal in am with family , comfortable  ROS - unable to obtain due to mental status   T(C): --  T(F): --  HR: --  BP: 139/20 (09-10-19 @ 18:01) (139/20 - 139/20)  RR: --  SpO2: --  Wt(kg): --  HEAD:  Atraumatic, Normocephalic  EYES: EOMI, PERRLA, conjunctiva and sclera clear  HEENT: dry mucous membranes  NECK: Supple, No JVD  NERVOUS SYSTEM:  Alert & Oriented X1,  gen weakness, lethargic   CHEST/LUNG: occasional rhonchi, shalow breathing   HEART: Irregular rate and rhythm; No murmurs, rubs, or gallops  ABDOMEN: Soft, Nontender, Nondistended; Bowel sounds present  EXTREMITIES:  RT AKA  MUSCULOSKELETAL No muscle tenderness, Muscle tone normal, No joint tenderness, no Joint swelling, Joint range of motion-normal  SKIN- multiple skin hematomas; decub.ulcer  Neuro - lethargic, open the eyes, minimally verbal
CC - lethargic, pain        84 y.o. Male with PMHx of ESRD on HD (MWF), HTN, RA on MTX and steroids, R AKA, Hx of severe CDI and megacolon, Severe PAD s/p LLE endarterectomy complicated by left groin infections s/p Tx and muscle flap, recent Dx of NSCLC not treated yet and planned for XRT this month sent from SNF due to fever up to 104F and black tarry stools this am as well as dry blood in mouth. Pt was lethargic day prior, c/o lower back pain. Found to have coagulopathy with INR>8, guaiac +, severe neutropenia, RUL/RLL infiltrates, increased in size RML mass. In ED pt was febrile, tachycardic and hypotensive, adm for sepsis    9/11 - pt seen and examined, family at bedside, pt comfortable, encephalopathic, poor historian, all questions answered   9/12 - pt seen and examined, lethargic , no po intake, family at bedside, emotional support  provided, concern about right upper tooth being loose   ROS - unable to obtain due to mental status   T(C): --  T(F): --  HR: --  BP: 139/20 (09-10-19 @ 18:01) (139/20 - 139/20)  RR: --  SpO2: --  Wt(kg): --  HEAD:  Atraumatic, Normocephalic  EYES: EOMI, PERRLA, conjunctiva and sclera clear  HEENT: dry mucous membranes  NECK: Supple, No JVD  NERVOUS SYSTEM:  Alert & Oriented X1,  gen weakness, lethargic   CHEST/LUNG: occasional rhonchi, shalow breathing   HEART: Irregular rate and rhythm; No murmurs, rubs, or gallops  ABDOMEN: Soft, Nontender, Nondistended; Bowel sounds present  EXTREMITIES:  RT AKA  MUSCULOSKELETAL No muscle tenderness, Muscle tone normal, No joint tenderness, no Joint swelling, Joint range of motion-normal  SKIN- multiple skin hematomas; decub.ulcer  Neuro - lethargic, open the eyes, minimally verbal
CC - lethargic, pain        84 y.o. Male with PMHx of ESRD on HD (MWF), HTN, RA on MTX and steroids, R AKA, Hx of severe CDI and megacolon, Severe PAD s/p LLE endarterectomy complicated by left groin infections s/p Tx and muscle flap, recent Dx of NSCLC not treated yet and planned for XRT this month sent from SNF due to fever up to 104F and black tarry stools this am as well as dry blood in mouth. Pt was lethargic day prior, c/o lower back pain. Found to have coagulopathy with INR>8, guaiac +, severe neutropenia, RUL/RLL infiltrates, increased in size RML mass. In ED pt was febrile, tachycardic and hypotensive, adm for sepsis    9/11 - pt seen and examined, family at bedside, pt comfortable, encephalopathic, poor historian, all questions answered   ROS - unable to obtain due to mental status   T(C): --  T(F): --  HR: --  BP: 139/20 (09-10-19 @ 18:01) (139/20 - 139/20)  RR: --  SpO2: --  Wt(kg): --  HEAD:  Atraumatic, Normocephalic  EYES: EOMI, PERRLA, conjunctiva and sclera clear  HEENT: dry mucous membranes  NECK: Supple, No JVD  NERVOUS SYSTEM:  Alert & Oriented X3,  gen weakness  CHEST/LUNG: occasional rhonchi  HEART: Irregular rate and rhythm; No murmurs, rubs, or gallops  ABDOMEN: Soft, Nontender, Nondistended; Bowel sounds present  EXTREMITIES:  RT AKA  MUSCULOSKELETAL No muscle tenderness, Muscle tone normal, No joint tenderness, no Joint swelling, Joint range of motion-normal  SKIN- multiple skin hematomas; decub.ulcer  Neuro - lethargic, open the eyes, minimally verbal
COVERING FOR DR Zamora et al     84 y.o. Male with PMHx of ESRD on HD (MWF) at [facility] , HTN, RA on MTX and steroids, R AKA, Hx of severe CDI and megacolon, Severe PAD s/p LLE endarterectomy complicated by left groin infections s/p Tx and muscle flap, recent Dx of NSCLC not treated yet and planned for XRT this month sent from SNF due to fever up to 104F and black tarry stools this am as well as dry blood/clots in mouth noted by daughter.   Pt was lethargic day prior, c/o lower back pain. Found to have coagulopathy with INR>8, guaiac +, severe neutropenia, RUL/RLL infiltrates, increased in size RML mass. In ED pt was febrile, tachycardic and hypotensive SBP 70s' and required IVF bolus.  Repeat Hb < 7 now - will need 2 Units PRBC    At the time of evaluation pt is more alert, shivering, c/o lower back pain with slightly improved BP   daughter at bedside     9/2   s/p acute HD last night for hyperkalemia and additional K load with PRBC          today    seen on HD    feeling ok   sob - minimal   no cough  +weak    BCX + GNR    uf goal 2 Liters as BP allows    PRBC 1 unit w HD    no tarry stools overnight     9/3  chart reviewed  feels well, upset about being hospitalized again  h/h stable    PAST MEDICAL & SURGICAL HISTORY:  Rheumatoid arthritis  Above knee amputation of right lower extremity  Recurrent Clostridium difficile diarrhea  Diastolic CHF  Peripheral vascular disease  Afib  Anemia  CKD (chronic kidney disease)  COPD (chronic obstructive pulmonary disease)  SHAYY (obstructive sleep apnea)  Sepsis, due to unspecified organism: 2/2 poorly healing wounds b/l  Dyspepsia: On moderate exertion.  Sleep apnea, obstructive: Requires home 02 therapy, and treatment with BIPAP  Atelectasis  Pleural effusion, bilateral  Respiratory failure  Peripheral edema  CRI (chronic renal insufficiency)  Gout  Benign prostatic hypertrophy  Spinal stenosis  Hypercholesterolemia  GERD (gastroesophageal reflux disease)  CAD (coronary artery disease)  Hypertension  S/P angioplasty with stent  Cataract of left eye  Prostate: Surgery green light procedure.  S/P rotator cuff surgery: Right  S/P angioplasty    Home Medications:  acetaminophen 325 mg oral tablet: 2 tab(s) orally every 6 hours, As needed, Temp greater or equal to 38C (100.4F), Mild Pain (1 - 3) (19 Apr 2019 12:26)  albuterol 90 mcg/inh inhalation aerosol: 2 puff(s) inhaled every 6 hours, As needed, Shortness of Breath and/or Wheezing (14 May 2019 16:12)  allopurinol 100 mg oral tablet: 1 tab(s) orally 4 times a week  **Tuesday, Thursday, Saturday, and Sunday*** (19 Apr 2019 12:26)  atorvastatin 20 mg oral tablet: 1 tab(s) orally once a day (19 Apr 2019 12:26)  cholestyramine 4 g/5 g oral powder for reconstitution: 1 packet(s) orally once a day (19 Apr 2019 12:26)  Claritin 10 mg oral tablet: 1 tab(s) orally once a day (19 Apr 2019 12:26)  dilTIAZem 240 mg/24 hours oral capsule, extended release: 1 cap(s) orally once a day (01 Sep 2019 14:58)  Emla 2.5%-2.5% topical cream: Apply topically to affected area befor dialysis (19 Apr 2019 12:26)  Flonase 50 mcg/inh nasal spray: 2 spray(s) in each nostril once a day (19 Apr 2019 12:26)  gabapentin 300 mg oral capsule: 1 cap(s) orally once a day (14 May 2019 16:12)  guaiFENesin 100 mg/5 mL oral liquid: 5 milliliter(s) orally every 6 hours, As needed, Cough (14 May 2019 16:13)  methotrexate 10 mg oral tablet: 1 tab(s) orally once a week (01 Sep 2019 15:04)  nitroglycerin: 0.4 milligram(s) orally , As Needed (19 Apr 2019 12:26)  predniSONE 5 mg oral tablet: 1 tab(s) orally once a day (01 Sep 2019 14:56)  Santyl 250 units/g topical ointment: Apply topically to affected area as directed (19 Apr 2019 12:26)  warfarin 3 mg oral tablet: 1 tab(s) orally once a day (19 Apr 2019 12:26)    MEDICATIONS  (STANDING):  buDESOnide    Inhalation Suspension 0.5 milliGRAM(s) Inhalation two times a day  cefepime  Injectable. 1000 milliGRAM(s) IV Push daily  epoetin nelly Injectable 59023 Unit(s) IV Push <User Schedule>  filgrastim-sndz Injectable 480 MICROGram(s) SubCutaneous daily  finasteride 5 milliGRAM(s) Oral daily  fluticasone propionate 50 MICROgram(s)/spray Nasal Spray 1 Spray(s) Both Nostrils daily  pantoprazole  Injectable 40 milliGRAM(s) IV Push two times a day  vancomycin    Solution 125 milliGRAM(s) Oral every 6 hours    MEDICATIONS  (PRN):  acetaminophen   Tablet .. 650 milliGRAM(s) Oral every 6 hours PRN Temp greater or equal to 38C (100.4F), Mild Pain (1 - 3)  ALBUTerol    90 MICROgram(s) HFA Inhaler 2 Puff(s) Inhalation every 6 hours PRN Shortness of Breath and/or Wheezing      Allergies    Zosyn (Rash)    Intolerances      SOCIAL HISTORY:  Denies ETOh,Smoking,     FAMILY HISTORY:  Family history of colorectal cancer  Family history of diabetes mellitus (Sibling)  Family history of hypertension      REVIEW OF SYSTEMS:    CONSTITUTIONAL: weak ++   EYES/ENT: No visual changes;  No vertigo or throat pain   NECK: No pain or stiffness  RESPIRATORY: no cough   CARDIOVASCULAR: No chest pain or palpitations  GASTROINTESTINAL: No abdominal or epigastric pain. + melena .  GENITOURINARY: No dysuria, frequency or hematuria  NEUROLOGICAL: No numbness or weakness  SKIN: No itching, burning, rashes, or lesions   All other review of systems is negative unless indicated above.    Vital Signs Last 24 Hrs  T(C): 36.5 (03 Sep 2019 09:38), Max: 37.5 (02 Sep 2019 21:28)  T(F): 97.7 (03 Sep 2019 09:38), Max: 99.5 (02 Sep 2019 21:28)  HR: 83 (03 Sep 2019 09:00) (71 - 101)  BP: 110/40 (03 Sep 2019 09:00) (110/40 - 150/43)  BP(mean): 59 (03 Sep 2019 09:00) (59 - 87)  RR: 14 (03 Sep 2019 09:00) (12 - 22)  SpO2: 98% (03 Sep 2019 09:00) (95% - 100%)      PHYSICAL EXAM:  comfortable awake alert    Constitutional: NAD  HEENT: , EOMI,  MMM  Neck: No LAD, No JVD  Respiratory: coarse breath sounds   Cardiovascular: S1 and S2  Gastrointestinal: BS+, soft, NT/ND  Extremities: right AKA , left left ext - chronic changes  Neurological: A/O x 3,  Skin: No rashes  Access: RUE AVF + thrill and bruit                    9.0    0.30  )-----------( 15       ( 03 Sep 2019 08:28 )             28.9     09-03    143  |  107  |  46<H>  ----------------------------<  75  3.6   |  25  |  2.26<H>    Ca    7.9<L>      03 Sep 2019 08:28  Phos  5.0     09-01  Mg     1.9     09-03
COVERING FOR DR Zamora et al     84 y.o. Male with PMHx of ESRD on HD (MWF) at [facility] , HTN, RA on MTX and steroids, R AKA, Hx of severe CDI and megacolon, Severe PAD s/p LLE endarterectomy complicated by left groin infections s/p Tx and muscle flap, recent Dx of NSCLC not treated yet and planned for XRT this month sent from SNF due to fever up to 104F and black tarry stools this am as well as dry blood/clots in mouth noted by daughter.   Pt was lethargic day prior, c/o lower back pain. Found to have coagulopathy with INR>8, guaiac +, severe neutropenia, RUL/RLL infiltrates, increased in size RML mass. In ED pt was febrile, tachycardic and hypotensive SBP 70s' and required IVF bolus.  Repeat Hb < 7 now - will need 2 Units PRBC    At the time of evaluation pt is more alert, shivering, c/o lower back pain with slightly improved BP   daughter at bedside     9/   s/p acute HD last night for hyperkalemia and additional K load with PRBC      today    seen on HD    feeling ok   sob - minimal   no cough  +weak    BCX + GNR    uf goal 2 Liters as BP allows    PRBC 1 unit w HD    no tarry stools overnight     PAST MEDICAL & SURGICAL HISTORY:  Rheumatoid arthritis  Above knee amputation of right lower extremity  Recurrent Clostridium difficile diarrhea  Diastolic CHF  Peripheral vascular disease  Afib  Anemia  CKD (chronic kidney disease)  COPD (chronic obstructive pulmonary disease)  SHAYY (obstructive sleep apnea)  Sepsis, due to unspecified organism: 2/2 poorly healing wounds b/l  Dyspepsia: On moderate exertion.  Sleep apnea, obstructive: Requires home 02 therapy, and treatment with BIPAP  Atelectasis  Pleural effusion, bilateral  Respiratory failure  Peripheral edema  CRI (chronic renal insufficiency)  Gout  Benign prostatic hypertrophy  Spinal stenosis  Hypercholesterolemia  GERD (gastroesophageal reflux disease)  CAD (coronary artery disease)  Hypertension  S/P angioplasty with stent  Cataract of left eye  Prostate: Surgery green light procedure.  S/P rotator cuff surgery: Right  S/P angioplasty    Home Medications:  acetaminophen 325 mg oral tablet: 2 tab(s) orally every 6 hours, As needed, Temp greater or equal to 38C (100.4F), Mild Pain (1 - 3) (2019 12:26)  albuterol 90 mcg/inh inhalation aerosol: 2 puff(s) inhaled every 6 hours, As needed, Shortness of Breath and/or Wheezing (14 May 2019 16:12)  allopurinol 100 mg oral tablet: 1 tab(s) orally 4 times a week  **Tuesday, Thursday, Saturday, and *** (2019 12:26)  atorvastatin 20 mg oral tablet: 1 tab(s) orally once a day (2019 12:26)  cholestyramine 4 g/5 g oral powder for reconstitution: 1 packet(s) orally once a day (2019 12:26)  Claritin 10 mg oral tablet: 1 tab(s) orally once a day (2019 12:26)  dilTIAZem 240 mg/24 hours oral capsule, extended release: 1 cap(s) orally once a day (01 Sep 2019 14:58)  Emla 2.5%-2.5% topical cream: Apply topically to affected area befor dialysis (2019 12:26)  Flonase 50 mcg/inh nasal spray: 2 spray(s) in each nostril once a day (2019 12:26)  gabapentin 300 mg oral capsule: 1 cap(s) orally once a day (14 May 2019 16:12)  guaiFENesin 100 mg/5 mL oral liquid: 5 milliliter(s) orally every 6 hours, As needed, Cough (14 May 2019 16:13)  methotrexate 10 mg oral tablet: 1 tab(s) orally once a week (01 Sep 2019 15:04)  nitroglycerin: 0.4 milligram(s) orally , As Needed (2019 12:26)  predniSONE 5 mg oral tablet: 1 tab(s) orally once a day (01 Sep 2019 14:56)  Santyl 250 units/g topical ointment: Apply topically to affected area as directed (2019 12:26)  warfarin 3 mg oral tablet: 1 tab(s) orally once a day (2019 12:26)    MEDICATIONS  (STANDING):  buDESOnide    Inhalation Suspension 0.5 milliGRAM(s) Inhalation two times a day  cefepime  Injectable. 1000 milliGRAM(s) IV Push daily  epoetin nelly Injectable 29075 Unit(s) IV Push <User Schedule>  filgrastim-sndz Injectable 480 MICROGram(s) SubCutaneous daily  finasteride 5 milliGRAM(s) Oral daily  fluticasone propionate 50 MICROgram(s)/spray Nasal Spray 1 Spray(s) Both Nostrils daily  pantoprazole  Injectable 40 milliGRAM(s) IV Push two times a day  vancomycin    Solution 125 milliGRAM(s) Oral every 6 hours    MEDICATIONS  (PRN):  acetaminophen   Tablet .. 650 milliGRAM(s) Oral every 6 hours PRN Temp greater or equal to 38C (100.4F), Mild Pain (1 - 3)  ALBUTerol    90 MICROgram(s) HFA Inhaler 2 Puff(s) Inhalation every 6 hours PRN Shortness of Breath and/or Wheezing      Allergies    Zosyn (Rash)    Intolerances      SOCIAL HISTORY:  Denies ETOh,Smoking,     FAMILY HISTORY:  Family history of colorectal cancer  Family history of diabetes mellitus (Sibling)  Family history of hypertension      REVIEW OF SYSTEMS:    CONSTITUTIONAL: weak ++   EYES/ENT: No visual changes;  No vertigo or throat pain   NECK: No pain or stiffness  RESPIRATORY: no cough   CARDIOVASCULAR: No chest pain or palpitations  GASTROINTESTINAL: No abdominal or epigastric pain. + melena .  GENITOURINARY: No dysuria, frequency or hematuria  NEUROLOGICAL: No numbness or weakness  SKIN: No itching, burning, rashes, or lesions   All other review of systems is negative unless indicated above.    Vital Signs Last 24 Hrs  T(C): 36.6 (02 Sep 2019 12:05), Max: 37.2 (01 Sep 2019 15:30)  T(F): 97.8 (02 Sep 2019 12:05), Max: 98.9 (01 Sep 2019 15:30)  HR: 81 (02 Sep 2019 12:05) (67 - 107)  BP: 110/51 (02 Sep 2019 12:05) (96/40 - 134/44)  BP(mean): 85 (02 Sep 2019 10:01) (63 - 85)  RR: 14 (02 Sep 2019 12:05) (11 - 24)  SpO2: 100% (02 Sep 2019 12:05) (90% - 100%)    I&O's Summary    01 Sep 2019 07:01  -  02 Sep 2019 07:00  --------------------------------------------------------  IN: 1916 mL / OUT: 0 mL / NET: 1916 mL      PHYSICAL EXAM:    Constitutional: NAD  HEENT: , EOMI,  MMM  Neck: No LAD, No JVD  Respiratory: coarse breath sounds   Cardiovascular: S1 and S2  Gastrointestinal: BS+, soft, NT/ND  Extremities: right AKA , left left ext - chronic changes  Neurological: A/O x 3,  Skin: No rashes  Access: RUE AVF + thrill and bruit     LABS:                 142    |  105    |  59     ----------------------------<  106       02 Sep 2019 03:48  4.8     |  30     |  2.61     136    |  103    |  102    ----------------------------<  102       01 Sep 2019 18:10  6.0     |  24     |  4.11     138    |  102    |  95     ----------------------------<  105       01 Sep 2019 15:13  5.4     |  20     |  4.12     Ca    7.7        02 Sep 2019 03:48  Ca    7.4        01 Sep 2019 18:10    Phos  5.0       01 Sep 2019 18:10                          7.8    0.34  )-----------( 15       ( 02 Sep 2019 03:48 )             25.6                         7.7    x     )-----------( x        ( 02 Sep 2019 00:44 )             24.6       Urine Studies:  Urinalysis Basic - ( 01 Sep 2019 11:27 )    Color: Brown / Appearance: very cloudy / S.015 / pH: x  Gluc: x / Ketone: Trace  / Bili: Moderate / Urobili: Negative mg/dL   Blood: x / Protein: 100 mg/dL / Nitrite: Negative   Leuk Esterase: Moderate / RBC: 6-10 /HPF / WBC >50   Sq Epi: x / Non Sq Epi: Occasional / Bacteria: Few      RADIOLOGY & ADDITIONAL STUDIES:        EXAM:  CT ABDOMEN AND PELVIS                          EXAM:  CT CHEST                            *** ADDENDUM 2019  ***    This addendum is dated 2019 at 12:49 PM.    There is some edema in the right paracolic gutter and adjacent to the   mesentery of the ascending colon. There is no colonic wall thickening in   this region but the possibility of mild colitis involving the ascending   colon is considered.    Differential diagnosis for the appearance of the focal thickening of the   right lateral wall of the rectum is focal proctitis or tumor. Again   direct visualization is recommended when clinically feasible.      *** END OF ADDENDUM 2019  ***      PROCEDURE DATE:  2019          INTERPRETATION:  Clinical information:Sepsis and lung cancer, back pain   and GI bleed.    Comparison: PET/CT dated 2019    PROCEDURE:   CT of the Chest, abdomen and pelvis was performed without intravenous   contrast.  Oral contrast was not administered.        FINDINGS:    CHEST:    LUNG AND LARGE AIRWAYS: Interval development of opacities in the right   upper lobe posteriorly likely on the basis of pneumonia. The mass in the   right middle lobe measures 7.1 x 5.5 cm previously 6.4 x 4.5 cm. Interval   development of opacities in the right lower lobe, likely on the basis of   pneumonia. There is atelectasis involving the left lower lobe medially.   There is a small amount of atelectasis involving the lingula.  PLEURA: within normal limits.  VESSELS: Atherosclerotic changes in the aorta and coronary arteries  HEART: normal size. No pericardial effusion.  MEDIASTINUM AND [place]: within normal limits.  CHEST WALL AND LOWER NECK: There is a new subcutaneous nodule in the left   anterior chest wall measuring 1.1 cm. A mediallylocated left anterior   chest wall nodule measuring 8 mm is stable. A 4.6 cm left lobe thyroid   nodule is stable.    ABDOMEN:  LIVER: Calcified granuloma.  BILE DUCTS: normal caliber.  GALLBLADDER: Distended containing a gallstone.  PANCREAS: withinnormal limits.  SPLEEN: within normal limits.  ADRENALS: within normal limits.  KIDNEYS: Bilateral renal hypodensities measuring up to 4.3 cm on the   right. A 1.4 cm hyperattenuating lesion in the upper pole of the right   kidney is again noted and may represent hemorrhagic cyst.    PELVIS:  REPRODUCTIVE ORGANS: no pelvic masses.  URETERS: within normal limits.  BLADDER: within normal limits.      BOWEL: There is a metallic clip again noted within the gastric fundus.   The colon is distended containing stool and air. There is asymmetric wall   thickening of the right side of the rectum.  PERITONEUM: no ascites or free air, no fluid collection.  VESSELS: Atherosclerotic changes. There is an IVC filter.  RETROPERITONEUM: No enlarged retroperitoneal or pelvic nodes.  ABDOMINAL WALL: There are postsurgical changes in both inguinal regions   again noted, left greater than right.  BONES: There is a mild to moderate anterior wedging deformity of the T12   vertebral body.    IMPRESSION: Increasein size of the right middle lobe lung mass.    New opacities in the right upper lobe and right lower lobe likely on the   basis of pneumonia. Tumor infiltration not excluded.    Areas of atelectasis involving the left lower lobe and lingula now noted.    2 subcutaneous nodules left anterior chest wall one of which is new and   for which metastatic disease is in the differential diagnosis.    Distended colon containing stool and air.    Asymmetric wall thickening right lateral rectum. This could represent   focal proctitis, however, direct visualization is suggested when   clinically feasible.        ***Please see the addendum at the top of this report. It may contain   additional important information or changes.****          JOMAR CARR M.D., ATTENDING RADIOLOGIST  This document has been electronically signed. Sep  1 2019 12:40PM  Addend:  JOMAR CARR M.D., ATTENDING RADIOLOGIST  This addendum was electronically signed on: Sep  1 2019 12:51PM.            Culture - Blood (19 @ 11:17)    -  Escherichia coli: Detec    Gram Stain:   Growth in aerobic bottle: Gram Negative Rods  Growth in anaerobic bottle: Gram Negative Rods    Specimen Source: .Blood Blood-Peripheral    Organism: Blood Culture PCR    Culture Results:   Growth in aerobic bottle: Gram Negative Rods  Growth in anaerobic bottle: Gram Negative Rods  "Due to technical problems, Proteus sp. will Not be reported as part of
HPI: Pt seen and examined this am in follow up for sx and GOC, daughters at bedside. Pt awake but lethargic and appears uncomfortable, calling for God softly intermittently. Pt endorses pain in his backside, though is unable to quantify/qualify further. Unable to gather further history from him.     Spoke separately with daughters. They explain that pain seems to be escalating and they were hesitant to allow dilaudid prior to pt's sister's visit this am at 11am. We spoke about this some, encouraging them to allow pt's comfort and reassuring them that we would work with them on a smaller dose in hopes of preserving his wakefulness for meaningful visit. However, they understand that this it is sometimes difficult to balance wakefulness and comfort. They were clear that when it came down to it, what mattered more to them at this point was comfort. They discussed his plan with Dr. Zamora and Dr. Yin yesterday, feeling that they both agreed that the best way to allow pt to pass peacefully would be to allow discontinuation of dialysis. They agree with this as pt had a tough time in dialysis, telling them during the procedure that he was done and that he did not want to continue. Let them know that this message was a gift, taking the burden off of them to have to decide on their own. They are awaiting their last sister Ivis who is also HCP1 to arrive this afternoon to confirm with her that she also agrees (which she seemed to over the phone during GOC meeting yesterday). They inquired about hospice plan after this, worried that he would not make it there. Reassured them that we would take things one step at a time, suggesting adjustment of comfort meds today, no further blood draws, or imaging (moving him is very painful now), and then reassessing his vitals in am to see if he could even be stable enough to go. They agreed with all of this, knowing that all of us are here with them through this difficult time.       PAIN: as above  DYSPNEA: denies      ROS:  Unable to gather further due to AMS    PHYSICAL EXAM:    Vital Signs Last 24 Hrs  T(C): 37.1 (10 Sep 2019 05:09), Max: 37.1 (09 Sep 2019 10:10)  T(F): 98.8 (10 Sep 2019 05:09), Max: 98.8 (09 Sep 2019 10:10)  HR: 84 (10 Sep 2019 05:09) (70 - 104)  BP: 113/37 (10 Sep 2019 05:09) (98/67 - 132/44)  RR: 18 (10 Sep 2019 05:09) (17 - 18)  SpO2: 94% (10 Sep 2019 05:09) (94% - 94%)  Daily Weight in k.5 (10 Sep 2019 05:09)    PPSV2:  20-30 %    General: Elderly male lying in bed, awake, alert, confused, but pleasant and cooperative  Mental Status: AOx2 (self and place)  HEENT: dmm, +mucosal ulcers most prominent along left edge of upper lip, nc in place  Lungs: dec at base bl  Cardiac: +s1 s2 rrr  GI: soft mild distention, +bs, nt  : incontinent  Ext: RAKA, chronic stasis changes over LLE  Neuro: confused but no focal deficits    LABS:                        9.0    1.88  )-----------( 34       ( 09 Sep 2019 10:10 )             28.8         134<L>  |  98  |  75<H>  ----------------------------<  108<H>  4.7   |  27  |  4.19<H>    Ca    8.6      09 Sep 2019 10:10  Phos  3.6         TPro  x   /  Alb  1.7<L>  /  TBili  x   /  DBili  x   /  AST  x   /  ALT  x   /  AlkPhos  x       PT/INR - ( 10 Sep 2019 07:23 )   PT: 47.2 sec;   INR: 4.07 ratio           Albumin: Albumin, Serum: 1.7 g/dL ( @ 10:10)      Allergies    Zosyn (Rash)    Intolerances      MEDICATIONS  (STANDING):  acetaminophen   Tablet .. 650 milliGRAM(s) Oral every 12 hours  cefepime  Injectable. 1000 milliGRAM(s) IV Push daily  diltiazem    Tablet 60 milliGRAM(s) Oral every 6 hours  epoetin nelly Injectable 89955 Unit(s) IV Push <User Schedule>  fentaNYL   Patch  12 MICROgram(s)/Hr 1 Patch Transdermal every 72 hours  filgrastim-sndz Injectable 480 MICROGram(s) SubCutaneous daily  finasteride 5 milliGRAM(s) Oral daily  fluticasone propionate 50 MICROgram(s)/spray Nasal Spray 1 Spray(s) Both Nostrils daily  lidocaine   Patch 1 Patch Transdermal daily  pantoprazole  Injectable 40 milliGRAM(s) IV Push two times a day  vancomycin    Solution 125 milliGRAM(s) Oral every 6 hours    MEDICATIONS  (PRN):  acetaminophen   Tablet .. 650 milliGRAM(s) Oral every 6 hours PRN Temp greater or equal to 38C (100.4F), Mild Pain (1 - 3)  ALBUTerol    90 MICROgram(s) HFA Inhaler 2 Puff(s) Inhalation every 6 hours PRN Shortness of Breath and/or Wheezing  HYDROmorphone  Injectable 0.2 milliGRAM(s) IV Push every 3 hours PRN moderate pain or dyspnea  HYDROmorphone  Injectable 0.5 milliGRAM(s) IV Push every 3 hours PRN severe pain or dyspnea  LORazepam   Injectable 0.25 milliGRAM(s) IV Push every 4 hours PRN anxiety or agitation      RADIOLOGY:
HPI: Pt seen and examined this am in follow up for sx and GOC, daughters at bedside. Pt lethargic, but awakens easily to voice. Pt able to greet interviewer, say good morning, denied pain or dyspnea at this time, and was able to follow some commands. Daughters note IV meds still effective for pain control.     Family also confirms interest in [facility], concerned about vital signs. Two sets of vitals taken, but team skeptical about accuracy given pt has severe PVD. Suggested we send [facility] referral, see if they even have a bed today, and if yes, then to recheck vitals (as this is painful for pt). If they do have a bed and vitals stable, family is open to transfer. If not, plan to swap to air mattress and continue comfort measures here. Dr. Bach was present for this portion of conversation, agreeing with plan. D/w floor DAYTON Ray and RN Kenna.       PAIN: denies  DYSPNEA: denies      ROS:  Otherwise limited by mentation      PHYSICAL EXAM:    Vital Signs Last 24 Hrs  T(C): 36.4 (15 Sep 2019 12:32), Max: 36.4 (15 Sep 2019 12:32)  T(F): 97.5 (15 Sep 2019 12:32), Max: 97.5 (15 Sep 2019 12:32)  HR: 89 (16 Sep 2019 09:52) (89 - 133)  BP: 188/42 (16 Sep 2019 09:52) (80/25 - 188/42)  RR: 16 (16 Sep 2019 09:50) (16 - 18)  SpO2: 98% (15 Sep 2019 12:32) (98% - 98%)       PPSV2:  10-20 %    General: Elderly male lying in bed, lethargic, opening eyes at times, appears comfortable  Mental Status: unable to gather  HEENT: dmm, nc out (for comfort)  Lungs: dec at base bl, some transmitted upper airway signs  Cardiac: +s1 s2 rrr  GI: soft mild distention, +bs, nt  : incontinent  Ext: R AKA, chronic stasis changes over LLE  Neuro: limited by lethargy      LABS: none, in accordance with comfort measures      Allergies  Zosyn (Rash)  Intolerances      MEDICATIONS  (STANDING):  epoetin nelly Injectable 93222 Unit(s) IV Push <User Schedule>  fentaNYL   Patch  12 MICROgram(s)/Hr 1 Patch Transdermal every 72 hours  fluconAZOLE IVPB 100 milliGRAM(s) IV Intermittent every 48 hours  glycopyrrolate Injectable 0.2 milliGRAM(s) IV Push every 6 hours  lidocaine   Patch 1 Patch Transdermal daily    MEDICATIONS  (PRN):  acetaminophen   Tablet .. 650 milliGRAM(s) Oral every 6 hours PRN Temp greater or equal to 38C (100.4F), Mild Pain (1 - 3)  ALBUTerol    90 MICROgram(s) HFA Inhaler 2 Puff(s) Inhalation every 6 hours PRN Shortness of Breath and/or Wheezing  HYDROmorphone  Injectable 0.5 milliGRAM(s) IV Push every 2 hours PRN Severe Pain (7 - 10)  HYDROmorphone  Injectable 0.2 milliGRAM(s) IV Push every 3 hours PRN moderate pain or dyspnea  LORazepam   Injectable 0.25 milliGRAM(s) IV Push every 4 hours PRN anxiety or agitation      RADIOLOGY:
HPI: Pt seen and examined this am in follow up for sx and GOC, family at bedside. Pt lethargic, opening eyes at times, but not responding verbally. Pt appears very comfortable. Family endorsing this also, noting back pain overnight, treated effectively with IV dilaudid. Family noting pt's breathing is slowing, not appearing uncomfortable, but to them (and us) this seems to be heralding the end of his life. No other issues.     Daughters confirmed plans to stop dialysis. They would prefer pt stay here, feeling like he is close to death and is very comfortable here with his long-time clinicians. However, they are willing to reconsider this if pt's sx escalate/after a few days. Reassured them that we would respect their wishes, agreeing that pt is likely close to the end of his life, though this process is different for every pt.       PAIN: no nonverbal signs of pain  DYSPNEA: no nonverbal signs of dyspnea      ROS:    Unable to gather 2/2 to AMS      PHYSICAL EXAM:    Vital Signs Last 24 Hrs  T(C): --  T(F): --  HR: --  BP: 139/20 (10 Sep 2019 18:01) (139/20 - 139/20)  BP(mean): --  RR: --  SpO2: --  Daily     Daily     PPSV2:  10-20 %    General: Elderly male lying in bed, lethargic, opening eyes at times, appears comfortable  Mental Status: unable to gather  HEENT: dmm, +mucosal ulcers most prominent along left edge of upper lip, nc out (for comfort)  Lungs: dec at base bl  Cardiac: +s1 s2 rrr  GI: soft mild distention, +bs, nt  : incontinent  Ext: R AKA, chronic stasis changes over LLE  Neuro: confused but no focal deficits    LABS:                        9.0    1.88  )-----------( 34       ( 09 Sep 2019 10:10 )             28.8     09-09    134<L>  |  98  |  75<H>  ----------------------------<  108<H>  4.7   |  27  |  4.19<H>    Ca    8.6      09 Sep 2019 10:10  Phos  3.6     09-09    TPro  x   /  Alb  1.7<L>  /  TBili  x   /  DBili  x   /  AST  x   /  ALT  x   /  AlkPhos  x   09-09    PT/INR - ( 10 Sep 2019 07:23 )   PT: 47.2 sec;   INR: 4.07 ratio         Albumin: Albumin, Serum: 1.7 g/dL (09-09 @ 10:10)      Allergies    Zosyn (Rash)    Intolerances      MEDICATIONS  (STANDING):  acetaminophen   Tablet .. 650 milliGRAM(s) Oral every 12 hours  diltiazem    Tablet 60 milliGRAM(s) Oral every 6 hours  epoetin nelly Injectable 98544 Unit(s) IV Push <User Schedule>  fentaNYL   Patch  12 MICROgram(s)/Hr 1 Patch Transdermal every 72 hours  finasteride 5 milliGRAM(s) Oral daily  fluconAZOLE IVPB 100 milliGRAM(s) IV Intermittent every 48 hours  lidocaine   Patch 1 Patch Transdermal daily    MEDICATIONS  (PRN):  acetaminophen   Tablet .. 650 milliGRAM(s) Oral every 6 hours PRN Temp greater or equal to 38C (100.4F), Mild Pain (1 - 3)  ALBUTerol    90 MICROgram(s) HFA Inhaler 2 Puff(s) Inhalation every 6 hours PRN Shortness of Breath and/or Wheezing  HYDROmorphone  Injectable 0.2 milliGRAM(s) IV Push every 3 hours PRN moderate pain or dyspnea  HYDROmorphone  Injectable 0.5 milliGRAM(s) IV Push every 3 hours PRN severe pain or dyspnea  LORazepam   Injectable 0.25 milliGRAM(s) IV Push every 4 hours PRN anxiety or agitation
HPI: Pt seen and examined this am in follow up for sx, daughters at bedside. Pt lethargic, opens eyes briefly. Endorsed pain just prior to encounter, getting pain meds. Now appearing comfortable. Daughters also noticing some cough and gurgling at times, open to glyco. Daughters briefly brought up inpt hospice transfer, noting they recalled this being offered before. Let them know that this is still the recommendation, but they must be on board with this to allow referral. They will discuss further and floor SW will check in. They are ok with checking vital signs if they do opt to pursue transfer to be sure that pt is indeed stable enough to go, which floor team was made aware of.       PAIN: as above  DYSPNEA: no nonverbal signs of dyspnea      ROS:  Unable to gather due to lethargy    PHYSICAL EXAM:    Vital Signs Last 24 Hrs  T(C): --  T(F): --  HR: --  BP: --  BP(mean): --  RR: --  SpO2: --  Daily     Daily     PPSV2:  10-20 %    General: Elderly male lying in bed, lethargic, opening eyes at times, appears comfortable  Mental Status: unable to gather  HEENT: dmm, nc out (for comfort)  Lungs: dec at base bl, some transmitted upper airway signs  Cardiac: +s1 s2 rrr  GI: soft mild distention, +bs, nt  : incontinent  Ext: R AKA, chronic stasis changes over LLE  Neuro: limited by lethargy    LABS: none, in accordance with comfort measures      Albumin: Albumin, Serum: 1.7 g/dL (09-09 @ 10:10)      Allergies    Zosyn (Rash)    Intolerances      MEDICATIONS  (STANDING):  epoetin nelly Injectable 49451 Unit(s) IV Push <User Schedule>  fentaNYL   Patch  12 MICROgram(s)/Hr 1 Patch Transdermal every 72 hours  fluconAZOLE IVPB 100 milliGRAM(s) IV Intermittent every 48 hours  lidocaine   Patch 1 Patch Transdermal daily    MEDICATIONS  (PRN):  acetaminophen   Tablet .. 650 milliGRAM(s) Oral every 6 hours PRN Temp greater or equal to 38C (100.4F), Mild Pain (1 - 3)  ALBUTerol    90 MICROgram(s) HFA Inhaler 2 Puff(s) Inhalation every 6 hours PRN Shortness of Breath and/or Wheezing  HYDROmorphone  Injectable 0.2 milliGRAM(s) IV Push every 3 hours PRN moderate pain or dyspnea  HYDROmorphone  Injectable 0.5 milliGRAM(s) IV Push every 3 hours PRN severe pain or dyspnea  LORazepam   Injectable 0.25 milliGRAM(s) IV Push every 4 hours PRN anxiety or agitation
HPI: Pt seen and examined this am in follow up for sx, family at bedside. Pt lethargic but able to rouse momentarily to say good morning and to deny pain. As per family pt has been very comfortable, they have no complaints.       PAIN: denies    DYSPNEA: no nonverbal signs of dyspnea      ROS:  Unable to gather further due to AMS    PHYSICAL EXAM:    Vital Signs Last 24 Hrs  T(C): --  T(F): --  HR: --  BP: --  BP(mean): --  RR: --  SpO2: --  Daily     Daily     PPSV2:  10-20 %    General: Elderly male lying in bed, lethargic, opening eyes at times, appears comfortable  Mental Status: unable to gather  HEENT: dmm, nc out (for comfort)  Lungs: dec at base bl  Cardiac: +s1 s2 rrr  GI: soft mild distention, +bs, nt  : incontinent  Ext: R AKA, chronic stasis changes over LLE  Neuro: limited by lethargy    LABS: none, in accordance with comfort measures    Albumin: Albumin, Serum: 1.7 g/dL (09-09 @ 10:10)      Allergies    Zosyn (Rash)    Intolerances      MEDICATIONS  (STANDING):  acetaminophen   Tablet .. 650 milliGRAM(s) Oral every 12 hours  diltiazem    Tablet 60 milliGRAM(s) Oral every 6 hours  epoetin nelly Injectable 52452 Unit(s) IV Push <User Schedule>  fentaNYL   Patch  12 MICROgram(s)/Hr 1 Patch Transdermal every 72 hours  finasteride 5 milliGRAM(s) Oral daily  fluconAZOLE IVPB 100 milliGRAM(s) IV Intermittent every 48 hours  lidocaine   Patch 1 Patch Transdermal daily    MEDICATIONS  (PRN):  acetaminophen   Tablet .. 650 milliGRAM(s) Oral every 6 hours PRN Temp greater or equal to 38C (100.4F), Mild Pain (1 - 3)  ALBUTerol    90 MICROgram(s) HFA Inhaler 2 Puff(s) Inhalation every 6 hours PRN Shortness of Breath and/or Wheezing  HYDROmorphone  Injectable 0.2 milliGRAM(s) IV Push every 3 hours PRN moderate pain or dyspnea  HYDROmorphone  Injectable 0.5 milliGRAM(s) IV Push every 3 hours PRN severe pain or dyspnea  LORazepam   Injectable 0.25 milliGRAM(s) IV Push every 4 hours PRN anxiety or agitation
INTERVAL HISTORY:  Patient resting comfortable. Discussed case with patients daughter at bedside.   Patient continues to have discomfort in Left lower extremity   REVIEW OF SYSTEMS:      Allergies    Zosyn (Rash)    Intolerances        MEDICATIONS  (STANDING):  buDESOnide    Inhalation Suspension 0.5 milliGRAM(s) Inhalation two times a day  cefepime  Injectable. 1000 milliGRAM(s) IV Push daily  diltiazem    Tablet 30 milliGRAM(s) Oral every 6 hours  epoetin nelly Injectable 75883 Unit(s) IV Push <User Schedule>  filgrastim-sndz Injectable 480 MICROGram(s) SubCutaneous daily  finasteride 5 milliGRAM(s) Oral daily  fluticasone propionate 50 MICROgram(s)/spray Nasal Spray 1 Spray(s) Both Nostrils daily  pantoprazole  Injectable 40 milliGRAM(s) IV Push two times a day  vancomycin    Solution 125 milliGRAM(s) Oral every 6 hours    MEDICATIONS  (PRN):  acetaminophen   Tablet .. 650 milliGRAM(s) Oral every 6 hours PRN Temp greater or equal to 38C (100.4F), Mild Pain (1 - 3)  ALBUTerol    90 MICROgram(s) HFA Inhaler 2 Puff(s) Inhalation every 6 hours PRN Shortness of Breath and/or Wheezing  methocarbamol 500 milliGRAM(s) Oral every 8 hours PRN mild pain  oxyCODONE    5 mG/acetaminophen 325 mG 1 Tablet(s) Oral every 4 hours PRN Moderate Pain (4 - 6)      Vital Signs Last 24 Hrs  T(C): 37.3 (03 Sep 2019 16:36), Max: 37.7 (03 Sep 2019 13:57)  T(F): 99.1 (03 Sep 2019 16:36), Max: 99.9 (03 Sep 2019 13:57)  HR: 90 (03 Sep 2019 18:00) (78 - 101)  BP: 132/49 (03 Sep 2019 18:00) (110/40 - 150/43)  BP(mean): 73 (03 Sep 2019 18:00) (59 - 87)  RR: 22 (03 Sep 2019 18:00) (14 - 22)  SpO2: 94% (03 Sep 2019 18:00) (92% - 100%)    PHYSICAL EXAM:    GENERAL: NAD,   HEAD:  Atraumatic, Normocephalic  EYES: EOMI, PERRLA, conjunctiva and sclera clear  NECK: Supple, No JVD, Normal thyroid    CHEST/LUNG: Clear to percussion bilaterally; No rales, rhonchi,   HEART: Regular rate and rhythm;   ABDOMEN: Soft, Nontender.  EXTREMITIES:   edema:- RIGHT AKA  LYMPH: No lymphadenopathy noted        LABS:                        9.0    0.30  )-----------( 15       ( 03 Sep 2019 08:28 )             28.9     09-03    143  |  107  |  46<H>  ----------------------------<  75  3.6   |  25  |  2.26<H>    Ca    7.9<L>      03 Sep 2019 08:28  Mg     1.9     09-03      PT/INR - ( 03 Sep 2019 08:28 )   PT: 19.4 sec;   INR: 1.72 ratio                 RADIOLOGY & ADDITIONAL STUDIES:    PATHOLOGY:
INTERVAL HISTORY:  Patient resting comfortably witih daughter at bedside.   Overall no new complaints.   Patient and family considering withdrawal from HD.   REVIEW OF SYSTEMS:      Allergies    Zosyn (Rash)    Intolerances        MEDICATIONS  (STANDING):  acetaminophen   Tablet .. 650 milliGRAM(s) Oral every 12 hours  diltiazem    Tablet 60 milliGRAM(s) Oral every 6 hours  epoetin nelly Injectable 46558 Unit(s) IV Push <User Schedule>  fentaNYL   Patch  12 MICROgram(s)/Hr 1 Patch Transdermal every 72 hours  finasteride 5 milliGRAM(s) Oral daily  lidocaine   Patch 1 Patch Transdermal daily    MEDICATIONS  (PRN):  acetaminophen   Tablet .. 650 milliGRAM(s) Oral every 6 hours PRN Temp greater or equal to 38C (100.4F), Mild Pain (1 - 3)  ALBUTerol    90 MICROgram(s) HFA Inhaler 2 Puff(s) Inhalation every 6 hours PRN Shortness of Breath and/or Wheezing  HYDROmorphone  Injectable 0.2 milliGRAM(s) IV Push every 3 hours PRN moderate pain or dyspnea  HYDROmorphone  Injectable 0.5 milliGRAM(s) IV Push every 3 hours PRN severe pain or dyspnea  LORazepam   Injectable 0.25 milliGRAM(s) IV Push every 4 hours PRN anxiety or agitation      Vital Signs Last 24 Hrs  T(C): 37.1 (10 Sep 2019 05:09), Max: 37.1 (10 Sep 2019 05:09)  T(F): 98.8 (10 Sep 2019 05:09), Max: 98.8 (10 Sep 2019 05:09)  HR: 84 (10 Sep 2019 05:09) (84 - 96)  BP: 139/20 (10 Sep 2019 18:01) (109/37 - 139/20)  BP(mean): --  RR: 18 (10 Sep 2019 05:09) (18 - 18)  SpO2: 94% (10 Sep 2019 05:09) (94% - 94%)    PHYSICAL EXAM:    GENERAL: NAD,   HEAD:  Atraumatic, Normocephalic  EYES: EOMI, PERRLA, conjunctiva and sclera clear    NECK: Supple, No JVD, Normal thyroid  NERVOUS SYSTEM:    CHEST/LUNG: Clear to percussion bilaterally; No rales, rhonchi,   HEART: Regular rate and rhythm;   ABDOMEN: Soft, Nontender.  EXTREMITIES:   edema:- R AKA   LYMPH: No lymphadenopathy noted        LABS:                        9.0    1.88  )-----------( 34       ( 09 Sep 2019 10:10 )             28.8     09-09    134<L>  |  98  |  75<H>  ----------------------------<  108<H>  4.7   |  27  |  4.19<H>    Ca    8.6      09 Sep 2019 10:10  Phos  3.6     09-09    TPro  x   /  Alb  1.7<L>  /  TBili  x   /  DBili  x   /  AST  x   /  ALT  x   /  AlkPhos  x   09-09    PT/INR - ( 10 Sep 2019 07:23 )   PT: 47.2 sec;   INR: 4.07 ratio                 RADIOLOGY & ADDITIONAL STUDIES:    PATHOLOGY:
INTERVAL HISTORY:  Patient seen and examined. patient cotinues   REVIEW OF SYSTEMS:      Allergies    Zosyn (Rash)    Intolerances        MEDICATIONS  (STANDING):  acetaminophen   Tablet .. 650 milliGRAM(s) Oral every 12 hours  buDESOnide    Inhalation Suspension 0.5 milliGRAM(s) Inhalation two times a day  cefepime  Injectable. 1000 milliGRAM(s) IV Push daily  diltiazem    Tablet 60 milliGRAM(s) Oral every 6 hours  epoetin nelly Injectable 13677 Unit(s) IV Push <User Schedule>  fentaNYL   Patch  12 MICROgram(s)/Hr 1 Patch Transdermal every 72 hours  finasteride 5 milliGRAM(s) Oral daily  fluticasone propionate 50 MICROgram(s)/spray Nasal Spray 1 Spray(s) Both Nostrils daily  lidocaine   Patch 1 Patch Transdermal daily  nystatin    Suspension 353230 Unit(s) Swish and Swallow every 6 hours  pantoprazole  Injectable 40 milliGRAM(s) IV Push two times a day  potassium phosphate / sodium phosphate powder 1 Packet(s) Oral two times a day  vancomycin    Solution 125 milliGRAM(s) Oral every 6 hours    MEDICATIONS  (PRN):  acetaminophen   Tablet .. 650 milliGRAM(s) Oral every 6 hours PRN Temp greater or equal to 38C (100.4F), Mild Pain (1 - 3)  ALBUTerol    90 MICROgram(s) HFA Inhaler 2 Puff(s) Inhalation every 6 hours PRN Shortness of Breath and/or Wheezing  HYDROmorphone  Injectable 0.5 milliGRAM(s) IV Push every 3 hours PRN Moderate Pain (4 - 6)  oxyCODONE    5 mG/acetaminophen 325 mG 2 Tablet(s) Oral every 4 hours PRN Moderate Pain (4 - 6)      Vital Signs Last 24 Hrs  T(C): 36.9 (07 Sep 2019 16:43), Max: 37.4 (07 Sep 2019 04:43)  T(F): 98.5 (07 Sep 2019 16:43), Max: 99.4 (07 Sep 2019 04:43)  HR: 89 (07 Sep 2019 16:00) (72 - 106)  BP: 126/50 (07 Sep 2019 16:00) (95/54 - 139/65)  BP(mean): 73 (07 Sep 2019 16:00) (57 - 89)  RR: 21 (07 Sep 2019 16:00) (13 - 26)  SpO2: 100% (07 Sep 2019 16:00) (89% - 100%)    PHYSICAL EXAM:    GENERAL: NAD,   HEAD:  Atraumatic, Normocephalic  EYES: EOMI, PERRLA, conjunctiva and sclera clear  NECK: Supple, No JVD, Normal thyroid  NERVOUS SYSTEM:    CHEST/LUNG: Clear to percussion bilaterally; No rales, rhonchi,   HEART: Regular rate and rhythm;   ABDOMEN: Soft, Nontender.  EXTREMITIES:   edema:-  LYMPH: No lymphadenopathy noted        LABS:                        9.2    0.40  )-----------( 37       ( 07 Sep 2019 06:53 )             29.0     09-07    141  |  101  |  38<H>  ----------------------------<  126<H>  4.2   |  28  |  2.28<H>    Ca    8.4<L>      07 Sep 2019 06:53  Phos  1.7     09-06    TPro  x   /  Alb  1.6<L>  /  TBili  x   /  DBili  x   /  AST  x   /  ALT  x   /  AlkPhos  x   09-06    PT/INR - ( 07 Sep 2019 06:53 )   PT: 24.3 sec;   INR: 2.14 ratio                 RADIOLOGY & ADDITIONAL STUDIES:    PATHOLOGY:
INTERVAL HISTORY:  Patient seen with daughter at bedside. Afebrile overnight. No bleeding noted.     REVIEW OF SYSTEMS:      Allergies    Zosyn (Rash)    Intolerances        MEDICATIONS  (STANDING):  buDESOnide    Inhalation Suspension 0.5 milliGRAM(s) Inhalation two times a day  cefepime  Injectable. 1000 milliGRAM(s) IV Push daily  diltiazem    Tablet 30 milliGRAM(s) Oral every 6 hours  epoetin nelly Injectable 72107 Unit(s) IV Push <User Schedule>  filgrastim-sndz Injectable 480 MICROGram(s) SubCutaneous daily  finasteride 5 milliGRAM(s) Oral daily  fluticasone propionate 50 MICROgram(s)/spray Nasal Spray 1 Spray(s) Both Nostrils daily  pantoprazole  Injectable 40 milliGRAM(s) IV Push two times a day  vancomycin    Solution 125 milliGRAM(s) Oral every 6 hours    MEDICATIONS  (PRN):  acetaminophen   Tablet .. 650 milliGRAM(s) Oral every 6 hours PRN Temp greater or equal to 38C (100.4F), Mild Pain (1 - 3)  ALBUTerol    90 MICROgram(s) HFA Inhaler 2 Puff(s) Inhalation every 6 hours PRN Shortness of Breath and/or Wheezing  methocarbamol 500 milliGRAM(s) Oral every 8 hours PRN mild pain  oxyCODONE    5 mG/acetaminophen 325 mG 1 Tablet(s) Oral every 4 hours PRN Moderate Pain (4 - 6)      Vital Signs Last 24 Hrs  T(C): 37.2 (04 Sep 2019 05:00), Max: 38 (03 Sep 2019 20:41)  T(F): 98.9 (04 Sep 2019 05:00), Max: 100.4 (03 Sep 2019 20:41)  HR: 114 (04 Sep 2019 08:00) (83 - 120)  BP: 130/59 (04 Sep 2019 08:00) (103/62 - 149/45)  BP(mean): 78 (04 Sep 2019 08:00) (59 - 90)  RR: 22 (04 Sep 2019 08:00) (14 - 24)  SpO2: 100% (04 Sep 2019 08:00) (92% - 100%)    PHYSICAL EXAM:    GENERAL: NAD,   HEAD:  Atraumatic, Normocephalic  EYES: EOMI, PERRLA, conjunctiva and sclera clear  NECK: Supple, No JVD, Normal thyroid  CHEST/LUNG: Clear to percussion bilaterally; No rales, rhonchi,   HEART: Regular rate and rhythm;   ABDOMEN: Soft, Nontender.  EXTREMITIES:   edema:-R AKA  LYMPH: No lymphadenopathy noted        LABS:                        8.6    0.29  )-----------( 11       ( 04 Sep 2019 06:39 )             27.6     09-04    139  |  104  |  72<H>  ----------------------------<  57<L>  3.9   |  21<L>  |  3.22<H>    Ca    7.7<L>      04 Sep 2019 06:39  Mg     1.9     09-03      PT/INR - ( 04 Sep 2019 06:39 )   PT: 30.0 sec;   INR: 2.62 ratio                 RADIOLOGY & ADDITIONAL STUDIES:    PATHOLOGY:
INTERVAL HISTORY:  patient seen with daughter at bedside.   no overnight events     REVIEW OF SYSTEMS:      Allergies    Zosyn (Rash)    Intolerances        MEDICATIONS  (STANDING):  buDESOnide    Inhalation Suspension 0.5 milliGRAM(s) Inhalation two times a day  cefepime  Injectable. 1000 milliGRAM(s) IV Push daily  diltiazem    Tablet 60 milliGRAM(s) Oral every 6 hours  epoetin nelly Injectable 15189 Unit(s) IV Push <User Schedule>  finasteride 5 milliGRAM(s) Oral daily  fluticasone propionate 50 MICROgram(s)/spray Nasal Spray 1 Spray(s) Both Nostrils daily  nystatin    Suspension 038473 Unit(s) Swish and Swallow every 6 hours  pantoprazole  Injectable 40 milliGRAM(s) IV Push two times a day  vancomycin    Solution 125 milliGRAM(s) Oral every 6 hours    MEDICATIONS  (PRN):  acetaminophen   Tablet .. 650 milliGRAM(s) Oral every 6 hours PRN Temp greater or equal to 38C (100.4F), Mild Pain (1 - 3)  ALBUTerol    90 MICROgram(s) HFA Inhaler 2 Puff(s) Inhalation every 6 hours PRN Shortness of Breath and/or Wheezing  methocarbamol 500 milliGRAM(s) Oral every 8 hours PRN mild pain  oxyCODONE    5 mG/acetaminophen 325 mG 1 Tablet(s) Oral every 4 hours PRN Moderate Pain (4 - 6)      Vital Signs Last 24 Hrs  T(C): 36.9 (04 Sep 2019 21:02), Max: 37 (04 Sep 2019 09:25)  T(F): 98.5 (04 Sep 2019 21:02), Max: 98.6 (04 Sep 2019 09:25)  HR: 89 (05 Sep 2019 07:00) (82 - 131)  BP: 97/57 (05 Sep 2019 03:00) (96/58 - 149/41)  BP(mean): 69 (05 Sep 2019 03:00) (62 - 74)  RR: 19 (05 Sep 2019 07:00) (16 - 29)  SpO2: 94% (05 Sep 2019 07:00) (84% - 100%)    PHYSICAL EXAM:    GENERAL: NAD,   HEAD:  Atraumatic, Normocephalic  EYES: EOMI, PERRLA, conjunctiva and sclera clear  NECK: Supple, No JVD, Normal thyroid  CHEST/LUNG: Clear to percussion bilaterally; No rales, rhonchi,   HEART: Regular rate and rhythm;   ABDOMEN: Soft, Nontender.  EXTREMITIES:   edema:- RIght AKA   LYMPH: No lymphadenopathy noted        LABS:                        8.8    0.33  )-----------( 28       ( 05 Sep 2019 06:27 )             28.4     09-05    140  |  101  |  33<H>  ----------------------------<  122<H>  3.9   |  27  |  2.09<H>    Ca    7.9<L>      05 Sep 2019 06:27      PT/INR - ( 05 Sep 2019 06:27 )   PT: 24.7 sec;   INR: 2.17 ratio                 RADIOLOGY & ADDITIONAL STUDIES:    PATHOLOGY:
NEPHROLOGY INTERVAL HPI/OVERNIGHT EVENTS:        9/6  feels well  seen on hd  platelets ordered by dr Masood leonard  daughter at bedside    9/5 SY  Tired.  Slept poorly last night.  Complains of back pain.  Eating a little better this am.    9/4 SY  No acute events overnight.  No apparent distress.  No new complaints.    HPI:  84 y.o. Male with PMHx of ESRD on HD (MWF) at [facility] , HTN, RA on MTX and steroids, R AKA, Hx of severe CDI and megacolon, Severe PAD s/p LLE endarterectomy complicated by left groin infections s/p Tx and muscle flap, recent Dx of NSCLC not treated yet and planned for XRT this month sent from SNF due to fever up to 104F and black tarry stools this am as well as dry blood/clots in mouth noted by daughter.   Pt was lethargic day prior, c/o lower back pain. Found to have coagulopathy with INR>8, guaiac +, severe neutropenia, RUL/RLL infiltrates, increased in size RML mass. In ED pt was febrile, tachycardic and hypotensive SBP 70s' and required IVF bolus.  Repeat Hb < 7 now - will need 2 Units PRBC    At the time of evaluation pt is more alert, shivering, c/o lower back pain with slightly improved BP   daughter at bedside  pt denies sob at this time       MEDICATIONS  (STANDING):  acetaminophen   Tablet .. 650 milliGRAM(s) Oral every 12 hours  buDESOnide    Inhalation Suspension 0.5 milliGRAM(s) Inhalation two times a day  cefepime  Injectable. 1000 milliGRAM(s) IV Push daily  diltiazem    Tablet 60 milliGRAM(s) Oral every 6 hours  epoetin nelly Injectable 23566 Unit(s) IV Push <User Schedule>  finasteride 5 milliGRAM(s) Oral daily  fluticasone propionate 50 MICROgram(s)/spray Nasal Spray 1 Spray(s) Both Nostrils daily  lidocaine   Patch 1 Patch Transdermal daily  nystatin    Suspension 104798 Unit(s) Swish and Swallow every 6 hours  pantoprazole  Injectable 40 milliGRAM(s) IV Push two times a day  vancomycin    Solution 125 milliGRAM(s) Oral every 6 hours    MEDICATIONS  (PRN):  acetaminophen   Tablet .. 650 milliGRAM(s) Oral every 6 hours PRN Temp greater or equal to 38C (100.4F), Mild Pain (1 - 3)  ALBUTerol    90 MICROgram(s) HFA Inhaler 2 Puff(s) Inhalation every 6 hours PRN Shortness of Breath and/or Wheezing  oxyCODONE    5 mG/acetaminophen 325 mG 1 Tablet(s) Oral every 4 hours PRN Moderate Pain (4 - 6)      ICU Vital Signs Last 24 Hrs  T(C): 37.9 (06 Sep 2019 12:11), Max: 37.9 (06 Sep 2019 12:11)  T(F): 100.3 (06 Sep 2019 12:11), Max: 100.3 (06 Sep 2019 12:11)  HR: 102 (06 Sep 2019 11:51) (83 - 111)  BP: 138/54 (06 Sep 2019 11:51) (106/51 - 140/71)  BP(mean): 82 (06 Sep 2019 06:00) (62 - 84)  ABP: --  ABP(mean): --  RR: 17 (06 Sep 2019 11:51) (16 - 25)  SpO2: 100% (06 Sep 2019 11:51) (91% - 100%)               PHYSICAL EXAM:  Alert but tired.  Slept poorly last night.  GENERAL: No distress  CHEST/LUNG: Fair air entry  HEART: S1S2 RRR  ABDOMEN: soft  EXTREMITIES: decreased edema  SKIN:     LABS:                          8.6    0.40  )-----------( 17       ( 06 Sep 2019 07:10 )             27.0                            09-06    137  |  99  |  54<H>  ----------------------------<  134<H>  4.0   |  28  |  3.13<H>    Ca    8.0<L>      06 Sep 2019 07:10  Phos  1.7     09-06    TPro  x   /  Alb  1.6<L>  /  TBili  x   /  DBili  x   /  AST  x   /  ALT  x   /  AlkPhos  x   09-06
NEPHROLOGY INTERVAL HPI/OVERNIGHT EVENTS:    Date of Service: 09-04-19 @ 09:02  9/4 SY  No acute events overnight.  No apparent distress.  No new complaints.    HPI:  84 y.o. Male with PMHx of ESRD on HD (MWF) at [facility] , HTN, RA on MTX and steroids, R AKA, Hx of severe CDI and megacolon, Severe PAD s/p LLE endarterectomy complicated by left groin infections s/p Tx and muscle flap, recent Dx of NSCLC not treated yet and planned for XRT this month sent from SNF due to fever up to 104F and black tarry stools this am as well as dry blood/clots in mouth noted by daughter.   Pt was lethargic day prior, c/o lower back pain. Found to have coagulopathy with INR>8, guaiac +, severe neutropenia, RUL/RLL infiltrates, increased in size RML mass. In ED pt was febrile, tachycardic and hypotensive SBP 70s' and required IVF bolus.  Repeat Hb < 7 now - will need 2 Units PRBC    At the time of evaluation pt is more alert, shivering, c/o lower back pain with slightly improved BP   daughter at bedside  pt denies sob at this time     MEDICATIONS  (STANDING):  buDESOnide    Inhalation Suspension 0.5 milliGRAM(s) Inhalation two times a day  cefepime  Injectable. 1000 milliGRAM(s) IV Push daily  diltiazem    Tablet 30 milliGRAM(s) Oral every 6 hours  epoetin nelly Injectable 61902 Unit(s) IV Push <User Schedule>  filgrastim-sndz Injectable 480 MICROGram(s) SubCutaneous daily  finasteride 5 milliGRAM(s) Oral daily  fluticasone propionate 50 MICROgram(s)/spray Nasal Spray 1 Spray(s) Both Nostrils daily  pantoprazole  Injectable 40 milliGRAM(s) IV Push two times a day  vancomycin    Solution 125 milliGRAM(s) Oral every 6 hours    MEDICATIONS  (PRN):  acetaminophen   Tablet .. 650 milliGRAM(s) Oral every 6 hours PRN Temp greater or equal to 38C (100.4F), Mild Pain (1 - 3)  ALBUTerol    90 MICROgram(s) HFA Inhaler 2 Puff(s) Inhalation every 6 hours PRN Shortness of Breath and/or Wheezing  methocarbamol 500 milliGRAM(s) Oral every 8 hours PRN mild pain  oxyCODONE    5 mG/acetaminophen 325 mG 1 Tablet(s) Oral every 4 hours PRN Moderate Pain (4 - 6)    Vital Signs Last 24 Hrs  T(C): 37.2 (04 Sep 2019 05:00), Max: 38 (03 Sep 2019 20:41)  T(F): 98.9 (04 Sep 2019 05:00), Max: 100.4 (03 Sep 2019 20:41)  HR: 114 (04 Sep 2019 08:00) (85 - 120)  BP: 130/59 (04 Sep 2019 08:00) (103/62 - 149/45)  BP(mean): 78 (04 Sep 2019 08:00) (65 - 90)  RR: 22 (04 Sep 2019 08:00) (14 - 24)  SpO2: 100% (04 Sep 2019 08:00) (92% - 100%)  09-03 @ 07:01  -  09-04 @ 07:00  --------------------------------------------------------  IN: 0 mL / OUT: 100 mL / NET: -100 mL    PHYSICAL EXAM:  Alert and comfortable  GENERAL: No distress  CHEST/LUNG: Fair air entry  HEART: S1S2 RRR  ABDOMEN: soft  EXTREMITIES : decreased edema  SKIN:     LABS:                        8.6    0.29  )-----------( 11       ( 04 Sep 2019 06:39 )             27.6     09-04    139  |  104  |  72<H>  ----------------------------<  57<L>  3.9   |  21<L>  |  3.22<H>    Ca    7.7<L>      04 Sep 2019 06:39  Mg     1.9     09-03      PT/INR - ( 04 Sep 2019 06:39 )   PT: 30.0 sec;   INR: 2.62 ratio                     RADIOLOGY & ADDITIONAL TESTS:
NEPHROLOGY INTERVAL HPI/OVERNIGHT EVENTS:    Date of Service: 09-05-19 @ 10:58  9/5 SY  Tired.  Slept poorly last night.  Complains of back pain.  Eating a little better this am.    9/4 SY  No acute events overnight.  No apparent distress.  No new complaints.    HPI:  84 y.o. Male with PMHx of ESRD on HD (MWF) at [facility] , HTN, RA on MTX and steroids, R AKA, Hx of severe CDI and megacolon, Severe PAD s/p LLE endarterectomy complicated by left groin infections s/p Tx and muscle flap, recent Dx of NSCLC not treated yet and planned for XRT this month sent from SNF due to fever up to 104F and black tarry stools this am as well as dry blood/clots in mouth noted by daughter.   Pt was lethargic day prior, c/o lower back pain. Found to have coagulopathy with INR>8, guaiac +, severe neutropenia, RUL/RLL infiltrates, increased in size RML mass. In ED pt was febrile, tachycardic and hypotensive SBP 70s' and required IVF bolus.  Repeat Hb < 7 now - will need 2 Units PRBC    At the time of evaluation pt is more alert, shivering, c/o lower back pain with slightly improved BP   daughter at bedside  pt denies sob at this time     MEDICATIONS  (STANDING):  buDESOnide    Inhalation Suspension 0.5 milliGRAM(s) Inhalation two times a day  cefepime  Injectable. 1000 milliGRAM(s) IV Push daily  diltiazem    Tablet 60 milliGRAM(s) Oral every 6 hours  epoetin nelly Injectable 90043 Unit(s) IV Push <User Schedule>  finasteride 5 milliGRAM(s) Oral daily  fluticasone propionate 50 MICROgram(s)/spray Nasal Spray 1 Spray(s) Both Nostrils daily  nystatin    Suspension 340709 Unit(s) Swish and Swallow every 6 hours  pantoprazole  Injectable 40 milliGRAM(s) IV Push two times a day  vancomycin    Solution 125 milliGRAM(s) Oral every 6 hours    MEDICATIONS  (PRN):  acetaminophen   Tablet .. 650 milliGRAM(s) Oral every 6 hours PRN Temp greater or equal to 38C (100.4F), Mild Pain (1 - 3)  ALBUTerol    90 MICROgram(s) HFA Inhaler 2 Puff(s) Inhalation every 6 hours PRN Shortness of Breath and/or Wheezing  methocarbamol 500 milliGRAM(s) Oral every 8 hours PRN mild pain  oxyCODONE    5 mG/acetaminophen 325 mG 1 Tablet(s) Oral every 4 hours PRN Moderate Pain (4 - 6)    Vital Signs Last 24 Hrs  T(C): 37.1 (05 Sep 2019 08:43), Max: 37.1 (05 Sep 2019 08:43)  T(F): 98.7 (05 Sep 2019 08:43), Max: 98.7 (05 Sep 2019 08:43)  HR: 88 (05 Sep 2019 10:00) (82 - 131)  BP: 116/40 (05 Sep 2019 10:00) (97/57 - 149/41)  BP(mean): 60 (05 Sep 2019 10:00) (60 - 74)  RR: 19 (05 Sep 2019 10:00) (14 - 29)  SpO2: 99% (05 Sep 2019 10:00) (84% - 100%)    09-04 @ 07:01  -  09-05 @ 07:00  --------------------------------------------------------  IN: 210 mL / OUT: 0 mL / NET: 210 mL    PHYSICAL EXAM:  Alert but tired.  Slept poorly last night.  GENERAL: No distress  CHEST/LUNG: Fair air entry  HEART: S1S2 RRR  ABDOMEN: soft  EXTREMITIES: decreased edema  SKIN:     LABS:                        8.8    0.33  )-----------( 28       ( 05 Sep 2019 06:27 )             28.4     09-05    140  |  101  |  33<H>  ----------------------------<  122<H>  3.9   |  27  |  2.09<H>    Ca    7.9<L>      05 Sep 2019 06:27      PT/INR - ( 05 Sep 2019 06:27 )   PT: 24.7 sec;   INR: 2.17 ratio                     RADIOLOGY & ADDITIONAL TESTS:
NEPHROLOGY INTERVAL HPI/OVERNIGHT EVENTS:    Date of Service: 09-10-19 @ 14:42  9/10 SY  Medicated for pain.  Awake but with decreased response.  No apparent distress.  Pain better controlled.     SY  Medicated with Dilaudid prior to HD today due to increased pain.  Pt awake and responsive but with limited response.  Family at bedside.    19 @ 14:51  pain better  percocet and dilauded stopped for inc lethargy today       feels well  seen on hd  platelets ordered by dr Masood leonard  daughter at bedside     SY  Tired.  Slept poorly last night.  Complains of back pain.  Eating a little better this am.     SY  No acute events overnight.  No apparent distress.  No new complaints.    HPI:  84 y.o. Male with PMHx of ESRD on HD (MWF) at [facility] , HTN, RA on MTX and steroids, R AKA, Hx of severe CDI and megacolon, Severe PAD s/p LLE endarterectomy complicated by left groin infections s/p Tx and muscle flap, recent Dx of NSCLC not treated yet and planned for XRT this month sent from SNF due to fever up to 104F and black tarry stools this am as well as dry blood/clots in mouth noted by daughter.   Pt was lethargic day prior, c/o lower back pain. Found to have coagulopathy with INR>8, guaiac +, severe neutropenia, RUL/RLL infiltrates, increased in size RML mass. In ED pt was febrile, tachycardic and hypotensive SBP 70s' and required IVF bolus.  Repeat Hb < 7 now - will need 2 Units PRBC    At the time of evaluation pt is more alert, shivering, c/o lower back pain with slightly improved BP   daughter at bedside  pt denies sob at this time     MEDICATIONS  (STANDING):  acetaminophen   Tablet .. 650 milliGRAM(s) Oral every 12 hours  diltiazem    Tablet 60 milliGRAM(s) Oral every 6 hours  epoetin nelly Injectable 17719 Unit(s) IV Push <User Schedule>  fentaNYL   Patch  12 MICROgram(s)/Hr 1 Patch Transdermal every 72 hours  finasteride 5 milliGRAM(s) Oral daily  lidocaine   Patch 1 Patch Transdermal daily    MEDICATIONS  (PRN):  acetaminophen   Tablet .. 650 milliGRAM(s) Oral every 6 hours PRN Temp greater or equal to 38C (100.4F), Mild Pain (1 - 3)  ALBUTerol    90 MICROgram(s) HFA Inhaler 2 Puff(s) Inhalation every 6 hours PRN Shortness of Breath and/or Wheezing  HYDROmorphone  Injectable 0.2 milliGRAM(s) IV Push every 3 hours PRN moderate pain or dyspnea  HYDROmorphone  Injectable 0.5 milliGRAM(s) IV Push every 3 hours PRN severe pain or dyspnea  LORazepam   Injectable 0.25 milliGRAM(s) IV Push every 4 hours PRN anxiety or agitation    Vital Signs Last 24 Hrs  T(C): 37.1 (10 Sep 2019 05:09), Max: 37.1 (10 Sep 2019 05:09)  T(F): 98.8 (10 Sep 2019 05:09), Max: 98.8 (10 Sep 2019 05:09)  HR: 84 (10 Sep 2019 05:09) (84 - 96)  BP: 113/37 (10 Sep 2019 05:09) (107/34 - 115/30)  BP(mean): --  RR: 18 (10 Sep 2019 05:09) (18 - 18)  SpO2: 94% (10 Sep 2019 05:09) (94% - 94%)  Daily     Daily Weight in k.5 (10 Sep 2019 05:09)      PHYSICAL EXAM:  Awake.  No acute distress  GENERAL: No distress  CHEST/LUNG: Fair air entry  HEART: S1S2 RRR  ABDOMEN: soft  EXTREMITIES: no edema  SKIN:     LABS:                        9.0    1.88  )-----------( 34       ( 09 Sep 2019 10:10 )             28.8         134<L>  |  98  |  75<H>  ----------------------------<  108<H>  4.7   |  27  |  4.19<H>    Ca    8.6      09 Sep 2019 10:10  Phos  3.6         TPro  x   /  Alb  1.7<L>  /  TBili  x   /  DBili  x   /  AST  x   /  ALT  x   /  AlkPhos  x       PT/INR - ( 10 Sep 2019 07:23 )   PT: 47.2 sec;   INR: 4.07 ratio                     RADIOLOGY & ADDITIONAL TESTS:
NEPHROLOGY INTERVAL HPI/OVERNIGHT EVENTS:    Date of Service: 19 @ 14:42  9/ SY  Medicated with Dilaudid prior to HD today due to increased pain.  Pt awake and responsive but with limited response.  Family at bedside.    19 @ 14:51  pain better  percocet and dilauded stopped for inc lethargy today       feels well  seen on hd  platelets ordered by dr Masood leonard  daughter at bedside    9 SY  Tired.  Slept poorly last night.  Complains of back pain.  Eating a little better this am.    94 SY  No acute events overnight.  No apparent distress.  No new complaints.    HPI:  84 y.o. Male with PMHx of ESRD on HD (MWF) at [facility] , HTN, RA on MTX and steroids, R AKA, Hx of severe CDI and megacolon, Severe PAD s/p LLE endarterectomy complicated by left groin infections s/p Tx and muscle flap, recent Dx of NSCLC not treated yet and planned for XRT this month sent from SNF due to fever up to 104F and black tarry stools this am as well as dry blood/clots in mouth noted by daughter.   Pt was lethargic day prior, c/o lower back pain. Found to have coagulopathy with INR>8, guaiac +, severe neutropenia, RUL/RLL infiltrates, increased in size RML mass. In ED pt was febrile, tachycardic and hypotensive SBP 70s' and required IVF bolus.  Repeat Hb < 7 now - will need 2 Units PRBC    At the time of evaluation pt is more alert, shivering, c/o lower back pain with slightly improved BP   daughter at bedside  pt denies sob at this time     MEDICATIONS  (STANDING):  acetaminophen   Tablet .. 650 milliGRAM(s) Oral every 12 hours  buDESOnide    Inhalation Suspension 0.5 milliGRAM(s) Inhalation two times a day  cefepime  Injectable. 1000 milliGRAM(s) IV Push daily  diltiazem    Tablet 60 milliGRAM(s) Oral every 6 hours  epoetin nelly Injectable 67818 Unit(s) IV Push <User Schedule>  fentaNYL   Patch  12 MICROgram(s)/Hr 1 Patch Transdermal every 72 hours  filgrastim-sndz Injectable 480 MICROGram(s) SubCutaneous daily  finasteride 5 milliGRAM(s) Oral daily  fluticasone propionate 50 MICROgram(s)/spray Nasal Spray 1 Spray(s) Both Nostrils daily  leucovorin 20 milliGRAM(s) Oral every 8 hours  lidocaine   Patch 1 Patch Transdermal daily  nystatin    Suspension 700201 Unit(s) Swish and Swallow every 6 hours  pantoprazole  Injectable 40 milliGRAM(s) IV Push two times a day  vancomycin    Solution 125 milliGRAM(s) Oral every 6 hours    MEDICATIONS  (PRN):  acetaminophen   Tablet .. 650 milliGRAM(s) Oral every 6 hours PRN Temp greater or equal to 38C (100.4F), Mild Pain (1 - 3)  ALBUTerol    90 MICROgram(s) HFA Inhaler 2 Puff(s) Inhalation every 6 hours PRN Shortness of Breath and/or Wheezing  HYDROmorphone  Injectable 0.5 milliGRAM(s) IV Push every 4 hours PRN brekathrough pain or dyspnea    Vital Signs Last 24 Hrs  T(C): 36.9 (09 Sep 2019 14:13), Max: 37.1 (09 Sep 2019 10:10)  T(F): 98.4 (09 Sep 2019 14:13), Max: 98.8 (09 Sep 2019 10:10)  HR: 104 (09 Sep 2019 14:13) (69 - 104)  BP: 114/31 (09 Sep 2019 14:13) (98/67 - 140/31)  BP(mean): --  RR: 17 (09 Sep 2019 14:13) (17 - 20)  SpO2: 94% (09 Sep 2019 14:13) (94% - 97%)  Daily     Daily Weight in k.1 (09 Sep 2019 05:13)    PHYSICAL EXAM:  Awake,  No acute distress  GENERAL: No acute distress  CHEST/LUNG: Scattered rhonchi  HEART: S1S2 RRR  ABDOMEN: soft  EXTREMITIES: decreased edema  SKIN:     LABS:                        9.0    1.88  )-----------( 34       ( 09 Sep 2019 10:10 )             28.8         134<L>  |  98  |  75<H>  ----------------------------<  108<H>  4.7   |  27  |  4.19<H>    Ca    8.6      09 Sep 2019 10:10  Phos  3.6         TPro  x   /  Alb  1.7<L>  /  TBili  x   /  DBili  x   /  AST  x   /  ALT  x   /  AlkPhos  x       PT/INR - ( 09 Sep 2019 10:10 )   PT: 44.7 sec;   INR: 3.86 ratio             Phosphorus Level, Serum: 3.6 mg/dL ( @ 10:10)          RADIOLOGY & ADDITIONAL TESTS:
NEPHROLOGY INTERVAL HPI/OVERNIGHT EVENTS:  09-11-19 @ 10:03  family has made decision for no further HD       MEDICATIONS  (STANDING):  acetaminophen   Tablet .. 650 milliGRAM(s) Oral every 12 hours  diltiazem    Tablet 60 milliGRAM(s) Oral every 6 hours  epoetin nelly Injectable 18948 Unit(s) IV Push <User Schedule>  fentaNYL   Patch  12 MICROgram(s)/Hr 1 Patch Transdermal every 72 hours  finasteride 5 milliGRAM(s) Oral daily  fluconAZOLE IVPB 100 milliGRAM(s) IV Intermittent every 48 hours  lidocaine   Patch 1 Patch Transdermal daily    MEDICATIONS  (PRN):  acetaminophen   Tablet .. 650 milliGRAM(s) Oral every 6 hours PRN Temp greater or equal to 38C (100.4F), Mild Pain (1 - 3)  ALBUTerol    90 MICROgram(s) HFA Inhaler 2 Puff(s) Inhalation every 6 hours PRN Shortness of Breath and/or Wheezing  HYDROmorphone  Injectable 0.2 milliGRAM(s) IV Push every 3 hours PRN moderate pain or dyspnea  HYDROmorphone  Injectable 0.5 milliGRAM(s) IV Push every 3 hours PRN severe pain or dyspnea  LORazepam   Injectable 0.25 milliGRAM(s) IV Push every 4 hours PRN anxiety or agitation      Allergies    Zosyn (Rash)    Intolerances        I&O's Detail      .      Vital Signs Last 24 Hrs  T(C): --  T(F): --  HR: --  BP: 139/20 (10 Sep 2019 18:01) (139/20 - 139/20)  BP(mean): --  RR: --  SpO2: --  Daily     Daily     PHYSICAL EXAM:  resting comfortably    LABS:                        9.0    1.88  )-----------( 34       ( 09 Sep 2019 10:10 )             28.8     09-09    134<L>  |  98  |  75<H>  ----------------------------<  108<H>  4.7   |  27  |  4.19<H>    Ca    8.6      09 Sep 2019 10:10  Phos  3.6     09-09    TPro  x   /  Alb  1.7<L>  /  TBili  x   /  DBili  x   /  AST  x   /  ALT  x   /  AlkPhos  x   09-09    PT/INR - ( 10 Sep 2019 07:23 )   PT: 47.2 sec;   INR: 4.07 ratio
NEPHROLOGY INTERVAL HPI/OVERNIGHT EVENTS:  19 @ 14:51  pain better  percocet and dilauded stopped for inc lethargy today     MEDICATIONS  (STANDING):  acetaminophen   Tablet .. 650 milliGRAM(s) Oral every 12 hours  buDESOnide    Inhalation Suspension 0.5 milliGRAM(s) Inhalation two times a day  cefepime  Injectable. 1000 milliGRAM(s) IV Push daily  diltiazem    Tablet 60 milliGRAM(s) Oral every 6 hours  epoetin nelly Injectable 15645 Unit(s) IV Push <User Schedule>  fentaNYL   Patch  12 MICROgram(s)/Hr 1 Patch Transdermal every 72 hours  filgrastim-sndz Injectable 480 MICROGram(s) SubCutaneous daily  finasteride 5 milliGRAM(s) Oral daily  fluticasone propionate 50 MICROgram(s)/spray Nasal Spray 1 Spray(s) Both Nostrils daily  lidocaine   Patch 1 Patch Transdermal daily  nystatin    Suspension 016348 Unit(s) Swish and Swallow every 6 hours  pantoprazole  Injectable 40 milliGRAM(s) IV Push two times a day  vancomycin    Solution 125 milliGRAM(s) Oral every 6 hours    MEDICATIONS  (PRN):  acetaminophen   Tablet .. 650 milliGRAM(s) Oral every 6 hours PRN Temp greater or equal to 38C (100.4F), Mild Pain (1 - 3)  ALBUTerol    90 MICROgram(s) HFA Inhaler 2 Puff(s) Inhalation every 6 hours PRN Shortness of Breath and/or Wheezing      Allergies    Zosyn (Rash)    Intolerances        I&O's Detail      .      Vital Signs Last 24 Hrs  T(C): 36.7 (08 Sep 2019 11:43), Max: 37.3 (07 Sep 2019 17:57)  T(F): 98 (08 Sep 2019 11:43), Max: 99.2 (07 Sep 2019 17:57)  HR: 83 (08 Sep 2019 11:43) (82 - 92)  BP: 127/59 (08 Sep 2019 11:43) (106/40 - 132/56)  BP(mean): 73 (07 Sep 2019 16:00) (68 - 73)  RR: 20 (08 Sep 2019 11:43) (19 - 21)  SpO2: 95% (08 Sep 2019 11:43) (95% - 100%)  Daily     Daily Weight in k.4 (08 Sep 2019 04:28)    PHYSICAL EXAM:  General: alert. awake   HEENT: MMM  CV: s1s2 rrr  LUNGS: B/L CTA  EXT: no edema    LABS:                        9.2    0.58  )-----------( 25       ( 08 Sep 2019 06:40 )             29.1     09-08    140  |  100  |  58<H>  ----------------------------<  110<H>  4.5   |  29  |  3.19<H>    Ca    8.4<L>      08 Sep 2019 06:40      PT/INR - ( 08 Sep 2019 06:40 )   PT: 31.4 sec;   INR: 2.74 ratio
PAtient is an 84 year old male who presented from rehab with fevers to 104 and blood in his stool with hypotension.  In the ED he was given 1 L NS and his BP improved.  He had guaiac positive brown stool as well as a K+ of 6.1.  BP improves with 1 L NS and had a normal Lactate.           PAST MEDICAL & SURGICAL HISTORY:  Above knee amputation of right lower extremity  Recurrent Clostridium difficile diarrhea  Diastolic CHF  Peripheral vascular disease  Afib  Anemia  CKD (chronic kidney disease)  COPD (chronic obstructive pulmonary disease)  SHAYY (obstructive sleep apnea)  Sepsis, due to unspecified organism: 2/2 poorly healing wounds b/l  Dyspepsia: On moderate exertion.  Sleep apnea, obstructive: Requires home 02 therapy, and treatment with BIPAP  Atelectasis  Pleural effusion, bilateral  Respiratory failure  Peripheral edema  CRI (chronic renal insufficiency)  Gout  Benign prostatic hypertrophy  Spinal stenosis  Hypercholesterolemia  GERD (gastroesophageal reflux disease)  CAD (coronary artery disease)  Hypertension  S/P angioplasty with stent  Cataract of left eye  Prostate: Surgery green light procedure.  S/P rotator cuff surgery: Right  S/P angioplasty      FAMILY HISTORY:  Family history of colorectal cancer  Family history of diabetes mellitus (Sibling)  Family history of hypertension      Social Hx:    Allergies    Zosyn (Rash)    Intolerances        Height (cm): 170.18 ( @ 11:05)  Weight (kg): 74.4 ( @ 11:05)  BMI (kg/m2): 25.7 ( @ 11:05)    ICU Vital Signs Last 24 Hrs  T(C): 39 (01 Sep 2019 11:31), Max: 39 (01 Sep 2019 11:31)  T(F): 102.2 (01 Sep 2019 11:31), Max: 102.2 (01 Sep 2019 11:31)  HR: 101 (01 Sep 2019 14:29) (101 - 124)  BP: 108/38 (01 Sep 2019 14:29) (72/16 - 108/38)  BP(mean): --  ABP: --  ABP(mean): --  RR: 20 (01 Sep 2019 14:29) (18 - 24)  SpO2: 90% (01 Sep 2019 12:35) (90% - 100%)          I&O's Summary    01 Sep 2019 07:01  -  01 Sep 2019 14:31  --------------------------------------------------------  IN: 1300 mL / OUT: 0 mL / NET: 1300 mL                              6.4    0.60  )-----------( 23       ( 01 Sep 2019 13:56 )             21.8           138  |  103  |  93<H>  ----------------------------<  75  6.1<H>   |  23  |  4.13<H>    Ca    7.9<L>      01 Sep 2019 11:17    TPro  5.3<L>  /  Alb  2.3<L>  /  TBili  0.4  /  DBili  x   /  AST  21  /  ALT  49  /  AlkPhos  74                  Urinalysis Basic - ( 01 Sep 2019 11:27 )    Color: Brown / Appearance: very cloudy / S.015 / pH: x  Gluc: x / Ketone: Trace  / Bili: Moderate / Urobili: Negative mg/dL   Blood: x / Protein: 100 mg/dL / Nitrite: Negative   Leuk Esterase: Moderate / RBC: 6-10 /HPF / WBC >50   Sq Epi: x / Non Sq Epi: Occasional / Bacteria: Few        MEDICATIONS  (STANDING):    MEDICATIONS  (PRN):      DVT Prophylaxis:     Advanced Directives:  Discussed with:    Visit Information: 30 min    ** Time is exclusive of billed procedures and/or teaching and/or routine family updates.
PCP- DR Larsen    CC-  fever, tarry stools     HPI:     84 y.o. Male with PMHx of ESRD on HD (MWF), HTN, RA on MTX and steroids, R AKA, Hx of severe CDI and megacolon, Severe PAD s/p LLE endarterectomy complicated by left groin infections s/p Tx and muscle flap, recent Dx of NSCLC not treated yet and planned for XRT this month sent from SNF due to fever up to 104F and black tarry stools this am as well as dry blood in mouth. Pt was lethargic day prior, c/o lower back pain. Found to have coagulopathy with INR>8, guaiac +, severe neutropenia, RUL/RLL infiltrates, increased in size RML mass. In ED pt was febrile, tachycardic and hypotensive, but rejected by intensivist for admission to MICU.  At the time of evaluation pt is more alert, shivering, c/o lower back pain with slightly improved BP.     9/2/19- seen during HD, hungry and asking for food. Getting PRBC transfusion with HD  9/3 - pt seen and examined, feels very weak, denies abdominal pain, dyspnea, palpitations, afebrile    Review of system- All 10 systems reviewed and is as per HPI otherwise negative.     T(C): 37.3 (09-03-19 @ 16:36), Max: 37.7 (09-03-19 @ 13:57)  T(F): 99.1 (09-03-19 @ 16:36), Max: 99.9 (09-03-19 @ 13:57)  HR: 99 (09-03-19 @ 17:00) (78 - 101)  BP: 143/47 (09-03-19 @ 17:00) (110/40 - 150/43)  RR: 22 (09-03-19 @ 17:00) (14 - 22)  SpO2: 94% (09-03-19 @ 17:00) (92% - 100%)  Wt(kg): --    PHYSICAL EXAM:  GENERAL: NAD, well-groomed, well-developed  HEAD:  Atraumatic, Normocephalic  EYES: EOMI, PERRLA, conjunctiva and sclera clear  HEENT: Moist mucous membranes  NECK: Supple, No JVD  NERVOUS SYSTEM:  Alert & Oriented X3, Motor Strength 5/5 B/L upper and lower extremities; DTRs 2+ intact and symmetric  CHEST/LUNG: occasional rhonchi  HEART: Regular rate and rhythm; No murmurs, rubs, or gallops  ABDOMEN: Soft, Nontender, Nondistended; Bowel sounds present  GENITOURINARY- no palpable bladder  EXTREMITIES:  RT AKA  MUSCULOSKELETAL No muscle tenderness, Muscle tone normal, No joint tenderness, no Joint swelling, Joint range of motion-normal  SKIN- multiple skin hematomas  CNS- alert, oriented X3, non focal     09-03    143  |  107  |  46<H>  ----------------------------<  75  3.6   |  25  |  2.26<H>    Ca    7.9<L>      03 Sep 2019 08:28  Phos  5.0     09-01  Mg     1.9     09-03    TPro  x   /  Alb  2.1<L>  /  TBili  x   /  DBili  x   /  AST  x   /  ALT  x   /  AlkPhos  x   09-01                            9.0    0.30  )-----------( 15       ( 03 Sep 2019 08:28 )             28.9       LIVER FUNCTIONS - ( 01 Sep 2019 18:10 )  Alb: 2.1 g/dL / Pro: x     / ALK PHOS: x     / ALT: x     / AST: x     / GGT: x             PT/INR - ( 03 Sep 2019 08:28 )   PT: 19.4 sec;   INR: 1.72 ratio        RADIOLOGY & ADDITIONAL TESTS:  EXAM:  CT ABDOMEN AND PELVIS                        EXAM:  CT CHEST                          *** ADDENDUM 09/01/2019  ***    This addendum is dated 9/1/2019 at 12:49 PM.    There is some edema in the right paracolic gutter and adjacent to the   mesentery of the ascending colon. There is no colonic wall thickening in   this region but the possibility of mild colitis involving the ascending   colon is considered.    Differential diagnosis for the appearance of the focal thickening of the   right lateral wall of the rectum is focal proctitis or tumor. Again   direct visualization is recommended when clinically feasible.    *** END OF ADDENDUM 09/01/2019  ***  PROCEDURE DATE:  09/01/2019      INTERPRETATION:  Clinical information: Sepsis and lung cancer, back pain   and GI bleed.    Comparison: PET/CT dated 6/4/2019    PROCEDURE:   CT of the Chest, abdomen and pelvis was performed without intravenous   contrast.  Oral contrast was not administered.      FINDINGS:    CHEST:  LUNG AND LARGE AIRWAYS: Interval development of opacities in the right   upper lobe posteriorly likely on the basis of pneumonia. The mass in the   right middle lobe measures 7.1 x 5.5 cm previously 6.4 x 4.5 cm. Interval   development of opacities in the right lower lobe, likely on the basis of   pneumonia. There is atelectasis involving the left lower lobe medially.   There is a small amount of atelectasis involving the lingula.  PLEURA: within normal limits.  VESSELS: Atherosclerotic changes in the aorta and coronary arteries  HEART: normal size. No pericardial effusion.  MEDIASTINUM AND [place]: within normal limits.  CHEST WALL AND LOWER NECK: There is a new subcutaneous nodule in the left   anterior chest wall measuring 1.1 cm. A medially located left anterior   chest wall nodule measuring 8 mm is stable. A 4.6 cm left lobe thyroid   nodule is stable.    ABDOMEN:  LIVER: Calcified granuloma.  BILE DUCTS: normal caliber.  GALLBLADDER: Distended containing a gallstone.  PANCREAS: within normal limits.  SPLEEN: within normal limits.  ADRENALS: within normal limits.  KIDNEYS: Bilateral renal hypodensities measuring up to 4.3 cm on the   right. A 1.4 cm hyperattenuating lesion in the upper pole of the right   kidney is again noted and may represent hemorrhagic cyst.    PELVIS:  REPRODUCTIVE ORGANS: no pelvic masses.  URETERS: within normal limits.  BLADDER: within normal limits.    BOWEL: There is a metallic clip again noted within the gastric fundus.   The colon is distended containing stool and air. There is asymmetric wall   thickening of the right side of the rectum.  PERITONEUM: no ascites or free air, no fluid collection.  VESSELS: Atherosclerotic changes. There is an IVC filter.  RETROPERITONEUM: No enlarged retroperitoneal or pelvic nodes.  ABDOMINAL WALL: There are postsurgical changes in both inguinal regions   again noted, left greater than right.  BONES: There is a mild to moderate anterior wedging deformity of the T12   vertebral body.    IMPRESSION: Increase in size of the right middle lobe lung mass.    New opacities in the right upper lobe and right lower lobe likely on the   basis of pneumonia. Tumor infiltration not excluded.    Areas of atelectasis involving the left lower lobe and lingula now noted.    2 subcutaneous nodules left anterior chest wall one of which is new and   for which metastatic disease is in the differential diagnosis.    Distended colon containing stool and air.    Asymmetric wall thickening right lateral rectum. This could represent   focal proctitis, however, direct visualization is suggested when   clinically feasible.    ***Please see the addendum at the top of this report. It may contain   additional important information or changes.****    MEDICATIONS  (STANDING):  buDESOnide    Inhalation Suspension 0.5 milliGRAM(s) Inhalation two times a day  cefepime  Injectable. 1000 milliGRAM(s) IV Push daily  diltiazem    Tablet 30 milliGRAM(s) Oral every 6 hours  epoetin nelly Injectable 99673 Unit(s) IV Push <User Schedule>  filgrastim-sndz Injectable 480 MICROGram(s) SubCutaneous daily  finasteride 5 milliGRAM(s) Oral daily  fluticasone propionate 50 MICROgram(s)/spray Nasal Spray 1 Spray(s) Both Nostrils daily  pantoprazole  Injectable 40 milliGRAM(s) IV Push two times a day  vancomycin    Solution 125 milliGRAM(s) Oral every 6 hours    MEDICATIONS  (PRN):  acetaminophen   Tablet .. 650 milliGRAM(s) Oral every 6 hours PRN Temp greater or equal to 38C (100.4F), Mild Pain (1 - 3)  ALBUTerol    90 MICROgram(s) HFA Inhaler 2 Puff(s) Inhalation every 6 hours PRN Shortness of Breath and/or Wheezing  methocarbamol 500 milliGRAM(s) Oral every 8 hours PRN mild pain  oxyCODONE    5 mG/acetaminophen 325 mG 1 Tablet(s) Oral every 4 hours PRN Moderate Pain (4 - 6)
PCP- DR Larsen    CC-  fever, tarry stools     HPI:     84 y.o. Male with PMHx of ESRD on HD (MWF), HTN, RA on MTX and steroids, R AKA, Hx of severe CDI and megacolon, Severe PAD s/p LLE endarterectomy complicated by left groin infections s/p Tx and muscle flap, recent Dx of NSCLC not treated yet and planned for XRT this month sent from SNF due to fever up to 104F and black tarry stools this am as well as dry blood in mouth. Pt was lethargic day prior, c/o lower back pain. Found to have coagulopathy with INR>8, guaiac +, severe neutropenia, RUL/RLL infiltrates, increased in size RML mass. In ED pt was febrile, tachycardic and hypotensive, but rejected by intensivist for admission to MICU.  At the time of evaluation pt is more alert, shivering, c/o lower back pain with slightly improved BP.     9/2/19- seen during HD, hungry and asking for food. Getting PRBC transfusion with HD  9/3 - pt seen and examined, feels very weak, denies abdominal pain, dyspnea, palpitations, afebrile  9/4 - pt seen and examined, pt reports pain on chewing in the mouth, + weakness, tolerates HD, afebrile    Review of system- All 10 systems reviewed and is as per HPI otherwise negative.     T(C): 36.7 (09-04-19 @ 16:31), Max: 38 (09-03-19 @ 20:41)  T(F): 98.1 (09-04-19 @ 16:31), Max: 100.4 (09-03-19 @ 20:41)  HR: 107 (09-04-19 @ 17:00) (90 - 131)  BP: 117/46 (09-04-19 @ 17:00) (96/58 - 149/45)  RR: 20 (09-04-19 @ 17:00) (16 - 29)  SpO2: 90% (09-04-19 @ 16:16) (84% - 100%)  Wt(kg): --      PHYSICAL EXAM:  GENERAL: NAD, well-groomed, well-developed  HEAD:  Atraumatic, Normocephalic  EYES: EOMI, PERRLA, conjunctiva and sclera clear  HEENT: Moist mucous membranes  NECK: Supple, No JVD  NERVOUS SYSTEM:  Alert & Oriented X3, Motor Strength 5/5 B/L upper and lower extremities; DTRs 2+ intact and symmetric  CHEST/LUNG: occasional rhonchi  HEART: Regular rate and rhythm; No murmurs, rubs, or gallops  ABDOMEN: Soft, Nontender, Nondistended; Bowel sounds present  GENITOURINARY- no palpable bladder  EXTREMITIES:  RT AKA  MUSCULOSKELETAL No muscle tenderness, Muscle tone normal, No joint tenderness, no Joint swelling, Joint range of motion-normal  SKIN- multiple skin hematomas  CNS- alert, oriented X3, non focal     09-04    139  |  104  |  72<H>  ----------------------------<  57<L>  3.9   |  21<L>  |  3.22<H>    Ca    7.7<L>      04 Sep 2019 06:39  Mg     1.9     09-03                          8.6    0.29  )-----------( 11       ( 04 Sep 2019 06:39 )             27.6     PT/INR - ( 04 Sep 2019 06:39 )   PT: 30.0 sec;   INR: 2.62 ratio            09-03    143  |  107  |  46<H>  ----------------------------<  75  3.6   |  25  |  2.26<H>    Ca    7.9<L>      03 Sep 2019 08:28  Phos  5.0     09-01  Mg     1.9     09-03    TPro  x   /  Alb  2.1<L>  /  TBili  x   /  DBili  x   /  AST  x   /  ALT  x   /  AlkPhos  x   09-01                            9.0    0.30  )-----------( 15       ( 03 Sep 2019 08:28 )             28.9       LIVER FUNCTIONS - ( 01 Sep 2019 18:10 )  Alb: 2.1 g/dL / Pro: x     / ALK PHOS: x     / ALT: x     / AST: x     / GGT: x             PT/INR - ( 03 Sep 2019 08:28 )   PT: 19.4 sec;   INR: 1.72 ratio        RADIOLOGY & ADDITIONAL TESTS:  EXAM:  CT ABDOMEN AND PELVIS                        EXAM:  CT CHEST                          *** ADDENDUM 09/01/2019  ***    This addendum is dated 9/1/2019 at 12:49 PM.    There is some edema in the right paracolic gutter and adjacent to the   mesentery of the ascending colon. There is no colonic wall thickening in   this region but the possibility of mild colitis involving the ascending   colon is considered.    Differential diagnosis for the appearance of the focal thickening of the   right lateral wall of the rectum is focal proctitis or tumor. Again   direct visualization is recommended when clinically feasible.    *** END OF ADDENDUM 09/01/2019  ***  PROCEDURE DATE:  09/01/2019      INTERPRETATION:  Clinical information: Sepsis and lung cancer, back pain   and GI bleed.    Comparison: PET/CT dated 6/4/2019    PROCEDURE:   CT of the Chest, abdomen and pelvis was performed without intravenous   contrast.  Oral contrast was not administered.      FINDINGS:    CHEST:  LUNG AND LARGE AIRWAYS: Interval development of opacities in the right   upper lobe posteriorly likely on the basis of pneumonia. The mass in the   right middle lobe measures 7.1 x 5.5 cm previously 6.4 x 4.5 cm. Interval   development of opacities in the right lower lobe, likely on the basis of   pneumonia. There is atelectasis involving the left lower lobe medially.   There is a small amount of atelectasis involving the lingula.  PLEURA: within normal limits.  VESSELS: Atherosclerotic changes in the aorta and coronary arteries  HEART: normal size. No pericardial effusion.  MEDIASTINUM AND [place]: within normal limits.  CHEST WALL AND LOWER NECK: There is a new subcutaneous nodule in the left   anterior chest wall measuring 1.1 cm. A medially located left anterior   chest wall nodule measuring 8 mm is stable. A 4.6 cm left lobe thyroid   nodule is stable.    ABDOMEN:  LIVER: Calcified granuloma.  BILE DUCTS: normal caliber.  GALLBLADDER: Distended containing a gallstone.  PANCREAS: within normal limits.  SPLEEN: within normal limits.  ADRENALS: within normal limits.  KIDNEYS: Bilateral renal hypodensities measuring up to 4.3 cm on the   right. A 1.4 cm hyperattenuating lesion in the upper pole of the right   kidney is again noted and may represent hemorrhagic cyst.    PELVIS:  REPRODUCTIVE ORGANS: no pelvic masses.  URETERS: within normal limits.  BLADDER: within normal limits.    BOWEL: There is a metallic clip again noted within the gastric fundus.   The colon is distended containing stool and air. There is asymmetric wall   thickening of the right side of the rectum.  PERITONEUM: no ascites or free air, no fluid collection.  VESSELS: Atherosclerotic changes. There is an IVC filter.  RETROPERITONEUM: No enlarged retroperitoneal or pelvic nodes.  ABDOMINAL WALL: There are postsurgical changes in both inguinal regions   again noted, left greater than right.  BONES: There is a mild to moderate anterior wedging deformity of the T12   vertebral body.    IMPRESSION: Increase in size of the right middle lobe lung mass.    New opacities in the right upper lobe and right lower lobe likely on the   basis of pneumonia. Tumor infiltration not excluded.    Areas of atelectasis involving the left lower lobe and lingula now noted.    2 subcutaneous nodules left anterior chest wall one of which is new and   for which metastatic disease is in the differential diagnosis.    Distended colon containing stool and air.    Asymmetric wall thickening right lateral rectum. This could represent   focal proctitis, however, direct visualization is suggested when   clinically feasible.    ***Please see the addendum at the top of this report. It may contain   additional important information or changes.****    MEDICATIONS  (STANDING):  buDESOnide    Inhalation Suspension 0.5 milliGRAM(s) Inhalation two times a day  cefepime  Injectable. 1000 milliGRAM(s) IV Push daily  diltiazem    Tablet 30 milliGRAM(s) Oral every 6 hours  epoetin nelly Injectable 22483 Unit(s) IV Push <User Schedule>  filgrastim-sndz Injectable 480 MICROGram(s) SubCutaneous daily  finasteride 5 milliGRAM(s) Oral daily  fluticasone propionate 50 MICROgram(s)/spray Nasal Spray 1 Spray(s) Both Nostrils daily  pantoprazole  Injectable 40 milliGRAM(s) IV Push two times a day  vancomycin    Solution 125 milliGRAM(s) Oral every 6 hours    MEDICATIONS  (PRN):  acetaminophen   Tablet .. 650 milliGRAM(s) Oral every 6 hours PRN Temp greater or equal to 38C (100.4F), Mild Pain (1 - 3)  ALBUTerol    90 MICROgram(s) HFA Inhaler 2 Puff(s) Inhalation every 6 hours PRN Shortness of Breath and/or Wheezing  methocarbamol 500 milliGRAM(s) Oral every 8 hours PRN mild pain  oxyCODONE    5 mG/acetaminophen 325 mG 1 Tablet(s) Oral every 4 hours PRN Moderate Pain (4 - 6)
PCP- DR Larsen    CC-  fever, tarry stools     HPI:     84 y.o. Male with PMHx of ESRD on HD (MWF), HTN, RA on MTX and steroids, R AKA, Hx of severe CDI and megacolon, Severe PAD s/p LLE endarterectomy complicated by left groin infections s/p Tx and muscle flap, recent Dx of NSCLC not treated yet and planned for XRT this month sent from SNF due to fever up to 104F and black tarry stools this am as well as dry blood in mouth. Pt was lethargic day prior, c/o lower back pain. Found to have coagulopathy with INR>8, guaiac +, severe neutropenia, RUL/RLL infiltrates, increased in size RML mass. In ED pt was febrile, tachycardic and hypotensive, but rejected by intensivist for admission to MICU.  At the time of evaluation pt is more alert, shivering, c/o lower back pain with slightly improved BP.     9/2/19- seen during HD, hungry and asking for food. Getting PRBC transfusion with HD  9/3 - pt seen and examined, feels very weak, denies abdominal pain, dyspnea, palpitations, afebrile  9/4 - pt seen and examined, pt reports pain on chewing in the mouth, + weakness, tolerates HD, afebrile  9/5- pt seen and examined, lethargic, reports right shoulder pain , h/o prior surgery many years ago, afebrile,  mouth pain improved, denies cp, palpitations, abdominal pain     Review of system- All 10 systems reviewed and is as per HPI otherwise negative.     T(C): 36.7 (09-05-19 @ 17:00), Max: 37.1 (09-05-19 @ 08:43)  T(F): 98 (09-05-19 @ 17:00), Max: 98.7 (09-05-19 @ 08:43)  HR: 110 (09-05-19 @ 18:00) (82 - 117)  BP: 125/66 (09-05-19 @ 18:00) (97/57 - 140/48)  RR: 23 (09-05-19 @ 18:00) (14 - 25)  SpO2: 97% (09-05-19 @ 18:00) (94% - 100%)  Wt(kg): --      PHYSICAL EXAM:  GENERAL: NAD, well-groomed, well-developed  HEAD:  Atraumatic, Normocephalic  EYES: EOMI, PERRLA, conjunctiva and sclera clear  HEENT: Moist mucous membranes  NECK: Supple, No JVD  NERVOUS SYSTEM:  Alert & Oriented X3, Motor Strength 5/5 B/L upper and lower extremities; DTRs 2+ intact and symmetric  CHEST/LUNG: occasional rhonchi  HEART: Regular rate and rhythm; No murmurs, rubs, or gallops  ABDOMEN: Soft, Nontender, Nondistended; Bowel sounds present  GENITOURINARY- no palpable bladder  EXTREMITIES:  RT AKA  MUSCULOSKELETAL No muscle tenderness, Muscle tone normal, No joint tenderness, no Joint swelling, Joint range of motion-normal  SKIN- multiple skin hematomas  CNS- alert, oriented X3, non focal     09-05    140  |  101  |  33<H>  ----------------------------<  122<H>  3.9   |  27  |  2.09<H>    Ca    7.9<L>      05 Sep 2019 06:27                          8.8    0.33  )-----------( 28       ( 05 Sep 2019 06:27 )             28.4         PT/INR - ( 05 Sep 2019 06:27 )   PT: 24.7 sec;   INR: 2.17 ratio               09-04    139  |  104  |  72<H>  ----------------------------<  57<L>  3.9   |  21<L>  |  3.22<H>    Ca    7.7<L>      04 Sep 2019 06:39  Mg     1.9     09-03                          8.6    0.29  )-----------( 11       ( 04 Sep 2019 06:39 )             27.6     PT/INR - ( 04 Sep 2019 06:39 )   PT: 30.0 sec;   INR: 2.62 ratio            09-03    143  |  107  |  46<H>  ----------------------------<  75  3.6   |  25  |  2.26<H>    Ca    7.9<L>      03 Sep 2019 08:28  Phos  5.0     09-01  Mg     1.9     09-03    TPro  x   /  Alb  2.1<L>  /  TBili  x   /  DBili  x   /  AST  x   /  ALT  x   /  AlkPhos  x   09-01                            9.0    0.30  )-----------( 15       ( 03 Sep 2019 08:28 )             28.9       LIVER FUNCTIONS - ( 01 Sep 2019 18:10 )  Alb: 2.1 g/dL / Pro: x     / ALK PHOS: x     / ALT: x     / AST: x     / GGT: x             PT/INR - ( 03 Sep 2019 08:28 )   PT: 19.4 sec;   INR: 1.72 ratio        RADIOLOGY & ADDITIONAL TESTS:  EXAM:  CT ABDOMEN AND PELVIS                        EXAM:  CT CHEST                          *** ADDENDUM 09/01/2019  ***    This addendum is dated 9/1/2019 at 12:49 PM.    There is some edema in the right paracolic gutter and adjacent to the   mesentery of the ascending colon. There is no colonic wall thickening in   this region but the possibility of mild colitis involving the ascending   colon is considered.    Differential diagnosis for the appearance of the focal thickening of the   right lateral wall of the rectum is focal proctitis or tumor. Again   direct visualization is recommended when clinically feasible.    *** END OF ADDENDUM 09/01/2019  ***  PROCEDURE DATE:  09/01/2019      INTERPRETATION:  Clinical information: Sepsis and lung cancer, back pain   and GI bleed.    Comparison: PET/CT dated 6/4/2019    PROCEDURE:   CT of the Chest, abdomen and pelvis was performed without intravenous   contrast.  Oral contrast was not administered.      FINDINGS:    CHEST:  LUNG AND LARGE AIRWAYS: Interval development of opacities in the right   upper lobe posteriorly likely on the basis of pneumonia. The mass in the   right middle lobe measures 7.1 x 5.5 cm previously 6.4 x 4.5 cm. Interval   development of opacities in the right lower lobe, likely on the basis of   pneumonia. There is atelectasis involving the left lower lobe medially.   There is a small amount of atelectasis involving the lingula.  PLEURA: within normal limits.  VESSELS: Atherosclerotic changes in the aorta and coronary arteries  HEART: normal size. No pericardial effusion.  MEDIASTINUM AND [place]: within normal limits.  CHEST WALL AND LOWER NECK: There is a new subcutaneous nodule in the left   anterior chest wall measuring 1.1 cm. A medially located left anterior   chest wall nodule measuring 8 mm is stable. A 4.6 cm left lobe thyroid   nodule is stable.    ABDOMEN:  LIVER: Calcified granuloma.  BILE DUCTS: normal caliber.  GALLBLADDER: Distended containing a gallstone.  PANCREAS: within normal limits.  SPLEEN: within normal limits.  ADRENALS: within normal limits.  KIDNEYS: Bilateral renal hypodensities measuring up to 4.3 cm on the   right. A 1.4 cm hyperattenuating lesion in the upper pole of the right   kidney is again noted and may represent hemorrhagic cyst.    PELVIS:  REPRODUCTIVE ORGANS: no pelvic masses.  URETERS: within normal limits.  BLADDER: within normal limits.    BOWEL: There is a metallic clip again noted within the gastric fundus.   The colon is distended containing stool and air. There is asymmetric wall   thickening of the right side of the rectum.  PERITONEUM: no ascites or free air, no fluid collection.  VESSELS: Atherosclerotic changes. There is an IVC filter.  RETROPERITONEUM: No enlarged retroperitoneal or pelvic nodes.  ABDOMINAL WALL: There are postsurgical changes in both inguinal regions   again noted, left greater than right.  BONES: There is a mild to moderate anterior wedging deformity of the T12   vertebral body.    IMPRESSION: Increase in size of the right middle lobe lung mass.    New opacities in the right upper lobe and right lower lobe likely on the   basis of pneumonia. Tumor infiltration not excluded.    Areas of atelectasis involving the left lower lobe and lingula now noted.    2 subcutaneous nodules left anterior chest wall one of which is new and   for which metastatic disease is in the differential diagnosis.    Distended colon containing stool and air.    Asymmetric wall thickening right lateral rectum. This could represent   focal proctitis, however, direct visualization is suggested when   clinically feasible.    ***Please see the addendum at the top of this report. It may contain   additional important information or changes.****    MEDICATIONS  (STANDING):  buDESOnide    Inhalation Suspension 0.5 milliGRAM(s) Inhalation two times a day  cefepime  Injectable. 1000 milliGRAM(s) IV Push daily  diltiazem    Tablet 30 milliGRAM(s) Oral every 6 hours  epoetin nelly Injectable 75031 Unit(s) IV Push <User Schedule>  filgrastim-sndz Injectable 480 MICROGram(s) SubCutaneous daily  finasteride 5 milliGRAM(s) Oral daily  fluticasone propionate 50 MICROgram(s)/spray Nasal Spray 1 Spray(s) Both Nostrils daily  pantoprazole  Injectable 40 milliGRAM(s) IV Push two times a day  vancomycin    Solution 125 milliGRAM(s) Oral every 6 hours    MEDICATIONS  (PRN):  acetaminophen   Tablet .. 650 milliGRAM(s) Oral every 6 hours PRN Temp greater or equal to 38C (100.4F), Mild Pain (1 - 3)  ALBUTerol    90 MICROgram(s) HFA Inhaler 2 Puff(s) Inhalation every 6 hours PRN Shortness of Breath and/or Wheezing  methocarbamol 500 milliGRAM(s) Oral every 8 hours PRN mild pain  oxyCODONE    5 mG/acetaminophen 325 mG 1 Tablet(s) Oral every 4 hours PRN Moderate Pain (4 - 6)
PCP- DR Larsen    CC-  fever, tarry stools     HPI:     84 y.o. Male with PMHx of ESRD on HD (MWF), HTN, RA on MTX and steroids, R AKA, Hx of severe CDI and megacolon, Severe PAD s/p LLE endarterectomy complicated by left groin infections s/p Tx and muscle flap, recent Dx of NSCLC not treated yet and planned for XRT this month sent from SNF due to fever up to 104F and black tarry stools this am as well as dry blood in mouth. Pt was lethargic day prior, c/o lower back pain. Found to have coagulopathy with INR>8, guaiac +, severe neutropenia, RUL/RLL infiltrates, increased in size RML mass. In ED pt was febrile, tachycardic and hypotensive, but rejected by intensivist for admission to MICU.  At the time of evaluation pt is more alert, shivering, c/o lower back pain with slightly improved BP.     9/2/19- seen during HD, hungry and asking for food. Getting PRBC transfusion with HD  9/3 - pt seen and examined, feels very weak, denies abdominal pain, dyspnea, palpitations, afebrile  9/4 - pt seen and examined, pt reports pain on chewing in the mouth, + weakness, tolerates HD, afebrile  9/5- pt seen and examined, lethargic, reports right shoulder pain , h/o prior surgery many years ago, afebrile,  mouth pain improved, denies cp, palpitations, abdominal pain   9/6 - pt seen and examined, more awake, tolerates HD today, afebrile, denies cp, right shoulder pain better, no new complains, + gen weakness, + poor appetite    Review of system- All 10 systems reviewed and is as per HPI otherwise negative.     T(C): 38.2 (09-06-19 @ 16:40), Max: 38.2 (09-06-19 @ 16:40)  T(F): 100.8 (09-06-19 @ 16:40), Max: 100.8 (09-06-19 @ 16:40)  HR: 97 (09-06-19 @ 16:00) (83 - 108)  BP: 126/57 (09-06-19 @ 16:00) (106/51 - 140/71)  RR: 16 (09-06-19 @ 16:00) (16 - 23)  SpO2: 93% (09-06-19 @ 16:00) (91% - 100%)  Wt(kg): --    PHYSICAL EXAM:  GENERAL: NAD, well-groomed, well-developed  HEAD:  Atraumatic, Normocephalic  EYES: EOMI, PERRLA, conjunctiva and sclera clear  HEENT: Moist mucous membranes  NECK: Supple, No JVD  NERVOUS SYSTEM:  Alert & Oriented X3, Motor Strength 5/5 B/L upper and lower extremities; DTRs 2+ intact and symmetric  CHEST/LUNG: occasional rhonchi  HEART: Regular rate and rhythm; No murmurs, rubs, or gallops  ABDOMEN: Soft, Nontender, Nondistended; Bowel sounds present  GENITOURINARY- no palpable bladder  EXTREMITIES:  RT AKA  MUSCULOSKELETAL No muscle tenderness, Muscle tone normal, No joint tenderness, no Joint swelling, Joint range of motion-normal  SKIN- multiple skin hematomas  CNS- alert, oriented X3, non focal     09-06    137  |  99  |  54<H>  ----------------------------<  134<H>  4.0   |  28  |  3.13<H>    Ca    8.0<L>      06 Sep 2019 07:10  Phos  1.7     09-06    TPro  x   /  Alb  1.6<L>  /  TBili  x   /  DBili  x   /  AST  x   /  ALT  x   /  AlkPhos  x   09-06                          8.6    0.40  )-----------( 17       ( 06 Sep 2019 07:10 )             27.0     LIVER FUNCTIONS - ( 06 Sep 2019 07:10 )  Alb: 1.6 g/dL / Pro: x     / ALK PHOS: x     / ALT: x     / AST: x     / GGT: x             PT/INR - ( 06 Sep 2019 07:10 )   PT: 37.1 sec;   INR: 3.22 ratio        09-05    140  |  101  |  33<H>  ----------------------------<  122<H>  3.9   |  27  |  2.09<H>    Ca    7.9<L>      05 Sep 2019 06:27                          8.8    0.33  )-----------( 28       ( 05 Sep 2019 06:27 )             28.4         PT/INR - ( 05 Sep 2019 06:27 )   PT: 24.7 sec;   INR: 2.17 ratio               09-04    139  |  104  |  72<H>  ----------------------------<  57<L>  3.9   |  21<L>  |  3.22<H>    Ca    7.7<L>      04 Sep 2019 06:39  Mg     1.9     09-03                          8.6    0.29  )-----------( 11       ( 04 Sep 2019 06:39 )             27.6     PT/INR - ( 04 Sep 2019 06:39 )   PT: 30.0 sec;   INR: 2.62 ratio            09-03    143  |  107  |  46<H>  ----------------------------<  75  3.6   |  25  |  2.26<H>    Ca    7.9<L>      03 Sep 2019 08:28  Phos  5.0     09-01  Mg     1.9     09-03    TPro  x   /  Alb  2.1<L>  /  TBili  x   /  DBili  x   /  AST  x   /  ALT  x   /  AlkPhos  x   09-01                            9.0    0.30  )-----------( 15       ( 03 Sep 2019 08:28 )             28.9       LIVER FUNCTIONS - ( 01 Sep 2019 18:10 )  Alb: 2.1 g/dL / Pro: x     / ALK PHOS: x     / ALT: x     / AST: x     / GGT: x             PT/INR - ( 03 Sep 2019 08:28 )   PT: 19.4 sec;   INR: 1.72 ratio        RADIOLOGY & ADDITIONAL TESTS:    EXAM:  CT ABDOMEN AND PELVIS                        EXAM:  CT CHEST                          *** ADDENDUM 09/01/2019  ***    This addendum is dated 9/1/2019 at 12:49 PM.    There is some edema in the right paracolic gutter and adjacent to the   mesentery of the ascending colon. There is no colonic wall thickening in   this region but the possibility of mild colitis involving the ascending   colon is considered.    Differential diagnosis for the appearance of the focal thickening of the   right lateral wall of the rectum is focal proctitis or tumor. Again   direct visualization is recommended when clinically feasible.    *** END OF ADDENDUM 09/01/2019  ***  PROCEDURE DATE:  09/01/2019      INTERPRETATION:  Clinical information: Sepsis and lung cancer, back pain   and GI bleed.    Comparison: PET/CT dated 6/4/2019    PROCEDURE:   CT of the Chest, abdomen and pelvis was performed without intravenous   contrast.  Oral contrast was not administered.      FINDINGS:    CHEST:  LUNG AND LARGE AIRWAYS: Interval development of opacities in the right   upper lobe posteriorly likely on the basis of pneumonia. The mass in the   right middle lobe measures 7.1 x 5.5 cm previously 6.4 x 4.5 cm. Interval   development of opacities in the right lower lobe, likely on the basis of   pneumonia. There is atelectasis involving the left lower lobe medially.   There is a small amount of atelectasis involving the lingula.  PLEURA: within normal limits.  VESSELS: Atherosclerotic changes in the aorta and coronary arteries  HEART: normal size. No pericardial effusion.  MEDIASTINUM AND [place]: within normal limits.  CHEST WALL AND LOWER NECK: There is a new subcutaneous nodule in the left   anterior chest wall measuring 1.1 cm. A medially located left anterior   chest wall nodule measuring 8 mm is stable. A 4.6 cm left lobe thyroid   nodule is stable.    ABDOMEN:  LIVER: Calcified granuloma.  BILE DUCTS: normal caliber.  GALLBLADDER: Distended containing a gallstone.  PANCREAS: within normal limits.  SPLEEN: within normal limits.  ADRENALS: within normal limits.  KIDNEYS: Bilateral renal hypodensities measuring up to 4.3 cm on the   right. A 1.4 cm hyperattenuating lesion in the upper pole of the right   kidney is again noted and may represent hemorrhagic cyst.    PELVIS:  REPRODUCTIVE ORGANS: no pelvic masses.  URETERS: within normal limits.  BLADDER: within normal limits.    BOWEL: There is a metallic clip again noted within the gastric fundus.   The colon is distended containing stool and air. There is asymmetric wall   thickening of the right side of the rectum.  PERITONEUM: no ascites or free air, no fluid collection.  VESSELS: Atherosclerotic changes. There is an IVC filter.  RETROPERITONEUM: No enlarged retroperitoneal or pelvic nodes.  ABDOMINAL WALL: There are postsurgical changes in both inguinal regions   again noted, left greater than right.  BONES: There is a mild to moderate anterior wedging deformity of the T12   vertebral body.    IMPRESSION: Increase in size of the right middle lobe lung mass.    New opacities in the right upper lobe and right lower lobe likely on the   basis of pneumonia. Tumor infiltration not excluded.    Areas of atelectasis involving the left lower lobe and lingula now noted.    2 subcutaneous nodules left anterior chest wall one of which is new and   for which metastatic disease is in the differential diagnosis.    Distended colon containing stool and air.    Asymmetric wall thickening right lateral rectum. This could represent   focal proctitis, however, direct visualization is suggested when   clinically feasible.    ***Please see the addendum at the top of this report. It may contain   additional important information or changes.****    MEDICATIONS  (STANDING):  buDESOnide    Inhalation Suspension 0.5 milliGRAM(s) Inhalation two times a day  cefepime  Injectable. 1000 milliGRAM(s) IV Push daily  diltiazem    Tablet 30 milliGRAM(s) Oral every 6 hours  epoetin nelly Injectable 31686 Unit(s) IV Push <User Schedule>  filgrastim-sndz Injectable 480 MICROGram(s) SubCutaneous daily  finasteride 5 milliGRAM(s) Oral daily  fluticasone propionate 50 MICROgram(s)/spray Nasal Spray 1 Spray(s) Both Nostrils daily  pantoprazole  Injectable 40 milliGRAM(s) IV Push two times a day  vancomycin    Solution 125 milliGRAM(s) Oral every 6 hours    MEDICATIONS  (PRN):  acetaminophen   Tablet .. 650 milliGRAM(s) Oral every 6 hours PRN Temp greater or equal to 38C (100.4F), Mild Pain (1 - 3)  ALBUTerol    90 MICROgram(s) HFA Inhaler 2 Puff(s) Inhalation every 6 hours PRN Shortness of Breath and/or Wheezing  methocarbamol 500 milliGRAM(s) Oral every 8 hours PRN mild pain  oxyCODONE    5 mG/acetaminophen 325 mG 1 Tablet(s) Oral every 4 hours PRN Moderate Pain (4 - 6)
PCP- DR Larsen    CC-  fever, tarry stools (02 Sep 2019 12:24)    HPI:  84 y.o. Male with PMHx of ESRD on HD (MWF), HTN, RA on MTX and steroids, R AKA, Hx of severe CDI and megacolon, Severe PAD s/p LLE endarterectomy complicated by left groin infections s/p Tx and muscle flap, recent Dx of NSCLC not treated yet and planned for XRT this month sent from SNF due to fever up to 104F and black tarry stools this am as well as dry blood in mouth. Pt was lethargic day prior, c/o lower back pain. Found to have coagulopathy with INR>8, guaiac +, severe neutropenia, RUL/RLL infiltrates, increased in size RML mass. In ED pt was febrile, tachycardic and hypotensive, but rejected by intensivist for admission to MICU.  At the time of evaluation pt is more alert, shivering, c/o lower back pain with slightly improved BP. (01 Sep 2019 15:09)    19- seen during HD, hungry and asking for food. Getting PRBC transfusion with HD    Review of system- All 10 systems reviewed and is as per HPI otherwise negative.     T(C): 36.4 (19 @ 13:14), Max: 37.2 (19 @ 15:30)  HR: 86 (19 @ 14:14) (67 - 105)  BP: 145/46 (19 @ 14:14) (110/51 - 149/51)  RR: 22 (19 @ 14:14) (11 - 24)  SpO2: 100% (19 @ 14:09) (95% - 100%)  Wt(kg): --    LABS:                        7.8    0.34  )-----------( 15       ( 02 Sep 2019 03:48 )             25.6         142  |  105  |  59<H>  ----------------------------<  106<H>  4.8   |  30  |  2.61<H>    Ca    7.7<L>      02 Sep 2019 03:48  Phos  5.0         TPro  x   /  Alb  2.1<L>  /  TBili  x   /  DBili  x   /  AST  x   /  ALT  x   /  AlkPhos  x       PT/INR - ( 02 Sep 2019 03:48 )   PT: 16.1 sec;   INR: 1.43 ratio      PTT - ( 01 Sep 2019 11:17 )  PTT:43.8 sec    Urinalysis Basic - ( 01 Sep 2019 11:27 )  Color: Brown / Appearance: very cloudy / S.015 / pH: x  Gluc: x / Ketone: Trace  / Bili: Moderate / Urobili: Negative mg/dL   Blood: x / Protein: 100 mg/dL / Nitrite: Negative   Leuk Esterase: Moderate / RBC: 6-10 /HPF / WBC >50   Sq Epi: x / Non Sq Epi: Occasional / Bacteria: Few    RADIOLOGY & ADDITIONAL TESTS:  EXAM:  CT ABDOMEN AND PELVIS                        EXAM:  CT CHEST                          *** ADDENDUM 2019  ***    This addendum is dated 2019 at 12:49 PM.    There is some edema in the right paracolic gutter and adjacent to the   mesentery of the ascending colon. There is no colonic wall thickening in   this region but the possibility of mild colitis involving the ascending   colon is considered.    Differential diagnosis for the appearance of the focal thickening of the   right lateral wall of the rectum is focal proctitis or tumor. Again   direct visualization is recommended when clinically feasible.    *** END OF ADDENDUM 2019  ***  PROCEDURE DATE:  2019      INTERPRETATION:  Clinical information: Sepsis and lung cancer, back pain   and GI bleed.    Comparison: PET/CT dated 2019    PROCEDURE:   CT of the Chest, abdomen and pelvis was performed without intravenous   contrast.  Oral contrast was not administered.      FINDINGS:    CHEST:  LUNG AND LARGE AIRWAYS: Interval development of opacities in the right   upper lobe posteriorly likely on the basis of pneumonia. The mass in the   right middle lobe measures 7.1 x 5.5 cm previously 6.4 x 4.5 cm. Interval   development of opacities in the right lower lobe, likely on the basis of   pneumonia. There is atelectasis involving the left lower lobe medially.   There is a small amount of atelectasis involving the lingula.  PLEURA: within normal limits.  VESSELS: Atherosclerotic changes in the aorta and coronary arteries  HEART: normal size. No pericardial effusion.  MEDIASTINUM AND [place]: within normal limits.  CHEST WALL AND LOWER NECK: There is a new subcutaneous nodule in the left   anterior chest wall measuring 1.1 cm. A medially located left anterior   chest wall nodule measuring 8 mm is stable. A 4.6 cm left lobe thyroid   nodule is stable.    ABDOMEN:  LIVER: Calcified granuloma.  BILE DUCTS: normal caliber.  GALLBLADDER: Distended containing a gallstone.  PANCREAS: within normal limits.  SPLEEN: within normal limits.  ADRENALS: within normal limits.  KIDNEYS: Bilateral renal hypodensities measuring up to 4.3 cm on the   right. A 1.4 cm hyperattenuating lesion in the upper pole of the right   kidney is again noted and may represent hemorrhagic cyst.    PELVIS:  REPRODUCTIVE ORGANS: no pelvic masses.  URETERS: within normal limits.  BLADDER: within normal limits.    BOWEL: There is a metallic clip again noted within the gastric fundus.   The colon is distended containing stool and air. There is asymmetric wall   thickening of the right side of the rectum.  PERITONEUM: no ascites or free air, no fluid collection.  VESSELS: Atherosclerotic changes. There is an IVC filter.  RETROPERITONEUM: No enlarged retroperitoneal or pelvic nodes.  ABDOMINAL WALL: There are postsurgical changes in both inguinal regions   again noted, left greater than right.  BONES: There is a mild to moderate anterior wedging deformity of the T12   vertebral body.    IMPRESSION: Increase in size of the right middle lobe lung mass.    New opacities in the right upper lobe and right lower lobe likely on the   basis of pneumonia. Tumor infiltration not excluded.    Areas of atelectasis involving the left lower lobe and lingula now noted.    2 subcutaneous nodules left anterior chest wall one of which is new and   for which metastatic disease is in the differential diagnosis.    Distended colon containing stool and air.    Asymmetric wall thickening right lateral rectum. This could represent   focal proctitis, however, direct visualization is suggested when   clinically feasible.    ***Please see the addendum at the top of this report. It may contain   additional important information or changes.****      PHYSICAL EXAM:  GENERAL: NAD, well-groomed, well-developed  HEAD:  Atraumatic, Normocephalic  EYES: EOMI, PERRLA, conjunctiva and sclera clear  HEENT: Moist mucous membranes  NECK: Supple, No JVD  NERVOUS SYSTEM:  Alert & Oriented X3, Motor Strength 5/5 B/L upper and lower extremities; DTRs 2+ intact and symmetric  CHEST/LUNG: occasional rhonchi  HEART: Regular rate and rhythm; No murmurs, rubs, or gallops  ABDOMEN: Soft, Nontender, Nondistended; Bowel sounds present  GENITOURINARY- no palpable bladder  EXTREMITIES:  RT AKA  MUSCULOSKELTAL- No muscle tenderness, Muscle tone normal, No joint tenderness, no Joint swelling, Joint range of motion-normal  SKIN- multiple skin hematomas  CNS- alert, oriented X3, non focal     MEDICATIONS  (STANDING):  buDESOnide    Inhalation Suspension 0.5 milliGRAM(s) Inhalation two times a day  cefepime  Injectable. 1000 milliGRAM(s) IV Push daily  epoetin nelly Injectable 07293 Unit(s) IV Push <User Schedule>  filgrastim-sndz Injectable 480 MICROGram(s) SubCutaneous daily  finasteride 5 milliGRAM(s) Oral daily  fluticasone propionate 50 MICROgram(s)/spray Nasal Spray 1 Spray(s) Both Nostrils daily  pantoprazole  Injectable 40 milliGRAM(s) IV Push two times a day  vancomycin    Solution 125 milliGRAM(s) Oral every 6 hours    MEDICATIONS  (PRN):  acetaminophen   Tablet .. 650 milliGRAM(s) Oral every 6 hours PRN Temp greater or equal to 38C (100.4F), Mild Pain (1 - 3)  ALBUTerol    90 MICROgram(s) HFA Inhaler 2 Puff(s) Inhalation every 6 hours PRN Shortness of Breath and/or Wheezing    Assessment/Plan  84 y.o. Male with PMHx of ESRD on HD (MWF), HTN, RA on MTX and steroids, R AKA, Hx of severe CDI and megacolon, Severe PAD s/p LLE endarterectomy complicated by left groin infections s/p Tx and muscle flap, anemia of chronic dx s/p transfusions in the past, COPD/SHAYY, diastolic CHF, GERD, Gout, HLD, recent Dx of NSCLC not treated yet and planned for XRT this month sent from SNF due to fever up to 104F and black tarry stools this am as well as dry blood in mouth. Pt was lethargic day prior, c/o lower back pain. Found to have coagulopathy with INR>8, guaiac +, severe neutropenia, RUL/RLL infiltrates, increased in size RML mass. In ED pt was febrile, tachycardic and hypotensive, but rejected by intensivist for admission to MICU.  At the time of evaluation pt is more alert, shivering, c/o lower back pain with slightly improved BP.    #Sepsis due to right sided PNA likely with gram negative organisms in the settings of immunosuppression on methotrexate, steroids and progressive NSCLC of RML  #Gram-negative bacteremia  F/u blood c/s- GNR  ID eval appreciated, further IV abx per ID  Oncology eval    #Neutropenia/pancytopenia  Oncology eval  F/u counts    #Coagulopathy - reversed with IV Vit K and KCentra with repeat INR 2 - f/u repeat in am    #GIB in the settings of coagulopathy  #Anemia acute blood loss on chronic disease  S/p PRBC transfusion  Monitor   GI eval appreciated  Diet resumed    #Afib on cardizem - held due to hypotension    #ESRD on HD  #Hyperkalemia- resolved  Renal f/u appreciated  Cont HD per renal    #Gout - do not stop allopurinol    #COPD/SHAYY - cont current meds    #HFpEF - monitor for fluid overload    #No VTE proph due to GIB    #Dispo- cont SDU care. D/w pt and daughter at bedside.
Patient is a 84y old  Male who presents with a chief complaint of fever, tarry stools (01 Sep 2019 15:09)        Date of service: 09-03-19 @ 11:19    Patient lying in bed  Afebrile, no complaints    ROS unable to obtain secondary to patient medical condition     MEDICATIONS  (STANDING):  buDESOnide    Inhalation Suspension 0.5 milliGRAM(s) Inhalation two times a day  cefepime  Injectable. 1000 milliGRAM(s) IV Push daily  diltiazem    Tablet 30 milliGRAM(s) Oral every 6 hours  epoetin nelly Injectable 47201 Unit(s) IV Push <User Schedule>  filgrastim-sndz Injectable 480 MICROGram(s) SubCutaneous daily  finasteride 5 milliGRAM(s) Oral daily  fluticasone propionate 50 MICROgram(s)/spray Nasal Spray 1 Spray(s) Both Nostrils daily  pantoprazole  Injectable 40 milliGRAM(s) IV Push two times a day  vancomycin    Solution 125 milliGRAM(s) Oral every 6 hours    MEDICATIONS  (PRN):  acetaminophen   Tablet .. 650 milliGRAM(s) Oral every 6 hours PRN Temp greater or equal to 38C (100.4F), Mild Pain (1 - 3)  ALBUTerol    90 MICROgram(s) HFA Inhaler 2 Puff(s) Inhalation every 6 hours PRN Shortness of Breath and/or Wheezing  methocarbamol 500 milliGRAM(s) Oral every 8 hours PRN mild pain  oxyCODONE    5 mG/acetaminophen 325 mG 1 Tablet(s) Oral every 4 hours PRN Moderate Pain (4 - 6)      Vital Signs Last 24 Hrs  T(C): 36.5 (03 Sep 2019 09:38), Max: 37.5 (02 Sep 2019 21:28)  T(F): 97.7 (03 Sep 2019 09:38), Max: 99.5 (02 Sep 2019 21:28)  HR: 83 (03 Sep 2019 09:00) (71 - 101)  BP: 110/40 (03 Sep 2019 09:00) (110/40 - 150/43)  BP(mean): 59 (03 Sep 2019 09:00) (59 - 87)  RR: 14 (03 Sep 2019 09:00) (12 - 22)  SpO2: 98% (03 Sep 2019 09:00) (95% - 100%)    Physical Exam:            PE:    Constitutional: frail looking  HEENT: NC/AT, EOMI, PERRLA, conjunctivae clear; ears and nose atraumatic; pharynx clear  Neck: supple; thyroid not palpable  Back: no tenderness  Respiratory: respiratory effort normal; scattered coarse breath sounds  Cardiovascular: S1S2 regular, no murmurs  Abdomen: soft, not tender, not distended, positive BS; no liver or spleen organomegaly  Genitourinary: no suprapubic tenderness  Musculoskeletal: no muscle tenderness, right upper extremity with vascular access; right AKA  Neurological/ Psychiatric:   moving all extremities  Skin: no rashes; no palpable lesions    Labs: all available labs reviewed                  Labs:                        9.0    0.30  )-----------( 15       ( 03 Sep 2019 08:28 )             28.9     09-03    143  |  107  |  46<H>  ----------------------------<  75  3.6   |  25  |  2.26<H>    Ca    7.9<L>      03 Sep 2019 08:28  Phos  5.0     09-01  Mg     1.9     09-03    TPro  x   /  Alb  2.1<L>  /  TBili  x   /  DBili  x   /  AST  x   /  ALT  x   /  AlkPhos  x   09-01           Cultures:       Culture - Urine (collected 09-01-19 @ 11:27)  Source: .Urine Clean Catch (Midstream)  Final Report (09-02-19 @ 11:06):    <10,000 CFU/mL Normal Urogenital Layla    Culture - Blood (collected 09-01-19 @ 11:17)  Source: .Blood Blood-Peripheral  Gram Stain (09-02-19 @ 00:11):    Growth in aerobic bottle: Gram Negative Rods    Growth in anaerobic bottle: Gram Negative Rods  Preliminary Report (09-02-19 @ 19:02):    Growth in aerobic and anaerobic bottles: Escherichia coli    "Due to technical problems, Proteus sp. will Not be reported as part of    the BCID panel until further notice"    ***Blood Panel PCR results on this specimen are available    approximately 3 hours after the Gram stain result.***    Gram stain, PCR, and/or culture results may not always    correspond due to difference in methodologies.    ************************************************************    This PCR assay was performed using [employer].    The following targets are tested for: Enterococcus,    vancomycin resistant enterococci, Listeria monocytogenes,    coagulase negative staphylococci, S. aureus,    methicillin resistant S. aureus, Streptococcus agalactiae    (Group B), S. pneumoniae, S.pyogenes (Group A),    Acinetobacter baumannii, Enterobacter cloacae, E. coli,    Klebsiella oxytoca, K. pneumoniae, Proteus sp.,    Serratia marcescens, Haemophilus influenzae,    Neisseria meningitidis, Pseudomonas aeruginosa, Candida    albicans, C. glabrata, C krusei, C parapsilosis,    C. tropicalis and the KPC resistance gene.  Organism: Blood Culture PCR (09-02-19 @ 02:50)  Organism: Blood Culture PCR (09-02-19 @ 02:50)      -  Escherichia coli: Detec      Method Type: PCR    Culture - Blood (collected 09-01-19 @ 11:17)  Source: .Blood Blood-Peripheral  Gram Stain (09-02-19 @ 01:10):    Growth in aerobic and anaerobic bottles:    Gram Negative Rods  Preliminary Report (09-02-19 @ 20:32):    Growth in aerobic and anaerobic bottles: Escherichia coli    See previous culture [id number]                < from: CT Abdomen and Pelvis No Cont (09.01.19 @ 12:12) >    EXAM:  CT ABDOMEN AND PELVIS                          EXAM:  CT CHEST                            *** ADDENDUM 09/01/2019  ***    This addendum is dated 9/1/2019 at 12:49 PM.    There is some edema in the right paracolic gutter and adjacent to the   mesentery of the ascending colon. There is no colonic wall thickening in   this region but the possibility of mild colitis involving the ascending   colon is considered.    Differential diagnosis for the appearance of the focal thickening of the   right lateral wall of the rectum is focal proctitis or tumor. Again   direct visualization is recommended when clinically feasible.      *** END OF ADDENDUM 09/01/2019  ***      PROCEDURE DATE:  09/01/2019          INTERPRETATION:  Clinical information:Sepsis and lung cancer, back pain   and GI bleed.    Comparison: PET/CT dated 6/4/2019    PROCEDURE:   CT of the Chest, abdomen and pelvis was performed without intravenous   contrast.  Oral contrast was not administered.        FINDINGS:    CHEST:    LUNG AND LARGE AIRWAYS: Interval development of opacities in the right   upper lobe posteriorly likely on the basis of pneumonia. The mass in the   right middle lobe measures 7.1 x 5.5 cm previously 6.4 x 4.5 cm. Interval   development of opacities in the right lower lobe, likely on the basis of   pneumonia. There is atelectasis involving the left lower lobe medially.   There is a small amount of atelectasis involving the lingula.  PLEURA: within normal limits.  VESSELS: Atherosclerotic changes in the aorta and coronary arteries  HEART: normal size. No pericardial effusion.  MEDIASTINUM AND [place]: within normal limits.  CHEST WALL AND LOWER NECK: There is a new subcutaneous nodule in the left   anterior chest wall measuring 1.1 cm. A mediallylocated left anterior   chest wall nodule measuring 8 mm is stable. A 4.6 cm left lobe thyroid   nodule is stable.    ABDOMEN:  LIVER: Calcified granuloma.  BILE DUCTS: normal caliber.  GALLBLADDER: Distended containing a gallstone.  PANCREAS: withinnormal limits.  SPLEEN: within normal limits.  ADRENALS: within normal limits.  KIDNEYS: Bilateral renal hypodensities measuring up to 4.3 cm on the   right. A 1.4 cm hyperattenuating lesion in the upper pole of the right   kidney is again noted and may represent hemorrhagic cyst.    PELVIS:  REPRODUCTIVE ORGANS: no pelvic masses.  URETERS: within normal limits.  BLADDER: within normal limits.      BOWEL: There is a metallic clip again noted within the gastric fundus.   The colon is distended containing stool and air. There is asymmetric wall   thickening of the right side of the rectum.  PERITONEUM: no ascites or free air, no fluid collection.  VESSELS: Atherosclerotic changes. There is an IVC filter.  RETROPERITONEUM: No enlarged retroperitoneal or pelvic nodes.  ABDOMINAL WALL: There are postsurgical changes in both inguinal regions   again noted, left greater than right.  BONES: There is a mild to moderate anterior wedging deformity of the T12   vertebral body.    IMPRESSION: Increasein size of the right middle lobe lung mass.    New opacities in the right upper lobe and right lower lobe likely on the   basis of pneumonia. Tumor infiltration not excluded.    Areas of atelectasis involving the left lower lobe and lingula now noted.    2 subcutaneous nodules left anterior chest wall one of which is new and   for which metastatic disease is in the differential diagnosis.    Distended colon containing stool and air.    Asymmetric wall thickening right lateral rectum. This could represent   focal proctitis, however, direct visualization is suggested when   clinically feasible.        ***Please see the addendum at the top of this report. It may contain   additional important information or changes.****      < end of copied text >        Radiology: all available radiological tests reviewed    Advanced directives addressed: full resuscitation
Patient is a 84y old  Male who presents with a chief complaint of fever, tarry stools (01 Sep 2019 15:09)      Date of service: 09-02-19 @ 10:09      Patient undergoing hemodialysis; awake, alert  Afebrile this am    ROS: unable to obtain secondary to patient medical condition     MEDICATIONS  (STANDING):  buDESOnide    Inhalation Suspension 0.5 milliGRAM(s) Inhalation two times a day  cefepime  Injectable. 1000 milliGRAM(s) IV Push daily  finasteride 5 milliGRAM(s) Oral daily  fluticasone propionate 50 MICROgram(s)/spray Nasal Spray 1 Spray(s) Both Nostrils daily  pantoprazole  Injectable 40 milliGRAM(s) IV Push two times a day  vancomycin    Solution 125 milliGRAM(s) Oral every 6 hours    MEDICATIONS  (PRN):  acetaminophen   Tablet .. 650 milliGRAM(s) Oral every 6 hours PRN Temp greater or equal to 38C (100.4F), Mild Pain (1 - 3)  ALBUTerol    90 MICROgram(s) HFA Inhaler 2 Puff(s) Inhalation every 6 hours PRN Shortness of Breath and/or Wheezing      Vital Signs Last 24 Hrs  T(C): 36.4 (02 Sep 2019 05:38), Max: 39 (01 Sep 2019 11:31)  T(F): 97.6 (02 Sep 2019 05:38), Max: 102.2 (01 Sep 2019 11:31)  HR: 75 (02 Sep 2019 08:08) (71 - 124)  BP: 126/43 (02 Sep 2019 06:00) (72/16 - 134/44)  BP(mean): 65 (02 Sep 2019 06:00) (63 - 85)  RR: 19 (02 Sep 2019 06:00) (11 - 24)  SpO2: 97% (02 Sep 2019 05:00) (90% - 100%)    Physical Exam:        PE:    Constitutional: frail looking  HEENT: NC/AT, EOMI, PERRLA, conjunctivae clear; ears and nose atraumatic; pharynx clear  Neck: supple; thyroid not palpable  Back: no tenderness  Respiratory: respiratory effort normal; scattered coarse breath sounds  Cardiovascular: S1S2 regular, no murmurs  Abdomen: soft, not tender, not distended, positive BS; no liver or spleen organomegaly  Genitourinary: no suprapubic tenderness  Musculoskeletal: no muscle tenderness, right upper extremity with vascular access; right AKA  Neurological/ Psychiatric:   moving all extremities  Skin: no rashes; no palpable lesions    Labs: all available labs reviewed                       Labs:                        7.8    0.34  )-----------( 15       ( 02 Sep 2019 03:48 )             25.6     09-02    142  |  105  |  59<H>  ----------------------------<  106<H>  4.8   |  30  |  2.61<H>    Ca    7.7<L>      02 Sep 2019 03:48  Phos  5.0     09-01    TPro  x   /  Alb  2.1<L>  /  TBili  x   /  DBili  x   /  AST  x   /  ALT  x   /  AlkPhos  x   09-01           Cultures:       Culture - Blood (collected 09-01-19 @ 11:17)  Source: .Blood Blood-Peripheral  Gram Stain (09-02-19 @ 00:11):    Growth in aerobic bottle: Gram Negative Rods    Growth in anaerobic bottle: Gram Negative Rods  Preliminary Report (09-02-19 @ 00:12):    Growth in aerobic bottle: Gram Negative Rods    Growth in anaerobic bottle: Gram Negative Rods    "Due to technical problems, Proteus sp. will Not be reported as part of    the BCID panel until further notice"    ***Blood Panel PCR results on this specimenare available    approximately 3 hours after the Gram stain result.***    Gram stain, PCR, and/or culture results may not always    correspond due to difference in methodologies.    ************************************************************    This PCR assaywas performed using [employer].    The following targets are tested for: Enterococcus,    vancomycin resistant enterococci, Listeria monocytogenes,    coagulase negative staphylococci, S. aureus,    methicillin resistant S. aureus, Streptococcus agalactiae    (Group B), S. pneumoniae, S. pyogenes (Group A),    Acinetobacter baumannii, Enterobacter cloacae, E. coli,    Klebsiella oxytoca, K. pneumoniae, Proteus sp.,    Serratia marcescens, Haemophilus influenzae,    Neisseria meningitidis, Pseudomonas aeruginosa, Candida    albicans, C. glabrata, C krusei, C parapsilosis,    C. tropicalis and the KPC resistance gene.  Organism: Blood Culture PCR (09-02-19 @ 02:50)  Organism: Blood Culture PCR (09-02-19 @ 02:50)      -  Escherichia coli: Detec      Method Type: PCR    Culture - Blood (collected 09-01-19 @ 11:17)  Source: .Blood Blood-Peripheral  Gram Stain (09-02-19 @ 01:10):    Growth in aerobic and anaerobic bottles:    Gram Negative Rods  Preliminary Report (09-02-19 @ 01:10):    Growth in aerobic and anaerobic bottles:    Gram Negative Rods                < from: CT Abdomen and Pelvis No Cont (09.01.19 @ 12:12) >    EXAM:  CT ABDOMEN AND PELVIS                          EXAM:  CT CHEST                            *** ADDENDUM 09/01/2019  ***    This addendum is dated 9/1/2019 at 12:49 PM.    There is some edema in the right paracolic gutter and adjacent to the   mesentery of the ascending colon. There is no colonic wall thickening in   this region but the possibility of mild colitis involving the ascending   colon is considered.    Differential diagnosis for the appearance of the focal thickening of the   right lateral wall of the rectum is focal proctitis or tumor. Again   direct visualization is recommended when clinically feasible.      *** END OF ADDENDUM 09/01/2019  ***      PROCEDURE DATE:  09/01/2019          INTERPRETATION:  Clinical information:Sepsis and lung cancer, back pain   and GI bleed.    Comparison: PET/CT dated 6/4/2019    PROCEDURE:   CT of the Chest, abdomen and pelvis was performed without intravenous   contrast.  Oral contrast was not administered.        FINDINGS:    CHEST:    LUNG AND LARGE AIRWAYS: Interval development of opacities in the right   upper lobe posteriorly likely on the basis of pneumonia. The mass in the   right middle lobe measures 7.1 x 5.5 cm previously 6.4 x 4.5 cm. Interval   development of opacities in the right lower lobe, likely on the basis of   pneumonia. There is atelectasis involving the left lower lobe medially.   There is a small amount of atelectasis involving the lingula.  PLEURA: within normal limits.  VESSELS: Atherosclerotic changes in the aorta and coronary arteries  HEART: normal size. No pericardial effusion.  MEDIASTINUM AND [place]: within normal limits.  CHEST WALL AND LOWER NECK: There is a new subcutaneous nodule in the left   anterior chest wall measuring 1.1 cm. A mediallylocated left anterior   chest wall nodule measuring 8 mm is stable. A 4.6 cm left lobe thyroid   nodule is stable.    ABDOMEN:  LIVER: Calcified granuloma.  BILE DUCTS: normal caliber.  GALLBLADDER: Distended containing a gallstone.  PANCREAS: withinnormal limits.  SPLEEN: within normal limits.  ADRENALS: within normal limits.  KIDNEYS: Bilateral renal hypodensities measuring up to 4.3 cm on the   right. A 1.4 cm hyperattenuating lesion in the upper pole of the right   kidney is again noted and may represent hemorrhagic cyst.    PELVIS:  REPRODUCTIVE ORGANS: no pelvic masses.  URETERS: within normal limits.  BLADDER: within normal limits.      BOWEL: There is a metallic clip again noted within the gastric fundus.   The colon is distended containing stool and air. There is asymmetric wall   thickening of the right side of the rectum.  PERITONEUM: no ascites or free air, no fluid collection.  VESSELS: Atherosclerotic changes. There is an IVC filter.  RETROPERITONEUM: No enlarged retroperitoneal or pelvic nodes.  ABDOMINAL WALL: There are postsurgical changes in both inguinal regions   again noted, left greater than right.  BONES: There is a mild to moderate anterior wedging deformity of the T12   vertebral body.    IMPRESSION: Increasein size of the right middle lobe lung mass.    New opacities in the right upper lobe and right lower lobe likely on the   basis of pneumonia. Tumor infiltration not excluded.    Areas of atelectasis involving the left lower lobe and lingula now noted.    2 subcutaneous nodules left anterior chest wall one of which is new and   for which metastatic disease is in the differential diagnosis.    Distended colon containing stool and air.    Asymmetric wall thickening right lateral rectum. This could represent   focal proctitis, however, direct visualization is suggested when   clinically feasible.        ***Please see the addendum at the top of this report. It may contain   additional important information or changes.****      < end of copied text >        Radiology: all available radiological tests reviewed    Advanced directives addressed: full resuscitation
Patient is a 84y old  Male who presents with a chief complaint of fever, tarry stools (01 Sep 2019 15:09)  Date of service: 09-10-19 @ 08:51        Patient lying in bed  Is complaining of oral pain; difficulty swallowing    ROS unable to obtain secondary to patient medical condition     MEDICATIONS  (STANDING):  acetaminophen   Tablet .. 650 milliGRAM(s) Oral every 12 hours  cefepime  Injectable. 1000 milliGRAM(s) IV Push daily  diltiazem    Tablet 60 milliGRAM(s) Oral every 6 hours  epoetin nelly Injectable 37880 Unit(s) IV Push <User Schedule>  fentaNYL   Patch  12 MICROgram(s)/Hr 1 Patch Transdermal every 72 hours  filgrastim-sndz Injectable 480 MICROGram(s) SubCutaneous daily  finasteride 5 milliGRAM(s) Oral daily  fluconAZOLE IVPB      fluconAZOLE IVPB 100 milliGRAM(s) IV Intermittent every 24 hours  fluticasone propionate 50 MICROgram(s)/spray Nasal Spray 1 Spray(s) Both Nostrils daily  lidocaine   Patch 1 Patch Transdermal daily  pantoprazole  Injectable 40 milliGRAM(s) IV Push two times a day  vancomycin    Solution 125 milliGRAM(s) Oral every 6 hours    MEDICATIONS  (PRN):  acetaminophen   Tablet .. 650 milliGRAM(s) Oral every 6 hours PRN Temp greater or equal to 38C (100.4F), Mild Pain (1 - 3)  ALBUTerol    90 MICROgram(s) HFA Inhaler 2 Puff(s) Inhalation every 6 hours PRN Shortness of Breath and/or Wheezing  HYDROmorphone  Injectable 0.5 milliGRAM(s) IV Push every 4 hours PRN brekathrough pain or dyspnea      Vital Signs Last 24 Hrs  T(C): 37.1 (10 Sep 2019 05:09), Max: 37.1 (09 Sep 2019 10:10)  T(F): 98.8 (10 Sep 2019 05:09), Max: 98.8 (09 Sep 2019 10:10)  HR: 84 (10 Sep 2019 05:09) (70 - 104)  BP: 113/37 (10 Sep 2019 05:09) (98/67 - 132/44)  BP(mean): --  RR: 18 (10 Sep 2019 05:09) (17 - 18)  SpO2: 94% (10 Sep 2019 05:09) (94% - 94%)    Physical Exam:        PE:    Constitutional: frail looking  HEENT: NC/AT, EOMI, PERRLA, conjunctivae clear; ears and nose atraumatic; white coating on tongue  Neck: supple; thyroid not palpable  Back: no tenderness  Respiratory: respiratory effort normal; scattered coarse breath sounds  Cardiovascular: S1S2 regular, no murmurs  Abdomen: soft, not tender, not distended, positive BS; no liver or spleen organomegaly  Genitourinary: no suprapubic tenderness  Musculoskeletal: no muscle tenderness, right upper extremity with vascular access; right AKA  Neurological/ Psychiatric:   moving all extremities  Skin: no rashes; no palpable lesions    Labs: all available labs reviewed                Labs:                        9.0    1.88  )-----------( 34       ( 09 Sep 2019 10:10 )             28.8     09-09    134<L>  |  98  |  75<H>  ----------------------------<  108<H>  4.7   |  27  |  4.19<H>    Ca    8.6      09 Sep 2019 10:10  Phos  3.6     09-09    TPro  x   /  Alb  1.7<L>  /  TBili  x   /  DBili  x   /  AST  x   /  ALT  x   /  AlkPhos  x   09-09           Cultures:       Culture - Blood (collected 09-05-19 @ 09:55)  Source: .Blood None  Preliminary Report (09-06-19 @ 16:01):    No growth to date.    Culture - Blood (collected 09-05-19 @ 09:48)  Source: .Blood None  Preliminary Report (09-06-19 @ 16:01):    No growth to date.                    < from: CT Abdomen and Pelvis No Cont (09.01.19 @ 12:12) >    EXAM:  CT ABDOMEN AND PELVIS                          EXAM:  CT CHEST                          Labs:                   *** ADDENDUM 09/01/2019  ***    This addendum is dated 9/1/2019 at 12:49 PM.    There is some edema in the right paracolic gutter and adjacent to the   mesentery of the ascending colon. There is no colonic wall thickening in   this region but the possibility of mild colitis involving the ascending   colon is considered.    Differential diagnosis for the appearance of the focal thickening of the   right lateral wall of the rectum is focal proctitis or tumor. Again   direct visualization is recommended when clinically feasible.      *** END OF ADDENDUM 09/01/2019  ***      PROCEDURE DATE:  09/01/2019          INTERPRETATION:  Clinical information:Sepsis and lung cancer, back pain   and GI bleed.    Comparison: PET/CT dated 6/4/2019    PROCEDURE:   CT of the Chest, abdomen and pelvis was performed without intravenous   contrast.  Oral contrast was not administered.        FINDINGS:    CHEST:    LUNG AND LARGE AIRWAYS: Interval development of opacities in the right   upper lobe posteriorly likely on the basis of pneumonia. The mass in the   right middle lobe measures 7.1 x 5.5 cm previously 6.4 x 4.5 cm. Interval   development of opacities in the right lower lobe, likely on the basis of   pneumonia. There is atelectasis involving the left lower lobe medially.   There is a small amount of atelectasis involving the lingula.  PLEURA: within normal limits.  VESSELS: Atherosclerotic changes in the aorta and coronary arteries  HEART: normal size. No pericardial effusion.  MEDIASTINUM AND [place]: within normal limits.  CHEST WALL AND LOWER NECK: There is a new subcutaneous nodule in the left   anterior chest wall measuring 1.1 cm. A mediallylocated left anterior   chest wall nodule measuring 8 mm is stable. A 4.6 cm left lobe thyroid   nodule is stable.    ABDOMEN:  LIVER: Calcified granuloma.  BILE DUCTS: normal caliber.  GALLBLADDER: Distended containing a gallstone.  PANCREAS: withinnormal limits.  SPLEEN: within normal limits.  ADRENALS: within normal limits.  KIDNEYS: Bilateral renal hypodensities measuring up to 4.3 cm on the   right. A 1.4 cm hyperattenuating lesion in the upper pole of the right   kidney is again noted and may represent hemorrhagic cyst.    PELVIS:  REPRODUCTIVE ORGANS: no pelvic masses.  URETERS: within normal limits.  BLADDER: within normal limits.      BOWEL: There is a metallic clip again noted within the gastric fundus.   The colon is distended containing stool and air. There is asymmetric wall   thickening of the right side of the rectum.  PERITONEUM: no ascites or free air, no fluid collection.  VESSELS: Atherosclerotic changes. There is an IVC filter.  RETROPERITONEUM: No enlarged retroperitoneal or pelvic nodes.  ABDOMINAL WALL: There are postsurgical changes in both inguinal regions   again noted, left greater than right.  BONES: There is a mild to moderate anterior wedging deformity of the T12   vertebral body.    IMPRESSION: Increasein size of the right middle lobe lung mass.    New opacities in the right upper lobe and right lower lobe likely on the   basis of pneumonia. Tumor infiltration not excluded.    Areas of atelectasis involving the left lower lobe and lingula now noted.    2 subcutaneous nodules left anterior chest wall one of which is new and   for which metastatic disease is in the differential diagnosis.    Distended colon containing stool and air.    Asymmetric wall thickening right lateral rectum. This could represent   focal proctitis, however, direct visualization is suggested when   clinically feasible.        ***Please see the addendum at the top of this report. It may contain   additional important information or changes.****      < end of copied text >        Radiology: all available radiological tests reviewed    Advanced directives addressed: full resuscitation
Patient is a 84y old  Male who presents with a chief complaint of fever, tarry stools (03 Sep 2019 11:18)      HPI:  84 y.o. Male with PMHx of ESRD on HD (MWF), HTN, RA on MTX and steroids, R AKA, Hx of severe CDI and megacolon, Severe PAD s/p LLE endarterectomy complicated by left groin infections s/p Tx and muscle flap, recent Dx of NSCLC not treated yet and planned for XRT this month sent from SNF due to fever up to 104F and black tarry stools this am as well as dry blood in mouth. Pt was lethargic day prior, c/o lower back pain. Found to have coagulopathy with INR>8, guaiac +, severe neutropenia, RUL/RLL infiltrates, increased in size RML mass. In ED pt was febrile, tachycardic and hypotensive, but rejected by intensivist for admission to MICU.  At the time of evaluation pt is more alert, shivering, c/o lower back pain with slightly improved BP. (01 Sep 2019 15:09)      history per patient and daughter. Although chart states melena, per RN, patient had been having brown stools. Additionally, in the appendix wall amount of black stools noted as well. However, patient's daughter who also renders history relates that patient was having brown stools but had some blood noted by mouth site where he had a loose tooth/implants that appears to have changed. It appears to be a little bit pushed into the gums.  At this time, I do not see active bleeding from the site.      PAST MEDICAL & SURGICAL HISTORY:  Rheumatoid arthritis  Above knee amputation of right lower extremity  Recurrent Clostridium difficile diarrhea  Diastolic CHF  Peripheral vascular disease  Afib  Anemia  CKD (chronic kidney disease)  COPD (chronic obstructive pulmonary disease)  SHAYY (obstructive sleep apnea)  Sepsis, due to unspecified organism: 2/2 poorly healing wounds b/l  Dyspepsia: On moderate exertion.  Sleep apnea, obstructive: Requires home 02 therapy, and treatment with BIPAP  Atelectasis  Pleural effusion, bilateral  Respiratory failure  Peripheral edema  CRI (chronic renal insufficiency)  Gout  Benign prostatic hypertrophy  Spinal stenosis  Hypercholesterolemia  GERD (gastroesophageal reflux disease)  CAD (coronary artery disease)  Hypertension  S/P angioplasty with stent  Cataract of left eye  Prostate: Surgery green light procedure.  S/P rotator cuff surgery: Right  S/P angioplasty      MEDICATIONS  (STANDING):  buDESOnide    Inhalation Suspension 0.5 milliGRAM(s) Inhalation two times a day  cefepime  Injectable. 1000 milliGRAM(s) IV Push daily  diltiazem    Tablet 30 milliGRAM(s) Oral every 6 hours  epoetin nelly Injectable 92301 Unit(s) IV Push <User Schedule>  filgrastim-sndz Injectable 480 MICROGram(s) SubCutaneous daily  finasteride 5 milliGRAM(s) Oral daily  fluticasone propionate 50 MICROgram(s)/spray Nasal Spray 1 Spray(s) Both Nostrils daily  pantoprazole  Injectable 40 milliGRAM(s) IV Push two times a day  vancomycin    Solution 125 milliGRAM(s) Oral every 6 hours    MEDICATIONS  (PRN):  acetaminophen   Tablet .. 650 milliGRAM(s) Oral every 6 hours PRN Temp greater or equal to 38C (100.4F), Mild Pain (1 - 3)  ALBUTerol    90 MICROgram(s) HFA Inhaler 2 Puff(s) Inhalation every 6 hours PRN Shortness of Breath and/or Wheezing  methocarbamol 500 milliGRAM(s) Oral every 8 hours PRN mild pain  oxyCODONE    5 mG/acetaminophen 325 mG 1 Tablet(s) Oral every 4 hours PRN Moderate Pain (4 - 6)      Allergies    Zosyn (Rash)    Intolerances        SOCIAL HISTORY:in rehab    FAMILY HISTORY:  Family history of colorectal cancer  Family history of diabetes mellitus (Sibling)  Family history of hypertension      REVIEW OF SYSTEMS:    CONSTITUTIONAL: pos weakness,   EYES/ENT: No visual changes;  No vertigo or throat pain   NECK: No pain or stiffness  RESPIRATORY: No cough, wheezing, hemoptysis; No shortness of breath  CARDIOVASCULAR: No chest pain or palpitations  GENITOURINARY: No dysuria, frequency or hematuria  NEUROLOGICAL: No numbness or weakness  SKIN: No itching, burning, rashes, or lesions   All other review of systems is negative unless indicated above.    Vital Signs Last 24 Hrs  T(C): 37.3 (03 Sep 2019 16:36), Max: 37.7 (03 Sep 2019 13:57)  T(F): 99.1 (03 Sep 2019 16:36), Max: 99.9 (03 Sep 2019 13:57)  HR: 99 (03 Sep 2019 17:00) (76 - 101)  BP: 143/47 (03 Sep 2019 17:00) (110/40 - 150/43)  BP(mean): 74 (03 Sep 2019 17:00) (59 - 87)  RR: 22 (03 Sep 2019 17:00) (14 - 22)  SpO2: 94% (03 Sep 2019 17:00) (92% - 100%)    PHYSICAL EXAM:    Constitutional: NAD, well-developed  HEENT: EOMI, throat clear  Neck: No LAD, supple  Respiratory: CTA and P  Cardiovascular: S1 and S2, RRR, no M  Gastrointestinal: BS+, soft, NT/ND, neg HSM,  loose dental implant    Neurological: A/O x 3, no focal deficits  Psychiatric: Normal mood, normal affect  Skin: No rashes    LABS:  CBC Full  -  ( 03 Sep 2019 08:28 )  WBC Count : 0.30 K/uL  RBC Count : 3.15 M/uL  Hemoglobin : 9.0 g/dL  Hematocrit : 28.9 %  Platelet Count - Automated : 15 K/uL  Mean Cell Volume : 91.7 fl  Mean Cell Hemoglobin : 28.6 pg  Mean Cell Hemoglobin Concentration : 31.1 gm/dL  Auto Neutrophil # : 0.22 K/uL  Auto Lymphocyte # : 0.05 K/uL  Auto Monocyte # : 0.03 K/uL  Auto Eosinophil # : 0.01 K/uL  Auto Basophil # : 0.00 K/uL  Auto Neutrophil % : 73.0 %  Auto Lymphocyte % : 15.0 %  Auto Monocyte % : 9.0 %  Auto Eosinophil % : 3.0 %  Auto Basophil % : 0.0 %    09-03    143  |  107  |  46<H>  ----------------------------<  75  3.6   |  25  |  2.26<H>    Ca    7.9<L>      03 Sep 2019 08:28  Phos  5.0     09-01  Mg     1.9     09-03    TPro  x   /  Alb  2.1<L>  /  TBili  x   /  DBili  x   /  AST  x   /  ALT  x   /  AlkPhos  x   09-01    PT/INR - ( 03 Sep 2019 08:28 )   PT: 19.4 sec;   INR: 1.72 ratio             09-01 @ 19:40  Hep A Igm Nonreact  Hep A total ab, IgA and M --  Hep B core Ab total --  Hep B core IgM Nonreact  Hep B DNA PCR --  Hep BSAg --  Hep BSAb --  Hep BSAb --  HCV Ab --  HCV RNA Log --  HCV RNA interp --                RADIOLOGY & ADDITIONAL STUDIES:  < from: CT Abdomen and Pelvis No Cont (09.01.19 @ 12:12) >  EXAM:  CT ABDOMEN AND PELVIS                          EXAM:  CT CHEST                            *** ADDENDUM 09/01/2019  ***    This addendum is dated 9/1/2019 at 12:49 PM.    There is some edema in the right paracolic gutter and adjacent to the   mesentery of the ascending colon. There is no colonic wall thickening in   this region but the possibility of mild colitis involving the ascending   colon is considered.    Differential diagnosis for the appearance of the focal thickening of the   right lateral wall of the rectum is focal proctitis or tumor. Again   direct visualization is recommended when clinically feasible.      *** END OF ADDENDUM 09/01/2019  ***      PROCEDURE DATE:  09/01/2019          INTERPRETATION:  Clinical information:Sepsis and lung cancer, back pain   and GI bleed.    Comparison: PET/CT dated 6/4/2019    PROCEDURE:   CT of the Chest, abdomen and pelvis was performed without intravenous   contrast.  Oral contrast was not administered.        FINDINGS:    CHEST:    LUNG AND LARGE AIRWAYS: Interval development of opacities in the right   upper lobe posteriorly likely on the basis of pneumonia. The mass in the   right middle lobe measures 7.1 x 5.5 cm previously 6.4 x 4.5 cm. Interval   development of opacities in the right lower lobe, likely on the basis of   pneumonia. There is atelectasis involving the left lower lobe medially.   There is a small amount of atelectasis involving the lingula.  PLEURA: within normal limits.  VESSELS: Atherosclerotic changes in the aorta and coronary arteries  HEART: normal size. No pericardial effusion.  MEDIASTINUM AND [place]: within normal limits.  CHEST WALL AND LOWER NECK: There is a new subcutaneous nodule in the left   anterior chest wall measuring 1.1 cm. A mediallylocated left anterior   chest wall nodule measuring 8 mm is stable. A 4.6 cm left lobe thyroid   nodule is stable.    ABDOMEN:  LIVER: Calcified granuloma.  BILE DUCTS: normal caliber.  GALLBLADDER: Distended containing a gallstone.  PANCREAS: withinnormal limits.  SPLEEN: within normal limits.  ADRENALS: within normal limits.  KIDNEYS: Bilateral renal hypodensities measuring up to 4.3 cm on the   right. A 1.4 cm hyperattenuating lesion in the upper pole of the right   kidney is again noted and may represent hemorrhagic cyst.    PELVIS:  REPRODUCTIVE ORGANS: no pelvic masses.  URETERS: within normal limits.  BLADDER: within normal limits.      BOWEL: There is a metallic clip again noted within the gastric fundus.   The colon is distended containing stool and air. There is asymmetric wall   thickening of the right side of the rectum.  PERITONEUM: no ascites or free air, no fluid collection.  VESSELS: Atherosclerotic changes. There is an IVC filter.  RETROPERITONEUM: No enlarged retroperitoneal or pelvic nodes.  ABDOMINAL WALL: There are postsurgical changes in both inguinal regions   again noted, left greater than right.  BONES: There is a mild to moderate anterior wedging deformity of the T12   vertebral body.    IMPRESSION: Increasein size of the right middle lobe lung mass.    New opacities in the right upper lobe and right lower lobe likely on the   basis of pneumonia. Tumor infiltration not excluded.    Areas of atelectasis involving the left lower lobe and lingula now noted.    2 subcutaneous nodules left anterior chest wall one of which is new and   for which metastatic disease is in the differential diagnosis.    Distended colon containing stool and air.    Asymmetric wall thickening right lateral rectum. This could represent   focal proctitis, however, direct visualization is suggested when   clinically feasible.        ***Please see the addendum at the top of this report. It may contain   additional important information or changes.****          JOMAR CARR M.D., ATTENDING RADIOLOGIST  This document has been electronically signed. Sep  1 2019 12:40PM  Addend:  JOMAR CARR M.D., ATTENDING RADIOLOGIST  This addendum was electronically signed on: Sep  1 2019 12:51PM.          < end of copied text >
Patient is a 84y old  Male who presents with a chief complaint of fever, tarry stools (03 Sep 2019 19:29)      HPI:  84 y.o. Male with PMHx of ESRD on HD (MWF), HTN, RA on MTX and steroids, R AKA, Hx of severe CDI and megacolon, Severe PAD s/p LLE endarterectomy complicated by left groin infections s/p Tx and muscle flap, recent Dx of NSCLC not treated yet and planned for XRT this month sent from SNF due to fever up to 104F and black tarry stools this am as well as dry blood in mouth. Pt was lethargic day prior, c/o lower back pain. Found to have coagulopathy with INR>8, guaiac +, severe neutropenia, RUL/RLL infiltrates, increased in size RML mass. In ED pt was febrile, tachycardic and hypotensive, but rejected by intensivist for admission to MICU.  At the time of evaluation pt is more alert, shivering, c/o lower back pain with slightly improved BP. (01 Sep 2019 15:09)    patient comfortable. Negative nausea vomiting. Decreased appetite. Tolerating diet but poor by mouth intake. Negative blood in stool  Positive fatigue.        PAST MEDICAL & SURGICAL HISTORY:  Rheumatoid arthritis  Above knee amputation of right lower extremity  Recurrent Clostridium difficile diarrhea  Diastolic CHF  Peripheral vascular disease  Afib  Anemia  CKD (chronic kidney disease)  COPD (chronic obstructive pulmonary disease)  SHAYY (obstructive sleep apnea)  Sepsis, due to unspecified organism: 2/2 poorly healing wounds b/l  Dyspepsia: On moderate exertion.  Sleep apnea, obstructive: Requires home 02 therapy, and treatment with BIPAP  Atelectasis  Pleural effusion, bilateral  Respiratory failure  Peripheral edema  CRI (chronic renal insufficiency)  Gout  Benign prostatic hypertrophy  Spinal stenosis  Hypercholesterolemia  GERD (gastroesophageal reflux disease)  CAD (coronary artery disease)  Hypertension  S/P angioplasty with stent  Cataract of left eye  Prostate: Surgery green light procedure.  S/P rotator cuff surgery: Right  S/P angioplasty      MEDICATIONS  (STANDING):  buDESOnide    Inhalation Suspension 0.5 milliGRAM(s) Inhalation two times a day  cefepime  Injectable. 1000 milliGRAM(s) IV Push daily  diltiazem    Tablet 30 milliGRAM(s) Oral every 6 hours  epoetin nelly Injectable 65458 Unit(s) IV Push <User Schedule>  filgrastim-sndz Injectable 480 MICROGram(s) SubCutaneous daily  finasteride 5 milliGRAM(s) Oral daily  fluticasone propionate 50 MICROgram(s)/spray Nasal Spray 1 Spray(s) Both Nostrils daily  pantoprazole  Injectable 40 milliGRAM(s) IV Push two times a day  vancomycin    Solution 125 milliGRAM(s) Oral every 6 hours    MEDICATIONS  (PRN):  acetaminophen   Tablet .. 650 milliGRAM(s) Oral every 6 hours PRN Temp greater or equal to 38C (100.4F), Mild Pain (1 - 3)  ALBUTerol    90 MICROgram(s) HFA Inhaler 2 Puff(s) Inhalation every 6 hours PRN Shortness of Breath and/or Wheezing  methocarbamol 500 milliGRAM(s) Oral every 8 hours PRN mild pain  oxyCODONE    5 mG/acetaminophen 325 mG 1 Tablet(s) Oral every 4 hours PRN Moderate Pain (4 - 6)      Allergies    Zosyn (Rash)    Intolerances        SOCIAL HISTORY:NC    FAMILY HISTORY:  Family history of colorectal cancer  Family history of diabetes mellitus (Sibling)  Family history of hypertension      REVIEW OF SYSTEMS:    CONSTITUTIONAL: No weakness, fevers or chills  EYES/ENT: No visual changes;  No vertigo or throat pain   NECK: No pain or stiffness  RESPIRATORY: No cough, wheezing, hemoptysis; No shortness of breath  CARDIOVASCULAR: No chest pain or palpitations  GENITOURINARY: No dysuria, frequency or hematuria  NEUROLOGICAL: No numbness or weakness  SKIN: No itching, burning, rashes, or lesions   All other review of systems is negative unless indicated above.    Vital Signs Last 24 Hrs  T(C): 37.2 (04 Sep 2019 05:00), Max: 38 (03 Sep 2019 20:41)  T(F): 98.9 (04 Sep 2019 05:00), Max: 100.4 (03 Sep 2019 20:41)  HR: 114 (04 Sep 2019 08:00) (80 - 120)  BP: 130/59 (04 Sep 2019 08:00) (103/62 - 149/45)  BP(mean): 78 (04 Sep 2019 08:00) (59 - 90)  RR: 22 (04 Sep 2019 08:00) (14 - 24)  SpO2: 100% (04 Sep 2019 08:00) (92% - 100%)    PHYSICAL EXAM:    Constitutional: NAD, well-developed  HEENT: EOMI, throat clear  Neck: No LAD, supple  Respiratory: CTA and P  Cardiovascular: S1 and S2, RRR, no M  Gastrointestinal: BS+, soft, NT/ND, neg HSM,  Extremities: SP LE surg  Neurological: A/O x 3, no focal deficits  Psychiatric: Normal mood, normal affect  Skin: No rashes    LABS:  CBC Full  -  ( 04 Sep 2019 06:39 )  WBC Count : 0.29 K/uL  RBC Count : 3.02 M/uL  Hemoglobin : 8.6 g/dL  Hematocrit : 27.6 %  Platelet Count - Automated : 11 K/uL  Mean Cell Volume : 91.4 fl  Mean Cell Hemoglobin : 28.5 pg  Mean Cell Hemoglobin Concentration : 31.2 gm/dL  Auto Neutrophil # : x  Auto Lymphocyte # : x  Auto Monocyte # : x  Auto Eosinophil # : x  Auto Basophil # : x  Auto Neutrophil % : x  Auto Lymphocyte % : x  Auto Monocyte % : x  Auto Eosinophil % : x  Auto Basophil % : x    09-04    139  |  104  |  72<H>  ----------------------------<  57<L>  3.9   |  21<L>  |  3.22<H>    Ca    7.7<L>      04 Sep 2019 06:39  Mg     1.9     09-03      PT/INR - ( 04 Sep 2019 06:39 )   PT: 30.0 sec;   INR: 2.62 ratio             09-01 @ 19:40  Hep A Igm Nonreact  Hep A total ab, IgA and M --  Hep B core Ab total --  Hep B core IgM Nonreact  Hep B DNA PCR --  Hep BSAg --  Hep BSAb --  Hep BSAb --  HCV Ab --  HCV RNA Log --  HCV RNA interp --                RADIOLOGY & ADDITIONAL STUDIES:
Patient is a 84y old  Male who presents with a chief complaint of fever, tarry stools (05 Sep 2019 08:30)      HPI:  84 y.o. Male with PMHx of ESRD on HD (MWF), HTN, RA on MTX and steroids, R AKA, Hx of severe CDI and megacolon, Severe PAD s/p LLE endarterectomy complicated by left groin infections s/p Tx and muscle flap, recent Dx of NSCLC not treated yet and planned for XRT this month sent from SNF due to fever up to 104F and black tarry stools this am as well as dry blood in mouth. Pt was lethargic day prior, c/o lower back pain. Found to have coagulopathy with INR>8, guaiac +, severe neutropenia, RUL/RLL infiltrates, increased in size RML mass. In ED pt was febrile, tachycardic and hypotensive, but rejected by intensivist for admission to MICU.  At the time of evaluation pt is more alert, shivering, c/o lower back pain with slightly improved BP. (01 Sep 2019 15:09)    history per patient and daughter.  Patient's tolerance diet has been poor, diet has been liberalized.  Bowel movement without blood.        PAST MEDICAL & SURGICAL HISTORY:  Rheumatoid arthritis  Above knee amputation of right lower extremity  Recurrent Clostridium difficile diarrhea  Diastolic CHF  Peripheral vascular disease  Afib  Anemia  CKD (chronic kidney disease)  COPD (chronic obstructive pulmonary disease)  SHAYY (obstructive sleep apnea)  Sepsis, due to unspecified organism: 2/2 poorly healing wounds b/l  Dyspepsia: On moderate exertion.  Sleep apnea, obstructive: Requires home 02 therapy, and treatment with BIPAP  Atelectasis  Pleural effusion, bilateral  Respiratory failure  Peripheral edema  CRI (chronic renal insufficiency)  Gout  Benign prostatic hypertrophy  Spinal stenosis  Hypercholesterolemia  GERD (gastroesophageal reflux disease)  CAD (coronary artery disease)  Hypertension  S/P angioplasty with stent  Cataract of left eye  Prostate: Surgery green light procedure.  S/P rotator cuff surgery: Right  S/P angioplasty      MEDICATIONS  (STANDING):  buDESOnide    Inhalation Suspension 0.5 milliGRAM(s) Inhalation two times a day  cefepime  Injectable. 1000 milliGRAM(s) IV Push daily  diltiazem    Tablet 60 milliGRAM(s) Oral every 6 hours  epoetin nelly Injectable 96301 Unit(s) IV Push <User Schedule>  finasteride 5 milliGRAM(s) Oral daily  fluticasone propionate 50 MICROgram(s)/spray Nasal Spray 1 Spray(s) Both Nostrils daily  nystatin    Suspension 512264 Unit(s) Swish and Swallow every 6 hours  pantoprazole  Injectable 40 milliGRAM(s) IV Push two times a day  vancomycin    Solution 125 milliGRAM(s) Oral every 6 hours    MEDICATIONS  (PRN):  acetaminophen   Tablet .. 650 milliGRAM(s) Oral every 6 hours PRN Temp greater or equal to 38C (100.4F), Mild Pain (1 - 3)  ALBUTerol    90 MICROgram(s) HFA Inhaler 2 Puff(s) Inhalation every 6 hours PRN Shortness of Breath and/or Wheezing  methocarbamol 500 milliGRAM(s) Oral every 8 hours PRN mild pain  oxyCODONE    5 mG/acetaminophen 325 mG 1 Tablet(s) Oral every 4 hours PRN Moderate Pain (4 - 6)      Allergies    Zosyn (Rash)    Intolerances        SOCIAL HISTORY:  NC  FAMILY HISTORY:  Family history of colorectal cancer  Family history of diabetes mellitus (Sibling)  Family history of hypertension      REVIEW OF SYSTEMS:    CONSTITUTIONAL: No weakness, fevers or chills  EYES/ENT: No visual changes;  No vertigo or throat pain   NECK: No pain or stiffness  RESPIRATORY: No cough, wheezing, hemoptysis; No shortness of breath  CARDIOVASCULAR: No chest pain or palpitations  GENITOURINARY: No dysuria, frequency or hematuria  NEUROLOGICAL: No numbness or weakness  SKIN: No itching, burning, rashes, or lesions   All other review of systems is negative unless indicated above.    Vital Signs Last 24 Hrs  T(C): 37.1 (05 Sep 2019 08:43), Max: 37.1 (05 Sep 2019 08:43)  T(F): 98.7 (05 Sep 2019 08:43), Max: 98.7 (05 Sep 2019 08:43)  HR: 89 (05 Sep 2019 07:00) (82 - 131)  BP: 97/57 (05 Sep 2019 03:00) (97/57 - 149/41)  BP(mean): 69 (05 Sep 2019 03:00) (62 - 74)  RR: 19 (05 Sep 2019 07:00) (16 - 29)  SpO2: 94% (05 Sep 2019 07:00) (84% - 100%)    PHYSICAL EXAM:    Constitutional: NAD, well-developed  HEENT: EOMI, throat clear  Neck: No LAD, supple  Respiratory: CTA and P  Cardiovascular: S1 and S2, RRR, no M  Gastrointestinal: BS+, soft, NT/ND, neg HSM,    Neurological: A/O x 3, no focal deficits  Psychiatric: Normal mood, normal affect  Skin: No rashes    LABS:  CBC Full  -  ( 05 Sep 2019 06:27 )  WBC Count : 0.33 K/uL  RBC Count : 3.09 M/uL  Hemoglobin : 8.8 g/dL  Hematocrit : 28.4 %  Platelet Count - Automated : 28 K/uL  Mean Cell Volume : 91.9 fl  Mean Cell Hemoglobin : 28.5 pg  Mean Cell Hemoglobin Concentration : 31.0 gm/dL  Auto Neutrophil # : x  Auto Lymphocyte # : x  Auto Monocyte # : x  Auto Eosinophil # : x  Auto Basophil # : x  Auto Neutrophil % : x  Auto Lymphocyte % : x  Auto Monocyte % : x  Auto Eosinophil % : x  Auto Basophil % : x    09-05    140  |  101  |  33<H>  ----------------------------<  122<H>  3.9   |  27  |  2.09<H>    Ca    7.9<L>      05 Sep 2019 06:27      PT/INR - ( 05 Sep 2019 06:27 )   PT: 24.7 sec;   INR: 2.17 ratio             09-01 @ 19:40  Hep A Igm Nonreact  Hep A total ab, IgA and M --  Hep B core Ab total --  Hep B core IgM Nonreact  Hep B DNA PCR --  Hep BSAg --  Hep BSAb --  Hep BSAb --  HCV Ab --  HCV RNA Log --  HCV RNA interp --                RADIOLOGY & ADDITIONAL STUDIES:
Patient is a 84y old  Male who presents with a chief complaint of fever, tarry stools (06 Sep 2019 13:11)      HPI:  84 y.o. Male with PMHx of ESRD on HD (MWF), HTN, RA on MTX and steroids, R AKA, Hx of severe CDI and megacolon, Severe PAD s/p LLE endarterectomy complicated by left groin infections s/p Tx and muscle flap, recent Dx of NSCLC not treated yet and planned for XRT this month sent from SNF due to fever up to 104F and black tarry stools this am as well as dry blood in mouth. Pt was lethargic day prior, c/o lower back pain. Found to have coagulopathy with INR>8, guaiac +, severe neutropenia, RUL/RLL infiltrates, increased in size RML mass. In ED pt was febrile, tachycardic and hypotensive, but rejected by intensivist for admission to MICU.  At the time of evaluation pt is more alert, shivering, c/o lower back pain with slightly improved BP. (01 Sep 2019 15:09)      patient fatigue, tolerating diet. Negative nausea vomiting. Negative chest pain or shortness of breath.    PAST MEDICAL & SURGICAL HISTORY:  Rheumatoid arthritis  Above knee amputation of right lower extremity  Recurrent Clostridium difficile diarrhea  Diastolic CHF  Peripheral vascular disease  Afib  Anemia  CKD (chronic kidney disease)  COPD (chronic obstructive pulmonary disease)  SHAYY (obstructive sleep apnea)  Sepsis, due to unspecified organism: 2/2 poorly healing wounds b/l  Dyspepsia: On moderate exertion.  Sleep apnea, obstructive: Requires home 02 therapy, and treatment with BIPAP  Atelectasis  Pleural effusion, bilateral  Respiratory failure  Peripheral edema  CRI (chronic renal insufficiency)  Gout  Benign prostatic hypertrophy  Spinal stenosis  Hypercholesterolemia  GERD (gastroesophageal reflux disease)  CAD (coronary artery disease)  Hypertension  S/P angioplasty with stent  Cataract of left eye  Prostate: Surgery green light procedure.  S/P rotator cuff surgery: Right  S/P angioplasty      MEDICATIONS  (STANDING):  acetaminophen   Tablet .. 650 milliGRAM(s) Oral every 12 hours  buDESOnide    Inhalation Suspension 0.5 milliGRAM(s) Inhalation two times a day  cefepime  Injectable. 1000 milliGRAM(s) IV Push daily  diltiazem    Tablet 60 milliGRAM(s) Oral every 6 hours  epoetin nelly Injectable 87915 Unit(s) IV Push <User Schedule>  finasteride 5 milliGRAM(s) Oral daily  fluticasone propionate 50 MICROgram(s)/spray Nasal Spray 1 Spray(s) Both Nostrils daily  lidocaine   Patch 1 Patch Transdermal daily  nystatin    Suspension 436882 Unit(s) Swish and Swallow every 6 hours  pantoprazole  Injectable 40 milliGRAM(s) IV Push two times a day  potassium phosphate / sodium phosphate powder 1 Packet(s) Oral two times a day  vancomycin    Solution 125 milliGRAM(s) Oral every 6 hours    MEDICATIONS  (PRN):  acetaminophen   Tablet .. 650 milliGRAM(s) Oral every 6 hours PRN Temp greater or equal to 38C (100.4F), Mild Pain (1 - 3)  ALBUTerol    90 MICROgram(s) HFA Inhaler 2 Puff(s) Inhalation every 6 hours PRN Shortness of Breath and/or Wheezing  oxyCODONE    5 mG/acetaminophen 325 mG 1 Tablet(s) Oral every 4 hours PRN Moderate Pain (4 - 6)      Allergies    Zosyn (Rash)    Intolerances        SOCIAL HISTORY:    FAMILY HISTORY:  Family history of colorectal cancer  Family history of diabetes mellitus (Sibling)  Family history of hypertension      REVIEW OF SYSTEMS:    CONSTITUTIONAL: No weakness, fevers or chills  EYES/ENT: No visual changes;  No vertigo or throat pain   NECK: No pain or stiffness  RESPIRATORY: No cough, wheezing, hemoptysis; No shortness of breath  CARDIOVASCULAR: No chest pain or palpitations  GENITOURINARY: No dysuria, frequency or hematuria  NEUROLOGICAL: No numbness or weakness  SKIN: No itching, burning, rashes, or lesions   All other review of systems is negative unless indicated above.    Vital Signs Last 24 Hrs  T(C): 38.2 (06 Sep 2019 16:40), Max: 38.2 (06 Sep 2019 16:40)  T(F): 100.8 (06 Sep 2019 16:40), Max: 100.8 (06 Sep 2019 16:40)  HR: 97 (06 Sep 2019 16:00) (83 - 110)  BP: 126/57 (06 Sep 2019 16:00) (106/51 - 140/71)  BP(mean): 76 (06 Sep 2019 16:00) (63 - 89)  RR: 16 (06 Sep 2019 16:00) (16 - 23)  SpO2: 93% (06 Sep 2019 16:00) (91% - 100%)    PHYSICAL EXAM:    Constitutional: NAD, well-developed  HEENT: EOMI, throat clear  Neck: No LAD, supple  Respiratory: CTA and P  Cardiovascular: S1 and S2, RRR, no M  Gastrointestinal: BS+, soft, NT/ND, neg HSM,  Extremities: No peripheral edema, neg clubing, cyanosis  Vascular: 2+ peripheral pulses  Neurological: A/O x 3, no focal deficits  Psychiatric: Normal mood, normal affect  Skin: No rashes    LABS:  CBC Full  -  ( 06 Sep 2019 07:10 )  WBC Count : 0.40 K/uL  RBC Count : 3.01 M/uL  Hemoglobin : 8.6 g/dL  Hematocrit : 27.0 %  Platelet Count - Automated : 17 K/uL  Mean Cell Volume : 89.7 fl  Mean Cell Hemoglobin : 28.6 pg  Mean Cell Hemoglobin Concentration : 31.9 gm/dL  Auto Neutrophil # : 0.13 K/uL  Auto Lymphocyte # : 0.11 K/uL  Auto Monocyte # : 0.02 K/uL  Auto Eosinophil # : 0.11 K/uL  Auto Basophil # : 0.00 K/uL  Auto Neutrophil % : 32.5 %  Auto Lymphocyte % : 27.5 %  Auto Monocyte % : 5.0 %  Auto Eosinophil % : 27.5 %  Auto Basophil % : 0.0 %    09-06    137  |  99  |  54<H>  ----------------------------<  134<H>  4.0   |  28  |  3.13<H>    Ca    8.0<L>      06 Sep 2019 07:10  Phos  1.7     09-06    TPro  x   /  Alb  1.6<L>  /  TBili  x   /  DBili  x   /  AST  x   /  ALT  x   /  AlkPhos  x   09-06    PT/INR - ( 06 Sep 2019 07:10 )   PT: 37.1 sec;   INR: 3.22 ratio             09-01 @ 19:40  Hep A Igm Nonreact  Hep A total ab, IgA and M --  Hep B core Ab total --  Hep B core IgM Nonreact  Hep B DNA PCR --  Hep BSAg --  Hep BSAb --  Hep BSAb --  HCV Ab --  HCV RNA Log --  HCV RNA interp --                RADIOLOGY & ADDITIONAL STUDIES:
Patient is a 84y old  Male who presents with a chief complaint of fever, tarry stools (08 Sep 2019 14:51)      HPI:  84 y.o. Male with PMHx of ESRD on HD (MWF), HTN, RA on MTX and steroids, R AKA, Hx of severe CDI and megacolon, Severe PAD s/p LLE endarterectomy complicated by left groin infections s/p Tx and muscle flap, recent Dx of NSCLC not treated yet and planned for XRT this month sent from [facility] due to fever up to 104F and black tarry stools this am as well as dry blood in mouth. Pt was lethargic day prior, c/o lower back pain. Found to have coagulopathy with INR>8, guaiac +, severe neutropenia, RUL/RLL infiltrates, increased in size RML mass. In ED pt was febrile, tachycardic and hypotensive, but rejected by intensivist for admission to MICU.  At the time of evaluation pt is more alert, shivering, c/o lower back pain with slightly improved BP. (01 Sep 2019 15:09)    comf and poor historian  in  with family   denies pain          PAST MEDICAL & SURGICAL HISTORY:  Rheumatoid arthritis  Above knee amputation of right lower extremity  Recurrent Clostridium difficile diarrhea  Diastolic CHF  Peripheral vascular disease  Afib  Anemia  CKD (chronic kidney disease)  COPD (chronic obstructive pulmonary disease)  SHAYY (obstructive sleep apnea)  Sepsis, due to unspecified organism: 2/2 poorly healing wounds b/l  Dyspepsia: On moderate exertion.  Sleep apnea, obstructive: Requires home 02 therapy, and treatment with BIPAP  Atelectasis  Pleural effusion, bilateral  Respiratory failure  Peripheral edema  CRI (chronic renal insufficiency)  Gout  Benign prostatic hypertrophy  Spinal stenosis  Hypercholesterolemia  GERD (gastroesophageal reflux disease)  CAD (coronary artery disease)  Hypertension  S/P angioplasty with stent  Cataract of left eye  Prostate: Surgery green light procedure.  S/P rotator cuff surgery: Right  S/P angioplasty      MEDICATIONS  (STANDING):  acetaminophen   Tablet .. 650 milliGRAM(s) Oral every 12 hours  buDESOnide    Inhalation Suspension 0.5 milliGRAM(s) Inhalation two times a day  cefepime  Injectable. 1000 milliGRAM(s) IV Push daily  diltiazem    Tablet 60 milliGRAM(s) Oral every 6 hours  epoetin nelly Injectable 88635 Unit(s) IV Push <User Schedule>  fentaNYL   Patch  12 MICROgram(s)/Hr 1 Patch Transdermal every 72 hours  filgrastim-sndz Injectable 480 MICROGram(s) SubCutaneous daily  finasteride 5 milliGRAM(s) Oral daily  fluticasone propionate 50 MICROgram(s)/spray Nasal Spray 1 Spray(s) Both Nostrils daily  leucovorin 50 milliGRAM(s) Oral every 8 hours  lidocaine   Patch 1 Patch Transdermal daily  nystatin    Suspension 007183 Unit(s) Swish and Swallow every 6 hours  pantoprazole  Injectable 40 milliGRAM(s) IV Push two times a day  vancomycin    Solution 125 milliGRAM(s) Oral every 6 hours    MEDICATIONS  (PRN):  acetaminophen   Tablet .. 650 milliGRAM(s) Oral every 6 hours PRN Temp greater or equal to 38C (100.4F), Mild Pain (1 - 3)  ALBUTerol    90 MICROgram(s) HFA Inhaler 2 Puff(s) Inhalation every 6 hours PRN Shortness of Breath and/or Wheezing      Allergies    Zosyn (Rash)    Intolerances        SOCIAL HISTORY:NC    FAMILY HISTORY:  Family history of colorectal cancer  Family history of diabetes mellitus (Sibling)  Family history of hypertension      REVIEW OF SYSTEMS:    ot reliable    Vital Signs Last 24 Hrs  T(C): 36.1 (09 Sep 2019 05:13), Max: 36.8 (09 Sep 2019 00:32)  T(F): 97 (09 Sep 2019 05:13), Max: 98.2 (09 Sep 2019 00:32)  HR: 93 (09 Sep 2019 05:13) (69 - 93)  BP: 130/40 (09 Sep 2019 05:42) (113/30 - 140/31)  BP(mean): --  RR: 20 (09 Sep 2019 00:32) (20 - 20)  SpO2: 97% (09 Sep 2019 05:13) (94% - 97%)    PHYSICAL EXAM:    Constitutional: NAD, well-developed, lethargic  HEENT: EOMI, throat clear  Neck: No LAD, supple  Respiratory: CTA and P  Cardiovascular: S1 and S2, RRR, no M  Gastrointestinal: BS+, soft, NT/ND, neg HSM,  Extremities: No peripheral edema, neg clubing, cyanosis  Vascular: 2+ peripheral pulses  Neurological: A/O x 3, no focal deficits  Psychiatric: Normal mood, normal affect  Skin: No rashes    LABS:  CBC Full  -  ( 08 Sep 2019 06:40 )  WBC Count : 0.58 K/uL  RBC Count : 3.19 M/uL  Hemoglobin : 9.2 g/dL  Hematocrit : 29.1 %  Platelet Count - Automated : 25 K/uL  Mean Cell Volume : 91.2 fl  Mean Cell Hemoglobin : 28.8 pg  Mean Cell Hemoglobin Concentration : 31.6 gm/dL  Auto Neutrophil # : 0.31 K/uL  Auto Lymphocyte # : 0.21 K/uL  Auto Monocyte # : 0.01 K/uL  Auto Eosinophil # : 0.04 K/uL  Auto Basophil # : 0.00 K/uL  Auto Neutrophil % : 52.0 %  Auto Lymphocyte % : 37.0 %  Auto Monocyte % : 2.0 %  Auto Eosinophil % : 7.0 %  Auto Basophil % : 0.0 %    09-08    140  |  100  |  58<H>  ----------------------------<  110<H>  4.5   |  29  |  3.19<H>    Ca    8.4<L>      08 Sep 2019 06:40      PT/INR - ( 08 Sep 2019 06:40 )   PT: 31.4 sec;   INR: 2.74 ratio             09-01 @ 19:40  Hep A Igm Nonreact  Hep A total ab, IgA and M --  Hep B core Ab total --  Hep B core IgM Nonreact  Hep B DNA PCR --  Hep BSAg --  Hep BSAb --  Hep BSAb --  HCV Ab --  HCV RNA Log --  HCV RNA interp --                RADIOLOGY & ADDITIONAL STUDIES:
Patient is a 84y old  Male who presents with a chief complaint of fever, tarry stools (09 Sep 2019 21:06)      HPI:  84 y.o. Male with PMHx of ESRD on HD (MWF), HTN, RA on MTX and steroids, R AKA, Hx of severe CDI and megacolon, Severe PAD s/p LLE endarterectomy complicated by left groin infections s/p Tx and muscle flap, recent Dx of NSCLC not treated yet and planned for XRT this month sent from SNF due to fever up to 104F and black tarry stools this am as well as dry blood in mouth. Pt was lethargic day prior, c/o lower back pain. Found to have coagulopathy with INR>8, guaiac +, severe neutropenia, RUL/RLL infiltrates, increased in size RML mass. In ED pt was febrile, tachycardic and hypotensive, but rejected by intensivist for admission to MICU.  At the time of evaluation pt is more alert, shivering, c/o lower back pain with slightly improved BP. (01 Sep 2019 15:09)      pt comf  family agreed DNR per famiy  dec po intake    PAST MEDICAL & SURGICAL HISTORY:  Rheumatoid arthritis  Above knee amputation of right lower extremity  Recurrent Clostridium difficile diarrhea  Diastolic CHF  Peripheral vascular disease  Afib  Anemia  CKD (chronic kidney disease)  COPD (chronic obstructive pulmonary disease)  SHAYY (obstructive sleep apnea)  Sepsis, due to unspecified organism: 2/2 poorly healing wounds b/l  Dyspepsia: On moderate exertion.  Sleep apnea, obstructive: Requires home 02 therapy, and treatment with BIPAP  Atelectasis  Pleural effusion, bilateral  Respiratory failure  Peripheral edema  CRI (chronic renal insufficiency)  Gout  Benign prostatic hypertrophy  Spinal stenosis  Hypercholesterolemia  GERD (gastroesophageal reflux disease)  CAD (coronary artery disease)  Hypertension  S/P angioplasty with stent  Cataract of left eye  Prostate: Surgery green light procedure.  S/P rotator cuff surgery: Right  S/P angioplasty      MEDICATIONS  (STANDING):  acetaminophen   Tablet .. 650 milliGRAM(s) Oral every 12 hours  cefepime  Injectable. 1000 milliGRAM(s) IV Push daily  diltiazem    Tablet 60 milliGRAM(s) Oral every 6 hours  epoetin nelly Injectable 12123 Unit(s) IV Push <User Schedule>  fentaNYL   Patch  12 MICROgram(s)/Hr 1 Patch Transdermal every 72 hours  filgrastim-sndz Injectable 480 MICROGram(s) SubCutaneous daily  finasteride 5 milliGRAM(s) Oral daily  fluconAZOLE IVPB      fluconAZOLE IVPB 100 milliGRAM(s) IV Intermittent every 24 hours  fluticasone propionate 50 MICROgram(s)/spray Nasal Spray 1 Spray(s) Both Nostrils daily  lidocaine   Patch 1 Patch Transdermal daily  pantoprazole  Injectable 40 milliGRAM(s) IV Push two times a day  vancomycin    Solution 125 milliGRAM(s) Oral every 6 hours    MEDICATIONS  (PRN):  acetaminophen   Tablet .. 650 milliGRAM(s) Oral every 6 hours PRN Temp greater or equal to 38C (100.4F), Mild Pain (1 - 3)  ALBUTerol    90 MICROgram(s) HFA Inhaler 2 Puff(s) Inhalation every 6 hours PRN Shortness of Breath and/or Wheezing  HYDROmorphone  Injectable 0.5 milliGRAM(s) IV Push every 4 hours PRN brekathrough pain or dyspnea      Allergies    Zosyn (Rash)    Intolerances        SOCIAL HISTORY:NC    FAMILY HISTORY:  Family history of colorectal cancer  Family history of diabetes mellitus (Sibling)  Family history of hypertension      REVIEW OF SYSTEMS:    not reliable    Vital Signs Last 24 Hrs  T(C): 37.1 (10 Sep 2019 05:09), Max: 37.1 (09 Sep 2019 10:10)  T(F): 98.8 (10 Sep 2019 05:09), Max: 98.8 (09 Sep 2019 10:10)  HR: 84 (10 Sep 2019 05:09) (70 - 104)  BP: 113/37 (10 Sep 2019 05:09) (98/67 - 132/44)  BP(mean): --  RR: 18 (10 Sep 2019 05:09) (17 - 18)  SpO2: 94% (10 Sep 2019 05:09) (94% - 94%)    PHYSICAL EXAM:    Constitutional: NAD, well-developed, lethargic  pos thrush  HEENT: EOMI, throat clear  Neck: No LAD, supple  Respiratory: CTA and P  Cardiovascular: S1 and S2, RRR, no M  Gastrointestinal: BS+, soft, NT/ND, neg HSM,  Extremities: No peripheral edema, neg clubing, cyanosis  Vascular: 2+ peripheral pulses  Neurological: A/O x 3, no focal deficits  Psychiatric: Normal mood, normal affect  Skin: No rashes    LABS:  CBC Full  -  ( 09 Sep 2019 10:10 )  WBC Count : 1.88 K/uL  RBC Count : 3.21 M/uL  Hemoglobin : 9.0 g/dL  Hematocrit : 28.8 %  Platelet Count - Automated : 34 K/uL  Mean Cell Volume : 89.7 fl  Mean Cell Hemoglobin : 28.0 pg  Mean Cell Hemoglobin Concentration : 31.3 gm/dL  Auto Neutrophil # : 1.28 K/uL  Auto Lymphocyte # : 0.38 K/uL  Auto Monocyte # : 0.21 K/uL  Auto Eosinophil # : 0.02 K/uL  Auto Basophil # : 0.00 K/uL  Auto Neutrophil % : 68.0 %  Auto Lymphocyte % : 20.0 %  Auto Monocyte % : 11.0 %  Auto Eosinophil % : 1.0 %  Auto Basophil % : 0.0 %    09-09    134<L>  |  98  |  75<H>  ----------------------------<  108<H>  4.7   |  27  |  4.19<H>    Ca    8.6      09 Sep 2019 10:10  Phos  3.6     09-09    TPro  x   /  Alb  1.7<L>  /  TBili  x   /  DBili  x   /  AST  x   /  ALT  x   /  AlkPhos  x   09-09    PT/INR - ( 10 Sep 2019 07:23 )   PT: 47.2 sec;   INR: 4.07 ratio             09-01 @ 19:40  Hep A Igm Nonreact  Hep A total ab, IgA and M --  Hep B core Ab total --  Hep B core IgM Nonreact  Hep B DNA PCR --  Hep BSAg --  Hep BSAb --  Hep BSAb --  HCV Ab --  HCV RNA Log --  HCV RNA interp --                RADIOLOGY & ADDITIONAL STUDIES:
Patient is a 84y old  Male who presents with a chief complaint of fever, tarry stools (10 Sep 2019 14:42)      HPI:  84 y.o. Male with PMHx of ESRD on HD (MWF), HTN, RA on MTX and steroids, R AKA, Hx of severe CDI and megacolon, Severe PAD s/p LLE endarterectomy complicated by left groin infections s/p Tx and muscle flap, recent Dx of NSCLC not treated yet and planned for XRT this month sent from SNF due to fever up to 104F and black tarry stools this am as well as dry blood in mouth. Pt was lethargic day prior, c/o lower back pain. Found to have coagulopathy with INR>8, guaiac +, severe neutropenia, RUL/RLL infiltrates, increased in size RML mass. In ED pt was febrile, tachycardic and hypotensive, but rejected by intensivist for admission to MICU.  At the time of evaluation pt is more alert, shivering, c/o lower back pain with slightly improved BP. (01 Sep 2019 15:09)    patient comfortable appearing. Had some back pain last night. Negative nausea or vomiting.  Has been having fatigue.  Family has moved on towards comfort measures.  Analgesics helped with pain    PAST MEDICAL & SURGICAL HISTORY:  Rheumatoid arthritis  Above knee amputation of right lower extremity  Recurrent Clostridium difficile diarrhea  Diastolic CHF  Peripheral vascular disease  Afib  Anemia  CKD (chronic kidney disease)  COPD (chronic obstructive pulmonary disease)  SHAYY (obstructive sleep apnea)  Sepsis, due to unspecified organism: 2/2 poorly healing wounds b/l  Dyspepsia: On moderate exertion.  Sleep apnea, obstructive: Requires home 02 therapy, and treatment with BIPAP  Atelectasis  Pleural effusion, bilateral  Respiratory failure  Peripheral edema  CRI (chronic renal insufficiency)  Gout  Benign prostatic hypertrophy  Spinal stenosis  Hypercholesterolemia  GERD (gastroesophageal reflux disease)  CAD (coronary artery disease)  Hypertension  S/P angioplasty with stent  Cataract of left eye  Prostate: Surgery green light procedure.  S/P rotator cuff surgery: Right  S/P angioplasty      MEDICATIONS  (STANDING):  acetaminophen   Tablet .. 650 milliGRAM(s) Oral every 12 hours  diltiazem    Tablet 60 milliGRAM(s) Oral every 6 hours  epoetin nelly Injectable 64665 Unit(s) IV Push <User Schedule>  fentaNYL   Patch  12 MICROgram(s)/Hr 1 Patch Transdermal every 72 hours  finasteride 5 milliGRAM(s) Oral daily  fluconAZOLE IVPB 100 milliGRAM(s) IV Intermittent every 48 hours  lidocaine   Patch 1 Patch Transdermal daily    MEDICATIONS  (PRN):  acetaminophen   Tablet .. 650 milliGRAM(s) Oral every 6 hours PRN Temp greater or equal to 38C (100.4F), Mild Pain (1 - 3)  ALBUTerol    90 MICROgram(s) HFA Inhaler 2 Puff(s) Inhalation every 6 hours PRN Shortness of Breath and/or Wheezing  HYDROmorphone  Injectable 0.2 milliGRAM(s) IV Push every 3 hours PRN moderate pain or dyspnea  HYDROmorphone  Injectable 0.5 milliGRAM(s) IV Push every 3 hours PRN severe pain or dyspnea  LORazepam   Injectable 0.25 milliGRAM(s) IV Push every 4 hours PRN anxiety or agitation      Allergies    Zosyn (Rash)    Intolerances        SOCIAL HISTORY:    FAMILY HISTORY:  Family history of colorectal cancer  Family history of diabetes mellitus (Sibling)  Family history of hypertension      REVIEW OF SYSTEMS:    not reliable    Vital Signs Last 24 Hrs  T(C): --  T(F): --  HR: --  BP: 139/20 (10 Sep 2019 18:01) (139/20 - 139/20)  BP(mean): --  RR: --  SpO2: --    PHYSICAL EXAM:    Constitutional: NAD, well-developed, lethargic  HEENT: EOMI, throat clear  Neck: No LAD, supple  Respiratory: CTA and P  Cardiovascular: S1 and S2, RRR, no M  Gastrointestinal: BS+, soft, NT/ND, neg HSM,  Extremities: No peripheral edema, neg clubing, cyanosis  Vascular: 2+ peripheral pulses  Neurological: A/O x 3, no focal deficits  Psychiatric: Normal mood, normal affect  Skin: No rashes    LABS:  CBC Full  -  ( 09 Sep 2019 10:10 )  WBC Count : 1.88 K/uL  RBC Count : 3.21 M/uL  Hemoglobin : 9.0 g/dL  Hematocrit : 28.8 %  Platelet Count - Automated : 34 K/uL  Mean Cell Volume : 89.7 fl  Mean Cell Hemoglobin : 28.0 pg  Mean Cell Hemoglobin Concentration : 31.3 gm/dL  Auto Neutrophil # : 1.28 K/uL  Auto Lymphocyte # : 0.38 K/uL  Auto Monocyte # : 0.21 K/uL  Auto Eosinophil # : 0.02 K/uL  Auto Basophil # : 0.00 K/uL  Auto Neutrophil % : 68.0 %  Auto Lymphocyte % : 20.0 %  Auto Monocyte % : 11.0 %  Auto Eosinophil % : 1.0 %  Auto Basophil % : 0.0 %    09-09    134<L>  |  98  |  75<H>  ----------------------------<  108<H>  4.7   |  27  |  4.19<H>    Ca    8.6      09 Sep 2019 10:10  Phos  3.6     09-09    TPro  x   /  Alb  1.7<L>  /  TBili  x   /  DBili  x   /  AST  x   /  ALT  x   /  AlkPhos  x   09-09    PT/INR - ( 10 Sep 2019 07:23 )   PT: 47.2 sec;   INR: 4.07 ratio             09-01 @ 19:40  Hep A Igm Nonreact  Hep A total ab, IgA and M --  Hep B core Ab total --  Hep B core IgM Nonreact  Hep B DNA PCR --  Hep BSAg --  Hep BSAb --  Hep BSAb --  HCV Ab --  HCV RNA Log --  HCV RNA interp --                RADIOLOGY & ADDITIONAL STUDIES:
second visit    seen on HD x 2 hours today    repeat K 6.1 - 1 K bath    check serum uric acid and LDH - r.o hemolysis    tolerating HD - transfuse PRBC's 2 units w HD    arrange for regular schedule HD Monday
CC - lethargic, pain        84 y.o. Male with PMHx of ESRD on HD (MWF), HTN, RA on MTX and steroids, R AKA, Hx of severe CDI and megacolon, Severe PAD s/p LLE endarterectomy complicated by left groin infections s/p Tx and muscle flap, recent Dx of NSCLC not treated yet and planned for XRT this month sent from SNF due to fever up to 104F and black tarry stools this am as well as dry blood in mouth. Pt was lethargic day prior, c/o lower back pain. Found to have coagulopathy with INR>8, guaiac +, severe neutropenia, RUL/RLL infiltrates, increased in size RML mass. In ED pt was febrile, tachycardic and hypotensive, adm for sepsis    9/11 - pt seen and examined, family at bedside, pt comfortable, encephalopathic, poor historian, all questions answered   9/12 - pt seen and examined, lethargic , no po intake, family at bedside, emotional support  provided, concern about right upper tooth being loose   9/13 - pt seen and examined, lethargic, open the eyes, was verbal in am with family , comfortable  9/14 - pt seen and examined, pain uncontrolled with q3 h timing of pain medications, family at bedside, pt minimally verbal , lethargic   9/15- pt seen and examined, comfortable, reports no dyspnea, no pain, tolerates minimal po intake, family at bedside   9/16 - pt seen and examined, denies pain, denies dyspnea, family at bedside, POC discussed in length , plan for hospice inpatient evaluation  T(C): --  T(F): --  HR: --  BP: 139/20 (09-10-19 @ 18:01) (139/20 - 139/20)  RR: --  SpO2: --  Wt(kg): --  HEAD:  Atraumatic, Normocephalic  EYES: EOMI, PERRLA, conjunctiva and sclera clear  HEENT: dry mucous membranes  NECK: Supple, No JVD  NERVOUS SYSTEM:  Alert & Oriented X1,  gen weakness, lethargic   CHEST/LUNG: occasional rhonchi, shalow breathing   HEART: Irregular rate and rhythm; No murmurs, rubs, or gallops  ABDOMEN: Soft, Nontender, Nondistended; Bowel sounds present  EXTREMITIES:  RT AKA  MUSCULOSKELETAL No muscle tenderness, Muscle tone normal, No joint tenderness, no Joint swelling, Joint range of motion-normal  SKIN- multiple skin hematomas; decub.ulcer  Neuro - lethargic, open the eyes, minimally verbal
Patient is a 84y old  Male who presents with a chief complaint of fever, tarry stools (01 Sep 2019 15:09)    Date of service: 09-05-19 @ 11:45      Patient lying in bed; fatigued; no diarrhea      ROS unable to obtain secondary to patient medical condition   MEDICATIONS  (STANDING):  buDESOnide    Inhalation Suspension 0.5 milliGRAM(s) Inhalation two times a day  cefepime  Injectable. 1000 milliGRAM(s) IV Push daily  diltiazem    Tablet 60 milliGRAM(s) Oral every 6 hours  epoetin nelly Injectable 23409 Unit(s) IV Push <User Schedule>  finasteride 5 milliGRAM(s) Oral daily  fluticasone propionate 50 MICROgram(s)/spray Nasal Spray 1 Spray(s) Both Nostrils daily  nystatin    Suspension 331789 Unit(s) Swish and Swallow every 6 hours  pantoprazole  Injectable 40 milliGRAM(s) IV Push two times a day  vancomycin    Solution 125 milliGRAM(s) Oral every 6 hours    MEDICATIONS  (PRN):  acetaminophen   Tablet .. 650 milliGRAM(s) Oral every 6 hours PRN Temp greater or equal to 38C (100.4F), Mild Pain (1 - 3)  ALBUTerol    90 MICROgram(s) HFA Inhaler 2 Puff(s) Inhalation every 6 hours PRN Shortness of Breath and/or Wheezing  methocarbamol 500 milliGRAM(s) Oral every 8 hours PRN mild pain  oxyCODONE    5 mG/acetaminophen 325 mG 1 Tablet(s) Oral every 4 hours PRN Moderate Pain (4 - 6)      Vital Signs Last 24 Hrs  T(C): 37.1 (05 Sep 2019 08:43), Max: 37.1 (05 Sep 2019 08:43)  T(F): 98.7 (05 Sep 2019 08:43), Max: 98.7 (05 Sep 2019 08:43)  HR: 89 (05 Sep 2019 11:00) (82 - 131)  BP: 113/43 (05 Sep 2019 11:00) (97/57 - 149/41)  BP(mean): 58 (05 Sep 2019 11:00) (58 - 74)  RR: 17 (05 Sep 2019 11:00) (14 - 29)  SpO2: 100% (05 Sep 2019 11:00) (84% - 100%)    Physical Exam:        PE:    Constitutional: frail looking  HEENT: NC/AT, EOMI, PERRLA, conjunctivae clear; ears and nose atraumatic; pharynx clear  Neck: supple; thyroid not palpable  Back: no tenderness  Respiratory: respiratory effort normal; scattered coarse breath sounds  Cardiovascular: S1S2 regular, no murmurs  Abdomen: soft, not tender, not distended, positive BS; no liver or spleen organomegaly  Genitourinary: no suprapubic tenderness  Musculoskeletal: no muscle tenderness, right upper extremity with vascular access; right AKA  Neurological/ Psychiatric:   moving all extremities  Skin: no rashes; no palpable lesions    Labs: all available labs reviewed                  Labs:           Labs:                        8.8    0.33  )-----------( 28       ( 05 Sep 2019 06:27 )             28.4     09-05    140  |  101  |  33<H>  ----------------------------<  122<H>  3.9   |  27  |  2.09<H>    Ca    7.9<L>      05 Sep 2019 06:27             Cultures:       Culture - Urine (collected 09-01-19 @ 11:27)  Source: .Urine Clean Catch (Midstream)  Final Report (09-02-19 @ 11:06):    <10,000 CFU/mL Normal Urogenital Layla    Culture - Blood (collected 09-01-19 @ 11:17)  Source: .Blood Blood-Peripheral  Gram Stain (09-02-19 @ 00:11):    Growth in aerobic bottle: Gram Negative Rods    Growth in anaerobic bottle: Gram Negative Rods  Final Report (09-03-19 @ 14:36):    Growth in aerobic and anaerobic bottles: Escherichia coli    "Due to technical problems, Proteus sp. will Not be reported as part of    the BCID panel until further notice"    ***Blood Panel PCR results on this specimen are available    approximately 3 hours after the Gram stain result.***    Gram stain, PCR, and/or culture results may not always    correspond due to difference in methodologies.    ************************************************************    This PCR assay was performed using [email or web address].    The following targets are tested for: Enterococcus,    vancomycin resistant enterococci, Listeria monocytogenes,    coagulase negative staphylococci, S. aureus,    methicillin resistant S. aureus, Streptococcus agalactiae    (Group B), S. pneumoniae, S.pyogenes (Group A),    Acinetobacter baumannii, Enterobacter cloacae, E. coli,    Klebsiella oxytoca, K. pneumoniae, Proteus sp.,    Serratia marcescens, Haemophilus influenzae,    Neisseria meningitidis, Pseudomonas aeruginosa, Candida    albicans, C. glabrata, C krusei, C parapsilosis,    C. tropicalis and the KPC resistance gene.  Organism: Blood Culture PCR  Escherichia coli (09-03-19 @ 14:36)  Organism: Escherichia coli (09-03-19 @ 14:36)      -  Amikacin: S <=16      -  Ampicillin: R >16 These ampicillin results predict results for amoxicillin      -  Ampicillin/Sulbactam: R >16/8 Enterobacter, Citrobacter, and Serratia may develop resistance during prolonged therapy (3-4 days)      -  Aztreonam: S <=4      -  Cefazolin: S <=2 Enterobacter, Citrobacter, and Serratia may develop resistance during prolonged therapy (3-4 days)      -  Cefepime: S <=2      -  Cefoxitin: S <=8      -  Ceftriaxone: S <=1 Enterobacter, Citrobacter, and Serratia may develop resistance during prolonged therapy      -  Ciprofloxacin: S <=1      -  Ertapenem: S <=0.5      -  Gentamicin: S <=2      -  Imipenem: S <=1      -  Levofloxacin: S <=2      -  Meropenem: S <=1      -  Piperacillin/Tazobactam: S <=8      -  Tobramycin: S <=2      -  Trimethoprim/Sulfamethoxazole: R >2/38      Method Type: DAVIDSON  Organism: Blood Culture PCR (09-03-19 @ 14:36)      -  Escherichia coli: Detec      Method Type: PCR    Culture - Blood (collected 09-01-19 @ 11:17)  Source: .Blood Blood-Peripheral  Gram Stain (09-02-19 @ 01:10):    Growth in aerobic and anaerobic bottles:    Gram Negative Rods  Final Report (09-03-19 @ 16:43):    Growth in aerobic and anaerobic bottles: Escherichia coli    See previous culture [id number]                < from: CT Abdomen and Pelvis No Cont (09.01.19 @ 12:12) >    EXAM:  CT ABDOMEN AND PELVIS                          EXAM:  CT CHEST                            *** ADDENDUM 09/01/2019  ***    This addendum is dated 9/1/2019 at 12:49 PM.    There is some edema in the right paracolic gutter and adjacent to the   mesentery of the ascending colon. There is no colonic wall thickening in   this region but the possibility of mild colitis involving the ascending   colon is considered.    Differential diagnosis for the appearance of the focal thickening of the   right lateral wall of the rectum is focal proctitis or tumor. Again   direct visualization is recommended when clinically feasible.      *** END OF ADDENDUM 09/01/2019  ***      PROCEDURE DATE:  09/01/2019          INTERPRETATION:  Clinical information:Sepsis and lung cancer, back pain   and GI bleed.    Comparison: PET/CT dated 6/4/2019    PROCEDURE:   CT of the Chest, abdomen and pelvis was performed without intravenous   contrast.  Oral contrast was not administered.        FINDINGS:    CHEST:    LUNG AND LARGE AIRWAYS: Interval development of opacities in the right   upper lobe posteriorly likely on the basis of pneumonia. The mass in the   right middle lobe measures 7.1 x 5.5 cm previously 6.4 x 4.5 cm. Interval   development of opacities in the right lower lobe, likely on the basis of   pneumonia. There is atelectasis involving the left lower lobe medially.   There is a small amount of atelectasis involving the lingula.  PLEURA: within normal limits.  VESSELS: Atherosclerotic changes in the aorta and coronary arteries  HEART: normal size. No pericardial effusion.  MEDIASTINUM AND [place]: within normal limits.  CHEST WALL AND LOWER NECK: There is a new subcutaneous nodule in the left   anterior chest wall measuring 1.1 cm. A mediallylocated left anterior   chest wall nodule measuring 8 mm is stable. A 4.6 cm left lobe thyroid   nodule is stable.    ABDOMEN:  LIVER: Calcified granuloma.  BILE DUCTS: normal caliber.  GALLBLADDER: Distended containing a gallstone.  PANCREAS: withinnormal limits.  SPLEEN: within normal limits.  ADRENALS: within normal limits.  KIDNEYS: Bilateral renal hypodensities measuring up to 4.3 cm on the   right. A 1.4 cm hyperattenuating lesion in the upper pole of the right   kidney is again noted and may represent hemorrhagic cyst.    PELVIS:  REPRODUCTIVE ORGANS: no pelvic masses.  URETERS: within normal limits.  BLADDER: within normal limits.      BOWEL: There is a metallic clip again noted within the gastric fundus.   The colon is distended containing stool and air. There is asymmetric wall   thickening of the right side of the rectum.  PERITONEUM: no ascites or free air, no fluid collection.  VESSELS: Atherosclerotic changes. There is an IVC filter.  RETROPERITONEUM: No enlarged retroperitoneal or pelvic nodes.  ABDOMINAL WALL: There are postsurgical changes in both inguinal regions   again noted, left greater than right.  BONES: There is a mild to moderate anterior wedging deformity of the T12   vertebral body.    IMPRESSION: Increasein size of the right middle lobe lung mass.    New opacities in the right upper lobe and right lower lobe likely on the   basis of pneumonia. Tumor infiltration not excluded.    Areas of atelectasis involving the left lower lobe and lingula now noted.    2 subcutaneous nodules left anterior chest wall one of which is new and   for which metastatic disease is in the differential diagnosis.    Distended colon containing stool and air.    Asymmetric wall thickening right lateral rectum. This could represent   focal proctitis, however, direct visualization is suggested when   clinically feasible.        ***Please see the addendum at the top of this report. It may contain   additional important information or changes.****      < end of copied text >        Radiology: all available radiological tests reviewed    Advanced directives addressed: full resuscitation

## 2019-09-16 NOTE — PROGRESS NOTE ADULT - ASSESSMENT
Assessment/Plan  84 y.o. Male with PMHx of ESRD on HD (MWF), HTN, RA on MTX and steroids, R AKA, Hx of severe CDI and megacolon, Severe PAD s/p LLE endarterectomy complicated by left groin infections s/p Tx and muscle flap, anemia of chronic dx s/p transfusions in the past, COPD/SHAYY, diastolic CHF, GERD, Gout, HLD, recent Dx of NSCLC not treated yet and planned for XRT this month sent from SNF due to fever up to 104F and black tarry stools this am as well as dry blood in mouth. Pt was lethargic day prior, c/o lower back pain. Found to have coagulopathy with INR>8, guaiac +, severe neutropenia, RUL/RLL infiltrates, increased in size RML mass. In ED pt was febrile, tachycardic and hypotensive, but rejected by intensivist for admission to MICU.  At the time of evaluation pt is more alert, shivering, c/o lower back pain with slightly improved BP.    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

## 2019-09-16 NOTE — DISCHARGE NOTE NURSING/CASE MANAGEMENT/SOCIAL WORK - PATIENT PORTAL LINK FT
You can access the FollowMyHealth Patient Portal offered by [facility] by registering at the following website: [email or web address]. By joining [facility]’s FollowMyHealth portal, you will also be able to view your health information using other applications (apps) compatible with our system.

## 2019-09-16 NOTE — PROGRESS NOTE ADULT - PROVIDER SPECIALTY LIST ADULT
Critical Care
Gastroenterology
Heme/Onc
Hospitalist
Infectious Disease
Nephrology
Palliative Care
Hospitalist
Infectious Disease
Gastroenterology
Heme/Onc
Hospitalist
Nephrology

## 2019-09-19 DIAGNOSIS — R07.89 OTHER CHEST PAIN: ICD-10-CM

## 2019-09-19 DIAGNOSIS — Z99.3 DEPENDENCE ON WHEELCHAIR: ICD-10-CM

## 2019-09-19 DIAGNOSIS — Z79.52 LONG TERM (CURRENT) USE OF SYSTEMIC STEROIDS: ICD-10-CM

## 2019-09-19 DIAGNOSIS — N18.6 END STAGE RENAL DISEASE: ICD-10-CM

## 2019-09-19 DIAGNOSIS — Z79.82 LONG TERM (CURRENT) USE OF ASPIRIN: ICD-10-CM

## 2019-09-19 DIAGNOSIS — C34.2 MALIGNANT NEOPLASM OF MIDDLE LOBE, BRONCHUS OR LUNG: ICD-10-CM

## 2019-09-19 DIAGNOSIS — D62 ACUTE POSTHEMORRHAGIC ANEMIA: ICD-10-CM

## 2019-09-19 DIAGNOSIS — M06.9 RHEUMATOID ARTHRITIS, UNSPECIFIED: ICD-10-CM

## 2019-09-19 DIAGNOSIS — L98.9 DISORDER OF THE SKIN AND SUBCUTANEOUS TISSUE, UNSPECIFIED: ICD-10-CM

## 2019-09-19 DIAGNOSIS — Z87.891 PERSONAL HISTORY OF NICOTINE DEPENDENCE: ICD-10-CM

## 2019-09-19 DIAGNOSIS — K12.1 OTHER FORMS OF STOMATITIS: ICD-10-CM

## 2019-09-19 DIAGNOSIS — Z51.5 ENCOUNTER FOR PALLIATIVE CARE: ICD-10-CM

## 2019-09-19 DIAGNOSIS — E78.00 PURE HYPERCHOLESTEROLEMIA, UNSPECIFIED: ICD-10-CM

## 2019-09-19 DIAGNOSIS — I13.2 HYPERTENSIVE HEART AND CHRONIC KIDNEY DISEASE WITH HEART FAILURE AND WITH STAGE 5 CHRONIC KIDNEY DISEASE, OR END STAGE RENAL DISEASE: ICD-10-CM

## 2019-09-19 DIAGNOSIS — E83.39 OTHER DISORDERS OF PHOSPHORUS METABOLISM: ICD-10-CM

## 2019-09-19 DIAGNOSIS — J18.8 OTHER PNEUMONIA, UNSPECIFIED ORGANISM: ICD-10-CM

## 2019-09-19 DIAGNOSIS — M48.00 SPINAL STENOSIS, SITE UNSPECIFIED: ICD-10-CM

## 2019-09-19 DIAGNOSIS — K92.2 GASTROINTESTINAL HEMORRHAGE, UNSPECIFIED: ICD-10-CM

## 2019-09-19 DIAGNOSIS — I50.32 CHRONIC DIASTOLIC (CONGESTIVE) HEART FAILURE: ICD-10-CM

## 2019-09-19 DIAGNOSIS — K08.89 OTHER SPECIFIED DISORDERS OF TEETH AND SUPPORTING STRUCTURES: ICD-10-CM

## 2019-09-19 DIAGNOSIS — I73.9 PERIPHERAL VASCULAR DISEASE, UNSPECIFIED: ICD-10-CM

## 2019-09-19 DIAGNOSIS — E43 UNSPECIFIED SEVERE PROTEIN-CALORIE MALNUTRITION: ICD-10-CM

## 2019-09-19 DIAGNOSIS — K63.81 DIEULAFOY LESION OF INTESTINE: ICD-10-CM

## 2019-09-19 DIAGNOSIS — K59.39 OTHER MEGACOLON: ICD-10-CM

## 2019-09-19 DIAGNOSIS — A41.51 SEPSIS DUE TO ESCHERICHIA COLI [E. COLI]: ICD-10-CM

## 2019-09-19 DIAGNOSIS — T14.8XXA OTHER INJURY OF UNSPECIFIED BODY REGION, INITIAL ENCOUNTER: ICD-10-CM

## 2019-09-19 DIAGNOSIS — G93.40 ENCEPHALOPATHY, UNSPECIFIED: ICD-10-CM

## 2019-09-19 DIAGNOSIS — I25.10 ATHEROSCLEROTIC HEART DISEASE OF NATIVE CORONARY ARTERY WITHOUT ANGINA PECTORIS: ICD-10-CM

## 2019-09-19 DIAGNOSIS — M10.9 GOUT, UNSPECIFIED: ICD-10-CM

## 2019-09-19 DIAGNOSIS — E87.5 HYPERKALEMIA: ICD-10-CM

## 2019-09-19 DIAGNOSIS — N40.0 BENIGN PROSTATIC HYPERPLASIA WITHOUT LOWER URINARY TRACT SYMPTOMS: ICD-10-CM

## 2019-09-19 DIAGNOSIS — L89.152 PRESSURE ULCER OF SACRAL REGION, STAGE 2: ICD-10-CM

## 2019-09-19 DIAGNOSIS — Z95.5 PRESENCE OF CORONARY ANGIOPLASTY IMPLANT AND GRAFT: ICD-10-CM

## 2019-09-19 DIAGNOSIS — M79.89 OTHER SPECIFIED SOFT TISSUE DISORDERS: ICD-10-CM

## 2019-09-19 DIAGNOSIS — R79.1 ABNORMAL COAGULATION PROFILE: ICD-10-CM
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
## 2019-09-19 DIAGNOSIS — J15.6 PNEUMONIA DUE TO OTHER GRAM-NEGATIVE BACTERIA: ICD-10-CM

## 2019-09-19 DIAGNOSIS — Z88.1 ALLERGY STATUS TO OTHER ANTIBIOTIC AGENTS STATUS: ICD-10-CM

## 2019-09-19 DIAGNOSIS — G47.33 OBSTRUCTIVE SLEEP APNEA (ADULT) (PEDIATRIC): ICD-10-CM

## 2019-09-19 DIAGNOSIS — D61.818 OTHER PANCYTOPENIA: ICD-10-CM

## 2019-09-19 DIAGNOSIS — Z88.0 ALLERGY STATUS TO PENICILLIN: ICD-10-CM

## 2019-09-19 DIAGNOSIS — B37.0 CANDIDAL STOMATITIS: ICD-10-CM

## 2019-09-19 DIAGNOSIS — Z79.01 LONG TERM (CURRENT) USE OF ANTICOAGULANTS: ICD-10-CM

## 2019-09-19 DIAGNOSIS — Z99.81 DEPENDENCE ON SUPPLEMENTAL OXYGEN: ICD-10-CM

## 2019-09-19 DIAGNOSIS — Z99.2 DEPENDENCE ON RENAL DIALYSIS: ICD-10-CM

## 2019-09-19 DIAGNOSIS — D63.8 ANEMIA IN OTHER CHRONIC DISEASES CLASSIFIED ELSEWHERE: ICD-10-CM

## 2019-09-19 DIAGNOSIS — Z89.611 ACQUIRED ABSENCE OF RIGHT LEG ABOVE KNEE: ICD-10-CM
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
## 2019-09-19 DIAGNOSIS — J44.0 CHRONIC OBSTRUCTIVE PULMONARY DISEASE WITH (ACUTE) LOWER RESPIRATORY INFECTION: ICD-10-CM

## 2019-09-19 DIAGNOSIS — Z66 DO NOT RESUSCITATE: ICD-10-CM

## 2019-10-15 NOTE — CONSULT NOTE ADULT - ASSESSMENT
187.3
83 year old male multiple medical issues  CAD with stents, ESRD on HD, pA Fib on LTA Coumadine  Severe PVD, presenting with chest discomfort  Patent vessels on angiogram  Unclear etiology of the pain probably microvascular  Admitt to CCU  Nephro consult for HD  Will add Ranexa
84 y/o M with with ARYA/CKD remains HD dependent with SSCP with ST elevation in infolateral pattern.  LHC, CK, troponin negative with now recurrance of CP worse on inspiration.    New onset of left> right wrist pain with limited ROM.    PLAN  - ESRD: hd today and will plan for 3x per hd this week and monitor response  - AOCD with hx of GI bleed epo with HD   - Chest pain with ST elevation: with recurrence of pain on ranexa ? pericarditis  DW Dr Contreras will plan for ECHO and will send out ESR etc to be drawn at HD today.

## 2019-12-30 NOTE — PROGRESS NOTE ADULT - PROBLEM SELECTOR PLAN 3
Date/Time Patient Seen:  		  Referring MD:   Data Reviewed	       Patient is a 61y old  Female who presents with a chief complaint of pneumothorax, colitis, UTI (29 Dec 2019 09:23)      Subjective/HPI     PAST MEDICAL & SURGICAL HISTORY:  Interstitial lung disease: on home o2 prn  NHL (non-Hodgkin's lymphoma): Agem 45 sp chemo/rt/stem cell  Transient cerebral ischemia, unspecified type  Mitral prolapse  Pulmonary disease  History of tonsillectomy  History of appendectomy        Medication list         MEDICATIONS  (STANDING):  cefTRIAXone   IVPB 1000 milliGRAM(s) IV Intermittent every 24 hours  enoxaparin Injectable 40 milliGRAM(s) SubCutaneous daily  influenza   Vaccine 0.5 milliLiter(s) IntraMuscular once  lactobacillus acidophilus 1 Tablet(s) Oral three times a day with meals  OXcarbazepine 300 milliGRAM(s) Oral two times a day  pantoprazole    Tablet 40 milliGRAM(s) Oral before breakfast  propranolol 20 milliGRAM(s) Oral three times a day  senna 2 Tablet(s) Oral at bedtime  tiotropium 18 MICROgram(s) Capsule 1 Capsule(s) Inhalation daily    MEDICATIONS  (PRN):  acetaminophen   Tablet .. 650 milliGRAM(s) Oral every 6 hours PRN Temp greater or equal to 38C (100.4F), Moderate Pain (4 - 6)  ALBUTerol    0.083% 2.5 milliGRAM(s) Nebulizer every 6 hours PRN Shortness of Breath and/or Wheezing  ondansetron Injectable 4 milliGRAM(s) IV Push every 8 hours PRN Nausea and/or Vomiting         Vitals log        ICU Vital Signs Last 24 Hrs  T(C): 36.5 (30 Dec 2019 04:57), Max: 36.7 (30 Dec 2019 00:08)  T(F): 97.7 (30 Dec 2019 04:57), Max: 98.1 (30 Dec 2019 00:08)  HR: 81 (30 Dec 2019 04:57) (71 - 101)  BP: 111/73 (30 Dec 2019 04:57) (91/61 - 139/88)  BP(mean): --  ABP: --  ABP(mean): --  RR: 18 (30 Dec 2019 04:57) (17 - 18)  SpO2: 97% (30 Dec 2019 04:57) (95% - 98%)           Input and Output:  I&O's Detail    29 Dec 2019 07:01  -  30 Dec 2019 07:00  --------------------------------------------------------  IN:  Total IN: 0 mL    OUT:    Voided: 200 mL  Total OUT: 200 mL    Total NET: -200 mL          Lab Data                        11.7   8.89  )-----------( 455      ( 30 Dec 2019 06:35 )             36.8     12-30    138  |  98  |  8   ----------------------------<  119<H>  3.7   |  34<H>  |  0.41<L>    Ca    9.0      30 Dec 2019 06:35    TPro  6.5  /  Alb  2.6<L>  /  TBili  0.3  /  DBili  x   /  AST  16  /  ALT  19  /  AlkPhos  106  12-29            Review of Systems	      Objective     Physical Examination    head at  heart s1s2  lung dec BS  abd soft  head nc  on o2 support      Pertinent Lab findings & Imaging      Shauna:  NO   Adequate UO     I&O's Detail    29 Dec 2019 07:01  -  30 Dec 2019 07:00  --------------------------------------------------------  IN:  Total IN: 0 mL    OUT:    Voided: 200 mL  Total OUT: 200 mL    Total NET: -200 mL               Discussed with:     Cultures:	        Radiology Pt w/ metabolic encephalopathy that has resolved. Likely 2/2 dilaudid   -ammonia levels wnl.   -no hypercapnia on ABGs  -Pt afebrile. Incision site looks c/d/i w/o drainage or pus making infectious less likely. Pt on appropriate abxs

## 2020-01-06 NOTE — PATIENT PROFILE ADULT - NSPROPTRIGHTBILLOFRIGHTS_GEN_A_NUR
PREOPERATIVE DIAGNOSIS: Retained IUD    POSTOPERATIVE DIAGNOSIS: Retained IUD normal endometrial cavity    PROCEDURE: Paracervical block, removal of IUD, diagnostic hysteroscopy    SURGEON:  Pretty Bañuelos MD    SURGICAL ASSISTANT: patient

## 2020-02-04 NOTE — PATIENT PROFILE ADULT - NSASFUNCLEVELADLTOILET_GEN_A_NUR
Refilled for 1 month, further refills require FUV, please inform patient.      3 = assistive equipment and person

## 2020-04-29 NOTE — PROGRESS NOTE ADULT - PROBLEM SELECTOR PLAN 2
Detail Level: Detailed Hide Additional Notes?: No Additional Notes (Optional): Provided patient Serica coupon (moisturizing scar formula) Pt w/ R and L lung mass seen on CT. R endobronchial mass enlarging from previous CT and the L lung mass is new. Likely cause of hemoptysis. Pt also with Thyroid mass increase suspicion for malignancy  -chest xray as above: PNA vs neoplasm vs artifact  -CT scan of chest as above  -Thyroid US results as above. Needs FNA. Can be outpatient  -pulmonary consult appreciated PET scan outpatient   -Thoracic surgery will do flexible bronchoscopy next week Monday  -will need HD, and stress dose steroids day of procedure +/- FFP if the INR is not <2 Pt w/ R and L lung mass seen on CT.   R endobronchial mass enlarging from previous CT and the L lung mass is new. Likely cause of hemoptysis.   Pt also with Thyroid mass increase suspicion for malignancy  chest xray as above: PNA vs neoplasm vs artifact  CT scan of chest as above  Thyroid US results as above. Needs FNA. Can be outpatient  pulmonary consult appreciated PET scan outpatient   Thoracic surgery will do flexible bronchoscopy on Monday 5/6/19  will need HD on Monday AM prior to procedure  Spoke to Dr. Gallegos and pt will have early HD with FFP if INR is not <2  INR < 2 start on unfractionated heparin gtt  Current INR is 3.37  Stress dose steroids day of procedure HC 100mg IV X 3 doses

## 2020-05-14 NOTE — PROGRESS NOTE ADULT - ASSESSMENT
----- Message from Kyrie Self MD sent at 5/13/2020  4:24 PM CDT -----  Thyroid is very weak as expected ensure off the medicine.  Resume 0.125 mg levothyroxine and will recheck in 6 weeks.   84-year-old gentleman with multiple comorbid disorders    Pancytopenia, likely associated with myelosuppression.    He may have had a component of bleeding as well, however, difficult to ascertain severity of this. Apparently, he had some bleeding that was noted in his oral cavity however, heart to tell where that came from. At this time, it appears to be stable and at the time of evaluation of bleeding, he had an elevated INR and low platelet count.    Hematology nodes appreciated.  Supportive measures for bleeding and control of INR.        Blood cells if hemoglobin is less than 8 and platelets supplement if patient is actively bleeding.    Gram-negative bacteremia, significant leukopenia. Would continue antibiotics.    I cannot rule out mild right-sided colitis but for now, we will monitor. He would be at high risk for endoscopic evaluation.  Discussed with daughter

## 2020-05-28 NOTE — PATIENT PROFILE ADULT. - PATIENT REPRESENTATIVE: ( YOU CAN CHOOSE ANY PERSON THAT CAN ASSIST YOU WITH YOUR HEALTH CARE PREFERENCES, DOES NOT HAVE TO BE A SPOUSE, IMMEDIATE FAMILY OR SIGNIFICANT OTHER/PARTNER)
Information could not be obtained Complex Repair And Rotation Flap Text: The defect edges were debeveled with a #15 scalpel blade.  The primary defect was closed partially with a complex linear closure.  Given the location of the remaining defect, shape of the defect and the proximity to free margins a rotation flap was deemed most appropriate for complete closure of the defect.  Using a sterile surgical marker, an appropriate advancement flap was drawn incorporating the defect and placing the expected incisions within the relaxed skin tension lines where possible.    The area thus outlined was incised deep to adipose tissue with a #15 scalpel blade.  The skin margins were undermined to an appropriate distance in all directions utilizing iris scissors. Yes

## 2020-08-03 NOTE — PROGRESS NOTE ADULT - PROBLEM SELECTOR PLAN 2
- inguinal wound culture shows mixed GNR, EN spp and corynebacterium spp  - on vancomycin 500 mg IV q12h and zosyn 3.375 gm IV q8h # 7  - S/P right groin wound debridement with wound vac in place  - Patient will require extensive debridement or AKA  - Discussed both options and long term goals with patient and daughter.  They are leaning towards AKA at this time. 19

## 2020-08-20 NOTE — INPATIENT CERTIFICATION FOR MEDICARE PATIENTS - THE STATUS OF COMORBIDITIES.
Cervical Epidural Injection    For certain types of neck pain, your doctor may suggest a cervical epidural injection. During this procedure, medicine is injected deep into your neck near your spine. The injection helps the doctor find the source of your pain. It can also help relieve your pain and soreness either temporarily or more permanently. However, it can be associated with serious complications.  The cervical vertebrae  The cervical vertebrae are the bones that support your neck and head. They form the top part of your spine. The tunnel made by these vertebrae is called the spinal canal. The spinal cord runs through the spinal canal, inside a sac called the dura. Nerves branch off the spinal cord and exit between the vertebrae. Pressure on one of these nerves may cause it to become inflamed (irritated and swollen). An inflamed nerve in your neck may cause neck pain, numbness, or weakness  that may also be felt in your head or arms.  The cervical epidural injection  In certain conditions, medication can be injected into the epidural space. This space surrounds the dura within the spinal canal. Using an anesthetic to reduce discomfort from the procedure, a needle is inserted between the bones of the neck. When the correct location is reached, the steroid treatment may be introduced. The injection is usually done with the help of imaging such as fluoroscopy. Care is taken to ensure a sterile procedure to reduce the chance of infection (which is rare, but can be very serious). The procedure is typically done by one of several types of specialists such as a neurosurgeon, a pain specialist, an interventional radiologist, or anesthesiologist.  Possible risks and complications  · Infection  · Spinal headaches  · Bleeding  · Nerve damage  · Spinal cord damage  · Prolonged increase in pain  Serious complications of various types have been reported. Talk with your doctor.   Date Last Reviewed: 10/19/2015  © 0534-9230  [employer]. [address] [place] [address], [place] [address]. All rights reserved. This information is not intended as a substitute for professional medical care. Always follow your healthcare professional's instructions.         2. The status of comorbities. (See ED/admit documents)

## 2020-08-24 NOTE — PROGRESS NOTE ADULT - ASSESSMENT
83y M with Sepsis 2/2 severe and recurrent Pseudomembranous Colitis, developed Renal Insufficiency HD dependent, and Acute Hypoxic Respiratory Failure 2/2 Fluid Overload from RF and HFpEF    D/C Planning EMMANUEL with HD, will f/u with Social Work I have reviewed and confirmed nurses' notes...

## 2020-10-23 NOTE — DISCHARGE NOTE ADULT - WEIGHT IN LBS
ANTICOAGULATION MANAGEMENT     Patient Name:  Stef Mosher  Date:  10/23/2020    ASSESSMENT /SUBJECTIVE:    Today's INR result of 2.9 is therapeutic. Goal INR of 2.0-3.0      Warfarin dose taken: Warfarin taken as instructed    Diet: No new diet changes affecting INR    Medication changes/ interactions: No new medications/supplements affecting INR    Previous INR: Therapeutic     S/S of bleeding or thromboembolism: No    New injury or illness: No    Upcoming surgery, procedure or cardioversion: No    Additional findings: None      PLAN:    Telephone call with Stef regarding INR result and instructed:     Warfarin Dosing Instructions: Continue your current warfarin dose 5 mg friday and 2.5 mg rest of days    Instructed patient to follow up no later than: 6 weeks  Lab visit scheduled    Education provided: Contact the anticoagulation clinic with any changes, questions or concerns at #[phone]       Stef verbalizes understanding and agrees to warfarin dosing plan.    Instructed to call the Anticoagulation Clinic for any changes, questions or concerns. (#[phone])        Lawanda Bowers RN      OBJECTIVE:  Recent labs: (last 7 days)     10/23/20  1055   INR 2.90*         INR assessment THER    Recheck INR In: 6 WEEKS    INR Location Clinic      Anticoagulation Summary  As of 10/23/2020    INR goal:  2.0-3.0   TTR:  65.2 % (1 y)   INR used for dosin.90 (10/23/2020)   Warfarin maintenance plan:  5 mg (2.5 mg x 2) every Fri; 2.5 mg (2.5 mg x 1) all other days   Full warfarin instructions:  5 mg every Fri; 2.5 mg all other days   Weekly warfarin total:  20 mg   No change documented:  Lawanda Bowers RN   Plan last modified:  Linnea Brooks RN (4/3/2020)   Next INR check:  2020   Priority:  Maintenance   Target end date:  Indefinite    Indications    Long-term (current) use of anticoagulants [Z79.01] [Z79.01]  New onset atrial fibrillation (H) [I48.91]  Chronic atrial fibrillation (H) [I48.20]              Anticoagulation Episode Summary     INR check location:      Preferred lab:      Send INR reminders to:  BRIANA RODRIGUEZ    Comments:        Anticoagulation Care Providers     Provider Role Specialty Phone number    Josephine Roberts NP Referring Nurse Practitioner - Family [phone]    Sivakumar Kang PA-C Responsible Physician Assistant [phone]       Left message on voicemail to call back [phone] and speak with nurse. . Lawanda Bowers RN  [phone]  ANTICOAGULATION FOLLOW-UP CLINIC VISIT    Patient Name:  Stef Mosher  Date:  10/23/2020  Contact Type:  Telephone    SUBJECTIVE:         OBJECTIVE    Recent labs: (last 7 days)     10/23/20  1055   INR 2.90*       ASSESSMENT / PLAN  INR assessment THER    Recheck INR In: 6 WEEKS    INR Location Clinic      Anticoagulation Summary  As of 10/23/2020    INR goal:  2.0-3.0   TTR:  65.2 % (1 y)   INR used for dosin.90 (10/23/2020)   Warfarin maintenance plan:  5 mg (2.5 mg x 2) every Fri; 2.5 mg (2.5 mg x 1) all other days   Full warfarin instructions:  5 mg every Fri; 2.5 mg all other days   Weekly warfarin total:  20 mg   No change documented:  Lawanda Bowers, RN   Plan last modified:  Linnea Brooks RN (4/3/2020)   Next INR check:  2020   Priority:  Maintenance   Target end date:  Indefinite    Indications    Long-term (current) use of anticoagulants [Z79.01] [Z79.01]  New onset atrial fibrillation (H) [I48.91]  Chronic atrial fibrillation (H) [I48.20]             Anticoagulation Episode Summary     INR check location:      Preferred lab:      Send INR reminders to:  BRIANA RODRIGUEZ    Comments:        Anticoagulation Care Providers     Provider Role Specialty Phone number    Josephine Roberts NP Referring Nurse Practitioner - Family [phone]    Sivakumar Kang PA-C Responsible Physician Assistant [phone]            See the Encounter Report to view Anticoagulation Flowsheet and Dosing Calendar (Go to Encounters tab in  chart review, and find the Anticoagulation Therapy Visit)    Dosage adjustment made based on physician directed care plan.    Lawanda Bowers RN                  177.2

## 2021-04-06 NOTE — INPATIENT CERTIFICATION FOR MEDICARE PATIENTS - NS ICMP CERT SIG IND
Call[facility] and [facility] see if [facility] was still needed. It is scanned into media, dated 2/12/21. I certify as stated above.

## 2021-04-06 NOTE — ED PROVIDER NOTE - LOCATION
Instructions: This plan will send the code FBSD to the PM system.  DO NOT or CHANGE the price. Detail Level: Simple Price (Do Not Change): 0.00 B/L legs and back

## 2021-04-21 NOTE — DISCHARGE NOTE ADULT - WEIGHT IN KG
Sex allowed/Do not drive or operate machinery/Showering allowed/Do not make important decisions/Stairs allowed/Walking - Indoors allowed/No heavy lifting/straining/Walking - Outdoors allowed
95.6

## 2021-05-18 NOTE — DISCHARGE NOTE PROVIDER - CARE PROVIDERS DIRECT ADDRESSES
,DirectAddress_Unknown Over distended uterus (polyhydramnios, macrosomia, multiple gestation)/Large for gestational age

## 2021-05-27 NOTE — SWALLOW BEDSIDE ASSESSMENT ADULT - H & P REVIEW
ANTICOAGULATION  MANAGEMENT    Assessment     Today's INR result of 3.68 is Supratherapeutic (goal INR of 2.0-3.0)        Previous INR was Supratherapeutic    Warfarin given as previously instructed    No new health/diet changes affecting INR    No new medication/supplements affecting INR    Continues to tolerate warfarin with no reported s/s of bleeding or thromboembolism       Plan:     Warfarin Dosing Orders:  Hold warfarin today only then change warfarin dose to 1.25 mg daily  (12.5 % change)    Next INR: 1 week    Telephone orders given to nurseTomy.  Orders read back correctly.     Vanita Ashton RN    Subjective/Objective:      Raymond Zheng, a 89 y.o. male is on warfarin. Facility nurse reports for [place]:    Other anticoagulants: No    Medication changes: No     Missed warfarin doses since last INR: No     Abnormal bleeding since last INR: No    New symptoms, injury or illness: No     Upcoming surgery, procedure or cardioversion: No    Recent INR Results:    Lab Results   Component Value Date    INR 3.68 (H) 04/05/2019    INR 4.59 (H) 03/29/2019    INR 2.46 (H) 03/01/2019       Anticoagulation Episode Summary     Current INR goal:   2.0-3.0   TTR:   85.4 % (4.3 y)   Next INR check:   4/12/2019   INR from last check:   3.68! (4/5/2019)   Weekly max warfarin dose:      Target end date:      INR check location:      Preferred lab:      Send INR reminders to:   ANTICOAGULATION POOL E (MEDICAL CARE FOR S[facility]IORS)    Indications    A-fib (H) (Resolved) [I48.91]           Comments:            Anticoagulation Care Providers     Provider Role Specialty Phone number    Delmi Regan NP Responsible Nurse Practitioner [phone]        
Incoming fax from [facility]    INR date: 4/5    See attached document  
yes

## 2021-06-02 NOTE — PHYSICAL THERAPY INITIAL EVALUATION ADULT - PREDICTED DURATION OF THERAPY (DAYS/WKS), PT EVAL
7 days Comment: Off hydroxychloroquine now, no lab monitoring. Last Ophtho visit 1/2021 WNL - see scan in [facility]

## 2021-07-29 NOTE — PROGRESS NOTE ADULT - PROBLEM SELECTOR PLAN 3
- U/S of L UE revealed thrombosis of superficial veins of left upper extremity  - Warm compresses on affected area. done

## 2021-09-07 NOTE — PATIENT PROFILE ADULT - AD AGENT PHONE NUMBER
[FreeTextEntry1] : *** 09/03/2021 ***\par \par MAURO NUNEZ is here for follow up.\par he did not have any seizures since last visit and he is more active.\par has indirect signs of depression.\par \par \par \par \par *** 04/30/2021 *** \par \par MAURO NUNEZ is here for follow up. he did not have any seizures\par continue to be mildly depressed. 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 continue to be depressed but no seizures. Tolerated well Xcopri\par \par *** 12/02/2020 *** \par \par MAURO NUNEZ is here for follow up.\par no seizures since dc\par continue to have depressed mood( medication SE?)\par \par Xcopri was approved but did not started as of yet due to high copay for increased dose ( at )\par \par \par *** 11/10/2020 ***\par \par MAURO NUNEZ is here for hospital follow up and for transition of care for his epilepsy.\par he was recently admitted in the hospital and his hospital course is as follow:\par \par 72y R-handed M with h/o, HTN, HLD and L. 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 breakthrough\par seizure. Last seizure 2 years prior. [facility] Pt with\par great improvement.\par \par EEG Summary: 10/30/20\par Abnormal EEG in the awake, drowsy and asleep states.\par spikes, focal, left anterior temporal region\par continuous polymorphic delta, focal, left temporal region\par \par \par he had severe postictal psychosis and was extremely agitated for 2 days.\par He is stable now and he did not have any seizures, but he feels depressed.\par \par he had tried several AEDs in the past and developed SE or they were not covered by his insurance.\par \par he failed Keppra low dose, Depakote, Dilantin, zonisamide, Tegretol, Vimpat, Aptiom.\par \par his levels in the hospital were subtherapeutic likely due to drug-drug interaction\par \par \par \par \par 
[phone]

## 2021-11-16 NOTE — INPATIENT CERTIFICATION FOR MEDICARE PATIENTS - THE SEVERITY OF SIGNS/SYMPTOMS. (SEE ED/ADMIT DOCUMENTS)
From: Xiomara Webster  To: CLAUDIO Francis  Sent: 11/14/2021 5:08 PM CST  Subject: Nikky and test strips    I got a text from [employer] on Saturday evening that     \" the order [id number] that I placed is on hold. they are requesting further documentation from my health care provider.\"     that is the same message that they sent me on Wednesday.  the original refill order went in on the phone on November 3rd.     the prescription should be for the One Touch Ultra Blue Test Strips. what you had sent in was \"One Touch Verio Test St(new)100's\". is that the same?    thank you for helping me with this problem.  mateus  
1. The severity of signs/symptoms.(See ED/admit documents)

## 2022-01-05 NOTE — PHYSICAL THERAPY INITIAL EVALUATION ADULT - LEVEL OF INDEPENDENCE: SCOOT/BRIDGE, REHAB EVAL
Dc instructions reviewed with pt. Aware of need to keep wound dressed/dry x 2 days, ok to chg in 3 days, wash/pat dry pain with anti biotic ointment, observe for s/s of infection, f/u with pcp for suture removal (8) in 10days   again c/o gluteal/buttocks discomfort due to ?diaper rash/moderate assist (50% patients effort)

## 2022-02-28 NOTE — ED ADULT NURSE NOTE - DOES PATIENT HAVE ADVANCE DIRECTIVE
Post Op Instructions:  Patient should Maintain Eye shield & Dressing until seen Wednesday at 8:30 am - 10th floor eye clinic  Tylenol as needed for general discomfort  Use Prescription for pain medication if pain is severe  Use Prescription for Nausea (Zofran) if nausea or vomiting  No excessive exercise   No Bending, Lifting or Straining  Call MD if significant pain or nausea / vomiting uncontrolled by medications  Call MD if temperature in excess of 101' F  In recovery room, be left side down.  Maintain Head in a Face-Down Position or right side down at home.  Return to eye clinic for Post Op Examination tomorrow Morning.  Bring Medicine bag to tomorrow's appointment.     No

## 2022-04-11 NOTE — ED ADULT NURSE NOTE - NSSISCREENINGQ1_ED_A_ED
Reilly Patel  INTERNAL MEDICINE  [facility], [address] [place] [address]  Phone: [phone]  Fax: [phone]  Follow Up Time: 1 week    Redd Delacruz)  Internal Medicine  [address] [place] [address]  Phone: [phone]  Fax: [phone]  Follow Up Time: Routine    Jerrod Dudley)  Gastroenterology; Internal Medicine  [address] [place] [address]  Phone: [phone]  Fax: [phone]  Follow Up Time: 1 month   No

## 2022-04-21 NOTE — ED ADULT NURSE NOTE - NURSING ED PRESSURE ULCER 1 STAGING
stage II Body Location Override (Optional - Billing Will Still Be Based On Selected Body Map Location If Applicable): right lateral superior abdomen Detail Level: Simple Depth Of Biopsy: dermis Was A Bandage Applied: Yes Size Of Lesion In Cm: 0 Biopsy Type: H and E Biopsy Method: Personna blade Anesthesia Type: 1% lidocaine with epinephrine Anesthesia Volume In Cc (Will Not Render If 0): 0.5 Hemostasis: Electrocautery Wound Care: Bacitracin Dressing: bandage Destruction After The Procedure: No Type Of Destruction Used: Curettage Curettage Text: The wound bed was treated with curettage after the biopsy was performed. Cryotherapy Text: The wound bed was treated with cryotherapy after the biopsy was performed. Electrodesiccation Text: The wound bed was treated with electrodesiccation after the biopsy was performed. Electrodesiccation And Curettage Text: The wound bed was treated with electrodesiccation and curettage after the biopsy was performed. Silver Nitrate Text: The wound bed was treated with silver nitrate after the biopsy was performed. Lab: [id number] [facility] Consent: Written consent was obtained and risks were reviewed including but not limited to scarring, infection, bleeding, scabbing, incomplete removal, nerve damage and allergy to anesthesia. Post-Care Instructions: I reviewed with the patient in detail post-care instructions. Patient is to keep the biopsy site dry overnight, and then apply bacitracin twice daily until healed. Patient may apply hydrogen peroxide soaks to remove any crusting. Notification Instructions: Patient will be notified of biopsy results. However, patient instructed to call the office if not contacted within 2 weeks. Billing Type: [employer] Information: Selecting Yes will display possible errors in your note based on the variables you have selected. This validation is only offered as a suggestion for you. PLEASE NOTE THAT THE VALIDATION TEXT WILL BE REMOVED WHEN YOU FINALIZE YOUR NOTE. IF YOU WANT TO FAX A PRELIMINARY NOTE YOU WILL NEED TO TOGGLE THIS TO 'NO' IF YOU DO NOT WANT IT IN YOUR FAXED NOTE. Lab: [id number] Billing Type: Third-Party Bill Body Location Override (Optional - Billing Will Still Be Based On Selected Body Map Location If Applicable): left lateral glute Body Location Override (Optional - Billing Will Still Be Based On Selected Body Map Location If Applicable): right inferior scapula Body Location Override (Optional - Billing Will Still Be Based On Selected Body Map Location If Applicable): right anterior thigh

## 2022-05-17 NOTE — H&P ADULT - REASON FOR ADMISSION
Worsening leg pain, anemia, ARYA sent from Rehab [Normal] : normal gait, coordination grossly intact, no focal deficits and deep tendon reflexes were 2+ and symmetric [Normal Affect] : the affect was normal [Normal Insight/Judgement] : insight and judgment were intact

## 2022-05-27 NOTE — PROVIDER CONTACT NOTE (CRITICAL VALUE NOTIFICATION) - PERSON GIVING RESULT:
"Chief Complaint   Patient presents with     New Patient     Hernia consult       Vitals:    05/27/22 1215   BP: (!) 144/81   BP Location: Left arm   Patient Position: Sitting   Cuff Size: Adult Large   Pulse: 92   SpO2: 97%   Weight: 111.9 kg (246 lb 12.8 oz)   Height: 1.803 m (5' 11\")       Body mass index is 34.42 kg/m .                          Saray James, EMT    " Lab

## 2022-06-07 NOTE — DISCHARGE NOTE ADULT - DISCHARGE DATE
"NEPHROLOGY PROGRESS NOTE------[facility]    [facility]  [phone]  Amara Cooper MD      Patient Care Team:  Deysi Mason APRN as PCP - General (Nurse Practitioner)  Eliseo Casarez MD as Consulting Physician (Nephrology)      Provider:  Amara Cooper MD  Patient Name: Chi Quinteros Sr.  :  1955    SUBJECTIVE:    F/U ARF/FELIBERTO/CRF/CKD    No complaints. Awaiting open heart surgery ? Thursday. No SOB, CP, dysuria.     Medication:  amLODIPine, 5 mg, Oral, Daily  arformoterol, 15 mcg, Nebulization, BID - RT  aspirin, 81 mg, Oral, Daily  atorvastatin, 40 mg, Oral, Daily  budesonide, 0.5 mg, Nebulization, BID - RT  carvedilol, 6.25 mg, Oral, BID With Meals  enoxaparin, 40 mg, Subcutaneous, Q12H  gabapentin, 600 mg, Oral, BID  guaiFENesin, 1,200 mg, Oral, Q12H  insulin lispro, 0-7 Units, Subcutaneous, TID AC  insulin regular, 2 Units, Subcutaneous, Q8H  ipratropium-albuterol, 3 mL, Nebulization, Q4H - RT  methylPREDNISolone sodium succinate, 40 mg, Intravenous, Q8H  multivitamin, 1 tablet, Oral, Daily  sodium chloride, 10 mL, Intravenous, Q12H  tamsulosin, 0.4 mg, Oral, Daily  thiamine, 100 mg, Oral, BID      Pharmacy Consult - Steroid Insulin Protocol,   Pharmacy to dose Trelegy,         OBJECTIVE    Vital Sign Min/Max for last 24 hours  Temp  Min: 97.3 °F (36.3 °C)  Max: 98.5 °F (36.9 °C)   BP  Min: 104/58  Max: 114/57   Pulse  Min: 60  Max: 80   Resp  Min: 16  Max: 18   SpO2  Min: 91 %  Max: 99 %   No data recorded   Weight  Min: 110 kg (241 lb 6.5 oz)  Max: 110 kg (241 lb 6.5 oz)     Flowsheet Rows    Flowsheet Row First Filed Value   Admission Height 177.8 cm (70\") Documented at 2022 2052   Admission Weight 104 kg (230 lb) Documented at 2022          No intake/output data recorded.  I/O last 3 completed shifts:  In: 240 [P.O.:240]  Out: 192 [Urine:1750; Stool:175]    Physical Exam:  General Appearance: alert, " appears stated age and cooperative  Head: normocephalic, without obvious abnormality and atraumatic  Eyes: conjunctivae and sclerae normal and no icterus  Neck: supple and no JVD  Lungs: clear to auscultation and respirations regular  Heart: regular rhythm & normal rate and normal S1, S2 +ANTONY  Chest: Wall no abnormalities observed  Abdomen: normal bowel sounds and soft non-tender  Extremities: moves extremities well, no edema, no cyanosis and no redness  Skin: no bleeding, bruising or rash, turgor normal, color normal and no lesions noted  Neurologic: Alert, and oriented. No focal deficits    Labs:    WBC WBC   Date Value Ref Range Status   06/07/2022 8.10 3.40 - 10.80 10*3/mm3 Final   06/05/2022 6.60 3.40 - 10.80 10*3/mm3 Final      HGB Hemoglobin   Date Value Ref Range Status   06/07/2022 13.2 13.0 - 17.7 g/dL Final   06/05/2022 12.5 (L) 13.0 - 17.7 g/dL Final      HCT Hematocrit   Date Value Ref Range Status   06/07/2022 39.3 37.5 - 51.0 % Final   06/05/2022 38.0 37.5 - 51.0 % Final      Platlets No results found for: LABPLAT   MCV MCV   Date Value Ref Range Status   06/07/2022 97.8 (H) 79.0 - 97.0 fL Final   06/05/2022 97.9 (H) 79.0 - 97.0 fL Final          Sodium Sodium   Date Value Ref Range Status   06/07/2022 134 (L) 136 - 145 mmol/L Final   06/05/2022 134 (L) 136 - 145 mmol/L Final   06/04/2022 136 136 - 145 mmol/L Final      Potassium Potassium   Date Value Ref Range Status   06/07/2022 4.8 3.5 - 5.2 mmol/L Final   06/05/2022 3.9 3.5 - 5.2 mmol/L Final   06/04/2022 4.0 3.5 - 5.2 mmol/L Final      Chloride Chloride   Date Value Ref Range Status   06/07/2022 98 98 - 107 mmol/L Final   06/05/2022 97 (L) 98 - 107 mmol/L Final   06/04/2022 99 98 - 107 mmol/L Final      CO2 CO2   Date Value Ref Range Status   06/07/2022 27.0 22.0 - 29.0 mmol/L Final   06/05/2022 27.0 22.0 - 29.0 mmol/L Final   06/04/2022 27.0 22.0 - 29.0 mmol/L Final      BUN BUN   Date Value Ref Range Status   06/07/2022 27 (H) 8 - 23 mg/dL  Final   06/05/2022 16 8 - 23 mg/dL Final   06/04/2022 18 8 - 23 mg/dL Final      Creatinine Creatinine   Date Value Ref Range Status   06/07/2022 0.89 0.76 - 1.27 mg/dL Final   06/05/2022 0.98 0.76 - 1.27 mg/dL Final   06/04/2022 1.02 0.76 - 1.27 mg/dL Final      Calcium Calcium   Date Value Ref Range Status   06/07/2022 8.8 8.6 - 10.5 mg/dL Final   06/05/2022 8.6 8.6 - 10.5 mg/dL Final   06/04/2022 8.4 (L) 8.6 - 10.5 mg/dL Final      PO4 No components found for: PO4   Albumin No results found for: ALBUMIN   Magnesium Magnesium   Date Value Ref Range Status   06/07/2022 2.3 1.6 - 2.4 mg/dL Final      Uric Acid No components found for: URIC ACID     Imaging Results (Last 72 Hours)     Procedure Component Value Units Date/Time    XR Chest 1 View [[id number]] Collected: 06/05/22 0958     Updated: 06/05/22 1002    Narrative:      EXAM: XR CHEST 1 VW-     DATE OF EXAM: 6/5/2022 8:58 AM     INDICATION: copd exac  cough; R55-Syncope and collapse; F10.920-Alcohol  use, unspecified with intoxication, uncomplicated; J96.01-Acute  respiratory failure with hypoxia; I95.1-Orthostatic hypotension;  R77.8-Other specified abnormalities of plasma proteins; N17.9-Acute  kidney failure, unspecified; J44.1-Chronic obstructive pulmonary disease  with (acute) exacerbation; R94.39-Abnormal result of other cardiovasc.       COMPARISONS: 5/31/2022      FINDINGS:     Emphysema is evident. No pneumothorax or pleural effusion. Mild  scattered atelectasis. No consolidation. Mediastinum appears unchanged,  no evidence of acute process.       Impression:         Emphysema and mild scattered atelectasis. No evidence of acute  cardiopulmonary process otherwise.     Electronically Signed By-Chad Car On:6/5/2022 10:00 AM  This report was finalized on [id number] by  Chad Car, .          Results for orders placed during the hospital encounter of 05/31/22    XR Chest 1 View    Narrative  EXAM: XR CHEST 1 VW-    DATE OF EXAM: 6/5/2022 8:58  AM    INDICATION: copd exac  cough; R55-Syncope and collapse; F10.920-Alcohol  use, unspecified with intoxication, uncomplicated; J96.01-Acute  respiratory failure with hypoxia; I95.1-Orthostatic hypotension;  R77.8-Other specified abnormalities of plasma proteins; N17.9-Acute  kidney failure, unspecified; J44.1-Chronic obstructive pulmonary disease  with (acute) exacerbation; R94.39-Abnormal result of other cardiovasc.    COMPARISONS: 5/31/2022    FINDINGS:    Emphysema is evident. No pneumothorax or pleural effusion. Mild  scattered atelectasis. No consolidation. Mediastinum appears unchanged,  no evidence of acute process.    Impression  Emphysema and mild scattered atelectasis. No evidence of acute  cardiopulmonary process otherwise.    Electronically Signed By-Chad Car On:6/5/2022 10:00 AM  This report was finalized on [id number] by  Chad Car, .      XR Chest 1 View    Narrative  Examination: XR CHEST 1 VW-    Date of Exam: 5/31/2022 9:03 PM    Indication: Chest pain protocol.    Comparison: None available.    Technique: Single radiographic view of the chest was obtained.    Findings:  There is no pneumothorax, pleural effusion or focal airspace  consolidation. Cardiomediastinal silhouette is unremarkable. Pulmonary  vasculature appears within normal limits. Regional bones appear grossly  intact.    Impression  No acute cardiopulmonary abnormality.    Electronically Signed By-Johann Dodge MD On:5/31/2022 9:29 PM  This report was finalized on [id number] by  Johann Dodge MD.      Results for orders placed during the hospital encounter of 05/31/22    Duplex Vein Mapping Lower Extremity - Bilateral CAR    Interpretation Summary  The greater saphenous veins are of satisfactory quality from the groin to the mid distal thigh bilaterally.  Distal to this the veins are small and inadequate.        ASSESSMENT / PLAN      COPD exacerbation (HCC)    Obesity (BMI 30-39.9)    Abnormal nuclear stress test     Acute renal insufficiency    Alcohol dependence (HCC)    Essential hypertension    Other emphysema (HCC)    Coronary artery disease    Syncope, unspecified syncope type    1. ARF/FELIBERTO------Nonoliguric. Resolved with volume repletion    2. CAD------No angina or gross overload. ? Surgery Friday    3. BENIGN ESSENTIAL HTN------BP okay. No ACE-I for now    4. BPH-------On Flomax    5. S/P SYNCOPE    6. ETOH ABUSE    7. HYPERLIPIDEMIA-------On Statin    8. DVT PROPHYLAXIS-------On Lovenox      Amara Cooper MD  [facility]  [phone]  06/07/22  07:56 EDT     23-May-2017

## 2022-07-02 NOTE — DISCHARGE NOTE ADULT - MEDICATION SUMMARY - MEDICATIONS TO CHANGE
I will SWITCH the dose or number of times a day I take the medications listed below when I get home from the hospital:  None
None known

## 2022-07-08 NOTE — PHYSICAL THERAPY INITIAL EVALUATION ADULT - LEVEL OF INDEPENDENCE: SUPINE/SIT, REHAB EVAL
supine to long sitting,completes lateral transfer bed to w/c using sliding board and CG-minAx2/moderate assist (50% patients effort)
contact guard
independent

## 2022-07-17 NOTE — PATIENT PROFILE ADULT. - ALCOHOL USE HISTORY SINGLE SELECT
--cardiac monitoring  --continue  --hold ASA and coumadin 2/2 hematoma  --discussed with cardiology     yes...

## 2022-07-26 NOTE — PROGRESS NOTE ADULT - PROBLEM SELECTOR PLAN 1
July 26, 2022       Garrett Hess DO  [address]  [facility] [address]  Via In Basket      Patient: Judah Jimenez   YOB: 2017   Date of Visit: 7/26/2022       Dear Dr. Hess:    Thank you for referring Judah Jimenez to me for evaluation. Below are my notes for this visit with him.    If you have questions, please do not hesitate to call me. I look forward to following your patient along with you.      Sincerely,        Madison Hayes DO        CC: No Recipients  Madison Hayes DO  7/26/2022 11:10 AM  Sign when Signing Visit  [facility]  Pediatric Pulmonology Clinic Visit  07/26/22    Judah Jimenez is a 5 year old old male accompanied by mother  Judah Jimenez has been referred the Division of Pulmonary Medicine at [facility]  by Garrett Hess DO  : No    History of Present Illness:  6 y/o M with history of asthma, wheezing with illnesses who presents for initial visit for asthma management.    Mom reports he has a history of cough and wheeze with illnesses for the last few years and uses albuterol 2 puffs with illnesses. He has not been hospitalized in the past. When well, he does not cough during daytime, overnight or with exercise. He receives about 1-2 steroid courses per year. No ED visits recently and no hospitalizations in the past for asthma. His triggers include illnesses and weather change. No other noisy breathing. No frequent illnesses or PNA in the past.    He does have seasonal allergies and uses allegra as needed. No eczema but has occasional dry patches. No snoring at night. No symptoms of dysphagia.    Sinus Infections: No  Ear Infections: No  Meningitis: No  Skin Infections: No  Antibiotic Courses: none  Hospital Admissions: none in the past    Review of Systems:    Review of Systems   All other systems reviewed and are negative.       Birth History:  FT, no complications  Past Medical  History:   Past Medical History:   Diagnosis Date   • Asthma       Surgical History: History reviewed. No pertinent surgical history.   Developmental History: Appropriate.  Immunizations: up to date  Flu Vaccine: No.  COVID vaccine: not yet.   Family History:  Mom - asthma - non compliant with daily medication  Maternal aunt - severe asthma  Number of siblings: None  Asthma: No Atopy:No CF: No Immunodeficiency: No GERD: No    Social History:  Currently resides with: mom. Mom is asthma educator at [employer] [facility] [employer].  School/:  Yes,  in fall  Pets: No  Smoke exposure: No    Medical Record Review  • CXR: 6/2018  FINDINGS:    Lungs:  Unremarkable.  No consolidation.    Pleural space:  Unremarkable.  No pneumothorax.    Heart/Mediastinum:  Normal.  Normal trachea.    Bones/joints:  Unremarkable.  IMPRESSION:    No acute cardiopulmonary abnormality identified.   F I N A L        Medications:  Current Outpatient Medications   Medication Sig Dispense Refill   • albuterol (ACCUNEB) 0.63 MG/3ML nebulizer solution Take 3 mLs by nebulization every 6 hours as needed for Wheezing or Shortness of Breath. 100 mL 0   • ferrous sulfate 300 (60 Fe) MG/5ML liquid Take 3.3 mLs by mouth daily. 99 mL 2   • albuterol (ProAir HFA) 108 (90 Base) MCG/ACT inhaler Inhale 2 puffs into the lungs every 4 hours as needed for Shortness of Breath or Wheezing (cough). 2 each 3   • Spacer/Aero-Holding Chambers (AeroChamber MV) Misc Use with Inhaler 2 each 1   • albuterol (VENTOLIN) (2.5 MG/3ML) 0.083% nebulizer solution Take 3 mLs by nebulization every 6 hours as needed for Wheezing or Shortness of Breath (cough). For 2 weeks 75 mL 1     No current facility-administered medications for this visit.        Allergies:  ALLERGIES:  No Known Allergies     Physical Exam  Visit Vitals  /57 (BP Location: RUE - Right upper extremity, Patient Position: Sitting, Cuff Size: Pediatric)   Pulse 91   Temp 97 °F (36.1 °C) (Temporal)   Resp 22   Ht (!) 9'  2\" (2.794 m)   Wt 19 kg (41 lb 14.2 oz)   SpO2 100%   BMI 2.43 kg/m²     Physical Exam  Vitals reviewed.   Constitutional:       General: He is active.   HENT:      Head: Normocephalic.      Right Ear: Tympanic membrane normal.      Left Ear: Tympanic membrane normal.      Nose: Nose normal.   Eyes:      Extraocular Movements: Extraocular movements intact.      Pupils: Pupils are equal, round, and reactive to light.   Cardiovascular:      Rate and Rhythm: Normal rate and regular rhythm.      Pulses: Normal pulses.      Heart sounds: Normal heart sounds.   Pulmonary:      Effort: Pulmonary effort is normal. No respiratory distress or retractions.      Breath sounds: Normal breath sounds. No wheezing.   Abdominal:      General: Abdomen is flat.      Palpations: Abdomen is soft.   Musculoskeletal:         General: Normal range of motion.   Skin:     General: Skin is warm and dry.   Neurological:      General: No focal deficit present.      Mental Status: He is alert.          PFT interpretation:  Pulmonary Function Testing 7/26/2022  FEV1:            0.89L         89%  FVC:              0.92L         84%  FEV1/FVC:    97%  MGI46-59:     1.13L/s    Normal Spirometry and Flow Volume Loops    Assessment:  Judah is a 4 y/o M with history of asthma and seasonal allergies who presents for initial visit for asthma management. His asthma is mild intermittent due to infrequent symptoms. We will continue albuterol use as needed and adjust if requiring frequent steroids or symptoms change.    PLAN:   · Rescue: ProAir HFA/MDI 4 puffs q4-6 hours as needed.  · Equipment Education: Use with spacer and mask.  · Asthma Education: Reviewed.  · Asthma Action Plan: Reviewed.  · Allergic Rhinitis: Claritin/Zyrtec or Allegra     Follow up: 3 months .    Madison Hayes DO  Pediatric Pulmonology  [facility]  [facility]             - HH stable  - Likely Anemia of Chronic Disease  - No EGD - patient is high risk  - S/p 1u PRBC

## 2022-08-06 NOTE — ED ADULT TRIAGE NOTE - SPO2 (%)
LACTATION NOTE - INFANT    Evaluation Type  Evaluation Type: Inpatient    Problems & Assessment  Problems Diagnosed or Identified: Shallow latch  Infant Assessment: Hunger cues present  Muscle tone: Appropriate for GA    Feeding Assessment  Summary Current Feeding: Adlib;Breastfeeding exclusively  Breastfeeding Assessment: Assisted with breastfeeding w/mother's permission;Deep latch achieved and observed; Coordinated suck/swallow; Tolerated feeding well  Breastfeeding Positions: cross cradle;left breast  Latch: Grasps breast, tongue down, lips flanged, rhythmic sucking  Audible Sucks/Swallows: Spontaneous and intermittent (24 hours old)  Type of Nipple: Everted (after stimulation)  Comfort (Breast/Nipple): Filling, red/small blisters/bruises, mild/mod discomfort  Hold (Positioning): Full assist, teach one side, mother does other, staff holds  DEPL CENTER Score: 8
This note was copied from the mother's chart. LACTATION NOTE - MOTHER      Evaluation Type: Inpatient    Problems identified  Problems identified: Knowledge deficit         Breastfeeding goal  Breastfeeding goal: To maintain breast milk feeding per patient goal    Maternal Assessment  Bilateral Breasts: Soft;Symmetrical  Bilateral Nipples: Everted; Sore  Prior breastfeeding experience (comment below): Primip  Breastfeeding Assistance: Breastfeeding assistance provided with permission    Pain assessment  Pain, additional: Pinching  Treatment of Sore Nipples: Deeper latch techniques; Lanolin    Guidelines for use of: Other (comment): Mom independently BF, took a prenatal BF class and appears confident however experiences some pinching when latching baby. Demonstrated using pillows to support baby at level of breast, observing for flanged lips, and deep latch technique.  Educated on  BF behavior, STS, and using stimulation to maintain infant active at breast.
96

## 2022-09-20 NOTE — PROVIDER CONTACT NOTE (OTHER) - REASON
Consult Kat Montilla Patient Age: 47 year old  MESSAGE: Interpreting service used: No    Insurance on file confirmed with caller: Yes    IM/FP- Orders- Order Request-      Type of order being requested: Labs-        Type of lab:  Routine Labs    Reason order is needed: Annual Complete Exam     Appointment on 12.1.2022    Is the patient currently having any symptoms? No- Route message to provider's clinical pool.              Message read back to caller for accuracy: Yes       ALLERGIES:  Opioid analgesics  Current Outpatient Medications   Medication Sig Dispense Refill   • Calcium Acetate, Phos Binder, (CALCIUM ACETATE PO)      • VITAMIN D, ERGOCALCIFEROL, PO      • Cranberry 1000 MG Cap      • Multiple Vitamins-Minerals (WOMENS MULTI) Cap        No current facility-administered medications for this visit.     PHARMACY to use: na            Pharmacy preference(s) on file:   [id number] - [place], [place] - [address] AT [facility] [address]  [address] [address]  Phone: [phone] Fax: [phone]      CALL BACK INFO: Ok to leave response (including medical information) on answering machine      PCP: Osman García MD         INS: Payor: BLUE CROSS BLUE [employer]ELD [place] / Plan: PPO [id number] / Product Type: PPO MISC   PATIENT ADDRESS:  [place] [address]

## 2022-09-22 NOTE — ADVANCED PRACTICE NURSE CONSULT - ASSESSMENT
Recieved order to place PICC line for long-term IV abx. Reviewed chart, labwork, explained all risks and benefits and obtained consent for PICC placement. Pt A&Ox3 and verified pt's identification, name, , and correct procedure. Inserted Navilyst PICC 4FS with PASV technology via ultrasound guidance to Right Cephalic, number of insertion attempts:1, cut to 43 cm length, brisk blood return, flushes well with 20ml NS. Site prepped with CHG, draped in sterile fashion using strict aseptic technique maintained throughout procedure, performed hand hygiene, all team members maintained full barrier precautions, 1% lidocaine used subdermally,with minimal blood loss. Site covered with sterile dressing and dated. No complications. End cap placed. Pt tolerated well. All sharps and guidewires accounted for. CXR shows PICC line not in position, IR will evaluate. No pneumothorax. Lot # [id number]. Report given to district nurse. Recieved order to place PICC line for long-term IV abx. Reviewed chart, labwork, explained all risks and benefits and obtained consent for PICC placement. Pt A&Ox3 and verified pt's identification, name, , and correct procedure. Inserted Navilyst PICC 4FS with PASV technology via ultrasound guidance to Right Cephalic, number of insertion attempts:1, cut to 43 cm length, brisk blood return, flushes well with 20ml NS. Site prepped with CHG, draped in sterile fashion using strict aseptic technique maintained throughout procedure, performed hand hygiene, all team members maintained full barrier precautions, 1% lidocaine used subdermally,with minimal blood loss. Site covered with sterile dressing and dated. No complications. End cap placed. Pt tolerated well. All sharps and guidewires accounted for. CXR shows PICC line not in position, IR will evaluate. No pneumothorax. Lot # [id number]. Report given to district nurse.   Addendum: spoke to radiology, they are unable to reposition line today, Dr. Locke advised to pull PICC line, and will re evaluate in AM no

## 2023-01-01 NOTE — DISCHARGE NOTE ADULT - BECAUSE OF A PHYSICAL, MENTAL OR EMOTIONAL CONDITION, DO YOU HAVE DIFFICULTY DOING  ERRANDS ALONE LIKE VISITING A DOCTOR'S OFFICE OR SHOPPING (15 YEARS AND OLDER)
Added mom as proxy for access to chart.   
Spoke with mom she reports projectile vomiting started since appt on 10-2. He has been taking famotidine since 8-30. He was on it 3 times daily and seemed to improve dose was decreased to BID last week, 40mg/5 ml dose is 0.5 ml. This was done at PCP recommendation if he showed improvement.  Suggwested mom incread to TID dosing has appointmetn with Dr. Bolaños on 10-16. Esther update her to confirm appropriate. Mom reports she has tried to sign him up for [employer] and got a response that he was too uyoung will find out how to send link to get him signed up.   
No

## 2023-01-01 NOTE — PROGRESS NOTE ADULT - PROBLEM SELECTOR PLAN 4
- NSR now  - would get TTE to evaluate LVEF
Patient currently on nebs and nocturnal Bipap without respiratory complaint  ABG wnl  continue current management  Pulm input appreciated
Statement Selected

## 2023-01-10 NOTE — PROGRESS NOTE ADULT - SUBJECTIVE AND OBJECTIVE BOX
See other message sent to Dr Santos today   She is needing a refill on the Albuterol DuoNeb as the medication she has at home is      Medication is on the historical med list    Cardiology Progress Note  Patient is a 83y old  Male who presents with a chief complaint of complain of diarrhea, fever.     HPI: Pt is a 84 y/o M w/pmhx of Afib, CHF, CAD s/p stents, COPD, PNA, upper GI bleed (duodenal)  BIB EMS from [facility] assisted living for fever, abd pain, and diarrhea that began 3 weeks ago. Was in the hospital for PNA tx and was later dx with C-diff and started on a course of PO vancomycin for 10 days for the C-diff. States that he has had diarrhea since before the course of abx. Pain has been increased for the past few weeks and is characterized as a cramping feeling. Daughters also note that there is increased distension.     7/10- pt seen and examined today am.  Daughter at bedside.     7/11- pt seen and examined by me today.  He denies any symptoms. Much alert today. NG tube in place./  Daughter at bedside.    7/16- Case d/w nursing, pt and daughter. Rectal tube placed. No CP/SOB. Continues to have diarrhea but mildly improved.     7/17- Continues to have rectal/abd pain. No CP/SOB. S/p permacath placement yesterday as well.     7/18- No CP/SOB. Still weak, but feels slightly better today. No fevers. Case d/w daughter as well. SR on tele.     7/20- Sinus tach in 110s now- cardizem was held prior. -120. Feels tired, weak. No CP/SOB.     7/23- Chart reviewed. No CP/SOB. Awaiting paracentesis today. PVCs, SR on tele.     PAST MEDICAL & SURGICAL HISTORY:  Diastolic CHF  Peripheral vascular disease  Afib  Anemia  CKD (chronic kidney disease)  COPD (chronic obstructive pulmonary disease)  SHAYY (obstructive sleep apnea)  Sepsis, due to unspecified organism: 2/2 poorly healing wounds b/l  Dyspepsia: On moderate exertion.  Sleep apnea, obstructive: Requires home 02 therapy, and treatment with BIPAP  Atelectasis  Pleural effusion, bilateral  Respiratory failure  Peripheral edema  CRI (chronic renal insufficiency)  Gout  Benign prostatic hypertrophy  Spinal stenosis  Hypercholesterolemia  GERD (gastroesophageal reflux disease)  CAD (coronary artery disease)  Hypertension  S/P angioplasty with stent  Cataract of left eye  Prostate: Surgery green light procedure.  S/P rotator cuff surgery: Right  S/P angioplasty      MEDICATIONS  (STANDING):  allopurinol 100 milliGRAM(s) Oral daily  buDESOnide   0.5 milliGRAM(s) Respule 0.5 milliGRAM(s) Inhalation two times a day  dextrose 5% 1000 milliLiter(s) (75 mL/Hr) IV Continuous <Continuous>  diltiazem    Tablet 30 milliGRAM(s) Oral every 6 hours  doxazosin 8 milliGRAM(s) Oral at bedtime  ferrous    sulfate 325 milliGRAM(s) Oral daily  finasteride 5 milliGRAM(s) Oral daily  hydrALAZINE 50 milliGRAM(s) Oral two times a day  isosorbide   mononitrate ER Tablet (IMDUR) 120 milliGRAM(s) Oral daily  metoprolol tartrate 25 milliGRAM(s) Oral two times a day  metroNIDAZOLE  IVPB      metroNIDAZOLE  IVPB 500 milliGRAM(s) IV Intermittent every 8 hours  simvastatin 10 milliGRAM(s) Oral at bedtime  vancomycin    Solution 500 milliGRAM(s) Oral every 6 hours    MEDICATIONS  (PRN):  acetaminophen   Tablet 650 milliGRAM(s) Oral every 8 hours PRN For Temp greater than 38 C (100.4 F)  acetaminophen   Tablet. 650 milliGRAM(s) Oral every 8 hours PRN Mild Pain (1 - 3)  ALBUTerol   0.5% 2.5 milliGRAM(s) Nebulizer every 4 hours PRN Shortness of Breath and/or Wheezing  morphine  - Injectable 2 milliGRAM(s) IV Push every 6 hours PRN Severe Pain (7 - 10)  ondansetron Injectable 4 milliGRAM(s) IV Push every 6 hours PRN Nausea and/or Vomiting    FAMILY HISTORY: No pertinent family history in first degree relatives    SOCIAL HISTORY: as above.    REVIEW OF SYSTEMS:  CONSTITUTIONAL:    No fatigue, malaise, lethargy.  No fever or chills.  HEENT:  Eyes:  No visual changes.     ENT:  No epistaxis.  No sinus pain.    RESPIRATORY:  No cough.  No wheeze.  No hemoptysis.  No shortness of breath.  CARDIOVASCULAR:  No chest pains.  No palpitations. No shortness of breath, No orthopnea or PND.  GASTROINTESTINAL:  no abdominal pain.  No nausea or vomiting.    GENITOURINARY:    No hematuria.    MUSCULOSKELETAL:  No musculoskeletal pain.  No joint swelling.  No arthritis.  NEUROLOGICAL:  No tingling or numbness or weakness.  PSYCHIATRIC:  No confusion    Vital Signs Last 24 Hrs  T(C): 36.4 (09 Jul 2018 04:00), Max: 36.7 (08 Jul 2018 11:40)  T(F): 97.6 (09 Jul 2018 04:00), Max: 98.1 (08 Jul 2018 11:40)  HR: 66 (09 Jul 2018 07:40) (66 - 80)  BP: 115/23 (09 Jul 2018 04:00) (109/28 - 137/33)  BP(mean): --  RR: 16 (09 Jul 2018 04:00) (16 - 18)  SpO2: 93% (09 Jul 2018 04:00) (91% - 95%)    PHYSICAL EXAM-    Constitutional: ill looking elderly male in no acute distress.     Head: Head is normocephalic and atraumatic.      Neck: NG tube in place    Cardiovascular: Regular rate and rhythm without S3, S4. No murmurs or rubs are appreciated.      Respiratory: Breath sounds are normal. No rales. No wheezing.    Abdomen: Soft, nontender, distended with positive bowel sounds.      Extremity: No tenderness. No  pitting edema     Neurologic: The patient is alert and oriented.      INTERPRETATION OF TELEMETRY: sinus rythm, NSVT, bigeminy    ECG:    I&O's Detail      LABS:                        8.1    20.78 )-----------( 239      ( 09 Jul 2018 05:48 )             25.6     07-09    144  |  114<H>  |  65<H>  ----------------------------<  113<H>  4.5   |  17<L>  |  3.90<H>    Ca    7.1<L>      09 Jul 2018 05:48  Mg     1.9     07-09    CARDIAC MARKERS ( 07 Jul 2018 17:51 )  0.024 ng/mL / x     / x     / x     / x        I&O's Summary    BNP  RADIOLOGY & ADDITIONAL STUDIES:    < from: Transthoracic Echocardiogram (07.10.18 @ 10:21) >     Fibrocalcific changes noted to the mitral valve leaflets with preserved   leaflet excursion.   Moderate (2+) mitral regurgitation is present.   The left atrium is moderately dilated.   Mild concentric left ventricular hypertrophy is present.   Estimated left ventricular ejection fraction is 55-60 %.    < end of copied text >

## 2023-01-17 NOTE — PHYSICAL THERAPY INITIAL EVALUATION ADULT - ADL SKILLS, REHAB EVAL
needed assist The patient has been re-examined and I agree with the above assessment or I updated with my findings.

## 2023-01-30 NOTE — PATIENT PROFILE ADULT - NSPROGENSOURCEINFO_GEN_A_NUR
Target blood sugar : 100 - 150 mg/dl    Check blood sugars in morning before you eat anything. Before lunch and before supper when you are planning to eat.     Check blood sugars when you feel low blood sugar symptoms.     Insulin dose:    Humalog : take 8 units with food containing high carbs  Take 5 units with food containing less carbs.     Toujeo : 25  units at bed time.      If notice blood sugars consistently less than 90 mg/dl in morning fasting time then decrease the dose of Toujeo by 2 units.       Call clinic if noticed blood sugars persistently  less than 90  mg/dl or higher than 200 mg/dl      My staff will call you in next 7  days time to obtain blood sugar data .      Follow up with me in 2 months   
patient

## 2023-04-17 NOTE — DISCHARGE NOTE ADULT - THE PATIENT HAS
Consult requested by VESTA Tamayo* for infertility counseling.  PCP:  No Pcp    Denies known Latex allergy or symptoms of Latex sensitivity.  Medications reviewed and updated.  Allergies reviewed.  Social History     Tobacco Use   Smoking Status Never   Smokeless Tobacco Never       No results found for: PSA    Pain: No  Hematuria: No  Burning: No  Incontinence: No    Past urological problems/procedures: None     REVIEW OF SYSTEMS:  GENERAL:  Patient denies fever, chills, tiredness, malaise.  EYES:  Patient denies blurred vision, double vision, pain, burning and itching.   IMMUNOLOGIC:  Patient denies drug allergies, but complains of hayfever.  NEUROLOGIC:  Patient denies tremors, dizzy spells, numbness, tingling, vision change, loss of balance.  ENDOCRINE:  Patient denies excessive thirst, heat intolerance, lymphnode enlargement.  GASTROINTESTINAL:  Patient denies abdominal pain, nausea/vomiting, indigestion/heartburn, diarrhea, constipation.  CARDIOVASCULAR:  Patient denies chest pain, varicose veins, high blood pressure.  SKIN:  Patient denies skin rashes, boils, persistent itching, acne.  MUSCULOSKELETAL:  Patient denies joint pain, neck pain, back pain, leg swelling.  ENT/MOUTH:  Patient denies ear infections, sore throats, sinus problems, hearing loss.  :  Patient denies urine retention, painful urination, urinary frequency, blood in urine, nocturia.  RESPIRATORY:  Patient denies wheezing, frequent cough, shortness of breath.   no difficulties

## 2023-04-24 NOTE — PATIENT PROFILE ADULT - AD AGENT PHONE NUMBER
In an effort to ensure that our patients [employer], a Team Member has reviewed your chart and identified an opportunity to provide the best care possible. An attempt was made to discuss or schedule overdue Preventive or Disease Management screening.     The Outcome was Contact was made, care gap was discussed - see further documentation. Care Gaps include Colorectal Cancer Screening.    Patients colonoscopy was ordered on 1/24/23. Patient is aware and does plan on having his colonoscopy done he just needs to \"work around his schedule\". Jean Claude said that he will call to schedule soon.    Presbyopia OUDiscussed refractive status in detail with patient. New glasses and contact Rx given today but do not recommend updating due to cataract progression. Discussed proper care replacement and hygiene. Continue to monitor. [place] OUDiscussed diagnosis with patient. Reviewed symptoms related to cataract progression. Discussed various treatment options with patient. Recommend cataract evaluation by Dr. Lezlie Siemens. Patient elects to schedule [phone]

## 2023-05-23 NOTE — DISCHARGE NOTE ADULT - NSTOBACCOREFERRAL_GEN_A_CS
Requested medication(s) are due for refill today: Yes  Patient has already received a courtesy refill: no    Other reason request has been forwarded to provider:
Patient declined information

## 2023-07-03 NOTE — PATIENT PROFILE ADULT. - MEDICATIONS BROUGHT TO HOSPITAL, PROFILE
Pt and vs reassessed. RR easy and unlabored.  Pt resting in bed alert and medicated per MAR> pt stable at this time     Jaqueline Harris  07/03/23 3791 no

## 2023-08-03 NOTE — PROGRESS NOTE ADULT - ASSESSMENT
Please advise.    84 y/o M PMHx significant for CAD s/p stents, AFib, diastolic CHF, hx of upper GI bleeding, PVD, hypertension, hypertension, severe COPD (O2 dependent), SHAYY on bipap at night, ESRD on HD (MWF), recurrent c. diff who was BIBA from dialysis ([facility]) where he was found to have a post-dialysis fever associated w/ tachycardia. In triage => /min, Tmax 102.7'F.   Seen in ER with daughter at bedside  chart reviewed case d/w HD nurses  possible UTI  blood cx done    a/p  ESRD on HD   dialysis via cvc  possible UTI  evaluate for bacteremia as well  duplex of UE for avf    10/7   febrile syndrome  cystitis  on Cefepime IV  Vanco po  HD MWF  vein mapping UE  Dr Clement evaluating for possible avf    10/8 SY  --ARYA/CKD   For now HD dependent at [facility] tx schedule.  Will plan for HD in am.  Creat trending down.   Continue to monitor for renal recovery.  --UTI with resistant Klebsiella.  Started on Meropenem today.  --Plan for AVF once ID cleared and if pt does remain on hd this admission.    10/9  ESBL in urine switched to Meropenem   feels well  no new events  on isolation  4 k bath

## 2023-08-03 NOTE — DIETITIAN INITIAL EVALUATION ADULT. - OTHER INFO
pt seen as LOS: 83yo male with PMH of CAD s/p stentsm PVD, s/p RLE AKA, CHF, ESRD on HD s/p angioplasty of Rt subclavian vein due to persistent edema of RUE post fistula placement.  Pt admitted for angiogram, s/p Lt femoral endarterectomy.  Upon visit, pt appears well nourished and overweight.  Pt and family at bedside report good PO intake/appetite, likely meeting <75% of estimated nutr needs.  no change in wt.  (+) trace Lt leg edema.   BM (+) 4/2.  omar score of 14, stage 1 PU on Lt heel.  Pt at higher nutr risk 2/2 dialysis.   labs noted; hyperphosphatemia with phosphate binders ordered.  encouraged protein intake with each meal and high protein snack between meals.  RECOMMENDATIONS: 1) encourage protein with each meal and high protein snack between meals 2) add nephrovite daily to ensure 100% RDI met 3) daily wt checks 4) monitor lytes/mins; replete/correct prn No

## 2023-12-22 NOTE — CHART NOTE - NSCHARTNOTEFT_GEN_A_CORE
Notified by RN that the patient H&H 7.9/25 will transfuse 1U of prbc and follow up the h&h after 3 it finishes. No

## 2024-01-31 NOTE — H&P ADULT - NSICDXFAMILYHX_GEN_ALL_CORE_FT
[facility] - Neuro Critical Care  Neurocritical Care  Progress Note    Admit Date: 1/30/2024  Service Date: 01/31/2024  Length of Stay: 1    Subjective:     Chief Complaint: Cervical myelopathy    History of Present Illness: Rebel Rodriguez is a 53 y.o. male with hx of psoriatic arthritis, hx TIA on Aspirin 81 mg, s/p C4-7 ACDF with Dr. Huff 10 years ago admitted to [facility] s/p C3-C4 ACDF. Per chart review, reports rapid functional decline over the last 3 weeks. Endorses hand clumsiness, difficulty typing, gait imbalance, difficulty butoning, dropping items, falls. Difficulty with ambulating also due to LLE weakness. States it feels like he has rubber bands around his wrists. Reports difficulty with overhead mobility and shock-like pains down spine when raising arms overhead. CT neck soft tissue pending, Patient admitted to [facility] for close monitoring and higher level of care.     Hospital Course: 01/31/2024 CT neck concerning for extensive soft tissue edema and air in neck, additionally w/ concern for DELFIN drain misplaced into hypopharynx. Taken class A to OR by ENT for pharyngeal repair, left intubated by ENT in setting of airway edema. On high dose steroids, no cuff leak this AM    Interval History:  Please see hospital course above for full details     Review of Systems   Unable to perform ROS: Intubated   Respiratory:  Negative for shortness of breath and wheezing.    Musculoskeletal:  Positive for myalgias and neck pain.        Pain controlled on current regimen   Neurological:  Negative for weakness and numbness.   Psychiatric/Behavioral:  Positive for sleep disturbance. Negative for confusion and decreased concentration. The patient is nervous/anxious.      Limited by patient intubated  Objective:     Vitals:  Temp: 98 °F (36.7 °C)  Pulse: 65  Rhythm: normal sinus rhythm  BP: 113/65  MAP (mmHg): 85  Resp: 12  SpO2: 99 %  Oxygen Concentration (%): 30  Vent Mode: Spont  Set Rate: 14 BPM  Vt Set: 510 mL  Pressure  Support: 8 cmH20  PEEP/CPAP: 5 cmH20  Peak Airway Pressure: 13 cmH20  Mean Airway Pressure: 7.6 cmH20  Plateau Pressure: 0 cmH20    Temp  Min: 97.7 °F (36.5 °C)  Max: 98.2 °F (36.8 °C)  Pulse  Min: 62  Max: 83  BP  Min: 96/62  Max: 137/72  MAP (mmHg)  Min: 75  Max: 97  Resp  Min: 12  Max: 30  SpO2  Min: 96 %  Max: 100 %  Oxygen Concentration (%)  Min: 30  Max: 100    01/30 0701 - 01/31 0700  In: 4307 [I.V.:713.1]  Out: 1155 [Urine:1125; Drains:10]            Physical Exam  General Appearance: Middle aged gentleman resting in bed, intubated, surgical drain in neck; Not in acute hemodynamic distress. No cuff leak on exam   Mental Status Exam: awake, alert, aware, oriented, nodding yes/no to questions, following commands briskly x4, writing on pad of paper to make needs/questions known  Cranial Nerves: VFF, EOM full, pupils are equal and reactive to light bilaterally, symmetric facial sensory, symmetric facial motor, hearing grossly normal, midline tongue  Motor: no drift in the UE/LE. Power is 5/5 in both UE/LE. Normal tone.  Sensory: Symmetric to LT in all 4 limbs without extinction  Vascular: S1/S2 of normal intensity, no S3/S4 appreciated, no murmurs appreciated  Lungs: CTA bilaterally without wheezing  Abdomen: Soft, non-distended, non-tender, BS +         Medications:  Continuoussodium chloride 0.9%, Last Rate: 50 mL/hr at 01/31/24 1200  dexmedeTOMIDine (Precedex) infusion (titrating), Last Rate: 0.2 mcg/kg/hr (01/31/24 1200)  fentanyl, Last Rate: 100 mcg/hr (01/31/24 1200)    Scheduledampicillin-sulbactam, 3 g, Q6H  atorvastatin, 10 mg, Daily  bisacodyL, 10 mg, Daily  dexAMETHasone, 4 mg, Q6H  famotidine, 20 mg, BID  FLUoxetine, 40 mg, Daily  methocarbamol (ROBAXIN) IVPB, 500 mg, Q8H  mirtazapine, 15 mg, QHS  mupirocin, , BID  senna-docusate 8.6-50 mg, 1 tablet, BID  vancomycin (VANCOCIN) IV (PEDS and ADULTS), 15 mg/kg, Q12H    PRNacetaminophen, 650 mg, Q6H PRN  fentanyl, 50 mcg, Q1H PRN  magnesium oxide, 800  mg, PRN  magnesium oxide, 800 mg, PRN  morphine, 2 mg, Q1H PRN  ondansetron, 4 mg, Q6H PRN  oxyCODONE, 5 mg, Q4H PRN  potassium bicarbonate, 35 mEq, PRN  potassium bicarbonate, 50 mEq, PRN  potassium bicarbonate, 60 mEq, PRN  potassium, sodium phosphates, 2 packet, PRN  potassium, sodium phosphates, 2 packet, PRN  potassium, sodium phosphates, 2 packet, PRN  prochlorperazine, 5 mg, Q6H PRN  vancomycin - pharmacy to dose, , pharmacy to manage frequency      Today I personally reviewed pertinent imaging, laboratory results, notably: CT neck w/ extensive edema and air, concern for malposition of DELFIN drain. CXR w/ ET tube in appropriate position. CBC w/ leukocytosis to 19.08, Hb/platelets stable. CMP unremarkable.     Diet  Diet NPO  Diet NPO  NPO x5 days per ENT    Assessment/Plan:     Neuro  * Cervical myelopathy  53 y.o. male with cervical myelopathy due to C3-4 severe stenosis with cord signal change, above his prior fusion admitted to [facility] s/p C3-4 anterior cervical discectomy and fusion. Operative course c/b pharyngeal injury s/p emergent ENT repair on 1/30     -SBP <160  -neuro checks q4 hr  -Fentanyl gtt for pain, currently well controlled  - dex 4 q 6hr  -Nsgy following  -PT/OT/SLP        Psychiatric  Anxiety  Hx of     - C/w home remeron and fluoxetine  - Precedex gtt for comfort while intubated    Pulmonary  On mechanically assisted ventilation  Intubated due to upper airway compromise, s/p surgical pharyngeal repair on 1/30 w/ ENT    - Tolerating PS 8/5 @ 40% this AM, continue as tolerated  - No cuff leak on AM exam, unable to extubate   - C/w high dose steroids, monitor daily for cuff leak, extubate if able -> may require trach/PEG to facilitate healing, defer to ENT  - Daily ABG and CXR while intubated  - Fentanyl/precedex gtt for comfort while intubated    Cardiac/Vascular  Hyperlipidemia  Hx of     - C/w home atorvastatin     GI  Dysphagia  2/2 pharyngeal injury    - NPO x5 days post-operatively per ENT  -  May require PEG, general surgery following, appreciate their assistance    Orthopedic  Injury of pharynx  DELFIN drain noted to be in hypopharynx on post-op imaging, s/p emergent surgical repair on 1/30    - NPO x5 days per ENT  - On vanc/unasyn -> clarify duration of abx  - Dex 4q6hrs, taper per ENT  - Wean vent as able           The patient is being Prophylaxed for:  Venous Thromboembolism with: Mechanical or Chemical  Stress Ulcer with: H2B  Ventilator Pneumonia with: chlorhexidine oral care    Activity Orders            Turn patient every 2 hours starting at 01/31 0000    Diet NPO: NPO starting at 01/30 1712    Up in chair With meals starting at 01/30 1100    Elevate HOB starting at 01/30 1054    Ambulate Post Op Day 0 starting at 01/30 1052    Progressive Mobility Protocol (mobilize patient to their highest level of functioning at least twice daily) starting at 01/30 1046    Elevate HOB 30 starting at 01/30 1046    Ambulate With Assistance, Post Op Day 0 If the patient arrives from PACU by 4PM, walk at least once on day of operation if alert and safe to do so. starting at 01/30 1045          Full Code    Uninterrupted Critical Care/Counseling Time (not including procedures): 40 minutes  There is high probability for acute neurological change leading to clinical and possibly life-threatening deterioration requiring highest level of physician preparedness for urgent intervention. Critical care time was spent personally by me on the following activities: development of treatment plan with patient or surrogate and bedside caregivers, discussions with consultants, evaluation of patient's response to treatment, examination of patient, ordering and performing treatments and interventions, ordering and review of laboratory studies, ordering and review of radiographic studies, pulse oximetry, antibiotic titration if applicable, vasopressor titration if applicable, re-evaluation of patient's condition. I spent greater than  50% of the documented critical care time assessing and making medical decisions for this patient.       Yary Bay MD  Neurocritical Care  [facility] - Neuro Critical Care       FAMILY HISTORY:  Family history of colorectal cancer  Family history of hypertension    Sibling  Still living? Unknown  Family history of diabetes mellitus, Age at diagnosis: Age Unknown

## 2024-02-10 NOTE — ASU PREOP CHECKLIST - ISOLATION PRECAUTIONS
[facility] Program Outreach:  Name: JORDYN PALOMARES     Question 1   Medication Questions   Do you have questions about any of your medications? Please press 1 if you do not have any questions or press 2 if you have any questions.   Has RX Questions        Call Status:     Attempt 1 Answered    Used:     Yes     if yes, what language?:     Hebrew    Medication Questions - Issues    Comments:   Patient states that they pressed the wrong button. Denies any further questions or concerns at this time.    
none

## 2024-02-19 NOTE — PROGRESS NOTE ADULT - SUBJECTIVE AND OBJECTIVE BOX
Dr. Sebastian Saul    5/10/2018      Rolando Schneider  [address]  [place] [address]                    Dear Mr. Schneider    Please review the enclosed information.    Thank you.       Which breast ? NEPHROLOGY INTERVAL HPI/OVERNIGHT EVENTS:  no new complaints. doing well.  tolerating po    MEDICATIONS  (STANDING):  buDESOnide   0.5 milliGRAM(s) Respule 0.5milliGRAM(s) Inhalation two times a day  doxazosin 8milliGRAM(s) Oral at bedtime  isosorbide   mononitrate ER Tablet (IMDUR) 120milliGRAM(s) Oral daily  diltiazem   CD 120milliGRAM(s) Oral daily  gabapentin 300milliGRAM(s) Oral daily  allopurinol 100milliGRAM(s) Oral daily  fenofibrate Tablet 145milliGRAM(s) Oral daily  simvastatin 10milliGRAM(s) Oral at bedtime  pramipexole 0.125milliGRAM(s) Oral three times a day  ALBUTerol    90 MICROgram(s) HFA Inhaler 2Puff(s) Inhalation every 6 hours  fluticasone propionate 50 MICROgram(s)/spray Nasal Spray 1Spray(s) Both Nostrils two times a day  metoprolol 12.5milliGRAM(s) Oral two times a day  hydrALAZINE 150milliGRAM(s) Oral two times a day  lactobacillus acidophilus 1Tablet(s) Oral daily  gabapentin 600milliGRAM(s) Oral at bedtime  pantoprazole  Injectable 40milliGRAM(s) IV Push every 12 hours  docusate sodium 100milliGRAM(s) Oral two times a day  senna 2Tablet(s) Oral at bedtime  furosemide    Tablet 80milliGRAM(s) Oral daily    MEDICATIONS  (PRN):  aluminum hydroxide/magnesium hydroxide/simethicone Suspension 30milliLiter(s) Oral every 6 hours PRN Dyspepsia  phenol 1.4% (CHLORASEPTIC) Oral Spray 2Spray(s) Topical three times a day PRN mouth  care  oxyCODONE  5 mG/acetaminophen 325 mG 1Tablet(s) Oral every 4 hours PRN Moderate Pain (4 - 6)  oxyCODONE IR 10milliGRAM(s) Oral every 4 hours PRN Severe Pain (7 - 10)      Allergies    No Known Allergies    Intolerances        I&O's Detail  I & Os for 24h ending 22 May 2017 07:00  =============================================  IN:    Total IN: 0 ml  ---------------------------------------------  OUT:    Voided: 700 ml    Total OUT: 700 ml  ---------------------------------------------  Total NET: -700 ml    I & Os for current day (as of 22 May 2017 14:27)  =============================================  IN:    Oral Fluid: 240 ml    Total IN: 240 ml  ---------------------------------------------  OUT:    Voided: 200 ml    Total OUT: 200 ml  ---------------------------------------------  Total NET: 40 ml      Vital Signs Last 24 Hrs  T(C): 37.1, Max: 37.6 (05-21 @ 20:42)  T(F): 98.8, Max: 99.6 (05-21 @ 20:42)  HR: 80 (74 - 88)  BP: 145/40 (145/40 - 166/50)  BP(mean): --  RR: 18 (18 - 18)  SpO2: 98% (93% - 99%)  Daily     Daily     PHYSICAL EXAM:  General: alert. awake Ox3  HEENT: MMM  CV: s1s2 rrr  LUNGS: B/L CTA  EXT: LE dressing in place    LABS:                        9.9    6.9   )-----------( 286      ( 21 May 2017 08:06 )             32.7     05-21    150<H>  |  116<H>  |  61<H>  ----------------------------<  99  3.9   |  23  |  1.03    Ca    7.9<L>      21 May 2017 08:06

## 2024-03-18 NOTE — PROGRESS NOTE ADULT - PROBLEM SELECTOR PLAN 1
Case ID: [id number] Close reason: Prior Authorization not required for patient/medication  Note from payer: This medication or product is on your plan's list of covered drugs. Prior authorization is not required at this time. If your pharmacy has questions regarding the processing of your prescription or if the request is for [employer] Morphine Equivalent (MME) review, please call the [facility] pharmacy help desk at [phone] for further assistance.   Payer: [employer] [facility] Part D    [phone]      - HH stable  - Likely Anemia of Chronic Disease  - No EGD - patient is high risk  - S/p 1u PRBC

## 2024-04-12 NOTE — PROGRESS NOTE ADULT - ASSESSMENT
c diff colitis  By mouth vancomycin, high dose   case discussed with  family  Patient with continued abdominal pain but improving and possible inc diarrhea, improving slowly      Patient had a recent course of C. difficile that was treated with vancomycin with a recurrence and he's had C. difficile in the past.  Due to severe C. difficile, would strongly consider a prolonged taper of vancomycin.  For now, would complete two-week course of vancomycin 500 qid  and then would taper her vancomycin over a long time.  Discussed with family.  Taper vancomycin  Vancomycin 500 mg 4 times a day, total of 2 weeks  Then 3 times a day for 2 weeks, then twice a day for one week, then daily for one week, then every other day for 1 week, then every 3rd day for 1 week.  Follow-up with me after that  Vanco taper eventually. However At this time, would continue high-dose vancomycin with Flagyl. Is improving slowly on it.  Encourage by mouth intake, encourage physical therapy    cough/sneeze, clinical FU and hospitalist management    DW hospitalist      A fibrillation    COPD obesity, obstructive sleep apnea, coronary artery disease, overall comorbid risk factors     IVF per renal  HD as needed      acites, SP paracentesis      DW ID w/o LOC or head trauma  imaging w/o acute findings, neuro exam nonfocal  preceded by lightheadedness after rising from seated position c/f orthostasis (of note previous admit c/b HOTN with dizziness)   - obtain orthostatic VS  - monitor L toe (currently not in pain)  - fall precaution, PT c/s w/o LOC or head trauma  imaging w/o acute findings, neuro exam nonfocal  preceded by lightheadedness after rising from seated position c/f orthostasis (of note previous admit c/b HOTN with dizziness)   - obtain orthostatic VS  - obtain TTE re recent valvular intervention  - monitor L toe (currently not in pain)  - fall precaution, PT c/s

## 2024-04-15 NOTE — CONSULT NOTE ADULT - CONSULT REASON
FREE:[LAST:[your primary care doctor],PHONE:[(   )    -],FAX:[(   )    -],FOLLOWUP:[1-3 Days]]
ARYA/CKD
CODE STROKE
L leg stasis changes and heel ulcer
SOB
retention

## 2024-05-14 NOTE — ED ADULT NURSE NOTE - IN THE PAST YEAR, HOW OFTEN HAVE YOU USED TOBACCO PRODUCTS?
Diagnosis: SOB (shortness of breath) [523791]   Future Attending Provider: ARIAN BYNUM [[id number]]   Is the patient being sent to ED Observation?: No   Never

## 2024-06-24 NOTE — PHYSICAL THERAPY INITIAL EVALUATION ADULT - GENERAL OBSERVATIONS, REHAB EVAL
[de-identified] : LYNN ALAS is a 40-year F w/ fibrocystic breast, s/p resolution of L 10N2 abscess. L 12-1:00N7 2.2 cm lobulated mass corresponding to focal asymmetry on MG s/p core bx yielding GM. Here for follow-up after imaging.   Initial H&P:  Reports noticing Left breast changes at beginning of February and end of march, no fever or chills at that time. Saw GYN in Late March at that time area turned slightly pink and started to look like a bruise in April. when to see PCP in late April and was started on Cephalexin 500 mg QID on 4/29/23 - 6 days on antibiotic. Patient reports area is improving but .  Significant Family Hx: Non-Hodgkin lymphoma (Maternal Uncle unknown age of diagnosis)  PMHx: Chronic HTN dx 2016, pre-dm, HLD (Metabolic syndrome), Obesity   5/11/23 Since last visit has completed her antibiotic regiment and returns with no complaints - Wound is healing well.  5/30/23 No new complaints, Denies any fever or chills. States there continues to be a pinpoint opening.   1/18/24 Since last visit has not scheduled imaging due 12/2023. Reports resolution of previous left 10:00 abscess but noted a left 12:00 mass in 11/2023 that was asymptomatic but recently has become tender to palpation. Denies any trauma to area. No fevers or chills. On clinical exam & bedside ultrasound, area of concern is c/w an island of dense breast tissue. We discussed a short course of anti-inflammatory meds and instructed her to stop breast exams to prevent trauma to area.  3/12/24. Patient states she feels a palpable lump over her left central breast which is currently non-tender. Reports no open wounds or drainage. No fevers or chills. On clinical exam there is a palpable 2.5 cm mass at left 12N7.  4/2/24 Since last visit reports improvement of symptoms since the biopsy. She can no longer feel previous mass.  Pt. received in bed on 3N, 2L O2 on, pt.'s daughter's in room.

## 2024-09-11 NOTE — ED ADULT NURSE NOTE - READER
[FreeTextEntry1] : 01/16/24 OC X-Ray Examination of the RIGHT KNEE: 4 views: Moderate tricompartmental OA Yes

## 2024-10-01 NOTE — PROGRESS NOTE ADULT - SUBJECTIVE AND OBJECTIVE BOX
[address] [address]                       ELECTROENCEPHALOGRAM REPORT      PATIENT NAME: CARMELLA ACOSTA               : 1958  MED REC NO: [id number]                          ROOM:   ACCOUNT NO: [id number]                       ADMIT DATE: 2024  PROVIDER: Zachary Colin MD      DATE OF EE2024    REFERRING PHYSICIAN:  ZACHARY COLIN    INDICATION FOR TEST:  Altered mental status.    DESCRIPTION:  The waking background consists of a rhythmic and symmetric well-formed and well-regulated posterior dominant 8 to 9 hertz activity.  During drowsiness, background frequency decreased by 2 to 3 hertz.  Stage 2 sleep is not seen.  Photic stimulation produced no abnormalities.  No epileptiform discharges nor any focal lateralizing slowing was noted.    IMPRESSION:  Normal awake and drowsy EEG.          ZACHARY COLIN MD      D:  10/01/2024 10:31:37     T:  10/01/2024 11:06:36     JEANNETTE/GLADIS  Job #:  [id number]     Doc#:  [id number]      Pt has been seen and examined with FP resident, resident supervised agree with a/p       Patient is a 82y old  Male who presents with a chief complaint of Sent from [facility] rehab because of low hemoglobin 7.7 (17 Jun 2017 00:37)        HPI:  82 year old male with history of CAD s/p stents (LAD, RCA bare metal around 9/16),PVD (RLE stent/ angioplasty and surgical debridment by Dr Siu 5/20 ) A.Fib not on anticoagulation due to GI bleeding, Hypertension, COPD on home O2 2L, SHAYY on nocturnal BIPAP, ex-smoker (smoked 1ppd X 50 years, quit 22 years ago),  Chronic diastolic CHF, Hyperlipidemia, PVD, Iron deficiency anemia, Chronic back pain, Gout, BPH, CKD III with baseline Cr 2. Was ecently admitted to  on  4/17 with anemia of GI bleeding, RLE and RUE DVTs,( received pRBCs and IVC filter discharged to rehab with aspirin) was readmitted to   ER on 5/4/17 for worsening anemia with Hb 6, worsening leg pain from ulcers and worsening renal function Cr 3.99 now presents from [facility] with anemia (7.7 on labs today decreasing from 9.5 over past few weeks). Pt c/o gradual onset  progressive fatigue over the last couple of weeks. Patient is unable to ambulate because of his leg cellulitis and ulcers. He denies any shortness of breath, palpitations / chest pain / light headedness. According to the daughter the attendants at [facility] did notice dark stools for the last couple of days. Patient denies any abdominal pain or change in bowel or urinary habits.  In ED, + guaiac. (17 Jun 2017 00:37)        PHYSICAL EXAM:  Vital Signs Last 24 Hrs  T(C): 36.7, Max: 37.1 (06-19 @ 04:54)  T(F): 98.1, Max: 98.7 (06-19 @ 04:54)  HR: 68 (68 - 81)  BP: 152/39 (137/55 - 159/40)  BP(mean): --  RR: 18 (18 - 18)  SpO2: 96% (96% - 98%)  general- comfortable   -rs-aeeb,cta  -cvs-s1s2 normal   -p/a-soft,bs+  -extremity- right leg groin wound and leg wound noted, eschar present in right heel and right side of leg  -cns- non focal         A/P    #Anemia-Anemia due to chronic disease or gi bleed   -today EGD   -ct to monitor     #Hypernatremia- monitor it, give gentle dextrose, ct to hold off on lasix     #fever- possibly from wound infection - OM of calcaneum   -ct abx, local care, Dr. Mccracken evlauation appreciated     #DVT pr-heparin, no drop in h/h so far

## 2024-11-12 NOTE — ED PROVIDER NOTE - CPE EDP PSYCH NORM
Diagnosis:   1. Iron deficiency anemia, unspecified iron deficiency anemia type    2. Multiple myeloma in remission  (CMD)    3. Malignant neoplasm metastatic to bone  (CMD)       Regimen: velcade  Cycle/Day: day 1 cycle 59    Dr San is ordering clinician today.    Vital Signs:  ONC OP Encounter Vitals  BP: 105/69  Heart Rate: 71  Resp: 16  Temp: 98.1 °F (36.7 °C)  SpO2: 100 %  Weight: 61.6 kg (135 lb 12.9 oz)  Height: 5' 4.25\" (1.632 m)  Pain Score:  0  BSA (Calculated - m2) - Herman & Herman: 1.66  BSA (Calculated - sq m): 1.67  BMI (Calculated): 23.13      Allergies:  ALLERGIES:  No Known Allergies     Medications:  The medication list was reviewed. No changes noted.     ECOG: ECOG Performance Status: 0    Distress Screening: Is this day one of cycle or a new regimen? No No, patient did not indicate any new concerns. Refer to most recent PHQ2/9 score, Distress screening reviewed from previous visit, no further interventions, and Distress screening completed, see flowsheet for details    Toxicity Assessment:   Auditory/Ear  Assessment: Yes (Within Defined Limits)    Cardiac General  Assessment: Yes (Within Defined Limits)    Dermatology/Skin  Assessment: Yes (Within Defined Limits)    Constitutional  Assessment: Yes (Within Defined Limits)    Endocrine  Assessment: Yes (Within Defined Limits)    Gastrointestinal  Assessment: Yes (Within Defined Limits)    Hemorrhage/Bleeding  Assessment: Yes (Within Defined Limits)    Infection  Assessment: Yes (Within Defined Limits)    Lymphatics  Assessment: Yes (Within Defined Limits)    Musculoskeletal  Assessment: Yes (Within Defined Limits)    Neurology  Assessment: Yes (w/ Exceptions to WDL)  Paresthesia: Grade 1    Ocular  Assessment: Yes (Within Defined Limits)    Pain  Assessment: Yes (Within Defined Limits)    Pulmonary/Upper Respiratory  Assessment: Yes (Within Defined Limits)    Genitourinary  Assessment: Yes (Within Defined Limits)      Additional Nursing  Assessment: In addition to above toxicity assessment, this RN assessed no complaints .       Prechemo Checklist  Chemo Consent Signed: Yes  Protocol Verified: Yes  Is Protocol Standard of Care or Research?: Standard of Care  Pre-Chemo Labs Reviewed?: Yes  Provider Notified of Abnormal Labs Not Meeting Treatment Conditions: N/A  Pregnancy Screening Performed (if indicated): Not applicable  All Treatment Conditions Met?: Yes  BSA/weight in Orders Verified for Weight-Based Drugs?: Yes  Chemo Dose Calculations Verified: Yes  Second RN Verified Calculations: clemente diaz rn      Pre-Treatment: Patient has valid pre-authorization, Premed orders, including hydration, are verified prior to administration, and I have reviewed the following with the patient: Name of chemo drug, duration and route of infusion, Infusion/Drug volume and dose, and reportable infusion-related symptoms.     Treatment: Refer to [facility] and MAR for line assessment and medication administration, Chemotherapy has not ; double checked & verified by two practitioners, Appearance and physical integrity of drugs meets standard of drug monograph; double checked & verified by two practitioners, Rate set on infusion pump is in alignment with ordered rate; double checked & verified by two practitioners, Drugs were administered in proper sequencing, Blood return confirmed before, during and after treatment administered, and Infusion pump used for non-vesicant drugs    Post Treatment: Treatment tolerated well; no adverse reaction    Oral Chemotherapy: Yes.         Discussed oral chemotherapy adherence and side effects with patient.  Patient is taking medication as prescribed.    Education: No new instructions needed    Next appointment scheduled: 24  Patient instructed to call the office with any questions or concerns.    Patient Discharged: patient discharged to home per self, ambulatory   normal...

## 2024-11-13 NOTE — PATIENT PROFILE ADULT. - TEACHING/LEARNING RELIGIOUS CONSIDERATIONS
disintegrating tablet Take 1 tablet by mouth 3 times daily as needed for Nausea or Vomiting 10 tablet 0    fluticasone (FLONASE) 50 MCG/ACT nasal spray 1 spray by Each Nostril route Every Day      magnesium 200 MG TABS tablet Take 1 tablet by mouth daily 90 tablet 3    Cetirizine HCl 10 MG CAPS Take 10 mg by mouth daily 90 capsule 3    Tirzepatide (MOUNJARO) 2.5 MG/0.5ML SOPN SC injection Inject 0.5 mLs into the skin once a week 4 each 0    Tirzepatide (MOUNJARO) 5 MG/0.5ML SOPN SC injection Inject 0.5 mLs into the skin once a week (Patient not taking: Reported on 11/13/2024) 4 Adjustable Dose Pre-filled Pen Syringe 4    buPROPion (WELLBUTRIN XL) 150 MG extended release tablet Take 1 tablet by mouth every morning (Patient not taking: Reported on 11/13/2024) 90 tablet 1     No current facility-administered medications for this visit.            Return to Office: Return in about 2 weeks (around 11/27/2024), or if symptoms worsen or fail to improve, for lightheadedness , rash, UTI , lab work .    This document may have been prepared at least partially through the use of voice recognition software. Although effort is taken to assure the accuracy of this document, it is possible that grammatical, syntax,  or spelling errors may occur.            An electronic signature was used to authenticate this note.  --NOLA ROSARIO MD     
none

## 2024-11-25 NOTE — PROGRESS NOTE ADULT - SUBJECTIVE AND OBJECTIVE BOX
NEPHROLOGY INTERVAL HPI/OVERNIGHT EVENTS:   SY  Not feeling well this am.  Complains of lower abdominal pain again.  Does report increased urine output.      No acute events.  Again reports increased urine output.--Not recorded.  Complains of continued diarrhea.      Post HD yesterday.  post Paracentesis.     Reports feeling much better.  Reports increased urine output.    MEDICATIONS  (STANDING):  ALBUTerol/ipratropium for Nebulization. 3 milliLiter(s) Nebulizer once  allopurinol 100 milliGRAM(s) Oral daily  aspirin  chewable 81 milliGRAM(s) Oral daily  buDESOnide   0.5 milliGRAM(s) Respule 0.5 milliGRAM(s) Inhalation two times a day  cholestyramine Powder (Sugar-Free) 4 Gram(s) Oral two times a day  diltiazem    Tablet 30 milliGRAM(s) Oral every 6 hours  doxazosin 8 milliGRAM(s) Oral at bedtime  epoetin nelly Injectable 3000 Unit(s) IV Push <User Schedule>  epoetin nelly Injectable 4000 Unit(s) IV Push <User Schedule>  finasteride 5 milliGRAM(s) Oral daily  heparin  Injectable 5000 Unit(s) SubCutaneous every 12 hours  metoclopramide 10 milliGRAM(s) Oral three times a day  pantoprazole    Tablet 40 milliGRAM(s) Oral every 12 hours  simvastatin 10 milliGRAM(s) Oral at bedtime  sodium ferric gluconate complex Injectable 125 milliGRAM(s) IV Push <User Schedule>  vancomycin    Solution 125 milliGRAM(s) Oral every 6 hours    MEDICATIONS  (PRN):  acetaminophen   Tablet 650 milliGRAM(s) Oral every 8 hours PRN For Temp greater than 38 C (100.4 F)  acetaminophen   Tablet. 650 milliGRAM(s) Oral every 8 hours PRN Mild Pain (1 - 3)  albumin human 25% IVPB 50 milliLiter(s) IV Intermittent <User Schedule> PRN sbp<=120mmhg  ALBUTerol   0.5% 2.5 milliGRAM(s) Nebulizer every 4 hours PRN Shortness of Breath and/or Wheezing  diphenhydrAMINE   Capsule 25 milliGRAM(s) Oral at bedtime PRN sleep  HYDROmorphone  Injectable 0.5 milliGRAM(s) IV Push every 6 hours PRN Severe Pain (7 - 10)  ondansetron Injectable 4 milliGRAM(s) IV Push every 6 hours PRN Nausea and/or Vomiting          Vital Signs Last 24 Hrs  T(C): 36.7 (2018 05:31), Max: 36.8 (2018 12:36)  T(F): 98.1 (2018 05:31), Max: 98.3 (2018 17:06)  HR: 80 (2018 08:58) (63 - 84)  BP: 119/27 (2018 05:31) (102/21 - 133/41)  BP(mean): --  RR: 18 (2018 05:31) (15 - 18)  SpO2: 97% (2018 05:31) (97% - 97%)  Daily     Daily Weight in k.8 (2018 08:54)     @ 07:01  -   @ 07:00  --------------------------------------------------------  IN: 340 mL / OUT: 0 mL / NET: 340 mL        PHYSICAL EXAM:  Alert and appropriate  GENERAL: No distress  CHEST/LUNG: fair air entry  HEART: S1S2 RRR  ABDOMEN: distended  EXTREMITIES: much improved LE and abdominal wall edema  SKIN:     LABS:                        7.5    3.47  )-----------( 174      ( 2018 07:00 )             24.7         142  |  107  |  28<H>  ----------------------------<  74  3.4<L>   |  25  |  3.21<H>    Ca    7.5<L>      2018 07:00  Phos  2.9       Mg     1.7         TPro  x   /  Alb  2.3<L>  /  TBili  x   /  DBili  x   /  AST  x   /  ALT  x   /  AlkPhos  x           Phosphorus Level, Serum: 2.9 mg/dL ( @ 07:00)  Magnesium, Serum: 1.7 mg/dL ( @ 07:00)          RADIOLOGY & ADDITIONAL TESTS: None

## 2024-11-27 NOTE — PROGRESS NOTE ADULT - ASSESSMENT
1. Patient with c. dif. continue vanco, flagyl  2. had bowel movements - d/c ngt, start clear liquids  3. renal failure - was dialyzed yesterday  4. attempt to get oob Quality 130: Documentation Of Current Medications In The Medical Record: Current Medications Documented Quality 431: Preventive Care And Screening: Unhealthy Alcohol Use - Screening: Patient not identified as an unhealthy alcohol user when screened for unhealthy alcohol use using a systematic screening method Detail Level: Detailed Quality 226: Preventive Care And Screening: Tobacco Use: Screening And Cessation Intervention: Patient screened for tobacco use and is an ex/non-smoker

## 2025-01-05 NOTE — DISCHARGE NOTE PROVIDER - HOSPITAL COURSE
Pt admitted and underwent angioplasty of innominate vein for improving fistula function Mar 28 and L common femoral endarterectomy Apr 1.  His post operative course was unremarkable.  His fistula functioned acceptably and his L foot pain resolved after the surgery. altered mental status

## 2025-01-14 NOTE — PROGRESS NOTE ADULT - PROBLEM SELECTOR PLAN 6
- c/w ppi
ARB discontinued with improvement of Cr.  Continue to monitor and continue to hold diuresis
14-Jan-2025 19:45

## 2025-01-21 NOTE — PROGRESS NOTE ADULT - SUBJECTIVE AND OBJECTIVE BOX
Subjective:  Patient is a 82y old  Male who presents with a chief complaint of bright blood at rectal area    HPI:         82 year old male with history of CAD s/p stents (LAD, RCA bare metal around ),PVD (RLE stent/ angioplasty and surgical debridement by Dr Siu ,right lower extremity amputation ) A.Fib  on coumadin, Hypertension, COPD on home O2 2L, SHAYY on history of  nocturnal BIPAP, ex-smoker (smoked 1ppd X 50 years, quit 22 years ago),  Chronic diastolic CHF, Hyperlipidemia, PVD, Iron deficiency anemia, Chronic back pain, Gout, BPH, CKD III . 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 Patient has been in rehab now for 5 months   In ED - no gross blood in stools, no cookie ,  ED physician notes stool guaic positive ,on rectal exam done by ED physician there was some perianal skin tear and brown stools , INR  was 4 , reports chronic diarrhea 10 times a day for which he takes loperamide prn and lactobacillus    17 - Patient seen and examined at bedside earlier today, no rectal bleeding, denies CP, palpitations,, dyspnea, abd pain  17 pt seen and examined at bedside earlier today, denies rectal bleeding, + lose BM , reports h/o C diff, denies dyspnea, palpitations, abd pain    Review of system- Rest of the review of system are negative except mentioned in HPI    OBJECTIVE:   T(C): 36.5 (17 @ 11:01), Max: 36.8 (17 05:38)  T(F): 97.7 (17 @ 11:01), Max: 98.3 (17 @ 05:38)  HR: 72 (17 11:01) (66 - 86)  BP: 153/41 (17 11:01) (142/40 - 165/55)  RR: 18 (17 11:01) (18 - 19)  SpO2: 99% (17 11:01) (95% - 99%)  Wt(kg): --  Daily Height in cm: 165.1 (03 Sep 2017 17:38)    Daily Weight in k.5 (04 Sep 2017 01:34)    PHYSICAL EXAM:  GENERAL: NAD  NERVOUS SYSTEM:  Alert & Oriented X3, non- focal exam  HEAD:  Atraumatic, Normocephalic  EYES: EOMI, PERRLA, conjunctiva and sclera clear  HEENT: Moist mucous membranes  NECK: Supple, No JVD  CHEST/LUNG: Clear to auscultation bilaterally; No rales, no rhonchi, no wheezing, or rubs  HEART: Regular rate and rhythm; No murmurs, rubs, or gallops  ABDOMEN: Soft, Nontender, Nondistended; Bowel sounds present  GENITOURINARY- Voiding, no suprapubic tenderness  EXTREMITIES:  - chronic LLE edema 1+, RLE amputation  MUSCULOSKELETAL: No muscle tenderness, Muscle tone normal, No joint tenderness, no Joint swelling, Joint range of motion-normal  SKIN-no rash, no lesion    LABS:      146<H>  |  114<H>  |  33<H>  ----------------------------<  89  3.9   |  26  |  1.39<H>    Ca    8.2<L>      05 Sep 2017 06:49  Phos  2.9       Mg     1.9         TPro  5.1<L>  /  Alb  2.1<L>  /  TBili  0.2  /  DBili  x   /  AST  18  /  ALT  8<L>  /  AlkPhos  42                              8.4    7.1   )-----------( 267      ( 05 Sep 2017 06:49 )             26.5       LIVER FUNCTIONS - ( 04 Sep 2017 07:29 )  Alb: 2.1 g/dL / Pro: 5.1 gm/dL / ALK PHOS: 42 U/L / ALT: 8 U/L / AST: 18 U/L / GGT: x             PT/INR - ( 05 Sep 2017 06:49 )   PT: 35.3 sec;   INR: 3.19 ratio         PTT - ( 04 Sep 2017 07:29 )  PTT:52.8 sec    Urinalysis Basic - ( 03 Sep 2017 20:28 )    Color: Yellow / Appearance: Clear / S.015 / pH: x  Gluc: x / Ketone: Negative  / Bili: Negative / Urobili: Negative mg/dL   Blood: x / Protein: 100 mg/dL / Nitrite: Negative   Leuk Esterase: Trace / RBC: 0-2 /HPF / WBC 6-10   Sq Epi: x / Non Sq Epi: Few / Bacteria: Moderate                            8.5    8.7   )-----------( 287      ( 04 Sep 2017 07:29 )             27.1     09-04    148<H>  |  115<H>  |  33<H>  ----------------------------<  88  3.4<L>   |  27  |  1.49<H>    Ca    8.2<L>      04 Sep 2017 07:29  Phos  2.9     -  Mg     1.9         TPro  5.1<L>  /  Alb  2.1<L>  /  TBili  0.2  /  DBili  x   /  AST  18  /  ALT  8<L>  /  AlkPhos  42  -04    PT/INR - ( 04 Sep 2017 07:29 )   PT: 45.1 sec;   INR: 4.06 ratio         PTT - ( 04 Sep 2017 07:29 )  PTT:52.8 sec      Urinalysis Basic - ( 03 Sep 2017 20:28 )    Color: Yellow / Appearance: Clear / S.015 / pH: x  Gluc: x / Ketone: Negative  / Bili: Negative / Urobili: Negative mg/dL   Blood: x / Protein: 100 mg/dL / Nitrite: Negative   Leuk Esterase: Trace / RBC: 0-2 /HPF / WBC 6-10   Sq Epi: x / Non Sq Epi: Few / Bacteria: Moderate    CAPILLARY BLOOD GLUCOSE    RECENT CULTURES:    RADIOLOGY & ADDITIONAL TESTS:  < from: Xray Chest 1 View AP/PA. (17 @ 18:47) >  Stable diffuse reticulonodular opacities likely consistent with pulmonary   vascular congestion. More prominent airspace opacity with air   bronchograms seen projecting over the left ventricular apex. Consider PA   and lateral views or CT of the chest. Follow to resolution.      < end of copied text >    Current medications:  ferrous    sulfate 325 milliGRAM(s) Oral daily  metoprolol 25 milliGRAM(s) Oral two times a day  pramipexole 0.125 milliGRAM(s) Oral daily  pantoprazole    Tablet 40 milliGRAM(s) Oral before breakfast  hydrALAZINE 50 milliGRAM(s) Oral daily  ALBUTerol/ipratropium for Nebulization 3 milliLiter(s) Nebulizer every 4 hours PRN  amLODIPine   Tablet 5 milliGRAM(s) Oral daily  HYDROmorphone   Tablet 2 milliGRAM(s) Oral three times a day PRN  lactobacillus acidophilus 1 Tablet(s) Oral two times a day with meals  loperamide 2 milliGRAM(s) Oral every 12 hours PRN  allopurinol 100 milliGRAM(s) Oral daily  acetaminophen   Tablet 500 milliGRAM(s) Oral every 4 hours PRN  buDESOnide   0.5 milliGRAM(s) Respule 0.5 milliGRAM(s) Inhalation every 12 hours  doxazosin 8 milliGRAM(s) Oral at bedtime  isosorbide   mononitrate ER Tablet (IMDUR) 120 milliGRAM(s) Oral daily  gabapentin 300 milliGRAM(s) Oral daily  simvastatin 10 milliGRAM(s) Oral at bedtime  fenofibrate Tablet 145 milliGRAM(s) Oral daily  sodium chloride 0.45%. 1000 milliLiter(s) IV Continuous <Continuous>  influenza  Vaccine (HIGH DOSE) 0.5 milliLiter(s) IntraMuscular once Yes

## 2025-03-22 NOTE — PHYSICAL THERAPY INITIAL EVALUATION ADULT - MD/RN NOTIFIED
Doing very well with Duloxetine , advised to conitnue for 1 year, then will gradually try to taper  it down   yes

## 2025-05-11 NOTE — PROGRESS NOTE ADULT - SUBJECTIVE AND OBJECTIVE BOX
Patient is a 82y old  Male who presents with a chief complaint of weakness  HPI:  80 y/o male with h/of COPD, CHF, chronic renal failure, paroxysmal A-fib.with, hematochezia s/p bleeding scan and flex sigmoidoscopy, anemia, chronic LE stasis dermatitis  was was admitted for increased weakness and wheepy right LE and foot. The patient had a RLE ABDULAZIZ boot for chronic venous stasis ulcers and lower extremity chronic edema that was removed recently.. The daughter reports increased oozing from LE ulcers; she reports thet the right foot ulcers were debrided the day PTA. No fever or chills reported. In Ed patient noted to have Hb 6. He received vancomycin 1 gm IV x 1 and cefepime 1 gm IV x 1.    s/p angioplasty  Alert and verbal  Lying in bed in NAD  Denies pain    MEDICATIONS  (STANDING):  buDESOnide   0.5 milliGRAM(s) Respule 0.5milliGRAM(s) Inhalation two times a day  doxazosin 8milliGRAM(s) Oral at bedtime  isosorbide   mononitrate ER Tablet (IMDUR) 120milliGRAM(s) Oral daily  diltiazem   CD 120milliGRAM(s) Oral daily  gabapentin 300milliGRAM(s) Oral daily  allopurinol 100milliGRAM(s) Oral daily  fenofibrate Tablet 145milliGRAM(s) Oral daily  simvastatin 10milliGRAM(s) Oral at bedtime  pramipexole 0.125milliGRAM(s) Oral three times a day  ALBUTerol    90 MICROgram(s) HFA Inhaler 2Puff(s) Inhalation every 6 hours  ammonium lactate 12% Lotion 1Application(s) Topical two times a day  fluticasone propionate 50 MICROgram(s)/spray Nasal Spray 1Spray(s) Both Nostrils two times a day  metoprolol 12.5milliGRAM(s) Oral two times a day  cefepime  IVPB 1000milliGRAM(s) IV Intermittent every 24 hours  hydrALAZINE 150milliGRAM(s) Oral two times a day  lactobacillus acidophilus 1Tablet(s) Oral daily  gabapentin 600milliGRAM(s) Oral at bedtime  pantoprazole  Injectable 40milliGRAM(s) IV Push every 12 hours    MEDICATIONS  (PRN):  traMADol 100milliGRAM(s) Oral every 6 hours PRN Moderate Pain (4 - 6)  aluminum hydroxide/magnesium hydroxide/simethicone Suspension 30milliLiter(s) Oral every 6 hours PRN Dyspepsia  oxyCODONE  5 mG/acetaminophen 325 mG 1Tablet(s) Oral every 4 hours PRN Severe Pain (7 - 10)      Vital Signs Last 24 Hrs  T(C): 36.2, Max: 37.4 (05-14 @ 16:22)  T(F): 97.2, Max: 99.4 (05-14 @ 16:22)  HR: 95 (78 - 105)  BP: 159/51 (139/35 - 168/53)  BP(mean): 80 (61 - 81)  RR: 17 (15 - 21)  SpO2: 97% (89% - 100%)    Physical Exam:        Constitutional: frail looking  HEENT: NC/AT, EOMI, PERRLA  Neck: supple  Back: no tenderness  Respiratory: few basal rales  Cardiovascular: S1S2 regular, no murmurs  Abdomen: soft, not tender, not distended, positive BS  Genitourinary: deferred  Rectal: deferred  Musculoskeletal: no muscle tenderness, no joint swelling or tenderness  Extremities: b/l chronic skin changes with edema; RLE: dorsal aspect of foot and lower ankle superficial ulcers with scant discharge; surrounding erythema and edema - improving; right foot feels warmer; mild right led edema  Neurological: confused, moving all extremities, no focal deficits  Skin: no rashes    Labs:                        10.2   6.4   )-----------( 302      ( 15 May 2017 05:19 )             33.5     05-15    148<H>  |  120<H>  |  80<H>  ----------------------------<  97  4.7   |  19<L>  |  1.49<H>    Ca    7.9<L>      15 May 2017 05:19               10.7   8.3   )-----------( 244      ( 12 May 2017 06:42 )             34.0     05-12    145  |  118<H>  |  95<H>  ----------------------------<  124<H>  5.4<H>   |  18<L>  |  2.09<H>    Ca    7.8<L>      12 May 2017 05:33                 10.8   8.2   )-----------( 231      ( 11 May 2017 12:55 )             33.7     05-11    146<H>  |  116<H>  |  112<H>  ----------------------------<  116<H>  5.1   |  18<L>  |  2.40<H>    Ca    7.6<L>      11 May 2017 12:55               8.3    10.1  )-----------( 282      ( 09 May 2017 04:53 )             25.0     05-09    141  |  111<H>  |  118<H>  ----------------------------<  90  5.2   |  19<L>  |  2.58<H>    Ca    7.9<L>      09 May 2017 04:53               7.4    13.7  )-----------( 354      ( 07 May 2017 06:47 )             23.9     05-07    148<H>  |  115<H>  |  136<H>  ----------------------------<  166<H>  4.8   |  22  |  3.42<H>    Ca    8.2<L>      07 May 2017 06:47                 8.3    14.5  )-----------( 370      ( 06 May 2017 04:43 )             26.7     05-06    153<H>  |  121<H>  |  142<H>  ----------------------------<  101<H>  5.0   |  20<L>  |  3.67<H>    Ca    8.2<L>      06 May 2017 04:43  Mg     2.8     05-05    TPro  6.6  /  Alb  2.4<L>  /  TBili  0.2  /  DBili  x   /  AST  20  /  ALT  15  /  AlkPhos  72  05-04               6.8    14.1  )-----------( 375      ( 05 May 2017 05:10 )             20.1     05-05    149<H>  |  118<H>  |  143<H>  ----------------------------<  109<H>  4.9   |  21<L>  |  3.68<H>    Ca    8.3<L>      05 May 2017 05:10  Mg     2.8     05-05    TPro  6.6  /  Alb  2.4<L>  /  TBili  0.2  /  DBili  x   /  AST  20  /  ALT  15  /  AlkPhos  72  05-04               6.3    10.8  )-----------( 398      ( 04 May 2017 12:54 )             21.8     05-04    145  |  112<H>  |  138<H>  ----------------------------<  123<H>  5.5<H>   |  19<L>  |  3.99<H>    Ca    8.8      04 May 2017 12:54    TPro  6.6  /  Alb  2.4<L>  /  TBili  0.2  /  DBili  x   /  AST  20  /  ALT  15  /  AlkPhos  72  05-04     LIVER FUNCTIONS - ( 04 May 2017 12:54 )  Alb: 2.4 g/dL / Pro: 6.6 gm/dL / ALK PHOS: 72 U/L / ALT: 15 U/L / AST: 20 U/L / GGT: x           Culture - Blood (05.04.17 @ 13:21)    Specimen Source: .Blood None    Culture Results:   No growth to date.        Radiology:    Advanced directives addressed: full resuscitation Yes

## 2025-05-13 NOTE — PROGRESS NOTE ADULT - PROBLEM SELECTOR PLAN 3
- s/p Tunnel Cath on 7/16/18, patient currently HD dependent  - Nephro consult appreciated: cont HD September
